# Patient Record
Sex: FEMALE | Race: BLACK OR AFRICAN AMERICAN | Employment: UNEMPLOYED | URBAN - METROPOLITAN AREA
[De-identification: names, ages, dates, MRNs, and addresses within clinical notes are randomized per-mention and may not be internally consistent; named-entity substitution may affect disease eponyms.]

---

## 2017-03-02 PROBLEM — O34.219 PREVIOUS CESAREAN DELIVERY AFFECTING PREGNANCY, ANTEPARTUM: Status: ACTIVE | Noted: 2017-03-02

## 2017-03-14 PROBLEM — E10.10 DKA, TYPE 1, NOT AT GOAL (HCC): Status: ACTIVE | Noted: 2017-03-14

## 2017-03-14 PROBLEM — O16.2 ELEVATED BLOOD PRESSURE AFFECTING PREGNANCY IN SECOND TRIMESTER, ANTEPARTUM: Status: ACTIVE | Noted: 2017-03-14

## 2017-03-14 PROBLEM — R03.0 ELEVATED BLOOD PRESSURE READING WITHOUT DIAGNOSIS OF HYPERTENSION: Status: ACTIVE | Noted: 2017-03-14

## 2017-04-17 PROBLEM — Z3A.19 19 WEEKS GESTATION OF PREGNANCY: Status: ACTIVE | Noted: 2017-04-17

## 2017-06-14 PROBLEM — Z3A.27 27 WEEKS GESTATION OF PREGNANCY: Status: ACTIVE | Noted: 2017-06-14

## 2017-06-14 PROBLEM — Z3A.19 19 WEEKS GESTATION OF PREGNANCY: Status: RESOLVED | Noted: 2017-04-17 | Resolved: 2017-06-14

## 2017-06-20 PROBLEM — Z3A.27 27 WEEKS GESTATION OF PREGNANCY: Status: RESOLVED | Noted: 2017-06-14 | Resolved: 2017-06-20

## 2017-06-20 PROBLEM — R03.0 ELEVATED BLOOD PRESSURE READING WITHOUT DIAGNOSIS OF HYPERTENSION: Status: RESOLVED | Noted: 2017-03-14 | Resolved: 2017-06-20

## 2017-06-20 PROBLEM — O16.2 ELEVATED BLOOD PRESSURE AFFECTING PREGNANCY IN SECOND TRIMESTER, ANTEPARTUM: Status: RESOLVED | Noted: 2017-03-14 | Resolved: 2017-06-20

## 2017-09-08 PROBLEM — N94.6 MENSES PAINFUL: Status: ACTIVE | Noted: 2017-09-08

## 2017-09-08 PROBLEM — N20.0 KIDNEY STONES: Status: ACTIVE | Noted: 2017-09-08

## 2017-09-27 PROBLEM — R10.84 GENERALIZED ABDOMINAL PAIN: Status: ACTIVE | Noted: 2017-09-27

## 2017-09-27 PROBLEM — F11.10 OPIOID ABUSE, EPISODIC (HCC): Status: ACTIVE | Noted: 2017-09-27

## 2017-09-27 PROBLEM — Z72.0 TOBACCO ABUSE: Status: ACTIVE | Noted: 2017-09-27

## 2018-03-10 ENCOUNTER — HOSPITAL ENCOUNTER (EMERGENCY)
Age: 28
Discharge: HOME | End: 2018-03-10
Attending: EMERGENCY MEDICINE
Payer: COMMERCIAL

## 2018-03-10 VITALS
HEIGHT: 64 IN | DIASTOLIC BLOOD PRESSURE: 73 MMHG | SYSTOLIC BLOOD PRESSURE: 103 MMHG | BODY MASS INDEX: 25.61 KG/M2 | HEART RATE: 87 BPM | WEIGHT: 150 LBS | OXYGEN SATURATION: 99 % | RESPIRATION RATE: 14 BRPM | TEMPERATURE: 97.6 F

## 2018-03-10 DIAGNOSIS — E16.2 HYPOGLYCEMIA: ICD-10-CM

## 2018-03-10 DIAGNOSIS — E10.649 TYPE 1 DIABETES MELLITUS WITH HYPOGLYCEMIA AND WITHOUT COMA (HCC): Primary | ICD-10-CM

## 2018-03-10 LAB
ALBUMIN SERPL-MCNC: 4.2 G/DL (ref 3.5–5.2)
ALP BLD-CCNC: 149 U/L (ref 35–104)
ALT SERPL-CCNC: 19 U/L (ref 0–32)
ANION GAP SERPL CALCULATED.3IONS-SCNC: 11 MMOL/L (ref 7–16)
AST SERPL-CCNC: 29 U/L (ref 0–31)
BACTERIA: ABNORMAL /HPF
BETA-HYDROXYBUTYRATE: 0.18 MMOL/L (ref 0.02–0.27)
BILIRUB SERPL-MCNC: <0.2 MG/DL (ref 0–1.2)
BILIRUBIN URINE: NEGATIVE
BLOOD, URINE: ABNORMAL
BUN BLDV-MCNC: 20 MG/DL (ref 6–20)
CALCIUM SERPL-MCNC: 10.2 MG/DL (ref 8.6–10.2)
CHLORIDE BLD-SCNC: 99 MMOL/L (ref 98–107)
CHP ED QC CHECK: NORMAL
CHP ED QC CHECK: YES
CLARITY: ABNORMAL
CO2: 29 MMOL/L (ref 22–29)
COLOR: YELLOW
CREAT SERPL-MCNC: 1.4 MG/DL (ref 0.5–1)
GFR AFRICAN AMERICAN: 55
GFR NON-AFRICAN AMERICAN: 55 ML/MIN/1.73
GLUCOSE BLD-MCNC: 201 MG/DL
GLUCOSE BLD-MCNC: 519 MG/DL
GLUCOSE BLD-MCNC: 537 MG/DL
GLUCOSE BLD-MCNC: 54 MG/DL
GLUCOSE BLD-MCNC: 80 MG/DL (ref 74–109)
GLUCOSE BLD-MCNC: 83 MG/DL
GLUCOSE URINE: >=1000 MG/DL
HCT VFR BLD CALC: 43.6 % (ref 34–48)
HEMOGLOBIN: 14.2 G/DL (ref 11.5–15.5)
KETONES, URINE: NEGATIVE MG/DL
LACTIC ACID: 2.2 MMOL/L (ref 0.5–2.2)
LEUKOCYTE ESTERASE, URINE: ABNORMAL
MCH RBC QN AUTO: 30.3 PG (ref 26–35)
MCHC RBC AUTO-ENTMCNC: 32.6 % (ref 32–34.5)
MCV RBC AUTO: 93 FL (ref 80–99.9)
METER GLUCOSE: 125 MG/DL (ref 70–110)
METER GLUCOSE: 201 MG/DL (ref 70–110)
METER GLUCOSE: 54 MG/DL (ref 70–110)
METER GLUCOSE: >500 MG/DL (ref 70–110)
NITRITE, URINE: NEGATIVE
PDW BLD-RTO: 12 FL (ref 11.5–15)
PH UA: 5 (ref 5–9)
PH VENOUS: 7.28 (ref 7.3–7.42)
PLATELET # BLD: 316 E9/L (ref 130–450)
PMV BLD AUTO: 10.2 FL (ref 7–12)
POC ANION GAP: 13
POC ANION GAP: 14
POC BUN: 20
POC BUN: 28
POC CHLORIDE: 103
POC CHLORIDE: 106
POC CO2: 22
POC CO2: 30
POC CREATININE: 0.8
POC CREATININE: 1.4
POC POTASSIUM: 4
POC POTASSIUM: 4
POC SODIUM: 137
POC SODIUM: 141
POTASSIUM SERPL-SCNC: 4.9 MMOL/L (ref 3.5–5)
PREGNANCY TEST URINE, POC: NEGATIVE
PROTEIN UA: 30 MG/DL
RBC # BLD: 4.69 E12/L (ref 3.5–5.5)
RBC UA: ABNORMAL /HPF (ref 0–2)
SODIUM BLD-SCNC: 139 MMOL/L (ref 132–146)
SPECIFIC GRAVITY UA: 1.01 (ref 1–1.03)
TOTAL PROTEIN: 8.6 G/DL (ref 6.4–8.3)
UROBILINOGEN, URINE: 0.2 E.U./DL
WBC # BLD: 8.5 E9/L (ref 4.5–11.5)
WBC UA: ABNORMAL /HPF (ref 0–5)
YEAST: ABNORMAL

## 2018-03-10 PROCEDURE — 96375 TX/PRO/DX INJ NEW DRUG ADDON: CPT

## 2018-03-10 PROCEDURE — 82800 BLOOD PH: CPT

## 2018-03-10 PROCEDURE — 81015 MICROSCOPIC EXAM OF URINE: CPT

## 2018-03-10 PROCEDURE — 80053 COMPREHEN METABOLIC PANEL: CPT

## 2018-03-10 PROCEDURE — 81003 URINALYSIS AUTO W/O SCOPE: CPT

## 2018-03-10 PROCEDURE — 36000 PLACE NEEDLE IN VEIN: CPT

## 2018-03-10 PROCEDURE — 36415 COLL VENOUS BLD VENIPUNCTURE: CPT

## 2018-03-10 PROCEDURE — 6370000000 HC RX 637 (ALT 250 FOR IP): Performed by: EMERGENCY MEDICINE

## 2018-03-10 PROCEDURE — 82962 GLUCOSE BLOOD TEST: CPT

## 2018-03-10 PROCEDURE — 82010 KETONE BODYS QUAN: CPT

## 2018-03-10 PROCEDURE — 83605 ASSAY OF LACTIC ACID: CPT

## 2018-03-10 PROCEDURE — 85027 COMPLETE CBC AUTOMATED: CPT

## 2018-03-10 PROCEDURE — 96374 THER/PROPH/DIAG INJ IV PUSH: CPT

## 2018-03-10 PROCEDURE — 6360000002 HC RX W HCPCS: Performed by: EMERGENCY MEDICINE

## 2018-03-10 PROCEDURE — 2580000003 HC RX 258: Performed by: EMERGENCY MEDICINE

## 2018-03-10 PROCEDURE — 6370000000 HC RX 637 (ALT 250 FOR IP)

## 2018-03-10 PROCEDURE — 99285 EMERGENCY DEPT VISIT HI MDM: CPT

## 2018-03-10 RX ORDER — 0.9 % SODIUM CHLORIDE 0.9 %
1000 INTRAVENOUS SOLUTION INTRAVENOUS ONCE
Status: COMPLETED | OUTPATIENT
Start: 2018-03-10 | End: 2018-03-10

## 2018-03-10 RX ORDER — SODIUM CHLORIDE, SODIUM LACTATE, POTASSIUM CHLORIDE, CALCIUM CHLORIDE 600; 310; 30; 20 MG/100ML; MG/100ML; MG/100ML; MG/100ML
INJECTION, SOLUTION INTRAVENOUS ONCE
Status: DISCONTINUED | OUTPATIENT
Start: 2018-03-10 | End: 2018-03-10 | Stop reason: HOSPADM

## 2018-03-10 RX ORDER — ONDANSETRON 2 MG/ML
4 INJECTION INTRAMUSCULAR; INTRAVENOUS ONCE
Status: DISCONTINUED | OUTPATIENT
Start: 2018-03-10 | End: 2018-03-10 | Stop reason: HOSPADM

## 2018-03-10 RX ORDER — SODIUM CHLORIDE, SODIUM LACTATE, POTASSIUM CHLORIDE, CALCIUM CHLORIDE 600; 310; 30; 20 MG/100ML; MG/100ML; MG/100ML; MG/100ML
INJECTION, SOLUTION INTRAVENOUS ONCE
Status: DISCONTINUED | OUTPATIENT
Start: 2018-03-10 | End: 2018-03-10

## 2018-03-10 RX ORDER — ONDANSETRON 2 MG/ML
INJECTION INTRAMUSCULAR; INTRAVENOUS
Status: DISCONTINUED
Start: 2018-03-10 | End: 2018-03-10 | Stop reason: WASHOUT

## 2018-03-10 RX ORDER — ONDANSETRON 4 MG/1
4 TABLET, ORALLY DISINTEGRATING ORAL ONCE
Status: COMPLETED | OUTPATIENT
Start: 2018-03-10 | End: 2018-03-10

## 2018-03-10 RX ORDER — CEPHALEXIN 250 MG/1
500 CAPSULE ORAL ONCE
Status: COMPLETED | OUTPATIENT
Start: 2018-03-10 | End: 2018-03-10

## 2018-03-10 RX ORDER — ONDANSETRON 4 MG/1
TABLET, ORALLY DISINTEGRATING ORAL
Status: DISCONTINUED
Start: 2018-03-10 | End: 2018-03-10 | Stop reason: HOSPADM

## 2018-03-10 RX ADMIN — INSULIN HUMAN 10 UNITS: 100 INJECTION, SOLUTION PARENTERAL at 17:38

## 2018-03-10 RX ADMIN — INSULIN HUMAN 10 UNITS: 100 INJECTION, SOLUTION PARENTERAL at 16:19

## 2018-03-10 RX ADMIN — SODIUM CHLORIDE 1000 ML: 9 INJECTION, SOLUTION INTRAVENOUS at 17:27

## 2018-03-10 RX ADMIN — ONDANSETRON 4 MG: 4 TABLET, ORALLY DISINTEGRATING ORAL at 12:52

## 2018-03-10 RX ADMIN — CEPHALEXIN 500 MG: 250 CAPSULE ORAL at 17:33

## 2018-03-10 ASSESSMENT — ENCOUNTER SYMPTOMS
CONSTIPATION: 0
EYE PAIN: 0
CHEST TIGHTNESS: 0
EYE REDNESS: 0
ABDOMINAL PAIN: 0
VOMITING: 0
DIARRHEA: 0
SHORTNESS OF BREATH: 0
NAUSEA: 0

## 2018-03-10 ASSESSMENT — PAIN DESCRIPTION - LOCATION: LOCATION: HAND

## 2018-03-10 ASSESSMENT — PAIN DESCRIPTION - PAIN TYPE: TYPE: ACUTE PAIN

## 2018-03-10 ASSESSMENT — PAIN DESCRIPTION - DESCRIPTORS: DESCRIPTORS: ACHING

## 2018-03-10 ASSESSMENT — PAIN DESCRIPTION - ONSET: ONSET: ON-GOING

## 2018-03-10 ASSESSMENT — PAIN SCALES - GENERAL: PAINLEVEL_OUTOF10: 7

## 2018-03-10 ASSESSMENT — PAIN DESCRIPTION - FREQUENCY: FREQUENCY: CONTINUOUS

## 2018-03-10 NOTE — ED PROVIDER NOTES
preformed under live ultrasound for guidance. The patient tolerated the procedure well. Complications: None      ED Course        --------------------------------------------- PAST HISTORY ---------------------------------------------  Past Medical History:  has a past medical history of Chronic kidney disease; Depression; Diabetes mellitus ([facility] [address] Diabetic ketoacidosis (HCC); MRSA (methicillin resistant Staphylococcus aureus); Non compliance w medication regimen; Other disorders of kidney and ureter; Pregnancy; and Severe pre-eclampsia in third trimester. Past Surgical History:  has a past surgical history that includes back surgery; ECHO Compl W Dop Color Flow (2012); ECHO Compl W Dop Color Flow (6/10/2013);  section; and Cholecystectomy. Social History:  reports that she has been smoking Cigarettes. She has a 0.75 pack-year smoking history. She has never used smokeless tobacco. She reports that she does not drink alcohol or use drugs. Family History: family history includes Asthma in her brother and mother; Hypertension in her mother. The patients home medications have been reviewed. Allergies: Patient has no known allergies.     -------------------------------------------------- RESULTS -------------------------------------------------  Labs:  Results for orders placed or performed during the hospital encounter of 03/10/18   CBC   Result Value Ref Range    WBC 8.5 4.5 - 11.5 E9/L    RBC 4.69 3.50 - 5.50 E12/L    Hemoglobin 14.2 11.5 - 15.5 g/dL    Hematocrit 43.6 34.0 - 48.0 %    MCV 93.0 80.0 - 99.9 fL    MCH 30.3 26.0 - 35.0 pg    MCHC 32.6 32.0 - 34.5 %    RDW 12.0 11.5 - 15.0 fL    Platelets 216 484 - 464 E9/L    MPV 10.2 7.0 - 12.0 fL   Comprehensive Metabolic Panel   Result Value Ref Range    Sodium 139 132 - 146 mmol/L    Potassium 4.9 3.5 - 5.0 mmol/L    Chloride 99 98 - 107 mmol/L    CO2 29 22 - 29 mmol/L    Anion Gap 11 7 - 16 mmol/L    Glucose 80 74 - 109 mg/dL coma ([facility]    2. Hypoglycemia        Disposition:  Patient's disposition: Discharge to home  Patient's condition is stable.            Maryann Garza DO  03/10/18 190

## 2018-03-10 NOTE — ED NOTES
Pt medicated per order. Pt aware of need for urine specimen. Pt unable to obtain at this time. NAD noted. Will continue to monitor.       Alina Estrada RN  03/10/18 3732

## 2018-03-11 NOTE — ED NOTES
Patient provided with hospital pants and meal tray. Patient called for ride.      Sandhya Myrick RN  03/10/18 0891

## 2018-03-18 ENCOUNTER — HOSPITAL ENCOUNTER (INPATIENT)
Age: 28
LOS: 2 days | Discharge: HOME | DRG: 420 | End: 2018-03-20
Attending: EMERGENCY MEDICINE | Admitting: INTERNAL MEDICINE
Payer: COMMERCIAL

## 2018-03-18 ENCOUNTER — APPOINTMENT (OUTPATIENT)
Dept: GENERAL RADIOLOGY | Age: 28
DRG: 420 | End: 2018-03-18
Payer: COMMERCIAL

## 2018-03-18 DIAGNOSIS — E10.10 DIABETIC KETOACIDOSIS WITHOUT COMA ASSOCIATED WITH TYPE 1 DIABETES MELLITUS (HCC): Primary | ICD-10-CM

## 2018-03-18 PROBLEM — E86.0 DEHYDRATION: Status: ACTIVE | Noted: 2018-03-18

## 2018-03-18 PROBLEM — E10.65 HYPERGLYCEMIA DUE TO TYPE 1 DIABETES MELLITUS (HCC): Status: ACTIVE | Noted: 2018-03-18

## 2018-03-18 LAB
ALBUMIN SERPL-MCNC: 3.5 G/DL (ref 3.5–5.2)
ALP BLD-CCNC: 138 U/L (ref 35–104)
ALT SERPL-CCNC: 11 U/L (ref 0–32)
ANION GAP SERPL CALCULATED.3IONS-SCNC: 15 MMOL/L (ref 7–16)
AST SERPL-CCNC: 11 U/L (ref 0–31)
BACTERIA: ABNORMAL /HPF
BASOPHILS ABSOLUTE: 0.09 E9/L (ref 0–0.2)
BASOPHILS RELATIVE PERCENT: 1.3 % (ref 0–2)
BETA-HYDROXYBUTYRATE: 3.24 MMOL/L (ref 0.02–0.27)
BILIRUB SERPL-MCNC: 0.3 MG/DL (ref 0–1.2)
BILIRUBIN URINE: NEGATIVE
BLOOD, URINE: ABNORMAL
BUN BLDV-MCNC: 21 MG/DL (ref 6–20)
CALCIUM SERPL-MCNC: 9 MG/DL (ref 8.6–10.2)
CHLORIDE BLD-SCNC: 99 MMOL/L (ref 98–107)
CHP ED QC CHECK: NORMAL
CHP ED QC CHECK: YES
CLARITY: CLEAR
CO2: 25 MMOL/L (ref 22–29)
COLOR: ABNORMAL
CREAT SERPL-MCNC: 1 MG/DL (ref 0.5–1)
EOSINOPHILS ABSOLUTE: 0.12 E9/L (ref 0.05–0.5)
EOSINOPHILS RELATIVE PERCENT: 1.7 % (ref 0–6)
GFR AFRICAN AMERICAN: >60
GFR NON-AFRICAN AMERICAN: >60 ML/MIN/1.73
GLUCOSE BLD-MCNC: 281 MG/DL
GLUCOSE BLD-MCNC: 488 MG/DL
GLUCOSE BLD-MCNC: 715 MG/DL (ref 74–109)
GLUCOSE BLD-MCNC: NORMAL MG/DL
GLUCOSE URINE: >=1000 MG/DL
HCT VFR BLD CALC: 35.9 % (ref 34–48)
HEMOGLOBIN: 11.8 G/DL (ref 11.5–15.5)
IMMATURE GRANULOCYTES #: 0.02 E9/L
IMMATURE GRANULOCYTES %: 0.3 % (ref 0–5)
KETONES, URINE: 40 MG/DL
LACTIC ACID: 1 MMOL/L (ref 0.5–2.2)
LEUKOCYTE ESTERASE, URINE: NEGATIVE
LYMPHOCYTES ABSOLUTE: 1.86 E9/L (ref 1.5–4)
LYMPHOCYTES RELATIVE PERCENT: 26.8 % (ref 20–42)
MAGNESIUM: 2.1 MG/DL (ref 1.6–2.6)
MCH RBC QN AUTO: 30.7 PG (ref 26–35)
MCHC RBC AUTO-ENTMCNC: 32.9 % (ref 32–34.5)
MCV RBC AUTO: 93.5 FL (ref 80–99.9)
METER GLUCOSE: 281 MG/DL (ref 70–110)
METER GLUCOSE: >500 MG/DL (ref 70–110)
METER GLUCOSE: >500 MG/DL (ref 70–110)
MONOCYTES ABSOLUTE: 0.25 E9/L (ref 0.1–0.95)
MONOCYTES RELATIVE PERCENT: 3.6 % (ref 2–12)
NEUTROPHILS ABSOLUTE: 4.61 E9/L (ref 1.8–7.3)
NEUTROPHILS RELATIVE PERCENT: 66.3 % (ref 43–80)
NITRITE, URINE: NEGATIVE
PDW BLD-RTO: 12.3 FL (ref 11.5–15)
PH UA: 5.5 (ref 5–9)
PH VENOUS: 7.39 (ref 7.3–7.42)
PHOSPHORUS: 4.3 MG/DL (ref 2.5–4.5)
PLATELET # BLD: 274 E9/L (ref 130–450)
PMV BLD AUTO: 10.6 FL (ref 7–12)
POC ANION GAP: 15
POC ANION GAP: 15
POC BUN: 21
POC BUN: 27
POC CHLORIDE: 101
POC CHLORIDE: 104
POC CO2: 25
POC CO2: 25
POC CREATININE: 1
POC CREATININE: NORMAL
POC POTASSIUM: NORMAL
POC POTASSIUM: NORMAL
POC SODIUM: 135
POC SODIUM: 140
POTASSIUM SERPL-SCNC: 5.1 MMOL/L (ref 3.5–5)
PREGNANCY TEST URINE, POC: NEGATIVE
PROTEIN UA: NEGATIVE MG/DL
RBC # BLD: 3.84 E12/L (ref 3.5–5.5)
RBC UA: ABNORMAL /HPF (ref 0–2)
SODIUM BLD-SCNC: 139 MMOL/L (ref 132–146)
SPECIFIC GRAVITY UA: <=1.005 (ref 1–1.03)
TOTAL PROTEIN: 6.9 G/DL (ref 6.4–8.3)
UROBILINOGEN, URINE: 0.2 E.U./DL
WBC # BLD: 7 E9/L (ref 4.5–11.5)
WBC UA: ABNORMAL /HPF (ref 0–5)

## 2018-03-18 PROCEDURE — 94761 N-INVAS EAR/PLS OXIMETRY MLT: CPT

## 2018-03-18 PROCEDURE — 99285 EMERGENCY DEPT VISIT HI MDM: CPT

## 2018-03-18 PROCEDURE — 2000000000 HC ICU R&B

## 2018-03-18 PROCEDURE — 82962 GLUCOSE BLOOD TEST: CPT

## 2018-03-18 PROCEDURE — 83735 ASSAY OF MAGNESIUM: CPT

## 2018-03-18 PROCEDURE — 83605 ASSAY OF LACTIC ACID: CPT

## 2018-03-18 PROCEDURE — 6360000002 HC RX W HCPCS: Performed by: EMERGENCY MEDICINE

## 2018-03-18 PROCEDURE — 82800 BLOOD PH: CPT

## 2018-03-18 PROCEDURE — 71045 X-RAY EXAM CHEST 1 VIEW: CPT

## 2018-03-18 PROCEDURE — 93005 ELECTROCARDIOGRAM TRACING: CPT | Performed by: NURSE PRACTITIONER

## 2018-03-18 PROCEDURE — 83930 ASSAY OF BLOOD OSMOLALITY: CPT

## 2018-03-18 PROCEDURE — 80053 COMPREHEN METABOLIC PANEL: CPT

## 2018-03-18 PROCEDURE — 85025 COMPLETE CBC W/AUTO DIFF WBC: CPT

## 2018-03-18 PROCEDURE — 96365 THER/PROPH/DIAG IV INF INIT: CPT

## 2018-03-18 PROCEDURE — 96366 THER/PROPH/DIAG IV INF ADDON: CPT

## 2018-03-18 PROCEDURE — 84100 ASSAY OF PHOSPHORUS: CPT

## 2018-03-18 PROCEDURE — 2580000003 HC RX 258: Performed by: EMERGENCY MEDICINE

## 2018-03-18 PROCEDURE — 82010 KETONE BODYS QUAN: CPT

## 2018-03-18 PROCEDURE — 81001 URINALYSIS AUTO W/SCOPE: CPT

## 2018-03-18 PROCEDURE — 6370000000 HC RX 637 (ALT 250 FOR IP): Performed by: EMERGENCY MEDICINE

## 2018-03-18 PROCEDURE — 96375 TX/PRO/DX INJ NEW DRUG ADDON: CPT

## 2018-03-18 PROCEDURE — 36415 COLL VENOUS BLD VENIPUNCTURE: CPT

## 2018-03-18 RX ORDER — NICOTINE POLACRILEX 4 MG
15 LOZENGE BUCCAL PRN
Status: DISCONTINUED | OUTPATIENT
Start: 2018-03-18 | End: 2018-03-20 | Stop reason: HOSPADM

## 2018-03-18 RX ORDER — DEXTROSE MONOHYDRATE 25 G/50ML
12.5 INJECTION, SOLUTION INTRAVENOUS PRN
Status: DISCONTINUED | OUTPATIENT
Start: 2018-03-18 | End: 2018-03-20 | Stop reason: HOSPADM

## 2018-03-18 RX ORDER — ONDANSETRON 2 MG/ML
4 INJECTION INTRAMUSCULAR; INTRAVENOUS ONCE
Status: COMPLETED | OUTPATIENT
Start: 2018-03-18 | End: 2018-03-18

## 2018-03-18 RX ORDER — SODIUM CHLORIDE, SODIUM LACTATE, POTASSIUM CHLORIDE, AND CALCIUM CHLORIDE .6; .31; .03; .02 G/100ML; G/100ML; G/100ML; G/100ML
1000 INJECTION, SOLUTION INTRAVENOUS ONCE
Status: COMPLETED | OUTPATIENT
Start: 2018-03-18 | End: 2018-03-18

## 2018-03-18 RX ORDER — DEXTROSE MONOHYDRATE 50 MG/ML
100 INJECTION, SOLUTION INTRAVENOUS PRN
Status: DISCONTINUED | OUTPATIENT
Start: 2018-03-18 | End: 2018-03-20 | Stop reason: HOSPADM

## 2018-03-18 RX ADMIN — ONDANSETRON 4 MG: 2 INJECTION INTRAMUSCULAR; INTRAVENOUS at 18:37

## 2018-03-18 RX ADMIN — SODIUM CHLORIDE 0.03 UNITS/KG/HR: 9 INJECTION, SOLUTION INTRAVENOUS at 22:57

## 2018-03-18 RX ADMIN — SODIUM CHLORIDE 0.1 UNITS/KG/HR: 9 INJECTION, SOLUTION INTRAVENOUS at 20:01

## 2018-03-18 RX ADMIN — SODIUM CHLORIDE, POTASSIUM CHLORIDE, SODIUM LACTATE AND CALCIUM CHLORIDE 1000 ML: 600; 310; 30; 20 INJECTION, SOLUTION INTRAVENOUS at 18:35

## 2018-03-18 RX ADMIN — SODIUM CHLORIDE, POTASSIUM CHLORIDE, SODIUM LACTATE AND CALCIUM CHLORIDE 1000 ML: 600; 310; 30; 20 INJECTION, SOLUTION INTRAVENOUS at 18:33

## 2018-03-18 ASSESSMENT — PAIN DESCRIPTION - DESCRIPTORS: DESCRIPTORS: ACHING

## 2018-03-18 ASSESSMENT — ENCOUNTER SYMPTOMS
VOMITING: 0
BLURRED VISION: 0
DIARRHEA: 0
SORE THROAT: 0
SHORTNESS OF BREATH: 0
BLOOD IN STOOL: 0
COUGH: 0
SINUS PRESSURE: 0
CONSTIPATION: 0
ABDOMINAL PAIN: 1
COLOR CHANGE: 0
SINUS PAIN: 0

## 2018-03-18 ASSESSMENT — PAIN DESCRIPTION - LOCATION: LOCATION: ABDOMEN

## 2018-03-18 ASSESSMENT — PAIN SCALES - GENERAL: PAINLEVEL_OUTOF10: 8

## 2018-03-18 ASSESSMENT — PAIN DESCRIPTION - PAIN TYPE: TYPE: ACUTE PAIN

## 2018-03-18 ASSESSMENT — PAIN DESCRIPTION - FREQUENCY: FREQUENCY: CONTINUOUS

## 2018-03-18 NOTE — ED PROVIDER NOTES
Patient is a 23 yo female with hx of poorly controlled diabetes that presents to the ED via private vehicle for hyperglycemia. She reports that she has been having worsening shortness of breath with standing, crampy abdominal pain and nausea since last night. She took blood glucose at home this morning and read high, prompting visit to the ED. She states that she has been on a sliding insulin scale at home with her last dose of 10 units prior to arrival. She admits to similar prior episodes with being hospitalized for DKA multiple times. The history is provided by the patient. Hyperglycemia   Blood sugar level PTA:  >500  Onset quality:  Gradual  Duration:  1 day  Progression:  Worsening  Chronicity:  Recurrent  Diabetes status:  Controlled with insulin  Context: not change in medication and not new diabetes diagnosis    Relieved by:  None tried  Ineffective treatments:  None tried  Associated symptoms: abdominal pain, fatigue and nausea    Associated symptoms: no altered mental status, no blurred vision, no chest pain, no dizziness, no dysuria, no fever, no shortness of breath, no vomiting and no weakness    Risk factors: hx of DKA    Risk factors: no pregnancy        Review of Systems   Constitutional: Positive for fatigue. Negative for chills and fever. HENT: Negative for congestion, sinus pain, sinus pressure and sore throat. Eyes: Negative for blurred vision and visual disturbance. Respiratory: Negative for cough and shortness of breath. Cardiovascular: Positive for palpitations. Negative for chest pain. Gastrointestinal: Positive for abdominal pain and nausea. Negative for blood in stool, constipation, diarrhea and vomiting. Genitourinary: Negative for decreased urine volume, difficulty urinating, dysuria and flank pain. Musculoskeletal: Negative for neck pain and neck stiffness. Skin: Negative for color change.    Neurological: Negative for dizziness, syncope, weakness, Sodium 135     POC CO2 25     QC OK? YES    POCT Glucose   Result Value Ref Range    Glucose 281 mg/dL   POCT Glucose   Result Value Ref Range    Meter Glucose >500 (H) 70 - 110 mg/dL   POCT chem basic w/ ICA   Result Value Ref Range    POC BUN 21     POC Chloride 104     POC Creatinine      POC Anion Gap 15     POC Glucose 488     POC Potassium      POC Sodium 140     POC CO2 25    POCT Glucose   Result Value Ref Range    Meter Glucose 281 (H) 70 - 110 mg/dL   POCT Glucose   Result Value Ref Range    Meter Glucose 269 (H) 70 - 110 mg/dL   EKG 12 Lead   Result Value Ref Range    Ventricular Rate 85 BPM    Atrial Rate 85 BPM    P-R Interval 108 ms    QRS Duration 80 ms    Q-T Interval 350 ms    QTc Calculation (Bazett) 416 ms    P Axis 42 degrees    R Axis 55 degrees    T Axis 41 degrees       Radiology  XR CHEST PORTABLE   Final Result      Normal portable frontal chest.          ------------------------- NURSING NOTES AND VITALS REVIEWED ---------------------------  Date / Time Roomed:  3/18/2018  4:49 PM  ED Bed Assignment:  [id number]    The nursing notes within the ED encounter and vital signs as below have been reviewed. Patient Vitals for the past 24 hrs:   BP Temp Temp src Pulse Resp SpO2 Height Weight   03/18/18 2300 114/78 - - 97 10 99 % - -   03/18/18 2041 136/88 - - 80 14 98 % - -   03/18/18 2000 136/83 - - 80 10 97 % - -   03/18/18 1851 134/88 98.3 °F (36.8 °C) Oral 84 18 98 % - -   03/18/18 1644 110/78 98.3 °F (36.8 °C) - 138 20 99 % 5' 4\" (1.626 m) 141 lb (64 kg)       Oxygen Saturation Interpretation: Normal    ------------------------------------------ PROGRESS NOTES ------------------------------------------  Re-evaluation(s):  903: Patient is resting in bed comfortably, in NAD. Receiving DKA protocol now. 915: Patient is alert and oriented. Patient denies any abdominal pain at this time on reassessment.      Consultations:  Spoke with Annabella Whitt,  They will admit this

## 2018-03-19 LAB
ANION GAP SERPL CALCULATED.3IONS-SCNC: 10 MMOL/L (ref 7–16)
ANION GAP SERPL CALCULATED.3IONS-SCNC: 11 MMOL/L (ref 7–16)
ANION GAP SERPL CALCULATED.3IONS-SCNC: 8 MMOL/L (ref 7–16)
BASOPHILS ABSOLUTE: 0.12 E9/L (ref 0–0.2)
BASOPHILS RELATIVE PERCENT: 1.4 % (ref 0–2)
BUN BLDV-MCNC: 14 MG/DL (ref 6–20)
BUN BLDV-MCNC: 15 MG/DL (ref 6–20)
BUN BLDV-MCNC: 16 MG/DL (ref 6–20)
CALCIUM SERPL-MCNC: 6 MG/DL (ref 8.6–10.2)
CALCIUM SERPL-MCNC: 8.4 MG/DL (ref 8.6–10.2)
CALCIUM SERPL-MCNC: 8.8 MG/DL (ref 8.6–10.2)
CHLORIDE BLD-SCNC: 103 MMOL/L (ref 98–107)
CHLORIDE BLD-SCNC: 111 MMOL/L (ref 98–107)
CHLORIDE BLD-SCNC: 96 MMOL/L (ref 98–107)
CO2: 20 MMOL/L (ref 22–29)
CO2: 28 MMOL/L (ref 22–29)
CO2: 30 MMOL/L (ref 22–29)
CREAT SERPL-MCNC: 0.5 MG/DL (ref 0.5–1)
CREAT SERPL-MCNC: 0.8 MG/DL (ref 0.5–1)
CREAT SERPL-MCNC: 0.8 MG/DL (ref 0.5–1)
EOSINOPHILS ABSOLUTE: 0.2 E9/L (ref 0.05–0.5)
EOSINOPHILS RELATIVE PERCENT: 2.4 % (ref 0–6)
GFR AFRICAN AMERICAN: >60
GFR NON-AFRICAN AMERICAN: >60 ML/MIN/1.73
GLUCOSE BLD-MCNC: 145 MG/DL (ref 74–109)
GLUCOSE BLD-MCNC: 252 MG/DL (ref 74–109)
GLUCOSE BLD-MCNC: 378 MG/DL (ref 74–109)
HBA1C MFR BLD: 13 % (ref 4.8–5.9)
HCT VFR BLD CALC: 32.1 % (ref 34–48)
HEMOGLOBIN: 10.7 G/DL (ref 11.5–15.5)
IMMATURE GRANULOCYTES #: 0.01 E9/L
IMMATURE GRANULOCYTES %: 0.1 % (ref 0–5)
LYMPHOCYTES ABSOLUTE: 3.74 E9/L (ref 1.5–4)
LYMPHOCYTES RELATIVE PERCENT: 45 % (ref 20–42)
MAGNESIUM: 1.6 MG/DL (ref 1.6–2.6)
MAGNESIUM: 2.1 MG/DL (ref 1.6–2.6)
MCH RBC QN AUTO: 30.8 PG (ref 26–35)
MCHC RBC AUTO-ENTMCNC: 33.3 % (ref 32–34.5)
MCV RBC AUTO: 92.5 FL (ref 80–99.9)
METER GLUCOSE: 104 MG/DL (ref 70–110)
METER GLUCOSE: 109 MG/DL (ref 70–110)
METER GLUCOSE: 116 MG/DL (ref 70–110)
METER GLUCOSE: 118 MG/DL (ref 70–110)
METER GLUCOSE: 126 MG/DL (ref 70–110)
METER GLUCOSE: 131 MG/DL (ref 70–110)
METER GLUCOSE: 143 MG/DL (ref 70–110)
METER GLUCOSE: 186 MG/DL (ref 70–110)
METER GLUCOSE: 197 MG/DL (ref 70–110)
METER GLUCOSE: 269 MG/DL (ref 70–110)
METER GLUCOSE: 283 MG/DL (ref 70–110)
METER GLUCOSE: 302 MG/DL (ref 70–110)
METER GLUCOSE: 99 MG/DL (ref 70–110)
MONOCYTES ABSOLUTE: 0.45 E9/L (ref 0.1–0.95)
MONOCYTES RELATIVE PERCENT: 5.4 % (ref 2–12)
NEUTROPHILS ABSOLUTE: 3.8 E9/L (ref 1.8–7.3)
NEUTROPHILS RELATIVE PERCENT: 45.7 % (ref 43–80)
OSMOLALITY: 328 MOSM/KG (ref 285–310)
PDW BLD-RTO: 12.3 FL (ref 11.5–15)
PHOSPHORUS: 2.9 MG/DL (ref 2.5–4.5)
PHOSPHORUS: 3.6 MG/DL (ref 2.5–4.5)
PLATELET # BLD: 244 E9/L (ref 130–450)
PMV BLD AUTO: 11 FL (ref 7–12)
POTASSIUM REFLEX MAGNESIUM: 4.1 MMOL/L (ref 3.5–5)
POTASSIUM SERPL-SCNC: 3.9 MMOL/L (ref 3.5–5)
POTASSIUM SERPL-SCNC: 4 MMOL/L (ref 3.5–5)
RBC # BLD: 3.47 E12/L (ref 3.5–5.5)
SODIUM BLD-SCNC: 135 MMOL/L (ref 132–146)
SODIUM BLD-SCNC: 141 MMOL/L (ref 132–146)
SODIUM BLD-SCNC: 141 MMOL/L (ref 132–146)
WBC # BLD: 8.3 E9/L (ref 4.5–11.5)

## 2018-03-19 PROCEDURE — 2000000000 HC ICU R&B

## 2018-03-19 PROCEDURE — 99406 BEHAV CHNG SMOKING 3-10 MIN: CPT

## 2018-03-19 PROCEDURE — 2580000003 HC RX 258: Performed by: NURSE PRACTITIONER

## 2018-03-19 PROCEDURE — 83735 ASSAY OF MAGNESIUM: CPT

## 2018-03-19 PROCEDURE — 36415 COLL VENOUS BLD VENIPUNCTURE: CPT

## 2018-03-19 PROCEDURE — 80048 BASIC METABOLIC PNL TOTAL CA: CPT

## 2018-03-19 PROCEDURE — 6370000000 HC RX 637 (ALT 250 FOR IP): Performed by: INTERNAL MEDICINE

## 2018-03-19 PROCEDURE — 87081 CULTURE SCREEN ONLY: CPT

## 2018-03-19 PROCEDURE — 84100 ASSAY OF PHOSPHORUS: CPT

## 2018-03-19 PROCEDURE — 82962 GLUCOSE BLOOD TEST: CPT

## 2018-03-19 PROCEDURE — 83036 HEMOGLOBIN GLYCOSYLATED A1C: CPT

## 2018-03-19 PROCEDURE — 85025 COMPLETE CBC W/AUTO DIFF WBC: CPT

## 2018-03-19 PROCEDURE — 6360000002 HC RX W HCPCS: Performed by: INTERNAL MEDICINE

## 2018-03-19 PROCEDURE — 6360000002 HC RX W HCPCS: Performed by: PHYSICIAN ASSISTANT

## 2018-03-19 PROCEDURE — 36415 COLL VENOUS BLD VENIPUNCTURE: CPT | Performed by: NURSE PRACTITIONER

## 2018-03-19 PROCEDURE — 2500000003 HC RX 250 WO HCPCS: Performed by: NURSE PRACTITIONER

## 2018-03-19 RX ORDER — DICYCLOMINE HYDROCHLORIDE 10 MG/ML
20 INJECTION INTRAMUSCULAR ONCE
Status: COMPLETED | OUTPATIENT
Start: 2018-03-19 | End: 2018-03-19

## 2018-03-19 RX ORDER — ONDANSETRON 2 MG/ML
4 INJECTION INTRAMUSCULAR; INTRAVENOUS EVERY 6 HOURS PRN
Status: DISCONTINUED | OUTPATIENT
Start: 2018-03-19 | End: 2018-03-20 | Stop reason: HOSPADM

## 2018-03-19 RX ORDER — MAGNESIUM SULFATE 1 G/100ML
1 INJECTION INTRAVENOUS PRN
Status: DISCONTINUED | OUTPATIENT
Start: 2018-03-19 | End: 2018-03-20 | Stop reason: HOSPADM

## 2018-03-19 RX ORDER — DEXTROSE MONOHYDRATE 25 G/50ML
12.5 INJECTION, SOLUTION INTRAVENOUS PRN
Status: DISCONTINUED | OUTPATIENT
Start: 2018-03-19 | End: 2018-03-19 | Stop reason: SDUPTHER

## 2018-03-19 RX ORDER — 0.9 % SODIUM CHLORIDE 0.9 %
15 INTRAVENOUS SOLUTION INTRAVENOUS ONCE
Status: DISCONTINUED | OUTPATIENT
Start: 2018-03-19 | End: 2018-03-20 | Stop reason: HOSPADM

## 2018-03-19 RX ORDER — IBUPROFEN 800 MG/1
800 TABLET ORAL 3 TIMES DAILY PRN
Status: DISCONTINUED | OUTPATIENT
Start: 2018-03-19 | End: 2018-03-20 | Stop reason: HOSPADM

## 2018-03-19 RX ORDER — IBUPROFEN 800 MG/1
400 TABLET ORAL EVERY 6 HOURS PRN
Status: DISCONTINUED | OUTPATIENT
Start: 2018-03-19 | End: 2018-03-19

## 2018-03-19 RX ORDER — DICYCLOMINE HYDROCHLORIDE 10 MG/ML
20 INJECTION INTRAMUSCULAR 4 TIMES DAILY
Status: DISCONTINUED | OUTPATIENT
Start: 2018-03-19 | End: 2018-03-19

## 2018-03-19 RX ORDER — DEXTROSE AND SODIUM CHLORIDE 5; .45 G/100ML; G/100ML
INJECTION, SOLUTION INTRAVENOUS CONTINUOUS PRN
Status: DISCONTINUED | OUTPATIENT
Start: 2018-03-19 | End: 2018-03-20 | Stop reason: HOSPADM

## 2018-03-19 RX ORDER — INSULIN GLARGINE 100 [IU]/ML
26 INJECTION, SOLUTION SUBCUTANEOUS NIGHTLY
Status: DISCONTINUED | OUTPATIENT
Start: 2018-03-19 | End: 2018-03-20 | Stop reason: HOSPADM

## 2018-03-19 RX ORDER — SODIUM CHLORIDE 9 MG/ML
INJECTION, SOLUTION INTRAVENOUS CONTINUOUS
Status: DISCONTINUED | OUTPATIENT
Start: 2018-03-19 | End: 2018-03-20 | Stop reason: HOSPADM

## 2018-03-19 RX ORDER — DEXTROSE MONOHYDRATE 50 MG/ML
100 INJECTION, SOLUTION INTRAVENOUS PRN
Status: DISCONTINUED | OUTPATIENT
Start: 2018-03-19 | End: 2018-03-19 | Stop reason: SDUPTHER

## 2018-03-19 RX ORDER — NICOTINE POLACRILEX 4 MG
15 LOZENGE BUCCAL PRN
Status: DISCONTINUED | OUTPATIENT
Start: 2018-03-19 | End: 2018-03-19 | Stop reason: SDUPTHER

## 2018-03-19 RX ORDER — POTASSIUM CHLORIDE 7.45 MG/ML
10 INJECTION INTRAVENOUS PRN
Status: DISCONTINUED | OUTPATIENT
Start: 2018-03-19 | End: 2018-03-20 | Stop reason: HOSPADM

## 2018-03-19 RX ADMIN — ONDANSETRON 4 MG: 2 INJECTION INTRAMUSCULAR; INTRAVENOUS at 09:36

## 2018-03-19 RX ADMIN — SODIUM CHLORIDE: 9 INJECTION, SOLUTION INTRAVENOUS at 00:24

## 2018-03-19 RX ADMIN — INSULIN LISPRO 3 UNITS: 100 INJECTION, SOLUTION INTRAVENOUS; SUBCUTANEOUS at 22:44

## 2018-03-19 RX ADMIN — INSULIN LISPRO 8 UNITS: 100 INJECTION, SOLUTION INTRAVENOUS; SUBCUTANEOUS at 12:49

## 2018-03-19 RX ADMIN — SODIUM PHOSPHATE, MONOBASIC, MONOHYDRATE 20 MMOL: 276; 142 INJECTION, SOLUTION INTRAVENOUS at 05:27

## 2018-03-19 RX ADMIN — INSULIN GLARGINE 26 UNITS: 100 INJECTION, SOLUTION SUBCUTANEOUS at 22:43

## 2018-03-19 RX ADMIN — IBUPROFEN 400 MG: 800 TABLET, FILM COATED ORAL at 17:38

## 2018-03-19 RX ADMIN — INSULIN LISPRO 2 UNITS: 100 INJECTION, SOLUTION INTRAVENOUS; SUBCUTANEOUS at 17:33

## 2018-03-19 RX ADMIN — DEXTROSE AND SODIUM CHLORIDE: 5; 450 INJECTION, SOLUTION INTRAVENOUS at 01:22

## 2018-03-19 RX ADMIN — DICYCLOMINE HYDROCHLORIDE 20 MG: 20 INJECTION, SOLUTION INTRAMUSCULAR at 22:20

## 2018-03-19 ASSESSMENT — PAIN SCALES - GENERAL
PAINLEVEL_OUTOF10: 0
PAINLEVEL_OUTOF10: 0
PAINLEVEL_OUTOF10: 7
PAINLEVEL_OUTOF10: 0

## 2018-03-19 ASSESSMENT — PAIN DESCRIPTION - LOCATION: LOCATION: HEAD

## 2018-03-19 ASSESSMENT — PAIN DESCRIPTION - ONSET: ONSET: GRADUAL

## 2018-03-19 ASSESSMENT — PAIN DESCRIPTION - ORIENTATION: ORIENTATION: ANTERIOR;POSTERIOR;UPPER

## 2018-03-19 ASSESSMENT — PAIN DESCRIPTION - DESCRIPTORS: DESCRIPTORS: ACHING;DISCOMFORT;SORE

## 2018-03-19 ASSESSMENT — PAIN DESCRIPTION - FREQUENCY: FREQUENCY: INTERMITTENT

## 2018-03-19 ASSESSMENT — ENCOUNTER SYMPTOMS: NAUSEA: 1

## 2018-03-19 NOTE — H&P
[facility] Hospitalist Group History and Physical      CHIEF COMPLAINT:  Elevated blood glucose, shortness of breath, abdominal pain    History of Present Illness:  24-year-old female presenting to the emergency department initially for elevated blood glucose values at home. She is a known type I diabetic with frequent diabetes-related complications and admissions. Her initial evaluation in the emergency department consisted of blood work and a urinalysis. Her blood work showed a serum glucose of 715 mg/dL, anion gap of 15, borderline hyperkalemia,, normal venous pH. Urinalysis was performed and showed mild to moderate ketosis but clinically she looked dehydrated. This was felt to be related to dehydration rather than diabetic ketoacidosis given a normal anion gap and venous pH. She was bolused with 2 L of fluid which would represent greater than 30 MLS per kilogram given her body size. She was started on an insulin infusion as well, and in no bolus insulin was given. Repeat point of care chemistry showed improvement of blood glucose down to 4 88 mg/dL, calculated anion gap of 11, potassium 3.9. Given the patient's history, and the presence of insulin infusion, patient admitted to the ICU under hospitalist service. Patient seen and examined lying in bed. She is mildly ill and chronically ill-appearing. She states that she has recurrent issues with her blood sugar and is frustrated. She feels that despite no matter how well or little she takes care of herself she has problems. She has a documented history of noncompliance but claims that she was doing everything that she was supposed to prior to this ER visit. States that she was at her baseline state of health 2 days prior, last night she began to feel some mild abdominal discomfort and shortness of breath which is typical for her DKA symptoms. She checked her blood sugar at home and it was greater than the threshold of detection for her meter which is 600 mg/dL. She took her normal 26 units of Lantus at bedtime and sliding coverage and recheck sugars in the morning, followed her sliding scale abdomen the abdomen, and in the evening but was persistently greater than the threshold for detection of her meter. This prompted her to come into the emergency department for evaluation and treatment. Her ER course was reviewed above as mentioned. There are no infection-related complaints or symptoms reported. She denies any fevers or chills. Her appetite is baseline. His been no cough, no phlegm production. No chest pains, no palpitations, no fluttering chest, no near-syncopal symptoms or episodes of syncope. She does state that she has some mild shortness of breath and exertional dyspnea which she claims are classic for her DKA related complaints. She admits to some lower abdominal cramping and indicates the area around her bladder/suprapubic region. She denies any change in urinary sensation or dysuria, admits to some frequency of urination but with a decreased volume of urination. She denies any mj hematuria or flank pains. Denies any pain in the rest of the abdomen, admits to nausea without vomiting. Denies bowel pattern changes. Denies blood in the stools. Informant(s) for H&P:patient, EMR, ER physician    REVIEW OF SYSTEMS:  Pertinent items are noted in HPI.  A 12 point review of systems was conducted, and aside from that mentioned in HPI, was reviewed and negative    PMH:  Past Medical History:   Diagnosis Date    Chronic kidney disease     Depression     Diabetes mellitus ([facility] [address]     Diabetic ketoacidosis ([facility] [address] 2011    MRSA (methicillin resistant Staphylococcus aureus)     Non compliance w medication regimen 3/30/2016    Other disorders of kidney and ureter     Pregnancy 3/30/2016    16 weeks    Severe pre-eclampsia in third trimester 2016       Surgical History:  Past Surgical History:   Procedure Laterality Date    BACK SURGERY       SECTION  CHOLECYSTECTOMY      ECHO COMPL W DOP COLOR FLOW  1/31/2012    EF 57%    ECHO COMPL W DOP COLOR FLOW  6/10/2013            Medications Prior to Admission:    Prior to Admission medications    Medication Sig Start Date End Date Taking? Authorizing Provider   ondansetron (ZOFRAN ODT) 4 MG disintegrating tablet Take 2 tablets by mouth every 8 hours as needed for Nausea or Vomiting 9/30/17   Carlos A Montana MD   insulin glargine (LANTUS) 100 UNIT/ML injection vial Inject 25 Units into the skin nightly    Historical Provider, MD   insulin lispro (HUMALOG) 100 UNIT/ML injection vial Inject 0-10 Units into the skin 3 times daily (with meals)     Historical Provider, MD   glucose (GLUTOSE) 40 % GEL Take 15 g by mouth as needed 4/1/16   Jose Estrada MD   glucose blood VI test strips (ASCENSIA AUTODISC VI;ONE TOUCH ULTRA TEST VI) strip Indications: 5x a day Freestyle Lite -Tests 5 times daily and as needed for low glucose. E10.10 4/1/16   Jose Estrada MD   LANCETS THIN by Does not apply route. Indications: cks 5xa a day    Historical Provider, MD   Insulin Syringe-Needle U-100 (INSULIN SYRINGE .5CC/30GX1/2\") 30G X 1/2\" 0.5 ML MISC by Does not apply route. Indications: uses 6-7 a day    Historical Provider, MD       Allergies:    Patient has no known allergies. Social History:    reports that she has been smoking Cigarettes. She has a 1.75 pack-year smoking history. She has never used smokeless tobacco. She reports that she does not drink alcohol or use drugs. Family History:   family history includes Asthma in her brother and mother; Hypertension in her mother.       PHYSICAL EXAM:  Vitals:  /88   Pulse 80   Temp 98.3 °F (36.8 °C) (Oral)   Resp 14   Ht 5' 4\" (1.626 m)   Wt 141 lb (64 kg)   LMP 03/02/2018   SpO2 98%   BMI 24.20 kg/m²   General Appearance: alert and oriented to person, place and time, chronically ill appearing, non-toxic, in no acute distress  Skin: warm and dry, no rash or 03/18/2018    GFRAA >60 03/18/2018    LABGLOM >60 03/18/2018    GLUCOSE 715 03/18/2018    GLUCOSE 130 05/18/2012    PROT 6.9 03/18/2018    LABALBU 3.5 03/18/2018    LABALBU 4.1 05/18/2012    CALCIUM 9.0 03/18/2018    BILITOT 0.3 03/18/2018    ALKPHOS 138 03/18/2018    AST 11 03/18/2018    ALT 11 03/18/2018     U/A:    Lab Results   Component Value Date    COLORU Straw 03/18/2018    PROTEINU Negative 03/18/2018    PHUR 5.5 03/18/2018    LABCAST FEW 06/04/2015    WBCUA NONE 03/18/2018    WBCUA 0-1 05/18/2012    RBCUA 0-1 03/18/2018    RBCUA 1-3 08/01/2013    MUCUS Present 02/12/2016    YEAST MODERATE 03/10/2018    BACTERIA NONE 03/18/2018    CLARITYU Clear 03/18/2018    SPECGRAV <=1.005 03/18/2018    LEUKOCYTESUR Negative 03/18/2018    UROBILINOGEN 0.2 03/18/2018    BILIRUBINUR Negative 03/18/2018    BILIRUBINUR SMALL 05/18/2012    BLOODU TRACE 03/18/2018    GLUCOSEU >=1000 03/18/2018    GLUCOSEU >=1000 05/18/2012    AMORPHOUS RARE 09/14/2016     VBG:    Lab Results   Component Value Date    PHVEN 7.39 03/18/2018   Results for Liya Herrera (MRN [id number]) as of 3/18/2018 21:50   Ref. Range 3/18/2018 18:30   Beta-Hydroxybutyrate Latest Ref Range: 0.02 - 0.27 mmol/L 3.24 (H)   Results for Liya Herrera (MRN [id number]) as of 3/18/2018 21:50   Ref. Range 3/18/2018 21:40   POC Glucose Unknown 488   POC Sodium Unknown 140   POC Potassium Unknown 3.9   POC Anion Gap Unknown 15   POC BUN Unknown 21   POC Chloride Unknown 104   POC CO2 Unknown 25   POC Creatinine Unknown 0.9     Radiology: Chest xray ordered and pending. EKG: ordered and pending    ASSESSMENT:      Principal Problem:    Hyperglycemia due to type 1 diabetes mellitus ([address]  Active Problems:    Generalized abdominal pain    Tobacco abuse    Dehydration  Resolved Problems:    * No resolved hospital problems. *      PLAN:    1. Hyperglycemia due to type I diabetes mellitus- started on insulin infusion in ER, will admit to the ICU.  Continue insulin per the DKA protocol despite normal venous pH and normal anion gap. Initial anion gap of 9, repeat anion gap 11. Blood sugar down to 488 mg/dL from greater than 700 mg/dL with treatment in ER. Beta hydroxybutyrate elevated, ketones in the urine, venous pH 7.39. Serum osmolality added onto initial lab work to evaluate for hyperosmolar state. Urinalysis shows no signs of infection. Chest x-ray ordered to evaluate for possible infectious source as trigger. Patient is a known poorly controlled diabetic with history of noncompliance documented however, this is more likely the cause. 2. Dehydration- likely compounded by #1. Continue aggressive rehydration, additional bolus ordered, IV fluid ordered at aggressive rate for patient's body habitus. Monitor intake and output, frequent lab checks. 3. Shortness of breath and generalized abdominal pain-likely secondary to #1, patient states he started her typical symptoms with DKA and hyperglycemia related complications. Monitor for improvement with treatment for #1. Chest x-ray ordered and pending. EKG ordered and pending. 4. Tobacco abuse-patient refused to discuss her tobacco abuse with me, therefore I cannot appropriately order a nicotine replacement for her. If she feels comfortable discussing her tobacco recently further, I would feel comfortable prescribing tobacco/nicotine replacement treatment for her.     Code Status: FULL  DVT prophylaxis: Lovenox 40mg      Electronically signed by Lorenza Tong NP on 3/18/2018 at 10:14 PM

## 2018-03-19 NOTE — FLOWSHEET NOTE
Updated Dr. Suresh Daley on patient status. Patient's 5358 blood glucose was 143. Anion Gap closed  X 2. Patient to get BMP every 12 hours now instead of every 4 hours. BMP to start this evening at 2200. Dr. Suresh Daley adjusted the ibuprofen for the patient. SEE orders.

## 2018-03-20 VITALS
RESPIRATION RATE: 12 BRPM | WEIGHT: 141 LBS | OXYGEN SATURATION: 93 % | HEART RATE: 76 BPM | HEIGHT: 64 IN | BODY MASS INDEX: 24.07 KG/M2 | DIASTOLIC BLOOD PRESSURE: 96 MMHG | SYSTOLIC BLOOD PRESSURE: 139 MMHG | TEMPERATURE: 97.8 F

## 2018-03-20 PROBLEM — E10.10 DIABETIC KETOACIDOSIS WITHOUT COMA ASSOCIATED WITH TYPE 1 DIABETES MELLITUS (HCC): Status: ACTIVE | Noted: 2018-03-18

## 2018-03-20 LAB
ANION GAP SERPL CALCULATED.3IONS-SCNC: 10 MMOL/L (ref 7–16)
BASOPHILS ABSOLUTE: 0.08 E9/L (ref 0–0.2)
BASOPHILS RELATIVE PERCENT: 1 % (ref 0–2)
BUN BLDV-MCNC: 19 MG/DL (ref 6–20)
CALCIUM SERPL-MCNC: 8.5 MG/DL (ref 8.6–10.2)
CHLORIDE BLD-SCNC: 102 MMOL/L (ref 98–107)
CO2: 27 MMOL/L (ref 22–29)
CREAT SERPL-MCNC: 0.8 MG/DL (ref 0.5–1)
EOSINOPHILS ABSOLUTE: 0.3 E9/L (ref 0.05–0.5)
EOSINOPHILS RELATIVE PERCENT: 3.8 % (ref 0–6)
GFR AFRICAN AMERICAN: >60
GFR NON-AFRICAN AMERICAN: >60 ML/MIN/1.73
GLUCOSE BLD-MCNC: 120 MG/DL (ref 74–109)
HCT VFR BLD CALC: 33.7 % (ref 34–48)
HEMOGLOBIN: 11.2 G/DL (ref 11.5–15.5)
IMMATURE GRANULOCYTES #: 0.01 E9/L
IMMATURE GRANULOCYTES %: 0.1 % (ref 0–5)
LYMPHOCYTES ABSOLUTE: 3.03 E9/L (ref 1.5–4)
LYMPHOCYTES RELATIVE PERCENT: 37.9 % (ref 20–42)
MCH RBC QN AUTO: 30.4 PG (ref 26–35)
MCHC RBC AUTO-ENTMCNC: 33.2 % (ref 32–34.5)
MCV RBC AUTO: 91.3 FL (ref 80–99.9)
METER GLUCOSE: 111 MG/DL (ref 70–110)
MONOCYTES ABSOLUTE: 0.37 E9/L (ref 0.1–0.95)
MONOCYTES RELATIVE PERCENT: 4.6 % (ref 2–12)
MRSA CULTURE ONLY: NORMAL
NEUTROPHILS ABSOLUTE: 4.2 E9/L (ref 1.8–7.3)
NEUTROPHILS RELATIVE PERCENT: 52.6 % (ref 43–80)
PDW BLD-RTO: 11.8 FL (ref 11.5–15)
PLATELET # BLD: 245 E9/L (ref 130–450)
PMV BLD AUTO: 10.4 FL (ref 7–12)
POTASSIUM SERPL-SCNC: 4 MMOL/L (ref 3.5–5)
RBC # BLD: 3.69 E12/L (ref 3.5–5.5)
SODIUM BLD-SCNC: 139 MMOL/L (ref 132–146)
WBC # BLD: 8 E9/L (ref 4.5–11.5)

## 2018-03-20 PROCEDURE — 36415 COLL VENOUS BLD VENIPUNCTURE: CPT

## 2018-03-20 PROCEDURE — 80048 BASIC METABOLIC PNL TOTAL CA: CPT

## 2018-03-20 PROCEDURE — 6360000002 HC RX W HCPCS: Performed by: PHYSICIAN ASSISTANT

## 2018-03-20 PROCEDURE — 6360000002 HC RX W HCPCS: Performed by: NURSE PRACTITIONER

## 2018-03-20 PROCEDURE — 82962 GLUCOSE BLOOD TEST: CPT

## 2018-03-20 PROCEDURE — 85025 COMPLETE CBC W/AUTO DIFF WBC: CPT

## 2018-03-20 RX ORDER — NALBUPHINE HYDROCHLORIDE 20 MG/ML
10 INJECTION, SOLUTION INTRAMUSCULAR; INTRAVENOUS; SUBCUTANEOUS EVERY 4 HOURS PRN
Status: DISCONTINUED | OUTPATIENT
Start: 2018-03-20 | End: 2018-03-20 | Stop reason: HOSPADM

## 2018-03-20 RX ORDER — IBUPROFEN 800 MG/1
800 TABLET ORAL 3 TIMES DAILY PRN
Qty: 20 TABLET | Refills: 0 | Status: SHIPPED | OUTPATIENT
Start: 2018-03-20 | End: 2018-06-25

## 2018-03-20 RX ADMIN — NALBUPHINE HYDROCHLORIDE: 20 INJECTION, SOLUTION INTRAMUSCULAR; INTRAVENOUS; SUBCUTANEOUS at 03:33

## 2018-03-20 RX ADMIN — ENOXAPARIN SODIUM 40 MG: 40 INJECTION SUBCUTANEOUS at 08:38

## 2018-03-20 RX ADMIN — NALBUPHINE HYDROCHLORIDE 10 MG: 20 INJECTION, SOLUTION INTRAMUSCULAR; INTRAVENOUS; SUBCUTANEOUS at 08:29

## 2018-03-20 ASSESSMENT — PAIN DESCRIPTION - PAIN TYPE
TYPE: ACUTE PAIN
TYPE: ACUTE PAIN

## 2018-03-20 ASSESSMENT — PAIN SCALES - GENERAL
PAINLEVEL_OUTOF10: 0
PAINLEVEL_OUTOF10: 0
PAINLEVEL_OUTOF10: 8
PAINLEVEL_OUTOF10: 6
PAINLEVEL_OUTOF10: 6
PAINLEVEL_OUTOF10: 0

## 2018-03-20 ASSESSMENT — PAIN DESCRIPTION - ORIENTATION
ORIENTATION: LOWER;ANTERIOR;MID
ORIENTATION: MID;LOWER;OUTER

## 2018-03-20 ASSESSMENT — PAIN DESCRIPTION - LOCATION
LOCATION: ABDOMEN
LOCATION: ABDOMEN

## 2018-03-20 ASSESSMENT — PAIN DESCRIPTION - ONSET: ONSET: GRADUAL

## 2018-03-20 ASSESSMENT — PAIN DESCRIPTION - DESCRIPTORS
DESCRIPTORS: CONSTANT;CRAMPING;THROBBING
DESCRIPTORS: ACHING;DISCOMFORT;DULL

## 2018-03-20 ASSESSMENT — PAIN DESCRIPTION - FREQUENCY
FREQUENCY: CONTINUOUS
FREQUENCY: INTERMITTENT

## 2018-03-20 NOTE — PROGRESS NOTES
Patient is going home. Patient able to eat without distress. Patient to resume normal activity when she goes home. Patient has her prescriptions for a new glucometer, test strips, and ibuprofen to go home in her possession. Discharge teaching given.

## 2018-03-20 NOTE — DISCHARGE SUMMARY
insulin infusion as well, and in no bolus insulin was given. Repeat point of care chemistry showed improvement of blood glucose down to 4 88 mg/dL, calculated anion gap of 11, potassium 3.9. Given the patient's history, and the presence of insulin infusion, patient admitted to the ICU under hospitalist service.     Hospital Course: 23 yo female who was admitted as per above details. She was in ICU on insulin drip. Blood sugars were monitored until anion gap closed. She was transitioned to SSI and Lantus and observed overnight. Electrolytes were repleted. Nausea resolved. She will continue home insulin regimen and follow up with her PCP within next week. She also has upcoming appt with new endocrinologist in May. She requested new glucometer. She was given Rx for glucometer as well as test strips and supplies.     Discharge Exam:  Vitals:    03/20/18 0700 03/20/18 0701 03/20/18 0800 03/20/18 0801   BP: 115/61  112/81 112/81   Pulse: 59 60 60 77   Resp: 18 11 11 11   Temp:   97.8 °F (36.6 °C)    TempSrc:   Oral    SpO2: 92% 91% 94% 95%   Weight:       Height:         General Appearance: alert and in no acute distress  Skin: warm and dry, no rash or erythema  Head: normocephalic and atraumatic  Eyes: pupils equal, round, and reactive to light, extraocular eye movements intact, conjunctivae normal  ENT: external ear and ear canal normal bilaterally, nose without deformity  Neck: supple and non-tender without mass, no cervical lymphadenopathy  Pulmonary/Chest: clear to auscultation bilaterally- no wheezes, rales or rhonchi  Cardiovascular: normal rate, regular rhythm, normal S1 and S2, no murmurs, rubs, clicks, or gallops  Abdomen: soft, non-tender, non-distended, normal bowel sounds, no masses or organomegaly  Extremities: no cyanosis, clubbing or edema  Musculoskeletal: normal range of motion, no joint swelling, deformity or tenderness  Neurologic: reflexes normal and symmetric, no cranial nerve deficit, gait,

## 2018-04-03 ENCOUNTER — APPOINTMENT (OUTPATIENT)
Dept: GENERAL RADIOLOGY | Age: 28
DRG: 420 | End: 2018-04-03
Payer: COMMERCIAL

## 2018-04-03 ENCOUNTER — HOSPITAL ENCOUNTER (INPATIENT)
Age: 28
LOS: 2 days | Discharge: HOME | DRG: 420 | End: 2018-04-05
Attending: EMERGENCY MEDICINE | Admitting: INTERNAL MEDICINE
Payer: COMMERCIAL

## 2018-04-03 DIAGNOSIS — E10.10 DIABETIC KETOACIDOSIS WITHOUT COMA ASSOCIATED WITH TYPE 1 DIABETES MELLITUS (HCC): Primary | ICD-10-CM

## 2018-04-03 DIAGNOSIS — D64.9 ANEMIA, UNSPECIFIED TYPE: ICD-10-CM

## 2018-04-03 DIAGNOSIS — E87.5 HYPERKALEMIA: ICD-10-CM

## 2018-04-03 LAB
ALBUMIN SERPL-MCNC: 2.7 G/DL (ref 3.5–5.2)
ALP BLD-CCNC: 138 U/L (ref 35–104)
ALT SERPL-CCNC: 10 U/L (ref 0–32)
ANION GAP SERPL CALCULATED.3IONS-SCNC: 31 MMOL/L (ref 7–16)
AST SERPL-CCNC: 8 U/L (ref 0–31)
B.E.: -14.1 MMOL/L (ref -3–3)
BACTERIA: ABNORMAL /HPF
BASOPHILS ABSOLUTE: 0.07 E9/L (ref 0–0.2)
BASOPHILS RELATIVE PERCENT: 0.7 % (ref 0–2)
BETA-HYDROXYBUTYRATE: >4.5 MMOL/L (ref 0.02–0.27)
BILIRUB SERPL-MCNC: 0.2 MG/DL (ref 0–1.2)
BILIRUBIN URINE: NEGATIVE
BLOOD, URINE: ABNORMAL
BUN BLDV-MCNC: 22 MG/DL (ref 6–20)
CALCIUM SERPL-MCNC: 7.4 MG/DL (ref 8.6–10.2)
CHLORIDE BLD-SCNC: 101 MMOL/L (ref 98–107)
CHP ED QC CHECK: YES
CLARITY: CLEAR
CO2: 8 MMOL/L (ref 22–29)
COHB: 1.4 % (ref 0–1.5)
COLOR: ABNORMAL
CREAT SERPL-MCNC: 1 MG/DL (ref 0.5–1)
CRITICAL: ABNORMAL
DATE ANALYZED: ABNORMAL
DATE OF COLLECTION: ABNORMAL
EOSINOPHILS ABSOLUTE: 0.09 E9/L (ref 0.05–0.5)
EOSINOPHILS RELATIVE PERCENT: 0.9 % (ref 0–6)
GFR AFRICAN AMERICAN: >60
GFR NON-AFRICAN AMERICAN: >60 ML/MIN/1.73
GLUCOSE BLD-MCNC: 443 MG/DL
GLUCOSE BLD-MCNC: 537 MG/DL (ref 74–109)
GLUCOSE BLD-MCNC: 624 MG/DL
GLUCOSE URINE: >=1000 MG/DL
HCO3: 10.2 MMOL/L (ref 22–26)
HCT VFR BLD CALC: 29.7 % (ref 34–48)
HEMOGLOBIN: 9.6 G/DL (ref 11.5–15.5)
HHB: 2.1 % (ref 0–5)
IMMATURE GRANULOCYTES #: 0.06 E9/L
IMMATURE GRANULOCYTES %: 0.6 % (ref 0–5)
KETONES, URINE: >=80 MG/DL
LAB: ABNORMAL
LACTIC ACID: 1.9 MMOL/L (ref 0.5–2.2)
LEUKOCYTE ESTERASE, URINE: NEGATIVE
LYMPHOCYTES ABSOLUTE: 1.64 E9/L (ref 1.5–4)
LYMPHOCYTES RELATIVE PERCENT: 16.6 % (ref 20–42)
Lab: 1740
MCH RBC QN AUTO: 30.8 PG (ref 26–35)
MCHC RBC AUTO-ENTMCNC: 32.3 % (ref 32–34.5)
MCV RBC AUTO: 95.2 FL (ref 80–99.9)
METER GLUCOSE: 252 MG/DL (ref 70–110)
METER GLUCOSE: 313 MG/DL (ref 70–110)
METER GLUCOSE: 443 MG/DL (ref 70–110)
METER GLUCOSE: >500 MG/DL (ref 70–110)
METHB: 0.2 % (ref 0–1.5)
MODE: ABNORMAL
MONOCYTES ABSOLUTE: 0.35 E9/L (ref 0.1–0.95)
MONOCYTES RELATIVE PERCENT: 3.5 % (ref 2–12)
NEUTROPHILS ABSOLUTE: 7.66 E9/L (ref 1.8–7.3)
NEUTROPHILS RELATIVE PERCENT: 77.7 % (ref 43–80)
NITRITE, URINE: NEGATIVE
O2 CONTENT: 17.2 ML/DL
O2 SATURATION: 97.9 % (ref 92–98.5)
O2HB: 96.3 % (ref 94–97)
OPERATOR ID: 30
OSMOLALITY: 323 MOSM/KG (ref 285–310)
PATIENT TEMP: 37 C
PCO2: 21.2 MMHG (ref 35–45)
PDW BLD-RTO: 12.4 FL (ref 11.5–15)
PH BLOOD GAS: 7.3 (ref 7.35–7.45)
PH UA: 5.5 (ref 5–9)
PLATELET # BLD: 343 E9/L (ref 130–450)
PMV BLD AUTO: 10.1 FL (ref 7–12)
PO2: 106 MMHG (ref 60–100)
POC ANION GAP: 20
POC BUN: 36
POC CHLORIDE: 106
POC CO2: 13
POC CREATININE: 0.9
POC POTASSIUM: NORMAL
POC SODIUM: 131
POTASSIUM SERPL-SCNC: 4 MMOL/L (ref 3.5–5)
PREGNANCY TEST URINE, POC: NORMAL
PROTEIN UA: NEGATIVE MG/DL
RBC # BLD: 3.12 E12/L (ref 3.5–5.5)
RBC UA: ABNORMAL /HPF (ref 0–2)
SODIUM BLD-SCNC: 140 MMOL/L (ref 132–146)
SOURCE, BLOOD GAS: ABNORMAL
SPECIFIC GRAVITY UA: 1.01 (ref 1–1.03)
THB: 12.6 G/DL (ref 11.5–16.5)
TIME ANALYZED: 1743
TOTAL PROTEIN: 5.7 G/DL (ref 6.4–8.3)
TROPONIN: <0.01 NG/ML (ref 0–0.03)
UROBILINOGEN, URINE: 0.2 E.U./DL
WBC # BLD: 9.9 E9/L (ref 4.5–11.5)
WBC UA: ABNORMAL /HPF (ref 0–5)

## 2018-04-03 PROCEDURE — 36556 INSERT NON-TUNNEL CV CATH: CPT

## 2018-04-03 PROCEDURE — 80053 COMPREHEN METABOLIC PANEL: CPT

## 2018-04-03 PROCEDURE — 96374 THER/PROPH/DIAG INJ IV PUSH: CPT

## 2018-04-03 PROCEDURE — 81001 URINALYSIS AUTO W/SCOPE: CPT

## 2018-04-03 PROCEDURE — 83605 ASSAY OF LACTIC ACID: CPT

## 2018-04-03 PROCEDURE — 36415 COLL VENOUS BLD VENIPUNCTURE: CPT

## 2018-04-03 PROCEDURE — 82947 ASSAY GLUCOSE BLOOD QUANT: CPT

## 2018-04-03 PROCEDURE — 82565 ASSAY OF CREATININE: CPT

## 2018-04-03 PROCEDURE — 87077 CULTURE AEROBIC IDENTIFY: CPT

## 2018-04-03 PROCEDURE — 82010 KETONE BODYS QUAN: CPT

## 2018-04-03 PROCEDURE — 2580000003 HC RX 258: Performed by: NURSE PRACTITIONER

## 2018-04-03 PROCEDURE — 2580000003 HC RX 258: Performed by: EMERGENCY MEDICINE

## 2018-04-03 PROCEDURE — 82435 ASSAY OF BLOOD CHLORIDE: CPT

## 2018-04-03 PROCEDURE — 82374 ASSAY BLOOD CARBON DIOXIDE: CPT

## 2018-04-03 PROCEDURE — 2000000000 HC ICU R&B

## 2018-04-03 PROCEDURE — 84295 ASSAY OF SERUM SODIUM: CPT

## 2018-04-03 PROCEDURE — 6360000002 HC RX W HCPCS: Performed by: EMERGENCY MEDICINE

## 2018-04-03 PROCEDURE — 71045 X-RAY EXAM CHEST 1 VIEW: CPT

## 2018-04-03 PROCEDURE — 85025 COMPLETE CBC W/AUTO DIFF WBC: CPT

## 2018-04-03 PROCEDURE — 84132 ASSAY OF SERUM POTASSIUM: CPT

## 2018-04-03 PROCEDURE — 82962 GLUCOSE BLOOD TEST: CPT

## 2018-04-03 PROCEDURE — 87186 SC STD MICRODIL/AGAR DIL: CPT

## 2018-04-03 PROCEDURE — 83930 ASSAY OF BLOOD OSMOLALITY: CPT

## 2018-04-03 PROCEDURE — 85014 HEMATOCRIT: CPT

## 2018-04-03 PROCEDURE — 82805 BLOOD GASES W/O2 SATURATION: CPT

## 2018-04-03 PROCEDURE — 87088 URINE BACTERIA CULTURE: CPT

## 2018-04-03 PROCEDURE — 87040 BLOOD CULTURE FOR BACTERIA: CPT

## 2018-04-03 PROCEDURE — 84520 ASSAY OF UREA NITROGEN: CPT

## 2018-04-03 PROCEDURE — 6370000000 HC RX 637 (ALT 250 FOR IP): Performed by: EMERGENCY MEDICINE

## 2018-04-03 PROCEDURE — 84484 ASSAY OF TROPONIN QUANT: CPT

## 2018-04-03 PROCEDURE — 99285 EMERGENCY DEPT VISIT HI MDM: CPT

## 2018-04-03 RX ORDER — NICOTINE POLACRILEX 4 MG
15 LOZENGE BUCCAL PRN
Status: DISCONTINUED | OUTPATIENT
Start: 2018-04-03 | End: 2018-04-05 | Stop reason: HOSPADM

## 2018-04-03 RX ORDER — SODIUM CHLORIDE 0.9 % (FLUSH) 0.9 %
10 SYRINGE (ML) INJECTION PRN
Status: DISCONTINUED | OUTPATIENT
Start: 2018-04-03 | End: 2018-04-05 | Stop reason: HOSPADM

## 2018-04-03 RX ORDER — 0.9 % SODIUM CHLORIDE 0.9 %
1000 INTRAVENOUS SOLUTION INTRAVENOUS ONCE
Status: COMPLETED | OUTPATIENT
Start: 2018-04-03 | End: 2018-04-03

## 2018-04-03 RX ORDER — SODIUM CHLORIDE 9 MG/ML
INJECTION, SOLUTION INTRAVENOUS CONTINUOUS
Status: DISCONTINUED | OUTPATIENT
Start: 2018-04-03 | End: 2018-04-04

## 2018-04-03 RX ORDER — DEXTROSE MONOHYDRATE 25 G/50ML
12.5 INJECTION, SOLUTION INTRAVENOUS PRN
Status: DISCONTINUED | OUTPATIENT
Start: 2018-04-03 | End: 2018-04-05 | Stop reason: HOSPADM

## 2018-04-03 RX ORDER — 0.9 % SODIUM CHLORIDE 0.9 %
2000 INTRAVENOUS SOLUTION INTRAVENOUS ONCE
Status: COMPLETED | OUTPATIENT
Start: 2018-04-03 | End: 2018-04-03

## 2018-04-03 RX ORDER — ACETAMINOPHEN 325 MG/1
650 TABLET ORAL EVERY 4 HOURS PRN
Status: DISCONTINUED | OUTPATIENT
Start: 2018-04-03 | End: 2018-04-05 | Stop reason: HOSPADM

## 2018-04-03 RX ORDER — ACETAMINOPHEN 325 MG/1
650 TABLET ORAL ONCE
Status: DISCONTINUED | OUTPATIENT
Start: 2018-04-03 | End: 2018-04-05 | Stop reason: HOSPADM

## 2018-04-03 RX ORDER — MAGNESIUM SULFATE 1 G/100ML
1 INJECTION INTRAVENOUS PRN
Status: DISCONTINUED | OUTPATIENT
Start: 2018-04-03 | End: 2018-04-05 | Stop reason: HOSPADM

## 2018-04-03 RX ORDER — POTASSIUM CHLORIDE 7.45 MG/ML
10 INJECTION INTRAVENOUS PRN
Status: DISCONTINUED | OUTPATIENT
Start: 2018-04-03 | End: 2018-04-05 | Stop reason: HOSPADM

## 2018-04-03 RX ORDER — DEXTROSE MONOHYDRATE 50 MG/ML
100 INJECTION, SOLUTION INTRAVENOUS PRN
Status: DISCONTINUED | OUTPATIENT
Start: 2018-04-03 | End: 2018-04-05 | Stop reason: HOSPADM

## 2018-04-03 RX ORDER — DEXTROSE AND SODIUM CHLORIDE 5; .45 G/100ML; G/100ML
INJECTION, SOLUTION INTRAVENOUS CONTINUOUS PRN
Status: DISCONTINUED | OUTPATIENT
Start: 2018-04-03 | End: 2018-04-04

## 2018-04-03 RX ORDER — SODIUM CHLORIDE 0.9 % (FLUSH) 0.9 %
10 SYRINGE (ML) INJECTION EVERY 12 HOURS SCHEDULED
Status: DISCONTINUED | OUTPATIENT
Start: 2018-04-03 | End: 2018-04-05 | Stop reason: HOSPADM

## 2018-04-03 RX ADMIN — CALCIUM GLUCONATE 1 G: 98 INJECTION, SOLUTION INTRAVENOUS at 19:44

## 2018-04-03 RX ADMIN — PROCHLORPERAZINE EDISYLATE 5 MG: 5 INJECTION INTRAMUSCULAR; INTRAVENOUS at 18:05

## 2018-04-03 RX ADMIN — SODIUM CHLORIDE 1000 ML: 9 INJECTION, SOLUTION INTRAVENOUS at 19:44

## 2018-04-03 RX ADMIN — SODIUM CHLORIDE 2000 ML: 9 INJECTION, SOLUTION INTRAVENOUS at 18:05

## 2018-04-03 RX ADMIN — SODIUM CHLORIDE: 9 INJECTION, SOLUTION INTRAVENOUS at 22:04

## 2018-04-03 RX ADMIN — SODIUM CHLORIDE 0.1 UNITS/KG/HR: 9 INJECTION, SOLUTION INTRAVENOUS at 19:44

## 2018-04-03 RX ADMIN — Medication 10 ML: at 22:05

## 2018-04-03 ASSESSMENT — ENCOUNTER SYMPTOMS
CONSTIPATION: 0
SHORTNESS OF BREATH: 0
CHEST TIGHTNESS: 0
TROUBLE SWALLOWING: 0
COUGH: 0
ABDOMINAL PAIN: 1
SORE THROAT: 0
DIARRHEA: 0
WHEEZING: 0
VOMITING: 1
RHINORRHEA: 0
BLOOD IN STOOL: 0
NAUSEA: 1

## 2018-04-03 ASSESSMENT — PAIN DESCRIPTION - PAIN TYPE: TYPE: ACUTE PAIN

## 2018-04-03 ASSESSMENT — PAIN DESCRIPTION - LOCATION: LOCATION: ABDOMEN

## 2018-04-03 ASSESSMENT — PAIN DESCRIPTION - ORIENTATION: ORIENTATION: MID;LOWER

## 2018-04-03 ASSESSMENT — PAIN SCALES - GENERAL: PAINLEVEL_OUTOF10: 3

## 2018-04-03 ASSESSMENT — PAIN DESCRIPTION - DESCRIPTORS: DESCRIPTORS: ACHING;DISCOMFORT

## 2018-04-04 LAB
ANION GAP SERPL CALCULATED.3IONS-SCNC: 10 MMOL/L (ref 7–16)
ANION GAP SERPL CALCULATED.3IONS-SCNC: 14 MMOL/L (ref 7–16)
ANION GAP SERPL CALCULATED.3IONS-SCNC: 14 MMOL/L (ref 7–16)
BASOPHILS ABSOLUTE: 0.09 E9/L (ref 0–0.2)
BASOPHILS RELATIVE PERCENT: 0.8 % (ref 0–2)
BUN BLDV-MCNC: 12 MG/DL (ref 6–20)
BUN BLDV-MCNC: 17 MG/DL (ref 6–20)
BUN BLDV-MCNC: 20 MG/DL (ref 6–20)
CALCIUM SERPL-MCNC: 7.8 MG/DL (ref 8.6–10.2)
CALCIUM SERPL-MCNC: 7.9 MG/DL (ref 8.6–10.2)
CALCIUM SERPL-MCNC: 8.2 MG/DL (ref 8.6–10.2)
CHLORIDE BLD-SCNC: 103 MMOL/L (ref 98–107)
CHLORIDE BLD-SCNC: 107 MMOL/L (ref 98–107)
CHLORIDE BLD-SCNC: 108 MMOL/L (ref 98–107)
CO2: 17 MMOL/L (ref 22–29)
CO2: 19 MMOL/L (ref 22–29)
CO2: 20 MMOL/L (ref 22–29)
CREAT SERPL-MCNC: 0.8 MG/DL (ref 0.5–1)
CREAT SERPL-MCNC: 0.8 MG/DL (ref 0.5–1)
CREAT SERPL-MCNC: 0.9 MG/DL (ref 0.5–1)
EOSINOPHILS ABSOLUTE: 0.18 E9/L (ref 0.05–0.5)
EOSINOPHILS RELATIVE PERCENT: 1.6 % (ref 0–6)
GFR AFRICAN AMERICAN: >60
GFR NON-AFRICAN AMERICAN: >60 ML/MIN/1.73
GLUCOSE BLD-MCNC: 102 MG/DL (ref 74–109)
GLUCOSE BLD-MCNC: 186 MG/DL (ref 74–109)
GLUCOSE BLD-MCNC: 201 MG/DL (ref 74–109)
HCT VFR BLD CALC: 26.8 % (ref 34–48)
HEMOGLOBIN: 9.1 G/DL (ref 11.5–15.5)
IMMATURE GRANULOCYTES #: 0.04 E9/L
IMMATURE GRANULOCYTES %: 0.4 % (ref 0–5)
LYMPHOCYTES ABSOLUTE: 3.54 E9/L (ref 1.5–4)
LYMPHOCYTES RELATIVE PERCENT: 32.2 % (ref 20–42)
MAGNESIUM: 1.6 MG/DL (ref 1.6–2.6)
MAGNESIUM: 1.9 MG/DL (ref 1.6–2.6)
MAGNESIUM: 1.9 MG/DL (ref 1.6–2.6)
MCH RBC QN AUTO: 31.2 PG (ref 26–35)
MCHC RBC AUTO-ENTMCNC: 34 % (ref 32–34.5)
MCV RBC AUTO: 91.8 FL (ref 80–99.9)
METER GLUCOSE: 110 MG/DL (ref 70–110)
METER GLUCOSE: 110 MG/DL (ref 70–110)
METER GLUCOSE: 126 MG/DL (ref 70–110)
METER GLUCOSE: 159 MG/DL (ref 70–110)
METER GLUCOSE: 166 MG/DL (ref 70–110)
METER GLUCOSE: 191 MG/DL (ref 70–110)
METER GLUCOSE: 195 MG/DL (ref 70–110)
METER GLUCOSE: 229 MG/DL (ref 70–110)
METER GLUCOSE: 418 MG/DL (ref 70–110)
MONOCYTES ABSOLUTE: 0.78 E9/L (ref 0.1–0.95)
MONOCYTES RELATIVE PERCENT: 7.1 % (ref 2–12)
NEUTROPHILS ABSOLUTE: 6.35 E9/L (ref 1.8–7.3)
NEUTROPHILS RELATIVE PERCENT: 57.9 % (ref 43–80)
PDW BLD-RTO: 12.6 FL (ref 11.5–15)
PHOSPHORUS: 2 MG/DL (ref 2.5–4.5)
PHOSPHORUS: 2.6 MG/DL (ref 2.5–4.5)
PHOSPHORUS: 2.8 MG/DL (ref 2.5–4.5)
PLATELET # BLD: 317 E9/L (ref 130–450)
PMV BLD AUTO: 9.4 FL (ref 7–12)
POTASSIUM REFLEX MAGNESIUM: 3.6 MMOL/L (ref 3.5–5)
POTASSIUM REFLEX MAGNESIUM: 4.2 MMOL/L (ref 3.5–5)
POTASSIUM SERPL-SCNC: 4.1 MMOL/L (ref 3.5–5)
RBC # BLD: 2.92 E12/L (ref 3.5–5.5)
SODIUM BLD-SCNC: 136 MMOL/L (ref 132–146)
SODIUM BLD-SCNC: 138 MMOL/L (ref 132–146)
SODIUM BLD-SCNC: 138 MMOL/L (ref 132–146)
WBC # BLD: 11 E9/L (ref 4.5–11.5)

## 2018-04-04 PROCEDURE — 2580000003 HC RX 258: Performed by: INTERNAL MEDICINE

## 2018-04-04 PROCEDURE — 84100 ASSAY OF PHOSPHORUS: CPT

## 2018-04-04 PROCEDURE — 2580000003 HC RX 258: Performed by: NURSE PRACTITIONER

## 2018-04-04 PROCEDURE — 80048 BASIC METABOLIC PNL TOTAL CA: CPT

## 2018-04-04 PROCEDURE — 1200000000 HC SEMI PRIVATE

## 2018-04-04 PROCEDURE — 6370000000 HC RX 637 (ALT 250 FOR IP): Performed by: INTERNAL MEDICINE

## 2018-04-04 PROCEDURE — 2500000003 HC RX 250 WO HCPCS: Performed by: INTERNAL MEDICINE

## 2018-04-04 PROCEDURE — 85025 COMPLETE CBC W/AUTO DIFF WBC: CPT

## 2018-04-04 PROCEDURE — 82962 GLUCOSE BLOOD TEST: CPT

## 2018-04-04 PROCEDURE — 6360000002 HC RX W HCPCS: Performed by: NURSE PRACTITIONER

## 2018-04-04 PROCEDURE — 6360000002 HC RX W HCPCS: Performed by: INTERNAL MEDICINE

## 2018-04-04 PROCEDURE — 6370000000 HC RX 637 (ALT 250 FOR IP): Performed by: NURSE PRACTITIONER

## 2018-04-04 PROCEDURE — 87081 CULTURE SCREEN ONLY: CPT

## 2018-04-04 PROCEDURE — 83735 ASSAY OF MAGNESIUM: CPT

## 2018-04-04 PROCEDURE — 2500000003 HC RX 250 WO HCPCS: Performed by: NURSE PRACTITIONER

## 2018-04-04 PROCEDURE — 36415 COLL VENOUS BLD VENIPUNCTURE: CPT

## 2018-04-04 RX ORDER — IBUPROFEN 800 MG/1
400 TABLET ORAL EVERY 6 HOURS PRN
Status: DISCONTINUED | OUTPATIENT
Start: 2018-04-04 | End: 2018-04-05 | Stop reason: HOSPADM

## 2018-04-04 RX ORDER — INSULIN GLARGINE 100 [IU]/ML
25 INJECTION, SOLUTION SUBCUTANEOUS NIGHTLY
Status: DISCONTINUED | OUTPATIENT
Start: 2018-04-04 | End: 2018-04-05 | Stop reason: HOSPADM

## 2018-04-04 RX ORDER — ONDANSETRON 2 MG/ML
4 INJECTION INTRAMUSCULAR; INTRAVENOUS EVERY 6 HOURS PRN
Status: DISCONTINUED | OUTPATIENT
Start: 2018-04-04 | End: 2018-04-05 | Stop reason: HOSPADM

## 2018-04-04 RX ADMIN — INSULIN LISPRO 12 UNITS: 100 INJECTION, SOLUTION INTRAVENOUS; SUBCUTANEOUS at 16:59

## 2018-04-04 RX ADMIN — POTASSIUM PHOSPHATE, MONOBASIC AND POTASSIUM PHOSPHATE, DIBASIC 30 MMOL: 224; 236 INJECTION, SOLUTION INTRAVENOUS at 13:50

## 2018-04-04 RX ADMIN — SODIUM CHLORIDE: 9 INJECTION, SOLUTION INTRAVENOUS at 16:59

## 2018-04-04 RX ADMIN — Medication 10 ML: at 09:00

## 2018-04-04 RX ADMIN — Medication 10 ML: at 05:45

## 2018-04-04 RX ADMIN — Medication 10 ML: at 05:46

## 2018-04-04 RX ADMIN — INSULIN LISPRO 2 UNITS: 100 INJECTION, SOLUTION INTRAVENOUS; SUBCUTANEOUS at 20:13

## 2018-04-04 RX ADMIN — INSULIN GLARGINE 25 UNITS: 100 INJECTION, SOLUTION SUBCUTANEOUS at 20:12

## 2018-04-04 RX ADMIN — POTASSIUM CHLORIDE: 2 INJECTION, SOLUTION, CONCENTRATE INTRAVENOUS at 05:39

## 2018-04-04 RX ADMIN — ONDANSETRON 4 MG: 2 INJECTION INTRAMUSCULAR; INTRAVENOUS at 17:41

## 2018-04-04 RX ADMIN — MAGNESIUM SULFATE HEPTAHYDRATE 1 G: 1 INJECTION, SOLUTION INTRAVENOUS at 14:05

## 2018-04-04 RX ADMIN — MAGNESIUM SULFATE HEPTAHYDRATE 1 G: 1 INJECTION, SOLUTION INTRAVENOUS at 13:16

## 2018-04-04 RX ADMIN — ENOXAPARIN SODIUM 40 MG: 40 INJECTION SUBCUTANEOUS at 09:00

## 2018-04-04 RX ADMIN — DEXTROSE AND SODIUM CHLORIDE: 5; 450 INJECTION, SOLUTION INTRAVENOUS at 10:02

## 2018-04-04 RX ADMIN — ONDANSETRON 4 MG: 2 INJECTION INTRAMUSCULAR; INTRAVENOUS at 09:38

## 2018-04-04 RX ADMIN — Medication 10 ML: at 20:15

## 2018-04-04 RX ADMIN — INSULIN LISPRO 5 UNITS: 100 INJECTION, SOLUTION INTRAVENOUS; SUBCUTANEOUS at 17:11

## 2018-04-04 RX ADMIN — DEXTROSE AND SODIUM CHLORIDE: 5; 450 INJECTION, SOLUTION INTRAVENOUS at 00:00

## 2018-04-04 RX ADMIN — INSULIN LISPRO 2 UNITS: 100 INJECTION, SOLUTION INTRAVENOUS; SUBCUTANEOUS at 08:00

## 2018-04-04 RX ADMIN — IBUPROFEN 400 MG: 800 TABLET, FILM COATED ORAL at 21:30

## 2018-04-04 RX ADMIN — SODIUM PHOSPHATE, MONOBASIC, MONOHYDRATE 10 MMOL: 276; 142 INJECTION, SOLUTION INTRAVENOUS at 05:40

## 2018-04-04 RX ADMIN — INSULIN LISPRO 2 UNITS: 100 INJECTION, SOLUTION INTRAVENOUS; SUBCUTANEOUS at 13:29

## 2018-04-04 ASSESSMENT — PAIN DESCRIPTION - PAIN TYPE
TYPE: CHRONIC PAIN
TYPE: CHRONIC PAIN

## 2018-04-04 ASSESSMENT — PAIN DESCRIPTION - DESCRIPTORS
DESCRIPTORS: CONSTANT;CRAMPING
DESCRIPTORS: CONSTANT;CRAMPING

## 2018-04-04 ASSESSMENT — PAIN DESCRIPTION - LOCATION
LOCATION: ABDOMEN
LOCATION: ABDOMEN

## 2018-04-04 ASSESSMENT — PAIN SCALES - GENERAL
PAINLEVEL_OUTOF10: 6
PAINLEVEL_OUTOF10: 7
PAINLEVEL_OUTOF10: 5

## 2018-04-04 ASSESSMENT — PAIN DESCRIPTION - PROGRESSION
CLINICAL_PROGRESSION: NOT CHANGED
CLINICAL_PROGRESSION: NOT CHANGED

## 2018-04-04 ASSESSMENT — PAIN DESCRIPTION - FREQUENCY
FREQUENCY: CONTINUOUS
FREQUENCY: CONTINUOUS

## 2018-04-04 ASSESSMENT — PAIN DESCRIPTION - ORIENTATION
ORIENTATION: LOWER;RIGHT;LEFT
ORIENTATION: LOWER;MID

## 2018-04-04 ASSESSMENT — PAIN DESCRIPTION - ONSET
ONSET: ON-GOING
ONSET: ON-GOING

## 2018-04-05 VITALS
TEMPERATURE: 97.7 F | WEIGHT: 146.2 LBS | HEIGHT: 64 IN | SYSTOLIC BLOOD PRESSURE: 139 MMHG | RESPIRATION RATE: 15 BRPM | OXYGEN SATURATION: 98 % | BODY MASS INDEX: 24.96 KG/M2 | DIASTOLIC BLOOD PRESSURE: 90 MMHG | HEART RATE: 102 BPM

## 2018-04-05 LAB
ANION GAP SERPL CALCULATED.3IONS-SCNC: 10 MMOL/L (ref 7–16)
BUN BLDV-MCNC: 13 MG/DL (ref 6–20)
CALCIUM SERPL-MCNC: 8.1 MG/DL (ref 8.6–10.2)
CHLORIDE BLD-SCNC: 105 MMOL/L (ref 98–107)
CO2: 21 MMOL/L (ref 22–29)
CREAT SERPL-MCNC: 0.8 MG/DL (ref 0.5–1)
GFR AFRICAN AMERICAN: >60
GFR NON-AFRICAN AMERICAN: >60 ML/MIN/1.73
GLUCOSE BLD-MCNC: 176 MG/DL (ref 74–109)
HCT VFR BLD CALC: 28.9 % (ref 34–48)
HEMOGLOBIN: 9.7 G/DL (ref 11.5–15.5)
MAGNESIUM: 2 MG/DL (ref 1.6–2.6)
MCH RBC QN AUTO: 30.8 PG (ref 26–35)
MCHC RBC AUTO-ENTMCNC: 33.6 % (ref 32–34.5)
MCV RBC AUTO: 91.7 FL (ref 80–99.9)
METER GLUCOSE: 181 MG/DL (ref 70–110)
METER GLUCOSE: 194 MG/DL (ref 70–110)
METER GLUCOSE: 198 MG/DL (ref 70–110)
METER GLUCOSE: 212 MG/DL (ref 70–110)
METER GLUCOSE: 66 MG/DL (ref 70–110)
METER GLUCOSE: 76 MG/DL (ref 70–110)
METER GLUCOSE: <40 MG/DL (ref 70–110)
METER GLUCOSE: <40 MG/DL (ref 70–110)
MRSA CULTURE ONLY: NORMAL
PDW BLD-RTO: 12.2 FL (ref 11.5–15)
PHOSPHORUS: 2.8 MG/DL (ref 2.5–4.5)
PLATELET # BLD: 296 E9/L (ref 130–450)
PMV BLD AUTO: 9.4 FL (ref 7–12)
POTASSIUM SERPL-SCNC: 3.5 MMOL/L (ref 3.5–5)
RBC # BLD: 3.15 E12/L (ref 3.5–5.5)
SODIUM BLD-SCNC: 136 MMOL/L (ref 132–146)
WBC # BLD: 7.7 E9/L (ref 4.5–11.5)

## 2018-04-05 PROCEDURE — 6360000002 HC RX W HCPCS: Performed by: NURSE PRACTITIONER

## 2018-04-05 PROCEDURE — 6370000000 HC RX 637 (ALT 250 FOR IP): Performed by: NURSE PRACTITIONER

## 2018-04-05 PROCEDURE — 2580000003 HC RX 258: Performed by: INTERNAL MEDICINE

## 2018-04-05 PROCEDURE — 85027 COMPLETE CBC AUTOMATED: CPT

## 2018-04-05 PROCEDURE — 2580000003 HC RX 258: Performed by: NURSE PRACTITIONER

## 2018-04-05 PROCEDURE — 82962 GLUCOSE BLOOD TEST: CPT

## 2018-04-05 PROCEDURE — 36415 COLL VENOUS BLD VENIPUNCTURE: CPT

## 2018-04-05 PROCEDURE — 6360000002 HC RX W HCPCS: Performed by: INTERNAL MEDICINE

## 2018-04-05 PROCEDURE — 80048 BASIC METABOLIC PNL TOTAL CA: CPT

## 2018-04-05 PROCEDURE — 83735 ASSAY OF MAGNESIUM: CPT

## 2018-04-05 PROCEDURE — 6370000000 HC RX 637 (ALT 250 FOR IP): Performed by: INTERNAL MEDICINE

## 2018-04-05 PROCEDURE — 84100 ASSAY OF PHOSPHORUS: CPT

## 2018-04-05 RX ORDER — CEFDINIR 300 MG/1
300 CAPSULE ORAL 2 TIMES DAILY
Qty: 10 CAPSULE | Refills: 0 | Status: SHIPPED | OUTPATIENT
Start: 2018-04-06 | End: 2018-04-11

## 2018-04-05 RX ORDER — KETOROLAC TROMETHAMINE 15 MG/ML
15 INJECTION, SOLUTION INTRAMUSCULAR; INTRAVENOUS ONCE
Status: COMPLETED | OUTPATIENT
Start: 2018-04-05 | End: 2018-04-05

## 2018-04-05 RX ADMIN — CEFTRIAXONE 1 G: 1 INJECTION, POWDER, FOR SOLUTION INTRAMUSCULAR; INTRAVENOUS at 13:30

## 2018-04-05 RX ADMIN — POTASSIUM CHLORIDE: 149 INJECTION, SOLUTION, CONCENTRATE INTRAVENOUS at 10:47

## 2018-04-05 RX ADMIN — DEXTROSE MONOHYDRATE 12.5 G: 25 INJECTION, SOLUTION INTRAVENOUS at 05:27

## 2018-04-05 RX ADMIN — ENOXAPARIN SODIUM 40 MG: 40 INJECTION SUBCUTANEOUS at 08:54

## 2018-04-05 RX ADMIN — INSULIN LISPRO 5 UNITS: 100 INJECTION, SOLUTION INTRAVENOUS; SUBCUTANEOUS at 08:58

## 2018-04-05 RX ADMIN — IBUPROFEN 400 MG: 800 TABLET, FILM COATED ORAL at 13:08

## 2018-04-05 RX ADMIN — Medication 10 ML: at 08:54

## 2018-04-05 RX ADMIN — INSULIN LISPRO 2 UNITS: 100 INJECTION, SOLUTION INTRAVENOUS; SUBCUTANEOUS at 08:59

## 2018-04-05 RX ADMIN — IBUPROFEN 400 MG: 800 TABLET, FILM COATED ORAL at 05:32

## 2018-04-05 RX ADMIN — KETOROLAC TROMETHAMINE 15 MG: 15 INJECTION, SOLUTION INTRAMUSCULAR; INTRAVENOUS at 10:47

## 2018-04-05 ASSESSMENT — PAIN DESCRIPTION - PROGRESSION
CLINICAL_PROGRESSION: NOT CHANGED

## 2018-04-05 ASSESSMENT — PAIN SCALES - GENERAL
PAINLEVEL_OUTOF10: 6
PAINLEVEL_OUTOF10: 4
PAINLEVEL_OUTOF10: 6
PAINLEVEL_OUTOF10: 8

## 2018-04-05 ASSESSMENT — PAIN DESCRIPTION - FREQUENCY: FREQUENCY: CONTINUOUS

## 2018-04-05 ASSESSMENT — PAIN DESCRIPTION - ONSET: ONSET: ON-GOING

## 2018-04-05 ASSESSMENT — PAIN DESCRIPTION - DESCRIPTORS
DESCRIPTORS: HEADACHE
DESCRIPTORS: HEADACHE

## 2018-04-05 ASSESSMENT — PAIN DESCRIPTION - LOCATION: LOCATION: HEAD

## 2018-04-05 ASSESSMENT — PAIN DESCRIPTION - PAIN TYPE
TYPE: ACUTE PAIN
TYPE: ACUTE PAIN

## 2018-04-06 LAB
ORGANISM: ABNORMAL
URINE CULTURE, ROUTINE: ABNORMAL
URINE CULTURE, ROUTINE: ABNORMAL

## 2018-04-07 LAB
EKG ATRIAL RATE: 85 BPM
EKG P AXIS: 42 DEGREES
EKG P-R INTERVAL: 108 MS
EKG Q-T INTERVAL: 350 MS
EKG QRS DURATION: 80 MS
EKG QTC CALCULATION (BAZETT): 416 MS
EKG R AXIS: 55 DEGREES
EKG T AXIS: 41 DEGREES
EKG VENTRICULAR RATE: 85 BPM

## 2018-04-08 LAB
BLOOD CULTURE, ROUTINE: NORMAL
CULTURE, BLOOD 2: NORMAL

## 2018-04-15 ENCOUNTER — HOSPITAL ENCOUNTER (INPATIENT)
Age: 28
LOS: 2 days | Discharge: HOME | DRG: 720 | End: 2018-04-17
Attending: HOSPITALIST | Admitting: HOSPITALIST
Payer: COMMERCIAL

## 2018-04-15 ENCOUNTER — APPOINTMENT (OUTPATIENT)
Dept: GENERAL RADIOLOGY | Age: 28
DRG: 720 | End: 2018-04-15
Payer: COMMERCIAL

## 2018-04-15 DIAGNOSIS — E10.10 DIABETIC KETOACIDOSIS WITHOUT COMA ASSOCIATED WITH TYPE 1 DIABETES MELLITUS (HCC): Primary | ICD-10-CM

## 2018-04-15 DIAGNOSIS — N39.0 URINARY TRACT INFECTION, ACUTE: ICD-10-CM

## 2018-04-15 DIAGNOSIS — A41.9 SEPSIS, DUE TO UNSPECIFIED ORGANISM: ICD-10-CM

## 2018-04-15 DIAGNOSIS — E86.0 DEHYDRATION: ICD-10-CM

## 2018-04-15 DIAGNOSIS — G89.18 POSTOPERATIVE PAIN: ICD-10-CM

## 2018-04-15 LAB
ACETAMINOPHEN LEVEL: <15 MCG/ML (ref 10–30)
ALBUMIN SERPL-MCNC: 3.7 G/DL (ref 3.5–5.2)
ALP BLD-CCNC: 177 U/L (ref 35–104)
ALT SERPL-CCNC: 18 U/L (ref 0–32)
AMPHETAMINE SCREEN, URINE: NOT DETECTED
ANION GAP SERPL CALCULATED.3IONS-SCNC: 10 MMOL/L (ref 7–16)
ANION GAP SERPL CALCULATED.3IONS-SCNC: 12 MMOL/L (ref 7–16)
ANION GAP SERPL CALCULATED.3IONS-SCNC: 24 MMOL/L (ref 7–16)
AST SERPL-CCNC: 13 U/L (ref 0–31)
BACTERIA: ABNORMAL /HPF
BARBITURATE SCREEN URINE: NOT DETECTED
BASOPHILS ABSOLUTE: 0.09 E9/L (ref 0–0.2)
BASOPHILS RELATIVE PERCENT: 1.6 % (ref 0–2)
BENZODIAZEPINE SCREEN, URINE: NOT DETECTED
BETA-HYDROXYBUTYRATE: >4.5 MMOL/L (ref 0.02–0.27)
BILIRUB SERPL-MCNC: 0.3 MG/DL (ref 0–1.2)
BILIRUBIN URINE: NEGATIVE
BLOOD, URINE: ABNORMAL
BUN BLDV-MCNC: 12 MG/DL (ref 6–20)
BUN BLDV-MCNC: 15 MG/DL (ref 6–20)
BUN BLDV-MCNC: 20 MG/DL (ref 6–20)
CALCIUM SERPL-MCNC: 7.3 MG/DL (ref 8.6–10.2)
CALCIUM SERPL-MCNC: 7.4 MG/DL (ref 8.6–10.2)
CALCIUM SERPL-MCNC: 9.2 MG/DL (ref 8.6–10.2)
CANNABINOID SCREEN URINE: NOT DETECTED
CHLORIDE BLD-SCNC: 105 MMOL/L (ref 98–107)
CHLORIDE BLD-SCNC: 106 MMOL/L (ref 98–107)
CHLORIDE BLD-SCNC: 87 MMOL/L (ref 98–107)
CHP ED QC CHECK: YES
CHP ED QC CHECK: YES
CLARITY: ABNORMAL
CO2: 19 MMOL/L (ref 22–29)
CO2: 21 MMOL/L (ref 22–29)
CO2: 23 MMOL/L (ref 22–29)
COCAINE METABOLITE SCREEN URINE: NOT DETECTED
COLOR: ABNORMAL
CREAT SERPL-MCNC: 0.7 MG/DL (ref 0.5–1)
CREAT SERPL-MCNC: 0.8 MG/DL (ref 0.5–1)
CREAT SERPL-MCNC: 1 MG/DL (ref 0.5–1)
EOSINOPHILS ABSOLUTE: 0.2 E9/L (ref 0.05–0.5)
EOSINOPHILS RELATIVE PERCENT: 3.5 % (ref 0–6)
EPITHELIAL CELLS, UA: ABNORMAL /HPF
ETHANOL: <10 MG/DL (ref 0–0.08)
GFR AFRICAN AMERICAN: >60
GFR NON-AFRICAN AMERICAN: >60 ML/MIN/1.73
GLUCOSE BLD-MCNC: 193 MG/DL (ref 74–109)
GLUCOSE BLD-MCNC: 810 MG/DL (ref 74–109)
GLUCOSE BLD-MCNC: 94 MG/DL (ref 74–109)
GLUCOSE BLD-MCNC: NORMAL MG/DL
GLUCOSE URINE: >=1000 MG/DL
HBA1C MFR BLD: 13.6 % (ref 4.8–5.9)
HCT VFR BLD CALC: 37.2 % (ref 34–48)
HEMOGLOBIN: 11.7 G/DL (ref 11.5–15.5)
IMMATURE GRANULOCYTES #: 0.01 E9/L
IMMATURE GRANULOCYTES %: 0.2 % (ref 0–5)
KETONES, URINE: 40 MG/DL
LACTIC ACID: 1.9 MMOL/L (ref 0.5–2.2)
LEUKOCYTE ESTERASE, URINE: ABNORMAL
LIPASE: 8 U/L (ref 13–60)
LYMPHOCYTES ABSOLUTE: 1.45 E9/L (ref 1.5–4)
LYMPHOCYTES RELATIVE PERCENT: 25.3 % (ref 20–42)
MAGNESIUM: 1.5 MG/DL (ref 1.6–2.6)
MAGNESIUM: 2.3 MG/DL (ref 1.6–2.6)
MCH RBC QN AUTO: 30.5 PG (ref 26–35)
MCHC RBC AUTO-ENTMCNC: 31.5 % (ref 32–34.5)
MCV RBC AUTO: 97.1 FL (ref 80–99.9)
METER GLUCOSE: 109 MG/DL (ref 70–110)
METER GLUCOSE: 112 MG/DL (ref 70–110)
METER GLUCOSE: 149 MG/DL (ref 70–110)
METER GLUCOSE: 158 MG/DL (ref 70–110)
METER GLUCOSE: 163 MG/DL (ref 70–110)
METER GLUCOSE: 185 MG/DL (ref 70–110)
METER GLUCOSE: 219 MG/DL (ref 70–110)
METER GLUCOSE: 243 MG/DL (ref 70–110)
METER GLUCOSE: 488 MG/DL (ref 70–110)
METER GLUCOSE: 78 MG/DL (ref 70–110)
METER GLUCOSE: 90 MG/DL (ref 70–110)
METER GLUCOSE: 94 MG/DL (ref 70–110)
METHADONE SCREEN, URINE: NOT DETECTED
MONOCYTES ABSOLUTE: 0.36 E9/L (ref 0.1–0.95)
MONOCYTES RELATIVE PERCENT: 6.3 % (ref 2–12)
NEUTROPHILS ABSOLUTE: 3.62 E9/L (ref 1.8–7.3)
NEUTROPHILS RELATIVE PERCENT: 63.1 % (ref 43–80)
NITRITE, URINE: POSITIVE
OPIATE SCREEN URINE: NOT DETECTED
OSMOLALITY: 329 MOSM/KG (ref 285–310)
PDW BLD-RTO: 12.9 FL (ref 11.5–15)
PH UA: 6 (ref 5–9)
PH VENOUS: 7.28 (ref 7.3–7.42)
PHENCYCLIDINE SCREEN URINE: NOT DETECTED
PHOSPHORUS: 2.1 MG/DL (ref 2.5–4.5)
PHOSPHORUS: 3.2 MG/DL (ref 2.5–4.5)
PLATELET # BLD: 426 E9/L (ref 130–450)
PMV BLD AUTO: 10 FL (ref 7–12)
POC ANION GAP: 22
POC BUN: 21
POC CHLORIDE: 94
POC CO2: 21
POC CREATININE: 1
POC POTASSIUM: 5
POC SODIUM: 131
POTASSIUM SERPL-SCNC: 3.8 MMOL/L (ref 3.5–5)
POTASSIUM SERPL-SCNC: 4.1 MMOL/L (ref 3.5–5)
POTASSIUM SERPL-SCNC: 4.9 MMOL/L (ref 3.5–5)
PREGNANCY TEST URINE, POC: NEGATIVE
PROPOXYPHENE SCREEN: NOT DETECTED
PROTEIN UA: NEGATIVE MG/DL
RBC # BLD: 3.83 E12/L (ref 3.5–5.5)
RBC UA: ABNORMAL /HPF (ref 0–2)
SALICYLATE, SERUM: <0.3 MG/DL (ref 0–30)
SODIUM BLD-SCNC: 130 MMOL/L (ref 132–146)
SODIUM BLD-SCNC: 138 MMOL/L (ref 132–146)
SODIUM BLD-SCNC: 139 MMOL/L (ref 132–146)
SPECIFIC GRAVITY UA: <=1.005 (ref 1–1.03)
TOTAL PROTEIN: 7.2 G/DL (ref 6.4–8.3)
TRICYCLIC ANTIDEPRESSANTS SCREEN SERUM: NEGATIVE NG/ML
TROPONIN: <0.01 NG/ML (ref 0–0.03)
UROBILINOGEN, URINE: 0.2 E.U./DL
WBC # BLD: 5.7 E9/L (ref 4.5–11.5)
WBC UA: >20 /HPF (ref 0–5)

## 2018-04-15 PROCEDURE — 84484 ASSAY OF TROPONIN QUANT: CPT

## 2018-04-15 PROCEDURE — 83036 HEMOGLOBIN GLYCOSYLATED A1C: CPT

## 2018-04-15 PROCEDURE — 6360000002 HC RX W HCPCS: Performed by: NURSE PRACTITIONER

## 2018-04-15 PROCEDURE — 87088 URINE BACTERIA CULTURE: CPT

## 2018-04-15 PROCEDURE — 85025 COMPLETE CBC W/AUTO DIFF WBC: CPT

## 2018-04-15 PROCEDURE — 83690 ASSAY OF LIPASE: CPT

## 2018-04-15 PROCEDURE — 87040 BLOOD CULTURE FOR BACTERIA: CPT

## 2018-04-15 PROCEDURE — 2580000003 HC RX 258: Performed by: NURSE PRACTITIONER

## 2018-04-15 PROCEDURE — 83930 ASSAY OF BLOOD OSMOLALITY: CPT

## 2018-04-15 PROCEDURE — 87077 CULTURE AEROBIC IDENTIFY: CPT

## 2018-04-15 PROCEDURE — 6370000000 HC RX 637 (ALT 250 FOR IP): Performed by: NURSE PRACTITIONER

## 2018-04-15 PROCEDURE — 93005 ELECTROCARDIOGRAM TRACING: CPT | Performed by: NURSE PRACTITIONER

## 2018-04-15 PROCEDURE — 82010 KETONE BODYS QUAN: CPT

## 2018-04-15 PROCEDURE — 82962 GLUCOSE BLOOD TEST: CPT

## 2018-04-15 PROCEDURE — 6360000002 HC RX W HCPCS: Performed by: HOSPITALIST

## 2018-04-15 PROCEDURE — 82800 BLOOD PH: CPT

## 2018-04-15 PROCEDURE — 2000000000 HC ICU R&B

## 2018-04-15 PROCEDURE — 36415 COLL VENOUS BLD VENIPUNCTURE: CPT

## 2018-04-15 PROCEDURE — 71045 X-RAY EXAM CHEST 1 VIEW: CPT

## 2018-04-15 PROCEDURE — 6370000000 HC RX 637 (ALT 250 FOR IP): Performed by: HOSPITALIST

## 2018-04-15 PROCEDURE — 2500000003 HC RX 250 WO HCPCS: Performed by: HOSPITALIST

## 2018-04-15 PROCEDURE — 87186 SC STD MICRODIL/AGAR DIL: CPT

## 2018-04-15 PROCEDURE — 87081 CULTURE SCREEN ONLY: CPT

## 2018-04-15 PROCEDURE — 80053 COMPREHEN METABOLIC PANEL: CPT

## 2018-04-15 PROCEDURE — 83735 ASSAY OF MAGNESIUM: CPT

## 2018-04-15 PROCEDURE — 80307 DRUG TEST PRSMV CHEM ANLYZR: CPT

## 2018-04-15 PROCEDURE — 80048 BASIC METABOLIC PNL TOTAL CA: CPT

## 2018-04-15 PROCEDURE — 84100 ASSAY OF PHOSPHORUS: CPT

## 2018-04-15 PROCEDURE — 83605 ASSAY OF LACTIC ACID: CPT

## 2018-04-15 PROCEDURE — 02HV33Z INSERTION OF INFUSION DEVICE INTO SUPERIOR VENA CAVA, PERCUTANEOUS APPROACH: ICD-10-PCS | Performed by: EMERGENCY MEDICINE

## 2018-04-15 PROCEDURE — 81001 URINALYSIS AUTO W/SCOPE: CPT

## 2018-04-15 PROCEDURE — 82947 ASSAY GLUCOSE BLOOD QUANT: CPT

## 2018-04-15 PROCEDURE — 2580000003 HC RX 258: Performed by: HOSPITALIST

## 2018-04-15 PROCEDURE — 6360000002 HC RX W HCPCS: Performed by: EMERGENCY MEDICINE

## 2018-04-15 PROCEDURE — 99285 EMERGENCY DEPT VISIT HI MDM: CPT

## 2018-04-15 PROCEDURE — 36556 INSERT NON-TUNNEL CV CATH: CPT

## 2018-04-15 RX ORDER — 0.9 % SODIUM CHLORIDE 0.9 %
3000 INTRAVENOUS SOLUTION INTRAVENOUS ONCE
Status: COMPLETED | OUTPATIENT
Start: 2018-04-15 | End: 2018-04-15

## 2018-04-15 RX ORDER — DEXTROSE MONOHYDRATE 25 G/50ML
12.5 INJECTION, SOLUTION INTRAVENOUS PRN
Status: DISCONTINUED | OUTPATIENT
Start: 2018-04-15 | End: 2018-04-17 | Stop reason: HOSPADM

## 2018-04-15 RX ORDER — INSULIN GLARGINE 100 [IU]/ML
25 INJECTION, SOLUTION SUBCUTANEOUS NIGHTLY
Status: DISCONTINUED | OUTPATIENT
Start: 2018-04-15 | End: 2018-04-17 | Stop reason: HOSPADM

## 2018-04-15 RX ORDER — NICOTINE POLACRILEX 4 MG
15 LOZENGE BUCCAL PRN
Status: DISCONTINUED | OUTPATIENT
Start: 2018-04-15 | End: 2018-04-17 | Stop reason: HOSPADM

## 2018-04-15 RX ORDER — HYDROCODONE BITARTRATE AND ACETAMINOPHEN 5; 325 MG/1; MG/1
1 TABLET ORAL EVERY 6 HOURS PRN
Status: DISCONTINUED | OUTPATIENT
Start: 2018-04-15 | End: 2018-04-17 | Stop reason: HOSPADM

## 2018-04-15 RX ORDER — SODIUM CHLORIDE 9 MG/ML
INJECTION, SOLUTION INTRAVENOUS CONTINUOUS
Status: DISCONTINUED | OUTPATIENT
Start: 2018-04-15 | End: 2018-04-17 | Stop reason: HOSPADM

## 2018-04-15 RX ORDER — DEXTROSE AND SODIUM CHLORIDE 5; .45 G/100ML; G/100ML
INJECTION, SOLUTION INTRAVENOUS CONTINUOUS PRN
Status: DISCONTINUED | OUTPATIENT
Start: 2018-04-15 | End: 2018-04-17 | Stop reason: HOSPADM

## 2018-04-15 RX ORDER — DEXTROSE MONOHYDRATE 50 MG/ML
100 INJECTION, SOLUTION INTRAVENOUS PRN
Status: DISCONTINUED | OUTPATIENT
Start: 2018-04-15 | End: 2018-04-17 | Stop reason: HOSPADM

## 2018-04-15 RX ORDER — ONDANSETRON 2 MG/ML
4 INJECTION INTRAMUSCULAR; INTRAVENOUS ONCE
Status: COMPLETED | OUTPATIENT
Start: 2018-04-15 | End: 2018-04-15

## 2018-04-15 RX ORDER — 0.9 % SODIUM CHLORIDE 0.9 %
15 INTRAVENOUS SOLUTION INTRAVENOUS ONCE
Status: COMPLETED | OUTPATIENT
Start: 2018-04-15 | End: 2018-04-15

## 2018-04-15 RX ORDER — POTASSIUM CHLORIDE 7.45 MG/ML
10 INJECTION INTRAVENOUS PRN
Status: DISCONTINUED | OUTPATIENT
Start: 2018-04-15 | End: 2018-04-17 | Stop reason: HOSPADM

## 2018-04-15 RX ORDER — INSULIN GLARGINE 100 [IU]/ML
10 INJECTION, SOLUTION SUBCUTANEOUS ONCE
Status: COMPLETED | OUTPATIENT
Start: 2018-04-15 | End: 2018-04-15

## 2018-04-15 RX ORDER — HYDROMORPHONE HCL 110MG/55ML
1 PATIENT CONTROLLED ANALGESIA SYRINGE INTRAVENOUS ONCE
Status: COMPLETED | OUTPATIENT
Start: 2018-04-15 | End: 2018-04-15

## 2018-04-15 RX ORDER — HYDROCODONE BITARTRATE AND ACETAMINOPHEN 5; 325 MG/1; MG/1
2 TABLET ORAL EVERY 6 HOURS PRN
Status: DISCONTINUED | OUTPATIENT
Start: 2018-04-15 | End: 2018-04-17 | Stop reason: HOSPADM

## 2018-04-15 RX ORDER — FERROUS SULFATE 325(65) MG
325 TABLET ORAL
COMMUNITY
End: 2018-06-25

## 2018-04-15 RX ORDER — MAGNESIUM SULFATE IN WATER 40 MG/ML
2 INJECTION, SOLUTION INTRAVENOUS ONCE
Status: COMPLETED | OUTPATIENT
Start: 2018-04-15 | End: 2018-04-15

## 2018-04-15 RX ORDER — MAGNESIUM SULFATE 1 G/100ML
1 INJECTION INTRAVENOUS PRN
Status: DISCONTINUED | OUTPATIENT
Start: 2018-04-15 | End: 2018-04-17 | Stop reason: HOSPADM

## 2018-04-15 RX ADMIN — INSULIN HUMAN 10 UNITS: 100 INJECTION, SOLUTION PARENTERAL at 04:29

## 2018-04-15 RX ADMIN — WATER 2 G: 1 INJECTION INTRAMUSCULAR; INTRAVENOUS; SUBCUTANEOUS at 05:17

## 2018-04-15 RX ADMIN — SODIUM CHLORIDE: 9 INJECTION, SOLUTION INTRAVENOUS at 07:02

## 2018-04-15 RX ADMIN — HYDROCODONE BITARTRATE AND ACETAMINOPHEN 2 TABLET: 5; 325 TABLET ORAL at 23:26

## 2018-04-15 RX ADMIN — SODIUM CHLORIDE 974 ML: 9 INJECTION, SOLUTION INTRAVENOUS at 07:02

## 2018-04-15 RX ADMIN — HYDROCODONE BITARTRATE AND ACETAMINOPHEN 2 TABLET: 5; 325 TABLET ORAL at 10:58

## 2018-04-15 RX ADMIN — HYDROMORPHONE HYDROCHLORIDE 1 MG: 2 INJECTION INTRAMUSCULAR; INTRAVENOUS; SUBCUTANEOUS at 05:28

## 2018-04-15 RX ADMIN — SODIUM CHLORIDE 0.08 UNITS/KG/HR: 9 INJECTION, SOLUTION INTRAVENOUS at 05:54

## 2018-04-15 RX ADMIN — HYDROCODONE BITARTRATE AND ACETAMINOPHEN 2 TABLET: 5; 325 TABLET ORAL at 17:03

## 2018-04-15 RX ADMIN — CEFTRIAXONE 1 G: 1 INJECTION, POWDER, FOR SOLUTION INTRAMUSCULAR; INTRAVENOUS at 09:21

## 2018-04-15 RX ADMIN — MAGNESIUM SULFATE HEPTAHYDRATE 2 G: 40 INJECTION, SOLUTION INTRAVENOUS at 10:40

## 2018-04-15 RX ADMIN — SODIUM PHOSPHATE, MONOBASIC, MONOHYDRATE 15 MMOL: 276; 142 INJECTION, SOLUTION INTRAVENOUS at 10:40

## 2018-04-15 RX ADMIN — DEXTROSE AND SODIUM CHLORIDE: 5; 450 INJECTION, SOLUTION INTRAVENOUS at 08:10

## 2018-04-15 RX ADMIN — ENOXAPARIN SODIUM 40 MG: 40 INJECTION SUBCUTANEOUS at 08:09

## 2018-04-15 RX ADMIN — ONDANSETRON 4 MG: 2 INJECTION INTRAMUSCULAR; INTRAVENOUS at 14:32

## 2018-04-15 RX ADMIN — SODIUM CHLORIDE 3000 ML: 9 INJECTION, SOLUTION INTRAVENOUS at 04:36

## 2018-04-15 RX ADMIN — INSULIN LISPRO 4 UNITS: 100 INJECTION, SOLUTION INTRAVENOUS; SUBCUTANEOUS at 17:05

## 2018-04-15 RX ADMIN — INSULIN GLARGINE 10 UNITS: 100 INJECTION, SOLUTION SUBCUTANEOUS at 12:07

## 2018-04-15 RX ADMIN — ONDANSETRON 4 MG: 2 INJECTION INTRAMUSCULAR; INTRAVENOUS at 04:32

## 2018-04-15 RX ADMIN — INSULIN LISPRO 5 UNITS: 100 INJECTION, SOLUTION INTRAVENOUS; SUBCUTANEOUS at 17:10

## 2018-04-15 ASSESSMENT — PAIN SCALES - GENERAL
PAINLEVEL_OUTOF10: 4
PAINLEVEL_OUTOF10: 9
PAINLEVEL_OUTOF10: 7
PAINLEVEL_OUTOF10: 8
PAINLEVEL_OUTOF10: 8
PAINLEVEL_OUTOF10: 0
PAINLEVEL_OUTOF10: 2
PAINLEVEL_OUTOF10: 8

## 2018-04-15 ASSESSMENT — PAIN DESCRIPTION - PAIN TYPE
TYPE: ACUTE PAIN

## 2018-04-15 ASSESSMENT — PAIN DESCRIPTION - DESCRIPTORS
DESCRIPTORS: ACHING
DESCRIPTORS: ACHING
DESCRIPTORS: ACHING;DISCOMFORT;DULL
DESCRIPTORS: ACHING
DESCRIPTORS: ACHING

## 2018-04-15 ASSESSMENT — PAIN DESCRIPTION - LOCATION
LOCATION: NECK
LOCATION: NECK;ABDOMEN
LOCATION: GENERALIZED
LOCATION: ABDOMEN
LOCATION: ABDOMEN

## 2018-04-15 ASSESSMENT — PAIN DESCRIPTION - ORIENTATION
ORIENTATION: RIGHT
ORIENTATION: LOWER

## 2018-04-15 ASSESSMENT — PAIN DESCRIPTION - FREQUENCY: FREQUENCY: CONTINUOUS

## 2018-04-15 ASSESSMENT — PAIN DESCRIPTION - ONSET
ONSET: ON-GOING
ONSET: ON-GOING

## 2018-04-16 ENCOUNTER — APPOINTMENT (OUTPATIENT)
Dept: INTERVENTIONAL RADIOLOGY/VASCULAR | Age: 28
DRG: 720 | End: 2018-04-16
Payer: COMMERCIAL

## 2018-04-16 LAB
ANION GAP SERPL CALCULATED.3IONS-SCNC: 6 MMOL/L (ref 7–16)
BASOPHILS ABSOLUTE: 0.07 E9/L (ref 0–0.2)
BASOPHILS RELATIVE PERCENT: 1.2 % (ref 0–2)
BUN BLDV-MCNC: 10 MG/DL (ref 6–20)
CALCIUM SERPL-MCNC: 7.8 MG/DL (ref 8.6–10.2)
CHLORIDE BLD-SCNC: 106 MMOL/L (ref 98–107)
CO2: 26 MMOL/L (ref 22–29)
CREAT SERPL-MCNC: 0.8 MG/DL (ref 0.5–1)
EOSINOPHILS ABSOLUTE: 0.27 E9/L (ref 0.05–0.5)
EOSINOPHILS RELATIVE PERCENT: 4.5 % (ref 0–6)
GFR AFRICAN AMERICAN: >60
GFR NON-AFRICAN AMERICAN: >60 ML/MIN/1.73
GLUCOSE BLD-MCNC: 57 MG/DL (ref 74–109)
HCT VFR BLD CALC: 27.2 % (ref 34–48)
HEMOGLOBIN: 8.8 G/DL (ref 11.5–15.5)
IMMATURE GRANULOCYTES #: 0.02 E9/L
IMMATURE GRANULOCYTES %: 0.3 % (ref 0–5)
INR BLD: 0.9
LYMPHOCYTES ABSOLUTE: 3.01 E9/L (ref 1.5–4)
LYMPHOCYTES RELATIVE PERCENT: 50.7 % (ref 20–42)
MCH RBC QN AUTO: 30.6 PG (ref 26–35)
MCHC RBC AUTO-ENTMCNC: 32.4 % (ref 32–34.5)
MCV RBC AUTO: 94.4 FL (ref 80–99.9)
METER GLUCOSE: 116 MG/DL (ref 70–110)
METER GLUCOSE: 243 MG/DL (ref 70–110)
METER GLUCOSE: 357 MG/DL (ref 70–110)
METER GLUCOSE: 68 MG/DL (ref 70–110)
MONOCYTES ABSOLUTE: 0.33 E9/L (ref 0.1–0.95)
MONOCYTES RELATIVE PERCENT: 5.6 % (ref 2–12)
NEUTROPHILS ABSOLUTE: 2.24 E9/L (ref 1.8–7.3)
NEUTROPHILS RELATIVE PERCENT: 37.7 % (ref 43–80)
PDW BLD-RTO: 12.6 FL (ref 11.5–15)
PLATELET # BLD: 322 E9/L (ref 130–450)
PMV BLD AUTO: 9.5 FL (ref 7–12)
POTASSIUM REFLEX MAGNESIUM: 3.9 MMOL/L (ref 3.5–5)
PROTHROMBIN TIME: 10.4 SEC (ref 9.3–12.4)
RBC # BLD: 2.88 E12/L (ref 3.5–5.5)
SODIUM BLD-SCNC: 138 MMOL/L (ref 132–146)
WBC # BLD: 5.9 E9/L (ref 4.5–11.5)

## 2018-04-16 PROCEDURE — 6370000000 HC RX 637 (ALT 250 FOR IP): Performed by: NURSE PRACTITIONER

## 2018-04-16 PROCEDURE — 36592 COLLECT BLOOD FROM PICC: CPT

## 2018-04-16 PROCEDURE — 6360000002 HC RX W HCPCS: Performed by: RADIOLOGY

## 2018-04-16 PROCEDURE — C1788 PORT, INDWELLING, IMP: HCPCS

## 2018-04-16 PROCEDURE — 1200000000 HC SEMI PRIVATE

## 2018-04-16 PROCEDURE — 36561 INSERT TUNNELED CV CATH: CPT | Performed by: RADIOLOGY

## 2018-04-16 PROCEDURE — 77001 FLUOROGUIDE FOR VEIN DEVICE: CPT | Performed by: RADIOLOGY

## 2018-04-16 PROCEDURE — 2580000003 HC RX 258: Performed by: INTERNAL MEDICINE

## 2018-04-16 PROCEDURE — 6370000000 HC RX 637 (ALT 250 FOR IP): Performed by: HOSPITALIST

## 2018-04-16 PROCEDURE — 76937 US GUIDE VASCULAR ACCESS: CPT | Performed by: RADIOLOGY

## 2018-04-16 PROCEDURE — 36592 COLLECT BLOOD FROM PICC: CPT | Performed by: NURSE PRACTITIONER

## 2018-04-16 PROCEDURE — 85025 COMPLETE CBC W/AUTO DIFF WBC: CPT

## 2018-04-16 PROCEDURE — 6360000002 HC RX W HCPCS: Performed by: INTERNAL MEDICINE

## 2018-04-16 PROCEDURE — 82962 GLUCOSE BLOOD TEST: CPT

## 2018-04-16 PROCEDURE — 2580000003 HC RX 258: Performed by: RADIOLOGY

## 2018-04-16 PROCEDURE — 80048 BASIC METABOLIC PNL TOTAL CA: CPT

## 2018-04-16 PROCEDURE — 85610 PROTHROMBIN TIME: CPT

## 2018-04-16 PROCEDURE — 36415 COLL VENOUS BLD VENIPUNCTURE: CPT

## 2018-04-16 RX ORDER — FENTANYL CITRATE 50 UG/ML
25 INJECTION, SOLUTION INTRAMUSCULAR; INTRAVENOUS ONCE
Status: COMPLETED | OUTPATIENT
Start: 2018-04-16 | End: 2018-04-16

## 2018-04-16 RX ORDER — MIDAZOLAM HYDROCHLORIDE 1 MG/ML
0.5 INJECTION INTRAMUSCULAR; INTRAVENOUS ONCE
Status: COMPLETED | OUTPATIENT
Start: 2018-04-16 | End: 2018-04-16

## 2018-04-16 RX ADMIN — HYDROCODONE BITARTRATE AND ACETAMINOPHEN 2 TABLET: 5; 325 TABLET ORAL at 10:13

## 2018-04-16 RX ADMIN — FENTANYL CITRATE 25 MCG: 50 INJECTION INTRAMUSCULAR; INTRAVENOUS at 15:30

## 2018-04-16 RX ADMIN — HYDROCODONE BITARTRATE AND ACETAMINOPHEN 2 TABLET: 5; 325 TABLET ORAL at 18:10

## 2018-04-16 RX ADMIN — INSULIN LISPRO 10 UNITS: 100 INJECTION, SOLUTION INTRAVENOUS; SUBCUTANEOUS at 17:48

## 2018-04-16 RX ADMIN — INSULIN GLARGINE 25 UNITS: 100 INJECTION, SOLUTION SUBCUTANEOUS at 21:47

## 2018-04-16 RX ADMIN — INSULIN LISPRO 5 UNITS: 100 INJECTION, SOLUTION INTRAVENOUS; SUBCUTANEOUS at 17:48

## 2018-04-16 RX ADMIN — WATER 1 G: 1 INJECTION INTRAMUSCULAR; INTRAVENOUS; SUBCUTANEOUS at 14:55

## 2018-04-16 RX ADMIN — CEFTRIAXONE 1 G: 1 INJECTION, POWDER, FOR SOLUTION INTRAMUSCULAR; INTRAVENOUS at 21:46

## 2018-04-16 RX ADMIN — INSULIN LISPRO 6 UNITS: 100 INJECTION, SOLUTION INTRAVENOUS; SUBCUTANEOUS at 02:23

## 2018-04-16 RX ADMIN — MIDAZOLAM 0.5 MG: 1 INJECTION INTRAMUSCULAR; INTRAVENOUS at 15:30

## 2018-04-16 ASSESSMENT — PAIN SCALES - GENERAL
PAINLEVEL_OUTOF10: 0
PAINLEVEL_OUTOF10: 8
PAINLEVEL_OUTOF10: 8
PAINLEVEL_OUTOF10: 0
PAINLEVEL_OUTOF10: 7
PAINLEVEL_OUTOF10: 1

## 2018-04-16 ASSESSMENT — PAIN DESCRIPTION - FREQUENCY
FREQUENCY: CONTINUOUS

## 2018-04-16 ASSESSMENT — PAIN DESCRIPTION - ORIENTATION
ORIENTATION: LEFT;OUTER;UPPER
ORIENTATION: RIGHT;LEFT;ANTERIOR;POSTERIOR

## 2018-04-16 ASSESSMENT — PAIN DESCRIPTION - PAIN TYPE
TYPE: ACUTE PAIN

## 2018-04-16 ASSESSMENT — PAIN DESCRIPTION - PROGRESSION: CLINICAL_PROGRESSION: NOT CHANGED

## 2018-04-16 ASSESSMENT — PAIN DESCRIPTION - ONSET
ONSET: ON-GOING
ONSET: OTHER (COMMENT)
ONSET: ON-GOING

## 2018-04-16 ASSESSMENT — PAIN DESCRIPTION - DESCRIPTORS
DESCRIPTORS: DISCOMFORT;SORE;ACHING
DESCRIPTORS: ACHING;DISCOMFORT;DULL
DESCRIPTORS: DISCOMFORT;SORE

## 2018-04-16 ASSESSMENT — PAIN DESCRIPTION - LOCATION
LOCATION: NECK
LOCATION: CHEST
LOCATION: NECK;ABDOMEN

## 2018-04-17 VITALS
RESPIRATION RATE: 16 BRPM | BODY MASS INDEX: 25.78 KG/M2 | HEART RATE: 85 BPM | SYSTOLIC BLOOD PRESSURE: 128 MMHG | WEIGHT: 151 LBS | TEMPERATURE: 97.9 F | DIASTOLIC BLOOD PRESSURE: 82 MMHG | HEIGHT: 64 IN | OXYGEN SATURATION: 97 %

## 2018-04-17 LAB
ANION GAP SERPL CALCULATED.3IONS-SCNC: 7 MMOL/L (ref 7–16)
BASOPHILS ABSOLUTE: 0.06 E9/L (ref 0–0.2)
BASOPHILS RELATIVE PERCENT: 1 % (ref 0–2)
BUN BLDV-MCNC: 14 MG/DL (ref 6–20)
CALCIUM SERPL-MCNC: 8.1 MG/DL (ref 8.6–10.2)
CHLORIDE BLD-SCNC: 106 MMOL/L (ref 98–107)
CO2: 26 MMOL/L (ref 22–29)
CREAT SERPL-MCNC: 0.8 MG/DL (ref 0.5–1)
EOSINOPHILS ABSOLUTE: 0.28 E9/L (ref 0.05–0.5)
EOSINOPHILS RELATIVE PERCENT: 4.5 % (ref 0–6)
GFR AFRICAN AMERICAN: >60
GFR NON-AFRICAN AMERICAN: >60 ML/MIN/1.73
GLUCOSE BLD-MCNC: 93 MG/DL (ref 74–109)
HCT VFR BLD CALC: 26 % (ref 34–48)
HEMOGLOBIN: 8.4 G/DL (ref 11.5–15.5)
IMMATURE GRANULOCYTES #: 0.01 E9/L
IMMATURE GRANULOCYTES %: 0.2 % (ref 0–5)
LYMPHOCYTES ABSOLUTE: 2.57 E9/L (ref 1.5–4)
LYMPHOCYTES RELATIVE PERCENT: 41.2 % (ref 20–42)
MCH RBC QN AUTO: 30.4 PG (ref 26–35)
MCHC RBC AUTO-ENTMCNC: 32.3 % (ref 32–34.5)
MCV RBC AUTO: 94.2 FL (ref 80–99.9)
METER GLUCOSE: 152 MG/DL (ref 70–110)
METER GLUCOSE: 41 MG/DL (ref 70–110)
METER GLUCOSE: 82 MG/DL (ref 70–110)
METER GLUCOSE: 90 MG/DL (ref 70–110)
MONOCYTES ABSOLUTE: 0.36 E9/L (ref 0.1–0.95)
MONOCYTES RELATIVE PERCENT: 5.8 % (ref 2–12)
MRSA CULTURE ONLY: NORMAL
NEUTROPHILS ABSOLUTE: 2.96 E9/L (ref 1.8–7.3)
NEUTROPHILS RELATIVE PERCENT: 47.3 % (ref 43–80)
ORGANISM: ABNORMAL
PDW BLD-RTO: 12.7 FL (ref 11.5–15)
PLATELET # BLD: 306 E9/L (ref 130–450)
PMV BLD AUTO: 9.7 FL (ref 7–12)
POTASSIUM REFLEX MAGNESIUM: 4 MMOL/L (ref 3.5–5)
RBC # BLD: 2.76 E12/L (ref 3.5–5.5)
SODIUM BLD-SCNC: 139 MMOL/L (ref 132–146)
URINE CULTURE, ROUTINE: ABNORMAL
URINE CULTURE, ROUTINE: ABNORMAL
WBC # BLD: 6.2 E9/L (ref 4.5–11.5)

## 2018-04-17 PROCEDURE — 6370000000 HC RX 637 (ALT 250 FOR IP): Performed by: HOSPITALIST

## 2018-04-17 PROCEDURE — 2580000003 HC RX 258

## 2018-04-17 PROCEDURE — 80048 BASIC METABOLIC PNL TOTAL CA: CPT

## 2018-04-17 PROCEDURE — 6360000002 HC RX W HCPCS: Performed by: PHYSICIAN ASSISTANT

## 2018-04-17 PROCEDURE — 82962 GLUCOSE BLOOD TEST: CPT

## 2018-04-17 PROCEDURE — 36415 COLL VENOUS BLD VENIPUNCTURE: CPT

## 2018-04-17 PROCEDURE — 36592 COLLECT BLOOD FROM PICC: CPT

## 2018-04-17 PROCEDURE — 85025 COMPLETE CBC W/AUTO DIFF WBC: CPT

## 2018-04-17 PROCEDURE — 6360000002 HC RX W HCPCS: Performed by: NURSE PRACTITIONER

## 2018-04-17 RX ORDER — SODIUM CHLORIDE 0.9 % (FLUSH) 0.9 %
SYRINGE (ML) INJECTION
Status: COMPLETED
Start: 2018-04-17 | End: 2018-04-17

## 2018-04-17 RX ORDER — OXYCODONE HYDROCHLORIDE AND ACETAMINOPHEN 5; 325 MG/1; MG/1
1 TABLET ORAL EVERY 6 HOURS PRN
Qty: 12 TABLET | Refills: 0 | Status: SHIPPED | OUTPATIENT
Start: 2018-04-17 | End: 2018-04-20

## 2018-04-17 RX ORDER — KETOROLAC TROMETHAMINE 30 MG/ML
30 INJECTION, SOLUTION INTRAMUSCULAR; INTRAVENOUS EVERY 6 HOURS PRN
Status: DISCONTINUED | OUTPATIENT
Start: 2018-04-17 | End: 2018-04-17 | Stop reason: HOSPADM

## 2018-04-17 RX ORDER — CIPROFLOXACIN 250 MG/1
250 TABLET, FILM COATED ORAL 2 TIMES DAILY
Qty: 10 TABLET | Refills: 0 | Status: SHIPPED | OUTPATIENT
Start: 2018-04-17 | End: 2018-04-22

## 2018-04-17 RX ADMIN — KETOROLAC TROMETHAMINE 30 MG: 30 INJECTION, SOLUTION INTRAMUSCULAR at 04:10

## 2018-04-17 RX ADMIN — INSULIN LISPRO 5 UNITS: 100 INJECTION, SOLUTION INTRAVENOUS; SUBCUTANEOUS at 12:07

## 2018-04-17 RX ADMIN — HYDROCODONE BITARTRATE AND ACETAMINOPHEN 2 TABLET: 5; 325 TABLET ORAL at 00:41

## 2018-04-17 RX ADMIN — Medication 10 ML: at 14:06

## 2018-04-17 RX ADMIN — HYDROCODONE BITARTRATE AND ACETAMINOPHEN 2 TABLET: 5; 325 TABLET ORAL at 13:05

## 2018-04-17 RX ADMIN — ENOXAPARIN SODIUM 40 MG: 40 INJECTION SUBCUTANEOUS at 08:52

## 2018-04-17 RX ADMIN — KETOROLAC TROMETHAMINE 30 MG: 30 INJECTION, SOLUTION INTRAMUSCULAR at 14:06

## 2018-04-17 RX ADMIN — INSULIN LISPRO 2 UNITS: 100 INJECTION, SOLUTION INTRAVENOUS; SUBCUTANEOUS at 12:04

## 2018-04-17 ASSESSMENT — PAIN SCALES - GENERAL
PAINLEVEL_OUTOF10: 8
PAINLEVEL_OUTOF10: 9
PAINLEVEL_OUTOF10: 8
PAINLEVEL_OUTOF10: 4
PAINLEVEL_OUTOF10: 8
PAINLEVEL_OUTOF10: 8

## 2018-04-17 ASSESSMENT — PAIN DESCRIPTION - FREQUENCY: FREQUENCY: CONTINUOUS

## 2018-04-17 ASSESSMENT — PAIN DESCRIPTION - DESCRIPTORS: DESCRIPTORS: BURNING;DISCOMFORT

## 2018-04-17 ASSESSMENT — PAIN DESCRIPTION - ORIENTATION: ORIENTATION: LEFT

## 2018-04-17 ASSESSMENT — PAIN DESCRIPTION - LOCATION: LOCATION: CHEST

## 2018-04-17 ASSESSMENT — PAIN DESCRIPTION - PAIN TYPE: TYPE: SURGICAL PAIN

## 2018-04-20 LAB — BLOOD CULTURE, ROUTINE: NORMAL

## 2018-04-21 LAB
EKG ATRIAL RATE: 118 BPM
EKG P AXIS: 49 DEGREES
EKG P-R INTERVAL: 118 MS
EKG Q-T INTERVAL: 320 MS
EKG QRS DURATION: 88 MS
EKG QTC CALCULATION (BAZETT): 448 MS
EKG R AXIS: 55 DEGREES
EKG T AXIS: -17 DEGREES
EKG VENTRICULAR RATE: 118 BPM

## 2018-04-30 ENCOUNTER — HOSPITAL ENCOUNTER (EMERGENCY)
Age: 28
Discharge: HOME | End: 2018-04-30
Attending: EMERGENCY MEDICINE
Payer: COMMERCIAL

## 2018-04-30 VITALS
WEIGHT: 146 LBS | OXYGEN SATURATION: 99 % | HEART RATE: 79 BPM | TEMPERATURE: 98.3 F | SYSTOLIC BLOOD PRESSURE: 105 MMHG | BODY MASS INDEX: 24.92 KG/M2 | RESPIRATION RATE: 16 BRPM | HEIGHT: 64 IN | DIASTOLIC BLOOD PRESSURE: 48 MMHG

## 2018-04-30 DIAGNOSIS — E86.0 DEHYDRATION: Primary | ICD-10-CM

## 2018-04-30 DIAGNOSIS — E16.2 HYPOGLYCEMIA: ICD-10-CM

## 2018-04-30 LAB
ANION GAP SERPL CALCULATED.3IONS-SCNC: 14 MMOL/L (ref 7–16)
BACTERIA: ABNORMAL /HPF
BASOPHILS ABSOLUTE: 0.11 E9/L (ref 0–0.2)
BASOPHILS RELATIVE PERCENT: 1.8 % (ref 0–2)
BILIRUBIN URINE: ABNORMAL
BLOOD, URINE: NEGATIVE
BUN BLDV-MCNC: 27 MG/DL (ref 6–20)
CALCIUM SERPL-MCNC: 9.2 MG/DL (ref 8.6–10.2)
CHLORIDE BLD-SCNC: 95 MMOL/L (ref 98–107)
CLARITY: CLEAR
CO2: 27 MMOL/L (ref 22–29)
COLOR: YELLOW
CREAT SERPL-MCNC: 1.2 MG/DL (ref 0.5–1)
EKG ATRIAL RATE: 93 BPM
EKG P AXIS: 54 DEGREES
EKG P-R INTERVAL: 112 MS
EKG Q-T INTERVAL: 358 MS
EKG QRS DURATION: 96 MS
EKG QTC CALCULATION (BAZETT): 445 MS
EKG R AXIS: 59 DEGREES
EKG T AXIS: 42 DEGREES
EKG VENTRICULAR RATE: 93 BPM
EOSINOPHILS ABSOLUTE: 0.26 E9/L (ref 0.05–0.5)
EOSINOPHILS RELATIVE PERCENT: 4.1 % (ref 0–6)
EPITHELIAL CELLS, UA: ABNORMAL /HPF
GFR AFRICAN AMERICAN: >60
GFR NON-AFRICAN AMERICAN: >60 ML/MIN/1.73
GLUCOSE BLD-MCNC: 66 MG/DL (ref 74–109)
GLUCOSE BLD-MCNC: 84 MG/DL
GLUCOSE URINE: >=1000 MG/DL
HCT VFR BLD CALC: 34.4 % (ref 34–48)
HEMOGLOBIN: 11.5 G/DL (ref 11.5–15.5)
IMMATURE GRANULOCYTES #: 0.03 E9/L
IMMATURE GRANULOCYTES %: 0.5 % (ref 0–5)
KETONES, URINE: 40 MG/DL
LACTIC ACID: 0.8 MMOL/L (ref 0.5–2.2)
LEUKOCYTE ESTERASE, URINE: NEGATIVE
LYMPHOCYTES ABSOLUTE: 2.28 E9/L (ref 1.5–4)
LYMPHOCYTES RELATIVE PERCENT: 36.4 % (ref 20–42)
MCH RBC QN AUTO: 31.1 PG (ref 26–35)
MCHC RBC AUTO-ENTMCNC: 33.4 % (ref 32–34.5)
MCV RBC AUTO: 93 FL (ref 80–99.9)
METER GLUCOSE: 84 MG/DL (ref 70–110)
MONOCYTES ABSOLUTE: 0.46 E9/L (ref 0.1–0.95)
MONOCYTES RELATIVE PERCENT: 7.3 % (ref 2–12)
NEUTROPHILS ABSOLUTE: 3.13 E9/L (ref 1.8–7.3)
NEUTROPHILS RELATIVE PERCENT: 49.9 % (ref 43–80)
NITRITE, URINE: NEGATIVE
PDW BLD-RTO: 13.7 FL (ref 11.5–15)
PH UA: 5.5 (ref 5–9)
PH VENOUS: 7.41 (ref 7.3–7.42)
PLATELET # BLD: 340 E9/L (ref 130–450)
PMV BLD AUTO: 9.7 FL (ref 7–12)
POTASSIUM REFLEX MAGNESIUM: 3.6 MMOL/L (ref 3.5–5)
PROTEIN UA: 30 MG/DL
RBC # BLD: 3.7 E12/L (ref 3.5–5.5)
RBC UA: ABNORMAL /HPF (ref 0–2)
SODIUM BLD-SCNC: 136 MMOL/L (ref 132–146)
SPECIFIC GRAVITY UA: 1.02 (ref 1–1.03)
UROBILINOGEN, URINE: 0.2 E.U./DL
WBC # BLD: 6.3 E9/L (ref 4.5–11.5)
WBC UA: ABNORMAL /HPF (ref 0–5)
YEAST: ABNORMAL

## 2018-04-30 PROCEDURE — 82962 GLUCOSE BLOOD TEST: CPT

## 2018-04-30 PROCEDURE — 2580000003 HC RX 258: Performed by: EMERGENCY MEDICINE

## 2018-04-30 PROCEDURE — 82800 BLOOD PH: CPT

## 2018-04-30 PROCEDURE — 82947 ASSAY GLUCOSE BLOOD QUANT: CPT

## 2018-04-30 PROCEDURE — 80048 BASIC METABOLIC PNL TOTAL CA: CPT

## 2018-04-30 PROCEDURE — 81001 URINALYSIS AUTO W/SCOPE: CPT

## 2018-04-30 PROCEDURE — 96374 THER/PROPH/DIAG INJ IV PUSH: CPT

## 2018-04-30 PROCEDURE — 6360000002 HC RX W HCPCS: Performed by: EMERGENCY MEDICINE

## 2018-04-30 PROCEDURE — 83605 ASSAY OF LACTIC ACID: CPT

## 2018-04-30 PROCEDURE — 96375 TX/PRO/DX INJ NEW DRUG ADDON: CPT

## 2018-04-30 PROCEDURE — 87088 URINE BACTERIA CULTURE: CPT

## 2018-04-30 PROCEDURE — 93005 ELECTROCARDIOGRAM TRACING: CPT | Performed by: EMERGENCY MEDICINE

## 2018-04-30 PROCEDURE — 96361 HYDRATE IV INFUSION ADD-ON: CPT

## 2018-04-30 PROCEDURE — 85025 COMPLETE CBC W/AUTO DIFF WBC: CPT

## 2018-04-30 PROCEDURE — 36415 COLL VENOUS BLD VENIPUNCTURE: CPT

## 2018-04-30 PROCEDURE — 6360000002 HC RX W HCPCS

## 2018-04-30 PROCEDURE — 99284 EMERGENCY DEPT VISIT MOD MDM: CPT

## 2018-04-30 RX ORDER — HEPARIN SODIUM (PORCINE) LOCK FLUSH IV SOLN 100 UNIT/ML 100 UNIT/ML
SOLUTION INTRAVENOUS
Status: COMPLETED
Start: 2018-04-30 | End: 2018-04-30

## 2018-04-30 RX ORDER — 0.9 % SODIUM CHLORIDE 0.9 %
1000 INTRAVENOUS SOLUTION INTRAVENOUS ONCE
Status: COMPLETED | OUTPATIENT
Start: 2018-04-30 | End: 2018-04-30

## 2018-04-30 RX ORDER — DEXTROSE MONOHYDRATE 25 G/50ML
12.5 INJECTION, SOLUTION INTRAVENOUS ONCE
Status: COMPLETED | OUTPATIENT
Start: 2018-04-30 | End: 2018-04-30

## 2018-04-30 RX ADMIN — Medication: at 15:06

## 2018-04-30 RX ADMIN — DEXTROSE MONOHYDRATE 12.5 G: 500 INJECTION PARENTERAL at 12:38

## 2018-04-30 RX ADMIN — SODIUM CHLORIDE 1000 ML: 9 INJECTION, SOLUTION INTRAVENOUS at 11:06

## 2018-04-30 RX ADMIN — PROCHLORPERAZINE EDISYLATE 10 MG: 5 INJECTION INTRAMUSCULAR; INTRAVENOUS at 11:06

## 2018-04-30 ASSESSMENT — PAIN DESCRIPTION - DESCRIPTORS: DESCRIPTORS: ACHING

## 2018-05-01 LAB — URINE CULTURE, ROUTINE: NORMAL

## 2018-05-03 LAB
EKG ATRIAL RATE: 127 BPM
EKG P AXIS: 68 DEGREES
EKG P-R INTERVAL: 114 MS
EKG Q-T INTERVAL: 318 MS
EKG QRS DURATION: 86 MS
EKG QTC CALCULATION (BAZETT): 462 MS
EKG R AXIS: 63 DEGREES
EKG T AXIS: 14 DEGREES
EKG VENTRICULAR RATE: 127 BPM

## 2018-05-13 ENCOUNTER — HOSPITAL ENCOUNTER (INPATIENT)
Age: 28
LOS: 2 days | Discharge: HOME | DRG: 420 | End: 2018-05-15
Attending: EMERGENCY MEDICINE | Admitting: HOSPITALIST
Payer: COMMERCIAL

## 2018-05-13 DIAGNOSIS — E10.10 DIABETIC KETOACIDOSIS WITHOUT COMA ASSOCIATED WITH TYPE 1 DIABETES MELLITUS (HCC): Primary | ICD-10-CM

## 2018-05-13 LAB
ALBUMIN SERPL-MCNC: 3.9 G/DL (ref 3.5–5.2)
ALP BLD-CCNC: 161 U/L (ref 35–104)
ALT SERPL-CCNC: 10 U/L (ref 0–32)
ANION GAP SERPL CALCULATED.3IONS-SCNC: 25 MMOL/L (ref 7–16)
ANION GAP SERPL CALCULATED.3IONS-SCNC: 37 MMOL/L (ref 7–16)
ANION GAP SERPL CALCULATED.3IONS-SCNC: 41 MMOL/L (ref 7–16)
ANION GAP SERPL CALCULATED.3IONS-SCNC: 41 MMOL/L (ref 7–16)
AST SERPL-CCNC: 11 U/L (ref 0–31)
B.E.: -17.8 MMOL/L (ref -3–3)
BACTERIA: ABNORMAL /HPF
BASOPHILS ABSOLUTE: 0.17 E9/L (ref 0–0.2)
BASOPHILS RELATIVE PERCENT: 1.3 % (ref 0–2)
BETA-HYDROXYBUTYRATE: >4.5 MMOL/L (ref 0.02–0.27)
BILIRUB SERPL-MCNC: 0.4 MG/DL (ref 0–1.2)
BILIRUBIN URINE: NEGATIVE
BLOOD, URINE: ABNORMAL
BUN BLDV-MCNC: 39 MG/DL (ref 6–20)
BUN BLDV-MCNC: 48 MG/DL (ref 6–20)
BUN BLDV-MCNC: 49 MG/DL (ref 6–20)
BUN BLDV-MCNC: 49 MG/DL (ref 6–20)
CALCIUM SERPL-MCNC: 7.8 MG/DL (ref 8.6–10.2)
CALCIUM SERPL-MCNC: 8.8 MG/DL (ref 8.6–10.2)
CALCIUM SERPL-MCNC: 9.1 MG/DL (ref 8.6–10.2)
CALCIUM SERPL-MCNC: 9.8 MG/DL (ref 8.6–10.2)
CHLORIDE BLD-SCNC: 106 MMOL/L (ref 98–107)
CHLORIDE BLD-SCNC: 83 MMOL/L (ref 98–107)
CHLORIDE BLD-SCNC: 87 MMOL/L (ref 98–107)
CHLORIDE BLD-SCNC: 93 MMOL/L (ref 98–107)
CLARITY: CLEAR
CO2: 10 MMOL/L (ref 22–29)
CO2: 7 MMOL/L (ref 22–29)
CO2: 7 MMOL/L (ref 22–29)
CO2: 9 MMOL/L (ref 22–29)
COHB: 1.1 % (ref 0–1.5)
COLOR: YELLOW
COMMENT: ABNORMAL
CREAT SERPL-MCNC: 1.2 MG/DL (ref 0.5–1)
CREAT SERPL-MCNC: 1.5 MG/DL (ref 0.5–1)
CREAT SERPL-MCNC: 1.5 MG/DL (ref 0.5–1)
CREAT SERPL-MCNC: 1.6 MG/DL (ref 0.5–1)
CRITICAL: ABNORMAL
DATE ANALYZED: ABNORMAL
DATE OF COLLECTION: ABNORMAL
EOSINOPHILS ABSOLUTE: 0.08 E9/L (ref 0.05–0.5)
EOSINOPHILS RELATIVE PERCENT: 0.6 % (ref 0–6)
GFR AFRICAN AMERICAN: 47
GFR AFRICAN AMERICAN: 51
GFR AFRICAN AMERICAN: 51
GFR AFRICAN AMERICAN: >60
GFR NON-AFRICAN AMERICAN: 47 ML/MIN/1.73
GFR NON-AFRICAN AMERICAN: 51 ML/MIN/1.73
GFR NON-AFRICAN AMERICAN: 51 ML/MIN/1.73
GFR NON-AFRICAN AMERICAN: >60 ML/MIN/1.73
GLUCOSE BLD-MCNC: 346 MG/DL (ref 74–109)
GLUCOSE BLD-MCNC: 704 MG/DL (ref 74–109)
GLUCOSE BLD-MCNC: 891 MG/DL (ref 74–109)
GLUCOSE BLD-MCNC: 924 MG/DL (ref 74–109)
GLUCOSE BLD-MCNC: NORMAL MG/DL
GLUCOSE URINE: >=1000 MG/DL
HCO3: 7.3 MMOL/L (ref 22–26)
HCT VFR BLD CALC: 39.8 % (ref 34–48)
HEMOGLOBIN: 12.2 G/DL (ref 11.5–15.5)
HHB: 1.7 % (ref 0–5)
IMMATURE GRANULOCYTES #: 0.07 E9/L
IMMATURE GRANULOCYTES %: 0.5 % (ref 0–5)
KETONES, URINE: >=80 MG/DL
LAB: ABNORMAL
LACTIC ACID: 2 MMOL/L (ref 0.5–2.2)
LACTIC ACID: 2.5 MMOL/L (ref 0.5–2.2)
LACTIC ACID: 2.8 MMOL/L (ref 0.5–2.2)
LEUKOCYTE ESTERASE, URINE: NEGATIVE
LYMPHOCYTES ABSOLUTE: 1.26 E9/L (ref 1.5–4)
LYMPHOCYTES RELATIVE PERCENT: 9.7 % (ref 20–42)
Lab: 1538
MAGNESIUM: 2.1 MG/DL (ref 1.6–2.6)
MAGNESIUM: 2.5 MG/DL (ref 1.6–2.6)
MAGNESIUM: 2.6 MG/DL (ref 1.6–2.6)
MCH RBC QN AUTO: 30.8 PG (ref 26–35)
MCHC RBC AUTO-ENTMCNC: 30.7 % (ref 32–34.5)
MCV RBC AUTO: 100.5 FL (ref 80–99.9)
METER GLUCOSE: 295 MG/DL (ref 70–110)
METER GLUCOSE: 307 MG/DL (ref 70–110)
METER GLUCOSE: 330 MG/DL (ref 70–110)
METER GLUCOSE: 449 MG/DL (ref 70–110)
METER GLUCOSE: >500 MG/DL (ref 70–110)
METHB: 0.4 % (ref 0–1.5)
MODE: ABNORMAL
MONOCYTES ABSOLUTE: 0.24 E9/L (ref 0.1–0.95)
MONOCYTES RELATIVE PERCENT: 1.9 % (ref 2–12)
NEUTROPHILS ABSOLUTE: 11.12 E9/L (ref 1.8–7.3)
NEUTROPHILS RELATIVE PERCENT: 86 % (ref 43–80)
NITRITE, URINE: NEGATIVE
O2 CONTENT: 17.6 ML/DL
O2 SATURATION: 98.3 % (ref 92–98.5)
O2HB: 96.8 % (ref 94–97)
OPERATOR ID: 3
PATIENT TEMP: 37 C
PCO2: 17.6 MMHG (ref 35–45)
PDW BLD-RTO: 13.1 FL (ref 11.5–15)
PH BLOOD GAS: 7.24 (ref 7.35–7.45)
PH UA: 5.5 (ref 5–9)
PH VENOUS: 7.19 (ref 7.3–7.42)
PHOSPHORUS: 3.4 MG/DL (ref 2.5–4.5)
PHOSPHORUS: 5.6 MG/DL (ref 2.5–4.5)
PHOSPHORUS: 6.2 MG/DL (ref 2.5–4.5)
PLATELET # BLD: 326 E9/L (ref 130–450)
PMV BLD AUTO: 11.4 FL (ref 7–12)
PO2: 125.1 MMHG (ref 60–100)
POC ANION GAP: 29
POC BUN: 44
POC CHLORIDE: 97
POC CO2: 12
POC CREATININE: 1.6
POC POTASSIUM: NORMAL
POC SODIUM: 131
POTASSIUM SERPL-SCNC: 4.5 MMOL/L (ref 3.5–5)
POTASSIUM SERPL-SCNC: 4.9 MMOL/L (ref 3.5–5)
POTASSIUM SERPL-SCNC: 5 MMOL/L (ref 3.5–5)
POTASSIUM SERPL-SCNC: 5.3 MMOL/L (ref 3.5–5)
PREGNANCY TEST URINE, POC: NEGATIVE
PROTEIN UA: NEGATIVE MG/DL
RBC # BLD: 3.96 E12/L (ref 3.5–5.5)
RBC UA: ABNORMAL /HPF (ref 0–2)
SODIUM BLD-SCNC: 133 MMOL/L (ref 132–146)
SODIUM BLD-SCNC: 135 MMOL/L (ref 132–146)
SODIUM BLD-SCNC: 137 MMOL/L (ref 132–146)
SODIUM BLD-SCNC: 141 MMOL/L (ref 132–146)
SOURCE, BLOOD GAS: ABNORMAL
SPECIFIC GRAVITY UA: 1.01 (ref 1–1.03)
THB: 12.8 G/DL (ref 11.5–16.5)
TIME ANALYZED: 1545
TOTAL PROTEIN: 7.4 G/DL (ref 6.4–8.3)
UROBILINOGEN, URINE: 0.2 E.U./DL
WBC # BLD: 12.9 E9/L (ref 4.5–11.5)
WBC UA: ABNORMAL /HPF (ref 0–5)
YEAST: ABNORMAL

## 2018-05-13 PROCEDURE — 2580000003 HC RX 258: Performed by: STUDENT IN AN ORGANIZED HEALTH CARE EDUCATION/TRAINING PROGRAM

## 2018-05-13 PROCEDURE — 6360000002 HC RX W HCPCS: Performed by: STUDENT IN AN ORGANIZED HEALTH CARE EDUCATION/TRAINING PROGRAM

## 2018-05-13 PROCEDURE — 96365 THER/PROPH/DIAG IV INF INIT: CPT

## 2018-05-13 PROCEDURE — 83735 ASSAY OF MAGNESIUM: CPT

## 2018-05-13 PROCEDURE — 82800 BLOOD PH: CPT

## 2018-05-13 PROCEDURE — 87040 BLOOD CULTURE FOR BACTERIA: CPT

## 2018-05-13 PROCEDURE — 84100 ASSAY OF PHOSPHORUS: CPT

## 2018-05-13 PROCEDURE — 2580000003 HC RX 258: Performed by: HOSPITALIST

## 2018-05-13 PROCEDURE — 2580000003 HC RX 258: Performed by: EMERGENCY MEDICINE

## 2018-05-13 PROCEDURE — 82805 BLOOD GASES W/O2 SATURATION: CPT

## 2018-05-13 PROCEDURE — 6360000002 HC RX W HCPCS: Performed by: NURSE PRACTITIONER

## 2018-05-13 PROCEDURE — 82962 GLUCOSE BLOOD TEST: CPT

## 2018-05-13 PROCEDURE — 36415 COLL VENOUS BLD VENIPUNCTURE: CPT

## 2018-05-13 PROCEDURE — 81001 URINALYSIS AUTO W/SCOPE: CPT

## 2018-05-13 PROCEDURE — 85025 COMPLETE CBC W/AUTO DIFF WBC: CPT

## 2018-05-13 PROCEDURE — 6370000000 HC RX 637 (ALT 250 FOR IP): Performed by: STUDENT IN AN ORGANIZED HEALTH CARE EDUCATION/TRAINING PROGRAM

## 2018-05-13 PROCEDURE — 36592 COLLECT BLOOD FROM PICC: CPT

## 2018-05-13 PROCEDURE — 83605 ASSAY OF LACTIC ACID: CPT

## 2018-05-13 PROCEDURE — 87081 CULTURE SCREEN ONLY: CPT

## 2018-05-13 PROCEDURE — 80053 COMPREHEN METABOLIC PANEL: CPT

## 2018-05-13 PROCEDURE — 82010 KETONE BODYS QUAN: CPT

## 2018-05-13 PROCEDURE — 96375 TX/PRO/DX INJ NEW DRUG ADDON: CPT

## 2018-05-13 PROCEDURE — 99285 EMERGENCY DEPT VISIT HI MDM: CPT

## 2018-05-13 PROCEDURE — 80048 BASIC METABOLIC PNL TOTAL CA: CPT

## 2018-05-13 PROCEDURE — 2000000000 HC ICU R&B

## 2018-05-13 RX ORDER — DEXTROSE MONOHYDRATE 25 G/50ML
12.5 INJECTION, SOLUTION INTRAVENOUS PRN
Status: DISCONTINUED | OUTPATIENT
Start: 2018-05-13 | End: 2018-05-14 | Stop reason: SDUPTHER

## 2018-05-13 RX ORDER — DEXTROSE AND SODIUM CHLORIDE 5; .45 G/100ML; G/100ML
INJECTION, SOLUTION INTRAVENOUS CONTINUOUS PRN
Status: DISCONTINUED | OUTPATIENT
Start: 2018-05-13 | End: 2018-05-14

## 2018-05-13 RX ORDER — DEXTROSE MONOHYDRATE 25 G/50ML
12.5 INJECTION, SOLUTION INTRAVENOUS PRN
Status: DISCONTINUED | OUTPATIENT
Start: 2018-05-13 | End: 2018-05-14

## 2018-05-13 RX ORDER — SODIUM CHLORIDE 450 MG/100ML
INJECTION, SOLUTION INTRAVENOUS CONTINUOUS
Status: DISCONTINUED | OUTPATIENT
Start: 2018-05-13 | End: 2018-05-14

## 2018-05-13 RX ORDER — 0.9 % SODIUM CHLORIDE 0.9 %
30 INTRAVENOUS SOLUTION INTRAVENOUS ONCE
Status: DISCONTINUED | OUTPATIENT
Start: 2018-05-13 | End: 2018-05-14

## 2018-05-13 RX ORDER — ONDANSETRON 2 MG/ML
4 INJECTION INTRAMUSCULAR; INTRAVENOUS EVERY 6 HOURS PRN
Status: DISCONTINUED | OUTPATIENT
Start: 2018-05-13 | End: 2018-05-15 | Stop reason: HOSPADM

## 2018-05-13 RX ORDER — POTASSIUM CHLORIDE 7.45 MG/ML
10 INJECTION INTRAVENOUS PRN
Status: DISCONTINUED | OUTPATIENT
Start: 2018-05-13 | End: 2018-05-15 | Stop reason: HOSPADM

## 2018-05-13 RX ORDER — DEXTROSE, SODIUM CHLORIDE, AND POTASSIUM CHLORIDE 5; .45; .15 G/100ML; G/100ML; G/100ML
INJECTION INTRAVENOUS CONTINUOUS PRN
Status: DISCONTINUED | OUTPATIENT
Start: 2018-05-13 | End: 2018-05-14

## 2018-05-13 RX ORDER — NICOTINE 21 MG/24HR
1 PATCH, TRANSDERMAL 24 HOURS TRANSDERMAL DAILY
Status: DISCONTINUED | OUTPATIENT
Start: 2018-05-13 | End: 2018-05-13

## 2018-05-13 RX ORDER — SODIUM CHLORIDE 9 MG/ML
INJECTION, SOLUTION INTRAVENOUS CONTINUOUS
Status: DISCONTINUED | OUTPATIENT
Start: 2018-05-13 | End: 2018-05-14

## 2018-05-13 RX ORDER — ONDANSETRON 2 MG/ML
4 INJECTION INTRAMUSCULAR; INTRAVENOUS ONCE
Status: COMPLETED | OUTPATIENT
Start: 2018-05-13 | End: 2018-05-13

## 2018-05-13 RX ORDER — MAGNESIUM SULFATE 1 G/100ML
1 INJECTION INTRAVENOUS PRN
Status: DISCONTINUED | OUTPATIENT
Start: 2018-05-13 | End: 2018-05-15 | Stop reason: HOSPADM

## 2018-05-13 RX ORDER — 0.9 % SODIUM CHLORIDE 0.9 %
20 INTRAVENOUS SOLUTION INTRAVENOUS ONCE
Status: COMPLETED | OUTPATIENT
Start: 2018-05-13 | End: 2018-05-13

## 2018-05-13 RX ORDER — 0.9 % SODIUM CHLORIDE 0.9 %
2000 INTRAVENOUS SOLUTION INTRAVENOUS ONCE
Status: COMPLETED | OUTPATIENT
Start: 2018-05-13 | End: 2018-05-13

## 2018-05-13 RX ORDER — MAGNESIUM SULFATE 1 G/100ML
1 INJECTION INTRAVENOUS PRN
Status: DISCONTINUED | OUTPATIENT
Start: 2018-05-13 | End: 2018-05-14

## 2018-05-13 RX ORDER — 0.9 % SODIUM CHLORIDE 0.9 %
15 INTRAVENOUS SOLUTION INTRAVENOUS ONCE
Status: DISCONTINUED | OUTPATIENT
Start: 2018-05-13 | End: 2018-05-14

## 2018-05-13 RX ADMIN — SODIUM CHLORIDE: 4.5 INJECTION, SOLUTION INTRAVENOUS at 16:54

## 2018-05-13 RX ADMIN — SODIUM CHLORIDE 2000 ML: 9 INJECTION, SOLUTION INTRAVENOUS at 18:36

## 2018-05-13 RX ADMIN — SODIUM CHLORIDE 1224 ML: 9 INJECTION, SOLUTION INTRAVENOUS at 15:04

## 2018-05-13 RX ADMIN — ONDANSETRON 4 MG: 2 INJECTION INTRAMUSCULAR; INTRAVENOUS at 16:00

## 2018-05-13 RX ADMIN — SODIUM CHLORIDE 0.1 UNITS/KG/HR: 9 INJECTION, SOLUTION INTRAVENOUS at 16:03

## 2018-05-13 RX ADMIN — ENOXAPARIN SODIUM 40 MG: 40 INJECTION SUBCUTANEOUS at 21:30

## 2018-05-13 ASSESSMENT — PAIN SCALES - GENERAL
PAINLEVEL_OUTOF10: 9
PAINLEVEL_OUTOF10: 0

## 2018-05-13 ASSESSMENT — ENCOUNTER SYMPTOMS
NAUSEA: 1
SHORTNESS OF BREATH: 0
VOMITING: 1
ABDOMINAL PAIN: 0
CHOKING: 0
WHEEZING: 0
DIARRHEA: 0
BACK PAIN: 0
COLOR CHANGE: 0

## 2018-05-13 ASSESSMENT — PAIN DESCRIPTION - LOCATION: LOCATION: GENERALIZED

## 2018-05-13 ASSESSMENT — PAIN DESCRIPTION - DESCRIPTORS: DESCRIPTORS: ACHING

## 2018-05-14 LAB
ANION GAP SERPL CALCULATED.3IONS-SCNC: 13 MMOL/L (ref 7–16)
ANION GAP SERPL CALCULATED.3IONS-SCNC: 19 MMOL/L (ref 7–16)
BUN BLDV-MCNC: 30 MG/DL (ref 6–20)
BUN BLDV-MCNC: 33 MG/DL (ref 6–20)
CALCIUM SERPL-MCNC: 7.7 MG/DL (ref 8.6–10.2)
CALCIUM SERPL-MCNC: 7.8 MG/DL (ref 8.6–10.2)
CHLORIDE BLD-SCNC: 107 MMOL/L (ref 98–107)
CHLORIDE BLD-SCNC: 108 MMOL/L (ref 98–107)
CO2: 15 MMOL/L (ref 22–29)
CO2: 18 MMOL/L (ref 22–29)
CREAT SERPL-MCNC: 1 MG/DL (ref 0.5–1)
CREAT SERPL-MCNC: 1.1 MG/DL (ref 0.5–1)
GFR AFRICAN AMERICAN: >60
GFR AFRICAN AMERICAN: >60
GFR NON-AFRICAN AMERICAN: >60 ML/MIN/1.73
GFR NON-AFRICAN AMERICAN: >60 ML/MIN/1.73
GLUCOSE BLD-MCNC: 153 MG/DL (ref 74–109)
GLUCOSE BLD-MCNC: 156 MG/DL (ref 74–109)
HBA1C MFR BLD: 11.6 % (ref 4.8–5.9)
HCT VFR BLD CALC: 30 % (ref 34–48)
HEMOGLOBIN: 9.9 G/DL (ref 11.5–15.5)
LACTIC ACID: 1.2 MMOL/L (ref 0.5–2.2)
MAGNESIUM: 1.9 MG/DL (ref 1.6–2.6)
MAGNESIUM: 2 MG/DL (ref 1.6–2.6)
MCH RBC QN AUTO: 31.3 PG (ref 26–35)
MCHC RBC AUTO-ENTMCNC: 33 % (ref 32–34.5)
MCV RBC AUTO: 94.9 FL (ref 80–99.9)
METER GLUCOSE: 115 MG/DL (ref 70–110)
METER GLUCOSE: 132 MG/DL (ref 70–110)
METER GLUCOSE: 149 MG/DL (ref 70–110)
METER GLUCOSE: 150 MG/DL (ref 70–110)
METER GLUCOSE: 154 MG/DL (ref 70–110)
METER GLUCOSE: 155 MG/DL (ref 70–110)
METER GLUCOSE: 158 MG/DL (ref 70–110)
METER GLUCOSE: 164 MG/DL (ref 70–110)
METER GLUCOSE: 185 MG/DL (ref 70–110)
METER GLUCOSE: 259 MG/DL (ref 70–110)
METER GLUCOSE: 84 MG/DL (ref 70–110)
PDW BLD-RTO: 13.2 FL (ref 11.5–15)
PHOSPHORUS: 2.1 MG/DL (ref 2.5–4.5)
PHOSPHORUS: 2.7 MG/DL (ref 2.5–4.5)
PLATELET # BLD: 293 E9/L (ref 130–450)
PMV BLD AUTO: 10.4 FL (ref 7–12)
POTASSIUM SERPL-SCNC: 3.9 MMOL/L (ref 3.5–5)
POTASSIUM SERPL-SCNC: 4.9 MMOL/L (ref 3.5–5)
RBC # BLD: 3.16 E12/L (ref 3.5–5.5)
SODIUM BLD-SCNC: 139 MMOL/L (ref 132–146)
SODIUM BLD-SCNC: 141 MMOL/L (ref 132–146)
WBC # BLD: 15.7 E9/L (ref 4.5–11.5)

## 2018-05-14 PROCEDURE — 2500000003 HC RX 250 WO HCPCS: Performed by: NURSE PRACTITIONER

## 2018-05-14 PROCEDURE — 2500000003 HC RX 250 WO HCPCS: Performed by: STUDENT IN AN ORGANIZED HEALTH CARE EDUCATION/TRAINING PROGRAM

## 2018-05-14 PROCEDURE — 2580000003 HC RX 258: Performed by: NURSE PRACTITIONER

## 2018-05-14 PROCEDURE — 6370000000 HC RX 637 (ALT 250 FOR IP): Performed by: HOSPITALIST

## 2018-05-14 PROCEDURE — 6360000002 HC RX W HCPCS: Performed by: HOSPITALIST

## 2018-05-14 PROCEDURE — 6360000002 HC RX W HCPCS: Performed by: NURSE PRACTITIONER

## 2018-05-14 PROCEDURE — 1200000000 HC SEMI PRIVATE

## 2018-05-14 PROCEDURE — 36592 COLLECT BLOOD FROM PICC: CPT

## 2018-05-14 PROCEDURE — 85027 COMPLETE CBC AUTOMATED: CPT

## 2018-05-14 PROCEDURE — 82962 GLUCOSE BLOOD TEST: CPT

## 2018-05-14 PROCEDURE — 83605 ASSAY OF LACTIC ACID: CPT

## 2018-05-14 PROCEDURE — 2580000003 HC RX 258: Performed by: HOSPITALIST

## 2018-05-14 PROCEDURE — 80048 BASIC METABOLIC PNL TOTAL CA: CPT

## 2018-05-14 PROCEDURE — 84100 ASSAY OF PHOSPHORUS: CPT

## 2018-05-14 PROCEDURE — 83735 ASSAY OF MAGNESIUM: CPT

## 2018-05-14 PROCEDURE — 36415 COLL VENOUS BLD VENIPUNCTURE: CPT

## 2018-05-14 PROCEDURE — 83036 HEMOGLOBIN GLYCOSYLATED A1C: CPT

## 2018-05-14 RX ORDER — DEXTROSE MONOHYDRATE 50 MG/ML
100 INJECTION, SOLUTION INTRAVENOUS PRN
Status: DISCONTINUED | OUTPATIENT
Start: 2018-05-14 | End: 2018-05-15 | Stop reason: HOSPADM

## 2018-05-14 RX ORDER — KETOROLAC TROMETHAMINE 15 MG/ML
15 INJECTION, SOLUTION INTRAMUSCULAR; INTRAVENOUS ONCE
Status: COMPLETED | OUTPATIENT
Start: 2018-05-14 | End: 2018-05-14

## 2018-05-14 RX ORDER — INSULIN GLARGINE 100 [IU]/ML
25 INJECTION, SOLUTION SUBCUTANEOUS DAILY
Status: DISCONTINUED | OUTPATIENT
Start: 2018-05-14 | End: 2018-05-15 | Stop reason: HOSPADM

## 2018-05-14 RX ORDER — NICOTINE POLACRILEX 4 MG
15 LOZENGE BUCCAL PRN
Status: DISCONTINUED | OUTPATIENT
Start: 2018-05-14 | End: 2018-05-15 | Stop reason: HOSPADM

## 2018-05-14 RX ORDER — SODIUM CHLORIDE 9 MG/ML
INJECTION, SOLUTION INTRAVENOUS CONTINUOUS
Status: DISCONTINUED | OUTPATIENT
Start: 2018-05-14 | End: 2018-05-15 | Stop reason: HOSPADM

## 2018-05-14 RX ORDER — DEXTROSE MONOHYDRATE 25 G/50ML
12.5 INJECTION, SOLUTION INTRAVENOUS PRN
Status: DISCONTINUED | OUTPATIENT
Start: 2018-05-14 | End: 2018-05-15 | Stop reason: HOSPADM

## 2018-05-14 RX ORDER — PROMETHAZINE HYDROCHLORIDE 25 MG/ML
12.5 INJECTION, SOLUTION INTRAMUSCULAR; INTRAVENOUS EVERY 6 HOURS PRN
Status: DISCONTINUED | OUTPATIENT
Start: 2018-05-14 | End: 2018-05-15 | Stop reason: HOSPADM

## 2018-05-14 RX ADMIN — POTASSIUM CHLORIDE 10 MEQ: 10 INJECTION, SOLUTION INTRAVENOUS at 02:46

## 2018-05-14 RX ADMIN — PROMETHAZINE HYDROCHLORIDE 12.5 MG: 25 INJECTION INTRAMUSCULAR; INTRAVENOUS at 09:17

## 2018-05-14 RX ADMIN — POTASSIUM CHLORIDE 10 MEQ: 10 INJECTION, SOLUTION INTRAVENOUS at 06:38

## 2018-05-14 RX ADMIN — POTASSIUM CHLORIDE, DEXTROSE MONOHYDRATE AND SODIUM CHLORIDE: 150; 5; 450 INJECTION, SOLUTION INTRAVENOUS at 07:23

## 2018-05-14 RX ADMIN — INSULIN LISPRO 1 UNITS: 100 INJECTION, SOLUTION INTRAVENOUS; SUBCUTANEOUS at 21:16

## 2018-05-14 RX ADMIN — ONDANSETRON 4 MG: 2 INJECTION INTRAMUSCULAR; INTRAVENOUS at 20:36

## 2018-05-14 RX ADMIN — POTASSIUM CHLORIDE 10 MEQ: 10 INJECTION, SOLUTION INTRAVENOUS at 04:57

## 2018-05-14 RX ADMIN — INSULIN LISPRO 2 UNITS: 100 INJECTION, SOLUTION INTRAVENOUS; SUBCUTANEOUS at 09:23

## 2018-05-14 RX ADMIN — INSULIN GLARGINE 25 UNITS: 100 INJECTION, SOLUTION SUBCUTANEOUS at 09:23

## 2018-05-14 RX ADMIN — ONDANSETRON 4 MG: 2 INJECTION INTRAMUSCULAR; INTRAVENOUS at 14:20

## 2018-05-14 RX ADMIN — INSULIN LISPRO 2 UNITS: 100 INJECTION, SOLUTION INTRAVENOUS; SUBCUTANEOUS at 12:48

## 2018-05-14 RX ADMIN — PROMETHAZINE HYDROCHLORIDE 12.5 MG: 25 INJECTION INTRAMUSCULAR; INTRAVENOUS at 16:38

## 2018-05-14 RX ADMIN — SODIUM CHLORIDE: 9 INJECTION, SOLUTION INTRAVENOUS at 09:13

## 2018-05-14 RX ADMIN — ENOXAPARIN SODIUM 40 MG: 40 INJECTION SUBCUTANEOUS at 09:18

## 2018-05-14 RX ADMIN — SODIUM PHOSPHATE, MONOBASIC, MONOHYDRATE 10 MMOL: 276; 142 INJECTION, SOLUTION INTRAVENOUS at 09:13

## 2018-05-14 RX ADMIN — POTASSIUM CHLORIDE, DEXTROSE MONOHYDRATE AND SODIUM CHLORIDE: 150; 5; 450 INJECTION, SOLUTION INTRAVENOUS at 01:33

## 2018-05-14 RX ADMIN — SODIUM PHOSPHATE, MONOBASIC, MONOHYDRATE 15 MMOL: 276; 142 INJECTION, SOLUTION INTRAVENOUS at 00:17

## 2018-05-14 RX ADMIN — POTASSIUM CHLORIDE 10 MEQ: 10 INJECTION, SOLUTION INTRAVENOUS at 07:41

## 2018-05-14 RX ADMIN — POTASSIUM CHLORIDE 10 MEQ: 10 INJECTION, SOLUTION INTRAVENOUS at 03:52

## 2018-05-14 RX ADMIN — KETOROLAC TROMETHAMINE 15 MG: 15 INJECTION, SOLUTION INTRAMUSCULAR; INTRAVENOUS at 03:06

## 2018-05-14 RX ADMIN — ONDANSETRON 4 MG: 2 INJECTION INTRAMUSCULAR; INTRAVENOUS at 01:49

## 2018-05-14 ASSESSMENT — PAIN DESCRIPTION - FREQUENCY
FREQUENCY: INTERMITTENT
FREQUENCY: CONTINUOUS
FREQUENCY: CONTINUOUS

## 2018-05-14 ASSESSMENT — PAIN SCALES - GENERAL
PAINLEVEL_OUTOF10: 0
PAINLEVEL_OUTOF10: 0
PAINLEVEL_OUTOF10: 2
PAINLEVEL_OUTOF10: 8
PAINLEVEL_OUTOF10: 2
PAINLEVEL_OUTOF10: 0

## 2018-05-14 ASSESSMENT — PAIN DESCRIPTION - DESCRIPTORS
DESCRIPTORS: HEADACHE

## 2018-05-14 ASSESSMENT — PAIN DESCRIPTION - ONSET
ONSET: AWAKENED FROM SLEEP
ONSET: GRADUAL
ONSET: ON-GOING

## 2018-05-14 ASSESSMENT — PAIN DESCRIPTION - PAIN TYPE
TYPE: ACUTE PAIN

## 2018-05-14 ASSESSMENT — PAIN DESCRIPTION - LOCATION
LOCATION: HEAD

## 2018-05-14 ASSESSMENT — PAIN DESCRIPTION - PROGRESSION
CLINICAL_PROGRESSION: GRADUALLY WORSENING
CLINICAL_PROGRESSION: GRADUALLY WORSENING

## 2018-05-14 ASSESSMENT — PAIN DESCRIPTION - ORIENTATION: ORIENTATION: RIGHT;LEFT

## 2018-05-15 VITALS
HEART RATE: 75 BPM | TEMPERATURE: 98 F | WEIGHT: 134.6 LBS | SYSTOLIC BLOOD PRESSURE: 124 MMHG | DIASTOLIC BLOOD PRESSURE: 75 MMHG | OXYGEN SATURATION: 96 % | RESPIRATION RATE: 14 BRPM | HEIGHT: 64 IN | BODY MASS INDEX: 22.98 KG/M2

## 2018-05-15 PROBLEM — E86.0 DEHYDRATION: Status: RESOLVED | Noted: 2018-03-18 | Resolved: 2018-05-15

## 2018-05-15 LAB
ANION GAP SERPL CALCULATED.3IONS-SCNC: 7 MMOL/L (ref 7–16)
BUN BLDV-MCNC: 14 MG/DL (ref 6–20)
CALCIUM SERPL-MCNC: 7.5 MG/DL (ref 8.6–10.2)
CHLORIDE BLD-SCNC: 111 MMOL/L (ref 98–107)
CO2: 22 MMOL/L (ref 22–29)
CREAT SERPL-MCNC: 0.8 MG/DL (ref 0.5–1)
GFR AFRICAN AMERICAN: >60
GFR NON-AFRICAN AMERICAN: >60 ML/MIN/1.73
GLUCOSE BLD-MCNC: 118 MG/DL (ref 74–109)
MAGNESIUM: 1.8 MG/DL (ref 1.6–2.6)
METER GLUCOSE: 121 MG/DL (ref 70–110)
METER GLUCOSE: 181 MG/DL (ref 70–110)
METER GLUCOSE: 95 MG/DL (ref 70–110)
MRSA CULTURE ONLY: NORMAL
PHOSPHORUS: 2.1 MG/DL (ref 2.5–4.5)
POTASSIUM SERPL-SCNC: 4 MMOL/L (ref 3.5–5)
SODIUM BLD-SCNC: 140 MMOL/L (ref 132–146)

## 2018-05-15 PROCEDURE — 2580000003 HC RX 258: Performed by: HOSPITALIST

## 2018-05-15 PROCEDURE — 83735 ASSAY OF MAGNESIUM: CPT

## 2018-05-15 PROCEDURE — 6360000002 HC RX W HCPCS: Performed by: HOSPITALIST

## 2018-05-15 PROCEDURE — 6370000000 HC RX 637 (ALT 250 FOR IP): Performed by: HOSPITALIST

## 2018-05-15 PROCEDURE — 84100 ASSAY OF PHOSPHORUS: CPT

## 2018-05-15 PROCEDURE — 6360000002 HC RX W HCPCS: Performed by: NURSE PRACTITIONER

## 2018-05-15 PROCEDURE — 2580000003 HC RX 258

## 2018-05-15 PROCEDURE — 80048 BASIC METABOLIC PNL TOTAL CA: CPT

## 2018-05-15 PROCEDURE — 6370000000 HC RX 637 (ALT 250 FOR IP): Performed by: NURSE PRACTITIONER

## 2018-05-15 PROCEDURE — 82962 GLUCOSE BLOOD TEST: CPT

## 2018-05-15 PROCEDURE — 36415 COLL VENOUS BLD VENIPUNCTURE: CPT

## 2018-05-15 RX ORDER — PROMETHAZINE HYDROCHLORIDE 12.5 MG/1
12.5 TABLET ORAL EVERY 8 HOURS PRN
Qty: 20 TABLET | Refills: 0 | Status: SHIPPED | OUTPATIENT
Start: 2018-05-15 | End: 2018-05-22

## 2018-05-15 RX ORDER — SODIUM CHLORIDE 0.9 % (FLUSH) 0.9 %
SYRINGE (ML) INJECTION
Status: COMPLETED
Start: 2018-05-15 | End: 2018-05-15

## 2018-05-15 RX ORDER — CALCIUM CARBONATE 200(500)MG
500 TABLET,CHEWABLE ORAL 3 TIMES DAILY PRN
Qty: 50 TABLET | Refills: 0 | Status: SHIPPED | OUTPATIENT
Start: 2018-05-15 | End: 2018-06-14

## 2018-05-15 RX ORDER — CALCIUM CARBONATE 200(500)MG
500 TABLET,CHEWABLE ORAL 3 TIMES DAILY PRN
Status: DISCONTINUED | OUTPATIENT
Start: 2018-05-15 | End: 2018-05-15 | Stop reason: HOSPADM

## 2018-05-15 RX ORDER — FAMOTIDINE 20 MG/1
20 TABLET, FILM COATED ORAL 2 TIMES DAILY
Status: DISCONTINUED | OUTPATIENT
Start: 2018-05-15 | End: 2018-05-15 | Stop reason: HOSPADM

## 2018-05-15 RX ORDER — FAMOTIDINE 20 MG/1
20 TABLET, FILM COATED ORAL 2 TIMES DAILY
Qty: 60 TABLET | Refills: 3 | Status: ON HOLD | OUTPATIENT
Start: 2018-05-15 | End: 2018-06-25

## 2018-05-15 RX ADMIN — INSULIN GLARGINE 25 UNITS: 100 INJECTION, SOLUTION SUBCUTANEOUS at 08:54

## 2018-05-15 RX ADMIN — SODIUM CHLORIDE: 9 INJECTION, SOLUTION INTRAVENOUS at 07:38

## 2018-05-15 RX ADMIN — Medication 10 ML: at 05:10

## 2018-05-15 RX ADMIN — ONDANSETRON 4 MG: 2 INJECTION INTRAMUSCULAR; INTRAVENOUS at 05:10

## 2018-05-15 RX ADMIN — PROMETHAZINE HYDROCHLORIDE 12.5 MG: 25 INJECTION INTRAMUSCULAR; INTRAVENOUS at 08:21

## 2018-05-15 RX ADMIN — FAMOTIDINE 20 MG: 20 TABLET ORAL at 08:28

## 2018-05-15 ASSESSMENT — PAIN SCALES - GENERAL: PAINLEVEL_OUTOF10: 0

## 2018-05-18 LAB — BLOOD CULTURE, ROUTINE: NORMAL

## 2018-05-21 LAB
EKG ATRIAL RATE: 132 BPM
EKG P AXIS: 75 DEGREES
EKG P-R INTERVAL: 112 MS
EKG Q-T INTERVAL: 302 MS
EKG QRS DURATION: 86 MS
EKG QTC CALCULATION (BAZETT): 447 MS
EKG R AXIS: 68 DEGREES
EKG T AXIS: 7 DEGREES
EKG VENTRICULAR RATE: 132 BPM

## 2018-05-24 ENCOUNTER — HOSPITAL ENCOUNTER (EMERGENCY)
Age: 28
Discharge: HOME | End: 2018-05-25
Attending: EMERGENCY MEDICINE
Payer: COMMERCIAL

## 2018-05-24 ENCOUNTER — APPOINTMENT (OUTPATIENT)
Dept: GENERAL RADIOLOGY | Age: 28
End: 2018-05-24
Payer: COMMERCIAL

## 2018-05-24 VITALS
BODY MASS INDEX: 24.92 KG/M2 | SYSTOLIC BLOOD PRESSURE: 112 MMHG | RESPIRATION RATE: 20 BRPM | OXYGEN SATURATION: 100 % | TEMPERATURE: 98.8 F | HEIGHT: 64 IN | HEART RATE: 105 BPM | WEIGHT: 146 LBS | DIASTOLIC BLOOD PRESSURE: 69 MMHG

## 2018-05-24 DIAGNOSIS — R06.02 SOB (SHORTNESS OF BREATH): ICD-10-CM

## 2018-05-24 DIAGNOSIS — E86.0 MILD DEHYDRATION: Primary | ICD-10-CM

## 2018-05-24 LAB
ALBUMIN SERPL-MCNC: 3.7 G/DL (ref 3.5–5.2)
ALP BLD-CCNC: 133 U/L (ref 35–104)
ALT SERPL-CCNC: 9 U/L (ref 0–32)
ANION GAP SERPL CALCULATED.3IONS-SCNC: 15 MMOL/L (ref 7–16)
AST SERPL-CCNC: 14 U/L (ref 0–31)
BASOPHILS ABSOLUTE: 0.11 E9/L (ref 0–0.2)
BASOPHILS RELATIVE PERCENT: 1.6 % (ref 0–2)
BETA-HYDROXYBUTYRATE: 0.13 MMOL/L (ref 0.02–0.27)
BILIRUB SERPL-MCNC: 0.2 MG/DL (ref 0–1.2)
BILIRUBIN URINE: ABNORMAL
BLOOD, URINE: ABNORMAL
BUN BLDV-MCNC: 26 MG/DL (ref 6–20)
CALCIUM SERPL-MCNC: 9 MG/DL (ref 8.6–10.2)
CHLORIDE BLD-SCNC: 95 MMOL/L (ref 98–107)
CHP ED QC CHECK: YES
CLARITY: ABNORMAL
CO2: 26 MMOL/L (ref 22–29)
COLOR: YELLOW
CREAT SERPL-MCNC: 1.1 MG/DL (ref 0.5–1)
EOSINOPHILS ABSOLUTE: 0.15 E9/L (ref 0.05–0.5)
EOSINOPHILS RELATIVE PERCENT: 2.2 % (ref 0–6)
GFR AFRICAN AMERICAN: >60
GFR NON-AFRICAN AMERICAN: >60 ML/MIN/1.73
GLUCOSE BLD-MCNC: 272 MG/DL (ref 74–109)
GLUCOSE BLD-MCNC: 276 MG/DL
GLUCOSE BLD-MCNC: 312 MG/DL
GLUCOSE URINE: >=1000 MG/DL
HCT VFR BLD CALC: 32.8 % (ref 34–48)
HEMOGLOBIN: 11.2 G/DL (ref 11.5–15.5)
IMMATURE GRANULOCYTES #: 0.01 E9/L
IMMATURE GRANULOCYTES %: 0.1 % (ref 0–5)
KETONES, URINE: 15 MG/DL
LACTIC ACID: 2.4 MMOL/L (ref 0.5–2.2)
LEUKOCYTE ESTERASE, URINE: NEGATIVE
LIPASE: 9 U/L (ref 13–60)
LYMPHOCYTES ABSOLUTE: 2.52 E9/L (ref 1.5–4)
LYMPHOCYTES RELATIVE PERCENT: 36.6 % (ref 20–42)
MAGNESIUM: 1.9 MG/DL (ref 1.6–2.6)
MCH RBC QN AUTO: 31.6 PG (ref 26–35)
MCHC RBC AUTO-ENTMCNC: 34.1 % (ref 32–34.5)
MCV RBC AUTO: 92.7 FL (ref 80–99.9)
METER GLUCOSE: 312 MG/DL (ref 70–110)
MONOCYTES ABSOLUTE: 0.34 E9/L (ref 0.1–0.95)
MONOCYTES RELATIVE PERCENT: 4.9 % (ref 2–12)
NEUTROPHILS ABSOLUTE: 3.76 E9/L (ref 1.8–7.3)
NEUTROPHILS RELATIVE PERCENT: 54.6 % (ref 43–80)
NITRITE, URINE: NEGATIVE
PDW BLD-RTO: 12.4 FL (ref 11.5–15)
PH UA: 5.5 (ref 5–9)
PH VENOUS: 7.39 (ref 7.3–7.42)
PHOSPHORUS: 3.2 MG/DL (ref 2.5–4.5)
PLATELET # BLD: 393 E9/L (ref 130–450)
PMV BLD AUTO: 10.4 FL (ref 7–12)
POC ANION GAP: 15
POC BUN: 30
POC CHLORIDE: 97
POC CO2: 28
POC CREATININE: NORMAL
POC POTASSIUM: NORMAL
POC SODIUM: 136
POTASSIUM SERPL-SCNC: 3.5 MMOL/L (ref 3.5–5)
PREGNANCY TEST URINE, POC: NEGATIVE
PROTEIN UA: 100 MG/DL
RBC # BLD: 3.54 E12/L (ref 3.5–5.5)
SODIUM BLD-SCNC: 136 MMOL/L (ref 132–146)
SPECIFIC GRAVITY UA: >=1.03 (ref 1–1.03)
TOTAL PROTEIN: 6.5 G/DL (ref 6.4–8.3)
TROPONIN: <0.01 NG/ML (ref 0–0.03)
UROBILINOGEN, URINE: 0.2 E.U./DL
WBC # BLD: 6.9 E9/L (ref 4.5–11.5)

## 2018-05-24 PROCEDURE — 84100 ASSAY OF PHOSPHORUS: CPT

## 2018-05-24 PROCEDURE — 83605 ASSAY OF LACTIC ACID: CPT

## 2018-05-24 PROCEDURE — 93005 ELECTROCARDIOGRAM TRACING: CPT | Performed by: EMERGENCY MEDICINE

## 2018-05-24 PROCEDURE — 87088 URINE BACTERIA CULTURE: CPT

## 2018-05-24 PROCEDURE — 82800 BLOOD PH: CPT

## 2018-05-24 PROCEDURE — 80053 COMPREHEN METABOLIC PANEL: CPT

## 2018-05-24 PROCEDURE — 84484 ASSAY OF TROPONIN QUANT: CPT

## 2018-05-24 PROCEDURE — 85025 COMPLETE CBC W/AUTO DIFF WBC: CPT

## 2018-05-24 PROCEDURE — 96360 HYDRATION IV INFUSION INIT: CPT

## 2018-05-24 PROCEDURE — 82962 GLUCOSE BLOOD TEST: CPT

## 2018-05-24 PROCEDURE — 83690 ASSAY OF LIPASE: CPT

## 2018-05-24 PROCEDURE — 2580000003 HC RX 258: Performed by: EMERGENCY MEDICINE

## 2018-05-24 PROCEDURE — 81001 URINALYSIS AUTO W/SCOPE: CPT

## 2018-05-24 PROCEDURE — 96361 HYDRATE IV INFUSION ADD-ON: CPT

## 2018-05-24 PROCEDURE — 83735 ASSAY OF MAGNESIUM: CPT

## 2018-05-24 PROCEDURE — 82010 KETONE BODYS QUAN: CPT

## 2018-05-24 PROCEDURE — 99284 EMERGENCY DEPT VISIT MOD MDM: CPT

## 2018-05-24 PROCEDURE — 36415 COLL VENOUS BLD VENIPUNCTURE: CPT

## 2018-05-24 PROCEDURE — 71045 X-RAY EXAM CHEST 1 VIEW: CPT

## 2018-05-24 RX ORDER — SODIUM CHLORIDE 0.9 % (FLUSH) 0.9 %
SYRINGE (ML) INJECTION
Status: DISCONTINUED
Start: 2018-05-24 | End: 2018-05-25 | Stop reason: HOSPADM

## 2018-05-24 RX ORDER — 0.9 % SODIUM CHLORIDE 0.9 %
30 INTRAVENOUS SOLUTION INTRAVENOUS ONCE
Status: COMPLETED | OUTPATIENT
Start: 2018-05-24 | End: 2018-05-25

## 2018-05-24 RX ADMIN — SODIUM CHLORIDE 1986 ML: 9 INJECTION, SOLUTION INTRAVENOUS at 22:20

## 2018-05-25 ENCOUNTER — INITIAL CONSULT (OUTPATIENT)
Dept: SURGERY | Age: 28
End: 2018-05-25
Payer: COMMERCIAL

## 2018-05-25 ENCOUNTER — PREP FOR PROCEDURE (OUTPATIENT)
Dept: SURGERY | Age: 28
End: 2018-05-25

## 2018-05-25 VITALS
DIASTOLIC BLOOD PRESSURE: 79 MMHG | SYSTOLIC BLOOD PRESSURE: 116 MMHG | HEIGHT: 64 IN | WEIGHT: 146 LBS | RESPIRATION RATE: 12 BRPM | BODY MASS INDEX: 24.92 KG/M2 | HEART RATE: 91 BPM

## 2018-05-25 DIAGNOSIS — R10.13 EPIGASTRIC PAIN: Primary | ICD-10-CM

## 2018-05-25 LAB
BACTERIA: NORMAL /HPF
RBC UA: NORMAL /HPF (ref 0–2)
WBC UA: NORMAL /HPF (ref 0–5)
YEAST: NORMAL

## 2018-05-25 PROCEDURE — G8417 CALC BMI ABV UP PARAM F/U: HCPCS | Performed by: SURGERY

## 2018-05-25 PROCEDURE — 96361 HYDRATE IV INFUSION ADD-ON: CPT

## 2018-05-25 PROCEDURE — G8427 DOCREV CUR MEDS BY ELIG CLIN: HCPCS | Performed by: SURGERY

## 2018-05-25 PROCEDURE — 99244 OFF/OP CNSLTJ NEW/EST MOD 40: CPT | Performed by: SURGERY

## 2018-05-25 RX ORDER — SODIUM CHLORIDE, SODIUM LACTATE, POTASSIUM CHLORIDE, CALCIUM CHLORIDE 600; 310; 30; 20 MG/100ML; MG/100ML; MG/100ML; MG/100ML
INJECTION, SOLUTION INTRAVENOUS CONTINUOUS
Status: CANCELLED | OUTPATIENT
Start: 2018-05-25

## 2018-05-26 LAB — URINE CULTURE, ROUTINE: NORMAL

## 2018-05-27 LAB
EKG ATRIAL RATE: 109 BPM
EKG P AXIS: 52 DEGREES
EKG P-R INTERVAL: 126 MS
EKG Q-T INTERVAL: 420 MS
EKG QRS DURATION: 94 MS
EKG QTC CALCULATION (BAZETT): 565 MS
EKG R AXIS: 56 DEGREES
EKG T AXIS: 40 DEGREES
EKG VENTRICULAR RATE: 109 BPM

## 2018-05-29 ENCOUNTER — TELEPHONE (OUTPATIENT)
Dept: SURGERY | Age: 28
End: 2018-05-29

## 2018-05-30 ENCOUNTER — ANESTHESIA (OUTPATIENT)
Dept: ENDOSCOPY | Age: 28
End: 2018-05-30
Payer: COMMERCIAL

## 2018-05-30 ENCOUNTER — HOSPITAL ENCOUNTER (OUTPATIENT)
Age: 28
Setting detail: OUTPATIENT SURGERY
Discharge: HOME | End: 2018-05-30
Attending: SURGERY | Admitting: SURGERY
Payer: COMMERCIAL

## 2018-05-30 ENCOUNTER — HOSPITAL ENCOUNTER (OUTPATIENT)
Dept: ULTRASOUND IMAGING | Age: 28
Discharge: HOME | End: 2018-05-30
Payer: COMMERCIAL

## 2018-05-30 ENCOUNTER — ANESTHESIA EVENT (OUTPATIENT)
Dept: ENDOSCOPY | Age: 28
End: 2018-05-30
Payer: COMMERCIAL

## 2018-05-30 VITALS
OXYGEN SATURATION: 100 % | DIASTOLIC BLOOD PRESSURE: 85 MMHG | SYSTOLIC BLOOD PRESSURE: 115 MMHG | RESPIRATION RATE: 16 BRPM

## 2018-05-30 VITALS
RESPIRATION RATE: 16 BRPM | SYSTOLIC BLOOD PRESSURE: 119 MMHG | TEMPERATURE: 97.4 F | OXYGEN SATURATION: 98 % | HEART RATE: 64 BPM | DIASTOLIC BLOOD PRESSURE: 69 MMHG

## 2018-05-30 DIAGNOSIS — R10.13 EPIGASTRIC PAIN: ICD-10-CM

## 2018-05-30 LAB
HCG(URINE) PREGNANCY TEST: NEGATIVE
METER GLUCOSE: 342 MG/DL (ref 70–110)

## 2018-05-30 PROCEDURE — 3609017100 HC EGD: Performed by: SURGERY

## 2018-05-30 PROCEDURE — 43239 EGD BIOPSY SINGLE/MULTIPLE: CPT | Performed by: SURGERY

## 2018-05-30 PROCEDURE — 6360000002 HC RX W HCPCS: Performed by: SURGERY

## 2018-05-30 PROCEDURE — 7100000011 HC PHASE II RECOVERY - ADDTL 15 MIN: Performed by: SURGERY

## 2018-05-30 PROCEDURE — 82962 GLUCOSE BLOOD TEST: CPT

## 2018-05-30 PROCEDURE — 81025 URINE PREGNANCY TEST: CPT

## 2018-05-30 PROCEDURE — 6360000002 HC RX W HCPCS: Performed by: NURSE ANESTHETIST, CERTIFIED REGISTERED

## 2018-05-30 PROCEDURE — 2580000003 HC RX 258: Performed by: NURSE ANESTHETIST, CERTIFIED REGISTERED

## 2018-05-30 PROCEDURE — C1773 RET DEV, INSERTABLE: HCPCS | Performed by: SURGERY

## 2018-05-30 PROCEDURE — 3700000001 HC ADD 15 MINUTES (ANESTHESIA): Performed by: SURGERY

## 2018-05-30 PROCEDURE — 2709999900 HC NON-CHARGEABLE SUPPLY: Performed by: SURGERY

## 2018-05-30 PROCEDURE — 7100000010 HC PHASE II RECOVERY - FIRST 15 MIN: Performed by: SURGERY

## 2018-05-30 PROCEDURE — 76705 ECHO EXAM OF ABDOMEN: CPT

## 2018-05-30 PROCEDURE — 3700000000 HC ANESTHESIA ATTENDED CARE: Performed by: SURGERY

## 2018-05-30 PROCEDURE — 88305 TISSUE EXAM BY PATHOLOGIST: CPT

## 2018-05-30 PROCEDURE — 88342 IMHCHEM/IMCYTCHM 1ST ANTB: CPT

## 2018-05-30 RX ORDER — HEPARIN SODIUM (PORCINE) LOCK FLUSH IV SOLN 100 UNIT/ML 100 UNIT/ML
100 SOLUTION INTRAVENOUS PRN
Status: DISCONTINUED | OUTPATIENT
Start: 2018-05-30 | End: 2018-05-30 | Stop reason: HOSPADM

## 2018-05-30 RX ORDER — SODIUM CHLORIDE, SODIUM LACTATE, POTASSIUM CHLORIDE, CALCIUM CHLORIDE 600; 310; 30; 20 MG/100ML; MG/100ML; MG/100ML; MG/100ML
INJECTION, SOLUTION INTRAVENOUS CONTINUOUS PRN
Status: DISCONTINUED | OUTPATIENT
Start: 2018-05-30 | End: 2018-05-30 | Stop reason: SDUPTHER

## 2018-05-30 RX ORDER — PROPOFOL 10 MG/ML
INJECTION, EMULSION INTRAVENOUS PRN
Status: DISCONTINUED | OUTPATIENT
Start: 2018-05-30 | End: 2018-05-30 | Stop reason: SDUPTHER

## 2018-05-30 RX ORDER — SODIUM CHLORIDE 0.9 % (FLUSH) 0.9 %
10 SYRINGE (ML) INJECTION 2 TIMES DAILY
Status: DISCONTINUED | OUTPATIENT
Start: 2018-05-30 | End: 2018-05-30 | Stop reason: HOSPADM

## 2018-05-30 RX ADMIN — PROPOFOL 150 MG: 10 INJECTION, EMULSION INTRAVENOUS at 14:45

## 2018-05-30 RX ADMIN — HEPARIN 100 UNITS: 100 SYRINGE at 16:15

## 2018-05-30 RX ADMIN — SODIUM CHLORIDE, POTASSIUM CHLORIDE, SODIUM LACTATE AND CALCIUM CHLORIDE: 600; 310; 30; 20 INJECTION, SOLUTION INTRAVENOUS at 14:44

## 2018-05-30 ASSESSMENT — PAIN - FUNCTIONAL ASSESSMENT: PAIN_FUNCTIONAL_ASSESSMENT: 0-10

## 2018-05-30 ASSESSMENT — PAIN SCALES - GENERAL: PAINLEVEL_OUTOF10: 0

## 2018-06-22 ENCOUNTER — TELEPHONE (OUTPATIENT)
Dept: SURGERY | Age: 28
End: 2018-06-22

## 2018-06-22 ENCOUNTER — PREP FOR PROCEDURE (OUTPATIENT)
Dept: SURGERY | Age: 28
End: 2018-06-22

## 2018-06-22 ENCOUNTER — OFFICE VISIT (OUTPATIENT)
Dept: SURGERY | Age: 28
End: 2018-06-22
Payer: COMMERCIAL

## 2018-06-22 VITALS
BODY MASS INDEX: 24.41 KG/M2 | DIASTOLIC BLOOD PRESSURE: 96 MMHG | RESPIRATION RATE: 12 BRPM | TEMPERATURE: 98.5 F | HEART RATE: 84 BPM | WEIGHT: 143 LBS | SYSTOLIC BLOOD PRESSURE: 141 MMHG | HEIGHT: 64 IN

## 2018-06-22 DIAGNOSIS — R19.7 DIARRHEA, UNSPECIFIED TYPE: Primary | ICD-10-CM

## 2018-06-22 PROCEDURE — G8420 CALC BMI NORM PARAMETERS: HCPCS | Performed by: SURGERY

## 2018-06-22 PROCEDURE — G8427 DOCREV CUR MEDS BY ELIG CLIN: HCPCS | Performed by: SURGERY

## 2018-06-22 PROCEDURE — 99214 OFFICE O/P EST MOD 30 MIN: CPT | Performed by: SURGERY

## 2018-06-22 PROCEDURE — 4004F PT TOBACCO SCREEN RCVD TLK: CPT | Performed by: SURGERY

## 2018-06-22 RX ORDER — SODIUM CHLORIDE 9 MG/ML
INJECTION, SOLUTION INTRAVENOUS CONTINUOUS
Status: CANCELLED | OUTPATIENT
Start: 2018-06-22

## 2018-06-25 ENCOUNTER — ANESTHESIA (OUTPATIENT)
Dept: ENDOSCOPY | Age: 28
End: 2018-06-25
Payer: COMMERCIAL

## 2018-06-25 ENCOUNTER — HOSPITAL ENCOUNTER (EMERGENCY)
Age: 28
Discharge: HOME | End: 2018-06-25
Attending: EMERGENCY MEDICINE
Payer: COMMERCIAL

## 2018-06-25 ENCOUNTER — APPOINTMENT (OUTPATIENT)
Dept: GENERAL RADIOLOGY | Age: 28
End: 2018-06-25
Payer: COMMERCIAL

## 2018-06-25 ENCOUNTER — ANESTHESIA EVENT (OUTPATIENT)
Dept: ENDOSCOPY | Age: 28
End: 2018-06-25
Payer: COMMERCIAL

## 2018-06-25 ENCOUNTER — HOSPITAL ENCOUNTER (OUTPATIENT)
Age: 28
Setting detail: OUTPATIENT SURGERY
Discharge: HOME | End: 2018-06-25
Attending: SURGERY | Admitting: SURGERY
Payer: COMMERCIAL

## 2018-06-25 VITALS — DIASTOLIC BLOOD PRESSURE: 53 MMHG | SYSTOLIC BLOOD PRESSURE: 84 MMHG | OXYGEN SATURATION: 100 %

## 2018-06-25 VITALS
RESPIRATION RATE: 18 BRPM | DIASTOLIC BLOOD PRESSURE: 92 MMHG | TEMPERATURE: 98.6 F | SYSTOLIC BLOOD PRESSURE: 133 MMHG | OXYGEN SATURATION: 100 % | HEIGHT: 64 IN | WEIGHT: 143 LBS | HEART RATE: 98 BPM | BODY MASS INDEX: 24.41 KG/M2

## 2018-06-25 VITALS
SYSTOLIC BLOOD PRESSURE: 118 MMHG | WEIGHT: 139.8 LBS | OXYGEN SATURATION: 100 % | BODY MASS INDEX: 23.87 KG/M2 | RESPIRATION RATE: 16 BRPM | TEMPERATURE: 96.6 F | HEART RATE: 82 BPM | DIASTOLIC BLOOD PRESSURE: 78 MMHG | HEIGHT: 64 IN

## 2018-06-25 DIAGNOSIS — S60.211A CONTUSION OF MULTIPLE SITES OF RIGHT HAND AND WRIST, INITIAL ENCOUNTER: Primary | ICD-10-CM

## 2018-06-25 DIAGNOSIS — S60.221A CONTUSION OF MULTIPLE SITES OF RIGHT HAND AND WRIST, INITIAL ENCOUNTER: Primary | ICD-10-CM

## 2018-06-25 LAB
HCG QUALITATIVE: NEGATIVE
METER GLUCOSE: 116 MG/DL (ref 70–110)
METER GLUCOSE: 83 MG/DL (ref 70–110)

## 2018-06-25 PROCEDURE — 7100000011 HC PHASE II RECOVERY - ADDTL 15 MIN: Performed by: SURGERY

## 2018-06-25 PROCEDURE — 36415 COLL VENOUS BLD VENIPUNCTURE: CPT

## 2018-06-25 PROCEDURE — 99283 EMERGENCY DEPT VISIT LOW MDM: CPT

## 2018-06-25 PROCEDURE — 45380 COLONOSCOPY AND BIOPSY: CPT | Performed by: SURGERY

## 2018-06-25 PROCEDURE — 2580000003 HC RX 258: Performed by: SURGERY

## 2018-06-25 PROCEDURE — 82962 GLUCOSE BLOOD TEST: CPT

## 2018-06-25 PROCEDURE — 84703 CHORIONIC GONADOTROPIN ASSAY: CPT

## 2018-06-25 PROCEDURE — 73110 X-RAY EXAM OF WRIST: CPT

## 2018-06-25 PROCEDURE — 3700000001 HC ADD 15 MINUTES (ANESTHESIA): Performed by: SURGERY

## 2018-06-25 PROCEDURE — 73130 X-RAY EXAM OF HAND: CPT

## 2018-06-25 PROCEDURE — 88305 TISSUE EXAM BY PATHOLOGIST: CPT

## 2018-06-25 PROCEDURE — 3700000000 HC ANESTHESIA ATTENDED CARE: Performed by: SURGERY

## 2018-06-25 PROCEDURE — 6360000002 HC RX W HCPCS: Performed by: NURSE ANESTHETIST, CERTIFIED REGISTERED

## 2018-06-25 PROCEDURE — 7100000010 HC PHASE II RECOVERY - FIRST 15 MIN: Performed by: SURGERY

## 2018-06-25 PROCEDURE — C1773 RET DEV, INSERTABLE: HCPCS | Performed by: SURGERY

## 2018-06-25 PROCEDURE — 2709999900 HC NON-CHARGEABLE SUPPLY: Performed by: SURGERY

## 2018-06-25 PROCEDURE — 3609010300 HC COLONOSCOPY W/BIOPSY SINGLE/MULTIPLE: Performed by: SURGERY

## 2018-06-25 RX ORDER — SODIUM CHLORIDE 0.9 % (FLUSH) 0.9 %
10 SYRINGE (ML) INJECTION PRN
Status: DISCONTINUED | OUTPATIENT
Start: 2018-06-25 | End: 2018-06-25 | Stop reason: HOSPADM

## 2018-06-25 RX ORDER — PROPOFOL 10 MG/ML
INJECTION, EMULSION INTRAVENOUS CONTINUOUS PRN
Status: DISCONTINUED | OUTPATIENT
Start: 2018-06-25 | End: 2018-06-25 | Stop reason: SDUPTHER

## 2018-06-25 RX ORDER — HEPARIN SODIUM (PORCINE) LOCK FLUSH IV SOLN 100 UNIT/ML 100 UNIT/ML
100 SOLUTION INTRAVENOUS ONCE
Status: DISCONTINUED | OUTPATIENT
Start: 2018-06-25 | End: 2018-06-25 | Stop reason: HOSPADM

## 2018-06-25 RX ORDER — SODIUM CHLORIDE 0.9 % (FLUSH) 0.9 %
10 SYRINGE (ML) INJECTION EVERY 12 HOURS SCHEDULED
Status: DISCONTINUED | OUTPATIENT
Start: 2018-06-25 | End: 2018-06-25 | Stop reason: HOSPADM

## 2018-06-25 RX ORDER — SODIUM CHLORIDE, SODIUM LACTATE, POTASSIUM CHLORIDE, CALCIUM CHLORIDE 600; 310; 30; 20 MG/100ML; MG/100ML; MG/100ML; MG/100ML
INJECTION, SOLUTION INTRAVENOUS CONTINUOUS
Status: DISCONTINUED | OUTPATIENT
Start: 2018-06-25 | End: 2018-06-25 | Stop reason: HOSPADM

## 2018-06-25 RX ORDER — TRAMADOL HYDROCHLORIDE 50 MG/1
50 TABLET ORAL EVERY 6 HOURS PRN
Qty: 20 TABLET | Refills: 0 | Status: SHIPPED | OUTPATIENT
Start: 2018-06-25 | End: 2018-06-30

## 2018-06-25 RX ORDER — SODIUM CHLORIDE 9 MG/ML
INJECTION, SOLUTION INTRAVENOUS CONTINUOUS
Status: DISCONTINUED | OUTPATIENT
Start: 2018-06-25 | End: 2018-06-25 | Stop reason: HOSPADM

## 2018-06-25 RX ADMIN — SODIUM CHLORIDE: 9 INJECTION, SOLUTION INTRAVENOUS at 09:00

## 2018-06-25 RX ADMIN — PROPOFOL 75 MCG/KG/MIN: 10 INJECTION, EMULSION INTRAVENOUS at 11:27

## 2018-06-25 RX ADMIN — SODIUM CHLORIDE: 9 INJECTION, SOLUTION INTRAVENOUS at 11:20

## 2018-06-25 ASSESSMENT — PAIN DESCRIPTION - ORIENTATION: ORIENTATION: RIGHT

## 2018-06-25 ASSESSMENT — PAIN DESCRIPTION - PAIN TYPE: TYPE: ACUTE PAIN

## 2018-06-25 ASSESSMENT — PAIN DESCRIPTION - DESCRIPTORS
DESCRIPTORS: THROBBING
DESCRIPTORS: CRAMPING

## 2018-06-25 ASSESSMENT — PAIN - FUNCTIONAL ASSESSMENT: PAIN_FUNCTIONAL_ASSESSMENT: 0-10

## 2018-06-25 ASSESSMENT — PAIN DESCRIPTION - LOCATION: LOCATION: WRIST

## 2018-06-25 ASSESSMENT — PAIN SCALES - GENERAL
PAINLEVEL_OUTOF10: 0
PAINLEVEL_OUTOF10: 0
PAINLEVEL_OUTOF10: 10

## 2018-06-29 ENCOUNTER — HOSPITAL ENCOUNTER (INPATIENT)
Age: 28
LOS: 1 days | Discharge: HOME | DRG: 420 | End: 2018-06-30
Attending: EMERGENCY MEDICINE | Admitting: INTERNAL MEDICINE
Payer: COMMERCIAL

## 2018-06-29 DIAGNOSIS — E10.10 DIABETIC KETOACIDOSIS WITHOUT COMA ASSOCIATED WITH TYPE 1 DIABETES MELLITUS (HCC): Primary | ICD-10-CM

## 2018-06-29 LAB
ALBUMIN SERPL-MCNC: 3.7 G/DL (ref 3.5–5.2)
ALP BLD-CCNC: 157 U/L (ref 35–104)
ALT SERPL-CCNC: 16 U/L (ref 0–32)
ANION GAP SERPL CALCULATED.3IONS-SCNC: 12 MMOL/L (ref 7–16)
ANION GAP SERPL CALCULATED.3IONS-SCNC: 12 MMOL/L (ref 7–16)
ANION GAP SERPL CALCULATED.3IONS-SCNC: 14 MMOL/L (ref 7–16)
ANION GAP SERPL CALCULATED.3IONS-SCNC: 26 MMOL/L (ref 7–16)
AST SERPL-CCNC: 17 U/L (ref 0–31)
BACTERIA: ABNORMAL /HPF
BASOPHILS ABSOLUTE: 0.11 E9/L (ref 0–0.2)
BASOPHILS RELATIVE PERCENT: 1.4 % (ref 0–2)
BILIRUB SERPL-MCNC: <0.2 MG/DL (ref 0–1.2)
BILIRUBIN DIRECT: <0.2 MG/DL (ref 0–0.3)
BILIRUBIN URINE: NEGATIVE
BILIRUBIN, INDIRECT: NORMAL MG/DL (ref 0–1)
BLOOD, URINE: ABNORMAL
BUN BLDV-MCNC: 22 MG/DL (ref 6–20)
BUN BLDV-MCNC: 22 MG/DL (ref 6–20)
BUN BLDV-MCNC: 23 MG/DL (ref 6–20)
BUN BLDV-MCNC: 33 MG/DL (ref 6–20)
CALCIUM SERPL-MCNC: 7.9 MG/DL (ref 8.6–10.2)
CALCIUM SERPL-MCNC: 8.1 MG/DL (ref 8.6–10.2)
CALCIUM SERPL-MCNC: 8.5 MG/DL (ref 8.6–10.2)
CALCIUM SERPL-MCNC: 9.3 MG/DL (ref 8.6–10.2)
CHLORIDE BLD-SCNC: 101 MMOL/L (ref 98–107)
CHLORIDE BLD-SCNC: 106 MMOL/L (ref 98–107)
CHLORIDE BLD-SCNC: 93 MMOL/L (ref 98–107)
CHLORIDE BLD-SCNC: 98 MMOL/L (ref 98–107)
CHP ED QC CHECK: NORMAL
CLARITY: ABNORMAL
CO2: 20 MMOL/L (ref 22–29)
CO2: 20 MMOL/L (ref 22–29)
CO2: 23 MMOL/L (ref 22–29)
CO2: 23 MMOL/L (ref 22–29)
COLOR: YELLOW
CREAT SERPL-MCNC: 0.9 MG/DL (ref 0.5–1)
CREAT SERPL-MCNC: 1.3 MG/DL (ref 0.5–1)
EKG ATRIAL RATE: 120 BPM
EKG P AXIS: 64 DEGREES
EKG P-R INTERVAL: 126 MS
EKG Q-T INTERVAL: 320 MS
EKG QRS DURATION: 90 MS
EKG QTC CALCULATION (BAZETT): 452 MS
EKG R AXIS: 69 DEGREES
EKG T AXIS: 51 DEGREES
EKG VENTRICULAR RATE: 120 BPM
EOSINOPHILS ABSOLUTE: 0.12 E9/L (ref 0.05–0.5)
EOSINOPHILS RELATIVE PERCENT: 1.5 % (ref 0–6)
EPITHELIAL CELLS, UA: ABNORMAL /HPF
GFR AFRICAN AMERICAN: 60
GFR AFRICAN AMERICAN: >60
GFR NON-AFRICAN AMERICAN: 60 ML/MIN/1.73
GFR NON-AFRICAN AMERICAN: >60 ML/MIN/1.73
GLUCOSE BLD-MCNC: 136 MG/DL (ref 74–109)
GLUCOSE BLD-MCNC: 198 MG/DL (ref 74–109)
GLUCOSE BLD-MCNC: 288 MG/DL
GLUCOSE BLD-MCNC: 531 MG/DL (ref 74–109)
GLUCOSE BLD-MCNC: 532 MG/DL (ref 74–109)
GLUCOSE URINE: >=1000 MG/DL
HBA1C MFR BLD: 11.7 % (ref 4–5.6)
HCT VFR BLD CALC: 34.8 % (ref 34–48)
HEMOGLOBIN: 11.6 G/DL (ref 11.5–15.5)
IMMATURE GRANULOCYTES #: 0.03 E9/L
IMMATURE GRANULOCYTES %: 0.4 % (ref 0–5)
KETONES, URINE: 40 MG/DL
LACTIC ACID: 4.4 MMOL/L (ref 0.5–2.2)
LEUKOCYTE ESTERASE, URINE: NEGATIVE
LIPASE: 10 U/L (ref 13–60)
LYMPHOCYTES ABSOLUTE: 1.92 E9/L (ref 1.5–4)
LYMPHOCYTES RELATIVE PERCENT: 23.7 % (ref 20–42)
MAGNESIUM: 1.7 MG/DL (ref 1.6–2.6)
MAGNESIUM: 1.7 MG/DL (ref 1.6–2.6)
MAGNESIUM: 1.8 MG/DL (ref 1.6–2.6)
MCH RBC QN AUTO: 31.1 PG (ref 26–35)
MCHC RBC AUTO-ENTMCNC: 33.3 % (ref 32–34.5)
MCV RBC AUTO: 93.3 FL (ref 80–99.9)
METER GLUCOSE: 130 MG/DL (ref 70–110)
METER GLUCOSE: 138 MG/DL (ref 70–110)
METER GLUCOSE: 159 MG/DL (ref 70–110)
METER GLUCOSE: 160 MG/DL (ref 70–110)
METER GLUCOSE: 214 MG/DL (ref 70–110)
METER GLUCOSE: 242 MG/DL (ref 70–110)
METER GLUCOSE: 255 MG/DL (ref 70–110)
METER GLUCOSE: 288 MG/DL (ref 70–110)
METER GLUCOSE: 296 MG/DL (ref 70–110)
METER GLUCOSE: 336 MG/DL (ref 70–110)
METER GLUCOSE: 476 MG/DL (ref 70–110)
METER GLUCOSE: 485 MG/DL (ref 70–110)
METER GLUCOSE: 60 MG/DL (ref 70–110)
MONOCYTES ABSOLUTE: 0.5 E9/L (ref 0.1–0.95)
MONOCYTES RELATIVE PERCENT: 6.2 % (ref 2–12)
NEUTROPHILS ABSOLUTE: 5.41 E9/L (ref 1.8–7.3)
NEUTROPHILS RELATIVE PERCENT: 66.8 % (ref 43–80)
NITRITE, URINE: NEGATIVE
PDW BLD-RTO: 12.3 FL (ref 11.5–15)
PH UA: 6 (ref 5–9)
PHOSPHORUS: 2.4 MG/DL (ref 2.5–4.5)
PHOSPHORUS: 2.7 MG/DL (ref 2.5–4.5)
PHOSPHORUS: 3 MG/DL (ref 2.5–4.5)
PLATELET # BLD: 320 E9/L (ref 130–450)
PMV BLD AUTO: 10.6 FL (ref 7–12)
POTASSIUM REFLEX MAGNESIUM: 3.9 MMOL/L (ref 3.5–5)
POTASSIUM SERPL-SCNC: 3.5 MMOL/L (ref 3.5–5)
POTASSIUM SERPL-SCNC: 4.7 MMOL/L (ref 3.5–5)
POTASSIUM SERPL-SCNC: 4.7 MMOL/L (ref 3.5–5)
PROTEIN UA: NEGATIVE MG/DL
RBC # BLD: 3.73 E12/L (ref 3.5–5.5)
RBC UA: ABNORMAL /HPF (ref 0–2)
SODIUM BLD-SCNC: 132 MMOL/L (ref 132–146)
SODIUM BLD-SCNC: 136 MMOL/L (ref 132–146)
SODIUM BLD-SCNC: 139 MMOL/L (ref 132–146)
SODIUM BLD-SCNC: 141 MMOL/L (ref 132–146)
SPECIFIC GRAVITY UA: 1.01 (ref 1–1.03)
TOTAL PROTEIN: 6.9 G/DL (ref 6.4–8.3)
UROBILINOGEN, URINE: 0.2 E.U./DL
WBC # BLD: 8.1 E9/L (ref 4.5–11.5)
WBC UA: ABNORMAL /HPF (ref 0–5)

## 2018-06-29 PROCEDURE — 6370000000 HC RX 637 (ALT 250 FOR IP): Performed by: EMERGENCY MEDICINE

## 2018-06-29 PROCEDURE — 85025 COMPLETE CBC W/AUTO DIFF WBC: CPT

## 2018-06-29 PROCEDURE — 96374 THER/PROPH/DIAG INJ IV PUSH: CPT

## 2018-06-29 PROCEDURE — 6370000000 HC RX 637 (ALT 250 FOR IP): Performed by: INTERNAL MEDICINE

## 2018-06-29 PROCEDURE — 80076 HEPATIC FUNCTION PANEL: CPT

## 2018-06-29 PROCEDURE — 6360000002 HC RX W HCPCS: Performed by: EMERGENCY MEDICINE

## 2018-06-29 PROCEDURE — 83735 ASSAY OF MAGNESIUM: CPT

## 2018-06-29 PROCEDURE — 99285 EMERGENCY DEPT VISIT HI MDM: CPT

## 2018-06-29 PROCEDURE — 83605 ASSAY OF LACTIC ACID: CPT

## 2018-06-29 PROCEDURE — 2500000003 HC RX 250 WO HCPCS: Performed by: NURSE PRACTITIONER

## 2018-06-29 PROCEDURE — 6360000002 HC RX W HCPCS: Performed by: NURSE PRACTITIONER

## 2018-06-29 PROCEDURE — 2580000003 HC RX 258: Performed by: NURSE PRACTITIONER

## 2018-06-29 PROCEDURE — 83036 HEMOGLOBIN GLYCOSYLATED A1C: CPT

## 2018-06-29 PROCEDURE — 1200000000 HC SEMI PRIVATE

## 2018-06-29 PROCEDURE — 82962 GLUCOSE BLOOD TEST: CPT

## 2018-06-29 PROCEDURE — 84100 ASSAY OF PHOSPHORUS: CPT

## 2018-06-29 PROCEDURE — 83690 ASSAY OF LIPASE: CPT

## 2018-06-29 PROCEDURE — 2580000003 HC RX 258: Performed by: EMERGENCY MEDICINE

## 2018-06-29 PROCEDURE — 96375 TX/PRO/DX INJ NEW DRUG ADDON: CPT

## 2018-06-29 PROCEDURE — 81001 URINALYSIS AUTO W/SCOPE: CPT

## 2018-06-29 PROCEDURE — 80048 BASIC METABOLIC PNL TOTAL CA: CPT

## 2018-06-29 PROCEDURE — 80053 COMPREHEN METABOLIC PANEL: CPT

## 2018-06-29 PROCEDURE — 36415 COLL VENOUS BLD VENIPUNCTURE: CPT

## 2018-06-29 RX ORDER — DEXTROSE MONOHYDRATE 25 G/50ML
12.5 INJECTION, SOLUTION INTRAVENOUS PRN
Status: DISCONTINUED | OUTPATIENT
Start: 2018-06-29 | End: 2018-06-29 | Stop reason: SDUPTHER

## 2018-06-29 RX ORDER — INSULIN GLARGINE 100 [IU]/ML
25 INJECTION, SOLUTION SUBCUTANEOUS NIGHTLY
Status: DISCONTINUED | OUTPATIENT
Start: 2018-06-29 | End: 2018-06-30 | Stop reason: HOSPADM

## 2018-06-29 RX ORDER — MORPHINE SULFATE 10 MG/ML
2 INJECTION, SOLUTION INTRAMUSCULAR; INTRAVENOUS ONCE
Status: COMPLETED | OUTPATIENT
Start: 2018-06-29 | End: 2018-06-29

## 2018-06-29 RX ORDER — METOCLOPRAMIDE HYDROCHLORIDE 5 MG/ML
10 INJECTION INTRAMUSCULAR; INTRAVENOUS ONCE
Status: COMPLETED | OUTPATIENT
Start: 2018-06-29 | End: 2018-06-29

## 2018-06-29 RX ORDER — DEXTROSE MONOHYDRATE 50 MG/ML
100 INJECTION, SOLUTION INTRAVENOUS PRN
Status: DISCONTINUED | OUTPATIENT
Start: 2018-06-29 | End: 2018-06-30 | Stop reason: HOSPADM

## 2018-06-29 RX ORDER — DEXTROSE AND SODIUM CHLORIDE 5; .45 G/100ML; G/100ML
INJECTION, SOLUTION INTRAVENOUS CONTINUOUS PRN
Status: DISCONTINUED | OUTPATIENT
Start: 2018-06-29 | End: 2018-06-29

## 2018-06-29 RX ORDER — 0.9 % SODIUM CHLORIDE 0.9 %
1000 INTRAVENOUS SOLUTION INTRAVENOUS ONCE
Status: COMPLETED | OUTPATIENT
Start: 2018-06-29 | End: 2018-06-29

## 2018-06-29 RX ORDER — SODIUM CHLORIDE 0.9 % (FLUSH) 0.9 %
10 SYRINGE (ML) INJECTION PRN
Status: DISCONTINUED | OUTPATIENT
Start: 2018-06-29 | End: 2018-06-30 | Stop reason: HOSPADM

## 2018-06-29 RX ORDER — NICOTINE POLACRILEX 4 MG
15 LOZENGE BUCCAL PRN
Status: DISCONTINUED | OUTPATIENT
Start: 2018-06-29 | End: 2018-06-29 | Stop reason: SDUPTHER

## 2018-06-29 RX ORDER — SODIUM CHLORIDE 0.9 % (FLUSH) 0.9 %
10 SYRINGE (ML) INJECTION EVERY 12 HOURS SCHEDULED
Status: DISCONTINUED | OUTPATIENT
Start: 2018-06-29 | End: 2018-06-30 | Stop reason: HOSPADM

## 2018-06-29 RX ORDER — SODIUM CHLORIDE 9 MG/ML
INJECTION, SOLUTION INTRAVENOUS CONTINUOUS
Status: DISCONTINUED | OUTPATIENT
Start: 2018-06-29 | End: 2018-06-29

## 2018-06-29 RX ORDER — POTASSIUM CHLORIDE 7.45 MG/ML
10 INJECTION INTRAVENOUS PRN
Status: DISCONTINUED | OUTPATIENT
Start: 2018-06-29 | End: 2018-06-30 | Stop reason: HOSPADM

## 2018-06-29 RX ORDER — MAGNESIUM SULFATE 1 G/100ML
1 INJECTION INTRAVENOUS PRN
Status: DISCONTINUED | OUTPATIENT
Start: 2018-06-29 | End: 2018-06-30 | Stop reason: HOSPADM

## 2018-06-29 RX ORDER — NICOTINE POLACRILEX 4 MG
15 LOZENGE BUCCAL PRN
Status: DISCONTINUED | OUTPATIENT
Start: 2018-06-29 | End: 2018-06-30 | Stop reason: HOSPADM

## 2018-06-29 RX ORDER — DEXTROSE MONOHYDRATE 25 G/50ML
12.5 INJECTION, SOLUTION INTRAVENOUS PRN
Status: DISCONTINUED | OUTPATIENT
Start: 2018-06-29 | End: 2018-06-30 | Stop reason: HOSPADM

## 2018-06-29 RX ORDER — ACETAMINOPHEN 325 MG/1
650 TABLET ORAL EVERY 4 HOURS PRN
Status: DISCONTINUED | OUTPATIENT
Start: 2018-06-29 | End: 2018-06-30 | Stop reason: HOSPADM

## 2018-06-29 RX ORDER — OXYCODONE HYDROCHLORIDE AND ACETAMINOPHEN 5; 325 MG/1; MG/1
1 TABLET ORAL EVERY 8 HOURS PRN
Status: DISCONTINUED | OUTPATIENT
Start: 2018-06-29 | End: 2018-06-30 | Stop reason: HOSPADM

## 2018-06-29 RX ADMIN — INSULIN LISPRO 5 UNITS: 100 INJECTION, SOLUTION INTRAVENOUS; SUBCUTANEOUS at 17:55

## 2018-06-29 RX ADMIN — SODIUM CHLORIDE 1000 ML: 9 INJECTION, SOLUTION INTRAVENOUS at 04:45

## 2018-06-29 RX ADMIN — OXYCODONE HYDROCHLORIDE AND ACETAMINOPHEN 1 TABLET: 5; 325 TABLET ORAL at 15:56

## 2018-06-29 RX ADMIN — Medication 10 ML: at 20:57

## 2018-06-29 RX ADMIN — MORPHINE SULFATE 2 MG: 10 INJECTION INTRAVENOUS at 04:46

## 2018-06-29 RX ADMIN — INSULIN LISPRO 1 UNITS: 100 INJECTION, SOLUTION INTRAVENOUS; SUBCUTANEOUS at 20:53

## 2018-06-29 RX ADMIN — INSULIN LISPRO 6 UNITS: 100 INJECTION, SOLUTION INTRAVENOUS; SUBCUTANEOUS at 17:56

## 2018-06-29 RX ADMIN — INSULIN LISPRO 7 UNITS: 100 INJECTION, SOLUTION INTRAVENOUS; SUBCUTANEOUS at 15:56

## 2018-06-29 RX ADMIN — INSULIN HUMAN 10 UNITS: 100 INJECTION, SOLUTION PARENTERAL at 04:45

## 2018-06-29 RX ADMIN — POTASSIUM CHLORIDE 10 MEQ: 10 INJECTION, SOLUTION INTRAVENOUS at 13:37

## 2018-06-29 RX ADMIN — POTASSIUM CHLORIDE 10 MEQ: 10 INJECTION, SOLUTION INTRAVENOUS at 22:00

## 2018-06-29 RX ADMIN — INSULIN GLARGINE 25 UNITS: 100 INJECTION, SOLUTION SUBCUTANEOUS at 22:00

## 2018-06-29 RX ADMIN — POTASSIUM CHLORIDE 10 MEQ: 10 INJECTION, SOLUTION INTRAVENOUS at 14:30

## 2018-06-29 RX ADMIN — POTASSIUM & SODIUM PHOSPHATES POWDER PACK 280-160-250 MG 250 MG: 280-160-250 PACK at 22:45

## 2018-06-29 RX ADMIN — DEXTROSE AND SODIUM CHLORIDE: 5; 450 INJECTION, SOLUTION INTRAVENOUS at 08:19

## 2018-06-29 RX ADMIN — POTASSIUM CHLORIDE 10 MEQ: 10 INJECTION, SOLUTION INTRAVENOUS at 15:20

## 2018-06-29 RX ADMIN — POTASSIUM CHLORIDE 10 MEQ: 10 INJECTION, SOLUTION INTRAVENOUS at 20:52

## 2018-06-29 RX ADMIN — SODIUM PHOSPHATE, MONOBASIC, MONOHYDRATE 10 MMOL: 276; 142 INJECTION, SOLUTION INTRAVENOUS at 14:30

## 2018-06-29 RX ADMIN — METOCLOPRAMIDE 10 MG: 5 INJECTION, SOLUTION INTRAMUSCULAR; INTRAVENOUS at 04:46

## 2018-06-29 RX ADMIN — DEXTROSE AND SODIUM CHLORIDE: 5; 450 INJECTION, SOLUTION INTRAVENOUS at 15:09

## 2018-06-29 RX ADMIN — SODIUM CHLORIDE 0.5 UNITS/HR: 9 INJECTION, SOLUTION INTRAVENOUS at 06:13

## 2018-06-29 RX ADMIN — Medication 10 ML: at 08:25

## 2018-06-29 ASSESSMENT — PAIN SCALES - GENERAL
PAINLEVEL_OUTOF10: 0
PAINLEVEL_OUTOF10: 7
PAINLEVEL_OUTOF10: 0
PAINLEVEL_OUTOF10: 8
PAINLEVEL_OUTOF10: 9
PAINLEVEL_OUTOF10: 9

## 2018-06-29 ASSESSMENT — PAIN DESCRIPTION - PAIN TYPE
TYPE: ACUTE PAIN
TYPE: ACUTE PAIN
TYPE: CHRONIC PAIN

## 2018-06-29 ASSESSMENT — PAIN DESCRIPTION - LOCATION
LOCATION: GENERALIZED
LOCATION: GENERALIZED

## 2018-06-29 ASSESSMENT — PAIN DESCRIPTION - DESCRIPTORS: DESCRIPTORS: ACHING

## 2018-06-29 ASSESSMENT — PAIN SCALES - WONG BAKER: WONGBAKER_NUMERICALRESPONSE: 0

## 2018-06-29 ASSESSMENT — PAIN DESCRIPTION - FREQUENCY: FREQUENCY: CONTINUOUS

## 2018-06-29 NOTE — ED PROVIDER NOTES
ICU    Impression: DKA, type 1    I have reviewed the patient's medications, vital signs, and diagnostic studies available to me in the medical record at the time of the patient encounter.         Mony Roth MD  06/29/18 8041       Mony Roth MD  07/30/18 9051

## 2018-06-29 NOTE — H&P
[facility] Hospitalist Group History and Physical      CHIEF COMPLAINT:  Weakness fatigue and nausea    History of Present Illness:  Patient presents to the emergency department with weakness fatigue and nausea is a frequent flier to the hospital for noncompliance with insulin regimen type I diabetes and DKA. She was found to be in DKA with a high anion gap. Insulin drip was initiated and the emergency department. Patient denies any chest pain shortness of breath or difficulty breathing. Denies any abdominal pain and reports that nausea is improved at this time. She denies any back pain fever or chills    Informant(s) for H&P: Patient and the ER physician    REVIEW OF SYSTEMS:  A comprehensive review of systems was negative except for: what is in the HPI      PMH:  Past Medical History:   Diagnosis Date    Chronic kidney disease     Depression     Diabetes mellitus ([facility] [address]     Diabetic ketoacidosis ([facility] [address] 2011    MDRO (multiple drug resistant organisms) resistance     MRSA (methicillin resistant Staphylococcus aureus)     back wound abcess    Non compliance w medication regimen 3/30/2016    Other disorders of kidney and ureter     Pregnancy 3/30/2016    16 weeks    Severe pre-eclampsia in third trimester 2016       Surgical History:  Past Surgical History:   Procedure Laterality Date    BACK SURGERY      abscess     SECTION      x2    COLONOSCOPY N/A 2018    COLONOSCOPY WITH BIOPSY performed by Mauricio Culver MD at [address]  2012    EF 57%    ECHO COMPL W DOP COLOR FLOW  6/10/2013         [place] ESOPHAGOGASTRODUODENOSCOPY TRANSORAL DIAGNOSTIC N/A 2018    EGD ESOPHAGOGASTRODUODENOSCOPY performed by Mauricio Culver MD at [id number] [facility] TUNNELED VENOUS PORT PLACEMENT  2018       Medications Prior to Admission:    Prior to Admission medications    Medication Sig Start Date End Date Taking?  Authorizing Provider   traMADol (ULTRAM) 50 MG tablet Take 1 tablet by mouth every 6 hours as needed for Pain for up to 5 days. Intended supply: 5 days. Take lowest dose possible to manage pain. 6/25/18 6/30/18  Jose L Blanco MD   insulin glargine (LANTUS) 100 UNIT/ML injection vial Inject 25 Units into the skin nightly    Historical Provider, MD   insulin lispro (HUMALOG) 100 UNIT/ML injection vial Inject 0-10 Units into the skin 3 times daily (with meals)     Historical Provider, MD   glucose (GLUTOSE) 40 % GEL Take 15 g by mouth as needed 4/1/16   Swapna Delacruz MD   glucose blood VI test strips (ASCENSIA AUTODISC VI;ONE TOUCH ULTRA TEST VI) strip Indications: 5x a day Freestyle Lite -Tests 5 times daily and as needed for low glucose. E10.10 4/1/16   Swapna Delacruz MD   LANCETS THIN by Does not apply route. Indications: cks 5xa a day    Mei Champagne MD   Insulin Syringe-Needle U-100 (INSULIN SYRINGE .5CC/30GX1/2\") 30G X 1/2\" 0.5 ML MISC by Does not apply route. Indications: uses 6-7 a day    Historical Provider, MD       Allergies:    Nicoderm [nicotine] and Toradol [ketorolac tromethamine]    Social History:    reports that she has been smoking Cigarettes. She has a 1.75 pack-year smoking history. She has never used smokeless tobacco. She reports that she does not drink alcohol or use drugs. Family History:   family history includes Asthma in her brother and mother; Diabetes in her mother; High Blood Pressure in her father and mother; Hypertension in her mother.        PHYSICAL EXAM:  Vitals:  /84   Pulse 113   Temp 98.4 °F (36.9 °C)   Resp 20   Ht 5' 4\" (1.626 m)   Wt 150 lb (68 kg)   LMP 06/22/2018   SpO2 100%   BMI 25.75 kg/m²     General Appearance: alert and oriented to person, place and time and in no acute distress  Skin: warm and dry  Head: normocephalic and atraumatic  Eyes: pupils equal, round, and reactive to light, extraocular eye movements intact, conjunctivae normal  Neck: neck supple and non tender without mass

## 2018-06-29 NOTE — PROGRESS NOTES
Spoke with dr. Daiana oMntana regarding latest blood glucose of 476 dr. Daiana Montana stated he'll place new orders

## 2018-06-29 NOTE — ED NOTES
Bed: 06  Expected date:   Expected time:   Means of arrival:   Comments:  Jovan Verdin RN  06/29/18 0009

## 2018-06-30 VITALS
HEART RATE: 98 BPM | RESPIRATION RATE: 18 BRPM | TEMPERATURE: 98 F | DIASTOLIC BLOOD PRESSURE: 70 MMHG | BODY MASS INDEX: 24.21 KG/M2 | WEIGHT: 141.8 LBS | HEIGHT: 64 IN | OXYGEN SATURATION: 96 % | SYSTOLIC BLOOD PRESSURE: 120 MMHG

## 2018-06-30 LAB
METER GLUCOSE: 135 MG/DL (ref 70–110)
METER GLUCOSE: 151 MG/DL (ref 70–110)
METER GLUCOSE: 259 MG/DL (ref 70–110)

## 2018-06-30 PROCEDURE — 82962 GLUCOSE BLOOD TEST: CPT

## 2018-06-30 PROCEDURE — 6370000000 HC RX 637 (ALT 250 FOR IP): Performed by: INTERNAL MEDICINE

## 2018-06-30 PROCEDURE — 6360000002 HC RX W HCPCS: Performed by: NURSE PRACTITIONER

## 2018-06-30 PROCEDURE — 2580000003 HC RX 258: Performed by: NURSE PRACTITIONER

## 2018-06-30 RX ADMIN — INSULIN LISPRO 3 UNITS: 100 INJECTION, SOLUTION INTRAVENOUS; SUBCUTANEOUS at 08:46

## 2018-06-30 RX ADMIN — ENOXAPARIN SODIUM 30 MG: 30 INJECTION SUBCUTANEOUS at 08:45

## 2018-06-30 RX ADMIN — Medication 10 ML: at 10:11

## 2018-06-30 RX ADMIN — OXYCODONE HYDROCHLORIDE AND ACETAMINOPHEN 1 TABLET: 5; 325 TABLET ORAL at 08:32

## 2018-06-30 RX ADMIN — INSULIN LISPRO 1 UNITS: 100 INJECTION, SOLUTION INTRAVENOUS; SUBCUTANEOUS at 12:12

## 2018-06-30 RX ADMIN — POTASSIUM & SODIUM PHOSPHATES POWDER PACK 280-160-250 MG 250 MG: 280-160-250 PACK at 08:45

## 2018-06-30 RX ADMIN — INSULIN LISPRO 5 UNITS: 100 INJECTION, SOLUTION INTRAVENOUS; SUBCUTANEOUS at 08:46

## 2018-06-30 RX ADMIN — INSULIN LISPRO 5 UNITS: 100 INJECTION, SOLUTION INTRAVENOUS; SUBCUTANEOUS at 12:12

## 2018-06-30 RX ADMIN — OXYCODONE HYDROCHLORIDE AND ACETAMINOPHEN 1 TABLET: 5; 325 TABLET ORAL at 00:22

## 2018-06-30 ASSESSMENT — PAIN DESCRIPTION - LOCATION
LOCATION: GENERALIZED
LOCATION: GENERALIZED

## 2018-06-30 ASSESSMENT — PAIN DESCRIPTION - PAIN TYPE
TYPE: CHRONIC PAIN
TYPE: CHRONIC PAIN

## 2018-06-30 ASSESSMENT — PAIN DESCRIPTION - DESCRIPTORS
DESCRIPTORS: ACHING
DESCRIPTORS: ACHING;DISCOMFORT

## 2018-06-30 ASSESSMENT — PAIN SCALES - GENERAL
PAINLEVEL_OUTOF10: 5
PAINLEVEL_OUTOF10: 6
PAINLEVEL_OUTOF10: 7

## 2018-06-30 ASSESSMENT — PAIN DESCRIPTION - ONSET
ONSET: ON-GOING
ONSET: GRADUAL

## 2018-06-30 ASSESSMENT — PAIN DESCRIPTION - FREQUENCY
FREQUENCY: INTERMITTENT
FREQUENCY: INTERMITTENT

## 2018-06-30 ASSESSMENT — PAIN DESCRIPTION - PROGRESSION: CLINICAL_PROGRESSION: NOT CHANGED

## 2018-06-30 NOTE — PLAN OF CARE
Problem: Pain:  Goal: Pain level will decrease  Pain level will decrease   Outcome: Met This Shift    Goal: Control of acute pain  Control of acute pain   Outcome: Met This Shift

## 2018-06-30 NOTE — DISCHARGE SUMMARY
pupils equal, round, and reactive to light,   Neck: neck supple and non tender   Pulmonary/Chest: clear to auscultation bilaterally, mediport  Cardiovascular: normal rate, normal S1 and S2,   Abdomen: soft, non-tender, non-distended, normal bowel sounds,   Extremities: no edema  Neurologic: Non focal     Vitals:    Vitals:    06/30/18 0022 06/30/18 0415 06/30/18 0825 06/30/18 0832   BP:   120/70    Pulse:   98    Resp:   18    Temp:   98 °F (36.7 °C)    TempSrc:   Oral    SpO2: 99%   96%   Weight:  141 lb 12.8 oz (64.3 kg)     Height:           I/O last 3 completed shifts: In: 2548 [P.O.:1200; I.V.:898; IV Piggyback:450]  Out: -   No intake/output data recorded. LABS:  Recent Labs      06/29/18   1247  06/29/18   1600  06/29/18   1955   NA  141  132  136   K  3.5  4.7  4.7   CL  106  98  101   CO2  23  20*  23   BUN  23*  22*  22*   CREATININE  0.9  0.9  0.9   GLUCOSE  136*  531*  198*   CALCIUM  8.1*  7.9*  8.5*       Recent Labs      06/29/18   0430   WBC  8.1   RBC  3.73   HGB  11.6   HCT  34.8   MCV  93.3   MCH  31.1   MCHC  33.3   RDW  12.3   PLT  320   MPV  10.6       Recent Labs      06/30/18   0610   GLUMET  259*       Imaging:    No orders to display         Follow up:  Lobo Jean-Baptiste MD  [address] [place] [address] [phone]    In 1 week  Call office for Follow up after hospitalization      Patient Instructions:    Activity level: as tolerated  Diet: diabetic diet  Labs:none    Dispo:home    Note that less than 30 minutes was spent in preparing discharge papers, discussing discharge with patient, medication review, etc.    Signed:  Electronically signed by HEBER Gutierrez CNP on 6/30/2018 at 9:55 AM

## 2018-07-17 ENCOUNTER — APPOINTMENT (OUTPATIENT)
Dept: GENERAL RADIOLOGY | Age: 28
DRG: 420 | End: 2018-07-17
Payer: COMMERCIAL

## 2018-07-17 ENCOUNTER — HOSPITAL ENCOUNTER (EMERGENCY)
Age: 28
Discharge: HOME | DRG: 420 | End: 2018-07-17
Attending: EMERGENCY MEDICINE
Payer: COMMERCIAL

## 2018-07-17 VITALS
BODY MASS INDEX: 24.41 KG/M2 | HEART RATE: 93 BPM | WEIGHT: 143 LBS | TEMPERATURE: 97.9 F | DIASTOLIC BLOOD PRESSURE: 95 MMHG | RESPIRATION RATE: 14 BRPM | HEIGHT: 64 IN | OXYGEN SATURATION: 99 % | SYSTOLIC BLOOD PRESSURE: 137 MMHG

## 2018-07-17 DIAGNOSIS — R31.9 URINARY TRACT INFECTION WITH HEMATURIA, SITE UNSPECIFIED: Primary | ICD-10-CM

## 2018-07-17 DIAGNOSIS — N39.0 URINARY TRACT INFECTION WITH HEMATURIA, SITE UNSPECIFIED: Primary | ICD-10-CM

## 2018-07-17 DIAGNOSIS — R73.9 HYPERGLYCEMIA: ICD-10-CM

## 2018-07-17 PROBLEM — I10 ESSENTIAL HYPERTENSION: Chronic | Status: ACTIVE | Noted: 2018-07-17

## 2018-07-17 LAB
ALBUMIN SERPL-MCNC: 3.5 G/DL (ref 3.5–5.2)
ALP BLD-CCNC: 145 U/L (ref 35–104)
ALT SERPL-CCNC: 12 U/L (ref 0–32)
ANION GAP SERPL CALCULATED.3IONS-SCNC: 11 MMOL/L (ref 7–16)
AST SERPL-CCNC: 13 U/L (ref 0–31)
BACTERIA: ABNORMAL /HPF
BASOPHILS ABSOLUTE: 0.1 E9/L (ref 0–0.2)
BASOPHILS RELATIVE PERCENT: 1.1 % (ref 0–2)
BETA-HYDROXYBUTYRATE: 0.59 MMOL/L (ref 0.02–0.27)
BILIRUB SERPL-MCNC: 0.3 MG/DL (ref 0–1.2)
BILIRUBIN URINE: NEGATIVE
BLOOD, URINE: ABNORMAL
BUN BLDV-MCNC: 30 MG/DL (ref 6–20)
CALCIUM SERPL-MCNC: 9.3 MG/DL (ref 8.6–10.2)
CHLORIDE BLD-SCNC: 98 MMOL/L (ref 98–107)
CHP ED QC CHECK: YES
CHP ED QC CHECK: YES
CLARITY: ABNORMAL
CO2: 27 MMOL/L (ref 22–29)
COLOR: YELLOW
CREAT SERPL-MCNC: 1.2 MG/DL (ref 0.5–1)
EKG ATRIAL RATE: 79 BPM
EKG P AXIS: 49 DEGREES
EKG P-R INTERVAL: 110 MS
EKG Q-T INTERVAL: 376 MS
EKG QRS DURATION: 94 MS
EKG QTC CALCULATION (BAZETT): 431 MS
EKG R AXIS: 60 DEGREES
EKG T AXIS: 42 DEGREES
EKG VENTRICULAR RATE: 79 BPM
EOSINOPHILS ABSOLUTE: 0.28 E9/L (ref 0.05–0.5)
EOSINOPHILS RELATIVE PERCENT: 3.1 % (ref 0–6)
EPITHELIAL CELLS, UA: ABNORMAL /HPF
GFR AFRICAN AMERICAN: >60
GFR NON-AFRICAN AMERICAN: >60 ML/MIN/1.73
GLUCOSE BLD-MCNC: 315 MG/DL (ref 74–109)
GLUCOSE BLD-MCNC: 321 MG/DL
GLUCOSE URINE: >=1000 MG/DL
HCT VFR BLD CALC: 31.1 % (ref 34–48)
HEMOGLOBIN: 10.7 G/DL (ref 11.5–15.5)
IMMATURE GRANULOCYTES #: 0.03 E9/L
IMMATURE GRANULOCYTES %: 0.3 % (ref 0–5)
KETONES, URINE: NEGATIVE MG/DL
LACTIC ACID: 1.1 MMOL/L (ref 0.5–2.2)
LEUKOCYTE ESTERASE, URINE: ABNORMAL
LIPASE: 15 U/L (ref 13–60)
LYMPHOCYTES ABSOLUTE: 3.03 E9/L (ref 1.5–4)
LYMPHOCYTES RELATIVE PERCENT: 33.1 % (ref 20–42)
MCH RBC QN AUTO: 30.9 PG (ref 26–35)
MCHC RBC AUTO-ENTMCNC: 34.4 % (ref 32–34.5)
MCV RBC AUTO: 89.9 FL (ref 80–99.9)
METER GLUCOSE: 332 MG/DL (ref 70–110)
MONOCYTES ABSOLUTE: 0.46 E9/L (ref 0.1–0.95)
MONOCYTES RELATIVE PERCENT: 5 % (ref 2–12)
NEUTROPHILS ABSOLUTE: 5.26 E9/L (ref 1.8–7.3)
NEUTROPHILS RELATIVE PERCENT: 57.4 % (ref 43–80)
NITRITE, URINE: POSITIVE
PDW BLD-RTO: 11.9 FL (ref 11.5–15)
PH UA: 5.5 (ref 5–9)
PH VENOUS: 7.43 (ref 7.3–7.42)
PLATELET # BLD: 315 E9/L (ref 130–450)
PMV BLD AUTO: 10.2 FL (ref 7–12)
POC ANION GAP: 16
POC BUN: 29
POC CHLORIDE: 96
POC CO2: 27
POC CREATININE: 1.2
POC POTASSIUM: 3
POC SODIUM: 135
POTASSIUM SERPL-SCNC: 3.5 MMOL/L (ref 3.5–5)
PREGNANCY TEST URINE, POC: NEGATIVE
PROTEIN UA: 100 MG/DL
RBC # BLD: 3.46 E12/L (ref 3.5–5.5)
RBC UA: ABNORMAL /HPF (ref 0–2)
SODIUM BLD-SCNC: 136 MMOL/L (ref 132–146)
SPECIFIC GRAVITY UA: >=1.03 (ref 1–1.03)
TOTAL PROTEIN: 6.5 G/DL (ref 6.4–8.3)
TROPONIN: <0.01 NG/ML (ref 0–0.03)
UROBILINOGEN, URINE: 0.2 E.U./DL
WBC # BLD: 9.2 E9/L (ref 4.5–11.5)
WBC UA: >20 /HPF (ref 0–5)

## 2018-07-17 PROCEDURE — 82010 KETONE BODYS QUAN: CPT

## 2018-07-17 PROCEDURE — 81001 URINALYSIS AUTO W/SCOPE: CPT

## 2018-07-17 PROCEDURE — 80053 COMPREHEN METABOLIC PANEL: CPT

## 2018-07-17 PROCEDURE — 96374 THER/PROPH/DIAG INJ IV PUSH: CPT

## 2018-07-17 PROCEDURE — 83605 ASSAY OF LACTIC ACID: CPT

## 2018-07-17 PROCEDURE — 6370000000 HC RX 637 (ALT 250 FOR IP)

## 2018-07-17 PROCEDURE — 2580000003 HC RX 258: Performed by: EMERGENCY MEDICINE

## 2018-07-17 PROCEDURE — 85025 COMPLETE CBC W/AUTO DIFF WBC: CPT

## 2018-07-17 PROCEDURE — 82800 BLOOD PH: CPT

## 2018-07-17 PROCEDURE — 83690 ASSAY OF LIPASE: CPT

## 2018-07-17 PROCEDURE — 36415 COLL VENOUS BLD VENIPUNCTURE: CPT

## 2018-07-17 PROCEDURE — 84484 ASSAY OF TROPONIN QUANT: CPT

## 2018-07-17 PROCEDURE — 99285 EMERGENCY DEPT VISIT HI MDM: CPT

## 2018-07-17 PROCEDURE — 6360000002 HC RX W HCPCS

## 2018-07-17 PROCEDURE — 82962 GLUCOSE BLOOD TEST: CPT

## 2018-07-17 PROCEDURE — 93005 ELECTROCARDIOGRAM TRACING: CPT | Performed by: EMERGENCY MEDICINE

## 2018-07-17 PROCEDURE — 71045 X-RAY EXAM CHEST 1 VIEW: CPT

## 2018-07-17 PROCEDURE — 6370000000 HC RX 637 (ALT 250 FOR IP): Performed by: EMERGENCY MEDICINE

## 2018-07-17 RX ORDER — CEFDINIR 300 MG/1
300 CAPSULE ORAL ONCE
Status: COMPLETED | OUTPATIENT
Start: 2018-07-17 | End: 2018-07-17

## 2018-07-17 RX ORDER — 0.9 % SODIUM CHLORIDE 0.9 %
15 INTRAVENOUS SOLUTION INTRAVENOUS ONCE
Status: CANCELLED | OUTPATIENT
Start: 2018-07-17 | End: 2018-07-17

## 2018-07-17 RX ORDER — POTASSIUM CHLORIDE 7.45 MG/ML
10 INJECTION INTRAVENOUS PRN
Status: CANCELLED | OUTPATIENT
Start: 2018-07-17

## 2018-07-17 RX ORDER — ACETAMINOPHEN 325 MG/1
650 TABLET ORAL ONCE
Status: COMPLETED | OUTPATIENT
Start: 2018-07-17 | End: 2018-07-17

## 2018-07-17 RX ORDER — DEXTROSE MONOHYDRATE 25 G/50ML
12.5 INJECTION, SOLUTION INTRAVENOUS PRN
Status: CANCELLED | OUTPATIENT
Start: 2018-07-17

## 2018-07-17 RX ORDER — 0.9 % SODIUM CHLORIDE 0.9 %
2000 INTRAVENOUS SOLUTION INTRAVENOUS ONCE
Status: COMPLETED | OUTPATIENT
Start: 2018-07-17 | End: 2018-07-17

## 2018-07-17 RX ORDER — MAGNESIUM SULFATE 1 G/100ML
1 INJECTION INTRAVENOUS PRN
Status: CANCELLED | OUTPATIENT
Start: 2018-07-17

## 2018-07-17 RX ORDER — HEPARIN SODIUM (PORCINE) LOCK FLUSH IV SOLN 100 UNIT/ML 100 UNIT/ML
SOLUTION INTRAVENOUS
Status: DISCONTINUED
Start: 2018-07-17 | End: 2018-07-17 | Stop reason: HOSPADM

## 2018-07-17 RX ORDER — ACETAMINOPHEN 325 MG/1
TABLET ORAL
Status: COMPLETED
Start: 2018-07-17 | End: 2018-07-17

## 2018-07-17 RX ORDER — DEXTROSE AND SODIUM CHLORIDE 5; .45 G/100ML; G/100ML
INJECTION, SOLUTION INTRAVENOUS CONTINUOUS PRN
Status: CANCELLED | OUTPATIENT
Start: 2018-07-17

## 2018-07-17 RX ORDER — CEFDINIR 300 MG/1
300 CAPSULE ORAL 2 TIMES DAILY
Qty: 20 CAPSULE | Refills: 0 | Status: SHIPPED | OUTPATIENT
Start: 2018-07-17 | End: 2018-07-27

## 2018-07-17 RX ORDER — ONDANSETRON 2 MG/ML
4 INJECTION INTRAMUSCULAR; INTRAVENOUS ONCE
Status: COMPLETED | OUTPATIENT
Start: 2018-07-17 | End: 2018-07-17

## 2018-07-17 RX ORDER — ONDANSETRON 2 MG/ML
INJECTION INTRAMUSCULAR; INTRAVENOUS
Status: COMPLETED
Start: 2018-07-17 | End: 2018-07-17

## 2018-07-17 RX ORDER — SODIUM CHLORIDE 9 MG/ML
INJECTION, SOLUTION INTRAVENOUS CONTINUOUS
Status: CANCELLED | OUTPATIENT
Start: 2018-07-17

## 2018-07-17 RX ADMIN — CEFDINIR 300 MG: 300 CAPSULE ORAL at 04:57

## 2018-07-17 RX ADMIN — ONDANSETRON 4 MG: 2 INJECTION INTRAMUSCULAR; INTRAVENOUS at 04:09

## 2018-07-17 RX ADMIN — ACETAMINOPHEN 650 MG: 325 TABLET ORAL at 04:33

## 2018-07-17 RX ADMIN — SODIUM CHLORIDE 2000 ML: 900 INJECTION, SOLUTION INTRAVENOUS at 03:31

## 2018-07-17 ASSESSMENT — ENCOUNTER SYMPTOMS
COUGH: 0
DIARRHEA: 0
ABDOMINAL PAIN: 1
CONSTIPATION: 0
NAUSEA: 1
VOMITING: 1
SHORTNESS OF BREATH: 1

## 2018-07-17 ASSESSMENT — PAIN DESCRIPTION - DESCRIPTORS
DESCRIPTORS: ACHING
DESCRIPTORS: HEADACHE

## 2018-07-17 ASSESSMENT — PAIN DESCRIPTION - PAIN TYPE
TYPE: ACUTE PAIN
TYPE: ACUTE PAIN

## 2018-07-17 ASSESSMENT — PAIN DESCRIPTION - LOCATION
LOCATION: HEAD
LOCATION: GENERALIZED

## 2018-07-17 ASSESSMENT — PAIN SCALES - GENERAL: PAINLEVEL_OUTOF10: 8

## 2018-07-17 NOTE — ED PROVIDER NOTES
Patient is a 77-year-old female presenting to the emergency department for high blood sugar, shortness of breath, and nausea. Patient is a type I diabetic who has had frequent bouts of DKA. She states that she has been taking her insulin as directed and checking her blood sugar frequently. It has been running high over the past few days but she has been able to keep it down around 350-400. She states that for the past 2 days she has felt flulike symptoms, shortness of breath, and nausea which she states she typically does experience when she goes into DKA. She denies any chest pain or palpitations. She denies any fever or chills. The history is provided by the patient. Review of Systems   Constitutional: Positive for fatigue. Negative for chills, diaphoresis and fever. Respiratory: Positive for shortness of breath. Negative for cough. Cardiovascular: Negative for chest pain. Gastrointestinal: Positive for abdominal pain, nausea and vomiting. Negative for constipation and diarrhea. Genitourinary: Negative for decreased urine volume, dysuria, frequency and hematuria. Musculoskeletal: Positive for myalgias. Skin: Negative for rash and wound. Neurological: Positive for light-headedness and headaches. Negative for dizziness, syncope and weakness. Physical Exam   Constitutional: She is oriented to person, place, and time. She appears well-developed and well-nourished. No distress. [employer] female laying in hospital bed, alert and interactive, in no acute distress but does appear comfortable, nondiaphoretic, nontoxic. HENT:   Head: Normocephalic and atraumatic. Mouth/Throat: No oropharyngeal exudate. Somewhat dry oral mucous membranes. Eyes: Conjunctivae and EOM are normal. Pupils are equal, round, and reactive to light. Neck: Normal range of motion. Neck supple. Cardiovascular: Normal rate, regular rhythm and normal heart sounds. Exam reveals no gallop and no friction rub. Blood, Urine SMALL (A) Negative    pH, UA 5.5 5.0 - 9.0    Protein,  (A) Negative mg/dL    Urobilinogen, Urine 0.2 <2.0 E.U./dL    Nitrite, Urine POSITIVE (A) Negative    Leukocyte Esterase, Urine SMALL (A) Negative    WBC, UA >20 0 - 5 /HPF    RBC, UA 0-1 0 - 2 /HPF    Epi Cells MANY /HPF    Bacteria, UA MANY (A) /HPF   POC Pregnancy Urine Qual   Result Value Ref Range    Preg Test, Ur negative     QC OK? yes    POCT chem basic w/ ICA   Result Value Ref Range    POC BUN 29     POC Chloride 96     POC Creatinine 1.2     POC Anion Gap 16     POC Glucose 321     POC Potassium 3     POC Sodium 135     POC CO2 27     QC OK? yes    POCT Glucose   Result Value Ref Range    Meter Glucose 332 (H) 70 - 110 mg/dL   EKG 12 Lead   Result Value Ref Range    Ventricular Rate 79 BPM    Atrial Rate 79 BPM    P-R Interval 110 ms    QRS Duration 94 ms    Q-T Interval 376 ms    QTc Calculation (Bazett) 431 ms    P Axis 49 degrees    R Axis 60 degrees    T Axis 42 degrees       Radiology:  XR CHEST PORTABLE    (Results Pending)       ------------------------- NURSING NOTES AND VITALS REVIEWED ---------------------------  Date / Time Roomed:  7/17/2018  2:50 AM  ED Bed Assignment:  13/13    The nursing notes within the ED encounter and vital signs as below have been reviewed. BP (!) 137/95   Pulse 93   Temp 97.9 °F (36.6 °C) (Oral)   Resp 14   Ht 5' 4\" (1.626 m)   Wt 143 lb (64.9 kg)   LMP 06/22/2018   SpO2 99%   BMI 24.55 kg/m²   Oxygen Saturation Interpretation: Normal      ------------------------------------------ PROGRESS NOTES ------------------------------------------  I have spoken with the patient and discussed todays results, in addition to providing specific details for the plan of care and counseling regarding the diagnosis and prognosis. Their questions are answered at this time and they are agreeable with the plan. I discussed at length with them reasons for immediate return here for re evaluation.  They unspecified. A diagnosis of Hyperglycemia was also pertinent to this visit.   Discharge Medication List as of 7/17/2018  4:59 AM      START taking these medications    Details   cefdinir (OMNICEF) 300 MG capsule Take 1 capsule by mouth 2 times daily for 10 days, Disp-20 capsule, R-0Print           DO Bernabe Patino DO  07/17/18 0600

## 2018-07-19 ENCOUNTER — HOSPITAL ENCOUNTER (INPATIENT)
Age: 28
LOS: 1 days | Discharge: HOME | DRG: 420 | End: 2018-07-20
Attending: EMERGENCY MEDICINE | Admitting: INTERNAL MEDICINE
Payer: COMMERCIAL

## 2018-07-19 ENCOUNTER — APPOINTMENT (OUTPATIENT)
Dept: GENERAL RADIOLOGY | Age: 28
DRG: 420 | End: 2018-07-19
Payer: COMMERCIAL

## 2018-07-19 DIAGNOSIS — E10.10 DIABETIC KETOACIDOSIS WITHOUT COMA ASSOCIATED WITH TYPE 1 DIABETES MELLITUS (HCC): Primary | ICD-10-CM

## 2018-07-19 PROBLEM — E87.1 HYPONATREMIA: Status: ACTIVE | Noted: 2018-07-19

## 2018-07-19 PROBLEM — E86.0 DEHYDRATION: Status: ACTIVE | Noted: 2018-07-19

## 2018-07-19 LAB
ALBUMIN SERPL-MCNC: 3.5 G/DL (ref 3.5–5.2)
ALP BLD-CCNC: 178 U/L (ref 35–104)
ALT SERPL-CCNC: 12 U/L (ref 0–32)
ANION GAP SERPL CALCULATED.3IONS-SCNC: 14 MMOL/L (ref 7–16)
ANION GAP SERPL CALCULATED.3IONS-SCNC: 8 MMOL/L (ref 7–16)
ANION GAP SERPL CALCULATED.3IONS-SCNC: 9 MMOL/L (ref 7–16)
AST SERPL-CCNC: 12 U/L (ref 0–31)
BACTERIA: ABNORMAL /HPF
BASOPHILS ABSOLUTE: 0.09 E9/L (ref 0–0.2)
BASOPHILS RELATIVE PERCENT: 1.7 % (ref 0–2)
BETA-HYDROXYBUTYRATE: 3.1 MMOL/L (ref 0.02–0.27)
BILIRUB SERPL-MCNC: <0.2 MG/DL (ref 0–1.2)
BILIRUBIN URINE: NEGATIVE
BLOOD, URINE: ABNORMAL
BUN BLDV-MCNC: 15 MG/DL (ref 6–20)
BUN BLDV-MCNC: 17 MG/DL (ref 6–20)
BUN BLDV-MCNC: 25 MG/DL (ref 6–20)
CALCIUM SERPL-MCNC: 7.5 MG/DL (ref 8.6–10.2)
CALCIUM SERPL-MCNC: 7.7 MG/DL (ref 8.6–10.2)
CALCIUM SERPL-MCNC: 9 MG/DL (ref 8.6–10.2)
CHLORIDE BLD-SCNC: 107 MMOL/L (ref 98–107)
CHLORIDE BLD-SCNC: 114 MMOL/L (ref 98–107)
CHLORIDE BLD-SCNC: 91 MMOL/L (ref 98–107)
CHP ED QC CHECK: NORMAL
CHP ED QC CHECK: NORMAL
CLARITY: CLEAR
CO2: 21 MMOL/L (ref 22–29)
CO2: 22 MMOL/L (ref 22–29)
CO2: 22 MMOL/L (ref 22–29)
COLOR: ABNORMAL
CREAT SERPL-MCNC: 0.7 MG/DL (ref 0.5–1)
CREAT SERPL-MCNC: 0.7 MG/DL (ref 0.5–1)
CREAT SERPL-MCNC: 0.9 MG/DL (ref 0.5–1)
EOSINOPHILS ABSOLUTE: 0.21 E9/L (ref 0.05–0.5)
EOSINOPHILS RELATIVE PERCENT: 4 % (ref 0–6)
GFR AFRICAN AMERICAN: >60
GFR NON-AFRICAN AMERICAN: >60 ML/MIN/1.73
GLUCOSE BLD-MCNC: 193 MG/DL (ref 74–109)
GLUCOSE BLD-MCNC: 274 MG/DL (ref 74–109)
GLUCOSE BLD-MCNC: 500 MG/DL
GLUCOSE BLD-MCNC: 653 MG/DL
GLUCOSE BLD-MCNC: 932 MG/DL (ref 74–109)
GLUCOSE BLD-MCNC: NORMAL MG/DL
GLUCOSE URINE: >=1000 MG/DL
HCT VFR BLD CALC: 34 % (ref 34–48)
HEMOGLOBIN: 11 G/DL (ref 11.5–15.5)
IMMATURE GRANULOCYTES #: 0.02 E9/L
IMMATURE GRANULOCYTES %: 0.4 % (ref 0–5)
KETONES, URINE: 15 MG/DL
LACTIC ACID: 1 MMOL/L (ref 0.5–2.2)
LEUKOCYTE ESTERASE, URINE: NEGATIVE
LIPASE: 20 U/L (ref 13–60)
LYMPHOCYTES ABSOLUTE: 1.41 E9/L (ref 1.5–4)
LYMPHOCYTES RELATIVE PERCENT: 26.7 % (ref 20–42)
MAGNESIUM: 1.6 MG/DL (ref 1.6–2.6)
MAGNESIUM: 2.1 MG/DL (ref 1.6–2.6)
MAGNESIUM: 2.4 MG/DL (ref 1.6–2.6)
MCH RBC QN AUTO: 30.6 PG (ref 26–35)
MCHC RBC AUTO-ENTMCNC: 32.4 % (ref 32–34.5)
MCV RBC AUTO: 94.7 FL (ref 80–99.9)
METER GLUCOSE: 198 MG/DL (ref 70–110)
METER GLUCOSE: 249 MG/DL (ref 70–110)
METER GLUCOSE: 311 MG/DL (ref 70–110)
METER GLUCOSE: 441 MG/DL (ref 70–110)
METER GLUCOSE: 79 MG/DL (ref 70–110)
METER GLUCOSE: 89 MG/DL (ref 70–110)
METER GLUCOSE: >500 MG/DL (ref 70–110)
MONOCYTES ABSOLUTE: 0.36 E9/L (ref 0.1–0.95)
MONOCYTES RELATIVE PERCENT: 6.8 % (ref 2–12)
NEUTROPHILS ABSOLUTE: 3.2 E9/L (ref 1.8–7.3)
NEUTROPHILS RELATIVE PERCENT: 60.4 % (ref 43–80)
NITRITE, URINE: NEGATIVE
PDW BLD-RTO: 12.5 FL (ref 11.5–15)
PH UA: 5.5 (ref 5–9)
PH VENOUS: 7.33 (ref 7.3–7.42)
PHOSPHORUS: 1.5 MG/DL (ref 2.5–4.5)
PHOSPHORUS: 3.9 MG/DL (ref 2.5–4.5)
PHOSPHORUS: 4 MG/DL (ref 2.5–4.5)
PLATELET # BLD: 271 E9/L (ref 130–450)
PMV BLD AUTO: 10.8 FL (ref 7–12)
POC ANION GAP: 18
POC ANION GAP: 18
POC BUN: 21
POC BUN: 26
POC CHLORIDE: 104
POC CHLORIDE: 93
POC CO2: 19
POC CO2: 22
POC CREATININE: NORMAL
POC CREATININE: NORMAL
POC POTASSIUM: NORMAL
POC POTASSIUM: NORMAL
POC SODIUM: 128
POC SODIUM: 135
POTASSIUM SERPL-SCNC: 3.4 MMOL/L (ref 3.5–5)
POTASSIUM SERPL-SCNC: 4.3 MMOL/L (ref 3.5–5)
POTASSIUM SERPL-SCNC: 4.9 MMOL/L (ref 3.5–5)
PREGNANCY TEST URINE, POC: NEGATIVE
PROTEIN UA: NEGATIVE MG/DL
RBC # BLD: 3.59 E12/L (ref 3.5–5.5)
RBC UA: ABNORMAL /HPF (ref 0–2)
SODIUM BLD-SCNC: 127 MMOL/L (ref 132–146)
SODIUM BLD-SCNC: 137 MMOL/L (ref 132–146)
SODIUM BLD-SCNC: 144 MMOL/L (ref 132–146)
SPECIFIC GRAVITY UA: <=1.005 (ref 1–1.03)
TOTAL PROTEIN: 6.7 G/DL (ref 6.4–8.3)
TROPONIN: <0.01 NG/ML (ref 0–0.03)
UROBILINOGEN, URINE: 0.2 E.U./DL
WBC # BLD: 5.3 E9/L (ref 4.5–11.5)
WBC UA: ABNORMAL /HPF (ref 0–5)

## 2018-07-19 PROCEDURE — 2580000003 HC RX 258: Performed by: NURSE PRACTITIONER

## 2018-07-19 PROCEDURE — 87081 CULTURE SCREEN ONLY: CPT

## 2018-07-19 PROCEDURE — 96375 TX/PRO/DX INJ NEW DRUG ADDON: CPT

## 2018-07-19 PROCEDURE — 87186 SC STD MICRODIL/AGAR DIL: CPT

## 2018-07-19 PROCEDURE — 36415 COLL VENOUS BLD VENIPUNCTURE: CPT

## 2018-07-19 PROCEDURE — 2580000003 HC RX 258: Performed by: EMERGENCY MEDICINE

## 2018-07-19 PROCEDURE — 82800 BLOOD PH: CPT

## 2018-07-19 PROCEDURE — 6370000000 HC RX 637 (ALT 250 FOR IP): Performed by: EMERGENCY MEDICINE

## 2018-07-19 PROCEDURE — 80048 BASIC METABOLIC PNL TOTAL CA: CPT

## 2018-07-19 PROCEDURE — 2500000003 HC RX 250 WO HCPCS: Performed by: NURSE PRACTITIONER

## 2018-07-19 PROCEDURE — 84484 ASSAY OF TROPONIN QUANT: CPT

## 2018-07-19 PROCEDURE — 82962 GLUCOSE BLOOD TEST: CPT

## 2018-07-19 PROCEDURE — 81001 URINALYSIS AUTO W/SCOPE: CPT

## 2018-07-19 PROCEDURE — 6360000002 HC RX W HCPCS: Performed by: NURSE PRACTITIONER

## 2018-07-19 PROCEDURE — 80053 COMPREHEN METABOLIC PANEL: CPT

## 2018-07-19 PROCEDURE — 71045 X-RAY EXAM CHEST 1 VIEW: CPT

## 2018-07-19 PROCEDURE — 36591 DRAW BLOOD OFF VENOUS DEVICE: CPT

## 2018-07-19 PROCEDURE — 85025 COMPLETE CBC W/AUTO DIFF WBC: CPT

## 2018-07-19 PROCEDURE — 6360000002 HC RX W HCPCS: Performed by: EMERGENCY MEDICINE

## 2018-07-19 PROCEDURE — 99285 EMERGENCY DEPT VISIT HI MDM: CPT

## 2018-07-19 PROCEDURE — 83735 ASSAY OF MAGNESIUM: CPT

## 2018-07-19 PROCEDURE — 6370000000 HC RX 637 (ALT 250 FOR IP): Performed by: INTERNAL MEDICINE

## 2018-07-19 PROCEDURE — 87040 BLOOD CULTURE FOR BACTERIA: CPT

## 2018-07-19 PROCEDURE — 83690 ASSAY OF LIPASE: CPT

## 2018-07-19 PROCEDURE — 83605 ASSAY OF LACTIC ACID: CPT

## 2018-07-19 PROCEDURE — 82010 KETONE BODYS QUAN: CPT

## 2018-07-19 PROCEDURE — 96374 THER/PROPH/DIAG INJ IV PUSH: CPT

## 2018-07-19 PROCEDURE — 2000000000 HC ICU R&B

## 2018-07-19 PROCEDURE — 87088 URINE BACTERIA CULTURE: CPT

## 2018-07-19 PROCEDURE — 84100 ASSAY OF PHOSPHORUS: CPT

## 2018-07-19 RX ORDER — DEXTROSE MONOHYDRATE 25 G/50ML
12.5 INJECTION, SOLUTION INTRAVENOUS PRN
Status: DISCONTINUED | OUTPATIENT
Start: 2018-07-19 | End: 2018-07-19 | Stop reason: SDUPTHER

## 2018-07-19 RX ORDER — 0.9 % SODIUM CHLORIDE 0.9 %
1000 INTRAVENOUS SOLUTION INTRAVENOUS ONCE
Status: COMPLETED | OUTPATIENT
Start: 2018-07-19 | End: 2018-07-19

## 2018-07-19 RX ORDER — SODIUM CHLORIDE 9 MG/ML
INJECTION, SOLUTION INTRAVENOUS CONTINUOUS
Status: DISCONTINUED | OUTPATIENT
Start: 2018-07-19 | End: 2018-07-19

## 2018-07-19 RX ORDER — POTASSIUM CHLORIDE 7.45 MG/ML
10 INJECTION INTRAVENOUS PRN
Status: DISCONTINUED | OUTPATIENT
Start: 2018-07-19 | End: 2018-07-19 | Stop reason: SDUPTHER

## 2018-07-19 RX ORDER — 0.9 % SODIUM CHLORIDE 0.9 %
1000 INTRAVENOUS SOLUTION INTRAVENOUS ONCE
Status: DISCONTINUED | OUTPATIENT
Start: 2018-07-19 | End: 2018-07-19

## 2018-07-19 RX ORDER — DEXTROSE AND SODIUM CHLORIDE 5; .45 G/100ML; G/100ML
INJECTION, SOLUTION INTRAVENOUS CONTINUOUS PRN
Status: DISCONTINUED | OUTPATIENT
Start: 2018-07-19 | End: 2018-07-19

## 2018-07-19 RX ORDER — NICOTINE POLACRILEX 4 MG
15 LOZENGE BUCCAL PRN
Status: DISCONTINUED | OUTPATIENT
Start: 2018-07-19 | End: 2018-07-20 | Stop reason: HOSPADM

## 2018-07-19 RX ORDER — DEXTROSE MONOHYDRATE 25 G/50ML
12.5 INJECTION, SOLUTION INTRAVENOUS PRN
Status: DISCONTINUED | OUTPATIENT
Start: 2018-07-19 | End: 2018-07-20 | Stop reason: HOSPADM

## 2018-07-19 RX ORDER — MORPHINE SULFATE 4 MG/ML
4 INJECTION, SOLUTION INTRAMUSCULAR; INTRAVENOUS ONCE
Status: COMPLETED | OUTPATIENT
Start: 2018-07-19 | End: 2018-07-19

## 2018-07-19 RX ORDER — NICOTINE POLACRILEX 4 MG
15 LOZENGE BUCCAL PRN
Status: DISCONTINUED | OUTPATIENT
Start: 2018-07-19 | End: 2018-07-19 | Stop reason: SDUPTHER

## 2018-07-19 RX ORDER — INSULIN GLARGINE 100 [IU]/ML
25 INJECTION, SOLUTION SUBCUTANEOUS NIGHTLY
Status: DISCONTINUED | OUTPATIENT
Start: 2018-07-19 | End: 2018-07-20 | Stop reason: HOSPADM

## 2018-07-19 RX ORDER — ONDANSETRON 2 MG/ML
4 INJECTION INTRAMUSCULAR; INTRAVENOUS ONCE
Status: COMPLETED | OUTPATIENT
Start: 2018-07-19 | End: 2018-07-19

## 2018-07-19 RX ORDER — POTASSIUM CHLORIDE 7.45 MG/ML
10 INJECTION INTRAVENOUS PRN
Status: DISCONTINUED | OUTPATIENT
Start: 2018-07-19 | End: 2018-07-20 | Stop reason: HOSPADM

## 2018-07-19 RX ORDER — OXYCODONE HYDROCHLORIDE AND ACETAMINOPHEN 5; 325 MG/1; MG/1
1 TABLET ORAL EVERY 6 HOURS PRN
Status: DISCONTINUED | OUTPATIENT
Start: 2018-07-19 | End: 2018-07-20 | Stop reason: HOSPADM

## 2018-07-19 RX ORDER — SODIUM CHLORIDE 9 MG/ML
INJECTION, SOLUTION INTRAVENOUS CONTINUOUS
Status: DISCONTINUED | OUTPATIENT
Start: 2018-07-19 | End: 2018-07-19 | Stop reason: SDUPTHER

## 2018-07-19 RX ORDER — SODIUM CHLORIDE 450 MG/100ML
INJECTION, SOLUTION INTRAVENOUS CONTINUOUS
Status: DISCONTINUED | OUTPATIENT
Start: 2018-07-19 | End: 2018-07-20 | Stop reason: HOSPADM

## 2018-07-19 RX ORDER — DEXTROSE MONOHYDRATE 50 MG/ML
100 INJECTION, SOLUTION INTRAVENOUS PRN
Status: DISCONTINUED | OUTPATIENT
Start: 2018-07-19 | End: 2018-07-19 | Stop reason: SDUPTHER

## 2018-07-19 RX ORDER — MAGNESIUM SULFATE 1 G/100ML
1 INJECTION INTRAVENOUS PRN
Status: DISCONTINUED | OUTPATIENT
Start: 2018-07-19 | End: 2018-07-20 | Stop reason: HOSPADM

## 2018-07-19 RX ORDER — DEXTROSE AND SODIUM CHLORIDE 5; .45 G/100ML; G/100ML
INJECTION, SOLUTION INTRAVENOUS CONTINUOUS PRN
Status: DISCONTINUED | OUTPATIENT
Start: 2018-07-19 | End: 2018-07-19 | Stop reason: SDUPTHER

## 2018-07-19 RX ORDER — DEXTROSE MONOHYDRATE 50 MG/ML
100 INJECTION, SOLUTION INTRAVENOUS PRN
Status: DISCONTINUED | OUTPATIENT
Start: 2018-07-19 | End: 2018-07-20 | Stop reason: HOSPADM

## 2018-07-19 RX ORDER — MAGNESIUM SULFATE 1 G/100ML
1 INJECTION INTRAVENOUS PRN
Status: DISCONTINUED | OUTPATIENT
Start: 2018-07-19 | End: 2018-07-19 | Stop reason: SDUPTHER

## 2018-07-19 RX ADMIN — CEFTRIAXONE 1 G: 1 INJECTION, POWDER, FOR SOLUTION INTRAMUSCULAR; INTRAVENOUS at 21:27

## 2018-07-19 RX ADMIN — SODIUM CHLORIDE 0.05 UNITS/KG/HR: 9 INJECTION, SOLUTION INTRAVENOUS at 13:33

## 2018-07-19 RX ADMIN — POTASSIUM CHLORIDE 10 MEQ: 7.46 INJECTION, SOLUTION INTRAVENOUS at 17:28

## 2018-07-19 RX ADMIN — POTASSIUM CHLORIDE 10 MEQ: 7.46 INJECTION, SOLUTION INTRAVENOUS at 18:34

## 2018-07-19 RX ADMIN — SODIUM CHLORIDE 1000 ML: 9 INJECTION, SOLUTION INTRAVENOUS at 13:22

## 2018-07-19 RX ADMIN — OXYCODONE HYDROCHLORIDE AND ACETAMINOPHEN 1 TABLET: 5; 325 TABLET ORAL at 19:34

## 2018-07-19 RX ADMIN — MORPHINE SULFATE 4 MG: 4 INJECTION, SOLUTION INTRAMUSCULAR; INTRAVENOUS at 12:25

## 2018-07-19 RX ADMIN — SODIUM PHOSPHATE, MONOBASIC, MONOHYDRATE 15 MMOL: 276; 142 INJECTION, SOLUTION INTRAVENOUS at 17:28

## 2018-07-19 RX ADMIN — SODIUM CHLORIDE 1000 ML: 9 INJECTION, SOLUTION INTRAVENOUS at 10:30

## 2018-07-19 RX ADMIN — INSULIN GLARGINE 25 UNITS: 100 INJECTION, SOLUTION SUBCUTANEOUS at 20:35

## 2018-07-19 RX ADMIN — POTASSIUM CHLORIDE 10 MEQ: 7.46 INJECTION, SOLUTION INTRAVENOUS at 19:49

## 2018-07-19 RX ADMIN — INSULIN LISPRO 5 UNITS: 100 INJECTION, SOLUTION INTRAVENOUS; SUBCUTANEOUS at 20:34

## 2018-07-19 RX ADMIN — ONDANSETRON 4 MG: 2 INJECTION, SOLUTION INTRAMUSCULAR; INTRAVENOUS at 12:24

## 2018-07-19 RX ADMIN — SODIUM CHLORIDE: 4.5 INJECTION, SOLUTION INTRAVENOUS at 21:27

## 2018-07-19 RX ADMIN — MAGNESIUM SULFATE HEPTAHYDRATE 1 G: 1 INJECTION, SOLUTION INTRAVENOUS at 17:28

## 2018-07-19 RX ADMIN — MAGNESIUM SULFATE HEPTAHYDRATE 1 G: 1 INJECTION, SOLUTION INTRAVENOUS at 18:34

## 2018-07-19 RX ADMIN — DEXTROSE AND SODIUM CHLORIDE: 5; 450 INJECTION, SOLUTION INTRAVENOUS at 16:18

## 2018-07-19 RX ADMIN — SODIUM CHLORIDE 1000 ML: 9 INJECTION, SOLUTION INTRAVENOUS at 11:11

## 2018-07-19 RX ADMIN — SODIUM CHLORIDE: 9 INJECTION, SOLUTION INTRAVENOUS at 14:37

## 2018-07-19 ASSESSMENT — PAIN SCALES - GENERAL
PAINLEVEL_OUTOF10: 0
PAINLEVEL_OUTOF10: 0
PAINLEVEL_OUTOF10: 9
PAINLEVEL_OUTOF10: 10
PAINLEVEL_OUTOF10: 9
PAINLEVEL_OUTOF10: 8

## 2018-07-19 ASSESSMENT — ENCOUNTER SYMPTOMS
EYE REDNESS: 0
ABDOMINAL PAIN: 0
NAUSEA: 1
SHORTNESS OF BREATH: 1
VOMITING: 0

## 2018-07-19 ASSESSMENT — PAIN DESCRIPTION - LOCATION: LOCATION: GENERALIZED

## 2018-07-19 ASSESSMENT — PAIN DESCRIPTION - PAIN TYPE
TYPE: ACUTE PAIN
TYPE: ACUTE PAIN
TYPE: CHRONIC PAIN

## 2018-07-19 ASSESSMENT — PAIN DESCRIPTION - DESCRIPTORS
DESCRIPTORS: ACHING;DISCOMFORT
DESCRIPTORS: ACHING

## 2018-07-19 ASSESSMENT — PAIN SCALES - WONG BAKER: WONGBAKER_NUMERICALRESPONSE: 8;10

## 2018-07-19 NOTE — ED NOTES
Patients mediport was accessed with a 1'' power port needle. Blood was  returned and 20cc was wasted prior to the lab draw.      Shruthi Llanes RN  07/19/18 5910

## 2018-07-19 NOTE — ED PROVIDER NOTES
Negative for redness. Respiratory: Positive for shortness of breath. Cardiovascular: Negative for chest pain. Gastrointestinal: Positive for nausea. Negative for abdominal pain and vomiting. Genitourinary: Positive for dysuria. Musculoskeletal: Negative for arthralgias. Skin: Negative for rash. Neurological: Negative for light-headedness. Psychiatric/Behavioral: Negative for confusion. Physical Exam   Constitutional: She is oriented to person, place, and time. She appears well-developed and well-nourished. Non-toxic appearance. No distress. HENT:   Head: Normocephalic and atraumatic. Right Ear: Hearing and external ear normal.   Left Ear: Hearing and external ear normal.   Nose: Nose normal.   Mouth/Throat: Mucous membranes are normal.   Eyes: Conjunctivae are normal. No scleral icterus. Cardiovascular: Regular rhythm, S1 normal, S2 normal, normal heart sounds and normal pulses. Tachycardia present. Pulmonary/Chest: Effort normal and breath sounds normal. No accessory muscle usage. No respiratory distress. She has no decreased breath sounds. She has no wheezes. She has no rhonchi. She has no rales. Abdominal: Soft. There is no tenderness. There is no rigidity, no rebound and no guarding. Musculoskeletal: Normal range of motion. Neurological: She is alert and oriented to person, place, and time. GCS eye subscore is 4. GCS verbal subscore is 5. GCS motor subscore is 6. Skin: Skin is warm and dry. She is not diaphoretic. Psychiatric: She has a normal mood and affect. Procedures    MDM    ED Course as of Jul 19 1306   Thu Jul 19, 2018   1109 Patient's blood glucose is over 900. Her anion gap is only 18 and bicarb is 22. She'll receive 3 L of saline before reevaluating her sugar.  [RM]   1114 Patient sitting in the bed in no acute distress. Patient is complaining of diffuse body pain. Will be medicated at this time.   [MT]   9086 Patient sitting in the bed in no acute (2012); ECHO Compl W Dop Color Flow (6/10/2013);  section; Tunneled venous port placement (2018); pr esophagogastroduodenoscopy transoral diagnostic (N/A, 2018); back surgery; and Colonoscopy (N/A, 2018). Social History:  reports that she has been smoking Cigarettes. She has a 1.50 pack-year smoking history. She has never used smokeless tobacco. She reports that she does not drink alcohol or use drugs. Family History: family history includes Asthma in her brother and mother; Diabetes in her mother; High Blood Pressure in her father and mother; Hypertension in her mother. The patients home medications have been reviewed.     Allergies: Nicoderm [nicotine] and Toradol [ketorolac tromethamine]    -------------------------------------------------- RESULTS -------------------------------------------------    LABS:  Results for orders placed or performed during the hospital encounter of 18   Beta-Hydroxybutyrate   Result Value Ref Range    Beta-Hydroxybutyrate 3.10 (H) 0.02 - 0.27 mmol/L   Comprehensive Metabolic Panel   Result Value Ref Range    Sodium 127 (L) 132 - 146 mmol/L    Potassium 4.9 3.5 - 5.0 mmol/L    Chloride 91 (L) 98 - 107 mmol/L    CO2 22 22 - 29 mmol/L    Anion Gap 14 7 - 16 mmol/L    Glucose 932 (HH) 74 - 109 mg/dL    BUN 25 (H) 6 - 20 mg/dL    CREATININE 0.9 0.5 - 1.0 mg/dL    GFR Non-African American >60 >=60 mL/min/1.73    GFR African American >60     Calcium 9.0 8.6 - 10.2 mg/dL    Total Protein 6.7 6.4 - 8.3 g/dL    Alb 3.5 3.5 - 5.2 g/dL    Total Bilirubin <0.2 0.0 - 1.2 mg/dL    Alkaline Phosphatase 178 (H) 35 - 104 U/L    ALT 12 0 - 32 U/L    AST 12 0 - 31 U/L   Magnesium   Result Value Ref Range    Magnesium 2.1 1.6 - 2.6 mg/dL   Phosphorus   Result Value Ref Range    Phosphorus 4.0 2.5 - 4.5 mg/dL   Lipase   Result Value Ref Range    Lipase 20 13 - 60 U/L   Lactic acid, plasma   Result Value Ref Range    Lactic Acid 1.0 0.5 - 2.2 mmol/L   Troponin >500 (H) 70 - 110 mg/dL   POCT chem basic w/ ICA   Result Value Ref Range    POC BUN 21     POC Chloride 104     POC Creatinine      POC Anion Gap 18     POC Glucose 653     POC Potassium      POC Sodium 135     POC CO2 19    EKG 12 Lead   Result Value Ref Range    Ventricular Rate 92 BPM    Atrial Rate 92 BPM    P-R Interval 116 ms    QRS Duration 92 ms    Q-T Interval 350 ms    QTc Calculation (Bazett) 432 ms    P Axis 49 degrees    R Axis 58 degrees    T Axis 41 degrees       RADIOLOGY:  XR CHEST PORTABLE   Final Result   No acute interval change. EKG: This EKG is signed and interpreted by me. Rate: 92  Rhythm: Normal sinus rhythm  Interpretation: Normal sensation with no acute ischemic changes. Comparison: stable as compared to patient's most recent EKG      ------------------------- NURSING NOTES AND VITALS REVIEWED ---------------------------  Date / Time Roomed:  7/19/2018  9:22 AM  ED Bed Assignment:  03/03    The nursing notes within the ED encounter and vital signs as below have been reviewed. Patient Vitals for the past 24 hrs:   BP Temp Temp src Pulse Resp SpO2 Height Weight   07/19/18 1213 (!) 139/90 98 °F (36.7 °C) Oral 100 22 99 % - -   07/19/18 0918 (!) 147/90 97.9 °F (36.6 °C) Oral 110 22 98 % 5' 4\" (1.626 m) 134 lb 6 oz (61 kg)       Oxygen Saturation Interpretation: Normal    ------------------------------------------ PROGRESS NOTES ------------------------------------------  Re-evaluation(s):  See ED course    Counseling:  I have spoken with the patient and discussed todays results, in addition to providing specific details for the plan of care and counseling regarding the diagnosis and prognosis. Their questions are answered at this time and they are agreeable with the plan of admission.    --------------------------------- ADDITIONAL PROVIDER NOTES ---------------------------------  Consultations:  Time: 1304. Spoke with Dr. Nettie Joel  Discussed case.   They will admit the patient. This patient's ED course included: a personal history and physicial examination, re-evaluation prior to disposition, multiple bedside re-evaluations, IV medications, cardiac monitoring, continuous pulse oximetry and complex medical decision making and emergency management    This patient has remained hemodynamically stable during their ED course. Diagnosis:  1. Diabetic ketoacidosis without coma associated with type 1 diabetes mellitus ([facility] [address]        Disposition:  Patient's disposition: Admit to CCU/ICU  Patient's condition is stable.          Luis M Stack DO  Resident  07/19/18 5604

## 2018-07-19 NOTE — PROGRESS NOTES
Patient arrives to [facility] via monitored bed accompanied by RN. Patient ambulated without difficulty to bed from cart in the hallway. Placed on our monitor at this time, please see flowsheets for vital signs. 1515- Patient resting comfortably in bed at this time without complaint of discomfort. Will continue to monitor closely.

## 2018-07-19 NOTE — H&P
Pulse 100   Temp 98 °F (36.7 °C) (Oral)   Resp 22   Ht 5' 4\" (1.626 m)   Wt 134 lb 6 oz (61 kg)   LMP 06/22/2018   SpO2 99%   BMI 23.07 kg/m²     General Appearance: alert and oriented to person, place and time and in no acute distress  Skin: warm and dry  Head: normocephalic and atraumatic  Eyes: pupils equal, round, and reactive to light, extraocular eye movements intact, conjunctivae normal  Neck: neck supple and non tender without mass   Pulmonary/Chest: clear to auscultation bilaterally- no wheezes, rales or rhonchi, normal air movement, no respiratory distress  Cardiovascular: normal rate, normal S1 and S2 and no carotid bruits  Abdomen: soft, Mildly tender to palpation, non-distended, normal bowel sounds, no masses or organomegaly  Extremities: no cyanosis, no clubbing and no edema  Neurologic: no cranial nerve deficit and speech normal        LABS:  Recent Labs      07/17/18   0312  07/17/18   0320  07/19/18   0947  07/19/18   1020   NA  136   --    --   127*   K  3.5   --    --   4.9   CL  98   --    --   91*   CO2  27   --    --   22   BUN  30*   --    --   25*   CREATININE  1.2*  1.2   --   0.9   GLUCOSE  315*   --   500  932*   CALCIUM  9.3   --    --   9.0       Recent Labs      07/17/18   0312  07/19/18   1020   WBC  9.2  5.3   RBC  3.46*  3.59   HGB  10.7*  11.0*   HCT  31.1*  34.0   MCV  89.9  94.7   MCH  30.9  30.6   MCHC  34.4  32.4   RDW  11.9  12.5   PLT  315  271   MPV  10.2  10.8       Recent Labs      07/17/18   0320  07/19/18   1254   POCGLU  321  653           Radiology: Xr Chest Portable    Result Date: 7/19/2018  LOCATION: [facility] EXAM: XR CHEST PORTABLE COMPARISON: 7/17/2018 HISTORY: Shortness of breath TECHNIQUE: Single frontal view of the chest was obtained. FINDINGS:  SUPPORT DEVICES: Mediport catheter projects in satisfactory position and is unchanged. LUNGS: Clear with no areas of consolidation. PLEURA: No effusions or pneumothorax. LUNG VOLUMES: Satisfactory inspirator effort. MEDIASTINAL STRUCTURES: No lymphadenopathy. Normal aortic contour. HEART SIZE: Normal. BONES AND SOFT TISSUES: No fracture or soft tissue abnormality. No acute interval change. EKG: NAD    ASSESSMENT:      Principal Problem:    DKA, type 1, not at goal [facility])  Active Problems:    Diabetes mellitus type 1, uncontrolled ([facility] [address]    Dehydration    Hyponatremia  Resolved Problems:    * No resolved hospital problems. *      PLAN:    1. Patient be transferred to intensive care unit will continue on insulin drip with insulin drip protocol  2. Will restart subcutaneous insulin after insulin drip is discontinued  3. We'll continue to rehydrate patient, patient also placed on electrolyte replacement  standing order sets  4. Rocephin, Urine Cx pending. Code Status: Full  DVT prophylaxis: lovenox      NOTE: This report was transcribed using voice recognition software. Every effort was made to ensure accuracy; however, inadvertent computerized transcription errors may be present.   Electronically signed by HEBER Ramos CNP on 7/19/2018 at 1:59 PM

## 2018-07-19 NOTE — CARE COORDINATION
Discharge Planning:    Met with pt in the ED regarding discharge planning. Pt lives with her children. DME pt owns is a glucometer. Pt has never received HHC or been to SNF. Pt is independent at home and does not drive. Pt receives assistance from her family. PCP is Dr. Madeline Soni. Pharmacy pt prefers is [facility] on [address]. Pt relays no concerns for returning home at this time. SW will follow.     Electronically signed by HANNAH Bui on 7/19/2018 at 11:59 AM

## 2018-07-20 VITALS
WEIGHT: 143 LBS | BODY MASS INDEX: 24.41 KG/M2 | DIASTOLIC BLOOD PRESSURE: 86 MMHG | SYSTOLIC BLOOD PRESSURE: 121 MMHG | HEIGHT: 64 IN | OXYGEN SATURATION: 99 % | TEMPERATURE: 98.6 F | RESPIRATION RATE: 16 BRPM | HEART RATE: 87 BPM

## 2018-07-20 LAB
ANION GAP SERPL CALCULATED.3IONS-SCNC: 8 MMOL/L (ref 7–16)
BASOPHILS ABSOLUTE: 0.07 E9/L (ref 0–0.2)
BASOPHILS RELATIVE PERCENT: 0.9 % (ref 0–2)
BUN BLDV-MCNC: 14 MG/DL (ref 6–20)
CALCIUM SERPL-MCNC: 8.1 MG/DL (ref 8.6–10.2)
CHLORIDE BLD-SCNC: 105 MMOL/L (ref 98–107)
CO2: 21 MMOL/L (ref 22–29)
CREAT SERPL-MCNC: 0.7 MG/DL (ref 0.5–1)
EOSINOPHILS ABSOLUTE: 0.24 E9/L (ref 0.05–0.5)
EOSINOPHILS RELATIVE PERCENT: 3.2 % (ref 0–6)
GFR AFRICAN AMERICAN: >60
GFR NON-AFRICAN AMERICAN: >60 ML/MIN/1.73
GLUCOSE BLD-MCNC: 96 MG/DL (ref 74–109)
HCT VFR BLD CALC: 27.1 % (ref 34–48)
HEMOGLOBIN: 9.2 G/DL (ref 11.5–15.5)
IMMATURE GRANULOCYTES #: 0.03 E9/L
IMMATURE GRANULOCYTES %: 0.4 % (ref 0–5)
LYMPHOCYTES ABSOLUTE: 3.07 E9/L (ref 1.5–4)
LYMPHOCYTES RELATIVE PERCENT: 41.2 % (ref 20–42)
MAGNESIUM: 2.4 MG/DL (ref 1.6–2.6)
MCH RBC QN AUTO: 31.2 PG (ref 26–35)
MCHC RBC AUTO-ENTMCNC: 33.9 % (ref 32–34.5)
MCV RBC AUTO: 91.9 FL (ref 80–99.9)
METER GLUCOSE: 120 MG/DL (ref 70–110)
METER GLUCOSE: 65 MG/DL (ref 70–110)
MONOCYTES ABSOLUTE: 0.4 E9/L (ref 0.1–0.95)
MONOCYTES RELATIVE PERCENT: 5.4 % (ref 2–12)
NEUTROPHILS ABSOLUTE: 3.64 E9/L (ref 1.8–7.3)
NEUTROPHILS RELATIVE PERCENT: 48.9 % (ref 43–80)
PDW BLD-RTO: 12.3 FL (ref 11.5–15)
PHOSPHORUS: 2.3 MG/DL (ref 2.5–4.5)
PLATELET # BLD: 238 E9/L (ref 130–450)
PMV BLD AUTO: 10.2 FL (ref 7–12)
POTASSIUM SERPL-SCNC: 4.3 MMOL/L (ref 3.5–5)
RBC # BLD: 2.95 E12/L (ref 3.5–5.5)
SODIUM BLD-SCNC: 134 MMOL/L (ref 132–146)
WBC # BLD: 7.5 E9/L (ref 4.5–11.5)

## 2018-07-20 PROCEDURE — 6370000000 HC RX 637 (ALT 250 FOR IP): Performed by: INTERNAL MEDICINE

## 2018-07-20 PROCEDURE — 82962 GLUCOSE BLOOD TEST: CPT

## 2018-07-20 PROCEDURE — 2700000000 HC OXYGEN THERAPY PER DAY

## 2018-07-20 PROCEDURE — 2500000003 HC RX 250 WO HCPCS: Performed by: NURSE PRACTITIONER

## 2018-07-20 PROCEDURE — 36591 DRAW BLOOD OFF VENOUS DEVICE: CPT

## 2018-07-20 PROCEDURE — 85025 COMPLETE CBC W/AUTO DIFF WBC: CPT

## 2018-07-20 PROCEDURE — 80048 BASIC METABOLIC PNL TOTAL CA: CPT

## 2018-07-20 PROCEDURE — 84100 ASSAY OF PHOSPHORUS: CPT

## 2018-07-20 PROCEDURE — 2580000003 HC RX 258: Performed by: NURSE PRACTITIONER

## 2018-07-20 PROCEDURE — 83735 ASSAY OF MAGNESIUM: CPT

## 2018-07-20 PROCEDURE — 36415 COLL VENOUS BLD VENIPUNCTURE: CPT

## 2018-07-20 RX ADMIN — OXYCODONE HYDROCHLORIDE AND ACETAMINOPHEN 1 TABLET: 5; 325 TABLET ORAL at 03:40

## 2018-07-20 RX ADMIN — INSULIN LISPRO 5 UNITS: 100 INJECTION, SOLUTION INTRAVENOUS; SUBCUTANEOUS at 18:02

## 2018-07-20 RX ADMIN — SODIUM CHLORIDE: 4.5 INJECTION, SOLUTION INTRAVENOUS at 03:44

## 2018-07-20 RX ADMIN — OXYCODONE HYDROCHLORIDE AND ACETAMINOPHEN 1 TABLET: 5; 325 TABLET ORAL at 16:08

## 2018-07-20 RX ADMIN — INSULIN LISPRO 5 UNITS: 100 INJECTION, SOLUTION INTRAVENOUS; SUBCUTANEOUS at 08:58

## 2018-07-20 RX ADMIN — SODIUM PHOSPHATE, MONOBASIC, MONOHYDRATE 10 MMOL: 276; 142 INJECTION, SOLUTION INTRAVENOUS at 08:34

## 2018-07-20 RX ADMIN — OXYCODONE HYDROCHLORIDE AND ACETAMINOPHEN 1 TABLET: 5; 325 TABLET ORAL at 09:45

## 2018-07-20 RX ADMIN — INSULIN LISPRO 5 UNITS: 100 INJECTION, SOLUTION INTRAVENOUS; SUBCUTANEOUS at 12:58

## 2018-07-20 ASSESSMENT — PAIN DESCRIPTION - DESCRIPTORS: DESCRIPTORS: ACHING;DISCOMFORT;DULL

## 2018-07-20 ASSESSMENT — PAIN SCALES - GENERAL
PAINLEVEL_OUTOF10: 6
PAINLEVEL_OUTOF10: 4
PAINLEVEL_OUTOF10: 9
PAINLEVEL_OUTOF10: 7

## 2018-07-20 ASSESSMENT — PAIN DESCRIPTION - ORIENTATION: ORIENTATION: ANTERIOR;MID

## 2018-07-20 ASSESSMENT — PAIN DESCRIPTION - LOCATION: LOCATION: HEAD

## 2018-07-20 ASSESSMENT — PAIN DESCRIPTION - PAIN TYPE: TYPE: CHRONIC PAIN

## 2018-07-20 ASSESSMENT — PAIN DESCRIPTION - ONSET: ONSET: ON-GOING

## 2018-07-20 ASSESSMENT — PAIN DESCRIPTION - FREQUENCY: FREQUENCY: CONTINUOUS

## 2018-07-20 NOTE — PLAN OF CARE
Problem: Serum Glucose Level - Abnormal:  Goal: Ability to maintain appropriate glucose levels will improve  Ability to maintain appropriate glucose levels will improve   Outcome: Met This Shift      Comments: Patient blood glucose level remained in range, without need for additional medication other than prescribed dose on MAR for day turn.

## 2018-07-20 NOTE — DISCHARGE SUMMARY
within 24 hours on insulin drip. She was give several liters of IVF in ER. She was transitioned back to her home insulin regimen and tolerated diet. She received 2 doses of ceftriaxone for UTI and will resume Omnicef which she had just started prior to admission. Discharge Exam:    General Appearance: alert and oriented to person, place and time and in no acute distress  Skin: warm and dry  Head: normocephalic and atraumatic  Eyes: pupils equal, round, and reactive to light, extraocular eye movements intact, conjunctivae normal  Neck: neck supple and non tender without mass   Pulmonary/Chest: clear to auscultation bilaterally- no wheezes, rales or rhonchi, normal air movement, no respiratory distress  Cardiovascular: normal rate, normal S1 and S2 and no carotid bruits  Abdomen: soft, non-tender, non-distended, normal bowel sounds, no masses or organomegaly  Extremities: no cyanosis, no clubbing and no edema  Neurologic: no cranial nerve deficit and speech normal    I/O last 3 completed shifts: In: 3557 [P.O.:720; I.V.:3904; IV Piggyback:250]  Out: 9401 [Urine:3650]  No intake/output data recorded. LABS:  Recent Labs      07/19/18   1605  07/19/18   2150  07/20/18   0625   NA  144  137  134   K  3.4*  4.3  4.3   CL  114*  107  105   CO2  22  21*  21*   BUN  17  15  14   CREATININE  0.7  0.7  0.7   GLUCOSE  193*  274*  96   CALCIUM  7.7*  7.5*  8.1*       Recent Labs      07/19/18   1020  07/20/18   0625   WBC  5.3  7.5   RBC  3.59  2.95*   HGB  11.0*  9.2*   HCT  34.0  27.1*   MCV  94.7  91.9   MCH  30.6  31.2   MCHC  32.4  33.9   RDW  12.5  12.3   PLT  271  238   MPV  10.8  10.2       Recent Labs      07/19/18   1254   POCGLU  653       Imaging:      Xr Chest Portable    Result Date: 7/19/2018  LOCATION: 200 EXAM: XR CHEST PORTABLE COMPARISON: 7/17/2018 HISTORY: Shortness of breath TECHNIQUE: Single frontal view of the chest was obtained.  FINDINGS:  SUPPORT DEVICES: LogicLadder catheter projects in

## 2018-07-21 LAB — MRSA CULTURE ONLY: NORMAL

## 2018-07-22 LAB
ORGANISM: ABNORMAL
URINE CULTURE, ROUTINE: ABNORMAL
URINE CULTURE, ROUTINE: ABNORMAL

## 2018-07-24 LAB — BLOOD CULTURE, ROUTINE: NORMAL

## 2018-08-16 PROBLEM — E86.0 DEHYDRATION: Status: RESOLVED | Noted: 2018-03-18 | Resolved: 2018-08-16

## 2018-08-16 LAB
EKG ATRIAL RATE: 92 BPM
EKG P AXIS: 49 DEGREES
EKG P-R INTERVAL: 116 MS
EKG Q-T INTERVAL: 350 MS
EKG QRS DURATION: 92 MS
EKG QTC CALCULATION (BAZETT): 432 MS
EKG R AXIS: 58 DEGREES
EKG T AXIS: 41 DEGREES
EKG VENTRICULAR RATE: 92 BPM

## 2018-08-17 ENCOUNTER — APPOINTMENT (OUTPATIENT)
Dept: GENERAL RADIOLOGY | Age: 28
DRG: 566 | End: 2018-08-17
Payer: COMMERCIAL

## 2018-08-17 ENCOUNTER — HOSPITAL ENCOUNTER (INPATIENT)
Age: 28
LOS: 1 days | Discharge: HOME | DRG: 566 | End: 2018-08-18
Attending: EMERGENCY MEDICINE | Admitting: INTERNAL MEDICINE
Payer: COMMERCIAL

## 2018-08-17 ENCOUNTER — APPOINTMENT (OUTPATIENT)
Dept: ULTRASOUND IMAGING | Age: 28
DRG: 566 | End: 2018-08-17
Payer: COMMERCIAL

## 2018-08-17 DIAGNOSIS — R94.31 ABNORMAL EKG: ICD-10-CM

## 2018-08-17 DIAGNOSIS — N17.9 AKI (ACUTE KIDNEY INJURY) (HCC): ICD-10-CM

## 2018-08-17 DIAGNOSIS — Z3A.01 6 WEEKS GESTATION OF PREGNANCY: ICD-10-CM

## 2018-08-17 DIAGNOSIS — R79.89 ELEVATED LACTIC ACID LEVEL: ICD-10-CM

## 2018-08-17 DIAGNOSIS — E87.0: Primary | ICD-10-CM

## 2018-08-17 DIAGNOSIS — R65.10 SIRS (SYSTEMIC INFLAMMATORY RESPONSE SYNDROME) (HCC): ICD-10-CM

## 2018-08-17 DIAGNOSIS — E10.65 HYPERGLYCEMIA DUE TO TYPE 1 DIABETES MELLITUS (HCC): ICD-10-CM

## 2018-08-17 DIAGNOSIS — E10.65: Primary | ICD-10-CM

## 2018-08-17 DIAGNOSIS — R79.89 KETONEMIA: ICD-10-CM

## 2018-08-17 DIAGNOSIS — E10.69: Primary | ICD-10-CM

## 2018-08-17 PROBLEM — Z34.91 FIRST TRIMESTER PREGNANCY: Status: ACTIVE | Noted: 2018-08-17

## 2018-08-17 LAB
ALBUMIN SERPL-MCNC: 3.7 G/DL (ref 3.5–5.2)
ALP BLD-CCNC: 148 U/L (ref 35–104)
ALT SERPL-CCNC: 12 U/L (ref 0–32)
ANION GAP SERPL CALCULATED.3IONS-SCNC: 17 MMOL/L (ref 7–16)
ANION GAP SERPL CALCULATED.3IONS-SCNC: 26 MMOL/L (ref 7–16)
AST SERPL-CCNC: 16 U/L (ref 0–31)
BACTERIA: ABNORMAL /HPF
BASOPHILS ABSOLUTE: 0.12 E9/L (ref 0–0.2)
BASOPHILS RELATIVE PERCENT: 0.9 % (ref 0–2)
BETA-HYDROXYBUTYRATE: 3.25 MMOL/L (ref 0.02–0.27)
BILIRUB SERPL-MCNC: 0.3 MG/DL (ref 0–1.2)
BILIRUBIN URINE: NEGATIVE
BLOOD, URINE: ABNORMAL
BUN BLDV-MCNC: 22 MG/DL (ref 6–20)
BUN BLDV-MCNC: 26 MG/DL (ref 6–20)
CALCIUM SERPL-MCNC: 8.6 MG/DL (ref 8.6–10.2)
CALCIUM SERPL-MCNC: 9.8 MG/DL (ref 8.6–10.2)
CHLORIDE BLD-SCNC: 91 MMOL/L (ref 98–107)
CHLORIDE BLD-SCNC: 99 MMOL/L (ref 98–107)
CHP ED QC CHECK: NORMAL
CLARITY: CLEAR
CO2: 23 MMOL/L (ref 22–29)
CO2: 24 MMOL/L (ref 22–29)
COLOR: YELLOW
CREAT SERPL-MCNC: 1 MG/DL (ref 0.5–1)
CREAT SERPL-MCNC: 1.2 MG/DL (ref 0.5–1)
EOSINOPHILS ABSOLUTE: 0.07 E9/L (ref 0.05–0.5)
EOSINOPHILS RELATIVE PERCENT: 0.5 % (ref 0–6)
GFR AFRICAN AMERICAN: >60
GFR AFRICAN AMERICAN: >60
GFR NON-AFRICAN AMERICAN: >60 ML/MIN/1.73
GFR NON-AFRICAN AMERICAN: >60 ML/MIN/1.73
GLUCOSE BLD-MCNC: 426 MG/DL (ref 74–109)
GLUCOSE BLD-MCNC: 688 MG/DL (ref 74–109)
GLUCOSE URINE: >=1000 MG/DL
GONADOTROPIN, CHORIONIC (HCG) QUANT: ABNORMAL MIU/ML
HCG QUALITATIVE: POSITIVE
HCT VFR BLD CALC: 32.8 % (ref 34–48)
HEMOGLOBIN: 10.9 G/DL (ref 11.5–15.5)
IMMATURE GRANULOCYTES #: 0.06 E9/L
IMMATURE GRANULOCYTES %: 0.5 % (ref 0–5)
KETONES, URINE: 40 MG/DL
LACTIC ACID: 5.2 MMOL/L (ref 0.5–2.2)
LACTIC ACID: 6.9 MMOL/L (ref 0.5–2.2)
LEUKOCYTE ESTERASE, URINE: NEGATIVE
LIPASE: 10 U/L (ref 13–60)
LYMPHOCYTES ABSOLUTE: 1.71 E9/L (ref 1.5–4)
LYMPHOCYTES RELATIVE PERCENT: 13.3 % (ref 20–42)
MCH RBC QN AUTO: 30.7 PG (ref 26–35)
MCHC RBC AUTO-ENTMCNC: 33.2 % (ref 32–34.5)
MCV RBC AUTO: 92.4 FL (ref 80–99.9)
METER GLUCOSE: >500 MG/DL (ref 70–110)
MONOCYTES ABSOLUTE: 0.45 E9/L (ref 0.1–0.95)
MONOCYTES RELATIVE PERCENT: 3.5 % (ref 2–12)
NEUTROPHILS ABSOLUTE: 10.49 E9/L (ref 1.8–7.3)
NEUTROPHILS RELATIVE PERCENT: 81.3 % (ref 43–80)
NITRITE, URINE: NEGATIVE
OSMOLALITY: 324 MOSM/KG (ref 285–310)
PDW BLD-RTO: 13 FL (ref 11.5–15)
PH UA: 6.5 (ref 5–9)
PH VENOUS: 7.39 (ref 7.3–7.42)
PLATELET # BLD: 367 E9/L (ref 130–450)
PMV BLD AUTO: 10.4 FL (ref 7–12)
POTASSIUM SERPL-SCNC: 3.8 MMOL/L (ref 3.5–5)
POTASSIUM SERPL-SCNC: 4.1 MMOL/L (ref 3.5–5)
PREGNANCY TEST URINE, POC: NORMAL
PROTEIN UA: ABNORMAL MG/DL
RBC # BLD: 3.55 E12/L (ref 3.5–5.5)
RBC UA: ABNORMAL /HPF (ref 0–2)
SODIUM BLD-SCNC: 140 MMOL/L (ref 132–146)
SODIUM BLD-SCNC: 140 MMOL/L (ref 132–146)
SPECIFIC GRAVITY UA: <=1.005 (ref 1–1.03)
TOTAL PROTEIN: 6.9 G/DL (ref 6.4–8.3)
TROPONIN: <0.01 NG/ML (ref 0–0.03)
UROBILINOGEN, URINE: 0.2 E.U./DL
WBC # BLD: 12.9 E9/L (ref 4.5–11.5)
WBC UA: ABNORMAL /HPF (ref 0–5)

## 2018-08-17 PROCEDURE — 83605 ASSAY OF LACTIC ACID: CPT

## 2018-08-17 PROCEDURE — 6370000000 HC RX 637 (ALT 250 FOR IP): Performed by: EMERGENCY MEDICINE

## 2018-08-17 PROCEDURE — 85025 COMPLETE CBC W/AUTO DIFF WBC: CPT

## 2018-08-17 PROCEDURE — 87088 URINE BACTERIA CULTURE: CPT

## 2018-08-17 PROCEDURE — 84484 ASSAY OF TROPONIN QUANT: CPT

## 2018-08-17 PROCEDURE — 71045 X-RAY EXAM CHEST 1 VIEW: CPT

## 2018-08-17 PROCEDURE — 82010 KETONE BODYS QUAN: CPT

## 2018-08-17 PROCEDURE — 84702 CHORIONIC GONADOTROPIN TEST: CPT

## 2018-08-17 PROCEDURE — 76817 TRANSVAGINAL US OBSTETRIC: CPT

## 2018-08-17 PROCEDURE — 83930 ASSAY OF BLOOD OSMOLALITY: CPT

## 2018-08-17 PROCEDURE — 80048 BASIC METABOLIC PNL TOTAL CA: CPT

## 2018-08-17 PROCEDURE — 82800 BLOOD PH: CPT

## 2018-08-17 PROCEDURE — 82962 GLUCOSE BLOOD TEST: CPT

## 2018-08-17 PROCEDURE — 81001 URINALYSIS AUTO W/SCOPE: CPT

## 2018-08-17 PROCEDURE — 80307 DRUG TEST PRSMV CHEM ANLYZR: CPT

## 2018-08-17 PROCEDURE — 96374 THER/PROPH/DIAG INJ IV PUSH: CPT

## 2018-08-17 PROCEDURE — 87040 BLOOD CULTURE FOR BACTERIA: CPT

## 2018-08-17 PROCEDURE — 2000000000 HC ICU R&B

## 2018-08-17 PROCEDURE — 2580000003 HC RX 258: Performed by: EMERGENCY MEDICINE

## 2018-08-17 PROCEDURE — 83690 ASSAY OF LIPASE: CPT

## 2018-08-17 PROCEDURE — 84703 CHORIONIC GONADOTROPIN ASSAY: CPT

## 2018-08-17 PROCEDURE — 99285 EMERGENCY DEPT VISIT HI MDM: CPT

## 2018-08-17 PROCEDURE — 6360000002 HC RX W HCPCS: Performed by: EMERGENCY MEDICINE

## 2018-08-17 PROCEDURE — 36415 COLL VENOUS BLD VENIPUNCTURE: CPT

## 2018-08-17 PROCEDURE — 80053 COMPREHEN METABOLIC PANEL: CPT

## 2018-08-17 RX ORDER — MAGNESIUM SULFATE 1 G/100ML
1 INJECTION INTRAVENOUS PRN
Status: DISCONTINUED | OUTPATIENT
Start: 2018-08-17 | End: 2018-08-18 | Stop reason: HOSPADM

## 2018-08-17 RX ORDER — 0.9 % SODIUM CHLORIDE 0.9 %
1000 INTRAVENOUS SOLUTION INTRAVENOUS ONCE
Status: COMPLETED | OUTPATIENT
Start: 2018-08-17 | End: 2018-08-18

## 2018-08-17 RX ORDER — SODIUM CHLORIDE 450 MG/100ML
INJECTION, SOLUTION INTRAVENOUS CONTINUOUS
Status: DISCONTINUED | OUTPATIENT
Start: 2018-08-17 | End: 2018-08-18 | Stop reason: ALTCHOICE

## 2018-08-17 RX ORDER — NICOTINE POLACRILEX 4 MG
15 LOZENGE BUCCAL PRN
Status: DISCONTINUED | OUTPATIENT
Start: 2018-08-17 | End: 2018-08-18 | Stop reason: HOSPADM

## 2018-08-17 RX ORDER — POTASSIUM CHLORIDE 7.45 MG/ML
10 INJECTION INTRAVENOUS PRN
Status: DISCONTINUED | OUTPATIENT
Start: 2018-08-17 | End: 2018-08-18 | Stop reason: SDUPTHER

## 2018-08-17 RX ORDER — SODIUM CHLORIDE 450 MG/100ML
INJECTION, SOLUTION INTRAVENOUS CONTINUOUS
Status: DISCONTINUED | OUTPATIENT
Start: 2018-08-18 | End: 2018-08-18

## 2018-08-17 RX ORDER — 0.9 % SODIUM CHLORIDE 0.9 %
1000 INTRAVENOUS SOLUTION INTRAVENOUS ONCE
Status: COMPLETED | OUTPATIENT
Start: 2018-08-17 | End: 2018-08-17

## 2018-08-17 RX ORDER — 0.9 % SODIUM CHLORIDE 0.9 %
30 INTRAVENOUS SOLUTION INTRAVENOUS ONCE
Status: COMPLETED | OUTPATIENT
Start: 2018-08-17 | End: 2018-08-17

## 2018-08-17 RX ORDER — DEXTROSE MONOHYDRATE 25 G/50ML
12.5 INJECTION, SOLUTION INTRAVENOUS PRN
Status: DISCONTINUED | OUTPATIENT
Start: 2018-08-17 | End: 2018-08-18 | Stop reason: HOSPADM

## 2018-08-17 RX ORDER — LANOLIN ALCOHOL/MO/W.PET/CERES
0.4 CREAM (GRAM) TOPICAL DAILY
Status: DISCONTINUED | OUTPATIENT
Start: 2018-08-18 | End: 2018-08-18 | Stop reason: HOSPADM

## 2018-08-17 RX ORDER — ONDANSETRON 2 MG/ML
4 INJECTION INTRAMUSCULAR; INTRAVENOUS ONCE
Status: COMPLETED | OUTPATIENT
Start: 2018-08-17 | End: 2018-08-17

## 2018-08-17 RX ORDER — PRENATAL WITH FERROUS FUM AND FOLIC ACID 3080; 920; 120; 400; 22; 1.84; 3; 20; 10; 1; 12; 200; 27; 25; 2 [IU]/1; [IU]/1; MG/1; [IU]/1; MG/1; MG/1; MG/1; MG/1; MG/1; MG/1; UG/1; MG/1; MG/1; MG/1; MG/1
1 TABLET ORAL DAILY
Status: DISCONTINUED | OUTPATIENT
Start: 2018-08-18 | End: 2018-08-18

## 2018-08-17 RX ORDER — DEXTROSE AND SODIUM CHLORIDE 5; .45 G/100ML; G/100ML
INJECTION, SOLUTION INTRAVENOUS CONTINUOUS PRN
Status: DISCONTINUED | OUTPATIENT
Start: 2018-08-17 | End: 2018-08-18

## 2018-08-17 RX ORDER — DEXTROSE, SODIUM CHLORIDE, AND POTASSIUM CHLORIDE 5; .45; .15 G/100ML; G/100ML; G/100ML
INJECTION INTRAVENOUS CONTINUOUS PRN
Status: DISCONTINUED | OUTPATIENT
Start: 2018-08-17 | End: 2018-08-18

## 2018-08-17 RX ORDER — DEXTROSE MONOHYDRATE 50 MG/ML
100 INJECTION, SOLUTION INTRAVENOUS PRN
Status: DISCONTINUED | OUTPATIENT
Start: 2018-08-17 | End: 2018-08-18 | Stop reason: HOSPADM

## 2018-08-17 RX ORDER — POTASSIUM CHLORIDE 7.45 MG/ML
10 INJECTION INTRAVENOUS PRN
Status: DISCONTINUED | OUTPATIENT
Start: 2018-08-17 | End: 2018-08-18 | Stop reason: HOSPADM

## 2018-08-17 RX ADMIN — ONDANSETRON 4 MG: 2 INJECTION, SOLUTION INTRAMUSCULAR; INTRAVENOUS at 20:28

## 2018-08-17 RX ADMIN — SODIUM CHLORIDE 1947 ML: 9 INJECTION, SOLUTION INTRAVENOUS at 20:27

## 2018-08-17 RX ADMIN — SODIUM CHLORIDE 1000 ML: 9 INJECTION, SOLUTION INTRAVENOUS at 22:22

## 2018-08-17 RX ADMIN — SODIUM CHLORIDE 0.1 UNITS/KG/HR: 9 INJECTION, SOLUTION INTRAVENOUS at 23:20

## 2018-08-17 RX ADMIN — SODIUM CHLORIDE 1000 ML: 9 INJECTION, SOLUTION INTRAVENOUS at 23:20

## 2018-08-17 ASSESSMENT — ENCOUNTER SYMPTOMS
SHORTNESS OF BREATH: 1
NAUSEA: 1
COUGH: 1
EYE PAIN: 0
SORE THROAT: 0
ABDOMINAL PAIN: 1
VOMITING: 1
DIARRHEA: 1
WHEEZING: 0
BLOOD IN STOOL: 0
BACK PAIN: 0
RHINORRHEA: 1
EYE REDNESS: 0

## 2018-08-17 ASSESSMENT — PAIN DESCRIPTION - PAIN TYPE: TYPE: ACUTE PAIN

## 2018-08-17 ASSESSMENT — PAIN SCALES - GENERAL: PAINLEVEL_OUTOF10: 9

## 2018-08-17 ASSESSMENT — PAIN DESCRIPTION - LOCATION: LOCATION: ABDOMEN

## 2018-08-17 ASSESSMENT — PAIN DESCRIPTION - DESCRIPTORS: DESCRIPTORS: ACHING

## 2018-08-17 NOTE — ED PROVIDER NOTES
Head: Normocephalic and atraumatic. Nose: Nose normal.   Mouth/Throat: Mucous membranes are dry. No oropharyngeal exudate. Eyes: Pupils are equal, round, and reactive to light. Conjunctivae are normal. Right eye exhibits no discharge. Left eye exhibits no discharge. No scleral icterus. Neck: Normal range of motion. Neck supple. No JVD present. Cardiovascular: Regular rhythm, normal heart sounds and intact distal pulses. Tachycardia present. No murmur heard. No extremity edema. Pulmonary/Chest: Effort normal and breath sounds normal. No stridor. No respiratory distress. She has no wheezes. She has no rales. Port left anterior chest wall. No distress. SPO2 100% RA. Abdominal: Soft. Bowel sounds are normal. There is generalized tenderness. There is no rigidity, no rebound, no guarding and no CVA tenderness. Soft, no distension. Mild generalized tenderness to palpation. No rebound, guarding or peritoneal signs. No surgical abdomen. Musculoskeletal: She exhibits no edema. Lymphadenopathy:     She has no cervical adenopathy. Neurological: She is alert and oriented to person, place, and time. Skin: Skin is warm and dry. No rash noted. She is not diaphoretic. Nursing note and vitals reviewed. Procedures    MDM  Number of Diagnoses or Management Options  6 weeks gestation of pregnancy:   Abnormal EKG:   BC (acute kidney injury) ([facility] [address]):   Elevated lactic acid level:   Hyperglycemia due to type 1 diabetes mellitus ([facility]): Hyperosmolarity due to type 1 diabetes mellitus ([facility] [address]):   Ketonemia:   SIRS (systemic inflammatory response syndrome) [facility]):   Diagnosis management comments: Patient with history of type I diabetes presents with nausea, vomiting and diarrhea. Patient is tachycardic. Labs and imaging were obtained. Incidentally the patient was found to be pregnant. Serum hCG and pelvic ultrasound reveals single intrauterine gestation 6 weeks 1 day.  Lactic acid was found to be elevated at tromethamine]    -------------------------------------------------- RESULTS -------------------------------------------------      EKG Interpretation    Interpreted by emergency department physician    Rhythm: sinus tachycardia  Rate: 120  Axis: normal  Ectopy: none  Conduction: normal  ST Segments: normal  T Waves: Inverted in leads 2, 3, aVF, V3, V4, V5 and V6. Q Waves: none    Clinical Impression: Sinus tachycardia with T wave inversion in the inferior and lateral leads. Compared with prior EKG on 7-19-18 and these are new.     Isabel Engel    Lab  Results for orders placed or performed during the hospital encounter of 08/17/18   CBC auto differential   Result Value Ref Range    WBC 12.9 (H) 4.5 - 11.5 E9/L    RBC 3.55 3.50 - 5.50 E12/L    Hemoglobin 10.9 (L) 11.5 - 15.5 g/dL    Hematocrit 32.8 (L) 34.0 - 48.0 %    MCV 92.4 80.0 - 99.9 fL    MCH 30.7 26.0 - 35.0 pg    MCHC 33.2 32.0 - 34.5 %    RDW 13.0 11.5 - 15.0 fL    Platelets 065 585 - 630 E9/L    MPV 10.4 7.0 - 12.0 fL    Neutrophils % 81.3 (H) 43.0 - 80.0 %    Immature Granulocytes % 0.5 0.0 - 5.0 %    Lymphocytes % 13.3 (L) 20.0 - 42.0 %    Monocytes % 3.5 2.0 - 12.0 %    Eosinophils % 0.5 0.0 - 6.0 %    Basophils % 0.9 0.0 - 2.0 %    Neutrophils # 10.49 (H) 1.80 - 7.30 E9/L    Immature Granulocytes # 0.06 E9/L    Lymphocytes # 1.71 1.50 - 4.00 E9/L    Monocytes # 0.45 0.10 - 0.95 E9/L    Eosinophils # 0.07 0.05 - 0.50 E9/L    Basophils # 0.12 0.00 - 0.20 E9/L   Comprehensive Metabolic Panel   Result Value Ref Range    Sodium 140 132 - 146 mmol/L    Potassium 3.8 3.5 - 5.0 mmol/L    Chloride 91 (L) 98 - 107 mmol/L    CO2 23 22 - 29 mmol/L    Anion Gap 26 (H) 7 - 16 mmol/L    Glucose 688 (HH) 74 - 109 mg/dL    BUN 26 (H) 6 - 20 mg/dL    CREATININE 1.2 (H) 0.5 - 1.0 mg/dL    GFR Non-African American >60 >=60 mL/min/1.73    GFR African American >60     Calcium 9.8 8.6 - 10.2 mg/dL    Total Protein 6.9 6.4 - 8.3 g/dL    Alb 3.7 3.5 - 5.2 g/dL    Total ng/mL    Barbiturate Screen, Ur NOT DETECTED Negative < 200 ng/mL    Benzodiazepine Screen, Urine NOT DETECTED Negative < 200 ng/mL    Cannabinoid Scrn, Ur NOT DETECTED Negative < 50ng/mL    Cocaine Metabolite Screen, Urine NOT DETECTED Negative < 300 ng/mL    Opiate Scrn, Ur NOT DETECTED Negative < 300ng/mL    PCP Screen, Urine NOT DETECTED Negative < 25 ng/mL    Methadone Screen, Urine NOT DETECTED Negative <300 ng/mL    Propoxyphene Scrn, Ur NOT DETECTED Negative <300 ng/mL   POCT Glucose   Result Value Ref Range    Meter Glucose >500 (H) 70 - 110 mg/dL   POC Pregnancy Urine Qual   Result Value Ref Range    Preg Test, Ur Positive. QC OK? Yes. POCT Glucose   Result Value Ref Range    Meter Glucose 316 (H) 70 - 110 mg/dL   POCT Glucose   Result Value Ref Range    Meter Glucose 222 (H) 70 - 110 mg/dL   EKG 12 Lead   Result Value Ref Range    Ventricular Rate 120 BPM    Atrial Rate 120 BPM    P-R Interval 130 ms    QRS Duration 92 ms    Q-T Interval 308 ms    QTc Calculation (Bazett) 435 ms    P Axis 53 degrees    R Axis 60 degrees    T Axis -56 degrees       Radiology  Us Ob Transvaginal    Result Date: 8/17/2018  Reading location: 200 INDICATION: Pregnant, DKA FINDINGS: Transvaginal images. Single intrauterine gestational sac containing yolk sac. Sac diameter 13 mm corresponding to a 6 week 1 day gestation. No fetal pole identified. Unremarkable decidual reaction. Right ovary 26 x 20 x 16.5 mm and left 31 x 17 x 23 mm with unremarkable internal echogenicity. No pelvic fluid. Gestational sac with yolk sac. Sac diameter corresponds to a 6 week 1 day gestation. Xr Chest Portable    Result Date: 8/17/2018  Location:200 Exam: XR CHEST PORTABLE Comparison: 7/19/2018 History: Hyperglycemia Findings: Portable AP upright chest. Heart size is normal. Pulmonary vessels are nondistended. No focal pulmonary parenchymal consolidation. No acute cardiopulmonary disease.     Xr Chest Portable    Result Date:

## 2018-08-18 VITALS
HEART RATE: 102 BPM | DIASTOLIC BLOOD PRESSURE: 70 MMHG | HEIGHT: 64 IN | OXYGEN SATURATION: 100 % | TEMPERATURE: 98 F | RESPIRATION RATE: 16 BRPM | BODY MASS INDEX: 24.04 KG/M2 | WEIGHT: 140.8 LBS | SYSTOLIC BLOOD PRESSURE: 109 MMHG

## 2018-08-18 PROBLEM — E86.0 DEHYDRATION: Status: RESOLVED | Noted: 2018-07-19 | Resolved: 2018-08-18

## 2018-08-18 LAB
AMPHETAMINE SCREEN, URINE: NOT DETECTED
AMYLASE: 34 U/L (ref 20–100)
ANION GAP SERPL CALCULATED.3IONS-SCNC: 12 MMOL/L (ref 7–16)
ANION GAP SERPL CALCULATED.3IONS-SCNC: 8 MMOL/L (ref 7–16)
ANION GAP SERPL CALCULATED.3IONS-SCNC: 8 MMOL/L (ref 7–16)
BARBITURATE SCREEN URINE: NOT DETECTED
BASOPHILS ABSOLUTE: 0.09 E9/L (ref 0–0.2)
BASOPHILS RELATIVE PERCENT: 0.9 % (ref 0–2)
BENZODIAZEPINE SCREEN, URINE: NOT DETECTED
BUN BLDV-MCNC: 14 MG/DL (ref 6–20)
BUN BLDV-MCNC: 16 MG/DL (ref 6–20)
BUN BLDV-MCNC: 17 MG/DL (ref 6–20)
CALCIUM SERPL-MCNC: 7.4 MG/DL (ref 8.6–10.2)
CALCIUM SERPL-MCNC: 7.6 MG/DL (ref 8.6–10.2)
CALCIUM SERPL-MCNC: 7.6 MG/DL (ref 8.6–10.2)
CANNABINOID SCREEN URINE: NOT DETECTED
CHLORIDE BLD-SCNC: 107 MMOL/L (ref 98–107)
CHLORIDE BLD-SCNC: 107 MMOL/L (ref 98–107)
CHLORIDE BLD-SCNC: 109 MMOL/L (ref 98–107)
CO2: 23 MMOL/L (ref 22–29)
CO2: 24 MMOL/L (ref 22–29)
CO2: 25 MMOL/L (ref 22–29)
COCAINE METABOLITE SCREEN URINE: NOT DETECTED
CREAT SERPL-MCNC: 0.8 MG/DL (ref 0.5–1)
CREAT SERPL-MCNC: 0.9 MG/DL (ref 0.5–1)
CREAT SERPL-MCNC: 0.9 MG/DL (ref 0.5–1)
EOSINOPHILS ABSOLUTE: 0.05 E9/L (ref 0.05–0.5)
EOSINOPHILS RELATIVE PERCENT: 0.5 % (ref 0–6)
GFR AFRICAN AMERICAN: >60
GFR NON-AFRICAN AMERICAN: >60 ML/MIN/1.73
GLUCOSE BLD-MCNC: 208 MG/DL (ref 74–109)
GLUCOSE BLD-MCNC: 73 MG/DL (ref 74–109)
GLUCOSE BLD-MCNC: 99 MG/DL (ref 74–109)
HCT VFR BLD CALC: 24.4 % (ref 34–48)
HEMOGLOBIN: 8.1 G/DL (ref 11.5–15.5)
IMMATURE GRANULOCYTES #: 0.05 E9/L
IMMATURE GRANULOCYTES %: 0.5 % (ref 0–5)
LACTIC ACID: 0.7 MMOL/L (ref 0.5–2.2)
LIPASE: 9 U/L (ref 13–60)
LV EF: 55 %
LVEF MODALITY: NORMAL
LYMPHOCYTES ABSOLUTE: 3.1 E9/L (ref 1.5–4)
LYMPHOCYTES RELATIVE PERCENT: 30.1 % (ref 20–42)
MAGNESIUM: 1.6 MG/DL (ref 1.6–2.6)
MAGNESIUM: 1.7 MG/DL (ref 1.6–2.6)
MAGNESIUM: 1.8 MG/DL (ref 1.6–2.6)
MCH RBC QN AUTO: 30.8 PG (ref 26–35)
MCHC RBC AUTO-ENTMCNC: 33.2 % (ref 32–34.5)
MCV RBC AUTO: 92.8 FL (ref 80–99.9)
METER GLUCOSE: 112 MG/DL (ref 70–110)
METER GLUCOSE: 127 MG/DL (ref 70–110)
METER GLUCOSE: 222 MG/DL (ref 70–110)
METER GLUCOSE: 316 MG/DL (ref 70–110)
METER GLUCOSE: 59 MG/DL (ref 70–110)
METER GLUCOSE: 82 MG/DL (ref 70–110)
METER GLUCOSE: 90 MG/DL (ref 70–110)
METHADONE SCREEN, URINE: NOT DETECTED
MONOCYTES ABSOLUTE: 0.64 E9/L (ref 0.1–0.95)
MONOCYTES RELATIVE PERCENT: 6.2 % (ref 2–12)
NEUTROPHILS ABSOLUTE: 6.37 E9/L (ref 1.8–7.3)
NEUTROPHILS RELATIVE PERCENT: 61.8 % (ref 43–80)
OPIATE SCREEN URINE: NOT DETECTED
OSMOLALITY: 291 MOSM/KG (ref 285–310)
PDW BLD-RTO: 12.6 FL (ref 11.5–15)
PHENCYCLIDINE SCREEN URINE: NOT DETECTED
PHOSPHORUS: 1.9 MG/DL (ref 2.5–4.5)
PHOSPHORUS: 2.9 MG/DL (ref 2.5–4.5)
PHOSPHORUS: 3 MG/DL (ref 2.5–4.5)
PLATELET # BLD: 255 E9/L (ref 130–450)
PMV BLD AUTO: 10.1 FL (ref 7–12)
POTASSIUM SERPL-SCNC: 3.4 MMOL/L (ref 3.5–5)
POTASSIUM SERPL-SCNC: 3.8 MMOL/L (ref 3.5–5)
POTASSIUM SERPL-SCNC: 4.7 MMOL/L (ref 3.5–5)
PROPOXYPHENE SCREEN: NOT DETECTED
RBC # BLD: 2.63 E12/L (ref 3.5–5.5)
SODIUM BLD-SCNC: 140 MMOL/L (ref 132–146)
SODIUM BLD-SCNC: 141 MMOL/L (ref 132–146)
SODIUM BLD-SCNC: 142 MMOL/L (ref 132–146)
TROPONIN: <0.01 NG/ML (ref 0–0.03)
TROPONIN: <0.01 NG/ML (ref 0–0.03)
WBC # BLD: 10.3 E9/L (ref 4.5–11.5)

## 2018-08-18 PROCEDURE — 82150 ASSAY OF AMYLASE: CPT

## 2018-08-18 PROCEDURE — 83690 ASSAY OF LIPASE: CPT

## 2018-08-18 PROCEDURE — 83605 ASSAY OF LACTIC ACID: CPT

## 2018-08-18 PROCEDURE — 85025 COMPLETE CBC W/AUTO DIFF WBC: CPT

## 2018-08-18 PROCEDURE — 87081 CULTURE SCREEN ONLY: CPT

## 2018-08-18 PROCEDURE — 93306 TTE W/DOPPLER COMPLETE: CPT

## 2018-08-18 PROCEDURE — 36591 DRAW BLOOD OFF VENOUS DEVICE: CPT

## 2018-08-18 PROCEDURE — 2500000003 HC RX 250 WO HCPCS: Performed by: NURSE PRACTITIONER

## 2018-08-18 PROCEDURE — 80048 BASIC METABOLIC PNL TOTAL CA: CPT

## 2018-08-18 PROCEDURE — 83930 ASSAY OF BLOOD OSMOLALITY: CPT

## 2018-08-18 PROCEDURE — 2580000003 HC RX 258: Performed by: NURSE PRACTITIONER

## 2018-08-18 PROCEDURE — 2580000003 HC RX 258: Performed by: EMERGENCY MEDICINE

## 2018-08-18 PROCEDURE — 6360000002 HC RX W HCPCS: Performed by: NURSE PRACTITIONER

## 2018-08-18 PROCEDURE — 83735 ASSAY OF MAGNESIUM: CPT

## 2018-08-18 PROCEDURE — 6370000000 HC RX 637 (ALT 250 FOR IP): Performed by: INTERNAL MEDICINE

## 2018-08-18 PROCEDURE — 36415 COLL VENOUS BLD VENIPUNCTURE: CPT

## 2018-08-18 PROCEDURE — 93005 ELECTROCARDIOGRAM TRACING: CPT | Performed by: NURSE PRACTITIONER

## 2018-08-18 PROCEDURE — 84100 ASSAY OF PHOSPHORUS: CPT

## 2018-08-18 PROCEDURE — 2500000003 HC RX 250 WO HCPCS: Performed by: EMERGENCY MEDICINE

## 2018-08-18 PROCEDURE — 87040 BLOOD CULTURE FOR BACTERIA: CPT

## 2018-08-18 PROCEDURE — 84484 ASSAY OF TROPONIN QUANT: CPT

## 2018-08-18 PROCEDURE — 6370000000 HC RX 637 (ALT 250 FOR IP): Performed by: NURSE PRACTITIONER

## 2018-08-18 PROCEDURE — 82962 GLUCOSE BLOOD TEST: CPT

## 2018-08-18 RX ORDER — PRENATAL WITH FERROUS FUM AND FOLIC ACID 3080; 920; 120; 400; 22; 1.84; 3; 20; 10; 1; 12; 200; 27; 25; 2 [IU]/1; [IU]/1; MG/1; [IU]/1; MG/1; MG/1; MG/1; MG/1; MG/1; MG/1; UG/1; MG/1; MG/1; MG/1; MG/1
1 TABLET ORAL DAILY
Status: DISCONTINUED | OUTPATIENT
Start: 2018-08-18 | End: 2018-08-18 | Stop reason: HOSPADM

## 2018-08-18 RX ORDER — SODIUM CHLORIDE 9 MG/ML
INJECTION, SOLUTION INTRAVENOUS CONTINUOUS
Status: DISCONTINUED | OUTPATIENT
Start: 2018-08-18 | End: 2018-08-18

## 2018-08-18 RX ORDER — PRENATAL WITH FERROUS FUM AND FOLIC ACID 3080; 920; 120; 400; 22; 1.84; 3; 20; 10; 1; 12; 200; 27; 25; 2 [IU]/1; [IU]/1; MG/1; [IU]/1; MG/1; MG/1; MG/1; MG/1; MG/1; MG/1; UG/1; MG/1; MG/1; MG/1; MG/1
1 TABLET ORAL DAILY
Qty: 30 TABLET | Refills: 0 | OUTPATIENT
Start: 2018-08-19

## 2018-08-18 RX ORDER — INSULIN GLARGINE 100 [IU]/ML
25 INJECTION, SOLUTION SUBCUTANEOUS NIGHTLY
Status: DISCONTINUED | OUTPATIENT
Start: 2018-08-18 | End: 2018-08-18 | Stop reason: HOSPADM

## 2018-08-18 RX ORDER — LANOLIN ALCOHOL/MO/W.PET/CERES
0.4 CREAM (GRAM) TOPICAL DAILY
Qty: 30 TABLET | Refills: 3 | OUTPATIENT
Start: 2018-08-19

## 2018-08-18 RX ADMIN — SODIUM CHLORIDE: 9 INJECTION, SOLUTION INTRAVENOUS at 04:04

## 2018-08-18 RX ADMIN — POTASSIUM CHLORIDE 10 MEQ: 7.46 INJECTION, SOLUTION INTRAVENOUS at 03:24

## 2018-08-18 RX ADMIN — POTASSIUM CHLORIDE 10 MEQ: 7.46 INJECTION, SOLUTION INTRAVENOUS at 02:41

## 2018-08-18 RX ADMIN — ENOXAPARIN SODIUM 40 MG: 40 INJECTION SUBCUTANEOUS at 08:16

## 2018-08-18 RX ADMIN — VITAMIN A ACETATE, .BETA.-CAROTENE, ASCORBIC ACID, CHOLECALCIFEROL, .ALPHA.-TOCOPHEROL ACETATE, DL-, THIAMINE MONONITRATE, RIBOFLAVIN, NIACINAMIDE, PYRIDOXINE HYDROCHLORIDE, FOLIC ACID, CYANOCOBALAMIN, CALCIUM CARBONATE, FERROUS FUMARATE, ZINC OXIDE, AND CUPRIC OXIDE 1 TABLET: 2000; 2000; 120; 400; 22; 1.84; 3; 20; 10; 1; 12; 200; 27; 25; 2 TABLET ORAL at 01:02

## 2018-08-18 RX ADMIN — SODIUM PHOSPHATE, MONOBASIC, MONOHYDRATE 15 MMOL: 276; 142 INJECTION, SOLUTION INTRAVENOUS at 02:16

## 2018-08-18 RX ADMIN — VITAMIN A ACETATE, .BETA.-CAROTENE, ASCORBIC ACID, CHOLECALCIFEROL, .ALPHA.-TOCOPHEROL ACETATE, DL-, THIAMINE MONONITRATE, RIBOFLAVIN, NIACINAMIDE, PYRIDOXINE HYDROCHLORIDE, FOLIC ACID, CYANOCOBALAMIN, CALCIUM CARBONATE, FERROUS FUMARATE, ZINC OXIDE, AND CUPRIC OXIDE 1 TABLET: 2000; 2000; 120; 400; 22; 1.84; 3; 20; 10; 1; 12; 200; 27; 25; 2 TABLET ORAL at 10:23

## 2018-08-18 RX ADMIN — INSULIN GLARGINE 25 UNITS: 100 INJECTION, SOLUTION SUBCUTANEOUS at 03:53

## 2018-08-18 RX ADMIN — POTASSIUM CHLORIDE 10 MEQ: 7.46 INJECTION, SOLUTION INTRAVENOUS at 02:03

## 2018-08-18 RX ADMIN — FOLIC ACID TAB 400 MCG 0.4 MG: 400 TAB at 01:11

## 2018-08-18 RX ADMIN — POTASSIUM CHLORIDE, DEXTROSE MONOHYDRATE AND SODIUM CHLORIDE: 150; 5; 450 INJECTION, SOLUTION INTRAVENOUS at 01:11

## 2018-08-18 ASSESSMENT — PAIN SCALES - GENERAL
PAINLEVEL_OUTOF10: 0

## 2018-08-18 NOTE — H&P
[facility] Hospitalist Group History and Physical      CHIEF COMPLAINT:  Sick x 1 week    History of Present Illness:  34-year-old female presenting to the emergency department for evaluation after being sick for one week. She has what could be describes brittle type I diabetes and visits our hospital frequently for related complications. While being evaluated in the emergency department she was incidentally found to be pregnant. Urine pregnancy was confirmed with serum pregnancy qualitative pregnancy. Pelvic ultrasound was performed showing 6 weeks 1 day gestational pregnancy. She was also found to be hyperglycemic with a wide anion gap but non-acidotic pH. Beta hydroxybutyrate was elevated as well. Patient appeared to have normal sodium, but corrected sodium places patient and hypernatremic range. No other significant electrolyte abnormalities were appreciable. Patient clinically appeared dehydrated with heme concentrated CBC and acute renal insufficiency. Patient was given nearly 2 L of normal saline bolus and started on fluid infusion as well as non-bolus insulin infusion. Repeat metabolic panel showed improving renal function, mildly improved corrected sodium but persistently wide anion gap. Patient subsequently admitted to the intensive care unit for further evaluation and management. Patient seen and examined bedside. Patient admittedly has not been feeling well for about a week, significantly worsened over the last 24-48 hours. She admits to fuse abdominal cramping with no focal regions of pain and associated nausea and dry heaving. No bowel pattern changes or blood in the stools. She has not been able to keep anything down, so she has not had any actual vomiting production. No fevers or chills. Decreased appetite and intake of fluid and food as mentioned.  When questioned about possible pregnancy, the patient states that she has a very irregular menstrual cycle and has not had a definite menstrual period (LANTUS) 100 UNIT/ML injection vial Inject 25 Units into the skin nightly    Historical Provider, MD   insulin lispro (HUMALOG) 100 UNIT/ML injection vial Inject 0-10 Units into the skin 3 times daily (with meals) *Per Sliding Scale*    Historical Provider, MD   glucose (GLUTOSE) 40 % GEL Take 15 g by mouth as needed 4/1/16   Jayme Smalls MD   glucose blood VI test strips (ASCENSIA AUTODISC VI;ONE TOUCH ULTRA TEST VI) strip Indications: 5x a day Freestyle Lite -Tests 5 times daily and as needed for low glucose. E10.10 4/1/16   Jayme Smalls MD   LANCETS THIN by Does not apply route. Indications: cks 5xa a day    Historical Provider, MD   Insulin Syringe-Needle U-100 (INSULIN SYRINGE .5CC/30GX1/2\") 30G X 1/2\" 0.5 ML MISC by Does not apply route. Indications: uses 6-7 a day    Historical Provider, MD       Allergies:    Nicoderm [nicotine] and Toradol [ketorolac tromethamine]    Social History:    reports that she has been smoking Cigarettes. She has a 1.50 pack-year smoking history. She has never used smokeless tobacco. She reports that she does not drink alcohol or use drugs. Family History:   family history includes Asthma in her brother and mother; Diabetes in her mother; High Blood Pressure in her father and mother; Hypertension in her mother. PHYSICAL EXAM:  Vitals:  BP 93/60   Pulse 98   Temp 97.8 °F (36.6 °C) (Oral)   Resp 20   Ht 5' 4\" (1.626 m)   Wt 143 lb (64.9 kg)   LMP 06/10/2018 (Approximate)   SpO2 98%   BMI 24.55 kg/m²     General Appearance: lethargic and oriented to person, place and time and in no acute distress  Skin: warm and dry  Head: normocephalic and atraumatic  Eyes: pupils equal, round, and reactive to light, extraocular eye movements intact, conjunctivae normal. Sclera anicteric  ENT: OM pink, dry and intact without lesions.  Neck: neck supple and non tender without mass   Pulmonary/Chest: Mediport in place, clear to auscultation bilaterally- no wheezes, rales or rhonchi, 6.5     Protein, UA Latest Ref Range: Negative mg/dL  TRACE     Urobilinogen, Urine Latest Ref Range: <2.0 E.U./dL  0.2     Nitrite, Urine Latest Ref Range: Negative   Negative     Leukocyte Esterase, Urine Latest Ref Range: Negative   Negative     WBC, UA Latest Ref Range: 0 - 5 /HPF  NONE     RBC, UA Latest Ref Range: 0 - 2 /HPF  1-3     Bacteria, UA Latest Units: /HPF  NONE     pH, Khurram Latest Ref Range: 7.30 - 7.42  7.39          Radiology: Us Ob Transvaginal    Result Date: 8/17/2018  Reading location: 200 INDICATION: Pregnant, DKA FINDINGS: Transvaginal images. Single intrauterine gestational sac containing yolk sac. Sac diameter 13 mm corresponding to a 6 week 1 day gestation. No fetal pole identified. Unremarkable decidual reaction. Right ovary 26 x 20 x 16.5 mm and left 31 x 17 x 23 mm with unremarkable internal echogenicity. No pelvic fluid. Gestational sac with yolk sac. Sac diameter corresponds to a 6 week 1 day gestation. Xr Chest Portable    Result Date: 8/17/2018  Location:200 Exam: XR CHEST PORTABLE Comparison: 7/19/2018 History: Hyperglycemia Findings: Portable AP upright chest. Heart size is normal. Pulmonary vessels are nondistended. No focal pulmonary parenchymal consolidation. No acute cardiopulmonary disease. EKG: Interpreted by myself with attending physician unless otherwise noted. Time:  20:16:53    Interpretation:  Rhythm: Sinus tachycardia with generalized T wave inversions, borderline voltage criteria for LVH  Rate: 120  Intervals: within defined limits  Axis: normal  ST/T waves: diffuse T wave inversions without acute ST segment changes  R wave progression: normal  Comparison: changes compared to previous EKG.       ASSESSMENT:      Active Problems:    Acute renal failure (HCC)    MTHFR mutation (HCC)    Dehydration    Type 1 diabetes mellitus with hyperosmolarity without coma (HCC)    First trimester pregnancy    Acute electrocardiogram changes  Resolved Problems:    * No

## 2018-08-18 NOTE — PLAN OF CARE
Problem: Serum Glucose Level - Abnormal:  Goal: Ability to maintain appropriate glucose levels will improve  Ability to maintain appropriate glucose levels will improve   Outcome: Met This Shift      Problem: Coping:  Goal: Ability to identify and utilize available support systems will improve  Ability to identify and utilize available support systems will improve   Outcome: Met This Shift

## 2018-08-19 LAB
EKG ATRIAL RATE: 83 BPM
EKG P AXIS: 48 DEGREES
EKG P-R INTERVAL: 108 MS
EKG Q-T INTERVAL: 354 MS
EKG QRS DURATION: 78 MS
EKG QTC CALCULATION (BAZETT): 415 MS
EKG R AXIS: 59 DEGREES
EKG T AXIS: 45 DEGREES
EKG VENTRICULAR RATE: 83 BPM
MRSA CULTURE ONLY: NORMAL

## 2018-08-19 PROCEDURE — 93010 ELECTROCARDIOGRAM REPORT: CPT | Performed by: INTERNAL MEDICINE

## 2018-08-20 LAB — URINE CULTURE, ROUTINE: NORMAL

## 2018-08-21 LAB
EKG ATRIAL RATE: 120 BPM
EKG P AXIS: 53 DEGREES
EKG P-R INTERVAL: 130 MS
EKG Q-T INTERVAL: 308 MS
EKG QRS DURATION: 92 MS
EKG QTC CALCULATION (BAZETT): 435 MS
EKG R AXIS: 60 DEGREES
EKG T AXIS: -56 DEGREES
EKG VENTRICULAR RATE: 120 BPM

## 2018-08-23 LAB
BLOOD CULTURE, ROUTINE: NORMAL
BLOOD CULTURE, ROUTINE: NORMAL
CULTURE, BLOOD 2: NORMAL
CULTURE, BLOOD 2: NORMAL

## 2018-08-28 ENCOUNTER — HOSPITAL ENCOUNTER (EMERGENCY)
Age: 28
Discharge: HOME | End: 2018-08-29
Attending: EMERGENCY MEDICINE
Payer: COMMERCIAL

## 2018-08-28 VITALS
OXYGEN SATURATION: 100 % | SYSTOLIC BLOOD PRESSURE: 130 MMHG | HEART RATE: 104 BPM | RESPIRATION RATE: 18 BRPM | HEIGHT: 64 IN | DIASTOLIC BLOOD PRESSURE: 86 MMHG | TEMPERATURE: 98.2 F | BODY MASS INDEX: 24.41 KG/M2 | WEIGHT: 143 LBS

## 2018-08-28 DIAGNOSIS — E10.65 HYPERGLYCEMIA DUE TO TYPE 1 DIABETES MELLITUS (HCC): Primary | ICD-10-CM

## 2018-08-28 LAB
ALBUMIN SERPL-MCNC: 3.2 G/DL (ref 3.5–5.2)
ALP BLD-CCNC: 127 U/L (ref 35–104)
ALT SERPL-CCNC: 10 U/L (ref 0–32)
ANION GAP SERPL CALCULATED.3IONS-SCNC: 13 MMOL/L (ref 7–16)
AST SERPL-CCNC: 14 U/L (ref 0–31)
BASOPHILS ABSOLUTE: 0.08 E9/L (ref 0–0.2)
BASOPHILS RELATIVE PERCENT: 1.3 % (ref 0–2)
BETA-HYDROXYBUTYRATE: 2.4 MMOL/L (ref 0.02–0.27)
BILIRUB SERPL-MCNC: 0.3 MG/DL (ref 0–1.2)
BUN BLDV-MCNC: 22 MG/DL (ref 6–20)
CALCIUM SERPL-MCNC: 8.2 MG/DL (ref 8.6–10.2)
CHLORIDE BLD-SCNC: 93 MMOL/L (ref 98–107)
CHP ED QC CHECK: NORMAL
CO2: 23 MMOL/L (ref 22–29)
CREAT SERPL-MCNC: 1.1 MG/DL (ref 0.5–1)
EOSINOPHILS ABSOLUTE: 0.18 E9/L (ref 0.05–0.5)
EOSINOPHILS RELATIVE PERCENT: 2.8 % (ref 0–6)
GFR AFRICAN AMERICAN: >60
GFR NON-AFRICAN AMERICAN: >60 ML/MIN/1.73
GLUCOSE BLD-MCNC: 585 MG/DL
GLUCOSE BLD-MCNC: 633 MG/DL (ref 74–109)
HCT VFR BLD CALC: 27 % (ref 34–48)
HEMOGLOBIN: 8.9 G/DL (ref 11.5–15.5)
IMMATURE GRANULOCYTES #: 0.04 E9/L
IMMATURE GRANULOCYTES %: 0.6 % (ref 0–5)
LACTIC ACID: 0.9 MMOL/L (ref 0.5–2.2)
LYMPHOCYTES ABSOLUTE: 1.37 E9/L (ref 1.5–4)
LYMPHOCYTES RELATIVE PERCENT: 21.5 % (ref 20–42)
MCH RBC QN AUTO: 30.7 PG (ref 26–35)
MCHC RBC AUTO-ENTMCNC: 33 % (ref 32–34.5)
MCV RBC AUTO: 93.1 FL (ref 80–99.9)
METER GLUCOSE: 409 MG/DL (ref 70–110)
METER GLUCOSE: >500 MG/DL (ref 70–110)
MONOCYTES ABSOLUTE: 0.44 E9/L (ref 0.1–0.95)
MONOCYTES RELATIVE PERCENT: 6.9 % (ref 2–12)
NEUTROPHILS ABSOLUTE: 4.26 E9/L (ref 1.8–7.3)
NEUTROPHILS RELATIVE PERCENT: 66.9 % (ref 43–80)
PDW BLD-RTO: 12.7 FL (ref 11.5–15)
PLATELET # BLD: 379 E9/L (ref 130–450)
PMV BLD AUTO: 9.6 FL (ref 7–12)
POC ANION GAP: 18
POC BUN: 22
POC CHLORIDE: 94
POC CO2: 22
POC CREATININE: 1.1
POC POTASSIUM: NORMAL
POC SODIUM: 129
POTASSIUM REFLEX MAGNESIUM: 4.2 MMOL/L (ref 3.5–5)
RBC # BLD: 2.9 E12/L (ref 3.5–5.5)
SODIUM BLD-SCNC: 129 MMOL/L (ref 132–146)
TOTAL PROTEIN: 6 G/DL (ref 6.4–8.3)
WBC # BLD: 6.4 E9/L (ref 4.5–11.5)

## 2018-08-28 PROCEDURE — 96374 THER/PROPH/DIAG INJ IV PUSH: CPT

## 2018-08-28 PROCEDURE — 2580000003 HC RX 258: Performed by: EMERGENCY MEDICINE

## 2018-08-28 PROCEDURE — 82010 KETONE BODYS QUAN: CPT

## 2018-08-28 PROCEDURE — 83605 ASSAY OF LACTIC ACID: CPT

## 2018-08-28 PROCEDURE — 2580000003 HC RX 258

## 2018-08-28 PROCEDURE — 80053 COMPREHEN METABOLIC PANEL: CPT

## 2018-08-28 PROCEDURE — 6370000000 HC RX 637 (ALT 250 FOR IP): Performed by: EMERGENCY MEDICINE

## 2018-08-28 PROCEDURE — 82962 GLUCOSE BLOOD TEST: CPT

## 2018-08-28 PROCEDURE — 99284 EMERGENCY DEPT VISIT MOD MDM: CPT

## 2018-08-28 PROCEDURE — 36415 COLL VENOUS BLD VENIPUNCTURE: CPT

## 2018-08-28 PROCEDURE — 85025 COMPLETE CBC W/AUTO DIFF WBC: CPT

## 2018-08-28 RX ORDER — 0.9 % SODIUM CHLORIDE 0.9 %
1000 INTRAVENOUS SOLUTION INTRAVENOUS ONCE
Status: COMPLETED | OUTPATIENT
Start: 2018-08-28 | End: 2018-08-29

## 2018-08-28 RX ORDER — 0.9 % SODIUM CHLORIDE 0.9 %
1000 INTRAVENOUS SOLUTION INTRAVENOUS ONCE
Status: COMPLETED | OUTPATIENT
Start: 2018-08-28 | End: 2018-08-28

## 2018-08-28 RX ORDER — SODIUM CHLORIDE 9 MG/ML
INJECTION, SOLUTION INTRAVENOUS
Status: COMPLETED
Start: 2018-08-28 | End: 2018-08-28

## 2018-08-28 RX ORDER — SODIUM CHLORIDE 9 MG/ML
INJECTION, SOLUTION INTRAVENOUS
Status: COMPLETED
Start: 2018-08-28 | End: 2018-08-29

## 2018-08-28 RX ADMIN — Medication 1000 ML: at 20:31

## 2018-08-28 RX ADMIN — Medication 1000 ML: at 22:57

## 2018-08-28 RX ADMIN — SODIUM CHLORIDE 1000 ML: 900 INJECTION, SOLUTION INTRAVENOUS at 22:57

## 2018-08-28 RX ADMIN — SODIUM CHLORIDE 1000 ML: 9 INJECTION, SOLUTION INTRAVENOUS at 21:38

## 2018-08-28 RX ADMIN — SODIUM CHLORIDE 1000 ML: 900 INJECTION, SOLUTION INTRAVENOUS at 20:31

## 2018-08-28 RX ADMIN — INSULIN HUMAN 6 UNITS: 100 INJECTION, SOLUTION PARENTERAL at 21:49

## 2018-08-28 ASSESSMENT — PAIN SCALES - GENERAL: PAINLEVEL_OUTOF10: 8

## 2018-08-28 NOTE — ED PROVIDER NOTES
home medications have been reviewed.     Allergies: Nicoderm [nicotine] and Toradol [ketorolac tromethamine]    -------------------------------------------------- RESULTS -------------------------------------------------  All laboratory and radiology results have been personally reviewed by myself   LABS:  Results for orders placed or performed during the hospital encounter of 08/28/18   CBC auto differential   Result Value Ref Range    WBC 6.4 4.5 - 11.5 E9/L    RBC 2.90 (L) 3.50 - 5.50 E12/L    Hemoglobin 8.9 (L) 11.5 - 15.5 g/dL    Hematocrit 27.0 (L) 34.0 - 48.0 %    MCV 93.1 80.0 - 99.9 fL    MCH 30.7 26.0 - 35.0 pg    MCHC 33.0 32.0 - 34.5 %    RDW 12.7 11.5 - 15.0 fL    Platelets 151 469 - 394 E9/L    MPV 9.6 7.0 - 12.0 fL    Neutrophils % 66.9 43.0 - 80.0 %    Immature Granulocytes % 0.6 0.0 - 5.0 %    Lymphocytes % 21.5 20.0 - 42.0 %    Monocytes % 6.9 2.0 - 12.0 %    Eosinophils % 2.8 0.0 - 6.0 %    Basophils % 1.3 0.0 - 2.0 %    Neutrophils # 4.26 1.80 - 7.30 E9/L    Immature Granulocytes # 0.04 E9/L    Lymphocytes # 1.37 (L) 1.50 - 4.00 E9/L    Monocytes # 0.44 0.10 - 0.95 E9/L    Eosinophils # 0.18 0.05 - 0.50 E9/L    Basophils # 0.08 0.00 - 0.20 E9/L   Comprehensive Metabolic Panel w/ Reflex to MG   Result Value Ref Range    Sodium 129 (L) 132 - 146 mmol/L    Potassium reflex Magnesium 4.2 3.5 - 5.0 mmol/L    Chloride 93 (L) 98 - 107 mmol/L    CO2 23 22 - 29 mmol/L    Anion Gap 13 7 - 16 mmol/L    Glucose 633 (HH) 74 - 109 mg/dL    BUN 22 (H) 6 - 20 mg/dL    CREATININE 1.1 (H) 0.5 - 1.0 mg/dL    GFR Non-African American >60 >=60 mL/min/1.73    GFR African American >60     Calcium 8.2 (L) 8.6 - 10.2 mg/dL    Total Protein 6.0 (L) 6.4 - 8.3 g/dL    Alb 3.2 (L) 3.5 - 5.2 g/dL    Total Bilirubin 0.3 0.0 - 1.2 mg/dL    Alkaline Phosphatase 127 (H) 35 - 104 U/L    ALT 10 0 - 32 U/L    AST 14 0 - 31 U/L   Lactic acid, plasma   Result Value Ref Range    Lactic Acid 0.9 0.5 - 2.2 mmol/L   Beta-Hydroxybutyrate Result Value Ref Range    Beta-Hydroxybutyrate 2.40 (H) 0.02 - 0.27 mmol/L   POCT Glucose   Result Value Ref Range    Meter Glucose >500 (H) 70 - 110 mg/dL   POCT chem basic w/ ICA   Result Value Ref Range    POC BUN 22     POC Chloride 94     POC Creatinine 1.1     POC Anion Gap 18     POC Glucose 585     POC Potassium      POC Sodium 129     POC CO2 22     QC OK? ok    POCT Glucose   Result Value Ref Range    Meter Glucose 409 (H) 70 - 110 mg/dL   POCT Glucose   Result Value Ref Range    Meter Glucose 255 (H) 70 - 110 mg/dL   POCT Glucose   Result Value Ref Range    Meter Glucose 217 (H) 70 - 110 mg/dL       RADIOLOGY:  Interpreted by Radiologist.  No orders to display       ------------------------- NURSING NOTES AND VITALS REVIEWED ---------------------------   The nursing notes within the ED encounter and vital signs as below have been reviewed. /86   Pulse 104   Temp 98.2 °F (36.8 °C)   Resp 18   Ht 5' 4\" (1.626 m)   Wt 143 lb (64.9 kg)   LMP 06/10/2018 (Approximate)   SpO2 100%   BMI 24.55 kg/m²   Oxygen Saturation Interpretation: Normal      ---------------------------------------------------PHYSICAL EXAM--------------------------------------      Constitutional/General: Alert and oriented x3, well appearing, non toxic in NAD  Head: NC/AT  Eyes: PERRL, EOMI  Mouth: Oropharynx clear, handling secretions, no trismus  Neck: Supple, full ROM,   Pulmonary: Lungs clear to auscultation bilaterally, no wheezes, rales, or rhonchi. Not in respiratory distress  Cardiovascular:  Regular rate and rhythm, no murmurs, gallops, or rubs. 2+ distal pulses  Abdomen: Soft, non tender, non distended,   Extremities: Moves all extremities x 4.  Warm and well perfused  Skin: warm and dry without rash  Neurologic: GCS 15,  Psych: Normal Affect      ------------------------------ ED COURSE/MEDICAL DECISION MAKING----------------------  Medications   0.9 % sodium chloride bolus (0 mLs Intravenous Stopped 8/28/18 4811)

## 2018-08-29 LAB
METER GLUCOSE: 217 MG/DL (ref 70–110)
METER GLUCOSE: 255 MG/DL (ref 70–110)

## 2018-08-29 PROCEDURE — 82962 GLUCOSE BLOOD TEST: CPT

## 2018-08-29 PROCEDURE — 96376 TX/PRO/DX INJ SAME DRUG ADON: CPT

## 2018-08-29 PROCEDURE — 6370000000 HC RX 637 (ALT 250 FOR IP): Performed by: EMERGENCY MEDICINE

## 2018-08-29 RX ORDER — HEPARIN SODIUM (PORCINE) LOCK FLUSH IV SOLN 100 UNIT/ML 100 UNIT/ML
SOLUTION INTRAVENOUS
Status: DISCONTINUED
Start: 2018-08-29 | End: 2018-08-29 | Stop reason: HOSPADM

## 2018-08-29 RX ORDER — 0.9 % SODIUM CHLORIDE 0.9 %
1000 INTRAVENOUS SOLUTION INTRAVENOUS ONCE
Status: DISCONTINUED | OUTPATIENT
Start: 2018-08-29 | End: 2018-08-29 | Stop reason: HOSPADM

## 2018-08-29 RX ADMIN — INSULIN HUMAN 4 UNITS: 100 INJECTION, SOLUTION PARENTERAL at 00:34

## 2018-09-04 ENCOUNTER — APPOINTMENT (OUTPATIENT)
Dept: ULTRASOUND IMAGING | Age: 28
End: 2018-09-04
Payer: COMMERCIAL

## 2018-09-04 ENCOUNTER — HOSPITAL ENCOUNTER (EMERGENCY)
Age: 28
Discharge: HOME | End: 2018-09-04
Payer: COMMERCIAL

## 2018-09-04 VITALS
HEIGHT: 64 IN | SYSTOLIC BLOOD PRESSURE: 150 MMHG | DIASTOLIC BLOOD PRESSURE: 90 MMHG | TEMPERATURE: 98.1 F | HEART RATE: 94 BPM | OXYGEN SATURATION: 99 % | RESPIRATION RATE: 16 BRPM | BODY MASS INDEX: 25.29 KG/M2 | WEIGHT: 148.13 LBS

## 2018-09-04 DIAGNOSIS — O03.9 MISCARRIAGE: Primary | ICD-10-CM

## 2018-09-04 DIAGNOSIS — N39.0 URINARY TRACT INFECTION WITHOUT HEMATURIA, SITE UNSPECIFIED: ICD-10-CM

## 2018-09-04 DIAGNOSIS — D64.9 ANEMIA, UNSPECIFIED TYPE: ICD-10-CM

## 2018-09-04 LAB
ALBUMIN SERPL-MCNC: 3.2 G/DL (ref 3.5–5.2)
ALP BLD-CCNC: 106 U/L (ref 35–104)
ALT SERPL-CCNC: 10 U/L (ref 0–32)
ANION GAP SERPL CALCULATED.3IONS-SCNC: 11 MMOL/L (ref 7–16)
AST SERPL-CCNC: 14 U/L (ref 0–31)
BACTERIA: ABNORMAL /HPF
BASOPHILS ABSOLUTE: 0.03 E9/L (ref 0–0.2)
BASOPHILS RELATIVE PERCENT: 0.5 % (ref 0–2)
BILIRUB SERPL-MCNC: <0.2 MG/DL (ref 0–1.2)
BILIRUBIN URINE: NEGATIVE
BLOOD, URINE: ABNORMAL
BUN BLDV-MCNC: 21 MG/DL (ref 6–20)
CALCIUM SERPL-MCNC: 8.7 MG/DL (ref 8.6–10.2)
CHLORIDE BLD-SCNC: 106 MMOL/L (ref 98–107)
CHP ED QC CHECK: NORMAL
CHP ED QC CHECK: NORMAL
CLARITY: ABNORMAL
CLUE CELLS: NORMAL
CO2: 21 MMOL/L (ref 22–29)
COLOR: YELLOW
CREAT SERPL-MCNC: 1.2 MG/DL (ref 0.5–1)
EOSINOPHILS ABSOLUTE: 0.24 E9/L (ref 0.05–0.5)
EOSINOPHILS RELATIVE PERCENT: 3.7 % (ref 0–6)
EPITHELIAL CELLS, UA: ABNORMAL /HPF
GFR AFRICAN AMERICAN: >60
GFR NON-AFRICAN AMERICAN: >60 ML/MIN/1.73
GLUCOSE BLD-MCNC: 154 MG/DL
GLUCOSE BLD-MCNC: 194 MG/DL (ref 74–109)
GLUCOSE URINE: 500 MG/DL
GONADOTROPIN, CHORIONIC (HCG) QUANT: 6205 MIU/ML
HCT VFR BLD CALC: 24.6 % (ref 34–48)
HEMOGLOBIN: 8 G/DL (ref 11.5–15.5)
IMMATURE GRANULOCYTES #: 0.02 E9/L
IMMATURE GRANULOCYTES %: 0.3 % (ref 0–5)
KETONES, URINE: ABNORMAL MG/DL
LEUKOCYTE ESTERASE, URINE: ABNORMAL
LYMPHOCYTES ABSOLUTE: 1.44 E9/L (ref 1.5–4)
LYMPHOCYTES RELATIVE PERCENT: 22.2 % (ref 20–42)
MCH RBC QN AUTO: 30.2 PG (ref 26–35)
MCHC RBC AUTO-ENTMCNC: 32.5 % (ref 32–34.5)
MCV RBC AUTO: 92.8 FL (ref 80–99.9)
METER GLUCOSE: 154 MG/DL (ref 70–110)
MONOCYTES ABSOLUTE: 0.39 E9/L (ref 0.1–0.95)
MONOCYTES RELATIVE PERCENT: 6 % (ref 2–12)
NEUTROPHILS ABSOLUTE: 4.36 E9/L (ref 1.8–7.3)
NEUTROPHILS RELATIVE PERCENT: 67.3 % (ref 43–80)
NITRITE, URINE: NEGATIVE
PDW BLD-RTO: 13.1 FL (ref 11.5–15)
PH UA: 6 (ref 5–9)
PLATELET # BLD: 311 E9/L (ref 130–450)
PMV BLD AUTO: 9 FL (ref 7–12)
POTASSIUM SERPL-SCNC: 4.4 MMOL/L (ref 3.5–5)
PREGNANCY TEST URINE, POC: NORMAL
PROTEIN UA: >=300 MG/DL
RBC # BLD: 2.65 E12/L (ref 3.5–5.5)
RBC UA: ABNORMAL /HPF (ref 0–2)
SODIUM BLD-SCNC: 138 MMOL/L (ref 132–146)
SOURCE WET PREP: NORMAL
SPECIFIC GRAVITY UA: 1.02 (ref 1–1.03)
TOTAL PROTEIN: 5.8 G/DL (ref 6.4–8.3)
TRICHOMONAS PREP: NORMAL
UROBILINOGEN, URINE: 0.2 E.U./DL
WBC # BLD: 6.5 E9/L (ref 4.5–11.5)
WBC UA: ABNORMAL /HPF (ref 0–5)
YEAST WET PREP: NORMAL

## 2018-09-04 PROCEDURE — 6360000002 HC RX W HCPCS

## 2018-09-04 PROCEDURE — 99284 EMERGENCY DEPT VISIT MOD MDM: CPT

## 2018-09-04 PROCEDURE — 85025 COMPLETE CBC W/AUTO DIFF WBC: CPT

## 2018-09-04 PROCEDURE — 81001 URINALYSIS AUTO W/SCOPE: CPT

## 2018-09-04 PROCEDURE — 2580000003 HC RX 258: Performed by: NURSE PRACTITIONER

## 2018-09-04 PROCEDURE — 82962 GLUCOSE BLOOD TEST: CPT

## 2018-09-04 PROCEDURE — 87210 SMEAR WET MOUNT SALINE/INK: CPT

## 2018-09-04 PROCEDURE — 36415 COLL VENOUS BLD VENIPUNCTURE: CPT

## 2018-09-04 PROCEDURE — 80053 COMPREHEN METABOLIC PANEL: CPT

## 2018-09-04 PROCEDURE — 6370000000 HC RX 637 (ALT 250 FOR IP): Performed by: NURSE PRACTITIONER

## 2018-09-04 PROCEDURE — 87491 CHLMYD TRACH DNA AMP PROBE: CPT

## 2018-09-04 PROCEDURE — 84702 CHORIONIC GONADOTROPIN TEST: CPT

## 2018-09-04 PROCEDURE — 87591 N.GONORRHOEAE DNA AMP PROB: CPT

## 2018-09-04 PROCEDURE — 76817 TRANSVAGINAL US OBSTETRIC: CPT

## 2018-09-04 RX ORDER — 0.9 % SODIUM CHLORIDE 0.9 %
1000 INTRAVENOUS SOLUTION INTRAVENOUS ONCE
Status: COMPLETED | OUTPATIENT
Start: 2018-09-04 | End: 2018-09-04

## 2018-09-04 RX ORDER — HEPARIN SODIUM (PORCINE) LOCK FLUSH IV SOLN 100 UNIT/ML 100 UNIT/ML
SOLUTION INTRAVENOUS
Status: COMPLETED
Start: 2018-09-04 | End: 2018-09-04

## 2018-09-04 RX ORDER — CEFDINIR 300 MG/1
300 CAPSULE ORAL ONCE
Status: COMPLETED | OUTPATIENT
Start: 2018-09-04 | End: 2018-09-04

## 2018-09-04 RX ORDER — CEFDINIR 300 MG/1
300 CAPSULE ORAL 2 TIMES DAILY
Qty: 20 CAPSULE | Refills: 0 | Status: SHIPPED | OUTPATIENT
Start: 2018-09-04 | End: 2018-09-14

## 2018-09-04 RX ADMIN — SODIUM CHLORIDE 1000 ML: 9 INJECTION, SOLUTION INTRAVENOUS at 20:28

## 2018-09-04 RX ADMIN — Medication: at 22:29

## 2018-09-04 RX ADMIN — CEFDINIR 300 MG: 300 CAPSULE ORAL at 22:16

## 2018-09-04 ASSESSMENT — PAIN DESCRIPTION - LOCATION: LOCATION: ABDOMEN

## 2018-09-04 ASSESSMENT — PAIN SCALES - GENERAL: PAINLEVEL_OUTOF10: 10

## 2018-09-04 ASSESSMENT — PAIN DESCRIPTION - ORIENTATION: ORIENTATION: LOWER

## 2018-09-04 ASSESSMENT — PAIN DESCRIPTION - DESCRIPTORS: DESCRIPTORS: CRAMPING

## 2018-09-04 ASSESSMENT — PAIN DESCRIPTION - PAIN TYPE: TYPE: ACUTE PAIN

## 2018-09-04 NOTE — ED PROVIDER NOTES
drink alcohol or use drugs. Family History: family history includes Asthma in her brother and mother; Diabetes in her mother; High Blood Pressure in her father and mother; Hypertension in her mother. The patients home medications have been reviewed. Allergies: Nicoderm [nicotine] and Toradol [ketorolac tromethamine]    ---------------------------------------------------PHYSICAL EXAM--------------------------------------    Constitutional/General: Alert and oriented x3, well appearing, non toxic in NAD  Head: Normocephalic and atraumatic  Eyes: PERRL, EOMI  Mouth: Oropharynx clear, handling secretions, no trismus  Neck: Supple, full ROM, non tender to palpation in the midline, no stridor, no crepitus, no meningeal signs  Pulmonary: Lungs clear to auscultation bilaterally, no wheezes, rales, or rhonchi. Not in respiratory distress  Cardiovascular:  Regular rate. Regular rhythm. No murmurs, gallops, or rubs. 2+ distal pulses  Chest: no chest wall tenderness  Abdomen: Soft. Non tender. Non distended. +BS. No rebound, guarding, or rigidity. No pulsatile masses appreciated. : Pelvic exam completed with female chaperone present. minimal vaginal discharge noted. No cervical motion tenderness noted. No adnexal tenderness. Cervix is closed. Cultures were obtained. Patient tolerated well. Musculoskeletal: Moves all extremities x 4. Warm and well perfused, no clubbing, cyanosis, or edema. Capillary refill <3 seconds  Skin: warm and dry. No rashes. Neurologic: GCS 15, CN 2-12 grossly intact, no focal deficits, symmetric strength 5/5 in the upper and lower extremities bilaterally  Psych: Normal Affect    -------------------------------------------------- RESULTS -------------------------------------------------  I have personally reviewed all laboratory and imaging results for this patient. Results are listed below.      LABS:  Results for orders placed or performed during the hospital encounter of 09/04/18

## 2018-09-06 ENCOUNTER — HOSPITAL ENCOUNTER (OUTPATIENT)
Age: 28
Discharge: HOME | End: 2018-09-06
Payer: COMMERCIAL

## 2018-09-06 LAB
HCT VFR BLD CALC: 24.6 % (ref 34–48)
HEMOGLOBIN: 7.9 G/DL (ref 11.5–15.5)
MCH RBC QN AUTO: 30.7 PG (ref 26–35)
MCHC RBC AUTO-ENTMCNC: 32.1 % (ref 32–34.5)
MCV RBC AUTO: 95.7 FL (ref 80–99.9)
PDW BLD-RTO: 13.2 FL (ref 11.5–15)
PLATELET # BLD: 336 E9/L (ref 130–450)
PMV BLD AUTO: 9.3 FL (ref 7–12)
RBC # BLD: 2.57 E12/L (ref 3.5–5.5)
WBC # BLD: 6.9 E9/L (ref 4.5–11.5)

## 2018-09-06 PROCEDURE — 36415 COLL VENOUS BLD VENIPUNCTURE: CPT

## 2018-09-06 PROCEDURE — 85027 COMPLETE CBC AUTOMATED: CPT

## 2018-09-06 PROCEDURE — 84702 CHORIONIC GONADOTROPIN TEST: CPT

## 2018-09-07 LAB
CHLAMYDIA TRACHOMATIS AMPLIFIED DET: NORMAL
N GONORRHOEAE AMPLIFIED DET: NORMAL

## 2018-09-08 LAB — HCG TUMOR MARKER: 2506 IU/L (ref 0–5)

## 2018-11-11 ENCOUNTER — HOSPITAL ENCOUNTER (EMERGENCY)
Age: 28
Discharge: HOME | End: 2018-11-11
Attending: EMERGENCY MEDICINE
Payer: COMMERCIAL

## 2018-11-11 VITALS
HEART RATE: 77 BPM | OXYGEN SATURATION: 99 % | SYSTOLIC BLOOD PRESSURE: 119 MMHG | TEMPERATURE: 97.5 F | WEIGHT: 133.19 LBS | HEIGHT: 64 IN | DIASTOLIC BLOOD PRESSURE: 79 MMHG | RESPIRATION RATE: 16 BRPM | BODY MASS INDEX: 22.74 KG/M2

## 2018-11-11 DIAGNOSIS — N39.0 URINARY TRACT INFECTION WITHOUT HEMATURIA, SITE UNSPECIFIED: ICD-10-CM

## 2018-11-11 DIAGNOSIS — R11.2 NAUSEA AND VOMITING, INTRACTABILITY OF VOMITING NOT SPECIFIED, UNSPECIFIED VOMITING TYPE: ICD-10-CM

## 2018-11-11 DIAGNOSIS — R73.9 HYPERGLYCEMIA: Primary | ICD-10-CM

## 2018-11-11 LAB
ALBUMIN SERPL-MCNC: 3.9 G/DL (ref 3.5–5.2)
ALP BLD-CCNC: 114 U/L (ref 35–104)
ALT SERPL-CCNC: 10 U/L (ref 0–32)
ANION GAP SERPL CALCULATED.3IONS-SCNC: 11 MMOL/L (ref 7–16)
AST SERPL-CCNC: 13 U/L (ref 0–31)
BACTERIA: ABNORMAL /HPF
BASOPHILS ABSOLUTE: 0.11 E9/L (ref 0–0.2)
BASOPHILS RELATIVE PERCENT: 1.9 % (ref 0–2)
BETA-HYDROXYBUTYRATE: 1.32 MMOL/L (ref 0.02–0.27)
BILIRUB SERPL-MCNC: 0.3 MG/DL (ref 0–1.2)
BILIRUBIN URINE: ABNORMAL
BLOOD, URINE: ABNORMAL
BUN BLDV-MCNC: 36 MG/DL (ref 6–20)
CALCIUM SERPL-MCNC: 9.2 MG/DL (ref 8.6–10.2)
CHLORIDE BLD-SCNC: 100 MMOL/L (ref 98–107)
CHP ED QC CHECK: YES
CHP ED QC CHECK: YES
CLARITY: ABNORMAL
CO2: 25 MMOL/L (ref 22–29)
COLOR: YELLOW
CREAT SERPL-MCNC: 1.5 MG/DL (ref 0.5–1)
EKG ATRIAL RATE: 100 BPM
EKG P AXIS: 61 DEGREES
EKG P-R INTERVAL: 112 MS
EKG Q-T INTERVAL: 322 MS
EKG QRS DURATION: 94 MS
EKG QTC CALCULATION (BAZETT): 415 MS
EKG R AXIS: 62 DEGREES
EKG T AXIS: -21 DEGREES
EKG VENTRICULAR RATE: 100 BPM
EOSINOPHILS ABSOLUTE: 0.19 E9/L (ref 0.05–0.5)
EOSINOPHILS RELATIVE PERCENT: 3.2 % (ref 0–6)
EPITHELIAL CELLS, UA: ABNORMAL /HPF
GFR AFRICAN AMERICAN: 51
GFR NON-AFRICAN AMERICAN: 51 ML/MIN/1.73
GLUCOSE BLD-MCNC: 121 MG/DL
GLUCOSE BLD-MCNC: 186 MG/DL (ref 74–99)
GLUCOSE URINE: 500 MG/DL
HCT VFR BLD CALC: 33.7 % (ref 34–48)
HEMOGLOBIN: 11.2 G/DL (ref 11.5–15.5)
IMMATURE GRANULOCYTES #: 0 E9/L
IMMATURE GRANULOCYTES %: 0 % (ref 0–5)
KETONES, URINE: 15 MG/DL
LACTIC ACID: 0.9 MMOL/L (ref 0.5–2.2)
LEUKOCYTE ESTERASE, URINE: ABNORMAL
LYMPHOCYTES ABSOLUTE: 2.89 E9/L (ref 1.5–4)
LYMPHOCYTES RELATIVE PERCENT: 48.7 % (ref 20–42)
MCH RBC QN AUTO: 29.6 PG (ref 26–35)
MCHC RBC AUTO-ENTMCNC: 33.2 % (ref 32–34.5)
MCV RBC AUTO: 89.2 FL (ref 80–99.9)
METER GLUCOSE: 121 MG/DL (ref 74–99)
METER GLUCOSE: 153 MG/DL (ref 74–99)
MONOCYTES ABSOLUTE: 0.45 E9/L (ref 0.1–0.95)
MONOCYTES RELATIVE PERCENT: 7.6 % (ref 2–12)
NEUTROPHILS ABSOLUTE: 2.29 E9/L (ref 1.8–7.3)
NEUTROPHILS RELATIVE PERCENT: 38.6 % (ref 43–80)
NITRITE, URINE: NEGATIVE
PDW BLD-RTO: 12.2 FL (ref 11.5–15)
PH UA: 5.5 (ref 5–9)
PH VENOUS: 7.32 (ref 7.3–7.42)
PLATELET # BLD: 304 E9/L (ref 130–450)
PMV BLD AUTO: 10.2 FL (ref 7–12)
POTASSIUM SERPL-SCNC: 3.4 MMOL/L (ref 3.5–5)
PREGNANCY TEST URINE, POC: NEGATIVE
PROTEIN UA: 100 MG/DL
RBC # BLD: 3.78 E12/L (ref 3.5–5.5)
RBC UA: ABNORMAL /HPF (ref 0–2)
SODIUM BLD-SCNC: 136 MMOL/L (ref 132–146)
SPECIFIC GRAVITY UA: >=1.03 (ref 1–1.03)
TOTAL PROTEIN: 6.9 G/DL (ref 6.4–8.3)
UROBILINOGEN, URINE: 0.2 E.U./DL
WBC # BLD: 5.9 E9/L (ref 4.5–11.5)
WBC UA: ABNORMAL /HPF (ref 0–5)

## 2018-11-11 PROCEDURE — 81001 URINALYSIS AUTO W/SCOPE: CPT

## 2018-11-11 PROCEDURE — 6370000000 HC RX 637 (ALT 250 FOR IP): Performed by: STUDENT IN AN ORGANIZED HEALTH CARE EDUCATION/TRAINING PROGRAM

## 2018-11-11 PROCEDURE — 99285 EMERGENCY DEPT VISIT HI MDM: CPT

## 2018-11-11 PROCEDURE — 82010 KETONE BODYS QUAN: CPT

## 2018-11-11 PROCEDURE — 85025 COMPLETE CBC W/AUTO DIFF WBC: CPT

## 2018-11-11 PROCEDURE — 2580000003 HC RX 258: Performed by: STUDENT IN AN ORGANIZED HEALTH CARE EDUCATION/TRAINING PROGRAM

## 2018-11-11 PROCEDURE — 83605 ASSAY OF LACTIC ACID: CPT

## 2018-11-11 PROCEDURE — 2580000003 HC RX 258: Performed by: EMERGENCY MEDICINE

## 2018-11-11 PROCEDURE — 82800 BLOOD PH: CPT

## 2018-11-11 PROCEDURE — 36415 COLL VENOUS BLD VENIPUNCTURE: CPT

## 2018-11-11 PROCEDURE — 6370000000 HC RX 637 (ALT 250 FOR IP): Performed by: EMERGENCY MEDICINE

## 2018-11-11 PROCEDURE — 82962 GLUCOSE BLOOD TEST: CPT

## 2018-11-11 PROCEDURE — 80053 COMPREHEN METABOLIC PANEL: CPT

## 2018-11-11 RX ORDER — CEPHALEXIN 250 MG/1
500 CAPSULE ORAL ONCE
Status: COMPLETED | OUTPATIENT
Start: 2018-11-11 | End: 2018-11-11

## 2018-11-11 RX ORDER — CEPHALEXIN 500 MG/1
500 CAPSULE ORAL 3 TIMES DAILY
Qty: 30 CAPSULE | Refills: 0 | Status: SHIPPED | OUTPATIENT
Start: 2018-11-11 | End: 2018-11-21

## 2018-11-11 RX ORDER — HEPARIN SODIUM (PORCINE) LOCK FLUSH IV SOLN 100 UNIT/ML 100 UNIT/ML
SOLUTION INTRAVENOUS
Status: DISCONTINUED
Start: 2018-11-11 | End: 2018-11-11 | Stop reason: HOSPADM

## 2018-11-11 RX ORDER — ONDANSETRON 4 MG/1
4 TABLET, ORALLY DISINTEGRATING ORAL EVERY 8 HOURS PRN
Qty: 10 TABLET | Refills: 0 | Status: SHIPPED | OUTPATIENT
Start: 2018-11-11 | End: 2019-05-13

## 2018-11-11 RX ORDER — 0.9 % SODIUM CHLORIDE 0.9 %
1000 INTRAVENOUS SOLUTION INTRAVENOUS ONCE
Status: COMPLETED | OUTPATIENT
Start: 2018-11-11 | End: 2018-11-11

## 2018-11-11 RX ORDER — ACETAMINOPHEN 325 MG/1
650 TABLET ORAL ONCE
Status: COMPLETED | OUTPATIENT
Start: 2018-11-11 | End: 2018-11-11

## 2018-11-11 RX ADMIN — SODIUM CHLORIDE 1000 ML: 9 INJECTION, SOLUTION INTRAVENOUS at 09:40

## 2018-11-11 RX ADMIN — SODIUM CHLORIDE 1000 ML: 9 INJECTION, SOLUTION INTRAVENOUS at 10:46

## 2018-11-11 RX ADMIN — CEPHALEXIN 500 MG: 250 CAPSULE ORAL at 10:43

## 2018-11-11 RX ADMIN — ACETAMINOPHEN 650 MG: 325 TABLET, FILM COATED ORAL at 10:43

## 2018-11-11 ASSESSMENT — ENCOUNTER SYMPTOMS
WHEEZING: 0
DIARRHEA: 0
VOMITING: 1
CONSTIPATION: 0
SHORTNESS OF BREATH: 1
NAUSEA: 1
COUGH: 0
CHEST TIGHTNESS: 1
COLOR CHANGE: 0
ABDOMINAL PAIN: 0

## 2018-11-11 ASSESSMENT — PAIN SCALES - GENERAL: PAINLEVEL_OUTOF10: 7

## 2018-11-11 ASSESSMENT — PAIN DESCRIPTION - PAIN TYPE: TYPE: ACUTE PAIN

## 2018-11-11 NOTE — ED PROVIDER NOTES
father and mother; Hypertension in her mother. The patients home medications have been reviewed.     Allergies: Nicoderm [nicotine] and Toradol [ketorolac tromethamine]    -------------------------------------------------- RESULTS -------------------------------------------------  Labs:  Results for orders placed or performed during the hospital encounter of 11/11/18   CBC auto differential   Result Value Ref Range    WBC 5.9 4.5 - 11.5 E9/L    RBC 3.78 3.50 - 5.50 E12/L    Hemoglobin 11.2 (L) 11.5 - 15.5 g/dL    Hematocrit 33.7 (L) 34.0 - 48.0 %    MCV 89.2 80.0 - 99.9 fL    MCH 29.6 26.0 - 35.0 pg    MCHC 33.2 32.0 - 34.5 %    RDW 12.2 11.5 - 15.0 fL    Platelets 754 613 - 865 E9/L    MPV 10.2 7.0 - 12.0 fL    Neutrophils % 38.6 (L) 43.0 - 80.0 %    Immature Granulocytes % 0.0 0.0 - 5.0 %    Lymphocytes % 48.7 (H) 20.0 - 42.0 %    Monocytes % 7.6 2.0 - 12.0 %    Eosinophils % 3.2 0.0 - 6.0 %    Basophils % 1.9 0.0 - 2.0 %    Neutrophils # 2.29 1.80 - 7.30 E9/L    Immature Granulocytes # 0.00 E9/L    Lymphocytes # 2.89 1.50 - 4.00 E9/L    Monocytes # 0.45 0.10 - 0.95 E9/L    Eosinophils # 0.19 0.05 - 0.50 E9/L    Basophils # 0.11 0.00 - 0.20 E9/L   Comprehensive Metabolic Panel   Result Value Ref Range    Sodium 136 132 - 146 mmol/L    Potassium 3.4 (L) 3.5 - 5.0 mmol/L    Chloride 100 98 - 107 mmol/L    CO2 25 22 - 29 mmol/L    Anion Gap 11 7 - 16 mmol/L    Glucose 186 (H) 74 - 99 mg/dL    BUN 36 (H) 6 - 20 mg/dL    CREATININE 1.5 (H) 0.5 - 1.0 mg/dL    GFR Non-African American 51 >=60 mL/min/1.73    GFR African American 51     Calcium 9.2 8.6 - 10.2 mg/dL    Total Protein 6.9 6.4 - 8.3 g/dL    Alb 3.9 3.5 - 5.2 g/dL    Total Bilirubin 0.3 0.0 - 1.2 mg/dL    Alkaline Phosphatase 114 (H) 35 - 104 U/L    ALT 10 0 - 32 U/L    AST 13 0 - 31 U/L   Lactic Acid, Plasma   Result Value Ref Range    Lactic Acid 0.9 0.5 - 2.2 mmol/L   pH, venous   Result Value Ref Range    pH, Khurram 7.32 7.30 - 7.42   Beta-Hydroxybutyrate

## 2018-12-14 ENCOUNTER — APPOINTMENT (OUTPATIENT)
Dept: GENERAL RADIOLOGY | Age: 28
DRG: 420 | End: 2018-12-14
Payer: COMMERCIAL

## 2018-12-14 ENCOUNTER — HOSPITAL ENCOUNTER (INPATIENT)
Age: 28
LOS: 5 days | Discharge: HOME | DRG: 420 | End: 2018-12-19
Attending: EMERGENCY MEDICINE | Admitting: HOSPITALIST
Payer: COMMERCIAL

## 2018-12-14 DIAGNOSIS — E86.0 DEHYDRATION: ICD-10-CM

## 2018-12-14 DIAGNOSIS — R10.84 GENERALIZED ABDOMINAL PAIN: ICD-10-CM

## 2018-12-14 DIAGNOSIS — R73.9 HYPERGLYCEMIA: Primary | ICD-10-CM

## 2018-12-14 DIAGNOSIS — R11.2 INTRACTABLE VOMITING WITH NAUSEA, UNSPECIFIED VOMITING TYPE: ICD-10-CM

## 2018-12-14 PROBLEM — N17.9 ACUTE RENAL FAILURE (HCC): Status: ACTIVE | Noted: 2018-12-14

## 2018-12-14 LAB
ALBUMIN SERPL-MCNC: 3.8 G/DL (ref 3.5–5.2)
ALP BLD-CCNC: 139 U/L (ref 35–104)
ALT SERPL-CCNC: 13 U/L (ref 0–32)
AMYLASE: 40 U/L (ref 20–100)
ANION GAP SERPL CALCULATED.3IONS-SCNC: 11 MMOL/L (ref 7–16)
ANION GAP SERPL CALCULATED.3IONS-SCNC: 17 MMOL/L (ref 7–16)
AST SERPL-CCNC: 14 U/L (ref 0–31)
BACTERIA: ABNORMAL /HPF
BASOPHILS ABSOLUTE: 0.15 E9/L (ref 0–0.2)
BASOPHILS RELATIVE PERCENT: 2 % (ref 0–2)
BETA-HYDROXYBUTYRATE: 2.67 MMOL/L (ref 0.02–0.27)
BILIRUB SERPL-MCNC: 0.2 MG/DL (ref 0–1.2)
BILIRUBIN URINE: NEGATIVE
BLOOD, URINE: ABNORMAL
BUN BLDV-MCNC: 15 MG/DL (ref 6–20)
BUN BLDV-MCNC: 23 MG/DL (ref 6–20)
CALCIUM SERPL-MCNC: 7.5 MG/DL (ref 8.6–10.2)
CALCIUM SERPL-MCNC: 8.9 MG/DL (ref 8.6–10.2)
CHLORIDE BLD-SCNC: 106 MMOL/L (ref 98–107)
CHLORIDE BLD-SCNC: 94 MMOL/L (ref 98–107)
CLARITY: CLEAR
CO2: 18 MMOL/L (ref 22–29)
CO2: 19 MMOL/L (ref 22–29)
CO2: 20 MMOL/L (ref 22–29)
CO2: 21 MMOL/L (ref 22–29)
CO2: 21 MMOL/L (ref 22–29)
COLOR: YELLOW
CREAT SERPL-MCNC: 0.8 MG/DL (ref 0.5–1)
CREAT SERPL-MCNC: 1.2 MG/DL (ref 0.5–1)
EKG ATRIAL RATE: 92 BPM
EKG P AXIS: 55 DEGREES
EKG P-R INTERVAL: 112 MS
EKG Q-T INTERVAL: 342 MS
EKG QRS DURATION: 88 MS
EKG QTC CALCULATION (BAZETT): 422 MS
EKG R AXIS: 60 DEGREES
EKG T AXIS: 46 DEGREES
EKG VENTRICULAR RATE: 92 BPM
EOSINOPHILS ABSOLUTE: 0.29 E9/L (ref 0.05–0.5)
EOSINOPHILS RELATIVE PERCENT: 3.9 % (ref 0–6)
EPITHELIAL CELLS, UA: ABNORMAL /HPF
GFR AFRICAN AMERICAN: >60
GFR NON-AFRICAN AMERICAN: 60 ML/MIN/1.73
GFR NON-AFRICAN AMERICAN: >60 ML/MIN/1.73
GLUCOSE BLD-MCNC: 119 MG/DL (ref 74–99)
GLUCOSE BLD-MCNC: 363 MG/DL
GLUCOSE BLD-MCNC: 363 MG/DL (ref 74–99)
GLUCOSE BLD-MCNC: 467 MG/DL
GLUCOSE BLD-MCNC: 467 MG/DL (ref 74–99)
GLUCOSE BLD-MCNC: 611 MG/DL (ref 74–99)
GLUCOSE BLD-MCNC: 650 MG/DL (ref 74–99)
GLUCOSE URINE: >=1000 MG/DL
HCT VFR BLD CALC: 33.1 % (ref 34–48)
HEMOGLOBIN: 11.2 G/DL (ref 11.5–15.5)
IMMATURE GRANULOCYTES #: 0.02 E9/L
IMMATURE GRANULOCYTES %: 0.3 % (ref 0–5)
KETONES, URINE: 15 MG/DL
LACTIC ACID: 1 MMOL/L (ref 0.5–2.2)
LACTIC ACID: 1.8 MMOL/L (ref 0.5–2.2)
LEUKOCYTE ESTERASE, URINE: NEGATIVE
LIPASE: 12 U/L (ref 13–60)
LIPASE: 15 U/L (ref 13–60)
LYMPHOCYTES ABSOLUTE: 1.9 E9/L (ref 1.5–4)
LYMPHOCYTES RELATIVE PERCENT: 25.3 % (ref 20–42)
MAGNESIUM: 1.7 MG/DL (ref 1.6–2.6)
MAGNESIUM: 2.4 MG/DL (ref 1.6–2.6)
MCH RBC QN AUTO: 29.7 PG (ref 26–35)
MCHC RBC AUTO-ENTMCNC: 33.8 % (ref 32–34.5)
MCV RBC AUTO: 87.8 FL (ref 80–99.9)
METER GLUCOSE: 129 MG/DL (ref 74–99)
METER GLUCOSE: 203 MG/DL (ref 74–99)
METER GLUCOSE: 218 MG/DL (ref 74–99)
METER GLUCOSE: 72 MG/DL (ref 74–99)
MONOCYTES ABSOLUTE: 0.4 E9/L (ref 0.1–0.95)
MONOCYTES RELATIVE PERCENT: 5.3 % (ref 2–12)
NEUTROPHILS ABSOLUTE: 4.74 E9/L (ref 1.8–7.3)
NEUTROPHILS RELATIVE PERCENT: 63.2 % (ref 43–80)
NITRITE, URINE: NEGATIVE
PDW BLD-RTO: 13.2 FL (ref 11.5–15)
PH UA: 6 (ref 5–9)
PH VENOUS: 7.3 (ref 7.3–7.42)
PHOSPHORUS: 1.7 MG/DL (ref 2.5–4.5)
PHOSPHORUS: 3.9 MG/DL (ref 2.5–4.5)
PLATELET # BLD: 333 E9/L (ref 130–450)
PMV BLD AUTO: 10.9 FL (ref 7–12)
POC ANION GAP: 12 MMOL/L (ref 7–16)
POC ANION GAP: 12 MMOL/L (ref 7–16)
POC ANION GAP: 14 MMOL/L (ref 7–16)
POC BUN: 19 MG/DL (ref 8–23)
POC BUN: 20 MG/DL (ref 8–23)
POC BUN: 23 MG/DL (ref 8–23)
POC CHLORIDE: 105 MMOL/L (ref 100–108)
POC CHLORIDE: 106 MMOL/L (ref 100–108)
POC CHLORIDE: 99 MMOL/L (ref 100–108)
POC CREATININE: 0.9 MG/DL (ref 0.5–1)
POC CREATININE: 1 MG/DL (ref 0.5–1)
POC CREATININE: 1.1 MG/DL (ref 0.5–1)
POC POTASSIUM: 3.5 MMOL/L (ref 3.5–5)
POC POTASSIUM: 4.4 MMOL/L (ref 3.5–5)
POC POTASSIUM: 4.6 MMOL/L (ref 3.5–5)
POC SODIUM: 132 MMOL/L (ref 132–146)
POC SODIUM: 135 MMOL/L (ref 132–146)
POC SODIUM: 139 MMOL/L (ref 132–146)
POTASSIUM SERPL-SCNC: 3.4 MMOL/L (ref 3.5–5)
POTASSIUM SERPL-SCNC: 4.9 MMOL/L (ref 3.5–5)
PREGNANCY TEST URINE, POC: NORMAL
PROTEIN UA: 30 MG/DL
RBC # BLD: 3.77 E12/L (ref 3.5–5.5)
RBC UA: ABNORMAL /HPF (ref 0–2)
SODIUM BLD-SCNC: 132 MMOL/L (ref 132–146)
SODIUM BLD-SCNC: 137 MMOL/L (ref 132–146)
SPECIFIC GRAVITY UA: 1.01 (ref 1–1.03)
TOTAL PROTEIN: 7.3 G/DL (ref 6.4–8.3)
UROBILINOGEN, URINE: 0.2 E.U./DL
WBC # BLD: 7.5 E9/L (ref 4.5–11.5)
WBC UA: ABNORMAL /HPF (ref 0–5)

## 2018-12-14 PROCEDURE — 83036 HEMOGLOBIN GLYCOSYLATED A1C: CPT

## 2018-12-14 PROCEDURE — 82150 ASSAY OF AMYLASE: CPT

## 2018-12-14 PROCEDURE — 99285 EMERGENCY DEPT VISIT HI MDM: CPT

## 2018-12-14 PROCEDURE — 96361 HYDRATE IV INFUSION ADD-ON: CPT

## 2018-12-14 PROCEDURE — 82947 ASSAY GLUCOSE BLOOD QUANT: CPT

## 2018-12-14 PROCEDURE — 74018 RADEX ABDOMEN 1 VIEW: CPT

## 2018-12-14 PROCEDURE — 81001 URINALYSIS AUTO W/SCOPE: CPT

## 2018-12-14 PROCEDURE — 71045 X-RAY EXAM CHEST 1 VIEW: CPT

## 2018-12-14 PROCEDURE — 6360000002 HC RX W HCPCS: Performed by: HOSPITALIST

## 2018-12-14 PROCEDURE — 80051 ELECTROLYTE PANEL: CPT

## 2018-12-14 PROCEDURE — 6370000000 HC RX 637 (ALT 250 FOR IP): Performed by: NURSE PRACTITIONER

## 2018-12-14 PROCEDURE — 84100 ASSAY OF PHOSPHORUS: CPT

## 2018-12-14 PROCEDURE — 82800 BLOOD PH: CPT

## 2018-12-14 PROCEDURE — 2580000003 HC RX 258: Performed by: STUDENT IN AN ORGANIZED HEALTH CARE EDUCATION/TRAINING PROGRAM

## 2018-12-14 PROCEDURE — 84520 ASSAY OF UREA NITROGEN: CPT

## 2018-12-14 PROCEDURE — 83690 ASSAY OF LIPASE: CPT

## 2018-12-14 PROCEDURE — 82010 KETONE BODYS QUAN: CPT

## 2018-12-14 PROCEDURE — 87081 CULTURE SCREEN ONLY: CPT

## 2018-12-14 PROCEDURE — 82962 GLUCOSE BLOOD TEST: CPT

## 2018-12-14 PROCEDURE — 2000000000 HC ICU R&B

## 2018-12-14 PROCEDURE — 96374 THER/PROPH/DIAG INJ IV PUSH: CPT

## 2018-12-14 PROCEDURE — 2580000003 HC RX 258: Performed by: HOSPITALIST

## 2018-12-14 PROCEDURE — 36415 COLL VENOUS BLD VENIPUNCTURE: CPT

## 2018-12-14 PROCEDURE — 6370000000 HC RX 637 (ALT 250 FOR IP): Performed by: INTERNAL MEDICINE

## 2018-12-14 PROCEDURE — 94761 N-INVAS EAR/PLS OXIMETRY MLT: CPT

## 2018-12-14 PROCEDURE — 6370000000 HC RX 637 (ALT 250 FOR IP): Performed by: STUDENT IN AN ORGANIZED HEALTH CARE EDUCATION/TRAINING PROGRAM

## 2018-12-14 PROCEDURE — 83605 ASSAY OF LACTIC ACID: CPT

## 2018-12-14 PROCEDURE — 82565 ASSAY OF CREATININE: CPT

## 2018-12-14 PROCEDURE — 80048 BASIC METABOLIC PNL TOTAL CA: CPT

## 2018-12-14 PROCEDURE — 85025 COMPLETE CBC W/AUTO DIFF WBC: CPT

## 2018-12-14 PROCEDURE — 99223 1ST HOSP IP/OBS HIGH 75: CPT | Performed by: HOSPITALIST

## 2018-12-14 PROCEDURE — 83735 ASSAY OF MAGNESIUM: CPT

## 2018-12-14 PROCEDURE — 80053 COMPREHEN METABOLIC PANEL: CPT

## 2018-12-14 PROCEDURE — 6370000000 HC RX 637 (ALT 250 FOR IP): Performed by: HOSPITALIST

## 2018-12-14 RX ORDER — 0.9 % SODIUM CHLORIDE 0.9 %
15 INTRAVENOUS SOLUTION INTRAVENOUS ONCE
Status: COMPLETED | OUTPATIENT
Start: 2018-12-14 | End: 2018-12-14

## 2018-12-14 RX ORDER — DEXTROSE MONOHYDRATE 25 G/50ML
12.5 INJECTION, SOLUTION INTRAVENOUS PRN
Status: DISCONTINUED | OUTPATIENT
Start: 2018-12-14 | End: 2018-12-18 | Stop reason: SDUPTHER

## 2018-12-14 RX ORDER — NICOTINE POLACRILEX 4 MG
15 LOZENGE BUCCAL PRN
Status: DISCONTINUED | OUTPATIENT
Start: 2018-12-14 | End: 2018-12-19 | Stop reason: HOSPADM

## 2018-12-14 RX ORDER — 0.9 % SODIUM CHLORIDE 0.9 %
1000 INTRAVENOUS SOLUTION INTRAVENOUS ONCE
Status: COMPLETED | OUTPATIENT
Start: 2018-12-14 | End: 2018-12-14

## 2018-12-14 RX ORDER — MAGNESIUM SULFATE 1 G/100ML
1 INJECTION INTRAVENOUS PRN
Status: DISCONTINUED | OUTPATIENT
Start: 2018-12-14 | End: 2018-12-14

## 2018-12-14 RX ORDER — POTASSIUM CHLORIDE 7.45 MG/ML
10 INJECTION INTRAVENOUS PRN
Status: DISCONTINUED | OUTPATIENT
Start: 2018-12-14 | End: 2018-12-14

## 2018-12-14 RX ORDER — HEPARIN SODIUM (PORCINE) LOCK FLUSH IV SOLN 100 UNIT/ML 100 UNIT/ML
SOLUTION INTRAVENOUS
Status: DISCONTINUED
Start: 2018-12-14 | End: 2018-12-15

## 2018-12-14 RX ORDER — DEXTROSE AND SODIUM CHLORIDE 5; .45 G/100ML; G/100ML
INJECTION, SOLUTION INTRAVENOUS CONTINUOUS PRN
Status: DISCONTINUED | OUTPATIENT
Start: 2018-12-14 | End: 2018-12-14

## 2018-12-14 RX ORDER — METRONIDAZOLE 500 MG/1
500 TABLET ORAL EVERY 8 HOURS SCHEDULED
Status: DISCONTINUED | OUTPATIENT
Start: 2018-12-14 | End: 2018-12-18

## 2018-12-14 RX ORDER — IBUPROFEN 800 MG/1
400 TABLET ORAL EVERY 6 HOURS PRN
Status: DISCONTINUED | OUTPATIENT
Start: 2018-12-14 | End: 2018-12-15

## 2018-12-14 RX ORDER — SODIUM CHLORIDE 9 MG/ML
INJECTION, SOLUTION INTRAVENOUS CONTINUOUS
Status: DISCONTINUED | OUTPATIENT
Start: 2018-12-14 | End: 2018-12-16

## 2018-12-14 RX ORDER — PANTOPRAZOLE SODIUM 40 MG/10ML
40 INJECTION, POWDER, LYOPHILIZED, FOR SOLUTION INTRAVENOUS DAILY
Status: DISCONTINUED | OUTPATIENT
Start: 2018-12-15 | End: 2018-12-18

## 2018-12-14 RX ORDER — INSULIN GLARGINE 100 [IU]/ML
25 INJECTION, SOLUTION SUBCUTANEOUS NIGHTLY
Status: DISCONTINUED | OUTPATIENT
Start: 2018-12-14 | End: 2018-12-16

## 2018-12-14 RX ORDER — DEXTROSE MONOHYDRATE 50 MG/ML
100 INJECTION, SOLUTION INTRAVENOUS PRN
Status: DISCONTINUED | OUTPATIENT
Start: 2018-12-14 | End: 2018-12-18 | Stop reason: SDUPTHER

## 2018-12-14 RX ADMIN — INSULIN GLARGINE 25 UNITS: 100 INJECTION, SOLUTION SUBCUTANEOUS at 23:24

## 2018-12-14 RX ADMIN — ENOXAPARIN SODIUM 40 MG: 40 INJECTION SUBCUTANEOUS at 21:11

## 2018-12-14 RX ADMIN — SODIUM CHLORIDE 1000 ML: 9 INJECTION, SOLUTION INTRAVENOUS at 11:30

## 2018-12-14 RX ADMIN — SODIUM CHLORIDE 948 ML: 9 INJECTION, SOLUTION INTRAVENOUS at 17:52

## 2018-12-14 RX ADMIN — METRONIDAZOLE 500 MG: 500 TABLET ORAL at 23:23

## 2018-12-14 RX ADMIN — SODIUM CHLORIDE 1000 ML: 900 INJECTION, SOLUTION INTRAVENOUS at 11:30

## 2018-12-14 RX ADMIN — SODIUM CHLORIDE 1000 ML: 9 INJECTION, SOLUTION INTRAVENOUS at 15:17

## 2018-12-14 RX ADMIN — IBUPROFEN 400 MG: 800 TABLET ORAL at 23:50

## 2018-12-14 RX ADMIN — INSULIN HUMAN 10 UNITS: 100 INJECTION, SOLUTION PARENTERAL at 14:28

## 2018-12-14 RX ADMIN — SODIUM CHLORIDE 4.74 UNITS/HR: 0.9 INJECTION INTRAVENOUS at 17:59

## 2018-12-14 ASSESSMENT — ENCOUNTER SYMPTOMS
SINUS PRESSURE: 0
DIARRHEA: 0
EYE REDNESS: 0
ABDOMINAL PAIN: 1
COUGH: 0
BACK PAIN: 0
WHEEZING: 0
SHORTNESS OF BREATH: 1
VOMITING: 0
SORE THROAT: 0
EYE PAIN: 0
NAUSEA: 1
EYE DISCHARGE: 0
ABDOMINAL DISTENTION: 0

## 2018-12-14 ASSESSMENT — PAIN SCALES - GENERAL
PAINLEVEL_OUTOF10: 0
PAINLEVEL_OUTOF10: 8
PAINLEVEL_OUTOF10: 0
PAINLEVEL_OUTOF10: 8

## 2018-12-14 ASSESSMENT — PAIN DESCRIPTION - LOCATION: LOCATION: GENERALIZED

## 2018-12-14 ASSESSMENT — PAIN DESCRIPTION - DESCRIPTORS: DESCRIPTORS: ACHING;DULL;DISCOMFORT

## 2018-12-14 ASSESSMENT — PAIN DESCRIPTION - ONSET: ONSET: ON-GOING

## 2018-12-14 ASSESSMENT — PAIN DESCRIPTION - PAIN TYPE: TYPE: ACUTE PAIN

## 2018-12-14 ASSESSMENT — PAIN DESCRIPTION - FREQUENCY: FREQUENCY: CONTINUOUS

## 2018-12-14 NOTE — ED PROVIDER NOTES
GFR  >60    POCT Glucose   Result Value Ref Range    Glucose 467 mg/dL   POCT Venous   Result Value Ref Range    POC Sodium 135 132 - 146 mmol/L    POC Potassium 4.4 3.5 - 5.0 mmol/L    POC Chloride 105 100 - 108 mmol/L    CO2 18 (L) 22 - 29 mmol/L    POC Anion Gap 12 7 - 16 mmol/L    POC Glucose 467 (H) 74 - 99 mg/dl    POC BUN 20 8 - 23 mg/dL    POC Creatinine 0.9 0.5 - 1.0 mg/dL    GFR Non-African American >60 >=60 mL/min/1.73    GFR African American >60    POCT Glucose   Result Value Ref Range    Glucose 363 mg/dL   POCT Venous   Result Value Ref Range    POC Sodium 139 132 - 146 mmol/L    POC Potassium 3.5 3.5 - 5.0 mmol/L    POC Chloride 106 100 - 108 mmol/L    CO2 19 (L) 22 - 29 mmol/L    POC Anion Gap 14 7 - 16 mmol/L    POC Glucose 363 (H) 74 - 99 mg/dl    POC BUN 19 8 - 23 mg/dL    POC Creatinine 1.0 0.5 - 1.0 mg/dL    GFR Non-African American >60 >=60 mL/min/1.73    GFR African American >60    EKG 12 Lead   Result Value Ref Range    Ventricular Rate 92 BPM    Atrial Rate 92 BPM    P-R Interval 112 ms    QRS Duration 88 ms    Q-T Interval 342 ms    QTc Calculation (Bazett) 422 ms    P Axis 55 degrees    R Axis 60 degrees    T Axis 46 degrees       Radiology  XR CHEST PORTABLE   Final Result   1. Unremarkable portable chest.            ------------------------- NURSING NOTES AND VITALS REVIEWED ---------------------------  Date / Time Roomed:  12/14/2018 10:08 AM  ED Bed Assignment:  05/05    The nursing notes within the ED encounter and vital signs as below have been reviewed.    Patient Vitals for the past 24 hrs:   BP Temp Temp src Pulse Resp SpO2 Height Weight   12/14/18 1614 (!) 142/89 97.8 °F (36.6 °C) Oral 93 14 100 % - -   12/14/18 1516 115/89 98.3 °F (36.8 °C) Oral 122 10 100 % - -   12/14/18 1313 133/85 98.5 °F (36.9 °C) Oral 87 11 98 % - -   12/14/18 1020 (!) 134/99 98.2 °F (36.8 °C) Oral 111 16 99 % 5' 4\" (1.626 m) 139 lb 4.8 oz (63.2 kg)       Oxygen Saturation Interpretation:

## 2018-12-15 ENCOUNTER — APPOINTMENT (OUTPATIENT)
Dept: CT IMAGING | Age: 28
DRG: 420 | End: 2018-12-15
Payer: COMMERCIAL

## 2018-12-15 LAB
ALBUMIN SERPL-MCNC: 3 G/DL (ref 3.5–5.2)
ALP BLD-CCNC: 92 U/L (ref 35–104)
ALT SERPL-CCNC: 5 U/L (ref 0–32)
ANION GAP SERPL CALCULATED.3IONS-SCNC: 8 MMOL/L (ref 7–16)
AST SERPL-CCNC: 13 U/L (ref 0–31)
BASOPHILS ABSOLUTE: 0.11 E9/L (ref 0–0.2)
BASOPHILS RELATIVE PERCENT: 1.5 % (ref 0–2)
BILIRUB SERPL-MCNC: 0.2 MG/DL (ref 0–1.2)
BUN BLDV-MCNC: 14 MG/DL (ref 6–20)
CALCIUM SERPL-MCNC: 7.9 MG/DL (ref 8.6–10.2)
CHLORIDE BLD-SCNC: 105 MMOL/L (ref 98–107)
CO2: 22 MMOL/L (ref 22–29)
CREAT SERPL-MCNC: 0.8 MG/DL (ref 0.5–1)
EOSINOPHILS ABSOLUTE: 0.35 E9/L (ref 0.05–0.5)
EOSINOPHILS RELATIVE PERCENT: 4.8 % (ref 0–6)
GFR AFRICAN AMERICAN: >60
GFR NON-AFRICAN AMERICAN: >60 ML/MIN/1.73
GLUCOSE BLD-MCNC: 105 MG/DL (ref 74–99)
HBA1C MFR BLD: 12.9 % (ref 4–5.6)
HCT VFR BLD CALC: 27 % (ref 34–48)
HEMOGLOBIN: 9 G/DL (ref 11.5–15.5)
IMMATURE GRANULOCYTES #: 0.02 E9/L
IMMATURE GRANULOCYTES %: 0.3 % (ref 0–5)
LYMPHOCYTES ABSOLUTE: 3.12 E9/L (ref 1.5–4)
LYMPHOCYTES RELATIVE PERCENT: 43 % (ref 20–42)
MCH RBC QN AUTO: 29.2 PG (ref 26–35)
MCHC RBC AUTO-ENTMCNC: 33.3 % (ref 32–34.5)
MCV RBC AUTO: 87.7 FL (ref 80–99.9)
METER GLUCOSE: 102 MG/DL (ref 74–99)
METER GLUCOSE: 139 MG/DL (ref 74–99)
METER GLUCOSE: 217 MG/DL (ref 74–99)
METER GLUCOSE: 275 MG/DL (ref 74–99)
METER GLUCOSE: 303 MG/DL (ref 74–99)
METER GLUCOSE: 45 MG/DL (ref 74–99)
MONOCYTES ABSOLUTE: 0.34 E9/L (ref 0.1–0.95)
MONOCYTES RELATIVE PERCENT: 4.7 % (ref 2–12)
NEUTROPHILS ABSOLUTE: 3.32 E9/L (ref 1.8–7.3)
NEUTROPHILS RELATIVE PERCENT: 45.7 % (ref 43–80)
PDW BLD-RTO: 12.9 FL (ref 11.5–15)
PLATELET # BLD: 303 E9/L (ref 130–450)
PMV BLD AUTO: 10.6 FL (ref 7–12)
POTASSIUM SERPL-SCNC: 3.8 MMOL/L (ref 3.5–5)
RBC # BLD: 3.08 E12/L (ref 3.5–5.5)
SODIUM BLD-SCNC: 135 MMOL/L (ref 132–146)
TOTAL PROTEIN: 5.5 G/DL (ref 6.4–8.3)
WBC # BLD: 7.3 E9/L (ref 4.5–11.5)

## 2018-12-15 PROCEDURE — 6370000000 HC RX 637 (ALT 250 FOR IP): Performed by: INTERNAL MEDICINE

## 2018-12-15 PROCEDURE — 1200000000 HC SEMI PRIVATE

## 2018-12-15 PROCEDURE — 36415 COLL VENOUS BLD VENIPUNCTURE: CPT

## 2018-12-15 PROCEDURE — 80053 COMPREHEN METABOLIC PANEL: CPT

## 2018-12-15 PROCEDURE — 2580000003 HC RX 258: Performed by: HOSPITALIST

## 2018-12-15 PROCEDURE — 74177 CT ABD & PELVIS W/CONTRAST: CPT

## 2018-12-15 PROCEDURE — 85025 COMPLETE CBC W/AUTO DIFF WBC: CPT

## 2018-12-15 PROCEDURE — 6370000000 HC RX 637 (ALT 250 FOR IP): Performed by: NURSE PRACTITIONER

## 2018-12-15 PROCEDURE — 6360000002 HC RX W HCPCS: Performed by: HOSPITALIST

## 2018-12-15 PROCEDURE — 82962 GLUCOSE BLOOD TEST: CPT

## 2018-12-15 PROCEDURE — 99233 SBSQ HOSP IP/OBS HIGH 50: CPT | Performed by: HOSPITALIST

## 2018-12-15 PROCEDURE — 6370000000 HC RX 637 (ALT 250 FOR IP): Performed by: HOSPITALIST

## 2018-12-15 PROCEDURE — G0008 ADMIN INFLUENZA VIRUS VAC: HCPCS | Performed by: HOSPITALIST

## 2018-12-15 PROCEDURE — 6360000004 HC RX CONTRAST MEDICATION: Performed by: RADIOLOGY

## 2018-12-15 PROCEDURE — 6360000002 HC RX W HCPCS: Performed by: INTERNAL MEDICINE

## 2018-12-15 PROCEDURE — 90686 IIV4 VACC NO PRSV 0.5 ML IM: CPT | Performed by: HOSPITALIST

## 2018-12-15 PROCEDURE — C9113 INJ PANTOPRAZOLE SODIUM, VIA: HCPCS | Performed by: INTERNAL MEDICINE

## 2018-12-15 RX ORDER — SODIUM CHLORIDE 9 MG/ML
INJECTION, SOLUTION INTRAVENOUS CONTINUOUS
Status: DISCONTINUED | OUTPATIENT
Start: 2018-12-15 | End: 2018-12-19 | Stop reason: HOSPADM

## 2018-12-15 RX ORDER — CIPROFLOXACIN 2 MG/ML
400 INJECTION, SOLUTION INTRAVENOUS EVERY 12 HOURS
Status: DISCONTINUED | OUTPATIENT
Start: 2018-12-15 | End: 2018-12-18

## 2018-12-15 RX ORDER — ACETAMINOPHEN 500 MG
1000 TABLET ORAL EVERY 8 HOURS PRN
Status: DISCONTINUED | OUTPATIENT
Start: 2018-12-15 | End: 2018-12-19 | Stop reason: HOSPADM

## 2018-12-15 RX ADMIN — METRONIDAZOLE 500 MG: 500 TABLET ORAL at 05:37

## 2018-12-15 RX ADMIN — ENOXAPARIN SODIUM 40 MG: 40 INJECTION SUBCUTANEOUS at 09:12

## 2018-12-15 RX ADMIN — INFLUENZA A VIRUS A/MICHIGAN/45/2015 X-275 (H1N1) ANTIGEN (FORMALDEHYDE INACTIVATED), INFLUENZA A VIRUS A/SINGAPORE/INFIMH-16-0019/2016 IVR-186 (H3N2) ANTIGEN (FORMALDEHYDE INACTIVATED), INFLUENZA B VIRUS B/PHUKET/3073/2013 ANTIGEN (FORMALDEHYDE INACTIVATED), AND INFLUENZA B VIRUS B/MARYLAND/15/2016 BX-69A ANTIGEN (FORMALDEHYDE INACTIVATED) 0.5 ML: 15; 15; 15; 15 INJECTION, SUSPENSION INTRAMUSCULAR at 09:12

## 2018-12-15 RX ADMIN — IOPAMIDOL 80 ML: 755 INJECTION, SOLUTION INTRAVENOUS at 13:48

## 2018-12-15 RX ADMIN — SODIUM CHLORIDE: 9 INJECTION, SOLUTION INTRAVENOUS at 08:51

## 2018-12-15 RX ADMIN — INSULIN GLARGINE 25 UNITS: 100 INJECTION, SOLUTION SUBCUTANEOUS at 21:51

## 2018-12-15 RX ADMIN — METRONIDAZOLE 500 MG: 500 TABLET ORAL at 21:47

## 2018-12-15 RX ADMIN — PANTOPRAZOLE SODIUM 40 MG: 40 INJECTION, POWDER, FOR SOLUTION INTRAVENOUS at 09:12

## 2018-12-15 RX ADMIN — CIPROFLOXACIN 400 MG: 2 INJECTION, SOLUTION INTRAVENOUS at 14:34

## 2018-12-15 RX ADMIN — INSULIN LISPRO 5 UNITS: 100 INJECTION, SOLUTION INTRAVENOUS; SUBCUTANEOUS at 14:29

## 2018-12-15 RX ADMIN — INSULIN LISPRO 6 UNITS: 100 INJECTION, SOLUTION INTRAVENOUS; SUBCUTANEOUS at 21:51

## 2018-12-15 RX ADMIN — INSULIN LISPRO 9 UNITS: 100 INJECTION, SOLUTION INTRAVENOUS; SUBCUTANEOUS at 14:31

## 2018-12-15 RX ADMIN — ACETAMINOPHEN 1000 MG: 500 TABLET, FILM COATED ORAL at 08:56

## 2018-12-15 RX ADMIN — METRONIDAZOLE 500 MG: 500 TABLET ORAL at 14:39

## 2018-12-15 RX ADMIN — IOHEXOL 50 ML: 240 INJECTION, SOLUTION INTRATHECAL; INTRAVASCULAR; INTRAVENOUS; ORAL at 13:47

## 2018-12-15 RX ADMIN — SODIUM CHLORIDE: 9 INJECTION, SOLUTION INTRAVENOUS at 14:39

## 2018-12-15 ASSESSMENT — PAIN DESCRIPTION - PAIN TYPE
TYPE: ACUTE PAIN
TYPE: ACUTE PAIN

## 2018-12-15 ASSESSMENT — PAIN DESCRIPTION - DESCRIPTORS
DESCRIPTORS: ACHING;DISCOMFORT;DULL
DESCRIPTORS: DISCOMFORT

## 2018-12-15 ASSESSMENT — PAIN SCALES - GENERAL
PAINLEVEL_OUTOF10: 10
PAINLEVEL_OUTOF10: 8
PAINLEVEL_OUTOF10: 6
PAINLEVEL_OUTOF10: 8
PAINLEVEL_OUTOF10: 2

## 2018-12-15 ASSESSMENT — PAIN DESCRIPTION - LOCATION
LOCATION: ABDOMEN
LOCATION: ABDOMEN

## 2018-12-15 NOTE — PROGRESS NOTES
Nurse to nurse called to receiving nurse on 3rd floor.  Updated that npo for ct since 0915 and requires 3hourd before test Patient aware of transport and new bed assigned

## 2018-12-15 NOTE — PLAN OF CARE
Patient complaining of abdominal pain, asked patient what she takes for pain, she prefers ibuprofen over acetaminophen. States she is not allergic to ibuprofen. Ibuprofen ordered.

## 2018-12-15 NOTE — CONSULTS
12/15/2018    MCH 29.2 12/15/2018    MCHC 33.3 12/15/2018    RDW 12.9 12/15/2018     12/15/2018    MPV 10.6 12/15/2018     Lab Results   Component Value Date     12/15/2018    K 3.8 12/15/2018    K 4.2 08/28/2018     12/15/2018    CO2 22 12/15/2018    BUN 14 12/15/2018    CREATININE 0.8 12/15/2018    CALCIUM 7.9 12/15/2018    PROT 5.5 12/15/2018    LABALBU 3.0 12/15/2018    LABALBU 4.1 05/18/2012    BILITOT 0.2 12/15/2018    ALKPHOS 92 12/15/2018    AST 13 12/15/2018    ALT 5 12/15/2018     Lab Results   Component Value Date    LIPASE 12 12/14/2018     Lab Results   Component Value Date    AMYLASE 40 12/14/2018         ASSESSMENT/PLAN:  Diarrhea - chronic diarrhea possibly related to diabetic enteropathy or SIBO. Await stool studies/await CT scan. Plan for nonemergent, likely outpatient colonoscopy. May suspect gastroparesis given extremely poor diabetic control at home. Consider nuclear medicine gastric emptying study in addition to above described CT scan. Diabetes mellitus/DKA - very poor control at home - patient will require improved nausea control to avoid further development of diabetic complications. Defer overall diabetes/DKA management to admitting    Above has been discussed with the patient and all questions answered to her satisfaction. She is agreeable with the plan as delineated. Thank you for the opportunity to see this patient in consultation    NOTE:  This report was transcribed using voice recognition software. Every effort was made to ensure accuracy; however, inadvertent computerized transcription errors may be present.     Birttney Alexander MD  12/15/2018  10:14 AM

## 2018-12-15 NOTE — PROGRESS NOTES
Patient reported to nurse that she feels like her blood glucose is low. Upon checking patient. Her blood glucose 45. Patient refusing oral glucose and requesting snacks from kitchen.  This nurse provided patient with duyen morales and LEIGH

## 2018-12-15 NOTE — PLAN OF CARE
Problem: Discharge Planning:  Goal: Participates in care planning  Participates in care planning  Outcome: Met This Shift      Problem: Pain:  Goal: Recognizes and communicates pain  Recognizes and communicates pain  Outcome: Met This Shift    Goal: Control of chronic pain  Control of chronic pain  Outcome: Met This Shift      Problem: Serum Glucose Level - Abnormal:  Goal: Ability to maintain appropriate glucose levels will improve to within specified parameters  Ability to maintain appropriate glucose levels will improve to within specified parameters  Outcome: Met This Shift

## 2018-12-16 LAB
ALBUMIN SERPL-MCNC: 3 G/DL (ref 3.5–5.2)
ALP BLD-CCNC: 88 U/L (ref 35–104)
ALT SERPL-CCNC: 8 U/L (ref 0–32)
ANION GAP SERPL CALCULATED.3IONS-SCNC: 7 MMOL/L (ref 7–16)
AST SERPL-CCNC: 10 U/L (ref 0–31)
BASOPHILS ABSOLUTE: 0.08 E9/L (ref 0–0.2)
BASOPHILS RELATIVE PERCENT: 1.3 % (ref 0–2)
BILIRUB SERPL-MCNC: <0.2 MG/DL (ref 0–1.2)
BUN BLDV-MCNC: 14 MG/DL (ref 6–20)
CALCIUM SERPL-MCNC: 8 MG/DL (ref 8.6–10.2)
CHLORIDE BLD-SCNC: 105 MMOL/L (ref 98–107)
CO2: 21 MMOL/L (ref 22–29)
CREAT SERPL-MCNC: 1.1 MG/DL (ref 0.5–1)
EOSINOPHILS ABSOLUTE: 0.19 E9/L (ref 0.05–0.5)
EOSINOPHILS RELATIVE PERCENT: 3 % (ref 0–6)
GFR AFRICAN AMERICAN: >60
GFR NON-AFRICAN AMERICAN: >60 ML/MIN/1.73
GLUCOSE BLD-MCNC: 256 MG/DL (ref 74–99)
HCT VFR BLD CALC: 25.5 % (ref 34–48)
HEMOGLOBIN: 8.5 G/DL (ref 11.5–15.5)
IMMATURE GRANULOCYTES #: 0.02 E9/L
IMMATURE GRANULOCYTES %: 0.3 % (ref 0–5)
LYMPHOCYTES ABSOLUTE: 2.03 E9/L (ref 1.5–4)
LYMPHOCYTES RELATIVE PERCENT: 32 % (ref 20–42)
MCH RBC QN AUTO: 29.2 PG (ref 26–35)
MCHC RBC AUTO-ENTMCNC: 33.3 % (ref 32–34.5)
MCV RBC AUTO: 87.6 FL (ref 80–99.9)
METER GLUCOSE: 150 MG/DL (ref 74–99)
METER GLUCOSE: 179 MG/DL (ref 74–99)
METER GLUCOSE: 200 MG/DL (ref 74–99)
METER GLUCOSE: 242 MG/DL (ref 74–99)
METER GLUCOSE: 346 MG/DL (ref 74–99)
METER GLUCOSE: 63 MG/DL (ref 74–99)
MONOCYTES ABSOLUTE: 0.35 E9/L (ref 0.1–0.95)
MONOCYTES RELATIVE PERCENT: 5.5 % (ref 2–12)
MRSA CULTURE ONLY: NORMAL
NEUTROPHILS ABSOLUTE: 3.67 E9/L (ref 1.8–7.3)
NEUTROPHILS RELATIVE PERCENT: 57.9 % (ref 43–80)
PDW BLD-RTO: 12.7 FL (ref 11.5–15)
PLATELET # BLD: 257 E9/L (ref 130–450)
PMV BLD AUTO: 10.5 FL (ref 7–12)
POTASSIUM SERPL-SCNC: 4.3 MMOL/L (ref 3.5–5)
RBC # BLD: 2.91 E12/L (ref 3.5–5.5)
SODIUM BLD-SCNC: 133 MMOL/L (ref 132–146)
TOTAL PROTEIN: 5.2 G/DL (ref 6.4–8.3)
WBC # BLD: 6.3 E9/L (ref 4.5–11.5)

## 2018-12-16 PROCEDURE — 85025 COMPLETE CBC W/AUTO DIFF WBC: CPT

## 2018-12-16 PROCEDURE — 80053 COMPREHEN METABOLIC PANEL: CPT

## 2018-12-16 PROCEDURE — 6360000002 HC RX W HCPCS: Performed by: INTERNAL MEDICINE

## 2018-12-16 PROCEDURE — 82962 GLUCOSE BLOOD TEST: CPT

## 2018-12-16 PROCEDURE — 51798 US URINE CAPACITY MEASURE: CPT

## 2018-12-16 PROCEDURE — 6370000000 HC RX 637 (ALT 250 FOR IP): Performed by: INTERNAL MEDICINE

## 2018-12-16 PROCEDURE — 36415 COLL VENOUS BLD VENIPUNCTURE: CPT

## 2018-12-16 PROCEDURE — C9113 INJ PANTOPRAZOLE SODIUM, VIA: HCPCS | Performed by: INTERNAL MEDICINE

## 2018-12-16 PROCEDURE — 99233 SBSQ HOSP IP/OBS HIGH 50: CPT | Performed by: HOSPITALIST

## 2018-12-16 PROCEDURE — 6370000000 HC RX 637 (ALT 250 FOR IP): Performed by: HOSPITALIST

## 2018-12-16 PROCEDURE — 6370000000 HC RX 637 (ALT 250 FOR IP): Performed by: NURSE PRACTITIONER

## 2018-12-16 PROCEDURE — 1200000000 HC SEMI PRIVATE

## 2018-12-16 PROCEDURE — 6360000002 HC RX W HCPCS: Performed by: HOSPITALIST

## 2018-12-16 RX ORDER — MORPHINE SULFATE 2 MG/ML
1 INJECTION, SOLUTION INTRAMUSCULAR; INTRAVENOUS
Status: DISCONTINUED | OUTPATIENT
Start: 2018-12-16 | End: 2018-12-18

## 2018-12-16 RX ORDER — SENNA AND DOCUSATE SODIUM 50; 8.6 MG/1; MG/1
2 TABLET, FILM COATED ORAL 2 TIMES DAILY
Status: DISCONTINUED | OUTPATIENT
Start: 2018-12-16 | End: 2018-12-18

## 2018-12-16 RX ORDER — INSULIN GLARGINE 100 [IU]/ML
28 INJECTION, SOLUTION SUBCUTANEOUS NIGHTLY
Status: DISCONTINUED | OUTPATIENT
Start: 2018-12-16 | End: 2018-12-19 | Stop reason: HOSPADM

## 2018-12-16 RX ORDER — DOCUSATE SODIUM 100 MG/1
100 CAPSULE, LIQUID FILLED ORAL 2 TIMES DAILY
Status: DISCONTINUED | OUTPATIENT
Start: 2018-12-16 | End: 2018-12-16

## 2018-12-16 RX ORDER — POLYETHYLENE GLYCOL 3350 17 G/17G
17 POWDER, FOR SOLUTION ORAL 2 TIMES DAILY
Status: DISCONTINUED | OUTPATIENT
Start: 2018-12-16 | End: 2018-12-18

## 2018-12-16 RX ORDER — MORPHINE SULFATE 2 MG/ML
2 INJECTION, SOLUTION INTRAMUSCULAR; INTRAVENOUS
Status: DISCONTINUED | OUTPATIENT
Start: 2018-12-16 | End: 2018-12-18

## 2018-12-16 RX ADMIN — ENOXAPARIN SODIUM 40 MG: 40 INJECTION SUBCUTANEOUS at 08:01

## 2018-12-16 RX ADMIN — MORPHINE SULFATE 2 MG: 2 INJECTION, SOLUTION INTRAMUSCULAR; INTRAVENOUS at 09:32

## 2018-12-16 RX ADMIN — CIPROFLOXACIN 400 MG: 2 INJECTION, SOLUTION INTRAVENOUS at 14:08

## 2018-12-16 RX ADMIN — MORPHINE SULFATE 2 MG: 2 INJECTION, SOLUTION INTRAMUSCULAR; INTRAVENOUS at 18:41

## 2018-12-16 RX ADMIN — PANTOPRAZOLE SODIUM 40 MG: 40 INJECTION, POWDER, FOR SOLUTION INTRAVENOUS at 07:40

## 2018-12-16 RX ADMIN — SENNOSIDES AND DOCUSATE SODIUM 2 TABLET: 8.6; 5 TABLET ORAL at 13:58

## 2018-12-16 RX ADMIN — CIPROFLOXACIN 400 MG: 2 INJECTION, SOLUTION INTRAVENOUS at 02:10

## 2018-12-16 RX ADMIN — METRONIDAZOLE 500 MG: 500 TABLET ORAL at 21:50

## 2018-12-16 RX ADMIN — METRONIDAZOLE 500 MG: 500 TABLET ORAL at 06:14

## 2018-12-16 RX ADMIN — POLYETHYLENE GLYCOL 3350 17 G: 17 POWDER, FOR SOLUTION ORAL at 13:37

## 2018-12-16 RX ADMIN — INSULIN LISPRO 12 UNITS: 100 INJECTION, SOLUTION INTRAVENOUS; SUBCUTANEOUS at 16:37

## 2018-12-16 RX ADMIN — INSULIN LISPRO 8 UNITS: 100 INJECTION, SOLUTION INTRAVENOUS; SUBCUTANEOUS at 07:41

## 2018-12-16 RX ADMIN — INSULIN LISPRO 6 UNITS: 100 INJECTION, SOLUTION INTRAVENOUS; SUBCUTANEOUS at 07:46

## 2018-12-16 RX ADMIN — POLYETHYLENE GLYCOL 3350 17 G: 17 POWDER, FOR SOLUTION ORAL at 20:26

## 2018-12-16 RX ADMIN — SENNOSIDES AND DOCUSATE SODIUM 2 TABLET: 8.6; 5 TABLET ORAL at 20:26

## 2018-12-16 RX ADMIN — MORPHINE SULFATE 2 MG: 2 INJECTION, SOLUTION INTRAMUSCULAR; INTRAVENOUS at 14:01

## 2018-12-16 RX ADMIN — METRONIDAZOLE 500 MG: 500 TABLET ORAL at 13:45

## 2018-12-16 RX ADMIN — MORPHINE SULFATE 2 MG: 2 INJECTION, SOLUTION INTRAMUSCULAR; INTRAVENOUS at 21:52

## 2018-12-16 RX ADMIN — INSULIN GLARGINE 28 UNITS: 100 INJECTION, SOLUTION SUBCUTANEOUS at 20:35

## 2018-12-16 ASSESSMENT — PAIN DESCRIPTION - PROGRESSION: CLINICAL_PROGRESSION: NOT CHANGED

## 2018-12-16 ASSESSMENT — PAIN SCALES - GENERAL
PAINLEVEL_OUTOF10: 3
PAINLEVEL_OUTOF10: 2
PAINLEVEL_OUTOF10: 9
PAINLEVEL_OUTOF10: 8
PAINLEVEL_OUTOF10: 5

## 2018-12-16 ASSESSMENT — PAIN DESCRIPTION - PAIN TYPE: TYPE: ACUTE PAIN

## 2018-12-16 ASSESSMENT — PAIN DESCRIPTION - LOCATION: LOCATION: ABDOMEN

## 2018-12-16 ASSESSMENT — PAIN DESCRIPTION - ONSET: ONSET: GRADUAL

## 2018-12-16 ASSESSMENT — PAIN DESCRIPTION - FREQUENCY: FREQUENCY: INTERMITTENT

## 2018-12-16 ASSESSMENT — PAIN DESCRIPTION - DESCRIPTORS: DESCRIPTORS: ACHING

## 2018-12-17 ENCOUNTER — APPOINTMENT (OUTPATIENT)
Dept: NUCLEAR MEDICINE | Age: 28
DRG: 420 | End: 2018-12-17
Payer: COMMERCIAL

## 2018-12-17 ENCOUNTER — ANESTHESIA EVENT (OUTPATIENT)
Dept: ENDOSCOPY | Age: 28
DRG: 420 | End: 2018-12-17
Payer: COMMERCIAL

## 2018-12-17 PROBLEM — K31.84 DIABETIC GASTROPARESIS ASSOCIATED WITH TYPE 1 DIABETES MELLITUS (HCC): Status: ACTIVE | Noted: 2018-12-17

## 2018-12-17 PROBLEM — R10.84 GENERALIZED ABDOMINAL PAIN: Status: ACTIVE | Noted: 2018-12-17

## 2018-12-17 PROBLEM — R19.7 DIARRHEA IN ADULT PATIENT: Status: ACTIVE | Noted: 2018-12-17

## 2018-12-17 PROBLEM — E10.43 DIABETIC GASTROPARESIS ASSOCIATED WITH TYPE 1 DIABETES MELLITUS (HCC): Status: ACTIVE | Noted: 2018-12-17

## 2018-12-17 LAB
ANION GAP SERPL CALCULATED.3IONS-SCNC: 7 MMOL/L (ref 7–16)
BASOPHILS ABSOLUTE: 0.06 E9/L (ref 0–0.2)
BASOPHILS RELATIVE PERCENT: 1.3 % (ref 0–2)
BUN BLDV-MCNC: 16 MG/DL (ref 6–20)
CALCIUM SERPL-MCNC: 7.7 MG/DL (ref 8.6–10.2)
CHLORIDE BLD-SCNC: 107 MMOL/L (ref 98–107)
CO2: 22 MMOL/L (ref 22–29)
CREAT SERPL-MCNC: 1 MG/DL (ref 0.5–1)
CRYPTOSPORIDIUM ANTIGEN STOOL: NORMAL
EOSINOPHILS ABSOLUTE: 0.24 E9/L (ref 0.05–0.5)
EOSINOPHILS RELATIVE PERCENT: 5.1 % (ref 0–6)
FERRITIN: 31 NG/ML
FOLATE: 9.6 NG/ML (ref 4.8–24.2)
GFR AFRICAN AMERICAN: >60
GFR NON-AFRICAN AMERICAN: >60 ML/MIN/1.73
GIARDIA ANTIGEN STOOL: NORMAL
GLUCOSE BLD-MCNC: 234 MG/DL (ref 74–99)
HCT VFR BLD CALC: 24.1 % (ref 34–48)
HEMOGLOBIN: 7.9 G/DL (ref 11.5–15.5)
IMMATURE GRANULOCYTES #: 0.01 E9/L
IMMATURE GRANULOCYTES %: 0.2 % (ref 0–5)
IRON SATURATION: 41 % (ref 15–50)
IRON: 55 MCG/DL (ref 37–145)
LYMPHOCYTES ABSOLUTE: 2.04 E9/L (ref 1.5–4)
LYMPHOCYTES RELATIVE PERCENT: 43.2 % (ref 20–42)
MCH RBC QN AUTO: 29.3 PG (ref 26–35)
MCHC RBC AUTO-ENTMCNC: 32.8 % (ref 32–34.5)
MCV RBC AUTO: 89.3 FL (ref 80–99.9)
METER GLUCOSE: 174 MG/DL (ref 74–99)
METER GLUCOSE: 276 MG/DL (ref 74–99)
METER GLUCOSE: 56 MG/DL (ref 74–99)
METER GLUCOSE: 57 MG/DL (ref 74–99)
METER GLUCOSE: 63 MG/DL (ref 74–99)
MONOCYTES ABSOLUTE: 0.34 E9/L (ref 0.1–0.95)
MONOCYTES RELATIVE PERCENT: 7.2 % (ref 2–12)
NEUTROPHILS ABSOLUTE: 2.03 E9/L (ref 1.8–7.3)
NEUTROPHILS RELATIVE PERCENT: 43 % (ref 43–80)
OCCULT BLOOD DIAGNOSTIC: NORMAL
PDW BLD-RTO: 13.2 FL (ref 11.5–15)
PLATELET # BLD: 216 E9/L (ref 130–450)
PMV BLD AUTO: 10.3 FL (ref 7–12)
POTASSIUM SERPL-SCNC: 4.1 MMOL/L (ref 3.5–5)
RBC # BLD: 2.7 E12/L (ref 3.5–5.5)
ROTAVIRUS ANTIGEN: NORMAL
SODIUM BLD-SCNC: 136 MMOL/L (ref 132–146)
TOTAL IRON BINDING CAPACITY: 135 MCG/DL (ref 250–450)
VITAMIN B-12: 623 PG/ML (ref 211–946)
WBC # BLD: 4.7 E9/L (ref 4.5–11.5)

## 2018-12-17 PROCEDURE — 6370000000 HC RX 637 (ALT 250 FOR IP): Performed by: NURSE PRACTITIONER

## 2018-12-17 PROCEDURE — 3430000000 HC RX DIAGNOSTIC RADIOPHARMACEUTICAL: Performed by: RADIOLOGY

## 2018-12-17 PROCEDURE — 87328 CRYPTOSPORIDIUM AG IA: CPT

## 2018-12-17 PROCEDURE — 36415 COLL VENOUS BLD VENIPUNCTURE: CPT

## 2018-12-17 PROCEDURE — 6360000002 HC RX W HCPCS: Performed by: INTERNAL MEDICINE

## 2018-12-17 PROCEDURE — 80048 BASIC METABOLIC PNL TOTAL CA: CPT

## 2018-12-17 PROCEDURE — 6360000002 HC RX W HCPCS: Performed by: HOSPITALIST

## 2018-12-17 PROCEDURE — 6370000000 HC RX 637 (ALT 250 FOR IP): Performed by: INTERNAL MEDICINE

## 2018-12-17 PROCEDURE — A9541 TC99M SULFUR COLLOID: HCPCS | Performed by: RADIOLOGY

## 2018-12-17 PROCEDURE — 87425 ROTAVIRUS AG IA: CPT

## 2018-12-17 PROCEDURE — 83550 IRON BINDING TEST: CPT

## 2018-12-17 PROCEDURE — 82728 ASSAY OF FERRITIN: CPT

## 2018-12-17 PROCEDURE — 6370000000 HC RX 637 (ALT 250 FOR IP): Performed by: HOSPITALIST

## 2018-12-17 PROCEDURE — 83516 IMMUNOASSAY NONANTIBODY: CPT

## 2018-12-17 PROCEDURE — 82746 ASSAY OF FOLIC ACID SERUM: CPT

## 2018-12-17 PROCEDURE — 89055 LEUKOCYTE ASSESSMENT FECAL: CPT

## 2018-12-17 PROCEDURE — 36591 DRAW BLOOD OFF VENOUS DEVICE: CPT

## 2018-12-17 PROCEDURE — 85025 COMPLETE CBC W/AUTO DIFF WBC: CPT

## 2018-12-17 PROCEDURE — 82272 OCCULT BLD FECES 1-3 TESTS: CPT

## 2018-12-17 PROCEDURE — 82784 ASSAY IGA/IGD/IGG/IGM EACH: CPT

## 2018-12-17 PROCEDURE — 2580000003 HC RX 258: Performed by: HOSPITALIST

## 2018-12-17 PROCEDURE — C9113 INJ PANTOPRAZOLE SODIUM, VIA: HCPCS | Performed by: INTERNAL MEDICINE

## 2018-12-17 PROCEDURE — 2580000003 HC RX 258

## 2018-12-17 PROCEDURE — 99233 SBSQ HOSP IP/OBS HIGH 50: CPT | Performed by: INTERNAL MEDICINE

## 2018-12-17 PROCEDURE — 82962 GLUCOSE BLOOD TEST: CPT

## 2018-12-17 PROCEDURE — 78264 GASTRIC EMPTYING IMG STUDY: CPT

## 2018-12-17 PROCEDURE — 87045 FECES CULTURE AEROBIC BACT: CPT

## 2018-12-17 PROCEDURE — 82607 VITAMIN B-12: CPT

## 2018-12-17 PROCEDURE — 87329 GIARDIA AG IA: CPT

## 2018-12-17 PROCEDURE — 1200000000 HC SEMI PRIVATE

## 2018-12-17 PROCEDURE — 83540 ASSAY OF IRON: CPT

## 2018-12-17 RX ORDER — POLYETHYLENE GLYCOL 3350, SODIUM CHLORIDE, SODIUM BICARBONATE, POTASSIUM CHLORIDE 420; 11.2; 5.72; 1.48 G/4L; G/4L; G/4L; G/4L
4000 POWDER, FOR SOLUTION ORAL ONCE
Status: COMPLETED | OUTPATIENT
Start: 2018-12-17 | End: 2018-12-17

## 2018-12-17 RX ORDER — SODIUM CHLORIDE 0.9 % (FLUSH) 0.9 %
SYRINGE (ML) INJECTION
Status: COMPLETED
Start: 2018-12-17 | End: 2018-12-17

## 2018-12-17 RX ADMIN — SENNOSIDES AND DOCUSATE SODIUM 2 TABLET: 8.6; 5 TABLET ORAL at 21:34

## 2018-12-17 RX ADMIN — SODIUM CHLORIDE: 9 INJECTION, SOLUTION INTRAVENOUS at 23:38

## 2018-12-17 RX ADMIN — POLYETHYLENE GLYCOL-3350, SODIUM CHLORIDE, POTASSIUM CHLORIDE AND SODIUM BICARBONATE 4000 ML: 420; 11.2; 5.72; 1.48 POWDER, FOR SOLUTION ORAL at 15:46

## 2018-12-17 RX ADMIN — MORPHINE SULFATE 2 MG: 2 INJECTION, SOLUTION INTRAMUSCULAR; INTRAVENOUS at 21:34

## 2018-12-17 RX ADMIN — SENNOSIDES AND DOCUSATE SODIUM 2 TABLET: 8.6; 5 TABLET ORAL at 14:25

## 2018-12-17 RX ADMIN — INSULIN LISPRO 3 UNITS: 100 INJECTION, SOLUTION INTRAVENOUS; SUBCUTANEOUS at 09:51

## 2018-12-17 RX ADMIN — Medication 15 G: at 23:34

## 2018-12-17 RX ADMIN — MORPHINE SULFATE 2 MG: 2 INJECTION, SOLUTION INTRAMUSCULAR; INTRAVENOUS at 09:51

## 2018-12-17 RX ADMIN — CIPROFLOXACIN 400 MG: 2 INJECTION, SOLUTION INTRAVENOUS at 14:25

## 2018-12-17 RX ADMIN — MORPHINE SULFATE 2 MG: 2 INJECTION, SOLUTION INTRAMUSCULAR; INTRAVENOUS at 17:45

## 2018-12-17 RX ADMIN — BISACODYL 10 MG: 5 TABLET, COATED ORAL at 14:25

## 2018-12-17 RX ADMIN — MORPHINE SULFATE 2 MG: 2 INJECTION, SOLUTION INTRAMUSCULAR; INTRAVENOUS at 06:41

## 2018-12-17 RX ADMIN — INSULIN LISPRO 9 UNITS: 100 INJECTION, SOLUTION INTRAVENOUS; SUBCUTANEOUS at 17:40

## 2018-12-17 RX ADMIN — POLYETHYLENE GLYCOL 3350 17 G: 17 POWDER, FOR SOLUTION ORAL at 21:34

## 2018-12-17 RX ADMIN — METRONIDAZOLE 500 MG: 500 TABLET ORAL at 21:34

## 2018-12-17 RX ADMIN — ENOXAPARIN SODIUM 40 MG: 40 INJECTION SUBCUTANEOUS at 09:51

## 2018-12-17 RX ADMIN — METRONIDAZOLE 500 MG: 500 TABLET ORAL at 05:54

## 2018-12-17 RX ADMIN — CIPROFLOXACIN 400 MG: 2 INJECTION, SOLUTION INTRAVENOUS at 03:14

## 2018-12-17 RX ADMIN — INSULIN LISPRO 8 UNITS: 100 INJECTION, SOLUTION INTRAVENOUS; SUBCUTANEOUS at 17:41

## 2018-12-17 RX ADMIN — METRONIDAZOLE 500 MG: 500 TABLET ORAL at 14:25

## 2018-12-17 RX ADMIN — PANTOPRAZOLE SODIUM 40 MG: 40 INJECTION, POWDER, FOR SOLUTION INTRAVENOUS at 09:46

## 2018-12-17 RX ADMIN — MORPHINE SULFATE 2 MG: 2 INJECTION, SOLUTION INTRAMUSCULAR; INTRAVENOUS at 14:25

## 2018-12-17 RX ADMIN — Medication 2 MILLICURIE: at 07:21

## 2018-12-17 RX ADMIN — POLYETHYLENE GLYCOL 3350 17 G: 17 POWDER, FOR SOLUTION ORAL at 14:25

## 2018-12-17 RX ADMIN — Medication 10 ML: at 17:45

## 2018-12-17 ASSESSMENT — PAIN DESCRIPTION - LOCATION
LOCATION: ABDOMEN
LOCATION: ABDOMEN

## 2018-12-17 ASSESSMENT — PAIN SCALES - GENERAL
PAINLEVEL_OUTOF10: 3
PAINLEVEL_OUTOF10: 0
PAINLEVEL_OUTOF10: 3
PAINLEVEL_OUTOF10: 8
PAINLEVEL_OUTOF10: 3
PAINLEVEL_OUTOF10: 8
PAINLEVEL_OUTOF10: 7
PAINLEVEL_OUTOF10: 10
PAINLEVEL_OUTOF10: 9

## 2018-12-17 ASSESSMENT — PAIN DESCRIPTION - FREQUENCY
FREQUENCY: CONTINUOUS
FREQUENCY: CONTINUOUS

## 2018-12-17 ASSESSMENT — PAIN DESCRIPTION - DESCRIPTORS
DESCRIPTORS: ACHING;CRAMPING;DISCOMFORT
DESCRIPTORS: ACHING;DISCOMFORT

## 2018-12-17 ASSESSMENT — PAIN DESCRIPTION - ONSET
ONSET: ON-GOING
ONSET: ON-GOING

## 2018-12-17 ASSESSMENT — PAIN DESCRIPTION - PROGRESSION
CLINICAL_PROGRESSION: NOT CHANGED
CLINICAL_PROGRESSION: NOT CHANGED

## 2018-12-17 ASSESSMENT — PAIN DESCRIPTION - PAIN TYPE
TYPE: ACUTE PAIN
TYPE: CHRONIC PAIN

## 2018-12-17 NOTE — PROGRESS NOTES
[address] [place], [place] [address]ist   Progress Note    Admitting Date and Time: 12/14/2018 10:08 AM  Admit Dx: Acute renal failure ([address] [N17.9]    Subjective:    Pt feels worse today with more abdominal pain today. She states she awoke with pain at 5 AM and has had diarrhea all day. Per RN: she did hypoglycemia late morning with sugar 57 so no coverage and scheduled insulin for meals.      sodium chloride flush        polyethylene glycol-electrolytes  4,000 mL Oral Once    bisacodyl  10 mg Oral Once    insulin lispro  8 Units Subcutaneous TID WC    insulin glargine  28 Units Subcutaneous Nightly    polyethylene glycol  17 g Oral BID    sennosides-docusate sodium  2 tablet Oral BID    ciprofloxacin  400 mg Intravenous Q12H    enoxaparin  40 mg Subcutaneous Daily    insulin lispro  0-18 Units Subcutaneous TID WC    insulin lispro  0-9 Units Subcutaneous Nightly    metroNIDAZOLE  500 mg Oral 3 times per day    pantoprazole  40 mg Intravenous Daily       morphine 1 mg Q3H PRN   Or     morphine 2 mg Q3H PRN   acetaminophen 1,000 mg Q8H PRN   dextrose 12.5 g PRN   glucose 15 g PRN   dextrose 12.5 g PRN   glucagon (rDNA) 1 mg PRN   dextrose 100 mL/hr PRN        Objective:    /70   Pulse 89   Temp 98.4 °F (36.9 °C)   Resp 16   Ht 5' 4\" (1.626 m)   Wt 155 lb 4.8 oz (70.4 kg)   LMP 10/16/2018   SpO2 100%   BMI 26.66 kg/m²   General Appearance: alert and oriented to person, place and time and in no acute distress  Skin: warm and dry  Head: normocephalic and atraumatic  Eyes: pupils equal, round, and reactive to light, extraocular eye movements intact, conjunctivae normal  Neck: neck supple and non tender without mass   Pulmonary/Chest: clear to auscultation bilaterally- no wheezes, rales or rhonchi, normal air movement, no respiratory distress  Cardiovascular: normal rate, normal S1 and S2 and no carotid bruits  Abdomen: soft, non-tender, non-distended, normal bowel sounds, no masses or Narrative:     Source: STOOL       Site:              OCCULT BLOOD DIAGNOSTIC [[id number]] Collected: 12/17/18 0316   Updated: 12/17/18 1301    Specimen Source: Stool     Occult Blood Diagnostic --    Occult Blood test result is negative. *   *   Normal range: negative    Narrative:     Source: STOOL       Site:            Radiology:   NM GASTRIC EMPTYING   Final Result   Relative prolonged gastric emptying. CT ABDOMEN PELVIS W IV CONTRAST Additional Contrast? Oral   Final Result   1. Questionable mass in the a sending colon. 2. Bowel wall thickening of the distal terminal ileum. Consider   inflammatory bowel disease. There is a large amount of retained stool. 3. No evidence of appendicitis. 4. On the initial examination the patient had a markedly distended   urinary bladder. On the repeat examination the bladder was almost   empty. XR ABDOMEN (KUB) (SINGLE AP VIEW)   Final Result   1. No acute abdominal abnormality. XR CHEST PORTABLE   Final Result   1. Unremarkable portable chest.              Assessment:  Principal Problem:    Acute renal failure (HCC)  Active Problems:    Diabetes mellitus type 1, uncontrolled ([address]    Diabetic gastroparesis associated with type 1 diabetes mellitus ([address]    Diarrhea in adult patient    Generalized abdominal pain  Resolved Problems:    * No resolved hospital problems. *      Plan:  1. Acute renal failure secondary to dehydration--mild BC on admission with creatinine 1.2 which improved on IVF to 0.8 but now hovering around 1.0-1.1.  2. Diffuse abdominal pain with diarrhea as well as abnormal CT scan--GI now planning to pursue EGD and colonoscopy tomorrow. I concur at she never was able to get outpatient colonoscopy for abnormal CT from Sept 2017 which showed transmural edema and thickening of ascending and transverse colon. Stool studies negative as above. C diff could not be sent secondary to formed stool.  Since has had diarrheas all day today will order stool for C diff. Continue empiric ceftriaxone and metronidazole for colitis. 3. Uncontrolled type I DM with partial DKA earlier this AM--today had mild hypoglycemia and not given coverage or insulin with meals at lunch. Will give half dose Lantus tonight since will be NPO tonight. 4. Diabetic gastroparesis--uncontrolled type II DM likely etiology as evidenced by A1c of 12.9% . Needs improved sugar control. Hold off on Reglan for now. 5. Urinary retention--may be related to uncontrolled type II DM. May need Reyes. Case discussed with Dr. Alana Hurd of GI on floor. NOTE: This report was transcribed using voice recognition software.  Every effort was made to ensure accuracy; however, inadvertent computerized transcription errors may be present.     Electronically signed by Viji Banda MD on 12/17/2018 at 2:02 PM

## 2018-12-18 ENCOUNTER — ANESTHESIA (OUTPATIENT)
Dept: ENDOSCOPY | Age: 28
DRG: 420 | End: 2018-12-18
Payer: COMMERCIAL

## 2018-12-18 VITALS — OXYGEN SATURATION: 100 % | DIASTOLIC BLOOD PRESSURE: 66 MMHG | SYSTOLIC BLOOD PRESSURE: 111 MMHG

## 2018-12-18 LAB
ANION GAP SERPL CALCULATED.3IONS-SCNC: 6 MMOL/L (ref 7–16)
BASOPHILS ABSOLUTE: 0.07 E9/L (ref 0–0.2)
BASOPHILS RELATIVE PERCENT: 1.1 % (ref 0–2)
BUN BLDV-MCNC: 13 MG/DL (ref 6–20)
CALCIUM SERPL-MCNC: 7.8 MG/DL (ref 8.6–10.2)
CHLORIDE BLD-SCNC: 106 MMOL/L (ref 98–107)
CO2: 22 MMOL/L (ref 22–29)
CREAT SERPL-MCNC: 0.8 MG/DL (ref 0.5–1)
EOSINOPHILS ABSOLUTE: 0.25 E9/L (ref 0.05–0.5)
EOSINOPHILS RELATIVE PERCENT: 3.8 % (ref 0–6)
GFR AFRICAN AMERICAN: >60
GFR NON-AFRICAN AMERICAN: >60 ML/MIN/1.73
GLUCOSE BLD-MCNC: 202 MG/DL (ref 74–99)
HCT VFR BLD CALC: 26.6 % (ref 34–48)
HEMOGLOBIN: 8.7 G/DL (ref 11.5–15.5)
IMMATURE GRANULOCYTES #: 0.03 E9/L
IMMATURE GRANULOCYTES %: 0.5 % (ref 0–5)
LYMPHOCYTES ABSOLUTE: 1.87 E9/L (ref 1.5–4)
LYMPHOCYTES RELATIVE PERCENT: 28.4 % (ref 20–42)
MCH RBC QN AUTO: 29.1 PG (ref 26–35)
MCHC RBC AUTO-ENTMCNC: 32.7 % (ref 32–34.5)
MCV RBC AUTO: 89 FL (ref 80–99.9)
METER GLUCOSE: 106 MG/DL (ref 74–99)
METER GLUCOSE: 155 MG/DL (ref 74–99)
METER GLUCOSE: 180 MG/DL (ref 74–99)
METER GLUCOSE: 196 MG/DL (ref 74–99)
METER GLUCOSE: 198 MG/DL (ref 74–99)
METER GLUCOSE: 203 MG/DL (ref 74–99)
METER GLUCOSE: 238 MG/DL (ref 74–99)
METER GLUCOSE: 55 MG/DL (ref 74–99)
METER GLUCOSE: <40 MG/DL (ref 74–99)
MONOCYTES ABSOLUTE: 0.32 E9/L (ref 0.1–0.95)
MONOCYTES RELATIVE PERCENT: 4.9 % (ref 2–12)
NEUTROPHILS ABSOLUTE: 4.04 E9/L (ref 1.8–7.3)
NEUTROPHILS RELATIVE PERCENT: 61.3 % (ref 43–80)
PDW BLD-RTO: 13.2 FL (ref 11.5–15)
PLATELET # BLD: 255 E9/L (ref 130–450)
PMV BLD AUTO: 10.9 FL (ref 7–12)
POTASSIUM SERPL-SCNC: 4.2 MMOL/L (ref 3.5–5)
RBC # BLD: 2.99 E12/L (ref 3.5–5.5)
SODIUM BLD-SCNC: 134 MMOL/L (ref 132–146)
WBC # BLD: 6.6 E9/L (ref 4.5–11.5)
WHITE BLOOD CELLS (WBC), STOOL: NORMAL

## 2018-12-18 PROCEDURE — 7100000011 HC PHASE II RECOVERY - ADDTL 15 MIN: Performed by: INTERNAL MEDICINE

## 2018-12-18 PROCEDURE — 6360000002 HC RX W HCPCS: Performed by: NURSE ANESTHETIST, CERTIFIED REGISTERED

## 2018-12-18 PROCEDURE — 0DBL8ZX EXCISION OF TRANSVERSE COLON, VIA NATURAL OR ARTIFICIAL OPENING ENDOSCOPIC, DIAGNOSTIC: ICD-10-PCS | Performed by: INTERNAL MEDICINE

## 2018-12-18 PROCEDURE — 7100000010 HC PHASE II RECOVERY - FIRST 15 MIN: Performed by: INTERNAL MEDICINE

## 2018-12-18 PROCEDURE — 88305 TISSUE EXAM BY PATHOLOGIST: CPT

## 2018-12-18 PROCEDURE — 6360000002 HC RX W HCPCS: Performed by: HOSPITALIST

## 2018-12-18 PROCEDURE — 2580000003 HC RX 258: Performed by: NURSE PRACTITIONER

## 2018-12-18 PROCEDURE — 2709999900 HC NON-CHARGEABLE SUPPLY: Performed by: INTERNAL MEDICINE

## 2018-12-18 PROCEDURE — 1200000000 HC SEMI PRIVATE

## 2018-12-18 PROCEDURE — 0DBB8ZX EXCISION OF ILEUM, VIA NATURAL OR ARTIFICIAL OPENING ENDOSCOPIC, DIAGNOSTIC: ICD-10-PCS | Performed by: INTERNAL MEDICINE

## 2018-12-18 PROCEDURE — 85025 COMPLETE CBC W/AUTO DIFF WBC: CPT

## 2018-12-18 PROCEDURE — 3609012400 HC EGD TRANSORAL BIOPSY SINGLE/MULTIPLE: Performed by: INTERNAL MEDICINE

## 2018-12-18 PROCEDURE — 88342 IMHCHEM/IMCYTCHM 1ST ANTB: CPT

## 2018-12-18 PROCEDURE — 82962 GLUCOSE BLOOD TEST: CPT

## 2018-12-18 PROCEDURE — C9113 INJ PANTOPRAZOLE SODIUM, VIA: HCPCS | Performed by: INTERNAL MEDICINE

## 2018-12-18 PROCEDURE — 6370000000 HC RX 637 (ALT 250 FOR IP): Performed by: INTERNAL MEDICINE

## 2018-12-18 PROCEDURE — 3700000000 HC ANESTHESIA ATTENDED CARE: Performed by: INTERNAL MEDICINE

## 2018-12-18 PROCEDURE — 36415 COLL VENOUS BLD VENIPUNCTURE: CPT

## 2018-12-18 PROCEDURE — 0DBH8ZX EXCISION OF CECUM, VIA NATURAL OR ARTIFICIAL OPENING ENDOSCOPIC, DIAGNOSTIC: ICD-10-PCS | Performed by: INTERNAL MEDICINE

## 2018-12-18 PROCEDURE — 3609010300 HC COLONOSCOPY W/BIOPSY SINGLE/MULTIPLE: Performed by: INTERNAL MEDICINE

## 2018-12-18 PROCEDURE — 99232 SBSQ HOSP IP/OBS MODERATE 35: CPT | Performed by: INTERNAL MEDICINE

## 2018-12-18 PROCEDURE — 2580000003 HC RX 258: Performed by: NURSE ANESTHETIST, CERTIFIED REGISTERED

## 2018-12-18 PROCEDURE — 0DBP8ZX EXCISION OF RECTUM, VIA NATURAL OR ARTIFICIAL OPENING ENDOSCOPIC, DIAGNOSTIC: ICD-10-PCS | Performed by: INTERNAL MEDICINE

## 2018-12-18 PROCEDURE — 6360000002 HC RX W HCPCS: Performed by: INTERNAL MEDICINE

## 2018-12-18 PROCEDURE — 2580000003 HC RX 258: Performed by: HOSPITALIST

## 2018-12-18 PROCEDURE — 3700000001 HC ADD 15 MINUTES (ANESTHESIA): Performed by: INTERNAL MEDICINE

## 2018-12-18 PROCEDURE — 0DB68ZX EXCISION OF STOMACH, VIA NATURAL OR ARTIFICIAL OPENING ENDOSCOPIC, DIAGNOSTIC: ICD-10-PCS | Performed by: INTERNAL MEDICINE

## 2018-12-18 PROCEDURE — 2580000003 HC RX 258

## 2018-12-18 PROCEDURE — 80048 BASIC METABOLIC PNL TOTAL CA: CPT

## 2018-12-18 RX ORDER — OXYCODONE HYDROCHLORIDE AND ACETAMINOPHEN 5; 325 MG/1; MG/1
1 TABLET ORAL EVERY 4 HOURS PRN
Status: DISCONTINUED | OUTPATIENT
Start: 2018-12-18 | End: 2018-12-19 | Stop reason: HOSPADM

## 2018-12-18 RX ORDER — PROPOFOL 10 MG/ML
INJECTION, EMULSION INTRAVENOUS CONTINUOUS PRN
Status: DISCONTINUED | OUTPATIENT
Start: 2018-12-18 | End: 2018-12-18 | Stop reason: SDUPTHER

## 2018-12-18 RX ORDER — PANTOPRAZOLE SODIUM 40 MG/1
40 TABLET, DELAYED RELEASE ORAL
Status: DISCONTINUED | OUTPATIENT
Start: 2018-12-19 | End: 2018-12-19 | Stop reason: HOSPADM

## 2018-12-18 RX ORDER — PROPOFOL 10 MG/ML
INJECTION, EMULSION INTRAVENOUS PRN
Status: DISCONTINUED | OUTPATIENT
Start: 2018-12-18 | End: 2018-12-18 | Stop reason: SDUPTHER

## 2018-12-18 RX ORDER — FENTANYL CITRATE 50 UG/ML
INJECTION, SOLUTION INTRAMUSCULAR; INTRAVENOUS PRN
Status: DISCONTINUED | OUTPATIENT
Start: 2018-12-18 | End: 2018-12-18 | Stop reason: SDUPTHER

## 2018-12-18 RX ORDER — SODIUM CHLORIDE 0.9 % (FLUSH) 0.9 %
SYRINGE (ML) INJECTION
Status: COMPLETED
Start: 2018-12-18 | End: 2018-12-18

## 2018-12-18 RX ORDER — NICOTINE POLACRILEX 4 MG
15 LOZENGE BUCCAL PRN
Status: DISCONTINUED | OUTPATIENT
Start: 2018-12-18 | End: 2018-12-19 | Stop reason: HOSPADM

## 2018-12-18 RX ORDER — SODIUM CHLORIDE 9 MG/ML
INJECTION, SOLUTION INTRAVENOUS CONTINUOUS PRN
Status: DISCONTINUED | OUTPATIENT
Start: 2018-12-18 | End: 2018-12-18 | Stop reason: SDUPTHER

## 2018-12-18 RX ORDER — DEXTROSE MONOHYDRATE 50 MG/ML
100 INJECTION, SOLUTION INTRAVENOUS PRN
Status: DISCONTINUED | OUTPATIENT
Start: 2018-12-18 | End: 2018-12-19 | Stop reason: HOSPADM

## 2018-12-18 RX ORDER — DEXTROSE MONOHYDRATE 25 G/50ML
12.5 INJECTION, SOLUTION INTRAVENOUS PRN
Status: DISCONTINUED | OUTPATIENT
Start: 2018-12-18 | End: 2018-12-19 | Stop reason: HOSPADM

## 2018-12-18 RX ORDER — OXYCODONE HYDROCHLORIDE AND ACETAMINOPHEN 5; 325 MG/1; MG/1
2 TABLET ORAL EVERY 4 HOURS PRN
Status: DISCONTINUED | OUTPATIENT
Start: 2018-12-18 | End: 2018-12-19 | Stop reason: HOSPADM

## 2018-12-18 RX ORDER — MIDAZOLAM HYDROCHLORIDE 1 MG/ML
INJECTION INTRAMUSCULAR; INTRAVENOUS PRN
Status: DISCONTINUED | OUTPATIENT
Start: 2018-12-18 | End: 2018-12-18 | Stop reason: SDUPTHER

## 2018-12-18 RX ADMIN — MORPHINE SULFATE 2 MG: 2 INJECTION, SOLUTION INTRAMUSCULAR; INTRAVENOUS at 06:42

## 2018-12-18 RX ADMIN — PANTOPRAZOLE SODIUM 40 MG: 40 INJECTION, POWDER, FOR SOLUTION INTRAVENOUS at 10:12

## 2018-12-18 RX ADMIN — PROPOFOL 60 MG: 10 INJECTION, EMULSION INTRAVENOUS at 17:10

## 2018-12-18 RX ADMIN — PROPOFOL 100 MCG/KG/MIN: 10 INJECTION, EMULSION INTRAVENOUS at 17:10

## 2018-12-18 RX ADMIN — MORPHINE SULFATE 2 MG: 2 INJECTION, SOLUTION INTRAMUSCULAR; INTRAVENOUS at 02:19

## 2018-12-18 RX ADMIN — METRONIDAZOLE 500 MG: 500 TABLET ORAL at 06:22

## 2018-12-18 RX ADMIN — PHENYLEPHRINE HYDROCHLORIDE 100 MCG: 10 INJECTION INTRAVENOUS at 17:28

## 2018-12-18 RX ADMIN — MORPHINE SULFATE 2 MG: 2 INJECTION, SOLUTION INTRAMUSCULAR; INTRAVENOUS at 10:16

## 2018-12-18 RX ADMIN — SODIUM CHLORIDE: 9 INJECTION, SOLUTION INTRAVENOUS at 10:12

## 2018-12-18 RX ADMIN — SODIUM CHLORIDE: 9 INJECTION, SOLUTION INTRAVENOUS at 17:06

## 2018-12-18 RX ADMIN — DEXTROSE MONOHYDRATE 12.5 G: 25 INJECTION, SOLUTION INTRAVENOUS at 00:18

## 2018-12-18 RX ADMIN — Medication 10 ML: at 10:12

## 2018-12-18 RX ADMIN — MIDAZOLAM 2 MG: 1 INJECTION INTRAMUSCULAR; INTRAVENOUS at 17:07

## 2018-12-18 RX ADMIN — FENTANYL CITRATE 50 MCG: 50 INJECTION, SOLUTION INTRAMUSCULAR; INTRAVENOUS at 17:07

## 2018-12-18 RX ADMIN — CIPROFLOXACIN 400 MG: 2 INJECTION, SOLUTION INTRAVENOUS at 02:19

## 2018-12-18 RX ADMIN — CIPROFLOXACIN 400 MG: 2 INJECTION, SOLUTION INTRAVENOUS at 14:29

## 2018-12-18 RX ADMIN — OXYCODONE AND ACETAMINOPHEN 2 TABLET: 5; 325 TABLET ORAL at 20:50

## 2018-12-18 RX ADMIN — MORPHINE SULFATE 2 MG: 2 INJECTION, SOLUTION INTRAMUSCULAR; INTRAVENOUS at 14:29

## 2018-12-18 ASSESSMENT — PAIN DESCRIPTION - LOCATION
LOCATION: ABDOMEN
LOCATION: ABDOMEN

## 2018-12-18 ASSESSMENT — PAIN SCALES - GENERAL
PAINLEVEL_OUTOF10: 8
PAINLEVEL_OUTOF10: 8
PAINLEVEL_OUTOF10: 0
PAINLEVEL_OUTOF10: 3
PAINLEVEL_OUTOF10: 8
PAINLEVEL_OUTOF10: 8
PAINLEVEL_OUTOF10: 7

## 2018-12-18 ASSESSMENT — PAIN DESCRIPTION - ONSET
ONSET: ON-GOING
ONSET: ON-GOING

## 2018-12-18 ASSESSMENT — PAIN DESCRIPTION - DESCRIPTORS
DESCRIPTORS: ACHING;DISCOMFORT
DESCRIPTORS: ACHING;DISCOMFORT;CONSTANT;SORE;TENDER

## 2018-12-18 ASSESSMENT — PAIN DESCRIPTION - PAIN TYPE
TYPE: ACUTE PAIN
TYPE: ACUTE PAIN;CHRONIC PAIN

## 2018-12-18 ASSESSMENT — PAIN DESCRIPTION - PROGRESSION
CLINICAL_PROGRESSION: NOT CHANGED
CLINICAL_PROGRESSION: NOT CHANGED

## 2018-12-18 ASSESSMENT — PAIN DESCRIPTION - FREQUENCY
FREQUENCY: CONTINUOUS
FREQUENCY: CONTINUOUS

## 2018-12-18 NOTE — ANESTHESIA PRE PROCEDURE
DO Winifred   40 mg at 18 0946       Allergies:     Allergies   Allergen Reactions    Nicoderm [Nicotine] Rash    Toradol [Ketorolac Tromethamine] Anaphylaxis and Hives       Problem List:    Patient Active Problem List   Diagnosis Code    High-risk pregnancy O09.90    Previous stillbirth or demise, antepartum O09.299    Non compliance w medication regimen Z91.14    Tobacco smoking complicating pregnancy K45.112    Drug use complicating pregnancy in third trimester O99.323    MTHFR mutation ([address] E72.12    Poorly controlled type 1 diabetes mellitus ([address] E10.65    Diabetes mellitus type 1, uncontrolled ([address] E10.65    Previous  delivery affecting pregnancy, antepartum O34.219    Oligohydramnios O41.00X0    Kidney stones N20.0    Menses painful N94.6    Generalized abdominal pain R10.84    Opioid abuse, episodic ([address] F11.10    Tobacco abuse Z72.0    Diabetic ketoacidosis without coma associated with type 1 diabetes mellitus ([address] E10.10    Sepsis ([address] A41.9    Hypoglycemia E16.2    Essential hypertension I10    DKA, type 1, not at goal ([address] E10.10    Hyponatremia E87.1    Type 1 diabetes mellitus with hyperosmolarity without coma (HCC) E10.69    First trimester pregnancy Z34.91    Acute electrocardiogram changes R94.31    Acute renal failure (HCC) N17.9    Diabetic gastroparesis associated with type 1 diabetes mellitus (HCC) E10.43, K31.84    Diarrhea in adult patient R19.7    Generalized abdominal pain R10.84       Past Medical History:        Diagnosis Date    Chronic kidney disease     Depression     Diabetes mellitus ([address]     Diabetic ketoacidosis ([address] 2011    MDRO (multiple drug resistant organisms) resistance     MRSA (methicillin resistant Staphylococcus aureus)     back wound abcess    Non compliance w medication regimen 3/30/2016    Other disorders of kidney and ureter     Pregnancy 3/30/2016    16 weeks    Severe pre-eclampsia in third 12/16/2018    ALKPHOS 88 12/16/2018    AST 10 12/16/2018    ALT 8 12/16/2018       POC Tests: No results for input(s): POCGLU, POCNA, POCK, POCCL, POCBUN, POCHEMO, POCHCT in the last 72 hours. Coags:   Lab Results   Component Value Date    PROTIME 10.4 04/16/2018    PROTIME 13.6 08/14/2011    INR 0.9 04/16/2018    APTT 27.1 02/09/2015       HCG (If Applicable):   Lab Results   Component Value Date    PREGTESTUR Neg 12/14/2018    HCG 50997.4 (H) 06/26/2013        ABGs:   Lab Results   Component Value Date    PHART 7.345 07/20/2012    W2TTPWSW 97.7 09/08/2012        Type & Screen (If Applicable):  Lab Results   Component Value Date    LABABO O 12/29/2011    [address] POSITIVE 12/29/2011       Anesthesia Evaluation  Patient summary reviewed and Nursing notes reviewed no history of anesthetic complications:   Airway: Mallampati: II  TM distance: >3 FB     Mouth opening: > = 3 FB Dental: normal exam         Pulmonary:Negative Pulmonary ROS breath sounds clear to auscultation                             Cardiovascular:    (+) hypertension:,       ECG reviewed  Rhythm: regular  Rate: normal  Echocardiogram reviewed               ROS comment: EKG: Normal Sinus Rhythm 92. ECHO:  Ejection fraction is visually estimated at 55%.   No regional wall motion abnormalities seen.   Normal left ventricular diastolic filling pattern for age.  Geraldyne Pilar right ventricle structure and function.   Mild mitral regurgitation is present.   Physiologic and/or trace tricuspid regurgitation. Neuro/Psych:   Negative Neuro/Psych ROS  (+) psychiatric history:depression/anxiety             GI/Hepatic/Renal:            ROS comment: Abd pain. Endo/Other:    (+) DiabetesType II DM, using insulin, . ROS comment: Port due to poor IV access Abdominal:         (-) obese     Vascular: negative vascular ROS. Anesthesia Plan      MAC     ASA 3       Induction: intravenous.       Anesthetic plan and

## 2018-12-18 NOTE — PROCEDURES
Michelle Khalil is a 34 y.o. female patient. 1. Hyperglycemia    2. Intractable vomiting with nausea, unspecified vomiting type    3. Dehydration      Past Medical History:   Diagnosis Date    Chronic kidney disease     Depression     [facility]     Diabetic ketoacidosis ([facility] 8/27/2011    MDRO (multiple drug resistant organisms) resistance     MRSA (methicillin resistant Staphylococcus aureus)     back wound abcess    Non compliance w medication regimen 3/30/2016    Other disorders of kidney and ureter     Pregnancy 3/30/2016    16 weeks    Severe pre-eclampsia in third trimester 8/22/2016     Blood pressure 125/79, pulse 112, temperature 98.1 °F (36.7 °C), resp. rate 16, height 5' 4\" (1.626 m), weight 165 lb 6.4 oz (75 kg), last menstrual period 10/16/2018, SpO2 100 %, currently breastfeeding. Procedures  Colonoscopy note    Indication: Terminal ileum thickening, abnormal CT, chronic diarrhea    Sedation:  MAC      Endoscope was advanced through anus to cecum and terminal ileum, ileocecal valve and appendiceal orifice were identified and pictures were taken. Preparation is fair. Patient tolerated procedure well. Terminal ileum is normal. Biopsies taken for inflammatory bowel disease. Cecum is normal. Biopsies taken for IBD and microscopic colitis. Ascending colon is normal  Transverse colon is normal. Biopsies taken for IBD and microscopic colitis. Descending colon is normal  Sigmoid colon normal.  Rectum direct views are normal. Biopsies taken for IBD and microscopic colitis. IMPRESSION AND PLAN:   1. Normal colonoscopy. 2. Terminal ileum biopsied for IBD. 3. Random colonic biopsies obtained for IBD and microscopic colitis. 4.  Follow up as outpatient in office, call [phone] to schedule for appointment if needed. Pt was seen and procedure was performed with Dr. Ailyn Kelley present for the entire procedure.     Rachael Mckee DO  12/18/2018      I was

## 2018-12-18 NOTE — PROGRESS NOTES
[facility] Quality Flow/Interdisciplinary Rounds Progress Note        Quality Flow Rounds held on December 18, 2018    Disciplines Attending:  Bedside Nurse, ,  and Nursing Unit Omar Patricia was admitted on 12/14/2018 10:08 AM    Anticipated Discharge Date:  Expected Discharge Date: 12/17/18    Disposition:    Benedicto Score:  Benedicto Scale Score: 22    Readmission Risk              Risk of Unplanned Readmission:        51           Discussed patient goal for the day, patient clinical progression, and barriers to discharge.   The following Goal(s) of the Day/Commitment(s) have been identified:  EGD and colonoscopy planned for today; monitor blood sugar        [facility]  December 18, 2018

## 2018-12-19 VITALS
OXYGEN SATURATION: 99 % | BODY MASS INDEX: 28.24 KG/M2 | DIASTOLIC BLOOD PRESSURE: 83 MMHG | WEIGHT: 165.4 LBS | HEIGHT: 64 IN | RESPIRATION RATE: 16 BRPM | HEART RATE: 101 BPM | SYSTOLIC BLOOD PRESSURE: 151 MMHG | TEMPERATURE: 98.7 F

## 2018-12-19 LAB
ANION GAP SERPL CALCULATED.3IONS-SCNC: 5 MMOL/L (ref 7–16)
BASOPHILS ABSOLUTE: 0.07 E9/L (ref 0–0.2)
BASOPHILS RELATIVE PERCENT: 1.1 % (ref 0–2)
BUN BLDV-MCNC: 8 MG/DL (ref 6–20)
CALCIUM SERPL-MCNC: 7.7 MG/DL (ref 8.6–10.2)
CHLORIDE BLD-SCNC: 107 MMOL/L (ref 98–107)
CO2: 21 MMOL/L (ref 22–29)
CREAT SERPL-MCNC: 1 MG/DL (ref 0.5–1)
CULTURE, STOOL: NORMAL
EOSINOPHILS ABSOLUTE: 0.23 E9/L (ref 0.05–0.5)
EOSINOPHILS RELATIVE PERCENT: 3.7 % (ref 0–6)
GFR AFRICAN AMERICAN: >60
GFR NON-AFRICAN AMERICAN: >60 ML/MIN/1.73
GLIADIN ANTIBODIES IGA: 3 UNITS (ref 0–19)
GLIADIN ANTIBODIES IGG: 2 UNITS (ref 0–19)
GLUCOSE BLD-MCNC: 194 MG/DL (ref 74–99)
HCT VFR BLD CALC: 23.8 % (ref 34–48)
HEMOGLOBIN: 7.8 G/DL (ref 11.5–15.5)
IGA: 253 MG/DL (ref 70–400)
IMMATURE GRANULOCYTES #: 0.02 E9/L
IMMATURE GRANULOCYTES %: 0.3 % (ref 0–5)
LYMPHOCYTES ABSOLUTE: 2.31 E9/L (ref 1.5–4)
LYMPHOCYTES RELATIVE PERCENT: 37 % (ref 20–42)
MCH RBC QN AUTO: 29.4 PG (ref 26–35)
MCHC RBC AUTO-ENTMCNC: 32.8 % (ref 32–34.5)
MCV RBC AUTO: 89.8 FL (ref 80–99.9)
METER GLUCOSE: 191 MG/DL (ref 74–99)
METER GLUCOSE: 207 MG/DL (ref 74–99)
METER GLUCOSE: 213 MG/DL (ref 74–99)
METER GLUCOSE: 364 MG/DL (ref 74–99)
METER GLUCOSE: 391 MG/DL (ref 74–99)
METER GLUCOSE: 391 MG/DL (ref 74–99)
METER GLUCOSE: 43 MG/DL (ref 74–99)
METER GLUCOSE: 483 MG/DL (ref 74–99)
METER GLUCOSE: 53 MG/DL (ref 74–99)
METER GLUCOSE: 54 MG/DL (ref 74–99)
METER GLUCOSE: <40 MG/DL (ref 74–99)
MONOCYTES ABSOLUTE: 0.28 E9/L (ref 0.1–0.95)
MONOCYTES RELATIVE PERCENT: 4.5 % (ref 2–12)
NEUTROPHILS ABSOLUTE: 3.34 E9/L (ref 1.8–7.3)
NEUTROPHILS RELATIVE PERCENT: 53.4 % (ref 43–80)
PDW BLD-RTO: 13.4 FL (ref 11.5–15)
PLATELET # BLD: 222 E9/L (ref 130–450)
PMV BLD AUTO: 10.5 FL (ref 7–12)
POTASSIUM SERPL-SCNC: 3.7 MMOL/L (ref 3.5–5)
RBC # BLD: 2.65 E12/L (ref 3.5–5.5)
SODIUM BLD-SCNC: 133 MMOL/L (ref 132–146)
TISSUE TRANSGLUTAMINASE IGA: 1 U/ML (ref 0–3)
WBC # BLD: 6.3 E9/L (ref 4.5–11.5)

## 2018-12-19 PROCEDURE — 6360000002 HC RX W HCPCS: Performed by: INTERNAL MEDICINE

## 2018-12-19 PROCEDURE — 99239 HOSP IP/OBS DSCHRG MGMT >30: CPT | Performed by: INTERNAL MEDICINE

## 2018-12-19 PROCEDURE — 2580000003 HC RX 258: Performed by: HOSPITALIST

## 2018-12-19 PROCEDURE — 6370000000 HC RX 637 (ALT 250 FOR IP): Performed by: HOSPITALIST

## 2018-12-19 PROCEDURE — 85025 COMPLETE CBC W/AUTO DIFF WBC: CPT

## 2018-12-19 PROCEDURE — 2580000003 HC RX 258: Performed by: NURSE PRACTITIONER

## 2018-12-19 PROCEDURE — 6370000000 HC RX 637 (ALT 250 FOR IP): Performed by: NURSE PRACTITIONER

## 2018-12-19 PROCEDURE — 6360000002 HC RX W HCPCS: Performed by: HOSPITALIST

## 2018-12-19 PROCEDURE — 6370000000 HC RX 637 (ALT 250 FOR IP): Performed by: INTERNAL MEDICINE

## 2018-12-19 PROCEDURE — 6370000000 HC RX 637 (ALT 250 FOR IP): Performed by: STUDENT IN AN ORGANIZED HEALTH CARE EDUCATION/TRAINING PROGRAM

## 2018-12-19 PROCEDURE — 36415 COLL VENOUS BLD VENIPUNCTURE: CPT

## 2018-12-19 PROCEDURE — 82962 GLUCOSE BLOOD TEST: CPT

## 2018-12-19 PROCEDURE — 80048 BASIC METABOLIC PNL TOTAL CA: CPT

## 2018-12-19 RX ORDER — OXYCODONE HYDROCHLORIDE AND ACETAMINOPHEN 5; 325 MG/1; MG/1
1 TABLET ORAL EVERY 6 HOURS PRN
Qty: 12 TABLET | Refills: 0 | Status: SHIPPED | OUTPATIENT
Start: 2018-12-19 | End: 2018-12-22

## 2018-12-19 RX ORDER — CALCIUM POLYCARBOPHIL 625 MG 625 MG/1
625 TABLET ORAL DAILY
Status: DISCONTINUED | OUTPATIENT
Start: 2018-12-19 | End: 2018-12-19 | Stop reason: HOSPADM

## 2018-12-19 RX ORDER — PANTOPRAZOLE SODIUM 40 MG/1
40 TABLET, DELAYED RELEASE ORAL
Qty: 30 TABLET | Refills: 0 | Status: SHIPPED | OUTPATIENT
Start: 2018-12-20 | End: 2019-05-13

## 2018-12-19 RX ORDER — CALCIUM POLYCARBOPHIL 625 MG 625 MG/1
625 TABLET ORAL DAILY
Refills: 0 | COMMUNITY
Start: 2018-12-20 | End: 2019-05-13

## 2018-12-19 RX ORDER — LOPERAMIDE HYDROCHLORIDE 2 MG/1
2 CAPSULE ORAL 4 TIMES DAILY PRN
Status: DISCONTINUED | OUTPATIENT
Start: 2018-12-19 | End: 2018-12-19 | Stop reason: HOSPADM

## 2018-12-19 RX ADMIN — DEXTROSE MONOHYDRATE 12.5 G: 25 INJECTION, SOLUTION INTRAVENOUS at 09:59

## 2018-12-19 RX ADMIN — PANTOPRAZOLE SODIUM 40 MG: 40 TABLET, DELAYED RELEASE ORAL at 06:15

## 2018-12-19 RX ADMIN — INSULIN LISPRO 8 UNITS: 100 INJECTION, SOLUTION INTRAVENOUS; SUBCUTANEOUS at 16:42

## 2018-12-19 RX ADMIN — INSULIN LISPRO 8 UNITS: 100 INJECTION, SOLUTION INTRAVENOUS; SUBCUTANEOUS at 07:59

## 2018-12-19 RX ADMIN — OXYCODONE AND ACETAMINOPHEN 2 TABLET: 5; 325 TABLET ORAL at 15:53

## 2018-12-19 RX ADMIN — HEPARIN 100 UNITS: 100 SYRINGE at 20:15

## 2018-12-19 RX ADMIN — CALCIUM POLYCARBOPHIL 625 MG TABLET 625 MG: at 11:03

## 2018-12-19 RX ADMIN — OXYCODONE AND ACETAMINOPHEN 2 TABLET: 5; 325 TABLET ORAL at 01:11

## 2018-12-19 RX ADMIN — ENOXAPARIN SODIUM 40 MG: 40 INJECTION SUBCUTANEOUS at 09:45

## 2018-12-19 RX ADMIN — OXYCODONE AND ACETAMINOPHEN 2 TABLET: 5; 325 TABLET ORAL at 05:31

## 2018-12-19 RX ADMIN — INSULIN LISPRO 7 UNITS: 100 INJECTION, SOLUTION INTRAVENOUS; SUBCUTANEOUS at 02:29

## 2018-12-19 RX ADMIN — INSULIN LISPRO 10 UNITS: 100 INJECTION, SOLUTION INTRAVENOUS; SUBCUTANEOUS at 18:01

## 2018-12-19 RX ADMIN — OXYCODONE AND ACETAMINOPHEN 2 TABLET: 5; 325 TABLET ORAL at 09:45

## 2018-12-19 RX ADMIN — INSULIN LISPRO 6 UNITS: 100 INJECTION, SOLUTION INTRAVENOUS; SUBCUTANEOUS at 07:57

## 2018-12-19 RX ADMIN — SODIUM CHLORIDE: 9 INJECTION, SOLUTION INTRAVENOUS at 01:11

## 2018-12-19 ASSESSMENT — PAIN DESCRIPTION - LOCATION: LOCATION: ABDOMEN

## 2018-12-19 ASSESSMENT — PAIN SCALES - GENERAL
PAINLEVEL_OUTOF10: 6
PAINLEVEL_OUTOF10: 8
PAINLEVEL_OUTOF10: 8
PAINLEVEL_OUTOF10: 6
PAINLEVEL_OUTOF10: 7
PAINLEVEL_OUTOF10: 8
PAINLEVEL_OUTOF10: 5

## 2018-12-19 ASSESSMENT — PAIN DESCRIPTION - PROGRESSION
CLINICAL_PROGRESSION: NOT CHANGED
CLINICAL_PROGRESSION: NOT CHANGED

## 2018-12-19 ASSESSMENT — PAIN DESCRIPTION - DESCRIPTORS: DESCRIPTORS: ACHING;DISCOMFORT;CRAMPING

## 2018-12-19 ASSESSMENT — PAIN DESCRIPTION - FREQUENCY: FREQUENCY: CONTINUOUS

## 2018-12-19 ASSESSMENT — PAIN DESCRIPTION - PAIN TYPE: TYPE: ACUTE PAIN

## 2018-12-19 ASSESSMENT — PAIN DESCRIPTION - ONSET: ONSET: ON-GOING

## 2018-12-19 NOTE — PROGRESS NOTES
cyanosis, no clubbing and no edema  Neurologic: no cranial nerve deficit and speech normal        Recent Labs      12/16/18   0425  12/17/18   0414  12/18/18   0440   NA  133  136  134   K  4.3  4.1  4.2   CL  105  107  106   CO2  21*  22  22   BUN  14  16  13   CREATININE  1.1*  1.0  0.8   GLUCOSE  256*  234*  202*   CALCIUM  8.0*  7.7*  7.8*       Recent Labs      12/16/18   0425  12/17/18   0414  12/18/18   0440   WBC  6.3  4.7  6.6   RBC  2.91*  2.70*  2.99*   HGB  8.5*  7.9*  8.7*   HCT  25.5*  24.1*  26.6*   MCV  87.6  89.3  89.0   MCH  29.2  29.3  29.1   MCHC  33.3  32.8  32.7   RDW  12.7  13.2  13.2   PLT  257  216  255   MPV  10.5  10.3  10.9       Hemoglobin A1c [[id number]] (Abnormal) Collected: 12/14/18 2000     Specimen: Blood Updated: 12/15/18 1218      Hemoglobin A1C 12.9 (H) %        Giardia antigen [[id number]] Collected: 12/17/18 0316   Updated: 12/17/18 1344    Specimen Type: Rectum    Specimen Source: Stool     Giardia Ag, Stl --    Giardia antigen test is negative   *   *   Reference range: negative   Giardia antigen testing is performed routinely in place of   Ova and Parasite Examination on stool specimens. Additional   testing requires consultation with the laboratory. All stool   specimen preservative containers are held 10 days after the   performance of Giardia Antigen to accommodate any additional   testing.     Narrative:     Source: STOOL       Site: Rectum&Rectum             Cryptosporidium Antigen, Stool [[id number]] Collected: 12/17/18 0316   Updated: 12/17/18 1343    Specimen Source: Stool     Cryptosporidium Ag --    Cryptosporidium antigen test is negative   *   *   Reference range: negative    Narrative:     Source: STOOL       Site:              Rotavirus Antigen, Stool [[id number]] Collected: 12/17/18 0316   Updated: 12/17/18 1330    Specimen Source: Stool     Rotavirus --    Rotavirus antigen test result: negative   *   *   Reference range: negative    Narrative:     Source: STOOL

## 2018-12-19 NOTE — ANESTHESIA POSTPROCEDURE EVALUATION
Department of Anesthesiology  Postprocedure Note    Patient: Hoda Kent  MRN: [id number]  YOB: 1990  Date of evaluation: 12/18/2018  Time:  8:38 PM     Procedure Summary     Date:  12/18/18 Room / Location:  [place] [address] / [facility] ENDOSCOPY    Anesthesia Start:  1706 Anesthesia Stop:  1185    Procedures:       COLONOSCOPY WITH BIOPSY (N/A )      EGD BIOPSY Diagnosis:  (ANEMIA AND ABDOMINAL PAIN)    Surgeon:  Ml Flores MD Responsible Provider:  Albino Brito MD    Anesthesia Type:  MAC ASA Status:  3          Anesthesia Type: MAC    Shilpa Phase I:      Shilpa Phase II: Shilap Score: 9    Last vitals: Reviewed and per EMR flowsheets.        Anesthesia Post Evaluation    Patient location during evaluation: PACU  Patient participation: complete - patient participated  Level of consciousness: awake and alert  Airway patency: patent  Nausea & Vomiting: no nausea and no vomiting  Complications: no  Cardiovascular status: hemodynamically stable  Respiratory status: acceptable  Hydration status: euvolemic

## 2018-12-20 NOTE — DISCHARGE SUMMARY
Rotavirus antigen test result: negative   *   *   Reference range: negative    Narrative:     Source: STOOL       Site:              OCCULT BLOOD DIAGNOSTIC [[id number]] Collected: 12/17/18 0316   Updated: 12/17/18 1301     Specimen Source: Stool       Occult Blood Diagnostic --     Occult Blood test result is negative. *   *   Normal range: negative    Narrative:     Source: STOOL       Site:                  Imaging:      Xr Abdomen (kub) (single Ap View)    Result Date: 12/14/2018  LOCATION: 200 EXAM: XR ABDOMEN (KUB) (SINGLE AP VIEW) COMPARISON: None HISTORY: Abdominal pain TECHNIQUE: Supine view  of the abdomen. FINDINGS: No focally dilated loops or free air is visualized. No free air seen on supine view. No abdominal calcifications present. Lung bases are clear. 1. No acute abdominal abnormality. Ct Abdomen Pelvis W Iv Contrast Additional Contrast? Oral    Result Date: 12/15/2018  Location:200 Exam: CT ABDOMEN PELVIS W IV CONTRAST Comparison: September 28, 2017 History:  Abdominal pain uncontrolled diarrhea Contrast: Isovue-370 80 mL IV. Oral contrast Omnipaque-240 50 cc p.o. FINDINGS:  Spiral CT scan of the abdomen and pelvis performed after IV and oral contrast from the lower chest to symphysis pubis. Initial exam was followed by delayed examination, after having the patient voided. Automatated dose exposure control was utilized for this examination. Lung bases: Clear Postop changes: None Large amount of stool throughout the entire colon. There is a filling defect within the a sending colon. The terminal ileum is poorly visualized, is questionable thickening of the terminal ileum. There appears to be some degree of opacification of a small appendix. No evidence of periappendiceal inflammatory change. The urinary bladder is massively distended. There is questionable bowel wall thickening involving the terminal ileum. Cannot identify the appendix. The gallbladder is contracted.  The liver, spleen,

## 2019-01-02 ENCOUNTER — APPOINTMENT (OUTPATIENT)
Dept: ULTRASOUND IMAGING | Age: 29
End: 2019-01-02
Payer: COMMERCIAL

## 2019-01-02 ENCOUNTER — HOSPITAL ENCOUNTER (EMERGENCY)
Age: 29
Discharge: HOME | End: 2019-01-02
Attending: EMERGENCY MEDICINE
Payer: COMMERCIAL

## 2019-01-02 VITALS
HEIGHT: 64 IN | RESPIRATION RATE: 18 BRPM | BODY MASS INDEX: 25.61 KG/M2 | WEIGHT: 150 LBS | DIASTOLIC BLOOD PRESSURE: 86 MMHG | SYSTOLIC BLOOD PRESSURE: 140 MMHG | OXYGEN SATURATION: 100 % | TEMPERATURE: 98.3 F | HEART RATE: 82 BPM

## 2019-01-02 DIAGNOSIS — R11.2 NON-INTRACTABLE VOMITING WITH NAUSEA, UNSPECIFIED VOMITING TYPE: Primary | ICD-10-CM

## 2019-01-02 DIAGNOSIS — R73.9 HYPERGLYCEMIA: ICD-10-CM

## 2019-01-02 DIAGNOSIS — O26.91 COMPLICATION OF PREGNANCY IN FIRST TRIMESTER: ICD-10-CM

## 2019-01-02 LAB
ALBUMIN SERPL-MCNC: 3.4 G/DL (ref 3.5–5.2)
ALP BLD-CCNC: 147 U/L (ref 35–104)
ALT SERPL-CCNC: 13 U/L (ref 0–32)
ANION GAP SERPL CALCULATED.3IONS-SCNC: 12 MMOL/L (ref 7–16)
AST SERPL-CCNC: 14 U/L (ref 0–31)
BACTERIA: ABNORMAL /HPF
BASOPHILS ABSOLUTE: 0.07 E9/L (ref 0–0.2)
BASOPHILS RELATIVE PERCENT: 0.8 % (ref 0–2)
BETA-HYDROXYBUTYRATE: 0.8 MMOL/L (ref 0.02–0.27)
BILIRUB SERPL-MCNC: <0.2 MG/DL (ref 0–1.2)
BILIRUBIN URINE: NEGATIVE
BLOOD, URINE: ABNORMAL
BUN BLDV-MCNC: 26 MG/DL (ref 6–20)
CALCIUM SERPL-MCNC: 8.5 MG/DL (ref 8.6–10.2)
CHLORIDE BLD-SCNC: 96 MMOL/L (ref 98–107)
CHP ED QC CHECK: YES
CLARITY: CLEAR
CO2: 19 MMOL/L (ref 22–29)
CO2: 20 MMOL/L (ref 22–29)
CO2: 20 MMOL/L (ref 22–29)
COLOR: ABNORMAL
CREAT SERPL-MCNC: 1.3 MG/DL (ref 0.5–1)
EOSINOPHILS ABSOLUTE: 0.16 E9/L (ref 0.05–0.5)
EOSINOPHILS RELATIVE PERCENT: 1.8 % (ref 0–6)
EPITHELIAL CELLS, UA: ABNORMAL /HPF
GFR AFRICAN AMERICAN: 60
GFR AFRICAN AMERICAN: 60
GFR AFRICAN AMERICAN: >60
GFR NON-AFRICAN AMERICAN: 49 ML/MIN/1.73
GFR NON-AFRICAN AMERICAN: 60 ML/MIN/1.73
GFR NON-AFRICAN AMERICAN: >60 ML/MIN/1.73
GLUCOSE BLD-MCNC: 124 MG/DL (ref 74–99)
GLUCOSE BLD-MCNC: 648 MG/DL (ref 74–99)
GLUCOSE BLD-MCNC: 686 MG/DL (ref 74–99)
GLUCOSE URINE: >=1000 MG/DL
GONADOTROPIN, CHORIONIC (HCG) QUANT: ABNORMAL MIU/ML
HCT VFR BLD CALC: 25.5 % (ref 34–48)
HEMOGLOBIN: 8.2 G/DL (ref 11.5–15.5)
IMMATURE GRANULOCYTES #: 0.04 E9/L
IMMATURE GRANULOCYTES %: 0.4 % (ref 0–5)
KETONES, URINE: 15 MG/DL
LEUKOCYTE ESTERASE, URINE: NEGATIVE
LIPASE: 36 U/L (ref 13–60)
LYMPHOCYTES ABSOLUTE: 1.58 E9/L (ref 1.5–4)
LYMPHOCYTES RELATIVE PERCENT: 17.7 % (ref 20–42)
MCH RBC QN AUTO: 29.5 PG (ref 26–35)
MCHC RBC AUTO-ENTMCNC: 32.2 % (ref 32–34.5)
MCV RBC AUTO: 91.7 FL (ref 80–99.9)
MONOCYTES ABSOLUTE: 0.4 E9/L (ref 0.1–0.95)
MONOCYTES RELATIVE PERCENT: 4.5 % (ref 2–12)
NEUTROPHILS ABSOLUTE: 6.66 E9/L (ref 1.8–7.3)
NEUTROPHILS RELATIVE PERCENT: 74.8 % (ref 43–80)
NITRITE, URINE: NEGATIVE
PDW BLD-RTO: 13.8 FL (ref 11.5–15)
PH UA: 6 (ref 5–9)
PH VENOUS: 7.31 (ref 7.3–7.42)
PLATELET # BLD: 203 E9/L (ref 130–450)
PMV BLD AUTO: 10.1 FL (ref 7–12)
POC ANION GAP: 13 MMOL/L (ref 7–16)
POC ANION GAP: 15 MMOL/L (ref 7–16)
POC BUN: 20 MG/DL (ref 8–23)
POC BUN: 25 MG/DL (ref 8–23)
POC CHLORIDE: 105 MMOL/L (ref 100–108)
POC CHLORIDE: 97 MMOL/L (ref 100–108)
POC CREATININE: 1 MG/DL (ref 0.5–1)
POC CREATININE: 1.3 MG/DL (ref 0.5–1)
POC POTASSIUM: 4.1 MMOL/L (ref 3.5–5)
POC POTASSIUM: 4.4 MMOL/L (ref 3.5–5)
POC SODIUM: 131 MMOL/L (ref 132–146)
POC SODIUM: 138 MMOL/L (ref 132–146)
POTASSIUM SERPL-SCNC: 4.6 MMOL/L (ref 3.5–5)
PREGNANCY TEST URINE, POC: POSITIVE
PROTEIN UA: 30 MG/DL
RBC # BLD: 2.78 E12/L (ref 3.5–5.5)
RBC UA: ABNORMAL /HPF (ref 0–2)
SODIUM BLD-SCNC: 128 MMOL/L (ref 132–146)
SPECIFIC GRAVITY UA: 1.01 (ref 1–1.03)
TOTAL PROTEIN: 6.2 G/DL (ref 6.4–8.3)
UROBILINOGEN, URINE: 0.2 E.U./DL
WBC # BLD: 8.9 E9/L (ref 4.5–11.5)
WBC UA: ABNORMAL /HPF (ref 0–5)

## 2019-01-02 PROCEDURE — 82435 ASSAY OF BLOOD CHLORIDE: CPT

## 2019-01-02 PROCEDURE — 84520 ASSAY OF UREA NITROGEN: CPT

## 2019-01-02 PROCEDURE — 36415 COLL VENOUS BLD VENIPUNCTURE: CPT

## 2019-01-02 PROCEDURE — 82947 ASSAY GLUCOSE BLOOD QUANT: CPT

## 2019-01-02 PROCEDURE — 84132 ASSAY OF SERUM POTASSIUM: CPT

## 2019-01-02 PROCEDURE — 6370000000 HC RX 637 (ALT 250 FOR IP): Performed by: EMERGENCY MEDICINE

## 2019-01-02 PROCEDURE — 82565 ASSAY OF CREATININE: CPT

## 2019-01-02 PROCEDURE — 84702 CHORIONIC GONADOTROPIN TEST: CPT

## 2019-01-02 PROCEDURE — 82010 KETONE BODYS QUAN: CPT

## 2019-01-02 PROCEDURE — 85025 COMPLETE CBC W/AUTO DIFF WBC: CPT

## 2019-01-02 PROCEDURE — 76801 OB US < 14 WKS SINGLE FETUS: CPT

## 2019-01-02 PROCEDURE — 82374 ASSAY BLOOD CARBON DIOXIDE: CPT

## 2019-01-02 PROCEDURE — 85014 HEMATOCRIT: CPT

## 2019-01-02 PROCEDURE — 96374 THER/PROPH/DIAG INJ IV PUSH: CPT

## 2019-01-02 PROCEDURE — 6360000002 HC RX W HCPCS: Performed by: EMERGENCY MEDICINE

## 2019-01-02 PROCEDURE — 2580000003 HC RX 258: Performed by: EMERGENCY MEDICINE

## 2019-01-02 PROCEDURE — 80053 COMPREHEN METABOLIC PANEL: CPT

## 2019-01-02 PROCEDURE — 99284 EMERGENCY DEPT VISIT MOD MDM: CPT

## 2019-01-02 PROCEDURE — 80051 ELECTROLYTE PANEL: CPT

## 2019-01-02 PROCEDURE — 84295 ASSAY OF SERUM SODIUM: CPT

## 2019-01-02 PROCEDURE — 82800 BLOOD PH: CPT

## 2019-01-02 PROCEDURE — 83690 ASSAY OF LIPASE: CPT

## 2019-01-02 PROCEDURE — 96375 TX/PRO/DX INJ NEW DRUG ADDON: CPT

## 2019-01-02 PROCEDURE — 81001 URINALYSIS AUTO W/SCOPE: CPT

## 2019-01-02 RX ORDER — MORPHINE SULFATE 10 MG/ML
5 INJECTION, SOLUTION INTRAMUSCULAR; INTRAVENOUS ONCE
Status: DISCONTINUED | OUTPATIENT
Start: 2019-01-02 | End: 2019-01-02

## 2019-01-02 RX ORDER — ONDANSETRON 2 MG/ML
4 INJECTION INTRAMUSCULAR; INTRAVENOUS ONCE
Status: COMPLETED | OUTPATIENT
Start: 2019-01-02 | End: 2019-01-02

## 2019-01-02 RX ORDER — SODIUM CHLORIDE 9 MG/ML
1000 INJECTION, SOLUTION INTRAVENOUS ONCE
Status: COMPLETED | OUTPATIENT
Start: 2019-01-02 | End: 2019-01-02

## 2019-01-02 RX ORDER — HEPARIN SODIUM (PORCINE) LOCK FLUSH IV SOLN 100 UNIT/ML 100 UNIT/ML
SOLUTION INTRAVENOUS
Status: DISCONTINUED
Start: 2019-01-02 | End: 2019-01-02 | Stop reason: HOSPADM

## 2019-01-02 RX ADMIN — ONDANSETRON 4 MG: 2 INJECTION INTRAMUSCULAR; INTRAVENOUS at 04:44

## 2019-01-02 RX ADMIN — SODIUM CHLORIDE 1000 ML: 900 INJECTION, SOLUTION INTRAVENOUS at 07:34

## 2019-01-02 RX ADMIN — INSULIN HUMAN 12 UNITS: 100 INJECTION, SOLUTION PARENTERAL at 06:32

## 2019-01-02 RX ADMIN — SODIUM CHLORIDE 1000 ML: 9 INJECTION, SOLUTION INTRAVENOUS at 04:43

## 2019-01-02 RX ADMIN — SODIUM CHLORIDE 1000 ML: 900 INJECTION, SOLUTION INTRAVENOUS at 06:07

## 2019-01-02 ASSESSMENT — ENCOUNTER SYMPTOMS
DIARRHEA: 1
EYE DISCHARGE: 0
SORE THROAT: 0
EYE PAIN: 0
EYE REDNESS: 0
SHORTNESS OF BREATH: 0
COUGH: 0
NAUSEA: 1
VOMITING: 1
ABDOMINAL DISTENTION: 0
SINUS PRESSURE: 0
BACK PAIN: 0
ABDOMINAL PAIN: 1
WHEEZING: 0

## 2019-01-02 ASSESSMENT — PAIN DESCRIPTION - PAIN TYPE: TYPE: ACUTE PAIN;CHRONIC PAIN

## 2019-01-02 ASSESSMENT — PAIN DESCRIPTION - LOCATION: LOCATION: ABDOMEN

## 2019-01-02 ASSESSMENT — PAIN SCALES - GENERAL: PAINLEVEL_OUTOF10: 10

## 2019-02-22 ENCOUNTER — HOSPITAL ENCOUNTER (EMERGENCY)
Age: 29
Discharge: HOME | End: 2019-02-22
Attending: EMERGENCY MEDICINE
Payer: COMMERCIAL

## 2019-02-22 ENCOUNTER — APPOINTMENT (OUTPATIENT)
Dept: CT IMAGING | Age: 29
End: 2019-02-22
Payer: COMMERCIAL

## 2019-02-22 ENCOUNTER — APPOINTMENT (OUTPATIENT)
Dept: GENERAL RADIOLOGY | Age: 29
End: 2019-02-22
Payer: COMMERCIAL

## 2019-02-22 VITALS
BODY MASS INDEX: 25.61 KG/M2 | OXYGEN SATURATION: 100 % | SYSTOLIC BLOOD PRESSURE: 131 MMHG | TEMPERATURE: 96 F | RESPIRATION RATE: 14 BRPM | WEIGHT: 150 LBS | HEART RATE: 92 BPM | HEIGHT: 64 IN | DIASTOLIC BLOOD PRESSURE: 92 MMHG

## 2019-02-22 DIAGNOSIS — R91.1 LUNG NODULE: ICD-10-CM

## 2019-02-22 DIAGNOSIS — E16.2 HYPOGLYCEMIA: Primary | ICD-10-CM

## 2019-02-22 LAB
ANION GAP SERPL CALCULATED.3IONS-SCNC: 10 MMOL/L (ref 7–16)
BASOPHILS ABSOLUTE: 0.08 E9/L (ref 0–0.2)
BASOPHILS RELATIVE PERCENT: 1.2 % (ref 0–2)
BUN BLDV-MCNC: 14 MG/DL (ref 6–20)
CALCIUM SERPL-MCNC: 8.1 MG/DL (ref 8.6–10.2)
CHLORIDE BLD-SCNC: 110 MMOL/L (ref 98–107)
CHP ED QC CHECK: NORMAL
CO2: 19 MMOL/L (ref 22–29)
CREAT SERPL-MCNC: 0.8 MG/DL (ref 0.5–1)
EOSINOPHILS ABSOLUTE: 0.23 E9/L (ref 0.05–0.5)
EOSINOPHILS RELATIVE PERCENT: 3.6 % (ref 0–6)
GFR AFRICAN AMERICAN: >60
GFR NON-AFRICAN AMERICAN: >60 ML/MIN/1.73
GLUCOSE BLD-MCNC: 242 MG/DL (ref 74–99)
GLUCOSE BLD-MCNC: 257 MG/DL
GLUCOSE BLD-MCNC: 92 MG/DL
GONADOTROPIN, CHORIONIC (HCG) QUANT: 187.7 MIU/ML
HCT VFR BLD CALC: 26.5 % (ref 34–48)
HEMOGLOBIN: 8.6 G/DL (ref 11.5–15.5)
IMMATURE GRANULOCYTES #: 0.03 E9/L
IMMATURE GRANULOCYTES %: 0.5 % (ref 0–5)
INFLUENZA A BY PCR: NOT DETECTED
INFLUENZA B BY PCR: NOT DETECTED
LYMPHOCYTES ABSOLUTE: 1.62 E9/L (ref 1.5–4)
LYMPHOCYTES RELATIVE PERCENT: 25.2 % (ref 20–42)
MCH RBC QN AUTO: 30.9 PG (ref 26–35)
MCHC RBC AUTO-ENTMCNC: 32.5 % (ref 32–34.5)
MCV RBC AUTO: 95.3 FL (ref 80–99.9)
METER GLUCOSE: 102 MG/DL (ref 74–99)
METER GLUCOSE: 126 MG/DL (ref 74–99)
METER GLUCOSE: 257 MG/DL (ref 74–99)
METER GLUCOSE: 74 MG/DL (ref 74–99)
METER GLUCOSE: 92 MG/DL (ref 74–99)
MONOCYTES ABSOLUTE: 0.37 E9/L (ref 0.1–0.95)
MONOCYTES RELATIVE PERCENT: 5.8 % (ref 2–12)
NEUTROPHILS ABSOLUTE: 4.1 E9/L (ref 1.8–7.3)
NEUTROPHILS RELATIVE PERCENT: 63.7 % (ref 43–80)
PDW BLD-RTO: 13.4 FL (ref 11.5–15)
PLATELET # BLD: 237 E9/L (ref 130–450)
PMV BLD AUTO: 10 FL (ref 7–12)
POTASSIUM SERPL-SCNC: 3.4 MMOL/L (ref 3.5–5)
RBC # BLD: 2.78 E12/L (ref 3.5–5.5)
SODIUM BLD-SCNC: 139 MMOL/L (ref 132–146)
TOTAL CK: 31 U/L (ref 20–180)
WBC # BLD: 6.4 E9/L (ref 4.5–11.5)

## 2019-02-22 PROCEDURE — 99285 EMERGENCY DEPT VISIT HI MDM: CPT

## 2019-02-22 PROCEDURE — 82550 ASSAY OF CK (CPK): CPT

## 2019-02-22 PROCEDURE — 36415 COLL VENOUS BLD VENIPUNCTURE: CPT

## 2019-02-22 PROCEDURE — 6360000004 HC RX CONTRAST MEDICATION: Performed by: RADIOLOGY

## 2019-02-22 PROCEDURE — 85025 COMPLETE CBC W/AUTO DIFF WBC: CPT

## 2019-02-22 PROCEDURE — 82962 GLUCOSE BLOOD TEST: CPT

## 2019-02-22 PROCEDURE — 84702 CHORIONIC GONADOTROPIN TEST: CPT

## 2019-02-22 PROCEDURE — 96374 THER/PROPH/DIAG INJ IV PUSH: CPT

## 2019-02-22 PROCEDURE — 6360000002 HC RX W HCPCS: Performed by: EMERGENCY MEDICINE

## 2019-02-22 PROCEDURE — 71045 X-RAY EXAM CHEST 1 VIEW: CPT

## 2019-02-22 PROCEDURE — 2580000003 HC RX 258: Performed by: EMERGENCY MEDICINE

## 2019-02-22 PROCEDURE — 6370000000 HC RX 637 (ALT 250 FOR IP): Performed by: EMERGENCY MEDICINE

## 2019-02-22 PROCEDURE — 87502 INFLUENZA DNA AMP PROBE: CPT

## 2019-02-22 PROCEDURE — 72129 CT CHEST SPINE W/DYE: CPT

## 2019-02-22 PROCEDURE — 96375 TX/PRO/DX INJ NEW DRUG ADDON: CPT

## 2019-02-22 PROCEDURE — 6360000002 HC RX W HCPCS

## 2019-02-22 PROCEDURE — 80048 BASIC METABOLIC PNL TOTAL CA: CPT

## 2019-02-22 RX ORDER — 0.9 % SODIUM CHLORIDE 0.9 %
1000 INTRAVENOUS SOLUTION INTRAVENOUS ONCE
Status: COMPLETED | OUTPATIENT
Start: 2019-02-22 | End: 2019-02-22

## 2019-02-22 RX ORDER — DIPHENHYDRAMINE HYDROCHLORIDE 50 MG/ML
25 INJECTION INTRAMUSCULAR; INTRAVENOUS ONCE
Status: COMPLETED | OUTPATIENT
Start: 2019-02-22 | End: 2019-02-22

## 2019-02-22 RX ORDER — SODIUM CHLORIDE 0.9 % (FLUSH) 0.9 %
SYRINGE (ML) INJECTION
Status: DISCONTINUED
Start: 2019-02-22 | End: 2019-02-22 | Stop reason: HOSPADM

## 2019-02-22 RX ORDER — HEPARIN SODIUM (PORCINE) LOCK FLUSH IV SOLN 100 UNIT/ML 100 UNIT/ML
SOLUTION INTRAVENOUS
Status: COMPLETED
Start: 2019-02-22 | End: 2019-02-22

## 2019-02-22 RX ORDER — ACETAMINOPHEN 500 MG
1000 TABLET ORAL ONCE
Status: COMPLETED | OUTPATIENT
Start: 2019-02-22 | End: 2019-02-22

## 2019-02-22 RX ORDER — METOCLOPRAMIDE HYDROCHLORIDE 5 MG/ML
10 INJECTION INTRAMUSCULAR; INTRAVENOUS ONCE
Status: COMPLETED | OUTPATIENT
Start: 2019-02-22 | End: 2019-02-22

## 2019-02-22 RX ADMIN — IOPAMIDOL 80 ML: 755 INJECTION, SOLUTION INTRAVENOUS at 19:33

## 2019-02-22 RX ADMIN — ACETAMINOPHEN 1000 MG: 500 TABLET, FILM COATED ORAL at 15:23

## 2019-02-22 RX ADMIN — Medication: at 21:10

## 2019-02-22 RX ADMIN — METOCLOPRAMIDE 10 MG: 5 INJECTION, SOLUTION INTRAMUSCULAR; INTRAVENOUS at 20:02

## 2019-02-22 RX ADMIN — SODIUM CHLORIDE 1000 ML: 9 INJECTION, SOLUTION INTRAVENOUS at 20:02

## 2019-02-22 RX ADMIN — DIPHENHYDRAMINE HYDROCHLORIDE 25 MG: 50 INJECTION INTRAMUSCULAR; INTRAVENOUS at 20:02

## 2019-02-22 ASSESSMENT — ENCOUNTER SYMPTOMS
SHORTNESS OF BREATH: 0
NAUSEA: 0
SORE THROAT: 0
ABDOMINAL PAIN: 0
BACK PAIN: 0
COUGH: 0

## 2019-02-22 ASSESSMENT — PAIN SCALES - GENERAL: PAINLEVEL_OUTOF10: 8

## 2019-05-13 ENCOUNTER — HOSPITAL ENCOUNTER (EMERGENCY)
Age: 29
Discharge: HOME | End: 2019-05-13
Attending: EMERGENCY MEDICINE
Payer: COMMERCIAL

## 2019-05-13 VITALS
HEART RATE: 103 BPM | RESPIRATION RATE: 16 BRPM | HEIGHT: 63 IN | DIASTOLIC BLOOD PRESSURE: 75 MMHG | SYSTOLIC BLOOD PRESSURE: 114 MMHG | OXYGEN SATURATION: 98 % | TEMPERATURE: 98.2 F | BODY MASS INDEX: 24.45 KG/M2 | WEIGHT: 138 LBS

## 2019-05-13 DIAGNOSIS — K02.9 PAIN DUE TO DENTAL CARIES: Primary | ICD-10-CM

## 2019-05-13 PROCEDURE — 99282 EMERGENCY DEPT VISIT SF MDM: CPT

## 2019-05-13 RX ORDER — TRAMADOL HYDROCHLORIDE 50 MG/1
50 TABLET ORAL EVERY 6 HOURS PRN
Qty: 12 TABLET | Refills: 0 | Status: SHIPPED | OUTPATIENT
Start: 2019-05-13 | End: 2019-05-16

## 2019-05-13 RX ORDER — AMOXICILLIN 500 MG/1
500 CAPSULE ORAL 3 TIMES DAILY
Qty: 30 CAPSULE | Refills: 0 | Status: SHIPPED | OUTPATIENT
Start: 2019-05-13 | End: 2019-05-23

## 2019-05-13 RX ORDER — HYDROXYZINE PAMOATE 50 MG/1
50 CAPSULE ORAL 3 TIMES DAILY PRN
COMMUNITY
End: 2019-08-26

## 2019-05-13 RX ORDER — CITALOPRAM 40 MG/1
40 TABLET ORAL DAILY
COMMUNITY
End: 2019-08-26

## 2019-05-13 ASSESSMENT — ENCOUNTER SYMPTOMS
EYE REDNESS: 0
SHORTNESS OF BREATH: 0
EYE PAIN: 0
VOMITING: 0
EYE DISCHARGE: 0
COUGH: 0
NAUSEA: 0
ABDOMINAL DISTENTION: 0
SORE THROAT: 0
BACK PAIN: 0
WHEEZING: 0
DIARRHEA: 0
SINUS PRESSURE: 0

## 2019-05-13 ASSESSMENT — PAIN DESCRIPTION - ORIENTATION: ORIENTATION: UPPER;LEFT

## 2019-05-13 ASSESSMENT — PAIN DESCRIPTION - PAIN TYPE: TYPE: ACUTE PAIN

## 2019-05-13 ASSESSMENT — PAIN SCALES - GENERAL: PAINLEVEL_OUTOF10: 10

## 2019-05-13 ASSESSMENT — PAIN DESCRIPTION - DESCRIPTORS: DESCRIPTORS: ACHING

## 2019-05-13 ASSESSMENT — PAIN DESCRIPTION - LOCATION: LOCATION: TEETH

## 2019-05-13 NOTE — ED PROVIDER NOTES
The history is provided by the patient. Dental Pain   Location:  Upper  Upper teeth location:  2/RU 2nd molar and 3/RU 1st molar  Quality:  Aching  Severity:  Moderate  Onset quality:  Gradual  Duration:  1 week  Chronicity:  Chronic  Context: dental caries    Associated symptoms: no fever and no headaches        Review of Systems   Constitutional: Negative for chills and fever. HENT: Negative for ear pain, sinus pressure and sore throat. Eyes: Negative for pain, discharge and redness. Respiratory: Negative for cough, shortness of breath and wheezing. Cardiovascular: Negative for chest pain. Gastrointestinal: Negative for abdominal distention, diarrhea, nausea and vomiting. Genitourinary: Negative for dysuria and frequency. Musculoskeletal: Negative for arthralgias and back pain. Skin: Negative for rash and wound. Neurological: Negative for weakness and headaches. Hematological: Negative for adenopathy. All other systems reviewed and are negative. Physical Exam   Constitutional: She is oriented to person, place, and time. She appears well-developed and well-nourished. HENT:   Head: Normocephalic and atraumatic. Mouth/Throat: Dental caries present. Eyes: Pupils are equal, round, and reactive to light. Neck: Normal range of motion. Neck supple. Cardiovascular: Normal rate, regular rhythm and normal heart sounds. No murmur heard. Pulmonary/Chest: Effort normal and breath sounds normal. No respiratory distress. She has no wheezes. She has no rales. Abdominal: Soft. Bowel sounds are normal. There is no tenderness. There is no rebound and no guarding. Musculoskeletal: She exhibits no edema. Neurological: She is alert and oriented to person, place, and time. No cranial nerve deficit. Coordination normal.   Skin: Skin is warm and dry. Nursing note and vitals reviewed.       Procedures    [facility]            --------------------------------------------- PAST HISTORY ---------------------------------------------  Past Medical History:  has a past medical history of Chronic kidney disease, Depression, Diabetes mellitus ([facility] Diabetic ketoacidosis ([facility] MDRO (multiple drug resistant organisms) resistance, MRSA (methicillin resistant Staphylococcus aureus), Non compliance w medication regimen, Other disorders of kidney and ureter, Pregnancy, and Severe pre-eclampsia in third trimester. Past Surgical History:  has a past surgical history that includes ECHO Compl W Dop Color Flow (2012); ECHO Compl W Dop Color Flow (6/10/2013);  section; Tunneled venous port placement (2018); pr esophagogastroduodenoscopy transoral diagnostic (N/A, 2018); back surgery; Colonoscopy (N/A, 2018); Colonoscopy (N/A, 2018); and Upper gastrointestinal endoscopy (2018). Social History:  reports that she has been smoking cigarettes. She has a 1.50 pack-year smoking history. She uses smokeless tobacco. She reports that she does not drink alcohol or use drugs. Family History: family history includes Asthma in her brother and mother; Diabetes in her mother; High Blood Pressure in her father and mother; Hypertension in her mother. The patients home medications have been reviewed. Allergies: Nicoderm [nicotine] and Toradol [ketorolac tromethamine]    -------------------------------------------------- RESULTS -------------------------------------------------  Labs:  No results found for this visit on 19. Radiology:  No orders to display       ------------------------- NURSING NOTES AND VITALS REVIEWED ---------------------------  Date / Time Roomed:  2019  6:30 PM  ED Bed Assignment:      The nursing notes within the ED encounter and vital signs as below have been reviewed. /75   Pulse 103   Temp 98.2 °F (36.8 °C)   Resp 16   Ht 5' 3\" (1.6 m)   Wt 138 lb (62.6 kg)   LMP 2019   SpO2 98%   Breastfeeding?  Unknown   BMI

## 2019-06-27 ENCOUNTER — APPOINTMENT (OUTPATIENT)
Dept: GENERAL RADIOLOGY | Age: 29
End: 2019-06-27
Payer: COMMERCIAL

## 2019-06-27 ENCOUNTER — HOSPITAL ENCOUNTER (EMERGENCY)
Age: 29
Discharge: HOME | End: 2019-06-27
Attending: FAMILY MEDICINE
Payer: COMMERCIAL

## 2019-06-27 VITALS
HEIGHT: 64 IN | BODY MASS INDEX: 24.41 KG/M2 | WEIGHT: 143 LBS | RESPIRATION RATE: 20 BRPM | HEART RATE: 99 BPM | TEMPERATURE: 98.3 F | OXYGEN SATURATION: 100 % | SYSTOLIC BLOOD PRESSURE: 132 MMHG | DIASTOLIC BLOOD PRESSURE: 99 MMHG

## 2019-06-27 DIAGNOSIS — S63.501A SPRAIN OF RIGHT WRIST, INITIAL ENCOUNTER: Primary | ICD-10-CM

## 2019-06-27 PROCEDURE — 99283 EMERGENCY DEPT VISIT LOW MDM: CPT

## 2019-06-27 PROCEDURE — 73110 X-RAY EXAM OF WRIST: CPT

## 2019-06-27 NOTE — ED PROVIDER NOTES
lymphadenopathy noted  Neurologic: GCS 15, no focal deficits, symmetric strength 5/5 in the upper and lower extremities bilaterally  Psychiatric: Normal Affect      ------------------------------ ED COURSE/MEDICAL DECISION MAKING----------------------  Medications - No data to display      Medical Decision Making: Patient given Velcro wrist splint for as needed. Recommended RICE and ibuprofen/tylenol PRN for pain. Follow up with PCP 3 days, or if worsening sx sooner or go to ER. Counseling: The emergency provider has spoken with the patient and discussed todays results, in addition to providing specific details for the plan of care and counseling regarding the diagnosis and prognosis. Questions are answered at this time and they are agreeable with the plan.      --------------------------------- IMPRESSION AND DISPOSITION ---------------------------------    IMPRESSION  1.  Sprain of right wrist, initial encounter        DISPOSITION  Disposition: Discharge to home  Patient condition is stable              Dian mSith MD  06/27/19 [id number]

## 2019-07-15 ENCOUNTER — TELEPHONE (OUTPATIENT)
Dept: ADMINISTRATIVE | Age: 29
End: 2019-07-15

## 2019-08-20 ENCOUNTER — APPOINTMENT (OUTPATIENT)
Dept: GENERAL RADIOLOGY | Age: 29
End: 2019-08-20
Payer: COMMERCIAL

## 2019-08-20 ENCOUNTER — HOSPITAL ENCOUNTER (EMERGENCY)
Age: 29
Discharge: HOME | End: 2019-08-21
Attending: EMERGENCY MEDICINE
Payer: COMMERCIAL

## 2019-08-20 DIAGNOSIS — N17.9 AKI (ACUTE KIDNEY INJURY) (HCC): ICD-10-CM

## 2019-08-20 DIAGNOSIS — N39.0 URINARY TRACT INFECTION WITH HEMATURIA, SITE UNSPECIFIED: ICD-10-CM

## 2019-08-20 DIAGNOSIS — E10.65 HYPERGLYCEMIA DUE TO TYPE 1 DIABETES MELLITUS (HCC): Primary | ICD-10-CM

## 2019-08-20 DIAGNOSIS — R31.9 URINARY TRACT INFECTION WITH HEMATURIA, SITE UNSPECIFIED: ICD-10-CM

## 2019-08-20 LAB
BACTERIA: ABNORMAL /HPF
BASOPHILS ABSOLUTE: 0.08 E9/L (ref 0–0.2)
BASOPHILS RELATIVE PERCENT: 1 % (ref 0–2)
BILIRUBIN URINE: NEGATIVE
BLOOD, URINE: ABNORMAL
CHP ED QC CHECK: YES
CLARITY: ABNORMAL
COLOR: YELLOW
EOSINOPHILS ABSOLUTE: 0.31 E9/L (ref 0.05–0.5)
EOSINOPHILS RELATIVE PERCENT: 4.1 % (ref 0–6)
EPITHELIAL CELLS, UA: ABNORMAL /HPF
GLUCOSE BLD-MCNC: NORMAL MG/DL
GLUCOSE URINE: >=1000 MG/DL
HCG, URINE, POC: NEGATIVE
HCT VFR BLD CALC: 28.3 % (ref 34–48)
HEMOGLOBIN: 9.1 G/DL (ref 11.5–15.5)
IMMATURE GRANULOCYTES #: 0.01 E9/L
IMMATURE GRANULOCYTES %: 0.1 % (ref 0–5)
KETONES, URINE: 15 MG/DL
LACTIC ACID: 2.7 MMOL/L (ref 0.5–2.2)
LEUKOCYTE ESTERASE, URINE: ABNORMAL
LIPASE: 9 U/L (ref 13–60)
LYMPHOCYTES ABSOLUTE: 2.08 E9/L (ref 1.5–4)
LYMPHOCYTES RELATIVE PERCENT: 27.3 % (ref 20–42)
Lab: NORMAL
MCH RBC QN AUTO: 29.3 PG (ref 26–35)
MCHC RBC AUTO-ENTMCNC: 32.2 % (ref 32–34.5)
MCV RBC AUTO: 91 FL (ref 80–99.9)
METER GLUCOSE: >500 MG/DL (ref 74–99)
MONOCYTES ABSOLUTE: 0.29 E9/L (ref 0.1–0.95)
MONOCYTES RELATIVE PERCENT: 3.8 % (ref 2–12)
NEGATIVE QC PASS/FAIL: NORMAL
NEUTROPHILS ABSOLUTE: 4.85 E9/L (ref 1.8–7.3)
NEUTROPHILS RELATIVE PERCENT: 63.7 % (ref 43–80)
NITRITE, URINE: NEGATIVE
PDW BLD-RTO: 12.5 FL (ref 11.5–15)
PH UA: 5.5 (ref 5–9)
PH VENOUS: 7.36 (ref 7.35–7.45)
PLATELET # BLD: 291 E9/L (ref 130–450)
PMV BLD AUTO: 10.5 FL (ref 7–12)
POSITIVE QC PASS/FAIL: NORMAL
PROTEIN UA: 100 MG/DL
RBC # BLD: 3.11 E12/L (ref 3.5–5.5)
RBC UA: ABNORMAL /HPF (ref 0–2)
SPECIFIC GRAVITY UA: <=1.005 (ref 1–1.03)
TROPONIN: <0.01 NG/ML (ref 0–0.03)
UROBILINOGEN, URINE: 0.2 E.U./DL
WBC # BLD: 7.6 E9/L (ref 4.5–11.5)
WBC UA: >20 /HPF (ref 0–5)

## 2019-08-20 PROCEDURE — 71045 X-RAY EXAM CHEST 1 VIEW: CPT

## 2019-08-20 PROCEDURE — 93005 ELECTROCARDIOGRAM TRACING: CPT | Performed by: EMERGENCY MEDICINE

## 2019-08-20 PROCEDURE — 84484 ASSAY OF TROPONIN QUANT: CPT

## 2019-08-20 PROCEDURE — 80053 COMPREHEN METABOLIC PANEL: CPT

## 2019-08-20 PROCEDURE — 99285 EMERGENCY DEPT VISIT HI MDM: CPT

## 2019-08-20 PROCEDURE — 85025 COMPLETE CBC W/AUTO DIFF WBC: CPT

## 2019-08-20 PROCEDURE — 83690 ASSAY OF LIPASE: CPT

## 2019-08-20 PROCEDURE — 82962 GLUCOSE BLOOD TEST: CPT

## 2019-08-20 PROCEDURE — 83605 ASSAY OF LACTIC ACID: CPT

## 2019-08-20 PROCEDURE — 2580000003 HC RX 258: Performed by: EMERGENCY MEDICINE

## 2019-08-20 PROCEDURE — 81001 URINALYSIS AUTO W/SCOPE: CPT

## 2019-08-20 PROCEDURE — 82800 BLOOD PH: CPT

## 2019-08-20 PROCEDURE — 87088 URINE BACTERIA CULTURE: CPT

## 2019-08-20 PROCEDURE — 87186 SC STD MICRODIL/AGAR DIL: CPT

## 2019-08-20 PROCEDURE — 82010 KETONE BODYS QUAN: CPT

## 2019-08-20 PROCEDURE — 36415 COLL VENOUS BLD VENIPUNCTURE: CPT

## 2019-08-20 RX ORDER — 0.9 % SODIUM CHLORIDE 0.9 %
2000 INTRAVENOUS SOLUTION INTRAVENOUS ONCE
Status: COMPLETED | OUTPATIENT
Start: 2019-08-20 | End: 2019-08-21

## 2019-08-20 RX ORDER — SODIUM CHLORIDE 0.9 % (FLUSH) 0.9 %
SYRINGE (ML) INJECTION
Status: DISCONTINUED
Start: 2019-08-20 | End: 2019-08-21 | Stop reason: HOSPADM

## 2019-08-20 RX ADMIN — SODIUM CHLORIDE 2000 ML: 9 INJECTION, SOLUTION INTRAVENOUS at 23:37

## 2019-08-20 ASSESSMENT — PAIN SCALES - GENERAL: PAINLEVEL_OUTOF10: 8

## 2019-08-20 ASSESSMENT — PAIN DESCRIPTION - LOCATION: LOCATION: GENERALIZED

## 2019-08-20 ASSESSMENT — PAIN DESCRIPTION - DESCRIPTORS: DESCRIPTORS: ACHING

## 2019-08-20 ASSESSMENT — PAIN DESCRIPTION - PAIN TYPE: TYPE: ACUTE PAIN

## 2019-08-21 VITALS
HEART RATE: 99 BPM | BODY MASS INDEX: 24.41 KG/M2 | HEIGHT: 64 IN | RESPIRATION RATE: 14 BRPM | SYSTOLIC BLOOD PRESSURE: 116 MMHG | TEMPERATURE: 97.8 F | OXYGEN SATURATION: 99 % | WEIGHT: 143 LBS | DIASTOLIC BLOOD PRESSURE: 82 MMHG

## 2019-08-21 LAB
ALBUMIN SERPL-MCNC: 3.4 G/DL (ref 3.5–5.2)
ALP BLD-CCNC: 123 U/L (ref 35–104)
ALT SERPL-CCNC: 8 U/L (ref 0–32)
ANION GAP SERPL CALCULATED.3IONS-SCNC: 11 MMOL/L (ref 7–16)
ANION GAP SERPL CALCULATED.3IONS-SCNC: 9 MMOL/L (ref 7–16)
AST SERPL-CCNC: 14 U/L (ref 0–31)
BETA-HYDROXYBUTYRATE: 0.73 MMOL/L (ref 0.02–0.27)
BILIRUB SERPL-MCNC: <0.2 MG/DL (ref 0–1.2)
BUN BLDV-MCNC: 30 MG/DL (ref 6–20)
BUN BLDV-MCNC: 35 MG/DL (ref 6–20)
CALCIUM SERPL-MCNC: 7.9 MG/DL (ref 8.6–10.2)
CALCIUM SERPL-MCNC: 9.1 MG/DL (ref 8.6–10.2)
CHLORIDE BLD-SCNC: 100 MMOL/L (ref 98–107)
CHLORIDE BLD-SCNC: 109 MMOL/L (ref 98–107)
CHP ED QC CHECK: YES
CHP ED QC CHECK: YES
CO2: 21 MMOL/L (ref 22–29)
CO2: 23 MMOL/L (ref 22–29)
CREAT SERPL-MCNC: 1.3 MG/DL (ref 0.5–1)
CREAT SERPL-MCNC: 1.7 MG/DL (ref 0.5–1)
EKG ATRIAL RATE: 111 BPM
EKG P AXIS: 49 DEGREES
EKG P-R INTERVAL: 112 MS
EKG Q-T INTERVAL: 316 MS
EKG QRS DURATION: 86 MS
EKG QTC CALCULATION (BAZETT): 429 MS
EKG R AXIS: 57 DEGREES
EKG T AXIS: 51 DEGREES
EKG VENTRICULAR RATE: 111 BPM
GFR AFRICAN AMERICAN: 44
GFR AFRICAN AMERICAN: 59
GFR NON-AFRICAN AMERICAN: 44 ML/MIN/1.73
GFR NON-AFRICAN AMERICAN: 59 ML/MIN/1.73
GLUCOSE BLD-MCNC: 106 MG/DL (ref 74–99)
GLUCOSE BLD-MCNC: 113 MG/DL
GLUCOSE BLD-MCNC: 548 MG/DL (ref 74–99)
GLUCOSE BLD-MCNC: 82 MG/DL
METER GLUCOSE: 113 MG/DL (ref 74–99)
METER GLUCOSE: 277 MG/DL (ref 74–99)
METER GLUCOSE: 70 MG/DL (ref 74–99)
METER GLUCOSE: 82 MG/DL (ref 74–99)
POTASSIUM REFLEX MAGNESIUM: 4.1 MMOL/L (ref 3.5–5)
POTASSIUM SERPL-SCNC: 3.6 MMOL/L (ref 3.5–5)
SODIUM BLD-SCNC: 134 MMOL/L (ref 132–146)
SODIUM BLD-SCNC: 139 MMOL/L (ref 132–146)
TOTAL PROTEIN: 6.5 G/DL (ref 6.4–8.3)

## 2019-08-21 PROCEDURE — 80048 BASIC METABOLIC PNL TOTAL CA: CPT

## 2019-08-21 PROCEDURE — 2580000003 HC RX 258: Performed by: EMERGENCY MEDICINE

## 2019-08-21 PROCEDURE — 96374 THER/PROPH/DIAG INJ IV PUSH: CPT

## 2019-08-21 PROCEDURE — 36415 COLL VENOUS BLD VENIPUNCTURE: CPT

## 2019-08-21 PROCEDURE — 6360000002 HC RX W HCPCS: Performed by: EMERGENCY MEDICINE

## 2019-08-21 PROCEDURE — 82962 GLUCOSE BLOOD TEST: CPT

## 2019-08-21 PROCEDURE — 96375 TX/PRO/DX INJ NEW DRUG ADDON: CPT

## 2019-08-21 PROCEDURE — 6370000000 HC RX 637 (ALT 250 FOR IP): Performed by: EMERGENCY MEDICINE

## 2019-08-21 PROCEDURE — 6360000002 HC RX W HCPCS

## 2019-08-21 PROCEDURE — 93010 ELECTROCARDIOGRAM REPORT: CPT | Performed by: INTERNAL MEDICINE

## 2019-08-21 RX ORDER — 0.9 % SODIUM CHLORIDE 0.9 %
1000 INTRAVENOUS SOLUTION INTRAVENOUS ONCE
Status: COMPLETED | OUTPATIENT
Start: 2019-08-21 | End: 2019-08-21

## 2019-08-21 RX ORDER — HEPARIN SODIUM (PORCINE) LOCK FLUSH IV SOLN 100 UNIT/ML 100 UNIT/ML
SOLUTION INTRAVENOUS
Status: COMPLETED
Start: 2019-08-21 | End: 2019-08-21

## 2019-08-21 RX ORDER — HEPARIN SODIUM (PORCINE) LOCK FLUSH IV SOLN 100 UNIT/ML 100 UNIT/ML
300 SOLUTION INTRAVENOUS ONCE
Status: COMPLETED | OUTPATIENT
Start: 2019-08-21 | End: 2019-08-21

## 2019-08-21 RX ORDER — CEFDINIR 300 MG/1
300 CAPSULE ORAL 2 TIMES DAILY
Qty: 10 CAPSULE | Refills: 0 | Status: SHIPPED | OUTPATIENT
Start: 2019-08-21 | End: 2019-08-26

## 2019-08-21 RX ORDER — DEXTROSE MONOHYDRATE 25 G/50ML
25 INJECTION, SOLUTION INTRAVENOUS ONCE
Status: COMPLETED | OUTPATIENT
Start: 2019-08-21 | End: 2019-08-21

## 2019-08-21 RX ADMIN — SODIUM CHLORIDE 1000 ML: 9 INJECTION, SOLUTION INTRAVENOUS at 02:31

## 2019-08-21 RX ADMIN — INSULIN HUMAN 10 UNITS: 100 INJECTION, SOLUTION PARENTERAL at 00:44

## 2019-08-21 RX ADMIN — HEPARIN SODIUM (PORCINE) LOCK FLUSH IV SOLN 100 UNIT/ML 300 UNITS: 100 SOLUTION at 04:05

## 2019-08-21 RX ADMIN — Medication 300 UNITS: at 04:05

## 2019-08-21 RX ADMIN — DEXTROSE MONOHYDRATE 25 G: 500 INJECTION PARENTERAL at 02:03

## 2019-08-21 RX ADMIN — WATER 2 G: 1 INJECTION INTRAMUSCULAR; INTRAVENOUS; SUBCUTANEOUS at 02:32

## 2019-08-21 NOTE — ED PROVIDER NOTES
HPI:  8/20/19, Time: 10:26 PM         Celi Unger is a 34 y.o. female presenting to the ED for high blood sugar, chest pain, shortness of breath, and generalized body aches, beginning several months ago. The complaint has been persistent, moderate in severity, and worsened by nothing. Patient has a history of insulin dependent diabetes for which she takes insulin. She states that her blood sugars have always been running high, ever since she was diagnosed. She states that she checked her blood sugar today and it was greater than 500 on her glucometer. She states that she has been taking all of her insulin as normal.  She also complains of chest pain shortness of breath but states that these have been present for the last several months to years. She states that she has been evaluated for these complaints and was told she had some nodules in her lungs but no other abnormalities. She denies any change in her chest pain or shortness of breath. She also complains of some generalized body aches but states that this is been also present for the last several months. She can planes of some mild nausea but no vomiting, diarrhea, or changes in urination. She denies any fevers or chills. She denies any cough or URI symptoms. She states that she is scheduled to see an endocrinologist within the next few days.     Review of Systems:   Pertinent positives and negatives are stated within HPI, all other systems reviewed and are negative.          --------------------------------------------- PAST HISTORY ---------------------------------------------  Past Medical History:  has a past medical history of Chronic kidney disease, Depression, Diabetes mellitus ([address] Diabetic ketoacidosis ([facility] [address] MDRO (multiple drug resistant organisms) resistance, MRSA (methicillin resistant Staphylococcus aureus), Non compliance w medication regimen, Other disorders of kidney and ureter, Pregnancy, and Severe pre-eclampsia in third

## 2019-08-21 NOTE — ED NOTES
Pt medicated per order. NAD noted. Will continue to monitor.         George Hargrove RN  08/21/19 [id number]

## 2019-08-23 LAB
ORGANISM: ABNORMAL
URINE CULTURE, ROUTINE: ABNORMAL

## 2019-08-26 ENCOUNTER — HOSPITAL ENCOUNTER (EMERGENCY)
Age: 29
Discharge: HOME | End: 2019-08-26
Attending: EMERGENCY MEDICINE
Payer: COMMERCIAL

## 2019-08-26 VITALS
OXYGEN SATURATION: 98 % | DIASTOLIC BLOOD PRESSURE: 74 MMHG | WEIGHT: 143 LBS | TEMPERATURE: 97.9 F | RESPIRATION RATE: 14 BRPM | BODY MASS INDEX: 24.55 KG/M2 | HEART RATE: 105 BPM | SYSTOLIC BLOOD PRESSURE: 107 MMHG

## 2019-08-26 DIAGNOSIS — S09.90XA INJURY OF HEAD, INITIAL ENCOUNTER: Primary | ICD-10-CM

## 2019-08-26 DIAGNOSIS — R56.9 SEIZURE (HCC): ICD-10-CM

## 2019-08-26 PROCEDURE — G0383 LEV 4 HOSP TYPE B ED VISIT: HCPCS

## 2019-08-26 ASSESSMENT — PAIN SCALES - GENERAL: PAINLEVEL_OUTOF10: 9

## 2019-08-26 ASSESSMENT — PAIN DESCRIPTION - PAIN TYPE: TYPE: ACUTE PAIN

## 2019-08-26 ASSESSMENT — PAIN DESCRIPTION - DESCRIPTORS: DESCRIPTORS: SORE

## 2019-08-26 ASSESSMENT — PAIN DESCRIPTION - PROGRESSION
CLINICAL_PROGRESSION: NOT CHANGED
CLINICAL_PROGRESSION: NOT CHANGED

## 2019-08-26 ASSESSMENT — PAIN DESCRIPTION - ORIENTATION: ORIENTATION: RIGHT

## 2019-08-26 ASSESSMENT — PAIN DESCRIPTION - LOCATION: LOCATION: HEAD;NECK

## 2019-08-26 ASSESSMENT — PAIN DESCRIPTION - FREQUENCY: FREQUENCY: CONTINUOUS

## 2019-08-27 ENCOUNTER — TELEPHONE (OUTPATIENT)
Dept: CARDIOLOGY CLINIC | Age: 29
End: 2019-08-27

## 2019-09-04 ENCOUNTER — HOSPITAL ENCOUNTER (EMERGENCY)
Age: 29
Discharge: HOME | End: 2019-09-04
Attending: EMERGENCY MEDICINE
Payer: COMMERCIAL

## 2019-09-04 VITALS
TEMPERATURE: 98.4 F | WEIGHT: 188 LBS | DIASTOLIC BLOOD PRESSURE: 95 MMHG | OXYGEN SATURATION: 98 % | SYSTOLIC BLOOD PRESSURE: 137 MMHG | BODY MASS INDEX: 32.1 KG/M2 | HEIGHT: 64 IN | HEART RATE: 111 BPM | RESPIRATION RATE: 16 BRPM

## 2019-09-04 DIAGNOSIS — J01.00 ACUTE MAXILLARY SINUSITIS, RECURRENCE NOT SPECIFIED: Primary | ICD-10-CM

## 2019-09-04 PROCEDURE — G0381 LEV 2 HOSP TYPE B ED VISIT: HCPCS

## 2019-09-04 RX ORDER — CEFDINIR 300 MG/1
300 CAPSULE ORAL 2 TIMES DAILY
Qty: 20 CAPSULE | Refills: 0 | Status: SHIPPED | OUTPATIENT
Start: 2019-09-04 | End: 2019-09-14

## 2019-09-04 ASSESSMENT — PAIN DESCRIPTION - DESCRIPTORS: DESCRIPTORS: CONSTANT;ACHING

## 2019-09-04 ASSESSMENT — PAIN DESCRIPTION - PAIN TYPE: TYPE: ACUTE PAIN

## 2019-09-04 ASSESSMENT — PAIN DESCRIPTION - ONSET: ONSET: ON-GOING

## 2019-09-04 ASSESSMENT — PAIN DESCRIPTION - PROGRESSION: CLINICAL_PROGRESSION: NOT CHANGED

## 2019-09-04 NOTE — ED PROVIDER NOTES
Constitutional/General: Alert and oriented x3, well appearing, non toxic in NAD  Head: Normocephalic and atraumatic  Eyes: PERRL, EOMI  Ears reveal bilateral serous effusion. No erythema  Nares reveal nasal congestion with yellow rhinorrhea  Mouth: Oropharynx clear, handling secretions, no trismus. No Erythema no exudate  Neck: Supple, full ROM, non tender to palpation in the midline, no stridor, no crepitus, no meningeal signs  Pulmonary: Lungs clear to auscultation bilaterally, no wheezes, rales, or rhonchi. Not in respiratory distress  Cardiovascular:  Regular rate. Regular rhythm. No murmurs, gallops, or rubs. 2+ distal pulses  Chest: no chest wall tenderness  Abdomen: Soft. Non tender. Non distended. +BS. No rebound, guarding, or rigidity. No pulsatile masses appreciated. Musculoskeletal: Moves all extremities x 4. Warm and well perfused, no clubbing, cyanosis, or edema. Capillary refill <3 seconds  Skin: warm and dry. No rashes. Neurologic: GCS 15, CN 2-12 grossly intact, no focal deficits, symmetric strength 5/5 in the upper and lower extremities bilaterally  Psych: Normal Affect    -------------------------------------------------- RESULTS -------------------------------------------------  I have personally reviewed all laboratory and imaging results for this patient. Results are listed below. LABS:  No results found for this visit on 09/04/19. RADIOLOGY:  Interpreted by Radiologist.  No orders to display             ------------------------- NURSING NOTES AND VITALS REVIEWED ---------------------------   The nursing notes within the ED encounter and vital signs as below have been reviewed by myself. BP (!) 137/95   Pulse 111   Temp 98.4 °F (36.9 °C) (Oral)   Resp 16   Ht 5' 4\" (1.626 m)   Wt 188 lb (85.3 kg)   LMP 08/26/2019   SpO2 98%   BMI 32.27 kg/m²   Oxygen Saturation Interpretation: Normal    The patients available past medical records and past encounters were reviewed.

## 2019-10-01 ENCOUNTER — HOSPITAL ENCOUNTER (INPATIENT)
Age: 29
LOS: 16 days | Discharge: HOME | DRG: 420 | End: 2019-10-17
Attending: EMERGENCY MEDICINE | Admitting: INTERNAL MEDICINE
Payer: COMMERCIAL

## 2019-10-01 DIAGNOSIS — E10.10 DIABETIC KETOACIDOSIS WITHOUT COMA ASSOCIATED WITH TYPE 1 DIABETES MELLITUS (HCC): Primary | ICD-10-CM

## 2019-10-01 DIAGNOSIS — N17.9 ACUTE RENAL FAILURE, UNSPECIFIED ACUTE RENAL FAILURE TYPE (HCC): ICD-10-CM

## 2019-10-01 DIAGNOSIS — N17.9 AKI (ACUTE KIDNEY INJURY) (HCC): ICD-10-CM

## 2019-10-01 PROBLEM — E87.29 HIGH ANION GAP METABOLIC ACIDOSIS: Status: ACTIVE | Noted: 2019-10-01

## 2019-10-01 PROBLEM — K52.9 ACUTE GASTROENTERITIS: Status: ACTIVE | Noted: 2019-10-01

## 2019-10-01 PROBLEM — D50.9 IRON DEFICIENCY ANEMIA: Chronic | Status: ACTIVE | Noted: 2019-10-01

## 2019-10-01 LAB
ALBUMIN SERPL-MCNC: 3.1 G/DL (ref 3.5–5.2)
ALP BLD-CCNC: 125 U/L (ref 35–104)
ALT SERPL-CCNC: 9 U/L (ref 0–32)
ANION GAP SERPL CALCULATED.3IONS-SCNC: 19 MMOL/L (ref 7–16)
AST SERPL-CCNC: 13 U/L (ref 0–31)
BACTERIA: ABNORMAL /HPF
BASOPHILS ABSOLUTE: 0.13 E9/L (ref 0–0.2)
BASOPHILS RELATIVE PERCENT: 1.2 % (ref 0–2)
BETA-HYDROXYBUTYRATE: 7 MMOL/L (ref 0.02–0.27)
BILIRUB SERPL-MCNC: 0.3 MG/DL (ref 0–1.2)
BILIRUBIN URINE: NEGATIVE
BLOOD, URINE: ABNORMAL
BUN BLDV-MCNC: 44 MG/DL (ref 6–20)
CALCIUM SERPL-MCNC: 9.3 MG/DL (ref 8.6–10.2)
CHLORIDE BLD-SCNC: 102 MMOL/L (ref 98–107)
CHP ED QC CHECK: YES
CLARITY: CLEAR
CO2: 14 MMOL/L (ref 22–29)
COLOR: YELLOW
CREAT SERPL-MCNC: 2.6 MG/DL (ref 0.5–1)
EOSINOPHILS ABSOLUTE: 0.27 E9/L (ref 0.05–0.5)
EOSINOPHILS RELATIVE PERCENT: 2.6 % (ref 0–6)
EPITHELIAL CELLS, UA: ABNORMAL /HPF
GFR AFRICAN AMERICAN: 27
GFR NON-AFRICAN AMERICAN: 27 ML/MIN/1.73
GLUCOSE BLD-MCNC: 446 MG/DL
GLUCOSE BLD-MCNC: 472 MG/DL (ref 74–99)
GLUCOSE URINE: >=1000 MG/DL
HCG, URINE, POC: NEGATIVE
HCT VFR BLD CALC: 25.6 % (ref 34–48)
HEMOGLOBIN: 8.6 G/DL (ref 11.5–15.5)
IMMATURE GRANULOCYTES #: 0.05 E9/L
IMMATURE GRANULOCYTES %: 0.5 % (ref 0–5)
KETONES, URINE: 40 MG/DL
LACTIC ACID, SEPSIS: 1.2 MMOL/L (ref 0.5–1.9)
LEUKOCYTE ESTERASE, URINE: NEGATIVE
LIPASE: 9 U/L (ref 13–60)
LYMPHOCYTES ABSOLUTE: 1.59 E9/L (ref 1.5–4)
LYMPHOCYTES RELATIVE PERCENT: 15.3 % (ref 20–42)
Lab: NORMAL
MAGNESIUM: 1.7 MG/DL (ref 1.6–2.6)
MCH RBC QN AUTO: 30.3 PG (ref 26–35)
MCHC RBC AUTO-ENTMCNC: 33.6 % (ref 32–34.5)
MCV RBC AUTO: 90.1 FL (ref 80–99.9)
METER GLUCOSE: 446 MG/DL (ref 74–99)
METER GLUCOSE: 467 MG/DL (ref 74–99)
MONOCYTES ABSOLUTE: 0.39 E9/L (ref 0.1–0.95)
MONOCYTES RELATIVE PERCENT: 3.7 % (ref 2–12)
NEGATIVE QC PASS/FAIL: NORMAL
NEUTROPHILS ABSOLUTE: 7.99 E9/L (ref 1.8–7.3)
NEUTROPHILS RELATIVE PERCENT: 76.7 % (ref 43–80)
NITRITE, URINE: NEGATIVE
PDW BLD-RTO: 13.6 FL (ref 11.5–15)
PH UA: 7 (ref 5–9)
PH VENOUS: 7.27 (ref 7.35–7.45)
PHOSPHORUS: 4.2 MG/DL (ref 2.5–4.5)
PLATELET # BLD: 353 E9/L (ref 130–450)
PMV BLD AUTO: 10.2 FL (ref 7–12)
POSITIVE QC PASS/FAIL: NORMAL
POTASSIUM SERPL-SCNC: 4.3 MMOL/L (ref 3.5–5)
PROTEIN UA: >=300 MG/DL
RBC # BLD: 2.84 E12/L (ref 3.5–5.5)
RBC UA: >20 /HPF (ref 0–2)
SODIUM BLD-SCNC: 135 MMOL/L (ref 132–146)
SPECIFIC GRAVITY UA: 1.02 (ref 1–1.03)
TOTAL PROTEIN: 6.7 G/DL (ref 6.4–8.3)
TROPONIN: 0.03 NG/ML (ref 0–0.03)
UROBILINOGEN, URINE: 0.2 E.U./DL
WBC # BLD: 10.4 E9/L (ref 4.5–11.5)
WBC UA: ABNORMAL /HPF (ref 0–5)

## 2019-10-01 PROCEDURE — 2580000003 HC RX 258: Performed by: STUDENT IN AN ORGANIZED HEALTH CARE EDUCATION/TRAINING PROGRAM

## 2019-10-01 PROCEDURE — 84300 ASSAY OF URINE SODIUM: CPT

## 2019-10-01 PROCEDURE — 81001 URINALYSIS AUTO W/SCOPE: CPT

## 2019-10-01 PROCEDURE — 82010 KETONE BODYS QUAN: CPT

## 2019-10-01 PROCEDURE — 83690 ASSAY OF LIPASE: CPT

## 2019-10-01 PROCEDURE — 84484 ASSAY OF TROPONIN QUANT: CPT

## 2019-10-01 PROCEDURE — 80053 COMPREHEN METABOLIC PANEL: CPT

## 2019-10-01 PROCEDURE — 85025 COMPLETE CBC W/AUTO DIFF WBC: CPT

## 2019-10-01 PROCEDURE — 82800 BLOOD PH: CPT

## 2019-10-01 PROCEDURE — 84133 ASSAY OF URINE POTASSIUM: CPT

## 2019-10-01 PROCEDURE — 99223 1ST HOSP IP/OBS HIGH 75: CPT | Performed by: INTERNAL MEDICINE

## 2019-10-01 PROCEDURE — 93005 ELECTROCARDIOGRAM TRACING: CPT | Performed by: STUDENT IN AN ORGANIZED HEALTH CARE EDUCATION/TRAINING PROGRAM

## 2019-10-01 PROCEDURE — 6360000002 HC RX W HCPCS: Performed by: STUDENT IN AN ORGANIZED HEALTH CARE EDUCATION/TRAINING PROGRAM

## 2019-10-01 PROCEDURE — 96374 THER/PROPH/DIAG INJ IV PUSH: CPT

## 2019-10-01 PROCEDURE — 6370000000 HC RX 637 (ALT 250 FOR IP): Performed by: STUDENT IN AN ORGANIZED HEALTH CARE EDUCATION/TRAINING PROGRAM

## 2019-10-01 PROCEDURE — 36415 COLL VENOUS BLD VENIPUNCTURE: CPT

## 2019-10-01 PROCEDURE — 82310 ASSAY OF CALCIUM: CPT

## 2019-10-01 PROCEDURE — 82962 GLUCOSE BLOOD TEST: CPT

## 2019-10-01 PROCEDURE — 99285 EMERGENCY DEPT VISIT HI MDM: CPT

## 2019-10-01 PROCEDURE — 80307 DRUG TEST PRSMV CHEM ANLYZR: CPT

## 2019-10-01 PROCEDURE — 83735 ASSAY OF MAGNESIUM: CPT

## 2019-10-01 PROCEDURE — 84100 ASSAY OF PHOSPHORUS: CPT

## 2019-10-01 PROCEDURE — 83605 ASSAY OF LACTIC ACID: CPT

## 2019-10-01 PROCEDURE — 2000000000 HC ICU R&B

## 2019-10-01 PROCEDURE — 82436 ASSAY OF URINE CHLORIDE: CPT

## 2019-10-01 RX ORDER — SODIUM CHLORIDE 0.9 % (FLUSH) 0.9 %
10 SYRINGE (ML) INJECTION PRN
Status: DISCONTINUED | OUTPATIENT
Start: 2019-10-01 | End: 2019-10-17 | Stop reason: HOSPADM

## 2019-10-01 RX ORDER — DEXTROSE AND SODIUM CHLORIDE 5; .45 G/100ML; G/100ML
INJECTION, SOLUTION INTRAVENOUS CONTINUOUS PRN
Status: DISCONTINUED | OUTPATIENT
Start: 2019-10-01 | End: 2019-10-07

## 2019-10-01 RX ORDER — PANTOPRAZOLE SODIUM 40 MG/10ML
40 INJECTION, POWDER, LYOPHILIZED, FOR SOLUTION INTRAVENOUS 2 TIMES DAILY
Status: DISCONTINUED | OUTPATIENT
Start: 2019-10-02 | End: 2019-10-02

## 2019-10-01 RX ORDER — ONDANSETRON 2 MG/ML
4 INJECTION INTRAMUSCULAR; INTRAVENOUS ONCE
Status: COMPLETED | OUTPATIENT
Start: 2019-10-01 | End: 2019-10-01

## 2019-10-01 RX ORDER — POTASSIUM CHLORIDE 7.45 MG/ML
10 INJECTION INTRAVENOUS PRN
Status: DISCONTINUED | OUTPATIENT
Start: 2019-10-01 | End: 2019-10-02

## 2019-10-01 RX ORDER — MAGNESIUM SULFATE 1 G/100ML
1 INJECTION INTRAVENOUS PRN
Status: DISCONTINUED | OUTPATIENT
Start: 2019-10-01 | End: 2019-10-02

## 2019-10-01 RX ORDER — DEXTROSE MONOHYDRATE 25 G/50ML
12.5 INJECTION, SOLUTION INTRAVENOUS PRN
Status: DISCONTINUED | OUTPATIENT
Start: 2019-10-01 | End: 2019-10-02

## 2019-10-01 RX ORDER — 0.9 % SODIUM CHLORIDE 0.9 %
1000 INTRAVENOUS SOLUTION INTRAVENOUS ONCE
Status: COMPLETED | OUTPATIENT
Start: 2019-10-01 | End: 2019-10-01

## 2019-10-01 RX ORDER — ACETAMINOPHEN 325 MG/1
650 TABLET ORAL EVERY 4 HOURS PRN
Status: DISCONTINUED | OUTPATIENT
Start: 2019-10-01 | End: 2019-10-04

## 2019-10-01 RX ORDER — SODIUM CHLORIDE 9 MG/ML
INJECTION, SOLUTION INTRAVENOUS CONTINUOUS
Status: DISCONTINUED | OUTPATIENT
Start: 2019-10-01 | End: 2019-10-03 | Stop reason: ALTCHOICE

## 2019-10-01 RX ORDER — 0.9 % SODIUM CHLORIDE 0.9 %
10 VIAL (ML) INJECTION 2 TIMES DAILY
Status: DISCONTINUED | OUTPATIENT
Start: 2019-10-02 | End: 2019-10-02

## 2019-10-01 RX ORDER — SODIUM CHLORIDE 0.9 % (FLUSH) 0.9 %
10 SYRINGE (ML) INJECTION EVERY 12 HOURS SCHEDULED
Status: DISCONTINUED | OUTPATIENT
Start: 2019-10-02 | End: 2019-10-17 | Stop reason: HOSPADM

## 2019-10-01 RX ORDER — ONDANSETRON 2 MG/ML
4 INJECTION INTRAMUSCULAR; INTRAVENOUS EVERY 6 HOURS PRN
Status: DISCONTINUED | OUTPATIENT
Start: 2019-10-01 | End: 2019-10-06

## 2019-10-01 RX ADMIN — SODIUM CHLORIDE 1000 ML: 9 INJECTION, SOLUTION INTRAVENOUS at 20:52

## 2019-10-01 RX ADMIN — SODIUM CHLORIDE: 9 INJECTION, SOLUTION INTRAVENOUS at 22:38

## 2019-10-01 RX ADMIN — SODIUM CHLORIDE 1000 ML: 9 INJECTION, SOLUTION INTRAVENOUS at 20:50

## 2019-10-01 RX ADMIN — TRIMETHOBENZAMIDE HYDROCHLORIDE 200 MG: 100 INJECTION INTRAMUSCULAR at 22:35

## 2019-10-01 RX ADMIN — ONDANSETRON 4 MG: 2 INJECTION INTRAMUSCULAR; INTRAVENOUS at 20:52

## 2019-10-01 RX ADMIN — SODIUM CHLORIDE 0.1 UNITS/KG/HR: 9 INJECTION, SOLUTION INTRAVENOUS at 22:27

## 2019-10-01 ASSESSMENT — ENCOUNTER SYMPTOMS
EYE REDNESS: 0
ABDOMINAL PAIN: 1
SINUS PRESSURE: 0
EYE PAIN: 0
SORE THROAT: 0
WHEEZING: 0
BACK PAIN: 0
NAUSEA: 1
SHORTNESS OF BREATH: 0
DIARRHEA: 0
ABDOMINAL DISTENTION: 0
EYE DISCHARGE: 0
COUGH: 0
VOMITING: 1

## 2019-10-01 ASSESSMENT — PAIN SCALES - GENERAL: PAINLEVEL_OUTOF10: 10

## 2019-10-01 ASSESSMENT — PAIN DESCRIPTION - DESCRIPTORS: DESCRIPTORS: ACHING;HEADACHE

## 2019-10-02 ENCOUNTER — APPOINTMENT (OUTPATIENT)
Dept: ULTRASOUND IMAGING | Age: 29
DRG: 420 | End: 2019-10-02
Payer: COMMERCIAL

## 2019-10-02 LAB
ABO/RH: NORMAL
ALBUMIN SERPL-MCNC: 2.6 G/DL (ref 3.5–5.2)
ALP BLD-CCNC: 103 U/L (ref 35–104)
ALT SERPL-CCNC: 6 U/L (ref 0–32)
AMPHETAMINE SCREEN, URINE: NOT DETECTED
ANION GAP SERPL CALCULATED.3IONS-SCNC: 14 MMOL/L (ref 7–16)
ANION GAP SERPL CALCULATED.3IONS-SCNC: 16 MMOL/L (ref 7–16)
ANION GAP SERPL CALCULATED.3IONS-SCNC: 6 MMOL/L (ref 7–16)
ANION GAP SERPL CALCULATED.3IONS-SCNC: 7 MMOL/L (ref 7–16)
ANTIBODY SCREEN: NORMAL
AST SERPL-CCNC: 14 U/L (ref 0–31)
BARBITURATE SCREEN URINE: NOT DETECTED
BASOPHILS ABSOLUTE: 0.1 E9/L (ref 0–0.2)
BASOPHILS ABSOLUTE: 0.12 E9/L (ref 0–0.2)
BASOPHILS RELATIVE PERCENT: 0.8 % (ref 0–2)
BASOPHILS RELATIVE PERCENT: 1 % (ref 0–2)
BENZODIAZEPINE SCREEN, URINE: NOT DETECTED
BILIRUB SERPL-MCNC: <0.2 MG/DL (ref 0–1.2)
BILIRUBIN DIRECT: <0.2 MG/DL (ref 0–0.3)
BILIRUBIN, INDIRECT: ABNORMAL MG/DL (ref 0–1)
BLOOD BANK DISPENSE STATUS: NORMAL
BLOOD BANK PRODUCT CODE: NORMAL
BPU ID: NORMAL
BUN BLDV-MCNC: 33 MG/DL (ref 6–20)
BUN BLDV-MCNC: 35 MG/DL (ref 6–20)
BUN BLDV-MCNC: 38 MG/DL (ref 6–20)
BUN BLDV-MCNC: 42 MG/DL (ref 6–20)
C-REACTIVE PROTEIN: 0.3 MG/DL (ref 0–0.4)
CALCIUM SERPL-MCNC: 7.6 MG/DL (ref 8.6–10.2)
CALCIUM SERPL-MCNC: 8 MG/DL (ref 8.6–10.2)
CALCIUM SERPL-MCNC: 8.1 MG/DL (ref 8.6–10.2)
CALCIUM SERPL-MCNC: 8.4 MG/DL (ref 8.6–10.2)
CALCIUM URINE: 1.2 MG/DL
CANNABINOID SCREEN URINE: NOT DETECTED
CHLORIDE BLD-SCNC: 107 MMOL/L (ref 98–107)
CHLORIDE BLD-SCNC: 110 MMOL/L (ref 98–107)
CHLORIDE BLD-SCNC: 112 MMOL/L (ref 98–107)
CHLORIDE BLD-SCNC: 113 MMOL/L (ref 98–107)
CHLORIDE URINE RANDOM: 49 MMOL/L
CHLORIDE URINE RANDOM: 80 MMOL/L
CO2: 15 MMOL/L (ref 22–29)
CO2: 16 MMOL/L (ref 22–29)
CO2: 18 MMOL/L (ref 22–29)
CO2: 20 MMOL/L (ref 22–29)
COCAINE METABOLITE SCREEN URINE: NOT DETECTED
CREAT SERPL-MCNC: 2.3 MG/DL (ref 0.5–1)
CREAT SERPL-MCNC: 2.3 MG/DL (ref 0.5–1)
CREAT SERPL-MCNC: 2.4 MG/DL (ref 0.5–1)
CREAT SERPL-MCNC: 2.5 MG/DL (ref 0.5–1)
CREATININE URINE: 62 MG/DL (ref 29–226)
DESCRIPTION BLOOD BANK: NORMAL
EOSINOPHILS ABSOLUTE: 0.07 E9/L (ref 0.05–0.5)
EOSINOPHILS ABSOLUTE: 0.28 E9/L (ref 0.05–0.5)
EOSINOPHILS RELATIVE PERCENT: 0.6 % (ref 0–6)
EOSINOPHILS RELATIVE PERCENT: 2.4 % (ref 0–6)
FERRITIN: 63 NG/ML
FOLATE: 12.7 NG/ML (ref 4.8–24.2)
GFR AFRICAN AMERICAN: 28
GFR AFRICAN AMERICAN: 29
GFR AFRICAN AMERICAN: 31
GFR AFRICAN AMERICAN: 31
GFR NON-AFRICAN AMERICAN: 28 ML/MIN/1.73
GFR NON-AFRICAN AMERICAN: 29 ML/MIN/1.73
GFR NON-AFRICAN AMERICAN: 31 ML/MIN/1.73
GFR NON-AFRICAN AMERICAN: 31 ML/MIN/1.73
GLUCOSE BLD-MCNC: 132 MG/DL (ref 74–99)
GLUCOSE BLD-MCNC: 170 MG/DL (ref 74–99)
GLUCOSE BLD-MCNC: 234 MG/DL (ref 74–99)
GLUCOSE BLD-MCNC: 307 MG/DL (ref 74–99)
HBA1C MFR BLD: 8.8 % (ref 4–5.6)
HCT VFR BLD CALC: 19.3 % (ref 34–48)
HCT VFR BLD CALC: 25.6 % (ref 34–48)
HEMOGLOBIN: 6.5 G/DL (ref 11.5–15.5)
HEMOGLOBIN: 8.5 G/DL (ref 11.5–15.5)
IMMATURE GRANULOCYTES #: 0.05 E9/L
IMMATURE GRANULOCYTES #: 0.06 E9/L
IMMATURE GRANULOCYTES %: 0.4 % (ref 0–5)
IMMATURE GRANULOCYTES %: 0.5 % (ref 0–5)
IRON SATURATION: 25 % (ref 15–50)
IRON: 38 MCG/DL (ref 37–145)
LACTIC ACID, SEPSIS: 1.5 MMOL/L (ref 0.5–1.9)
LYMPHOCYTES ABSOLUTE: 1.79 E9/L (ref 1.5–4)
LYMPHOCYTES ABSOLUTE: 1.91 E9/L (ref 1.5–4)
LYMPHOCYTES RELATIVE PERCENT: 15 % (ref 20–42)
LYMPHOCYTES RELATIVE PERCENT: 16.4 % (ref 20–42)
Lab: NORMAL
MAGNESIUM: 1.4 MG/DL (ref 1.6–2.6)
MAGNESIUM: 1.4 MG/DL (ref 1.6–2.6)
MAGNESIUM: 1.7 MG/DL (ref 1.6–2.6)
MAGNESIUM: 2 MG/DL (ref 1.6–2.6)
MAGNESIUM: 2.2 MG/DL (ref 1.6–2.6)
MCH RBC QN AUTO: 29 PG (ref 26–35)
MCH RBC QN AUTO: 29.8 PG (ref 26–35)
MCHC RBC AUTO-ENTMCNC: 33.2 % (ref 32–34.5)
MCHC RBC AUTO-ENTMCNC: 33.7 % (ref 32–34.5)
MCV RBC AUTO: 87.4 FL (ref 80–99.9)
MCV RBC AUTO: 88.5 FL (ref 80–99.9)
METER GLUCOSE: 107 MG/DL (ref 74–99)
METER GLUCOSE: 131 MG/DL (ref 74–99)
METER GLUCOSE: 169 MG/DL (ref 74–99)
METER GLUCOSE: 174 MG/DL (ref 74–99)
METER GLUCOSE: 180 MG/DL (ref 74–99)
METER GLUCOSE: 208 MG/DL (ref 74–99)
METER GLUCOSE: 234 MG/DL (ref 74–99)
METER GLUCOSE: 250 MG/DL (ref 74–99)
METER GLUCOSE: 256 MG/DL (ref 74–99)
METER GLUCOSE: 273 MG/DL (ref 74–99)
METER GLUCOSE: 317 MG/DL (ref 74–99)
METER GLUCOSE: 70 MG/DL (ref 74–99)
METER GLUCOSE: 89 MG/DL (ref 74–99)
METHADONE SCREEN, URINE: NOT DETECTED
MONOCYTES ABSOLUTE: 0.39 E9/L (ref 0.1–0.95)
MONOCYTES ABSOLUTE: 0.55 E9/L (ref 0.1–0.95)
MONOCYTES RELATIVE PERCENT: 3.3 % (ref 2–12)
MONOCYTES RELATIVE PERCENT: 4.6 % (ref 2–12)
NEUTROPHILS ABSOLUTE: 8.9 E9/L (ref 1.8–7.3)
NEUTROPHILS ABSOLUTE: 9.33 E9/L (ref 1.8–7.3)
NEUTROPHILS RELATIVE PERCENT: 76.5 % (ref 43–80)
NEUTROPHILS RELATIVE PERCENT: 78.5 % (ref 43–80)
OPIATE SCREEN URINE: NOT DETECTED
PDW BLD-RTO: 13.4 FL (ref 11.5–15)
PDW BLD-RTO: 14.5 FL (ref 11.5–15)
PHENCYCLIDINE SCREEN URINE: NOT DETECTED
PHOSPHORUS: 2.2 MG/DL (ref 2.5–4.5)
PHOSPHORUS: 2.5 MG/DL (ref 2.5–4.5)
PHOSPHORUS: 2.8 MG/DL (ref 2.5–4.5)
PHOSPHORUS: 3.1 MG/DL (ref 2.5–4.5)
PLATELET # BLD: 271 E9/L (ref 130–450)
PLATELET # BLD: 304 E9/L (ref 130–450)
PMV BLD AUTO: 10 FL (ref 7–12)
PMV BLD AUTO: 10.4 FL (ref 7–12)
POTASSIUM REFLEX MAGNESIUM: 3.7 MMOL/L (ref 3.5–5)
POTASSIUM SERPL-SCNC: 3.4 MMOL/L (ref 3.5–5)
POTASSIUM SERPL-SCNC: 4.8 MMOL/L (ref 3.5–5)
POTASSIUM SERPL-SCNC: 4.9 MMOL/L (ref 3.5–5)
POTASSIUM, UR: 15.5 MMOL/L
POTASSIUM, UR: 26.9 MMOL/L
PROCALCITONIN: 0.41 NG/ML (ref 0–0.08)
PROPOXYPHENE SCREEN: NOT DETECTED
RBC # BLD: 2.18 E12/L (ref 3.5–5.5)
RBC # BLD: 2.93 E12/L (ref 3.5–5.5)
SODIUM BLD-SCNC: 135 MMOL/L (ref 132–146)
SODIUM BLD-SCNC: 137 MMOL/L (ref 132–146)
SODIUM BLD-SCNC: 139 MMOL/L (ref 132–146)
SODIUM BLD-SCNC: 143 MMOL/L (ref 132–146)
SODIUM URINE: 89 MMOL/L
SODIUM URINE: 90 MMOL/L
TOTAL IRON BINDING CAPACITY: 154 MCG/DL (ref 250–450)
TOTAL PROTEIN: 5.3 G/DL (ref 6.4–8.3)
VITAMIN B-12: 1562 PG/ML (ref 211–946)
WBC # BLD: 11.7 E9/L (ref 4.5–11.5)
WBC # BLD: 11.9 E9/L (ref 4.5–11.5)

## 2019-10-02 PROCEDURE — 86900 BLOOD TYPING SEROLOGIC ABO: CPT

## 2019-10-02 PROCEDURE — C9113 INJ PANTOPRAZOLE SODIUM, VIA: HCPCS | Performed by: INTERNAL MEDICINE

## 2019-10-02 PROCEDURE — 1200000000 HC SEMI PRIVATE

## 2019-10-02 PROCEDURE — 82728 ASSAY OF FERRITIN: CPT

## 2019-10-02 PROCEDURE — 82607 VITAMIN B-12: CPT

## 2019-10-02 PROCEDURE — 36592 COLLECT BLOOD FROM PICC: CPT

## 2019-10-02 PROCEDURE — 6360000002 HC RX W HCPCS: Performed by: INTERNAL MEDICINE

## 2019-10-02 PROCEDURE — 6370000000 HC RX 637 (ALT 250 FOR IP): Performed by: INTERNAL MEDICINE

## 2019-10-02 PROCEDURE — 36430 TRANSFUSION BLD/BLD COMPNT: CPT

## 2019-10-02 PROCEDURE — 87081 CULTURE SCREEN ONLY: CPT

## 2019-10-02 PROCEDURE — 84300 ASSAY OF URINE SODIUM: CPT

## 2019-10-02 PROCEDURE — 84133 ASSAY OF URINE POTASSIUM: CPT

## 2019-10-02 PROCEDURE — 83550 IRON BINDING TEST: CPT

## 2019-10-02 PROCEDURE — 80048 BASIC METABOLIC PNL TOTAL CA: CPT

## 2019-10-02 PROCEDURE — 83735 ASSAY OF MAGNESIUM: CPT

## 2019-10-02 PROCEDURE — 36415 COLL VENOUS BLD VENIPUNCTURE: CPT

## 2019-10-02 PROCEDURE — 83036 HEMOGLOBIN GLYCOSYLATED A1C: CPT

## 2019-10-02 PROCEDURE — 2580000003 HC RX 258: Performed by: STUDENT IN AN ORGANIZED HEALTH CARE EDUCATION/TRAINING PROGRAM

## 2019-10-02 PROCEDURE — 82962 GLUCOSE BLOOD TEST: CPT

## 2019-10-02 PROCEDURE — 82570 ASSAY OF URINE CREATININE: CPT

## 2019-10-02 PROCEDURE — 6360000002 HC RX W HCPCS: Performed by: STUDENT IN AN ORGANIZED HEALTH CARE EDUCATION/TRAINING PROGRAM

## 2019-10-02 PROCEDURE — 83605 ASSAY OF LACTIC ACID: CPT

## 2019-10-02 PROCEDURE — 36591 DRAW BLOOD OFF VENOUS DEVICE: CPT

## 2019-10-02 PROCEDURE — 86923 COMPATIBILITY TEST ELECTRIC: CPT

## 2019-10-02 PROCEDURE — 80076 HEPATIC FUNCTION PANEL: CPT

## 2019-10-02 PROCEDURE — 82436 ASSAY OF URINE CHLORIDE: CPT

## 2019-10-02 PROCEDURE — 2580000003 HC RX 258: Performed by: INTERNAL MEDICINE

## 2019-10-02 PROCEDURE — 86850 RBC ANTIBODY SCREEN: CPT

## 2019-10-02 PROCEDURE — 84100 ASSAY OF PHOSPHORUS: CPT

## 2019-10-02 PROCEDURE — P9016 RBC LEUKOCYTES REDUCED: HCPCS

## 2019-10-02 PROCEDURE — 85025 COMPLETE CBC W/AUTO DIFF WBC: CPT

## 2019-10-02 PROCEDURE — 86140 C-REACTIVE PROTEIN: CPT

## 2019-10-02 PROCEDURE — 76770 US EXAM ABDO BACK WALL COMP: CPT

## 2019-10-02 PROCEDURE — 83540 ASSAY OF IRON: CPT

## 2019-10-02 PROCEDURE — 82746 ASSAY OF FOLIC ACID SERUM: CPT

## 2019-10-02 PROCEDURE — 2500000003 HC RX 250 WO HCPCS: Performed by: STUDENT IN AN ORGANIZED HEALTH CARE EDUCATION/TRAINING PROGRAM

## 2019-10-02 PROCEDURE — 99233 SBSQ HOSP IP/OBS HIGH 50: CPT | Performed by: INTERNAL MEDICINE

## 2019-10-02 PROCEDURE — 86901 BLOOD TYPING SEROLOGIC RH(D): CPT

## 2019-10-02 PROCEDURE — 87040 BLOOD CULTURE FOR BACTERIA: CPT

## 2019-10-02 PROCEDURE — 84145 PROCALCITONIN (PCT): CPT

## 2019-10-02 RX ORDER — DEXTROSE MONOHYDRATE 25 G/50ML
12.5 INJECTION, SOLUTION INTRAVENOUS PRN
Status: DISCONTINUED | OUTPATIENT
Start: 2019-10-02 | End: 2019-10-17 | Stop reason: HOSPADM

## 2019-10-02 RX ORDER — 0.9 % SODIUM CHLORIDE 0.9 %
250 INTRAVENOUS SOLUTION INTRAVENOUS ONCE
Status: COMPLETED | OUTPATIENT
Start: 2019-10-02 | End: 2019-10-02

## 2019-10-02 RX ORDER — 0.9 % SODIUM CHLORIDE 0.9 %
10 VIAL (ML) INJECTION 2 TIMES DAILY
Status: DISCONTINUED | OUTPATIENT
Start: 2019-10-02 | End: 2019-10-17 | Stop reason: HOSPADM

## 2019-10-02 RX ORDER — NICOTINE POLACRILEX 4 MG
15 LOZENGE BUCCAL PRN
Status: DISCONTINUED | OUTPATIENT
Start: 2019-10-02 | End: 2019-10-17 | Stop reason: HOSPADM

## 2019-10-02 RX ORDER — PANTOPRAZOLE SODIUM 40 MG/10ML
40 INJECTION, POWDER, LYOPHILIZED, FOR SOLUTION INTRAVENOUS DAILY
Status: DISCONTINUED | OUTPATIENT
Start: 2019-10-02 | End: 2019-10-08

## 2019-10-02 RX ORDER — LISINOPRIL 5 MG/1
5 TABLET ORAL DAILY
Status: DISCONTINUED | OUTPATIENT
Start: 2019-10-02 | End: 2019-10-03

## 2019-10-02 RX ORDER — DEXTROSE MONOHYDRATE 50 MG/ML
100 INJECTION, SOLUTION INTRAVENOUS PRN
Status: DISCONTINUED | OUTPATIENT
Start: 2019-10-02 | End: 2019-10-17 | Stop reason: HOSPADM

## 2019-10-02 RX ORDER — HYDROXYZINE PAMOATE 25 MG/1
25 CAPSULE ORAL EVERY 8 HOURS PRN
Status: DISCONTINUED | OUTPATIENT
Start: 2019-10-02 | End: 2019-10-17 | Stop reason: HOSPADM

## 2019-10-02 RX ORDER — INSULIN GLARGINE 100 [IU]/ML
24 INJECTION, SOLUTION SUBCUTANEOUS NIGHTLY
Status: DISCONTINUED | OUTPATIENT
Start: 2019-10-02 | End: 2019-10-08

## 2019-10-02 RX ORDER — HYDRALAZINE HYDROCHLORIDE 20 MG/ML
10 INJECTION INTRAMUSCULAR; INTRAVENOUS EVERY 6 HOURS PRN
Status: DISCONTINUED | OUTPATIENT
Start: 2019-10-02 | End: 2019-10-03

## 2019-10-02 RX ADMIN — POTASSIUM CHLORIDE 10 MEQ: 7.46 INJECTION, SOLUTION INTRAVENOUS at 02:43

## 2019-10-02 RX ADMIN — MAGNESIUM SULFATE IN DEXTROSE 1 G: 10 INJECTION, SOLUTION INTRAVENOUS at 06:23

## 2019-10-02 RX ADMIN — POTASSIUM CHLORIDE 10 MEQ: 7.46 INJECTION, SOLUTION INTRAVENOUS at 07:40

## 2019-10-02 RX ADMIN — POTASSIUM CHLORIDE 10 MEQ: 7.46 INJECTION, SOLUTION INTRAVENOUS at 11:07

## 2019-10-02 RX ADMIN — POTASSIUM CHLORIDE 10 MEQ: 7.46 INJECTION, SOLUTION INTRAVENOUS at 06:21

## 2019-10-02 RX ADMIN — INSULIN LISPRO 9 UNITS: 100 INJECTION, SOLUTION INTRAVENOUS; SUBCUTANEOUS at 11:20

## 2019-10-02 RX ADMIN — DEXTROSE AND SODIUM CHLORIDE: 5; 450 INJECTION, SOLUTION INTRAVENOUS at 09:08

## 2019-10-02 RX ADMIN — MAGNESIUM SULFATE IN DEXTROSE 1 G: 10 INJECTION, SOLUTION INTRAVENOUS at 07:42

## 2019-10-02 RX ADMIN — ENOXAPARIN SODIUM 30 MG: 30 INJECTION SUBCUTANEOUS at 10:05

## 2019-10-02 RX ADMIN — Medication 10 ML: at 08:59

## 2019-10-02 RX ADMIN — INSULIN LISPRO 9 UNITS: 100 INJECTION, SOLUTION INTRAVENOUS; SUBCUTANEOUS at 16:55

## 2019-10-02 RX ADMIN — POTASSIUM CHLORIDE 10 MEQ: 7.46 INJECTION, SOLUTION INTRAVENOUS at 03:15

## 2019-10-02 RX ADMIN — POTASSIUM CHLORIDE 10 MEQ: 7.46 INJECTION, SOLUTION INTRAVENOUS at 02:03

## 2019-10-02 RX ADMIN — SODIUM PHOSPHATE, MONOBASIC, MONOHYDRATE 10 MMOL: 276; 142 INJECTION, SOLUTION INTRAVENOUS at 02:31

## 2019-10-02 RX ADMIN — POTASSIUM CHLORIDE 10 MEQ: 7.46 INJECTION, SOLUTION INTRAVENOUS at 07:00

## 2019-10-02 RX ADMIN — LISINOPRIL 5 MG: 5 TABLET ORAL at 13:13

## 2019-10-02 RX ADMIN — INSULIN GLARGINE 24 UNITS: 100 INJECTION, SOLUTION SUBCUTANEOUS at 20:21

## 2019-10-02 RX ADMIN — PANTOPRAZOLE SODIUM 40 MG: 40 INJECTION, POWDER, FOR SOLUTION INTRAVENOUS at 01:07

## 2019-10-02 RX ADMIN — DEXTROSE AND SODIUM CHLORIDE: 5; 450 INJECTION, SOLUTION INTRAVENOUS at 01:58

## 2019-10-02 RX ADMIN — INSULIN LISPRO 2 UNITS: 100 INJECTION, SOLUTION INTRAVENOUS; SUBCUTANEOUS at 20:20

## 2019-10-02 RX ADMIN — HYDROXYZINE PAMOATE 25 MG: 25 CAPSULE ORAL at 01:16

## 2019-10-02 RX ADMIN — ACETAMINOPHEN 650 MG: 325 TABLET, FILM COATED ORAL at 02:26

## 2019-10-02 RX ADMIN — Medication 10 ML: at 20:19

## 2019-10-02 RX ADMIN — DEXTROSE AND SODIUM CHLORIDE: 5; 450 INJECTION, SOLUTION INTRAVENOUS at 17:34

## 2019-10-02 RX ADMIN — POTASSIUM CHLORIDE 10 MEQ: 7.46 INJECTION, SOLUTION INTRAVENOUS at 10:01

## 2019-10-02 RX ADMIN — ONDANSETRON 4 MG: 2 INJECTION INTRAMUSCULAR; INTRAVENOUS at 13:17

## 2019-10-02 RX ADMIN — SODIUM CHLORIDE 250 ML: 9 INJECTION, SOLUTION INTRAVENOUS at 12:33

## 2019-10-02 ASSESSMENT — ENCOUNTER SYMPTOMS
NAUSEA: 1
EYES NEGATIVE: 1
ABDOMINAL PAIN: 1
RESPIRATORY NEGATIVE: 1
ALLERGIC/IMMUNOLOGIC NEGATIVE: 1

## 2019-10-02 ASSESSMENT — PAIN DESCRIPTION - PAIN TYPE
TYPE: ACUTE PAIN

## 2019-10-02 ASSESSMENT — PAIN - FUNCTIONAL ASSESSMENT: PAIN_FUNCTIONAL_ASSESSMENT: ACTIVITIES ARE NOT PREVENTED

## 2019-10-02 ASSESSMENT — PAIN DESCRIPTION - ONSET: ONSET: ON-GOING

## 2019-10-02 ASSESSMENT — PAIN DESCRIPTION - DESCRIPTORS
DESCRIPTORS: ACHING;DISCOMFORT
DESCRIPTORS: ACHING;DISCOMFORT
DESCRIPTORS: ACHING;DISCOMFORT;SORE

## 2019-10-02 ASSESSMENT — PAIN DESCRIPTION - PROGRESSION: CLINICAL_PROGRESSION: GRADUALLY WORSENING

## 2019-10-02 ASSESSMENT — PAIN SCALES - GENERAL
PAINLEVEL_OUTOF10: 10
PAINLEVEL_OUTOF10: 0
PAINLEVEL_OUTOF10: 6
PAINLEVEL_OUTOF10: 8
PAINLEVEL_OUTOF10: 6

## 2019-10-02 ASSESSMENT — PAIN DESCRIPTION - FREQUENCY: FREQUENCY: INTERMITTENT

## 2019-10-02 ASSESSMENT — PAIN DESCRIPTION - LOCATION
LOCATION: GENERALIZED
LOCATION: GENERALIZED
LOCATION: ABDOMEN;FLANK
LOCATION: GENERALIZED

## 2019-10-02 ASSESSMENT — PAIN DESCRIPTION - ORIENTATION: ORIENTATION: LOWER

## 2019-10-03 ENCOUNTER — APPOINTMENT (OUTPATIENT)
Dept: GENERAL RADIOLOGY | Age: 29
DRG: 420 | End: 2019-10-03
Payer: COMMERCIAL

## 2019-10-03 LAB
ALBUMIN SERPL-MCNC: 2.5 G/DL (ref 3.5–5.2)
ALP BLD-CCNC: 99 U/L (ref 35–104)
ALT SERPL-CCNC: 7 U/L (ref 0–32)
ANION GAP SERPL CALCULATED.3IONS-SCNC: 4 MMOL/L (ref 7–16)
AST SERPL-CCNC: 11 U/L (ref 0–31)
BASOPHILS ABSOLUTE: 0.08 E9/L (ref 0–0.2)
BASOPHILS RELATIVE PERCENT: 0.9 % (ref 0–2)
BILIRUB SERPL-MCNC: <0.2 MG/DL (ref 0–1.2)
BILIRUBIN DIRECT: <0.2 MG/DL (ref 0–0.3)
BILIRUBIN, INDIRECT: ABNORMAL MG/DL (ref 0–1)
BUN BLDV-MCNC: 31 MG/DL (ref 6–20)
CALCIUM SERPL-MCNC: 7.9 MG/DL (ref 8.6–10.2)
CHLORIDE BLD-SCNC: 110 MMOL/L (ref 98–107)
CO2: 20 MMOL/L (ref 22–29)
CREAT SERPL-MCNC: 2.8 MG/DL (ref 0.5–1)
EKG ATRIAL RATE: 114 BPM
EKG P AXIS: 32 DEGREES
EKG P-R INTERVAL: 118 MS
EKG Q-T INTERVAL: 346 MS
EKG QRS DURATION: 76 MS
EKG QTC CALCULATION (BAZETT): 476 MS
EKG R AXIS: 32 DEGREES
EKG T AXIS: 26 DEGREES
EKG VENTRICULAR RATE: 114 BPM
EOSINOPHILS ABSOLUTE: 0.48 E9/L (ref 0.05–0.5)
EOSINOPHILS RELATIVE PERCENT: 5.5 % (ref 0–6)
GFR AFRICAN AMERICAN: 24
GFR NON-AFRICAN AMERICAN: 24 ML/MIN/1.73
GLUCOSE BLD-MCNC: 273 MG/DL (ref 74–99)
HCT VFR BLD CALC: 21.4 % (ref 34–48)
HEMOGLOBIN: 7.3 G/DL (ref 11.5–15.5)
IMMATURE GRANULOCYTES #: 0.03 E9/L
IMMATURE GRANULOCYTES %: 0.3 % (ref 0–5)
LYMPHOCYTES ABSOLUTE: 2.4 E9/L (ref 1.5–4)
LYMPHOCYTES RELATIVE PERCENT: 27.3 % (ref 20–42)
MAGNESIUM: 1.9 MG/DL (ref 1.6–2.6)
MCH RBC QN AUTO: 29.8 PG (ref 26–35)
MCHC RBC AUTO-ENTMCNC: 34.1 % (ref 32–34.5)
MCV RBC AUTO: 87.3 FL (ref 80–99.9)
METER GLUCOSE: 142 MG/DL (ref 74–99)
METER GLUCOSE: 181 MG/DL (ref 74–99)
METER GLUCOSE: 277 MG/DL (ref 74–99)
METER GLUCOSE: 277 MG/DL (ref 74–99)
METER GLUCOSE: 57 MG/DL (ref 74–99)
MONOCYTES ABSOLUTE: 0.38 E9/L (ref 0.1–0.95)
MONOCYTES RELATIVE PERCENT: 4.3 % (ref 2–12)
MRSA CULTURE ONLY: NORMAL
NEUTROPHILS ABSOLUTE: 5.43 E9/L (ref 1.8–7.3)
NEUTROPHILS RELATIVE PERCENT: 61.7 % (ref 43–80)
PDW BLD-RTO: 14.6 FL (ref 11.5–15)
PHOSPHORUS: 2.7 MG/DL (ref 2.5–4.5)
PLATELET # BLD: 227 E9/L (ref 130–450)
PMV BLD AUTO: 10.1 FL (ref 7–12)
POTASSIUM SERPL-SCNC: 4.4 MMOL/L (ref 3.5–5)
RBC # BLD: 2.45 E12/L (ref 3.5–5.5)
SODIUM BLD-SCNC: 134 MMOL/L (ref 132–146)
TOTAL PROTEIN: 5 G/DL (ref 6.4–8.3)
WBC # BLD: 8.8 E9/L (ref 4.5–11.5)

## 2019-10-03 PROCEDURE — 2580000003 HC RX 258: Performed by: INTERNAL MEDICINE

## 2019-10-03 PROCEDURE — 6370000000 HC RX 637 (ALT 250 FOR IP): Performed by: INTERNAL MEDICINE

## 2019-10-03 PROCEDURE — 36415 COLL VENOUS BLD VENIPUNCTURE: CPT

## 2019-10-03 PROCEDURE — 6360000002 HC RX W HCPCS: Performed by: INTERNAL MEDICINE

## 2019-10-03 PROCEDURE — C9113 INJ PANTOPRAZOLE SODIUM, VIA: HCPCS | Performed by: INTERNAL MEDICINE

## 2019-10-03 PROCEDURE — 93010 ELECTROCARDIOGRAM REPORT: CPT | Performed by: INTERNAL MEDICINE

## 2019-10-03 PROCEDURE — 74018 RADEX ABDOMEN 1 VIEW: CPT

## 2019-10-03 PROCEDURE — 1200000000 HC SEMI PRIVATE

## 2019-10-03 PROCEDURE — 2580000003 HC RX 258: Performed by: STUDENT IN AN ORGANIZED HEALTH CARE EDUCATION/TRAINING PROGRAM

## 2019-10-03 PROCEDURE — 99233 SBSQ HOSP IP/OBS HIGH 50: CPT | Performed by: INTERNAL MEDICINE

## 2019-10-03 PROCEDURE — 85025 COMPLETE CBC W/AUTO DIFF WBC: CPT

## 2019-10-03 PROCEDURE — 83735 ASSAY OF MAGNESIUM: CPT

## 2019-10-03 PROCEDURE — 84100 ASSAY OF PHOSPHORUS: CPT

## 2019-10-03 PROCEDURE — 80076 HEPATIC FUNCTION PANEL: CPT

## 2019-10-03 PROCEDURE — 80048 BASIC METABOLIC PNL TOTAL CA: CPT

## 2019-10-03 PROCEDURE — 82962 GLUCOSE BLOOD TEST: CPT

## 2019-10-03 RX ORDER — HYDROCODONE BITARTRATE AND ACETAMINOPHEN 5; 325 MG/1; MG/1
1 TABLET ORAL ONCE
Status: DISCONTINUED | OUTPATIENT
Start: 2019-10-03 | End: 2019-10-03

## 2019-10-03 RX ORDER — HYDRALAZINE HYDROCHLORIDE 25 MG/1
25 TABLET, FILM COATED ORAL EVERY 8 HOURS SCHEDULED
Status: DISCONTINUED | OUTPATIENT
Start: 2019-10-03 | End: 2019-10-04

## 2019-10-03 RX ORDER — HYDRALAZINE HYDROCHLORIDE 20 MG/ML
20 INJECTION INTRAMUSCULAR; INTRAVENOUS EVERY 6 HOURS PRN
Status: DISCONTINUED | OUTPATIENT
Start: 2019-10-03 | End: 2019-10-08

## 2019-10-03 RX ORDER — SODIUM CHLORIDE 9 MG/ML
INJECTION, SOLUTION INTRAVENOUS CONTINUOUS
Status: DISCONTINUED | OUTPATIENT
Start: 2019-10-03 | End: 2019-10-05

## 2019-10-03 RX ADMIN — HYDRALAZINE HYDROCHLORIDE 25 MG: 25 TABLET, FILM COATED ORAL at 22:00

## 2019-10-03 RX ADMIN — ONDANSETRON 4 MG: 2 INJECTION INTRAMUSCULAR; INTRAVENOUS at 18:27

## 2019-10-03 RX ADMIN — Medication 10 ML: at 20:52

## 2019-10-03 RX ADMIN — INSULIN GLARGINE 24 UNITS: 100 INJECTION, SOLUTION SUBCUTANEOUS at 21:05

## 2019-10-03 RX ADMIN — HYDRALAZINE HYDROCHLORIDE 10 MG: 20 INJECTION INTRAMUSCULAR; INTRAVENOUS at 08:41

## 2019-10-03 RX ADMIN — ACETAMINOPHEN 650 MG: 325 TABLET, FILM COATED ORAL at 16:46

## 2019-10-03 RX ADMIN — INSULIN LISPRO 3 UNITS: 100 INJECTION, SOLUTION INTRAVENOUS; SUBCUTANEOUS at 12:07

## 2019-10-03 RX ADMIN — HYDROXYZINE PAMOATE 25 MG: 25 CAPSULE ORAL at 16:46

## 2019-10-03 RX ADMIN — INSULIN LISPRO 3 UNITS: 100 INJECTION, SOLUTION INTRAVENOUS; SUBCUTANEOUS at 12:05

## 2019-10-03 RX ADMIN — PANTOPRAZOLE SODIUM 40 MG: 40 INJECTION, POWDER, FOR SOLUTION INTRAVENOUS at 08:41

## 2019-10-03 RX ADMIN — INSULIN LISPRO 5 UNITS: 100 INJECTION, SOLUTION INTRAVENOUS; SUBCUTANEOUS at 21:06

## 2019-10-03 RX ADMIN — Medication 10 ML: at 08:41

## 2019-10-03 RX ADMIN — HYDRALAZINE HYDROCHLORIDE 20 MG: 20 INJECTION INTRAMUSCULAR; INTRAVENOUS at 12:11

## 2019-10-03 RX ADMIN — LISINOPRIL 5 MG: 5 TABLET ORAL at 08:41

## 2019-10-03 RX ADMIN — INSULIN LISPRO 9 UNITS: 100 INJECTION, SOLUTION INTRAVENOUS; SUBCUTANEOUS at 08:38

## 2019-10-03 RX ADMIN — SODIUM CHLORIDE: 9 INJECTION, SOLUTION INTRAVENOUS at 08:46

## 2019-10-03 ASSESSMENT — PAIN SCALES - GENERAL
PAINLEVEL_OUTOF10: 3
PAINLEVEL_OUTOF10: 5
PAINLEVEL_OUTOF10: 0
PAINLEVEL_OUTOF10: 8

## 2019-10-03 ASSESSMENT — PAIN DESCRIPTION - LOCATION: LOCATION: ABDOMEN;FLANK

## 2019-10-03 ASSESSMENT — PAIN DESCRIPTION - DESCRIPTORS: DESCRIPTORS: DISCOMFORT

## 2019-10-03 ASSESSMENT — PAIN DESCRIPTION - ORIENTATION: ORIENTATION: LOWER

## 2019-10-03 ASSESSMENT — PAIN DESCRIPTION - PAIN TYPE: TYPE: ACUTE PAIN

## 2019-10-04 LAB
ALBUMIN SERPL-MCNC: 2.4 G/DL (ref 3.5–5.2)
ALP BLD-CCNC: 94 U/L (ref 35–104)
ALT SERPL-CCNC: 7 U/L (ref 0–32)
ANION GAP SERPL CALCULATED.3IONS-SCNC: 4 MMOL/L (ref 7–16)
AST SERPL-CCNC: 14 U/L (ref 0–31)
BASOPHILS ABSOLUTE: 0.08 E9/L (ref 0–0.2)
BASOPHILS RELATIVE PERCENT: 1.3 % (ref 0–2)
BILIRUB SERPL-MCNC: <0.2 MG/DL (ref 0–1.2)
BILIRUBIN DIRECT: <0.2 MG/DL (ref 0–0.3)
BILIRUBIN, INDIRECT: ABNORMAL MG/DL (ref 0–1)
BUN BLDV-MCNC: 29 MG/DL (ref 6–20)
CALCIUM SERPL-MCNC: 7.7 MG/DL (ref 8.6–10.2)
CHLORIDE BLD-SCNC: 118 MMOL/L (ref 98–107)
CO2: 20 MMOL/L (ref 22–29)
CREAT SERPL-MCNC: 2.3 MG/DL (ref 0.5–1)
CREATININE URINE: 48 MG/DL (ref 29–226)
EOSINOPHILS ABSOLUTE: 0.58 E9/L (ref 0.05–0.5)
EOSINOPHILS RELATIVE PERCENT: 9.1 % (ref 0–6)
GFR AFRICAN AMERICAN: 31
GFR NON-AFRICAN AMERICAN: 31 ML/MIN/1.73
GLUCOSE BLD-MCNC: 65 MG/DL (ref 74–99)
HCT VFR BLD CALC: 22.2 % (ref 34–48)
HEMOGLOBIN: 7.1 G/DL (ref 11.5–15.5)
IMMATURE GRANULOCYTES #: 0.01 E9/L
IMMATURE GRANULOCYTES %: 0.2 % (ref 0–5)
LYMPHOCYTES ABSOLUTE: 2.11 E9/L (ref 1.5–4)
LYMPHOCYTES RELATIVE PERCENT: 33 % (ref 20–42)
MAGNESIUM: 1.7 MG/DL (ref 1.6–2.6)
MCH RBC QN AUTO: 28.7 PG (ref 26–35)
MCHC RBC AUTO-ENTMCNC: 32 % (ref 32–34.5)
MCV RBC AUTO: 89.9 FL (ref 80–99.9)
METER GLUCOSE: 129 MG/DL (ref 74–99)
METER GLUCOSE: 132 MG/DL (ref 74–99)
METER GLUCOSE: 155 MG/DL (ref 74–99)
METER GLUCOSE: 48 MG/DL (ref 74–99)
METER GLUCOSE: 54 MG/DL (ref 74–99)
METER GLUCOSE: 66 MG/DL (ref 74–99)
METER GLUCOSE: 79 MG/DL (ref 74–99)
METER GLUCOSE: 93 MG/DL (ref 74–99)
MONOCYTES ABSOLUTE: 0.37 E9/L (ref 0.1–0.95)
MONOCYTES RELATIVE PERCENT: 5.8 % (ref 2–12)
NEUTROPHILS ABSOLUTE: 3.25 E9/L (ref 1.8–7.3)
NEUTROPHILS RELATIVE PERCENT: 50.6 % (ref 43–80)
PDW BLD-RTO: 15.3 FL (ref 11.5–15)
PLATELET # BLD: 228 E9/L (ref 130–450)
PMV BLD AUTO: 10.5 FL (ref 7–12)
POTASSIUM REFLEX MAGNESIUM: 4.2 MMOL/L (ref 3.5–5)
PROTEIN PROTEIN: 253 MG/DL (ref 0–12)
PROTEIN/CREAT RATIO: 5.3
PROTEIN/CREAT RATIO: 5.3 (ref 0–0.2)
RBC # BLD: 2.47 E12/L (ref 3.5–5.5)
SODIUM BLD-SCNC: 142 MMOL/L (ref 132–146)
TOTAL PROTEIN: 4.9 G/DL (ref 6.4–8.3)
WBC # BLD: 6.4 E9/L (ref 4.5–11.5)

## 2019-10-04 PROCEDURE — 2580000003 HC RX 258: Performed by: INTERNAL MEDICINE

## 2019-10-04 PROCEDURE — 6370000000 HC RX 637 (ALT 250 FOR IP): Performed by: INTERNAL MEDICINE

## 2019-10-04 PROCEDURE — 83735 ASSAY OF MAGNESIUM: CPT

## 2019-10-04 PROCEDURE — 82570 ASSAY OF URINE CREATININE: CPT

## 2019-10-04 PROCEDURE — 36415 COLL VENOUS BLD VENIPUNCTURE: CPT

## 2019-10-04 PROCEDURE — 82962 GLUCOSE BLOOD TEST: CPT

## 2019-10-04 PROCEDURE — 6360000002 HC RX W HCPCS: Performed by: INTERNAL MEDICINE

## 2019-10-04 PROCEDURE — 84156 ASSAY OF PROTEIN URINE: CPT

## 2019-10-04 PROCEDURE — 80076 HEPATIC FUNCTION PANEL: CPT

## 2019-10-04 PROCEDURE — 82044 UR ALBUMIN SEMIQUANTITATIVE: CPT

## 2019-10-04 PROCEDURE — C9113 INJ PANTOPRAZOLE SODIUM, VIA: HCPCS | Performed by: INTERNAL MEDICINE

## 2019-10-04 PROCEDURE — 6360000002 HC RX W HCPCS

## 2019-10-04 PROCEDURE — 85025 COMPLETE CBC W/AUTO DIFF WBC: CPT

## 2019-10-04 PROCEDURE — 80048 BASIC METABOLIC PNL TOTAL CA: CPT

## 2019-10-04 PROCEDURE — 99233 SBSQ HOSP IP/OBS HIGH 50: CPT | Performed by: INTERNAL MEDICINE

## 2019-10-04 PROCEDURE — 1200000000 HC SEMI PRIVATE

## 2019-10-04 RX ORDER — TRAMADOL HYDROCHLORIDE 50 MG/1
50 TABLET ORAL EVERY 6 HOURS PRN
Status: DISCONTINUED | OUTPATIENT
Start: 2019-10-04 | End: 2019-10-08

## 2019-10-04 RX ORDER — HYDRALAZINE HYDROCHLORIDE 50 MG/1
50 TABLET, FILM COATED ORAL EVERY 8 HOURS SCHEDULED
Status: DISCONTINUED | OUTPATIENT
Start: 2019-10-04 | End: 2019-10-07

## 2019-10-04 RX ORDER — HEPARIN SODIUM (PORCINE) LOCK FLUSH IV SOLN 100 UNIT/ML 100 UNIT/ML
SOLUTION INTRAVENOUS
Status: COMPLETED
Start: 2019-10-04 | End: 2019-10-04

## 2019-10-04 RX ORDER — ACETAMINOPHEN 325 MG/1
650 TABLET ORAL EVERY 4 HOURS PRN
Status: DISCONTINUED | OUTPATIENT
Start: 2019-10-04 | End: 2019-10-17 | Stop reason: HOSPADM

## 2019-10-04 RX ORDER — DICYCLOMINE HYDROCHLORIDE 10 MG/1
10 CAPSULE ORAL
Status: DISCONTINUED | OUTPATIENT
Start: 2019-10-04 | End: 2019-10-07

## 2019-10-04 RX ADMIN — DEXTROSE AND SODIUM CHLORIDE: 5; 450 INJECTION, SOLUTION INTRAVENOUS at 11:14

## 2019-10-04 RX ADMIN — Medication 10 ML: at 08:06

## 2019-10-04 RX ADMIN — INSULIN LISPRO 3 UNITS: 100 INJECTION, SOLUTION INTRAVENOUS; SUBCUTANEOUS at 08:06

## 2019-10-04 RX ADMIN — INSULIN GLARGINE 24 UNITS: 100 INJECTION, SOLUTION SUBCUTANEOUS at 21:33

## 2019-10-04 RX ADMIN — HYDRALAZINE HYDROCHLORIDE 20 MG: 20 INJECTION INTRAMUSCULAR; INTRAVENOUS at 08:56

## 2019-10-04 RX ADMIN — HYDROXYZINE PAMOATE 25 MG: 25 CAPSULE ORAL at 08:59

## 2019-10-04 RX ADMIN — SODIUM CHLORIDE: 9 INJECTION, SOLUTION INTRAVENOUS at 02:44

## 2019-10-04 RX ADMIN — DICYCLOMINE HYDROCHLORIDE 10 MG: 10 CAPSULE ORAL at 16:10

## 2019-10-04 RX ADMIN — HYDRALAZINE HYDROCHLORIDE 25 MG: 25 TABLET, FILM COATED ORAL at 05:06

## 2019-10-04 RX ADMIN — DARBEPOETIN ALFA 40 MCG: 40 INJECTION, SOLUTION INTRAVENOUS; SUBCUTANEOUS at 19:34

## 2019-10-04 RX ADMIN — HYDRALAZINE HYDROCHLORIDE 25 MG: 25 TABLET, FILM COATED ORAL at 15:00

## 2019-10-04 RX ADMIN — SODIUM CHLORIDE 100 MG: 9 INJECTION, SOLUTION INTRAVENOUS at 19:54

## 2019-10-04 RX ADMIN — SODIUM CHLORIDE 25 MG: 9 INJECTION, SOLUTION INTRAVENOUS at 17:42

## 2019-10-04 RX ADMIN — ACETAMINOPHEN 650 MG: 325 TABLET, FILM COATED ORAL at 08:59

## 2019-10-04 RX ADMIN — Medication: at 05:23

## 2019-10-04 RX ADMIN — PANTOPRAZOLE SODIUM 40 MG: 40 INJECTION, POWDER, FOR SOLUTION INTRAVENOUS at 08:06

## 2019-10-04 RX ADMIN — HYDRALAZINE HYDROCHLORIDE 20 MG: 20 INJECTION INTRAMUSCULAR; INTRAVENOUS at 19:34

## 2019-10-04 RX ADMIN — HYDRALAZINE HYDROCHLORIDE 50 MG: 50 TABLET ORAL at 21:33

## 2019-10-04 RX ADMIN — TRAMADOL HYDROCHLORIDE 50 MG: 50 TABLET, FILM COATED ORAL at 17:42

## 2019-10-04 ASSESSMENT — PAIN DESCRIPTION - PAIN TYPE
TYPE: ACUTE PAIN
TYPE: ACUTE PAIN

## 2019-10-04 ASSESSMENT — PAIN DESCRIPTION - LOCATION
LOCATION: BACK
LOCATION: BACK;HEAD

## 2019-10-04 ASSESSMENT — PAIN SCALES - GENERAL
PAINLEVEL_OUTOF10: 4
PAINLEVEL_OUTOF10: 8
PAINLEVEL_OUTOF10: 5
PAINLEVEL_OUTOF10: 6

## 2019-10-04 ASSESSMENT — PAIN DESCRIPTION - DESCRIPTORS
DESCRIPTORS: HEADACHE;ACHING;SORE
DESCRIPTORS: DISCOMFORT

## 2019-10-04 ASSESSMENT — PAIN DESCRIPTION - ORIENTATION: ORIENTATION: LOWER

## 2019-10-05 LAB
ANION GAP SERPL CALCULATED.3IONS-SCNC: 5 MMOL/L (ref 7–16)
ANION GAP SERPL CALCULATED.3IONS-SCNC: 9 MMOL/L (ref 7–16)
B.E.: -6.9 MMOL/L (ref -3–3)
BUN BLDV-MCNC: 24 MG/DL (ref 6–20)
BUN BLDV-MCNC: 24 MG/DL (ref 6–20)
CALCIUM SERPL-MCNC: 7.5 MG/DL (ref 8.6–10.2)
CALCIUM SERPL-MCNC: 8.1 MG/DL (ref 8.6–10.2)
CHLORIDE BLD-SCNC: 111 MMOL/L (ref 98–107)
CHLORIDE BLD-SCNC: 111 MMOL/L (ref 98–107)
CO2: 20 MMOL/L (ref 22–29)
CO2: 21 MMOL/L (ref 22–29)
COHB: 0.3 % (ref 0–1.5)
COMMENT: ABNORMAL
CREAT SERPL-MCNC: 2.1 MG/DL (ref 0.5–1)
CREAT SERPL-MCNC: 2.1 MG/DL (ref 0.5–1)
CRITICAL: ABNORMAL
DATE ANALYZED: ABNORMAL
DATE OF COLLECTION: ABNORMAL
GFR AFRICAN AMERICAN: 34
GFR AFRICAN AMERICAN: 34
GFR NON-AFRICAN AMERICAN: 34 ML/MIN/1.73
GFR NON-AFRICAN AMERICAN: 34 ML/MIN/1.73
GLUCOSE BLD-MCNC: 80 MG/DL (ref 74–99)
GLUCOSE BLD-MCNC: 85 MG/DL (ref 74–99)
HCO3: 18.6 MMOL/L (ref 22–26)
HCT VFR BLD CALC: 23.3 % (ref 34–48)
HEMOGLOBIN: 7.4 G/DL (ref 11.5–15.5)
HHB: 3.1 % (ref 0–5)
LAB: ABNORMAL
Lab: ABNORMAL
MCH RBC QN AUTO: 29.1 PG (ref 26–35)
MCHC RBC AUTO-ENTMCNC: 31.8 % (ref 32–34.5)
MCV RBC AUTO: 91.7 FL (ref 80–99.9)
METER GLUCOSE: 119 MG/DL (ref 74–99)
METER GLUCOSE: 51 MG/DL (ref 74–99)
METER GLUCOSE: 60 MG/DL (ref 74–99)
METER GLUCOSE: 64 MG/DL (ref 74–99)
METER GLUCOSE: 71 MG/DL (ref 74–99)
METER GLUCOSE: 90 MG/DL (ref 74–99)
METER GLUCOSE: 91 MG/DL (ref 74–99)
METER GLUCOSE: <40 MG/DL (ref 74–99)
METHB: 0.3 % (ref 0–1.5)
MICROALBUMIN UR-MCNC: 1822.9 MG/L
MICROALBUMIN/CREAT UR-RTO: 3797.7 (ref 0–30)
MODE: ABNORMAL
O2 CONTENT: 12.6 ML/DL
O2 SATURATION: 96.9 % (ref 92–98.5)
O2HB: 96.3 % (ref 94–97)
OPERATOR ID: ABNORMAL
PATIENT TEMP: 37 C
PCO2: 37.4 MMHG (ref 35–45)
PDW BLD-RTO: 15.5 FL (ref 11.5–15)
PH BLOOD GAS: 7.32 (ref 7.35–7.45)
PLATELET # BLD: 234 E9/L (ref 130–450)
PMV BLD AUTO: 10.6 FL (ref 7–12)
PO2: 101 MMHG (ref 60–100)
POTASSIUM SERPL-SCNC: 4.5 MMOL/L (ref 3.5–5)
POTASSIUM SERPL-SCNC: 5.1 MMOL/L (ref 3.5–5)
RBC # BLD: 2.54 E12/L (ref 3.5–5.5)
SODIUM BLD-SCNC: 137 MMOL/L (ref 132–146)
SODIUM BLD-SCNC: 140 MMOL/L (ref 132–146)
SOURCE, BLOOD GAS: ABNORMAL
THB: 9.2 G/DL (ref 11.5–16.5)
TIME ANALYZED: 1529
WBC # BLD: 6.7 E9/L (ref 4.5–11.5)

## 2019-10-05 PROCEDURE — 2500000003 HC RX 250 WO HCPCS: Performed by: INTERNAL MEDICINE

## 2019-10-05 PROCEDURE — 1200000000 HC SEMI PRIVATE

## 2019-10-05 PROCEDURE — 82962 GLUCOSE BLOOD TEST: CPT

## 2019-10-05 PROCEDURE — 36600 WITHDRAWAL OF ARTERIAL BLOOD: CPT

## 2019-10-05 PROCEDURE — 99233 SBSQ HOSP IP/OBS HIGH 50: CPT | Performed by: INTERNAL MEDICINE

## 2019-10-05 PROCEDURE — 6360000002 HC RX W HCPCS: Performed by: INTERNAL MEDICINE

## 2019-10-05 PROCEDURE — 6370000000 HC RX 637 (ALT 250 FOR IP): Performed by: INTERNAL MEDICINE

## 2019-10-05 PROCEDURE — 80048 BASIC METABOLIC PNL TOTAL CA: CPT

## 2019-10-05 PROCEDURE — 2580000003 HC RX 258: Performed by: INTERNAL MEDICINE

## 2019-10-05 PROCEDURE — P9047 ALBUMIN (HUMAN), 25%, 50ML: HCPCS | Performed by: INTERNAL MEDICINE

## 2019-10-05 PROCEDURE — 82805 BLOOD GASES W/O2 SATURATION: CPT

## 2019-10-05 PROCEDURE — 36415 COLL VENOUS BLD VENIPUNCTURE: CPT

## 2019-10-05 PROCEDURE — 85027 COMPLETE CBC AUTOMATED: CPT

## 2019-10-05 PROCEDURE — C9113 INJ PANTOPRAZOLE SODIUM, VIA: HCPCS | Performed by: INTERNAL MEDICINE

## 2019-10-05 RX ORDER — ALBUMIN (HUMAN) 12.5 G/50ML
25 SOLUTION INTRAVENOUS EVERY 8 HOURS
Status: COMPLETED | OUTPATIENT
Start: 2019-10-05 | End: 2019-10-07

## 2019-10-05 RX ADMIN — Medication: at 18:11

## 2019-10-05 RX ADMIN — DICYCLOMINE HYDROCHLORIDE 10 MG: 10 CAPSULE ORAL at 18:11

## 2019-10-05 RX ADMIN — DICYCLOMINE HYDROCHLORIDE 10 MG: 10 CAPSULE ORAL at 11:38

## 2019-10-05 RX ADMIN — DICYCLOMINE HYDROCHLORIDE 10 MG: 10 CAPSULE ORAL at 06:10

## 2019-10-05 RX ADMIN — HYDRALAZINE HYDROCHLORIDE 50 MG: 50 TABLET ORAL at 21:59

## 2019-10-05 RX ADMIN — TRAMADOL HYDROCHLORIDE 50 MG: 50 TABLET, FILM COATED ORAL at 14:24

## 2019-10-05 RX ADMIN — SODIUM CHLORIDE 125 MG: 9 INJECTION, SOLUTION INTRAVENOUS at 12:42

## 2019-10-05 RX ADMIN — PANTOPRAZOLE SODIUM 40 MG: 40 INJECTION, POWDER, FOR SOLUTION INTRAVENOUS at 09:15

## 2019-10-05 RX ADMIN — ALBUMIN (HUMAN) 25 G: 0.25 INJECTION, SOLUTION INTRAVENOUS at 18:11

## 2019-10-05 RX ADMIN — HYDRALAZINE HYDROCHLORIDE 50 MG: 50 TABLET ORAL at 14:24

## 2019-10-05 RX ADMIN — TRAMADOL HYDROCHLORIDE 50 MG: 50 TABLET, FILM COATED ORAL at 06:57

## 2019-10-05 RX ADMIN — HYDRALAZINE HYDROCHLORIDE 50 MG: 50 TABLET ORAL at 06:09

## 2019-10-05 RX ADMIN — ONDANSETRON 4 MG: 2 INJECTION INTRAMUSCULAR; INTRAVENOUS at 09:15

## 2019-10-05 RX ADMIN — TRAMADOL HYDROCHLORIDE 50 MG: 50 TABLET, FILM COATED ORAL at 00:40

## 2019-10-05 ASSESSMENT — PAIN SCALES - GENERAL
PAINLEVEL_OUTOF10: 0
PAINLEVEL_OUTOF10: 6
PAINLEVEL_OUTOF10: 0
PAINLEVEL_OUTOF10: 2
PAINLEVEL_OUTOF10: 7
PAINLEVEL_OUTOF10: 8
PAINLEVEL_OUTOF10: 3

## 2019-10-06 LAB
ALBUMIN SERPL-MCNC: 3.4 G/DL (ref 3.5–5.2)
ALP BLD-CCNC: 91 U/L (ref 35–104)
ALT SERPL-CCNC: 8 U/L (ref 0–32)
ANION GAP SERPL CALCULATED.3IONS-SCNC: 6 MMOL/L (ref 7–16)
AST SERPL-CCNC: 17 U/L (ref 0–31)
BILIRUB SERPL-MCNC: <0.2 MG/DL (ref 0–1.2)
BUN BLDV-MCNC: 23 MG/DL (ref 6–20)
CALCIUM SERPL-MCNC: 8.3 MG/DL (ref 8.6–10.2)
CHLORIDE BLD-SCNC: 111 MMOL/L (ref 98–107)
CO2: 24 MMOL/L (ref 22–29)
CREAT SERPL-MCNC: 2 MG/DL (ref 0.5–1)
EKG ATRIAL RATE: 125 BPM
EKG P AXIS: 74 DEGREES
EKG P-R INTERVAL: 112 MS
EKG Q-T INTERVAL: 334 MS
EKG QRS DURATION: 76 MS
EKG QTC CALCULATION (BAZETT): 482 MS
EKG R AXIS: 56 DEGREES
EKG T AXIS: 60 DEGREES
EKG VENTRICULAR RATE: 125 BPM
GFR AFRICAN AMERICAN: 36
GFR NON-AFRICAN AMERICAN: 36 ML/MIN/1.73
GLUCOSE BLD-MCNC: 53 MG/DL (ref 74–99)
GLUCOSE BLD-MCNC: 84 MG/DL (ref 74–99)
HCT VFR BLD CALC: 23.6 % (ref 34–48)
HEMOGLOBIN: 7.6 G/DL (ref 11.5–15.5)
MAGNESIUM: 1.7 MG/DL (ref 1.6–2.6)
MCH RBC QN AUTO: 29.3 PG (ref 26–35)
MCHC RBC AUTO-ENTMCNC: 32.2 % (ref 32–34.5)
MCV RBC AUTO: 91.1 FL (ref 80–99.9)
METER GLUCOSE: 102 MG/DL (ref 74–99)
METER GLUCOSE: 108 MG/DL (ref 74–99)
METER GLUCOSE: 109 MG/DL (ref 74–99)
METER GLUCOSE: 113 MG/DL (ref 74–99)
METER GLUCOSE: 41 MG/DL (ref 74–99)
METER GLUCOSE: 60 MG/DL (ref 74–99)
METER GLUCOSE: 63 MG/DL (ref 74–99)
METER GLUCOSE: 64 MG/DL (ref 74–99)
METER GLUCOSE: 66 MG/DL (ref 74–99)
METER GLUCOSE: 71 MG/DL (ref 74–99)
METER GLUCOSE: 89 MG/DL (ref 74–99)
METER GLUCOSE: 97 MG/DL (ref 74–99)
METER GLUCOSE: <40 MG/DL (ref 74–99)
METER GLUCOSE: <40 MG/DL (ref 74–99)
PDW BLD-RTO: 15.4 FL (ref 11.5–15)
PHOSPHORUS: 3.6 MG/DL (ref 2.5–4.5)
PLATELET # BLD: 253 E9/L (ref 130–450)
PMV BLD AUTO: 10.3 FL (ref 7–12)
POTASSIUM SERPL-SCNC: 4 MMOL/L (ref 3.5–5)
RBC # BLD: 2.59 E12/L (ref 3.5–5.5)
SODIUM BLD-SCNC: 141 MMOL/L (ref 132–146)
TOTAL PROTEIN: 5.8 G/DL (ref 6.4–8.3)
WBC # BLD: 8.4 E9/L (ref 4.5–11.5)

## 2019-10-06 PROCEDURE — 1200000000 HC SEMI PRIVATE

## 2019-10-06 PROCEDURE — P9047 ALBUMIN (HUMAN), 25%, 50ML: HCPCS | Performed by: INTERNAL MEDICINE

## 2019-10-06 PROCEDURE — 6370000000 HC RX 637 (ALT 250 FOR IP): Performed by: INTERNAL MEDICINE

## 2019-10-06 PROCEDURE — 2580000003 HC RX 258: Performed by: INTERNAL MEDICINE

## 2019-10-06 PROCEDURE — 6360000002 HC RX W HCPCS: Performed by: NURSE PRACTITIONER

## 2019-10-06 PROCEDURE — 99233 SBSQ HOSP IP/OBS HIGH 50: CPT | Performed by: INTERNAL MEDICINE

## 2019-10-06 PROCEDURE — 80053 COMPREHEN METABOLIC PANEL: CPT

## 2019-10-06 PROCEDURE — 93010 ELECTROCARDIOGRAM REPORT: CPT | Performed by: INTERNAL MEDICINE

## 2019-10-06 PROCEDURE — 36591 DRAW BLOOD OFF VENOUS DEVICE: CPT

## 2019-10-06 PROCEDURE — 82962 GLUCOSE BLOOD TEST: CPT

## 2019-10-06 PROCEDURE — C9113 INJ PANTOPRAZOLE SODIUM, VIA: HCPCS | Performed by: INTERNAL MEDICINE

## 2019-10-06 PROCEDURE — 6360000002 HC RX W HCPCS: Performed by: INTERNAL MEDICINE

## 2019-10-06 PROCEDURE — 36415 COLL VENOUS BLD VENIPUNCTURE: CPT

## 2019-10-06 PROCEDURE — 93005 ELECTROCARDIOGRAM TRACING: CPT | Performed by: NURSE PRACTITIONER

## 2019-10-06 PROCEDURE — 2500000003 HC RX 250 WO HCPCS: Performed by: INTERNAL MEDICINE

## 2019-10-06 PROCEDURE — 85027 COMPLETE CBC AUTOMATED: CPT

## 2019-10-06 PROCEDURE — 84100 ASSAY OF PHOSPHORUS: CPT

## 2019-10-06 PROCEDURE — 82947 ASSAY GLUCOSE BLOOD QUANT: CPT

## 2019-10-06 PROCEDURE — 83735 ASSAY OF MAGNESIUM: CPT

## 2019-10-06 RX ORDER — PROMETHAZINE HYDROCHLORIDE 25 MG/ML
6.25 INJECTION, SOLUTION INTRAMUSCULAR; INTRAVENOUS EVERY 6 HOURS PRN
Status: DISCONTINUED | OUTPATIENT
Start: 2019-10-06 | End: 2019-10-17 | Stop reason: HOSPADM

## 2019-10-06 RX ORDER — MORPHINE SULFATE 2 MG/ML
2 INJECTION, SOLUTION INTRAMUSCULAR; INTRAVENOUS ONCE
Status: COMPLETED | OUTPATIENT
Start: 2019-10-06 | End: 2019-10-06

## 2019-10-06 RX ORDER — METOPROLOL TARTRATE 5 MG/5ML
5 INJECTION INTRAVENOUS ONCE
Status: COMPLETED | OUTPATIENT
Start: 2019-10-06 | End: 2019-10-06

## 2019-10-06 RX ORDER — ONDANSETRON 2 MG/ML
4 INJECTION INTRAMUSCULAR; INTRAVENOUS EVERY 6 HOURS PRN
Status: DISCONTINUED | OUTPATIENT
Start: 2019-10-06 | End: 2019-10-17 | Stop reason: HOSPADM

## 2019-10-06 RX ADMIN — DEXTROSE MONOHYDRATE 12.5 G: 500 INJECTION PARENTERAL at 11:33

## 2019-10-06 RX ADMIN — ALBUMIN (HUMAN) 25 G: 0.25 INJECTION, SOLUTION INTRAVENOUS at 09:02

## 2019-10-06 RX ADMIN — Medication 10 ML: at 22:35

## 2019-10-06 RX ADMIN — TRAMADOL HYDROCHLORIDE 50 MG: 50 TABLET, FILM COATED ORAL at 22:35

## 2019-10-06 RX ADMIN — HYDRALAZINE HYDROCHLORIDE 50 MG: 50 TABLET ORAL at 22:34

## 2019-10-06 RX ADMIN — HYDRALAZINE HYDROCHLORIDE 50 MG: 50 TABLET ORAL at 15:07

## 2019-10-06 RX ADMIN — METOPROLOL TARTRATE 5 MG: 5 INJECTION, SOLUTION INTRAVENOUS at 10:17

## 2019-10-06 RX ADMIN — DEXTROSE MONOHYDRATE 12.5 G: 500 INJECTION PARENTERAL at 02:05

## 2019-10-06 RX ADMIN — ALBUMIN (HUMAN) 25 G: 0.25 INJECTION, SOLUTION INTRAVENOUS at 01:30

## 2019-10-06 RX ADMIN — DEXTROSE MONOHYDRATE 12.5 G: 500 INJECTION PARENTERAL at 06:39

## 2019-10-06 RX ADMIN — TRAMADOL HYDROCHLORIDE 50 MG: 50 TABLET, FILM COATED ORAL at 16:49

## 2019-10-06 RX ADMIN — PANTOPRAZOLE SODIUM 40 MG: 40 INJECTION, POWDER, FOR SOLUTION INTRAVENOUS at 09:01

## 2019-10-06 RX ADMIN — DICYCLOMINE HYDROCHLORIDE 10 MG: 10 CAPSULE ORAL at 16:49

## 2019-10-06 RX ADMIN — HYDRALAZINE HYDROCHLORIDE 20 MG: 20 INJECTION INTRAMUSCULAR; INTRAVENOUS at 02:46

## 2019-10-06 RX ADMIN — Medication: at 06:24

## 2019-10-06 RX ADMIN — ONDANSETRON 4 MG: 2 INJECTION INTRAMUSCULAR; INTRAVENOUS at 04:03

## 2019-10-06 RX ADMIN — TRAMADOL HYDROCHLORIDE 50 MG: 50 TABLET, FILM COATED ORAL at 09:01

## 2019-10-06 RX ADMIN — Medication 10 ML: at 09:03

## 2019-10-06 RX ADMIN — MORPHINE SULFATE 2 MG: 2 INJECTION, SOLUTION INTRAMUSCULAR; INTRAVENOUS at 04:37

## 2019-10-06 RX ADMIN — TRAMADOL HYDROCHLORIDE 50 MG: 50 TABLET, FILM COATED ORAL at 02:38

## 2019-10-06 RX ADMIN — DEXTROSE MONOHYDRATE 12.5 G: 500 INJECTION PARENTERAL at 13:00

## 2019-10-06 RX ADMIN — HYDROXYZINE PAMOATE 25 MG: 25 CAPSULE ORAL at 04:06

## 2019-10-06 RX ADMIN — ALBUMIN (HUMAN) 25 G: 0.25 INJECTION, SOLUTION INTRAVENOUS at 16:50

## 2019-10-06 RX ADMIN — DICYCLOMINE HYDROCHLORIDE 10 MG: 10 CAPSULE ORAL at 06:28

## 2019-10-06 RX ADMIN — DEXTROSE MONOHYDRATE 12.5 G: 500 INJECTION PARENTERAL at 05:09

## 2019-10-06 RX ADMIN — PROMETHAZINE HYDROCHLORIDE 6.25 MG: 25 INJECTION INTRAMUSCULAR; INTRAVENOUS at 11:29

## 2019-10-06 RX ADMIN — HYDRALAZINE HYDROCHLORIDE 50 MG: 50 TABLET ORAL at 06:27

## 2019-10-06 RX ADMIN — DEXTROSE MONOHYDRATE 12.5 G: 500 INJECTION PARENTERAL at 03:47

## 2019-10-06 RX ADMIN — ONDANSETRON 4 MG: 2 INJECTION INTRAMUSCULAR; INTRAVENOUS at 09:29

## 2019-10-06 ASSESSMENT — PAIN SCALES - GENERAL
PAINLEVEL_OUTOF10: 0
PAINLEVEL_OUTOF10: 9
PAINLEVEL_OUTOF10: 6
PAINLEVEL_OUTOF10: 4
PAINLEVEL_OUTOF10: 8
PAINLEVEL_OUTOF10: 0
PAINLEVEL_OUTOF10: 3
PAINLEVEL_OUTOF10: 8
PAINLEVEL_OUTOF10: 7

## 2019-10-06 ASSESSMENT — PAIN DESCRIPTION - FREQUENCY
FREQUENCY: INTERMITTENT
FREQUENCY: CONTINUOUS

## 2019-10-06 ASSESSMENT — PAIN DESCRIPTION - DESCRIPTORS
DESCRIPTORS: POUNDING
DESCRIPTORS: HEADACHE

## 2019-10-06 ASSESSMENT — PAIN DESCRIPTION - ONSET
ONSET: ON-GOING
ONSET: PROGRESSIVE

## 2019-10-06 ASSESSMENT — PAIN DESCRIPTION - LOCATION
LOCATION: HEAD;ABDOMEN
LOCATION: HEAD
LOCATION: HEAD

## 2019-10-06 ASSESSMENT — PAIN DESCRIPTION - PAIN TYPE
TYPE: ACUTE PAIN

## 2019-10-06 ASSESSMENT — PAIN DESCRIPTION - PROGRESSION
CLINICAL_PROGRESSION: GRADUALLY WORSENING
CLINICAL_PROGRESSION: GRADUALLY WORSENING

## 2019-10-06 ASSESSMENT — PAIN DESCRIPTION - ORIENTATION
ORIENTATION: ANTERIOR
ORIENTATION: ANTERIOR

## 2019-10-07 ENCOUNTER — APPOINTMENT (OUTPATIENT)
Dept: CT IMAGING | Age: 29
DRG: 420 | End: 2019-10-07
Payer: COMMERCIAL

## 2019-10-07 LAB
ABO/RH: NORMAL
ALBUMIN SERPL-MCNC: 4 G/DL (ref 3.5–5.2)
ALP BLD-CCNC: 85 U/L (ref 35–104)
ALT SERPL-CCNC: 13 U/L (ref 0–32)
ANION GAP SERPL CALCULATED.3IONS-SCNC: 9 MMOL/L (ref 7–16)
ANTIBODY SCREEN: NORMAL
AST SERPL-CCNC: 19 U/L (ref 0–31)
BILIRUB SERPL-MCNC: 0.3 MG/DL (ref 0–1.2)
BLOOD BANK DISPENSE STATUS: NORMAL
BLOOD BANK PRODUCT CODE: NORMAL
BLOOD CULTURE, ROUTINE: NORMAL
BPU ID: NORMAL
BUN BLDV-MCNC: 17 MG/DL (ref 6–20)
CALCIUM SERPL-MCNC: 9.1 MG/DL (ref 8.6–10.2)
CHLORIDE BLD-SCNC: 107 MMOL/L (ref 98–107)
CO2: 25 MMOL/L (ref 22–29)
CREAT SERPL-MCNC: 2.1 MG/DL (ref 0.5–1)
CULTURE, BLOOD 2: NORMAL
DESCRIPTION BLOOD BANK: NORMAL
GFR AFRICAN AMERICAN: 34
GFR NON-AFRICAN AMERICAN: 34 ML/MIN/1.73
GLUCOSE BLD-MCNC: 190 MG/DL (ref 74–99)
HCT VFR BLD CALC: 20.1 % (ref 34–48)
HEMOGLOBIN: 6.5 G/DL (ref 11.5–15.5)
MAGNESIUM: 1.5 MG/DL (ref 1.6–2.6)
MCH RBC QN AUTO: 29.3 PG (ref 26–35)
MCHC RBC AUTO-ENTMCNC: 32.3 % (ref 32–34.5)
MCV RBC AUTO: 90.5 FL (ref 80–99.9)
METER GLUCOSE: 103 MG/DL (ref 74–99)
METER GLUCOSE: 122 MG/DL (ref 74–99)
METER GLUCOSE: 137 MG/DL (ref 74–99)
METER GLUCOSE: 158 MG/DL (ref 74–99)
METER GLUCOSE: 287 MG/DL (ref 74–99)
METER GLUCOSE: 41 MG/DL (ref 74–99)
METER GLUCOSE: 58 MG/DL (ref 74–99)
PDW BLD-RTO: 15.3 FL (ref 11.5–15)
PHOSPHORUS: 3.9 MG/DL (ref 2.5–4.5)
PLATELET # BLD: 236 E9/L (ref 130–450)
PMV BLD AUTO: 10.5 FL (ref 7–12)
POTASSIUM SERPL-SCNC: 4.2 MMOL/L (ref 3.5–5)
RBC # BLD: 2.22 E12/L (ref 3.5–5.5)
SODIUM BLD-SCNC: 141 MMOL/L (ref 132–146)
TOTAL PROTEIN: 5.6 G/DL (ref 6.4–8.3)
TSH SERPL DL<=0.05 MIU/L-ACNC: 3.97 UIU/ML (ref 0.27–4.2)
WBC # BLD: 8.6 E9/L (ref 4.5–11.5)

## 2019-10-07 PROCEDURE — P9047 ALBUMIN (HUMAN), 25%, 50ML: HCPCS | Performed by: INTERNAL MEDICINE

## 2019-10-07 PROCEDURE — 1200000000 HC SEMI PRIVATE

## 2019-10-07 PROCEDURE — 99233 SBSQ HOSP IP/OBS HIGH 50: CPT | Performed by: INTERNAL MEDICINE

## 2019-10-07 PROCEDURE — 86850 RBC ANTIBODY SCREEN: CPT

## 2019-10-07 PROCEDURE — 6360000002 HC RX W HCPCS: Performed by: INTERNAL MEDICINE

## 2019-10-07 PROCEDURE — 84443 ASSAY THYROID STIM HORMONE: CPT

## 2019-10-07 PROCEDURE — 2500000003 HC RX 250 WO HCPCS: Performed by: NURSE PRACTITIONER

## 2019-10-07 PROCEDURE — 85027 COMPLETE CBC AUTOMATED: CPT

## 2019-10-07 PROCEDURE — 2580000003 HC RX 258: Performed by: INTERNAL MEDICINE

## 2019-10-07 PROCEDURE — 6370000000 HC RX 637 (ALT 250 FOR IP): Performed by: INTERNAL MEDICINE

## 2019-10-07 PROCEDURE — C9113 INJ PANTOPRAZOLE SODIUM, VIA: HCPCS | Performed by: INTERNAL MEDICINE

## 2019-10-07 PROCEDURE — 84100 ASSAY OF PHOSPHORUS: CPT

## 2019-10-07 PROCEDURE — 36415 COLL VENOUS BLD VENIPUNCTURE: CPT

## 2019-10-07 PROCEDURE — 6370000000 HC RX 637 (ALT 250 FOR IP): Performed by: NURSE PRACTITIONER

## 2019-10-07 PROCEDURE — 86901 BLOOD TYPING SEROLOGIC RH(D): CPT

## 2019-10-07 PROCEDURE — 36430 TRANSFUSION BLD/BLD COMPNT: CPT

## 2019-10-07 PROCEDURE — 86900 BLOOD TYPING SEROLOGIC ABO: CPT

## 2019-10-07 PROCEDURE — 83735 ASSAY OF MAGNESIUM: CPT

## 2019-10-07 PROCEDURE — 6360000002 HC RX W HCPCS: Performed by: NURSE PRACTITIONER

## 2019-10-07 PROCEDURE — 70450 CT HEAD/BRAIN W/O DYE: CPT

## 2019-10-07 PROCEDURE — 82384 ASSAY THREE CATECHOLAMINES: CPT

## 2019-10-07 PROCEDURE — APPSS30 APP SPLIT SHARED TIME 16-30 MINUTES: Performed by: NURSE PRACTITIONER

## 2019-10-07 PROCEDURE — P9016 RBC LEUKOCYTES REDUCED: HCPCS

## 2019-10-07 PROCEDURE — 80053 COMPREHEN METABOLIC PANEL: CPT

## 2019-10-07 PROCEDURE — 82962 GLUCOSE BLOOD TEST: CPT

## 2019-10-07 PROCEDURE — 86923 COMPATIBILITY TEST ELECTRIC: CPT

## 2019-10-07 PROCEDURE — 2580000003 HC RX 258: Performed by: NURSE PRACTITIONER

## 2019-10-07 RX ORDER — LABETALOL 20 MG/4 ML (5 MG/ML) INTRAVENOUS SYRINGE
20 ONCE
Status: COMPLETED | OUTPATIENT
Start: 2019-10-07 | End: 2019-10-07

## 2019-10-07 RX ORDER — DILTIAZEM HYDROCHLORIDE 240 MG/1
240 CAPSULE, COATED, EXTENDED RELEASE ORAL DAILY
Status: DISCONTINUED | OUTPATIENT
Start: 2019-10-07 | End: 2019-10-17 | Stop reason: HOSPADM

## 2019-10-07 RX ORDER — METOPROLOL TARTRATE 50 MG/1
50 TABLET, FILM COATED ORAL 2 TIMES DAILY
Status: DISCONTINUED | OUTPATIENT
Start: 2019-10-07 | End: 2019-10-07 | Stop reason: ALTCHOICE

## 2019-10-07 RX ORDER — CLONIDINE HYDROCHLORIDE 0.2 MG/1
0.2 TABLET ORAL ONCE
Status: DISCONTINUED | OUTPATIENT
Start: 2019-10-07 | End: 2019-10-07

## 2019-10-07 RX ORDER — MAGNESIUM SULFATE IN WATER 40 MG/ML
2 INJECTION, SOLUTION INTRAVENOUS ONCE
Status: COMPLETED | OUTPATIENT
Start: 2019-10-07 | End: 2019-10-07

## 2019-10-07 RX ORDER — 0.9 % SODIUM CHLORIDE 0.9 %
250 INTRAVENOUS SOLUTION INTRAVENOUS ONCE
Status: COMPLETED | OUTPATIENT
Start: 2019-10-07 | End: 2019-10-07

## 2019-10-07 RX ORDER — HYDRALAZINE HYDROCHLORIDE 25 MG/1
25 TABLET, FILM COATED ORAL EVERY 8 HOURS SCHEDULED
Status: DISCONTINUED | OUTPATIENT
Start: 2019-10-07 | End: 2019-10-08

## 2019-10-07 RX ORDER — HYDROMORPHONE HYDROCHLORIDE 1 MG/ML
0.5 INJECTION, SOLUTION INTRAMUSCULAR; INTRAVENOUS; SUBCUTANEOUS ONCE
Status: COMPLETED | OUTPATIENT
Start: 2019-10-07 | End: 2019-10-07

## 2019-10-07 RX ADMIN — HYDROMORPHONE HYDROCHLORIDE 0.5 MG: 1 INJECTION, SOLUTION INTRAMUSCULAR; INTRAVENOUS; SUBCUTANEOUS at 10:17

## 2019-10-07 RX ADMIN — DEXTROSE MONOHYDRATE 12.5 G: 500 INJECTION PARENTERAL at 02:48

## 2019-10-07 RX ADMIN — HYDRALAZINE HYDROCHLORIDE 20 MG: 20 INJECTION INTRAMUSCULAR; INTRAVENOUS at 08:40

## 2019-10-07 RX ADMIN — ALBUMIN (HUMAN) 25 G: 0.25 INJECTION, SOLUTION INTRAVENOUS at 11:18

## 2019-10-07 RX ADMIN — PANTOPRAZOLE SODIUM 40 MG: 40 INJECTION, POWDER, FOR SOLUTION INTRAVENOUS at 08:39

## 2019-10-07 RX ADMIN — LABETALOL 20 MG/4 ML (5 MG/ML) INTRAVENOUS SYRINGE 20 MG: at 10:18

## 2019-10-07 RX ADMIN — ONDANSETRON 4 MG: 2 INJECTION INTRAMUSCULAR; INTRAVENOUS at 15:36

## 2019-10-07 RX ADMIN — Medication 10 ML: at 08:40

## 2019-10-07 RX ADMIN — TRAMADOL HYDROCHLORIDE 50 MG: 50 TABLET, FILM COATED ORAL at 22:55

## 2019-10-07 RX ADMIN — DILTIAZEM HYDROCHLORIDE 240 MG: 240 CAPSULE, COATED, EXTENDED RELEASE ORAL at 12:44

## 2019-10-07 RX ADMIN — HYDRALAZINE HYDROCHLORIDE 50 MG: 50 TABLET ORAL at 06:00

## 2019-10-07 RX ADMIN — DEXTROSE MONOHYDRATE 12.5 G: 500 INJECTION PARENTERAL at 06:10

## 2019-10-07 RX ADMIN — TRAMADOL HYDROCHLORIDE 50 MG: 50 TABLET, FILM COATED ORAL at 15:36

## 2019-10-07 RX ADMIN — ONDANSETRON 4 MG: 2 INJECTION INTRAMUSCULAR; INTRAVENOUS at 09:40

## 2019-10-07 RX ADMIN — TRAMADOL HYDROCHLORIDE 50 MG: 50 TABLET, FILM COATED ORAL at 08:40

## 2019-10-07 RX ADMIN — HYDRALAZINE HYDROCHLORIDE 20 MG: 20 INJECTION INTRAMUSCULAR; INTRAVENOUS at 18:02

## 2019-10-07 RX ADMIN — HYDRALAZINE HYDROCHLORIDE 25 MG: 25 TABLET, FILM COATED ORAL at 21:04

## 2019-10-07 RX ADMIN — HYDRALAZINE HYDROCHLORIDE 25 MG: 25 TABLET, FILM COATED ORAL at 14:19

## 2019-10-07 RX ADMIN — DICYCLOMINE HYDROCHLORIDE 10 MG: 10 CAPSULE ORAL at 08:40

## 2019-10-07 RX ADMIN — ALBUMIN (HUMAN) 25 G: 0.25 INJECTION, SOLUTION INTRAVENOUS at 01:17

## 2019-10-07 RX ADMIN — Medication 10 ML: at 10:20

## 2019-10-07 RX ADMIN — MAGNESIUM SULFATE HEPTAHYDRATE 2 G: 40 INJECTION, SOLUTION INTRAVENOUS at 15:30

## 2019-10-07 RX ADMIN — SODIUM CHLORIDE 250 ML: 9 INJECTION, SOLUTION INTRAVENOUS at 17:46

## 2019-10-07 RX ADMIN — INSULIN LISPRO 6 UNITS: 100 INJECTION, SOLUTION INTRAVENOUS; SUBCUTANEOUS at 16:39

## 2019-10-07 RX ADMIN — Medication 10 ML: at 21:05

## 2019-10-07 ASSESSMENT — PAIN SCALES - GENERAL
PAINLEVEL_OUTOF10: 8
PAINLEVEL_OUTOF10: 6
PAINLEVEL_OUTOF10: 3
PAINLEVEL_OUTOF10: 8
PAINLEVEL_OUTOF10: 2
PAINLEVEL_OUTOF10: 10

## 2019-10-07 ASSESSMENT — PAIN DESCRIPTION - LOCATION
LOCATION: HEAD

## 2019-10-07 ASSESSMENT — PAIN DESCRIPTION - DESCRIPTORS: DESCRIPTORS: HEADACHE

## 2019-10-07 ASSESSMENT — PAIN DESCRIPTION - PAIN TYPE
TYPE: ACUTE PAIN

## 2019-10-07 ASSESSMENT — PAIN DESCRIPTION - ORIENTATION
ORIENTATION: ANTERIOR
ORIENTATION: ANTERIOR

## 2019-10-08 LAB
ALBUMIN SERPL-MCNC: 3.6 G/DL (ref 3.5–5.2)
ALP BLD-CCNC: 155 U/L (ref 35–104)
ALT SERPL-CCNC: 20 U/L (ref 0–32)
AMPHETAMINE SCREEN, URINE: NOT DETECTED
ANION GAP SERPL CALCULATED.3IONS-SCNC: 9 MMOL/L (ref 7–16)
AST SERPL-CCNC: 31 U/L (ref 0–31)
BARBITURATE SCREEN URINE: NOT DETECTED
BASOPHILS ABSOLUTE: 0.08 E9/L (ref 0–0.2)
BASOPHILS RELATIVE PERCENT: 1 % (ref 0–2)
BENZODIAZEPINE SCREEN, URINE: NOT DETECTED
BILIRUB SERPL-MCNC: 0.4 MG/DL (ref 0–1.2)
BUN BLDV-MCNC: 21 MG/DL (ref 6–20)
CALCIUM SERPL-MCNC: 8.7 MG/DL (ref 8.6–10.2)
CANNABINOID SCREEN URINE: NOT DETECTED
CHLORIDE BLD-SCNC: 104 MMOL/L (ref 98–107)
CO2: 25 MMOL/L (ref 22–29)
COCAINE METABOLITE SCREEN URINE: NOT DETECTED
CREAT SERPL-MCNC: 2.5 MG/DL (ref 0.5–1)
EOSINOPHILS ABSOLUTE: 0.49 E9/L (ref 0.05–0.5)
EOSINOPHILS RELATIVE PERCENT: 6.3 % (ref 0–6)
GFR AFRICAN AMERICAN: 28
GFR NON-AFRICAN AMERICAN: 28 ML/MIN/1.73
GLUCOSE BLD-MCNC: 318 MG/DL (ref 74–99)
GONADOTROPIN, CHORIONIC (HCG) QUANT: 2 MIU/ML
HCT VFR BLD CALC: 24.6 % (ref 34–48)
HEMOGLOBIN: 8.1 G/DL (ref 11.5–15.5)
IMMATURE GRANULOCYTES #: 0.03 E9/L
IMMATURE GRANULOCYTES %: 0.4 % (ref 0–5)
LYMPHOCYTES ABSOLUTE: 1.47 E9/L (ref 1.5–4)
LYMPHOCYTES RELATIVE PERCENT: 19 % (ref 20–42)
Lab: NORMAL
MAGNESIUM: 2.3 MG/DL (ref 1.6–2.6)
MCH RBC QN AUTO: 30.6 PG (ref 26–35)
MCHC RBC AUTO-ENTMCNC: 32.9 % (ref 32–34.5)
MCV RBC AUTO: 92.8 FL (ref 80–99.9)
METER GLUCOSE: 104 MG/DL (ref 74–99)
METER GLUCOSE: 158 MG/DL (ref 74–99)
METER GLUCOSE: 312 MG/DL (ref 74–99)
METER GLUCOSE: 393 MG/DL (ref 74–99)
METER GLUCOSE: 409 MG/DL (ref 74–99)
METHADONE SCREEN, URINE: NOT DETECTED
MONOCYTES ABSOLUTE: 0.45 E9/L (ref 0.1–0.95)
MONOCYTES RELATIVE PERCENT: 5.8 % (ref 2–12)
NEUTROPHILS ABSOLUTE: 5.2 E9/L (ref 1.8–7.3)
NEUTROPHILS RELATIVE PERCENT: 67.5 % (ref 43–80)
OPIATE SCREEN URINE: NOT DETECTED
PDW BLD-RTO: 15.2 FL (ref 11.5–15)
PHENCYCLIDINE SCREEN URINE: NOT DETECTED
PLATELET # BLD: 274 E9/L (ref 130–450)
PMV BLD AUTO: 10.4 FL (ref 7–12)
POTASSIUM SERPL-SCNC: 5 MMOL/L (ref 3.5–5)
PROPOXYPHENE SCREEN: NOT DETECTED
RBC # BLD: 2.65 E12/L (ref 3.5–5.5)
SODIUM BLD-SCNC: 138 MMOL/L (ref 132–146)
TOTAL PROTEIN: 5.5 G/DL (ref 6.4–8.3)
WBC # BLD: 7.7 E9/L (ref 4.5–11.5)

## 2019-10-08 PROCEDURE — 2580000003 HC RX 258: Performed by: INTERNAL MEDICINE

## 2019-10-08 PROCEDURE — 99233 SBSQ HOSP IP/OBS HIGH 50: CPT | Performed by: INTERNAL MEDICINE

## 2019-10-08 PROCEDURE — 80053 COMPREHEN METABOLIC PANEL: CPT

## 2019-10-08 PROCEDURE — 82962 GLUCOSE BLOOD TEST: CPT

## 2019-10-08 PROCEDURE — 6370000000 HC RX 637 (ALT 250 FOR IP): Performed by: INTERNAL MEDICINE

## 2019-10-08 PROCEDURE — 84702 CHORIONIC GONADOTROPIN TEST: CPT

## 2019-10-08 PROCEDURE — C9113 INJ PANTOPRAZOLE SODIUM, VIA: HCPCS | Performed by: INTERNAL MEDICINE

## 2019-10-08 PROCEDURE — 36415 COLL VENOUS BLD VENIPUNCTURE: CPT

## 2019-10-08 PROCEDURE — 6360000002 HC RX W HCPCS: Performed by: INTERNAL MEDICINE

## 2019-10-08 PROCEDURE — 6370000000 HC RX 637 (ALT 250 FOR IP): Performed by: NURSE PRACTITIONER

## 2019-10-08 PROCEDURE — 1200000000 HC SEMI PRIVATE

## 2019-10-08 PROCEDURE — 85025 COMPLETE CBC W/AUTO DIFF WBC: CPT

## 2019-10-08 PROCEDURE — 83735 ASSAY OF MAGNESIUM: CPT

## 2019-10-08 PROCEDURE — 36592 COLLECT BLOOD FROM PICC: CPT

## 2019-10-08 PROCEDURE — 80307 DRUG TEST PRSMV CHEM ANLYZR: CPT

## 2019-10-08 RX ORDER — BUTALBITAL, ACETAMINOPHEN AND CAFFEINE 50; 325; 40 MG/1; MG/1; MG/1
1 TABLET ORAL EVERY 4 HOURS PRN
Status: DISCONTINUED | OUTPATIENT
Start: 2019-10-08 | End: 2019-10-17 | Stop reason: HOSPADM

## 2019-10-08 RX ORDER — METHYLPREDNISOLONE SODIUM SUCCINATE 40 MG/ML
40 INJECTION, POWDER, LYOPHILIZED, FOR SOLUTION INTRAMUSCULAR; INTRAVENOUS EVERY 8 HOURS
Status: DISCONTINUED | OUTPATIENT
Start: 2019-10-08 | End: 2019-10-08

## 2019-10-08 RX ORDER — INSULIN GLARGINE 100 [IU]/ML
18 INJECTION, SOLUTION SUBCUTANEOUS EVERY MORNING
Status: DISCONTINUED | OUTPATIENT
Start: 2019-10-08 | End: 2019-10-09

## 2019-10-08 RX ADMIN — BUTALBITAL, ACETAMINOPHEN AND CAFFEINE 1 TABLET: 50; 325; 40 TABLET ORAL at 20:39

## 2019-10-08 RX ADMIN — TRAMADOL HYDROCHLORIDE 50 MG: 50 TABLET, FILM COATED ORAL at 07:55

## 2019-10-08 RX ADMIN — INSULIN GLARGINE 18 UNITS: 100 INJECTION, SOLUTION SUBCUTANEOUS at 11:52

## 2019-10-08 RX ADMIN — INSULIN LISPRO 12 UNITS: 100 INJECTION, SOLUTION INTRAVENOUS; SUBCUTANEOUS at 09:29

## 2019-10-08 RX ADMIN — BUTALBITAL, ACETAMINOPHEN AND CAFFEINE 1 TABLET: 50; 325; 40 TABLET ORAL at 16:14

## 2019-10-08 RX ADMIN — PANTOPRAZOLE SODIUM 40 MG: 40 INJECTION, POWDER, FOR SOLUTION INTRAVENOUS at 09:28

## 2019-10-08 RX ADMIN — Medication 10 ML: at 09:41

## 2019-10-08 RX ADMIN — HYDRALAZINE HYDROCHLORIDE 25 MG: 25 TABLET, FILM COATED ORAL at 06:03

## 2019-10-08 RX ADMIN — METOPROLOL TARTRATE 25 MG: 25 TABLET ORAL at 20:00

## 2019-10-08 RX ADMIN — DILTIAZEM HYDROCHLORIDE 240 MG: 240 CAPSULE, COATED, EXTENDED RELEASE ORAL at 10:59

## 2019-10-08 RX ADMIN — INSULIN LISPRO 2 UNITS: 100 INJECTION, SOLUTION INTRAVENOUS; SUBCUTANEOUS at 16:14

## 2019-10-08 RX ADMIN — BUTALBITAL, ACETAMINOPHEN AND CAFFEINE 1 TABLET: 50; 325; 40 TABLET ORAL at 12:15

## 2019-10-08 RX ADMIN — Medication 10 ML: at 09:29

## 2019-10-08 RX ADMIN — INSULIN LISPRO 0 UNITS: 100 INJECTION, SOLUTION INTRAVENOUS; SUBCUTANEOUS at 20:01

## 2019-10-08 RX ADMIN — HYDROXYZINE PAMOATE 25 MG: 25 CAPSULE ORAL at 20:00

## 2019-10-08 ASSESSMENT — PAIN DESCRIPTION - DESCRIPTORS
DESCRIPTORS: HEADACHE
DESCRIPTORS: ACHING;HEADACHE

## 2019-10-08 ASSESSMENT — PAIN DESCRIPTION - ONSET: ONSET: ON-GOING

## 2019-10-08 ASSESSMENT — PAIN DESCRIPTION - LOCATION
LOCATION: HEAD
LOCATION: HEAD

## 2019-10-08 ASSESSMENT — PAIN SCALES - GENERAL
PAINLEVEL_OUTOF10: 6
PAINLEVEL_OUTOF10: 6
PAINLEVEL_OUTOF10: 9
PAINLEVEL_OUTOF10: 7
PAINLEVEL_OUTOF10: 6
PAINLEVEL_OUTOF10: 0
PAINLEVEL_OUTOF10: 1
PAINLEVEL_OUTOF10: 6
PAINLEVEL_OUTOF10: 3

## 2019-10-08 ASSESSMENT — PAIN DESCRIPTION - PAIN TYPE
TYPE: ACUTE PAIN
TYPE: OTHER (COMMENT)

## 2019-10-08 ASSESSMENT — PAIN - FUNCTIONAL ASSESSMENT: PAIN_FUNCTIONAL_ASSESSMENT: ACTIVITIES ARE NOT PREVENTED

## 2019-10-08 ASSESSMENT — PAIN DESCRIPTION - PROGRESSION: CLINICAL_PROGRESSION: GRADUALLY WORSENING

## 2019-10-08 ASSESSMENT — PAIN DESCRIPTION - FREQUENCY: FREQUENCY: CONTINUOUS

## 2019-10-09 ENCOUNTER — APPOINTMENT (OUTPATIENT)
Dept: INTERVENTIONAL RADIOLOGY/VASCULAR | Age: 29
DRG: 420 | End: 2019-10-09
Payer: COMMERCIAL

## 2019-10-09 PROBLEM — R51.9 CEPHALGIA: Status: ACTIVE | Noted: 2019-10-09

## 2019-10-09 LAB
ANION GAP SERPL CALCULATED.3IONS-SCNC: 5 MMOL/L (ref 7–16)
APPEARANCE CSF: CLEAR
BASOPHILS ABSOLUTE: 0.09 E9/L (ref 0–0.2)
BASOPHILS RELATIVE PERCENT: 1.3 % (ref 0–2)
BUN BLDV-MCNC: 33 MG/DL (ref 6–20)
CALCIUM SERPL-MCNC: 8.7 MG/DL (ref 8.6–10.2)
CHLORIDE BLD-SCNC: 109 MMOL/L (ref 98–107)
CO2: 26 MMOL/L (ref 22–29)
COLOR CSF: COLORLESS
CREAT SERPL-MCNC: 3 MG/DL (ref 0.5–1)
EOSINOPHILS ABSOLUTE: 0.46 E9/L (ref 0.05–0.5)
EOSINOPHILS RELATIVE PERCENT: 6.8 % (ref 0–6)
GFR AFRICAN AMERICAN: 23
GFR NON-AFRICAN AMERICAN: 23 ML/MIN/1.73
GLUCOSE BLD-MCNC: 71 MG/DL (ref 74–99)
GLUCOSE, CSF: 66 MG/DL (ref 40–70)
GRAM STAIN ORDERABLE: NORMAL
HCT VFR BLD CALC: 22.5 % (ref 34–48)
HEMOGLOBIN: 7.1 G/DL (ref 11.5–15.5)
IMMATURE GRANULOCYTES #: 0.02 E9/L
IMMATURE GRANULOCYTES %: 0.3 % (ref 0–5)
INR BLD: 1
LYMPHOCYTES ABSOLUTE: 1.98 E9/L (ref 1.5–4)
LYMPHOCYTES RELATIVE PERCENT: 29.4 % (ref 20–42)
MCH RBC QN AUTO: 29.7 PG (ref 26–35)
MCHC RBC AUTO-ENTMCNC: 31.6 % (ref 32–34.5)
MCV RBC AUTO: 94.1 FL (ref 80–99.9)
METER GLUCOSE: 112 MG/DL (ref 74–99)
METER GLUCOSE: 118 MG/DL (ref 74–99)
METER GLUCOSE: 296 MG/DL (ref 74–99)
METER GLUCOSE: 98 MG/DL (ref 74–99)
MONOCYTE, CSF: 100 % (ref 10–70)
MONOCYTES ABSOLUTE: 0.45 E9/L (ref 0.1–0.95)
MONOCYTES RELATIVE PERCENT: 6.7 % (ref 2–12)
NEUTROPHILS ABSOLUTE: 3.74 E9/L (ref 1.8–7.3)
NEUTROPHILS RELATIVE PERCENT: 55.5 % (ref 43–80)
NEUTROPHILS, CSF: 0 % (ref 0–10)
PDW BLD-RTO: 15.5 FL (ref 11.5–15)
PLATELET # BLD: 236 E9/L (ref 130–450)
PMV BLD AUTO: 10.2 FL (ref 7–12)
POTASSIUM SERPL-SCNC: 4.9 MMOL/L (ref 3.5–5)
PROTEIN CSF: 16 MG/DL (ref 15–40)
PROTHROMBIN TIME: 11.5 SEC (ref 9.3–12.4)
RBC # BLD: 2.39 E12/L (ref 3.5–5.5)
RBC CSF: <2000 /UL
REASON FOR REJECTION: NORMAL
REJECTED TEST: NORMAL
SODIUM BLD-SCNC: 140 MMOL/L (ref 132–146)
TUBE NUMBER CSF: ABNORMAL
WBC # BLD: 6.7 E9/L (ref 4.5–11.5)
WBC CSF: <3 /UL (ref 0–2)

## 2019-10-09 PROCEDURE — 2580000003 HC RX 258: Performed by: NURSE PRACTITIONER

## 2019-10-09 PROCEDURE — 84157 ASSAY OF PROTEIN OTHER: CPT

## 2019-10-09 PROCEDURE — 6370000000 HC RX 637 (ALT 250 FOR IP): Performed by: INTERNAL MEDICINE

## 2019-10-09 PROCEDURE — 89051 BODY FLUID CELL COUNT: CPT

## 2019-10-09 PROCEDURE — 6360000002 HC RX W HCPCS: Performed by: INTERNAL MEDICINE

## 2019-10-09 PROCEDURE — 99233 SBSQ HOSP IP/OBS HIGH 50: CPT | Performed by: INTERNAL MEDICINE

## 2019-10-09 PROCEDURE — 85610 PROTHROMBIN TIME: CPT

## 2019-10-09 PROCEDURE — 36415 COLL VENOUS BLD VENIPUNCTURE: CPT

## 2019-10-09 PROCEDURE — APPSS30 APP SPLIT SHARED TIME 16-30 MINUTES: Performed by: NURSE PRACTITIONER

## 2019-10-09 PROCEDURE — 00JU3ZZ INSPECTION OF SPINAL CANAL, PERCUTANEOUS APPROACH: ICD-10-PCS | Performed by: RADIOLOGY

## 2019-10-09 PROCEDURE — 77003 FLUOROGUIDE FOR SPINE INJECT: CPT | Performed by: RADIOLOGY

## 2019-10-09 PROCEDURE — 87205 SMEAR GRAM STAIN: CPT

## 2019-10-09 PROCEDURE — 1200000000 HC SEMI PRIVATE

## 2019-10-09 PROCEDURE — 80048 BASIC METABOLIC PNL TOTAL CA: CPT

## 2019-10-09 PROCEDURE — G0328 FECAL BLOOD SCRN IMMUNOASSAY: HCPCS

## 2019-10-09 PROCEDURE — 82945 GLUCOSE OTHER FLUID: CPT

## 2019-10-09 PROCEDURE — 62270 DX LMBR SPI PNXR: CPT | Performed by: RADIOLOGY

## 2019-10-09 PROCEDURE — 6370000000 HC RX 637 (ALT 250 FOR IP): Performed by: NURSE PRACTITIONER

## 2019-10-09 PROCEDURE — 85025 COMPLETE CBC W/AUTO DIFF WBC: CPT

## 2019-10-09 PROCEDURE — 82962 GLUCOSE BLOOD TEST: CPT

## 2019-10-09 RX ORDER — INSULIN GLARGINE 100 [IU]/ML
18 INJECTION, SOLUTION SUBCUTANEOUS NIGHTLY
Status: DISCONTINUED | OUTPATIENT
Start: 2019-10-09 | End: 2019-10-10

## 2019-10-09 RX ORDER — SODIUM CHLORIDE 9 MG/ML
INJECTION, SOLUTION INTRAVENOUS CONTINUOUS
Status: DISCONTINUED | OUTPATIENT
Start: 2019-10-09 | End: 2019-10-11

## 2019-10-09 RX ADMIN — PROMETHAZINE HYDROCHLORIDE 6.25 MG: 25 INJECTION INTRAMUSCULAR; INTRAVENOUS at 19:03

## 2019-10-09 RX ADMIN — METOPROLOL TARTRATE 25 MG: 25 TABLET ORAL at 10:12

## 2019-10-09 RX ADMIN — BUTALBITAL, ACETAMINOPHEN AND CAFFEINE 1 TABLET: 50; 325; 40 TABLET ORAL at 08:59

## 2019-10-09 RX ADMIN — METOPROLOL TARTRATE 25 MG: 25 TABLET ORAL at 21:00

## 2019-10-09 RX ADMIN — HYDROXYZINE PAMOATE 25 MG: 25 CAPSULE ORAL at 12:15

## 2019-10-09 RX ADMIN — SODIUM CHLORIDE: 9 INJECTION, SOLUTION INTRAVENOUS at 12:45

## 2019-10-09 RX ADMIN — BUTALBITAL, ACETAMINOPHEN AND CAFFEINE 1 TABLET: 50; 325; 40 TABLET ORAL at 13:29

## 2019-10-09 RX ADMIN — DILTIAZEM HYDROCHLORIDE 240 MG: 240 CAPSULE, COATED, EXTENDED RELEASE ORAL at 12:15

## 2019-10-09 RX ADMIN — INSULIN GLARGINE 18 UNITS: 100 INJECTION, SOLUTION SUBCUTANEOUS at 21:00

## 2019-10-09 RX ADMIN — INSULIN LISPRO 6 UNITS: 100 INJECTION, SOLUTION INTRAVENOUS; SUBCUTANEOUS at 17:07

## 2019-10-09 RX ADMIN — BUTALBITAL, ACETAMINOPHEN AND CAFFEINE 1 TABLET: 50; 325; 40 TABLET ORAL at 21:00

## 2019-10-09 ASSESSMENT — PAIN DESCRIPTION - ORIENTATION
ORIENTATION: RIGHT;LEFT
ORIENTATION: RIGHT;LEFT

## 2019-10-09 ASSESSMENT — PAIN DESCRIPTION - ONSET
ONSET: ON-GOING
ONSET: ON-GOING

## 2019-10-09 ASSESSMENT — PAIN DESCRIPTION - LOCATION
LOCATION: HEAD
LOCATION: HEAD

## 2019-10-09 ASSESSMENT — PAIN - FUNCTIONAL ASSESSMENT
PAIN_FUNCTIONAL_ASSESSMENT: ACTIVITIES ARE NOT PREVENTED
PAIN_FUNCTIONAL_ASSESSMENT: PREVENTS OR INTERFERES SOME ACTIVE ACTIVITIES AND ADLS

## 2019-10-09 ASSESSMENT — PAIN SCALES - GENERAL
PAINLEVEL_OUTOF10: 5
PAINLEVEL_OUTOF10: 7
PAINLEVEL_OUTOF10: 9
PAINLEVEL_OUTOF10: 6
PAINLEVEL_OUTOF10: 0
PAINLEVEL_OUTOF10: 4
PAINLEVEL_OUTOF10: 2
PAINLEVEL_OUTOF10: 2

## 2019-10-09 ASSESSMENT — PAIN DESCRIPTION - PAIN TYPE
TYPE: ACUTE PAIN
TYPE: ACUTE PAIN

## 2019-10-09 ASSESSMENT — PAIN DESCRIPTION - PROGRESSION
CLINICAL_PROGRESSION: GRADUALLY IMPROVING
CLINICAL_PROGRESSION: GRADUALLY WORSENING

## 2019-10-09 ASSESSMENT — PAIN DESCRIPTION - FREQUENCY
FREQUENCY: CONTINUOUS
FREQUENCY: CONTINUOUS

## 2019-10-09 ASSESSMENT — PAIN DESCRIPTION - DESCRIPTORS
DESCRIPTORS: HEADACHE
DESCRIPTORS: ACHING;DISCOMFORT;DULL

## 2019-10-10 ENCOUNTER — ANESTHESIA EVENT (OUTPATIENT)
Dept: ENDOSCOPY | Age: 29
DRG: 420 | End: 2019-10-10
Payer: COMMERCIAL

## 2019-10-10 LAB
ANION GAP SERPL CALCULATED.3IONS-SCNC: 7 MMOL/L (ref 7–16)
BASOPHILS ABSOLUTE: 0.07 E9/L (ref 0–0.2)
BASOPHILS RELATIVE PERCENT: 0.9 % (ref 0–2)
BUN BLDV-MCNC: 47 MG/DL (ref 6–20)
CALCIUM SERPL-MCNC: 8.5 MG/DL (ref 8.6–10.2)
CHLORIDE BLD-SCNC: 111 MMOL/L (ref 98–107)
CO2: 24 MMOL/L (ref 22–29)
CREAT SERPL-MCNC: 3.2 MG/DL (ref 0.5–1)
EOSINOPHILS ABSOLUTE: 0.52 E9/L (ref 0.05–0.5)
EOSINOPHILS RELATIVE PERCENT: 6.3 % (ref 0–6)
GFR AFRICAN AMERICAN: 21
GFR NON-AFRICAN AMERICAN: 21 ML/MIN/1.73
GLUCOSE BLD-MCNC: 336 MG/DL (ref 74–99)
HCT VFR BLD CALC: 23.1 % (ref 34–48)
HEMOGLOBIN: 7.3 G/DL (ref 11.5–15.5)
IMMATURE GRANULOCYTES #: 0.04 E9/L
IMMATURE GRANULOCYTES %: 0.5 % (ref 0–5)
LYMPHOCYTES ABSOLUTE: 1.47 E9/L (ref 1.5–4)
LYMPHOCYTES RELATIVE PERCENT: 17.9 % (ref 20–42)
MCH RBC QN AUTO: 29.9 PG (ref 26–35)
MCHC RBC AUTO-ENTMCNC: 31.6 % (ref 32–34.5)
MCV RBC AUTO: 94.7 FL (ref 80–99.9)
METER GLUCOSE: 229 MG/DL (ref 74–99)
METER GLUCOSE: 339 MG/DL (ref 74–99)
METER GLUCOSE: 43 MG/DL (ref 74–99)
METER GLUCOSE: 453 MG/DL (ref 74–99)
METER GLUCOSE: 61 MG/DL (ref 74–99)
METER GLUCOSE: 71 MG/DL (ref 74–99)
MONOCYTES ABSOLUTE: 0.39 E9/L (ref 0.1–0.95)
MONOCYTES RELATIVE PERCENT: 4.7 % (ref 2–12)
NEUTROPHILS ABSOLUTE: 5.73 E9/L (ref 1.8–7.3)
NEUTROPHILS RELATIVE PERCENT: 69.7 % (ref 43–80)
OCCULT BLOOD SCREENING: NORMAL
PDW BLD-RTO: 15.4 FL (ref 11.5–15)
PLATELET # BLD: 304 E9/L (ref 130–450)
PMV BLD AUTO: 9.8 FL (ref 7–12)
POTASSIUM SERPL-SCNC: 5.3 MMOL/L (ref 3.5–5)
POTASSIUM SERPL-SCNC: 5.8 MMOL/L (ref 3.5–5)
RBC # BLD: 2.44 E12/L (ref 3.5–5.5)
SODIUM BLD-SCNC: 142 MMOL/L (ref 132–146)
WBC # BLD: 8.2 E9/L (ref 4.5–11.5)

## 2019-10-10 PROCEDURE — 6370000000 HC RX 637 (ALT 250 FOR IP): Performed by: INTERNAL MEDICINE

## 2019-10-10 PROCEDURE — 2580000003 HC RX 258: Performed by: NURSE PRACTITIONER

## 2019-10-10 PROCEDURE — 2580000003 HC RX 258: Performed by: STUDENT IN AN ORGANIZED HEALTH CARE EDUCATION/TRAINING PROGRAM

## 2019-10-10 PROCEDURE — 6360000002 HC RX W HCPCS: Performed by: INTERNAL MEDICINE

## 2019-10-10 PROCEDURE — 2500000003 HC RX 250 WO HCPCS: Performed by: HOSPITALIST

## 2019-10-10 PROCEDURE — 6370000000 HC RX 637 (ALT 250 FOR IP): Performed by: NURSE PRACTITIONER

## 2019-10-10 PROCEDURE — 85025 COMPLETE CBC W/AUTO DIFF WBC: CPT

## 2019-10-10 PROCEDURE — 2580000003 HC RX 258: Performed by: INTERNAL MEDICINE

## 2019-10-10 PROCEDURE — APPSS30 APP SPLIT SHARED TIME 16-30 MINUTES: Performed by: NURSE PRACTITIONER

## 2019-10-10 PROCEDURE — 6360000002 HC RX W HCPCS: Performed by: STUDENT IN AN ORGANIZED HEALTH CARE EDUCATION/TRAINING PROGRAM

## 2019-10-10 PROCEDURE — 51798 US URINE CAPACITY MEASURE: CPT

## 2019-10-10 PROCEDURE — 36415 COLL VENOUS BLD VENIPUNCTURE: CPT

## 2019-10-10 PROCEDURE — 84132 ASSAY OF SERUM POTASSIUM: CPT

## 2019-10-10 PROCEDURE — 36591 DRAW BLOOD OFF VENOUS DEVICE: CPT

## 2019-10-10 PROCEDURE — 82962 GLUCOSE BLOOD TEST: CPT

## 2019-10-10 PROCEDURE — 1200000000 HC SEMI PRIVATE

## 2019-10-10 PROCEDURE — 99233 SBSQ HOSP IP/OBS HIGH 50: CPT | Performed by: INTERNAL MEDICINE

## 2019-10-10 PROCEDURE — 80048 BASIC METABOLIC PNL TOTAL CA: CPT

## 2019-10-10 RX ORDER — PANTOPRAZOLE SODIUM 40 MG/10ML
40 INJECTION, POWDER, LYOPHILIZED, FOR SOLUTION INTRAVENOUS 2 TIMES DAILY
Status: DISCONTINUED | OUTPATIENT
Start: 2019-10-10 | End: 2019-10-10

## 2019-10-10 RX ORDER — OXYCODONE HYDROCHLORIDE AND ACETAMINOPHEN 5; 325 MG/1; MG/1
1 TABLET ORAL EVERY 4 HOURS PRN
Status: DISCONTINUED | OUTPATIENT
Start: 2019-10-10 | End: 2019-10-13

## 2019-10-10 RX ORDER — CYCLOBENZAPRINE HCL 10 MG
10 TABLET ORAL 3 TIMES DAILY PRN
Status: DISCONTINUED | OUTPATIENT
Start: 2019-10-10 | End: 2019-10-17 | Stop reason: HOSPADM

## 2019-10-10 RX ORDER — AMITRIPTYLINE HYDROCHLORIDE 10 MG/1
10 TABLET, FILM COATED ORAL NIGHTLY
Status: DISCONTINUED | OUTPATIENT
Start: 2019-10-10 | End: 2019-10-17 | Stop reason: HOSPADM

## 2019-10-10 RX ORDER — METHYLPREDNISOLONE SODIUM SUCCINATE 40 MG/ML
40 INJECTION, POWDER, LYOPHILIZED, FOR SOLUTION INTRAMUSCULAR; INTRAVENOUS EVERY 8 HOURS
Status: DISCONTINUED | OUTPATIENT
Start: 2019-10-10 | End: 2019-10-16

## 2019-10-10 RX ORDER — INSULIN GLARGINE 100 [IU]/ML
24 INJECTION, SOLUTION SUBCUTANEOUS NIGHTLY
Status: DISCONTINUED | OUTPATIENT
Start: 2019-10-10 | End: 2019-10-14

## 2019-10-10 RX ORDER — PANTOPRAZOLE SODIUM 40 MG/10ML
80 INJECTION, POWDER, LYOPHILIZED, FOR SOLUTION INTRAVENOUS ONCE
Status: DISCONTINUED | OUTPATIENT
Start: 2019-10-10 | End: 2019-10-10

## 2019-10-10 RX ADMIN — METHYLPREDNISOLONE SODIUM SUCCINATE 40 MG: 40 INJECTION, POWDER, FOR SOLUTION INTRAMUSCULAR; INTRAVENOUS at 10:55

## 2019-10-10 RX ADMIN — BUTALBITAL, ACETAMINOPHEN AND CAFFEINE 1 TABLET: 50; 325; 40 TABLET ORAL at 03:06

## 2019-10-10 RX ADMIN — AMITRIPTYLINE HYDROCHLORIDE 10 MG: 10 TABLET, FILM COATED ORAL at 21:03

## 2019-10-10 RX ADMIN — OXYCODONE HYDROCHLORIDE AND ACETAMINOPHEN 1 TABLET: 5; 325 TABLET ORAL at 17:03

## 2019-10-10 RX ADMIN — HYDROXYZINE PAMOATE 25 MG: 25 CAPSULE ORAL at 14:12

## 2019-10-10 RX ADMIN — FAMOTIDINE 20 MG: 10 INJECTION, SOLUTION INTRAVENOUS at 20:44

## 2019-10-10 RX ADMIN — INSULIN GLARGINE 24 UNITS: 100 INJECTION, SOLUTION SUBCUTANEOUS at 20:44

## 2019-10-10 RX ADMIN — INSULIN LISPRO 8 UNITS: 100 INJECTION, SOLUTION INTRAVENOUS; SUBCUTANEOUS at 17:33

## 2019-10-10 RX ADMIN — BUTALBITAL, ACETAMINOPHEN AND CAFFEINE 1 TABLET: 50; 325; 40 TABLET ORAL at 10:56

## 2019-10-10 RX ADMIN — FAMOTIDINE 20 MG: 10 INJECTION, SOLUTION INTRAVENOUS at 13:12

## 2019-10-10 RX ADMIN — SODIUM CHLORIDE: 9 INJECTION, SOLUTION INTRAVENOUS at 10:55

## 2019-10-10 RX ADMIN — METOPROLOL TARTRATE 25 MG: 25 TABLET ORAL at 08:23

## 2019-10-10 RX ADMIN — CYCLOBENZAPRINE HYDROCHLORIDE 10 MG: 10 TABLET, FILM COATED ORAL at 13:09

## 2019-10-10 RX ADMIN — INSULIN LISPRO 4 UNITS: 100 INJECTION, SOLUTION INTRAVENOUS; SUBCUTANEOUS at 08:17

## 2019-10-10 RX ADMIN — OXYCODONE HYDROCHLORIDE AND ACETAMINOPHEN 1 TABLET: 5; 325 TABLET ORAL at 21:03

## 2019-10-10 RX ADMIN — DARBEPOETIN ALFA 100 MCG: 100 INJECTION, SOLUTION INTRAVENOUS; SUBCUTANEOUS at 11:05

## 2019-10-10 RX ADMIN — DILTIAZEM HYDROCHLORIDE 240 MG: 240 CAPSULE, COATED, EXTENDED RELEASE ORAL at 10:56

## 2019-10-10 RX ADMIN — METOPROLOL TARTRATE 25 MG: 25 TABLET ORAL at 20:44

## 2019-10-10 RX ADMIN — METHYLPREDNISOLONE SODIUM SUCCINATE 40 MG: 40 INJECTION, POWDER, FOR SOLUTION INTRAMUSCULAR; INTRAVENOUS at 20:44

## 2019-10-10 RX ADMIN — PATIROMER 8.4 G: 8.4 POWDER, FOR SUSPENSION ORAL at 11:05

## 2019-10-10 RX ADMIN — ONDANSETRON 4 MG: 2 INJECTION INTRAMUSCULAR; INTRAVENOUS at 17:03

## 2019-10-10 RX ADMIN — SODIUM CHLORIDE 125 MG: 9 INJECTION, SOLUTION INTRAVENOUS at 16:50

## 2019-10-10 RX ADMIN — Medication 10 ML: at 13:12

## 2019-10-10 RX ADMIN — Medication 10 ML: at 20:44

## 2019-10-10 RX ADMIN — INSULIN LISPRO 6 UNITS: 100 INJECTION, SOLUTION INTRAVENOUS; SUBCUTANEOUS at 20:44

## 2019-10-10 ASSESSMENT — PAIN SCALES - GENERAL
PAINLEVEL_OUTOF10: 5
PAINLEVEL_OUTOF10: 8
PAINLEVEL_OUTOF10: 8
PAINLEVEL_OUTOF10: 3
PAINLEVEL_OUTOF10: 4
PAINLEVEL_OUTOF10: 9
PAINLEVEL_OUTOF10: 8
PAINLEVEL_OUTOF10: 6
PAINLEVEL_OUTOF10: 3

## 2019-10-10 ASSESSMENT — PAIN DESCRIPTION - PAIN TYPE
TYPE: ACUTE PAIN
TYPE: ACUTE PAIN
TYPE_2: ACUTE PAIN
TYPE_2: ACUTE PAIN
TYPE: ACUTE PAIN

## 2019-10-10 ASSESSMENT — PAIN DESCRIPTION - DESCRIPTORS
DESCRIPTORS: HEADACHE
DESCRIPTORS: HEADACHE

## 2019-10-10 ASSESSMENT — PAIN DESCRIPTION - LOCATION
LOCATION: HEAD
LOCATION_2: BACK
LOCATION: HEAD
LOCATION_2: BACK
LOCATION: HEAD

## 2019-10-10 ASSESSMENT — PAIN DESCRIPTION - ONSET: ONSET: ON-GOING

## 2019-10-10 ASSESSMENT — PAIN DESCRIPTION - ORIENTATION
ORIENTATION: RIGHT;LEFT
ORIENTATION: RIGHT;LEFT

## 2019-10-10 ASSESSMENT — PAIN DESCRIPTION - INTENSITY
RATING_2: 8
RATING_2: 9

## 2019-10-10 ASSESSMENT — PAIN DESCRIPTION - PROGRESSION: CLINICAL_PROGRESSION: GRADUALLY IMPROVING

## 2019-10-10 ASSESSMENT — PAIN DESCRIPTION - FREQUENCY: FREQUENCY: CONTINUOUS

## 2019-10-11 ENCOUNTER — ANESTHESIA (OUTPATIENT)
Dept: ENDOSCOPY | Age: 29
DRG: 420 | End: 2019-10-11
Payer: COMMERCIAL

## 2019-10-11 VITALS — DIASTOLIC BLOOD PRESSURE: 96 MMHG | SYSTOLIC BLOOD PRESSURE: 163 MMHG | OXYGEN SATURATION: 96 %

## 2019-10-11 LAB
ANION GAP SERPL CALCULATED.3IONS-SCNC: 8 MMOL/L (ref 7–16)
BASOPHILS ABSOLUTE: 0.04 E9/L (ref 0–0.2)
BASOPHILS RELATIVE PERCENT: 0.5 % (ref 0–2)
BUN BLDV-MCNC: 48 MG/DL (ref 6–20)
CALCIUM SERPL-MCNC: 9.1 MG/DL (ref 8.6–10.2)
CATECHOLAMINE INTERPRETATION, PLASMA: ABNORMAL
CHLORIDE BLD-SCNC: 108 MMOL/L (ref 98–107)
CO2: 22 MMOL/L (ref 22–29)
CREAT SERPL-MCNC: 2.9 MG/DL (ref 0.5–1)
DOPAMINE PLASMA: 65 PG/ML (ref 0–20)
EOSINOPHILS ABSOLUTE: 0 E9/L (ref 0.05–0.5)
EOSINOPHILS RELATIVE PERCENT: 0 % (ref 0–6)
EPINEPHRINE PLASMA: 12 PG/ML (ref 10–200)
GFR AFRICAN AMERICAN: 23
GFR NON-AFRICAN AMERICAN: 23 ML/MIN/1.73
GLUCOSE BLD-MCNC: 245 MG/DL (ref 74–99)
HCG(URINE) PREGNANCY TEST: NEGATIVE
HCT VFR BLD CALC: 24.5 % (ref 34–48)
HEMOGLOBIN: 7.9 G/DL (ref 11.5–15.5)
IMMATURE GRANULOCYTES #: 0.09 E9/L
IMMATURE GRANULOCYTES %: 1 % (ref 0–5)
LYMPHOCYTES ABSOLUTE: 0.84 E9/L (ref 1.5–4)
LYMPHOCYTES RELATIVE PERCENT: 9.8 % (ref 20–42)
MCH RBC QN AUTO: 30.2 PG (ref 26–35)
MCHC RBC AUTO-ENTMCNC: 32.2 % (ref 32–34.5)
MCV RBC AUTO: 93.5 FL (ref 80–99.9)
METER GLUCOSE: 244 MG/DL (ref 74–99)
METER GLUCOSE: 301 MG/DL (ref 74–99)
METER GLUCOSE: 350 MG/DL (ref 74–99)
METER GLUCOSE: 370 MG/DL (ref 74–99)
METER GLUCOSE: 457 MG/DL (ref 74–99)
METER GLUCOSE: 461 MG/DL (ref 74–99)
MONOCYTES ABSOLUTE: 0.11 E9/L (ref 0.1–0.95)
MONOCYTES RELATIVE PERCENT: 1.3 % (ref 2–12)
NEUTROPHILS ABSOLUTE: 7.52 E9/L (ref 1.8–7.3)
NEUTROPHILS RELATIVE PERCENT: 87.4 % (ref 43–80)
NOREPINEPHRINE: 918 PG/ML (ref 80–520)
PDW BLD-RTO: 15.5 FL (ref 11.5–15)
PLATELET # BLD: 320 E9/L (ref 130–450)
PMV BLD AUTO: 10.1 FL (ref 7–12)
POTASSIUM SERPL-SCNC: 5.6 MMOL/L (ref 3.5–5)
POTASSIUM SERPL-SCNC: 5.6 MMOL/L (ref 3.5–5)
RBC # BLD: 2.62 E12/L (ref 3.5–5.5)
SODIUM BLD-SCNC: 138 MMOL/L (ref 132–146)
WBC # BLD: 8.6 E9/L (ref 4.5–11.5)

## 2019-10-11 PROCEDURE — 6360000002 HC RX W HCPCS: Performed by: NURSE ANESTHETIST, CERTIFIED REGISTERED

## 2019-10-11 PROCEDURE — 99232 SBSQ HOSP IP/OBS MODERATE 35: CPT | Performed by: INTERNAL MEDICINE

## 2019-10-11 PROCEDURE — 2580000003 HC RX 258: Performed by: NURSE ANESTHETIST, CERTIFIED REGISTERED

## 2019-10-11 PROCEDURE — 6370000000 HC RX 637 (ALT 250 FOR IP): Performed by: INTERNAL MEDICINE

## 2019-10-11 PROCEDURE — 2580000003 HC RX 258: Performed by: NURSE PRACTITIONER

## 2019-10-11 PROCEDURE — 7100000011 HC PHASE II RECOVERY - ADDTL 15 MIN: Performed by: INTERNAL MEDICINE

## 2019-10-11 PROCEDURE — 2709999900 HC NON-CHARGEABLE SUPPLY: Performed by: INTERNAL MEDICINE

## 2019-10-11 PROCEDURE — 6360000002 HC RX W HCPCS: Performed by: ANESTHESIOLOGY

## 2019-10-11 PROCEDURE — 36415 COLL VENOUS BLD VENIPUNCTURE: CPT

## 2019-10-11 PROCEDURE — 7100000010 HC PHASE II RECOVERY - FIRST 15 MIN: Performed by: INTERNAL MEDICINE

## 2019-10-11 PROCEDURE — 85025 COMPLETE CBC W/AUTO DIFF WBC: CPT

## 2019-10-11 PROCEDURE — 0DJ08ZZ INSPECTION OF UPPER INTESTINAL TRACT, VIA NATURAL OR ARTIFICIAL OPENING ENDOSCOPIC: ICD-10-PCS | Performed by: INTERNAL MEDICINE

## 2019-10-11 PROCEDURE — 80048 BASIC METABOLIC PNL TOTAL CA: CPT

## 2019-10-11 PROCEDURE — 84132 ASSAY OF SERUM POTASSIUM: CPT

## 2019-10-11 PROCEDURE — 2580000003 HC RX 258: Performed by: INTERNAL MEDICINE

## 2019-10-11 PROCEDURE — 82962 GLUCOSE BLOOD TEST: CPT

## 2019-10-11 PROCEDURE — 36592 COLLECT BLOOD FROM PICC: CPT

## 2019-10-11 PROCEDURE — 3700000000 HC ANESTHESIA ATTENDED CARE: Performed by: INTERNAL MEDICINE

## 2019-10-11 PROCEDURE — APPSS30 APP SPLIT SHARED TIME 16-30 MINUTES: Performed by: NURSE PRACTITIONER

## 2019-10-11 PROCEDURE — 3609017100 HC EGD: Performed by: INTERNAL MEDICINE

## 2019-10-11 PROCEDURE — 1200000000 HC SEMI PRIVATE

## 2019-10-11 PROCEDURE — 81025 URINE PREGNANCY TEST: CPT

## 2019-10-11 PROCEDURE — 2500000003 HC RX 250 WO HCPCS: Performed by: HOSPITALIST

## 2019-10-11 PROCEDURE — 6370000000 HC RX 637 (ALT 250 FOR IP): Performed by: NURSE PRACTITIONER

## 2019-10-11 PROCEDURE — 6360000002 HC RX W HCPCS: Performed by: INTERNAL MEDICINE

## 2019-10-11 RX ORDER — PROPOFOL 10 MG/ML
INJECTION, EMULSION INTRAVENOUS PRN
Status: DISCONTINUED | OUTPATIENT
Start: 2019-10-11 | End: 2019-10-11 | Stop reason: SDUPTHER

## 2019-10-11 RX ORDER — MEPERIDINE HYDROCHLORIDE 50 MG/ML
25 INJECTION INTRAMUSCULAR; INTRAVENOUS; SUBCUTANEOUS ONCE
Status: COMPLETED | OUTPATIENT
Start: 2019-10-11 | End: 2019-10-11

## 2019-10-11 RX ORDER — SODIUM CHLORIDE 9 MG/ML
INJECTION, SOLUTION INTRAVENOUS CONTINUOUS PRN
Status: DISCONTINUED | OUTPATIENT
Start: 2019-10-11 | End: 2019-10-11 | Stop reason: SDUPTHER

## 2019-10-11 RX ORDER — MEPERIDINE HYDROCHLORIDE 25 MG/ML
INJECTION INTRAMUSCULAR; INTRAVENOUS; SUBCUTANEOUS
Status: DISPENSED
Start: 2019-10-11 | End: 2019-10-12

## 2019-10-11 RX ADMIN — CYCLOBENZAPRINE HYDROCHLORIDE 10 MG: 10 TABLET, FILM COATED ORAL at 20:14

## 2019-10-11 RX ADMIN — INSULIN LISPRO 6 UNITS: 100 INJECTION, SOLUTION INTRAVENOUS; SUBCUTANEOUS at 21:23

## 2019-10-11 RX ADMIN — SODIUM CHLORIDE: 9 INJECTION, SOLUTION INTRAVENOUS at 11:21

## 2019-10-11 RX ADMIN — AMITRIPTYLINE HYDROCHLORIDE 10 MG: 10 TABLET, FILM COATED ORAL at 22:53

## 2019-10-11 RX ADMIN — METOPROLOL TARTRATE 25 MG: 25 TABLET ORAL at 09:00

## 2019-10-11 RX ADMIN — OXYCODONE HYDROCHLORIDE AND ACETAMINOPHEN 1 TABLET: 5; 325 TABLET ORAL at 21:33

## 2019-10-11 RX ADMIN — OXYCODONE HYDROCHLORIDE AND ACETAMINOPHEN 1 TABLET: 5; 325 TABLET ORAL at 17:14

## 2019-10-11 RX ADMIN — PATIROMER 8.4 G: 8.4 POWDER, FOR SUSPENSION ORAL at 15:53

## 2019-10-11 RX ADMIN — INSULIN GLARGINE 24 UNITS: 100 INJECTION, SOLUTION SUBCUTANEOUS at 21:23

## 2019-10-11 RX ADMIN — METOPROLOL TARTRATE 25 MG: 25 TABLET ORAL at 21:22

## 2019-10-11 RX ADMIN — Medication 10 ML: at 21:29

## 2019-10-11 RX ADMIN — METHYLPREDNISOLONE SODIUM SUCCINATE 40 MG: 40 INJECTION, POWDER, FOR SOLUTION INTRAMUSCULAR; INTRAVENOUS at 19:00

## 2019-10-11 RX ADMIN — METHYLPREDNISOLONE SODIUM SUCCINATE 40 MG: 40 INJECTION, POWDER, FOR SOLUTION INTRAMUSCULAR; INTRAVENOUS at 02:58

## 2019-10-11 RX ADMIN — DILTIAZEM HYDROCHLORIDE 240 MG: 240 CAPSULE, COATED, EXTENDED RELEASE ORAL at 11:19

## 2019-10-11 RX ADMIN — METHYLPREDNISOLONE SODIUM SUCCINATE 40 MG: 40 INJECTION, POWDER, FOR SOLUTION INTRAMUSCULAR; INTRAVENOUS at 11:00

## 2019-10-11 RX ADMIN — PROPOFOL 30 MG: 10 INJECTION, EMULSION INTRAVENOUS at 13:45

## 2019-10-11 RX ADMIN — MEPERIDINE HYDROCHLORIDE 25 MG: 50 INJECTION, SOLUTION INTRAMUSCULAR; INTRAVENOUS; SUBCUTANEOUS at 14:32

## 2019-10-11 RX ADMIN — HYDROXYZINE PAMOATE 25 MG: 25 CAPSULE ORAL at 20:13

## 2019-10-11 RX ADMIN — SODIUM CHLORIDE: 9 INJECTION, SOLUTION INTRAVENOUS at 13:38

## 2019-10-11 RX ADMIN — FAMOTIDINE 20 MG: 10 INJECTION, SOLUTION INTRAVENOUS at 10:00

## 2019-10-11 RX ADMIN — OXYCODONE HYDROCHLORIDE AND ACETAMINOPHEN 1 TABLET: 5; 325 TABLET ORAL at 08:04

## 2019-10-11 RX ADMIN — FAMOTIDINE 20 MG: 10 INJECTION, SOLUTION INTRAVENOUS at 21:22

## 2019-10-11 RX ADMIN — PROPOFOL 120 MG: 10 INJECTION, EMULSION INTRAVENOUS at 13:43

## 2019-10-11 RX ADMIN — INSULIN LISPRO 10 UNITS: 100 INJECTION, SOLUTION INTRAVENOUS; SUBCUTANEOUS at 15:28

## 2019-10-11 RX ADMIN — BUTALBITAL, ACETAMINOPHEN AND CAFFEINE 1 TABLET: 50; 325; 40 TABLET ORAL at 20:09

## 2019-10-11 ASSESSMENT — PAIN SCALES - GENERAL
PAINLEVEL_OUTOF10: 5
PAINLEVEL_OUTOF10: 10
PAINLEVEL_OUTOF10: 0
PAINLEVEL_OUTOF10: 7
PAINLEVEL_OUTOF10: 8
PAINLEVEL_OUTOF10: 0
PAINLEVEL_OUTOF10: 3
PAINLEVEL_OUTOF10: 10
PAINLEVEL_OUTOF10: 1
PAINLEVEL_OUTOF10: 3
PAINLEVEL_OUTOF10: 8
PAINLEVEL_OUTOF10: 10
PAINLEVEL_OUTOF10: 7

## 2019-10-11 ASSESSMENT — PAIN DESCRIPTION - LOCATION
LOCATION: BACK
LOCATION: BACK
LOCATION: COCCYX;HEAD

## 2019-10-11 ASSESSMENT — PAIN DESCRIPTION - DESCRIPTORS
DESCRIPTORS: ACHING
DESCRIPTORS: ACHING;DISCOMFORT;DULL;HEADACHE
DESCRIPTORS: ACHING;CONSTANT

## 2019-10-11 ASSESSMENT — PAIN - FUNCTIONAL ASSESSMENT
PAIN_FUNCTIONAL_ASSESSMENT: PREVENTS OR INTERFERES SOME ACTIVE ACTIVITIES AND ADLS
PAIN_FUNCTIONAL_ASSESSMENT: PREVENTS OR INTERFERES SOME ACTIVE ACTIVITIES AND ADLS

## 2019-10-11 ASSESSMENT — PAIN DESCRIPTION - PAIN TYPE
TYPE: ACUTE PAIN

## 2019-10-11 ASSESSMENT — PAIN DESCRIPTION - ORIENTATION: ORIENTATION: RIGHT;LEFT

## 2019-10-11 ASSESSMENT — PAIN DESCRIPTION - PROGRESSION: CLINICAL_PROGRESSION: GRADUALLY WORSENING

## 2019-10-11 ASSESSMENT — PAIN DESCRIPTION - ONSET: ONSET: ON-GOING

## 2019-10-12 ENCOUNTER — APPOINTMENT (OUTPATIENT)
Dept: NUCLEAR MEDICINE | Age: 29
DRG: 420 | End: 2019-10-12
Payer: COMMERCIAL

## 2019-10-12 LAB
ANION GAP SERPL CALCULATED.3IONS-SCNC: 10 MMOL/L (ref 7–16)
ANION GAP SERPL CALCULATED.3IONS-SCNC: 9 MMOL/L (ref 7–16)
BASOPHILS ABSOLUTE: 0.01 E9/L (ref 0–0.2)
BASOPHILS RELATIVE PERCENT: 0.1 % (ref 0–2)
BUN BLDV-MCNC: 54 MG/DL (ref 6–20)
BUN BLDV-MCNC: 55 MG/DL (ref 6–20)
CALCIUM SERPL-MCNC: 9 MG/DL (ref 8.6–10.2)
CALCIUM SERPL-MCNC: 9.2 MG/DL (ref 8.6–10.2)
CHLORIDE BLD-SCNC: 108 MMOL/L (ref 98–107)
CHLORIDE BLD-SCNC: 108 MMOL/L (ref 98–107)
CO2: 21 MMOL/L (ref 22–29)
CO2: 21 MMOL/L (ref 22–29)
CREAT SERPL-MCNC: 2.9 MG/DL (ref 0.5–1)
CREAT SERPL-MCNC: 3 MG/DL (ref 0.5–1)
EOSINOPHILS ABSOLUTE: 0 E9/L (ref 0.05–0.5)
EOSINOPHILS RELATIVE PERCENT: 0 % (ref 0–6)
GFR AFRICAN AMERICAN: 23
GFR AFRICAN AMERICAN: 23
GFR NON-AFRICAN AMERICAN: 23 ML/MIN/1.73
GFR NON-AFRICAN AMERICAN: 23 ML/MIN/1.73
GLUCOSE BLD-MCNC: 198 MG/DL (ref 74–99)
GLUCOSE BLD-MCNC: 220 MG/DL (ref 74–99)
HCT VFR BLD CALC: 24.6 % (ref 34–48)
HEMOGLOBIN: 7.9 G/DL (ref 11.5–15.5)
IMMATURE GRANULOCYTES #: 0.14 E9/L
IMMATURE GRANULOCYTES %: 1.1 % (ref 0–5)
LYMPHOCYTES ABSOLUTE: 0.76 E9/L (ref 1.5–4)
LYMPHOCYTES RELATIVE PERCENT: 5.7 % (ref 20–42)
MCH RBC QN AUTO: 30.4 PG (ref 26–35)
MCHC RBC AUTO-ENTMCNC: 32.1 % (ref 32–34.5)
MCV RBC AUTO: 94.6 FL (ref 80–99.9)
METER GLUCOSE: 114 MG/DL (ref 74–99)
METER GLUCOSE: 127 MG/DL (ref 74–99)
METER GLUCOSE: 189 MG/DL (ref 74–99)
METER GLUCOSE: 227 MG/DL (ref 74–99)
METER GLUCOSE: 290 MG/DL (ref 74–99)
METER GLUCOSE: 391 MG/DL (ref 74–99)
METER GLUCOSE: 426 MG/DL (ref 74–99)
METER GLUCOSE: 458 MG/DL (ref 74–99)
MONOCYTES ABSOLUTE: 0.41 E9/L (ref 0.1–0.95)
MONOCYTES RELATIVE PERCENT: 3.1 % (ref 2–12)
NEUTROPHILS ABSOLUTE: 12 E9/L (ref 1.8–7.3)
NEUTROPHILS RELATIVE PERCENT: 90 % (ref 43–80)
PDW BLD-RTO: 15.8 FL (ref 11.5–15)
PLATELET # BLD: 353 E9/L (ref 130–450)
PMV BLD AUTO: 10.2 FL (ref 7–12)
POTASSIUM SERPL-SCNC: 4.7 MMOL/L (ref 3.5–5)
POTASSIUM SERPL-SCNC: 5.8 MMOL/L (ref 3.5–5)
RBC # BLD: 2.6 E12/L (ref 3.5–5.5)
SODIUM BLD-SCNC: 138 MMOL/L (ref 132–146)
SODIUM BLD-SCNC: 139 MMOL/L (ref 132–146)
WBC # BLD: 13.3 E9/L (ref 4.5–11.5)

## 2019-10-12 PROCEDURE — 6370000000 HC RX 637 (ALT 250 FOR IP): Performed by: INTERNAL MEDICINE

## 2019-10-12 PROCEDURE — 6360000002 HC RX W HCPCS: Performed by: INTERNAL MEDICINE

## 2019-10-12 PROCEDURE — 2500000003 HC RX 250 WO HCPCS: Performed by: NURSE PRACTITIONER

## 2019-10-12 PROCEDURE — 82962 GLUCOSE BLOOD TEST: CPT

## 2019-10-12 PROCEDURE — 99232 SBSQ HOSP IP/OBS MODERATE 35: CPT | Performed by: INTERNAL MEDICINE

## 2019-10-12 PROCEDURE — APPSS30 APP SPLIT SHARED TIME 16-30 MINUTES: Performed by: NURSE PRACTITIONER

## 2019-10-12 PROCEDURE — 2580000003 HC RX 258: Performed by: INTERNAL MEDICINE

## 2019-10-12 PROCEDURE — 36591 DRAW BLOOD OFF VENOUS DEVICE: CPT

## 2019-10-12 PROCEDURE — 6370000000 HC RX 637 (ALT 250 FOR IP): Performed by: NURSE PRACTITIONER

## 2019-10-12 PROCEDURE — 36415 COLL VENOUS BLD VENIPUNCTURE: CPT

## 2019-10-12 PROCEDURE — 2500000003 HC RX 250 WO HCPCS: Performed by: HOSPITALIST

## 2019-10-12 PROCEDURE — A9541 TC99M SULFUR COLLOID: HCPCS | Performed by: RADIOLOGY

## 2019-10-12 PROCEDURE — 80048 BASIC METABOLIC PNL TOTAL CA: CPT

## 2019-10-12 PROCEDURE — 3430000000 HC RX DIAGNOSTIC RADIOPHARMACEUTICAL: Performed by: RADIOLOGY

## 2019-10-12 PROCEDURE — 85025 COMPLETE CBC W/AUTO DIFF WBC: CPT

## 2019-10-12 PROCEDURE — 1200000000 HC SEMI PRIVATE

## 2019-10-12 PROCEDURE — 78264 GASTRIC EMPTYING IMG STUDY: CPT

## 2019-10-12 RX ORDER — SENNA PLUS 8.6 MG/1
2 TABLET ORAL NIGHTLY PRN
Status: DISCONTINUED | OUTPATIENT
Start: 2019-10-12 | End: 2019-10-17 | Stop reason: HOSPADM

## 2019-10-12 RX ORDER — ERYTHROMYCIN 250 MG/1
1 TABLET, DELAYED RELEASE ORAL EVERY 6 HOURS
Status: DISCONTINUED | OUTPATIENT
Start: 2019-10-12 | End: 2019-10-13 | Stop reason: SDUPTHER

## 2019-10-12 RX ORDER — METOPROLOL TARTRATE 5 MG/5ML
5 INJECTION INTRAVENOUS ONCE
Status: COMPLETED | OUTPATIENT
Start: 2019-10-12 | End: 2019-10-12

## 2019-10-12 RX ORDER — ERYTHROMYCIN 250 MG/1
250 TABLET, DELAYED RELEASE ORAL EVERY 6 HOURS
Status: DISCONTINUED | OUTPATIENT
Start: 2019-10-12 | End: 2019-10-12 | Stop reason: CLARIF

## 2019-10-12 RX ORDER — POLYETHYLENE GLYCOL 3350 17 G/17G
17 POWDER, FOR SOLUTION ORAL DAILY
Status: DISCONTINUED | OUTPATIENT
Start: 2019-10-12 | End: 2019-10-17 | Stop reason: HOSPADM

## 2019-10-12 RX ORDER — SODIUM POLYSTYRENE SULFONATE 15 G/60ML
15 SUSPENSION ORAL; RECTAL ONCE
Status: COMPLETED | OUTPATIENT
Start: 2019-10-12 | End: 2019-10-12

## 2019-10-12 RX ORDER — ERYTHROMYCIN 250 MG/1
250 TABLET, COATED ORAL EVERY 6 HOURS
Status: DISCONTINUED | OUTPATIENT
Start: 2019-10-12 | End: 2019-10-12 | Stop reason: CLARIF

## 2019-10-12 RX ADMIN — DILTIAZEM HYDROCHLORIDE 240 MG: 240 CAPSULE, COATED, EXTENDED RELEASE ORAL at 12:20

## 2019-10-12 RX ADMIN — BUTALBITAL, ACETAMINOPHEN AND CAFFEINE 1 TABLET: 50; 325; 40 TABLET ORAL at 23:24

## 2019-10-12 RX ADMIN — SODIUM POLYSTYRENE SULFONATE 15 G: 15 SUSPENSION ORAL; RECTAL at 16:43

## 2019-10-12 RX ADMIN — AMITRIPTYLINE HYDROCHLORIDE 10 MG: 10 TABLET, FILM COATED ORAL at 20:35

## 2019-10-12 RX ADMIN — HYDROXYZINE PAMOATE 25 MG: 25 CAPSULE ORAL at 20:35

## 2019-10-12 RX ADMIN — INSULIN LISPRO 2 UNITS: 100 INJECTION, SOLUTION INTRAVENOUS; SUBCUTANEOUS at 20:36

## 2019-10-12 RX ADMIN — METHYLPREDNISOLONE SODIUM SUCCINATE 40 MG: 40 INJECTION, POWDER, FOR SOLUTION INTRAMUSCULAR; INTRAVENOUS at 12:46

## 2019-10-12 RX ADMIN — FAMOTIDINE 20 MG: 10 INJECTION, SOLUTION INTRAVENOUS at 12:20

## 2019-10-12 RX ADMIN — Medication 2 MILLICURIE: at 09:57

## 2019-10-12 RX ADMIN — INSULIN GLARGINE 24 UNITS: 100 INJECTION, SOLUTION SUBCUTANEOUS at 20:36

## 2019-10-12 RX ADMIN — Medication 10 ML: at 20:35

## 2019-10-12 RX ADMIN — CYCLOBENZAPRINE HYDROCHLORIDE 10 MG: 10 TABLET, FILM COATED ORAL at 18:07

## 2019-10-12 RX ADMIN — METOPROLOL TARTRATE 25 MG: 25 TABLET ORAL at 20:35

## 2019-10-12 RX ADMIN — ERYTHROMYCIN 1 TABLET: 250 TABLET, DELAYED RELEASE ORAL at 20:34

## 2019-10-12 RX ADMIN — POLYETHYLENE GLYCOL 3350 17 G: 17 POWDER, FOR SOLUTION ORAL at 14:08

## 2019-10-12 RX ADMIN — FAMOTIDINE 20 MG: 10 INJECTION, SOLUTION INTRAVENOUS at 20:35

## 2019-10-12 RX ADMIN — INSULIN LISPRO 10 UNITS: 100 INJECTION, SOLUTION INTRAVENOUS; SUBCUTANEOUS at 00:55

## 2019-10-12 RX ADMIN — OXYCODONE HYDROCHLORIDE AND ACETAMINOPHEN 1 TABLET: 5; 325 TABLET ORAL at 22:14

## 2019-10-12 RX ADMIN — ACETAMINOPHEN 650 MG: 325 TABLET, FILM COATED ORAL at 20:35

## 2019-10-12 RX ADMIN — OXYCODONE HYDROCHLORIDE AND ACETAMINOPHEN 1 TABLET: 5; 325 TABLET ORAL at 12:19

## 2019-10-12 RX ADMIN — OXYCODONE HYDROCHLORIDE AND ACETAMINOPHEN 1 TABLET: 5; 325 TABLET ORAL at 18:07

## 2019-10-12 RX ADMIN — METHYLPREDNISOLONE SODIUM SUCCINATE 40 MG: 40 INJECTION, POWDER, FOR SOLUTION INTRAMUSCULAR; INTRAVENOUS at 03:57

## 2019-10-12 RX ADMIN — METHYLPREDNISOLONE SODIUM SUCCINATE 40 MG: 40 INJECTION, POWDER, FOR SOLUTION INTRAMUSCULAR; INTRAVENOUS at 19:12

## 2019-10-12 RX ADMIN — METOPROLOL TARTRATE 5 MG: 5 INJECTION, SOLUTION INTRAVENOUS at 22:14

## 2019-10-12 RX ADMIN — METOPROLOL TARTRATE 25 MG: 25 TABLET ORAL at 12:47

## 2019-10-12 RX ADMIN — ERYTHROMYCIN 1 TABLET: 250 TABLET, DELAYED RELEASE ORAL at 16:42

## 2019-10-12 ASSESSMENT — PAIN DESCRIPTION - LOCATION
LOCATION: BACK;HEAD
LOCATION: BACK;HEAD;NECK

## 2019-10-12 ASSESSMENT — PAIN DESCRIPTION - DESCRIPTORS
DESCRIPTORS: ACHING
DESCRIPTORS: ACHING;DISCOMFORT;SORE;TENDER

## 2019-10-12 ASSESSMENT — PAIN SCALES - GENERAL
PAINLEVEL_OUTOF10: 8
PAINLEVEL_OUTOF10: 10
PAINLEVEL_OUTOF10: 6
PAINLEVEL_OUTOF10: 10
PAINLEVEL_OUTOF10: 8
PAINLEVEL_OUTOF10: 4
PAINLEVEL_OUTOF10: 7
PAINLEVEL_OUTOF10: 10
PAINLEVEL_OUTOF10: 10
PAINLEVEL_OUTOF10: 8
PAINLEVEL_OUTOF10: 10

## 2019-10-12 ASSESSMENT — PAIN DESCRIPTION - PROGRESSION: CLINICAL_PROGRESSION: GRADUALLY IMPROVING

## 2019-10-12 ASSESSMENT — PAIN DESCRIPTION - PAIN TYPE
TYPE: ACUTE PAIN
TYPE: ACUTE PAIN

## 2019-10-13 LAB
ALBUMIN SERPL-MCNC: 3.4 G/DL (ref 3.5–5.2)
ALP BLD-CCNC: 331 U/L (ref 35–104)
ALT SERPL-CCNC: 132 U/L (ref 0–32)
ANION GAP SERPL CALCULATED.3IONS-SCNC: 10 MMOL/L (ref 7–16)
ANISOCYTOSIS: ABNORMAL
AST SERPL-CCNC: 94 U/L (ref 0–31)
BASOPHILIC STIPPLING: ABNORMAL
BASOPHILS ABSOLUTE: 0 E9/L (ref 0–0.2)
BASOPHILS RELATIVE PERCENT: 0 % (ref 0–2)
BILIRUB SERPL-MCNC: <0.2 MG/DL (ref 0–1.2)
BUN BLDV-MCNC: 57 MG/DL (ref 6–20)
BURR CELLS: ABNORMAL
CALCIUM SERPL-MCNC: 8.7 MG/DL (ref 8.6–10.2)
CHLORIDE BLD-SCNC: 107 MMOL/L (ref 98–107)
CO2: 21 MMOL/L (ref 22–29)
CREAT SERPL-MCNC: 3.1 MG/DL (ref 0.5–1)
EOSINOPHILS ABSOLUTE: 0 E9/L (ref 0.05–0.5)
EOSINOPHILS RELATIVE PERCENT: 0 % (ref 0–6)
GFR AFRICAN AMERICAN: 22
GFR NON-AFRICAN AMERICAN: 22 ML/MIN/1.73
GLUCOSE BLD-MCNC: 311 MG/DL (ref 74–99)
HCT VFR BLD CALC: 24.6 % (ref 34–48)
HEMOGLOBIN: 7.8 G/DL (ref 11.5–15.5)
LYMPHOCYTES ABSOLUTE: 0.12 E9/L (ref 1.5–4)
LYMPHOCYTES RELATIVE PERCENT: 1 % (ref 20–42)
MCH RBC QN AUTO: 29.9 PG (ref 26–35)
MCHC RBC AUTO-ENTMCNC: 31.7 % (ref 32–34.5)
MCV RBC AUTO: 94.3 FL (ref 80–99.9)
METER GLUCOSE: 191 MG/DL (ref 74–99)
METER GLUCOSE: 206 MG/DL (ref 74–99)
METER GLUCOSE: 312 MG/DL (ref 74–99)
METER GLUCOSE: 433 MG/DL (ref 74–99)
MONOCYTES ABSOLUTE: 0.35 E9/L (ref 0.1–0.95)
MONOCYTES RELATIVE PERCENT: 3 % (ref 2–12)
NEUTROPHILS ABSOLUTE: 11.33 E9/L (ref 1.8–7.3)
NEUTROPHILS RELATIVE PERCENT: 96 % (ref 43–80)
OVALOCYTES: ABNORMAL
PDW BLD-RTO: 16 FL (ref 11.5–15)
PLATELET # BLD: 381 E9/L (ref 130–450)
PMV BLD AUTO: 10.3 FL (ref 7–12)
POIKILOCYTES: ABNORMAL
POLYCHROMASIA: ABNORMAL
POTASSIUM SERPL-SCNC: 5 MMOL/L (ref 3.5–5)
RBC # BLD: 2.61 E12/L (ref 3.5–5.5)
SODIUM BLD-SCNC: 138 MMOL/L (ref 132–146)
TOTAL PROTEIN: 5.7 G/DL (ref 6.4–8.3)
WBC # BLD: 11.8 E9/L (ref 4.5–11.5)

## 2019-10-13 PROCEDURE — 80053 COMPREHEN METABOLIC PANEL: CPT

## 2019-10-13 PROCEDURE — 85025 COMPLETE CBC W/AUTO DIFF WBC: CPT

## 2019-10-13 PROCEDURE — 2580000003 HC RX 258: Performed by: INTERNAL MEDICINE

## 2019-10-13 PROCEDURE — 6370000000 HC RX 637 (ALT 250 FOR IP): Performed by: INTERNAL MEDICINE

## 2019-10-13 PROCEDURE — 2500000003 HC RX 250 WO HCPCS: Performed by: HOSPITALIST

## 2019-10-13 PROCEDURE — 99233 SBSQ HOSP IP/OBS HIGH 50: CPT | Performed by: INTERNAL MEDICINE

## 2019-10-13 PROCEDURE — 36415 COLL VENOUS BLD VENIPUNCTURE: CPT

## 2019-10-13 PROCEDURE — 82962 GLUCOSE BLOOD TEST: CPT

## 2019-10-13 PROCEDURE — 6360000002 HC RX W HCPCS: Performed by: INTERNAL MEDICINE

## 2019-10-13 PROCEDURE — 6370000000 HC RX 637 (ALT 250 FOR IP): Performed by: NURSE PRACTITIONER

## 2019-10-13 PROCEDURE — 1200000000 HC SEMI PRIVATE

## 2019-10-13 RX ORDER — OXYCODONE HYDROCHLORIDE AND ACETAMINOPHEN 5; 325 MG/1; MG/1
1 TABLET ORAL EVERY 4 HOURS PRN
Status: DISCONTINUED | OUTPATIENT
Start: 2019-10-13 | End: 2019-10-17 | Stop reason: HOSPADM

## 2019-10-13 RX ORDER — GUANFACINE 1 MG/1
1 TABLET ORAL 2 TIMES DAILY
Status: DISCONTINUED | OUTPATIENT
Start: 2019-10-13 | End: 2019-10-17 | Stop reason: HOSPADM

## 2019-10-13 RX ORDER — ERYTHROMYCIN 250 MG/1
250 TABLET, DELAYED RELEASE ORAL EVERY 6 HOURS
Status: DISCONTINUED | OUTPATIENT
Start: 2019-10-13 | End: 2019-10-13

## 2019-10-13 RX ORDER — OXYCODONE HYDROCHLORIDE AND ACETAMINOPHEN 5; 325 MG/1; MG/1
2 TABLET ORAL EVERY 4 HOURS PRN
Status: DISCONTINUED | OUTPATIENT
Start: 2019-10-13 | End: 2019-10-17 | Stop reason: HOSPADM

## 2019-10-13 RX ADMIN — OXYCODONE HYDROCHLORIDE AND ACETAMINOPHEN 1 TABLET: 5; 325 TABLET ORAL at 12:44

## 2019-10-13 RX ADMIN — ERYTHROMYCIN 250 MG: 250 TABLET, DELAYED RELEASE ORAL at 02:16

## 2019-10-13 RX ADMIN — HYDROXYZINE PAMOATE 25 MG: 25 CAPSULE ORAL at 23:47

## 2019-10-13 RX ADMIN — DILTIAZEM HYDROCHLORIDE 240 MG: 240 CAPSULE, COATED, EXTENDED RELEASE ORAL at 08:15

## 2019-10-13 RX ADMIN — INSULIN LISPRO 12 UNITS: 100 INJECTION, SOLUTION INTRAVENOUS; SUBCUTANEOUS at 08:08

## 2019-10-13 RX ADMIN — AMITRIPTYLINE HYDROCHLORIDE 10 MG: 10 TABLET, FILM COATED ORAL at 21:27

## 2019-10-13 RX ADMIN — CYCLOBENZAPRINE HYDROCHLORIDE 10 MG: 10 TABLET, FILM COATED ORAL at 08:15

## 2019-10-13 RX ADMIN — GUANFACINE 1 MG: 1 TABLET ORAL at 18:40

## 2019-10-13 RX ADMIN — METHYLPREDNISOLONE SODIUM SUCCINATE 40 MG: 40 INJECTION, POWDER, FOR SOLUTION INTRAMUSCULAR; INTRAVENOUS at 12:01

## 2019-10-13 RX ADMIN — ERYTHROMYCIN 250 MG: 250 TABLET, DELAYED RELEASE ORAL at 08:14

## 2019-10-13 RX ADMIN — METOPROLOL TARTRATE 25 MG: 25 TABLET ORAL at 21:27

## 2019-10-13 RX ADMIN — INSULIN LISPRO 4 UNITS: 100 INJECTION, SOLUTION INTRAVENOUS; SUBCUTANEOUS at 17:19

## 2019-10-13 RX ADMIN — METOPROLOL TARTRATE 25 MG: 25 TABLET ORAL at 08:07

## 2019-10-13 RX ADMIN — CYCLOBENZAPRINE HYDROCHLORIDE 10 MG: 10 TABLET, FILM COATED ORAL at 21:26

## 2019-10-13 RX ADMIN — METHYLPREDNISOLONE SODIUM SUCCINATE 40 MG: 40 INJECTION, POWDER, FOR SOLUTION INTRAMUSCULAR; INTRAVENOUS at 02:16

## 2019-10-13 RX ADMIN — INSULIN LISPRO 1 UNITS: 100 INJECTION, SOLUTION INTRAVENOUS; SUBCUTANEOUS at 21:30

## 2019-10-13 RX ADMIN — OXYCODONE HYDROCHLORIDE AND ACETAMINOPHEN 1 TABLET: 5; 325 TABLET ORAL at 17:18

## 2019-10-13 RX ADMIN — Medication 10 ML: at 21:27

## 2019-10-13 RX ADMIN — METHYLPREDNISOLONE SODIUM SUCCINATE 40 MG: 40 INJECTION, POWDER, FOR SOLUTION INTRAMUSCULAR; INTRAVENOUS at 18:40

## 2019-10-13 RX ADMIN — INSULIN LISPRO 8 UNITS: 100 INJECTION, SOLUTION INTRAVENOUS; SUBCUTANEOUS at 11:56

## 2019-10-13 RX ADMIN — Medication 10 ML: at 08:07

## 2019-10-13 RX ADMIN — HYDROXYZINE PAMOATE 25 MG: 25 CAPSULE ORAL at 12:01

## 2019-10-13 RX ADMIN — OXYCODONE HYDROCHLORIDE AND ACETAMINOPHEN 1 TABLET: 5; 325 TABLET ORAL at 08:15

## 2019-10-13 RX ADMIN — FAMOTIDINE 20 MG: 10 INJECTION, SOLUTION INTRAVENOUS at 21:27

## 2019-10-13 RX ADMIN — INSULIN GLARGINE 24 UNITS: 100 INJECTION, SOLUTION SUBCUTANEOUS at 21:30

## 2019-10-13 RX ADMIN — OXYCODONE HYDROCHLORIDE AND ACETAMINOPHEN 1 TABLET: 5; 325 TABLET ORAL at 02:16

## 2019-10-13 RX ADMIN — Medication 10 ML: at 21:28

## 2019-10-13 RX ADMIN — OXYCODONE HYDROCHLORIDE AND ACETAMINOPHEN 2 TABLET: 5; 325 TABLET ORAL at 21:27

## 2019-10-13 RX ADMIN — FAMOTIDINE 20 MG: 10 INJECTION, SOLUTION INTRAVENOUS at 08:07

## 2019-10-13 ASSESSMENT — PAIN DESCRIPTION - PAIN TYPE
TYPE: ACUTE PAIN
TYPE: ACUTE PAIN

## 2019-10-13 ASSESSMENT — PAIN SCALES - GENERAL
PAINLEVEL_OUTOF10: 8
PAINLEVEL_OUTOF10: 4
PAINLEVEL_OUTOF10: 8
PAINLEVEL_OUTOF10: 7
PAINLEVEL_OUTOF10: 6
PAINLEVEL_OUTOF10: 5
PAINLEVEL_OUTOF10: 2
PAINLEVEL_OUTOF10: 7
PAINLEVEL_OUTOF10: 8
PAINLEVEL_OUTOF10: 7

## 2019-10-13 ASSESSMENT — PAIN DESCRIPTION - LOCATION
LOCATION: BACK
LOCATION: BACK

## 2019-10-13 ASSESSMENT — PAIN DESCRIPTION - DESCRIPTORS
DESCRIPTORS: ACHING
DESCRIPTORS: ACHING;DISCOMFORT;THROBBING

## 2019-10-13 ASSESSMENT — PAIN DESCRIPTION - ORIENTATION: ORIENTATION: MID

## 2019-10-14 ENCOUNTER — APPOINTMENT (OUTPATIENT)
Dept: ULTRASOUND IMAGING | Age: 29
DRG: 420 | End: 2019-10-14
Payer: COMMERCIAL

## 2019-10-14 LAB
ALBUMIN SERPL-MCNC: 3.2 G/DL (ref 3.5–5.2)
ALP BLD-CCNC: 278 U/L (ref 35–104)
ALT SERPL-CCNC: 71 U/L (ref 0–32)
ANION GAP SERPL CALCULATED.3IONS-SCNC: 6 MMOL/L (ref 7–16)
AST SERPL-CCNC: 14 U/L (ref 0–31)
BACTERIA: ABNORMAL /HPF
BASOPHILS ABSOLUTE: 0.01 E9/L (ref 0–0.2)
BASOPHILS RELATIVE PERCENT: 0.1 % (ref 0–2)
BILIRUB SERPL-MCNC: <0.2 MG/DL (ref 0–1.2)
BILIRUBIN DIRECT: <0.2 MG/DL (ref 0–0.3)
BILIRUBIN URINE: NEGATIVE
BILIRUBIN, INDIRECT: ABNORMAL MG/DL (ref 0–1)
BLOOD, URINE: ABNORMAL
BUN BLDV-MCNC: 54 MG/DL (ref 6–20)
CALCIUM SERPL-MCNC: 8.5 MG/DL (ref 8.6–10.2)
CHLORIDE BLD-SCNC: 111 MMOL/L (ref 98–107)
CHLORIDE URINE RANDOM: 37 MMOL/L
CLARITY: CLEAR
CO2: 23 MMOL/L (ref 22–29)
COLOR: YELLOW
CREAT SERPL-MCNC: 2.9 MG/DL (ref 0.5–1)
CREATININE URINE: 92 MG/DL (ref 29–226)
EOSINOPHIL, URINE: 0 % (ref 0–1)
EOSINOPHILS ABSOLUTE: 0 E9/L (ref 0.05–0.5)
EOSINOPHILS RELATIVE PERCENT: 0 % (ref 0–6)
EPITHELIAL CELLS, UA: ABNORMAL /HPF
GFR AFRICAN AMERICAN: 23
GFR NON-AFRICAN AMERICAN: 23 ML/MIN/1.73
GLUCOSE BLD-MCNC: 167 MG/DL (ref 74–99)
GLUCOSE URINE: 250 MG/DL
HCT VFR BLD CALC: 29.5 % (ref 34–48)
HEMOGLOBIN: 9.4 G/DL (ref 11.5–15.5)
IMMATURE GRANULOCYTES #: 0.27 E9/L
IMMATURE GRANULOCYTES %: 2.1 % (ref 0–5)
KETONES, URINE: NEGATIVE MG/DL
LEUKOCYTE ESTERASE, URINE: NEGATIVE
LV EF: 60 %
LVEF MODALITY: NORMAL
LYMPHOCYTES ABSOLUTE: 0.94 E9/L (ref 1.5–4)
LYMPHOCYTES RELATIVE PERCENT: 7.3 % (ref 20–42)
MCH RBC QN AUTO: 30.2 PG (ref 26–35)
MCHC RBC AUTO-ENTMCNC: 31.9 % (ref 32–34.5)
MCV RBC AUTO: 94.9 FL (ref 80–99.9)
METER GLUCOSE: 144 MG/DL (ref 74–99)
METER GLUCOSE: 245 MG/DL (ref 74–99)
METER GLUCOSE: 253 MG/DL (ref 74–99)
METER GLUCOSE: 306 MG/DL (ref 74–99)
MONOCYTES ABSOLUTE: 0.47 E9/L (ref 0.1–0.95)
MONOCYTES RELATIVE PERCENT: 3.6 % (ref 2–12)
NEUTROPHILS ABSOLUTE: 11.27 E9/L (ref 1.8–7.3)
NEUTROPHILS RELATIVE PERCENT: 86.9 % (ref 43–80)
NITRITE, URINE: NEGATIVE
PDW BLD-RTO: 16.2 FL (ref 11.5–15)
PH UA: 8 (ref 5–9)
PLATELET # BLD: 360 E9/L (ref 130–450)
PMV BLD AUTO: 9.8 FL (ref 7–12)
POTASSIUM SERPL-SCNC: 4.4 MMOL/L (ref 3.5–5)
POTASSIUM, UR: 66.6 MMOL/L
PROTEIN UA: >=300 MG/DL
RBC # BLD: 3.11 E12/L (ref 3.5–5.5)
RBC UA: ABNORMAL /HPF (ref 0–2)
SODIUM BLD-SCNC: 140 MMOL/L (ref 132–146)
SODIUM URINE: 43 MMOL/L
SPECIFIC GRAVITY UA: 1.02 (ref 1–1.03)
TOTAL PROTEIN: 5.6 G/DL (ref 6.4–8.3)
UROBILINOGEN, URINE: 0.2 E.U./DL
WBC # BLD: 13 E9/L (ref 4.5–11.5)
WBC UA: ABNORMAL /HPF (ref 0–5)

## 2019-10-14 PROCEDURE — 93306 TTE W/DOPPLER COMPLETE: CPT

## 2019-10-14 PROCEDURE — 82962 GLUCOSE BLOOD TEST: CPT

## 2019-10-14 PROCEDURE — 6370000000 HC RX 637 (ALT 250 FOR IP): Performed by: INTERNAL MEDICINE

## 2019-10-14 PROCEDURE — 81001 URINALYSIS AUTO W/SCOPE: CPT

## 2019-10-14 PROCEDURE — 80048 BASIC METABOLIC PNL TOTAL CA: CPT

## 2019-10-14 PROCEDURE — 93970 EXTREMITY STUDY: CPT

## 2019-10-14 PROCEDURE — 1200000000 HC SEMI PRIVATE

## 2019-10-14 PROCEDURE — 82570 ASSAY OF URINE CREATININE: CPT

## 2019-10-14 PROCEDURE — 84300 ASSAY OF URINE SODIUM: CPT

## 2019-10-14 PROCEDURE — 6370000000 HC RX 637 (ALT 250 FOR IP): Performed by: NURSE PRACTITIONER

## 2019-10-14 PROCEDURE — 2580000003 HC RX 258: Performed by: INTERNAL MEDICINE

## 2019-10-14 PROCEDURE — 6360000002 HC RX W HCPCS: Performed by: INTERNAL MEDICINE

## 2019-10-14 PROCEDURE — 6370000000 HC RX 637 (ALT 250 FOR IP): Performed by: STUDENT IN AN ORGANIZED HEALTH CARE EDUCATION/TRAINING PROGRAM

## 2019-10-14 PROCEDURE — 80076 HEPATIC FUNCTION PANEL: CPT

## 2019-10-14 PROCEDURE — 82436 ASSAY OF URINE CHLORIDE: CPT

## 2019-10-14 PROCEDURE — 84133 ASSAY OF URINE POTASSIUM: CPT

## 2019-10-14 PROCEDURE — 87205 SMEAR GRAM STAIN: CPT

## 2019-10-14 PROCEDURE — 99233 SBSQ HOSP IP/OBS HIGH 50: CPT | Performed by: INTERNAL MEDICINE

## 2019-10-14 PROCEDURE — 85025 COMPLETE CBC W/AUTO DIFF WBC: CPT

## 2019-10-14 PROCEDURE — 36415 COLL VENOUS BLD VENIPUNCTURE: CPT

## 2019-10-14 RX ORDER — METOPROLOL TARTRATE 50 MG/1
100 TABLET, FILM COATED ORAL 2 TIMES DAILY
Status: DISCONTINUED | OUTPATIENT
Start: 2019-10-14 | End: 2019-10-17 | Stop reason: HOSPADM

## 2019-10-14 RX ORDER — INSULIN GLARGINE 100 [IU]/ML
30 INJECTION, SOLUTION SUBCUTANEOUS NIGHTLY
Status: DISCONTINUED | OUTPATIENT
Start: 2019-10-14 | End: 2019-10-15

## 2019-10-14 RX ORDER — METOPROLOL TARTRATE 50 MG/1
50 TABLET, FILM COATED ORAL 2 TIMES DAILY
Status: DISCONTINUED | OUTPATIENT
Start: 2019-10-14 | End: 2019-10-14

## 2019-10-14 RX ORDER — FAMOTIDINE 20 MG/1
20 TABLET, FILM COATED ORAL 2 TIMES DAILY
Status: DISCONTINUED | OUTPATIENT
Start: 2019-10-14 | End: 2019-10-17 | Stop reason: HOSPADM

## 2019-10-14 RX ADMIN — GUANFACINE 1 MG: 1 TABLET ORAL at 16:29

## 2019-10-14 RX ADMIN — FAMOTIDINE 20 MG: 20 TABLET ORAL at 09:20

## 2019-10-14 RX ADMIN — OXYCODONE HYDROCHLORIDE AND ACETAMINOPHEN 2 TABLET: 5; 325 TABLET ORAL at 06:19

## 2019-10-14 RX ADMIN — OXYCODONE HYDROCHLORIDE AND ACETAMINOPHEN 2 TABLET: 5; 325 TABLET ORAL at 10:51

## 2019-10-14 RX ADMIN — CYCLOBENZAPRINE HYDROCHLORIDE 10 MG: 10 TABLET, FILM COATED ORAL at 22:50

## 2019-10-14 RX ADMIN — Medication 10 ML: at 20:15

## 2019-10-14 RX ADMIN — METOPROLOL TARTRATE 50 MG: 50 TABLET ORAL at 09:20

## 2019-10-14 RX ADMIN — GUANFACINE 1 MG: 1 TABLET ORAL at 09:21

## 2019-10-14 RX ADMIN — HYDROXYZINE PAMOATE 25 MG: 25 CAPSULE ORAL at 17:19

## 2019-10-14 RX ADMIN — OXYCODONE HYDROCHLORIDE AND ACETAMINOPHEN 2 TABLET: 5; 325 TABLET ORAL at 15:27

## 2019-10-14 RX ADMIN — INSULIN LISPRO 8 UNITS: 100 INJECTION, SOLUTION INTRAVENOUS; SUBCUTANEOUS at 12:23

## 2019-10-14 RX ADMIN — METHYLPREDNISOLONE SODIUM SUCCINATE 40 MG: 40 INJECTION, POWDER, FOR SOLUTION INTRAMUSCULAR; INTRAVENOUS at 13:09

## 2019-10-14 RX ADMIN — OXYCODONE HYDROCHLORIDE AND ACETAMINOPHEN 2 TABLET: 5; 325 TABLET ORAL at 19:40

## 2019-10-14 RX ADMIN — INSULIN GLARGINE 30 UNITS: 100 INJECTION, SOLUTION SUBCUTANEOUS at 20:35

## 2019-10-14 RX ADMIN — AMITRIPTYLINE HYDROCHLORIDE 10 MG: 10 TABLET, FILM COATED ORAL at 20:15

## 2019-10-14 RX ADMIN — FAMOTIDINE 20 MG: 20 TABLET ORAL at 20:14

## 2019-10-14 RX ADMIN — METHYLPREDNISOLONE SODIUM SUCCINATE 40 MG: 40 INJECTION, POWDER, FOR SOLUTION INTRAMUSCULAR; INTRAVENOUS at 03:25

## 2019-10-14 RX ADMIN — INSULIN LISPRO 4 UNITS: 100 INJECTION, SOLUTION INTRAVENOUS; SUBCUTANEOUS at 16:30

## 2019-10-14 RX ADMIN — INSULIN LISPRO 2 UNITS: 100 INJECTION, SOLUTION INTRAVENOUS; SUBCUTANEOUS at 07:47

## 2019-10-14 RX ADMIN — INSULIN LISPRO 3 UNITS: 100 INJECTION, SOLUTION INTRAVENOUS; SUBCUTANEOUS at 20:35

## 2019-10-14 RX ADMIN — METHYLPREDNISOLONE SODIUM SUCCINATE 40 MG: 40 INJECTION, POWDER, FOR SOLUTION INTRAMUSCULAR; INTRAVENOUS at 20:14

## 2019-10-14 RX ADMIN — DILTIAZEM HYDROCHLORIDE 240 MG: 240 CAPSULE, COATED, EXTENDED RELEASE ORAL at 09:20

## 2019-10-14 RX ADMIN — METOPROLOL TARTRATE 100 MG: 50 TABLET ORAL at 20:14

## 2019-10-14 ASSESSMENT — PAIN SCALES - GENERAL
PAINLEVEL_OUTOF10: 6
PAINLEVEL_OUTOF10: 8
PAINLEVEL_OUTOF10: 4
PAINLEVEL_OUTOF10: 7
PAINLEVEL_OUTOF10: 5
PAINLEVEL_OUTOF10: 8
PAINLEVEL_OUTOF10: 3
PAINLEVEL_OUTOF10: 8

## 2019-10-14 ASSESSMENT — PAIN DESCRIPTION - ONSET: ONSET: ON-GOING

## 2019-10-14 ASSESSMENT — PAIN DESCRIPTION - LOCATION
LOCATION: BACK
LOCATION: BUTTOCKS;BACK

## 2019-10-14 ASSESSMENT — PAIN DESCRIPTION - DESCRIPTORS
DESCRIPTORS: ACHING;SHOOTING
DESCRIPTORS: ACHING;DISCOMFORT;TENDER

## 2019-10-14 ASSESSMENT — PAIN DESCRIPTION - PROGRESSION: CLINICAL_PROGRESSION: GRADUALLY IMPROVING

## 2019-10-14 ASSESSMENT — PAIN DESCRIPTION - FREQUENCY: FREQUENCY: INTERMITTENT

## 2019-10-14 ASSESSMENT — PAIN DESCRIPTION - ORIENTATION: ORIENTATION: LEFT;RIGHT;LOWER

## 2019-10-14 ASSESSMENT — PAIN DESCRIPTION - PAIN TYPE
TYPE: ACUTE PAIN
TYPE: ACUTE PAIN

## 2019-10-15 LAB
ANION GAP SERPL CALCULATED.3IONS-SCNC: 9 MMOL/L (ref 7–16)
BUN BLDV-MCNC: 57 MG/DL (ref 6–20)
CALCIUM SERPL-MCNC: 8.4 MG/DL (ref 8.6–10.2)
CHLORIDE BLD-SCNC: 109 MMOL/L (ref 98–107)
CO2: 22 MMOL/L (ref 22–29)
CREAT SERPL-MCNC: 2.9 MG/DL (ref 0.5–1)
GFR AFRICAN AMERICAN: 23
GFR NON-AFRICAN AMERICAN: 23 ML/MIN/1.73
GLUCOSE BLD-MCNC: 267 MG/DL (ref 74–99)
METER GLUCOSE: 198 MG/DL (ref 74–99)
METER GLUCOSE: 249 MG/DL (ref 74–99)
METER GLUCOSE: 263 MG/DL (ref 74–99)
METER GLUCOSE: 338 MG/DL (ref 74–99)
PHOSPHORUS: 4.3 MG/DL (ref 2.5–4.5)
POTASSIUM SERPL-SCNC: 5 MMOL/L (ref 3.5–5)
SODIUM BLD-SCNC: 140 MMOL/L (ref 132–146)

## 2019-10-15 PROCEDURE — 1200000000 HC SEMI PRIVATE

## 2019-10-15 PROCEDURE — 99233 SBSQ HOSP IP/OBS HIGH 50: CPT | Performed by: INTERNAL MEDICINE

## 2019-10-15 PROCEDURE — 6370000000 HC RX 637 (ALT 250 FOR IP): Performed by: STUDENT IN AN ORGANIZED HEALTH CARE EDUCATION/TRAINING PROGRAM

## 2019-10-15 PROCEDURE — 6360000002 HC RX W HCPCS: Performed by: INTERNAL MEDICINE

## 2019-10-15 PROCEDURE — 82962 GLUCOSE BLOOD TEST: CPT

## 2019-10-15 PROCEDURE — 86038 ANTINUCLEAR ANTIBODIES: CPT

## 2019-10-15 PROCEDURE — 84100 ASSAY OF PHOSPHORUS: CPT

## 2019-10-15 PROCEDURE — 6370000000 HC RX 637 (ALT 250 FOR IP): Performed by: NURSE PRACTITIONER

## 2019-10-15 PROCEDURE — 6370000000 HC RX 637 (ALT 250 FOR IP): Performed by: INTERNAL MEDICINE

## 2019-10-15 PROCEDURE — 80048 BASIC METABOLIC PNL TOTAL CA: CPT

## 2019-10-15 PROCEDURE — 2580000003 HC RX 258: Performed by: INTERNAL MEDICINE

## 2019-10-15 PROCEDURE — 36415 COLL VENOUS BLD VENIPUNCTURE: CPT

## 2019-10-15 PROCEDURE — 86255 FLUORESCENT ANTIBODY SCREEN: CPT

## 2019-10-15 RX ORDER — FUROSEMIDE 20 MG/1
20 TABLET ORAL ONCE
Status: COMPLETED | OUTPATIENT
Start: 2019-10-15 | End: 2019-10-15

## 2019-10-15 RX ORDER — INSULIN GLARGINE 100 [IU]/ML
35 INJECTION, SOLUTION SUBCUTANEOUS NIGHTLY
Status: DISCONTINUED | OUTPATIENT
Start: 2019-10-15 | End: 2019-10-17 | Stop reason: HOSPADM

## 2019-10-15 RX ADMIN — AMITRIPTYLINE HYDROCHLORIDE 10 MG: 10 TABLET, FILM COATED ORAL at 19:58

## 2019-10-15 RX ADMIN — OXYCODONE HYDROCHLORIDE AND ACETAMINOPHEN 2 TABLET: 5; 325 TABLET ORAL at 14:04

## 2019-10-15 RX ADMIN — OXYCODONE HYDROCHLORIDE AND ACETAMINOPHEN 2 TABLET: 5; 325 TABLET ORAL at 19:57

## 2019-10-15 RX ADMIN — METHYLPREDNISOLONE SODIUM SUCCINATE 40 MG: 40 INJECTION, POWDER, FOR SOLUTION INTRAMUSCULAR; INTRAVENOUS at 03:55

## 2019-10-15 RX ADMIN — FAMOTIDINE 20 MG: 20 TABLET ORAL at 09:30

## 2019-10-15 RX ADMIN — Medication 10 ML: at 11:42

## 2019-10-15 RX ADMIN — DILTIAZEM HYDROCHLORIDE 240 MG: 240 CAPSULE, COATED, EXTENDED RELEASE ORAL at 10:27

## 2019-10-15 RX ADMIN — GUANFACINE 1 MG: 1 TABLET ORAL at 09:30

## 2019-10-15 RX ADMIN — Medication 10 ML: at 11:41

## 2019-10-15 RX ADMIN — HYDROXYZINE PAMOATE 25 MG: 25 CAPSULE ORAL at 11:40

## 2019-10-15 RX ADMIN — GUANFACINE 1 MG: 1 TABLET ORAL at 16:25

## 2019-10-15 RX ADMIN — FUROSEMIDE 20 MG: 20 TABLET ORAL at 20:51

## 2019-10-15 RX ADMIN — INSULIN LISPRO 4 UNITS: 100 INJECTION, SOLUTION INTRAVENOUS; SUBCUTANEOUS at 08:46

## 2019-10-15 RX ADMIN — Medication 10 ML: at 20:00

## 2019-10-15 RX ADMIN — INSULIN LISPRO 1 UNITS: 100 INJECTION, SOLUTION INTRAVENOUS; SUBCUTANEOUS at 20:21

## 2019-10-15 RX ADMIN — INSULIN LISPRO 8 UNITS: 100 INJECTION, SOLUTION INTRAVENOUS; SUBCUTANEOUS at 11:33

## 2019-10-15 RX ADMIN — OXYCODONE HYDROCHLORIDE AND ACETAMINOPHEN 2 TABLET: 5; 325 TABLET ORAL at 09:05

## 2019-10-15 RX ADMIN — METOPROLOL TARTRATE 100 MG: 50 TABLET ORAL at 19:57

## 2019-10-15 RX ADMIN — METOPROLOL TARTRATE 100 MG: 50 TABLET ORAL at 09:30

## 2019-10-15 RX ADMIN — FAMOTIDINE 20 MG: 20 TABLET ORAL at 19:58

## 2019-10-15 RX ADMIN — CYCLOBENZAPRINE HYDROCHLORIDE 10 MG: 10 TABLET, FILM COATED ORAL at 09:05

## 2019-10-15 RX ADMIN — METHYLPREDNISOLONE SODIUM SUCCINATE 40 MG: 40 INJECTION, POWDER, FOR SOLUTION INTRAMUSCULAR; INTRAVENOUS at 11:40

## 2019-10-15 RX ADMIN — METHYLPREDNISOLONE SODIUM SUCCINATE 40 MG: 40 INJECTION, POWDER, FOR SOLUTION INTRAMUSCULAR; INTRAVENOUS at 20:21

## 2019-10-15 RX ADMIN — INSULIN GLARGINE 35 UNITS: 100 INJECTION, SOLUTION SUBCUTANEOUS at 20:21

## 2019-10-15 RX ADMIN — CYCLOBENZAPRINE HYDROCHLORIDE 10 MG: 10 TABLET, FILM COATED ORAL at 17:16

## 2019-10-15 RX ADMIN — INSULIN LISPRO 5 UNITS: 100 INJECTION, SOLUTION INTRAVENOUS; SUBCUTANEOUS at 16:25

## 2019-10-15 RX ADMIN — INSULIN LISPRO 6 UNITS: 100 INJECTION, SOLUTION INTRAVENOUS; SUBCUTANEOUS at 16:24

## 2019-10-15 ASSESSMENT — PAIN SCALES - GENERAL
PAINLEVEL_OUTOF10: 1
PAINLEVEL_OUTOF10: 2
PAINLEVEL_OUTOF10: 1
PAINLEVEL_OUTOF10: 0
PAINLEVEL_OUTOF10: 9
PAINLEVEL_OUTOF10: 8
PAINLEVEL_OUTOF10: 8
PAINLEVEL_OUTOF10: 3

## 2019-10-15 ASSESSMENT — PAIN DESCRIPTION - PAIN TYPE: TYPE: ACUTE PAIN

## 2019-10-15 ASSESSMENT — PAIN - FUNCTIONAL ASSESSMENT: PAIN_FUNCTIONAL_ASSESSMENT: PREVENTS OR INTERFERES SOME ACTIVE ACTIVITIES AND ADLS

## 2019-10-15 ASSESSMENT — PAIN DESCRIPTION - ORIENTATION: ORIENTATION: RIGHT;LEFT

## 2019-10-15 ASSESSMENT — PAIN DESCRIPTION - FREQUENCY: FREQUENCY: CONTINUOUS

## 2019-10-15 ASSESSMENT — PAIN DESCRIPTION - LOCATION: LOCATION: BACK

## 2019-10-15 ASSESSMENT — PAIN DESCRIPTION - ONSET: ONSET: ON-GOING

## 2019-10-15 ASSESSMENT — PAIN DESCRIPTION - PROGRESSION
CLINICAL_PROGRESSION: GRADUALLY IMPROVING
CLINICAL_PROGRESSION: GRADUALLY WORSENING
CLINICAL_PROGRESSION: GRADUALLY IMPROVING

## 2019-10-15 ASSESSMENT — PAIN DESCRIPTION - DESCRIPTORS: DESCRIPTORS: ACHING;DISCOMFORT;DULL

## 2019-10-16 LAB
ANION GAP SERPL CALCULATED.3IONS-SCNC: 9 MMOL/L (ref 7–16)
ANTI-NUCLEAR ANTIBODY (ANA): NEGATIVE
BUN BLDV-MCNC: 65 MG/DL (ref 6–20)
CALCIUM SERPL-MCNC: 8.1 MG/DL (ref 8.6–10.2)
CHLORIDE BLD-SCNC: 109 MMOL/L (ref 98–107)
CO2: 21 MMOL/L (ref 22–29)
CREAT SERPL-MCNC: 3.1 MG/DL (ref 0.5–1)
GFR AFRICAN AMERICAN: 22
GFR NON-AFRICAN AMERICAN: 22 ML/MIN/1.73
GLUCOSE BLD-MCNC: 285 MG/DL (ref 74–99)
METER GLUCOSE: 147 MG/DL (ref 74–99)
METER GLUCOSE: 173 MG/DL (ref 74–99)
METER GLUCOSE: 253 MG/DL (ref 74–99)
METER GLUCOSE: 265 MG/DL (ref 74–99)
POTASSIUM SERPL-SCNC: 5 MMOL/L (ref 3.5–5)
SODIUM BLD-SCNC: 139 MMOL/L (ref 132–146)

## 2019-10-16 PROCEDURE — 6370000000 HC RX 637 (ALT 250 FOR IP): Performed by: STUDENT IN AN ORGANIZED HEALTH CARE EDUCATION/TRAINING PROGRAM

## 2019-10-16 PROCEDURE — 80048 BASIC METABOLIC PNL TOTAL CA: CPT

## 2019-10-16 PROCEDURE — 6370000000 HC RX 637 (ALT 250 FOR IP): Performed by: INTERNAL MEDICINE

## 2019-10-16 PROCEDURE — 36415 COLL VENOUS BLD VENIPUNCTURE: CPT

## 2019-10-16 PROCEDURE — 6370000000 HC RX 637 (ALT 250 FOR IP): Performed by: NURSE PRACTITIONER

## 2019-10-16 PROCEDURE — 6360000002 HC RX W HCPCS: Performed by: INTERNAL MEDICINE

## 2019-10-16 PROCEDURE — 99233 SBSQ HOSP IP/OBS HIGH 50: CPT | Performed by: INTERNAL MEDICINE

## 2019-10-16 PROCEDURE — 1200000000 HC SEMI PRIVATE

## 2019-10-16 PROCEDURE — 2580000003 HC RX 258: Performed by: INTERNAL MEDICINE

## 2019-10-16 PROCEDURE — 82962 GLUCOSE BLOOD TEST: CPT

## 2019-10-16 RX ORDER — PREDNISONE 20 MG/1
20 TABLET ORAL DAILY
Status: DISCONTINUED | OUTPATIENT
Start: 2019-10-17 | End: 2019-10-17 | Stop reason: HOSPADM

## 2019-10-16 RX ORDER — BUMETANIDE 1 MG/1
1 TABLET ORAL 2 TIMES DAILY
Status: DISCONTINUED | OUTPATIENT
Start: 2019-10-16 | End: 2019-10-17 | Stop reason: HOSPADM

## 2019-10-16 RX ORDER — METHYLPREDNISOLONE SODIUM SUCCINATE 40 MG/ML
40 INJECTION, POWDER, LYOPHILIZED, FOR SOLUTION INTRAMUSCULAR; INTRAVENOUS EVERY 12 HOURS
Status: DISCONTINUED | OUTPATIENT
Start: 2019-10-16 | End: 2019-10-16

## 2019-10-16 RX ADMIN — OXYCODONE HYDROCHLORIDE AND ACETAMINOPHEN 2 TABLET: 5; 325 TABLET ORAL at 03:25

## 2019-10-16 RX ADMIN — Medication 10 ML: at 21:17

## 2019-10-16 RX ADMIN — OXYCODONE HYDROCHLORIDE AND ACETAMINOPHEN 2 TABLET: 5; 325 TABLET ORAL at 19:09

## 2019-10-16 RX ADMIN — METHYLPREDNISOLONE SODIUM SUCCINATE 40 MG: 40 INJECTION, POWDER, FOR SOLUTION INTRAMUSCULAR; INTRAVENOUS at 04:06

## 2019-10-16 RX ADMIN — FAMOTIDINE 20 MG: 20 TABLET ORAL at 21:22

## 2019-10-16 RX ADMIN — BUMETANIDE 1 MG: 1 TABLET ORAL at 17:31

## 2019-10-16 RX ADMIN — INSULIN GLARGINE 35 UNITS: 100 INJECTION, SOLUTION SUBCUTANEOUS at 21:24

## 2019-10-16 RX ADMIN — INSULIN LISPRO 5 UNITS: 100 INJECTION, SOLUTION INTRAVENOUS; SUBCUTANEOUS at 11:47

## 2019-10-16 RX ADMIN — DILTIAZEM HYDROCHLORIDE 240 MG: 240 CAPSULE, COATED, EXTENDED RELEASE ORAL at 08:37

## 2019-10-16 RX ADMIN — METOPROLOL TARTRATE 100 MG: 50 TABLET ORAL at 21:22

## 2019-10-16 RX ADMIN — CYCLOBENZAPRINE HYDROCHLORIDE 10 MG: 10 TABLET, FILM COATED ORAL at 19:09

## 2019-10-16 RX ADMIN — INSULIN LISPRO 2 UNITS: 100 INJECTION, SOLUTION INTRAVENOUS; SUBCUTANEOUS at 17:32

## 2019-10-16 RX ADMIN — FAMOTIDINE 20 MG: 20 TABLET ORAL at 08:38

## 2019-10-16 RX ADMIN — AMITRIPTYLINE HYDROCHLORIDE 10 MG: 10 TABLET, FILM COATED ORAL at 21:22

## 2019-10-16 RX ADMIN — OXYCODONE HYDROCHLORIDE AND ACETAMINOPHEN 2 TABLET: 5; 325 TABLET ORAL at 10:54

## 2019-10-16 RX ADMIN — INSULIN LISPRO 1 UNITS: 100 INJECTION, SOLUTION INTRAVENOUS; SUBCUTANEOUS at 21:24

## 2019-10-16 RX ADMIN — INSULIN LISPRO 5 UNITS: 100 INJECTION, SOLUTION INTRAVENOUS; SUBCUTANEOUS at 08:35

## 2019-10-16 RX ADMIN — OXYCODONE HYDROCHLORIDE AND ACETAMINOPHEN 2 TABLET: 5; 325 TABLET ORAL at 14:54

## 2019-10-16 RX ADMIN — CYCLOBENZAPRINE HYDROCHLORIDE 10 MG: 10 TABLET, FILM COATED ORAL at 10:53

## 2019-10-16 RX ADMIN — INSULIN LISPRO 5 UNITS: 100 INJECTION, SOLUTION INTRAVENOUS; SUBCUTANEOUS at 17:32

## 2019-10-16 RX ADMIN — METOPROLOL TARTRATE 100 MG: 50 TABLET ORAL at 08:38

## 2019-10-16 RX ADMIN — GUANFACINE 1 MG: 1 TABLET ORAL at 17:31

## 2019-10-16 RX ADMIN — INSULIN LISPRO 6 UNITS: 100 INJECTION, SOLUTION INTRAVENOUS; SUBCUTANEOUS at 11:47

## 2019-10-16 RX ADMIN — METHYLPREDNISOLONE SODIUM SUCCINATE 40 MG: 40 INJECTION, POWDER, FOR SOLUTION INTRAMUSCULAR; INTRAVENOUS at 10:46

## 2019-10-16 RX ADMIN — GUANFACINE 1 MG: 1 TABLET ORAL at 10:46

## 2019-10-16 RX ADMIN — INSULIN LISPRO 6 UNITS: 100 INJECTION, SOLUTION INTRAVENOUS; SUBCUTANEOUS at 08:36

## 2019-10-16 ASSESSMENT — PAIN DESCRIPTION - LOCATION
LOCATION: BACK
LOCATION: BACK;ABDOMEN;LEG

## 2019-10-16 ASSESSMENT — PAIN DESCRIPTION - PAIN TYPE
TYPE: ACUTE PAIN
TYPE: ACUTE PAIN

## 2019-10-16 ASSESSMENT — PAIN SCALES - GENERAL
PAINLEVEL_OUTOF10: 8
PAINLEVEL_OUTOF10: 9
PAINLEVEL_OUTOF10: 0
PAINLEVEL_OUTOF10: 8
PAINLEVEL_OUTOF10: 3
PAINLEVEL_OUTOF10: 9
PAINLEVEL_OUTOF10: 0
PAINLEVEL_OUTOF10: 7
PAINLEVEL_OUTOF10: 6
PAINLEVEL_OUTOF10: 8

## 2019-10-16 ASSESSMENT — PAIN DESCRIPTION - ORIENTATION: ORIENTATION: LOWER

## 2019-10-16 ASSESSMENT — PAIN DESCRIPTION - DESCRIPTORS
DESCRIPTORS: ACHING;DISCOMFORT;PRESSURE
DESCRIPTORS: ACHING;DISCOMFORT;SORE

## 2019-10-16 ASSESSMENT — PAIN - FUNCTIONAL ASSESSMENT: PAIN_FUNCTIONAL_ASSESSMENT: PREVENTS OR INTERFERES SOME ACTIVE ACTIVITIES AND ADLS

## 2019-10-17 VITALS
HEIGHT: 64 IN | HEART RATE: 89 BPM | OXYGEN SATURATION: 98 % | RESPIRATION RATE: 16 BRPM | SYSTOLIC BLOOD PRESSURE: 167 MMHG | WEIGHT: 187 LBS | BODY MASS INDEX: 31.92 KG/M2 | DIASTOLIC BLOOD PRESSURE: 103 MMHG | TEMPERATURE: 98.9 F

## 2019-10-17 LAB
ANCA IFA: NORMAL
ANION GAP SERPL CALCULATED.3IONS-SCNC: 11 MMOL/L (ref 7–16)
BUN BLDV-MCNC: 72 MG/DL (ref 6–20)
CALCIUM SERPL-MCNC: 7.8 MG/DL (ref 8.6–10.2)
CHLORIDE BLD-SCNC: 108 MMOL/L (ref 98–107)
CO2: 22 MMOL/L (ref 22–29)
CREAT SERPL-MCNC: 3.2 MG/DL (ref 0.5–1)
GFR AFRICAN AMERICAN: 21
GFR NON-AFRICAN AMERICAN: 21 ML/MIN/1.73
GLUCOSE BLD-MCNC: 52 MG/DL (ref 74–99)
METER GLUCOSE: 132 MG/DL (ref 74–99)
METER GLUCOSE: 43 MG/DL (ref 74–99)
METER GLUCOSE: 93 MG/DL (ref 74–99)
POTASSIUM SERPL-SCNC: 4.8 MMOL/L (ref 3.5–5)
SODIUM BLD-SCNC: 141 MMOL/L (ref 132–146)

## 2019-10-17 PROCEDURE — 2580000003 HC RX 258: Performed by: INTERNAL MEDICINE

## 2019-10-17 PROCEDURE — 6370000000 HC RX 637 (ALT 250 FOR IP): Performed by: INTERNAL MEDICINE

## 2019-10-17 PROCEDURE — 82962 GLUCOSE BLOOD TEST: CPT

## 2019-10-17 PROCEDURE — 6370000000 HC RX 637 (ALT 250 FOR IP): Performed by: NURSE PRACTITIONER

## 2019-10-17 PROCEDURE — 36415 COLL VENOUS BLD VENIPUNCTURE: CPT

## 2019-10-17 PROCEDURE — 99239 HOSP IP/OBS DSCHRG MGMT >30: CPT | Performed by: INTERNAL MEDICINE

## 2019-10-17 PROCEDURE — 80048 BASIC METABOLIC PNL TOTAL CA: CPT

## 2019-10-17 PROCEDURE — 6370000000 HC RX 637 (ALT 250 FOR IP): Performed by: STUDENT IN AN ORGANIZED HEALTH CARE EDUCATION/TRAINING PROGRAM

## 2019-10-17 PROCEDURE — 6360000002 HC RX W HCPCS: Performed by: INTERNAL MEDICINE

## 2019-10-17 RX ORDER — BUMETANIDE 1 MG/1
1 TABLET ORAL 2 TIMES DAILY
Qty: 30 TABLET | Refills: 0 | Status: ON HOLD | OUTPATIENT
Start: 2019-10-17 | End: 2019-11-09 | Stop reason: HOSPADM

## 2019-10-17 RX ORDER — GUANFACINE 1 MG/1
1 TABLET ORAL 2 TIMES DAILY
Qty: 30 TABLET | Refills: 0 | Status: ON HOLD | OUTPATIENT
Start: 2019-10-17 | End: 2019-11-09 | Stop reason: HOSPADM

## 2019-10-17 RX ORDER — DILTIAZEM HYDROCHLORIDE 240 MG/1
240 CAPSULE, COATED, EXTENDED RELEASE ORAL DAILY
Qty: 30 CAPSULE | Refills: 0 | Status: ON HOLD | OUTPATIENT
Start: 2019-10-18 | End: 2019-10-30 | Stop reason: ALTCHOICE

## 2019-10-17 RX ORDER — FAMOTIDINE 20 MG/1
20 TABLET, FILM COATED ORAL 2 TIMES DAILY
Qty: 60 TABLET | Refills: 0 | Status: ON HOLD | OUTPATIENT
Start: 2019-10-17 | End: 2020-07-21 | Stop reason: HOSPADM

## 2019-10-17 RX ORDER — AMITRIPTYLINE HYDROCHLORIDE 10 MG/1
10 TABLET, FILM COATED ORAL NIGHTLY
Qty: 30 TABLET | Refills: 0 | Status: ON HOLD | OUTPATIENT
Start: 2019-10-17 | End: 2020-01-25 | Stop reason: SDUPTHER

## 2019-10-17 RX ORDER — METOPROLOL TARTRATE 100 MG/1
100 TABLET ORAL 2 TIMES DAILY
Qty: 60 TABLET | Refills: 0 | Status: ON HOLD | OUTPATIENT
Start: 2019-10-17 | End: 2020-01-25 | Stop reason: SDUPTHER

## 2019-10-17 RX ORDER — CYCLOBENZAPRINE HCL 10 MG
10 TABLET ORAL 3 TIMES DAILY PRN
Qty: 30 TABLET | Refills: 0 | Status: SHIPPED | OUTPATIENT
Start: 2019-10-17 | End: 2019-10-27

## 2019-10-17 RX ORDER — PREDNISONE 20 MG/1
20 TABLET ORAL DAILY
Qty: 10 TABLET | Refills: 0 | Status: SHIPPED | OUTPATIENT
Start: 2019-10-18 | End: 2019-10-28

## 2019-10-17 RX ADMIN — PREDNISONE 20 MG: 20 TABLET ORAL at 08:07

## 2019-10-17 RX ADMIN — FAMOTIDINE 20 MG: 20 TABLET ORAL at 08:11

## 2019-10-17 RX ADMIN — METOPROLOL TARTRATE 100 MG: 50 TABLET ORAL at 08:09

## 2019-10-17 RX ADMIN — INSULIN LISPRO 5 UNITS: 100 INJECTION, SOLUTION INTRAVENOUS; SUBCUTANEOUS at 11:08

## 2019-10-17 RX ADMIN — Medication 10 ML: at 08:11

## 2019-10-17 RX ADMIN — GUANFACINE 1 MG: 1 TABLET ORAL at 08:07

## 2019-10-17 RX ADMIN — DILTIAZEM HYDROCHLORIDE 240 MG: 240 CAPSULE, COATED, EXTENDED RELEASE ORAL at 08:07

## 2019-10-17 RX ADMIN — POLYETHYLENE GLYCOL 3350 17 G: 17 POWDER, FOR SOLUTION ORAL at 08:10

## 2019-10-17 RX ADMIN — BUMETANIDE 1 MG: 1 TABLET ORAL at 08:07

## 2019-10-17 RX ADMIN — OXYCODONE HYDROCHLORIDE AND ACETAMINOPHEN 2 TABLET: 5; 325 TABLET ORAL at 12:27

## 2019-10-17 RX ADMIN — CYCLOBENZAPRINE HYDROCHLORIDE 10 MG: 10 TABLET, FILM COATED ORAL at 12:27

## 2019-10-17 RX ADMIN — DARBEPOETIN ALFA 100 MCG: 100 INJECTION, SOLUTION INTRAVENOUS; SUBCUTANEOUS at 08:10

## 2019-10-17 RX ADMIN — OXYCODONE HYDROCHLORIDE AND ACETAMINOPHEN 1 TABLET: 5; 325 TABLET ORAL at 08:08

## 2019-10-17 RX ADMIN — DEXTROSE MONOHYDRATE 12.5 G: 500 INJECTION PARENTERAL at 05:24

## 2019-10-17 ASSESSMENT — PAIN SCALES - GENERAL
PAINLEVEL_OUTOF10: 8
PAINLEVEL_OUTOF10: 0
PAINLEVEL_OUTOF10: 8

## 2019-10-29 ENCOUNTER — APPOINTMENT (OUTPATIENT)
Dept: CT IMAGING | Age: 29
DRG: 710 | End: 2019-10-29
Payer: COMMERCIAL

## 2019-10-29 ENCOUNTER — APPOINTMENT (OUTPATIENT)
Dept: GENERAL RADIOLOGY | Age: 29
DRG: 710 | End: 2019-10-29
Payer: COMMERCIAL

## 2019-10-29 ENCOUNTER — HOSPITAL ENCOUNTER (INPATIENT)
Age: 29
LOS: 11 days | Discharge: HOME | DRG: 710 | End: 2019-11-09
Attending: EMERGENCY MEDICINE | Admitting: INTERNAL MEDICINE
Payer: COMMERCIAL

## 2019-10-29 DIAGNOSIS — R09.02 HYPOXEMIA: ICD-10-CM

## 2019-10-29 DIAGNOSIS — R73.9 HYPERGLYCEMIA: ICD-10-CM

## 2019-10-29 DIAGNOSIS — G89.18 POST-OP PAIN: ICD-10-CM

## 2019-10-29 DIAGNOSIS — R60.1 ANASARCA: ICD-10-CM

## 2019-10-29 DIAGNOSIS — N17.9 ACUTE KIDNEY INJURY SUPERIMPOSED ON CKD (HCC): ICD-10-CM

## 2019-10-29 DIAGNOSIS — N18.9 ACUTE KIDNEY INJURY SUPERIMPOSED ON CKD (HCC): ICD-10-CM

## 2019-10-29 DIAGNOSIS — A41.9 SEPTICEMIA (HCC): Primary | ICD-10-CM

## 2019-10-29 DIAGNOSIS — J96.01 ACUTE RESPIRATORY FAILURE WITH HYPOXIA (HCC): ICD-10-CM

## 2019-10-29 DIAGNOSIS — R41.82 ALTERED MENTAL STATUS, UNSPECIFIED ALTERED MENTAL STATUS TYPE: ICD-10-CM

## 2019-10-29 DIAGNOSIS — K81.9 CHOLECYSTITIS: ICD-10-CM

## 2019-10-29 DIAGNOSIS — J69.0 ASPIRATION PNEUMONIA OF BOTH LUNGS, UNSPECIFIED ASPIRATION PNEUMONIA TYPE, UNSPECIFIED PART OF LUNG (HCC): ICD-10-CM

## 2019-10-29 PROBLEM — J96.00 ACUTE RESPIRATORY FAILURE (HCC): Status: ACTIVE | Noted: 2019-10-29

## 2019-10-29 PROBLEM — E87.5 ACUTE HYPERKALEMIA: Status: ACTIVE | Noted: 2019-10-29

## 2019-10-29 PROBLEM — J18.9 HCAP (HEALTHCARE-ASSOCIATED PNEUMONIA): Status: ACTIVE | Noted: 2019-10-29

## 2019-10-29 LAB
ALBUMIN SERPL-MCNC: 3 G/DL (ref 3.5–5.2)
ALP BLD-CCNC: 236 U/L (ref 35–104)
ALT SERPL-CCNC: 67 U/L (ref 0–32)
AMORPHOUS: NORMAL
AMPHETAMINE SCREEN, URINE: NOT DETECTED
ANION GAP SERPL CALCULATED.3IONS-SCNC: 12 MMOL/L (ref 7–16)
AST SERPL-CCNC: 40 U/L (ref 0–31)
B.E.: -2.2 MMOL/L (ref -3–3)
BACTERIA: NORMAL /HPF
BARBITURATE SCREEN URINE: NOT DETECTED
BASOPHILS ABSOLUTE: 0.04 E9/L (ref 0–0.2)
BASOPHILS RELATIVE PERCENT: 0.3 % (ref 0–2)
BENZODIAZEPINE SCREEN, URINE: NOT DETECTED
BETA-HYDROXYBUTYRATE: 1.5 MMOL/L (ref 0.02–0.27)
BILIRUB SERPL-MCNC: 0.4 MG/DL (ref 0–1.2)
BILIRUBIN URINE: NEGATIVE
BLOOD, URINE: ABNORMAL
BUN BLDV-MCNC: 57 MG/DL (ref 6–20)
CALCIUM SERPL-MCNC: 8.7 MG/DL (ref 8.6–10.2)
CANNABINOID SCREEN URINE: NOT DETECTED
CHLORIDE BLD-SCNC: 107 MMOL/L (ref 98–107)
CLARITY: CLEAR
CO2: 22 MMOL/L (ref 22–29)
COCAINE METABOLITE SCREEN URINE: NOT DETECTED
COLOR: YELLOW
CREAT SERPL-MCNC: 3.8 MG/DL (ref 0.5–1)
DELIVERY SYSTEMS: NORMAL
DEVICE: NORMAL
EOSINOPHILS ABSOLUTE: 0.03 E9/L (ref 0.05–0.5)
EOSINOPHILS RELATIVE PERCENT: 0.2 % (ref 0–6)
EPITHELIAL CELLS, UA: NORMAL /HPF
FIO2 ARTERIAL: 100
GFR AFRICAN AMERICAN: 17
GFR NON-AFRICAN AMERICAN: 17 ML/MIN/1.73
GLUCOSE BLD-MCNC: 232 MG/DL (ref 74–99)
GLUCOSE URINE: 250 MG/DL
HCO3 ARTERIAL: 22.1 MMOL/L (ref 22–26)
HCT VFR BLD CALC: 26.8 % (ref 34–48)
HEMOGLOBIN: 8.4 G/DL (ref 11.5–15.5)
IMMATURE GRANULOCYTES #: 0.05 E9/L
IMMATURE GRANULOCYTES %: 0.4 % (ref 0–5)
KETONES, URINE: NEGATIVE MG/DL
LACTIC ACID, SEPSIS: 0.9 MMOL/L (ref 0.5–1.9)
LEUKOCYTE ESTERASE, URINE: NEGATIVE
LYMPHOCYTES ABSOLUTE: 1.11 E9/L (ref 1.5–4)
LYMPHOCYTES RELATIVE PERCENT: 9.1 % (ref 20–42)
Lab: NORMAL
MAGNESIUM: 2.1 MG/DL (ref 1.6–2.6)
MCH RBC QN AUTO: 31.2 PG (ref 26–35)
MCHC RBC AUTO-ENTMCNC: 31.3 % (ref 32–34.5)
MCV RBC AUTO: 99.6 FL (ref 80–99.9)
METHADONE SCREEN, URINE: NOT DETECTED
MONOCYTES ABSOLUTE: 0.3 E9/L (ref 0.1–0.95)
MONOCYTES RELATIVE PERCENT: 2.5 % (ref 2–12)
NEUTROPHILS ABSOLUTE: 10.64 E9/L (ref 1.8–7.3)
NEUTROPHILS RELATIVE PERCENT: 87.5 % (ref 43–80)
NITRITE, URINE: NEGATIVE
O2 SATURATION: 97.2 % (ref 92–98.5)
OPERATOR ID: 1991
OPIATE SCREEN URINE: NOT DETECTED
PCO2 ARTERIAL: 35 MMHG (ref 35–45)
PDW BLD-RTO: 19.3 FL (ref 11.5–15)
PH BLOOD GAS: 7.41 (ref 7.35–7.45)
PH UA: 8 (ref 5–9)
PHENCYCLIDINE SCREEN URINE: NOT DETECTED
PLATELET # BLD: 135 E9/L (ref 130–450)
PMV BLD AUTO: 10.7 FL (ref 7–12)
PO2 ARTERIAL: 91.5 MMHG (ref 80–100)
POSITIVE END EXP PRESS: 8 CMH2O
POTASSIUM SERPL-SCNC: 5.7 MMOL/L (ref 3.5–5)
PRO-BNP: ABNORMAL PG/ML (ref 0–125)
PROPOXYPHENE SCREEN: NOT DETECTED
PROTEIN UA: >=300 MG/DL
RBC # BLD: 2.69 E12/L (ref 3.5–5.5)
RBC UA: NORMAL /HPF (ref 0–2)
SODIUM BLD-SCNC: 141 MMOL/L (ref 132–146)
SOURCE, BLOOD GAS: NORMAL
SPECIFIC GRAVITY UA: 1.02 (ref 1–1.03)
TOTAL PROTEIN: 6.1 G/DL (ref 6.4–8.3)
TROPONIN: 0.03 NG/ML (ref 0–0.03)
UROBILINOGEN, URINE: 0.2 E.U./DL
WBC # BLD: 12.2 E9/L (ref 4.5–11.5)
WBC UA: NORMAL /HPF (ref 0–5)

## 2019-10-29 PROCEDURE — 6360000002 HC RX W HCPCS: Performed by: EMERGENCY MEDICINE

## 2019-10-29 PROCEDURE — 85025 COMPLETE CBC W/AUTO DIFF WBC: CPT

## 2019-10-29 PROCEDURE — 87040 BLOOD CULTURE FOR BACTERIA: CPT

## 2019-10-29 PROCEDURE — 81001 URINALYSIS AUTO W/SCOPE: CPT

## 2019-10-29 PROCEDURE — 2500000003 HC RX 250 WO HCPCS: Performed by: EMERGENCY MEDICINE

## 2019-10-29 PROCEDURE — 83880 ASSAY OF NATRIURETIC PEPTIDE: CPT

## 2019-10-29 PROCEDURE — 99285 EMERGENCY DEPT VISIT HI MDM: CPT

## 2019-10-29 PROCEDURE — 82803 BLOOD GASES ANY COMBINATION: CPT

## 2019-10-29 PROCEDURE — 96365 THER/PROPH/DIAG IV INF INIT: CPT

## 2019-10-29 PROCEDURE — 83605 ASSAY OF LACTIC ACID: CPT

## 2019-10-29 PROCEDURE — 84484 ASSAY OF TROPONIN QUANT: CPT

## 2019-10-29 PROCEDURE — 36600 WITHDRAWAL OF ARTERIAL BLOOD: CPT

## 2019-10-29 PROCEDURE — 93005 ELECTROCARDIOGRAM TRACING: CPT | Performed by: EMERGENCY MEDICINE

## 2019-10-29 PROCEDURE — 94660 CPAP INITIATION&MGMT: CPT

## 2019-10-29 PROCEDURE — 99291 CRITICAL CARE FIRST HOUR: CPT | Performed by: NURSE PRACTITIONER

## 2019-10-29 PROCEDURE — 80307 DRUG TEST PRSMV CHEM ANLYZR: CPT

## 2019-10-29 PROCEDURE — 2580000003 HC RX 258: Performed by: EMERGENCY MEDICINE

## 2019-10-29 PROCEDURE — 36415 COLL VENOUS BLD VENIPUNCTURE: CPT

## 2019-10-29 PROCEDURE — 2000000000 HC ICU R&B

## 2019-10-29 PROCEDURE — 70450 CT HEAD/BRAIN W/O DYE: CPT

## 2019-10-29 PROCEDURE — 71045 X-RAY EXAM CHEST 1 VIEW: CPT

## 2019-10-29 PROCEDURE — 6370000000 HC RX 637 (ALT 250 FOR IP): Performed by: EMERGENCY MEDICINE

## 2019-10-29 PROCEDURE — 80053 COMPREHEN METABOLIC PANEL: CPT

## 2019-10-29 PROCEDURE — 96375 TX/PRO/DX INJ NEW DRUG ADDON: CPT

## 2019-10-29 PROCEDURE — 82010 KETONE BODYS QUAN: CPT

## 2019-10-29 PROCEDURE — G0480 DRUG TEST DEF 1-7 CLASSES: HCPCS

## 2019-10-29 PROCEDURE — 83735 ASSAY OF MAGNESIUM: CPT

## 2019-10-29 RX ORDER — NALOXONE HYDROCHLORIDE 1 MG/ML
1 INJECTION INTRAMUSCULAR; INTRAVENOUS; SUBCUTANEOUS ONCE
Status: COMPLETED | OUTPATIENT
Start: 2019-10-29 | End: 2019-10-29

## 2019-10-29 RX ORDER — FUROSEMIDE 10 MG/ML
40 INJECTION INTRAMUSCULAR; INTRAVENOUS 2 TIMES DAILY
Status: DISCONTINUED | OUTPATIENT
Start: 2019-10-30 | End: 2019-11-01

## 2019-10-29 RX ORDER — INSULIN GLARGINE 100 [IU]/ML
8 INJECTION, SOLUTION SUBCUTANEOUS ONCE
Status: COMPLETED | OUTPATIENT
Start: 2019-10-29 | End: 2019-10-29

## 2019-10-29 RX ORDER — INSULIN GLARGINE 100 [IU]/ML
8 INJECTION, SOLUTION SUBCUTANEOUS DAILY
Status: DISCONTINUED | OUTPATIENT
Start: 2019-10-30 | End: 2019-10-30

## 2019-10-29 RX ORDER — FUROSEMIDE 10 MG/ML
INJECTION INTRAMUSCULAR; INTRAVENOUS
Status: DISPENSED
Start: 2019-10-29 | End: 2019-10-30

## 2019-10-29 RX ORDER — NITROGLYCERIN 20 MG/100ML
10 INJECTION INTRAVENOUS CONTINUOUS
Status: DISCONTINUED | OUTPATIENT
Start: 2019-10-29 | End: 2019-11-01

## 2019-10-29 RX ORDER — FUROSEMIDE 10 MG/ML
40 INJECTION INTRAMUSCULAR; INTRAVENOUS ONCE
Status: COMPLETED | OUTPATIENT
Start: 2019-10-29 | End: 2019-10-29

## 2019-10-29 RX ADMIN — VANCOMYCIN HYDROCHLORIDE 1500 MG: 10 INJECTION, POWDER, LYOPHILIZED, FOR SOLUTION INTRAVENOUS at 22:20

## 2019-10-29 RX ADMIN — NALOXONE HYDROCHLORIDE 1 MG: 1 INJECTION PARENTERAL at 19:37

## 2019-10-29 RX ADMIN — FUROSEMIDE 40 MG: 10 INJECTION, SOLUTION INTRAMUSCULAR; INTRAVENOUS at 19:38

## 2019-10-29 RX ADMIN — CEFEPIME HYDROCHLORIDE 1 G: 1 INJECTION, POWDER, FOR SOLUTION INTRAMUSCULAR; INTRAVENOUS at 21:47

## 2019-10-29 RX ADMIN — METRONIDAZOLE 500 MG: 500 INJECTION, SOLUTION INTRAVENOUS at 21:14

## 2019-10-29 RX ADMIN — INSULIN GLARGINE 8 UNITS: 100 INJECTION, SOLUTION SUBCUTANEOUS at 21:50

## 2019-10-29 ASSESSMENT — PAIN SCALES - GENERAL: PAINLEVEL_OUTOF10: 0

## 2019-10-30 ENCOUNTER — APPOINTMENT (OUTPATIENT)
Dept: ULTRASOUND IMAGING | Age: 29
DRG: 710 | End: 2019-10-30
Payer: COMMERCIAL

## 2019-10-30 PROBLEM — N18.9 ACUTE KIDNEY INJURY SUPERIMPOSED ON CKD (HCC): Status: ACTIVE | Noted: 2018-12-14

## 2019-10-30 LAB
ABO/RH: NORMAL
ACETAMINOPHEN LEVEL: <5 MCG/ML (ref 10–30)
ALBUMIN SERPL-MCNC: 2.8 G/DL (ref 3.5–5.2)
ALP BLD-CCNC: 236 U/L (ref 35–104)
ALT SERPL-CCNC: 60 U/L (ref 0–32)
ANION GAP SERPL CALCULATED.3IONS-SCNC: 11 MMOL/L (ref 7–16)
ANION GAP SERPL CALCULATED.3IONS-SCNC: 11 MMOL/L (ref 7–16)
ANISOCYTOSIS: ABNORMAL
ANISOCYTOSIS: ABNORMAL
ANTIBODY SCREEN: NORMAL
AST SERPL-CCNC: 44 U/L (ref 0–31)
B.E.: -1.2 MMOL/L (ref -3–3)
B.E.: -3.9 MMOL/L (ref -3–3)
BASOPHILS ABSOLUTE: 0 E9/L (ref 0–0.2)
BASOPHILS ABSOLUTE: 0.02 E9/L (ref 0–0.2)
BASOPHILS ABSOLUTE: 0.08 E9/L (ref 0–0.2)
BASOPHILS RELATIVE PERCENT: 0.2 % (ref 0–2)
BASOPHILS RELATIVE PERCENT: 0.3 % (ref 0–2)
BASOPHILS RELATIVE PERCENT: 0.9 % (ref 0–2)
BILIRUB SERPL-MCNC: 0.7 MG/DL (ref 0–1.2)
BUN BLDV-MCNC: 58 MG/DL (ref 6–20)
BUN BLDV-MCNC: 59 MG/DL (ref 6–20)
CALCIUM SERPL-MCNC: 8.5 MG/DL (ref 8.6–10.2)
CALCIUM SERPL-MCNC: 8.6 MG/DL (ref 8.6–10.2)
CHLORIDE BLD-SCNC: 107 MMOL/L (ref 98–107)
CHLORIDE BLD-SCNC: 108 MMOL/L (ref 98–107)
CO2: 24 MMOL/L (ref 22–29)
CO2: 25 MMOL/L (ref 22–29)
COHB: 0.5 % (ref 0–1.5)
COHB: 0.6 % (ref 0–1.5)
CREAT SERPL-MCNC: 3.7 MG/DL (ref 0.5–1)
CREAT SERPL-MCNC: 3.9 MG/DL (ref 0.5–1)
CRITICAL: ABNORMAL
CRITICAL: ABNORMAL
D DIMER: 730 NG/ML DDU
DATE ANALYZED: ABNORMAL
DATE ANALYZED: ABNORMAL
DATE OF COLLECTION: ABNORMAL
DATE OF COLLECTION: ABNORMAL
EOSINOPHILS ABSOLUTE: 0 E9/L (ref 0.05–0.5)
EOSINOPHILS ABSOLUTE: 0.03 E9/L (ref 0.05–0.5)
EOSINOPHILS ABSOLUTE: 0.22 E9/L (ref 0.05–0.5)
EOSINOPHILS RELATIVE PERCENT: 0.2 % (ref 0–6)
EOSINOPHILS RELATIVE PERCENT: 0.3 % (ref 0–6)
EOSINOPHILS RELATIVE PERCENT: 2.6 % (ref 0–6)
ETHANOL: <10 MG/DL (ref 0–0.08)
FIO2: 100 %
FIO2: 60 %
GFR AFRICAN AMERICAN: 17
GFR AFRICAN AMERICAN: 18
GFR NON-AFRICAN AMERICAN: 17 ML/MIN/1.73
GFR NON-AFRICAN AMERICAN: 18 ML/MIN/1.73
GLUCOSE BLD-MCNC: 104 MG/DL (ref 74–99)
GLUCOSE BLD-MCNC: 93 MG/DL (ref 74–99)
HAPTOGLOBIN: 71 MG/DL (ref 30–200)
HCO3: 20.5 MMOL/L (ref 22–26)
HCO3: 23.5 MMOL/L (ref 22–26)
HCT VFR BLD CALC: 20.9 % (ref 34–48)
HCT VFR BLD CALC: 21.1 % (ref 34–48)
HCT VFR BLD CALC: 24.1 % (ref 34–48)
HEMOGLOBIN: 6.5 G/DL (ref 11.5–15.5)
HEMOGLOBIN: 6.6 G/DL (ref 11.5–15.5)
HEMOGLOBIN: 7.5 G/DL (ref 11.5–15.5)
HHB: 12.6 % (ref 0–5)
HHB: 8.6 % (ref 0–5)
HYPOCHROMIA: ABNORMAL
IMMATURE GRANULOCYTES #: 0.06 E9/L
IMMATURE GRANULOCYTES %: 0.7 % (ref 0–5)
INFLUENZA A BY PCR: NOT DETECTED
INFLUENZA B BY PCR: NOT DETECTED
LAB: ABNORMAL
LAB: ABNORMAL
LACTIC ACID, SEPSIS: 1.1 MMOL/L (ref 0.5–1.9)
LYMPHOCYTES ABSOLUTE: 0.44 E9/L (ref 1.5–4)
LYMPHOCYTES ABSOLUTE: 0.76 E9/L (ref 1.5–4)
LYMPHOCYTES ABSOLUTE: 0.77 E9/L (ref 1.5–4)
LYMPHOCYTES RELATIVE PERCENT: 5.2 % (ref 20–42)
LYMPHOCYTES RELATIVE PERCENT: 8.7 % (ref 20–42)
LYMPHOCYTES RELATIVE PERCENT: 8.8 % (ref 20–42)
Lab: ABNORMAL
Lab: ABNORMAL
MCH RBC QN AUTO: 30.4 PG (ref 26–35)
MCH RBC QN AUTO: 30.6 PG (ref 26–35)
MCH RBC QN AUTO: 30.8 PG (ref 26–35)
MCHC RBC AUTO-ENTMCNC: 31.1 % (ref 32–34.5)
MCHC RBC AUTO-ENTMCNC: 31.1 % (ref 32–34.5)
MCHC RBC AUTO-ENTMCNC: 31.3 % (ref 32–34.5)
MCV RBC AUTO: 97.7 FL (ref 80–99.9)
MCV RBC AUTO: 98.4 FL (ref 80–99.9)
MCV RBC AUTO: 98.6 FL (ref 80–99.9)
METER GLUCOSE: 144 MG/DL (ref 74–99)
METER GLUCOSE: 163 MG/DL (ref 74–99)
METER GLUCOSE: 174 MG/DL (ref 74–99)
METER GLUCOSE: 297 MG/DL (ref 74–99)
METER GLUCOSE: 43 MG/DL (ref 74–99)
METER GLUCOSE: 63 MG/DL (ref 74–99)
METER GLUCOSE: 63 MG/DL (ref 74–99)
METER GLUCOSE: 99 MG/DL (ref 74–99)
METHB: 0.1 % (ref 0–1.5)
METHB: 0.3 % (ref 0–1.5)
MODE: ABNORMAL
MODE: ABNORMAL
MONOCYTES ABSOLUTE: 0.18 E9/L (ref 0.1–0.95)
MONOCYTES ABSOLUTE: 0.25 E9/L (ref 0.1–0.95)
MONOCYTES ABSOLUTE: 0.34 E9/L (ref 0.1–0.95)
MONOCYTES RELATIVE PERCENT: 1.7 % (ref 2–12)
MONOCYTES RELATIVE PERCENT: 2.6 % (ref 2–12)
MONOCYTES RELATIVE PERCENT: 3.9 % (ref 2–12)
NEUTROPHILS ABSOLUTE: 7.14 E9/L (ref 1.8–7.3)
NEUTROPHILS ABSOLUTE: 7.53 E9/L (ref 1.8–7.3)
NEUTROPHILS ABSOLUTE: 8.18 E9/L (ref 1.8–7.3)
NEUTROPHILS RELATIVE PERCENT: 85.2 % (ref 43–80)
NEUTROPHILS RELATIVE PERCENT: 86.1 % (ref 43–80)
NEUTROPHILS RELATIVE PERCENT: 93 % (ref 43–80)
O2 CONTENT: 10.9 ML/DL
O2 CONTENT: 11.2 ML/DL
O2 SATURATION: 87.3 % (ref 92–98.5)
O2 SATURATION: 91.3 % (ref 92–98.5)
O2HB: 86.6 % (ref 94–97)
O2HB: 90.7 % (ref 94–97)
OPERATOR ID: 4001
OPERATOR ID: 901
OVALOCYTES: ABNORMAL
OVALOCYTES: ABNORMAL
PATIENT TEMP: 37
PATIENT TEMP: 37 C
PCO2: 34.7 MMHG (ref 35–45)
PCO2: 39.1 MMHG (ref 35–45)
PDW BLD-RTO: 18.7 FL (ref 11.5–15)
PDW BLD-RTO: 18.8 FL (ref 11.5–15)
PDW BLD-RTO: 18.9 FL (ref 11.5–15)
PEEP/CPAP: 10 CMH2O
PEEP/CPAP: 5 CMH2O
PFO2: 0.64 MMHG/%
PFO2: 0.92 MMHG/%
PH BLOOD GAS: 7.39 (ref 7.35–7.45)
PH BLOOD GAS: 7.4 (ref 7.35–7.45)
PIP: 16 CMH2O
PLATELET # BLD: 112 E9/L (ref 130–450)
PLATELET # BLD: 122 E9/L (ref 130–450)
PLATELET # BLD: 125 E9/L (ref 130–450)
PMV BLD AUTO: 10.3 FL (ref 7–12)
PMV BLD AUTO: 11.1 FL (ref 7–12)
PMV BLD AUTO: 11.2 FL (ref 7–12)
PO2: 54.9 MMHG (ref 60–100)
PO2: 63.7 MMHG (ref 60–100)
POIKILOCYTES: ABNORMAL
POIKILOCYTES: ABNORMAL
POLYCHROMASIA: ABNORMAL
POLYCHROMASIA: ABNORMAL
POTASSIUM SERPL-SCNC: 4.9 MMOL/L (ref 3.5–5)
POTASSIUM SERPL-SCNC: 5 MMOL/L (ref 3.5–5)
PROCALCITONIN: 3.24 NG/ML (ref 0–0.08)
PS: 11 CMH20
PS: 16 CMH20
RBC # BLD: 2.14 E12/L (ref 3.5–5.5)
RBC # BLD: 2.14 E12/L (ref 3.5–5.5)
RBC # BLD: 2.45 E12/L (ref 3.5–5.5)
RI(T): 9.19
RR MECHANICAL: 8 B/MIN
SALICYLATE, SERUM: <0.3 MG/DL (ref 0–30)
SODIUM BLD-SCNC: 143 MMOL/L (ref 132–146)
SODIUM BLD-SCNC: 143 MMOL/L (ref 132–146)
SOURCE, BLOOD GAS: ABNORMAL
SOURCE, BLOOD GAS: ABNORMAL
TARGET CELLS: ABNORMAL
THB: 8.7 G/DL (ref 11.5–16.5)
THB: 8.9 G/DL (ref 11.5–16.5)
TIME ANALYZED: 118
TIME ANALYZED: 751
TOTAL PROTEIN: 5.4 G/DL (ref 6.4–8.3)
TOXIC GRANULATION: ABNORMAL
TRICYCLIC ANTIDEPRESSANTS SCREEN SERUM: NEGATIVE NG/ML
VANCOMYCIN RANDOM: 25.2 MCG/ML (ref 5–40)
WBC # BLD: 8.4 E9/L (ref 4.5–11.5)
WBC # BLD: 8.8 E9/L (ref 4.5–11.5)
WBC # BLD: 8.8 E9/L (ref 4.5–11.5)

## 2019-10-30 PROCEDURE — 2500000003 HC RX 250 WO HCPCS: Performed by: INTERNAL MEDICINE

## 2019-10-30 PROCEDURE — 83605 ASSAY OF LACTIC ACID: CPT

## 2019-10-30 PROCEDURE — 87798 DETECT AGENT NOS DNA AMP: CPT

## 2019-10-30 PROCEDURE — 6370000000 HC RX 637 (ALT 250 FOR IP): Performed by: INTERNAL MEDICINE

## 2019-10-30 PROCEDURE — 87040 BLOOD CULTURE FOR BACTERIA: CPT

## 2019-10-30 PROCEDURE — 85378 FIBRIN DEGRADE SEMIQUANT: CPT

## 2019-10-30 PROCEDURE — 82805 BLOOD GASES W/O2 SATURATION: CPT

## 2019-10-30 PROCEDURE — 86923 COMPATIBILITY TEST ELECTRIC: CPT

## 2019-10-30 PROCEDURE — 80048 BASIC METABOLIC PNL TOTAL CA: CPT

## 2019-10-30 PROCEDURE — P9016 RBC LEUKOCYTES REDUCED: HCPCS

## 2019-10-30 PROCEDURE — 87581 M.PNEUMON DNA AMP PROBE: CPT

## 2019-10-30 PROCEDURE — 6360000002 HC RX W HCPCS: Performed by: NURSE PRACTITIONER

## 2019-10-30 PROCEDURE — 36415 COLL VENOUS BLD VENIPUNCTURE: CPT

## 2019-10-30 PROCEDURE — 2580000003 HC RX 258: Performed by: INTERNAL MEDICINE

## 2019-10-30 PROCEDURE — 99233 SBSQ HOSP IP/OBS HIGH 50: CPT | Performed by: INTERNAL MEDICINE

## 2019-10-30 PROCEDURE — 6370000000 HC RX 637 (ALT 250 FOR IP): Performed by: NURSE PRACTITIONER

## 2019-10-30 PROCEDURE — 87486 CHLMYD PNEUM DNA AMP PROBE: CPT

## 2019-10-30 PROCEDURE — 93308 TTE F-UP OR LMTD: CPT

## 2019-10-30 PROCEDURE — 87502 INFLUENZA DNA AMP PROBE: CPT

## 2019-10-30 PROCEDURE — 86738 MYCOPLASMA ANTIBODY: CPT

## 2019-10-30 PROCEDURE — 85025 COMPLETE CBC W/AUTO DIFF WBC: CPT

## 2019-10-30 PROCEDURE — 94660 CPAP INITIATION&MGMT: CPT

## 2019-10-30 PROCEDURE — 80053 COMPREHEN METABOLIC PANEL: CPT

## 2019-10-30 PROCEDURE — 36600 WITHDRAWAL OF ARTERIAL BLOOD: CPT

## 2019-10-30 PROCEDURE — 6360000002 HC RX W HCPCS: Performed by: INTERNAL MEDICINE

## 2019-10-30 PROCEDURE — 2000000000 HC ICU R&B

## 2019-10-30 PROCEDURE — 76705 ECHO EXAM OF ABDOMEN: CPT

## 2019-10-30 PROCEDURE — 86850 RBC ANTIBODY SCREEN: CPT

## 2019-10-30 PROCEDURE — 83010 ASSAY OF HAPTOGLOBIN QUANT: CPT

## 2019-10-30 PROCEDURE — 86901 BLOOD TYPING SEROLOGIC RH(D): CPT

## 2019-10-30 PROCEDURE — 36591 DRAW BLOOD OFF VENOUS DEVICE: CPT

## 2019-10-30 PROCEDURE — 80202 ASSAY OF VANCOMYCIN: CPT

## 2019-10-30 PROCEDURE — 84145 PROCALCITONIN (PCT): CPT

## 2019-10-30 PROCEDURE — 87450 HC DIRECT STREP B ANTIGEN: CPT

## 2019-10-30 PROCEDURE — 87081 CULTURE SCREEN ONLY: CPT

## 2019-10-30 PROCEDURE — 86900 BLOOD TYPING SEROLOGIC ABO: CPT

## 2019-10-30 PROCEDURE — 87633 RESP VIRUS 12-25 TARGETS: CPT

## 2019-10-30 PROCEDURE — 2580000003 HC RX 258: Performed by: NURSE PRACTITIONER

## 2019-10-30 PROCEDURE — 82962 GLUCOSE BLOOD TEST: CPT

## 2019-10-30 RX ORDER — INSULIN GLARGINE 100 [IU]/ML
4 INJECTION, SOLUTION SUBCUTANEOUS NIGHTLY
Status: DISCONTINUED | OUTPATIENT
Start: 2019-10-31 | End: 2019-10-31

## 2019-10-30 RX ORDER — SODIUM CHLORIDE 0.9 % (FLUSH) 0.9 %
10 SYRINGE (ML) INJECTION PRN
Status: DISCONTINUED | OUTPATIENT
Start: 2019-10-30 | End: 2019-11-09 | Stop reason: HOSPADM

## 2019-10-30 RX ORDER — FUROSEMIDE 20 MG/1
20 TABLET ORAL DAILY
Status: ON HOLD | COMMUNITY
End: 2019-11-09 | Stop reason: HOSPADM

## 2019-10-30 RX ORDER — NICOTINE POLACRILEX 4 MG
15 LOZENGE BUCCAL PRN
Status: DISCONTINUED | OUTPATIENT
Start: 2019-10-30 | End: 2019-11-04 | Stop reason: SDUPTHER

## 2019-10-30 RX ORDER — DEXTROSE MONOHYDRATE 100 MG/ML
INJECTION, SOLUTION INTRAVENOUS CONTINUOUS
Status: DISCONTINUED | OUTPATIENT
Start: 2019-10-30 | End: 2019-11-01

## 2019-10-30 RX ORDER — HYDROMORPHONE HCL 110MG/55ML
0.4 PATIENT CONTROLLED ANALGESIA SYRINGE INTRAVENOUS EVERY 4 HOURS PRN
Status: DISCONTINUED | OUTPATIENT
Start: 2019-10-30 | End: 2019-11-01

## 2019-10-30 RX ORDER — AMITRIPTYLINE HYDROCHLORIDE 10 MG/1
10 TABLET, FILM COATED ORAL NIGHTLY
Status: DISCONTINUED | OUTPATIENT
Start: 2019-10-30 | End: 2019-11-09 | Stop reason: HOSPADM

## 2019-10-30 RX ORDER — NIFEDIPINE 30 MG/1
30 TABLET, FILM COATED, EXTENDED RELEASE ORAL DAILY
Status: ON HOLD | COMMUNITY
End: 2019-11-09 | Stop reason: HOSPADM

## 2019-10-30 RX ORDER — SODIUM CHLORIDE 0.9 % (FLUSH) 0.9 %
10 SYRINGE (ML) INJECTION EVERY 12 HOURS SCHEDULED
Status: DISCONTINUED | OUTPATIENT
Start: 2019-10-30 | End: 2019-11-09 | Stop reason: HOSPADM

## 2019-10-30 RX ORDER — ONDANSETRON 2 MG/ML
4 INJECTION INTRAMUSCULAR; INTRAVENOUS EVERY 6 HOURS PRN
Status: DISCONTINUED | OUTPATIENT
Start: 2019-10-30 | End: 2019-11-09 | Stop reason: HOSPADM

## 2019-10-30 RX ORDER — INSULIN GLARGINE 100 [IU]/ML
8 INJECTION, SOLUTION SUBCUTANEOUS NIGHTLY
Status: DISCONTINUED | OUTPATIENT
Start: 2019-10-31 | End: 2019-10-30

## 2019-10-30 RX ORDER — METOPROLOL TARTRATE 5 MG/5ML
5 INJECTION INTRAVENOUS EVERY 6 HOURS
Status: DISCONTINUED | OUTPATIENT
Start: 2019-10-30 | End: 2019-11-09 | Stop reason: HOSPADM

## 2019-10-30 RX ORDER — OSELTAMIVIR PHOSPHATE 30 MG/1
30 CAPSULE ORAL DAILY
Status: DISCONTINUED | OUTPATIENT
Start: 2019-10-30 | End: 2019-11-01

## 2019-10-30 RX ORDER — LORAZEPAM 0.5 MG/1
0.5 TABLET ORAL 2 TIMES DAILY PRN
Status: DISCONTINUED | OUTPATIENT
Start: 2019-10-30 | End: 2019-11-09 | Stop reason: HOSPADM

## 2019-10-30 RX ORDER — DEXTROSE MONOHYDRATE 50 MG/ML
100 INJECTION, SOLUTION INTRAVENOUS PRN
Status: DISCONTINUED | OUTPATIENT
Start: 2019-10-30 | End: 2019-11-04 | Stop reason: SDUPTHER

## 2019-10-30 RX ORDER — CARVEDILOL 25 MG/1
25 TABLET ORAL 2 TIMES DAILY WITH MEALS
Status: ON HOLD | COMMUNITY
End: 2019-11-09 | Stop reason: HOSPADM

## 2019-10-30 RX ORDER — DEXTROSE MONOHYDRATE 25 G/50ML
12.5 INJECTION, SOLUTION INTRAVENOUS PRN
Status: DISCONTINUED | OUTPATIENT
Start: 2019-10-30 | End: 2019-11-04 | Stop reason: SDUPTHER

## 2019-10-30 RX ORDER — HYDROMORPHONE HCL 110MG/55ML
0.2 PATIENT CONTROLLED ANALGESIA SYRINGE INTRAVENOUS EVERY 4 HOURS PRN
Status: DISCONTINUED | OUTPATIENT
Start: 2019-10-30 | End: 2019-11-01

## 2019-10-30 RX ORDER — CARVEDILOL 25 MG/1
25 TABLET ORAL 2 TIMES DAILY WITH MEALS
Status: DISCONTINUED | OUTPATIENT
Start: 2019-10-30 | End: 2019-11-01

## 2019-10-30 RX ORDER — ACETAMINOPHEN 325 MG/1
650 TABLET ORAL EVERY 4 HOURS PRN
Status: DISCONTINUED | OUTPATIENT
Start: 2019-10-30 | End: 2019-11-09 | Stop reason: HOSPADM

## 2019-10-30 RX ORDER — NIFEDIPINE 30 MG/1
30 TABLET, EXTENDED RELEASE ORAL DAILY
Status: DISCONTINUED | OUTPATIENT
Start: 2019-10-30 | End: 2019-11-01

## 2019-10-30 RX ORDER — FAMOTIDINE 20 MG/1
20 TABLET, FILM COATED ORAL
Status: DISCONTINUED | OUTPATIENT
Start: 2019-10-30 | End: 2019-11-01

## 2019-10-30 RX ORDER — LORAZEPAM 0.5 MG/1
0.5 TABLET ORAL 2 TIMES DAILY PRN
COMMUNITY
End: 2020-11-20

## 2019-10-30 RX ADMIN — ACETAMINOPHEN 650 MG: 325 TABLET, FILM COATED ORAL at 16:03

## 2019-10-30 RX ADMIN — METOPROLOL TARTRATE 5 MG: 5 INJECTION, SOLUTION INTRAVENOUS at 16:34

## 2019-10-30 RX ADMIN — HYDROMORPHONE HYDROCHLORIDE 0.4 MG: 2 INJECTION INTRAMUSCULAR; INTRAVENOUS; SUBCUTANEOUS at 21:42

## 2019-10-30 RX ADMIN — Medication 10 ML: at 21:43

## 2019-10-30 RX ADMIN — HYDROMORPHONE HYDROCHLORIDE 0.2 MG: 2 INJECTION INTRAMUSCULAR; INTRAVENOUS; SUBCUTANEOUS at 16:02

## 2019-10-30 RX ADMIN — FUROSEMIDE 40 MG: 10 INJECTION, SOLUTION INTRAMUSCULAR; INTRAVENOUS at 18:28

## 2019-10-30 RX ADMIN — INSULIN LISPRO 6 UNITS: 100 INJECTION, SOLUTION INTRAVENOUS; SUBCUTANEOUS at 00:42

## 2019-10-30 RX ADMIN — FUROSEMIDE 40 MG: 10 INJECTION, SOLUTION INTRAMUSCULAR; INTRAVENOUS at 09:04

## 2019-10-30 RX ADMIN — OSELTAMIVIR PHOSPHATE 30 MG: 30 CAPSULE ORAL at 16:06

## 2019-10-30 RX ADMIN — DEXTROSE MONOHYDRATE 12.5 G: 500 INJECTION PARENTERAL at 04:53

## 2019-10-30 RX ADMIN — PIPERACILLIN SODIUM AND TAZOBACTAM SODIUM 3.38 G: 3; .375 INJECTION, POWDER, LYOPHILIZED, FOR SOLUTION INTRAVENOUS at 20:55

## 2019-10-30 RX ADMIN — PIPERACILLIN SODIUM AND TAZOBACTAM SODIUM 3.38 G: 3; .375 INJECTION, POWDER, LYOPHILIZED, FOR SOLUTION INTRAVENOUS at 00:43

## 2019-10-30 RX ADMIN — METOPROLOL TARTRATE 5 MG: 5 INJECTION, SOLUTION INTRAVENOUS at 11:24

## 2019-10-30 RX ADMIN — DEXTROSE MONOHYDRATE: 100 INJECTION, SOLUTION INTRAVENOUS at 09:04

## 2019-10-30 RX ADMIN — METOPROLOL TARTRATE 5 MG: 5 INJECTION, SOLUTION INTRAVENOUS at 21:43

## 2019-10-30 RX ADMIN — PIPERACILLIN SODIUM AND TAZOBACTAM SODIUM 3.38 G: 3; .375 INJECTION, POWDER, LYOPHILIZED, FOR SOLUTION INTRAVENOUS at 09:03

## 2019-10-30 ASSESSMENT — PAIN SCALES - GENERAL
PAINLEVEL_OUTOF10: 10
PAINLEVEL_OUTOF10: 6
PAINLEVEL_OUTOF10: 0
PAINLEVEL_OUTOF10: 10
PAINLEVEL_OUTOF10: 6
PAINLEVEL_OUTOF10: 10

## 2019-10-30 ASSESSMENT — PAIN DESCRIPTION - LOCATION
LOCATION: GENERALIZED
LOCATION: BACK;ABDOMEN

## 2019-10-30 ASSESSMENT — PAIN DESCRIPTION - ONSET
ONSET: ON-GOING
ONSET: ON-GOING

## 2019-10-30 ASSESSMENT — PAIN DESCRIPTION - PAIN TYPE
TYPE: ACUTE PAIN
TYPE: ACUTE PAIN

## 2019-10-30 ASSESSMENT — PAIN DESCRIPTION - DESCRIPTORS
DESCRIPTORS: ACHING;DISCOMFORT;DULL
DESCRIPTORS: ACHING;DISCOMFORT;DULL

## 2019-10-30 ASSESSMENT — PAIN DESCRIPTION - FREQUENCY
FREQUENCY: CONTINUOUS
FREQUENCY: CONTINUOUS

## 2019-10-30 ASSESSMENT — PAIN DESCRIPTION - ORIENTATION: ORIENTATION: LOWER

## 2019-10-30 ASSESSMENT — PAIN DESCRIPTION - PROGRESSION
CLINICAL_PROGRESSION: GRADUALLY WORSENING
CLINICAL_PROGRESSION: NOT CHANGED

## 2019-10-31 ENCOUNTER — APPOINTMENT (OUTPATIENT)
Dept: NUCLEAR MEDICINE | Age: 29
DRG: 710 | End: 2019-10-31
Payer: COMMERCIAL

## 2019-10-31 ENCOUNTER — APPOINTMENT (OUTPATIENT)
Dept: GENERAL RADIOLOGY | Age: 29
DRG: 710 | End: 2019-10-31
Payer: COMMERCIAL

## 2019-10-31 LAB
ANION GAP SERPL CALCULATED.3IONS-SCNC: 14 MMOL/L (ref 7–16)
ANISOCYTOSIS: ABNORMAL
BASOPHILIC STIPPLING: ABNORMAL
BASOPHILS ABSOLUTE: 0.07 E9/L (ref 0–0.2)
BASOPHILS RELATIVE PERCENT: 0.9 % (ref 0–2)
BLOOD BANK DISPENSE STATUS: NORMAL
BLOOD BANK PRODUCT CODE: NORMAL
BPU ID: NORMAL
BUN BLDV-MCNC: 57 MG/DL (ref 6–20)
CALCIUM SERPL-MCNC: 8.3 MG/DL (ref 8.6–10.2)
CHLORIDE BLD-SCNC: 104 MMOL/L (ref 98–107)
CO2: 24 MMOL/L (ref 22–29)
CREAT SERPL-MCNC: 3.9 MG/DL (ref 0.5–1)
DESCRIPTION BLOOD BANK: NORMAL
EOSINOPHILS ABSOLUTE: 0 E9/L (ref 0.05–0.5)
EOSINOPHILS RELATIVE PERCENT: 0.7 % (ref 0–6)
FILM ARRAY ADENOVIRUS: NORMAL
FILM ARRAY BORDETELLA PERTUSSIS: NORMAL
FILM ARRAY CHLAMYDOPHILIA PNEUMONIAE: NORMAL
FILM ARRAY CORONAVIRUS 229E: NORMAL
FILM ARRAY CORONAVIRUS HKU1: NORMAL
FILM ARRAY CORONAVIRUS NL63: NORMAL
FILM ARRAY CORONAVIRUS OC43: NORMAL
FILM ARRAY INFLUENZA A VIRUS 09H1: NORMAL
FILM ARRAY INFLUENZA A VIRUS H1: NORMAL
FILM ARRAY INFLUENZA A VIRUS H3: NORMAL
FILM ARRAY INFLUENZA A VIRUS: NORMAL
FILM ARRAY INFLUENZA B: NORMAL
FILM ARRAY METAPNEUMOVIRUS: NORMAL
FILM ARRAY MYCOPLASMA PNEUMONIAE: NORMAL
FILM ARRAY PARAINFLUENZA VIRUS 1: NORMAL
FILM ARRAY PARAINFLUENZA VIRUS 2: NORMAL
FILM ARRAY PARAINFLUENZA VIRUS 3: NORMAL
FILM ARRAY PARAINFLUENZA VIRUS 4: NORMAL
FILM ARRAY RESPIRATORY SYNCITIAL VIRUS: NORMAL
FILM ARRAY RHINOVIRUS/ENTEROVIRUS: NORMAL
GFR AFRICAN AMERICAN: 17
GFR NON-AFRICAN AMERICAN: 17 ML/MIN/1.73
GLUCOSE BLD-MCNC: 98 MG/DL (ref 74–99)
HCT VFR BLD CALC: 18.9 % (ref 34–48)
HCT VFR BLD CALC: 24 % (ref 34–48)
HEMOGLOBIN: 5.9 G/DL (ref 11.5–15.5)
HEMOGLOBIN: 7.7 G/DL (ref 11.5–15.5)
HYPOCHROMIA: ABNORMAL
INR BLD: 1.5
L. PNEUMOPHILA SEROGP 1 UR AG: NORMAL
LYMPHOCYTES ABSOLUTE: 0.67 E9/L (ref 1.5–4)
LYMPHOCYTES RELATIVE PERCENT: 8.7 % (ref 20–42)
MCH RBC QN AUTO: 30.6 PG (ref 26–35)
MCH RBC QN AUTO: 30.6 PG (ref 26–35)
MCHC RBC AUTO-ENTMCNC: 31.2 % (ref 32–34.5)
MCHC RBC AUTO-ENTMCNC: 32.1 % (ref 32–34.5)
MCV RBC AUTO: 95.2 FL (ref 80–99.9)
MCV RBC AUTO: 97.9 FL (ref 80–99.9)
METER GLUCOSE: 140 MG/DL (ref 74–99)
METER GLUCOSE: 143 MG/DL (ref 74–99)
METER GLUCOSE: 165 MG/DL (ref 74–99)
METER GLUCOSE: 52 MG/DL (ref 74–99)
METER GLUCOSE: 73 MG/DL (ref 74–99)
METER GLUCOSE: 74 MG/DL (ref 74–99)
MONOCYTES ABSOLUTE: 0 E9/L (ref 0.1–0.95)
MONOCYTES RELATIVE PERCENT: 2.7 % (ref 2–12)
MRSA CULTURE ONLY: NORMAL
MYCOPLASMA PNEUMONIAE IGM: PRESENT
NEUTROPHILS ABSOLUTE: 6.66 E9/L (ref 1.8–7.3)
NEUTROPHILS RELATIVE PERCENT: 90.4 % (ref 43–80)
OVALOCYTES: ABNORMAL
PDW BLD-RTO: 18.8 FL (ref 11.5–15)
PDW BLD-RTO: 18.8 FL (ref 11.5–15)
PLATELET # BLD: 113 E9/L (ref 130–450)
PLATELET # BLD: 128 E9/L (ref 130–450)
PMV BLD AUTO: 10.6 FL (ref 7–12)
PMV BLD AUTO: 11.3 FL (ref 7–12)
POIKILOCYTES: ABNORMAL
POLYCHROMASIA: ABNORMAL
POTASSIUM SERPL-SCNC: 4.2 MMOL/L (ref 3.5–5)
PROTHROMBIN TIME: 17.1 SEC (ref 9.3–12.4)
RBC # BLD: 1.93 E12/L (ref 3.5–5.5)
RBC # BLD: 2.52 E12/L (ref 3.5–5.5)
SCHISTOCYTES: ABNORMAL
SODIUM BLD-SCNC: 142 MMOL/L (ref 132–146)
STREP PNEUMONIAE ANTIGEN, URINE: NORMAL
TEAR DROP CELLS: ABNORMAL
WBC # BLD: 7.4 E9/L (ref 4.5–11.5)
WBC # BLD: 7.7 E9/L (ref 4.5–11.5)

## 2019-10-31 PROCEDURE — 6370000000 HC RX 637 (ALT 250 FOR IP): Performed by: NURSE PRACTITIONER

## 2019-10-31 PROCEDURE — 85610 PROTHROMBIN TIME: CPT

## 2019-10-31 PROCEDURE — 6370000000 HC RX 637 (ALT 250 FOR IP): Performed by: INTERNAL MEDICINE

## 2019-10-31 PROCEDURE — 36430 TRANSFUSION BLD/BLD COMPNT: CPT

## 2019-10-31 PROCEDURE — 36592 COLLECT BLOOD FROM PICC: CPT

## 2019-10-31 PROCEDURE — 2000000000 HC ICU R&B

## 2019-10-31 PROCEDURE — 2580000003 HC RX 258: Performed by: INTERNAL MEDICINE

## 2019-10-31 PROCEDURE — 85027 COMPLETE CBC AUTOMATED: CPT

## 2019-10-31 PROCEDURE — 36591 DRAW BLOOD OFF VENOUS DEVICE: CPT

## 2019-10-31 PROCEDURE — 99253 IP/OBS CNSLTJ NEW/EST LOW 45: CPT | Performed by: SURGERY

## 2019-10-31 PROCEDURE — 82962 GLUCOSE BLOOD TEST: CPT

## 2019-10-31 PROCEDURE — 85025 COMPLETE CBC W/AUTO DIFF WBC: CPT

## 2019-10-31 PROCEDURE — P9016 RBC LEUKOCYTES REDUCED: HCPCS

## 2019-10-31 PROCEDURE — 94660 CPAP INITIATION&MGMT: CPT

## 2019-10-31 PROCEDURE — 2580000003 HC RX 258: Performed by: NURSE PRACTITIONER

## 2019-10-31 PROCEDURE — 2500000003 HC RX 250 WO HCPCS: Performed by: INTERNAL MEDICINE

## 2019-10-31 PROCEDURE — 99233 SBSQ HOSP IP/OBS HIGH 50: CPT | Performed by: INTERNAL MEDICINE

## 2019-10-31 PROCEDURE — 36415 COLL VENOUS BLD VENIPUNCTURE: CPT

## 2019-10-31 PROCEDURE — 6360000002 HC RX W HCPCS: Performed by: NURSE PRACTITIONER

## 2019-10-31 PROCEDURE — 71045 X-RAY EXAM CHEST 1 VIEW: CPT

## 2019-10-31 PROCEDURE — 86703 HIV-1/HIV-2 1 RESULT ANTBDY: CPT

## 2019-10-31 PROCEDURE — 6360000002 HC RX W HCPCS: Performed by: INTERNAL MEDICINE

## 2019-10-31 PROCEDURE — 80048 BASIC METABOLIC PNL TOTAL CA: CPT

## 2019-10-31 RX ORDER — 0.9 % SODIUM CHLORIDE 0.9 %
250 INTRAVENOUS SOLUTION INTRAVENOUS ONCE
Status: COMPLETED | OUTPATIENT
Start: 2019-10-31 | End: 2019-10-31

## 2019-10-31 RX ADMIN — ACETAMINOPHEN 650 MG: 325 TABLET, FILM COATED ORAL at 20:52

## 2019-10-31 RX ADMIN — FAMOTIDINE 20 MG: 20 TABLET ORAL at 05:28

## 2019-10-31 RX ADMIN — METOPROLOL TARTRATE 5 MG: 5 INJECTION, SOLUTION INTRAVENOUS at 15:26

## 2019-10-31 RX ADMIN — HYDROMORPHONE HYDROCHLORIDE 0.4 MG: 2 INJECTION INTRAMUSCULAR; INTRAVENOUS; SUBCUTANEOUS at 16:16

## 2019-10-31 RX ADMIN — HYDROMORPHONE HYDROCHLORIDE 0.2 MG: 2 INJECTION INTRAMUSCULAR; INTRAVENOUS; SUBCUTANEOUS at 08:10

## 2019-10-31 RX ADMIN — ACETAMINOPHEN 650 MG: 325 TABLET, FILM COATED ORAL at 01:45

## 2019-10-31 RX ADMIN — VANCOMYCIN HYDROCHLORIDE 1000 MG: 1 INJECTION, POWDER, LYOPHILIZED, FOR SOLUTION INTRAVENOUS at 05:31

## 2019-10-31 RX ADMIN — PIPERACILLIN SODIUM AND TAZOBACTAM SODIUM 3.38 G: 3; .375 INJECTION, POWDER, LYOPHILIZED, FOR SOLUTION INTRAVENOUS at 20:54

## 2019-10-31 RX ADMIN — ACETAMINOPHEN 650 MG: 325 TABLET, FILM COATED ORAL at 16:56

## 2019-10-31 RX ADMIN — DEXTROSE MONOHYDRATE 12.5 G: 500 INJECTION PARENTERAL at 12:22

## 2019-10-31 RX ADMIN — INSULIN LISPRO 2 UNITS: 100 INJECTION, SOLUTION INTRAVENOUS; SUBCUTANEOUS at 07:58

## 2019-10-31 RX ADMIN — METOPROLOL TARTRATE 5 MG: 5 INJECTION, SOLUTION INTRAVENOUS at 21:53

## 2019-10-31 RX ADMIN — PIPERACILLIN SODIUM AND TAZOBACTAM SODIUM 3.38 G: 3; .375 INJECTION, POWDER, LYOPHILIZED, FOR SOLUTION INTRAVENOUS at 12:10

## 2019-10-31 RX ADMIN — HYDROMORPHONE HYDROCHLORIDE 0.4 MG: 2 INJECTION INTRAMUSCULAR; INTRAVENOUS; SUBCUTANEOUS at 12:11

## 2019-10-31 RX ADMIN — HYDROMORPHONE HYDROCHLORIDE 0.4 MG: 2 INJECTION INTRAMUSCULAR; INTRAVENOUS; SUBCUTANEOUS at 20:18

## 2019-10-31 RX ADMIN — CALCIUM ACETATE 667 MG: 667 CAPSULE ORAL at 14:30

## 2019-10-31 RX ADMIN — OSELTAMIVIR PHOSPHATE 30 MG: 30 CAPSULE ORAL at 11:00

## 2019-10-31 RX ADMIN — Medication 10 ML: at 20:41

## 2019-10-31 RX ADMIN — SODIUM CHLORIDE 250 ML: 9 INJECTION, SOLUTION INTRAVENOUS at 09:30

## 2019-10-31 RX ADMIN — FUROSEMIDE 40 MG: 10 INJECTION, SOLUTION INTRAMUSCULAR; INTRAVENOUS at 17:28

## 2019-10-31 RX ADMIN — METOPROLOL TARTRATE 5 MG: 5 INJECTION, SOLUTION INTRAVENOUS at 12:11

## 2019-10-31 RX ADMIN — Medication 10 ML: at 04:31

## 2019-10-31 RX ADMIN — METOPROLOL TARTRATE 5 MG: 5 INJECTION, SOLUTION INTRAVENOUS at 04:31

## 2019-10-31 RX ADMIN — CALCIUM ACETATE 667 MG: 667 CAPSULE ORAL at 17:01

## 2019-10-31 RX ADMIN — ACETAMINOPHEN 650 MG: 325 TABLET, FILM COATED ORAL at 05:28

## 2019-10-31 RX ADMIN — HYDROMORPHONE HYDROCHLORIDE 0.4 MG: 2 INJECTION INTRAMUSCULAR; INTRAVENOUS; SUBCUTANEOUS at 01:45

## 2019-10-31 RX ADMIN — Medication 10 ML: at 08:11

## 2019-10-31 ASSESSMENT — PAIN SCALES - GENERAL
PAINLEVEL_OUTOF10: 10
PAINLEVEL_OUTOF10: 0
PAINLEVEL_OUTOF10: 6
PAINLEVEL_OUTOF10: 8
PAINLEVEL_OUTOF10: 10
PAINLEVEL_OUTOF10: 6
PAINLEVEL_OUTOF10: 6
PAINLEVEL_OUTOF10: 7
PAINLEVEL_OUTOF10: 8
PAINLEVEL_OUTOF10: 6

## 2019-10-31 ASSESSMENT — PAIN DESCRIPTION - LOCATION: LOCATION: HEAD

## 2019-10-31 ASSESSMENT — PAIN DESCRIPTION - DESCRIPTORS: DESCRIPTORS: ACHING;DISCOMFORT

## 2019-10-31 ASSESSMENT — PAIN DESCRIPTION - PAIN TYPE: TYPE: ACUTE PAIN

## 2019-11-01 ENCOUNTER — APPOINTMENT (OUTPATIENT)
Dept: GENERAL RADIOLOGY | Age: 29
DRG: 710 | End: 2019-11-01
Payer: COMMERCIAL

## 2019-11-01 ENCOUNTER — APPOINTMENT (OUTPATIENT)
Dept: CT IMAGING | Age: 29
DRG: 710 | End: 2019-11-01
Payer: COMMERCIAL

## 2019-11-01 LAB
ALBUMIN SERPL-MCNC: 2.1 G/DL (ref 3.5–5.2)
ALP BLD-CCNC: 215 U/L (ref 35–104)
ALT SERPL-CCNC: 38 U/L (ref 0–32)
AMORPHOUS: ABNORMAL
ANION GAP SERPL CALCULATED.3IONS-SCNC: 16 MMOL/L (ref 7–16)
ANION GAP SERPL CALCULATED.3IONS-SCNC: 18 MMOL/L (ref 7–16)
ANISOCYTOSIS: ABNORMAL
AST SERPL-CCNC: 21 U/L (ref 0–31)
B.E.: -4 MMOL/L (ref -3–3)
BACTERIA: ABNORMAL /HPF
BASOPHILS ABSOLUTE: 0.02 E9/L (ref 0–0.2)
BASOPHILS RELATIVE PERCENT: 0.3 % (ref 0–2)
BILIRUB SERPL-MCNC: 0.8 MG/DL (ref 0–1.2)
BILIRUBIN URINE: NEGATIVE
BLOOD, URINE: ABNORMAL
BUN BLDV-MCNC: 57 MG/DL (ref 6–20)
BUN BLDV-MCNC: 62 MG/DL (ref 6–20)
C-REACTIVE PROTEIN: 43.1 MG/DL (ref 0–0.4)
CALCIUM SERPL-MCNC: 8.4 MG/DL (ref 8.6–10.2)
CALCIUM SERPL-MCNC: 8.7 MG/DL (ref 8.6–10.2)
CHLORIDE BLD-SCNC: 100 MMOL/L (ref 98–107)
CHLORIDE BLD-SCNC: 101 MMOL/L (ref 98–107)
CLARITY: CLEAR
CO2: 21 MMOL/L (ref 22–29)
CO2: 22 MMOL/L (ref 22–29)
COHB: 0.6 % (ref 0–1.5)
COLOR: YELLOW
CREAT SERPL-MCNC: 3.6 MG/DL (ref 0.5–1)
CREAT SERPL-MCNC: 3.7 MG/DL (ref 0.5–1)
CREATININE URINE: 46 MG/DL (ref 29–226)
CRITICAL: ABNORMAL
DATE ANALYZED: ABNORMAL
DATE OF COLLECTION: ABNORMAL
EOSINOPHILS ABSOLUTE: 0.28 E9/L (ref 0.05–0.5)
EOSINOPHILS RELATIVE PERCENT: 3.7 % (ref 0–6)
EPITHELIAL CELLS, UA: ABNORMAL /HPF
FIO2: 80 %
GFR AFRICAN AMERICAN: 18
GFR AFRICAN AMERICAN: 18
GFR NON-AFRICAN AMERICAN: 18 ML/MIN/1.73
GFR NON-AFRICAN AMERICAN: 18 ML/MIN/1.73
GLUCOSE BLD-MCNC: 171 MG/DL (ref 74–99)
GLUCOSE BLD-MCNC: 197 MG/DL (ref 74–99)
GLUCOSE URINE: NEGATIVE MG/DL
HCO3: 21.4 MMOL/L (ref 22–26)
HCT VFR BLD CALC: 24.9 % (ref 34–48)
HEMOGLOBIN: 7.8 G/DL (ref 11.5–15.5)
HHB: 4.2 % (ref 0–5)
HIV-1 AND HIV-2 ANTIBODIES: NORMAL
HYPOCHROMIA: ABNORMAL
IMMATURE GRANULOCYTES #: 0.05 E9/L
IMMATURE GRANULOCYTES %: 0.7 % (ref 0–5)
KETONES, URINE: ABNORMAL MG/DL
LAB: ABNORMAL
LEUKOCYTE ESTERASE, URINE: ABNORMAL
LYMPHOCYTES ABSOLUTE: 0.7 E9/L (ref 1.5–4)
LYMPHOCYTES RELATIVE PERCENT: 9.2 % (ref 20–42)
Lab: ABNORMAL
MCH RBC QN AUTO: 29.8 PG (ref 26–35)
MCHC RBC AUTO-ENTMCNC: 31.3 % (ref 32–34.5)
MCV RBC AUTO: 95 FL (ref 80–99.9)
METER GLUCOSE: 107 MG/DL (ref 74–99)
METER GLUCOSE: 141 MG/DL (ref 74–99)
METER GLUCOSE: 161 MG/DL (ref 74–99)
METER GLUCOSE: 168 MG/DL (ref 74–99)
METER GLUCOSE: 176 MG/DL (ref 74–99)
METER GLUCOSE: 193 MG/DL (ref 74–99)
METER GLUCOSE: 207 MG/DL (ref 74–99)
METHB: 0.2 % (ref 0–1.5)
MODE: ABNORMAL
MONOCYTES ABSOLUTE: 0.27 E9/L (ref 0.1–0.95)
MONOCYTES RELATIVE PERCENT: 3.6 % (ref 2–12)
NEUTROPHILS ABSOLUTE: 6.26 E9/L (ref 1.8–7.3)
NEUTROPHILS RELATIVE PERCENT: 82.5 % (ref 43–80)
NITRITE, URINE: NEGATIVE
O2 CONTENT: 11.9 ML/DL
O2 SATURATION: 95.8 % (ref 92–98.5)
O2HB: 95 % (ref 94–97)
OPERATOR ID: 377
OVALOCYTES: ABNORMAL
PATIENT TEMP: 37 C
PCO2: 40.5 MMHG (ref 35–45)
PDW BLD-RTO: 19.2 FL (ref 11.5–15)
PEEP/CPAP: 10 CMH2O
PFO2: 1.08 MMHG/%
PH BLOOD GAS: 7.34 (ref 7.35–7.45)
PH UA: 7 (ref 5–9)
PLATELET # BLD: 148 E9/L (ref 130–450)
PMV BLD AUTO: 11.6 FL (ref 7–12)
PO2: 86.5 MMHG (ref 60–100)
POIKILOCYTES: ABNORMAL
POLYCHROMASIA: ABNORMAL
POTASSIUM SERPL-SCNC: 4 MMOL/L (ref 3.5–5)
POTASSIUM SERPL-SCNC: 4 MMOL/L (ref 3.5–5)
PROTEIN PROTEIN: 237 MG/DL (ref 0–12)
PROTEIN UA: >=300 MG/DL
PROTEIN/CREAT RATIO: 5.2
PROTEIN/CREAT RATIO: 5.2 (ref 0–0.2)
PS: 16 CMH20
RBC # BLD: 2.62 E12/L (ref 3.5–5.5)
RBC UA: ABNORMAL /HPF (ref 0–2)
RI(T): 4.87
SEDIMENTATION RATE, ERYTHROCYTE: 135 MM/HR (ref 0–20)
SODIUM BLD-SCNC: 139 MMOL/L (ref 132–146)
SODIUM BLD-SCNC: 139 MMOL/L (ref 132–146)
SOURCE, BLOOD GAS: ABNORMAL
SPECIFIC GRAVITY UA: 1.02 (ref 1–1.03)
THB: 8.8 G/DL (ref 11.5–16.5)
TIME ANALYZED: 502
TOTAL PROTEIN: 5.7 G/DL (ref 6.4–8.3)
UREA NITROGEN, UR: 365 MG/DL (ref 800–1666)
UROBILINOGEN, URINE: 0.2 E.U./DL
VANCOMYCIN RANDOM: 22.1 MCG/ML (ref 5–40)
WBC # BLD: 7.6 E9/L (ref 4.5–11.5)
WBC UA: ABNORMAL /HPF (ref 0–5)

## 2019-11-01 PROCEDURE — 2000000000 HC ICU R&B

## 2019-11-01 PROCEDURE — 6370000000 HC RX 637 (ALT 250 FOR IP): Performed by: INTERNAL MEDICINE

## 2019-11-01 PROCEDURE — 84156 ASSAY OF PROTEIN URINE: CPT

## 2019-11-01 PROCEDURE — C1729 CATH, DRAINAGE: HCPCS

## 2019-11-01 PROCEDURE — 82805 BLOOD GASES W/O2 SATURATION: CPT

## 2019-11-01 PROCEDURE — 2580000003 HC RX 258: Performed by: INTERNAL MEDICINE

## 2019-11-01 PROCEDURE — 94660 CPAP INITIATION&MGMT: CPT

## 2019-11-01 PROCEDURE — 71045 X-RAY EXAM CHEST 1 VIEW: CPT

## 2019-11-01 PROCEDURE — 87205 SMEAR GRAM STAIN: CPT

## 2019-11-01 PROCEDURE — 2580000003 HC RX 258: Performed by: NURSE PRACTITIONER

## 2019-11-01 PROCEDURE — 6360000002 HC RX W HCPCS: Performed by: INTERNAL MEDICINE

## 2019-11-01 PROCEDURE — 36415 COLL VENOUS BLD VENIPUNCTURE: CPT

## 2019-11-01 PROCEDURE — 85025 COMPLETE CBC W/AUTO DIFF WBC: CPT

## 2019-11-01 PROCEDURE — 36591 DRAW BLOOD OFF VENOUS DEVICE: CPT

## 2019-11-01 PROCEDURE — 86140 C-REACTIVE PROTEIN: CPT

## 2019-11-01 PROCEDURE — 2700000000 HC OXYGEN THERAPY PER DAY

## 2019-11-01 PROCEDURE — 80048 BASIC METABOLIC PNL TOTAL CA: CPT

## 2019-11-01 PROCEDURE — 87070 CULTURE OTHR SPECIMN AEROBIC: CPT

## 2019-11-01 PROCEDURE — 85651 RBC SED RATE NONAUTOMATED: CPT

## 2019-11-01 PROCEDURE — 2500000003 HC RX 250 WO HCPCS: Performed by: INTERNAL MEDICINE

## 2019-11-01 PROCEDURE — 77012 CT SCAN FOR NEEDLE BIOPSY: CPT

## 2019-11-01 PROCEDURE — 80053 COMPREHEN METABOLIC PANEL: CPT

## 2019-11-01 PROCEDURE — 99233 SBSQ HOSP IP/OBS HIGH 50: CPT | Performed by: INTERNAL MEDICINE

## 2019-11-01 PROCEDURE — 82962 GLUCOSE BLOOD TEST: CPT

## 2019-11-01 PROCEDURE — C9113 INJ PANTOPRAZOLE SODIUM, VIA: HCPCS | Performed by: INTERNAL MEDICINE

## 2019-11-01 PROCEDURE — 80202 ASSAY OF VANCOMYCIN: CPT

## 2019-11-01 PROCEDURE — 6370000000 HC RX 637 (ALT 250 FOR IP): Performed by: NURSE PRACTITIONER

## 2019-11-01 PROCEDURE — 81001 URINALYSIS AUTO W/SCOPE: CPT

## 2019-11-01 PROCEDURE — 82570 ASSAY OF URINE CREATININE: CPT

## 2019-11-01 PROCEDURE — 84540 ASSAY OF URINE/UREA-N: CPT

## 2019-11-01 PROCEDURE — 47490 INCISION OF GALLBLADDER: CPT | Performed by: RADIOLOGY

## 2019-11-01 RX ORDER — SODIUM CHLORIDE 9 MG/ML
10 INJECTION INTRAVENOUS DAILY
Status: DISCONTINUED | OUTPATIENT
Start: 2019-11-01 | End: 2019-11-03

## 2019-11-01 RX ORDER — ISOSORBIDE MONONITRATE 30 MG/1
30 TABLET, EXTENDED RELEASE ORAL DAILY
Status: DISCONTINUED | OUTPATIENT
Start: 2019-11-01 | End: 2019-11-09 | Stop reason: HOSPADM

## 2019-11-01 RX ORDER — HYDROMORPHONE HCL 110MG/55ML
0.4 PATIENT CONTROLLED ANALGESIA SYRINGE INTRAVENOUS EVERY 4 HOURS PRN
Status: DISCONTINUED | OUTPATIENT
Start: 2019-11-01 | End: 2019-11-03

## 2019-11-01 RX ORDER — FUROSEMIDE 10 MG/ML
60 INJECTION INTRAMUSCULAR; INTRAVENOUS 3 TIMES DAILY
Status: DISCONTINUED | OUTPATIENT
Start: 2019-11-01 | End: 2019-11-03

## 2019-11-01 RX ORDER — PANTOPRAZOLE SODIUM 40 MG/10ML
40 INJECTION, POWDER, LYOPHILIZED, FOR SOLUTION INTRAVENOUS DAILY
Status: DISCONTINUED | OUTPATIENT
Start: 2019-11-01 | End: 2019-11-03

## 2019-11-01 RX ORDER — HYDROMORPHONE HCL 110MG/55ML
PATIENT CONTROLLED ANALGESIA SYRINGE INTRAVENOUS
Status: DISPENSED
Start: 2019-11-01 | End: 2019-11-02

## 2019-11-01 RX ADMIN — Medication 10 ML: at 20:39

## 2019-11-01 RX ADMIN — METOPROLOL TARTRATE 5 MG: 5 INJECTION, SOLUTION INTRAVENOUS at 04:25

## 2019-11-01 RX ADMIN — HYDROMORPHONE HYDROCHLORIDE 0.4 MG: 2 INJECTION INTRAMUSCULAR; INTRAVENOUS; SUBCUTANEOUS at 10:34

## 2019-11-01 RX ADMIN — METOPROLOL TARTRATE 5 MG: 5 INJECTION, SOLUTION INTRAVENOUS at 18:03

## 2019-11-01 RX ADMIN — ISOSORBIDE MONONITRATE 30 MG: 30 TABLET, EXTENDED RELEASE ORAL at 10:17

## 2019-11-01 RX ADMIN — Medication 10 ML: at 10:21

## 2019-11-01 RX ADMIN — HYDROMORPHONE HYDROCHLORIDE 0.4 MG: 2 INJECTION INTRAMUSCULAR; INTRAVENOUS; SUBCUTANEOUS at 05:23

## 2019-11-01 RX ADMIN — HYDROMORPHONE HYDROCHLORIDE 0.2 MG: 2 INJECTION INTRAMUSCULAR; INTRAVENOUS; SUBCUTANEOUS at 14:36

## 2019-11-01 RX ADMIN — INSULIN LISPRO 4 UNITS: 100 INJECTION, SOLUTION INTRAVENOUS; SUBCUTANEOUS at 16:52

## 2019-11-01 RX ADMIN — FAMOTIDINE 20 MG: 20 TABLET ORAL at 10:17

## 2019-11-01 RX ADMIN — HYDROMORPHONE HYDROCHLORIDE 0.2 MG: 2 INJECTION INTRAMUSCULAR; INTRAVENOUS; SUBCUTANEOUS at 18:45

## 2019-11-01 RX ADMIN — FUROSEMIDE 60 MG: 10 INJECTION, SOLUTION INTRAMUSCULAR; INTRAVENOUS at 10:21

## 2019-11-01 RX ADMIN — METOPROLOL TARTRATE 5 MG: 5 INJECTION, SOLUTION INTRAVENOUS at 23:12

## 2019-11-01 RX ADMIN — PIPERACILLIN SODIUM AND TAZOBACTAM SODIUM 3.38 G: 3; .375 INJECTION, POWDER, LYOPHILIZED, FOR SOLUTION INTRAVENOUS at 10:20

## 2019-11-01 RX ADMIN — FUROSEMIDE 60 MG: 10 INJECTION, SOLUTION INTRAMUSCULAR; INTRAVENOUS at 22:00

## 2019-11-01 RX ADMIN — METOPROLOL TARTRATE 5 MG: 5 INJECTION, SOLUTION INTRAVENOUS at 11:33

## 2019-11-01 RX ADMIN — HYDROMORPHONE HYDROCHLORIDE 0.4 MG: 2 INJECTION INTRAMUSCULAR; INTRAVENOUS; SUBCUTANEOUS at 01:17

## 2019-11-01 RX ADMIN — PANTOPRAZOLE SODIUM 40 MG: 40 INJECTION, POWDER, FOR SOLUTION INTRAVENOUS at 14:28

## 2019-11-01 RX ADMIN — INSULIN LISPRO 2 UNITS: 100 INJECTION, SOLUTION INTRAVENOUS; SUBCUTANEOUS at 12:16

## 2019-11-01 RX ADMIN — PIPERACILLIN SODIUM AND TAZOBACTAM SODIUM 3.38 G: 3; .375 INJECTION, POWDER, LYOPHILIZED, FOR SOLUTION INTRAVENOUS at 21:44

## 2019-11-01 RX ADMIN — INSULIN LISPRO 2 UNITS: 100 INJECTION, SOLUTION INTRAVENOUS; SUBCUTANEOUS at 20:48

## 2019-11-01 RX ADMIN — FUROSEMIDE 60 MG: 10 INJECTION, SOLUTION INTRAMUSCULAR; INTRAVENOUS at 16:53

## 2019-11-01 RX ADMIN — HYDROMORPHONE HYDROCHLORIDE 0.4 MG: 2 INJECTION INTRAMUSCULAR; INTRAVENOUS; SUBCUTANEOUS at 23:07

## 2019-11-01 RX ADMIN — Medication 10 ML: at 14:42

## 2019-11-01 ASSESSMENT — PAIN DESCRIPTION - PAIN TYPE
TYPE: ACUTE PAIN

## 2019-11-01 ASSESSMENT — PAIN DESCRIPTION - LOCATION
LOCATION: HEAD
LOCATION: HEAD;BACK;ABDOMEN

## 2019-11-01 ASSESSMENT — PAIN SCALES - GENERAL
PAINLEVEL_OUTOF10: 10
PAINLEVEL_OUTOF10: 6
PAINLEVEL_OUTOF10: 10
PAINLEVEL_OUTOF10: 10
PAINLEVEL_OUTOF10: 6
PAINLEVEL_OUTOF10: 6
PAINLEVEL_OUTOF10: 0

## 2019-11-01 ASSESSMENT — PAIN DESCRIPTION - DESCRIPTORS
DESCRIPTORS: ACHING;DISCOMFORT
DESCRIPTORS: ACHING;DISCOMFORT

## 2019-11-02 ENCOUNTER — APPOINTMENT (OUTPATIENT)
Dept: CT IMAGING | Age: 29
DRG: 710 | End: 2019-11-02
Payer: COMMERCIAL

## 2019-11-02 ENCOUNTER — APPOINTMENT (OUTPATIENT)
Dept: GENERAL RADIOLOGY | Age: 29
DRG: 710 | End: 2019-11-02
Payer: COMMERCIAL

## 2019-11-02 LAB
ALBUMIN SERPL-MCNC: 2.1 G/DL (ref 3.5–5.2)
ALBUMIN SERPL-MCNC: 2.5 G/DL (ref 3.5–5.2)
ALP BLD-CCNC: 174 U/L (ref 35–104)
ALP BLD-CCNC: 180 U/L (ref 35–104)
ALT SERPL-CCNC: 23 U/L (ref 0–32)
ALT SERPL-CCNC: 25 U/L (ref 0–32)
ANION GAP SERPL CALCULATED.3IONS-SCNC: 19 MMOL/L (ref 7–16)
ANION GAP SERPL CALCULATED.3IONS-SCNC: 21 MMOL/L (ref 7–16)
AST SERPL-CCNC: 14 U/L (ref 0–31)
AST SERPL-CCNC: 9 U/L (ref 0–31)
BASOPHILS ABSOLUTE: 0.03 E9/L (ref 0–0.2)
BASOPHILS RELATIVE PERCENT: 0.4 % (ref 0–2)
BILIRUB SERPL-MCNC: 0.4 MG/DL (ref 0–1.2)
BILIRUB SERPL-MCNC: 0.4 MG/DL (ref 0–1.2)
BUN BLDV-MCNC: 65 MG/DL (ref 6–20)
BUN BLDV-MCNC: 65 MG/DL (ref 6–20)
CALCIUM SERPL-MCNC: 8.2 MG/DL (ref 8.6–10.2)
CALCIUM SERPL-MCNC: 8.4 MG/DL (ref 8.6–10.2)
CHLORIDE BLD-SCNC: 100 MMOL/L (ref 98–107)
CHLORIDE BLD-SCNC: 97 MMOL/L (ref 98–107)
CO2: 19 MMOL/L (ref 22–29)
CO2: 20 MMOL/L (ref 22–29)
CREAT SERPL-MCNC: 3.6 MG/DL (ref 0.5–1)
CREAT SERPL-MCNC: 3.7 MG/DL (ref 0.5–1)
EOSINOPHILS ABSOLUTE: 0.67 E9/L (ref 0.05–0.5)
EOSINOPHILS RELATIVE PERCENT: 9.7 % (ref 0–6)
GFR AFRICAN AMERICAN: 18
GFR AFRICAN AMERICAN: 18
GFR NON-AFRICAN AMERICAN: 18 ML/MIN/1.73
GFR NON-AFRICAN AMERICAN: 18 ML/MIN/1.73
GLUCOSE BLD-MCNC: 172 MG/DL (ref 74–99)
GLUCOSE BLD-MCNC: 281 MG/DL (ref 74–99)
GRAM STAIN ORDERABLE: NORMAL
HCT VFR BLD CALC: 22.6 % (ref 34–48)
HCT VFR BLD CALC: 25.2 % (ref 34–48)
HEMOGLOBIN: 7.3 G/DL (ref 11.5–15.5)
HEMOGLOBIN: 8 G/DL (ref 11.5–15.5)
IMMATURE GRANULOCYTES #: 0.04 E9/L
IMMATURE GRANULOCYTES %: 0.6 % (ref 0–5)
LACTIC ACID: 0.7 MMOL/L (ref 0.5–2.2)
LYMPHOCYTES ABSOLUTE: 0.84 E9/L (ref 1.5–4)
LYMPHOCYTES RELATIVE PERCENT: 12.1 % (ref 20–42)
MCH RBC QN AUTO: 29.6 PG (ref 26–35)
MCH RBC QN AUTO: 30.3 PG (ref 26–35)
MCHC RBC AUTO-ENTMCNC: 31.7 % (ref 32–34.5)
MCHC RBC AUTO-ENTMCNC: 32.3 % (ref 32–34.5)
MCV RBC AUTO: 93.3 FL (ref 80–99.9)
MCV RBC AUTO: 93.8 FL (ref 80–99.9)
METER GLUCOSE: 179 MG/DL (ref 74–99)
METER GLUCOSE: 185 MG/DL (ref 74–99)
METER GLUCOSE: 197 MG/DL (ref 74–99)
METER GLUCOSE: 202 MG/DL (ref 74–99)
MONOCYTES ABSOLUTE: 0.35 E9/L (ref 0.1–0.95)
MONOCYTES RELATIVE PERCENT: 5.1 % (ref 2–12)
NEUTROPHILS ABSOLUTE: 5 E9/L (ref 1.8–7.3)
NEUTROPHILS RELATIVE PERCENT: 72.1 % (ref 43–80)
PDW BLD-RTO: 18.6 FL (ref 11.5–15)
PDW BLD-RTO: 18.6 FL (ref 11.5–15)
PLATELET # BLD: 257 E9/L (ref 130–450)
PLATELET # BLD: 313 E9/L (ref 130–450)
PMV BLD AUTO: 10.6 FL (ref 7–12)
PMV BLD AUTO: 9.6 FL (ref 7–12)
POTASSIUM SERPL-SCNC: 3.6 MMOL/L (ref 3.5–5)
POTASSIUM SERPL-SCNC: 3.8 MMOL/L (ref 3.5–5)
RBC # BLD: 2.41 E12/L (ref 3.5–5.5)
RBC # BLD: 2.7 E12/L (ref 3.5–5.5)
SODIUM BLD-SCNC: 138 MMOL/L (ref 132–146)
SODIUM BLD-SCNC: 138 MMOL/L (ref 132–146)
TOTAL PROTEIN: 5.6 G/DL (ref 6.4–8.3)
TOTAL PROTEIN: 5.9 G/DL (ref 6.4–8.3)
WBC # BLD: 6.3 E9/L (ref 4.5–11.5)
WBC # BLD: 6.9 E9/L (ref 4.5–11.5)

## 2019-11-02 PROCEDURE — 2000000000 HC ICU R&B

## 2019-11-02 PROCEDURE — 6360000002 HC RX W HCPCS: Performed by: INTERNAL MEDICINE

## 2019-11-02 PROCEDURE — 36415 COLL VENOUS BLD VENIPUNCTURE: CPT

## 2019-11-02 PROCEDURE — 2580000003 HC RX 258: Performed by: NURSE PRACTITIONER

## 2019-11-02 PROCEDURE — 6370000000 HC RX 637 (ALT 250 FOR IP): Performed by: INTERNAL MEDICINE

## 2019-11-02 PROCEDURE — 2500000003 HC RX 250 WO HCPCS: Performed by: INTERNAL MEDICINE

## 2019-11-02 PROCEDURE — 83605 ASSAY OF LACTIC ACID: CPT

## 2019-11-02 PROCEDURE — 87324 CLOSTRIDIUM AG IA: CPT

## 2019-11-02 PROCEDURE — 36591 DRAW BLOOD OFF VENOUS DEVICE: CPT

## 2019-11-02 PROCEDURE — 74176 CT ABD & PELVIS W/O CONTRAST: CPT

## 2019-11-02 PROCEDURE — 2700000000 HC OXYGEN THERAPY PER DAY

## 2019-11-02 PROCEDURE — 82962 GLUCOSE BLOOD TEST: CPT

## 2019-11-02 PROCEDURE — 6360000004 HC RX CONTRAST MEDICATION: Performed by: RADIOLOGY

## 2019-11-02 PROCEDURE — 80053 COMPREHEN METABOLIC PANEL: CPT

## 2019-11-02 PROCEDURE — C9113 INJ PANTOPRAZOLE SODIUM, VIA: HCPCS | Performed by: INTERNAL MEDICINE

## 2019-11-02 PROCEDURE — 85025 COMPLETE CBC W/AUTO DIFF WBC: CPT

## 2019-11-02 PROCEDURE — 99233 SBSQ HOSP IP/OBS HIGH 50: CPT | Performed by: INTERNAL MEDICINE

## 2019-11-02 PROCEDURE — 87449 NOS EACH ORGANISM AG IA: CPT

## 2019-11-02 PROCEDURE — 71045 X-RAY EXAM CHEST 1 VIEW: CPT

## 2019-11-02 PROCEDURE — 94660 CPAP INITIATION&MGMT: CPT

## 2019-11-02 PROCEDURE — 85027 COMPLETE CBC AUTOMATED: CPT

## 2019-11-02 PROCEDURE — 2580000003 HC RX 258: Performed by: INTERNAL MEDICINE

## 2019-11-02 RX ORDER — AMOXICILLIN AND CLAVULANATE POTASSIUM 500; 125 MG/1; MG/1
1 TABLET, FILM COATED ORAL EVERY 12 HOURS SCHEDULED
Status: DISCONTINUED | OUTPATIENT
Start: 2019-11-02 | End: 2019-11-09 | Stop reason: HOSPADM

## 2019-11-02 RX ORDER — HYDROMORPHONE HCL 110MG/55ML
0.2 PATIENT CONTROLLED ANALGESIA SYRINGE INTRAVENOUS ONCE
Status: COMPLETED | OUTPATIENT
Start: 2019-11-02 | End: 2019-11-02

## 2019-11-02 RX ADMIN — INSULIN LISPRO 2 UNITS: 100 INJECTION, SOLUTION INTRAVENOUS; SUBCUTANEOUS at 03:27

## 2019-11-02 RX ADMIN — HYDROMORPHONE HYDROCHLORIDE 0.4 MG: 2 INJECTION INTRAMUSCULAR; INTRAVENOUS; SUBCUTANEOUS at 20:24

## 2019-11-02 RX ADMIN — HYDROMORPHONE HYDROCHLORIDE 0.4 MG: 2 INJECTION INTRAMUSCULAR; INTRAVENOUS; SUBCUTANEOUS at 11:28

## 2019-11-02 RX ADMIN — Medication 10 ML: at 12:07

## 2019-11-02 RX ADMIN — INSULIN LISPRO 2 UNITS: 100 INJECTION, SOLUTION INTRAVENOUS; SUBCUTANEOUS at 09:03

## 2019-11-02 RX ADMIN — METOPROLOL TARTRATE 5 MG: 5 INJECTION, SOLUTION INTRAVENOUS at 20:23

## 2019-11-02 RX ADMIN — AMOXICILLIN AND CLAVULANATE POTASSIUM 1 TABLET: 500; 125 TABLET, FILM COATED ORAL at 23:00

## 2019-11-02 RX ADMIN — Medication 10 ML: at 15:09

## 2019-11-02 RX ADMIN — INSULIN LISPRO 6 UNITS: 100 INJECTION, SOLUTION INTRAVENOUS; SUBCUTANEOUS at 22:13

## 2019-11-02 RX ADMIN — HYDROMORPHONE HYDROCHLORIDE 0.4 MG: 2 INJECTION INTRAMUSCULAR; INTRAVENOUS; SUBCUTANEOUS at 03:17

## 2019-11-02 RX ADMIN — IOHEXOL 50 ML: 240 INJECTION, SOLUTION INTRATHECAL; INTRAVASCULAR; INTRAVENOUS; ORAL at 23:45

## 2019-11-02 RX ADMIN — HYDROMORPHONE HYDROCHLORIDE 0.4 MG: 2 INJECTION INTRAMUSCULAR; INTRAVENOUS; SUBCUTANEOUS at 07:17

## 2019-11-02 RX ADMIN — PANTOPRAZOLE SODIUM 40 MG: 40 INJECTION, POWDER, FOR SOLUTION INTRAVENOUS at 10:36

## 2019-11-02 RX ADMIN — INSULIN LISPRO 4 UNITS: 100 INJECTION, SOLUTION INTRAVENOUS; SUBCUTANEOUS at 11:55

## 2019-11-02 RX ADMIN — Medication 10 ML: at 09:00

## 2019-11-02 RX ADMIN — METOPROLOL TARTRATE 5 MG: 5 INJECTION, SOLUTION INTRAVENOUS at 12:07

## 2019-11-02 RX ADMIN — HYDROMORPHONE HYDROCHLORIDE 0.4 MG: 2 INJECTION INTRAMUSCULAR; INTRAVENOUS; SUBCUTANEOUS at 16:15

## 2019-11-02 RX ADMIN — INSULIN LISPRO 2 UNITS: 100 INJECTION, SOLUTION INTRAVENOUS; SUBCUTANEOUS at 16:14

## 2019-11-02 RX ADMIN — FUROSEMIDE 60 MG: 10 INJECTION, SOLUTION INTRAMUSCULAR; INTRAVENOUS at 15:09

## 2019-11-02 RX ADMIN — METOPROLOL TARTRATE 5 MG: 5 INJECTION, SOLUTION INTRAVENOUS at 06:03

## 2019-11-02 RX ADMIN — FUROSEMIDE 60 MG: 10 INJECTION, SOLUTION INTRAMUSCULAR; INTRAVENOUS at 10:36

## 2019-11-02 RX ADMIN — Medication 10 ML: at 22:12

## 2019-11-02 RX ADMIN — ISOSORBIDE MONONITRATE 30 MG: 30 TABLET, EXTENDED RELEASE ORAL at 10:38

## 2019-11-02 RX ADMIN — HYDROMORPHONE HYDROCHLORIDE 0.2 MG: 2 INJECTION, SOLUTION INTRAMUSCULAR; INTRAVENOUS; SUBCUTANEOUS at 22:18

## 2019-11-02 RX ADMIN — AMOXICILLIN AND CLAVULANATE POTASSIUM 1 TABLET: 500; 125 TABLET, FILM COATED ORAL at 10:37

## 2019-11-02 RX ADMIN — Medication 10 ML: at 16:14

## 2019-11-02 RX ADMIN — FUROSEMIDE 60 MG: 10 INJECTION, SOLUTION INTRAMUSCULAR; INTRAVENOUS at 22:13

## 2019-11-02 RX ADMIN — Medication 10 ML: at 10:37

## 2019-11-02 ASSESSMENT — PAIN SCALES - GENERAL
PAINLEVEL_OUTOF10: 9
PAINLEVEL_OUTOF10: 5
PAINLEVEL_OUTOF10: 9
PAINLEVEL_OUTOF10: 10
PAINLEVEL_OUTOF10: 9
PAINLEVEL_OUTOF10: 10
PAINLEVEL_OUTOF10: 8
PAINLEVEL_OUTOF10: 9
PAINLEVEL_OUTOF10: 8
PAINLEVEL_OUTOF10: 9
PAINLEVEL_OUTOF10: 6
PAINLEVEL_OUTOF10: 7
PAINLEVEL_OUTOF10: 8
PAINLEVEL_OUTOF10: 9
PAINLEVEL_OUTOF10: 6
PAINLEVEL_OUTOF10: 6

## 2019-11-02 ASSESSMENT — PAIN DESCRIPTION - FREQUENCY
FREQUENCY: CONTINUOUS
FREQUENCY: CONTINUOUS

## 2019-11-02 ASSESSMENT — PAIN DESCRIPTION - PAIN TYPE
TYPE: ACUTE PAIN

## 2019-11-02 ASSESSMENT — PAIN DESCRIPTION - DESCRIPTORS
DESCRIPTORS: ACHING;DISCOMFORT
DESCRIPTORS: CONSTANT;ACHING
DESCRIPTORS: ACHING;DISCOMFORT
DESCRIPTORS: ACHING;CONSTANT;DISCOMFORT;SORE;SHARP

## 2019-11-02 ASSESSMENT — PAIN DESCRIPTION - LOCATION
LOCATION: ABDOMEN
LOCATION: ABDOMEN
LOCATION: HEAD;BACK;ABDOMEN
LOCATION: ABDOMEN;BACK
LOCATION: ABDOMEN
LOCATION: ABDOMEN;BACK
LOCATION: ABDOMEN
LOCATION: ABDOMEN
LOCATION: HEAD;BACK;ABDOMEN

## 2019-11-02 ASSESSMENT — PAIN DESCRIPTION - ORIENTATION
ORIENTATION: LOWER

## 2019-11-02 ASSESSMENT — PAIN DESCRIPTION - PROGRESSION
CLINICAL_PROGRESSION: NOT CHANGED
CLINICAL_PROGRESSION: GRADUALLY WORSENING

## 2019-11-02 ASSESSMENT — PAIN DESCRIPTION - ONSET
ONSET: ON-GOING
ONSET: ON-GOING

## 2019-11-02 ASSESSMENT — PAIN - FUNCTIONAL ASSESSMENT: PAIN_FUNCTIONAL_ASSESSMENT: PREVENTS OR INTERFERES SOME ACTIVE ACTIVITIES AND ADLS

## 2019-11-03 LAB
ALBUMIN SERPL-MCNC: 2.5 G/DL (ref 3.5–5.2)
ALP BLD-CCNC: 178 U/L (ref 35–104)
ALT SERPL-CCNC: 20 U/L (ref 0–32)
ANION GAP SERPL CALCULATED.3IONS-SCNC: 17 MMOL/L (ref 7–16)
AST SERPL-CCNC: 12 U/L (ref 0–31)
BASOPHILS ABSOLUTE: 0.03 E9/L (ref 0–0.2)
BASOPHILS RELATIVE PERCENT: 0.6 % (ref 0–2)
BILIRUB SERPL-MCNC: 0.4 MG/DL (ref 0–1.2)
BODY FLUID CULTURE, STERILE: NORMAL
BUN BLDV-MCNC: 65 MG/DL (ref 6–20)
C DIFF TOXIN/ANTIGEN: NORMAL
CALCIUM SERPL-MCNC: 8.5 MG/DL (ref 8.6–10.2)
CHLORIDE BLD-SCNC: 100 MMOL/L (ref 98–107)
CO2: 24 MMOL/L (ref 22–29)
CREAT SERPL-MCNC: 3.7 MG/DL (ref 0.5–1)
EOSINOPHILS ABSOLUTE: 0.75 E9/L (ref 0.05–0.5)
EOSINOPHILS RELATIVE PERCENT: 14.3 % (ref 0–6)
GFR AFRICAN AMERICAN: 18
GFR NON-AFRICAN AMERICAN: 18 ML/MIN/1.73
GLUCOSE BLD-MCNC: 100 MG/DL (ref 74–99)
GRAM STAIN RESULT: NORMAL
HCT VFR BLD CALC: 25 % (ref 34–48)
HEMOGLOBIN: 7.9 G/DL (ref 11.5–15.5)
IMMATURE GRANULOCYTES #: 0.01 E9/L
IMMATURE GRANULOCYTES %: 0.2 % (ref 0–5)
LYMPHOCYTES ABSOLUTE: 0.73 E9/L (ref 1.5–4)
LYMPHOCYTES RELATIVE PERCENT: 14 % (ref 20–42)
MCH RBC QN AUTO: 29.2 PG (ref 26–35)
MCHC RBC AUTO-ENTMCNC: 31.6 % (ref 32–34.5)
MCV RBC AUTO: 92.3 FL (ref 80–99.9)
METER GLUCOSE: 178 MG/DL (ref 74–99)
METER GLUCOSE: 259 MG/DL (ref 74–99)
METER GLUCOSE: 262 MG/DL (ref 74–99)
METER GLUCOSE: 280 MG/DL (ref 74–99)
METER GLUCOSE: 342 MG/DL (ref 74–99)
METER GLUCOSE: 75 MG/DL (ref 74–99)
MONOCYTES ABSOLUTE: 0.38 E9/L (ref 0.1–0.95)
MONOCYTES RELATIVE PERCENT: 7.3 % (ref 2–12)
NEUTROPHILS ABSOLUTE: 3.33 E9/L (ref 1.8–7.3)
NEUTROPHILS RELATIVE PERCENT: 63.6 % (ref 43–80)
PDW BLD-RTO: 18.3 FL (ref 11.5–15)
PLATELET # BLD: 335 E9/L (ref 130–450)
PMV BLD AUTO: 9.9 FL (ref 7–12)
POTASSIUM SERPL-SCNC: 3.4 MMOL/L (ref 3.5–5)
RBC # BLD: 2.71 E12/L (ref 3.5–5.5)
SODIUM BLD-SCNC: 141 MMOL/L (ref 132–146)
TOTAL PROTEIN: 6.1 G/DL (ref 6.4–8.3)
WBC # BLD: 5.2 E9/L (ref 4.5–11.5)

## 2019-11-03 PROCEDURE — 1200000000 HC SEMI PRIVATE

## 2019-11-03 PROCEDURE — 2700000000 HC OXYGEN THERAPY PER DAY

## 2019-11-03 PROCEDURE — 36591 DRAW BLOOD OFF VENOUS DEVICE: CPT

## 2019-11-03 PROCEDURE — 85025 COMPLETE CBC W/AUTO DIFF WBC: CPT

## 2019-11-03 PROCEDURE — 36415 COLL VENOUS BLD VENIPUNCTURE: CPT

## 2019-11-03 PROCEDURE — 2580000003 HC RX 258: Performed by: NURSE PRACTITIONER

## 2019-11-03 PROCEDURE — 99233 SBSQ HOSP IP/OBS HIGH 50: CPT | Performed by: INTERNAL MEDICINE

## 2019-11-03 PROCEDURE — 94660 CPAP INITIATION&MGMT: CPT

## 2019-11-03 PROCEDURE — 6370000000 HC RX 637 (ALT 250 FOR IP): Performed by: INTERNAL MEDICINE

## 2019-11-03 PROCEDURE — 2500000003 HC RX 250 WO HCPCS: Performed by: INTERNAL MEDICINE

## 2019-11-03 PROCEDURE — 80053 COMPREHEN METABOLIC PANEL: CPT

## 2019-11-03 PROCEDURE — 37799 UNLISTED PX VASCULAR SURGERY: CPT

## 2019-11-03 PROCEDURE — 6360000002 HC RX W HCPCS: Performed by: INTERNAL MEDICINE

## 2019-11-03 PROCEDURE — 82962 GLUCOSE BLOOD TEST: CPT

## 2019-11-03 RX ORDER — PANTOPRAZOLE SODIUM 40 MG/1
40 TABLET, DELAYED RELEASE ORAL
Status: DISCONTINUED | OUTPATIENT
Start: 2019-11-03 | End: 2019-11-09 | Stop reason: HOSPADM

## 2019-11-03 RX ORDER — HYDROMORPHONE HCL 110MG/55ML
0.4 PATIENT CONTROLLED ANALGESIA SYRINGE INTRAVENOUS
Status: DISCONTINUED | OUTPATIENT
Start: 2019-11-03 | End: 2019-11-04

## 2019-11-03 RX ORDER — AMLODIPINE BESYLATE 5 MG/1
5 TABLET ORAL DAILY
Status: DISCONTINUED | OUTPATIENT
Start: 2019-11-03 | End: 2019-11-09 | Stop reason: HOSPADM

## 2019-11-03 RX ORDER — CLONIDINE HYDROCHLORIDE 0.2 MG/1
0.2 TABLET ORAL 3 TIMES DAILY
Status: DISCONTINUED | OUTPATIENT
Start: 2019-11-03 | End: 2019-11-09 | Stop reason: HOSPADM

## 2019-11-03 RX ORDER — POTASSIUM CHLORIDE 20 MEQ/1
40 TABLET, EXTENDED RELEASE ORAL PRN
Status: DISCONTINUED | OUTPATIENT
Start: 2019-11-03 | End: 2019-11-09 | Stop reason: HOSPADM

## 2019-11-03 RX ORDER — FUROSEMIDE 10 MG/ML
60 INJECTION INTRAMUSCULAR; INTRAVENOUS 2 TIMES DAILY
Status: DISCONTINUED | OUTPATIENT
Start: 2019-11-03 | End: 2019-11-04

## 2019-11-03 RX ORDER — METOPROLOL TARTRATE 5 MG/5ML
5 INJECTION INTRAVENOUS EVERY 6 HOURS PRN
Status: DISCONTINUED | OUTPATIENT
Start: 2019-11-03 | End: 2019-11-09 | Stop reason: HOSPADM

## 2019-11-03 RX ORDER — POTASSIUM CHLORIDE 7.45 MG/ML
10 INJECTION INTRAVENOUS PRN
Status: DISCONTINUED | OUTPATIENT
Start: 2019-11-03 | End: 2019-11-09 | Stop reason: HOSPADM

## 2019-11-03 RX ADMIN — POTASSIUM CHLORIDE 40 MEQ: 1500 TABLET, EXTENDED RELEASE ORAL at 08:47

## 2019-11-03 RX ADMIN — METOPROLOL TARTRATE 5 MG: 5 INJECTION, SOLUTION INTRAVENOUS at 06:52

## 2019-11-03 RX ADMIN — AMLODIPINE BESYLATE 5 MG: 5 TABLET ORAL at 11:27

## 2019-11-03 RX ADMIN — Medication 10 ML: at 00:49

## 2019-11-03 RX ADMIN — INSULIN LISPRO 6 UNITS: 100 INJECTION, SOLUTION INTRAVENOUS; SUBCUTANEOUS at 00:52

## 2019-11-03 RX ADMIN — FUROSEMIDE 60 MG: 10 INJECTION, SOLUTION INTRAMUSCULAR; INTRAVENOUS at 08:48

## 2019-11-03 RX ADMIN — METOPROLOL TARTRATE 5 MG: 5 INJECTION, SOLUTION INTRAVENOUS at 00:52

## 2019-11-03 RX ADMIN — Medication 10 ML: at 19:52

## 2019-11-03 RX ADMIN — Medication 125 MG: at 08:27

## 2019-11-03 RX ADMIN — Medication 125 MG: at 01:32

## 2019-11-03 RX ADMIN — HYDROMORPHONE HYDROCHLORIDE 0.4 MG: 2 INJECTION INTRAMUSCULAR; INTRAVENOUS; SUBCUTANEOUS at 15:56

## 2019-11-03 RX ADMIN — HYDROMORPHONE HYDROCHLORIDE 0.4 MG: 2 INJECTION INTRAMUSCULAR; INTRAVENOUS; SUBCUTANEOUS at 11:28

## 2019-11-03 RX ADMIN — INSULIN LISPRO 6 UNITS: 100 INJECTION, SOLUTION INTRAVENOUS; SUBCUTANEOUS at 20:32

## 2019-11-03 RX ADMIN — PANTOPRAZOLE SODIUM 40 MG: 40 TABLET, DELAYED RELEASE ORAL at 08:46

## 2019-11-03 RX ADMIN — CLONIDINE HYDROCHLORIDE 0.2 MG: 0.2 TABLET ORAL at 15:55

## 2019-11-03 RX ADMIN — Medication 10 ML: at 06:53

## 2019-11-03 RX ADMIN — INSULIN LISPRO 6 UNITS: 100 INJECTION, SOLUTION INTRAVENOUS; SUBCUTANEOUS at 12:25

## 2019-11-03 RX ADMIN — Medication 10 ML: at 08:31

## 2019-11-03 RX ADMIN — HYDROMORPHONE HYDROCHLORIDE 0.4 MG: 2 INJECTION INTRAMUSCULAR; INTRAVENOUS; SUBCUTANEOUS at 02:27

## 2019-11-03 RX ADMIN — HYDROMORPHONE HYDROCHLORIDE 0.4 MG: 2 INJECTION INTRAMUSCULAR; INTRAVENOUS; SUBCUTANEOUS at 06:52

## 2019-11-03 RX ADMIN — METOPROLOL TARTRATE 5 MG: 5 INJECTION, SOLUTION INTRAVENOUS at 12:24

## 2019-11-03 RX ADMIN — CLONIDINE HYDROCHLORIDE 0.2 MG: 0.2 TABLET ORAL at 11:27

## 2019-11-03 RX ADMIN — ISOSORBIDE MONONITRATE 30 MG: 30 TABLET, EXTENDED RELEASE ORAL at 08:46

## 2019-11-03 RX ADMIN — INSULIN LISPRO 8 UNITS: 100 INJECTION, SOLUTION INTRAVENOUS; SUBCUTANEOUS at 17:09

## 2019-11-03 RX ADMIN — HYDROMORPHONE HYDROCHLORIDE 0.4 MG: 2 INJECTION INTRAMUSCULAR; INTRAVENOUS; SUBCUTANEOUS at 22:30

## 2019-11-03 RX ADMIN — AMOXICILLIN AND CLAVULANATE POTASSIUM 1 TABLET: 500; 125 TABLET, FILM COATED ORAL at 20:34

## 2019-11-03 RX ADMIN — METOPROLOL TARTRATE 5 MG: 5 INJECTION, SOLUTION INTRAVENOUS at 17:09

## 2019-11-03 RX ADMIN — INSULIN LISPRO 2 UNITS: 100 INJECTION, SOLUTION INTRAVENOUS; SUBCUTANEOUS at 08:48

## 2019-11-03 RX ADMIN — AMOXICILLIN AND CLAVULANATE POTASSIUM 1 TABLET: 500; 125 TABLET, FILM COATED ORAL at 08:32

## 2019-11-03 RX ADMIN — HYDROMORPHONE HYDROCHLORIDE 0.4 MG: 2 INJECTION INTRAMUSCULAR; INTRAVENOUS; SUBCUTANEOUS at 19:10

## 2019-11-03 RX ADMIN — FUROSEMIDE 60 MG: 10 INJECTION, SOLUTION INTRAMUSCULAR; INTRAVENOUS at 17:08

## 2019-11-03 RX ADMIN — CLONIDINE HYDROCHLORIDE 0.2 MG: 0.2 TABLET ORAL at 20:35

## 2019-11-03 ASSESSMENT — PAIN - FUNCTIONAL ASSESSMENT
PAIN_FUNCTIONAL_ASSESSMENT: PREVENTS OR INTERFERES SOME ACTIVE ACTIVITIES AND ADLS

## 2019-11-03 ASSESSMENT — PAIN DESCRIPTION - ONSET
ONSET: ON-GOING

## 2019-11-03 ASSESSMENT — PAIN DESCRIPTION - ORIENTATION
ORIENTATION: LOWER
ORIENTATION: LOWER;RIGHT

## 2019-11-03 ASSESSMENT — PAIN DESCRIPTION - PROGRESSION
CLINICAL_PROGRESSION: NOT CHANGED

## 2019-11-03 ASSESSMENT — PAIN DESCRIPTION - DESCRIPTORS
DESCRIPTORS: ACHING;DISCOMFORT;DULL
DESCRIPTORS: CONSTANT;DISCOMFORT;SORE
DESCRIPTORS: ACHING;DISCOMFORT;DULL
DESCRIPTORS: ACHING;DISCOMFORT;DULL

## 2019-11-03 ASSESSMENT — PAIN SCALES - GENERAL
PAINLEVEL_OUTOF10: 9
PAINLEVEL_OUTOF10: 9
PAINLEVEL_OUTOF10: 4
PAINLEVEL_OUTOF10: 0
PAINLEVEL_OUTOF10: 6
PAINLEVEL_OUTOF10: 8
PAINLEVEL_OUTOF10: 6
PAINLEVEL_OUTOF10: 8
PAINLEVEL_OUTOF10: 9
PAINLEVEL_OUTOF10: 6
PAINLEVEL_OUTOF10: 8

## 2019-11-03 ASSESSMENT — PAIN DESCRIPTION - LOCATION
LOCATION: ABDOMEN

## 2019-11-03 ASSESSMENT — PAIN DESCRIPTION - FREQUENCY
FREQUENCY: CONTINUOUS

## 2019-11-03 ASSESSMENT — PAIN DESCRIPTION - PAIN TYPE
TYPE: ACUTE PAIN

## 2019-11-04 LAB
ALBUMIN SERPL-MCNC: 2.3 G/DL (ref 3.5–5.2)
ALP BLD-CCNC: 159 U/L (ref 35–104)
ALT SERPL-CCNC: 14 U/L (ref 0–32)
ANION GAP SERPL CALCULATED.3IONS-SCNC: 11 MMOL/L (ref 7–16)
AST SERPL-CCNC: 10 U/L (ref 0–31)
BILIRUB SERPL-MCNC: 0.2 MG/DL (ref 0–1.2)
BLOOD CULTURE, ROUTINE: NORMAL
BUN BLDV-MCNC: 52 MG/DL (ref 6–20)
CALCIUM SERPL-MCNC: 8.4 MG/DL (ref 8.6–10.2)
CHLORIDE BLD-SCNC: 105 MMOL/L (ref 98–107)
CO2: 23 MMOL/L (ref 22–29)
CREAT SERPL-MCNC: 3 MG/DL (ref 0.5–1)
CULTURE, BLOOD 2: NORMAL
GFR AFRICAN AMERICAN: 23
GFR NON-AFRICAN AMERICAN: 23 ML/MIN/1.73
GLUCOSE BLD-MCNC: 337 MG/DL (ref 74–99)
HCT VFR BLD CALC: 24.9 % (ref 34–48)
HEMOGLOBIN: 7.9 G/DL (ref 11.5–15.5)
LIPASE: 28 U/L (ref 13–60)
MAGNESIUM: 1.9 MG/DL (ref 1.6–2.6)
MCH RBC QN AUTO: 29.4 PG (ref 26–35)
MCHC RBC AUTO-ENTMCNC: 31.7 % (ref 32–34.5)
MCV RBC AUTO: 92.6 FL (ref 80–99.9)
METER GLUCOSE: 172 MG/DL (ref 74–99)
METER GLUCOSE: 178 MG/DL (ref 74–99)
METER GLUCOSE: 219 MG/DL (ref 74–99)
METER GLUCOSE: 275 MG/DL (ref 74–99)
METER GLUCOSE: 364 MG/DL (ref 74–99)
PDW BLD-RTO: 18.5 FL (ref 11.5–15)
PHOSPHORUS: 3.5 MG/DL (ref 2.5–4.5)
PLATELET # BLD: 415 E9/L (ref 130–450)
PMV BLD AUTO: 9.8 FL (ref 7–12)
POTASSIUM SERPL-SCNC: 3.7 MMOL/L (ref 3.5–5)
RBC # BLD: 2.69 E12/L (ref 3.5–5.5)
SODIUM BLD-SCNC: 139 MMOL/L (ref 132–146)
TOTAL PROTEIN: 6 G/DL (ref 6.4–8.3)
WBC # BLD: 5.1 E9/L (ref 4.5–11.5)

## 2019-11-04 PROCEDURE — 99233 SBSQ HOSP IP/OBS HIGH 50: CPT | Performed by: INTERNAL MEDICINE

## 2019-11-04 PROCEDURE — 1200000000 HC SEMI PRIVATE

## 2019-11-04 PROCEDURE — 6370000000 HC RX 637 (ALT 250 FOR IP): Performed by: INTERNAL MEDICINE

## 2019-11-04 PROCEDURE — 6370000000 HC RX 637 (ALT 250 FOR IP): Performed by: NURSE PRACTITIONER

## 2019-11-04 PROCEDURE — 6360000002 HC RX W HCPCS: Performed by: INTERNAL MEDICINE

## 2019-11-04 PROCEDURE — 36591 DRAW BLOOD OFF VENOUS DEVICE: CPT

## 2019-11-04 PROCEDURE — 2500000003 HC RX 250 WO HCPCS: Performed by: INTERNAL MEDICINE

## 2019-11-04 PROCEDURE — 80053 COMPREHEN METABOLIC PANEL: CPT

## 2019-11-04 PROCEDURE — 82962 GLUCOSE BLOOD TEST: CPT

## 2019-11-04 PROCEDURE — 83690 ASSAY OF LIPASE: CPT

## 2019-11-04 PROCEDURE — 83735 ASSAY OF MAGNESIUM: CPT

## 2019-11-04 PROCEDURE — 2700000000 HC OXYGEN THERAPY PER DAY

## 2019-11-04 PROCEDURE — 36415 COLL VENOUS BLD VENIPUNCTURE: CPT

## 2019-11-04 PROCEDURE — 2580000003 HC RX 258: Performed by: NURSE PRACTITIONER

## 2019-11-04 PROCEDURE — 6360000002 HC RX W HCPCS

## 2019-11-04 PROCEDURE — APPSS30 APP SPLIT SHARED TIME 16-30 MINUTES: Performed by: NURSE PRACTITIONER

## 2019-11-04 PROCEDURE — 85027 COMPLETE CBC AUTOMATED: CPT

## 2019-11-04 PROCEDURE — 84100 ASSAY OF PHOSPHORUS: CPT

## 2019-11-04 RX ORDER — HEPARIN SODIUM (PORCINE) LOCK FLUSH IV SOLN 100 UNIT/ML 100 UNIT/ML
100 SOLUTION INTRAVENOUS PRN
Status: DISCONTINUED | OUTPATIENT
Start: 2019-11-04 | End: 2019-11-09 | Stop reason: HOSPADM

## 2019-11-04 RX ORDER — FUROSEMIDE 10 MG/ML
40 INJECTION INTRAMUSCULAR; INTRAVENOUS 2 TIMES DAILY
Status: DISCONTINUED | OUTPATIENT
Start: 2019-11-04 | End: 2019-11-09 | Stop reason: HOSPADM

## 2019-11-04 RX ORDER — DEXTROSE MONOHYDRATE 50 MG/ML
100 INJECTION, SOLUTION INTRAVENOUS PRN
Status: DISCONTINUED | OUTPATIENT
Start: 2019-11-04 | End: 2019-11-09 | Stop reason: HOSPADM

## 2019-11-04 RX ORDER — NICOTINE POLACRILEX 4 MG
15 LOZENGE BUCCAL PRN
Status: DISCONTINUED | OUTPATIENT
Start: 2019-11-04 | End: 2019-11-09 | Stop reason: HOSPADM

## 2019-11-04 RX ORDER — OXYCODONE HYDROCHLORIDE AND ACETAMINOPHEN 5; 325 MG/1; MG/1
1 TABLET ORAL EVERY 4 HOURS PRN
Status: DISCONTINUED | OUTPATIENT
Start: 2019-11-04 | End: 2019-11-07

## 2019-11-04 RX ORDER — HEPARIN SODIUM (PORCINE) LOCK FLUSH IV SOLN 100 UNIT/ML 100 UNIT/ML
SOLUTION INTRAVENOUS
Status: COMPLETED
Start: 2019-11-04 | End: 2019-11-04

## 2019-11-04 RX ORDER — HYDROMORPHONE HCL 110MG/55ML
1 PATIENT CONTROLLED ANALGESIA SYRINGE INTRAVENOUS EVERY 4 HOURS PRN
Status: COMPLETED | OUTPATIENT
Start: 2019-11-04 | End: 2019-11-05

## 2019-11-04 RX ORDER — DEXTROSE MONOHYDRATE 25 G/50ML
12.5 INJECTION, SOLUTION INTRAVENOUS PRN
Status: DISCONTINUED | OUTPATIENT
Start: 2019-11-04 | End: 2019-11-09 | Stop reason: HOSPADM

## 2019-11-04 RX ORDER — DIPHENOXYLATE HYDROCHLORIDE AND ATROPINE SULFATE 2.5; .025 MG/1; MG/1
2 TABLET ORAL 4 TIMES DAILY PRN
Status: DISCONTINUED | OUTPATIENT
Start: 2019-11-04 | End: 2019-11-09 | Stop reason: HOSPADM

## 2019-11-04 RX ADMIN — PANTOPRAZOLE SODIUM 40 MG: 40 TABLET, DELAYED RELEASE ORAL at 05:32

## 2019-11-04 RX ADMIN — CLONIDINE HYDROCHLORIDE 0.2 MG: 0.2 TABLET ORAL at 20:33

## 2019-11-04 RX ADMIN — CLONIDINE HYDROCHLORIDE 0.2 MG: 0.2 TABLET ORAL at 14:47

## 2019-11-04 RX ADMIN — METOPROLOL TARTRATE 5 MG: 5 INJECTION, SOLUTION INTRAVENOUS at 14:47

## 2019-11-04 RX ADMIN — AMLODIPINE BESYLATE 5 MG: 5 TABLET ORAL at 08:46

## 2019-11-04 RX ADMIN — Medication 10 ML: at 20:33

## 2019-11-04 RX ADMIN — HYDROMORPHONE HYDROCHLORIDE 0.4 MG: 2 INJECTION INTRAMUSCULAR; INTRAVENOUS; SUBCUTANEOUS at 16:05

## 2019-11-04 RX ADMIN — AMOXICILLIN AND CLAVULANATE POTASSIUM 1 TABLET: 500; 125 TABLET, FILM COATED ORAL at 08:46

## 2019-11-04 RX ADMIN — HYDROMORPHONE HYDROCHLORIDE 0.4 MG: 2 INJECTION INTRAMUSCULAR; INTRAVENOUS; SUBCUTANEOUS at 09:40

## 2019-11-04 RX ADMIN — CLONIDINE HYDROCHLORIDE 0.2 MG: 0.2 TABLET ORAL at 08:46

## 2019-11-04 RX ADMIN — INSULIN LISPRO 2 UNITS: 100 INJECTION, SOLUTION INTRAVENOUS; SUBCUTANEOUS at 20:33

## 2019-11-04 RX ADMIN — OXYCODONE HYDROCHLORIDE AND ACETAMINOPHEN 1 TABLET: 5; 325 TABLET ORAL at 18:15

## 2019-11-04 RX ADMIN — HYDROMORPHONE HYDROCHLORIDE 0.4 MG: 2 INJECTION INTRAMUSCULAR; INTRAVENOUS; SUBCUTANEOUS at 02:57

## 2019-11-04 RX ADMIN — FUROSEMIDE 60 MG: 10 INJECTION, SOLUTION INTRAMUSCULAR; INTRAVENOUS at 08:46

## 2019-11-04 RX ADMIN — HYDROMORPHONE HYDROCHLORIDE 0.4 MG: 2 INJECTION INTRAMUSCULAR; INTRAVENOUS; SUBCUTANEOUS at 12:59

## 2019-11-04 RX ADMIN — METOPROLOL TARTRATE 5 MG: 5 INJECTION, SOLUTION INTRAVENOUS at 07:01

## 2019-11-04 RX ADMIN — OXYCODONE HYDROCHLORIDE AND ACETAMINOPHEN 1 TABLET: 5; 325 TABLET ORAL at 10:41

## 2019-11-04 RX ADMIN — INSULIN LISPRO 10 UNITS: 100 INJECTION, SOLUTION INTRAVENOUS; SUBCUTANEOUS at 10:41

## 2019-11-04 RX ADMIN — HYDROMORPHONE HYDROCHLORIDE 0.4 MG: 2 INJECTION INTRAMUSCULAR; INTRAVENOUS; SUBCUTANEOUS at 06:15

## 2019-11-04 RX ADMIN — Medication 10 ML: at 08:46

## 2019-11-04 RX ADMIN — INSULIN LISPRO 2 UNITS: 100 INJECTION, SOLUTION INTRAVENOUS; SUBCUTANEOUS at 05:37

## 2019-11-04 RX ADMIN — HYDROMORPHONE HYDROCHLORIDE 1 MG: 2 INJECTION, SOLUTION INTRAMUSCULAR; INTRAVENOUS; SUBCUTANEOUS at 21:40

## 2019-11-04 RX ADMIN — FUROSEMIDE 40 MG: 10 INJECTION, SOLUTION INTRAMUSCULAR; INTRAVENOUS at 17:05

## 2019-11-04 RX ADMIN — AMOXICILLIN AND CLAVULANATE POTASSIUM 1 TABLET: 500; 125 TABLET, FILM COATED ORAL at 20:33

## 2019-11-04 RX ADMIN — METOPROLOL TARTRATE 5 MG: 5 INJECTION, SOLUTION INTRAVENOUS at 19:01

## 2019-11-04 RX ADMIN — Medication: at 10:44

## 2019-11-04 RX ADMIN — METOPROLOL TARTRATE 5 MG: 5 INJECTION, SOLUTION INTRAVENOUS at 01:42

## 2019-11-04 RX ADMIN — ISOSORBIDE MONONITRATE 30 MG: 30 TABLET, EXTENDED RELEASE ORAL at 08:46

## 2019-11-04 RX ADMIN — INSULIN LISPRO 2 UNITS: 100 INJECTION, SOLUTION INTRAVENOUS; SUBCUTANEOUS at 01:27

## 2019-11-04 RX ADMIN — HYDROMORPHONE HYDROCHLORIDE 0.4 MG: 2 INJECTION INTRAMUSCULAR; INTRAVENOUS; SUBCUTANEOUS at 19:46

## 2019-11-04 RX ADMIN — INSULIN LISPRO 6 UNITS: 100 INJECTION, SOLUTION INTRAVENOUS; SUBCUTANEOUS at 17:05

## 2019-11-04 ASSESSMENT — PAIN DESCRIPTION - ONSET
ONSET: ON-GOING

## 2019-11-04 ASSESSMENT — PAIN DESCRIPTION - PROGRESSION
CLINICAL_PROGRESSION: NOT CHANGED
CLINICAL_PROGRESSION: GRADUALLY WORSENING
CLINICAL_PROGRESSION: GRADUALLY WORSENING
CLINICAL_PROGRESSION: NOT CHANGED
CLINICAL_PROGRESSION: GRADUALLY WORSENING

## 2019-11-04 ASSESSMENT — PAIN DESCRIPTION - LOCATION
LOCATION: ABDOMEN
LOCATION: BACK
LOCATION: ABDOMEN

## 2019-11-04 ASSESSMENT — PAIN - FUNCTIONAL ASSESSMENT
PAIN_FUNCTIONAL_ASSESSMENT: PREVENTS OR INTERFERES WITH MANY ACTIVE NOT PASSIVE ACTIVITIES
PAIN_FUNCTIONAL_ASSESSMENT: PREVENTS OR INTERFERES SOME ACTIVE ACTIVITIES AND ADLS

## 2019-11-04 ASSESSMENT — PAIN SCALES - GENERAL
PAINLEVEL_OUTOF10: 9
PAINLEVEL_OUTOF10: 8
PAINLEVEL_OUTOF10: 0
PAINLEVEL_OUTOF10: 8
PAINLEVEL_OUTOF10: 10
PAINLEVEL_OUTOF10: 4
PAINLEVEL_OUTOF10: 9
PAINLEVEL_OUTOF10: 9
PAINLEVEL_OUTOF10: 3
PAINLEVEL_OUTOF10: 9
PAINLEVEL_OUTOF10: 5
PAINLEVEL_OUTOF10: 7
PAINLEVEL_OUTOF10: 5
PAINLEVEL_OUTOF10: 9

## 2019-11-04 ASSESSMENT — PAIN DESCRIPTION - DESCRIPTORS
DESCRIPTORS: CONSTANT;DISCOMFORT;SORE
DESCRIPTORS: CONSTANT;DISCOMFORT;SORE;TENDER
DESCRIPTORS: ACHING;CONSTANT;DISCOMFORT
DESCRIPTORS: ACHING;DISCOMFORT
DESCRIPTORS: ACHING;CONSTANT;DISCOMFORT;CRAMPING
DESCRIPTORS: SHARP;STABBING
DESCRIPTORS: CONSTANT;DISCOMFORT;SORE

## 2019-11-04 ASSESSMENT — PAIN DESCRIPTION - PAIN TYPE
TYPE: ACUTE PAIN

## 2019-11-04 ASSESSMENT — PAIN DESCRIPTION - FREQUENCY
FREQUENCY: INTERMITTENT
FREQUENCY: CONTINUOUS
FREQUENCY: INTERMITTENT
FREQUENCY: CONTINUOUS
FREQUENCY: INTERMITTENT
FREQUENCY: CONTINUOUS
FREQUENCY: CONTINUOUS
FREQUENCY: INTERMITTENT

## 2019-11-04 ASSESSMENT — PAIN DESCRIPTION - ORIENTATION
ORIENTATION: RIGHT;LOWER
ORIENTATION: RIGHT;UPPER
ORIENTATION: LEFT;LOWER
ORIENTATION: RIGHT;LEFT;UPPER
ORIENTATION: RIGHT;LEFT
ORIENTATION: RIGHT;LEFT;UPPER
ORIENTATION: RIGHT;LOWER
ORIENTATION: RIGHT;LOWER
ORIENTATION: RIGHT;LEFT;UPPER

## 2019-11-05 ENCOUNTER — APPOINTMENT (OUTPATIENT)
Dept: GENERAL RADIOLOGY | Age: 29
DRG: 710 | End: 2019-11-05
Payer: COMMERCIAL

## 2019-11-05 LAB
ALBUMIN SERPL-MCNC: 2.4 G/DL (ref 3.5–5.2)
ALP BLD-CCNC: 157 U/L (ref 35–104)
ALT SERPL-CCNC: 12 U/L (ref 0–32)
ANION GAP SERPL CALCULATED.3IONS-SCNC: 17 MMOL/L (ref 7–16)
AST SERPL-CCNC: 9 U/L (ref 0–31)
BACTERIA: ABNORMAL /HPF
BILIRUB SERPL-MCNC: <0.2 MG/DL (ref 0–1.2)
BILIRUBIN URINE: NEGATIVE
BLOOD, URINE: ABNORMAL
BUN BLDV-MCNC: 55 MG/DL (ref 6–20)
CALCIUM SERPL-MCNC: 8.2 MG/DL (ref 8.6–10.2)
CASTS: ABNORMAL /LPF
CHLORIDE BLD-SCNC: 98 MMOL/L (ref 98–107)
CLARITY: CLEAR
CO2: 19 MMOL/L (ref 22–29)
COLOR: YELLOW
CREAT SERPL-MCNC: 3.3 MG/DL (ref 0.5–1)
EOSINOPHIL, URINE: 2 % (ref 0–1)
EPITHELIAL CELLS, UA: ABNORMAL /HPF
GFR AFRICAN AMERICAN: 20
GFR NON-AFRICAN AMERICAN: 20 ML/MIN/1.73
GLUCOSE BLD-MCNC: 461 MG/DL (ref 74–99)
GLUCOSE URINE: 500 MG/DL
HCT VFR BLD CALC: 25.5 % (ref 34–48)
HEMOGLOBIN: 7.8 G/DL (ref 11.5–15.5)
KETONES, URINE: NEGATIVE MG/DL
LEUKOCYTE ESTERASE, URINE: ABNORMAL
MCH RBC QN AUTO: 29.1 PG (ref 26–35)
MCHC RBC AUTO-ENTMCNC: 30.6 % (ref 32–34.5)
MCV RBC AUTO: 95.1 FL (ref 80–99.9)
METER GLUCOSE: 110 MG/DL (ref 74–99)
METER GLUCOSE: 184 MG/DL (ref 74–99)
METER GLUCOSE: 470 MG/DL (ref 74–99)
METER GLUCOSE: 475 MG/DL (ref 74–99)
METER GLUCOSE: >500 MG/DL (ref 74–99)
METER GLUCOSE: >500 MG/DL (ref 74–99)
NITRITE, URINE: NEGATIVE
PDW BLD-RTO: 18.8 FL (ref 11.5–15)
PH UA: 8 (ref 5–9)
PLATELET # BLD: 472 E9/L (ref 130–450)
PMV BLD AUTO: 10.5 FL (ref 7–12)
POTASSIUM SERPL-SCNC: 4 MMOL/L (ref 3.5–5)
PROTEIN UA: >=300 MG/DL
RBC # BLD: 2.68 E12/L (ref 3.5–5.5)
RBC UA: ABNORMAL /HPF (ref 0–2)
SODIUM BLD-SCNC: 134 MMOL/L (ref 132–146)
SPECIFIC GRAVITY UA: 1.02 (ref 1–1.03)
TOTAL PROTEIN: 6.1 G/DL (ref 6.4–8.3)
UROBILINOGEN, URINE: 0.2 E.U./DL
WBC # BLD: 5.6 E9/L (ref 4.5–11.5)
WBC UA: >20 /HPF (ref 0–5)

## 2019-11-05 PROCEDURE — 82962 GLUCOSE BLOOD TEST: CPT

## 2019-11-05 PROCEDURE — 6370000000 HC RX 637 (ALT 250 FOR IP): Performed by: INTERNAL MEDICINE

## 2019-11-05 PROCEDURE — 87425 ROTAVIRUS AG IA: CPT

## 2019-11-05 PROCEDURE — 6370000000 HC RX 637 (ALT 250 FOR IP): Performed by: NURSE PRACTITIONER

## 2019-11-05 PROCEDURE — 87045 FECES CULTURE AEROBIC BACT: CPT

## 2019-11-05 PROCEDURE — 71046 X-RAY EXAM CHEST 2 VIEWS: CPT

## 2019-11-05 PROCEDURE — 1200000000 HC SEMI PRIVATE

## 2019-11-05 PROCEDURE — 87205 SMEAR GRAM STAIN: CPT

## 2019-11-05 PROCEDURE — 6360000002 HC RX W HCPCS: Performed by: INTERNAL MEDICINE

## 2019-11-05 PROCEDURE — 2580000003 HC RX 258: Performed by: NURSE PRACTITIONER

## 2019-11-05 PROCEDURE — 85027 COMPLETE CBC AUTOMATED: CPT

## 2019-11-05 PROCEDURE — 80053 COMPREHEN METABOLIC PANEL: CPT

## 2019-11-05 PROCEDURE — 87329 GIARDIA AG IA: CPT

## 2019-11-05 PROCEDURE — G0328 FECAL BLOOD SCRN IMMUNOASSAY: HCPCS

## 2019-11-05 PROCEDURE — 36415 COLL VENOUS BLD VENIPUNCTURE: CPT

## 2019-11-05 PROCEDURE — 2500000003 HC RX 250 WO HCPCS: Performed by: INTERNAL MEDICINE

## 2019-11-05 PROCEDURE — 74018 RADEX ABDOMEN 1 VIEW: CPT

## 2019-11-05 PROCEDURE — 87328 CRYPTOSPORIDIUM AG IA: CPT

## 2019-11-05 PROCEDURE — 6360000002 HC RX W HCPCS: Performed by: NURSE PRACTITIONER

## 2019-11-05 PROCEDURE — 81001 URINALYSIS AUTO W/SCOPE: CPT

## 2019-11-05 PROCEDURE — 87088 URINE BACTERIA CULTURE: CPT

## 2019-11-05 PROCEDURE — APPSS30 APP SPLIT SHARED TIME 16-30 MINUTES: Performed by: NURSE PRACTITIONER

## 2019-11-05 PROCEDURE — 2700000000 HC OXYGEN THERAPY PER DAY

## 2019-11-05 PROCEDURE — 99233 SBSQ HOSP IP/OBS HIGH 50: CPT | Performed by: INTERNAL MEDICINE

## 2019-11-05 RX ORDER — INSULIN GLARGINE 100 [IU]/ML
6 INJECTION, SOLUTION SUBCUTANEOUS NIGHTLY
Status: DISCONTINUED | OUTPATIENT
Start: 2019-11-05 | End: 2019-11-09 | Stop reason: HOSPADM

## 2019-11-05 RX ADMIN — AMOXICILLIN AND CLAVULANATE POTASSIUM 1 TABLET: 500; 125 TABLET, FILM COATED ORAL at 20:46

## 2019-11-05 RX ADMIN — FUROSEMIDE 40 MG: 10 INJECTION, SOLUTION INTRAMUSCULAR; INTRAVENOUS at 08:15

## 2019-11-05 RX ADMIN — OXYCODONE HYDROCHLORIDE AND ACETAMINOPHEN 1 TABLET: 5; 325 TABLET ORAL at 20:46

## 2019-11-05 RX ADMIN — INSULIN LISPRO 12 UNITS: 100 INJECTION, SOLUTION INTRAVENOUS; SUBCUTANEOUS at 08:16

## 2019-11-05 RX ADMIN — OXYCODONE HYDROCHLORIDE AND ACETAMINOPHEN 1 TABLET: 5; 325 TABLET ORAL at 00:07

## 2019-11-05 RX ADMIN — PANTOPRAZOLE SODIUM 40 MG: 40 TABLET, DELAYED RELEASE ORAL at 05:57

## 2019-11-05 RX ADMIN — OXYCODONE HYDROCHLORIDE AND ACETAMINOPHEN 1 TABLET: 5; 325 TABLET ORAL at 08:15

## 2019-11-05 RX ADMIN — CLONIDINE HYDROCHLORIDE 0.2 MG: 0.2 TABLET ORAL at 08:15

## 2019-11-05 RX ADMIN — FUROSEMIDE 40 MG: 10 INJECTION, SOLUTION INTRAMUSCULAR; INTRAVENOUS at 18:35

## 2019-11-05 RX ADMIN — ISOSORBIDE MONONITRATE 30 MG: 30 TABLET, EXTENDED RELEASE ORAL at 08:15

## 2019-11-05 RX ADMIN — INSULIN GLARGINE 6 UNITS: 100 INJECTION, SOLUTION SUBCUTANEOUS at 20:47

## 2019-11-05 RX ADMIN — CLONIDINE HYDROCHLORIDE 0.2 MG: 0.2 TABLET ORAL at 20:46

## 2019-11-05 RX ADMIN — INSULIN LISPRO 12 UNITS: 100 INJECTION, SOLUTION INTRAVENOUS; SUBCUTANEOUS at 11:32

## 2019-11-05 RX ADMIN — Medication 100 UNITS: at 05:58

## 2019-11-05 RX ADMIN — Medication 10 ML: at 20:46

## 2019-11-05 RX ADMIN — Medication 10 ML: at 08:15

## 2019-11-05 RX ADMIN — AMLODIPINE BESYLATE 5 MG: 5 TABLET ORAL at 08:16

## 2019-11-05 RX ADMIN — METOPROLOL TARTRATE 5 MG: 5 INJECTION, SOLUTION INTRAVENOUS at 05:56

## 2019-11-05 RX ADMIN — OXYCODONE HYDROCHLORIDE AND ACETAMINOPHEN 1 TABLET: 5; 325 TABLET ORAL at 16:10

## 2019-11-05 RX ADMIN — CLONIDINE HYDROCHLORIDE 0.2 MG: 0.2 TABLET ORAL at 13:51

## 2019-11-05 RX ADMIN — Medication 10 ML: at 05:57

## 2019-11-05 RX ADMIN — Medication 10 ML: at 00:08

## 2019-11-05 RX ADMIN — METOPROLOL TARTRATE 5 MG: 5 INJECTION, SOLUTION INTRAVENOUS at 00:07

## 2019-11-05 RX ADMIN — INSULIN LISPRO 1 UNITS: 100 INJECTION, SOLUTION INTRAVENOUS; SUBCUTANEOUS at 20:47

## 2019-11-05 RX ADMIN — HYDROMORPHONE HYDROCHLORIDE 1 MG: 2 INJECTION, SOLUTION INTRAMUSCULAR; INTRAVENOUS; SUBCUTANEOUS at 18:40

## 2019-11-05 RX ADMIN — HYDROMORPHONE HYDROCHLORIDE 1 MG: 2 INJECTION, SOLUTION INTRAMUSCULAR; INTRAVENOUS; SUBCUTANEOUS at 14:47

## 2019-11-05 RX ADMIN — INSULIN LISPRO 10 UNITS: 100 INJECTION, SOLUTION INTRAVENOUS; SUBCUTANEOUS at 10:19

## 2019-11-05 RX ADMIN — HYDROMORPHONE HYDROCHLORIDE 1 MG: 2 INJECTION, SOLUTION INTRAMUSCULAR; INTRAVENOUS; SUBCUTANEOUS at 01:51

## 2019-11-05 RX ADMIN — HYDROMORPHONE HYDROCHLORIDE 1 MG: 2 INJECTION, SOLUTION INTRAMUSCULAR; INTRAVENOUS; SUBCUTANEOUS at 05:56

## 2019-11-05 RX ADMIN — Medication 100 UNITS: at 00:08

## 2019-11-05 RX ADMIN — METOPROLOL TARTRATE 5 MG: 5 INJECTION, SOLUTION INTRAVENOUS at 18:35

## 2019-11-05 RX ADMIN — METOPROLOL TARTRATE 5 MG: 5 INJECTION, SOLUTION INTRAVENOUS at 13:51

## 2019-11-05 RX ADMIN — AMOXICILLIN AND CLAVULANATE POTASSIUM 1 TABLET: 500; 125 TABLET, FILM COATED ORAL at 08:15

## 2019-11-05 RX ADMIN — HYDROMORPHONE HYDROCHLORIDE 1 MG: 2 INJECTION, SOLUTION INTRAMUSCULAR; INTRAVENOUS; SUBCUTANEOUS at 10:03

## 2019-11-05 ASSESSMENT — PAIN DESCRIPTION - LOCATION
LOCATION: ABDOMEN
LOCATION: BACK
LOCATION: BACK
LOCATION: ABDOMEN;BACK
LOCATION: BACK

## 2019-11-05 ASSESSMENT — PAIN DESCRIPTION - FREQUENCY
FREQUENCY: CONTINUOUS
FREQUENCY: CONTINUOUS
FREQUENCY: INTERMITTENT
FREQUENCY: CONTINUOUS

## 2019-11-05 ASSESSMENT — PAIN DESCRIPTION - ORIENTATION
ORIENTATION: LEFT;RIGHT
ORIENTATION: LEFT;LOWER
ORIENTATION: RIGHT;LEFT;LOWER
ORIENTATION: LEFT;LOWER
ORIENTATION: RIGHT;LEFT;UPPER

## 2019-11-05 ASSESSMENT — PAIN SCALES - GENERAL
PAINLEVEL_OUTOF10: 5
PAINLEVEL_OUTOF10: 8
PAINLEVEL_OUTOF10: 8
PAINLEVEL_OUTOF10: 7
PAINLEVEL_OUTOF10: 7
PAINLEVEL_OUTOF10: 8
PAINLEVEL_OUTOF10: 7

## 2019-11-05 ASSESSMENT — PAIN DESCRIPTION - PAIN TYPE
TYPE: ACUTE PAIN

## 2019-11-05 ASSESSMENT — PAIN DESCRIPTION - DESCRIPTORS
DESCRIPTORS: ACHING;SHARP
DESCRIPTORS: ACHING;CONSTANT;DISCOMFORT

## 2019-11-05 ASSESSMENT — PAIN DESCRIPTION - PROGRESSION: CLINICAL_PROGRESSION: GRADUALLY WORSENING

## 2019-11-05 ASSESSMENT — PAIN DESCRIPTION - ONSET: ONSET: ON-GOING

## 2019-11-05 ASSESSMENT — PAIN - FUNCTIONAL ASSESSMENT: PAIN_FUNCTIONAL_ASSESSMENT: PREVENTS OR INTERFERES SOME ACTIVE ACTIVITIES AND ADLS

## 2019-11-06 LAB
ALBUMIN SERPL-MCNC: 2.6 G/DL (ref 3.5–5.2)
ALP BLD-CCNC: 158 U/L (ref 35–104)
ALT SERPL-CCNC: 7 U/L (ref 0–32)
ANION GAP SERPL CALCULATED.3IONS-SCNC: 15 MMOL/L (ref 7–16)
AST SERPL-CCNC: 18 U/L (ref 0–31)
BILIRUB SERPL-MCNC: <0.2 MG/DL (ref 0–1.2)
BUN BLDV-MCNC: 57 MG/DL (ref 6–20)
CALCIUM SERPL-MCNC: 8.2 MG/DL (ref 8.6–10.2)
CHLORIDE BLD-SCNC: 101 MMOL/L (ref 98–107)
CO2: 21 MMOL/L (ref 22–29)
CREAT SERPL-MCNC: 3.4 MG/DL (ref 0.5–1)
CRYPTOSPORIDIUM ANTIGEN STOOL: NORMAL
GFR AFRICAN AMERICAN: 20
GFR NON-AFRICAN AMERICAN: 20 ML/MIN/1.73
GIARDIA ANTIGEN STOOL: NORMAL
GLUCOSE BLD-MCNC: 383 MG/DL (ref 74–99)
HCT VFR BLD CALC: 25.3 % (ref 34–48)
HEMOGLOBIN: 7.9 G/DL (ref 11.5–15.5)
MCH RBC QN AUTO: 29.2 PG (ref 26–35)
MCHC RBC AUTO-ENTMCNC: 31.2 % (ref 32–34.5)
MCV RBC AUTO: 93.4 FL (ref 80–99.9)
METER GLUCOSE: 109 MG/DL (ref 74–99)
METER GLUCOSE: 254 MG/DL (ref 74–99)
METER GLUCOSE: 372 MG/DL (ref 74–99)
METER GLUCOSE: 391 MG/DL (ref 74–99)
OCCULT BLOOD SCREENING: NORMAL
PDW BLD-RTO: 18.4 FL (ref 11.5–15)
PLATELET # BLD: 629 E9/L (ref 130–450)
PMV BLD AUTO: 10.1 FL (ref 7–12)
POTASSIUM SERPL-SCNC: 4.5 MMOL/L (ref 3.5–5)
RBC # BLD: 2.71 E12/L (ref 3.5–5.5)
ROTAVIRUS ANTIGEN: NORMAL
SODIUM BLD-SCNC: 137 MMOL/L (ref 132–146)
TOTAL PROTEIN: 6.4 G/DL (ref 6.4–8.3)
WBC # BLD: 5.2 E9/L (ref 4.5–11.5)

## 2019-11-06 PROCEDURE — 6360000002 HC RX W HCPCS: Performed by: NURSE PRACTITIONER

## 2019-11-06 PROCEDURE — 80053 COMPREHEN METABOLIC PANEL: CPT

## 2019-11-06 PROCEDURE — 6370000000 HC RX 637 (ALT 250 FOR IP): Performed by: NURSE PRACTITIONER

## 2019-11-06 PROCEDURE — 6360000002 HC RX W HCPCS: Performed by: INTERNAL MEDICINE

## 2019-11-06 PROCEDURE — 85027 COMPLETE CBC AUTOMATED: CPT

## 2019-11-06 PROCEDURE — 36415 COLL VENOUS BLD VENIPUNCTURE: CPT

## 2019-11-06 PROCEDURE — 1200000000 HC SEMI PRIVATE

## 2019-11-06 PROCEDURE — 2500000003 HC RX 250 WO HCPCS: Performed by: INTERNAL MEDICINE

## 2019-11-06 PROCEDURE — 82962 GLUCOSE BLOOD TEST: CPT

## 2019-11-06 PROCEDURE — 6370000000 HC RX 637 (ALT 250 FOR IP): Performed by: INTERNAL MEDICINE

## 2019-11-06 PROCEDURE — 2580000003 HC RX 258: Performed by: NURSE PRACTITIONER

## 2019-11-06 PROCEDURE — 99233 SBSQ HOSP IP/OBS HIGH 50: CPT | Performed by: INTERNAL MEDICINE

## 2019-11-06 PROCEDURE — APPSS30 APP SPLIT SHARED TIME 16-30 MINUTES: Performed by: NURSE PRACTITIONER

## 2019-11-06 RX ADMIN — Medication 10 ML: at 14:02

## 2019-11-06 RX ADMIN — METOPROLOL TARTRATE 5 MG: 5 INJECTION, SOLUTION INTRAVENOUS at 19:07

## 2019-11-06 RX ADMIN — CLONIDINE HYDROCHLORIDE 0.2 MG: 0.2 TABLET ORAL at 14:01

## 2019-11-06 RX ADMIN — Medication 100 UNITS: at 00:59

## 2019-11-06 RX ADMIN — Medication 100 UNITS: at 20:55

## 2019-11-06 RX ADMIN — Medication 10 ML: at 20:54

## 2019-11-06 RX ADMIN — METOPROLOL TARTRATE 5 MG: 5 INJECTION, SOLUTION INTRAVENOUS at 00:57

## 2019-11-06 RX ADMIN — CLONIDINE HYDROCHLORIDE 0.2 MG: 0.2 TABLET ORAL at 08:38

## 2019-11-06 RX ADMIN — FUROSEMIDE 40 MG: 10 INJECTION, SOLUTION INTRAMUSCULAR; INTRAVENOUS at 19:07

## 2019-11-06 RX ADMIN — PANTOPRAZOLE SODIUM 40 MG: 40 TABLET, DELAYED RELEASE ORAL at 06:19

## 2019-11-06 RX ADMIN — CLONIDINE HYDROCHLORIDE 0.2 MG: 0.2 TABLET ORAL at 20:54

## 2019-11-06 RX ADMIN — INSULIN LISPRO 6 UNITS: 100 INJECTION, SOLUTION INTRAVENOUS; SUBCUTANEOUS at 12:30

## 2019-11-06 RX ADMIN — INSULIN LISPRO 5 UNITS: 100 INJECTION, SOLUTION INTRAVENOUS; SUBCUTANEOUS at 20:55

## 2019-11-06 RX ADMIN — Medication 100 UNITS: at 14:02

## 2019-11-06 RX ADMIN — AMOXICILLIN AND CLAVULANATE POTASSIUM 1 TABLET: 500; 125 TABLET, FILM COATED ORAL at 08:38

## 2019-11-06 RX ADMIN — AMLODIPINE BESYLATE 5 MG: 5 TABLET ORAL at 08:38

## 2019-11-06 RX ADMIN — OXYCODONE HYDROCHLORIDE AND ACETAMINOPHEN 1 TABLET: 5; 325 TABLET ORAL at 20:54

## 2019-11-06 RX ADMIN — METOPROLOL TARTRATE 5 MG: 5 INJECTION, SOLUTION INTRAVENOUS at 06:19

## 2019-11-06 RX ADMIN — OXYCODONE HYDROCHLORIDE AND ACETAMINOPHEN 1 TABLET: 5; 325 TABLET ORAL at 16:05

## 2019-11-06 RX ADMIN — FUROSEMIDE 40 MG: 10 INJECTION, SOLUTION INTRAMUSCULAR; INTRAVENOUS at 09:53

## 2019-11-06 RX ADMIN — AMOXICILLIN AND CLAVULANATE POTASSIUM 1 TABLET: 500; 125 TABLET, FILM COATED ORAL at 20:54

## 2019-11-06 RX ADMIN — METOPROLOL TARTRATE 5 MG: 5 INJECTION, SOLUTION INTRAVENOUS at 14:01

## 2019-11-06 RX ADMIN — OXYCODONE HYDROCHLORIDE AND ACETAMINOPHEN 1 TABLET: 5; 325 TABLET ORAL at 00:57

## 2019-11-06 RX ADMIN — Medication 10 ML: at 08:40

## 2019-11-06 RX ADMIN — INSULIN LISPRO 10 UNITS: 100 INJECTION, SOLUTION INTRAVENOUS; SUBCUTANEOUS at 07:42

## 2019-11-06 RX ADMIN — DIPHENOXYLATE HYDROCHLORIDE AND ATROPINE SULFATE 2 TABLET: 2.5; .025 TABLET ORAL at 16:05

## 2019-11-06 RX ADMIN — ISOSORBIDE MONONITRATE 30 MG: 30 TABLET, EXTENDED RELEASE ORAL at 08:38

## 2019-11-06 RX ADMIN — OXYCODONE HYDROCHLORIDE AND ACETAMINOPHEN 1 TABLET: 5; 325 TABLET ORAL at 11:15

## 2019-11-06 RX ADMIN — INSULIN GLARGINE 6 UNITS: 100 INJECTION, SOLUTION SUBCUTANEOUS at 20:55

## 2019-11-06 RX ADMIN — Medication 10 ML: at 00:58

## 2019-11-06 RX ADMIN — OXYCODONE HYDROCHLORIDE AND ACETAMINOPHEN 1 TABLET: 5; 325 TABLET ORAL at 06:19

## 2019-11-06 ASSESSMENT — PAIN DESCRIPTION - LOCATION
LOCATION: BACK

## 2019-11-06 ASSESSMENT — PAIN DESCRIPTION - PAIN TYPE
TYPE: ACUTE PAIN

## 2019-11-06 ASSESSMENT — PAIN DESCRIPTION - ORIENTATION
ORIENTATION: RIGHT;LEFT;LOWER

## 2019-11-06 ASSESSMENT — PAIN SCALES - GENERAL
PAINLEVEL_OUTOF10: 9
PAINLEVEL_OUTOF10: 7
PAINLEVEL_OUTOF10: 6
PAINLEVEL_OUTOF10: 8
PAINLEVEL_OUTOF10: 8
PAINLEVEL_OUTOF10: 7
PAINLEVEL_OUTOF10: 8

## 2019-11-06 ASSESSMENT — PAIN DESCRIPTION - DESCRIPTORS
DESCRIPTORS: ACHING;CONSTANT;DISCOMFORT

## 2019-11-06 ASSESSMENT — PAIN DESCRIPTION - FREQUENCY
FREQUENCY: CONTINUOUS
FREQUENCY: CONTINUOUS

## 2019-11-07 ENCOUNTER — ANESTHESIA (OUTPATIENT)
Dept: OPERATING ROOM | Age: 29
DRG: 710 | End: 2019-11-07
Payer: COMMERCIAL

## 2019-11-07 ENCOUNTER — ANESTHESIA EVENT (OUTPATIENT)
Dept: OPERATING ROOM | Age: 29
DRG: 710 | End: 2019-11-07
Payer: COMMERCIAL

## 2019-11-07 VITALS
TEMPERATURE: 96.1 F | RESPIRATION RATE: 1 BRPM | SYSTOLIC BLOOD PRESSURE: 135 MMHG | OXYGEN SATURATION: 100 % | DIASTOLIC BLOOD PRESSURE: 87 MMHG

## 2019-11-07 LAB
ALBUMIN SERPL-MCNC: 2.6 G/DL (ref 3.5–5.2)
ALP BLD-CCNC: 142 U/L (ref 35–104)
ALT SERPL-CCNC: 10 U/L (ref 0–32)
ANION GAP SERPL CALCULATED.3IONS-SCNC: 11 MMOL/L (ref 7–16)
AST SERPL-CCNC: 12 U/L (ref 0–31)
BILIRUB SERPL-MCNC: <0.2 MG/DL (ref 0–1.2)
BUN BLDV-MCNC: 53 MG/DL (ref 6–20)
CALCIUM SERPL-MCNC: 8.9 MG/DL (ref 8.6–10.2)
CHLORIDE BLD-SCNC: 107 MMOL/L (ref 98–107)
CO2: 24 MMOL/L (ref 22–29)
CREAT SERPL-MCNC: 3.3 MG/DL (ref 0.5–1)
CULTURE, STOOL: NORMAL
GFR AFRICAN AMERICAN: 20
GFR NON-AFRICAN AMERICAN: 20 ML/MIN/1.73
GLUCOSE BLD-MCNC: 145 MG/DL (ref 74–99)
HCG QUALITATIVE: NEGATIVE
HCT VFR BLD CALC: 29.6 % (ref 34–48)
HEMOGLOBIN: 9.4 G/DL (ref 11.5–15.5)
MCH RBC QN AUTO: 29.5 PG (ref 26–35)
MCHC RBC AUTO-ENTMCNC: 31.8 % (ref 32–34.5)
MCV RBC AUTO: 92.8 FL (ref 80–99.9)
METER GLUCOSE: 151 MG/DL (ref 74–99)
METER GLUCOSE: 186 MG/DL (ref 74–99)
METER GLUCOSE: 237 MG/DL (ref 74–99)
METER GLUCOSE: 277 MG/DL (ref 74–99)
PDW BLD-RTO: 18.4 FL (ref 11.5–15)
PLATELET # BLD: 613 E9/L (ref 130–450)
PMV BLD AUTO: 9.3 FL (ref 7–12)
POTASSIUM SERPL-SCNC: 3.9 MMOL/L (ref 3.5–5)
RBC # BLD: 3.19 E12/L (ref 3.5–5.5)
SODIUM BLD-SCNC: 142 MMOL/L (ref 132–146)
TOTAL PROTEIN: 6.1 G/DL (ref 6.4–8.3)
URINE CULTURE, ROUTINE: NORMAL
WBC # BLD: 4.2 E9/L (ref 4.5–11.5)

## 2019-11-07 PROCEDURE — 2720000010 HC SURG SUPPLY STERILE: Performed by: SURGERY

## 2019-11-07 PROCEDURE — 3700000000 HC ANESTHESIA ATTENDED CARE: Performed by: SURGERY

## 2019-11-07 PROCEDURE — 6370000000 HC RX 637 (ALT 250 FOR IP): Performed by: SURGERY

## 2019-11-07 PROCEDURE — 1200000000 HC SEMI PRIVATE

## 2019-11-07 PROCEDURE — 80053 COMPREHEN METABOLIC PANEL: CPT

## 2019-11-07 PROCEDURE — 6360000002 HC RX W HCPCS: Performed by: ANESTHESIOLOGY

## 2019-11-07 PROCEDURE — 97165 OT EVAL LOW COMPLEX 30 MIN: CPT

## 2019-11-07 PROCEDURE — 2500000003 HC RX 250 WO HCPCS: Performed by: NURSE ANESTHETIST, CERTIFIED REGISTERED

## 2019-11-07 PROCEDURE — 2700000000 HC OXYGEN THERAPY PER DAY

## 2019-11-07 PROCEDURE — 2500000003 HC RX 250 WO HCPCS: Performed by: INTERNAL MEDICINE

## 2019-11-07 PROCEDURE — 97161 PT EVAL LOW COMPLEX 20 MIN: CPT | Performed by: PHYSICAL THERAPIST

## 2019-11-07 PROCEDURE — 84703 CHORIONIC GONADOTROPIN ASSAY: CPT

## 2019-11-07 PROCEDURE — 7100000001 HC PACU RECOVERY - ADDTL 15 MIN: Performed by: SURGERY

## 2019-11-07 PROCEDURE — 47562 LAPAROSCOPIC CHOLECYSTECTOMY: CPT | Performed by: SURGERY

## 2019-11-07 PROCEDURE — 2580000003 HC RX 258: Performed by: NURSE PRACTITIONER

## 2019-11-07 PROCEDURE — 2709999900 HC NON-CHARGEABLE SUPPLY: Performed by: SURGERY

## 2019-11-07 PROCEDURE — 82962 GLUCOSE BLOOD TEST: CPT

## 2019-11-07 PROCEDURE — 2500000003 HC RX 250 WO HCPCS: Performed by: SURGERY

## 2019-11-07 PROCEDURE — 6360000002 HC RX W HCPCS: Performed by: NURSE ANESTHETIST, CERTIFIED REGISTERED

## 2019-11-07 PROCEDURE — 99233 SBSQ HOSP IP/OBS HIGH 50: CPT | Performed by: INTERNAL MEDICINE

## 2019-11-07 PROCEDURE — 36415 COLL VENOUS BLD VENIPUNCTURE: CPT

## 2019-11-07 PROCEDURE — APPSS30 APP SPLIT SHARED TIME 16-30 MINUTES: Performed by: NURSE PRACTITIONER

## 2019-11-07 PROCEDURE — 2580000003 HC RX 258: Performed by: SURGERY

## 2019-11-07 PROCEDURE — 6370000000 HC RX 637 (ALT 250 FOR IP): Performed by: NURSE PRACTITIONER

## 2019-11-07 PROCEDURE — 0FT44ZZ RESECTION OF GALLBLADDER, PERCUTANEOUS ENDOSCOPIC APPROACH: ICD-10-PCS | Performed by: SURGERY

## 2019-11-07 PROCEDURE — 88304 TISSUE EXAM BY PATHOLOGIST: CPT

## 2019-11-07 PROCEDURE — 3600000004 HC SURGERY LEVEL 4 BASE: Performed by: SURGERY

## 2019-11-07 PROCEDURE — 2580000003 HC RX 258: Performed by: NURSE ANESTHETIST, CERTIFIED REGISTERED

## 2019-11-07 PROCEDURE — 85027 COMPLETE CBC AUTOMATED: CPT

## 2019-11-07 PROCEDURE — 97530 THERAPEUTIC ACTIVITIES: CPT | Performed by: PHYSICAL THERAPIST

## 2019-11-07 PROCEDURE — 99232 SBSQ HOSP IP/OBS MODERATE 35: CPT | Performed by: SURGERY

## 2019-11-07 PROCEDURE — 7100000000 HC PACU RECOVERY - FIRST 15 MIN: Performed by: SURGERY

## 2019-11-07 PROCEDURE — 3700000001 HC ADD 15 MINUTES (ANESTHESIA): Performed by: SURGERY

## 2019-11-07 PROCEDURE — 3600000014 HC SURGERY LEVEL 4 ADDTL 15MIN: Performed by: SURGERY

## 2019-11-07 PROCEDURE — 97530 THERAPEUTIC ACTIVITIES: CPT

## 2019-11-07 PROCEDURE — 36591 DRAW BLOOD OFF VENOUS DEVICE: CPT

## 2019-11-07 PROCEDURE — 6360000002 HC RX W HCPCS: Performed by: NURSE PRACTITIONER

## 2019-11-07 PROCEDURE — 6370000000 HC RX 637 (ALT 250 FOR IP): Performed by: INTERNAL MEDICINE

## 2019-11-07 RX ORDER — LIDOCAINE HYDROCHLORIDE 20 MG/ML
INJECTION, SOLUTION INTRAVENOUS PRN
Status: DISCONTINUED | OUTPATIENT
Start: 2019-11-07 | End: 2019-11-07 | Stop reason: SDUPTHER

## 2019-11-07 RX ORDER — FENTANYL CITRATE 50 UG/ML
INJECTION, SOLUTION INTRAMUSCULAR; INTRAVENOUS PRN
Status: DISCONTINUED | OUTPATIENT
Start: 2019-11-07 | End: 2019-11-07 | Stop reason: SDUPTHER

## 2019-11-07 RX ORDER — HYDROCODONE BITARTRATE AND ACETAMINOPHEN 5; 325 MG/1; MG/1
2 TABLET ORAL EVERY 4 HOURS PRN
Status: DISCONTINUED | OUTPATIENT
Start: 2019-11-07 | End: 2019-11-08

## 2019-11-07 RX ORDER — ONDANSETRON 2 MG/ML
INJECTION INTRAMUSCULAR; INTRAVENOUS PRN
Status: DISCONTINUED | OUTPATIENT
Start: 2019-11-07 | End: 2019-11-07 | Stop reason: SDUPTHER

## 2019-11-07 RX ORDER — ROCURONIUM BROMIDE 10 MG/ML
INJECTION, SOLUTION INTRAVENOUS PRN
Status: DISCONTINUED | OUTPATIENT
Start: 2019-11-07 | End: 2019-11-07 | Stop reason: SDUPTHER

## 2019-11-07 RX ORDER — HYDROMORPHONE HYDROCHLORIDE 1 MG/ML
0.5 INJECTION, SOLUTION INTRAMUSCULAR; INTRAVENOUS; SUBCUTANEOUS EVERY 5 MIN PRN
Status: COMPLETED | OUTPATIENT
Start: 2019-11-07 | End: 2019-11-07

## 2019-11-07 RX ORDER — CEFAZOLIN SODIUM 2 G/50ML
SOLUTION INTRAVENOUS PRN
Status: DISCONTINUED | OUTPATIENT
Start: 2019-11-07 | End: 2019-11-07 | Stop reason: SDUPTHER

## 2019-11-07 RX ORDER — GLYCOPYRROLATE 1 MG/5 ML
SYRINGE (ML) INTRAVENOUS PRN
Status: DISCONTINUED | OUTPATIENT
Start: 2019-11-07 | End: 2019-11-07 | Stop reason: SDUPTHER

## 2019-11-07 RX ORDER — PROPOFOL 10 MG/ML
INJECTION, EMULSION INTRAVENOUS PRN
Status: DISCONTINUED | OUTPATIENT
Start: 2019-11-07 | End: 2019-11-07 | Stop reason: SDUPTHER

## 2019-11-07 RX ORDER — MEPERIDINE HYDROCHLORIDE 25 MG/ML
INJECTION INTRAMUSCULAR; INTRAVENOUS; SUBCUTANEOUS
Status: DISPENSED
Start: 2019-11-07 | End: 2019-11-08

## 2019-11-07 RX ORDER — ONDANSETRON 2 MG/ML
4 INJECTION INTRAMUSCULAR; INTRAVENOUS
Status: DISCONTINUED | OUTPATIENT
Start: 2019-11-07 | End: 2019-11-07 | Stop reason: HOSPADM

## 2019-11-07 RX ORDER — SODIUM CHLORIDE 9 MG/ML
INJECTION, SOLUTION INTRAVENOUS CONTINUOUS PRN
Status: DISCONTINUED | OUTPATIENT
Start: 2019-11-07 | End: 2019-11-07 | Stop reason: SDUPTHER

## 2019-11-07 RX ORDER — HYDROMORPHONE HYDROCHLORIDE 1 MG/ML
0.25 INJECTION, SOLUTION INTRAMUSCULAR; INTRAVENOUS; SUBCUTANEOUS EVERY 5 MIN PRN
Status: DISCONTINUED | OUTPATIENT
Start: 2019-11-07 | End: 2019-11-07 | Stop reason: HOSPADM

## 2019-11-07 RX ORDER — HYDROCODONE BITARTRATE AND ACETAMINOPHEN 5; 325 MG/1; MG/1
1 TABLET ORAL EVERY 6 HOURS PRN
Qty: 10 TABLET | Refills: 0 | Status: SHIPPED | OUTPATIENT
Start: 2019-11-07 | End: 2019-11-09 | Stop reason: HOSPADM

## 2019-11-07 RX ORDER — HYDROCODONE BITARTRATE AND ACETAMINOPHEN 5; 325 MG/1; MG/1
1 TABLET ORAL EVERY 4 HOURS PRN
Status: DISCONTINUED | OUTPATIENT
Start: 2019-11-07 | End: 2019-11-08

## 2019-11-07 RX ORDER — MIDAZOLAM HYDROCHLORIDE 1 MG/ML
INJECTION INTRAMUSCULAR; INTRAVENOUS PRN
Status: DISCONTINUED | OUTPATIENT
Start: 2019-11-07 | End: 2019-11-07 | Stop reason: SDUPTHER

## 2019-11-07 RX ORDER — MEPERIDINE HYDROCHLORIDE 25 MG/ML
12.5 INJECTION INTRAMUSCULAR; INTRAVENOUS; SUBCUTANEOUS EVERY 5 MIN PRN
Status: DISCONTINUED | OUTPATIENT
Start: 2019-11-07 | End: 2019-11-07 | Stop reason: HOSPADM

## 2019-11-07 RX ORDER — NEOSTIGMINE METHYLSULFATE 1 MG/ML
INJECTION, SOLUTION INTRAVENOUS PRN
Status: DISCONTINUED | OUTPATIENT
Start: 2019-11-07 | End: 2019-11-07 | Stop reason: SDUPTHER

## 2019-11-07 RX ORDER — BUPIVACAINE HYDROCHLORIDE AND EPINEPHRINE 2.5; 5 MG/ML; UG/ML
INJECTION, SOLUTION EPIDURAL; INFILTRATION; INTRACAUDAL; PERINEURAL PRN
Status: DISCONTINUED | OUTPATIENT
Start: 2019-11-07 | End: 2019-11-07 | Stop reason: ALTCHOICE

## 2019-11-07 RX ADMIN — OXYCODONE HYDROCHLORIDE AND ACETAMINOPHEN 1 TABLET: 5; 325 TABLET ORAL at 10:20

## 2019-11-07 RX ADMIN — HYDROMORPHONE HYDROCHLORIDE 0.5 MG: 1 INJECTION, SOLUTION INTRAMUSCULAR; INTRAVENOUS; SUBCUTANEOUS at 15:40

## 2019-11-07 RX ADMIN — AMOXICILLIN AND CLAVULANATE POTASSIUM 1 TABLET: 500; 125 TABLET, FILM COATED ORAL at 09:36

## 2019-11-07 RX ADMIN — PANTOPRAZOLE SODIUM 40 MG: 40 TABLET, DELAYED RELEASE ORAL at 06:13

## 2019-11-07 RX ADMIN — METOPROLOL TARTRATE 5 MG: 5 INJECTION, SOLUTION INTRAVENOUS at 19:35

## 2019-11-07 RX ADMIN — CLONIDINE HYDROCHLORIDE 0.2 MG: 0.2 TABLET ORAL at 09:36

## 2019-11-07 RX ADMIN — METOPROLOL TARTRATE 5 MG: 5 INJECTION, SOLUTION INTRAVENOUS at 06:56

## 2019-11-07 RX ADMIN — Medication 10 ML: at 13:07

## 2019-11-07 RX ADMIN — SODIUM CHLORIDE: 9 INJECTION, SOLUTION INTRAVENOUS at 14:20

## 2019-11-07 RX ADMIN — HYDROMORPHONE HYDROCHLORIDE 0.5 MG: 1 INJECTION, SOLUTION INTRAMUSCULAR; INTRAVENOUS; SUBCUTANEOUS at 15:15

## 2019-11-07 RX ADMIN — OXYCODONE HYDROCHLORIDE AND ACETAMINOPHEN 1 TABLET: 5; 325 TABLET ORAL at 02:13

## 2019-11-07 RX ADMIN — MIDAZOLAM 2 MG: 1 INJECTION INTRAMUSCULAR; INTRAVENOUS at 14:20

## 2019-11-07 RX ADMIN — AMOXICILLIN AND CLAVULANATE POTASSIUM 1 TABLET: 500; 125 TABLET, FILM COATED ORAL at 22:31

## 2019-11-07 RX ADMIN — ISOSORBIDE MONONITRATE 30 MG: 30 TABLET, EXTENDED RELEASE ORAL at 09:36

## 2019-11-07 RX ADMIN — INSULIN LISPRO 6 UNITS: 100 INJECTION, SOLUTION INTRAVENOUS; SUBCUTANEOUS at 19:34

## 2019-11-07 RX ADMIN — FENTANYL CITRATE 100 MCG: 50 INJECTION, SOLUTION INTRAMUSCULAR; INTRAVENOUS at 14:53

## 2019-11-07 RX ADMIN — HYDROMORPHONE HYDROCHLORIDE 0.5 MG: 1 INJECTION, SOLUTION INTRAMUSCULAR; INTRAVENOUS; SUBCUTANEOUS at 15:45

## 2019-11-07 RX ADMIN — INSULIN GLARGINE 6 UNITS: 100 INJECTION, SOLUTION SUBCUTANEOUS at 22:33

## 2019-11-07 RX ADMIN — HYDROCODONE BITARTRATE AND ACETAMINOPHEN 2 TABLET: 5; 325 TABLET ORAL at 23:35

## 2019-11-07 RX ADMIN — HYDROMORPHONE HYDROCHLORIDE 0.5 MG: 1 INJECTION, SOLUTION INTRAMUSCULAR; INTRAVENOUS; SUBCUTANEOUS at 15:20

## 2019-11-07 RX ADMIN — MEPERIDINE HYDROCHLORIDE 12.5 MG: 25 INJECTION INTRAMUSCULAR; INTRAVENOUS; SUBCUTANEOUS at 16:10

## 2019-11-07 RX ADMIN — CEFAZOLIN SODIUM 2 G: 2 SOLUTION INTRAVENOUS at 14:20

## 2019-11-07 RX ADMIN — Medication 0.6 MG: at 14:53

## 2019-11-07 RX ADMIN — AMLODIPINE BESYLATE 5 MG: 5 TABLET ORAL at 09:36

## 2019-11-07 RX ADMIN — Medication 100 UNITS: at 09:35

## 2019-11-07 RX ADMIN — METOPROLOL TARTRATE 5 MG: 5 INJECTION, SOLUTION INTRAVENOUS at 13:07

## 2019-11-07 RX ADMIN — Medication 10 ML: at 01:00

## 2019-11-07 RX ADMIN — LIDOCAINE HYDROCHLORIDE 40 MG: 20 INJECTION, SOLUTION INTRAVENOUS at 14:23

## 2019-11-07 RX ADMIN — ONDANSETRON HYDROCHLORIDE 4 MG: 2 INJECTION, SOLUTION INTRAMUSCULAR; INTRAVENOUS at 14:54

## 2019-11-07 RX ADMIN — FENTANYL CITRATE 100 MCG: 50 INJECTION, SOLUTION INTRAMUSCULAR; INTRAVENOUS at 14:23

## 2019-11-07 RX ADMIN — Medication 40 MG: at 14:23

## 2019-11-07 RX ADMIN — MEPERIDINE HYDROCHLORIDE 12.5 MG: 25 INJECTION INTRAMUSCULAR; INTRAVENOUS; SUBCUTANEOUS at 16:15

## 2019-11-07 RX ADMIN — HYDROCODONE BITARTRATE AND ACETAMINOPHEN 2 TABLET: 5; 325 TABLET ORAL at 19:32

## 2019-11-07 RX ADMIN — OXYCODONE HYDROCHLORIDE AND ACETAMINOPHEN 1 TABLET: 5; 325 TABLET ORAL at 06:13

## 2019-11-07 RX ADMIN — METOPROLOL TARTRATE 5 MG: 5 INJECTION, SOLUTION INTRAVENOUS at 00:59

## 2019-11-07 RX ADMIN — Medication 10 ML: at 21:00

## 2019-11-07 RX ADMIN — PROPOFOL 150 MG: 10 INJECTION, EMULSION INTRAVENOUS at 14:23

## 2019-11-07 RX ADMIN — INSULIN LISPRO 2 UNITS: 100 INJECTION, SOLUTION INTRAVENOUS; SUBCUTANEOUS at 22:33

## 2019-11-07 RX ADMIN — Medication 3 MG: at 14:53

## 2019-11-07 RX ADMIN — Medication 10 ML: at 09:35

## 2019-11-07 RX ADMIN — CLONIDINE HYDROCHLORIDE 0.2 MG: 0.2 TABLET ORAL at 22:30

## 2019-11-07 ASSESSMENT — PULMONARY FUNCTION TESTS
PIF_VALUE: 30
PIF_VALUE: 24
PIF_VALUE: 27
PIF_VALUE: 5
PIF_VALUE: 0
PIF_VALUE: 26
PIF_VALUE: 24
PIF_VALUE: 34
PIF_VALUE: 1
PIF_VALUE: 26
PIF_VALUE: 27
PIF_VALUE: 24
PIF_VALUE: 25
PIF_VALUE: 26
PIF_VALUE: 26
PIF_VALUE: 28
PIF_VALUE: 1
PIF_VALUE: 33
PIF_VALUE: 27
PIF_VALUE: 23
PIF_VALUE: 33
PIF_VALUE: 27
PIF_VALUE: 1
PIF_VALUE: 34
PIF_VALUE: 35
PIF_VALUE: 32
PIF_VALUE: 1
PIF_VALUE: 1
PIF_VALUE: 26
PIF_VALUE: 0
PIF_VALUE: 28
PIF_VALUE: 25
PIF_VALUE: 26
PIF_VALUE: 28
PIF_VALUE: 27
PIF_VALUE: 27
PIF_VALUE: 1
PIF_VALUE: 34
PIF_VALUE: 0
PIF_VALUE: 34
PIF_VALUE: 7
PIF_VALUE: 27
PIF_VALUE: 26
PIF_VALUE: 0
PIF_VALUE: 1
PIF_VALUE: 34

## 2019-11-07 ASSESSMENT — PAIN SCALES - GENERAL
PAINLEVEL_OUTOF10: 0
PAINLEVEL_OUTOF10: 8
PAINLEVEL_OUTOF10: 0
PAINLEVEL_OUTOF10: 4
PAINLEVEL_OUTOF10: 8
PAINLEVEL_OUTOF10: 10
PAINLEVEL_OUTOF10: 6
PAINLEVEL_OUTOF10: 5
PAINLEVEL_OUTOF10: 10
PAINLEVEL_OUTOF10: 5
PAINLEVEL_OUTOF10: 3
PAINLEVEL_OUTOF10: 0
PAINLEVEL_OUTOF10: 6
PAINLEVEL_OUTOF10: 8
PAINLEVEL_OUTOF10: 7
PAINLEVEL_OUTOF10: 8
PAINLEVEL_OUTOF10: 7
PAINLEVEL_OUTOF10: 5

## 2019-11-07 ASSESSMENT — PAIN - FUNCTIONAL ASSESSMENT
PAIN_FUNCTIONAL_ASSESSMENT: ACTIVITIES ARE NOT PREVENTED
PAIN_FUNCTIONAL_ASSESSMENT: PREVENTS OR INTERFERES SOME ACTIVE ACTIVITIES AND ADLS
PAIN_FUNCTIONAL_ASSESSMENT: ACTIVITIES ARE NOT PREVENTED

## 2019-11-07 ASSESSMENT — PAIN DESCRIPTION - LOCATION
LOCATION: ABDOMEN

## 2019-11-07 ASSESSMENT — PAIN DESCRIPTION - PAIN TYPE
TYPE: SURGICAL PAIN
TYPE: ACUTE PAIN
TYPE: SURGICAL PAIN
TYPE: ACUTE PAIN
TYPE: ACUTE PAIN
TYPE: SURGICAL PAIN

## 2019-11-07 ASSESSMENT — PAIN DESCRIPTION - DESCRIPTORS
DESCRIPTORS: CONSTANT;DISCOMFORT;SORE
DESCRIPTORS: ACHING;BURNING
DESCRIPTORS: ACHING;BURNING
DESCRIPTORS: BURNING;ACHING
DESCRIPTORS: ACHING;BURNING
DESCRIPTORS: CONSTANT;DISCOMFORT;SORE
DESCRIPTORS: ACHING;BURNING;CONSTANT
DESCRIPTORS: ACHING
DESCRIPTORS: BURNING;ACHING
DESCRIPTORS: CONSTANT;DISCOMFORT;SORE

## 2019-11-07 ASSESSMENT — LIFESTYLE VARIABLES: SMOKING_STATUS: 1

## 2019-11-07 ASSESSMENT — PAIN DESCRIPTION - ONSET
ONSET: ON-GOING
ONSET: GRADUAL
ONSET: ON-GOING
ONSET: GRADUAL
ONSET: ON-GOING
ONSET: ON-GOING
ONSET: GRADUAL

## 2019-11-07 ASSESSMENT — PAIN DESCRIPTION - FREQUENCY
FREQUENCY: CONTINUOUS

## 2019-11-07 ASSESSMENT — PAIN DESCRIPTION - PROGRESSION
CLINICAL_PROGRESSION: GRADUALLY IMPROVING
CLINICAL_PROGRESSION: NOT CHANGED
CLINICAL_PROGRESSION: GRADUALLY IMPROVING
CLINICAL_PROGRESSION: GRADUALLY IMPROVING
CLINICAL_PROGRESSION: NOT CHANGED
CLINICAL_PROGRESSION: GRADUALLY IMPROVING
CLINICAL_PROGRESSION: GRADUALLY WORSENING
CLINICAL_PROGRESSION: GRADUALLY IMPROVING
CLINICAL_PROGRESSION: NOT CHANGED

## 2019-11-07 ASSESSMENT — PAIN DESCRIPTION - ORIENTATION
ORIENTATION: RIGHT;LEFT;LOWER
ORIENTATION: MID
ORIENTATION: RIGHT;LEFT;LOWER
ORIENTATION: MID
ORIENTATION: LEFT;RIGHT;LOWER
ORIENTATION: MID

## 2019-11-07 ASSESSMENT — ENCOUNTER SYMPTOMS: SHORTNESS OF BREATH: 0

## 2019-11-08 LAB
ALBUMIN SERPL-MCNC: 2.7 G/DL (ref 3.5–5.2)
ALP BLD-CCNC: 143 U/L (ref 35–104)
ALT SERPL-CCNC: 17 U/L (ref 0–32)
ANION GAP SERPL CALCULATED.3IONS-SCNC: 13 MMOL/L (ref 7–16)
AST SERPL-CCNC: 28 U/L (ref 0–31)
BILIRUB SERPL-MCNC: <0.2 MG/DL (ref 0–1.2)
BUN BLDV-MCNC: 47 MG/DL (ref 6–20)
CALCIUM SERPL-MCNC: 8.6 MG/DL (ref 8.6–10.2)
CHLORIDE BLD-SCNC: 108 MMOL/L (ref 98–107)
CO2: 21 MMOL/L (ref 22–29)
CREAT SERPL-MCNC: 2.9 MG/DL (ref 0.5–1)
GFR AFRICAN AMERICAN: 23
GFR NON-AFRICAN AMERICAN: 23 ML/MIN/1.73
GLUCOSE BLD-MCNC: 206 MG/DL (ref 74–99)
HCT VFR BLD CALC: 25.5 % (ref 34–48)
HEMOGLOBIN: 8.1 G/DL (ref 11.5–15.5)
MCH RBC QN AUTO: 29.5 PG (ref 26–35)
MCHC RBC AUTO-ENTMCNC: 31.8 % (ref 32–34.5)
MCV RBC AUTO: 92.7 FL (ref 80–99.9)
METER GLUCOSE: 147 MG/DL (ref 74–99)
METER GLUCOSE: 164 MG/DL (ref 74–99)
METER GLUCOSE: 190 MG/DL (ref 74–99)
METER GLUCOSE: 207 MG/DL (ref 74–99)
METER GLUCOSE: 209 MG/DL (ref 74–99)
PDW BLD-RTO: 18.8 FL (ref 11.5–15)
PLATELET # BLD: 777 E9/L (ref 130–450)
PMV BLD AUTO: 9.6 FL (ref 7–12)
POTASSIUM SERPL-SCNC: 4.5 MMOL/L (ref 3.5–5)
RBC # BLD: 2.75 E12/L (ref 3.5–5.5)
SODIUM BLD-SCNC: 142 MMOL/L (ref 132–146)
TOTAL PROTEIN: 6.1 G/DL (ref 6.4–8.3)
WBC # BLD: 7.3 E9/L (ref 4.5–11.5)

## 2019-11-08 PROCEDURE — 6370000000 HC RX 637 (ALT 250 FOR IP): Performed by: NURSE PRACTITIONER

## 2019-11-08 PROCEDURE — 97530 THERAPEUTIC ACTIVITIES: CPT

## 2019-11-08 PROCEDURE — 85027 COMPLETE CBC AUTOMATED: CPT

## 2019-11-08 PROCEDURE — 6370000000 HC RX 637 (ALT 250 FOR IP): Performed by: SURGERY

## 2019-11-08 PROCEDURE — 99232 SBSQ HOSP IP/OBS MODERATE 35: CPT | Performed by: INTERNAL MEDICINE

## 2019-11-08 PROCEDURE — 80053 COMPREHEN METABOLIC PANEL: CPT

## 2019-11-08 PROCEDURE — 99024 POSTOP FOLLOW-UP VISIT: CPT | Performed by: SURGERY

## 2019-11-08 PROCEDURE — 97116 GAIT TRAINING THERAPY: CPT

## 2019-11-08 PROCEDURE — 6360000002 HC RX W HCPCS: Performed by: SURGERY

## 2019-11-08 PROCEDURE — 82962 GLUCOSE BLOOD TEST: CPT

## 2019-11-08 PROCEDURE — 36591 DRAW BLOOD OFF VENOUS DEVICE: CPT

## 2019-11-08 PROCEDURE — APPSS30 APP SPLIT SHARED TIME 16-30 MINUTES: Performed by: NURSE PRACTITIONER

## 2019-11-08 PROCEDURE — 1200000000 HC SEMI PRIVATE

## 2019-11-08 PROCEDURE — 2500000003 HC RX 250 WO HCPCS: Performed by: SURGERY

## 2019-11-08 PROCEDURE — 36415 COLL VENOUS BLD VENIPUNCTURE: CPT

## 2019-11-08 PROCEDURE — 2580000003 HC RX 258: Performed by: SURGERY

## 2019-11-08 PROCEDURE — 97535 SELF CARE MNGMENT TRAINING: CPT

## 2019-11-08 RX ORDER — SIMETHICONE 80 MG
80 TABLET,CHEWABLE ORAL
Status: DISCONTINUED | OUTPATIENT
Start: 2019-11-08 | End: 2019-11-09 | Stop reason: HOSPADM

## 2019-11-08 RX ORDER — SIMETHICONE 80 MG
80 TABLET,CHEWABLE ORAL EVERY 6 HOURS PRN
Status: DISCONTINUED | OUTPATIENT
Start: 2019-11-08 | End: 2019-11-08

## 2019-11-08 RX ORDER — OXYCODONE HYDROCHLORIDE AND ACETAMINOPHEN 5; 325 MG/1; MG/1
1 TABLET ORAL EVERY 4 HOURS PRN
Status: DISCONTINUED | OUTPATIENT
Start: 2019-11-08 | End: 2019-11-09 | Stop reason: HOSPADM

## 2019-11-08 RX ORDER — OXYCODONE HYDROCHLORIDE AND ACETAMINOPHEN 5; 325 MG/1; MG/1
2 TABLET ORAL EVERY 4 HOURS PRN
Status: DISCONTINUED | OUTPATIENT
Start: 2019-11-08 | End: 2019-11-09 | Stop reason: HOSPADM

## 2019-11-08 RX ADMIN — AMOXICILLIN AND CLAVULANATE POTASSIUM 1 TABLET: 500; 125 TABLET, FILM COATED ORAL at 22:18

## 2019-11-08 RX ADMIN — Medication 10 ML: at 22:27

## 2019-11-08 RX ADMIN — INSULIN LISPRO 4 UNITS: 100 INJECTION, SOLUTION INTRAVENOUS; SUBCUTANEOUS at 08:54

## 2019-11-08 RX ADMIN — Medication 10 ML: at 18:41

## 2019-11-08 RX ADMIN — CLONIDINE HYDROCHLORIDE 0.2 MG: 0.2 TABLET ORAL at 09:20

## 2019-11-08 RX ADMIN — AMITRIPTYLINE HYDROCHLORIDE 10 MG: 10 TABLET, FILM COATED ORAL at 22:18

## 2019-11-08 RX ADMIN — ONDANSETRON 4 MG: 2 INJECTION INTRAMUSCULAR; INTRAVENOUS at 14:49

## 2019-11-08 RX ADMIN — SIMETHICONE CHEW TAB 80 MG 80 MG: 80 TABLET ORAL at 18:22

## 2019-11-08 RX ADMIN — METOPROLOL TARTRATE 5 MG: 5 INJECTION, SOLUTION INTRAVENOUS at 07:27

## 2019-11-08 RX ADMIN — HYDROCODONE BITARTRATE AND ACETAMINOPHEN 2 TABLET: 5; 325 TABLET ORAL at 07:50

## 2019-11-08 RX ADMIN — OXYCODONE HYDROCHLORIDE AND ACETAMINOPHEN 2 TABLET: 5; 325 TABLET ORAL at 19:34

## 2019-11-08 RX ADMIN — OXYCODONE HYDROCHLORIDE AND ACETAMINOPHEN 2 TABLET: 5; 325 TABLET ORAL at 11:12

## 2019-11-08 RX ADMIN — Medication 100 UNITS: at 15:59

## 2019-11-08 RX ADMIN — FUROSEMIDE 40 MG: 10 INJECTION, SOLUTION INTRAMUSCULAR; INTRAVENOUS at 09:20

## 2019-11-08 RX ADMIN — SIMETHICONE CHEW TAB 80 MG 80 MG: 80 TABLET ORAL at 08:59

## 2019-11-08 RX ADMIN — SIMETHICONE CHEW TAB 80 MG 80 MG: 80 TABLET ORAL at 22:17

## 2019-11-08 RX ADMIN — ISOSORBIDE MONONITRATE 30 MG: 30 TABLET, EXTENDED RELEASE ORAL at 09:20

## 2019-11-08 RX ADMIN — AMOXICILLIN AND CLAVULANATE POTASSIUM 1 TABLET: 500; 125 TABLET, FILM COATED ORAL at 09:20

## 2019-11-08 RX ADMIN — INSULIN GLARGINE 6 UNITS: 100 INJECTION, SOLUTION SUBCUTANEOUS at 22:17

## 2019-11-08 RX ADMIN — Medication 10 ML: at 09:21

## 2019-11-08 RX ADMIN — INSULIN LISPRO 1 UNITS: 100 INJECTION, SOLUTION INTRAVENOUS; SUBCUTANEOUS at 22:16

## 2019-11-08 RX ADMIN — Medication 100 UNITS: at 09:20

## 2019-11-08 RX ADMIN — METOPROLOL TARTRATE 5 MG: 5 INJECTION, SOLUTION INTRAVENOUS at 15:58

## 2019-11-08 RX ADMIN — HYDROCODONE BITARTRATE AND ACETAMINOPHEN 2 TABLET: 5; 325 TABLET ORAL at 03:35

## 2019-11-08 RX ADMIN — FUROSEMIDE 40 MG: 10 INJECTION, SOLUTION INTRAMUSCULAR; INTRAVENOUS at 18:40

## 2019-11-08 RX ADMIN — Medication 100 UNITS: at 02:15

## 2019-11-08 RX ADMIN — PANTOPRAZOLE SODIUM 40 MG: 40 TABLET, DELAYED RELEASE ORAL at 05:46

## 2019-11-08 RX ADMIN — Medication 100 UNITS: at 18:41

## 2019-11-08 RX ADMIN — INSULIN LISPRO 4 UNITS: 100 INJECTION, SOLUTION INTRAVENOUS; SUBCUTANEOUS at 18:21

## 2019-11-08 RX ADMIN — CLONIDINE HYDROCHLORIDE 0.2 MG: 0.2 TABLET ORAL at 22:17

## 2019-11-08 RX ADMIN — Medication 10 ML: at 15:59

## 2019-11-08 RX ADMIN — AMLODIPINE BESYLATE 5 MG: 5 TABLET ORAL at 09:20

## 2019-11-08 RX ADMIN — OXYCODONE HYDROCHLORIDE AND ACETAMINOPHEN 2 TABLET: 5; 325 TABLET ORAL at 15:22

## 2019-11-08 RX ADMIN — METOPROLOL TARTRATE 5 MG: 5 INJECTION, SOLUTION INTRAVENOUS at 02:07

## 2019-11-08 RX ADMIN — INSULIN LISPRO 2 UNITS: 100 INJECTION, SOLUTION INTRAVENOUS; SUBCUTANEOUS at 12:05

## 2019-11-08 ASSESSMENT — PAIN DESCRIPTION - LOCATION
LOCATION: ABDOMEN;SHOULDER
LOCATION: ABDOMEN
LOCATION: ABDOMEN;SHOULDER
LOCATION: ABDOMEN

## 2019-11-08 ASSESSMENT — PAIN DESCRIPTION - DESCRIPTORS
DESCRIPTORS: ACHING;CONSTANT;DISCOMFORT
DESCRIPTORS: CONSTANT;SORE
DESCRIPTORS: CONSTANT;SORE
DESCRIPTORS: CONSTANT;DISCOMFORT;SORE

## 2019-11-08 ASSESSMENT — PAIN DESCRIPTION - ORIENTATION
ORIENTATION: MID

## 2019-11-08 ASSESSMENT — PAIN SCALES - GENERAL
PAINLEVEL_OUTOF10: 9
PAINLEVEL_OUTOF10: 8
PAINLEVEL_OUTOF10: 9
PAINLEVEL_OUTOF10: 7
PAINLEVEL_OUTOF10: 0
PAINLEVEL_OUTOF10: 4
PAINLEVEL_OUTOF10: 10
PAINLEVEL_OUTOF10: 0

## 2019-11-08 ASSESSMENT — PAIN DESCRIPTION - PAIN TYPE
TYPE: SURGICAL PAIN

## 2019-11-08 ASSESSMENT — PAIN DESCRIPTION - ONSET
ONSET: ON-GOING
ONSET: ON-GOING

## 2019-11-08 ASSESSMENT — PAIN DESCRIPTION - FREQUENCY: FREQUENCY: CONTINUOUS

## 2019-11-08 ASSESSMENT — PAIN DESCRIPTION - PROGRESSION: CLINICAL_PROGRESSION: GRADUALLY WORSENING

## 2019-11-09 VITALS
HEIGHT: 64 IN | TEMPERATURE: 98.1 F | SYSTOLIC BLOOD PRESSURE: 162 MMHG | HEART RATE: 88 BPM | WEIGHT: 160 LBS | DIASTOLIC BLOOD PRESSURE: 88 MMHG | RESPIRATION RATE: 16 BRPM | OXYGEN SATURATION: 95 % | BODY MASS INDEX: 27.31 KG/M2

## 2019-11-09 LAB
METER GLUCOSE: 130 MG/DL (ref 74–99)
METER GLUCOSE: 323 MG/DL (ref 74–99)

## 2019-11-09 PROCEDURE — 6370000000 HC RX 637 (ALT 250 FOR IP): Performed by: NURSE PRACTITIONER

## 2019-11-09 PROCEDURE — APPSS45 APP SPLIT SHARED TIME 31-45 MINUTES: Performed by: NURSE PRACTITIONER

## 2019-11-09 PROCEDURE — 6370000000 HC RX 637 (ALT 250 FOR IP): Performed by: SURGERY

## 2019-11-09 PROCEDURE — 99239 HOSP IP/OBS DSCHRG MGMT >30: CPT | Performed by: INTERNAL MEDICINE

## 2019-11-09 PROCEDURE — 6360000002 HC RX W HCPCS: Performed by: SURGERY

## 2019-11-09 PROCEDURE — 2700000000 HC OXYGEN THERAPY PER DAY

## 2019-11-09 PROCEDURE — 2580000003 HC RX 258: Performed by: SURGERY

## 2019-11-09 PROCEDURE — 82962 GLUCOSE BLOOD TEST: CPT

## 2019-11-09 RX ORDER — OXYCODONE HYDROCHLORIDE AND ACETAMINOPHEN 5; 325 MG/1; MG/1
1 TABLET ORAL EVERY 4 HOURS PRN
Qty: 12 TABLET | Refills: 0 | Status: SHIPPED | OUTPATIENT
Start: 2019-11-09 | End: 2019-11-12

## 2019-11-09 RX ORDER — SIMETHICONE 80 MG
80 TABLET,CHEWABLE ORAL EVERY 6 HOURS PRN
Qty: 30 TABLET | Refills: 0 | Status: ON HOLD | OUTPATIENT
Start: 2019-11-09 | End: 2020-01-25 | Stop reason: HOSPADM

## 2019-11-09 RX ORDER — AMLODIPINE BESYLATE 5 MG/1
5 TABLET ORAL DAILY
Qty: 30 TABLET | Refills: 0 | Status: ON HOLD | OUTPATIENT
Start: 2019-11-10 | End: 2020-01-25 | Stop reason: HOSPADM

## 2019-11-09 RX ORDER — ISOSORBIDE MONONITRATE 30 MG/1
30 TABLET, EXTENDED RELEASE ORAL DAILY
Qty: 30 TABLET | Refills: 0 | Status: ON HOLD | OUTPATIENT
Start: 2019-11-10 | End: 2020-01-20 | Stop reason: HOSPADM

## 2019-11-09 RX ORDER — FUROSEMIDE 40 MG/1
40 TABLET ORAL DAILY
Qty: 60 TABLET | Refills: 0 | Status: ON HOLD | OUTPATIENT
Start: 2019-11-09 | End: 2020-01-20 | Stop reason: HOSPADM

## 2019-11-09 RX ORDER — CLONIDINE HYDROCHLORIDE 0.2 MG/1
0.2 TABLET ORAL 3 TIMES DAILY
Qty: 60 TABLET | Refills: 0 | Status: ON HOLD | OUTPATIENT
Start: 2019-11-09 | End: 2020-01-25 | Stop reason: HOSPADM

## 2019-11-09 RX ADMIN — CLONIDINE HYDROCHLORIDE 0.2 MG: 0.2 TABLET ORAL at 08:34

## 2019-11-09 RX ADMIN — SIMETHICONE CHEW TAB 80 MG 80 MG: 80 TABLET ORAL at 05:35

## 2019-11-09 RX ADMIN — AMOXICILLIN AND CLAVULANATE POTASSIUM 1 TABLET: 500; 125 TABLET, FILM COATED ORAL at 08:34

## 2019-11-09 RX ADMIN — DIPHENOXYLATE HYDROCHLORIDE AND ATROPINE SULFATE 2 TABLET: 2.5; .025 TABLET ORAL at 09:46

## 2019-11-09 RX ADMIN — OXYCODONE HYDROCHLORIDE AND ACETAMINOPHEN 2 TABLET: 5; 325 TABLET ORAL at 07:04

## 2019-11-09 RX ADMIN — PANTOPRAZOLE SODIUM 40 MG: 40 TABLET, DELAYED RELEASE ORAL at 05:35

## 2019-11-09 RX ADMIN — FUROSEMIDE 40 MG: 10 INJECTION, SOLUTION INTRAMUSCULAR; INTRAVENOUS at 08:34

## 2019-11-09 RX ADMIN — ISOSORBIDE MONONITRATE 30 MG: 30 TABLET, EXTENDED RELEASE ORAL at 08:34

## 2019-11-09 RX ADMIN — Medication 10 ML: at 08:35

## 2019-11-09 RX ADMIN — AMLODIPINE BESYLATE 5 MG: 5 TABLET ORAL at 08:34

## 2019-11-09 ASSESSMENT — PAIN SCALES - GENERAL
PAINLEVEL_OUTOF10: 5
PAINLEVEL_OUTOF10: 8
PAINLEVEL_OUTOF10: 3

## 2019-11-09 ASSESSMENT — PAIN - FUNCTIONAL ASSESSMENT: PAIN_FUNCTIONAL_ASSESSMENT: PREVENTS OR INTERFERES SOME ACTIVE ACTIVITIES AND ADLS

## 2019-11-09 ASSESSMENT — PAIN DESCRIPTION - ONSET: ONSET: ON-GOING

## 2019-11-09 ASSESSMENT — PAIN DESCRIPTION - PAIN TYPE: TYPE: SURGICAL PAIN

## 2019-11-09 ASSESSMENT — PAIN DESCRIPTION - PROGRESSION: CLINICAL_PROGRESSION: GRADUALLY IMPROVING

## 2019-11-09 ASSESSMENT — PAIN DESCRIPTION - LOCATION: LOCATION: ABDOMEN

## 2019-11-09 ASSESSMENT — PAIN DESCRIPTION - DESCRIPTORS: DESCRIPTORS: ACHING;DISCOMFORT;SORE;TENDER

## 2019-11-09 ASSESSMENT — PAIN DESCRIPTION - ORIENTATION: ORIENTATION: MID

## 2019-11-09 ASSESSMENT — PAIN DESCRIPTION - FREQUENCY: FREQUENCY: CONTINUOUS

## 2019-11-19 ENCOUNTER — HOSPITAL ENCOUNTER (EMERGENCY)
Age: 29
Discharge: HOME | End: 2019-11-19
Attending: EMERGENCY MEDICINE
Payer: COMMERCIAL

## 2019-11-19 ENCOUNTER — APPOINTMENT (OUTPATIENT)
Dept: CT IMAGING | Age: 29
End: 2019-11-19
Payer: COMMERCIAL

## 2019-11-19 ENCOUNTER — TELEPHONE (OUTPATIENT)
Dept: OTHER | Facility: CLINIC | Age: 29
End: 2019-11-19

## 2019-11-19 VITALS
HEIGHT: 64 IN | WEIGHT: 161 LBS | SYSTOLIC BLOOD PRESSURE: 133 MMHG | HEART RATE: 81 BPM | BODY MASS INDEX: 27.49 KG/M2 | TEMPERATURE: 97.9 F | DIASTOLIC BLOOD PRESSURE: 92 MMHG | OXYGEN SATURATION: 99 % | RESPIRATION RATE: 11 BRPM

## 2019-11-19 DIAGNOSIS — E16.2 HYPOGLYCEMIA: Primary | ICD-10-CM

## 2019-11-19 DIAGNOSIS — R56.9 SEIZURE-LIKE ACTIVITY (HCC): ICD-10-CM

## 2019-11-19 LAB
ALBUMIN SERPL-MCNC: 2.7 G/DL (ref 3.5–5.2)
ALP BLD-CCNC: 121 U/L (ref 35–104)
ALT SERPL-CCNC: 6 U/L (ref 0–32)
ANION GAP SERPL CALCULATED.3IONS-SCNC: 10 MMOL/L (ref 7–16)
AST SERPL-CCNC: 15 U/L (ref 0–31)
BASOPHILS ABSOLUTE: 0.19 E9/L (ref 0–0.2)
BASOPHILS RELATIVE PERCENT: 2.2 % (ref 0–2)
BETA-HYDROXYBUTYRATE: 0.13 MMOL/L (ref 0.02–0.27)
BILIRUB SERPL-MCNC: <0.2 MG/DL (ref 0–1.2)
BUN BLDV-MCNC: 23 MG/DL (ref 6–20)
CALCIUM SERPL-MCNC: 8.5 MG/DL (ref 8.6–10.2)
CHLORIDE BLD-SCNC: 106 MMOL/L (ref 98–107)
CO2: 27 MMOL/L (ref 22–29)
CREAT SERPL-MCNC: 2.8 MG/DL (ref 0.5–1)
EOSINOPHILS ABSOLUTE: 0.44 E9/L (ref 0.05–0.5)
EOSINOPHILS RELATIVE PERCENT: 5.2 % (ref 0–6)
GFR AFRICAN AMERICAN: 24
GFR NON-AFRICAN AMERICAN: 24 ML/MIN/1.73
GLUCOSE BLD-MCNC: 50 MG/DL (ref 74–99)
HCT VFR BLD CALC: 30.3 % (ref 34–48)
HEMOGLOBIN: 9.7 G/DL (ref 11.5–15.5)
IMMATURE GRANULOCYTES #: 0.04 E9/L
IMMATURE GRANULOCYTES %: 0.5 % (ref 0–5)
LACTIC ACID: 0.6 MMOL/L (ref 0.5–2.2)
LIPASE: 7 U/L (ref 13–60)
LYMPHOCYTES ABSOLUTE: 2.18 E9/L (ref 1.5–4)
LYMPHOCYTES RELATIVE PERCENT: 25.7 % (ref 20–42)
MCH RBC QN AUTO: 29 PG (ref 26–35)
MCHC RBC AUTO-ENTMCNC: 32 % (ref 32–34.5)
MCV RBC AUTO: 90.4 FL (ref 80–99.9)
METER GLUCOSE: 196 MG/DL (ref 74–99)
METER GLUCOSE: 46 MG/DL (ref 74–99)
MONOCYTES ABSOLUTE: 0.47 E9/L (ref 0.1–0.95)
MONOCYTES RELATIVE PERCENT: 5.5 % (ref 2–12)
NEUTROPHILS ABSOLUTE: 5.15 E9/L (ref 1.8–7.3)
NEUTROPHILS RELATIVE PERCENT: 60.9 % (ref 43–80)
PDW BLD-RTO: 17.4 FL (ref 11.5–15)
PH VENOUS: 7.39 (ref 7.35–7.45)
PLATELET # BLD: 236 E9/L (ref 130–450)
PMV BLD AUTO: 10.5 FL (ref 7–12)
POTASSIUM SERPL-SCNC: 4.1 MMOL/L (ref 3.5–5)
RBC # BLD: 3.35 E12/L (ref 3.5–5.5)
SODIUM BLD-SCNC: 143 MMOL/L (ref 132–146)
TOTAL PROTEIN: 5.9 G/DL (ref 6.4–8.3)
WBC # BLD: 8.5 E9/L (ref 4.5–11.5)

## 2019-11-19 PROCEDURE — 85025 COMPLETE CBC W/AUTO DIFF WBC: CPT

## 2019-11-19 PROCEDURE — 82800 BLOOD PH: CPT

## 2019-11-19 PROCEDURE — 6360000002 HC RX W HCPCS: Performed by: EMERGENCY MEDICINE

## 2019-11-19 PROCEDURE — 36415 COLL VENOUS BLD VENIPUNCTURE: CPT

## 2019-11-19 PROCEDURE — 82962 GLUCOSE BLOOD TEST: CPT

## 2019-11-19 PROCEDURE — 80053 COMPREHEN METABOLIC PANEL: CPT

## 2019-11-19 PROCEDURE — 99285 EMERGENCY DEPT VISIT HI MDM: CPT

## 2019-11-19 PROCEDURE — 82010 KETONE BODYS QUAN: CPT

## 2019-11-19 PROCEDURE — 2580000003 HC RX 258: Performed by: EMERGENCY MEDICINE

## 2019-11-19 PROCEDURE — 96374 THER/PROPH/DIAG INJ IV PUSH: CPT

## 2019-11-19 PROCEDURE — 83690 ASSAY OF LIPASE: CPT

## 2019-11-19 PROCEDURE — 70450 CT HEAD/BRAIN W/O DYE: CPT

## 2019-11-19 PROCEDURE — 87040 BLOOD CULTURE FOR BACTERIA: CPT

## 2019-11-19 PROCEDURE — 96375 TX/PRO/DX INJ NEW DRUG ADDON: CPT

## 2019-11-19 PROCEDURE — 6370000000 HC RX 637 (ALT 250 FOR IP): Performed by: EMERGENCY MEDICINE

## 2019-11-19 PROCEDURE — 83605 ASSAY OF LACTIC ACID: CPT

## 2019-11-19 RX ORDER — HEPARIN SODIUM (PORCINE) LOCK FLUSH IV SOLN 100 UNIT/ML 100 UNIT/ML
100 SOLUTION INTRAVENOUS ONCE
Status: COMPLETED | OUTPATIENT
Start: 2019-11-19 | End: 2019-11-19

## 2019-11-19 RX ORDER — METOCLOPRAMIDE HYDROCHLORIDE 5 MG/ML
10 INJECTION INTRAMUSCULAR; INTRAVENOUS ONCE
Status: COMPLETED | OUTPATIENT
Start: 2019-11-19 | End: 2019-11-19

## 2019-11-19 RX ORDER — DEXTROSE MONOHYDRATE 25 G/50ML
50 INJECTION, SOLUTION INTRAVENOUS ONCE
Status: COMPLETED | OUTPATIENT
Start: 2019-11-19 | End: 2019-11-19

## 2019-11-19 RX ORDER — SODIUM CHLORIDE 0.9 % (FLUSH) 0.9 %
SYRINGE (ML) INJECTION
Status: DISCONTINUED
Start: 2019-11-19 | End: 2019-11-19 | Stop reason: HOSPADM

## 2019-11-19 RX ORDER — ACETAMINOPHEN 500 MG
1000 TABLET ORAL ONCE
Status: COMPLETED | OUTPATIENT
Start: 2019-11-19 | End: 2019-11-19

## 2019-11-19 RX ORDER — IBUPROFEN 800 MG/1
800 TABLET ORAL ONCE
Status: DISCONTINUED | OUTPATIENT
Start: 2019-11-19 | End: 2019-11-19

## 2019-11-19 RX ADMIN — ACETAMINOPHEN 1000 MG: 500 TABLET, COATED ORAL at 14:06

## 2019-11-19 RX ADMIN — METOCLOPRAMIDE 10 MG: 5 INJECTION, SOLUTION INTRAMUSCULAR; INTRAVENOUS at 14:00

## 2019-11-19 RX ADMIN — DEXTROSE MONOHYDRATE 50 ML: 500 INJECTION PARENTERAL at 11:47

## 2019-11-19 RX ADMIN — Medication 100 UNITS: at 15:53

## 2019-11-19 ASSESSMENT — ENCOUNTER SYMPTOMS
NAUSEA: 0
CHEST TIGHTNESS: 0
DIARRHEA: 0
ABDOMINAL PAIN: 1
SHORTNESS OF BREATH: 0
VOMITING: 0

## 2019-11-19 ASSESSMENT — PAIN DESCRIPTION - DESCRIPTORS
DESCRIPTORS: DISCOMFORT;HEADACHE
DESCRIPTORS: HEADACHE

## 2019-11-19 ASSESSMENT — PAIN DESCRIPTION - FREQUENCY: FREQUENCY: CONTINUOUS

## 2019-11-19 ASSESSMENT — PAIN SCALES - GENERAL: PAINLEVEL_OUTOF10: 10

## 2019-11-19 ASSESSMENT — PAIN DESCRIPTION - LOCATION: LOCATION: HEAD

## 2019-11-19 ASSESSMENT — PAIN DESCRIPTION - PAIN TYPE
TYPE: ACUTE PAIN
TYPE: ACUTE PAIN

## 2019-11-20 ENCOUNTER — TELEPHONE (OUTPATIENT)
Dept: OTHER | Facility: CLINIC | Age: 29
End: 2019-11-20

## 2019-11-24 LAB — BLOOD CULTURE, ROUTINE: NORMAL

## 2019-12-04 LAB
EKG ATRIAL RATE: 133 BPM
EKG P AXIS: 50 DEGREES
EKG P-R INTERVAL: 122 MS
EKG Q-T INTERVAL: 300 MS
EKG QRS DURATION: 74 MS
EKG QTC CALCULATION (BAZETT): 446 MS
EKG R AXIS: 53 DEGREES
EKG T AXIS: 77 DEGREES
EKG VENTRICULAR RATE: 133 BPM

## 2019-12-26 ENCOUNTER — HOSPITAL ENCOUNTER (EMERGENCY)
Age: 29
Discharge: LEFT BEFORE DISCHARGE | End: 2019-12-26
Attending: EMERGENCY MEDICINE
Payer: COMMERCIAL

## 2019-12-26 VITALS
BODY MASS INDEX: 22.06 KG/M2 | OXYGEN SATURATION: 98 % | WEIGHT: 129.19 LBS | RESPIRATION RATE: 16 BRPM | HEART RATE: 98 BPM | TEMPERATURE: 97.6 F | SYSTOLIC BLOOD PRESSURE: 96 MMHG | HEIGHT: 64 IN | DIASTOLIC BLOOD PRESSURE: 51 MMHG

## 2019-12-26 ASSESSMENT — PAIN DESCRIPTION - ONSET: ONSET: SUDDEN

## 2019-12-26 ASSESSMENT — PAIN DESCRIPTION - FREQUENCY: FREQUENCY: INTERMITTENT

## 2019-12-26 ASSESSMENT — PAIN DESCRIPTION - LOCATION: LOCATION: CHEST

## 2019-12-26 ASSESSMENT — PAIN SCALES - GENERAL: PAINLEVEL_OUTOF10: 8

## 2019-12-26 ASSESSMENT — PAIN DESCRIPTION - DESCRIPTORS: DESCRIPTORS: SHARP

## 2020-01-12 ENCOUNTER — HOSPITAL ENCOUNTER (INPATIENT)
Age: 30
LOS: 5 days | Discharge: HOME | DRG: 469 | End: 2020-01-20
Attending: EMERGENCY MEDICINE | Admitting: INTERNAL MEDICINE
Payer: COMMERCIAL

## 2020-01-12 LAB
CHP ED QC CHECK: YES
GLUCOSE BLD-MCNC: 106 MG/DL
HCG, URINE, POC: NEGATIVE
Lab: NORMAL
METER GLUCOSE: 106 MG/DL (ref 74–99)
NEGATIVE QC PASS/FAIL: NORMAL
POSITIVE QC PASS/FAIL: NORMAL

## 2020-01-12 PROCEDURE — 85378 FIBRIN DEGRADE SEMIQUANT: CPT

## 2020-01-12 PROCEDURE — 82800 BLOOD PH: CPT

## 2020-01-12 PROCEDURE — 87502 INFLUENZA DNA AMP PROBE: CPT

## 2020-01-12 PROCEDURE — 81001 URINALYSIS AUTO W/SCOPE: CPT

## 2020-01-12 PROCEDURE — 82962 GLUCOSE BLOOD TEST: CPT

## 2020-01-12 PROCEDURE — G0480 DRUG TEST DEF 1-7 CLASSES: HCPCS

## 2020-01-12 PROCEDURE — 83735 ASSAY OF MAGNESIUM: CPT

## 2020-01-12 PROCEDURE — 80307 DRUG TEST PRSMV CHEM ANLYZR: CPT

## 2020-01-12 PROCEDURE — 99285 EMERGENCY DEPT VISIT HI MDM: CPT

## 2020-01-12 PROCEDURE — 80053 COMPREHEN METABOLIC PANEL: CPT

## 2020-01-12 PROCEDURE — 36415 COLL VENOUS BLD VENIPUNCTURE: CPT

## 2020-01-12 PROCEDURE — 82010 KETONE BODYS QUAN: CPT

## 2020-01-12 PROCEDURE — 85025 COMPLETE CBC W/AUTO DIFF WBC: CPT

## 2020-01-12 PROCEDURE — 84484 ASSAY OF TROPONIN QUANT: CPT

## 2020-01-12 PROCEDURE — 94760 N-INVAS EAR/PLS OXIMETRY 1: CPT

## 2020-01-12 PROCEDURE — 93005 ELECTROCARDIOGRAM TRACING: CPT | Performed by: STUDENT IN AN ORGANIZED HEALTH CARE EDUCATION/TRAINING PROGRAM

## 2020-01-12 PROCEDURE — 83605 ASSAY OF LACTIC ACID: CPT

## 2020-01-12 RX ORDER — 0.9 % SODIUM CHLORIDE 0.9 %
1000 INTRAVENOUS SOLUTION INTRAVENOUS ONCE
Status: COMPLETED | OUTPATIENT
Start: 2020-01-12 | End: 2020-01-13

## 2020-01-12 ASSESSMENT — PAIN DESCRIPTION - PAIN TYPE: TYPE: ACUTE PAIN

## 2020-01-12 ASSESSMENT — PAIN DESCRIPTION - LOCATION: LOCATION: GENERALIZED

## 2020-01-12 ASSESSMENT — PAIN DESCRIPTION - DESCRIPTORS: DESCRIPTORS: ACHING

## 2020-01-12 ASSESSMENT — PAIN DESCRIPTION - FREQUENCY: FREQUENCY: CONTINUOUS

## 2020-01-13 ENCOUNTER — APPOINTMENT (OUTPATIENT)
Dept: GENERAL RADIOLOGY | Age: 30
DRG: 469 | End: 2020-01-13
Payer: COMMERCIAL

## 2020-01-13 ENCOUNTER — APPOINTMENT (OUTPATIENT)
Dept: NUCLEAR MEDICINE | Age: 30
DRG: 469 | End: 2020-01-13
Payer: COMMERCIAL

## 2020-01-13 ENCOUNTER — APPOINTMENT (OUTPATIENT)
Dept: ULTRASOUND IMAGING | Age: 30
DRG: 469 | End: 2020-01-13
Payer: COMMERCIAL

## 2020-01-13 PROBLEM — R07.9 CHEST PAIN, UNSPECIFIED: Status: ACTIVE | Noted: 2020-01-13

## 2020-01-13 PROBLEM — N18.9 ACUTE KIDNEY INJURY SUPERIMPOSED ON CHRONIC KIDNEY DISEASE (HCC): Status: ACTIVE | Noted: 2019-10-01

## 2020-01-13 LAB
ACETAMINOPHEN LEVEL: <5 MCG/ML (ref 10–30)
ADENOVIRUS BY PCR: NOT DETECTED
ALBUMIN SERPL-MCNC: 3.2 G/DL (ref 3.5–5.2)
ALP BLD-CCNC: 166 U/L (ref 35–104)
ALT SERPL-CCNC: 11 U/L (ref 0–32)
AMPHETAMINE SCREEN, URINE: NOT DETECTED
ANION GAP SERPL CALCULATED.3IONS-SCNC: 12 MMOL/L (ref 7–16)
ANION GAP SERPL CALCULATED.3IONS-SCNC: 17 MMOL/L (ref 7–16)
AST SERPL-CCNC: 14 U/L (ref 0–31)
BACTERIA: ABNORMAL /HPF
BARBITURATE SCREEN URINE: NOT DETECTED
BASOPHILS ABSOLUTE: 0.11 E9/L (ref 0–0.2)
BASOPHILS RELATIVE PERCENT: 0.9 % (ref 0–2)
BENZODIAZEPINE SCREEN, URINE: NOT DETECTED
BETA-HYDROXYBUTYRATE: 1.24 MMOL/L (ref 0.02–0.27)
BILIRUB SERPL-MCNC: <0.2 MG/DL (ref 0–1.2)
BILIRUBIN URINE: NEGATIVE
BLOOD, URINE: ABNORMAL
BORDETELLA PARAPERTUSSIS BY PCR: NOT DETECTED
BORDETELLA PERTUSSIS BY PCR: NOT DETECTED
BUN BLDV-MCNC: 36 MG/DL (ref 6–20)
BUN BLDV-MCNC: 42 MG/DL (ref 6–20)
CALCIUM SERPL-MCNC: 8 MG/DL (ref 8.6–10.2)
CALCIUM SERPL-MCNC: 9.6 MG/DL (ref 8.6–10.2)
CANNABINOID SCREEN URINE: NOT DETECTED
CASTS: ABNORMAL /LPF
CHLAMYDOPHILIA PNEUMONIAE BY PCR: NOT DETECTED
CHLORIDE BLD-SCNC: 101 MMOL/L (ref 98–107)
CHLORIDE BLD-SCNC: 107 MMOL/L (ref 98–107)
CLARITY: CLEAR
CO2: 18 MMOL/L (ref 22–29)
CO2: 24 MMOL/L (ref 22–29)
CO2: 27 MMOL/L (ref 22–29)
COCAINE METABOLITE SCREEN URINE: NOT DETECTED
COLOR: YELLOW
CORONAVIRUS 229E BY PCR: NOT DETECTED
CORONAVIRUS HKU1 BY PCR: NOT DETECTED
CORONAVIRUS NL63 BY PCR: NOT DETECTED
CORONAVIRUS OC43 BY PCR: NOT DETECTED
CORTISOL TOTAL: 16.33 MCG/DL (ref 2.68–18.4)
CREAT SERPL-MCNC: 2.6 MG/DL (ref 0.5–1)
CREAT SERPL-MCNC: 3.2 MG/DL (ref 0.5–1)
D DIMER: 281 NG/ML DDU
EOSINOPHILS ABSOLUTE: 0.22 E9/L (ref 0.05–0.5)
EOSINOPHILS RELATIVE PERCENT: 1.8 % (ref 0–6)
EPITHELIAL CELLS, UA: ABNORMAL /HPF
ETHANOL: <10 MG/DL (ref 0–0.08)
FENTANYL SCREEN, URINE: NOT DETECTED
GFR AFRICAN AMERICAN: 20
GFR AFRICAN AMERICAN: 21
GFR AFRICAN AMERICAN: 27
GFR NON-AFRICAN AMERICAN: 17 ML/MIN/1.73
GFR NON-AFRICAN AMERICAN: 21 ML/MIN/1.73
GFR NON-AFRICAN AMERICAN: 27 ML/MIN/1.73
GLUCOSE BLD-MCNC: 111 MG/DL (ref 74–99)
GLUCOSE BLD-MCNC: 121 MG/DL (ref 74–99)
GLUCOSE BLD-MCNC: 313 MG/DL (ref 74–99)
GLUCOSE URINE: >=1000 MG/DL
HBA1C MFR BLD: 9.8 % (ref 4–5.6)
HCT VFR BLD CALC: 30.3 % (ref 34–48)
HCT VFR BLD CALC: 32.2 % (ref 34–48)
HEMOGLOBIN: 10.9 G/DL (ref 11.5–15.5)
HEMOGLOBIN: 9.6 G/DL (ref 11.5–15.5)
HUMAN METAPNEUMOVIRUS BY PCR: NOT DETECTED
HUMAN RHINOVIRUS/ENTEROVIRUS BY PCR: NOT DETECTED
IMMATURE GRANULOCYTES #: 0.05 E9/L
IMMATURE GRANULOCYTES %: 0.4 % (ref 0–5)
INFLUENZA A BY PCR: NOT DETECTED
INFLUENZA A BY PCR: NOT DETECTED
INFLUENZA B BY PCR: NOT DETECTED
INFLUENZA B BY PCR: NOT DETECTED
KETONES, URINE: ABNORMAL MG/DL
LACTIC ACID: 0.8 MMOL/L (ref 0.5–2.2)
LEUKOCYTE ESTERASE, URINE: NEGATIVE
LYMPHOCYTES ABSOLUTE: 2.37 E9/L (ref 1.5–4)
LYMPHOCYTES RELATIVE PERCENT: 19.5 % (ref 20–42)
Lab: ABNORMAL
MAGNESIUM: 1.9 MG/DL (ref 1.6–2.6)
MCH RBC QN AUTO: 28.5 PG (ref 26–35)
MCH RBC QN AUTO: 28.9 PG (ref 26–35)
MCHC RBC AUTO-ENTMCNC: 31.7 % (ref 32–34.5)
MCHC RBC AUTO-ENTMCNC: 33.9 % (ref 32–34.5)
MCV RBC AUTO: 85.4 FL (ref 80–99.9)
MCV RBC AUTO: 89.9 FL (ref 80–99.9)
METER GLUCOSE: 263 MG/DL (ref 74–99)
METER GLUCOSE: 270 MG/DL (ref 74–99)
METER GLUCOSE: 294 MG/DL (ref 74–99)
METER GLUCOSE: 389 MG/DL (ref 74–99)
METHADONE SCREEN, URINE: NOT DETECTED
MONOCYTES ABSOLUTE: 0.77 E9/L (ref 0.1–0.95)
MONOCYTES RELATIVE PERCENT: 6.3 % (ref 2–12)
MYCOPLASMA PNEUMONIAE BY PCR: NOT DETECTED
NEUTROPHILS ABSOLUTE: 8.62 E9/L (ref 1.8–7.3)
NEUTROPHILS RELATIVE PERCENT: 71.1 % (ref 43–80)
NITRITE, URINE: NEGATIVE
OPIATE SCREEN URINE: NOT DETECTED
OXYCODONE URINE: POSITIVE
PARAINFLUENZA VIRUS 1 BY PCR: NOT DETECTED
PARAINFLUENZA VIRUS 2 BY PCR: NOT DETECTED
PARAINFLUENZA VIRUS 3 BY PCR: NOT DETECTED
PARAINFLUENZA VIRUS 4 BY PCR: NOT DETECTED
PDW BLD-RTO: 14.6 FL (ref 11.5–15)
PDW BLD-RTO: 14.8 FL (ref 11.5–15)
PH UA: 8.5 (ref 5–9)
PH VENOUS: 7.29 (ref 7.35–7.45)
PHENCYCLIDINE SCREEN URINE: NOT DETECTED
PLATELET # BLD: 282 E9/L (ref 130–450)
PLATELET # BLD: 285 E9/L (ref 130–450)
PMV BLD AUTO: 10.1 FL (ref 7–12)
PMV BLD AUTO: 11.6 FL (ref 7–12)
POC ANION GAP: 7 MMOL/L (ref 7–16)
POC BUN: 37 MG/DL (ref 8–23)
POC CHLORIDE: 105 MMOL/L (ref 100–108)
POC CREATININE: 3.3 MG/DL (ref 0.5–1)
POC POTASSIUM: 3.4 MMOL/L (ref 3.5–5)
POC SODIUM: 139 MMOL/L (ref 132–146)
POTASSIUM REFLEX MAGNESIUM: 3.5 MMOL/L (ref 3.5–5)
POTASSIUM REFLEX MAGNESIUM: 4 MMOL/L (ref 3.5–5)
PRO-BNP: 988 PG/ML (ref 0–125)
PROCALCITONIN: 0.05 NG/ML (ref 0–0.08)
PROTEIN UA: >=300 MG/DL
RBC # BLD: 3.37 E12/L (ref 3.5–5.5)
RBC # BLD: 3.77 E12/L (ref 3.5–5.5)
RBC UA: ABNORMAL /HPF (ref 0–2)
RESPIRATORY SYNCYTIAL VIRUS BY PCR: NOT DETECTED
SALICYLATE, SERUM: <0.3 MG/DL (ref 0–30)
SODIUM BLD-SCNC: 137 MMOL/L (ref 132–146)
SODIUM BLD-SCNC: 142 MMOL/L (ref 132–146)
SPECIFIC GRAVITY UA: 1.01 (ref 1–1.03)
T4 FREE: 1.19 NG/DL (ref 0.93–1.7)
TOTAL PROTEIN: 7.4 G/DL (ref 6.4–8.3)
TRICYCLIC ANTIDEPRESSANTS SCREEN SERUM: NEGATIVE NG/ML
TROPONIN: 0.01 NG/ML (ref 0–0.03)
TROPONIN: 0.02 NG/ML (ref 0–0.03)
TROPONIN: <0.01 NG/ML (ref 0–0.03)
TSH SERPL DL<=0.05 MIU/L-ACNC: 0.35 UIU/ML (ref 0.27–4.2)
UROBILINOGEN, URINE: 0.2 E.U./DL
VITAMIN D 25-HYDROXY: <5 NG/ML (ref 30–100)
WBC # BLD: 10.9 E9/L (ref 4.5–11.5)
WBC # BLD: 12.1 E9/L (ref 4.5–11.5)
WBC UA: ABNORMAL /HPF (ref 0–5)

## 2020-01-13 PROCEDURE — G0378 HOSPITAL OBSERVATION PER HR: HCPCS

## 2020-01-13 PROCEDURE — 6370000000 HC RX 637 (ALT 250 FOR IP): Performed by: NURSE PRACTITIONER

## 2020-01-13 PROCEDURE — 96375 TX/PRO/DX INJ NEW DRUG ADDON: CPT

## 2020-01-13 PROCEDURE — 82962 GLUCOSE BLOOD TEST: CPT

## 2020-01-13 PROCEDURE — 0100U HC RESPIRPTHGN MULT REV TRANS & AMP PRB TECH 21 TRGT: CPT

## 2020-01-13 PROCEDURE — 78582 LUNG VENTILAT&PERFUS IMAGING: CPT

## 2020-01-13 PROCEDURE — 71045 X-RAY EXAM CHEST 1 VIEW: CPT

## 2020-01-13 PROCEDURE — 83036 HEMOGLOBIN GLYCOSYLATED A1C: CPT

## 2020-01-13 PROCEDURE — 36415 COLL VENOUS BLD VENIPUNCTURE: CPT

## 2020-01-13 PROCEDURE — 85027 COMPLETE CBC AUTOMATED: CPT

## 2020-01-13 PROCEDURE — 93306 TTE W/DOPPLER COMPLETE: CPT

## 2020-01-13 PROCEDURE — 93970 EXTREMITY STUDY: CPT

## 2020-01-13 PROCEDURE — APPSS30 APP SPLIT SHARED TIME 16-30 MINUTES: Performed by: NURSE PRACTITIONER

## 2020-01-13 PROCEDURE — 96360 HYDRATION IV INFUSION INIT: CPT

## 2020-01-13 PROCEDURE — 71046 X-RAY EXAM CHEST 2 VIEWS: CPT

## 2020-01-13 PROCEDURE — 82947 ASSAY GLUCOSE BLOOD QUANT: CPT

## 2020-01-13 PROCEDURE — 82533 TOTAL CORTISOL: CPT

## 2020-01-13 PROCEDURE — 80051 ELECTROLYTE PANEL: CPT

## 2020-01-13 PROCEDURE — 96361 HYDRATE IV INFUSION ADD-ON: CPT

## 2020-01-13 PROCEDURE — 99220 PR INITIAL OBSERVATION CARE/DAY 70 MINUTES: CPT | Performed by: NURSE PRACTITIONER

## 2020-01-13 PROCEDURE — 3430000000 HC RX DIAGNOSTIC RADIOPHARMACEUTICAL: Performed by: RADIOLOGY

## 2020-01-13 PROCEDURE — 84145 PROCALCITONIN (PCT): CPT

## 2020-01-13 PROCEDURE — APPSS60 APP SPLIT SHARED TIME 46-60 MINUTES: Performed by: PHYSICIAN ASSISTANT

## 2020-01-13 PROCEDURE — 82306 VITAMIN D 25 HYDROXY: CPT

## 2020-01-13 PROCEDURE — 84439 ASSAY OF FREE THYROXINE: CPT

## 2020-01-13 PROCEDURE — 2580000003 HC RX 258: Performed by: INTERNAL MEDICINE

## 2020-01-13 PROCEDURE — 2580000003 HC RX 258: Performed by: STUDENT IN AN ORGANIZED HEALTH CARE EDUCATION/TRAINING PROGRAM

## 2020-01-13 PROCEDURE — A9539 TC99M PENTETATE: HCPCS | Performed by: RADIOLOGY

## 2020-01-13 PROCEDURE — 2580000003 HC RX 258: Performed by: NURSE PRACTITIONER

## 2020-01-13 PROCEDURE — 80048 BASIC METABOLIC PNL TOTAL CA: CPT

## 2020-01-13 PROCEDURE — 36591 DRAW BLOOD OFF VENOUS DEVICE: CPT

## 2020-01-13 PROCEDURE — 83880 ASSAY OF NATRIURETIC PEPTIDE: CPT

## 2020-01-13 PROCEDURE — 6370000000 HC RX 637 (ALT 250 FOR IP): Performed by: INTERNAL MEDICINE

## 2020-01-13 PROCEDURE — 84443 ASSAY THYROID STIM HORMONE: CPT

## 2020-01-13 PROCEDURE — 2580000003 HC RX 258: Performed by: EMERGENCY MEDICINE

## 2020-01-13 PROCEDURE — 2500000003 HC RX 250 WO HCPCS: Performed by: INTERNAL MEDICINE

## 2020-01-13 PROCEDURE — A9540 TC99M MAA: HCPCS | Performed by: RADIOLOGY

## 2020-01-13 PROCEDURE — 84484 ASSAY OF TROPONIN QUANT: CPT

## 2020-01-13 PROCEDURE — 99226 PR SBSQ OBSERVATION CARE/DAY 35 MINUTES: CPT | Performed by: INTERNAL MEDICINE

## 2020-01-13 PROCEDURE — 99245 OFF/OP CONSLTJ NEW/EST HI 55: CPT | Performed by: INTERNAL MEDICINE

## 2020-01-13 PROCEDURE — 96376 TX/PRO/DX INJ SAME DRUG ADON: CPT

## 2020-01-13 PROCEDURE — 84520 ASSAY OF UREA NITROGEN: CPT

## 2020-01-13 PROCEDURE — 6360000002 HC RX W HCPCS: Performed by: NURSE PRACTITIONER

## 2020-01-13 PROCEDURE — 82565 ASSAY OF CREATININE: CPT

## 2020-01-13 RX ORDER — DEXTROSE MONOHYDRATE 25 G/50ML
12.5 INJECTION, SOLUTION INTRAVENOUS PRN
Status: DISCONTINUED | OUTPATIENT
Start: 2020-01-13 | End: 2020-01-13 | Stop reason: SDUPTHER

## 2020-01-13 RX ORDER — SODIUM CHLORIDE 0.9 % (FLUSH) 0.9 %
10 SYRINGE (ML) INJECTION EVERY 12 HOURS SCHEDULED
Status: DISCONTINUED | OUTPATIENT
Start: 2020-01-13 | End: 2020-01-20 | Stop reason: HOSPADM

## 2020-01-13 RX ORDER — NITROGLYCERIN 0.4 MG/1
0.4 TABLET SUBLINGUAL EVERY 5 MIN PRN
Status: DISCONTINUED | OUTPATIENT
Start: 2020-01-13 | End: 2020-01-20 | Stop reason: HOSPADM

## 2020-01-13 RX ORDER — ASPIRIN 81 MG/1
81 TABLET, CHEWABLE ORAL DAILY
Status: DISCONTINUED | OUTPATIENT
Start: 2020-01-14 | End: 2020-01-20 | Stop reason: HOSPADM

## 2020-01-13 RX ORDER — NICOTINE POLACRILEX 4 MG
15 LOZENGE BUCCAL PRN
Status: DISCONTINUED | OUTPATIENT
Start: 2020-01-13 | End: 2020-01-20 | Stop reason: HOSPADM

## 2020-01-13 RX ORDER — ATORVASTATIN CALCIUM 40 MG/1
40 TABLET, FILM COATED ORAL NIGHTLY
Status: DISCONTINUED | OUTPATIENT
Start: 2020-01-13 | End: 2020-01-20 | Stop reason: HOSPADM

## 2020-01-13 RX ORDER — KIT FOR THE PREPARATION OF TECHNETIUM TC 99M PENTETATE 20 MG/1
800 INJECTION, POWDER, LYOPHILIZED, FOR SOLUTION INTRAVENOUS; RESPIRATORY (INHALATION)
Status: COMPLETED | OUTPATIENT
Start: 2020-01-13 | End: 2020-01-13

## 2020-01-13 RX ORDER — CALCIUM CARBONATE 200(500)MG
500 TABLET,CHEWABLE ORAL 3 TIMES DAILY PRN
Status: DISCONTINUED | OUTPATIENT
Start: 2020-01-13 | End: 2020-01-20 | Stop reason: HOSPADM

## 2020-01-13 RX ORDER — AMITRIPTYLINE HYDROCHLORIDE 10 MG/1
10 TABLET, FILM COATED ORAL NIGHTLY
Status: DISCONTINUED | OUTPATIENT
Start: 2020-01-13 | End: 2020-01-20 | Stop reason: HOSPADM

## 2020-01-13 RX ORDER — DEXTROSE MONOHYDRATE 25 G/50ML
12.5 INJECTION, SOLUTION INTRAVENOUS PRN
Status: DISCONTINUED | OUTPATIENT
Start: 2020-01-13 | End: 2020-01-20 | Stop reason: HOSPADM

## 2020-01-13 RX ORDER — NICOTINE POLACRILEX 4 MG
15 LOZENGE BUCCAL PRN
Status: DISCONTINUED | OUTPATIENT
Start: 2020-01-13 | End: 2020-01-13 | Stop reason: SDUPTHER

## 2020-01-13 RX ORDER — INSULIN GLARGINE 100 [IU]/ML
6 INJECTION, SOLUTION SUBCUTANEOUS NIGHTLY
Status: DISCONTINUED | OUTPATIENT
Start: 2020-01-13 | End: 2020-01-20 | Stop reason: HOSPADM

## 2020-01-13 RX ORDER — FUROSEMIDE 40 MG/1
40 TABLET ORAL DAILY
Status: DISCONTINUED | OUTPATIENT
Start: 2020-01-13 | End: 2020-01-13

## 2020-01-13 RX ORDER — METOPROLOL TARTRATE 50 MG/1
100 TABLET, FILM COATED ORAL 2 TIMES DAILY
Status: DISCONTINUED | OUTPATIENT
Start: 2020-01-13 | End: 2020-01-13

## 2020-01-13 RX ORDER — ERGOCALCIFEROL 1.25 MG/1
50000 CAPSULE ORAL WEEKLY
Status: DISCONTINUED | OUTPATIENT
Start: 2020-01-13 | End: 2020-01-13

## 2020-01-13 RX ORDER — ERGOCALCIFEROL 1.25 MG/1
50000 CAPSULE ORAL
Status: DISCONTINUED | OUTPATIENT
Start: 2020-01-16 | End: 2020-01-20 | Stop reason: HOSPADM

## 2020-01-13 RX ORDER — CLONIDINE HYDROCHLORIDE 0.2 MG/1
0.2 TABLET ORAL 3 TIMES DAILY
Status: DISCONTINUED | OUTPATIENT
Start: 2020-01-13 | End: 2020-01-20 | Stop reason: HOSPADM

## 2020-01-13 RX ORDER — SODIUM CHLORIDE 450 MG/100ML
INJECTION, SOLUTION INTRAVENOUS CONTINUOUS
Status: DISCONTINUED | OUTPATIENT
Start: 2020-01-13 | End: 2020-01-13

## 2020-01-13 RX ORDER — ONDANSETRON 2 MG/ML
4 INJECTION INTRAMUSCULAR; INTRAVENOUS EVERY 6 HOURS PRN
Status: DISCONTINUED | OUTPATIENT
Start: 2020-01-13 | End: 2020-01-20 | Stop reason: HOSPADM

## 2020-01-13 RX ORDER — SODIUM CHLORIDE 9 MG/ML
INJECTION, SOLUTION INTRAVENOUS CONTINUOUS
Status: DISCONTINUED | OUTPATIENT
Start: 2020-01-13 | End: 2020-01-13

## 2020-01-13 RX ORDER — SODIUM CHLORIDE 0.9 % (FLUSH) 0.9 %
10 SYRINGE (ML) INJECTION PRN
Status: DISCONTINUED | OUTPATIENT
Start: 2020-01-13 | End: 2020-01-20 | Stop reason: HOSPADM

## 2020-01-13 RX ORDER — DEXTROSE MONOHYDRATE 50 MG/ML
100 INJECTION, SOLUTION INTRAVENOUS PRN
Status: DISCONTINUED | OUTPATIENT
Start: 2020-01-13 | End: 2020-01-20 | Stop reason: HOSPADM

## 2020-01-13 RX ORDER — SODIUM CHLORIDE 9 MG/ML
INJECTION, SOLUTION INTRAVENOUS ONCE
Status: DISCONTINUED | OUTPATIENT
Start: 2020-01-13 | End: 2020-01-18

## 2020-01-13 RX ORDER — PANTOPRAZOLE SODIUM 40 MG/1
40 TABLET, DELAYED RELEASE ORAL
Status: DISCONTINUED | OUTPATIENT
Start: 2020-01-14 | End: 2020-01-13

## 2020-01-13 RX ORDER — CLONIDINE HYDROCHLORIDE 0.2 MG/1
0.2 TABLET ORAL 3 TIMES DAILY
Status: DISCONTINUED | OUTPATIENT
Start: 2020-01-13 | End: 2020-01-13

## 2020-01-13 RX ORDER — METOPROLOL TARTRATE 50 MG/1
100 TABLET, FILM COATED ORAL 2 TIMES DAILY
Status: DISCONTINUED | OUTPATIENT
Start: 2020-01-13 | End: 2020-01-20 | Stop reason: HOSPADM

## 2020-01-13 RX ORDER — DEXTROSE MONOHYDRATE 50 MG/ML
100 INJECTION, SOLUTION INTRAVENOUS PRN
Status: DISCONTINUED | OUTPATIENT
Start: 2020-01-13 | End: 2020-01-13 | Stop reason: SDUPTHER

## 2020-01-13 RX ORDER — ISOSORBIDE MONONITRATE 30 MG/1
30 TABLET, EXTENDED RELEASE ORAL DAILY
Status: DISCONTINUED | OUTPATIENT
Start: 2020-01-13 | End: 2020-01-20 | Stop reason: HOSPADM

## 2020-01-13 RX ORDER — LORAZEPAM 0.5 MG/1
0.5 TABLET ORAL 2 TIMES DAILY PRN
Status: DISCONTINUED | OUTPATIENT
Start: 2020-01-13 | End: 2020-01-18

## 2020-01-13 RX ORDER — 0.9 % SODIUM CHLORIDE 0.9 %
1000 INTRAVENOUS SOLUTION INTRAVENOUS ONCE
Status: COMPLETED | OUTPATIENT
Start: 2020-01-13 | End: 2020-01-13

## 2020-01-13 RX ORDER — PANTOPRAZOLE SODIUM 40 MG/1
40 TABLET, DELAYED RELEASE ORAL
Status: DISCONTINUED | OUTPATIENT
Start: 2020-01-13 | End: 2020-01-20 | Stop reason: HOSPADM

## 2020-01-13 RX ORDER — AMLODIPINE BESYLATE 5 MG/1
5 TABLET ORAL DAILY
Status: DISCONTINUED | OUTPATIENT
Start: 2020-01-13 | End: 2020-01-20 | Stop reason: HOSPADM

## 2020-01-13 RX ORDER — HEPARIN SODIUM 5000 [USP'U]/ML
5000 INJECTION, SOLUTION INTRAVENOUS; SUBCUTANEOUS EVERY 8 HOURS SCHEDULED
Status: DISCONTINUED | OUTPATIENT
Start: 2020-01-13 | End: 2020-01-17

## 2020-01-13 RX ADMIN — ATORVASTATIN CALCIUM 40 MG: 40 TABLET, FILM COATED ORAL at 22:08

## 2020-01-13 RX ADMIN — PANTOPRAZOLE SODIUM 40 MG: 40 TABLET, DELAYED RELEASE ORAL at 16:42

## 2020-01-13 RX ADMIN — SODIUM CHLORIDE 1000 ML: 9 INJECTION, SOLUTION INTRAVENOUS at 03:51

## 2020-01-13 RX ADMIN — CLONIDINE HYDROCHLORIDE 0.2 MG: 0.2 TABLET ORAL at 16:42

## 2020-01-13 RX ADMIN — CLONIDINE HYDROCHLORIDE 0.2 MG: 0.2 TABLET ORAL at 22:08

## 2020-01-13 RX ADMIN — KIT FOR THE PREPARATION OF TECHNETIUM TC 99M PENTETATE 800 MICRO CURIE: 20 INJECTION, POWDER, LYOPHILIZED, FOR SOLUTION INTRAVENOUS; RESPIRATORY (INHALATION) at 13:52

## 2020-01-13 RX ADMIN — Medication 6 MILLICURIE: at 13:52

## 2020-01-13 RX ADMIN — SODIUM CHLORIDE: 9 INJECTION, SOLUTION INTRAVENOUS at 06:02

## 2020-01-13 RX ADMIN — INSULIN LISPRO 3 UNITS: 100 INJECTION, SOLUTION INTRAVENOUS; SUBCUTANEOUS at 11:48

## 2020-01-13 RX ADMIN — CALCIUM ACETATE 667 MG: 667 CAPSULE ORAL at 16:42

## 2020-01-13 RX ADMIN — LORAZEPAM 0.5 MG: 0.5 TABLET ORAL at 22:08

## 2020-01-13 RX ADMIN — CALCIUM CARBONATE (ANTACID) CHEW TAB 500 MG 500 MG: 500 CHEW TAB at 22:08

## 2020-01-13 RX ADMIN — AMITRIPTYLINE HYDROCHLORIDE 10 MG: 10 TABLET, FILM COATED ORAL at 22:08

## 2020-01-13 RX ADMIN — ERGOCALCIFEROL 50000 UNITS: 1.25 CAPSULE ORAL at 17:43

## 2020-01-13 RX ADMIN — ONDANSETRON 4 MG: 2 INJECTION INTRAMUSCULAR; INTRAVENOUS at 06:06

## 2020-01-13 RX ADMIN — ONDANSETRON 4 MG: 2 INJECTION INTRAMUSCULAR; INTRAVENOUS at 16:01

## 2020-01-13 RX ADMIN — INSULIN LISPRO 3 UNITS: 100 INJECTION, SOLUTION INTRAVENOUS; SUBCUTANEOUS at 18:07

## 2020-01-13 RX ADMIN — INSULIN GLARGINE 6 UNITS: 100 INJECTION, SOLUTION SUBCUTANEOUS at 22:07

## 2020-01-13 RX ADMIN — METOPROLOL TARTRATE 100 MG: 50 TABLET, FILM COATED ORAL at 16:42

## 2020-01-13 RX ADMIN — INSULIN LISPRO 2 UNITS: 100 INJECTION, SOLUTION INTRAVENOUS; SUBCUTANEOUS at 22:07

## 2020-01-13 RX ADMIN — SODIUM CHLORIDE 1000 ML: 9 INJECTION, SOLUTION INTRAVENOUS at 00:04

## 2020-01-13 RX ADMIN — SODIUM BICARBONATE: 84 INJECTION, SOLUTION INTRAVENOUS at 22:08

## 2020-01-13 RX ADMIN — SODIUM CHLORIDE: 4.5 INJECTION, SOLUTION INTRAVENOUS at 15:25

## 2020-01-13 ASSESSMENT — ENCOUNTER SYMPTOMS
WHEEZING: 0
NAUSEA: 0
ABDOMINAL PAIN: 0
COUGH: 1
VOMITING: 0
CHEST TIGHTNESS: 0
ABDOMINAL DISTENTION: 0
PHOTOPHOBIA: 0
DIARRHEA: 0
SHORTNESS OF BREATH: 1

## 2020-01-13 ASSESSMENT — PAIN DESCRIPTION - DESCRIPTORS: DESCRIPTORS: ACHING;DISCOMFORT;CRAMPING

## 2020-01-13 ASSESSMENT — PAIN DESCRIPTION - ONSET: ONSET: ON-GOING

## 2020-01-13 ASSESSMENT — PAIN SCALES - GENERAL
PAINLEVEL_OUTOF10: 0
PAINLEVEL_OUTOF10: 8
PAINLEVEL_OUTOF10: 0

## 2020-01-13 ASSESSMENT — PAIN DESCRIPTION - LOCATION: LOCATION: ABDOMEN

## 2020-01-13 ASSESSMENT — PAIN DESCRIPTION - PAIN TYPE: TYPE: ACUTE PAIN

## 2020-01-13 ASSESSMENT — PAIN DESCRIPTION - FREQUENCY: FREQUENCY: INTERMITTENT

## 2020-01-13 NOTE — H&P
[address] [place], [place] [address]ist Group   History and Physical      CHIEF COMPLAINT:  CP/SOB with exertion    History of Present Illness:  34 y.o. female with a history of CKD, Cephalgia, Depression, DM I, and Iron deficiency anemia presents with CP and SOB with exertion. Pt complaint is mild in severity, intermittent, and worsened with exertion. Pt states she has developed an increase SOB with CP on exertion. She noticed she could only walk short distances, would have to stop and rest then continue. She states her HR is always over 100 and she had an episode where she \"blacked out\" awakened unsure of what happened. Pt denies N/V, fevers, and diarrhea. WBC 12.1 DDimer 281. Troponin 0.02. Will admit pt for further evaluation and treatment. Informant(s) for H&P: Patient and EMR    REVIEW OF SYSTEMS:  Admits to SOB and CP on exertion. Denies fevers, chills, n/v, ha, vision/hearing changes, wt changes, hot/cold flashes, other open skin lesions, diarrhea, constipation, dysuria/hematuria unless noted in HPI. Complete ROS performed with the patient and is otherwise negative.       PMH:  Past Medical History:   Diagnosis Date    BC (acute kidney injury) ([address] 10/1/2019    Cephalgia 10/9/2019    Chronic kidney disease     Depression     Diabetes mellitus ([address]     Diabetic ketoacidosis ([address] 2011    Iron deficiency anemia 10/1/2019    MDRO (multiple drug resistant organisms) resistance     MRSA (methicillin resistant Staphylococcus aureus)     back wound abcess    Non compliance w medication regimen 3/30/2016    Other disorders of kidney and ureter     Pregnancy 3/30/2016    16 weeks    Severe pre-eclampsia in third trimester 2016       Surgical History:  Past Surgical History:   Procedure Laterality Date    BACK SURGERY      abscess     SECTION      x2    CHOLECYSTECTOMY, LAPAROSCOPIC N/A 2019    CHOLECYSTECTOMY LAPAROSCOPIC performed by Lazarus Malik MD at [address]  COLONOSCOPY N/A 6/25/2018    COLONOSCOPY WITH BIOPSY performed by Luis M Carpenter MD at [address] N/A 12/18/2018    COLONOSCOPY WITH BIOPSY performed by Jose Nava MD at [address]  1/31/2012    EF 57%    ECHO COMPL W DOP COLOR FLOW  6/10/2013         RI ESOPHAGOGASTRODUODENOSCOPY TRANSORAL DIAGNOSTIC N/A 5/30/2018    EGD ESOPHAGOGASTRODUODENOSCOPY performed by Luis M Carpenter MD at [address] [facility] TUNNELED VENOUS PORT PLACEMENT  04/2018    UPPER GASTROINTESTINAL ENDOSCOPY  12/18/2018    EGD BIOPSY performed by Jose Nava MD at [facility] 10/11/2019    EGD ESOPHAGOGASTRODUODENOSCOPY performed by Maryuri Chavez DO at [place] [address]       Medications Prior to Admission:    Prior to Admission medications    Medication Sig Start Date End Date Taking? Authorizing Provider   isosorbide mononitrate (IMDUR) 30 MG extended release tablet Take 1 tablet by mouth daily 11/10/19   [facility], APRN - CNP   cloNIDine (CATAPRES) 0.2 MG tablet Take 1 tablet by mouth 3 times daily 11/9/19   [facility], APRN - CNP   simethicone (MYLICON) 80 MG chewable tablet Take 1 tablet by mouth every 6 hours as needed for Flatulence 11/9/19   [facility], APRN - CNP   amLODIPine (NORVASC) 5 MG tablet Take 1 tablet by mouth daily 11/10/19   [facility], APRN - CNP   furosemide (LASIX) 40 MG tablet Take 1 tablet by mouth daily 11/9/19   [facility], APRN - CNP   calcium acetate (PHOSLO) 667 MG capsule Take 667 mg by mouth 3 times daily (with meals)    Historical Provider, MD   insulin lispro (HUMALOG) 100 UNIT/ML injection vial Inject 2 Units into the skin 3 times daily (before meals)    Historical Provider, MD   LORazepam (ATIVAN) 0.5 MG tablet Take 0.5 mg by mouth 2 times daily as needed for Anxiety.     Historical Provider, MD   amitriptyline (ELAVIL) 10 MG tablet Take 1 tablet by mouth nightly 10/17/19 Sathya Salazar MD   metoprolol (LOPRESSOR) 100 MG tablet Take 1 tablet by mouth 2 times daily 10/17/19   Sathya Salazar MD   famotidine (PEPCID) 20 MG tablet Take 1 tablet by mouth 2 times daily  Patient taking differently: Take 20 mg by mouth every morning (before breakfast)  10/17/19 11/16/19  Sathya Salazar MD   insulin glargine (BASAGLAR KWIKPEN) 100 UNIT/ML injection pen Inject 28 Units into the skin nightly New increased dose  Patient taking differently: Inject 6 Units into the skin nightly Before dinner 12/19/18   Grace Hines MD   insulin aspart (NOVOLOG) 100 UNIT/ML injection vial Inject 3 Units into the skin 3 times daily (with meals)     Historical Provider, MD   insulin aspart (NOVOLOG) 100 UNIT/ML injection vial Inject 0-10 Units into the skin 3 times daily (before meals) *Per Sliding Scale*    Historical Provider, MD   glucose (GLUTOSE) 40 % GEL Take 15 g by mouth as needed 4/1/16   Smiley Stovall MD   glucose blood VI test strips (ASCENSIA AUTODISC VI;ONE TOUCH ULTRA TEST VI) strip Indications: 5x a day Freestyle Lite -Tests 5 times daily and as needed for low glucose. E10.10 4/1/16   Smiley Stovall MD   LANCETS THIN by Does not apply route. Indications: cks 5xa a day    Historical Provider, MD   Insulin Syringe-Needle U-100 (INSULIN SYRINGE .5CC/30GX1/2\") 30G X 1/2\" 0.5 ML MISC by Does not apply route. Indications: uses 6-7 a day    Historical Provider, MD       Allergies: Toradol [ketorolac tromethamine] and Hydralazine    Social History:    reports that she has been smoking cigarettes. She has a 3.00 pack-year smoking history. She uses smokeless tobacco. She reports that she does not drink alcohol or use drugs. Family History:   family history includes Asthma in her brother and mother; Diabetes in her mother; High Blood Pressure in her father and mother; Hypertension in her mother.  Reviewed    PHYSICAL EXAM:  Vitals:  BP (!) 143/81   Pulse 108   Temp 97.9 °F (36.6 °C) (Oral)   Resp 20   Ht 5' 4\" (1.626 m)   Wt 138 lb (62.6 kg)   LMP 11/16/2019 (Approximate)   SpO2 98%   BMI 23.69 kg/m²     General Appearance: alert and oriented to person, place and time and in no acute distress  Skin: warm and dry  Head: normocephalic and atraumatic  Eyes: pupils equal, round, and reactive to light, extraocular eye movements intact, conjunctivae normal  Neck: neck supple and non tender without mass   Pulmonary/Chest: clear to auscultation bilaterally- no wheezes, rales or rhonchi, normal air movement, no respiratory distress  Cardiovascular: Tachycardic rate, normal S1 and S2 No rubs, gallops noted. Abdomen: soft, non-tender, non-distended, normal bowel sounds, no masses or organomegaly  Extremities: no cyanosis, no clubbing and no edema  Neurologic: no cranial nerve deficit and speech normal    LABS:  Recent Labs     01/12/20  2310 01/12/20  2350 01/13/20  0005   NA  --  137  --    K  --  3.5  --    CL  --  101  --    CO2  --  24 27   BUN  --  42*  --    CREATININE  --  3.2* 3.3*   GLUCOSE 106 121*  --    CALCIUM  --  9.6  --        Recent Labs     01/12/20  2350   WBC 12.1*   RBC 3.77   HGB 10.9*   HCT 32.2*   MCV 85.4   MCH 28.9   MCHC 33.9   RDW 14.6      MPV 10.1       Recent Labs     01/13/20  0005   POCGLU 111*      Ref. Range 1/12/2020 23:50   D-Dimer, Quant Latest Units: ng/mL       Ref. Range 1/12/2020 23:50   Troponin Latest Ref Range: 0.00 - 0.03 ng/mL 0.02           Radiology: Pending    EKG: Interpretation per ED physician: T wave inversions in inferior and lateral leads, LVH    ASSESSMENT:      Principal Problem:    Dyspnea on exertion  Active Problems:    Poorly controlled type 1 diabetes mellitus ([address]    Essential hypertension    Chest pain, unspecified  Resolved Problems:    * No resolved hospital problems. *      PLAN:    1. Dyspnea on exertion- Last ECHO 10/2019 EF 55% with LVH. DDimer 281  Maintain SPO2 >92%. Cardiology Consult. ECHO. Consider VQ scan.   2. Chest Pain R/O ACS- Heart score 4. Troponin 0.02. EKG with T wave inversions in inferior and lateral leads. ASA 324mg in ED course. Initiate Statin. Lipid panel in am. ASA 81mg daily. EKG in am  3. Leukocytosis- WBC 12.1 Afebrile Lactic 1.9 IVF hydration Repeat CBC in am. Follow cultures  4. BC on CKD- BUN/Crt 42/3.2. Baseline crt 2.8. Follow renal function. Avoid nephrotoxic agents. Consider Nephrology Consult. 5. Poorly Controlled DM I- Glucose 111. POCGT ACHS and PRN. Hypoglycemic protocol. SSI. 6. HTN- Continue home regimen  7. Tobacco Use-  Cessation    Code Status: Full   DVT prophylaxis: Heparin    NOTE: This report was transcribed using voice recognition software. Every effort was made to ensure accuracy; however, inadvertent computerized transcription errors may be present.     Electronically signed by Carlos Sy.  Florentin Hernandez CNP on 1/13/2020 at 4:30 AM

## 2020-01-13 NOTE — CONSULTS
note: past medical records were reviewed per electronic medical record (EMR) - see detailed reports under Past Medical/ Surgical History. PAST MEDICAL HISTORY:    1. Diabetes mellitus. 2. History of DKA. 3. Chronic kidney disease. 4. Depression. Anxiety. 5. Anemia of chronic disease. 6. History of . 7. Laparoscopic cholecystectomy. 8. Hypertension. 9. 2D echocardiogram by Dr. Stefany Valdes 10/2019: EF 55%, mild LVH. Mild pulmonary hypertension. Trace TR. No significant valvular abnormalities. 10. Tobacco abuse. HOME MEDICATIONS:  Prior to Admission medications    Medication Sig Start Date End Date Taking? Authorizing Provider   isosorbide mononitrate (IMDUR) 30 MG extended release tablet Take 1 tablet by mouth daily 11/10/19  Yes [facility] APRBOLIVAR - CNP   cloNIDine (CATAPRES) 0.2 MG tablet Take 1 tablet by mouth 3 times daily 19  Yes [facility]   simethicone (MYLICON) 80 MG chewable tablet Take 1 tablet by mouth every 6 hours as needed for Flatulence 19  Yes [facility] APRBOLIVAR  CNP   amLODIPine (NORVASC) 5 MG tablet Take 1 tablet by mouth daily 11/10/19  Yes [facility]   furosemide (LASIX) 40 MG tablet Take 1 tablet by mouth daily 19  Yes [facility] APRN - CNP   calcium acetate (PHOSLO) 667 MG capsule Take 667 mg by mouth 3 times daily (with meals)   Yes Historical Provider, MD   insulin lispro (HUMALOG) 100 UNIT/ML injection vial Inject 2 Units into the skin 3 times daily (before meals)   Yes Historical Provider, MD   LORazepam (ATIVAN) 0.5 MG tablet Take 0.5 mg by mouth 2 times daily as needed for Anxiety.    Yes Historical Provider, MD   amitriptyline (ELAVIL) 10 MG tablet Take 1 tablet by mouth nightly 10/17/19  Yes José Miguel Kim MD   metoprolol (LOPRESSOR) 100 MG tablet Take 1 tablet by mouth 2 times daily 10/17/19  Yes José Miguel Kim MD   insulin glargine (BASAGLAR KWIKPEN) 100 UNIT/ML injection pen Inject 28 Units into the skin nightly New increased dose  Patient taking differently: Inject 6 Units into the skin nightly Before dinner 12/19/18  Yes Michael Clemens MD   insulin aspart (NOVOLOG) 100 UNIT/ML injection vial Inject 3 Units into the skin 3 times daily (with meals)    Yes Historical Provider, MD   insulin aspart (NOVOLOG) 100 UNIT/ML injection vial Inject 0-10 Units into the skin 3 times daily (before meals) *Per Sliding Scale*   Yes Historical Provider, MD   glucose (GLUTOSE) 40 % GEL Take 15 g by mouth as needed 4/1/16  Yes Selene Betancur MD   glucose blood VI test strips (ASCENSIA AUTODISC VI;ONE TOUCH ULTRA TEST VI) strip Indications: 5x a day Freestyle Lite -Tests 5 times daily and as needed for low glucose. E10.10 4/1/16  Yes Selene Betancur MD   LANCETS THIN by Does not apply route. Indications: cks 5xa a day   Yes Historical Provider, MD   Insulin Syringe-Needle U-100 (INSULIN SYRINGE .5CC/30GX1/2\") 30G X 1/2\" 0.5 ML MISC by Does not apply route.  Indications: uses 6-7 a day   Yes Historical Provider, MD   famotidine (PEPCID) 20 MG tablet Take 1 tablet by mouth 2 times daily  Patient taking differently: Take 20 mg by mouth every morning (before breakfast)  10/17/19 11/16/19  Jaquelin Sanz MD       CURRENT MEDICATIONS:      Current Facility-Administered Medications:     0.9 % sodium chloride infusion, , Intravenous, Once, HEBER Ng CNP, Stopped at 01/13/20 0159    0.9 % sodium chloride infusion, , Intravenous, Continuous, HEBER Ng CNP, Last Rate: 75 mL/hr at 01/13/20 0602    sodium chloride flush 0.9 % injection 10 mL, 10 mL, Intravenous, 2 times per day, HEBER Ng CNP    sodium chloride flush 0.9 % injection 10 mL, 10 mL, Intravenous, PRN, HEBER Ng CNP    magnesium hydroxide (MILK OF MAGNESIA) 400 MG/5ML suspension 30 mL, 30 mL, Oral, Daily PRN, HEBER Ng CNP    ondansetron [facility] injection 4 mg, 4 mg, Intravenous, Q6H PRN, Silviost Misa, APRN - CNP, 4 mg at 01/13/20 0606    atorvastatin (LIPITOR) tablet 40 mg, 40 mg, Oral, Nightly, HEBER Burns - CNP    [START ON 1/14/2020] aspirin chewable tablet 81 mg, 81 mg, Oral, Daily, Gustavo Miller APRN - CNP    nitroGLYCERIN (NITROSTAT) SL tablet 0.4 mg, 0.4 mg, Sublingual, Q5 Min PRN, Gustavo Miller APRN - CNP    heparin (porcine) injection 5,000 Units, 5,000 Units, Subcutaneous, 3 times per day, Gustavo Miller APRN - CNP    glucose (GLUTOSE) 40 % oral gel 15 g, 15 g, Oral, PRN, Gustavo Miller, APRN - CNP    dextrose 50 % IV solution, 12.5 g, Intravenous, PRN, Gustavo Miller, APRN - CNP    glucagon (rDNA) injection 1 mg, 1 mg, Intramuscular, PRN, Gustavo Miller APRN - CNP    dextrose 5 % solution, 100 mL/hr, Intravenous, PRN, Gustavo Miller APRN - CNP    amitriptyline (ELAVIL) tablet 10 mg, 10 mg, Oral, Nightly, Gustavo Miller APRN - CNP    amLODIPine (NORVASC) tablet 5 mg, 5 mg, Oral, Daily, Gustavo Miller APRN - CNP    calcium acetate (PHOSLO) capsule 667 mg, 667 mg, Oral, TID WC, Gustavo Miller APRN - CNP    cloNIDine (CATAPRES) tablet 0.2 mg, 0.2 mg, Oral, TID, Gustavo Miller APRN - CNP    furosemide (LASIX) tablet 40 mg, 40 mg, Oral, Daily, Gustavo Miller APRN - CNP    insulin glargine (LANTUS) injection pen 28 Units, 28 Units, Subcutaneous, Nightly, Gustavo Miller APRN - CNP    isosorbide mononitrate (IMDUR) extended release tablet 30 mg, 30 mg, Oral, Daily, Gustavo Miller, APRN - CNP    LORazepam (ATIVAN) tablet 0.5 mg, 0.5 mg, Oral, BID PRN, Gustavo Miller, APRN - CNP    metoprolol tartrate (LOPRESSOR) tablet 100 mg, 100 mg, Oral, BID, Gustavo Miller, HEBER - CNP      ALLERGIES:  Toradol [ketorolac tromethamine] and Hydralazine    SOCIAL HISTORY:    Current every day tobacco smoker, smokes 1/2 ppd. Denies current alcohol or illicit drug use. FAMILY HISTORY:   Denies pertinent cardiac family medical history.       REVIEW OF or rub. PV: No lower extremity edema. No varicosities. Pedal pulses palpable, no clubbing or cyanosis. ABDOMEN: Soft, non-tender to light palpation. Bowel sounds present. No palpable masses no organomegaly; no abdominal bruit. MS: Good muscle strength and tone. No atrophy or abnormal movements. : Deferred  SKIN: Warm and dry. No statis dermatitis or ulcers. NEURO / PSYCH: Oriented to person, place and time. Speech clear and appropriate. Follows all commands. Pleasant affect. DATA:    Telemetry: Sinus tachycardia, HR 100s-110s. Diagnostic:  CXR 01/13/2020: No acute cardiopulmonary disease. No intake or output data in the 24 hours ending 01/13/20 0907    Labs:   CBC:   Recent Labs     01/12/20  2350 01/13/20  0600   WBC 12.1* 10.9   HGB 10.9* 9.6*   HCT 32.2* 30.3*    282     BMP:   Recent Labs     01/12/20  2350 01/13/20  0005 01/13/20  0600     --  142   K 3.5  --  4.0   CO2 24 27 18*   BUN 42*  --  36*   CREATININE 3.2* 3.3* 2.6*   LABGLOM 21 17 27   CALCIUM 9.6  --  8.0*     Mag:   Recent Labs     01/12/20  2350   MG 1.9     HgA1c:   Lab Results   Component Value Date    LABA1C 8.8 (H) 10/02/2019     CARDIAC ENZYMES:  Recent Labs     01/12/20  2350 01/13/20  0750   TROPONINI 0.02 0.01     FASTING LIPID PANEL:  Lab Results   Component Value Date    CHOL 128 08/04/2012    HDL 28.0 08/04/2012    LDLCALC 67 08/04/2012    TRIG 166 08/04/2012     LIVER PROFILE:  Recent Labs     01/12/20  2350   AST 14   ALT 11   LABALBU 3.2*         ASSESSMENT:  1. Uncontrolled diabetes mellitus, currently in DKA. Elevated beta hydroxybutyrate on admission, with elevated CO2 and anion gap. 2.  Dyspnea on exertion with palpitations. Sinus tachycardia noted on review of telemetry. Likely due to current DKA and dehydration. 3.  Chronic kidney disease. 4.  Anemia of chronic disease. 5.  Hypertension. 6.  Tobacco abuse. 7.  History of cholecystectomy a few months ago.   8.  Orthostatic hypotension. PLAN:  1. Repeat 2D echocardiogram at this time for reevaluation of LV/RV function and valvular heart disease. 2.  Sinus tachycardia likely due to dehydration and DKA. Management of DKA per primary team.  Will increase IV fluids to 150 cc/hr. Patient had positive orthostatic vital signs this morning. 3.  TSH ordered. Random cortisol ordered. 4.  VQ scan ordered for evaluation of possible pulmonary embolism. 5.  Continue cardiac medications the same. 6.  We will continue to follow. The above case was discussed in detail with Dr. Clifton Hopkins. Above assessment and plan made in collaboration with Dr. Clifton Hopkins. Further recommendations and assessment/plan as per Dr. Clifton Hopkins. Electronically signed by Quiana Cunningham PA-C on 1/13/2020 at 9:07 AM       CARDIOLOGY ATTENDING ATTESTATION: I have personally interviewed the patient and obtained key portions of the history and physical necessary for medical decision making. I have also performed my own review of systems and physical exam. All labs and diagnostic testing results have been reviewed personally. My personal assessment and plan as noted below. All orders and medical decision making was done by me. REVIEW OF SYSTEMS:     · · Constitutional: +generalized fatigue. Denies fevers, chills, night sweats. Denies significant weight loss or weight gain. · · HEENT: Denies headaches, nose bleeds, rhinorrhea, sore throat. Denies blurred vision. Denies dysphagia, odynophagia. · · Musculoskeletal: Denies falls, pain to BLE with ambulation. Denies muscle weakness. · · Neurological: Denies dizziness and lightheadedness, numbness and tingling. Denies focal neurological deficits. · · Cardiovascular: +palpitations. Denies chest pain, diaphoresis. Denies syncope. Denies PND, orthopnea, peripheral edema. · · Respiratory: +shortness of breath with exertion. Denies shortness of breath at rest. Denies cough, hemoptysis.   · · Gastrointestinal: Denies abdominal pain, nausea/vomiting, diarrhea and constipation, black/bloody, and tarry stools. · · Genitourinary: Denies dysuria and hematuria. · · Hematologic: Denies excessive bruising or bleeding. · · Endocrine: +excessive thirst. Denies intolerance to hot and cold. · · Psychiatric: +anxiety and +depression. PHYSICAL EXAM:   BP (!) 150/95   Pulse 61   Temp 98.2 °F (36.8 °C) (Oral)   Resp 18   Ht 5' 4\" (1.626 m)   Wt 139 lb (63 kg)   LMP 11/16/2019 (Approximate)   SpO2 100%   BMI 23.86 kg/m²   CONST:  Well developed, well nourished female who appears stated age. Awake, alert, cooperative, no apparent distress  HEENT:   Head- Normocephalic, atraumatic   Eyes- Conjunctivae pink, anicteric  Throat- Oral mucosa pink and moist  Neck-  No stridor, trachea midline, no jugular venous distention. No adenopathy   CHEST: Chest symmetrical and non-tender to palpation. No accessory muscle use or intercostal retractions  RESPIRATORY: Lung sounds - clear throughout fields   CARDIOVASCULAR:     No carotid bruit  Heart Inspection- shows no noted pulsations  Heart Palpation- no heaves or thrills; PMI is non-displaced   Heart Ausculation- Regular rhythm with fast rate, no murmur. No s3, s4 or rub   PV: No lower extremity edema. No varicosities. Pedal pulses palpable, no clubbing or cyanosis   ABDOMEN: Soft, non-tender to light palpation. Bowel sounds present. No palpable masses no organomegaly; no abdominal bruit  MS: Good muscle strength and tone. No atrophy or abnormal movements. : Deferred  SKIN: Warm and dry no statis dermatitis or ulcers   NEURO / PSYCH: Oriented to person, place and time. Speech clear and appropriate. Follows all commands. Pleasant affect         ASSESSMENT:  1. Uncontrolled diabetes mellitus, currently in DKA. Elevated beta hydroxybutyrate on admission, with elevated CO2 and anion gap. 2.  Dyspnea on exertion with palpitations. Sinus tachycardia noted on review of telemetry.   Likely due

## 2020-01-13 NOTE — ED PROVIDER NOTES
Katie Valdes is a 34year old female with PMH of DM, HTN, who presented to ED with concern for DKA and shortness of breath present for 2 days. Patient uses lantus and Humalog for diabetes control. Patient has not had any changes in her insulin, patient reports she has been compliant with medication. Patient has had increased thirst and urination over the past two days. Patient feels she is beginning to go into DKA. Patient did have episode of emesis yesterday but resolved. Patient does not currently have nausea. Patient denies abdominal pain. Patient has also had cough and shortness of breath that is worse with ambulation. Patient had cholecystectomy 11/7 with Dr. Jeff Monterroso. Patient denies cardiac history. Patient denies hx of VTE. Patient is not on blood thinners. Patient checked her BG before arrival to ED and her BG was in the high 200's per patient. Patient has a history uncontrolled type I DM. Patient has had sick contacts. Patient has not tried anything for symptoms, nothing makes them better or worse. Patient's symptoms are mild in severity and continuous. The history is provided by the patient and a relative. Review of Systems   Constitutional: Positive for chills and fatigue. Negative for diaphoresis and fever. Eyes: Negative for photophobia and visual disturbance. Respiratory: Positive for cough and shortness of breath. Negative for chest tightness and wheezing. Cardiovascular: Negative for chest pain, palpitations and leg swelling. Gastrointestinal: Negative for abdominal distention, abdominal pain, diarrhea, nausea and vomiting. Genitourinary: Negative for difficulty urinating, dysuria and menstrual problem. Musculoskeletal: Negative for neck pain and neck stiffness. Skin: Negative for pallor and rash. Neurological: Negative for dizziness, syncope, speech difficulty, weakness, light-headedness and headaches. Psychiatric/Behavioral: Negative for agitation and confusion. <0.2 0.0 - 1.2 mg/dL    Alkaline Phosphatase 166 (H) 35 - 104 U/L    ALT 11 0 - 32 U/L    AST 14 0 - 31 U/L   Lactic Acid, Plasma   Result Value Ref Range    Lactic Acid 0.8 0.5 - 2.2 mmol/L   Beta-Hydroxybutyrate   Result Value Ref Range    Beta-Hydroxybutyrate 1.24 (H) 0.02 - 0.27 mmol/L   pH, venous   Result Value Ref Range    pH, Khurram 7.29 (L) 7.35 - 7.45   Urinalysis with Microscopic   Result Value Ref Range    Color, UA Yellow Straw/Yellow    Clarity, UA Clear Clear    Glucose, Ur >=1000 (A) Negative mg/dL    Bilirubin Urine Negative Negative    Ketones, Urine TRACE (A) Negative mg/dL    Specific Gravity, UA 1.015 1.005 - 1.030    Blood, Urine MODERATE (A) Negative    pH, UA 8.5 5.0 - 9.0    Protein, UA >=300 (A) Negative mg/dL    Urobilinogen, Urine 0.2 <2.0 E.U./dL    Nitrite, Urine Negative Negative    Leukocyte Esterase, Urine Negative Negative    Casts RARE /LPF    WBC, UA 2-5 0 - 5 /HPF    RBC, UA 5-10 (A) 0 - 2 /HPF    Epi Cells FEW /HPF    Bacteria, UA RARE (A) /HPF   Urine Drug Screen   Result Value Ref Range    Amphetamine Screen, Urine NOT DETECTED Negative <1000 ng/mL    Barbiturate Screen, Ur NOT DETECTED Negative < 200 ng/mL    Benzodiazepine Screen, Urine NOT DETECTED Negative < 200 ng/mL    Cannabinoid Scrn, Ur NOT DETECTED Negative < 50ng/mL    Cocaine Metabolite Screen, Urine NOT DETECTED Negative < 300 ng/mL    Opiate Scrn, Ur NOT DETECTED Negative < 300ng/mL    PCP Screen, Urine NOT DETECTED Negative < 25 ng/mL    Methadone Screen, Urine NOT DETECTED Negative <300 ng/mL    Oxycodone Urine POSITIVE (A) Negative <100 ng/mL    FENTANYL SCREEN, URINE NOT DETECTED Negative <1 ng/mL    Drug Screen Comment: see below    Serum Drug Screen   Result Value Ref Range    Ethanol Lvl <10 mg/dL    Acetaminophen Level <5.0 (L) 10.0 - 77.5 mcg/mL    Salicylate, Serum <3.8 0.0 - 30.0 mg/dL   D-Dimer, Quantitative   Result Value Ref Range    D-Dimer, Quant 281 ng/mL DDU   Troponin   Result Value Ref Range Normal    ------------------------------------------ PROGRESS NOTES ------------------------------------------  Re-evaluation(s):  Time: 3am  Patients symptoms show no change  Repeat physical examination is not changed    Counseling:  I have spoken with the patient and discussed todays results, in addition to providing specific details for the plan of care and counseling regarding the diagnosis and prognosis. Their questions are answered at this time and they are agreeable with the plan of admission.    --------------------------------- ADDITIONAL PROVIDER NOTES ---------------------------------  Consultations:   Spoke with Hospitalist.  Discussed case. They will admit the patient. This patient's ED course included: a personal history and physicial examination, re-evaluation prior to disposition, multiple bedside re-evaluations, cardiac monitoring and continuous pulse oximetry    This patient has remained hemodynamically stable during their ED course. Diagnosis:  1. Dehydration    2. Dyspnea on exertion    3. Chest pain, unspecified type        Disposition:  Patient's disposition: Admit to telemetry  Patient's condition is stable. Amina Castro MD  01/13/20 7769    ATTENDING PROVIDER ATTESTATION:     I have personally performed and/or participated in the history, exam, medical decision making, and procedures and agree with all pertinent clinical information. I have also reviewed and agree with the past medical, family and social history unless otherwise noted. I have discussed this patient in detail with the resident, and provided the instruction and education regarding diabetes, dehydration and shortness of breath. My findings/Plan: Tachycardic. Lungs CTA bilaterally. Abdomen soft, nontender. Bowel sounds normal. New T wave inversions noted in inferolateral leads on EKG. Persistently tachycardic. Will admit for further evaluation and treatment.            Bernabe MARION, DO  01/13/20 0503

## 2020-01-13 NOTE — PROGRESS NOTES
[facility] [place] [id number] Hospitalist   Progress Note    Admitting Date and Time: 1/12/2020 10:51 PM  Admit Dx: Chest pain, unspecified [R07.9]    Subjective:    Patient complains of shortness of breath with activity. She describes the feeling as someone holding a pillow over her face when she is trying to walk. She also reports poor appetite. She has left sided abdominal pain when eating. ROS: denies fever, chills, cp, sob, n/v, HA unless stated above.      sodium chloride flush  10 mL Intravenous 2 times per day    atorvastatin  40 mg Oral Nightly    [START ON 1/14/2020] aspirin  81 mg Oral Daily    heparin (porcine)  5,000 Units Subcutaneous 3 times per day    amitriptyline  10 mg Oral Nightly    amLODIPine  5 mg Oral Daily    calcium acetate  667 mg Oral TID WC    [Held by provider] cloNIDine  0.2 mg Oral TID    [Held by provider] isosorbide mononitrate  30 mg Oral Daily    metoprolol  100 mg Oral BID    insulin glargine  6 Units Subcutaneous Nightly    [START ON 1/14/2020] pantoprazole  40 mg Oral QAM AC    insulin lispro  0-6 Units Subcutaneous TID WC    insulin lispro  0-3 Units Subcutaneous Nightly    insulin lispro  3 Units Subcutaneous TID WC     sodium chloride flush, 10 mL, PRN  magnesium hydroxide, 30 mL, Daily PRN  ondansetron, 4 mg, Q6H PRN  nitroGLYCERIN, 0.4 mg, Q5 Min PRN  LORazepam, 0.5 mg, BID PRN  glucose, 15 g, PRN  dextrose, 12.5 g, PRN  glucagon (rDNA), 1 mg, PRN  dextrose, 100 mL/hr, PRN  perflutren lipid microspheres, 1.5 mL, ONCE PRN         Objective:    BP (!) 142/91   Pulse 120   Temp 98.2 °F (36.8 °C) (Oral)   Resp 18   Ht 5' 4\" (1.626 m)   Wt 139 lb (63 kg)   LMP 11/16/2019 (Approximate)   SpO2 98%   BMI 23.86 kg/m²   General Appearance: alert and oriented to person, place and time and in no acute distress  Skin: warm and dry  Head: normocephalic and atraumatic  Eyes: pupils equal, round, and reactive to light, extraocular eye movements intact, conjunctivae normal  Neck: neck supple and non tender without mass   Pulmonary/Chest: clear to auscultation bilaterally- no wheezes, rales or rhonchi, normal air movement, no respiratory distress  Cardiovascular: normal rate, normal S1 and S2   Abdomen: soft, non-tender, non-distended, normal bowel sounds, no masses or organomegaly  Extremities: no cyanosis, no clubbing and no edema  Neurologic: no cranial nerve deficit and speech normal      Recent Labs     01/12/20  2310 01/12/20  2350 01/13/20  0005 01/13/20  0600   NA  --  137  --  142   K  --  3.5  --  4.0   CL  --  101  --  107   CO2  --  24 27 18*   BUN  --  42*  --  36*   CREATININE  --  3.2* 3.3* 2.6*   GLUCOSE 106 121*  --  313*   CALCIUM  --  9.6  --  8.0*       Recent Labs     01/12/20 2350 01/13/20  0600   WBC 12.1* 10.9   RBC 3.77 3.37*   HGB 10.9* 9.6*   HCT 32.2* 30.3*   MCV 85.4 89.9   MCH 28.9 28.5   MCHC 33.9 31.7*   RDW 14.6 14.8    282   MPV 10.1 11.6           Radiology:   NM LUNG VENT/PERFUSION (VQ)   Final Result   Unremarkable ventilation-perfusion study. XR CHEST STANDARD (2 VW)   Final Result   No acute cardiopulmonary disease. US DUP LOWER EXTREMITIES BILATERAL VENOUS   Final Result   No sonographic evidence of bilateral lower extremity deep   venous thrombosis. XR CHEST 1 VW   Final Result   No acute cardiopulmonary disease. Assessment:  Principal Problem:    Dyspnea on exertion  Active Problems:    Poorly controlled type 1 diabetes mellitus (HCC)    Tobacco use    Essential hypertension    Chest pain, unspecified  Resolved Problems:    * No resolved hospital problems. *      Plan:  1. Shortness of breath:  Echo on 10/30/19 revealed an EF of 55%. D dimer was 281. Unable to have CTA of the chest due to BC. VQ scan negative. Repeat Echo ordered by Cardiology. Rapid flu negative. Check respiratory array. 2. Chest pain:  Heart score 4.   Troponin 0.02 EKG with T wave inversions in inferior and lateral leads. Troponin 0.02 -> 0.01 -> <0.01. Cardiology following. 3. Tachycardia:  Continue metoprolol. 4. Leukocytosis:  WBC 12.1 -> 10.9. May be due to dehydration. 5. Orthostatic hypotension:  /91 () lying, 127/91 () sitting, 114/72 () standing. Hold Imdur. Check orthos q shift. IV fluids. 6. Acute kidney injury on chronic kidney disease: Creatinine was 3.2 on admission and is now 2.6 with IV fluids. 7. Metabolic acidosis:  Change fluids to 1/2 NS. Consult Nephrology for oral bicarb. 8. Poorly controlled diabetes:  Hgb A1C is 9.8 (8.8 on 10/2/19). Continue Insulin sliding scale with blood sugar checks, hypoglycemic protocol. 9. Gastroparesis:  EGD on 10/11/19 showed a large amount of solid food in the stomach. Gastric emptying studying on 10/12/19 with significantly delayed gastric emptying with persistent uptake within the esophagus. 10. Hypertension:  Continue home medications, except Imdur due to orthostatic hypotension. 11. Vitamin D deficiency: Vitamin D level is <5. Start on Vitamin D 89026 units weekly. 12. Tobacco use:  Smoking cessation education. NOTE: This report was transcribed using voice recognition software.  Every effort was made to ensure accuracy; however, inadvertent computerized transcription errors may be present.     Electronically signed by HEBER Whitney CNP on 1/13/2020 at 3:03 PM

## 2020-01-13 NOTE — PROGRESS NOTES
[facility] Quality Flow/Interdisciplinary Rounds Progress Note        Quality Flow Rounds held on January 13, 2020    Disciplines Attending:  Bedside Nurse, ,  and Nursing Unit Leadership    Melisa Rosales was admitted on 1/12/2020 10:51 PM    Anticipated Discharge Date:  Expected Discharge Date: 01/20/20    Disposition:    Benedicto Score:  Benedicto Scale Score: 20    Readmission Risk              Risk of Unplanned Readmission:        47           Discussed patient goal for the day, patient clinical progression, and barriers to discharge.   The following Goal(s) of the Day/Commitment(s) have been identified:  Activity progression, From Home, CP, NPO, Port       [facility]  January 13, 2020

## 2020-01-14 ENCOUNTER — APPOINTMENT (OUTPATIENT)
Dept: CT IMAGING | Age: 30
DRG: 469 | End: 2020-01-14
Payer: COMMERCIAL

## 2020-01-14 LAB
ALBUMIN SERPL-MCNC: 2.5 G/DL (ref 3.5–5.2)
ALP BLD-CCNC: 122 U/L (ref 35–104)
ALT SERPL-CCNC: 7 U/L (ref 0–32)
ANION GAP SERPL CALCULATED.3IONS-SCNC: 11 MMOL/L (ref 7–16)
AST SERPL-CCNC: 11 U/L (ref 0–31)
BILIRUB SERPL-MCNC: <0.2 MG/DL (ref 0–1.2)
BILIRUBIN DIRECT: <0.2 MG/DL (ref 0–0.3)
BILIRUBIN, INDIRECT: ABNORMAL MG/DL (ref 0–1)
BUN BLDV-MCNC: 30 MG/DL (ref 6–20)
CALCIUM SERPL-MCNC: 8 MG/DL (ref 8.6–10.2)
CHLORIDE BLD-SCNC: 107 MMOL/L (ref 98–107)
CHOLESTEROL, TOTAL: 95 MG/DL (ref 0–199)
CO2: 23 MMOL/L (ref 22–29)
CREAT SERPL-MCNC: 2.3 MG/DL (ref 0.5–1)
EKG ATRIAL RATE: 56 BPM
EKG P AXIS: 35 DEGREES
EKG P-R INTERVAL: 118 MS
EKG Q-T INTERVAL: 502 MS
EKG QRS DURATION: 110 MS
EKG QTC CALCULATION (BAZETT): 484 MS
EKG R AXIS: 48 DEGREES
EKG T AXIS: 48 DEGREES
EKG VENTRICULAR RATE: 56 BPM
GFR AFRICAN AMERICAN: 31
GFR NON-AFRICAN AMERICAN: 31 ML/MIN/1.73
GLUCOSE BLD-MCNC: 34 MG/DL (ref 74–99)
HCT VFR BLD CALC: 25.7 % (ref 34–48)
HDLC SERPL-MCNC: 33 MG/DL
HEMOGLOBIN: 8.3 G/DL (ref 11.5–15.5)
LDL CHOLESTEROL CALCULATED: 46 MG/DL (ref 0–99)
MAGNESIUM: 1.5 MG/DL (ref 1.6–2.6)
MCH RBC QN AUTO: 28.8 PG (ref 26–35)
MCHC RBC AUTO-ENTMCNC: 32.3 % (ref 32–34.5)
MCV RBC AUTO: 89.2 FL (ref 80–99.9)
METER GLUCOSE: 106 MG/DL (ref 74–99)
METER GLUCOSE: 118 MG/DL (ref 74–99)
METER GLUCOSE: 126 MG/DL (ref 74–99)
METER GLUCOSE: 206 MG/DL (ref 74–99)
METER GLUCOSE: 354 MG/DL (ref 74–99)
METER GLUCOSE: <40 MG/DL (ref 74–99)
PDW BLD-RTO: 14.9 FL (ref 11.5–15)
PHOSPHORUS: 3.1 MG/DL (ref 2.5–4.5)
PLATELET # BLD: 219 E9/L (ref 130–450)
PMV BLD AUTO: 10.8 FL (ref 7–12)
POTASSIUM SERPL-SCNC: 3.6 MMOL/L (ref 3.5–5)
RBC # BLD: 2.88 E12/L (ref 3.5–5.5)
SODIUM BLD-SCNC: 141 MMOL/L (ref 132–146)
TOTAL PROTEIN: 5.4 G/DL (ref 6.4–8.3)
TRIGL SERPL-MCNC: 82 MG/DL (ref 0–149)
VLDLC SERPL CALC-MCNC: 16 MG/DL
WBC # BLD: 7.3 E9/L (ref 4.5–11.5)

## 2020-01-14 PROCEDURE — 96365 THER/PROPH/DIAG IV INF INIT: CPT

## 2020-01-14 PROCEDURE — 6370000000 HC RX 637 (ALT 250 FOR IP): Performed by: NURSE PRACTITIONER

## 2020-01-14 PROCEDURE — 96366 THER/PROPH/DIAG IV INF ADDON: CPT

## 2020-01-14 PROCEDURE — 2580000003 HC RX 258: Performed by: INTERNAL MEDICINE

## 2020-01-14 PROCEDURE — 83735 ASSAY OF MAGNESIUM: CPT

## 2020-01-14 PROCEDURE — 84100 ASSAY OF PHOSPHORUS: CPT

## 2020-01-14 PROCEDURE — APPSS30 APP SPLIT SHARED TIME 16-30 MINUTES: Performed by: NURSE PRACTITIONER

## 2020-01-14 PROCEDURE — 82962 GLUCOSE BLOOD TEST: CPT

## 2020-01-14 PROCEDURE — 96375 TX/PRO/DX INJ NEW DRUG ADDON: CPT

## 2020-01-14 PROCEDURE — 85027 COMPLETE CBC AUTOMATED: CPT

## 2020-01-14 PROCEDURE — 99233 SBSQ HOSP IP/OBS HIGH 50: CPT | Performed by: INTERNAL MEDICINE

## 2020-01-14 PROCEDURE — 71250 CT THORAX DX C-: CPT

## 2020-01-14 PROCEDURE — 80061 LIPID PANEL: CPT

## 2020-01-14 PROCEDURE — 80048 BASIC METABOLIC PNL TOTAL CA: CPT

## 2020-01-14 PROCEDURE — 96376 TX/PRO/DX INJ SAME DRUG ADON: CPT

## 2020-01-14 PROCEDURE — 2500000003 HC RX 250 WO HCPCS: Performed by: INTERNAL MEDICINE

## 2020-01-14 PROCEDURE — 96361 HYDRATE IV INFUSION ADD-ON: CPT

## 2020-01-14 PROCEDURE — 80076 HEPATIC FUNCTION PANEL: CPT

## 2020-01-14 PROCEDURE — 36415 COLL VENOUS BLD VENIPUNCTURE: CPT

## 2020-01-14 PROCEDURE — 2580000003 HC RX 258: Performed by: NURSE PRACTITIONER

## 2020-01-14 PROCEDURE — 99214 OFFICE O/P EST MOD 30 MIN: CPT | Performed by: INTERNAL MEDICINE

## 2020-01-14 PROCEDURE — G0378 HOSPITAL OBSERVATION PER HR: HCPCS

## 2020-01-14 PROCEDURE — 6360000002 HC RX W HCPCS: Performed by: NURSE PRACTITIONER

## 2020-01-14 PROCEDURE — 93005 ELECTROCARDIOGRAM TRACING: CPT | Performed by: NURSE PRACTITIONER

## 2020-01-14 RX ORDER — MAGNESIUM SULFATE IN WATER 40 MG/ML
2 INJECTION, SOLUTION INTRAVENOUS ONCE
Status: COMPLETED | OUTPATIENT
Start: 2020-01-14 | End: 2020-01-14

## 2020-01-14 RX ORDER — LEVALBUTEROL INHALATION SOLUTION 1.25 MG/3ML
1.25 SOLUTION RESPIRATORY (INHALATION) EVERY 8 HOURS PRN
Status: DISCONTINUED | OUTPATIENT
Start: 2020-01-14 | End: 2020-01-17

## 2020-01-14 RX ADMIN — ASPIRIN 81 MG 81 MG: 81 TABLET ORAL at 10:15

## 2020-01-14 RX ADMIN — INSULIN LISPRO 5 UNITS: 100 INJECTION, SOLUTION INTRAVENOUS; SUBCUTANEOUS at 13:10

## 2020-01-14 RX ADMIN — CLONIDINE HYDROCHLORIDE 0.2 MG: 0.2 TABLET ORAL at 21:36

## 2020-01-14 RX ADMIN — DEXTROSE 50 % IN WATER (D50W) INTRAVENOUS SYRINGE 12.5 G: at 06:25

## 2020-01-14 RX ADMIN — AMITRIPTYLINE HYDROCHLORIDE 10 MG: 10 TABLET, FILM COATED ORAL at 21:36

## 2020-01-14 RX ADMIN — CALCIUM ACETATE 667 MG: 667 CAPSULE ORAL at 18:03

## 2020-01-14 RX ADMIN — MAGNESIUM SULFATE HEPTAHYDRATE 2 G: 40 INJECTION, SOLUTION INTRAVENOUS at 10:16

## 2020-01-14 RX ADMIN — LORAZEPAM 0.5 MG: 0.5 TABLET ORAL at 21:41

## 2020-01-14 RX ADMIN — CALCIUM ACETATE 667 MG: 667 CAPSULE ORAL at 12:57

## 2020-01-14 RX ADMIN — INSULIN LISPRO 3 UNITS: 100 INJECTION, SOLUTION INTRAVENOUS; SUBCUTANEOUS at 12:58

## 2020-01-14 RX ADMIN — CLONIDINE HYDROCHLORIDE 0.2 MG: 0.2 TABLET ORAL at 14:41

## 2020-01-14 RX ADMIN — INSULIN LISPRO 3 UNITS: 100 INJECTION, SOLUTION INTRAVENOUS; SUBCUTANEOUS at 18:01

## 2020-01-14 RX ADMIN — CLONIDINE HYDROCHLORIDE 0.2 MG: 0.2 TABLET ORAL at 10:15

## 2020-01-14 RX ADMIN — CALCIUM ACETATE 667 MG: 667 CAPSULE ORAL at 10:16

## 2020-01-14 RX ADMIN — AMLODIPINE BESYLATE 5 MG: 5 TABLET ORAL at 10:15

## 2020-01-14 RX ADMIN — INSULIN GLARGINE 6 UNITS: 100 INJECTION, SOLUTION SUBCUTANEOUS at 21:38

## 2020-01-14 RX ADMIN — METOPROLOL TARTRATE 100 MG: 50 TABLET, FILM COATED ORAL at 21:36

## 2020-01-14 RX ADMIN — SODIUM BICARBONATE: 84 INJECTION, SOLUTION INTRAVENOUS at 21:45

## 2020-01-14 RX ADMIN — PANTOPRAZOLE SODIUM 40 MG: 40 TABLET, DELAYED RELEASE ORAL at 06:25

## 2020-01-14 RX ADMIN — ATORVASTATIN CALCIUM 40 MG: 40 TABLET, FILM COATED ORAL at 21:36

## 2020-01-14 RX ADMIN — METOPROLOL TARTRATE 100 MG: 50 TABLET, FILM COATED ORAL at 10:14

## 2020-01-14 RX ADMIN — INSULIN LISPRO 2 UNITS: 100 INJECTION, SOLUTION INTRAVENOUS; SUBCUTANEOUS at 18:02

## 2020-01-14 NOTE — CARE COORDINATION
CM note: per physician rounds, plan is to discharge today if ok with consulting physicians. Patient is independent and plan is to return home with no discharge needs.

## 2020-01-14 NOTE — CONSULTS
1/31/2012    EF 57%    ECHO COMPL W DOP COLOR FLOW  6/10/2013         KS ESOPHAGOGASTRODUODENOSCOPY TRANSORAL DIAGNOSTIC N/A 5/30/2018    EGD ESOPHAGOGASTRODUODENOSCOPY performed by Joss Bhandari MD at [address] TUNNELED VENOUS PORT PLACEMENT  04/2018    UPPER GASTROINTESTINAL ENDOSCOPY  12/18/2018    EGD BIOPSY performed by Letty Guerrero MD at [facility] N/A 10/11/2019    EGD ESOPHAGOGASTRODUODENOSCOPY performed by Lawrence Angelo DO at [place] [address]     Current Medications:    Current Facility-Administered Medications: levalbuterol (XOPENEX) nebulizer solution 1.25 mg, 1.25 mg, Nebulization, Q8H PRN  0.9 % sodium chloride infusion, , Intravenous, Once  sodium chloride flush 0.9 % injection 10 mL, 10 mL, Intravenous, 2 times per day  sodium chloride flush 0.9 % injection 10 mL, 10 mL, Intravenous, PRN  magnesium hydroxide (MILK OF MAGNESIA) 400 MG/5ML suspension 30 mL, 30 mL, Oral, Daily PRN  ondansetron (ZOFRAN) injection 4 mg, 4 mg, Intravenous, Q6H PRN  atorvastatin (LIPITOR) tablet 40 mg, 40 mg, Oral, Nightly  aspirin chewable tablet 81 mg, 81 mg, Oral, Daily  nitroGLYCERIN (NITROSTAT) SL tablet 0.4 mg, 0.4 mg, Sublingual, Q5 Min PRN  heparin (porcine) injection 5,000 Units, 5,000 Units, Subcutaneous, 3 times per day  amitriptyline (ELAVIL) tablet 10 mg, 10 mg, Oral, Nightly  amLODIPine (NORVASC) tablet 5 mg, 5 mg, Oral, Daily  calcium acetate (PHOSLO) capsule 667 mg, 667 mg, Oral, TID WC  [Held by provider] isosorbide mononitrate (IMDUR) extended release tablet 30 mg, 30 mg, Oral, Daily  LORazepam (ATIVAN) tablet 0.5 mg, 0.5 mg, Oral, BID PRN  insulin glargine (LANTUS) injection vial 6 Units, 6 Units, Subcutaneous, Nightly  glucose (GLUTOSE) 40 % oral gel 15 g, 15 g, Oral, PRN  dextrose 50 % IV solution, 12.5 g, Intravenous, PRN  glucagon (rDNA) injection 1 mg, 1 mg, Intramuscular, PRN  dextrose 5 % solution, 100 mL/hr, Intravenous, PRN  insulin lispro PULSE:  Pulse: 61  24HR PULSE RANGE: Pulse  Av.5  Min: 61  Max: 80  24HR BLOOD PRESSURE RANGE:  Systolic (68YKK), BAX:289 , Min:99 , VGP:244   ; Diastolic (65JKK), VGX:17, Min:61, Max:95    8HR BLOOD PRESSURE RANGE:     24HR INTAKE/OUTPUT:    Intake/Output Summary (Last 24 hours) at 2020 1704  Last data filed at 2020 1449  Gross per 24 hour   Intake 1850.83 ml   Output --   Net 1850.83 ml     8HR INTAKE OUTPUT:  No intake/output data recorded.     Constitutional:  Awake and appears in NAD, chronically ill, well developed  HEENT:  NC/AT, PERRL, oral mucosa dry , neck supple , no JVD  Respiratory:  Clear bilaterally  Cardiovascular/Edema:  RRR, no rub or gallop  Gastrointestinal:  Soft, bowel sounds present, no organomegaly  Neurologic:  Alert and oriented x 3 no focal deficit  Skin:  Decreased turgor  Other:  No rash    DATA:    CBC with Differential:    Lab Results   Component Value Date    WBC 7.3 2020    RBC 2.88 2020    HGB 8.3 2020    HCT 25.7 2020     2020    MCV 89.2 2020    MCH 28.8 2020    MCHC 32.3 2020    RDW 14.9 2020    SEGSPCT 67 2013    LYMPHOPCT 19.5 2020    MONOPCT 6.3 2020    BASOPCT 0.9 2020    MONOSABS 0.77 2020    LYMPHSABS 2.37 2020    EOSABS 0.22 2020    BASOSABS 0.11 2020     CMP:    Lab Results   Component Value Date     2020    K 3.6 2020    K 4.0 2020     2020    CO2 23 2020    BUN 30 2020    CREATININE 2.3 2020    GFRAA 31 2020    LABGLOM 31 2020    GLUCOSE 34 2020    GLUCOSE 130 2012    PROT 5.4 2020    LABALBU 2.5 2020    LABALBU 4.1 2012    CALCIUM 8.0 2020    BILITOT <0.2 2020    ALKPHOS 122 2020    AST 11 2020    ALT 7 2020     Magnesium:    Lab Results   Component Value Date    MG 1.5 2020     Phosphorus:    Lab Results   Component

## 2020-01-14 NOTE — PROGRESS NOTES
3. Case discussed with hospitalist. Will order chest CT for further evaluation. Recommend pulmonology consult. 4. Will continue to follow.        Electronically signed by Melvin Rendon DO on 1/14/2020 at 2:12 PM

## 2020-01-14 NOTE — PROGRESS NOTES
[facility] [place] [address] Hospitalist   Progress Note    Admitting Date and Time: 1/12/2020 10:51 PM  Admit Dx: Chest pain, unspecified [R07.9]    Subjective:    Patient reports that she did not eat well last night and that is why her blood sugar was 34 this morning. She said that she ate better this am.       ROS: denies fever, chills, cp, sob, n/v, HA unless stated above.      magnesium sulfate  2 g Intravenous Once    sodium chloride flush  10 mL Intravenous 2 times per day    atorvastatin  40 mg Oral Nightly    aspirin  81 mg Oral Daily    heparin (porcine)  5,000 Units Subcutaneous 3 times per day    amitriptyline  10 mg Oral Nightly    amLODIPine  5 mg Oral Daily    calcium acetate  667 mg Oral TID WC    [Held by provider] isosorbide mononitrate  30 mg Oral Daily    insulin glargine  6 Units Subcutaneous Nightly    insulin lispro  0-6 Units Subcutaneous TID WC    insulin lispro  0-3 Units Subcutaneous Nightly    insulin lispro  3 Units Subcutaneous TID WC    metoprolol  100 mg Oral BID    cloNIDine  0.2 mg Oral TID    pantoprazole  40 mg Oral QAM AC    [START ON 1/16/2020] vitamin D  50,000 Units Oral Once per day on Mon Thu     sodium chloride flush, 10 mL, PRN  magnesium hydroxide, 30 mL, Daily PRN  ondansetron, 4 mg, Q6H PRN  nitroGLYCERIN, 0.4 mg, Q5 Min PRN  LORazepam, 0.5 mg, BID PRN  glucose, 15 g, PRN  dextrose, 12.5 g, PRN  glucagon (rDNA), 1 mg, PRN  dextrose, 100 mL/hr, PRN  perflutren lipid microspheres, 1.5 mL, ONCE PRN  calcium carbonate, 500 mg, TID PRN         Objective:    BP (!) 150/95   Pulse 61   Temp 98.2 °F (36.8 °C) (Oral)   Resp 18   Ht 5' 4\" (1.626 m)   Wt 139 lb (63 kg)   LMP 11/16/2019 (Approximate)   SpO2 100%   BMI 23.86 kg/m²   General Appearance: alert and oriented to person, place and time and in no acute distress  Skin: warm and dry  Head: normocephalic and atraumatic  Eyes: pupils equal, round, and reactive to light, extraocular eye movements

## 2020-01-15 LAB
ABO/RH: NORMAL
ALBUMIN SERPL-MCNC: 2.4 G/DL (ref 3.5–5.2)
ALP BLD-CCNC: 115 U/L (ref 35–104)
ALT SERPL-CCNC: 8 U/L (ref 0–32)
ANION GAP SERPL CALCULATED.3IONS-SCNC: 6 MMOL/L (ref 7–16)
ANTIBODY SCREEN: NORMAL
AST SERPL-CCNC: 17 U/L (ref 0–31)
BILIRUB SERPL-MCNC: <0.2 MG/DL (ref 0–1.2)
BUN BLDV-MCNC: 31 MG/DL (ref 6–20)
CALCIUM SERPL-MCNC: 7.9 MG/DL (ref 8.6–10.2)
CHLORIDE BLD-SCNC: 106 MMOL/L (ref 98–107)
CO2: 26 MMOL/L (ref 22–29)
CREAT SERPL-MCNC: 2.3 MG/DL (ref 0.5–1)
CREATININE URINE: 59 MG/DL (ref 29–226)
CREATININE URINE: 61 MG/DL (ref 29–226)
EKG ATRIAL RATE: 116 BPM
EKG P AXIS: 66 DEGREES
EKG P-R INTERVAL: 128 MS
EKG Q-T INTERVAL: 320 MS
EKG QRS DURATION: 92 MS
EKG QTC CALCULATION (BAZETT): 444 MS
EKG R AXIS: 62 DEGREES
EKG T AXIS: -119 DEGREES
EKG VENTRICULAR RATE: 116 BPM
FERRITIN: 180 NG/ML
FOLATE: 4.1 NG/ML (ref 4.8–24.2)
GFR AFRICAN AMERICAN: 31
GFR NON-AFRICAN AMERICAN: 31 ML/MIN/1.73
GLUCOSE BLD-MCNC: 117 MG/DL (ref 74–99)
HCT VFR BLD CALC: 23.7 % (ref 34–48)
HCT VFR BLD CALC: 27.5 % (ref 34–48)
HEMOGLOBIN: 7.6 G/DL (ref 11.5–15.5)
HEMOGLOBIN: 9.1 G/DL (ref 11.5–15.5)
IRON SATURATION: 51 % (ref 15–50)
IRON: 44 MCG/DL (ref 37–145)
MAGNESIUM: 2 MG/DL (ref 1.6–2.6)
MCH RBC QN AUTO: 28.5 PG (ref 26–35)
MCHC RBC AUTO-ENTMCNC: 32.1 % (ref 32–34.5)
MCV RBC AUTO: 88.8 FL (ref 80–99.9)
METER GLUCOSE: 125 MG/DL (ref 74–99)
METER GLUCOSE: 196 MG/DL (ref 74–99)
METER GLUCOSE: 400 MG/DL (ref 74–99)
METER GLUCOSE: 86 MG/DL (ref 74–99)
MICROALBUMIN UR-MCNC: 1740.1 MG/L
MICROALBUMIN/CREAT UR-RTO: 2949.3 (ref 0–30)
OSMOLALITY URINE: 426 MOSM/KG (ref 300–900)
PARATHYROID HORMONE INTACT: 71 PG/ML (ref 15–65)
PDW BLD-RTO: 15 FL (ref 11.5–15)
PHOSPHORUS: 2.4 MG/DL (ref 2.5–4.5)
PLATELET # BLD: 213 E9/L (ref 130–450)
PMV BLD AUTO: 11.2 FL (ref 7–12)
POTASSIUM SERPL-SCNC: 3.8 MMOL/L (ref 3.5–5)
PROTEIN PROTEIN: 300 MG/DL (ref 0–12)
PROTEIN/CREAT RATIO: 4.9
PROTEIN/CREAT RATIO: 4.9 (ref 0–0.2)
RBC # BLD: 2.67 E12/L (ref 3.5–5.5)
SODIUM BLD-SCNC: 138 MMOL/L (ref 132–146)
SODIUM URINE: 68 MMOL/L
TOTAL IRON BINDING CAPACITY: 86 MCG/DL (ref 250–450)
TOTAL PROTEIN: 5 G/DL (ref 6.4–8.3)
UREA NITROGEN, UR: 294 MG/DL (ref 800–1666)
VITAMIN B-12: 1128 PG/ML (ref 211–946)
VITAMIN D 25-HYDROXY: <5 NG/ML (ref 30–100)
WBC # BLD: 6.6 E9/L (ref 4.5–11.5)

## 2020-01-15 PROCEDURE — 6370000000 HC RX 637 (ALT 250 FOR IP): Performed by: NURSE PRACTITIONER

## 2020-01-15 PROCEDURE — 2580000003 HC RX 258: Performed by: NURSE PRACTITIONER

## 2020-01-15 PROCEDURE — 84100 ASSAY OF PHOSPHORUS: CPT

## 2020-01-15 PROCEDURE — 80053 COMPREHEN METABOLIC PANEL: CPT

## 2020-01-15 PROCEDURE — 85027 COMPLETE CBC AUTOMATED: CPT

## 2020-01-15 PROCEDURE — 86900 BLOOD TYPING SEROLOGIC ABO: CPT

## 2020-01-15 PROCEDURE — 83970 ASSAY OF PARATHORMONE: CPT

## 2020-01-15 PROCEDURE — G0378 HOSPITAL OBSERVATION PER HR: HCPCS

## 2020-01-15 PROCEDURE — 83540 ASSAY OF IRON: CPT

## 2020-01-15 PROCEDURE — P9016 RBC LEUKOCYTES REDUCED: HCPCS

## 2020-01-15 PROCEDURE — 82728 ASSAY OF FERRITIN: CPT

## 2020-01-15 PROCEDURE — 87324 CLOSTRIDIUM AG IA: CPT

## 2020-01-15 PROCEDURE — 36430 TRANSFUSION BLD/BLD COMPNT: CPT

## 2020-01-15 PROCEDURE — APPSS30 APP SPLIT SHARED TIME 16-30 MINUTES: Performed by: NURSE PRACTITIONER

## 2020-01-15 PROCEDURE — 82044 UR ALBUMIN SEMIQUANTITATIVE: CPT

## 2020-01-15 PROCEDURE — 6360000002 HC RX W HCPCS: Performed by: INTERNAL MEDICINE

## 2020-01-15 PROCEDURE — 86901 BLOOD TYPING SEROLOGIC RH(D): CPT

## 2020-01-15 PROCEDURE — 2580000003 HC RX 258: Performed by: INTERNAL MEDICINE

## 2020-01-15 PROCEDURE — 84300 ASSAY OF URINE SODIUM: CPT

## 2020-01-15 PROCEDURE — 82962 GLUCOSE BLOOD TEST: CPT

## 2020-01-15 PROCEDURE — 36415 COLL VENOUS BLD VENIPUNCTURE: CPT

## 2020-01-15 PROCEDURE — 83735 ASSAY OF MAGNESIUM: CPT

## 2020-01-15 PROCEDURE — 99233 SBSQ HOSP IP/OBS HIGH 50: CPT | Performed by: INTERNAL MEDICINE

## 2020-01-15 PROCEDURE — 83935 ASSAY OF URINE OSMOLALITY: CPT

## 2020-01-15 PROCEDURE — 83550 IRON BINDING TEST: CPT

## 2020-01-15 PROCEDURE — 85014 HEMATOCRIT: CPT

## 2020-01-15 PROCEDURE — 85018 HEMOGLOBIN: CPT

## 2020-01-15 PROCEDURE — 87449 NOS EACH ORGANISM AG IA: CPT

## 2020-01-15 PROCEDURE — 87045 FECES CULTURE AEROBIC BACT: CPT

## 2020-01-15 PROCEDURE — 1200000000 HC SEMI PRIVATE

## 2020-01-15 PROCEDURE — 6370000000 HC RX 637 (ALT 250 FOR IP): Performed by: INTERNAL MEDICINE

## 2020-01-15 PROCEDURE — 84540 ASSAY OF URINE/UREA-N: CPT

## 2020-01-15 PROCEDURE — 86923 COMPATIBILITY TEST ELECTRIC: CPT

## 2020-01-15 PROCEDURE — 87493 C DIFF AMPLIFIED PROBE: CPT

## 2020-01-15 PROCEDURE — 82746 ASSAY OF FOLIC ACID SERUM: CPT

## 2020-01-15 PROCEDURE — 82306 VITAMIN D 25 HYDROXY: CPT

## 2020-01-15 PROCEDURE — 82607 VITAMIN B-12: CPT

## 2020-01-15 PROCEDURE — 86850 RBC ANTIBODY SCREEN: CPT

## 2020-01-15 PROCEDURE — 82570 ASSAY OF URINE CREATININE: CPT

## 2020-01-15 PROCEDURE — 87088 URINE BACTERIA CULTURE: CPT

## 2020-01-15 PROCEDURE — 84156 ASSAY OF PROTEIN URINE: CPT

## 2020-01-15 RX ORDER — 0.9 % SODIUM CHLORIDE 0.9 %
20 INTRAVENOUS SOLUTION INTRAVENOUS ONCE
Status: COMPLETED | OUTPATIENT
Start: 2020-01-15 | End: 2020-01-15

## 2020-01-15 RX ORDER — SODIUM CHLORIDE 9 MG/ML
INJECTION, SOLUTION INTRAVENOUS CONTINUOUS
Status: ACTIVE | OUTPATIENT
Start: 2020-01-15 | End: 2020-01-15

## 2020-01-15 RX ORDER — ACETAMINOPHEN 325 MG/1
650 TABLET ORAL EVERY 6 HOURS PRN
Status: DISCONTINUED | OUTPATIENT
Start: 2020-01-15 | End: 2020-01-20 | Stop reason: HOSPADM

## 2020-01-15 RX ADMIN — ASPIRIN 81 MG 81 MG: 81 TABLET ORAL at 08:55

## 2020-01-15 RX ADMIN — INSULIN LISPRO 3 UNITS: 100 INJECTION, SOLUTION INTRAVENOUS; SUBCUTANEOUS at 12:40

## 2020-01-15 RX ADMIN — INSULIN LISPRO 1 UNITS: 100 INJECTION, SOLUTION INTRAVENOUS; SUBCUTANEOUS at 21:43

## 2020-01-15 RX ADMIN — INSULIN GLARGINE 6 UNITS: 100 INJECTION, SOLUTION SUBCUTANEOUS at 21:43

## 2020-01-15 RX ADMIN — ATORVASTATIN CALCIUM 40 MG: 40 TABLET, FILM COATED ORAL at 21:35

## 2020-01-15 RX ADMIN — Medication 10 ML: at 21:30

## 2020-01-15 RX ADMIN — METOPROLOL TARTRATE 100 MG: 50 TABLET, FILM COATED ORAL at 08:54

## 2020-01-15 RX ADMIN — CLONIDINE HYDROCHLORIDE 0.2 MG: 0.2 TABLET ORAL at 14:14

## 2020-01-15 RX ADMIN — AMITRIPTYLINE HYDROCHLORIDE 10 MG: 10 TABLET, FILM COATED ORAL at 21:36

## 2020-01-15 RX ADMIN — CLONIDINE HYDROCHLORIDE 0.2 MG: 0.2 TABLET ORAL at 08:55

## 2020-01-15 RX ADMIN — CALCIUM ACETATE 667 MG: 667 CAPSULE ORAL at 08:53

## 2020-01-15 RX ADMIN — DARBEPOETIN ALFA 100 MCG: 100 INJECTION, SOLUTION INTRAVENOUS; SUBCUTANEOUS at 13:02

## 2020-01-15 RX ADMIN — LORAZEPAM 0.5 MG: 0.5 TABLET ORAL at 18:26

## 2020-01-15 RX ADMIN — INSULIN LISPRO 3 UNITS: 100 INJECTION, SOLUTION INTRAVENOUS; SUBCUTANEOUS at 08:00

## 2020-01-15 RX ADMIN — ACETAMINOPHEN 650 MG: 325 TABLET, FILM COATED ORAL at 21:36

## 2020-01-15 RX ADMIN — SODIUM CHLORIDE 20 ML: 9 INJECTION, SOLUTION INTRAVENOUS at 13:03

## 2020-01-15 RX ADMIN — CLONIDINE HYDROCHLORIDE 0.2 MG: 0.2 TABLET ORAL at 21:36

## 2020-01-15 RX ADMIN — INSULIN LISPRO 5 UNITS: 100 INJECTION, SOLUTION INTRAVENOUS; SUBCUTANEOUS at 12:39

## 2020-01-15 RX ADMIN — AZITHROMYCIN MONOHYDRATE 500 MG: 500 INJECTION, POWDER, LYOPHILIZED, FOR SOLUTION INTRAVENOUS at 17:25

## 2020-01-15 RX ADMIN — PANTOPRAZOLE SODIUM 40 MG: 40 TABLET, DELAYED RELEASE ORAL at 05:21

## 2020-01-15 RX ADMIN — AMLODIPINE BESYLATE 5 MG: 5 TABLET ORAL at 08:54

## 2020-01-15 RX ADMIN — CALCIUM ACETATE 667 MG: 667 CAPSULE ORAL at 17:33

## 2020-01-15 RX ADMIN — SODIUM CHLORIDE 20 ML: 9 INJECTION, SOLUTION INTRAVENOUS at 14:00

## 2020-01-15 RX ADMIN — PIPERACILLIN AND TAZOBACTAM 3.38 G: 3; .375 INJECTION, POWDER, FOR SOLUTION INTRAVENOUS at 17:21

## 2020-01-15 RX ADMIN — CALCIUM ACETATE 667 MG: 667 CAPSULE ORAL at 12:36

## 2020-01-15 RX ADMIN — METOPROLOL TARTRATE 100 MG: 50 TABLET, FILM COATED ORAL at 21:35

## 2020-01-15 ASSESSMENT — PAIN DESCRIPTION - LOCATION: LOCATION: GENERALIZED

## 2020-01-15 ASSESSMENT — PAIN DESCRIPTION - PAIN TYPE: TYPE: ACUTE PAIN

## 2020-01-15 ASSESSMENT — PAIN SCALES - GENERAL
PAINLEVEL_OUTOF10: 7
PAINLEVEL_OUTOF10: 8
PAINLEVEL_OUTOF10: 0

## 2020-01-15 NOTE — CONSULTS
Types: Cigarettes     Last attempt to quit: 2019     Years since quittin.7    Smokeless tobacco: Current User   Substance and Sexual Activity    Alcohol use: No    Drug use: No     Comment: documented prior history of opioid abuse    Sexual activity: Yes     Partners: Male   Lifestyle    Physical activity:     Days per week: Not on file     Minutes per session: Not on file    Stress: Not on file   Relationships    Social connections:     Talks on phone: Not on file     Gets together: Not on file     Attends Jain service: Not on file     Active member of club or organization: Not on file     Attends meetings of clubs or organizations: Not on file     Relationship status: Not on file    Intimate partner violence:     Fear of current or ex partner: Not on file     Emotionally abused: Not on file     Physically abused: Not on file     Forced sexual activity: Not on file   Other Topics Concern    Not on file   Social History Narrative    Not on file       MEDICAL HISTORY:  Past Medical History:   Diagnosis Date    BC (acute kidney injury) ([facility] [address] 10/1/2019    Cephalgia 10/9/2019    Chronic kidney disease     Depression     Diabetes mellitus ([facility] [address]     Diabetic ketoacidosis ([facility] [address] 2011    Iron deficiency anemia 10/1/2019    MDRO (multiple drug resistant organisms) resistance     MRSA (methicillin resistant Staphylococcus aureus)     back wound abcess    Non compliance w medication regimen 3/30/2016    Other disorders of kidney and ureter     Pregnancy 3/30/2016    16 weeks    Severe pre-eclampsia in third trimester 2016       MEDICATIONS:   azithromycin  500 mg Intravenous Q24H    piperacillin-tazobactam  3.375 g Intravenous Q8H    sodium chloride flush  10 mL Intravenous 2 times per day    atorvastatin  40 mg Oral Nightly    aspirin  81 mg Oral Daily    heparin (porcine)  5,000 Units Subcutaneous 3 times per day    amitriptyline  10 mg Oral Nightly    amLODIPine  5 mg Oral Daily    calcium acetate  667 mg Oral TID     [Held by provider] isosorbide mononitrate  30 mg Oral Daily    insulin glargine  6 Units Subcutaneous Nightly    insulin lispro  0-6 Units Subcutaneous TID     insulin lispro  0-3 Units Subcutaneous Nightly    insulin lispro  3 Units Subcutaneous TID     metoprolol  100 mg Oral BID    cloNIDine  0.2 mg Oral TID    pantoprazole  40 mg Oral QAM AC    [START ON 1/16/2020] vitamin D  50,000 Units Oral Once per day on Mon Thu      sodium chloride      sodium chloride Stopped (01/13/20 0159)    dextrose       levalbuterol, sodium chloride flush, magnesium hydroxide, ondansetron, nitroGLYCERIN, LORazepam, glucose, dextrose, glucagon (rDNA), dextrose, perflutren lipid microspheres, calcium carbonate    REVIEW OF SYSTEMS:  Constitutional: Denies fever, weight loss, night sweats, and fatigue  Skin: Denies pigmentation, dark lesions, and rashes   HEENT: Denies hearing loss, tinnitus, ear drainage, epistaxis, sore throat, and hoarseness. Cardiovascular: Denies palpitations, chest pain, and chest pressure. Respiratory: Denies cough, dyspnea at rest, hemoptysis, apnea, and choking.   Gastrointestinal: Denies nausea, vomiting, poor appetite, diarrhea, heartburn or reflux  Genitourinary: Denies dysuria, frequency, urgency or hematuria  Musculoskeletal: Denies myalgias, muscle weakness, and bone pain  Neurological: Denies dizziness, vertigo, headache, and focal weakness  Psychological: Denies anxiety and depression  Endocrine: Denies heat intolerance and cold intolerance  Hematopoietic/Lymphatic: Denies bleeding problems and blood transfusions    PHYSICAL EXAM:  Vitals:    01/15/20 1654   BP: 132/85   Pulse: 92   Resp: 18   Temp: 98.1 °F (36.7 °C)   SpO2: 99%           O2 Device: None (Room air)    Constitutional: No fever, chills, diaphoresis  Skin: Skin rash, no skin breakdown  HEENT: Unremarkable  Neck: No JVD, lymphadenopathy, thyromegaly  Cardiovascular: S1, S2 normal.  No S3-S4 murmurs rubs present  Respiratory: Fine interstitial type crackles over both posterior lung fields  Gastrointestinal: Soft, flat, nontender  Genitourinary: No CVA tenderness  Extremities: No clubbing, cyanosis, or edema  Neurological: Awake, alert, oriented x3.   No evidence of focal motor or sensory deficits  Psychological: Depressed affect    LABS:  WBC   Date Value Ref Range Status   01/15/2020 6.6 4.5 - 11.5 E9/L Final   01/14/2020 7.3 4.5 - 11.5 E9/L Final   01/13/2020 10.9 4.5 - 11.5 E9/L Final     Hemoglobin   Date Value Ref Range Status   01/15/2020 7.6 (L) 11.5 - 15.5 g/dL Final   01/14/2020 8.3 (L) 11.5 - 15.5 g/dL Final   01/13/2020 9.6 (L) 11.5 - 15.5 g/dL Final     Hematocrit   Date Value Ref Range Status   01/15/2020 23.7 (L) 34.0 - 48.0 % Final   01/14/2020 25.7 (L) 34.0 - 48.0 % Final   01/13/2020 30.3 (L) 34.0 - 48.0 % Final     MCV   Date Value Ref Range Status   01/15/2020 88.8 80.0 - 99.9 fL Final   01/14/2020 89.2 80.0 - 99.9 fL Final   01/13/2020 89.9 80.0 - 99.9 fL Final     Platelets   Date Value Ref Range Status   01/15/2020 213 130 - 450 E9/L Final   01/14/2020 219 130 - 450 E9/L Final   01/13/2020 282 130 - 450 E9/L Final     Sodium   Date Value Ref Range Status   01/15/2020 138 132 - 146 mmol/L Final   01/14/2020 141 132 - 146 mmol/L Final   01/13/2020 142 132 - 146 mmol/L Final     Potassium   Date Value Ref Range Status   01/15/2020 3.8 3.5 - 5.0 mmol/L Final   01/14/2020 3.6 3.5 - 5.0 mmol/L Final   11/19/2019 4.1 3.5 - 5.0 mmol/L Final     Potassium reflex Magnesium   Date Value Ref Range Status   01/13/2020 4.0 3.5 - 5.0 mmol/L Final   01/12/2020 3.5 3.5 - 5.0 mmol/L Final   10/04/2019 4.2 3.5 - 5.0 mmol/L Final     Chloride   Date Value Ref Range Status   01/15/2020 106 98 - 107 mmol/L Final   01/14/2020 107 98 - 107 mmol/L Final   01/13/2020 107 98 - 107 mmol/L Final     CO2   Date Value Ref Range Status   01/15/2020 26 22 - 29 mmol/L Final   01/14/2020 23 22 - U/L Final     GFR Non-   Date Value Ref Range Status   01/15/2020 31 >=60 mL/min/1.73 Final     Comment:     Chronic Kidney Disease: less than 60 ml/min/1.73 sq.m. Kidney Failure: less than 15 ml/min/1.73 sq.m. Results valid for patients 18 years and older. 01/14/2020 31 >=60 mL/min/1.73 Final     Comment:     Chronic Kidney Disease: less than 60 ml/min/1.73 sq.m. Kidney Failure: less than 15 ml/min/1.73 sq.m. Results valid for patients 18 years and older. 01/13/2020 27 >=60 mL/min/1.73 Final     Comment:     Chronic Kidney Disease: less than 60 ml/min/1.73 sq.m. Kidney Failure: less than 15 ml/min/1.73 sq.m. Results valid for patients 18 years and older. GFR    Date Value Ref Range Status   01/15/2020 31  Final   01/14/2020 31  Final   01/13/2020 27  Final     Magnesium   Date Value Ref Range Status   01/15/2020 2.0 1.6 - 2.6 mg/dL Final   01/14/2020 1.5 (L) 1.6 - 2.6 mg/dL Final   01/12/2020 1.9 1.6 - 2.6 mg/dL Final     Phosphorus   Date Value Ref Range Status   01/15/2020 2.4 (L) 2.5 - 4.5 mg/dL Final   01/14/2020 3.1 2.5 - 4.5 mg/dL Final   11/04/2019 3.5 2.5 - 4.5 mg/dL Final     No results for input(s): PH, PO2, PCO2, HCO3, BE, O2SAT in the last 72 hours. RADIOLOGY:  CT CHEST HIGH RESOLUTION   Final Result   1. Bibasilar groundglass opacities without parapneumonic effusion. Groundglass opacities are a nonspecific finding and can be the result   of a variety of causes involving pneumonia. NM LUNG VENT/PERFUSION (VQ)   Final Result   Unremarkable ventilation-perfusion study. XR CHEST STANDARD (2 VW)   Final Result   No acute cardiopulmonary disease. US DUP LOWER EXTREMITIES BILATERAL VENOUS   Final Result   No sonographic evidence of bilateral lower extremity deep   venous thrombosis. XR CHEST 1 VW   Final Result   No acute cardiopulmonary disease. IMPRESSION:  1.  Possible community-acquired

## 2020-01-15 NOTE — PROGRESS NOTES
Orthostatic hypotension, improved. PLAN:  1. No further cardiac work up indicated at this time. 2. Continue current cardiac medications the same. 3. We will sign off at this time. Call with any questions or concerns.            Electronically signed by Janny Davis, DO on 1/15/2020 at 12:39 PM

## 2020-01-15 NOTE — PROGRESS NOTES
Department of Internal Medicine  Nephrology Attending Progress Note        SUBJECTIVE:  We are following this patient for acute kidney injury on chronic kidney disease stage 4. The patient very tired. Short of breath. No appetite. Physical Exam:    VITALS:  /84   Pulse 84   Temp 97.9 °F (36.6 °C) (Oral)   Resp 18   Ht 5' 4\" (1.626 m)   Wt 145 lb 11.2 oz (66.1 kg)   LMP 11/16/2019 (Approximate)   SpO2 99%   BMI 25.01 kg/m²   24HR INTAKE/OUTPUT:    Intake/Output Summary (Last 24 hours) at 1/15/2020 0953  Last data filed at 1/15/2020 5676  Gross per 24 hour   Intake 960.83 ml   Output 1000 ml   Net -39.17 ml       Constitutional:  Well built. Fairly nourished. Mild distress. Access Exam:  none   Cardiovascular/Edema:  Heart sounds normal. No murmur. Respiratory:  Breath sounds normal. Few rhonchi. Gastrointestinal:  Bowel sounds normal. No organomegaly. Other:  No edema. A/ox 3. No focal deficit  BEERLA. JVD not seen. Aneita Luz DATA:    CBC:   Lab Results   Component Value Date    WBC 6.6 01/15/2020    RBC 2.67 01/15/2020    HGB 7.6 01/15/2020    HCT 23.7 01/15/2020    MCV 88.8 01/15/2020    MCH 28.5 01/15/2020    MCHC 32.1 01/15/2020    RDW 15.0 01/15/2020     01/15/2020    MPV 11.2 01/15/2020     CMP:    Lab Results   Component Value Date     01/15/2020    K 3.8 01/15/2020    K 4.0 01/13/2020     01/15/2020    CO2 26 01/15/2020    BUN 31 01/15/2020    CREATININE 2.3 01/15/2020    GFRAA 31 01/15/2020    LABGLOM 31 01/15/2020    GLUCOSE 117 01/15/2020    GLUCOSE 130 05/18/2012    PROT 5.0 01/15/2020    LABALBU 2.4 01/15/2020    LABALBU 4.1 05/18/2012    CALCIUM 7.9 01/15/2020    BILITOT <0.2 01/15/2020    ALKPHOS 115 01/15/2020    AST 17 01/15/2020    ALT 8 01/15/2020       IMPRESSION/RECOMMENDATIONS:      Acute kidney injury. Pre renal.  CKD stage 4. DM and chronic interstitial nephritis. Sever anemia. OF CKD stage. 4  Metabolic acidosis. - Stop IV fluid. - 1 unit PRBC.   - Start Aranesp. - Check Iron stores.   - Discussed with Dr Miko Casey and team. No

## 2020-01-15 NOTE — PROGRESS NOTES
[address] [place], [place] [address]ist   Progress Note    Admitting Date and Time: 1/12/2020 10:51 PM  Admit Dx: Chest pain, unspecified [R07.9]  Acute respiratory failure with hypoxia ([facility] [address] [J96.01]    Subjective:    Patient reports continued shortness of breath. ROS: denies fever, chills, cp, sob, n/v, HA unless stated above.      azithromycin  500 mg Intravenous Q24H    piperacillin-tazobactam  3.375 g Intravenous Q8H    sodium chloride flush  10 mL Intravenous 2 times per day    atorvastatin  40 mg Oral Nightly    aspirin  81 mg Oral Daily    heparin (porcine)  5,000 Units Subcutaneous 3 times per day    amitriptyline  10 mg Oral Nightly    amLODIPine  5 mg Oral Daily    calcium acetate  667 mg Oral TID WC    [Held by provider] isosorbide mononitrate  30 mg Oral Daily    insulin glargine  6 Units Subcutaneous Nightly    insulin lispro  0-6 Units Subcutaneous TID WC    insulin lispro  0-3 Units Subcutaneous Nightly    insulin lispro  3 Units Subcutaneous TID WC    metoprolol  100 mg Oral BID    cloNIDine  0.2 mg Oral TID    pantoprazole  40 mg Oral QAM AC    [START ON 1/16/2020] vitamin D  50,000 Units Oral Once per day on Mon Thu     levalbuterol, 1.25 mg, Q8H PRN  sodium chloride flush, 10 mL, PRN  magnesium hydroxide, 30 mL, Daily PRN  ondansetron, 4 mg, Q6H PRN  nitroGLYCERIN, 0.4 mg, Q5 Min PRN  LORazepam, 0.5 mg, BID PRN  glucose, 15 g, PRN  dextrose, 12.5 g, PRN  glucagon (rDNA), 1 mg, PRN  dextrose, 100 mL/hr, PRN  perflutren lipid microspheres, 1.5 mL, ONCE PRN  calcium carbonate, 500 mg, TID PRN         Objective:    /85   Pulse 92   Temp 98.1 °F (36.7 °C)   Resp 18   Ht 5' 4\" (1.626 m)   Wt 145 lb 11.2 oz (66.1 kg)   LMP 11/16/2019 (Approximate)   SpO2 99%   BMI 25.01 kg/m²   General Appearance: alert and oriented to person, place and time and in no acute distress  Skin: warm and dry  Head: normocephalic and atraumatic  Eyes: pupils equal, round, and reactive to hypotension  Resolved Problems:    * No resolved hospital problems. *      Plan:  1. Shortness of breath:  Echo on 10/30/19 revealed an EF of 55%. D dimer was 281. Unable to have CTA of the chest due to BC. VQ scan negative. Repeat Echo ordered by Cardiology. Rapid flu negative. Respiratory array panel negative. Pulmonology following. Repeat echo on 1/13/2020 showed an EF of 60%. 2. Chest pain:  Heart score 4. Troponin 0.02 EKG with T wave inversions in inferior and lateral leads. Troponin 0.02 -> 0.01 -> <0.01. Cardiology evaluated and no further cardiac work-up at this time. Cardiology has signed off. 3. Community acquired pneumonia vs aspiration:  Started on azithromycin and Zosyn empirically by Pulmonology. Chest x-ray ordered in the morning  4. Tachycardia: Heart rate currently controlled. Continue metoprolol. 5. Orthostatic hypotension:   Hold Imdur. Last set of orthoses were negative. 6. Acute kidney injury on chronic kidney disease: Creatinine was 3.2 on admission and is now 2.3 with IV fluids. 7. Metabolic acidosis:   Resolved. 8. Poorly controlled diabetes:  Hgb A1C is 9.8 (8.8 on 10/2/19). Continue Insulin sliding scale with blood sugar checks, hypoglycemic protocol. 9. Gastroparesis:  EGD on 10/11/19 showed a large amount of solid food in the stomach. Gastric emptying studying on 10/12/19 with significantly delayed gastric emptying with persistent uptake within the esophagus. 10. Hypertension:  Continue home medications, except Imdur due to orthostatic hypotension. 11. Vitamin D deficiency: Vitamin D level is <5. Started on Vitamin D 21260 units weekly. 12. Tobacco use:  Smoking cessation education. 13. Anemia:  Iron studies done. EGD done on 10/11/19 showed no fresh or active bleeding. Received one unit of PRBCs today. Started on Aranesp  14. Diarrhea: Check stool for C. difficile and stool culture.       NOTE: This report was transcribed using voice recognition software.  Every effort was made to ensure accuracy; however, inadvertent computerized transcription errors may be present.     Electronically signed by HEBER Lawson CNP on 1/15/2020 at 5:18 PM

## 2020-01-15 NOTE — PROGRESS NOTES
P Quality Flow/Interdisciplinary Rounds Progress Note        Quality Flow Rounds held on January 15, 2020    Disciplines Attending:  Bedside Nurse, ,  and Nursing Unit Leadership    Jessica Diaz was admitted on 1/12/2020 10:51 PM    Anticipated Discharge Date:  Expected Discharge Date: 01/20/20    Disposition:    Benedicto Score:  Benedicto Scale Score: 23    Readmission Risk              Risk of Unplanned Readmission:        56           Discussed patient goal for the day, patient clinical progression, and barriers to discharge.   The following Goal(s) of the Day/Commitment(s) have been identified:  Port, activity progression, from Amarilis Fish RA, elicia Guzman  January 15, 2020

## 2020-01-15 NOTE — PLAN OF CARE
Problem: Pain:  Description  Pain management should include both nonpharmacologic and pharmacologic interventions.   Goal: Pain level will decrease  Description  Pain level will decrease  Outcome: Met This Shift     Problem: Falls - Risk of:  Goal: Will remain free from falls  Description  Will remain free from falls  1/15/2020 0408 by Chasity Garcia RN  Outcome: Met This Shift    Goal: Absence of physical injury  Description  Absence of physical injury  1/15/2020 0408 by Chasity Garcia RN  Outcome: Met This Shift

## 2020-01-16 ENCOUNTER — APPOINTMENT (OUTPATIENT)
Dept: GENERAL RADIOLOGY | Age: 30
DRG: 469 | End: 2020-01-16
Payer: COMMERCIAL

## 2020-01-16 LAB
ANION GAP SERPL CALCULATED.3IONS-SCNC: 6 MMOL/L (ref 7–16)
BUN BLDV-MCNC: 38 MG/DL (ref 6–20)
C DIFF TOXIN/ANTIGEN: NORMAL
C. DIFFICILE TOXIN MOLECULAR: ABNORMAL
CALCIUM SERPL-MCNC: 7.5 MG/DL (ref 8.6–10.2)
CHLORIDE BLD-SCNC: 109 MMOL/L (ref 98–107)
CO2: 23 MMOL/L (ref 22–29)
CREAT SERPL-MCNC: 2.5 MG/DL (ref 0.5–1)
GFR AFRICAN AMERICAN: 28
GFR NON-AFRICAN AMERICAN: 28 ML/MIN/1.73
GLUCOSE BLD-MCNC: 72 MG/DL (ref 74–99)
HCT VFR BLD CALC: 26.6 % (ref 34–48)
HEMOGLOBIN: 8.6 G/DL (ref 11.5–15.5)
MCH RBC QN AUTO: 28.9 PG (ref 26–35)
MCHC RBC AUTO-ENTMCNC: 32.3 % (ref 32–34.5)
MCV RBC AUTO: 89.3 FL (ref 80–99.9)
METER GLUCOSE: 125 MG/DL (ref 74–99)
METER GLUCOSE: 156 MG/DL (ref 74–99)
METER GLUCOSE: 216 MG/DL (ref 74–99)
METER GLUCOSE: 72 MG/DL (ref 74–99)
ORGANISM: ABNORMAL
PDW BLD-RTO: 14.7 FL (ref 11.5–15)
PLATELET # BLD: 195 E9/L (ref 130–450)
PMV BLD AUTO: 10.8 FL (ref 7–12)
POTASSIUM SERPL-SCNC: 4.7 MMOL/L (ref 3.5–5)
RBC # BLD: 2.98 E12/L (ref 3.5–5.5)
SODIUM BLD-SCNC: 138 MMOL/L (ref 132–146)
URINE CULTURE, ROUTINE: NORMAL
WBC # BLD: 7.6 E9/L (ref 4.5–11.5)

## 2020-01-16 PROCEDURE — 1200000000 HC SEMI PRIVATE

## 2020-01-16 PROCEDURE — 6370000000 HC RX 637 (ALT 250 FOR IP): Performed by: NURSE PRACTITIONER

## 2020-01-16 PROCEDURE — 6360000002 HC RX W HCPCS: Performed by: INTERNAL MEDICINE

## 2020-01-16 PROCEDURE — 99233 SBSQ HOSP IP/OBS HIGH 50: CPT | Performed by: INTERNAL MEDICINE

## 2020-01-16 PROCEDURE — 85027 COMPLETE CBC AUTOMATED: CPT

## 2020-01-16 PROCEDURE — 71046 X-RAY EXAM CHEST 2 VIEWS: CPT

## 2020-01-16 PROCEDURE — 2580000003 HC RX 258: Performed by: NURSE PRACTITIONER

## 2020-01-16 PROCEDURE — 2580000003 HC RX 258: Performed by: INTERNAL MEDICINE

## 2020-01-16 PROCEDURE — 82962 GLUCOSE BLOOD TEST: CPT

## 2020-01-16 PROCEDURE — 36415 COLL VENOUS BLD VENIPUNCTURE: CPT

## 2020-01-16 PROCEDURE — 80048 BASIC METABOLIC PNL TOTAL CA: CPT

## 2020-01-16 PROCEDURE — APPSS30 APP SPLIT SHARED TIME 16-30 MINUTES: Performed by: NURSE PRACTITIONER

## 2020-01-16 RX ORDER — HYDROCODONE BITARTRATE AND ACETAMINOPHEN 5; 325 MG/1; MG/1
2 TABLET ORAL EVERY 4 HOURS PRN
Status: DISCONTINUED | OUTPATIENT
Start: 2020-01-16 | End: 2020-01-20 | Stop reason: HOSPADM

## 2020-01-16 RX ORDER — AZITHROMYCIN 250 MG/1
250 TABLET, FILM COATED ORAL DAILY
Status: DISCONTINUED | OUTPATIENT
Start: 2020-01-17 | End: 2020-01-20 | Stop reason: HOSPADM

## 2020-01-16 RX ORDER — HYDROCODONE BITARTRATE AND ACETAMINOPHEN 5; 325 MG/1; MG/1
1 TABLET ORAL EVERY 4 HOURS PRN
Status: DISCONTINUED | OUTPATIENT
Start: 2020-01-16 | End: 2020-01-20 | Stop reason: HOSPADM

## 2020-01-16 RX ADMIN — CALCIUM ACETATE 667 MG: 667 CAPSULE ORAL at 08:25

## 2020-01-16 RX ADMIN — AMITRIPTYLINE HYDROCHLORIDE 10 MG: 10 TABLET, FILM COATED ORAL at 21:09

## 2020-01-16 RX ADMIN — ERGOCALCIFEROL 50000 UNITS: 1.25 CAPSULE ORAL at 08:25

## 2020-01-16 RX ADMIN — INSULIN GLARGINE 6 UNITS: 100 INJECTION, SOLUTION SUBCUTANEOUS at 21:18

## 2020-01-16 RX ADMIN — PANTOPRAZOLE SODIUM 40 MG: 40 TABLET, DELAYED RELEASE ORAL at 05:42

## 2020-01-16 RX ADMIN — METOPROLOL TARTRATE 100 MG: 50 TABLET, FILM COATED ORAL at 21:12

## 2020-01-16 RX ADMIN — ATORVASTATIN CALCIUM 40 MG: 40 TABLET, FILM COATED ORAL at 21:09

## 2020-01-16 RX ADMIN — METOPROLOL TARTRATE 100 MG: 50 TABLET, FILM COATED ORAL at 08:24

## 2020-01-16 RX ADMIN — HYDROCODONE BITARTRATE AND ACETAMINOPHEN 2 TABLET: 5; 325 TABLET ORAL at 21:09

## 2020-01-16 RX ADMIN — INSULIN LISPRO 3 UNITS: 100 INJECTION, SOLUTION INTRAVENOUS; SUBCUTANEOUS at 12:29

## 2020-01-16 RX ADMIN — PIPERACILLIN AND TAZOBACTAM 3.38 G: 3; .375 INJECTION, POWDER, FOR SOLUTION INTRAVENOUS at 16:46

## 2020-01-16 RX ADMIN — Medication 10 ML: at 10:08

## 2020-01-16 RX ADMIN — ASPIRIN 81 MG 81 MG: 81 TABLET ORAL at 08:25

## 2020-01-16 RX ADMIN — AMLODIPINE BESYLATE 5 MG: 5 TABLET ORAL at 08:25

## 2020-01-16 RX ADMIN — PIPERACILLIN AND TAZOBACTAM 3.38 G: 3; .375 INJECTION, POWDER, FOR SOLUTION INTRAVENOUS at 00:24

## 2020-01-16 RX ADMIN — INSULIN LISPRO 2 UNITS: 100 INJECTION, SOLUTION INTRAVENOUS; SUBCUTANEOUS at 17:31

## 2020-01-16 RX ADMIN — LORAZEPAM 0.5 MG: 0.5 TABLET ORAL at 00:24

## 2020-01-16 RX ADMIN — INSULIN LISPRO 1 UNITS: 100 INJECTION, SOLUTION INTRAVENOUS; SUBCUTANEOUS at 21:17

## 2020-01-16 RX ADMIN — CALCIUM ACETATE 667 MG: 667 CAPSULE ORAL at 12:28

## 2020-01-16 RX ADMIN — Medication 125 MG: at 19:40

## 2020-01-16 RX ADMIN — INSULIN LISPRO 3 UNITS: 100 INJECTION, SOLUTION INTRAVENOUS; SUBCUTANEOUS at 10:12

## 2020-01-16 RX ADMIN — AZITHROMYCIN MONOHYDRATE 500 MG: 500 INJECTION, POWDER, LYOPHILIZED, FOR SOLUTION INTRAVENOUS at 16:49

## 2020-01-16 RX ADMIN — CALCIUM ACETATE 667 MG: 667 CAPSULE ORAL at 16:53

## 2020-01-16 RX ADMIN — CLONIDINE HYDROCHLORIDE 0.2 MG: 0.2 TABLET ORAL at 08:25

## 2020-01-16 RX ADMIN — HYDROCODONE BITARTRATE AND ACETAMINOPHEN 2 TABLET: 5; 325 TABLET ORAL at 16:53

## 2020-01-16 RX ADMIN — Medication 125 MG: at 23:34

## 2020-01-16 RX ADMIN — CLONIDINE HYDROCHLORIDE 0.2 MG: 0.2 TABLET ORAL at 14:07

## 2020-01-16 RX ADMIN — INSULIN LISPRO 3 UNITS: 100 INJECTION, SOLUTION INTRAVENOUS; SUBCUTANEOUS at 17:35

## 2020-01-16 RX ADMIN — CLONIDINE HYDROCHLORIDE 0.2 MG: 0.2 TABLET ORAL at 21:09

## 2020-01-16 RX ADMIN — LORAZEPAM 0.5 MG: 0.5 TABLET ORAL at 21:09

## 2020-01-16 RX ADMIN — PIPERACILLIN AND TAZOBACTAM 3.38 G: 3; .375 INJECTION, POWDER, FOR SOLUTION INTRAVENOUS at 10:08

## 2020-01-16 ASSESSMENT — PAIN SCALES - GENERAL
PAINLEVEL_OUTOF10: 0
PAINLEVEL_OUTOF10: 8
PAINLEVEL_OUTOF10: 8
PAINLEVEL_OUTOF10: 4
PAINLEVEL_OUTOF10: 9

## 2020-01-16 ASSESSMENT — PAIN - FUNCTIONAL ASSESSMENT: PAIN_FUNCTIONAL_ASSESSMENT: ACTIVITIES ARE NOT PREVENTED

## 2020-01-16 ASSESSMENT — PAIN DESCRIPTION - PAIN TYPE
TYPE: ACUTE PAIN
TYPE: ACUTE PAIN

## 2020-01-16 ASSESSMENT — PAIN DESCRIPTION - LOCATION
LOCATION: GENERALIZED
LOCATION: ABDOMEN;GENERALIZED

## 2020-01-16 ASSESSMENT — PAIN DESCRIPTION - DESCRIPTORS: DESCRIPTORS: ACHING

## 2020-01-16 NOTE — PROGRESS NOTES
[facility] Quality Flow/Interdisciplinary Rounds Progress Note        Quality Flow Rounds held on January 16, 2020    Disciplines Attending:  Bedside Nurse, ,  and Nursing Unit Leadership    Sherri Handy was admitted on 1/12/2020 10:51 PM    Anticipated Discharge Date:  Expected Discharge Date: 01/20/20    Disposition:    Benedicto Score:  Benedicto Scale Score: 23    Readmission Risk              Risk of Unplanned Readmission:        57           Discussed patient goal for the day, patient clinical progression, and barriers to discharge.   The following Goal(s) of the Day/Commitment(s) have been identified:  Activity progression,       José Antonio Push  January 16, 2020

## 2020-01-16 NOTE — PROGRESS NOTES
Pulmonary/Critical Care Progress Note    We are following patient for shortness of breath with exertion, bilateral lung infiltrates, now with C. difficile infection, diabetes mellitus type 1, diabetic gastropathy    SUBJECTIVE:  The patient has been having diarrhea and has a stool that is now positive for C. difficile. I have stopped her Zosyn. She is on oral vancomycin right now. She is not having any great difficulty with her breathing and is not coughing or wheezing at the moment. MEDICATIONS:   vancomycin  125 mg Oral 4 times per day    sodium chloride flush  10 mL Intravenous 2 times per day    atorvastatin  40 mg Oral Nightly    aspirin  81 mg Oral Daily    heparin (porcine)  5,000 Units Subcutaneous 3 times per day    amitriptyline  10 mg Oral Nightly    amLODIPine  5 mg Oral Daily    calcium acetate  667 mg Oral TID WC    [Held by provider] isosorbide mononitrate  30 mg Oral Daily    insulin glargine  6 Units Subcutaneous Nightly    insulin lispro  0-6 Units Subcutaneous TID WC    insulin lispro  0-3 Units Subcutaneous Nightly    insulin lispro  3 Units Subcutaneous TID WC    metoprolol  100 mg Oral BID    cloNIDine  0.2 mg Oral TID    pantoprazole  40 mg Oral QAM AC    vitamin D  50,000 Units Oral Once per day on Mon Thu      sodium chloride Stopped (01/13/20 0159)    dextrose       HYDROcodone 5 mg - acetaminophen **OR** HYDROcodone 5 mg - acetaminophen, acetaminophen, levalbuterol, sodium chloride flush, magnesium hydroxide, ondansetron, nitroGLYCERIN, LORazepam, glucose, dextrose, glucagon (rDNA), dextrose, perflutren lipid microspheres, calcium carbonate      REVIEW OF SYSTEMS:  Constitutional: Denies fever, weight loss, night sweats, and fatigue  Skin: Denies pigmentation, dark lesions, and rashes   HEENT: Denies hearing loss, tinnitus, ear drainage, epistaxis, sore throat, and hoarseness. Cardiovascular: Denies palpitations, chest pain, and chest pressure.   Respiratory: 219 130 - 450 E9/L Final     Sodium   Date Value Ref Range Status   01/16/2020 138 132 - 146 mmol/L Final   01/15/2020 138 132 - 146 mmol/L Final   01/14/2020 141 132 - 146 mmol/L Final     Potassium   Date Value Ref Range Status   01/16/2020 4.7 3.5 - 5.0 mmol/L Final   01/15/2020 3.8 3.5 - 5.0 mmol/L Final   01/14/2020 3.6 3.5 - 5.0 mmol/L Final     Potassium reflex Magnesium   Date Value Ref Range Status   01/13/2020 4.0 3.5 - 5.0 mmol/L Final   01/12/2020 3.5 3.5 - 5.0 mmol/L Final   10/04/2019 4.2 3.5 - 5.0 mmol/L Final     Chloride   Date Value Ref Range Status   01/16/2020 109 (H) 98 - 107 mmol/L Final   01/15/2020 106 98 - 107 mmol/L Final   01/14/2020 107 98 - 107 mmol/L Final     CO2   Date Value Ref Range Status   01/16/2020 23 22 - 29 mmol/L Final   01/15/2020 26 22 - 29 mmol/L Final   01/14/2020 23 22 - 29 mmol/L Final     BUN   Date Value Ref Range Status   01/16/2020 38 (H) 6 - 20 mg/dL Final   01/15/2020 31 (H) 6 - 20 mg/dL Final   01/14/2020 30 (H) 6 - 20 mg/dL Final     CREATININE   Date Value Ref Range Status   01/16/2020 2.5 (H) 0.5 - 1.0 mg/dL Final   01/15/2020 2.3 (H) 0.5 - 1.0 mg/dL Final   01/14/2020 2.3 (H) 0.5 - 1.0 mg/dL Final     Glucose   Date Value Ref Range Status   01/16/2020 72 (L) 74 - 99 mg/dL Final   01/15/2020 117 (H) 74 - 99 mg/dL Final   01/14/2020 34 (LL) 74 - 99 mg/dL Final   05/18/2012 130 (H) 70 - 110 mg/dL Final   04/26/2012 61 (L) 70 - 110 mg/dL Final   04/25/2012 196 (H) 70 - 110 mg/dL Final     Calcium   Date Value Ref Range Status   01/16/2020 7.5 (L) 8.6 - 10.2 mg/dL Final   01/15/2020 7.9 (L) 8.6 - 10.2 mg/dL Final   01/14/2020 8.0 (L) 8.6 - 10.2 mg/dL Final     Total Protein   Date Value Ref Range Status   01/15/2020 5.0 (L) 6.4 - 8.3 g/dL Final   01/14/2020 5.4 (L) 6.4 - 8.3 g/dL Final   01/12/2020 7.4 6.4 - 8.3 g/dL Final     Albumin   Date Value Ref Range Status   05/18/2012 4.1 3.2 - 4.8 g/dL Final   04/23/2012 4.0 3.2 - 4.8 g/dL Final   04/02/2012 4.8 3.2 - 4.8 g/dL Final     Alb   Date Value Ref Range Status   01/15/2020 2.4 (L) 3.5 - 5.2 g/dL Final   01/14/2020 2.5 (L) 3.5 - 5.2 g/dL Final   01/12/2020 3.2 (L) 3.5 - 5.2 g/dL Final     Total Bilirubin   Date Value Ref Range Status   01/15/2020 <0.2 0.0 - 1.2 mg/dL Final   01/14/2020 <0.2 0.0 - 1.2 mg/dL Final   01/12/2020 <0.2 0.0 - 1.2 mg/dL Final     Alkaline Phosphatase   Date Value Ref Range Status   01/15/2020 115 (H) 35 - 104 U/L Final   01/14/2020 122 (H) 35 - 104 U/L Final   01/12/2020 166 (H) 35 - 104 U/L Final     AST   Date Value Ref Range Status   01/15/2020 17 0 - 31 U/L Final   01/14/2020 11 0 - 31 U/L Final   01/12/2020 14 0 - 31 U/L Final     ALT   Date Value Ref Range Status   01/15/2020 8 0 - 32 U/L Final   01/14/2020 7 0 - 32 U/L Final   01/12/2020 11 0 - 32 U/L Final     GFR Non-   Date Value Ref Range Status   01/16/2020 28 >=60 mL/min/1.73 Final     Comment:     Chronic Kidney Disease: less than 60 ml/min/1.73 sq.m. Kidney Failure: less than 15 ml/min/1.73 sq.m. Results valid for patients 18 years and older. 01/15/2020 31 >=60 mL/min/1.73 Final     Comment:     Chronic Kidney Disease: less than 60 ml/min/1.73 sq.m. Kidney Failure: less than 15 ml/min/1.73 sq.m. Results valid for patients 18 years and older. 01/14/2020 31 >=60 mL/min/1.73 Final     Comment:     Chronic Kidney Disease: less than 60 ml/min/1.73 sq.m. Kidney Failure: less than 15 ml/min/1.73 sq.m. Results valid for patients 18 years and older.        GFR    Date Value Ref Range Status   01/16/2020 28  Final   01/15/2020 31  Final   01/14/2020 31  Final     Magnesium   Date Value Ref Range Status   01/15/2020 2.0 1.6 - 2.6 mg/dL Final   01/14/2020 1.5 (L) 1.6 - 2.6 mg/dL Final   01/12/2020 1.9 1.6 - 2.6 mg/dL Final     Phosphorus   Date Value Ref Range Status   01/15/2020 2.4 (L) 2.5 - 4.5 mg/dL Final   01/14/2020 3.1 2.5 - 4.5 mg/dL Final   11/04/2019 3.5 2.5 - 4.5 mg/dL Final     No results for input(s): PH, PO2, PCO2, HCO3, BE, O2SAT in the last 72 hours. RADIOLOGY:  XR CHEST STANDARD (2 VW)   Final Result   No acute cardiopulmonary disease. CT CHEST HIGH RESOLUTION   Final Result   1. Bibasilar groundglass opacities without parapneumonic effusion. Groundglass opacities are a nonspecific finding and can be the result   of a variety of causes involving pneumonia. NM LUNG VENT/PERFUSION (VQ)   Final Result   Unremarkable ventilation-perfusion study. XR CHEST STANDARD (2 VW)   Final Result   No acute cardiopulmonary disease. US DUP LOWER EXTREMITIES BILATERAL VENOUS   Final Result   No sonographic evidence of bilateral lower extremity deep   venous thrombosis. XR CHEST 1 VW   Final Result   No acute cardiopulmonary disease. PROBLEM LIST:  Principal Problem:    Dyspnea on exertion  Active Problems:    Poorly controlled type 1 diabetes mellitus (HCC)    Tobacco use    Essential hypertension    Acute respiratory failure with hypoxia (HCC)    Chest pain, unspecified    Dehydration    Sinus tachycardia    Orthostatic hypotension  Resolved Problems:    * No resolved hospital problems. *      IMPRESSION:  1. Possible pneumonia, improved  2. Diabetes mellitus type 1  3. C. difficile infection    PLAN:  1. Oral vancomycin  2. Will place on azithromycin which is not associated with C. difficile infection; Zosyn has been discontinued. 3. Chest x-ray in a.m.       Electronically signed by Nita Mendez MD on 1/16/2020 at 6:07 PM

## 2020-01-16 NOTE — PROGRESS NOTES
Department of Internal Medicine  Nephrology Attending Progress Note        SUBJECTIVE:  We are following this patient for acute kidney injury on chronic kidney disease stage 4. The patient very tired. Short of breath. No appetite. Physical Exam:    VITALS:  /88   Pulse 78   Temp 98.9 °F (37.2 °C) (Oral)   Resp 18   Ht 5' 4\" (1.626 m)   Wt 136 lb 8 oz (61.9 kg)   LMP 11/16/2019 (Approximate)   SpO2 100%   BMI 23.43 kg/m²   24HR INTAKE/OUTPUT:      Intake/Output Summary (Last 24 hours) at 1/16/2020 1034  Last data filed at 1/16/2020 1023  Gross per 24 hour   Intake 1000 ml   Output --   Net 1000 ml       Constitutional:  Well built. Fairly nourished. Mild distress. Access Exam:  none   Cardiovascular/Edema:  Heart sounds normal. No murmur. Respiratory:  Breath sounds normal. Few rhonchi. Gastrointestinal:  Bowel sounds normal. No organomegaly. Other:  No edema. A/ox 3. No focal deficit  BEERLA. JVD not seen. Esvin Machuca DATA:    CBC:   Lab Results   Component Value Date    WBC 7.6 01/16/2020    RBC 2.98 01/16/2020    HGB 8.6 01/16/2020    HCT 26.6 01/16/2020    MCV 89.3 01/16/2020    MCH 28.9 01/16/2020    MCHC 32.3 01/16/2020    RDW 14.7 01/16/2020     01/16/2020    MPV 10.8 01/16/2020     CMP:    Lab Results   Component Value Date     01/16/2020    K 4.7 01/16/2020    K 4.0 01/13/2020     01/16/2020    CO2 23 01/16/2020    BUN 38 01/16/2020    CREATININE 2.5 01/16/2020    GFRAA 28 01/16/2020    LABGLOM 28 01/16/2020    GLUCOSE 72 01/16/2020    GLUCOSE 130 05/18/2012    PROT 5.0 01/15/2020    LABALBU 2.4 01/15/2020    LABALBU 4.1 05/18/2012    CALCIUM 7.5 01/16/2020    BILITOT <0.2 01/15/2020    ALKPHOS 115 01/15/2020    AST 17 01/15/2020    ALT 8 01/15/2020       IMPRESSION/RECOMMENDATIONS:      Acute kidney injury. Pre renal.  CKD stage 4. DM and chronic interstitial nephritis. Sever anemia. OF CKD stage. 4  Metabolic acidosis. - Stop IV fluid. - 1 unit PRBC. - Start Aranesp.   -

## 2020-01-16 NOTE — PROGRESS NOTES
[address] [place], [place] [address]ist   Progress Note    Admitting Date and Time: 1/12/2020 10:51 PM  Admit Dx: Chest pain, unspecified [R07.9]  Acute respiratory failure with hypoxia ([facility] [address] [J96.01]    Subjective:    Patient complains of diarrhea. She is upset that she is not getting anything for diarrhea. ROS: denies fever, chills, cp, sob, n/v, HA unless stated above.      vancomycin  125 mg Oral 4 times per day    azithromycin  500 mg Intravenous Q24H    piperacillin-tazobactam  3.375 g Intravenous Q8H    sodium chloride flush  10 mL Intravenous 2 times per day    atorvastatin  40 mg Oral Nightly    aspirin  81 mg Oral Daily    heparin (porcine)  5,000 Units Subcutaneous 3 times per day    amitriptyline  10 mg Oral Nightly    amLODIPine  5 mg Oral Daily    calcium acetate  667 mg Oral TID WC    [Held by provider] isosorbide mononitrate  30 mg Oral Daily    insulin glargine  6 Units Subcutaneous Nightly    insulin lispro  0-6 Units Subcutaneous TID WC    insulin lispro  0-3 Units Subcutaneous Nightly    insulin lispro  3 Units Subcutaneous TID WC    metoprolol  100 mg Oral BID    cloNIDine  0.2 mg Oral TID    pantoprazole  40 mg Oral QAM AC    vitamin D  50,000 Units Oral Once per day on Mon Thu     acetaminophen, 650 mg, Q6H PRN  levalbuterol, 1.25 mg, Q8H PRN  sodium chloride flush, 10 mL, PRN  magnesium hydroxide, 30 mL, Daily PRN  ondansetron, 4 mg, Q6H PRN  nitroGLYCERIN, 0.4 mg, Q5 Min PRN  LORazepam, 0.5 mg, BID PRN  glucose, 15 g, PRN  dextrose, 12.5 g, PRN  glucagon (rDNA), 1 mg, PRN  dextrose, 100 mL/hr, PRN  perflutren lipid microspheres, 1.5 mL, ONCE PRN  calcium carbonate, 500 mg, TID PRN         Objective:    /71   Pulse 78   Temp 98.9 °F (37.2 °C) (Oral)   Resp 18   Ht 5' 4\" (1.626 m)   Wt 136 lb 8 oz (61.9 kg)   LMP 11/16/2019 (Approximate)   SpO2 100%   BMI 23.43 kg/m²   General Appearance: alert and oriented to person, place and time and in no acute

## 2020-01-17 ENCOUNTER — APPOINTMENT (OUTPATIENT)
Dept: GENERAL RADIOLOGY | Age: 30
DRG: 469 | End: 2020-01-17
Payer: COMMERCIAL

## 2020-01-17 LAB
ANION GAP SERPL CALCULATED.3IONS-SCNC: 7 MMOL/L (ref 7–16)
BUN BLDV-MCNC: 48 MG/DL (ref 6–20)
CALCIUM SERPL-MCNC: 7.7 MG/DL (ref 8.6–10.2)
CHLORIDE BLD-SCNC: 107 MMOL/L (ref 98–107)
CO2: 23 MMOL/L (ref 22–29)
CREAT SERPL-MCNC: 2.8 MG/DL (ref 0.5–1)
GFR AFRICAN AMERICAN: 24
GFR NON-AFRICAN AMERICAN: 24 ML/MIN/1.73
GLUCOSE BLD-MCNC: 110 MG/DL (ref 74–99)
HCT VFR BLD CALC: 26 % (ref 34–48)
HEMOGLOBIN: 8.4 G/DL (ref 11.5–15.5)
MCH RBC QN AUTO: 29.1 PG (ref 26–35)
MCHC RBC AUTO-ENTMCNC: 32.3 % (ref 32–34.5)
MCV RBC AUTO: 90 FL (ref 80–99.9)
METER GLUCOSE: 101 MG/DL (ref 74–99)
METER GLUCOSE: 103 MG/DL (ref 74–99)
METER GLUCOSE: 309 MG/DL (ref 74–99)
METER GLUCOSE: 82 MG/DL (ref 74–99)
PDW BLD-RTO: 14.6 FL (ref 11.5–15)
PLATELET # BLD: 201 E9/L (ref 130–450)
PMV BLD AUTO: 10.9 FL (ref 7–12)
POTASSIUM SERPL-SCNC: 5.4 MMOL/L (ref 3.5–5)
POTASSIUM SERPL-SCNC: 5.7 MMOL/L (ref 3.5–5)
POTASSIUM SERPL-SCNC: 5.8 MMOL/L (ref 3.5–5)
RBC # BLD: 2.89 E12/L (ref 3.5–5.5)
SODIUM BLD-SCNC: 137 MMOL/L (ref 132–146)
WBC # BLD: 8.4 E9/L (ref 4.5–11.5)

## 2020-01-17 PROCEDURE — 6370000000 HC RX 637 (ALT 250 FOR IP): Performed by: NURSE PRACTITIONER

## 2020-01-17 PROCEDURE — 6370000000 HC RX 637 (ALT 250 FOR IP): Performed by: INTERNAL MEDICINE

## 2020-01-17 PROCEDURE — 36415 COLL VENOUS BLD VENIPUNCTURE: CPT

## 2020-01-17 PROCEDURE — 36592 COLLECT BLOOD FROM PICC: CPT

## 2020-01-17 PROCEDURE — APPSS30 APP SPLIT SHARED TIME 16-30 MINUTES: Performed by: NURSE PRACTITIONER

## 2020-01-17 PROCEDURE — 6360000002 HC RX W HCPCS: Performed by: NURSE PRACTITIONER

## 2020-01-17 PROCEDURE — 80048 BASIC METABOLIC PNL TOTAL CA: CPT

## 2020-01-17 PROCEDURE — 85027 COMPLETE CBC AUTOMATED: CPT

## 2020-01-17 PROCEDURE — 71045 X-RAY EXAM CHEST 1 VIEW: CPT

## 2020-01-17 PROCEDURE — 94640 AIRWAY INHALATION TREATMENT: CPT

## 2020-01-17 PROCEDURE — 99233 SBSQ HOSP IP/OBS HIGH 50: CPT | Performed by: INTERNAL MEDICINE

## 2020-01-17 PROCEDURE — 1200000000 HC SEMI PRIVATE

## 2020-01-17 PROCEDURE — 2580000003 HC RX 258: Performed by: NURSE PRACTITIONER

## 2020-01-17 PROCEDURE — 82962 GLUCOSE BLOOD TEST: CPT

## 2020-01-17 PROCEDURE — 94664 DEMO&/EVAL PT USE INHALER: CPT

## 2020-01-17 PROCEDURE — 84132 ASSAY OF SERUM POTASSIUM: CPT

## 2020-01-17 RX ORDER — FLUDROCORTISONE ACETATE 0.1 MG/1
0.1 TABLET ORAL DAILY
Status: COMPLETED | OUTPATIENT
Start: 2020-01-17 | End: 2020-01-19

## 2020-01-17 RX ORDER — LEVALBUTEROL INHALATION SOLUTION 1.25 MG/3ML
1.25 SOLUTION RESPIRATORY (INHALATION)
Status: DISCONTINUED | OUTPATIENT
Start: 2020-01-17 | End: 2020-01-20 | Stop reason: HOSPADM

## 2020-01-17 RX ORDER — IPRATROPIUM BROMIDE AND ALBUTEROL SULFATE 2.5; .5 MG/3ML; MG/3ML
1 SOLUTION RESPIRATORY (INHALATION)
Status: DISCONTINUED | OUTPATIENT
Start: 2020-01-17 | End: 2020-01-17

## 2020-01-17 RX ADMIN — METOPROLOL TARTRATE 100 MG: 50 TABLET, FILM COATED ORAL at 20:10

## 2020-01-17 RX ADMIN — AMLODIPINE BESYLATE 5 MG: 5 TABLET ORAL at 08:52

## 2020-01-17 RX ADMIN — LORAZEPAM 0.5 MG: 0.5 TABLET ORAL at 20:11

## 2020-01-17 RX ADMIN — Medication 10 ML: at 08:51

## 2020-01-17 RX ADMIN — ASPIRIN 81 MG 81 MG: 81 TABLET ORAL at 08:51

## 2020-01-17 RX ADMIN — AZITHROMYCIN MONOHYDRATE 250 MG: 250 TABLET ORAL at 08:52

## 2020-01-17 RX ADMIN — Medication 125 MG: at 05:15

## 2020-01-17 RX ADMIN — METOPROLOL TARTRATE 100 MG: 50 TABLET, FILM COATED ORAL at 08:51

## 2020-01-17 RX ADMIN — LEVALBUTEROL HYDROCHLORIDE 1.25 MG: 1.25 SOLUTION RESPIRATORY (INHALATION) at 14:01

## 2020-01-17 RX ADMIN — HYDROCODONE BITARTRATE AND ACETAMINOPHEN 2 TABLET: 5; 325 TABLET ORAL at 09:45

## 2020-01-17 RX ADMIN — PANTOPRAZOLE SODIUM 40 MG: 40 TABLET, DELAYED RELEASE ORAL at 05:16

## 2020-01-17 RX ADMIN — Medication 125 MG: at 23:59

## 2020-01-17 RX ADMIN — CLONIDINE HYDROCHLORIDE 0.2 MG: 0.2 TABLET ORAL at 14:19

## 2020-01-17 RX ADMIN — HYDROCODONE BITARTRATE AND ACETAMINOPHEN 2 TABLET: 5; 325 TABLET ORAL at 04:52

## 2020-01-17 RX ADMIN — FLUDROCORTISONE ACETATE 0.1 MG: 0.1 TABLET ORAL at 13:03

## 2020-01-17 RX ADMIN — LEVALBUTEROL HYDROCHLORIDE 1.25 MG: 1.25 SOLUTION RESPIRATORY (INHALATION) at 18:08

## 2020-01-17 RX ADMIN — HYDROCODONE BITARTRATE AND ACETAMINOPHEN 2 TABLET: 5; 325 TABLET ORAL at 23:58

## 2020-01-17 RX ADMIN — INSULIN LISPRO 3 UNITS: 100 INJECTION, SOLUTION INTRAVENOUS; SUBCUTANEOUS at 17:18

## 2020-01-17 RX ADMIN — HYDROCODONE BITARTRATE AND ACETAMINOPHEN 2 TABLET: 5; 325 TABLET ORAL at 14:19

## 2020-01-17 RX ADMIN — INSULIN LISPRO 4 UNITS: 100 INJECTION, SOLUTION INTRAVENOUS; SUBCUTANEOUS at 12:19

## 2020-01-17 RX ADMIN — INSULIN LISPRO 3 UNITS: 100 INJECTION, SOLUTION INTRAVENOUS; SUBCUTANEOUS at 12:18

## 2020-01-17 RX ADMIN — Medication 10 ML: at 20:13

## 2020-01-17 RX ADMIN — CLONIDINE HYDROCHLORIDE 0.2 MG: 0.2 TABLET ORAL at 20:11

## 2020-01-17 RX ADMIN — CALCIUM ACETATE 667 MG: 667 CAPSULE ORAL at 17:16

## 2020-01-17 RX ADMIN — CALCIUM ACETATE 667 MG: 667 CAPSULE ORAL at 08:51

## 2020-01-17 RX ADMIN — CLONIDINE HYDROCHLORIDE 0.2 MG: 0.2 TABLET ORAL at 08:52

## 2020-01-17 RX ADMIN — Medication 125 MG: at 12:19

## 2020-01-17 RX ADMIN — CALCIUM ACETATE 667 MG: 667 CAPSULE ORAL at 12:19

## 2020-01-17 RX ADMIN — AMITRIPTYLINE HYDROCHLORIDE 10 MG: 10 TABLET, FILM COATED ORAL at 20:11

## 2020-01-17 RX ADMIN — INSULIN GLARGINE 6 UNITS: 100 INJECTION, SOLUTION SUBCUTANEOUS at 20:14

## 2020-01-17 RX ADMIN — HYDROCODONE BITARTRATE AND ACETAMINOPHEN 2 TABLET: 5; 325 TABLET ORAL at 19:15

## 2020-01-17 RX ADMIN — ATORVASTATIN CALCIUM 40 MG: 40 TABLET, FILM COATED ORAL at 20:11

## 2020-01-17 RX ADMIN — CALCIUM CARBONATE (ANTACID) CHEW TAB 500 MG 500 MG: 500 CHEW TAB at 08:51

## 2020-01-17 RX ADMIN — Medication 125 MG: at 17:17

## 2020-01-17 ASSESSMENT — PAIN DESCRIPTION - ORIENTATION
ORIENTATION: MID
ORIENTATION: MID;LOWER

## 2020-01-17 ASSESSMENT — PAIN SCALES - GENERAL
PAINLEVEL_OUTOF10: 9
PAINLEVEL_OUTOF10: 2
PAINLEVEL_OUTOF10: 8
PAINLEVEL_OUTOF10: 7
PAINLEVEL_OUTOF10: 7
PAINLEVEL_OUTOF10: 6
PAINLEVEL_OUTOF10: 5
PAINLEVEL_OUTOF10: 8
PAINLEVEL_OUTOF10: 7
PAINLEVEL_OUTOF10: 7

## 2020-01-17 ASSESSMENT — PAIN DESCRIPTION - ONSET
ONSET: ON-GOING
ONSET: AWAKENED FROM SLEEP
ONSET: ON-GOING

## 2020-01-17 ASSESSMENT — PAIN DESCRIPTION - DESCRIPTORS
DESCRIPTORS: ACHING;CRAMPING;DISCOMFORT
DESCRIPTORS: ACHING;CRAMPING;CONSTANT
DESCRIPTORS: ACHING;CRAMPING;NAGGING
DESCRIPTORS: ACHING;CRAMPING;CRUSHING
DESCRIPTORS: ACHING;CRAMPING

## 2020-01-17 ASSESSMENT — PAIN - FUNCTIONAL ASSESSMENT
PAIN_FUNCTIONAL_ASSESSMENT: PREVENTS OR INTERFERES SOME ACTIVE ACTIVITIES AND ADLS
PAIN_FUNCTIONAL_ASSESSMENT: ACTIVITIES ARE NOT PREVENTED

## 2020-01-17 ASSESSMENT — PAIN DESCRIPTION - PAIN TYPE
TYPE: ACUTE PAIN

## 2020-01-17 ASSESSMENT — PAIN DESCRIPTION - PROGRESSION
CLINICAL_PROGRESSION: NOT CHANGED

## 2020-01-17 ASSESSMENT — PAIN DESCRIPTION - LOCATION
LOCATION: ABDOMEN

## 2020-01-17 ASSESSMENT — PAIN DESCRIPTION - FREQUENCY
FREQUENCY: INTERMITTENT
FREQUENCY: CONTINUOUS
FREQUENCY: INTERMITTENT
FREQUENCY: CONTINUOUS
FREQUENCY: INTERMITTENT

## 2020-01-17 NOTE — PROGRESS NOTES
[address] [place], [place] [address]   Progress Note    Admitting Date and Time: 1/12/2020 10:51 PM  Admit Dx: Chest pain, unspecified [R07.9]  Acute respiratory failure with hypoxia ([facility] [address] [J96.01]    Subjective:    Patient reports that her abdominal pain is starting to improve since she was started on Vancomycin for c diff. ROS: denies fever, chills, cp, sob, n/v, HA unless stated above.      fludrocortisone  0.1 mg Oral Daily    levalbuterol  1.25 mg Nebulization Q4H WA    vancomycin  125 mg Oral 4 times per day    azithromycin  250 mg Oral Daily    sodium chloride flush  10 mL Intravenous 2 times per day    atorvastatin  40 mg Oral Nightly    aspirin  81 mg Oral Daily    amitriptyline  10 mg Oral Nightly    amLODIPine  5 mg Oral Daily    calcium acetate  667 mg Oral TID WC    [Held by provider] isosorbide mononitrate  30 mg Oral Daily    insulin glargine  6 Units Subcutaneous Nightly    insulin lispro  0-6 Units Subcutaneous TID WC    insulin lispro  0-3 Units Subcutaneous Nightly    insulin lispro  3 Units Subcutaneous TID WC    metoprolol  100 mg Oral BID    cloNIDine  0.2 mg Oral TID    pantoprazole  40 mg Oral QAM AC    vitamin D  50,000 Units Oral Once per day on Mon Thu     HYDROcodone 5 mg - acetaminophen, 1 tablet, Q4H PRN    Or  HYDROcodone 5 mg - acetaminophen, 2 tablet, Q4H PRN  acetaminophen, 650 mg, Q6H PRN  sodium chloride flush, 10 mL, PRN  magnesium hydroxide, 30 mL, Daily PRN  ondansetron, 4 mg, Q6H PRN  nitroGLYCERIN, 0.4 mg, Q5 Min PRN  LORazepam, 0.5 mg, BID PRN  glucose, 15 g, PRN  dextrose, 12.5 g, PRN  glucagon (rDNA), 1 mg, PRN  dextrose, 100 mL/hr, PRN  perflutren lipid microspheres, 1.5 mL, ONCE PRN  calcium carbonate, 500 mg, TID PRN         Objective:    /77   Pulse 69   Temp 97.9 °F (36.6 °C) (Oral)   Resp 12   Ht 5' 4\" (1.626 m)   Wt 140 lb (63.5 kg)   LMP 11/16/2019 (Approximate)   SpO2 100%   BMI 24.03 kg/m²   General Appearance: alert and XR CHEST 1 VW   Final Result   No acute cardiopulmonary disease. Assessment:  Principal Problem:    Dyspnea on exertion  Active Problems:    Poorly controlled type 1 diabetes mellitus (HCC)    Tobacco use    Essential hypertension    Acute respiratory failure with hypoxia (HCC)    Chest pain, unspecified    Dehydration    Sinus tachycardia    Orthostatic hypotension  Resolved Problems:    * No resolved hospital problems. *      Plan:  1. Shortness of breath:  Echo on 10/30/19 revealed an EF of 55%. D dimer was 281. Unable to have CTA of the chest due to BC. VQ scan negative. Repeat Echo ordered by Cardiology. Rapid flu negative. Respiratory array panel negative. Pulmonology following. Repeat echo on 1/13/2020 showed an EF of 60%. 2. Chest pain:  Heart score 4. Troponin 0.02 EKG with T wave inversions in inferior and lateral leads. Troponin 0.02 -> 0.01 -> <0.01. Cardiology evaluated and no further cardiac work-up at this time. Cardiology has signed off. 3. Community acquired pneumonia vs aspiration:  Continue azithromycin and Zosyn empirically by Pulmonology. Chest x-ray negative. 4. C diff:  Stool positive for c diff. Continue Vancomycin 125 mg oral every 6 hours. 5. Tachycardia: Heart rate currently controlled. Continue metoprolol. 6. Orthostatic hypotension:   Hold Imdur. Last set of orthoses were negative. 7. Acute kidney injury on chronic kidney disease: Creatinine was 3.2 on admission and is now 2.2.   8. Metabolic acidosis:   Resolved. 9. Poorly controlled diabetes:  Hgb A1C is 9.8 (8.8 on 10/2/19). Continue Insulin sliding scale with blood sugar checks, hypoglycemic protocol. 10. Gastroparesis:  EGD on 10/11/19 showed a large amount of solid food in the stomach. Gastric emptying studying on 10/12/19 with significantly delayed gastric emptying with persistent uptake within the esophagus.   11. Hypertension:  Continue home medications, except Imdur due to orthostatic hypotension. 12. Vitamin D deficiency: Vitamin D level is <5. Continue Vitamin D 21197 units weekly. 13. Tobacco use:  Smoking cessation education. 14. Anemia:  Iron studies done. EGD done on 10/11/19 showed no fresh or active bleeding. Received one unit of PRBCs 1/15/19. Continue Aranesp. 15. Hyperkalemia: Florinef x 3 days per Nephrology. Heparin stopped, however patient has been refusing it. NOTE: This report was transcribed using voice recognition software.  Every effort was made to ensure accuracy; however, inadvertent computerized transcription errors may be present.     Electronically signed by HEBER Burch CNP on 1/17/2020 at 12:29 PM

## 2020-01-17 NOTE — PROGRESS NOTES
Department of Internal Medicine  Nephrology Attending Progress Note        SUBJECTIVE:  We are following this patient for acute kidney injury on chronic kidney disease stage 4. The patient very tired. Short of breath. No appetite. Physical Exam:    VITALS:  /77   Pulse 69   Temp 97.9 °F (36.6 °C) (Oral)   Resp 12   Ht 5' 4\" (1.626 m)   Wt 140 lb (63.5 kg)   LMP 11/16/2019 (Approximate)   SpO2 100%   BMI 24.03 kg/m²   24HR INTAKE/OUTPUT:      Intake/Output Summary (Last 24 hours) at 1/17/2020 1104  Last data filed at 1/17/2020 0851  Gross per 24 hour   Intake 950 ml   Output --   Net 950 ml       Constitutional:  Well built. Fairly nourished. Mild distress. Access Exam:  none   Cardiovascular/Edema:  Heart sounds normal. No murmur. Respiratory:  Breath sounds normal. Few rhonchi. Gastrointestinal:  Bowel sounds normal. No organomegaly. Other:  No edema. A/ox 3. No focal deficit  BEERLA. JVD not seen. Cadence Car DATA:    CBC:   Lab Results   Component Value Date    WBC 8.4 01/17/2020    RBC 2.89 01/17/2020    HGB 8.4 01/17/2020    HCT 26.0 01/17/2020    MCV 90.0 01/17/2020    MCH 29.1 01/17/2020    MCHC 32.3 01/17/2020    RDW 14.6 01/17/2020     01/17/2020    MPV 10.9 01/17/2020     CMP:    Lab Results   Component Value Date     01/17/2020    K 5.8 01/17/2020    K 4.0 01/13/2020     01/17/2020    CO2 23 01/17/2020    BUN 48 01/17/2020    CREATININE 2.8 01/17/2020    GFRAA 24 01/17/2020    LABGLOM 24 01/17/2020    GLUCOSE 110 01/17/2020    GLUCOSE 130 05/18/2012    PROT 5.0 01/15/2020    LABALBU 2.4 01/15/2020    LABALBU 4.1 05/18/2012    CALCIUM 7.7 01/17/2020    BILITOT <0.2 01/15/2020    ALKPHOS 115 01/15/2020    AST 17 01/15/2020    ALT 8 01/15/2020       IMPRESSION/RECOMMENDATIONS:      Acute kidney injury. Pre renal.  CKD stage 4. DM and chronic interstitial nephritis. Sever anemia. OF CKD stage. 4  Metabolic acidosis. Hyperkalemia. Type 4 RTA.  From DM and heparin    - Florinef

## 2020-01-17 NOTE — PLAN OF CARE
Problem: Pain:  Goal: Pain level will decrease  Description  Pain level will decrease  Outcome: Met This Shift  Goal: Control of acute pain  Description  Control of acute pain  1/17/2020 1034 by Richard Galloway RN  Outcome: Met This Shift  1/17/2020 0040 by Jody Gomez RN  Outcome: Met This Shift  Goal: Control of chronic pain  Description  Control of chronic pain  Outcome: Met This Shift     Problem: Falls - Risk of:  Goal: Will remain free from falls  Description  Will remain free from falls  1/17/2020 1034 by Richard Galloway RN  Outcome: Met This Shift  1/17/2020 0040 by Jody Gomez RN  Outcome: Met This Shift  Goal: Absence of physical injury  Description  Absence of physical injury  1/17/2020 1034 by Richard Galloway RN  Outcome: Met This Shift  1/17/2020 0040 by Jody Gomez RN  Outcome: Met This Shift   Continue current treatments

## 2020-01-17 NOTE — CARE COORDINATION
CM note: Placed on oral vanc solution for C-diff. Left a sticky note for internal medicine to sign a paper script to check need for PA, cost and pharmacy availability.

## 2020-01-17 NOTE — PLAN OF CARE
Problem: Pain:  Goal: Control of acute pain  Description  Control of acute pain  1/17/2020 0040 by Katalina Julian RN  Outcome: Met This Shift     Problem: Falls - Risk of:  Goal: Will remain free from falls  Description  Will remain free from falls  1/17/2020 0040 by Katalina Julian RN  Outcome: Met This Shift     Problem: Falls - Risk of:  Goal: Absence of physical injury  Description  Absence of physical injury  Outcome: Met This Shift

## 2020-01-17 NOTE — CARE COORDINATION
CM note:  [facility] has Vanco in stock and it is covered by [employer] without need for prior auth and patient has no copay. Patient to take original script to pharmacy to have it filled, she is aware. Patient also request that CM change pharmacy to [facility] for any other new medications for this admission ONLY.   CM has changed pharmacy per request.

## 2020-01-18 LAB
ANION GAP SERPL CALCULATED.3IONS-SCNC: 11 MMOL/L (ref 7–16)
ANION GAP SERPL CALCULATED.3IONS-SCNC: 8 MMOL/L (ref 7–16)
BUN BLDV-MCNC: 50 MG/DL (ref 6–20)
BUN BLDV-MCNC: 51 MG/DL (ref 6–20)
CALCIUM SERPL-MCNC: 8.1 MG/DL (ref 8.6–10.2)
CALCIUM SERPL-MCNC: 8.7 MG/DL (ref 8.6–10.2)
CHLORIDE BLD-SCNC: 105 MMOL/L (ref 98–107)
CHLORIDE BLD-SCNC: 105 MMOL/L (ref 98–107)
CO2: 21 MMOL/L (ref 22–29)
CO2: 21 MMOL/L (ref 22–29)
CREAT SERPL-MCNC: 3 MG/DL (ref 0.5–1)
CREAT SERPL-MCNC: 3 MG/DL (ref 0.5–1)
CULTURE, STOOL: NORMAL
GFR AFRICAN AMERICAN: 23
GFR AFRICAN AMERICAN: 23
GFR NON-AFRICAN AMERICAN: 23 ML/MIN/1.73
GFR NON-AFRICAN AMERICAN: 23 ML/MIN/1.73
GLUCOSE BLD-MCNC: 118 MG/DL (ref 74–99)
GLUCOSE BLD-MCNC: 312 MG/DL (ref 74–99)
HCT VFR BLD CALC: 28.2 % (ref 34–48)
HEMOGLOBIN: 8.9 G/DL (ref 11.5–15.5)
MCH RBC QN AUTO: 28.6 PG (ref 26–35)
MCHC RBC AUTO-ENTMCNC: 31.6 % (ref 32–34.5)
MCV RBC AUTO: 90.7 FL (ref 80–99.9)
METER GLUCOSE: 148 MG/DL (ref 74–99)
METER GLUCOSE: 270 MG/DL (ref 74–99)
METER GLUCOSE: 301 MG/DL (ref 74–99)
METER GLUCOSE: 314 MG/DL (ref 74–99)
METER GLUCOSE: 417 MG/DL (ref 74–99)
METER GLUCOSE: 74 MG/DL (ref 74–99)
PDW BLD-RTO: 14.6 FL (ref 11.5–15)
PLATELET # BLD: 233 E9/L (ref 130–450)
PMV BLD AUTO: 10.1 FL (ref 7–12)
POTASSIUM SERPL-SCNC: 5.5 MMOL/L (ref 3.5–5)
POTASSIUM SERPL-SCNC: 5.9 MMOL/L (ref 3.5–5)
RBC # BLD: 3.11 E12/L (ref 3.5–5.5)
SODIUM BLD-SCNC: 134 MMOL/L (ref 132–146)
SODIUM BLD-SCNC: 137 MMOL/L (ref 132–146)
WBC # BLD: 8.3 E9/L (ref 4.5–11.5)

## 2020-01-18 PROCEDURE — 2500000003 HC RX 250 WO HCPCS: Performed by: INTERNAL MEDICINE

## 2020-01-18 PROCEDURE — 6370000000 HC RX 637 (ALT 250 FOR IP): Performed by: NURSE PRACTITIONER

## 2020-01-18 PROCEDURE — 6370000000 HC RX 637 (ALT 250 FOR IP): Performed by: INTERNAL MEDICINE

## 2020-01-18 PROCEDURE — 2580000003 HC RX 258: Performed by: INTERNAL MEDICINE

## 2020-01-18 PROCEDURE — 99232 SBSQ HOSP IP/OBS MODERATE 35: CPT | Performed by: INTERNAL MEDICINE

## 2020-01-18 PROCEDURE — 94640 AIRWAY INHALATION TREATMENT: CPT

## 2020-01-18 PROCEDURE — APPSS30 APP SPLIT SHARED TIME 16-30 MINUTES: Performed by: NURSE PRACTITIONER

## 2020-01-18 PROCEDURE — 82962 GLUCOSE BLOOD TEST: CPT

## 2020-01-18 PROCEDURE — 6360000002 HC RX W HCPCS: Performed by: NURSE PRACTITIONER

## 2020-01-18 PROCEDURE — 1200000000 HC SEMI PRIVATE

## 2020-01-18 PROCEDURE — 36415 COLL VENOUS BLD VENIPUNCTURE: CPT

## 2020-01-18 PROCEDURE — 2580000003 HC RX 258: Performed by: NURSE PRACTITIONER

## 2020-01-18 PROCEDURE — 80048 BASIC METABOLIC PNL TOTAL CA: CPT

## 2020-01-18 PROCEDURE — 85027 COMPLETE CBC AUTOMATED: CPT

## 2020-01-18 RX ORDER — LORAZEPAM 0.5 MG/1
0.5 TABLET ORAL EVERY 8 HOURS PRN
Status: DISCONTINUED | OUTPATIENT
Start: 2020-01-18 | End: 2020-01-20 | Stop reason: HOSPADM

## 2020-01-18 RX ADMIN — Medication 125 MG: at 05:44

## 2020-01-18 RX ADMIN — AZITHROMYCIN MONOHYDRATE 250 MG: 250 TABLET ORAL at 08:07

## 2020-01-18 RX ADMIN — CALCIUM ACETATE 667 MG: 667 CAPSULE ORAL at 08:07

## 2020-01-18 RX ADMIN — CALCIUM ACETATE 667 MG: 667 CAPSULE ORAL at 15:41

## 2020-01-18 RX ADMIN — METOPROLOL TARTRATE 100 MG: 50 TABLET, FILM COATED ORAL at 08:07

## 2020-01-18 RX ADMIN — Medication 10 ML: at 08:08

## 2020-01-18 RX ADMIN — AMITRIPTYLINE HYDROCHLORIDE 10 MG: 10 TABLET, FILM COATED ORAL at 21:36

## 2020-01-18 RX ADMIN — HYDROCODONE BITARTRATE AND ACETAMINOPHEN 2 TABLET: 5; 325 TABLET ORAL at 17:33

## 2020-01-18 RX ADMIN — HYDROCODONE BITARTRATE AND ACETAMINOPHEN 2 TABLET: 5; 325 TABLET ORAL at 08:07

## 2020-01-18 RX ADMIN — INSULIN LISPRO 3 UNITS: 100 INJECTION, SOLUTION INTRAVENOUS; SUBCUTANEOUS at 08:09

## 2020-01-18 RX ADMIN — INSULIN LISPRO 1 UNITS: 100 INJECTION, SOLUTION INTRAVENOUS; SUBCUTANEOUS at 12:31

## 2020-01-18 RX ADMIN — ASPIRIN 81 MG 81 MG: 81 TABLET ORAL at 08:07

## 2020-01-18 RX ADMIN — LEVALBUTEROL HYDROCHLORIDE 1.25 MG: 1.25 SOLUTION RESPIRATORY (INHALATION) at 07:03

## 2020-01-18 RX ADMIN — AMLODIPINE BESYLATE 5 MG: 5 TABLET ORAL at 08:07

## 2020-01-18 RX ADMIN — CALCIUM ACETATE 667 MG: 667 CAPSULE ORAL at 12:23

## 2020-01-18 RX ADMIN — INSULIN LISPRO 4 UNITS: 100 INJECTION, SOLUTION INTRAVENOUS; SUBCUTANEOUS at 08:15

## 2020-01-18 RX ADMIN — Medication 125 MG: at 23:54

## 2020-01-18 RX ADMIN — SODIUM BICARBONATE: 84 INJECTION, SOLUTION INTRAVENOUS at 17:46

## 2020-01-18 RX ADMIN — Medication 125 MG: at 17:35

## 2020-01-18 RX ADMIN — ATORVASTATIN CALCIUM 40 MG: 40 TABLET, FILM COATED ORAL at 21:36

## 2020-01-18 RX ADMIN — INSULIN GLARGINE 6 UNITS: 100 INJECTION, SOLUTION SUBCUTANEOUS at 21:36

## 2020-01-18 RX ADMIN — HYDROCODONE BITARTRATE AND ACETAMINOPHEN 2 TABLET: 5; 325 TABLET ORAL at 03:57

## 2020-01-18 RX ADMIN — LEVALBUTEROL HYDROCHLORIDE 1.25 MG: 1.25 SOLUTION RESPIRATORY (INHALATION) at 13:49

## 2020-01-18 RX ADMIN — HYDROCODONE BITARTRATE AND ACETAMINOPHEN 2 TABLET: 5; 325 TABLET ORAL at 13:33

## 2020-01-18 RX ADMIN — CLONIDINE HYDROCHLORIDE 0.2 MG: 0.2 TABLET ORAL at 21:36

## 2020-01-18 RX ADMIN — PANTOPRAZOLE SODIUM 40 MG: 40 TABLET, DELAYED RELEASE ORAL at 05:43

## 2020-01-18 RX ADMIN — FLUDROCORTISONE ACETATE 0.1 MG: 0.1 TABLET ORAL at 08:07

## 2020-01-18 RX ADMIN — LORAZEPAM 0.5 MG: 0.5 TABLET ORAL at 17:32

## 2020-01-18 RX ADMIN — LORAZEPAM 0.5 MG: 0.5 TABLET ORAL at 08:07

## 2020-01-18 RX ADMIN — Medication 125 MG: at 12:24

## 2020-01-18 RX ADMIN — INSULIN LISPRO 3 UNITS: 100 INJECTION, SOLUTION INTRAVENOUS; SUBCUTANEOUS at 21:36

## 2020-01-18 RX ADMIN — METOPROLOL TARTRATE 100 MG: 50 TABLET, FILM COATED ORAL at 21:36

## 2020-01-18 RX ADMIN — CLONIDINE HYDROCHLORIDE 0.2 MG: 0.2 TABLET ORAL at 08:07

## 2020-01-18 RX ADMIN — INSULIN LISPRO 3 UNITS: 100 INJECTION, SOLUTION INTRAVENOUS; SUBCUTANEOUS at 12:25

## 2020-01-18 ASSESSMENT — PAIN DESCRIPTION - FREQUENCY
FREQUENCY: CONTINUOUS
FREQUENCY: INTERMITTENT

## 2020-01-18 ASSESSMENT — PAIN SCALES - GENERAL
PAINLEVEL_OUTOF10: 6
PAINLEVEL_OUTOF10: 4
PAINLEVEL_OUTOF10: 8
PAINLEVEL_OUTOF10: 4
PAINLEVEL_OUTOF10: 8
PAINLEVEL_OUTOF10: 9
PAINLEVEL_OUTOF10: 6
PAINLEVEL_OUTOF10: 3
PAINLEVEL_OUTOF10: 9

## 2020-01-18 ASSESSMENT — PAIN DESCRIPTION - PAIN TYPE
TYPE: ACUTE PAIN
TYPE: ACUTE PAIN

## 2020-01-18 ASSESSMENT — PAIN DESCRIPTION - ORIENTATION
ORIENTATION: MID;LOWER
ORIENTATION: MID

## 2020-01-18 ASSESSMENT — PAIN DESCRIPTION - DESCRIPTORS
DESCRIPTORS: ACHING;CONSTANT
DESCRIPTORS: ACHING;CRAMPING

## 2020-01-18 ASSESSMENT — PAIN DESCRIPTION - PROGRESSION
CLINICAL_PROGRESSION: NOT CHANGED

## 2020-01-18 ASSESSMENT — PAIN DESCRIPTION - ONSET
ONSET: ON-GOING
ONSET: GRADUAL

## 2020-01-18 ASSESSMENT — PAIN DESCRIPTION - LOCATION
LOCATION: ABDOMEN

## 2020-01-18 NOTE — PROGRESS NOTES
[address] [place], [place] [address]ist   Progress Note    Admitting Date and Time: 1/12/2020 10:51 PM  Admit Dx: Chest pain, unspecified [R07.9]  Acute respiratory failure with hypoxia ([facility] [address] [J96.01]    Subjective:    Patient reports that her abdominal pain is better this morning, but she had a rough night. She had increased abdominal cramping last night and had a really large bowel movement. She was not able to sleep after that. ROS: denies fever, chills, cp, sob, n/v, HA unless stated above.      fludrocortisone  0.1 mg Oral Daily    levalbuterol  1.25 mg Nebulization Q4H WA    vancomycin  125 mg Oral 4 times per day    azithromycin  250 mg Oral Daily    sodium chloride flush  10 mL Intravenous 2 times per day    atorvastatin  40 mg Oral Nightly    aspirin  81 mg Oral Daily    amitriptyline  10 mg Oral Nightly    amLODIPine  5 mg Oral Daily    calcium acetate  667 mg Oral TID WC    [Held by provider] isosorbide mononitrate  30 mg Oral Daily    insulin glargine  6 Units Subcutaneous Nightly    insulin lispro  0-6 Units Subcutaneous TID WC    insulin lispro  0-3 Units Subcutaneous Nightly    insulin lispro  3 Units Subcutaneous TID WC    metoprolol  100 mg Oral BID    cloNIDine  0.2 mg Oral TID    pantoprazole  40 mg Oral QAM AC    vitamin D  50,000 Units Oral Once per day on Mon Thu     HYDROcodone 5 mg - acetaminophen, 1 tablet, Q4H PRN    Or  HYDROcodone 5 mg - acetaminophen, 2 tablet, Q4H PRN  acetaminophen, 650 mg, Q6H PRN  sodium chloride flush, 10 mL, PRN  magnesium hydroxide, 30 mL, Daily PRN  ondansetron, 4 mg, Q6H PRN  nitroGLYCERIN, 0.4 mg, Q5 Min PRN  LORazepam, 0.5 mg, BID PRN  glucose, 15 g, PRN  dextrose, 12.5 g, PRN  glucagon (rDNA), 1 mg, PRN  dextrose, 100 mL/hr, PRN  perflutren lipid microspheres, 1.5 mL, ONCE PRN  calcium carbonate, 500 mg, TID PRN         Objective:    BP (!) 169/74   Pulse 71   Temp 97.9 °F (36.6 °C) (Oral)   Resp 18   Ht 5' 4\" (1.626 m)   Wt within the esophagus. 11. Hypertension:  Continue home medications, except Imdur due to orthostatic hypotension. 12. Vitamin D deficiency: Vitamin D level is <5. Continue Vitamin D 68670 units weekly. 13. Tobacco use:  Smoking cessation education. 14. Anemia:  Iron studies done. EGD done on 10/11/19 showed no fresh or active bleeding. Received one unit of PRBCs 1/15/19. Continue Aranesp. 15. Hyperkalemia: Florinef x 3 days per Nephrology. Heparin stopped, however patient has been refusing it. K is 5.9 today. Consult Dietician for dietary instruction on potassium containing foods. NOTE: This report was transcribed using voice recognition software.  Every effort was made to ensure accuracy; however, inadvertent computerized transcription errors may be present.     Electronically signed by HEBER White CNP on 1/18/2020 at 9:48 AM

## 2020-01-19 ENCOUNTER — APPOINTMENT (OUTPATIENT)
Dept: ULTRASOUND IMAGING | Age: 30
DRG: 469 | End: 2020-01-19
Payer: COMMERCIAL

## 2020-01-19 LAB
ANION GAP SERPL CALCULATED.3IONS-SCNC: 11 MMOL/L (ref 7–16)
BLOOD BANK DISPENSE STATUS: NORMAL
BLOOD BANK DISPENSE STATUS: NORMAL
BLOOD BANK PRODUCT CODE: NORMAL
BLOOD BANK PRODUCT CODE: NORMAL
BPU ID: NORMAL
BPU ID: NORMAL
BUN BLDV-MCNC: 47 MG/DL (ref 6–20)
CALCIUM SERPL-MCNC: 8.2 MG/DL (ref 8.6–10.2)
CHLORIDE BLD-SCNC: 103 MMOL/L (ref 98–107)
CO2: 21 MMOL/L (ref 22–29)
CREAT SERPL-MCNC: 2.9 MG/DL (ref 0.5–1)
DESCRIPTION BLOOD BANK: NORMAL
DESCRIPTION BLOOD BANK: NORMAL
GFR AFRICAN AMERICAN: 23
GFR NON-AFRICAN AMERICAN: 23 ML/MIN/1.73
GLUCOSE BLD-MCNC: 294 MG/DL (ref 74–99)
HCT VFR BLD CALC: 28.9 % (ref 34–48)
HEMOGLOBIN: 8.9 G/DL (ref 11.5–15.5)
MCH RBC QN AUTO: 28.6 PG (ref 26–35)
MCHC RBC AUTO-ENTMCNC: 30.8 % (ref 32–34.5)
MCV RBC AUTO: 92.9 FL (ref 80–99.9)
METER GLUCOSE: 171 MG/DL (ref 74–99)
METER GLUCOSE: 188 MG/DL (ref 74–99)
METER GLUCOSE: 303 MG/DL (ref 74–99)
METER GLUCOSE: 43 MG/DL (ref 74–99)
METER GLUCOSE: 66 MG/DL (ref 74–99)
PDW BLD-RTO: 14.6 FL (ref 11.5–15)
PLATELET # BLD: 236 E9/L (ref 130–450)
PMV BLD AUTO: 10.7 FL (ref 7–12)
POTASSIUM SERPL-SCNC: 5.6 MMOL/L (ref 3.5–5)
RBC # BLD: 3.11 E12/L (ref 3.5–5.5)
SODIUM BLD-SCNC: 135 MMOL/L (ref 132–146)
WBC # BLD: 8.4 E9/L (ref 4.5–11.5)

## 2020-01-19 PROCEDURE — 6360000002 HC RX W HCPCS: Performed by: NURSE PRACTITIONER

## 2020-01-19 PROCEDURE — 6370000000 HC RX 637 (ALT 250 FOR IP): Performed by: NURSE PRACTITIONER

## 2020-01-19 PROCEDURE — 36415 COLL VENOUS BLD VENIPUNCTURE: CPT

## 2020-01-19 PROCEDURE — 6370000000 HC RX 637 (ALT 250 FOR IP): Performed by: INTERNAL MEDICINE

## 2020-01-19 PROCEDURE — 2580000003 HC RX 258: Performed by: NURSE PRACTITIONER

## 2020-01-19 PROCEDURE — 94640 AIRWAY INHALATION TREATMENT: CPT

## 2020-01-19 PROCEDURE — 2580000003 HC RX 258: Performed by: INTERNAL MEDICINE

## 2020-01-19 PROCEDURE — 80048 BASIC METABOLIC PNL TOTAL CA: CPT

## 2020-01-19 PROCEDURE — 85027 COMPLETE CBC AUTOMATED: CPT

## 2020-01-19 PROCEDURE — 51798 US URINE CAPACITY MEASURE: CPT

## 2020-01-19 PROCEDURE — 2500000003 HC RX 250 WO HCPCS: Performed by: INTERNAL MEDICINE

## 2020-01-19 PROCEDURE — 82962 GLUCOSE BLOOD TEST: CPT

## 2020-01-19 PROCEDURE — 99233 SBSQ HOSP IP/OBS HIGH 50: CPT | Performed by: INTERNAL MEDICINE

## 2020-01-19 PROCEDURE — 93970 EXTREMITY STUDY: CPT

## 2020-01-19 PROCEDURE — 1200000000 HC SEMI PRIVATE

## 2020-01-19 PROCEDURE — APPSS30 APP SPLIT SHARED TIME 16-30 MINUTES: Performed by: NURSE PRACTITIONER

## 2020-01-19 PROCEDURE — 6370000000 HC RX 637 (ALT 250 FOR IP): Performed by: UROLOGY

## 2020-01-19 RX ORDER — FUROSEMIDE 10 MG/ML
40 INJECTION INTRAMUSCULAR; INTRAVENOUS ONCE
Status: COMPLETED | OUTPATIENT
Start: 2020-01-19 | End: 2020-01-19

## 2020-01-19 RX ORDER — BETHANECHOL CHLORIDE 25 MG/1
25 TABLET ORAL 3 TIMES DAILY
Status: DISCONTINUED | OUTPATIENT
Start: 2020-01-19 | End: 2020-01-20 | Stop reason: HOSPADM

## 2020-01-19 RX ADMIN — AMITRIPTYLINE HYDROCHLORIDE 10 MG: 10 TABLET, FILM COATED ORAL at 20:59

## 2020-01-19 RX ADMIN — INSULIN LISPRO 1 UNITS: 100 INJECTION, SOLUTION INTRAVENOUS; SUBCUTANEOUS at 17:09

## 2020-01-19 RX ADMIN — CLONIDINE HYDROCHLORIDE 0.2 MG: 0.2 TABLET ORAL at 07:53

## 2020-01-19 RX ADMIN — LEVALBUTEROL HYDROCHLORIDE 1.25 MG: 1.25 SOLUTION RESPIRATORY (INHALATION) at 09:27

## 2020-01-19 RX ADMIN — ATORVASTATIN CALCIUM 40 MG: 40 TABLET, FILM COATED ORAL at 21:00

## 2020-01-19 RX ADMIN — LEVALBUTEROL HYDROCHLORIDE 1.25 MG: 1.25 SOLUTION RESPIRATORY (INHALATION) at 18:54

## 2020-01-19 RX ADMIN — BETHANECHOL CHLORIDE 25 MG: 25 TABLET ORAL at 21:53

## 2020-01-19 RX ADMIN — AZITHROMYCIN MONOHYDRATE 250 MG: 250 TABLET ORAL at 07:53

## 2020-01-19 RX ADMIN — INSULIN LISPRO 1 UNITS: 100 INJECTION, SOLUTION INTRAVENOUS; SUBCUTANEOUS at 21:51

## 2020-01-19 RX ADMIN — INSULIN LISPRO 4 UNITS: 100 INJECTION, SOLUTION INTRAVENOUS; SUBCUTANEOUS at 12:06

## 2020-01-19 RX ADMIN — CALCIUM ACETATE 667 MG: 667 CAPSULE ORAL at 12:05

## 2020-01-19 RX ADMIN — AMLODIPINE BESYLATE 5 MG: 5 TABLET ORAL at 07:53

## 2020-01-19 RX ADMIN — LEVALBUTEROL HYDROCHLORIDE 1.25 MG: 1.25 SOLUTION RESPIRATORY (INHALATION) at 14:20

## 2020-01-19 RX ADMIN — LORAZEPAM 0.5 MG: 0.5 TABLET ORAL at 07:53

## 2020-01-19 RX ADMIN — INSULIN GLARGINE 6 UNITS: 100 INJECTION, SOLUTION SUBCUTANEOUS at 21:51

## 2020-01-19 RX ADMIN — Medication 10 ML: at 07:53

## 2020-01-19 RX ADMIN — ASPIRIN 81 MG 81 MG: 81 TABLET ORAL at 07:53

## 2020-01-19 RX ADMIN — FUROSEMIDE 40 MG: 10 INJECTION, SOLUTION INTRAMUSCULAR; INTRAVENOUS at 09:17

## 2020-01-19 RX ADMIN — HYDROCODONE BITARTRATE AND ACETAMINOPHEN 2 TABLET: 5; 325 TABLET ORAL at 22:54

## 2020-01-19 RX ADMIN — HYDROCODONE BITARTRATE AND ACETAMINOPHEN 2 TABLET: 5; 325 TABLET ORAL at 17:05

## 2020-01-19 RX ADMIN — LORAZEPAM 0.5 MG: 0.5 TABLET ORAL at 18:22

## 2020-01-19 RX ADMIN — Medication 125 MG: at 17:28

## 2020-01-19 RX ADMIN — CALCIUM ACETATE 667 MG: 667 CAPSULE ORAL at 07:53

## 2020-01-19 RX ADMIN — BETHANECHOL CHLORIDE 25 MG: 25 TABLET ORAL at 16:47

## 2020-01-19 RX ADMIN — METOPROLOL TARTRATE 100 MG: 50 TABLET, FILM COATED ORAL at 21:00

## 2020-01-19 RX ADMIN — PANTOPRAZOLE SODIUM 40 MG: 40 TABLET, DELAYED RELEASE ORAL at 06:31

## 2020-01-19 RX ADMIN — Medication 125 MG: at 12:06

## 2020-01-19 RX ADMIN — INSULIN LISPRO 3 UNITS: 100 INJECTION, SOLUTION INTRAVENOUS; SUBCUTANEOUS at 17:09

## 2020-01-19 RX ADMIN — FLUDROCORTISONE ACETATE 0.1 MG: 0.1 TABLET ORAL at 07:53

## 2020-01-19 RX ADMIN — CLONIDINE HYDROCHLORIDE 0.2 MG: 0.2 TABLET ORAL at 20:59

## 2020-01-19 RX ADMIN — Medication 125 MG: at 06:30

## 2020-01-19 RX ADMIN — METOPROLOL TARTRATE 100 MG: 50 TABLET, FILM COATED ORAL at 07:53

## 2020-01-19 RX ADMIN — HYDROCODONE BITARTRATE AND ACETAMINOPHEN 2 TABLET: 5; 325 TABLET ORAL at 09:16

## 2020-01-19 RX ADMIN — INSULIN LISPRO 3 UNITS: 100 INJECTION, SOLUTION INTRAVENOUS; SUBCUTANEOUS at 12:06

## 2020-01-19 RX ADMIN — CALCIUM ACETATE 667 MG: 667 CAPSULE ORAL at 17:05

## 2020-01-19 RX ADMIN — SODIUM BICARBONATE: 84 INJECTION, SOLUTION INTRAVENOUS at 06:33

## 2020-01-19 RX ADMIN — CLONIDINE HYDROCHLORIDE 0.2 MG: 0.2 TABLET ORAL at 13:57

## 2020-01-19 RX ADMIN — LEVALBUTEROL HYDROCHLORIDE 1.25 MG: 1.25 SOLUTION RESPIRATORY (INHALATION) at 06:10

## 2020-01-19 RX ADMIN — Medication 10 ML: at 21:56

## 2020-01-19 ASSESSMENT — PAIN SCALES - GENERAL
PAINLEVEL_OUTOF10: 8
PAINLEVEL_OUTOF10: 7
PAINLEVEL_OUTOF10: 8
PAINLEVEL_OUTOF10: 8

## 2020-01-19 ASSESSMENT — PAIN DESCRIPTION - PROGRESSION
CLINICAL_PROGRESSION: NOT CHANGED

## 2020-01-19 ASSESSMENT — PAIN DESCRIPTION - PAIN TYPE: TYPE: ACUTE PAIN

## 2020-01-19 ASSESSMENT — PAIN DESCRIPTION - LOCATION: LOCATION: ABDOMEN

## 2020-01-19 ASSESSMENT — PAIN - FUNCTIONAL ASSESSMENT: PAIN_FUNCTIONAL_ASSESSMENT: PREVENTS OR INTERFERES SOME ACTIVE ACTIVITIES AND ADLS

## 2020-01-19 ASSESSMENT — PAIN DESCRIPTION - ORIENTATION: ORIENTATION: MID

## 2020-01-19 ASSESSMENT — PAIN DESCRIPTION - FREQUENCY: FREQUENCY: CONTINUOUS

## 2020-01-19 ASSESSMENT — PAIN DESCRIPTION - ONSET: ONSET: ON-GOING

## 2020-01-19 ASSESSMENT — PAIN DESCRIPTION - DESCRIPTORS: DESCRIPTORS: ACHING;CONSTANT;CRAMPING

## 2020-01-19 NOTE — PROGRESS NOTES
Department of Internal Medicine  Nephrology Attending Progress Note    Events reviewed. SUBJECTIVE:  We are following Mrs. Chandrakant Deluna for CKD stage IV. Reports diarrhea. Physical Exam:    VITALS:  BP (!) 167/87   Pulse 94   Temp 97.9 °F (36.6 °C) (Oral)   Resp 18   Ht 5' 4\" (1.626 m)   Wt 141 lb 3.2 oz (64 kg)   LMP 11/16/2019 (Approximate)   SpO2 97%   BMI 24.24 kg/m²   24HR INTAKE/OUTPUT:      Intake/Output Summary (Last 24 hours) at 1/19/2020 0856  Last data filed at 1/19/2020 0649  Gross per 24 hour   Intake 2025 ml   Output --   Net 2025 ml       Constitutional: Awake and alert in no distress  HEENT: PERRLA, mucous membranes moist  Cardiovascular/Edema: Heart sounds are regular rate  Respiratory: Lungs are clear  Gastrointestinal: Abdomen soft nontender  Other: No edema    DATA:    CBC:   Lab Results   Component Value Date    WBC 8.3 01/18/2020    RBC 3.11 01/18/2020    HGB 8.9 01/18/2020    HCT 28.2 01/18/2020    MCV 90.7 01/18/2020    MCH 28.6 01/18/2020    MCHC 31.6 01/18/2020    RDW 14.6 01/18/2020     01/18/2020    MPV 10.1 01/18/2020     CMP:    Lab Results   Component Value Date     01/18/2020    K 5.5 01/18/2020    K 4.0 01/13/2020     01/18/2020    CO2 21 01/18/2020    BUN 51 01/18/2020    CREATININE 3.0 01/18/2020    GFRAA 23 01/18/2020    LABGLOM 23 01/18/2020    GLUCOSE 118 01/18/2020    GLUCOSE 130 05/18/2012    PROT 5.0 01/15/2020    LABALBU 2.4 01/15/2020    LABALBU 4.1 05/18/2012    CALCIUM 8.7 01/18/2020    BILITOT <0.2 01/15/2020    ALKPHOS 115 01/15/2020    AST 17 01/15/2020    ALT 8 01/15/2020     Magnesium:    Lab Results   Component Value Date    MG 2.0 01/15/2020     Phosphorus:    Lab Results   Component Value Date    PHOS 2.4 01/15/2020     Radiology Review:      Chest x-ray January 17, 2020   No acute cardiopulmonary disease.        BRIEF SUMMARY OF INITIAL CONSULT:    Briefly Marjan Cordon is a 56-year-old female with history of CKD stage IV, with

## 2020-01-19 NOTE — PROGRESS NOTES
no acute distress  Skin: warm and dry  Head: normocephalic and atraumatic  Eyes: pupils equal, round, and reactive to light, extraocular eye movements intact, conjunctivae normal  Neck: neck supple and non tender without mass   Pulmonary/Chest: diminished bilaterally, no respiratory distress   Cardiovascular: normal rate, normal S1 and S2   Abdomen: soft, tender, non-distended, normal bowel sounds, no masses or organomegaly  Extremities: no cyanosis, no clubbing and 2+ bilateral lower extremity edema  Neurologic: no cranial nerve deficit and speech normal      Recent Labs     01/18/20  0545 01/18/20  1748 01/19/20  1115    137 135   K 5.9* 5.5* 5.6*    105 103   CO2 21* 21* 21*   BUN 50* 51* 47*   CREATININE 3.0* 3.0* 2.9*   GLUCOSE 312* 118* 294*   CALCIUM 8.1* 8.7 8.2*       Recent Labs     01/17/20  0526 01/18/20  0545 01/19/20  1115   WBC 8.4 8.3 8.4   RBC 2.89* 3.11* 3.11*   HGB 8.4* 8.9* 8.9*   HCT 26.0* 28.2* 28.9*   MCV 90.0 90.7 92.9   MCH 29.1 28.6 28.6   MCHC 32.3 31.6* 30.8*   RDW 14.6 14.6 14.6    233 236   MPV 10.9 10.1 10.7           Radiology:   US DUP LOWER EXTREMITIES BILATERAL VENOUS   Final Result   No evidence of bilateral lower extremity deep venous   thrombus from common femoral vein to proximal calf. XR CHEST PORTABLE   Final Result   No acute cardiopulmonary disease. XR CHEST STANDARD (2 VW)   Final Result   No acute cardiopulmonary disease. CT CHEST HIGH RESOLUTION   Final Result   1. Bibasilar groundglass opacities without parapneumonic effusion. Groundglass opacities are a nonspecific finding and can be the result   of a variety of causes involving pneumonia. NM LUNG VENT/PERFUSION (VQ)   Final Result   Unremarkable ventilation-perfusion study. XR CHEST STANDARD (2 VW)   Final Result   No acute cardiopulmonary disease.          US DUP LOWER EXTREMITIES BILATERAL VENOUS   Final Result   No sonographic evidence of bilateral lower extremity deep   venous thrombosis. XR CHEST 1 VW   Final Result   No acute cardiopulmonary disease. Assessment:  Principal Problem:    Dyspnea on exertion  Active Problems:    Poorly controlled type 1 diabetes mellitus (HCC)    Tobacco use    Essential hypertension    Acute respiratory failure with hypoxia (HCC)    Chest pain, unspecified    Dehydration    Sinus tachycardia    Orthostatic hypotension  Resolved Problems:    * No resolved hospital problems. *      Plan:  1. Shortness of breath:  Echo on 10/30/19 revealed an EF of 55%. D dimer was 281. Unable to have CTA of the chest due to BC. VQ scan negative. Repeat Echo ordered by Cardiology. Rapid flu negative. Respiratory array panel negative. Pulmonology following. Repeat echo on 1/13/2020 showed an EF of 60%. 2. Chest pain:  Heart score 4. Troponin 0.02 EKG with T wave inversions in inferior and lateral leads. Troponin 0.02 -> 0.01 -> <0.01. Cardiology evaluated and no further cardiac work-up at this time. Cardiology has signed off. 3. Community acquired pneumonia vs aspiration:  Continue azithromycin and Zosyn empirically by Pulmonology. Chest x-ray negative. 4. C diff:  Stool positive for c diff. Continue Vancomycin 125 mg oral every 6 hours. 5. Tachycardia: Heart rate currently controlled. Continue metoprolol. 6. Orthostatic hypotension:   Hold Imdur. Last set of orthoses were negative. 7. Acute kidney injury on chronic kidney disease: Creatinine was 3.2 on admission and is now 2.9.   8. Poorly controlled diabetes:  Hgb A1C is 9.8 (8.8 on 10/2/19). Continue Insulin sliding scale with blood sugar checks, hypoglycemic protocol. 9. Gastroparesis:  EGD on 10/11/19 showed a large amount of solid food in the stomach. Gastric emptying studying on 10/12/19 with significantly delayed gastric emptying with persistent uptake within the esophagus.   10. Hypertension:  Continue home medications, except Imdur due to

## 2020-01-20 VITALS
OXYGEN SATURATION: 97 % | BODY MASS INDEX: 24.11 KG/M2 | HEART RATE: 83 BPM | DIASTOLIC BLOOD PRESSURE: 85 MMHG | TEMPERATURE: 98.1 F | HEIGHT: 64 IN | WEIGHT: 141.2 LBS | SYSTOLIC BLOOD PRESSURE: 143 MMHG | RESPIRATION RATE: 18 BRPM

## 2020-01-20 LAB
ANION GAP SERPL CALCULATED.3IONS-SCNC: 10 MMOL/L (ref 7–16)
ANION GAP SERPL CALCULATED.3IONS-SCNC: 8 MMOL/L (ref 7–16)
BUN BLDV-MCNC: 43 MG/DL (ref 6–20)
BUN BLDV-MCNC: 47 MG/DL (ref 6–20)
CALCIUM SERPL-MCNC: 7.9 MG/DL (ref 8.6–10.2)
CALCIUM SERPL-MCNC: 8.6 MG/DL (ref 8.6–10.2)
CHLORIDE BLD-SCNC: 107 MMOL/L (ref 98–107)
CHLORIDE BLD-SCNC: 108 MMOL/L (ref 98–107)
CO2: 21 MMOL/L (ref 22–29)
CO2: 26 MMOL/L (ref 22–29)
CREAT SERPL-MCNC: 2.8 MG/DL (ref 0.5–1)
CREAT SERPL-MCNC: 3 MG/DL (ref 0.5–1)
GFR AFRICAN AMERICAN: 23
GFR AFRICAN AMERICAN: 24
GFR NON-AFRICAN AMERICAN: 23 ML/MIN/1.73
GFR NON-AFRICAN AMERICAN: 24 ML/MIN/1.73
GLUCOSE BLD-MCNC: 216 MG/DL (ref 74–99)
GLUCOSE BLD-MCNC: 91 MG/DL (ref 74–99)
HCT VFR BLD CALC: 26 % (ref 34–48)
HEMOGLOBIN: 8.1 G/DL (ref 11.5–15.5)
MCH RBC QN AUTO: 28.5 PG (ref 26–35)
MCHC RBC AUTO-ENTMCNC: 31.2 % (ref 32–34.5)
MCV RBC AUTO: 91.5 FL (ref 80–99.9)
METER GLUCOSE: 214 MG/DL (ref 74–99)
METER GLUCOSE: 52 MG/DL (ref 74–99)
METER GLUCOSE: 73 MG/DL (ref 74–99)
METER GLUCOSE: 89 MG/DL (ref 74–99)
METER GLUCOSE: 95 MG/DL (ref 74–99)
METER GLUCOSE: 98 MG/DL (ref 74–99)
PDW BLD-RTO: 14.7 FL (ref 11.5–15)
PLATELET # BLD: 251 E9/L (ref 130–450)
PMV BLD AUTO: 10.2 FL (ref 7–12)
POTASSIUM SERPL-SCNC: 5.5 MMOL/L (ref 3.5–5)
POTASSIUM SERPL-SCNC: 5.6 MMOL/L (ref 3.5–5)
PRO-BNP: 8868 PG/ML (ref 0–125)
RBC # BLD: 2.84 E12/L (ref 3.5–5.5)
SODIUM BLD-SCNC: 139 MMOL/L (ref 132–146)
SODIUM BLD-SCNC: 141 MMOL/L (ref 132–146)
WBC # BLD: 7.1 E9/L (ref 4.5–11.5)

## 2020-01-20 PROCEDURE — 6370000000 HC RX 637 (ALT 250 FOR IP): Performed by: INTERNAL MEDICINE

## 2020-01-20 PROCEDURE — 99239 HOSP IP/OBS DSCHRG MGMT >30: CPT | Performed by: INTERNAL MEDICINE

## 2020-01-20 PROCEDURE — 80048 BASIC METABOLIC PNL TOTAL CA: CPT

## 2020-01-20 PROCEDURE — 36415 COLL VENOUS BLD VENIPUNCTURE: CPT

## 2020-01-20 PROCEDURE — 36592 COLLECT BLOOD FROM PICC: CPT

## 2020-01-20 PROCEDURE — 6370000000 HC RX 637 (ALT 250 FOR IP): Performed by: NURSE PRACTITIONER

## 2020-01-20 PROCEDURE — 94640 AIRWAY INHALATION TREATMENT: CPT

## 2020-01-20 PROCEDURE — 6370000000 HC RX 637 (ALT 250 FOR IP): Performed by: UROLOGY

## 2020-01-20 PROCEDURE — APPSS45 APP SPLIT SHARED TIME 31-45 MINUTES: Performed by: NURSE PRACTITIONER

## 2020-01-20 PROCEDURE — 83880 ASSAY OF NATRIURETIC PEPTIDE: CPT

## 2020-01-20 PROCEDURE — 6360000002 HC RX W HCPCS: Performed by: NURSE PRACTITIONER

## 2020-01-20 PROCEDURE — 85027 COMPLETE CBC AUTOMATED: CPT

## 2020-01-20 PROCEDURE — 82962 GLUCOSE BLOOD TEST: CPT

## 2020-01-20 PROCEDURE — 2580000003 HC RX 258: Performed by: NURSE PRACTITIONER

## 2020-01-20 RX ORDER — ATORVASTATIN CALCIUM 40 MG/1
40 TABLET, FILM COATED ORAL NIGHTLY
Qty: 30 TABLET | Refills: 0 | Status: ON HOLD | OUTPATIENT
Start: 2020-01-20 | End: 2020-01-25 | Stop reason: SDUPTHER

## 2020-01-20 RX ORDER — BUMETANIDE 1 MG/1
1 TABLET ORAL DAILY
Qty: 30 TABLET | Refills: 3 | Status: SHIPPED
Start: 2020-01-20 | End: 2020-07-29 | Stop reason: SDUPTHER

## 2020-01-20 RX ORDER — ASPIRIN 81 MG/1
81 TABLET, CHEWABLE ORAL DAILY
Qty: 30 TABLET | Refills: 0 | Status: SHIPPED | OUTPATIENT
Start: 2020-01-21 | End: 2020-06-23 | Stop reason: ALTCHOICE

## 2020-01-20 RX ORDER — SODIUM POLYSTYRENE SULFONATE 15 G/60ML
15 SUSPENSION ORAL; RECTAL ONCE
Status: COMPLETED | OUTPATIENT
Start: 2020-01-20 | End: 2020-01-20

## 2020-01-20 RX ORDER — SODIUM BICARBONATE 650 MG/1
1300 TABLET ORAL DAILY
Qty: 60 TABLET | Refills: 0 | Status: ON HOLD | OUTPATIENT
Start: 2020-01-20 | End: 2020-01-25 | Stop reason: SDUPTHER

## 2020-01-20 RX ORDER — HEPARIN SODIUM (PORCINE) LOCK FLUSH IV SOLN 100 UNIT/ML 100 UNIT/ML
100 SOLUTION INTRAVENOUS PRN
Status: DISCONTINUED | OUTPATIENT
Start: 2020-01-20 | End: 2020-01-20 | Stop reason: HOSPADM

## 2020-01-20 RX ORDER — SODIUM BICARBONATE 650 MG/1
1300 TABLET ORAL DAILY
Status: DISCONTINUED | OUTPATIENT
Start: 2020-01-20 | End: 2020-01-20 | Stop reason: HOSPADM

## 2020-01-20 RX ORDER — SODIUM BICARBONATE 650 MG/1
1300 TABLET ORAL DAILY
Qty: 60 TABLET | Refills: 0 | Status: ON HOLD | OUTPATIENT
Start: 2020-01-20 | End: 2020-01-25 | Stop reason: HOSPADM

## 2020-01-20 RX ORDER — BETHANECHOL CHLORIDE 25 MG/1
25 TABLET ORAL 3 TIMES DAILY
Qty: 90 TABLET | Refills: 0 | Status: ON HOLD | OUTPATIENT
Start: 2020-01-20 | End: 2020-02-15 | Stop reason: HOSPADM

## 2020-01-20 RX ORDER — ERGOCALCIFEROL 1.25 MG/1
50000 CAPSULE ORAL WEEKLY
Qty: 5 CAPSULE | Refills: 0 | Status: ON HOLD | OUTPATIENT
Start: 2020-01-20 | End: 2020-01-25 | Stop reason: SDUPTHER

## 2020-01-20 RX ORDER — AZITHROMYCIN 250 MG/1
250 TABLET, FILM COATED ORAL DAILY
Qty: 2 TABLET | Refills: 0 | Status: ON HOLD | OUTPATIENT
Start: 2020-01-21 | End: 2020-01-25 | Stop reason: HOSPADM

## 2020-01-20 RX ORDER — BUMETANIDE 1 MG/1
1 TABLET ORAL DAILY
Status: DISCONTINUED | OUTPATIENT
Start: 2020-01-20 | End: 2020-01-20 | Stop reason: HOSPADM

## 2020-01-20 RX ADMIN — LEVALBUTEROL HYDROCHLORIDE 1.25 MG: 1.25 SOLUTION RESPIRATORY (INHALATION) at 12:58

## 2020-01-20 RX ADMIN — SODIUM BICARBONATE 650 MG TABLET 1300 MG: at 17:44

## 2020-01-20 RX ADMIN — INSULIN LISPRO 3 UNITS: 100 INJECTION, SOLUTION INTRAVENOUS; SUBCUTANEOUS at 11:32

## 2020-01-20 RX ADMIN — CALCIUM ACETATE 667 MG: 667 CAPSULE ORAL at 09:13

## 2020-01-20 RX ADMIN — CALCIUM ACETATE 667 MG: 667 CAPSULE ORAL at 16:51

## 2020-01-20 RX ADMIN — AMLODIPINE BESYLATE 5 MG: 5 TABLET ORAL at 09:12

## 2020-01-20 RX ADMIN — INSULIN LISPRO 5 UNITS: 100 INJECTION, SOLUTION INTRAVENOUS; SUBCUTANEOUS at 13:58

## 2020-01-20 RX ADMIN — BETHANECHOL CHLORIDE 25 MG: 25 TABLET ORAL at 13:57

## 2020-01-20 RX ADMIN — SODIUM POLYSTYRENE SULFONATE 15 G: 15 SUSPENSION ORAL; RECTAL at 13:56

## 2020-01-20 RX ADMIN — CLONIDINE HYDROCHLORIDE 0.2 MG: 0.2 TABLET ORAL at 13:57

## 2020-01-20 RX ADMIN — LEVALBUTEROL HYDROCHLORIDE 1.25 MG: 1.25 SOLUTION RESPIRATORY (INHALATION) at 10:19

## 2020-01-20 RX ADMIN — CLONIDINE HYDROCHLORIDE 0.2 MG: 0.2 TABLET ORAL at 09:13

## 2020-01-20 RX ADMIN — HYDROCODONE BITARTRATE AND ACETAMINOPHEN 2 TABLET: 5; 325 TABLET ORAL at 11:18

## 2020-01-20 RX ADMIN — CALCIUM ACETATE 667 MG: 667 CAPSULE ORAL at 11:18

## 2020-01-20 RX ADMIN — Medication 125 MG: at 00:21

## 2020-01-20 RX ADMIN — Medication 125 MG: at 11:19

## 2020-01-20 RX ADMIN — Medication 125 MG: at 05:39

## 2020-01-20 RX ADMIN — AZITHROMYCIN MONOHYDRATE 250 MG: 250 TABLET ORAL at 09:13

## 2020-01-20 RX ADMIN — ERGOCALCIFEROL 50000 UNITS: 1.25 CAPSULE ORAL at 09:13

## 2020-01-20 RX ADMIN — INSULIN LISPRO 2 UNITS: 100 INJECTION, SOLUTION INTRAVENOUS; SUBCUTANEOUS at 11:22

## 2020-01-20 RX ADMIN — Medication 125 MG: at 16:51

## 2020-01-20 RX ADMIN — ASPIRIN 81 MG 81 MG: 81 TABLET ORAL at 09:13

## 2020-01-20 RX ADMIN — LORAZEPAM 0.5 MG: 0.5 TABLET ORAL at 15:40

## 2020-01-20 RX ADMIN — METOPROLOL TARTRATE 100 MG: 50 TABLET, FILM COATED ORAL at 09:12

## 2020-01-20 RX ADMIN — Medication 10 ML: at 09:12

## 2020-01-20 RX ADMIN — BUMETANIDE 1 MG: 1 TABLET ORAL at 17:44

## 2020-01-20 RX ADMIN — BETHANECHOL CHLORIDE 25 MG: 25 TABLET ORAL at 09:13

## 2020-01-20 RX ADMIN — PANTOPRAZOLE SODIUM 40 MG: 40 TABLET, DELAYED RELEASE ORAL at 05:39

## 2020-01-20 RX ADMIN — LEVALBUTEROL HYDROCHLORIDE 1.25 MG: 1.25 SOLUTION RESPIRATORY (INHALATION) at 16:55

## 2020-01-20 RX ADMIN — HYDROCODONE BITARTRATE AND ACETAMINOPHEN 2 TABLET: 5; 325 TABLET ORAL at 15:39

## 2020-01-20 ASSESSMENT — PAIN DESCRIPTION - PROGRESSION: CLINICAL_PROGRESSION: NOT CHANGED

## 2020-01-20 ASSESSMENT — PAIN SCALES - GENERAL
PAINLEVEL_OUTOF10: 10
PAINLEVEL_OUTOF10: 9
PAINLEVEL_OUTOF10: 0

## 2020-01-20 NOTE — PROGRESS NOTES
Patient voided 850ml post void residual obtained via bladder scanner with 65ml reading.      Katiana Mckeon RN, BSN

## 2020-01-20 NOTE — CONSULTS
2020 8:22 AM  Service: Urology  Group: [facility] ([facility])    Srinivasa Dias  [id number]     Chief Complaint:    Urinary Retention     History of Present Illness: The patient is a 34 y.o. female patient with a past medical history of diabetes and hypertension, and chronic kidney disease. She presented  to the hospital on 2020 with complaints of shortness of breath and concern for DKA x2 days. She had also complained of increased thirst and urination over the past 2 days as well. She was admitted for hydration, dyspnea, and chest pain. We were consulted for urinary retention. She has 2 documented PVRs of 647 mL's and 835 mL's. Since those bladder scans have been done she has been placed on Bethanechol 25 mg 3 times a day, and her most recent bladder scan was 135 mL's. She states that she is now been voiding on her own and no intermittent catheterization has been needed. She states that she never seen a urologist in the past.  She denies any urological history. She denies any difficulty urinating prior to admission.     Past Medical History:   Diagnosis Date    BC (acute kidney injury) ([facility] 10/1/2019    Cephalgia 10/9/2019    Chronic kidney disease     Depression     Diabetes mellitus ([facility]     Diabetic ketoacidosis ([facility] 2011    Iron deficiency anemia 10/1/2019    MDRO (multiple drug resistant organisms) resistance     MRSA (methicillin resistant Staphylococcus aureus)     back wound abcess    Non compliance w medication regimen 3/30/2016    Other disorders of kidney and ureter     Pregnancy 3/30/2016    16 weeks    Severe pre-eclampsia in third trimester 2016         Past Surgical History:   Procedure Laterality Date    BACK SURGERY      abscess     SECTION      x2    CHOLECYSTECTOMY, LAPAROSCOPIC N/A 2019    CHOLECYSTECTOMY LAPAROSCOPIC performed by Theresa Deshpande MD at [address] [place] [address] N/A 2018    COLONOSCOPY WITH BIOPSY performed by Dennie Malm, MD at [address] N/A 12/18/2018    COLONOSCOPY WITH BIOPSY performed by Angely Moya MD at [address]  1/31/2012    EF 57%    ECHO COMPL W DOP COLOR FLOW  6/10/2013         [facility] ESOPHAGOGASTRODUODENOSCOPY TRANSORAL DIAGNOSTIC N/A 5/30/2018    EGD ESOPHAGOGASTRODUODENOSCOPY performed by Dennie Malm, MD at [address] [facility] TUNNELED VENOUS PORT PLACEMENT  04/2018    UPPER GASTROINTESTINAL ENDOSCOPY  12/18/2018    EGD BIOPSY performed by Angely Moya MD at [place] [address] 10/11/2019    EGD ESOPHAGOGASTRODUODENOSCOPY performed by Amanda Lovett DO at [place] [address]       Medications Prior to Admission:    Medications Prior to Admission: isosorbide mononitrate (IMDUR) 30 MG extended release tablet, Take 1 tablet by mouth daily  cloNIDine (CATAPRES) 0.2 MG tablet, Take 1 tablet by mouth 3 times daily  simethicone (MYLICON) 80 MG chewable tablet, Take 1 tablet by mouth every 6 hours as needed for Flatulence  amLODIPine (NORVASC) 5 MG tablet, Take 1 tablet by mouth daily  furosemide (LASIX) 40 MG tablet, Take 1 tablet by mouth daily  calcium acetate (PHOSLO) 667 MG capsule, Take 667 mg by mouth 3 times daily (with meals)  insulin lispro (HUMALOG) 100 UNIT/ML injection vial, Inject 2 Units into the skin 3 times daily (before meals)  LORazepam (ATIVAN) 0.5 MG tablet, Take 0.5 mg by mouth 2 times daily as needed for Anxiety.   amitriptyline (ELAVIL) 10 MG tablet, Take 1 tablet by mouth nightly  metoprolol (LOPRESSOR) 100 MG tablet, Take 1 tablet by mouth 2 times daily  insulin glargine (BASAGLAR KWIKPEN) 100 UNIT/ML injection pen, Inject 28 Units into the skin nightly New increased dose (Patient taking differently: Inject 6 Units into the skin nightly Before dinner)  insulin aspart (NOVOLOG) 100 UNIT/ML injection vial, Inject 3 Units into the skin 3 times daily (with meals)   insulin aspart (NOVOLOG) 100 UNIT/ML injection vial, Inject 0-10 Units into the skin 3 times daily (before meals) *Per Sliding Scale*  glucose (GLUTOSE) 40 % GEL, Take 15 g by mouth as needed  glucose blood VI test strips (ASCENSIA AUTODISC VI;ONE TOUCH ULTRA TEST VI) strip, Indications: 5x a day Freestyle Lite -Tests 5 times daily and as needed for low glucose. E10.10  LANCETS THIN, by Does not apply route. Indications: cks 5xa a day  Insulin Syringe-Needle U-100 (INSULIN SYRINGE .5CC/30GX1/2\") 30G X 1/2\" 0.5 ML MISC, by Does not apply route. Indications: uses 6-7 a day  famotidine (PEPCID) 20 MG tablet, Take 1 tablet by mouth 2 times daily (Patient taking differently: Take 20 mg by mouth every morning (before breakfast) )    Allergies: Toradol [ketorolac tromethamine] and Hydralazine    Social History:    reports that she has been smoking cigarettes. She has a 3.00 pack-year smoking history. She uses smokeless tobacco. She reports that she does not drink alcohol or use drugs. Family History:   Non-contributory to this Urological problem  family history includes Asthma in her brother and mother; Diabetes in her mother; High Blood Pressure in her father and mother; Hypertension in her mother. Review of Systems:  Constitutional: No fever or chills   Respiratory: negative for cough and hemoptysis  Cardiovascular: negative for chest pain and dyspnea  Gastrointestinal: negative for abdominal pain, diarrhea, nausea and vomiting   Derm: negative for rash and skin lesion(s)  Neurological: negative for seizures and tremors  Musculoskeletal: Negative    Psychiatric: Negative   : As above in the HPI, otherwise negative  All other reviews are negative    Physical Exam:     Vitals:  BP (!) 154/80   Pulse 86   Temp 98 °F (36.7 °C) (Oral)   Resp 14   Ht 5' 4\" (1.626 m)   Wt 141 lb 3.2 oz (64 kg)   LMP 11/16/2019 (Approximate)   SpO2 98%   BMI 24.24 kg/m²     General:  Awake, alert, oriented X 3. No apparent distress.   HEENT:

## 2020-01-20 NOTE — DISCHARGE SUMMARY
01/20/20  0550   WBC 8.3 8.4 7.1   RBC 3.11* 3.11* 2.84*   HGB 8.9* 8.9* 8.1*   HCT 28.2* 28.9* 26.0*   MCV 90.7 92.9 91.5   MCH 28.6 28.6 28.5   MCHC 31.6* 30.8* 31.2*   RDW 14.6 14.6 14.7    236 251   MPV 10.1 10.7 10.2       No results for input(s): POCGLU in the last 72 hours. Imaging:  Xr Chest Standard (2 Vw)    Result Date: 1/13/2020  Location:200 Exam: XR CHEST (2 VW) Comparison: 1/13/2020 History: Dyspnea Findings: PA and lateral views were obtained. Heart size is normal. Pulmonary vessels are nondistended. No acute pulmonary parenchymal consolidation. No acute cardiopulmonary disease. Nm Lung Vent/perfusion (vq)    Result Date: 1/13/2020  Location:200 Exam: NM LUNG VENT/PERFUSION (VQ) Comparison: Current chest x-ray History: Dyspnea Findings: Following intravenous administration of  4.3 mCi of Tc99m labeled MAA, perfusion images were obtained of the pulmonary parenchyma with a gamma camera. No scintigraphic evidence of large subsegmental or segmental pulmonary perfusion defect. Ventilation images were obtained following administration of 800 uCi of aerosolized Tc99m labeled DTPA. Pulmonary ventilation parallels perfusion. No ventilatory-perfusion mismatch. Unremarkable ventilation-perfusion study. Xr Chest 1 Vw    Result Date: 1/13/2020  Location:200 Exam: XR CHEST 1 VIEW Comparison: 11/5/2019 History: Dyspnea Findings: Portable AP upright chest. Heart size is normal. Pulmonary vessels are nondistended. No focal pulmonary parenchymal consolidation. No acute cardiopulmonary disease. Us Dup Lower Extremities Bilateral Venous    Result Date: 1/13/2020  Location:200 Exam: US DUP LOWER EXTREMITIES BILATERAL VENOUS Comparison: 10/14/2019 History: Chest pain Findings: Grayscale, Duplex doppler, color-flow, and spectral analysis sonography of the lower extremity deep venous system obtained from the level of the common femoral vein to the calf.  Unremarkable compressibility and flow noted throughout. No evidence of echogenic intraluminal filling defect. No sonographic evidence of bilateral lower extremity deep venous thrombosis. Patient Instructions:   Current Discharge Medication List      START taking these medications    Details   aspirin 81 MG chewable tablet Take 1 tablet by mouth daily  Qty: 30 tablet, Refills: 0      bethanechol (URECHOLINE) 25 MG tablet Take 1 tablet by mouth 3 times daily  Qty: 90 tablet, Refills: 0      vitamin D (ERGOCALCIFEROL) 1.25 MG (56814 UT) CAPS capsule Take 1 capsule by mouth once a week  Qty: 5 capsule, Refills: 0      atorvastatin (LIPITOR) 40 MG tablet Take 1 tablet by mouth nightly  Qty: 30 tablet, Refills: 0      azithromycin (ZITHROMAX) 250 MG tablet Take 1 tablet by mouth daily for 2 days  Qty: 2 tablet, Refills: 0      bumetanide (BUMEX) 1 MG tablet Take 1 tablet by mouth daily  Qty: 30 tablet, Refills: 3      !! sodium bicarbonate 650 MG tablet Take 2 tablets by mouth daily  Qty: 60 tablet, Refills: 0      !! sodium bicarbonate 650 MG tablet Take 2 tablets by mouth daily  Qty: 60 tablet, Refills: 0      vancomycin (VANCOCIN) 50 mg/mL oral solution Take 2.5 mLs by mouth 4 times daily for 10 days  Qty: 100 mL, Refills: 0       !! - Potential duplicate medications found. Please discuss with provider. CONTINUE these medications which have NOT CHANGED    Details   cloNIDine (CATAPRES) 0.2 MG tablet Take 1 tablet by mouth 3 times daily  Qty: 60 tablet, Refills: 0      simethicone (MYLICON) 80 MG chewable tablet Take 1 tablet by mouth every 6 hours as needed for Flatulence  Qty: 30 tablet, Refills: 0      amLODIPine (NORVASC) 5 MG tablet Take 1 tablet by mouth daily  Qty: 30 tablet, Refills: 0      calcium acetate (PHOSLO) 667 MG capsule Take 667 mg by mouth 3 times daily (with meals)      LORazepam (ATIVAN) 0.5 MG tablet Take 0.5 mg by mouth 2 times daily as needed for Anxiety.       amitriptyline (ELAVIL) 10 MG tablet Take 1 tablet by mouth nightly  Qty: 30 tablet, Refills: 0      metoprolol (LOPRESSOR) 100 MG tablet Take 1 tablet by mouth 2 times daily  Qty: 60 tablet, Refills: 0      insulin glargine (BASAGLAR KWIKPEN) 100 UNIT/ML injection pen Inject 28 Units into the skin nightly New increased dose  Qty: 5 pen, Refills: 3      !! insulin aspart (NOVOLOG) 100 UNIT/ML injection vial Inject 3 Units into the skin 3 times daily (with meals)       ! ! insulin aspart (NOVOLOG) 100 UNIT/ML injection vial Inject 0-10 Units into the skin 3 times daily (before meals) *Per Sliding Scale*      glucose (GLUTOSE) 40 % GEL Take 15 g by mouth as needed  Qty: 45 g, Refills: 1      glucose blood VI test strips (ASCENSIA AUTODISC VI;ONE TOUCH ULTRA TEST VI) strip Indications: 5x a day Freestyle Lite -Tests 5 times daily and as needed for low glucose. E10.10  Qty: 200 strip, Refills: 3      LANCETS THIN by Does not apply route. Indications: cks 5xa a day      Insulin Syringe-Needle U-100 (INSULIN SYRINGE .5CC/30GX1/2\") 30G X 1/2\" 0.5 ML MISC by Does not apply route. Indications: uses 6-7 a day      famotidine (PEPCID) 20 MG tablet Take 1 tablet by mouth 2 times daily  Qty: 60 tablet, Refills: 0       !! - Potential duplicate medications found. Please discuss with provider. STOP taking these medications       isosorbide mononitrate (IMDUR) 30 MG extended release tablet Comments:   Reason for Stopping:         furosemide (LASIX) 40 MG tablet Comments:   Reason for Stopping:         insulin lispro (HUMALOG) 100 UNIT/ML injection vial Comments:   Reason for Stopping:                 Note that more than 30 minutes was spent in preparing discharge papers, discussing discharge with patient, medication review, etc.    NOTE: This report was transcribed using voice recognition software.  Every effort was made to ensure accuracy; however, inadvertent computerized transcription errors may be present.     Signed:  Electronically signed by HEBER Armstrong CNP on

## 2020-01-20 NOTE — PROGRESS NOTES
Department of Internal Medicine  Nephrology Attending Progress Note    Events reviewed. SUBJECTIVE:  We are following Mrs. Lyn Client for CKD stage IV. Reports diarrhea. Feels better    Physical Exam:    VITALS:  BP (!) 154/80   Pulse 86   Temp 98 °F (36.7 °C) (Oral)   Resp 14   Ht 5' 4\" (1.626 m)   Wt 141 lb 3.2 oz (64 kg)   LMP 11/16/2019 (Approximate)   SpO2 98%   BMI 24.24 kg/m²   24HR INTAKE/OUTPUT:      Intake/Output Summary (Last 24 hours) at 1/20/2020 1515  Last data filed at 1/20/2020 1446  Gross per 24 hour   Intake 880 ml   Output 1800 ml   Net -920 ml       Constitutional: Awake and alert in no distress  HEENT: PERRLA, mucous membranes moist  Cardiovascular/Edema: Heart sounds are regular rate  Respiratory: Lungs are clear  Gastrointestinal: Abdomen soft nontender  Other: 1+ bilateral lower extremity edema    DATA:    CBC:   Lab Results   Component Value Date    WBC 7.1 01/20/2020    RBC 2.84 01/20/2020    HGB 8.1 01/20/2020    HCT 26.0 01/20/2020    MCV 91.5 01/20/2020    MCH 28.5 01/20/2020    MCHC 31.2 01/20/2020    RDW 14.7 01/20/2020     01/20/2020    MPV 10.2 01/20/2020     CMP:    Lab Results   Component Value Date     01/20/2020    K 5.6 01/20/2020    K 4.0 01/13/2020     01/20/2020    CO2 21 01/20/2020    BUN 43 01/20/2020    CREATININE 2.8 01/20/2020    GFRAA 24 01/20/2020    LABGLOM 24 01/20/2020    GLUCOSE 216 01/20/2020    GLUCOSE 130 05/18/2012    PROT 5.0 01/15/2020    LABALBU 2.4 01/15/2020    LABALBU 4.1 05/18/2012    CALCIUM 7.9 01/20/2020    BILITOT <0.2 01/15/2020    ALKPHOS 115 01/15/2020    AST 17 01/15/2020    ALT 8 01/15/2020     Magnesium:    Lab Results   Component Value Date    MG 2.0 01/15/2020     Phosphorus:    Lab Results   Component Value Date    PHOS 2.4 01/15/2020     Radiology Review:      Chest x-ray January 17, 2020   No acute cardiopulmonary disease.        BRIEF SUMMARY OF INITIAL CONSULT:    Briefly Deangelo Ca is a 65-year-old female with history of CKD stage IV, with severe proteinuria, baseline creatinine 2.6-3 mg/dL, type I DM, DKA, HTN, back surgery, who was admitted on January 13, 2020 after she presented to the ER with chest pain and shortness of breath. V/Q scan was negative for PE. Cardiology evaluation was negative and she was empirically treated for possible pneumonia with vancomycin and Zithromax. On admission her creatinine was 2.6 mg/dL reason for this consultation. IMPRESSION/RECOMMENDATIONS:       1. CKD stage IV, with severe proteinuria, nephrotic range, due to diabetic nephropathy. Baseline creatinine 2.6-3 mg/dL    2. Hyperkalemia, due to decreased GFR and possibly RTA  3.  HTN, on metoprolol, clonidine, amlodipine  4. C. difficile infection on vancomycin p.o.  5. Normocytic anemia, s/p iv iron and aranesp      Plan:     · Stop IV sodium bicarbonate  · Start Bumex 1 mg p.o. daily for volume control  · Sodium bicarbonate 1300 mg p.o. daily  ·  okay to discharge from renal standpoint, follow-up with Dr. Tere Gresham in 2 weeks

## 2020-01-20 NOTE — PROGRESS NOTES
Noticed some of patient's labs have not been resulted,lab called and stated specimens have never been sent from yesterday. Patient is nurse/unit draw. BNP re-ordered as an add on for BMP that was collected. Instructed patient to notify nursing staff for urine specimen to be sent down.      Vamsi Sherwood RN, BSN

## 2020-01-20 NOTE — PLAN OF CARE
Problem: Pain:  Goal: Control of acute pain  Description  Control of acute pain  1/20/2020 0313 by Jimmy Stanley RN  Outcome: Met This Shift     Problem: Falls - Risk of:  Goal: Will remain free from falls  Description  Will remain free from falls  1/20/2020 0313 by Jimmy Stanley RN  Outcome: Met This Shift     Problem: Falls - Risk of:  Goal: Absence of physical injury  Description  Absence of physical injury  1/20/2020 0313 by Jimmy Stanley RN  Outcome: Met This Shift     Problem: Safety:  Goal: Free from accidental physical injury  Description  Free from accidental physical injury  Outcome: Met This Shift     Problem: Safety:  Goal: Free from intentional harm  Description  Free from intentional harm  Outcome: Met This Shift     Problem: Daily Care:  Goal: Daily care needs are met  Description  Daily care needs are met  Outcome: Met This Shift

## 2020-01-22 ENCOUNTER — APPOINTMENT (OUTPATIENT)
Dept: CT IMAGING | Age: 30
End: 2020-01-22
Payer: COMMERCIAL

## 2020-01-22 ENCOUNTER — HOSPITAL ENCOUNTER (OUTPATIENT)
Age: 30
Setting detail: OBSERVATION
Discharge: HOME | End: 2020-01-25
Attending: EMERGENCY MEDICINE | Admitting: INTERNAL MEDICINE
Payer: COMMERCIAL

## 2020-01-22 PROBLEM — R00.0 TACHYCARDIA: Status: ACTIVE | Noted: 2020-01-22

## 2020-01-22 LAB
ALBUMIN SERPL-MCNC: 2.7 G/DL (ref 3.5–5.2)
ALP BLD-CCNC: 122 U/L (ref 35–104)
ALT SERPL-CCNC: 47 U/L (ref 0–32)
ANION GAP SERPL CALCULATED.3IONS-SCNC: 11 MMOL/L (ref 7–16)
AST SERPL-CCNC: 39 U/L (ref 0–31)
BACTERIA: ABNORMAL /HPF
BASOPHILS ABSOLUTE: 0.08 E9/L (ref 0–0.2)
BASOPHILS RELATIVE PERCENT: 1.1 % (ref 0–2)
BETA-HYDROXYBUTYRATE: 0.39 MMOL/L (ref 0.02–0.27)
BILIRUB SERPL-MCNC: <0.2 MG/DL (ref 0–1.2)
BILIRUBIN URINE: NEGATIVE
BLOOD, URINE: ABNORMAL
BUN BLDV-MCNC: 40 MG/DL (ref 6–20)
CALCIUM SERPL-MCNC: 8.9 MG/DL (ref 8.6–10.2)
CHLORIDE BLD-SCNC: 104 MMOL/L (ref 98–107)
CHP ED QC CHECK: YES
CLARITY: CLEAR
CO2: 25 MMOL/L (ref 22–29)
COLOR: YELLOW
CREAT SERPL-MCNC: 2.9 MG/DL (ref 0.5–1)
EOSINOPHILS ABSOLUTE: 0.26 E9/L (ref 0.05–0.5)
EOSINOPHILS RELATIVE PERCENT: 3.5 % (ref 0–6)
GFR AFRICAN AMERICAN: 23
GFR NON-AFRICAN AMERICAN: 23 ML/MIN/1.73
GLUCOSE BLD-MCNC: 115 MG/DL (ref 74–99)
GLUCOSE BLD-MCNC: 116 MG/DL
GLUCOSE URINE: 500 MG/DL
HCG, URINE, POC: NEGATIVE
HCT VFR BLD CALC: 28 % (ref 34–48)
HEMOGLOBIN: 8.9 G/DL (ref 11.5–15.5)
IMMATURE GRANULOCYTES #: 0.02 E9/L
IMMATURE GRANULOCYTES %: 0.3 % (ref 0–5)
KETONES, URINE: NEGATIVE MG/DL
LACTIC ACID: 0.8 MMOL/L (ref 0.5–2.2)
LEUKOCYTE ESTERASE, URINE: ABNORMAL
LIPASE: 6 U/L (ref 13–60)
LYMPHOCYTES ABSOLUTE: 1.35 E9/L (ref 1.5–4)
LYMPHOCYTES RELATIVE PERCENT: 18.3 % (ref 20–42)
Lab: NORMAL
MCH RBC QN AUTO: 29 PG (ref 26–35)
MCHC RBC AUTO-ENTMCNC: 31.8 % (ref 32–34.5)
MCV RBC AUTO: 91.2 FL (ref 80–99.9)
METER GLUCOSE: 116 MG/DL (ref 74–99)
METER GLUCOSE: 152 MG/DL (ref 74–99)
METER GLUCOSE: 192 MG/DL (ref 74–99)
MONOCYTES ABSOLUTE: 0.45 E9/L (ref 0.1–0.95)
MONOCYTES RELATIVE PERCENT: 6.1 % (ref 2–12)
NEGATIVE QC PASS/FAIL: NORMAL
NEUTROPHILS ABSOLUTE: 5.2 E9/L (ref 1.8–7.3)
NEUTROPHILS RELATIVE PERCENT: 70.7 % (ref 43–80)
NITRITE, URINE: NEGATIVE
PDW BLD-RTO: 15.3 FL (ref 11.5–15)
PH UA: 6 (ref 5–9)
PH VENOUS: 7.32 (ref 7.35–7.45)
PLATELET # BLD: 294 E9/L (ref 130–450)
PMV BLD AUTO: 9.4 FL (ref 7–12)
POSITIVE QC PASS/FAIL: NORMAL
POTASSIUM SERPL-SCNC: 4.2 MMOL/L (ref 3.5–5)
PROTEIN UA: >=300 MG/DL
RBC # BLD: 3.07 E12/L (ref 3.5–5.5)
RBC UA: ABNORMAL /HPF (ref 0–2)
SODIUM BLD-SCNC: 140 MMOL/L (ref 132–146)
SPECIFIC GRAVITY UA: 1.02 (ref 1–1.03)
TOTAL PROTEIN: 6.3 G/DL (ref 6.4–8.3)
UROBILINOGEN, URINE: 0.2 E.U./DL
WBC # BLD: 7.4 E9/L (ref 4.5–11.5)
WBC UA: ABNORMAL /HPF (ref 0–5)

## 2020-01-22 PROCEDURE — 82010 KETONE BODYS QUAN: CPT

## 2020-01-22 PROCEDURE — 74176 CT ABD & PELVIS W/O CONTRAST: CPT

## 2020-01-22 PROCEDURE — 6360000002 HC RX W HCPCS: Performed by: EMERGENCY MEDICINE

## 2020-01-22 PROCEDURE — 96375 TX/PRO/DX INJ NEW DRUG ADDON: CPT

## 2020-01-22 PROCEDURE — 6370000000 HC RX 637 (ALT 250 FOR IP): Performed by: NURSE PRACTITIONER

## 2020-01-22 PROCEDURE — G0378 HOSPITAL OBSERVATION PER HR: HCPCS

## 2020-01-22 PROCEDURE — 81001 URINALYSIS AUTO W/SCOPE: CPT

## 2020-01-22 PROCEDURE — 96374 THER/PROPH/DIAG INJ IV PUSH: CPT

## 2020-01-22 PROCEDURE — 82800 BLOOD PH: CPT

## 2020-01-22 PROCEDURE — 82962 GLUCOSE BLOOD TEST: CPT

## 2020-01-22 PROCEDURE — 85025 COMPLETE CBC W/AUTO DIFF WBC: CPT

## 2020-01-22 PROCEDURE — 99220 PR INITIAL OBSERVATION CARE/DAY 70 MINUTES: CPT | Performed by: NURSE PRACTITIONER

## 2020-01-22 PROCEDURE — 83690 ASSAY OF LIPASE: CPT

## 2020-01-22 PROCEDURE — 6360000002 HC RX W HCPCS: Performed by: STUDENT IN AN ORGANIZED HEALTH CARE EDUCATION/TRAINING PROGRAM

## 2020-01-22 PROCEDURE — 2500000003 HC RX 250 WO HCPCS: Performed by: STUDENT IN AN ORGANIZED HEALTH CARE EDUCATION/TRAINING PROGRAM

## 2020-01-22 PROCEDURE — 80053 COMPREHEN METABOLIC PANEL: CPT

## 2020-01-22 PROCEDURE — 36415 COLL VENOUS BLD VENIPUNCTURE: CPT

## 2020-01-22 PROCEDURE — 99285 EMERGENCY DEPT VISIT HI MDM: CPT

## 2020-01-22 PROCEDURE — 83605 ASSAY OF LACTIC ACID: CPT

## 2020-01-22 PROCEDURE — 70450 CT HEAD/BRAIN W/O DYE: CPT

## 2020-01-22 PROCEDURE — 2580000003 HC RX 258: Performed by: STUDENT IN AN ORGANIZED HEALTH CARE EDUCATION/TRAINING PROGRAM

## 2020-01-22 RX ORDER — SODIUM BICARBONATE 650 MG/1
1300 TABLET ORAL DAILY
Status: DISCONTINUED | OUTPATIENT
Start: 2020-01-23 | End: 2020-01-25 | Stop reason: HOSPADM

## 2020-01-22 RX ORDER — BUMETANIDE 1 MG/1
1 TABLET ORAL DAILY
Status: DISCONTINUED | OUTPATIENT
Start: 2020-01-23 | End: 2020-01-23

## 2020-01-22 RX ORDER — SODIUM CHLORIDE 0.9 % (FLUSH) 0.9 %
10 SYRINGE (ML) INJECTION PRN
Status: DISCONTINUED | OUTPATIENT
Start: 2020-01-22 | End: 2020-01-25 | Stop reason: HOSPADM

## 2020-01-22 RX ORDER — HYDROCODONE BITARTRATE AND ACETAMINOPHEN 5; 325 MG/1; MG/1
2 TABLET ORAL EVERY 4 HOURS PRN
Status: COMPLETED | OUTPATIENT
Start: 2020-01-22 | End: 2020-01-24

## 2020-01-22 RX ORDER — NICOTINE POLACRILEX 4 MG
15 LOZENGE BUCCAL PRN
Status: DISCONTINUED | OUTPATIENT
Start: 2020-01-22 | End: 2020-01-23 | Stop reason: SDUPTHER

## 2020-01-22 RX ORDER — ONDANSETRON 2 MG/ML
4 INJECTION INTRAMUSCULAR; INTRAVENOUS ONCE
Status: COMPLETED | OUTPATIENT
Start: 2020-01-22 | End: 2020-01-22

## 2020-01-22 RX ORDER — SIMETHICONE 80 MG
80 TABLET,CHEWABLE ORAL EVERY 6 HOURS PRN
Status: DISCONTINUED | OUTPATIENT
Start: 2020-01-22 | End: 2020-01-23

## 2020-01-22 RX ORDER — BETHANECHOL CHLORIDE 25 MG/1
25 TABLET ORAL 3 TIMES DAILY
Status: DISCONTINUED | OUTPATIENT
Start: 2020-01-22 | End: 2020-01-23

## 2020-01-22 RX ORDER — AMITRIPTYLINE HYDROCHLORIDE 10 MG/1
10 TABLET, FILM COATED ORAL NIGHTLY
Status: DISCONTINUED | OUTPATIENT
Start: 2020-01-22 | End: 2020-01-25 | Stop reason: HOSPADM

## 2020-01-22 RX ORDER — 0.9 % SODIUM CHLORIDE 0.9 %
1000 INTRAVENOUS SOLUTION INTRAVENOUS ONCE
Status: COMPLETED | OUTPATIENT
Start: 2020-01-22 | End: 2020-01-22

## 2020-01-22 RX ORDER — AMLODIPINE BESYLATE 5 MG/1
5 TABLET ORAL DAILY
Status: DISCONTINUED | OUTPATIENT
Start: 2020-01-23 | End: 2020-01-23

## 2020-01-22 RX ORDER — SODIUM BICARBONATE 650 MG/1
1300 TABLET ORAL DAILY
Status: DISCONTINUED | OUTPATIENT
Start: 2020-01-23 | End: 2020-01-22 | Stop reason: SDUPTHER

## 2020-01-22 RX ORDER — PROCHLORPERAZINE EDISYLATE 5 MG/ML
10 INJECTION INTRAMUSCULAR; INTRAVENOUS ONCE
Status: COMPLETED | OUTPATIENT
Start: 2020-01-22 | End: 2020-01-22

## 2020-01-22 RX ORDER — CLONIDINE HYDROCHLORIDE 0.2 MG/1
0.2 TABLET ORAL 3 TIMES DAILY
Status: DISCONTINUED | OUTPATIENT
Start: 2020-01-23 | End: 2020-01-22

## 2020-01-22 RX ORDER — ASPIRIN 81 MG/1
81 TABLET, CHEWABLE ORAL DAILY
Status: DISCONTINUED | OUTPATIENT
Start: 2020-01-23 | End: 2020-01-25 | Stop reason: HOSPADM

## 2020-01-22 RX ORDER — LABETALOL HYDROCHLORIDE 5 MG/ML
10 INJECTION, SOLUTION INTRAVENOUS ONCE
Status: COMPLETED | OUTPATIENT
Start: 2020-01-22 | End: 2020-01-22

## 2020-01-22 RX ORDER — AZITHROMYCIN 250 MG/1
250 TABLET, FILM COATED ORAL DAILY
Status: DISCONTINUED | OUTPATIENT
Start: 2020-01-23 | End: 2020-01-23

## 2020-01-22 RX ORDER — HYDROCODONE BITARTRATE AND ACETAMINOPHEN 5; 325 MG/1; MG/1
1 TABLET ORAL ONCE
Status: COMPLETED | OUTPATIENT
Start: 2020-01-22 | End: 2020-01-22

## 2020-01-22 RX ORDER — LORAZEPAM 0.5 MG/1
0.5 TABLET ORAL 2 TIMES DAILY PRN
Status: DISCONTINUED | OUTPATIENT
Start: 2020-01-22 | End: 2020-01-25 | Stop reason: HOSPADM

## 2020-01-22 RX ORDER — ATORVASTATIN CALCIUM 40 MG/1
40 TABLET, FILM COATED ORAL NIGHTLY
Status: DISCONTINUED | OUTPATIENT
Start: 2020-01-22 | End: 2020-01-25 | Stop reason: HOSPADM

## 2020-01-22 RX ORDER — CLONIDINE HYDROCHLORIDE 0.2 MG/1
0.2 TABLET ORAL 3 TIMES DAILY
Status: DISCONTINUED | OUTPATIENT
Start: 2020-01-22 | End: 2020-01-23

## 2020-01-22 RX ORDER — DEXTROSE MONOHYDRATE 25 G/50ML
12.5 INJECTION, SOLUTION INTRAVENOUS PRN
Status: DISCONTINUED | OUTPATIENT
Start: 2020-01-22 | End: 2020-01-23 | Stop reason: SDUPTHER

## 2020-01-22 RX ORDER — SODIUM CHLORIDE 0.9 % (FLUSH) 0.9 %
10 SYRINGE (ML) INJECTION EVERY 12 HOURS SCHEDULED
Status: DISCONTINUED | OUTPATIENT
Start: 2020-01-22 | End: 2020-01-25 | Stop reason: HOSPADM

## 2020-01-22 RX ORDER — DIPHENHYDRAMINE HYDROCHLORIDE 50 MG/ML
25 INJECTION INTRAMUSCULAR; INTRAVENOUS ONCE
Status: COMPLETED | OUTPATIENT
Start: 2020-01-22 | End: 2020-01-22

## 2020-01-22 RX ORDER — HYDROCODONE BITARTRATE AND ACETAMINOPHEN 5; 325 MG/1; MG/1
1 TABLET ORAL EVERY 4 HOURS PRN
Status: COMPLETED | OUTPATIENT
Start: 2020-01-22 | End: 2020-01-24

## 2020-01-22 RX ORDER — ONDANSETRON 2 MG/ML
4 INJECTION INTRAMUSCULAR; INTRAVENOUS EVERY 6 HOURS PRN
Status: DISCONTINUED | OUTPATIENT
Start: 2020-01-22 | End: 2020-01-25 | Stop reason: HOSPADM

## 2020-01-22 RX ORDER — METOPROLOL TARTRATE 50 MG/1
100 TABLET, FILM COATED ORAL 2 TIMES DAILY
Status: DISCONTINUED | OUTPATIENT
Start: 2020-01-22 | End: 2020-01-25 | Stop reason: HOSPADM

## 2020-01-22 RX ORDER — DEXTROSE MONOHYDRATE 50 MG/ML
100 INJECTION, SOLUTION INTRAVENOUS PRN
Status: DISCONTINUED | OUTPATIENT
Start: 2020-01-22 | End: 2020-01-23 | Stop reason: SDUPTHER

## 2020-01-22 RX ORDER — FAMOTIDINE 20 MG/1
20 TABLET, FILM COATED ORAL
Status: DISCONTINUED | OUTPATIENT
Start: 2020-01-23 | End: 2020-01-25 | Stop reason: HOSPADM

## 2020-01-22 RX ORDER — HEPARIN SODIUM 5000 [USP'U]/ML
5000 INJECTION, SOLUTION INTRAVENOUS; SUBCUTANEOUS EVERY 8 HOURS SCHEDULED
Status: DISCONTINUED | OUTPATIENT
Start: 2020-01-22 | End: 2020-01-25 | Stop reason: HOSPADM

## 2020-01-22 RX ADMIN — DIPHENHYDRAMINE HYDROCHLORIDE 25 MG: 50 INJECTION, SOLUTION INTRAMUSCULAR; INTRAVENOUS at 17:10

## 2020-01-22 RX ADMIN — LORAZEPAM 0.5 MG: 0.5 TABLET ORAL at 21:47

## 2020-01-22 RX ADMIN — METOPROLOL TARTRATE 100 MG: 50 TABLET, FILM COATED ORAL at 21:46

## 2020-01-22 RX ADMIN — Medication 125 MG: at 21:56

## 2020-01-22 RX ADMIN — HYDROCODONE BITARTRATE AND ACETAMINOPHEN 1 TABLET: 5; 325 TABLET ORAL at 21:46

## 2020-01-22 RX ADMIN — SODIUM CHLORIDE 1000 ML: 9 INJECTION, SOLUTION INTRAVENOUS at 18:04

## 2020-01-22 RX ADMIN — INSULIN LISPRO 1 UNITS: 100 INJECTION, SOLUTION INTRAVENOUS; SUBCUTANEOUS at 22:03

## 2020-01-22 RX ADMIN — BETHANECHOL CHLORIDE 25 MG: 25 TABLET ORAL at 21:55

## 2020-01-22 RX ADMIN — LABETALOL HYDROCHLORIDE 10 MG: 5 INJECTION, SOLUTION INTRAVENOUS at 18:04

## 2020-01-22 RX ADMIN — ATORVASTATIN CALCIUM 40 MG: 40 TABLET, FILM COATED ORAL at 21:47

## 2020-01-22 RX ADMIN — PROCHLORPERAZINE EDISYLATE 10 MG: 5 INJECTION INTRAMUSCULAR; INTRAVENOUS at 17:10

## 2020-01-22 RX ADMIN — AMITRIPTYLINE HYDROCHLORIDE 10 MG: 10 TABLET, FILM COATED ORAL at 21:55

## 2020-01-22 RX ADMIN — SODIUM CHLORIDE 1000 ML: 9 INJECTION, SOLUTION INTRAVENOUS at 15:46

## 2020-01-22 RX ADMIN — ONDANSETRON 4 MG: 2 INJECTION INTRAMUSCULAR; INTRAVENOUS at 15:46

## 2020-01-22 ASSESSMENT — ENCOUNTER SYMPTOMS
SHORTNESS OF BREATH: 0
DIARRHEA: 0
BACK PAIN: 0
COLOR CHANGE: 0
CHEST TIGHTNESS: 0
CONSTIPATION: 0
COUGH: 0
ABDOMINAL PAIN: 1
EYE PAIN: 0
VOMITING: 0
NAUSEA: 1
WHEEZING: 0
SINUS PAIN: 0

## 2020-01-22 ASSESSMENT — PAIN DESCRIPTION - PAIN TYPE
TYPE: ACUTE PAIN

## 2020-01-22 ASSESSMENT — PAIN DESCRIPTION - DESCRIPTORS: DESCRIPTORS: HEADACHE

## 2020-01-22 ASSESSMENT — PAIN DESCRIPTION - ONSET: ONSET: ON-GOING

## 2020-01-22 ASSESSMENT — PAIN SCALES - GENERAL
PAINLEVEL_OUTOF10: 10

## 2020-01-22 ASSESSMENT — PAIN DESCRIPTION - FREQUENCY: FREQUENCY: CONTINUOUS

## 2020-01-22 ASSESSMENT — PAIN DESCRIPTION - ORIENTATION: ORIENTATION: ANTERIOR

## 2020-01-22 ASSESSMENT — PAIN DESCRIPTION - PROGRESSION: CLINICAL_PROGRESSION: NOT CHANGED

## 2020-01-22 ASSESSMENT — PAIN DESCRIPTION - LOCATION
LOCATION: HEAD
LOCATION: HEAD

## 2020-01-22 NOTE — ED NOTES
Patient states \" my sugar is dropping- you need to recheck it\" BGL is [address]  01/22/20 9910

## 2020-01-22 NOTE — ED NOTES
Patient aware need for urine specimen, bsc in room, SO aware as well. Will continue to monitor.       Alisha Nogueira RN  01/22/20 [id number]

## 2020-01-23 ENCOUNTER — APPOINTMENT (OUTPATIENT)
Dept: ULTRASOUND IMAGING | Age: 30
End: 2020-01-23
Payer: COMMERCIAL

## 2020-01-23 LAB
AMPHETAMINE SCREEN, URINE: NOT DETECTED
ANION GAP SERPL CALCULATED.3IONS-SCNC: 12 MMOL/L (ref 7–16)
BARBITURATE SCREEN URINE: NOT DETECTED
BASOPHILS ABSOLUTE: 0.1 E9/L (ref 0–0.2)
BASOPHILS RELATIVE PERCENT: 1.7 % (ref 0–2)
BENZODIAZEPINE SCREEN, URINE: NOT DETECTED
BUN BLDV-MCNC: 36 MG/DL (ref 6–20)
CALCIUM SERPL-MCNC: 8.1 MG/DL (ref 8.6–10.2)
CANNABINOID SCREEN URINE: NOT DETECTED
CHLORIDE BLD-SCNC: 105 MMOL/L (ref 98–107)
CO2: 21 MMOL/L (ref 22–29)
COCAINE METABOLITE SCREEN URINE: NOT DETECTED
CREAT SERPL-MCNC: 2.6 MG/DL (ref 0.5–1)
EOSINOPHILS ABSOLUTE: 0.17 E9/L (ref 0.05–0.5)
EOSINOPHILS RELATIVE PERCENT: 2.9 % (ref 0–6)
FENTANYL SCREEN, URINE: NOT DETECTED
GFR AFRICAN AMERICAN: 27
GFR NON-AFRICAN AMERICAN: 27 ML/MIN/1.73
GLUCOSE BLD-MCNC: 417 MG/DL (ref 74–99)
HBA1C MFR BLD: 8.8 % (ref 4–5.6)
HCT VFR BLD CALC: 27.7 % (ref 34–48)
HEMOGLOBIN: 8.6 G/DL (ref 11.5–15.5)
IMMATURE GRANULOCYTES #: 0.01 E9/L
IMMATURE GRANULOCYTES %: 0.2 % (ref 0–5)
IRON: 59 MCG/DL (ref 37–145)
LYMPHOCYTES ABSOLUTE: 1.46 E9/L (ref 1.5–4)
LYMPHOCYTES RELATIVE PERCENT: 25.3 % (ref 20–42)
Lab: ABNORMAL
MCH RBC QN AUTO: 29.2 PG (ref 26–35)
MCHC RBC AUTO-ENTMCNC: 31 % (ref 32–34.5)
MCV RBC AUTO: 93.9 FL (ref 80–99.9)
METER GLUCOSE: 129 MG/DL (ref 74–99)
METER GLUCOSE: 214 MG/DL (ref 74–99)
METER GLUCOSE: 221 MG/DL (ref 74–99)
METER GLUCOSE: 392 MG/DL (ref 74–99)
METER GLUCOSE: 42 MG/DL (ref 74–99)
METHADONE SCREEN, URINE: NOT DETECTED
MONOCYTES ABSOLUTE: 0.43 E9/L (ref 0.1–0.95)
MONOCYTES RELATIVE PERCENT: 7.4 % (ref 2–12)
NEUTROPHILS ABSOLUTE: 3.61 E9/L (ref 1.8–7.3)
NEUTROPHILS RELATIVE PERCENT: 62.5 % (ref 43–80)
OPIATE SCREEN URINE: POSITIVE
OXYCODONE URINE: NOT DETECTED
PDW BLD-RTO: 15.9 FL (ref 11.5–15)
PHENCYCLIDINE SCREEN URINE: NOT DETECTED
PLATELET # BLD: 232 E9/L (ref 130–450)
PMV BLD AUTO: 9.6 FL (ref 7–12)
POTASSIUM REFLEX MAGNESIUM: 4.4 MMOL/L (ref 3.5–5)
PROCALCITONIN: 0.09 NG/ML (ref 0–0.08)
RBC # BLD: 2.95 E12/L (ref 3.5–5.5)
SODIUM BLD-SCNC: 138 MMOL/L (ref 132–146)
WBC # BLD: 5.8 E9/L (ref 4.5–11.5)

## 2020-01-23 PROCEDURE — 84145 PROCALCITONIN (PCT): CPT

## 2020-01-23 PROCEDURE — 76770 US EXAM ABDO BACK WALL COMP: CPT

## 2020-01-23 PROCEDURE — 36415 COLL VENOUS BLD VENIPUNCTURE: CPT

## 2020-01-23 PROCEDURE — 80074 ACUTE HEPATITIS PANEL: CPT

## 2020-01-23 PROCEDURE — 6370000000 HC RX 637 (ALT 250 FOR IP): Performed by: INTERNAL MEDICINE

## 2020-01-23 PROCEDURE — 94664 DEMO&/EVAL PT USE INHALER: CPT

## 2020-01-23 PROCEDURE — 83036 HEMOGLOBIN GLYCOSYLATED A1C: CPT

## 2020-01-23 PROCEDURE — G0378 HOSPITAL OBSERVATION PER HR: HCPCS

## 2020-01-23 PROCEDURE — 80307 DRUG TEST PRSMV CHEM ANLYZR: CPT

## 2020-01-23 PROCEDURE — 6370000000 HC RX 637 (ALT 250 FOR IP): Performed by: NURSE PRACTITIONER

## 2020-01-23 PROCEDURE — 83540 ASSAY OF IRON: CPT

## 2020-01-23 PROCEDURE — 96372 THER/PROPH/DIAG INJ SC/IM: CPT

## 2020-01-23 PROCEDURE — 6360000002 HC RX W HCPCS: Performed by: INTERNAL MEDICINE

## 2020-01-23 PROCEDURE — 85025 COMPLETE CBC W/AUTO DIFF WBC: CPT

## 2020-01-23 PROCEDURE — 80048 BASIC METABOLIC PNL TOTAL CA: CPT

## 2020-01-23 PROCEDURE — 82962 GLUCOSE BLOOD TEST: CPT

## 2020-01-23 PROCEDURE — 2580000003 HC RX 258: Performed by: NURSE PRACTITIONER

## 2020-01-23 PROCEDURE — 99225 PR SBSQ OBSERVATION CARE/DAY 25 MINUTES: CPT | Performed by: INTERNAL MEDICINE

## 2020-01-23 PROCEDURE — 82088 ASSAY OF ALDOSTERONE: CPT

## 2020-01-23 RX ORDER — DEXTROSE MONOHYDRATE 50 MG/ML
100 INJECTION, SOLUTION INTRAVENOUS PRN
Status: DISCONTINUED | OUTPATIENT
Start: 2020-01-23 | End: 2020-01-25 | Stop reason: HOSPADM

## 2020-01-23 RX ORDER — HYDRALAZINE HYDROCHLORIDE 20 MG/ML
10 INJECTION INTRAMUSCULAR; INTRAVENOUS EVERY 6 HOURS PRN
Status: DISCONTINUED | OUTPATIENT
Start: 2020-01-23 | End: 2020-01-25 | Stop reason: HOSPADM

## 2020-01-23 RX ORDER — IPRATROPIUM BROMIDE AND ALBUTEROL SULFATE 2.5; .5 MG/3ML; MG/3ML
1 SOLUTION RESPIRATORY (INHALATION)
Status: DISCONTINUED | OUTPATIENT
Start: 2020-01-23 | End: 2020-01-25 | Stop reason: HOSPADM

## 2020-01-23 RX ORDER — BUMETANIDE 1 MG/1
0.5 TABLET ORAL 2 TIMES DAILY
Status: DISCONTINUED | OUTPATIENT
Start: 2020-01-23 | End: 2020-01-25 | Stop reason: HOSPADM

## 2020-01-23 RX ORDER — HYDRALAZINE HYDROCHLORIDE 50 MG/1
50 TABLET, FILM COATED ORAL EVERY 8 HOURS SCHEDULED
Status: DISCONTINUED | OUTPATIENT
Start: 2020-01-23 | End: 2020-01-23

## 2020-01-23 RX ORDER — CLONIDINE HYDROCHLORIDE 0.2 MG/1
0.2 TABLET ORAL 3 TIMES DAILY
Status: DISCONTINUED | OUTPATIENT
Start: 2020-01-23 | End: 2020-01-23

## 2020-01-23 RX ORDER — LACTOBACILLUS RHAMNOSUS GG 10B CELL
1 CAPSULE ORAL DAILY
Status: DISCONTINUED | OUTPATIENT
Start: 2020-01-23 | End: 2020-01-25 | Stop reason: HOSPADM

## 2020-01-23 RX ORDER — NICOTINE POLACRILEX 4 MG
15 LOZENGE BUCCAL PRN
Status: DISCONTINUED | OUTPATIENT
Start: 2020-01-23 | End: 2020-01-23

## 2020-01-23 RX ORDER — HYDRALAZINE HYDROCHLORIDE 50 MG/1
100 TABLET, FILM COATED ORAL EVERY 6 HOURS
Status: DISCONTINUED | OUTPATIENT
Start: 2020-01-23 | End: 2020-01-25

## 2020-01-23 RX ORDER — CLONIDINE HYDROCHLORIDE 0.1 MG/1
0.1 TABLET ORAL 2 TIMES DAILY
Status: DISCONTINUED | OUTPATIENT
Start: 2020-01-23 | End: 2020-01-23

## 2020-01-23 RX ORDER — DEXTROSE MONOHYDRATE 25 G/50ML
12.5 INJECTION, SOLUTION INTRAVENOUS PRN
Status: DISCONTINUED | OUTPATIENT
Start: 2020-01-23 | End: 2020-01-23

## 2020-01-23 RX ORDER — AMLODIPINE BESYLATE 10 MG/1
10 TABLET ORAL DAILY
Status: DISCONTINUED | OUTPATIENT
Start: 2020-01-24 | End: 2020-01-24

## 2020-01-23 RX ORDER — CYANOCOBALAMIN 1000 UG/ML
1000 INJECTION INTRAMUSCULAR; SUBCUTANEOUS ONCE
Status: COMPLETED | OUTPATIENT
Start: 2020-01-23 | End: 2020-01-23

## 2020-01-23 RX ADMIN — CALCIUM ACETATE 667 MG: 667 CAPSULE ORAL at 12:33

## 2020-01-23 RX ADMIN — INSULIN LISPRO 2 UNITS: 100 INJECTION, SOLUTION INTRAVENOUS; SUBCUTANEOUS at 20:41

## 2020-01-23 RX ADMIN — INSULIN LISPRO 4 UNITS: 100 INJECTION, SOLUTION INTRAVENOUS; SUBCUTANEOUS at 18:21

## 2020-01-23 RX ADMIN — INSULIN LISPRO 10 UNITS: 100 INJECTION, SOLUTION INTRAVENOUS; SUBCUTANEOUS at 12:32

## 2020-01-23 RX ADMIN — LORAZEPAM 0.5 MG: 0.5 TABLET ORAL at 10:48

## 2020-01-23 RX ADMIN — CYANOCOBALAMIN 1000 MCG: 1000 INJECTION, SOLUTION INTRAMUSCULAR at 12:33

## 2020-01-23 RX ADMIN — BETHANECHOL CHLORIDE 25 MG: 25 TABLET ORAL at 08:25

## 2020-01-23 RX ADMIN — CLONIDINE HYDROCHLORIDE 0.3 MG: 0.1 TABLET ORAL at 13:47

## 2020-01-23 RX ADMIN — ASPIRIN 81 MG 81 MG: 81 TABLET ORAL at 08:25

## 2020-01-23 RX ADMIN — CLONIDINE HYDROCHLORIDE 0.2 MG: 0.2 TABLET ORAL at 06:34

## 2020-01-23 RX ADMIN — BUMETANIDE 0.5 MG: 1 TABLET ORAL at 20:45

## 2020-01-23 RX ADMIN — ATORVASTATIN CALCIUM 40 MG: 40 TABLET, FILM COATED ORAL at 20:46

## 2020-01-23 RX ADMIN — CALCIUM ACETATE 667 MG: 667 CAPSULE ORAL at 08:25

## 2020-01-23 RX ADMIN — CLONIDINE HYDROCHLORIDE 0.3 MG: 0.1 TABLET ORAL at 20:45

## 2020-01-23 RX ADMIN — AMLODIPINE BESYLATE 5 MG: 5 TABLET ORAL at 08:25

## 2020-01-23 RX ADMIN — IPRATROPIUM BROMIDE AND ALBUTEROL SULFATE 1 AMPULE: .5; 3 SOLUTION RESPIRATORY (INHALATION) at 19:00

## 2020-01-23 RX ADMIN — METOPROLOL TARTRATE 100 MG: 50 TABLET, FILM COATED ORAL at 08:25

## 2020-01-23 RX ADMIN — CLONIDINE HYDROCHLORIDE 0.2 MG: 0.2 TABLET ORAL at 00:01

## 2020-01-23 RX ADMIN — FAMOTIDINE 20 MG: 20 TABLET, FILM COATED ORAL at 06:14

## 2020-01-23 RX ADMIN — SODIUM CHLORIDE, PRESERVATIVE FREE 10 ML: 5 INJECTION INTRAVENOUS at 20:48

## 2020-01-23 RX ADMIN — Medication 125 MG: at 12:33

## 2020-01-23 RX ADMIN — LORAZEPAM 0.5 MG: 0.5 TABLET ORAL at 20:46

## 2020-01-23 RX ADMIN — METOPROLOL TARTRATE 100 MG: 50 TABLET, FILM COATED ORAL at 20:45

## 2020-01-23 RX ADMIN — HYDRALAZINE HYDROCHLORIDE 100 MG: 50 TABLET ORAL at 19:57

## 2020-01-23 RX ADMIN — Medication 125 MG: at 20:48

## 2020-01-23 RX ADMIN — HYDROCODONE BITARTRATE AND ACETAMINOPHEN 2 TABLET: 5; 325 TABLET ORAL at 06:13

## 2020-01-23 RX ADMIN — Medication 125 MG: at 08:25

## 2020-01-23 RX ADMIN — CALCIUM ACETATE 667 MG: 667 CAPSULE ORAL at 18:21

## 2020-01-23 RX ADMIN — HYDRALAZINE HYDROCHLORIDE 50 MG: 50 TABLET ORAL at 13:47

## 2020-01-23 RX ADMIN — SODIUM BICARBONATE 650 MG TABLET 1300 MG: at 08:25

## 2020-01-23 RX ADMIN — Medication 1 CAPSULE: at 12:33

## 2020-01-23 RX ADMIN — Medication 125 MG: at 18:21

## 2020-01-23 RX ADMIN — AMITRIPTYLINE HYDROCHLORIDE 10 MG: 10 TABLET, FILM COATED ORAL at 20:46

## 2020-01-23 RX ADMIN — BUMETANIDE 1 MG: 1 TABLET ORAL at 08:26

## 2020-01-23 RX ADMIN — HYDROCODONE BITARTRATE AND ACETAMINOPHEN 2 TABLET: 5; 325 TABLET ORAL at 18:21

## 2020-01-23 RX ADMIN — AZITHROMYCIN 250 MG: 250 TABLET, FILM COATED ORAL at 08:25

## 2020-01-23 RX ADMIN — SODIUM CHLORIDE, PRESERVATIVE FREE 10 ML: 5 INJECTION INTRAVENOUS at 09:00

## 2020-01-23 ASSESSMENT — PAIN SCALES - GENERAL
PAINLEVEL_OUTOF10: 8
PAINLEVEL_OUTOF10: 0
PAINLEVEL_OUTOF10: 9

## 2020-01-23 ASSESSMENT — PAIN DESCRIPTION - PAIN TYPE
TYPE: ACUTE PAIN
TYPE: ACUTE PAIN

## 2020-01-23 ASSESSMENT — PAIN DESCRIPTION - LOCATION
LOCATION: HEAD
LOCATION: HEAD

## 2020-01-23 ASSESSMENT — PAIN DESCRIPTION - DESCRIPTORS
DESCRIPTORS: HEADACHE;ACHING;SORE
DESCRIPTORS: HEADACHE

## 2020-01-23 ASSESSMENT — PAIN - FUNCTIONAL ASSESSMENT: PAIN_FUNCTIONAL_ASSESSMENT: ACTIVITIES ARE NOT PREVENTED

## 2020-01-23 NOTE — ED NOTES
Patient to ED states she hasn't been feeling and just discharged from facility. States she is weak and has a \"horrible\" headache.       Hilda Tate RN  01/22/20 [id number]

## 2020-01-23 NOTE — CARE COORDINATION
SS NOTE:  SW attempted to meet with pt today. She was sleeping soundly with the blanket over her head. SW will continue to attempt to meet with her. Kiersten Velasco. 1/23/2020.12:04 PM.

## 2020-01-23 NOTE — CONSULTS
tablet, Oral, Q4H PRN  Allergies: Toradol [ketorolac tromethamine]    Social History:    TOBACCO:   reports that she has been smoking cigarettes. She has a 3.00 pack-year smoking history. She uses smokeless tobacco.  ETOH:   reports no history of alcohol use. DRUGS:   reports no history of drug use. Family History:       Problem Relation Age of Onset   Saeid Melgoza Asthma Mother     Hypertension Mother     High Blood Pressure Mother     Diabetes Mother     Asthma Brother     High Blood Pressure Father      REVIEW OF SYSTEMS:    CONSTITUTIONAL:  positive for  fatigue and malaise  EYES:  negative  HEENT:  positive for  Headache. RESPIRATORY:  negative  CARDIOVASCULAR:  negative  GASTROINTESTINAL:  negative  GENITOURINARY:  negative  MUSCULOSKELETAL:  positive for  myalgias  NEUROLOGICAL:  negative  BEHAVIOR/PSYCH:  negative  PHYSICAL EXAM:      Vitals:    VITALS:  BP (!) 170/98   Pulse 75   Temp 97.8 °F (36.6 °C) (Oral)   Resp 18   Ht 5' 4\" (1.626 m)   Wt 157 lb 14.4 oz (71.6 kg)   LMP 11/16/2019 (Approximate)   SpO2 97%   BMI 27.10 kg/m²   24HR INTAKE/OUTPUT:    Intake/Output Summary (Last 24 hours) at 1/23/2020 1407  Last data filed at 1/23/2020 0818  Gross per 24 hour   Intake 340 ml   Output --   Net 340 ml       Constitutional:  Well built. poorly nourished. moderate distress. HEENT:  BEERLA. JVD not seen. Respiratory:  Breath sounds normal. No rales or rhonchi. Cardiovascular/Edema:  Heart sounds normal. No murmur. Gastrointestinal:  Bowel sounds normal. No organomegaly. Neurologic:  A/Ox 3. No focal deficit. Skin:  Trace edema.   Other:  Normal turgot    DATA:    CBC:   Lab Results   Component Value Date    WBC 5.8 01/23/2020    RBC 2.95 01/23/2020    HGB 8.6 01/23/2020    HCT 27.7 01/23/2020    MCV 93.9 01/23/2020    MCH 29.2 01/23/2020    MCHC 31.0 01/23/2020    RDW 15.9 01/23/2020     01/23/2020    MPV 9.6 01/23/2020     CMP:    Lab Results   Component Value Date     01/23/2020    K

## 2020-01-23 NOTE — H&P
performed by Ana Watkins MD at [address]  1/31/2012    EF 57%    ECHO COMPL W DOP COLOR FLOW  6/10/2013         IA ESOPHAGOGASTRODUODENOSCOPY TRANSORAL DIAGNOSTIC N/A 5/30/2018    EGD ESOPHAGOGASTRODUODENOSCOPY performed by Aubrie Bertrand MD at [address] [facility] TUNNELED VENOUS PORT PLACEMENT  04/2018    UPPER GASTROINTESTINAL ENDOSCOPY  12/18/2018    EGD BIOPSY performed by Ana Watkins MD at [address] 10/11/2019    EGD ESOPHAGOGASTRODUODENOSCOPY performed by Lisa García DO at [place] [address]       Medications Prior to Admission:    Prior to Admission medications    Medication Sig Start Date End Date Taking?  Authorizing Provider   aspirin 81 MG chewable tablet Take 1 tablet by mouth daily 1/21/20  Yes HEBER Shipman - CNP   bethanechol (URECHOLINE) 25 MG tablet Take 1 tablet by mouth 3 times daily 1/20/20   HEBER Shipman - CNP   vitamin D (ERGOCALCIFEROL) 1.25 MG (10033 UT) CAPS capsule Take 1 capsule by mouth once a week 1/20/20   HEBER Shipman - CNP   atorvastatin (LIPITOR) 40 MG tablet Take 1 tablet by mouth nightly 1/20/20   HEBER Shipman - CNP   azithromycin (ZITHROMAX) 250 MG tablet Take 1 tablet by mouth daily for 2 days 1/21/20 1/23/20  HEBER Shipman - CNP   bumetanide (BUMEX) 1 MG tablet Take 1 tablet by mouth daily 1/20/20   Ganga Santana MD   sodium bicarbonate 650 MG tablet Take 2 tablets by mouth daily 1/20/20   Ganga Santana MD   sodium bicarbonate 650 MG tablet Take 2 tablets by mouth daily 1/20/20 1/19/21  HEBER Shipman CNP   vancomycin (VANCOCIN) 50 mg/mL oral solution Take 2.5 mLs by mouth 4 times daily for 10 days 1/17/20 1/27/20  HEBER Shipman - CNP   cloNIDine (CATAPRES) 0.2 MG tablet Take 1 tablet by mouth 3 times daily 11/9/19   HEBER Shipman - CNP   simethicone (MYLICON) 80 MG chewable tablet Take 1 tablet by mouth every 6 hours as needed for Flatulence 11/9/19   Trino Keller, APRN - CNP   amLODIPine (NORVASC) 5 MG tablet Take 1 tablet by mouth daily 11/10/19   Trino Keller APRN - CNP   calcium acetate (PHOSLO) 667 MG capsule Take 667 mg by mouth 3 times daily (with meals)    Historical Provider, MD   LORazepam (ATIVAN) 0.5 MG tablet Take 0.5 mg by mouth 2 times daily as needed for Anxiety. Historical Provider, MD   amitriptyline (ELAVIL) 10 MG tablet Take 1 tablet by mouth nightly 10/17/19   Rosario Paz MD   metoprolol (LOPRESSOR) 100 MG tablet Take 1 tablet by mouth 2 times daily 10/17/19   Rosario Paz MD   famotidine (PEPCID) 20 MG tablet Take 1 tablet by mouth 2 times daily  Patient taking differently: Take 20 mg by mouth every morning (before breakfast)  10/17/19 11/16/19  Rosario Paz MD   insulin glargine (BASAGLAR KWIKPEN) 100 UNIT/ML injection pen Inject 28 Units into the skin nightly New increased dose  Patient taking differently: Inject 6 Units into the skin nightly Before dinner 12/19/18   Katy Black MD   insulin aspart (NOVOLOG) 100 UNIT/ML injection vial Inject 3 Units into the skin 3 times daily (with meals)     Historical Provider, MD   insulin aspart (NOVOLOG) 100 UNIT/ML injection vial Inject 0-10 Units into the skin 3 times daily (before meals) *Per Sliding Scale*    Historical Provider, MD   glucose (GLUTOSE) 40 % GEL Take 15 g by mouth as needed 4/1/16   Kasey Camarena MD   glucose blood VI test strips (ASCENSIA AUTODISC VI;ONE TOUCH ULTRA TEST VI) strip Indications: 5x a day Freestyle Lite -Tests 5 times daily and as needed for low glucose. E10.10 4/1/16   Kasey Camarena MD   LANCETS THIN by Does not apply route. Indications: cks 5xa a day    Historical Provider, MD   Insulin Syringe-Needle U-100 (INSULIN SYRINGE .5CC/30GX1/2\") 30G X 1/2\" 0.5 ML MISC by Does not apply route. Indications: uses 6-7 a day    Historical Provider, MD       Allergies:     Toradol [ketorolac tromethamine] and Hydralazine    Social History:    reports that she has been smoking cigarettes. She has a 3.00 pack-year smoking history. She uses smokeless tobacco. She reports that she does not drink alcohol or use drugs. Family History:   family history includes Asthma in her brother and mother; Diabetes in her mother; High Blood Pressure in her father and mother; Hypertension in her mother. Reviewed    PHYSICAL EXAM:  Vitals:  BP (!) 187/100   Pulse 120   Temp 98.8 °F (37.1 °C)   Resp 16   Ht 5' 4\" (1.626 m)   Wt 161 lb 8 oz (73.3 kg)   LMP 11/16/2019 (Approximate)   SpO2 97%   BMI 27.72 kg/m²     General Appearance: alert and oriented to person, place and time and in no acute distress  Skin: warm and dry  Head: normocephalic and atraumatic  Eyes: pupils equal, round, and reactive to light, extraocular eye movements intact, conjunctivae normal  Neck: neck supple and non tender without mass   Pulmonary/Chest: clear to auscultation bilaterally- no wheezes, rales or rhonchi, normal air movement, no respiratory distress  Cardiovascular: normal rate, normal S1 and S2. No rubs, gallops, or murmur noted. Abdomen: soft, non-tender, non-distended, normal bowel sounds, no masses or organomegaly  Extremities: no cyanosis, no clubbing. 2+BLE  Neurologic: no cranial nerve deficit and speech normal    LABS:  Recent Labs     01/20/20  0550 01/20/20  1201 01/22/20  1525 01/22/20  1551    139 140  --    K 5.5* 5.6* 4.2  --     108* 104  --    CO2 26 21* 25  --    BUN 47* 43* 40*  --    CREATININE 3.0* 2.8* 2.9*  --    GLUCOSE 91 216* 115* 116   CALCIUM 8.6 7.9* 8.9  --        Recent Labs     01/20/20  0550 01/22/20  1525   WBC 7.1 7.4   RBC 2.84* 3.07*   HGB 8.1* 8.9*   HCT 26.0* 28.0*   MCV 91.5 91.2   MCH 28.5 29.0   MCHC 31.2* 31.8*   RDW 14.7 15.3*    294   MPV 10.2 9.4      Ref.  Range 1/22/2020 15:25   Albumin Latest Ref Range: 3.5 - 5.2 g/dL 2.7 (L)   Alk Phos Latest Ref Range: 35 - 104 U/L 122 (H)   ALT

## 2020-01-23 NOTE — PROGRESS NOTES
hypertension  Acute on chronic kidney disease a stage III  Bilateral lower extremity edema  Nephrotic range proteinuria  History of C. difficile colitis  Anxiety  Uncontrol  diabetes mellitus type 1  Active Problems:     Iron deficiency anemia            Plan:  1. We will titrate medications check renin and aldosterone levels, concern for fibromuscular dysplasia or renal artery stenosis, nephrology consult   2. Start low-dose Bumex, follow renal function  3. Continue oral vancomycin add lactobacillus patient did not complete course of antibiotic treatment  4 Ativan as needed  5 On Lantus and sliding scale insulin check hemoglobin A1c diabetic,  Will consult  educator and teaching    NOTE: This report was transcribed using voice recognition software.  Every effort was made to ensure accuracy; however, inadvertent computerized transcription errors may be present.     Electronically signed by Josy Higuera MD on 1/23/2020 at 10:33 AM

## 2020-01-23 NOTE — ED PROVIDER NOTES
Conjunctiva/sclera: Conjunctivae normal.   Neck:      Musculoskeletal: Normal range of motion and neck supple. Trachea: Trachea and phonation normal.   Cardiovascular:      Rate and Rhythm: Regular rhythm. Tachycardia present. Heart sounds: Normal heart sounds. Pulmonary:      Effort: Pulmonary effort is normal.      Breath sounds: Normal breath sounds and air entry. Abdominal:      Palpations: Abdomen is soft. Tenderness: There is generalized tenderness. There is no guarding or rebound. Skin:     General: Skin is warm and dry. Neurological:      General: No focal deficit present. Mental Status: She is alert and oriented to person, place, and time. GCS: GCS eye subscore is 4. GCS verbal subscore is 5. GCS motor subscore is 6. Psychiatric:         Attention and Perception: Attention and perception normal.         Mood and Affect: Mood and affect normal.         Speech: Speech normal.         Behavior: Behavior normal. Behavior is cooperative. Thought Content: Thought content normal.         Cognition and Memory: Cognition and memory normal.         Judgment: Judgment normal.          Procedures     MDM  Number of Diagnoses or Management Options  Diagnosis management comments: Lab work was largely within normal limits, patient is not in DKA. Unremarkable as well. Despite headache cocktail, patient still states that she has excruciating headache so I did order a CT scan which was unremarkable. Also ordered a CT of the abdomen which was also largely unremarkable with exception of some bladder thickening and large stool burden. I am unable to treat patient's headache and also she is still tachycardic despite 2 L of fluid bolus. Given these, I will admit patient for further management and work-up. I have spoken to Zaid Bland, she will admit the patient for observation. Patient is agreeable to this as well. --------------------------------------------- PAST HISTORY ---------------------------------------------  Past Medical History:  has a past medical history of BC (acute kidney injury) ([facility] [address] Cephalgia, Chronic kidney disease, Depression, Diabetes mellitus ([facility] [address] Diabetic ketoacidosis ([address] Iron deficiency anemia, MDRO (multiple drug resistant organisms) resistance, MRSA (methicillin resistant Staphylococcus aureus), Non compliance w medication regimen, Other disorders of kidney and ureter, Pregnancy, and Severe pre-eclampsia in third trimester. Past Surgical History:  has a past surgical history that includes ECHO Compl W Dop Color Flow (2012); ECHO Compl W Dop Color Flow (6/10/2013);  section; Tunneled venous port placement (2018); pr esophagogastroduodenoscopy transoral diagnostic (N/A, 2018); back surgery; Colonoscopy (N/A, 2018); Colonoscopy (N/A, 2018); Upper gastrointestinal endoscopy (2018); Upper gastrointestinal endoscopy (N/A, 10/11/2019); and Cholecystectomy, laparoscopic (N/A, 2019). Social History:  reports that she has been smoking cigarettes. She has a 3.00 pack-year smoking history. She uses smokeless tobacco. She reports that she does not drink alcohol or use drugs. Family History: family history includes Asthma in her brother and mother; Diabetes in her mother; High Blood Pressure in her father and mother; Hypertension in her mother. The patients home medications have been reviewed. Allergies:  Toradol [ketorolac tromethamine] and Hydralazine    -------------------------------------------------- RESULTS -------------------------------------------------    LABS:  Results for orders placed or performed during the hospital encounter of 20   CBC Auto Differential   Result Value Ref Range    WBC 7.4 4.5 - 11.5 E9/L    RBC 3.07 (L) 3.50 - 5.50 E12/L    Hemoglobin 8.9 (L) 11.5 - 15.5 g/dL    Hematocrit 28.0 (L) 34.0 - 48.0 %    MCV 91.2 80.0 - 99.9 fL    MCH 29.0 26.0 - 35.0 pg    MCHC 31.8 (L) 32.0 - 34.5 %    RDW 15.3 (H) 11.5 - 15.0 fL    Platelets 680 927 - 706 E9/L    MPV 9.4 7.0 - 12.0 fL    Neutrophils % 70.7 43.0 - 80.0 %    Immature Granulocytes % 0.3 0.0 - 5.0 %    Lymphocytes % 18.3 (L) 20.0 - 42.0 %    Monocytes % 6.1 2.0 - 12.0 %    Eosinophils % 3.5 0.0 - 6.0 %    Basophils % 1.1 0.0 - 2.0 %    Neutrophils Absolute 5.20 1.80 - 7.30 E9/L    Immature Granulocytes # 0.02 E9/L    Lymphocytes Absolute 1.35 (L) 1.50 - 4.00 E9/L    Monocytes Absolute 0.45 0.10 - 0.95 E9/L    Eosinophils Absolute 0.26 0.05 - 0.50 E9/L    Basophils Absolute 0.08 0.00 - 0.20 E9/L   Comprehensive Metabolic Panel   Result Value Ref Range    Sodium 140 132 - 146 mmol/L    Potassium 4.2 3.5 - 5.0 mmol/L    Chloride 104 98 - 107 mmol/L    CO2 25 22 - 29 mmol/L    Anion Gap 11 7 - 16 mmol/L    Glucose 115 (H) 74 - 99 mg/dL    BUN 40 (H) 6 - 20 mg/dL    CREATININE 2.9 (H) 0.5 - 1.0 mg/dL    GFR Non-African American 23 >=60 mL/min/1.73    GFR African American 23     Calcium 8.9 8.6 - 10.2 mg/dL    Total Protein 6.3 (L) 6.4 - 8.3 g/dL    Alb 2.7 (L) 3.5 - 5.2 g/dL    Total Bilirubin <0.2 0.0 - 1.2 mg/dL    Alkaline Phosphatase 122 (H) 35 - 104 U/L    ALT 47 (H) 0 - 32 U/L    AST 39 (H) 0 - 31 U/L   Lipase   Result Value Ref Range    Lipase 6 (L) 13 - 60 U/L   pH, venous   Result Value Ref Range    pH, Khurram 7.32 (L) 7.35 - 7.45   Beta-Hydroxybutyrate   Result Value Ref Range    Beta-Hydroxybutyrate 0.39 (H) 0.02 - 0.27 mmol/L   Urinalysis   Result Value Ref Range    Color, UA Yellow Straw/Yellow    Clarity, UA Clear Clear    Glucose, Ur 500 (A) Negative mg/dL    Bilirubin Urine Negative Negative    Ketones, Urine Negative Negative mg/dL    Specific Gravity, UA 1.020 1.005 - 1.030    Blood, Urine LARGE (A) Negative    pH, UA 6.0 5.0 - 9.0    Protein, UA >=300 (A) Negative mg/dL    Urobilinogen, Urine 0.2 <2.0 E.U./dL    Nitrite, Urine Negative Negative    Leukocyte Esterase, Urine TRACE (A) Negative   Lactic Acid, Plasma   Result Value Ref Range    Lactic Acid 0.8 0.5 - 2.2 mmol/L   Microscopic Urinalysis   Result Value Ref Range    WBC, UA 5-10 0 - 5 /HPF    RBC, UA 10-20 (A) 0 - 2 /HPF    Bacteria, UA NONE /HPF   POCT Glucose   Result Value Ref Range    Glucose 116 mg/dL    QC OK? yes    POC Pregnancy Urine Qual   Result Value Ref Range    HCG, Urine, POC Negative Negative    Lot Number [id number]     Positive QC Pass/Fail Pass     Negative QC Pass/Fail Pass    POCT Glucose   Result Value Ref Range    Meter Glucose 116 (H) 74 - 99 mg/dL   POCT Glucose   Result Value Ref Range    Meter Glucose 152 (H) 74 - 99 mg/dL   POCT Glucose   Result Value Ref Range    Meter Glucose 192 (H) 74 - 99 mg/dL       RADIOLOGY:  CT ABDOMEN PELVIS WO CONTRAST Additional Contrast? None   Final Result   1. Small effusions. 2. Large amount of stool in the colon. 3. Bladder wall thickening may be infectious or inflammatory. CT HEAD WO CONTRAST   Final Result      1. No acute findings. ------------------------- NURSING NOTES AND VITALS REVIEWED ---------------------------  Date / Time Roomed:  1/22/2020  3:00 PM  ED Bed Assignment:  [id number]    The nursing notes within the ED encounter and vital signs as below have been reviewed.      Patient Vitals for the past 24 hrs:   BP Temp Temp src Pulse Resp SpO2 Height Weight   01/22/20 2135 (!) 187/100 98.8 °F (37.1 °C) Oral 120 16 97 % 5' 4\" (1.626 m) 161 lb 8 oz (73.3 kg)   01/22/20 2030 (!) 170/108 98.4 °F (36.9 °C) Oral 118 14 96 % -- --   01/22/20 2003 (!) 178/98 -- -- -- -- 96 % -- --   01/22/20 1929 -- -- -- 116 -- -- -- --   01/22/20 1849 (!) 180/102 -- -- 112 26 95 % -- --   01/22/20 1747 (!) 216/141 -- -- 113 18 96 % -- --   01/22/20 1714 (!) 179/106 -- -- 121 24 97 % -- --   01/22/20 1455 (!) 199/107 97.9 °F (36.6 °C) -- 115 18 97 % 5' 4\" (1.626 m) 143 lb (64.9 kg)       Oxygen Saturation Interpretation: Normal    ------------------------------------------ PROGRESS NOTES ------------------------------------------  Re-evaluation(s):  Time: 1900  Patients symptoms show no change  Repeat physical examination is not changed    Counseling:  I have spoken with the patient and discussed todays results, in addition to providing specific details for the plan of care and counseling regarding the diagnosis and prognosis. Their questions are answered at this time and they are agreeable with the plan of admission.    --------------------------------- ADDITIONAL PROVIDER NOTES ---------------------------------  Consultations:  Time: 2000. Spoke with Addis Maloney CNP. Discussed case. They will admit the patient. This patient's ED course included: a personal history and physicial examination    This patient has remained hemodynamically stable during their ED course. Diagnosis:  1. Acute intractable headache, unspecified headache type    2. Tachycardia        Disposition:  Patient's disposition: Admit to telemetry  Patient's condition is stable.            Alesha Madison DO  Resident  01/22/20 9274

## 2020-01-23 NOTE — PLAN OF CARE
Problem: Falls - Risk of:  Goal: Will remain free from falls  Description  Will remain free from falls  1/23/2020 0322 by Alison Reid RN  Outcome: Met This Shift     Problem: Falls - Risk of:  Goal: Absence of physical injury  Description  Absence of physical injury  1/23/2020 0322 by Alison Reid RN  Outcome: Met This Shift     Problem: Pain:  Goal: Pain level will decrease  Description  Pain level will decrease  Outcome: Met This Shift     Problem: Pain:  Goal: Control of acute pain  Description  Control of acute pain  Outcome: Met This Shift     Problem: Pain:  Goal: Control of chronic pain  Description  Control of chronic pain  Outcome: Met This Shift

## 2020-01-24 LAB
ANION GAP SERPL CALCULATED.3IONS-SCNC: 10 MMOL/L (ref 7–16)
BUN BLDV-MCNC: 42 MG/DL (ref 6–20)
CALCIUM SERPL-MCNC: 8.1 MG/DL (ref 8.6–10.2)
CHLORIDE BLD-SCNC: 104 MMOL/L (ref 98–107)
CO2: 20 MMOL/L (ref 22–29)
CREAT SERPL-MCNC: 3 MG/DL (ref 0.5–1)
GFR AFRICAN AMERICAN: 23
GFR NON-AFRICAN AMERICAN: 23 ML/MIN/1.73
GLUCOSE BLD-MCNC: 454 MG/DL (ref 74–99)
HAV IGM SER IA-ACNC: NORMAL
HEPATITIS B CORE IGM ANTIBODY: NORMAL
HEPATITIS B SURFACE ANTIGEN INTERPRETATION: NORMAL
HEPATITIS C ANTIBODY INTERPRETATION: NORMAL
METER GLUCOSE: 114 MG/DL (ref 74–99)
METER GLUCOSE: 223 MG/DL (ref 74–99)
METER GLUCOSE: 378 MG/DL (ref 74–99)
METER GLUCOSE: 407 MG/DL (ref 74–99)
METER GLUCOSE: 45 MG/DL (ref 74–99)
METER GLUCOSE: 64 MG/DL (ref 74–99)
METER GLUCOSE: 64 MG/DL (ref 74–99)
METER GLUCOSE: 69 MG/DL (ref 74–99)
POTASSIUM SERPL-SCNC: 4.7 MMOL/L (ref 3.5–5)
SODIUM BLD-SCNC: 134 MMOL/L (ref 132–146)

## 2020-01-24 PROCEDURE — 6370000000 HC RX 637 (ALT 250 FOR IP): Performed by: NURSE PRACTITIONER

## 2020-01-24 PROCEDURE — 82962 GLUCOSE BLOOD TEST: CPT

## 2020-01-24 PROCEDURE — 96372 THER/PROPH/DIAG INJ SC/IM: CPT

## 2020-01-24 PROCEDURE — 6370000000 HC RX 637 (ALT 250 FOR IP): Performed by: INTERNAL MEDICINE

## 2020-01-24 PROCEDURE — G0378 HOSPITAL OBSERVATION PER HR: HCPCS

## 2020-01-24 PROCEDURE — 99225 PR SBSQ OBSERVATION CARE/DAY 25 MINUTES: CPT | Performed by: INTERNAL MEDICINE

## 2020-01-24 PROCEDURE — 2580000003 HC RX 258: Performed by: NURSE PRACTITIONER

## 2020-01-24 PROCEDURE — 6360000002 HC RX W HCPCS: Performed by: NURSE PRACTITIONER

## 2020-01-24 PROCEDURE — 36415 COLL VENOUS BLD VENIPUNCTURE: CPT

## 2020-01-24 PROCEDURE — 36591 DRAW BLOOD OFF VENOUS DEVICE: CPT

## 2020-01-24 PROCEDURE — 94640 AIRWAY INHALATION TREATMENT: CPT

## 2020-01-24 PROCEDURE — 80048 BASIC METABOLIC PNL TOTAL CA: CPT

## 2020-01-24 PROCEDURE — 6360000002 HC RX W HCPCS: Performed by: INTERNAL MEDICINE

## 2020-01-24 RX ORDER — DILTIAZEM HYDROCHLORIDE 240 MG/1
240 CAPSULE, COATED, EXTENDED RELEASE ORAL DAILY
Status: DISCONTINUED | OUTPATIENT
Start: 2020-01-24 | End: 2020-01-25 | Stop reason: HOSPADM

## 2020-01-24 RX ORDER — ISOSORBIDE MONONITRATE 60 MG/1
60 TABLET, EXTENDED RELEASE ORAL DAILY
Status: DISCONTINUED | OUTPATIENT
Start: 2020-01-24 | End: 2020-01-24

## 2020-01-24 RX ORDER — INSULIN GLARGINE 100 [IU]/ML
12 INJECTION, SOLUTION SUBCUTANEOUS NIGHTLY
Status: DISCONTINUED | OUTPATIENT
Start: 2020-01-24 | End: 2020-01-25 | Stop reason: HOSPADM

## 2020-01-24 RX ORDER — VITAMIN B COMPLEX
2000 TABLET ORAL DAILY
Status: DISCONTINUED | OUTPATIENT
Start: 2020-01-24 | End: 2020-01-25 | Stop reason: HOSPADM

## 2020-01-24 RX ADMIN — CALCIUM ACETATE 667 MG: 667 CAPSULE ORAL at 08:58

## 2020-01-24 RX ADMIN — CLONIDINE HYDROCHLORIDE 0.3 MG: 0.1 TABLET ORAL at 14:19

## 2020-01-24 RX ADMIN — FAMOTIDINE 20 MG: 20 TABLET, FILM COATED ORAL at 06:23

## 2020-01-24 RX ADMIN — BUMETANIDE 0.5 MG: 1 TABLET ORAL at 08:58

## 2020-01-24 RX ADMIN — Medication 125 MG: at 16:53

## 2020-01-24 RX ADMIN — ASPIRIN 81 MG 81 MG: 81 TABLET ORAL at 08:59

## 2020-01-24 RX ADMIN — IPRATROPIUM BROMIDE AND ALBUTEROL SULFATE 1 AMPULE: .5; 3 SOLUTION RESPIRATORY (INHALATION) at 09:39

## 2020-01-24 RX ADMIN — ATORVASTATIN CALCIUM 40 MG: 40 TABLET, FILM COATED ORAL at 20:27

## 2020-01-24 RX ADMIN — HYDRALAZINE HYDROCHLORIDE 100 MG: 50 TABLET ORAL at 14:19

## 2020-01-24 RX ADMIN — HYDROCODONE BITARTRATE AND ACETAMINOPHEN 2 TABLET: 5; 325 TABLET ORAL at 00:40

## 2020-01-24 RX ADMIN — SODIUM BICARBONATE 650 MG TABLET 1300 MG: at 08:58

## 2020-01-24 RX ADMIN — INSULIN LISPRO 12 UNITS: 100 INJECTION, SOLUTION INTRAVENOUS; SUBCUTANEOUS at 08:59

## 2020-01-24 RX ADMIN — DARBEPOETIN ALFA 40 MCG: 40 INJECTION, SOLUTION INTRAVENOUS; SUBCUTANEOUS at 16:52

## 2020-01-24 RX ADMIN — HYDRALAZINE HYDROCHLORIDE 100 MG: 50 TABLET ORAL at 20:27

## 2020-01-24 RX ADMIN — HEPARIN SODIUM 5000 UNITS: 5000 INJECTION, SOLUTION INTRAVENOUS; SUBCUTANEOUS at 13:37

## 2020-01-24 RX ADMIN — METOPROLOL TARTRATE 100 MG: 50 TABLET, FILM COATED ORAL at 20:27

## 2020-01-24 RX ADMIN — CALCIUM ACETATE 667 MG: 667 CAPSULE ORAL at 13:05

## 2020-01-24 RX ADMIN — BUMETANIDE 0.5 MG: 1 TABLET ORAL at 20:27

## 2020-01-24 RX ADMIN — LORAZEPAM 0.5 MG: 0.5 TABLET ORAL at 20:26

## 2020-01-24 RX ADMIN — SODIUM CHLORIDE, PRESERVATIVE FREE 10 ML: 5 INJECTION INTRAVENOUS at 09:01

## 2020-01-24 RX ADMIN — INSULIN GLARGINE 12 UNITS: 100 INJECTION, SOLUTION SUBCUTANEOUS at 20:31

## 2020-01-24 RX ADMIN — METOPROLOL TARTRATE 100 MG: 50 TABLET, FILM COATED ORAL at 08:58

## 2020-01-24 RX ADMIN — ISOSORBIDE MONONITRATE 60 MG: 60 TABLET, EXTENDED RELEASE ORAL at 09:55

## 2020-01-24 RX ADMIN — INSULIN LISPRO 2 UNITS: 100 INJECTION, SOLUTION INTRAVENOUS; SUBCUTANEOUS at 20:31

## 2020-01-24 RX ADMIN — Medication 125 MG: at 09:00

## 2020-01-24 RX ADMIN — CALCIUM ACETATE 667 MG: 667 CAPSULE ORAL at 16:53

## 2020-01-24 RX ADMIN — MELATONIN 2000 UNITS: at 09:55

## 2020-01-24 RX ADMIN — HYDRALAZINE HYDROCHLORIDE 100 MG: 50 TABLET ORAL at 02:00

## 2020-01-24 RX ADMIN — LORAZEPAM 0.5 MG: 0.5 TABLET ORAL at 09:55

## 2020-01-24 RX ADMIN — CLONIDINE HYDROCHLORIDE 0.3 MG: 0.1 TABLET ORAL at 08:58

## 2020-01-24 RX ADMIN — INSULIN LISPRO 10 UNITS: 100 INJECTION, SOLUTION INTRAVENOUS; SUBCUTANEOUS at 13:05

## 2020-01-24 RX ADMIN — AMITRIPTYLINE HYDROCHLORIDE 10 MG: 10 TABLET, FILM COATED ORAL at 20:27

## 2020-01-24 RX ADMIN — HYDRALAZINE HYDROCHLORIDE 100 MG: 50 TABLET ORAL at 08:58

## 2020-01-24 RX ADMIN — Medication 1 CAPSULE: at 08:58

## 2020-01-24 RX ADMIN — CLONIDINE HYDROCHLORIDE 0.3 MG: 0.1 TABLET ORAL at 20:26

## 2020-01-24 RX ADMIN — SODIUM CHLORIDE, PRESERVATIVE FREE 10 ML: 5 INJECTION INTRAVENOUS at 20:27

## 2020-01-24 RX ADMIN — Medication 125 MG: at 13:06

## 2020-01-24 RX ADMIN — Medication 125 MG: at 20:28

## 2020-01-24 RX ADMIN — DILTIAZEM HYDROCHLORIDE 240 MG: 240 CAPSULE, COATED, EXTENDED RELEASE ORAL at 09:55

## 2020-01-24 ASSESSMENT — PAIN DESCRIPTION - FREQUENCY
FREQUENCY: INTERMITTENT
FREQUENCY: INTERMITTENT

## 2020-01-24 ASSESSMENT — PAIN DESCRIPTION - DESCRIPTORS
DESCRIPTORS: CRAMPING
DESCRIPTORS: HEADACHE

## 2020-01-24 ASSESSMENT — PAIN DESCRIPTION - LOCATION
LOCATION: HEAD
LOCATION: ABDOMEN

## 2020-01-24 ASSESSMENT — PAIN DESCRIPTION - PROGRESSION: CLINICAL_PROGRESSION: NOT CHANGED

## 2020-01-24 ASSESSMENT — PAIN SCALES - GENERAL
PAINLEVEL_OUTOF10: 7
PAINLEVEL_OUTOF10: 8
PAINLEVEL_OUTOF10: 0

## 2020-01-24 ASSESSMENT — PAIN DESCRIPTION - PAIN TYPE
TYPE: ACUTE PAIN
TYPE: ACUTE PAIN

## 2020-01-24 ASSESSMENT — PAIN DESCRIPTION - ONSET
ONSET: GRADUAL
ONSET: GRADUAL

## 2020-01-24 ASSESSMENT — PAIN - FUNCTIONAL ASSESSMENT: PAIN_FUNCTIONAL_ASSESSMENT: ACTIVITIES ARE NOT PREVENTED

## 2020-01-24 NOTE — PROGRESS NOTES
rhonchi, normal air movement, no respiratory distress  Cardiovascular: normal rate, normal S1 and S2 and no carotid bruits  Abdomen: soft, non-tender, non-distended, normal bowel sounds, no masses or organomegaly  Extremities: Trace pedal edema  Neurologic: no cranial nerve deficit and speech normal      Recent Labs     01/22/20  1525 01/22/20  1551 01/23/20  1030 01/24/20  0630     --  138 134   K 4.2  --  4.4 4.7     --  105 104   CO2 25  --  21* 20*   BUN 40*  --  36* 42*   CREATININE 2.9*  --  2.6* 3.0*   GLUCOSE 115* 116 417* 454*   CALCIUM 8.9  --  8.1* 8.1*       Recent Labs     01/22/20  1525 01/23/20  1030   WBC 7.4 5.8   RBC 3.07* 2.95*   HGB 8.9* 8.6*   HCT 28.0* 27.7*   MCV 91.2 93.9   MCH 29.0 29.2   MCHC 31.8* 31.0*   RDW 15.3* 15.9*    232   MPV 9.4 9.6       CBC:   Lab Results   Component Value Date    WBC 5.8 01/23/2020    RBC 2.95 01/23/2020    HGB 8.6 01/23/2020    HCT 27.7 01/23/2020    MCV 93.9 01/23/2020    MCH 29.2 01/23/2020    MCHC 31.0 01/23/2020    RDW 15.9 01/23/2020     01/23/2020    MPV 9.6 01/23/2020     BMP:    Lab Results   Component Value Date     01/24/2020    K 4.7 01/24/2020    K 4.4 01/23/2020     01/24/2020    CO2 20 01/24/2020    BUN 42 01/24/2020    LABALBU 2.7 01/22/2020    LABALBU 4.1 05/18/2012    CREATININE 3.0 01/24/2020    CALCIUM 8.1 01/24/2020    GFRAA 23 01/24/2020    LABGLOM 23 01/24/2020    GLUCOSE 454 01/24/2020    GLUCOSE 130 05/18/2012        Radiology:   US RETROPERITONEAL COMPLETE   Final Result   Unremarkable renal sonogram.      CT ABDOMEN PELVIS WO CONTRAST Additional Contrast? None   Final Result   1. Small effusions. 2. Large amount of stool in the colon. 3. Bladder wall thickening may be infectious or inflammatory. CT HEAD WO CONTRAST   Final Result      1. No acute findings.           Assessment:  Active Problems:  Accelerated hypertension  Acute kidney injury on top of chronic kidney disease a stage IV  Uncontrolled diabetes mellitus type 1 with associated hyperglycemia   Nephrotic syndrome due to diabetic nephropathy  Noncompliance  Recent history of C. difficile colitis  Metabolic acidosis  Normochromic normocytic anemia due to chronic kidney disease       Plan:  Blood pressure still not well controlled most likely due to progression of renal disease. will titrate medications. 3 patient is in the renal transplant list  Nephrology following  Increase dose of Lantus start NovoLog prior meals  Patient was advised to be compliant with meds and diet  Continue oral vancomycin  On sodium bicarb supplements  Iron levels within normal range consider Aranesp    1. *NOTE: This report was transcribed using voice recognition software.  Every effort was made to ensure accuracy; however, inadvertent computerized transcription errors may be present.     Electronically signed by Minnie Walker MD on 1/24/2020 at 8:56 AM

## 2020-01-24 NOTE — PROGRESS NOTES
01/24/2020    CREATININE 3.0 01/24/2020    GFRAA 23 01/24/2020    LABGLOM 23 01/24/2020    GLUCOSE 454 01/24/2020    GLUCOSE 130 05/18/2012    PROT 6.3 01/22/2020    LABALBU 2.7 01/22/2020    LABALBU 4.1 05/18/2012    CALCIUM 8.1 01/24/2020    BILITOT <0.2 01/22/2020    ALKPHOS 122 01/22/2020    AST 39 01/22/2020    ALT 47 01/22/2020       IMPRESSION/RECOMMENDATIONS:      Chronic kidney disease stage 3. DM and hypertension related. DM type 2. HTN. Uncontrolled. Anemia of CKD.   - Patient is already on kidney pancrease transplant list.  - Monitor BP and renal function  Aranesp 40 mcg SQ once  Anemia panel noted, no evidence of iron deficiency

## 2020-01-24 NOTE — PROGRESS NOTES
Reason for consult: Uncontrolled DM    A1C:   8.8% 1/23/20       Was 9.8% 1/13/20                 Patient states the following concerns/barriers to diabetes self-management:       [x] None       [] Medication cost   [] Food cost/availability          [] Reading  [] Hearing   [] Vision                  [] Work    [] Transportation  [] No insurance    [] Physical limitations    [] Other:              Diabetes survival packet provided to:   [x] Patient in the past        Information reviewed: Definition of diabetes      Target glucose ranges/A1C      Self-monitoring of blood glucose      Prevention/symptoms/treatment of hypo-/hyperglycemia      Medication adherence      The plate method/meal planning guidelines      The benefits of exercise and recommendations      Reducing the risk of chronic complications          Diabetes medications reviewed (use, purpose, action, side effects): On Basaglar 10 units hs and Novolog 3 units ac 3 meals and CS with hs snack. Comment:  Talked with pt. Have seen in the past.  Discussed A1C down from 10 days ago. Type 1 Dx when she was 15. Stated was going to see a kidney specialist in [facility] [employer] for possible kidney/pancreas transplant. Has insulin and meter/testing supplies at home and stated compliance. Takes 1 unit Novolog for every 15 gm carb but stated has 4 servings with each meal and 4 units would make her go low so does 3 and  aware. Discussed effect of infection on BG. Asked to eat her breakfast and she did. Frustrated with living with DM. Discussed. Pump discussed and CGM. Encouragement given. Receptive.     Evaluation/Plan/Recommendations:   Patient's understanding of diabetes:   []Poor   []Fair    [x]Good   [] Excellent     Outpatient diabetes education is recommended:   [x] Yes  [] No   [] As needed  Patient is interested in outpatient diabetes education:   [] Yes    [x] No    [] Unsure    Recommended:     [] Consult to social work; patient has no insurance or financial hardship      [] Script for glucometer and supplies (per preference of patient's insurance)               [] Script for outpatient diabetes education classes from PCP    [x] Carbohydrate-controlled diet    [] Patient prefers/educator recommends insulin pens instead of syringes     (if insulin ordered for home use)    Thank you for this consult.     Alan Chaney RN CDE

## 2020-01-25 VITALS
HEIGHT: 64 IN | SYSTOLIC BLOOD PRESSURE: 142 MMHG | TEMPERATURE: 97.9 F | OXYGEN SATURATION: 98 % | RESPIRATION RATE: 16 BRPM | HEART RATE: 95 BPM | WEIGHT: 164.4 LBS | BODY MASS INDEX: 28.07 KG/M2 | DIASTOLIC BLOOD PRESSURE: 80 MMHG

## 2020-01-25 LAB
ALBUMIN SERPL-MCNC: 2.2 G/DL (ref 3.5–5.2)
ANION GAP SERPL CALCULATED.3IONS-SCNC: 10 MMOL/L (ref 7–16)
BUN BLDV-MCNC: 46 MG/DL (ref 6–20)
CALCIUM SERPL-MCNC: 7.9 MG/DL (ref 8.6–10.2)
CHLORIDE BLD-SCNC: 106 MMOL/L (ref 98–107)
CO2: 23 MMOL/L (ref 22–29)
CREAT SERPL-MCNC: 3.4 MG/DL (ref 0.5–1)
GFR AFRICAN AMERICAN: 20
GFR NON-AFRICAN AMERICAN: 20 ML/MIN/1.73
GLUCOSE BLD-MCNC: 272 MG/DL (ref 74–99)
METER GLUCOSE: 250 MG/DL (ref 74–99)
PHOSPHORUS: 5.2 MG/DL (ref 2.5–4.5)
POTASSIUM SERPL-SCNC: 5.1 MMOL/L (ref 3.5–5)
SODIUM BLD-SCNC: 139 MMOL/L (ref 132–146)

## 2020-01-25 PROCEDURE — 80069 RENAL FUNCTION PANEL: CPT

## 2020-01-25 PROCEDURE — 6370000000 HC RX 637 (ALT 250 FOR IP): Performed by: NURSE PRACTITIONER

## 2020-01-25 PROCEDURE — 6370000000 HC RX 637 (ALT 250 FOR IP): Performed by: INTERNAL MEDICINE

## 2020-01-25 PROCEDURE — 82962 GLUCOSE BLOOD TEST: CPT

## 2020-01-25 PROCEDURE — 99217 PR OBSERVATION CARE DISCHARGE MANAGEMENT: CPT | Performed by: INTERNAL MEDICINE

## 2020-01-25 PROCEDURE — 94640 AIRWAY INHALATION TREATMENT: CPT

## 2020-01-25 PROCEDURE — G0378 HOSPITAL OBSERVATION PER HR: HCPCS

## 2020-01-25 PROCEDURE — 6360000002 HC RX W HCPCS: Performed by: NURSE PRACTITIONER

## 2020-01-25 PROCEDURE — 96372 THER/PROPH/DIAG INJ SC/IM: CPT

## 2020-01-25 PROCEDURE — 36415 COLL VENOUS BLD VENIPUNCTURE: CPT

## 2020-01-25 RX ORDER — ATORVASTATIN CALCIUM 40 MG/1
40 TABLET, FILM COATED ORAL NIGHTLY
Qty: 30 TABLET | Refills: 0 | Status: SHIPPED
Start: 2020-01-25 | End: 2020-07-29 | Stop reason: SDUPTHER

## 2020-01-25 RX ORDER — CLONIDINE HYDROCHLORIDE 0.3 MG/1
0.3 TABLET ORAL 3 TIMES DAILY
Qty: 60 TABLET | Refills: 3 | Status: SHIPPED | OUTPATIENT
Start: 2020-01-25 | End: 2020-06-23 | Stop reason: ALTCHOICE

## 2020-01-25 RX ORDER — HYDRALAZINE HYDROCHLORIDE 100 MG/1
100 TABLET, FILM COATED ORAL EVERY 8 HOURS SCHEDULED
Qty: 90 TABLET | Refills: 3 | Status: SHIPPED | OUTPATIENT
Start: 2020-01-25 | End: 2020-06-23 | Stop reason: ALTCHOICE

## 2020-01-25 RX ORDER — DILTIAZEM HYDROCHLORIDE 240 MG/1
240 CAPSULE, COATED, EXTENDED RELEASE ORAL DAILY
Qty: 30 CAPSULE | Refills: 3 | Status: SHIPPED
Start: 2020-01-25 | End: 2020-07-29 | Stop reason: SDUPTHER

## 2020-01-25 RX ORDER — AMITRIPTYLINE HYDROCHLORIDE 10 MG/1
10 TABLET, FILM COATED ORAL NIGHTLY
Qty: 30 TABLET | Refills: 0 | Status: SHIPPED | OUTPATIENT
Start: 2020-01-25 | End: 2020-03-23

## 2020-01-25 RX ORDER — HYDROCODONE BITARTRATE AND ACETAMINOPHEN 5; 325 MG/1; MG/1
1 TABLET ORAL EVERY 4 HOURS PRN
Status: DISCONTINUED | OUTPATIENT
Start: 2020-01-25 | End: 2020-01-25 | Stop reason: HOSPADM

## 2020-01-25 RX ORDER — METOPROLOL TARTRATE 100 MG/1
100 TABLET ORAL 2 TIMES DAILY
Qty: 60 TABLET | Refills: 0 | Status: SHIPPED
Start: 2020-01-25 | End: 2020-07-29 | Stop reason: SDUPTHER

## 2020-01-25 RX ORDER — HYDROCODONE BITARTRATE AND ACETAMINOPHEN 5; 325 MG/1; MG/1
2 TABLET ORAL EVERY 4 HOURS PRN
Status: DISCONTINUED | OUTPATIENT
Start: 2020-01-25 | End: 2020-01-25 | Stop reason: HOSPADM

## 2020-01-25 RX ORDER — SODIUM BICARBONATE 650 MG/1
1300 TABLET ORAL DAILY
Qty: 60 TABLET | Refills: 0 | Status: ON HOLD | OUTPATIENT
Start: 2020-01-25 | End: 2020-02-15 | Stop reason: HOSPADM

## 2020-01-25 RX ORDER — HYDRALAZINE HYDROCHLORIDE 50 MG/1
100 TABLET, FILM COATED ORAL EVERY 8 HOURS SCHEDULED
Status: DISCONTINUED | OUTPATIENT
Start: 2020-01-25 | End: 2020-01-25 | Stop reason: HOSPADM

## 2020-01-25 RX ORDER — ERGOCALCIFEROL 1.25 MG/1
50000 CAPSULE ORAL WEEKLY
Qty: 5 CAPSULE | Refills: 0 | Status: SHIPPED
Start: 2020-01-25 | End: 2020-07-29 | Stop reason: SDUPTHER

## 2020-01-25 RX ADMIN — BUMETANIDE 0.5 MG: 1 TABLET ORAL at 09:49

## 2020-01-25 RX ADMIN — HYDROCODONE BITARTRATE AND ACETAMINOPHEN 2 TABLET: 5; 325 TABLET ORAL at 06:03

## 2020-01-25 RX ADMIN — FAMOTIDINE 20 MG: 20 TABLET, FILM COATED ORAL at 05:42

## 2020-01-25 RX ADMIN — HYDRALAZINE HYDROCHLORIDE 100 MG: 50 TABLET ORAL at 01:19

## 2020-01-25 RX ADMIN — HEPARIN SODIUM 5000 UNITS: 5000 INJECTION, SOLUTION INTRAVENOUS; SUBCUTANEOUS at 05:42

## 2020-01-25 RX ADMIN — SODIUM BICARBONATE 650 MG TABLET 1300 MG: at 09:48

## 2020-01-25 RX ADMIN — Medication 1 CAPSULE: at 09:49

## 2020-01-25 RX ADMIN — CLONIDINE HYDROCHLORIDE 0.3 MG: 0.1 TABLET ORAL at 09:49

## 2020-01-25 RX ADMIN — METOPROLOL TARTRATE 100 MG: 50 TABLET, FILM COATED ORAL at 09:49

## 2020-01-25 RX ADMIN — IPRATROPIUM BROMIDE AND ALBUTEROL SULFATE 1 AMPULE: .5; 3 SOLUTION RESPIRATORY (INHALATION) at 07:13

## 2020-01-25 RX ADMIN — DILTIAZEM HYDROCHLORIDE 240 MG: 240 CAPSULE, COATED, EXTENDED RELEASE ORAL at 09:49

## 2020-01-25 RX ADMIN — CALCIUM ACETATE 667 MG: 667 CAPSULE ORAL at 09:49

## 2020-01-25 RX ADMIN — HYDROCODONE BITARTRATE AND ACETAMINOPHEN 2 TABLET: 5; 325 TABLET ORAL at 01:20

## 2020-01-25 RX ADMIN — ASPIRIN 81 MG 81 MG: 81 TABLET ORAL at 09:49

## 2020-01-25 RX ADMIN — MELATONIN 2000 UNITS: at 09:49

## 2020-01-25 RX ADMIN — Medication 125 MG: at 09:53

## 2020-01-25 ASSESSMENT — PAIN SCALES - GENERAL
PAINLEVEL_OUTOF10: 8
PAINLEVEL_OUTOF10: 10

## 2020-01-25 NOTE — DISCHARGE SUMMARY
[facility] Physician Discharge Summary       Pepito Zamorano MD  [address] [facility] [facility]  [place] [address] [phone]            Activity level: as tolerated    Diet: DIET CARB CONTROL; Carb Control: 4 carbs/meal (approximate 1800 kcals/day); No Added Salt (3-4 GM)    Labs:none    Condition at discharge: stable    Dispo: Discharge home    Patient ID:  María Dean  [id number]  76 y.o.  1990    Admit date: 1/22/2020    Discharge date and time:  1/25/2020  8:52 AM    Admission Diagnoses: Active Problems:    Essential hypertension    Diabetes mellitus type 1, uncontrolled (HCC)    Iron deficiency anemia    Headache    C. difficile colitis  Resolved Problems:    * No resolved hospital problems. *      Discharge Diagnoses:  Active Problems:  Malignant hypertension  acute kidney injury on top of chronic kidney disease a stage IV  Metabolic acidosis  Hyperphosphatemia  Type 1 diabetes mellitus uncontrolled with associated hyperglycemia  Nephrotic syndrome due to diabetic nephropathy  Recently diagnosed C. difficile colitis  Noncompliance  Iron deficiency anemia            Consults:  IP CONSULT TO NEPHROLOGY  IP CONSULT TO DIABETES EDUCATOR  IP CONSULT TO DIABETES EDUCATOR    Procedures: None    Hospital Course: Patient is a 22-year-old lady that presented with past medical history significant for chronic kidney disease a stage III diabetes mellitus due to the hospital with generalized weakness increased lower extremity edema was found to have a blood pressure of 200/100 patient was evaluated by nephrology who adjusted blood pressure medications, over the past 2 months her renal function has deteriorated and blood pressure is difficult to control, patient is already on kidney pancreas list, was instructed to be compliant with meds and diet ,  to follow-up with transplant team as well as  Nephrology      Discharge Exam:  Vitals:    01/24/20 1419 01/24/20 2015 01/25/20 0119 as needed for low glucose. E10.10  Qty: 200 strip, Refills: 3      LANCETS THIN by Does not apply route. Indications: cks 5xa a day      Insulin Syringe-Needle U-100 (INSULIN SYRINGE .5CC/30GX1/2\") 30G X 1/2\" 0.5 ML MISC by Does not apply route. Indications: uses 6-7 a day       !! - Potential duplicate medications found. Please discuss with provider. STOP taking these medications       azithromycin (ZITHROMAX) 250 MG tablet Comments:   Reason for Stopping:         cloNIDine (CATAPRES) 0.2 MG tablet Comments:   Reason for Stopping:         simethicone (MYLICON) 80 MG chewable tablet Comments:   Reason for Stopping:         amLODIPine (NORVASC) 5 MG tablet Comments:   Reason for Stopping:                 Note spend more  than 35 minutes was spent in preparing discharge papers, discussing discharge with patient, medication review, etc.    NOTE: This report was transcribed using voice recognition software.  Every effort was made to ensure accuracy; however, inadvertent computerized transcription errors may be present.     Signed:  Electronically signed by Shahla Han MD on 1/25/2020 at 8:52 AM

## 2020-01-26 LAB — ALDOSTERONE: <3 NG/DL

## 2020-02-04 ENCOUNTER — APPOINTMENT (OUTPATIENT)
Dept: GENERAL RADIOLOGY | Age: 30
DRG: 194 | End: 2020-02-04
Payer: COMMERCIAL

## 2020-02-04 ENCOUNTER — HOSPITAL ENCOUNTER (INPATIENT)
Age: 30
LOS: 11 days | Discharge: HOME | DRG: 194 | End: 2020-02-15
Attending: EMERGENCY MEDICINE | Admitting: INTERNAL MEDICINE
Payer: COMMERCIAL

## 2020-02-04 ENCOUNTER — APPOINTMENT (OUTPATIENT)
Dept: CT IMAGING | Age: 30
DRG: 194 | End: 2020-02-04
Payer: COMMERCIAL

## 2020-02-04 PROBLEM — R41.82 ALTERED MENTAL STATUS: Status: ACTIVE | Noted: 2020-02-04

## 2020-02-04 LAB
ACETAMINOPHEN LEVEL: <5 MCG/ML (ref 10–30)
ALBUMIN SERPL-MCNC: 2.6 G/DL (ref 3.5–5.2)
ALBUMIN SERPL-MCNC: 2.8 G/DL (ref 3.5–5.2)
ALP BLD-CCNC: 189 U/L (ref 35–104)
ALP BLD-CCNC: 201 U/L (ref 35–104)
ALT SERPL-CCNC: 27 U/L (ref 0–32)
ALT SERPL-CCNC: 32 U/L (ref 0–32)
AMMONIA: 25 UMOL/L (ref 11–51)
AMPHETAMINE SCREEN, URINE: NOT DETECTED
ANION GAP SERPL CALCULATED.3IONS-SCNC: 11 MMOL/L (ref 7–16)
ANION GAP SERPL CALCULATED.3IONS-SCNC: 15 MMOL/L (ref 7–16)
APTT: 29.9 SEC (ref 24.5–35.1)
AST SERPL-CCNC: 13 U/L (ref 0–31)
AST SERPL-CCNC: 15 U/L (ref 0–31)
B.E.: -0.9 MMOL/L (ref -3–3)
BACTERIA: ABNORMAL /HPF
BARBITURATE SCREEN URINE: NOT DETECTED
BASOPHILS ABSOLUTE: 0.07 E9/L (ref 0–0.2)
BASOPHILS ABSOLUTE: 0.09 E9/L (ref 0–0.2)
BASOPHILS RELATIVE PERCENT: 1.1 % (ref 0–2)
BASOPHILS RELATIVE PERCENT: 1.2 % (ref 0–2)
BENZODIAZEPINE SCREEN, URINE: NOT DETECTED
BETA-HYDROXYBUTYRATE: 0.12 MMOL/L (ref 0.02–0.27)
BILIRUB SERPL-MCNC: 0.3 MG/DL (ref 0–1.2)
BILIRUB SERPL-MCNC: <0.2 MG/DL (ref 0–1.2)
BILIRUBIN URINE: NEGATIVE
BLOOD, URINE: ABNORMAL
BUN BLDV-MCNC: 36 MG/DL (ref 6–20)
BUN BLDV-MCNC: 38 MG/DL (ref 6–20)
CALCIUM SERPL-MCNC: 8 MG/DL (ref 8.6–10.2)
CALCIUM SERPL-MCNC: 8.4 MG/DL (ref 8.6–10.2)
CANNABINOID SCREEN URINE: POSITIVE
CHLORIDE BLD-SCNC: 107 MMOL/L (ref 98–107)
CHLORIDE BLD-SCNC: 108 MMOL/L (ref 98–107)
CHP ED QC CHECK: YES
CLARITY: CLEAR
CO2: 21 MMOL/L (ref 22–29)
CO2: 25 MMOL/L (ref 22–29)
COCAINE METABOLITE SCREEN URINE: NOT DETECTED
COHB: 2.2 % (ref 0–1.5)
COLOR: YELLOW
CREAT SERPL-MCNC: 3.3 MG/DL (ref 0.5–1)
CREAT SERPL-MCNC: 3.3 MG/DL (ref 0.5–1)
CRITICAL: ABNORMAL
DATE ANALYZED: ABNORMAL
DATE OF COLLECTION: ABNORMAL
EKG ATRIAL RATE: 107 BPM
EKG P AXIS: 65 DEGREES
EKG P-R INTERVAL: 116 MS
EKG Q-T INTERVAL: 362 MS
EKG QRS DURATION: 80 MS
EKG QTC CALCULATION (BAZETT): 483 MS
EKG R AXIS: 54 DEGREES
EKG T AXIS: 96 DEGREES
EKG VENTRICULAR RATE: 107 BPM
EOSINOPHILS ABSOLUTE: 0.5 E9/L (ref 0.05–0.5)
EOSINOPHILS ABSOLUTE: 0.83 E9/L (ref 0.05–0.5)
EOSINOPHILS RELATIVE PERCENT: 11.5 % (ref 0–6)
EOSINOPHILS RELATIVE PERCENT: 7.8 % (ref 0–6)
EPITHELIAL CELLS, UA: ABNORMAL /HPF
ETHANOL: <10 MG/DL (ref 0–0.08)
FENTANYL SCREEN, URINE: NOT DETECTED
GFR AFRICAN AMERICAN: 20
GFR AFRICAN AMERICAN: 20
GFR NON-AFRICAN AMERICAN: 20 ML/MIN/1.73
GFR NON-AFRICAN AMERICAN: 20 ML/MIN/1.73
GLUCOSE BLD-MCNC: 247 MG/DL (ref 74–99)
GLUCOSE BLD-MCNC: 257 MG/DL
GLUCOSE BLD-MCNC: 326 MG/DL (ref 74–99)
GLUCOSE URINE: >=1000 MG/DL
HCG, URINE, POC: NEGATIVE
HCO3: 24.5 MMOL/L (ref 22–26)
HCT VFR BLD CALC: 29.4 % (ref 34–48)
HCT VFR BLD CALC: 29.6 % (ref 34–48)
HEMOGLOBIN: 9.1 G/DL (ref 11.5–15.5)
HEMOGLOBIN: 9.2 G/DL (ref 11.5–15.5)
HHB: 2.3 % (ref 0–5)
IMMATURE GRANULOCYTES #: 0.02 E9/L
IMMATURE GRANULOCYTES #: 0.02 E9/L
IMMATURE GRANULOCYTES %: 0.3 % (ref 0–5)
IMMATURE GRANULOCYTES %: 0.3 % (ref 0–5)
INFLUENZA A BY PCR: NOT DETECTED
INFLUENZA B BY PCR: NOT DETECTED
INR BLD: 1
KETONES, URINE: NEGATIVE MG/DL
LAB: ABNORMAL
LACTIC ACID: 2 MMOL/L (ref 0.5–2.2)
LEUKOCYTE ESTERASE, URINE: ABNORMAL
LYMPHOCYTES ABSOLUTE: 0.76 E9/L (ref 1.5–4)
LYMPHOCYTES ABSOLUTE: 1.76 E9/L (ref 1.5–4)
LYMPHOCYTES RELATIVE PERCENT: 11.8 % (ref 20–42)
LYMPHOCYTES RELATIVE PERCENT: 24.3 % (ref 20–42)
Lab: ABNORMAL
Lab: ABNORMAL
Lab: NORMAL
MAGNESIUM: 1.9 MG/DL (ref 1.6–2.6)
MAGNESIUM: 2.1 MG/DL (ref 1.6–2.6)
MCH RBC QN AUTO: 29.2 PG (ref 26–35)
MCH RBC QN AUTO: 29.7 PG (ref 26–35)
MCHC RBC AUTO-ENTMCNC: 31 % (ref 32–34.5)
MCHC RBC AUTO-ENTMCNC: 31.1 % (ref 32–34.5)
MCV RBC AUTO: 94 FL (ref 80–99.9)
MCV RBC AUTO: 96.1 FL (ref 80–99.9)
METER GLUCOSE: 165 MG/DL (ref 74–99)
METER GLUCOSE: 332 MG/DL (ref 74–99)
METER GLUCOSE: 416 MG/DL (ref 74–99)
METHADONE SCREEN, URINE: NOT DETECTED
METHB: 0.4 % (ref 0–1.5)
MODE: ABNORMAL
MONOCYTES ABSOLUTE: 0.32 E9/L (ref 0.1–0.95)
MONOCYTES ABSOLUTE: 0.42 E9/L (ref 0.1–0.95)
MONOCYTES RELATIVE PERCENT: 5 % (ref 2–12)
MONOCYTES RELATIVE PERCENT: 5.8 % (ref 2–12)
NEGATIVE QC PASS/FAIL: NORMAL
NEUTROPHILS ABSOLUTE: 4.12 E9/L (ref 1.8–7.3)
NEUTROPHILS ABSOLUTE: 4.75 E9/L (ref 1.8–7.3)
NEUTROPHILS RELATIVE PERCENT: 56.9 % (ref 43–80)
NEUTROPHILS RELATIVE PERCENT: 74 % (ref 43–80)
NITRITE, URINE: NEGATIVE
O2 CONTENT: 13.7 ML/DL
O2 SATURATION: 97.6 % (ref 92–98.5)
O2HB: 95.1 % (ref 94–97)
OPERATOR ID: 797
OPIATE SCREEN URINE: NOT DETECTED
OXYCODONE URINE: POSITIVE
PATIENT TEMP: 37 C
PCO2: 43.7 MMHG (ref 35–45)
PDW BLD-RTO: 17.7 FL (ref 11.5–15)
PDW BLD-RTO: 17.7 FL (ref 11.5–15)
PH BLOOD GAS: 7.37 (ref 7.35–7.45)
PH UA: 6 (ref 5–9)
PHENCYCLIDINE SCREEN URINE: NOT DETECTED
PHOSPHORUS: 3 MG/DL (ref 2.5–4.5)
PLATELET # BLD: 235 E9/L (ref 130–450)
PLATELET # BLD: 237 E9/L (ref 130–450)
PMV BLD AUTO: 10.1 FL (ref 7–12)
PMV BLD AUTO: 9.5 FL (ref 7–12)
PO2: 117.1 MMHG (ref 75–100)
POSITIVE QC PASS/FAIL: NORMAL
POTASSIUM SERPL-SCNC: 3.3 MMOL/L (ref 3.5–5)
POTASSIUM SERPL-SCNC: 3.8 MMOL/L (ref 3.5–5)
PRO-BNP: ABNORMAL PG/ML (ref 0–125)
PROTEIN UA: >=300 MG/DL
PROTHROMBIN TIME: 10.9 SEC (ref 9.3–12.4)
RBC # BLD: 3.06 E12/L (ref 3.5–5.5)
RBC # BLD: 3.15 E12/L (ref 3.5–5.5)
RBC UA: >20 /HPF (ref 0–2)
SALICYLATE, SERUM: <0.3 MG/DL (ref 0–30)
SODIUM BLD-SCNC: 143 MMOL/L (ref 132–146)
SODIUM BLD-SCNC: 144 MMOL/L (ref 132–146)
SOURCE, BLOOD GAS: ABNORMAL
SPECIFIC GRAVITY UA: 1.02 (ref 1–1.03)
THB: 10.1 G/DL (ref 11.5–16.5)
TIME ANALYZED: 156
TOTAL PROTEIN: 5.5 G/DL (ref 6.4–8.3)
TOTAL PROTEIN: 5.8 G/DL (ref 6.4–8.3)
TRICYCLIC ANTIDEPRESSANTS SCREEN SERUM: NEGATIVE NG/ML
TROPONIN: 0.09 NG/ML (ref 0–0.03)
TROPONIN: 0.1 NG/ML (ref 0–0.03)
UROBILINOGEN, URINE: 0.2 E.U./DL
WBC # BLD: 6.4 E9/L (ref 4.5–11.5)
WBC # BLD: 7.2 E9/L (ref 4.5–11.5)
WBC UA: >20 /HPF (ref 0–5)

## 2020-02-04 PROCEDURE — 94760 N-INVAS EAR/PLS OXIMETRY 1: CPT

## 2020-02-04 PROCEDURE — 87088 URINE BACTERIA CULTURE: CPT

## 2020-02-04 PROCEDURE — 80053 COMPREHEN METABOLIC PANEL: CPT

## 2020-02-04 PROCEDURE — 82140 ASSAY OF AMMONIA: CPT

## 2020-02-04 PROCEDURE — 83605 ASSAY OF LACTIC ACID: CPT

## 2020-02-04 PROCEDURE — APPSS60 APP SPLIT SHARED TIME 46-60 MINUTES: Performed by: PHYSICIAN ASSISTANT

## 2020-02-04 PROCEDURE — 36415 COLL VENOUS BLD VENIPUNCTURE: CPT

## 2020-02-04 PROCEDURE — 1200000000 HC SEMI PRIVATE

## 2020-02-04 PROCEDURE — 2500000003 HC RX 250 WO HCPCS: Performed by: EMERGENCY MEDICINE

## 2020-02-04 PROCEDURE — G0480 DRUG TEST DEF 1-7 CLASSES: HCPCS

## 2020-02-04 PROCEDURE — 6360000002 HC RX W HCPCS: Performed by: EMERGENCY MEDICINE

## 2020-02-04 PROCEDURE — 81001 URINALYSIS AUTO W/SCOPE: CPT

## 2020-02-04 PROCEDURE — 6370000000 HC RX 637 (ALT 250 FOR IP): Performed by: INTERNAL MEDICINE

## 2020-02-04 PROCEDURE — 97535 SELF CARE MNGMENT TRAINING: CPT

## 2020-02-04 PROCEDURE — 85730 THROMBOPLASTIN TIME PARTIAL: CPT

## 2020-02-04 PROCEDURE — 85610 PROTHROMBIN TIME: CPT

## 2020-02-04 PROCEDURE — 2580000003 HC RX 258: Performed by: INTERNAL MEDICINE

## 2020-02-04 PROCEDURE — 87147 CULTURE TYPE IMMUNOLOGIC: CPT

## 2020-02-04 PROCEDURE — 93010 ELECTROCARDIOGRAM REPORT: CPT | Performed by: INTERNAL MEDICINE

## 2020-02-04 PROCEDURE — 99223 1ST HOSP IP/OBS HIGH 75: CPT | Performed by: INTERNAL MEDICINE

## 2020-02-04 PROCEDURE — 82010 KETONE BODYS QUAN: CPT

## 2020-02-04 PROCEDURE — 2500000003 HC RX 250 WO HCPCS: Performed by: INTERNAL MEDICINE

## 2020-02-04 PROCEDURE — 96374 THER/PROPH/DIAG INJ IV PUSH: CPT

## 2020-02-04 PROCEDURE — 82962 GLUCOSE BLOOD TEST: CPT

## 2020-02-04 PROCEDURE — 85025 COMPLETE CBC W/AUTO DIFF WBC: CPT

## 2020-02-04 PROCEDURE — 80307 DRUG TEST PRSMV CHEM ANLYZR: CPT

## 2020-02-04 PROCEDURE — 84100 ASSAY OF PHOSPHORUS: CPT

## 2020-02-04 PROCEDURE — 83880 ASSAY OF NATRIURETIC PEPTIDE: CPT

## 2020-02-04 PROCEDURE — 82805 BLOOD GASES W/O2 SATURATION: CPT

## 2020-02-04 PROCEDURE — 84484 ASSAY OF TROPONIN QUANT: CPT

## 2020-02-04 PROCEDURE — 99285 EMERGENCY DEPT VISIT HI MDM: CPT

## 2020-02-04 PROCEDURE — 87502 INFLUENZA DNA AMP PROBE: CPT

## 2020-02-04 PROCEDURE — 6360000002 HC RX W HCPCS: Performed by: INTERNAL MEDICINE

## 2020-02-04 PROCEDURE — 97165 OT EVAL LOW COMPLEX 30 MIN: CPT

## 2020-02-04 PROCEDURE — 83735 ASSAY OF MAGNESIUM: CPT

## 2020-02-04 PROCEDURE — 87077 CULTURE AEROBIC IDENTIFY: CPT

## 2020-02-04 PROCEDURE — 71045 X-RAY EXAM CHEST 1 VIEW: CPT

## 2020-02-04 PROCEDURE — 99253 IP/OBS CNSLTJ NEW/EST LOW 45: CPT | Performed by: INTERNAL MEDICINE

## 2020-02-04 PROCEDURE — 97161 PT EVAL LOW COMPLEX 20 MIN: CPT | Performed by: PHYSICAL THERAPIST

## 2020-02-04 PROCEDURE — 6370000000 HC RX 637 (ALT 250 FOR IP): Performed by: EMERGENCY MEDICINE

## 2020-02-04 PROCEDURE — 93005 ELECTROCARDIOGRAM TRACING: CPT | Performed by: EMERGENCY MEDICINE

## 2020-02-04 PROCEDURE — 70450 CT HEAD/BRAIN W/O DYE: CPT

## 2020-02-04 RX ORDER — SODIUM CHLORIDE 0.9 % (FLUSH) 0.9 %
10 SYRINGE (ML) INJECTION EVERY 12 HOURS SCHEDULED
Status: DISCONTINUED | OUTPATIENT
Start: 2020-02-04 | End: 2020-02-15 | Stop reason: HOSPADM

## 2020-02-04 RX ORDER — TRAMADOL HYDROCHLORIDE 50 MG/1
50 TABLET ORAL EVERY 6 HOURS PRN
Status: DISCONTINUED | OUTPATIENT
Start: 2020-02-04 | End: 2020-02-15 | Stop reason: HOSPADM

## 2020-02-04 RX ORDER — SODIUM CHLORIDE 0.9 % (FLUSH) 0.9 %
10 SYRINGE (ML) INJECTION PRN
Status: DISCONTINUED | OUTPATIENT
Start: 2020-02-04 | End: 2020-02-15 | Stop reason: HOSPADM

## 2020-02-04 RX ORDER — NICOTINE POLACRILEX 4 MG
15 LOZENGE BUCCAL PRN
Status: DISCONTINUED | OUTPATIENT
Start: 2020-02-04 | End: 2020-02-08 | Stop reason: SDUPTHER

## 2020-02-04 RX ORDER — LABETALOL HYDROCHLORIDE 5 MG/ML
10 INJECTION, SOLUTION INTRAVENOUS ONCE
Status: COMPLETED | OUTPATIENT
Start: 2020-02-04 | End: 2020-02-04

## 2020-02-04 RX ORDER — HEPARIN SODIUM 5000 [USP'U]/ML
5000 INJECTION, SOLUTION INTRAVENOUS; SUBCUTANEOUS EVERY 8 HOURS SCHEDULED
Status: DISCONTINUED | OUTPATIENT
Start: 2020-02-04 | End: 2020-02-15 | Stop reason: HOSPADM

## 2020-02-04 RX ORDER — HYDRALAZINE HYDROCHLORIDE 20 MG/ML
10 INJECTION INTRAMUSCULAR; INTRAVENOUS EVERY 4 HOURS PRN
Status: DISCONTINUED | OUTPATIENT
Start: 2020-02-04 | End: 2020-02-15 | Stop reason: HOSPADM

## 2020-02-04 RX ORDER — HYDRALAZINE HYDROCHLORIDE 25 MG/1
100 TABLET, FILM COATED ORAL EVERY 8 HOURS SCHEDULED
Status: DISCONTINUED | OUTPATIENT
Start: 2020-02-04 | End: 2020-02-15 | Stop reason: HOSPADM

## 2020-02-04 RX ORDER — POTASSIUM CHLORIDE 20 MEQ/1
40 TABLET, EXTENDED RELEASE ORAL ONCE
Status: COMPLETED | OUTPATIENT
Start: 2020-02-04 | End: 2020-02-04

## 2020-02-04 RX ORDER — DILTIAZEM HYDROCHLORIDE 240 MG/1
240 CAPSULE, COATED, EXTENDED RELEASE ORAL DAILY
Status: DISCONTINUED | OUTPATIENT
Start: 2020-02-04 | End: 2020-02-15 | Stop reason: HOSPADM

## 2020-02-04 RX ORDER — MORPHINE SULFATE 4 MG/ML
1 INJECTION, SOLUTION INTRAMUSCULAR; INTRAVENOUS ONCE
Status: COMPLETED | OUTPATIENT
Start: 2020-02-04 | End: 2020-02-04

## 2020-02-04 RX ORDER — INSULIN GLARGINE 100 [IU]/ML
28 INJECTION, SOLUTION SUBCUTANEOUS EVERY MORNING
Status: DISCONTINUED | OUTPATIENT
Start: 2020-02-04 | End: 2020-02-05

## 2020-02-04 RX ORDER — MORPHINE SULFATE 4 MG/ML
INJECTION, SOLUTION INTRAMUSCULAR; INTRAVENOUS
Status: DISPENSED
Start: 2020-02-04 | End: 2020-02-04

## 2020-02-04 RX ORDER — ASPIRIN 81 MG/1
81 TABLET, CHEWABLE ORAL DAILY
Status: DISCONTINUED | OUTPATIENT
Start: 2020-02-04 | End: 2020-02-15 | Stop reason: HOSPADM

## 2020-02-04 RX ORDER — BUMETANIDE 0.25 MG/ML
1 INJECTION, SOLUTION INTRAMUSCULAR; INTRAVENOUS 2 TIMES DAILY
Status: DISCONTINUED | OUTPATIENT
Start: 2020-02-04 | End: 2020-02-04

## 2020-02-04 RX ORDER — DEXTROSE MONOHYDRATE 25 G/50ML
12.5 INJECTION, SOLUTION INTRAVENOUS PRN
Status: DISCONTINUED | OUTPATIENT
Start: 2020-02-04 | End: 2020-02-08 | Stop reason: SDUPTHER

## 2020-02-04 RX ORDER — ATORVASTATIN CALCIUM 40 MG/1
40 TABLET, FILM COATED ORAL NIGHTLY
Status: DISCONTINUED | OUTPATIENT
Start: 2020-02-04 | End: 2020-02-15 | Stop reason: HOSPADM

## 2020-02-04 RX ORDER — DEXTROSE MONOHYDRATE 50 MG/ML
100 INJECTION, SOLUTION INTRAVENOUS PRN
Status: DISCONTINUED | OUTPATIENT
Start: 2020-02-04 | End: 2020-02-08 | Stop reason: SDUPTHER

## 2020-02-04 RX ORDER — LABETALOL HYDROCHLORIDE 5 MG/ML
10 INJECTION, SOLUTION INTRAVENOUS EVERY 4 HOURS PRN
Status: DISCONTINUED | OUTPATIENT
Start: 2020-02-04 | End: 2020-02-15 | Stop reason: HOSPADM

## 2020-02-04 RX ORDER — ONDANSETRON 2 MG/ML
4 INJECTION INTRAMUSCULAR; INTRAVENOUS EVERY 6 HOURS PRN
Status: DISCONTINUED | OUTPATIENT
Start: 2020-02-04 | End: 2020-02-15 | Stop reason: HOSPADM

## 2020-02-04 RX ORDER — FUROSEMIDE 10 MG/ML
40 INJECTION INTRAMUSCULAR; INTRAVENOUS ONCE
Status: COMPLETED | OUTPATIENT
Start: 2020-02-04 | End: 2020-02-04

## 2020-02-04 RX ORDER — AMITRIPTYLINE HYDROCHLORIDE 10 MG/1
10 TABLET, FILM COATED ORAL NIGHTLY
Status: DISCONTINUED | OUTPATIENT
Start: 2020-02-04 | End: 2020-02-15 | Stop reason: HOSPADM

## 2020-02-04 RX ORDER — ACETAMINOPHEN 325 MG/1
650 TABLET ORAL EVERY 4 HOURS PRN
Status: DISCONTINUED | OUTPATIENT
Start: 2020-02-04 | End: 2020-02-15 | Stop reason: HOSPADM

## 2020-02-04 RX ADMIN — METOPROLOL TARTRATE 100 MG: 25 TABLET ORAL at 09:42

## 2020-02-04 RX ADMIN — AMITRIPTYLINE HYDROCHLORIDE 10 MG: 10 TABLET, FILM COATED ORAL at 22:10

## 2020-02-04 RX ADMIN — INSULIN LISPRO 1 UNITS: 100 INJECTION, SOLUTION INTRAVENOUS; SUBCUTANEOUS at 22:14

## 2020-02-04 RX ADMIN — METOPROLOL TARTRATE 100 MG: 25 TABLET ORAL at 22:09

## 2020-02-04 RX ADMIN — ASPIRIN 81 MG 81 MG: 81 TABLET ORAL at 09:42

## 2020-02-04 RX ADMIN — NITROGLYCERIN 1 INCH: 20 OINTMENT TOPICAL at 04:26

## 2020-02-04 RX ADMIN — INSULIN LISPRO 12 UNITS: 100 INJECTION, SOLUTION INTRAVENOUS; SUBCUTANEOUS at 17:48

## 2020-02-04 RX ADMIN — DILTIAZEM HYDROCHLORIDE 240 MG: 240 CAPSULE, COATED, EXTENDED RELEASE ORAL at 09:47

## 2020-02-04 RX ADMIN — MORPHINE SULFATE 1 MG: 4 INJECTION, SOLUTION INTRAMUSCULAR; INTRAVENOUS at 06:54

## 2020-02-04 RX ADMIN — BUMETANIDE 1 MG: 0.25 INJECTION INTRAMUSCULAR; INTRAVENOUS at 09:41

## 2020-02-04 RX ADMIN — ACETAMINOPHEN 650 MG: 325 TABLET, FILM COATED ORAL at 09:42

## 2020-02-04 RX ADMIN — HYDRALAZINE HYDROCHLORIDE 10 MG: 20 INJECTION INTRAMUSCULAR; INTRAVENOUS at 07:59

## 2020-02-04 RX ADMIN — LABETALOL HYDROCHLORIDE 10 MG: 5 INJECTION, SOLUTION INTRAVENOUS at 06:33

## 2020-02-04 RX ADMIN — CLONIDINE HYDROCHLORIDE 0.3 MG: 0.2 TABLET ORAL at 09:47

## 2020-02-04 RX ADMIN — POTASSIUM CHLORIDE 40 MEQ: 1500 TABLET, EXTENDED RELEASE ORAL at 06:36

## 2020-02-04 RX ADMIN — HYDRALAZINE HYDROCHLORIDE 100 MG: 25 TABLET, FILM COATED ORAL at 22:10

## 2020-02-04 RX ADMIN — INSULIN GLARGINE 28 UNITS: 100 INJECTION, SOLUTION SUBCUTANEOUS at 09:49

## 2020-02-04 RX ADMIN — HEPARIN SODIUM 5000 UNITS: 5000 INJECTION, SOLUTION INTRAVENOUS; SUBCUTANEOUS at 06:37

## 2020-02-04 RX ADMIN — LABETALOL HYDROCHLORIDE 10 MG: 5 INJECTION, SOLUTION INTRAVENOUS at 04:44

## 2020-02-04 RX ADMIN — TRAMADOL HYDROCHLORIDE 50 MG: 50 TABLET, FILM COATED ORAL at 19:54

## 2020-02-04 RX ADMIN — ATORVASTATIN CALCIUM 40 MG: 40 TABLET, FILM COATED ORAL at 22:10

## 2020-02-04 RX ADMIN — HYDRALAZINE HYDROCHLORIDE 100 MG: 25 TABLET, FILM COATED ORAL at 17:44

## 2020-02-04 RX ADMIN — BUMETANIDE 0.5 MG/HR: 0.25 INJECTION INTRAMUSCULAR; INTRAVENOUS at 17:27

## 2020-02-04 RX ADMIN — HYDRALAZINE HYDROCHLORIDE 100 MG: 25 TABLET, FILM COATED ORAL at 10:09

## 2020-02-04 RX ADMIN — ONDANSETRON 4 MG: 2 INJECTION INTRAMUSCULAR; INTRAVENOUS at 09:41

## 2020-02-04 RX ADMIN — CLONIDINE HYDROCHLORIDE 0.3 MG: 0.2 TABLET ORAL at 22:10

## 2020-02-04 RX ADMIN — FUROSEMIDE 40 MG: 10 INJECTION, SOLUTION INTRAMUSCULAR; INTRAVENOUS at 04:26

## 2020-02-04 RX ADMIN — CLONIDINE HYDROCHLORIDE 0.3 MG: 0.2 TABLET ORAL at 15:00

## 2020-02-04 ASSESSMENT — PAIN SCALES - GENERAL
PAINLEVEL_OUTOF10: 10
PAINLEVEL_OUTOF10: 8
PAINLEVEL_OUTOF10: 10
PAINLEVEL_OUTOF10: 0
PAINLEVEL_OUTOF10: 9

## 2020-02-04 ASSESSMENT — PAIN DESCRIPTION - PAIN TYPE
TYPE: ACUTE PAIN
TYPE: ACUTE PAIN

## 2020-02-04 ASSESSMENT — PAIN DESCRIPTION - LOCATION: LOCATION: BACK;ABDOMEN;HEAD

## 2020-02-04 NOTE — ED PROVIDER NOTES
kidney disease, Depression, Diabetes mellitus ([facility] [address] Diabetic ketoacidosis ([facility] [address] Iron deficiency anemia, MDRO (multiple drug resistant organisms) resistance, MRSA (methicillin resistant Staphylococcus aureus), Non compliance w medication regimen, Other disorders of kidney and ureter, Pregnancy, and Severe pre-eclampsia in third trimester. Past Surgical History:  has a past surgical history that includes ECHO Compl W Dop Color Flow (2012); ECHO Compl W Dop Color Flow (6/10/2013);  section; Tunneled venous port placement (2018); pr esophagogastroduodenoscopy transoral diagnostic (N/A, 2018); back surgery; Colonoscopy (N/A, 2018); Colonoscopy (N/A, 2018); Upper gastrointestinal endoscopy (2018); Upper gastrointestinal endoscopy (N/A, 10/11/2019); and Cholecystectomy, laparoscopic (N/A, 2019). Social History:  reports that she has been smoking cigarettes. She has a 3.00 pack-year smoking history. She uses smokeless tobacco. She reports that she does not drink alcohol or use drugs. Family History: family history includes Asthma in her brother and mother; Diabetes in her mother; High Blood Pressure in her father and mother; Hypertension in her mother. The patients home medications have been reviewed. Allergies:  Toradol [ketorolac tromethamine]    -------------------------------------------------- RESULTS -------------------------------------------------    LABS:  Results for orders placed or performed during the hospital encounter of 20   Rapid influenza A/B antigens   Result Value Ref Range    Influenza A by PCR Not Detected Not Detected    Influenza B by PCR Not Detected Not Detected   CBC auto differential   Result Value Ref Range    WBC 7.2 4.5 - 11.5 E9/L    RBC 3.15 (L) 3.50 - 5.50 E12/L    Hemoglobin 9.2 (L) 11.5 - 15.5 g/dL    Hematocrit 29.6 (L) 34.0 - 48.0 %    MCV 94.0 80.0 - 99.9 fL    MCH 29.2 26.0 - 35.0 pg    MCHC 31.1 (L) 32.0 - 34.5 % RDW 17.7 (H) 11.5 - 15.0 fL    Platelets 187 219 - 891 E9/L    MPV 9.5 7.0 - 12.0 fL    Neutrophils % 56.9 43.0 - 80.0 %    Immature Granulocytes % 0.3 0.0 - 5.0 %    Lymphocytes % 24.3 20.0 - 42.0 %    Monocytes % 5.8 2.0 - 12.0 %    Eosinophils % 11.5 (H) 0.0 - 6.0 %    Basophils % 1.2 0.0 - 2.0 %    Neutrophils Absolute 4.12 1.80 - 7.30 E9/L    Immature Granulocytes # 0.02 E9/L    Lymphocytes Absolute 1.76 1.50 - 4.00 E9/L    Monocytes Absolute 0.42 0.10 - 0.95 E9/L    Eosinophils Absolute 0.83 (H) 0.05 - 0.50 E9/L    Basophils Absolute 0.09 0.00 - 0.20 E9/L   Comprehensive Metabolic Panel   Result Value Ref Range    Sodium 143 132 - 146 mmol/L    Potassium 3.3 (L) 3.5 - 5.0 mmol/L    Chloride 107 98 - 107 mmol/L    CO2 25 22 - 29 mmol/L    Anion Gap 11 7 - 16 mmol/L    Glucose 247 (H) 74 - 99 mg/dL    BUN 38 (H) 6 - 20 mg/dL    CREATININE 3.3 (H) 0.5 - 1.0 mg/dL    GFR Non-African American 20 >=60 mL/min/1.73    GFR African American 20     Calcium 8.4 (L) 8.6 - 10.2 mg/dL    Total Protein 5.8 (L) 6.4 - 8.3 g/dL    Alb 2.8 (L) 3.5 - 5.2 g/dL    Total Bilirubin <0.2 0.0 - 1.2 mg/dL    Alkaline Phosphatase 201 (H) 35 - 104 U/L    ALT 32 0 - 32 U/L    AST 15 0 - 31 U/L   Ammonia   Result Value Ref Range    Ammonia 25.0 11.0 - 51.0 umol/L   Protime-INR   Result Value Ref Range    Protime 10.9 9.3 - 12.4 sec    INR 1.0    APTT   Result Value Ref Range    aPTT 29.9 24.5 - 35.1 sec   Troponin   Result Value Ref Range    Troponin 0.09 (H) 0.00 - 0.03 ng/mL   Serum Drug Screen   Result Value Ref Range    Ethanol Lvl <10 mg/dL    Acetaminophen Level <5.0 (L) 10.0 - 99.2 mcg/mL    Salicylate, Serum <6.9 0.0 - 30.0 mg/dL   Beta-Hydroxybutyrate   Result Value Ref Range    Beta-Hydroxybutyrate 0.12 0.02 - 0.27 mmol/L   Magnesium   Result Value Ref Range    Magnesium 2.1 1.6 - 2.6 mg/dL   Phosphorus   Result Value Ref Range    Phosphorus 3.0 2.5 - 4.5 mg/dL   Urinalysis   Result Value Ref Range    Color, UA Yellow Straw/Yellow Clarity, UA Clear Clear    Glucose, Ur >=1000 (A) Negative mg/dL    Bilirubin Urine Negative Negative    Ketones, Urine Negative Negative mg/dL    Specific Gravity, UA 1.020 1.005 - 1.030    Blood, Urine LARGE (A) Negative    pH, UA 6.0 5.0 - 9.0    Protein, UA >=300 (A) Negative mg/dL    Urobilinogen, Urine 0.2 <2.0 E.U./dL    Nitrite, Urine Negative Negative    Leukocyte Esterase, Urine TRACE (A) Negative   Urine Drug Screen   Result Value Ref Range    Amphetamine Screen, Urine NOT DETECTED Negative <1000 ng/mL    Barbiturate Screen, Ur NOT DETECTED Negative < 200 ng/mL    Benzodiazepine Screen, Urine NOT DETECTED Negative < 200 ng/mL    Cannabinoid Scrn, Ur POSITIVE (A) Negative < 50ng/mL    Cocaine Metabolite Screen, Urine NOT DETECTED Negative < 300 ng/mL    Opiate Scrn, Ur NOT DETECTED Negative < 300ng/mL    PCP Screen, Urine NOT DETECTED Negative < 25 ng/mL    Methadone Screen, Urine NOT DETECTED Negative <300 ng/mL    Oxycodone Urine POSITIVE (A) Negative <100 ng/mL    FENTANYL SCREEN, URINE NOT DETECTED Negative <1 ng/mL    Drug Screen Comment: see below    Brain Natriuretic Peptide   Result Value Ref Range    Pro-BNP >70,000 (H) 0 - 125 pg/mL   Lactic Acid, Plasma   Result Value Ref Range    Lactic Acid 2.0 0.5 - 2.2 mmol/L   Blood Gas, Arterial   Result Value Ref Range    Date Analyzed 20200204     Time Analyzed 0156     Source: Blood Arterial     pH, Blood Gas 7.366 7.350 - 7.450    PCO2 43.7 35.0 - 45.0 mmHg    PO2 117.1 (H) 75.0 - 100.0 mmHg    HCO3 24.5 22.0 - 26.0 mmol/L    B.E. -0.9 -3.0 - 3.0 mmol/L    O2 Sat 97.6 92.0 - 98.5 %    O2Hb 95.1 94.0 - 97.0 %    COHb 2.2 (H) 0.0 - 1.5 %    MetHb 0.4 0.0 - 1.5 %    O2 Content 13.7 mL/dL    HHb 2.3 0.0 - 5.0 %    tHb (est) 10.1 (L) 11.5 - 16.5 g/dL    Mode NC-  3L     Date Of Collection      Time Collected      Pt Temp 37.0 C          Lab 42412     Critical(s) Notified .  No Critical Values    Microscopic Urinalysis   Result Value Ref Range    WBC, UA >20 0 - 5 /HPF    RBC, UA >20 0 - 2 /HPF    Epi Cells FEW /HPF    Bacteria, UA FEW (A) /HPF   POCT glucose   Result Value Ref Range    Glucose 257 mg/dL    QC OK? yes    POC Pregnancy Urine Qual   Result Value Ref Range    HCG, Urine, POC Negative Negative    Lot Number [id number]     Positive QC Pass/Fail Pass     Negative QC Pass/Fail Pass        RADIOLOGY:  XR CHEST PORTABLE    (Results Pending)   CT Head WO Contrast    (Results Pending)     CT head without contrast (ONRAD read): No acute intracranial process. Chronic sinusitis. Clinical correlation and follow-up recommended. This X-Ray is independently viewed and interpreted by me:   - Study: Chest X-Ray   - Number of Views: 1  - Findings: Cardiomegaly. Pulmonary edema. ------------------------- NURSING NOTES AND VITALS REVIEWED ---------------------------  Date / Time Roomed:  2/4/2020  1:35 AM  ED Bed Assignment:  03/03    The nursing notes within the ED encounter and vital signs as below have been reviewed. Patient Vitals for the past 24 hrs:   BP Pulse Resp SpO2 Weight   02/04/20 0545 (!) 169/96 113 13 99 % --   02/04/20 0444 (!) 202/125 106 12 99 % --   02/04/20 0252 (!) 194/117 110 12 100 % --   02/04/20 0136 (!) 200/123 108 17 100 % 160 lb (72.6 kg)       Oxygen Saturation Interpretation: Normal    ------------------------------------------ PROGRESS NOTES ------------------------------------------  Re-evaluation(s):  Time: 0620. Patients symptoms are improving  Repeat physical examination is significantly improved    Counseling:  I have spoken with the patient and discussed todays results, in addition to providing specific details for the plan of care and counseling regarding the diagnosis and prognosis.   Their questions are answered at this time and they are agreeable with the plan of admission.    --------------------------------- ADDITIONAL PROVIDER NOTES ---------------------------------  Consultations:  Time: Spoke with Dr. Le Lee. Discussed case. They will admit the patient. This patient's ED course included: a personal history and physicial examination, re-evaluation prior to disposition, multiple bedside re-evaluations, IV medications, cardiac monitoring and continuous pulse oximetry    This patient has remained hemodynamically stable during their ED course. Diagnosis:  1. Altered mental status, unspecified altered mental status type    2. Acute congestive heart failure, unspecified heart failure type ([address]    3. Hypertensive encephalopathy    4. Chronic renal impairment, unspecified CKD stage        Disposition:  Patient's disposition: Admit to [facility]  Patient's condition is stable.              Bernabe Seo, DO  02/04/20 Ayla Jackson, DO  02/04/20 2289

## 2020-02-04 NOTE — ED NOTES
Floor called and report given to RN.  Patient to be transported to room 422-1 via stretcher     Coby Antunez RN  02/04/20 [id number]

## 2020-02-04 NOTE — H&P
Staphylococcus aureus)     back wound abcess    Non compliance w medication regimen 3/30/2016    Other disorders of kidney and ureter     Pregnancy 3/30/2016    16 weeks    Severe pre-eclampsia in third trimester 2016       Surgical History:  Past Surgical History:   Procedure Laterality Date    BACK SURGERY      abscess     SECTION      x2    CHOLECYSTECTOMY, LAPAROSCOPIC N/A 2019    CHOLECYSTECTOMY LAPAROSCOPIC performed by Shelia Casey MD at [address] [facility] N/A 2018    COLONOSCOPY WITH BIOPSY performed by Davi Evans MD at [address] N/A 2018    COLONOSCOPY WITH BIOPSY performed by Stanley Sanchez MD at [address]  2012    EF 57%    ECHO COMPL W DOP COLOR FLOW  6/10/2013         ME ESOPHAGOGASTRODUODENOSCOPY TRANSORAL DIAGNOSTIC N/A 2018    EGD ESOPHAGOGASTRODUODENOSCOPY performed by Davi Evans MD at [address] [facility] TUNNELED VENOUS PORT PLACEMENT  2018    UPPER GASTROINTESTINAL ENDOSCOPY  2018    EGD BIOPSY performed by Stanley Sanchez MD at [address] [facility] 10/11/2019    EGD ESOPHAGOGASTRODUODENOSCOPY performed by Dwaine Bowman DO at [place] [address]       Medications Prior to Admission:    Prior to Admission medications    Medication Sig Start Date End Date Taking?  Authorizing Provider   sodium bicarbonate 650 MG tablet Take 2 tablets by mouth daily 20   Marvin Hoang MD   amitriptyline (ELAVIL) 10 MG tablet Take 1 tablet by mouth nightly 20   Marvin Hoang MD   atorvastatin (LIPITOR) 40 MG tablet Take 1 tablet by mouth nightly 20   Marvin Hoang MD   metoprolol (LOPRESSOR) 100 MG tablet Take 1 tablet by mouth 2 times daily 20   Marvin Hoang MD   vancomycin (VANCOCIN) 50 mg/mL oral solution Take 2.5 mLs by mouth 4 times daily for 10 days 20  Marvin Hoang MD   calcium acetate (PHOSLO) 667 MG capsule Take 1 capsule by mouth 3 times daily (with meals) 1/25/20   Kika Barrientos MD   vitamin D (ERGOCALCIFEROL) 1.25 MG (91406 UT) CAPS capsule Take 1 capsule by mouth once a week 1/25/20   Kika Barrientos MD   diltiazem (CARDIZEM CD) 240 MG extended release capsule Take 1 capsule by mouth daily 1/25/20   Kika Barrientos MD   hydrALAZINE (APRESOLINE) 100 MG tablet Take 1 tablet by mouth every 8 hours 1/25/20   Kika Barrientos MD   insulin glargine [facility]) 100 UNIT/ML injection pen Inject 28 Units into the skin nightly New increased dose 1/25/20   Kika Barrientos MD   cloNIDine (CATAPRES) 0.3 MG tablet Take 1 tablet by mouth 3 times daily 1/25/20   Kika Barrientos MD   aspirin 81 MG chewable tablet Take 1 tablet by mouth daily 1/21/20   HEBER Carr CNP   bethanechol (URECHOLINE) 25 MG tablet Take 1 tablet by mouth 3 times daily 1/20/20   HEBER Carr CNP   bumetanide (BUMEX) 1 MG tablet Take 1 tablet by mouth daily 1/20/20   Antonio Roa MD   LORazepam (ATIVAN) 0.5 MG tablet Take 0.5 mg by mouth 2 times daily as needed for Anxiety. Historical Provider, MD   famotidine (PEPCID) 20 MG tablet Take 1 tablet by mouth 2 times daily  Patient taking differently: Take 20 mg by mouth every morning (before breakfast)  10/17/19 11/16/19  Rik Schmitz MD   insulin aspart (NOVOLOG) 100 UNIT/ML injection vial Inject 3 Units into the skin 3 times daily (with meals)     Historical Provider, MD   insulin aspart (NOVOLOG) 100 UNIT/ML injection vial Inject 0-10 Units into the skin 3 times daily (before meals) *Per Sliding Scale*    Historical Provider, MD   glucose (GLUTOSE) 40 % GEL Take 15 g by mouth as needed 4/1/16   Gilford Floras, MD   glucose blood VI test strips (ASCENSIA AUTODISC VI;ONE TOUCH ULTRA TEST VI) strip Indications: 5x a day Freestyle Lite -Tests 5 times daily and as needed for low glucose.   E10.10 4/1/16   Francis Flower urine-patient denied taking narcotics   According to the ER physician, on presentation her pupils were not constricted   She was not given Narcan and she improved gradually   Her blood pressure was over 641 systolic   Hypertensive encephalopathy is a possible etiology for her altered mental status   Will monitor neurologically   Will control blood pressure    2. Hypertension emergency   Over 572 systolic blood pressure   Acute encephalopathy and symptoms of acute decompensated heart failure   Blood pressure improved with labetalol and Lasix given in the ED   Will continue labetalol and hydralazine PRN   Resume home medication   Start patient on Bumex IV twice daily  3. Acute decompensated heart failure    Increased shortness of breath   Bilateral lower extremity edema   Chest x-ray shows vascular congestion flash pulmonary edema   proBNP over 70,000   Previous echo done last month showed preserved ejection fraction   Most likely her acute decompensated heart failure is due to hypertensive emergency   Will control blood pressure   Bumex 1 mg IV twice daily   Cardiology consult  4. Elevated troponin   Patient complained of jaw pain before becoming unresponsive   Mild elevation in troponin 0.06-likely due to renal failure but slightly higher than baseline   No negative changes in the EKG compared to prior   Will repeat EKG in the morning and cycle troponin   Cardiology on consult  5. Hypokalemia   Will replace and monitor  6. CKD -stage IV NOW   Creatinine 3.3 and BUN 38   Around baseline-GFR 20   Patient is in fluid overload    will consult nephrology   Patient might require dialysis due to her fluid overload  7. Diabetes   Resume home insulin   Insulin sliding scale    Code Status: Full  DVT prophylaxis: Heparin SQ      Electronically signed by Linda Partida MD on 2/4/2020 at 5:34 AM      NOTE: This report was transcribed using voice recognition software.  Every effort was made to ensure accuracy; however, inadvertent computerized transcription errors may be present.

## 2020-02-04 NOTE — PROGRESS NOTES
Physical Therapy                      PHYSICAL THERAPY INITIAL EVALUATION  Room #: 8369/7162-97  Patient Name: Luci Steel  Referring Provider: Diane Almanza MD  Diagnosis/Problem list:  Altered mental status [R41.82]        Patient Active Problem List   Diagnosis    High-risk pregnancy    Previous stillbirth or demise, antepartum    Non compliance w medication regimen    Dyspnea on exertion    Tobacco smoking complicating pregnancy    Drug use complicating pregnancy in third trimester    MTHFR mutation ([address]    Poorly controlled type 1 diabetes mellitus ([address]    Diabetes mellitus type 1, uncontrolled ([address]    Previous  delivery affecting pregnancy, antepartum    Oligohydramnios    Kidney stones    Menses painful    Generalized abdominal pain    Opioid abuse, episodic ([address]    Tobacco use    Diabetic ketoacidosis without coma associated with type 1 diabetes mellitus ([address]    Septicemia ([address]    Hypoglycemia    Essential hypertension    DKA, type 1, not at goal [facility])    Hyponatremia    Type 1 diabetes mellitus with hyperosmolarity without coma ([address]    First trimester pregnancy    Acute electrocardiogram changes    Acute kidney injury superimposed on CKD ([address]    Diabetic gastroparesis associated with type 1 diabetes mellitus ([address]    Diarrhea in adult patient    Generalized abdominal pain    Acute kidney injury superimposed on chronic kidney disease ([address]    Acute gastroenteritis    High anion gap metabolic acidosis    Iron deficiency anemia    Headache    Acute respiratory failure with hypoxia (HCC)    Aspiration pneumonia of both lungs (HCC)    Acute cholecystitis    Chest pain, unspecified    Dehydration    Sinus tachycardia    Orthostatic hypotension    Bladder dysfunction    C. difficile colitis    Altered mental status       Tentative placement recommendation: Home Health Physical Therapy     Equipment recommendation:  To be determined      Prior Level of Function: Patient ambulated independently however slow and painful  Rehab Potential: good  for baseline    Plan of care: Patient will be seen    daily  for therapeutic exercise, functional retraining, endurance activities, balance exercises, family and patient education.        AM-PAC Basic Mobility        AM-PAC Mobility Inpatient   How much difficulty turning over in bed?: None  How much difficulty sitting down on / standing up from a chair with arms?: None  How much difficulty moving from lying on back to sitting on side of bed?: None  How much help from another person moving to and from a bed to a chair?: None  How much help from another person needed to walk in hospital room?: A Little  How much help from another person for climbing 3-5 steps with a railing?: A Little  AM-PAC Inpatient Mobility Raw Score : 22  AM-PAC Inpatient T-Scale Score : 53.28  Mobility Inpatient CMS 0-100% Score: 20.91  Mobility Inpatient CMS G-Code Modifier : CJ    Order:  EVALUATE AND TREAT      Past medical history:       Diagnosis Date    BC (acute kidney injury) ([facility] 10/1/2019    Cephalgia 10/9/2019    Chronic kidney disease     Depression     [facility] mellitus [facility]     Diabetic k[facility]acidosis ([facility] 2011    Iron deficiency anemia 10/1/2019    MDRO (multiple drug resistant organisms) resistance     MRSA (methicillin resistant Staphylococcus aureus)     back wound abcess    Non compliance w medication regimen 3/30/2016    Other disorders of kidney and ureter     Pregnancy 3/30/2016    16 weeks    Severe pre-eclampsia in third trimester 2016   ;      Procedure Laterality Date    BACK SURGERY      abscess     SECTION      x2    CHOLECYSTECTOMY, LAPAROSCOPIC N/A 2019    CHOLECYSTECTOMY LAPAROSCOPIC performed by Lazarus Malik MD at [address] [facility] N/A 2018    COLONOSCOPY WITH BIOPSY performed by Milton Camarillo MD at [address] [facility] N/A 2018 mobility, balance, functional transfers, and functional mobility. Sat edge of bed to increase dynamic sitting balance and activity tolerance. Patient demonstrating fair   understanding of education/techniques, requiring additional training/education. At end of session, patient in chair with   call light and phone within reach,   all lines and tubes intact, nursing notified. Pt would benefit from continued skilled PT to improve functional independence and quality of life. Patients Goal: home    Patient and/or family understand and agree to plan of care    ASSESSMENT  Patient exhibits decreased strength, balance, coordination impairing functional mobility. GOALS to be met in 3 days. Bed mobility-  Independent        Transfers-Sit to stand-Independent     Gait:  Patient to ambulate 100 feet using no device with Independent     Steps: Patient to go up and down  10  step(s) using 1 rail(s) with  Independent         Increase strength in affected mm groups by 1/3 grade  Increase balance to allow for improvement towards functional goals. Increase endurance to allow for improvement towards functional goals. Evaluation time includes  review of current medical information, gathering information on past medical history/social history and prior level of function, completion of standardized testing/informal observation of tasks, assessment of data, and development of POC/Goals.      Evaluation Complexity: Low   Time in: 152  Time out: Dimitri Alexander, PT

## 2020-02-04 NOTE — ED NOTES
Breakfast tray at bedside.  Patient awake and attempting to drink and eat     Matthew Echeverria RN  02/04/20 [id number]

## 2020-02-04 NOTE — CONSULTS
Inpatient Cardiology Consultation      Reason for Consult:  Acute diastolic heart failure    Consulting Physician: Dr. Angel Kaufman    Requesting Physician:  Dr. Ronan Up    Date of Consultation: 2020    HISTORY OF PRESENT ILLNESS:   Patient is a 34year old AAF known to [facility]. She was just seen and evaluated by the cardiology service during her hospitalization in 2020 for DKA and shortness of breath. Thorough work-up was unrevealing at that time for etiology of dyspnea. She also had no evidence of acute diastolic heart failure at that time. Of note, patient is a poor historian during interview and exam and appears agitated. Most of history obtained from chart review and nursing staff. Past medical history as noted below, but significant for diabetes mellitus, chronic kidney disease, illicit drug use and tobacco abuse, anxiety and depression. She presented to [address] [place] [address] due to being found unresponsive by family at home. When she was brought to the ED, she was only responsive to painful stimuli. She gradually improved without Narcan administration, and was found to have severely uncontrolled hypertension, BC on CKD and acute diastolic heart failure. She currently denies any chest pain, jaw pain, nausea, vomiting, palpitations, dizziness, syncope. Admits to dyspnea on exertion and peripheral edema. Social and family history as noted below. Labs and diagnostic testing as noted below. We were consulted for evaluation of acute HFpEF. Please note: past medical records were reviewed per electronic medical record (EMR) - see detailed reports under Past Medical/ Surgical History. PAST MEDICAL/SURGICAL HISTORY:    1. Diabetes mellitus. 2. History of DKA. 3. Chronic kidney disease. 4. Depression. Anxiety. 5. Anemia of chronic disease. 6. History of . 7. Laparoscopic cholecystectomy. 8. Hypertension. 9. 2D echocardiogram by Dr. Angel Kaufman 10/2019: EF 55%, mild LVH. Mild pulmonary hypertension. Trace TR. No significant valvular abnormalities. 10. Tobacco abuse. 11. Illicit drug use. 12.  2D echocardiogram in January 2020 by Dr. Mandi Albert: EF 60%. Trace MR. No significant valvular abnormalities. No pericardial effusion. Normal aortic root and ascending aorta. 13.  V/Q scan January 2020: Unremarkable ventilation/perfusion study. 14.  CT chest without contrast 1/14/2020: Bibasilar groundglass opacities without parapneumonic effusion. Groundglass opacities are a nonspecific finding and can be the result of a variety of causes involving pneumonia. 15.  CT abdomen/pelvis 1/22/2020: Small pleural effusions. Bladder wall thickening may be infectious or inflammatory. HOME MEDICATIONS:  Prior to Admission medications    Medication Sig Start Date End Date Taking?  Authorizing Provider   sodium bicarbonate 650 MG tablet Take 2 tablets by mouth daily 1/25/20   Evelyn Andrade MD   amitriptyline (ELAVIL) 10 MG tablet Take 1 tablet by mouth nightly 1/25/20   Evelyn Andrade MD   atorvastatin (LIPITOR) 40 MG tablet Take 1 tablet by mouth nightly 1/25/20   Evelyn Andrade MD   metoprolol (LOPRESSOR) 100 MG tablet Take 1 tablet by mouth 2 times daily 1/25/20   Evelyn Andrade MD   vancomycin (VANCOCIN) 50 mg/mL oral solution Take 2.5 mLs by mouth 4 times daily for 10 days 1/25/20 2/4/20  Evelyn Andrade MD   calcium acetate (PHOSLO) 667 MG capsule Take 1 capsule by mouth 3 times daily (with meals) 1/25/20   Evelyn Andrade MD   vitamin D (ERGOCALCIFEROL) 1.25 MG (20308 UT) CAPS capsule Take 1 capsule by mouth once a week 1/25/20   Evelyn Andrade MD   diltiazem (CARDIZEM CD) 240 MG extended release capsule Take 1 capsule by mouth daily 1/25/20   Evelny Andrade MD   hydrALAZINE (APRESOLINE) 100 MG tablet Take 1 tablet by mouth every 8 hours 1/25/20   Evelyn Andrade MD   insulin glargine [facility]) 100 UNIT/ML Antwan Galindo MD    magnesium hydroxide (MILK OF MAGNESIA) 400 MG/5ML suspension 30 mL, 30 mL, Oral, Daily PRN, Alejandra Noland MD    [facility]) injection 4 mg, 4 mg, Intravenous, Q6H PRN, Alejandra Noland MD, 4 mg at 02/04/20 0941    acetaminophen (TYLENOL) tablet 650 mg, 650 mg, Oral, Q4H PRN, Alejandra Noland MD, 650 mg at 02/04/20 0942    heparin (porcine) injection 5,000 Units, 5,000 Units, Subcutaneous, 3 times per day, Alejandra Noland MD, 5,000 Units at 02/04/20 2545    insulin lispro (HUMALOG) injection vial 0-12 Units, 0-12 Units, Subcutaneous, TID WC, Alejandra Noland MD    insulin lispro (HUMALOG) injection vial 0-6 Units, 0-6 Units, Subcutaneous, Nightly, Alejandra Noland MD    glucose (GLUTOSE) 40 % oral gel 15 g, 15 g, Oral, PRN, Alejandra Noland MD    dextrose 50 % IV solution, 12.5 g, Intravenous, PRN, Alejandra Noland MD    glucagon (rDNA) injection 1 mg, 1 mg, Intramuscular, PRN, Alejandra Noland MD    dextrose 5 % solution, 100 mL/hr, Intravenous, PRN, Alejandra Noland MD    hydrALAZINE (APRESOLINE) injection 10 mg, 10 mg, Intravenous, Q4H PRN, Alejandra Noland MD, 10 mg at 02/04/20 0759    labetalol (NORMODYNE;TRANDATE) injection 10 mg, 10 mg, Intravenous, Q4H PRN, Alejandra Noland MD, 10 mg at 02/04/20 3307    bumetanide (BUMEX) injection 1 mg, 1 mg, Intravenous, BID, Alejandra Noland MD, 1 mg at 02/04/20 0941    amitriptyline (ELAVIL) tablet 10 mg, 10 mg, Oral, Nightly, Alejandra Noland MD    aspirin chewable tablet 81 mg, 81 mg, Oral, Daily, Alejandra Noland MD, 81 mg at 02/04/20 8360    atorvastatin (LIPITOR) tablet 40 mg, 40 mg, Oral, Nightly, Alejandra Noland MD    cloNIDine (CATAPRES) tablet 0.3 mg, 0.3 mg, Oral, TID, Alejandra Noland MD, 0.3 mg at 02/04/20 0947    diltiazem (CARDIZEM CD) extended release capsule 240 mg, 240 mg, Oral, Daily, Alejandra Noland MD, 240 mg at 02/04/20 0947    hydrALAZINE (APRESOLINE) tablet 100 mg, 100 mg, Oral, 3 times per day, Alejandra Noland MD, 100 mg at 02/04/20 1009    insulin glargine (LANTUS) injection vial 28 Units, 28 Units, Subcutaneous, QAM, Peter Graham MD, 28 Units at 02/04/20 0949    metoprolol tartrate (LOPRESSOR) tablet 100 mg, 100 mg, Oral, BID, Peter Graham MD, 100 mg at 02/04/20 0942    morphine 4 MG/ML injection, , , ,       ALLERGIES:  Toradol [ketorolac tromethamine]    SOCIAL HISTORY:    Current every day tobacco smoker, smokes 1/2 ppd. Denies current alcohol or illicit drug use, however urine drug screen this admission 2/4/2020 positive for marijuana and opioids.     FAMILY HISTORY:   Denies pertinent cardiac family medical history. REVIEW OF SYSTEMS:     · Constitutional: +fatigue. Denies fevers, chills, night sweats. Denies significant weight loss or weight gain. · HEENT: Denies headaches, nose bleeds, rhinorrhea, sore throat. Denies blurred vision. Denies dysphagia, odynophagia. · Musculoskeletal: Denies falls, pain to BLE with ambulation. Denies muscle weakness. · Neurological: Denies dizziness and lightheadedness, numbness and tingling. Denies focal neurological deficits. · Cardiovascular: +peripheral edema. Denies chest pain, palpitations, diaphoresis. Denies syncope. Denies PND, orthopnea. · Respiratory: +shortness of breath with exertion. Denies cough, hemoptysis. · Gastrointestinal: Denies abdominal pain, nausea/vomiting, diarrhea and constipation, black/bloody, and tarry stools. · Genitourinary: Denies dysuria and hematuria. · Hematologic: Denies excessive bruising or bleeding. · Endocrine: Denies excessive thirst. Denies intolerance to hot and cold. · Psychiatric: +anxiety and +depression. PHYSICAL EXAM:   /68   Pulse 104   Temp 99 °F (37.2 °C) (Oral)   Resp 18   Wt 160 lb (72.6 kg)   LMP 11/16/2019 (LMP Unknown)   SpO2 99%   BMI 27.46 kg/m²   CONST:  Well developed, well nourished AAF who appears stated age. Awake, alert, cooperative, no apparent distress. HEENT:   Head- Normocephalic, atraumatic. Eyes- Conjunctivae pink, anicteric. Throat- Oral mucosa pink and moist.  Neck-  No stridor, trachea midline, no jugular venous distention. CHEST: Chest symmetrical and non-tender to palpation. No accessory muscle use or intercostal retractions. RESPIRATORY: Lung sounds - rales bilaterally. CARDIOVASCULAR:     No carotid bruit. Heart Inspection- shows no noted pulsations. Heart Palpation- no heaves or thrills; PMI is non-displaced. Heart Ausculation- Regular rate and rhythm, no murmur. No s3, s4 or rub. PV: 1-2+ lower extremity edema. No varicosities. Pedal pulses palpable, no clubbing or cyanosis. ABDOMEN: Soft, non-tender to light palpation. Bowel sounds present. No palpable masses no organomegaly; no abdominal bruit. MS: Good muscle strength and tone. No atrophy or abnormal movements. : Deferred  SKIN: Warm and dry. No statis dermatitis or ulcers. NEURO / PSYCH: Oriented to person, place and time. Speech clear and appropriate. Follows all commands. DATA:    Telemetry: Normal sinus rhythm, HR in the 90s. Diagnostic:  CXR 2/4/2020: Findings most likely representing CHF versus atypical infectious process. Head CT 2/4/2020: No acute intracranial abnormality. Findings suggesting sphenoid, bilateral ethmoid and right maxillary sinus mucosal disease.       No intake or output data in the 24 hours ending 02/04/20 1256    Labs:   CBC:   Recent Labs     02/04/20 0145 02/04/20  0735   WBC 7.2 6.4   HGB 9.2* 9.1*   HCT 29.6* 29.4*    235     BMP:   Recent Labs     02/04/20 0145 02/04/20  0735    144   K 3.3* 3.8   CO2 25 21*   BUN 38* 36*   CREATININE 3.3* 3.3*   LABGLOM 20 20   CALCIUM 8.4* 8.0*     Mag:   Recent Labs     02/04/20 0145 02/04/20  0735   MG 2.1 1.9     Phos:   Recent Labs     02/04/20 0145   PHOS 3.0     HgA1c:   Lab Results   Component Value Date    LABA1C 8.8 (H) 01/23/2020     PT/INR:   Recent Labs     02/04/20 0145   PROTIME 10.9   INR 1.0 personal assessment and plan as noted below. All orders and medical decision making was done by me. REVIEW OF SYSTEMS:     · · Constitutional: +fatigue. Denies fevers, chills, night sweats. Denies significant weight loss or weight gain. · · HEENT: Denies headaches, nose bleeds, rhinorrhea, sore throat. Denies blurred vision. Denies dysphagia, odynophagia. · · Musculoskeletal: Denies falls, pain to BLE with ambulation. Denies muscle weakness. · · Neurological: Denies dizziness and lightheadedness, numbness and tingling. Denies focal neurological deficits. · · Cardiovascular: +peripheral edema. Denies chest pain, palpitations, diaphoresis. Denies syncope. Denies PND, orthopnea. · · Respiratory: +shortness of breath with exertion. Denies cough, hemoptysis. · · Gastrointestinal: Denies abdominal pain, nausea/vomiting, diarrhea and constipation, black/bloody, and tarry stools. · · Genitourinary: Denies dysuria and hematuria. · · Hematologic: Denies excessive bruising or bleeding. · · Endocrine: Denies excessive thirst. Denies intolerance to hot and cold. · · Psychiatric: +anxiety and +depression. PHYSICAL EXAM:   BP (!) 154/85   Pulse 91   Temp 99.6 °F (37.6 °C) (Oral)   Resp 18   Ht 5' 4\" (1.626 m)   Wt 171 lb 8 oz (77.8 kg)   LMP 11/16/2019 (LMP Unknown)   SpO2 98%   BMI 29.44 kg/m²   CONST:  Well developed, well nourished AA female who appears stated age. Awake, alert, no apparent distress  HEENT:   Head- Normocephalic, atraumatic   Eyes- Conjunctivae pink, anicteric  Throat- Oral mucosa pink and moist  Neck-  No stridor, trachea midline, no jugular venous distention. CHEST: Chest symmetrical and non-tender to palpation.  No accessory muscle use or intercostal retractions  RESPIRATORY: Lung sounds - rales bilaterally  CARDIOVASCULAR:     No carotid bruit  Heart Inspection- shows no noted pulsations  Heart Palpation- no heaves or thrills; PMI is non-displaced   Heart Ausculation- Regular rate and rhythm, no murmur. No s3, s4 or rub   PV: 1+ lower extremity edema bilaterally. No varicosities. Pedal pulses palpable, no clubbing or cyanosis   ABDOMEN: Soft, non-tender to light palpation. Bowel sounds present. No palpable masses no organomegaly; no abdominal bruit  MS: Good muscle strength and tone. No atrophy or abnormal movements. : Deferred  SKIN: Warm and dry no statis dermatitis or ulcers   NEURO / PSYCH: Oriented to person, place and time. Speech clear and appropriate. Follows all commands. ASSESSMENT:  1. Altered mental status, likely in setting of use of non-prescribed oxycodone and marijuana. Possible acute encephalopathy due to severely elevated blood pressure. Blood pressure better controlled with resolution of altered mental status and negative head CT. 2. Elevated troponin, chronically elevated. Likely type II due to demand ischemia due to substance abuse, BC on CKD and acute diastolic congestive heart failure. 3. Acute diastolic heart failure. 4. Diabetes mellitus type. 5. Acute kidney injury on chronic kidney disease. 6. Depression. Anxiety        PLAN:  1. Altered mental status improved. 2. Blood pressure better controlled on current medical management  3. Worsening renal function with fluid overload/acute diastolic heart failure. Diuresis per nephrology service  4. No aggressive cardiac work up indicated at this time  5. Will continue to follow          Addie Aguilar, [facility] [place], [address]

## 2020-02-04 NOTE — PROGRESS NOTES
Occupational Therapy  OCCUPATIONAL THERAPY INITIAL EVALUATION      Date:2020  Patient Name: Zena Olson  MRN: [id number]  : 1990  Room: [address] [place] [address]    Referring Physician: Karan Carroll MD    Evaluating OT: Machelle Mendez OTR/L #[id number]    AM-PAC Daily Activity Raw Score:   AM-PAC Daily Activity Inpatient   How much help for putting on and taking off regular lower body clothing?: A Little  How much help for Bathing?: A Little  How much help for Toileting?: A Little  How much help for putting on and taking off regular upper body clothing?: None  How much help for taking care of personal grooming?: None  How much help for eating meals?: None  AM-PAC Inpatient Daily Activity Raw Score: 21  AM-PAC Inpatient ADL T-Scale Score : 44.27  ADL Inpatient CMS 0-100% Score: 32.79  ADL Inpatient CMS G-Code Modifier : CJ      Recommended Placement: HHOT  Recommended Adaptive Equipment: shower chair    Diagnosis: Altered mental status      Pertinent Medical History:    Past Medical History:   Diagnosis Date    BC (acute kidney injury) ([facility] 10/1/2019    Cephalgia 10/9/2019    Chronic kidney disease     Depression     Diabetes mellitus ([facility]     Diabetic ketoacidosis ([facility] [address] 2011    Iron deficiency anemia 10/1/2019    MDRO (multiple drug resistant organisms) resistance     MRSA (methicillin resistant Staphylococcus aureus)     back wound abcess    Non compliance w medication regimen 3/30/2016    Other disorders of kidney and ureter     Pregnancy 3/30/2016    16 weeks    Severe pre-eclampsia in third trimester 2016      Precautions:  Falls, O2     Home Living: Pt lives with two children (2 and 1 yr old) in a 1 story home with 8 RICKIE and rails. Bathroom setup: tub/shower   Equipment owned: None    Prior Level of Function: Independent with ADLs , Independent with IADLs; ambulated without AD  Driving: Yes  Occupation: None    Pain Level: Pt denies pain at rest; Shows signs of pain with movement. Therapist facilitated repositioning to address pain. Cognition: A&O: 4/4; Follows 2 step directions   Memory:  Good   Sequencing:  Fair, moves slowly   Problem solving:  Fair+   Judgement/safety:  Fair+     Functional Assessment:   Initial Eval Status  Date: 2/4/20 Treatment Status  Date: STGs = LTGs  Time frame: 5-7 days   Feeding Independent        Grooming Independent         UB Dressing Independent   Don gown around back     LB Dressing Minimal Assist   Due to edema in B LE  Independent    Bathing Minimal Assist    Independent    Toileting Stand by Assist   Simulated in bathroom, slow to sit<>stand from commode. Independent    Bed Mobility  Supine to sit: Independent   Sit to supine: Independent        Functional Transfers Stand by Assist  From low surface (toilet). Independent from chair, bed  Sit<>stand. Slow to stand. Independent    Functional Mobility Supervision   Without AD; 50 feet x2. Slow pace. Independent    Balance Sitting:     Static:  Good    Dynamic:Fair+  Standing: Fair     Activity Tolerance Fair  O2 sats good throughout treatment. Pt has general weakness. Good   Visual/  Perceptual Glasses: Yes  WFL          Hand Dominance Left     Strength ROM Additional Info:    RUE  4-/5  WFL good  and wfl FMC/dexterity noted during ADL tasks       LUE 4-/5  WFL good  and wfl FMC/dexterity noted during ADL tasks       Hearing: WFL   Sensation:  No c/o numbness or tingling   Tone: WFL   Edema: B LE                            Comments:   Nursing approved therapy session. Upon arrival, patient supine in bed and agreeable to OT session with PT collaboration. Therapist educated pt on role of OT. At end of session, patient supine in bed with call light and phone within reach, all lines and tubes intact; nursing aware. Pt demonstrated good understanding of education/techniques and decreased independence and safety during completion of ADL/functional transfer/mobility tasks.   Pt would benefit from continued skilled OT to increase safety and independence with completion of ADL/IADL tasks for functional independence and quality of life. Treatment:  Skilled occupational therapy services provided include instruction/training on safety and adapted techniques for completion of therapeutic activities, ADLs/IADLs. Therapist educated pt on energy conservation techniques. Therapist facilitated bed mobility, functional transfers, graded functional activities (static/dynamic sitting, static/dynamic standing, functional reaching), and functional ambulation without AD - providing min cuing (verbal, visual, tactile) on AD management, hand placement, body mechanics, posture, breathing techniques, energy conservation, compensatory strategies, and safety. Therapist facilitated self-care retraining: UB dressing, LB dressing, toileting tasks - providing min cuing (verbal, visual, tactile) on body mechanics, posture, breathing techniques, energy conservation, compensatory strategies, and safety. Skilled monitoring of O2 sats, HR, and pt response throughout treatment.       Eval Complexity: Low    Assessment of current deficits   Functional mobility [x]  ADLs [x] Strength [x]  Cognition []  Functional transfers  [x] IADLs [x] Safety Awareness [x]  Endurance [x]  Fine Motor Coordination [] Balance [x] Vision/perception [] Sensation []   Gross Motor Coordination [] ROM [] Delirium []                  Motor Control []    Plan of Care: OT 1-3x/week PRN during hospitalization  ADL retraining [x]   Equipment needs [x]   Neuromuscular re-education [x] Energy Conservation Techniques [x]  Functional Transfer training [x] Patient and/or Family Education [x]  Functional Mobility training [x]  Environmental Modifications [x]  Cognitive re-training []   Compensatory techniques for ADLs [x]  Splinting Needs []   Positioning to improve overall function [x]   Therapeutic Activity [x]  Therapeutic Exercise  [x]  Visual/Perceptual: []

## 2020-02-04 NOTE — CONSULTS
Department of Internal Medicine  Nephrology Attending Consult Note      Reason for Consult:  Acute kidney injury on CKD stage 4. Hypertension. Edema. Requesting Physician:  Dr Fercho Chaparro. CHIEF COMPLAINT:  AMS. Backache. Headache. History Obtained From:  patient, electronic medical record    HISTORY OF PRESENT ILLNESS:  Patient was recently discharged. Was found unresponsive. Admitted with accelerated HTN and acute kidney injury on chronic kidney disease stage 4. She is awake and asking for some pain med.     Past Medical History:        Diagnosis Date    BC (acute kidney injury) ([facility] [address] 10/1/2019    Cephalgia 10/9/2019    Chronic kidney disease     Depression     Diabetes mellitus ([facility] [address]     Diabetic ketoacidosis ([facility] [address] 2011    Iron deficiency anemia 10/1/2019    MDRO (multiple drug resistant organisms) resistance     MRSA (methicillin resistant Staphylococcus aureus)     back wound abcess    Non compliance w medication regimen 3/30/2016    Other disorders of kidney and ureter     Pregnancy 3/30/2016    16 weeks    Severe pre-eclampsia in third trimester 2016     Past Surgical History:        Procedure Laterality Date    BACK SURGERY      abscess     SECTION      x2    CHOLECYSTECTOMY, LAPAROSCOPIC N/A 2019    CHOLECYSTECTOMY LAPAROSCOPIC performed by Lyric Hampton MD at [facility] 2018    COLONOSCOPY WITH BIOPSY performed by Johnathan Blakely MD at [address] [facility] COLONOSCOPY N/A 2018    COLONOSCOPY WITH BIOPSY performed by Rosa Carter MD at [address] [place] [address] ECHO COMPL [address]  2012    EF 57%    ECHO COMPL W DOP COLOR FLOW  6/10/2013         AL ESOPHAGOGASTRODUODENOSCOPY TRANSORAL DIAGNOSTIC N/A 2018    EGD ESOPHAGOGASTRODUODENOSCOPY performed by Johnathan Blakely MD at [address] [facility] TUNNELED VENOUS PORT PLACEMENT  2018    UPPER GASTROINTESTINAL ENDOSCOPY  2018    EGD BIOPSY performed by [facility] MD at [facility]. She has a 3.00 pack-year smoking history. She uses smokeless tobacco.  ETOH:   reports no history of alcohol use. DRUGS:   reports no history of drug use. Family History:       Problem Relation Age of Onset   [employer] Asthma Mother     Hypertension Mother     High Blood Pressure Mother     Diabetes Mother     Asthma Brother     High Blood Pressure Father      REVIEW OF SYSTEMS:    CONSTITUTIONAL:  positive for  fatigue, malaise and anorexia  EYES:  negative  HEENT:  negative  RESPIRATORY:  negative  CARDIOVASCULAR:  negative  GASTROINTESTINAL:  negative  GENITOURINARY:  negative  MUSCULOSKELETAL:  positive for  myalgias and arthralgias  NEUROLOGICAL:  negative  BEHAVIOR/PSYCH:  negative  PHYSICAL EXAM:      Vitals:    VITALS:  /68   Pulse 104   Temp 99 °F (37.2 °C) (Oral)   Resp 18   Ht 5' 4\" (1.626 m)   Wt 171 lb 8 oz (77.8 kg)   LMP 2019 (LMP Unknown)   SpO2 99%   BMI 29.44 kg/m²   24HR PULSE OXIMETRY RANGE:  SpO2  Av.3 %  Min: 98 %  Max: 100 %    Constitutional:  Well built. Poorly nourished. HEENT:  BEERLA. JVD not seen. Respiratory:  Bilateral basal rales. Cardiovascular/Edema:  Heart sounds normal. No murmur. Gastrointestinal:  Bowel sounds normal. No organomegaly  Neurologic:  A/ox 3. No focal deficit. Skin:  Dry skin. Normal turgor.   Other:  Edema +++    DATA:    CBC:   Lab Results   Component Value Date    WBC 6.4 2020    RBC 3.06 2020    HGB 9.1 2020    HCT 29.4 2020    MCV 96.1 2020    MCH 29.7 2020    MCHC 31.0 2020    RDW 17.7 2020     2020    MPV 10.1 2020     CMP:    Lab Results   Component Value Date     2020    K 3.8 2020    K 4.4 2020     2020    CO2 21 2020    BUN 36 2020    CREATININE 3.3 2020    GFRAA 20 2020    LABGLOM 20 2020    GLUCOSE 326 2020    GLUCOSE 130 2012    PROT 5.5 2020    LABALBU 2.6 02/04/2020    LABALBU 4.1 05/18/2012    CALCIUM 8.0 02/04/2020    BILITOT 0.3 02/04/2020    ALKPHOS 189 02/04/2020    AST 13 02/04/2020    ALT 27 02/04/2020       IMPRESSION/RECOMMENDATIONS:      Acute kidney injury on CKD stage 4. CKD stage 4 from DM type 1. HTN. Accelerated. Better now. Anemia of CKD. Sever edema. Worsening renal function.  - Monitor renal function.  - Start bumex drip. - Discussed with RN.

## 2020-02-05 LAB
ALBUMIN SERPL-MCNC: 2.3 G/DL (ref 3.5–5.2)
ALP BLD-CCNC: 164 U/L (ref 35–104)
ALT SERPL-CCNC: 24 U/L (ref 0–32)
ANION GAP SERPL CALCULATED.3IONS-SCNC: 8 MMOL/L (ref 7–16)
AST SERPL-CCNC: 21 U/L (ref 0–31)
BASOPHILS ABSOLUTE: 0.05 E9/L (ref 0–0.2)
BASOPHILS RELATIVE PERCENT: 1.5 % (ref 0–2)
BILIRUB SERPL-MCNC: <0.2 MG/DL (ref 0–1.2)
BUN BLDV-MCNC: 37 MG/DL (ref 6–20)
CALCIUM SERPL-MCNC: 8.1 MG/DL (ref 8.6–10.2)
CHLORIDE BLD-SCNC: 106 MMOL/L (ref 98–107)
CO2: 26 MMOL/L (ref 22–29)
CREAT SERPL-MCNC: 3.6 MG/DL (ref 0.5–1)
EOSINOPHILS ABSOLUTE: 0.12 E9/L (ref 0.05–0.5)
EOSINOPHILS RELATIVE PERCENT: 3.7 % (ref 0–6)
GFR AFRICAN AMERICAN: 18
GFR NON-AFRICAN AMERICAN: 18 ML/MIN/1.73
GLUCOSE BLD-MCNC: 38 MG/DL (ref 74–99)
HCT VFR BLD CALC: 27.3 % (ref 34–48)
HEMOGLOBIN: 8.4 G/DL (ref 11.5–15.5)
IMMATURE GRANULOCYTES #: 0.01 E9/L
IMMATURE GRANULOCYTES %: 0.3 % (ref 0–5)
LYMPHOCYTES ABSOLUTE: 0.83 E9/L (ref 1.5–4)
LYMPHOCYTES RELATIVE PERCENT: 25.4 % (ref 20–42)
MAGNESIUM: 1.8 MG/DL (ref 1.6–2.6)
MCH RBC QN AUTO: 29.9 PG (ref 26–35)
MCHC RBC AUTO-ENTMCNC: 30.8 % (ref 32–34.5)
MCV RBC AUTO: 97.2 FL (ref 80–99.9)
METER GLUCOSE: 123 MG/DL (ref 74–99)
METER GLUCOSE: 126 MG/DL (ref 74–99)
METER GLUCOSE: 239 MG/DL (ref 74–99)
METER GLUCOSE: 253 MG/DL (ref 74–99)
METER GLUCOSE: 91 MG/DL (ref 74–99)
METER GLUCOSE: <40 MG/DL (ref 74–99)
MONOCYTES ABSOLUTE: 0.16 E9/L (ref 0.1–0.95)
MONOCYTES RELATIVE PERCENT: 4.9 % (ref 2–12)
NEUTROPHILS ABSOLUTE: 2.1 E9/L (ref 1.8–7.3)
NEUTROPHILS RELATIVE PERCENT: 64.2 % (ref 43–80)
PDW BLD-RTO: 17.3 FL (ref 11.5–15)
PLATELET # BLD: 163 E9/L (ref 130–450)
PMV BLD AUTO: 9.8 FL (ref 7–12)
POTASSIUM SERPL-SCNC: 4.3 MMOL/L (ref 3.5–5)
RBC # BLD: 2.81 E12/L (ref 3.5–5.5)
SODIUM BLD-SCNC: 140 MMOL/L (ref 132–146)
TOTAL PROTEIN: 5.3 G/DL (ref 6.4–8.3)
WBC # BLD: 3.3 E9/L (ref 4.5–11.5)

## 2020-02-05 PROCEDURE — 6360000002 HC RX W HCPCS: Performed by: INTERNAL MEDICINE

## 2020-02-05 PROCEDURE — 6370000000 HC RX 637 (ALT 250 FOR IP): Performed by: INTERNAL MEDICINE

## 2020-02-05 PROCEDURE — 82962 GLUCOSE BLOOD TEST: CPT

## 2020-02-05 PROCEDURE — 36415 COLL VENOUS BLD VENIPUNCTURE: CPT

## 2020-02-05 PROCEDURE — 85025 COMPLETE CBC W/AUTO DIFF WBC: CPT

## 2020-02-05 PROCEDURE — 2580000003 HC RX 258: Performed by: INTERNAL MEDICINE

## 2020-02-05 PROCEDURE — 99233 SBSQ HOSP IP/OBS HIGH 50: CPT | Performed by: INTERNAL MEDICINE

## 2020-02-05 PROCEDURE — 1200000000 HC SEMI PRIVATE

## 2020-02-05 PROCEDURE — 97116 GAIT TRAINING THERAPY: CPT

## 2020-02-05 PROCEDURE — 83735 ASSAY OF MAGNESIUM: CPT

## 2020-02-05 PROCEDURE — 80053 COMPREHEN METABOLIC PANEL: CPT

## 2020-02-05 RX ADMIN — INSULIN LISPRO 6 UNITS: 100 INJECTION, SOLUTION INTRAVENOUS; SUBCUTANEOUS at 17:35

## 2020-02-05 RX ADMIN — DILTIAZEM HYDROCHLORIDE 240 MG: 240 CAPSULE, COATED, EXTENDED RELEASE ORAL at 08:14

## 2020-02-05 RX ADMIN — TRAMADOL HYDROCHLORIDE 50 MG: 50 TABLET, FILM COATED ORAL at 23:55

## 2020-02-05 RX ADMIN — CLONIDINE HYDROCHLORIDE 0.3 MG: 0.2 TABLET ORAL at 08:14

## 2020-02-05 RX ADMIN — CLONIDINE HYDROCHLORIDE 0.3 MG: 0.2 TABLET ORAL at 21:27

## 2020-02-05 RX ADMIN — TRAMADOL HYDROCHLORIDE 50 MG: 50 TABLET, FILM COATED ORAL at 17:48

## 2020-02-05 RX ADMIN — METOPROLOL TARTRATE 100 MG: 25 TABLET ORAL at 08:13

## 2020-02-05 RX ADMIN — AMITRIPTYLINE HYDROCHLORIDE 10 MG: 10 TABLET, FILM COATED ORAL at 21:27

## 2020-02-05 RX ADMIN — HEPARIN SODIUM 5000 UNITS: 5000 INJECTION, SOLUTION INTRAVENOUS; SUBCUTANEOUS at 13:27

## 2020-02-05 RX ADMIN — HEPARIN SODIUM 5000 UNITS: 5000 INJECTION, SOLUTION INTRAVENOUS; SUBCUTANEOUS at 06:09

## 2020-02-05 RX ADMIN — TRAMADOL HYDROCHLORIDE 50 MG: 50 TABLET, FILM COATED ORAL at 08:14

## 2020-02-05 RX ADMIN — ASPIRIN 81 MG 81 MG: 81 TABLET ORAL at 08:14

## 2020-02-05 RX ADMIN — EPOETIN ALFA-EPBX 8000 UNITS: 4000 INJECTION, SOLUTION INTRAVENOUS; SUBCUTANEOUS at 12:29

## 2020-02-05 RX ADMIN — DEXTROSE 50 % IN WATER (D50W) INTRAVENOUS SYRINGE 12.5 G: at 06:27

## 2020-02-05 RX ADMIN — HYDRALAZINE HYDROCHLORIDE 100 MG: 25 TABLET, FILM COATED ORAL at 13:27

## 2020-02-05 RX ADMIN — HYDRALAZINE HYDROCHLORIDE 100 MG: 25 TABLET, FILM COATED ORAL at 21:27

## 2020-02-05 RX ADMIN — HYDRALAZINE HYDROCHLORIDE 100 MG: 25 TABLET, FILM COATED ORAL at 06:15

## 2020-02-05 RX ADMIN — TRAMADOL HYDROCHLORIDE 50 MG: 50 TABLET, FILM COATED ORAL at 01:56

## 2020-02-05 RX ADMIN — ATORVASTATIN CALCIUM 40 MG: 40 TABLET, FILM COATED ORAL at 21:28

## 2020-02-05 RX ADMIN — METOPROLOL TARTRATE 100 MG: 25 TABLET ORAL at 21:28

## 2020-02-05 RX ADMIN — CLONIDINE HYDROCHLORIDE 0.3 MG: 0.2 TABLET ORAL at 13:28

## 2020-02-05 RX ADMIN — HEPARIN SODIUM 5000 UNITS: 5000 INJECTION, SOLUTION INTRAVENOUS; SUBCUTANEOUS at 21:31

## 2020-02-05 RX ADMIN — Medication 10 ML: at 21:31

## 2020-02-05 ASSESSMENT — PAIN DESCRIPTION - PAIN TYPE
TYPE: ACUTE PAIN
TYPE: ACUTE PAIN

## 2020-02-05 ASSESSMENT — PAIN DESCRIPTION - LOCATION
LOCATION: BACK
LOCATION: BACK;LEG

## 2020-02-05 ASSESSMENT — PAIN SCALES - GENERAL
PAINLEVEL_OUTOF10: 0
PAINLEVEL_OUTOF10: 8
PAINLEVEL_OUTOF10: 6
PAINLEVEL_OUTOF10: 7

## 2020-02-05 ASSESSMENT — PAIN DESCRIPTION - FREQUENCY
FREQUENCY: INTERMITTENT
FREQUENCY: INTERMITTENT

## 2020-02-05 ASSESSMENT — PAIN DESCRIPTION - DESCRIPTORS
DESCRIPTORS: ACHING;CONSTANT;CRAMPING
DESCRIPTORS: CRAMPING

## 2020-02-05 ASSESSMENT — PAIN - FUNCTIONAL ASSESSMENT
PAIN_FUNCTIONAL_ASSESSMENT: ACTIVITIES ARE NOT PREVENTED
PAIN_FUNCTIONAL_ASSESSMENT: ACTIVITIES ARE NOT PREVENTED

## 2020-02-05 ASSESSMENT — PAIN DESCRIPTION - PROGRESSION
CLINICAL_PROGRESSION: NOT CHANGED

## 2020-02-05 ASSESSMENT — PAIN DESCRIPTION - ORIENTATION: ORIENTATION: ANTERIOR

## 2020-02-05 ASSESSMENT — PAIN DESCRIPTION - ONSET
ONSET: GRADUAL
ONSET: ON-GOING

## 2020-02-05 NOTE — PROGRESS NOTES
bilaterally-bilateral crackles  Cardiovascular: normal rate, regular rhythm, normal S1 and S2, no murmurs  Abdomen: soft, non-tender, non-distended, normal bowel sounds, no masses or organomegaly  Extremities: no cyanosis, clubbing, bilateral +2-3 lower extremity edema   Musculoskeletal: bilateral feet pain, lower back pain  Neurologic: Moves limbs spontaneously , no cranial nerve deficit, coordination and speech normal      Recent Labs     02/04/20  0145 02/04/20  0157 02/04/20  0735 02/05/20  0600     --  144 140   K 3.3*  --  3.8 4.3     --  108* 106   CO2 25  --  21* 26   BUN 38*  --  36* 37*   CREATININE 3.3*  --  3.3* 3.6*   GLUCOSE 247* 257 326* 38*   CALCIUM 8.4*  --  8.0* 8.1*       Recent Labs     02/04/20  0145 02/04/20  0735 02/05/20  0600   WBC 7.2 6.4 3.3*   RBC 3.15* 3.06* 2.81*   HGB 9.2* 9.1* 8.4*   HCT 29.6* 29.4* 27.3*   MCV 94.0 96.1 97.2   MCH 29.2 29.7 29.9   MCHC 31.1* 31.0* 30.8*   RDW 17.7* 17.7* 17.3*    235 163   MPV 9.5 10.1 9.8         Radiology:   CT Head WO Contrast   Final Result   1. No acute intracranial abnormality, without significant change in   appearance of intracranial contents since 1/22/2020.   2. Sphenoid, bilateral ethmoid, and right maxillary sinus mucosal   disease, greatest involving the right maxillary sinus            XR CHEST PORTABLE   Final Result   1. Findings most likely representing CHF versus atypical infectious   process. Assessment:  Active Problems:    Altered mental status  Resolved Problems:    * No resolved hospital problems. *      Plan:                  1. Fluid overload                  -Increased shortness of breath              -Bilateral lower extremity edema              -Chest x-ray shows vascular congestion flash pulmonary edema              -proBNP over 70,000              -Previous echo done last month showed preserved  ejection  fraction              -Continue Bumex drip as per nephrology  Recommendations.   2. Elevated troponin             - Patient complained of jaw pain before becoming  unresponsive              -Mild elevation in troponin 0.06-likely due to renal  failure but  slightly higher than baseline              -No negative changes in the EKG compared to prior              -Troponin 0.09-->0.10              -Cardiology following and no aggressive cardiac  work-up at  this time. 3. Hypokalemia             - K  3.3-->4.3, resolved. Continue to monitor BMP. 4. CKD              -Creatinine 3.6 and BUN 37              -Around baseline-GFR 20             - Patient is in fluid overload and on Bumex drip             - Nephrology following  5. Diabetes Mellitus             - BS <40 this AM and patient hypoglycemic.   -Given dextrose and repeat BS >123   -Hold home insulin; On ISS. -Continue to monitor BS closely             - Insulin sliding scale  6. Hypertension   -/85 this morning   -On clonidine, hydralazine, Cardizem and metoprolol.   -Due to monitor BP closely  NOTE: This report was transcribed using voice recognition software. Every effort was made to ensure accuracy; however, inadvertent computerized transcription errors may be present.      Electronically signed by Katie Fontenot MD on 2/5/2020 at 8:18 AM

## 2020-02-05 NOTE — CARE COORDINATION
Readmission screening questions:  1. Before your last discharge, were you feeling improved? Not really   2. When did you start not feeling well? Yesterday   3. Where did you go after being discharged? Home   4. Were you given instructions on medications and how to care for yourself? Yes   5. If you went home, did you have help? My mom   6. Did you get your medications filled? yes  7. Did you follow up with your doctor at the office? No   8.  Who instructed you to come back to the hospital? (self-referral, physician's office) mom

## 2020-02-05 NOTE — PROGRESS NOTES
Department of Internal Medicine  Nephrology Attending Progress Note        SUBJECTIVE:  We are following this patient for acute kidney injury on chronic kidney disease stage 4. The patient is table. Less pain. Sleeping. Physical Exam:    VITALS:  BP (!) 154/85   Pulse 91   Temp 99.6 °F (37.6 °C) (Oral)   Resp 18   Ht 5' 4\" (1.626 m)   Wt 171 lb 8 oz (77.8 kg)   LMP 11/16/2019 (LMP Unknown)   SpO2 98%   BMI 29.44 kg/m²   24HR INTAKE/OUTPUT:    Intake/Output Summary (Last 24 hours) at 2/5/2020 1028  Last data filed at 2/5/2020 0948  Gross per 24 hour   Intake 360 ml   Output 2850 ml   Net -2490 ml       Constitutional:  Well built. Poorly nourished. No distress. Access Exam:  None. Cardiovascular/Edema:  Heart sounds normal. No murmur. Respiratory:  Breath sounds normal. No rales or rhonchi. Gastrointestinal:  Bowel sounds normal. No organomegaly. Other:  Edema better. UO good. A/Ox 3. No focal deficit. BEERLA. DATA:    CBC:   Lab Results   Component Value Date    WBC 3.3 02/05/2020    RBC 2.81 02/05/2020    HGB 8.4 02/05/2020    HCT 27.3 02/05/2020    MCV 97.2 02/05/2020    MCH 29.9 02/05/2020    MCHC 30.8 02/05/2020    RDW 17.3 02/05/2020     02/05/2020    MPV 9.8 02/05/2020     CMP:    Lab Results   Component Value Date     02/05/2020    K 4.3 02/05/2020    K 4.4 01/23/2020     02/05/2020    CO2 26 02/05/2020    BUN 37 02/05/2020    CREATININE 3.6 02/05/2020    GFRAA 18 02/05/2020    LABGLOM 18 02/05/2020    GLUCOSE 38 02/05/2020    GLUCOSE 130 05/18/2012    PROT 5.3 02/05/2020    LABALBU 2.3 02/05/2020    LABALBU 4.1 05/18/2012    CALCIUM 8.1 02/05/2020    BILITOT <0.2 02/05/2020    ALKPHOS 164 02/05/2020    AST 21 02/05/2020    ALT 24 02/05/2020       IMPRESSION/RECOMMENDATIONS:      Acute kidney injury. CKD stage 4. Metabolic acidosis. Resolved. Anemia of CKD. Edema.  - Stop bumex drip. - Monitor.  - Discussed with RN.

## 2020-02-05 NOTE — CARE COORDINATION
2-5- Cm note: met with patient for transition of care plans, pt lives alone with her 2 young children, her mother is caring for them while she is hospitalized, pt denies any needs for home . SS/CM will cont to follow .  Thank you Electronically signed by Naeem Albrecht RN on 2/5/2020 at 2:36 PM

## 2020-02-05 NOTE — PROGRESS NOTES
determined        Plan of care: Patient will be seen  daily  for therapeutic exercise, functional retraining, endurance activities, balance exercises, family and patient education. AM-PAC Basic Mobility       AM-PAC Mobility Inpatient   How much difficulty turning over in bed?: None  How much difficulty sitting down on / standing up from a chair with arms?: None  How much difficulty moving from lying on back to sitting on side of bed?: None  How much help from another person moving to and from a bed to a chair?: None  How much help from another person needed to walk in hospital room?: A Little  How much help from another person for climbing 3-5 steps with a railing?: A Little  AM-PAC Inpatient Mobility Raw Score : 22  AM-PAC Inpatient T-Scale Score : 53.28  Mobility Inpatient CMS 0-100% Score: 20.91  Mobility Inpatient CMS G-Code Modifier : [address] cleared patient for PT treatment and patient agreeable. Precautions:  falls and O2 , new 2 Liters of o2 via nasal cannula   Mentation: alert, cooperative, oriented x 3  and follows directions    PAIN: (measured on a visual analog scale with 0=no pain and 10=excruciating pain) 0/10. FUNCTIONAL ASSESSMENT   Bed Mobility- Supine to sit- Independent         Scooting- Independent      Sit to supine- Independent    Patient able to dangle on edge of bed for 5 minutes with Supervision assist      Transfers-Sit to stand- Supervision     Gait:  Patient ambulated 2 x 50 feet using no device with Supervision     Steps:  Not assessed          Treatment: Pt. Transferred to eob and  Sat edge of bed to increase dynamic sitting balance and activity tolerance. Pt. Ambulated in lee as above and returned to bed. Therapist educated and facilitated patient on techniques to increase safety and independence with bed mobility, balance, functional transfers, and functional mobility.      Exercises: not performed        At end of session, patient in bed with  call light and phone within reach,  all lines and tubes intact, nursing notified. Patient continues to benefit from skilled PT to improve functional independence and quality of life. A:  Patient moves slow and guarded, c/o swollen feet. GOALS to be met in 3 days. Bed mobility-  Independent                                         Transfers-Sit to stand-Independent                Gait:  Patient to ambulate 100 feet using no device with Independent                Steps: Patient to go up and down  10  step(s) using 1 rail(s) with  Independent          Increase strength in affected mm groups by 1/3 grade  Increase balance to allow for improvement towards functional goals.   Increase endurance to allow for improvement towards functional goals.     Time in: 08:52  Time out: 09:07    CPT codes:  Gait Training (80486) 15 minutes 1 unit(s)      Edwin Cornejo, PTA

## 2020-02-06 LAB
ALBUMIN SERPL-MCNC: 2.2 G/DL (ref 3.5–5.2)
ALP BLD-CCNC: 156 U/L (ref 35–104)
ALT SERPL-CCNC: 22 U/L (ref 0–32)
ANION GAP SERPL CALCULATED.3IONS-SCNC: 8 MMOL/L (ref 7–16)
AST SERPL-CCNC: 23 U/L (ref 0–31)
BASOPHILS ABSOLUTE: 0.04 E9/L (ref 0–0.2)
BASOPHILS RELATIVE PERCENT: 0.8 % (ref 0–2)
BILIRUB SERPL-MCNC: <0.2 MG/DL (ref 0–1.2)
BUN BLDV-MCNC: 38 MG/DL (ref 6–20)
CALCIUM SERPL-MCNC: 7.9 MG/DL (ref 8.6–10.2)
CHLORIDE BLD-SCNC: 105 MMOL/L (ref 98–107)
CO2: 26 MMOL/L (ref 22–29)
CREAT SERPL-MCNC: 3.5 MG/DL (ref 0.5–1)
EOSINOPHILS ABSOLUTE: 0.54 E9/L (ref 0.05–0.5)
EOSINOPHILS RELATIVE PERCENT: 10.3 % (ref 0–6)
GFR AFRICAN AMERICAN: 19
GFR NON-AFRICAN AMERICAN: 19 ML/MIN/1.73
GLUCOSE BLD-MCNC: 102 MG/DL (ref 74–99)
HCT VFR BLD CALC: 28.5 % (ref 34–48)
HEMOGLOBIN: 8.7 G/DL (ref 11.5–15.5)
IMMATURE GRANULOCYTES #: 0.02 E9/L
IMMATURE GRANULOCYTES %: 0.4 % (ref 0–5)
LYMPHOCYTES ABSOLUTE: 1.08 E9/L (ref 1.5–4)
LYMPHOCYTES RELATIVE PERCENT: 20.7 % (ref 20–42)
MAGNESIUM: 1.9 MG/DL (ref 1.6–2.6)
MCH RBC QN AUTO: 29.5 PG (ref 26–35)
MCHC RBC AUTO-ENTMCNC: 30.5 % (ref 32–34.5)
MCV RBC AUTO: 96.6 FL (ref 80–99.9)
METER GLUCOSE: 128 MG/DL (ref 74–99)
METER GLUCOSE: 277 MG/DL (ref 74–99)
METER GLUCOSE: 347 MG/DL (ref 74–99)
METER GLUCOSE: 80 MG/DL (ref 74–99)
METER GLUCOSE: <40 MG/DL (ref 74–99)
MONOCYTES ABSOLUTE: 0.29 E9/L (ref 0.1–0.95)
MONOCYTES RELATIVE PERCENT: 5.6 % (ref 2–12)
NEUTROPHILS ABSOLUTE: 3.25 E9/L (ref 1.8–7.3)
NEUTROPHILS RELATIVE PERCENT: 62.2 % (ref 43–80)
PDW BLD-RTO: 16.9 FL (ref 11.5–15)
PLATELET # BLD: 148 E9/L (ref 130–450)
PMV BLD AUTO: 10.1 FL (ref 7–12)
POTASSIUM SERPL-SCNC: 3.9 MMOL/L (ref 3.5–5)
RBC # BLD: 2.95 E12/L (ref 3.5–5.5)
SODIUM BLD-SCNC: 139 MMOL/L (ref 132–146)
TOTAL PROTEIN: 5.2 G/DL (ref 6.4–8.3)
WBC # BLD: 5.2 E9/L (ref 4.5–11.5)

## 2020-02-06 PROCEDURE — 83735 ASSAY OF MAGNESIUM: CPT

## 2020-02-06 PROCEDURE — 1200000000 HC SEMI PRIVATE

## 2020-02-06 PROCEDURE — 82962 GLUCOSE BLOOD TEST: CPT

## 2020-02-06 PROCEDURE — 6370000000 HC RX 637 (ALT 250 FOR IP): Performed by: INTERNAL MEDICINE

## 2020-02-06 PROCEDURE — APPSS30 APP SPLIT SHARED TIME 16-30 MINUTES: Performed by: CLINICAL NURSE SPECIALIST

## 2020-02-06 PROCEDURE — 6360000002 HC RX W HCPCS: Performed by: INTERNAL MEDICINE

## 2020-02-06 PROCEDURE — 99233 SBSQ HOSP IP/OBS HIGH 50: CPT | Performed by: INTERNAL MEDICINE

## 2020-02-06 PROCEDURE — 99232 SBSQ HOSP IP/OBS MODERATE 35: CPT | Performed by: INTERNAL MEDICINE

## 2020-02-06 PROCEDURE — 36591 DRAW BLOOD OFF VENOUS DEVICE: CPT

## 2020-02-06 PROCEDURE — 36415 COLL VENOUS BLD VENIPUNCTURE: CPT

## 2020-02-06 PROCEDURE — 85025 COMPLETE CBC W/AUTO DIFF WBC: CPT

## 2020-02-06 PROCEDURE — 97116 GAIT TRAINING THERAPY: CPT

## 2020-02-06 PROCEDURE — 80053 COMPREHEN METABOLIC PANEL: CPT

## 2020-02-06 PROCEDURE — 2580000003 HC RX 258: Performed by: INTERNAL MEDICINE

## 2020-02-06 RX ADMIN — METOPROLOL TARTRATE 100 MG: 25 TABLET ORAL at 09:18

## 2020-02-06 RX ADMIN — TRAMADOL HYDROCHLORIDE 50 MG: 50 TABLET, FILM COATED ORAL at 09:15

## 2020-02-06 RX ADMIN — HEPARIN SODIUM 5000 UNITS: 5000 INJECTION, SOLUTION INTRAVENOUS; SUBCUTANEOUS at 13:58

## 2020-02-06 RX ADMIN — ASPIRIN 81 MG 81 MG: 81 TABLET ORAL at 09:15

## 2020-02-06 RX ADMIN — DEXTROSE 50 % IN WATER (D50W) INTRAVENOUS SYRINGE 12.5 G: at 05:11

## 2020-02-06 RX ADMIN — HYDRALAZINE HYDROCHLORIDE 100 MG: 25 TABLET, FILM COATED ORAL at 21:12

## 2020-02-06 RX ADMIN — HYDRALAZINE HYDROCHLORIDE 100 MG: 25 TABLET, FILM COATED ORAL at 13:58

## 2020-02-06 RX ADMIN — DILTIAZEM HYDROCHLORIDE 240 MG: 240 CAPSULE, COATED, EXTENDED RELEASE ORAL at 09:15

## 2020-02-06 RX ADMIN — TRAMADOL HYDROCHLORIDE 50 MG: 50 TABLET, FILM COATED ORAL at 22:58

## 2020-02-06 RX ADMIN — HEPARIN SODIUM 5000 UNITS: 5000 INJECTION, SOLUTION INTRAVENOUS; SUBCUTANEOUS at 05:39

## 2020-02-06 RX ADMIN — HYDRALAZINE HYDROCHLORIDE 100 MG: 25 TABLET, FILM COATED ORAL at 05:39

## 2020-02-06 RX ADMIN — ATORVASTATIN CALCIUM 40 MG: 40 TABLET, FILM COATED ORAL at 21:13

## 2020-02-06 RX ADMIN — TRAMADOL HYDROCHLORIDE 50 MG: 50 TABLET, FILM COATED ORAL at 15:39

## 2020-02-06 RX ADMIN — HEPARIN SODIUM 5000 UNITS: 5000 INJECTION, SOLUTION INTRAVENOUS; SUBCUTANEOUS at 21:14

## 2020-02-06 RX ADMIN — INSULIN LISPRO 4 UNITS: 100 INJECTION, SOLUTION INTRAVENOUS; SUBCUTANEOUS at 20:55

## 2020-02-06 RX ADMIN — Medication 10 ML: at 21:14

## 2020-02-06 RX ADMIN — INSULIN LISPRO 4 UNITS: 100 INJECTION, SOLUTION INTRAVENOUS; SUBCUTANEOUS at 17:04

## 2020-02-06 RX ADMIN — Medication 10 ML: at 09:19

## 2020-02-06 RX ADMIN — METOPROLOL TARTRATE 100 MG: 25 TABLET ORAL at 21:13

## 2020-02-06 RX ADMIN — CLONIDINE HYDROCHLORIDE 0.3 MG: 0.2 TABLET ORAL at 21:12

## 2020-02-06 RX ADMIN — CLONIDINE HYDROCHLORIDE 0.3 MG: 0.2 TABLET ORAL at 09:15

## 2020-02-06 RX ADMIN — AMITRIPTYLINE HYDROCHLORIDE 10 MG: 10 TABLET, FILM COATED ORAL at 21:13

## 2020-02-06 RX ADMIN — CLONIDINE HYDROCHLORIDE 0.3 MG: 0.2 TABLET ORAL at 13:58

## 2020-02-06 ASSESSMENT — PAIN DESCRIPTION - DESCRIPTORS
DESCRIPTORS: ACHING;CONSTANT
DESCRIPTORS: ACHING;CONSTANT;CRAMPING

## 2020-02-06 ASSESSMENT — PAIN DESCRIPTION - PROGRESSION
CLINICAL_PROGRESSION: NOT CHANGED

## 2020-02-06 ASSESSMENT — PAIN DESCRIPTION - FREQUENCY
FREQUENCY: INTERMITTENT
FREQUENCY: CONTINUOUS

## 2020-02-06 ASSESSMENT — PAIN DESCRIPTION - LOCATION
LOCATION: HEAD
LOCATION: BACK;LEG
LOCATION: BACK

## 2020-02-06 ASSESSMENT — PAIN SCALES - GENERAL
PAINLEVEL_OUTOF10: 8
PAINLEVEL_OUTOF10: 7
PAINLEVEL_OUTOF10: 3
PAINLEVEL_OUTOF10: 7
PAINLEVEL_OUTOF10: 7

## 2020-02-06 ASSESSMENT — PAIN DESCRIPTION - PAIN TYPE
TYPE: ACUTE PAIN

## 2020-02-06 ASSESSMENT — PAIN DESCRIPTION - ONSET
ONSET: ON-GOING
ONSET: ON-GOING

## 2020-02-06 ASSESSMENT — PAIN DESCRIPTION - ORIENTATION: ORIENTATION: LOWER

## 2020-02-06 NOTE — PROGRESS NOTES
determined        Plan of care: Patient will be seen  daily  for therapeutic exercise, functional retraining, endurance activities, balance exercises, family and patient education. AM-PAC Basic Mobility       AM-PAC Mobility Inpatient   How much difficulty turning over in bed?: None  How much difficulty sitting down on / standing up from a chair with arms?: None  How much difficulty moving from lying on back to sitting on side of bed?: None  How much help from another person moving to and from a bed to a chair?: None  How much help from another person needed to walk in hospital room?: A Little  How much help from another person for climbing 3-5 steps with a railing?: A Little  AM-PAC Inpatient Mobility Raw Score : 22  AM-PAC Inpatient T-Scale Score : 53.28  Mobility Inpatient CMS 0-100% Score: 20.91  Mobility Inpatient CMS G-Code Modifier : [address] [facility] cleared patient for PT treatment and patient agreeable. Precautions:  falls and O2 , new 2 Liters of o2 via nasal cannula   Mentation: alert, cooperative, oriented x 3  and follows directions    PAIN: (measured on a visual analog scale with 0=no pain and 10=excruciating pain) 0/10. FUNCTIONAL ASSESSMENT   Bed Mobility- Supine to sit- Independent         Scooting- Independent      Sit to supine- Independent    Patient able to dangle on edge of bed for 5 minutes with Supervision assist      Transfers-Sit to stand- Supervision     Gait:  Patient ambulated 2 x 50 feet using no device with Supervision . SpO2 84% without wearing O2. Replaced  O2  On 2 Liters, SpO2 returned to 90's. Steps:  Not assessed          Treatment: Pt. Transferred to eob and  Sat edge of bed to increase dynamic sitting balance and activity tolerance. Pt. Ambulated in lee as above and returned to bed. Pt. Performed exercises seated at eob.   Therapist educated and facilitated patient on techniques to increase safety and independence with bed mobility, balance, functional transfers, and functional mobility. Exercises: Pt. Performed ankle pumps, laq and marching x 10-15 reps each. At end of session, patient in bed with  call light and phone within reach,  all lines and tubes intact, nursing notified. Patient continues to benefit from skilled PT to improve functional independence and quality of life. A:  Patient SpO2 dropped with activity. GOALS to be met in 3 days. Bed mobility-  Independent                                         Transfers-Sit to stand-Independent                Gait:  Patient to ambulate 100 feet using no device with Independent                Steps: Patient to go up and down  10  step(s) using 1 rail(s) with  Independent          Increase strength in affected mm groups by 1/3 grade  Increase balance to allow for improvement towards functional goals.   Increase endurance to allow for improvement towards functional goals.     Time in: 13:41  Time out: 13:54    CPT codes:  Gait Training (00765) 13 minutes 1 unit(s)      [facility], PTA

## 2020-02-06 NOTE — PROGRESS NOTES
Inpatient Cardiology Progress Note     PATIENT IS BEING FOLLOWED FOR: HFpEF, elevated troponin, hypertension     Wilton Negrete is a 34 y.o. female who has been seen during prior hospitalizations by Dr. Mandi Albert. SUBJECTIVE: She reports feeling weak, loss of appetite, and discomfort in her lower back. She denies chest pain, but states she has been short of breath with activity \"for years\", and the only time she's not short of breath is when she is lying down or sitting. OBJECTIVE: No apparent distress, sitting up in bed. ROS:  Consist: Denies fevers, chills or night sweats  Heart: Denies chest pain, palpitations, lightheadedness, dizziness or syncope  Lungs: Denies cough, wheezing, orthopnea or PND  GI: Denies abdominal pain, vomiting or diarrhea  Positive for headaches when her blood pressure is elevated. PHYSICAL EXAM:   BP (!) 144/86   Pulse 79   Temp 98.3 °F (36.8 °C) (Oral)   Resp 18   Ht 5' 4\" (1.626 m)   Wt 171 lb 8 oz (77.8 kg)   LMP 11/16/2019 (LMP Unknown)   SpO2 99%   BMI 29.44 kg/m²    B/P Range last 24 hours: Systolic (85CYS), MPX:870 , Min:138 , LLN:453    Diastolic (90YHQ), ENB:57, Min:73, Max:92    CONST: Well developed, well nourished female who appears stated age. Awake, alert and cooperative. No apparent distress  HEENT:   Head- Normocephalic, atraumatic   Throat- Oral mucosa pink and moist  Neck-  No stridor, trachea midline, no jugular venous distention or carotid bruit  CHEST: Chest symmetrical and non-tender to palpation. Respirations unlabored. RESPIRATORY:  Breath sounds clear throughout   CARDIOVASCULAR:     Heart Ausculation- Regular rate and rhythm, no murmur, s3, s4 or rub   PV: 1+ bilateral lower extremity edema. No varicosities. Feet warm to touch. ABDOMEN: Soft, non-tender to light palpation. Bowel sounds present. No palpable masses   MS: Good muscle strength and tone. No atrophy or abnormal movements.    : Reyes catheter draining dark yellow have been reviewed personally. My personal assessment and plan as noted below. All orders and medical decision making was done by me. REVIEW OF SYSTEMS:     · Constitutional: Denies fevers, chills, night sweats, and fatigue  · HEENT: Denies headaches, nose bleeds, and blurred vision,oral pain, abscess or lesion. · Musculoskeletal: Denies falls, pain to BLE with ambulation and edema to BLE. · Neurological: Denies dizziness and lightheadedness, numbness and tingling  · Cardiovascular: Denies chest pain, palpitations, and feelings of heart racing. · Respiratory: +chronic shortness of breath. Denies orthopnea and PND  · Gastrointestinal: Denies heartburn, nausea/vomiting, diarrhea and constipation, black/bloody, and tarry stools. · Genitourinary: Denies dysuria and hematuria  · Hematologic: Denies excessive bruising or bleeding      PHYSICAL EXAM:   BP (!) 170/86   Pulse 80   Temp 98.2 °F (36.8 °C) (Oral)   Resp 18   Ht 5' 4\" (1.626 m)   Wt 171 lb 8 oz (77.8 kg)   LMP 11/16/2019 (LMP Unknown)   SpO2 97%   BMI 29.44 kg/m²   CONST:  Well developed, well nourished AAF who appears stated age. Awake, alert, cooperative, no apparent distress  HEENT:   Head- Normocephalic, atraumatic   Eyes- Conjunctivae pink, anicteric  Throat- Oral mucosa pink and moist  Neck-  No stridor, trachea midline, no jugular venous distention. CHEST: Chest symmetrical and non-tender to palpation. No accessory muscle use or intercostal retractions  RESPIRATORY: Lung sounds - mild, if any, rales bilaterally  CARDIOVASCULAR:     No carotid bruit  Heart Inspection- shows no noted pulsations  Heart Palpation- no heaves or thrills; PMI is non-displaced   Heart Ausculation- Regular rate and rhythm, no murmur. No s3, s4 or rub   PV: 1+ lower extremity edema. No varicosities. Pedal pulses palpable, no clubbing or cyanosis   ABDOMEN: Soft, non-tender to light palpation. Bowel sounds present.  No palpable masses no organomegaly; no abdominal bruit  MS: Good muscle strength and tone. No atrophy or abnormal movements. : Deferred  SKIN: Warm and dry no statis dermatitis or ulcers   NEURO / PSYCH: Oriented to person, place and time. Speech clear and appropriate. Follows all commands. Pleasant affect         ASSESSMENT:  1. Acute HFpEF/Volume overload likely due to worsening kidney disease                    · EF 60% with no significant valvular disease on TTE 1/2020  · Does not appear significantly volume overloaded today  · She reports chronic dyspnea with mild exertion: ? deconditioning   2. Elevated troponin, chronically elevated. Likely type II due to demand ischemia due to substance abuse, BC on CKD and acute diastolic congestive heart failure. 3. Hypertension   4. BC on CKD  5. Chronic anemia  6. Altered mental status : resolved   7. Hypoalbuminemia         PLAN:  1. Continue current antihypertensive management, blood pressure has improved since admission. 2. Volume/fluid management per nephrology service. 3. No further cardiac work up indicated at this time. We will see as needed. 4. Call with any questions or concerns. Celeste Machuca, [facility] [place], [id number] [facility]

## 2020-02-06 NOTE — PROGRESS NOTES
clear to auscultation bilaterally-bilateral crackles  Cardiovascular: normal rate, regular rhythm, normal S1 and S2, no murmurs  Abdomen: soft, non-tender, non-distended, normal bowel sounds, no masses or organomegaly  Extremities: no cyanosis, clubbing, bilateral +2 lower extremity edema   Musculoskeletal: bilateral feet pain, lower back pain  Neurologic: Moves limbs spontaneously , no cranial nerve deficit, coordination and speech normal      Recent Labs     02/04/20  0735 02/05/20  0600 02/06/20  0520    140 139   K 3.8 4.3 3.9   * 106 105   CO2 21* 26 26   BUN 36* 37* 38*   CREATININE 3.3* 3.6* 3.5*   GLUCOSE 326* 38* 102*   CALCIUM 8.0* 8.1* 7.9*       Recent Labs     02/04/20  0735 02/05/20  0600 02/06/20  0520   WBC 6.4 3.3* 5.2   RBC 3.06* 2.81* 2.95*   HGB 9.1* 8.4* 8.7*   HCT 29.4* 27.3* 28.5*   MCV 96.1 97.2 96.6   MCH 29.7 29.9 29.5   MCHC 31.0* 30.8* 30.5*   RDW 17.7* 17.3* 16.9*    163 148   MPV 10.1 9.8 10.1         Radiology:   CT Head WO Contrast   Final Result   1. No acute intracranial abnormality, without significant change in   appearance of intracranial contents since 1/22/2020.   2. Sphenoid, bilateral ethmoid, and right maxillary sinus mucosal   disease, greatest involving the right maxillary sinus            XR CHEST PORTABLE   Final Result   1. Findings most likely representing CHF versus atypical infectious   process. Assessment:  Active Problems:    Altered mental status  Resolved Problems:    * No resolved hospital problems. *      Plan:                  1. Fluid overload                  -Increased shortness of breath              -Bilateral lower extremity edema              -Chest x-ray shows vascular congestion flash pulmonary edema              -proBNP over 70,000              -Previous echo done last month showed preserved  ejection  Fraction   --4.250 L balance since admission.              -Off Bumex drip as per nephrology recommendations.   2. Elevated troponin             - Patient complained of jaw pain before becoming  unresponsive              -Mild elevation in troponin 0.06-likely due to renal  failure but  slightly higher than baseline              -No negative changes in the EKG compared to prior              -Troponin 0.09-->0.10              -Cardiology following and no aggressive cardiac work-up at  this time. 3. Hypokalemia             - K  3.3 on presentation. K 3.9 today, resolved. Continue to monitor  BMP. 4. CKD              -Creatinine 3.5 and BUN 38              -Around baseline-GFR 20             - Patient is in fluid overload and on Bumex drip             - Nephrology following  5. Diabetes Mellitus             -  this AM    -Hold home insulin; On ISS. -Continue to monitor BS closely             - Insulin sliding scale  6. Hypertension   -/86 this morning   -On clonidine, hydralazine, Cardizem and metoprolol.   -Continue to monitor BP closely  NOTE: This report was transcribed using voice recognition software. Every effort was made to ensure accuracy; however, inadvertent computerized transcription errors may be present.      Electronically signed by José Miguel Kim MD on 2/6/2020 at 8:03 AM

## 2020-02-07 PROBLEM — E13.69 OTHER SPECIFIED DIABETES MELLITUS WITH OTHER SPECIFIED COMPLICATION (HCC): Status: ACTIVE | Noted: 2018-08-17

## 2020-02-07 LAB
ALBUMIN SERPL-MCNC: 2.4 G/DL (ref 3.5–5.2)
ALP BLD-CCNC: 176 U/L (ref 35–104)
ALT SERPL-CCNC: 27 U/L (ref 0–32)
ANION GAP SERPL CALCULATED.3IONS-SCNC: 9 MMOL/L (ref 7–16)
AST SERPL-CCNC: 34 U/L (ref 0–31)
BASOPHILS ABSOLUTE: 0.05 E9/L (ref 0–0.2)
BASOPHILS RELATIVE PERCENT: 0.9 % (ref 0–2)
BILIRUB SERPL-MCNC: <0.2 MG/DL (ref 0–1.2)
BUN BLDV-MCNC: 49 MG/DL (ref 6–20)
CALCIUM SERPL-MCNC: 8 MG/DL (ref 8.6–10.2)
CHLORIDE BLD-SCNC: 98 MMOL/L (ref 98–107)
CO2: 26 MMOL/L (ref 22–29)
CREAT SERPL-MCNC: 3.5 MG/DL (ref 0.5–1)
EOSINOPHILS ABSOLUTE: 0.59 E9/L (ref 0.05–0.5)
EOSINOPHILS RELATIVE PERCENT: 11.2 % (ref 0–6)
GFR AFRICAN AMERICAN: 19
GFR NON-AFRICAN AMERICAN: 19 ML/MIN/1.73
GLUCOSE BLD-MCNC: 345 MG/DL (ref 74–99)
HCT VFR BLD CALC: 28.7 % (ref 34–48)
HEMOGLOBIN: 8.7 G/DL (ref 11.5–15.5)
IMMATURE GRANULOCYTES #: 0.02 E9/L
IMMATURE GRANULOCYTES %: 0.4 % (ref 0–5)
LYMPHOCYTES ABSOLUTE: 1.08 E9/L (ref 1.5–4)
LYMPHOCYTES RELATIVE PERCENT: 20.4 % (ref 20–42)
MAGNESIUM: 1.9 MG/DL (ref 1.6–2.6)
MCH RBC QN AUTO: 29.6 PG (ref 26–35)
MCHC RBC AUTO-ENTMCNC: 30.3 % (ref 32–34.5)
MCV RBC AUTO: 97.6 FL (ref 80–99.9)
METER GLUCOSE: 207 MG/DL (ref 74–99)
METER GLUCOSE: 211 MG/DL (ref 74–99)
METER GLUCOSE: 355 MG/DL (ref 74–99)
METER GLUCOSE: 360 MG/DL (ref 74–99)
METER GLUCOSE: 376 MG/DL (ref 74–99)
MONOCYTES ABSOLUTE: 0.29 E9/L (ref 0.1–0.95)
MONOCYTES RELATIVE PERCENT: 5.5 % (ref 2–12)
NEUTROPHILS ABSOLUTE: 3.26 E9/L (ref 1.8–7.3)
NEUTROPHILS RELATIVE PERCENT: 61.6 % (ref 43–80)
PDW BLD-RTO: 16.8 FL (ref 11.5–15)
PLATELET # BLD: 146 E9/L (ref 130–450)
PMV BLD AUTO: 11.4 FL (ref 7–12)
POTASSIUM SERPL-SCNC: 5.3 MMOL/L (ref 3.5–5)
RBC # BLD: 2.94 E12/L (ref 3.5–5.5)
SODIUM BLD-SCNC: 133 MMOL/L (ref 132–146)
TOTAL PROTEIN: 5.5 G/DL (ref 6.4–8.3)
WBC # BLD: 5.3 E9/L (ref 4.5–11.5)

## 2020-02-07 PROCEDURE — 6360000002 HC RX W HCPCS: Performed by: INTERNAL MEDICINE

## 2020-02-07 PROCEDURE — APPSS30 APP SPLIT SHARED TIME 16-30 MINUTES: Performed by: NURSE PRACTITIONER

## 2020-02-07 PROCEDURE — 2580000003 HC RX 258: Performed by: INTERNAL MEDICINE

## 2020-02-07 PROCEDURE — 6370000000 HC RX 637 (ALT 250 FOR IP): Performed by: INTERNAL MEDICINE

## 2020-02-07 PROCEDURE — 85025 COMPLETE CBC W/AUTO DIFF WBC: CPT

## 2020-02-07 PROCEDURE — 97116 GAIT TRAINING THERAPY: CPT

## 2020-02-07 PROCEDURE — 2500000003 HC RX 250 WO HCPCS: Performed by: INTERNAL MEDICINE

## 2020-02-07 PROCEDURE — 6370000000 HC RX 637 (ALT 250 FOR IP): Performed by: NURSE PRACTITIONER

## 2020-02-07 PROCEDURE — 36415 COLL VENOUS BLD VENIPUNCTURE: CPT

## 2020-02-07 PROCEDURE — 2700000000 HC OXYGEN THERAPY PER DAY

## 2020-02-07 PROCEDURE — 80053 COMPREHEN METABOLIC PANEL: CPT

## 2020-02-07 PROCEDURE — 82962 GLUCOSE BLOOD TEST: CPT

## 2020-02-07 PROCEDURE — 83735 ASSAY OF MAGNESIUM: CPT

## 2020-02-07 PROCEDURE — 36591 DRAW BLOOD OFF VENOUS DEVICE: CPT

## 2020-02-07 PROCEDURE — 1200000000 HC SEMI PRIVATE

## 2020-02-07 PROCEDURE — 99233 SBSQ HOSP IP/OBS HIGH 50: CPT | Performed by: INTERNAL MEDICINE

## 2020-02-07 RX ORDER — BUMETANIDE 1 MG/1
1 TABLET ORAL 2 TIMES DAILY
Status: DISCONTINUED | OUTPATIENT
Start: 2020-02-07 | End: 2020-02-09

## 2020-02-07 RX ORDER — CEPHALEXIN 250 MG/1
250 CAPSULE ORAL EVERY 8 HOURS SCHEDULED
Status: DISCONTINUED | OUTPATIENT
Start: 2020-02-07 | End: 2020-02-09

## 2020-02-07 RX ADMIN — BUMETANIDE 1 MG: 1 TABLET ORAL at 21:48

## 2020-02-07 RX ADMIN — HYDRALAZINE HYDROCHLORIDE 100 MG: 25 TABLET, FILM COATED ORAL at 05:17

## 2020-02-07 RX ADMIN — METOPROLOL TARTRATE 100 MG: 25 TABLET ORAL at 21:48

## 2020-02-07 RX ADMIN — ATORVASTATIN CALCIUM 40 MG: 40 TABLET, FILM COATED ORAL at 21:49

## 2020-02-07 RX ADMIN — TRAMADOL HYDROCHLORIDE 50 MG: 50 TABLET, FILM COATED ORAL at 08:15

## 2020-02-07 RX ADMIN — INSULIN LISPRO 10 UNITS: 100 INJECTION, SOLUTION INTRAVENOUS; SUBCUTANEOUS at 11:46

## 2020-02-07 RX ADMIN — CLONIDINE HYDROCHLORIDE 0.3 MG: 0.2 TABLET ORAL at 08:15

## 2020-02-07 RX ADMIN — ACETAMINOPHEN 650 MG: 325 TABLET, FILM COATED ORAL at 21:47

## 2020-02-07 RX ADMIN — AMITRIPTYLINE HYDROCHLORIDE 10 MG: 10 TABLET, FILM COATED ORAL at 21:48

## 2020-02-07 RX ADMIN — HEPARIN SODIUM 5000 UNITS: 5000 INJECTION, SOLUTION INTRAVENOUS; SUBCUTANEOUS at 13:39

## 2020-02-07 RX ADMIN — INSULIN LISPRO 8 UNITS: 100 INJECTION, SOLUTION INTRAVENOUS; SUBCUTANEOUS at 08:23

## 2020-02-07 RX ADMIN — Medication 10 ML: at 21:50

## 2020-02-07 RX ADMIN — TRAMADOL HYDROCHLORIDE 50 MG: 50 TABLET, FILM COATED ORAL at 20:14

## 2020-02-07 RX ADMIN — CLONIDINE HYDROCHLORIDE 0.3 MG: 0.2 TABLET ORAL at 13:38

## 2020-02-07 RX ADMIN — ASPIRIN 81 MG 81 MG: 81 TABLET ORAL at 08:15

## 2020-02-07 RX ADMIN — DILTIAZEM HYDROCHLORIDE 240 MG: 240 CAPSULE, COATED, EXTENDED RELEASE ORAL at 08:15

## 2020-02-07 RX ADMIN — HYDRALAZINE HYDROCHLORIDE 100 MG: 25 TABLET, FILM COATED ORAL at 21:47

## 2020-02-07 RX ADMIN — Medication 10 ML: at 08:17

## 2020-02-07 RX ADMIN — INSULIN LISPRO 2 UNITS: 100 INJECTION, SOLUTION INTRAVENOUS; SUBCUTANEOUS at 20:15

## 2020-02-07 RX ADMIN — EPOETIN ALFA-EPBX 8000 UNITS: 4000 INJECTION, SOLUTION INTRAVENOUS; SUBCUTANEOUS at 08:16

## 2020-02-07 RX ADMIN — HEPARIN SODIUM 5000 UNITS: 5000 INJECTION, SOLUTION INTRAVENOUS; SUBCUTANEOUS at 21:54

## 2020-02-07 RX ADMIN — HEPARIN SODIUM 5000 UNITS: 5000 INJECTION, SOLUTION INTRAVENOUS; SUBCUTANEOUS at 05:20

## 2020-02-07 RX ADMIN — METOPROLOL TARTRATE 100 MG: 25 TABLET ORAL at 08:16

## 2020-02-07 RX ADMIN — BUMETANIDE 1 MG: 1 TABLET ORAL at 14:14

## 2020-02-07 RX ADMIN — LABETALOL HYDROCHLORIDE 10 MG: 5 INJECTION, SOLUTION INTRAVENOUS at 08:16

## 2020-02-07 RX ADMIN — CLONIDINE HYDROCHLORIDE 0.3 MG: 0.2 TABLET ORAL at 21:47

## 2020-02-07 RX ADMIN — HYDRALAZINE HYDROCHLORIDE 100 MG: 25 TABLET, FILM COATED ORAL at 13:38

## 2020-02-07 RX ADMIN — CEPHALEXIN 250 MG: 250 CAPSULE ORAL at 21:46

## 2020-02-07 RX ADMIN — TRAMADOL HYDROCHLORIDE 50 MG: 50 TABLET, FILM COATED ORAL at 14:14

## 2020-02-07 ASSESSMENT — PAIN DESCRIPTION - ONSET: ONSET: ON-GOING

## 2020-02-07 ASSESSMENT — PAIN DESCRIPTION - DESCRIPTORS: DESCRIPTORS: ACHING;STABBING

## 2020-02-07 ASSESSMENT — PAIN DESCRIPTION - PROGRESSION: CLINICAL_PROGRESSION: NOT CHANGED

## 2020-02-07 ASSESSMENT — PAIN DESCRIPTION - FREQUENCY: FREQUENCY: CONTINUOUS

## 2020-02-07 ASSESSMENT — PAIN SCALES - GENERAL
PAINLEVEL_OUTOF10: 8
PAINLEVEL_OUTOF10: 9
PAINLEVEL_OUTOF10: 7
PAINLEVEL_OUTOF10: 7
PAINLEVEL_OUTOF10: 10
PAINLEVEL_OUTOF10: 8
PAINLEVEL_OUTOF10: 8

## 2020-02-07 ASSESSMENT — PAIN DESCRIPTION - ORIENTATION: ORIENTATION: LOWER;LEFT

## 2020-02-07 ASSESSMENT — PAIN DESCRIPTION - LOCATION
LOCATION: BACK
LOCATION: BACK;HAND

## 2020-02-07 ASSESSMENT — PAIN DESCRIPTION - PAIN TYPE
TYPE: ACUTE PAIN
TYPE: ACUTE PAIN

## 2020-02-07 ASSESSMENT — PAIN - FUNCTIONAL ASSESSMENT: PAIN_FUNCTIONAL_ASSESSMENT: PREVENTS OR INTERFERES SOME ACTIVE ACTIVITIES AND ADLS

## 2020-02-07 NOTE — PROGRESS NOTES
Pt. Complains of hallucinating she explained that she is seeing shadows and her eyesight is blurry. Pt. Stated \"I just don't feel right\". Pt. Vitals and blood sugars are within normal limits. Charge nurse rosi notified.

## 2020-02-07 NOTE — CARE COORDINATION
2-7- Met with patient re: most likely need for home oxygen, i gave patient a list of oxygen providers, [id number] and shower chair, called and left a message [phone] , gave referral  for oxygen and shower chair. will need o2 testing . Thank you Electronically signed by Orion Arevalo RN on 2/7/2020 at 11:51 AM     The Plan for Transition of Care is related to the following treatment goals: home with DME     The Patient was provided with a choice of provider and agrees   with the discharge plan. [x] Yes [] No    Freedom of choice list was provided with basic dialogue that supports the patient's individualized plan of care/goals, treatment preferences and shares the quality data associated with the providers.  [x] Yes [] No

## 2020-02-07 NOTE — PLAN OF CARE
Problem: Falls - Risk of:  Goal: Will remain free from falls  Description  Will remain free from falls  2/7/2020 0649 by Cesar Fairchild RN  Outcome: Met This Shift     Problem: Pain:  Goal: Pain level will decrease  Description  Pain level will decrease  2/7/2020 0649 by Cesar Fairchild RN  Outcome: Met This Shift

## 2020-02-08 ENCOUNTER — APPOINTMENT (OUTPATIENT)
Dept: ULTRASOUND IMAGING | Age: 30
DRG: 194 | End: 2020-02-08
Payer: COMMERCIAL

## 2020-02-08 ENCOUNTER — APPOINTMENT (OUTPATIENT)
Dept: GENERAL RADIOLOGY | Age: 30
DRG: 194 | End: 2020-02-08
Payer: COMMERCIAL

## 2020-02-08 LAB
ALBUMIN SERPL-MCNC: 2.3 G/DL (ref 3.5–5.2)
ALP BLD-CCNC: 193 U/L (ref 35–104)
ALT SERPL-CCNC: 25 U/L (ref 0–32)
ANION GAP SERPL CALCULATED.3IONS-SCNC: 10 MMOL/L (ref 7–16)
ANION GAP SERPL CALCULATED.3IONS-SCNC: 11 MMOL/L (ref 7–16)
ANION GAP SERPL CALCULATED.3IONS-SCNC: 13 MMOL/L (ref 7–16)
ANION GAP SERPL CALCULATED.3IONS-SCNC: 14 MMOL/L (ref 7–16)
AST SERPL-CCNC: 28 U/L (ref 0–31)
BACTERIA: ABNORMAL /HPF
BASOPHILS ABSOLUTE: 0.05 E9/L (ref 0–0.2)
BASOPHILS RELATIVE PERCENT: 0.9 % (ref 0–2)
BILIRUB SERPL-MCNC: <0.2 MG/DL (ref 0–1.2)
BILIRUBIN URINE: NEGATIVE
BLOOD, URINE: ABNORMAL
BUN BLDV-MCNC: 59 MG/DL (ref 6–20)
BUN BLDV-MCNC: 60 MG/DL (ref 6–20)
BUN BLDV-MCNC: 62 MG/DL (ref 6–20)
BUN BLDV-MCNC: 63 MG/DL (ref 6–20)
CALCIUM SERPL-MCNC: 7.5 MG/DL (ref 8.6–10.2)
CALCIUM SERPL-MCNC: 7.9 MG/DL (ref 8.6–10.2)
CALCIUM SERPL-MCNC: 8.3 MG/DL (ref 8.6–10.2)
CALCIUM SERPL-MCNC: 8.4 MG/DL (ref 8.6–10.2)
CHLORIDE BLD-SCNC: 100 MMOL/L (ref 98–107)
CHLORIDE BLD-SCNC: 97 MMOL/L (ref 98–107)
CHLORIDE BLD-SCNC: 98 MMOL/L (ref 98–107)
CHLORIDE BLD-SCNC: 98 MMOL/L (ref 98–107)
CLARITY: ABNORMAL
CO2: 18 MMOL/L (ref 22–29)
CO2: 22 MMOL/L (ref 22–29)
CO2: 23 MMOL/L (ref 22–29)
CO2: 24 MMOL/L (ref 22–29)
COLOR: ABNORMAL
CREAT SERPL-MCNC: 3.6 MG/DL (ref 0.5–1)
CREAT SERPL-MCNC: 3.7 MG/DL (ref 0.5–1)
CREAT SERPL-MCNC: 3.7 MG/DL (ref 0.5–1)
CREAT SERPL-MCNC: 3.8 MG/DL (ref 0.5–1)
EOSINOPHILS ABSOLUTE: 0.53 E9/L (ref 0.05–0.5)
EOSINOPHILS RELATIVE PERCENT: 9.2 % (ref 0–6)
GFR AFRICAN AMERICAN: 17
GFR AFRICAN AMERICAN: 18
GFR NON-AFRICAN AMERICAN: 17 ML/MIN/1.73
GFR NON-AFRICAN AMERICAN: 18 ML/MIN/1.73
GLUCOSE BLD-MCNC: 219 MG/DL (ref 74–99)
GLUCOSE BLD-MCNC: 499 MG/DL (ref 74–99)
GLUCOSE BLD-MCNC: 607 MG/DL (ref 74–99)
GLUCOSE BLD-MCNC: 95 MG/DL (ref 74–99)
GLUCOSE URINE: >=1000 MG/DL
HCT VFR BLD CALC: 28.4 % (ref 34–48)
HEMOGLOBIN: 8.9 G/DL (ref 11.5–15.5)
IMMATURE GRANULOCYTES #: 0.01 E9/L
IMMATURE GRANULOCYTES %: 0.2 % (ref 0–5)
KETONES, URINE: ABNORMAL MG/DL
LEUKOCYTE ESTERASE, URINE: ABNORMAL
LYMPHOCYTES ABSOLUTE: 1.4 E9/L (ref 1.5–4)
LYMPHOCYTES RELATIVE PERCENT: 24.3 % (ref 20–42)
MAGNESIUM: 2 MG/DL (ref 1.6–2.6)
MCH RBC QN AUTO: 29.7 PG (ref 26–35)
MCHC RBC AUTO-ENTMCNC: 31.3 % (ref 32–34.5)
MCV RBC AUTO: 94.7 FL (ref 80–99.9)
METER GLUCOSE: 249 MG/DL (ref 74–99)
METER GLUCOSE: 436 MG/DL (ref 74–99)
METER GLUCOSE: 47 MG/DL (ref 74–99)
METER GLUCOSE: 70 MG/DL (ref 74–99)
METER GLUCOSE: >500 MG/DL (ref 74–99)
METER GLUCOSE: >500 MG/DL (ref 74–99)
MONOCYTES ABSOLUTE: 0.36 E9/L (ref 0.1–0.95)
MONOCYTES RELATIVE PERCENT: 6.3 % (ref 2–12)
NEUTROPHILS ABSOLUTE: 3.41 E9/L (ref 1.8–7.3)
NEUTROPHILS RELATIVE PERCENT: 59.1 % (ref 43–80)
NITRITE, URINE: POSITIVE
ORGANISM: ABNORMAL
PDW BLD-RTO: 15.9 FL (ref 11.5–15)
PH UA: 6 (ref 5–9)
PLATELET # BLD: 185 E9/L (ref 130–450)
PMV BLD AUTO: 10.5 FL (ref 7–12)
POTASSIUM SERPL-SCNC: 4.9 MMOL/L (ref 3.5–5)
POTASSIUM SERPL-SCNC: 5 MMOL/L (ref 3.5–5)
POTASSIUM SERPL-SCNC: 5.5 MMOL/L (ref 3.5–5)
POTASSIUM SERPL-SCNC: 6.5 MMOL/L (ref 3.5–5)
PROCALCITONIN: 0.87 NG/ML (ref 0–0.08)
PROTEIN UA: 100 MG/DL
RBC # BLD: 3 E12/L (ref 3.5–5.5)
RBC UA: ABNORMAL /HPF (ref 0–2)
SODIUM BLD-SCNC: 129 MMOL/L (ref 132–146)
SODIUM BLD-SCNC: 130 MMOL/L (ref 132–146)
SODIUM BLD-SCNC: 134 MMOL/L (ref 132–146)
SODIUM BLD-SCNC: 135 MMOL/L (ref 132–146)
SPECIFIC GRAVITY UA: 1.01 (ref 1–1.03)
TOTAL PROTEIN: 5.6 G/DL (ref 6.4–8.3)
URIC ACID, SERUM: 6.9 MG/DL (ref 2.4–5.7)
URINE CULTURE, ROUTINE: ABNORMAL
UROBILINOGEN, URINE: 0.2 E.U./DL
WBC # BLD: 5.8 E9/L (ref 4.5–11.5)
WBC UA: >20 /HPF (ref 0–5)

## 2020-02-08 PROCEDURE — 6360000002 HC RX W HCPCS: Performed by: INTERNAL MEDICINE

## 2020-02-08 PROCEDURE — 83735 ASSAY OF MAGNESIUM: CPT

## 2020-02-08 PROCEDURE — 87077 CULTURE AEROBIC IDENTIFY: CPT

## 2020-02-08 PROCEDURE — 84550 ASSAY OF BLOOD/URIC ACID: CPT

## 2020-02-08 PROCEDURE — 80053 COMPREHEN METABOLIC PANEL: CPT

## 2020-02-08 PROCEDURE — 80048 BASIC METABOLIC PNL TOTAL CA: CPT

## 2020-02-08 PROCEDURE — 87040 BLOOD CULTURE FOR BACTERIA: CPT

## 2020-02-08 PROCEDURE — 6370000000 HC RX 637 (ALT 250 FOR IP): Performed by: INTERNAL MEDICINE

## 2020-02-08 PROCEDURE — 84145 PROCALCITONIN (PCT): CPT

## 2020-02-08 PROCEDURE — 85025 COMPLETE CBC W/AUTO DIFF WBC: CPT

## 2020-02-08 PROCEDURE — 87088 URINE BACTERIA CULTURE: CPT

## 2020-02-08 PROCEDURE — 36415 COLL VENOUS BLD VENIPUNCTURE: CPT

## 2020-02-08 PROCEDURE — 87186 SC STD MICRODIL/AGAR DIL: CPT

## 2020-02-08 PROCEDURE — 2700000000 HC OXYGEN THERAPY PER DAY

## 2020-02-08 PROCEDURE — 2580000003 HC RX 258: Performed by: SPECIALIST

## 2020-02-08 PROCEDURE — 6370000000 HC RX 637 (ALT 250 FOR IP): Performed by: NURSE PRACTITIONER

## 2020-02-08 PROCEDURE — 2580000003 HC RX 258: Performed by: INTERNAL MEDICINE

## 2020-02-08 PROCEDURE — 36592 COLLECT BLOOD FROM PICC: CPT

## 2020-02-08 PROCEDURE — 99232 SBSQ HOSP IP/OBS MODERATE 35: CPT | Performed by: INTERNAL MEDICINE

## 2020-02-08 PROCEDURE — 93971 EXTREMITY STUDY: CPT

## 2020-02-08 PROCEDURE — APPSS30 APP SPLIT SHARED TIME 16-30 MINUTES: Performed by: NURSE PRACTITIONER

## 2020-02-08 PROCEDURE — 73130 X-RAY EXAM OF HAND: CPT

## 2020-02-08 PROCEDURE — 81001 URINALYSIS AUTO W/SCOPE: CPT

## 2020-02-08 PROCEDURE — 82962 GLUCOSE BLOOD TEST: CPT

## 2020-02-08 PROCEDURE — 6360000002 HC RX W HCPCS: Performed by: SPECIALIST

## 2020-02-08 PROCEDURE — 87081 CULTURE SCREEN ONLY: CPT

## 2020-02-08 PROCEDURE — 1200000000 HC SEMI PRIVATE

## 2020-02-08 PROCEDURE — 93970 EXTREMITY STUDY: CPT

## 2020-02-08 RX ORDER — INSULIN GLARGINE 100 [IU]/ML
25 INJECTION, SOLUTION SUBCUTANEOUS NIGHTLY
Status: DISCONTINUED | OUTPATIENT
Start: 2020-02-09 | End: 2020-02-10

## 2020-02-08 RX ORDER — ERGOCALCIFEROL 1.25 MG/1
50000 CAPSULE ORAL WEEKLY
Status: DISCONTINUED | OUTPATIENT
Start: 2020-02-08 | End: 2020-02-15 | Stop reason: HOSPADM

## 2020-02-08 RX ORDER — INSULIN GLARGINE 100 [IU]/ML
25 INJECTION, SOLUTION SUBCUTANEOUS ONCE
Status: COMPLETED | OUTPATIENT
Start: 2020-02-08 | End: 2020-02-08

## 2020-02-08 RX ORDER — DEXTROSE MONOHYDRATE 50 MG/ML
100 INJECTION, SOLUTION INTRAVENOUS PRN
Status: DISCONTINUED | OUTPATIENT
Start: 2020-02-08 | End: 2020-02-15 | Stop reason: HOSPADM

## 2020-02-08 RX ORDER — DEXTROSE MONOHYDRATE 25 G/50ML
12.5 INJECTION, SOLUTION INTRAVENOUS PRN
Status: DISCONTINUED | OUTPATIENT
Start: 2020-02-08 | End: 2020-02-15 | Stop reason: HOSPADM

## 2020-02-08 RX ORDER — NICOTINE POLACRILEX 4 MG
15 LOZENGE BUCCAL PRN
Status: DISCONTINUED | OUTPATIENT
Start: 2020-02-08 | End: 2020-02-15 | Stop reason: HOSPADM

## 2020-02-08 RX ADMIN — Medication 10 ML: at 21:00

## 2020-02-08 RX ADMIN — TRAMADOL HYDROCHLORIDE 50 MG: 50 TABLET, FILM COATED ORAL at 02:24

## 2020-02-08 RX ADMIN — BUMETANIDE 1 MG: 1 TABLET ORAL at 09:35

## 2020-02-08 RX ADMIN — AMITRIPTYLINE HYDROCHLORIDE 10 MG: 10 TABLET, FILM COATED ORAL at 21:55

## 2020-02-08 RX ADMIN — DILTIAZEM HYDROCHLORIDE 240 MG: 240 CAPSULE, COATED, EXTENDED RELEASE ORAL at 09:35

## 2020-02-08 RX ADMIN — CEPHALEXIN 250 MG: 250 CAPSULE ORAL at 21:56

## 2020-02-08 RX ADMIN — CEPHALEXIN 250 MG: 250 CAPSULE ORAL at 05:36

## 2020-02-08 RX ADMIN — VANCOMYCIN HYDROCHLORIDE 1000 MG: 1 INJECTION, POWDER, LYOPHILIZED, FOR SOLUTION INTRAVENOUS at 18:31

## 2020-02-08 RX ADMIN — HYDRALAZINE HYDROCHLORIDE 100 MG: 25 TABLET, FILM COATED ORAL at 14:35

## 2020-02-08 RX ADMIN — INSULIN LISPRO 6 UNITS: 100 INJECTION, SOLUTION INTRAVENOUS; SUBCUTANEOUS at 15:41

## 2020-02-08 RX ADMIN — TRAMADOL HYDROCHLORIDE 50 MG: 50 TABLET, FILM COATED ORAL at 09:35

## 2020-02-08 RX ADMIN — HEPARIN SODIUM 5000 UNITS: 5000 INJECTION, SOLUTION INTRAVENOUS; SUBCUTANEOUS at 21:57

## 2020-02-08 RX ADMIN — INSULIN LISPRO 18 UNITS: 100 INJECTION, SOLUTION INTRAVENOUS; SUBCUTANEOUS at 11:07

## 2020-02-08 RX ADMIN — METOPROLOL TARTRATE 100 MG: 25 TABLET ORAL at 09:35

## 2020-02-08 RX ADMIN — METOPROLOL TARTRATE 100 MG: 25 TABLET ORAL at 21:53

## 2020-02-08 RX ADMIN — CLONIDINE HYDROCHLORIDE 0.3 MG: 0.2 TABLET ORAL at 21:54

## 2020-02-08 RX ADMIN — HYDRALAZINE HYDROCHLORIDE 100 MG: 25 TABLET, FILM COATED ORAL at 05:36

## 2020-02-08 RX ADMIN — INSULIN LISPRO 12 UNITS: 100 INJECTION, SOLUTION INTRAVENOUS; SUBCUTANEOUS at 09:35

## 2020-02-08 RX ADMIN — HYDRALAZINE HYDROCHLORIDE 100 MG: 25 TABLET, FILM COATED ORAL at 21:54

## 2020-02-08 RX ADMIN — BUMETANIDE 1 MG: 1 TABLET ORAL at 21:54

## 2020-02-08 RX ADMIN — ASPIRIN 81 MG 81 MG: 81 TABLET ORAL at 09:35

## 2020-02-08 RX ADMIN — ERGOCALCIFEROL 50000 UNITS: 1.25 CAPSULE ORAL at 21:55

## 2020-02-08 RX ADMIN — TRAMADOL HYDROCHLORIDE 50 MG: 50 TABLET, FILM COATED ORAL at 21:57

## 2020-02-08 RX ADMIN — CEPHALEXIN 250 MG: 250 CAPSULE ORAL at 14:34

## 2020-02-08 RX ADMIN — ATORVASTATIN CALCIUM 40 MG: 40 TABLET, FILM COATED ORAL at 21:55

## 2020-02-08 RX ADMIN — TRAMADOL HYDROCHLORIDE 50 MG: 50 TABLET, FILM COATED ORAL at 15:35

## 2020-02-08 RX ADMIN — HEPARIN SODIUM 5000 UNITS: 5000 INJECTION, SOLUTION INTRAVENOUS; SUBCUTANEOUS at 14:35

## 2020-02-08 RX ADMIN — INSULIN GLARGINE 25 UNITS: 100 INJECTION, SOLUTION SUBCUTANEOUS at 11:07

## 2020-02-08 RX ADMIN — CLONIDINE HYDROCHLORIDE 0.3 MG: 0.2 TABLET ORAL at 09:35

## 2020-02-08 RX ADMIN — CLONIDINE HYDROCHLORIDE 0.3 MG: 0.2 TABLET ORAL at 14:34

## 2020-02-08 ASSESSMENT — PAIN SCALES - GENERAL
PAINLEVEL_OUTOF10: 8
PAINLEVEL_OUTOF10: 0
PAINLEVEL_OUTOF10: 9
PAINLEVEL_OUTOF10: 8
PAINLEVEL_OUTOF10: 9
PAINLEVEL_OUTOF10: 0
PAINLEVEL_OUTOF10: 8

## 2020-02-08 ASSESSMENT — PAIN DESCRIPTION - DESCRIPTORS: DESCRIPTORS: ACHING;DISCOMFORT

## 2020-02-08 ASSESSMENT — PAIN DESCRIPTION - PAIN TYPE: TYPE: ACUTE PAIN

## 2020-02-08 ASSESSMENT — PAIN DESCRIPTION - LOCATION: LOCATION: HAND

## 2020-02-08 ASSESSMENT — PAIN - FUNCTIONAL ASSESSMENT: PAIN_FUNCTIONAL_ASSESSMENT: PREVENTS OR INTERFERES SOME ACTIVE ACTIVITIES AND ADLS

## 2020-02-08 ASSESSMENT — PAIN DESCRIPTION - FREQUENCY: FREQUENCY: CONTINUOUS

## 2020-02-08 ASSESSMENT — PAIN DESCRIPTION - ORIENTATION: ORIENTATION: RIGHT

## 2020-02-08 NOTE — CONSULTS
erythema along the radial aspect of the index finger. There is diffuse swelling into the dorsum of the hand. · No definitive abscess or focal fluid collection noted. · + TTP over the dorsum of the hand diffusely no pinpoint tenderness  · No tenderness over the flexor tendon sheath. Patient is able to fully flex and extend at the DIP, PIP, CMP joint of all digits  · Sensations intact light touch in the median, ulnar, radial nerve distributions of the hand  · + AIN, PIN, ulnar motor nerve function to the hand  · Compartments are soft and compressible  · Good Capillary refill to all digits  · + 2/4 radial pulse    Secondary Exam:   · Left UE: No obvious signs of trauma. -TTP to fingers, hand, wrist, forearm, elbow, humerus, shoulder or clavicle. -- Patient able to flex/extend fingers, wrist, elbow and shoulder with active and passive ROM without pain, +2/4 Radial pulse, cap refill <3sec, +AIN/PIN/Radial/Ulnar/Median N, distal sensation grossly intact to C4-T1 dermatomes, compartments soft and compressible. · bilateralLE: No obvious signs of trauma. -TTP to foot, ankle, leg, knee, thigh, hip.-- Patient able to flex/extend toes, ankle, knee and hip with active and passive ROM without pain,+2/4 DP & PT pulses, cap refill <3sec, +5/5 PF/DF/EHL, distal sensation grossly intact to L4-S1 dermatomes, compartments soft and compressible. · Pelvis: -TTP, -Log roll, -Heel strike     DATA:    CBC:   Lab Results   Component Value Date    WBC 5.8 02/08/2020    RBC 3.00 02/08/2020    HGB 8.9 02/08/2020    HCT 28.4 02/08/2020    MCV 94.7 02/08/2020    MCH 29.7 02/08/2020    MCHC 31.3 02/08/2020    RDW 15.9 02/08/2020     02/08/2020    MPV 10.5 02/08/2020     PT/INR:    Lab Results   Component Value Date    PROTIME 10.9 02/04/2020    PROTIME 13.6 08/14/2011    INR 1.0 02/04/2020       Radiology Review:  X-ray right hand  No fractures or dislocations in the hand. No foreign body noted in the hand. IMPRESSION:  · Right hand cellulitis  · Right hand swelling  PLAN:  · Is likely that the right hand edema is related to infiltration of the IV that was placed in this area.     · Ice and elevate the hand above the heart  · Antibiotics per infectious disease  · No acute orthopedic intervention at this time we will follow for improvement on antibiotics and elevation  · Pain control per admitting  · Discussed with Dr. Jannette Modi

## 2020-02-08 NOTE — PLAN OF CARE
Problem: Falls - Risk of:  Goal: Will remain free from falls  Description  Will remain free from falls  2/8/2020 0138 by Keith Grigsby RN  Outcome: Met This Shift     Problem: Falls - Risk of:  Goal: Absence of physical injury  Description  Absence of physical injury  2/8/2020 0138 by Keith Grigsby RN  Outcome: Met This Shift     Problem: Pain:  Goal: Control of acute pain  Description  Control of acute pain  2/8/2020 0138 by Keith Grigsby RN  Outcome: Met This Shift

## 2020-02-08 NOTE — PROGRESS NOTES
of CKD. Edema.   Plan:  Pseudohyponatremia from hyperglycemia with a corrected sodium of close to 138 for a sugar of over 600  Hyperkalemia is also trans-locational with osmotic potassium shift  With improvement in blood sugar levels repeat blood work showed a normal sodium and potassium  Continue with Bumex 1 mg p.o. twice daily  She is stable for discharge from renal standpoint  Continue to monitor the renal function closely

## 2020-02-08 NOTE — CONSULTS
[address] [place] [address] [facility]  Consult Note    [address] [facility]  [facility], [address]  Phone [phone]   [phone]      Admit Date: 2020  1:35 AM  Pt Name: Katie John  MRN: [id number]  : 1990  Reason for Consult:    Chief Complaint   Patient presents with    Loss of Consciousness     Requesting Physician:  Rik Schmitz MD  PCP: Gisele Ibarra MD  History Obtained From:  patient  ID consulted for Altered mental status [R41.82]  on hospital day [facility]       Chief Complaint   Patient presents with    Loss of Consciousness     HISTORYOF  Jj Lin is a 34 y.o. female who presents with significant past medical history of  has a past medical history of BC (acute kidney injury) ([facility] [address] Cephalgia, Chronic kidney disease, Depression, Diabetes mellitus ([facility] [address] Diabetic ketoacidosis ([facility] [address] Iron deficiency anemia, MDRO (multiple drug resistant organisms) resistance, MRSA (methicillin resistant Staphylococcus aureus), Non compliance w medication regimen, Other disorders of kidney and ureter, Pregnancy, and Severe pre-eclampsia in third trimester. who presents with   Chief Complaint   Patient presents with    Loss of Consciousness     ED TRIAGEVITALS  BP: 118/64, Temp: 98.1 °F (36.7 °C), Pulse: 86, Resp: 16, SpO2: 92 %  HPI  Pt has h/o dm/ckd/htn  Came in with peripheral edema and weakness  Pt also has lowr back pain that wraps around to her back  Has soboe    Pt does make urine occ usually once a day.   Pt has burning at the end of urination  No odor no hematuria     Wbc5.8 cr3.6  urine cx Gemella morbillorum  Has right hand swelling and pain   She states it may have been a previous IV site    REVIEW OF SYSTEMS    (2-9 systems for level 4, 10 or more for level 5)     REVIEW OFSYSTEMS:    CONSTITUTIONAL:   No fever, chills, weight loss  ALLERGIES:    No urticaria, hay fever,    EYES:     No blurry vision, loss of vision,eye pain  ENT:      No hearing loss, sore throat  CARDIOVASCULAR:  No chest pain or palpitations  RESPIRATORY:   No cough, +sob  ENDOCRINE:    No increase thirst, urination   HEME-LYMPH:   No easy bruising or bleeding  GI:     No nausea, vomiting or diarrhea  :     urinary complaints  NEURO:    No seizures, stroke, HA  MUSCULOSKELETAL:  Right hand muscle aches or pain, no jointpain  SKIN:     No rash or itch  PSYCH:    No depression or anxiety    CURRENT MEDICATIONS     Current Facility-Administered Medications:     [START ON 2/9/2020] insulin glargine (LANTUS) injection vial 25 Units, 25 Units, Subcutaneous, Nightly, Keith Ronalde APRN - ASHLEY    glucose (GLUTOSE) 40 % oral gel 15 g, 15 g, Oral, PRN, Keith Loge, APRN - CNP    dextrose 50 % IV solution, 12.5 g, Intravenous, PRN, Keith Loge, APRN - CNP    glucagon (rDNA) injection 1 mg, 1 mg, Intramuscular, PRN, Keith Cardenase, APRN - CNP    dextrose 5 % solution, 100 mL/hr, Intravenous, PRN, Keith Cardenase, APRN - CNP    insulin lispro (HUMALOG) injection vial 0-18 Units, 0-18 Units, Subcutaneous, Q4H, Keith Cardenase, APRN - CNP, 18 Units at 02/08/20 1107    bumetanide (BUMEX) tablet 1 mg, 1 mg, Oral, BID, Stevo Bradshaw MD, 1 mg at 02/08/20 0935    cephALEXin (KEFLEX) capsule 250 mg, 250 mg, Oral, 3 times per day, HEBER Archuleta - CNP, 250 mg at 02/08/20 0536    epoetin nena-epbx (RETACRIT) injection 8,000 Units, 8,000 Units, Subcutaneous, Once per day on Mon Wed Fri, Aixa Tamayo MD, 8,000 Units at 02/07/20 5374    sodium chloride flush 0.9 % injection 10 mL, 10 mL, Intravenous, 2 times per day, Kary Huerta MD, 10 mL at 02/07/20 2150    sodium chloride flush 0.9 % injection 10 mL, 10 mL, Intravenous, PRN, Kary Huerta MD    magnesium hydroxide (MILK OF MAGNESIA) 400 MG/5ML suspension 30 mL, 30 mL, Oral, Daily PRN, Kary Huerta MD    ondansetron [facility]) injection 4 mg, 4 mg, Intravenous, Q6H PRN, Kary Huerta MD, 4 disease     Depression     Diabetes mellitus ([facility] [address]     Diabetic ketoacidosis ([facility] [address] 2011    Iron deficiency anemia 10/1/2019    MDRO (multiple drug resistant organisms) resistance     MRSA (methicillin resistant Staphylococcus aureus)     back wound abcess    Non compliance w medication regimen 3/30/2016    Other disorders of kidney and ureter     Pregnancy 3/30/2016    16 weeks    Severe pre-eclampsia in third trimester 2016     SURGICAL HISTORY       Past Surgical History:   Procedure Laterality Date    BACK SURGERY      abscess     SECTION      x2    CHOLECYSTECTOMY, LAPAROSCOPIC N/A 2019    CHOLECYSTECTOMY LAPAROSCOPIC performed by Whitney Phillips MD at [facility] N/A 2018    COLONOSCOPY WITH BIOPSY performed by Jenna Brody MD at [facility] N/A 2018    COLONOSCOPY WITH BIOPSY performed by Cathy Taylor MD at [address]  2012    EF 57%    ECHO COMPL W DOP COLOR FLOW  6/10/2013         NE ESOPHAGOGASTRODUODENOSCOPY TRANSORAL DIAGNOSTIC N/A 2018    EGD ESOPHAGOGASTRODUODENOSCOPY performed by Jenna Brody MD at [id number] [place] [address] TUNNELED VENOUS PORT PLACEMENT  2018    UPPER GASTROINTESTINAL ENDOSCOPY  2018    EGD BIOPSY performed by Cathy Taylor MD at [address] [place] [address] N/A 10/11/2019    EGD ESOPHAGOGASTRODUODENOSCOPY [facility] History   Problem Relation Age of Onset    Asthma Mother     Hypertension Mother     High Blood Pressure Mother     Diabetes Mother     Asthma Brother     High Blood Pressure Father      SOCIAL HISTORY       Social History     Socioeconomic History    Marital status: Single     Spouse name: None    Number of children: None    Years of education: None    Highest education level: None   Occupational History    None   Social Needs    Financial resource strain: None    Food insecurity:     Worry: None     Inability: None    Transportation needs:     Medical: None     Non-medical: None   Tobacco Use    Smoking status: Current Every Day Smoker     Packs/day: 0.50     Years: 6.00     Pack years: 3.00     Types: Cigarettes     Last attempt to quit: 2019     Years since quittin.7    Smokeless tobacco: Current User   Substance and Sexual Activity    Alcohol use: No    Drug use: No     Comment: documented prior history of opioid abuse    Sexual activity: Yes     Partners: Male   Lifestyle    Physical activity:     Days per week: None     Minutes per session: None    Stress: None   Relationships    Social connections:     Talks on phone: None     Gets together: None     Attends Gnosticist service: None     Active member of club or organization: None     Attends meetings of clubs or organizations: None     Relationship status: None    Intimate partner violence:     Fear of current or ex partner: None     Emotionally abused: None     Physically abused: None     Forced sexual activity: None   Other Topics Concern    None   Social History Narrative    None       PHYSICAL EXAM    (up to 7 for level 4, 8 or more forlevel 5)     ED Triage Vitals   BP Temp Temp Source Pulse Resp SpO2 Height Weight   20 0136 20 0848 20 0848 20 0136 20 0136 20 0136 20 1145 20 0136   (!) 200/123 98.1 °F (36.7 °C) Oral 108 17 100 % 5' 4\" (1.626 m) 160 lb (72.6 kg)     Vitals:    Vitals:    20 1900 20 0010 20 0530 20 0845   BP: (!) 144/91  (!) 151/83 118/64   Pulse: 84  77 86   Resp: 17   16   Temp: 98.5 °F (36.9 °C)   98.1 °F (36.7 °C)   TempSrc: Oral   Oral   SpO2: 97% 96%  92%   Weight:       Height:         Physical Exam   Constitutional/General: Alert and oriented, NAD  Head: NC/AT  Eyes: PERRL, EOMI glasses  Mouth: Normal mucosa, no thrush   Neck: Supple, full ROM,    Pulmonary: Lungs Xr Chest Portable    Result Date: 1/17/2020  Location:200 Exam: XR CHEST PORTABLE Comparison: 1/16/2020 History: Dyspnea Findings: Portable AP upright chest. Heart size is normal. Pulmonary vessels are nondistended. No focal pulmonary parenchymal consolidation. No acute cardiopulmonary disease. Xr Chest 1 Vw    Result Date: 1/13/2020  Location:200 Exam: XR CHEST 1 VIEW Comparison: 11/5/2019 History: Dyspnea Findings: Portable AP upright chest. Heart size is normal. Pulmonary vessels are nondistended. No focal pulmonary parenchymal consolidation. No acute cardiopulmonary disease. Ct Chest High Resolution    Result Date: 1/14/2020  LOCATION: 200 EXAM: CT CHEST HIGH RESOLUTION. COMPARISON: None. HISTORY: Dyspnea on exertion. TECHNIQUE: Noncontrast helical chest CT was performed. Additional imaging in the prone position and expiratory phase was performed. High-resolution, coronal, and sagittal reformatted images were obtained. Automated dose exposure control was used for this exam. CONTRAST: No intravenous contrast administered. FINDINGS: Prone position demonstrates bibasilar groundglass opacities worrisome for infiltrate pneumonia. Differential diagnosis includes desquamative interstitial pneumonia, acute alveolar disease, opportunistic infection, hypersensitivity pneumonitis alveolar proteinosis, hemorrhage among other causes. No pleural effusion. There is high density within the distal esophagus which may reflect food substance. This may reflect reflux of oral contrast. Has this patient had a recent imaging study with oral contrast? MediPort catheter tip projected at the cavoatrial junction. High-resolution imaging demonstrates a normal appearance to the primary/secondary pulmonary units without interstitial fibrosis. No bronchiectasis or bronchial wall thickening. 1. Bibasilar groundglass opacities without parapneumonic effusion.  Groundglass opacities are a nonspecific finding and can be the Routine   Date Value Ref Range Status   02/04/2020   Final    >100,000 CFU/ml  Organism is gram positive coccobacillary. Validated susceptibility testing methods do not exist for  this isolate. 01/15/2020   Final    ,000 CFU/mL  Mixed yulisa isolated. Further workup and sensitivity testing  is not routinely indicated and will not be performed. Mixed yulisa isolated includes:  Proteus species  Gram negative rods  Gram positive organism     11/05/2019 <10,000 CFU/mL  Gram positive organism    Final     MRSA Culture Only   Date Value Ref Range Status   10/30/2019 Methicillin resistant Staph aureus not isolated  Final   10/02/2019 Methicillin resistant Staph aureus not isolated  Final   12/14/2018 Methicillin resistant Staph aureus not isolated  Final        Patient is a 34 y.o. female who presented with   Chief Complaint   Patient presents with    Loss of Consciousness        FINAL IMPRESSION      1. Altered mental status, unspecified altered mental status type    2. Acute congestive heart failure, unspecified heart failure type ([facility] [address]    3. Hypertensive encephalopathy    4. Chronic renal impairment, unspecified CKD stage    Sinusitis by CT   Right hand infection  From IV  UTI gemella morbillorum  DM hyperglycemia    H/o cdiff    Check blood cx  Esr/crp   mrsa screen   atbx  Bladder scan  procalcitonin  vanco x1  On cephalexin      Imaging and labs were reviewed per medical records and any ID pertinent labs were addressed with the patient. The patient/FAMILY  was educated about the diagnosis, prognosis, indications, risks and benefits of treatment. An opportunity to ask questions was given to the patient/FAMILY and questions were answered. Thank you for the consult. We will follow with you.        Electronically signed by Zen Cary MD on 2/8/2020 at 11:53 AM

## 2020-02-08 NOTE — PLAN OF CARE
Problem: Falls - Risk of:  Goal: Will remain free from falls  Description  Will remain free from falls  2/7/2020 2207 by Dinora Lawrence RN  Outcome: Met This Shift     Problem: Falls - Risk of:  Goal: Absence of physical injury  Description  Absence of physical injury  2/7/2020 2207 by Dinora Lawrence RN  Outcome: Met This Shift     Problem: Pain:  Goal: Pain level will decrease  Description  Pain level will decrease  2/7/2020 2207 by Dinora Lawrence RN  Outcome: Met This Shift     Problem: Pain:  Goal: Control of acute pain  Description  Control of acute pain  2/7/2020 2207 by Dinroa Lawrence RN  Outcome: Met This Shift     Problem: Pain:  Goal: Control of chronic pain  Description  Control of chronic pain  2/7/2020 2207 by Dinora Lawrence RN  Outcome: Met This Shift

## 2020-02-08 NOTE — PROGRESS NOTES
distress  Cardiovascular: normal rate, normal S1 and S2   Abdomen: soft, non-tender, non-distended, normal bowel sounds, no masses or organomegaly  Extremities: no cyanosis, no clubbing and 3+ bilateral lower extremity edema, right hand erythema and edema  Neurologic: no cranial nerve deficit and speech normal      Recent Labs     02/07/20  0525 02/08/20  0552 02/08/20  1000    130* 129*   K 5.3* 6.5* 5.5*   CL 98 98 97*   CO2 26 22 18*   BUN 49* 59* 60*   CREATININE 3.5* 3.7* 3.6*   GLUCOSE 345* 499* 607*   CALCIUM 8.0* 7.9* 7.5*       Recent Labs     02/06/20  0520 02/07/20  0525 02/08/20  0552   WBC 5.2 5.3 5.8   RBC 2.95* 2.94* 3.00*   HGB 8.7* 8.7* 8.9*   HCT 28.5* 28.7* 28.4*   MCV 96.6 97.6 94.7   MCH 29.5 29.6 29.7   MCHC 30.5* 30.3* 31.3*   RDW 16.9* 16.8* 15.9*    146 185   MPV 10.1 11.4 10.5         Radiology:   CT Head WO Contrast   Final Result   1. No acute intracranial abnormality, without significant change in   appearance of intracranial contents since 1/22/2020.   2. Sphenoid, bilateral ethmoid, and right maxillary sinus mucosal   disease, greatest involving the right maxillary sinus            XR CHEST PORTABLE   Final Result   1. Findings most likely representing CHF versus atypical infectious   process. US DUP LOWER EXTREMITIES BILATERAL VENOUS    (Results Pending)   US DUP UPPER EXTREMITY RIGHT VENOUS    (Results Pending)       Assessment:  Active Problems:    Hypertension    Other specified diabetes mellitus with other specified complication (HCC)    Altered mental status    Acute heart failure with preserved ejection fraction (HCC)    Hypervolemia    Chronic kidney disease    Hypokalemia    Elevated troponin  Resolved Problems:    * No resolved hospital problems. *      Plan:  1. Fluid overload:  Chest x ray showed vascular congestion flash pulmonary edema. proBNP over 70,000. Echo on 1/13/20 showed an EF of 60%. Patient was on a Bumex drip which has been stopped.   She is negative 6.1 liters. Nephrology and Cardiology following. On oral Bumex. 2. Elevated troponin:  Cardiology following -likely type II due to demand ischemia due to chronic renal failure. Troponin 0.09 -> 0.10. No aggressive cardiac work up at this time. 3. Hyperkalemia:  K was 6.5 this am.  Likely falsely elevated due to hyperglycemia,  4. Chronic kidney disease:  Creatinine 3.6 today. Slightly higher than baseline. Nephrology following. 5. Diabetes:  Patient was hyperglycemic today with a blood sugar of 499 and then 607. Lantus 25 units ordered. Blood sugar has now improved to 249. Continue Insulin sliding scale, blood sugar checks, hypoglycemic protocol. 6. Hypertension:  Continue clonidine, hydralazine, cardizem, metoprolol  7. UTI:  Urine culture positive for Gemella mobillorum. ID consulted. Blood cultures ordered. 8. Bilateral lower extremity edema:  Ultrasound negative for DVT  9. Right hand edema:  Likely cellulitis. Continue oral Keflex. Orthopedic Surgery consulted. 10. Vitamin D deficiency:  Resume home Vitamin D.  11. Hyperuricemia:  Defer treatment to Nephrology as NSAIDs and colchicine cannot be used due to CKD. NOTE: This report was transcribed using voice recognition software.  Every effort was made to ensure accuracy; however, inadvertent computerized transcription errors may be present.     Electronically signed by HEBER Archuleta CNP on 2/8/2020 at 3:17 PM

## 2020-02-09 LAB
ALBUMIN SERPL-MCNC: 2.2 G/DL (ref 3.5–5.2)
ALP BLD-CCNC: 177 U/L (ref 35–104)
ALT SERPL-CCNC: 27 U/L (ref 0–32)
ANION GAP SERPL CALCULATED.3IONS-SCNC: 9 MMOL/L (ref 7–16)
AST SERPL-CCNC: 42 U/L (ref 0–31)
BASOPHILS ABSOLUTE: 0.07 E9/L (ref 0–0.2)
BASOPHILS RELATIVE PERCENT: 1.3 % (ref 0–2)
BILIRUB SERPL-MCNC: <0.2 MG/DL (ref 0–1.2)
BUN BLDV-MCNC: 66 MG/DL (ref 6–20)
CALCIUM SERPL-MCNC: 8.1 MG/DL (ref 8.6–10.2)
CHLORIDE BLD-SCNC: 100 MMOL/L (ref 98–107)
CO2: 26 MMOL/L (ref 22–29)
CREAT SERPL-MCNC: 3.9 MG/DL (ref 0.5–1)
EOSINOPHILS ABSOLUTE: 0.8 E9/L (ref 0.05–0.5)
EOSINOPHILS RELATIVE PERCENT: 14.5 % (ref 0–6)
GFR AFRICAN AMERICAN: 17
GFR NON-AFRICAN AMERICAN: 17 ML/MIN/1.73
GLUCOSE BLD-MCNC: 56 MG/DL (ref 74–99)
HCT VFR BLD CALC: 26.2 % (ref 34–48)
HEMOGLOBIN: 8.2 G/DL (ref 11.5–15.5)
IMMATURE GRANULOCYTES #: 0.01 E9/L
IMMATURE GRANULOCYTES %: 0.2 % (ref 0–5)
LYMPHOCYTES ABSOLUTE: 1.7 E9/L (ref 1.5–4)
LYMPHOCYTES RELATIVE PERCENT: 30.8 % (ref 20–42)
MAGNESIUM: 2 MG/DL (ref 1.6–2.6)
MCH RBC QN AUTO: 29.7 PG (ref 26–35)
MCHC RBC AUTO-ENTMCNC: 31.3 % (ref 32–34.5)
MCV RBC AUTO: 94.9 FL (ref 80–99.9)
METER GLUCOSE: 108 MG/DL (ref 74–99)
METER GLUCOSE: 110 MG/DL (ref 74–99)
METER GLUCOSE: 152 MG/DL (ref 74–99)
METER GLUCOSE: 155 MG/DL (ref 74–99)
METER GLUCOSE: 157 MG/DL (ref 74–99)
METER GLUCOSE: 196 MG/DL (ref 74–99)
METER GLUCOSE: 51 MG/DL (ref 74–99)
METER GLUCOSE: 70 MG/DL (ref 74–99)
METER GLUCOSE: 99 MG/DL (ref 74–99)
MONOCYTES ABSOLUTE: 0.45 E9/L (ref 0.1–0.95)
MONOCYTES RELATIVE PERCENT: 8.2 % (ref 2–12)
NEUTROPHILS ABSOLUTE: 2.49 E9/L (ref 1.8–7.3)
NEUTROPHILS RELATIVE PERCENT: 45 % (ref 43–80)
PDW BLD-RTO: 16.2 FL (ref 11.5–15)
PLATELET # BLD: 235 E9/L (ref 130–450)
PMV BLD AUTO: 10.3 FL (ref 7–12)
POTASSIUM SERPL-SCNC: 5.2 MMOL/L (ref 3.5–5)
RBC # BLD: 2.76 E12/L (ref 3.5–5.5)
SODIUM BLD-SCNC: 135 MMOL/L (ref 132–146)
TOTAL PROTEIN: 5.4 G/DL (ref 6.4–8.3)
WBC # BLD: 5.5 E9/L (ref 4.5–11.5)

## 2020-02-09 PROCEDURE — 6370000000 HC RX 637 (ALT 250 FOR IP): Performed by: NURSE PRACTITIONER

## 2020-02-09 PROCEDURE — 6360000002 HC RX W HCPCS: Performed by: INTERNAL MEDICINE

## 2020-02-09 PROCEDURE — 99233 SBSQ HOSP IP/OBS HIGH 50: CPT | Performed by: INTERNAL MEDICINE

## 2020-02-09 PROCEDURE — 83735 ASSAY OF MAGNESIUM: CPT

## 2020-02-09 PROCEDURE — 1200000000 HC SEMI PRIVATE

## 2020-02-09 PROCEDURE — 6370000000 HC RX 637 (ALT 250 FOR IP): Performed by: INTERNAL MEDICINE

## 2020-02-09 PROCEDURE — 82962 GLUCOSE BLOOD TEST: CPT

## 2020-02-09 PROCEDURE — 36415 COLL VENOUS BLD VENIPUNCTURE: CPT

## 2020-02-09 PROCEDURE — APPSS30 APP SPLIT SHARED TIME 16-30 MINUTES: Performed by: NURSE PRACTITIONER

## 2020-02-09 PROCEDURE — 85025 COMPLETE CBC W/AUTO DIFF WBC: CPT

## 2020-02-09 PROCEDURE — 80053 COMPREHEN METABOLIC PANEL: CPT

## 2020-02-09 PROCEDURE — 87040 BLOOD CULTURE FOR BACTERIA: CPT

## 2020-02-09 PROCEDURE — 6370000000 HC RX 637 (ALT 250 FOR IP): Performed by: SPECIALIST

## 2020-02-09 PROCEDURE — 2700000000 HC OXYGEN THERAPY PER DAY

## 2020-02-09 PROCEDURE — 2580000003 HC RX 258: Performed by: INTERNAL MEDICINE

## 2020-02-09 PROCEDURE — 2500000003 HC RX 250 WO HCPCS: Performed by: INTERNAL MEDICINE

## 2020-02-09 RX ORDER — CIPROFLOXACIN 500 MG/1
500 TABLET, FILM COATED ORAL ONCE
Status: COMPLETED | OUTPATIENT
Start: 2020-02-09 | End: 2020-02-09

## 2020-02-09 RX ORDER — BUMETANIDE 0.25 MG/ML
2 INJECTION, SOLUTION INTRAMUSCULAR; INTRAVENOUS 2 TIMES DAILY
Status: DISCONTINUED | OUTPATIENT
Start: 2020-02-09 | End: 2020-02-15 | Stop reason: HOSPADM

## 2020-02-09 RX ORDER — CIPROFLOXACIN 250 MG/1
250 TABLET, FILM COATED ORAL
Status: DISCONTINUED | OUTPATIENT
Start: 2020-02-10 | End: 2020-02-15

## 2020-02-09 RX ADMIN — HYDRALAZINE HYDROCHLORIDE 100 MG: 25 TABLET, FILM COATED ORAL at 05:31

## 2020-02-09 RX ADMIN — TRAMADOL HYDROCHLORIDE 50 MG: 50 TABLET, FILM COATED ORAL at 18:05

## 2020-02-09 RX ADMIN — CEPHALEXIN 250 MG: 250 CAPSULE ORAL at 14:21

## 2020-02-09 RX ADMIN — TRAMADOL HYDROCHLORIDE 50 MG: 50 TABLET, FILM COATED ORAL at 09:15

## 2020-02-09 RX ADMIN — CEPHALEXIN 250 MG: 250 CAPSULE ORAL at 05:32

## 2020-02-09 RX ADMIN — ASPIRIN 81 MG 81 MG: 81 TABLET ORAL at 09:15

## 2020-02-09 RX ADMIN — HYDRALAZINE HYDROCHLORIDE 100 MG: 25 TABLET, FILM COATED ORAL at 22:37

## 2020-02-09 RX ADMIN — ATORVASTATIN CALCIUM 40 MG: 40 TABLET, FILM COATED ORAL at 20:36

## 2020-02-09 RX ADMIN — INSULIN LISPRO 3 UNITS: 100 INJECTION, SOLUTION INTRAVENOUS; SUBCUTANEOUS at 01:07

## 2020-02-09 RX ADMIN — CLONIDINE HYDROCHLORIDE 0.3 MG: 0.2 TABLET ORAL at 14:21

## 2020-02-09 RX ADMIN — Medication 10 ML: at 20:36

## 2020-02-09 RX ADMIN — METOPROLOL TARTRATE 100 MG: 25 TABLET ORAL at 20:37

## 2020-02-09 RX ADMIN — HYDRALAZINE HYDROCHLORIDE 100 MG: 25 TABLET, FILM COATED ORAL at 14:21

## 2020-02-09 RX ADMIN — HEPARIN SODIUM 5000 UNITS: 5000 INJECTION, SOLUTION INTRAVENOUS; SUBCUTANEOUS at 22:37

## 2020-02-09 RX ADMIN — CLONIDINE HYDROCHLORIDE 0.3 MG: 0.2 TABLET ORAL at 09:15

## 2020-02-09 RX ADMIN — INSULIN LISPRO 3 UNITS: 100 INJECTION, SOLUTION INTRAVENOUS; SUBCUTANEOUS at 11:58

## 2020-02-09 RX ADMIN — INSULIN GLARGINE 25 UNITS: 100 INJECTION, SOLUTION SUBCUTANEOUS at 20:35

## 2020-02-09 RX ADMIN — AMITRIPTYLINE HYDROCHLORIDE 10 MG: 10 TABLET, FILM COATED ORAL at 20:36

## 2020-02-09 RX ADMIN — CIPROFLOXACIN 500 MG: 500 TABLET, FILM COATED ORAL at 14:59

## 2020-02-09 RX ADMIN — INSULIN LISPRO 3 UNITS: 100 INJECTION, SOLUTION INTRAVENOUS; SUBCUTANEOUS at 15:59

## 2020-02-09 RX ADMIN — BUMETANIDE 2 MG: 0.25 INJECTION INTRAMUSCULAR; INTRAVENOUS at 12:26

## 2020-02-09 RX ADMIN — CLONIDINE HYDROCHLORIDE 0.3 MG: 0.2 TABLET ORAL at 20:37

## 2020-02-09 RX ADMIN — Medication 10 ML: at 09:16

## 2020-02-09 RX ADMIN — DILTIAZEM HYDROCHLORIDE 240 MG: 240 CAPSULE, COATED, EXTENDED RELEASE ORAL at 09:15

## 2020-02-09 RX ADMIN — METOPROLOL TARTRATE 100 MG: 25 TABLET ORAL at 09:15

## 2020-02-09 RX ADMIN — HEPARIN SODIUM 5000 UNITS: 5000 INJECTION, SOLUTION INTRAVENOUS; SUBCUTANEOUS at 14:21

## 2020-02-09 RX ADMIN — HEPARIN SODIUM 5000 UNITS: 5000 INJECTION, SOLUTION INTRAVENOUS; SUBCUTANEOUS at 05:32

## 2020-02-09 RX ADMIN — INSULIN LISPRO 3 UNITS: 100 INJECTION, SOLUTION INTRAVENOUS; SUBCUTANEOUS at 22:37

## 2020-02-09 RX ADMIN — TRAMADOL HYDROCHLORIDE 50 MG: 50 TABLET, FILM COATED ORAL at 03:37

## 2020-02-09 RX ADMIN — BUMETANIDE 2 MG: 0.25 INJECTION INTRAMUSCULAR; INTRAVENOUS at 20:36

## 2020-02-09 RX ADMIN — BUMETANIDE 1 MG: 1 TABLET ORAL at 09:15

## 2020-02-09 ASSESSMENT — PAIN DESCRIPTION - PAIN TYPE
TYPE: ACUTE PAIN
TYPE: ACUTE PAIN

## 2020-02-09 ASSESSMENT — PAIN DESCRIPTION - DESCRIPTORS
DESCRIPTORS: ACHING;DISCOMFORT
DESCRIPTORS: ACHING;DISCOMFORT

## 2020-02-09 ASSESSMENT — PAIN DESCRIPTION - ONSET
ONSET: ON-GOING
ONSET: ON-GOING

## 2020-02-09 ASSESSMENT — PAIN SCALES - GENERAL
PAINLEVEL_OUTOF10: 7
PAINLEVEL_OUTOF10: 0
PAINLEVEL_OUTOF10: 6
PAINLEVEL_OUTOF10: 8
PAINLEVEL_OUTOF10: 6
PAINLEVEL_OUTOF10: 7

## 2020-02-09 ASSESSMENT — PAIN DESCRIPTION - ORIENTATION
ORIENTATION: RIGHT
ORIENTATION: RIGHT

## 2020-02-09 ASSESSMENT — PAIN DESCRIPTION - FREQUENCY
FREQUENCY: CONTINUOUS
FREQUENCY: CONTINUOUS

## 2020-02-09 ASSESSMENT — PAIN DESCRIPTION - LOCATION
LOCATION: HAND
LOCATION: HAND;ARM

## 2020-02-09 ASSESSMENT — PAIN - FUNCTIONAL ASSESSMENT: PAIN_FUNCTIONAL_ASSESSMENT: ACTIVITIES ARE NOT PREVENTED

## 2020-02-09 NOTE — PROGRESS NOTES
[address] [place] [address] [facility]  [facility]  Progress Note    NAME:Wilton Flores  1990  DATE:20    F/U: uti/right hand    SUBJECTIVE:  Chief Complaint   Patient presents with    Loss of Consciousness     Pt still has pain right hand had d/c  Tender  Family present  No FEVERS, CHILLS, nausea, vomiting, diarrhea. Patient is tolerating medications. No reported adverse drug reactions. Review of systems:  As stated above in the chief complaint, otherwise negative. Medications:  Scheduled Meds:   bumetanide  2 mg Intravenous BID    insulin glargine  25 Units Subcutaneous Nightly    insulin lispro  0-18 Units Subcutaneous Q4H    vitamin D  50,000 Units Oral Weekly    cephALEXin  250 mg Oral 3 times per day    epoetin nena-epbx  8,000 Units Subcutaneous Once per day on     sodium chloride flush  10 mL Intravenous 2 times per day    heparin (porcine)  5,000 Units Subcutaneous 3 times per day    amitriptyline  10 mg Oral Nightly    aspirin  81 mg Oral Daily    atorvastatin  40 mg Oral Nightly    cloNIDine  0.3 mg Oral TID    diltiazem  240 mg Oral Daily    hydrALAZINE  100 mg Oral 3 times per day    metoprolol  100 mg Oral BID     Continuous Infusions:   dextrose       PRN Meds:glucose, dextrose, glucagon (rDNA), dextrose, sodium chloride flush, magnesium hydroxide, ondansetron, acetaminophen, hydrALAZINE, labetalol, traMADol    OBJECTIVE:  /74   Pulse 76   Temp 97.6 °F (36.4 °C) (Oral)   Resp 18   Ht 5' 4\" (1.626 m)   Wt 173 lb 1.6 oz (78.5 kg)   LMP 2019 (LMP Unknown)   SpO2 96%   BMI 29.71 kg/m²   Temp  Av.9 °F (36.6 °C)  Min: 97.6 °F (36.4 °C)  Max: 98.2 °F (36.8 °C)  Constitutional:  The patient is awake, alert, and oriented. Skin:    Warm and dry. No rashes were noted. HEENT:   Round and reactive pupils. AT/NC  Neck:    Supple to movements. Chest:   No use of accessory muscles to breathe. Symmetrical expansion.    Cardiovascular: Body Fluid Culture, Sterile   Date Value Ref Range Status   11/01/2019 Growth not present  Final     MRSA Culture Only   Date Value Ref Range Status   10/30/2019 Methicillin resistant Staph aureus not isolated  Final     No results found for: CULTRESP  Recent Labs     02/08/20  1118   ORG Gram negative coral*     Recent Labs     02/08/20  1118   ORG Gram negative coral*     Recent Labs     02/08/20  1118   ORG Gram negative coral*     Recent Labs     02/08/20  1118   LABURIN >100,000 CFU/ml  Identification and sensitivity to follow     ORG Gram negative coral*          ASSESSMENT/PLAN:  GPC clusters bacteremia  Right hand cellulitis abscess  Gn bacteruria  ckd    -vanco level  cipro  Stop cephalexin  F/u blood cx  Wound care    · Monitor labs    Imaging and labs were reviewed per medical records. The patient was educated about the diagnosis, prognosis, indications, risks and benefits of treatment. An opportunity to ask questions was given to the patient/FAMILY.       Electronically signed by Ilya Rios MD on 2/9/2020 at 2:35 PM

## 2020-02-09 NOTE — PROGRESS NOTES
Department of Orthopedic Surgery  Resident Progress Note    Patient seen and examined. Pain controlled. No new complaints. Denies chest pain, shortness of breath, dizziness/lightheadedness. Has noticed some improvement in her hand redness and swelling since starting IV antibiotics     VITALS:  /76   Pulse 76   Temp 97.6 °F (36.4 °C) (Oral)   Resp 18   Ht 5' 4\" (1.626 m)   Wt 173 lb 1.6 oz (78.5 kg)   LMP 11/16/2019 (LMP Unknown)   SpO2 96%   BMI 29.71 kg/m²     General: alert and oriented to person, place and time, well-developed and well-nourished, in no acute distress    MUSCULOSKELETAL:   right upper extremity:  · Erythema is much improved and only small amount left on the index finger at the MCP joint. Edema decreased on the dorsum of the hand  · Some mild tenderness over the dorsum of the hand. · Compartments soft and compressible  · +AIN/PIN/Ulnar/Median/Radial nerve function intact grossly  · +2/4 Radial pulse, Cap refill <2 sec  · Sensation intact to touch in radial/ulnar/median nerve distributions to hand    CBC:   Lab Results   Component Value Date    WBC 5.5 02/09/2020    HGB 8.2 02/09/2020    HCT 26.2 02/09/2020     02/09/2020     PT/INR:    Lab Results   Component Value Date    PROTIME 10.9 02/04/2020    PROTIME 13.6 08/14/2011    INR 1.0 02/04/2020       ASSESSMENT  · Right hand cellulitis    PLAN      · Continue physical therapy and protocol: WBAT - RUE  · ABX per ID  · Deep venous thrombosis prophylaxis - per admitting, early mobilization  · PT/OT  · Pain Control: IV and PO  · Monitor H&H  · Patient is improving on ABX will continue to monitor at this time not orthopedic intervention necessary.   · Discussed with Dr. Latonya Vasquez

## 2020-02-10 LAB
ALBUMIN SERPL-MCNC: 2.1 G/DL (ref 3.5–5.2)
ALP BLD-CCNC: 159 U/L (ref 35–104)
ALT SERPL-CCNC: 26 U/L (ref 0–32)
ANION GAP SERPL CALCULATED.3IONS-SCNC: 9 MMOL/L (ref 7–16)
AST SERPL-CCNC: 34 U/L (ref 0–31)
BASOPHILS ABSOLUTE: 0.07 E9/L (ref 0–0.2)
BASOPHILS RELATIVE PERCENT: 1.1 % (ref 0–2)
BILIRUB SERPL-MCNC: <0.2 MG/DL (ref 0–1.2)
BLOOD CULTURE, ROUTINE: ABNORMAL
BUN BLDV-MCNC: 64 MG/DL (ref 6–20)
CALCIUM SERPL-MCNC: 8 MG/DL (ref 8.6–10.2)
CHLORIDE BLD-SCNC: 98 MMOL/L (ref 98–107)
CO2: 25 MMOL/L (ref 22–29)
CREAT SERPL-MCNC: 3.9 MG/DL (ref 0.5–1)
EOSINOPHILS ABSOLUTE: 0.75 E9/L (ref 0.05–0.5)
EOSINOPHILS RELATIVE PERCENT: 11.4 % (ref 0–6)
GFR AFRICAN AMERICAN: 17
GFR NON-AFRICAN AMERICAN: 17 ML/MIN/1.73
GLUCOSE BLD-MCNC: 244 MG/DL (ref 74–99)
HCT VFR BLD CALC: 27.9 % (ref 34–48)
HEMOGLOBIN: 8.8 G/DL (ref 11.5–15.5)
IMMATURE GRANULOCYTES #: 0.04 E9/L
IMMATURE GRANULOCYTES %: 0.6 % (ref 0–5)
LYMPHOCYTES ABSOLUTE: 1.23 E9/L (ref 1.5–4)
LYMPHOCYTES RELATIVE PERCENT: 18.8 % (ref 20–42)
MAGNESIUM: 1.9 MG/DL (ref 1.6–2.6)
MCH RBC QN AUTO: 29.4 PG (ref 26–35)
MCHC RBC AUTO-ENTMCNC: 31.5 % (ref 32–34.5)
MCV RBC AUTO: 93.3 FL (ref 80–99.9)
METER GLUCOSE: 108 MG/DL (ref 74–99)
METER GLUCOSE: 111 MG/DL (ref 74–99)
METER GLUCOSE: 119 MG/DL (ref 74–99)
METER GLUCOSE: 137 MG/DL (ref 74–99)
METER GLUCOSE: 145 MG/DL (ref 74–99)
METER GLUCOSE: 146 MG/DL (ref 74–99)
METER GLUCOSE: 163 MG/DL (ref 74–99)
METER GLUCOSE: 207 MG/DL (ref 74–99)
METER GLUCOSE: 61 MG/DL (ref 74–99)
METER GLUCOSE: 71 MG/DL (ref 74–99)
METER GLUCOSE: 78 MG/DL (ref 74–99)
METER GLUCOSE: <40 MG/DL (ref 74–99)
METER GLUCOSE: <40 MG/DL (ref 74–99)
METER GLUCOSE: >500 MG/DL (ref 74–99)
MONOCYTES ABSOLUTE: 0.4 E9/L (ref 0.1–0.95)
MONOCYTES RELATIVE PERCENT: 6.1 % (ref 2–12)
NEUTROPHILS ABSOLUTE: 4.07 E9/L (ref 1.8–7.3)
NEUTROPHILS RELATIVE PERCENT: 62 % (ref 43–80)
ORGANISM: ABNORMAL
PDW BLD-RTO: 15.7 FL (ref 11.5–15)
PLATELET # BLD: 282 E9/L (ref 130–450)
PMV BLD AUTO: 10.3 FL (ref 7–12)
POTASSIUM SERPL-SCNC: 4.9 MMOL/L (ref 3.5–5)
RBC # BLD: 2.99 E12/L (ref 3.5–5.5)
SODIUM BLD-SCNC: 132 MMOL/L (ref 132–146)
TOTAL PROTEIN: 5.4 G/DL (ref 6.4–8.3)
URINE CULTURE, ROUTINE: ABNORMAL
VANCOMYCIN RANDOM: 12.8 MCG/ML (ref 5–40)
WBC # BLD: 6.6 E9/L (ref 4.5–11.5)

## 2020-02-10 PROCEDURE — 6360000002 HC RX W HCPCS: Performed by: INTERNAL MEDICINE

## 2020-02-10 PROCEDURE — 2580000003 HC RX 258: Performed by: INTERNAL MEDICINE

## 2020-02-10 PROCEDURE — 80202 ASSAY OF VANCOMYCIN: CPT

## 2020-02-10 PROCEDURE — 83735 ASSAY OF MAGNESIUM: CPT

## 2020-02-10 PROCEDURE — 6370000000 HC RX 637 (ALT 250 FOR IP): Performed by: NURSE PRACTITIONER

## 2020-02-10 PROCEDURE — 1200000000 HC SEMI PRIVATE

## 2020-02-10 PROCEDURE — 6370000000 HC RX 637 (ALT 250 FOR IP): Performed by: SPECIALIST

## 2020-02-10 PROCEDURE — 99233 SBSQ HOSP IP/OBS HIGH 50: CPT | Performed by: INTERNAL MEDICINE

## 2020-02-10 PROCEDURE — 6370000000 HC RX 637 (ALT 250 FOR IP): Performed by: INTERNAL MEDICINE

## 2020-02-10 PROCEDURE — 2500000003 HC RX 250 WO HCPCS: Performed by: INTERNAL MEDICINE

## 2020-02-10 PROCEDURE — 6360000002 HC RX W HCPCS: Performed by: SPECIALIST

## 2020-02-10 PROCEDURE — APPSS30 APP SPLIT SHARED TIME 16-30 MINUTES: Performed by: NURSE PRACTITIONER

## 2020-02-10 PROCEDURE — P9047 ALBUMIN (HUMAN), 25%, 50ML: HCPCS | Performed by: INTERNAL MEDICINE

## 2020-02-10 PROCEDURE — 97116 GAIT TRAINING THERAPY: CPT

## 2020-02-10 PROCEDURE — 82962 GLUCOSE BLOOD TEST: CPT

## 2020-02-10 PROCEDURE — 36415 COLL VENOUS BLD VENIPUNCTURE: CPT

## 2020-02-10 PROCEDURE — 2700000000 HC OXYGEN THERAPY PER DAY

## 2020-02-10 PROCEDURE — 99231 SBSQ HOSP IP/OBS SF/LOW 25: CPT | Performed by: INTERNAL MEDICINE

## 2020-02-10 PROCEDURE — APPSS60 APP SPLIT SHARED TIME 46-60 MINUTES: Performed by: NURSE PRACTITIONER

## 2020-02-10 PROCEDURE — 80053 COMPREHEN METABOLIC PANEL: CPT

## 2020-02-10 PROCEDURE — 85025 COMPLETE CBC W/AUTO DIFF WBC: CPT

## 2020-02-10 PROCEDURE — 2580000003 HC RX 258: Performed by: SPECIALIST

## 2020-02-10 PROCEDURE — 2580000003 HC RX 258: Performed by: NURSE PRACTITIONER

## 2020-02-10 RX ORDER — ALBUMIN (HUMAN) 12.5 G/50ML
25 SOLUTION INTRAVENOUS ONCE
Status: COMPLETED | OUTPATIENT
Start: 2020-02-10 | End: 2020-02-10

## 2020-02-10 RX ORDER — INSULIN GLARGINE 100 [IU]/ML
20 INJECTION, SOLUTION SUBCUTANEOUS NIGHTLY
Status: DISCONTINUED | OUTPATIENT
Start: 2020-02-10 | End: 2020-02-11

## 2020-02-10 RX ORDER — CIPROFLOXACIN 250 MG/1
250 TABLET, FILM COATED ORAL
Qty: 5 TABLET | Refills: 0 | Status: SHIPPED | OUTPATIENT
Start: 2020-02-11 | End: 2020-02-16

## 2020-02-10 RX ORDER — DOXYCYCLINE HYCLATE 100 MG/1
100 CAPSULE ORAL 2 TIMES DAILY
Qty: 20 CAPSULE | Refills: 0 | Status: SHIPPED | OUTPATIENT
Start: 2020-02-10 | End: 2020-02-20

## 2020-02-10 RX ORDER — DEXTROSE MONOHYDRATE 100 MG/ML
INJECTION, SOLUTION INTRAVENOUS CONTINUOUS
Status: DISCONTINUED | OUTPATIENT
Start: 2020-02-10 | End: 2020-02-10

## 2020-02-10 RX ORDER — LORAZEPAM 0.5 MG/1
0.5 TABLET ORAL 2 TIMES DAILY PRN
Status: DISCONTINUED | OUTPATIENT
Start: 2020-02-10 | End: 2020-02-15 | Stop reason: HOSPADM

## 2020-02-10 RX ADMIN — TRAMADOL HYDROCHLORIDE 50 MG: 50 TABLET, FILM COATED ORAL at 08:41

## 2020-02-10 RX ADMIN — HYDRALAZINE HYDROCHLORIDE 100 MG: 25 TABLET, FILM COATED ORAL at 05:55

## 2020-02-10 RX ADMIN — ASPIRIN 81 MG 81 MG: 81 TABLET ORAL at 08:40

## 2020-02-10 RX ADMIN — TRAMADOL HYDROCHLORIDE 50 MG: 50 TABLET, FILM COATED ORAL at 16:11

## 2020-02-10 RX ADMIN — INSULIN LISPRO 3 UNITS: 100 INJECTION, SOLUTION INTRAVENOUS; SUBCUTANEOUS at 12:33

## 2020-02-10 RX ADMIN — HYDRALAZINE HYDROCHLORIDE 100 MG: 25 TABLET, FILM COATED ORAL at 22:19

## 2020-02-10 RX ADMIN — INSULIN LISPRO 3 UNITS: 100 INJECTION, SOLUTION INTRAVENOUS; SUBCUTANEOUS at 22:17

## 2020-02-10 RX ADMIN — MUPIROCIN: 20 OINTMENT TOPICAL at 21:32

## 2020-02-10 RX ADMIN — AMITRIPTYLINE HYDROCHLORIDE 10 MG: 10 TABLET, FILM COATED ORAL at 21:32

## 2020-02-10 RX ADMIN — HYDRALAZINE HYDROCHLORIDE 100 MG: 25 TABLET, FILM COATED ORAL at 15:05

## 2020-02-10 RX ADMIN — METOPROLOL TARTRATE 100 MG: 25 TABLET ORAL at 08:42

## 2020-02-10 RX ADMIN — METOPROLOL TARTRATE 100 MG: 25 TABLET ORAL at 21:31

## 2020-02-10 RX ADMIN — TRAMADOL HYDROCHLORIDE 50 MG: 50 TABLET, FILM COATED ORAL at 22:19

## 2020-02-10 RX ADMIN — DEXTROSE MONOHYDRATE: 100 INJECTION, SOLUTION INTRAVENOUS at 03:17

## 2020-02-10 RX ADMIN — INSULIN LISPRO 6 UNITS: 100 INJECTION, SOLUTION INTRAVENOUS; SUBCUTANEOUS at 17:54

## 2020-02-10 RX ADMIN — LORAZEPAM 0.5 MG: 0.5 TABLET ORAL at 12:32

## 2020-02-10 RX ADMIN — HEPARIN SODIUM 5000 UNITS: 5000 INJECTION, SOLUTION INTRAVENOUS; SUBCUTANEOUS at 15:03

## 2020-02-10 RX ADMIN — BUMETANIDE 2 MG: 0.25 INJECTION INTRAMUSCULAR; INTRAVENOUS at 21:32

## 2020-02-10 RX ADMIN — Medication 30 G: at 01:44

## 2020-02-10 RX ADMIN — DEXTROSE MONOHYDRATE 100 ML/HR: 50 INJECTION, SOLUTION INTRAVENOUS at 02:16

## 2020-02-10 RX ADMIN — ATORVASTATIN CALCIUM 40 MG: 40 TABLET, FILM COATED ORAL at 21:32

## 2020-02-10 RX ADMIN — CLONIDINE HYDROCHLORIDE 0.3 MG: 0.2 TABLET ORAL at 15:04

## 2020-02-10 RX ADMIN — BUMETANIDE 2 MG: 0.25 INJECTION INTRAMUSCULAR; INTRAVENOUS at 08:41

## 2020-02-10 RX ADMIN — INSULIN LISPRO 3 UNITS: 100 INJECTION, SOLUTION INTRAVENOUS; SUBCUTANEOUS at 08:41

## 2020-02-10 RX ADMIN — DEXTROSE 50 % IN WATER (D50W) INTRAVENOUS SYRINGE 12.5 G: at 02:00

## 2020-02-10 RX ADMIN — Medication 10 ML: at 21:34

## 2020-02-10 RX ADMIN — CLONIDINE HYDROCHLORIDE 0.3 MG: 0.2 TABLET ORAL at 08:40

## 2020-02-10 RX ADMIN — ALBUMIN (HUMAN) 25 G: 0.25 INJECTION, SOLUTION INTRAVENOUS at 12:36

## 2020-02-10 RX ADMIN — MUPIROCIN: 20 OINTMENT TOPICAL at 15:07

## 2020-02-10 RX ADMIN — VANCOMYCIN HYDROCHLORIDE 1000 MG: 1 INJECTION, POWDER, LYOPHILIZED, FOR SOLUTION INTRAVENOUS at 15:58

## 2020-02-10 RX ADMIN — HEPARIN SODIUM 5000 UNITS: 5000 INJECTION, SOLUTION INTRAVENOUS; SUBCUTANEOUS at 22:17

## 2020-02-10 RX ADMIN — DILTIAZEM HYDROCHLORIDE 240 MG: 240 CAPSULE, COATED, EXTENDED RELEASE ORAL at 08:40

## 2020-02-10 RX ADMIN — HEPARIN SODIUM 5000 UNITS: 5000 INJECTION, SOLUTION INTRAVENOUS; SUBCUTANEOUS at 05:55

## 2020-02-10 RX ADMIN — Medication 10 ML: at 09:00

## 2020-02-10 RX ADMIN — EPOETIN ALFA-EPBX 8000 UNITS: 4000 INJECTION, SOLUTION INTRAVENOUS; SUBCUTANEOUS at 12:32

## 2020-02-10 RX ADMIN — SODIUM CHLORIDE, PRESERVATIVE FREE 10 ML: 5 INJECTION INTRAVENOUS at 21:33

## 2020-02-10 RX ADMIN — CLONIDINE HYDROCHLORIDE 0.3 MG: 0.2 TABLET ORAL at 21:32

## 2020-02-10 RX ADMIN — CIPROFLOXACIN HYDROCHLORIDE 250 MG: 250 TABLET, FILM COATED ORAL at 08:45

## 2020-02-10 ASSESSMENT — PAIN SCALES - GENERAL
PAINLEVEL_OUTOF10: 7
PAINLEVEL_OUTOF10: 8
PAINLEVEL_OUTOF10: 9
PAINLEVEL_OUTOF10: 6
PAINLEVEL_OUTOF10: 5

## 2020-02-10 ASSESSMENT — PAIN - FUNCTIONAL ASSESSMENT: PAIN_FUNCTIONAL_ASSESSMENT: ACTIVITIES ARE NOT PREVENTED

## 2020-02-10 ASSESSMENT — PAIN DESCRIPTION - LOCATION: LOCATION: GENERALIZED

## 2020-02-10 ASSESSMENT — PAIN DESCRIPTION - PAIN TYPE: TYPE: ACUTE PAIN

## 2020-02-10 NOTE — PROGRESS NOTES
Chest:   No use of accessory muscles to breathe. Symmetrical expansion. Cardiovascular:  S1 and S2 are rhythmic and regular. No murmurs appreciated. Abdomen:   Positive bowel sounds to auscultation. Benign to palpation. Extremities:   No clubbing, no cyanosis, no edema.  right hand swelling better thenar no eruthema  CNS    AAxO   Lines: piv  Reyes yellow    Radiology:  Laboratory and Tests Review:  Lab Results   Component Value Date    WBC 6.6 02/10/2020    WBC 5.5 02/09/2020    WBC 5.8 02/08/2020    HGB 8.8 (L) 02/10/2020    HCT 27.9 (L) 02/10/2020    MCV 93.3 02/10/2020     02/10/2020     No results found for: CRPHS  Lab Results   Component Value Date    ALT 26 02/10/2020    AST 34 (H) 02/10/2020    ALKPHOS 159 (H) 02/10/2020    BILITOT <0.2 02/10/2020     Lab Results   Component Value Date     02/10/2020    K 4.9 02/10/2020    K 4.4 01/23/2020    CL 98 02/10/2020    CO2 25 02/10/2020    BUN 64 02/10/2020    CREATININE 3.9 02/10/2020    CREATININE 3.9 02/09/2020    CREATININE 3.7 02/08/2020    GFRAA 17 02/10/2020    LABGLOM 17 02/10/2020    GLUCOSE 244 02/10/2020    GLUCOSE 130 05/18/2012    PROT 5.4 02/10/2020    LABALBU 2.1 02/10/2020    LABALBU 4.1 05/18/2012    CALCIUM 8.0 02/10/2020    BILITOT <0.2 02/10/2020    ALKPHOS 159 02/10/2020    AST 34 02/10/2020    ALT 26 02/10/2020     Lab Results   Component Value Date    CRP 43.1 (H) 11/01/2019    CRP 0.3 10/02/2019    CRP 0.4 09/28/2017     Lab Results   Component Value Date    SEDRATE 135 (H) 11/01/2019    SEDRATE 14 09/28/2017       Microbiology:   Lab Results   Component Value Date    BLOODCULT2 24 Hours- no growth 02/08/2020    BLOODCULT2 5 Days- no growth 10/30/2019    BLOODCULT2 5 Days- no growth 10/02/2019    ORG MRSA nasal colonization 02/08/2020    ORG Coagulase Negative Staphylococci 02/08/2020    ORG Klebsiella pneumoniae ssp pneumoniae 02/08/2020     WOUND/ABSCESS   Date Value Ref Range Status   12/11/2014 (A)  Final    Mixed

## 2020-02-10 NOTE — PROGRESS NOTES
[facility] Quality Flow/Interdisciplinary Rounds Progress Note        Quality Flow Rounds held on February 10, 2020    Disciplines Attending:  Bedside Nurse, ,  and Nursing Unit Omar Patricia was admitted on 2/4/2020  1:35 AM    Anticipated Discharge Date:  Expected Discharge Date: 02/08/20    Disposition:    Benedicto Score:  Benedicto Scale Score: 21    Readmission Risk              Risk of Unplanned Readmission:        64           Discussed patient goal for the day, patient clinical progression, and barriers to discharge.   The following Goal(s) of the Day/Commitment(s) have been identified:  Activity progression, adis Hopson, possible HD in Future      Sudhir Jean Baptiste  February 10, 2020

## 2020-02-10 NOTE — CONSULTS
I have met with Wilton and she has agreed to follow as an outpatient with the clinic. I provided the pt with the Heart Failure educational material which included:      *The Heart Failure book- \"Caring for Your Heart: Living Well with Heart Failure\"      *The Heart Failure Zones       *The Sodium Content pamphlet      *\"What Foods Should You Avoid? \" information sheet. *Fast Food Sodium information sheet  We reviewed the introduction to Heart Failure, the HF zones, signs and symptoms to report on day 1 of onset, medications, medication compliance, the importance of obtaining daily weights, following a low sodium diet, reading food labels for the sodium content, keeping physician appointments, and smoking cessation. We discussed writing / tracking her weights on a calendar / paper because 5# in 1 week can sneak up if you are not tracking it. She can also take her charted weights to her doctor appointments to be reviewed. Her contributing risk factors for Heart Failure are identified as hypertension, diabetes, and tobacco abuse. I advised patient she can reduce the risk for Heart Failure exacerbations by modifying / controlling the risk factors she has. We discussed self-managed care which includes the following:  to take her medications as prescribed, if she experiences any intolerable side effects to any medications to please call her cardiologist / doctor, do not just stop taking the medication; follow a cardiac heart healthy / low sodium diet; weigh herself daily, exercise regularly- per doctor recommendation and not to smoke or use an excess amount of alcohol. We discussed calling her cardiologist / doctor with a weight gain of 3# in one day or a total of 5# or more in one week. Also, if she would have a significant weight loss of 3# or more in one day to call her doctor, she may have to decrease or hold her diuretic dose.      On days she feels nauseated and not eating / drinking, having emesis

## 2020-02-10 NOTE — PROGRESS NOTES
CT head 2/04/2020: No acute intracranial abnormality, without significant change in appearance of intracranial contents since 1/22/2020. Sphenoid, bilateral ethmoid, and right maxillary sinus mucosal disease, greatest involving the right maxillary sinus    CXR 2/04/2020: Findings most likely representing CHF versus atypical infectious process. 2/8/2020 BLE Dopplers: No evidence of bilateral lower extremity deep venous  thrombus from common femoral vein to proximal calf. 2/8/2020 RUE Doppler: No evidence of acute venous thrombosis right upper extremity. Telemetry: SR    ASSESSMENT:   1. Acute HFpEF    2. Elevated troponin, chronically elevated. Likely type II due to demand ischemia due to substance abuse, BC on CKD and acute diastolic congestive heart failure. 3. Hypertension   4. BC on CKD diuresis as per renal  Placed on IV Bumex 2/9/2020  5. Chronic anemia Hgb 8.6 on 1/23/2020-->8.8  6. T2DM insulin requiring HgbA1x 8.8 on 1/23/2020  7. UTI + Klebsiella. Afebrile  8. R hand cellulitis with + BC x 1. ID following. 9. Nasal swab + MRSA  10. Altered mental status : resolved   11. Hypoalbuminemia/Malnutrition Albumin 2.1      PLAN:  1. Continue current antihypertensives  2. Volume management per nephrology. ? Dialysis  3. No additional cardiac testing planned at this time  4. Cardiology will sign off, please call if needed    Will discuss with Dr Mandi Albert  Electronically signed by Khoa Piper. TOMÁS Garcia on 2/10/2020 at 10:56 AM         CARDIOLOGY ATTENDING ATTESTATION: I have personally interviewed the patient and obtained key portions of the history and physical necessary for medical decision making. I have also performed my own review of systems and physical exam. All labs and diagnostic testing results have been reviewed personally. My personal assessment and plan as noted below. All orders and medical decision making was done by me. REVIEW OF SYSTEMS:     · Constitutional: +fatigue.  Denies fevers, chills, night sweats  · HEENT: Denies headaches, nose bleeds, and blurred vision  · Musculoskeletal: Denies falls, pain to BLE with ambulation and edema to BLE. · Neurological: Denies dizziness and lightheadedness, numbness and tingling  · Cardiovascular: Denies chest pain, palpitations, and feelings of heart racing. · Respiratory: +dyspnea on exertion. Denies orthopnea and PND  · Gastrointestinal: Denies heartburn, nausea/vomiting, diarrhea and constipation, black/bloody, and tarry stools. · Genitourinary: Denies dysuria and hematuria  · Hematologic: Denies excessive bruising or bleeding      PHYSICAL EXAM:   /67   Pulse 87   Temp 98.4 °F (36.9 °C) (Oral)   Resp 18   Ht 5' 4\" (1.626 m)   Wt 171 lb 1.6 oz (77.6 kg)   LMP 11/16/2019 (LMP Unknown)   SpO2 96%   BMI 29.37 kg/m²   CONST:  Well developed, well nourished AAFwho appears stated age. Awake, alert, no apparent distress  HEENT:   Head- Normocephalic, atraumatic   Eyes- Conjunctivae pink, anicteric  Throat- Oral mucosa pink and moist  Neck-  No stridor, trachea midline, + jugular venous distention. CHEST: Chest symmetrical and non-tender to palpation. No accessory muscle use or intercostal retractions  RESPIRATORY: Lung sounds - clear throughout fields   CARDIOVASCULAR:     No carotid bruit  Heart Inspection- shows no noted pulsations  Heart Palpation- no heaves or thrills; PMI is non-displaced   Heart Ausculation- Regular rate and rhythm, no murmur. No s3, s4 or rub   PV: 1-2+ bilateral upper and lower extremity edema. No varicosities. Pedal pulses palpable, no clubbing or cyanosis   ABDOMEN: Soft, non-tender to light palpation. Bowel sounds present. No palpable masses no organomegaly; no abdominal bruit  MS: Good muscle strength and tone. No atrophy or abnormal movements. : Deferred  SKIN: Warm and dry no statis dermatitis or ulcers   NEURO / PSYCH: Oriented to person, place and time. Speech clear and appropriate. Follows all commands. ASSESSMENT:  1. Acute HFpEF versus volume overload due to acute on chronic renal failure                        2. Elevated troponin, chronically elevated. Likely type II due to demand ischemia due to substance abuse, BC on CKD and acute diastolic congestive heart failure. 3. Hypertension   4. BC on CKD diuresis as per renal  Placed on IV Bumex 2/9/2020  5. Chronic anemia Hgb 8.6 on 1/23/2020-->8.8  6. T2DM insulin requiring HgbA1x 8.8 on 1/23/2020  7. UTI + Klebsiella. Afebrile  8. R hand cellulitis with + BC x 1. ID following. 9. Nasal swab + MRSA  10. Altered mental status : resolved   11. Hypoalbuminemia/Malnutrition Albumin 2.1        PLAN:  1. Volume management as per nephrology service. 2. Blood pressure better controlled, continue current antihypertensive management. 3. No further cardiac work up indicated at this time. All prior cardiac testing from patient's last hospitalization has been reviewed. 4. We will sign off and see per request. Call with any questions or concerns. Glendia Barthel Dennard Hasty, [facility] [place], [address] [facility] Cardiology

## 2020-02-10 NOTE — CONSULTS
Inpatient CHF nurse met with the patient today to discuss her CHF and to provide education. The patient is agreeable to follow with the CHF clinic, following her discharge to home. She is scheduled for 02/25/2020 at 9:00 A. M.

## 2020-02-10 NOTE — PROGRESS NOTES
non tender without mass   Pulmonary/Chest: diminished bilaterally- no wheezes, rales or rhonchi, no respiratory distress  Cardiovascular: normal rate, normal S1 and S2   Abdomen: soft, non-tender, non-distended, normal bowel sounds, no masses or organomegaly  Extremities: no cyanosis, no clubbing and 3+ bilateral lower extremity edema, right hand erythema and edema  Neurologic: no cranial nerve deficit and speech normal      Recent Labs     02/08/20  2130 02/09/20  0550 02/10/20  0615    135 132   K 4.9 5.2* 4.9    100 98   CO2 24 26 25   BUN 63* 66* 64*   CREATININE 3.7* 3.9* 3.9*   GLUCOSE 95 56* 244*   CALCIUM 8.3* 8.1* 8.0*       Recent Labs     02/08/20  0552 02/09/20  0550 02/10/20  0615   WBC 5.8 5.5 6.6   RBC 3.00* 2.76* 2.99*   HGB 8.9* 8.2* 8.8*   HCT 28.4* 26.2* 27.9*   MCV 94.7 94.9 93.3   MCH 29.7 29.7 29.4   MCHC 31.3* 31.3* 31.5*   RDW 15.9* 16.2* 15.7*    235 282   MPV 10.5 10.3 10.3         Radiology:   XR HAND RIGHT (MIN 3 VIEWS)   Final Result   No acute findings. US DUP UPPER EXTREMITY RIGHT VENOUS   Final Result   1. No evidence of acute venous thrombosis right upper extremity. US DUP LOWER EXTREMITIES BILATERAL VENOUS   Final Result   No evidence of bilateral lower extremity deep venous   thrombus from common femoral vein to proximal calf. CT Head WO Contrast   Final Result   1. No acute intracranial abnormality, without significant change in   appearance of intracranial contents since 1/22/2020.   2. Sphenoid, bilateral ethmoid, and right maxillary sinus mucosal   disease, greatest involving the right maxillary sinus            XR CHEST PORTABLE   Final Result   1. Findings most likely representing CHF versus atypical infectious   process.            Assessment:  Active Problems:    Hypertension    Other specified diabetes mellitus with other specified complication ([facility] [address]    Altered mental status    Acute heart failure with preserved ejection fraction D deficiency:  Continue Vitamin D. 12. Anemia:  Continue MARIA TERESA  13. Hyperuricemia:  Defer treatment to Nephrology as NSAIDs and colchicine cannot be used due to CKD. 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 15. Patient requesting shower chair at discharge. NOTE: This report was transcribed using voice recognition software.  Every effort was made to ensure accuracy; however, inadvertent computerized transcription errors may be present.     Electronically signed by HEBER Archuleta CNP on 2/10/2020 at 2:25 PM

## 2020-02-10 NOTE — PROGRESS NOTES
Hypokalemia    Elevated troponin    Cellulitis of right hand       Tentative placement recommendation: Home Health Physical Therapy 5 days/week  Equipment recommendation: To be determined        Plan of care: Patient will be seen  daily  for therapeutic exercise, functional retraining, endurance activities, balance exercises, family and patient education. AM-PAC Basic Mobility       [facility] Mobility Inpatient   How much difficulty turning over in bed?: None  How much difficulty sitting down on / standing up from a chair with arms?: None  How much difficulty moving from lying on back to sitting on side of bed?: None  How much help from another person moving to and from a bed to a chair?: None  How much help from another person needed to walk in hospital room?: A Little  How much help from another person for climbing 3-5 steps with a railing?: A Little  [facility] Inpatient Mobility Raw Score : 22  AM-PAC Inpatient T-Scale Score : 53.28  Mobility Inpatient CMS 0-100% Score: 20.91  Mobility Inpatient CMS G-Code Modifier : [address] cleared patient for PT treatment and patient agreeable. Precautions:  falls and O2 , new 2 Liters of o2 via nasal cannula   Mentation: alert, cooperative, oriented x 3  and follows directions    PAIN: (measured on a visual analog scale with 0=no pain and 10=excruciating pain) 0/10. FUNCTIONAL ASSESSMENT   Bed Mobility- Supine to sit- Independent         Scooting- Independent      Sit to supine- Independent    Patient able to dangle on edge of bed for 5 minutes with Supervision assist      Transfers-Sit to stand- Supervision     Gait:  Patient ambulated 2 x 120 feet using no device with Supervision . SpO2 96% without wearing O2. Steps:  Not assessed          Treatment: Pt. Transferred to [facility] and  Sat edge of bed to increase dynamic sitting balance and activity tolerance. Pt. Ambulated in lee as above, performed exercises and returned to bed.    Therapist educated and

## 2020-02-10 NOTE — PROGRESS NOTES
Department of Internal Medicine  Nephrology Attending Progress Note        SUBJECTIVE: Called by the nurse stating that her discharge is being held for volume overload      Physical Exam:    VITALS:  /69   Pulse 83   Temp 97.7 °F (36.5 °C) (Oral)   Resp 16   Ht 5' 4\" (1.626 m)   Wt 171 lb 1.6 oz (77.6 kg)   LMP 11/16/2019 (LMP Unknown)   SpO2 96%   BMI 29.37 kg/m²   24HR INTAKE/OUTPUT:      Intake/Output Summary (Last 24 hours) at 2/10/2020 1146  Last data filed at 2/10/2020 0612  Gross per 24 hour   Intake 2042.5 ml   Output 4100 ml   Net -2057.5 ml           DATA:    CBC:   Lab Results   Component Value Date    WBC 6.6 02/10/2020    RBC 2.99 02/10/2020    HGB 8.8 02/10/2020    HCT 27.9 02/10/2020    MCV 93.3 02/10/2020    MCH 29.4 02/10/2020    MCHC 31.5 02/10/2020    RDW 15.7 02/10/2020     02/10/2020    MPV 10.3 02/10/2020     CMP:    Lab Results   Component Value Date     02/10/2020    K 4.9 02/10/2020    K 4.4 01/23/2020    CL 98 02/10/2020    CO2 25 02/10/2020    BUN 64 02/10/2020    CREATININE 3.9 02/10/2020    GFRAA 17 02/10/2020    LABGLOM 17 02/10/2020    GLUCOSE 244 02/10/2020    GLUCOSE 130 05/18/2012    PROT 5.4 02/10/2020    LABALBU 2.1 02/10/2020    LABALBU 4.1 05/18/2012    CALCIUM 8.0 02/10/2020    BILITOT <0.2 02/10/2020    ALKPHOS 159 02/10/2020    AST 34 02/10/2020    ALT 26 02/10/2020       IMPRESSION/RECOMMENDATIONS:      Acute kidney injury. CKD stage 4. Metabolic acidosis. Resolved. Anemia of CKD. Edema. Plan: With recurrent acute kidney injury and volume overload she will be needing hemodialysis for better fluid management  I also discussed the option of kidney transplantation which she agrees with  Continue  Bumex 2 mg IV twice daily  Labs in a.m.     Vitaly Nieves MD

## 2020-02-11 PROBLEM — E44.1 MILD PROTEIN-CALORIE MALNUTRITION (HCC): Chronic | Status: ACTIVE | Noted: 2020-02-11

## 2020-02-11 LAB
ALBUMIN SERPL-MCNC: 2.7 G/DL (ref 3.5–5.2)
ALP BLD-CCNC: 170 U/L (ref 35–104)
ALT SERPL-CCNC: 35 U/L (ref 0–32)
ANION GAP SERPL CALCULATED.3IONS-SCNC: 10 MMOL/L (ref 7–16)
ANION GAP SERPL CALCULATED.3IONS-SCNC: 11 MMOL/L (ref 7–16)
ANION GAP SERPL CALCULATED.3IONS-SCNC: 9 MMOL/L (ref 7–16)
AST SERPL-CCNC: 53 U/L (ref 0–31)
BASOPHILS ABSOLUTE: 0.09 E9/L (ref 0–0.2)
BASOPHILS RELATIVE PERCENT: 1.4 % (ref 0–2)
BILIRUB SERPL-MCNC: <0.2 MG/DL (ref 0–1.2)
BUN BLDV-MCNC: 61 MG/DL (ref 6–20)
BUN BLDV-MCNC: 63 MG/DL (ref 6–20)
BUN BLDV-MCNC: 63 MG/DL (ref 6–20)
CALCIUM SERPL-MCNC: 8.1 MG/DL (ref 8.6–10.2)
CALCIUM SERPL-MCNC: 8.4 MG/DL (ref 8.6–10.2)
CALCIUM SERPL-MCNC: 8.4 MG/DL (ref 8.6–10.2)
CHLORIDE BLD-SCNC: 101 MMOL/L (ref 98–107)
CHLORIDE BLD-SCNC: 97 MMOL/L (ref 98–107)
CHLORIDE BLD-SCNC: 98 MMOL/L (ref 98–107)
CO2: 25 MMOL/L (ref 22–29)
CO2: 25 MMOL/L (ref 22–29)
CO2: 26 MMOL/L (ref 22–29)
CREAT SERPL-MCNC: 3.9 MG/DL (ref 0.5–1)
CREAT SERPL-MCNC: 4 MG/DL (ref 0.5–1)
CREAT SERPL-MCNC: 4.1 MG/DL (ref 0.5–1)
EOSINOPHILS ABSOLUTE: 0.78 E9/L (ref 0.05–0.5)
EOSINOPHILS RELATIVE PERCENT: 12.4 % (ref 0–6)
GFR AFRICAN AMERICAN: 16
GFR AFRICAN AMERICAN: 16
GFR AFRICAN AMERICAN: 17
GFR NON-AFRICAN AMERICAN: 16 ML/MIN/1.73
GFR NON-AFRICAN AMERICAN: 16 ML/MIN/1.73
GFR NON-AFRICAN AMERICAN: 17 ML/MIN/1.73
GLUCOSE BLD-MCNC: 213 MG/DL (ref 74–99)
GLUCOSE BLD-MCNC: 27 MG/DL (ref 74–99)
GLUCOSE BLD-MCNC: 453 MG/DL (ref 74–99)
GLUCOSE BLD-MCNC: 603 MG/DL (ref 74–99)
HCT VFR BLD CALC: 28.5 % (ref 34–48)
HEMOGLOBIN: 8.7 G/DL (ref 11.5–15.5)
IMMATURE GRANULOCYTES #: 0.07 E9/L
IMMATURE GRANULOCYTES %: 1.1 % (ref 0–5)
LYMPHOCYTES ABSOLUTE: 1.5 E9/L (ref 1.5–4)
LYMPHOCYTES RELATIVE PERCENT: 23.8 % (ref 20–42)
MAGNESIUM: 1.9 MG/DL (ref 1.6–2.6)
MCH RBC QN AUTO: 29.3 PG (ref 26–35)
MCHC RBC AUTO-ENTMCNC: 30.5 % (ref 32–34.5)
MCV RBC AUTO: 96 FL (ref 80–99.9)
METER GLUCOSE: 108 MG/DL (ref 74–99)
METER GLUCOSE: 187 MG/DL (ref 74–99)
METER GLUCOSE: 307 MG/DL (ref 74–99)
METER GLUCOSE: 444 MG/DL (ref 74–99)
METER GLUCOSE: 474 MG/DL (ref 74–99)
METER GLUCOSE: 487 MG/DL (ref 74–99)
METER GLUCOSE: 74 MG/DL (ref 74–99)
METER GLUCOSE: 76 MG/DL (ref 74–99)
METER GLUCOSE: <40 MG/DL (ref 74–99)
METER GLUCOSE: <40 MG/DL (ref 74–99)
MONOCYTES ABSOLUTE: 0.48 E9/L (ref 0.1–0.95)
MONOCYTES RELATIVE PERCENT: 7.6 % (ref 2–12)
NEUTROPHILS ABSOLUTE: 3.37 E9/L (ref 1.8–7.3)
NEUTROPHILS RELATIVE PERCENT: 53.7 % (ref 43–80)
PDW BLD-RTO: 16.1 FL (ref 11.5–15)
PLATELET # BLD: 343 E9/L (ref 130–450)
PMV BLD AUTO: 10.2 FL (ref 7–12)
POTASSIUM SERPL-SCNC: 4.9 MMOL/L (ref 3.5–5)
POTASSIUM SERPL-SCNC: 5.4 MMOL/L (ref 3.5–5)
POTASSIUM SERPL-SCNC: 5.9 MMOL/L (ref 3.5–5)
POTASSIUM SERPL-SCNC: 6.2 MMOL/L (ref 3.5–5)
PRO-BNP: ABNORMAL PG/ML (ref 0–125)
RBC # BLD: 2.97 E12/L (ref 3.5–5.5)
SODIUM BLD-SCNC: 131 MMOL/L (ref 132–146)
SODIUM BLD-SCNC: 134 MMOL/L (ref 132–146)
SODIUM BLD-SCNC: 137 MMOL/L (ref 132–146)
TOTAL PROTEIN: 5.9 G/DL (ref 6.4–8.3)
WBC # BLD: 6.3 E9/L (ref 4.5–11.5)

## 2020-02-11 PROCEDURE — 2580000003 HC RX 258: Performed by: INTERNAL MEDICINE

## 2020-02-11 PROCEDURE — 6370000000 HC RX 637 (ALT 250 FOR IP): Performed by: INTERNAL MEDICINE

## 2020-02-11 PROCEDURE — 87186 SC STD MICRODIL/AGAR DIL: CPT

## 2020-02-11 PROCEDURE — 2500000003 HC RX 250 WO HCPCS: Performed by: INTERNAL MEDICINE

## 2020-02-11 PROCEDURE — 85025 COMPLETE CBC W/AUTO DIFF WBC: CPT

## 2020-02-11 PROCEDURE — 6370000000 HC RX 637 (ALT 250 FOR IP): Performed by: NURSE PRACTITIONER

## 2020-02-11 PROCEDURE — 36415 COLL VENOUS BLD VENIPUNCTURE: CPT

## 2020-02-11 PROCEDURE — 84132 ASSAY OF SERUM POTASSIUM: CPT

## 2020-02-11 PROCEDURE — 80053 COMPREHEN METABOLIC PANEL: CPT

## 2020-02-11 PROCEDURE — 83880 ASSAY OF NATRIURETIC PEPTIDE: CPT

## 2020-02-11 PROCEDURE — 1200000000 HC SEMI PRIVATE

## 2020-02-11 PROCEDURE — 87070 CULTURE OTHR SPECIMN AEROBIC: CPT

## 2020-02-11 PROCEDURE — 99233 SBSQ HOSP IP/OBS HIGH 50: CPT | Performed by: INTERNAL MEDICINE

## 2020-02-11 PROCEDURE — 6360000002 HC RX W HCPCS: Performed by: INTERNAL MEDICINE

## 2020-02-11 PROCEDURE — 87205 SMEAR GRAM STAIN: CPT

## 2020-02-11 PROCEDURE — 87075 CULTR BACTERIA EXCEPT BLOOD: CPT

## 2020-02-11 PROCEDURE — 83735 ASSAY OF MAGNESIUM: CPT

## 2020-02-11 PROCEDURE — 80048 BASIC METABOLIC PNL TOTAL CA: CPT

## 2020-02-11 PROCEDURE — 6370000000 HC RX 637 (ALT 250 FOR IP): Performed by: SPECIALIST

## 2020-02-11 PROCEDURE — APPSS30 APP SPLIT SHARED TIME 16-30 MINUTES: Performed by: NURSE PRACTITIONER

## 2020-02-11 PROCEDURE — 82962 GLUCOSE BLOOD TEST: CPT

## 2020-02-11 PROCEDURE — 2580000003 HC RX 258: Performed by: NURSE PRACTITIONER

## 2020-02-11 PROCEDURE — 82947 ASSAY GLUCOSE BLOOD QUANT: CPT

## 2020-02-11 RX ORDER — DEXTROSE MONOHYDRATE 25 G/50ML
25 INJECTION, SOLUTION INTRAVENOUS ONCE
Status: COMPLETED | OUTPATIENT
Start: 2020-02-11 | End: 2020-02-11

## 2020-02-11 RX ORDER — LIDOCAINE HYDROCHLORIDE 10 MG/ML
20 INJECTION, SOLUTION INFILTRATION; PERINEURAL ONCE
Status: DISCONTINUED | OUTPATIENT
Start: 2020-02-11 | End: 2020-02-15 | Stop reason: HOSPADM

## 2020-02-11 RX ORDER — INSULIN GLARGINE 100 [IU]/ML
15 INJECTION, SOLUTION SUBCUTANEOUS NIGHTLY
Status: DISCONTINUED | OUTPATIENT
Start: 2020-02-11 | End: 2020-02-11

## 2020-02-11 RX ORDER — DEXTROSE MONOHYDRATE 100 MG/ML
INJECTION, SOLUTION INTRAVENOUS CONTINUOUS
Status: DISCONTINUED | OUTPATIENT
Start: 2020-02-11 | End: 2020-02-11

## 2020-02-11 RX ORDER — MORPHINE SULFATE 2 MG/ML
1 INJECTION, SOLUTION INTRAMUSCULAR; INTRAVENOUS ONCE
Status: COMPLETED | OUTPATIENT
Start: 2020-02-11 | End: 2020-02-11

## 2020-02-11 RX ORDER — SODIUM POLYSTYRENE SULFONATE 15 G/60ML
30 SUSPENSION ORAL; RECTAL ONCE
Status: COMPLETED | OUTPATIENT
Start: 2020-02-11 | End: 2020-02-11

## 2020-02-11 RX ORDER — FLUDROCORTISONE ACETATE 0.1 MG/1
0.1 TABLET ORAL DAILY
Status: COMPLETED | OUTPATIENT
Start: 2020-02-11 | End: 2020-02-12

## 2020-02-11 RX ADMIN — LORAZEPAM 0.5 MG: 0.5 TABLET ORAL at 01:15

## 2020-02-11 RX ADMIN — LORAZEPAM 0.5 MG: 0.5 TABLET ORAL at 16:00

## 2020-02-11 RX ADMIN — CLONIDINE HYDROCHLORIDE 0.3 MG: 0.2 TABLET ORAL at 13:08

## 2020-02-11 RX ADMIN — MORPHINE SULFATE 1 MG: 2 INJECTION, SOLUTION INTRAMUSCULAR; INTRAVENOUS at 20:50

## 2020-02-11 RX ADMIN — BUMETANIDE 2 MG: 0.25 INJECTION INTRAMUSCULAR; INTRAVENOUS at 08:51

## 2020-02-11 RX ADMIN — CLONIDINE HYDROCHLORIDE 0.3 MG: 0.2 TABLET ORAL at 20:49

## 2020-02-11 RX ADMIN — MUPIROCIN: 20 OINTMENT TOPICAL at 08:51

## 2020-02-11 RX ADMIN — SODIUM POLYSTYRENE SULFONATE 30 G: 15 SUSPENSION ORAL; RECTAL at 18:41

## 2020-02-11 RX ADMIN — CIPROFLOXACIN HYDROCHLORIDE 250 MG: 250 TABLET, FILM COATED ORAL at 09:39

## 2020-02-11 RX ADMIN — HEPARIN SODIUM 5000 UNITS: 5000 INJECTION, SOLUTION INTRAVENOUS; SUBCUTANEOUS at 13:05

## 2020-02-11 RX ADMIN — HYDRALAZINE HYDROCHLORIDE 100 MG: 25 TABLET, FILM COATED ORAL at 20:49

## 2020-02-11 RX ADMIN — TRAMADOL HYDROCHLORIDE 50 MG: 50 TABLET, FILM COATED ORAL at 12:37

## 2020-02-11 RX ADMIN — INSULIN HUMAN 5 UNITS: 100 INJECTION, SOLUTION PARENTERAL at 18:24

## 2020-02-11 RX ADMIN — FLUDROCORTISONE ACETATE 0.1 MG: 0.1 TABLET ORAL at 18:20

## 2020-02-11 RX ADMIN — HYDRALAZINE HYDROCHLORIDE 100 MG: 25 TABLET, FILM COATED ORAL at 06:19

## 2020-02-11 RX ADMIN — ATORVASTATIN CALCIUM 40 MG: 40 TABLET, FILM COATED ORAL at 20:49

## 2020-02-11 RX ADMIN — DEXTROSE MONOHYDRATE: 100 INJECTION, SOLUTION INTRAVENOUS at 02:15

## 2020-02-11 RX ADMIN — Medication 10 ML: at 08:55

## 2020-02-11 RX ADMIN — HEPARIN SODIUM 5000 UNITS: 5000 INJECTION, SOLUTION INTRAVENOUS; SUBCUTANEOUS at 20:49

## 2020-02-11 RX ADMIN — DEXTROSE MONOHYDRATE 25 G: 25 INJECTION, SOLUTION INTRAVENOUS at 18:24

## 2020-02-11 RX ADMIN — Medication 10 ML: at 20:50

## 2020-02-11 RX ADMIN — INSULIN LISPRO 2 UNITS: 100 INJECTION, SOLUTION INTRAVENOUS; SUBCUTANEOUS at 20:58

## 2020-02-11 RX ADMIN — DILTIAZEM HYDROCHLORIDE 240 MG: 240 CAPSULE, COATED, EXTENDED RELEASE ORAL at 08:51

## 2020-02-11 RX ADMIN — MUPIROCIN: 20 OINTMENT TOPICAL at 21:30

## 2020-02-11 RX ADMIN — INSULIN LISPRO 6 UNITS: 100 INJECTION, SOLUTION INTRAVENOUS; SUBCUTANEOUS at 13:08

## 2020-02-11 RX ADMIN — INSULIN LISPRO 12 UNITS: 100 INJECTION, SOLUTION INTRAVENOUS; SUBCUTANEOUS at 18:42

## 2020-02-11 RX ADMIN — HYDRALAZINE HYDROCHLORIDE 100 MG: 25 TABLET, FILM COATED ORAL at 13:08

## 2020-02-11 RX ADMIN — HEPARIN SODIUM 5000 UNITS: 5000 INJECTION, SOLUTION INTRAVENOUS; SUBCUTANEOUS at 06:03

## 2020-02-11 RX ADMIN — AMITRIPTYLINE HYDROCHLORIDE 10 MG: 10 TABLET, FILM COATED ORAL at 20:49

## 2020-02-11 RX ADMIN — CLONIDINE HYDROCHLORIDE 0.3 MG: 0.2 TABLET ORAL at 08:50

## 2020-02-11 RX ADMIN — INSULIN LISPRO 15 UNITS: 100 INJECTION, SOLUTION INTRAVENOUS; SUBCUTANEOUS at 20:55

## 2020-02-11 RX ADMIN — METOPROLOL TARTRATE 100 MG: 25 TABLET ORAL at 08:50

## 2020-02-11 RX ADMIN — SODIUM BICARBONATE 50 MEQ: 84 INJECTION, SOLUTION INTRAVENOUS at 18:22

## 2020-02-11 RX ADMIN — ASPIRIN 81 MG 81 MG: 81 TABLET ORAL at 08:51

## 2020-02-11 RX ADMIN — CALCIUM GLUCONATE 1 G: 98 INJECTION, SOLUTION INTRAVENOUS at 17:08

## 2020-02-11 RX ADMIN — TRAMADOL HYDROCHLORIDE 50 MG: 50 TABLET, FILM COATED ORAL at 18:38

## 2020-02-11 RX ADMIN — Medication 30 G: at 01:22

## 2020-02-11 RX ADMIN — METOPROLOL TARTRATE 100 MG: 25 TABLET ORAL at 20:49

## 2020-02-11 RX ADMIN — DEXTROSE 50 % IN WATER (D50W) INTRAVENOUS SYRINGE 12.5 G: at 01:59

## 2020-02-11 RX ADMIN — BUMETANIDE 2 MG: 0.25 INJECTION INTRAMUSCULAR; INTRAVENOUS at 20:49

## 2020-02-11 ASSESSMENT — PAIN DESCRIPTION - PAIN TYPE
TYPE: ACUTE PAIN
TYPE: SURGICAL PAIN

## 2020-02-11 ASSESSMENT — PAIN DESCRIPTION - ORIENTATION
ORIENTATION: LOWER
ORIENTATION: RIGHT

## 2020-02-11 ASSESSMENT — PAIN DESCRIPTION - FREQUENCY: FREQUENCY: INTERMITTENT

## 2020-02-11 ASSESSMENT — PAIN DESCRIPTION - ONSET: ONSET: ON-GOING

## 2020-02-11 ASSESSMENT — PAIN SCALES - GENERAL
PAINLEVEL_OUTOF10: 7
PAINLEVEL_OUTOF10: 9
PAINLEVEL_OUTOF10: 7
PAINLEVEL_OUTOF10: 10
PAINLEVEL_OUTOF10: 0
PAINLEVEL_OUTOF10: 0
PAINLEVEL_OUTOF10: 6

## 2020-02-11 ASSESSMENT — PAIN DESCRIPTION - DESCRIPTORS
DESCRIPTORS: ACHING
DESCRIPTORS: ACHING

## 2020-02-11 ASSESSMENT — PAIN DESCRIPTION - LOCATION
LOCATION: HAND
LOCATION: BACK

## 2020-02-11 NOTE — PROGRESS NOTES
Department of Orthopedic Surgery  Resident Progress Note    Patient seen and examined. Pain controlled. No new complaints. Denies chest pain, shortness of breath, dizziness/lightheadedness. Swelling has decreased but continued pain    VITALS:  /69   Pulse 83   Temp 97.7 °F (36.5 °C) (Oral)   Resp 16   Ht 5' 4\" (1.626 m)   Wt 171 lb 1.6 oz (77.6 kg)   LMP 11/16/2019 (LMP Unknown)   SpO2 96%   BMI 29.37 kg/m²     General: alert and oriented to person, place and time, well-developed and well-nourished, in no acute distress    MUSCULOSKELETAL:   right upper extremity:  · Minimal erythema. Edema decreased on the dorsum of the hand  · Some mild tenderness over the dorsum of the hand. · No abscess, no warmth   · Compartments soft and compressible  · +AIN/PIN/Ulnar/Median/Radial nerve function intact grossly  · +2/4 Radial pulse, Cap refill <2 sec  · Sensation intact to touch in radial/ulnar/median nerve distributions to hand    CBC:   Lab Results   Component Value Date    WBC 6.6 02/10/2020    HGB 8.8 02/10/2020    HCT 27.9 02/10/2020     02/10/2020     PT/INR:    Lab Results   Component Value Date    PROTIME 10.9 02/04/2020    PROTIME 13.6 08/14/2011    INR 1.0 02/04/2020       ASSESSMENT  · Right hand cellulitis    PLAN      · Continue physical therapy and protocol: WBAT - RUE  · ABX per ID  · Deep venous thrombosis prophylaxis - per admitting, early mobilization  · PT/OT  · Pain Control: IV and PO  · Monitor H&H  · Patient is improving on ABX will continue to monitor at this time not orthopedic intervention necessary.   · Discussed with Dr. Jean-Paul Staples

## 2020-02-11 NOTE — PROGRESS NOTES
[facility] Quality Flow/Interdisciplinary Rounds Progress Note        Quality Flow Rounds held on February 11, 2020    Disciplines Attending:  Bedside Nurse, ,  and Nursing Unit Omar Patricia was admitted on 2/4/2020  1:35 AM    Anticipated Discharge Date:  Expected Discharge Date: 02/08/20    Disposition:    Benedicto Score:  Benedicto Scale Score: 21    Readmission Risk              Risk of Unplanned Readmission:        64           Discussed patient goal for the day, patient clinical progression, and barriers to discharge. The following Goal(s) of the Day/Commitment(s) have been identified:   melody Reyes?, RA, From       Steph Nguyen  February 11, 2020

## 2020-02-11 NOTE — PROGRESS NOTES
Department of Orthopedic Surgery  Resident Progress Note    Patient seen and examined. Patient states pain has improved however still bothers her. No new complaints. Denies chest pain, shortness of breath, dizziness/lightheadedness.      VITALS:  BP (!) 148/75   Pulse 85   Temp 97.6 °F (36.4 °C) (Oral)   Resp 18   Ht 5' 4\" (1.626 m)   Wt 171 lb 1.6 oz (77.6 kg)   LMP 11/16/2019 (LMP Unknown)   SpO2 98%   BMI 29.37 kg/m²     General: alert and oriented to person, place and time, well-developed and well-nourished, in no acute distress    MUSCULOSKELETAL:   right upper extremity:  · Minimal erythema to dorsum of hand  · Mild erythema to palmar aspect and first webspace   · +tenderness to first webspace   · Minimal fluctuance to first webspace   · Compartments soft and compressible  · +AIN/PIN/Ulnar/Median/Radial nerve function intact grossly  · +2/4 Radial pulse, Cap refill <2 sec  · Sensation intact to touch in radial/ulnar/median nerve distributions to hand    CBC:   Lab Results   Component Value Date    WBC 6.3 02/11/2020    HGB 8.7 02/11/2020    HCT 28.5 02/11/2020     02/11/2020     PT/INR:    Lab Results   Component Value Date    PROTIME 10.9 02/04/2020    PROTIME 13.6 08/14/2011    INR 1.0 02/04/2020       ASSESSMENT  · Right hand cellulitis, possible abscess    PLAN      · Continue physical therapy and protocol: WBAT - RUE  · Continue ABX per ID  · Strict elevation of RUE  · Deep venous thrombosis prophylaxis - per admitting, early mobilization  · PT/OT  · Pain Control: IV and PO  · Monitor H&H  · Patient is improving on ABX, possible bedside I&D today   · Discuss with Dr. Aster Hicks

## 2020-02-11 NOTE — PROGRESS NOTES
Physical Therapy    [id number]    Patient unavailable for physical therapy treatment due to declining ambulation this afternoon, states everyone keeps waking her up.

## 2020-02-11 NOTE — PROGRESS NOTES
reactive pupils. AT/NC   Chest:   No use of accessory muscles to breathe. Symmetrical expansion. Cardiovascular:  S1 and S2 are rhythmic and regular. No murmurs appreciated. Abdomen:   Positive bowel sounds to auscultation. Benign to palpation. Extremities:   No clubbing, no cyanosis, no edema.  right hand better  CNS    AAxO   Lines: piv  Reyes yellow clear    Radiology:  Laboratory and Tests Review:  Lab Results   Component Value Date    WBC 6.3 02/11/2020    WBC 6.6 02/10/2020    WBC 5.5 02/09/2020    HGB 8.7 (L) 02/11/2020    HCT 28.5 (L) 02/11/2020    MCV 96.0 02/11/2020     02/11/2020     No results found for: [facility] [[email or web address]]  Lab Results   Component Value Date    ALT 35 (H) 02/11/2020    AST 53 (H) 02/11/2020    ALKPHOS 170 (H) 02/11/2020    BILITOT <0.2 02/11/2020     Lab Results   Component Value Date     02/11/2020    K 6.2 02/11/2020    K 4.4 01/23/2020    CL 97 02/11/2020    CO2 25 02/11/2020    BUN 61 02/11/2020    CREATININE 3.9 02/11/2020    CREATININE 4.0 02/11/2020    CREATININE 3.9 02/10/2020    GFRAA 17 02/11/2020    LABGLOM 17 02/11/2020    GLUCOSE 603 02/11/2020    GLUCOSE 130 05/18/2012    PROT 5.9 02/11/2020    LABALBU 2.7 02/11/2020    LABALBU 4.1 05/18/2012    CALCIUM 8.1 02/11/2020    BILITOT <0.2 02/11/2020    ALKPHOS 170 02/11/2020    AST 53 02/11/2020    ALT 35 02/11/2020     Lab Results   Component Value Date    CRP 43.1 (H) 11/01/2019    CRP 0.3 10/02/2019    CRP 0.4 09/28/2017     Lab Results   Component Value Date    SEDRATE 135 (H) 11/01/2019    SEDRATE 14 09/28/2017       Microbiology:   Lab Results   Component Value Date    BLOODCULT2 24 Hours- no growth 02/09/2020    BLOODCULT2 24 Hours- no growth 02/08/2020    BLOODCULT2 5 Days- no growth 10/30/2019    ORG MRSA nasal colonization 02/08/2020    ORG Coagulase Negative Staphylococci 02/08/2020    ORG Klebsiella pneumoniae ssp pneumoniae 02/08/2020     WOUND/ABSCESS   Date Value Ref Range Status   12/11/2014 (A)  Final

## 2020-02-11 NOTE — PROGRESS NOTES
[address] [place], [place] [address]ist   Progress Note    Admitting Date and Time: 2/4/2020  1:35 AM  Admit Dx: Altered mental status [R41.82]    Subjective:    Patient reports feeling overall bad. She feels like she is not getting any better. ROS: denies fever, chills, cp, sob, n/v, HA unless stated above.      mupirocin   Topical BID    bumetanide  2 mg Intravenous BID    ciprofloxacin  250 mg Oral Daily    vitamin D  50,000 Units Oral Weekly    epoetin nena-epbx  8,000 Units Subcutaneous Once per day on Mon Wed Fri    sodium chloride flush  10 mL Intravenous 2 times per day    heparin (porcine)  5,000 Units Subcutaneous 3 times per day    amitriptyline  10 mg Oral Nightly    aspirin  81 mg Oral Daily    atorvastatin  40 mg Oral Nightly    cloNIDine  0.3 mg Oral TID    diltiazem  240 mg Oral Daily    hydrALAZINE  100 mg Oral 3 times per day    metoprolol  100 mg Oral BID     LORazepam, 0.5 mg, BID PRN  glucose, 15 g, PRN  dextrose, 12.5 g, PRN  glucagon (rDNA), 1 mg, PRN  dextrose, 100 mL/hr, PRN  sodium chloride flush, 10 mL, PRN  magnesium hydroxide, 30 mL, Daily PRN  ondansetron, 4 mg, Q6H PRN  acetaminophen, 650 mg, Q4H PRN  hydrALAZINE, 10 mg, Q4H PRN  labetalol, 10 mg, Q4H PRN  traMADol, 50 mg, Q6H PRN         Objective:    BP (!) 147/77   Pulse 85   Temp 98.4 °F (36.9 °C) (Oral)   Resp 18   Ht 5' 4\" (1.626 m)   Wt 171 lb 1.6 oz (77.6 kg)   LMP 11/16/2019 (LMP Unknown)   SpO2 94%   BMI 29.37 kg/m²   General Appearance: alert and oriented to person, place and time and in no acute distress  Skin: warm and dry  Head: normocephalic and atraumatic  Eyes: pupils equal, round, and reactive to light, extraocular eye movements intact, conjunctivae normal  Neck: neck supple and non tender without mass   Pulmonary/Chest: diminished bilaterally- no wheezes, rales or rhonchi, no respiratory distress  Cardiovascular: normal rate, normal S1 and S2   Abdomen: soft, non-tender, non-distended,

## 2020-02-11 NOTE — PROGRESS NOTES
Department of Internal Medicine  Nephrology Attending Progress Note        SUBJECTIVE: Called by the nurse stating that her discharge is being held for volume overload      Physical Exam:    VITALS:  BP (!) 145/84   Pulse 85   Temp 97.6 °F (36.4 °C) (Oral)   Resp 18   Ht 5' 4\" (1.626 m)   Wt 171 lb 1.6 oz (77.6 kg)   LMP 11/16/2019 (LMP Unknown)   SpO2 98%   BMI 29.37 kg/m²   24HR INTAKE/OUTPUT:      Intake/Output Summary (Last 24 hours) at 2/11/2020 1602  Last data filed at 2/11/2020 1403  Gross per 24 hour   Intake 600 ml   Output 2450 ml   Net -1850 ml           DATA:    CBC:   Lab Results   Component Value Date    WBC 6.3 02/11/2020    RBC 2.97 02/11/2020    HGB 8.7 02/11/2020    HCT 28.5 02/11/2020    MCV 96.0 02/11/2020    MCH 29.3 02/11/2020    MCHC 30.5 02/11/2020    RDW 16.1 02/11/2020     02/11/2020    MPV 10.2 02/11/2020     CMP:    Lab Results   Component Value Date     02/11/2020    K 6.2 02/11/2020    K 4.4 01/23/2020    CL 97 02/11/2020    CO2 25 02/11/2020    BUN 61 02/11/2020    CREATININE 3.9 02/11/2020    GFRAA 17 02/11/2020    LABGLOM 17 02/11/2020    GLUCOSE 603 02/11/2020    GLUCOSE 130 05/18/2012    PROT 5.9 02/11/2020    LABALBU 2.7 02/11/2020    LABALBU 4.1 05/18/2012    CALCIUM 8.1 02/11/2020    BILITOT <0.2 02/11/2020    ALKPHOS 170 02/11/2020    AST 53 02/11/2020    ALT 35 02/11/2020       IMPRESSION/RECOMMENDATIONS:      Acute kidney injury. CKD stage 4. Metabolic acidosis. Resolved. Anemia of CKD. Edema. Hyperkalemia    Plan:  - Insulin + Glu, Sodium bicarbonate, calcium carbonate and kayexalate.  - Repeat K check and treat aggressively. - Hemodialysis cath ASAP.  - Discussed with primary team.  - Discussed with patient. Agrees with plan.

## 2020-02-11 NOTE — PLAN OF CARE
Problem: Malnutrition  (NI-5.2)  Goal: Food and/or Nutrient Delivery  Pt will tolerate diet and ONS with at least 75% intake  Description  Individualized approach for food/nutrient provision.   Outcome: Met This Shift

## 2020-02-11 NOTE — PROGRESS NOTES
Orthopedic Progress Note:  Verbal informed consent obtained. 8 cc of 1% lidocaine used for local under sterile conditions. A sterile field was created and two small incisions were made over fluctuant mass. Blunt dissection obtained. No purulence expressed. Wounds irrigated and 4-0 nylon was placed in each incision. Sterile dressing applied.   Cultures sent

## 2020-02-12 ENCOUNTER — APPOINTMENT (OUTPATIENT)
Dept: INTERVENTIONAL RADIOLOGY/VASCULAR | Age: 30
DRG: 194 | End: 2020-02-12
Payer: COMMERCIAL

## 2020-02-12 LAB
ALBUMIN SERPL-MCNC: 2.5 G/DL (ref 3.5–5.2)
ALP BLD-CCNC: 203 U/L (ref 35–104)
ALT SERPL-CCNC: 217 U/L (ref 0–32)
ANION GAP SERPL CALCULATED.3IONS-SCNC: 12 MMOL/L (ref 7–16)
AST SERPL-CCNC: 547 U/L (ref 0–31)
BASOPHILS ABSOLUTE: 0.12 E9/L (ref 0–0.2)
BASOPHILS RELATIVE PERCENT: 1.9 % (ref 0–2)
BILIRUB SERPL-MCNC: <0.2 MG/DL (ref 0–1.2)
BUN BLDV-MCNC: 62 MG/DL (ref 6–20)
CALCIUM SERPL-MCNC: 8.1 MG/DL (ref 8.6–10.2)
CHLORIDE BLD-SCNC: 101 MMOL/L (ref 98–107)
CO2: 26 MMOL/L (ref 22–29)
CREAT SERPL-MCNC: 3.9 MG/DL (ref 0.5–1)
EOSINOPHILS ABSOLUTE: 0.52 E9/L (ref 0.05–0.5)
EOSINOPHILS RELATIVE PERCENT: 8.4 % (ref 0–6)
GFR AFRICAN AMERICAN: 17
GFR NON-AFRICAN AMERICAN: 17 ML/MIN/1.73
GLUCOSE BLD-MCNC: 203 MG/DL (ref 74–99)
HCT VFR BLD CALC: 27.2 % (ref 34–48)
HEMOGLOBIN: 8.4 G/DL (ref 11.5–15.5)
IMMATURE GRANULOCYTES #: 0.07 E9/L
IMMATURE GRANULOCYTES %: 1.1 % (ref 0–5)
INR BLD: 1
LYMPHOCYTES ABSOLUTE: 1.81 E9/L (ref 1.5–4)
LYMPHOCYTES RELATIVE PERCENT: 29.4 % (ref 20–42)
MAGNESIUM: 1.7 MG/DL (ref 1.6–2.6)
MCH RBC QN AUTO: 29.5 PG (ref 26–35)
MCHC RBC AUTO-ENTMCNC: 30.9 % (ref 32–34.5)
MCV RBC AUTO: 95.4 FL (ref 80–99.9)
METER GLUCOSE: 119 MG/DL (ref 74–99)
METER GLUCOSE: 167 MG/DL (ref 74–99)
METER GLUCOSE: 234 MG/DL (ref 74–99)
METER GLUCOSE: 348 MG/DL (ref 74–99)
METER GLUCOSE: 419 MG/DL (ref 74–99)
METER GLUCOSE: 74 MG/DL (ref 74–99)
MONOCYTES ABSOLUTE: 0.55 E9/L (ref 0.1–0.95)
MONOCYTES RELATIVE PERCENT: 8.9 % (ref 2–12)
NEUTROPHILS ABSOLUTE: 3.09 E9/L (ref 1.8–7.3)
NEUTROPHILS RELATIVE PERCENT: 50.3 % (ref 43–80)
PDW BLD-RTO: 15.9 FL (ref 11.5–15)
PLATELET # BLD: 351 E9/L (ref 130–450)
PMV BLD AUTO: 10.1 FL (ref 7–12)
POTASSIUM SERPL-SCNC: 4.3 MMOL/L (ref 3.5–5)
PROTHROMBIN TIME: 11.3 SEC (ref 9.3–12.4)
RBC # BLD: 2.85 E12/L (ref 3.5–5.5)
SODIUM BLD-SCNC: 139 MMOL/L (ref 132–146)
TOTAL PROTEIN: 5.4 G/DL (ref 6.4–8.3)
WBC # BLD: 6.2 E9/L (ref 4.5–11.5)

## 2020-02-12 PROCEDURE — 05HM33Z INSERTION OF INFUSION DEVICE INTO RIGHT INTERNAL JUGULAR VEIN, PERCUTANEOUS APPROACH: ICD-10-PCS | Performed by: INTERNAL MEDICINE

## 2020-02-12 PROCEDURE — 85025 COMPLETE CBC W/AUTO DIFF WBC: CPT

## 2020-02-12 PROCEDURE — 6370000000 HC RX 637 (ALT 250 FOR IP): Performed by: SPECIALIST

## 2020-02-12 PROCEDURE — 6360000002 HC RX W HCPCS: Performed by: INTERNAL MEDICINE

## 2020-02-12 PROCEDURE — 6370000000 HC RX 637 (ALT 250 FOR IP): Performed by: INTERNAL MEDICINE

## 2020-02-12 PROCEDURE — 83735 ASSAY OF MAGNESIUM: CPT

## 2020-02-12 PROCEDURE — 2580000003 HC RX 258: Performed by: INTERNAL MEDICINE

## 2020-02-12 PROCEDURE — APPSS30 APP SPLIT SHARED TIME 16-30 MINUTES: Performed by: NURSE PRACTITIONER

## 2020-02-12 PROCEDURE — 77001 FLUOROGUIDE FOR VEIN DEVICE: CPT | Performed by: RADIOLOGY

## 2020-02-12 PROCEDURE — 6360000002 HC RX W HCPCS: Performed by: SPECIALIST

## 2020-02-12 PROCEDURE — 76937 US GUIDE VASCULAR ACCESS: CPT | Performed by: RADIOLOGY

## 2020-02-12 PROCEDURE — 1200000000 HC SEMI PRIVATE

## 2020-02-12 PROCEDURE — 6370000000 HC RX 637 (ALT 250 FOR IP): Performed by: NURSE PRACTITIONER

## 2020-02-12 PROCEDURE — 36415 COLL VENOUS BLD VENIPUNCTURE: CPT

## 2020-02-12 PROCEDURE — 36558 INSERT TUNNELED CV CATH: CPT | Performed by: RADIOLOGY

## 2020-02-12 PROCEDURE — 0JH63XZ INSERTION OF TUNNELED VASCULAR ACCESS DEVICE INTO CHEST SUBCUTANEOUS TISSUE AND FASCIA, PERCUTANEOUS APPROACH: ICD-10-PCS | Performed by: INTERNAL MEDICINE

## 2020-02-12 PROCEDURE — 82962 GLUCOSE BLOOD TEST: CPT

## 2020-02-12 PROCEDURE — 90935 HEMODIALYSIS ONE EVALUATION: CPT

## 2020-02-12 PROCEDURE — 85610 PROTHROMBIN TIME: CPT

## 2020-02-12 PROCEDURE — 2500000003 HC RX 250 WO HCPCS: Performed by: INTERNAL MEDICINE

## 2020-02-12 PROCEDURE — 80053 COMPREHEN METABOLIC PANEL: CPT

## 2020-02-12 PROCEDURE — 97110 THERAPEUTIC EXERCISES: CPT

## 2020-02-12 PROCEDURE — 6360000002 HC RX W HCPCS: Performed by: RADIOLOGY

## 2020-02-12 PROCEDURE — 99233 SBSQ HOSP IP/OBS HIGH 50: CPT | Performed by: INTERNAL MEDICINE

## 2020-02-12 PROCEDURE — C1894 INTRO/SHEATH, NON-LASER: HCPCS

## 2020-02-12 PROCEDURE — 5A1D70Z PERFORMANCE OF URINARY FILTRATION, INTERMITTENT, LESS THAN 6 HOURS PER DAY: ICD-10-PCS | Performed by: INTERNAL MEDICINE

## 2020-02-12 PROCEDURE — 2580000003 HC RX 258: Performed by: SPECIALIST

## 2020-02-12 RX ORDER — MORPHINE SULFATE 2 MG/ML
1 INJECTION, SOLUTION INTRAMUSCULAR; INTRAVENOUS ONCE
Status: COMPLETED | OUTPATIENT
Start: 2020-02-12 | End: 2020-02-12

## 2020-02-12 RX ORDER — MIDAZOLAM HYDROCHLORIDE 1 MG/ML
1 INJECTION INTRAMUSCULAR; INTRAVENOUS ONCE
Status: DISCONTINUED | OUTPATIENT
Start: 2020-02-12 | End: 2020-02-12 | Stop reason: ALTCHOICE

## 2020-02-12 RX ORDER — OXYCODONE HYDROCHLORIDE AND ACETAMINOPHEN 5; 325 MG/1; MG/1
1 TABLET ORAL EVERY 4 HOURS PRN
Status: DISCONTINUED | OUTPATIENT
Start: 2020-02-12 | End: 2020-02-15 | Stop reason: HOSPADM

## 2020-02-12 RX ORDER — FENTANYL CITRATE 50 UG/ML
50 INJECTION, SOLUTION INTRAMUSCULAR; INTRAVENOUS ONCE
Status: DISCONTINUED | OUTPATIENT
Start: 2020-02-12 | End: 2020-02-12 | Stop reason: ALTCHOICE

## 2020-02-12 RX ADMIN — MUPIROCIN: 20 OINTMENT TOPICAL at 09:49

## 2020-02-12 RX ADMIN — HYDRALAZINE HYDROCHLORIDE 100 MG: 25 TABLET, FILM COATED ORAL at 22:27

## 2020-02-12 RX ADMIN — DILTIAZEM HYDROCHLORIDE 240 MG: 240 CAPSULE, COATED, EXTENDED RELEASE ORAL at 09:48

## 2020-02-12 RX ADMIN — BUMETANIDE 2 MG: 0.25 INJECTION INTRAMUSCULAR; INTRAVENOUS at 21:45

## 2020-02-12 RX ADMIN — ASPIRIN 81 MG 81 MG: 81 TABLET ORAL at 09:47

## 2020-02-12 RX ADMIN — METOPROLOL TARTRATE 100 MG: 25 TABLET ORAL at 21:45

## 2020-02-12 RX ADMIN — CIPROFLOXACIN HYDROCHLORIDE 250 MG: 250 TABLET, FILM COATED ORAL at 09:48

## 2020-02-12 RX ADMIN — OXYCODONE HYDROCHLORIDE AND ACETAMINOPHEN 1 TABLET: 5; 325 TABLET ORAL at 09:54

## 2020-02-12 RX ADMIN — CLONIDINE HYDROCHLORIDE 0.3 MG: 0.2 TABLET ORAL at 09:47

## 2020-02-12 RX ADMIN — Medication 10 ML: at 09:49

## 2020-02-12 RX ADMIN — HYDRALAZINE HYDROCHLORIDE 100 MG: 25 TABLET, FILM COATED ORAL at 05:47

## 2020-02-12 RX ADMIN — MIDAZOLAM 1 MG: 1 INJECTION INTRAMUSCULAR; INTRAVENOUS at 13:46

## 2020-02-12 RX ADMIN — LORAZEPAM 0.5 MG: 0.5 TABLET ORAL at 21:44

## 2020-02-12 RX ADMIN — HYDRALAZINE HYDROCHLORIDE 100 MG: 25 TABLET, FILM COATED ORAL at 15:23

## 2020-02-12 RX ADMIN — MORPHINE SULFATE 1 MG: 2 INJECTION, SOLUTION INTRAMUSCULAR; INTRAVENOUS at 03:49

## 2020-02-12 RX ADMIN — METOPROLOL TARTRATE 100 MG: 25 TABLET ORAL at 09:48

## 2020-02-12 RX ADMIN — AMITRIPTYLINE HYDROCHLORIDE 10 MG: 10 TABLET, FILM COATED ORAL at 21:44

## 2020-02-12 RX ADMIN — INSULIN LISPRO 6 UNITS: 100 INJECTION, SOLUTION INTRAVENOUS; SUBCUTANEOUS at 10:28

## 2020-02-12 RX ADMIN — EPOETIN ALFA-EPBX 8000 UNITS: 4000 INJECTION, SOLUTION INTRAVENOUS; SUBCUTANEOUS at 10:28

## 2020-02-12 RX ADMIN — OXYCODONE HYDROCHLORIDE AND ACETAMINOPHEN 1 TABLET: 5; 325 TABLET ORAL at 21:45

## 2020-02-12 RX ADMIN — VANCOMYCIN HYDROCHLORIDE 1000 MG: 1 INJECTION, POWDER, LYOPHILIZED, FOR SOLUTION INTRAVENOUS at 10:28

## 2020-02-12 RX ADMIN — CLONIDINE HYDROCHLORIDE 0.3 MG: 0.2 TABLET ORAL at 15:23

## 2020-02-12 RX ADMIN — CLONIDINE HYDROCHLORIDE 0.3 MG: 0.2 TABLET ORAL at 21:44

## 2020-02-12 RX ADMIN — HEPARIN SODIUM 5000 UNITS: 5000 INJECTION, SOLUTION INTRAVENOUS; SUBCUTANEOUS at 21:53

## 2020-02-12 RX ADMIN — MUPIROCIN: 20 OINTMENT TOPICAL at 21:45

## 2020-02-12 RX ADMIN — INSULIN LISPRO 2 UNITS: 100 INJECTION, SOLUTION INTRAVENOUS; SUBCUTANEOUS at 21:46

## 2020-02-12 RX ADMIN — FENTANYL CITRATE 50 MCG: 50 INJECTION, SOLUTION INTRAMUSCULAR; INTRAVENOUS at 13:45

## 2020-02-12 RX ADMIN — ATORVASTATIN CALCIUM 40 MG: 40 TABLET, FILM COATED ORAL at 21:44

## 2020-02-12 RX ADMIN — Medication 10 ML: at 21:54

## 2020-02-12 RX ADMIN — TRAMADOL HYDROCHLORIDE 50 MG: 50 TABLET, FILM COATED ORAL at 01:16

## 2020-02-12 RX ADMIN — BUMETANIDE 2 MG: 0.25 INJECTION INTRAMUSCULAR; INTRAVENOUS at 09:49

## 2020-02-12 RX ADMIN — TRAMADOL HYDROCHLORIDE 50 MG: 50 TABLET, FILM COATED ORAL at 15:28

## 2020-02-12 RX ADMIN — FLUDROCORTISONE ACETATE 0.1 MG: 0.1 TABLET ORAL at 09:48

## 2020-02-12 ASSESSMENT — PAIN DESCRIPTION - PAIN TYPE
TYPE: SURGICAL PAIN

## 2020-02-12 ASSESSMENT — PAIN SCALES - GENERAL
PAINLEVEL_OUTOF10: 0
PAINLEVEL_OUTOF10: 10
PAINLEVEL_OUTOF10: 0
PAINLEVEL_OUTOF10: 0
PAINLEVEL_OUTOF10: 8
PAINLEVEL_OUTOF10: 8
PAINLEVEL_OUTOF10: 9
PAINLEVEL_OUTOF10: 8
PAINLEVEL_OUTOF10: 7
PAINLEVEL_OUTOF10: 8

## 2020-02-12 ASSESSMENT — PAIN DESCRIPTION - ORIENTATION
ORIENTATION: RIGHT

## 2020-02-12 ASSESSMENT — PAIN DESCRIPTION - LOCATION
LOCATION: HAND

## 2020-02-12 ASSESSMENT — PAIN DESCRIPTION - DESCRIPTORS
DESCRIPTORS: ACHING

## 2020-02-12 ASSESSMENT — PAIN DESCRIPTION - FREQUENCY: FREQUENCY: INTERMITTENT

## 2020-02-12 ASSESSMENT — PAIN DESCRIPTION - ONSET: ONSET: ON-GOING

## 2020-02-12 NOTE — CARE COORDINATION
CM note: Per QFR patient is for a tunneled HD cath today, no HD orders at present time. Will follow for OP HD needs.

## 2020-02-12 NOTE — PROGRESS NOTES
[address] [place], [place] [address]ist   Progress Note    Admitting Date and Time: 2/4/2020  1:35 AM  Admit Dx: Altered mental status [R41.82]    Subjective:    Seen and examined  ID also at bedside  Patient reports throbbing pain to right hand I&D site    ROS: denies fever, chills, cp, sob, n/v, HA unless stated above.      vancomycin  1,000 mg Intravenous Once    lidocaine  20 mL Intradermal Once    insulin lispro  0-6 Units Subcutaneous TID WC    insulin lispro  0-3 Units Subcutaneous Nightly    fludrocortisone  0.1 mg Oral Daily    mupirocin   Topical BID    bumetanide  2 mg Intravenous BID    ciprofloxacin  250 mg Oral Daily    vitamin D  50,000 Units Oral Weekly    epoetin nena-epbx  8,000 Units Subcutaneous Once per day on Mon Wed Fri    sodium chloride flush  10 mL Intravenous 2 times per day    heparin (porcine)  5,000 Units Subcutaneous 3 times per day    amitriptyline  10 mg Oral Nightly    aspirin  81 mg Oral Daily    atorvastatin  40 mg Oral Nightly    cloNIDine  0.3 mg Oral TID    diltiazem  240 mg Oral Daily    hydrALAZINE  100 mg Oral 3 times per day    metoprolol  100 mg Oral BID     LORazepam, 0.5 mg, BID PRN  glucose, 15 g, PRN  dextrose, 12.5 g, PRN  glucagon (rDNA), 1 mg, PRN  dextrose, 100 mL/hr, PRN  sodium chloride flush, 10 mL, PRN  magnesium hydroxide, 30 mL, Daily PRN  ondansetron, 4 mg, Q6H PRN  acetaminophen, 650 mg, Q4H PRN  hydrALAZINE, 10 mg, Q4H PRN  labetalol, 10 mg, Q4H PRN  traMADol, 50 mg, Q6H PRN         Objective:    /67   Pulse 87   Temp 98.4 °F (36.9 °C) (Oral)   Resp 18   Ht 5' 4\" (1.626 m)   Wt 171 lb 1.6 oz (77.6 kg)   LMP 11/16/2019 (LMP Unknown)   SpO2 96%   BMI 29.37 kg/m²   General Appearance: alert and oriented to person, place and time and in no acute distress  Skin: warm and dry  Head: normocephalic and atraumatic  Eyes: pupils equal, round, and reactive to light, extraocular eye movements intact, conjunctivae normal  Neck: neck supple and non tender without mass   Pulmonary/Chest: diminished bilaterally- no wheezes, rales or rhonchi, no respiratory distress  Cardiovascular: normal rate, normal S1 and S2   Abdomen: soft, non-tender, ascites, normal bowel sounds, no masses or organomegaly  Extremities: no cyanosis, no clubbing and 3+ bilateral lower extremity edema, right hand erythema and edema, incision site  Neurologic: no cranial nerve deficit and speech normal      Recent Labs     02/11/20  1320 02/11/20  1705 02/11/20  1737 02/12/20  0530   * 134  --  139   K 6.2* 5.9* 4.9 4.3   CL 97* 98  --  101   CO2 25 25  --  26   BUN 61* 63*  --  62*   CREATININE 3.9* 4.1*  --  3.9*   GLUCOSE 603* 453*  --  203*   CALCIUM 8.1* 8.4*  --  8.1*       Recent Labs     02/10/20  0615 02/11/20  0610 02/12/20  0530   WBC 6.6 6.3 6.2   RBC 2.99* 2.97* 2.85*   HGB 8.8* 8.7* 8.4*   HCT 27.9* 28.5* 27.2*   MCV 93.3 96.0 95.4   MCH 29.4 29.3 29.5   MCHC 31.5* 30.5* 30.9*   RDW 15.7* 16.1* 15.9*    343 351   MPV 10.3 10.2 10.1         Radiology:   XR HAND RIGHT (MIN 3 VIEWS)   Final Result   No acute findings. US DUP UPPER EXTREMITY RIGHT VENOUS   Final Result   1. No evidence of acute venous thrombosis right upper extremity. US DUP LOWER EXTREMITIES BILATERAL VENOUS   Final Result   No evidence of bilateral lower extremity deep venous   thrombus from common femoral vein to proximal calf. CT Head WO Contrast   Final Result   1. No acute intracranial abnormality, without significant change in   appearance of intracranial contents since 1/22/2020.   2. Sphenoid, bilateral ethmoid, and right maxillary sinus mucosal   disease, greatest involving the right maxillary sinus            XR CHEST PORTABLE   Final Result   1. Findings most likely representing CHF versus atypical infectious   process.        IR INTERVENTIONAL RADIOLOGY PROCEDURE REQUEST    (Results Pending)       Assessment:  Active Problems:    Hypertension    Other 4)  9. Bilateral lower extremity edema:  Ultrasound negative for DVT  10. Right hand cellulitis: s/p bedside I&D  02/11. ID on case. Continue cipro, vanc  11. Vitamin D deficiency:  Continue Vitamin D. 12. Anemia:  Continue MARIA TERESA  13. Hyperuricemia:  Defer treatment to Nephrology as NSAIDs and colchicine cannot be used due to CKD. 14.  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           Electronically signed by HEBER Rosario CNP on 2/12/2020 at 9:11 AM

## 2020-02-12 NOTE — PROGRESS NOTES
Using ultrasound and fluoroscopic guidance, 15.5 F 24 cm right internal jugular tunneled dialysis catheter placed without immediate symptomatic complications. Distal catheter tip in right atrium. Full report to follow.

## 2020-02-12 NOTE — PROGRESS NOTES
Physical Therapy                      PHYSICAL THERAPY TREATMENT NOTE  2020  Room #: 8961/2579-36  Chance Vasquez   [id number]  1990   Referring Doctor:   Linda Partida MD   Diagnosis/Problem list:  Altered mental status [R41.82]       Patient Active Problem List   Diagnosis    High-risk pregnancy    Previous stillbirth or demise, antepartum    Non compliance w medication regimen    Dyspnea on exertion    Tobacco smoking complicating pregnancy    Drug use complicating pregnancy in third trimester    MTHFR mutation ([facility]    Poorly controlled type 1 diabetes mellitus ([address]    Diabetes mellitus type 1, uncontrolled ([address]    Previous  delivery affecting pregnancy, antepartum    Oligohydramnios    Kidney stones    Menses painful    Generalized abdominal pain    Opioid abuse, episodic ([address]    Tobacco use    Diabetic ketoacidosis without coma associated with type 1 diabetes mellitus ([address]    Septicemia ([address]    Hypoglycemia    Hypertension    DKA, type 1, not at goal [facility])    Hyponatremia    Other specified diabetes mellitus with other specified complication ([address]    First trimester pregnancy    Acute electrocardiogram changes    Acute kidney injury superimposed on CKD ([address]    Diabetic gastroparesis associated with type 1 diabetes mellitus ([address]    Diarrhea in adult patient    Generalized abdominal pain    Acute kidney injury superimposed on chronic kidney disease ([address]    Acute gastroenteritis    High anion gap metabolic acidosis    Iron deficiency anemia    Headache    Acute respiratory failure with hypoxia (HCC)    Aspiration pneumonia of both lungs (HCC)    Acute cholecystitis    Chest pain, unspecified    Dehydration    Sinus tachycardia    Orthostatic hypotension    Bladder dysfunction    C. difficile colitis    Altered mental status    Acute heart failure with preserved ejection fraction (HCC)    Hypervolemia    Chronic kidney disease    Hypokalemia    Elevated troponin    Cellulitis of right hand    Mild protein-calorie malnutrition ([address]       Tentative placement recommendation: Home Health Physical Therapy 5 days/week  Equipment recommendation: To be determined        Plan of care: Patient will be seen  daily  for therapeutic exercise, functional retraining, endurance activities, balance exercises, family and patient education. AM-PAC Basic Mobility       AM-[facility] Mobility Inpatient   How much difficulty turning over in bed?: None  How much difficulty sitting down on / standing up from a chair with arms?: None  How much difficulty moving from lying on back to sitting on side of bed?: None  How much help from another person moving to and from a bed to a chair?: None  How much help from another person needed to walk in hospital room?: A Little  How much help from another person for climbing 3-5 steps with a railing?: A Little  AM-[facility] Inpatient Mobility Raw Score : 22  AM-PAC Inpatient T-Scale Score : 53.28  Mobility Inpatient CMS 0-100% Score: 20.91  Mobility Inpatient CMS G-Code Modifier : CJ       Nursing cleared patient for PT treatment and patient agreeable to exercises only. Pt states she is to tired to get out of bed this pm.    Precautions:  falls and O2 , new 2 Liters of o2 via nasal cannula   Mentation: alert, cooperative, oriented x 3  and follows directions    PAIN: (measured on a visual analog scale with 0=no pain and 10=excruciating pain) 0/10. FUNCTIONAL ASSESSMENT   Bed Mobility- Supine to sit- Not assessed          Scooting- Not assessed       Sit to supine- Not assessed       Transfers-Sit to stand- Not assessed     Gait:  Not assessed   Steps:  Not assessed          Treatment: Pt. Performed supine exercises. Exercises: Pt. Performed franklyn ankle pumps, quad sets, heel slides, hip abd/add, SAQ, x 15 reps. Verbal/tactile cues for technique.       At end of session, patient in bed with  call light and phone within reach, all lines and tubes intact, nursing notified. Patient continues to benefit from skilled PT to improve functional independence and quality of life. A:  Patient able to perform exercises with AROM and no new c/o. Pt did not want to get out of bed d/t her c/o being to tired. GOALS to be met in 3 days. Bed mobility-  Independent                                         Transfers-Sit to stand-Independent                Gait:  Patient to ambulate 100 feet using no device with Independent                Steps: Patient to go up and down  10  step(s) using 1 rail(s) with  Independent          Increase strength in affected mm groups by 1/3 grade  Increase balance to allow for improvement towards functional goals. Increase endurance to allow for improvement towards functional goals.     Time in: 14:20  Time out: 14:29  9 min.   CPT codes:  Therapeutic exercises (51669)   9 minutes  1 unit(s)      ALESHA MCNAMARA, PTA

## 2020-02-12 NOTE — PROGRESS NOTES
including:  Alpha hemolytic Strep species  Corynebacteria     12/11/2014 Heavy growth  Final       Body Fluid Culture, Sterile   Date Value Ref Range Status   11/01/2019 Growth not present  Final     MRSA Culture Only   Date Value Ref Range Status   10/30/2019 Methicillin resistant Staph aureus not isolated  Final     ASSESSMENT/PLAN:  GPC clusters bacteremia-CONS PROB CONATMINANT  Right hand cellulitis abscess  Gn bacteruria KLEBSIELLA PNEUMONIA  PREVIOUS URINE CX GEMELLA  MRSA COLONIZATION  ckd    -vanco REDOSED    -will redose again today   cipro   START DOXY  Watch lft prelim cx gpc  F/u blood cx ngtd        · Monitor labs    Imaging and labs were reviewed per medical records. The patient was educated about the diagnosis, prognosis, indications, risks and benefits of treatment. An opportunity to ask questions was given to the patient/FAMILY.       Electronically signed by Bettejane Aase, MD on 2/12/2020 at 8:40 AM

## 2020-02-13 LAB
ALBUMIN SERPL-MCNC: 2.6 G/DL (ref 3.5–5.2)
ALP BLD-CCNC: 212 U/L (ref 35–104)
ALT SERPL-CCNC: 141 U/L (ref 0–32)
ANAEROBIC CULTURE: NORMAL
ANION GAP SERPL CALCULATED.3IONS-SCNC: 18 MMOL/L (ref 7–16)
AST SERPL-CCNC: 121 U/L (ref 0–31)
BASOPHILS ABSOLUTE: 0.14 E9/L (ref 0–0.2)
BASOPHILS RELATIVE PERCENT: 1.6 % (ref 0–2)
BILIRUB SERPL-MCNC: <0.2 MG/DL (ref 0–1.2)
BODY FLUID CULTURE, STERILE: ABNORMAL
BUN BLDV-MCNC: 44 MG/DL (ref 6–20)
CALCIUM SERPL-MCNC: 8.1 MG/DL (ref 8.6–10.2)
CHLORIDE BLD-SCNC: 94 MMOL/L (ref 98–107)
CO2: 22 MMOL/L (ref 22–29)
CREAT SERPL-MCNC: 3.3 MG/DL (ref 0.5–1)
CULTURE, BLOOD 2: NORMAL
EOSINOPHILS ABSOLUTE: 0.76 E9/L (ref 0.05–0.5)
EOSINOPHILS RELATIVE PERCENT: 8.5 % (ref 0–6)
GFR AFRICAN AMERICAN: 20
GFR NON-AFRICAN AMERICAN: 20 ML/MIN/1.73
GLUCOSE BLD-MCNC: 574 MG/DL (ref 74–99)
GRAM STAIN RESULT: ABNORMAL
HCT VFR BLD CALC: 27.7 % (ref 34–48)
HEMOGLOBIN: 8.3 G/DL (ref 11.5–15.5)
IMMATURE GRANULOCYTES #: 0.04 E9/L
IMMATURE GRANULOCYTES %: 0.4 % (ref 0–5)
LYMPHOCYTES ABSOLUTE: 1.69 E9/L (ref 1.5–4)
LYMPHOCYTES RELATIVE PERCENT: 19 % (ref 20–42)
MAGNESIUM: 1.7 MG/DL (ref 1.6–2.6)
MCH RBC QN AUTO: 29.6 PG (ref 26–35)
MCHC RBC AUTO-ENTMCNC: 30 % (ref 32–34.5)
MCV RBC AUTO: 98.9 FL (ref 80–99.9)
METER GLUCOSE: 105 MG/DL (ref 74–99)
METER GLUCOSE: 164 MG/DL (ref 74–99)
METER GLUCOSE: 359 MG/DL (ref 74–99)
METER GLUCOSE: 55 MG/DL (ref 74–99)
MONOCYTES ABSOLUTE: 0.69 E9/L (ref 0.1–0.95)
MONOCYTES RELATIVE PERCENT: 7.7 % (ref 2–12)
NEUTROPHILS ABSOLUTE: 5.59 E9/L (ref 1.8–7.3)
NEUTROPHILS RELATIVE PERCENT: 62.8 % (ref 43–80)
ORGANISM: ABNORMAL
PDW BLD-RTO: 16.5 FL (ref 11.5–15)
PLATELET # BLD: 357 E9/L (ref 130–450)
PMV BLD AUTO: 9.9 FL (ref 7–12)
POTASSIUM SERPL-SCNC: 4.5 MMOL/L (ref 3.5–5)
RBC # BLD: 2.8 E12/L (ref 3.5–5.5)
SODIUM BLD-SCNC: 134 MMOL/L (ref 132–146)
TOTAL PROTEIN: 5.8 G/DL (ref 6.4–8.3)
VANCOMYCIN RANDOM: 21 MCG/ML (ref 5–40)
WBC # BLD: 8.9 E9/L (ref 4.5–11.5)

## 2020-02-13 PROCEDURE — 6370000000 HC RX 637 (ALT 250 FOR IP): Performed by: SPECIALIST

## 2020-02-13 PROCEDURE — APPSS30 APP SPLIT SHARED TIME 16-30 MINUTES: Performed by: NURSE PRACTITIONER

## 2020-02-13 PROCEDURE — 83735 ASSAY OF MAGNESIUM: CPT

## 2020-02-13 PROCEDURE — 90935 HEMODIALYSIS ONE EVALUATION: CPT

## 2020-02-13 PROCEDURE — 86706 HEP B SURFACE ANTIBODY: CPT

## 2020-02-13 PROCEDURE — 6360000002 HC RX W HCPCS: Performed by: INTERNAL MEDICINE

## 2020-02-13 PROCEDURE — 36415 COLL VENOUS BLD VENIPUNCTURE: CPT

## 2020-02-13 PROCEDURE — 80053 COMPREHEN METABOLIC PANEL: CPT

## 2020-02-13 PROCEDURE — 80202 ASSAY OF VANCOMYCIN: CPT

## 2020-02-13 PROCEDURE — 6370000000 HC RX 637 (ALT 250 FOR IP): Performed by: INTERNAL MEDICINE

## 2020-02-13 PROCEDURE — 2500000003 HC RX 250 WO HCPCS: Performed by: INTERNAL MEDICINE

## 2020-02-13 PROCEDURE — 85025 COMPLETE CBC W/AUTO DIFF WBC: CPT

## 2020-02-13 PROCEDURE — 1200000000 HC SEMI PRIVATE

## 2020-02-13 PROCEDURE — 6370000000 HC RX 637 (ALT 250 FOR IP): Performed by: NURSE PRACTITIONER

## 2020-02-13 PROCEDURE — 2580000003 HC RX 258: Performed by: INTERNAL MEDICINE

## 2020-02-13 PROCEDURE — 2580000003 HC RX 258: Performed by: NURSE PRACTITIONER

## 2020-02-13 PROCEDURE — 82962 GLUCOSE BLOOD TEST: CPT

## 2020-02-13 PROCEDURE — 99233 SBSQ HOSP IP/OBS HIGH 50: CPT | Performed by: INTERNAL MEDICINE

## 2020-02-13 PROCEDURE — 80074 ACUTE HEPATITIS PANEL: CPT

## 2020-02-13 RX ORDER — INSULIN GLARGINE 100 [IU]/ML
14 INJECTION, SOLUTION SUBCUTANEOUS 2 TIMES DAILY
Status: DISCONTINUED | OUTPATIENT
Start: 2020-02-13 | End: 2020-02-15

## 2020-02-13 RX ORDER — DOXYCYCLINE HYCLATE 100 MG/1
100 CAPSULE ORAL EVERY 12 HOURS SCHEDULED
Status: DISCONTINUED | OUTPATIENT
Start: 2020-02-13 | End: 2020-02-15

## 2020-02-13 RX ORDER — SODIUM CHLORIDE 9 MG/ML
INJECTION, SOLUTION INTRAVENOUS CONTINUOUS
Status: DISCONTINUED | OUTPATIENT
Start: 2020-02-13 | End: 2020-02-15 | Stop reason: HOSPADM

## 2020-02-13 RX ADMIN — CLONIDINE HYDROCHLORIDE 0.3 MG: 0.2 TABLET ORAL at 13:24

## 2020-02-13 RX ADMIN — METOPROLOL TARTRATE 100 MG: 25 TABLET ORAL at 09:06

## 2020-02-13 RX ADMIN — INSULIN LISPRO 6 UNITS: 100 INJECTION, SOLUTION INTRAVENOUS; SUBCUTANEOUS at 07:45

## 2020-02-13 RX ADMIN — MUPIROCIN: 20 OINTMENT TOPICAL at 09:00

## 2020-02-13 RX ADMIN — DOXYCYCLINE HYCLATE 100 MG: 100 CAPSULE ORAL at 10:30

## 2020-02-13 RX ADMIN — HYDRALAZINE HYDROCHLORIDE 100 MG: 25 TABLET, FILM COATED ORAL at 22:29

## 2020-02-13 RX ADMIN — HYDRALAZINE HYDROCHLORIDE 100 MG: 25 TABLET, FILM COATED ORAL at 14:41

## 2020-02-13 RX ADMIN — Medication 10 ML: at 09:08

## 2020-02-13 RX ADMIN — BUMETANIDE 2 MG: 0.25 INJECTION INTRAMUSCULAR; INTRAVENOUS at 09:09

## 2020-02-13 RX ADMIN — HYDRALAZINE HYDROCHLORIDE 100 MG: 25 TABLET, FILM COATED ORAL at 06:09

## 2020-02-13 RX ADMIN — TRAMADOL HYDROCHLORIDE 50 MG: 50 TABLET, FILM COATED ORAL at 21:07

## 2020-02-13 RX ADMIN — LORAZEPAM 0.5 MG: 0.5 TABLET ORAL at 21:07

## 2020-02-13 RX ADMIN — ATORVASTATIN CALCIUM 40 MG: 40 TABLET, FILM COATED ORAL at 21:08

## 2020-02-13 RX ADMIN — AMITRIPTYLINE HYDROCHLORIDE 10 MG: 10 TABLET, FILM COATED ORAL at 21:08

## 2020-02-13 RX ADMIN — DILTIAZEM HYDROCHLORIDE 240 MG: 240 CAPSULE, COATED, EXTENDED RELEASE ORAL at 09:06

## 2020-02-13 RX ADMIN — HYDRALAZINE HYDROCHLORIDE 10 MG: 20 INJECTION INTRAMUSCULAR; INTRAVENOUS at 13:24

## 2020-02-13 RX ADMIN — HEPARIN SODIUM 5000 UNITS: 5000 INJECTION, SOLUTION INTRAVENOUS; SUBCUTANEOUS at 21:10

## 2020-02-13 RX ADMIN — METOPROLOL TARTRATE 100 MG: 25 TABLET ORAL at 21:08

## 2020-02-13 RX ADMIN — INSULIN GLARGINE 14 UNITS: 100 INJECTION, SOLUTION SUBCUTANEOUS at 09:14

## 2020-02-13 RX ADMIN — OXYCODONE HYDROCHLORIDE AND ACETAMINOPHEN 1 TABLET: 5; 325 TABLET ORAL at 09:06

## 2020-02-13 RX ADMIN — INSULIN LISPRO 6 UNITS: 100 INJECTION, SOLUTION INTRAVENOUS; SUBCUTANEOUS at 07:34

## 2020-02-13 RX ADMIN — INSULIN LISPRO 1 UNITS: 100 INJECTION, SOLUTION INTRAVENOUS; SUBCUTANEOUS at 21:11

## 2020-02-13 RX ADMIN — HEPARIN SODIUM 5000 UNITS: 5000 INJECTION, SOLUTION INTRAVENOUS; SUBCUTANEOUS at 06:10

## 2020-02-13 RX ADMIN — DOXYCYCLINE HYCLATE 100 MG: 100 CAPSULE ORAL at 21:08

## 2020-02-13 RX ADMIN — CLONIDINE HYDROCHLORIDE 0.3 MG: 0.2 TABLET ORAL at 09:08

## 2020-02-13 RX ADMIN — INSULIN GLARGINE 14 UNITS: 100 INJECTION, SOLUTION SUBCUTANEOUS at 21:10

## 2020-02-13 RX ADMIN — CLONIDINE HYDROCHLORIDE 0.3 MG: 0.2 TABLET ORAL at 21:08

## 2020-02-13 RX ADMIN — INSULIN LISPRO 12 UNITS: 100 INJECTION, SOLUTION INTRAVENOUS; SUBCUTANEOUS at 11:23

## 2020-02-13 RX ADMIN — ASPIRIN 81 MG 81 MG: 81 TABLET ORAL at 09:06

## 2020-02-13 RX ADMIN — HEPARIN SODIUM 5000 UNITS: 5000 INJECTION, SOLUTION INTRAVENOUS; SUBCUTANEOUS at 14:00

## 2020-02-13 RX ADMIN — BUMETANIDE 2 MG: 0.25 INJECTION INTRAMUSCULAR; INTRAVENOUS at 21:10

## 2020-02-13 RX ADMIN — CIPROFLOXACIN HYDROCHLORIDE 250 MG: 250 TABLET, FILM COATED ORAL at 09:06

## 2020-02-13 RX ADMIN — MUPIROCIN: 20 OINTMENT TOPICAL at 22:29

## 2020-02-13 RX ADMIN — SODIUM CHLORIDE: 9 INJECTION, SOLUTION INTRAVENOUS at 11:46

## 2020-02-13 ASSESSMENT — PAIN SCALES - GENERAL
PAINLEVEL_OUTOF10: 5
PAINLEVEL_OUTOF10: 7
PAINLEVEL_OUTOF10: 0
PAINLEVEL_OUTOF10: 5
PAINLEVEL_OUTOF10: 8
PAINLEVEL_OUTOF10: 0

## 2020-02-13 NOTE — PROGRESS NOTES
Department of Internal Medicine  Nephrology Attending Progress Note        SUBJECTIVE: Called by the nurse stating that her discharge is being held for volume overload      Physical Exam:    VITALS:  /63   Pulse 81   Temp 98.3 °F (36.8 °C) (Oral)   Resp 17   Ht 5' 4\" (1.626 m)   Wt 171 lb 9.6 oz (77.8 kg)   LMP 11/16/2019 (LMP Unknown)   SpO2 95%   BMI 29.46 kg/m²   24HR INTAKE/OUTPUT:      Intake/Output Summary (Last 24 hours) at 2/13/2020 1240  Last data filed at 2/13/2020 0800  Gross per 24 hour   Intake 720 ml   Output 25552 ml   Net -53733 ml           DATA:    CBC:   Lab Results   Component Value Date    WBC 8.9 02/13/2020    RBC 2.80 02/13/2020    HGB 8.3 02/13/2020    HCT 27.7 02/13/2020    MCV 98.9 02/13/2020    MCH 29.6 02/13/2020    MCHC 30.0 02/13/2020    RDW 16.5 02/13/2020     02/13/2020    MPV 9.9 02/13/2020     CMP:    Lab Results   Component Value Date     02/13/2020    K 4.5 02/13/2020    K 4.4 01/23/2020    CL 94 02/13/2020    CO2 22 02/13/2020    BUN 44 02/13/2020    CREATININE 3.3 02/13/2020    GFRAA 20 02/13/2020    LABGLOM 20 02/13/2020    GLUCOSE 574 02/13/2020    GLUCOSE 130 05/18/2012    PROT 5.8 02/13/2020    LABALBU 2.6 02/13/2020    LABALBU 4.1 05/18/2012    CALCIUM 8.1 02/13/2020    BILITOT <0.2 02/13/2020    ALKPHOS 212 02/13/2020     02/13/2020     02/13/2020       IMPRESSION/RECOMMENDATIONS:      Acute kidney injury. CKD stage 4. Metabolic acidosis. Resolved. Anemia of CKD. Edema. Hyperkalemia    Plan:  - Daily dialysis. - Discharge planning.  - Hep panel.  - Discussed with charge nurse.

## 2020-02-13 NOTE — PROGRESS NOTES
Physical Therapy    [id number]    Patient unavailable for physical therapy treatment due to refusal, states she can't get any sleep here.

## 2020-02-13 NOTE — PROGRESS NOTES
[address] [place] [address] [facility]  Progress Note    NAME:Wilton Flores  1990  DATE:20    F/U: uti/right hand pain    SUBJECTIVE:  Chief Complaint   Patient presents with    Loss of Consciousness     S/p I and D  No c/o  Asleep but arousable  S/p HD cath      No FEVERS, CHILLS, nausea, vomiting, diarrhea. Patient is tolerating medications. No reported adverse drug reactions. Review of systems:  As stated above in the chief complaint, otherwise negative.     Medications:  Scheduled Meds:   insulin lispro  12 Units Subcutaneous Once    insulin lispro  0-12 Units Subcutaneous TID WC    insulin lispro  0-6 Units Subcutaneous Nightly    insulin glargine  14 Units Subcutaneous BID    lidocaine  20 mL Intradermal Once    mupirocin   Topical BID    bumetanide  2 mg Intravenous BID    ciprofloxacin  250 mg Oral Daily    vitamin D  50,000 Units Oral Weekly    epoetin nena-epbx  8,000 Units Subcutaneous Once per day on     sodium chloride flush  10 mL Intravenous 2 times per day    heparin (porcine)  5,000 Units Subcutaneous 3 times per day    amitriptyline  10 mg Oral Nightly    aspirin  81 mg Oral Daily    atorvastatin  40 mg Oral Nightly    cloNIDine  0.3 mg Oral TID    diltiazem  240 mg Oral Daily    hydrALAZINE  100 mg Oral 3 times per day    metoprolol  100 mg Oral BID     Continuous Infusions:   sodium chloride      dextrose Stopped (02/10/20 0314)     PRN Meds:oxyCODONE-acetaminophen, LORazepam, glucose, dextrose, glucagon (rDNA), dextrose, sodium chloride flush, magnesium hydroxide, ondansetron, acetaminophen, hydrALAZINE, labetalol, traMADol    OBJECTIVE:  /63   Pulse 81   Temp 98.3 °F (36.8 °C) (Oral)   Resp 17   Ht 5' 4\" (1.626 m)   Wt 171 lb 9.6 oz (77.8 kg)   LMP 2019 (LMP Unknown)   SpO2 95%   BMI 29.46 kg/m²   Temp  Av.2 °F (36.8 °C)  Min: 98 °F (36.7 °C)  Max: 98.3 °F (36.8 °C)  Constitutional:  The patient is awake,

## 2020-02-13 NOTE — PROGRESS NOTES
atraumatic  Eyes: pupils equal, round, and reactive to light, extraocular eye movements intact, conjunctivae normal  Neck: neck supple and non tender without mass   Pulmonary/Chest: diminished bilaterally- no wheezes, rales or rhonchi, no respiratory distress  Cardiovascular: normal rate, normal S1 and S2 , right chest temp dialysis cath  Abdomen: soft, non-tender, ascites, normal bowel sounds, no masses or organomegaly  Extremities: no cyanosis, no clubbing and 3+ bilateral lower extremity edema, right hand erythema and edema, incision site  Neurologic: no cranial nerve deficit and speech normal  Reyes: yellow urine      Recent Labs     02/11/20  1705 02/11/20  1737 02/12/20  0530 02/13/20  0630     --  139 134   K 5.9* 4.9 4.3 4.5   CL 98  --  101 94*   CO2 25  --  26 22   BUN 63*  --  62* 44*   CREATININE 4.1*  --  3.9* 3.3*   GLUCOSE 453*  --  203* 574*   CALCIUM 8.4*  --  8.1* 8.1*       Recent Labs     02/11/20  0610 02/12/20  0530 02/13/20  0630   WBC 6.3 6.2 8.9   RBC 2.97* 2.85* 2.80*   HGB 8.7* 8.4* 8.3*   HCT 28.5* 27.2* 27.7*   MCV 96.0 95.4 98.9   MCH 29.3 29.5 29.6   MCHC 30.5* 30.9* 30.0*   RDW 16.1* 15.9* 16.5*    351 357   MPV 10.2 10.1 9.9         Radiology:   IR FLUORO GUIDED CVA DEVICE PLMT/REPLACE/REMOVAL   Final Result   Technically successful ultrasound and fluoroscopic guided   placement of right internal jugular tunneled dialysis catheter. IR ULTRASOUND GUIDANCE VASCULAR ACCESS   Final Result      XR HAND RIGHT (MIN 3 VIEWS)   Final Result   No acute findings. US DUP UPPER EXTREMITY RIGHT VENOUS   Final Result   1. No evidence of acute venous thrombosis right upper extremity. US DUP LOWER EXTREMITIES BILATERAL VENOUS   Final Result   No evidence of bilateral lower extremity deep venous   thrombus from common femoral vein to proximal calf. CT Head WO Contrast   Final Result   1.  No acute intracranial abnormality, without significant change in slide scale to medium dose. AG 18. Continue to monitor blood sugars closely as she is a brittle diabetic. Start IV fluids, add lantus 14 units BID. Home dose is basaglar 28 units QHS. 7. Hypertension: reasonably controlled. Continue clonidine, hydralazine, cardizem, metoprolol  8. UTI:  Urine culture positive for Gemella mobillorum and Klebsiella pneumoniae. Has been seen by ID, received Vancomycin x 1 dose on 2/8,  2/10. On oral Cipro (day 5)  9. Bilateral lower extremity edema:  Ultrasound negative for DVT, likely due to fluid volume overload  10. Right hand cellulitis: s/p bedside I&D  02/11. ID on case. Continue cipro, vanc. Aspirate with MRSA  11. Vitamin D deficiency:  Continue Vitamin D. 12. Anemia:  Continue MARIA TERESA  13. Hyperuricemia:  Defer treatment to Nephrology as NSAIDs and colchicine cannot be used due to CKD. 14.  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           Electronically signed by Geovani Leach APRN - CNP on 2/13/2020 at 10:21 AM

## 2020-02-13 NOTE — PROGRESS NOTES
P Quality Flow/Interdisciplinary Rounds Progress Note        Quality Flow Rounds held on February 13, 2020    Disciplines Attending:  Bedside Nurse, ,  and Nursing Unit Omar Patricia was admitted on 2/4/2020  1:35 AM    Anticipated Discharge Date:  Expected Discharge Date: 02/08/20    Disposition:    Benedicto Score:  Benedicto Scale Score: 20    Readmission Risk              Risk of Unplanned Readmission:        67           Discussed patient goal for the day, patient clinical progression, and barriers to discharge.   The following Goal(s) of the Day/Commitment(s) have been identified:  HD line inserted yersterday      Steph Nguyen  February 13, 2020

## 2020-02-13 NOTE — CARE COORDINATION
CM note: Spoke with patient re:OP HD, patient prefers to go to facility closest to her residence, [facility] [address] [place] and would prefer a M/W/F afternoon chair time. Referral given to [facility], will await final chair time. Hepatitis panel to be drawn today, 2nd HD ordered for today and 3rd session for tomorrow, 2/14/2020.

## 2020-02-14 LAB
ALBUMIN SERPL-MCNC: 2.6 G/DL (ref 3.5–5.2)
ALP BLD-CCNC: 188 U/L (ref 35–104)
ALT SERPL-CCNC: 110 U/L (ref 0–32)
ANION GAP SERPL CALCULATED.3IONS-SCNC: 8 MMOL/L (ref 7–16)
AST SERPL-CCNC: 94 U/L (ref 0–31)
BASOPHILS ABSOLUTE: 0.13 E9/L (ref 0–0.2)
BASOPHILS RELATIVE PERCENT: 1.6 % (ref 0–2)
BILIRUB SERPL-MCNC: <0.2 MG/DL (ref 0–1.2)
BLOOD CULTURE, ROUTINE: NORMAL
BUN BLDV-MCNC: 27 MG/DL (ref 6–20)
CALCIUM SERPL-MCNC: 8 MG/DL (ref 8.6–10.2)
CHLORIDE BLD-SCNC: 106 MMOL/L (ref 98–107)
CO2: 30 MMOL/L (ref 22–29)
CREAT SERPL-MCNC: 2.6 MG/DL (ref 0.5–1)
CULTURE, BLOOD 2: NORMAL
EOSINOPHILS ABSOLUTE: 0.84 E9/L (ref 0.05–0.5)
EOSINOPHILS RELATIVE PERCENT: 10.1 % (ref 0–6)
GFR AFRICAN AMERICAN: 27
GFR NON-AFRICAN AMERICAN: 27 ML/MIN/1.73
GLUCOSE BLD-MCNC: 40 MG/DL (ref 74–99)
HAV IGM SER IA-ACNC: NORMAL
HBV SURFACE AB TITR SER: NORMAL {TITER}
HCT VFR BLD CALC: 27.4 % (ref 34–48)
HEMOGLOBIN: 8.4 G/DL (ref 11.5–15.5)
HEPATITIS B CORE IGM ANTIBODY: NORMAL
HEPATITIS B SURFACE ANTIGEN INTERPRETATION: NORMAL
HEPATITIS C ANTIBODY INTERPRETATION: NORMAL
IMMATURE GRANULOCYTES #: 0.04 E9/L
IMMATURE GRANULOCYTES %: 0.5 % (ref 0–5)
LYMPHOCYTES ABSOLUTE: 2.82 E9/L (ref 1.5–4)
LYMPHOCYTES RELATIVE PERCENT: 33.9 % (ref 20–42)
MAGNESIUM: 1.8 MG/DL (ref 1.6–2.6)
MCH RBC QN AUTO: 29.5 PG (ref 26–35)
MCHC RBC AUTO-ENTMCNC: 30.7 % (ref 32–34.5)
MCV RBC AUTO: 96.1 FL (ref 80–99.9)
METER GLUCOSE: 171 MG/DL (ref 74–99)
METER GLUCOSE: 40 MG/DL (ref 74–99)
METER GLUCOSE: 79 MG/DL (ref 74–99)
METER GLUCOSE: 84 MG/DL (ref 74–99)
METER GLUCOSE: 86 MG/DL (ref 74–99)
MONOCYTES ABSOLUTE: 0.77 E9/L (ref 0.1–0.95)
MONOCYTES RELATIVE PERCENT: 9.3 % (ref 2–12)
NEUTROPHILS ABSOLUTE: 3.71 E9/L (ref 1.8–7.3)
NEUTROPHILS RELATIVE PERCENT: 44.6 % (ref 43–80)
PDW BLD-RTO: 16.5 FL (ref 11.5–15)
PLATELET # BLD: 363 E9/L (ref 130–450)
PMV BLD AUTO: 9.3 FL (ref 7–12)
POTASSIUM SERPL-SCNC: 4.1 MMOL/L (ref 3.5–5)
RBC # BLD: 2.85 E12/L (ref 3.5–5.5)
SODIUM BLD-SCNC: 144 MMOL/L (ref 132–146)
TOTAL PROTEIN: 5.5 G/DL (ref 6.4–8.3)
WBC # BLD: 8.3 E9/L (ref 4.5–11.5)

## 2020-02-14 PROCEDURE — 1200000000 HC SEMI PRIVATE

## 2020-02-14 PROCEDURE — 85025 COMPLETE CBC W/AUTO DIFF WBC: CPT

## 2020-02-14 PROCEDURE — 90935 HEMODIALYSIS ONE EVALUATION: CPT

## 2020-02-14 PROCEDURE — 2580000003 HC RX 258: Performed by: NURSE PRACTITIONER

## 2020-02-14 PROCEDURE — 36415 COLL VENOUS BLD VENIPUNCTURE: CPT

## 2020-02-14 PROCEDURE — 82962 GLUCOSE BLOOD TEST: CPT

## 2020-02-14 PROCEDURE — 2500000003 HC RX 250 WO HCPCS: Performed by: INTERNAL MEDICINE

## 2020-02-14 PROCEDURE — 99232 SBSQ HOSP IP/OBS MODERATE 35: CPT | Performed by: INTERNAL MEDICINE

## 2020-02-14 PROCEDURE — 2580000003 HC RX 258: Performed by: INTERNAL MEDICINE

## 2020-02-14 PROCEDURE — 6370000000 HC RX 637 (ALT 250 FOR IP): Performed by: NURSE PRACTITIONER

## 2020-02-14 PROCEDURE — 6370000000 HC RX 637 (ALT 250 FOR IP): Performed by: SPECIALIST

## 2020-02-14 PROCEDURE — 2700000000 HC OXYGEN THERAPY PER DAY

## 2020-02-14 PROCEDURE — 6360000002 HC RX W HCPCS: Performed by: INTERNAL MEDICINE

## 2020-02-14 PROCEDURE — 83735 ASSAY OF MAGNESIUM: CPT

## 2020-02-14 PROCEDURE — 6370000000 HC RX 637 (ALT 250 FOR IP): Performed by: INTERNAL MEDICINE

## 2020-02-14 PROCEDURE — 6360000002 HC RX W HCPCS: Performed by: SPECIALIST

## 2020-02-14 PROCEDURE — 80053 COMPREHEN METABOLIC PANEL: CPT

## 2020-02-14 PROCEDURE — 2580000003 HC RX 258: Performed by: SPECIALIST

## 2020-02-14 RX ADMIN — DILTIAZEM HYDROCHLORIDE 240 MG: 240 CAPSULE, COATED, EXTENDED RELEASE ORAL at 10:46

## 2020-02-14 RX ADMIN — OXYCODONE HYDROCHLORIDE AND ACETAMINOPHEN 1 TABLET: 5; 325 TABLET ORAL at 21:31

## 2020-02-14 RX ADMIN — SODIUM CHLORIDE: 9 INJECTION, SOLUTION INTRAVENOUS at 05:31

## 2020-02-14 RX ADMIN — CLONIDINE HYDROCHLORIDE 0.3 MG: 0.2 TABLET ORAL at 21:31

## 2020-02-14 RX ADMIN — DOXYCYCLINE HYCLATE 100 MG: 100 CAPSULE ORAL at 10:47

## 2020-02-14 RX ADMIN — HYDRALAZINE HYDROCHLORIDE 100 MG: 25 TABLET, FILM COATED ORAL at 05:30

## 2020-02-14 RX ADMIN — AMITRIPTYLINE HYDROCHLORIDE 10 MG: 10 TABLET, FILM COATED ORAL at 21:30

## 2020-02-14 RX ADMIN — HYDRALAZINE HYDROCHLORIDE 100 MG: 25 TABLET, FILM COATED ORAL at 21:33

## 2020-02-14 RX ADMIN — BUMETANIDE 2 MG: 0.25 INJECTION INTRAMUSCULAR; INTRAVENOUS at 10:47

## 2020-02-14 RX ADMIN — METOPROLOL TARTRATE 100 MG: 25 TABLET ORAL at 10:46

## 2020-02-14 RX ADMIN — Medication 10 ML: at 21:34

## 2020-02-14 RX ADMIN — BUMETANIDE 2 MG: 0.25 INJECTION INTRAMUSCULAR; INTRAVENOUS at 21:33

## 2020-02-14 RX ADMIN — HEPARIN SODIUM 5000 UNITS: 5000 INJECTION, SOLUTION INTRAVENOUS; SUBCUTANEOUS at 21:39

## 2020-02-14 RX ADMIN — DOXYCYCLINE HYCLATE 100 MG: 100 CAPSULE ORAL at 21:32

## 2020-02-14 RX ADMIN — ASPIRIN 81 MG 81 MG: 81 TABLET ORAL at 10:47

## 2020-02-14 RX ADMIN — METOPROLOL TARTRATE 100 MG: 25 TABLET ORAL at 21:32

## 2020-02-14 RX ADMIN — ATORVASTATIN CALCIUM 40 MG: 40 TABLET, FILM COATED ORAL at 21:32

## 2020-02-14 RX ADMIN — MUPIROCIN: 20 OINTMENT TOPICAL at 21:35

## 2020-02-14 RX ADMIN — HEPARIN SODIUM 5000 UNITS: 5000 INJECTION, SOLUTION INTRAVENOUS; SUBCUTANEOUS at 05:30

## 2020-02-14 RX ADMIN — INSULIN LISPRO 1 UNITS: 100 INJECTION, SOLUTION INTRAVENOUS; SUBCUTANEOUS at 21:37

## 2020-02-14 RX ADMIN — VANCOMYCIN HYDROCHLORIDE 1000 MG: 1 INJECTION, POWDER, LYOPHILIZED, FOR SOLUTION INTRAVENOUS at 14:09

## 2020-02-14 RX ADMIN — INSULIN GLARGINE 14 UNITS: 100 INJECTION, SOLUTION SUBCUTANEOUS at 21:38

## 2020-02-14 RX ADMIN — CLONIDINE HYDROCHLORIDE 0.3 MG: 0.2 TABLET ORAL at 10:45

## 2020-02-14 ASSESSMENT — PAIN DESCRIPTION - ORIENTATION: ORIENTATION: RIGHT

## 2020-02-14 ASSESSMENT — PAIN SCALES - GENERAL
PAINLEVEL_OUTOF10: 8
PAINLEVEL_OUTOF10: 0

## 2020-02-14 ASSESSMENT — PAIN DESCRIPTION - PROGRESSION: CLINICAL_PROGRESSION: NOT CHANGED

## 2020-02-14 ASSESSMENT — PAIN - FUNCTIONAL ASSESSMENT: PAIN_FUNCTIONAL_ASSESSMENT: ACTIVITIES ARE NOT PREVENTED

## 2020-02-14 ASSESSMENT — PAIN DESCRIPTION - LOCATION: LOCATION: HAND

## 2020-02-14 ASSESSMENT — PAIN DESCRIPTION - DESCRIPTORS: DESCRIPTORS: ACHING

## 2020-02-14 ASSESSMENT — PAIN DESCRIPTION - PAIN TYPE: TYPE: ACUTE PAIN

## 2020-02-14 ASSESSMENT — PAIN DESCRIPTION - FREQUENCY: FREQUENCY: INTERMITTENT

## 2020-02-14 ASSESSMENT — PAIN DESCRIPTION - ONSET: ONSET: ON-GOING

## 2020-02-14 NOTE — FLOWSHEET NOTE
02/13/20 1830   Vital Signs   BP (!) 146/85   Temp 98.7 °F (37.1 °C)   Pulse 72   Resp 14   Weight Method Estimated; Bed scale   Pain Assessment   Pain Assessment 0-10   Pain Level 0   Post-Hemodialysis Assessment   Post-Treatment Procedures Blood returned;Catheter capped, clamped and heparinized x 2 ports   Machine Disinfection Process Acid/Vinegar Clean;Exterior Machine Disinfection; Heat Disinfect   Rinseback Volume (ml) 300 ml   Dialyzer Clearance Lightly streaked   Duration of Treatment (minutes) 150 minutes   Hemodialysis Output (ml) 2300 ml   NET Removed (ml) 2000 ml   Tolerated Treatment Good   Patient Response to Treatment Patient tolerated HD well without issue. NET UF 2000ml. Bilateral Breath Sounds Clear   Edema Right upper extremity; Left lower extremity   RUE Edema +1   LUE Edema +1   RLE Edema +2;Non-pitting   LLE Edema +2;Non-pitting

## 2020-02-14 NOTE — FLOWSHEET NOTE
02/14/20 1514   Vital Signs   BP (!) 167/96   Temp 98.1 °F (36.7 °C)   Pulse 84   Resp 16   Post-Hemodialysis Assessment   Post-Treatment Procedures Blood returned;Catheter capped, clamped and heparinized x 2 ports   Machine Disinfection Process Machine Absence of Bleach Machine; Exterior Machine Disinfection   Rinseback Volume (ml) 300 ml   Total Liters Processed (l/min) 43.9 l/min   Dialyzer Clearance Lightly streaked   Duration of Treatment (minutes) 150 minutes   Hemodialysis Intake (ml) 500 ml   Hemodialysis Output (ml) 2500 ml   NET Removed (ml) 2000 ml   Tolerated Treatment Good   Patient Response to Treatment tolerated tx well vss no complaints dressing changed cath closed with heparin    Bilateral Breath Sounds Clear

## 2020-02-14 NOTE — PROGRESS NOTES
No Physical Therapy    [id number]    Patient unavailable for physical therapy treatment due to dialysis. Hernando Pablo  \[facility] [place] [facility]\""  LIC # [id number]

## 2020-02-14 NOTE — PROGRESS NOTES
Department of Internal Medicine  Nephrology Attending Progress Note        SUBJECTIVE: Called by the nurse stating that her discharge is being held for volume overload      Physical Exam:    VITALS:  /69   Pulse 80   Temp 98.1 °F (36.7 °C) (Oral)   Resp 16   Ht 5' 4\" (1.626 m)   Wt 170 lb 1.6 oz (77.2 kg)   LMP 11/16/2019 (LMP Unknown)   SpO2 98%   BMI 29.20 kg/m²   24HR INTAKE/OUTPUT:      Intake/Output Summary (Last 24 hours) at 2/14/2020 0819  Last data filed at 2/14/2020 0555  Gross per 24 hour   Intake 2080 ml   Output 3450 ml   Net -1370 ml           DATA:    CBC:   Lab Results   Component Value Date    WBC 8.3 02/14/2020    RBC 2.85 02/14/2020    HGB 8.4 02/14/2020    HCT 27.4 02/14/2020    MCV 96.1 02/14/2020    MCH 29.5 02/14/2020    MCHC 30.7 02/14/2020    RDW 16.5 02/14/2020     02/14/2020    MPV 9.3 02/14/2020     CMP:    Lab Results   Component Value Date     02/14/2020    K 4.1 02/14/2020    K 4.4 01/23/2020     02/14/2020    CO2 30 02/14/2020    BUN 27 02/14/2020    CREATININE 2.6 02/14/2020    GFRAA 27 02/14/2020    LABGLOM 27 02/14/2020    GLUCOSE 40 02/14/2020    GLUCOSE 130 05/18/2012    PROT 5.5 02/14/2020    LABALBU 2.6 02/14/2020    LABALBU 4.1 05/18/2012    CALCIUM 8.0 02/14/2020    BILITOT <0.2 02/14/2020    ALKPHOS 188 02/14/2020    AST 94 02/14/2020     02/14/2020       IMPRESSION/RECOMMENDATIONS:      Acute kidney injury. CKD stage 4. Metabolic acidosis. Resolved. Anemia of CKD. Edema. Hyperkalemia    Plan:  - Daily dialysis. - Now switch to tree times a week dialysis. - Renal stand point OK to discharge. - Schedule FU in office once a month.

## 2020-02-14 NOTE — PROGRESS NOTES
[address] [place] [address] [facility]  Progress Note    NAME:Wilton Flores  1990  DATE:02/14/20    F/U: uti/right hand pain    SUBJECTIVE:  Chief Complaint   Patient presents with    Loss of Consciousness     S/p I and D right hand has postop dressong still? No c/o  Asleep but arousable  S/p HD cath   On hemo   No urianry complaints  Feels better     No FEVERS, CHILLS, nausea, vomiting, diarrhea. Patient is tolerating medications. No reported adverse drug reactions. Review of systems:  As stated above in the chief complaint, otherwise negative.     Medications:  Scheduled Meds:   vancomycin  1,000 mg Intravenous Once in dialysis    insulin lispro  0-12 Units Subcutaneous TID WC    insulin lispro  0-6 Units Subcutaneous Nightly    insulin glargine  14 Units Subcutaneous BID    doxycycline hyclate  100 mg Oral 2 times per day    lidocaine  20 mL Intradermal Once    mupirocin   Topical BID    bumetanide  2 mg Intravenous BID    ciprofloxacin  250 mg Oral Daily    vitamin D  50,000 Units Oral Weekly    epoetin nena-epbx  8,000 Units Subcutaneous Once per day on Mon Wed Fri    sodium chloride flush  10 mL Intravenous 2 times per day    heparin (porcine)  5,000 Units Subcutaneous 3 times per day    amitriptyline  10 mg Oral Nightly    aspirin  81 mg Oral Daily    atorvastatin  40 mg Oral Nightly    cloNIDine  0.3 mg Oral TID    diltiazem  240 mg Oral Daily    hydrALAZINE  100 mg Oral 3 times per day    metoprolol  100 mg Oral BID     Continuous Infusions:   sodium chloride 100 mL/hr at 02/14/20 0531    dextrose Stopped (02/10/20 0314)     PRN Meds:oxyCODONE-acetaminophen, LORazepam, glucose, dextrose, glucagon (rDNA), dextrose, sodium chloride flush, magnesium hydroxide, ondansetron, acetaminophen, hydrALAZINE, labetalol, traMADol    OBJECTIVE:  BP (!) 168/85   Pulse 84   Temp 98 °F (36.7 °C)   Resp 16   Ht 5' 4\" (1.626 m)   Wt 170 lb 1.6 oz (77.2 kg)   LMP 2019 (LMP Unknown)   SpO2 98%   BMI 29.20 kg/m²   Temp  Av.5 °F (36.9 °C)  Min: 98 °F (36.7 °C)  Max: 98.9 °F (37.2 °C)  Constitutional:  The patient is awake, alert, and oriented. Skin:    Warm and dry. No rashes were noted. HEENT:   Round and reactive pupils. AT/NC   Chest:   No use of accessory muscles to breathe. Symmetrical expansion. Cardiovascular:  S1 and S2 are rhythmic and regular. No murmurs appreciated. Abdomen:   Positive bowel sounds to auscultation. Benign to palpation. Extremities:   No clubbing, no cyanosis, dec edema.  right hand dressed  CNS    AAxO   Lines: piv  HD cath   Reyes yellow clear    Radiology:  Laboratory and Tests Review:  Lab Results   Component Value Date    WBC 8.3 2020    WBC 8.9 2020    WBC 6.2 2020    HGB 8.4 (L) 2020    HCT 27.4 (L) 2020    MCV 96.1 2020     2020     No results found for: [facility]  Lab Results   Component Value Date     (H) 2020    AST 94 (H) 2020    ALKPHOS 188 (H) 2020    BILITOT <0.2 2020     Lab Results   Component Value Date     2020    K 4.1 2020    K 4.4 2020     2020    CO2 30 2020    BUN 27 2020    CREATININE 2.6 2020    CREATININE 3.3 2020    CREATININE 3.9 2020    GFRAA 27 2020    LABGLOM 27 2020    GLUCOSE 40 2020    GLUCOSE 130 2012    PROT 5.5 2020    LABALBU 2.6 2020    LABALBU 4.1 2012    CALCIUM 8.0 2020    BILITOT <0.2 2020    ALKPHOS 188 2020    AST 94 2020     2020     Lab Results   Component Value Date    CRP 43.1 (H) 2019    CRP 0.3 10/02/2019    CRP 0.4 2017     Lab Results   Component Value Date    SEDRATE 135 (H) 2019    SEDRATE 14 2017       Microbiology:   Lab Results   Component Value Date    BLOODCULT2 24 Hours- no growth 2020    BLOODCULT2 5 Days- no growth 02/08/2020    BLOODCULT2 5 Days- no growth 10/30/2019    ORG Staphylococcus aureus 02/11/2020    ORG MRSA nasal colonization 02/08/2020    ORG Coagulase Negative Staphylococci 02/08/2020     WOUND/ABSCESS   Date Value Ref Range Status   12/11/2014 (A)  Final    Mixed yulsia also isolated, including:  Alpha hemolytic Strep species  Corynebacteria     12/11/2014 Heavy growth  Final       Body Fluid Culture, Sterile   Date Value Ref Range Status   02/11/2020   Final    Moderate growth  Methicillin resistant Staph aureus isolated. Most Methicillin  resistant Staphylococcus are usually resistant to multiple  antibiotics including other B-Lactams, Aminoglycosides,  Macrolides, Clindamycin and Tetracycline. Contact isolation  is indicated. MRSA Culture Only   Date Value Ref Range Status   10/30/2019 Methicillin resistant Staph aureus not isolated  Final     ASSESSMENT/PLAN:  GPC clusters bacteremia-CONS PROB CONATMINANT  Right hand cellulitis abscess MRSA vanco by levels with doxy  Gn bacteruria KLEBSIELLA PNEUMONIA on cipro  PREVIOUS URINE CX GEMELLA  MRSA COLONIZATION  ckd for hemo    -vanco REDOSED 2/12 will redose today after hemo     On doxy      cipro can stop on d/c     F/u blood cx ngtd        · Monitor labs    Imaging and labs were reviewed per medical records. The patient was educated about the diagnosis, prognosis, indications, risks and benefits of treatment. An opportunity to ask questions was given to the patient/FAMILY.       Electronically signed by Jazzmine Keys MD on 2/14/2020 at 2:37 PM

## 2020-02-15 VITALS
DIASTOLIC BLOOD PRESSURE: 95 MMHG | HEART RATE: 97 BPM | WEIGHT: 157.63 LBS | OXYGEN SATURATION: 98 % | TEMPERATURE: 97.8 F | SYSTOLIC BLOOD PRESSURE: 162 MMHG | BODY MASS INDEX: 26.91 KG/M2 | HEIGHT: 64 IN | RESPIRATION RATE: 18 BRPM

## 2020-02-15 LAB
ALBUMIN SERPL-MCNC: 2.5 G/DL (ref 3.5–5.2)
ALP BLD-CCNC: 168 U/L (ref 35–104)
ALT SERPL-CCNC: 89 U/L (ref 0–32)
ANION GAP SERPL CALCULATED.3IONS-SCNC: 7 MMOL/L (ref 7–16)
AST SERPL-CCNC: 73 U/L (ref 0–31)
BASOPHILS ABSOLUTE: 0.1 E9/L (ref 0–0.2)
BASOPHILS RELATIVE PERCENT: 1.4 % (ref 0–2)
BILIRUB SERPL-MCNC: <0.2 MG/DL (ref 0–1.2)
BUN BLDV-MCNC: 20 MG/DL (ref 6–20)
CALCIUM SERPL-MCNC: 8.5 MG/DL (ref 8.6–10.2)
CHLORIDE BLD-SCNC: 104 MMOL/L (ref 98–107)
CO2: 31 MMOL/L (ref 22–29)
CREAT SERPL-MCNC: 2.3 MG/DL (ref 0.5–1)
EOSINOPHILS ABSOLUTE: 0.67 E9/L (ref 0.05–0.5)
EOSINOPHILS RELATIVE PERCENT: 9.7 % (ref 0–6)
GFR AFRICAN AMERICAN: 31
GFR NON-AFRICAN AMERICAN: 31 ML/MIN/1.73
GLUCOSE BLD-MCNC: 58 MG/DL (ref 74–99)
HCT VFR BLD CALC: 27.9 % (ref 34–48)
HEMOGLOBIN: 8.5 G/DL (ref 11.5–15.5)
IMMATURE GRANULOCYTES #: 0.03 E9/L
IMMATURE GRANULOCYTES %: 0.4 % (ref 0–5)
LYMPHOCYTES ABSOLUTE: 2.18 E9/L (ref 1.5–4)
LYMPHOCYTES RELATIVE PERCENT: 31.6 % (ref 20–42)
MAGNESIUM: 1.7 MG/DL (ref 1.6–2.6)
MCH RBC QN AUTO: 29.6 PG (ref 26–35)
MCHC RBC AUTO-ENTMCNC: 30.5 % (ref 32–34.5)
MCV RBC AUTO: 97.2 FL (ref 80–99.9)
METER GLUCOSE: 114 MG/DL (ref 74–99)
METER GLUCOSE: 149 MG/DL (ref 74–99)
METER GLUCOSE: 169 MG/DL (ref 74–99)
METER GLUCOSE: 303 MG/DL (ref 74–99)
METER GLUCOSE: 46 MG/DL (ref 74–99)
METER GLUCOSE: 59 MG/DL (ref 74–99)
MONOCYTES ABSOLUTE: 0.58 E9/L (ref 0.1–0.95)
MONOCYTES RELATIVE PERCENT: 8.4 % (ref 2–12)
NEUTROPHILS ABSOLUTE: 3.34 E9/L (ref 1.8–7.3)
NEUTROPHILS RELATIVE PERCENT: 48.5 % (ref 43–80)
PDW BLD-RTO: 16.7 FL (ref 11.5–15)
PLATELET # BLD: 293 E9/L (ref 130–450)
PMV BLD AUTO: 9.1 FL (ref 7–12)
POTASSIUM SERPL-SCNC: 3.9 MMOL/L (ref 3.5–5)
RBC # BLD: 2.87 E12/L (ref 3.5–5.5)
SODIUM BLD-SCNC: 142 MMOL/L (ref 132–146)
TOTAL PROTEIN: 5.6 G/DL (ref 6.4–8.3)
WBC # BLD: 6.9 E9/L (ref 4.5–11.5)

## 2020-02-15 PROCEDURE — 6370000000 HC RX 637 (ALT 250 FOR IP): Performed by: INTERNAL MEDICINE

## 2020-02-15 PROCEDURE — 99239 HOSP IP/OBS DSCHRG MGMT >30: CPT | Performed by: INTERNAL MEDICINE

## 2020-02-15 PROCEDURE — 80053 COMPREHEN METABOLIC PANEL: CPT

## 2020-02-15 PROCEDURE — 83735 ASSAY OF MAGNESIUM: CPT

## 2020-02-15 PROCEDURE — 6370000000 HC RX 637 (ALT 250 FOR IP): Performed by: NURSE PRACTITIONER

## 2020-02-15 PROCEDURE — 6360000002 HC RX W HCPCS: Performed by: INTERNAL MEDICINE

## 2020-02-15 PROCEDURE — 36415 COLL VENOUS BLD VENIPUNCTURE: CPT

## 2020-02-15 PROCEDURE — 2580000003 HC RX 258: Performed by: INTERNAL MEDICINE

## 2020-02-15 PROCEDURE — 82962 GLUCOSE BLOOD TEST: CPT

## 2020-02-15 PROCEDURE — 3E1M39Z IRRIGATION OF PERITONEAL CAVITY USING DIALYSATE, PERCUTANEOUS APPROACH: ICD-10-PCS | Performed by: INTERNAL MEDICINE

## 2020-02-15 PROCEDURE — 2580000003 HC RX 258: Performed by: NURSE PRACTITIONER

## 2020-02-15 PROCEDURE — 6370000000 HC RX 637 (ALT 250 FOR IP): Performed by: SPECIALIST

## 2020-02-15 PROCEDURE — 90945 DIALYSIS ONE EVALUATION: CPT | Performed by: INTERNAL MEDICINE

## 2020-02-15 PROCEDURE — APPSS45 APP SPLIT SHARED TIME 31-45 MINUTES: Performed by: NURSE PRACTITIONER

## 2020-02-15 PROCEDURE — 6360000002 HC RX W HCPCS: Performed by: NURSE PRACTITIONER

## 2020-02-15 PROCEDURE — 85025 COMPLETE CBC W/AUTO DIFF WBC: CPT

## 2020-02-15 PROCEDURE — 2500000003 HC RX 250 WO HCPCS: Performed by: INTERNAL MEDICINE

## 2020-02-15 RX ORDER — HEPARIN SODIUM (PORCINE) LOCK FLUSH IV SOLN 100 UNIT/ML 100 UNIT/ML
100 SOLUTION INTRAVENOUS ONCE
Status: COMPLETED | OUTPATIENT
Start: 2020-02-15 | End: 2020-02-15

## 2020-02-15 RX ORDER — INSULIN GLARGINE 100 [IU]/ML
10 INJECTION, SOLUTION SUBCUTANEOUS 2 TIMES DAILY
Status: DISCONTINUED | OUTPATIENT
Start: 2020-02-15 | End: 2020-02-15 | Stop reason: HOSPADM

## 2020-02-15 RX ORDER — DOXYCYCLINE HYCLATE 100 MG/1
100 CAPSULE ORAL EVERY 12 HOURS SCHEDULED
Status: DISCONTINUED | OUTPATIENT
Start: 2020-02-15 | End: 2020-02-15 | Stop reason: HOSPADM

## 2020-02-15 RX ORDER — OXYCODONE HYDROCHLORIDE AND ACETAMINOPHEN 5; 325 MG/1; MG/1
1 TABLET ORAL EVERY 4 HOURS PRN
Qty: 12 TABLET | Refills: 0 | Status: SHIPPED | OUTPATIENT
Start: 2020-02-15 | End: 2020-02-18

## 2020-02-15 RX ADMIN — LORAZEPAM 0.5 MG: 0.5 TABLET ORAL at 17:17

## 2020-02-15 RX ADMIN — DILTIAZEM HYDROCHLORIDE 240 MG: 240 CAPSULE, COATED, EXTENDED RELEASE ORAL at 10:11

## 2020-02-15 RX ADMIN — ASPIRIN 81 MG 81 MG: 81 TABLET ORAL at 10:11

## 2020-02-15 RX ADMIN — HEPARIN SODIUM 5000 UNITS: 5000 INJECTION, SOLUTION INTRAVENOUS; SUBCUTANEOUS at 06:17

## 2020-02-15 RX ADMIN — ERGOCALCIFEROL 50000 UNITS: 1.25 CAPSULE ORAL at 10:11

## 2020-02-15 RX ADMIN — INSULIN LISPRO 8 UNITS: 100 INJECTION, SOLUTION INTRAVENOUS; SUBCUTANEOUS at 17:19

## 2020-02-15 RX ADMIN — DEXTROSE 50 % IN WATER (D50W) INTRAVENOUS SYRINGE 12.5 G: at 06:32

## 2020-02-15 RX ADMIN — CLONIDINE HYDROCHLORIDE 0.3 MG: 0.2 TABLET ORAL at 17:18

## 2020-02-15 RX ADMIN — CIPROFLOXACIN HYDROCHLORIDE 250 MG: 250 TABLET, FILM COATED ORAL at 10:28

## 2020-02-15 RX ADMIN — Medication 10 ML: at 10:12

## 2020-02-15 RX ADMIN — BUMETANIDE 2 MG: 0.25 INJECTION INTRAMUSCULAR; INTRAVENOUS at 10:11

## 2020-02-15 RX ADMIN — HEPARIN 100 UNITS: 100 SYRINGE at 17:34

## 2020-02-15 RX ADMIN — HYDRALAZINE HYDROCHLORIDE 10 MG: 20 INJECTION INTRAMUSCULAR; INTRAVENOUS at 01:08

## 2020-02-15 RX ADMIN — LORAZEPAM 0.5 MG: 0.5 TABLET ORAL at 01:03

## 2020-02-15 RX ADMIN — HYDRALAZINE HYDROCHLORIDE 100 MG: 25 TABLET, FILM COATED ORAL at 17:17

## 2020-02-15 RX ADMIN — DEXTROSE 50 % IN WATER (D50W) INTRAVENOUS SYRINGE 12.5 G: at 01:38

## 2020-02-15 RX ADMIN — DOXYCYCLINE HYCLATE 100 MG: 100 CAPSULE ORAL at 10:10

## 2020-02-15 RX ADMIN — METOPROLOL TARTRATE 100 MG: 25 TABLET ORAL at 10:10

## 2020-02-15 RX ADMIN — CLONIDINE HYDROCHLORIDE 0.3 MG: 0.2 TABLET ORAL at 10:11

## 2020-02-15 RX ADMIN — CIPROFLOXACIN HYDROCHLORIDE 250 MG: 250 TABLET, FILM COATED ORAL at 01:03

## 2020-02-15 RX ADMIN — HYDRALAZINE HYDROCHLORIDE 100 MG: 25 TABLET, FILM COATED ORAL at 06:17

## 2020-02-15 RX ADMIN — OXYCODONE HYDROCHLORIDE AND ACETAMINOPHEN 1 TABLET: 5; 325 TABLET ORAL at 10:15

## 2020-02-15 ASSESSMENT — PAIN SCALES - GENERAL
PAINLEVEL_OUTOF10: 7
PAINLEVEL_OUTOF10: 0
PAINLEVEL_OUTOF10: 0

## 2020-02-15 NOTE — PROGRESS NOTES
Department of Orthopedic Surgery  Resident Progress Note    Patient seen and examined. Pain improved. Denies numbness and tingling  Denies chest pain, shortness of breath, dizziness/lightheadedness.      VITALS:  BP (!) 191/92   Pulse 84   Temp 98 °F (36.7 °C) (Oral)   Resp 16   Ht 5' 4\" (1.626 m)   Wt 170 lb 1.6 oz (77.2 kg)   LMP 11/16/2019 (LMP Unknown)   SpO2 98%   BMI 29.20 kg/m²     General: alert and oriented to person, place and time, well-developed and well-nourished, in no acute distress    MUSCULOSKELETAL:   right upper extremity:  · Dressing C/D/I  · No cellulitis, no drainage  · +AROM of all fingers  · Compartments soft and compressible  · +AIN/PIN/Ulnar/Median/Radial nerve function intact grossly  · +2/4 Radial pulse, Cap refill <2 sec  · Sensation intact to touch in radial/ulnar/median nerve distributions to hand    CBC:   Lab Results   Component Value Date    WBC 6.9 02/15/2020    HGB 8.5 02/15/2020    HCT 27.9 02/15/2020     02/15/2020     PT/INR:    Lab Results   Component Value Date    PROTIME 11.3 02/12/2020    PROTIME 13.6 08/14/2011    INR 1.0 02/12/2020       ASSESSMENT  · Right hand cellulitis    PLAN      · Continue physical therapy and protocol: WBAT - RUE  · Continue ABX per ID  · Dressing changed- no signs of cellulitis or drainage  · Deep venous thrombosis prophylaxis - per admitting, early mobilization  · PT/OT  · Pain Control: PO  · Monitor H&H  · Follow up in office with Dr. Key Morrow  · Dressings may be removed in 3 days and dry sterile dressing every other day until no further drianage  · Discuss with Dr. Key Morrow

## 2020-02-15 NOTE — DISCHARGE SUMMARY
[facility] Physician Discharge Summary       Salomon Bosworth, MD  [address] [facility] [phone]    In 3 days      Stevo Bradshaw MD  [address] [place] [address]  [phone]    In 1 week      [address] [place] [phone]    Call in 1 day        Nikia Mcdonald Tues 2/18/20 - chair time 11;25 am - be there 1/2 hour earlier for paperwork       Activity level: Activity as tolerated    Diet: DIET CARB CONTROL;    Labs: CBC and BMP on Tuesday    Condition at discharge: Stable    Dispo: Discharge to home      Patient ID:  Bright Pablo  [id number]  4 y.o.  1990    Admit date: 2/4/2020    Discharge date and time:  2/15/2020  4:47 PM    Admission Diagnoses: Active Problems:    Hypertension    Other specified diabetes mellitus with other specified complication ([facility] [address]    Altered mental status    Acute heart failure with preserved ejection fraction (HCC)    Hypervolemia    Chronic kidney disease    Hypokalemia    Elevated troponin    Cellulitis of right hand    Mild protein-calorie malnutrition (HCC)    Chronic renal impairment  Resolved Problems:    * No resolved hospital problems. *      Discharge Diagnoses: Active Problems:    Hypertension    Other specified diabetes mellitus with other specified complication ([address]    Altered mental status    Acute heart failure with preserved ejection fraction (HCC)    Hypervolemia    Chronic kidney disease    Hypokalemia    Elevated troponin    Cellulitis of right hand    Mild protein-calorie malnutrition (HCC)    Chronic renal impairment  Resolved Problems:    * No resolved hospital problems.  *      Consults:  IP CONSULT TO NEPHROLOGY  IP CONSULT TO CARDIOLOGY  IP CONSULT TO INFECTIOUS DISEASES  IP CONSULT TO ORTHOPEDIC SURGERY  IP CONSULT TO DIETITIAN  IP CONSULT TO HEART FAILURE NURSE/COORDINATOR  IP CONSULT TO HEART FAILURE NURSE/COORDINATOR  IP CONSULT TO SOCIAL WORK    Procedures: tunneled catheter was placed on 2/12/20. I&D of right hand bedside on 2/11/20. Hospital Course: Bebeto Lubin is a 34year old female with a history of CKD, DM, HTN that was brought to the Emergency Department with altered mental status. She also reported leg edema and urinating once a day despite taking diuretics. CT of the head was negative. Drug screen was positive for oxycodone, although she denied taking narcotics. Her systolic blood pressure was over 979 systolic. Chest x ray showed vascular congestion flash pulmonary edema. proBNP over 70,000. She was placed on IV Bumex. Nephrology and Cardiology were consulted. Bumex was changed to a drip per Nephrology. Troponin levels were elevated on admission 0.09 and 0.10. Enzymes were likely elevated due to demand ischemia. No further Cardiology workup. Bumex drip was stopped and the patient was placed on IV Bumex twice a day. Creatinine continued to increased and patient remained edematous. A tunneled was placed on 2/12/20. Patient received hemodialysis on 2/12/20,  2/13/20, 2/14/20 and ultrafiltration on 2/15/20. She has a chair time set up at 11:25 at [facility] on [address] Urine culture was positive for Gemella mobillorum on 2/4/20. ID was consulted. .  Repeat urine sent on 2/8/20 with Klebsiella pneumoniae. She received Vancomycin x 1 dose on 2/8 and again on 2/10. On oral Cipro. Blood culture was  positive for gram positive cocci in clusters in bottle 1, bottle 2 with no growth. Repeat Blood cultures sent on 2/9/20 show no growth to date. Initial positive blood cultures were likely contaminant. She did develop right hand cellulitis likely from IV infiltrate. Orthopedic Surgery was consulted and she had a bedside I&D on 2/11/20,  Aspirate grew Staphylococcus aureus. She was started on doxycycline.   MRSA of the nares was positive and she received 5 days of bactroban to nares. Edema improved and creatinine trended down. She is stable for discharge with outpatient follow up. She is to follow up with ID, Nephrology, Cardiology and her PCP at discharge. She was encouraged to call [facility] to get established with the kidney and pancreas transplant team as instructed on prior admissions. She will need to continue oral Cipro and doxycycline at discharge. Discharge Exam:  Vitals:    02/15/20 1503 02/15/20 1532 02/15/20 1606 02/15/20 1615   BP: (!) 148/90 (!) 156/93 (!) 167/94 (!) 163/99   Pulse: 81 81 82 79   Resp:    18   Temp:    97.8 °F (36.6 °C)   TempSrc:       SpO2:       Weight:    157 lb 10.1 oz (71.5 kg)   Height:         General Appearance: alert and oriented to person, place and time and in no acute distress  Skin: warm and dry  Head: normocephalic and atraumatic  Eyes: pupils equal, round, and reactive to light, extraocular eye movements intact, conjunctivae normal  Neck: neck supple and non tender without mass   Pulmonary/Chest: diminished bilaterally- no wheezes, rales or rhonchi, no respiratory distress, RTDC  Cardiovascular: normal rate, normal S1 and S2   Abdomen: soft, non-tender, non-distended, normal bowel sounds, no masses or organomegaly  Extremities: no cyanosis, no clubbing and 2+ bilateral lower extremity edema, right hand dressing  Neurologic: no cranial nerve deficit and speech normal    I/O last 3 completed shifts:   In: 680 [P.O.:180]  Out: 4950 [Urine:2450]  I/O this shift:  In: 300   Out: 2300       LABS:  Recent Labs     02/13/20  0630 02/14/20  0545 02/15/20  0630    144 142   K 4.5 4.1 3.9   CL 94* 106 104   CO2 22 30* 31*   BUN 44* 27* 20   CREATININE 3.3* 2.6* 2.3*   GLUCOSE 574* 40* 58*   CALCIUM 8.1* 8.0* 8.5*       Recent Labs     02/13/20  0630 02/14/20  0545 02/15/20  0630   WBC 8.9 8.3 6.9   RBC 2.80* 2.85* 2.87*   HGB 8.3* 8.4* 8.5*   HCT 27.7* 27.4* 27.9*   MCV 98.9 96.1 97.2   MCH 29.6 29.5 times daily  Qty: 60 tablet, Refills: 3      aspirin 81 MG chewable tablet Take 1 tablet by mouth daily  Qty: 30 tablet, Refills: 0      bumetanide (BUMEX) 1 MG tablet Take 1 tablet by mouth daily  Qty: 30 tablet, Refills: 3      LORazepam (ATIVAN) 0.5 MG tablet Take 0.5 mg by mouth 2 times daily as needed for Anxiety. famotidine (PEPCID) 20 MG tablet Take 1 tablet by mouth 2 times daily  Qty: 60 tablet, Refills: 0      insulin aspart (NOVOLOG) 100 UNIT/ML injection vial Inject 0-10 Units into the skin 3 times daily (before meals) *Per Sliding Scale*      glucose (GLUTOSE) 40 % GEL Take 15 g by mouth as needed  Qty: 45 g, Refills: 1      glucose blood VI test strips (ASCENSIA AUTODISC VI;ONE TOUCH ULTRA TEST VI) strip Indications: 5x a day Freestyle Lite -Tests 5 times daily and as needed for low glucose. E10.10  Qty: 200 strip, Refills: 3      LANCETS THIN by Does not apply route. Indications: cks 5xa a day      Insulin Syringe-Needle U-100 (INSULIN SYRINGE .5CC/30GX1/2\") 30G X 1/2\" 0.5 ML MISC by Does not apply route. Indications: uses 6-7 a day         STOP taking these medications       sodium bicarbonate 650 MG tablet Comments:   Reason for Stopping:         vancomycin (VANCOCIN) 50 mg/mL oral solution Comments:   Reason for Stopping:         calcium acetate (PHOSLO) 667 MG capsule Comments:   Reason for Stopping:         bethanechol (URECHOLINE) 25 MG tablet Comments:   Reason for Stopping:                 Note that more than 30 minutes was spent in preparing discharge papers, discussing discharge with patient, medication review, etc.    NOTE: This report was transcribed using voice recognition software.  Every effort was made to ensure accuracy; however, inadvertent computerized transcription errors may be present.     Signed:  Electronically signed by HEBER Scott CNP on 2/15/2020 at 4:47 PM

## 2020-02-15 NOTE — FLOWSHEET NOTE
02/15/20 1615   Vital Signs   BP (!) 163/99   Temp 97.8 °F (36.6 °C)   Pulse 79   Resp 18   Weight 157 lb 10.1 oz (71.5 kg)   Weight Method Bed scale   Percent Weight Change -2.72   Pain Assessment   Pain Assessment 0-10   Pain Level 0   Post-Hemodialysis Assessment   Post-Treatment Procedures Blood returned;Catheter capped, clamped and heparinized x 2 ports   Machine Disinfection Process Acid/Vinegar Clean;Bleach; Exterior Machine Disinfection   Rinseback Volume (ml) 300 ml   Total Liters Processed (l/min) 0 l/min   Dialyzer Clearance Lightly streaked   Duration of Treatment (minutes) 120 minutes   Hemodialysis Intake (ml) 300 ml   Hemodialysis Output (ml) 2300 ml   NET Removed (ml) 2000 ml   Tolerated Treatment Good   Patient Response to Treatment tolerated well   Bilateral Breath Sounds Clear   Edema Right lower extremity; Left lower extremity   RLE Edema +2;Pitting   LLE Edema +2;Pitting   Physician Notified?  No

## 2020-02-15 NOTE — PROGRESS NOTES
[address] [place] [address] [facility]  Progress Note    F/U: uti/right hand pain    Chief Complaint   Patient presents with    Loss of Consciousness     SUBJECTIVE:  Seen at bedside with family present   S/p I and D right hand has postop dressing   Complaining of pain  No urinary complaints  Feels better  No FEVERS, CHILLS, nausea, vomiting, diarrhea. Patient is tolerating medications. No reported adverse drug reactions. Review of systems:  As stated above in the chief complaint, otherwise negative. OBJECTIVE:  BP (!) 191/92   Pulse 84   Temp 98.6 °F (37 °C) (Oral)   Resp 16   Ht 5' 4\" (1.626 m)   Wt 156 lb 15.5 oz (71.2 kg)   LMP 2019 (LMP Unknown)   SpO2 98%   BMI 26.94 kg/m²   Temp  Av.2 °F (36.8 °C)  Min: 98 °F (36.7 °C)  Max: 98.6 °F (37 °C)  Constitutional:  The patient is awake, alert, and oriented. Skin:    Warm and dry. No rashes were noted. HEENT:   Round and reactive pupils. AT/NC   Chest:   No use of accessory muscles to breathe. Symmetrical expansion. Cardiovascular:  S1 and S2 are rhythmic and regular. No murmurs appreciated. Abdomen:   Positive bowel sounds to auscultation. Benign to palpation. Extremities:   No clubbing, no cyanosis, dec edema.  right hand dressed  CNS    AAxO   Lines: piv  HD cath   Reyes yellow clear    Radiology:  Laboratory and Tests Review:  Lab Results   Component Value Date    WBC 6.9 02/15/2020    WBC 8.3 2020    WBC 8.9 2020    HGB 8.5 (L) 02/15/2020    HCT 27.9 (L) 02/15/2020    MCV 97.2 02/15/2020     02/15/2020     No results found for: [facility]  Lab Results   Component Value Date    ALT 89 (H) 02/15/2020    AST 73 (H) 02/15/2020    ALKPHOS 168 (H) 02/15/2020    BILITOT <0.2 02/15/2020     Lab Results   Component Value Date     02/15/2020    K 3.9 02/15/2020    K 4.4 2020     02/15/2020    CO2 31 02/15/2020    BUN 20 02/15/2020    CREATININE 2.3 02/15/2020    CREATININE 2.6 2020 CREATININE 3.3 02/13/2020    GFRAA 31 02/15/2020    LABGLOM 31 02/15/2020    GLUCOSE 58 02/15/2020    GLUCOSE 130 05/18/2012    PROT 5.6 02/15/2020    LABALBU 2.5 02/15/2020    LABALBU 4.1 05/18/2012    CALCIUM 8.5 02/15/2020    BILITOT <0.2 02/15/2020    ALKPHOS 168 02/15/2020    AST 73 02/15/2020    ALT 89 02/15/2020     Lab Results   Component Value Date    CRP 43.1 (H) 11/01/2019    CRP 0.3 10/02/2019    CRP 0.4 09/28/2017     Lab Results   Component Value Date    SEDRATE 135 (H) 11/01/2019    SEDRATE 14 09/28/2017       Microbiology:   Lab Results   Component Value Date    BLOODCULT2 5 Days- no growth 02/09/2020    BLOODCULT2 5 Days- no growth 02/08/2020    BLOODCULT2 5 Days- no growth 10/30/2019    ORG Staphylococcus aureus 02/11/2020    ORG MRSA nasal colonization 02/08/2020    ORG Coagulase Negative Staphylococci 02/08/2020     WOUND/ABSCESS   Date Value Ref Range Status   12/11/2014 (A)  Final    Mixed yulisa also isolated, including:  Alpha hemolytic Strep species  Corynebacteria     12/11/2014 Heavy growth  Final       Body Fluid Culture, Sterile   Date Value Ref Range Status   02/11/2020   Final    Moderate growth  Methicillin resistant Staph aureus isolated. Most Methicillin  resistant Staphylococcus are usually resistant to multiple  antibiotics including other B-Lactams, Aminoglycosides,  Macrolides, Clindamycin and Tetracycline. Contact isolation  is indicated.        MRSA Culture Only   Date Value Ref Range Status   10/30/2019 Methicillin resistant Staph aureus not isolated  Final     ASSESSMENT  GPC clusters bacteremia-CONS PROB CONATMINANT  Right hand cellulitis abscess MRSA vanco by levels with doxy  Gn bacteruria KLEBSIELLA PNEUMONIA on cipro  PREVIOUS URINE CX GEMELLA  MRSA COLONIZATION  FU BC NEGATIVE   CKD on hemo    PLAN  OK to DC from ID standpoint and follow up in 2 weeks  DC Cipro  Doxycycline 100 mg po BID x 14 days     Electronically signed by Pramod Cornea, APRN - NP on 2/15/2020 at

## 2020-02-25 ENCOUNTER — HOSPITAL ENCOUNTER (OUTPATIENT)
Dept: OTHER | Age: 30
Discharge: HOME | End: 2020-02-25

## 2020-03-23 ENCOUNTER — APPOINTMENT (OUTPATIENT)
Dept: GENERAL RADIOLOGY | Age: 30
End: 2020-03-23
Payer: COMMERCIAL

## 2020-03-23 ENCOUNTER — HOSPITAL ENCOUNTER (EMERGENCY)
Age: 30
Discharge: HOME | End: 2020-03-23
Attending: EMERGENCY MEDICINE
Payer: COMMERCIAL

## 2020-03-23 VITALS
DIASTOLIC BLOOD PRESSURE: 77 MMHG | SYSTOLIC BLOOD PRESSURE: 116 MMHG | WEIGHT: 143 LBS | BODY MASS INDEX: 24.55 KG/M2 | HEART RATE: 68 BPM | TEMPERATURE: 97.5 F | RESPIRATION RATE: 16 BRPM | OXYGEN SATURATION: 100 %

## 2020-03-23 PROCEDURE — G0382 LEV 3 HOSP TYPE B ED VISIT: HCPCS

## 2020-03-23 PROCEDURE — 73630 X-RAY EXAM OF FOOT: CPT

## 2020-03-23 RX ORDER — AMOXICILLIN AND CLAVULANATE POTASSIUM 875; 125 MG/1; MG/1
1 TABLET, FILM COATED ORAL 2 TIMES DAILY
Qty: 20 TABLET | Refills: 0 | Status: SHIPPED | OUTPATIENT
Start: 2020-03-23 | End: 2020-04-02

## 2020-03-23 ASSESSMENT — PAIN DESCRIPTION - PROGRESSION: CLINICAL_PROGRESSION: GRADUALLY WORSENING

## 2020-03-23 ASSESSMENT — PAIN DESCRIPTION - DESCRIPTORS: DESCRIPTORS: SORE

## 2020-03-23 ASSESSMENT — PAIN DESCRIPTION - FREQUENCY: FREQUENCY: CONTINUOUS

## 2020-03-23 ASSESSMENT — PAIN DESCRIPTION - ORIENTATION: ORIENTATION: LEFT

## 2020-03-23 ASSESSMENT — PAIN SCALES - GENERAL: PAINLEVEL_OUTOF10: 8

## 2020-03-23 ASSESSMENT — PAIN DESCRIPTION - PAIN TYPE: TYPE: ACUTE PAIN

## 2020-03-23 ASSESSMENT — PAIN DESCRIPTION - LOCATION: LOCATION: ANKLE

## 2020-03-23 NOTE — ED PROVIDER NOTES
reviewed. Allergies: Toradol [ketorolac tromethamine]    -------------------------------------------------- RESULTS -------------------------------------------------  All laboratory and radiology results have been personally reviewed by myself   LABS:  No results found for this visit on 03/23/20. RADIOLOGY:  Interpreted by Radiologist.  XR FOOT LEFT (MIN 3 VIEWS)   Final Result   No acute fracture or dislocation or plain film   radiographic evidence of osteomyelitis         Xr Foot Left (min 3 Views)    Result Date: 3/23/2020  Location:100 Exam: XR FOOT LEFT (MIN 3 VIEWS) History:  Partially healed wound within lateral aspect of left foot. Burn by space. 3 weeks ago. Patient did not initially feel pain, but is now having pain. Diabetes. Findings: Comparison: None. 3 views of the left foot were obtained. No acute fracture or dislocation is seen. No radiopaque foreign body or abnormal soft tissue calcification is seen. No focal lytic or blastic osseous lesion or periosteal reaction is identified. No significant degenerative changes are noted. No acute fracture or dislocation or plain film radiographic evidence of osteomyelitis       ------------------------- NURSING NOTES AND VITALS REVIEWED ---------------------------   The nursing notes within the ED encounter and vital signs as below have been reviewed. /77   Pulse 68   Temp 97.5 °F (36.4 °C) (Oral)   Resp 16   Wt 143 lb (64.9 kg)   LMP 03/20/2020   SpO2 100%   BMI 24.55 kg/m²   Oxygen Saturation Interpretation: Normal      ---------------------------------------------------PHYSICAL EXAM--------------------------------------      Constitutional/General: Alert and oriented x3, well appearing, non toxic in NAD  Head: NC/AT  Eyes: PERRL, EOMI  Mouth: Oropharynx clear, handling secretions, no trismus  Neck: Supple, full ROM, no meningeal signs  Pulmonary: Lungs clear to auscultation bilaterally, no wheezes, rales, or rhonchi.  Not in respiratory

## 2020-03-27 ENCOUNTER — HOSPITAL ENCOUNTER (EMERGENCY)
Age: 30
Discharge: HOME | End: 2020-03-27
Attending: EMERGENCY MEDICINE
Payer: COMMERCIAL

## 2020-03-27 VITALS
HEART RATE: 87 BPM | BODY MASS INDEX: 22.83 KG/M2 | WEIGHT: 133 LBS | RESPIRATION RATE: 16 BRPM | TEMPERATURE: 98.3 F | DIASTOLIC BLOOD PRESSURE: 86 MMHG | SYSTOLIC BLOOD PRESSURE: 121 MMHG | OXYGEN SATURATION: 99 %

## 2020-03-27 PROCEDURE — G0383 LEV 4 HOSP TYPE B ED VISIT: HCPCS

## 2020-03-27 ASSESSMENT — ENCOUNTER SYMPTOMS
ALLERGIC/IMMUNOLOGIC NEGATIVE: 1
CHEST TIGHTNESS: 0
APNEA: 0
STRIDOR: 0
COUGH: 0
SHORTNESS OF BREATH: 1
EYES NEGATIVE: 1
GASTROINTESTINAL NEGATIVE: 1
CHOKING: 0
WHEEZING: 0

## 2020-03-27 NOTE — ED PROVIDER NOTES
Multiple complaints regarding potential symptoms of COVID19 and anxiety. She is a newly enrolled dialysis patient and has a history of gen. Anxiety dis. In her initial complaints she states she is short of breath and has body aches. She is out of her Ativan and it is too early for a refill by dates. Review of Systems   Constitutional: Negative. HENT: Negative. Eyes: Negative. Respiratory: Positive for shortness of breath. Negative for apnea, cough, choking, chest tightness, wheezing and stridor. Cardiovascular: Negative for chest pain. Gastrointestinal: Negative. Endocrine: Negative. Genitourinary: Negative. Musculoskeletal: Positive for myalgias. Allergic/Immunologic: Negative. Neurological: Negative. Hematological: Negative. Psychiatric/Behavioral: The patient is nervous/anxious. All other systems reviewed and are negative.     --------------------------------------------- PAST HISTORY ---------------------------------------------  Past Medical History:  has a past medical history of Acute congestive heart failure ([facility] [address] BC (acute kidney injury) ([facility] [address] Cephalgia, Chronic kidney disease, Depression, Diabetes mellitus ([facility] [address] Diabetic ketoacidosis ([facility] [address] Iron deficiency anemia, MDRO (multiple drug resistant organisms) resistance, MRSA (methicillin resistant Staphylococcus aureus), Non compliance w medication regimen, Other disorders of kidney and ureter, Pregnancy, and Severe pre-eclampsia in third trimester. Past Surgical History:  has a past surgical history that includes ECHO Compl W Dop Color Flow (2012); ECHO Compl W Dop Color Flow (6/10/2013);  section; Tunneled venous port placement (2018); pr esophagogastroduodenoscopy transoral diagnostic (N/A, 2018); back surgery; Colonoscopy (N/A, 2018); Colonoscopy (N/A, 2018); Upper gastrointestinal endoscopy (2018);  Upper gastrointestinal endoscopy (N/A, 10/11/2019); and Cholecystectomy, laparoscopic (N/A, 11/7/2019). Social History:  reports that she has been smoking cigarettes. She has a 3.00 pack-year smoking history. She uses smokeless tobacco. She reports that she does not drink alcohol or use drugs. Family History: family history includes Asthma in her brother and mother; Diabetes in her mother; High Blood Pressure in her father and mother; Hypertension in her mother. The patients home medications have been reviewed. Allergies: Toradol [ketorolac tromethamine]    -------------------------------------------------- RESULTS -------------------------------------------------  No results found for this visit on 03/27/20. No orders to display       ------------------------- NURSING NOTES AND VITALS REVIEWED ---------------------------   The nursing notes within the ED encounter and vital signs as below have been reviewed. /86   Pulse 87   Temp 98.3 °F (36.8 °C) (Oral)   Resp 16   Wt 133 lb (60.3 kg)   LMP 03/20/2020   SpO2 99%   BMI 22.83 kg/m²   Oxygen Saturation Interpretation: Normal      ------------------------------------------ PROGRESS NOTES ------------------------------------------   I have spoken with the patient and discussed todays results, in addition to providing specific details for the plan of care and counseling regarding the diagnosis and prognosis. Their questions are answered at this time and they are agreeable with the plan.      --------------------------------- ADDITIONAL PROVIDER NOTES ---------------------------------        This patient is stable for discharge. I have shared the specific conditions for return, as well as the importance of follow-up. IMPRESSION:     1. Anxiety state    2.  Chronic kidney disease, unspecified CKD stage      Discharge Medication List as of 3/27/2020  6:32 PM      CONTINUE these medications which have NOT CHANGED    Details   amoxicillin-clavulanate (AUGMENTIN) 875-125 MG per tablet Take 1 Thought Content:  Thought content normal.         Cognition and Memory: Cognition and memory normal.         Judgment: Judgment normal.          Procedures     [facility]                   Yelena Pappas,   03/27/20 [id number]

## 2020-06-02 ENCOUNTER — HOSPITAL ENCOUNTER (EMERGENCY)
Age: 30
Discharge: HOME | End: 2020-06-03
Attending: EMERGENCY MEDICINE
Payer: COMMERCIAL

## 2020-06-02 LAB
BASOPHILS ABSOLUTE: 0.04 E9/L (ref 0–0.2)
BASOPHILS RELATIVE PERCENT: 1.3 % (ref 0–2)
CHP ED QC CHECK: YES
EOSINOPHILS ABSOLUTE: 0.12 E9/L (ref 0.05–0.5)
EOSINOPHILS RELATIVE PERCENT: 3.9 % (ref 0–6)
GLUCOSE BLD-MCNC: 419 MG/DL
HCT VFR BLD CALC: 25.1 % (ref 34–48)
HEMOGLOBIN: 8.1 G/DL (ref 11.5–15.5)
IMMATURE GRANULOCYTES #: 0.01 E9/L
IMMATURE GRANULOCYTES %: 0.3 % (ref 0–5)
LYMPHOCYTES ABSOLUTE: 1.1 E9/L (ref 1.5–4)
LYMPHOCYTES RELATIVE PERCENT: 35.4 % (ref 20–42)
MCH RBC QN AUTO: 31.2 PG (ref 26–35)
MCHC RBC AUTO-ENTMCNC: 32.3 % (ref 32–34.5)
MCV RBC AUTO: 96.5 FL (ref 80–99.9)
METER GLUCOSE: 419 MG/DL (ref 74–99)
MONOCYTES ABSOLUTE: 0.08 E9/L (ref 0.1–0.95)
MONOCYTES RELATIVE PERCENT: 2.6 % (ref 2–12)
NEUTROPHILS ABSOLUTE: 1.76 E9/L (ref 1.8–7.3)
NEUTROPHILS RELATIVE PERCENT: 56.5 % (ref 43–80)
PDW BLD-RTO: 14.5 FL (ref 11.5–15)
PLATELET # BLD: 195 E9/L (ref 130–450)
PMV BLD AUTO: 10.1 FL (ref 7–12)
RBC # BLD: 2.6 E12/L (ref 3.5–5.5)
WBC # BLD: 3.1 E9/L (ref 4.5–11.5)

## 2020-06-02 PROCEDURE — 82962 GLUCOSE BLOOD TEST: CPT

## 2020-06-02 PROCEDURE — 80048 BASIC METABOLIC PNL TOTAL CA: CPT

## 2020-06-02 PROCEDURE — 84702 CHORIONIC GONADOTROPIN TEST: CPT

## 2020-06-02 PROCEDURE — 83605 ASSAY OF LACTIC ACID: CPT

## 2020-06-02 PROCEDURE — 84484 ASSAY OF TROPONIN QUANT: CPT

## 2020-06-02 PROCEDURE — 82800 BLOOD PH: CPT

## 2020-06-02 PROCEDURE — 82010 KETONE BODYS QUAN: CPT

## 2020-06-02 PROCEDURE — 2580000003 HC RX 258: Performed by: EMERGENCY MEDICINE

## 2020-06-02 PROCEDURE — 85025 COMPLETE CBC W/AUTO DIFF WBC: CPT

## 2020-06-02 PROCEDURE — 83690 ASSAY OF LIPASE: CPT

## 2020-06-02 PROCEDURE — 99284 EMERGENCY DEPT VISIT MOD MDM: CPT

## 2020-06-02 RX ORDER — 0.9 % SODIUM CHLORIDE 0.9 %
1000 INTRAVENOUS SOLUTION INTRAVENOUS ONCE
Status: COMPLETED | OUTPATIENT
Start: 2020-06-02 | End: 2020-06-03

## 2020-06-02 RX ADMIN — SODIUM CHLORIDE 1000 ML: 9 INJECTION, SOLUTION INTRAVENOUS at 23:48

## 2020-06-02 ASSESSMENT — PAIN DESCRIPTION - LOCATION: LOCATION: CHEST

## 2020-06-02 ASSESSMENT — PAIN DESCRIPTION - PAIN TYPE: TYPE: ACUTE PAIN

## 2020-06-02 ASSESSMENT — PAIN DESCRIPTION - FREQUENCY: FREQUENCY: CONTINUOUS

## 2020-06-02 ASSESSMENT — ENCOUNTER SYMPTOMS
ABDOMINAL PAIN: 0
NAUSEA: 0
VOMITING: 0
EYE REDNESS: 0
SHORTNESS OF BREATH: 0

## 2020-06-02 ASSESSMENT — PAIN SCALES - GENERAL: PAINLEVEL_OUTOF10: 8

## 2020-06-02 ASSESSMENT — PAIN DESCRIPTION - ORIENTATION: ORIENTATION: RIGHT

## 2020-06-02 ASSESSMENT — PAIN DESCRIPTION - ONSET: ONSET: SUDDEN

## 2020-06-02 ASSESSMENT — PAIN - FUNCTIONAL ASSESSMENT: PAIN_FUNCTIONAL_ASSESSMENT: ACTIVITIES ARE NOT PREVENTED

## 2020-06-02 ASSESSMENT — PAIN DESCRIPTION - DESCRIPTORS: DESCRIPTORS: BURNING;ACHING

## 2020-06-02 ASSESSMENT — PAIN DESCRIPTION - PROGRESSION: CLINICAL_PROGRESSION: NOT CHANGED

## 2020-06-03 ENCOUNTER — APPOINTMENT (OUTPATIENT)
Dept: GENERAL RADIOLOGY | Age: 30
End: 2020-06-03
Payer: COMMERCIAL

## 2020-06-03 VITALS
OXYGEN SATURATION: 99 % | DIASTOLIC BLOOD PRESSURE: 101 MMHG | SYSTOLIC BLOOD PRESSURE: 162 MMHG | WEIGHT: 127.65 LBS | RESPIRATION RATE: 14 BRPM | HEART RATE: 114 BPM | BODY MASS INDEX: 21.91 KG/M2 | TEMPERATURE: 98 F

## 2020-06-03 LAB
ANION GAP SERPL CALCULATED.3IONS-SCNC: 8 MMOL/L (ref 7–16)
BETA-HYDROXYBUTYRATE: 0.53 MMOL/L (ref 0.02–0.27)
BUN BLDV-MCNC: 18 MG/DL (ref 6–20)
CALCIUM SERPL-MCNC: 8 MG/DL (ref 8.6–10.2)
CHLORIDE BLD-SCNC: 98 MMOL/L (ref 98–107)
CHP ED QC CHECK: YES
CO2: 29 MMOL/L (ref 22–29)
CREAT SERPL-MCNC: 2.8 MG/DL (ref 0.5–1)
GFR AFRICAN AMERICAN: 24
GFR NON-AFRICAN AMERICAN: 24 ML/MIN/1.73
GLUCOSE BLD-MCNC: 202 MG/DL
GLUCOSE BLD-MCNC: 487 MG/DL (ref 74–99)
GONADOTROPIN, CHORIONIC (HCG) QUANT: 0.4 MIU/ML
LACTIC ACID: 1.5 MMOL/L (ref 0.5–2.2)
LIPASE: 6 U/L (ref 13–60)
METER GLUCOSE: 202 MG/DL (ref 74–99)
PH VENOUS: 7.44 (ref 7.35–7.45)
POTASSIUM SERPL-SCNC: 4.3 MMOL/L (ref 3.5–5)
SODIUM BLD-SCNC: 135 MMOL/L (ref 132–146)
TROPONIN: 0.01 NG/ML (ref 0–0.03)

## 2020-06-03 PROCEDURE — 6360000002 HC RX W HCPCS: Performed by: EMERGENCY MEDICINE

## 2020-06-03 PROCEDURE — 6370000000 HC RX 637 (ALT 250 FOR IP): Performed by: EMERGENCY MEDICINE

## 2020-06-03 PROCEDURE — 82962 GLUCOSE BLOOD TEST: CPT

## 2020-06-03 PROCEDURE — 96374 THER/PROPH/DIAG INJ IV PUSH: CPT

## 2020-06-03 PROCEDURE — 71046 X-RAY EXAM CHEST 2 VIEWS: CPT

## 2020-06-03 RX ORDER — FENTANYL CITRATE 50 UG/ML
25 INJECTION, SOLUTION INTRAMUSCULAR; INTRAVENOUS ONCE
Status: COMPLETED | OUTPATIENT
Start: 2020-06-03 | End: 2020-06-03

## 2020-06-03 RX ADMIN — FENTANYL CITRATE 25 MCG: 50 INJECTION, SOLUTION INTRAMUSCULAR; INTRAVENOUS at 01:09

## 2020-06-03 RX ADMIN — METOPROLOL TARTRATE 100 MG: 25 TABLET, FILM COATED ORAL at 02:16

## 2020-06-03 ASSESSMENT — PAIN DESCRIPTION - PROGRESSION: CLINICAL_PROGRESSION: GRADUALLY IMPROVING

## 2020-06-03 ASSESSMENT — PAIN SCALES - GENERAL
PAINLEVEL_OUTOF10: 9
PAINLEVEL_OUTOF10: 4

## 2020-06-03 ASSESSMENT — PAIN DESCRIPTION - PAIN TYPE: TYPE: ACUTE PAIN

## 2020-06-03 NOTE — ED PROVIDER NOTES
Chronic kidney disease, Depression, Diabetes mellitus ([facility] [address] Diabetic ketoacidosis ([facility] [address] Iron deficiency anemia, MDRO (multiple drug resistant organisms) resistance, MRSA (methicillin resistant Staphylococcus aureus), Non compliance w medication regimen, Other disorders of kidney and ureter, Pregnancy, and Severe pre-eclampsia in third trimester. Past Surgical History:  has a past surgical history that includes ECHO Compl W Dop Color Flow (2012); ECHO Compl W Dop Color Flow (6/10/2013);  section; Tunneled venous port placement (2018); pr esophagogastroduodenoscopy transoral diagnostic (N/A, 2018); back surgery; Colonoscopy (N/A, 2018); Colonoscopy (N/A, 2018); Upper gastrointestinal endoscopy (2018); Upper gastrointestinal endoscopy (N/A, 10/11/2019); and Cholecystectomy, laparoscopic (N/A, 2019). Social History:  reports that she has been smoking cigarettes. She has a 3.00 pack-year smoking history. She uses smokeless tobacco. She reports that she does not drink alcohol or use drugs. Family History: family history includes Asthma in her brother and mother; Diabetes in her mother; High Blood Pressure in her father and mother; Hypertension in her mother. The patients home medications have been reviewed. Allergies: Toradol [ketorolac tromethamine]    ---------------------------------------------------PHYSICAL EXAM--------------------------------------    Constitutional/General: Alert and oriented x3, well appearing, non toxic in NAD  Head: Normocephalic and atraumatic  Mouth: Oropharynx clear, handling secretions, no trismus  Neck: Supple, full ROM,  Pulmonary: Lungs clear to auscultation bilaterally, no wheezes, rales, or rhonchi. Not in respiratory distress  Cardiovascular:  Regular rate. Regular rhythm.  No murmurs  Chest: no chest wall tenderness, there is a dialysis port present in the right anterior chest wall, no active bleeding present, there is scant Making:   I, Dr. Martina Garcia am the primary physician of record. Julius Schmidt is a 31 y.o. female who presents to the ED for dizziness and check of her dialysis port. . . Differential diagnosis includes orthostatic hypotension, anemia the patient does have a past medical history of   has a past medical history of Acute congestive heart failure ([address] BC (acute kidney injury) ([address] Cephalgia, Chronic kidney disease, Depression, Diabetes mellitus ([address] Diabetic ketoacidosis ([address] Iron deficiency anemia, MDRO (multiple drug resistant organisms) resistance, MRSA (methicillin resistant Staphylococcus aureus), Non compliance w medication regimen, Other disorders of kidney and ureter, Pregnancy, and Severe pre-eclampsia in third trimester. .   The patient was found to have hyperglycemia, no evidence of DKA. The patient had no active bleeding from dialysis port in the emergency department. The patient was mildly tachycardic and hypertensive upon discharge but was due for metoprolol. Patient given metoprolol in the emergency department. She will go to her dialysis today. Re-Evaluations/Consultations:             ED Course as of Jun 03 1448   Wed Jun 03, 2020   0209 Patient is hypertensive and tachycardic. The patient is due for metoprolol. [MT]      ED Course User Index  [MT] Maria C Estes DO         Critical Care: Please note that the withdrawal or failure to initiate urgent interventions for this patient would likely result in a life threatening deterioration or permanent disability. Accordingly this patient received 0 minutes of critical care time, excluding separately billable procedures. This patient's ED course included: History, physical examination, reevaluation prior to disposition, labs, imaging, IV fluids,   Metoprolol  This patient has remained hemodynamically stable during their ED course. Counseling:    The emergency provider has spoken with the patient and discussed

## 2020-06-04 ENCOUNTER — CARE COORDINATION (OUTPATIENT)
Dept: CARE COORDINATION | Age: 30
End: 2020-06-04

## 2020-06-04 NOTE — CARE COORDINATION
Patient contacted regarding recent discharge and COVID-19 risk. Discussed COVID-19 related testing which was not done at this time. Test results were not done. Patient informed of results, if available? Not done. Care Transition Nurse/ Ambulatory Care Manager contacted the patient by telephone to perform post discharge assessment. Verified name and  with patient as identifiers.    -Pt reports that she is feeling better. Dizziness and lightheadedness are gone. Dialysis port intact with no redness, bleeding or discomfort. Pt reports that she had hemodialysis yesterday, 6/3/2020 with no issues. No fever, cough or SOB.     -FBS today was 123.  -No PCP appt currently. ACM offered to schedule an appt but Pt declined. She said she will go if needed.  -Pt reports that she saw her Nephrologist yesterday, 6/3/2020.  -Pt reports that she is active with MyChart. -ACM offered GetWell LOOP program, Pt declined. -Reviewed Adv Care Planning with Pt. Patient has following risk factors of: diabetes and HTN, ESRD, new hemodialysis, depression, anxiety. CTN/ACM reviewed discharge instructions, medical action plan and red flags related to discharge diagnosis. Reviewed and educated them on any new and changed medications related to discharge diagnosis. Advised obtaining a 90-day supply of all daily and as-needed medications. Education provided regarding infection prevention, and signs and symptoms of COVID-19 and when to seek medical attention with patient who verbalized understanding. Discussed exposure protocols and quarantine from [address] you at higher risk for severe illness  and given an opportunity for questions and concerns. The patient agrees to contact the COVID-19 hotline [phone] or PCP office for questions related to their healthcare. CTN/ACM provided contact information for future reference. From CDC: Are you at higher risk for severe illness?      Wash your hands

## 2020-06-17 ENCOUNTER — PREP FOR PROCEDURE (OUTPATIENT)
Dept: SURGERY | Age: 30
End: 2020-06-17

## 2020-06-17 RX ORDER — SODIUM CHLORIDE 0.9 % (FLUSH) 0.9 %
10 SYRINGE (ML) INJECTION EVERY 12 HOURS SCHEDULED
Status: CANCELLED | OUTPATIENT
Start: 2020-06-17

## 2020-06-17 RX ORDER — SODIUM CHLORIDE 0.9 % (FLUSH) 0.9 %
10 SYRINGE (ML) INJECTION PRN
Status: CANCELLED | OUTPATIENT
Start: 2020-06-17

## 2020-06-17 RX ORDER — SODIUM CHLORIDE 9 MG/ML
INJECTION, SOLUTION INTRAVENOUS CONTINUOUS
Status: CANCELLED | OUTPATIENT
Start: 2020-06-17

## 2020-06-18 ENCOUNTER — CARE COORDINATION (OUTPATIENT)
Dept: CARE COORDINATION | Age: 30
End: 2020-06-18

## 2020-06-22 ENCOUNTER — HOSPITAL ENCOUNTER (OUTPATIENT)
Age: 30
Discharge: HOME | End: 2020-06-24
Payer: COMMERCIAL

## 2020-06-22 PROCEDURE — U0003 INFECTIOUS AGENT DETECTION BY NUCLEIC ACID (DNA OR RNA); SEVERE ACUTE RESPIRATORY SYNDROME CORONAVIRUS 2 (SARS-COV-2) (CORONAVIRUS DISEASE [COVID-19]), AMPLIFIED PROBE TECHNIQUE, MAKING USE OF HIGH THROUGHPUT TECHNOLOGIES AS DESCRIBED BY CMS-2020-01-R: HCPCS

## 2020-06-23 NOTE — PROGRESS NOTES
[address] PRE-ADMISSION TESTING GENERAL INSTRUCTIONS- [facility]-phone number:[phone]    GENERAL INSTRUCTIONS  [x] Antibacterial Soap shower Night before and/or AM of Surgery  [] Teo wipe instruction sheet and wipes given. [x] Nothing by mouth after midnight, including gum, candy, mints, or water. [x] You may brush your teeth, gargle, but do NOT swallow water. []Hibiclens shower  the night before and the morning of surgery. Do not use             Hibiclens on your face or head. [x]No smoking, chewing tobacco, illegal drugs, or alcohol within 24 hours of your surgery. [x] Jewelry, valuables or body piercing's should not be brought to the hospital. All body and/or tongue piercing's must be removed prior to arriving to hospital.  ALL hair pins must be removed. [x] Do not wear makeup, lotions, powders, deodorant. Nail polish as directed by the nurse. [x] Arrange transportation with a responsible adult  to and from the hospital. If you do not have a responsible adult  to transport you, you will need to make arrangements with a medical transportation company (i.e. [employer]. A Uber/taxi/bus is not appropriate unless you are accompanied by a responsible adult ). Arrange for someone to be with you for the remainder of the day and for 24 hours after your procedure due to having had anesthesia. Who will be your  for transportation? _MOTHER________________   Who will be staying with you for 24 hrs after your procedure? _MOTHER________________  [x] Bring insurance card and photo ID.  [] Transfusion Bracelet: Please bring with you to hospital, day of surgery  [x] Bring urine specimen day of surgery. Any small container is acceptable. [] Use inhalers the morning of surgery and bring with you to hospital.  [] Bring copy of living will or healthcare power of  papers to be placed in your electronic record.   [] CPAP/BI-PAP: Please bring your machine if

## 2020-06-24 LAB
SARS-COV-2: NOT DETECTED
SOURCE: NORMAL

## 2020-06-25 ENCOUNTER — ANESTHESIA EVENT (OUTPATIENT)
Dept: OPERATING ROOM | Age: 30
End: 2020-06-25
Payer: COMMERCIAL

## 2020-06-26 ENCOUNTER — ANESTHESIA (OUTPATIENT)
Dept: OPERATING ROOM | Age: 30
End: 2020-06-26
Payer: COMMERCIAL

## 2020-06-26 ENCOUNTER — HOSPITAL ENCOUNTER (OUTPATIENT)
Age: 30
Setting detail: OUTPATIENT SURGERY
Discharge: HOME | End: 2020-06-26
Attending: SURGERY | Admitting: SURGERY
Payer: COMMERCIAL

## 2020-06-26 ENCOUNTER — APPOINTMENT (OUTPATIENT)
Dept: GENERAL RADIOLOGY | Age: 30
End: 2020-06-26
Attending: SURGERY
Payer: COMMERCIAL

## 2020-06-26 VITALS
OXYGEN SATURATION: 98 % | WEIGHT: 143 LBS | HEIGHT: 63 IN | SYSTOLIC BLOOD PRESSURE: 169 MMHG | RESPIRATION RATE: 16 BRPM | TEMPERATURE: 97.8 F | HEART RATE: 71 BPM | DIASTOLIC BLOOD PRESSURE: 92 MMHG | BODY MASS INDEX: 25.34 KG/M2

## 2020-06-26 VITALS — DIASTOLIC BLOOD PRESSURE: 96 MMHG | SYSTOLIC BLOOD PRESSURE: 168 MMHG | OXYGEN SATURATION: 100 % | TEMPERATURE: 95.4 F

## 2020-06-26 LAB
ALBUMIN SERPL-MCNC: 2.9 G/DL (ref 3.5–5.2)
ALP BLD-CCNC: 145 U/L (ref 35–104)
ALT SERPL-CCNC: 7 U/L (ref 0–32)
ANION GAP SERPL CALCULATED.3IONS-SCNC: 9 MMOL/L (ref 7–16)
AST SERPL-CCNC: 18 U/L (ref 0–31)
BASOPHILS ABSOLUTE: 0.06 E9/L (ref 0–0.2)
BASOPHILS RELATIVE PERCENT: 0.8 % (ref 0–2)
BILIRUB SERPL-MCNC: 0.2 MG/DL (ref 0–1.2)
BUN BLDV-MCNC: 7 MG/DL (ref 6–20)
CALCIUM SERPL-MCNC: 8.4 MG/DL (ref 8.6–10.2)
CHLORIDE BLD-SCNC: 101 MMOL/L (ref 98–107)
CO2: 31 MMOL/L (ref 22–29)
CREAT SERPL-MCNC: 3.3 MG/DL (ref 0.5–1)
EOSINOPHILS ABSOLUTE: 0.26 E9/L (ref 0.05–0.5)
EOSINOPHILS RELATIVE PERCENT: 3.4 % (ref 0–6)
GFR AFRICAN AMERICAN: 20
GFR NON-AFRICAN AMERICAN: 20 ML/MIN/1.73
GLUCOSE BLD-MCNC: 97 MG/DL (ref 74–99)
HCG QUALITATIVE: NEGATIVE
HCT VFR BLD CALC: 28.4 % (ref 34–48)
HEMOGLOBIN: 8.8 G/DL (ref 11.5–15.5)
IMMATURE GRANULOCYTES #: 0.03 E9/L
IMMATURE GRANULOCYTES %: 0.4 % (ref 0–5)
LYMPHOCYTES ABSOLUTE: 1.47 E9/L (ref 1.5–4)
LYMPHOCYTES RELATIVE PERCENT: 19.3 % (ref 20–42)
MAGNESIUM: 1.9 MG/DL (ref 1.6–2.6)
MCH RBC QN AUTO: 30.7 PG (ref 26–35)
MCHC RBC AUTO-ENTMCNC: 31 % (ref 32–34.5)
MCV RBC AUTO: 99 FL (ref 80–99.9)
METER GLUCOSE: 92 MG/DL (ref 74–99)
MONOCYTES ABSOLUTE: 0.47 E9/L (ref 0.1–0.95)
MONOCYTES RELATIVE PERCENT: 6.2 % (ref 2–12)
NEUTROPHILS ABSOLUTE: 5.31 E9/L (ref 1.8–7.3)
NEUTROPHILS RELATIVE PERCENT: 69.9 % (ref 43–80)
PDW BLD-RTO: 13.6 FL (ref 11.5–15)
PLATELET # BLD: 208 E9/L (ref 130–450)
PMV BLD AUTO: 10.5 FL (ref 7–12)
POTASSIUM REFLEX MAGNESIUM: 3.4 MMOL/L (ref 3.5–5)
RBC # BLD: 2.87 E12/L (ref 3.5–5.5)
SODIUM BLD-SCNC: 141 MMOL/L (ref 132–146)
TOTAL PROTEIN: 5.8 G/DL (ref 6.4–8.3)
WBC # BLD: 7.6 E9/L (ref 4.5–11.5)

## 2020-06-26 PROCEDURE — 6360000002 HC RX W HCPCS

## 2020-06-26 PROCEDURE — 7100000001 HC PACU RECOVERY - ADDTL 15 MIN: Performed by: SURGERY

## 2020-06-26 PROCEDURE — 3700000000 HC ANESTHESIA ATTENDED CARE: Performed by: SURGERY

## 2020-06-26 PROCEDURE — 71045 X-RAY EXAM CHEST 1 VIEW: CPT

## 2020-06-26 PROCEDURE — 84703 CHORIONIC GONADOTROPIN ASSAY: CPT

## 2020-06-26 PROCEDURE — 3600000003 HC SURGERY LEVEL 3 BASE: Performed by: SURGERY

## 2020-06-26 PROCEDURE — 80053 COMPREHEN METABOLIC PANEL: CPT

## 2020-06-26 PROCEDURE — 83735 ASSAY OF MAGNESIUM: CPT

## 2020-06-26 PROCEDURE — 2580000003 HC RX 258

## 2020-06-26 PROCEDURE — 2580000003 HC RX 258: Performed by: SURGERY

## 2020-06-26 PROCEDURE — 7100000000 HC PACU RECOVERY - FIRST 15 MIN: Performed by: SURGERY

## 2020-06-26 PROCEDURE — 85025 COMPLETE CBC W/AUTO DIFF WBC: CPT

## 2020-06-26 PROCEDURE — 36415 COLL VENOUS BLD VENIPUNCTURE: CPT

## 2020-06-26 PROCEDURE — 6360000002 HC RX W HCPCS: Performed by: SURGERY

## 2020-06-26 PROCEDURE — 3700000001 HC ADD 15 MINUTES (ANESTHESIA): Performed by: SURGERY

## 2020-06-26 PROCEDURE — 2500000003 HC RX 250 WO HCPCS

## 2020-06-26 PROCEDURE — 6360000002 HC RX W HCPCS: Performed by: ANESTHESIOLOGY

## 2020-06-26 PROCEDURE — 7100000010 HC PHASE II RECOVERY - FIRST 15 MIN: Performed by: SURGERY

## 2020-06-26 PROCEDURE — 82962 GLUCOSE BLOOD TEST: CPT

## 2020-06-26 PROCEDURE — 7100000011 HC PHASE II RECOVERY - ADDTL 15 MIN: Performed by: SURGERY

## 2020-06-26 PROCEDURE — 3600000013 HC SURGERY LEVEL 3 ADDTL 15MIN: Performed by: SURGERY

## 2020-06-26 PROCEDURE — 6370000000 HC RX 637 (ALT 250 FOR IP): Performed by: ANESTHESIOLOGY

## 2020-06-26 RX ORDER — SODIUM CHLORIDE 9 MG/ML
INJECTION, SOLUTION INTRAVENOUS CONTINUOUS
Status: DISCONTINUED | OUTPATIENT
Start: 2020-06-26 | End: 2020-06-26 | Stop reason: HOSPADM

## 2020-06-26 RX ORDER — ONDANSETRON 2 MG/ML
INJECTION INTRAMUSCULAR; INTRAVENOUS PRN
Status: DISCONTINUED | OUTPATIENT
Start: 2020-06-26 | End: 2020-06-26 | Stop reason: SDUPTHER

## 2020-06-26 RX ORDER — SODIUM CHLORIDE 0.9 % (FLUSH) 0.9 %
10 SYRINGE (ML) INJECTION PRN
Status: DISCONTINUED | OUTPATIENT
Start: 2020-06-26 | End: 2020-06-26 | Stop reason: HOSPADM

## 2020-06-26 RX ORDER — SUCCINYLCHOLINE/SOD CL,ISO/PF 100 MG/5ML
SYRINGE (ML) INTRAVENOUS PRN
Status: DISCONTINUED | OUTPATIENT
Start: 2020-06-26 | End: 2020-06-26 | Stop reason: SDUPTHER

## 2020-06-26 RX ORDER — ROCURONIUM BROMIDE 10 MG/ML
INJECTION, SOLUTION INTRAVENOUS PRN
Status: DISCONTINUED | OUTPATIENT
Start: 2020-06-26 | End: 2020-06-26 | Stop reason: SDUPTHER

## 2020-06-26 RX ORDER — LIDOCAINE HYDROCHLORIDE 20 MG/ML
INJECTION, SOLUTION INTRAVENOUS PRN
Status: DISCONTINUED | OUTPATIENT
Start: 2020-06-26 | End: 2020-06-26 | Stop reason: SDUPTHER

## 2020-06-26 RX ORDER — OXYCODONE HYDROCHLORIDE AND ACETAMINOPHEN 5; 325 MG/1; MG/1
1 TABLET ORAL PRN
Status: COMPLETED | OUTPATIENT
Start: 2020-06-26 | End: 2020-06-26

## 2020-06-26 RX ORDER — OXYCODONE HYDROCHLORIDE AND ACETAMINOPHEN 5; 325 MG/1; MG/1
2 TABLET ORAL PRN
Status: COMPLETED | OUTPATIENT
Start: 2020-06-26 | End: 2020-06-26

## 2020-06-26 RX ORDER — LABETALOL HYDROCHLORIDE 5 MG/ML
INJECTION, SOLUTION INTRAVENOUS PRN
Status: DISCONTINUED | OUTPATIENT
Start: 2020-06-26 | End: 2020-06-26 | Stop reason: SDUPTHER

## 2020-06-26 RX ORDER — DEXAMETHASONE SODIUM PHOSPHATE 10 MG/ML
INJECTION INTRAMUSCULAR; INTRAVENOUS PRN
Status: DISCONTINUED | OUTPATIENT
Start: 2020-06-26 | End: 2020-06-26 | Stop reason: SDUPTHER

## 2020-06-26 RX ORDER — MEPERIDINE HYDROCHLORIDE 25 MG/ML
12.5 INJECTION INTRAMUSCULAR; INTRAVENOUS; SUBCUTANEOUS EVERY 5 MIN PRN
Status: DISCONTINUED | OUTPATIENT
Start: 2020-06-26 | End: 2020-06-26 | Stop reason: HOSPADM

## 2020-06-26 RX ORDER — SODIUM CHLORIDE 9 MG/ML
INJECTION, SOLUTION INTRAVENOUS CONTINUOUS PRN
Status: DISCONTINUED | OUTPATIENT
Start: 2020-06-26 | End: 2020-06-26 | Stop reason: SDUPTHER

## 2020-06-26 RX ORDER — FENTANYL CITRATE 50 UG/ML
INJECTION, SOLUTION INTRAMUSCULAR; INTRAVENOUS PRN
Status: DISCONTINUED | OUTPATIENT
Start: 2020-06-26 | End: 2020-06-26 | Stop reason: SDUPTHER

## 2020-06-26 RX ORDER — HYDROCODONE BITARTRATE AND ACETAMINOPHEN 5; 325 MG/1; MG/1
1 TABLET ORAL EVERY 8 HOURS PRN
Qty: 9 TABLET | Refills: 0 | Status: SHIPPED | OUTPATIENT
Start: 2020-06-26 | End: 2020-06-29

## 2020-06-26 RX ORDER — VANCOMYCIN HYDROCHLORIDE 1 G/20ML
INJECTION, POWDER, LYOPHILIZED, FOR SOLUTION INTRAVENOUS PRN
Status: DISCONTINUED | OUTPATIENT
Start: 2020-06-26 | End: 2020-06-26 | Stop reason: SDUPTHER

## 2020-06-26 RX ORDER — DIPHENHYDRAMINE HYDROCHLORIDE 50 MG/ML
12.5 INJECTION INTRAMUSCULAR; INTRAVENOUS
Status: DISCONTINUED | OUTPATIENT
Start: 2020-06-26 | End: 2020-06-26 | Stop reason: HOSPADM

## 2020-06-26 RX ORDER — MIDAZOLAM HYDROCHLORIDE 1 MG/ML
INJECTION INTRAMUSCULAR; INTRAVENOUS PRN
Status: DISCONTINUED | OUTPATIENT
Start: 2020-06-26 | End: 2020-06-26 | Stop reason: SDUPTHER

## 2020-06-26 RX ORDER — HEPARIN SODIUM (PORCINE) LOCK FLUSH IV SOLN 100 UNIT/ML 100 UNIT/ML
SOLUTION INTRAVENOUS
Status: DISCONTINUED
Start: 2020-06-26 | End: 2020-06-26 | Stop reason: HOSPADM

## 2020-06-26 RX ORDER — PROPOFOL 10 MG/ML
INJECTION, EMULSION INTRAVENOUS PRN
Status: DISCONTINUED | OUTPATIENT
Start: 2020-06-26 | End: 2020-06-26 | Stop reason: SDUPTHER

## 2020-06-26 RX ORDER — SODIUM CHLORIDE 0.9 % (FLUSH) 0.9 %
10 SYRINGE (ML) INJECTION EVERY 12 HOURS SCHEDULED
Status: DISCONTINUED | OUTPATIENT
Start: 2020-06-26 | End: 2020-06-26 | Stop reason: HOSPADM

## 2020-06-26 RX ADMIN — HYDROMORPHONE HYDROCHLORIDE 0.5 MG: 1 INJECTION, SOLUTION INTRAMUSCULAR; INTRAVENOUS; SUBCUTANEOUS at 15:00

## 2020-06-26 RX ADMIN — MIDAZOLAM 2 MG: 1 INJECTION INTRAMUSCULAR; INTRAVENOUS at 12:58

## 2020-06-26 RX ADMIN — HYDROMORPHONE HYDROCHLORIDE 0.5 MG: 1 INJECTION, SOLUTION INTRAMUSCULAR; INTRAVENOUS; SUBCUTANEOUS at 14:39

## 2020-06-26 RX ADMIN — SODIUM CHLORIDE: 9 INJECTION, SOLUTION INTRAVENOUS at 12:44

## 2020-06-26 RX ADMIN — ROCURONIUM BROMIDE 30 MG: 10 INJECTION, SOLUTION INTRAVENOUS at 13:16

## 2020-06-26 RX ADMIN — VANCOMYCIN HYDROCHLORIDE 1000 MG: 1 INJECTION, POWDER, LYOPHILIZED, FOR SOLUTION INTRAVENOUS at 12:50

## 2020-06-26 RX ADMIN — SODIUM CHLORIDE: 9 INJECTION, SOLUTION INTRAVENOUS at 10:40

## 2020-06-26 RX ADMIN — Medication 100 MG: at 12:59

## 2020-06-26 RX ADMIN — VANCOMYCIN HYDROCHLORIDE 1000 MG: 1 INJECTION, POWDER, LYOPHILIZED, FOR SOLUTION INTRAVENOUS at 11:39

## 2020-06-26 RX ADMIN — HYDROMORPHONE HYDROCHLORIDE 0.5 MG: 1 INJECTION, SOLUTION INTRAMUSCULAR; INTRAVENOUS; SUBCUTANEOUS at 14:46

## 2020-06-26 RX ADMIN — FENTANYL CITRATE 50 MCG: 50 INJECTION, SOLUTION INTRAMUSCULAR; INTRAVENOUS at 14:22

## 2020-06-26 RX ADMIN — FENTANYL CITRATE 100 MCG: 50 INJECTION, SOLUTION INTRAMUSCULAR; INTRAVENOUS at 12:59

## 2020-06-26 RX ADMIN — DEXAMETHASONE SODIUM PHOSPHATE 10 MG: 10 INJECTION INTRAMUSCULAR; INTRAVENOUS at 13:10

## 2020-06-26 RX ADMIN — LIDOCAINE HYDROCHLORIDE 100 MG: 20 INJECTION, SOLUTION INTRAVENOUS at 12:59

## 2020-06-26 RX ADMIN — HYDROMORPHONE HYDROCHLORIDE 0.5 MG: 1 INJECTION, SOLUTION INTRAMUSCULAR; INTRAVENOUS; SUBCUTANEOUS at 15:08

## 2020-06-26 RX ADMIN — SUGAMMADEX 300 MG: 100 INJECTION, SOLUTION INTRAVENOUS at 14:08

## 2020-06-26 RX ADMIN — PROPOFOL 100 MG: 10 INJECTION, EMULSION INTRAVENOUS at 12:59

## 2020-06-26 RX ADMIN — LABETALOL HYDROCHLORIDE 5 MG: 5 INJECTION INTRAVENOUS at 14:11

## 2020-06-26 RX ADMIN — FENTANYL CITRATE 50 MCG: 50 INJECTION, SOLUTION INTRAMUSCULAR; INTRAVENOUS at 14:15

## 2020-06-26 RX ADMIN — ONDANSETRON HYDROCHLORIDE 4 MG: 2 INJECTION, SOLUTION INTRAMUSCULAR; INTRAVENOUS at 13:46

## 2020-06-26 RX ADMIN — OXYCODONE AND ACETAMINOPHEN 2 TABLET: 5; 325 TABLET ORAL at 16:12

## 2020-06-26 RX ADMIN — FENTANYL CITRATE 50 MCG: 50 INJECTION, SOLUTION INTRAMUSCULAR; INTRAVENOUS at 14:24

## 2020-06-26 ASSESSMENT — PULMONARY FUNCTION TESTS
PIF_VALUE: 25
PIF_VALUE: 22
PIF_VALUE: 23
PIF_VALUE: 21
PIF_VALUE: 25
PIF_VALUE: 22
PIF_VALUE: 21
PIF_VALUE: 26
PIF_VALUE: 2
PIF_VALUE: 24
PIF_VALUE: 21
PIF_VALUE: 21
PIF_VALUE: 1
PIF_VALUE: 21
PIF_VALUE: 9
PIF_VALUE: 25
PIF_VALUE: 24
PIF_VALUE: 21
PIF_VALUE: 25
PIF_VALUE: 23
PIF_VALUE: 21
PIF_VALUE: 24
PIF_VALUE: 24
PIF_VALUE: 25
PIF_VALUE: 2
PIF_VALUE: 24
PIF_VALUE: 21
PIF_VALUE: 4
PIF_VALUE: 1
PIF_VALUE: 21
PIF_VALUE: 22
PIF_VALUE: 22
PIF_VALUE: 24
PIF_VALUE: 25
PIF_VALUE: 1
PIF_VALUE: 24
PIF_VALUE: 25
PIF_VALUE: 22
PIF_VALUE: 23
PIF_VALUE: 24
PIF_VALUE: 21
PIF_VALUE: 24
PIF_VALUE: 25
PIF_VALUE: 2
PIF_VALUE: 23
PIF_VALUE: 3
PIF_VALUE: 21
PIF_VALUE: 21
PIF_VALUE: 20
PIF_VALUE: 24
PIF_VALUE: 22
PIF_VALUE: 25
PIF_VALUE: 21
PIF_VALUE: 22
PIF_VALUE: 21
PIF_VALUE: 22
PIF_VALUE: 27
PIF_VALUE: 25
PIF_VALUE: 22
PIF_VALUE: 22
PIF_VALUE: 20
PIF_VALUE: 21
PIF_VALUE: 1
PIF_VALUE: 21
PIF_VALUE: 22
PIF_VALUE: 22
PIF_VALUE: 4

## 2020-06-26 ASSESSMENT — PAIN DESCRIPTION - PAIN TYPE
TYPE: SURGICAL PAIN

## 2020-06-26 ASSESSMENT — PAIN DESCRIPTION - LOCATION
LOCATION: ABDOMEN

## 2020-06-26 ASSESSMENT — PAIN DESCRIPTION - FREQUENCY
FREQUENCY: CONTINUOUS

## 2020-06-26 ASSESSMENT — PAIN DESCRIPTION - ONSET
ONSET: AWAKENED FROM SLEEP
ONSET: AWAKENED FROM SLEEP
ONSET: ON-GOING
ONSET: AWAKENED FROM SLEEP
ONSET: ON-GOING

## 2020-06-26 ASSESSMENT — PAIN SCALES - GENERAL
PAINLEVEL_OUTOF10: 7
PAINLEVEL_OUTOF10: 9
PAINLEVEL_OUTOF10: 9
PAINLEVEL_OUTOF10: 8
PAINLEVEL_OUTOF10: 5
PAINLEVEL_OUTOF10: 0
PAINLEVEL_OUTOF10: 4
PAINLEVEL_OUTOF10: 4
PAINLEVEL_OUTOF10: 0
PAINLEVEL_OUTOF10: 0

## 2020-06-26 ASSESSMENT — PAIN DESCRIPTION - DESCRIPTORS
DESCRIPTORS: ACHING;DISCOMFORT
DESCRIPTORS: BURNING;DISCOMFORT
DESCRIPTORS: ACHING;DISCOMFORT
DESCRIPTORS: BURNING;DISCOMFORT
DESCRIPTORS: BURNING;DISCOMFORT
DESCRIPTORS: ACHING;DISCOMFORT
DESCRIPTORS: ACHING;DISCOMFORT

## 2020-06-26 ASSESSMENT — LIFESTYLE VARIABLES: SMOKING_STATUS: 1

## 2020-06-26 ASSESSMENT — ENCOUNTER SYMPTOMS: SHORTNESS OF BREATH: 0

## 2020-06-26 ASSESSMENT — PAIN - FUNCTIONAL ASSESSMENT: PAIN_FUNCTIONAL_ASSESSMENT: 0-10

## 2020-06-26 NOTE — ANESTHESIA PRE PROCEDURE
O41.00X0    Kidney stones N20.0    Menses painful N94.6    Generalized abdominal pain R10.84    Opioid abuse, episodic ([facility]) F11.10    Tobacco use Z72.0    Diabetic ketoacidosis without coma associated with type 1 diabetes mellitus (HCC) E10.10    Septicemia ([address] A41.9    Hypoglycemia E16.2    Hypertension I10    DKA, type 1, not at goal [facility]) E10.10    Hyponatremia E87.1    Other specified diabetes mellitus with other specified complication ([address] E95.19    First trimester pregnancy Z34.91    Acute electrocardiogram changes R94.31    Acute kidney injury superimposed on CKD ([facility]) N17.9, N18.9    Diabetic gastroparesis associated with type 1 diabetes mellitus ([address] E10.43, K31.84    Diarrhea in adult patient R19.7    Generalized abdominal pain R10.84    Acute kidney injury superimposed on chronic kidney disease (HCC) N17.9, N18.9    Acute gastroenteritis K52.9    High anion gap metabolic acidosis N38.3    Iron deficiency anemia D50.9    Headache R51    Acute respiratory failure with hypoxia ([facility]) J96.01    Aspiration pneumonia of both lungs ([facility]) J69.0    Acute cholecystitis K81.0    Chest pain, unspecified R07.9    Sinus tachycardia R00.0    Orthostatic hypotension I95.1    Bladder dysfunction N31.9    C. difficile colitis A04.72    Altered mental status R41.82    Acute heart failure with preserved ejection fraction ([facility]) I50.31    Hypervolemia E87.70    Chronic kidney disease N18.9    Hypokalemia E87.6    Cellulitis of right hand L03. 113    Mild protein-calorie malnutrition (HCC) E44.1    Chronic renal impairment N18.9    Acute congestive heart failure (HCC) I50.9    Hypertensive encephalopathy I67.4       Past Medical History:        Diagnosis Date    Acute congestive heart failure ([facility] [address]     BC (acute kidney injury) ([facility] [address] 10/1/2019    Cephalgia 10/9/2019    Chronic kidney disease     Depression     Diabetes mellitus ([address]     Diabetic ketoacidosis ([facility] [address] 8/27/2011  Hemodialysis patient [facility])     History of blood transfusion 2019    Iron deficiency anemia 10/1/2019    MDRO (multiple drug resistant organisms) resistance     MRSA (methicillin resistant Staphylococcus aureus)     back wound abcess    Non compliance w medication regimen 3/30/2016    Other disorders of kidney and ureter     Pregnancy 3/30/2016    16 weeks    Severe pre-eclampsia in third trimester 2016       Past Surgical History:        Procedure Laterality Date    BACK SURGERY      abscess     SECTION      x2    CHOLECYSTECTOMY, LAPAROSCOPIC N/A 2019    CHOLECYSTECTOMY LAPAROSCOPIC performed by Chas Almanza MD at [facility] N/A 2018    COLONOSCOPY WITH BIOPSY performed by Wenceslao Garcia MD at [address] [facility] N/A 2018    COLONOSCOPY WITH BIOPSY performed by Aixa Campos MD at [address] [facility]  2012    EF 57%    ECHO COMPL W DOP COLOR FLOW  6/10/2013         [place] ESOPHAGOGASTRODUODENOSCOPY TRANSORAL DIAGNOSTIC N/A 2018    EGD ESOPHAGOGASTRODUODENOSCOPY performed by Wenceslao Garcia MD at [address] [facility] [address] [facility]  2018    UPPER GASTROINTESTINAL ENDOSCOPY  2018    EGD BIOPSY performed by Aixa Campos MD at [address] [facility] N/A 10/11/2019    EGD ESOPHAGOGASTRODUODENOSCOPY performed by Amelia Correa DO at [address] [facility] History:    Social History     Tobacco Use    Smoking status: Current Every Day Smoker     Packs/day: 0.50     Years: 6.00     Pack years: 3.00     Types: Cigarettes    Smokeless tobacco: Current User   Substance Use Topics    Alcohol use: No                                Ready to quit: Not Answered  Counseling given: Not Answered      Vital Signs (Current): There were no vitals filed for this visit.                                            BP Readings from Last 3 Encounters:   20 (!)

## 2020-06-26 NOTE — PROGRESS NOTES
Patient admitted to PACU and paced on appropriate monitors. Patient on 40% face mask. Airway patent at this time. Report obtaind from CRNA. Warm blankets applied.

## 2020-06-26 NOTE — DISCHARGE INSTR - COC
Continuity of Care Form    Patient Name: Lorena Cortez   :  1990  MRN:  [id number]    Admit date:  2020  Discharge date:  ***    Code Status Order: Full Code   Advance Directives:     Admitting Physician:  Anaya Rothman MD  PCP: Serjio Tompkins MD    Discharging Nurse: [facility] Unit/Room#: [address]  Discharging Unit Phone Number: ***    Emergency Contact:   Extended Emergency Contact Information  Primary Emergency Contact: [address] Phone: [phone]  Mobile Phone: [phone]  Relation: Parent   needed?  No    Past Surgical History:  Past Surgical History:   Procedure Laterality Date    BACK SURGERY      abscess     SECTION      x2    CHOLECYSTECTOMY, LAPAROSCOPIC N/A 2019    CHOLECYSTECTOMY LAPAROSCOPIC performed by Jelena Pascal MD at [address] [facility] N/A 2018    COLONOSCOPY WITH BIOPSY performed by Lety Arguello MD at [address] [facility] N/A 2018    COLONOSCOPY WITH BIOPSY performed by Bozena Parikh MD at [address] [facility] [address]  2012    EF 57%    ECHO COMPL W DOP COLOR FLOW  6/10/2013         [place] ESOPHAGOGASTRODUODENOSCOPY TRANSORAL DIAGNOSTIC N/A 2018    EGD ESOPHAGOGASTRODUODENOSCOPY performed by Lety Arguello MD at [address] [facility] [address] [facility]  2018    UPPER GASTROINTESTINAL ENDOSCOPY  2018    EGD BIOPSY performed by Bozena Parikh MD at [address] [facility] [address] 10/11/2019    EGD ESOPHAGOGASTRODUODENOSCOPY performed by Braeden Plunkett DO at [facility] ENDOSCOPY       Immunization History:   Immunization History   Administered Date(s) Administered    Influenza Virus Vaccine 10/22/2011, 10/23/2012    Influenza, Quadv, 6 mo and older, IM, PF (Flulaval, Fluarix) 12/15/2018    Influenza, Quadv, IM, PF (6 mo and older Fluzone, Flulaval, Fluarix, and 3 yrs and older Afluria) 2017, 2017    Tdap (Boostrix, Adacel) 2016, 2016, 2017       Active Problems:  Patient Active Problem List   Diagnosis Code    High-risk pregnancy O09.90    Previous stillbirth or demise, antepartum O09.299    Non compliance w medication regimen Z91.14    Dyspnea on exertion R06.09    Tobacco smoking complicating pregnancy J80.481    Drug use complicating pregnancy in third trimester O99.323    MTHFR mutation ([facility] [address] E72.12    Poorly controlled type 1 diabetes mellitus ([address] E10.65    Diabetes mellitus type 1, uncontrolled ([address] E10.65    Previous  delivery affecting pregnancy, antepartum O34.219    Oligohydramnios O41.00X0    Kidney stones N20.0    Menses painful N94.6    Generalized abdominal pain R10.84    Opioid abuse, episodic ([facility]) F11.10    Tobacco use Z72.0    Diabetic ketoacidosis without coma associated with type 1 diabetes mellitus ([address] E10.10    Septicemia ([address] A41.9    Hypoglycemia E16.2    Hypertension I10    DKA, type 1, not at goal ([address] E10.10    Hyponatremia E87.1    Other specified diabetes mellitus with other specified complication ([address] G12.76    First trimester pregnancy Z34.91    Acute electrocardiogram changes R94.31    Acute kidney injury superimposed on CKD ([facility]) N17.9, N18.9    Diabetic gastroparesis associated with type 1 diabetes mellitus ([address] E10.43, K31.84    Diarrhea in adult patient R19.7    Generalized abdominal pain R10.84    Acute kidney injury superimposed on chronic kidney disease ([address] N17.9, N18.9    Acute gastroenteritis K52.9    High anion gap metabolic acidosis A43.6    Iron deficiency anemia D50.9    Headache R51    Acute respiratory failure with hypoxia ([facility]) J96.01    Aspiration pneumonia of both lungs ([facility]) J69.0    Acute cholecystitis K81.0    Chest pain, unspecified R07.9    Sinus tachycardia R00.0    Orthostatic hypotension I95.1    Bladder dysfunction N31.9    C. difficile colitis A04.72    Altered mental status {Prognosis:8106545146}    Recommended Labs or Other Treatments After Discharge: ***    Physician Certification: I certify the above information and transfer of [address]  is necessary for the continuing treatment of the diagnosis listed and that she requires {Admit to Appropriate Level of Care:98874} for {GREATER/LESS:790477416} 30 days.      Update Admission H&P: {CHP DME Changes in VBCCE:[id number]}    PHYSICIAN SIGNATURE:  {Esignature:[id number]}

## 2020-06-27 NOTE — PROCEDURES
[address]                  [address] [place],  [facility]                                 PROCEDURE NOTE    PATIENT NAME: Ana Officer                 :        1990  MED REC NO:   [id number]                            ROOM:  ACCOUNT NO:   [de-identified]                           ADMIT DATE: 2020  PROVIDER:     Ijeoma Mcdaniel MD    DATE OF PROCEDURE:  2020    PREOPERATIVE DIAGNOSIS:  ESRD, extensive abdominal adhesions. PROCEDURE:  Consisted of lysis of extensive abdominal adhesions and  placement of PD catheter. SURGEON:  Ijeoma Mcdaniel MD    OPERATIVE PROCEDURE:  Procedure was carried out under general  anesthesia, endotracheal intubation. The abdomen was prepped and draped  in the usual fashion following which a left upper quadrant approach was  carried out with the Veress needle in position. Abdomen was insufflated  to 10 cm of water pressure. A second trocar was required on the right  side of the abdomen in order to lyse all the midline adhesions as well  as the pelvic adhesions allowing to create a space for the placement of  the PD catheter, after which, a supraumbilical trocar was placed and  this was tunneled to the right side of the abdomen. The peritoneal cuff  was made to straddle the fascia and the catheter was secured to the  anterior abdominal wall with 3-0 nylon transfixion stitch. Catheter was  positioned in the pelvic area port with the left side flushing was  carried out quite effectively without any issues. Blood loss was  minimal.  All ports were removed in succession. Primary closure was  carried out with 4-0 Vicryl and Dermaflex after which the catheter was  repeatedly flushed until the fluid return was pinkish rather than dark  red. The patient tolerated the procedure well as mentioned.   Instrument  and lap counts were reported as correct, and the patient was discharged,  to be followed up on outpatient basis in my office within a short period  of time for further management as needed.         Suzanne Bhardwaj MD    D: 06/26/2020 16:11:24       T: 06/26/2020 16:22:41     [email or web address]  Job#: [id number]     Doc#: [id number]    CC:

## 2020-07-13 ENCOUNTER — HOSPITAL ENCOUNTER (OUTPATIENT)
Age: 30
Discharge: HOME | DRG: 048 | End: 2020-07-15
Payer: COMMERCIAL

## 2020-07-13 LAB
ALBUMIN SERPL-MCNC: 3 G/DL (ref 3.5–5.2)
ALP BLD-CCNC: 153 U/L (ref 35–104)
ALT SERPL-CCNC: 10 U/L (ref 0–32)
ANION GAP SERPL CALCULATED.3IONS-SCNC: 14 MMOL/L (ref 7–16)
AST SERPL-CCNC: 17 U/L (ref 0–31)
BILIRUB SERPL-MCNC: <0.2 MG/DL (ref 0–1.2)
BUN BLDV-MCNC: 18 MG/DL (ref 6–20)
CALCIUM SERPL-MCNC: 9.3 MG/DL (ref 8.6–10.2)
CHLORIDE BLD-SCNC: 100 MMOL/L (ref 98–107)
CO2: 30 MMOL/L (ref 22–29)
CREAT SERPL-MCNC: 4.2 MG/DL (ref 0.5–1)
GFR AFRICAN AMERICAN: 15
GFR NON-AFRICAN AMERICAN: 15 ML/MIN/1.73
GLUCOSE BLD-MCNC: 305 MG/DL (ref 74–99)
POTASSIUM SERPL-SCNC: 3.1 MMOL/L (ref 3.5–5)
SODIUM BLD-SCNC: 144 MMOL/L (ref 132–146)
TOTAL PROTEIN: 5.9 G/DL (ref 6.4–8.3)

## 2020-07-13 PROCEDURE — 80053 COMPREHEN METABOLIC PANEL: CPT

## 2020-07-14 ENCOUNTER — HOSPITAL ENCOUNTER (EMERGENCY)
Age: 30
Discharge: HOME | End: 2020-07-15
Attending: EMERGENCY MEDICINE
Payer: COMMERCIAL

## 2020-07-14 LAB
ANION GAP SERPL CALCULATED.3IONS-SCNC: 11 MMOL/L (ref 7–16)
BACTERIA: ABNORMAL /HPF
BASOPHILS ABSOLUTE: 0.12 E9/L (ref 0–0.2)
BASOPHILS RELATIVE PERCENT: 1.6 % (ref 0–2)
BILIRUBIN URINE: NEGATIVE
BLOOD, URINE: ABNORMAL
BUN BLDV-MCNC: 20 MG/DL (ref 6–20)
CALCIUM SERPL-MCNC: 8.9 MG/DL (ref 8.6–10.2)
CHLORIDE BLD-SCNC: 101 MMOL/L (ref 98–107)
CHP ED QC CHECK: NORMAL
CLARITY: ABNORMAL
CO2: 30 MMOL/L (ref 22–29)
COLOR: YELLOW
CREAT SERPL-MCNC: 5.2 MG/DL (ref 0.5–1)
EOSINOPHILS ABSOLUTE: 0.19 E9/L (ref 0.05–0.5)
EOSINOPHILS RELATIVE PERCENT: 2.6 % (ref 0–6)
EPITHELIAL CELLS, UA: ABNORMAL /HPF
GFR AFRICAN AMERICAN: 12
GFR NON-AFRICAN AMERICAN: 12 ML/MIN/1.73
GLUCOSE BLD-MCNC: 225 MG/DL
GLUCOSE BLD-MCNC: 253 MG/DL (ref 74–99)
GLUCOSE URINE: >=1000 MG/DL
HCG, URINE, POC: NEGATIVE
HCT VFR BLD CALC: 38.7 % (ref 34–48)
HEMOGLOBIN: 12 G/DL (ref 11.5–15.5)
HYALINE CASTS: ABNORMAL /LPF (ref 0–2)
IMMATURE GRANULOCYTES #: 0.02 E9/L
IMMATURE GRANULOCYTES %: 0.3 % (ref 0–5)
KETONES, URINE: ABNORMAL MG/DL
LACTIC ACID: 1.8 MMOL/L (ref 0.5–2.2)
LEUKOCYTE ESTERASE, URINE: NEGATIVE
LYMPHOCYTES ABSOLUTE: 2.05 E9/L (ref 1.5–4)
LYMPHOCYTES RELATIVE PERCENT: 27.8 % (ref 20–42)
Lab: NORMAL
MAGNESIUM: 2.1 MG/DL (ref 1.6–2.6)
MCH RBC QN AUTO: 31.1 PG (ref 26–35)
MCHC RBC AUTO-ENTMCNC: 31 % (ref 32–34.5)
MCV RBC AUTO: 100.3 FL (ref 80–99.9)
METER GLUCOSE: 225 MG/DL (ref 74–99)
MONOCYTES ABSOLUTE: 0.5 E9/L (ref 0.1–0.95)
MONOCYTES RELATIVE PERCENT: 6.8 % (ref 2–12)
NEGATIVE QC PASS/FAIL: NORMAL
NEUTROPHILS ABSOLUTE: 4.49 E9/L (ref 1.8–7.3)
NEUTROPHILS RELATIVE PERCENT: 60.9 % (ref 43–80)
NITRITE, URINE: NEGATIVE
PDW BLD-RTO: 14 FL (ref 11.5–15)
PH UA: 5.5 (ref 5–9)
PH VENOUS: 7.37 (ref 7.35–7.45)
PHOSPHORUS: 2.2 MG/DL (ref 2.5–4.5)
PLATELET # BLD: 269 E9/L (ref 130–450)
PMV BLD AUTO: 11.2 FL (ref 7–12)
POSITIVE QC PASS/FAIL: NORMAL
POTASSIUM REFLEX MAGNESIUM: 4.2 MMOL/L (ref 3.5–5)
PROTEIN UA: >=300 MG/DL
RBC # BLD: 3.86 E12/L (ref 3.5–5.5)
RBC UA: ABNORMAL /HPF (ref 0–2)
SODIUM BLD-SCNC: 142 MMOL/L (ref 132–146)
SPECIFIC GRAVITY UA: 1.02 (ref 1–1.03)
TROPONIN: 0.06 NG/ML (ref 0–0.03)
UROBILINOGEN, URINE: 0.2 E.U./DL
WBC # BLD: 7.4 E9/L (ref 4.5–11.5)
WBC UA: >20 /HPF (ref 0–5)

## 2020-07-14 PROCEDURE — 82010 KETONE BODYS QUAN: CPT

## 2020-07-14 PROCEDURE — 2580000003 HC RX 258: Performed by: STUDENT IN AN ORGANIZED HEALTH CARE EDUCATION/TRAINING PROGRAM

## 2020-07-14 PROCEDURE — 99284 EMERGENCY DEPT VISIT MOD MDM: CPT

## 2020-07-14 PROCEDURE — 87088 URINE BACTERIA CULTURE: CPT

## 2020-07-14 PROCEDURE — 82962 GLUCOSE BLOOD TEST: CPT

## 2020-07-14 PROCEDURE — 6360000002 HC RX W HCPCS: Performed by: EMERGENCY MEDICINE

## 2020-07-14 PROCEDURE — 83735 ASSAY OF MAGNESIUM: CPT

## 2020-07-14 PROCEDURE — 85025 COMPLETE CBC W/AUTO DIFF WBC: CPT

## 2020-07-14 PROCEDURE — 96374 THER/PROPH/DIAG INJ IV PUSH: CPT

## 2020-07-14 PROCEDURE — 82800 BLOOD PH: CPT

## 2020-07-14 PROCEDURE — 80048 BASIC METABOLIC PNL TOTAL CA: CPT

## 2020-07-14 PROCEDURE — 84484 ASSAY OF TROPONIN QUANT: CPT

## 2020-07-14 PROCEDURE — 84100 ASSAY OF PHOSPHORUS: CPT

## 2020-07-14 PROCEDURE — 83605 ASSAY OF LACTIC ACID: CPT

## 2020-07-14 PROCEDURE — 81001 URINALYSIS AUTO W/SCOPE: CPT

## 2020-07-14 RX ORDER — CIPROFLOXACIN 500 MG/1
500 TABLET, FILM COATED ORAL 2 TIMES DAILY
COMMUNITY
End: 2020-07-29

## 2020-07-14 RX ORDER — ONDANSETRON 2 MG/ML
4 INJECTION INTRAMUSCULAR; INTRAVENOUS ONCE
Status: COMPLETED | OUTPATIENT
Start: 2020-07-14 | End: 2020-07-14

## 2020-07-14 RX ORDER — POTASSIUM CHLORIDE 1.5 G/1.77G
POWDER, FOR SOLUTION ORAL
COMMUNITY
End: 2020-07-29

## 2020-07-14 RX ORDER — 0.9 % SODIUM CHLORIDE 0.9 %
1000 INTRAVENOUS SOLUTION INTRAVENOUS ONCE
Status: COMPLETED | OUTPATIENT
Start: 2020-07-14 | End: 2020-07-15

## 2020-07-14 RX ADMIN — SODIUM CHLORIDE 1000 ML: 9 INJECTION, SOLUTION INTRAVENOUS at 21:56

## 2020-07-14 RX ADMIN — ONDANSETRON 4 MG: 2 INJECTION INTRAMUSCULAR; INTRAVENOUS at 21:58

## 2020-07-14 ASSESSMENT — ENCOUNTER SYMPTOMS
COUGH: 0
VOMITING: 1
DIARRHEA: 0
NAUSEA: 1
RHINORRHEA: 0
SHORTNESS OF BREATH: 0
ABDOMINAL PAIN: 1

## 2020-07-14 ASSESSMENT — PAIN DESCRIPTION - PAIN TYPE: TYPE: ACUTE PAIN

## 2020-07-14 ASSESSMENT — PAIN DESCRIPTION - PROGRESSION: CLINICAL_PROGRESSION: NOT CHANGED

## 2020-07-14 ASSESSMENT — PAIN DESCRIPTION - FREQUENCY: FREQUENCY: CONTINUOUS

## 2020-07-14 ASSESSMENT — PAIN DESCRIPTION - LOCATION: LOCATION: GENERALIZED

## 2020-07-14 ASSESSMENT — PAIN DESCRIPTION - DESCRIPTORS: DESCRIPTORS: ACHING

## 2020-07-15 VITALS
HEIGHT: 63 IN | OXYGEN SATURATION: 99 % | SYSTOLIC BLOOD PRESSURE: 160 MMHG | RESPIRATION RATE: 18 BRPM | TEMPERATURE: 97.6 F | BODY MASS INDEX: 19.49 KG/M2 | HEART RATE: 97 BPM | WEIGHT: 110 LBS | DIASTOLIC BLOOD PRESSURE: 100 MMHG

## 2020-07-15 LAB — BETA-HYDROXYBUTYRATE: 0.35 MMOL/L (ref 0.02–0.27)

## 2020-07-15 PROCEDURE — 2580000003 HC RX 258: Performed by: STUDENT IN AN ORGANIZED HEALTH CARE EDUCATION/TRAINING PROGRAM

## 2020-07-15 PROCEDURE — 6360000002 HC RX W HCPCS: Performed by: EMERGENCY MEDICINE

## 2020-07-15 PROCEDURE — 6360000002 HC RX W HCPCS: Performed by: STUDENT IN AN ORGANIZED HEALTH CARE EDUCATION/TRAINING PROGRAM

## 2020-07-15 RX ORDER — HEPARIN SODIUM (PORCINE) LOCK FLUSH IV SOLN 100 UNIT/ML 100 UNIT/ML
100 SOLUTION INTRAVENOUS ONCE
Status: COMPLETED | OUTPATIENT
Start: 2020-07-15 | End: 2020-07-15

## 2020-07-15 RX ADMIN — Medication 100 UNITS: at 01:14

## 2020-07-15 NOTE — ED PROVIDER NOTES
Patient is a 62-year-old female with history of IDDM, hypertension, CKD who presents to the emergency department with malaise, nausea vomiting, hyperglycemia. Patient states she has had elevated blood glucose readings above her monitors threshold for the past week to 10 days and has been treating with sliding scale insulin. Patient's last reading was greater than 500 today and took 6 units of NovoLog 2 hours prior to arrival.  Patient describes nausea vomiting, without blood, with malaise. Patient denies fever, chills, vertigo, syncope. Patient does endorse headaches, decreased appetite, decreased p.o. fluid intake. Patient denies any urinary symptoms or other GI symptoms. Patient does have a history of DKA. She denies any illness prior to the symptoms starting. Patient also endorses 40 pound weight loss over the past 30 days, unintentional.           Review of Systems   Constitutional: Positive for appetite change and fatigue. Negative for chills and fever. HENT: Negative for congestion and rhinorrhea. Respiratory: Negative for cough and shortness of breath. Cardiovascular: Negative for chest pain. Gastrointestinal: Positive for abdominal pain, nausea and vomiting. Negative for diarrhea. Genitourinary: Negative for dysuria, frequency and urgency. Musculoskeletal: Negative for arthralgias and myalgias. Skin: Negative for rash. Neurological: Positive for headaches. Negative for dizziness, weakness, light-headedness and numbness. Physical Exam  Vitals signs and nursing note reviewed. Constitutional:       General: She is awake. She is not in acute distress. Appearance: She is underweight. She is ill-appearing. She is not toxic-appearing. HENT:      Head: Normocephalic and atraumatic. Nose: Nose normal.      Mouth/Throat:      Mouth: Mucous membranes are moist.      Pharynx: Oropharynx is clear. Eyes:      Extraocular Movements: Extraocular movements intact. able to continue on previously prescribed antibiotics. Discussion with patient regarding continuing antibiotics and patient verbally agrees. Patient given hydration and Zofran with positive effects. Patient is stable on reevaluation and improving. Discussion with patient regarding plan for discharge and follow-up with establishing PCP. Patient agrees with plan and was given PCP for which to establish. All questions were answered at the bedside and patient verbally understood reasons to come back to the emergency department. Patient was stable on discharge.    --------------------------------------------- PAST HISTORY ---------------------------------------------  Past Medical History:  has a past medical history of Acute congestive heart failure ([facility] BC (acute kidney injury) ([facility] Cephalgia, Chronic kidney disease, Depression, Diabetes mellitus ([facility] Diabetic ketoacidosis ([facility] Hemodialysis patient ([facility] History of blood transfusion, Iron deficiency anemia, MDRO (multiple drug resistant organisms) resistance, MRSA (methicillin resistant Staphylococcus aureus), Non compliance w medication regimen, Other disorders of kidney and ureter, Pregnancy, and Severe pre-eclampsia in third trimester. Past Surgical History:  has a past surgical history that includes ECHO Compl W Dop Color Flow (2012); ECHO Compl W Dop Color Flow (6/10/2013);  section; Tunneled venous port placement (2018); pr esophagogastroduodenoscopy transoral diagnostic (N/A, 2018); back surgery; Colonoscopy (N/A, 2018); Colonoscopy (N/A, 2018); Upper gastrointestinal endoscopy (2018); Upper gastrointestinal endoscopy (N/A, 10/11/2019); Cholecystectomy, laparoscopic (N/A, 2019); and LAPAROSCOPY INSERTION PERITONEAL CATHETER (N/A, 2020). Social History:  reports that she has been smoking cigarettes. She has a 3.00 pack-year smoking history.  She uses smokeless tobacco. She reports that she does Yellow Straw/Yellow    Clarity, UA SLCLOUDY Clear    Glucose, Ur >=1000 (A) Negative mg/dL    Bilirubin Urine Negative Negative    Ketones, Urine TRACE (A) Negative mg/dL    Specific Gravity, UA 1.025 1.005 - 1.030    Blood, Urine MODERATE (A) Negative    pH, UA 5.5 5.0 - 9.0    Protein, UA >=300 (A) Negative mg/dL    Urobilinogen, Urine 0.2 <2.0 E.U./dL    Nitrite, Urine Negative Negative    Leukocyte Esterase, Urine Negative Negative   Troponin   Result Value Ref Range    Troponin 0.06 (H) 0.00 - 0.03 ng/mL   Lactic Acid, Plasma   Result Value Ref Range    Lactic Acid 1.8 0.5 - 2.2 mmol/L   pH, venous   Result Value Ref Range    pH, Khurram 7.37 7.35 - 7.45   Magnesium   Result Value Ref Range    Magnesium 2.1 1.6 - 2.6 mg/dL   Phosphorus   Result Value Ref Range    Phosphorus 2.2 (L) 2.5 - 4.5 mg/dL   Microscopic Urinalysis   Result Value Ref Range    Hyaline Casts, UA 3-5 (A) 0 - 2 /LPF    WBC, UA >20 (A) 0 - 5 /HPF    RBC, UA 5-10 (A) 0 - 2 /HPF    Epithelial Cells, UA RARE /HPF    Bacteria, UA MANY (A) None Seen /HPF   POCT Glucose   Result Value Ref Range    Glucose 225 mg/dL    QC OK? OK    POCT Glucose   Result Value Ref Range    Meter Glucose 225 (H) 74 - 99 mg/dL   POC Pregnancy Urine Qual   Result Value Ref Range    HCG, Urine, POC Negative Negative    Lot Number [id number]     Positive QC Pass/Fail Pass     Negative QC Pass/Fail Pass        Radiology:  No orders to display     ------------------------- NURSING NOTES AND VITALS REVIEWED ---------------------------  Date / Time Roomed:  7/14/2020  9:25 PM  ED Bed Assignment:  07/07    The nursing notes within the ED encounter and vital signs as below have been reviewed.    BP (!) 160/100   Pulse 97   Temp 97.6 °F (36.4 °C) (Oral)   Resp 18   Ht 5' 3\" (1.6 m)   Wt 110 lb (49.9 kg)   LMP 06/15/2020   SpO2 99%   BMI 19.49 kg/m²   Oxygen Saturation Interpretation: Normal      ------------------------------------------ PROGRESS NOTES ------------------------------------------  12:59 AM EDT  I have spoken with the patient and discussed todays results, in addition to providing specific details for the plan of care and counseling regarding the diagnosis and prognosis. Their questions are answered at this time and they are agreeable with the plan. I discussed at length with them reasons for immediate return here for re evaluation. They will followup with their primary care physician by calling their office to establish.      --------------------------------- ADDITIONAL PROVIDER NOTES ---------------------------------  At this time the patient is without objective evidence of an acute process requiring hospitalization or inpatient management. They have remained hemodynamically stable throughout their entire ED visit and are stable for discharge with outpatient follow-up. The plan has been discussed in detail and they are aware of the specific conditions for emergent return, as well as the importance of follow-up. Discharge Medication List as of 7/15/2020  1:07 AM          Diagnosis:  1. Acute UTI    2. General weakness        Disposition:  Patient's disposition: Discharge to home  Patient's condition is stable. 7/15/20, 12:59 AM EDT. This note is prepared by Pramod Sandoval MD -[id number]             Pramod Sandoval MD  Resident  07/15/20 1041    ATTENDING PROVIDER ATTESTATION:     I have personally performed and/or participated in the history, exam, medical decision making, and procedures and agree with all pertinent clinical information. I have also reviewed and agree with the past medical, family and social history unless otherwise noted. I have discussed this patient in detail with the resident, and provided the instruction and education regarding nausea, vomiting, weight loss, hyperglycemia, diabetic ketoacidosis and end stage renal failure. My findings/Plan: Tachycardic. Lungs CTA bilaterally. Abdomen soft, nontender. Bowel sounds normal. Supportive care. Discharge for outpatient follow up.            [address] [facility],   07/16/20 0562

## 2020-07-16 ENCOUNTER — APPOINTMENT (OUTPATIENT)
Dept: GENERAL RADIOLOGY | Age: 30
DRG: 048 | End: 2020-07-16
Payer: COMMERCIAL

## 2020-07-16 ENCOUNTER — TELEPHONE (OUTPATIENT)
Dept: ENDOCRINOLOGY | Age: 30
End: 2020-07-16

## 2020-07-16 ENCOUNTER — HOSPITAL ENCOUNTER (OUTPATIENT)
Age: 30
Discharge: HOME | DRG: 048 | End: 2020-07-18
Payer: COMMERCIAL

## 2020-07-16 ENCOUNTER — CARE COORDINATION (OUTPATIENT)
Dept: CARE COORDINATION | Age: 30
End: 2020-07-16

## 2020-07-16 ENCOUNTER — HOSPITAL ENCOUNTER (INPATIENT)
Age: 30
LOS: 5 days | Discharge: HOME HEALTH | DRG: 048 | End: 2020-07-21
Attending: EMERGENCY MEDICINE | Admitting: INTERNAL MEDICINE
Payer: COMMERCIAL

## 2020-07-16 PROBLEM — R11.2 INTRACTABLE VOMITING WITH NAUSEA: Status: ACTIVE | Noted: 2020-07-16

## 2020-07-16 LAB
ALBUMIN SERPL-MCNC: 2.9 G/DL (ref 3.5–5.2)
ALP BLD-CCNC: 162 U/L (ref 35–104)
ALT SERPL-CCNC: 9 U/L (ref 0–32)
ANION GAP SERPL CALCULATED.3IONS-SCNC: 10 MMOL/L (ref 7–16)
ANION GAP SERPL CALCULATED.3IONS-SCNC: 11 MMOL/L (ref 7–16)
ANION GAP SERPL CALCULATED.3IONS-SCNC: 14 MMOL/L (ref 7–16)
AST SERPL-CCNC: 11 U/L (ref 0–31)
BASOPHILS ABSOLUTE: 0.14 E9/L (ref 0–0.2)
BASOPHILS RELATIVE PERCENT: 1.2 % (ref 0–2)
BETA-HYDROXYBUTYRATE: 0.7 MMOL/L (ref 0.02–0.27)
BILIRUB SERPL-MCNC: <0.2 MG/DL (ref 0–1.2)
BUN BLDV-MCNC: 21 MG/DL (ref 6–20)
BUN BLDV-MCNC: 21 MG/DL (ref 6–20)
BUN BLDV-MCNC: 23 MG/DL (ref 6–20)
CALCIUM SERPL-MCNC: 8.5 MG/DL (ref 8.6–10.2)
CALCIUM SERPL-MCNC: 8.9 MG/DL (ref 8.6–10.2)
CALCIUM SERPL-MCNC: 9 MG/DL (ref 8.6–10.2)
CHLORIDE BLD-SCNC: 102 MMOL/L (ref 98–107)
CHLORIDE BLD-SCNC: 106 MMOL/L (ref 98–107)
CHLORIDE BLD-SCNC: 97 MMOL/L (ref 98–107)
CHP ED QC CHECK: NORMAL
CO2: 23 MMOL/L (ref 22–29)
CO2: 27 MMOL/L (ref 22–29)
CO2: 29 MMOL/L (ref 22–29)
CREAT SERPL-MCNC: 5.1 MG/DL (ref 0.5–1)
CREAT SERPL-MCNC: 5.2 MG/DL (ref 0.5–1)
CREAT SERPL-MCNC: 5.3 MG/DL (ref 0.5–1)
EOSINOPHILS ABSOLUTE: 0.16 E9/L (ref 0.05–0.5)
EOSINOPHILS RELATIVE PERCENT: 1.4 % (ref 0–6)
GFR AFRICAN AMERICAN: 12
GFR NON-AFRICAN AMERICAN: 12 ML/MIN/1.73
GLUCOSE BLD-MCNC: 161 MG/DL
GLUCOSE BLD-MCNC: 187 MG/DL (ref 74–99)
GLUCOSE BLD-MCNC: 42 MG/DL (ref 74–99)
GLUCOSE BLD-MCNC: 609 MG/DL (ref 74–99)
HCT VFR BLD CALC: 33.3 % (ref 34–48)
HCT VFR BLD CALC: 34.9 % (ref 34–48)
HEMOGLOBIN: 10.2 G/DL (ref 11.5–15.5)
HEMOGLOBIN: 11.1 G/DL (ref 11.5–15.5)
IMMATURE GRANULOCYTES #: 0.03 E9/L
IMMATURE GRANULOCYTES %: 0.3 % (ref 0–5)
LACTIC ACID: 1.2 MMOL/L (ref 0.5–2.2)
LIPASE: 6 U/L (ref 13–60)
LYMPHOCYTES ABSOLUTE: 1.69 E9/L (ref 1.5–4)
LYMPHOCYTES RELATIVE PERCENT: 15.1 % (ref 20–42)
MCH RBC QN AUTO: 31 PG (ref 26–35)
MCHC RBC AUTO-ENTMCNC: 31.8 % (ref 32–34.5)
MCV RBC AUTO: 97.5 FL (ref 80–99.9)
METER GLUCOSE: 161 MG/DL (ref 74–99)
MONOCYTES ABSOLUTE: 0.65 E9/L (ref 0.1–0.95)
MONOCYTES RELATIVE PERCENT: 5.8 % (ref 2–12)
NEUTROPHILS ABSOLUTE: 8.55 E9/L (ref 1.8–7.3)
NEUTROPHILS RELATIVE PERCENT: 76.2 % (ref 43–80)
PDW BLD-RTO: 14.2 FL (ref 11.5–15)
PH VENOUS: 7.37 (ref 7.35–7.45)
PLATELET # BLD: 265 E9/L (ref 130–450)
PMV BLD AUTO: 10.3 FL (ref 7–12)
POTASSIUM REFLEX MAGNESIUM: 4.1 MMOL/L (ref 3.5–5)
POTASSIUM SERPL-SCNC: 3.8 MMOL/L (ref 3.5–5)
POTASSIUM SERPL-SCNC: 6.6 MMOL/L (ref 3.5–5)
PRO-BNP: 4696 PG/ML (ref 0–125)
RBC # BLD: 3.58 E12/L (ref 3.5–5.5)
SODIUM BLD-SCNC: 134 MMOL/L (ref 132–146)
SODIUM BLD-SCNC: 140 MMOL/L (ref 132–146)
SODIUM BLD-SCNC: 145 MMOL/L (ref 132–146)
TOTAL PROTEIN: 5.7 G/DL (ref 6.4–8.3)
TROPONIN: 0.06 NG/ML (ref 0–0.03)
WBC # BLD: 11.2 E9/L (ref 4.5–11.5)

## 2020-07-16 PROCEDURE — 36415 COLL VENOUS BLD VENIPUNCTURE: CPT

## 2020-07-16 PROCEDURE — 82010 KETONE BODYS QUAN: CPT

## 2020-07-16 PROCEDURE — 82962 GLUCOSE BLOOD TEST: CPT

## 2020-07-16 PROCEDURE — 83605 ASSAY OF LACTIC ACID: CPT

## 2020-07-16 PROCEDURE — 94760 N-INVAS EAR/PLS OXIMETRY 1: CPT

## 2020-07-16 PROCEDURE — G0378 HOSPITAL OBSERVATION PER HR: HCPCS

## 2020-07-16 PROCEDURE — 82800 BLOOD PH: CPT

## 2020-07-16 PROCEDURE — 2580000003 HC RX 258: Performed by: EMERGENCY MEDICINE

## 2020-07-16 PROCEDURE — 85025 COMPLETE CBC W/AUTO DIFF WBC: CPT

## 2020-07-16 PROCEDURE — 84484 ASSAY OF TROPONIN QUANT: CPT

## 2020-07-16 PROCEDURE — 80048 BASIC METABOLIC PNL TOTAL CA: CPT

## 2020-07-16 PROCEDURE — 96375 TX/PRO/DX INJ NEW DRUG ADDON: CPT

## 2020-07-16 PROCEDURE — 83880 ASSAY OF NATRIURETIC PEPTIDE: CPT

## 2020-07-16 PROCEDURE — 93005 ELECTROCARDIOGRAM TRACING: CPT | Performed by: PHYSICIAN ASSISTANT

## 2020-07-16 PROCEDURE — 83690 ASSAY OF LIPASE: CPT

## 2020-07-16 PROCEDURE — 85014 HEMATOCRIT: CPT

## 2020-07-16 PROCEDURE — 99285 EMERGENCY DEPT VISIT HI MDM: CPT

## 2020-07-16 PROCEDURE — 71046 X-RAY EXAM CHEST 2 VIEWS: CPT

## 2020-07-16 PROCEDURE — 6360000002 HC RX W HCPCS: Performed by: EMERGENCY MEDICINE

## 2020-07-16 PROCEDURE — 96374 THER/PROPH/DIAG INJ IV PUSH: CPT

## 2020-07-16 PROCEDURE — 1200000000 HC SEMI PRIVATE

## 2020-07-16 PROCEDURE — 85018 HEMOGLOBIN: CPT

## 2020-07-16 PROCEDURE — 80053 COMPREHEN METABOLIC PANEL: CPT

## 2020-07-16 RX ORDER — SODIUM CHLORIDE 9 MG/ML
INJECTION, SOLUTION INTRAVENOUS CONTINUOUS
Status: DISCONTINUED | OUTPATIENT
Start: 2020-07-16 | End: 2020-07-20

## 2020-07-16 RX ORDER — ONDANSETRON 2 MG/ML
4 INJECTION INTRAMUSCULAR; INTRAVENOUS ONCE
Status: COMPLETED | OUTPATIENT
Start: 2020-07-16 | End: 2020-07-16

## 2020-07-16 RX ADMIN — TRIMETHOBENZAMIDE HYDROCHLORIDE 200 MG: 100 INJECTION INTRAMUSCULAR at 21:28

## 2020-07-16 RX ADMIN — ONDANSETRON HYDROCHLORIDE 4 MG: 2 SOLUTION INTRAMUSCULAR; INTRAVENOUS at 21:28

## 2020-07-16 RX ADMIN — SODIUM CHLORIDE: 9 INJECTION, SOLUTION INTRAVENOUS at 20:45

## 2020-07-16 ASSESSMENT — PAIN DESCRIPTION - LOCATION: LOCATION: GENERALIZED

## 2020-07-16 ASSESSMENT — PAIN DESCRIPTION - DESCRIPTORS: DESCRIPTORS: ACHING

## 2020-07-16 ASSESSMENT — PAIN SCALES - GENERAL: PAINLEVEL_OUTOF10: 9

## 2020-07-16 NOTE — CARE COORDINATION
Initiated call for COVID-19 Monitoring. Explained role/reason for call, spoke with the patient briefly, confirmed patient taking prescribed antibiotic, and patient ended the call.

## 2020-07-16 NOTE — ED NOTES
FIRST PROVIDER CONTACT ASSESSMENT NOTE      Department of Emergency Medicine   ED  First Provider Note   7/16/20  7:37 PM EDT    Chief Complaint: No chief complaint on file. History of Present Illness:    Sandhya Kay is a 31 y.o. female who presents to the ED by private car for  sob  Vomiting   Focused Screening Exam:  Constitutional:  Alert, appears stated age and is in no distress.   Heart rrr   Lungs diminished     *ALLERGIES*     Toradol [ketorolac tromethamine]     ED Triage Vitals [07/16/20 1916]   BP Temp Temp src Pulse Resp SpO2 Height Weight   -- -- -- 109 -- 98 % -- --        Initial Plan of Care:  Initiate Treatment-Testing, Proceed toTreatment Area When Bed Available for ED Attending/MLP to Continue Care    -----------------[address] ASSESSMENT NOTE--------------  Electronically signed by MADDIE Long   DD: 7/16/20     MADDIE Long  07/16/20 1958

## 2020-07-17 ENCOUNTER — APPOINTMENT (OUTPATIENT)
Dept: ULTRASOUND IMAGING | Age: 30
DRG: 048 | End: 2020-07-17
Payer: COMMERCIAL

## 2020-07-17 PROBLEM — R11.2 INTRACTABLE NAUSEA AND VOMITING: Status: ACTIVE | Noted: 2020-07-17

## 2020-07-17 LAB
AMPHETAMINE SCREEN, URINE: NOT DETECTED
ANION GAP SERPL CALCULATED.3IONS-SCNC: 15 MMOL/L (ref 7–16)
ANION GAP SERPL CALCULATED.3IONS-SCNC: 9 MMOL/L (ref 7–16)
BACTERIA: ABNORMAL /HPF
BARBITURATE SCREEN URINE: NOT DETECTED
BENZODIAZEPINE SCREEN, URINE: NOT DETECTED
BILIRUBIN URINE: NEGATIVE
BLOOD, URINE: ABNORMAL
BUN BLDV-MCNC: 25 MG/DL (ref 6–20)
BUN BLDV-MCNC: 26 MG/DL (ref 6–20)
CALCIUM IONIZED: 1.23 MMOL/L (ref 1.15–1.33)
CALCIUM SERPL-MCNC: 7.8 MG/DL (ref 8.6–10.2)
CALCIUM SERPL-MCNC: 8.3 MG/DL (ref 8.6–10.2)
CANNABINOID SCREEN URINE: NOT DETECTED
CHLORIDE BLD-SCNC: 102 MMOL/L (ref 98–107)
CHLORIDE BLD-SCNC: 96 MMOL/L (ref 98–107)
CLARITY: CLEAR
CO2: 22 MMOL/L (ref 22–29)
CO2: 25 MMOL/L (ref 22–29)
COCAINE METABOLITE SCREEN URINE: NOT DETECTED
COLOR: YELLOW
CREAT SERPL-MCNC: 5 MG/DL (ref 0.5–1)
CREAT SERPL-MCNC: 5.2 MG/DL (ref 0.5–1)
EKG ATRIAL RATE: 101 BPM
EKG P AXIS: 67 DEGREES
EKG P-R INTERVAL: 124 MS
EKG Q-T INTERVAL: 364 MS
EKG QRS DURATION: 86 MS
EKG QTC CALCULATION (BAZETT): 471 MS
EKG R AXIS: 55 DEGREES
EKG T AXIS: 58 DEGREES
EKG VENTRICULAR RATE: 101 BPM
FENTANYL SCREEN, URINE: NOT DETECTED
GFR AFRICAN AMERICAN: 12
GFR AFRICAN AMERICAN: 12
GFR NON-AFRICAN AMERICAN: 12 ML/MIN/1.73
GFR NON-AFRICAN AMERICAN: 12 ML/MIN/1.73
GLUCOSE BLD-MCNC: 246 MG/DL (ref 74–99)
GLUCOSE BLD-MCNC: 666 MG/DL (ref 74–99)
GLUCOSE URINE: >=1000 MG/DL
HCG(URINE) PREGNANCY TEST: NEGATIVE
KETONES, URINE: ABNORMAL MG/DL
LEUKOCYTE ESTERASE, URINE: ABNORMAL
Lab: ABNORMAL
METER GLUCOSE: 252 MG/DL (ref 74–99)
METER GLUCOSE: 466 MG/DL (ref 74–99)
METER GLUCOSE: 94 MG/DL (ref 74–99)
METER GLUCOSE: >500 MG/DL (ref 74–99)
METHADONE SCREEN, URINE: NOT DETECTED
NITRITE, URINE: NEGATIVE
OPIATE SCREEN URINE: NOT DETECTED
OXYCODONE URINE: POSITIVE
PH UA: 6 (ref 5–9)
PHENCYCLIDINE SCREEN URINE: NOT DETECTED
POTASSIUM SERPL-SCNC: 4 MMOL/L (ref 3.5–5)
POTASSIUM SERPL-SCNC: 4.4 MMOL/L (ref 3.5–5)
PROTEIN UA: >=300 MG/DL
RBC UA: ABNORMAL /HPF (ref 0–2)
REASON FOR REJECTION: NORMAL
REJECTED TEST: NORMAL
SODIUM BLD-SCNC: 133 MMOL/L (ref 132–146)
SODIUM BLD-SCNC: 136 MMOL/L (ref 132–146)
SPECIFIC GRAVITY UA: 1.02 (ref 1–1.03)
TRICHOMONAS: PRESENT /HPF
URINE CULTURE, ROUTINE: NORMAL
UROBILINOGEN, URINE: 0.2 E.U./DL
WBC UA: ABNORMAL /HPF (ref 0–5)

## 2020-07-17 PROCEDURE — C9113 INJ PANTOPRAZOLE SODIUM, VIA: HCPCS | Performed by: INTERNAL MEDICINE

## 2020-07-17 PROCEDURE — 6370000000 HC RX 637 (ALT 250 FOR IP): Performed by: INTERNAL MEDICINE

## 2020-07-17 PROCEDURE — 6360000002 HC RX W HCPCS: Performed by: INTERNAL MEDICINE

## 2020-07-17 PROCEDURE — 81001 URINALYSIS AUTO W/SCOPE: CPT

## 2020-07-17 PROCEDURE — 2580000003 HC RX 258: Performed by: INTERNAL MEDICINE

## 2020-07-17 PROCEDURE — 76770 US EXAM ABDO BACK WALL COMP: CPT

## 2020-07-17 PROCEDURE — 93010 ELECTROCARDIOGRAM REPORT: CPT | Performed by: INTERNAL MEDICINE

## 2020-07-17 PROCEDURE — 81025 URINE PREGNANCY TEST: CPT

## 2020-07-17 PROCEDURE — 80048 BASIC METABOLIC PNL TOTAL CA: CPT

## 2020-07-17 PROCEDURE — 3E1M39Z IRRIGATION OF PERITONEAL CAVITY USING DIALYSATE, PERCUTANEOUS APPROACH: ICD-10-PCS | Performed by: INTERNAL MEDICINE

## 2020-07-17 PROCEDURE — 90945 DIALYSIS ONE EVALUATION: CPT | Performed by: INTERNAL MEDICINE

## 2020-07-17 PROCEDURE — 87077 CULTURE AEROBIC IDENTIFY: CPT

## 2020-07-17 PROCEDURE — 2060000000 HC ICU INTERMEDIATE R&B

## 2020-07-17 PROCEDURE — 82330 ASSAY OF CALCIUM: CPT

## 2020-07-17 PROCEDURE — 80307 DRUG TEST PRSMV CHEM ANLYZR: CPT

## 2020-07-17 PROCEDURE — 82962 GLUCOSE BLOOD TEST: CPT

## 2020-07-17 PROCEDURE — 87186 SC STD MICRODIL/AGAR DIL: CPT

## 2020-07-17 PROCEDURE — 2580000003 HC RX 258: Performed by: NURSE PRACTITIONER

## 2020-07-17 PROCEDURE — 99231 SBSQ HOSP IP/OBS SF/LOW 25: CPT | Performed by: INTERNAL MEDICINE

## 2020-07-17 PROCEDURE — 36415 COLL VENOUS BLD VENIPUNCTURE: CPT

## 2020-07-17 PROCEDURE — 87088 URINE BACTERIA CULTURE: CPT

## 2020-07-17 RX ORDER — SODIUM CHLORIDE 9 MG/ML
10 INJECTION INTRAVENOUS DAILY
Status: DISCONTINUED | OUTPATIENT
Start: 2020-07-17 | End: 2020-07-20 | Stop reason: CLARIF

## 2020-07-17 RX ORDER — HEPARIN SODIUM 10000 [USP'U]/ML
5000 INJECTION, SOLUTION INTRAVENOUS; SUBCUTANEOUS EVERY 8 HOURS
Status: DISCONTINUED | OUTPATIENT
Start: 2020-07-17 | End: 2020-07-21 | Stop reason: HOSPADM

## 2020-07-17 RX ORDER — ATORVASTATIN CALCIUM 40 MG/1
40 TABLET, FILM COATED ORAL NIGHTLY
Status: DISCONTINUED | OUTPATIENT
Start: 2020-07-17 | End: 2020-07-21 | Stop reason: HOSPADM

## 2020-07-17 RX ORDER — POLYETHYLENE GLYCOL 3350 17 G/17G
17 POWDER, FOR SOLUTION ORAL DAILY PRN
Status: DISCONTINUED | OUTPATIENT
Start: 2020-07-17 | End: 2020-07-21 | Stop reason: HOSPADM

## 2020-07-17 RX ORDER — INSULIN GLARGINE 100 [IU]/ML
10 INJECTION, SOLUTION SUBCUTANEOUS 2 TIMES DAILY
Status: DISCONTINUED | OUTPATIENT
Start: 2020-07-17 | End: 2020-07-21 | Stop reason: HOSPADM

## 2020-07-17 RX ORDER — METOCLOPRAMIDE HYDROCHLORIDE 5 MG/ML
10 INJECTION INTRAMUSCULAR; INTRAVENOUS EVERY 6 HOURS SCHEDULED
Status: DISPENSED | OUTPATIENT
Start: 2020-07-17 | End: 2020-07-18

## 2020-07-17 RX ORDER — METOPROLOL TARTRATE 50 MG/1
100 TABLET, FILM COATED ORAL 2 TIMES DAILY
Status: DISCONTINUED | OUTPATIENT
Start: 2020-07-17 | End: 2020-07-21 | Stop reason: HOSPADM

## 2020-07-17 RX ORDER — ONDANSETRON 4 MG/1
4 TABLET, ORALLY DISINTEGRATING ORAL EVERY 8 HOURS PRN
Status: DISCONTINUED | OUTPATIENT
Start: 2020-07-17 | End: 2020-07-21 | Stop reason: HOSPADM

## 2020-07-17 RX ORDER — SODIUM CHLORIDE 0.9 % (FLUSH) 0.9 %
10 SYRINGE (ML) INJECTION EVERY 12 HOURS SCHEDULED
Status: DISCONTINUED | OUTPATIENT
Start: 2020-07-17 | End: 2020-07-21 | Stop reason: HOSPADM

## 2020-07-17 RX ORDER — PANTOPRAZOLE SODIUM 40 MG/10ML
40 INJECTION, POWDER, LYOPHILIZED, FOR SOLUTION INTRAVENOUS DAILY
Status: DISCONTINUED | OUTPATIENT
Start: 2020-07-17 | End: 2020-07-20 | Stop reason: CLARIF

## 2020-07-17 RX ORDER — DEXTROSE MONOHYDRATE 25 G/50ML
12.5 INJECTION, SOLUTION INTRAVENOUS PRN
Status: CANCELLED | OUTPATIENT
Start: 2020-07-17

## 2020-07-17 RX ORDER — BUMETANIDE 1 MG/1
1 TABLET ORAL DAILY
Status: DISCONTINUED | OUTPATIENT
Start: 2020-07-17 | End: 2020-07-19

## 2020-07-17 RX ORDER — SODIUM CHLORIDE 0.9 % (FLUSH) 0.9 %
10 SYRINGE (ML) INJECTION PRN
Status: DISCONTINUED | OUTPATIENT
Start: 2020-07-17 | End: 2020-07-21 | Stop reason: HOSPADM

## 2020-07-17 RX ORDER — METRONIDAZOLE 500 MG/1
2000 TABLET ORAL ONCE
Status: COMPLETED | OUTPATIENT
Start: 2020-07-17 | End: 2020-07-17

## 2020-07-17 RX ORDER — ONDANSETRON 2 MG/ML
4 INJECTION INTRAMUSCULAR; INTRAVENOUS EVERY 6 HOURS PRN
Status: DISCONTINUED | OUTPATIENT
Start: 2020-07-17 | End: 2020-07-21 | Stop reason: HOSPADM

## 2020-07-17 RX ORDER — DEXTROSE MONOHYDRATE 25 G/50ML
12.5 INJECTION, SOLUTION INTRAVENOUS PRN
Status: DISCONTINUED | OUTPATIENT
Start: 2020-07-17 | End: 2020-07-21 | Stop reason: HOSPADM

## 2020-07-17 RX ORDER — DILTIAZEM HYDROCHLORIDE 240 MG/1
240 CAPSULE, COATED, EXTENDED RELEASE ORAL DAILY
Status: DISCONTINUED | OUTPATIENT
Start: 2020-07-17 | End: 2020-07-21 | Stop reason: HOSPADM

## 2020-07-17 RX ORDER — ACETAMINOPHEN 650 MG/1
650 SUPPOSITORY RECTAL EVERY 6 HOURS PRN
Status: DISCONTINUED | OUTPATIENT
Start: 2020-07-17 | End: 2020-07-21 | Stop reason: HOSPADM

## 2020-07-17 RX ORDER — NICOTINE 21 MG/24HR
1 PATCH, TRANSDERMAL 24 HOURS TRANSDERMAL DAILY
Status: DISCONTINUED | OUTPATIENT
Start: 2020-07-17 | End: 2020-07-21 | Stop reason: HOSPADM

## 2020-07-17 RX ORDER — NICOTINE POLACRILEX 4 MG
15 LOZENGE BUCCAL PRN
Status: DISCONTINUED | OUTPATIENT
Start: 2020-07-17 | End: 2020-07-21 | Stop reason: HOSPADM

## 2020-07-17 RX ORDER — NICOTINE POLACRILEX 4 MG
15 LOZENGE BUCCAL PRN
Status: CANCELLED | OUTPATIENT
Start: 2020-07-17

## 2020-07-17 RX ORDER — DEXTROSE MONOHYDRATE 50 MG/ML
100 INJECTION, SOLUTION INTRAVENOUS PRN
Status: DISCONTINUED | OUTPATIENT
Start: 2020-07-17 | End: 2020-07-21 | Stop reason: HOSPADM

## 2020-07-17 RX ORDER — CIPROFLOXACIN 500 MG/1
500 TABLET, FILM COATED ORAL 2 TIMES DAILY
Status: CANCELLED | OUTPATIENT
Start: 2020-07-17

## 2020-07-17 RX ORDER — DEXTROSE MONOHYDRATE 50 MG/ML
100 INJECTION, SOLUTION INTRAVENOUS PRN
Status: CANCELLED | OUTPATIENT
Start: 2020-07-17

## 2020-07-17 RX ORDER — ACETAMINOPHEN 325 MG/1
650 TABLET ORAL EVERY 6 HOURS PRN
Status: DISCONTINUED | OUTPATIENT
Start: 2020-07-17 | End: 2020-07-21 | Stop reason: HOSPADM

## 2020-07-17 RX ADMIN — INSULIN GLARGINE 10 UNITS: 100 INJECTION, SOLUTION SUBCUTANEOUS at 09:14

## 2020-07-17 RX ADMIN — ONDANSETRON HYDROCHLORIDE 4 MG: 2 SOLUTION INTRAMUSCULAR; INTRAVENOUS at 16:01

## 2020-07-17 RX ADMIN — INSULIN LISPRO 6 UNITS: 100 INJECTION, SOLUTION INTRAVENOUS; SUBCUTANEOUS at 12:04

## 2020-07-17 RX ADMIN — INSULIN LISPRO 6 UNITS: 100 INJECTION, SOLUTION INTRAVENOUS; SUBCUTANEOUS at 09:15

## 2020-07-17 RX ADMIN — METOCLOPRAMIDE HYDROCHLORIDE 10 MG: 5 INJECTION INTRAMUSCULAR; INTRAVENOUS at 23:39

## 2020-07-17 RX ADMIN — ONDANSETRON HYDROCHLORIDE 4 MG: 2 SOLUTION INTRAMUSCULAR; INTRAVENOUS at 07:42

## 2020-07-17 RX ADMIN — SODIUM CHLORIDE, PRESERVATIVE FREE 10 ML: 5 INJECTION INTRAVENOUS at 12:05

## 2020-07-17 RX ADMIN — CEFTRIAXONE SODIUM 1 G: 1 INJECTION, POWDER, FOR SOLUTION INTRAMUSCULAR; INTRAVENOUS at 04:13

## 2020-07-17 RX ADMIN — ATORVASTATIN CALCIUM 40 MG: 40 TABLET, FILM COATED ORAL at 23:40

## 2020-07-17 RX ADMIN — SODIUM CHLORIDE, PRESERVATIVE FREE 10 ML: 5 INJECTION INTRAVENOUS at 21:57

## 2020-07-17 RX ADMIN — SODIUM CHLORIDE: 9 INJECTION, SOLUTION INTRAVENOUS at 07:42

## 2020-07-17 RX ADMIN — PANTOPRAZOLE SODIUM 40 MG: 40 INJECTION, POWDER, FOR SOLUTION INTRAVENOUS at 12:05

## 2020-07-17 RX ADMIN — BUMETANIDE 1 MG: 1 TABLET ORAL at 12:05

## 2020-07-17 RX ADMIN — INSULIN GLARGINE 10 UNITS: 100 INJECTION, SOLUTION SUBCUTANEOUS at 21:57

## 2020-07-17 RX ADMIN — METOCLOPRAMIDE HYDROCHLORIDE 10 MG: 5 INJECTION INTRAMUSCULAR; INTRAVENOUS at 12:05

## 2020-07-17 RX ADMIN — METOPROLOL TARTRATE 100 MG: 50 TABLET, FILM COATED ORAL at 21:56

## 2020-07-17 RX ADMIN — DILTIAZEM HYDROCHLORIDE 240 MG: 240 CAPSULE, EXTENDED RELEASE ORAL at 16:01

## 2020-07-17 RX ADMIN — INSULIN LISPRO 3 UNITS: 100 INJECTION, SOLUTION INTRAVENOUS; SUBCUTANEOUS at 17:09

## 2020-07-17 RX ADMIN — METRONIDAZOLE 2000 MG: 500 TABLET ORAL at 23:39

## 2020-07-17 RX ADMIN — METOCLOPRAMIDE HYDROCHLORIDE 10 MG: 5 INJECTION INTRAMUSCULAR; INTRAVENOUS at 04:21

## 2020-07-17 RX ADMIN — INSULIN LISPRO 6 UNITS: 100 INJECTION, SOLUTION INTRAVENOUS; SUBCUTANEOUS at 08:45

## 2020-07-17 RX ADMIN — METOPROLOL TARTRATE 100 MG: 50 TABLET, FILM COATED ORAL at 12:05

## 2020-07-17 RX ADMIN — SODIUM CHLORIDE: 9 INJECTION, SOLUTION INTRAVENOUS at 16:01

## 2020-07-17 ASSESSMENT — ENCOUNTER SYMPTOMS
NAUSEA: 1
DIARRHEA: 1
SHORTNESS OF BREATH: 1
VOMITING: 1
BLOOD IN STOOL: 0
RHINORRHEA: 0
COUGH: 0
ABDOMINAL PAIN: 1
SORE THROAT: 0
BACK PAIN: 1

## 2020-07-17 ASSESSMENT — PAIN SCALES - GENERAL
PAINLEVEL_OUTOF10: 0
PAINLEVEL_OUTOF10: 0

## 2020-07-17 NOTE — ED PROVIDER NOTES
Department of Emergency Medicine   ED  Provider Note  Admit Date/RoomTime: 7/16/2020  8:12 PM  ED Room: 05/05          History of Present Illness:  7/16/20, Time: 9:16 PM EDT  Chief Complaint   Patient presents with    Emesis     Pt states lack of appetite and emesis x2 weeks. Pt states she feels weak and short of breath upon standing. Pt just started perotoneal dialysis approx 1.5 weeks ago. Judge Upton is a 31 y.o. female presenting to the ED for nausea and vomiting. Patient's been having nausea, vomiting, and unable to tolerate p.o. for the past week. Denies any associated pain. Does have a history of gastroparesis, she says she cannot tolerate anything. She was evaluated at [address] [place] [address] 2 days ago, still has a UTI, was discharged, she still cannot tolerate p.o. She also mentions she was started on peritoneal dialysis about a week ago, she is concerned the dialysis is making her nauseous. She said her blood sugars has been erratic, as low as 60 up to 500. She spoke with her nephrologist and instructed to present here. She denies any fever, chills, chest pain or back pain, dominant, change in bowel bladder, neck pain or stiffness, or any other symptoms.     Review of Systems:   Pertinent positives and negatives are stated within HPI, all other systems reviewed and are negative.        --------------------------------------------- PAST HISTORY ---------------------------------------------  Past Medical History:  has a past medical history of Acute congestive heart failure ([facility]) [facility] Cephalgia, Chronic kidney disease, Depression, Diabetes mellitus ([facility] Diabetic ketoacidosis ([facility] Hemodialysis patient ([facility] History of blood transfusion, Iron deficiency anemia, MDRO (multiple drug resistant organisms) resistance, MRSA (methicillin resistant Staphylococcus aureus), Non compliance w medication regimen, Other disorders of kidney and ureter, Pregnancy, and Severe pre-eclampsia in third trimester. Past Surgical History:  has a past surgical history that includes ECHO Compl W Dop Color Flow (2012); ECHO Compl W Dop Color Flow (6/10/2013);  section; Tunneled venous port placement (2018); pr esophagogastroduodenoscopy transoral diagnostic (N/A, 2018); back surgery; Colonoscopy (N/A, 2018); Colonoscopy (N/A, 2018); Upper gastrointestinal endoscopy (2018); Upper gastrointestinal endoscopy (N/A, 10/11/2019); Cholecystectomy, laparoscopic (N/A, 2019); and LAPAROSCOPY INSERTION PERITONEAL CATHETER (N/A, 2020). Social History:  reports that she has been smoking cigarettes. She has a 3.00 pack-year smoking history. She uses smokeless tobacco. She reports that she does not drink alcohol or use drugs. Family History: family history includes Asthma in her brother and mother; Diabetes in her mother; High Blood Pressure in her father and mother; Hypertension in her mother. . Unless otherwise noted, family history is non contributory    The patients home medications have been reviewed. Allergies: Toradol [ketorolac tromethamine]        ---------------------------------------------------PHYSICAL EXAM--------------------------------------    Constitutional/General: Alert and oriented x3  Head: Normocephalic and atraumatic  Eyes: PERRL, EOMI, sclera non icteric  Mouth: Oropharynx clear, handling secretions, no trismus, no asymmetry of the posterior oropharynx or uvular edema  Neck: Supple, full ROM, no stridor, no meningeal signs  Respiratory: Lungs clear to auscultation bilaterally, no wheezes, rales, or rhonchi. Not in respiratory distress  Cardiovascular:  Regular rate. Regular rhythm. 2+ distal pulses. Equal extremity pulses. Chest: No chest wall tenderness  GI:  Abdomen Soft, Non tender, Non distended. No rebound, guarding, or rigidity. No pulsatile masses. Musculoskeletal: Moves all extremities x 4.  Warm and well perfused, no clubbing, cyanosis, or edema. Capillary refill <3 seconds  Integument: skin warm and dry. No rashes. Neurologic: GCS 15, no focal deficits, symmetric strength 5/5 in the upper and lower extremities bilaterally  Psychiatric: Normal Affect          -------------------------------------------------- RESULTS -------------------------------------------------  I have personally reviewed all laboratory and imaging results for this patient. Results are listed below.      LABS: (Lab results interpreted by me)  Results for orders placed or performed during the hospital encounter of 07/16/20   pH, venous   Result Value Ref Range    pH, Khurram 7.37 7.35 - 7.45   Beta-Hydroxybutyrate   Result Value Ref Range    Beta-Hydroxybutyrate 0.70 (H) 0.02 - 0.27 mmol/L   Brain Natriuretic Peptide   Result Value Ref Range    Pro-BNP 4,696 (H) 0 - 125 pg/mL   Lactic Acid, Plasma   Result Value Ref Range    Lactic Acid 1.2 0.5 - 2.2 mmol/L   Lipase   Result Value Ref Range    Lipase 6 (L) 13 - 60 U/L   Comprehensive Metabolic Panel w/ Reflex to MG   Result Value Ref Range    Sodium 140 132 - 146 mmol/L    Potassium reflex Magnesium 4.1 3.5 - 5.0 mmol/L    Chloride 102 98 - 107 mmol/L    CO2 27 22 - 29 mmol/L    Anion Gap 11 7 - 16 mmol/L    Glucose 187 (H) 74 - 99 mg/dL    BUN 21 (H) 6 - 20 mg/dL    CREATININE 5.3 (H) 0.5 - 1.0 mg/dL    GFR Non-African American 12 >=60 mL/min/1.73    GFR African American 12     Calcium 8.9 8.6 - 10.2 mg/dL    Total Protein 5.7 (L) 6.4 - 8.3 g/dL    Alb 2.9 (L) 3.5 - 5.2 g/dL    Total Bilirubin <0.2 0.0 - 1.2 mg/dL    Alkaline Phosphatase 162 (H) 35 - 104 U/L    ALT 9 0 - 32 U/L    AST 11 0 - 31 U/L   CBC Auto Differential   Result Value Ref Range    WBC 11.2 4.5 - 11.5 E9/L    RBC 3.58 3.50 - 5.50 E12/L    Hemoglobin 11.1 (L) 11.5 - 15.5 g/dL    Hematocrit 34.9 34.0 - 48.0 %    MCV 97.5 80.0 - 99.9 fL    MCH 31.0 26.0 - 35.0 pg    MCHC 31.8 (L) 32.0 - 34.5 %    RDW 14.2 11.5 - 15.0 fL    Platelets 265 130 - 450 E9/L    MPV 10.3 7.0 - 12.0 fL    Neutrophils % 76.2 43.0 - 80.0 %    Immature Granulocytes % 0.3 0.0 - 5.0 %    Lymphocytes % 15.1 (L) 20.0 - 42.0 %    Monocytes % 5.8 2.0 - 12.0 %    Eosinophils % 1.4 0.0 - 6.0 %    Basophils % 1.2 0.0 - 2.0 %    Neutrophils Absolute 8.55 (H) 1.80 - 7.30 E9/L    Immature Granulocytes # 0.03 E9/L    Lymphocytes Absolute 1.69 1.50 - 4.00 E9/L    Monocytes Absolute 0.65 0.10 - 0.95 E9/L    Eosinophils Absolute 0.16 0.05 - 0.50 E9/L    Basophils Absolute 0.14 0.00 - 0.20 E9/L   Troponin   Result Value Ref Range    Troponin 0.06 (H) 0.00 - 0.03 ng/mL   POCT Glucose   Result Value Ref Range    Meter Glucose 161 (H) 74 - 99 mg/dL   EKG 12 Lead   Result Value Ref Range    Ventricular Rate 101 BPM    Atrial Rate 101 BPM    P-R Interval 124 ms    QRS Duration 86 ms    Q-T Interval 364 ms    QTc Calculation (Bazett) 471 ms    P Axis 67 degrees    R Axis 55 degrees    T Axis 58 degrees   ,       RADIOLOGY:  Interpreted by Radiologist unless otherwise specified  XR CHEST STANDARD (2 VW)   Final Result   No acute infiltrate                           EKG Interpretation  Interpreted by emergency department physician, Dr. Miguel Marrufo     Sinus, rate 101, no STEMI        ------------------------- NURSING NOTES AND VITALS REVIEWED ---------------------------   The nursing notes within the ED encounter and vital signs as below have been reviewed by myself  /76   Pulse 110   Temp 97.6 °F (36.4 °C)   Resp 12   LMP 07/01/2020 (Approximate)   SpO2 97%     Oxygen Saturation Interpretation: Normal    The patients available past medical records and past encounters were reviewed.         ------------------------------ ED COURSE/MEDICAL DECISION MAKING----------------------  Medications   0.9 % sodium chloride infusion ( Intravenous New Bag 7/16/20 2045)   ondansetron (ZOFRAN) injection 4 mg (4 mg Intravenous Given 7/16/20 2128)   trimethobenzamide (TIGAN) injection 200 mg (200 mg Intramuscular Given 7/16/20 2128)           The cardiac monitor revealed sinus with a heart rate in the 90s as interpreted by me. The cardiac monitor was ordered secondary to the patient's nausea and to monitor the patient for dysrhythmia. CPT [id number]         Medical Decision Making:    Patient medicated as above. Labs and imaging reviewed. Reevaluation, patient continues to complain of nausea. Discussed with Dr. Kathryn Coleman, recommends admission given her fluctuating blood sugars along with her recent onset of her peritoneal dialysis. Discussed with medicine, patient was admitted. Counseling: The emergency provider has spoken with the patient and discussed todays results, in addition to providing specific details for the plan of care and counseling regarding the diagnosis and prognosis. Questions are answered at this time and they are agreeable with the plan.       --------------------------------- IMPRESSION AND DISPOSITION ---------------------------------    IMPRESSION  1. Nausea and vomiting, intractability of vomiting not specified, unspecified vomiting type    2. ESRD (end stage renal disease) ([facility]        DISPOSITION  Disposition: Admit to telemetry  Patient condition is stable        NOTE: This report was transcribed using voice recognition software.  Every effort was made to ensure accuracy; however, inadvertent computerized transcription errors may be present        Nlel Guzman MD  07/16/20 6548

## 2020-07-17 NOTE — FLOWSHEET NOTE
07/17/20 1810   Vitals   Temp 97.4 °F (36.3 °C)   Temp Source Axillary   Pulse 79   Resp 19   Weight 125 lb 7.1 oz (56.9 kg)   Peritoneal Dialysis Catheter Tenckhoff    Placement Date: 06/26/20   Catheter Type: Tenckhoff  Catheter Location: (c)    Status Accessed   Site Condition No Complications   Dressing Status Clean;Dry; Intact   Dressing Occlusive   Cycler   Verification of Prescription CCPD   Total Volume Programmed 30915 mL   Therapy Time (Hours:Minutes) 9hrs   Fill Volume 2000 mL   Last Fill Volume 2000 mL   Dextrose Setting Same (Nonextraneal)   Number of Cycles 5   Bag Volume 5000 mL   Number of Bags Used 3   Dianeal Solution Other (Comment)   CCPD initiated using strict aspetic technique, old dressing removed, site cleansed, new DSD applied to site, pt tolerated procedure well.

## 2020-07-17 NOTE — CARE COORDINATION
SOCIAL WORK/DISCHARGE PLANNING;  Spoke with Wilton in room. She is alert and oriented, pleasant. Pt reported that she lives with her two children(both 3y.o) and her mother. Pt reports she does not have a pcp and would be receptive to having pcp appointment arranged prior to discharge. Nursing advised. Pt reports being independent at home. She does not have any services at home. Her Mother assist. Pt does [facility]onel Dialysis at home and  states she goes through  [facility] for [facility] supply. Pt states plan is to return home at discharge and her mom or her children's father will transport her home. Pt currently denying anyd care coordination needs. Per pt, her pharmacy is [facility] on [address] [facility] in [place].     Carolyne Nice LS  [phone]

## 2020-07-17 NOTE — CONSULTS
Department of Internal Medicine  Nephrology Attending Consult Note      Reason for Consult:  ESRD on PD  Requesting Physician:  Dr. Lorna Tellez:  Abdominal pain, nausea and vomiting    History Obtained From:  patient, electronic medical record    HISTORY OF PRESENT ILLNESS:      Briefly, Mela Holland is a 31year-old female with history of ESRD on RRT (transitioned from intermittent hemodialysis to PD 1 week ago), with severe proteinuria, poorly controlled type I DM with multiple admissions for DKA, gastroparesis, HTN and recently diagnosed with UTI on 7/14 and treated with cipro, who presented to the ER on 7/16/2020 with complaints of nausea and vomiting x 2 weeks. She reported that she has been unable to keep anything down for the last few days and that her blood glucose had been anywhere from  mg/dL. She complains as well of abdominal pain to bilateral lower quadrants which radiates to her right flank. Labs obtained in ER were significant for BHB 0.70, glucose 666, BUN 21, Cr 5.3 and BNP 4,696. A CXR showed no acute findings. UA showed >1000 glucose with trace ketones, moderate blood, small leukocytes and microscopy showed 5-10 WBC. She was admitted for futher treatment and evaluation. We are consulted for management of dialysis. She is s/p PD catheter placement on 6/26/2020. PD was one week ago and she denies any issues with it at home. Last PD treatment per patient was the evening of 7/15/2020.       Past Medical History:        Diagnosis Date    Acute congestive heart failure ([address]     BC (acute kidney injury) ([address] 10/1/2019    Cephalgia 10/9/2019    Chronic kidney disease     Depression     Diabetes mellitus ([address]     Diabetic ketoacidosis ([address] 8/27/2011    [facility]     History of blood transfusion 11/2019    Iron deficiency anemia 10/1/2019    MDRO (multiple drug resistant organisms) resistance     MRSA (methicillin resistant Staphylococcus aureus) injection 10 mL, 10 mL, Intravenous, PRN  acetaminophen (TYLENOL) tablet 650 mg, 650 mg, Oral, Q6H PRN **OR** acetaminophen (TYLENOL) suppository 650 mg, 650 mg, Rectal, Q6H PRN  polyethylene glycol (GLYCOLAX) packet 17 g, 17 g, Oral, Daily PRN  ondansetron (ZOFRAN-ODT) disintegrating tablet 4 mg, 4 mg, Oral, Q8H PRN **OR** ondansetron (ZOFRAN) injection 4 mg, 4 mg, Intravenous, Q6H PRN  metoclopramide (REGLAN) injection 10 mg, 10 mg, Intravenous, 4 times per day  nicotine (NICODERM CQ) 14 MG/24HR 1 patch, 1 patch, Transdermal, Daily  heparin (porcine) injection 5,000 Units, 5,000 Units, Subcutaneous, Q8H  cefTRIAXone (ROCEPHIN) 1 g in sterile water 10 mL IV syringe, 1 g, Intravenous, Q24H  glucose (GLUTOSE) 40 % oral gel 15 g, 15 g, Oral, PRN  dextrose 50 % IV solution, 12.5 g, Intravenous, PRN  glucagon (rDNA) injection 1 mg, 1 mg, Intramuscular, PRN  dextrose 5 % solution, 100 mL/hr, Intravenous, PRN  insulin glargine (LANTUS) injection vial 10 Units, 10 Units, Subcutaneous, BID  insulin lispro (HUMALOG) injection vial 0-6 Units, 0-6 Units, Subcutaneous, Q4H  trimethobenzamide (TIGAN) injection 200 mg, 200 mg, Intramuscular, Q6H PRN  0.9 % sodium chloride infusion, , Intravenous, Continuous  Allergies: Toradol [ketorolac tromethamine]    Social History:    TOBACCO:   reports that she has been smoking cigarettes. She has a 3.00 pack-year smoking history. She uses smokeless tobacco.  ETOH:   reports no history of alcohol use.     Family History:       Problem Relation Age of Onset   [facility] Asthma Mother     Hypertension Mother     High Blood Pressure Mother     Diabetes Mother     Asthma Brother     High Blood Pressure Father      REVIEW OF SYSTEMS:    CONSTITUTIONAL:  negative for  fevers and chills  EYES:  negative for  double vision and blurred vision  HEENT:  negative for  hearing loss and epistaxis  RESPIRATORY:  negative for  dry cough, dyspnea and wheezing  CARDIOVASCULAR:  negative for  chest pain, 12 07/17/2020    GLUCOSE 666 07/17/2020    GLUCOSE 130 05/18/2012    PROT 5.7 07/16/2020    LABALBU 2.9 07/16/2020    LABALBU 4.1 05/18/2012    CALCIUM 8.3 07/17/2020    BILITOT <0.2 07/16/2020    ALKPHOS 162 07/16/2020    AST 11 07/16/2020    ALT 9 07/16/2020     Magnesium:    Lab Results   Component Value Date    MG 2.1 07/14/2020     Phosphorus:    Lab Results   Component Value Date    PHOS 2.2 07/14/2020     Radiology Review:      CXR 7/16/2020   No acute infiltrate             IMPRESSION/RECOMMENDATIONS:      Briefly, Siobhan Gama is a 31year-old female with history of ESRD on RRT (transitioned from intermittent hemodialysis to PD 1 week ago), with severe proteinuria, poorly controlled type I DM with multiple admissions for DKA, gastroparesis, HTN and recently diagnosed with UTI on 7/14 and treated with cipro, who presented to the ER on 7/16/2020 with complaints of nausea and vomiting x 2 weeks. She reported that she has been unable to keep anything down for the last few days and that her blood glucose had been anywhere from  mg/dL. She complains as well of abdominal pain to bilateral lower quadrants which radiates to her right flank. Labs obtained in ER were significant for BHB 0.70, glucose 666, BUN 21, Cr 5.3 and BNP 4,696. A CXR showed no acute findings. UA showed >1000 glucose with trace ketones, moderate blood, small leukocytes and microscopy showed 5-10 WBC. She was admitted for futher treatment and evaluation. We are consulted for management of dialysis. She is s/p PD catheter placement on 6/26/2020. PD was one week ago and she denies any issues with it at home. Last PD treatment per patient was the evening of 7/15/2020.      1. ESRD on RRT, with severe proteinuria, nephrotic range, due to diabetic nephropathy,  recently transitioned from IHD to PD one week ago. She reports that her last PD treatment was Wednesday 7/15/2020 evening. She denies any issues with PD at home.  PD catheter site

## 2020-07-17 NOTE — PROGRESS NOTES
Call placed to on call dialysis to inquire about alarms on peritoneal dialysis machine. Awaiting callback at this time.

## 2020-07-17 NOTE — PROGRESS NOTES
[address] [facility]  Internal Medicine Residency / [address]    Attending Physician Statement  I have discussed the case, including pertinent history and exam findings with the resident and the team.  I have seen and examined the patient and the key elements of the encounter have been performed by me. I agree with the assessment, plan and orders as documented by the resident. Longstanding Type 1 with pelvic pain  HAs Diabetic nephropathy on Dialysis  Beta HCG negative  No guarding or rebound  HAs a UTI on Rocephin  CRF with dialysis  Extreme nausea on meds  Hx of PN and autonomic neuropathy  Grade 4 Diabetic neuropathy  Plan: Continue same and follow    Remainder of medical problems as per resident note.       Charlotte Gordon  Internal Medicine Residency Faculty

## 2020-07-17 NOTE — PROGRESS NOTES
Patient unable to name all of her medications at this time. Will call mother at a later time to verify meds.

## 2020-07-17 NOTE — PROGRESS NOTES
guarding noted. Tenderness to palpation in the hypogastrium/suprapubic region bilaterally. · Extremities: Extremities atraumatic, normal skin color, no bruising or lesions noted, except for a healing blue/purple crust on the left lateral ankle/dorsal foot. · Neurologic: Fatigued but alert, oriented, thought content appropriate. Student's Assessment and Plan     Yash Maldonado is a 31 y.o. female with hx of CRF on dialysis, diabetes mellitus type I, HTN, HLD, gastroparesis who presented to the ER yesterday with 2 wk history of abdominal pain, fatigue, nausea/vomiting, and associated loss of appetite. 1. Hypogastric/suprapubic abdominal pain   -bhcg negative for pregnancy.   -Current UTI, cultures recently returned positive for trichomonas. No CVA tenderness on exam. Currently on ceftriaxone 2g daily but will add metronidazole 500mg BID for 7 days.   -Complete US to evaluate for ovarian torsion and nephrolithiasis.   -Pancreatitis, appendicitis less likely given lipase of 6 and WBC count. 2. Nausea and vomiting   -Possibly 2/2 to nephrolithiasis, gastroparesis. -Continue reglan 10g IV q6hrs.   -continue ondansetron injection 4mg q8 as needed. 3. Elevated blood glucose   -Use corrective low dose sliding scale insulin today and calculate daily insulin requirement for regimen tomorrow. 4. HTN, HLD   -continue atorvastatin and metoprolol tartrate per home dose.     PT/OT evaluation:  DVT prophylaxis/ GI prophylaxis:   Disposition: home +/- home health / SNF / [facility] / [address] [facility]  MS3  Attending physician: Dr. Abril Olvera

## 2020-07-17 NOTE — ED NOTES
Bed: 05  Expected date:   Expected time:   Means of arrival:   Comments:  Triage      Lee Wells RN  07/16/20 2012

## 2020-07-17 NOTE — PROGRESS NOTES
Message sent to IM resident concerning patient's elevated blood glucose of 666. Awaiting orders or callback at this time.

## 2020-07-17 NOTE — H&P
[facility]  Internal Medicine Residency Program  History and Physical    Patient:  Ibrahima Abdullahi 31 y.o. female MRN: [id number]     Date of Service: 7/16/2020    Hospital Day: 1      Chief Complaint: Intractable vomiting, abdominal pain, fatigue, SOB    History of Present Illness   The patient is a 31 y.o. female with a PMHx of DM type 1, HTN, CKD stage 5 on PD, gastroparesis, nephrolithiasis who presented with intractable vomiting, fatigue, SOB, and decreased PO intake. Patient states that she began having abdominal pain and intractable nausea and vomiting 2 weeks ago. The pain is intermittent and she reports that she had 2 episodes per day, lasting around 1 hour. It is located in the lower abdomen and wraps around to the right back. She describes it as aching that occasionally becomes severe stabbing. There are no alleviating or exacerbating factors. The nausea and vomiting it constant and she has been unable to have much intake. Reports that she vomits immediately after trying to eat or drink something and the emesis looks what she just had. Denies bilious or bloody appearing emesis. She also c/o lightheadedness and SOB when she stands up. Denies syncope. Has chronic diarrhea that is unchanged, denies hematochezia. Noticed black spots in her right visual field the past few weeks. States that she has been diagnosed with diabetic retinopathy but has not had an eye exam recently. Does not think she is pregnant. She notes that she had her peritoneal dialysis catheter inserted on 6/28. She had been undergoing hemodialysis before then. Also notes that her sugars have been fluctuating and ranged from 60 to 500 earlier. She was seen in the [facility]'s ED on 7/14 and was diagnosed with a UTI and prescribed Cipro. She still c/o difficulty urinating and dysuria. Denies hematuria. ED Course: Patient says she feels improved but still slightly nauseous. Beta-hydroxybutyrate 0.7.  Pro-BNP 8,278. Lactic acid 1.2. Troponin 0.06. Creatinine 5.3, otherwise BMP unremarkable. Alk phos 162. AST/ALT/bili WNL. Lipase 6. Glucose 609->42->187. Hgb 11.1 (baseline 8-9). WBC 11.2. UA: Moderate blood, neg nitrites, neg leuk esterase, >20 WBC, many bacteria. CXR: No acute infiltrate. ED Meds: Patient was given Zofran, Tigan. ED Fluids: Patient was given  mL/hr.     Past Medical History:      Diagnosis Date    [facility]     BC (acute kidney injury) ([facility] 10/1/2019    Cephalgia 10/9/2019    Chronic kidney disease     Depression     Diabetes mellitus ([facility]     Diabetic ketoacidosis ([facility] 2011    Hemodialysis patient ([facility]     History of blood transfusion 2019    Iron deficiency anemia 10/1/2019    MDRO (multiple drug resistant organisms) resistance     MRSA (methicillin resistant Staphylococcus aureus)     back wound abcess    Non compliance w medication regimen 3/30/2016    Other disorders of kidney and ureter     Pregnancy 3/30/2016    16 weeks    Severe pre-eclampsia in third trimester 2016       Past Surgical History:        Procedure Laterality Date    BACK SURGERY      abscess     SECTION      x2    CHOLECYSTECTOMY, LAPAROSCOPIC N/A 2019    CHOLECYSTECTOMY LAPAROSCOPIC performed by Alix Farfan MD at [address] [place] [address] N/A 2018    COLONOSCOPY WITH BIOPSY performed by Medardo Andres MD at [address] [place] [address] COLONOSCOPY N/A 2018    COLONOSCOPY WITH BIOPSY performed by Ember Hernandez MD at [address] [place] [address] ECHO COMPL [address]  2012    EF 57%    ECHO COMPL W DOP COLOR FLOW  6/10/2013         LAPAROSCOPY INSERTION PERITONEAL CATHETER N/A 2020    LAPAROSCOPIC INSERTION PERITONEAL DIALYSIS CATHETER performed by Lion Cheung MD at [facility] ESOPHAGOGASTRODUODENOSCOPY TRANSORAL DIAGNOSTIC N/A 2018    EGD ESOPHAGOGASTRODUODENOSCOPY performed by Medardo Andres MD at [address] [place] [address] 10/17/19 6/23/20  Eri Montoya MD   insulin aspart (NOVOLOG) 100 UNIT/ML injection vial Inject 0-10 Units into the skin 3 times daily (before meals) *Per Sliding Scale*    Historical Provider, MD   glucose (GLUTOSE) 40 % GEL Take 15 g by mouth as needed 4/1/16   Digna Shukla MD   glucose blood VI test strips (ASCENSIA AUTODISC VI;ONE TOUCH ULTRA TEST VI) strip Indications: 5x a day Freestyle Lite -Tests 5 times daily and as needed for low glucose. E10.10 4/1/16   Digna Shukla MD   LANCETS THIN by Does not apply route. Indications: cks 5xa a day    Historical Provider, MD   Insulin Syringe-Needle U-100 (INSULIN SYRINGE .5CC/30GX1/2\") 30G X 1/2\" 0.5 ML MISC by Does not apply route. Indications: uses 6-7 a day    Historical Provider, MD       Allergies: Toradol [ketorolac tromethamine]    Social History:   TOBACCO:   reports that she has been smoking cigarettes. She has a 3.00 pack-year smoking history. She uses smokeless tobacco.  ETOH:   reports no history of alcohol use. Family History:       Problem Relation Age of Onset   [facility] Asthma Mother     Hypertension Mother     High Blood Pressure Mother     Diabetes Mother     Asthma Brother     High Blood Pressure Father        REVIEW OF SYSTEMS:    Review of Systems   Constitutional: Positive for appetite change and fatigue. Negative for chills and fever. HENT: Negative for rhinorrhea and sore throat. Eyes: Positive for visual disturbance. Respiratory: Positive for shortness of breath. Negative for cough. Cardiovascular: Negative for chest pain and leg swelling. Gastrointestinal: Positive for abdominal pain, diarrhea, nausea and vomiting. Negative for blood in stool. Genitourinary: Positive for difficulty urinating, dysuria and vaginal discharge. Negative for hematuria. Musculoskeletal: Positive for back pain. Neurological: Positive for light-headedness. Negative for syncope.      Physical Exam     Vitals: /76   Pulse 110   Temp 97.6 °F (36.4 °C)   Resp 12   LMP 07/01/2020 (Approximate)   SpO2 97%      General Appearance: alert and oriented to person, place and time and in no acute distress   Skin: warm and dry, no rash or erythema   Head: normocephalic and atraumatic   Eyes: pupils equal, round, and reactive to light and extraocular eye movements intact   Neck: neck supple and non tender without mass    Pulmonary/Chest: clear to auscultation bilaterally- no wheezes, rales or rhonchi, normal air movement, no respiratory distress   Cardiovascular: normal rate, normal S1 and S2, no gallops, intact distal pulses and no carotid bruits   Abdomen: Soft, non-distended, no masses, no organomegaly and tenderness present- across the lower abdomen and suprapubic region,  without rebound and guarding. Peritoneal catheter in place in the LLQ. No surrounding erythema or warmth. No CVA tenderness.    Extremities: no edema   Musculoskeletal: normal range of motion, no joint swelling, deformity or tenderness   Neurologic: no cranial nerve deficit    Labs and Imaging Studies     Basic Labs:  Recent Labs     07/16/20  0900 07/16/20  1305 07/16/20  2031    145 140   K 6.6* 3.8 4.1   CL 97* 106 102   CO2 23 29 27   BUN 23* 21* 21*   CREATININE 5.2* 5.1* 5.3*   GLUCOSE 609* 42* 187*   CALCIUM 8.5* 9.0 8.9       Recent Labs     07/14/20  2203 07/16/20  0900 07/16/20  2031   WBC 7.4  --  11.2   RBC 3.86  --  3.58   HGB 12.0 10.2* 11.1*   HCT 38.7 33.3* 34.9   .3*  --  97.5   MCH 31.1  --  31.0   MCHC 31.0*  --  31.8*   RDW 14.0  --  14.2     --  265   MPV 11.2  --  10.3      7/16/2020 20:31   Pro-BNP 4,696 (H)   Troponin 0.06 (H)      7/16/2020 20:31   Albumin 2.9 (L)   Alk Phos 162 (H)   ALT 9   AST 11   Bilirubin <0.2   Lipase 6 (L)   Total Protein 5.7 (L)      7/16/2020 20:31   Beta-Hydroxybutyrate 0.70 (H)   Glucose 187 (H)      7/16/2020 20:31   pH, Khurram 7.37     Imaging Studies:     Xr Chest Standard (2 Vw)    Result Date: 2020  Patient MRN: [id number] : 1990 Age:  31 years Gender: Female Order Date: 2020 7:45 PM Exam: XR CHEST (2 VW) Number of Images: 2 views Indication:   sob sob Comparison: 2020 Findings: There is a central venous catheter noted the tip is in the right atrium there is a MediPort also present with its tip in the superior vena cava The heart is unremarkable The lung fields demonstrate no significant pulmonary vascular congestion and edema. The aorta is unremarkable There are no acute infiltrates seen throughout the lung fields. Chronic changes are noted     No acute infiltrate     EKG: sinus tachycardia, no ischemic changes. Resident's Assessment and Plan     Andrei0 Evert Maldonado is a 31 y.o. female with a PMHx of DM type 1, HTN, CKD stage 5 on PD, gastroparesis and nephrolithiasis who presented with intractable vomiting, abd pain, fatigue, and SOB. 1. Abdominal pain of unclear etiology  Associated with intractable nausea and vomiting. DDx:   Appendicitis: periumbilical and lower abdominal pain however no rebound/guarding or leukocytosis/fever. Peritonitis: 2/2 PD catheter insertion. Cath site appeared uninfected and abdomen was soft. Nephrolithiasis: Previous hx of stones. Blood present on UA. Pyelonephritis: Being treated for UTI. Has flank pain however no CVA tenderness or WBC casts seen on UA. Ectopic pregnancy: Negative urine Hcg on . Ovarian torsion: Relapsing/remitting lower abdominal pain. Pancreatitis: Less likely with lipase of 6. Diverticulitis/Diverticulosis: Shelley Mocha given age and no presence of bloody stools.  US abd complete   Daily CBC    2. Intractable nausea and vomiting  May be secondary to nephrolithiasis vs progressive gastroparesis vs morning sickness. Associated with some loss of appetite as well.   · Reglan 10 g IV Q6H  · Continue IVF,  mL/hr  · F/U US abd  · Check cortisol, TSH  · F/U urine HCG    3. UTI  Urine cx (20) grew Klebsiella that was

## 2020-07-17 NOTE — PROGRESS NOTES
Ricardo served Dr. Carla Damon at 8:30 am this day. Waiting on clarification of ultrasound order. Have not received instructions as of this time.

## 2020-07-18 ENCOUNTER — APPOINTMENT (OUTPATIENT)
Dept: CT IMAGING | Age: 30
DRG: 048 | End: 2020-07-18
Payer: COMMERCIAL

## 2020-07-18 LAB
ALBUMIN SERPL-MCNC: 2.5 G/DL (ref 3.5–5.2)
ALP BLD-CCNC: 133 U/L (ref 35–104)
ALT SERPL-CCNC: 7 U/L (ref 0–32)
ANION GAP SERPL CALCULATED.3IONS-SCNC: 14 MMOL/L (ref 7–16)
APPEARANCE FLUID: CLEAR
AST SERPL-CCNC: 11 U/L (ref 0–31)
BASOPHILS ABSOLUTE: 0.1 E9/L (ref 0–0.2)
BASOPHILS RELATIVE PERCENT: 1.4 % (ref 0–2)
BILIRUB SERPL-MCNC: <0.2 MG/DL (ref 0–1.2)
BILIRUBIN DIRECT: <0.2 MG/DL (ref 0–0.3)
BILIRUBIN, INDIRECT: ABNORMAL MG/DL (ref 0–1)
BUN BLDV-MCNC: 22 MG/DL (ref 6–20)
CALCIUM SERPL-MCNC: 8.1 MG/DL (ref 8.6–10.2)
CELL COUNT FLUID TYPE: NORMAL
CHLORIDE BLD-SCNC: 103 MMOL/L (ref 98–107)
CO2: 22 MMOL/L (ref 22–29)
COLOR FLUID: COLORLESS
CORTISOL TOTAL: 5.18 MCG/DL (ref 2.68–18.4)
CREAT SERPL-MCNC: 4.5 MG/DL (ref 0.5–1)
EOSINOPHILS ABSOLUTE: 0.27 E9/L (ref 0.05–0.5)
EOSINOPHILS RELATIVE PERCENT: 3.7 % (ref 0–6)
FERRITIN: 526 NG/ML
GFR AFRICAN AMERICAN: 14
GFR NON-AFRICAN AMERICAN: 14 ML/MIN/1.73
GLUCOSE BLD-MCNC: 219 MG/DL (ref 74–99)
HCT VFR BLD CALC: 31.8 % (ref 34–48)
HEMOGLOBIN: 10 G/DL (ref 11.5–15.5)
IMMATURE GRANULOCYTES #: 0.03 E9/L
IMMATURE GRANULOCYTES %: 0.4 % (ref 0–5)
IRON SATURATION: 88 % (ref 15–50)
IRON: 72 MCG/DL (ref 37–145)
LYMPHOCYTES ABSOLUTE: 1.86 E9/L (ref 1.5–4)
LYMPHOCYTES RELATIVE PERCENT: 25.3 % (ref 20–42)
MCH RBC QN AUTO: 31 PG (ref 26–35)
MCHC RBC AUTO-ENTMCNC: 31.4 % (ref 32–34.5)
MCV RBC AUTO: 98.5 FL (ref 80–99.9)
METER GLUCOSE: 121 MG/DL (ref 74–99)
METER GLUCOSE: 123 MG/DL (ref 74–99)
METER GLUCOSE: 163 MG/DL (ref 74–99)
METER GLUCOSE: 211 MG/DL (ref 74–99)
MONOCYTE, FLUID: 100 %
MONOCYTES ABSOLUTE: 0.43 E9/L (ref 0.1–0.95)
MONOCYTES RELATIVE PERCENT: 5.9 % (ref 2–12)
NEUTROPHIL, FLUID: 0 %
NEUTROPHILS ABSOLUTE: 4.65 E9/L (ref 1.8–7.3)
NEUTROPHILS RELATIVE PERCENT: 63.3 % (ref 43–80)
NUCLEATED CELLS FLUID: 6 /UL
PDW BLD-RTO: 14.3 FL (ref 11.5–15)
PLATELET # BLD: 242 E9/L (ref 130–450)
PMV BLD AUTO: 10.5 FL (ref 7–12)
POTASSIUM SERPL-SCNC: 3.4 MMOL/L (ref 3.5–5)
RBC # BLD: 3.23 E12/L (ref 3.5–5.5)
RBC FLUID: <2000 /UL
SODIUM BLD-SCNC: 139 MMOL/L (ref 132–146)
TOTAL IRON BINDING CAPACITY: 82 MCG/DL (ref 250–450)
TOTAL PROTEIN: 4.9 G/DL (ref 6.4–8.3)
TSH SERPL DL<=0.05 MIU/L-ACNC: 12.14 UIU/ML (ref 0.27–4.2)
WBC # BLD: 7.3 E9/L (ref 4.5–11.5)

## 2020-07-18 PROCEDURE — 80048 BASIC METABOLIC PNL TOTAL CA: CPT

## 2020-07-18 PROCEDURE — 82533 TOTAL CORTISOL: CPT

## 2020-07-18 PROCEDURE — 2060000000 HC ICU INTERMEDIATE R&B

## 2020-07-18 PROCEDURE — 83540 ASSAY OF IRON: CPT

## 2020-07-18 PROCEDURE — 85025 COMPLETE CBC W/AUTO DIFF WBC: CPT

## 2020-07-18 PROCEDURE — C9113 INJ PANTOPRAZOLE SODIUM, VIA: HCPCS | Performed by: INTERNAL MEDICINE

## 2020-07-18 PROCEDURE — 89051 BODY FLUID CELL COUNT: CPT

## 2020-07-18 PROCEDURE — 6360000004 HC RX CONTRAST MEDICATION: Performed by: RADIOLOGY

## 2020-07-18 PROCEDURE — 83550 IRON BINDING TEST: CPT

## 2020-07-18 PROCEDURE — 6370000000 HC RX 637 (ALT 250 FOR IP): Performed by: INTERNAL MEDICINE

## 2020-07-18 PROCEDURE — 99232 SBSQ HOSP IP/OBS MODERATE 35: CPT | Performed by: INTERNAL MEDICINE

## 2020-07-18 PROCEDURE — 84443 ASSAY THYROID STIM HORMONE: CPT

## 2020-07-18 PROCEDURE — 87070 CULTURE OTHR SPECIMN AEROBIC: CPT

## 2020-07-18 PROCEDURE — 2580000003 HC RX 258: Performed by: INTERNAL MEDICINE

## 2020-07-18 PROCEDURE — 82962 GLUCOSE BLOOD TEST: CPT

## 2020-07-18 PROCEDURE — 90945 DIALYSIS ONE EVALUATION: CPT

## 2020-07-18 PROCEDURE — 2580000003 HC RX 258: Performed by: RADIOLOGY

## 2020-07-18 PROCEDURE — 6360000002 HC RX W HCPCS: Performed by: INTERNAL MEDICINE

## 2020-07-18 PROCEDURE — 87491 CHLMYD TRACH DNA AMP PROBE: CPT

## 2020-07-18 PROCEDURE — 80076 HEPATIC FUNCTION PANEL: CPT

## 2020-07-18 PROCEDURE — 87205 SMEAR GRAM STAIN: CPT

## 2020-07-18 PROCEDURE — 87040 BLOOD CULTURE FOR BACTERIA: CPT

## 2020-07-18 PROCEDURE — 74177 CT ABD & PELVIS W/CONTRAST: CPT

## 2020-07-18 PROCEDURE — 82728 ASSAY OF FERRITIN: CPT

## 2020-07-18 PROCEDURE — 36415 COLL VENOUS BLD VENIPUNCTURE: CPT

## 2020-07-18 PROCEDURE — 87591 N.GONORRHOEAE DNA AMP PROB: CPT

## 2020-07-18 RX ORDER — POTASSIUM CHLORIDE 7.45 MG/ML
10 INJECTION INTRAVENOUS
Status: COMPLETED | OUTPATIENT
Start: 2020-07-18 | End: 2020-07-18

## 2020-07-18 RX ORDER — LORAZEPAM 0.5 MG/1
0.5 TABLET ORAL 2 TIMES DAILY PRN
Status: DISCONTINUED | OUTPATIENT
Start: 2020-07-18 | End: 2020-07-20

## 2020-07-18 RX ORDER — SODIUM CHLORIDE 0.9 % (FLUSH) 0.9 %
10 SYRINGE (ML) INJECTION
Status: COMPLETED | OUTPATIENT
Start: 2020-07-18 | End: 2020-07-18

## 2020-07-18 RX ORDER — GENTAMICIN SULFATE 1 MG/G
CREAM TOPICAL DAILY
Status: DISCONTINUED | OUTPATIENT
Start: 2020-07-18 | End: 2020-07-21 | Stop reason: HOSPADM

## 2020-07-18 RX ORDER — LEVOTHYROXINE SODIUM 0.05 MG/1
50 TABLET ORAL DAILY
Status: DISCONTINUED | OUTPATIENT
Start: 2020-07-18 | End: 2020-07-21 | Stop reason: HOSPADM

## 2020-07-18 RX ORDER — OXYCODONE HYDROCHLORIDE AND ACETAMINOPHEN 5; 325 MG/1; MG/1
1 TABLET ORAL ONCE
Status: COMPLETED | OUTPATIENT
Start: 2020-07-18 | End: 2020-07-18

## 2020-07-18 RX ORDER — METOCLOPRAMIDE 5 MG/1
5 TABLET ORAL
Status: DISCONTINUED | OUTPATIENT
Start: 2020-07-18 | End: 2020-07-21 | Stop reason: HOSPADM

## 2020-07-18 RX ADMIN — PANTOPRAZOLE SODIUM 40 MG: 40 INJECTION, POWDER, FOR SOLUTION INTRAVENOUS at 11:00

## 2020-07-18 RX ADMIN — LEVOTHYROXINE SODIUM 50 MCG: 0.05 TABLET ORAL at 10:46

## 2020-07-18 RX ADMIN — DILTIAZEM HYDROCHLORIDE 240 MG: 240 CAPSULE, EXTENDED RELEASE ORAL at 10:46

## 2020-07-18 RX ADMIN — METOCLOPRAMIDE HYDROCHLORIDE 5 MG: 5 TABLET ORAL at 16:41

## 2020-07-18 RX ADMIN — INSULIN GLARGINE 10 UNITS: 100 INJECTION, SOLUTION SUBCUTANEOUS at 09:00

## 2020-07-18 RX ADMIN — POTASSIUM CHLORIDE 10 MEQ: 10 INJECTION, SOLUTION INTRAVENOUS at 09:00

## 2020-07-18 RX ADMIN — CEFTRIAXONE SODIUM 1 G: 1 INJECTION, POWDER, FOR SOLUTION INTRAMUSCULAR; INTRAVENOUS at 05:28

## 2020-07-18 RX ADMIN — METOCLOPRAMIDE HYDROCHLORIDE 5 MG: 5 TABLET ORAL at 10:46

## 2020-07-18 RX ADMIN — METOPROLOL TARTRATE 100 MG: 50 TABLET, FILM COATED ORAL at 22:11

## 2020-07-18 RX ADMIN — SODIUM CHLORIDE, PRESERVATIVE FREE 10 ML: 5 INJECTION INTRAVENOUS at 11:00

## 2020-07-18 RX ADMIN — OXYCODONE AND ACETAMINOPHEN 1 TABLET: 5; 325 TABLET ORAL at 18:39

## 2020-07-18 RX ADMIN — BUMETANIDE 1 MG: 1 TABLET ORAL at 10:46

## 2020-07-18 RX ADMIN — ACETAMINOPHEN 650 MG: 325 TABLET ORAL at 10:46

## 2020-07-18 RX ADMIN — METOCLOPRAMIDE HYDROCHLORIDE 5 MG: 5 TABLET ORAL at 12:48

## 2020-07-18 RX ADMIN — IOPAMIDOL 110 ML: 755 INJECTION, SOLUTION INTRAVENOUS at 13:12

## 2020-07-18 RX ADMIN — INSULIN GLARGINE 10 UNITS: 100 INJECTION, SOLUTION SUBCUTANEOUS at 22:12

## 2020-07-18 RX ADMIN — METOPROLOL TARTRATE 100 MG: 50 TABLET, FILM COATED ORAL at 10:46

## 2020-07-18 RX ADMIN — Medication 10 ML: at 13:13

## 2020-07-18 RX ADMIN — ONDANSETRON HYDROCHLORIDE 4 MG: 2 SOLUTION INTRAMUSCULAR; INTRAVENOUS at 08:44

## 2020-07-18 RX ADMIN — POTASSIUM CHLORIDE 10 MEQ: 10 INJECTION, SOLUTION INTRAVENOUS at 08:00

## 2020-07-18 RX ADMIN — INSULIN LISPRO 2 UNITS: 100 INJECTION, SOLUTION INTRAVENOUS; SUBCUTANEOUS at 16:47

## 2020-07-18 RX ADMIN — SODIUM CHLORIDE, PRESERVATIVE FREE 10 ML: 5 INJECTION INTRAVENOUS at 10:47

## 2020-07-18 ASSESSMENT — PAIN SCALES - GENERAL
PAINLEVEL_OUTOF10: 0
PAINLEVEL_OUTOF10: 10
PAINLEVEL_OUTOF10: 10

## 2020-07-18 NOTE — FLOWSHEET NOTE
ccpd initiated using aseptic technique. Pt draining and filling w/o complications. Pt refused drsg change at this time. Cultures drawn and walked to lab.

## 2020-07-18 NOTE — PROGRESS NOTES
Department of Internal Medicine  Nephrology Attending Consult Note      SUBJECTIVE:  We are following [address] [place] [address] for ESRD on PD. Reports having diarrhea and abdominal pain. Reports no vomiting but still with nausea.     PHYSICAL EXAM:      Vitals:    VITALS:  /87   Pulse 70   Temp 96.6 °F (35.9 °C) (Temporal)   Resp 16   Wt 125 lb 7.1 oz (56.9 kg)   LMP 07/01/2020 (Approximate)   SpO2 100%   BMI 22.22 kg/m²   24HR INTAKE/OUTPUT:      Intake/Output Summary (Last 24 hours) at 7/18/2020 0948  Last data filed at 7/18/2020 0730  Gross per 24 hour   Intake 1742 ml   Output 1457 ml   Net 285 ml       Constitutional:  Alert and oriented, in no distress  HEENT:  Normocephalic, PERRL  Respiratory:  CTA bilaterally  Cardiovascular/Edema:  RRR, S1/S2  Gastrointestinal:  Soft, + tenderness, PD catheter exit site clean   Neurologic:  Nonfocal, AVELAR  Skin:  Warm, dry, no lesions  Other:  Right IJ tunneled dialysis catheter in place, L chest mediport accessed  No edema    Scheduled Meds:   metoclopramide  5 mg Oral TID WC    potassium chloride  10 mEq Intravenous Q1H    levothyroxine  50 mcg Oral Daily    bumetanide  1 mg Oral Daily    atorvastatin  40 mg Oral Nightly    metoprolol  100 mg Oral BID    dilTIAZem  240 mg Oral Daily    pantoprazole  40 mg Intravenous Daily    And    sodium chloride (PF)  10 mL Intravenous Daily    sodium chloride flush  10 mL Intravenous 2 times per day    nicotine  1 patch Transdermal Daily    heparin (porcine)  5,000 Units Subcutaneous Q8H    cefTRIAXone (ROCEPHIN) IV  1 g Intravenous Q24H    insulin glargine  10 Units Subcutaneous BID    insulin lispro  0-6 Units Subcutaneous TID WC    insulin lispro  0-3 Units Subcutaneous Nightly     Continuous Infusions:   dextrose      sodium chloride 60 mL/hr at 07/17/20 1945     PRN Meds:.sodium chloride flush, acetaminophen **OR** acetaminophen, polyethylene glycol, ondansetron **OR** ondansetron, glucose, dextrose, glucagon (rDNA), dextrose, trimethobenzamide    DATA:    CBC with Differential:    Lab Results   Component Value Date    WBC 7.3 07/18/2020    RBC 3.23 07/18/2020    HGB 10.0 07/18/2020    HCT 31.8 07/18/2020     07/18/2020    MCV 98.5 07/18/2020    MCH 31.0 07/18/2020    MCHC 31.4 07/18/2020    RDW 14.3 07/18/2020    SEGSPCT 67 06/27/2013    LYMPHOPCT 25.3 07/18/2020    MONOPCT 5.9 07/18/2020    BASOPCT 1.4 07/18/2020    MONOSABS 0.43 07/18/2020    LYMPHSABS 1.86 07/18/2020    EOSABS 0.27 07/18/2020    BASOSABS 0.10 07/18/2020     CMP:    Lab Results   Component Value Date     07/18/2020    K 3.4 07/18/2020    K 4.1 07/16/2020     07/18/2020    CO2 22 07/18/2020    BUN 22 07/18/2020    CREATININE 4.5 07/18/2020    GFRAA 14 07/18/2020    LABGLOM 14 07/18/2020    GLUCOSE 219 07/18/2020    GLUCOSE 130 05/18/2012    PROT 4.9 07/18/2020    LABALBU 2.5 07/18/2020    LABALBU 4.1 05/18/2012    CALCIUM 8.1 07/18/2020    BILITOT <0.2 07/18/2020    ALKPHOS 133 07/18/2020    AST 11 07/18/2020    ALT 7 07/18/2020     Magnesium:    Lab Results   Component Value Date    MG 2.1 07/14/2020     Phosphorus:    Lab Results   Component Value Date    PHOS 2.2 07/14/2020     Radiology Review:      CXR 7/16/2020   No acute infiltrate           Kidney ultrasound July 17, 2020   Increased echogenicity of the kidneys, this can be seen    with medical renal disease, otherwise unremarkable study.             BRIEF SUMMARY OF INITIAL CONSULT:    Briefly, Paola Rubalcava is a 31year-old female with history of ESRD on RRT (transitioned from intermittent hemodialysis to PD 1 week ago), with severe proteinuria, poorly controlled type I DM with multiple admissions for DKA, gastroparesis, HTN and recently diagnosed with UTI on 7/14 and treated with cipro, who presented to the ER on 7/16/2020 with complaints of nausea and vomiting x 2 weeks.  She reported that she has been unable to keep anything down for the last few days and that her blood glucose had been anywhere from  mg/dL. She complains as well of abdominal pain to bilateral lower quadrants which radiates to her right flank. Labs obtained in ER were significant for BHB 0.70, glucose 666, BUN 21, Cr 5.3 and BNP 4,696. A CXR showed no acute findings. UA showed >1000 glucose with trace ketones, moderate blood, small leukocytes and microscopy showed 5-10 WBC. She was admitted for futher treatment and evaluation. We are consulted for management of dialysis. She is s/p PD catheter placement on 6/26/2020. PD was one week ago and she denies any issues with it at home. Last PD treatment per patient was the evening of 7/15/2020. Problems resolved:    · Hyponatremia, translocational in the setting of hyperglycemia    IMPRESSION/RECOMMENDATIONS:      1. ESRD on RRT, with severe proteinuria, nephrotic range, due to diabetic nephropathy,  recently transitioned from IHD to PD. RIJ tunneled dialysis catheter remains in place. Having CCPD, PD fluid clear this morning. 2. Type I DM, poorly controlled, glucose on admission 666; glucose levels improved. 3. Intractable nausea and vomiting 2/2 diabetic gastroparesis, improved with reglan, IVF and zofran  4. Abdominal pain and diarrhea, to have CT scan of the abdomen, will obtain PD cell count to rule out PD associated peritonitis  5. UTI, on ceftriaxone, urine culture pending  2. HTN, on metoprolol and diltiazem  3. HLD, on atorvastatin  4. Hypoalbuminemia 2/2 poor nutrition  5.  Anemia of CKD, Hgb 11.1, ok to hold MARIA TERESA     Plan:    · Continue CCPD 2 liters exchanges of 1.5% x 4 and long dwell of 2.5% 2 liters,  · Continue normal saline to 60 mL/hr   · Obtain PD cell count and culture  · Continue to monitor labs      Electronically signed by Hussain Soto MD on 7/18/2020 at 9:48 AM

## 2020-07-18 NOTE — PROGRESS NOTES
[facility]  Internal Medicine Residency Program  Progress Note  - House Team 1    Patient:  Judge Upton 31 y.o. female MRN: [id number]     Date of Service: 7/18/2020     CC: Abdominal pain, nausea/vomiting, fatigue and wt loss  Overnight events: N/a    Subjective       Mrs. Isidro Neville is doing slightly better today. She thinks her nausea and vomiting are under control and she has been able to eat several meals since being admitted. She continues to have frequency and dysuria, unchanged from 3 days ago. Her abdominal pain is unchanged as well, and she emphasizes the pain starting from her midline, hypogastric region. She remains fatigued. Upon standing she remains SOB and lightheaded. BP with lying 132/84, standing 94/65. She tolerated dialysis yesterday without any reported problems. She denies SOB at rest, CP, HA, fever, chills. Objective     Physical Exam:  · Vitals: BP (!) 152/92   Pulse 82   Temp 97.4 °F (36.3 °C) (Oral)   Resp 18   Wt 125 lb 7.1 oz (56.9 kg)   LMP 07/01/2020 (Approximate)   SpO2 97%   BMI 22.22 kg/m²     · I & O - 24hr: No intake/output data recorded. · General Appearance: alert, appears stated age, cooperative and fatigued  · Abdomen: Soft, no guarding or rigidity. Tenderness to palpation in the hypogastrium with center of tenderness midline caudal to umbilicus. Mild rebound tenderness. · Extremities:  Extremities grossly normal. 1cm crust with surrounding purple blue discoloration on dorsolateral left foot. · Musculokeletal: No joint swelling, no muscle tenderness. ROM normal in all joints of extremities.    · Neurologic: Mental status: Alert, oriented, thought content appropriate    Pertinent Labs & Imaging Studies     EXAM: US RETROPERITONEAL COMPLETE   NUMBER OF IMAGES:  87 views   INDICATION:  Abdominal Pain; evaluate for kidney stones/hydronephrosis     Abdominal Pain; evaluate for kidney stones/hydronephrosis   COMPARISON: None     The right kidney is 10.1 x 4.1 x 3.9 cm   The left kidney  is 9.7 x 5.1 x 5.6 cm   There is no mass identified   There is no hydronephrosis identified   There is no calculus identified   The bladder is unremarkable   There is increased echogenicity at the level the renal cortex   bilaterally        Impression:         Increased echogenicity of the kidneys, this can be seen   with medical renal disease, otherwise unremarkable study. Student's Assessment and Plan     Yash Maldonado is a 31 y.o. female who presented to the ER two days ago with progressive abdominal pain, nausea/vomiting with assd fatigue and wt loss. 1. Persistent hypogastric abdominal pain   -US complete was unremarkable   -Unlikely pain represents PID as WBC and inflammatory markers are normal, and pt is afebrile, but will DNA test urine for gonorrhea/chlamydia to rule it out.   -Pt does have orthostatic hypotension possibly representing autonomic dysfunction 2/2 poorly controlled diabetes- US complete showed no enlargement of bladder.   -Pain onset was several days after peritoneal dialysis catheter was placed. Peritoneal irritation may be the cause. 2. UTI and Trichomonas Vaginitis   -UTI being managed with ceftriaxone 1g q24hrs.   -2mg Flagyl one time was given yesterday for trichomonal infection.    -Urine DNA test for gonorrhea/chlamydia evaluation. 3. Nausea/vomiting   -Well controlled. Continue zofran 4mg q8hrs PRN and reglan 5mg TID. 4. Fatigue   -Likely due to decreased appetite and oral intake over the last several weeks. Continue to encourage oral intake and control nausea/vomiting as above. 5. Orthostatic hypotension - lying 132/84, standing 94/65 today   -likely related to pts diabetes as she does have neuropathy in both feet and some decreased pupillary light response.    -Pt needs better glucose control to prevent progression of her autonomic symptoms.  Social work will schedule and appt with PCP at discharge

## 2020-07-18 NOTE — PROGRESS NOTES
[address]  Internal Medicine Residency Program  Progress Note - House Team 1    Patient:  Ibrahima Abdullahi 31 y.o. female MRN: [id number]     Date of Service: 7/18/2020     CC: Intractable vomiting, abdominal pain, fatigue, SOB  Overnight events: /92 7PM yesterday and 127/87 this AM. Otherwise, vital signs stable. Blood glucose down to 219 mg/dL. patient underwent peritoneal dialysis overnight. Subjective     Patient seen and examined at bedside. Patient states improvement of nausea. No vomiting. Good appetite. Ate all food at dinner. Still has complaint of lower abdominal/suprapubic pain with slight rebound. Objective     Physical Exam:  · Vitals: /87   Pulse 70   Temp 96.6 °F (35.9 °C) (Temporal)   Resp 16   Wt 125 lb 7.1 oz (56.9 kg)   LMP 07/01/2020 (Approximate)   SpO2 100%   BMI 22.22 kg/m²     · I & O - 24hr: I/O this shift:  · In: -   · Out: 1207    · General Appearance: alert, appears stated age, cooperative and no distress  · HEENT:  Head: Normal, normocephalic, atraumatic. · Eye: Normal external eye, conjunctiva, lids cornea, KALI. · Neck: supple, symmetrical, trachea midline  · Lung: clear to auscultation bilaterally  · Heart: regular rate and rhythm, S1, S2 normal, no murmur, click, rub or gallop  · Abdomen: soft, non-distended, no masses, no organomegaly. lower abdominal and suprapubic tenderness. slight rebound on umbilical and lower abdominal and suprapubic region. no guarding. Peritoneal catheter in place in LLQ. no surrounding erythema or warmth. no CVA tenderness. · Extremities:  no edema  · Musculokeletal: No joint swelling, no muscle tenderness. ROM normal in all joints of extremities.    · Neurologic: Mental status: Alert, oriented, thought content appropriate  Subject  Pertinent Labs & Imaging Studies   jorge alberto  CBC with Differential:    Lab Results   Component Value Date    WBC 7.3 07/18/2020    RBC 3.23 07/18/2020    HGB 10.0 07/18/2020    HCT 31.8 07/18/2020     07/18/2020    MCV 98.5 07/18/2020    MCH 31.0 07/18/2020    MCHC 31.4 07/18/2020    RDW 14.3 07/18/2020    SEGSPCT 67 06/27/2013    LYMPHOPCT 25.3 07/18/2020    MONOPCT 5.9 07/18/2020    BASOPCT 1.4 07/18/2020    MONOSABS 0.43 07/18/2020    LYMPHSABS 1.86 07/18/2020    EOSABS 0.27 07/18/2020    BASOSABS 0.10 07/18/2020     CMP:    Lab Results   Component Value Date     07/18/2020    K 3.4 07/18/2020    K 4.1 07/16/2020     07/18/2020    CO2 22 07/18/2020    BUN 22 07/18/2020    CREATININE 4.5 07/18/2020    GFRAA 14 07/18/2020    LABGLOM 14 07/18/2020    GLUCOSE 219 07/18/2020    GLUCOSE 130 05/18/2012    PROT 4.9 07/18/2020    LABALBU 2.5 07/18/2020    LABALBU 4.1 05/18/2012    CALCIUM 8.1 07/18/2020    BILITOT <0.2 07/18/2020    ALKPHOS 133 07/18/2020    AST 11 07/18/2020    ALT 7 07/18/2020     Hepatic Function Panel:    Lab Results   Component Value Date    ALKPHOS 133 07/18/2020    ALT 7 07/18/2020    AST 11 07/18/2020    PROT 4.9 07/18/2020    BILITOT <0.2 07/18/2020    BILIDIR <0.2 07/18/2020    IBILI see below 07/18/2020    LABALBU 2.5 07/18/2020    LABALBU 4.1 05/18/2012     PT/INR:    Lab Results   Component Value Date    PROTIME 11.3 02/12/2020    PROTIME 13.6 08/14/2011    INR 1.0 02/12/2020     PTT:    Lab Results   Component Value Date    APTT 29.9 02/04/2020   [APTT}  Last 3 Troponin:    Lab Results   Component Value Date    TROPONINI 0.06 07/16/2020    TROPONINI 0.06 07/14/2020    TROPONINI 0.01 06/02/2020     U/A:    Lab Results   Component Value Date    COLORU Yellow 07/17/2020    PROTEINU >=300 07/17/2020    PHUR 6.0 07/17/2020    LABCAST RARE 01/12/2020    WBCUA 5-10 07/17/2020    WBCUA 0-1 05/18/2012    RBCUA 10-20 07/17/2020    RBCUA 1-3 08/01/2013    MUCUS Present 02/12/2016    TRICHOMONAS Present 07/17/2020    YEAST RARE 05/24/2018    BACTERIA FEW 07/17/2020    CLARITYU Clear 07/17/2020    SPECGRAV 1.020 07/17/2020    LEUKOCYTESUR SMALL 07/17/2020 UROBILINOGEN 0.2 07/17/2020    BILIRUBINUR Negative 07/17/2020    BILIRUBINUR SMALL 05/18/2012    BLOODU MODERATE 07/17/2020    GLUCOSEU >=1000 07/17/2020    GLUCOSEU >=1000 05/18/2012    AMORPHOUS RARE 11/01/2019     HgBA1c:    Lab Results   Component Value Date    LABA1C 8.8 01/23/2020     TSH:    Lab Results   Component Value Date    TSH 12.140 07/18/2020     Urine Toxicology:  No components found for: Elena Pander, ICOCAINE, IMARTHC, IOPIATES, IPHENCYC    07/16:  Beta-hydroxybutyrate: 0.70 mmol/L    7/17:  Urine drug screen: oxycodone positive. Otherwise negative. Urine HCG pregnancy test: negative    7/18:  Cortisol: 5.18 mcg/dL  TSH: 12.140 uIU/mL    US RETROPERITONEAL COMPLETE (07/17)  Impression:   Increased echogenicity of the kidneys, this can be seen with medical renal disease, otherwise unremarkable study. XR CHEST STANDARD (2 VW) (07/16)  Findings: There is a central venous catheter noted the tip is in the right atrium. there is a MediPort also present with its tip in the superior vena cava. The heart is unremarkable   The lung fields demonstrate no significant pulmonary vascular congestion and edema. The aorta is unremarkable   There are no acute infiltrates seen throughout the lung fields. Chronic changes are noted   Impression:  No acute infiltrate    Resident's Assessment and Plan     Yash [facility] Erica is a 31 y.o. female with PMHx of DM type 1, HTN, CKD stage 5 on PD, gastroparesis, nephrolithiasis, presented with intractable vomiting, abd pain, fatigue, and SOB. 1. Abdominal pain of unclear etiology 2/2 PD insertion complication vs. Appendicitis vs nephrolithiasis vs pyelonephritis vs. ectopic pregnancy    - associated with intractable nausea and vomiting.   - PE found abd soft and non-distend. lower abdominal and suprapubic tenderness. slight rebound on umbilical and lower abdominal and suprapubic region. no guarding.  Peritoneal catheter in place in LLQ. no surrounding AM.   - monitor BG.    7. HTN    - metoprolol tartrate 100 mg BID, cardizem  mg daily    8. HLD   - Atorvastatin 40 mg nightly    PT/OT evaluation: not needed  DVT prophylaxis/ GI prophylaxis: heparin / pantoprazole  Disposition: continue current care    Ramya Alves MD, PGY-1  Attending physician: Dr. Shree Valdez  [facility]    Attending Physician Statement:  Myles Olivas M.D., F.A.C.P. I have discussed the case, including pertinent history and exam findings with the resident/NP. I have seen and examined the patient and the key elements of the encounter have been performed by me. I agree with the resident ROS, PMHx, PSHx, meds reviewed and assessment, plan and orders as documented by the resident/NP      Hospital charts reviewed, including other providers notes, relevant labs and imaging. Recurrent abd pain  Ongoing N/V  - doubt peritonitis-- ongoing issues since peritoneal dialysis catheter placed  CT abd pending    UTI + trich - treating for both, work up STDs otherwise  dm1  Autonomic neuropathy - gastroparesis suspected and treated    >50% of time spent coordinating care with other providers and/or counseling patient/family  Remainder of medical problems as per resident note.

## 2020-07-19 LAB
ANION GAP SERPL CALCULATED.3IONS-SCNC: 13 MMOL/L (ref 7–16)
BASOPHILS ABSOLUTE: 0.07 E9/L (ref 0–0.2)
BASOPHILS RELATIVE PERCENT: 1.4 % (ref 0–2)
BETA-HYDROXYBUTYRATE: 0.08 MMOL/L (ref 0.02–0.27)
BUN BLDV-MCNC: 19 MG/DL (ref 6–20)
C DIFF TOXIN/ANTIGEN: NORMAL
C. DIFFICILE TOXIN MOLECULAR: NORMAL
CALCIUM IONIZED: 1.14 MMOL/L (ref 1.15–1.33)
CALCIUM SERPL-MCNC: 7.6 MG/DL (ref 8.6–10.2)
CHLORIDE BLD-SCNC: 105 MMOL/L (ref 98–107)
CO2: 22 MMOL/L (ref 22–29)
CREAT SERPL-MCNC: 4.4 MG/DL (ref 0.5–1)
EOSINOPHILS ABSOLUTE: 0.18 E9/L (ref 0.05–0.5)
EOSINOPHILS RELATIVE PERCENT: 3.6 % (ref 0–6)
GFR AFRICAN AMERICAN: 14
GFR NON-AFRICAN AMERICAN: 14 ML/MIN/1.73
GLUCOSE BLD-MCNC: 122 MG/DL (ref 74–99)
HBA1C MFR BLD: 8.6 % (ref 4–5.6)
HCT VFR BLD CALC: 28.9 % (ref 34–48)
HEMOGLOBIN: 9.1 G/DL (ref 11.5–15.5)
IMMATURE GRANULOCYTES #: 0.02 E9/L
IMMATURE GRANULOCYTES %: 0.4 % (ref 0–5)
LACTIC ACID: 2.6 MMOL/L (ref 0.5–2.2)
LYMPHOCYTES ABSOLUTE: 1.86 E9/L (ref 1.5–4)
LYMPHOCYTES RELATIVE PERCENT: 36.7 % (ref 20–42)
MAGNESIUM: 1.6 MG/DL (ref 1.6–2.6)
MCH RBC QN AUTO: 31.2 PG (ref 26–35)
MCHC RBC AUTO-ENTMCNC: 31.5 % (ref 32–34.5)
MCV RBC AUTO: 99 FL (ref 80–99.9)
METER GLUCOSE: 103 MG/DL (ref 74–99)
METER GLUCOSE: 150 MG/DL (ref 74–99)
METER GLUCOSE: 386 MG/DL (ref 74–99)
MONOCYTES ABSOLUTE: 0.36 E9/L (ref 0.1–0.95)
MONOCYTES RELATIVE PERCENT: 7.1 % (ref 2–12)
NEUTROPHILS ABSOLUTE: 2.58 E9/L (ref 1.8–7.3)
NEUTROPHILS RELATIVE PERCENT: 50.8 % (ref 43–80)
ORGANISM: ABNORMAL
PDW BLD-RTO: 14.3 FL (ref 11.5–15)
PHOSPHORUS: 4.4 MG/DL (ref 2.5–4.5)
PLATELET # BLD: 222 E9/L (ref 130–450)
PMV BLD AUTO: 10.5 FL (ref 7–12)
POTASSIUM SERPL-SCNC: 3.5 MMOL/L (ref 3.5–5)
RBC # BLD: 2.92 E12/L (ref 3.5–5.5)
SODIUM BLD-SCNC: 140 MMOL/L (ref 132–146)
URINE CULTURE, ROUTINE: ABNORMAL
VITAMIN D 25-HYDROXY: 12 NG/ML (ref 30–100)
WBC # BLD: 5.1 E9/L (ref 4.5–11.5)

## 2020-07-19 PROCEDURE — 82330 ASSAY OF CALCIUM: CPT

## 2020-07-19 PROCEDURE — C9113 INJ PANTOPRAZOLE SODIUM, VIA: HCPCS | Performed by: INTERNAL MEDICINE

## 2020-07-19 PROCEDURE — 6370000000 HC RX 637 (ALT 250 FOR IP): Performed by: INTERNAL MEDICINE

## 2020-07-19 PROCEDURE — 2060000000 HC ICU INTERMEDIATE R&B

## 2020-07-19 PROCEDURE — 36415 COLL VENOUS BLD VENIPUNCTURE: CPT

## 2020-07-19 PROCEDURE — 6360000002 HC RX W HCPCS: Performed by: INTERNAL MEDICINE

## 2020-07-19 PROCEDURE — 2580000003 HC RX 258: Performed by: NURSE PRACTITIONER

## 2020-07-19 PROCEDURE — 83605 ASSAY OF LACTIC ACID: CPT

## 2020-07-19 PROCEDURE — 84100 ASSAY OF PHOSPHORUS: CPT

## 2020-07-19 PROCEDURE — 87449 NOS EACH ORGANISM AG IA: CPT

## 2020-07-19 PROCEDURE — 80048 BASIC METABOLIC PNL TOTAL CA: CPT

## 2020-07-19 PROCEDURE — 83993 ASSAY FOR CALPROTECTIN FECAL: CPT

## 2020-07-19 PROCEDURE — 83735 ASSAY OF MAGNESIUM: CPT

## 2020-07-19 PROCEDURE — 2580000003 HC RX 258: Performed by: INTERNAL MEDICINE

## 2020-07-19 PROCEDURE — 83036 HEMOGLOBIN GLYCOSYLATED A1C: CPT

## 2020-07-19 PROCEDURE — 82962 GLUCOSE BLOOD TEST: CPT

## 2020-07-19 PROCEDURE — 82010 KETONE BODYS QUAN: CPT

## 2020-07-19 PROCEDURE — 82306 VITAMIN D 25 HYDROXY: CPT

## 2020-07-19 PROCEDURE — 87493 C DIFF AMPLIFIED PROBE: CPT

## 2020-07-19 PROCEDURE — 87324 CLOSTRIDIUM AG IA: CPT

## 2020-07-19 PROCEDURE — 99232 SBSQ HOSP IP/OBS MODERATE 35: CPT | Performed by: INTERNAL MEDICINE

## 2020-07-19 PROCEDURE — 85025 COMPLETE CBC W/AUTO DIFF WBC: CPT

## 2020-07-19 PROCEDURE — 90945 DIALYSIS ONE EVALUATION: CPT

## 2020-07-19 RX ORDER — LINEZOLID 600 MG/1
600 TABLET, FILM COATED ORAL EVERY 12 HOURS SCHEDULED
Status: DISCONTINUED | OUTPATIENT
Start: 2020-07-19 | End: 2020-07-19

## 2020-07-19 RX ORDER — PHENAZOPYRIDINE HYDROCHLORIDE 100 MG/1
200 TABLET, FILM COATED ORAL
Status: DISCONTINUED | OUTPATIENT
Start: 2020-07-19 | End: 2020-07-19

## 2020-07-19 RX ORDER — METHYLPREDNISOLONE SODIUM SUCCINATE 40 MG/ML
40 INJECTION, POWDER, LYOPHILIZED, FOR SOLUTION INTRAMUSCULAR; INTRAVENOUS DAILY
Status: DISCONTINUED | OUTPATIENT
Start: 2020-07-19 | End: 2020-07-21 | Stop reason: HOSPADM

## 2020-07-19 RX ORDER — ATROPA BELLADONNA AND OPIUM 16.2; 6 MG/1; MG/1
60 SUPPOSITORY RECTAL EVERY 8 HOURS PRN
Status: DISCONTINUED | OUTPATIENT
Start: 2020-07-19 | End: 2020-07-21 | Stop reason: HOSPADM

## 2020-07-19 RX ADMIN — METOCLOPRAMIDE HYDROCHLORIDE 5 MG: 5 TABLET ORAL at 17:24

## 2020-07-19 RX ADMIN — METOPROLOL TARTRATE 100 MG: 50 TABLET, FILM COATED ORAL at 08:44

## 2020-07-19 RX ADMIN — HEPARIN SODIUM 5000 UNITS: 10000 INJECTION, SOLUTION INTRAVENOUS; SUBCUTANEOUS at 22:00

## 2020-07-19 RX ADMIN — METOPROLOL TARTRATE 100 MG: 50 TABLET, FILM COATED ORAL at 20:27

## 2020-07-19 RX ADMIN — GENTAMICIN SULFATE: 1 CREAM TOPICAL at 08:44

## 2020-07-19 RX ADMIN — PANTOPRAZOLE SODIUM 40 MG: 40 INJECTION, POWDER, FOR SOLUTION INTRAVENOUS at 08:44

## 2020-07-19 RX ADMIN — LORAZEPAM 0.5 MG: 0.5 TABLET ORAL at 00:47

## 2020-07-19 RX ADMIN — INSULIN GLARGINE 10 UNITS: 100 INJECTION, SOLUTION SUBCUTANEOUS at 08:45

## 2020-07-19 RX ADMIN — METHYLPREDNISOLONE SODIUM SUCCINATE 40 MG: 40 INJECTION, POWDER, FOR SOLUTION INTRAMUSCULAR; INTRAVENOUS at 12:26

## 2020-07-19 RX ADMIN — BUMETANIDE 1 MG: 1 TABLET ORAL at 08:44

## 2020-07-19 RX ADMIN — LORAZEPAM 0.5 MG: 0.5 TABLET ORAL at 12:49

## 2020-07-19 RX ADMIN — METOCLOPRAMIDE HYDROCHLORIDE 5 MG: 5 TABLET ORAL at 12:26

## 2020-07-19 RX ADMIN — INSULIN GLARGINE 10 UNITS: 100 INJECTION, SOLUTION SUBCUTANEOUS at 22:00

## 2020-07-19 RX ADMIN — SODIUM CHLORIDE, PRESERVATIVE FREE 10 ML: 5 INJECTION INTRAVENOUS at 08:44

## 2020-07-19 RX ADMIN — METOCLOPRAMIDE HYDROCHLORIDE 5 MG: 5 TABLET ORAL at 08:44

## 2020-07-19 RX ADMIN — SODIUM CHLORIDE: 9 INJECTION, SOLUTION INTRAVENOUS at 22:49

## 2020-07-19 RX ADMIN — INSULIN LISPRO 3 UNITS: 100 INJECTION, SOLUTION INTRAVENOUS; SUBCUTANEOUS at 22:09

## 2020-07-19 RX ADMIN — SODIUM CHLORIDE, PRESERVATIVE FREE 10 ML: 5 INJECTION INTRAVENOUS at 20:28

## 2020-07-19 RX ADMIN — SODIUM CHLORIDE: 9 INJECTION, SOLUTION INTRAVENOUS at 04:34

## 2020-07-19 RX ADMIN — ATORVASTATIN CALCIUM 40 MG: 40 TABLET, FILM COATED ORAL at 20:28

## 2020-07-19 RX ADMIN — DILTIAZEM HYDROCHLORIDE 240 MG: 240 CAPSULE, EXTENDED RELEASE ORAL at 10:44

## 2020-07-19 RX ADMIN — CEFTRIAXONE SODIUM 1 G: 1 INJECTION, POWDER, FOR SOLUTION INTRAMUSCULAR; INTRAVENOUS at 04:27

## 2020-07-19 RX ADMIN — LEVOTHYROXINE SODIUM 50 MCG: 0.05 TABLET ORAL at 06:33

## 2020-07-19 RX ADMIN — LINEZOLID 600 MG: 600 TABLET, FILM COATED ORAL at 08:44

## 2020-07-19 RX ADMIN — INSULIN LISPRO 1 UNITS: 100 INJECTION, SOLUTION INTRAVENOUS; SUBCUTANEOUS at 12:47

## 2020-07-19 RX ADMIN — SODIUM CHLORIDE, PRESERVATIVE FREE 10 ML: 5 INJECTION INTRAVENOUS at 08:45

## 2020-07-19 ASSESSMENT — PAIN SCALES - GENERAL
PAINLEVEL_OUTOF10: 0

## 2020-07-19 NOTE — CONSULTS
[address] [place] [address] [facility]  Consultation Note     Admit Date: 7/16/2020  8:12 PM    Reason for Consult:   VRE UTI    Attending Physician:  Theodore Gomes MD    HISTORY OF PRESENT ILLNESS:             The history is obtained from extensive review of available past medical records. The patient is a 31 y.o. female who is known to the ID service. The patient has a history of ESRD and is currently on peritoneal dialysis. She was admitted to the hospital on 7/16/2020 with abdominal pain and intractable nausea and vomiting. She was treated with Ceftriaxone and received a single dose of 2 g of Metronidazole for trichomonas. The nausea and vomiting have resolved. She still has some abdominal discomfort which is not new. He has chronic diarrhea which she has had for years which is described as watery, usually after she eats. She has not had any fevers. She denies having any burning, urgency or frequency on urination. She has been afebrile. Urine culture grew VRE. She was treated with Linezolid. Peritoneal dialysis was resumed. Urine cultures growing VRE. Linezolid was started. ID was asked to see her. Past Medical History:        Diagnosis Date    Acute congestive heart failure ([address]     BC (acute kidney injury) ([facility] 10/1/2019    Cephalgia 10/9/2019    Chronic kidney disease     Depression     Diabetes mellitus ([address]     Diabetic ketoacidosis ([address] 8/27/2011    Hemodialysis patient ([facility]     History of blood transfusion 11/2019    Iron deficiency anemia 10/1/2019    MDRO (multiple drug resistant organisms) resistance     MRSA (methicillin resistant Staphylococcus aureus)     back wound abcess    Non compliance w medication regimen 3/30/2016    Other disorders of kidney and ureter     Pregnancy 3/30/2016    16 weeks    Severe pre-eclampsia in third trimester 8/22/2016 February 2020. Admitted to [facility] [place] [facility] with loss of consciousness.   Seen by   levothyroxine  50 mcg Oral Daily    gentamicin   Topical Daily    bumetanide  1 mg Oral Daily    atorvastatin  40 mg Oral Nightly    metoprolol  100 mg Oral BID    dilTIAZem  240 mg Oral Daily    pantoprazole  40 mg Intravenous Daily    And    sodium chloride (PF)  10 mL Intravenous Daily    sodium chloride flush  10 mL Intravenous 2 times per day    nicotine  1 patch Transdermal Daily    heparin (porcine)  5,000 Units Subcutaneous Q8H    insulin glargine  10 Units Subcutaneous BID    insulin lispro  0-6 Units Subcutaneous TID WC    insulin lispro  0-3 Units Subcutaneous Nightly     Continuous Infusions:   dextrose      sodium chloride 60 mL/hr at 07/19/20 0434     PRN Meds:LORazepam, sodium chloride flush, acetaminophen **OR** acetaminophen, polyethylene glycol, ondansetron **OR** ondansetron, glucose, dextrose, glucagon (rDNA), dextrose, trimethobenzamide    Allergies:   Toradol [ketorolac tromethamine]    Social History:   Social History     Socioeconomic History    Marital status: Single     Spouse name: None    Number of children: None    Years of education: None    Highest education level: None   Occupational History    None   Social Needs    Financial resource strain: None    Food insecurity     Worry: None     Inability: None    Transportation needs     Medical: None     Non-medical: None   Tobacco Use    Smoking status: Current Every Day Smoker     Packs/day: 0.50     Years: 6.00     Pack years: 3.00     Types: Cigarettes    Smokeless tobacco: Current User   Substance and Sexual Activity    Alcohol use: No    Drug use: No     Comment: documented prior history of opioid abuse    Sexual activity: Yes     Partners: Male   Lifestyle    Physical activity     Days per week: None     Minutes per session: None    Stress: None   Relationships    Social connections     Talks on phone: None     Gets together: None     Attends Anabaptism service: None     Active member of club or organization: None     Attends meetings of clubs or organizations: None     Relationship status: None    Intimate partner violence     Fear of current or ex partner: None     Emotionally abused: None     Physically abused: None     Forced sexual activity: None   Other Topics Concern    None   Social History Narrative    None      Pets: Dog  Travel: No  Patient lives with her mother. She is unemployed    Family History:       Problem Relation Age of Onset    Asthma Mother     Hypertension Mother     High Blood Pressure Mother     Diabetes Mother     Asthma Brother     High Blood Pressure Father    . Otherwise non-pertinent to the chief complaint. REVIEW OF SYSTEMS:    Constitutional: Negative for fevers, chills, diaphoresis  Neurologic: Negative   Psychiatric: Negative  Rheumatologic: Negative   Endocrine: Negative  Hematologic: Negative  Immunologic: Negative  ENT: Negative  Respiratory: Negative   Cardiovascular: Negative  GI: As in the HPI  : Negative  Musculoskeletal: Negative  Skin: No rashes. PHYSICAL EXAM:    Vitals:   /78   Pulse 77   Temp 97.8 °F (36.6 °C) (Temporal)   Resp 16   Wt 132 lb 15 oz (60.3 kg)   LMP 07/01/2020 (Approximate)   SpO2 96%   BMI 23.55 kg/m²   Constitutional: The patient is awake, alert, and oriented. Lying in bed. No distress. Apathetic but cooperative. Makes poor eye contact. Skin: Warm and dry. No rashes were noted. HEENT: Eyes show round, and reactive pupils. No jaundice. Moist mucous membranes, no ulcerations, no thrush. Neck: Supple to movements. No lymphadenopathy. Chest: No use of accessory muscles to breathe. Symmetrical expansion. Auscultation reveals no wheezing, crackles, or rhonchi. Cardiovascular: S1 and S2 are rhythmic and regular. No murmurs appreciated. Abdomen: Positive bowel sounds to auscultation. Benign to palpation. No masses felt. No hepatosplenomegaly. PD catheter.   Extremities: No clubbing, no cyanosis, no edema. Lines: Mediport accessed.       CBC+dif:  Recent Labs     07/16/20 2031 07/18/20  0510 07/19/20  0435   WBC 11.2 7.3 5.1   HGB 11.1* 10.0* 9.1*   HCT 34.9 31.8* 28.9*   MCV 97.5 98.5 99.0    242 222   NEUTROABS 8.55* 4.65 2.58     Lab Results   Component Value Date    CRP 43.1 (H) 11/01/2019    CRP 0.3 10/02/2019    CRP 0.4 09/28/2017      No results found for: CRP  Lab Results   Component Value Date    SEDRATE 135 (H) 11/01/2019    SEDRATE 14 09/28/2017     Lab Results   Component Value Date    ALT 7 07/18/2020    AST 11 07/18/2020    ALKPHOS 133 (H) 07/18/2020    BILITOT <0.2 07/18/2020     Lab Results   Component Value Date     07/19/2020    K 3.5 07/19/2020    K 4.1 07/16/2020     07/19/2020    CO2 22 07/19/2020    BUN 19 07/19/2020    CREATININE 4.4 07/19/2020    GFRAA 14 07/19/2020    LABGLOM 14 07/19/2020    GLUCOSE 122 07/19/2020    GLUCOSE 130 05/18/2012    PROT 4.9 07/18/2020    LABALBU 2.5 07/18/2020    LABALBU 4.1 05/18/2012    CALCIUM 7.6 07/19/2020    BILITOT <0.2 07/18/2020    ALKPHOS 133 07/18/2020    AST 11 07/18/2020    ALT 7 07/18/2020       Lab Results   Component Value Date    PROTIME 11.3 02/12/2020    PROTIME 13.6 08/14/2011    INR 1.0 02/12/2020       Lab Results   Component Value Date    TSH 12.140 07/18/2020       Lab Results   Component Value Date    COLORU Yellow 07/17/2020    PHUR 6.0 07/17/2020    LABCAST RARE 01/12/2020    WBCUA 5-10 07/17/2020    WBCUA 0-1 05/18/2012    RBCUA 10-20 07/17/2020    RBCUA 1-3 08/01/2013    MUCUS Present 02/12/2016    TRICHOMONAS Present 07/17/2020    YEAST RARE 05/24/2018    BACTERIA FEW 07/17/2020    CLARITYU Clear 07/17/2020    SPECGRAV 1.020 07/17/2020    LEUKOCYTESUR SMALL 07/17/2020    UROBILINOGEN 0.2 07/17/2020    BILIRUBINUR Negative 07/17/2020    BILIRUBINUR SMALL 05/18/2012    BLOODU MODERATE 07/17/2020    GLUCOSEU >=1000 07/17/2020    GLUCOSEU >=1000 05/18/2012    AMORPHOUS RARE 11/01/2019       Lab Results Component Value Date    HCO3 24.5 02/04/2020    BE -0.9 02/04/2020    O2SAT 97.6 02/04/2020    PH 7.366 02/04/2020    PH 7.290 09/08/2012    PCO2 43.7 02/04/2020    PO2 117.1 02/04/2020     Radiology:  Noted    Microbiology:  Pending  No results for input(s): BC in the last 72 hours. Recent Labs     07/17/20  1156   ORG Enterococcus faecium*     No results for input(s): Charl Delay in the last 72 hours. No results for input(s): STREPNEUMAGU in the last 72 hours. No results for input(s): LP1UAG in the last 72 hours. No results for input(s): ASO in the last 72 hours. No results for input(s): CULTRESP in the last 72 hours. No results for input(s): PROCAL in the last 72 hours. Assessment:  · Diabetic gastroparesis causing nausea and vomiting, resolved  · Asymptomatic bacteriuria with Enterococcus faecium  · Chronic diarrhea  · Trichomoniasis, treated with Metronidazole    Plan:    · No need to treat asymptomatic bacteriuria  · Check cultures, baseline ESR, CRP  · Monitor labs  · Will follow with you    Thank you for having us see this patient in consultation. I will be discussing this case with the treating physicians.     Omega Khan  9:52 AM  7/19/2020

## 2020-07-19 NOTE — FLOWSHEET NOTE
CCPD initiated using aseptic technique. Dressing changed.  No complications draining or filling noted

## 2020-07-19 NOTE — PROGRESS NOTES
surrounding erythema or warmth. no CVA tenderness. · Extremities:  no edema  · Musculokeletal: No joint swelling, no muscle tenderness. ROM normal in all joints of extremities.    · Neurologic: Mental status: Alert, oriented, thought content appropriate  Subject  Pertinent Labs & Imaging Studies   jorge alberto  CBC with Differential:    Lab Results   Component Value Date    WBC 5.1 07/19/2020    RBC 2.92 07/19/2020    HGB 9.1 07/19/2020    HCT 28.9 07/19/2020     07/19/2020    MCV 99.0 07/19/2020    MCH 31.2 07/19/2020    MCHC 31.5 07/19/2020    RDW 14.3 07/19/2020    SEGSPCT 67 06/27/2013    LYMPHOPCT 36.7 07/19/2020    MONOPCT 7.1 07/19/2020    BASOPCT 1.4 07/19/2020    MONOSABS 0.36 07/19/2020    LYMPHSABS 1.86 07/19/2020    EOSABS 0.18 07/19/2020    BASOSABS 0.07 07/19/2020     CMP:    Lab Results   Component Value Date     07/19/2020    K 3.5 07/19/2020    K 4.1 07/16/2020     07/19/2020    CO2 22 07/19/2020    BUN 19 07/19/2020    CREATININE 4.4 07/19/2020    GFRAA 14 07/19/2020    LABGLOM 14 07/19/2020    GLUCOSE 122 07/19/2020    GLUCOSE 130 05/18/2012    PROT 4.9 07/18/2020    LABALBU 2.5 07/18/2020    LABALBU 4.1 05/18/2012    CALCIUM 7.6 07/19/2020    BILITOT <0.2 07/18/2020    ALKPHOS 133 07/18/2020    AST 11 07/18/2020    ALT 7 07/18/2020     Hepatic Function Panel:    Lab Results   Component Value Date    ALKPHOS 133 07/18/2020    ALT 7 07/18/2020    AST 11 07/18/2020    PROT 4.9 07/18/2020    BILITOT <0.2 07/18/2020    BILIDIR <0.2 07/18/2020    IBILI see below 07/18/2020    LABALBU 2.5 07/18/2020    LABALBU 4.1 05/18/2012     PT/INR:    Lab Results   Component Value Date    PROTIME 11.3 02/12/2020    PROTIME 13.6 08/14/2011    INR 1.0 02/12/2020     PTT:    Lab Results   Component Value Date    APTT 29.9 02/04/2020   [APTT}  Last 3 Troponin:    Lab Results   Component Value Date    TROPONINI 0.06 07/16/2020    TROPONINI 0.06 07/14/2020    TROPONINI 0.01 06/02/2020     U/A:    Lab Results Component Value Date    COLORU Yellow 07/17/2020    PROTEINU >=300 07/17/2020    PHUR 6.0 07/17/2020    LABCAST RARE 01/12/2020    WBCUA 5-10 07/17/2020    WBCUA 0-1 05/18/2012    RBCUA 10-20 07/17/2020    RBCUA 1-3 08/01/2013    MUCUS Present 02/12/2016    TRICHOMONAS Present 07/17/2020    YEAST RARE 05/24/2018    BACTERIA FEW 07/17/2020    CLARITYU Clear 07/17/2020    SPECGRAV 1.020 07/17/2020    LEUKOCYTESUR SMALL 07/17/2020    UROBILINOGEN 0.2 07/17/2020    BILIRUBINUR Negative 07/17/2020    BILIRUBINUR SMALL 05/18/2012    BLOODU MODERATE 07/17/2020    GLUCOSEU >=1000 07/17/2020    GLUCOSEU >=1000 05/18/2012    AMORPHOUS RARE 11/01/2019     HgBA1c:    Lab Results   Component Value Date    LABA1C 8.8 01/23/2020     TSH:    Lab Results   Component Value Date    TSH 12.140 07/18/2020     Urine Toxicology:  No components found for: Shyrl Becky, ICOCAINE, IMARTHC, IOPIATES, IPHENCYC    07/16:  Beta-hydroxybutyrate: 0.70 mmol/L    7/17:  Urine drug screen: oxycodone positive. Otherwise negative. Urine HCG pregnancy test: negative    7/18:  Cortisol: 5.18 mcg/dL  TSH: 12.140 uIU/mL    US RETROPERITONEAL COMPLETE (07/17)  Impression:   Increased echogenicity of the kidneys, this can be seen with medical renal disease, otherwise unremarkable study. XR CHEST STANDARD (2 VW) (07/16)  Findings: There is a central venous catheter noted the tip is in the right atrium. there is a MediPort also present with its tip in the superior vena cava. The heart is unremarkable   The lung fields demonstrate no significant pulmonary vascular congestion and edema. The aorta is unremarkable   There are no acute infiltrates seen throughout the lung fields.    Chronic changes are noted   Impression:  No acute infiltrate    Resident's Assessment and Plan     Yash [facility] is a 31 y.o. female with PMHx of DM type 1, HTN, CKD stage 5 on PD, gastroparesis, nephrolithiasis, presented with intractable vomiting, abd pain, fatigue, and SOB. 1. Abdominal pain of unclear etiology    - PID?-likely not, new onset, continuous pain. , associated diarrhea, R/o microscopic colitis . - associated with intractable nausea and vomiting initially. - PE found abd soft and non-distend. lower abdominal and suprapubic tenderness. slight rebound on umbilical and lower abdominal and suprapubic region. no guarding. Peritoneal catheter in place in LLQ. no surrounding erythema or warmth. no CVA tenderness.   - WBC 7.4 (7/14) -> 11.2 (7/16) -> 7.3 (7/18)   - afebrile (temperature 96.6)   - US retroperitoneal showing Increased echogenicity of the kidneys, this can be seen with medical renal disease, otherwise unremarkable study. - Urine HCG pregnancy test: negative   - Abdomen pelvis CT- No acute intraabdominal or intrapelvic disease process is identified   - add IV steroids   - daily CBC    2. Intractable nausea and vomiting 2/2 progressive gastroparesis vs viral gastroenteritis    - patient had some loss of appetite at presentation. It has been improved since then. Patient hah dinner yesterday and breakfast today. - nausea improved and no vomiting   - Hx of DM type 1 for    - US retroperitoneal showing Increased echogenicity of the kidneys, this can be seen with medical renal disease, otherwise unremarkable study. - Urine HCG pregnancy test: negative   - continue IVF, NS 60 mL/hr while not tolerating PO per nephrology   - check cortisol (5.18 mcg/dL), TSH (12.140 uIU/mL)   - continue Reglan 5 mg PO, tid    3. Trichomonasis    - UA trichomonas present   - Metronidazole 2,000 mg x 1   - follow UA    4. UTI, (vs likely asymptomatic bacteruria)   - urine cx (2/8/20) grew Klebsiella that was resistant to ampicillin and nitrofurantoin. UA with moderate blood, neg nitrites, neg stacey esterase, > 20 WBC, many bacteria. No fever, WBC casts, or CVA tenderness make pyelonephritis less likely. - Culture growing VRE - E.  Feacsium (consider contamination) Started on Linezolid, ID consulted     5. CKD stage 5   - [facility] renal Bx (10/24/2019): Path showed results consistent with diabetic nephropathy. GFR 12. Was getting HD, started PD 1 week ago. - continue Bumex 1 mg PO daily   - C/S nephrology: CCPD (2 liters exchanges of 1.5% x 4 and long dwell of 2.5% 2 liters) started last night. Patient tolerated PD well. - monitor BMP    6. DM type 1 - poorly controlled. - HgA1C 10-13 in the last several years. Last HgA1C 8.8 on 1/23/20   - blood glucose 666 yesterday AM. Given 12 U Lispro and 10 U Lantus, continue SS. Blood glucose trend down to 219 this AM.   - Noted to have some anion Gap this AM, follow Beta-hydroxy - may have starvation ketosis. - monitor BG.    7. HTN    - metoprolol tartrate 100 mg BID, cardizem  mg daily    8.  HLD   - Atorvastatin 40 mg nightly    PT/OT evaluation: not needed  DVT prophylaxis/ GI prophylaxis: heparin / pantoprazole  Disposition: continue current care    Jose Maria Vargas MD, PGY-1  Attending physician: Dr. Delfino Marquez

## 2020-07-19 NOTE — PROGRESS NOTES
Department of Internal Medicine  Nephrology Attending Consult Note      SUBJECTIVE:  We are following [address] [place] [address] for ESRD on PD. Reports having diarrhea and abdominal pain. Reports no vomiting but still with nausea. PHYSICAL EXAM:      Vitals:    VITALS:  /78   Pulse 77   Temp 97.8 °F (36.6 °C) (Temporal)   Resp 16   Wt 132 lb 15 oz (60.3 kg)   LMP 07/01/2020 (Approximate)   SpO2 96%   BMI 23.55 kg/m²   24HR INTAKE/OUTPUT:      Intake/Output Summary (Last 24 hours) at 7/19/2020 6489  Last data filed at 7/19/2020 0800  Gross per 24 hour   Intake 1340 ml   Output 900 ml   Net 440 ml       Constitutional:  Alert and oriented, in no distress  HEENT:  Normocephalic, PERRL  Respiratory:  CTA bilaterally  Cardiovascular/Edema:  RRR, S1/S2  Gastrointestinal:  Soft, + tenderness, PD catheter exit site clean   Neurologic:  Nonfocal, AVELAR  Skin:  Warm, dry, no lesions  Other:  Right IJ tunneled dialysis catheter in place, L chest mediport accessed  No edema    Scheduled Meds:   linezolid  600 mg Oral 2 times per day    metoclopramide  5 mg Oral TID WC    levothyroxine  50 mcg Oral Daily    gentamicin   Topical Daily    bumetanide  1 mg Oral Daily    atorvastatin  40 mg Oral Nightly    metoprolol  100 mg Oral BID    dilTIAZem  240 mg Oral Daily    pantoprazole  40 mg Intravenous Daily    And    sodium chloride (PF)  10 mL Intravenous Daily    sodium chloride flush  10 mL Intravenous 2 times per day    nicotine  1 patch Transdermal Daily    heparin (porcine)  5,000 Units Subcutaneous Q8H    insulin glargine  10 Units Subcutaneous BID    insulin lispro  0-6 Units Subcutaneous TID WC    insulin lispro  0-3 Units Subcutaneous Nightly     Continuous Infusions:   dextrose      sodium chloride 60 mL/hr at 07/19/20 0434     PRN Meds:. LORazepam, sodium chloride flush, acetaminophen **OR** acetaminophen, polyethylene glycol, ondansetron **OR** ondansetron, glucose, dextrose, glucagon (rDNA), dextrose, trimethobenzamide    DATA:    CBC with Differential:    Lab Results   Component Value Date    WBC 5.1 07/19/2020    RBC 2.92 07/19/2020    HGB 9.1 07/19/2020    HCT 28.9 07/19/2020     07/19/2020    MCV 99.0 07/19/2020    MCH 31.2 07/19/2020    MCHC 31.5 07/19/2020    RDW 14.3 07/19/2020    SEGSPCT 67 06/27/2013    LYMPHOPCT 36.7 07/19/2020    MONOPCT 7.1 07/19/2020    BASOPCT 1.4 07/19/2020    MONOSABS 0.36 07/19/2020    LYMPHSABS 1.86 07/19/2020    EOSABS 0.18 07/19/2020    BASOSABS 0.07 07/19/2020     CMP:    Lab Results   Component Value Date     07/19/2020    K 3.5 07/19/2020    K 4.1 07/16/2020     07/19/2020    CO2 22 07/19/2020    BUN 19 07/19/2020    CREATININE 4.4 07/19/2020    GFRAA 14 07/19/2020    LABGLOM 14 07/19/2020    GLUCOSE 122 07/19/2020    GLUCOSE 130 05/18/2012    PROT 4.9 07/18/2020    LABALBU 2.5 07/18/2020    LABALBU 4.1 05/18/2012    CALCIUM 7.6 07/19/2020    BILITOT <0.2 07/18/2020    ALKPHOS 133 07/18/2020    AST 11 07/18/2020    ALT 7 07/18/2020     Magnesium:    Lab Results   Component Value Date    MG 1.6 07/19/2020     Phosphorus:    Lab Results   Component Value Date    PHOS 4.4 07/19/2020     Radiology Review:      CXR 7/16/2020   No acute infiltrate           Kidney ultrasound July 17, 2020   Increased echogenicity of the kidneys, this can be seen    with medical renal disease, otherwise unremarkable study.             BRIEF SUMMARY OF INITIAL CONSULT:    Briefly, Yolis Escalera is a 31year-old female with history of ESRD on RRT (transitioned from intermittent hemodialysis to PD 1 week ago), with severe proteinuria, poorly controlled type I DM with multiple admissions for DKA, gastroparesis, HTN and recently diagnosed with UTI on 7/14 and treated with cipro, who presented to the ER on 7/16/2020 with complaints of nausea and vomiting x 2 weeks.  She reported that she has been unable to keep anything down for the last few days and that her blood glucose had been anywhere from  mg/dL. She complains as well of abdominal pain to bilateral lower quadrants which radiates to her right flank. Labs obtained in ER were significant for BHB 0.70, glucose 666, BUN 21, Cr 5.3 and BNP 4,696. A CXR showed no acute findings. UA showed >1000 glucose with trace ketones, moderate blood, small leukocytes and microscopy showed 5-10 WBC. She was admitted for futher treatment and evaluation. We are consulted for management of dialysis. She is s/p PD catheter placement on 6/26/2020. PD was one week ago and she denies any issues with it at home. Last PD treatment per patient was the evening of 7/15/2020. Problems resolved:    · Hyponatremia, translocational in the setting of hyperglycemia    IMPRESSION/RECOMMENDATIONS:      1. ESRD on RRT, with severe proteinuria, nephrotic range, due to diabetic nephropathy,  recently transitioned from IHD to PD. RIJ tunneled dialysis catheter remains in place. Having CCPD, PD fluid clear this morning. 2. Type I DM, poorly controlled, glucose on admission 666; glucose levels improved. 3. Intractable nausea and vomiting 2/2 diabetic gastroparesis, improved with reglan, IVF and zofran, improved   4. Abdominal pain and diarrhea, to have CT scan of the abdomen, will obtain PD cell count to rule out PD associated peritonitis  5. Chronic diarrhea, diabetic enteropathy, small intestinal bacterial overgrowth?, celiac sprue?, microscopic colitis? 6. UTI, on ceftriaxone, urine culture pending,   2. HTN, on metoprolol and diltiazem  3. HLD, on atorvastatin  4. Hypoalbuminemia 2/2 poor nutrition  5.  Anemia of CKD, Hgb 11.1, ok to hold MARIA TERESA     Plan:    · Continue CCPD 2 liters exchanges of 1.5% x 4 and long dwell of 2.5% 2 liters,  · GI consult  · Continue normal saline 60 mL/hr   · Obtain PD cell count and culture  · Continue to monitor labs      Electronically signed by Shane Sotelo MD on 7/19/2020 at 9:37 AM

## 2020-07-19 NOTE — PROGRESS NOTES
[facility]    Attending Physician Statement:  Iwona Thomas M.D., F.A.C.P. I have discussed the case, including pertinent history and exam findings with the resident/NP. I have seen and examined the patient and the key elements of the encounter have been performed by me. I agree with the resident ROS, PMHx, PSHx, meds reviewed and assessment, plan and orders as documented by the resident/NP      Hospital charts reviewed, including other providers notes, relevant labs and imaging.        painful defecation - trial B and O suppository  +/- pyridium  +diarrhea  Rule out autonomic instability-- colitis- microscopic   +/- celiac sprue  Rule out bacterial overgrowth  hydrocortizone anema vs steroid in persistent. - doubt PID etc.  Severe suprapubic abd pain- treated UTI and trichomonas  Ongoing but now improved N/V    ESRD   CCPD (2 liters exchanges of 1.5% x 4 and long dwell of 2.5% 2 liters) started last night. Patient tolerated PD well  - doubt peritonitis-- ongoing issues since peritoneal dialysis catheter placed  CT abd non revealing     UTI + trich - treating for both, work up STDs otherwise  dm1 uncontrolled  Autonomic neuropathy - gastroparesis suspected and treated   >50% of time spent coordinating care with other providers and/or counseling patient/family  Remainder of medical problems as per resident note.

## 2020-07-19 NOTE — CARE COORDINATION
Went to see the patient this PM regarding refusal of covid-19 test. Explained to patient that it was warranted due to her diarrhea and that even if she had a test previously, it does not necessitate that she did not contract an infection in the time between. Patient expressed that she still did not feel that it was necessary and that it was \"not going to help anyway. \" I addressed all questions and concerns however patient still refused covid-19 test.    Mandie Jung MD, PGY-1

## 2020-07-19 NOTE — FLOWSHEET NOTE
07/19/20 0800   Peritoneal Dialysis Catheter Tenckhoff    Placement Date: 06/26/20   Catheter Type: Tenckhoff  Catheter Location: (c)    Status Deaccessed   Site Condition No Complications   Dressing Status Clean;Dry; Intact   Dressing Gauze   Cycler   Verification of Prescription CCPD   Total Volume Programmed 1000 mL   Last Fill Volume 2000 mL   Ultrafiltration (UF) (mL) 800 mL   Lost Dwell Time (Hours:Minutes) 1;46

## 2020-07-20 LAB
ALBUMIN SERPL-MCNC: 2.9 G/DL (ref 3.5–5.2)
ALP BLD-CCNC: 140 U/L (ref 35–104)
ALT SERPL-CCNC: 14 U/L (ref 0–32)
ANION GAP SERPL CALCULATED.3IONS-SCNC: 12 MMOL/L (ref 7–16)
ANION GAP SERPL CALCULATED.3IONS-SCNC: 16 MMOL/L (ref 7–16)
AST SERPL-CCNC: 24 U/L (ref 0–31)
BASOPHILS ABSOLUTE: 0.03 E9/L (ref 0–0.2)
BASOPHILS RELATIVE PERCENT: 0.3 % (ref 0–2)
BETA-HYDROXYBUTYRATE: 0.08 MMOL/L (ref 0.02–0.27)
BILIRUB SERPL-MCNC: <0.2 MG/DL (ref 0–1.2)
BILIRUBIN DIRECT: <0.2 MG/DL (ref 0–0.3)
BILIRUBIN, INDIRECT: ABNORMAL MG/DL (ref 0–1)
BUN BLDV-MCNC: 29 MG/DL (ref 6–20)
BUN BLDV-MCNC: 33 MG/DL (ref 6–20)
CALCIUM SERPL-MCNC: 7.8 MG/DL (ref 8.6–10.2)
CALCIUM SERPL-MCNC: 8.1 MG/DL (ref 8.6–10.2)
CHLORIDE BLD-SCNC: 103 MMOL/L (ref 98–107)
CHLORIDE BLD-SCNC: 103 MMOL/L (ref 98–107)
CO2: 20 MMOL/L (ref 22–29)
CO2: 22 MMOL/L (ref 22–29)
CREAT SERPL-MCNC: 5.8 MG/DL (ref 0.5–1)
CREAT SERPL-MCNC: 6.5 MG/DL (ref 0.5–1)
EOSINOPHILS ABSOLUTE: 0 E9/L (ref 0.05–0.5)
EOSINOPHILS RELATIVE PERCENT: 0 % (ref 0–6)
GFR AFRICAN AMERICAN: 10
GFR AFRICAN AMERICAN: 9
GFR NON-AFRICAN AMERICAN: 10 ML/MIN/1.73
GFR NON-AFRICAN AMERICAN: 9 ML/MIN/1.73
GLUCOSE BLD-MCNC: 351 MG/DL (ref 74–99)
GLUCOSE BLD-MCNC: 97 MG/DL (ref 74–99)
HCT VFR BLD CALC: 31.8 % (ref 34–48)
HEMOGLOBIN: 10 G/DL (ref 11.5–15.5)
IMMATURE GRANULOCYTES #: 0.09 E9/L
IMMATURE GRANULOCYTES %: 0.9 % (ref 0–5)
LACTIC ACID: 1.9 MMOL/L (ref 0.5–2.2)
LACTIC ACID: 2.9 MMOL/L (ref 0.5–2.2)
LYMPHOCYTES ABSOLUTE: 0.89 E9/L (ref 1.5–4)
LYMPHOCYTES RELATIVE PERCENT: 9.3 % (ref 20–42)
MAGNESIUM: 1.6 MG/DL (ref 1.6–2.6)
MCH RBC QN AUTO: 30.6 PG (ref 26–35)
MCHC RBC AUTO-ENTMCNC: 31.4 % (ref 32–34.5)
MCV RBC AUTO: 97.2 FL (ref 80–99.9)
METER GLUCOSE: 116 MG/DL (ref 74–99)
METER GLUCOSE: 222 MG/DL (ref 74–99)
METER GLUCOSE: 253 MG/DL (ref 74–99)
METER GLUCOSE: 323 MG/DL (ref 74–99)
METER GLUCOSE: 346 MG/DL (ref 74–99)
MONOCYTES ABSOLUTE: 0.13 E9/L (ref 0.1–0.95)
MONOCYTES RELATIVE PERCENT: 1.4 % (ref 2–12)
NEUTROPHILS ABSOLUTE: 8.47 E9/L (ref 1.8–7.3)
NEUTROPHILS RELATIVE PERCENT: 88.1 % (ref 43–80)
PDW BLD-RTO: 14.5 FL (ref 11.5–15)
PHOSPHORUS: 4.9 MG/DL (ref 2.5–4.5)
PLATELET # BLD: 239 E9/L (ref 130–450)
PMV BLD AUTO: 10.5 FL (ref 7–12)
POTASSIUM SERPL-SCNC: 3.8 MMOL/L (ref 3.5–5)
POTASSIUM SERPL-SCNC: 4.1 MMOL/L (ref 3.5–5)
RBC # BLD: 3.27 E12/L (ref 3.5–5.5)
SODIUM BLD-SCNC: 137 MMOL/L (ref 132–146)
SODIUM BLD-SCNC: 139 MMOL/L (ref 132–146)
T3 TOTAL: 36.73 NG/DL (ref 80–200)
T4 FREE: 0.84 NG/DL (ref 0.93–1.7)
TOTAL CK: 23 U/L (ref 20–180)
TOTAL PROTEIN: 5.6 G/DL (ref 6.4–8.3)
WBC # BLD: 9.6 E9/L (ref 4.5–11.5)

## 2020-07-20 PROCEDURE — 80048 BASIC METABOLIC PNL TOTAL CA: CPT

## 2020-07-20 PROCEDURE — 84439 ASSAY OF FREE THYROXINE: CPT

## 2020-07-20 PROCEDURE — 83735 ASSAY OF MAGNESIUM: CPT

## 2020-07-20 PROCEDURE — 90945 DIALYSIS ONE EVALUATION: CPT

## 2020-07-20 PROCEDURE — 85025 COMPLETE CBC W/AUTO DIFF WBC: CPT

## 2020-07-20 PROCEDURE — 2580000003 HC RX 258: Performed by: INTERNAL MEDICINE

## 2020-07-20 PROCEDURE — 6360000002 HC RX W HCPCS: Performed by: INTERNAL MEDICINE

## 2020-07-20 PROCEDURE — 6370000000 HC RX 637 (ALT 250 FOR IP): Performed by: INTERNAL MEDICINE

## 2020-07-20 PROCEDURE — 84100 ASSAY OF PHOSPHORUS: CPT

## 2020-07-20 PROCEDURE — 99231 SBSQ HOSP IP/OBS SF/LOW 25: CPT | Performed by: INTERNAL MEDICINE

## 2020-07-20 PROCEDURE — 82010 KETONE BODYS QUAN: CPT

## 2020-07-20 PROCEDURE — 2580000003 HC RX 258: Performed by: NURSE PRACTITIONER

## 2020-07-20 PROCEDURE — 2500000003 HC RX 250 WO HCPCS: Performed by: NURSE PRACTITIONER

## 2020-07-20 PROCEDURE — 83605 ASSAY OF LACTIC ACID: CPT

## 2020-07-20 PROCEDURE — C9113 INJ PANTOPRAZOLE SODIUM, VIA: HCPCS | Performed by: INTERNAL MEDICINE

## 2020-07-20 PROCEDURE — 36591 DRAW BLOOD OFF VENOUS DEVICE: CPT

## 2020-07-20 PROCEDURE — 84480 ASSAY TRIIODOTHYRONINE (T3): CPT

## 2020-07-20 PROCEDURE — 80076 HEPATIC FUNCTION PANEL: CPT

## 2020-07-20 PROCEDURE — 2060000000 HC ICU INTERMEDIATE R&B

## 2020-07-20 PROCEDURE — 82550 ASSAY OF CK (CPK): CPT

## 2020-07-20 PROCEDURE — 82962 GLUCOSE BLOOD TEST: CPT

## 2020-07-20 PROCEDURE — 36415 COLL VENOUS BLD VENIPUNCTURE: CPT

## 2020-07-20 RX ORDER — MAGNESIUM SULFATE IN WATER 40 MG/ML
2 INJECTION, SOLUTION INTRAVENOUS ONCE
Status: COMPLETED | OUTPATIENT
Start: 2020-07-20 | End: 2020-07-20

## 2020-07-20 RX ORDER — ERGOCALCIFEROL 1.25 MG/1
50000 CAPSULE ORAL ONCE
Status: COMPLETED | OUTPATIENT
Start: 2020-07-20 | End: 2020-07-20

## 2020-07-20 RX ORDER — LORAZEPAM 0.5 MG/1
0.5 TABLET ORAL 3 TIMES DAILY PRN
Status: DISCONTINUED | OUTPATIENT
Start: 2020-07-20 | End: 2020-07-21 | Stop reason: HOSPADM

## 2020-07-20 RX ORDER — PANTOPRAZOLE SODIUM 40 MG/1
40 TABLET, DELAYED RELEASE ORAL
Status: DISCONTINUED | OUTPATIENT
Start: 2020-07-21 | End: 2020-07-21 | Stop reason: HOSPADM

## 2020-07-20 RX ADMIN — INSULIN LISPRO 4 UNITS: 100 INJECTION, SOLUTION INTRAVENOUS; SUBCUTANEOUS at 21:46

## 2020-07-20 RX ADMIN — METOCLOPRAMIDE HYDROCHLORIDE 5 MG: 5 TABLET ORAL at 17:05

## 2020-07-20 RX ADMIN — METOCLOPRAMIDE HYDROCHLORIDE 5 MG: 5 TABLET ORAL at 08:00

## 2020-07-20 RX ADMIN — SODIUM CHLORIDE, PRESERVATIVE FREE 10 ML: 5 INJECTION INTRAVENOUS at 09:30

## 2020-07-20 RX ADMIN — LORAZEPAM 0.5 MG: 0.5 TABLET ORAL at 17:05

## 2020-07-20 RX ADMIN — DILTIAZEM HYDROCHLORIDE 240 MG: 240 CAPSULE, EXTENDED RELEASE ORAL at 09:30

## 2020-07-20 RX ADMIN — MAGNESIUM SULFATE 2 G: 2 INJECTION INTRAVENOUS at 13:54

## 2020-07-20 RX ADMIN — INSULIN LISPRO 8 UNITS: 100 INJECTION, SOLUTION INTRAVENOUS; SUBCUTANEOUS at 09:30

## 2020-07-20 RX ADMIN — ERGOCALCIFEROL 50000 UNITS: 1.25 CAPSULE, LIQUID FILLED ORAL at 12:47

## 2020-07-20 RX ADMIN — SODIUM CHLORIDE, PRESERVATIVE FREE 10 ML: 5 INJECTION INTRAVENOUS at 21:48

## 2020-07-20 RX ADMIN — GENTAMICIN SULFATE: 1 CREAM TOPICAL at 09:28

## 2020-07-20 RX ADMIN — METOPROLOL TARTRATE 100 MG: 50 TABLET, FILM COATED ORAL at 21:47

## 2020-07-20 RX ADMIN — METOPROLOL TARTRATE 100 MG: 50 TABLET, FILM COATED ORAL at 09:29

## 2020-07-20 RX ADMIN — ACETAMINOPHEN 650 MG: 325 TABLET ORAL at 23:24

## 2020-07-20 RX ADMIN — METOCLOPRAMIDE HYDROCHLORIDE 5 MG: 5 TABLET ORAL at 12:47

## 2020-07-20 RX ADMIN — HEPARIN SODIUM 5000 UNITS: 10000 INJECTION, SOLUTION INTRAVENOUS; SUBCUTANEOUS at 21:46

## 2020-07-20 RX ADMIN — PANTOPRAZOLE SODIUM 40 MG: 40 INJECTION, POWDER, FOR SOLUTION INTRAVENOUS at 09:30

## 2020-07-20 RX ADMIN — METHYLPREDNISOLONE SODIUM SUCCINATE 40 MG: 40 INJECTION, POWDER, FOR SOLUTION INTRAMUSCULAR; INTRAVENOUS at 09:28

## 2020-07-20 RX ADMIN — INSULIN LISPRO 6 UNITS: 100 INJECTION, SOLUTION INTRAVENOUS; SUBCUTANEOUS at 14:06

## 2020-07-20 RX ADMIN — INSULIN GLARGINE 10 UNITS: 100 INJECTION, SOLUTION SUBCUTANEOUS at 09:38

## 2020-07-20 RX ADMIN — LEVOTHYROXINE SODIUM 50 MCG: 0.05 TABLET ORAL at 06:05

## 2020-07-20 RX ADMIN — SODIUM BICARBONATE: 84 INJECTION, SOLUTION INTRAVENOUS at 13:18

## 2020-07-20 RX ADMIN — INSULIN GLARGINE 10 UNITS: 100 INJECTION, SOLUTION SUBCUTANEOUS at 21:46

## 2020-07-20 RX ADMIN — ATORVASTATIN CALCIUM 40 MG: 40 TABLET, FILM COATED ORAL at 21:47

## 2020-07-20 RX ADMIN — LORAZEPAM 0.5 MG: 0.5 TABLET ORAL at 09:09

## 2020-07-20 ASSESSMENT — PAIN - FUNCTIONAL ASSESSMENT: PAIN_FUNCTIONAL_ASSESSMENT: PREVENTS OR INTERFERES SOME ACTIVE ACTIVITIES AND ADLS

## 2020-07-20 ASSESSMENT — PAIN DESCRIPTION - LOCATION: LOCATION: BACK

## 2020-07-20 ASSESSMENT — PAIN SCALES - GENERAL
PAINLEVEL_OUTOF10: 0
PAINLEVEL_OUTOF10: 3
PAINLEVEL_OUTOF10: 0
PAINLEVEL_OUTOF10: 0

## 2020-07-20 ASSESSMENT — PAIN DESCRIPTION - ORIENTATION: ORIENTATION: MID

## 2020-07-20 ASSESSMENT — PAIN DESCRIPTION - PROGRESSION: CLINICAL_PROGRESSION: GRADUALLY IMPROVING

## 2020-07-20 ASSESSMENT — PAIN DESCRIPTION - FREQUENCY: FREQUENCY: INTERMITTENT

## 2020-07-20 ASSESSMENT — PAIN DESCRIPTION - DESCRIPTORS: DESCRIPTORS: ACHING;CRAMPING;DISCOMFORT

## 2020-07-20 ASSESSMENT — PAIN DESCRIPTION - ONSET: ONSET: GRADUAL

## 2020-07-20 ASSESSMENT — PAIN DESCRIPTION - PAIN TYPE: TYPE: CHRONIC PAIN

## 2020-07-20 NOTE — PROGRESS NOTES
[facility]  Internal Medicine Residency Program  Progress Note  - House Team 1    Patient:  Jessi Larios 31 y.o. female MRN: [id number]     Date of Service: 7/20/2020     CC: abdominal pain   Overnight events: declined COVID test and did not complain of pain. Subjective     Pt examined this AM. Possible discharge today. She is not complaining of pain today. She seems tired but ready to go home. Neg for nausea, vomiting and dysuria. Neg for blood in stool or urine. Pt denies diarrhea this AM. Pt states that Zofran and Reglan are helping her abdominal symptoms. Pt treated for trichomoniasis on 7/17 Metronidazole 2,000 mg oral 1x dose. Discontinued ceftriaxone on 7/19. No treatment for UTI per ID because the patient is asymptomatic and has \"changed story 5 times\". C. Diff negative. Pt remained afebrile overnight. Objective     Physical Exam:  · Vitals: /74   Pulse 70   Temp 97.3 °F (36.3 °C) (Temporal)   Resp 16   Ht 5' 2.99\" (1.6 m)   Wt 142 lb (64.4 kg)   LMP 07/01/2020 (Approximate)   SpO2 98%   BMI 25.16 kg/m²     · General Appearance: pt sitting up in bed, alert and oriented to space and time. Ready to go home. · Neck: neg for adenopathy, carotid bruit and JVD. Neck is supple, symmetrical and trachea is midline. Neg for thyroid enlargement. Neg for tenderness and nodules. · Lung: clear to auscultation bilaterally. · Heart: regular rate and rhythm, S1, S2 normal, no murmur, click, rub or gallop. · Abdomen: soft, non-tender. Pos for bloating. Neg for masses and organomegaly. · Extremities: extremities normal, atraumatic, neg for cyanosis or edema. · Musculokeletal: no joint swelling, no muscle tenderness. ROM normal in all joints of extremities. · Neurologic: alert and oriented. Student's Assessment and Plan     Jessi Larios is a 31 y.o. female with c/c of abdominal pain. 1. Abdominal pain of unclear etiology.    -WBC (7/20) 9.6   -Neg for nausea, vomiting and diarrhea this AM.    -Abdomen soft, non-tender. Bloating noted. Neg for pain on palpation and neg for rebound tenderness.    -Peritoneal catheter in place in LLQ, neg for surrounding erythema or warmth. Neg for CVA tenderness. -Afebrile.    -Abdomen pelvis CT (7/18) neg for acute intraabdominal or intrapelvic disease process. -R/o COVID for diarrheal symptoms. Pt refuses COVID test.    -C. Diff neg.    2. Intractable nausea and vomiting 2/2 progressive gastroparesis vs viral gastroenteritis.    -Pt has an appetite and was eating breakfast on examination this AM.    -Neg for nausea and vomiting.    -Continue Reglan 5 mg PO as needed.    -Pt on low fiber diet. 3. Trichomoniasis. -UA trichomonas present.   -Treated w/ metronidazole 2,000 mg 1x dose. 4. UTI vs likely asymptomatic bacteruria.   -Neutrophils 88.1%.    -No tx per ID for UTI. Pt has \"changed story 5 times\". -Pt asymptomatic; Culture growing VRE - E. Feacsium (consider contamination). Started on Linezolid, ID consulted. 5.CKD stage 5. -BUN/Cr: 29/5.8.   -Continue Bumex 1 mg PO daily. -Monitor BMP. 6. DM type 1- poorly controlled.   -HgA1C 10-13 in the last several years. Last HgA1C 8.8 on 1/23/20.              -Blood glucose 351 yesterday AM.               -Neg for anion gap this AM.               -Continue monitoring BG.    7. HTN   -Pt receives metoprolol tartrate 100 mg BID, cardizem  mg daily. -BP stable at 123/74. 8. HLD   - Pt receives Atorvastatin 40 mg nightly. PT/OT evaluation: not needed at this time. DVT prophylaxis/ GI prophylaxis: heparin/pantoprazole. Disposition: continue current care.      Leonard Nguyen  MS3  Attending physician: Dr. Wing Tolentino

## 2020-07-20 NOTE — PROGRESS NOTES
[facility]  Internal Medicine Residency Program  Progress Note - House Team 1    Patient:  Lawrence Tamez 31 y.o. female MRN: [id number]     Date of Service: 7/20/2020     CC: had concerns including Emesis (Pt states lack of appetite and emesis x2 weeks. Pt states she feels weak and short of breath upon standing. Pt just started perotoneal dialysis approx 1.5 weeks ago. ). Subjective     No overnight events. Vitals stable. Refused COVID test overnight. No complaints of pain overnight. This morning reported pain is much improved from yesterday. Diarrhea improved she reports only 2 loose bowel movements this morning. She was crying this morning feeling upset because she missed her kids. May sign out AMA. Objective     Physical Exam:  · Vitals: /85   Pulse 80   Temp 97.4 °F (36.3 °C) (Temporal)   Resp 16   Ht 5' 2.99\" (1.6 m)   Wt 141 lb 12.1 oz (64.3 kg)   LMP 07/01/2020 (Approximate)   SpO2 100%   BMI 25.12 kg/m²     · I & O - 24hr: I/O this shift:  · In: 527 [P.O.:360; I.V.:167]  · Out: 651    · General Appearance: alert, appears stated age and cooperative  · HEENT:  Head: Normocephalic, no lesions, without obvious abnormality. · Neck: no adenopathy, no carotid bruit, no JVD, supple, symmetrical, trachea midline and thyroid not enlarged, symmetric, no tenderness/mass/nodules  · Lung: clear to auscultation bilaterally  · Heart: regular rate and rhythm, S1, S2 normal, no murmur, click, rub or gallop  · Abdomen: soft, non-tender; bowel sounds normal; no masses,  no organomegaly  · Extremities:  extremities normal, atraumatic, no cyanosis or edema  · Musculokeletal: No joint swelling, no muscle tenderness. ROM normal in all joints of extremities.    · Neurologic: Mental status: Alert, oriented, thought content appropriate  Subject  Pertinent Labs & Imaging Studies   jorge alberto  CBC with Differential:    Lab Results   Component Value Date    WBC 9.6 07/20/2020    RBC 3.27 07/20/2020    HGB 10.0 07/20/2020    HCT 31.8 07/20/2020     07/20/2020    MCV 97.2 07/20/2020    MCH 30.6 07/20/2020    MCHC 31.4 07/20/2020    RDW 14.5 07/20/2020    SEGSPCT 67 06/27/2013    LYMPHOPCT 9.3 07/20/2020    MONOPCT 1.4 07/20/2020    BASOPCT 0.3 07/20/2020    MONOSABS 0.13 07/20/2020    LYMPHSABS 0.89 07/20/2020    EOSABS 0.00 07/20/2020    BASOSABS 0.03 07/20/2020     BMP:    Lab Results   Component Value Date     07/20/2020    K 4.1 07/20/2020    K 4.1 07/16/2020     07/20/2020    CO2 20 07/20/2020    BUN 29 07/20/2020    LABALBU 2.5 07/18/2020    LABALBU 4.1 05/18/2012    CREATININE 5.8 07/20/2020    CALCIUM 7.8 07/20/2020    GFRAA 10 07/20/2020    LABGLOM 10 07/20/2020    GLUCOSE 351 07/20/2020    GLUCOSE 130 05/18/2012     Hepatic Function Panel:    Lab Results   Component Value Date    ALKPHOS 133 07/18/2020    ALT 7 07/18/2020    AST 11 07/18/2020    PROT 4.9 07/18/2020    BILITOT <0.2 07/18/2020    BILIDIR <0.2 07/18/2020    IBILI see below 07/18/2020    LABALBU 2.5 07/18/2020    LABALBU 4.1 05/18/2012     Magnesium:    Lab Results   Component Value Date    MG 1.6 07/20/2020     Phosphorus:    Lab Results   Component Value Date    PHOS 4.9 07/20/2020     U/A:    Lab Results   Component Value Date    COLORU Yellow 07/17/2020    PROTEINU >=300 07/17/2020    PHUR 6.0 07/17/2020    LABCAST RARE 01/12/2020    WBCUA 5-10 07/17/2020    WBCUA 0-1 05/18/2012    RBCUA 10-20 07/17/2020    RBCUA 1-3 08/01/2013    MUCUS Present 02/12/2016    TRICHOMONAS Present 07/17/2020    YEAST RARE 05/24/2018    BACTERIA FEW 07/17/2020    CLARITYU Clear 07/17/2020    SPECGRAV 1.020 07/17/2020    LEUKOCYTESUR SMALL 07/17/2020    UROBILINOGEN 0.2 07/17/2020    BILIRUBINUR Negative 07/17/2020    BILIRUBINUR SMALL 05/18/2012    BLOODU MODERATE 07/17/2020    GLUCOSEU >=1000 07/17/2020    GLUCOSEU >=1000 05/18/2012    AMORPHOUS RARE 11/01/2019     HgBA1c:    Lab Results   Component Value Date    LABA1C 8.6 INDICATION:  Abdominal Pain; evaluate for kidney stones/hydronephrosis     Abdominal Pain; evaluate for kidney stones/hydronephrosis   COMPARISON: None     The right kidney is 10.1 x 4.1 x 3.9 cm   The left kidney  is 9.7 x 5.1 x 5.6 cm   There is no mass identified   There is no hydronephrosis identified   There is no calculus identified   The bladder is unremarkable   There is increased echogenicity at the level the renal cortex   bilaterally       Impression:         Increased echogenicity of the kidneys, this can be seen   with medical renal disease, otherwise unremarkable study.       US ABDOMEN COMPLETE [[id number]]        Order Status: Canceled        US ABDOMEN COMPLETE [[id number]]        Order Status: Canceled        US ABDOMEN COMPLETE [[id number]]        Order Status: Canceled        XR CHEST STANDARD (2 VW) [[id number]]  Resulted: 20       Order Status: Completed  Specimen: Chest  Updated: 20       Narrative:         Patient MRN: [id number]   : 1990   Age:  30 years   Gender: Female   Order Date: 2020 7:45 PM   Exam: XR CHEST (2 VW)   Number of Images: 2 views   Indication:   sob   sob   Comparison: 2020     Findings: There is a central venous catheter noted the tip is in the right   atrium there is a MediPort also present with its tip in the superior   vena cava   The heart is unremarkable   The lung fields demonstrate no significant pulmonary vascular   congestion and edema. The aorta is unremarkable   There are no acute infiltrates seen throughout the lung fields. Chronic changes are noted         Impression:         No acute infiltrate          Resident's Assessment and Plan     [address] [place] [address] Road is a 31 y.o. female with PMH of DM I (poorly controlled), HTN, CKD stage 5, gastroparesis, nephrolithiasis who presented with intractable vomiting, abdominal pain, fatigue, and SOB      1.  Abdominal pain of unclear origin w/ vomiting, nausea, and diarrhea   - showed no acute intraabdominal abnormality or intrapelvic disease process   - Continue IV fluid 60mL/hr until tolerating PO per nephrology recommendations    -Cortisol 5.18, TSH 12.140   -continue Reglan 5mg PO TID for gastroparesis     3. Trichomoniasis    -Trichomonas present on UA   - one dose metronidazole given   -F/U UA    4. UTI vs asymptomatic bacteruria   - urine culture 2/8/20 grew Klebsiella that was resistant to ampicillin and nitrofurantoin. UA with moderate blood, neg nitrites neg leukocyte esterase WBC > 20 many bacteria. No fever, chills inconsistent history says urination is painful then says it is not painful.   -culture growing VRE Feacsium (contamination possible). Was started on linezolid but was discontinued. - ID consulted recommended no antibiotics for asymptomatic bacteruria. 5. Hyperlactatemia    -lactic acid was 2.6 on now 2.9, may be due to CT contrast toxicity, monitor repeat lactic acid    6. CKD Stage 5    -[facility] renal biopsy on 10/24/19 pathology was consistent with diabetic nephropathy. GFR 12 was getting HD started PD 1 week ago.    -continue Bumex 1mg PO daily   - Follow Nephrology recommendations CCPD 2 liters exchanges 1.5% X 4 and long dwell of 2.5% 2 liters, fluids 1/2 NS + 75 mEq sodium bicarbonate @ 75mL/hr, Retacrit started today 8,000 units TIW, GI consulted   -Monitor BMP    7. DM Type 1 poorly controlled    -HgA1c 10-13 in last couple years. HgA1c was 8.8 on 1/23/20    -Given 12 U Lispro and 10 U Lantus, low dose sliding scale increased to middle dose sliding scale,    -POCT glucose 4/day, monitor BG   -Betahydroxybutrate elevated on admission at  .7 now . 08 likely 2/2 starvation ketosis   -Blood sugar 351 this morning, continue to monitor changed to middle dose sliding scale insulin    8. HTN   -continue metoprolol tartrate 100mg BID, Cardizem, CD 240mg daily     9. HLD   -Atorvastatin 40mg nightly     10.  Vitamin D deficiency    -Vitamin D 50,000 units ordered one dose with daily 2,000 units thereafter    11. Hx of anxiety    -continue home dose of Ativan . 5mg BID    PT/OT evaluation: none  DVT prophylaxis/ GI prophylaxis: heparin/pantoprazole  Disposition: continue current care  Tammi Singh DO, PGY-1  Attending physician: Dr. Kika Huynh

## 2020-07-20 NOTE — CARE COORDINATION
7/20/2020 GI consult to be done. Discharge plan remains home when medically stable. Pt anticipates no needs at discharge. Family will provide transportation home. SW/CM will continue to follow.

## 2020-07-20 NOTE — PROGRESS NOTES
[address] [place] [address] [facility]  THUANLENCHO  Progress Note    SUBJECTIVE:  Chief Complaint   Patient presents with    Emesis     Pt states lack of appetite and emesis x2 weeks. Pt states she feels weak and short of breath upon standing. Pt just started perotoneal dialysis approx 1.5 weeks ago. The patient has no new complaints today. No nausea, no vomiting. Diarrhea has improved  No pain. No fever. Review of systems:  As stated above in the chief complaint, otherwise negative. Medications:  Scheduled Meds:   insulin lispro  0-12 Units Subcutaneous Q4H    [START ON 2020] pantoprazole  40 mg Oral QAM AC    vitamin D  50,000 Units Oral Once    methylPREDNISolone  40 mg Intravenous Daily    metoclopramide  5 mg Oral TID WC    levothyroxine  50 mcg Oral Daily    gentamicin   Topical Daily    atorvastatin  40 mg Oral Nightly    metoprolol  100 mg Oral BID    dilTIAZem  240 mg Oral Daily    sodium chloride flush  10 mL Intravenous 2 times per day    nicotine  1 patch Transdermal Daily    heparin (porcine)  5,000 Units Subcutaneous Q8H    insulin glargine  10 Units Subcutaneous BID     Continuous Infusions:   dextrose      sodium chloride 60 mL/hr at 20 2249     PRN Meds:opium-belladonna, LORazepam, sodium chloride flush, acetaminophen **OR** acetaminophen, polyethylene glycol, ondansetron **OR** ondansetron, glucose, dextrose, glucagon (rDNA), dextrose, trimethobenzamide    OBJECTIVE:  /85   Pulse 80   Temp 97.4 °F (36.3 °C) (Temporal)   Resp 16   Ht 5' 2.99\" (1.6 m)   Wt 141 lb 12.1 oz (64.3 kg)   LMP 2020 (Approximate)   SpO2 100%   BMI 25.12 kg/m²   Temp  Av °F (36.7 °C)  Min: 97.3 °F (36.3 °C)  Max: 99.1 °F (37.3 °C)  Constitutional: The patient is awake, alert, and oriented. Skin: Warm and dry. No rashes were noted. HEENT: Round and reactive pupils. Moist mucous membranes. No ulcerations or thrush. Neck: Supple to movements.    Chest: No use of accessory muscles to breathe. Symmetrical expansion. No wheezing, crackles or rhonchi. Cardiovascular: S1 and S2 are rhythmic and regular. No murmurs appreciated. Abdomen: Positive bowel sounds to auscultation. Benign to palpation. Extremities: No edema. Lines: peripheral    Laboratory and Tests Review:  Lab Results   Component Value Date    WBC 9.6 07/20/2020    WBC 5.1 07/19/2020    WBC 7.3 07/18/2020    HGB 10.0 (L) 07/20/2020    HCT 31.8 (L) 07/20/2020    MCV 97.2 07/20/2020     07/20/2020     Lab Results   Component Value Date    NEUTROABS 8.47 (H) 07/20/2020    NEUTROABS 2.58 07/19/2020    NEUTROABS 4.65 07/18/2020     No results found for: [facility]  Lab Results   Component Value Date    ALT 7 07/18/2020    AST 11 07/18/2020    ALKPHOS 133 (H) 07/18/2020    BILITOT <0.2 07/18/2020     Lab Results   Component Value Date     07/20/2020    K 4.1 07/20/2020    K 4.1 07/16/2020     07/20/2020    CO2 20 07/20/2020    BUN 29 07/20/2020    CREATININE 5.8 07/20/2020    CREATININE 4.4 07/19/2020    CREATININE 4.5 07/18/2020    GFRAA 10 07/20/2020    LABGLOM 10 07/20/2020    GLUCOSE 351 07/20/2020    GLUCOSE 130 05/18/2012    PROT 4.9 07/18/2020    LABALBU 2.5 07/18/2020    LABALBU 4.1 05/18/2012    CALCIUM 7.8 07/20/2020    BILITOT <0.2 07/18/2020    ALKPHOS 133 07/18/2020    AST 11 07/18/2020    ALT 7 07/18/2020     Lab Results   Component Value Date    CRP 43.1 (H) 11/01/2019    CRP 0.3 10/02/2019    CRP 0.4 09/28/2017     Lab Results   Component Value Date    SEDRATE 135 (H) 11/01/2019    SEDRATE 14 09/28/2017     Radiology:      Microbiology:   Urine culture 7/17/2020: 75,000 VRE faecium  Peritoneal fluid 7/18/2020: Negative  C. difficile toxin EIA 7/19/2020: Indeterminate.   PCR: Negative  Blood cultures 7/18/2020: Negative so far    ASSESSMENT:  · Diabetic gastroparesis causing nausea and vomiting, improved  · Asymptomatic bacteriuria with VRE  · Chronic diarrhea  · Trichomoniasis, treated with single dose Metronidazole  · ESRD, on PD    PLAN:  · Antibiotics were not warranted for asymptomatic bacteremia  · The patient can be discharged from [facility] 310  11:38 AM  7/20/2020

## 2020-07-20 NOTE — PROGRESS NOTES
[address] [facility]  Internal Medicine Residency / [address]    Attending Physician Statement  I have discussed the case, including pertinent history and exam findings with the resident and the team.  I have seen and examined the patient and the key elements of the encounter have been performed by me. I agree with the assessment, plan and orders as documented by the resident. Alert and depressed, she misses her kids  VS stable  May not have had UTI, contaminated specimen  Trichomonas treated  No clinical evidence of DKA, betahydroxy 0.08  VS stable  Eating well  And will increase Humalog coverage on Prednisone   Question of COVID diarrhea entertained but she refuses testing  Considering inflammatory bowel as cause of diarrhea  Recommend outpatient COVID testing as outpatient in [place]  Will need colonoscopy later with GI    Remainder of medical problems as per resident note.       Campbell Larkin  Internal Medicine Residency Faculty

## 2020-07-20 NOTE — PROGRESS NOTES
Patient stated to this nurse \"All the doctors said I was being discharged today, If I am not discharged then I may sign out AMA, I want to go home and I miss my kids! \"  Patient refusing lab draw at this time as she is eating lunch.

## 2020-07-20 NOTE — FLOWSHEET NOTE
07/20/20 0730   Vitals   /89   Temp 98 °F (36.7 °C)   Temp Source Temporal   Pulse 76   Resp 16   Weight 141 lb 12.1 oz (64.3 kg)   Peritoneal Dialysis Catheter Tenckhoff    Placement Date: 06/26/20   Catheter Type: Tenckhoff  Catheter Location: (c)    Status Deaccessed   Site Condition No Complications   Dressing Status Clean;Dry; Intact   Dressing Gauze   Cycler   Verification of Prescription CCPD   Total Volume Programmed 74029 mL   Therapy Time (Hours:Minutes) 9   Fill Volume 2000 mL   Last Fill Volume 2000 mL   Dextrose Setting Same (Nonextraneal)   Number of Cycles 5   Bag Volume 5000 mL   Number of Bags Used 3   Dianeal Solution Other (Comment)  (1.5 5000, 2.5 5000)   Ultrafiltration (UF) (mL) 651 mL   CCPD completed using strict aseptic technique, dressing is clean, dry, intact, effluent is pale yellow with no fibrin present

## 2020-07-20 NOTE — PROGRESS NOTES
(rDNA), dextrose, trimethobenzamide    DATA:    CBC with Differential:    Lab Results   Component Value Date    WBC 9.6 07/20/2020    RBC 3.27 07/20/2020    HGB 10.0 07/20/2020    HCT 31.8 07/20/2020     07/20/2020    MCV 97.2 07/20/2020    MCH 30.6 07/20/2020    MCHC 31.4 07/20/2020    RDW 14.5 07/20/2020    SEGSPCT 67 06/27/2013    LYMPHOPCT 9.3 07/20/2020    MONOPCT 1.4 07/20/2020    BASOPCT 0.3 07/20/2020    MONOSABS 0.13 07/20/2020    LYMPHSABS 0.89 07/20/2020    EOSABS 0.00 07/20/2020    BASOSABS 0.03 07/20/2020     CMP:    Lab Results   Component Value Date     07/20/2020    K 4.1 07/20/2020    K 4.1 07/16/2020     07/20/2020    CO2 20 07/20/2020    BUN 29 07/20/2020    CREATININE 5.8 07/20/2020    GFRAA 10 07/20/2020    LABGLOM 10 07/20/2020    GLUCOSE 351 07/20/2020    GLUCOSE 130 05/18/2012    PROT 4.9 07/18/2020    LABALBU 2.5 07/18/2020    LABALBU 4.1 05/18/2012    CALCIUM 7.8 07/20/2020    BILITOT <0.2 07/18/2020    ALKPHOS 133 07/18/2020    AST 11 07/18/2020    ALT 7 07/18/2020     Magnesium:    Lab Results   Component Value Date    MG 1.6 07/20/2020     Phosphorus:    Lab Results   Component Value Date    PHOS 4.9 07/20/2020     Radiology Review:      CXR 7/16/2020   No acute infiltrate           Kidney ultrasound July 17, 2020   Increased echogenicity of the kidneys, this can be seen    with medical renal disease, otherwise unremarkable study.           CT abdomen pelvis 7/18/2020         1. No acute intraabdominal or intrapelvic disease process is    identified.         2. A peritoneal dialysis catheter is noted.  A moderate amount of    ascites is noted.         3. Prior cholecystectomy.                            BRIEF SUMMARY OF INITIAL CONSULT:    Briefly, Youlanda Gaucher is a 31year-old female with history of ESRD on RRT (transitioned from intermittent hemodialysis to PD 1 week ago), with severe proteinuria, poorly controlled type I DM with multiple admissions for DKA, gastroparesis, HTN and recently diagnosed with UTI on 7/14 and treated with cipro, who presented to the ER on 7/16/2020 with complaints of nausea and vomiting x 2 weeks. She reported that she has been unable to keep anything down for the last few days and that her blood glucose had been anywhere from  mg/dL. She complains as well of abdominal pain to bilateral lower quadrants which radiates to her right flank. Labs obtained in ER were significant for BHB 0.70, glucose 666, BUN 21, Cr 5.3 and BNP 4,696. A CXR showed no acute findings. UA showed >1000 glucose with trace ketones, moderate blood, small leukocytes and microscopy showed 5-10 WBC. She was admitted for futher treatment and evaluation. We are consulted for management of dialysis. She is s/p PD catheter placement on 6/26/2020. PD was one week ago and she denies any issues with it at home. Last PD treatment per patient was the evening of 7/15/2020. Problems resolved:    · Hyponatremia, translocational in the setting of hyperglycemia    IMPRESSION/RECOMMENDATIONS:      1. ESRD on RRT, with severe proteinuria, nephrotic range, due to diabetic nephropathy,  recently transitioned from IHD to PD. RIJ tunneled dialysis catheter remains in place. Having CCPD, PD fluid culture with no growth. Cell count within normal limits. No evidence of peritonitis. 2. Low bicarbonate level with rising anion gap, probably HAGMA 2/2 lactic acidosis (? Vomiting and intravascular volume depletion?), linezolid?, sepsis??  3. Type I DM, poorly controlled, glucose on admission 666; glucose levels improved however remain elevated  4. Intractable nausea and vomiting 2/2 diabetic gastroparesis, improved with reglan, IVF and zofran, improved   5. Abdominal pain and diarrhea, CT scan of the abdomen and pelvis with no acute findings, PD associated peritonitis ruled out. GI consult pending.   6. Chronic diarrhea, diabetic enteropathy, small intestinal bacterial overgrowth?, celiac sprue?, microscopic colitis? 7. Asymptomatic bacteriuria, urine growing VRE, no treatment per ID  2. HTN, on metoprolol and diltiazem  3. HLD, on atorvastatin  4. Hypoalbuminemia 2/2 poor nutrition  5.  Anemia of CKD, to start MARIA TERESA     Plan:    · Continue CCPD 2 liters exchanges of 1.5% x 4 and long dwell of 2.5% 2 liters  · Change fluids to 1/2 NS + 75 mEq sodium bicarbonate @ 75 mL/hr  · Start retacrit 8,000 units tiw  · Pending GI consult  · Repeat lactic acid and BMP this afternoon  · Continue to monitor labs      Electronically signed by HEBER Jennings Cap, CNP on 7/20/2020 at 11:37 AM

## 2020-07-21 VITALS
BODY MASS INDEX: 25.12 KG/M2 | WEIGHT: 141.76 LBS | HEART RATE: 92 BPM | SYSTOLIC BLOOD PRESSURE: 136 MMHG | OXYGEN SATURATION: 100 % | DIASTOLIC BLOOD PRESSURE: 79 MMHG | RESPIRATION RATE: 16 BRPM | TEMPERATURE: 98.1 F | HEIGHT: 63 IN

## 2020-07-21 LAB
ANION GAP SERPL CALCULATED.3IONS-SCNC: 16 MMOL/L (ref 7–16)
BASOPHILS ABSOLUTE: 0.02 E9/L (ref 0–0.2)
BASOPHILS RELATIVE PERCENT: 0.1 % (ref 0–2)
BUN BLDV-MCNC: 39 MG/DL (ref 6–20)
CALCIUM SERPL-MCNC: 7.9 MG/DL (ref 8.6–10.2)
CHLORIDE BLD-SCNC: 103 MMOL/L (ref 98–107)
CO2: 22 MMOL/L (ref 22–29)
CREAT SERPL-MCNC: 6.8 MG/DL (ref 0.5–1)
EOSINOPHILS ABSOLUTE: 0 E9/L (ref 0.05–0.5)
EOSINOPHILS RELATIVE PERCENT: 0 % (ref 0–6)
GFR AFRICAN AMERICAN: 9
GFR NON-AFRICAN AMERICAN: 9 ML/MIN/1.73
GLUCOSE BLD-MCNC: 167 MG/DL (ref 74–99)
HCT VFR BLD CALC: 30.9 % (ref 34–48)
HEMOGLOBIN: 10 G/DL (ref 11.5–15.5)
IMMATURE GRANULOCYTES #: 0.14 E9/L
IMMATURE GRANULOCYTES %: 1 % (ref 0–5)
LACTIC ACID: 2.1 MMOL/L (ref 0.5–2.2)
LYMPHOCYTES ABSOLUTE: 1.16 E9/L (ref 1.5–4)
LYMPHOCYTES RELATIVE PERCENT: 8.1 % (ref 20–42)
MAGNESIUM: 2.1 MG/DL (ref 1.6–2.6)
MCH RBC QN AUTO: 31.2 PG (ref 26–35)
MCHC RBC AUTO-ENTMCNC: 32.4 % (ref 32–34.5)
MCV RBC AUTO: 96.3 FL (ref 80–99.9)
METER GLUCOSE: 134 MG/DL (ref 74–99)
METER GLUCOSE: 173 MG/DL (ref 74–99)
METER GLUCOSE: 189 MG/DL (ref 74–99)
MONOCYTES ABSOLUTE: 0.41 E9/L (ref 0.1–0.95)
MONOCYTES RELATIVE PERCENT: 2.9 % (ref 2–12)
NEUTROPHILS ABSOLUTE: 12.54 E9/L (ref 1.8–7.3)
NEUTROPHILS RELATIVE PERCENT: 87.9 % (ref 43–80)
PDW BLD-RTO: 15 FL (ref 11.5–15)
PHOSPHORUS: 5 MG/DL (ref 2.5–4.5)
PLATELET # BLD: 252 E9/L (ref 130–450)
PMV BLD AUTO: 10.5 FL (ref 7–12)
POTASSIUM SERPL-SCNC: 3.6 MMOL/L (ref 3.5–5)
RBC # BLD: 3.21 E12/L (ref 3.5–5.5)
SODIUM BLD-SCNC: 141 MMOL/L (ref 132–146)
WBC # BLD: 14.3 E9/L (ref 4.5–11.5)

## 2020-07-21 PROCEDURE — 6370000000 HC RX 637 (ALT 250 FOR IP)

## 2020-07-21 PROCEDURE — 82962 GLUCOSE BLOOD TEST: CPT

## 2020-07-21 PROCEDURE — 6360000002 HC RX W HCPCS: Performed by: INTERNAL MEDICINE

## 2020-07-21 PROCEDURE — 2580000003 HC RX 258: Performed by: INTERNAL MEDICINE

## 2020-07-21 PROCEDURE — 6370000000 HC RX 637 (ALT 250 FOR IP): Performed by: INTERNAL MEDICINE

## 2020-07-21 PROCEDURE — 84100 ASSAY OF PHOSPHORUS: CPT

## 2020-07-21 PROCEDURE — 83735 ASSAY OF MAGNESIUM: CPT

## 2020-07-21 PROCEDURE — 36415 COLL VENOUS BLD VENIPUNCTURE: CPT

## 2020-07-21 PROCEDURE — 83605 ASSAY OF LACTIC ACID: CPT

## 2020-07-21 PROCEDURE — 99231 SBSQ HOSP IP/OBS SF/LOW 25: CPT | Performed by: INTERNAL MEDICINE

## 2020-07-21 PROCEDURE — 80048 BASIC METABOLIC PNL TOTAL CA: CPT

## 2020-07-21 PROCEDURE — 85025 COMPLETE CBC W/AUTO DIFF WBC: CPT

## 2020-07-21 RX ORDER — PREDNISONE 10 MG/1
TABLET ORAL
Qty: 62 TABLET | Refills: 0 | Status: SHIPPED | OUTPATIENT
Start: 2020-07-21 | End: 2020-09-03 | Stop reason: ALTCHOICE

## 2020-07-21 RX ORDER — LEVOTHYROXINE SODIUM 0.05 MG/1
50 TABLET ORAL DAILY
Qty: 30 TABLET | Refills: 3 | Status: SHIPPED
Start: 2020-07-22 | End: 2020-07-29 | Stop reason: SDUPTHER

## 2020-07-21 RX ORDER — GENTAMICIN SULFATE 1 MG/G
CREAM TOPICAL
Qty: 1 TUBE | Refills: 0 | Status: CANCELLED | OUTPATIENT
Start: 2020-07-21

## 2020-07-21 RX ORDER — LEVOTHYROXINE SODIUM 0.05 MG/1
50 TABLET ORAL DAILY
Qty: 30 TABLET | Refills: 3 | Status: CANCELLED | OUTPATIENT
Start: 2020-07-22

## 2020-07-21 RX ORDER — GENTAMICIN SULFATE 1 MG/G
CREAM TOPICAL
Qty: 1 TUBE | Refills: 0 | Status: SHIPPED | OUTPATIENT
Start: 2020-07-22 | End: 2020-09-03 | Stop reason: ALTCHOICE

## 2020-07-21 RX ADMIN — INSULIN LISPRO 2 UNITS: 100 INJECTION, SOLUTION INTRAVENOUS; SUBCUTANEOUS at 12:14

## 2020-07-21 RX ADMIN — LORAZEPAM 0.5 MG: 0.5 TABLET ORAL at 09:02

## 2020-07-21 RX ADMIN — METOCLOPRAMIDE HYDROCHLORIDE 5 MG: 5 TABLET ORAL at 12:14

## 2020-07-21 RX ADMIN — METOPROLOL TARTRATE 100 MG: 50 TABLET, FILM COATED ORAL at 09:02

## 2020-07-21 RX ADMIN — SODIUM CHLORIDE, PRESERVATIVE FREE 10 ML: 5 INJECTION INTRAVENOUS at 09:03

## 2020-07-21 RX ADMIN — PANTOPRAZOLE SODIUM 40 MG: 40 TABLET, DELAYED RELEASE ORAL at 06:06

## 2020-07-21 RX ADMIN — DILTIAZEM HYDROCHLORIDE 240 MG: 240 CAPSULE, EXTENDED RELEASE ORAL at 09:02

## 2020-07-21 RX ADMIN — METOCLOPRAMIDE HYDROCHLORIDE 5 MG: 5 TABLET ORAL at 09:02

## 2020-07-21 RX ADMIN — LEVOTHYROXINE SODIUM 50 MCG: 0.05 TABLET ORAL at 06:06

## 2020-07-21 RX ADMIN — METHYLPREDNISOLONE SODIUM SUCCINATE 40 MG: 40 INJECTION, POWDER, FOR SOLUTION INTRAMUSCULAR; INTRAVENOUS at 09:02

## 2020-07-21 RX ADMIN — INSULIN GLARGINE 10 UNITS: 100 INJECTION, SOLUTION SUBCUTANEOUS at 09:03

## 2020-07-21 RX ADMIN — INSULIN LISPRO 2 UNITS: 100 INJECTION, SOLUTION INTRAVENOUS; SUBCUTANEOUS at 06:04

## 2020-07-21 ASSESSMENT — PAIN SCALES - GENERAL
PAINLEVEL_OUTOF10: 0
PAINLEVEL_OUTOF10: 0

## 2020-07-21 NOTE — PLAN OF CARE
Patient left before Prednisone taper dose was added to regimen. Medication was prescribed and send to her pharmacy. Patient was called to inform/update her about the medication addition.

## 2020-07-21 NOTE — PROGRESS NOTES
trimethobenzamide    DATA:    CBC with Differential:    Lab Results   Component Value Date    WBC 14.3 07/21/2020    RBC 3.21 07/21/2020    HGB 10.0 07/21/2020    HCT 30.9 07/21/2020     07/21/2020    MCV 96.3 07/21/2020    MCH 31.2 07/21/2020    MCHC 32.4 07/21/2020    RDW 15.0 07/21/2020    SEGSPCT 67 06/27/2013    LYMPHOPCT 8.1 07/21/2020    MONOPCT 2.9 07/21/2020    BASOPCT 0.1 07/21/2020    MONOSABS 0.41 07/21/2020    LYMPHSABS 1.16 07/21/2020    EOSABS 0.00 07/21/2020    BASOSABS 0.02 07/21/2020     CMP:    Lab Results   Component Value Date     07/21/2020    K 3.6 07/21/2020    K 4.1 07/16/2020     07/21/2020    CO2 22 07/21/2020    BUN 39 07/21/2020    CREATININE 6.8 07/21/2020    GFRAA 9 07/21/2020    LABGLOM 9 07/21/2020    GLUCOSE 167 07/21/2020    GLUCOSE 130 05/18/2012    PROT 5.6 07/20/2020    LABALBU 2.9 07/20/2020    LABALBU 4.1 05/18/2012    CALCIUM 7.9 07/21/2020    BILITOT <0.2 07/20/2020    ALKPHOS 140 07/20/2020    AST 24 07/20/2020    ALT 14 07/20/2020     Magnesium:    Lab Results   Component Value Date    MG 2.1 07/21/2020     Phosphorus:    Lab Results   Component Value Date    PHOS 5.0 07/21/2020     Radiology Review:      CXR 7/16/2020   No acute infiltrate           Kidney ultrasound July 17, 2020   Increased echogenicity of the kidneys, this can be seen    with medical renal disease, otherwise unremarkable study.           CT abdomen pelvis 7/18/2020         1. No acute intraabdominal or intrapelvic disease process is    identified.         2. A peritoneal dialysis catheter is noted.  A moderate amount of    ascites is noted.         3. Prior cholecystectomy.                            BRIEF SUMMARY OF INITIAL CONSULT:    Briefly, Paola Rubalcava is a 31year-old female with history of ESRD on RRT (transitioned from intermittent hemodialysis to PD 1 week ago), with severe proteinuria, poorly controlled type I DM with multiple admissions for DKA, gastroparesis, HTN and

## 2020-07-21 NOTE — DISCHARGE SUMMARY
[address]  Discharge Summary    PCP: No primary care provider on file. Admit Date:7/16/2020  Discharge Date: 7/21/2020    Admission Diagnosis:   1. Abdominal pain of unclear etiology 2/2 appendicitis vs. peritonitis vs. nephrolithiasis vs. Pyelonephritis vs. Ectopic pregnancy vs. Ovarian torsion vs. Diverticulitis/ Diverticulosis   2. Intractable nausea and vomiting 2/2 nephrolithiasis vs gastroparesis, vs morning sickness  3. UTI  4. Lightheadedness  5. CKD stage 5  6. DM type I  7. HTN  8. HLD    Discharge Diagnosis:  1. Abdominal pain of unclear origin w/ vomiting, nausea, and diarrhea  2. Intractable nausea nd vomiting 2/2 progressive gastroparesis vs. gastroenteritis vs. Autoimmune Colitis  3. Trichomoniasis -resolved  4. UTI vs asymptomatic bacteruria  5. Hyperlactatemia -resolved  6. CKD Stage 5 w/ diabetic nephropathy on peritoneal dialysis   7. DM Type 1 poorly controlled   8. HTN  9. HLD        10. Vitamin D deficiency  11. Hypothyroidism   12. Hx of anxiety     Hospital Course: The patient is a 31 y.o. female with a PMHx of DM type 1, HTN, gastroparesis, nephrolithiasis, anxiety, CKD stage 5 on PD started on 6/28/20, who presented with intractable vomiting, abdominal pain, fatigue, SOB, and decreased oral intake.     Patient stated that she began having abdominal pain and intractable nausea and vomiting 2 weeks ago. The pain was intermittent and she reported that she had 2 episodes per day, lasting around 1 hour. It was located in the lower abdomen and wrapped around to the right back. She described it as aching that occasionally became severe stabbing. There were no alleviating or exacerbating factors. The nausea and vomiting was constant and she has was unable to have much intake. Reported that she vomited immediately after trying to eat or drink something and the emesis looked like what she just had. Denied bilious or bloody appearing emesis.  She also c/o lightheadedness and SOB when she stood up. She Denied syncope. She has chronic diarrhea that is unchanged, denied hematochezia. She also noticed black spots in her right visual field the past few weeks. She stated that she has been diagnosed with diabetic retinopathy but has not had an eye exam recently. Does not think she is pregnant, she denies sexual contact for the last 6 months.     She notes that she had her peritoneal dialysis catheter inserted on 6/28. She had been undergoing hemodialysis before then. Also noted that her sugars have been fluctuating and ranged from 60 to 500 earlier.     She was seen in the [facility] ED on 7/14 and was diagnosed with a UTI and prescribed Cipro. She still complained of difficulty urinating and dysuria. Denied hematuria. ED Course:She improved slightly, but still felt nauseous. Beta-hydroxybutyrate 0.7. Pro-BNP 4,696. Lactic acid 1.2. Troponin 0.06. Creatinine 5.3, otherwise BMP unremarkable. Alk phos 162. AST/ALT/bili WNL. Lipase 6. Glucose 609->42->187. Hgb 11.1 (baseline 8-9). WBC 11.2. UA: Moderate blood, neg nitrites, neg leuk esterase, >20 WBC, many bacteria. CXR: No acute infiltrate. ED Meds: Patient was given Zofran, Tigan. ED Fluids: Patient was given  mL/hr.     She was admitted to the Internal Medicine floor and worked up for the etiology of her abdominal pain including the possibility of nephrolithiasis, pancreatitis,diverticulitis, pregnancy, ovarian torsion, ectopic pregnancy, diabetic gastroparesis/gastroenteropathy, DKA, appendicitis, autoimmune colitis, UTI, and sexually transmitted disease. Her WBC were 11.2 on admission she was afebrile, denied chills/fever. Urine pregnancy test was negative. Peritoneal catheter was non erythematous and culture can back negative. A CT abdomen and retroperitoneal US were performed, which showed no acute intraabdominal process, and no acute pelvic process. Chest X-ray showed no acute infiltrate.  Urinalysis was positive for moderate blood, negative for nitrites, and negative for leukocyte esterase with many bacteria and >20WBC. She was found to have VRE E. Faecium on urine culture and started on Linezolid. Infectious disease did not recommend any antibiotic treatment for her asymptomatic bacteriuria as the VRE E. Faecium was thought to be due to contamination possibly from her diarrhea. The Linezolid was stopped. She was also found to have Trichomoniasis and given one dose of metronidazole 2,000mg. Her DM I is poorly controlled with a HbA1c 11.1. she was started on sliding scale insulin low dose which had to be increased to medium dose along with lantus 10U and Lispro 12 U. She began to get her appetite back on hospital day 2. Shortly after she began eating she began to complain of diarrhea, worsening abdominal pain, and painful defecation with several episodes throughout the day and night and loose, runny, stool. C diff testing came back negative. She was started on belladona suppository and methylprednisolone 40mg, which seemed to help with the rectal and abdominal pain significantly. On hospital day 4 her diarrhea had mostly subsided and her abdominal pain had significantly improved. She wanted to go home as she missed her kids and was saying she would sign out AMA we counseled her on the need to stay for a few more lab results and she was aggreable. On hospital day 5 she declined abdominal pain/ suprapubic pain and denied diarrhea. She had several episodes of crying and depressed mood during her stay and said she has been feeling down lately. Her home dose of ativan . 5mg was restarted which seemed to help but she continued to feel anxious during her hospital stay so we increased the dose to .5mg TID. She denied suicidal ideation or plan. She was counseled on what to do if feeling suicidal and what to do if her depressive symptoms continue.  She will continue the methylprednisolone 40mg and follow a steroid taper for 26 more days at home. She was found to be hypothyroid with TSH 12.140, T 36.73, and T4 .84 and was started on levothyroxine 50mcg, which will be continued at home. Nephrology, Infectious disease, and GI were consulted. Nephrology recommended continued peritoneal dialysis which she received while in the hospital as well as Bumex 1mg daily. Her lactic acid was found to be elevated during her hospital stay. It was initially 1.2 on 7/16 -->2.6 on  7/19 --> 2.9 on 7/20  -->1.9 ---> 2. This hyperlactatemia was likely due to contrast toxcity, or linezolid toxicity. The lactic acid improved after IV fluid administration. She was found to have a low vitamin D level of 12, Vitamin D was replaced. We were concerned for COVID 19 infection causing her GI symptoms but she declined the COVID 19 test several times. She says she was feeling well and was medically cleared for discharge. She was hemodynamically stable for discharge and was instructed on follow up plans and medications    Significant findings (history and exam, laboratory, radiological, pathology, other tests):   · General Appearance: alert, appears stated age and cooperative  · HEENT:  Head: Normocephalic, no lesions, without obvious abnormality. · Neck: no adenopathy, no carotid bruit, no JVD, supple, symmetrical, trachea midline and thyroid not enlarged, symmetric, no tenderness/mass/nodules  · Lung: clear to auscultation bilaterally  · Heart: regular rate and rhythm, S1, S2 normal, no murmur, click, rub or gallop  · Abdomen: soft, non-tender; bowel sounds normal; no masses,  no organomegaly  · Extremities:  extremities normal, atraumatic, no cyanosis or edema  · Musculokeletal: No joint swelling, no muscle tenderness. ROM normal in all joints of extremities.    · Neurologic: Mental status: Alert, oriented, thought content appropriate    CBC with Differential:    Lab Results   Component Value Date     WBC 9.6 07/20/2020     RBC 3.27 07/20/2020     HGB 10.0 07/20/2020     HCT 31.8 07/20/2020      07/20/2020     MCV 97.2 07/20/2020     MCH 30.6 07/20/2020     MCHC 31.4 07/20/2020     RDW 14.5 07/20/2020     SEGSPCT 67 06/27/2013     LYMPHOPCT 9.3 07/20/2020     MONOPCT 1.4 07/20/2020     BASOPCT 0.3 07/20/2020     MONOSABS 0.13 07/20/2020     LYMPHSABS 0.89 07/20/2020     EOSABS 0.00 07/20/2020     BASOSABS 0.03 07/20/2020     BMP:          Lab Results   Component Value Date      07/20/2020     K 4.1 07/20/2020     K 4.1 07/16/2020      07/20/2020     CO2 20 07/20/2020     BUN 29 07/20/2020     LABALBU 2.5 07/18/2020     LABALBU 4.1 05/18/2012     CREATININE 5.8 07/20/2020     CALCIUM 7.8 07/20/2020     GFRAA 10 07/20/2020     LABGLOM 10 07/20/2020     GLUCOSE 351 07/20/2020     GLUCOSE 130 05/18/2012     Hepatic Function Panel:          Lab Results   Component Value Date     ALKPHOS 133 07/18/2020     ALT 7 07/18/2020     AST 11 07/18/2020     PROT 4.9 07/18/2020     BILITOT <0.2 07/18/2020     BILIDIR <0.2 07/18/2020     IBILI see below 07/18/2020     LABALBU 2.5 07/18/2020     LABALBU 4.1 05/18/2012     Magnesium:    Lab Results   Component Value Date     MG 1.6 07/20/2020     Phosphorus:          Lab Results   Component Value Date     PHOS 4.9 07/20/2020     U/A:          Lab Results   Component Value Date     COLORU Yellow 07/17/2020     PROTEINU >=300 07/17/2020     PHUR 6.0 07/17/2020     LABCAST RARE 01/12/2020     WBCUA 5-10 07/17/2020     WBCUA 0-1 05/18/2012     RBCUA 10-20 07/17/2020     RBCUA 1-3 08/01/2013     MUCUS Present 02/12/2016     TRICHOMONAS Present 07/17/2020     YEAST RARE 05/24/2018     BACTERIA FEW 07/17/2020     CLARITYU Clear 07/17/2020     SPECGRAV 1.020 07/17/2020     LEUKOCYTESUR SMALL 07/17/2020     UROBILINOGEN 0.2 07/17/2020     BILIRUBINUR Negative 07/17/2020     BILIRUBINUR SMALL 05/18/2012     BLOODU MODERATE 07/17/2020     GLUCOSEU >=1000 07/17/2020     GLUCOSEU >=1000 05/18/2012     AMORPHOUS RARE 11/01/2019 HgBA1c:          Lab Results   Component Value Date     LABA1C 8.6 2020             CT ABDOMEN PELVIS W IV CONTRAST Additional Contrast? None [[id number]]  Resulted: 20 1400       Order Status: Completed  Updated: 20 1402       Narrative:         Patient MRN:  [id number]   : 1990   Age: 31 years   Gender: Female   Order Date:  2020 10:15 AM   EXAM: CT ABDOMEN PELVIS W IV CONTRAST   NUMBER OF IMAGES \ views:  [id number]   INDICATION:  RO appenditicitis vs PID   RO appenditicitis vs PID   COMPARISON: None     Technique: Low-dose CT  acquisition technique included one of   following options; 1 . Automated exposure control, 2. Adjustment of MA   and or KV according to patient's size or 3. Use of iterative   reconstruction. Multiple computerized tomography sections of the abdomen with sagittal   and coronal MPR reconstructions were obtained from the top of the   diaphragm to the pelvis.  The visualized portions of the abdomen   reveal:     The lung bases are unremarkable. The liver is unremarkable. Prior cholecystectomy. The spleen is unremarkable. The kidneys are unremarkable. The adrenals is  unremarkable. The pancreas is unremarkable. The appendix is not visualized. However there are no secondary signs   inflammation is region to suggest appendicitis. A peritoneal dialysis catheter is noted. A moderate amount of ascites   is noted. The bowel is  unremarkable. The bladder is unremarkable.          Impression:           1. No acute intraabdominal or intrapelvic disease process is   identified. 2. A peritoneal dialysis catheter is noted. A moderate amount of   ascites is noted. 3. Prior cholecystectomy. Consults:  1. Nephrology  2. Infectious Disease     Procedures:  1.  Peritoneal Dialysis     Condition at discharge: Stable    Disposition: home    Discharge Medications:  Current Discharge Medication List      START taking these medications    Details levothyroxine (SYNTHROID) 50 MCG tablet Take 1 tablet by mouth Daily  Qty: 30 tablet, Refills: 3      gentamicin (GARAMYCIN) 0.1 % cream Apply topically 3 times daily. Qty: 1 Tube, Refills: 0         CONTINUE these medications which have NOT CHANGED    Details   ciprofloxacin (CIPRO) 500 MG tablet Take 500 mg by mouth 2 times daily      potassium chloride (KLOR-CON) 20 MEQ packet Indications: unknown dose       atorvastatin (LIPITOR) 40 MG tablet Take 1 tablet by mouth nightly  Qty: 30 tablet, Refills: 0      metoprolol (LOPRESSOR) 100 MG tablet Take 1 tablet by mouth 2 times daily  Qty: 60 tablet, Refills: 0      diltiazem (CARDIZEM CD) 240 MG extended release capsule Take 1 capsule by mouth daily  Qty: 30 capsule, Refills: 3      bumetanide (BUMEX) 1 MG tablet Take 1 tablet by mouth daily  Qty: 30 tablet, Refills: 3      LORazepam (ATIVAN) 0.5 MG tablet Take 0.5 mg by mouth 2 times daily as needed for Anxiety. Metoclopramide HCl (REGLAN PO) Take by mouth 3 times daily (with meals)      epoetin nena-epbx (RETACRIT) 4000 UNIT/ML SOLN injection Inject 2 mLs into the skin three times a week Per Nephrology  Qty: 16.5 mL, Refills: 0      insulin glargine (BASAGLAR KWIKPEN) 100 UNIT/ML injection pen Inject 10 Units into the skin 2 times daily  Qty: 5 pen, Refills: 0      vitamin D (ERGOCALCIFEROL) 1.25 MG (08388 UT) CAPS capsule Take 1 capsule by mouth once a week  Qty: 5 capsule, Refills: 0      insulin aspart (NOVOLOG) 100 UNIT/ML injection vial Inject 0-10 Units into the skin 3 times daily (before meals) *Per Sliding Scale*      glucose (GLUTOSE) 40 % GEL Take 15 g by mouth as needed  Qty: 45 g, Refills: 1      glucose blood VI test strips (ASCENSIA AUTODISC VI;ONE TOUCH ULTRA TEST VI) strip Indications: 5x a day Freestyle Lite -Tests 5 times daily and as needed for low glucose. E10.10  Qty: 200 strip, Refills: 3      LANCETS THIN by Does not apply route.  Indications: cks 5xa a day      Insulin Syringe-Needle U-100 (INSULIN SYRINGE .5CC/30GX1/2\") 30G X 1/2\" 0.5 ML MISC by Does not apply route. Indications: uses 6-7 a day         STOP taking these medications       famotidine (PEPCID) 20 MG tablet Comments:   Reason for Stopping:               Activity: activity as tolerated  Diet: diabetic diet    Follow-up appointments:   1. Internal Medicine Ambulatory Clinic on 9.30 am on 29 August . Call [phone] ([address]  Internal Medicine Clinic) if there are any appointment changes or other issues. It is very important that you keep this appointment. Patient Instructions: [facility] Internal Medicine Resident Service    Discharge to:  Home  Diet: Regular  Activity: As tolerated     Be compliant with medications    We started you on Levothyroxine, it is a medication for your thyroid and to help it maintain its function appropriately. Taking this medication will well with improvement of your mood, body aches and pains, lethargy as well. You do need a Primary care physician to increase the dose slowly or keep an eye on your thyroid function. We continued your prednisone take 40mg (4 tablets) for 5 days, then 30mg (3 tablets) for 7 days, then 20mg ( 2 tablets) for 7 days, then 10mg (1 tab) for 7 days. Place continue  To eat and drink appropriate amount of fluid. Please ensure your blood sugar levels are not higher than 200 as this can lead multiple organ failure, more chance of stroke and heart attack. Please make sure you have appropriate     Please get the following labs done before your follow up visit. - BMP, lactic acid     Consider being tested for COVID 19 as it can cause GI symptoms. If feeling still feeling depressed please speak with your primary care provider about how you are feeling so that they can get you contacted with a therapist or start you on some medication. If feeling suicidal please call [phone] available 24 hours per day.     Follow up  Internal Medicine Ambulatory Clinic on 9.30 am on 29 August . Call [phone] ([address] [facility] Internal Medicine Clinic) if there are any appointment changes or other issues. It is very important that you keep this appointment. Hypothyroidism: Care Instructions  Your Care Instructions     When you have hypothyroidism, your body doesn't make enough thyroid hormone. This hormone helps your body use energy. If your thyroid level is low, you may feel tired, be constipated, have an increase in your blood pressure, or have dry skin or memory problems. You may also get cold easily, even when it is warm. Women with low thyroid levels may have heavy menstrual periods. A blood test to find your thyroid-stimulating hormone (TSH) level is used to check for hypothyroidism. A high TSH level may mean that you have it. The treatment for hypothyroidism is thyroid hormone pills. You should start to feel better in 1 to 2 weeks. Most people need treatment for the rest of their lives. You will need regular visits with your doctor to make sure you are doing well and that you have the right dose of medicine. Follow-up care is a key part of your treatment and safety. Be sure to make and go to all appointments, and call your doctor if you are having problems. It's also a good idea to know your test results and keep a list of the medicines you take. How can you care for yourself at home? · Take your thyroid hormone medicine exactly as prescribed. Call your doctor if you think you are having a problem with your medicine. Most people do not have side effects if they take the right amount of medicine regularly. ? Take the medicine 30 minutes before breakfast, and do not take it with calcium, vitamins, or iron. ? Do not take extra doses of your thyroid medicine. It will not help you get better any faster, and it may cause side effects. ? If you forget to take a dose, do NOT take a double dose of medicine. Take your usual dose the next day.   · Tell your doctor about all prescription, herbal, or over-the-counter products you take. · Take care of yourself. Eat a healthy diet, get enough sleep, and get regular exercise. When should you call for help? [id number] anytime you think you may need emergency care. For example, call if:  · You passed out (lost consciousness). · You have severe trouble breathing. · You have a very slow heartbeat (less than 60 beats a minute). · You have a low body temperature (95°F or below). Call your doctor now or seek immediate medical care if:  · You feel tired, sluggish, or weak. · You have trouble remembering things or concentrating. · You do not begin to feel better 2 weeks after starting your medicine. Watch closely for changes in your health, and be sure to contact your doctor if you have any problems. Where can you learn more? Go to [email or web address] org and sign in to your [employer] account. Enter C430 in the [employer] box to learn more about \"Hypothyroidism: Care Instructions. \"     If you do not have an account, please click on the \"Sign Up Now\" link. Current as of: July 29, 2019               Content Version: 12.5  © 7667-2279 Healthwise, Incorporated. Care instructions adapted under license by [facility] [place] ([facility]). If you have questions about a medical condition or this instruction, always ask your healthcare professional. [address]  any warranty or liability for your use of this information.       [address] [place] [address]  PGY 1   11:56 AM 7/21/2020

## 2020-07-21 NOTE — CARE COORDINATION
7/21/2020 pt continues to do peritoneal dialysis. She voices no needs at present. States she is ready to go home. Discharge plan remains home when medically stable. SW/CM will continue to folow.

## 2020-07-21 NOTE — PLAN OF CARE
Problem: Falls - Risk of:  Goal: Will remain free from falls  Description: Will remain free from falls  Outcome: Met This Shift  Goal: Absence of physical injury  Description: Absence of physical injury  Outcome: Met This Shift     Problem: Skin Integrity:  Goal: Absence of new skin breakdown  Description: Absence of new skin breakdown  Outcome: Met This Shift

## 2020-07-21 NOTE — PROGRESS NOTES
[address] [facility]  Internal Medicine Residency / [address]    Attending Physician Statement  I have discussed the case, including pertinent history and exam findings with the resident and the team.  I have seen and examined the patient and the key elements of the encounter have been performed by me. I agree with the assessment, plan and orders as documented by the resident. Alert and less depressed when she talks about children  Tolerated Peritoneal dialysis   VS stable  No evidence for DKA  Transient elevation of lactate yesterday  Plan: Discharge planning off Zyvox            And will discharge on Prednisone to be tapered weekly until GI sees her    Remainder of medical problems as per resident note.       Aster Robbins  Internal Medicine Residency Faculty

## 2020-07-22 LAB
BODY FLUID CULTURE, STERILE: NORMAL
C. TRACHOMATIS DNA ,URINE: NEGATIVE
CALPROTECTIN: 21 UG/G
GRAM STAIN RESULT: NORMAL
N. GONORRHOEAE DNA, URINE: NEGATIVE
SOURCE: NORMAL

## 2020-07-23 LAB
BLOOD CULTURE, ROUTINE: NORMAL
BLOOD CULTURE, ROUTINE: NORMAL
CULTURE, BLOOD 2: NORMAL

## 2020-07-24 PROBLEM — E88.09 HYPOALBUMINEMIA: Status: ACTIVE | Noted: 2020-07-24

## 2020-07-24 RX ORDER — SODIUM CHLORIDE 0.9 % (FLUSH) 0.9 %
10 SYRINGE (ML) INJECTION PRN
Status: CANCELLED | OUTPATIENT
Start: 2020-08-17

## 2020-07-24 RX ORDER — ALBUMIN (HUMAN) 12.5 G/50ML
25 SOLUTION INTRAVENOUS ONCE
Status: CANCELLED
Start: 2020-08-17

## 2020-07-29 ENCOUNTER — SOCIAL WORK (OUTPATIENT)
Dept: INTERNAL MEDICINE | Age: 30
End: 2020-07-29

## 2020-07-29 ENCOUNTER — OFFICE VISIT (OUTPATIENT)
Dept: INTERNAL MEDICINE | Age: 30
End: 2020-07-29
Payer: COMMERCIAL

## 2020-07-29 VITALS
DIASTOLIC BLOOD PRESSURE: 112 MMHG | TEMPERATURE: 97.6 F | BODY MASS INDEX: 24.16 KG/M2 | WEIGHT: 141.5 LBS | HEIGHT: 64 IN | RESPIRATION RATE: 16 BRPM | SYSTOLIC BLOOD PRESSURE: 196 MMHG | HEART RATE: 91 BPM

## 2020-07-29 PROCEDURE — 99215 OFFICE O/P EST HI 40 MIN: CPT | Performed by: INTERNAL MEDICINE

## 2020-07-29 PROCEDURE — 3052F HG A1C>EQUAL 8.0%<EQUAL 9.0%: CPT | Performed by: INTERNAL MEDICINE

## 2020-07-29 PROCEDURE — 4004F PT TOBACCO SCREEN RCVD TLK: CPT | Performed by: INTERNAL MEDICINE

## 2020-07-29 PROCEDURE — 2022F DILAT RTA XM EVC RTNOPTHY: CPT | Performed by: INTERNAL MEDICINE

## 2020-07-29 PROCEDURE — G8420 CALC BMI NORM PARAMETERS: HCPCS | Performed by: INTERNAL MEDICINE

## 2020-07-29 PROCEDURE — G8427 DOCREV CUR MEDS BY ELIG CLIN: HCPCS | Performed by: INTERNAL MEDICINE

## 2020-07-29 PROCEDURE — 96160 PT-FOCUSED HLTH RISK ASSMT: CPT | Performed by: INTERNAL MEDICINE

## 2020-07-29 PROCEDURE — 1111F DSCHRG MED/CURRENT MED MERGE: CPT | Performed by: INTERNAL MEDICINE

## 2020-07-29 PROCEDURE — 99212 OFFICE O/P EST SF 10 MIN: CPT | Performed by: INTERNAL MEDICINE

## 2020-07-29 RX ORDER — METOPROLOL TARTRATE 100 MG/1
100 TABLET ORAL 2 TIMES DAILY
Qty: 60 TABLET | Refills: 0 | Status: SHIPPED | OUTPATIENT
Start: 2020-07-29 | End: 2020-09-03 | Stop reason: SDUPTHER

## 2020-07-29 RX ORDER — CLONIDINE HYDROCHLORIDE 0.1 MG/1
0.1 TABLET ORAL 2 TIMES DAILY
Qty: 60 TABLET | Refills: 3 | Status: SHIPPED
Start: 2020-07-29 | End: 2020-09-03 | Stop reason: SINTOL

## 2020-07-29 RX ORDER — LANCETS
EACH MISCELLANEOUS
Qty: 100 EACH | Refills: 5 | Status: SHIPPED
Start: 2020-07-29 | End: 2020-11-20

## 2020-07-29 RX ORDER — LORAZEPAM 0.5 MG/1
0.25 TABLET ORAL EVERY 12 HOURS PRN
Qty: 10 TABLET | Refills: 1 | Status: SHIPPED | OUTPATIENT
Start: 2020-07-29 | End: 2020-08-28

## 2020-07-29 RX ORDER — ATORVASTATIN CALCIUM 40 MG/1
40 TABLET, FILM COATED ORAL NIGHTLY
Qty: 30 TABLET | Refills: 0 | Status: SHIPPED | OUTPATIENT
Start: 2020-07-29 | End: 2020-09-03 | Stop reason: SDUPTHER

## 2020-07-29 RX ORDER — LEVOTHYROXINE SODIUM 0.05 MG/1
50 TABLET ORAL DAILY
Qty: 30 TABLET | Refills: 3 | Status: SHIPPED | OUTPATIENT
Start: 2020-07-29 | End: 2020-09-03 | Stop reason: SDUPTHER

## 2020-07-29 RX ORDER — BUMETANIDE 1 MG/1
1 TABLET ORAL DAILY
Qty: 30 TABLET | Refills: 3 | Status: SHIPPED | OUTPATIENT
Start: 2020-07-29 | End: 2020-09-03 | Stop reason: SDUPTHER

## 2020-07-29 RX ORDER — LISINOPRIL 20 MG/1
20 TABLET ORAL DAILY
Qty: 30 TABLET | Refills: 2 | Status: SHIPPED | OUTPATIENT
Start: 2020-07-29 | End: 2020-09-03 | Stop reason: SDUPTHER

## 2020-07-29 RX ORDER — DILTIAZEM HYDROCHLORIDE 240 MG/1
240 CAPSULE, COATED, EXTENDED RELEASE ORAL DAILY
Qty: 30 CAPSULE | Refills: 3 | Status: SHIPPED | OUTPATIENT
Start: 2020-07-29 | End: 2020-09-03 | Stop reason: SDUPTHER

## 2020-07-29 RX ORDER — LORAZEPAM 0.5 MG/1
0.5 TABLET ORAL NIGHTLY
Qty: 30 TABLET | Refills: 1 | Status: CANCELLED | OUTPATIENT
Start: 2020-07-29 | End: 2020-08-28

## 2020-07-29 RX ORDER — METOCLOPRAMIDE 5 MG/1
5 TABLET ORAL
Qty: 120 TABLET | Refills: 3 | Status: SHIPPED | OUTPATIENT
Start: 2020-07-29 | End: 2020-09-03 | Stop reason: SDUPTHER

## 2020-07-29 RX ORDER — ERGOCALCIFEROL 1.25 MG/1
50000 CAPSULE ORAL WEEKLY
Qty: 5 CAPSULE | Refills: 0 | Status: SHIPPED | OUTPATIENT
Start: 2020-07-29 | End: 2020-09-03 | Stop reason: SDUPTHER

## 2020-07-29 SDOH — ECONOMIC STABILITY: TRANSPORTATION INSECURITY
IN THE PAST 12 MONTHS, HAS THE LACK OF TRANSPORTATION KEPT YOU FROM MEDICAL APPOINTMENTS OR FROM GETTING MEDICATIONS?: NO

## 2020-07-29 SDOH — ECONOMIC STABILITY: INCOME INSECURITY: HOW HARD IS IT FOR YOU TO PAY FOR THE VERY BASICS LIKE FOOD, HOUSING, MEDICAL CARE, AND HEATING?: VERY HARD

## 2020-07-29 SDOH — ECONOMIC STABILITY: FOOD INSECURITY: WITHIN THE PAST 12 MONTHS, THE FOOD YOU BOUGHT JUST DIDN'T LAST AND YOU DIDN'T HAVE MONEY TO GET MORE.: NEVER TRUE

## 2020-07-29 SDOH — ECONOMIC STABILITY: FOOD INSECURITY: WITHIN THE PAST 12 MONTHS, YOU WORRIED THAT YOUR FOOD WOULD RUN OUT BEFORE YOU GOT MONEY TO BUY MORE.: NEVER TRUE

## 2020-07-29 SDOH — ECONOMIC STABILITY: TRANSPORTATION INSECURITY
IN THE PAST 12 MONTHS, HAS LACK OF TRANSPORTATION KEPT YOU FROM MEETINGS, WORK, OR FROM GETTING THINGS NEEDED FOR DAILY LIVING?: NO

## 2020-07-29 ASSESSMENT — PATIENT HEALTH QUESTIONNAIRE - PHQ9
6. FEELING BAD ABOUT YOURSELF - OR THAT YOU ARE A FAILURE OR HAVE LET YOURSELF OR YOUR FAMILY DOWN: 2
7. TROUBLE CONCENTRATING ON THINGS, SUCH AS READING THE NEWSPAPER OR WATCHING TELEVISION: 1
9. THOUGHTS THAT YOU WOULD BE BETTER OFF DEAD, OR OF HURTING YOURSELF: 0
2. FEELING DOWN, DEPRESSED OR HOPELESS: 2
SUM OF ALL RESPONSES TO PHQ QUESTIONS 1-9: 12
SUM OF ALL RESPONSES TO PHQ9 QUESTIONS 1 & 2: 4
3. TROUBLE FALLING OR STAYING ASLEEP: 2
8. MOVING OR SPEAKING SO SLOWLY THAT OTHER PEOPLE COULD HAVE NOTICED. OR THE OPPOSITE, BEING SO FIGETY OR RESTLESS THAT YOU HAVE BEEN MOVING AROUND A LOT MORE THAN USUAL: 0
1. LITTLE INTEREST OR PLEASURE IN DOING THINGS: 2
10. IF YOU CHECKED OFF ANY PROBLEMS, HOW DIFFICULT HAVE THESE PROBLEMS MADE IT FOR YOU TO DO YOUR WORK, TAKE CARE OF THINGS AT HOME, OR GET ALONG WITH OTHER PEOPLE: 1
5. POOR APPETITE OR OVEREATING: 1
4. FEELING TIRED OR HAVING LITTLE ENERGY: 2

## 2020-07-29 NOTE — PROGRESS NOTES
Pt screened positive for SDOH related to financial strain and or food insecurity and declined further contact for assessment/resources.

## 2020-07-29 NOTE — PROGRESS NOTES
NOAH consulted to meet with patient who presents to  clinic for hospital f/u appointment and has elevated PHQ-9 score. Patient reports a hx of multiple previous mental health dx's to include bipolar disorder, PTSD, depression and anxiety. Patient reports she was previously active with [facility] (counseling and psychiatry)  but has not been seen for a few months. She reports she was told she would not be able to see psychiatrist until October and was not wanting to return to [facility] anyway. She reports she was unhappy with stating how each time she would go \"they would try to pink slip me. \"  She reports two previous psychiatric hospitalizations with the last one being about a year ago when she attempted suicide by trying to OD on her insulin and was admitted to [facility]. Patient reports that a few years prior to that she was hospitalized at [facility] for SI. Patient denies current SI/plan/intent (C-SSRS completed). She does endorse passive SI a few weeks ago stating \"just thoughts\" with no specific plans/intent. Patient reports most recently struggling with sleep, racing thoughts, feeling increasingly anxious. Reports she has been out of her ativan for a few days now and is contributing this to her elevated level of anxiety. Patient would like to establish with new outpatient mental health provider for both counseling and psychiatry as appropriate due to her multiple mental health diagnoses and hx indicating need for longer-term tx options vs. [facility] integrated tx. Patient is a resident in [address] [place] [address] so discussed referral to [facility] [employer] [facility]. Offered option for IOP but patient declined. [employer] with patient who was then scheduled for intake assessment tomorrow, 7/30 and will then be scheduled with counselor and pre scriber. Completed thorough safety planning and lethal means counseling. Patient reports her mother with whom she resides is who holds onto and administers patients insulin.  Provided crisis

## 2020-07-29 NOTE — PROGRESS NOTES
Post-Discharge Transitional Care Management Services or Hospital Follow Up      [address]   YOB: 1990    Date of Office Visit:  2020  Date of Hospital Admission: 20  Date of Hospital Discharge: 20  Risk of hospital readmission (high >=14%.  Medium >=10%) :Readmission Risk Score: 64      Care management risk score Rising risk (score 2-5) and Complex Care (Scores >=6): 2     Non face to face  following discharge, date last encounter closed (first attempt may have been earlier): *No documented post hospital discharge outreach found in the last 14 days    Call initiated 2 business days of discharge: *No response recorded in the last 14 days    Patient Active Problem List   Diagnosis    High-risk pregnancy    Previous stillbirth or demise, antepartum    Non compliance w medication regimen    Dyspnea on exertion    Tobacco smoking complicating pregnancy    Drug use complicating pregnancy in third trimester    MTHFR mutation (Shayy Lewis)    Poorly controlled type 1 diabetes mellitus (Shayy Lewis)    Diabetes mellitus type 1, uncontrolled (Shayy Lewis)    Previous  delivery affecting pregnancy, antepartum    Oligohydramnios    Kidney stones    Menses painful    Generalized abdominal pain    Opioid abuse, episodic (Shayy Lewis)    Tobacco use    Diabetic ketoacidosis without coma associated with type 1 diabetes mellitus (Shayy Lewis)    Septicemia (Shayy Lewis)    Hypoglycemia    Hypertension    DKA, type 1, not at goal [facility])    Hyponatremia    Other specified diabetes mellitus with other specified complication (Shayy Lewis)    First trimester pregnancy    Acute electrocardiogram changes    Acute kidney injury superimposed on CKD (Shayy Lewis)    Diabetic gastroparesis associated with type 1 diabetes mellitus (Shayy Lewis)    Diarrhea in adult patient    Generalized abdominal pain    Acute kidney injury superimposed on chronic kidney disease (Shayy Lewis)    Acute gastroenteritis    High anion gap metabolic acidosis    Iron deficiency anemia    Headache    Acute respiratory failure with hypoxia (HCC)    Aspiration pneumonia of both lungs (HCC)    Acute cholecystitis    Chest pain, unspecified    Sinus tachycardia    Orthostatic hypotension    Bladder dysfunction    C. difficile colitis    Altered mental status    Acute heart failure with preserved ejection fraction (HCC)    Hypervolemia    Chronic kidney disease    Hypokalemia    Cellulitis of right hand    Mild protein-calorie malnutrition (HCC)    Chronic renal impairment    Acute congestive heart failure (HCC)    Hypertensive encephalopathy    Intractable vomiting with nausea    Intractable nausea and vomiting    Hypoalbuminemia       Allergies   Allergen Reactions    Toradol [Ketorolac Tromethamine] Anaphylaxis and Hives       Medications listed as ordered at the time of discharge from [facility] [address] Medication Instructions YYO:[id number]    Printed on:07/29/20 2088   Medication Information                      atorvastatin (LIPITOR) 40 MG tablet  Take 1 tablet by mouth nightly             bumetanide (BUMEX) 1 MG tablet  Take 1 tablet by mouth daily             diltiazem (CARDIZEM CD) 240 MG extended release capsule  Take 1 capsule by mouth daily             epoetin nena-epbx (RETACRIT) 4000 UNIT/ML SOLN injection  Inject 2 mLs into the skin three times a week Per Nephrology             gentamicin (GARAMYCIN) 0.1 % cream  Apply topically 3 times daily. glucose (GLUTOSE) 40 % GEL  Take 15 g by mouth as needed             glucose blood VI test strips (ASCENSIA AUTODISC VI;ONE TOUCH ULTRA TEST VI) strip  Indications: 5x a day Freestyle Lite -Tests 5 times daily and as needed for low glucose.   E10.10             insulin aspart (NOVOLOG) 100 UNIT/ML injection vial  Inject 0-10 Units into the skin 3 times daily (before meals) *Per Sliding Scale*             insulin glargine (BASAGLAR KWIKPEN) 100 UNIT/ML injection pen  Inject 10 Units into the skin 2 times daily             Insulin Syringe-Needle U-100 (INSULIN SYRINGE .5CC/30GX1/2\") 30G X 1/2\" 0.5 ML MISC  by Does not apply route. Indications: uses 6-7 a day             LANCETS THIN  by Does not apply route. Indications: cks 5xa a day             levothyroxine (SYNTHROID) 50 MCG tablet  Take 1 tablet by mouth Daily             LORazepam (ATIVAN) 0.5 MG tablet  Take 0.5 mg by mouth 2 times daily as needed for Anxiety. Metoclopramide HCl (REGLAN PO)  Take by mouth 3 times daily (with meals)             metoprolol (LOPRESSOR) 100 MG tablet  Take 1 tablet by mouth 2 times daily             predniSONE (DELTASONE) 10 MG tablet  Take 4 tabs x 5 days, 3 tabs x 7 days, 2 tabs x 7 days, 1 tab x 7 days, stop.             vitamin D (ERGOCALCIFEROL) 1.25 MG (17590 UT) CAPS capsule  Take 1 capsule by mouth once a week                   Medications marked \"taking\" at this time  Outpatient Medications Marked as Taking for the 7/29/20 encounter (Office Visit) with Morales Jacobson MD   Medication Sig Dispense Refill    levothyroxine (SYNTHROID) 50 MCG tablet Take 1 tablet by mouth Daily 30 tablet 3    gentamicin (GARAMYCIN) 0.1 % cream Apply topically 3 times daily. 1 Tube 0    predniSONE (DELTASONE) 10 MG tablet Take 4 tabs x 5 days, 3 tabs x 7 days, 2 tabs x 7 days, 1 tab x 7 days, stop.  62 tablet 0    Metoclopramide HCl (REGLAN PO) Take by mouth 3 times daily (with meals)      epoetin nena-epbx (RETACRIT) 4000 UNIT/ML SOLN injection Inject 2 mLs into the skin three times a week Per Nephrology 16.5 mL 0    insulin glargine (BASAGLAR KWIKPEN) 100 UNIT/ML injection pen Inject 10 Units into the skin 2 times daily 5 pen 0    atorvastatin (LIPITOR) 40 MG tablet Take 1 tablet by mouth nightly 30 tablet 0    metoprolol (LOPRESSOR) 100 MG tablet Take 1 tablet by mouth 2 times daily 60 tablet 0    vitamin D (ERGOCALCIFEROL) 1.25 MG (55354 UT) CAPS capsule Take 1 capsule by mouth once a week (Patient taking differently: Take 50,000 Units by mouth once a week SATURDAY) 5 capsule 0    diltiazem (CARDIZEM CD) 240 MG extended release capsule Take 1 capsule by mouth daily 30 capsule 3    bumetanide (BUMEX) 1 MG tablet Take 1 tablet by mouth daily 30 tablet 3    LORazepam (ATIVAN) 0.5 MG tablet Take 0.5 mg by mouth 2 times daily as needed for Anxiety.  insulin aspart (NOVOLOG) 100 UNIT/ML injection vial Inject 0-10 Units into the skin 3 times daily (before meals) *Per Sliding Scale*      glucose (GLUTOSE) 40 % GEL Take 15 g by mouth as needed 45 g 1    glucose blood VI test strips (ASCENSIA AUTODISC VI;ONE TOUCH ULTRA TEST VI) strip Indications: 5x a day Freestyle Lite -Tests 5 times daily and as needed for low glucose. E10.10 200 strip 3    LANCETS THIN by Does not apply route. Indications: cks 5xa a day      Insulin Syringe-Needle U-100 (INSULIN SYRINGE .5CC/30GX1/2\") 30G X 1/2\" 0.5 ML MISC by Does not apply route. Indications: uses 6-7 a day          Medications patient taking as of now reconciled against medications ordered at time of hospital discharge: Yes    Chief Complaint   Patient presents with   [address] from Hospital       History of Present illness - Follow up of Hospital diagnosis(es):   1. Abdominal pain of unclear origin w/ vomiting, nausea, and diarrhea  2. Intractable nausea nd vomiting 2/2 progressive gastroparesis vs. gastroenteritis vs. Autoimmune Colitis  3. Trichomoniasis -resolved  4. UTI vs asymptomatic bacteruria  5. Hyperlactatemia -resolved  6. CKD Stage 5 w/ diabetic nephropathy on peritoneal dialysis   7. DM Type 1 poorly controlled   8. HTN  9. HLD        10. Vitamin D deficiency  11. Hypothyroidism   12. Hx of anxiety     Inpatient course: Discharge summary reviewed- see chart. Interval history/Current status: abd pain, nausea and diarrhea improved     A comprehensive review of systems was negative except for what was noted in the HPI.     Vitals: resume clonidine and lisinopril, room to go up if remain uncontrolled   - cloNIDine (CATAPRES) 0.1 MG tablet; Take 1 tablet by mouth 2 times daily  Dispense: 60 tablet; Refill: 3  - lisinopril (PRINIVIL;ZESTRIL) 20 MG tablet; Take 1 tablet by mouth daily  Dispense: 30 tablet; Refill: 2    3. Type 1 diabetes mellitus with nephropathy (HCC)  Last A1c 8.6, BG very labile at home, on basaglar 10 U BID and 12 lispro w meal TID  Plan to taper down prednisone to 10 mg QD   - blood glucose test strips (ASCENSIA AUTODISC VI;ONE TOUCH ULTRA TEST VI) strip; Indications: 5x a day Freestyle Lite -Tests 5 times daily and as needed for low glucose. E10.10  Dispense: 200 strip; Refill: 3  - Lancets Thin MISC; Indications: cks 5xa a day cks 5xa a day  Dispense: 100 each; Refill: 5    4. Gastroparesis  - metoclopramide (REGLAN) 5 MG tablet; Take 1 tablet by mouth 3 times daily (with meals)  Dispense: 120 tablet; Refill: 3    5. Hyperlipidemia, unspecified hyperlipidemia type  - atorvastatin (LIPITOR) 40 MG tablet; Take 1 tablet by mouth nightly  Dispense: 30 tablet; Refill: 0    6. Hypothyroidism, unspecified type  - levothyroxine (SYNTHROID) 50 MCG tablet; Take 1 tablet by mouth Daily  Dispense: 30 tablet; Refill: 3    7.  Depression, anxiety  SW consult, scheduled new evaluation 7/30  Followed with [facility] in the past, 2 suicidal ideation previously, not active suicidal now, off depression medication for several months, mood stable, refuse follow herman back  Ativan 0.25 q12 PRN         Medical Decision Making: high complexity    Plan discussed with Dr April Ayala    RTC in 4 weeks

## 2020-07-29 NOTE — PATIENT INSTRUCTIONS
Please call Dr Xiomara Cruz office to get an appointment soon  Please start taking clonidine 0.1 mg 2 times a day and lisinopril 20 mg daily for you blood pressure manage   Need to talk with him regarding blood pressure management  Please take 1 tab of prednisone until done  Please be careful about your blood sugar level as go down the dose of prednisone   Please follow with 's recommendation for psycho referral   Will give ativan 1/2 tab every 12 hour PRN for your anxiety as of now

## 2020-07-29 NOTE — PROGRESS NOTES
[facility]    Attending Physician Statement:  Sara Dukes M.D., F.A.C.P. I have discussed the case, including pertinent history and exam findings with the resident. I have seen and examined the patient and the key elements of the encounter have been performed by me. I agree with the assessment, plan and orders as documented by the resident. Patient is seen for hospital fu visit today. Last office notes reviewed, relative labs and imaging. Discharge Diagnosis:  1. Abdominal pain of unclear origin w/ vomiting, nausea, and diarrhea  2. Intractable nausea nd vomiting 2/2 progressive gastroparesis vs. gastroenteritis vs. Autoimmune Colitis  3. Trichomoniasis -resolved  4. UTI vs asymptomatic bacteruria  5. Hyperlactatemia -resolved  6. CKD Stage 5 w/ diabetic nephropathy on peritoneal dialysis   7. DM Type 1 poorly controlled   8. HTN  9. HLD        10. Vitamin D deficiency  11. Hypothyroidism   12. Hx of anxiety      Hospital Course: The patient is a 33 y. o. female with a PMHx of DM type 1, HTN, gastroparesis, nephrolithiasis, anxiety, CKD stage 5 on PD started on 6/28/20, who presented with intractable vomiting, abdominal pain, fatigue, SOB, and decreased oral intake.     Patient stated that she began having abdominal pain and intractable nausea and vomiting 2 weeks ago. The pain was intermittent and she reported that she had 2 episodes per day, lasting around 1 hour. It was located in the lower abdomen and wrapped around to the right back. She described it as aching that occasionally became severe stabbing. There were no alleviating or exacerbating factors. The nausea and vomiting was constant and she has was unable to have much intake. Reported that she vomited immediately after trying to eat or drink something and the emesis looked like what she just had. Denied bilious or bloody appearing emesis.  She also c/o lightheadedness and SOB when she stood up. She Denied syncope. She has chronic diarrhea that is unchanged, denied hematochezia. She also noticed black spots in her right visual field the past few weeks. She stated that she has been diagnosed with diabetic retinopathy but has not had an eye exam recently. Does not think she is pregnant, she denies sexual contact for the last 6 months.     She notes that she had her peritoneal dialysis catheter inserted on 6/28. She had been undergoing hemodialysis before then. Also noted that her sugars have been fluctuating and ranged from 60 to 500 earlier.     She was seen in the [facility]'s ED on 7/14 and was diagnosed with a UTI and prescribed Cipro. She still complained of difficulty urinating and dysuria. Denied hematuria.   (but we noted +trich)    ED Course:She improved slightly, but still felt nauseous. Beta-hydroxybutyrate 0.7. Pro-BNP 4,696. Lactic acid 1.2. Troponin 0.06. Creatinine 5.3, otherwise BMP unremarkable. Alk phos 162. AST/ALT/bili WNL. Lipase 6. Glucose 609->42->187. Hgb 11.1 (baseline 8-9). WBC 11.2. UA: Moderate blood, neg nitrites, neg leuk esterase, >20 WBC, many bacteria. CXR: No acute infiltrate. ED Meds: Patient was given Zofran, Tigan. ED Fluids: Patient was given  mL/hr.     She was admitted to the Internal Medicine floor and worked up for the etiology of her abdominal pain including the possibility of nephrolithiasis, pancreatitis,diverticulitis, pregnancy, ovarian torsion, ectopic pregnancy, diabetic gastroparesis/gastroenteropathy, DKA, appendicitis, autoimmune colitis, UTI, and sexually transmitted disease. Her WBC were 11.2 on admission she was afebrile, denied chills/fever. Urine pregnancy test was negative. Peritoneal catheter was non erythematous and culture can back negative. A CT abdomen and retroperitoneal US were performed, which showed no acute intraabdominal process, and no acute pelvic process. Chest X-ray showed no acute infiltrate.  Urinalysis was positive for moderate blood, negative for nitrites, and negative for leukocyte esterase with many bacteria and >20WBC. She was found to have VRE E. Faecium on urine culture and started on Linezolid. Infectious disease did not recommend any antibiotic treatment for her asymptomatic bacteriuria as the VRE E. Faecium was thought to be due to contamination possibly from her diarrhea. The Linezolid was stopped. She was also found to have Trichomoniasis and given one dose of metronidazole 2,000mg. Her DM I is poorly controlled with a HbA1c 11.1. she was started on sliding scale insulin low dose which had to be increased to medium dose along with lantus 10U and Lispro 12 U. She began to get her appetite back on hospital day 2. Shortly after she began eating she began to complain of diarrhea, worsening abdominal pain, and painful defecation with several episodes throughout the day and night and loose, runny, stool. C diff testing came back negative. She was started on belladona suppository and methylprednisolone 40mg, which seemed to help with the rectal and abdominal pain significantly. On hospital day 4 her diarrhea had mostly subsided and her abdominal pain had significantly improved. She wanted to go home as she missed her kids and was saying she would sign out AMA we counseled her on the need to stay for a few more lab results and she was aggreable. On hospital day 5 she declined abdominal pain/ suprapubic pain and denied diarrhea. She had several episodes of crying and depressed mood during her stay and said she has been feeling down lately. Her home dose of ativan . 5mg was restarted which seemed to help but she continued to feel anxious during her hospital stay so we increased the dose to .5mg TID. She denied suicidal ideation or plan. She was counseled on what to do if feeling suicidal and what to do if her depressive symptoms continue.  She will continue the methylprednisolone 40mg and follow a steroid taper for 26 more days at home. She was found to be hypothyroid with TSH 12.140, T 36.73, and T4 .84 and was started on levothyroxine 50mcg, which will be continued at home. Nephrology, Infectious disease, and GI were consulted. Nephrology recommended continued peritoneal dialysis which she received while in the hospital as well as Bumex 1mg daily. Her lactic acid was found to be elevated during her hospital stay. It was initially 1.2 on 7/16 -->2.6 on  7/19 --> 2.9 on 7/20  -->1.9 ---> 2. This hyperlactatemia was likely due to contrast toxcity, or linezolid toxicity. The lactic acid improved after IV fluid administration. She was found to have a low vitamin D level of 12, Vitamin D was replaced. We were concerned for COVID 19 infection causing her GI symptoms but she declined the COVID 19 test several times. She says she was feeling well and was medically cleared for discharge. Today on fu-- feeling better than when she was in hospital  ESRD and   BP very high, labile- clonidine 0.1 prn at times when elevated-- getting peritoneal dialysis  Makes urine- only once daily -- ?bumex still on- ?efficiacious  (on lopressor - hx of recent cocaine noted, on cardizem-- off ACEused to be on)  I question if bacterial overgrowth- bc of diarrhea with meals, known gastroparesis from DM2 (recent linezolid)- ?abx assoc linezolid  8-11% reported-- on reglan    dm1- BS very labile-- on steroid taper- quicker steroid taper  Steroid trial was initiated in hospital for diarrhea, ABD pain--   culture growing VRE Feacsium (contamination possible). Was started on linezolid but was discontinued  linezolid also stopped,-- so not sure of etiology of diarrhea. +rectal pain- now improved      UTI symptoms better  urine culture 2/8/20 grew Klebsiella that was resistant to ampicillin and nitrofurantoin.    Also completed trich - flagyl treatment    , if wants benzos- then will need fu - psych-- controlled substance will be deferred from our

## 2020-07-31 ENCOUNTER — HOSPITAL ENCOUNTER (OUTPATIENT)
Age: 30
Discharge: HOME | End: 2020-08-02
Payer: COMMERCIAL

## 2020-07-31 LAB
ANION GAP SERPL CALCULATED.3IONS-SCNC: 17 MMOL/L (ref 7–16)
BUN BLDV-MCNC: 36 MG/DL (ref 6–20)
CALCIUM SERPL-MCNC: 8 MG/DL (ref 8.6–10.2)
CHLORIDE BLD-SCNC: 96 MMOL/L (ref 98–107)
CO2: 26 MMOL/L (ref 22–29)
CREAT SERPL-MCNC: 4.3 MG/DL (ref 0.5–1)
GFR AFRICAN AMERICAN: 15
GFR NON-AFRICAN AMERICAN: 15 ML/MIN/1.73
GLUCOSE BLD-MCNC: 248 MG/DL (ref 74–99)
HCT VFR BLD CALC: 37.9 % (ref 34–48)
HEMOGLOBIN: 12 G/DL (ref 11.5–15.5)
POTASSIUM SERPL-SCNC: 3.3 MMOL/L (ref 3.5–5)
SODIUM BLD-SCNC: 139 MMOL/L (ref 132–146)

## 2020-07-31 PROCEDURE — 85018 HEMOGLOBIN: CPT

## 2020-07-31 PROCEDURE — 85014 HEMATOCRIT: CPT

## 2020-07-31 PROCEDURE — 80048 BASIC METABOLIC PNL TOTAL CA: CPT

## 2020-08-10 ENCOUNTER — HOSPITAL ENCOUNTER (OUTPATIENT)
Dept: INFUSION THERAPY | Age: 30
Setting detail: INFUSION SERIES
Discharge: HOME | End: 2020-08-10
Payer: COMMERCIAL

## 2020-08-10 ENCOUNTER — APPOINTMENT (OUTPATIENT)
Dept: GENERAL RADIOLOGY | Age: 30
End: 2020-08-10
Payer: COMMERCIAL

## 2020-08-10 ENCOUNTER — HOSPITAL ENCOUNTER (EMERGENCY)
Age: 30
Discharge: HOME | End: 2020-08-10
Attending: EMERGENCY MEDICINE
Payer: COMMERCIAL

## 2020-08-10 VITALS
DIASTOLIC BLOOD PRESSURE: 74 MMHG | SYSTOLIC BLOOD PRESSURE: 121 MMHG | RESPIRATION RATE: 13 BRPM | HEART RATE: 78 BPM | OXYGEN SATURATION: 100 %

## 2020-08-10 VITALS
HEART RATE: 75 BPM | TEMPERATURE: 98.9 F | RESPIRATION RATE: 18 BRPM | DIASTOLIC BLOOD PRESSURE: 44 MMHG | SYSTOLIC BLOOD PRESSURE: 76 MMHG

## 2020-08-10 DIAGNOSIS — E88.09 HYPOALBUMINEMIA: Primary | ICD-10-CM

## 2020-08-10 LAB
ABO/RH: NORMAL
ALBUMIN SERPL-MCNC: 2.7 G/DL (ref 3.5–5.2)
ALP BLD-CCNC: 147 U/L (ref 35–104)
ALT SERPL-CCNC: 18 U/L (ref 0–32)
ANION GAP SERPL CALCULATED.3IONS-SCNC: 16 MMOL/L (ref 7–16)
ANISOCYTOSIS: ABNORMAL
ANTIBODY SCREEN: NORMAL
APTT: 28.3 SEC (ref 24.5–35.1)
AST SERPL-CCNC: 23 U/L (ref 0–31)
BASOPHILS ABSOLUTE: 0.02 E9/L (ref 0–0.2)
BASOPHILS RELATIVE PERCENT: 0.9 % (ref 0–2)
BETA-HYDROXYBUTYRATE: 0.18 MMOL/L (ref 0.02–0.27)
BILIRUB SERPL-MCNC: <0.2 MG/DL (ref 0–1.2)
BUN BLDV-MCNC: 33 MG/DL (ref 6–20)
CALCIUM SERPL-MCNC: 8 MG/DL (ref 8.6–10.2)
CHLORIDE BLD-SCNC: 89 MMOL/L (ref 98–107)
CO2: 26 MMOL/L (ref 22–29)
CREAT SERPL-MCNC: 6.5 MG/DL (ref 0.5–1)
EOSINOPHILS ABSOLUTE: 0.12 E9/L (ref 0.05–0.5)
EOSINOPHILS RELATIVE PERCENT: 7 % (ref 0–6)
GFR AFRICAN AMERICAN: 9
GFR NON-AFRICAN AMERICAN: 9 ML/MIN/1.73
GLUCOSE BLD-MCNC: 101 MG/DL (ref 74–99)
HCT VFR BLD CALC: 39.7 % (ref 34–48)
HEMOGLOBIN: 12.9 G/DL (ref 11.5–15.5)
INR BLD: 1
LACTIC ACID: 2.1 MMOL/L (ref 0.5–2.2)
LYMPHOCYTES ABSOLUTE: 1.12 E9/L (ref 1.5–4)
LYMPHOCYTES RELATIVE PERCENT: 66.1 % (ref 20–42)
MAGNESIUM: 1.6 MG/DL (ref 1.6–2.6)
MCH RBC QN AUTO: 30.5 PG (ref 26–35)
MCHC RBC AUTO-ENTMCNC: 32.5 % (ref 32–34.5)
MCV RBC AUTO: 93.9 FL (ref 80–99.9)
METAMYELOCYTES RELATIVE PERCENT: 1.7 % (ref 0–1)
MONOCYTES ABSOLUTE: 0.22 E9/L (ref 0.1–0.95)
MONOCYTES RELATIVE PERCENT: 13 % (ref 2–12)
NEUTROPHILS ABSOLUTE: 0.22 E9/L (ref 1.8–7.3)
NEUTROPHILS RELATIVE PERCENT: 11.3 % (ref 43–80)
NUCLEATED RED BLOOD CELLS: 0.9 /100 WBC
PDW BLD-RTO: 15.8 FL (ref 11.5–15)
PH VENOUS: 7.32 (ref 7.35–7.45)
PLATELET # BLD: 127 E9/L (ref 130–450)
PMV BLD AUTO: 10.1 FL (ref 7–12)
POIKILOCYTES: ABNORMAL
POLYCHROMASIA: ABNORMAL
POTASSIUM REFLEX MAGNESIUM: 3.3 MMOL/L (ref 3.5–5)
PRO-BNP: ABNORMAL PG/ML (ref 0–125)
PROTHROMBIN TIME: 11.7 SEC (ref 9.3–12.4)
RBC # BLD: 4.23 E12/L (ref 3.5–5.5)
SODIUM BLD-SCNC: 131 MMOL/L (ref 132–146)
TOTAL PROTEIN: 5.6 G/DL (ref 6.4–8.3)
TROPONIN: 0.04 NG/ML (ref 0–0.03)
WBC # BLD: 1.7 E9/L (ref 4.5–11.5)

## 2020-08-10 PROCEDURE — 85025 COMPLETE CBC W/AUTO DIFF WBC: CPT

## 2020-08-10 PROCEDURE — 85730 THROMBOPLASTIN TIME PARTIAL: CPT

## 2020-08-10 PROCEDURE — 85610 PROTHROMBIN TIME: CPT

## 2020-08-10 PROCEDURE — 86850 RBC ANTIBODY SCREEN: CPT

## 2020-08-10 PROCEDURE — 99201 HC NEW PT, E/M LEVEL 1: CPT

## 2020-08-10 PROCEDURE — 82800 BLOOD PH: CPT

## 2020-08-10 PROCEDURE — 83880 ASSAY OF NATRIURETIC PEPTIDE: CPT

## 2020-08-10 PROCEDURE — 84484 ASSAY OF TROPONIN QUANT: CPT

## 2020-08-10 PROCEDURE — 93005 ELECTROCARDIOGRAM TRACING: CPT | Performed by: NURSE PRACTITIONER

## 2020-08-10 PROCEDURE — 83605 ASSAY OF LACTIC ACID: CPT

## 2020-08-10 PROCEDURE — 83735 ASSAY OF MAGNESIUM: CPT

## 2020-08-10 PROCEDURE — 82010 KETONE BODYS QUAN: CPT

## 2020-08-10 PROCEDURE — 86900 BLOOD TYPING SEROLOGIC ABO: CPT

## 2020-08-10 PROCEDURE — 96361 HYDRATE IV INFUSION ADD-ON: CPT

## 2020-08-10 PROCEDURE — 96360 HYDRATION IV INFUSION INIT: CPT

## 2020-08-10 PROCEDURE — 71046 X-RAY EXAM CHEST 2 VIEWS: CPT

## 2020-08-10 PROCEDURE — 99281 EMR DPT VST MAYX REQ PHY/QHP: CPT

## 2020-08-10 PROCEDURE — 80053 COMPREHEN METABOLIC PANEL: CPT

## 2020-08-10 PROCEDURE — 2580000003 HC RX 258: Performed by: STUDENT IN AN ORGANIZED HEALTH CARE EDUCATION/TRAINING PROGRAM

## 2020-08-10 PROCEDURE — 86901 BLOOD TYPING SEROLOGIC RH(D): CPT

## 2020-08-10 PROCEDURE — 99282 EMERGENCY DEPT VISIT SF MDM: CPT

## 2020-08-10 RX ORDER — 0.9 % SODIUM CHLORIDE 0.9 %
1000 INTRAVENOUS SOLUTION INTRAVENOUS ONCE
Status: COMPLETED | OUTPATIENT
Start: 2020-08-10 | End: 2020-08-10

## 2020-08-10 RX ORDER — ALBUMIN (HUMAN) 12.5 G/50ML
25 SOLUTION INTRAVENOUS ONCE
Status: CANCELLED
Start: 2020-08-17

## 2020-08-10 RX ORDER — HEPARIN SODIUM (PORCINE) LOCK FLUSH IV SOLN 100 UNIT/ML 100 UNIT/ML
SOLUTION INTRAVENOUS
Status: DISCONTINUED
Start: 2020-08-10 | End: 2020-08-11 | Stop reason: HOSPADM

## 2020-08-10 RX ORDER — ACETAMINOPHEN 500 MG
1000 TABLET ORAL ONCE
Status: DISCONTINUED | OUTPATIENT
Start: 2020-08-10 | End: 2020-08-11 | Stop reason: HOSPADM

## 2020-08-10 RX ORDER — ALBUMIN (HUMAN) 12.5 G/50ML
25 SOLUTION INTRAVENOUS ONCE
Status: DISCONTINUED | OUTPATIENT
Start: 2020-08-10 | End: 2020-08-11 | Stop reason: HOSPADM

## 2020-08-10 RX ORDER — SODIUM CHLORIDE 0.9 % (FLUSH) 0.9 %
10 SYRINGE (ML) INJECTION PRN
Status: CANCELLED | OUTPATIENT
Start: 2020-08-17

## 2020-08-10 RX ORDER — SODIUM CHLORIDE 0.9 % (FLUSH) 0.9 %
10 SYRINGE (ML) INJECTION PRN
Status: DISCONTINUED | OUTPATIENT
Start: 2020-08-10 | End: 2020-08-11 | Stop reason: HOSPADM

## 2020-08-10 RX ADMIN — SODIUM CHLORIDE 1000 ML: 9 INJECTION, SOLUTION INTRAVENOUS at 20:42

## 2020-08-10 ASSESSMENT — PAIN SCALES - GENERAL: PAINLEVEL_OUTOF10: 9

## 2020-08-10 ASSESSMENT — PAIN DESCRIPTION - FREQUENCY: FREQUENCY: CONTINUOUS

## 2020-08-10 ASSESSMENT — PAIN DESCRIPTION - ORIENTATION: ORIENTATION: RIGHT;LEFT

## 2020-08-10 ASSESSMENT — PAIN DESCRIPTION - PAIN TYPE: TYPE: ACUTE PAIN

## 2020-08-10 ASSESSMENT — PAIN DESCRIPTION - LOCATION: LOCATION: EAR

## 2020-08-10 ASSESSMENT — PAIN DESCRIPTION - ONSET: ONSET: ON-GOING

## 2020-08-10 ASSESSMENT — PAIN DESCRIPTION - DESCRIPTORS: DESCRIPTORS: ACHING

## 2020-08-10 NOTE — ED NOTES
Bed: HD  Expected date:   Expected time:   Means of arrival:   Comments:  triage     Kimberly Gonzalez RN  08/10/20 [id number]

## 2020-08-10 NOTE — ED NOTES
Patient has Power port and prefers to have blood taken from it.   Unable to given urine at this time patient is on peritoneal dialysis      Harshad Campbell LPN  03/16/71 [id number]

## 2020-08-10 NOTE — PROGRESS NOTES
Patient to infusion services for Albumin infusion. Pt arrived complaining of \"terrible ear pain and neck pain\". She also stated that she has a headache, has been short of breath, and has been very tired and fatigued. States symptoms have been since this morning. Blood pressure upon arrival was 76/44. Infusion was not given at this time. Spoke to [employer] Mad[employer] at Dr. Tiffany Hopper office. Office states to call answering service to notify Dr. Irene Chaney. Awaiting call back.

## 2020-08-11 ENCOUNTER — CARE COORDINATION (OUTPATIENT)
Dept: CARE COORDINATION | Age: 30
End: 2020-08-11

## 2020-08-11 LAB
EKG ATRIAL RATE: 70 BPM
EKG P AXIS: 70 DEGREES
EKG P-R INTERVAL: 108 MS
EKG Q-T INTERVAL: 442 MS
EKG QRS DURATION: 94 MS
EKG QTC CALCULATION (BAZETT): 477 MS
EKG R AXIS: 55 DEGREES
EKG T AXIS: 57 DEGREES
EKG VENTRICULAR RATE: 70 BPM

## 2020-08-11 PROCEDURE — 93010 ELECTROCARDIOGRAM REPORT: CPT | Performed by: INTERNAL MEDICINE

## 2020-08-11 ASSESSMENT — ENCOUNTER SYMPTOMS
DIARRHEA: 0
SHORTNESS OF BREATH: 0
BACK PAIN: 1
FACIAL SWELLING: 0
CONSTIPATION: 0
COUGH: 0
RECTAL PAIN: 0
ABDOMINAL PAIN: 0
VOMITING: 0
PHOTOPHOBIA: 0
RHINORRHEA: 0

## 2020-08-11 NOTE — CARE COORDINATION
Left First message for patient to return call re: ED Follow-up for Back pain, dehydration, ear fullness, low bp.   Needs PCP appt [phone]  At Risk COVID-19   Plan to offer Loop Enrollment  Patient has contact information

## 2020-08-11 NOTE — ED PROVIDER NOTES
HISTORY ---------------------------------------------  Past Medical History:  has a past medical history of Acute congestive heart failure ([facility] BC (acute kidney injury) ([facility] Cephalgia, Chronic kidney disease, Depression, Diabetes mellitus ([facility] Diabetic ketoacidosis ([facility] Hemodialysis patient ([facility] History of blood transfusion, Iron deficiency anemia, MDRO (multiple drug resistant organisms) resistance, MRSA (methicillin resistant Staphylococcus aureus), Non compliance w medication regimen, Other disorders of kidney and ureter, Pregnancy, and Severe pre-eclampsia in third trimester. Past Surgical History:  has a past surgical history that includes ECHO Compl W Dop Color Flow (2012); ECHO Compl W Dop Color Flow (6/10/2013);  section; Tunneled venous port placement (2018); pr esophagogastroduodenoscopy transoral diagnostic (N/A, 2018); back surgery; Colonoscopy (N/A, 2018); Colonoscopy (N/A, 2018); Upper gastrointestinal endoscopy (2018); Upper gastrointestinal endoscopy (N/A, 10/11/2019); Cholecystectomy, laparoscopic (N/A, 2019); and LAPAROSCOPY INSERTION PERITONEAL CATHETER (N/A, 2020). Social History:  reports that she has been smoking cigarettes. She has a 3.00 pack-year smoking history. She uses smokeless tobacco. She reports that she does not drink alcohol or use drugs. Family History: family history includes Asthma in her brother and mother; Diabetes in her mother; High Blood Pressure in her father and mother; Hypertension in her mother. The patients home medications have been reviewed. Allergies:  Toradol [ketorolac tromethamine]    -------------------------------------------------- RESULTS -------------------------------------------------  Labs:  Results for orders placed or performed during the hospital encounter of 08/10/20   CBC Auto Differential   Result Value Ref Range    WBC 1.7 (L) 4.5 - 11.5 E9/L    RBC 4.23 3.50 - 5.50 E12/L Hemoglobin 12.9 11.5 - 15.5 g/dL    Hematocrit 39.7 34.0 - 48.0 %    MCV 93.9 80.0 - 99.9 fL    MCH 30.5 26.0 - 35.0 pg    MCHC 32.5 32.0 - 34.5 %    RDW 15.8 (H) 11.5 - 15.0 fL    Platelets 970 (L) 558 - 450 E9/L    MPV 10.1 7.0 - 12.0 fL    Neutrophils % 11.3 (L) 43.0 - 80.0 %    Lymphocytes % 66.1 (H) 20.0 - 42.0 %    Monocytes % 13.0 (H) 2.0 - 12.0 %    Eosinophils % 7.0 (H) 0.0 - 6.0 %    Basophils % 0.9 0.0 - 2.0 %    Neutrophils Absolute 0.22 (L) 1.80 - 7.30 E9/L    Lymphocytes Absolute 1.12 (L) 1.50 - 4.00 E9/L    Monocytes Absolute 0.22 0.10 - 0.95 E9/L    Eosinophils Absolute 0.12 0.05 - 0.50 E9/L    Basophils Absolute 0.02 0.00 - 0.20 E9/L    Metamyelocytes Relative 1.7 (H) 0.0 - 1.0 %    nRBC 0.9 /100 WBC    Anisocytosis 2+     Polychromasia 1+     Poikilocytes 1+    Comprehensive Metabolic Panel w/ Reflex to MG   Result Value Ref Range    Sodium 131 (L) 132 - 146 mmol/L    Potassium reflex Magnesium 3.3 (L) 3.5 - 5.0 mmol/L    Chloride 89 (L) 98 - 107 mmol/L    CO2 26 22 - 29 mmol/L    Anion Gap 16 7 - 16 mmol/L    Glucose 101 (H) 74 - 99 mg/dL    BUN 33 (H) 6 - 20 mg/dL    CREATININE 6.5 (H) 0.5 - 1.0 mg/dL    GFR Non-African American 9 >=60 mL/min/1.73    GFR African American 9     Calcium 8.0 (L) 8.6 - 10.2 mg/dL    Total Protein 5.6 (L) 6.4 - 8.3 g/dL    Alb 2.7 (L) 3.5 - 5.2 g/dL    Total Bilirubin <0.2 0.0 - 1.2 mg/dL    Alkaline Phosphatase 147 (H) 35 - 104 U/L    ALT 18 0 - 32 U/L    AST 23 0 - 31 U/L   Troponin   Result Value Ref Range    Troponin 0.04 (H) 0.00 - 0.03 ng/mL   Brain Natriuretic Peptide   Result Value Ref Range    Pro-BNP 18,603 (H) 0 - 125 pg/mL   Protime-INR   Result Value Ref Range    Protime 11.7 9.3 - 12.4 sec    INR 1.0    APTT   Result Value Ref Range    aPTT 28.3 24.5 - 35.1 sec   Lactic Acid, Plasma   Result Value Ref Range    Lactic Acid 2.1 0.5 - 2.2 mmol/L   pH, venous   Result Value Ref Range    pH, Khurram 7.32 (L) 7.35 - 7.45   Beta-Hydroxybutyrate   Result Value Ref Range    Beta-Hydroxybutyrate 0.18 0.02 - 0.27 mmol/L   Magnesium   Result Value Ref Range    Magnesium 1.6 1.6 - 2.6 mg/dL   EKG 12 Lead   Result Value Ref Range    Ventricular Rate 70 BPM    Atrial Rate 70 BPM    P-R Interval 108 ms    QRS Duration 94 ms    Q-T Interval 442 ms    QTc Calculation (Bazett) 477 ms    P Axis 70 degrees    R Axis 55 degrees    T Axis 57 degrees   TYPE AND SCREEN   Result Value Ref Range    ABO/Rh O POS     Antibody Screen NEG        Radiology:  XR CHEST (2 VW)   Final Result   No acute cardiopulmonary findings. EKG:  This EKG is signed and interpreted by me. Rate: 70  Rhythm: Sinus  Interpretation: no acute changes  Comparison: was normal     ------------------------- NURSING NOTES AND VITALS REVIEWED ---------------------------  Date / Time Roomed:  8/10/2020  7:06 PM  ED Bed Assignment:  [facility] D/    The nursing notes within the ED encounter and vital signs as below have been reviewed. /74   Pulse 78   Resp 13   LMP 07/17/2020 (Exact Date)   SpO2 100%   Oxygen Saturation Interpretation: Normal      ------------------------------------------ PROGRESS NOTES ------------------------------------------  3:40 AM EDT  I have spoken with the patient and discussed todays results, in addition to providing specific details for the plan of care and counseling regarding the diagnosis and prognosis. Their questions are answered at this time and they are agreeable with the plan. I discussed at length with them reasons for immediate return here for re evaluation. They will followup with their primary care physician by calling their office tomorrow. --------------------------------- ADDITIONAL PROVIDER NOTES ---------------------------------  At this time the patient is without objective evidence of an acute process requiring hospitalization or inpatient management.   They have remained hemodynamically stable throughout their entire ED visit and are stable for discharge with outpatient follow-up. The plan has been discussed in detail and they are aware of the specific conditions for emergent return, as well as the importance of follow-up. Discharge Medication List as of 8/10/2020 11:18 PM          Diagnosis:  1. Dehydration    2. Otalgia of both ears        Disposition:  Patient's disposition: Discharge to home  Patient's condition is stable. 8/11/20, 3:40 AM EDT.     This note is prepared by Yoel Bhardwaj MD -PGY-1           Yoel Bhardwaj MD  Resident  08/13/20 [id number]

## 2020-08-12 NOTE — CARE COORDINATION
Patient contacted regarding recent discharge and COVID-19 risk. Discussed COVID-19 related testing which was not done at this time. Test results were not done. Patient informed of results, if available? No     Care Transition Nurse/ Ambulatory Care Manager contacted the patient by telephone to perform post discharge assessment. Verified name and  with patient as identifiers. Patient has following risk factors of: diabetes and chronic kidney disease. CTN/ACM reviewed discharge instructions, medical action plan and red flags related to discharge diagnosis. Reviewed and educated them on any new and changed medications related to discharge diagnosis. Advised obtaining a 90-day supply of all daily and as-needed medications. Education provided regarding infection prevention, and signs and symptoms of COVID-19 and when to seek medical attention with patient who verbalized understanding. Discussed exposure protocols and quarantine from [address] you at higher risk for severe illness  and given an opportunity for questions and concerns. The patient agrees to contact the COVID-19 hotline [phone] or PCP office for questions related to their healthcare. CTN/ACM provided contact information for future reference. From CDC: Are you at higher risk for severe illness?  Wash your hands often.  Avoid close contact (6 feet, which is about two arm lengths) with people who are sick.  Put distance between yourself and other people if COVID-19 is spreading in your community.  Clean and disinfect frequently touched surfaces.  Avoid all cruise travel and non-essential air travel.  Call your healthcare professional if you have concerns about COVID-19 and your underlying condition or if you are sick.     For more information on steps you can take to protect yourself, see CDC's How to Protect Yourself    Pt will be further monitored by COVID Loop Team based on severity of symptoms and risk factors.   Patient: Pt states there are new or worsening symptoms  Prescribed Medications: N/A  MyChart: Pt is active with MyChart  Follow up Appointment: Patient has follow up appointment scheduled  ACP: Medical Decision Maker(s) confirmed  Care Coordination: Care Coordination Services explained to Patient  Patient declines Care Coordination Services at this time and Patient Screening section is completed

## 2020-08-13 ENCOUNTER — HOSPITAL ENCOUNTER (OUTPATIENT)
Age: 30
Discharge: HOME | End: 2020-08-15
Payer: COMMERCIAL

## 2020-08-13 ENCOUNTER — HOSPITAL ENCOUNTER (OUTPATIENT)
Dept: GENERAL RADIOLOGY | Age: 30
Discharge: HOME | End: 2020-08-15
Payer: COMMERCIAL

## 2020-08-13 PROCEDURE — 73502 X-RAY EXAM HIP UNI 2-3 VIEWS: CPT

## 2020-08-17 ENCOUNTER — HOSPITAL ENCOUNTER (OUTPATIENT)
Dept: INFUSION THERAPY | Age: 30
Setting detail: INFUSION SERIES
Discharge: HOME | End: 2020-08-17
Payer: COMMERCIAL

## 2020-08-17 VITALS
HEART RATE: 105 BPM | TEMPERATURE: 99.1 F | SYSTOLIC BLOOD PRESSURE: 89 MMHG | RESPIRATION RATE: 18 BRPM | DIASTOLIC BLOOD PRESSURE: 54 MMHG

## 2020-08-17 DIAGNOSIS — E88.09 HYPOALBUMINEMIA: Primary | ICD-10-CM

## 2020-08-17 PROCEDURE — 6360000002 HC RX W HCPCS

## 2020-08-17 PROCEDURE — 6360000002 HC RX W HCPCS: Performed by: INTERNAL MEDICINE

## 2020-08-17 PROCEDURE — 2580000003 HC RX 258

## 2020-08-17 PROCEDURE — 96365 THER/PROPH/DIAG IV INF INIT: CPT

## 2020-08-17 PROCEDURE — 2580000003 HC RX 258: Performed by: INTERNAL MEDICINE

## 2020-08-17 PROCEDURE — P9047 ALBUMIN (HUMAN), 25%, 50ML: HCPCS | Performed by: INTERNAL MEDICINE

## 2020-08-17 RX ORDER — ALBUMIN (HUMAN) 12.5 G/50ML
25 SOLUTION INTRAVENOUS ONCE
Status: CANCELLED
Start: 2020-08-24

## 2020-08-17 RX ORDER — SODIUM CHLORIDE 0.9 % (FLUSH) 0.9 %
10 SYRINGE (ML) INJECTION PRN
Status: DISCONTINUED | OUTPATIENT
Start: 2020-08-17 | End: 2020-08-18 | Stop reason: HOSPADM

## 2020-08-17 RX ORDER — HEPARIN SODIUM (PORCINE) LOCK FLUSH IV SOLN 100 UNIT/ML 100 UNIT/ML
SOLUTION INTRAVENOUS
Status: COMPLETED
Start: 2020-08-17 | End: 2020-08-17

## 2020-08-17 RX ORDER — SODIUM CHLORIDE 0.9 % (FLUSH) 0.9 %
10 SYRINGE (ML) INJECTION PRN
Status: CANCELLED | OUTPATIENT
Start: 2020-08-24

## 2020-08-17 RX ORDER — HEPARIN SODIUM (PORCINE) LOCK FLUSH IV SOLN 100 UNIT/ML 100 UNIT/ML
SOLUTION INTRAVENOUS
Status: DISCONTINUED
Start: 2020-08-17 | End: 2020-08-18 | Stop reason: HOSPADM

## 2020-08-17 RX ORDER — SODIUM CHLORIDE 0.9 % (FLUSH) 0.9 %
SYRINGE (ML) INJECTION
Status: COMPLETED
Start: 2020-08-17 | End: 2020-08-17

## 2020-08-17 RX ORDER — ALBUMIN (HUMAN) 12.5 G/50ML
25 SOLUTION INTRAVENOUS ONCE
Status: COMPLETED | OUTPATIENT
Start: 2020-08-17 | End: 2020-08-17

## 2020-08-17 RX ADMIN — SODIUM CHLORIDE, PRESERVATIVE FREE 10 ML: 5 INJECTION INTRAVENOUS at 13:30

## 2020-08-17 RX ADMIN — SODIUM CHLORIDE, PRESERVATIVE FREE 10 ML: 5 INJECTION INTRAVENOUS at 14:49

## 2020-08-17 RX ADMIN — ALBUMIN (HUMAN) 25 G: 0.25 INJECTION, SOLUTION INTRAVENOUS at 13:41

## 2020-08-17 RX ADMIN — HEPARIN SODIUM (PORCINE) LOCK FLUSH IV SOLN 100 UNIT/ML 500 UNITS: 100 SOLUTION at 14:49

## 2020-08-24 ENCOUNTER — HOSPITAL ENCOUNTER (OUTPATIENT)
Dept: INFUSION THERAPY | Age: 30
Setting detail: INFUSION SERIES
Discharge: HOME | End: 2020-08-24
Payer: COMMERCIAL

## 2020-08-24 VITALS
RESPIRATION RATE: 18 BRPM | HEART RATE: 99 BPM | DIASTOLIC BLOOD PRESSURE: 80 MMHG | SYSTOLIC BLOOD PRESSURE: 106 MMHG | TEMPERATURE: 97.6 F

## 2020-08-24 DIAGNOSIS — E88.09 HYPOALBUMINEMIA: Primary | ICD-10-CM

## 2020-08-24 PROCEDURE — 96365 THER/PROPH/DIAG IV INF INIT: CPT

## 2020-08-24 PROCEDURE — 6360000002 HC RX W HCPCS: Performed by: INTERNAL MEDICINE

## 2020-08-24 PROCEDURE — P9047 ALBUMIN (HUMAN), 25%, 50ML: HCPCS | Performed by: INTERNAL MEDICINE

## 2020-08-24 PROCEDURE — 6360000002 HC RX W HCPCS

## 2020-08-24 PROCEDURE — 2580000003 HC RX 258: Performed by: INTERNAL MEDICINE

## 2020-08-24 RX ORDER — HEPARIN SODIUM (PORCINE) LOCK FLUSH IV SOLN 100 UNIT/ML 100 UNIT/ML
SOLUTION INTRAVENOUS
Status: COMPLETED
Start: 2020-08-24 | End: 2020-08-24

## 2020-08-24 RX ORDER — ALBUMIN (HUMAN) 12.5 G/50ML
25 SOLUTION INTRAVENOUS ONCE
Status: CANCELLED
Start: 2020-08-31

## 2020-08-24 RX ORDER — ALBUMIN (HUMAN) 12.5 G/50ML
25 SOLUTION INTRAVENOUS ONCE
Status: COMPLETED | OUTPATIENT
Start: 2020-08-24 | End: 2020-08-24

## 2020-08-24 RX ORDER — SODIUM CHLORIDE 0.9 % (FLUSH) 0.9 %
10 SYRINGE (ML) INJECTION PRN
Status: DISCONTINUED | OUTPATIENT
Start: 2020-08-24 | End: 2020-08-25 | Stop reason: HOSPADM

## 2020-08-24 RX ORDER — SODIUM CHLORIDE 0.9 % (FLUSH) 0.9 %
10 SYRINGE (ML) INJECTION PRN
Status: CANCELLED | OUTPATIENT
Start: 2020-08-31

## 2020-08-24 RX ADMIN — HEPARIN SODIUM (PORCINE) LOCK FLUSH IV SOLN 100 UNIT/ML 500 UNITS: 100 SOLUTION at 14:26

## 2020-08-24 RX ADMIN — HEPARIN SODIUM (PORCINE) LOCK FLUSH IV SOLN 100 UNIT/ML 500 UNITS: 100 SOLUTION at 14:21

## 2020-08-24 RX ADMIN — SODIUM CHLORIDE, PRESERVATIVE FREE 10 ML: 5 INJECTION INTRAVENOUS at 12:55

## 2020-08-24 RX ADMIN — ALBUMIN (HUMAN) 25 G: 0.25 INJECTION, SOLUTION INTRAVENOUS at 13:00

## 2020-08-24 RX ADMIN — SODIUM CHLORIDE, PRESERVATIVE FREE 10 ML: 5 INJECTION INTRAVENOUS at 14:22

## 2020-08-24 RX ADMIN — SODIUM CHLORIDE, PRESERVATIVE FREE 10 ML: 5 INJECTION INTRAVENOUS at 14:26

## 2020-08-26 ENCOUNTER — HOSPITAL ENCOUNTER (OUTPATIENT)
Age: 30
Discharge: HOME | End: 2020-08-28
Payer: COMMERCIAL

## 2020-08-26 LAB
ANION GAP SERPL CALCULATED.3IONS-SCNC: 19 MMOL/L (ref 7–16)
BACTERIA: ABNORMAL /HPF
BILIRUBIN URINE: NEGATIVE
BLOOD, URINE: ABNORMAL
BUN BLDV-MCNC: 50 MG/DL (ref 6–20)
CALCIUM SERPL-MCNC: 8.3 MG/DL (ref 8.6–10.2)
CHLORIDE BLD-SCNC: 96 MMOL/L (ref 98–107)
CLARITY: ABNORMAL
CO2: 22 MMOL/L (ref 22–29)
COLOR: YELLOW
CREAT SERPL-MCNC: 7.1 MG/DL (ref 0.5–1)
GFR AFRICAN AMERICAN: 8
GFR NON-AFRICAN AMERICAN: 8 ML/MIN/1.73
GLUCOSE BLD-MCNC: 233 MG/DL (ref 74–99)
GLUCOSE URINE: >=1000 MG/DL
KETONES, URINE: NEGATIVE MG/DL
LEUKOCYTE ESTERASE, URINE: ABNORMAL
NITRITE, URINE: NEGATIVE
PH UA: 5.5 (ref 5–9)
POTASSIUM SERPL-SCNC: 3.3 MMOL/L (ref 3.5–5)
PROTEIN UA: >=300 MG/DL
RBC UA: ABNORMAL /HPF (ref 0–2)
SODIUM BLD-SCNC: 137 MMOL/L (ref 132–146)
SPECIFIC GRAVITY UA: 1.02 (ref 1–1.03)
UROBILINOGEN, URINE: 0.2 E.U./DL
WBC UA: ABNORMAL /HPF (ref 0–5)

## 2020-08-26 PROCEDURE — 80048 BASIC METABOLIC PNL TOTAL CA: CPT

## 2020-08-26 PROCEDURE — 81001 URINALYSIS AUTO W/SCOPE: CPT

## 2020-08-26 PROCEDURE — 87088 URINE BACTERIA CULTURE: CPT

## 2020-08-28 LAB — URINE CULTURE, ROUTINE: NORMAL

## 2020-08-31 ENCOUNTER — HOSPITAL ENCOUNTER (OUTPATIENT)
Dept: INFUSION THERAPY | Age: 30
Setting detail: INFUSION SERIES
Discharge: HOME | End: 2020-08-31
Payer: COMMERCIAL

## 2020-08-31 VITALS
TEMPERATURE: 97 F | RESPIRATION RATE: 18 BRPM | HEART RATE: 92 BPM | SYSTOLIC BLOOD PRESSURE: 111 MMHG | DIASTOLIC BLOOD PRESSURE: 73 MMHG | OXYGEN SATURATION: 98 %

## 2020-08-31 DIAGNOSIS — E88.09 HYPOALBUMINEMIA: Primary | ICD-10-CM

## 2020-08-31 PROCEDURE — 2580000003 HC RX 258: Performed by: INTERNAL MEDICINE

## 2020-08-31 PROCEDURE — P9047 ALBUMIN (HUMAN), 25%, 50ML: HCPCS | Performed by: INTERNAL MEDICINE

## 2020-08-31 PROCEDURE — 6360000002 HC RX W HCPCS: Performed by: INTERNAL MEDICINE

## 2020-08-31 PROCEDURE — 96365 THER/PROPH/DIAG IV INF INIT: CPT

## 2020-08-31 RX ORDER — SODIUM CHLORIDE 0.9 % (FLUSH) 0.9 %
10 SYRINGE (ML) INJECTION PRN
Status: DISCONTINUED | OUTPATIENT
Start: 2020-08-31 | End: 2020-09-01 | Stop reason: HOSPADM

## 2020-08-31 RX ORDER — ALBUMIN (HUMAN) 12.5 G/50ML
25 SOLUTION INTRAVENOUS ONCE
Status: CANCELLED
Start: 2020-08-31

## 2020-08-31 RX ORDER — ALBUMIN (HUMAN) 12.5 G/50ML
25 SOLUTION INTRAVENOUS ONCE
Status: COMPLETED | OUTPATIENT
Start: 2020-08-31 | End: 2020-08-31

## 2020-08-31 RX ORDER — SODIUM CHLORIDE 0.9 % (FLUSH) 0.9 %
10 SYRINGE (ML) INJECTION PRN
Status: CANCELLED | OUTPATIENT
Start: 2020-08-31

## 2020-08-31 RX ADMIN — SODIUM CHLORIDE, PRESERVATIVE FREE 10 ML: 5 INJECTION INTRAVENOUS at 14:20

## 2020-08-31 RX ADMIN — ALBUMIN (HUMAN) 25 G: 0.25 INJECTION, SOLUTION INTRAVENOUS at 13:12

## 2020-09-02 ENCOUNTER — TELEPHONE (OUTPATIENT)
Dept: INTERNAL MEDICINE | Age: 30
End: 2020-09-02

## 2020-09-03 ENCOUNTER — OFFICE VISIT (OUTPATIENT)
Dept: INTERNAL MEDICINE | Age: 30
End: 2020-09-03
Payer: COMMERCIAL

## 2020-09-03 VITALS
WEIGHT: 129 LBS | TEMPERATURE: 97.9 F | HEIGHT: 64 IN | RESPIRATION RATE: 16 BRPM | OXYGEN SATURATION: 100 % | HEART RATE: 110 BPM | DIASTOLIC BLOOD PRESSURE: 93 MMHG | BODY MASS INDEX: 22.02 KG/M2 | SYSTOLIC BLOOD PRESSURE: 138 MMHG

## 2020-09-03 PROCEDURE — 3052F HG A1C>EQUAL 8.0%<EQUAL 9.0%: CPT | Performed by: INTERNAL MEDICINE

## 2020-09-03 PROCEDURE — 99213 OFFICE O/P EST LOW 20 MIN: CPT | Performed by: INTERNAL MEDICINE

## 2020-09-03 PROCEDURE — 2022F DILAT RTA XM EVC RTNOPTHY: CPT | Performed by: INTERNAL MEDICINE

## 2020-09-03 PROCEDURE — 99212 OFFICE O/P EST SF 10 MIN: CPT | Performed by: INTERNAL MEDICINE

## 2020-09-03 PROCEDURE — 4004F PT TOBACCO SCREEN RCVD TLK: CPT | Performed by: INTERNAL MEDICINE

## 2020-09-03 PROCEDURE — G8420 CALC BMI NORM PARAMETERS: HCPCS | Performed by: INTERNAL MEDICINE

## 2020-09-03 PROCEDURE — G8427 DOCREV CUR MEDS BY ELIG CLIN: HCPCS | Performed by: INTERNAL MEDICINE

## 2020-09-03 RX ORDER — DILTIAZEM HYDROCHLORIDE 240 MG/1
240 CAPSULE, COATED, EXTENDED RELEASE ORAL DAILY
Qty: 30 CAPSULE | Refills: 2 | Status: SHIPPED
Start: 2020-09-03 | End: 2020-12-15 | Stop reason: SDUPTHER

## 2020-09-03 RX ORDER — LISINOPRIL 20 MG/1
20 TABLET ORAL DAILY
Qty: 30 TABLET | Refills: 5 | Status: ON HOLD
Start: 2020-09-03 | End: 2020-10-24 | Stop reason: HOSPADM

## 2020-09-03 RX ORDER — METOPROLOL TARTRATE 100 MG/1
100 TABLET ORAL 2 TIMES DAILY
Qty: 60 TABLET | Refills: 2 | Status: ON HOLD
Start: 2020-09-03 | End: 2020-10-24 | Stop reason: HOSPADM

## 2020-09-03 RX ORDER — ERGOCALCIFEROL 1.25 MG/1
50000 CAPSULE ORAL WEEKLY
Qty: 5 CAPSULE | Refills: 2 | Status: SHIPPED
Start: 2020-09-03 | End: 2020-10-05 | Stop reason: ALTCHOICE

## 2020-09-03 RX ORDER — POTASSIUM CHLORIDE 1.5 G/1.77G
20 POWDER, FOR SOLUTION ORAL DAILY
Qty: 30 EACH | Refills: 0 | Status: ON HOLD
Start: 2020-09-03 | End: 2020-10-24 | Stop reason: HOSPADM

## 2020-09-03 RX ORDER — FERRIC CITRATE 210 MG/1
1 TABLET, COATED ORAL
Status: ON HOLD | COMMUNITY
Start: 2020-09-03 | End: 2020-10-24 | Stop reason: HOSPADM

## 2020-09-03 RX ORDER — POTASSIUM CHLORIDE 1.5 G/1.77G
20 POWDER, FOR SOLUTION ORAL 2 TIMES DAILY
COMMUNITY
End: 2020-09-03 | Stop reason: SDUPTHER

## 2020-09-03 RX ORDER — INSULIN GLARGINE 100 [IU]/ML
10 INJECTION, SOLUTION SUBCUTANEOUS 2 TIMES DAILY
Qty: 5 PEN | Refills: 5 | Status: SHIPPED
Start: 2020-09-03 | End: 2020-09-08 | Stop reason: ALTCHOICE

## 2020-09-03 RX ORDER — LEVOTHYROXINE SODIUM 0.05 MG/1
50 TABLET ORAL DAILY
Qty: 30 TABLET | Refills: 5 | Status: ON HOLD
Start: 2020-09-03 | End: 2020-11-11 | Stop reason: HOSPADM

## 2020-09-03 RX ORDER — AMOXICILLIN 500 MG/1
500 CAPSULE ORAL 2 TIMES DAILY
COMMUNITY
End: 2020-09-03 | Stop reason: ALTCHOICE

## 2020-09-03 RX ORDER — METOCLOPRAMIDE 5 MG/1
5 TABLET ORAL
Qty: 120 TABLET | Refills: 3 | Status: ON HOLD
Start: 2020-09-03 | End: 2020-10-24 | Stop reason: HOSPADM

## 2020-09-03 RX ORDER — ATORVASTATIN CALCIUM 40 MG/1
40 TABLET, FILM COATED ORAL NIGHTLY
Qty: 30 TABLET | Refills: 5 | Status: SHIPPED
Start: 2020-09-03 | End: 2021-04-19

## 2020-09-03 RX ORDER — METOLAZONE 2.5 MG/1
2.5 TABLET ORAL DAILY
COMMUNITY
End: 2020-10-05 | Stop reason: DRUGHIGH

## 2020-09-03 RX ORDER — BUMETANIDE 1 MG/1
1 TABLET ORAL DAILY
Qty: 30 TABLET | Refills: 2 | Status: SHIPPED
Start: 2020-09-03 | End: 2020-10-05 | Stop reason: DRUGHIGH

## 2020-09-03 NOTE — PROGRESS NOTES
Discharge instructions reviewed with patient per Dr. Sammie Jacome. Patient directed to  to  AVS and schedule follow up appt.

## 2020-09-03 NOTE — PROGRESS NOTES
[facility]  Internal Medicine Residency Program  [facility] Note      SUBJECTIVE:  CC: had concerns including Check-Up (1 month f/u) and Medication Refill. Regine Jeter presented to the [facility] for a routine visit  32 yo F hx of IDDM1, hypertension, ESRD on PD started on 6/28(peritoneal dialysis catheter inserted)  Recent ER  Visit on 8/10 for hypotension 70s/40s, clonidine held. ?dehydration vs orthostatic hypotension   Hospitalized at 7/20 for abd pain concerning for Steroid as trial for possible- UC, crohns, microscopic colitis, eosinophilic gastroenteritis   Seen in the [facility] ED on 7/14 and was diagnosed with a UTI and prescribed Cipro  Still c/o intermittent dysuria. Amoxicillin started 9/2, 2 urination a day for 5 days   Hx of UTI feb klebsiella: urine culture 2/8/20 grew Klebsiella that was resistant to ampicillin and nitrofurantoin. Review Of Systems:  General: no fevers, chills, weight loss or gain.    Ears/Nose/Throat: no hearing loss, tinnitus, vertigo, nosebleed, nasal congestion, rhinorrhea, sore throat  Respiratory: no cough, pleuritic chest pain, dyspnea, or wheezing  Cardiovascular: no chest pain, angina, dyspnea on exertion, orthopnea, PND, palpitations, or claudication  Gastrointestinal: no nausea, vomiting, heartburn, diarrhea, constipation, abdominal pain, hematochezia or melena  Genitourinary: no urinary urgency, frequency, dysuria, nocturia, hesitancy, or incontinence  Musculoskeletal: no arthritis, arthralgia, myalgia, weakness, or morning stiffness  Skin: no abnormal pigmentation, rash, itching, masses, hair or nail changes    Outpatient Medications Marked as Taking for the 9/3/20 encounter (Office Visit) with Roberto Roberts MD   Medication Sig Dispense Refill    metOLazone (ZAROXOLYN) 2.5 MG tablet Take 2.5 mg by mouth daily      Ferric Citrate (AURYXIA) 1  MG(Fe) TABS Take by mouth 3 times daily Take 3 times a day w/ meals      potassium chloride (KLOR-CON) 20 MEQ packet Take 20 mEq by mouth 2 times daily      amoxicillin (AMOXIL) 500 MG capsule Take 500 mg by mouth 2 times daily      lisinopril (PRINIVIL;ZESTRIL) 20 MG tablet Take 1 tablet by mouth daily 30 tablet 2    levothyroxine (SYNTHROID) 50 MCG tablet Take 1 tablet by mouth Daily 30 tablet 3    metoclopramide (REGLAN) 5 MG tablet Take 1 tablet by mouth 3 times daily (with meals) 120 tablet 3    atorvastatin (LIPITOR) 40 MG tablet Take 1 tablet by mouth nightly 30 tablet 0    metoprolol (LOPRESSOR) 100 MG tablet Take 1 tablet by mouth 2 times daily 60 tablet 0    dilTIAZem (CARDIZEM CD) 240 MG extended release capsule Take 1 capsule by mouth daily 30 capsule 3    vitamin D (ERGOCALCIFEROL) 1.25 MG (24295 UT) CAPS capsule Take 1 capsule by mouth once a week 5 capsule 0    bumetanide (BUMEX) 1 MG tablet Take 1 tablet by mouth daily 30 tablet 3    epoetin nena-epbx (RETACRIT) 4000 UNIT/ML SOLN injection Inject 2 mLs into the skin three times a week Per Nephrology 16.5 mL 0    insulin glargine (BASAGLAR KWIKPEN) 100 UNIT/ML injection pen Inject 10 Units into the skin 2 times daily 5 pen 0    LORazepam (ATIVAN) 0.5 MG tablet Take 0.5 mg by mouth 2 times daily as needed for Anxiety.  insulin aspart (NOVOLOG) 100 UNIT/ML injection vial Inject 0-10 Units into the skin 3 times daily (before meals) *Per Sliding Scale*      glucose (GLUTOSE) 40 % GEL Take 15 g by mouth as needed 45 g 1       I have reviewed all pertinent PMHx, PSHx, FamHx, SocialHx, medications, and allergies and updated history as appropriate. OBJECTIVE:    VS: BP (!) 157/96   Pulse 110   Temp 97.9 °F (36.6 °C) (Temporal)   Resp 16   Ht 5' 4\" (1.626 m)   Wt 129 lb (58.5 kg)   SpO2 100%   BMI 22.14 kg/m²   General appearance: Alert, Awake, Oriented times 3, no distress  Lungs: Lungs clear to auscultation bilaterally. No rhonchi, crackles or wheezes  Heart: S1 S2  Regular rate and rhythm.  No rub, murmur or gallop  Abdomen: Abdomen soft but obese, non-tender. non-distended BS normal. No masses, organomegaly, no guarding rebound or rigidity. Extremities: No edema, Peripheral pulses palpable 2/4    ASSESSMENT/PLAN:  1. Gastroparesis  - metoclopramide (REGLAN) 5 MG tablet; Take 1 tablet by mouth 3 times daily (with meals)  Dispense: 120 tablet    2. Essential hypertension, hx of resistant HTN  157/96 repeat 130/80s  Recent ER visit for hypotension 70s/40s, clonidine held   - lisinopril (PRINIVIL;ZESTRIL) 20 MG tablet; Take 1 tablet by mouth daily  Dispense: 30 tablet  - bumetanide (BUMEX) 1 MG tablet; Take 1 tablet by mouth daily  Dispense: 30 tablet      3. Hypothyroidism, unspecified type  - levothyroxine (SYNTHROID) 50 MCG tablet; Take 1 tablet by mouth Daily  Dispense: 30 tablet    4. Hyperlipidemia, unspecified hyperlipidemia type  - atorvastatin (LIPITOR) 40 MG tablet; Take 1 tablet by mouth nightly  Dispense: 30 tablet    5. IDDM1, poorly controlled   On basaglar 10 U BID and novolog SS  Est care with endocrinologist Dr Sobia merritt  BG random, highest 600s, lowest 40s    6.  ESRD on PD, follow with Dr Debbie Taylor   Getting EPO and iron transfusion per Dr Debbie Taylor       HCM  - PAP smear every 3 years 6 months ago   May do every 5 years if >30 and high risk HPV negative   - Chlamydia and gonorrhea  screening for sexually active female 25years old at high risk No  - ETOH misuse No    Health Maintenance Due   Topic Date Due    Varicella vaccine (1 of 2 - 2-dose childhood series) 05/20/1993    Pneumococcal 0-64 years Vaccine (1 of 3 - PCV13) 05/20/1998    Diabetic foot exam  05/20/2002    Diabetic retinal exam  05/20/2002    Hepatitis B vaccine (1 of 3 - Risk 3-dose series) 05/20/2011    Cervical cancer screen  05/20/2013    Flu vaccine (1) 09/01/2020       I have reviewed my findings and recommendations with Jb Vickers and Dr Valentino Sayers     RTC in 3 months, call clinic if UTI symptoms fail to improve after 5 days of amoxicillin, may need UA and culture     Jeremy Trevino MD PGY-3  9/3/2020 10:29 AM

## 2020-09-03 NOTE — PATIENT INSTRUCTIONS
Please call office to get an appointment if your urinary symptoms not improved after amoxicillin  Check your BP 2 times a day or having symptoms dizziness

## 2020-09-08 ENCOUNTER — HOSPITAL ENCOUNTER (OUTPATIENT)
Dept: INFUSION THERAPY | Age: 30
Setting detail: INFUSION SERIES
Discharge: HOME | End: 2020-09-08
Payer: COMMERCIAL

## 2020-09-08 DIAGNOSIS — E88.09 HYPOALBUMINEMIA: Primary | ICD-10-CM

## 2020-09-08 RX ORDER — ALBUMIN (HUMAN) 12.5 G/50ML
25 SOLUTION INTRAVENOUS ONCE
Status: DISCONTINUED | OUTPATIENT
Start: 2020-09-08 | End: 2020-09-09

## 2020-09-08 RX ORDER — SODIUM CHLORIDE 0.9 % (FLUSH) 0.9 %
10 SYRINGE (ML) INJECTION PRN
Status: CANCELLED | OUTPATIENT
Start: 2020-09-08

## 2020-09-08 RX ORDER — ALBUMIN (HUMAN) 12.5 G/50ML
25 SOLUTION INTRAVENOUS ONCE
Status: CANCELLED
Start: 2020-09-08

## 2020-09-08 RX ORDER — DILTIAZEM HYDROCHLORIDE 240 MG/1
240 CAPSULE, EXTENDED RELEASE ORAL DAILY
Qty: 30 CAPSULE | Refills: 2 | Status: SHIPPED
Start: 2020-09-08 | End: 2020-10-05 | Stop reason: ALTCHOICE

## 2020-09-08 RX ORDER — INSULIN GLARGINE 100 [IU]/ML
10 INJECTION, SOLUTION SUBCUTANEOUS 2 TIMES DAILY
Qty: 5 PEN | Refills: 5 | Status: ON HOLD
Start: 2020-09-08 | End: 2020-10-24 | Stop reason: HOSPADM

## 2020-09-08 RX ORDER — SODIUM CHLORIDE 0.9 % (FLUSH) 0.9 %
10 SYRINGE (ML) INJECTION PRN
Status: DISCONTINUED | OUTPATIENT
Start: 2020-09-08 | End: 2020-09-09

## 2020-09-17 RX ORDER — SODIUM CHLORIDE 0.9 % (FLUSH) 0.9 %
10 SYRINGE (ML) INJECTION PRN
Status: CANCELLED
Start: 2020-09-17

## 2020-09-17 RX ORDER — ALBUMIN (HUMAN) 12.5 G/50ML
25 SOLUTION INTRAVENOUS ONCE
Status: CANCELLED
Start: 2020-09-17

## 2020-09-18 ENCOUNTER — HOSPITAL ENCOUNTER (OUTPATIENT)
Dept: INFUSION THERAPY | Age: 30
Setting detail: INFUSION SERIES
Discharge: HOME | End: 2020-09-18
Payer: COMMERCIAL

## 2020-09-29 ENCOUNTER — VIRTUAL VISIT (OUTPATIENT)
Dept: ENDOCRINOLOGY | Age: 30
End: 2020-09-29
Payer: COMMERCIAL

## 2020-09-29 PROBLEM — N18.6 ESRD (END STAGE RENAL DISEASE) (HCC): Status: ACTIVE | Noted: 2020-09-29

## 2020-09-29 PROBLEM — E10.69 TYPE 1 DIABETES MELLITUS WITH OTHER SPECIFIED COMPLICATION (HCC): Status: ACTIVE | Noted: 2018-07-19

## 2020-09-29 PROCEDURE — G8427 DOCREV CUR MEDS BY ELIG CLIN: HCPCS | Performed by: INTERNAL MEDICINE

## 2020-09-29 PROCEDURE — 3052F HG A1C>EQUAL 8.0%<EQUAL 9.0%: CPT | Performed by: INTERNAL MEDICINE

## 2020-09-29 PROCEDURE — G8420 CALC BMI NORM PARAMETERS: HCPCS | Performed by: INTERNAL MEDICINE

## 2020-09-29 PROCEDURE — 2022F DILAT RTA XM EVC RTNOPTHY: CPT | Performed by: INTERNAL MEDICINE

## 2020-09-29 PROCEDURE — 4004F PT TOBACCO SCREEN RCVD TLK: CPT | Performed by: INTERNAL MEDICINE

## 2020-09-29 PROCEDURE — 99214 OFFICE O/P EST MOD 30 MIN: CPT | Performed by: INTERNAL MEDICINE

## 2020-09-29 RX ORDER — INSULIN HUMAN 100 [IU]/ML
INJECTION, SUSPENSION SUBCUTANEOUS
Qty: 2 PEN | Refills: 3 | Status: ON HOLD
Start: 2020-09-29 | End: 2020-10-09 | Stop reason: CLARIF

## 2020-09-29 RX ORDER — INSULIN ASPART 100 [IU]/ML
3 INJECTION, SOLUTION INTRAVENOUS; SUBCUTANEOUS
Status: ON HOLD | COMMUNITY
End: 2020-10-24 | Stop reason: HOSPADM

## 2020-09-29 NOTE — PROGRESS NOTES
[address] [place] [address] Department of Endocrinology Diabetes and Metabolism   [address] [facility] [address]   Phone: [phone]  Fax: [phone]    Date of Service: 9/29/2020    Primary Care Physician: Opal Hollins MD  Referring physician: Kyle Reynolds MD  Provider: Oliva Niño MD     Reason for the visit:  Poorly controlled DM type 1     History of Present Illness: The history is provided by the patient. No  was used. Accuracy of the patient data is excellent.   Truman Denver is a very pleasant 31 y.o. female seen today for diabetes management     Truman Denver was diagnosed with diabetes at age 17 and currently on Lantus 10 units BID, Novolog 3 units with meals + ss 1:50:150   The patient has been checking blood sugar four times a day and readings are all over the place    Her DM complicated with ESRD and currently on Peritoneal dialysis   Most recent A1c results summarized below  Lab Results   Component Value Date    LABA1C 8.6 07/19/2020    LABA1C 8.8 01/23/2020    LABA1C 9.8 01/13/2020     Patient reported frequent hypoglycemic episodes both fasting and overnight   The patient hasn't been mindful of what has been eating and wasn't following diabetes diet as encouraged   I reviewed current medications and the patient has no issues with diabetes medications  Truman Denver is up to date with eye exam. + diabetic retinopathy   The patient performs her own feet care  Microvascular complications:  + Retinopathy, + ESRD on peritoneal dialysis, + Neuropathy   The patient receives Flushot every year and up to date with the Pneumonia vaccine     PAST MEDICAL HISTORY   Past Medical History:   Diagnosis Date    Acute congestive heart failure ([address]     BC (acute kidney injury) ([address] 10/1/2019    Cephalgia 10/9/2019    Chronic kidney disease     Depression     Diabetes mellitus ([address]     Diabetic ketoacidosis ([address] 8/27/2011    Hemodialysis patient [facility])     History of blood transfusion 2019    Iron deficiency anemia 10/1/2019    MDRO (multiple drug resistant organisms) resistance     MRSA (methicillin resistant Staphylococcus aureus)     back wound abcess    Non compliance w medication regimen 3/30/2016    Other disorders of kidney and ureter     Pregnancy 3/30/2016    16 weeks    Severe pre-eclampsia in third trimester 2016       PAST SURGICAL HISTORY   Past Surgical History:   Procedure Laterality Date    BACK SURGERY      abscess     SECTION      x2    CHOLECYSTECTOMY, LAPAROSCOPIC N/A 2019    CHOLECYSTECTOMY LAPAROSCOPIC performed by Maciej Ramirez MD at [facility] N/A 2018    COLONOSCOPY WITH BIOPSY performed by Todd Euceda MD at [address] [facility] ([place], [place] N/A 2018    COLONOSCOPY WITH BIOPSY performed by Ned Jordan MD at [address]  2012    EF 57%    ECHO COMPL W DOP COLOR FLOW  6/10/2013         LAPAROSCOPY INSERTION PERITONEAL CATHETER N/A 2020    LAPAROSCOPIC INSERTION PERITONEAL DIALYSIS CATHETER performed by Mary Katz MD at [facility] [address] ESOPHAGOGASTRODUODENOSCOPY TRANSORAL DIAGNOSTIC N/A 2018    EGD ESOPHAGOGASTRODUODENOSCOPY performed by Todd Euceda MD [address] [address]  2018    UPPER GASTROINTESTINAL ENDOSCOPY  2018    EGD BIOPSY performed by Ned Jordan MD at [facility] 10/11/2019    EGD ESOPHAGOGASTRODUODENOSCOPY performed by Tonio Rojo DO at [facility]:   reports that she has been smoking cigarettes. She has a 3.00 pack-year smoking history. She uses smokeless tobacco.  Alcohol:   reports no history of alcohol use. Drugs:   reports no history of drug use.     FAMILY HISTORY   Family History   Problem Relation Age of Onset    Asthma Mother     Hypertension Mother    [facility] Blood Pressure Mother     Diabetes Mother     Asthma Brother     High Blood Pressure Father        ALLERGIES AND DRUG REACTIONS   Allergies   Allergen Reactions    Toradol [Ketorolac Tromethamine] Anaphylaxis and Hives       CURRENT MEDICATIONS   Current Outpatient Medications   Medication Sig Dispense Refill    insulin aspart (NOVOLOG FLEXPEN) 100 UNIT/ML injection pen Inject into the skin 3 times daily (before meals) 3 UNITS AC PLUS SS      insulin NPH (HUMULIN N KWIKPEN) 100 UNIT/ML injection pen 6 units daily at night 2 pen 3    insulin glargine (LANTUS SOLOSTAR) 100 UNIT/ML injection pen Inject 10 Units into the skin 2 times daily 5 pen 5    metOLazone (ZAROXOLYN) 2.5 MG tablet Take 2.5 mg by mouth daily      Ferric Citrate (AURYXIA) 1  MG(Fe) TABS Take by mouth 3 times daily Take 3 times a day w/ meals      metoclopramide (REGLAN) 5 MG tablet Take 1 tablet by mouth 3 times daily (with meals) 120 tablet 3    lisinopril (PRINIVIL;ZESTRIL) 20 MG tablet Take 1 tablet by mouth daily 30 tablet 5    levothyroxine (SYNTHROID) 50 MCG tablet Take 1 tablet by mouth Daily 30 tablet 5    atorvastatin (LIPITOR) 40 MG tablet Take 1 tablet by mouth nightly 30 tablet 5    metoprolol (LOPRESSOR) 100 MG tablet Take 1 tablet by mouth 2 times daily 60 tablet 2    vitamin D (ERGOCALCIFEROL) 1.25 MG (73607 UT) CAPS capsule Take 1 capsule by mouth once a week 5 capsule 2    bumetanide (BUMEX) 1 MG tablet Take 1 tablet by mouth daily 30 tablet 2    potassium chloride (KLOR-CON) 20 MEQ packet Take 20 mEq by mouth daily 30 each 0    blood glucose test strips (ASCENSIA AUTODISC VI;ONE TOUCH ULTRA TEST VI) strip Indications: 5x a day Freestyle Lite -Tests 5 times daily and as needed for low glucose.   E10.10 200 strip 3    Lancets Thin MISC Indications: cks 5xa a day cks 5xa a day 100 each 5    epoetin nena-epbx (RETACRIT) 4000 UNIT/ML SOLN injection Inject 2 mLs into the skin three times a week Per Nephrology 16.5 mL 0    LORazepam (ATIVAN) 0.5 MG tablet Take 0.5 mg by mouth 2 times daily as needed for Anxiety.  glucose (GLUTOSE) 40 % GEL Take 15 g by mouth as needed 45 g 1    Insulin Syringe-Needle U-100 (INSULIN SYRINGE .5CC/30GX1/2\") 30G X 1/2\" 0.5 ML MISC by Does not apply route. Indications: uses 6-7 a day      dilTIAZem (TIAZAC) 240 MG extended release capsule Take 1 capsule by mouth daily (Patient not taking: Reported on 9/29/2020) 30 capsule 2    dilTIAZem (CARDIZEM CD) 240 MG extended release capsule Take 1 capsule by mouth daily (Patient not taking: Reported on 9/29/2020) 30 capsule 2    insulin aspart (NOVOLOG) 100 UNIT/ML injection vial Inject 0-10 Units into the skin 3 times daily (before meals) *Per Sliding Scale* (Patient not taking: Reported on 9/29/2020) 3 vial 3     No current facility-administered medications for this visit. Review of Systems  Constitutional: No fever, no chills, no diaphoresis, no generalized weakness. HEENT: No blurred vision, No sore throat, no ear pain, no hair loss  Neck: denied any neck swelling, difficulty swallowing,   Cardio-pulmonary: No CP, SOB or palpitation, No orthopnea or PND. No cough or wheezing. GI: No N/V/D, no constipation, No abdominal pain, no melena or hematochezia   : Denied any dysuria, hematuria, flank pain, discharge, or incontinence. Skin: denied any rash, ulcer, Hirsute, or hyperpigmentation. MSK: denied any joint deformity, joint pain/swelling, muscle pain, or back pain. Neuro: no numbness, no tingling, no weakness, _    OBJECTIVE    There were no vitals taken for this visit.   BP Readings from Last 4 Encounters:   09/03/20 (!) 138/93   08/31/20 111/73   08/24/20 106/80   08/17/20 (!) 89/54     Wt Readings from Last 6 Encounters:   09/03/20 129 lb (58.5 kg)   07/29/20 141 lb 8 oz (64.2 kg)   07/20/20 141 lb 12.1 oz (64.3 kg)   07/14/20 110 lb (49.9 kg)   06/26/20 143 lb (64.9 kg)   06/02/20 127 lb 10.3 oz (57.9 kg)     Physical examination:  Due to this being a TeleHealth encounter, evaluation of the following organ systems is limited: Vitals/Constitutional/EENT/Resp/CV/GI//MS/Neuro/Skin/Heme-Lymph-Imm. Modified physical exam through Telemedicine camera    General: Communicating well via camera   Neck: no obvious neck mass. No obvious neck deformity     CVS: no distress   Chest: no distress. Chest is moving with respiration    Extremities:  no visible tremor  Skin: No visible rashes as seen from camera   Musculoskeletal: no visible deformity  Neuro: Alert and oriented to person, place, and time. Psychiatric: Normal mood and affect.  Behavior is normal      Review of Laboratory Data:  I personally reviewed the following lab:  Lab Results   Component Value Date/Time    WBC 1.7 (L) 08/10/2020 07:56 PM    RBC 4.23 08/10/2020 07:56 PM    HGB 12.9 08/10/2020 07:56 PM    HCT 39.7 08/10/2020 07:56 PM    MCV 93.9 08/10/2020 07:56 PM    MCH 30.5 08/10/2020 07:56 PM    MCHC 32.5 08/10/2020 07:56 PM    RDW 15.8 (H) 08/10/2020 07:56 PM     (L) 08/10/2020 07:56 PM    MPV 10.1 08/10/2020 07:56 PM    BANDS 3 06/25/2012 03:15 PM      Lab Results   Component Value Date/Time     08/26/2020 02:00 PM    K 3.3 (L) 08/26/2020 02:00 PM    K 3.3 (L) 08/10/2020 07:56 PM    CO2 22 08/26/2020 02:00 PM    BUN 50 (H) 08/26/2020 02:00 PM    CREATININE 7.1 (H) 08/26/2020 02:00 PM    CALCIUM 8.3 (L) 08/26/2020 02:00 PM    LABGLOM 8 08/26/2020 02:00 PM    GFRAA 8 08/26/2020 02:00 PM      Lab Results   Component Value Date/Time    TSH 12.140 (H) 07/18/2020 05:10 AM    T4FREE 0.84 (L) 07/20/2020 02:00 PM    L7NNHKI 8.6 11/05/2015 02:20 AM    W7VJARJ 36.73 (L) 07/20/2020 02:00 PM     Lab Results   Component Value Date    LABA1C 8.6 07/19/2020    GLUCOSE 233 08/26/2020    GLUCOSE 130 05/18/2012    MALBCR 2949.3 01/15/2020    LABMICR 1740.1 01/15/2020    LABCREA 59 01/15/2020    LABCREA 61 01/15/2020     Lab Results   Component Value Date    LABA1C 8.6 07/19/2020    LABA1C 8.8 01/23/2020    LABA1C 9.8 01/13/2020     Lab Results   Component Value Date    TRIG 82 01/14/2020    HDL 33 01/14/2020    LDLCALC 46 01/14/2020    CHOL 95 01/14/2020     Lab Results   Component Value Date    VITD25 12 07/19/2020    VITD25 <5 01/15/2020       Medical Records/Labs/Images review:   I personally reviewed and summarized previous records   All labs and imaging studies were independently reviewed     Salma Tirado, a 31 y.o.-old female seen in for the following issues     Diabetes Mellitus Type 1    · Patient's diabetes is uncontrol complicated with both macro and microvascular complications   · Will change DM regimen to Lantus 10 units daily in am, Novolog 3 units with meals + sliding scale 1:50>150, NPH 5 units at night to cover peritoneal dialysis   · The patient was advised to continue checking blood sugars 4 times a day before meals and at bedtime and send BS readings to our office in a week. · Will order insulin pump and CGM to help with better DM control   · Patient will need routine diabetes maintenance and prevention  · The patient was referred to diabetic educator for further teaching on carb counting     Dietary noncompliance   Discussed with patient the importance of eating consistent carbohydrate meals, avoiding high glycemic index food. Also, discussed with patient the risk and negative consequences of dietary noncompliance on blood glucose control, blood pressure and weight   Referral to DM class     Hypoglycemia    Adjusted DM regimen as per above    Discussed hypoglycemia management measures with patient    Ordered insulin pump and CGM     ESRD   On Peritoneal dialysis    Patient is at risk for hypoglycemia while receiving insulin due to ESRD    Return in about 6 weeks (around 11/10/2020) for DM type 1 . The above issues were reviewed with the patient who understood and agreed with the plan.     Thank you for allowing us to participate in the care of this patient. Please do not hesitate to contact us with any additional questions. Diagnosis Orders   1. Type 1 diabetes mellitus with other specified complication ([facility]     2. ESRD (end stage renal disease) ([facility]     3. Hypoglycemia       Monica Verduzco MD  Endocrinologist, [facility] and Endocrinology   [address] [place] [address]   Phone: [phone]  Fax: [phone]  --------------------------------------------  An electronic signature was used to authenticate this note. Robyn Pickett MD on 9/29/2020 at 10:33 AM    This visit was performed as a virtual video visit using a synchronous, two-way, audio-video telehealth technology platform  This Virtual  Visit was conducted, with patient's consent, to reduce the patient's risk of exposure to COVID-19 and provide continuity of care.

## 2020-09-29 NOTE — LETTER
LABA1C 9.8 2020     Patient reported frequent hypoglycemic episodes both fasting and overnight   The patient hasn't been mindful of what has been eating and wasn't following diabetes diet as encouraged   I reviewed current medications and the patient has no issues with diabetes medications  [address] is up to date with eye exam. + diabetic retinopathy   The patient performs her own feet care  Microvascular complications:  + Retinopathy, + ESRD on peritoneal dialysis, + Neuropathy   The patient receives Flushot every year and up to date with the Pneumonia vaccine     PAST MEDICAL HISTORY   Past Medical History:   Diagnosis Date    [facility] failure [facility]     BC (acute kidney injury) ([facility] 10/1/2019    Cephalgia 10/9/2019    Chronic kidney disease     Depression     Diabetes mellitus ([facility]     Diabetic ketoacidosis ([facility] 2011    Hemodialysis patient ([facility]     History of blood transfusion 2019    Iron deficiency anemia 10/1/2019    MDRO (multiple drug resistant organisms) resistance     MRSA (methicillin resistant Staphylococcus aureus)     back wound abcess    Non compliance w medication regimen 3/30/2016    Other disorders of kidney and ureter     Pregnancy 3/30/2016    16 weeks    Severe pre-eclampsia in third trimester 2016       PAST SURGICAL HISTORY   Past Surgical History:   Procedure Laterality Date    BACK SURGERY      abscess     SECTION      x2    CHOLECYSTECTOMY, LAPAROSCOPIC N/A 2019    CHOLECYSTECTOMY LAPAROSCOPIC performed by Cristy Adame MD at [facility] N/A 2018    COLONOSCOPY WITH BIOPSY performed by Alfonzo Greer MD at [address] [place] [address] COLONOSCOPY N/A 2018    COLONOSCOPY WITH BIOPSY performed by Romayne Downer, MD at [address] [place] [address] ECHO COMPL [address]  2012    EF 57%    ECHO COMPL W DOP COLOR FLOW  6/10/2013         LAPAROSCOPY INSERTION PERITONEAL CATHETER N/A 2020 LAPAROSCOPIC INSERTION PERITONEAL DIALYSIS CATHETER performed by Meghana Chawla MD at [facility] [address] ESOPHAGOGASTRODUODENOSCOPY TRANSORAL DIAGNOSTIC N/A 5/30/2018    EGD ESOPHAGOGASTRODUODENOSCOPY performed by Angel Smith MD at [address] [facility] TUNNELED VENOUS PORT PLACEMENT  04/2018    UPPER GASTROINTESTINAL ENDOSCOPY  12/18/2018    EGD BIOPSY performed by Max Elias MD at [address] 10/11/2019    EGD ESOPHAGOGASTRODUODENOSCOPY performed by King Peraza DO at [facility]:   reports that she has been smoking cigarettes. She has a 3.00 pack-year smoking history. She uses smokeless tobacco.  Alcohol:   reports no history of alcohol use. Drugs:   reports no history of drug use.     FAMILY HISTORY   Family History   Problem Relation Age of Onset    Asthma Mother     Hypertension Mother     High Blood Pressure Mother     Diabetes Mother     Asthma Brother     High Blood Pressure Father        ALLERGIES AND DRUG REACTIONS   Allergies   Allergen Reactions    Toradol [Ketorolac Tromethamine] Anaphylaxis and Hives       CURRENT MEDICATIONS   Current Outpatient Medications   Medication Sig Dispense Refill    insulin aspart (NOVOLOG FLEXPEN) 100 UNIT/ML injection pen Inject into the skin 3 times daily (before meals) 3 UNITS AC PLUS SS      insulin NPH (HUMULIN N KWIKPEN) 100 UNIT/ML injection pen 6 units daily at night 2 pen 3    insulin glargine (LANTUS SOLOSTAR) 100 UNIT/ML injection pen Inject 10 Units into the skin 2 times daily 5 pen 5    metOLazone (ZAROXOLYN) 2.5 MG tablet Take 2.5 mg by mouth daily      Ferric Citrate (AURYXIA) 1  MG(Fe) TABS Take by mouth 3 times daily Take 3 times a day w/ meals      metoclopramide (REGLAN) 5 MG tablet Take 1 tablet by mouth 3 times daily (with meals) 120 tablet 3    lisinopril (PRINIVIL;ZESTRIL) 20 MG tablet Take 1 tablet by mouth daily 30 tablet 5  levothyroxine (SYNTHROID) 50 MCG tablet Take 1 tablet by mouth Daily 30 tablet 5    atorvastatin (LIPITOR) 40 MG tablet Take 1 tablet by mouth nightly 30 tablet 5    metoprolol (LOPRESSOR) 100 MG tablet Take 1 tablet by mouth 2 times daily 60 tablet 2    vitamin D (ERGOCALCIFEROL) 1.25 MG (94450 UT) CAPS capsule Take 1 capsule by mouth once a week 5 capsule 2    bumetanide (BUMEX) 1 MG tablet Take 1 tablet by mouth daily 30 tablet 2    potassium chloride (KLOR-CON) 20 MEQ packet Take 20 mEq by mouth daily 30 each 0    blood glucose test strips (ASCENSIA AUTODISC VI;ONE TOUCH ULTRA TEST VI) strip Indications: 5x a day Freestyle Lite -Tests 5 times daily and as needed for low glucose. E10.10 200 strip 3    Lancets Thin MISC Indications: cks 5xa a day cks 5xa a day 100 each 5    epoetin nena-epbx (RETACRIT) 4000 UNIT/ML SOLN injection Inject 2 mLs into the skin three times a week Per Nephrology 16.5 mL 0    LORazepam (ATIVAN) 0.5 MG tablet Take 0.5 mg by mouth 2 times daily as needed for Anxiety.  glucose (GLUTOSE) 40 % GEL Take 15 g by mouth as needed 45 g 1    Insulin Syringe-Needle U-100 (INSULIN SYRINGE .5CC/30GX1/2\") 30G X 1/2\" 0.5 ML MISC by Does not apply route. Indications: uses 6-7 a day      dilTIAZem (TIAZAC) 240 MG extended release capsule Take 1 capsule by mouth daily (Patient not taking: Reported on 9/29/2020) 30 capsule 2    dilTIAZem (CARDIZEM CD) 240 MG extended release capsule Take 1 capsule by mouth daily (Patient not taking: Reported on 9/29/2020) 30 capsule 2    insulin aspart (NOVOLOG) 100 UNIT/ML injection vial Inject 0-10 Units into the skin 3 times daily (before meals) *Per Sliding Scale* (Patient not taking: Reported on 9/29/2020) 3 vial 3     No current facility-administered medications for this visit. Review of Systems  Constitutional: No fever, no chills, no diaphoresis, no generalized weakness. MCHC 32.5 08/10/2020 07:56 PM    RDW 15.8 (H) 08/10/2020 07:56 PM     (L) 08/10/2020 07:56 PM    MPV 10.1 08/10/2020 07:56 PM    BANDS 3 06/25/2012 03:15 PM      Lab Results   Component Value Date/Time     08/26/2020 02:00 PM    K 3.3 (L) 08/26/2020 02:00 PM    K 3.3 (L) 08/10/2020 07:56 PM    CO2 22 08/26/2020 02:00 PM    BUN 50 (H) 08/26/2020 02:00 PM    CREATININE 7.1 (H) 08/26/2020 02:00 PM    CALCIUM 8.3 (L) 08/26/2020 02:00 PM    LABGLOM 8 08/26/2020 02:00 PM    GFRAA 8 08/26/2020 02:00 PM      Lab Results   Component Value Date/Time    TSH 12.140 (H) 07/18/2020 05:10 AM    T4FREE 0.84 (L) 07/20/2020 02:00 PM    A5UGVDX 8.6 11/05/2015 02:20 AM    F7UKUOY 36.73 (L) 07/20/2020 02:00 PM     Lab Results   Component Value Date    LABA1C 8.6 07/19/2020    GLUCOSE 233 08/26/2020    GLUCOSE 130 05/18/2012    MALBCR 2949.3 01/15/2020    LABMICR 1740.1 01/15/2020    LABCREA 59 01/15/2020    LABCREA 61 01/15/2020     Lab Results   Component Value Date    LABA1C 8.6 07/19/2020    LABA1C 8.8 01/23/2020    LABA1C 9.8 01/13/2020     Lab Results   Component Value Date    TRIG 82 01/14/2020    HDL 33 01/14/2020    LDLCALC 46 01/14/2020    CHOL 95 01/14/2020     Lab Results   Component Value Date    VITD25 12 07/19/2020    VITD25 <5 01/15/2020       Medical Records/Labs/Images review:   I personally reviewed and summarized previous records   All labs and imaging studies were independently reviewed     Salma Tirado, a 31 y.o.-old female seen in for the following issues     Diabetes Mellitus Type 1    · Patient's diabetes is uncontrol complicated with both macro and microvascular complications   · Will change DM regimen to Lantus 10 units daily in am, Novolog 3 units with meals + sliding scale 1:50>150, NPH 5 units at night to cover peritoneal dialysis   · The patient was advised to continue checking blood sugars 4 times a day

## 2020-10-01 ENCOUNTER — HOSPITAL ENCOUNTER (OUTPATIENT)
Dept: INFUSION THERAPY | Age: 30
Setting detail: INFUSION SERIES
Discharge: HOME | End: 2020-10-01
Payer: COMMERCIAL

## 2020-10-01 DIAGNOSIS — E88.09 HYPOALBUMINEMIA: Primary | ICD-10-CM

## 2020-10-01 RX ORDER — SODIUM CHLORIDE 0.9 % (FLUSH) 0.9 %
10 SYRINGE (ML) INJECTION PRN
Status: CANCELLED | OUTPATIENT
Start: 2020-10-01

## 2020-10-01 RX ORDER — ALBUMIN (HUMAN) 12.5 G/50ML
25 SOLUTION INTRAVENOUS ONCE
Status: DISCONTINUED | OUTPATIENT
Start: 2020-10-01 | End: 2020-10-01

## 2020-10-01 RX ORDER — ALBUMIN (HUMAN) 12.5 G/50ML
25 SOLUTION INTRAVENOUS ONCE
Status: CANCELLED
Start: 2020-10-01

## 2020-10-01 RX ORDER — SODIUM CHLORIDE 0.9 % (FLUSH) 0.9 %
10 SYRINGE (ML) INJECTION PRN
Status: DISCONTINUED | OUTPATIENT
Start: 2020-10-01 | End: 2020-10-01

## 2020-10-02 ENCOUNTER — HOSPITAL ENCOUNTER (OUTPATIENT)
Dept: INFUSION THERAPY | Age: 30
Setting detail: INFUSION SERIES
Discharge: HOME | End: 2020-10-02
Payer: COMMERCIAL

## 2020-10-02 VITALS
RESPIRATION RATE: 18 BRPM | SYSTOLIC BLOOD PRESSURE: 135 MMHG | TEMPERATURE: 97.5 F | HEART RATE: 81 BPM | OXYGEN SATURATION: 96 % | DIASTOLIC BLOOD PRESSURE: 82 MMHG

## 2020-10-02 DIAGNOSIS — E88.09 HYPOALBUMINEMIA: Primary | ICD-10-CM

## 2020-10-02 PROCEDURE — 96523 IRRIG DRUG DELIVERY DEVICE: CPT

## 2020-10-02 PROCEDURE — 99211 OFF/OP EST MAY X REQ PHY/QHP: CPT

## 2020-10-02 RX ORDER — ALBUMIN (HUMAN) 12.5 G/50ML
25 SOLUTION INTRAVENOUS ONCE
Status: DISCONTINUED | OUTPATIENT
Start: 2020-10-02 | End: 2020-10-02

## 2020-10-02 RX ORDER — HEPARIN SODIUM (PORCINE) LOCK FLUSH IV SOLN 100 UNIT/ML 100 UNIT/ML
500 SOLUTION INTRAVENOUS PRN
Status: CANCELLED | OUTPATIENT
Start: 2020-10-02

## 2020-10-02 RX ORDER — HEPARIN SODIUM (PORCINE) LOCK FLUSH IV SOLN 100 UNIT/ML 100 UNIT/ML
SOLUTION INTRAVENOUS
Status: DISCONTINUED
Start: 2020-10-02 | End: 2020-10-03 | Stop reason: HOSPADM

## 2020-10-02 RX ORDER — ALBUMIN (HUMAN) 12.5 G/50ML
25 SOLUTION INTRAVENOUS ONCE
Status: CANCELLED
Start: 2020-10-02

## 2020-10-02 RX ORDER — HEPARIN SODIUM (PORCINE) LOCK FLUSH IV SOLN 100 UNIT/ML 100 UNIT/ML
SOLUTION INTRAVENOUS
Status: DISPENSED
Start: 2020-10-02 | End: 2020-10-02

## 2020-10-02 RX ORDER — SODIUM CHLORIDE 0.9 % (FLUSH) 0.9 %
10 SYRINGE (ML) INJECTION PRN
Status: CANCELLED | OUTPATIENT
Start: 2020-10-02

## 2020-10-02 RX ORDER — SODIUM CHLORIDE 0.9 % (FLUSH) 0.9 %
10 SYRINGE (ML) INJECTION PRN
Status: DISCONTINUED | OUTPATIENT
Start: 2020-10-02 | End: 2020-10-03 | Stop reason: HOSPADM

## 2020-10-02 ASSESSMENT — PAIN SCALES - GENERAL: PAINLEVEL_OUTOF10: 6

## 2020-10-02 ASSESSMENT — PAIN DESCRIPTION - FREQUENCY: FREQUENCY: CONTINUOUS

## 2020-10-02 ASSESSMENT — PAIN - FUNCTIONAL ASSESSMENT: PAIN_FUNCTIONAL_ASSESSMENT: ACTIVITIES ARE NOT PREVENTED

## 2020-10-02 ASSESSMENT — PAIN DESCRIPTION - PAIN TYPE: TYPE: ACUTE PAIN

## 2020-10-02 ASSESSMENT — PAIN DESCRIPTION - LOCATION: LOCATION: ABDOMEN

## 2020-10-02 ASSESSMENT — PAIN DESCRIPTION - PROGRESSION: CLINICAL_PROGRESSION: NOT CHANGED

## 2020-10-02 ASSESSMENT — PAIN DESCRIPTION - ORIENTATION: ORIENTATION: RIGHT;LEFT

## 2020-10-02 ASSESSMENT — PAIN DESCRIPTION - DESCRIPTORS: DESCRIPTORS: ACHING;CONSTANT;DISCOMFORT

## 2020-10-02 ASSESSMENT — PAIN DESCRIPTION - ONSET: ONSET: GRADUAL

## 2020-10-02 NOTE — PROGRESS NOTES
Unable to obtain blood return from port after numerous flushes and heparin. Obtained order from Dr. Lizbeth Fletcher for cath flow. Patient will not stay for cath flow today, stating she has a doctor appointment at [address] [place] [address] flushed and de-accessed. Patient discharged after attempt to start peripheral line.

## 2020-10-05 ENCOUNTER — HOSPITAL ENCOUNTER (EMERGENCY)
Age: 30
Discharge: HOME | DRG: 721 | End: 2020-10-05
Attending: EMERGENCY MEDICINE
Payer: COMMERCIAL

## 2020-10-05 VITALS
SYSTOLIC BLOOD PRESSURE: 119 MMHG | DIASTOLIC BLOOD PRESSURE: 84 MMHG | HEART RATE: 86 BPM | TEMPERATURE: 97.8 F | OXYGEN SATURATION: 100 % | RESPIRATION RATE: 18 BRPM

## 2020-10-05 LAB
ANION GAP SERPL CALCULATED.3IONS-SCNC: 13 MMOL/L (ref 7–16)
BASOPHILS ABSOLUTE: 0.1 E9/L (ref 0–0.2)
BASOPHILS RELATIVE PERCENT: 0.8 % (ref 0–2)
BETA-HYDROXYBUTYRATE: 0.2 MMOL/L (ref 0.02–0.27)
BUN BLDV-MCNC: 32 MG/DL (ref 6–20)
CALCIUM SERPL-MCNC: 8.3 MG/DL (ref 8.6–10.2)
CHLORIDE BLD-SCNC: 101 MMOL/L (ref 98–107)
CO2: 23 MMOL/L (ref 22–29)
CREAT SERPL-MCNC: 6.8 MG/DL (ref 0.5–1)
EOSINOPHILS ABSOLUTE: 0.47 E9/L (ref 0.05–0.5)
EOSINOPHILS RELATIVE PERCENT: 3.8 % (ref 0–6)
GFR AFRICAN AMERICAN: 9
GFR NON-AFRICAN AMERICAN: 9 ML/MIN/1.73
GLUCOSE BLD-MCNC: 149 MG/DL
GLUCOSE BLD-MCNC: 76 MG/DL (ref 74–99)
HCT VFR BLD CALC: 33.3 % (ref 34–48)
HEMOGLOBIN: 10.9 G/DL (ref 11.5–15.5)
IMMATURE GRANULOCYTES #: 0.09 E9/L
IMMATURE GRANULOCYTES %: 0.7 % (ref 0–5)
LACTIC ACID: 0.8 MMOL/L (ref 0.5–2.2)
LYMPHOCYTES ABSOLUTE: 1.76 E9/L (ref 1.5–4)
LYMPHOCYTES RELATIVE PERCENT: 14.2 % (ref 20–42)
MCH RBC QN AUTO: 29.8 PG (ref 26–35)
MCHC RBC AUTO-ENTMCNC: 32.7 % (ref 32–34.5)
MCV RBC AUTO: 91 FL (ref 80–99.9)
METER GLUCOSE: 109 MG/DL (ref 74–99)
METER GLUCOSE: 149 MG/DL (ref 74–99)
MONOCYTES ABSOLUTE: 0.71 E9/L (ref 0.1–0.95)
MONOCYTES RELATIVE PERCENT: 5.7 % (ref 2–12)
NEUTROPHILS ABSOLUTE: 9.25 E9/L (ref 1.8–7.3)
NEUTROPHILS RELATIVE PERCENT: 74.8 % (ref 43–80)
PDW BLD-RTO: 15.8 FL (ref 11.5–15)
PH VENOUS: 7.38 (ref 7.35–7.45)
PLATELET # BLD: 227 E9/L (ref 130–450)
PMV BLD AUTO: 9.2 FL (ref 7–12)
POTASSIUM SERPL-SCNC: 2.9 MMOL/L (ref 3.5–5)
RBC # BLD: 3.66 E12/L (ref 3.5–5.5)
SODIUM BLD-SCNC: 137 MMOL/L (ref 132–146)
WBC # BLD: 12.4 E9/L (ref 4.5–11.5)

## 2020-10-05 PROCEDURE — 82010 KETONE BODYS QUAN: CPT

## 2020-10-05 PROCEDURE — 93005 ELECTROCARDIOGRAM TRACING: CPT | Performed by: EMERGENCY MEDICINE

## 2020-10-05 PROCEDURE — 85025 COMPLETE CBC W/AUTO DIFF WBC: CPT

## 2020-10-05 PROCEDURE — 83605 ASSAY OF LACTIC ACID: CPT

## 2020-10-05 PROCEDURE — 82962 GLUCOSE BLOOD TEST: CPT

## 2020-10-05 PROCEDURE — 80048 BASIC METABOLIC PNL TOTAL CA: CPT

## 2020-10-05 PROCEDURE — 82800 BLOOD PH: CPT

## 2020-10-05 PROCEDURE — 99283 EMERGENCY DEPT VISIT LOW MDM: CPT

## 2020-10-05 RX ORDER — METOLAZONE 5 MG/1
5 TABLET ORAL DAILY
Status: ON HOLD | COMMUNITY
End: 2021-02-27 | Stop reason: HOSPADM

## 2020-10-05 RX ORDER — ERGOCALCIFEROL 1.25 MG/1
50000 CAPSULE ORAL WEEKLY
COMMUNITY
End: 2020-11-20

## 2020-10-05 RX ORDER — HEPARIN SODIUM (PORCINE) LOCK FLUSH IV SOLN 100 UNIT/ML 100 UNIT/ML
SOLUTION INTRAVENOUS
Status: DISCONTINUED
Start: 2020-10-05 | End: 2020-10-05 | Stop reason: HOSPADM

## 2020-10-05 RX ORDER — B,C/FOLIC/ZINC/SELENOMETH/D3/E 0.8-12.5MG
1 TABLET ORAL DAILY
COMMUNITY
End: 2020-11-20

## 2020-10-05 RX ORDER — BUMETANIDE 2 MG/1
2 TABLET ORAL 2 TIMES DAILY
Status: ON HOLD | COMMUNITY
End: 2020-10-24 | Stop reason: HOSPADM

## 2020-10-05 RX ORDER — DEXTROSE MONOHYDRATE 100 MG/ML
INJECTION, SOLUTION INTRAVENOUS ONCE
Status: DISCONTINUED | OUTPATIENT
Start: 2020-10-05 | End: 2020-10-05

## 2020-10-05 RX ORDER — CLONIDINE HYDROCHLORIDE 0.1 MG/1
0.1 TABLET ORAL 2 TIMES DAILY PRN
Status: ON HOLD | COMMUNITY
End: 2021-02-27 | Stop reason: HOSPADM

## 2020-10-05 NOTE — ED NOTES
Accessed pts port to the left chest wall with a #19gauge 1 inch peters needle. Flushes easily with normal saline but unable to obtain blood.   Dressing applied to chest wall     Gilberto Lu RN  10/05/20 [id number]

## 2020-10-05 NOTE — ED NOTES
Pt does not open eyes when name is called but when you ask her to do something she will do it. Will not communicate or answer any questions.      Daria Coleman RN  10/05/20 [id number]

## 2020-10-05 NOTE — ED PROVIDER NOTES
acute changes, non-specific EKG and prolonged QT interval  Comparison: stable as compared to patient's most recent EKG       10:22 AM EDT  Patient is now spontaneously alert prior to initiation of D10. Will order breakfast.  She is resting comfortably. No complaints. 11:34 AM EDT  Patient up walking unassisted to the restroom. --------------------------------------------- PAST HISTORY ---------------------------------------------  Past Medical History:  has a past medical history of Acute congestive heart failure ([facility] [address] BC (acute kidney injury) ([facility] [address] Cephalgia, Chronic kidney disease, Depression, Diabetes mellitus ([facility] [address] Diabetic ketoacidosis ([facility] [address] Hemodialysis patient ([facility] [address] History of blood transfusion, Iron deficiency anemia, MDRO (multiple drug resistant organisms) resistance, MRSA (methicillin resistant Staphylococcus aureus), Non compliance w medication regimen, Other disorders of kidney and ureter, Pregnancy, and Severe pre-eclampsia in third trimester. Past Surgical History:  has a past surgical history that includes ECHO Compl W Dop Color Flow (2012); ECHO Compl W Dop Color Flow (6/10/2013);  section; Tunneled venous port placement (2018); pr esophagogastroduodenoscopy transoral diagnostic (N/A, 2018); back surgery; Colonoscopy (N/A, 2018); Colonoscopy (N/A, 2018); Upper gastrointestinal endoscopy (2018); Upper gastrointestinal endoscopy (N/A, 10/11/2019); Cholecystectomy, laparoscopic (N/A, 2019); and LAPAROSCOPY INSERTION PERITONEAL CATHETER (N/A, 2020). Social History:  reports that she has been smoking cigarettes. She has a 3.00 pack-year smoking history. She uses smokeless tobacco. She reports that she does not drink alcohol or use drugs. Family History: family history includes Asthma in her brother and mother; Diabetes in her mother; High Blood Pressure in her father and mother; Hypertension in her mother.      The patients home medications have been reviewed. Allergies:  Toradol [ketorolac tromethamine]    -------------------------------------------------- RESULTS -------------------------------------------------  Labs:  Results for orders placed or performed during the hospital encounter of 10/05/20   CBC Auto Differential   Result Value Ref Range    WBC 12.4 (H) 4.5 - 11.5 E9/L    RBC 3.66 3.50 - 5.50 E12/L    Hemoglobin 10.9 (L) 11.5 - 15.5 g/dL    Hematocrit 33.3 (L) 34.0 - 48.0 %    MCV 91.0 80.0 - 99.9 fL    MCH 29.8 26.0 - 35.0 pg    MCHC 32.7 32.0 - 34.5 %    RDW 15.8 (H) 11.5 - 15.0 fL    Platelets 328 024 - 439 E9/L    MPV 9.2 7.0 - 12.0 fL    Neutrophils % 74.8 43.0 - 80.0 %    Immature Granulocytes % 0.7 0.0 - 5.0 %    Lymphocytes % 14.2 (L) 20.0 - 42.0 %    Monocytes % 5.7 2.0 - 12.0 %    Eosinophils % 3.8 0.0 - 6.0 %    Basophils % 0.8 0.0 - 2.0 %    Neutrophils Absolute 9.25 (H) 1.80 - 7.30 E9/L    Immature Granulocytes # 0.09 E9/L    Lymphocytes Absolute 1.76 1.50 - 4.00 E9/L    Monocytes Absolute 0.71 0.10 - 0.95 E9/L    Eosinophils Absolute 0.47 0.05 - 0.50 E9/L    Basophils Absolute 0.10 0.00 - 0.20 R0/V   Basic Metabolic Panel   Result Value Ref Range    Sodium 137 132 - 146 mmol/L    Potassium 2.9 (L) 3.5 - 5.0 mmol/L    Chloride 101 98 - 107 mmol/L    CO2 23 22 - 29 mmol/L    Anion Gap 13 7 - 16 mmol/L    Glucose 76 74 - 99 mg/dL    BUN 32 (H) 6 - 20 mg/dL    CREATININE 6.8 (H) 0.5 - 1.0 mg/dL    GFR Non-African American 9 >=60 mL/min/1.73    GFR African American 9     Calcium 8.3 (L) 8.6 - 10.2 mg/dL   Lactic Acid, Plasma   Result Value Ref Range    Lactic Acid 0.8 0.5 - 2.2 mmol/L   pH, venous   Result Value Ref Range    pH, Khurram 7.38 7.35 - 7.45   POCT Glucose   Result Value Ref Range    Meter Glucose 109 (H) 74 - 99 mg/dL   POCT Glucose   Result Value Ref Range    Meter Glucose 149 (H) 74 - 99 mg/dL   EKG 12 Lead   Result Value Ref Range    Ventricular Rate 77 BPM    Atrial Rate 77 BPM    P-R Interval 126 ms

## 2020-10-05 NOTE — ED NOTES
Deaccessed the port after flushing with heparin.   Dressing applied     Mary Soriano RN  10/05/20 0023

## 2020-10-05 NOTE — ED NOTES
Pt is now awake and talking and answering all questions.   States for the last several days her blood sugar has been dropping     Cori Weeks RN  10/05/20 [id number]

## 2020-10-05 NOTE — ED NOTES
Pt resting quietly in bed at the present with no voiced complaints.   Ate breakfast and grandma at the bedside     Mukesh Stratton RN  10/05/20 2268

## 2020-10-05 NOTE — ED NOTES
Since pt is awake, Dr. Mya Cummins discontinued the D10 and wants a breakfast tray given to pt     Russ Phelps RN  10/05/20 3540

## 2020-10-05 NOTE — ED NOTES
Breakfast tray given to pt.   Pt is awake and up and eating and talking and texting on her phone     Caty Bolivar RN  10/05/20 [phone]

## 2020-10-06 LAB
EKG ATRIAL RATE: 77 BPM
EKG P AXIS: 47 DEGREES
EKG P-R INTERVAL: 126 MS
EKG Q-T INTERVAL: 442 MS
EKG QRS DURATION: 100 MS
EKG QTC CALCULATION (BAZETT): 500 MS
EKG R AXIS: 45 DEGREES
EKG T AXIS: 58 DEGREES
EKG VENTRICULAR RATE: 77 BPM

## 2020-10-07 ENCOUNTER — HOSPITAL ENCOUNTER (OUTPATIENT)
Dept: INFUSION THERAPY | Age: 30
Setting detail: INFUSION SERIES
Discharge: HOME | End: 2020-10-07
Payer: COMMERCIAL

## 2020-10-07 VITALS
SYSTOLIC BLOOD PRESSURE: 169 MMHG | OXYGEN SATURATION: 100 % | TEMPERATURE: 98.3 F | HEART RATE: 93 BPM | RESPIRATION RATE: 18 BRPM | DIASTOLIC BLOOD PRESSURE: 88 MMHG

## 2020-10-07 DIAGNOSIS — E88.09 HYPOALBUMINEMIA: Primary | ICD-10-CM

## 2020-10-07 LAB
ALBUMIN SERPL-MCNC: 2.6 G/DL (ref 3.5–5.2)
ALP BLD-CCNC: 112 U/L (ref 35–104)
ALT SERPL-CCNC: 15 U/L (ref 0–32)
ANION GAP SERPL CALCULATED.3IONS-SCNC: 14 MMOL/L (ref 7–16)
AST SERPL-CCNC: 21 U/L (ref 0–31)
BILIRUB SERPL-MCNC: <0.2 MG/DL (ref 0–1.2)
BUN BLDV-MCNC: 30 MG/DL (ref 6–20)
CALCIUM SERPL-MCNC: 8.4 MG/DL (ref 8.6–10.2)
CHLORIDE BLD-SCNC: 98 MMOL/L (ref 98–107)
CO2: 24 MMOL/L (ref 22–29)
CREAT SERPL-MCNC: 7.2 MG/DL (ref 0.5–1)
GFR AFRICAN AMERICAN: 8
GFR NON-AFRICAN AMERICAN: 8 ML/MIN/1.73
GLUCOSE BLD-MCNC: 264 MG/DL (ref 74–99)
PHOSPHORUS: 6.1 MG/DL (ref 2.5–4.5)
POTASSIUM SERPL-SCNC: 3.5 MMOL/L (ref 3.5–5)
SODIUM BLD-SCNC: 136 MMOL/L (ref 132–146)
TOTAL PROTEIN: 5.6 G/DL (ref 6.4–8.3)

## 2020-10-07 PROCEDURE — 2580000003 HC RX 258

## 2020-10-07 PROCEDURE — 2580000003 HC RX 258: Performed by: INTERNAL MEDICINE

## 2020-10-07 PROCEDURE — 6360000002 HC RX W HCPCS: Performed by: INTERNAL MEDICINE

## 2020-10-07 PROCEDURE — 36415 COLL VENOUS BLD VENIPUNCTURE: CPT

## 2020-10-07 PROCEDURE — P9047 ALBUMIN (HUMAN), 25%, 50ML: HCPCS | Performed by: INTERNAL MEDICINE

## 2020-10-07 PROCEDURE — 87340 HEPATITIS B SURFACE AG IA: CPT

## 2020-10-07 PROCEDURE — 96365 THER/PROPH/DIAG IV INF INIT: CPT

## 2020-10-07 PROCEDURE — 96523 IRRIG DRUG DELIVERY DEVICE: CPT

## 2020-10-07 PROCEDURE — 36593 DECLOT VASCULAR DEVICE: CPT

## 2020-10-07 PROCEDURE — 80053 COMPREHEN METABOLIC PANEL: CPT

## 2020-10-07 PROCEDURE — 84100 ASSAY OF PHOSPHORUS: CPT

## 2020-10-07 RX ORDER — SODIUM CHLORIDE 0.9 % (FLUSH) 0.9 %
10 SYRINGE (ML) INJECTION PRN
Status: CANCELLED | OUTPATIENT
Start: 2020-10-07

## 2020-10-07 RX ORDER — HEPARIN SODIUM (PORCINE) LOCK FLUSH IV SOLN 100 UNIT/ML 100 UNIT/ML
500 SOLUTION INTRAVENOUS PRN
Status: DISCONTINUED | OUTPATIENT
Start: 2020-10-07 | End: 2020-10-08 | Stop reason: HOSPADM

## 2020-10-07 RX ORDER — HEPARIN SODIUM (PORCINE) LOCK FLUSH IV SOLN 100 UNIT/ML 100 UNIT/ML
500 SOLUTION INTRAVENOUS PRN
Status: CANCELLED | OUTPATIENT
Start: 2020-10-07

## 2020-10-07 RX ORDER — SODIUM CHLORIDE 0.9 % (FLUSH) 0.9 %
10 SYRINGE (ML) INJECTION PRN
Status: DISCONTINUED | OUTPATIENT
Start: 2020-10-07 | End: 2020-10-08 | Stop reason: HOSPADM

## 2020-10-07 RX ORDER — ALBUMIN (HUMAN) 12.5 G/50ML
25 SOLUTION INTRAVENOUS ONCE
Status: COMPLETED | OUTPATIENT
Start: 2020-10-07 | End: 2020-10-07

## 2020-10-07 RX ORDER — ALBUMIN (HUMAN) 12.5 G/50ML
25 SOLUTION INTRAVENOUS ONCE
Status: CANCELLED
Start: 2020-10-07

## 2020-10-07 RX ORDER — SODIUM CHLORIDE 0.9 % (FLUSH) 0.9 %
SYRINGE (ML) INJECTION
Status: COMPLETED
Start: 2020-10-07 | End: 2020-10-07

## 2020-10-07 RX ADMIN — ALBUMIN (HUMAN) 25 G: 0.25 INJECTION, SOLUTION INTRAVENOUS at 15:46

## 2020-10-07 RX ADMIN — HEPARIN 500 UNITS: 100 SYRINGE at 16:40

## 2020-10-07 RX ADMIN — SODIUM CHLORIDE, PRESERVATIVE FREE 10 ML: 5 INJECTION INTRAVENOUS at 16:40

## 2020-10-07 RX ADMIN — SODIUM CHLORIDE, PRESERVATIVE FREE 10 ML: 5 INJECTION INTRAVENOUS at 13:51

## 2020-10-07 RX ADMIN — WATER 2.2 ML: 1 INJECTION INTRAMUSCULAR; INTRAVENOUS; SUBCUTANEOUS at 13:47

## 2020-10-07 RX ADMIN — ALTEPLASE 2 MG: 2.2 INJECTION, POWDER, LYOPHILIZED, FOR SOLUTION INTRAVENOUS at 13:47

## 2020-10-08 ENCOUNTER — HOSPITAL ENCOUNTER (INPATIENT)
Age: 30
LOS: 16 days | Discharge: OTHER INSTITUTION | DRG: 721 | End: 2020-10-24
Attending: EMERGENCY MEDICINE | Admitting: INTERNAL MEDICINE
Payer: COMMERCIAL

## 2020-10-08 ENCOUNTER — APPOINTMENT (OUTPATIENT)
Dept: GENERAL RADIOLOGY | Age: 30
DRG: 721 | End: 2020-10-08
Payer: COMMERCIAL

## 2020-10-08 PROBLEM — E87.0 TYPE I DIABETES MELLITUS WITH HYPEROSMOLAR COMA (HCC): Status: ACTIVE | Noted: 2020-10-08

## 2020-10-08 PROBLEM — E10.65 TYPE I DIABETES MELLITUS WITH HYPEROSMOLAR COMA (HCC): Status: ACTIVE | Noted: 2020-10-08

## 2020-10-08 PROBLEM — E10.69 TYPE I DIABETES MELLITUS WITH HYPEROSMOLAR COMA (HCC): Status: ACTIVE | Noted: 2020-10-08

## 2020-10-08 PROBLEM — Z99.2 ESRD ON PERITONEAL DIALYSIS (HCC): Chronic | Status: ACTIVE | Noted: 2020-10-08

## 2020-10-08 PROBLEM — E03.9 HYPOTHYROIDISM: Status: ACTIVE | Noted: 2020-10-08

## 2020-10-08 PROBLEM — N18.6 ESRD ON PERITONEAL DIALYSIS (HCC): Chronic | Status: ACTIVE | Noted: 2020-10-08

## 2020-10-08 PROBLEM — E78.5 HYPERLIPIDEMIA: Status: ACTIVE | Noted: 2020-10-08

## 2020-10-08 LAB
ALBUMIN SERPL-MCNC: 2.9 G/DL (ref 3.5–5.2)
ALP BLD-CCNC: 134 U/L (ref 35–104)
ALT SERPL-CCNC: 19 U/L (ref 0–32)
ANION GAP SERPL CALCULATED.3IONS-SCNC: 14 MMOL/L (ref 7–16)
AST SERPL-CCNC: 21 U/L (ref 0–31)
B.E.: -1.2 MMOL/L (ref -3–3)
BACTERIA: ABNORMAL /HPF
BASOPHILS ABSOLUTE: 0.11 E9/L (ref 0–0.2)
BASOPHILS RELATIVE PERCENT: 1.3 % (ref 0–2)
BETA-HYDROXYBUTYRATE: 0.17 MMOL/L (ref 0.02–0.27)
BILIRUB SERPL-MCNC: <0.2 MG/DL (ref 0–1.2)
BILIRUBIN URINE: NEGATIVE
BLOOD, URINE: ABNORMAL
BUN BLDV-MCNC: 37 MG/DL (ref 6–20)
CALCIUM SERPL-MCNC: 8.2 MG/DL (ref 8.6–10.2)
CHLORIDE BLD-SCNC: 93 MMOL/L (ref 98–107)
CHP ED QC CHECK: NORMAL
CLARITY: ABNORMAL
CO2: 24 MMOL/L (ref 22–29)
COHB: 2 % (ref 0–1.5)
COLOR: ABNORMAL
CREAT SERPL-MCNC: 7 MG/DL (ref 0.5–1)
CRITICAL: ABNORMAL
DATE ANALYZED: ABNORMAL
DATE OF COLLECTION: ABNORMAL
EOSINOPHILS ABSOLUTE: 0.26 E9/L (ref 0.05–0.5)
EOSINOPHILS RELATIVE PERCENT: 3 % (ref 0–6)
EPITHELIAL CELLS, UA: ABNORMAL /HPF
GFR AFRICAN AMERICAN: 8
GFR NON-AFRICAN AMERICAN: 8 ML/MIN/1.73
GLUCOSE BLD-MCNC: 358 MG/DL
GLUCOSE BLD-MCNC: 570 MG/DL (ref 74–99)
GLUCOSE URINE: >=1000 MG/DL
HCG(URINE) PREGNANCY TEST: NEGATIVE
HCO3: 23.8 MMOL/L (ref 22–26)
HCT VFR BLD CALC: 33.8 % (ref 34–48)
HEMOGLOBIN: 10.7 G/DL (ref 11.5–15.5)
HEPATITIS B SURFACE ANTIGEN INTERPRETATION: NORMAL
HHB: 3.2 % (ref 0–5)
IMMATURE GRANULOCYTES #: 0.04 E9/L
IMMATURE GRANULOCYTES %: 0.5 % (ref 0–5)
KETONES, URINE: NEGATIVE MG/DL
LAB: ABNORMAL
LACTIC ACID: 2 MMOL/L (ref 0.5–2.2)
LEUKOCYTE ESTERASE, URINE: NEGATIVE
LIPASE: 6 U/L (ref 13–60)
LYMPHOCYTES ABSOLUTE: 1.72 E9/L (ref 1.5–4)
LYMPHOCYTES RELATIVE PERCENT: 20 % (ref 20–42)
Lab: ABNORMAL
MCH RBC QN AUTO: 29.6 PG (ref 26–35)
MCHC RBC AUTO-ENTMCNC: 31.7 % (ref 32–34.5)
MCV RBC AUTO: 93.6 FL (ref 80–99.9)
METER GLUCOSE: 122 MG/DL (ref 74–99)
METER GLUCOSE: 358 MG/DL (ref 74–99)
METER GLUCOSE: >500 MG/DL (ref 74–99)
METHB: 0 % (ref 0–1.5)
MODE: ABNORMAL
MONOCYTES ABSOLUTE: 0.47 E9/L (ref 0.1–0.95)
MONOCYTES RELATIVE PERCENT: 5.5 % (ref 2–12)
NEUTROPHILS ABSOLUTE: 5.98 E9/L (ref 1.8–7.3)
NEUTROPHILS RELATIVE PERCENT: 69.7 % (ref 43–80)
NITRITE, URINE: NEGATIVE
O2 CONTENT: 14.1 ML/DL
O2 SATURATION: 96.7 % (ref 92–98.5)
O2HB: 94.8 % (ref 94–97)
OPERATOR ID: 4473
PATIENT TEMP: 37
PCO2: 41.3 MMHG (ref 35–45)
PDW BLD-RTO: 16.2 FL (ref 11.5–15)
PH BLOOD GAS: 7.38 (ref 7.35–7.45)
PH UA: 6 (ref 5–9)
PLATELET # BLD: 236 E9/L (ref 130–450)
PMV BLD AUTO: 9.8 FL (ref 7–12)
PO2: 98.2 MMHG (ref 75–100)
POTASSIUM REFLEX MAGNESIUM: 3.7 MMOL/L (ref 3.5–5)
PROTEIN UA: 100 MG/DL
RBC # BLD: 3.61 E12/L (ref 3.5–5.5)
RBC UA: ABNORMAL /HPF (ref 0–2)
SODIUM BLD-SCNC: 131 MMOL/L (ref 132–146)
SOURCE, BLOOD GAS: ABNORMAL
SPECIFIC GRAVITY UA: 1.02 (ref 1–1.03)
THB: 10.5 G/DL (ref 11.5–16.5)
TIME ANALYZED: 1843
TOTAL PROTEIN: 5.8 G/DL (ref 6.4–8.3)
UROBILINOGEN, URINE: 0.2 E.U./DL
WBC # BLD: 8.6 E9/L (ref 4.5–11.5)
WBC UA: >20 /HPF (ref 0–5)

## 2020-10-08 PROCEDURE — 2060000000 HC ICU INTERMEDIATE R&B

## 2020-10-08 PROCEDURE — 85025 COMPLETE CBC W/AUTO DIFF WBC: CPT

## 2020-10-08 PROCEDURE — 87088 URINE BACTERIA CULTURE: CPT

## 2020-10-08 PROCEDURE — 6360000002 HC RX W HCPCS: Performed by: INTERNAL MEDICINE

## 2020-10-08 PROCEDURE — 82962 GLUCOSE BLOOD TEST: CPT

## 2020-10-08 PROCEDURE — 82805 BLOOD GASES W/O2 SATURATION: CPT

## 2020-10-08 PROCEDURE — 2580000003 HC RX 258: Performed by: INTERNAL MEDICINE

## 2020-10-08 PROCEDURE — 99223 1ST HOSP IP/OBS HIGH 75: CPT | Performed by: INTERNAL MEDICINE

## 2020-10-08 PROCEDURE — 83690 ASSAY OF LIPASE: CPT

## 2020-10-08 PROCEDURE — 6370000000 HC RX 637 (ALT 250 FOR IP): Performed by: INTERNAL MEDICINE

## 2020-10-08 PROCEDURE — 87077 CULTURE AEROBIC IDENTIFY: CPT

## 2020-10-08 PROCEDURE — 83605 ASSAY OF LACTIC ACID: CPT

## 2020-10-08 PROCEDURE — 87150 DNA/RNA AMPLIFIED PROBE: CPT

## 2020-10-08 PROCEDURE — 87186 SC STD MICRODIL/AGAR DIL: CPT

## 2020-10-08 PROCEDURE — 81025 URINE PREGNANCY TEST: CPT

## 2020-10-08 PROCEDURE — 87040 BLOOD CULTURE FOR BACTERIA: CPT

## 2020-10-08 PROCEDURE — 2580000003 HC RX 258: Performed by: EMERGENCY MEDICINE

## 2020-10-08 PROCEDURE — 81001 URINALYSIS AUTO W/SCOPE: CPT

## 2020-10-08 PROCEDURE — 99284 EMERGENCY DEPT VISIT MOD MDM: CPT

## 2020-10-08 PROCEDURE — 82010 KETONE BODYS QUAN: CPT

## 2020-10-08 PROCEDURE — 93005 ELECTROCARDIOGRAM TRACING: CPT | Performed by: EMERGENCY MEDICINE

## 2020-10-08 PROCEDURE — 71045 X-RAY EXAM CHEST 1 VIEW: CPT

## 2020-10-08 PROCEDURE — 80053 COMPREHEN METABOLIC PANEL: CPT

## 2020-10-08 RX ORDER — METOPROLOL TARTRATE 50 MG/1
100 TABLET, FILM COATED ORAL 2 TIMES DAILY
Status: DISCONTINUED | OUTPATIENT
Start: 2020-10-08 | End: 2020-10-14

## 2020-10-08 RX ORDER — LISINOPRIL 20 MG/1
20 TABLET ORAL DAILY
Status: DISCONTINUED | OUTPATIENT
Start: 2020-10-09 | End: 2020-10-14

## 2020-10-08 RX ORDER — DEXTROSE MONOHYDRATE 50 MG/ML
100 INJECTION, SOLUTION INTRAVENOUS PRN
Status: DISCONTINUED | OUTPATIENT
Start: 2020-10-08 | End: 2020-10-24 | Stop reason: HOSPADM

## 2020-10-08 RX ORDER — SODIUM CHLORIDE 0.9 % (FLUSH) 0.9 %
10 SYRINGE (ML) INJECTION PRN
Status: DISCONTINUED | OUTPATIENT
Start: 2020-10-08 | End: 2020-10-13 | Stop reason: SDUPTHER

## 2020-10-08 RX ORDER — ACETAMINOPHEN 650 MG/1
650 SUPPOSITORY RECTAL EVERY 6 HOURS PRN
Status: DISCONTINUED | OUTPATIENT
Start: 2020-10-08 | End: 2020-10-24 | Stop reason: HOSPADM

## 2020-10-08 RX ORDER — LORAZEPAM 0.5 MG/1
0.5 TABLET ORAL 2 TIMES DAILY PRN
Status: DISCONTINUED | OUTPATIENT
Start: 2020-10-08 | End: 2020-10-22

## 2020-10-08 RX ORDER — GABAPENTIN 100 MG/1
100 CAPSULE ORAL 3 TIMES DAILY
Status: DISCONTINUED | OUTPATIENT
Start: 2020-10-08 | End: 2020-10-22

## 2020-10-08 RX ORDER — HEPARIN SODIUM 5000 [USP'U]/ML
5000 INJECTION, SOLUTION INTRAVENOUS; SUBCUTANEOUS EVERY 8 HOURS SCHEDULED
Status: DISCONTINUED | OUTPATIENT
Start: 2020-10-08 | End: 2020-10-20

## 2020-10-08 RX ORDER — ACETAMINOPHEN 325 MG/1
650 TABLET ORAL EVERY 4 HOURS PRN
Status: DISCONTINUED | OUTPATIENT
Start: 2020-10-08 | End: 2020-10-24 | Stop reason: HOSPADM

## 2020-10-08 RX ORDER — LEVOTHYROXINE SODIUM 0.05 MG/1
50 TABLET ORAL DAILY
Status: DISCONTINUED | OUTPATIENT
Start: 2020-10-09 | End: 2020-10-24 | Stop reason: HOSPADM

## 2020-10-08 RX ORDER — SODIUM CHLORIDE 0.9 % (FLUSH) 0.9 %
10 SYRINGE (ML) INJECTION EVERY 12 HOURS SCHEDULED
Status: DISCONTINUED | OUTPATIENT
Start: 2020-10-08 | End: 2020-10-24 | Stop reason: HOSPADM

## 2020-10-08 RX ORDER — ACETAMINOPHEN 325 MG/1
650 TABLET ORAL EVERY 6 HOURS PRN
Status: DISCONTINUED | OUTPATIENT
Start: 2020-10-08 | End: 2020-10-24 | Stop reason: HOSPADM

## 2020-10-08 RX ORDER — DEXTROSE MONOHYDRATE 25 G/50ML
12.5 INJECTION, SOLUTION INTRAVENOUS PRN
Status: DISCONTINUED | OUTPATIENT
Start: 2020-10-08 | End: 2020-10-24 | Stop reason: HOSPADM

## 2020-10-08 RX ORDER — INSULIN GLARGINE 100 [IU]/ML
15 INJECTION, SOLUTION SUBCUTANEOUS 2 TIMES DAILY
Status: DISCONTINUED | OUTPATIENT
Start: 2020-10-08 | End: 2020-10-09

## 2020-10-08 RX ORDER — PROMETHAZINE HYDROCHLORIDE 25 MG/1
12.5 TABLET ORAL EVERY 6 HOURS PRN
Status: DISCONTINUED | OUTPATIENT
Start: 2020-10-08 | End: 2020-10-22

## 2020-10-08 RX ORDER — INSULIN GLARGINE 100 [IU]/ML
10 INJECTION, SOLUTION SUBCUTANEOUS ONCE
Status: COMPLETED | OUTPATIENT
Start: 2020-10-08 | End: 2020-10-08

## 2020-10-08 RX ORDER — SODIUM CHLORIDE 9 MG/ML
1000 INJECTION, SOLUTION INTRAVENOUS CONTINUOUS
Status: DISCONTINUED | OUTPATIENT
Start: 2020-10-08 | End: 2020-10-09

## 2020-10-08 RX ORDER — GABAPENTIN 100 MG/1
100 CAPSULE ORAL 3 TIMES DAILY
Status: ON HOLD | COMMUNITY
End: 2020-10-24 | Stop reason: HOSPADM

## 2020-10-08 RX ORDER — NICOTINE POLACRILEX 4 MG
15 LOZENGE BUCCAL PRN
Status: DISCONTINUED | OUTPATIENT
Start: 2020-10-08 | End: 2020-10-24 | Stop reason: HOSPADM

## 2020-10-08 RX ORDER — ATORVASTATIN CALCIUM 40 MG/1
40 TABLET, FILM COATED ORAL NIGHTLY
Status: DISCONTINUED | OUTPATIENT
Start: 2020-10-08 | End: 2020-10-24 | Stop reason: HOSPADM

## 2020-10-08 RX ORDER — METOLAZONE 2.5 MG/1
5 TABLET ORAL DAILY
Status: DISCONTINUED | OUTPATIENT
Start: 2020-10-09 | End: 2020-10-24 | Stop reason: HOSPADM

## 2020-10-08 RX ORDER — CLONIDINE HYDROCHLORIDE 0.1 MG/1
0.1 TABLET ORAL 2 TIMES DAILY PRN
Status: DISCONTINUED | OUTPATIENT
Start: 2020-10-08 | End: 2020-10-24 | Stop reason: HOSPADM

## 2020-10-08 RX ORDER — DILTIAZEM HYDROCHLORIDE 240 MG/1
240 CAPSULE, COATED, EXTENDED RELEASE ORAL DAILY
Status: DISCONTINUED | OUTPATIENT
Start: 2020-10-09 | End: 2020-10-24 | Stop reason: HOSPADM

## 2020-10-08 RX ORDER — BUMETANIDE 1 MG/1
2 TABLET ORAL 2 TIMES DAILY
Status: DISCONTINUED | OUTPATIENT
Start: 2020-10-08 | End: 2020-10-13

## 2020-10-08 RX ORDER — HEPARIN SODIUM 5000 [USP'U]/ML
5000 INJECTION, SOLUTION INTRAVENOUS; SUBCUTANEOUS EVERY 8 HOURS SCHEDULED
Status: DISCONTINUED | OUTPATIENT
Start: 2020-10-08 | End: 2020-10-08

## 2020-10-08 RX ORDER — ONDANSETRON 2 MG/ML
4 INJECTION INTRAMUSCULAR; INTRAVENOUS EVERY 6 HOURS PRN
Status: DISCONTINUED | OUTPATIENT
Start: 2020-10-08 | End: 2020-10-22

## 2020-10-08 RX ADMIN — INSULIN GLARGINE 10 UNITS: 100 INJECTION, SOLUTION SUBCUTANEOUS at 18:44

## 2020-10-08 RX ADMIN — HEPARIN SODIUM 5000 UNITS: 5000 INJECTION INTRAVENOUS; SUBCUTANEOUS at 21:43

## 2020-10-08 RX ADMIN — METOPROLOL TARTRATE 100 MG: 50 TABLET, FILM COATED ORAL at 21:42

## 2020-10-08 RX ADMIN — ATORVASTATIN CALCIUM 40 MG: 40 TABLET, FILM COATED ORAL at 21:42

## 2020-10-08 RX ADMIN — WATER 1 G: 1 INJECTION INTRAMUSCULAR; INTRAVENOUS; SUBCUTANEOUS at 18:42

## 2020-10-08 RX ADMIN — GABAPENTIN 100 MG: 100 CAPSULE ORAL at 21:42

## 2020-10-08 RX ADMIN — BUMETANIDE 2 MG: 1 TABLET ORAL at 21:42

## 2020-10-08 RX ADMIN — SODIUM CHLORIDE 1000 ML: 9 INJECTION, SOLUTION INTRAVENOUS at 16:24

## 2020-10-08 RX ADMIN — Medication 10 ML: at 21:43

## 2020-10-08 ASSESSMENT — PAIN DESCRIPTION - LOCATION: LOCATION: BACK

## 2020-10-08 ASSESSMENT — PAIN DESCRIPTION - PAIN TYPE: TYPE: ACUTE PAIN

## 2020-10-08 ASSESSMENT — PAIN SCALES - GENERAL
PAINLEVEL_OUTOF10: 9
PAINLEVEL_OUTOF10: 8

## 2020-10-08 ASSESSMENT — PAIN DESCRIPTION - FREQUENCY: FREQUENCY: CONTINUOUS

## 2020-10-08 ASSESSMENT — PAIN - FUNCTIONAL ASSESSMENT: PAIN_FUNCTIONAL_ASSESSMENT: PREVENTS OR INTERFERES SOME ACTIVE ACTIVITIES AND ADLS

## 2020-10-08 ASSESSMENT — PAIN DESCRIPTION - ORIENTATION: ORIENTATION: LOWER

## 2020-10-08 ASSESSMENT — PAIN DESCRIPTION - DESCRIPTORS: DESCRIPTORS: DISCOMFORT

## 2020-10-08 ASSESSMENT — PAIN DESCRIPTION - ONSET: ONSET: ON-GOING

## 2020-10-08 NOTE — LETTER
[facility] 2 ICU  [address] [facility] [address]  Phone: [address]        October 14, 2020     Patient: Cady Solorzano   Date of Birth: Left intentionally blank    Date of Visit: 10/8/2020       To Whom it May Concern:    Saul Sykes was hospitalized at [facility] on 10/8/2020. She currently is in the intensive care unit. If you have any questions or concerns, please don't hesitate to call.     Sincerely,         Jean-Paul Flower,   [facility]  [phone]

## 2020-10-08 NOTE — ED PROVIDER NOTES
History of Present Illness     Patient Identification  Yash Maldonado is a 31 y.o. female. Patient information was obtained from patient. History/Exam limitations: none. Patient presented to the Emergency Department by private vehicle. Chief Complaint   Hyperglycemia (blood sugar high today vomiting back pain)      Patient presents for evaluation of hyperglycemiana and back pain. Onset of symptoms was gradual 2 days ago, and has been gradually worsening since that time. Other family members affected - patient. There is no prior history of gastrointestinal disease. No known risk factors for parasitic or bacterial infection. Patient is insulin-dependent diabetic was on peritoneal dialysis. She was seen on Monday 3 days ago for low blood sugar in the 40s. Today her blood sugar reads too high for the machine to register. She took her regular Lantus and NovoLog insulin as scheduled. She had 14 units of sliding scale insulin since lunchtime. Sugar remains high. She has some back and flank type pain. She denies urinary urgency or frequency. She takes dialysis at home at night. Denies any change in medication or diet. She denies hematemesis melena or diarrhea. She had no cough or sore throat or fever.     Past Medical History:   Diagnosis Date    Acute congestive heart failure ([address]     BC (acute kidney injury) ([facility] [address] 10/1/2019    Cephalgia 10/9/2019    Chronic kidney disease     Depression     Diabetes mellitus ([address]     Diabetic ketoacidosis ([address] 8/27/2011    Hemodialysis patient ([facility] [address]     History of blood transfusion 11/2019    Iron deficiency anemia 10/1/2019    MDRO (multiple drug resistant organisms) resistance     MRSA (methicillin resistant Staphylococcus aureus)     back wound abcess    Non compliance w medication regimen 3/30/2016    Other disorders of kidney and ureter     Pregnancy 3/30/2016    16 weeks    Severe pre-eclampsia in third trimester 8/22/2016     Family (36.2 °C) (Oral)   Resp 16   Ht 5' 4\" (1.626 m)   Wt 122 lb (55.3 kg)   SpO2 96%   BMI 20.94 kg/m²   /74   Pulse 87   Temp 97.1 °F (36.2 °C) (Oral)   Resp 16   Ht 5' 4\" (1.626 m)   Wt 122 lb (55.3 kg)   SpO2 96%   BMI 20.94 kg/m²     General Appearance:    Alert, cooperative, no distress, appears stated age   Head:    Normocephalic, without obvious abnormality, atraumatic   Eyes:    PERRL, conjunctiva/corneas clear, EOM's intact, fundi     benign, both eyes   Ears:    Normal TM's and external ear canals, both ears   Nose:   Nares normal, septum midline, mucosa normal, no drainage    or sinus tenderness   Throat:   Lips, mucosa, and tongue normal; teeth and gums normal   Neck:   Supple, symmetrical, trachea midline, no adenopathy;     thyroid:  no enlargement/tenderness/nodules; no carotid    bruit or JVD   Back:     Symmetric, no curvature, ROM normal, no CVA tenderness   Lungs:     Clear to auscultation bilaterally, respirations unlabored   Chest Wall:    No tenderness or deformity    Heart:    Regular rate and rhythm, S1 and S2 normal, no murmur, rub   or gallop   Breast Exam:    No tenderness, masses, or nipple abnormality   Abdomen:     Soft, non-tender, bowel sounds active all four quadrants,     no masses, no organomegaly           Extremities:   Extremities normal, atraumatic, no cyanosis or edema   Pulses:   2+ and symmetric all extremities   Skin:   Skin color, texture, turgor normal, no rashes or lesions   Lymph nodes:   Cervical, supraclavicular, and axillary nodes normal   Neurologic:   CNII-XII intact, normal strength, sensation and reflexes     throughout       ED Course          --------------------------------------------- PAST HISTORY ---------------------------------------------  Past Medical History:  has a past medical history of Acute congestive heart failure ([address] BC (acute kidney injury) ([facility] [address] Cephalgia, Chronic kidney disease, Depression, Diabetes mellitus ([facility] [address] Diabetic ketoacidosis ([facility] [address] Hemodialysis patient ([facility] [address] History of blood transfusion, Iron deficiency anemia, MDRO (multiple drug resistant organisms) resistance, MRSA (methicillin resistant Staphylococcus aureus), Non compliance w medication regimen, Other disorders of kidney and ureter, Pregnancy, and Severe pre-eclampsia in third trimester. Past Surgical History:  has a past surgical history that includes ECHO Compl W Dop Color Flow (2012); ECHO Compl W Dop Color Flow (6/10/2013);  section; Tunneled venous port placement (2018); pr esophagogastroduodenoscopy transoral diagnostic (N/A, 2018); back surgery; Colonoscopy (N/A, 2018); Colonoscopy (N/A, 2018); Upper gastrointestinal endoscopy (2018); Upper gastrointestinal endoscopy (N/A, 10/11/2019); Cholecystectomy, laparoscopic (N/A, 2019); and LAPAROSCOPY INSERTION PERITONEAL CATHETER (N/A, 2020). Social History:  reports that she has been smoking cigarettes. She has a 3.00 pack-year smoking history. She uses smokeless tobacco. She reports that she does not drink alcohol or use drugs. Family History: family history includes Asthma in her brother and mother; Diabetes in her mother; High Blood Pressure in her father and mother; Hypertension in her mother. The patients home medications have been reviewed. Allergies:  Toradol [ketorolac tromethamine]    -------------------------------------------------- RESULTS -------------------------------------------------  Labs:  Results for orders placed or performed during the hospital encounter of 10/08/20   POCT Glucose   Result Value Ref Range    Meter Glucose >500 (H) 74 - 99 mg/dL       Radiology:  XR CHEST PORTABLE   Final Result   No evidence of pneumonia or pleural effusion.             ------------------------- NURSING NOTES AND VITALS REVIEWED ---------------------------  Date / Time Roomed:  10/8/2020  3:15 PM  ED Bed Assignment:      The nursing notes within the ED encounter and vital signs as below have been reviewed. /74   Pulse 87   Temp 97.1 °F (36.2 °C) (Oral)   Resp 16   Ht 5' 4\" (1.626 m)   Wt 122 lb (55.3 kg)   SpO2 96%   BMI 20.94 kg/m²   Oxygen Saturation Interpretation: Normal      ------------------------------------------ PROGRESS NOTES ------------------------------------------  I have spoken with the patient and discussed todays results, in addition to providing specific details for the plan of care and counseling regarding the diagnosis and prognosis. Their questions are answered at this time and they are agreeable with the plan. I discussed at length with them reasons for immediate return here for re evaluation. They will followup with primary care by calling their office tomorrow. --------------------------------- ADDITIONAL PROVIDER NOTES ---------------------------------  At this time the patient is without objective evidence of an acute process requiring hospitalization or inpatient management. They have remained hemodynamically stable throughout their entire ED visit and are stable for discharge with outpatient follow-up. The plan has been discussed in detail and they are aware of the specific conditions for emergent return, as well as the importance of follow-up. New Prescriptions    No medications on file       Diagnosis:  1. Acute cystitis without hematuria    2. Hyperglycemia        Disposition:  Patient's disposition: Admit RNF Dr. Elsa Shah  Patient's condition is stable.          Jordyn Bailey MD  10/08/20 5943

## 2020-10-09 LAB
ALBUMIN SERPL-MCNC: 2.8 G/DL (ref 3.5–5.2)
ALP BLD-CCNC: 110 U/L (ref 35–104)
ALT SERPL-CCNC: 16 U/L (ref 0–32)
ANION GAP SERPL CALCULATED.3IONS-SCNC: 12 MMOL/L (ref 7–16)
AST SERPL-CCNC: 20 U/L (ref 0–31)
BASOPHILS ABSOLUTE: 0.12 E9/L (ref 0–0.2)
BASOPHILS RELATIVE PERCENT: 1.3 % (ref 0–2)
BILIRUB SERPL-MCNC: <0.2 MG/DL (ref 0–1.2)
BILIRUBIN DIRECT: <0.2 MG/DL (ref 0–0.3)
BILIRUBIN, INDIRECT: ABNORMAL MG/DL (ref 0–1)
BUN BLDV-MCNC: 34 MG/DL (ref 6–20)
CALCIUM SERPL-MCNC: 8.3 MG/DL (ref 8.6–10.2)
CHLORIDE BLD-SCNC: 98 MMOL/L (ref 98–107)
CO2: 26 MMOL/L (ref 22–29)
CREAT SERPL-MCNC: 6.7 MG/DL (ref 0.5–1)
EKG ATRIAL RATE: 87 BPM
EKG P AXIS: 57 DEGREES
EKG P-R INTERVAL: 134 MS
EKG Q-T INTERVAL: 376 MS
EKG QRS DURATION: 92 MS
EKG QTC CALCULATION (BAZETT): 452 MS
EKG R AXIS: 54 DEGREES
EKG T AXIS: 60 DEGREES
EKG VENTRICULAR RATE: 87 BPM
EOSINOPHILS ABSOLUTE: 0.65 E9/L (ref 0.05–0.5)
EOSINOPHILS RELATIVE PERCENT: 7.2 % (ref 0–6)
GFR AFRICAN AMERICAN: 9
GFR NON-AFRICAN AMERICAN: 9 ML/MIN/1.73
GLUCOSE BLD-MCNC: 37 MG/DL (ref 74–99)
HCT VFR BLD CALC: 30.6 % (ref 34–48)
HEMOGLOBIN: 10 G/DL (ref 11.5–15.5)
IMMATURE GRANULOCYTES #: 0.04 E9/L
IMMATURE GRANULOCYTES %: 0.4 % (ref 0–5)
LYMPHOCYTES ABSOLUTE: 2.8 E9/L (ref 1.5–4)
LYMPHOCYTES RELATIVE PERCENT: 30.8 % (ref 20–42)
MAGNESIUM: 2.3 MG/DL (ref 1.6–2.6)
MCH RBC QN AUTO: 30.1 PG (ref 26–35)
MCHC RBC AUTO-ENTMCNC: 32.7 % (ref 32–34.5)
MCV RBC AUTO: 92.2 FL (ref 80–99.9)
METER GLUCOSE: 134 MG/DL (ref 74–99)
METER GLUCOSE: 149 MG/DL (ref 74–99)
METER GLUCOSE: 191 MG/DL (ref 74–99)
METER GLUCOSE: 200 MG/DL (ref 74–99)
METER GLUCOSE: 417 MG/DL (ref 74–99)
METER GLUCOSE: 53 MG/DL (ref 74–99)
METER GLUCOSE: <40 MG/DL (ref 74–99)
MONOCYTES ABSOLUTE: 0.48 E9/L (ref 0.1–0.95)
MONOCYTES RELATIVE PERCENT: 5.3 % (ref 2–12)
NEUTROPHILS ABSOLUTE: 5 E9/L (ref 1.8–7.3)
NEUTROPHILS RELATIVE PERCENT: 55 % (ref 43–80)
PDW BLD-RTO: 16.3 FL (ref 11.5–15)
PLATELET # BLD: 251 E9/L (ref 130–450)
PMV BLD AUTO: 10.1 FL (ref 7–12)
POTASSIUM REFLEX MAGNESIUM: 3.2 MMOL/L (ref 3.5–5)
RBC # BLD: 3.32 E12/L (ref 3.5–5.5)
SODIUM BLD-SCNC: 136 MMOL/L (ref 132–146)
TOTAL PROTEIN: 5.5 G/DL (ref 6.4–8.3)
WBC # BLD: 9.1 E9/L (ref 4.5–11.5)

## 2020-10-09 PROCEDURE — 6360000002 HC RX W HCPCS: Performed by: INTERNAL MEDICINE

## 2020-10-09 PROCEDURE — 2060000000 HC ICU INTERMEDIATE R&B

## 2020-10-09 PROCEDURE — 82962 GLUCOSE BLOOD TEST: CPT

## 2020-10-09 PROCEDURE — 36415 COLL VENOUS BLD VENIPUNCTURE: CPT

## 2020-10-09 PROCEDURE — 90945 DIALYSIS ONE EVALUATION: CPT | Performed by: INTERNAL MEDICINE

## 2020-10-09 PROCEDURE — 6370000000 HC RX 637 (ALT 250 FOR IP): Performed by: NURSE PRACTITIONER

## 2020-10-09 PROCEDURE — 83735 ASSAY OF MAGNESIUM: CPT

## 2020-10-09 PROCEDURE — 6360000002 HC RX W HCPCS: Performed by: NURSE PRACTITIONER

## 2020-10-09 PROCEDURE — 6370000000 HC RX 637 (ALT 250 FOR IP): Performed by: INTERNAL MEDICINE

## 2020-10-09 PROCEDURE — 2580000003 HC RX 258: Performed by: INTERNAL MEDICINE

## 2020-10-09 PROCEDURE — 99233 SBSQ HOSP IP/OBS HIGH 50: CPT | Performed by: INTERNAL MEDICINE

## 2020-10-09 PROCEDURE — P9047 ALBUMIN (HUMAN), 25%, 50ML: HCPCS | Performed by: NURSE PRACTITIONER

## 2020-10-09 PROCEDURE — 80076 HEPATIC FUNCTION PANEL: CPT

## 2020-10-09 PROCEDURE — 85025 COMPLETE CBC W/AUTO DIFF WBC: CPT

## 2020-10-09 PROCEDURE — 80048 BASIC METABOLIC PNL TOTAL CA: CPT

## 2020-10-09 PROCEDURE — 3E1M39Z IRRIGATION OF PERITONEAL CAVITY USING DIALYSATE, PERCUTANEOUS APPROACH: ICD-10-PCS | Performed by: INTERNAL MEDICINE

## 2020-10-09 RX ORDER — HYDROCODONE BITARTRATE AND ACETAMINOPHEN 5; 325 MG/1; MG/1
1 TABLET ORAL EVERY 4 HOURS PRN
Status: DISCONTINUED | OUTPATIENT
Start: 2020-10-09 | End: 2020-10-16

## 2020-10-09 RX ORDER — INSULIN GLARGINE 100 [IU]/ML
10 INJECTION, SOLUTION SUBCUTANEOUS EVERY MORNING
Status: DISCONTINUED | OUTPATIENT
Start: 2020-10-10 | End: 2020-10-17

## 2020-10-09 RX ORDER — LOPERAMIDE HYDROCHLORIDE 2 MG/1
2 CAPSULE ORAL ONCE
Status: COMPLETED | OUTPATIENT
Start: 2020-10-09 | End: 2020-10-09

## 2020-10-09 RX ORDER — ALBUMIN (HUMAN) 12.5 G/50ML
25 SOLUTION INTRAVENOUS EVERY 8 HOURS
Status: COMPLETED | OUTPATIENT
Start: 2020-10-09 | End: 2020-10-10

## 2020-10-09 RX ORDER — DEXTROSE MONOHYDRATE 100 MG/ML
INJECTION, SOLUTION INTRAVENOUS CONTINUOUS
Status: DISPENSED | OUTPATIENT
Start: 2020-10-09 | End: 2020-10-09

## 2020-10-09 RX ORDER — GRANULES FOR ORAL 3 G/1
3 POWDER ORAL ONCE
Status: COMPLETED | OUTPATIENT
Start: 2020-10-09 | End: 2020-10-09

## 2020-10-09 RX ORDER — HYDROCODONE BITARTRATE AND ACETAMINOPHEN 5; 325 MG/1; MG/1
2 TABLET ORAL EVERY 4 HOURS PRN
Status: DISCONTINUED | OUTPATIENT
Start: 2020-10-09 | End: 2020-10-16

## 2020-10-09 RX ORDER — POTASSIUM CHLORIDE 20 MEQ/1
20 TABLET, EXTENDED RELEASE ORAL ONCE
Status: COMPLETED | OUTPATIENT
Start: 2020-10-09 | End: 2020-10-09

## 2020-10-09 RX ADMIN — GABAPENTIN 100 MG: 100 CAPSULE ORAL at 13:53

## 2020-10-09 RX ADMIN — INSULIN LISPRO 12 UNITS: 100 INJECTION, SOLUTION INTRAVENOUS; SUBCUTANEOUS at 12:22

## 2020-10-09 RX ADMIN — BUMETANIDE 2 MG: 1 TABLET ORAL at 21:42

## 2020-10-09 RX ADMIN — LOPERAMIDE HYDROCHLORIDE 2 MG: 2 CAPSULE ORAL at 00:49

## 2020-10-09 RX ADMIN — ATORVASTATIN CALCIUM 40 MG: 40 TABLET, FILM COATED ORAL at 21:43

## 2020-10-09 RX ADMIN — DEXTROSE MONOHYDRATE: 100 INJECTION, SOLUTION INTRAVENOUS at 07:23

## 2020-10-09 RX ADMIN — HEPARIN SODIUM 5000 UNITS: 5000 INJECTION INTRAVENOUS; SUBCUTANEOUS at 06:17

## 2020-10-09 RX ADMIN — METOPROLOL TARTRATE 100 MG: 50 TABLET, FILM COATED ORAL at 09:51

## 2020-10-09 RX ADMIN — Medication 15 G: at 06:16

## 2020-10-09 RX ADMIN — HEPARIN SODIUM 5000 UNITS: 5000 INJECTION INTRAVENOUS; SUBCUTANEOUS at 13:53

## 2020-10-09 RX ADMIN — METOLAZONE 5 MG: 2.5 TABLET ORAL at 09:51

## 2020-10-09 RX ADMIN — INSULIN LISPRO 2 UNITS: 100 INJECTION, SOLUTION INTRAVENOUS; SUBCUTANEOUS at 15:55

## 2020-10-09 RX ADMIN — ALBUMIN (HUMAN) 25 G: 0.25 INJECTION, SOLUTION INTRAVENOUS at 18:17

## 2020-10-09 RX ADMIN — BUMETANIDE 2 MG: 1 TABLET ORAL at 09:51

## 2020-10-09 RX ADMIN — GABAPENTIN 100 MG: 100 CAPSULE ORAL at 21:43

## 2020-10-09 RX ADMIN — INSULIN HUMAN 5 UNITS: 100 INJECTION, SUSPENSION SUBCUTANEOUS at 21:44

## 2020-10-09 RX ADMIN — GABAPENTIN 100 MG: 100 CAPSULE ORAL at 09:51

## 2020-10-09 RX ADMIN — ALBUMIN (HUMAN) 25 G: 0.25 INJECTION, SOLUTION INTRAVENOUS at 12:22

## 2020-10-09 RX ADMIN — INSULIN GLARGINE 15 UNITS: 100 INJECTION, SOLUTION SUBCUTANEOUS at 09:52

## 2020-10-09 RX ADMIN — Medication 10 ML: at 21:44

## 2020-10-09 RX ADMIN — METOPROLOL TARTRATE 100 MG: 50 TABLET, FILM COATED ORAL at 21:43

## 2020-10-09 RX ADMIN — LOPERAMIDE HYDROCHLORIDE 2 MG: 2 CAPSULE ORAL at 22:58

## 2020-10-09 RX ADMIN — INSULIN LISPRO 4 UNITS: 100 INJECTION, SOLUTION INTRAVENOUS; SUBCUTANEOUS at 09:52

## 2020-10-09 RX ADMIN — POTASSIUM CHLORIDE 20 MEQ: 1500 TABLET, EXTENDED RELEASE ORAL at 12:22

## 2020-10-09 RX ADMIN — EPOETIN ALFA-EPBX 8000 UNITS: 4000 INJECTION, SOLUTION INTRAVENOUS; SUBCUTANEOUS at 16:32

## 2020-10-09 RX ADMIN — HYDROCODONE BITARTRATE AND ACETAMINOPHEN 2 TABLET: 5; 325 TABLET ORAL at 18:16

## 2020-10-09 RX ADMIN — LEVOTHYROXINE SODIUM 50 MCG: 50 TABLET ORAL at 06:16

## 2020-10-09 RX ADMIN — FOSFOMYCIN TROMETHAMINE 1 PACKET: 3 POWDER ORAL at 19:03

## 2020-10-09 RX ADMIN — LISINOPRIL 20 MG: 20 TABLET ORAL at 09:51

## 2020-10-09 RX ADMIN — DILTIAZEM HYDROCHLORIDE 240 MG: 240 CAPSULE, COATED, EXTENDED RELEASE ORAL at 09:51

## 2020-10-09 RX ADMIN — HEPARIN SODIUM 5000 UNITS: 5000 INJECTION INTRAVENOUS; SUBCUTANEOUS at 21:44

## 2020-10-09 ASSESSMENT — PAIN SCALES - GENERAL
PAINLEVEL_OUTOF10: 0
PAINLEVEL_OUTOF10: 9
PAINLEVEL_OUTOF10: 7
PAINLEVEL_OUTOF10: 8
PAINLEVEL_OUTOF10: 4
PAINLEVEL_OUTOF10: 3

## 2020-10-09 ASSESSMENT — PAIN DESCRIPTION - DESCRIPTORS
DESCRIPTORS: DISCOMFORT;SORE
DESCRIPTORS: CONSTANT;DISCOMFORT;SHARP

## 2020-10-09 ASSESSMENT — PAIN DESCRIPTION - PROGRESSION: CLINICAL_PROGRESSION: NOT CHANGED

## 2020-10-09 ASSESSMENT — PAIN DESCRIPTION - LOCATION
LOCATION: BACK
LOCATION: BACK

## 2020-10-09 ASSESSMENT — PAIN DESCRIPTION - PAIN TYPE
TYPE: ACUTE PAIN
TYPE: CHRONIC PAIN

## 2020-10-09 ASSESSMENT — PAIN DESCRIPTION - ORIENTATION: ORIENTATION: LOWER

## 2020-10-09 ASSESSMENT — PAIN DESCRIPTION - FREQUENCY: FREQUENCY: CONTINUOUS

## 2020-10-09 ASSESSMENT — PAIN DESCRIPTION - ONSET: ONSET: ON-GOING

## 2020-10-09 NOTE — FLOWSHEET NOTE
ccpd initiated without difficulty, cath care per policy/procedure, dressing changed.  Pt tolerated well

## 2020-10-09 NOTE — FLOWSHEET NOTE
Cath care per procedure. Effluent pale yellow and clear with no sediment present. Dressing dry and intact. Pt le well.  UF: 503

## 2020-10-09 NOTE — PROGRESS NOTES
Pt blood sugar this morning read RR LO on glucometer. Labs drawn 5 minutes prior to glucometer test. Lab draw read BGL of 37. Pt given 2 tubes oral glucose and orange juice, BGL rechecked and was 58. Pt given another tube of glucose and Dr. Roni Sanchez notified. D10 @ 100/hr x 10 hrs ordered.

## 2020-10-09 NOTE — CARE COORDINATION
MICHEL Note: 10/9/2020 at 4:02pm: COVID test not done. CM talked to the patient for transition of care. States she lives in an apartment with her two children, ages 1 & 3. She states her mom is watching her children now. States she has not been very independent with her ADL's as she has been weak. States her mom is a nurse and she does assist her. States she doesn't walk well and most of the time, and  uses a WC. States she has been on peritoneal dialysis for about 6 months. She states a company called [employer] delivers all her supplies and she does follow with [facility]. DME: WC. No hx of HHC or SNF. PCP: Dr. Slava Weeks. Phamacy: [facility] [address]. In [facility]. States her plans are to return home and her mom or boyfriend will pick her up.  [place] RN

## 2020-10-09 NOTE — PROGRESS NOTES
[place] [address] Hospitalist   Progress Note    Admitting Date and Time: 10/8/2020  3:15 PM  Admit Dx: Type I diabetes mellitus with hyperosmolar coma ([facility] [address] [E10.69, E10.65]    Subjective:    Pt feels sore in right lower back. She does not have much of appetite. She tells me saw endocrinologist end of September and he changed her from bid Lantus to Lantus in AM and NPH at night(as she does peritoneal dialysis at night). She feels like she has UTI. Per RN: asking for something more than Tylenol for pain.        albumin human  25 g Intravenous Q8H    epoetin nena-epbx  8,000 Units Subcutaneous Once per day on Mon Wed Fri    sodium chloride flush  10 mL Intravenous 2 times per day    atorvastatin  40 mg Oral Nightly    bumetanide  2 mg Oral BID    dilTIAZem  240 mg Oral Daily    gabapentin  100 mg Oral TID    insulin glargine  15 Units Subcutaneous BID    levothyroxine  50 mcg Oral Daily    lisinopril  20 mg Oral Daily    metOLazone  5 mg Oral Daily    metoprolol  100 mg Oral BID    sodium chloride flush  10 mL Intravenous 2 times per day    heparin (porcine)  5,000 Units Subcutaneous 3 times per day    insulin lispro  0-12 Units Subcutaneous TID WC    insulin lispro  0-6 Units Subcutaneous Nightly     sodium chloride flush, 10 mL, PRN  acetaminophen, 650 mg, Q4H PRN  cloNIDine, 0.1 mg, BID PRN  LORazepam, 0.5 mg, BID PRN  sodium chloride flush, 10 mL, PRN  acetaminophen, 650 mg, Q6H PRN    Or  acetaminophen, 650 mg, Q6H PRN  magnesium hydroxide, 30 mL, Daily PRN  promethazine, 12.5 mg, Q6H PRN    Or  ondansetron, 4 mg, Q6H PRN  glucose, 15 g, PRN  dextrose, 12.5 g, PRN  glucagon (rDNA), 1 mg, PRN  dextrose, 100 mL/hr, PRN         Objective:    /68   Pulse 90   Temp 98.7 °F (37.1 °C) (Oral)   Resp 16   Ht 5' 4\" (1.626 m)   Wt 122 lb (55.3 kg)   SpO2 98%   BMI 20.94 kg/m²   General Appearance: alert and oriented to person, place and time and in no acute distress but does grimace with movement in regards to lower back. Skin: warm and dry  Head: normocephalic and atraumatic  Eyes: pupils equal, round, and reactive to light, extraocular eye movements intact, conjunctivae normal  Neck: neck supple and non tender without mass   Pulmonary/Chest: clear to auscultation bilaterally- no wheezes, rales or rhonchi, normal air movement, no respiratory distress  Cardiovascular: normal rate, normal S1 and S2 and no carotid bruits  Abdomen: soft, non-tender, non-distended, normal bowel sounds, no masses or organomegaly   Extremities: no cyanosis, no clubbing and no edema  Neurologic: no cranial nerve deficit and speech normal      Recent Labs     10/07/20  1550 10/08/20  1526 10/08/20  1756 10/09/20  0536    131*  --  136   K 3.5 3.7  --  3.2*   CL 98 93*  --  98   CO2 24 24  --  26   BUN 30* 37*  --  34*   CREATININE 7.2* 7.0*  --  6.7*   GLUCOSE 264* 570* 358 37*   CALCIUM 8.4* 8.2*  --  8.3*       Recent Labs     10/07/20  1550 10/08/20  1526 10/09/20  0536   ALKPHOS 112* 134* 110*   PROT 5.6* 5.8* 5.5*   LABALBU 2.6* 2.9* 2.8*   BILITOT <0.2 <0.2 <0.2   AST 21 21 20   ALT 15 19 16       Recent Labs     10/08/20  1526 10/09/20  0536   WBC 8.6 9.1   RBC 3.61 3.32*   HGB 10.7* 10.0*   HCT 33.8* 30.6*   MCV 93.6 92.2   MCH 29.6 30.1   MCHC 31.7* 32.7   RDW 16.2* 16.3*    251   MPV 9.8 10.1       Radiology:   XR CHEST PORTABLE   Final Result   No evidence of pneumonia or pleural effusion. Assessment:  Principal Problem:    Uncontrolled type 1 diabetes mellitus with hyperglycemia, with long-term current use of insulin (HCC)  Active Problems:    Hypertension    ESRD on peritoneal dialysis ([facility] [address]    Hypothyroidism    Hyperlipidemia  Resolved Problems:    * No resolved hospital problems. *      Plan:    1. Uncontrolled type I DM with hyperglycemia as well as hypoglycemia--she did received Lantus in ER as well as higher dose of Lantus this morning at 15 units.  I will place back on regimen of Lantus 10 units in AM, NPH 5 units at night and Novolog 3 units with meals plus SSI. See excerpt from Dr. Kerri Rogel note:   · Patient's diabetes is uncontrol complicated with both macro and microvascular complications   · Will change DM regimen to Lantus 10 units daily in am, Novolog 3 units with meals + sliding scale 1:50>150, NPH 5 units at night to cover peritoneal dialysis   · The patient was advised to continue checking blood sugars 4 times a day before meals and at bedtime and send BS readings to our office in a week. · Will order insulin pump and CGM to help with better DM control   · Patient will need routine diabetes maintenance and prevention  · The patient was referred to diabetic educator for further teaching on carb counting   2. UTI--patient given ceftriaxone 1g in ER. Urine Cx showing GPC and I suspect will likely be Enterococcus. Therefore, decided to treat with one dose of Fosfomycin while awaiting cultures. 3. Low back pain-heating pad and Norco prn.  4. ESRD on PD--gets nightly PD treatment. Nephrology consulted and following. Case discussed with nurse at bedside. NOTE: This report was transcribed using voice recognition software. Every effort was made to ensure accuracy; however, inadvertent computerized transcription errors may be present.      Electronically signed by Howard Van MD on 10/9/2020 at 5:48 PM              \

## 2020-10-09 NOTE — CONSULTS
Department of Internal Medicine  Nephrology Consult Note      Reason for Consult:  PD  Requesting Physician:  Dr. Nick Coyle:  Hyperglycemia    History Obtained From:  patient, electronic medical record    HISTORY OF PRESENT ILLNESS:      Briefly, Glory Christensen is a 31year-old female with history of ESRD on PD, with severe proteinuria, poorly controlled type I DM with multiple admissions for DKA, gastroparesis, HTN who presented to the ER on 10/8/2020 with complaints of hyperglycemia, vomiting and back pain. She reported that these symptoms have been present for approximately 1 month and that they had been getting worse over the last two days. She was admitted for further evaluation and treatment. We are consulted for management of ESRD and PD.     Past Medical History:        Diagnosis Date    Acute congestive heart failure ([address]     BC (acute kidney injury) ([address] 10/1/2019    Cephalgia 10/9/2019    Chronic kidney disease     Depression     Diabetes mellitus ([address]     Diabetic ketoacidosis ([address] 2011    [facility]     History of blood transfusion 2019    Hyperlipidemia 10/8/2020    Hypothyroidism 10/8/2020    Iron deficiency anemia 10/1/2019    MDRO (multiple drug resistant organisms) resistance     MRSA (methicillin resistant Staphylococcus aureus)     back wound abcess    Non compliance w medication regimen 3/30/2016    Other disorders of kidney and ureter     Pregnancy 3/30/2016    16 weeks    Severe pre-eclampsia in third trimester 2016     Past Surgical History:        Procedure Laterality Date    BACK SURGERY      abscess     SECTION      x2    CHOLECYSTECTOMY, LAPAROSCOPIC N/A 2019    CHOLECYSTECTOMY LAPAROSCOPIC performed by Loida Simon MD at [address] N/A 2018    COLONOSCOPY WITH BIOPSY performed by Rosa Peacock MD at [address] [facility] COLONOSCOPY N/A 2018    COLONOSCOPY WITH BIOPSY performed by Roberto Duque MD at [address] [facility] ECHO [address]  1/31/2012    EF 57%    ECHO COMPL W DOP COLOR FLOW  6/10/2013         LAPAROSCOPY INSERTION PERITONEAL CATHETER N/A 6/26/2020    LAPAROSCOPIC INSERTION PERITONEAL DIALYSIS CATHETER performed by Kash Vences MD at [facility] ESOPHAGOGASTRODUODENOSCOPY TRANSORAL DIAGNOSTIC N/A 5/30/2018    EGD ESOPHAGOGASTRODUODENOSCOPY performed by Ema Olmstead MD at [address] [facility] TUNNELED VENOUS PORT PLACEMENT  04/2018    UPPER GASTROINTESTINAL ENDOSCOPY  12/18/2018    EGD BIOPSY performed by Roberto Duque MD at [address] N/A 10/11/2019    EGD ESOPHAGOGASTRODUODENOSCOPY performed by Diana Aguirre DO at [place] [address]     Current Medications:    Current Facility-Administered Medications: dextrose 10 % infusion, , Intravenous, Continuous  albumin human 25 % IV solution 25 g, 25 g, Intravenous, Q8H  sodium chloride flush 0.9 % injection 10 mL, 10 mL, Intravenous, 2 times per day  sodium chloride flush 0.9 % injection 10 mL, 10 mL, Intravenous, PRN  acetaminophen (TYLENOL) tablet 650 mg, 650 mg, Oral, Q4H PRN  atorvastatin (LIPITOR) tablet 40 mg, 40 mg, Oral, Nightly  bumetanide (BUMEX) tablet 2 mg, 2 mg, Oral, BID  cloNIDine (CATAPRES) tablet 0.1 mg, 0.1 mg, Oral, BID PRN  dilTIAZem (CARDIZEM CD) extended release capsule 240 mg, 240 mg, Oral, Daily  gabapentin (NEURONTIN) capsule 100 mg, 100 mg, Oral, TID  insulin glargine (LANTUS) injection vial 15 Units, 15 Units, Subcutaneous, BID  levothyroxine (SYNTHROID) tablet 50 mcg, 50 mcg, Oral, Daily  lisinopril (PRINIVIL;ZESTRIL) tablet 20 mg, 20 mg, Oral, Daily  LORazepam (ATIVAN) tablet 0.5 mg, 0.5 mg, Oral, BID PRN  metOLazone (ZAROXOLYN) tablet 5 mg, 5 mg, Oral, Daily  metoprolol tartrate (LOPRESSOR) tablet 100 mg, 100 mg, Oral, BID  sodium chloride flush 0.9 % injection 10 mL, 10 mL, Intravenous, 2 times per day  sodium chloride flush 0.9 % injection 10 mL, 10 mL, Intravenous, PRN  acetaminophen (TYLENOL) tablet 650 mg, 650 mg, Oral, Q6H PRN **OR** acetaminophen (TYLENOL) suppository 650 mg, 650 mg, Rectal, Q6H PRN  magnesium hydroxide (MILK OF MAGNESIA) 400 MG/5ML suspension 30 mL, 30 mL, Oral, Daily PRN  promethazine (PHENERGAN) tablet 12.5 mg, 12.5 mg, Oral, Q6H PRN **OR** ondansetron (ZOFRAN) injection 4 mg, 4 mg, Intravenous, Q6H PRN  heparin (porcine) injection 5,000 Units, 5,000 Units, Subcutaneous, 3 times per day  glucose (GLUTOSE) 40 % oral gel 15 g, 15 g, Oral, PRN  dextrose 50 % IV solution, 12.5 g, Intravenous, PRN  glucagon (rDNA) injection 1 mg, 1 mg, Intramuscular, PRN  dextrose 5 % solution, 100 mL/hr, Intravenous, PRN  insulin lispro (HUMALOG) injection vial 0-12 Units, 0-12 Units, Subcutaneous, TID WC  insulin lispro (HUMALOG) injection vial 0-6 Units, 0-6 Units, Subcutaneous, Nightly  Allergies: Toradol [ketorolac tromethamine]    Social History:    TOBACCO:   reports that she has been smoking cigarettes. She has a 3.00 pack-year smoking history. She uses smokeless tobacco.  ETOH:   reports no history of alcohol use.     Family History:       Problem Relation Age of Onset   Connye Sole Asthma Mother     Hypertension Mother     High Blood Pressure Mother     Diabetes Mother     Asthma Brother     High Blood Pressure Father      REVIEW OF SYSTEMS:    CONSTITUTIONAL:  negative for  fevers and chills  EYES:  negative for  double vision and blurred vision  HEENT:  negative for  hearing loss and epistaxis  RESPIRATORY:  negative for  dry cough, dyspnea and wheezing  CARDIOVASCULAR:  negative for  chest pain, palpitations  GASTROINTESTINAL:  negative for nausea, vomiting and diarrhea  GENITOURINARY:  negative for frequency and dysuria  INTEGUMENT/BREAST:  negative for rash and skin lesion(s)  HEMATOLOGIC/LYMPHATIC:  negative for easy bruising and lymphadenopathy  ALLERGIC/IMMUNOLOGIC:  negative for recurrent infections and urticaria  ENDOCRINE:  negative for tremor and weight changes  MUSCULOSKELETAL:  negative for  myalgias and arthralgias  NEUROLOGICAL:  negative for headaches and dizziness  PHYSICAL EXAM:      Vitals:    VITALS:  /87   Pulse 87   Temp 98.5 °F (36.9 °C) (Infrared)   Resp 16   Ht 5' 4\" (1.626 m)   Wt 122 lb (55.3 kg)   SpO2 100%   BMI 20.94 kg/m²   24HR INTAKE/OUTPUT:    Intake/Output Summary (Last 24 hours) at 10/9/2020 1337  Last data filed at 10/9/2020 1009  Gross per 24 hour   Intake 240 ml   Output --   Net 240 ml       Constitutional:  Alert and oriented, chronically ill  HEENT:  Normocephalic, PERRL  Respiratory:  CTA bilaterally, on room air  Cardiovascular/Edema:  RRR,S1/S2  Gastrointestinal:  Soft, nondistended, nontender, bowel sounds active, PD catheter intact and clean  Neurologic:  Nonfocal, AVELAR  Skin:  Warm, dry, no lesions  Other:  No edema, L chest mediport    DATA:    CBC with Differential:    Lab Results   Component Value Date    WBC 9.1 10/09/2020    RBC 3.32 10/09/2020    HGB 10.0 10/09/2020    HCT 30.6 10/09/2020     10/09/2020    MCV 92.2 10/09/2020    MCH 30.1 10/09/2020    MCHC 32.7 10/09/2020    RDW 16.3 10/09/2020    NRBC 0.9 08/10/2020    SEGSPCT 67 06/27/2013    METASPCT 1.7 08/10/2020    LYMPHOPCT 30.8 10/09/2020    MONOPCT 5.3 10/09/2020    BASOPCT 1.3 10/09/2020    MONOSABS 0.48 10/09/2020    LYMPHSABS 2.80 10/09/2020    EOSABS 0.65 10/09/2020    BASOSABS 0.12 10/09/2020     CMP:    Lab Results   Component Value Date     10/09/2020    K 3.2 10/09/2020    CL 98 10/09/2020    CO2 26 10/09/2020    BUN 34 10/09/2020    CREATININE 6.7 10/09/2020    GFRAA 9 10/09/2020    LABGLOM 9 10/09/2020    GLUCOSE 37 10/09/2020    GLUCOSE 130 05/18/2012    PROT 5.5 10/09/2020    LABALBU 2.8 10/09/2020    LABALBU 4.1 05/18/2012    CALCIUM 8.3 10/09/2020    BILITOT <0.2 10/09/2020    ALKPHOS 110 10/09/2020    AST 20 10/09/2020    ALT 16 10/09/2020     Magnesium:    Lab Results   Component Value Date    MG 2.3 10/09/2020     Phosphorus:    Lab Results   Component Value Date    PHOS 6.1 10/07/2020     Radiology Review:    CXR 10/8/2020    No evidence of pneumonia or pleural effusion. IMPRESSION/RECOMMENDATIONS:      Briefly, Isabel Oneill is a 31year-old female with history of ESRD on PD, with severe proteinuria, poorly controlled type I DM with multiple admissions for DKA, gastroparesis, HTN who presented to the ER on 10/8/2020 with complaints of hyperglycemia, vomiting and back pain. She reported that these symptoms have been present for approximately 1 month and that they had been getting worse over the last two days. She was admitted for further evaluation and treatment. We are consulted for management of ESRD and PD.      1. ESRD on PD, with severe proteinuria, nephrotic range, due to diabetic nephropathy. To continue with home PD Rx.    2. Hypokalemia 2/2 clearance with PD and bumex/metolazone. For careful supplementation. 3. Type I DM, poorly controlled, glucose on admission 264; glucose levels labile. On SSI and lantus. 4. Hx Intractable nausea and vomiting 2/2 diabetic gastroparesis, denies complaints of both today. 5. Hx Chronic diarrhea, diabetic enteropathy, small intestinal bacterial overgrowth?, celiac sprue?, microscopic colitis? 6. Asymptomatic bacteriuria, culture with GPC  2. HTN, on metoprolol, Bumex, metolazone and diltiazem  3. HLD, on atorvastatin  4. Hypoalbuminemia 2/2 poor nutrition. For supplementation  5. Anemia of CKD, to resume retacrit  8,000 units tiw     Plan:     · Albumin 25 g IV Q8 hours x 3 doses  · Potassium 20 mEq PO x 1  · Continue CCPD 2 liters exchanges of 1.5% x 4 (every 2 hours)  · Resume MARIA TERESA      Thank you Dr. Valerie Gibson for this consultation. Electronically signed by HEBER Snyder - CNP on 10/9/2020 at 1:28 PM  Patient seen and examined and agree with above.     Iglesia Fuentes MD

## 2020-10-09 NOTE — FLOWSHEET NOTE
Cycler bedside, set up using all 1.5% Dextrose solution for 4 exchanges, 2 liters each exchange every 2 hours, no last fill. PD catheter accessed, conversion tubing sterilely attached to adapt to [place]. CCPD initiated w/o complications. Bypassed 1st drain after only 23 ml drained (belly dry) and successfully proceeded to 1st fill . No c/o. [facility] RNBruno, informed treatment initiated.

## 2020-10-09 NOTE — H&P
10 Dion Bhatti: had concerns including Hyperglycemia (blood sugar high today vomiting back pain). History of Present Illness:    Informant(s) for H&P:Pt and chart   1010 Evert Maldonado is a 31 y.o. female with PMH of (ESRD on peritoneal dialysis, IDDM) presented to the ER in [address] [place] [address] Road with 1 month history of not feeling well, got worse the last two days, has been vomiting few times, also low back pain. Patient has been compliant on her insulin but her glucose always high.   + ARIAS.  Denied any fever, diarrhea, abdominal pain, blood in stool or black stool.     Denied any chest pain or SOB, no recent lightheadedness or syncope     In ER:    Vitals BP (!) 145/87   Pulse 83   Temp 97.8 °F (36.6 °C) (Oral)   Resp 14   Ht 5' 4\" (1.626 m)   Wt 122 lb (55.3 kg)   SpO2 100%   BMI 20.94 kg/m²   WBC, neutrophil %, neutrophil absolute count   Lab Results   Component Value Date    WBC 8.6 10/08/2020    NEUTOPHILPCT 69.7 10/08/2020    NEUTROABS 5.98 10/08/2020    Lactic acid   Lab Results   Component Value Date    LACTSEPSIS 1.1 10/30/2019     Cr   Lab Results   Component Value Date    CREATININE 7.0 (H) 10/08/2020       REVIEW OF SYSTEMS:  A comprehensive review of systems was negative except for: what is in the HPI    PMH:  Past Medical History:   Diagnosis Date    Acute congestive heart failure ([address]     BC (acute kidney injury) ([address] 10/1/2019    Cephalgia 10/9/2019    Chronic kidney disease     Depression     Diabetes mellitus ([address]     Diabetic ketoacidosis ([address] 8/27/2011    Hemodialysis patient ([address]     History of blood transfusion 11/2019    Hyperlipidemia 10/8/2020    Hypothyroidism 10/8/2020    Iron deficiency anemia 10/1/2019    MDRO (multiple drug resistant organisms) resistance     MRSA (methicillin resistant Staphylococcus aureus)     back wound abcess    Non compliance w medication regimen 3/30/2016    Other disorders of kidney and ureter     Pregnancy 3/30/2016    16 weeks    Severe pre-eclampsia in third trimester 2016       Surgical History:  Past Surgical History:   Procedure Laterality Date    BACK SURGERY      abscess     SECTION      x2    CHOLECYSTECTOMY, LAPAROSCOPIC N/A 2019    CHOLECYSTECTOMY LAPAROSCOPIC performed by Jp Hardin MD at [facility] N/A 2018    COLONOSCOPY WITH BIOPSY performed by Driss Velasquez MD at [address] N/A 2018    COLONOSCOPY WITH BIOPSY performed by Prabhakar Garcia MD at [address]  2012    EF 57%    ECHO COMPL W DOP COLOR FLOW  6/10/2013         LAPAROSCOPY INSERTION PERITONEAL CATHETER N/A 2020    LAPAROSCOPIC INSERTION PERITONEAL DIALYSIS CATHETER performed by Delray Dakin, MD at [facility] ESOPHAGOGASTRODUODENOSCOPY TRANSORAL DIAGNOSTIC N/A 2018    EGD ESOPHAGOGASTRODUODENOSCOPY performed by Driss Velasquez MD at [address] [facility] TUNNELED VENOUS PORT PLACEMENT  2018    UPPER GASTROINTESTINAL ENDOSCOPY  2018    EGD BIOPSY performed by Prabhakar Garcia MD at [address] [facility] [address] 10/11/2019    EGD ESOPHAGOGASTRODUODENOSCOPY performed by Rafita Brewer DO at [place] [address]       Medications Prior to Admission:    Prior to Admission medications    Medication Sig Start Date End Date Taking? Authorizing Provider   gabapentin (NEURONTIN) 100 MG capsule Take 100 mg by mouth 3 times daily.    Yes Historical Provider, MD   bumetanide (BUMEX) 2 MG tablet Take 2 mg by mouth 2 times daily   Yes Historical Provider, MD   metOLazone (ZAROXOLYN) 5 MG tablet Take 5 mg by mouth daily   Yes Historical Provider, MD   vitamin D (ERGOCALCIFEROL) 1.25 MG (23655 UT) CAPS capsule Take 50,000 Units by mouth once a week Saturday   Yes Historical Provider, MD   Multiple Vitamins-Minerals (RENAPLEX-D) TABS Take 1 tablet by mouth daily   Yes Historical Provider, MD   cloNIDine (CATAPRES) 0.1 MG tablet Take 0.1 mg by mouth 2 times daily as needed for High Blood Pressure   Yes Historical Provider, MD   insulin aspart (NOVOLOG FLEXPEN) 100 UNIT/ML injection pen Inject 3 Units into the skin 3 times daily (before meals) *Plus Sliding Scale*   Yes Historical Provider, MD   insulin glargine (LANTUS SOLOSTAR) 100 UNIT/ML injection pen Inject 10 Units into the skin 2 times daily 9/8/20  Yes Amado Avery DO   metoclopramide (REGLAN) 5 MG tablet Take 1 tablet by mouth 3 times daily (with meals) 9/3/20  Yes Luiz Pemberton MD   lisinopril (PRINIVIL;ZESTRIL) 20 MG tablet Take 1 tablet by mouth daily 9/3/20  Yes Luiz Pemberton MD   levothyroxine (SYNTHROID) 50 MCG tablet Take 1 tablet by mouth Daily 9/3/20  Yes Luiz Pemberton MD   atorvastatin (LIPITOR) 40 MG tablet Take 1 tablet by mouth nightly 9/3/20  Yes Luiz Pemberton MD   metoprolol (LOPRESSOR) 100 MG tablet Take 1 tablet by mouth 2 times daily 9/3/20  Yes Luiz Pemberton MD   dilTIAZem (CARDIZEM CD) 240 MG extended release capsule Take 1 capsule by mouth daily 9/3/20  Yes Luiz Pemberton MD   potassium chloride (KLOR-CON) 20 MEQ packet Take 20 mEq by mouth daily 9/3/20  Yes Luiz Pemberton MD   epoetin nena-epbx (RETACRIT) 4000 UNIT/ML SOLN injection Inject 2 mLs into the skin three times a week Per Nephrology 2/17/20  Yes HEBER Stoll CNP   LORazepam (ATIVAN) 0.5 MG tablet Take 0.5 mg by mouth 2 times daily as needed for Anxiety.    Yes Historical Provider, MD   influenza virus trivalent vaccine (FLUZONE) injection Inject 0.5 mLs into the muscle once Given 9/2020 with doctor    Historical Provider, MD   insulin NPH (HUMULIN N KWIKPEN) 100 UNIT/ML injection pen 6 units daily at night 9/29/20   Romulo Sargent MD   Ferric Citrate (AURYXIA) 1  MG(Fe) TABS Take 1 tablet by mouth 3 times daily (with meals)  9/3/20   Historical Provider, MD   insulin aspart (NOVOLOG) 100 UNIT/ML injection vial Inject 0-10 Units into the skin 3 times daily (before meals) *Per Sliding Scale* 9/3/20   Antonio Garcia MD   blood glucose test strips (ASCENSIA AUTODISC VI;ONE TOUCH ULTRA TEST VI) strip Indications: 5x a day Freestyle Lite -Tests 5 times daily and as needed for low glucose. E10.10 7/29/20   Antonio Garcia MD   Lancets Thin MISC Indications: cks 5xa a day cks 5xa a day 7/29/20   Antonio Garcia MD   glucose (GLUTOSE) 40 % GEL Take 15 g by mouth as needed 4/1/16   Rosalie Piedra MD   Insulin Syringe-Needle U-100 (INSULIN SYRINGE .5CC/30GX1/2\") 30G X 1/2\" 0.5 ML MISC by Does not apply route. Indications: uses 6-7 a day    Historical Provider, MD       Allergies: Toradol [ketorolac tromethamine]    Social History:    reports that she has been smoking cigarettes. She has a 3.00 pack-year smoking history. She uses smokeless tobacco. She reports that she does not drink alcohol or use drugs. Family History:   family history includes Asthma in her brother and mother; Diabetes in her mother; High Blood Pressure in her father and mother; Hypertension in her mother.      PHYSICAL EXAM:  Vitals:  BP (!) 145/87   Pulse 83   Temp 97.8 °F (36.6 °C) (Oral)   Resp 14   Ht 5' 4\" (1.626 m)   Wt 122 lb (55.3 kg)   SpO2 100%   BMI 20.94 kg/m²   General Appearance: AOx3 and NAD  Skin: Warm and dry  Head: Normocephalic and atraumatic, mucous membranes well hydrated   Eyes: Pupils equal, round, and reactive to light  Neck: Supple and non tender without mass   Pulmonary/Chest: Clear to auscultation bilaterally- no wheezes, no crackle, not in respiratory distress  Cardiovascular: Normal rate, normal S1 and S2 and no carotid bruits  Abdomen: Soft, non-tender, non-distended, no rebound, no rigidity, + bowel sounds  Extremities: No cyanosis, no clubbing and no edema  Neurologic: No neurologic focal deficits, speech is normal, coherent     LABS:  CBC  Recent Labs     10/08/20  1526   WBC 8.6   HGB 10.7*   HCT 33.8*   MCV 93.6        BMP  Recent Labs     10/07/20  1550 10/08/20  1526 10/08/20  1756   NA 136 131*  --    K 3.5 3.7  --    CL 98 93*  --    CO2 24 24  --    BUN 30* 37*  --    CREATININE 7.2* 7.0*  --    LABGLOM 8 8  --    GLUCOSE 264* 570* 358   CALCIUM 8.4* 8.2*  --      Lab Results   Component Value Date    MG 1.6 08/10/2020    PHOS 6.1 (H) 10/07/2020     LFT  Recent Labs     10/07/20  1550 10/08/20  1526   ALT 15 19   AST 21 21   ALKPHOS 112* 134*   BILITOT <0.2 <0.2     Albumin  Lab Results   Component Value Date    LABALBU 2.9 (L) 10/08/2020    LABALBU 2.6 (L) 10/07/2020    LABALBU 2.7 (L) 08/10/2020     Lactic acid   No results for input(s): LACTSEPSIS in the last 72 hours. Cardiac  Troponin   Lab Results   Component Value Date    TROPONINI 0.04 (H) 08/10/2020    TROPONINI 0.06 (H) 07/16/2020    TROPONINI 0.06 (H) 07/14/2020     Pro-BNP   Lab Results   Component Value Date    PROBNP 18,603 (H) 08/10/2020    PROBNP 4,696 (H) 07/16/2020    PROBNP 11,964 (H) 02/11/2020     Iron studies  Lab Results   Component Value Date    FERRITIN 526 07/18/2020    IRON 72 07/18/2020    TIBC 82 (L) 07/18/2020       ASSESSMENT and PLAN:      Problem   Uncontrolled Type 1 Diabetes Mellitus With Hyperglycemia, With Long-Term Current Use of Insulin (Hcc)   Esrd On Peritoneal Dialysis (Hcc)   Hypothyroidism   Hyperlipidemia   Hypertension     Uncontrolled type 1 diabetes with hyperglycemia  · Increase Lantus from 10-15 twice a day  · Medium sliding scale    Resistant hypertension  · Continue Bumex and metolazone  · Diltiazem and Lopressor  · Lisinopril    End-stage renal disease on peritoneal dialysis  · Consult nephrology    # Chronic back pain: Tylenol as needed    # Hyperlipidemia: Atorvastatin  # Hypothyroidism: Synthroid    Code Status: Full   DVT prophylaxis: SQH  Diet: Diabetic with fluid restriction       PLEASE NOTE:    This report was transcribed using typing and voice recognition software.  Every effort was made to ensure accuracy; however, inadvertent computerized transcription errors may be present.  More than 50 minutes have been spent in evaluating the patient, reviewing records and results, discussing with staff / family and other treating physicians.     Laly Song MD, MSc   [facility]

## 2020-10-10 PROBLEM — N30.00 ACUTE CYSTITIS WITHOUT HEMATURIA: Status: ACTIVE | Noted: 2020-10-10

## 2020-10-10 PROBLEM — E43 SEVERE PROTEIN-CALORIE MALNUTRITION (HCC): Status: ACTIVE | Noted: 2020-02-11

## 2020-10-10 LAB
ACINETOBACTER BAUMANNII BY PCR: NOT DETECTED
ALBUMIN SERPL-MCNC: 3.6 G/DL (ref 3.5–5.2)
ALP BLD-CCNC: 88 U/L (ref 35–104)
ALT SERPL-CCNC: 12 U/L (ref 0–32)
ANION GAP SERPL CALCULATED.3IONS-SCNC: 14 MMOL/L (ref 7–16)
AST SERPL-CCNC: 19 U/L (ref 0–31)
BASOPHILS ABSOLUTE: 0.07 E9/L (ref 0–0.2)
BASOPHILS RELATIVE PERCENT: 1 % (ref 0–2)
BILIRUB SERPL-MCNC: <0.2 MG/DL (ref 0–1.2)
BILIRUBIN DIRECT: <0.2 MG/DL (ref 0–0.3)
BILIRUBIN, INDIRECT: ABNORMAL MG/DL (ref 0–1)
BOTTLE TYPE: ABNORMAL
BUN BLDV-MCNC: 31 MG/DL (ref 6–20)
CALCIUM SERPL-MCNC: 8.5 MG/DL (ref 8.6–10.2)
CANDIDA ALBICANS BY PCR: NOT DETECTED
CANDIDA GLABRATA BY PCR: NOT DETECTED
CANDIDA KRUSEI BY PCR: NOT DETECTED
CANDIDA PARAPSILOSIS BY PCR: NOT DETECTED
CANDIDA TROPICALIS BY PCR: NOT DETECTED
CHLORIDE BLD-SCNC: 99 MMOL/L (ref 98–107)
CO2: 24 MMOL/L (ref 22–29)
CREAT SERPL-MCNC: 6.2 MG/DL (ref 0.5–1)
ENTEROBACTER CLOACAE COMPLEX BY PCR: NOT DETECTED
ENTEROBACTERALES BY PCR: NOT DETECTED
ENTEROCOCCUS BY PCR: NOT DETECTED
EOSINOPHILS ABSOLUTE: 0.56 E9/L (ref 0.05–0.5)
EOSINOPHILS RELATIVE PERCENT: 8.3 % (ref 0–6)
ESCHERICHIA COLI BY PCR: NOT DETECTED
GFR AFRICAN AMERICAN: 10
GFR NON-AFRICAN AMERICAN: 10 ML/MIN/1.73
GLUCOSE BLD-MCNC: 91 MG/DL (ref 74–99)
HAEMOPHILUS INFLUENZAE BY PCR: NOT DETECTED
HCT VFR BLD CALC: 30.1 % (ref 34–48)
HEMOGLOBIN: 9.5 G/DL (ref 11.5–15.5)
IMMATURE GRANULOCYTES #: 0.02 E9/L
IMMATURE GRANULOCYTES %: 0.3 % (ref 0–5)
KLEBSIELLA OXYTOCA BY PCR: NOT DETECTED
KLEBSIELLA PNEUMONIAE GROUP BY PCR: NOT DETECTED
LISTERIA MONOCYTOGENES BY PCR: NOT DETECTED
LYMPHOCYTES ABSOLUTE: 2.36 E9/L (ref 1.5–4)
LYMPHOCYTES RELATIVE PERCENT: 35 % (ref 20–42)
MAGNESIUM: 2.2 MG/DL (ref 1.6–2.6)
MCH RBC QN AUTO: 29.5 PG (ref 26–35)
MCHC RBC AUTO-ENTMCNC: 31.6 % (ref 32–34.5)
MCV RBC AUTO: 93.5 FL (ref 80–99.9)
METER GLUCOSE: 100 MG/DL (ref 74–99)
METER GLUCOSE: 232 MG/DL (ref 74–99)
METER GLUCOSE: 292 MG/DL (ref 74–99)
METER GLUCOSE: 47 MG/DL (ref 74–99)
METER GLUCOSE: 64 MG/DL (ref 74–99)
METER GLUCOSE: 78 MG/DL (ref 74–99)
METER GLUCOSE: 89 MG/DL (ref 74–99)
METER GLUCOSE: 90 MG/DL (ref 74–99)
METHICILLIN RESISTANCE MECA/C  BY PCR: DETECTED
MONOCYTES ABSOLUTE: 0.54 E9/L (ref 0.1–0.95)
MONOCYTES RELATIVE PERCENT: 8 % (ref 2–12)
NEISSERIA MENINGITIDIS BY PCR: NOT DETECTED
NEUTROPHILS ABSOLUTE: 3.2 E9/L (ref 1.8–7.3)
NEUTROPHILS RELATIVE PERCENT: 47.4 % (ref 43–80)
ORDER NUMBER: ABNORMAL
PDW BLD-RTO: 16.9 FL (ref 11.5–15)
PLATELET # BLD: 220 E9/L (ref 130–450)
PMV BLD AUTO: 9.4 FL (ref 7–12)
POTASSIUM REFLEX MAGNESIUM: 3.3 MMOL/L (ref 3.5–5)
PROTEUS BY PCR: NOT DETECTED
PSEUDOMONAS AERUGINOSA BY PCR: NOT DETECTED
RBC # BLD: 3.22 E12/L (ref 3.5–5.5)
SERRATIA MARCESCENS BY PCR: NOT DETECTED
SODIUM BLD-SCNC: 137 MMOL/L (ref 132–146)
SOURCE OF BLOOD CULTURE: ABNORMAL
STAPHYLOCOCCUS AUREUS BY PCR: NOT DETECTED
STAPHYLOCOCCUS SPECIES BY PCR: DETECTED
STREPTOCOCCUS AGALACTIAE BY PCR: NOT DETECTED
STREPTOCOCCUS PNEUMONIAE BY PCR: NOT DETECTED
STREPTOCOCCUS PYOGENES  BY PCR: NOT DETECTED
STREPTOCOCCUS SPECIES BY PCR: NOT DETECTED
TOTAL PROTEIN: 5.8 G/DL (ref 6.4–8.3)
WBC # BLD: 6.8 E9/L (ref 4.5–11.5)

## 2020-10-10 PROCEDURE — 6370000000 HC RX 637 (ALT 250 FOR IP): Performed by: INTERNAL MEDICINE

## 2020-10-10 PROCEDURE — P9047 ALBUMIN (HUMAN), 25%, 50ML: HCPCS | Performed by: NURSE PRACTITIONER

## 2020-10-10 PROCEDURE — 6360000002 HC RX W HCPCS: Performed by: INTERNAL MEDICINE

## 2020-10-10 PROCEDURE — 2580000003 HC RX 258: Performed by: INTERNAL MEDICINE

## 2020-10-10 PROCEDURE — 2060000000 HC ICU INTERMEDIATE R&B

## 2020-10-10 PROCEDURE — 6360000002 HC RX W HCPCS: Performed by: NURSE PRACTITIONER

## 2020-10-10 PROCEDURE — 82962 GLUCOSE BLOOD TEST: CPT

## 2020-10-10 PROCEDURE — 90945 DIALYSIS ONE EVALUATION: CPT

## 2020-10-10 PROCEDURE — 85025 COMPLETE CBC W/AUTO DIFF WBC: CPT

## 2020-10-10 PROCEDURE — 83735 ASSAY OF MAGNESIUM: CPT

## 2020-10-10 PROCEDURE — 80076 HEPATIC FUNCTION PANEL: CPT

## 2020-10-10 PROCEDURE — 36415 COLL VENOUS BLD VENIPUNCTURE: CPT

## 2020-10-10 PROCEDURE — 80048 BASIC METABOLIC PNL TOTAL CA: CPT

## 2020-10-10 PROCEDURE — 99233 SBSQ HOSP IP/OBS HIGH 50: CPT | Performed by: INTERNAL MEDICINE

## 2020-10-10 RX ADMIN — INSULIN GLARGINE 10 UNITS: 100 INJECTION, SOLUTION SUBCUTANEOUS at 10:20

## 2020-10-10 RX ADMIN — GABAPENTIN 100 MG: 100 CAPSULE ORAL at 14:12

## 2020-10-10 RX ADMIN — DILTIAZEM HYDROCHLORIDE 240 MG: 240 CAPSULE, COATED, EXTENDED RELEASE ORAL at 09:18

## 2020-10-10 RX ADMIN — LEVOTHYROXINE SODIUM 50 MCG: 50 TABLET ORAL at 05:40

## 2020-10-10 RX ADMIN — Medication 10 ML: at 21:59

## 2020-10-10 RX ADMIN — INSULIN LISPRO 3 UNITS: 100 INJECTION, SOLUTION INTRAVENOUS; SUBCUTANEOUS at 11:34

## 2020-10-10 RX ADMIN — BUMETANIDE 2 MG: 1 TABLET ORAL at 21:58

## 2020-10-10 RX ADMIN — GABAPENTIN 100 MG: 100 CAPSULE ORAL at 09:18

## 2020-10-10 RX ADMIN — INSULIN LISPRO 4 UNITS: 100 INJECTION, SOLUTION INTRAVENOUS; SUBCUTANEOUS at 11:33

## 2020-10-10 RX ADMIN — VANCOMYCIN HYDROCHLORIDE 1000 MG: 1 INJECTION, POWDER, LYOPHILIZED, FOR SOLUTION INTRAVENOUS at 18:22

## 2020-10-10 RX ADMIN — HEPARIN SODIUM 5000 UNITS: 5000 INJECTION INTRAVENOUS; SUBCUTANEOUS at 05:41

## 2020-10-10 RX ADMIN — BUMETANIDE 2 MG: 1 TABLET ORAL at 09:18

## 2020-10-10 RX ADMIN — Medication 10 ML: at 09:18

## 2020-10-10 RX ADMIN — HYDROCODONE BITARTRATE AND ACETAMINOPHEN 1 TABLET: 5; 325 TABLET ORAL at 21:58

## 2020-10-10 RX ADMIN — Medication 15 G: at 00:27

## 2020-10-10 RX ADMIN — METOPROLOL TARTRATE 100 MG: 50 TABLET, FILM COATED ORAL at 09:18

## 2020-10-10 RX ADMIN — GABAPENTIN 100 MG: 100 CAPSULE ORAL at 21:57

## 2020-10-10 RX ADMIN — ALBUMIN (HUMAN) 25 G: 0.25 INJECTION, SOLUTION INTRAVENOUS at 03:36

## 2020-10-10 RX ADMIN — SODIUM CHLORIDE, PRESERVATIVE FREE 10 ML: 5 INJECTION INTRAVENOUS at 18:25

## 2020-10-10 RX ADMIN — METOPROLOL TARTRATE 100 MG: 50 TABLET, FILM COATED ORAL at 21:57

## 2020-10-10 RX ADMIN — HYDROCODONE BITARTRATE AND ACETAMINOPHEN 2 TABLET: 5; 325 TABLET ORAL at 14:12

## 2020-10-10 RX ADMIN — HEPARIN SODIUM 5000 UNITS: 5000 INJECTION INTRAVENOUS; SUBCUTANEOUS at 14:12

## 2020-10-10 RX ADMIN — METOLAZONE 5 MG: 2.5 TABLET ORAL at 09:18

## 2020-10-10 RX ADMIN — ATORVASTATIN CALCIUM 40 MG: 40 TABLET, FILM COATED ORAL at 21:57

## 2020-10-10 RX ADMIN — LISINOPRIL 20 MG: 20 TABLET ORAL at 09:18

## 2020-10-10 RX ADMIN — INSULIN HUMAN 5 UNITS: 100 INJECTION, SUSPENSION SUBCUTANEOUS at 22:02

## 2020-10-10 RX ADMIN — ONDANSETRON 4 MG: 2 INJECTION INTRAMUSCULAR; INTRAVENOUS at 18:25

## 2020-10-10 RX ADMIN — HEPARIN SODIUM 5000 UNITS: 5000 INJECTION INTRAVENOUS; SUBCUTANEOUS at 21:58

## 2020-10-10 RX ADMIN — HYDROCODONE BITARTRATE AND ACETAMINOPHEN 2 TABLET: 5; 325 TABLET ORAL at 09:18

## 2020-10-10 RX ADMIN — LORAZEPAM 0.5 MG: 0.5 TABLET ORAL at 18:22

## 2020-10-10 RX ADMIN — Medication 15 G: at 06:24

## 2020-10-10 ASSESSMENT — PAIN DESCRIPTION - DESCRIPTORS
DESCRIPTORS: DISCOMFORT;ACHING
DESCRIPTORS: CRAMPING;ACHING
DESCRIPTORS: ACHING;CRAMPING
DESCRIPTORS: DISCOMFORT

## 2020-10-10 ASSESSMENT — PAIN SCALES - WONG BAKER: WONGBAKER_NUMERICALRESPONSE: 0

## 2020-10-10 ASSESSMENT — PAIN DESCRIPTION - LOCATION
LOCATION: ABDOMEN
LOCATION: BACK;GENERALIZED
LOCATION: BACK;ABDOMEN
LOCATION: GENERALIZED;BACK

## 2020-10-10 ASSESSMENT — PAIN SCALES - GENERAL
PAINLEVEL_OUTOF10: 8
PAINLEVEL_OUTOF10: 3
PAINLEVEL_OUTOF10: 4
PAINLEVEL_OUTOF10: 4
PAINLEVEL_OUTOF10: 0
PAINLEVEL_OUTOF10: 8
PAINLEVEL_OUTOF10: 2
PAINLEVEL_OUTOF10: 2
PAINLEVEL_OUTOF10: 5
PAINLEVEL_OUTOF10: 2

## 2020-10-10 ASSESSMENT — PAIN DESCRIPTION - ORIENTATION
ORIENTATION: LOWER
ORIENTATION: LOWER

## 2020-10-10 ASSESSMENT — PAIN DESCRIPTION - FREQUENCY
FREQUENCY: CONTINUOUS
FREQUENCY: INTERMITTENT
FREQUENCY: CONTINUOUS
FREQUENCY: INTERMITTENT

## 2020-10-10 ASSESSMENT — PAIN - FUNCTIONAL ASSESSMENT
PAIN_FUNCTIONAL_ASSESSMENT: ACTIVITIES ARE NOT PREVENTED
PAIN_FUNCTIONAL_ASSESSMENT: PREVENTS OR INTERFERES SOME ACTIVE ACTIVITIES AND ADLS
PAIN_FUNCTIONAL_ASSESSMENT: PREVENTS OR INTERFERES SOME ACTIVE ACTIVITIES AND ADLS

## 2020-10-10 ASSESSMENT — PAIN DESCRIPTION - ONSET
ONSET: ON-GOING
ONSET: ON-GOING

## 2020-10-10 ASSESSMENT — PAIN DESCRIPTION - PROGRESSION
CLINICAL_PROGRESSION: NOT CHANGED
CLINICAL_PROGRESSION: NOT CHANGED

## 2020-10-10 ASSESSMENT — PAIN DESCRIPTION - PAIN TYPE
TYPE: CHRONIC PAIN

## 2020-10-10 NOTE — PROGRESS NOTES
Wilton's nightly sugar was 134. She did not want the sliding scale humalog but she did want her humulin N. She was also given a snack with the humulin around 21:45. Sugar rechecked after midnight and was 47. Pt alert and lethargic which has been her normal since she was admitted. Pt given 2 tubes of oral glucose. Sugar recheck was 90.

## 2020-10-10 NOTE — PROGRESS NOTES
Morning BGL was 64. 1 tube oral glucose given + orange juice. Glucose re-check read 89. Ms. Gant is a 64 y/o Lao-speaking female w/ a PMHx of HTN, HLD, T2DM, and COVID-19 infection in 4/2020 who was admitted for management of newly diagnosed APL. The patient has been receiving treatment with ATRA and Arsenic (ATRA since 5/22 and Arsenic 5/27). Arsenic has been on hold since 6/22 secondary to Herpes Zoster infection. Patient's hospital course has been complicated by transaminitis, refractory thrombocytopenia, COVID-19 (+) state (6/16), shingles, and right axillary DVT. The patient has pancytopenia 2/2 disease and/or ATRA/Arsenic.

## 2020-10-10 NOTE — PROGRESS NOTES
Pharmacy Consultation Note  (Antibiotic Dosing and Monitoring)    Initial consult date: 10/10/20  Consulting physician: Dr. Wood Man  Drug(s): Vancomycin  Indication: Positive blood culture    Ht Readings from Last 1 Encounters:   10/10/20 5' 4\" (1.626 m)     Wt Readings from Last 1 Encounters:   10/10/20 128 lb 1.6 oz (58.1 kg)         Age/  Gender IBW DW  Allergy Information   31 y.o.     female 54.7 kg 58.1 kg  Toradol [ketorolac tromethamine]                 Date  WBC CCPD Drug/Dose Time   Given Level(s)   (Time) Comments   10/10  (#1) 6.8 QHS  Vancomycin 1,000 mg IV x 1 <1730>       (#2)           (#3)           (#4)           (#5)           (#6)           (#7)           Estimated Creatinine Clearance: 12 mL/min (A) (based on SCr of 6.2 mg/dL (H)). UOP over the past 24 hours:       Intake/Output Summary (Last 24 hours) at 10/10/2020 1703  Last data filed at 10/10/2020 1150  Gross per 24 hour   Intake 300 ml   Output --   Net 300 ml       Temp max: Temp (24hrs), Av.3 °F (36.8 °C), Min:98 °F (36.7 °C), Max:98.7 °F (37.1 °C)      Antibiotic Regimen: No other antibiotics at this time  Antibiotic Dose Date Initiated            Cultures:  available culture and sensitivity results were reviewed in EPIC  Cultures sent and are pending. Culture Date Result    Urine 10/8 E faecalis   Blood #1 10/ GPC in clusters   Blood #2 10/ GPC in clusters   Blood ID, molecular 10/8 Staph (not aureus) by PCR  Methicillin resistant by PCR     Assessment:  · Consulted by Dr. Wood Man to dose/monitor vancomycin  · Goal trough level:  15-20 mcg/mL  · Pt is a 31 yof admitted to the hospital for hyperglycemia. She is being initiated on vancomycin for a positive blood culture, final c/s pending.   · Wilton is ESRD on PD, cycles nightly, nephro following    Plan:  · Vancomycin 1,000 mg IV x 1  · Levels PRN  · Follow nephro notes/PD schedule  · Pharmacist will follow and monitor/adjust dosing as necessary      Thank you for the consult,    Clyde Pacheco, PharmD, BCPS 10/10/2020 5:16 PM   [phone]

## 2020-10-10 NOTE — PROGRESS NOTES
Comprehensive Nutrition Assessment    Type and Reason for Visit:  Initial, Positive Nutrition Screen    Nutrition Recommendations/Plan: Continue Current Diet, Start Oral Nutrition Supplement  Noted ESRD in addition to T1DM. Noted 1200ml FR therefore would recommend Magic cup Daily to aide in nutritional status while monitoring BSL/Renal Labs. Nutrition Assessment:  Pt admit 2/2 T1DM. Noted ESRD on PD. PO diet advanced w/ adequate intake. Malnutrition Assessment:  Malnutrition Status:  Severe malnutrition    Context:  Chronic Illness     Findings of the 6 clinical characteristics of malnutrition:  Energy Intake:  7 - 75% or less estimated energy requirements for 1 month or longer  Weight Loss:  7 - Greater than 20% over 1 year     Body Fat Loss:  No significant body fat loss     Muscle Mass Loss:  No significant muscle mass loss    Fluid Accumulation:  No significant fluid accumulation     Strength:  Not Performed    Estimated Daily Nutrient Needs:  Energy (kcal):  MSJ 1268 x1.3=1648; ; Weight Used for Energy Requirements:  Current     Protein (g):  (1.5-1.8gm/kg CBW); Weight Used for Protein Requirements:  Current          Nutrition Related Findings:  active BS, soft abd, diarrhea, PD, no noted edema, fluids WNL, intermittent hypoglycemia      Wounds:  None       Current Nutrition Therapies:    DIET GENERAL; Carb Control: 4 carb choices (60 gms)/meal; Daily Fluid Restriction: 1200 ml    Anthropometric Measures:  · Height: 5' 4\" (162.6 cm)  · Current Body Weight: 122 lb (55.3 kg)(10/8 actual; 10/10 128# actual)   · Admission Body Weight:      · Usual Body Weight: 187 lb (84.8 kg)(10/2019 per EMR, actual)     · Ideal Body Weight: 120 lbs; % Ideal Body Weight 101.7 %   · BMI: 20.9  · BMI Categories: Normal Weight (BMI 18.5-24. 9)       Nutrition Diagnosis:   · Severe malnutrition, In context of chronic illness related to catabolic illness as evidenced by weight loss greater than or equal to 20% in 1 year, poor intake prior to admission    Nutrition Interventions:   Nutrition Education/Counseling:  Education not indicated   Coordination of Nutrition Care:  Continued Inpatient Monitoring, Coordination of Community Care    Goals:  Pt to consume >75% of meals and ONS       Nutrition Monitoring and Evaluation:   Food/Nutrient Intake Outcomes:  Food and Nutrient Intake, Supplement Intake  Physical Signs/Symptoms Outcomes:  Nutrition Focused Physical Findings, Biochemical Data, Skin, Weight, GI Status, Diarrhea, Fluid Status or Edema, Hemodynamic Status, Nausea or Vomiting     Electronically signed by Vamshi Waldron, MS, RD, LD on 10/10/20 at 9:38 AM EDT

## 2020-10-10 NOTE — PROGRESS NOTES
[address] [place], [place] [address]ist   Progress Note    Admitting Date and Time: 10/8/2020  3:15 PM  Admit Dx: Type I diabetes mellitus with hyperosmolar coma ([facility] [address] [E10.69, E10.65]    Subjective:    10/9: Pt feels sore in right lower back. She does not have much of appetite. She tells me saw endocrinologist end of September and he changed her from bid Lantus to Lantus in AM and NPH at night(as she does peritoneal dialysis at night). She feels like she has UTI.     10/10: Patient still having stomach cramping after eating and diarrhea but that has been ongoing issue for here. Per RN: blood sugars have been running low today. In additiona, blood cultures came x 2 sets came back positive.          vancomycin  1,000 mg Intravenous Once    vancomycin (VANCOCIN) intermittent dosing (placeholder)   Other RX Placeholder    [START ON 10/11/2020] insulin lispro  0-6 Units Subcutaneous TID WC    [START ON 10/11/2020] insulin lispro  0-3 Units Subcutaneous Nightly    epoetin nena-epbx  8,000 Units Subcutaneous Once per day on Mon Wed Fri    insulin glargine  10 Units Subcutaneous QAM    insulin NPH  5 Units Subcutaneous Nightly    insulin lispro  3 Units Subcutaneous TID     sodium chloride flush  10 mL Intravenous 2 times per day    atorvastatin  40 mg Oral Nightly    bumetanide  2 mg Oral BID    dilTIAZem  240 mg Oral Daily    gabapentin  100 mg Oral TID    levothyroxine  50 mcg Oral Daily    lisinopril  20 mg Oral Daily    metOLazone  5 mg Oral Daily    metoprolol  100 mg Oral BID    sodium chloride flush  10 mL Intravenous 2 times per day    heparin (porcine)  5,000 Units Subcutaneous 3 times per day     HYDROcodone 5 mg - acetaminophen, 1 tablet, Q4H PRN    Or  HYDROcodone 5 mg - acetaminophen, 2 tablet, Q4H PRN  sodium chloride flush, 10 mL, PRN  acetaminophen, 650 mg, Q4H PRN  cloNIDine, 0.1 mg, BID PRN  LORazepam, 0.5 mg, BID PRN  sodium chloride flush, 10 mL, PRN  acetaminophen, 650 mg, Q6H Blood ID, Molecular [[id number]] (Abnormal)  Collected: 10/08/20 1820        Updated: 10/10/20 1228        Bottle Type  Aerobic        Source of Blood Culture  No site given        Order Number  [id number]        Enterobacter cloacae complex by PCR  Not Detected        Escherichia coli by PCR  Not Detected        Klebsiella oxytoca by PCR  Not Detected        Klebsiella pneumoniae by PCR  Not Detected        Proteus by PCR  Not Detected        Streptococcus agalactiae by PCR  Not Detected        Staphylococcus aureus by PCR  Not Detected        Serratia marcescens by PCR  Not Detected        Streptococcus pneumoniae by PCR  Not Detected        Streptococcus pyogenes  by PCR  Not Detected        Acinetobacter baumannii by PCR  Not Detected        Candida albicans by PCR  Not Detected        Candida glabrata by PCR  Not Detected        Candida krusei by PCR  Not Detected        Candida parapsilosis by PCR  Not Detected        Candida tropicalis by PCR  Not Detected        Enterobacteriaceae by PCR  Not Detected        Enterococcus by PCR  Not Detected        Haemophilus Influenzae by PCR  Not Detected        Listeria monocytogenes by PCR  Not Detected        Neisseria meningitidis by PCR  Not Detected        Pseudomonas aeruginosa by PCR  Not Detected        Staphylococcus by PCR  DETECTED        Streptococcus by PCR  Not Detected        Methicillin Resistant by PCR  DETECTED       Narrative:         CALL  [facility] tel. ,   Microbiology results called to and read back by [facility] rn, 10/10/2020   12:28, by Anastasia Perkins             Culture, Blood 2 [[id number]] (Abnormal)  Collected: 10/08/20 1835       Specimen: Blood  Updated: 10/10/20 1035        Culture, Blood 2  --        Gram stain performed from blood culture bottle media   Gram positive cocci in clusters          Narrative:         Source: BLOOD       Site:                 Culture, Blood 1 [[id number]] (Abnormal)  Collected: 10/08/20 1820       Specimen: Blood Updated: 10/10/20 1033        Blood Culture, Routine  --        Gram stain performed from blood culture bottle media   Gram positive cocci in clusters          Narrative:         Source: BLOOD       Site:        Culture, Urine [[id number]] (Abnormal)   Collected: 10/08/20 1526       Specimen: Urine, clean catch  Updated: 10/10/20 0712        Organism  Enterococcus faecalis        Urine Culture, Routine  --        50,000 CFU/ml   Identification and sensitivity to follow       Narrative:         Source: URINE       Site:        Radiology:   XR CHEST PORTABLE   Final Result   No evidence of pneumonia or pleural effusion. Assessment:  Principal Problem:    Uncontrolled type 1 diabetes mellitus with hyperglycemia, with long-term current use of insulin (HCC)  Active Problems:    ESRD on peritoneal dialysis ([facility] [address]    Severe protein-calorie malnutrition ([facility] [address]    Acute cystitis without hematuria    Hypertension    Hypothyroidism    Hyperlipidemia  Resolved Problems:    * No resolved hospital problems. *      Plan:    1. Uncontrolled type I DM with hyperglycemia as well as hypoglycemia--she did received Lantus in ER as well as higher dose of Lantus this morning at 15 units. I placed back on regimen of Lantus 10 units in AM, NPH 5 units at night and Novolog 3 units with meals plus SSI. Apparently, she is on low dose SSI at home and here we ordered medium dose. Will hold SSI for today and start low dose tomorrow. See excerpt from Dr. Marilu Renee note:   · Patient's diabetes is uncontrol complicated with both macro and microvascular complications   · Will change DM regimen to Lantus 10 units daily in am, Novolog 3 units with meals + sliding scale 1:50>150, NPH 5 units at night to cover peritoneal dialysis   · The patient was advised to continue checking blood sugars 4 times a day before meals and at bedtime and send BS readings to our office in a week.   · Will order insulin pump and CGM to help with better DM control · Patient will need routine diabetes maintenance and prevention  · The patient was referred to diabetic educator for further teaching on carb counting   2. UTI--patient given ceftriaxone 1g in ER. Urine Cx showing GPC and I suspected  Enterococcus (this was confirmed on Cx today). Yesterday, decided to treat with one dose of Fosfomycin. 3. Gram positive cocci x 2 sets--will dose with Vancomycin to be adjusted for PD. Pharm D consulted. ID consulted as well. 4. Low back pain-heating pad and Norco prn.  5. ESRD on PD--gets nightly PD treatment. Nephrology consulted and following. Case discussed with nurse at bedside. Case discussed with Pharm D via phone. NOTE: This report was transcribed using voice recognition software. Every effort was made to ensure accuracy; however, inadvertent computerized transcription errors may be present.      Electronically signed by Radha Gallegos MD on 10/10/2020 at 5:10 PM              \

## 2020-10-10 NOTE — PLAN OF CARE
Problem: Pain:  Description: Pain management should include both nonpharmacologic and pharmacologic interventions.   Goal: Pain level will decrease  Outcome: Met This Shift     Problem: Falls - Risk of:  Goal: Will remain free from falls  Outcome: Met This Shift     Problem: Falls - Risk of:  Goal: Absence of physical injury  Outcome: Met This Shift     Problem: Serum Glucose Level - Abnormal:  Goal: Ability to maintain appropriate glucose levels will improve  Outcome: Met This Shift     Problem: Sensory Perception - Impaired:  Goal: Ability to maintain a stable neurologic state will improve  Outcome: Met This Shift

## 2020-10-10 NOTE — PROGRESS NOTES
Department of Internal Medicine  Nephrology Progress Note      Reason for Consult:  PD  Requesting Physician:  Dr. Michelle Sethi:  Hyperglycemia    History Obtained From:  patient, electronic medical record    HISTORY OF PRESENT ILLNESS:      Briefly, Moo Ervin is a 31year-old female with history of ESRD on PD, with severe proteinuria, poorly controlled type I DM with multiple admissions for DKA, gastroparesis, HTN who presented to the ER on 10/8/2020 with complaints of hyperglycemia, vomiting and back pain. She reported that these symptoms have been present for approximately 1 month and that they had been getting worse over the last two days. She was admitted for further evaluation and treatment.  We are consulted for management of ESRD and PD.    10/10/20:  Doing better today  Tolerated CCPD well    Current Medications:    Current Facility-Administered Medications: epoetin nena-epbx (RETACRIT) injection 8,000 Units, 8,000 Units, Subcutaneous, Once per day on Mon Wed Fri  HYDROcodone-acetaminophen (NORCO) 5-325 MG per tablet 1 tablet, 1 tablet, Oral, Q4H PRN **OR** HYDROcodone-acetaminophen (NORCO) 5-325 MG per tablet 2 tablet, 2 tablet, Oral, Q4H PRN  insulin glargine (LANTUS) injection vial 10 Units, 10 Units, Subcutaneous, QAM  insulin NPH (HUMULIN N;NOVOLIN N) injection vial 5 Units, 5 Units, Subcutaneous, Nightly  insulin lispro (HUMALOG) injection vial 3 Units, 3 Units, Subcutaneous, TID WC  sodium chloride flush 0.9 % injection 10 mL, 10 mL, Intravenous, 2 times per day  sodium chloride flush 0.9 % injection 10 mL, 10 mL, Intravenous, PRN  acetaminophen (TYLENOL) tablet 650 mg, 650 mg, Oral, Q4H PRN  atorvastatin (LIPITOR) tablet 40 mg, 40 mg, Oral, Nightly  bumetanide (BUMEX) tablet 2 mg, 2 mg, Oral, BID  cloNIDine (CATAPRES) tablet 0.1 mg, 0.1 mg, Oral, BID PRN  dilTIAZem (CARDIZEM CD) extended release capsule 240 mg, 240 mg, Oral, Daily  gabapentin (NEURONTIN) capsule 100 mg, 100 mg, Oral, TID  levothyroxine (SYNTHROID) tablet 50 mcg, 50 mcg, Oral, Daily  lisinopril (PRINIVIL;ZESTRIL) tablet 20 mg, 20 mg, Oral, Daily  LORazepam (ATIVAN) tablet 0.5 mg, 0.5 mg, Oral, BID PRN  metOLazone (ZAROXOLYN) tablet 5 mg, 5 mg, Oral, Daily  metoprolol tartrate (LOPRESSOR) tablet 100 mg, 100 mg, Oral, BID  sodium chloride flush 0.9 % injection 10 mL, 10 mL, Intravenous, 2 times per day  sodium chloride flush 0.9 % injection 10 mL, 10 mL, Intravenous, PRN  acetaminophen (TYLENOL) tablet 650 mg, 650 mg, Oral, Q6H PRN **OR** acetaminophen (TYLENOL) suppository 650 mg, 650 mg, Rectal, Q6H PRN  magnesium hydroxide (MILK OF MAGNESIA) 400 MG/5ML suspension 30 mL, 30 mL, Oral, Daily PRN  promethazine (PHENERGAN) tablet 12.5 mg, 12.5 mg, Oral, Q6H PRN **OR** ondansetron (ZOFRAN) injection 4 mg, 4 mg, Intravenous, Q6H PRN  heparin (porcine) injection 5,000 Units, 5,000 Units, Subcutaneous, 3 times per day  glucose (GLUTOSE) 40 % oral gel 15 g, 15 g, Oral, PRN  dextrose 50 % IV solution, 12.5 g, Intravenous, PRN  glucagon (rDNA) injection 1 mg, 1 mg, Intramuscular, PRN  dextrose 5 % solution, 100 mL/hr, Intravenous, PRN  insulin lispro (HUMALOG) injection vial 0-12 Units, 0-12 Units, Subcutaneous, TID WC  insulin lispro (HUMALOG) injection vial 0-6 Units, 0-6 Units, Subcutaneous, Nightly  Allergies:   Toradol [ketorolac tromethamine]    PHYSICAL EXAM:      Vitals:    VITALS:  BP (!) 162/88   Pulse 88   Temp 98 °F (36.7 °C) (Oral)   Resp 15   Ht 5' 4\" (1.626 m)   Wt 128 lb 1.6 oz (58.1 kg)   SpO2 100%   BMI 21.99 kg/m²   24HR INTAKE/OUTPUT:      Intake/Output Summary (Last 24 hours) at 10/10/2020 1131  Last data filed at 10/9/2020 1450  Gross per 24 hour   Intake 240 ml   Output --   Net 240 ml       Constitutional:  Alert and oriented, chronically ill  HEENT:  Normocephalic, PERRL  Respiratory:  CTA bilaterally, on room air  Cardiovascular/Edema:  RRR,S1/S2  Gastrointestinal:  Soft, nondistended, nontender, bowel sounds active, PD catheter intact and clean  Neurologic:  Nonfocal, AVELAR  Skin:  Warm, dry, no lesions  Other:  No edema, L chest mediport    DATA:    CBC with Differential:    Lab Results   Component Value Date    WBC 6.8 10/10/2020    RBC 3.22 10/10/2020    HGB 9.5 10/10/2020    HCT 30.1 10/10/2020     10/10/2020    MCV 93.5 10/10/2020    MCH 29.5 10/10/2020    MCHC 31.6 10/10/2020    RDW 16.9 10/10/2020    NRBC 0.9 08/10/2020    SEGSPCT 67 06/27/2013    METASPCT 1.7 08/10/2020    LYMPHOPCT 35.0 10/10/2020    MONOPCT 8.0 10/10/2020    BASOPCT 1.0 10/10/2020    MONOSABS 0.54 10/10/2020    LYMPHSABS 2.36 10/10/2020    EOSABS 0.56 10/10/2020    BASOSABS 0.07 10/10/2020     CMP:    Lab Results   Component Value Date     10/10/2020    K 3.3 10/10/2020    CL 99 10/10/2020    CO2 24 10/10/2020    BUN 31 10/10/2020    CREATININE 6.2 10/10/2020    GFRAA 10 10/10/2020    LABGLOM 10 10/10/2020    GLUCOSE 91 10/10/2020    GLUCOSE 130 05/18/2012    PROT 5.8 10/10/2020    LABALBU 3.6 10/10/2020    LABALBU 4.1 05/18/2012    CALCIUM 8.5 10/10/2020    BILITOT <0.2 10/10/2020    ALKPHOS 88 10/10/2020    AST 19 10/10/2020    ALT 12 10/10/2020     Magnesium:    Lab Results   Component Value Date    MG 2.2 10/10/2020     Phosphorus:    Lab Results   Component Value Date    PHOS 6.1 10/07/2020     Radiology Review:    CXR 10/8/2020    No evidence of pneumonia or pleural effusion. IMPRESSION/RECOMMENDATIONS:      Briefly, Ronal Thornton is a 31year-old female with history of ESRD on PD, with severe proteinuria, poorly controlled type I DM with multiple admissions for DKA, gastroparesis, HTN who presented to the ER on 10/8/2020 with complaints of hyperglycemia, vomiting and back pain. She reported that these symptoms have been present for approximately 1 month and that they had been getting worse over the last two days. She was admitted for further evaluation and treatment.  We are consulted for management of ESRD and PD.      1. ESRD on PD, with severe proteinuria, nephrotic range, due to diabetic nephropathy. To continue with home PD Rx.    2. Hypokalemia 2/2 clearance with PD and bumex/metolazone. For careful supplementation. 3. Type I DM, poorly controlled, glucose on admission 264; glucose levels labile. On SSI and lantus. 4. Hx Intractable nausea and vomiting 2/2 diabetic gastroparesis, denies complaints of both today. 5. Hx Chronic diarrhea, diabetic enteropathy, small intestinal bacterial overgrowth?, celiac sprue?, microscopic colitis? 6. Asymptomatic bacteriuria, culture with GPC  2. HTN, on metoprolol, Bumex, metolazone and diltiazem  3. HLD, on atorvastatin  4. Hypoalbuminemia 2/2 poor nutrition. For supplementation  5. Anemia of CKD, to resume retacrit  8,000 units tiw     Plan:     · Albumin 25 g IV Q8 hours x 3 doses  · Potassium 20 mEq PO x 1  · Continue CCPD 2 liters exchanges of 1.5% x 4 (every 2 hours)  · Resume MARIA TERESA      Thank you Dr. Isabella Raygoza for this consultation.     Electronically signed by Micha Arguello MD on 10/10/2020 at 11:31 AM

## 2020-10-11 LAB
ALBUMIN SERPL-MCNC: 3.4 G/DL (ref 3.5–5.2)
ALP BLD-CCNC: 91 U/L (ref 35–104)
ALT SERPL-CCNC: 13 U/L (ref 0–32)
ANION GAP SERPL CALCULATED.3IONS-SCNC: 11 MMOL/L (ref 7–16)
AST SERPL-CCNC: 16 U/L (ref 0–31)
BASOPHILS ABSOLUTE: 0.09 E9/L (ref 0–0.2)
BASOPHILS RELATIVE PERCENT: 1.2 % (ref 0–2)
BILIRUB SERPL-MCNC: <0.2 MG/DL (ref 0–1.2)
BILIRUBIN DIRECT: <0.2 MG/DL (ref 0–0.3)
BILIRUBIN, INDIRECT: ABNORMAL MG/DL (ref 0–1)
BUN BLDV-MCNC: 38 MG/DL (ref 6–20)
CALCIUM SERPL-MCNC: 8.6 MG/DL (ref 8.6–10.2)
CHLORIDE BLD-SCNC: 104 MMOL/L (ref 98–107)
CO2: 25 MMOL/L (ref 22–29)
CREAT SERPL-MCNC: 6.8 MG/DL (ref 0.5–1)
EOSINOPHILS ABSOLUTE: 0.58 E9/L (ref 0.05–0.5)
EOSINOPHILS RELATIVE PERCENT: 7.5 % (ref 0–6)
GFR AFRICAN AMERICAN: 9
GFR NON-AFRICAN AMERICAN: 9 ML/MIN/1.73
GLUCOSE BLD-MCNC: 23 MG/DL (ref 74–99)
HCT VFR BLD CALC: 33.3 % (ref 34–48)
HEMOGLOBIN: 10.4 G/DL (ref 11.5–15.5)
IMMATURE GRANULOCYTES #: 0.04 E9/L
IMMATURE GRANULOCYTES %: 0.5 % (ref 0–5)
LYMPHOCYTES ABSOLUTE: 3.28 E9/L (ref 1.5–4)
LYMPHOCYTES RELATIVE PERCENT: 42.6 % (ref 20–42)
MCH RBC QN AUTO: 30.1 PG (ref 26–35)
MCHC RBC AUTO-ENTMCNC: 31.2 % (ref 32–34.5)
MCV RBC AUTO: 96.5 FL (ref 80–99.9)
METER GLUCOSE: 176 MG/DL (ref 74–99)
METER GLUCOSE: 202 MG/DL (ref 74–99)
METER GLUCOSE: 219 MG/DL (ref 74–99)
METER GLUCOSE: 255 MG/DL (ref 74–99)
METER GLUCOSE: 265 MG/DL (ref 74–99)
METER GLUCOSE: 49 MG/DL (ref 74–99)
METER GLUCOSE: 85 MG/DL (ref 74–99)
MONOCYTES ABSOLUTE: 0.65 E9/L (ref 0.1–0.95)
MONOCYTES RELATIVE PERCENT: 8.4 % (ref 2–12)
NEUTROPHILS ABSOLUTE: 3.06 E9/L (ref 1.8–7.3)
NEUTROPHILS RELATIVE PERCENT: 39.8 % (ref 43–80)
ORGANISM: ABNORMAL
PDW BLD-RTO: 17.6 FL (ref 11.5–15)
PLATELET # BLD: 245 E9/L (ref 130–450)
PMV BLD AUTO: 9.5 FL (ref 7–12)
POTASSIUM REFLEX MAGNESIUM: 3.7 MMOL/L (ref 3.5–5)
RBC # BLD: 3.45 E12/L (ref 3.5–5.5)
SODIUM BLD-SCNC: 140 MMOL/L (ref 132–146)
TOTAL PROTEIN: 5.8 G/DL (ref 6.4–8.3)
URINE CULTURE, ROUTINE: ABNORMAL
VANCOMYCIN RANDOM: 20 MCG/ML (ref 5–40)
WBC # BLD: 7.7 E9/L (ref 4.5–11.5)

## 2020-10-11 PROCEDURE — 90945 DIALYSIS ONE EVALUATION: CPT

## 2020-10-11 PROCEDURE — 36415 COLL VENOUS BLD VENIPUNCTURE: CPT

## 2020-10-11 PROCEDURE — 99233 SBSQ HOSP IP/OBS HIGH 50: CPT | Performed by: INTERNAL MEDICINE

## 2020-10-11 PROCEDURE — 82962 GLUCOSE BLOOD TEST: CPT

## 2020-10-11 PROCEDURE — 80048 BASIC METABOLIC PNL TOTAL CA: CPT

## 2020-10-11 PROCEDURE — 80202 ASSAY OF VANCOMYCIN: CPT

## 2020-10-11 PROCEDURE — 2580000003 HC RX 258: Performed by: INTERNAL MEDICINE

## 2020-10-11 PROCEDURE — 6360000002 HC RX W HCPCS: Performed by: INTERNAL MEDICINE

## 2020-10-11 PROCEDURE — 6370000000 HC RX 637 (ALT 250 FOR IP): Performed by: INTERNAL MEDICINE

## 2020-10-11 PROCEDURE — 2060000000 HC ICU INTERMEDIATE R&B

## 2020-10-11 PROCEDURE — 85025 COMPLETE CBC W/AUTO DIFF WBC: CPT

## 2020-10-11 PROCEDURE — 80076 HEPATIC FUNCTION PANEL: CPT

## 2020-10-11 RX ADMIN — METOLAZONE 5 MG: 2.5 TABLET ORAL at 08:00

## 2020-10-11 RX ADMIN — INSULIN LISPRO 3 UNITS: 100 INJECTION, SOLUTION INTRAVENOUS; SUBCUTANEOUS at 11:28

## 2020-10-11 RX ADMIN — BUMETANIDE 2 MG: 1 TABLET ORAL at 08:00

## 2020-10-11 RX ADMIN — Medication 10 ML: at 22:17

## 2020-10-11 RX ADMIN — GABAPENTIN 100 MG: 100 CAPSULE ORAL at 08:01

## 2020-10-11 RX ADMIN — HYDROCODONE BITARTRATE AND ACETAMINOPHEN 2 TABLET: 5; 325 TABLET ORAL at 11:29

## 2020-10-11 RX ADMIN — GABAPENTIN 100 MG: 100 CAPSULE ORAL at 22:04

## 2020-10-11 RX ADMIN — INSULIN LISPRO 1 UNITS: 100 INJECTION, SOLUTION INTRAVENOUS; SUBCUTANEOUS at 22:08

## 2020-10-11 RX ADMIN — HYDROCODONE BITARTRATE AND ACETAMINOPHEN 2 TABLET: 5; 325 TABLET ORAL at 22:02

## 2020-10-11 RX ADMIN — SODIUM CHLORIDE, PRESERVATIVE FREE 10 ML: 5 INJECTION INTRAVENOUS at 15:24

## 2020-10-11 RX ADMIN — DEXTROSE MONOHYDRATE 12.5 G: 25 INJECTION, SOLUTION INTRAVENOUS at 03:26

## 2020-10-11 RX ADMIN — HEPARIN SODIUM 5000 UNITS: 5000 INJECTION INTRAVENOUS; SUBCUTANEOUS at 13:52

## 2020-10-11 RX ADMIN — PROMETHAZINE HYDROCHLORIDE 12.5 MG: 25 TABLET ORAL at 08:08

## 2020-10-11 RX ADMIN — ATORVASTATIN CALCIUM 40 MG: 40 TABLET, FILM COATED ORAL at 22:03

## 2020-10-11 RX ADMIN — GABAPENTIN 100 MG: 100 CAPSULE ORAL at 13:52

## 2020-10-11 RX ADMIN — Medication 10 ML: at 09:00

## 2020-10-11 RX ADMIN — DILTIAZEM HYDROCHLORIDE 240 MG: 240 CAPSULE, COATED, EXTENDED RELEASE ORAL at 08:01

## 2020-10-11 RX ADMIN — ONDANSETRON 4 MG: 2 INJECTION INTRAMUSCULAR; INTRAVENOUS at 15:24

## 2020-10-11 RX ADMIN — INSULIN LISPRO 3 UNITS: 100 INJECTION, SOLUTION INTRAVENOUS; SUBCUTANEOUS at 17:20

## 2020-10-11 RX ADMIN — LORAZEPAM 0.5 MG: 0.5 TABLET ORAL at 22:04

## 2020-10-11 RX ADMIN — HYDROCODONE BITARTRATE AND ACETAMINOPHEN 2 TABLET: 5; 325 TABLET ORAL at 18:02

## 2020-10-11 RX ADMIN — METOPROLOL TARTRATE 100 MG: 50 TABLET, FILM COATED ORAL at 08:00

## 2020-10-11 RX ADMIN — BUMETANIDE 2 MG: 1 TABLET ORAL at 22:04

## 2020-10-11 RX ADMIN — HEPARIN SODIUM 5000 UNITS: 5000 INJECTION INTRAVENOUS; SUBCUTANEOUS at 22:05

## 2020-10-11 RX ADMIN — INSULIN LISPRO 3 UNITS: 100 INJECTION, SOLUTION INTRAVENOUS; SUBCUTANEOUS at 11:30

## 2020-10-11 RX ADMIN — LISINOPRIL 20 MG: 20 TABLET ORAL at 08:01

## 2020-10-11 RX ADMIN — METOPROLOL TARTRATE 100 MG: 50 TABLET, FILM COATED ORAL at 22:03

## 2020-10-11 RX ADMIN — INSULIN GLARGINE 10 UNITS: 100 INJECTION, SOLUTION SUBCUTANEOUS at 09:31

## 2020-10-11 RX ADMIN — INSULIN LISPRO 3 UNITS: 100 INJECTION, SOLUTION INTRAVENOUS; SUBCUTANEOUS at 17:21

## 2020-10-11 ASSESSMENT — PAIN SCALES - WONG BAKER: WONGBAKER_NUMERICALRESPONSE: 0

## 2020-10-11 ASSESSMENT — PAIN SCALES - GENERAL
PAINLEVEL_OUTOF10: 8
PAINLEVEL_OUTOF10: 6
PAINLEVEL_OUTOF10: 2
PAINLEVEL_OUTOF10: 0
PAINLEVEL_OUTOF10: 7
PAINLEVEL_OUTOF10: 4
PAINLEVEL_OUTOF10: 0
PAINLEVEL_OUTOF10: 0

## 2020-10-11 ASSESSMENT — PAIN DESCRIPTION - PROGRESSION
CLINICAL_PROGRESSION: GRADUALLY IMPROVING
CLINICAL_PROGRESSION: NOT CHANGED

## 2020-10-11 ASSESSMENT — PAIN DESCRIPTION - ONSET
ONSET: ON-GOING
ONSET: ON-GOING

## 2020-10-11 ASSESSMENT — PAIN DESCRIPTION - LOCATION
LOCATION: GENERALIZED;ABDOMEN
LOCATION: ABDOMEN
LOCATION: BACK

## 2020-10-11 ASSESSMENT — PAIN DESCRIPTION - FREQUENCY
FREQUENCY: CONTINUOUS
FREQUENCY: CONTINUOUS

## 2020-10-11 ASSESSMENT — PAIN DESCRIPTION - DESCRIPTORS
DESCRIPTORS: ACHING
DESCRIPTORS: CRAMPING;ACHING

## 2020-10-11 ASSESSMENT — PAIN DESCRIPTION - PAIN TYPE
TYPE: CHRONIC PAIN
TYPE: CHRONIC PAIN

## 2020-10-11 ASSESSMENT — PAIN DESCRIPTION - ORIENTATION: ORIENTATION: LOWER

## 2020-10-11 NOTE — PROGRESS NOTES
Pharmacy Consultation Note  (Antibiotic Dosing and Monitoring)    Initial consult date: 10/10/20  Consulting physician: Dr. Robert Cheema  Drug(s): Vancomycin  Indication: Positive blood culture    Ht Readings from Last 1 Encounters:   10/10/20 5' 4\" (1.626 m)     Wt Readings from Last 1 Encounters:   10/10/20 126 lb 12.2 oz (57.5 kg)         Age/  Gender IBW DW  Allergy Information   31 y.o.     female 54.7 kg 58.1 kg  Toradol [ketorolac tromethamine]                 Date  WBC CCPD Drug/Dose Time   Given Level(s)   (Time) Comments   10/10  (#1) 6.8 QHS  Vancomycin 1,000 mg IV x 1 1822     10/11  (#2) 7.7 QHS No dose -- 20.0 mcg/mL @ 1324      (#3)           (#4)           (#5)           (#6)           (#7)           Estimated Creatinine Clearance: 11 mL/min (A) (based on SCr of 6.8 mg/dL (H)). UOP over the past 24 hours:       Intake/Output Summary (Last 24 hours) at 10/11/2020 1405  Last data filed at 10/11/2020 1354  Gross per 24 hour   Intake 870 ml   Output 600 ml   Net 270 ml       Temp max: Temp (24hrs), Av.1 °F (36.7 °C), Min:97.5 °F (36.4 °C), Max:98.4 °F (36.9 °C)      Antibiotic Regimen: No other antibiotics at this time  Antibiotic Dose Date Initiated            Cultures:  available culture and sensitivity results were reviewed in EPIC  Cultures sent and are pending. Culture Date Result    Urine 10/8 E faecalis   Blood #1 10/8 Staph species   Blood #2 10/8 Staph species   Blood ID, molecular 10/8 Staph (not aureus) by PCR  Methicillin resistant by PCR     Assessment:  · Consulted by Dr. Robert Cheema to dose/monitor vancomycin  · Goal trough level:  15-20 mcg/mL  · Pt is a 31 yof admitted to the hospital for hyperglycemia. She is being initiated on vancomycin for a positive blood culture, final c/s pending.   · [place] is ESRD on PD, cycles nightly, nephro following  · 10/11: Random level @ 1324 = 20.0 mcg/mL    Plan:  · No dose  · Levels PRN  · Follow nephro notes/PD schedule  · Pharmacist will follow and monitor/adjust dosing as necessary      Thank you for the consult,    Zena Vasquez, PharmD, BCPS 10/11/2020 2:05 PM   [phone]

## 2020-10-11 NOTE — PLAN OF CARE
Problem: Pain:  Goal: Pain level will decrease  Description: Pain level will decrease  10/11/2020 0143 by Nasim Baptiste  Outcome: Met This Shift  10/10/2020 1751 by Sharda Martinez RN  Outcome: Met This Shift  Goal: Control of acute pain  Description: Control of acute pain  Outcome: Met This Shift  Goal: Control of chronic pain  Description: Control of chronic pain  Outcome: Met This Shift     Problem: Falls - Risk of:  Goal: Will remain free from falls  Description: Will remain free from falls  10/11/2020 0143 by Nasim Baptiste  Outcome: Met This Shift  10/10/2020 1751 by Sharda Martinez RN  Outcome: Met This Shift  Goal: Absence of physical injury  Description: Absence of physical injury  10/11/2020 0143 by Nasim Baptiste  Outcome: Met This Shift  10/10/2020 1751 by Sharda Martinez RN  Outcome: Met This Shift     Problem: Discharge Planning:  Goal: Discharged to appropriate level of care  Description: Discharged to appropriate level of care  Outcome: Met This Shift

## 2020-10-11 NOTE — CONSULTS
2017     Past Surgical History:   Procedure Laterality Date    BACK SURGERY      abscess     SECTION      x2    CHOLECYSTECTOMY, LAPAROSCOPIC N/A 2019    CHOLECYSTECTOMY LAPAROSCOPIC performed by Shabana Tatum MD at [address] [facility] N/A 2018    COLONOSCOPY WITH BIOPSY performed by Allie Elias MD at [address] [facility] COLONOSCOPY N/A 2018    COLONOSCOPY WITH BIOPSY performed by Jody Nolasco MD at [address]  2012    EF 57%    ECHO COMPL W DOP COLOR FLOW  6/10/2013         LAPAROSCOPY INSERTION PERITONEAL CATHETER N/A 2020    LAPAROSCOPIC INSERTION PERITONEAL DIALYSIS CATHETER performed by Jm Cristina MD at [address] ESOPHAGOGASTRODUODENOSCOPY TRANSORAL DIAGNOSTIC N/A 2018    EGD ESOPHAGOGASTRODUODENOSCOPY performed by Allie Elias MD at [address] [facility] TUNNELED VENOUS PORT PLACEMENT  2018    UPPER GASTROINTESTINAL ENDOSCOPY  2018    EGD BIOPSY performed by Jody Nolasco MD at [address] [facility] N/A 10/11/2019    EGD ESOPHAGOGASTRODUODENOSCOPY performed by Beth Ramírez DO at [address]  family history includes Asthma in her brother and mother; Diabetes in her mother; High Blood Pressure in her father and mother; Hypertension in her mother.   SOCIAL HISTORY  Social History     Socioeconomic History    Marital status: Single     Spouse name: Not on file    Number of children: Not on file    Years of education: Not on file    Highest education level: Not on file   Occupational History    Not on file   Social Needs    Financial resource strain: Very hard    Food insecurity     Worry: Never true     Inability: Never true    Transportation needs     Medical: No     Non-medical: No   Tobacco Use    Smoking status: Current Every Day Smoker     Packs/day: 0.50     Years: 6.00     Pack years: 3.00     Types: Cigarettes    Smokeless tobacco: Current User   Substance and Sexual Activity    Alcohol use: No    Drug use: No     Comment: documented prior history of opioid abuse    Sexual activity: Yes     Partners: Male   Lifestyle    Physical activity     Days per week: Not on file     Minutes per session: Not on file    Stress: Not on file   Relationships    Social connections     Talks on phone: Not on file     Gets together: Not on file     Attends Hinduism service: Not on file     Active member of club or organization: Not on file     Attends meetings of clubs or organizations: Not on file     Relationship status: Not on file    Intimate partner violence     Fear of current or ex partner: Not on file     Emotionally abused: Not on file     Physically abused: Not on file     Forced sexual activity: Not on file   Other Topics Concern    Not on file   Social History Narrative    Not on file         Current Medications:     Scheduled Meds:   vancomycin (VANCOCIN) intermittent dosing (placeholder)   Other RX Placeholder    insulin lispro  0-6 Units Subcutaneous TID WC    insulin lispro  0-3 Units Subcutaneous Nightly    epoetin nena-epbx  8,000 Units Subcutaneous Once per day on Mon Wed Fri    insulin glargine  10 Units Subcutaneous QAM    insulin NPH  5 Units Subcutaneous Nightly    insulin lispro  3 Units Subcutaneous TID WC    sodium chloride flush  10 mL Intravenous 2 times per day    atorvastatin  40 mg Oral Nightly    bumetanide  2 mg Oral BID    dilTIAZem  240 mg Oral Daily    gabapentin  100 mg Oral TID    levothyroxine  50 mcg Oral Daily    lisinopril  20 mg Oral Daily    metOLazone  5 mg Oral Daily    metoprolol  100 mg Oral BID    sodium chloride flush  10 mL Intravenous 2 times per day    heparin (porcine)  5,000 Units Subcutaneous 3 times per day     Continuous Infusions:   dextrose       PRN Meds:HYDROcodone 5 mg - acetaminophen **OR** HYDROcodone 5 mg - acetaminophen, sodium chloride flush, acetaminophen, cloNIDine, LORazepam, sodium chloride flush, acetaminophen **OR** acetaminophen, magnesium hydroxide, promethazine **OR** ondansetron, glucose, dextrose, glucagon (rDNA), dextrose      PHYSICAL EXAM:  Vitals:    10/10/20 2330 10/11/20 0020 10/11/20 0430 10/11/20 0800   BP: 132/76  128/76 (!) 102/59   Pulse: 83 90 79 78   Resp: 14 15 15 15   Temp: 98.4 °F (36.9 °C)   97.5 °F (36.4 °C)   TempSrc: Oral   Oral   SpO2: 96%   97%   Weight:       Height:           General Appearance:       Alert, cooperative, no distress, appears stated age        Heent:    Normocephalic, atraumatic,     PERRL, conjunctiva/corneas clear     no drainage or sinus tenderness      Neck:   Supple, symmetrical, trachea midline   Back:     Symmetric, no CVA tenderness   Lungs:     Clear to auscultation bilaterally, respirations unlabored   Chest Wall:    No tenderness or deformity    Heart:    Regular rate and rhythm, S1 and S2 normal, no murmur, rub or   gallop   Abdomen:     Soft, non-tender, bowel sounds active all four quadrants         Extremities:   Extremities normal, atraumatic, no cyanosis or edema   Pulses:   LE extremities   Skin:   Skin color, texture, turgor normal, no rashes or lesions   Lymph nodes:   Cervical, supraclavicular, and axillary nodes normal   Neurologic:   CNII-XII intact, no focal deficits    PD cath   Left chest MDPT       LABS AND DATA REVIEW:     Recent Labs     10/09/20  0536 10/10/20  0752 10/11/20  0527   WBC 9.1 6.8 7.7   HGB 10.0* 9.5* 10.4*   HCT 30.6* 30.1* 33.3*   MCV 92.2 93.5 96.5    220 245     Recent Labs     10/09/20  0536 10/10/20  0752 10/11/20  0527    137 140   K 3.2* 3.3* 3.7   CL 98 99 104   CO2 26 24 25   BUN 34* 31* 38*   CREATININE 6.7* 6.2* 6.8*   GFRAA 9 10 9   LABGLOM 9 10 9   GLUCOSE 37* 91 23*   PROT 5.5* 5.8* 5.8*   LABALBU 2.8* 3.6 3.4*   CALCIUM 8.3* 8.5* 8.6   BILITOT <0.2 <0.2 <0.2   ALKPHOS 110* 88 91   AST 20 19 16   ALT 16 12 13       BLOOD CX #1  Recent Labs 10/08/20  1820   BC Gram stain performed from blood culture bottle media  Gram positive cocci in clusters  *  Identification and sensitivity to follow     BLOOD CX #2  Recent Labs     10/08/20  1835   BLOODCULT2 Gram stain performed from blood culture bottle media  Gram positive cocci in clusters  *  Identification and sensitivity to follow       WOUND culture  No results for input(s): WNDABS in the last 72 hours. URINE CULTURE  Recent Labs     10/08/20  1526   LABURIN 50,000 CFU/ml         ASSESSMENT & PLAN  Asymptomatic bacteruria - hx VRE   Staph bacteremia   ESRD on PD  Gastroperesis   Hx Trich - treated June 2020  Uncontrolled DM  Mediport x 2 years     Continue Vancomycin  Concern for mediport infection    Repeat blood cultures   Will not treat VRE in urine - she is asymptomatic   All labs,imaging, cultures reviewed     Thank you for consult. 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 I have seen and examined the patient and the key elements of the encounter have been performed by me. I agree with the assessment, plan and orders as documented.       Treatment plan as per my recommendation     Sammi Nice MD, FACP  10/11/2020  6:22 PM

## 2020-10-11 NOTE — PROGRESS NOTES
Department of Internal Medicine  Nephrology Progress Note      Reason for Consult:  PD  Requesting Physician:  Dr. Benito Pulliam:  Hyperglycemia    History Obtained From:  patient, electronic medical record    HISTORY OF PRESENT ILLNESS:      Briefly, Karen Bhandari is a 31year-old female with history of ESRD on PD, with severe proteinuria, poorly controlled type I DM with multiple admissions for DKA, gastroparesis, HTN who presented to the ER on 10/8/2020 with complaints of hyperglycemia, vomiting and back pain. She reported that these symptoms have been present for approximately 1 month and that they had been getting worse over the last two days. She was admitted for further evaluation and treatment.  We are consulted for management of ESRD and PD.    10/10/20:  Doing better today  Tolerated CCPD well    10/11/20  UF 5 las t 24 hours with CCPD  Feels better    Current Medications:    Current Facility-Administered Medications: vancomycin (VANCOCIN) intermittent dosing (placeholder), , Other, RX Placeholder  insulin lispro (HUMALOG) injection vial 0-6 Units, 0-6 Units, Subcutaneous, TID WC  insulin lispro (HUMALOG) injection vial 0-3 Units, 0-3 Units, Subcutaneous, Nightly  epoetin nena-epbx (RETACRIT) injection 8,000 Units, 8,000 Units, Subcutaneous, Once per day on Mon Wed Fri  HYDROcodone-acetaminophen (NORCO) 5-325 MG per tablet 1 tablet, 1 tablet, Oral, Q4H PRN **OR** HYDROcodone-acetaminophen (NORCO) 5-325 MG per tablet 2 tablet, 2 tablet, Oral, Q4H PRN  insulin glargine (LANTUS) injection vial 10 Units, 10 Units, Subcutaneous, QAM  insulin NPH (HUMULIN N;NOVOLIN N) injection vial 5 Units, 5 Units, Subcutaneous, Nightly  insulin lispro (HUMALOG) injection vial 3 Units, 3 Units, Subcutaneous, TID WC  sodium chloride flush 0.9 % injection 10 mL, 10 mL, Intravenous, 2 times per day  sodium chloride flush 0.9 % injection 10 mL, 10 mL, Intravenous, PRN  acetaminophen (TYLENOL) tablet 650 mg, 650 mg, Oral, Q4H PRN  atorvastatin (LIPITOR) tablet 40 mg, 40 mg, Oral, Nightly  bumetanide (BUMEX) tablet 2 mg, 2 mg, Oral, BID  cloNIDine (CATAPRES) tablet 0.1 mg, 0.1 mg, Oral, BID PRN  dilTIAZem (CARDIZEM CD) extended release capsule 240 mg, 240 mg, Oral, Daily  gabapentin (NEURONTIN) capsule 100 mg, 100 mg, Oral, TID  levothyroxine (SYNTHROID) tablet 50 mcg, 50 mcg, Oral, Daily  lisinopril (PRINIVIL;ZESTRIL) tablet 20 mg, 20 mg, Oral, Daily  LORazepam (ATIVAN) tablet 0.5 mg, 0.5 mg, Oral, BID PRN  metOLazone (ZAROXOLYN) tablet 5 mg, 5 mg, Oral, Daily  metoprolol tartrate (LOPRESSOR) tablet 100 mg, 100 mg, Oral, BID  sodium chloride flush 0.9 % injection 10 mL, 10 mL, Intravenous, 2 times per day  sodium chloride flush 0.9 % injection 10 mL, 10 mL, Intravenous, PRN  acetaminophen (TYLENOL) tablet 650 mg, 650 mg, Oral, Q6H PRN **OR** acetaminophen (TYLENOL) suppository 650 mg, 650 mg, Rectal, Q6H PRN  magnesium hydroxide (MILK OF MAGNESIA) 400 MG/5ML suspension 30 mL, 30 mL, Oral, Daily PRN  promethazine (PHENERGAN) tablet 12.5 mg, 12.5 mg, Oral, Q6H PRN **OR** ondansetron (ZOFRAN) injection 4 mg, 4 mg, Intravenous, Q6H PRN  heparin (porcine) injection 5,000 Units, 5,000 Units, Subcutaneous, 3 times per day  glucose (GLUTOSE) 40 % oral gel 15 g, 15 g, Oral, PRN  dextrose 50 % IV solution, 12.5 g, Intravenous, PRN  glucagon (rDNA) injection 1 mg, 1 mg, Intramuscular, PRN  dextrose 5 % solution, 100 mL/hr, Intravenous, PRN  Allergies:   Toradol [ketorolac tromethamine]    PHYSICAL EXAM:      Vitals:    VITALS:  BP (!) 94/54   Pulse 91   Temp 98.1 °F (36.7 °C) (Oral)   Resp 15   Ht 5' 4\" (1.626 m)   Wt 126 lb 12.2 oz (57.5 kg)   SpO2 98%   BMI 21.76 kg/m²   24HR INTAKE/OUTPUT:      Intake/Output Summary (Last 24 hours) at 10/11/2020 1608  Last data filed at 10/11/2020 1354  Gross per 24 hour   Intake 870 ml   Output 600 ml   Net 270 ml       Constitutional:  Alert and oriented, chronically ill  HEENT:  Normocephalic, PERRL  Respiratory:  CTA bilaterally, on room air  Cardiovascular/Edema:  RRR,S1/S2  Gastrointestinal:  Soft, nondistended, nontender, bowel sounds active, PD catheter intact and clean  Neurologic:  Nonfocal, AVELAR  Skin:  Warm, dry, no lesions  Other:  No edema, L chest mediport    DATA:    CBC with Differential:    Lab Results   Component Value Date    WBC 7.7 10/11/2020    RBC 3.45 10/11/2020    HGB 10.4 10/11/2020    HCT 33.3 10/11/2020     10/11/2020    MCV 96.5 10/11/2020    MCH 30.1 10/11/2020    MCHC 31.2 10/11/2020    RDW 17.6 10/11/2020    NRBC 0.9 08/10/2020    SEGSPCT 67 06/27/2013    METASPCT 1.7 08/10/2020    LYMPHOPCT 42.6 10/11/2020    MONOPCT 8.4 10/11/2020    BASOPCT 1.2 10/11/2020    MONOSABS 0.65 10/11/2020    LYMPHSABS 3.28 10/11/2020    EOSABS 0.58 10/11/2020    BASOSABS 0.09 10/11/2020     CMP:    Lab Results   Component Value Date     10/11/2020    K 3.7 10/11/2020     10/11/2020    CO2 25 10/11/2020    BUN 38 10/11/2020    CREATININE 6.8 10/11/2020    GFRAA 9 10/11/2020    LABGLOM 9 10/11/2020    GLUCOSE 23 10/11/2020    GLUCOSE 130 05/18/2012    PROT 5.8 10/11/2020    LABALBU 3.4 10/11/2020    LABALBU 4.1 05/18/2012    CALCIUM 8.6 10/11/2020    BILITOT <0.2 10/11/2020    ALKPHOS 91 10/11/2020    AST 16 10/11/2020    ALT 13 10/11/2020     Magnesium:    Lab Results   Component Value Date    MG 2.2 10/10/2020     Phosphorus:    Lab Results   Component Value Date    PHOS 6.1 10/07/2020     Radiology Review:    CXR 10/8/2020    No evidence of pneumonia or pleural effusion. IMPRESSION/RECOMMENDATIONS:      Briefly, Karen Bhandari is a 31year-old female with history of ESRD on PD, with severe proteinuria, poorly controlled type I DM with multiple admissions for DKA, gastroparesis, HTN who presented to the ER on 10/8/2020 with complaints of hyperglycemia, vomiting and back pain.  She reported that these symptoms have been present for approximately 1 month and that they had been getting worse over the last two days. She was admitted for further evaluation and treatment. We are consulted for management of ESRD and PD.      1. ESRD on PD, with severe proteinuria, nephrotic range, due to diabetic nephropathy. To continue with home PD Rx.    2. Hypokalemia 2/2 clearance with PD and bumex/metolazone. For careful supplementation. 3. Type I DM, poorly controlled, glucose on admission 264; glucose levels labile. On SSI and lantus. 4. Hx Intractable nausea and vomiting 2/2 diabetic gastroparesis, denies complaints of both today. 5. Hx Chronic diarrhea, diabetic enteropathy, small intestinal bacterial overgrowth?, celiac sprue?, microscopic colitis? 6. Asymptomatic bacteriuria, culture with GPC  2. HTN, on metoprolol, Bumex, metolazone and diltiazem  3. HLD, on atorvastatin  4. Hypoalbuminemia 2/2 poor nutrition. For supplementation  5. Anemia of CKD, to resume retacrit  8,000 units tiw     Plan:     · Albumin 25 g IV Q8 hours x 3 doses  · Potassium 20 mEq PO x 1  · Continue CCPD 2 liters exchanges of 1.5% x 4 (every 2 hours)  · Resume MARIA TERESA  · ID input appreciated      Thank you Dr. Vamsi Silva for this consultation.     Electronically signed by Jose Perkins MD on 10/11/2020 at 4:08 PM 95

## 2020-10-11 NOTE — PLAN OF CARE
Problem: Pain:  Description: Pain management should include both nonpharmacologic and pharmacologic interventions. Goal: Pain level will decrease  Outcome: Met This Shift     Problem: Pain:  Description: Pain management should include both nonpharmacologic and pharmacologic interventions. Goal: Control of acute pain  Outcome: Met This Shift     Problem: Pain:  Description: Pain management should include both nonpharmacologic and pharmacologic interventions.   Goal: Control of chronic pain  Outcome: Met This Shift     Problem: Falls - Risk of:  Goal: Will remain free from falls  Outcome: Met This Shift     Problem: Falls - Risk of:  Goal: Absence of physical injury  Outcome: Met This Shift     Problem: Serum Glucose Level - Abnormal:  Goal: Ability to maintain appropriate glucose levels will improve  Outcome: Met This Shift     Problem: Sensory Perception - Impaired:  Goal: Ability to maintain a stable neurologic state will improve  Outcome: Met This Shift

## 2020-10-12 ENCOUNTER — APPOINTMENT (OUTPATIENT)
Dept: GENERAL RADIOLOGY | Age: 30
DRG: 721 | End: 2020-10-12
Payer: COMMERCIAL

## 2020-10-12 LAB
METER GLUCOSE: 175 MG/DL (ref 74–99)
METER GLUCOSE: 240 MG/DL (ref 74–99)
METER GLUCOSE: 52 MG/DL (ref 74–99)
METER GLUCOSE: 61 MG/DL (ref 74–99)
METER GLUCOSE: 84 MG/DL (ref 74–99)
METER GLUCOSE: 98 MG/DL (ref 74–99)
ORGANISM: ABNORMAL
ORGANISM: ABNORMAL
VANCOMYCIN RANDOM: 16.4 MCG/ML (ref 5–40)

## 2020-10-12 PROCEDURE — 2580000003 HC RX 258: Performed by: INTERNAL MEDICINE

## 2020-10-12 PROCEDURE — 99232 SBSQ HOSP IP/OBS MODERATE 35: CPT | Performed by: INTERNAL MEDICINE

## 2020-10-12 PROCEDURE — 6370000000 HC RX 637 (ALT 250 FOR IP): Performed by: INTERNAL MEDICINE

## 2020-10-12 PROCEDURE — 6360000002 HC RX W HCPCS: Performed by: INTERNAL MEDICINE

## 2020-10-12 PROCEDURE — 80202 ASSAY OF VANCOMYCIN: CPT

## 2020-10-12 PROCEDURE — 36415 COLL VENOUS BLD VENIPUNCTURE: CPT

## 2020-10-12 PROCEDURE — 6360000002 HC RX W HCPCS: Performed by: NURSE PRACTITIONER

## 2020-10-12 PROCEDURE — 82962 GLUCOSE BLOOD TEST: CPT

## 2020-10-12 PROCEDURE — 90945 DIALYSIS ONE EVALUATION: CPT

## 2020-10-12 PROCEDURE — 73562 X-RAY EXAM OF KNEE 3: CPT

## 2020-10-12 PROCEDURE — 87040 BLOOD CULTURE FOR BACTERIA: CPT

## 2020-10-12 PROCEDURE — 2060000000 HC ICU INTERMEDIATE R&B

## 2020-10-12 PROCEDURE — APPSS30 APP SPLIT SHARED TIME 16-30 MINUTES: Performed by: NURSE PRACTITIONER

## 2020-10-12 PROCEDURE — 73130 X-RAY EXAM OF HAND: CPT

## 2020-10-12 RX ORDER — MORPHINE SULFATE 2 MG/ML
2 INJECTION, SOLUTION INTRAMUSCULAR; INTRAVENOUS ONCE
Status: COMPLETED | OUTPATIENT
Start: 2020-10-12 | End: 2020-10-12

## 2020-10-12 RX ADMIN — DILTIAZEM HYDROCHLORIDE 240 MG: 240 CAPSULE, COATED, EXTENDED RELEASE ORAL at 08:34

## 2020-10-12 RX ADMIN — HEPARIN SODIUM 5000 UNITS: 5000 INJECTION INTRAVENOUS; SUBCUTANEOUS at 15:17

## 2020-10-12 RX ADMIN — INSULIN GLARGINE 10 UNITS: 100 INJECTION, SOLUTION SUBCUTANEOUS at 08:32

## 2020-10-12 RX ADMIN — EPOETIN ALFA-EPBX 8000 UNITS: 4000 INJECTION, SOLUTION INTRAVENOUS; SUBCUTANEOUS at 08:33

## 2020-10-12 RX ADMIN — MORPHINE SULFATE 2 MG: 2 INJECTION, SOLUTION INTRAMUSCULAR; INTRAVENOUS at 20:11

## 2020-10-12 RX ADMIN — INSULIN LISPRO 1 UNITS: 100 INJECTION, SOLUTION INTRAVENOUS; SUBCUTANEOUS at 16:20

## 2020-10-12 RX ADMIN — GABAPENTIN 100 MG: 100 CAPSULE ORAL at 08:34

## 2020-10-12 RX ADMIN — Medication 10 ML: at 08:37

## 2020-10-12 RX ADMIN — Medication 10 ML: at 08:38

## 2020-10-12 RX ADMIN — INSULIN LISPRO 2 UNITS: 100 INJECTION, SOLUTION INTRAVENOUS; SUBCUTANEOUS at 08:33

## 2020-10-12 RX ADMIN — HEPARIN SODIUM 5000 UNITS: 5000 INJECTION INTRAVENOUS; SUBCUTANEOUS at 22:07

## 2020-10-12 RX ADMIN — LORAZEPAM 0.5 MG: 0.5 TABLET ORAL at 21:28

## 2020-10-12 RX ADMIN — METOPROLOL TARTRATE 100 MG: 50 TABLET, FILM COATED ORAL at 20:12

## 2020-10-12 RX ADMIN — GABAPENTIN 100 MG: 100 CAPSULE ORAL at 20:12

## 2020-10-12 RX ADMIN — ONDANSETRON 4 MG: 2 INJECTION INTRAMUSCULAR; INTRAVENOUS at 11:37

## 2020-10-12 RX ADMIN — HYDROCODONE BITARTRATE AND ACETAMINOPHEN 1 TABLET: 5; 325 TABLET ORAL at 11:37

## 2020-10-12 RX ADMIN — GABAPENTIN 100 MG: 100 CAPSULE ORAL at 15:16

## 2020-10-12 RX ADMIN — METOPROLOL TARTRATE 100 MG: 50 TABLET, FILM COATED ORAL at 08:34

## 2020-10-12 RX ADMIN — ONDANSETRON 4 MG: 2 INJECTION INTRAMUSCULAR; INTRAVENOUS at 02:43

## 2020-10-12 RX ADMIN — BUMETANIDE 2 MG: 1 TABLET ORAL at 22:07

## 2020-10-12 RX ADMIN — LISINOPRIL 20 MG: 20 TABLET ORAL at 08:34

## 2020-10-12 RX ADMIN — INSULIN LISPRO 3 UNITS: 100 INJECTION, SOLUTION INTRAVENOUS; SUBCUTANEOUS at 16:21

## 2020-10-12 RX ADMIN — HYDROCODONE BITARTRATE AND ACETAMINOPHEN 2 TABLET: 5; 325 TABLET ORAL at 16:20

## 2020-10-12 RX ADMIN — Medication 10 ML: at 20:12

## 2020-10-12 RX ADMIN — INSULIN LISPRO 3 UNITS: 100 INJECTION, SOLUTION INTRAVENOUS; SUBCUTANEOUS at 08:35

## 2020-10-12 RX ADMIN — LORAZEPAM 0.5 MG: 0.5 TABLET ORAL at 11:37

## 2020-10-12 RX ADMIN — ATORVASTATIN CALCIUM 40 MG: 40 TABLET, FILM COATED ORAL at 20:12

## 2020-10-12 RX ADMIN — HYDROCODONE BITARTRATE AND ACETAMINOPHEN 2 TABLET: 5; 325 TABLET ORAL at 22:07

## 2020-10-12 RX ADMIN — MORPHINE SULFATE 2 MG: 2 INJECTION, SOLUTION INTRAMUSCULAR; INTRAVENOUS at 05:08

## 2020-10-12 RX ADMIN — METOLAZONE 5 MG: 2.5 TABLET ORAL at 08:33

## 2020-10-12 RX ADMIN — HYDROCODONE BITARTRATE AND ACETAMINOPHEN 2 TABLET: 5; 325 TABLET ORAL at 02:43

## 2020-10-12 RX ADMIN — BUMETANIDE 2 MG: 1 TABLET ORAL at 08:34

## 2020-10-12 ASSESSMENT — PAIN DESCRIPTION - ORIENTATION: ORIENTATION: LOWER

## 2020-10-12 ASSESSMENT — PAIN SCALES - GENERAL
PAINLEVEL_OUTOF10: 10
PAINLEVEL_OUTOF10: 8
PAINLEVEL_OUTOF10: 5
PAINLEVEL_OUTOF10: 10
PAINLEVEL_OUTOF10: 10
PAINLEVEL_OUTOF10: 6
PAINLEVEL_OUTOF10: 10

## 2020-10-12 ASSESSMENT — PAIN DESCRIPTION - PAIN TYPE: TYPE: CHRONIC PAIN

## 2020-10-12 ASSESSMENT — PAIN DESCRIPTION - LOCATION: LOCATION: BACK

## 2020-10-12 NOTE — PROGRESS NOTES
----- Message from Debora Romero MD sent at 7/19/2017 11:11 AM CDT -----  1 of the liver enzymes continues to be slightly elevated. Repeat liver function test in 3 months. Thanks   Patient sugar 61 at 1125. Given orange juice and duyen crackers. It came up to 84 at 0911 34 76 33 and patient began eating lunch at this time as well. Lunch time insulin was held. 4 pm blood sugar check was 175 and treated with insulin per orders.

## 2020-10-12 NOTE — PROGRESS NOTES
Pharmacy Consultation Note  (Antibiotic Dosing and Monitoring)    Initial consult date: 10/10/20  Consulting physician: Dr. Rakel Pimentel  Drug(s): Vancomycin  Indication: Positive blood culture    Ht Readings from Last 1 Encounters:   10/10/20 5' 4\" (1.626 m)     Wt Readings from Last 1 Encounters:   10/10/20 126 lb 12.2 oz (57.5 kg)         Age/  Gender IBW DW  Allergy Information   31 y.o.     female 54.7 kg 58.1 kg  Toradol [ketorolac tromethamine]                 Date  WBC CCPD Drug/Dose Time   Given Level(s)   (Time) Comments   10/10  (#1) 6.8 QHS  Vancomycin 1,000 mg IV x 1 1822     10/11  (#2) 7.7 QHS No dose -- 20.0 mcg/mL @ 1324    10/12  (#3) -- QHS No dose -- 16.4 mcg/mL @ 1511      (#4)           (#5)           (#6)           (#7)           Estimated Creatinine Clearance: 11 mL/min (A) (based on SCr of 6.8 mg/dL (H)). UOP over the past 24 hours:       Intake/Output Summary (Last 24 hours) at 10/12/2020 1157  Last data filed at 10/11/2020 1851  Gross per 24 hour   Intake 540 ml   Output --   Net 540 ml       Temp max: Temp (24hrs), Av.9 °F (36.6 °C), Min:97.5 °F (36.4 °C), Max:98.1 °F (36.7 °C)      Antibiotic Regimen: No other antibiotics at this time  Antibiotic Dose Date Initiated          Cultures:  available culture and sensitivity results were reviewed in EPIC  Cultures sent and are pending. Culture Date Result    Urine 10/8 E faecalis   Blood #1 10/8 Methicillin-resistant CoNS   Blood #2 10/8 Methicillin-resistant CoNS     Assessment:  · Consulted by Dr. Rakel Pimentel to dose/monitor vancomycin  · Goal trough level:  15-20 mcg/mL  · Pt is a 31 yof admitted to the hospital for hyperglycemia. She is being initiated on vancomycin for a positive blood culture, final c/s pending.   · Witlon is ESRD on PD, cycles nightly, nephro following  · 10/11: Random level @ 1324 = 20.0 mcg/mL    Plan:  · No dose vancomycin today  · Repeat random tomorrow after CCPD overnight  · Follow nephro notes/PD schedule  · Pharmacist will follow and monitor/adjust dosing as necessary      Thank you for the consult,    Carly Gonzalez, PharmD 10/12/2020 12:07 PM   [phone]

## 2020-10-12 NOTE — CARE COORDINATION
MICHEL Note: 10/12/2020 at 12:27pm: COVID test not done. CM called and spoke to the patient in her room. States she does not feel much better. Remains on RA. On peritoneal dialysis. ID and Renal are following. On Vancomycin. States her plans are to return home when discharged and her mom or boyfriend will provide transportation. Will need to watch for IV antibiotics on discharge.  Anu Peck RN

## 2020-10-12 NOTE — PROGRESS NOTES
[facility]  [facility]  Progress Note    NAME:Wilton Flores  1990  DATE OF SERVICE:10/12/20    Pt was seen FACE TO FACE FOR atbx    SUBJECTIVE:  Chief Complaint   Patient presents with    Hyperglycemia     blood sugar high today vomiting back pain   s/p fall left knee and hand  Has pain going for xrys  Afebrile   Patient is tolerating medications. No reported adverse drug reactions. Review of systems:  As stated above in the chief complaint, otherwise negative.     Medications:  Scheduled Meds:   vancomycin (VANCOCIN) intermittent dosing (placeholder)   Other RX Placeholder    insulin lispro  0-6 Units Subcutaneous TID WC    insulin lispro  0-3 Units Subcutaneous Nightly    epoetin nena-epbx  8,000 Units Subcutaneous Once per day on     insulin glargine  10 Units Subcutaneous QAM    [Held by provider] insulin NPH  5 Units Subcutaneous Nightly    insulin lispro  3 Units Subcutaneous TID WC    sodium chloride flush  10 mL Intravenous 2 times per day    atorvastatin  40 mg Oral Nightly    bumetanide  2 mg Oral BID    dilTIAZem  240 mg Oral Daily    gabapentin  100 mg Oral TID    levothyroxine  50 mcg Oral Daily    lisinopril  20 mg Oral Daily    metOLazone  5 mg Oral Daily    metoprolol  100 mg Oral BID    sodium chloride flush  10 mL Intravenous 2 times per day    heparin (porcine)  5,000 Units Subcutaneous 3 times per day     Continuous Infusions:   dextrose       PRN Meds:HYDROcodone 5 mg - acetaminophen **OR** HYDROcodone 5 mg - acetaminophen, sodium chloride flush, acetaminophen, cloNIDine, LORazepam, sodium chloride flush, acetaminophen **OR** acetaminophen, magnesium hydroxide, promethazine **OR** ondansetron, glucose, dextrose, glucagon (rDNA), dextrose    OBJECTIVE:  /78   Pulse 80   Temp 98 °F (36.7 °C) (Oral)   Resp 16   Ht 5' 4\" (1.626 m)   Wt 126 lb 12.2 oz (57.5 kg)   SpO2 100%   BMI 21.76 kg/m²   Temp  Av.9 °F (36.6 °C)  Min: 97.5 °F (36.4 °C)  Max: 98.1 °F (36.7 °C)  Constitutional:  The patient is awake, alert, and oriented. Skin:    Warm and dry. No rashes were noted. HEENT:   Round and reactive pupils. AT/NC  Neck:    Supple to movements. Chest:   No use of accessory muscles to breathe. Symmetrical expansion. Cardiovascular:  S1 and S2 are rhythmic and regular. No murmurs appreciated. Abdomen:   Positive bowel sounds to auscultation. Benign to palpation. pd  Extremities:   No clubbing, no cyanosis, edema. CNS    AAxO   Lines:     Radiology:  Laboratory and Tests Review:  Lab Results   Component Value Date    WBC 7.7 10/11/2020    WBC 6.8 10/10/2020    WBC 9.1 10/09/2020    HGB 10.4 (L) 10/11/2020    HCT 33.3 (L) 10/11/2020    MCV 96.5 10/11/2020     10/11/2020     No results found for: RUST  Lab Results   Component Value Date    ALT 13 10/11/2020    AST 16 10/11/2020    ALKPHOS 91 10/11/2020    BILITOT <0.2 10/11/2020     Lab Results   Component Value Date     10/11/2020    K 3.7 10/11/2020     10/11/2020    CO2 25 10/11/2020    BUN 38 10/11/2020    CREATININE 6.8 10/11/2020    CREATININE 6.2 10/10/2020    CREATININE 6.7 10/09/2020    GFRAA 9 10/11/2020    LABGLOM 9 10/11/2020    GLUCOSE 23 10/11/2020    GLUCOSE 130 05/18/2012    PROT 5.8 10/11/2020    LABALBU 3.4 10/11/2020    LABALBU 4.1 05/18/2012    CALCIUM 8.6 10/11/2020    BILITOT <0.2 10/11/2020    ALKPHOS 91 10/11/2020    AST 16 10/11/2020    ALT 13 10/11/2020     Lab Results   Component Value Date    CRP 43.1 (H) 11/01/2019    CRP 0.3 10/02/2019    CRP 0.4 09/28/2017     Lab Results   Component Value Date    SEDRATE 135 (H) 11/01/2019    SEDRATE 14 09/28/2017       Microbiology:   No results for input(s): COVID19 in the last 72 hours.   Lab Results   Component Value Date    BLOODCULT2 5 Days no growth 07/18/2020    BLOODCULT2 5 Days- no growth 02/09/2020    BLOODCULT2 5 Days- no growth 02/08/2020    ORG Staphylococcus epidermidis 10/08/2020    ORG Staphylococcus epidermidis 10/08/2020    ORG Enterococcus faecalis 10/08/2020     WOUND/ABSCESS   Date Value Ref Range Status   12/11/2014 (A)  Final    Mixed yulisa also isolated, including:  Alpha hemolytic Strep species  Corynebacteria     12/11/2014 Heavy growth  Final     No results found for: RESPSMEAR  No results found for: MPNEUMO, CLAMYDCU, LABLEGI, AFBCX, FUNGSM, LABFUNG    MRSA Culture Only   Date Value Ref Range Status   10/30/2019 Methicillin resistant Staph aureus not isolated  Final        ASSESSMENT/PLAN:  Cons bacteremia has left MediPort and RIJ HD CATH   E. faecalis bacteruria   S/p fall   -most likely line infection  -f/u blood cx  Esr/crp YULISSA  vancomycin (VANCOCIN) intermittent dosing (placeholder), RX Placeholder         · Monitor labs    Imaging and labs were reviewed per medical records. The patient was educated about the diagnosis, prognosis, indications, risks and benefits of treatment. An opportunity to ask questions was given to the patient/FAMILY. Thank you for involving me in the care of [address] [place] [address] I will continue to follow. Please do not hesitate to call for any questions or concerns.     Electronically signed by Diego Mcmullen MD on 10/12/2020 at 2:05 PM

## 2020-10-12 NOTE — PROGRESS NOTES
RN called hospitalist to advised that patient stated her pain medication didn't work, pain 10/10. Please see new orders for pain medication.    Electronically signed by Vibha Up RN on 10/12/2020 at 7:43 PM

## 2020-10-12 NOTE — PLAN OF CARE
Problem: Falls - Risk of:  Goal: Will remain free from falls  Description: Will remain free from falls  10/12/2020 0639 by Marcia Thomas RN  Outcome: Met This Shift    Goal: Absence of physical injury  Description: Absence of physical injury  10/12/2020 5604 by Marcia Thomas RN  Outcome: Met This Shift    Problem: Sensory Perception - Impaired:  Goal: Ability to maintain a stable neurologic state will improve  Description: Ability to maintain a stable neurologic state will improve  10/12/2020 0639 by Marcia Thomas RN  Outcome: Met This Shift    Problem: Discharge Planning:  Goal: Discharged to appropriate level of care  Description: Discharged to appropriate level of care  Outcome: Ongoing     Problem: Serum Glucose Level - Abnormal:  Goal: Ability to maintain appropriate glucose levels will improve  Description: Ability to maintain appropriate glucose levels will improve  10/12/2020 0639 by Marcia Thomas RN  Outcome: Ongoing  10/11/2020 1756 by Candido Galvan RN  Outcome: Met This Shift     Problem: Pain:  Goal: Pain level will decrease  Description: Pain level will decrease  10/12/2020 0639 by Marcia Thomas RN  Outcome: Not Met This Shift    Goal: Control of acute pain  Description: Control of acute pain  10/12/2020 0639 by Marcia Thomas RN  Outcome: Not Met This Shift    Goal: Control of chronic pain  Description: Control of chronic pain  10/12/2020 0639 by Marcia Thomas RN  Outcome: Not Met This Shift     Though at beginning of shift patient had stated her pain felt as if it was slightly improving, patient was up all night complaining of unrelenting pain.  1X dose morphine 2m given IV

## 2020-10-12 NOTE — PROGRESS NOTES
Patient c/o severe pain in back unrelieved by current Norco order. Dr. Riaz Lester contacted and OK'd a one time dose of morphine 2 mg IV. Patient stated that she has taken morphine before and that it did not work for very long. I explained to patient to give this a try and we can reassess in a few hours.

## 2020-10-12 NOTE — CONSULTS
Department of Orthopedic Surgery  Resident Consult Note          Reason for Consult:  Left hand pain     HISTORY OF PRESENT ILLNESS:       Patient is a 31 y.o. female who presents with left hand pain after a fall while inpatient today. Patient was found on the floor upset earlier today, stating she had fallen and hurt her left knee and hand while coming out of the bathroom. She states she fell because she was out of breath. Imaging was performed and orthopedics was consulted for further evaluation and management. Currently patient is admitting to pain in her left hand and knee. She denies numbness/tingling/paresthesias. Denies any other orthopedic complaints at this time.       Past Medical History:        Diagnosis Date    Acute congestive heart failure ([facility]     BC (acute kidney injury) ([facility] 10/1/2019    Cephalgia 10/9/2019    Chronic kidney disease     Depression     Diabetes mellitus ([address]     Diabetic ketoacidosis ([facility] [address] [facility] 2011    Hemodialysis patient ([facility]     History of blood transfusion 2019    Hyperlipidemia 10/8/2020    Hypothyroidism 10/8/2020    Iron deficiency anemia 10/1/2019    MDRO (multiple drug resistant organisms) resistance     MRSA (methicillin resistant Staphylococcus aureus)     back wound abcess    Non compliance w medication regimen 3/30/2016    Other disorders of kidney and ureter     Pregnancy 3/30/2016    16 weeks    Severe pre-eclampsia in third trimester 2016     Past Surgical History:        Procedure Laterality Date    BACK SURGERY      abscess     SECTION      x2    CHOLECYSTECTOMY, LAPAROSCOPIC N/A 2019    CHOLECYSTECTOMY LAPAROSCOPIC performed by Loida Simon MD at [address] N/A 2018    COLONOSCOPY WITH BIOPSY performed by Rosa Peacock MD at [address] [facility] COLONOSCOPY N/A 2018    COLONOSCOPY WITH BIOPSY performed by Julieta Iverson MD at [address]  2012    EF extended release capsule 240 mg, 240 mg, Oral, Daily  gabapentin (NEURONTIN) capsule 100 mg, 100 mg, Oral, TID  levothyroxine (SYNTHROID) tablet 50 mcg, 50 mcg, Oral, Daily  lisinopril (PRINIVIL;ZESTRIL) tablet 20 mg, 20 mg, Oral, Daily  LORazepam (ATIVAN) tablet 0.5 mg, 0.5 mg, Oral, BID PRN  metOLazone (ZAROXOLYN) tablet 5 mg, 5 mg, Oral, Daily  metoprolol tartrate (LOPRESSOR) tablet 100 mg, 100 mg, Oral, BID  sodium chloride flush 0.9 % injection 10 mL, 10 mL, Intravenous, 2 times per day  sodium chloride flush 0.9 % injection 10 mL, 10 mL, Intravenous, PRN  acetaminophen (TYLENOL) tablet 650 mg, 650 mg, Oral, Q6H PRN **OR** acetaminophen (TYLENOL) suppository 650 mg, 650 mg, Rectal, Q6H PRN  magnesium hydroxide (MILK OF MAGNESIA) 400 MG/5ML suspension 30 mL, 30 mL, Oral, Daily PRN  promethazine (PHENERGAN) tablet 12.5 mg, 12.5 mg, Oral, Q6H PRN **OR** ondansetron (ZOFRAN) injection 4 mg, 4 mg, Intravenous, Q6H PRN  heparin (porcine) injection 5,000 Units, 5,000 Units, Subcutaneous, 3 times per day  glucose (GLUTOSE) 40 % oral gel 15 g, 15 g, Oral, PRN  dextrose 50 % IV solution, 12.5 g, Intravenous, PRN  glucagon (rDNA) injection 1 mg, 1 mg, Intramuscular, PRN  dextrose 5 % solution, 100 mL/hr, Intravenous, PRN  Allergies: Toradol [ketorolac tromethamine]    Social History:   TOBACCO:   reports that she has been smoking cigarettes. She has a 3.00 pack-year smoking history. She uses smokeless tobacco.  ETOH:   reports no history of alcohol use. DRUGS:   reports no history of drug use.   ACTIVITIES OF DAILY LIVING:    OCCUPATION:    Family History:       Problem Relation Age of Onset   [employer] Asthma Mother     Hypertension Mother     High Blood Pressure Mother     Diabetes Mother     Asthma Brother     High Blood Pressure Father        REVIEW OF SYSTEMS:  CONSTITUTIONAL:  negative for  fevers, chills  EYES:  negative for blurred vision, visual disturbance  HEENT:  negative for  hearing loss, voice change  RESPIRATORY:  negative for  dyspnea, wheezing  CARDIOVASCULAR:  negative for  chest pain, palpitations  GASTROINTESTINAL:  negative for nausea, vomiting  GENITOURINARY:  negative for frequency, urinary incontinence  HEMATOLOGIC/LYMPHATIC:  negative for bleeding and petechiae  MUSCULOSKELETAL:  positive for left hand and knee pain  NEUROLOGICAL:  negative for headaches, dizziness  BEHAVIOR/PSYCH:  negative for increased agitation and anxiety    PHYSICAL EXAM:    VITALS:  /63   Pulse 83   Temp 98 °F (36.7 °C) (Oral)   Resp 16   Ht 5' 4\" (1.626 m)   Wt 126 lb 12.2 oz (57.5 kg)   SpO2 99%   BMI 21.76 kg/m²   CONSTITUTIONAL:  awake, alert, cooperative, no apparent distress, and appears stated age  MUSCULOSKELETAL:  Left upper Extremity:  Skin intact circumferentially  Mild edema and bruising over the dorsum of the left small finger  +TTP about the dorsum of the left small finger around the metacarpal shaft  Compartments soft and compressible  No crossover sign appreciated with digits  Patient able to make composite fist  Mild pain with flexion/extension of the fifth digit  +AIN/PIN/Ulnar nerve function intact grossly  +Radial pulse, Brisk Cap refill, hand warm and perfused  Sensation intact to touch in radial/ulnar/median nerve distributions to hand      Left lower extremity:  · Mild effusion noted diffusely within the left knee  · Bruising over the lateral aspect of the left knee  · Patient able to flex and extend the left knee with only mild discomfort  · Tenderness to palpation of the lateral aspect of the left knee  · Patient able to actively straight leg raise  · No appreciable medial or lateral laxity upon stressing the knee in extension and 30 degrees of flexion  · Compartments soft and compressible  · +PF/DF/EHL  · +2/4 DP & PT pulses, Brisk Cap refill, Toes warm and perfused  · Distal sensation grossly intact to Peroneals, Sural, Saphenous, and tibial nrs            DATA:    CBC:   Lab Results   Component Value Date    WBC 7.7 10/11/2020    RBC 3.45 10/11/2020    HGB 10.4 10/11/2020    HCT 33.3 10/11/2020    MCV 96.5 10/11/2020    MCH 30.1 10/11/2020    MCHC 31.2 10/11/2020    RDW 17.6 10/11/2020     10/11/2020    MPV 9.5 10/11/2020     PT/INR:    Lab Results   Component Value Date    PROTIME 11.7 08/10/2020    PROTIME 13.6 08/14/2011    INR 1.0 08/10/2020       Radiology Review:  XR left hand: No appreciable fractures or dislocations noted    XR left knee: No fractures or dislocations noted    IMPRESSION:  · Left small finger contusion  · Left knee effusion    PLAN:  · Ice and elevation as needed to affected extremities  · Pain control per primary  · Patient placed in temporary ulnar gutter splint for comfort and immobilization  · PT/OT as needed   · Weightbearing as tolerated left lower extremity  · Avoid weightbearing temporarily through affected left 5th digit  · No acute orthopedic intervention at this time  · Patient to follow up with Dr. Dusty Torres in office  · Discuss with Dr. Dusty Torres

## 2020-10-12 NOTE — PROGRESS NOTES
[address] [place], [place] [address]ist   Progress Note    Admitting Date and Time: 10/8/2020  3:15 PM  Admit Dx: Type I diabetes mellitus with hyperosmolar coma ([facility] [E10.69, E10.65]    Subjective:    Seen and examined  Reports generalized malaise and bodywide pain  Also reports nausea, no vomiting    1247pm: staff report patient had self reported she fell on her way back to bed from the bathroom. Bed alarm activated and fall precautions put into place. Pt reported left knee pain. ROS: denies CP, SOB, fever, chills, HAS. All systems reviewed and negative except as documented.             vancomycin (VANCOCIN) intermittent dosing (placeholder)   Other RX Placeholder    insulin lispro  0-6 Units Subcutaneous TID WC    insulin lispro  0-3 Units Subcutaneous Nightly    epoetin nena-epbx  8,000 Units Subcutaneous Once per day on Mon Wed Fri    insulin glargine  10 Units Subcutaneous QAM    [Held by provider] insulin NPH  5 Units Subcutaneous Nightly    insulin lispro  3 Units Subcutaneous TID WC    sodium chloride flush  10 mL Intravenous 2 times per day    atorvastatin  40 mg Oral Nightly    bumetanide  2 mg Oral BID    dilTIAZem  240 mg Oral Daily    gabapentin  100 mg Oral TID    levothyroxine  50 mcg Oral Daily    lisinopril  20 mg Oral Daily    metOLazone  5 mg Oral Daily    metoprolol  100 mg Oral BID    sodium chloride flush  10 mL Intravenous 2 times per day    heparin (porcine)  5,000 Units Subcutaneous 3 times per day     HYDROcodone 5 mg - acetaminophen, 1 tablet, Q4H PRN    Or  HYDROcodone 5 mg - acetaminophen, 2 tablet, Q4H PRN  sodium chloride flush, 10 mL, PRN  acetaminophen, 650 mg, Q4H PRN  cloNIDine, 0.1 mg, BID PRN  LORazepam, 0.5 mg, BID PRN  sodium chloride flush, 10 mL, PRN  acetaminophen, 650 mg, Q6H PRN    Or  acetaminophen, 650 mg, Q6H PRN  magnesium hydroxide, 30 mL, Daily PRN  promethazine, 12.5 mg, Q6H PRN    Or  ondansetron, 4 mg, Q6H PRN  glucose, 15 g, Proteus by PCR  Not Detected        Streptococcus agalactiae by PCR  Not Detected        Staphylococcus aureus by PCR  Not Detected        Serratia marcescens by PCR  Not Detected        Streptococcus pneumoniae by PCR  Not Detected        Streptococcus pyogenes  by PCR  Not Detected        Acinetobacter baumannii by PCR  Not Detected        Candida albicans by PCR  Not Detected        Candida glabrata by PCR  Not Detected        Candida krusei by PCR  Not Detected        Candida parapsilosis by PCR  Not Detected        Candida tropicalis by PCR  Not Detected        Enterobacteriaceae by PCR  Not Detected        Enterococcus by PCR  Not Detected        Haemophilus Influenzae by PCR  Not Detected        Listeria monocytogenes by PCR  Not Detected        Neisseria meningitidis by PCR  Not Detected        Pseudomonas aeruginosa by PCR  Not Detected        Staphylococcus by PCR  DETECTED        Streptococcus by PCR  Not Detected        Methicillin Resistant by PCR  DETECTED       Narrative:         CALL  Walker  [facility] tel. ,   Microbiology results called to and read back by The Mckay King rn, 10/10/2020   12:28, by Hermelinda Fuel         Culture, Blood 2 [[id number]] (Abnormal)  Collected: 10/08/20 1835       Specimen: Blood  Updated: 10/11/20 0746        Culture, Blood 2  --        Gram stain performed from blood culture bottle media   Gram positive cocci in clusters           Organism  Staphylococcus species        Culture, Blood 2  Identification and sensitivity to follow       Narrative:         CALL  Walker  [facility] tel. ,   Previous panic on this admission - call not needed per SOP, 10/10/2020 12:34,   by Hermelinda Fuel       Culture, Blood 1 [[id number]] (Abnormal)  Collected: 10/08/20 1820       Specimen: Blood  Updated: 10/11/20 0740        Blood Culture, Routine  --        Gram stain performed from blood culture bottle media   Gram positive cocci in clusters           Organism  Staphylococcus species        Blood Culture, Routine Identification and sensitivity to follow       Narrative:         CALL  Walker  [facility] tel. ,   Microbiology results called to and read back by The Mckay King rn, 10/10/2020   12:34, by Jerad Tracey         Culture, Urine [[id number]] (Abnormal)   Collected: 10/08/20 1526       Specimen: Urine, clean catch  Updated: 10/10/20 0712        Organism  Enterococcus faecalis        Urine Culture, Routine  --        50,000 CFU/ml   Identification and sensitivity to follow       Narrative:         Source: URINE       Site:        Radiology:   XR CHEST PORTABLE   Final Result   No evidence of pneumonia or pleural effusion. XR KNEE LEFT (MIN 4 VIEWS)    (Results Pending)   XR HAND LEFT (2 VIEWS)    (Results Pending)       Assessment:  Principal Problem:    Uncontrolled type 1 diabetes mellitus with hyperglycemia, with long-term current use of insulin (HCC)  Active Problems:    Hypertension    Severe protein-calorie malnutrition ([address]    ESRD on peritoneal dialysis ([facility]    Hypothyroidism    Hyperlipidemia    Acute cystitis without hematuria  Resolved Problems:    * No resolved hospital problems. *      Plan:    1. Uncontrolled type I DM with hyperglycemia/hypoglycemia:  Continue Lantus 10 units in AM, NPH 5 units at night and Novolog 3 units with meals plus low dose insulin slide scale. 2. UTI: urine culture with Enterococcus faecalis. Given one time dose Fosfomycin 10/9.  3. Staph bacteremia: Continue  Vancomycin to be adjusted for PD. Pharm D consulted. Seen by ID. 4. Low back pain-heating pad and Norco prn.  5. ESRD on PD--gets nightly PD treatment. Seen by nephrology.    6. Left knee pain: s/p fall 10/12, for xray knee         Electronically signed by HEBER Meredith CNP on 10/12/2020 at 1:02 PM              \

## 2020-10-12 NOTE — PROGRESS NOTES
Answered pt call light to find her in bed, tearful and stating she fell on her way back to bed from the bathroom. Pt stated she got short of breath and light headed. Ron Nikolas onto her left side. Pt experiencing pain in her left knee and left hand. Vital signs are stable. Bed alarm was turned on and fall precautions put in place. Pt states this is the first time she has ever fallen. Pt independent until this time.

## 2020-10-12 NOTE — PROGRESS NOTES
Patient given PM Ativan and Norco at this time, 1 unit humalog for BS of 176. Dialysis RN here for evening treatment. VSS.

## 2020-10-12 NOTE — PROGRESS NOTES
Department of Internal Medicine  Nephrology Attending Progress Note    Events reviewed. SUBJECTIVE:  We are following [address] [place] [address] for ESRD on peritoneal dialysis. Reports no complaints today. Physical Exam:    VITALS:  /60   Pulse 95   Temp 98.1 °F (36.7 °C) (Oral)   Resp 16   Ht 5' 4\" (1.626 m)   Wt 126 lb 12.2 oz (57.5 kg)   SpO2 99%   BMI 21.76 kg/m²   24HR INTAKE/OUTPUT:  No intake or output data in the 24 hours ending 10/12/20 2031  Constitutional:  Alert and oriented, in no distress  HEENT:  Normocephalic, PERRL  Respiratory:  CTA bilaterally  Cardiovascular/Edema:  RRR, S1/S2  Gastrointestinal:  Soft, PD catheter exit site clean   Neurologic:  Nonfocal, AVELAR  Skin:  Warm, dry, no lesions  Other:  No edema    Scheduled Meds:   vancomycin (VANCOCIN) intermittent dosing (placeholder)   Other RX Placeholder    insulin lispro  0-6 Units Subcutaneous TID WC    insulin lispro  0-3 Units Subcutaneous Nightly    epoetin nena-epbx  8,000 Units Subcutaneous Once per day on Mon Wed Fri    insulin glargine  10 Units Subcutaneous QAM    [Held by provider] insulin NPH  5 Units Subcutaneous Nightly    insulin lispro  3 Units Subcutaneous TID WC    sodium chloride flush  10 mL Intravenous 2 times per day    atorvastatin  40 mg Oral Nightly    bumetanide  2 mg Oral BID    dilTIAZem  240 mg Oral Daily    gabapentin  100 mg Oral TID    levothyroxine  50 mcg Oral Daily    lisinopril  20 mg Oral Daily    metOLazone  5 mg Oral Daily    metoprolol  100 mg Oral BID    sodium chloride flush  10 mL Intravenous 2 times per day    heparin (porcine)  5,000 Units Subcutaneous 3 times per day     Continuous Infusions:   dextrose       PRN Meds:. HYDROcodone 5 mg - acetaminophen **OR** HYDROcodone 5 mg - acetaminophen, sodium chloride flush, acetaminophen, cloNIDine, LORazepam, sodium chloride flush, acetaminophen **OR** acetaminophen, magnesium hydroxide, promethazine **OR** ondansetron, glucose, dextrose, glucagon (rDNA), dextrose      DATA:    CBC:   Lab Results   Component Value Date    WBC 7.7 10/11/2020    RBC 3.45 10/11/2020    HGB 10.4 10/11/2020    HCT 33.3 10/11/2020    MCV 96.5 10/11/2020    MCH 30.1 10/11/2020    MCHC 31.2 10/11/2020    RDW 17.6 10/11/2020     10/11/2020    MPV 9.5 10/11/2020     CMP:    Lab Results   Component Value Date     10/11/2020    K 3.7 10/11/2020     10/11/2020    CO2 25 10/11/2020    BUN 38 10/11/2020    CREATININE 6.8 10/11/2020    GFRAA 9 10/11/2020    LABGLOM 9 10/11/2020    GLUCOSE 23 10/11/2020    GLUCOSE 130 05/18/2012    PROT 5.8 10/11/2020    LABALBU 3.4 10/11/2020    LABALBU 4.1 05/18/2012    CALCIUM 8.6 10/11/2020    BILITOT <0.2 10/11/2020    ALKPHOS 91 10/11/2020    AST 16 10/11/2020    ALT 13 10/11/2020     Magnesium:    Lab Results   Component Value Date    MG 2.2 10/10/2020     Phosphorus:    Lab Results   Component Value Date    PHOS 6.1 10/07/2020     Radiology Review:      Chest x-ray October 8, 2020   No evidence of pneumonia or pleural effusion.               BRIEF SUMMARY OF INITIAL CONSULT:    Briefly, Kimberly Mendes is a 31year-old female with history of ESRD on peritoneal dialysis (CCPD), poorly controlled type I DM with multiple admissions for DKA, gastroparesis, HTN  UTI, who was admitted October 8, 2020 after she presented to the ER reporting feeling unwell, having lower back pain and vomiting. In the ER her glucose was 570 mg/dL. Her urine showed >20 WBCs. IMPRESSION/RECOMMENDATIONS:      1. ESRD on peritoneal dialysis/CCPD. To continue same prescription. 2. Coagulase-negative Staphylococcus bacteremia, probably source [employer], on vancomycin  3. E faecalis bacteriuria  4. HTN, on metoprolol and diltiazem  5. Type I DM, glucose levels improved  6. Left fifth metacarpal fracture, status post fall  7. Left knee effusion, traumatic  8.  Anemia of CKD, on epoetin alpha 8,000 units tiw    Plan:    · Continue Bumex 2 mg twice daily  · Continue metolazone 5 mg daily  · Continue diltiazem 240 mg daily  · Continue lisinopril 20 mg daily  · Continue metoprolol 100 mg twice a day

## 2020-10-13 ENCOUNTER — APPOINTMENT (OUTPATIENT)
Dept: ULTRASOUND IMAGING | Age: 30
DRG: 721 | End: 2020-10-13
Payer: COMMERCIAL

## 2020-10-13 ENCOUNTER — APPOINTMENT (OUTPATIENT)
Dept: GENERAL RADIOLOGY | Age: 30
DRG: 721 | End: 2020-10-13
Payer: COMMERCIAL

## 2020-10-13 ENCOUNTER — APPOINTMENT (OUTPATIENT)
Dept: CT IMAGING | Age: 30
DRG: 721 | End: 2020-10-13
Payer: COMMERCIAL

## 2020-10-13 LAB
ANION GAP SERPL CALCULATED.3IONS-SCNC: 12 MMOL/L (ref 7–16)
B.E.: -5.6 MMOL/L (ref -3–3)
BUN BLDV-MCNC: 58 MG/DL (ref 6–20)
CALCIUM SERPL-MCNC: 8.5 MG/DL (ref 8.6–10.2)
CHLORIDE BLD-SCNC: 102 MMOL/L (ref 98–107)
CO2: 22 MMOL/L (ref 22–29)
COHB: 0.3 % (ref 0–1.5)
CREAT SERPL-MCNC: 7.9 MG/DL (ref 0.5–1)
CRITICAL: ABNORMAL
DATE ANALYZED: ABNORMAL
DATE OF COLLECTION: ABNORMAL
GFR AFRICAN AMERICAN: 7
GFR NON-AFRICAN AMERICAN: 7 ML/MIN/1.73
GLUCOSE BLD-MCNC: 290 MG/DL (ref 74–99)
HCO3: 20.7 MMOL/L (ref 22–26)
HCT VFR BLD CALC: 30.7 % (ref 34–48)
HEMOGLOBIN: 9.5 G/DL (ref 11.5–15.5)
HHB: 5.4 % (ref 0–5)
LAB: ABNORMAL
LACTIC ACID: 0.7 MMOL/L (ref 0.5–2.2)
Lab: ABNORMAL
MCH RBC QN AUTO: 30.5 PG (ref 26–35)
MCHC RBC AUTO-ENTMCNC: 30.9 % (ref 32–34.5)
MCV RBC AUTO: 98.7 FL (ref 80–99.9)
METER GLUCOSE: 111 MG/DL (ref 74–99)
METER GLUCOSE: 186 MG/DL (ref 74–99)
METER GLUCOSE: 210 MG/DL (ref 74–99)
METER GLUCOSE: 222 MG/DL (ref 74–99)
METER GLUCOSE: 225 MG/DL (ref 74–99)
METER GLUCOSE: 343 MG/DL (ref 74–99)
METER GLUCOSE: 349 MG/DL (ref 74–99)
METER GLUCOSE: 72 MG/DL (ref 74–99)
METER GLUCOSE: 96 MG/DL (ref 74–99)
METER GLUCOSE: <40 MG/DL (ref 74–99)
METHB: 0 % (ref 0–1.5)
MODE: ABNORMAL
O2 CONTENT: 13.1 ML/DL
O2 SATURATION: 94.6 % (ref 92–98.5)
O2HB: 94.3 % (ref 94–97)
OPERATOR ID: ABNORMAL
PATIENT TEMP: 37 C
PCO2: 43.9 MMHG (ref 35–45)
PDW BLD-RTO: 19.9 FL (ref 11.5–15)
PH BLOOD GAS: 7.29 (ref 7.35–7.45)
PLATELET # BLD: 246 E9/L (ref 130–450)
PMV BLD AUTO: 10.3 FL (ref 7–12)
PO2: 87.3 MMHG (ref 75–100)
POTASSIUM SERPL-SCNC: 5.1 MMOL/L (ref 3.5–5)
RBC # BLD: 3.11 E12/L (ref 3.5–5.5)
SEDIMENTATION RATE, ERYTHROCYTE: 19 MM/HR (ref 0–20)
SODIUM BLD-SCNC: 136 MMOL/L (ref 132–146)
SOURCE, BLOOD GAS: ABNORMAL
THB: 9.8 G/DL (ref 11.5–16.5)
TIME ANALYZED: 1056
VANCOMYCIN RANDOM: 14.1 MCG/ML (ref 5–40)
WBC # BLD: 6.8 E9/L (ref 4.5–11.5)

## 2020-10-13 PROCEDURE — 6370000000 HC RX 637 (ALT 250 FOR IP): Performed by: INTERNAL MEDICINE

## 2020-10-13 PROCEDURE — 36410 VNPNXR 3YR/> PHY/QHP DX/THER: CPT

## 2020-10-13 PROCEDURE — 6360000002 HC RX W HCPCS: Performed by: INTERNAL MEDICINE

## 2020-10-13 PROCEDURE — 36415 COLL VENOUS BLD VENIPUNCTURE: CPT

## 2020-10-13 PROCEDURE — 82805 BLOOD GASES W/O2 SATURATION: CPT

## 2020-10-13 PROCEDURE — 87040 BLOOD CULTURE FOR BACTERIA: CPT

## 2020-10-13 PROCEDURE — 71045 X-RAY EXAM CHEST 1 VIEW: CPT

## 2020-10-13 PROCEDURE — 90945 DIALYSIS ONE EVALUATION: CPT

## 2020-10-13 PROCEDURE — 85027 COMPLETE CBC AUTOMATED: CPT

## 2020-10-13 PROCEDURE — 87081 CULTURE SCREEN ONLY: CPT

## 2020-10-13 PROCEDURE — 80048 BASIC METABOLIC PNL TOTAL CA: CPT

## 2020-10-13 PROCEDURE — 2580000003 HC RX 258: Performed by: INTERNAL MEDICINE

## 2020-10-13 PROCEDURE — 99233 SBSQ HOSP IP/OBS HIGH 50: CPT | Performed by: INTERNAL MEDICINE

## 2020-10-13 PROCEDURE — 2000000000 HC ICU R&B

## 2020-10-13 PROCEDURE — 87205 SMEAR GRAM STAIN: CPT

## 2020-10-13 PROCEDURE — 6360000002 HC RX W HCPCS: Performed by: NURSE PRACTITIONER

## 2020-10-13 PROCEDURE — 87324 CLOSTRIDIUM AG IA: CPT

## 2020-10-13 PROCEDURE — 36600 WITHDRAWAL OF ARTERIAL BLOOD: CPT

## 2020-10-13 PROCEDURE — 93970 EXTREMITY STUDY: CPT

## 2020-10-13 PROCEDURE — 2500000003 HC RX 250 WO HCPCS: Performed by: NURSE PRACTITIONER

## 2020-10-13 PROCEDURE — 83605 ASSAY OF LACTIC ACID: CPT

## 2020-10-13 PROCEDURE — 70450 CT HEAD/BRAIN W/O DYE: CPT

## 2020-10-13 PROCEDURE — APPSS45 APP SPLIT SHARED TIME 31-45 MINUTES: Performed by: NURSE PRACTITIONER

## 2020-10-13 PROCEDURE — 85651 RBC SED RATE NONAUTOMATED: CPT

## 2020-10-13 PROCEDURE — 80202 ASSAY OF VANCOMYCIN: CPT

## 2020-10-13 PROCEDURE — 87070 CULTURE OTHR SPECIMN AEROBIC: CPT

## 2020-10-13 PROCEDURE — C1751 CATH, INF, PER/CENT/MIDLINE: HCPCS

## 2020-10-13 PROCEDURE — 76937 US GUIDE VASCULAR ACCESS: CPT

## 2020-10-13 PROCEDURE — 82962 GLUCOSE BLOOD TEST: CPT

## 2020-10-13 PROCEDURE — 87449 NOS EACH ORGANISM AG IA: CPT

## 2020-10-13 PROCEDURE — 05H933Z INSERTION OF INFUSION DEVICE INTO RIGHT BRACHIAL VEIN, PERCUTANEOUS APPROACH: ICD-10-PCS | Performed by: INTERNAL MEDICINE

## 2020-10-13 RX ORDER — DIPHENOXYLATE HCL/ATROPINE 2.5-.025/5
5 LIQUID (ML) ORAL ONCE
Status: DISCONTINUED | OUTPATIENT
Start: 2020-10-13 | End: 2020-10-13 | Stop reason: CLARIF

## 2020-10-13 RX ORDER — 0.9 % SODIUM CHLORIDE 0.9 %
1000 INTRAVENOUS SOLUTION INTRAVENOUS ONCE
Status: DISCONTINUED | OUTPATIENT
Start: 2020-10-13 | End: 2020-10-24 | Stop reason: HOSPADM

## 2020-10-13 RX ORDER — HEPARIN SODIUM (PORCINE) LOCK FLUSH IV SOLN 100 UNIT/ML 100 UNIT/ML
3 SOLUTION INTRAVENOUS EVERY 12 HOURS SCHEDULED
Status: DISCONTINUED | OUTPATIENT
Start: 2020-10-13 | End: 2020-10-24 | Stop reason: HOSPADM

## 2020-10-13 RX ORDER — DIPHENOXYLATE HYDROCHLORIDE AND ATROPINE SULFATE 2.5; .025 MG/1; MG/1
1 TABLET ORAL ONCE
Status: COMPLETED | OUTPATIENT
Start: 2020-10-13 | End: 2020-10-13

## 2020-10-13 RX ORDER — DEXTROSE MONOHYDRATE 100 MG/ML
INJECTION, SOLUTION INTRAVENOUS CONTINUOUS
Status: DISCONTINUED | OUTPATIENT
Start: 2020-10-13 | End: 2020-10-14

## 2020-10-13 RX ORDER — SODIUM CHLORIDE 0.9 % (FLUSH) 0.9 %
10 SYRINGE (ML) INJECTION PRN
Status: DISCONTINUED | OUTPATIENT
Start: 2020-10-13 | End: 2020-10-24 | Stop reason: HOSPADM

## 2020-10-13 RX ORDER — HEPARIN SODIUM (PORCINE) LOCK FLUSH IV SOLN 100 UNIT/ML 100 UNIT/ML
3 SOLUTION INTRAVENOUS PRN
Status: DISCONTINUED | OUTPATIENT
Start: 2020-10-13 | End: 2020-10-24 | Stop reason: HOSPADM

## 2020-10-13 RX ORDER — HEPARIN SODIUM (PORCINE) LOCK FLUSH IV SOLN 100 UNIT/ML 100 UNIT/ML
1 SOLUTION INTRAVENOUS PRN
Status: DISCONTINUED | OUTPATIENT
Start: 2020-10-13 | End: 2020-10-24 | Stop reason: HOSPADM

## 2020-10-13 RX ORDER — LIDOCAINE HYDROCHLORIDE 10 MG/ML
5 INJECTION, SOLUTION INFILTRATION; PERINEURAL ONCE
Status: DISCONTINUED | OUTPATIENT
Start: 2020-10-13 | End: 2020-10-24 | Stop reason: HOSPADM

## 2020-10-13 RX ORDER — HEPARIN SODIUM (PORCINE) LOCK FLUSH IV SOLN 100 UNIT/ML 100 UNIT/ML
1 SOLUTION INTRAVENOUS EVERY 12 HOURS SCHEDULED
Status: DISCONTINUED | OUTPATIENT
Start: 2020-10-13 | End: 2020-10-24 | Stop reason: HOSPADM

## 2020-10-13 RX ADMIN — DEXTROSE MONOHYDRATE 100 ML/HR: 50 INJECTION, SOLUTION INTRAVENOUS at 12:08

## 2020-10-13 RX ADMIN — DILTIAZEM HYDROCHLORIDE 240 MG: 240 CAPSULE, COATED, EXTENDED RELEASE ORAL at 08:32

## 2020-10-13 RX ADMIN — Medication 100 UNITS: at 23:03

## 2020-10-13 RX ADMIN — LEVOTHYROXINE SODIUM 50 MCG: 50 TABLET ORAL at 06:31

## 2020-10-13 RX ADMIN — INSULIN LISPRO 2 UNITS: 100 INJECTION, SOLUTION INTRAVENOUS; SUBCUTANEOUS at 18:35

## 2020-10-13 RX ADMIN — METOPROLOL TARTRATE 100 MG: 50 TABLET, FILM COATED ORAL at 22:54

## 2020-10-13 RX ADMIN — LISINOPRIL 20 MG: 20 TABLET ORAL at 08:33

## 2020-10-13 RX ADMIN — DEXTROSE MONOHYDRATE: 100 INJECTION, SOLUTION INTRAVENOUS at 13:04

## 2020-10-13 RX ADMIN — DEXTROSE MONOHYDRATE 12.5 G: 25 INJECTION, SOLUTION INTRAVENOUS at 10:16

## 2020-10-13 RX ADMIN — METOPROLOL TARTRATE 100 MG: 50 TABLET, FILM COATED ORAL at 08:32

## 2020-10-13 RX ADMIN — INSULIN LISPRO 3 UNITS: 100 INJECTION, SOLUTION INTRAVENOUS; SUBCUTANEOUS at 18:37

## 2020-10-13 RX ADMIN — INSULIN LISPRO 4 UNITS: 100 INJECTION, SOLUTION INTRAVENOUS; SUBCUTANEOUS at 06:25

## 2020-10-13 RX ADMIN — BUMETANIDE 2 MG: 1 TABLET ORAL at 08:32

## 2020-10-13 RX ADMIN — GABAPENTIN 100 MG: 100 CAPSULE ORAL at 22:54

## 2020-10-13 RX ADMIN — DIPHENOXYLATE HYDROCHLORIDE AND ATROPINE SULFATE 1 TABLET: 2.5; .025 TABLET ORAL at 19:41

## 2020-10-13 RX ADMIN — INSULIN GLARGINE 10 UNITS: 100 INJECTION, SOLUTION SUBCUTANEOUS at 08:34

## 2020-10-13 RX ADMIN — HYDROCODONE BITARTRATE AND ACETAMINOPHEN 2 TABLET: 5; 325 TABLET ORAL at 06:32

## 2020-10-13 RX ADMIN — VANCOMYCIN HYDROCHLORIDE 500 MG: 500 INJECTION, POWDER, LYOPHILIZED, FOR SOLUTION INTRAVENOUS at 17:03

## 2020-10-13 RX ADMIN — LORAZEPAM 0.5 MG: 0.5 TABLET ORAL at 19:41

## 2020-10-13 RX ADMIN — ALTEPLASE 1 MG: 2.2 INJECTION, POWDER, LYOPHILIZED, FOR SOLUTION INTRAVENOUS at 16:07

## 2020-10-13 RX ADMIN — ATORVASTATIN CALCIUM 40 MG: 40 TABLET, FILM COATED ORAL at 22:54

## 2020-10-13 RX ADMIN — INSULIN LISPRO 2 UNITS: 100 INJECTION, SOLUTION INTRAVENOUS; SUBCUTANEOUS at 22:56

## 2020-10-13 RX ADMIN — CEFEPIME HYDROCHLORIDE 500 MG: 1 INJECTION, POWDER, FOR SOLUTION INTRAMUSCULAR; INTRAVENOUS at 12:33

## 2020-10-13 RX ADMIN — METOLAZONE 5 MG: 2.5 TABLET ORAL at 08:32

## 2020-10-13 RX ADMIN — HEPARIN SODIUM 5000 UNITS: 5000 INJECTION INTRAVENOUS; SUBCUTANEOUS at 23:00

## 2020-10-13 RX ADMIN — GABAPENTIN 100 MG: 100 CAPSULE ORAL at 08:32

## 2020-10-13 RX ADMIN — DEXTROSE MONOHYDRATE 12.5 G: 25 INJECTION, SOLUTION INTRAVENOUS at 10:19

## 2020-10-13 RX ADMIN — INSULIN LISPRO 3 UNITS: 100 INJECTION, SOLUTION INTRAVENOUS; SUBCUTANEOUS at 08:34

## 2020-10-13 RX ADMIN — HEPARIN SODIUM 5000 UNITS: 5000 INJECTION INTRAVENOUS; SUBCUTANEOUS at 16:01

## 2020-10-13 RX ADMIN — GABAPENTIN 100 MG: 100 CAPSULE ORAL at 16:01

## 2020-10-13 RX ADMIN — HEPARIN SODIUM 5000 UNITS: 5000 INJECTION INTRAVENOUS; SUBCUTANEOUS at 06:24

## 2020-10-13 RX ADMIN — HYDROCODONE BITARTRATE AND ACETAMINOPHEN 2 TABLET: 5; 325 TABLET ORAL at 17:41

## 2020-10-13 ASSESSMENT — PAIN SCALES - GENERAL
PAINLEVEL_OUTOF10: 0
PAINLEVEL_OUTOF10: 7
PAINLEVEL_OUTOF10: 7
PAINLEVEL_OUTOF10: 5

## 2020-10-13 ASSESSMENT — PAIN DESCRIPTION - LOCATION: LOCATION: WRIST;KNEE

## 2020-10-13 NOTE — PROGRESS NOTES
[facility]  [facility]  Progress Note    NAME:Wilton Flores  1990  DATE OF SERVICE:10/13/20    Pt was seen FACE TO FACE FOR atbx    SUBJECTIVE:  Chief Complaint   Patient presents with    Hyperglycemia     blood sugar high today vomiting back pain   s/p fall left knee and hand  XRY   Question acute traumatic nondisplaced fracture involving the 5th    metacarpal neck     TRANSFERRED TO icu FOR ms CHANGES  HAS DIARRHEA  Afebrile   Patient is tolerating medications. No reported adverse drug reactions. Review of systems:  As stated above in the chief complaint, otherwise negative.     Medications:  Scheduled Meds:   alteplase  1 mg Intracatheter Once    cefepime  500 mg Intravenous Q48H    sodium chloride  1,000 mL Intravenous Once    sodium chloride  1,000 mL Intravenous Once    lidocaine  5 mL Intradermal Once    heparin flush  3 mL Intravenous 2 times per day    vancomycin (VANCOCIN) intermittent dosing (placeholder)   Other RX Placeholder    insulin lispro  0-6 Units Subcutaneous TID WC    insulin lispro  0-3 Units Subcutaneous Nightly    epoetin nena-epbx  8,000 Units Subcutaneous Once per day on Mon Wed Fri    insulin glargine  10 Units Subcutaneous QAM    [Held by provider] insulin NPH  5 Units Subcutaneous Nightly    insulin lispro  3 Units Subcutaneous TID WC    sodium chloride flush  10 mL Intravenous 2 times per day    atorvastatin  40 mg Oral Nightly    dilTIAZem  240 mg Oral Daily    gabapentin  100 mg Oral TID    levothyroxine  50 mcg Oral Daily    lisinopril  20 mg Oral Daily    metOLazone  5 mg Oral Daily    metoprolol  100 mg Oral BID    sodium chloride flush  10 mL Intravenous 2 times per day    heparin (porcine)  5,000 Units Subcutaneous 3 times per day     Continuous Infusions:   dextrose 50 mL/hr at 10/13/20 1304    dextrose Stopped (10/13/20 1304)     PRN Meds:sodium chloride flush, heparin flush, HYDROcodone 5 mg - (H) 11/01/2019    CRP 0.3 10/02/2019    CRP 0.4 09/28/2017     Lab Results   Component Value Date    SEDRATE 19 10/13/2020    SEDRATE 135 (H) 11/01/2019    SEDRATE 14 09/28/2017       Microbiology:   No results for input(s): COVID19 in the last 72 hours. Lab Results   Component Value Date    BLOODCULT2 5 Days no growth 07/18/2020    BLOODCULT2 5 Days- no growth 02/09/2020    BLOODCULT2 5 Days- no growth 02/08/2020    ORG Staphylococcus epidermidis 10/08/2020    ORG Staphylococcus epidermidis 10/08/2020    ORG Enterococcus faecalis 10/08/2020     WOUND/ABSCESS   Date Value Ref Range Status   12/11/2014 (A)  Final    Mixed yulisa also isolated, including:  Alpha hemolytic Strep species  Corynebacteria     12/11/2014 Heavy growth  Final     No results found for: RESPSMEAR  No results found for: MPNEUMO, CLAMYDCU, LABLEGI, AFBCX, FUNGSM, LABFUNG    MRSA Culture Only   Date Value Ref Range Status   10/30/2019 Methicillin resistant Staph aureus not isolated  Final        ASSESSMENT/PLAN:  [facility]     E. faecalis bacteruria   CKD PD CATH  S/p fall   -most likely line infection  -f/u blood cx  Esr/crp YULISSA  vancomycin (VANCOCIN) intermittent dosing (placeholder), RX Placeholder         · Monitor labs    Imaging and labs were reviewed per medical records. The patient was educated about the diagnosis, prognosis, indications, risks and benefits of treatment. An opportunity to ask questions was given to the patient/FAMILY. Thank you for involving me in the care of [address] [place] [address] I will continue to follow. Please do not hesitate to call for any questions or concerns.     Electronically signed by Kodak Sr MD on 10/13/2020 at 2:48 PM

## 2020-10-13 NOTE — PROGRESS NOTES
This RN attempted to place a tele-sitter in patient's room for patient safety. Patient had a fall earlier in the day today. Patient refused tele-sitter.   Electronically signed by Stephanie Horvath RN on 10/12/2020 at 11:00 PM

## 2020-10-13 NOTE — CONSULTS
Pulmonary/Critical Care Consult Note    CHIEF COMPLAINT: Hypoglycemia in a patient with type 1 diabetes mellitus and CKD on peritoneal dialysis, MRSA in the blood    HISTORY OF PRESENT ILLNESS: The patient is a 53-year-old female who was found diaphoretic and unconscious on the floor earlier today and was found to have a blood sugar of 30. It is not known for how long she was hypoglycemic but she was transferred to the intensive care unit and given D5W and then D10W with recovery of her blood sugars and her consciousness together. CT scan of the brain was negative and ultrasound legs were also negative. She has now awake alert and wishing to eat. The patient's peritoneal dialysis is carried out by both the patient herself and her mother who was trained and has a medical background. The patient has grown MRSA in her blood earlier in this admission and is being treated with vancomycin guided by the infectious disease service. There was concern for aspiration pneumonitis while the patient was hypoglycemic so we did a chest x-ray which does not show any significant infiltrates.     ALLERGY:  Toradol [ketorolac tromethamine]    FAMILY HISTORY:  Family History   Problem Relation Age of Onset   Teresa Granados Asthma Mother     Hypertension Mother     High Blood Pressure Mother     Diabetes Mother     Asthma Brother     High Blood Pressure Father        SOCIAL HISTORY:  Social History     Socioeconomic History    Marital status: Single     Spouse name: Not on file    Number of children: Not on file    Years of education: Not on file    Highest education level: Not on file   Occupational History    Not on file   Social Needs    Financial resource strain: Very hard    Food insecurity     Worry: Never true     Inability: Never true    Transportation needs     Medical: No     Non-medical: No   Tobacco Use    Smoking status: Current Every Day Smoker     Packs/day: 0.50     Years: 6.00     Pack years: 3.00     Types: Cigarettes    Smokeless tobacco: Current User   Substance and Sexual Activity    Alcohol use: No    Drug use: No     Comment: documented prior history of opioid abuse    Sexual activity: Yes     Partners: Male   Lifestyle    Physical activity     Days per week: Not on file     Minutes per session: Not on file    Stress: Not on file   Relationships    Social connections     Talks on phone: Not on file     Gets together: Not on file     Attends [employer] service: Not on file     Active member of club or organization: Not on file     Attends meetings of clubs or organizations: Not on file     Relationship status: Not on file    Intimate partner violence     Fear of current or ex partner: Not on file     Emotionally abused: Not on file     Physically abused: Not on file     Forced sexual activity: Not on file   Other Topics Concern    Not on file   Social History Narrative    Not on file       MEDICAL HISTORY:  Past Medical History:   Diagnosis Date    Acute congestive [facility] failure ([facility]     BC (acute kidney injury) ([facility] [facility] 10/1/2019    Cephalgia 10/9/2019    Chronic kidney disease     Depression     Diabetes mellitus ([facility]     Diabetic ketoacidosis ([facility] [address] [facility] 8/27/2011    Hemodialysis patient ([facility] [address] [facility]     History of blood transfusion 11/2019    Hyperlipidemia 10/8/2020    Hypothyroidism 10/8/2020    Iron deficiency anemia 10/1/2019    MDRO (multiple drug resistant organisms) resistance     MRSA (methicillin resistant Staphylococcus aureus)     back wound abcess    Non compliance w medication regimen 3/30/2016    Other disorders of kidney and ureter     Pregnancy 3/30/2016    16 weeks    Severe pre-eclampsia in third trimester 8/22/2016       MEDICATIONS:   cefepime  500 mg Intravenous Q48H    sodium chloride  1,000 mL Intravenous Once    sodium chloride  1,000 mL Intravenous Once    lidocaine  5 mL Intradermal Once    heparin flush  3 mL Intravenous 2 times per day    vancomycin  500 mg Intravenous Once    lidocaine  5 mL Intradermal Once    heparin flush  1 mL Intravenous 2 times per day    vancomycin (VANCOCIN) intermittent dosing (placeholder)   Other RX Placeholder    insulin lispro  0-6 Units Subcutaneous TID WC    insulin lispro  0-3 Units Subcutaneous Nightly    epoetin nena-epbx  8,000 Units Subcutaneous Once per day on Mon Wed Fri    insulin glargine  10 Units Subcutaneous QAM    [Held by provider] insulin NPH  5 Units Subcutaneous Nightly    insulin lispro  3 Units Subcutaneous TID     sodium chloride flush  10 mL Intravenous 2 times per day    atorvastatin  40 mg Oral Nightly    dilTIAZem  240 mg Oral Daily    gabapentin  100 mg Oral TID    levothyroxine  50 mcg Oral Daily    lisinopril  20 mg Oral Daily    metOLazone  5 mg Oral Daily    metoprolol  100 mg Oral BID    sodium chloride flush  10 mL Intravenous 2 times per day    heparin (porcine)  5,000 Units Subcutaneous 3 times per day      dextrose Stopped (10/13/20 1717)    dextrose Stopped (10/13/20 1304)     sodium chloride flush, heparin flush, sodium chloride flush, heparin flush, HYDROcodone 5 mg - acetaminophen **OR** HYDROcodone 5 mg - acetaminophen, acetaminophen, cloNIDine, LORazepam, acetaminophen **OR** acetaminophen, magnesium hydroxide, promethazine **OR** ondansetron, glucose, dextrose, glucagon (rDNA), dextrose    REVIEW OF SYSTEMS:  Constitutional: Denies fever, weight loss, night sweats, and fatigue  Skin: Denies pigmentation, dark lesions, and rashes   HEENT: Denies hearing loss, tinnitus, ear drainage, epistaxis, sore throat, and hoarseness. Cardiovascular: Denies palpitations, chest pain, and chest pressure. Respiratory: Denies cough, dyspnea at rest, hemoptysis, apnea, and choking.   Gastrointestinal: Denies nausea, vomiting, poor appetite, diarrhea, heartburn or reflux  Genitourinary: Denies dysuria, frequency, urgency or hematuria  Musculoskeletal: Denies myalgias, muscle weakness, and bone pain  Neurological: Denies dizziness, vertigo, headache, and focal weakness  Psychological: Denies anxiety and depression  Endocrine: Denies heat intolerance and cold intolerance  Hematopoietic/Lymphatic: Denies bleeding problems and blood transfusions    PHYSICAL EXAM:  Vitals:    10/13/20 1703   BP: (!) 120/90   Pulse: 70   Resp: 21   Temp:    SpO2: 100%           O2 Device: None (Room air)    Constitutional: No fever, chills, diaphoresis currently. Skin: Skin rash, no skin breakdown  HEENT: Unremarkable  Neck: No JVD, lymphadenopathy, thyromegaly  Cardiovascular: S1, S2 regular. No S3 murmurs rubs present  Respiratory: Clear to auscultation bilaterally  Gastrointestinal: Soft, flat, nontender. Peritoneal dialysis catheter in place  Genitourinary: No CVA tenderness  Extremities: No clubbing, cyanosis, or edema  Neurological: Awake, alert, oriented x3.   Seems to be back to baseline according to her mother who was present at the time of my exam  Psychological: Somewhat flat affect    LABS:  WBC   Date Value Ref Range Status   10/13/2020 6.8 4.5 - 11.5 E9/L Final   10/11/2020 7.7 4.5 - 11.5 E9/L Final   10/10/2020 6.8 4.5 - 11.5 E9/L Final     Hemoglobin   Date Value Ref Range Status   10/13/2020 9.5 (L) 11.5 - 15.5 g/dL Final   10/11/2020 10.4 (L) 11.5 - 15.5 g/dL Final   10/10/2020 9.5 (L) 11.5 - 15.5 g/dL Final     Hematocrit   Date Value Ref Range Status   10/13/2020 30.7 (L) 34.0 - 48.0 % Final   10/11/2020 33.3 (L) 34.0 - 48.0 % Final   10/10/2020 30.1 (L) 34.0 - 48.0 % Final     MCV   Date Value Ref Range Status   10/13/2020 98.7 80.0 - 99.9 fL Final   10/11/2020 96.5 80.0 - 99.9 fL Final   10/10/2020 93.5 80.0 - 99.9 fL Final     Platelets   Date Value Ref Range Status   10/13/2020 246 130 - 450 E9/L Final   10/11/2020 245 130 - 450 E9/L Final   10/10/2020 220 130 - 450 E9/L Final     Sodium   Date Value Ref Range Status   10/13/2020 136 132 - 146 mmol/L Final   10/11/2020 140 132 - 146 mmol/L Final 10/10/2020 137 132 - 146 mmol/L Final     Potassium   Date Value Ref Range Status   10/13/2020 5.1 (H) 3.5 - 5.0 mmol/L Final     Potassium reflex Magnesium   Date Value Ref Range Status   10/11/2020 3.7 3.5 - 5.0 mmol/L Final   10/10/2020 3.3 (L) 3.5 - 5.0 mmol/L Final   10/09/2020 3.2 (L) 3.5 - 5.0 mmol/L Final     Chloride   Date Value Ref Range Status   10/13/2020 102 98 - 107 mmol/L Final   10/11/2020 104 98 - 107 mmol/L Final   10/10/2020 99 98 - 107 mmol/L Final     CO2   Date Value Ref Range Status   10/13/2020 22 22 - 29 mmol/L Final   10/11/2020 25 22 - 29 mmol/L Final   10/10/2020 24 22 - 29 mmol/L Final     BUN   Date Value Ref Range Status   10/13/2020 58 (H) 6 - 20 mg/dL Final   10/11/2020 38 (H) 6 - 20 mg/dL Final   10/10/2020 31 (H) 6 - 20 mg/dL Final     CREATININE   Date Value Ref Range Status   10/13/2020 7.9 (H) 0.5 - 1.0 mg/dL Final   10/11/2020 6.8 (H) 0.5 - 1.0 mg/dL Final   10/10/2020 6.2 (H) 0.5 - 1.0 mg/dL Final     Glucose   Date Value Ref Range Status   10/13/2020 290 (H) 74 - 99 mg/dL Final   10/11/2020 23 (LL) 74 - 99 mg/dL Final   10/10/2020 91 74 - 99 mg/dL Final   05/18/2012 130 (H) 70 - 110 mg/dL Final   04/26/2012 61 (L) 70 - 110 mg/dL Final   04/25/2012 196 (H) 70 - 110 mg/dL Final     Calcium   Date Value Ref Range Status   10/13/2020 8.5 (L) 8.6 - 10.2 mg/dL Final   10/11/2020 8.6 8.6 - 10.2 mg/dL Final   10/10/2020 8.5 (L) 8.6 - 10.2 mg/dL Final     Total Protein   Date Value Ref Range Status   10/11/2020 5.8 (L) 6.4 - 8.3 g/dL Final   10/10/2020 5.8 (L) 6.4 - 8.3 g/dL Final   10/09/2020 5.5 (L) 6.4 - 8.3 g/dL Final     Albumin   Date Value Ref Range Status   05/18/2012 4.1 3.2 - 4.8 g/dL Final   04/23/2012 4.0 3.2 - 4.8 g/dL Final   04/02/2012 4.8 3.2 - 4.8 g/dL Final     Alb   Date Value Ref Range Status   10/11/2020 3.4 (L) 3.5 - 5.2 g/dL Final   10/10/2020 3.6 3.5 - 5.2 g/dL Final   10/09/2020 2.8 (L) 3.5 - 5.2 g/dL Final     Total Bilirubin   Date Value Ref Range Status 10/11/2020 <0.2 0.0 - 1.2 mg/dL Final   10/10/2020 <0.2 0.0 - 1.2 mg/dL Final   10/09/2020 <0.2 0.0 - 1.2 mg/dL Final     Alkaline Phosphatase   Date Value Ref Range Status   10/11/2020 91 35 - 104 U/L Final   10/10/2020 88 35 - 104 U/L Final   10/09/2020 110 (H) 35 - 104 U/L Final     AST   Date Value Ref Range Status   10/11/2020 16 0 - 31 U/L Final   10/10/2020 19 0 - 31 U/L Final   10/09/2020 20 0 - 31 U/L Final     ALT   Date Value Ref Range Status   10/11/2020 13 0 - 32 U/L Final   10/10/2020 12 0 - 32 U/L Final   10/09/2020 16 0 - 32 U/L Final     GFR Non-   Date Value Ref Range Status   10/13/2020 7 >=60 mL/min/1.73 Final     Comment:     Chronic Kidney Disease: less than 60 ml/min/1.73 sq.m. Kidney Failure: less than 15 ml/min/1.73 sq.m. Results valid for patients 18 years and older. 10/11/2020 9 >=60 mL/min/1.73 Final     Comment:     Chronic Kidney Disease: less than 60 ml/min/1.73 sq.m. Kidney Failure: less than 15 ml/min/1.73 sq.m. Results valid for patients 18 years and older. 10/10/2020 10 >=60 mL/min/1.73 Final     Comment:     Chronic Kidney Disease: less than 60 ml/min/1.73 sq.m. Kidney Failure: less than 15 ml/min/1.73 sq.m. Results valid for patients 18 years and older.        GFR    Date Value Ref Range Status   10/13/2020 7  Final   10/11/2020 9  Final   10/10/2020 10  Final     Magnesium   Date Value Ref Range Status   10/10/2020 2.2 1.6 - 2.6 mg/dL Final   10/09/2020 2.3 1.6 - 2.6 mg/dL Final   08/10/2020 1.6 1.6 - 2.6 mg/dL Final     Phosphorus   Date Value Ref Range Status   10/07/2020 6.1 (H) 2.5 - 4.5 mg/dL Final   07/21/2020 5.0 (H) 2.5 - 4.5 mg/dL Final   07/20/2020 4.9 (H) 2.5 - 4.5 mg/dL Final     Recent Labs     10/13/20  1056   PH 7.291*   PO2 87.3   PCO2 43.9   HCO3 20.7*   BE -5.6*   O2SAT 94.6       RADIOLOGY:  US DUP LOWER EXTREMITIES BILATERAL VENOUS   Final Result   No evidence of DVT in either lower assessment. I devoted my full attention to the direct care of this patient for the amount of time indicated below. Time I spent with the family or surrogate(s) is included only if the patient was incapable of providing the necessary information or participating in medical decisions - Time devoted to teaching and to any procedures I billed separately is not included.     CRITICAL CARE TIME:  30 minutes    Electronically signed by Ever Grimm MD on 10/13/2020 at 5:54 PM

## 2020-10-13 NOTE — PROGRESS NOTES
Updated patients mother [facility] that patient was transfered to the ICU

## 2020-10-13 NOTE — CARE COORDINATION
10-13- Cm note: pt transferred to ICU , blood sugar reading low, pt having lethargy . CM/SS will follow for discharge needs throughout her stay here .  Electronically signed by Junior Arguello RN on 10/13/2020 at 12:08 PM

## 2020-10-13 NOTE — PROGRESS NOTES
Pharmacy Consultation Note  (Antibiotic Dosing and Monitoring)    Initial consult date: 10/10/20  Consulting physician: Dr. Mickey Bland  Drug(s): Vancomycin  Indication: Positive blood culture    Ht Readings from Last 1 Encounters:   10/10/20 5' 4\" (1.626 m)     Wt Readings from Last 1 Encounters:   10/13/20 127 lb 8 oz (57.8 kg)         Age/  Gender IBW DW  Allergy Information   31 y.o.     female 54.7 kg 58.1 kg  Toradol [ketorolac tromethamine]                 Date  WBC CCPD Drug/Dose Time   Given Level(s)   (Time) Comments   10/10  (#1) 6.8 QHS  Vancomycin 1,000 mg IV x 1 1822     10/11  (#2) 7.7 QHS No dose -- 20.0 mcg/mL @ 1324    10/12  (#3) -- QHS No dose -- 16.4 mcg/mL @ 1511    10/13  (#4) 6.8 QHS Vancomycin 500 mg IV once <1800> 14.1 mcg/mL @ 1548      (#5)           (#6)           (#7)           Estimated Creatinine Clearance: 9 mL/min (A) (based on SCr of 7.9 mg/dL (H)). UOP over the past 24 hours:       Intake/Output Summary (Last 24 hours) at 10/13/2020 1638  Last data filed at 10/13/2020 1233  Gross per 24 hour   Intake 50 ml   Output --   Net 50 ml       Temp max: Temp (24hrs), Av.3 °F (36.8 °C), Min:97 °F (36.1 °C), Max:98.7 °F (37.1 °C)      Antibiotic Regimen: No other antibiotics at this time  Antibiotic Dose Date Initiated   Cefepime 500 mg IV Q48H 10/13     Cultures:  available culture and sensitivity results were reviewed in EPIC  Cultures sent and are pending. Culture Date Result    Urine 10/8 E faecalis   Blood #1 10/8 Methicillin-resistant CoNS   Blood #2 10/8 Methicillin-resistant CoNS   Blood #3 10/12 In process     Assessment:  · Consulted by Dr. Mickey Bland to dose/monitor vancomycin  · Goal trough level:  15-20 mcg/mL  · Pt is a 31 yof admitted to the hospital for hyperglycemia. She is being initiated on vancomycin for a positive blood culture, final c/s pending.   · [place] is ESRD on PD, cycles nightly, nephro following  · 10/11: Random level @ 1324 = 20.0 mcg/mL    Plan:  · Vancomycin 500 mg IV once  · Repeat random level on 10/15  · Follow nephro notes/PD schedule  · Pharmacist will follow and monitor/adjust dosing as necessary      Thank you for the consult,    Mile Guillen, PharmD 10/13/2020 4:38 PM   [phone]

## 2020-10-13 NOTE — PROCEDURES
Power midline  Placement 10/13/2020    Product number: [id number]   Lot Number: [id number]      Ultrasound: YES   Anatomy; iv placement site: RIGHT BRACHIAL VEIN                Upper Arm Circumference: 24CM    Size: 4.5FR    Exposed Length: 2CM    Internal Length: 13CM   Cut: 0CM   Vein Measurement: 0.55CM    INSERTED POWER MIDLINE WITH BRISK BLOOD RETURN NOTED.      Richy Faulkner  10/13/2020  4:36 PM

## 2020-10-13 NOTE — PLAN OF CARE
Problem: Pain:  Goal: Control of chronic pain  Description: Control of chronic pain  10/13/2020 0945 by Ginger Severs, RN  Outcome: Met This Shift     Problem: Falls - Risk of:  Goal: Will remain free from falls  Description: Will remain free from falls  10/13/2020 0945 by Ginger Severs, RN  Outcome: Met This Shift     Problem: Falls - Risk of:  Goal: Absence of physical injury  Description: Absence of physical injury  10/13/2020 0945 by Ginger Severs, RN  Outcome: Met This Shift     Problem: Sensory Perception - Impaired:  Goal: Ability to maintain a stable neurologic state will improve  Description: Ability to maintain a stable neurologic state will improve  10/13/2020 0945 by Ginger Severs, RN  Outcome: Met This Shift     Problem: Pain:  Goal: Pain level will decrease  Description: Pain level will decrease  10/13/2020 0945 by Ginger Severs, RN  Outcome: Not Met This Shift  Note: Having c/o wrist and knee pain     Problem: Pain:  Goal: Control of acute pain  Description: Control of acute pain  10/13/2020 0945 by Ginger Severs, RN  Outcome: Not Met This Shift  Note: Having c/o wrist and knee pain     Problem: Serum Glucose Level - Abnormal:  Goal: Ability to maintain appropriate glucose levels will improve  Description: Ability to maintain appropriate glucose levels will improve  Outcome: Not Met This Shift  Note: Pt continues to be hyperglycemic

## 2020-10-13 NOTE — PROGRESS NOTES
Department of Internal Medicine  Nephrology Attending Progress Note    Events reviewed. SUBJECTIVE:  We are following [address] [place] [address] for ESRD on peritoneal dialysis. Reports no complaints today. Physical Exam:    VITALS:  BP (!) 104/51   Pulse 91   Temp 98.7 °F (37.1 °C)   Resp 15   Ht 5' 4\" (1.626 m)   Wt 127 lb 3.2 oz (57.7 kg)   SpO2 97%   BMI 21.83 kg/m²   24HR INTAKE/OUTPUT:  No intake or output data in the 24 hours ending 10/13/20 0855  Constitutional:  Alert and oriented, in no distress  HEENT:  Normocephalic, PERRL  Respiratory:  CTA bilaterally  Cardiovascular/Edema:  RRR, S1/S2  Gastrointestinal:  Soft, PD catheter exit site clean   Neurologic:  Nonfocal, AVELAR  Skin:  Warm, dry, no lesions  Other:  No edema    Scheduled Meds:   vancomycin (VANCOCIN) intermittent dosing (placeholder)   Other RX Placeholder    insulin lispro  0-6 Units Subcutaneous TID WC    insulin lispro  0-3 Units Subcutaneous Nightly    epoetin nena-epbx  8,000 Units Subcutaneous Once per day on Mon Wed Fri    insulin glargine  10 Units Subcutaneous QAM    [Held by provider] insulin NPH  5 Units Subcutaneous Nightly    insulin lispro  3 Units Subcutaneous TID WC    sodium chloride flush  10 mL Intravenous 2 times per day    atorvastatin  40 mg Oral Nightly    bumetanide  2 mg Oral BID    dilTIAZem  240 mg Oral Daily    gabapentin  100 mg Oral TID    levothyroxine  50 mcg Oral Daily    lisinopril  20 mg Oral Daily    metOLazone  5 mg Oral Daily    metoprolol  100 mg Oral BID    sodium chloride flush  10 mL Intravenous 2 times per day    heparin (porcine)  5,000 Units Subcutaneous 3 times per day     Continuous Infusions:   dextrose       PRN Meds:. HYDROcodone 5 mg - acetaminophen **OR** HYDROcodone 5 mg - acetaminophen, sodium chloride flush, acetaminophen, cloNIDine, LORazepam, sodium chloride flush, acetaminophen **OR** acetaminophen, magnesium hydroxide, promethazine **OR** ondansetron, glucose, dextrose, glucagon (rDNA), dextrose      DATA:    CBC:   Lab Results   Component Value Date    WBC 6.8 10/13/2020    RBC 3.11 10/13/2020    HGB 9.5 10/13/2020    HCT 30.7 10/13/2020    MCV 98.7 10/13/2020    MCH 30.5 10/13/2020    MCHC 30.9 10/13/2020    RDW 19.9 10/13/2020     10/13/2020    MPV 10.3 10/13/2020     CMP:    Lab Results   Component Value Date     10/13/2020    K 5.1 10/13/2020    K 3.7 10/11/2020     10/13/2020    CO2 22 10/13/2020    BUN 58 10/13/2020    CREATININE 7.9 10/13/2020    GFRAA 7 10/13/2020    LABGLOM 7 10/13/2020    GLUCOSE 290 10/13/2020    GLUCOSE 130 05/18/2012    PROT 5.8 10/11/2020    LABALBU 3.4 10/11/2020    LABALBU 4.1 05/18/2012    CALCIUM 8.5 10/13/2020    BILITOT <0.2 10/11/2020    ALKPHOS 91 10/11/2020    AST 16 10/11/2020    ALT 13 10/11/2020     Magnesium:    Lab Results   Component Value Date    MG 2.2 10/10/2020     Phosphorus:    Lab Results   Component Value Date    PHOS 6.1 10/07/2020     Radiology Review:      Chest x-ray October 8, 2020   No evidence of pneumonia or pleural effusion.               BRIEF SUMMARY OF INITIAL CONSULT:    Briefly, Diamond Trent is a 31year-old female with history of ESRD on peritoneal dialysis ([facility]), poorly controlled type I DM with multiple admissions for DKA, gastroparesis, HTN  UTI, who was admitted October 8, 2020 after she presented to the ER reporting feeling unwell, having lower back pain and vomiting. In the ER her glucose was 570 mg/dL. Her urine showed >20 WBCs. IMPRESSION/RECOMMENDATIONS:      1. ESRD on peritoneal dialysis/[facility]. To continue same prescription. 2. Coagulase-negative Staphylococcus bacteremia, probably source [employer], on vancomycin  3. E faecalis bacteriuria  4. HTN, on metoprolol and diltiazem  5. Type I DM, glucose levels improved  6. Left fifth metacarpal fracture, status post fall  7. Left knee effusion, traumatic  8.  Anemia of CKD, on epoetin alpha 8,000 units tiw    Plan:    · Low potassium diet  · Continue Bumex 2 mg twice daily  · Continue metolazone 5 mg daily  · Continue diltiazem 240 mg daily  · Continue lisinopril 20 mg daily  · Continue metoprolol 100 mg twice a day

## 2020-10-13 NOTE — PLAN OF CARE
Problem: Pain:  Goal: Pain level will decrease  Description: Pain level will decrease  Outcome: Met This Shift  Goal: Control of acute pain  Description: Control of acute pain  Outcome: Met This Shift  Goal: Control of chronic pain  Description: Control of chronic pain  Outcome: Met This Shift     Problem: Falls - Risk of:  Goal: Will remain free from falls  Description: Will remain free from falls  Outcome: Met This Shift  Goal: Absence of physical injury  Description: Absence of physical injury  Outcome: Met This Shift     Problem: Sensory Perception - Impaired:  Goal: Ability to maintain a stable neurologic state will improve  Description: Ability to maintain a stable neurologic state will improve  Outcome: Met This Shift

## 2020-10-13 NOTE — PROGRESS NOTES
PRN  dextrose, 12.5 g, PRN  glucagon (rDNA), 1 mg, PRN  dextrose, 100 mL/hr, PRN         Objective:    BP (!) 104/51   Pulse 91   Temp 98.7 °F (37.1 °C)   Resp 15   Ht 5' 4\" (1.626 m)   Wt 127 lb 3.2 oz (57.7 kg)   SpO2 97%   BMI 21.83 kg/m²        General Appearance: lethargic,   Skin: cool, diaphoretic  Head: normocephalic and atraumatic  Eyes: pupils equal, round, and reactive to light, extraocular eye movements intact, conjunctivae normal  Neck: neck supple and non tender without mass   Pulmonary/Chest: clear to auscultation bilaterally- no wheezes, rales or rhonchi, normal air movement, no respiratory distress  Cardiovascular: normal rate, normal S1 and S2 and no carotid bruits; mediport accessed in left upper chest but not tender or erythematous.    Abdomen: soft, non-tender, non-distended, normal bowel sounds, no masses or organomegaly   Extremities: no cyanosis, no clubbing and no edema  Neurologic: no cranial nerve deficit and speech normal      Recent Labs     10/11/20  0527 10/13/20  0702    136   K 3.7 5.1*    102   CO2 25 22   BUN 38* 58*   CREATININE 6.8* 7.9*   GLUCOSE 23* 290*   CALCIUM 8.6 8.5*       Recent Labs     10/11/20  0527   ALKPHOS 91   PROT 5.8*   LABALBU 3.4*   BILITOT <0.2   AST 16   ALT 13       Recent Labs     10/11/20  0527 10/13/20  0702   WBC 7.7 6.8   RBC 3.45* 3.11*   HGB 10.4* 9.5*   HCT 33.3* 30.7*   MCV 96.5 98.7   MCH 30.1 30.5   MCHC 31.2* 30.9*   RDW 17.6* 19.9*    246   MPV 9.5 10.3        Blood ID, Molecular [[id number]] (Abnormal)  Collected: 10/08/20 1820        Updated: 10/10/20 1228        Bottle Type  Aerobic        Source of Blood Culture  No site given        Order Number  [id number]        Enterobacter cloacae complex by PCR  Not Detected        Escherichia coli by PCR  Not Detected        Klebsiella oxytoca by PCR  Not Detected        Klebsiella pneumoniae by PCR  Not Detected        Proteus by PCR  Not Detected        Streptococcus agalactiae by PCR  Not Detected        Staphylococcus aureus by PCR  Not Detected        Serratia marcescens by PCR  Not Detected        Streptococcus pneumoniae by PCR  Not Detected        Streptococcus pyogenes  by PCR  Not Detected        Acinetobacter baumannii by PCR  Not Detected        Candida albicans by PCR  Not Detected        Candida glabrata by PCR  Not Detected        Candida krusei by PCR  Not Detected        Candida parapsilosis by PCR  Not Detected        Candida tropicalis by PCR  Not Detected        Enterobacteriaceae by PCR  Not Detected        Enterococcus by PCR  Not Detected        Haemophilus Influenzae by PCR  Not Detected        Listeria monocytogenes by PCR  Not Detected        Neisseria meningitidis by PCR  Not Detected        Pseudomonas aeruginosa by PCR  Not Detected        Staphylococcus by PCR  DETECTED        Streptococcus by PCR  Not Detected        Methicillin Resistant by PCR  DETECTED       Narrative:         CALL  Walker  [facility] tel. ,   Microbiology results called to and read back by The Mckay King rn, 10/10/2020   12:28, by Naomi Salts         Culture, Blood 2 [[id number]] (Abnormal)  Collected: 10/08/20 1835       Specimen: Blood  Updated: 10/11/20 0746        Culture, Blood 2  --        Gram stain performed from blood culture bottle media   Gram positive cocci in clusters           Organism  Staphylococcus species        Culture, Blood 2  Identification and sensitivity to follow       Narrative:         CALL  Walker  [facility] tel. ,   Previous panic on this admission - call not needed per SOP, 10/10/2020 12:34,   by Naomi Salts       Culture, Blood 1 [[id number]] (Abnormal)  Collected: 10/08/20 1820       Specimen: Blood  Updated: 10/11/20 0740        Blood Culture, Routine  --        Gram stain performed from blood culture bottle media   Gram positive cocci in clusters           Organism  Staphylococcus species        Blood Culture, Routine  Identification and sensitivity to follow       Narrative:         CALL  Walker  [id number] tel. ,   Microbiology results called to and read back by The Mckay King rn, 10/10/2020   12:34, by Leah Fowler         Culture, Urine [[id number]] (Abnormal)   Collected: 10/08/20 1526       Specimen: Urine, clean catch  Updated: 10/10/20 0712        Organism  Enterococcus faecalis        Urine Culture, Routine  --        50,000 CFU/ml   Identification and sensitivity to follow       Narrative:         Source: URINE       Site:        Radiology:   XR HAND LEFT (MIN 3 VIEWS)   Final Result   1. Question acute traumatic nondisplaced fracture involving the 5th   metacarpal neck. XR KNEE LEFT (3 VIEWS)   Final Result   1. No acute abnormality involving the left knee. XR CHEST PORTABLE   Final Result   No evidence of pneumonia or pleural effusion. Assessment:  Principal Problem:    Uncontrolled type 1 diabetes mellitus with hyperglycemia, with long-term current use of insulin ([facility])  Active Problems:    Hypertension    Severe protein-calorie malnutrition (HCC)    ESRD on peritoneal dialysis ([facility] [address]    Hypothyroidism    Hyperlipidemia    Acute cystitis without hematuria    Hyperglycemia    Nondisplaced fracture of neck of fifth metacarpal bone, left hand, initial encounter for closed fracture  Resolved Problems:    * No resolved hospital problems. *      Plan:    1. Uncontrolled type I DM with hyperglycemia/hypoglycemia: Hypoglycemic this am 10/13. Given amp D50%, started IV bolus NS, Hold lantus for now. low dose insulin slide scale. 2. Sepsis: secondary to UTI/staph bacteremia. Hypotension (80/50), tachycardia (95). Give 2 liter NS fluid bolus. Repeat blood cultures, lactic acid, CMP, mag, phos. Consult critical care for possible transfer to ICU. Abgs, culture peritoneal dialysate  3. UTI: urine culture with Enterococcus faecalis. Given one time dose Fosfomycin 10/9. Start cefepime 500mg Q48 hrs (renal dosing).    4. Staph bacteremia: Continue  Vancomycin to be adjusted for PD. Pharm D consulted. Seen by ID. 5. Low back pain-heating pad and Norco prn.  6. ESRD on PD--gets nightly PD treatment. Seen by nephrology. 7. Left knee pain: s/p fall 10/12. Xray showing no acute abnormality  8.  Left 5th metacarpal fracture: seen by salena, no plans for surgical intervention, maintain splint     Disposition: transfer to ICU     Electronically signed by HEBER Brown CNP on 10/13/2020 at 10:24 AM              \

## 2020-10-14 PROBLEM — M25.462 EFFUSION OF LEFT KNEE: Status: ACTIVE | Noted: 2020-10-14

## 2020-10-14 LAB
ALBUMIN SERPL-MCNC: 3 G/DL (ref 3.5–5.2)
ALP BLD-CCNC: 113 U/L (ref 35–104)
ALT SERPL-CCNC: 43 U/L (ref 0–32)
ANION GAP SERPL CALCULATED.3IONS-SCNC: 13 MMOL/L (ref 7–16)
ANION GAP SERPL CALCULATED.3IONS-SCNC: 14 MMOL/L (ref 7–16)
AST SERPL-CCNC: 41 U/L (ref 0–31)
BILIRUB SERPL-MCNC: <0.2 MG/DL (ref 0–1.2)
BUN BLDV-MCNC: 60 MG/DL (ref 6–20)
BUN BLDV-MCNC: 62 MG/DL (ref 6–20)
C DIFF TOXIN/ANTIGEN: NORMAL
CALCIUM SERPL-MCNC: 8.6 MG/DL (ref 8.6–10.2)
CALCIUM SERPL-MCNC: 8.6 MG/DL (ref 8.6–10.2)
CHLORIDE BLD-SCNC: 102 MMOL/L (ref 98–107)
CHLORIDE BLD-SCNC: 102 MMOL/L (ref 98–107)
CO2: 20 MMOL/L (ref 22–29)
CO2: 22 MMOL/L (ref 22–29)
CREAT SERPL-MCNC: 7.5 MG/DL (ref 0.5–1)
CREAT SERPL-MCNC: 7.6 MG/DL (ref 0.5–1)
GFR AFRICAN AMERICAN: 8
GFR AFRICAN AMERICAN: 8
GFR NON-AFRICAN AMERICAN: 8 ML/MIN/1.73
GFR NON-AFRICAN AMERICAN: 8 ML/MIN/1.73
GLUCOSE BLD-MCNC: 334 MG/DL (ref 74–99)
GLUCOSE BLD-MCNC: 387 MG/DL (ref 74–99)
MAGNESIUM: 2.3 MG/DL (ref 1.6–2.6)
METER GLUCOSE: 146 MG/DL (ref 74–99)
METER GLUCOSE: 163 MG/DL (ref 74–99)
METER GLUCOSE: 333 MG/DL (ref 74–99)
METER GLUCOSE: 403 MG/DL (ref 74–99)
PHOSPHORUS: 8.9 MG/DL (ref 2.5–4.5)
POTASSIUM SERPL-SCNC: 5.3 MMOL/L (ref 3.5–5)
POTASSIUM SERPL-SCNC: 5.8 MMOL/L (ref 3.5–5)
SODIUM BLD-SCNC: 136 MMOL/L (ref 132–146)
SODIUM BLD-SCNC: 137 MMOL/L (ref 132–146)
TOTAL PROTEIN: 5.5 G/DL (ref 6.4–8.3)

## 2020-10-14 PROCEDURE — 2580000003 HC RX 258: Performed by: SPECIALIST

## 2020-10-14 PROCEDURE — 82962 GLUCOSE BLOOD TEST: CPT

## 2020-10-14 PROCEDURE — 99232 SBSQ HOSP IP/OBS MODERATE 35: CPT | Performed by: INTERNAL MEDICINE

## 2020-10-14 PROCEDURE — 80048 BASIC METABOLIC PNL TOTAL CA: CPT

## 2020-10-14 PROCEDURE — 2000000000 HC ICU R&B

## 2020-10-14 PROCEDURE — 6360000002 HC RX W HCPCS: Performed by: NURSE PRACTITIONER

## 2020-10-14 PROCEDURE — 6370000000 HC RX 637 (ALT 250 FOR IP): Performed by: INTERNAL MEDICINE

## 2020-10-14 PROCEDURE — 83735 ASSAY OF MAGNESIUM: CPT

## 2020-10-14 PROCEDURE — 2580000003 HC RX 258: Performed by: INTERNAL MEDICINE

## 2020-10-14 PROCEDURE — 6360000002 HC RX W HCPCS: Performed by: INTERNAL MEDICINE

## 2020-10-14 PROCEDURE — 36591 DRAW BLOOD OFF VENOUS DEVICE: CPT

## 2020-10-14 PROCEDURE — 84100 ASSAY OF PHOSPHORUS: CPT

## 2020-10-14 PROCEDURE — 90945 DIALYSIS ONE EVALUATION: CPT

## 2020-10-14 PROCEDURE — 2060000000 HC ICU INTERMEDIATE R&B

## 2020-10-14 PROCEDURE — 80053 COMPREHEN METABOLIC PANEL: CPT

## 2020-10-14 PROCEDURE — 6360000002 HC RX W HCPCS: Performed by: SPECIALIST

## 2020-10-14 RX ORDER — SODIUM CHLORIDE 9 MG/ML
INJECTION, SOLUTION INTRAVENOUS EVERY 24 HOURS
Status: DISCONTINUED | OUTPATIENT
Start: 2020-10-14 | End: 2020-10-21

## 2020-10-14 RX ORDER — METOPROLOL TARTRATE 50 MG/1
50 TABLET, FILM COATED ORAL 2 TIMES DAILY
Status: DISCONTINUED | OUTPATIENT
Start: 2020-10-14 | End: 2020-10-24 | Stop reason: HOSPADM

## 2020-10-14 RX ADMIN — METOLAZONE 5 MG: 2.5 TABLET ORAL at 09:35

## 2020-10-14 RX ADMIN — HYDROCODONE BITARTRATE AND ACETAMINOPHEN 1 TABLET: 5; 325 TABLET ORAL at 14:06

## 2020-10-14 RX ADMIN — INSULIN LISPRO 4 UNITS: 100 INJECTION, SOLUTION INTRAVENOUS; SUBCUTANEOUS at 12:18

## 2020-10-14 RX ADMIN — METOPROLOL TARTRATE 50 MG: 50 TABLET, FILM COATED ORAL at 20:29

## 2020-10-14 RX ADMIN — GABAPENTIN 100 MG: 100 CAPSULE ORAL at 09:04

## 2020-10-14 RX ADMIN — HYDROCODONE BITARTRATE AND ACETAMINOPHEN 1 TABLET: 5; 325 TABLET ORAL at 02:55

## 2020-10-14 RX ADMIN — INSULIN LISPRO 3 UNITS: 100 INJECTION, SOLUTION INTRAVENOUS; SUBCUTANEOUS at 09:09

## 2020-10-14 RX ADMIN — Medication 10 ML: at 09:01

## 2020-10-14 RX ADMIN — Medication 100 UNITS: at 08:59

## 2020-10-14 RX ADMIN — Medication 10 ML: at 20:41

## 2020-10-14 RX ADMIN — Medication 100 UNITS: at 20:31

## 2020-10-14 RX ADMIN — Medication 300 UNITS: at 09:02

## 2020-10-14 RX ADMIN — INSULIN LISPRO 1 UNITS: 100 INJECTION, SOLUTION INTRAVENOUS; SUBCUTANEOUS at 17:10

## 2020-10-14 RX ADMIN — INSULIN LISPRO 1 UNITS: 100 INJECTION, SOLUTION INTRAVENOUS; SUBCUTANEOUS at 20:32

## 2020-10-14 RX ADMIN — INSULIN LISPRO 3 UNITS: 100 INJECTION, SOLUTION INTRAVENOUS; SUBCUTANEOUS at 17:08

## 2020-10-14 RX ADMIN — DILTIAZEM HYDROCHLORIDE 240 MG: 240 CAPSULE, COATED, EXTENDED RELEASE ORAL at 09:35

## 2020-10-14 RX ADMIN — EPOETIN ALFA-EPBX 8000 UNITS: 4000 INJECTION, SOLUTION INTRAVENOUS; SUBCUTANEOUS at 10:43

## 2020-10-14 RX ADMIN — INSULIN LISPRO 6 UNITS: 100 INJECTION, SOLUTION INTRAVENOUS; SUBCUTANEOUS at 09:09

## 2020-10-14 RX ADMIN — LISINOPRIL 20 MG: 20 TABLET ORAL at 09:00

## 2020-10-14 RX ADMIN — LEVOTHYROXINE SODIUM 50 MCG: 50 TABLET ORAL at 06:39

## 2020-10-14 RX ADMIN — Medication 300 UNITS: at 20:31

## 2020-10-14 RX ADMIN — GABAPENTIN 100 MG: 100 CAPSULE ORAL at 14:07

## 2020-10-14 RX ADMIN — GABAPENTIN 100 MG: 100 CAPSULE ORAL at 20:30

## 2020-10-14 RX ADMIN — HEPARIN SODIUM 5000 UNITS: 5000 INJECTION INTRAVENOUS; SUBCUTANEOUS at 14:04

## 2020-10-14 RX ADMIN — METOPROLOL TARTRATE 100 MG: 50 TABLET, FILM COATED ORAL at 09:00

## 2020-10-14 RX ADMIN — INSULIN LISPRO 3 UNITS: 100 INJECTION, SOLUTION INTRAVENOUS; SUBCUTANEOUS at 12:19

## 2020-10-14 RX ADMIN — HYDROCODONE BITARTRATE AND ACETAMINOPHEN 2 TABLET: 5; 325 TABLET ORAL at 09:13

## 2020-10-14 RX ADMIN — Medication 10 ML: at 09:02

## 2020-10-14 RX ADMIN — ATORVASTATIN CALCIUM 40 MG: 40 TABLET, FILM COATED ORAL at 20:29

## 2020-10-14 RX ADMIN — HEPARIN SODIUM 5000 UNITS: 5000 INJECTION INTRAVENOUS; SUBCUTANEOUS at 06:39

## 2020-10-14 RX ADMIN — HEPARIN SODIUM 5000 UNITS: 5000 INJECTION INTRAVENOUS; SUBCUTANEOUS at 22:30

## 2020-10-14 RX ADMIN — CEFEPIME HYDROCHLORIDE 1 G: 1 INJECTION, POWDER, FOR SOLUTION INTRAMUSCULAR; INTRAVENOUS at 17:52

## 2020-10-14 RX ADMIN — INSULIN GLARGINE 10 UNITS: 100 INJECTION, SOLUTION SUBCUTANEOUS at 09:07

## 2020-10-14 RX ADMIN — LORAZEPAM 0.5 MG: 0.5 TABLET ORAL at 16:43

## 2020-10-14 ASSESSMENT — PAIN DESCRIPTION - ONSET
ONSET: ON-GOING

## 2020-10-14 ASSESSMENT — PAIN DESCRIPTION - PAIN TYPE
TYPE: CHRONIC PAIN
TYPE: ACUTE PAIN
TYPE: CHRONIC PAIN

## 2020-10-14 ASSESSMENT — PAIN DESCRIPTION - LOCATION
LOCATION: ARM
LOCATION: ARM
LOCATION: GENERALIZED
LOCATION: ARM
LOCATION: WRIST
LOCATION: ARM

## 2020-10-14 ASSESSMENT — PAIN DESCRIPTION - PROGRESSION: CLINICAL_PROGRESSION: NOT CHANGED

## 2020-10-14 ASSESSMENT — PAIN DESCRIPTION - DESCRIPTORS
DESCRIPTORS: ACHING;DISCOMFORT

## 2020-10-14 ASSESSMENT — PAIN DESCRIPTION - ORIENTATION
ORIENTATION: LEFT

## 2020-10-14 ASSESSMENT — PAIN SCALES - GENERAL
PAINLEVEL_OUTOF10: 7
PAINLEVEL_OUTOF10: 8
PAINLEVEL_OUTOF10: 8
PAINLEVEL_OUTOF10: 0
PAINLEVEL_OUTOF10: 6
PAINLEVEL_OUTOF10: 8
PAINLEVEL_OUTOF10: 8
PAINLEVEL_OUTOF10: 2

## 2020-10-14 ASSESSMENT — PAIN DESCRIPTION - FREQUENCY
FREQUENCY: CONTINUOUS

## 2020-10-14 ASSESSMENT — PAIN - FUNCTIONAL ASSESSMENT: PAIN_FUNCTIONAL_ASSESSMENT: PREVENTS OR INTERFERES SOME ACTIVE ACTIVITIES AND ADLS

## 2020-10-14 NOTE — PROGRESS NOTES
Pharmacy Consultation Note  (Antibiotic Dosing and Monitoring)    Initial consult date: 10/10/20  Consulting physician: Dr. Nito Garcia  Drug(s): Vancomycin  Indication: Positive blood culture    Ht Readings from Last 1 Encounters:   10/10/20 5' 4\" (1.626 m)     Wt Readings from Last 1 Encounters:   10/13/20 127 lb 6.8 oz (57.8 kg)         Age/  Gender IBW DW  Allergy Information   31 y.o.     female 54.7 kg 58.1 kg  Toradol [ketorolac tromethamine]                 Date  WBC CCPD Drug/Dose Time   Given Level(s)   (Time) Comments   10/10  (#1) 6.8 QHS  Vancomycin 1,000 mg IV x 1 1822     10/11  (#2) 7.7 QHS No dose -- 20.0 mcg/mL @ 1324    10/12  (#3) -- QHS No dose -- 16.4 mcg/mL @ 1511    10/13  (#4) 6.8 QHS Vancomycin 500 mg IV once 1703 14.1 mcg/mL @ 6176    10/14  (#5) -- QHS No dose --       (#6)           (#7)           Estimated Creatinine Clearance: 10 mL/min (A) (based on SCr of 7.6 mg/dL (H)). UOP over the past 24 hours:       Intake/Output Summary (Last 24 hours) at 10/14/2020 1250  Last data filed at 10/13/2020 1703  Gross per 24 hour   Intake 100 ml   Output --   Net 100 ml       Temp max: Temp (24hrs), Av °F (36.7 °C), Min:97.7 °F (36.5 °C), Max:98.5 °F (36.9 °C)      Antibiotic Regimen: No other antibiotics at this time  Antibiotic Dose Date Initiated   Cefepime 500 mg IV Q48H 10/13     Cultures:  available culture and sensitivity results were reviewed in EPIC  Cultures sent and are pending. Culture Date Result    Urine 10/8 E faecalis   Blood #1 10 Methicillin-resistant CoNS   Blood #2 10 Methicillin-resistant CoNS   Blood #3 10/12 NGTD   Blood #4 10/13 In process   MRSA Nares 10/13 In process   C diff 10/13 In process   Peritoneal Body Fluid Cx 10/13 In process     Assessment:  · Consulted by Dr. Nito Garcia to dose/monitor vancomycin  · Goal trough level:  15-20 mcg/mL  · Pt is a 31 yof admitted to the hospital for hyperglycemia.  She is being initiated on vancomycin for a positive blood culture, final c/s pending.   · Wilton is ESRD on PD, cycles nightly, nephro following  · 10/11: Random level @ 1324 = 20.0 mcg/mL  · 10/12: Random level @ 1511 = 16.4 mcg/mL  · 10/13: Random level @ 1548 = 14.1 mcg/mL    Plan:  · No dose vancomycin today  · Repeat random level tomorrow afternoon  · Follow nephro notes/PD schedule  · Pharmacist will follow and monitor/adjust dosing as necessary      Thank you for the consult,    Rashida Collazo, PharmD 10/14/2020 12:50 PM   [phone]

## 2020-10-14 NOTE — PROGRESS NOTES
Department of Internal Medicine  Nephrology Progress Note    Events reviewed. SUBJECTIVE:  We are following [address] [place] [address] for ESRD on peritoneal dialysis. Reports no complaints today.     Physical Exam:    VITALS:  /86   Pulse 84   Temp 97.7 °F (36.5 °C) (Infrared)   Resp 10   Ht 5' 4\" (1.626 m)   Wt 127 lb 6.8 oz (57.8 kg)   SpO2 98%   BMI 21.87 kg/m²   24HR INTAKE/OUTPUT:      Intake/Output Summary (Last 24 hours) at 10/14/2020 5934  Last data filed at 10/13/2020 1703  Gross per 24 hour   Intake 150 ml   Output --   Net 150 ml     Constitutional:  Alert and oriented, in no distress  HEENT:  Normocephalic, PERRL  Respiratory:  CTA bilaterally  Cardiovascular/Edema:  RRR, S1/S2  Gastrointestinal:  Soft, PD catheter exit site clean   Neurologic:  Nonfocal, AVELAR  Skin:  Warm, dry, no lesions  Other:  No edema    Scheduled Meds:   cefepime  500 mg Intravenous Q48H    sodium chloride  1,000 mL Intravenous Once    sodium chloride  1,000 mL Intravenous Once    lidocaine  5 mL Intradermal Once    heparin flush  3 mL Intravenous 2 times per day    lidocaine  5 mL Intradermal Once    heparin flush  1 mL Intravenous 2 times per day    vancomycin (VANCOCIN) intermittent dosing (placeholder)   Other RX Placeholder    insulin lispro  0-6 Units Subcutaneous TID WC    insulin lispro  0-3 Units Subcutaneous Nightly    epoetin nena-epbx  8,000 Units Subcutaneous Once per day on Mon Wed Fri    insulin glargine  10 Units Subcutaneous QAM    [Held by provider] insulin NPH  5 Units Subcutaneous Nightly    insulin lispro  3 Units Subcutaneous TID     sodium chloride flush  10 mL Intravenous 2 times per day    atorvastatin  40 mg Oral Nightly    dilTIAZem  240 mg Oral Daily    gabapentin  100 mg Oral TID    levothyroxine  50 mcg Oral Daily    lisinopril  20 mg Oral Daily    metOLazone  5 mg Oral Daily    metoprolol  100 mg Oral BID    sodium chloride flush  10 mL Intravenous 2 times per day    heparin (porcine)  5,000 Units Subcutaneous 3 times per day     Continuous Infusions:   dextrose Stopped (10/13/20 1304)     PRN Meds:.sodium chloride flush, heparin flush, sodium chloride flush, heparin flush, HYDROcodone 5 mg - acetaminophen **OR** HYDROcodone 5 mg - acetaminophen, acetaminophen, cloNIDine, LORazepam, acetaminophen **OR** acetaminophen, magnesium hydroxide, promethazine **OR** ondansetron, glucose, dextrose, glucagon (rDNA), dextrose      DATA:    CBC:   Lab Results   Component Value Date    WBC 6.8 10/13/2020    RBC 3.11 10/13/2020    HGB 9.5 10/13/2020    HCT 30.7 10/13/2020    MCV 98.7 10/13/2020    MCH 30.5 10/13/2020    MCHC 30.9 10/13/2020    RDW 19.9 10/13/2020     10/13/2020    MPV 10.3 10/13/2020     CMP:    Lab Results   Component Value Date     10/14/2020    K 5.8 10/14/2020    K 3.7 10/11/2020     10/14/2020    CO2 22 10/14/2020    BUN 62 10/14/2020    CREATININE 7.5 10/14/2020    GFRAA 8 10/14/2020    LABGLOM 8 10/14/2020    GLUCOSE 387 10/14/2020    GLUCOSE 130 05/18/2012    PROT 5.5 10/14/2020    LABALBU 3.0 10/14/2020    LABALBU 4.1 05/18/2012    CALCIUM 8.6 10/14/2020    BILITOT <0.2 10/14/2020    ALKPHOS 113 10/14/2020    AST 41 10/14/2020    ALT 43 10/14/2020     Magnesium:    Lab Results   Component Value Date    MG 2.3 10/14/2020     Phosphorus:    Lab Results   Component Value Date    PHOS 8.9 10/14/2020     Radiology Review:      Chest x-ray October 8, 2020   No evidence of pneumonia or pleural effusion.               BRIEF SUMMARY OF INITIAL CONSULT:    Briefly, Duy Matson is a 31year-old female with history of ESRD on peritoneal dialysis (CCPD), poorly controlled type I DM with multiple admissions for DKA, gastroparesis, HTN  UTI, who was admitted October 8, 2020 after she presented to the ER reporting feeling unwell, having lower back pain and vomiting. In the ER her glucose was 570 mg/dL.   Her urine showed >20 WBCs.    IMPRESSION/RECOMMENDATIONS:      1. ESRD on peritoneal dialysis/CCPD. To continue same prescription. 2. Hyperkalemia 2/2 excess intake and lack of insulin; she has been given 10 units of lantus and 9 units of humalog this morning- will recheck BMP in 2 hours. 3. Coagulase-negative Staphylococcus bacteremia, probably source MediPort, on vancomycin  4. E faecalis bacteriuria  5. HTN, on metoprolol and diltiazem  6. Type I DM, glucose levels improved  7. Left fifth metacarpal fracture, status post fall  8. Left knee effusion, traumatic  9.  Anemia of CKD, on epoetin alpha 8,000 units tiw    Plan:    · Low potassium diet  · BMP at noon  · Continue CCPD  · Continue Bumex 2 mg twice daily  · Continue metolazone 5 mg daily  · Continue diltiazem 240 mg daily  · Continue lisinopril 20 mg daily  · Continue metoprolol 100 mg twice a day    Electronically signed by HEBER Crawley CNP on 10/14/2020 at 9:29 AM

## 2020-10-14 NOTE — CARE COORDINATION
10/14/2020 No Covid testing. C diff and VRE rule out- pt in icu/isolation. She is more awake today. She is requesting a letter for her mom to take to a meeting for her [employer] housing appointment today- provided this letter. Pt has a mediport and she has a peritoneal dialysis port. Wilton is currently on iv maxipime and iv vanco. Watch for iv antibiotics at discharge. CM/SS to follow.  Electronically signed by TOR Diaz on 10/14/2020 at 9:45 AM

## 2020-10-14 NOTE — PLAN OF CARE

## 2020-10-14 NOTE — PROGRESS NOTES
[address] [place], [place] [address]ist   Progress Note    Admitting Date and Time: 10/8/2020  3:15 PM  Admit Dx: Type I diabetes mellitus with hyperosmolar coma ([facility] [address] [E10.69, E10.65]    Subjective/interval history:    Pt Javier to intensive care unit yesterday for lethargy, hypotension, hypoglycemia. This morning she is awake, alert. She complains of pain in her left hand as well as generalized chronic pain. Blood glucose 387 this morning. She is due for her dose of Lantus. ROS: denies fever, chills, cp, sob, n/v, HA unless stated above.      cefepime  500 mg Intravenous Q48H    sodium chloride  1,000 mL Intravenous Once    sodium chloride  1,000 mL Intravenous Once    lidocaine  5 mL Intradermal Once    heparin flush  3 mL Intravenous 2 times per day    lidocaine  5 mL Intradermal Once    heparin flush  1 mL Intravenous 2 times per day    vancomycin (VANCOCIN) intermittent dosing (placeholder)   Other RX Placeholder    insulin lispro  0-6 Units Subcutaneous TID WC    insulin lispro  0-3 Units Subcutaneous Nightly    epoetin nena-epbx  8,000 Units Subcutaneous Once per day on Mon Wed Fri    insulin glargine  10 Units Subcutaneous QAM    [Held by provider] insulin NPH  5 Units Subcutaneous Nightly    insulin lispro  3 Units Subcutaneous TID     sodium chloride flush  10 mL Intravenous 2 times per day    atorvastatin  40 mg Oral Nightly    dilTIAZem  240 mg Oral Daily    gabapentin  100 mg Oral TID    levothyroxine  50 mcg Oral Daily    lisinopril  20 mg Oral Daily    metOLazone  5 mg Oral Daily    metoprolol  100 mg Oral BID    sodium chloride flush  10 mL Intravenous 2 times per day    heparin (porcine)  5,000 Units Subcutaneous 3 times per day     sodium chloride flush, 10 mL, PRN  heparin flush, 3 mL, PRN  sodium chloride flush, 10 mL, PRN  heparin flush, 1 mL, PRN  HYDROcodone 5 mg - acetaminophen, 1 tablet, Q4H PRN    Or  HYDROcodone 5 mg - acetaminophen, 2 tablet, Q4H 30.9 10/13/2020    RDW 19.9 10/13/2020     10/13/2020    MPV 10.3 10/13/2020     BMP:    Lab Results   Component Value Date     10/14/2020    K 5.8 10/14/2020    K 3.7 10/11/2020     10/14/2020    CO2 22 10/14/2020    BUN 62 10/14/2020    LABALBU 3.0 10/14/2020    LABALBU 4.1 05/18/2012    CREATININE 7.5 10/14/2020    CALCIUM 8.6 10/14/2020    GFRAA 8 10/14/2020    LABGLOM 8 10/14/2020    GLUCOSE 387 10/14/2020    GLUCOSE 130 05/18/2012        Radiology:   US DUP LOWER EXTREMITIES BILATERAL VENOUS   Final Result   No evidence of DVT in either lower extremity. XR CHEST PORTABLE   Final Result   1. No active pulmonary disease. CT HEAD WO CONTRAST   Final Result   1. No acute intracranial abnormality. XR HAND LEFT (MIN 3 VIEWS)   Final Result   Addendum 1 of 1   ADDENDUM:   Recommend clinical correlation for point tenderness involving the 5th   metacarpal neck. Final      XR KNEE LEFT (3 VIEWS)   Final Result   1. No acute abnormality involving the left knee. XR CHEST PORTABLE   Final Result   No evidence of pneumonia or pleural effusion. Assessment/Plan:  Principal Problem:    Uncontrolled type 1 diabetes mellitus with hyperglycemia, with long-term current use of insulin (HCC)  Active Problems:    Hypertension    Severe protein-calorie malnutrition (HCC)    ESRD on peritoneal dialysis ([facility] [address]    Hypothyroidism    Hyperlipidemia    Acute cystitis without hematuria    Hyperglycemia    Nondisplaced fracture of neck of fifth metacarpal bone, left hand, initial encounter for closed fracture    Acute encephalopathy    Effusion of left knee  Resolved Problems:    * No resolved hospital problems. *      1.   Acute metabolic encephalopathy  -Resolved  -Likely due to prolonged hypoglycemia and hypotension  -Stable for transfer back to [facility]    2.  Sepsis secondary to staph epidermidis bacteremia  -On vancomycin per infectious disease  -Cefepime was added yesterday due to concern for sepsis as an etiology of her presentation yesterday morning  -if repeat blood cultures are negative, discontinue cefepime    3. Uncontrolled type 1 diabetes mellitus  -Blood sugars remain extremely labile  -Ultimately she will need continuous glucose monitor and insulin pump. She is following with endocrinology as an outpatient    4. Urinary tract infection  -Treated with fosfomycin on 10/9    5. Left fifth metacarpal fracture versus contusion  -Splinted per orthopedic surgery  -Nonweightbearing per orthopedic surgery recommendation    6. Chronic low back pain  -Heating pad and Norco as needed    7. Left knee traumatic effusion  -wrapped per orthopedic surgery    8. End-stage renal disease on peritoneal dialysis  -Continue nightly peritoneal dialysis regimen per nephrology    9. Hypothyroidism  -Continue levothyroxine    10. Hyperlipidemia  -Continue atorvastatin      NOTE: This report was transcribed using voice recognition software. Every effort was made to ensure accuracy; however, inadvertent computerized transcription errors may be present.      Electronically signed by [employer] on 10/14/2020 at 8:21 AM

## 2020-10-14 NOTE — FLOWSHEET NOTE
CCPD started without complication; using sterile technique I change out Nava tubing extension and added 12 in [address] extension set; then connected patient to cycler and started treatment; effluent sent to lab for culture; report given to Sussy Londonmike Sam

## 2020-10-14 NOTE — PROGRESS NOTES
[facility]  [facility]  Progress Note    NAME:Wilton Flores  1990  DATE OF SERVICE:10/14/20    Pt was seen FACE TO FACE FOR atbx    SUBJECTIVE:  Chief Complaint   Patient presents with    Hyperglycemia     blood sugar high today vomiting back pain     TRANSFERRED TO icu FOR ms CHANGES  HAS DIARRHEA cdif fneg  Afebrile   More awake  Patient is tolerating medications. No reported adverse drug reactions. Review of systems:  As stated above in the chief complaint, otherwise negative.     Medications:  Scheduled Meds:   cefepime  500 mg Intravenous Q48H    sodium chloride  1,000 mL Intravenous Once    sodium chloride  1,000 mL Intravenous Once    lidocaine  5 mL Intradermal Once    heparin flush  3 mL Intravenous 2 times per day    lidocaine  5 mL Intradermal Once    heparin flush  1 mL Intravenous 2 times per day    vancomycin (VANCOCIN) intermittent dosing (placeholder)   Other RX Placeholder    insulin lispro  0-6 Units Subcutaneous TID WC    insulin lispro  0-3 Units Subcutaneous Nightly    epoetin nena-epbx  8,000 Units Subcutaneous Once per day on Mon Wed Fri    insulin glargine  10 Units Subcutaneous QAM    [Held by provider] insulin NPH  5 Units Subcutaneous Nightly    insulin lispro  3 Units Subcutaneous TID WC    sodium chloride flush  10 mL Intravenous 2 times per day    atorvastatin  40 mg Oral Nightly    dilTIAZem  240 mg Oral Daily    gabapentin  100 mg Oral TID    levothyroxine  50 mcg Oral Daily    lisinopril  20 mg Oral Daily    metOLazone  5 mg Oral Daily    metoprolol  100 mg Oral BID    sodium chloride flush  10 mL Intravenous 2 times per day    heparin (porcine)  5,000 Units Subcutaneous 3 times per day     Continuous Infusions:   dextrose Stopped (10/13/20 1304)     PRN Meds:sodium chloride flush, heparin flush, sodium chloride flush, heparin flush, HYDROcodone 5 mg - acetaminophen **OR** HYDROcodone 5 mg - acetaminophen, Component Value Date    SEDRATE 19 10/13/2020    SEDRATE 135 (H) 11/01/2019    SEDRATE 14 09/28/2017       Microbiology:   No results for input(s): COVID19 in the last 72 hours. Lab Results   Component Value Date    BLOODCULT2 5 Days no growth 07/18/2020    BLOODCULT2 5 Days- no growth 02/09/2020    BLOODCULT2 5 Days- no growth 02/08/2020    ORG Staphylococcus epidermidis 10/08/2020    ORG Staphylococcus epidermidis 10/08/2020    ORG Enterococcus faecalis 10/08/2020     WOUND/ABSCESS   Date Value Ref Range Status   12/11/2014 (A)  Final    Mixed yulisa also isolated, including:  Alpha hemolytic Strep species  Corynebacteria     12/11/2014 Heavy growth  Final     No results found for: RESPSMEAR  No results found for: MPNEUMO, CLAMYDCU, LABLEGI, AFBCX, FUNGSM, LABFUNG    MRSA Culture Only   Date Value Ref Range Status   10/30/2019 Methicillin resistant Staph aureus not isolated  Final        ASSESSMENT/PLAN:  Cons bacteremia has left MediPort     E. faecalis bacteruria   CKD PD CATH  S/p fall   -most likely line infection  -f/u blood cx  Cefepime   Esr/crp YULISSA  vancomycin (VANCOCIN) intermittent dosing (placeholder), RX Placeholder     vanco Dosed 10/13 level in am     · Monitor labs    Imaging and labs were reviewed per medical records. The patient was educated about the diagnosis, prognosis, indications, risks and benefits of treatment. An opportunity to ask questions was given to the patient/FAMILY. Thank you for involving me in the care of [address] [place] [address] I will continue to follow. Please do not hesitate to call for any questions or concerns.     Electronically signed by Gulshan Thompson MD on 10/14/2020 at 2:34 PM

## 2020-10-14 NOTE — FLOWSHEET NOTE
CCPD completed. No complications through treatment. Effluent drain 800 ml clear pale yellow, no fibrin dectected. PD catheter clamped and capped. Dressing changed to PD exit site - no redness or drainage. Site cleansed and dry dressing secured over site.

## 2020-10-15 LAB
ALBUMIN SERPL-MCNC: 2.9 G/DL (ref 3.5–5.2)
ALP BLD-CCNC: 98 U/L (ref 35–104)
ALT SERPL-CCNC: 37 U/L (ref 0–32)
ANION GAP SERPL CALCULATED.3IONS-SCNC: 15 MMOL/L (ref 7–16)
AST SERPL-CCNC: 26 U/L (ref 0–31)
BILIRUB SERPL-MCNC: <0.2 MG/DL (ref 0–1.2)
BUN BLDV-MCNC: 61 MG/DL (ref 6–20)
CALCIUM SERPL-MCNC: 8.8 MG/DL (ref 8.6–10.2)
CHLORIDE BLD-SCNC: 104 MMOL/L (ref 98–107)
CO2: 21 MMOL/L (ref 22–29)
CREAT SERPL-MCNC: 7.5 MG/DL (ref 0.5–1)
GFR AFRICAN AMERICAN: 8
GFR NON-AFRICAN AMERICAN: 8 ML/MIN/1.73
GLUCOSE BLD-MCNC: 190 MG/DL (ref 74–99)
METER GLUCOSE: 149 MG/DL (ref 74–99)
METER GLUCOSE: 160 MG/DL (ref 74–99)
METER GLUCOSE: 200 MG/DL (ref 74–99)
METER GLUCOSE: 73 MG/DL (ref 74–99)
MRSA CULTURE ONLY: NORMAL
POTASSIUM SERPL-SCNC: 4.7 MMOL/L (ref 3.5–5)
SODIUM BLD-SCNC: 140 MMOL/L (ref 132–146)
TOTAL PROTEIN: 5.3 G/DL (ref 6.4–8.3)
VANCOMYCIN RANDOM: 10.1 MCG/ML (ref 5–40)

## 2020-10-15 PROCEDURE — 6370000000 HC RX 637 (ALT 250 FOR IP): Performed by: SPECIALIST

## 2020-10-15 PROCEDURE — 6370000000 HC RX 637 (ALT 250 FOR IP): Performed by: INTERNAL MEDICINE

## 2020-10-15 PROCEDURE — 36591 DRAW BLOOD OFF VENOUS DEVICE: CPT

## 2020-10-15 PROCEDURE — 99232 SBSQ HOSP IP/OBS MODERATE 35: CPT | Performed by: INTERNAL MEDICINE

## 2020-10-15 PROCEDURE — 6360000002 HC RX W HCPCS: Performed by: INTERNAL MEDICINE

## 2020-10-15 PROCEDURE — 2580000003 HC RX 258: Performed by: SPECIALIST

## 2020-10-15 PROCEDURE — 1200000000 HC SEMI PRIVATE

## 2020-10-15 PROCEDURE — 80202 ASSAY OF VANCOMYCIN: CPT

## 2020-10-15 PROCEDURE — 2580000003 HC RX 258: Performed by: INTERNAL MEDICINE

## 2020-10-15 PROCEDURE — 80053 COMPREHEN METABOLIC PANEL: CPT

## 2020-10-15 PROCEDURE — P9047 ALBUMIN (HUMAN), 25%, 50ML: HCPCS | Performed by: INTERNAL MEDICINE

## 2020-10-15 PROCEDURE — 82962 GLUCOSE BLOOD TEST: CPT

## 2020-10-15 PROCEDURE — 90945 DIALYSIS ONE EVALUATION: CPT

## 2020-10-15 PROCEDURE — 6360000002 HC RX W HCPCS: Performed by: SPECIALIST

## 2020-10-15 RX ORDER — ALBUMIN (HUMAN) 12.5 G/50ML
25 SOLUTION INTRAVENOUS EVERY 8 HOURS
Status: COMPLETED | OUTPATIENT
Start: 2020-10-15 | End: 2020-10-17

## 2020-10-15 RX ORDER — LACTOBACILLUS RHAMNOSUS GG 10B CELL
1 CAPSULE ORAL EVERY 12 HOURS
Status: DISCONTINUED | OUTPATIENT
Start: 2020-10-15 | End: 2020-10-24 | Stop reason: HOSPADM

## 2020-10-15 RX ADMIN — METOPROLOL TARTRATE 50 MG: 50 TABLET, FILM COATED ORAL at 20:31

## 2020-10-15 RX ADMIN — DILTIAZEM HYDROCHLORIDE 240 MG: 240 CAPSULE, COATED, EXTENDED RELEASE ORAL at 08:10

## 2020-10-15 RX ADMIN — VANCOMYCIN HYDROCHLORIDE 750 MG: 1 INJECTION, POWDER, LYOPHILIZED, FOR SOLUTION INTRAVENOUS at 18:43

## 2020-10-15 RX ADMIN — Medication 100 UNITS: at 22:14

## 2020-10-15 RX ADMIN — INSULIN LISPRO 1 UNITS: 100 INJECTION, SOLUTION INTRAVENOUS; SUBCUTANEOUS at 20:37

## 2020-10-15 RX ADMIN — Medication 10 ML: at 20:31

## 2020-10-15 RX ADMIN — INSULIN LISPRO 3 UNITS: 100 INJECTION, SOLUTION INTRAVENOUS; SUBCUTANEOUS at 18:41

## 2020-10-15 RX ADMIN — ALBUMIN (HUMAN) 25 G: 0.25 INJECTION, SOLUTION INTRAVENOUS at 17:56

## 2020-10-15 RX ADMIN — Medication 1 CAPSULE: at 20:32

## 2020-10-15 RX ADMIN — Medication 10 ML: at 08:11

## 2020-10-15 RX ADMIN — ATORVASTATIN CALCIUM 40 MG: 40 TABLET, FILM COATED ORAL at 20:32

## 2020-10-15 RX ADMIN — HYDROCODONE BITARTRATE AND ACETAMINOPHEN 2 TABLET: 5; 325 TABLET ORAL at 20:32

## 2020-10-15 RX ADMIN — Medication 1 CAPSULE: at 09:59

## 2020-10-15 RX ADMIN — HEPARIN SODIUM 5000 UNITS: 5000 INJECTION INTRAVENOUS; SUBCUTANEOUS at 14:19

## 2020-10-15 RX ADMIN — HEPARIN SODIUM 5000 UNITS: 5000 INJECTION INTRAVENOUS; SUBCUTANEOUS at 05:31

## 2020-10-15 RX ADMIN — HYDROCODONE BITARTRATE AND ACETAMINOPHEN 2 TABLET: 5; 325 TABLET ORAL at 14:18

## 2020-10-15 RX ADMIN — Medication 100 UNITS: at 08:09

## 2020-10-15 RX ADMIN — CEFEPIME HYDROCHLORIDE 1 G: 1 INJECTION, POWDER, FOR SOLUTION INTRAMUSCULAR; INTRAVENOUS at 17:10

## 2020-10-15 RX ADMIN — INSULIN LISPRO 1 UNITS: 100 INJECTION, SOLUTION INTRAVENOUS; SUBCUTANEOUS at 18:40

## 2020-10-15 RX ADMIN — ALBUMIN (HUMAN) 25 G: 0.25 INJECTION, SOLUTION INTRAVENOUS at 11:00

## 2020-10-15 RX ADMIN — GABAPENTIN 100 MG: 100 CAPSULE ORAL at 14:18

## 2020-10-15 RX ADMIN — INSULIN LISPRO 3 UNITS: 100 INJECTION, SOLUTION INTRAVENOUS; SUBCUTANEOUS at 09:46

## 2020-10-15 RX ADMIN — HYDROCODONE BITARTRATE AND ACETAMINOPHEN 2 TABLET: 5; 325 TABLET ORAL at 09:55

## 2020-10-15 RX ADMIN — GABAPENTIN 100 MG: 100 CAPSULE ORAL at 08:10

## 2020-10-15 RX ADMIN — METOLAZONE 5 MG: 2.5 TABLET ORAL at 08:10

## 2020-10-15 RX ADMIN — HYDROCODONE BITARTRATE AND ACETAMINOPHEN 1 TABLET: 5; 325 TABLET ORAL at 05:19

## 2020-10-15 RX ADMIN — HEPARIN SODIUM 5000 UNITS: 5000 INJECTION INTRAVENOUS; SUBCUTANEOUS at 20:37

## 2020-10-15 RX ADMIN — INSULIN GLARGINE 10 UNITS: 100 INJECTION, SOLUTION SUBCUTANEOUS at 09:46

## 2020-10-15 RX ADMIN — Medication 300 UNITS: at 08:11

## 2020-10-15 RX ADMIN — LORAZEPAM 0.5 MG: 0.5 TABLET ORAL at 18:02

## 2020-10-15 RX ADMIN — GABAPENTIN 100 MG: 100 CAPSULE ORAL at 20:32

## 2020-10-15 RX ADMIN — LEVOTHYROXINE SODIUM 50 MCG: 50 TABLET ORAL at 05:18

## 2020-10-15 RX ADMIN — SODIUM CHLORIDE: 9 INJECTION, SOLUTION INTRAVENOUS at 22:14

## 2020-10-15 RX ADMIN — INSULIN LISPRO 1 UNITS: 100 INJECTION, SOLUTION INTRAVENOUS; SUBCUTANEOUS at 09:47

## 2020-10-15 RX ADMIN — METOPROLOL TARTRATE 50 MG: 50 TABLET, FILM COATED ORAL at 08:10

## 2020-10-15 ASSESSMENT — PAIN SCALES - GENERAL
PAINLEVEL_OUTOF10: 3
PAINLEVEL_OUTOF10: 7
PAINLEVEL_OUTOF10: 7
PAINLEVEL_OUTOF10: 8
PAINLEVEL_OUTOF10: 7

## 2020-10-15 ASSESSMENT — PAIN DESCRIPTION - DESCRIPTORS: DESCRIPTORS: HEADACHE

## 2020-10-15 ASSESSMENT — PAIN DESCRIPTION - ONSET: ONSET: GRADUAL

## 2020-10-15 ASSESSMENT — PAIN DESCRIPTION - ORIENTATION: ORIENTATION: MID

## 2020-10-15 ASSESSMENT — PAIN DESCRIPTION - PAIN TYPE: TYPE: ACUTE PAIN

## 2020-10-15 ASSESSMENT — PAIN DESCRIPTION - FREQUENCY: FREQUENCY: INTERMITTENT

## 2020-10-15 ASSESSMENT — PAIN DESCRIPTION - LOCATION: LOCATION: HEAD

## 2020-10-15 ASSESSMENT — PAIN DESCRIPTION - PROGRESSION: CLINICAL_PROGRESSION: GRADUALLY WORSENING

## 2020-10-15 NOTE — PROGRESS NOTES
Physician Progress Note      PATIENTDaryann Caldera  CSN #:                  [id number]  :                       1990  ADMIT DATE:       10/8/2020 3:15 PM  [address] [place] DATE:  RESPONDING  PROVIDER #:        Mirna Galarza MD          QUERY TEXT:    Dear Attending Provider,    Pt admitted with Hyperglycemia. Pt noted to have Sepsis and Concern for   mediport infection. If possible, please document in the progress notes and   discharge summary if you are evaluating and / or treating any of the   following: The medical record reflects the following:  Risk Factors: ESRD, Mediport, diabetic  Clinical Indicators:10/13 ID consult PN:Cons bacteremia has left MediPort,   most likely line infection;  Treatment: Blood cultures, ID consult, vancomycin, Cefepime    Thank you,  Rosie Flores RN, BSN, CCDS  [phone]  Options provided:  -- Sepsis related to mediport  -- Sepsis unrelated to mediport  -- No Sepsis, only Central Line Associated Blood Stream Infection  -- Other - I will add my own diagnosis  -- Disagree - Not applicable / Not valid  -- Disagree - Clinically unable to determine / Unknown  -- Refer to Clinical Documentation Reviewer    PROVIDER RESPONSE TEXT:    This patient has sepsis related to mediport. Query created by:  Yari Cameron on 10/14/2020 12:10 PM      Electronically signed by:  Mirna Galarza MD 10/15/2020 2:03 AM

## 2020-10-15 NOTE — PROGRESS NOTES
[address] [place], [place] [address]ist   Progress Note    Admitting Date and Time: 10/8/2020  3:15 PM  Admit Dx: Type I diabetes mellitus with hyperosmolar coma ([facility] [address] [E10.69, E10.65]    Subjective/interval history:    Patient awake and alert, complains of pain in her left hand. Improved with as needed pain medicines. Blood cultures drawn on 10/13 did not show any growth. Blood sugars better controlled today. ROS: denies fever, chills, cp, sob, n/v, HA unless stated above.      lactobacillus  1 capsule Oral Q12H    albumin human  25 g Intravenous Q8H    metoprolol  50 mg Oral BID    cefepime  1 g Intravenous Q24H    sodium chloride  1,000 mL Intravenous Once    sodium chloride  1,000 mL Intravenous Once    lidocaine  5 mL Intradermal Once    heparin flush  3 mL Intravenous 2 times per day    lidocaine  5 mL Intradermal Once    heparin flush  1 mL Intravenous 2 times per day    vancomycin (VANCOCIN) intermittent dosing (placeholder)   Other RX Placeholder    insulin lispro  0-6 Units Subcutaneous TID WC    insulin lispro  0-3 Units Subcutaneous Nightly    epoetin nena-epbx  8,000 Units Subcutaneous Once per day on Mon Wed Fri    insulin glargine  10 Units Subcutaneous QAM    [Held by provider] insulin NPH  5 Units Subcutaneous Nightly    insulin lispro  3 Units Subcutaneous TID WC    sodium chloride flush  10 mL Intravenous 2 times per day    atorvastatin  40 mg Oral Nightly    dilTIAZem  240 mg Oral Daily    gabapentin  100 mg Oral TID    levothyroxine  50 mcg Oral Daily    metOLazone  5 mg Oral Daily    sodium chloride flush  10 mL Intravenous 2 times per day    heparin (porcine)  5,000 Units Subcutaneous 3 times per day     sodium chloride flush, 10 mL, PRN  heparin flush, 3 mL, PRN  sodium chloride flush, 10 mL, PRN  heparin flush, 1 mL, PRN  HYDROcodone 5 mg - acetaminophen, 1 tablet, Q4H PRN    Or  HYDROcodone 5 mg - acetaminophen, 2 tablet, Q4H PRN  acetaminophen, 650 mg, Q4H PRN  cloNIDine, 0.1 mg, BID PRN  LORazepam, 0.5 mg, BID PRN  acetaminophen, 650 mg, Q6H PRN    Or  acetaminophen, 650 mg, Q6H PRN  magnesium hydroxide, 30 mL, Daily PRN  promethazine, 12.5 mg, Q6H PRN    Or  ondansetron, 4 mg, Q6H PRN  glucose, 15 g, PRN  dextrose, 12.5 g, PRN  glucagon (rDNA), 1 mg, PRN  dextrose, 100 mL/hr, PRN         Objective:    BP (!) 141/98   Pulse 91   Temp 97.2 °F (36.2 °C) (Infrared)   Resp 12   Ht 5' 4\" (1.626 m)   Wt 127 lb 6.8 oz (57.8 kg)   SpO2 100%   BMI 21.87 kg/m²   General Appearance: alert and oriented to person, place and time and in no acute distress  Skin: warm and dry  Head: normocephalic and atraumatic  Eyes: pupils equal, round, and reactive to light, extraocular eye movements intact, conjunctivae normal  Neck: neck supple and non tender without mass   Pulmonary/Chest: clear to auscultation bilaterally- no wheezes, rales or rhonchi, normal air movement, no respiratory distress  Cardiovascular: normal rate, normal S1 and S2 and no carotid bruits  Abdomen: soft, non-tender, non-distended, normal bowel sounds, no masses or organomegaly.   Peritoneal dialysis catheter in place without surrounding erythema or drainage  Extremities: no cyanosis, no clubbing and no edema  Neurologic: no cranial nerve deficit and speech normal      Recent Labs     10/14/20  0629 10/14/20  1136 10/15/20  0533    136 140   K 5.8* 5.3* 4.7    102 104   CO2 22 20* 21*   BUN 62* 60* 61*   CREATININE 7.5* 7.6* 7.5*   GLUCOSE 387* 334* 190*   CALCIUM 8.6 8.6 8.8       Recent Labs     10/14/20  0629 10/15/20  0533   ALKPHOS 113* 98   PROT 5.5* 5.3*   LABALBU 3.0* 2.9*   BILITOT <0.2 <0.2   AST 41* 26   ALT 43* 37*       Recent Labs     10/13/20  0702   WBC 6.8   RBC 3.11*   HGB 9.5*   HCT 30.7*   MCV 98.7   MCH 30.5   MCHC 30.9*   RDW 19.9*      MPV 10.3       CBC:   Lab Results   Component Value Date    WBC 6.8 10/13/2020    RBC 3.11 10/13/2020    HGB 9.5 10/13/2020    HCT 30.7 bacteremia  -On vancomycin and cefepime per infectious disease    3. Uncontrolled type 1 diabetes mellitus  -Blood sugars remain extremely labile  -Ultimately she will need continuous glucose monitor and insulin pump. She is following with endocrinology as an outpatient    4. Urinary tract infection  -Treated with fosfomycin on 10/9    5. Left fifth metacarpal fracture versus contusion  -Splinted per orthopedic surgery  -Nonweightbearing per orthopedic surgery recommendation    6. Chronic low back pain  -Heating pad and Norco as needed    7. Left knee traumatic effusion  -wrapped per orthopedic surgery    8. End-stage renal disease on peritoneal dialysis  -Continue nightly peritoneal dialysis regimen per nephrology    9. Hypothyroidism  -Continue levothyroxine    10. Hyperlipidemia  -Continue atorvastatin    Disposition: Likely discharge to home when final blood culture results are back and antibiotic regimen has been set up    NOTE: This report was transcribed using voice recognition software. Every effort was made to ensure accuracy; however, inadvertent computerized transcription errors may be present.      Electronically signed by Trip Blair DO on 10/15/2020 at 5:37 PM

## 2020-10-15 NOTE — PROGRESS NOTES
[facility]  [facility]  Progress Note    NAME:Wilton Flores  1990  DATE OF SERVICE:10/15/20    Pt was seen FACE TO FACE FOR atbx    SUBJECTIVE:  Chief Complaint   Patient presents with    Hyperglycemia     blood sugar high today vomiting back pain     TRANSFERRED TO icu FOR ms CHANGES RESOLVED   HAS DIARRHEA cdiff neg  C/O PAIN S/P FALL  Afebrile   More awake  Patient is tolerating medications. No reported adverse drug reactions. Review of systems:  As stated above in the chief complaint, otherwise negative.     Medications:  Scheduled Meds:   metoprolol  50 mg Oral BID    cefepime  1 g Intravenous Q24H    sodium chloride  1,000 mL Intravenous Once    sodium chloride  1,000 mL Intravenous Once    lidocaine  5 mL Intradermal Once    heparin flush  3 mL Intravenous 2 times per day    lidocaine  5 mL Intradermal Once    heparin flush  1 mL Intravenous 2 times per day    vancomycin (VANCOCIN) intermittent dosing (placeholder)   Other RX Placeholder    insulin lispro  0-6 Units Subcutaneous TID WC    insulin lispro  0-3 Units Subcutaneous Nightly    epoetin nena-epbx  8,000 Units Subcutaneous Once per day on Mon Wed Fri    insulin glargine  10 Units Subcutaneous QAM    [Held by provider] insulin NPH  5 Units Subcutaneous Nightly    insulin lispro  3 Units Subcutaneous TID WC    sodium chloride flush  10 mL Intravenous 2 times per day    atorvastatin  40 mg Oral Nightly    dilTIAZem  240 mg Oral Daily    gabapentin  100 mg Oral TID    levothyroxine  50 mcg Oral Daily    metOLazone  5 mg Oral Daily    sodium chloride flush  10 mL Intravenous 2 times per day    heparin (porcine)  5,000 Units Subcutaneous 3 times per day     Continuous Infusions:   sodium chloride      dextrose Stopped (10/13/20 1304)     PRN Meds:sodium chloride flush, heparin flush, sodium chloride flush, heparin flush, HYDROcodone 5 mg - acetaminophen **OR** HYDROcodone 5 mg - acetaminophen, acetaminophen, cloNIDine, LORazepam, acetaminophen **OR** acetaminophen, magnesium hydroxide, promethazine **OR** ondansetron, glucose, dextrose, glucagon (rDNA), dextrose    OBJECTIVE:  /77   Pulse 88   Temp 97 °F (36.1 °C) (Infrared)   Resp 12   Ht 5' 4\" (1.626 m)   Wt 127 lb 6.8 oz (57.8 kg)   SpO2 100%   BMI 21.87 kg/m²   Temp  Av.3 °F (36.3 °C)  Min: 97 °F (36.1 °C)  Max: 97.5 °F (36.4 °C)  Constitutional:  The patient is awake, ALERT  Skin:    Warm and dry. No rashes were noted. HEENT:      AT/NC  Neck:    Supple to movements. Chest:   No use of accessory muscles to breathe. Symmetrical expansion. Cardiovascular:  S1 and S2 are rhythmic and regular. No murmurs appreciated. Abdomen:   Positive bowel sounds to auscultation. Benign to palpation. Pd CATH   Extremities:    edema.  LEFT UE ACED  CNS    NAD  Lines: LEFT MEDIPORT    Radiology:  Laboratory and Tests Review:  Lab Results   Component Value Date    WBC 6.8 10/13/2020    WBC 7.7 10/11/2020    WBC 6.8 10/10/2020    HGB 9.5 (L) 10/13/2020    HCT 30.7 (L) 10/13/2020    MCV 98.7 10/13/2020     10/13/2020     No results found for: [facility]  Lab Results   Component Value Date    ALT 37 (H) 10/15/2020    AST 26 10/15/2020    ALKPHOS 98 10/15/2020    BILITOT <0.2 10/15/2020     Lab Results   Component Value Date     10/15/2020    K 4.7 10/15/2020    K 3.7 10/11/2020     10/15/2020    CO2 21 10/15/2020    BUN 61 10/15/2020    CREATININE 7.5 10/15/2020    CREATININE 7.6 10/14/2020    CREATININE 7.5 10/14/2020    GFRAA 8 10/15/2020    LABGLOM 8 10/15/2020    GLUCOSE 190 10/15/2020    GLUCOSE 130 2012    PROT 5.3 10/15/2020    LABALBU 2.9 10/15/2020    LABALBU 4.1 2012    CALCIUM 8.8 10/15/2020    BILITOT <0.2 10/15/2020    ALKPHOS 98 10/15/2020    AST 26 10/15/2020    ALT 37 10/15/2020     Lab Results   Component Value Date    CRP 43.1 (H) 2019    CRP 0.3 10/02/2019    CRP 0.4 2017 Lab Results   Component Value Date    SEDRATE 19 10/13/2020    SEDRATE 135 (H) 11/01/2019    SEDRATE 14 09/28/2017       Microbiology:   No results for input(s): COVID19 in the last 72 hours. Lab Results   Component Value Date    BLOODCULT2 5 Days no growth 07/18/2020    BLOODCULT2 5 Days- no growth 02/09/2020    BLOODCULT2 5 Days- no growth 02/08/2020    ORG Staphylococcus epidermidis 10/08/2020    ORG Staphylococcus epidermidis 10/08/2020    ORG Enterococcus faecalis 10/08/2020     WOUND/ABSCESS   Date Value Ref Range Status   12/11/2014 (A)  Final    Mixed yulisa also isolated, including:  Alpha hemolytic Strep species  Corynebacteria     12/11/2014 Heavy growth  Final     No results found for: RESPSMEAR  No results found for: MPNEUMO, CLAMYDCU, LABLEGI, AFBCX, FUNGSM, LABFUNG    MRSA Culture Only   Date Value Ref Range Status   10/30/2019 Methicillin resistant Staph aureus not isolated  Final        ASSESSMENT/PLAN:  [facility]     E. faecalis bacteruria   CKD PD CATH  S/p fall   -most likely line infection  -f/u blood cx  Cefepime   Esr/crp YULISSA     cefepime (MAXIPIME) 1 g IVPB extended (mini-bag), Q24H WOULD STOP AFTER 8 DAYS     vancomycin (VANCOCIN) intermittent dosing (placeholder), RX Placeholder         · Monitor labs    Imaging and labs were reviewed per medical records. The patient was educated about the diagnosis, prognosis, indications, risks and benefits of treatment. An opportunity to ask questions was given to the patient/FAMILY. Thank you for involving me in the care of [address] [place] [address] I will continue to follow. Please do not hesitate to call for any questions or concerns.     Electronically signed by Mani Hicks MD on 10/15/2020 at 9:46 AM

## 2020-10-15 NOTE — PROGRESS NOTES
CCPD tx completed without complication using aseptic technique. Pt tolerated tx well. Fluid removed -1237ml. Effluent clear, pale yellow. No fibrin noted. Exit site dressing D&I. Catheter clamped and secured. Stay safe applied. Pt has no c/o at this time. Report given to staff RN.  Pt stable

## 2020-10-15 NOTE — PROGRESS NOTES
WBCs.    IMPRESSION/RECOMMENDATIONS:      1. ESRD on peritoneal dialysis/CCPD. To continue same prescription. 2. Hyperkalemia 2/2 excess intake and lack of insulin; she has been given 10 units of lantus and 9 units of humalog this morning- will recheck BMP in 2 hours. 3. Coagulase-negative Staphylococcus bacteremia, probably source MediPort, on vancomycin  4. E faecalis bacteriuria  5. HTN, on metoprolol and diltiazem  6. Type I DM, glucose levels improved  7. Left fifth metacarpal fracture, status post fall  8. Left knee effusion, traumatic  9.  Anemia of CKD, on epoetin alpha 8,000 units tiw    Plan:    · Low potassium diet  · BMP daily  · Continue CCPD  · SPA 25 gm TID x 6 doses  · Continue Bumex 2 mg twice daily  · Continue metolazone 5 mg daily  · Continue diltiazem 240 mg daily  · Continue lisinopril 20 mg daily  · Continue metoprolol 100 mg twice a day    Electronically signed by Madhuri Bhandari MD on 10/15/2020 at 10:28 AM

## 2020-10-15 NOTE — PLAN OF CARE
Problem: Pain:  Goal: Pain level will decrease  Description: Pain level will decrease  10/15/2020 0120 by Brayden Young RN  Outcome: Met This Shift  10/14/2020 1647 by Ottoniel Muse  Outcome: Met This Shift  Goal: Control of acute pain  Description: Control of acute pain  10/15/2020 0120 by Brayden Young RN  Outcome: Met This Shift  10/14/2020 1647 by Ottoniel Muse  Outcome: Met This Shift  Goal: Control of chronic pain  Description: Control of chronic pain  10/15/2020 0120 by Brayden Young RN  Outcome: Met This Shift  10/14/2020 1647 by Ottoniel Muse  Outcome: Ongoing     Problem: Falls - Risk of:  Goal: Will remain free from falls  Description: Will remain free from falls  10/15/2020 0120 by Brayden Young RN  Outcome: Met This Shift  10/14/2020 1647 by Ottoniel Muse  Outcome: Met This Shift  Goal: Absence of physical injury  Description: Absence of physical injury  10/15/2020 0120 by Brayden Young RN  Outcome: Met This Shift  10/14/2020 1647 by Ottoniel Muse  Outcome: Met This Shift     Problem: Serum Glucose Level - Abnormal:  Goal: Ability to maintain appropriate glucose levels will improve  Description: Ability to maintain appropriate glucose levels will improve  10/15/2020 0120 by Brayden Young RN  Outcome: Met This Shift  10/14/2020 1647 by Ottoniel Muse  Outcome: Met This Shift     Problem: Sensory Perception - Impaired:  Goal: Ability to maintain a stable neurologic state will improve  Description: Ability to maintain a stable neurologic state will improve  10/15/2020 0120 by Brayden Young RN  Outcome: Met This Shift  10/14/2020 1647 by Ottoniel Muse  Outcome: Met This Shift

## 2020-10-15 NOTE — PROGRESS NOTES
Pharmacy Consultation Note  (Antibiotic Dosing and Monitoring)    Initial consult date: 10/10/20  Consulting physician: Dr. Karine Laureano  Drug(s): Vancomycin  Indication: Positive blood culture    Ht Readings from Last 1 Encounters:   10/10/20 5' 4\" (1.626 m)     Wt Readings from Last 1 Encounters:   10/14/20 127 lb 6.8 oz (57.8 kg)         Age/  Gender IBW DW  Allergy Information   31 y.o.     female 54.7 kg 58.1 kg  Toradol [ketorolac tromethamine]                 Date  WBC CCPD Drug/Dose Time   Given Level(s)   (Time) Comments   10/10  (#1) 6.8 QHS  Vancomycin 1,000 mg IV x 1 1822     10/11  (#2) 7.7 QHS No dose -- 20.0 mcg/mL @ 1324    10/12  (#3) -- QHS No dose -- 16.4 mcg/mL @ 1511    10/13  (#4) 6.8 QHS Vancomycin 500 mg IV once 1703 14.1 mcg/mL @ 1735    10/14  (#5) -- QHS No dose --     10/15  (#6) -- QHS Vancomycin 750 mg IV once <1900> 10.1 mcg/mL @ 1655      (#7)           Estimated Creatinine Clearance: 10 mL/min (A) (based on SCr of 7.5 mg/dL (H)). UOP over the past 24 hours:       Intake/Output Summary (Last 24 hours) at 10/15/2020 1754  Last data filed at 10/15/2020 1710  Gross per 24 hour   Intake 540 ml   Output 1237 ml   Net -697 ml       Temp max: Temp (24hrs), Av.2 °F (36.2 °C), Min:97 °F (36.1 °C), Max:97.5 °F (36.4 °C)      Antibiotic Regimen: No other antibiotics at this time  Antibiotic Dose Date Initiated   Cefepime 500 mg IV Q48H 10/13     Cultures:  available culture and sensitivity results were reviewed in EPIC  Cultures sent and are pending.   Culture Date Result    Urine 10/8 E faecalis   Blood #1 10/8 Methicillin-resistant CoNS   Blood #2 10/8 Methicillin-resistant CoNS   Blood #3 10/12 NGTD   Blood #4 10/13 NGTD   MRSA Nares 10/13 Not isolated   C diff 10/13 Toxin A/B not detected   Peritoneal Body Fluid Cx 10/13 NGTD     Assessment:  · Consulted by Dr. Karine Laureano to dose/monitor vancomycin  · Goal trough level:  15-20 mcg/mL  · Pt is a 30 yof admitted to the hospital for

## 2020-10-15 NOTE — CARE COORDINATION
10/15/2020 No Covid testing. Cm transition of care: pt amy . She is requesting a letter for her mom to take to a meeting for her [employer] housing appointment today- provided this letter. Pt has a mediport and she has a peritoneal dialysis port. [place] is for 6 weeks of IV vanco  at discharge. CM/SS to follow.  Electronically signed by TOR Guevara on 10/14/2020 at 9:45 AM

## 2020-10-16 PROBLEM — E87.5 HYPERKALEMIA: Status: ACTIVE | Noted: 2020-10-16

## 2020-10-16 LAB
ALBUMIN SERPL-MCNC: 3.4 G/DL (ref 3.5–5.2)
ALP BLD-CCNC: 118 U/L (ref 35–104)
ALT SERPL-CCNC: 48 U/L (ref 0–32)
ANION GAP SERPL CALCULATED.3IONS-SCNC: 16 MMOL/L (ref 7–16)
ANION GAP SERPL CALCULATED.3IONS-SCNC: 17 MMOL/L (ref 7–16)
AST SERPL-CCNC: 56 U/L (ref 0–31)
BILIRUB SERPL-MCNC: <0.2 MG/DL (ref 0–1.2)
BODY FLUID CULTURE, STERILE: NORMAL
BUN BLDV-MCNC: 67 MG/DL (ref 6–20)
BUN BLDV-MCNC: 69 MG/DL (ref 6–20)
CALCIUM SERPL-MCNC: 8.6 MG/DL (ref 8.6–10.2)
CALCIUM SERPL-MCNC: 9 MG/DL (ref 8.6–10.2)
CHLORIDE BLD-SCNC: 101 MMOL/L (ref 98–107)
CHLORIDE BLD-SCNC: 98 MMOL/L (ref 98–107)
CO2: 20 MMOL/L (ref 22–29)
CO2: 21 MMOL/L (ref 22–29)
CREAT SERPL-MCNC: 7.7 MG/DL (ref 0.5–1)
CREAT SERPL-MCNC: 7.7 MG/DL (ref 0.5–1)
GFR AFRICAN AMERICAN: 8
GFR AFRICAN AMERICAN: 8
GFR NON-AFRICAN AMERICAN: 8 ML/MIN/1.73
GFR NON-AFRICAN AMERICAN: 8 ML/MIN/1.73
GLUCOSE BLD-MCNC: 386 MG/DL (ref 74–99)
GLUCOSE BLD-MCNC: 488 MG/DL (ref 74–99)
GRAM STAIN RESULT: NORMAL
HCT VFR BLD CALC: 30.4 % (ref 34–48)
HEMOGLOBIN: 9.1 G/DL (ref 11.5–15.5)
MCH RBC QN AUTO: 30.5 PG (ref 26–35)
MCHC RBC AUTO-ENTMCNC: 29.9 % (ref 32–34.5)
MCV RBC AUTO: 102 FL (ref 80–99.9)
METER GLUCOSE: 238 MG/DL (ref 74–99)
METER GLUCOSE: 387 MG/DL (ref 74–99)
METER GLUCOSE: 490 MG/DL (ref 74–99)
METER GLUCOSE: 77 MG/DL (ref 74–99)
PDW BLD-RTO: 20.6 FL (ref 11.5–15)
PLATELET # BLD: 267 E9/L (ref 130–450)
PMV BLD AUTO: 10.7 FL (ref 7–12)
POTASSIUM SERPL-SCNC: 4.8 MMOL/L (ref 3.5–5)
POTASSIUM SERPL-SCNC: 6.3 MMOL/L (ref 3.5–5)
RBC # BLD: 2.98 E12/L (ref 3.5–5.5)
SODIUM BLD-SCNC: 135 MMOL/L (ref 132–146)
SODIUM BLD-SCNC: 138 MMOL/L (ref 132–146)
TOTAL PROTEIN: 5.6 G/DL (ref 6.4–8.3)
VANCOMYCIN RANDOM: 24.6 MCG/ML (ref 5–40)
WBC # BLD: 7.3 E9/L (ref 4.5–11.5)

## 2020-10-16 PROCEDURE — 6360000002 HC RX W HCPCS: Performed by: NURSE PRACTITIONER

## 2020-10-16 PROCEDURE — 2580000003 HC RX 258: Performed by: SPECIALIST

## 2020-10-16 PROCEDURE — 80048 BASIC METABOLIC PNL TOTAL CA: CPT

## 2020-10-16 PROCEDURE — 2580000003 HC RX 258: Performed by: INTERNAL MEDICINE

## 2020-10-16 PROCEDURE — 99232 SBSQ HOSP IP/OBS MODERATE 35: CPT | Performed by: INTERNAL MEDICINE

## 2020-10-16 PROCEDURE — 6360000002 HC RX W HCPCS: Performed by: INTERNAL MEDICINE

## 2020-10-16 PROCEDURE — 1200000000 HC SEMI PRIVATE

## 2020-10-16 PROCEDURE — 85027 COMPLETE CBC AUTOMATED: CPT

## 2020-10-16 PROCEDURE — 6370000000 HC RX 637 (ALT 250 FOR IP): Performed by: INTERNAL MEDICINE

## 2020-10-16 PROCEDURE — 36415 COLL VENOUS BLD VENIPUNCTURE: CPT

## 2020-10-16 PROCEDURE — 90945 DIALYSIS ONE EVALUATION: CPT

## 2020-10-16 PROCEDURE — 82962 GLUCOSE BLOOD TEST: CPT

## 2020-10-16 PROCEDURE — 80202 ASSAY OF VANCOMYCIN: CPT

## 2020-10-16 PROCEDURE — 80053 COMPREHEN METABOLIC PANEL: CPT

## 2020-10-16 PROCEDURE — 6360000002 HC RX W HCPCS: Performed by: SPECIALIST

## 2020-10-16 PROCEDURE — 36592 COLLECT BLOOD FROM PICC: CPT

## 2020-10-16 PROCEDURE — P9047 ALBUMIN (HUMAN), 25%, 50ML: HCPCS | Performed by: INTERNAL MEDICINE

## 2020-10-16 PROCEDURE — 6370000000 HC RX 637 (ALT 250 FOR IP): Performed by: SPECIALIST

## 2020-10-16 RX ORDER — DEXTROSE MONOHYDRATE 25 G/50ML
25 INJECTION, SOLUTION INTRAVENOUS ONCE
Status: COMPLETED | OUTPATIENT
Start: 2020-10-16 | End: 2020-10-16

## 2020-10-16 RX ORDER — OXYCODONE HYDROCHLORIDE 5 MG/1
5 TABLET ORAL EVERY 4 HOURS PRN
Status: DISCONTINUED | OUTPATIENT
Start: 2020-10-16 | End: 2020-10-22

## 2020-10-16 RX ORDER — OXYCODONE HYDROCHLORIDE 5 MG/1
10 TABLET ORAL EVERY 4 HOURS PRN
Status: DISCONTINUED | OUTPATIENT
Start: 2020-10-16 | End: 2020-10-22

## 2020-10-16 RX ADMIN — LEVOTHYROXINE SODIUM 50 MCG: 50 TABLET ORAL at 05:36

## 2020-10-16 RX ADMIN — HEPARIN SODIUM 5000 UNITS: 5000 INJECTION INTRAVENOUS; SUBCUTANEOUS at 05:36

## 2020-10-16 RX ADMIN — DILTIAZEM HYDROCHLORIDE 240 MG: 240 CAPSULE, COATED, EXTENDED RELEASE ORAL at 08:26

## 2020-10-16 RX ADMIN — ALBUMIN (HUMAN) 25 G: 0.25 INJECTION, SOLUTION INTRAVENOUS at 04:00

## 2020-10-16 RX ADMIN — ALBUMIN (HUMAN) 25 G: 0.25 INJECTION, SOLUTION INTRAVENOUS at 17:04

## 2020-10-16 RX ADMIN — HEPARIN SODIUM 5000 UNITS: 5000 INJECTION INTRAVENOUS; SUBCUTANEOUS at 13:35

## 2020-10-16 RX ADMIN — METOLAZONE 5 MG: 2.5 TABLET ORAL at 08:30

## 2020-10-16 RX ADMIN — INSULIN LISPRO 3 UNITS: 100 INJECTION, SOLUTION INTRAVENOUS; SUBCUTANEOUS at 11:43

## 2020-10-16 RX ADMIN — INSULIN HUMAN 4 UNITS: 100 INJECTION, SOLUTION PARENTERAL at 09:08

## 2020-10-16 RX ADMIN — Medication 1 CAPSULE: at 21:02

## 2020-10-16 RX ADMIN — Medication 100 UNITS: at 21:57

## 2020-10-16 RX ADMIN — ALBUMIN (HUMAN) 25 G: 0.25 INJECTION, SOLUTION INTRAVENOUS at 11:43

## 2020-10-16 RX ADMIN — DEXTROSE MONOHYDRATE 25 G: 25 INJECTION, SOLUTION INTRAVENOUS at 09:08

## 2020-10-16 RX ADMIN — INSULIN LISPRO 2 UNITS: 100 INJECTION, SOLUTION INTRAVENOUS; SUBCUTANEOUS at 21:03

## 2020-10-16 RX ADMIN — INSULIN LISPRO 6 UNITS: 100 INJECTION, SOLUTION INTRAVENOUS; SUBCUTANEOUS at 08:31

## 2020-10-16 RX ADMIN — INSULIN LISPRO 3 UNITS: 100 INJECTION, SOLUTION INTRAVENOUS; SUBCUTANEOUS at 08:32

## 2020-10-16 RX ADMIN — GABAPENTIN 100 MG: 100 CAPSULE ORAL at 21:02

## 2020-10-16 RX ADMIN — METOPROLOL TARTRATE 50 MG: 50 TABLET, FILM COATED ORAL at 08:26

## 2020-10-16 RX ADMIN — INSULIN GLARGINE 10 UNITS: 100 INJECTION, SOLUTION SUBCUTANEOUS at 08:31

## 2020-10-16 RX ADMIN — Medication 300 UNITS: at 08:26

## 2020-10-16 RX ADMIN — Medication 1 CAPSULE: at 08:26

## 2020-10-16 RX ADMIN — ATORVASTATIN CALCIUM 40 MG: 40 TABLET, FILM COATED ORAL at 21:02

## 2020-10-16 RX ADMIN — OXYCODONE 10 MG: 5 TABLET ORAL at 17:03

## 2020-10-16 RX ADMIN — GABAPENTIN 100 MG: 100 CAPSULE ORAL at 13:35

## 2020-10-16 RX ADMIN — EPOETIN ALFA-EPBX 8000 UNITS: 4000 INJECTION, SOLUTION INTRAVENOUS; SUBCUTANEOUS at 09:01

## 2020-10-16 RX ADMIN — HYDROCODONE BITARTRATE AND ACETAMINOPHEN 2 TABLET: 5; 325 TABLET ORAL at 00:32

## 2020-10-16 RX ADMIN — HEPARIN SODIUM 5000 UNITS: 5000 INJECTION INTRAVENOUS; SUBCUTANEOUS at 21:03

## 2020-10-16 RX ADMIN — Medication 10 ML: at 08:27

## 2020-10-16 RX ADMIN — OXYCODONE 10 MG: 5 TABLET ORAL at 11:42

## 2020-10-16 RX ADMIN — HYDROCODONE BITARTRATE AND ACETAMINOPHEN 2 TABLET: 5; 325 TABLET ORAL at 08:25

## 2020-10-16 RX ADMIN — METOPROLOL TARTRATE 50 MG: 50 TABLET, FILM COATED ORAL at 21:02

## 2020-10-16 RX ADMIN — CEFEPIME HYDROCHLORIDE 1 G: 1 INJECTION, POWDER, FOR SOLUTION INTRAMUSCULAR; INTRAVENOUS at 17:04

## 2020-10-16 RX ADMIN — GABAPENTIN 100 MG: 100 CAPSULE ORAL at 08:25

## 2020-10-16 ASSESSMENT — PAIN SCALES - GENERAL
PAINLEVEL_OUTOF10: 7
PAINLEVEL_OUTOF10: 8
PAINLEVEL_OUTOF10: 8
PAINLEVEL_OUTOF10: 10
PAINLEVEL_OUTOF10: 7
PAINLEVEL_OUTOF10: 6
PAINLEVEL_OUTOF10: 8

## 2020-10-16 ASSESSMENT — PAIN DESCRIPTION - PAIN TYPE: TYPE: ACUTE PAIN

## 2020-10-16 ASSESSMENT — PAIN DESCRIPTION - LOCATION: LOCATION: HEAD

## 2020-10-16 NOTE — PROGRESS NOTES
[address] [facility]  [facility]  Progress Note    NAME:Wilton Flores  1990  DATE OF SERVICE:10/16/20    Pt was seen FACE TO FACE FOR atbx    SUBJECTIVE:  Chief Complaint   Patient presents with    Hyperglycemia     blood sugar high today vomiting back pain     In bed wants ortho to see for her lue fx  Afebrile   More awake  Patient is tolerating medications. No reported adverse drug reactions. Review of systems:  As stated above in the chief complaint, otherwise negative.     Medications:  Scheduled Meds:   lactobacillus  1 capsule Oral Q12H    albumin human  25 g Intravenous Q8H    metoprolol  50 mg Oral BID    cefepime  1 g Intravenous Q24H    sodium chloride  1,000 mL Intravenous Once    sodium chloride  1,000 mL Intravenous Once    lidocaine  5 mL Intradermal Once    heparin flush  3 mL Intravenous 2 times per day    lidocaine  5 mL Intradermal Once    heparin flush  1 mL Intravenous 2 times per day    vancomycin (VANCOCIN) intermittent dosing (placeholder)   Other RX Placeholder    insulin lispro  0-6 Units Subcutaneous TID WC    insulin lispro  0-3 Units Subcutaneous Nightly    epoetin nena-epbx  8,000 Units Subcutaneous Once per day on Mon Wed Fri    insulin glargine  10 Units Subcutaneous QAM    [Held by provider] insulin NPH  5 Units Subcutaneous Nightly    insulin lispro  3 Units Subcutaneous TID WC    sodium chloride flush  10 mL Intravenous 2 times per day    atorvastatin  40 mg Oral Nightly    dilTIAZem  240 mg Oral Daily    gabapentin  100 mg Oral TID    levothyroxine  50 mcg Oral Daily    metOLazone  5 mg Oral Daily    sodium chloride flush  10 mL Intravenous 2 times per day    heparin (porcine)  5,000 Units Subcutaneous 3 times per day     Continuous Infusions:   sodium chloride Stopped (10/16/20 0015)    dextrose Stopped (10/13/20 1304)     PRN Meds:sodium chloride flush, heparin flush, sodium chloride flush, heparin flush, HYDROcodone 5 mg - acetaminophen **OR** HYDROcodone 5 mg - acetaminophen, acetaminophen, cloNIDine, LORazepam, acetaminophen **OR** acetaminophen, magnesium hydroxide, promethazine **OR** ondansetron, glucose, dextrose, glucagon (rDNA), dextrose    OBJECTIVE:  /68   Pulse 90   Temp 98 °F (36.7 °C)   Resp 16   Ht 5' 4\" (1.626 m)   Wt 125 lb 3.5 oz (56.8 kg)   SpO2 92%   BMI 21.49 kg/m²   Temp  Av.8 °F (36.6 °C)  Min: 97 °F (36.1 °C)  Max: 98.7 °F (37.1 °C)  Constitutional:  The patient is awake, ALERT  Skin:    Warm and dry. No rashes were noted. HEENT:      AT/NC. Chest:   No use of accessory muscles to breathe. Symmetrical expansion. Cardiovascular:  S1 and S2 are rhythmic and regular. No murmurs appreciated. Abdomen:   Positive bowel sounds to auscultation. Benign to palpation. Pd CATH   Extremities:    edema.  LEFT UE ACED  CNS    NAD  Lines: LEFT MEDIPORT ru epicc dried blood    Radiology:  Laboratory and Tests Review:  Lab Results   Component Value Date    WBC 7.3 10/16/2020    WBC 6.8 10/13/2020    WBC 7.7 10/11/2020    HGB 9.1 (L) 10/16/2020    HCT 30.4 (L) 10/16/2020    .0 (H) 10/16/2020     10/16/2020     No results found for: [facility]  Lab Results   Component Value Date    ALT 48 (H) 10/16/2020    AST 56 (H) 10/16/2020    ALKPHOS 118 (H) 10/16/2020    BILITOT <0.2 10/16/2020     Lab Results   Component Value Date     10/16/2020    K 6.3 10/16/2020    K 3.7 10/11/2020    CL 98 10/16/2020    CO2 21 10/16/2020    BUN 69 10/16/2020    CREATININE 7.7 10/16/2020    CREATININE 7.5 10/15/2020    CREATININE 7.6 10/14/2020    GFRAA 8 10/16/2020    LABGLOM 8 10/16/2020    GLUCOSE 488 10/16/2020    GLUCOSE 130 2012    PROT 5.6 10/16/2020    LABALBU 3.4 10/16/2020    LABALBU 4.1 2012    CALCIUM 8.6 10/16/2020    BILITOT <0.2 10/16/2020    ALKPHOS 118 10/16/2020    AST 56 10/16/2020    ALT 48 10/16/2020     Lab Results   Component Value Date    CRP 43.1 (H) 2019    CRP 0.3 10/02/2019    CRP 0.4 09/28/2017     Lab Results   Component Value Date    SEDRATE 19 10/13/2020    SEDRATE 135 (H) 11/01/2019    SEDRATE 14 09/28/2017       Microbiology:   No results for input(s): COVID19 in the last 72 hours. Lab Results   Component Value Date    BLOODCULT2 5 Days no growth 07/18/2020    BLOODCULT2 5 Days- no growth 02/09/2020    BLOODCULT2 5 Days- no growth 02/08/2020    ORG Staphylococcus epidermidis 10/08/2020    ORG Staphylococcus epidermidis 10/08/2020    ORG Enterococcus faecalis 10/08/2020     WOUND/ABSCESS   Date Value Ref Range Status   12/11/2014 (A)  Final    Mixed yulisa also isolated, including:  Alpha hemolytic Strep species  Corynebacteria     12/11/2014 Heavy growth  Final     No results found for: RESPSMEAR  No results found for: MPNEUMO, CLAMYDCU, LABLEGI, AFBCX, FUNGSM, LABFUNG    MRSA Culture Only   Date Value Ref Range Status   10/13/2020 Methicillin resistant Staph aureus not isolated  Final        ASSESSMENT/PLAN:  Cons bacteremia has left MediPort     E. faecalis bacteruria   CKD PD CATH  S/p fall -  traumatic nondisplaced fracture involving the 5th    metacarpal neck     Wants ortho to see  -most likely line infection  -f/u blood cx  Cefepime   Esr/crp YULISSA     cefepime (MAXIPIME) 1 g IVPB extended (mini-bag), Q24H WOULD STOP AFTER 8 DAYS     vancomycin (VANCOCIN) intermittent dosing (placeholder), RX Placeholder     YULISSA      Change picc dressing     · Monitor labs    Imaging and labs were reviewed per medical records. The patient was educated about the diagnosis, prognosis, indications, risks and benefits of treatment. An opportunity to ask questions was given to the patient/FAMILY. Thank you for involving me in the care of [address] [place] [address] I will continue to follow. Please do not hesitate to call for any questions or concerns.     Electronically signed by Ambrosio Goodwin MD on 10/16/2020 at 10:36 AM

## 2020-10-16 NOTE — PROGRESS NOTES
PRN  acetaminophen, 650 mg, Q4H PRN  cloNIDine, 0.1 mg, BID PRN  LORazepam, 0.5 mg, BID PRN  acetaminophen, 650 mg, Q6H PRN    Or  acetaminophen, 650 mg, Q6H PRN  magnesium hydroxide, 30 mL, Daily PRN  promethazine, 12.5 mg, Q6H PRN    Or  ondansetron, 4 mg, Q6H PRN  glucose, 15 g, PRN  dextrose, 12.5 g, PRN  glucagon (rDNA), 1 mg, PRN  dextrose, 100 mL/hr, PRN         Objective:    /68   Pulse 90   Temp 98 °F (36.7 °C)   Resp 16   Ht 5' 4\" (1.626 m)   Wt 125 lb 3.5 oz (56.8 kg)   SpO2 92%   BMI 21.49 kg/m²   General Appearance: alert and oriented to person, place and time and in no acute distress  Skin: warm and dry  Head: normocephalic and atraumatic  Eyes: pupils equal, round, and reactive to light, extraocular eye movements intact, conjunctivae normal  Neck: neck supple and non tender without mass   Pulmonary/Chest: clear to auscultation bilaterally- no wheezes, rales or rhonchi, normal air movement, no respiratory distress  Cardiovascular: normal rate, normal S1 and S2 and no carotid bruits  Abdomen: soft, non-tender, non-distended, normal bowel sounds, no masses or organomegaly. Peritoneal dialysis catheter in place without surrounding erythema or drainage  Extremities: Left upper extremity in gutter splint.   Sensation and finger movement intact  Neurologic: no cranial nerve deficit and speech normal      Recent Labs     10/14/20  1136 10/15/20  0533 10/16/20  0545    140 135   K 5.3* 4.7 6.3*    104 98   CO2 20* 21* 21*   BUN 60* 61* 69*   CREATININE 7.6* 7.5* 7.7*   GLUCOSE 334* 190* 488*   CALCIUM 8.6 8.8 8.6       Recent Labs     10/14/20  0629 10/15/20  0533 10/16/20  0545   ALKPHOS 113* 98 118*   PROT 5.5* 5.3* 5.6*   LABALBU 3.0* 2.9* 3.4*   BILITOT <0.2 <0.2 <0.2   AST 41* 26 56*   ALT 43* 37* 48*       Recent Labs     10/16/20  0545   WBC 7.3   RBC 2.98*   HGB 9.1*   HCT 30.4*   .0*   MCH 30.5   MCHC 29.9*   RDW 20.6*      MPV 10.7       CBC:   Lab Results Component Value Date    WBC 7.3 10/16/2020    RBC 2.98 10/16/2020    HGB 9.1 10/16/2020    HCT 30.4 10/16/2020    .0 10/16/2020    MCH 30.5 10/16/2020    MCHC 29.9 10/16/2020    RDW 20.6 10/16/2020     10/16/2020    MPV 10.7 10/16/2020     BMP:    Lab Results   Component Value Date     10/16/2020    K 6.3 10/16/2020    K 3.7 10/11/2020    CL 98 10/16/2020    CO2 21 10/16/2020    BUN 69 10/16/2020    LABALBU 3.4 10/16/2020    LABALBU 4.1 05/18/2012    CREATININE 7.7 10/16/2020    CALCIUM 8.6 10/16/2020    GFRAA 8 10/16/2020    LABGLOM 8 10/16/2020    GLUCOSE 488 10/16/2020    GLUCOSE 130 05/18/2012        Radiology:   US DUP LOWER EXTREMITIES BILATERAL VENOUS   Final Result   No evidence of DVT in either lower extremity. XR CHEST PORTABLE   Final Result   1. No active pulmonary disease. CT HEAD WO CONTRAST   Final Result   1. No acute intracranial abnormality. XR HAND LEFT (MIN 3 VIEWS)   Final Result   Addendum 1 of 1   ADDENDUM:   Recommend clinical correlation for point tenderness involving the 5th   metacarpal neck. Final      XR KNEE LEFT (3 VIEWS)   Final Result   1. No acute abnormality involving the left knee. XR CHEST PORTABLE   Final Result   No evidence of pneumonia or pleural effusion. Assessment/Plan:  Principal Problem:    Uncontrolled type 1 diabetes mellitus with hyperglycemia, with long-term current use of insulin (HCC)  Active Problems:    Hypertension    Severe protein-calorie malnutrition (HCC)    ESRD on peritoneal dialysis ([facility] [address]    Hypothyroidism    Hyperlipidemia    Acute cystitis without hematuria    Hyperglycemia    Nondisplaced fracture of neck of fifth metacarpal bone, left hand, initial encounter for closed fracture    Acute encephalopathy    Effusion of left knee  Resolved Problems:    * No resolved hospital problems. *      1.   Acute metabolic encephalopathy  -Resolved  -Likely due to prolonged hypoglycemia and

## 2020-10-16 NOTE — PROGRESS NOTES
Pharmacy Consultation Note  (Antibiotic Dosing and Monitoring)    Initial consult date: 10/10/20  Consulting physician: Dr. Samuel Howard  Drug(s): Vancomycin  Indication: Positive blood culture    Ht Readings from Last 1 Encounters:   10/16/20 5' 4\" (1.626 m)     Wt Readings from Last 1 Encounters:   10/16/20 125 lb 3.5 oz (56.8 kg)         Age/  Gender IBW DW  Allergy Information   31 y.o.     female 54.7 kg 58.1 kg  Toradol [ketorolac tromethamine]                 Date  WBC CCPD Drug/Dose Time   Given Level(s)   (Time) Comments   10/10  (#1) 6.8 QHS  Vancomycin 1,000 mg IV x 1 1822     10/11  (#2) 7.7 QHS No dose -- 20.0 mcg/mL @ 1324    10/12  (#3) -- QHS No dose -- 16.4 mcg/mL @ 1511    10/13  (#4) 6.8 QHS Vancomycin 500 mg IV once 1703 14.1 mcg/mL @ 5301    10/14  (#5) -- QHS No dose --     10/15  (#6) -- QHS Vancomycin 750 mg IV once 1843 10.1 mcg/mL @ 1655    10/16  (#7) 7.3 QHS No dose  24.6 mcg/mL @ 1400      Estimated Creatinine Clearance: 9 mL/min (A) (based on SCr of 7.7 mg/dL (H)). UOP over the past 24 hours:       Intake/Output Summary (Last 24 hours) at 10/16/2020 1653  Last data filed at 10/16/2020 1426  Gross per 24 hour   Intake 1120 ml   Output 966 ml   Net 154 ml       Temp max: Temp (24hrs), Av.1 °F (36.7 °C), Min:97.4 °F (36.3 °C), Max:98.7 °F (37.1 °C)      Antibiotic Regimen: No other antibiotics at this time  Antibiotic Dose Date Initiated   Cefepime 500 mg IV Q48H 10/13     Cultures:  available culture and sensitivity results were reviewed in EPIC  Cultures sent and are pending.   Culture Date Result    Urine 10/8 E faecalis   Blood #1 10/8 Methicillin-resistant CoNS   Blood #2 10/8 Methicillin-resistant CoNS   Blood #3 10/12 NGTD   Blood #4 10/13 NGTD   MRSA Nares 10/13 Not isolated   C diff 10/13 Toxin A/B not detected   Peritoneal Body Fluid Cx 10/13 NGTD     Assessment:  · Consulted by Dr. Samuel Howard to dose/monitor vancomycin  · Goal trough level:  15-20 mcg/mL  · Pt is a 31 yof admitted to the hospital for hyperglycemia. She is being initiated on vancomycin for a positive blood culture, final c/s pending.   · Wilton is ESRD on PD, cycles nightly, nephro following  · 10/11: Random level @ 1324 = 20.0 mcg/mL  · 10/12: Random level @ 1511 = 16.4 mcg/mL  · 10/13: Random level @ 1548 = 14.1 mcg/mL  · 10/15: Random level @ 1655 = 10.1 mcg/mL    Plan:  · No dose vancomycin today  · Discussed discharge interval with ID; may be able to tolerate 500 mg IV Q24H  · Repeat random level tomorrow afternoon  · Follow nephro notes/PD schedule  · Pharmacist will follow and monitor/adjust dosing as necessary      Thank you for the consult,    Junior Stewart, PharmD 10/16/2020 4:53 PM   [phone]

## 2020-10-16 NOTE — PROGRESS NOTES
Intravenous 2 times per day    heparin (porcine)  5,000 Units Subcutaneous 3 times per day     Continuous Infusions:   sodium chloride Stopped (10/16/20 0015)    dextrose Stopped (10/13/20 1304)     PRN Meds:.oxyCODONE **OR** oxyCODONE, sodium chloride flush, heparin flush, sodium chloride flush, heparin flush, acetaminophen, cloNIDine, LORazepam, acetaminophen **OR** acetaminophen, magnesium hydroxide, promethazine **OR** ondansetron, glucose, dextrose, glucagon (rDNA), dextrose      DATA:    CBC:   Lab Results   Component Value Date    WBC 7.3 10/16/2020    RBC 2.98 10/16/2020    HGB 9.1 10/16/2020    HCT 30.4 10/16/2020    .0 10/16/2020    MCH 30.5 10/16/2020    MCHC 29.9 10/16/2020    RDW 20.6 10/16/2020     10/16/2020    MPV 10.7 10/16/2020     CMP:    Lab Results   Component Value Date     10/16/2020    K 6.3 10/16/2020    K 3.7 10/11/2020    CL 98 10/16/2020    CO2 21 10/16/2020    BUN 69 10/16/2020    CREATININE 7.7 10/16/2020    GFRAA 8 10/16/2020    LABGLOM 8 10/16/2020    GLUCOSE 488 10/16/2020    GLUCOSE 130 05/18/2012    PROT 5.6 10/16/2020    LABALBU 3.4 10/16/2020    LABALBU 4.1 05/18/2012    CALCIUM 8.6 10/16/2020    BILITOT <0.2 10/16/2020    ALKPHOS 118 10/16/2020    AST 56 10/16/2020    ALT 48 10/16/2020     Magnesium:    Lab Results   Component Value Date    MG 2.3 10/14/2020     Phosphorus:    Lab Results   Component Value Date    PHOS 8.9 10/14/2020     Radiology Review:      Chest x-ray October 8, 2020   No evidence of pneumonia or pleural effusion.               BRIEF SUMMARY OF INITIAL CONSULT:    Briefly, Duy Matson is a 31year-old female with history of ESRD on peritoneal dialysis (CCPD), poorly controlled type I DM with multiple admissions for DKA, gastroparesis, HTN  UTI, who was admitted October 8, 2020 after she presented to the ER reporting feeling unwell, having lower back pain and vomiting. In the ER her glucose was 570 mg/dL.   Her urine showed >20 WBCs.    IMPRESSION/RECOMMENDATIONS:      1. ESRD on peritoneal dialysis/CCPD. To continue same prescription. 2. Hyperkalemia 2/2 excess intake and lack of insulin. K 6.3 this morning. · K 6.3 this morning, she has been given 10 units of lantus, 9 units of humalog, 4 units of regular insulin and 1 amp of D50 this morning- will recheck BMP at 1100.    3. Coagulase-negative Staphylococcus bacteremia, probably source MediPort, on vancomycin and cefepime; for YULISSA  4. E faecalis bacteriuria  5. HTN, on metoprolol and diltiazem  6. Type I DM, glucose levels improved  7. Left fifth metacarpal fracture, status post fall  8. Left knee effusion, traumatic  9.  Anemia of CKD, on epoetin alpha 8,000 units tiw    Plan:    · Continue low potassium diet  · BMP at 1100  · Continue CCPD  · Continue albumin 25 g IV Q8 hours x 6 doses   · Continue Bumex 2 mg twice daily  · Continue metolazone 5 mg daily  · Continue diltiazem 240 mg daily  · Continue lisinopril 20 mg daily  · Continue metoprolol 100 mg twice a day  · Continue to monitor CMP daily    Electronically signed by HEBER Fonseca CNP on 10/16/2020 at 11:10 AM

## 2020-10-16 NOTE — PROGRESS NOTES
Comprehensive Nutrition Assessment    Type and Reason for Visit:  Reassess    Nutrition Recommendations/Plan: Continue current diet    Nutrition Assessment:  Pt improving from a nutritional standpoint 2/2 good intakes. Pt remains at nutritional risk d/t pt on ESRD on PD. Pt w/ L hand fx from fall this admission. Pending YULISSA planned for Monday. ONS not appropriate at this time d/t fluid restriction, will continue to monitor. Malnutrition Assessment:  Malnutrition Status: At risk for malnutrition (Comment)    Context:  Chronic Illness     Findings of the 6 clinical characteristics of malnutrition:  Energy Intake:  7 - 75% or less estimated energy requirements for 1 month or longer  Weight Loss:  No significant weight loss     Body Fat Loss:  No significant body fat loss     Muscle Mass Loss:  No significant muscle mass loss    Fluid Accumulation:  No significant fluid accumulation     Strength:  Not Performed    Estimated Daily Nutrient Needs:  Energy (kcal):  7127-5042(30-32 kcal/kg); Weight Used for Energy Requirements:  Current     Protein (g):  (1.5-1.8 gm/kg);  Weight Used for Protein Requirements:  Current        Fluid (ml/day):  1200 Fluid restriction; Weight Used for Fluid Requirements:  Current      Nutrition Related Findings:  pt alert, abd WDL, +BS, I/O WNL, no edema, generalized weakness, hyperkalemia, renal labs elevated, hyperglycemia, GI labs elevated, current CCPD regimen provides ~230 kcal/day      Wounds:  None(abrasion noted)       Current Nutrition Therapies:    Diet NPO, After Midnight Exceptions are: Sips with Meds  DIET GENERAL; Carb Control: 4 carb choices (60 gms)/meal; Daily Fluid Restriction: 1200 ml; Low Potassium    Anthropometric Measures:  · Height: 5' 4\" (162.6 cm)  · Current Body Weight: 125 lb 3.5 oz (56.8 kg)(10/16 actual)   · Admission Body Weight: 122 lb (55.3 kg)(10/8 actual)    · Usual Body Weight: 129 lb 3 oz (58.6 kg)(12/26/19 actual per EMR, variable wt hx

## 2020-10-16 NOTE — CARE COORDINATION
10/16/2020 1400 CM note:NO COVID TESTING. Pt with ESRD on peritoneal dialysis. She has a  mediport and midline in place. Pt with positive blood cultures and scheduled for YULISSA on Monday. CM/SW to follow for antibiotic needs.  Mia ALVAREZ

## 2020-10-16 NOTE — PROGRESS NOTES
CCPD COMPLETED WITHOUT PROBLEM. PT DENIES C/O AT THIS TIME. CLEAR YELLOW EFFLUENT NOTED. DRESSING INTACT AND NO DRAINAGE. TOT TIME 008:48; ; TOT VOL; DWELL TIME GAINED 00:47.          /68   Pulse 90   Temp 98 °F (36.7 °C)   Resp 16   Ht 5' 4\" (1.626 m)   Wt 125 lb 3.5 oz (56.8 kg)   SpO2 97%   BMI 21.49 kg/m²

## 2020-10-17 LAB
ALBUMIN SERPL-MCNC: 4.3 G/DL (ref 3.5–5.2)
ALP BLD-CCNC: 105 U/L (ref 35–104)
ALT SERPL-CCNC: 46 U/L (ref 0–32)
ANION GAP SERPL CALCULATED.3IONS-SCNC: 18 MMOL/L (ref 7–16)
AST SERPL-CCNC: 49 U/L (ref 0–31)
BILIRUB SERPL-MCNC: 0.2 MG/DL (ref 0–1.2)
BUN BLDV-MCNC: 62 MG/DL (ref 6–20)
CALCIUM SERPL-MCNC: 9.4 MG/DL (ref 8.6–10.2)
CHLORIDE BLD-SCNC: 100 MMOL/L (ref 98–107)
CO2: 20 MMOL/L (ref 22–29)
CREAT SERPL-MCNC: 7.3 MG/DL (ref 0.5–1)
GFR AFRICAN AMERICAN: 8
GFR NON-AFRICAN AMERICAN: 8 ML/MIN/1.73
GLUCOSE BLD-MCNC: 298 MG/DL (ref 74–99)
METER GLUCOSE: 106 MG/DL (ref 74–99)
METER GLUCOSE: 115 MG/DL (ref 74–99)
METER GLUCOSE: 265 MG/DL (ref 74–99)
METER GLUCOSE: 376 MG/DL (ref 74–99)
METER GLUCOSE: 96 MG/DL (ref 74–99)
POTASSIUM SERPL-SCNC: 4.7 MMOL/L (ref 3.5–5)
SODIUM BLD-SCNC: 138 MMOL/L (ref 132–146)
TOTAL PROTEIN: 6.3 G/DL (ref 6.4–8.3)
VANCOMYCIN RANDOM: 19.6 MCG/ML (ref 5–40)

## 2020-10-17 PROCEDURE — 99232 SBSQ HOSP IP/OBS MODERATE 35: CPT | Performed by: INTERNAL MEDICINE

## 2020-10-17 PROCEDURE — 6360000002 HC RX W HCPCS: Performed by: INTERNAL MEDICINE

## 2020-10-17 PROCEDURE — 2580000003 HC RX 258: Performed by: INTERNAL MEDICINE

## 2020-10-17 PROCEDURE — 6370000000 HC RX 637 (ALT 250 FOR IP): Performed by: INTERNAL MEDICINE

## 2020-10-17 PROCEDURE — 90945 DIALYSIS ONE EVALUATION: CPT | Performed by: INTERNAL MEDICINE

## 2020-10-17 PROCEDURE — 6370000000 HC RX 637 (ALT 250 FOR IP): Performed by: SPECIALIST

## 2020-10-17 PROCEDURE — 36592 COLLECT BLOOD FROM PICC: CPT

## 2020-10-17 PROCEDURE — 80053 COMPREHEN METABOLIC PANEL: CPT

## 2020-10-17 PROCEDURE — 82962 GLUCOSE BLOOD TEST: CPT

## 2020-10-17 PROCEDURE — 36415 COLL VENOUS BLD VENIPUNCTURE: CPT

## 2020-10-17 PROCEDURE — 80202 ASSAY OF VANCOMYCIN: CPT

## 2020-10-17 PROCEDURE — 1200000000 HC SEMI PRIVATE

## 2020-10-17 PROCEDURE — P9047 ALBUMIN (HUMAN), 25%, 50ML: HCPCS | Performed by: INTERNAL MEDICINE

## 2020-10-17 RX ORDER — INSULIN GLARGINE 100 [IU]/ML
12 INJECTION, SOLUTION SUBCUTANEOUS EVERY MORNING
Status: DISCONTINUED | OUTPATIENT
Start: 2020-10-18 | End: 2020-10-24 | Stop reason: HOSPADM

## 2020-10-17 RX ADMIN — Medication 1 CAPSULE: at 22:57

## 2020-10-17 RX ADMIN — Medication 1 CAPSULE: at 09:54

## 2020-10-17 RX ADMIN — METOLAZONE 5 MG: 2.5 TABLET ORAL at 09:52

## 2020-10-17 RX ADMIN — GABAPENTIN 100 MG: 100 CAPSULE ORAL at 14:33

## 2020-10-17 RX ADMIN — HEPARIN SODIUM 5000 UNITS: 5000 INJECTION INTRAVENOUS; SUBCUTANEOUS at 22:56

## 2020-10-17 RX ADMIN — INSULIN LISPRO 5 UNITS: 100 INJECTION, SOLUTION INTRAVENOUS; SUBCUTANEOUS at 14:28

## 2020-10-17 RX ADMIN — OXYCODONE 10 MG: 5 TABLET ORAL at 09:53

## 2020-10-17 RX ADMIN — METOPROLOL TARTRATE 50 MG: 50 TABLET, FILM COATED ORAL at 01:00

## 2020-10-17 RX ADMIN — ALBUMIN (HUMAN) 25 G: 0.25 INJECTION, SOLUTION INTRAVENOUS at 03:25

## 2020-10-17 RX ADMIN — OXYCODONE 10 MG: 5 TABLET ORAL at 16:49

## 2020-10-17 RX ADMIN — ATORVASTATIN CALCIUM 40 MG: 40 TABLET, FILM COATED ORAL at 22:56

## 2020-10-17 RX ADMIN — OXYCODONE 10 MG: 5 TABLET ORAL at 04:26

## 2020-10-17 RX ADMIN — Medication 100 UNITS: at 09:55

## 2020-10-17 RX ADMIN — INSULIN LISPRO 3 UNITS: 100 INJECTION, SOLUTION INTRAVENOUS; SUBCUTANEOUS at 10:00

## 2020-10-17 RX ADMIN — INSULIN LISPRO 3 UNITS: 100 INJECTION, SOLUTION INTRAVENOUS; SUBCUTANEOUS at 09:59

## 2020-10-17 RX ADMIN — Medication 300 UNITS: at 10:03

## 2020-10-17 RX ADMIN — HEPARIN SODIUM 5000 UNITS: 5000 INJECTION INTRAVENOUS; SUBCUTANEOUS at 06:04

## 2020-10-17 RX ADMIN — Medication 100 UNITS: at 22:57

## 2020-10-17 RX ADMIN — INSULIN GLARGINE 10 UNITS: 100 INJECTION, SOLUTION SUBCUTANEOUS at 09:56

## 2020-10-17 RX ADMIN — ONDANSETRON 4 MG: 2 INJECTION INTRAMUSCULAR; INTRAVENOUS at 16:50

## 2020-10-17 RX ADMIN — DILTIAZEM HYDROCHLORIDE 240 MG: 240 CAPSULE, COATED, EXTENDED RELEASE ORAL at 09:53

## 2020-10-17 RX ADMIN — GABAPENTIN 100 MG: 100 CAPSULE ORAL at 22:56

## 2020-10-17 RX ADMIN — METOPROLOL TARTRATE 50 MG: 50 TABLET, FILM COATED ORAL at 22:57

## 2020-10-17 RX ADMIN — OXYCODONE 10 MG: 5 TABLET ORAL at 22:57

## 2020-10-17 RX ADMIN — GABAPENTIN 100 MG: 100 CAPSULE ORAL at 09:52

## 2020-10-17 RX ADMIN — Medication 10 ML: at 22:57

## 2020-10-17 RX ADMIN — LEVOTHYROXINE SODIUM 50 MCG: 50 TABLET ORAL at 06:04

## 2020-10-17 ASSESSMENT — PAIN SCALES - GENERAL
PAINLEVEL_OUTOF10: 7
PAINLEVEL_OUTOF10: 9
PAINLEVEL_OUTOF10: 8
PAINLEVEL_OUTOF10: 2
PAINLEVEL_OUTOF10: 0
PAINLEVEL_OUTOF10: 0

## 2020-10-17 NOTE — PROGRESS NOTES
[facility]  [facility]  Progress Note    NAME:Wilton Flores  1990  DATE OF SERVICE:10/17/20    Pt was seen FACE TO FACE FOR atbx    SUBJECTIVE:  Chief Complaint   Patient presents with    Hyperglycemia     blood sugar high today vomiting back pain     Has c/o diarrhea and abd pain  Afebrile   More awake  Patient is tolerating medications. No reported adverse drug reactions. Review of systems:  As stated above in the chief complaint, otherwise negative.     Medications:  Scheduled Meds:   insulin lispro  5 Units Subcutaneous TID     [START ON 10/18/2020] insulin glargine  12 Units Subcutaneous QAM    lactobacillus  1 capsule Oral Q12H    metoprolol  50 mg Oral BID    cefepime  1 g Intravenous Q24H    sodium chloride  1,000 mL Intravenous Once    sodium chloride  1,000 mL Intravenous Once    lidocaine  5 mL Intradermal Once    heparin flush  3 mL Intravenous 2 times per day    lidocaine  5 mL Intradermal Once    heparin flush  1 mL Intravenous 2 times per day    vancomycin (VANCOCIN) intermittent dosing (placeholder)   Other RX Placeholder    insulin lispro  0-6 Units Subcutaneous TID     insulin lispro  0-3 Units Subcutaneous Nightly    epoetin nena-epbx  8,000 Units Subcutaneous Once per day on Mon Wed Fri    [Held by provider] insulin NPH  5 Units Subcutaneous Nightly    sodium chloride flush  10 mL Intravenous 2 times per day    atorvastatin  40 mg Oral Nightly    dilTIAZem  240 mg Oral Daily    gabapentin  100 mg Oral TID    levothyroxine  50 mcg Oral Daily    metOLazone  5 mg Oral Daily    sodium chloride flush  10 mL Intravenous 2 times per day    heparin (porcine)  5,000 Units Subcutaneous 3 times per day     Continuous Infusions:   sodium chloride Stopped (10/16/20 0015)    dextrose Stopped (10/13/20 1304)     PRN Meds:oxyCODONE **OR** oxyCODONE, sodium chloride flush, heparin flush, sodium chloride flush, heparin flush, acetaminophen, cloNIDine, LORazepam, acetaminophen **OR** acetaminophen, magnesium hydroxide, promethazine **OR** ondansetron, glucose, dextrose, glucagon (rDNA), dextrose    OBJECTIVE:  /85   Pulse 95   Temp 97.7 °F (36.5 °C) (Oral)   Resp 18   Ht 5' 4\" (1.626 m)   Wt 147 lb 7.8 oz (66.9 kg)   SpO2 96%   BMI 25.32 kg/m²   Temp  Av.2 °F (36.8 °C)  Min: 97.7 °F (36.5 °C)  Max: 98.6 °F (37 °C)  Constitutional:  The patient is awake, ALERT  Skin:    Warm and dry. No rashes were noted. HEENT:      AT/NC. Chest:   No use of accessory muscles to breathe. Symmetrical expansion. Cardiovascular:  S1 and S2 are rhythmic and regular. No murmurs appreciated. Abdomen:   Positive bowel sounds to auscultation. Benign to palpation. Pd CATH distended  Extremities:    edema.  LEFT UE ACED  CNS    NAD  Lines: LEFT MEDIPORT ru epic     Radiology:  Laboratory and Tests Review:  Lab Results   Component Value Date    WBC 7.3 10/16/2020    WBC 6.8 10/13/2020    WBC 7.7 10/11/2020    HGB 9.1 (L) 10/16/2020    HCT 30.4 (L) 10/16/2020    .0 (H) 10/16/2020     10/16/2020     No results found for: [facility]  Lab Results   Component Value Date    ALT 46 (H) 10/17/2020    AST 49 (H) 10/17/2020    ALKPHOS 105 (H) 10/17/2020    BILITOT 0.2 10/17/2020     Lab Results   Component Value Date     10/17/2020    K 4.7 10/17/2020    K 3.7 10/11/2020     10/17/2020    CO2 20 10/17/2020    BUN 62 10/17/2020    CREATININE 7.3 10/17/2020    CREATININE 7.7 10/16/2020    CREATININE 7.7 10/16/2020    GFRAA 8 10/17/2020    LABGLOM 8 10/17/2020    GLUCOSE 298 10/17/2020    GLUCOSE 130 2012    PROT 6.3 10/17/2020    LABALBU 4.3 10/17/2020    LABALBU 4.1 2012    CALCIUM 9.4 10/17/2020    BILITOT 0.2 10/17/2020    ALKPHOS 105 10/17/2020    AST 49 10/17/2020    ALT 46 10/17/2020     Lab Results   Component Value Date    CRP 43.1 (H) 2019    CRP 0.3 10/02/2019    CRP 0.4 2017     Lab Results

## 2020-10-17 NOTE — PROGRESS NOTES
Department of Internal Medicine  Nephrology Progress Note    Events reviewed. SUBJECTIVE:  We are following [address] [place] [address] for ESRD on peritoneal dialysis. Reports no complaints today.      Physical Exam:    VITALS:  /73   Pulse 83   Temp 97.8 °F (36.6 °C) (Oral)   Resp 18   Ht 5' 4\" (1.626 m)   Wt 147 lb 7.8 oz (66.9 kg)   SpO2 98%   BMI 25.32 kg/m²   24HR INTAKE/OUTPUT:      Intake/Output Summary (Last 24 hours) at 10/17/2020 1938  Last data filed at 10/17/2020 1844  Gross per 24 hour   Intake 720 ml   Output 750 ml   Net -30 ml     Constitutional:  Alert and oriented, in no distress  HEENT:  Normocephalic, PERRL  Respiratory:  CTA bilaterally  Cardiovascular/Edema:  RRR, S1/S2  Gastrointestinal:  Soft, PD catheter exit site clean   Neurologic:  Nonfocal, AVELAR  Skin:  Warm, dry, no lesions  Other:  No edema    Scheduled Meds:   insulin lispro  5 Units Subcutaneous TID WC    [START ON 10/18/2020] insulin glargine  12 Units Subcutaneous QAM    lactobacillus  1 capsule Oral Q12H    metoprolol  50 mg Oral BID    cefepime  1 g Intravenous Q24H    sodium chloride  1,000 mL Intravenous Once    sodium chloride  1,000 mL Intravenous Once    lidocaine  5 mL Intradermal Once    heparin flush  3 mL Intravenous 2 times per day    lidocaine  5 mL Intradermal Once    heparin flush  1 mL Intravenous 2 times per day    vancomycin (VANCOCIN) intermittent dosing (placeholder)   Other RX Placeholder    insulin lispro  0-6 Units Subcutaneous TID     insulin lispro  0-3 Units Subcutaneous Nightly    epoetin nena-epbx  8,000 Units Subcutaneous Once per day on Mon Wed Fri    [Held by provider] insulin NPH  5 Units Subcutaneous Nightly    sodium chloride flush  10 mL Intravenous 2 times per day    atorvastatin  40 mg Oral Nightly    dilTIAZem  240 mg Oral Daily    gabapentin  100 mg Oral TID    levothyroxine  50 mcg Oral Daily    metOLazone  5 mg Oral Daily    sodium chloride flush  10 mL Intravenous 2 times per day    heparin (porcine)  5,000 Units Subcutaneous 3 times per day     Continuous Infusions:   sodium chloride Stopped (10/16/20 0015)    dextrose Stopped (10/13/20 1304)     PRN Meds:.oxyCODONE **OR** oxyCODONE, sodium chloride flush, heparin flush, sodium chloride flush, heparin flush, acetaminophen, cloNIDine, LORazepam, acetaminophen **OR** acetaminophen, magnesium hydroxide, promethazine **OR** ondansetron, glucose, dextrose, glucagon (rDNA), dextrose      DATA:    CBC:   Lab Results   Component Value Date    WBC 7.3 10/16/2020    RBC 2.98 10/16/2020    HGB 9.1 10/16/2020    HCT 30.4 10/16/2020    .0 10/16/2020    MCH 30.5 10/16/2020    MCHC 29.9 10/16/2020    RDW 20.6 10/16/2020     10/16/2020    MPV 10.7 10/16/2020     CMP:    Lab Results   Component Value Date     10/17/2020    K 4.7 10/17/2020    K 3.7 10/11/2020     10/17/2020    CO2 20 10/17/2020    BUN 62 10/17/2020    CREATININE 7.3 10/17/2020    GFRAA 8 10/17/2020    LABGLOM 8 10/17/2020    GLUCOSE 298 10/17/2020    GLUCOSE 130 05/18/2012    PROT 6.3 10/17/2020    LABALBU 4.3 10/17/2020    LABALBU 4.1 05/18/2012    CALCIUM 9.4 10/17/2020    BILITOT 0.2 10/17/2020    ALKPHOS 105 10/17/2020    AST 49 10/17/2020    ALT 46 10/17/2020     Magnesium:    Lab Results   Component Value Date    MG 2.3 10/14/2020     Phosphorus:    Lab Results   Component Value Date    PHOS 8.9 10/14/2020     Radiology Review:      Chest x-ray October 8, 2020   No evidence of pneumonia or pleural effusion.               BRIEF SUMMARY OF INITIAL CONSULT:    Briefly, Ronal Thornton is a 31year-old female with history of ESRD on peritoneal dialysis (CCPD), poorly controlled type I DM with multiple admissions for DKA, gastroparesis, HTN  UTI, who was admitted October 8, 2020 after she presented to the ER reporting feeling unwell, having lower back pain and vomiting. In the ER her glucose was 570 mg/dL.   Her urine showed >20

## 2020-10-17 NOTE — PLAN OF CARE
Problem: Pain:  Goal: Pain level will decrease  Description: Pain level will decrease  10/17/2020 0747 by Gayathri Ríos RN  Outcome: Met This Shift     Problem: Pain:  Goal: Control of acute pain  Description: Control of acute pain  10/17/2020 0747 by Gayathri Ríos RN  Outcome: Met This Shift     Problem: Pain:  Goal: Control of chronic pain  Description: Control of chronic pain  10/17/2020 0747 by Gayathri Ríos RN  Outcome: Met This Shift     Problem: Falls - Risk of:  Goal: Will remain free from falls  Description: Will remain free from falls  10/17/2020 0747 by Gayathri Ríos RN  Outcome: Met This Shift     Problem: Falls - Risk of:  Goal: Absence of physical injury  Description: Absence of physical injury  10/17/2020 0747 by Gayathri Ríos RN  Outcome: Met This Shift     Problem: Discharge Planning:  Goal: Discharged to appropriate level of care  Description: Discharged to appropriate level of care  Outcome: Met This Shift

## 2020-10-17 NOTE — FLOWSHEET NOTE
CCPD Tx initiated without incident , no distress.   4 exchanges of 1.5% dextrose each 2 liter fills; 9 hours total treatment time, with last fill of 2 liters 2.5%:  Total 10 liters

## 2020-10-17 NOTE — PROGRESS NOTES
PRN  magnesium hydroxide, 30 mL, Daily PRN  promethazine, 12.5 mg, Q6H PRN    Or  ondansetron, 4 mg, Q6H PRN  glucose, 15 g, PRN  dextrose, 12.5 g, PRN  glucagon (rDNA), 1 mg, PRN  dextrose, 100 mL/hr, PRN         Objective:    /85   Pulse 95   Temp 97.7 °F (36.5 °C) (Oral)   Resp 18   Ht 5' 4\" (1.626 m)   Wt 147 lb 7.8 oz (66.9 kg)   SpO2 96%   BMI 25.32 kg/m²   General Appearance: alert and oriented to person, place and time and in no acute distress  Skin: warm and dry  Head: normocephalic and atraumatic  Eyes: pupils equal, round, and reactive to light, extraocular eye movements intact, conjunctivae normal  Neck: neck supple and non tender without mass   Pulmonary/Chest: clear to auscultation bilaterally- no wheezes, rales or rhonchi, normal air movement, no respiratory distress  Cardiovascular: normal rate, normal S1 and S2 and no carotid bruits  Abdomen: soft, non-tender, non-distended, normal bowel sounds, no masses or organomegaly. Peritoneal dialysis catheter in place without surrounding erythema or drainage  Extremities: Left upper extremity in gutter splint.   Sensation and finger movement intact  Neurologic: no cranial nerve deficit and speech normal      Recent Labs     10/16/20  0545 10/16/20  1115 10/17/20  0609    138 138   K 6.3* 4.8 4.7   CL 98 101 100   CO2 21* 20* 20*   BUN 69* 67* 62*   CREATININE 7.7* 7.7* 7.3*   GLUCOSE 488* 386* 298*   CALCIUM 8.6 9.0 9.4       Recent Labs     10/15/20  0533 10/16/20  0545 10/17/20  0609   ALKPHOS 98 118* 105*   PROT 5.3* 5.6* 6.3*   LABALBU 2.9* 3.4* 4.3   BILITOT <0.2 <0.2 0.2   AST 26 56* 49*   ALT 37* 48* 46*       Recent Labs     10/16/20  0545   WBC 7.3   RBC 2.98*   HGB 9.1*   HCT 30.4*   .0*   MCH 30.5   MCHC 29.9*   RDW 20.6*      MPV 10.7       CBC:   Lab Results   Component Value Date    WBC 7.3 10/16/2020    RBC 2.98 10/16/2020    HGB 9.1 10/16/2020    HCT 30.4 10/16/2020    .0 10/16/2020    MCH 30.5 10/16/2020    MCHC 29.9 10/16/2020    RDW 20.6 10/16/2020     10/16/2020    MPV 10.7 10/16/2020     BMP:    Lab Results   Component Value Date     10/17/2020    K 4.7 10/17/2020    K 3.7 10/11/2020     10/17/2020    CO2 20 10/17/2020    BUN 62 10/17/2020    LABALBU 4.3 10/17/2020    LABALBU 4.1 05/18/2012    CREATININE 7.3 10/17/2020    CALCIUM 9.4 10/17/2020    GFRAA 8 10/17/2020    LABGLOM 8 10/17/2020    GLUCOSE 298 10/17/2020    GLUCOSE 130 05/18/2012        Radiology:   US DUP LOWER EXTREMITIES BILATERAL VENOUS   Final Result   No evidence of DVT in either lower extremity. XR CHEST PORTABLE   Final Result   1. No active pulmonary disease. CT HEAD WO CONTRAST   Final Result   1. No acute intracranial abnormality. XR HAND LEFT (MIN 3 VIEWS)   Final Result   Addendum 1 of 1   ADDENDUM:   Recommend clinical correlation for point tenderness involving the 5th   metacarpal neck. Final      XR KNEE LEFT (3 VIEWS)   Final Result   1. No acute abnormality involving the left knee. XR CHEST PORTABLE   Final Result   No evidence of pneumonia or pleural effusion. Assessment/Plan:  Principal Problem:    Uncontrolled type 1 diabetes mellitus with hyperglycemia, with long-term current use of insulin ([facility])  Active Problems:    Hypertension    Severe protein-calorie malnutrition (HCC)    ESRD on peritoneal dialysis ([facility] [address]    Hypothyroidism    Hyperlipidemia    Acute cystitis without hematuria    Hyperglycemia    Nondisplaced fracture of neck of fifth metacarpal bone, left hand, initial encounter for closed fracture    Acute encephalopathy    Effusion of left knee    Hyperkalemia  Resolved Problems:    * No resolved hospital problems. *      1. Acute metabolic encephalopathy  -Resolved  -Likely due to prolonged hypoglycemia and hypotension    2.   Sepsis secondary to staph epidermidis bacteremia  -On vancomycin and cefepime per infectious disease  -Scheduled for transesophageal echocardiogram on 10/19    3. Uncontrolled type 1 diabetes mellitus  -Blood sugars remain extremely labile  -Ultimately she will need continuous glucose monitor and insulin pump. She is following with endocrinology as an outpatient    4. Hyperkalemia  -resolved this morning    5. Urinary tract infection  -Treated with fosfomycin on 10/9    6. Left fifth metacarpal fracture versus contusion  -Splinted by orthopedic surgery  -Nonweightbearing per orthopedic surgery recommendation    7. Chronic low back pain  -Heating pad and Norco as needed    8. Left knee traumatic effusion  -wrapped per orthopedic surgery    9. End-stage renal disease on peritoneal dialysis  -Continue nightly peritoneal dialysis regimen per nephrology    10. Hypothyroidism  -Continue levothyroxine    11. Hyperlipidemia  -Continue atorvastatin    Disposition: Likely discharge to home when final blood culture results are back and antibiotic regimen has been set up    NOTE: This report was transcribed using voice recognition software. Every effort was made to ensure accuracy; however, inadvertent computerized transcription errors may be present.      Electronically signed by Kulwinder Barros DO on 10/17/2020 at 12:52 PM

## 2020-10-17 NOTE — PROGRESS NOTES
Pharmacy Consultation Note  (Antibiotic Dosing and Monitoring)    Initial consult date: 10/10/20  Consulting physician: Dr. Leah Jiménez  Drug(s): Vancomycin  Indication: Positive blood culture    Ht Readings from Last 1 Encounters:   10/16/20 5' 4\" (1.626 m)     Wt Readings from Last 1 Encounters:   10/16/20 147 lb 7.8 oz (66.9 kg)         Age/  Gender IBW DW  Allergy Information   31 y.o.     female 54.7 kg 58.1 kg  Toradol [ketorolac tromethamine]                 Date  WBC CCPD Drug/Dose Time   Given Level(s)   (Time) Comments   10/10  (#1) 6.8 QHS  Vancomycin 1,000 mg IV x 1 1822     10/11  (#2) 7.7 QHS No dose -- 20.0 mcg/mL @ 1324    10/12  (#3) -- QHS No dose -- 16.4 mcg/mL @ 1511    10/13  (#4) 6.8 QHS Vancomycin 500 mg IV once 1703 14.1 mcg/mL @ 1958    10/14  (#5) -- QHS No dose --     10/15  (#6) -- QHS Vancomycin 750 mg IV once 1843 10.1 mcg/mL @ 1655    10/16  (#7) 7.3 QHS No dose -- 24.6 mcg/mL @ 1400    10/17  (#8) -- QHS No dose -- 19.6 mcg/mL @ 1655      Estimated Creatinine Clearance: 11 mL/min (A) (based on SCr of 7.3 mg/dL (H)). UOP over the past 24 hours:       Intake/Output Summary (Last 24 hours) at 10/17/2020 1834  Last data filed at 10/17/2020 1455  Gross per 24 hour   Intake 360 ml   Output 750 ml   Net -390 ml       Temp max: Temp (24hrs), Av °F (36.7 °C), Min:97.7 °F (36.5 °C), Max:98.6 °F (37 °C)      Antibiotic Regimen: No other antibiotics at this time  Antibiotic Dose Date Initiated   Cefepime 500 mg IV Q48H 10/13     Cultures:  available culture and sensitivity results were reviewed in EPIC  Cultures sent and are pending.   Culture Date Result    Urine 10/8 E faecalis   Blood #1 10 Methicillin-resistant CoNS   Blood #2 10/ Methicillin-resistant CoNS   Blood #3 10/12 NGTD   Blood #4 10/13 NGTD   MRSA Nares 10/13 Not isolated   C diff 10/13 Toxin A/B not detected   Peritoneal Body Fluid Cx 10/13 NGTD     Assessment:  · Consulted by Dr. Leah Jiménez to dose/monitor vancomycin  · Goal

## 2020-10-17 NOTE — FLOWSHEET NOTE
CCPD completed w/o complications. 750 ml pale yellow effluent drained w/o fibrin. PD catheter clamped and capped. Denies c/o. Dressing to PD site changed last p.m. - dry/secure.

## 2020-10-18 LAB
ALBUMIN SERPL-MCNC: 4 G/DL (ref 3.5–5.2)
ALP BLD-CCNC: 117 U/L (ref 35–104)
ALT SERPL-CCNC: 60 U/L (ref 0–32)
ANION GAP SERPL CALCULATED.3IONS-SCNC: 16 MMOL/L (ref 7–16)
AST SERPL-CCNC: 62 U/L (ref 0–31)
BILIRUB SERPL-MCNC: 0.2 MG/DL (ref 0–1.2)
BLOOD CULTURE, ROUTINE: NORMAL
BLOOD CULTURE, ROUTINE: NORMAL
BUN BLDV-MCNC: 59 MG/DL (ref 6–20)
CALCIUM SERPL-MCNC: 9.4 MG/DL (ref 8.6–10.2)
CHLORIDE BLD-SCNC: 99 MMOL/L (ref 98–107)
CO2: 22 MMOL/L (ref 22–29)
CREAT SERPL-MCNC: 7.5 MG/DL (ref 0.5–1)
GFR AFRICAN AMERICAN: 8
GFR NON-AFRICAN AMERICAN: 8 ML/MIN/1.73
GLUCOSE BLD-MCNC: 257 MG/DL (ref 74–99)
METER GLUCOSE: 247 MG/DL (ref 74–99)
METER GLUCOSE: 43 MG/DL (ref 74–99)
METER GLUCOSE: 73 MG/DL (ref 74–99)
METER GLUCOSE: 92 MG/DL (ref 74–99)
METER GLUCOSE: 94 MG/DL (ref 74–99)
METER GLUCOSE: 98 MG/DL (ref 74–99)
POTASSIUM SERPL-SCNC: 5.1 MMOL/L (ref 3.5–5)
POTASSIUM SERPL-SCNC: 5.4 MMOL/L (ref 3.5–5)
SODIUM BLD-SCNC: 137 MMOL/L (ref 132–146)
TOTAL PROTEIN: 6.2 G/DL (ref 6.4–8.3)
VANCOMYCIN RANDOM: 17.2 MCG/ML (ref 5–40)

## 2020-10-18 PROCEDURE — 84132 ASSAY OF SERUM POTASSIUM: CPT

## 2020-10-18 PROCEDURE — 6370000000 HC RX 637 (ALT 250 FOR IP): Performed by: INTERNAL MEDICINE

## 2020-10-18 PROCEDURE — 6360000002 HC RX W HCPCS: Performed by: INTERNAL MEDICINE

## 2020-10-18 PROCEDURE — 2580000003 HC RX 258: Performed by: SPECIALIST

## 2020-10-18 PROCEDURE — 90945 DIALYSIS ONE EVALUATION: CPT

## 2020-10-18 PROCEDURE — 1200000000 HC SEMI PRIVATE

## 2020-10-18 PROCEDURE — 6360000002 HC RX W HCPCS: Performed by: SPECIALIST

## 2020-10-18 PROCEDURE — 99232 SBSQ HOSP IP/OBS MODERATE 35: CPT | Performed by: INTERNAL MEDICINE

## 2020-10-18 PROCEDURE — 36415 COLL VENOUS BLD VENIPUNCTURE: CPT

## 2020-10-18 PROCEDURE — 82962 GLUCOSE BLOOD TEST: CPT

## 2020-10-18 PROCEDURE — 2580000003 HC RX 258: Performed by: INTERNAL MEDICINE

## 2020-10-18 PROCEDURE — 80202 ASSAY OF VANCOMYCIN: CPT

## 2020-10-18 PROCEDURE — 6370000000 HC RX 637 (ALT 250 FOR IP): Performed by: SPECIALIST

## 2020-10-18 PROCEDURE — 80053 COMPREHEN METABOLIC PANEL: CPT

## 2020-10-18 RX ADMIN — GABAPENTIN 100 MG: 100 CAPSULE ORAL at 14:21

## 2020-10-18 RX ADMIN — ONDANSETRON 4 MG: 2 INJECTION INTRAMUSCULAR; INTRAVENOUS at 17:47

## 2020-10-18 RX ADMIN — Medication 1 CAPSULE: at 08:57

## 2020-10-18 RX ADMIN — Medication 1 CAPSULE: at 21:08

## 2020-10-18 RX ADMIN — VANCOMYCIN HYDROCHLORIDE 500 MG: 500 INJECTION, POWDER, LYOPHILIZED, FOR SOLUTION INTRAVENOUS at 17:47

## 2020-10-18 RX ADMIN — GABAPENTIN 100 MG: 100 CAPSULE ORAL at 21:08

## 2020-10-18 RX ADMIN — OXYCODONE 10 MG: 5 TABLET ORAL at 21:09

## 2020-10-18 RX ADMIN — ATORVASTATIN CALCIUM 40 MG: 40 TABLET, FILM COATED ORAL at 21:08

## 2020-10-18 RX ADMIN — INSULIN LISPRO 2 UNITS: 100 INJECTION, SOLUTION INTRAVENOUS; SUBCUTANEOUS at 09:01

## 2020-10-18 RX ADMIN — DILTIAZEM HYDROCHLORIDE 240 MG: 240 CAPSULE, COATED, EXTENDED RELEASE ORAL at 08:57

## 2020-10-18 RX ADMIN — Medication 100 UNITS: at 08:59

## 2020-10-18 RX ADMIN — METOLAZONE 5 MG: 2.5 TABLET ORAL at 08:57

## 2020-10-18 RX ADMIN — CEFEPIME HYDROCHLORIDE 1 G: 1 INJECTION, POWDER, FOR SOLUTION INTRAMUSCULAR; INTRAVENOUS at 21:13

## 2020-10-18 RX ADMIN — HEPARIN SODIUM 5000 UNITS: 5000 INJECTION INTRAVENOUS; SUBCUTANEOUS at 21:09

## 2020-10-18 RX ADMIN — GABAPENTIN 100 MG: 100 CAPSULE ORAL at 08:57

## 2020-10-18 RX ADMIN — Medication 10 ML: at 21:09

## 2020-10-18 RX ADMIN — Medication 100 UNITS: at 21:09

## 2020-10-18 RX ADMIN — METOPROLOL TARTRATE 50 MG: 50 TABLET, FILM COATED ORAL at 21:09

## 2020-10-18 RX ADMIN — OXYCODONE 10 MG: 5 TABLET ORAL at 09:53

## 2020-10-18 RX ADMIN — METOPROLOL TARTRATE 50 MG: 50 TABLET, FILM COATED ORAL at 08:57

## 2020-10-18 RX ADMIN — Medication 10 ML: at 08:59

## 2020-10-18 RX ADMIN — INSULIN LISPRO 5 UNITS: 100 INJECTION, SOLUTION INTRAVENOUS; SUBCUTANEOUS at 09:06

## 2020-10-18 RX ADMIN — OXYCODONE 10 MG: 5 TABLET ORAL at 16:26

## 2020-10-18 RX ADMIN — ONDANSETRON 4 MG: 2 INJECTION INTRAMUSCULAR; INTRAVENOUS at 09:53

## 2020-10-18 RX ADMIN — HEPARIN SODIUM 5000 UNITS: 5000 INJECTION INTRAVENOUS; SUBCUTANEOUS at 14:21

## 2020-10-18 RX ADMIN — DEXTROSE MONOHYDRATE 12.5 G: 25 INJECTION, SOLUTION INTRAVENOUS at 12:08

## 2020-10-18 RX ADMIN — INSULIN GLARGINE 12 UNITS: 100 INJECTION, SOLUTION SUBCUTANEOUS at 09:00

## 2020-10-18 ASSESSMENT — PAIN SCALES - GENERAL
PAINLEVEL_OUTOF10: 0
PAINLEVEL_OUTOF10: 9
PAINLEVEL_OUTOF10: 3
PAINLEVEL_OUTOF10: 7
PAINLEVEL_OUTOF10: 8

## 2020-10-18 ASSESSMENT — PAIN DESCRIPTION - LOCATION: LOCATION: HAND

## 2020-10-18 ASSESSMENT — PAIN DESCRIPTION - PAIN TYPE: TYPE: ACUTE PAIN

## 2020-10-18 ASSESSMENT — PAIN DESCRIPTION - ORIENTATION: ORIENTATION: LEFT

## 2020-10-18 NOTE — PLAN OF CARE
Problem: Pain:  Goal: Pain level will decrease  Description: Pain level will decrease  10/18/2020 1022 by Belia Hubbard RN  Outcome: Met This Shift     Problem: Pain:  Goal: Control of acute pain  Description: Control of acute pain  10/18/2020 1022 by Belia Hubbard RN  Outcome: Met This Shift     Problem: Pain:  Goal: Control of chronic pain  Description: Control of chronic pain  10/18/2020 1022 by Belia Hubbard RN  Outcome: Met This Shift     Problem: Falls - Risk of:  Goal: Will remain free from falls  Description: Will remain free from falls  10/18/2020 1022 by Belia Hubbard RN  Outcome: Met This Shift     Problem: Falls - Risk of:  Goal: Absence of physical injury  Description: Absence of physical injury  10/18/2020 1022 by Belia Hubbard RN  Outcome: Met This Shift     Problem: Discharge Planning:  Goal: Discharged to appropriate level of care  Description: Discharged to appropriate level of care  Outcome: Met This Shift     Problem: Serum Glucose Level - Abnormal:  Goal: Ability to maintain appropriate glucose levels will improve  Description: Ability to maintain appropriate glucose levels will improve  10/18/2020 1022 by Belia Hubbard RN  Outcome: Met This Shift     Problem: Sensory Perception - Impaired:  Goal: Ability to maintain a stable neurologic state will improve  Description: Ability to maintain a stable neurologic state will improve  10/18/2020 1022 by Belia Hubbard RN  Outcome: Met This Shift

## 2020-10-18 NOTE — PROGRESS NOTES
Department of Internal Medicine  Nephrology Progress Note    Events reviewed. SUBJECTIVE:  We are following [address] [place] [address] for ESRD on peritoneal dialysis. Reports no complaints today.      Physical Exam:    VITALS:  /83   Pulse 80   Temp 97.8 °F (36.6 °C) (Oral)   Resp 18   Ht 5' 4\" (1.626 m)   Wt 147 lb 7.8 oz (66.9 kg)   SpO2 95%   BMI 25.32 kg/m²   24HR INTAKE/OUTPUT:      Intake/Output Summary (Last 24 hours) at 10/18/2020 2030  Last data filed at 10/18/2020 1852  Gross per 24 hour   Intake 460 ml   Output --   Net 460 ml     Constitutional:  Alert and oriented, in no distress  HEENT:  Normocephalic, PERRL  Respiratory:  CTA bilaterally  Cardiovascular/Edema:  RRR, S1/S2  Gastrointestinal:  Soft, PD catheter exit site clean   Neurologic:  Nonfocal, AVELAR  Skin:  Warm, dry, no lesions  Other:  No edema    Scheduled Meds:   insulin lispro  5 Units Subcutaneous TID WC    insulin glargine  12 Units Subcutaneous QAM    lactobacillus  1 capsule Oral Q12H    metoprolol  50 mg Oral BID    cefepime  1 g Intravenous Q24H    sodium chloride  1,000 mL Intravenous Once    sodium chloride  1,000 mL Intravenous Once    lidocaine  5 mL Intradermal Once    heparin flush  3 mL Intravenous 2 times per day    lidocaine  5 mL Intradermal Once    heparin flush  1 mL Intravenous 2 times per day    vancomycin (VANCOCIN) intermittent dosing (placeholder)   Other RX Placeholder    insulin lispro  0-6 Units Subcutaneous TID WC    insulin lispro  0-3 Units Subcutaneous Nightly    epoetin nena-epbx  8,000 Units Subcutaneous Once per day on Mon Wed Fri    [Held by provider] insulin NPH  5 Units Subcutaneous Nightly    sodium chloride flush  10 mL Intravenous 2 times per day    atorvastatin  40 mg Oral Nightly    dilTIAZem  240 mg Oral Daily    gabapentin  100 mg Oral TID    levothyroxine  50 mcg Oral Daily    metOLazone  5 mg Oral Daily    sodium chloride flush  10 mL Intravenous 2 times per day    heparin (porcine)  5,000 Units Subcutaneous 3 times per day     Continuous Infusions:   sodium chloride Stopped (10/16/20 0015)    dextrose Stopped (10/13/20 1304)     PRN Meds:.oxyCODONE **OR** oxyCODONE, sodium chloride flush, heparin flush, sodium chloride flush, heparin flush, acetaminophen, cloNIDine, LORazepam, acetaminophen **OR** acetaminophen, magnesium hydroxide, promethazine **OR** ondansetron, glucose, dextrose, glucagon (rDNA), dextrose      DATA:    CBC:   Lab Results   Component Value Date    WBC 7.3 10/16/2020    RBC 2.98 10/16/2020    HGB 9.1 10/16/2020    HCT 30.4 10/16/2020    .0 10/16/2020    MCH 30.5 10/16/2020    MCHC 29.9 10/16/2020    RDW 20.6 10/16/2020     10/16/2020    MPV 10.7 10/16/2020     CMP:    Lab Results   Component Value Date     10/18/2020    K 5.1 10/18/2020    K 3.7 10/11/2020    CL 99 10/18/2020    CO2 22 10/18/2020    BUN 59 10/18/2020    CREATININE 7.5 10/18/2020    GFRAA 8 10/18/2020    LABGLOM 8 10/18/2020    GLUCOSE 257 10/18/2020    GLUCOSE 130 05/18/2012    PROT 6.2 10/18/2020    LABALBU 4.0 10/18/2020    LABALBU 4.1 05/18/2012    CALCIUM 9.4 10/18/2020    BILITOT 0.2 10/18/2020    ALKPHOS 117 10/18/2020    AST 62 10/18/2020    ALT 60 10/18/2020     Magnesium:    Lab Results   Component Value Date    MG 2.3 10/14/2020     Phosphorus:    Lab Results   Component Value Date    PHOS 8.9 10/14/2020     Radiology Review:      Chest x-ray October 8, 2020   No evidence of pneumonia or pleural effusion.               BRIEF SUMMARY OF INITIAL CONSULT:    Briefly, Kimberly Mendes is a 31year-old female with history of ESRD on peritoneal dialysis (CCPD), poorly controlled type I DM with multiple admissions for DKA, gastroparesis, HTN  UTI, who was admitted October 8, 2020 after she presented to the ER reporting feeling unwell, having lower back pain and vomiting. In the ER her glucose was 570 mg/dL. Her urine showed >20 WBCs.     IMPRESSION/RECOMMENDATIONS: 1. ESRD on peritoneal dialysis/CCPD. To continue same prescription. 2. Hyperkalemia, multifactorial, 2/2 hyperglycemia, ESRD and K supplementation (orange juice)   3. Coagulase-negative Staphylococcus bacteremia, probably source [employer], on vancomycin and cefepime; for YULISSA  4. E faecalis bacteriuria  5. HTN, on metoprolol and diltiazem  6. Type I DM, glucose levels improved  7. Left fifth metacarpal fracture, status post fall  8. Left knee effusion, traumatic  9.  Anemia of CKD, on epoetin alpha 8,000 units tiw    Plan:    · Continue low potassium diet  · Continue CCPD  · Continue Bumex 2 mg twice daily  · Continue metolazone 5 mg daily  · Continue diltiazem 240 mg daily  · Continue lisinopril 20 mg daily  · Continue metoprolol 100 mg twice a day  · Continue to monitor labs  · Do not give orange juice for hypoglycemia     Electronically signed by Julito Hope MD on 10/18/2020 at 8:30 PM

## 2020-10-18 NOTE — PROGRESS NOTES
Pharmacy Consultation Note  (Antibiotic Dosing and Monitoring)    Initial consult date: 10/10/20  Consulting physician: Dr. Neha Levine  Drug(s): Vancomycin  Indication: Positive blood culture    Ht Readings from Last 1 Encounters:   10/16/20 5' 4\" (1.626 m)     Wt Readings from Last 1 Encounters:   10/16/20 147 lb 7.8 oz (66.9 kg)         Age/  Gender IBW DW  Allergy Information   31 y.o.     female 54.7 kg 58.1 kg  Toradol [ketorolac tromethamine]                 Date  WBC CCPD Drug/Dose Time   Given Level(s)   (Time) Comments   10/10  (#1) 6.8 QHS  Vancomycin 1,000 mg IV x 1 1822     10/11  (#2) 7.7 QHS No dose -- 20.0 mcg/mL @ 1324    10/12  (#3) -- QHS No dose -- 16.4 mcg/mL @ 1511    10/13  (#4) 6.8 QHS Vancomycin 500 mg IV once 1703 14.1 mcg/mL @ 0022    10/14  (#5) -- QHS No dose --     10/15  (#6) -- QHS Vancomycin 750 mg IV once 1843 10.1 mcg/mL @ 1655    10/16  (#7) 7.3 QHS No dose -- 24.6 mcg/mL @ 1400    10/17  (#8) -- QHS No dose -- 19.6 mcg/mL @ 1655    10/18  (#9) -- QHS Vancomycin 500 mg IV once <1730> 17.2 mcg/mL @ 1430      Estimated Creatinine Clearance: 11 mL/min (A) (based on SCr of 7.5 mg/dL (H)). UOP over the past 24 hours:       Intake/Output Summary (Last 24 hours) at 10/18/2020 1610  Last data filed at 10/18/2020 1408  Gross per 24 hour   Intake 460 ml   Output --   Net 460 ml       Temp max: Temp (24hrs), Av.8 °F (36.6 °C), Min:97.7 °F (36.5 °C), Max:97.8 °F (36.6 °C)      Antibiotic Regimen: No other antibiotics at this time  Antibiotic Dose Date Initiated   Cefepime 500 mg IV Q48H 10/13     Cultures:  available culture and sensitivity results were reviewed in EPIC  Cultures sent and are pending.   Culture Date Result    Urine 10/8 E faecalis   Blood #1 10 Methicillin-resistant CoNS   Blood #2 10 Methicillin-resistant CoNS   Blood #3 10/12 NGTD   Blood #4 10/13 NGTD   MRSA Nares 10/13 Not isolated   C diff 10/13 Toxin A/B not detected   Peritoneal Body Fluid Cx 10/13 NGTD

## 2020-10-18 NOTE — PROGRESS NOTES
[address] [place], [place] [address]   Progress Note    Admitting Date and Time: 10/8/2020  3:15 PM  Admit Dx: Type I diabetes mellitus with hyperosmolar coma ([facility] [address] [E10.69, E10.65]    Subjective/interval history:    Patient's left hand pain has improved. She does complain of some nausea. Another episode of hypoglycemia this morning. Scheduled to have YULISSA tomorrow. ROS: denies fever, chills, cp, sob, HA unless stated above.      insulin lispro  5 Units Subcutaneous TID WC    insulin glargine  12 Units Subcutaneous QAM    lactobacillus  1 capsule Oral Q12H    metoprolol  50 mg Oral BID    cefepime  1 g Intravenous Q24H    sodium chloride  1,000 mL Intravenous Once    sodium chloride  1,000 mL Intravenous Once    lidocaine  5 mL Intradermal Once    heparin flush  3 mL Intravenous 2 times per day    lidocaine  5 mL Intradermal Once    heparin flush  1 mL Intravenous 2 times per day    vancomycin (VANCOCIN) intermittent dosing (placeholder)   Other RX Placeholder    insulin lispro  0-6 Units Subcutaneous TID WC    insulin lispro  0-3 Units Subcutaneous Nightly    epoetin nena-epbx  8,000 Units Subcutaneous Once per day on Mon Wed Fri    [Held by provider] insulin NPH  5 Units Subcutaneous Nightly    sodium chloride flush  10 mL Intravenous 2 times per day    atorvastatin  40 mg Oral Nightly    dilTIAZem  240 mg Oral Daily    gabapentin  100 mg Oral TID    levothyroxine  50 mcg Oral Daily    metOLazone  5 mg Oral Daily    sodium chloride flush  10 mL Intravenous 2 times per day    heparin (porcine)  5,000 Units Subcutaneous 3 times per day     oxyCODONE, 5 mg, Q4H PRN    Or  oxyCODONE, 10 mg, Q4H PRN  sodium chloride flush, 10 mL, PRN  heparin flush, 3 mL, PRN  sodium chloride flush, 10 mL, PRN  heparin flush, 1 mL, PRN  acetaminophen, 650 mg, Q4H PRN  cloNIDine, 0.1 mg, BID PRN  LORazepam, 0.5 mg, BID PRN  acetaminophen, 650 mg, Q6H PRN    Or  acetaminophen, 650 mg, Q6H PRN  magnesium hydroxide, 30 mL, Daily PRN  promethazine, 12.5 mg, Q6H PRN    Or  ondansetron, 4 mg, Q6H PRN  glucose, 15 g, PRN  dextrose, 12.5 g, PRN  glucagon (rDNA), 1 mg, PRN  dextrose, 100 mL/hr, PRN         Objective:    /71   Pulse 85   Temp 97.8 °F (36.6 °C) (Oral)   Resp 16   Ht 5' 4\" (1.626 m)   Wt 147 lb 7.8 oz (66.9 kg)   SpO2 98%   BMI 25.32 kg/m²   General Appearance: alert and oriented to person, place and time and in no acute distress  Skin: warm and dry  Head: normocephalic and atraumatic  Eyes: pupils equal, round, and reactive to light, extraocular eye movements intact, conjunctivae normal  Neck: neck supple and non tender without mass   Pulmonary/Chest: clear to auscultation bilaterally- no wheezes, rales or rhonchi, normal air movement, no respiratory distress  Cardiovascular: normal rate, normal S1 and S2 and no carotid bruits  Abdomen: soft, non-tender, non-distended, normal bowel sounds, no masses or organomegaly. Peritoneal dialysis catheter in place without surrounding erythema or drainage  Extremities: Left upper extremity in gutter splint.   Sensation and finger movement intact  Neurologic: no cranial nerve deficit and speech normal      Recent Labs     10/16/20  1115 10/17/20  0609 10/18/20  0620 10/18/20  1210    138 137  --    K 4.8 4.7 5.4* 5.1*    100 99  --    CO2 20* 20* 22  --    BUN 67* 62* 59*  --    CREATININE 7.7* 7.3* 7.5*  --    GLUCOSE 386* 298* 257*  --    CALCIUM 9.0 9.4 9.4  --        Recent Labs     10/16/20  0545 10/17/20  0609 10/18/20  0620   ALKPHOS 118* 105* 117*   PROT 5.6* 6.3* 6.2*   LABALBU 3.4* 4.3 4.0   BILITOT <0.2 0.2 0.2   AST 56* 49* 62*   ALT 48* 46* 60*       Recent Labs     10/16/20  0545   WBC 7.3   RBC 2.98*   HGB 9.1*   HCT 30.4*   .0*   MCH 30.5   MCHC 29.9*   RDW 20.6*      MPV 10.7       CBC:   Lab Results   Component Value Date    WBC 7.3 10/16/2020    RBC 2.98 10/16/2020    HGB 9.1 10/16/2020    HCT 30.4 10/16/2020 .0 10/16/2020    MCH 30.5 10/16/2020    MCHC 29.9 10/16/2020    RDW 20.6 10/16/2020     10/16/2020    MPV 10.7 10/16/2020     BMP:    Lab Results   Component Value Date     10/18/2020    K 5.1 10/18/2020    K 3.7 10/11/2020    CL 99 10/18/2020    CO2 22 10/18/2020    BUN 59 10/18/2020    LABALBU 4.0 10/18/2020    LABALBU 4.1 05/18/2012    CREATININE 7.5 10/18/2020    CALCIUM 9.4 10/18/2020    GFRAA 8 10/18/2020    LABGLOM 8 10/18/2020    GLUCOSE 257 10/18/2020    GLUCOSE 130 05/18/2012        Radiology:   US DUP LOWER EXTREMITIES BILATERAL VENOUS   Final Result   No evidence of DVT in either lower extremity. XR CHEST PORTABLE   Final Result   1. No active pulmonary disease. CT HEAD WO CONTRAST   Final Result   1. No acute intracranial abnormality. XR HAND LEFT (MIN 3 VIEWS)   Final Result   Addendum 1 of 1   ADDENDUM:   Recommend clinical correlation for point tenderness involving the 5th   metacarpal neck. Final      XR KNEE LEFT (3 VIEWS)   Final Result   1. No acute abnormality involving the left knee. XR CHEST PORTABLE   Final Result   No evidence of pneumonia or pleural effusion. Assessment/Plan:  Principal Problem:    Uncontrolled type 1 diabetes mellitus with hyperglycemia, with long-term current use of insulin (HCC)  Active Problems:    Hypertension    Severe protein-calorie malnutrition (HCC)    ESRD on peritoneal dialysis ([facility] [address]    Hypothyroidism    Hyperlipidemia    Acute cystitis without hematuria    Hyperglycemia    Nondisplaced fracture of neck of fifth metacarpal bone, left hand, initial encounter for closed fracture    Acute encephalopathy    Effusion of left knee    Hyperkalemia  Resolved Problems:    * No resolved hospital problems. *      1. Acute metabolic encephalopathy  -Resolved  -Likely due to prolonged hypoglycemia and hypotension    2.   Sepsis secondary to staph epidermidis bacteremia  -On vancomycin and cefepime per infectious disease  -Scheduled for transesophageal echocardiogram on 10/19    3. Uncontrolled type 1 diabetes mellitus  -Blood sugars remain extremely labile  -Ultimately she will need continuous glucose monitor and insulin pump. She is following with endocrinology as an outpatient    4. Hyperkalemia  -Continue scheduled peritoneal dialysis and as needed insulin for treatment of acute hyperkalemia    5. Urinary tract infection  -Treated with fosfomycin on 10/9    6. Left fifth metacarpal fracture versus contusion  -Splinted by orthopedic surgery  -Nonweightbearing per orthopedic surgery recommendation    7. Chronic low back pain  -Heating pad and Norco as needed    8. Left knee traumatic effusion  -wrapped per orthopedic surgery    9. End-stage renal disease on peritoneal dialysis  -Continue nightly peritoneal dialysis regimen per nephrology    10. Hypothyroidism  -Continue levothyroxine    11. Hyperlipidemia  -Continue atorvastatin    Disposition: Once antibiotic regimen is finalized after YULISSA, start discharge planning. May need long-term home IV antibiotic's. NOTE: This report was transcribed using voice recognition software. Every effort was made to ensure accuracy; however, inadvertent computerized transcription errors may be present.      Electronically signed by Celi Maloney DO on 10/18/2020 at 1:28 PM

## 2020-10-18 NOTE — PROGRESS NOTES
[facility]  [facility]  Progress Note    NAME:Wilton Flores  1990  DATE OF SERVICE:10/18/20    Pt was seen FACE TO FACE FOR atbx    SUBJECTIVE:  Chief Complaint   Patient presents with    Hyperglycemia     blood sugar high today vomiting back pain   has nausea   Has no diarrhea    Afebrile   More awake  Patient is tolerating medications. No reported adverse drug reactions. Review of systems:  As stated above in the chief complaint, otherwise negative.     Medications:  Scheduled Meds:   insulin lispro  5 Units Subcutaneous TID WC    insulin glargine  12 Units Subcutaneous QAM    lactobacillus  1 capsule Oral Q12H    metoprolol  50 mg Oral BID    cefepime  1 g Intravenous Q24H    sodium chloride  1,000 mL Intravenous Once    sodium chloride  1,000 mL Intravenous Once    lidocaine  5 mL Intradermal Once    heparin flush  3 mL Intravenous 2 times per day    lidocaine  5 mL Intradermal Once    heparin flush  1 mL Intravenous 2 times per day    vancomycin (VANCOCIN) intermittent dosing (placeholder)   Other RX Placeholder    insulin lispro  0-6 Units Subcutaneous TID WC    insulin lispro  0-3 Units Subcutaneous Nightly    epoetin nena-epbx  8,000 Units Subcutaneous Once per day on Mon Wed Fri    [Held by provider] insulin NPH  5 Units Subcutaneous Nightly    sodium chloride flush  10 mL Intravenous 2 times per day    atorvastatin  40 mg Oral Nightly    dilTIAZem  240 mg Oral Daily    gabapentin  100 mg Oral TID    levothyroxine  50 mcg Oral Daily    metOLazone  5 mg Oral Daily    sodium chloride flush  10 mL Intravenous 2 times per day    heparin (porcine)  5,000 Units Subcutaneous 3 times per day     Continuous Infusions:   sodium chloride Stopped (10/16/20 0015)    dextrose Stopped (10/13/20 1304)     PRN Meds:oxyCODONE **OR** oxyCODONE, sodium chloride flush, heparin flush, sodium chloride flush, heparin flush, acetaminophen, cloNIDine, LORazepam, acetaminophen **OR** acetaminophen, magnesium hydroxide, promethazine **OR** ondansetron, glucose, dextrose, glucagon (rDNA), dextrose    OBJECTIVE:  /71   Pulse 85   Temp 97.8 °F (36.6 °C) (Oral)   Resp 16   Ht 5' 4\" (1.626 m)   Wt 147 lb 7.8 oz (66.9 kg)   SpO2 98%   BMI 25.32 kg/m²   Temp  Av.8 °F (36.6 °C)  Min: 97.7 °F (36.5 °C)  Max: 97.8 °F (36.6 °C)  Constitutional:  The patient is awake, ALERT  Skin:    Warm and dry. No rashes were noted. HEENT:      AT/NC. Chest:   No use of accessory muscles to breathe. Symmetrical expansion. Cardiovascular:  S1 and S2 are rhythmic and regular. No murmurs appreciated. Abdomen:   Positive bowel sounds to auscultation. Benign to palpation. Pd CATH distended  Extremities:    edema.  LEFT UE ACED  CNS    NAD  Lines: LEFT MEDIPORT ru epic     Radiology:  Laboratory and Tests Review:  Lab Results   Component Value Date    WBC 7.3 10/16/2020    WBC 6.8 10/13/2020    WBC 7.7 10/11/2020    HGB 9.1 (L) 10/16/2020    HCT 30.4 (L) 10/16/2020    .0 (H) 10/16/2020     10/16/2020     No results found for: [facility]  Lab Results   Component Value Date    ALT 60 (H) 10/18/2020    AST 62 (H) 10/18/2020    ALKPHOS 117 (H) 10/18/2020    BILITOT 0.2 10/18/2020     Lab Results   Component Value Date     10/18/2020    K 5.1 10/18/2020    K 3.7 10/11/2020    CL 99 10/18/2020    CO2 22 10/18/2020    BUN 59 10/18/2020    CREATININE 7.5 10/18/2020    CREATININE 7.3 10/17/2020    CREATININE 7.7 10/16/2020    GFRAA 8 10/18/2020    LABGLOM 8 10/18/2020    GLUCOSE 257 10/18/2020    GLUCOSE 130 2012    PROT 6.2 10/18/2020    LABALBU 4.0 10/18/2020    LABALBU 4.1 2012    CALCIUM 9.4 10/18/2020    BILITOT 0.2 10/18/2020    ALKPHOS 117 10/18/2020    AST 62 10/18/2020    ALT 60 10/18/2020     Lab Results   Component Value Date    CRP 43.1 (H) 2019    CRP 0.3 10/02/2019    CRP 0.4 2017     Lab Results   Component Value Date SEDRATE 19 10/13/2020    SEDRATE 135 (H) 11/01/2019    SEDRATE 14 09/28/2017       Microbiology:   No results for input(s): COVID19 in the last 72 hours. Lab Results   Component Value Date    BLOODCULT2 5 Days no growth 07/18/2020    BLOODCULT2 5 Days- no growth 02/09/2020    BLOODCULT2 5 Days- no growth 02/08/2020    ORG Staphylococcus epidermidis 10/08/2020    ORG Staphylococcus epidermidis 10/08/2020    ORG Enterococcus faecalis 10/08/2020     WOUND/ABSCESS   Date Value Ref Range Status   12/11/2014 (A)  Final    Mixed yulisa also isolated, including:  Alpha hemolytic Strep species  Corynebacteria     12/11/2014 Heavy growth  Final     No results found for: RESPSMEAR  No results found for: MPNEUMO, CLAMYDCU, LABLEGI, AFBCX, FUNGSM, LABFUNG    MRSA Culture Only   Date Value Ref Range Status   10/13/2020 Methicillin resistant Staph aureus not isolated  Final        ASSESSMENT/PLAN:  Cons bacteremia has left MediPort     E. faecalis bacteruria   CKD PD CATH has abd pain check cx  S/p fall -  traumatic nondisplaced fracture involving the 5th    metacarpal neck     Wants ortho to see  -most likely line infection  -f/u blood cx  -f/u PD fluid  Cefepime   Esr/crp YULISSA     cefepime (MAXIPIME) 1 g IVPB extended (mini-bag), Q24H WOULD STOP AFTER 8 DAYS     Vancomycin     · Monitor labs    Imaging and labs were reviewed per medical records. The patient was educated about the diagnosis, prognosis, indications, risks and benefits of treatment. An opportunity to ask questions was given to the patient/FAMILY. Thank you for involving me in the care of [address] [place] [address] I will continue to follow. Please do not hesitate to call for any questions or concerns.     Electronically signed by Padmaja Mendez MD on 10/18/2020 at 1:31 PM

## 2020-10-18 NOTE — PLAN OF CARE
Problem: Pain:  Goal: Pain level will decrease  Description: Pain level will decrease  Outcome: Met This Shift     Problem: Pain:  Goal: Control of acute pain  Description: Control of acute pain  Outcome: Met This Shift     Problem: Pain:  Goal: Control of chronic pain  Description: Control of chronic pain  Outcome: Met This Shift     Problem: Falls - Risk of:  Goal: Will remain free from falls  Description: Will remain free from falls  Outcome: Met This Shift     Problem: Falls - Risk of:  Goal: Absence of physical injury  Description: Absence of physical injury  Outcome: Met This Shift     Problem: Serum Glucose Level - Abnormal:  Goal: Ability to maintain appropriate glucose levels will improve  Description: Ability to maintain appropriate glucose levels will improve  Outcome: Met This Shift     Problem: Sensory Perception - Impaired:  Goal: Ability to maintain a stable neurologic state will improve  Description: Ability to maintain a stable neurologic state will improve  Outcome: Met This Shift

## 2020-10-19 ENCOUNTER — ANESTHESIA EVENT (OUTPATIENT)
Dept: ENDOSCOPY | Age: 30
DRG: 721 | End: 2020-10-19
Payer: COMMERCIAL

## 2020-10-19 ENCOUNTER — ANESTHESIA (OUTPATIENT)
Dept: ENDOSCOPY | Age: 30
DRG: 721 | End: 2020-10-19
Payer: COMMERCIAL

## 2020-10-19 VITALS
DIASTOLIC BLOOD PRESSURE: 70 MMHG | OXYGEN SATURATION: 90 % | SYSTOLIC BLOOD PRESSURE: 100 MMHG | RESPIRATION RATE: 7 BRPM

## 2020-10-19 LAB
ALBUMIN SERPL-MCNC: 3.6 G/DL (ref 3.5–5.2)
ALP BLD-CCNC: 125 U/L (ref 35–104)
ALT SERPL-CCNC: 68 U/L (ref 0–32)
ANION GAP SERPL CALCULATED.3IONS-SCNC: 12 MMOL/L (ref 7–16)
ANISOCYTOSIS: ABNORMAL
AST SERPL-CCNC: 56 U/L (ref 0–31)
BASOPHILS ABSOLUTE: 0.07 E9/L (ref 0–0.2)
BASOPHILS RELATIVE PERCENT: 1.1 % (ref 0–2)
BILIRUB SERPL-MCNC: 0.2 MG/DL (ref 0–1.2)
BUN BLDV-MCNC: 59 MG/DL (ref 6–20)
CALCIUM SERPL-MCNC: 9 MG/DL (ref 8.6–10.2)
CHLORIDE BLD-SCNC: 101 MMOL/L (ref 98–107)
CO2: 24 MMOL/L (ref 22–29)
CREAT SERPL-MCNC: 7.6 MG/DL (ref 0.5–1)
EOSINOPHILS ABSOLUTE: 0.5 E9/L (ref 0.05–0.5)
EOSINOPHILS RELATIVE PERCENT: 7.6 % (ref 0–6)
GFR AFRICAN AMERICAN: 8
GFR NON-AFRICAN AMERICAN: 8 ML/MIN/1.73
GLUCOSE BLD-MCNC: 115 MG/DL (ref 74–99)
HCT VFR BLD CALC: 29.9 % (ref 34–48)
HEMOGLOBIN: 8.9 G/DL (ref 11.5–15.5)
IMMATURE GRANULOCYTES #: 0.03 E9/L
IMMATURE GRANULOCYTES %: 0.5 % (ref 0–5)
LYMPHOCYTES ABSOLUTE: 1.45 E9/L (ref 1.5–4)
LYMPHOCYTES RELATIVE PERCENT: 21.9 % (ref 20–42)
MCH RBC QN AUTO: 30.6 PG (ref 26–35)
MCHC RBC AUTO-ENTMCNC: 29.8 % (ref 32–34.5)
MCV RBC AUTO: 102.7 FL (ref 80–99.9)
METER GLUCOSE: 101 MG/DL (ref 74–99)
METER GLUCOSE: 122 MG/DL (ref 74–99)
METER GLUCOSE: 122 MG/DL (ref 74–99)
METER GLUCOSE: 137 MG/DL (ref 74–99)
METER GLUCOSE: 154 MG/DL (ref 74–99)
METER GLUCOSE: 66 MG/DL (ref 74–99)
METER GLUCOSE: 76 MG/DL (ref 74–99)
MONOCYTES ABSOLUTE: 0.45 E9/L (ref 0.1–0.95)
MONOCYTES RELATIVE PERCENT: 6.8 % (ref 2–12)
NEUTROPHILS ABSOLUTE: 4.12 E9/L (ref 1.8–7.3)
NEUTROPHILS RELATIVE PERCENT: 62.1 % (ref 43–80)
PDW BLD-RTO: 21.2 FL (ref 11.5–15)
PLATELET # BLD: 249 E9/L (ref 130–450)
PMV BLD AUTO: 9.9 FL (ref 7–12)
POLYCHROMASIA: ABNORMAL
POTASSIUM SERPL-SCNC: 5 MMOL/L (ref 3.5–5)
RBC # BLD: 2.91 E12/L (ref 3.5–5.5)
SODIUM BLD-SCNC: 137 MMOL/L (ref 132–146)
TOTAL PROTEIN: 5.6 G/DL (ref 6.4–8.3)
WBC # BLD: 6.6 E9/L (ref 4.5–11.5)

## 2020-10-19 PROCEDURE — 93312 ECHO TRANSESOPHAGEAL: CPT

## 2020-10-19 PROCEDURE — 3700000001 HC ADD 15 MINUTES (ANESTHESIA): Performed by: INTERNAL MEDICINE

## 2020-10-19 PROCEDURE — 6360000002 HC RX W HCPCS: Performed by: INTERNAL MEDICINE

## 2020-10-19 PROCEDURE — 6360000002 HC RX W HCPCS: Performed by: SPECIALIST

## 2020-10-19 PROCEDURE — 82962 GLUCOSE BLOOD TEST: CPT

## 2020-10-19 PROCEDURE — 6370000000 HC RX 637 (ALT 250 FOR IP): Performed by: INTERNAL MEDICINE

## 2020-10-19 PROCEDURE — 36415 COLL VENOUS BLD VENIPUNCTURE: CPT

## 2020-10-19 PROCEDURE — 93312 ECHO TRANSESOPHAGEAL: CPT | Performed by: INTERNAL MEDICINE

## 2020-10-19 PROCEDURE — 2709999900 HC NON-CHARGEABLE SUPPLY: Performed by: INTERNAL MEDICINE

## 2020-10-19 PROCEDURE — 93325 DOPPLER ECHO COLOR FLOW MAPG: CPT

## 2020-10-19 PROCEDURE — 99233 SBSQ HOSP IP/OBS HIGH 50: CPT | Performed by: INTERNAL MEDICINE

## 2020-10-19 PROCEDURE — 7100000010 HC PHASE II RECOVERY - FIRST 15 MIN: Performed by: INTERNAL MEDICINE

## 2020-10-19 PROCEDURE — 1200000000 HC SEMI PRIVATE

## 2020-10-19 PROCEDURE — 2580000003 HC RX 258: Performed by: INTERNAL MEDICINE

## 2020-10-19 PROCEDURE — 2580000003 HC RX 258: Performed by: NURSE ANESTHETIST, CERTIFIED REGISTERED

## 2020-10-19 PROCEDURE — B24BZZ4 ULTRASONOGRAPHY OF HEART WITH AORTA, TRANSESOPHAGEAL: ICD-10-PCS | Performed by: INTERNAL MEDICINE

## 2020-10-19 PROCEDURE — 2580000003 HC RX 258: Performed by: SPECIALIST

## 2020-10-19 PROCEDURE — 6360000002 HC RX W HCPCS: Performed by: NURSE PRACTITIONER

## 2020-10-19 PROCEDURE — 6370000000 HC RX 637 (ALT 250 FOR IP): Performed by: SPECIALIST

## 2020-10-19 PROCEDURE — 90945 DIALYSIS ONE EVALUATION: CPT

## 2020-10-19 PROCEDURE — 7100000011 HC PHASE II RECOVERY - ADDTL 15 MIN: Performed by: INTERNAL MEDICINE

## 2020-10-19 PROCEDURE — 6360000002 HC RX W HCPCS: Performed by: NURSE ANESTHETIST, CERTIFIED REGISTERED

## 2020-10-19 PROCEDURE — 80053 COMPREHEN METABOLIC PANEL: CPT

## 2020-10-19 PROCEDURE — 85025 COMPLETE CBC W/AUTO DIFF WBC: CPT

## 2020-10-19 PROCEDURE — 3700000000 HC ANESTHESIA ATTENDED CARE: Performed by: INTERNAL MEDICINE

## 2020-10-19 RX ORDER — SODIUM CHLORIDE 9 MG/ML
INJECTION, SOLUTION INTRAVENOUS CONTINUOUS PRN
Status: DISCONTINUED | OUTPATIENT
Start: 2020-10-19 | End: 2020-10-19 | Stop reason: SDUPTHER

## 2020-10-19 RX ORDER — PROPOFOL 10 MG/ML
INJECTION, EMULSION INTRAVENOUS PRN
Status: DISCONTINUED | OUTPATIENT
Start: 2020-10-19 | End: 2020-10-19 | Stop reason: SDUPTHER

## 2020-10-19 RX ADMIN — METOPROLOL TARTRATE 50 MG: 50 TABLET, FILM COATED ORAL at 21:48

## 2020-10-19 RX ADMIN — Medication 10 ML: at 08:56

## 2020-10-19 RX ADMIN — DEXTROSE MONOHYDRATE 12.5 G: 25 INJECTION, SOLUTION INTRAVENOUS at 14:06

## 2020-10-19 RX ADMIN — PROPOFOL 25 MG: 10 INJECTION, EMULSION INTRAVENOUS at 15:16

## 2020-10-19 RX ADMIN — PROPOFOL 25 MG: 10 INJECTION, EMULSION INTRAVENOUS at 15:10

## 2020-10-19 RX ADMIN — EPOETIN ALFA-EPBX 8000 UNITS: 4000 INJECTION, SOLUTION INTRAVENOUS; SUBCUTANEOUS at 11:07

## 2020-10-19 RX ADMIN — INSULIN LISPRO 1 UNITS: 100 INJECTION, SOLUTION INTRAVENOUS; SUBCUTANEOUS at 21:50

## 2020-10-19 RX ADMIN — OXYCODONE 10 MG: 5 TABLET ORAL at 01:18

## 2020-10-19 RX ADMIN — LEVOTHYROXINE SODIUM 50 MCG: 50 TABLET ORAL at 06:23

## 2020-10-19 RX ADMIN — SODIUM CHLORIDE: 9 INJECTION, SOLUTION INTRAVENOUS at 15:05

## 2020-10-19 RX ADMIN — PROPOFOL 25 MG: 10 INJECTION, EMULSION INTRAVENOUS at 15:08

## 2020-10-19 RX ADMIN — Medication 10 ML: at 21:43

## 2020-10-19 RX ADMIN — METOPROLOL TARTRATE 50 MG: 50 TABLET, FILM COATED ORAL at 08:55

## 2020-10-19 RX ADMIN — OXYCODONE 10 MG: 5 TABLET ORAL at 17:40

## 2020-10-19 RX ADMIN — DILTIAZEM HYDROCHLORIDE 240 MG: 240 CAPSULE, COATED, EXTENDED RELEASE ORAL at 08:55

## 2020-10-19 RX ADMIN — PROPOFOL 25 MG: 10 INJECTION, EMULSION INTRAVENOUS at 15:12

## 2020-10-19 RX ADMIN — GABAPENTIN 100 MG: 100 CAPSULE ORAL at 08:55

## 2020-10-19 RX ADMIN — METOLAZONE 5 MG: 2.5 TABLET ORAL at 08:55

## 2020-10-19 RX ADMIN — ONDANSETRON 4 MG: 2 INJECTION INTRAMUSCULAR; INTRAVENOUS at 12:12

## 2020-10-19 RX ADMIN — HEPARIN SODIUM 5000 UNITS: 5000 INJECTION INTRAVENOUS; SUBCUTANEOUS at 21:52

## 2020-10-19 RX ADMIN — Medication 1 CAPSULE: at 21:48

## 2020-10-19 RX ADMIN — HEPARIN 300 UNITS: 100 SYRINGE at 06:34

## 2020-10-19 RX ADMIN — CEFEPIME HYDROCHLORIDE 1 G: 1 INJECTION, POWDER, FOR SOLUTION INTRAMUSCULAR; INTRAVENOUS at 17:25

## 2020-10-19 RX ADMIN — OXYCODONE 10 MG: 5 TABLET ORAL at 23:46

## 2020-10-19 RX ADMIN — DEXTROSE MONOHYDRATE 12.5 G: 25 INJECTION, SOLUTION INTRAVENOUS at 11:09

## 2020-10-19 RX ADMIN — GABAPENTIN 100 MG: 100 CAPSULE ORAL at 21:48

## 2020-10-19 RX ADMIN — OXYCODONE 10 MG: 5 TABLET ORAL at 11:15

## 2020-10-19 RX ADMIN — OXYCODONE 10 MG: 5 TABLET ORAL at 06:22

## 2020-10-19 RX ADMIN — ONDANSETRON 4 MG: 2 INJECTION INTRAMUSCULAR; INTRAVENOUS at 17:39

## 2020-10-19 RX ADMIN — ATORVASTATIN CALCIUM 40 MG: 40 TABLET, FILM COATED ORAL at 21:47

## 2020-10-19 RX ADMIN — Medication 100 UNITS: at 21:43

## 2020-10-19 ASSESSMENT — PAIN DESCRIPTION - LOCATION
LOCATION: HAND

## 2020-10-19 ASSESSMENT — PAIN DESCRIPTION - FREQUENCY
FREQUENCY: CONTINUOUS

## 2020-10-19 ASSESSMENT — PAIN DESCRIPTION - ORIENTATION
ORIENTATION: LEFT

## 2020-10-19 ASSESSMENT — PAIN SCALES - GENERAL
PAINLEVEL_OUTOF10: 0
PAINLEVEL_OUTOF10: 7
PAINLEVEL_OUTOF10: 7
PAINLEVEL_OUTOF10: 6
PAINLEVEL_OUTOF10: 7
PAINLEVEL_OUTOF10: 0
PAINLEVEL_OUTOF10: 8
PAINLEVEL_OUTOF10: 0
PAINLEVEL_OUTOF10: 5
PAINLEVEL_OUTOF10: 0
PAINLEVEL_OUTOF10: 5

## 2020-10-19 ASSESSMENT — PAIN DESCRIPTION - PAIN TYPE
TYPE: ACUTE PAIN

## 2020-10-19 ASSESSMENT — PAIN DESCRIPTION - PROGRESSION
CLINICAL_PROGRESSION: NOT CHANGED

## 2020-10-19 ASSESSMENT — PAIN - FUNCTIONAL ASSESSMENT
PAIN_FUNCTIONAL_ASSESSMENT: PREVENTS OR INTERFERES SOME ACTIVE ACTIVITIES AND ADLS

## 2020-10-19 ASSESSMENT — PAIN DESCRIPTION - DESCRIPTORS
DESCRIPTORS: CONSTANT;DISCOMFORT;SORE
DESCRIPTORS: ACHING;CONSTANT;DISCOMFORT
DESCRIPTORS: ACHING;CONSTANT;DISCOMFORT
DESCRIPTORS: ACHING

## 2020-10-19 ASSESSMENT — PAIN DESCRIPTION - ONSET
ONSET: ON-GOING

## 2020-10-19 ASSESSMENT — LIFESTYLE VARIABLES: SMOKING_STATUS: 1

## 2020-10-19 ASSESSMENT — ENCOUNTER SYMPTOMS: SHORTNESS OF BREATH: 0

## 2020-10-19 NOTE — PLAN OF CARE
Problem: Pain:  Goal: Pain level will decrease  Description: Pain level will decrease  Outcome: Met This Shift  Goal: Control of acute pain  Description: Control of acute pain  Outcome: Met This Shift  Goal: Control of chronic pain  Description: Control of chronic pain  Outcome: Met This Shift     Problem: Falls - Risk of:  Goal: Will remain free from falls  Description: Will remain free from falls  Outcome: Met This Shift  Goal: Absence of physical injury  Description: Absence of physical injury  Outcome: Met This Shift     Problem: Serum Glucose Level - Abnormal:  Goal: Ability to maintain appropriate glucose levels will improve  Description: Ability to maintain appropriate glucose levels will improve  Outcome: Met This Shift     Problem: Sensory Perception - Impaired:  Goal: Ability to maintain a stable neurologic state will improve  Description: Ability to maintain a stable neurologic state will improve  Outcome: Met This Shift     Problem: Discharge Planning:  Goal: Discharged to appropriate level of care  Description: Discharged to appropriate level of care  Outcome: Not Met This Shift  Note: Pt.  Not discharged yet

## 2020-10-19 NOTE — HOME CARE
[facility] CARE IV PRICIN [facility] has 100% coverage.   Savanna Ceron LPN, [facility]

## 2020-10-19 NOTE — PROGRESS NOTES
Department of Internal Medicine  Nephrology Progress Note    Events reviewed. For YULISSA today. SUBJECTIVE:  We are following [address] [place] [address] for ESRD on peritoneal dialysis. Reports no new complaints today.     Physical Exam:    VITALS:  /65   Pulse 78   Temp 97.9 °F (36.6 °C) (Oral)   Resp 18   Ht 5' 4\" (1.626 m)   Wt 151 lb 10.8 oz (68.8 kg)   SpO2 94%   BMI 26.04 kg/m²   24HR INTAKE/OUTPUT:      Intake/Output Summary (Last 24 hours) at 10/19/2020 1209  Last data filed at 10/19/2020 0920  Gross per 24 hour   Intake 460 ml   Output 1226 ml   Net -766 ml     Constitutional:  Alert and oriented, in no distress  HEENT:  Normocephalic, PERRL  Respiratory:  CTA bilaterally  Cardiovascular/Edema:  RRR, S1/S2  Gastrointestinal:  Soft, PD catheter exit site clean   Neurologic:  Nonfocal, AVELAR  Skin:  Warm, dry, no lesions  Other:  No edema    Scheduled Meds:   [START ON 10/21/2020] vancomycin  750 mg Intravenous Q72H    insulin lispro  5 Units Subcutaneous TID     insulin glargine  12 Units Subcutaneous QAM    lactobacillus  1 capsule Oral Q12H    metoprolol  50 mg Oral BID    cefepime  1 g Intravenous Q24H    sodium chloride  1,000 mL Intravenous Once    sodium chloride  1,000 mL Intravenous Once    lidocaine  5 mL Intradermal Once    heparin flush  3 mL Intravenous 2 times per day    lidocaine  5 mL Intradermal Once    heparin flush  1 mL Intravenous 2 times per day    insulin lispro  0-6 Units Subcutaneous TID WC    insulin lispro  0-3 Units Subcutaneous Nightly    epoetin nena-epbx  8,000 Units Subcutaneous Once per day on Mon Wed Fri    [Held by provider] insulin NPH  5 Units Subcutaneous Nightly    sodium chloride flush  10 mL Intravenous 2 times per day    atorvastatin  40 mg Oral Nightly    dilTIAZem  240 mg Oral Daily    gabapentin  100 mg Oral TID    levothyroxine  50 mcg Oral Daily    metOLazone  5 mg Oral Daily    sodium chloride flush  10 mL Intravenous 2 times per day  heparin (porcine)  5,000 Units Subcutaneous 3 times per day     Continuous Infusions:   sodium chloride Stopped (10/16/20 0015)    dextrose Stopped (10/13/20 1304)     PRN Meds:.oxyCODONE **OR** oxyCODONE, sodium chloride flush, heparin flush, sodium chloride flush, heparin flush, acetaminophen, cloNIDine, LORazepam, acetaminophen **OR** acetaminophen, magnesium hydroxide, promethazine **OR** ondansetron, glucose, dextrose, glucagon (rDNA), dextrose      DATA:    CBC:   Lab Results   Component Value Date    WBC 6.6 10/19/2020    RBC 2.91 10/19/2020    HGB 8.9 10/19/2020    HCT 29.9 10/19/2020    .7 10/19/2020    MCH 30.6 10/19/2020    MCHC 29.8 10/19/2020    RDW 21.2 10/19/2020     10/19/2020    MPV 9.9 10/19/2020     CMP:    Lab Results   Component Value Date     10/19/2020    K 5.0 10/19/2020    K 3.7 10/11/2020     10/19/2020    CO2 24 10/19/2020    BUN 59 10/19/2020    CREATININE 7.6 10/19/2020    GFRAA 8 10/19/2020    LABGLOM 8 10/19/2020    GLUCOSE 115 10/19/2020    GLUCOSE 130 05/18/2012    PROT 5.6 10/19/2020    LABALBU 3.6 10/19/2020    LABALBU 4.1 05/18/2012    CALCIUM 9.0 10/19/2020    BILITOT 0.2 10/19/2020    ALKPHOS 125 10/19/2020    AST 56 10/19/2020    ALT 68 10/19/2020     Magnesium:    Lab Results   Component Value Date    MG 2.3 10/14/2020     Phosphorus:    Lab Results   Component Value Date    PHOS 8.9 10/14/2020     Radiology Review:      Chest x-ray October 8, 2020   No evidence of pneumonia or pleural effusion.               BRIEF SUMMARY OF INITIAL CONSULT:    Briefly, Sidney Singh is a 31year-old female with history of ESRD on peritoneal dialysis (CCPD), poorly controlled type I DM with multiple admissions for DKA, gastroparesis, HTN  UTI, who was admitted October 8, 2020 after she presented to the ER reporting feeling unwell, having lower back pain and vomiting. In the ER her glucose was 570 mg/dL.   Her urine showed >20 WBCs.    IMPRESSION/RECOMMENDATIONS:      1. ESRD on peritoneal dialysis/CCPD. To continue same prescription. 2. Hyperkalemia, multifactorial, 2/2 hyperglycemia, ESRD and K supplementation (orange juice). · Potassium level improved today. 3. Coagulase-negative Staphylococcus bacteremia, probably source MediPort, on vancomycin and cefepime per ID; for YULISSA 10/19  4. E faecalis bacteriuria  5. HTN, on metoprolol and diltiazem  6. Type I DM, glucose levels improved  7. Left fifth metacarpal fracture, status post fall  8. Left knee effusion, traumatic  9.  Anemia of CKD, on epoetin alpha 8,000 units tiw    Plan:    · Continue low potassium diet; no orange juice for hypoglycemia  · Continue CCPD  · Continue Bumex 2 mg twice daily  · Continue metolazone 5 mg daily  · Continue diltiazem 240 mg daily  · Continue lisinopril 20 mg daily  · Continue metoprolol 100 mg twice a day  · Continue to monitor labs       Electronically signed by HEBER Christian CNP on 10/19/2020 at 12:09 PM

## 2020-10-19 NOTE — ANESTHESIA POSTPROCEDURE EVALUATION
Department of Anesthesiology  Postprocedure Note    Patient: Krysten Martinez  MRN: [id number]  YOB: 1990  Date of evaluation: 10/19/2020  Time:  4:02 PM     Procedure Summary     Date:  10/19/20 Room / Location:  [address] [place] [address] 01 / [address] [place] [address]    Anesthesia Start:  9581 Anesthesia Stop:  5536    Procedure:  TRANSESOPHAGEAL ECHOCARDIOGRAM WITH BUBBLE STUDY (N/A ) Diagnosis:  (ENDOCARDITIS)    Surgeon:  Yelitza Curtis MD Responsible Provider:  Sakina Monk MD    Anesthesia Type:  MAC ASA Status:  4          Anesthesia Type: MAC    Shilpa Phase I: Shilpa Score: 8    Shilpa Phase II:      Last vitals: Reviewed and per EMR flowsheets.        Anesthesia Post Evaluation    Patient location during evaluation: PACU  Patient participation: complete - patient participated  Level of consciousness: awake  Pain score: 3  Airway patency: patent  Nausea & Vomiting: no nausea  Complications: no  Cardiovascular status: blood pressure returned to baseline  Respiratory status: acceptable  Hydration status: euvolemic

## 2020-10-19 NOTE — PROGRESS NOTES
10/13 Toxin A/B not detected   Peritoneal Body Fluid Cx 10/13 NGTD     Assessment:  · Consulted by Dr. Mansi Noland to dose/monitor vancomycin  · Goal trough level:  15-20 mcg/mL  · Pt is a 31 yof admitted to the hospital for hyperglycemia. She is being initiated on vancomycin for a positive blood culture, final c/s pending.   · Wilton is ESRD on PD, cycles nightly, nephro following  · 10/11: Random level @ 1324 = 20.0 mcg/mL  · 10/12: Random level @ 1511 = 16.4 mcg/mL  · 10/13: Random level @ 1548 = 14.1 mcg/mL  · 10/15: Random level @ 1655 = 10.1 mcg/mL  · 10/16: Random level @ 1400 = 24.6 mcg/mL  · 10/17: Random level @ 1655 = 19.6 mcg/mL  · 10/18: Random level @ 1430 = 17.2 mcg/mL    Plan:  · Start Vancomycin 750 mg IV Q72H  · Check levels PRN  · Follow nephro notes/PD schedule  · Pharmacist will follow and monitor/adjust dosing as necessary      Thank you for the consult,    Marlyn Engle, PharmD, BCPS 10/19/2020 11:54 AM   [phone]

## 2020-10-19 NOTE — PROGRESS NOTES
PRN    Or  acetaminophen, 650 mg, Q6H PRN  magnesium hydroxide, 30 mL, Daily PRN  promethazine, 12.5 mg, Q6H PRN    Or  ondansetron, 4 mg, Q6H PRN  glucose, 15 g, PRN  dextrose, 12.5 g, PRN  glucagon (rDNA), 1 mg, PRN  dextrose, 100 mL/hr, PRN         Objective:    /77   Pulse 81   Temp 97.8 °F (36.6 °C) (Oral)   Resp 12   Ht 5' 4\" (1.626 m)   Wt 151 lb 10.8 oz (68.8 kg)   SpO2 100%   BMI 26.04 kg/m²   General Appearance: alert and oriented to person, place and time and in no acute distress  Skin: warm and dry  Head: normocephalic and atraumatic  Eyes: pupils equal, round, and reactive to light, extraocular eye movements intact, conjunctivae normal  Neck: neck supple and non tender without mass   Pulmonary/Chest: clear to auscultation bilaterally- no wheezes, rales or rhonchi, normal air movement, no respiratory distress  Cardiovascular: normal rate, normal S1 and S2 and no carotid bruits  Abdomen: soft, non-tender, non-distended, normal bowel sounds, no masses or organomegaly. Peritoneal dialysis catheter in place without surrounding erythema or drainage  Extremities: Left upper extremity in gutter splint.   Sensation and finger movement intact  Neurologic: no cranial nerve deficit and speech normal      Recent Labs     10/17/20  0609 10/18/20  0620 10/18/20  1210 10/19/20  0639    137  --  137   K 4.7 5.4* 5.1* 5.0    99  --  101   CO2 20* 22  --  24   BUN 62* 59*  --  59*   CREATININE 7.3* 7.5*  --  7.6*   GLUCOSE 298* 257*  --  115*   CALCIUM 9.4 9.4  --  9.0       Recent Labs     10/17/20  0609 10/18/20  0620 10/19/20  0639   ALKPHOS 105* 117* 125*   PROT 6.3* 6.2* 5.6*   LABALBU 4.3 4.0 3.6   BILITOT 0.2 0.2 0.2   AST 49* 62* 56*   ALT 46* 60* 68*       Recent Labs     10/19/20  0639   WBC 6.6   RBC 2.91*   HGB 8.9*   HCT 29.9*   .7*   MCH 30.6   MCHC 29.8*   RDW 21.2*      MPV 9.9       CBC:   Lab Results   Component Value Date    WBC 6.6 10/19/2020    RBC 2.91 10/19/2020    HGB 8.9 10/19/2020    HCT 29.9 10/19/2020    .7 10/19/2020    MCH 30.6 10/19/2020    MCHC 29.8 10/19/2020    RDW 21.2 10/19/2020     10/19/2020    MPV 9.9 10/19/2020     BMP:    Lab Results   Component Value Date     10/19/2020    K 5.0 10/19/2020    K 3.7 10/11/2020     10/19/2020    CO2 24 10/19/2020    BUN 59 10/19/2020    LABALBU 3.6 10/19/2020    LABALBU 4.1 05/18/2012    CREATININE 7.6 10/19/2020    CALCIUM 9.0 10/19/2020    GFRAA 8 10/19/2020    LABGLOM 8 10/19/2020    GLUCOSE 115 10/19/2020    GLUCOSE 130 05/18/2012        Radiology:   US DUP LOWER EXTREMITIES BILATERAL VENOUS   Final Result   No evidence of DVT in either lower extremity. XR CHEST PORTABLE   Final Result   1. No active pulmonary disease. CT HEAD WO CONTRAST   Final Result   1. No acute intracranial abnormality. XR HAND LEFT (MIN 3 VIEWS)   Final Result   Addendum 1 of 1   ADDENDUM:   Recommend clinical correlation for point tenderness involving the 5th   metacarpal neck. Final      XR KNEE LEFT (3 VIEWS)   Final Result   1. No acute abnormality involving the left knee. XR CHEST PORTABLE   Final Result   No evidence of pneumonia or pleural effusion. Assessment/Plan:  Principal Problem:    Uncontrolled type 1 diabetes mellitus with hyperglycemia, with long-term current use of insulin (HCC)  Active Problems:    ESRD on peritoneal dialysis ([address]    Severe protein-calorie malnutrition ([facility] [address]    Acute cystitis without hematuria    Hypertension    Hypothyroidism    Hyperlipidemia    Hyperglycemia    Nondisplaced fracture of neck of fifth metacarpal bone, left hand, initial encounter for closed fracture    Acute encephalopathy    Effusion of left knee    Hyperkalemia  Resolved Problems:    * No resolved hospital problems. *      1. Acute metabolic encephalopathy   -Resolved  -Likely due to prolonged hypoglycemia and hypotension    2.   Sepsis due to staph epidermidis bacteremia epsis secondary to staph epidermidis bacteremia  -On vancomycin and cefepime per infectious disease  -Scheduled for transesophageal echocardiogram today on 10/19    3. Uncontrolled type 1 diabetes mellitus   -Blood sugars remain extremely labile  -Ultimately she will need continuous glucose monitor and insulin pump. She is following with endocrinology as an outpatient    4. Hyperkalemia   -Continue scheduled peritoneal dialysis and as needed insulin for treatment of acute hyperkalemia    5. UTI   -Treated with fosfomycin on 10/9    6. Left fifth metacarpal fracture  -Splinted by orthopedic surgery  -Nonweightbearing per orthopedic surgery recommendation    7. Chronic low back pain   -Heating pad and Norco as needed    8. Left knee traumatic effusion   -wrapped per orthopedic surgery    9. ESRS on peritoneal dialysis  -Continue nightly peritoneal dialysis regimen per nephrology    10. Hypothyroidism  -Continue levothyroxine    11. Hyperlipidemia  -Continue atorvastatin    Disposition: Once antibiotic regimen is finalized after YULISSA, start discharge planning. May need long-term home IV antibiotic's. Case discussed with ID CNP on the floor. NOTE: This report was transcribed using voice recognition software. Every effort was made to ensure accuracy; however, inadvertent computerized transcription errors may be present.      Electronically signed by Abiola Calixto MD on 10/19/2020 at 10:12 AM

## 2020-10-19 NOTE — FLOWSHEET NOTE
10/19/20 0826   Vitals   Weight 151 lb 10.8 oz (68.8 kg)   Peritoneal Dialysis Catheter Tenckhoff    Placement Date: 06/26/20   Catheter Type: Tenckhoff  Catheter Location: (c)    Status Deaccessed   Site Condition No Complications   Dressing Status Clean;Dry; Intact   Cycler   Ultrafiltration (UF) (mL) 1226 mL     Treatment discontinued per policy and procedure    Patient denies any issues with treatment.    1226 UF  Effluent clear and pale yellow

## 2020-10-19 NOTE — PROGRESS NOTES
H & P reviewed    ID recommended YULISSA to r/o endocarditis. Denies CP or SOB, no dysphagia      Medical / Surgical history: Reviewed    FamilyHistory: Reviewed    Allergies:  Reviewed    Medications: reviewed. Labs/imaging studies: Reviewed. Physical exam:     Vitals:    10/19/20 0820   BP: 117/65   Pulse: 78   Resp: 18   Temp: 97.9 °F (36.6 °C)   SpO2: 94%       In general, this is a well developed, well nourished who appears stated age. awake, alert, cooperative, no apparent distress    HEENT: eyes -conjunctivae pink, Pharynx clear. Neck-  no stridor, no carotid bruit. no jugular venous distention   RESPIRATORY: No accessory muscles use. Lung auscultation - clear to auscultation   CARDIOVASCULAR:     Heart Palpation - no palpable thrills  Heart Ausculation - Regular rate and rhythm, 2/6 systolic murmur, No s3 or rub. No lower extremity edema, no varicosities.  Distal pulses palpable, no clubbing or cyanosis         Impression/Recommendations:    Bacteremia - Risk benefits of YULISSA discussed, agreeable        Brodie Lema MD  10/19/2020  2:51 PM  [facility]) Cardiology

## 2020-10-19 NOTE — CARE COORDINATION
I called [facility] to cancel the 6 appointments the patient had scheduled.     [phone]

## 2020-10-19 NOTE — ANESTHESIA PRE PROCEDURE
Department of Anesthesiology  Preprocedure Note       Name:  Neeraj Figueroa   Age:  31 y.o.  :  1990                                          MRN:  [id number]         Date:  10/19/2020      Surgeon: No Cheng):  Helen Stewart MD    Procedure: TRANSOESOPHAGEAL ECHO CARDIOGRAM    Medications prior to admission:   Prior to Admission medications    Medication Sig Start Date End Date Taking? Authorizing Provider   insulin NPH (NOVOLIN N) 100 UNIT/ML injection vial Inject 6 Units into the skin nightly 10/9/20   Fortino Wolf MD   Insulin Syringes, Disposable, U-100 0.3 ML MISC To use daily 10/9/20   Fortino Wolf MD   gabapentin (NEURONTIN) 100 MG capsule Take 100 mg by mouth 3 times daily.     Historical Provider, MD   bumetanide (BUMEX) 2 MG tablet Take 2 mg by mouth 2 times daily    Historical Provider, MD   metOLazone (ZAROXOLYN) 5 MG tablet Take 5 mg by mouth daily    Historical Provider, MD   vitamin D (ERGOCALCIFEROL) 1.25 MG (44103 UT) CAPS capsule Take 50,000 Units by mouth once a week Saturday    Historical Provider, MD   Multiple Vitamins-Minerals (RENAPLEX-D) TABS Take 1 tablet by mouth daily    Historical Provider, MD   cloNIDine (CATAPRES) 0.1 MG tablet Take 0.1 mg by mouth 2 times daily as needed for High Blood Pressure    Historical Provider, MD   influenza virus trivalent vaccine (FLUZONE) injection Inject 0.5 mLs into the muscle once Given 2020 with doctor    Historical Provider, MD   insulin aspart (NOVOLOG FLEXPEN) 100 UNIT/ML injection pen Inject 3 Units into the skin 3 times daily (before meals) *Plus Sliding Scale*    Historical Provider, MD   insulin glargine (LANTUS SOLOSTAR) 100 UNIT/ML injection pen Inject 10 Units into the skin 2 times daily 20   Derek Montero DO   Ferric Citrate (AURYXIA) 1  MG(Fe) TABS Take 1 tablet by mouth 3 times daily (with meals)  9/3/20   Historical Provider, MD   metoclopramide (REGLAN) 5 MG tablet Take 1 tablet by mouth 3 times daily (with meals) 9/3/20   Fatoumata Santana MD   lisinopril (PRINIVIL;ZESTRIL) 20 MG tablet Take 1 tablet by mouth daily 9/3/20   Fatoumata Santana MD   levothyroxine (SYNTHROID) 50 MCG tablet Take 1 tablet by mouth Daily 9/3/20   Fatoumata Santana MD   atorvastatin (LIPITOR) 40 MG tablet Take 1 tablet by mouth nightly 9/3/20   Fatoumata Santana MD   metoprolol (LOPRESSOR) 100 MG tablet Take 1 tablet by mouth 2 times daily 9/3/20   Fatoumata Santana MD   dilTIAZem (CARDIZEM CD) 240 MG extended release capsule Take 1 capsule by mouth daily 9/3/20   Fatoumata Santana MD   insulin aspart (NOVOLOG) 100 UNIT/ML injection vial Inject 0-10 Units into the skin 3 times daily (before meals) *Per Sliding Scale* 9/3/20   Fatoumata Santana MD   potassium chloride (KLOR-CON) 20 MEQ packet Take 20 mEq by mouth daily 9/3/20   Fatoumata Santana MD   blood glucose test strips (ASCENSIA AUTODISC VI;ONE TOUCH ULTRA TEST VI) strip Indications: 5x a day Freestyle Lite -Tests 5 times daily and as needed for low glucose. E10.10 7/29/20   Fatoumata Santana MD   Lancets Thin MISC Indications: cks 5xa a day cks 5xa a day 7/29/20   Fatoumata Santana MD   epoetin nena-epbx (RETACRIT) 4000 UNIT/ML SOLN injection Inject 2 mLs into the skin three times a week Per Nephrology 2/17/20   HEBER Son - CNP   LORazepam (ATIVAN) 0.5 MG tablet Take 0.5 mg by mouth 2 times daily as needed for Anxiety. Historical Provider, MD   glucose (GLUTOSE) 40 % GEL Take 15 g by mouth as needed 4/1/16   Elizabeth Sheppard MD   Insulin Syringe-Needle U-100 (INSULIN SYRINGE .5CC/30GX1/2\") 30G X 1/2\" 0.5 ML MISC by Does not apply route. Indications: uses 6-7 a day    Historical Provider, MD       Current medications:    No current facility-administered medications for this visit.       Current Outpatient Medications   Medication Sig Dispense Refill    insulin NPH (NOVOLIN N) 100 UNIT/ML injection vial Inject 6 Units into the skin nightly 1 vial 5    Insulin Syringes, Disposable, U-100 0.3 ML MISC To use daily 100 each 2 Facility-Administered Medications Ordered in Other Visits   Medication Dose Route Frequency Provider Last Rate Last Dose    insulin lispro (HUMALOG) injection vial 5 Units  5 Units Subcutaneous TID WC Evin Lopez DO   5 Units at 10/18/20 0906    insulin glargine (LANTUS) injection vial 12 Units  12 Units Subcutaneous QAM Evin Lopez DO   12 Units at 10/18/20 0900    oxyCODONE (ROXICODONE) immediate release tablet 5 mg  5 mg Oral Q4H PRN Evin Lopez DO        Or    oxyCODONE (ROXICODONE) immediate release tablet 10 mg  10 mg Oral Q4H PRN Evin Lopez DO   10 mg at 10/19/20 0622    lactobacillus (CULTURELLE) capsule 1 capsule  1 capsule Oral Q12H Loulou Motley MD   1 capsule at 10/18/20 2108    metoprolol tartrate (LOPRESSOR) tablet 50 mg  50 mg Oral BID Evin Lopez DO   50 mg at 10/19/20 0855    cefepime (MAXIPIME) 1 g IVPB extended (mini-bag)  1 g Intravenous Q24H Loulou Motley MD   Stopped at 10/19/20 0113    And    0.9 % sodium chloride infusion   Intravenous Q24H Loulou Motley MD   Stopped at 10/16/20 0015    0.9 % sodium chloride bolus  1,000 mL Intravenous Once HEBER Sanchez CNP        0.9 % sodium chloride bolus  1,000 mL Intravenous Once HEBER Sanchez CNP        lidocaine 1 % injection 5 mL  5 mL Intradermal Once [employer], DO        sodium chloride flush 0.9 % injection 10 mL  10 mL Intravenous PRN Evin Lopez DO        heparin flush 100 UNIT/ML injection 300 Units  3 mL Intravenous 2 times per day Evin Lopez DO   300 Units at 10/17/20 1003    heparin flush 100 UNIT/ML injection 300 Units  3 mL Intracatheter PRN Evin Lopez DO   300 Units at 10/19/20 0634    lidocaine 1 % injection 5 mL  5 mL Intradermal Once [employer], DO        sodium chloride flush 0.9 % injection 10 mL  10 mL Intravenous PRN Evin Lopez DO        heparin flush 100 UNIT/ML injection 100 Units  1 mL Intravenous 2 times per day Pardeep Hotter, DO   100 Units at 10/18/20 2109    heparin flush 100 UNIT/ML injection 100 Units  1 mL Intracatheter PRN Pardeep Hotter, DO        vancomycin (VANCOCIN) intermittent dosing (placeholder)   Other RX Placeholder Tara Rush MD        insulin lispro (HUMALOG) injection vial 0-6 Units  0-6 Units Subcutaneous TID WC Tara Rush MD   2 Units at 10/18/20 0901    insulin lispro (HUMALOG) injection vial 0-3 Units  0-3 Units Subcutaneous Nightly Tara Rush MD   2 Units at 10/16/20 2103    epoetin nena-epbx (RETACRIT) injection 8,000 Units  8,000 Units Subcutaneous Once per day on Mon Wed Fri HEBER Rosario  CNP   8,000 Units at 10/16/20 0901    [Held by provider] insulin NPH (HUMULIN N;NOVOLIN N) injection vial 5 Units  5 Units Subcutaneous Nightly Tara Rush MD   5 Units at 10/10/20 2202    sodium chloride flush 0.9 % injection 10 mL  10 mL Intravenous 2 times per day Tara Rush MD   10 mL at 10/15/20 0255    acetaminophen (TYLENOL) tablet 650 mg  650 mg Oral Q4H PRN Tara Rush MD        atorvastatin (LIPITOR) tablet 40 mg  40 mg Oral Nightly Madhu Corrigan MD   40 mg at 10/18/20 2108    cloNIDine (CATAPRES) tablet 0.1 mg  0.1 mg Oral BID PRN Madhu Corrigan MD        dilTIAZem (CARDIZEM CD) extended release capsule 240 mg  240 mg Oral Daily Madhu Corrigan MD   240 mg at 10/19/20 0855    gabapentin (NEURONTIN) capsule 100 mg  100 mg Oral TID Madhu Corrigan MD   100 mg at 10/19/20 0855    levothyroxine (SYNTHROID) tablet 50 mcg  50 mcg Oral Daily Madhu Corrigan MD   50 mcg at 10/19/20 3834    LORazepam (ATIVAN) tablet 0.5 mg  0.5 mg Oral BID PRN Madhu Corrigan MD   0.5 mg at 10/15/20 1802    metOLazone (ZAROXOLYN) tablet 5 mg  5 mg Oral Daily Madhu Corrigan MD   5 mg at 10/19/20 0855    sodium chloride flush 0.9 % injection 10 mL  10 mL Intravenous 2 times per day Madhu Corrigan MD   10 mL at 10/19/20 0822    acetaminophen (TYLENOL) tablet 650 mg  650 mg Oral Q6H PRN Laly Song MD        Or    acetaminophen (TYLENOL) suppository 650 mg  650 mg Rectal Q6H PRN Laly Song MD        magnesium hydroxide (MILK OF MAGNESIA) 400 MG/5ML suspension 30 mL  30 mL Oral Daily PRN Laly Song MD        promethazine (PHENERGAN) tablet 12.5 mg  12.5 mg Oral Q6H PRN Patti Armstrong MD   12.5 mg at 10/11/20 0808    Or    ondansetron (ZOFRAN) injection 4 mg  4 mg Intravenous Q6H PRN Patti Armstrong MD   4 mg at 10/18/20 1747    heparin (porcine) injection 5,000 Units  5,000 Units Subcutaneous 3 times per day Laly Song MD   Stopped at 10/19/20 0609    glucose (GLUTOSE) 40 % oral gel 15 g  15 g Oral PRN Patti Armstrong MD   15 g at 10/10/20 0624    dextrose 50 % IV solution  12.5 g Intravenous PRN Laly Song MD   12.5 g at 10/18/20 1208    glucagon (rDNA) injection 1 mg  1 mg Intramuscular PRN Laly Song MD        dextrose 5 % solution  100 mL/hr Intravenous PRN Laly Song MD   Stopped at 10/13/20 1304       Allergies:     Allergies   Allergen Reactions    Toradol [Ketorolac Tromethamine] Anaphylaxis and Hives       Problem List:    Patient Active Problem List   Diagnosis Code    High-risk pregnancy O09.90    Previous stillbirth or demise, antepartum O09.299    Non compliance w medication regimen Z91.14    Dyspnea on exertion R06.00    Tobacco smoking complicating pregnancy C84.988    Drug use complicating pregnancy in third trimester O99.323    MTHFR mutation ([facility] E72.12    Poorly controlled type 1 diabetes mellitus ([facility] E10.65    Diabetes mellitus type 1, uncontrolled ([facility] E10.65    Previous  delivery affecting pregnancy, antepartum O34.219    Oligohydramnios O41.00X0    Kidney stones N20.0    Menses painful N94.6    Generalized abdominal pain R10.84    Opioid abuse, episodic ([facility] F11.10    Tobacco use Z72.0    Diabetic ketoacidosis without coma associated with type 1 diabetes mellitus ([facility] E10.10    Septicemia ([facility] A41.9    Hypoglycemia E16.2    Hypertension I10    Type 1 diabetes mellitus with other specified complication ([facility]) B29.08    Hyponatremia E87.1    Other specified diabetes mellitus with other specified complication ([facility] X29.64    First trimester pregnancy Z34.91    Acute electrocardiogram changes R94.31    Acute kidney injury superimposed on CKD ([facility]) N17.9, N18.9    Diabetic gastroparesis associated with type 1 diabetes mellitus ([facility] E10.43, K31.84    Diarrhea in adult patient R19.7    Generalized abdominal pain R10.84    Acute kidney injury superimposed on chronic kidney disease ([facility]) N17.9, N18.9    Acute gastroenteritis K52.9    High anion gap metabolic acidosis J59.2    Iron deficiency anemia D50.9    Headache R51.9    Acute respiratory failure with hypoxia ([facility]) J96.01    Aspiration pneumonia of both lungs ([facility]) J69.0    Acute cholecystitis K81.0    Chest pain, unspecified R07.9    Sinus tachycardia R00.0    Orthostatic hypotension I95.1    Bladder dysfunction N31.9    C. difficile colitis A04.72    Altered mental status R41.82    Acute heart failure with preserved ejection fraction ([facility]) I50.31    Hypervolemia E87.70    Chronic kidney disease N18.9    Hypokalemia E87.6    Cellulitis of right hand L03. 113    Severe protein-calorie malnutrition ([facility] E43    Chronic renal impairment N18.9    Acute congestive heart failure ([facility]) I50.9    Hypertensive encephalopathy I67.4    Intractable vomiting with nausea R11.2    Intractable nausea and vomiting R11.2    Hypoalbuminemia E88.09    ESRD (end stage renal disease) ([facility] N18.6    Uncontrolled type 1 diabetes mellitus with hyperglycemia, with long-term current use of insulin ([facility]) E10.65    ESRD on peritoneal dialysis ([facility]) N18.6, Z99.2    Hypothyroidism E03.9    Hyperlipidemia E78.5    Acute cystitis without hematuria N30.00    Hyperglycemia R73.9    Nondisplaced fracture of neck of fifth metacarpal bone, left hand, GASTROINTESTINAL ENDOSCOPY N/A 10/11/2019    EGD ESOPHAGOGASTRODUODENOSCOPY performed by Humaira Santos DO at [address] History:    Social History     Tobacco Use    Smoking status: Current Every Day Smoker     Packs/day: 0.50     Years: 6.00     Pack years: 3.00     Types: Cigarettes    Smokeless tobacco: Current User   Substance Use Topics    Alcohol use: No                                Ready to quit: Not Answered  Counseling given: Not Answered      Vital Signs (Current): There were no vitals filed for this visit. BP Readings from Last 3 Encounters:   10/19/20 117/65   10/07/20 (!) 169/88   10/05/20 119/84       NPO Status:                           Pt instructed NPO at 0000                                                      BMI:   Wt Readings from Last 3 Encounters:   10/19/20 151 lb 10.8 oz (68.8 kg)   09/03/20 129 lb (58.5 kg)   07/29/20 141 lb 8 oz (64.2 kg)     There is no height or weight on file to calculate BMI.    CBC:   Lab Results   Component Value Date    WBC 6.6 10/19/2020    RBC 2.91 10/19/2020    HGB 8.9 10/19/2020    HCT 29.9 10/19/2020    .7 10/19/2020    RDW 21.2 10/19/2020     10/19/2020       CMP:   Lab Results   Component Value Date     10/19/2020    K 5.0 10/19/2020    K 3.7 10/11/2020     10/19/2020    CO2 24 10/19/2020    BUN 59 10/19/2020    CREATININE 7.6 10/19/2020    GFRAA 8 10/19/2020    LABGLOM 8 10/19/2020    GLUCOSE 115 10/19/2020    GLUCOSE 130 05/18/2012    PROT 5.6 10/19/2020    CALCIUM 9.0 10/19/2020    BILITOT 0.2 10/19/2020    ALKPHOS 125 10/19/2020    AST 56 10/19/2020    ALT 68 10/19/2020       POC Tests: No results for input(s): POCGLU, POCNA, POCK, POCCL, POCBUN, POCHEMO, POCHCT in the last 72 hours.     Coags:   Lab Results   Component Value Date    PROTIME 11.7 08/10/2020    PROTIME 13.6 08/14/2011    INR 1.0 08/10/2020    APTT 28.3 08/10/2020       HCG (If Applicable):   Lab Results   Component Value Date    PREGTESTUR NEGATIVE 10/08/2020    HCG 39931.4 (H) 06/26/2013        ABGs:   Lab Results   Component Value Date    PHART 7.345 07/20/2012    PO2ART 91.5 10/29/2019    RVX8GSE 35.0 10/29/2019    BYC9SUG 22.1 10/29/2019    H9WPEUQD 97.7 09/08/2012        Type & Screen (If Applicable):  Lab Results   Component Value Date    LABABO O 12/29/2011    [address] POSITIVE 12/29/2011       Anesthesia Evaluation  Patient summary reviewed and Nursing notes reviewed no history of anesthetic complications:   Airway: Mallampati: II  TM distance: >3 FB   Neck ROM: full  Mouth opening: > = 3 FB Dental:          Pulmonary: breath sounds clear to auscultation  (+) pneumonia: resolved,  current smoker    (-) shortness of breath                           Cardiovascular:  Exercise tolerance: good (>4 METS),   (+) hypertension: moderate, CHF:, ARIAS:,       ECG reviewed  Rhythm: regular  Rate: normal  Echocardiogram reviewed         Beta Blocker:  Dose within 24 Hrs      ROS comment: EKG: Normal Sinus Rhythm 87. ECHO:   Trileaflet aortic valve. Normal leaflet mobility. No aortic stenosis. No aortic regurgitation. Normal left atrium. Interatrial septum not well visualized. Normal left ventricular chamber size. Normal left ventricular systolic function. Visually estimated LVEF is 60 %. No wall motion abnormalities. Normal left atrial pressure. Normal aortic root and ascending aorta. Physiologic and/or trace mitral regurgitation is present. No evidence of hemodynamically significant mitral stenosis. No evidence for hemodynamically significant pericardial effusion. Normal right ventricle structure and function. Normal tricuspid valve structure and function.      Neuro/Psych:   (+) headaches:, psychiatric history:depression/anxiety             GI/Hepatic/Renal:   (+) renal disease (DKA): kidney stones, ESRD and dialysis,          ROS comment: Significantly delayed gastric emptying with persistent uptake within the esophagus    . Endo/Other:    (+) DiabetesType I DM, poorly controlled, using insulin, blood dyscrasia (MTHFR mutation ([facility])): anemia:., .                  ROS comment: Port due to poor IV access  Opiod abuse Abdominal:           Vascular: negative vascular ROS. Anesthesia Plan      MAC     ASA 4       Induction: intravenous. Anesthetic plan and risks discussed with patient. Plan discussed with CRNA.     Attending anesthesiologist reviewed and agrees with Pre Eval content

## 2020-10-19 NOTE — PROGRESS NOTES
[facility]  [facility]  Progress Note    NAME:Wilton Flores  1990  DATE OF SERVICE:10/19/20    Pt was seen FACE TO FACE FOR atbx    SUBJECTIVE:  Chief Complaint   Patient presents with    Hyperglycemia     blood sugar high today vomiting back pain   has nausea - dry heaving this am  Has no diarrhea    Afebrile   More awake- + abdominal pain. Patient is tolerating medications. No reported adverse drug reactions. Review of systems:  As stated above in the chief complaint, otherwise negative.     Medications:  Scheduled Meds:   [START ON 10/21/2020] vancomycin  750 mg Intravenous Q72H    insulin lispro  5 Units Subcutaneous TID     insulin glargine  12 Units Subcutaneous QAM    lactobacillus  1 capsule Oral Q12H    metoprolol  50 mg Oral BID    cefepime  1 g Intravenous Q24H    sodium chloride  1,000 mL Intravenous Once    sodium chloride  1,000 mL Intravenous Once    lidocaine  5 mL Intradermal Once    heparin flush  3 mL Intravenous 2 times per day    lidocaine  5 mL Intradermal Once    heparin flush  1 mL Intravenous 2 times per day    insulin lispro  0-6 Units Subcutaneous TID     insulin lispro  0-3 Units Subcutaneous Nightly    epoetin nena-epbx  8,000 Units Subcutaneous Once per day on Mon Wed Fri    [Held by provider] insulin NPH  5 Units Subcutaneous Nightly    sodium chloride flush  10 mL Intravenous 2 times per day    atorvastatin  40 mg Oral Nightly    dilTIAZem  240 mg Oral Daily    gabapentin  100 mg Oral TID    levothyroxine  50 mcg Oral Daily    metOLazone  5 mg Oral Daily    sodium chloride flush  10 mL Intravenous 2 times per day    heparin (porcine)  5,000 Units Subcutaneous 3 times per day     Continuous Infusions:   sodium chloride Stopped (10/16/20 0015)    dextrose Stopped (10/13/20 1304)     PRN Meds:oxyCODONE **OR** oxyCODONE, sodium chloride flush, heparin flush, sodium chloride flush, heparin flush, acetaminophen, cloNIDine, LORazepam, acetaminophen **OR** acetaminophen, magnesium hydroxide, promethazine **OR** ondansetron, glucose, dextrose, glucagon (rDNA), dextrose    OBJECTIVE:  /65   Pulse 78   Temp 97.9 °F (36.6 °C) (Oral)   Resp 18   Ht 5' 4\" (1.626 m)   Wt 151 lb 10.8 oz (68.8 kg)   SpO2 94%   BMI 26.04 kg/m²   Temp  Av.8 °F (36.6 °C)  Min: 97.6 °F (36.4 °C)  Max: 97.9 °F (36.6 °C)  Constitutional:  The patient is awake, ALERT- nauseated this am   Skin:    Warm and dry. No rashes were noted. HEENT:      AT/NC. Chest:   No use of accessory muscles to breathe. Symmetrical expansion. Cardiovascular:  S1 and S2 are rhythmic and regular. No murmurs appreciated. Abdomen:   Positive bowel sounds to auscultation. ++ abdominal pain. Pd CATH distended  Extremities:    edema.  LEFT UE ACED  CNS    NAD  Lines: LEFT MEDIPORT     Radiology:  Laboratory and Tests Review:  Lab Results   Component Value Date    WBC 6.6 10/19/2020    WBC 7.3 10/16/2020    WBC 6.8 10/13/2020    HGB 8.9 (L) 10/19/2020    HCT 29.9 (L) 10/19/2020    .7 (H) 10/19/2020     10/19/2020     No results found for: RUST  Lab Results   Component Value Date    ALT 68 (H) 10/19/2020    AST 56 (H) 10/19/2020    ALKPHOS 125 (H) 10/19/2020    BILITOT 0.2 10/19/2020     Lab Results   Component Value Date     10/19/2020    K 5.0 10/19/2020    K 3.7 10/11/2020     10/19/2020    CO2 24 10/19/2020    BUN 59 10/19/2020    CREATININE 7.6 10/19/2020    CREATININE 7.5 10/18/2020    CREATININE 7.3 10/17/2020    GFRAA 8 10/19/2020    LABGLOM 8 10/19/2020    GLUCOSE 115 10/19/2020    GLUCOSE 130 2012    PROT 5.6 10/19/2020    LABALBU 3.6 10/19/2020    LABALBU 4.1 2012    CALCIUM 9.0 10/19/2020    BILITOT 0.2 10/19/2020    ALKPHOS 125 10/19/2020    AST 56 10/19/2020    ALT 68 10/19/2020     Lab Results   Component Value Date    CRP 43.1 (H) 2019    CRP 0.3 10/02/2019    CRP 0.4 2017     Lab Results Component Value Date    SEDRATE 19 10/13/2020    SEDRATE 135 (H) 11/01/2019    SEDRATE 14 09/28/2017       Microbiology:   No results for input(s): COVID19 in the last 72 hours. Lab Results   Component Value Date    BLOODCULT2 5 Days no growth 07/18/2020    BLOODCULT2 5 Days- no growth 02/09/2020    BLOODCULT2 5 Days- no growth 02/08/2020    ORG Staphylococcus epidermidis 10/08/2020    ORG Staphylococcus epidermidis 10/08/2020    ORG Enterococcus faecalis 10/08/2020     WOUND/ABSCESS   Date Value Ref Range Status   12/11/2014 (A)  Final    Mixed yulisa also isolated, including:  Alpha hemolytic Strep species  Corynebacteria     12/11/2014 Heavy growth  Final     MRSA Culture Only   Date Value Ref Range Status   10/13/2020 Methicillin resistant Staph aureus not isolated  Final     ASSESSMENT:  Cons bacteremia has left MediPort   Rule out line infection     E. faecalis bacteruria   CKD PD CATH has abd pain check cx  S/p fall -  traumatic nondisplaced fracture involving the 5th    metacarpal neck       Plan:   · Continue vancomycin- next dose scheduled for 10/21/2020  · Continue  Cefepime   · For YULISSA today  · Monitor labs- lfts elevated   · Follow-up cultures    · Monitor labs    Imaging and labs were reviewed per medical records. The patient was educated about the diagnosis, prognosis, indications, risks and benefits of treatment. An opportunity to ask questions was given to the patient/FAMILY. Thank you for involving me in the care of [address] [place] [address] I will continue to follow. Please do not hesitate to call for any questions or concerns. Electronically signed by HEBER Núñez on 10/19/2020 at 12:22 PM   As above    This is a face to face encounter with [address] [place] [address] on 10/19/20. ID FOLLOWING FOR BACTERMIA.   I have performed and participated in the history, exam, medical decision making, and  POC  with the NURSE PRACTITIONER and provided the instruction and education regarding this patient's care. Imaging and labs were reviewed per medical records and any ID pertinent labs were addressed with the patient. The patient was educated about the diagnosis, prognosis, indications, risks and benefits of treatment. Pt had the opportunity to ask questions. All questions were answered. PT STILL WITH NAUSEA   FOR YULISSA TODAY   AFEBRILE WBC6.6   CKD/PD  CONS BACTEREMIA VANCO DAY 10  Thank you for involving me in the care of [address] [place] [address]. Please do not hesitate to call for any questions or concerns.     Electronically signed by Miri Muñoz MD on 10/19/2020 at 3:56 PM    Phone [phone]  Fax [phone]

## 2020-10-20 ENCOUNTER — APPOINTMENT (OUTPATIENT)
Dept: CT IMAGING | Age: 30
DRG: 721 | End: 2020-10-20
Payer: COMMERCIAL

## 2020-10-20 LAB
ALBUMIN SERPL-MCNC: 3.8 G/DL (ref 3.5–5.2)
ALP BLD-CCNC: 154 U/L (ref 35–104)
ALT SERPL-CCNC: 80 U/L (ref 0–32)
ANION GAP SERPL CALCULATED.3IONS-SCNC: 16 MMOL/L (ref 7–16)
APPEARANCE FLUID: CLEAR
APTT: 40.1 SEC (ref 24.5–35.1)
AST SERPL-CCNC: 77 U/L (ref 0–31)
BILIRUB SERPL-MCNC: 0.2 MG/DL (ref 0–1.2)
BUN BLDV-MCNC: 54 MG/DL (ref 6–20)
CALCIUM SERPL-MCNC: 9.1 MG/DL (ref 8.6–10.2)
CELL COUNT FLUID TYPE: NORMAL
CHLORIDE BLD-SCNC: 98 MMOL/L (ref 98–107)
CO2: 22 MMOL/L (ref 22–29)
COLOR FLUID: COLORLESS
CREAT SERPL-MCNC: 7.7 MG/DL (ref 0.5–1)
GFR AFRICAN AMERICAN: 8
GFR NON-AFRICAN AMERICAN: 8 ML/MIN/1.73
GLUCOSE BLD-MCNC: 239 MG/DL (ref 74–99)
HCT VFR BLD CALC: 30.9 % (ref 34–48)
HEMOGLOBIN: 9.3 G/DL (ref 11.5–15.5)
MCH RBC QN AUTO: 31.4 PG (ref 26–35)
MCHC RBC AUTO-ENTMCNC: 30.1 % (ref 32–34.5)
MCV RBC AUTO: 104.4 FL (ref 80–99.9)
METER GLUCOSE: 101 MG/DL (ref 74–99)
METER GLUCOSE: 122 MG/DL (ref 74–99)
METER GLUCOSE: 135 MG/DL (ref 74–99)
METER GLUCOSE: 149 MG/DL (ref 74–99)
METER GLUCOSE: 163 MG/DL (ref 74–99)
METER GLUCOSE: 221 MG/DL (ref 74–99)
METER GLUCOSE: 381 MG/DL (ref 74–99)
METER GLUCOSE: 408 MG/DL (ref 74–99)
MONOCYTE, FLUID: 67 %
NEUTROPHIL, FLUID: 33 %
NUCLEATED CELLS FLUID: 9 /UL
PDW BLD-RTO: 21 FL (ref 11.5–15)
PLATELET # BLD: 272 E9/L (ref 130–450)
PMV BLD AUTO: 10.7 FL (ref 7–12)
POTASSIUM SERPL-SCNC: 5.3 MMOL/L (ref 3.5–5)
RBC # BLD: 2.96 E12/L (ref 3.5–5.5)
RBC FLUID: <2000 /UL
REASON FOR REJECTION: NORMAL
REJECTED TEST: NORMAL
SARS-COV-2, NAAT: NOT DETECTED
SODIUM BLD-SCNC: 136 MMOL/L (ref 132–146)
TOTAL PROTEIN: 6.2 G/DL (ref 6.4–8.3)
WBC # BLD: 10.4 E9/L (ref 4.5–11.5)

## 2020-10-20 PROCEDURE — 85730 THROMBOPLASTIN TIME PARTIAL: CPT

## 2020-10-20 PROCEDURE — 6370000000 HC RX 637 (ALT 250 FOR IP): Performed by: INTERNAL MEDICINE

## 2020-10-20 PROCEDURE — 87205 SMEAR GRAM STAIN: CPT

## 2020-10-20 PROCEDURE — U0002 COVID-19 LAB TEST NON-CDC: HCPCS

## 2020-10-20 PROCEDURE — 70450 CT HEAD/BRAIN W/O DYE: CPT

## 2020-10-20 PROCEDURE — 6360000002 HC RX W HCPCS: Performed by: INTERNAL MEDICINE

## 2020-10-20 PROCEDURE — 85027 COMPLETE CBC AUTOMATED: CPT

## 2020-10-20 PROCEDURE — 93312 ECHO TRANSESOPHAGEAL: CPT | Performed by: INTERNAL MEDICINE

## 2020-10-20 PROCEDURE — 82962 GLUCOSE BLOOD TEST: CPT

## 2020-10-20 PROCEDURE — 87070 CULTURE OTHR SPECIMN AEROBIC: CPT

## 2020-10-20 PROCEDURE — 2580000003 HC RX 258: Performed by: INTERNAL MEDICINE

## 2020-10-20 PROCEDURE — 80053 COMPREHEN METABOLIC PANEL: CPT

## 2020-10-20 PROCEDURE — 99233 SBSQ HOSP IP/OBS HIGH 50: CPT | Performed by: INTERNAL MEDICINE

## 2020-10-20 PROCEDURE — 89051 BODY FLUID CELL COUNT: CPT

## 2020-10-20 PROCEDURE — 6370000000 HC RX 637 (ALT 250 FOR IP): Performed by: SPECIALIST

## 2020-10-20 PROCEDURE — 6360000002 HC RX W HCPCS: Performed by: NURSE PRACTITIONER

## 2020-10-20 PROCEDURE — 2580000003 HC RX 258: Performed by: SPECIALIST

## 2020-10-20 PROCEDURE — 36415 COLL VENOUS BLD VENIPUNCTURE: CPT

## 2020-10-20 PROCEDURE — 6360000002 HC RX W HCPCS: Performed by: SPECIALIST

## 2020-10-20 PROCEDURE — 1200000000 HC SEMI PRIVATE

## 2020-10-20 RX ORDER — HEPARIN SODIUM 1000 [USP'U]/ML
80 INJECTION, SOLUTION INTRAVENOUS; SUBCUTANEOUS ONCE
Status: COMPLETED | OUTPATIENT
Start: 2020-10-20 | End: 2020-10-20

## 2020-10-20 RX ORDER — HEPARIN SODIUM 10000 [USP'U]/100ML
18 INJECTION, SOLUTION INTRAVENOUS CONTINUOUS
Status: DISCONTINUED | OUTPATIENT
Start: 2020-10-20 | End: 2020-10-24 | Stop reason: HOSPADM

## 2020-10-20 RX ORDER — LORAZEPAM 2 MG/ML
0.5 INJECTION INTRAMUSCULAR ONCE
Status: COMPLETED | OUTPATIENT
Start: 2020-10-20 | End: 2020-10-20

## 2020-10-20 RX ORDER — HEPARIN SODIUM 1000 [USP'U]/ML
80 INJECTION, SOLUTION INTRAVENOUS; SUBCUTANEOUS PRN
Status: DISCONTINUED | OUTPATIENT
Start: 2020-10-20 | End: 2020-10-24 | Stop reason: HOSPADM

## 2020-10-20 RX ORDER — HEPARIN SODIUM 1000 [USP'U]/ML
40 INJECTION, SOLUTION INTRAVENOUS; SUBCUTANEOUS PRN
Status: DISCONTINUED | OUTPATIENT
Start: 2020-10-20 | End: 2020-10-24 | Stop reason: HOSPADM

## 2020-10-20 RX ADMIN — LORAZEPAM 0.5 MG: 2 INJECTION INTRAMUSCULAR; INTRAVENOUS at 14:00

## 2020-10-20 RX ADMIN — GABAPENTIN 100 MG: 100 CAPSULE ORAL at 11:51

## 2020-10-20 RX ADMIN — CEFEPIME HYDROCHLORIDE 1 G: 1 INJECTION, POWDER, FOR SOLUTION INTRAMUSCULAR; INTRAVENOUS at 11:52

## 2020-10-20 RX ADMIN — DEXTROSE MONOHYDRATE 12.5 G: 25 INJECTION, SOLUTION INTRAVENOUS at 18:09

## 2020-10-20 RX ADMIN — HEPARIN SODIUM 5000 UNITS: 5000 INJECTION INTRAVENOUS; SUBCUTANEOUS at 06:02

## 2020-10-20 RX ADMIN — INSULIN LISPRO 5 UNITS: 100 INJECTION, SOLUTION INTRAVENOUS; SUBCUTANEOUS at 12:07

## 2020-10-20 RX ADMIN — HEPARIN SODIUM 5930 UNITS: 1000 INJECTION, SOLUTION INTRAVENOUS; SUBCUTANEOUS at 14:08

## 2020-10-20 RX ADMIN — INSULIN LISPRO 6 UNITS: 100 INJECTION, SOLUTION INTRAVENOUS; SUBCUTANEOUS at 12:08

## 2020-10-20 RX ADMIN — METOLAZONE 5 MG: 2.5 TABLET ORAL at 11:51

## 2020-10-20 RX ADMIN — LEVOTHYROXINE SODIUM 50 MCG: 50 TABLET ORAL at 05:53

## 2020-10-20 RX ADMIN — DILTIAZEM HYDROCHLORIDE 240 MG: 240 CAPSULE, COATED, EXTENDED RELEASE ORAL at 11:51

## 2020-10-20 RX ADMIN — METOPROLOL TARTRATE 50 MG: 50 TABLET, FILM COATED ORAL at 12:09

## 2020-10-20 RX ADMIN — Medication 10 ML: at 11:51

## 2020-10-20 RX ADMIN — HEPARIN SODIUM AND DEXTROSE 18 UNITS/KG/HR: 10000; 5 INJECTION INTRAVENOUS at 14:08

## 2020-10-20 RX ADMIN — Medication 100 UNITS: at 11:50

## 2020-10-20 RX ADMIN — Medication 1 CAPSULE: at 11:51

## 2020-10-20 ASSESSMENT — PAIN SCALES - GENERAL
PAINLEVEL_OUTOF10: 0

## 2020-10-20 NOTE — PROGRESS NOTES
Pharmacy Consultation Note  (Antibiotic Dosing and Monitoring)    Initial consult date: 10/10/20  Consulting physician: Dr. Justen Wilhelm  Drug(s): Vancomycin  Indication: Positive blood culture    Ht Readings from Last 1 Encounters:   10/16/20 5' 4\" (1.626 m)     Wt Readings from Last 1 Encounters:   10/20/20 163 lb 5.8 oz (74.1 kg)         Age/  Gender IBW DW  Allergy Information   31 y.o.     female 54.7 kg 58.1 kg  Toradol [ketorolac tromethamine]                 Date  WBC CCPD Drug/Dose Time   Given Level(s)   (Time) Comments   10/10  (#1) 6.8 QHS  Vancomycin 1,000 mg IV x 1 1822     10/11  (#2) 7.7 QHS No dose -- 20.0 mcg/mL @ 1324    10/12  (#3) -- QHS No dose -- 16.4 mcg/mL @ 1511    10/13  (#4) 6.8 QHS Vancomycin 500 mg IV once 1703 14.1 mcg/mL @ 5401    10/14  (#5) -- QHS No dose --     10/15  (#6) -- QHS Vancomycin 750 mg IV once 1843 10.1 mcg/mL @ 1655    10/16  (#7) 7.3 QHS No dose -- 24.6 mcg/mL @ 1400    10/17  (#8) -- QHS No dose -- 19.6 mcg/mL @ 9754    10/18  (#9) -- QHS Vancomycin 500 mg IV once 1747 17.2 mcg/mL @ 1430    10/19  (#10) 6.6 QHS Vancomycin 750 mg IV Q72H --     10/20  (#11) 10.4 QHS Vancomycin 750 mg IV Q72H --       Estimated Creatinine Clearance: 11 mL/min (A) (based on SCr of 7.7 mg/dL (H)). UOP over the past 24 hours:       Intake/Output Summary (Last 24 hours) at 10/20/2020 1644  Last data filed at 10/20/2020 1554  Gross per 24 hour   Intake 290 ml   Output 883 ml   Net -593 ml       Temp max: Temp (24hrs), Av.2 °F (36.8 °C), Min:97.5 °F (36.4 °C), Max:98.9 °F (37.2 °C)      Antibiotic Regimen: No other antibiotics at this time  Antibiotic Dose Date Initiated   Cefepime 500 mg x 1  1 g q24h 10/13  10/14     Cultures:  available culture and sensitivity results were reviewed in EPIC  Cultures sent and are pending.   Culture Date Result    Urine 10/8 E faecalis   Blood #1 10/8 Methicillin-resistant CoNS   Blood #2 10/8 Methicillin-resistant CoNS   Blood #3 10/12 NGTD   Blood #4 10/13 NGTD   MRSA Nares 10/13 Not isolated   C diff 10/13 Toxin A/B not detected   Peritoneal Body Fluid Cx 10/13 NGTD   COVID-19 10/20 Not detected     Assessment:  · Consulted by Dr. Karine Laureano to dose/monitor vancomycin  · Goal trough level:  15-20 mcg/mL  · Pt is a 31 yof admitted to the hospital for hyperglycemia. She is being initiated on vancomycin for a positive blood culture, final c/s pending.   · Wilton is ESRD on PD, cycles nightly, nephro following  · 10/11: Random level @ 1324 = 20.0 mcg/mL  · 10/12: Random level @ 1511 = 16.4 mcg/mL  · 10/13: Random level @ 1548 = 14.1 mcg/mL  · 10/15: Random level @ 1655 = 10.1 mcg/mL  · 10/16: Random level @ 1400 = 24.6 mcg/mL  · 10/17: Random level @ 1655 = 19.6 mcg/mL  · 10/18: Random level @ 1430 = 17.2 mcg/mL  · 10/20: YULISSA yesterday shows vegetation vs thrombus, planning for transfer to [facility] when bed available     Plan:  · Continue Vancomycin 750 mg IV Q72H  · Check levels PRN  · Follow nephro notes/PD schedule  · Pharmacist will follow and monitor/adjust dosing as necessary      Thank you for the consult,    Keren Ndiaye, PharmD, BCPS 10/20/2020 4:44 PM   [phone]

## 2020-10-20 NOTE — PROGRESS NOTES
PRN  heparin flush, 3 mL, PRN  sodium chloride flush, 10 mL, PRN  heparin flush, 1 mL, PRN  acetaminophen, 650 mg, Q4H PRN  cloNIDine, 0.1 mg, BID PRN  LORazepam, 0.5 mg, BID PRN  acetaminophen, 650 mg, Q6H PRN    Or  acetaminophen, 650 mg, Q6H PRN  magnesium hydroxide, 30 mL, Daily PRN  promethazine, 12.5 mg, Q6H PRN    Or  ondansetron, 4 mg, Q6H PRN  glucose, 15 g, PRN  dextrose, 12.5 g, PRN  glucagon (rDNA), 1 mg, PRN  dextrose, 100 mL/hr, PRN         Objective:    /70   Pulse 78   Temp 98.7 °F (37.1 °C) (Oral)   Resp 16   Ht 5' 4\" (1.626 m)   Wt 163 lb 5.8 oz (74.1 kg)   SpO2 96%   BMI 28.04 kg/m²   General Appearance: Somnolent but arousable and did start to cry when she was awakened  Skin: warm and dry  Head: normocephalic and atraumatic  Eyes: pupils equal, round, and reactive to light, extraocular eye movements intact, conjunctivae normal  Neck: neck supple and non tender without mass   Pulmonary/Chest: clear to auscultation bilaterally- no wheezes, rales or rhonchi, normal air movement, no respiratory distress  Cardiovascular: normal rate, normal S1 and S2 and no carotid bruits  Abdomen: soft, non-tender, non-distended, normal bowel sounds, no masses or organomegaly. Peritoneal dialysis catheter in place without surrounding erythema or drainage  Extremities: Left upper extremity in gutter splint.   Sensation and finger movement intact  Neurologic: no cranial nerve deficit and speech normal      Recent Labs     10/18/20  0620 10/18/20  1210 10/19/20  0639 10/20/20  0656     --  137 136   K 5.4* 5.1* 5.0 5.3*   CL 99  --  101 98   CO2 22  --  24 22   BUN 59*  --  59* 54*   CREATININE 7.5*  --  7.6* 7.7*   GLUCOSE 257*  --  115* 239*   CALCIUM 9.4  --  9.0 9.1       Recent Labs     10/18/20  0620 10/19/20  0639 10/20/20  0656   ALKPHOS 117* 125* 154*   PROT 6.2* 5.6* 6.2*   LABALBU 4.0 3.6 3.8   BILITOT 0.2 0.2 0.2   AST 62* 56* 77*   ALT 60* 68* 80*       Recent Labs     10/19/20  0639 WBC 6.6   RBC 2.91*   HGB 8.9*   HCT 29.9*   .7*   MCH 30.6   MCHC 29.8*   RDW 21.2*      MPV 9.9       CBC:   Lab Results   Component Value Date    WBC 6.6 10/19/2020    RBC 2.91 10/19/2020    HGB 8.9 10/19/2020    HCT 29.9 10/19/2020    .7 10/19/2020    MCH 30.6 10/19/2020    MCHC 29.8 10/19/2020    RDW 21.2 10/19/2020     10/19/2020    MPV 9.9 10/19/2020     BMP:    Lab Results   Component Value Date     10/20/2020    K 5.3 10/20/2020    K 3.7 10/11/2020    CL 98 10/20/2020    CO2 22 10/20/2020    BUN 54 10/20/2020    LABALBU 3.8 10/20/2020    LABALBU 4.1 05/18/2012    CREATININE 7.7 10/20/2020    CALCIUM 9.1 10/20/2020    GFRAA 8 10/20/2020    LABGLOM 8 10/20/2020    GLUCOSE 239 10/20/2020    GLUCOSE 130 05/18/2012        Radiology:   US DUP LOWER EXTREMITIES BILATERAL VENOUS   Final Result   No evidence of DVT in either lower extremity. XR CHEST PORTABLE   Final Result   1. No active pulmonary disease. CT HEAD WO CONTRAST   Final Result   1. No acute intracranial abnormality. XR HAND LEFT (MIN 3 VIEWS)   Final Result   Addendum 1 of 1   ADDENDUM:   Recommend clinical correlation for point tenderness involving the 5th   metacarpal neck. Final      XR KNEE LEFT (3 VIEWS)   Final Result   1. No acute abnormality involving the left knee. XR CHEST PORTABLE   Final Result   No evidence of pneumonia or pleural effusion.              Assessment/Plan:  Principal Problem:    Uncontrolled type 1 diabetes mellitus with hyperglycemia, with long-term current use of insulin (HCC)  Active Problems:    ESRD on peritoneal dialysis ([address]    Severe protein-calorie malnutrition ([facility] [address]    Acute cystitis without hematuria    Hypertension    Hypothyroidism    Hyperlipidemia    Hyperglycemia    Nondisplaced fracture of neck of fifth metacarpal bone, left hand, initial encounter for closed fracture    Acute encephalopathy    Effusion of left knee Hyperkalemia  Resolved Problems:    * No resolved hospital problems. *      1. Acute metabolic encephalopathy   -Resolved  -Likely due to prolonged hypoglycemia and hypotension    2. 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  -On vancomycin and cefepime per infectious disease  -YULISSA from 10/19 showed large vegetation 2 x 1 cm in the right atrium as well as 1 x1 cm vegetation. Made called through [place] and spoke to triage cardiologist at the [facility] who accepted patient. Bed availability to be determined.  -in review of patient's chart was able to see that mediport was placed April 2018 by IR. 3.  Uncontrolled type 1 diabetes mellitus   -Blood sugars remain extremely labile  -Ultimately she will need continuous glucose monitor and insulin pump. She is following with endocrinology as an outpatient    4. Hyperkalemia   -Continue scheduled peritoneal dialysis and as needed insulin for treatment of acute hyperkalemia    5. UTI   -Treated with fosfomycin on 10/9    6. Left fifth metacarpal fracture  -Splinted by orthopedic surgery  -Nonweightbearing per orthopedic surgery recommendation    7. Chronic low back pain   -Heating pad and Norco as needed    8. Left knee traumatic effusion   -wrapped per orthopedic surgery    9. ESRD on peritoneal dialysis  -Continue nightly peritoneal dialysis regimen per nephrology    10. Hypothyroidism  -Continue levothyroxine    11. Hyperlipidemia  -Continue atorvastatin    Disposition: to [facility] when bed available. Case discussed with Dr Jovita Pacheco of ID earlier today. NOTE: This report was transcribed using voice recognition software. Every effort was made to ensure accuracy; however, inadvertent computerized transcription errors may be present.      Electronically signed by Macy Walters MD on 10/20/2020 at 12:46 PM

## 2020-10-20 NOTE — PROGRESS NOTES
[facility]  [facility]  Progress Note    NAME:Wilton Flores  1990  DATE OF SERVICE:10/20/20    Pt was seen FACE TO FACE FOR atbx    SUBJECTIVE:  Chief Complaint   Patient presents with    Hyperglycemia     blood sugar high today vomiting back pain   asleep arousable   pt is aware of YULISSA result and plans for transfer  Patient is tolerating medications. No reported adverse drug reactions. Review of systems:  As stated above in the chief complaint, otherwise negative.     Medications:  Scheduled Meds:   [START ON 10/21/2020] vancomycin  750 mg Intravenous Q72H    insulin lispro  5 Units Subcutaneous TID     insulin glargine  12 Units Subcutaneous QAM    lactobacillus  1 capsule Oral Q12H    metoprolol  50 mg Oral BID    cefepime  1 g Intravenous Q24H    sodium chloride  1,000 mL Intravenous Once    sodium chloride  1,000 mL Intravenous Once    lidocaine  5 mL Intradermal Once    heparin flush  3 mL Intravenous 2 times per day    lidocaine  5 mL Intradermal Once    heparin flush  1 mL Intravenous 2 times per day    insulin lispro  0-6 Units Subcutaneous TID WC    insulin lispro  0-3 Units Subcutaneous Nightly    epoetin nena-epbx  8,000 Units Subcutaneous Once per day on Mon Wed Fri    [Held by provider] insulin NPH  5 Units Subcutaneous Nightly    sodium chloride flush  10 mL Intravenous 2 times per day    atorvastatin  40 mg Oral Nightly    dilTIAZem  240 mg Oral Daily    gabapentin  100 mg Oral TID    levothyroxine  50 mcg Oral Daily    metOLazone  5 mg Oral Daily    sodium chloride flush  10 mL Intravenous 2 times per day    heparin (porcine)  5,000 Units Subcutaneous 3 times per day     Continuous Infusions:   sodium chloride Stopped (10/16/20 0015)    dextrose Stopped (10/13/20 1304)     PRN Meds:oxyCODONE **OR** oxyCODONE, sodium chloride flush, heparin flush, sodium chloride flush, heparin flush, acetaminophen, cloNIDine, LORazepam, acetaminophen **OR** acetaminophen, magnesium hydroxide, promethazine **OR** ondansetron, glucose, dextrose, glucagon (rDNA), dextrose    OBJECTIVE:  /70   Pulse 78   Temp 98.7 °F (37.1 °C) (Oral)   Resp 16   Ht 5' 4\" (1.626 m)   Wt 163 lb 5.8 oz (74.1 kg)   SpO2 96%   BMI 28.04 kg/m²   Temp  Av.8 °F (36.6 °C)  Min: 97.2 °F (36.2 °C)  Max: 98.9 °F (37.2 °C)  Constitutional:  The patient is arousable  nauseated this am   Skin:    Warm and dry. HEENT:      AT/NC. Chest:   No use of accessory muscles to breathe. Symmetrical expansion. Cardiovascular:  S1 and S2 are rhythmic and regular. Abdomen:   Pd CATH distended  Extremities:    edema.  LEFT UE ACED  CNS    NAD  Lines: LEFT MEDIPORT     Radiology:  Laboratory and Tests Review:  Lab Results   Component Value Date    WBC 6.6 10/19/2020    WBC 7.3 10/16/2020    WBC 6.8 10/13/2020    HGB 8.9 (L) 10/19/2020    HCT 29.9 (L) 10/19/2020    .7 (H) 10/19/2020     10/19/2020     No results found for: [facility]  Lab Results   Component Value Date    ALT 80 (H) 10/20/2020    AST 77 (H) 10/20/2020    ALKPHOS 154 (H) 10/20/2020    BILITOT 0.2 10/20/2020     Lab Results   Component Value Date     10/20/2020    K 5.3 10/20/2020    K 3.7 10/11/2020    CL 98 10/20/2020    CO2 22 10/20/2020    BUN 54 10/20/2020    CREATININE 7.7 10/20/2020    CREATININE 7.6 10/19/2020    CREATININE 7.5 10/18/2020    GFRAA 8 10/20/2020    LABGLOM 8 10/20/2020    GLUCOSE 239 10/20/2020    GLUCOSE 130 2012    PROT 6.2 10/20/2020    LABALBU 3.8 10/20/2020    LABALBU 4.1 2012    CALCIUM 9.1 10/20/2020    BILITOT 0.2 10/20/2020    ALKPHOS 154 10/20/2020    AST 77 10/20/2020    ALT 80 10/20/2020     Lab Results   Component Value Date    CRP 43.1 (H) 2019    CRP 0.3 10/02/2019    CRP 0.4 2017     Lab Results   Component Value Date    SEDRATE 19 10/13/2020    SEDRATE 135 (H) 2019    SEDRATE 14 2017       Microbiology:   No results for input(s): COVID19 in the last 72 hours. Lab Results   Component Value Date    BLOODCULT2 5 Days no growth 07/18/2020    BLOODCULT2 5 Days- no growth 02/09/2020    BLOODCULT2 5 Days- no growth 02/08/2020    ORG Staphylococcus epidermidis 10/08/2020    ORG Staphylococcus epidermidis 10/08/2020    ORG Enterococcus faecalis 10/08/2020     WOUND/ABSCESS   Date Value Ref Range Status   12/11/2014 (A)  Final    Mixed yulisa also isolated, including:  Alpha hemolytic Strep species  Corynebacteria     12/11/2014 Heavy growth  Final     MRSA Culture Only   Date Value Ref Range Status   10/13/2020 Methicillin resistant Staph aureus not isolated  Final     ASSESSMENT:  Cons bacteremia has left MediPort   Rule out line infection +YULISSA    Summary   Large irregular, 2cm x 1cm, mobile, echogenic mass attached to the right   atrial side of interatrial septum near SVC opening suggestive of   vegetation or thrombus. Large, 1cm x 1cm, irregular echogenic mass attached to the tip of the   central venous catheter suggestive of vegetation or thrombus. Normal left ventricular systolic function. Interatrial septum intact. Agitated saline injection showed no right to left shunt. Normal right ventricle size and function. There is mild tricuspid regurgitation. Posterior mitral leaflet prolapse. There is moderate tricuspid regurgitation. Normal aortic root size. No evidence of pericardial effusion. Pericardium appears normal.   Technically sub-optimal images. Compared to prior TTE from 1/13/2020. E. faecalis bacteruria   CKD PD CATH has abd pain check cx  S/p fall -  traumatic nondisplaced fracture involving the 5th    metacarpal neck       Plan:   · Continue vancomycin- next dose scheduled for 10/21/2020  · Followed by pharmacy   · Continue  Cefepime     · For transfer to [facility]    Imaging and labs were reviewed per medical records.      The patient was educated about the diagnosis, prognosis, indications, risks and

## 2020-10-20 NOTE — PROGRESS NOTES
Department of Internal Medicine  Nephrology Progress Note    Events reviewed. For YULISSA today. SUBJECTIVE:  We are following [address] [place] [address] for ESRD on peritoneal dialysis.   Reports abdominal pain    Echo results noted, she has a right atrial vegetation  Plans for transfer to [facility] noted    Physical Exam:    VITALS:  /70   Pulse 78   Temp 98.7 °F (37.1 °C) (Oral)   Resp 16   Ht 5' 4\" (1.626 m)   Wt 163 lb 5.8 oz (74.1 kg)   SpO2 96%   BMI 28.04 kg/m²   24HR INTAKE/OUTPUT:      Intake/Output Summary (Last 24 hours) at 10/20/2020 1319  Last data filed at 10/20/2020 0730  Gross per 24 hour   Intake 370 ml   Output 883 ml   Net -513 ml     Constitutional:  Alert and oriented, in no distress  HEENT:  Normocephalic, PERRL  Respiratory:  CTA bilaterally  Cardiovascular/Edema:  RRR, S1/S2  Gastrointestinal:  Soft, PD catheter exit site clean   Neurologic:  Nonfocal, AVELAR  Skin:  Warm, dry, no lesions  Other:  No edema    Scheduled Meds:   LORazepam  0.5 mg Intravenous Once    [START ON 10/21/2020] vancomycin  750 mg Intravenous Q72H    insulin lispro  5 Units Subcutaneous TID     insulin glargine  12 Units Subcutaneous QAM    lactobacillus  1 capsule Oral Q12H    metoprolol  50 mg Oral BID    cefepime  1 g Intravenous Q24H    sodium chloride  1,000 mL Intravenous Once    sodium chloride  1,000 mL Intravenous Once    lidocaine  5 mL Intradermal Once    heparin flush  3 mL Intravenous 2 times per day    lidocaine  5 mL Intradermal Once    heparin flush  1 mL Intravenous 2 times per day    insulin lispro  0-6 Units Subcutaneous TID     insulin lispro  0-3 Units Subcutaneous Nightly    epoetin nena-epbx  8,000 Units Subcutaneous Once per day on Mon Wed Fri    [Held by provider] insulin NPH  5 Units Subcutaneous Nightly    sodium chloride flush  10 mL Intravenous 2 times per day    atorvastatin  40 mg Oral Nightly    dilTIAZem  240 mg Oral Daily    gabapentin  100 mg Oral TID   [facility] levothyroxine  50 mcg Oral Daily    metOLazone  5 mg Oral Daily    sodium chloride flush  10 mL Intravenous 2 times per day    heparin (porcine)  5,000 Units Subcutaneous 3 times per day     Continuous Infusions:   sodium chloride Stopped (10/16/20 0015)    dextrose Stopped (10/13/20 1304)     PRN Meds:.oxyCODONE **OR** oxyCODONE, sodium chloride flush, heparin flush, sodium chloride flush, heparin flush, acetaminophen, cloNIDine, LORazepam, acetaminophen **OR** acetaminophen, magnesium hydroxide, promethazine **OR** ondansetron, glucose, dextrose, glucagon (rDNA), dextrose      DATA:    CBC:   Lab Results   Component Value Date    WBC 6.6 10/19/2020    RBC 2.91 10/19/2020    HGB 8.9 10/19/2020    HCT 29.9 10/19/2020    .7 10/19/2020    MCH 30.6 10/19/2020    MCHC 29.8 10/19/2020    RDW 21.2 10/19/2020     10/19/2020    MPV 9.9 10/19/2020     CMP:    Lab Results   Component Value Date     10/20/2020    K 5.3 10/20/2020    K 3.7 10/11/2020    CL 98 10/20/2020    CO2 22 10/20/2020    BUN 54 10/20/2020    CREATININE 7.7 10/20/2020    GFRAA 8 10/20/2020    LABGLOM 8 10/20/2020    GLUCOSE 239 10/20/2020    GLUCOSE 130 05/18/2012    PROT 6.2 10/20/2020    LABALBU 3.8 10/20/2020    LABALBU 4.1 05/18/2012    CALCIUM 9.1 10/20/2020    BILITOT 0.2 10/20/2020    ALKPHOS 154 10/20/2020    AST 77 10/20/2020    ALT 80 10/20/2020     Magnesium:    Lab Results   Component Value Date    MG 2.3 10/14/2020     Phosphorus:    Lab Results   Component Value Date    PHOS 8.9 10/14/2020     Radiology Review:      Chest x-ray October 8, 2020   No evidence of pneumonia or pleural effusion.               BRIEF SUMMARY OF INITIAL CONSULT:    Briefly, Pieter Montanez is a 31year-old female with history of ESRD on peritoneal dialysis (CCPD), poorly controlled type I DM with multiple admissions for DKA, gastroparesis, HTN  UTI, who was admitted October 8, 2020 after she presented to the ER reporting feeling unwell, having lower back pain and vomiting. In the ER her glucose was 570 mg/dL. Her urine showed >20 WBCs. IMPRESSION/RECOMMENDATIONS:      1. ESRD on peritoneal dialysis/CCPD. To continue same prescription. 2. Hyperkalemia, multifactorial, 2/2 hyperglycemia, ESRD and K supplementation (orange juice). · Potassium level improved today. 3. Coagulase-negative Staphylococcus bacteremia, probably source MediPort, on vancomycin and cefepime per ID; for YULISSA 10/19  4. E faecalis bacteriuria  5. HTN, on metoprolol and diltiazem  6. Type I DM, glucose levels improved  7. Left fifth metacarpal fracture, status post fall  8. Left knee effusion, traumatic  9.  Anemia of CKD, on epoetin alpha 8,000 units tiw    Plan:    · Continue low potassium diet; no orange juice for hypoglycemia  · Continue CCPD  · Discussed with the dialysis nurse, send PD fluid for cell count with diff, gram stain , culture and sensitivity to r/o PD associated peritonitis  · Continue Bumex 2 mg twice daily  · Continue metolazone 5 mg daily  · Continue diltiazem 240 mg daily  · Continue lisinopril 20 mg daily  · Continue metoprolol 100 mg twice a day  · Plans for transfer to [facility] noted       Electronically signed by Chi Quiroga MD on 10/20/2020 at 1:19 PM

## 2020-10-20 NOTE — SIGNIFICANT EVENT
Called by nursing to come and reevaluate patient was having difficulty being aroused. They were concerned she may be having another seizure but no tonic-clonic activity was noted. Patient was somnolent but was able to be aroused. Blood sugar was 101 and was given half an amp of D50. Of note, as patient with decreased LOC not entirely explained by relative hypoglycemia and that fact she was just started on heparin, decided to get stat CT of head to evaluate for any acute abnormality. Patient did respond to noxious stimuli and was able to open eyes where pupils examined and found to be symmetric.  She was non-focal on exam.

## 2020-10-20 NOTE — FLOWSHEET NOTE
10/19/20 2025   Vitals   Weight 161 lb 2.5 oz (73.1 kg)   Peritoneal Dialysis Catheter Tenckhoff    Placement Date: 06/26/20   Catheter Type: Tenckhoff  Catheter Location: (c)    Status Accessed   Site Condition   (dry skin no drainage)   Dressing Status Clean;Dry; Intact   Dressing Occlusive  (chlorohexidine infused gel dressing)   Cycler   Verification of Prescription CCPD   Total Volume Programmed 94389 mL   Therapy Time (Hours:Minutes) 0900   Fill Volume 2000 mL   Last Fill Volume 2000 mL   Dextrose Setting Different (Extraneal)  (1.5% cycler exchanges with 2.5% last fill option )   Number of Cycles 5   Bag Volume 5000 mL   Number of Bags Used 3   Dianeal Solution Other (Comment)  (1.5% and 2.5% for last bag last fill option)       Patient somnolent post procedure. . Patients cefepime currently finishing infusion.       Call placed to pharmacy requesting clarification of infusion time    Treatment initiated with no adventitious events  Dressing changed   Effluent clear yellow

## 2020-10-20 NOTE — FLOWSHEET NOTE
10/20/20 0730   Vitals   /70   Temp 98.9 °F (37.2 °C)   Temp Source Oral   Pulse 78   Resp 16   Weight 163 lb 5.8 oz (74.1 kg)   Peritoneal Dialysis Catheter Tenckhoff    Placement Date: 06/26/20   Catheter Type: Tenckhoff  Catheter Location: (c)    Status Deaccessed   Dressing Status Clean;Dry; Intact   Dressing Gauze   Cycler   Verification of Prescription CCPD   Total Volume Programmed 91183 mL   Therapy Time (Hours:Minutes) 0900   Fill Volume 2000 mL   Last Fill Volume 2000 mL   Dextrose Setting Different (Extraneal)   Number of Cycles 5   Bag Volume 5000 mL   Number of Bags Used 3   Dianeal Solution Other (Comment)  (1.5% 5000, 2.5% 5000)   Ultrafiltration (UF) (mL) 883 mL   CCPD completed, pt de-accessed using strict aseptic technique, effluent is clear, pale, yellow with no fibrin present

## 2020-10-21 LAB
ALBUMIN SERPL-MCNC: 3.5 G/DL (ref 3.5–5.2)
ALBUMIN SERPL-MCNC: 3.6 G/DL (ref 3.5–5.2)
ALP BLD-CCNC: 157 U/L (ref 35–104)
ALP BLD-CCNC: 177 U/L (ref 35–104)
ALT SERPL-CCNC: 58 U/L (ref 0–32)
ALT SERPL-CCNC: 65 U/L (ref 0–32)
AMMONIA: 52 UMOL/L (ref 11–51)
ANION GAP SERPL CALCULATED.3IONS-SCNC: 17 MMOL/L (ref 7–16)
ANION GAP SERPL CALCULATED.3IONS-SCNC: 21 MMOL/L (ref 7–16)
ANION GAP SERPL CALCULATED.3IONS-SCNC: 22 MMOL/L (ref 7–16)
APTT: 21.6 SEC (ref 24.5–35.1)
APTT: >240 SEC (ref 24.5–35.1)
APTT: >240 SEC (ref 24.5–35.1)
AST SERPL-CCNC: 38 U/L (ref 0–31)
AST SERPL-CCNC: 38 U/L (ref 0–31)
B.E.: -7.8 MMOL/L (ref -3–3)
BILIRUB SERPL-MCNC: 0.3 MG/DL (ref 0–1.2)
BILIRUB SERPL-MCNC: 0.3 MG/DL (ref 0–1.2)
BUN BLDV-MCNC: 65 MG/DL (ref 6–20)
BUN BLDV-MCNC: 68 MG/DL (ref 6–20)
BUN BLDV-MCNC: 73 MG/DL (ref 6–20)
CALCIUM SERPL-MCNC: 8.8 MG/DL (ref 8.6–10.2)
CALCIUM SERPL-MCNC: 8.8 MG/DL (ref 8.6–10.2)
CALCIUM SERPL-MCNC: 9.1 MG/DL (ref 8.6–10.2)
CHLORIDE BLD-SCNC: 95 MMOL/L (ref 98–107)
CHLORIDE BLD-SCNC: 96 MMOL/L (ref 98–107)
CHLORIDE BLD-SCNC: 97 MMOL/L (ref 98–107)
CO2: 17 MMOL/L (ref 22–29)
CO2: 18 MMOL/L (ref 22–29)
CO2: 23 MMOL/L (ref 22–29)
COHB: 0.6 % (ref 0–1.5)
CREAT SERPL-MCNC: 8.7 MG/DL (ref 0.5–1)
CREAT SERPL-MCNC: 8.8 MG/DL (ref 0.5–1)
CREAT SERPL-MCNC: 9.1 MG/DL (ref 0.5–1)
CRITICAL: ABNORMAL
DATE ANALYZED: ABNORMAL
DATE OF COLLECTION: ABNORMAL
GFR AFRICAN AMERICAN: 6
GFR AFRICAN AMERICAN: 6
GFR AFRICAN AMERICAN: 7
GFR NON-AFRICAN AMERICAN: 6 ML/MIN/1.73
GFR NON-AFRICAN AMERICAN: 6 ML/MIN/1.73
GFR NON-AFRICAN AMERICAN: 7 ML/MIN/1.73
GLUCOSE BLD-MCNC: 160 MG/DL (ref 74–99)
GLUCOSE BLD-MCNC: 168 MG/DL (ref 74–99)
GLUCOSE BLD-MCNC: 401 MG/DL (ref 74–99)
GRAM STAIN ORDERABLE: NORMAL
HCO3: 18.4 MMOL/L (ref 22–26)
HHB: 3.3 % (ref 0–5)
LAB: ABNORMAL
Lab: ABNORMAL
METER GLUCOSE: 156 MG/DL (ref 74–99)
METER GLUCOSE: 199 MG/DL (ref 74–99)
METER GLUCOSE: 394 MG/DL (ref 74–99)
METER GLUCOSE: 66 MG/DL (ref 74–99)
METER GLUCOSE: 87 MG/DL (ref 74–99)
METER GLUCOSE: 95 MG/DL (ref 74–99)
METER GLUCOSE: <40 MG/DL (ref 74–99)
METER GLUCOSE: <40 MG/DL (ref 74–99)
METHB: 0.4 % (ref 0–1.5)
O2 CONTENT: 14 ML/DL
O2 SATURATION: 96.7 % (ref 92–98.5)
O2HB: 95.7 % (ref 94–97)
OPERATOR ID: 274
PATIENT TEMP: 37
PCO2: 40.1 MMHG (ref 35–45)
PH BLOOD GAS: 7.28 (ref 7.35–7.45)
PO2: 102.3 MMHG (ref 75–100)
POTASSIUM SERPL-SCNC: 5.3 MMOL/L (ref 3.5–5)
POTASSIUM SERPL-SCNC: 5.7 MMOL/L (ref 3.5–5)
POTASSIUM SERPL-SCNC: 5.9 MMOL/L (ref 3.5–5)
SODIUM BLD-SCNC: 134 MMOL/L (ref 132–146)
SODIUM BLD-SCNC: 136 MMOL/L (ref 132–146)
SODIUM BLD-SCNC: 136 MMOL/L (ref 132–146)
SOURCE, BLOOD GAS: ABNORMAL
THB: 10.3 G/DL (ref 11.5–16.5)
TIME ANALYZED: 1239
TOTAL PROTEIN: 5.6 G/DL (ref 6.4–8.3)
TOTAL PROTEIN: 6 G/DL (ref 6.4–8.3)
VANCOMYCIN RANDOM: 17.5 MCG/ML (ref 5–40)

## 2020-10-21 PROCEDURE — 2580000003 HC RX 258: Performed by: INTERNAL MEDICINE

## 2020-10-21 PROCEDURE — 80053 COMPREHEN METABOLIC PANEL: CPT

## 2020-10-21 PROCEDURE — 82805 BLOOD GASES W/O2 SATURATION: CPT

## 2020-10-21 PROCEDURE — 1200000000 HC SEMI PRIVATE

## 2020-10-21 PROCEDURE — 87040 BLOOD CULTURE FOR BACTERIA: CPT

## 2020-10-21 PROCEDURE — 82140 ASSAY OF AMMONIA: CPT

## 2020-10-21 PROCEDURE — 6360000002 HC RX W HCPCS: Performed by: NURSE PRACTITIONER

## 2020-10-21 PROCEDURE — 80202 ASSAY OF VANCOMYCIN: CPT

## 2020-10-21 PROCEDURE — 6370000000 HC RX 637 (ALT 250 FOR IP): Performed by: SPECIALIST

## 2020-10-21 PROCEDURE — 6370000000 HC RX 637 (ALT 250 FOR IP): Performed by: INTERNAL MEDICINE

## 2020-10-21 PROCEDURE — 2500000003 HC RX 250 WO HCPCS: Performed by: NURSE PRACTITIONER

## 2020-10-21 PROCEDURE — 6360000002 HC RX W HCPCS: Performed by: INTERNAL MEDICINE

## 2020-10-21 PROCEDURE — 2700000000 HC OXYGEN THERAPY PER DAY

## 2020-10-21 PROCEDURE — 80048 BASIC METABOLIC PNL TOTAL CA: CPT

## 2020-10-21 PROCEDURE — 90945 DIALYSIS ONE EVALUATION: CPT

## 2020-10-21 PROCEDURE — 99233 SBSQ HOSP IP/OBS HIGH 50: CPT | Performed by: INTERNAL MEDICINE

## 2020-10-21 PROCEDURE — 82962 GLUCOSE BLOOD TEST: CPT

## 2020-10-21 PROCEDURE — 85730 THROMBOPLASTIN TIME PARTIAL: CPT

## 2020-10-21 PROCEDURE — 36415 COLL VENOUS BLD VENIPUNCTURE: CPT

## 2020-10-21 PROCEDURE — 36600 WITHDRAWAL OF ARTERIAL BLOOD: CPT

## 2020-10-21 RX ORDER — CALCIUM GLUCONATE 94 MG/ML
1 INJECTION, SOLUTION INTRAVENOUS ONCE
Status: DISCONTINUED | OUTPATIENT
Start: 2020-10-21 | End: 2020-10-21

## 2020-10-21 RX ADMIN — DILTIAZEM HYDROCHLORIDE 240 MG: 240 CAPSULE, COATED, EXTENDED RELEASE ORAL at 09:24

## 2020-10-21 RX ADMIN — Medication 1 CAPSULE: at 09:24

## 2020-10-21 RX ADMIN — METOPROLOL TARTRATE 50 MG: 50 TABLET, FILM COATED ORAL at 09:24

## 2020-10-21 RX ADMIN — SODIUM BICARBONATE 50 MEQ: 84 INJECTION, SOLUTION INTRAVENOUS at 13:18

## 2020-10-21 RX ADMIN — EPOETIN ALFA-EPBX 8000 UNITS: 4000 INJECTION, SOLUTION INTRAVENOUS; SUBCUTANEOUS at 16:58

## 2020-10-21 RX ADMIN — HEPARIN SODIUM AND DEXTROSE 18.98 UNITS/KG/HR: 10000; 5 INJECTION INTRAVENOUS at 20:09

## 2020-10-21 RX ADMIN — DEXTROSE MONOHYDRATE 12.5 G: 25 INJECTION, SOLUTION INTRAVENOUS at 18:56

## 2020-10-21 RX ADMIN — DEXTROSE MONOHYDRATE 100 ML/HR: 50 INJECTION, SOLUTION INTRAVENOUS at 19:01

## 2020-10-21 RX ADMIN — INSULIN GLARGINE 12 UNITS: 100 INJECTION, SOLUTION SUBCUTANEOUS at 09:16

## 2020-10-21 RX ADMIN — INSULIN LISPRO 5 UNITS: 100 INJECTION, SOLUTION INTRAVENOUS; SUBCUTANEOUS at 13:57

## 2020-10-21 RX ADMIN — DEXTROSE MONOHYDRATE 12.5 G: 25 INJECTION, SOLUTION INTRAVENOUS at 16:54

## 2020-10-21 RX ADMIN — HEPARIN SODIUM AND DEXTROSE 15 UNITS/KG/HR: 10000; 5 INJECTION INTRAVENOUS at 03:37

## 2020-10-21 RX ADMIN — INSULIN LISPRO 5 UNITS: 100 INJECTION, SOLUTION INTRAVENOUS; SUBCUTANEOUS at 09:16

## 2020-10-21 RX ADMIN — VANCOMYCIN HYDROCHLORIDE 750 MG: 5 INJECTION, POWDER, LYOPHILIZED, FOR SOLUTION INTRAVENOUS at 18:35

## 2020-10-21 RX ADMIN — HEPARIN SODIUM 5930 UNITS: 1000 INJECTION, SOLUTION INTRAVENOUS; SUBCUTANEOUS at 20:05

## 2020-10-21 RX ADMIN — DEXTROSE MONOHYDRATE 12.5 G: 25 INJECTION, SOLUTION INTRAVENOUS at 17:09

## 2020-10-21 RX ADMIN — METOLAZONE 5 MG: 2.5 TABLET ORAL at 09:24

## 2020-10-21 RX ADMIN — Medication 10 ML: at 21:00

## 2020-10-21 RX ADMIN — CALCIUM GLUCONATE 1 G: 98 INJECTION, SOLUTION INTRAVENOUS at 13:57

## 2020-10-21 NOTE — PROGRESS NOTES
Department of Internal Medicine  Nephrology Progress Note    Events reviewed. Vegetation per YULISSA, plan for transfer to [facility]. SUBJECTIVE:  We are following [address] [place] [address] for ESRD on peritoneal dialysis. She is very lethargic today, unable to follow commands.        Physical Exam:    VITALS:  /60   Pulse 80   Temp 98.3 °F (36.8 °C) (Oral)   Resp 16   Ht 5' 4\" (1.626 m)   Wt 163 lb 5.8 oz (74.1 kg)   SpO2 100%   BMI 28.04 kg/m²   24HR INTAKE/OUTPUT:      Intake/Output Summary (Last 24 hours) at 10/21/2020 1204  Last data filed at 10/20/2020 1554  Gross per 24 hour   Intake 120 ml   Output --   Net 120 ml     Constitutional: Lethargic  HEENT:  Normocephalic, PERRL  Respiratory:  CTA bilaterally  Cardiovascular/Edema:  RRR, S1/S2  Gastrointestinal:  Soft, PD catheter exit site clean   Neurologic:  Lethargic, rigors  Skin:  Warm, dry, no lesions  Other:  No edema; L arm cast    Scheduled Meds:   vancomycin  750 mg Intravenous Q72H    insulin lispro  5 Units Subcutaneous TID     insulin glargine  12 Units Subcutaneous QAM    lactobacillus  1 capsule Oral Q12H    metoprolol  50 mg Oral BID    sodium chloride  1,000 mL Intravenous Once    sodium chloride  1,000 mL Intravenous Once    lidocaine  5 mL Intradermal Once    heparin flush  3 mL Intravenous 2 times per day    lidocaine  5 mL Intradermal Once    heparin flush  1 mL Intravenous 2 times per day    insulin lispro  0-6 Units Subcutaneous TID WC    insulin lispro  0-3 Units Subcutaneous Nightly    epoetin nena-epbx  8,000 Units Subcutaneous Once per day on Mon Wed Fri    [Held by provider] insulin NPH  5 Units Subcutaneous Nightly    sodium chloride flush  10 mL Intravenous 2 times per day    atorvastatin  40 mg Oral Nightly    dilTIAZem  240 mg Oral Daily    gabapentin  100 mg Oral TID    levothyroxine  50 mcg Oral Daily    metOLazone  5 mg Oral Daily    sodium chloride flush  10 mL Intravenous 2 times per day     Continuous peritoneal dialysis/CCPD. · PD was held last evening. She is hyperkalemic and acidotic today. To resume PD with one exchange today and cycler tonight. 2. Hyperkalemia, multifactorial, 2/2 hyperglycemia, ESRD and K supplementation (orange juice). · K 5.9 today. She is a brittle diabetic so we will hold off on insulin and D50- to give calcium gluconate and sodium bicarbonate now and resume PD today. 3. Acidemia, pH 5.941, with metabolic acidosis (HAGMA 2/2 uremia, ? Lactic acidosis)  4. Coagulase-negative Staphylococcus bacteremia, probably source MediPort, on vancomycin and s/p cefepime (discontinued today due to neuro changes) per ID; YULISSA + vegetation/thrombus    5. 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 Mediport will need to be removed. 6. Acute encephalopathy, metabolic 2/2 acidemia, ? Cefepime related  7. E faecalis bacteriuria  8. HTN, on metoprolol and diltiazem  9. Type I DM, poorly controlled  10. Left fifth metacarpal fracture, status post fall  11. Left knee effusion, traumatic  12.  Anemia of CKD, on epoetin alpha 8,000 units tiw    Plan:    · Repeat BMP stat now  · Give 1 amp of sodium bicarbonate 50 mEq IV x 1  · Calcium gluconate 1 g IV x 1  · CCPD held last night, to resume today with one exchange today and cycler tonight  · Continue metolazone 5 mg daily  · Continue diltiazem 240 mg daily  · Continue metoprolol 50 mg twice a day  · Continue low potassium diet, no orange juice for hypoglycemia  · Plans for transfer to [facility] noted       Electronically signed by HEBER Barton CNP on 10/21/2020 at 12:04 PM

## 2020-10-21 NOTE — PROGRESS NOTES
[facility]  [facility]  Progress Note    NAME:Wilton Flores  1990  DATE OF SERVICE:10/21/20    Pt was seen FACE TO FACE FOR atbx    SUBJECTIVE:  Chief Complaint   Patient presents with    Hyperglycemia     blood sugar high today vomiting back pain   patient lethargic- seems to have tremors in bed- shaking- hard to arouse. She had a RRT called last night   Patient is tolerating medications. No reported adverse drug reactions. Review of systems:  As stated above in the chief complaint, otherwise negative.     Medications:  Scheduled Meds:   sodium bicarbonate  50 mEq Intravenous Once    vancomycin  750 mg Intravenous Q72H    insulin lispro  5 Units Subcutaneous TID WC    insulin glargine  12 Units Subcutaneous QAM    lactobacillus  1 capsule Oral Q12H    metoprolol  50 mg Oral BID    sodium chloride  1,000 mL Intravenous Once    sodium chloride  1,000 mL Intravenous Once    lidocaine  5 mL Intradermal Once    heparin flush  3 mL Intravenous 2 times per day    lidocaine  5 mL Intradermal Once    heparin flush  1 mL Intravenous 2 times per day    insulin lispro  0-6 Units Subcutaneous TID WC    insulin lispro  0-3 Units Subcutaneous Nightly    epoetin nena-epbx  8,000 Units Subcutaneous Once per day on Mon Wed Fri    [Held by provider] insulin NPH  5 Units Subcutaneous Nightly    sodium chloride flush  10 mL Intravenous 2 times per day    atorvastatin  40 mg Oral Nightly    dilTIAZem  240 mg Oral Daily    gabapentin  100 mg Oral TID    levothyroxine  50 mcg Oral Daily    metOLazone  5 mg Oral Daily    sodium chloride flush  10 mL Intravenous 2 times per day     Continuous Infusions:   heparin (PORCINE) Infusion 15 Units/kg/hr (10/21/20 0457)    dextrose Stopped (10/13/20 1304)     PRN Meds:heparin (porcine), heparin (porcine), oxyCODONE **OR** oxyCODONE, sodium chloride flush, heparin flush, sodium chloride flush, heparin flush, acetaminophen, cloNIDine, LORazepam, acetaminophen **OR** acetaminophen, magnesium hydroxide, promethazine **OR** ondansetron, glucose, dextrose, glucagon (rDNA), dextrose    OBJECTIVE:  /60   Pulse 80   Temp 98.3 °F (36.8 °C) (Oral)   Resp 16   Ht 5' 4\" (1.626 m)   Wt 163 lb 5.8 oz (74.1 kg)   SpO2 100%   BMI 28.04 kg/m²   Temp  Av.1 °F (37.3 °C)  Min: 98.3 °F (36.8 °C)  Max: 100.2 °F (37.9 °C)  Constitutional:  The patient is lethargic   Skin:    Warm and dry. HEENT:      AT/NC. Chest:   No use of accessory muscles to breathe. Symmetrical expansion. Cardiovascular:  S1 and S2 are rhythmic and regular. Abdomen:   Pd CATH distended  Extremities:    edema.  LEFT UE ACED  CNS    NAD  Lines: LEFT MEDIPORT   Right arm with line- no phlebitis- 10/13/2020    Radiology:  Laboratory and Tests Review:  Lab Results   Component Value Date    WBC 10.4 10/20/2020    WBC 6.6 10/19/2020    WBC 7.3 10/16/2020    HGB 9.3 (L) 10/20/2020    HCT 30.9 (L) 10/20/2020    .4 (H) 10/20/2020     10/20/2020     No results found for: [facility]  Lab Results   Component Value Date    ALT 65 (H) 10/21/2020    AST 38 (H) 10/21/2020    ALKPHOS 177 (H) 10/21/2020    BILITOT 0.3 10/21/2020     Lab Results   Component Value Date     10/21/2020    K 5.9 10/21/2020    K 3.7 10/11/2020    CL 95 10/21/2020    CO2 17 10/21/2020    BUN 65 10/21/2020    CREATININE 8.7 10/21/2020    CREATININE 7.7 10/20/2020    CREATININE 7.6 10/19/2020    GFRAA 7 10/21/2020    LABGLOM 7 10/21/2020    GLUCOSE 401 10/21/2020    GLUCOSE 130 2012    PROT 5.6 10/21/2020    LABALBU 3.5 10/21/2020    LABALBU 4.1 2012    CALCIUM 8.8 10/21/2020    BILITOT 0.3 10/21/2020    ALKPHOS 177 10/21/2020    AST 38 10/21/2020    ALT 65 10/21/2020     Lab Results   Component Value Date    CRP 43.1 (H) 2019    CRP 0.3 10/02/2019    CRP 0.4 2017     Lab Results   Component Value Date    SEDRATE 19 10/13/2020    SEDRATE 135 (H) 2019 SEDRATE 14 09/28/2017       Microbiology:   Recent Labs     10/20/20  1415   COVID19 Not Detected     Lab Results   Component Value Date    BLOODCULT2 5 Days no growth 07/18/2020    BLOODCULT2 5 Days- no growth 02/09/2020    BLOODCULT2 5 Days- no growth 02/08/2020    ORG Staphylococcus epidermidis 10/08/2020    ORG Staphylococcus epidermidis 10/08/2020    ORG Enterococcus faecalis 10/08/2020     WOUND/ABSCESS   Date Value Ref Range Status   12/11/2014 (A)  Final    Mixed yulisa also isolated, including:  Alpha hemolytic Strep species  Corynebacteria     12/11/2014 Heavy growth  Final     MRSA Culture Only   Date Value Ref Range Status   10/13/2020 Methicillin resistant Staph aureus not isolated  Final     ASSESSMENT:  Cons bacteremia has left MediPort   Rule out line infection +YULISSA    Summary   Large irregular, 2cm x 1cm, mobile, echogenic mass attached to the right   atrial side of interatrial septum near SVC opening suggestive of   vegetation or thrombus. Large, 1cm x 1cm, irregular echogenic mass attached to the tip of the   central venous catheter suggestive of vegetation or thrombus. Normal left ventricular systolic function. Interatrial septum intact. Agitated saline injection showed no right to left shunt. Normal right ventricle size and function. There is mild tricuspid regurgitation. Posterior mitral leaflet prolapse. There is moderate tricuspid regurgitation. Normal aortic root size. No evidence of pericardial effusion. Pericardium appears normal.   Technically sub-optimal images. Compared to prior TTE from 1/13/2020. E. faecalis bacteruria   CKD PD CATH   S/p fall -  traumatic nondisplaced fracture involving the 5th    metacarpal neck     Lethargy- decreased mentation- ?  Cefepime     Plan:   · Continue vancomycin- next dose scheduled for 10/21/2020  · Followed by pharmacy   · Stop cefepime  · Check ammonia level, check abgs, and blood cultures today   · For transfer to [facility]-

## 2020-10-21 NOTE — PROGRESS NOTES
[address] [place], [place] [address]ist   Progress Note    Admitting Date and Time: 10/8/2020  3:15 PM  Admit Dx: Type I diabetes mellitus with hyperosmolar coma ([facility] [address] [E10.69, E10.65]    Subjective/interval history:    10/18: Patient's left hand pain has improved. She does complain of some nausea. Another episode of hypoglycemia this morning. Scheduled to have YULISSA tomorrow. 10/19: Patient NPO for YULISSA today and states she is hungry as was unable to eat yesterday because of N/V    10/20: Patient seen earlier this morning and was made aware of the positive YULISSA results and the need for transfer to the [facility] clinic. She was agreeable patient had state that she  was not feeling well this morning. 10/21: Patient more obtunded today. Nephrology nurse practitioner at bedside concerned about her hyperkalemia. She has been ordered an amp of bicarbonate amp of D50 in order to do an exchange for her peritoneal dialysis right now. She did not get her evening peritoneal dialysis per nephrology over concerns events that had transpired yesterday.   ID nurse practitioner was by earlier and discontinued cefepime thinking that may be contributing to her altered mental state       calcium gluconate IVPB  1 g Intravenous Once    vancomycin  750 mg Intravenous Q72H    insulin lispro  5 Units Subcutaneous TID     insulin glargine  12 Units Subcutaneous QAM    lactobacillus  1 capsule Oral Q12H    metoprolol  50 mg Oral BID    sodium chloride  1,000 mL Intravenous Once    sodium chloride  1,000 mL Intravenous Once    lidocaine  5 mL Intradermal Once    heparin flush  3 mL Intravenous 2 times per day    lidocaine  5 mL Intradermal Once    heparin flush  1 mL Intravenous 2 times per day    insulin lispro  0-6 Units Subcutaneous TID     insulin lispro  0-3 Units Subcutaneous Nightly    epoetin nena-epbx  8,000 Units Subcutaneous Once per day on Mon Wed Fri    [Held by provider] insulin NPH  5 Units Subcutaneous Nightly    sodium chloride flush  10 mL Intravenous 2 times per day    atorvastatin  40 mg Oral Nightly    dilTIAZem  240 mg Oral Daily    gabapentin  100 mg Oral TID    levothyroxine  50 mcg Oral Daily    metOLazone  5 mg Oral Daily    sodium chloride flush  10 mL Intravenous 2 times per day     heparin (porcine), 80 Units/kg, PRN  heparin (porcine), 40 Units/kg, PRN  oxyCODONE, 5 mg, Q4H PRN    Or  oxyCODONE, 10 mg, Q4H PRN  sodium chloride flush, 10 mL, PRN  heparin flush, 3 mL, PRN  sodium chloride flush, 10 mL, PRN  heparin flush, 1 mL, PRN  acetaminophen, 650 mg, Q4H PRN  cloNIDine, 0.1 mg, BID PRN  LORazepam, 0.5 mg, BID PRN  acetaminophen, 650 mg, Q6H PRN    Or  acetaminophen, 650 mg, Q6H PRN  magnesium hydroxide, 30 mL, Daily PRN  promethazine, 12.5 mg, Q6H PRN    Or  ondansetron, 4 mg, Q6H PRN  glucose, 15 g, PRN  dextrose, 12.5 g, PRN  glucagon (rDNA), 1 mg, PRN  dextrose, 100 mL/hr, PRN         Objective:    /60   Pulse 80   Temp 98.3 °F (36.8 °C) (Oral)   Resp 16   Ht 5' 4\" (1.626 m)   Wt 163 lb 5.8 oz (74.1 kg)   SpO2 100%   BMI 28.04 kg/m²   General Appearance: more somnolent than yesterday and hard to arouse. Myoclonic jerking evident. Skin: warm and dry  Head: normocephalic and atraumatic  Eyes: pupils equal, round, and reactive to light, extraocular eye movements intact, conjunctivae normal  Neck: neck supple and non tender without mass   Pulmonary/Chest: clear to auscultation bilaterally- no wheezes, rales or rhonchi, normal air movement, no respiratory distress  Cardiovascular: normal rate, normal S1 and S2 and no carotid bruits  Abdomen: soft, non-tender, non-distended, normal bowel sounds, no masses or organomegaly. Peritoneal dialysis catheter in place without surrounding erythema or drainage  Extremities: Left upper extremity in gutter splint.   Sensation and finger movement intact  Neurologic: no cranial nerve deficit and speech normal      Recent Labs 10/19/20  0639 10/20/20  0656 10/21/20  0602    136 134   K 5.0 5.3* 5.9*    98 95*   CO2 24 22 17*   BUN 59* 54* 65*   CREATININE 7.6* 7.7* 8.7*   GLUCOSE 115* 239* 401*   CALCIUM 9.0 9.1 8.8       Recent Labs     10/19/20  0639 10/20/20  0656 10/21/20  0602   ALKPHOS 125* 154* 177*   PROT 5.6* 6.2* 5.6*   LABALBU 3.6 3.8 3.5   BILITOT 0.2 0.2 0.3   AST 56* 77* 38*   ALT 68* 80* 65*       Recent Labs     10/19/20  0639 10/20/20  1420   WBC 6.6 10.4   RBC 2.91* 2.96*   HGB 8.9* 9.3*   HCT 29.9* 30.9*   .7* 104.4*   MCH 30.6 31.4   MCHC 29.8* 30.1*   RDW 21.2* 21.0*    272   MPV 9.9 10.7       Radiology:   CT HEAD WO CONTRAST   Final Result   No acute intracranial abnormality. US DUP LOWER EXTREMITIES BILATERAL VENOUS   Final Result   No evidence of DVT in either lower extremity. XR CHEST PORTABLE   Final Result   1. No active pulmonary disease. CT HEAD WO CONTRAST   Final Result   1. No acute intracranial abnormality. XR HAND LEFT (MIN 3 VIEWS)   Final Result   Addendum 1 of 1   ADDENDUM:   Recommend clinical correlation for point tenderness involving the 5th   metacarpal neck. Final      XR KNEE LEFT (3 VIEWS)   Final Result   1. No acute abnormality involving the left knee. XR CHEST PORTABLE   Final Result   No evidence of pneumonia or pleural effusion. Assessment/Plan:  Principal Problem:    Uncontrolled type 1 diabetes mellitus with hyperglycemia, with long-term current use of insulin (HCC)  Active Problems:    ESRD on peritoneal dialysis ([address]    Severe protein-calorie malnutrition ([address]    Acute cystitis without hematuria    Hypertension    Hypothyroidism    Hyperlipidemia    Hyperglycemia    Nondisplaced fracture of neck of fifth metacarpal bone, left hand, initial encounter for closed fracture    Acute encephalopathy    Effusion of left knee    Hyperkalemia  Resolved Problems:    * No resolved hospital problems.  *      1. Acute metabolic encephalopathy(resolved but now recurred)   -on admission due to prolonged hypoglycemia and hypotension  -acute worsening over the last 24 hours. Work up in process. 2.  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  -On vancomycin and cefepime per infectious disease but the latter stopped today. Ammonia level ordered as were ABGs the latter consistent with metabolic acidosis. -YULISSA from 10/19 showed large vegetation 2 x 1 cm in the right atrium as well as 1 x1 cm vegetation. On 10/20,  made call through [facility] and spoke to triage cardiologist at the [facility] Dr. Rosalyn Hammans who accepted patient. Bed availability pending.  -in review of patient's chart was able to see that mediport was placed April 2018 by IR. 3.  Uncontrolled type 1 diabetes mellitus   -Blood sugars remain extremely labile  -Ultimately she will need continuous glucose monitor and insulin pump. She is following with endocrinology as an outpatient    4. Hyperkalemia   -Continue scheduled peritoneal dialysis and as needed insulin for treatment of acute hyperkalemia  --Bicarb and CaCl now, PD exchange now. 5.  UTI   -Treated with fosfomycin on 10/9    6. Left fifth metacarpal fracture  -Splinted by orthopedic surgery  -Nonweightbearing per orthopedic surgery recommendation    7. Chronic low back pain   -Heating pad and Norco as needed    8. Left knee traumatic effusion   -wrapped per orthopedic surgery    9. ESRD on peritoneal dialysis  -Continue nightly peritoneal dialysis regimen per nephrology    10. Hypothyroidism  -Continue levothyroxine    11. Hyperlipidemia  -Continue atorvastatin    Disposition: to [facility] when bed available. Case discussed with nephrology CNP at bedside. NOTE: This report was transcribed using voice recognition software.  Every effort was made to ensure accuracy; however, inadvertent computerized transcription errors may be present.      Electronically signed by Marlene Moss MD on 10/21/2020 at 1:21 PM

## 2020-10-21 NOTE — CARE COORDINATION
10/21/2020 1045 CM note: NEGATIVE COVID TESTING 10/20/20. YULISSA was positive for vegetation and plan is to transfer to [facility]. Per QFR, [facility] bed availability checked this am and at this time no available bed for transfer.  Mia ALVAREZ

## 2020-10-21 NOTE — PROGRESS NOTES
Pharmacy Consultation Note  (Antibiotic Dosing and Monitoring)    Initial consult date: 10/10/20  Consulting physician: Dr. Rex Tracey  Drug(s): Vancomycin  Indication: Positive blood culture    Ht Readings from Last 1 Encounters:   10/16/20 5' 4\" (1.626 m)     Wt Readings from Last 1 Encounters:   10/20/20 163 lb 5.8 oz (74.1 kg)         Age/  Gender IBW DW  Allergy Information   31 y.o.     female 54.7 kg 58.1 kg  Toradol [ketorolac tromethamine]                 Date  WBC CCPD Drug/Dose Time   Given Level(s)   (Time) Comments   10/10  (#1) 6.8 QHS  Vancomycin 1,000 mg IV x 1 1822     10/11  (#2) 7.7 QHS No dose -- 20.0 mcg/mL @ 1324    10/12  (#3) -- QHS No dose -- 16.4 mcg/mL @ 1511    10/13  (#4) 6.8 QHS Vancomycin 500 mg IV once 1703 14.1 mcg/mL @ 4108    10/14  (#5) -- QHS No dose --     10/15  (#6) -- QHS Vancomycin 750 mg IV once 1843 10.1 mcg/mL @ 1655    10/16  (#7) 7.3 QHS No dose -- 24.6 mcg/mL @ 1400    10/17  (#8) -- QHS No dose -- 19.6 mcg/mL @ 7911    10/18  (#9) -- QHS Vancomycin 500 mg IV once 1747 17.2 mcg/mL @ 1430    10/19  (#10) 6.6 QHS Vancomycin 750 mg IV Q72H --     10/20  (#11) 10.4 NO PD Vancomycin 750 mg IV Q72H --     10/21 -- 2 exchanges Vancomycin 750 mg IV Q72H <1800> 17.5 mcg/mL @ 1315      Estimated Creatinine Clearance: 9 mL/min (A) (based on SCr of 8.7 mg/dL (HH)). UOP over the past 24 hours:       Intake/Output Summary (Last 24 hours) at 10/21/2020 1423  Last data filed at 10/21/2020 1317  Gross per 24 hour   Intake 120 ml   Output --   Net 120 ml       Temp max: Temp (24hrs), Av.1 °F (37.3 °C), Min:98.3 °F (36.8 °C), Max:100.2 °F (37.9 °C)      Antibiotic Regimen: No other antibiotics at this time  Antibiotic Dose Date Initiated   Cefepime 500 mg x 1  1 g q24h 10/13  10/14     Cultures:  available culture and sensitivity results were reviewed in EPIC  Cultures sent and are pending.   Culture Date Result    Urine 10/8 E faecalis   Blood #1 10/8 Methicillin-resistant CoNS

## 2020-10-22 LAB
ALBUMIN SERPL-MCNC: 3.5 G/DL (ref 3.5–5.2)
ALP BLD-CCNC: 165 U/L (ref 35–104)
ALT SERPL-CCNC: 55 U/L (ref 0–32)
ANION GAP SERPL CALCULATED.3IONS-SCNC: 19 MMOL/L (ref 7–16)
APTT: 181 SEC (ref 24.5–35.1)
APTT: 212.8 SEC (ref 24.5–35.1)
APTT: 38.5 SEC (ref 24.5–35.1)
APTT: 56.7 SEC (ref 24.5–35.1)
AST SERPL-CCNC: 29 U/L (ref 0–31)
BILIRUB SERPL-MCNC: 0.2 MG/DL (ref 0–1.2)
BUN BLDV-MCNC: 60 MG/DL (ref 6–20)
CALCIUM SERPL-MCNC: 8.9 MG/DL (ref 8.6–10.2)
CHLORIDE BLD-SCNC: 92 MMOL/L (ref 98–107)
CO2: 22 MMOL/L (ref 22–29)
CREAT SERPL-MCNC: 8.2 MG/DL (ref 0.5–1)
GFR AFRICAN AMERICAN: 7
GFR NON-AFRICAN AMERICAN: 7 ML/MIN/1.73
GLUCOSE BLD-MCNC: 240 MG/DL (ref 74–99)
HCT VFR BLD CALC: 30.8 % (ref 34–48)
HEMOGLOBIN: 10 G/DL (ref 11.5–15.5)
MCH RBC QN AUTO: 31.5 PG (ref 26–35)
MCHC RBC AUTO-ENTMCNC: 32.5 % (ref 32–34.5)
MCV RBC AUTO: 97.2 FL (ref 80–99.9)
METER GLUCOSE: 159 MG/DL (ref 74–99)
METER GLUCOSE: 215 MG/DL (ref 74–99)
METER GLUCOSE: 261 MG/DL (ref 74–99)
METER GLUCOSE: 81 MG/DL (ref 74–99)
PDW BLD-RTO: 18.4 FL (ref 11.5–15)
PLATELET # BLD: 279 E9/L (ref 130–450)
PMV BLD AUTO: 10.8 FL (ref 7–12)
POTASSIUM SERPL-SCNC: 4.7 MMOL/L (ref 3.5–5)
RBC # BLD: 3.17 E12/L (ref 3.5–5.5)
SODIUM BLD-SCNC: 133 MMOL/L (ref 132–146)
TOTAL PROTEIN: 6.1 G/DL (ref 6.4–8.3)
WBC # BLD: 9 E9/L (ref 4.5–11.5)

## 2020-10-22 PROCEDURE — 80053 COMPREHEN METABOLIC PANEL: CPT

## 2020-10-22 PROCEDURE — 2580000003 HC RX 258: Performed by: INTERNAL MEDICINE

## 2020-10-22 PROCEDURE — 90945 DIALYSIS ONE EVALUATION: CPT

## 2020-10-22 PROCEDURE — 85027 COMPLETE CBC AUTOMATED: CPT

## 2020-10-22 PROCEDURE — 82962 GLUCOSE BLOOD TEST: CPT

## 2020-10-22 PROCEDURE — 85730 THROMBOPLASTIN TIME PARTIAL: CPT

## 2020-10-22 PROCEDURE — 6360000002 HC RX W HCPCS: Performed by: INTERNAL MEDICINE

## 2020-10-22 PROCEDURE — 2700000000 HC OXYGEN THERAPY PER DAY

## 2020-10-22 PROCEDURE — 6370000000 HC RX 637 (ALT 250 FOR IP): Performed by: INTERNAL MEDICINE

## 2020-10-22 PROCEDURE — 99233 SBSQ HOSP IP/OBS HIGH 50: CPT | Performed by: INTERNAL MEDICINE

## 2020-10-22 PROCEDURE — 36592 COLLECT BLOOD FROM PICC: CPT

## 2020-10-22 PROCEDURE — 1200000000 HC SEMI PRIVATE

## 2020-10-22 PROCEDURE — 36415 COLL VENOUS BLD VENIPUNCTURE: CPT

## 2020-10-22 RX ORDER — ONDANSETRON 2 MG/ML
4 INJECTION INTRAMUSCULAR; INTRAVENOUS EVERY 6 HOURS PRN
Status: DISCONTINUED | OUTPATIENT
Start: 2020-10-22 | End: 2020-10-24 | Stop reason: HOSPADM

## 2020-10-22 RX ORDER — METOPROLOL TARTRATE 5 MG/5ML
2.5 INJECTION INTRAVENOUS EVERY 6 HOURS PRN
Status: DISCONTINUED | OUTPATIENT
Start: 2020-10-22 | End: 2020-10-24 | Stop reason: HOSPADM

## 2020-10-22 RX ADMIN — DEXTROSE MONOHYDRATE 100 ML/HR: 50 INJECTION, SOLUTION INTRAVENOUS at 14:38

## 2020-10-22 RX ADMIN — HEPARIN SODIUM AND DEXTROSE 15.98 UNITS/KG/HR: 10000; 5 INJECTION INTRAVENOUS at 20:58

## 2020-10-22 RX ADMIN — Medication 10 ML: at 22:30

## 2020-10-22 RX ADMIN — HEPARIN SODIUM AND DEXTROSE 15.98 UNITS/KG/HR: 10000; 5 INJECTION INTRAVENOUS at 04:36

## 2020-10-22 RX ADMIN — HEPARIN SODIUM 5930 UNITS: 1000 INJECTION, SOLUTION INTRAVENOUS; SUBCUTANEOUS at 23:50

## 2020-10-22 RX ADMIN — INSULIN LISPRO 3 UNITS: 100 INJECTION, SOLUTION INTRAVENOUS; SUBCUTANEOUS at 12:58

## 2020-10-22 ASSESSMENT — PAIN SCALES - GENERAL
PAINLEVEL_OUTOF10: 0
PAINLEVEL_OUTOF10: 1
PAINLEVEL_OUTOF10: 0
PAINLEVEL_OUTOF10: 0

## 2020-10-22 NOTE — PROGRESS NOTES
12 hour cc   New aptt 38.5. Dr. Mike Tom notified. Restart heparin at current rate of 15.98 unit/kg/hr. Recheck aptt in 6 hours.

## 2020-10-22 NOTE — PROGRESS NOTES
times per day     Continuous Infusions:   heparin (PORCINE) Infusion 15.98 Units/kg/hr (10/22/20 0436)    dextrose 100 mL/hr (10/21/20 1901)     PRN Meds:.heparin (porcine), heparin (porcine), oxyCODONE **OR** oxyCODONE, sodium chloride flush, heparin flush, sodium chloride flush, heparin flush, acetaminophen, cloNIDine, LORazepam, acetaminophen **OR** acetaminophen, magnesium hydroxide, promethazine **OR** ondansetron, glucose, dextrose, glucagon (rDNA), dextrose      DATA:    CBC:   Lab Results   Component Value Date    WBC 9.0 10/22/2020    RBC 3.17 10/22/2020    HGB 10.0 10/22/2020    HCT 30.8 10/22/2020    MCV 97.2 10/22/2020    MCH 31.5 10/22/2020    MCHC 32.5 10/22/2020    RDW 18.4 10/22/2020     10/22/2020    MPV 10.8 10/22/2020     CMP:    Lab Results   Component Value Date     10/22/2020    K 4.7 10/22/2020    K 3.7 10/11/2020    CL 92 10/22/2020    CO2 22 10/22/2020    BUN 60 10/22/2020    CREATININE 8.2 10/22/2020    GFRAA 7 10/22/2020    LABGLOM 7 10/22/2020    GLUCOSE 240 10/22/2020    GLUCOSE 130 05/18/2012    PROT 6.1 10/22/2020    LABALBU 3.5 10/22/2020    LABALBU 4.1 05/18/2012    CALCIUM 8.9 10/22/2020    BILITOT 0.2 10/22/2020    ALKPHOS 165 10/22/2020    AST 29 10/22/2020    ALT 55 10/22/2020     Magnesium:    Lab Results   Component Value Date    MG 2.3 10/14/2020     Phosphorus:    Lab Results   Component Value Date    PHOS 8.9 10/14/2020     Radiology Review:      Chest x-ray October 8, 2020   No evidence of pneumonia or pleural effusion.               BRIEF SUMMARY OF INITIAL CONSULT:    Briefly, Tatiana Velasquez is a 31year-old female with history of ESRD on peritoneal dialysis (CCPD), poorly controlled type I DM with multiple admissions for DKA, gastroparesis, HTN  UTI, who was admitted October 8, 2020 after she presented to the ER reporting feeling unwell, having lower back pain and vomiting. In the ER her glucose was 570 mg/dL.   Her urine showed >20 WBCs.    IMPRESSION/RECOMMENDATIONS:      1. ESRD on peritoneal dialysis/CCPD. · PD was held last evening. She is hyperkalemic and acidotic today. To resume PD with one exchange today and cycler tonight. 2. Hyperkalemia, multifactorial, 2/2 hyperglycemia, ESRD and K supplementation (orange juice). · K 5.9 today. She is a brittle diabetic so we will hold off on insulin and D50- to give calcium gluconate and sodium bicarbonate now and resume PD today. 3. Acidemia, pH 3.576, with metabolic acidosis (HAGMA 2/2 uremia, ? Lactic acidosis)  4. Coagulase-negative Staphylococcus bacteremia, probably source MediPort, on vancomycin and s/p cefepime (discontinued today due to neuro changes) per ID; YULISSA + vegetation/thrombus    5. 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 Mediport will need to be removed. 6. Acute encephalopathy, metabolic 2/2 acidemia, ? Cefepime related  7. E faecalis bacteriuria  8. HTN, on metoprolol and diltiazem  9. Type I DM, poorly controlled  10. Left fifth metacarpal fracture, status post fall  11. Left knee effusion, traumatic  12.  Anemia of CKD, on epoetin alpha 8,000 units tiw    Plan:    · CCPD last night tolerated well with 881 ml UF  · Continue metolazone 5 mg daily  · Continue diltiazem 240 mg daily  · Continue metoprolol 50 mg twice a day  · Continue low potassium diet, no orange juice for hypoglycemia  · Plans for transfer to [facility] noted       Electronically signed by Micha Arguello MD on 10/22/2020 at 11:44 AM

## 2020-10-22 NOTE — PROGRESS NOTES
Comprehensive Nutrition Assessment    Type and Reason for Visit:  Reassess    Nutrition Recommendations/Plan: Will start Nepro once daily to supplement intake. Nutrition Assessment:  Pt declining from a nutritional standpoint w/ decreased intake and loss of appetite over past 3 days. S/p YULISSA positive for vegetation, pending transfer to [facility]. Will start ONS and monitor. Malnutrition Assessment:  Malnutrition Status: At risk for malnutrition (Comment)    Context:  Chronic Illness     Findings of the 6 clinical characteristics of malnutrition:  Energy Intake:  7 - 75% or less estimated energy requirements for 1 month or longer  Weight Loss:  No significant weight loss     Body Fat Loss:  No significant body fat loss     Muscle Mass Loss:  No significant muscle mass loss    Fluid Accumulation:  No significant fluid accumulation     Strength:  Not Performed    Estimated Daily Nutrient Needs:  Energy (kcal):  4648-9333(30-32 kcal/kg); Weight Used for Energy Requirements:  Admission     Protein (g):  (1.5-1.8 gm/kg);  Weight Used for Protein Requirements:  Admission        Fluid (ml/day):  1500 ml fluid restriction; Weight Used for Fluid Requirements:  Admission      Nutrition Related Findings:  pt alert, abd soft rounded, loss of appetite, -1.8L I/O, trace BLE edema, renal labs elevated, blood glucose variable, GI labs elevated, CCPD regimen provides: ~230 kcal/day      Wounds:  None(abrasion noted)       Current Nutrition Therapies:    DIET GENERAL; Carb Control: 4 carb choices (60 gms)/meal; Daily Fluid Restriction: 1500 ml; Low Potassium    Anthropometric Measures:  · Height: 5' 4\" (162.6 cm)  · Current Body Weight: 122 lb (55.3 kg)(Using admit wt, 143.1 lb current wt 10/22)   · Admission Body Weight: 122 lb (55.3 kg)(10/8 actual)    · Usual Body Weight: 129 lb 3 oz (58.6 kg)(12/26/19 actual per EMR, variable wt hx likely r/t fluid shift)     · Ideal Body Weight: 120 lbs; % Ideal Body Weight 101.7 % · BMI: 20.9  · Adjusted Body Weight:  ; No Adjustment   · BMI Categories: Normal Weight (BMI 18.5-24. 9)       Nutrition Diagnosis:   · Inadequate protein intake related to renal dysfunction as evidenced by dialysis, poor intake prior to admission, intake 26-50%    Nutrition Interventions:   Food and/or Nutrient Delivery:  Continue Current Diet, Start Oral Nutrition Supplement(Will start Nepro once daily to supplement intake)  Nutrition Education/Counseling:  Education not indicated   Coordination of Nutrition Care:  Continued Inpatient Monitoring    Goals:  Pt is to consume >50% of most meals/ONS       Nutrition Monitoring and Evaluation:   Behavioral-Environmental Outcomes:  Knowledge or Skill   Food/Nutrient Intake Outcomes:  Food and Nutrient Intake, Supplement Intake  Physical Signs/Symptoms Outcomes:  Biochemical Data, GI Status, Fluid Status or Edema, Hemodynamic Status, Nutrition Focused Physical Findings, Skin, Weight     Discharge Planning:     Too soon to determine     Electronically signed by Kathy Haas RD, LD on 10/22/20 at 4:36 PM EDT    Contact: [phone]

## 2020-10-22 NOTE — PROGRESS NOTES
[facility]  [facility]  Progress Note    NAME:Wilton Flores  1990  DATE OF SERVICE:10/22/20    Pt was seen FACE TO FACE FOR atbx    SUBJECTIVE:  Chief Complaint   Patient presents with    Hyperglycemia     blood sugar high today vomiting back pain   more awake but still lethargic  No abd pain   No f/c  Patient is tolerating medications. No reported adverse drug reactions. Review of systems:  As stated above in the chief complaint, otherwise negative.     Medications:  Scheduled Meds:   vancomycin  750 mg Intravenous Q72H    insulin lispro  5 Units Subcutaneous TID WC    [Held by provider] insulin glargine  12 Units Subcutaneous QAM    lactobacillus  1 capsule Oral Q12H    metoprolol  50 mg Oral BID    sodium chloride  1,000 mL Intravenous Once    sodium chloride  1,000 mL Intravenous Once    lidocaine  5 mL Intradermal Once    heparin flush  3 mL Intravenous 2 times per day    lidocaine  5 mL Intradermal Once    heparin flush  1 mL Intravenous 2 times per day    insulin lispro  0-6 Units Subcutaneous TID WC    insulin lispro  0-3 Units Subcutaneous Nightly    epoetin nena-epbx  8,000 Units Subcutaneous Once per day on Mon Wed Fri    [Held by provider] insulin NPH  5 Units Subcutaneous Nightly    sodium chloride flush  10 mL Intravenous 2 times per day    atorvastatin  40 mg Oral Nightly    dilTIAZem  240 mg Oral Daily    gabapentin  100 mg Oral TID    levothyroxine  50 mcg Oral Daily    metOLazone  5 mg Oral Daily    sodium chloride flush  10 mL Intravenous 2 times per day     Continuous Infusions:   heparin (PORCINE) Infusion 15.98 Units/kg/hr (10/22/20 1617)    dextrose 100 mL/hr (10/22/20 1438)     PRN Meds:heparin (porcine), heparin (porcine), oxyCODONE **OR** oxyCODONE, sodium chloride flush, heparin flush, sodium chloride flush, heparin flush, acetaminophen, cloNIDine, LORazepam, acetaminophen **OR** acetaminophen, magnesium hydroxide, promethazine **OR** ondansetron, glucose, dextrose, glucagon (rDNA), dextrose    OBJECTIVE:  BP (!) 135/93   Pulse 83   Temp 96.8 °F (36 °C) (Oral)   Resp 16   Ht 5' 4\" (1.626 m)   Wt 143 lb 1.3 oz (64.9 kg)   SpO2 100%   BMI 24.56 kg/m²   Temp  Av.6 °F (35.9 °C)  Min: 96.3 °F (35.7 °C)  Max: 96.8 °F (36 °C)  Constitutional:  The patient is lethargic open eyes  Skin:    Warm and dry. HEENT:      AT/NC. Neck supple  Chest:   No use of accessory muscles to breathe. Symmetrical expansion. Cardiovascular:  S1 and S2 are rhythmic and regular. Abdomen:   Pd CATH distended nt soft  Extremities:    edema.  LEFT UE ACED  CNS    NAD  Lines: LEFT MEDIPORT   Right arm with line- no phlebitis- 10/13/2020    Radiology:  Laboratory and Tests Review:  Lab Results   Component Value Date    WBC 9.0 10/22/2020    WBC 10.4 10/20/2020    WBC 6.6 10/19/2020    HGB 10.0 (L) 10/22/2020    HCT 30.8 (L) 10/22/2020    MCV 97.2 10/22/2020     10/22/2020     No results found for: [facility]  Lab Results   Component Value Date    ALT 55 (H) 10/22/2020    AST 29 10/22/2020    ALKPHOS 165 (H) 10/22/2020    BILITOT 0.2 10/22/2020     Lab Results   Component Value Date     10/22/2020    K 4.7 10/22/2020    K 3.7 10/11/2020    CL 92 10/22/2020    CO2 22 10/22/2020    BUN 60 10/22/2020    CREATININE 8.2 10/22/2020    CREATININE 8.8 10/21/2020    CREATININE 9.1 10/21/2020    GFRAA 7 10/22/2020    LABGLOM 7 10/22/2020    GLUCOSE 240 10/22/2020    GLUCOSE 130 2012    PROT 6.1 10/22/2020    LABALBU 3.5 10/22/2020    LABALBU 4.1 2012    CALCIUM 8.9 10/22/2020    BILITOT 0.2 10/22/2020    ALKPHOS 165 10/22/2020    AST 29 10/22/2020    ALT 55 10/22/2020     Lab Results   Component Value Date    CRP 43.1 (H) 2019    CRP 0.3 10/02/2019    CRP 0.4 2017     Lab Results   Component Value Date    SEDRATE 19 10/13/2020    SEDRATE 135 (H) 2019    SEDRATE 14 2017       Microbiology:   Recent Labs 10/20/20  1415   COVID19 Not Detected     Lab Results   Component Value Date    BLOODCULT2 5 Days no growth 07/18/2020    BLOODCULT2 5 Days- no growth 02/09/2020    BLOODCULT2 5 Days- no growth 02/08/2020    ORG Staphylococcus epidermidis 10/08/2020    ORG Staphylococcus epidermidis 10/08/2020    ORG Enterococcus faecalis 10/08/2020     WOUND/ABSCESS   Date Value Ref Range Status   12/11/2014 (A)  Final    Mixed yulisa also isolated, including:  Alpha hemolytic Strep species  Corynebacteria     12/11/2014 Heavy growth  Final     MRSA Culture Only   Date Value Ref Range Status   10/13/2020 Methicillin resistant Staph aureus not isolated  Final     ASSESSMENT:  Cons bacteremia has left MediPort   Rule out line infection +YULISSA    Summary   Large irregular, 2cm x 1cm, mobile, echogenic mass attached to the right   atrial side of interatrial septum near SVC opening suggestive of   vegetation or thrombus. Large, 1cm x 1cm, irregular echogenic mass attached to the tip of the   central venous catheter suggestive of vegetation or thrombus. Normal left ventricular systolic function. Interatrial septum intact. Agitated saline injection showed no right to left shunt. Normal right ventricle size and function. There is mild tricuspid regurgitation. Posterior mitral leaflet prolapse. There is moderate tricuspid regurgitation. Normal aortic root size. No evidence of pericardial effusion. Pericardium appears normal.   Technically sub-optimal images. Compared to prior TTE from 1/13/2020. E. faecalis bacteruria   CKD PD CATH   S/p fall -  traumatic nondisplaced fracture involving the 5th    metacarpal neck     Lethargy- decreased mentation- ? Cefepime     Plan:   · Continue vancomycin- next dose scheduled for 10/21/2020 plan 4- 6 weks  · Followed by pharmacy     · For [facility] to [facility]- awaiting bed    Imaging and labs were reviewed per medical records.      The patient was educated about the diagnosis, prognosis, indications, risks and benefits of treatment. An opportunity to ask questions was given to the patient/ . Thank you for involving me in the care of [address] [place] [address]. Please do not hesitate to call for any questions or concerns.        Electronically signed by Miri Muñoz MD on 10/22/2020 at 7:51 PM    Phone [phone]  Fax [phone]

## 2020-10-22 NOTE — ADT AUTH CERT
Diabetes - Care Day 15 (10/22/2020) by Wayman Schlatter, RN         Review Status  Review Entered    Completed  10/22/2020 15:29        Criteria Review       Care Day: 15 Care Date: 10/22/2020 Level of Care: Telemetry    Guideline Day 2    Clinical Status    (X) * Hypotension absent    (X) * Mental status at baseline    10/22/2020 3:29 PM EDT by Walt Ambriz      baseline    ( ) * Acidosis absent or improved    10/22/2020 3:29 PM EDT by Walt Ambriz      anion gap 19    (X) * Blood glucose under acceptable control or improved    10/22/2020 3:29 PM EDT by Walt Ambriz      215- 261    ( ) * Dehydration absent    (X) * Electrolyte abnormalities absent or improved    ( ) * Renal function at baseline or improved    10/22/2020 3:29 PM EDT by Walt Ambriz      creatinine 8.2 BUN 60    Activity    (X) * Ambulatory    Routes    ( ) * Oral hydration, and medications    10/22/2020 3:29 PM EDT by Walt Ambriz      heparin drip IV @ 11.8ml/hr, D5 IV @100ml/hr,    (X) [place] diet, advance as tolerated    Interventions    (X) Serum glucose, serum ketones, chemistries, ABGs or venous pH measurements, urinalysis    (X) Bedside glucose monitoring    Medications    (X) * IV insulin absent    10/22/2020 3:29 PM EDT by Walt Ambriz      absent    (X) * Outpatient diabetic medication regimen initiated    (X) Subcutaneous insulin    * Milestone    Additional Notes    10/22/2020 care day 15    VS T 96.7 P 80 R 16 /87 spo2 100% on 2L O2 per nc       Labs    Chloride: 92 (L)    BUN: 60 (H)    Creatinine: 8.2 (HH)    Anion Gap: 19 (H)    Glucose: 240 (H)    Total Protein: 6.1 (L)    Alk Phos: 165 (H)    ALT: 55 (H)    RBC: 3.17 (L)    Hemoglobin Quant: 10.0 (L)    Hematocrit: 30.8 (L)    RDW: 18.4 (H)    Meter Glucose: 215 (H)    aPTT: 212.8 (H)          Meds: lipitor 40mg po nightly, insulin lispro ss subq four times daily, insulin lispro 5 units subq three times daily, D5 IV @100ml/hr, heparin drip Iv @ 11.8ml/hr,             nephrology note    Events reviewed. Vegetation per YULISSA, plan for transfer to [facility].         SUBJECTIVE:  We are following [address] [place] [address] for ESRD on peritoneal dialysis. Amanda Barnett is very lethargic today, unable to follow commands       IMPRESSION/RECOMMENDATIONS:           1. ESRD on peritoneal dialysis/CCPD. · PD was held last evening. She is hyperkalemic and acidotic today. To resume PD with one exchange today and cycler tonight.         2. Hyperkalemia, multifactorial, 2/2 hyperglycemia, ESRD and K supplementation (orange juice). · K 5.9 today. She is a brittle diabetic so we will hold off on insulin and D50- to give calcium gluconate and sodium bicarbonate now and resume PD today.         3. Acidemia, pH 9.325, with metabolic acidosis (HAGMA 2/2 uremia, ? Lactic acidosis)    4. Coagulase-negative Staphylococcus bacteremia, probably source MediPort, on vancomycin and s/p cefepime (discontinued today due to neuro changes) per ID; YULISSA + vegetation/thrombus         5. 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 Jessica Clink will need to be removed.         6. Acute encephalopathy, metabolic 2/2 acidemia, ? Cefepime related    7. E faecalis bacteriuria    8. HTN, on metoprolol and diltiazem    9. Type I DM, poorly controlled    10. Left fifth metacarpal fracture, status post fall    11. Left knee effusion, traumatic    12.  Anemia of CKD, on epoetin alpha 8,000 units tiw         Plan:         · CCPD last night tolerated well with 881 ml UF    · Continue metolazone 5 mg daily    · Continue diltiazem 240 mg daily    · Continue metoprolol 50 mg twice a day    · Continue low potassium diet, no orange juice for hypoglycemia    · Plans for transfer to [facility] noted        Diabetes - Care Day 14 (10/21/2020) by Dima Rivers RN         Review Status  Review Entered    Completed  10/22/2020 15:27        Criteria Review       Care Day: 14 Care Date: 10/21/2020 Level of Care: Telemetry    Guideline Day 2    Clinical Status    (X) * Hypotension absent    10/22/2020 3:27 PM EDT by Bebeto Camargo      BP 92/50    (X) * Mental status at baseline    10/22/2020 3:27 PM EDT by Bebeto Camargo      baseline    ( ) * Acidosis absent or improved    10/22/2020 3:27 PM EDT by Bebeto Camargo      anion gap 22    ( ) * Blood glucose under acceptable control or improved    10/22/2020 3:27 PM EDT by Bebeto Camargo      87 - 394    ( ) * Dehydration absent    ( ) * Electrolyte abnormalities absent or improved    10/22/2020 3:27 PM EDT by Bebeto Camargo      potassium 5.9    ( ) * Renal function at baseline or improved    10/22/2020 3:27 PM EDT by Bebeto Camargo      creatinine 8.7 BUN 65    Activity    (X) * Ambulatory    Routes    ( ) * Oral hydration, and medications    10/22/2020 3:27 PM EDT by Bebeto Camargo      heparin drip IV @ 14.1ml/hr, D5 IV @100ml/hr, vancomycin 750mg IV Q72hrs, calcium gluconate 1gm IV x1, D50% 12.5gm IV PRN x3    (X) [place] diet, advance as tolerated    Interventions    (X) Serum glucose, serum ketones, chemistries, ABGs or venous pH measurements, urinalysis    10/22/2020 3:27 PM EDT by Jorge Luis Todd see notes for lab values    (X) Bedside glucose monitoring    10/22/2020 3:27 PM EDT by Bebeto Camargo      4 times daily and PRN    Medications    (X) * IV insulin absent    10/22/2020 3:27 PM EDT by Bebeto Camargo      absent    (X) * Outpatient diabetic medication regimen initiated    (X) Subcutaneous insulin    (X) Potassium and other electrolyte supplements as indicated    * Milestone    Additional Notes    10/21/2020 care day 14    VS T 98.3 P 99 R 16 BP 92/50 spo2 100% on 2L O2 per nc       Labs    Potassium: 5.9 (H)    Chloride: 95 (L)    CO2: 17 (L)    BUN: 65 (H)    Creatinine: 8.7 (HH)    Anion Gap: 22 (H)    Glucose: 401 (H)    Total Protein: 5.6 (L)    Alk Phos: 177 (H)    ALT: 65 (H)    AST: 38 (H)       10/21/2020 10:30    aPTT: >240.0 (H)       10/21/2020 12:39    Source[de-identified] Blood Arterial    pH, Blood Gas: 7.280 (L)    PCO2: 40.1    pO2: 102.3 (H)    HCO3: 18.4 (L)    Base Excess: -7.8 (L)    O2 Sat: 96.7    tHb (est): 10.3 (L)          Meds: calcium gluconate 1gm IV x1, cardizem 240mg po daily, retacrit 8000 units subq x1, lantus 12 units subq daily, insulin lispro ss subq four times daily, insulin lispro 5 units subq x2, zaroxolyn 5mg po daily, lopressor 50mg po x1, sodium bicarbonate 50meq IV x1, vancomycin 750mg IV Q72hrs, D5 IV @100ml/hr, heparin drip IV @ 14.1ml/hr, D50% 12.5gm IV PRN x3, heparin 5930 units IV BOLUS x1,          nephrology note   IMPRESSION/RECOMMENDATIONS:           1. ESRD on peritoneal dialysis/CCPD. · PD was held last evening. She is hyperkalemic and acidotic today. To resume PD with one exchange today and cycler tonight.         2. Hyperkalemia, multifactorial, 2/2 hyperglycemia, ESRD and K supplementation (orange juice). · K 5.9 today. She is a brittle diabetic so we will hold off on insulin and D50- to give calcium gluconate and sodium bicarbonate now and resume PD today.         3. Acidemia, pH 9.424, with metabolic acidosis (HAGMA 2/2 uremia, ? Lactic acidosis)    4. Coagulase-negative Staphylococcus bacteremia, probably source MediPort, on vancomycin and s/p cefepime (discontinued today due to neuro changes) per ID; YULISSA + vegetation/thrombus         5. 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  [facility] will need to be removed.         6. Acute encephalopathy, metabolic 2/2 acidemia, ? Cefepime related    7. E faecalis bacteriuria    8. HTN, on metoprolol and diltiazem    9. Type I DM, poorly controlled    10. Left fifth metacarpal fracture, status post fall    11. Left knee effusion, traumatic    12. Anemia of CKD, on epoetin alpha 8,000 units tiw         Plan:         · Repeat BMP stat now    · Give 1 amp of sodium bicarbonate 50 mEq IV x 1    · Calcium gluconate 1 g IV x 1    · CCPD held last night, to resume today with one exchange today and cycler tonight    · Continue metolazone 5 mg daily    · Continue diltiazem 240 mg daily    · Continue metoprolol 50 mg twice a day    · Continue low potassium diet, no orange juice for hypoglycemia    · Plans for transfer to [facility] noted          ID NOTE         ASSESSMENT:    Cons bacteremia has left MediPort    Rule out line infection +YULISSA      Summary     Large irregular, 2cm x 1cm, mobile, echogenic mass attached to the right     atrial side of interatrial septum near SVC opening suggestive of     vegetation or thrombus.     Large, 1cm x 1cm, irregular echogenic mass attached to the tip of the     central venous catheter suggestive of vegetation or thrombus.     Normal left ventricular systolic function.     Interatrial septum intact.     Agitated saline injection showed no right to left shunt.     Normal right ventricle size and function.     There is mild tricuspid regurgitation.     Posterior mitral leaflet prolapse.     There is moderate tricuspid regurgitation.     Normal aortic root size.     No evidence of pericardial effusion.     Pericardium appears normal.     Technically sub-optimal images.     Compared to prior TTE from 1/13/2020.         E. faecalis bacteruria    CKD PD CATH    S/p fall -    traumatic nondisplaced fracture involving the 5th    metacarpal neck         Lethargy- decreased mentation- ?  Cefepime         Plan:    · Continue vancomycin- next dose scheduled for 10/21/2020    · Followed by pharmacy    · Stop cefepime    · Check ammonia level, check abgs, and blood cultures today    · For transfer to [facility]- awaiting bed          IM NOTE        1.  Acute metabolic encephalopathy(resolved but now recurred)    -on admission due to prolonged hypoglycemia and hypotension    -acute worsening over the last 24 hours. Work up in process.         2.  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    -On vancomycin and cefepime per infectious disease but the latter stopped today. Ammonia level ordered as were ABGs the latter consistent with metabolic acidosis. -YULISSA from 10/19 showed large vegetation 2 x 1 cm in the right atrium as well as 1 x1 cm vegetation. On 10/20,  made call through [facility] and spoke to triage cardiologist at the [facility] who accepted patient.  Bed availability pending.    -in review of patient's chart was able to see that mediport was placed April 2018 by IR.         3.  Uncontrolled type 1 diabetes mellitus    -Blood sugars remain extremely labile    -Ultimately she will need continuous glucose monitor and insulin pump.  She is following with endocrinology as an outpatient         4.  Hyperkalemia    -Continue scheduled peritoneal dialysis and as needed insulin for treatment of acute hyperkalemia    --Bicarb and CaCl now, PD exchange now.         5.  UTI    -Treated with fosfomycin on 10/9         6.  Left fifth metacarpal fracture    -Splinted by orthopedic surgery    -Nonweightbearing per orthopedic surgery recommendation         7.  Chronic low back pain    -Heating pad and Norco as needed         8.  Left knee traumatic effusion    -wrapped per orthopedic surgery         9.  ESRD on peritoneal dialysis    -Continue nightly peritoneal dialysis regimen per nephrology         10.  Hypothyroidism    -Continue levothyroxine         11.  Hyperlipidemia -Continue atorvastatin         Disposition: to [facility] when bed available.

## 2020-10-22 NOTE — FLOWSHEET NOTE
10/22/20 0730   Peritoneal Dialysis Catheter Tenckhoff    Placement Date: 06/26/20   Catheter Type: Tenckhoff  Catheter Location: (c)    Status Deaccessed   Site Condition No Complications   Dressing Status Clean;Dry; Intact   Peritoneal Dialysis (CAPD manual)   Effluent Appearance Clear;Yellow   Cycler   Ultrafiltration (UF) (mL) 886 mL   CCPD completed, cath de-accessed using aseptic technique. Net fluid removal  886 ml of clear yellow effluent noted, tolerated well.

## 2020-10-22 NOTE — FLOWSHEET NOTE
10/22/20 1715   Vitals   BP (!) 142/91   Temp 96.6 °F (35.9 °C)   Temp Source Axillary   Pulse 82   Resp 16   Peritoneal Dialysis Catheter Tenckhoff    Placement Date: 06/26/20   Catheter Type: Tenckhoff  Catheter Location: (c)    Site Condition No Complications   Dressing Status Changed   Dressing Gauze   Cycler   Verification of Prescription CCPD   Total Volume Programmed 24852 mL   Therapy Time (Hours:Minutes) 9   Fill Volume 2000 mL   Last Fill Volume 2000 mL   Number of Cycles 4   Bag Volume 5000 mL   Number of Bags Used 3   Dianeal Solution Dextrose 1.5% in 2000 mL  (with 1 bag 2.5%)   CCPD initiated using aseptic technique, draining and filling without complications. dressing changed

## 2020-10-22 NOTE — CARE COORDINATION
10/22/2020 1200 CM note: NEGATIVE COVID TESTING 10/20/20. YULISSA was positive for vegetation and plan is to transfer to [facility]. [facility] bed availability checked this am and at this time no available bed for transfer.  Mia ALVAREZ

## 2020-10-23 ENCOUNTER — APPOINTMENT (OUTPATIENT)
Dept: MRI IMAGING | Age: 30
DRG: 721 | End: 2020-10-23
Payer: COMMERCIAL

## 2020-10-23 LAB
ALBUMIN SERPL-MCNC: 3.4 G/DL (ref 3.5–5.2)
ALBUMIN SERPL-MCNC: 4.1 G/DL (ref 3.5–5.2)
ALP BLD-CCNC: 143 U/L (ref 35–104)
ALP BLD-CCNC: 146 U/L (ref 35–104)
ALT SERPL-CCNC: 35 U/L (ref 0–32)
ALT SERPL-CCNC: 41 U/L (ref 0–32)
ANION GAP SERPL CALCULATED.3IONS-SCNC: 18 MMOL/L (ref 7–16)
ANION GAP SERPL CALCULATED.3IONS-SCNC: 20 MMOL/L (ref 7–16)
APTT: 185.2 SEC (ref 24.5–35.1)
APTT: >240 SEC (ref 24.5–35.1)
AST SERPL-CCNC: 18 U/L (ref 0–31)
AST SERPL-CCNC: 22 U/L (ref 0–31)
BILIRUB SERPL-MCNC: 0.2 MG/DL (ref 0–1.2)
BILIRUB SERPL-MCNC: 0.2 MG/DL (ref 0–1.2)
BODY FLUID CULTURE, STERILE: NORMAL
BUN BLDV-MCNC: 46 MG/DL (ref 6–20)
BUN BLDV-MCNC: 49 MG/DL (ref 6–20)
CALCIUM SERPL-MCNC: 8.8 MG/DL (ref 8.6–10.2)
CALCIUM SERPL-MCNC: 8.9 MG/DL (ref 8.6–10.2)
CHLORIDE BLD-SCNC: 88 MMOL/L (ref 98–107)
CHLORIDE BLD-SCNC: 89 MMOL/L (ref 98–107)
CO2: 21 MMOL/L (ref 22–29)
CO2: 22 MMOL/L (ref 22–29)
CREAT SERPL-MCNC: 7.4 MG/DL (ref 0.5–1)
CREAT SERPL-MCNC: 7.9 MG/DL (ref 0.5–1)
GFR AFRICAN AMERICAN: 7
GFR AFRICAN AMERICAN: 8
GFR NON-AFRICAN AMERICAN: 7 ML/MIN/1.73
GFR NON-AFRICAN AMERICAN: 8 ML/MIN/1.73
GLUCOSE BLD-MCNC: 222 MG/DL (ref 74–99)
GLUCOSE BLD-MCNC: 436 MG/DL (ref 74–99)
GRAM STAIN RESULT: NORMAL
HCT VFR BLD CALC: 30.1 % (ref 34–48)
HEMOGLOBIN: 9.9 G/DL (ref 11.5–15.5)
MCH RBC QN AUTO: 31.5 PG (ref 26–35)
MCHC RBC AUTO-ENTMCNC: 32.9 % (ref 32–34.5)
MCV RBC AUTO: 95.9 FL (ref 80–99.9)
METER GLUCOSE: 189 MG/DL (ref 74–99)
METER GLUCOSE: 208 MG/DL (ref 74–99)
METER GLUCOSE: 210 MG/DL (ref 74–99)
METER GLUCOSE: 215 MG/DL (ref 74–99)
METER GLUCOSE: 217 MG/DL (ref 74–99)
METER GLUCOSE: 228 MG/DL (ref 74–99)
METER GLUCOSE: 381 MG/DL (ref 74–99)
PDW BLD-RTO: 17.3 FL (ref 11.5–15)
PLATELET # BLD: 281 E9/L (ref 130–450)
PMV BLD AUTO: 10.4 FL (ref 7–12)
POTASSIUM SERPL-SCNC: 3.9 MMOL/L (ref 3.5–5)
POTASSIUM SERPL-SCNC: 4.3 MMOL/L (ref 3.5–5)
RBC # BLD: 3.14 E12/L (ref 3.5–5.5)
SODIUM BLD-SCNC: 129 MMOL/L (ref 132–146)
SODIUM BLD-SCNC: 129 MMOL/L (ref 132–146)
TOTAL PROTEIN: 5.7 G/DL (ref 6.4–8.3)
TOTAL PROTEIN: 6 G/DL (ref 6.4–8.3)
WBC # BLD: 7.3 E9/L (ref 4.5–11.5)

## 2020-10-23 PROCEDURE — 6360000002 HC RX W HCPCS: Performed by: INTERNAL MEDICINE

## 2020-10-23 PROCEDURE — 6370000000 HC RX 637 (ALT 250 FOR IP): Performed by: INTERNAL MEDICINE

## 2020-10-23 PROCEDURE — 70551 MRI BRAIN STEM W/O DYE: CPT

## 2020-10-23 PROCEDURE — 2700000000 HC OXYGEN THERAPY PER DAY

## 2020-10-23 PROCEDURE — 85730 THROMBOPLASTIN TIME PARTIAL: CPT

## 2020-10-23 PROCEDURE — 6360000002 HC RX W HCPCS: Performed by: NURSE PRACTITIONER

## 2020-10-23 PROCEDURE — 99233 SBSQ HOSP IP/OBS HIGH 50: CPT | Performed by: INTERNAL MEDICINE

## 2020-10-23 PROCEDURE — P9047 ALBUMIN (HUMAN), 25%, 50ML: HCPCS | Performed by: INTERNAL MEDICINE

## 2020-10-23 PROCEDURE — 85027 COMPLETE CBC AUTOMATED: CPT

## 2020-10-23 PROCEDURE — 80053 COMPREHEN METABOLIC PANEL: CPT

## 2020-10-23 PROCEDURE — 82962 GLUCOSE BLOOD TEST: CPT

## 2020-10-23 PROCEDURE — 90945 DIALYSIS ONE EVALUATION: CPT

## 2020-10-23 PROCEDURE — 6370000000 HC RX 637 (ALT 250 FOR IP): Performed by: SPECIALIST

## 2020-10-23 PROCEDURE — 1200000000 HC SEMI PRIVATE

## 2020-10-23 PROCEDURE — 36415 COLL VENOUS BLD VENIPUNCTURE: CPT

## 2020-10-23 RX ORDER — ALBUMIN (HUMAN) 12.5 G/50ML
25 SOLUTION INTRAVENOUS ONCE
Status: COMPLETED | OUTPATIENT
Start: 2020-10-23 | End: 2020-10-23

## 2020-10-23 RX ADMIN — INSULIN LISPRO 5 UNITS: 100 INJECTION, SOLUTION INTRAVENOUS; SUBCUTANEOUS at 17:31

## 2020-10-23 RX ADMIN — Medication 1 CAPSULE: at 22:49

## 2020-10-23 RX ADMIN — HEPARIN SODIUM AND DEXTROSE 16.98 UNITS/KG/HR: 10000; 5 INJECTION INTRAVENOUS at 09:18

## 2020-10-23 RX ADMIN — HEPARIN SODIUM AND DEXTROSE 13.98 UNITS/KG/HR: 10000; 5 INJECTION INTRAVENOUS at 17:47

## 2020-10-23 RX ADMIN — ALBUMIN (HUMAN) 25 G: 0.25 INJECTION, SOLUTION INTRAVENOUS at 16:49

## 2020-10-23 RX ADMIN — EPOETIN ALFA-EPBX 8000 UNITS: 4000 INJECTION, SOLUTION INTRAVENOUS; SUBCUTANEOUS at 15:24

## 2020-10-23 ASSESSMENT — PAIN SCALES - GENERAL: PAINLEVEL_OUTOF10: 0

## 2020-10-23 NOTE — CARE COORDINATION
10/23/2020 1108 CM note:NEGATIVE COVID TESTING 10/20/20. YULISSA was positive for vegetation and plan is to transfer to [facility].  [facility] bed availability checked this am and at this time no available bed for transfer.  Mia ALVAREZ

## 2020-10-23 NOTE — PROGRESS NOTES
Department of Internal Medicine  Nephrology Progress Note    Events reviewed. Pending transfer to [facility]. MRI of the brain with metabolic/toxic encephalopathy. Much more alert today. SUBJECTIVE:  We are following [address] [place] [address] for ESRD on peritoneal dialysis. She is alert and oriented today. She reports being scared.       Physical Exam:    VITALS:  BP (!) 176/92   Pulse 95   Temp 97.7 °F (36.5 °C) (Oral)   Resp 16   Ht 5' 4\" (1.626 m)   Wt 143 lb 1.3 oz (64.9 kg)   SpO2 99%   BMI 24.56 kg/m²   24HR INTAKE/OUTPUT:      Intake/Output Summary (Last 24 hours) at 10/23/2020 1148  Last data filed at 10/23/2020 1052  Gross per 24 hour   Intake 0 ml   Output --   Net 0 ml     Constitutional: alert and oriented  HEENT:  Normocephalic, PERRL  Respiratory:  CTA bilaterally  Cardiovascular/Edema:  RRR, S1/S2  Gastrointestinal:  Soft, PD catheter exit site clean   Neurologic:  nonfocal  Skin:  Warm, dry, no lesions  Other:  No edema; L arm cast    Scheduled Meds:   vancomycin  750 mg Intravenous Q72H    insulin lispro  5 Units Subcutaneous TID WC    [Held by provider] insulin glargine  12 Units Subcutaneous QAM    [Held by provider] lactobacillus  1 capsule Oral Q12H    [Held by provider] metoprolol  50 mg Oral BID    sodium chloride  1,000 mL Intravenous Once    sodium chloride  1,000 mL Intravenous Once    lidocaine  5 mL Intradermal Once    heparin flush  3 mL Intravenous 2 times per day    lidocaine  5 mL Intradermal Once    heparin flush  1 mL Intravenous 2 times per day    insulin lispro  0-6 Units Subcutaneous TID WC    insulin lispro  0-3 Units Subcutaneous Nightly    epoetin nena-epbx  8,000 Units Subcutaneous Once per day on Mon Wed Fri    [Held by provider] insulin NPH  5 Units Subcutaneous Nightly    sodium chloride flush  10 mL Intravenous 2 times per day    [Held by provider] atorvastatin  40 mg Oral Nightly    [Held by provider] dilTIAZem  240 mg Oral Daily    [Held by provider] levothyroxine  50 mcg Oral Daily    [Held by provider] metOLazone  5 mg Oral Daily    sodium chloride flush  10 mL Intravenous 2 times per day     Continuous Infusions:   heparin (PORCINE) Infusion 16.98 Units/kg/hr (10/23/20 0918)    dextrose Stopped (10/23/20 0138)     PRN Meds:.metoprolol, ondansetron, heparin (porcine), heparin (porcine), sodium chloride flush, heparin flush, sodium chloride flush, heparin flush, acetaminophen, cloNIDine, acetaminophen **OR** acetaminophen, magnesium hydroxide, glucose, dextrose, glucagon (rDNA), dextrose      DATA:    CBC:   Lab Results   Component Value Date    WBC 7.3 10/23/2020    RBC 3.14 10/23/2020    HGB 9.9 10/23/2020    HCT 30.1 10/23/2020    MCV 95.9 10/23/2020    MCH 31.5 10/23/2020    MCHC 32.9 10/23/2020    RDW 17.3 10/23/2020     10/23/2020    MPV 10.4 10/23/2020     CMP:    Lab Results   Component Value Date     10/23/2020    K 3.9 10/23/2020    K 3.7 10/11/2020    CL 89 10/23/2020    CO2 22 10/23/2020    BUN 46 10/23/2020    CREATININE 7.4 10/23/2020    GFRAA 8 10/23/2020    LABGLOM 8 10/23/2020    GLUCOSE 222 10/23/2020    GLUCOSE 130 05/18/2012    PROT 5.7 10/23/2020    LABALBU 3.4 10/23/2020    LABALBU 4.1 05/18/2012    CALCIUM 8.8 10/23/2020    BILITOT 0.2 10/23/2020    ALKPHOS 146 10/23/2020    AST 22 10/23/2020    ALT 41 10/23/2020     Magnesium:    Lab Results   Component Value Date    MG 2.3 10/14/2020     Phosphorus:    Lab Results   Component Value Date    PHOS 8.9 10/14/2020     Radiology Review:      Chest x-ray October 8, 2020   No evidence of pneumonia or pleural effusion.               BRIEF SUMMARY OF INITIAL CONSULT:    Briefly, Caity Martinez is a 31year-old female with history of ESRD on peritoneal dialysis ([facility]), poorly controlled type I DM with multiple admissions for DKA, gastroparesis, HTN  UTI, who was admitted October 8, 2020 after she presented to the ER reporting feeling unwell, having lower back pain and vomiting.

## 2020-10-23 NOTE — PROGRESS NOTES
Pharmacy Consultation Note  (Antibiotic Dosing and Monitoring)    Initial consult date: 10/10/20  Consulting physician: Dr. David Mayer  Drug(s): Vancomycin  Indication: Positive blood culture    Ht Readings from Last 1 Encounters:   10/22/20 5' 4\" (1.626 m)     Wt Readings from Last 1 Encounters:   10/22/20 143 lb 1.3 oz (64.9 kg)         Age/  Gender IBW DW  Allergy Information   31 y.o.     female 54.7 kg 58.1 kg  Toradol [ketorolac tromethamine]                 Date  WBC CCPD Drug/Dose Time   Given Level(s)   (Time) Comments   10/10  (#1) 6.8 QHS  Vancomycin 1,000 mg IV x 1 1822     10/11  (#2) 7.7 QHS No dose -- 20.0 mcg/mL @ 1324    10/12  (#3) -- QHS No dose -- 16.4 mcg/mL @ 1511    10/13  (#4) 6.8 QHS Vancomycin 500 mg IV once 1703 14.1 mcg/mL @ 1165    10/14  (#5) -- QHS No dose --     10/15  (#6) -- QHS Vancomycin 750 mg IV once 1843 10.1 mcg/mL @ 4272    10/16  (#7) 7.3 QHS No dose -- 24.6 mcg/mL @ 1400    10/17  (#8) -- QHS No dose -- 19.6 mcg/mL @ 6805    10/18  (#9) -- QHS Vancomycin 500 mg IV once 1747 17.2 mcg/mL @ 1430    10/19  (#10) 6.6 QHS Vancomycin 750 mg IV Q72H --     10/20  (#11) 10.4 NO PD Vancomycin 750 mg IV Q72H --     10/21  (#12) -- 2 exchanges Vancomycin 750 mg IV Q72H 1835 17.5 mcg/mL @ 1315    10/22  (#13) 9.0 QHS Vancomycin 750 mg IV Q72H --     10/23  (#14) 7.3 QHS Vancomycin 750 mg IV Q72H --       Estimated Creatinine Clearance: 10 mL/min (A) (based on SCr of 7.4 mg/dL (H)).   UOP over the past 24 hours:       Intake/Output Summary (Last 24 hours) at 10/23/2020 1243  Last data filed at 10/23/2020 1052  Gross per 24 hour   Intake 0 ml   Output --   Net 0 ml       Temp max: Temp (24hrs), Av.4 °F (36.3 °C), Min:96.6 °F (35.9 °C), Max:98.2 °F (36.8 °C)      Antibiotic Regimen: No other antibiotics at this time  Antibiotic Dose Date Initiated Date Stopped   Cefepime 500 mg x 1  1 g q24h 10/13  10/14 10/21     Cultures:  available culture and sensitivity results were reviewed in EPIC  Cultures sent and are pending. Culture Date Result    Urine 10/8 E faecalis   Blood #1 10/8 Methicillin-resistant CoNS   Blood #2 10/8 Methicillin-resistant CoNS   Blood #3 10/12 NGTD   Blood #4 10/13 NGTD   MRSA Nares 10/13 Not isolated   C diff 10/13 Toxin A/B not detected   Peritoneal Body Fluid Cx 10/13 NGTD   COVID-19 10/20 Not detected   Blood #5 10/21 NGTD     Assessment:  · Consulted by Dr. Rashida Lovelace to dose/monitor vancomycin  · Goal trough level:  15-20 mcg/mL  · Pt is a 31 yof admitted to the hospital for hyperglycemia. She is being initiated on vancomycin for a positive blood culture, final c/s pending. · Wilton is ESRD on PD, cycles nightly, nephro following  · 10/11: Random level @ 1324 = 20.0 mcg/mL  · 10/12: Random level @ 1511 = 16.4 mcg/mL  · 10/13: Random level @ 1548 = 14.1 mcg/mL  · 10/15: Random level @ 1655 = 10.1 mcg/mL  · 10/16: Random level @ 1400 = 24.6 mcg/mL  · 10/17: Random level @ 1655 = 19.6 mcg/mL  · 10/18: Random level @ 1430 = 17.2 mcg/mL  · 10/20: YULISSA yesterday shows vegetation vs thrombus, planning for transfer to [facility] when bed available   · 10/21: Did not cycle last night, checked random level today @ 1315 = 17.5 mcg/mL. Planning to exchange today and cycle tonight, still awaiting bed at [facility].     Plan:  · Continue Vancomycin 750 mg IV Q72H (next dose due tomorrow)  · Check levels PRN  · Follow nephro notes/PD schedule  · Pharmacist will follow and monitor/adjust dosing as necessary      Thank you for the consult,    Michael Steve, MarianneD, BCPS 10/23/2020 12:43 PM   [phone]

## 2020-10-23 NOTE — PROGRESS NOTES
[facility]  [facility]  Progress Note    NAME:Wilton Flores  1990  DATE OF SERVICE:10/23/20    Pt was seen FACE TO FACE FOR atbx    SUBJECTIVE:  Chief Complaint   Patient presents with    Hyperglycemia     blood sugar high today vomiting back pain   awake and laert nad  No f/c/n/v/d/  No abd pain   No f/c  Patient is tolerating medications. No reported adverse drug reactions. Review of systems:  As stated above in the chief complaint, otherwise negative.     Medications:  Scheduled Meds:   vancomycin  750 mg Intravenous Q72H    insulin lispro  5 Units Subcutaneous TID WC    [Held by provider] insulin glargine  12 Units Subcutaneous QAM    [Held by provider] lactobacillus  1 capsule Oral Q12H    [Held by provider] metoprolol  50 mg Oral BID    sodium chloride  1,000 mL Intravenous Once    sodium chloride  1,000 mL Intravenous Once    lidocaine  5 mL Intradermal Once    heparin flush  3 mL Intravenous 2 times per day    lidocaine  5 mL Intradermal Once    heparin flush  1 mL Intravenous 2 times per day    insulin lispro  0-6 Units Subcutaneous TID WC    insulin lispro  0-3 Units Subcutaneous Nightly    epoetin nena-epbx  8,000 Units Subcutaneous Once per day on Mon Wed Fri    [Held by provider] insulin NPH  5 Units Subcutaneous Nightly    sodium chloride flush  10 mL Intravenous 2 times per day    [Held by provider] atorvastatin  40 mg Oral Nightly    [Held by provider] dilTIAZem  240 mg Oral Daily    [Held by provider] levothyroxine  50 mcg Oral Daily    [Held by provider] metOLazone  5 mg Oral Daily    sodium chloride flush  10 mL Intravenous 2 times per day     Continuous Infusions:   heparin (PORCINE) Infusion 16.98 Units/kg/hr (10/23/20 0918)    dextrose Stopped (10/23/20 0138)     PRN Meds:metoprolol, ondansetron, heparin (porcine), heparin (porcine), sodium chloride flush, heparin flush, sodium chloride flush, heparin flush, acetaminophen, cloNIDine, acetaminophen **OR** acetaminophen, magnesium hydroxide, glucose, dextrose, glucagon (rDNA), dextrose    OBJECTIVE:  BP (!) 176/92   Pulse 95   Temp 97.7 °F (36.5 °C) (Oral)   Resp 16   Ht 5' 4\" (1.626 m)   Wt 143 lb 1.3 oz (64.9 kg)   SpO2 99%   BMI 24.56 kg/m²   Temp  Av.4 °F (36.3 °C)  Min: 96.6 °F (35.9 °C)  Max: 98.2 °F (36.8 °C)  Constitutional:  The patient is awake  Skin:    Warm and dry. HEENT:      AT/NC. Neck supple  Chest:   No use of accessory muscles to breathe. Symmetrical expansion. Cardiovascular:  S1 and S2 are rhythmic and regular. Abdomen:   Pd CATH distended nt soft  Extremities:    edema.  LEFT UE ACED  CNS    NAD follow commands  Lines: LEFT MEDIPORT   Right arm with line- no phlebitis- 10/13/2020    Radiology:  Laboratory and Tests Review:  Lab Results   Component Value Date    WBC 7.3 10/23/2020    WBC 9.0 10/22/2020    WBC 10.4 10/20/2020    HGB 9.9 (L) 10/23/2020    HCT 30.1 (L) 10/23/2020    MCV 95.9 10/23/2020     10/23/2020     No results found for: [facility]  Lab Results   Component Value Date    ALT 41 (H) 10/23/2020    AST 22 10/23/2020    ALKPHOS 146 (H) 10/23/2020    BILITOT 0.2 10/23/2020     Lab Results   Component Value Date     10/23/2020    K 3.9 10/23/2020    K 3.7 10/11/2020    CL 89 10/23/2020    CO2 22 10/23/2020    BUN 46 10/23/2020    CREATININE 7.4 10/23/2020    CREATININE 8.2 10/22/2020    CREATININE 8.8 10/21/2020    GFRAA 8 10/23/2020    LABGLOM 8 10/23/2020    GLUCOSE 222 10/23/2020    GLUCOSE 130 2012    PROT 5.7 10/23/2020    LABALBU 3.4 10/23/2020    LABALBU 4.1 2012    CALCIUM 8.8 10/23/2020    BILITOT 0.2 10/23/2020    ALKPHOS 146 10/23/2020    AST 22 10/23/2020    ALT 41 10/23/2020     Lab Results   Component Value Date    CRP 43.1 (H) 2019    CRP 0.3 10/02/2019    CRP 0.4 2017     Lab Results   Component Value Date    SEDRATE 19 10/13/2020    SEDRATE 135 (H) 2019    SEDRATE 14 2017 Microbiology:   Recent Labs     10/20/20  1415   COVID19 Not Detected     Lab Results   Component Value Date    BLOODCULT2 5 Days no growth 07/18/2020    BLOODCULT2 5 Days- no growth 02/09/2020    BLOODCULT2 5 Days- no growth 02/08/2020    ORG Staphylococcus epidermidis 10/08/2020    ORG Staphylococcus epidermidis 10/08/2020    ORG Enterococcus faecalis 10/08/2020     WOUND/ABSCESS   Date Value Ref Range Status   12/11/2014 (A)  Final    Mixed yulisa also isolated, including:  Alpha hemolytic Strep species  Corynebacteria     12/11/2014 Heavy growth  Final     MRSA Culture Only   Date Value Ref Range Status   10/13/2020 Methicillin resistant Staph aureus not isolated  Final     ASSESSMENT:  Cons bacteremia has left MediPort   Rule out line infection +YULISSA    Summary   Large irregular, 2cm x 1cm, mobile, echogenic mass attached to the right   atrial side of interatrial septum near SVC opening suggestive of   vegetation or thrombus. Large, 1cm x 1cm, irregular echogenic mass attached to the tip of the   central venous catheter suggestive of vegetation or thrombus. Normal left ventricular systolic function. Interatrial septum intact. Agitated saline injection showed no right to left shunt. Normal right ventricle size and function. There is mild tricuspid regurgitation. Posterior mitral leaflet prolapse. There is moderate tricuspid regurgitation. Normal aortic root size. No evidence of pericardial effusion. Pericardium appears normal.   Technically sub-optimal images. Compared to prior TTE from 1/13/2020. E. faecalis bacteruria   CKD PD CATH   S/p fall -  traumatic nondisplaced fracture involving the 5th    metacarpal neck     Lethargy- decreased mentation- ? Cefepime resolved    Plan:   · Continue vancomycin- next dose scheduled for 10/21/2020 plan 4- 6 weks  · Followed by pharmacy     · For transfer to [facility]- awaiting bed    Imaging and labs were reviewed per medical records.      The patient was educated about the diagnosis, prognosis, indications, risks and benefits of treatment. An opportunity to ask questions was given to the patient/ . Thank you for involving me in the care of [address] [place] [address]. Please do not hesitate to call for any questions or concerns.        Electronically signed by Torito Carpenter MD on 10/23/2020 at 11:47 AM    Phone [phone]  Fax [phone]

## 2020-10-23 NOTE — PLAN OF CARE
Problem: Pain:  Goal: Pain level will decrease  Description: Pain level will decrease  10/23/2020 0413 by Jessica Carrillo RN  Outcome: Met This Shift  10/23/2020 0413 by Jessica Carrillo RN  Outcome: Met This Shift  Goal: Control of acute pain  Description: Control of acute pain  10/23/2020 0413 by Jessica Carrillo RN  Outcome: Met This Shift  10/23/2020 0413 by Jessica Carrillo RN  Outcome: Met This Shift  Goal: Control of chronic pain  Description: Control of chronic pain  10/23/2020 0413 by Jessica Carrillo RN  Outcome: Met This Shift  10/23/2020 0413 by Jessica Carrillo RN  Outcome: Met This Shift     Problem: Falls - Risk of:  Goal: Will remain free from falls  Description: Will remain free from falls  Outcome: Met This Shift  Goal: Absence of physical injury  Description: Absence of physical injury  Outcome: Met This Shift     Problem: Serum Glucose Level - Abnormal:  Goal: Ability to maintain appropriate glucose levels will improve  Description: Ability to maintain appropriate glucose levels will improve  Outcome: Met This Shift     Problem: Skin Integrity:  Goal: Will show no infection signs and symptoms  Description: Will show no infection signs and symptoms  Outcome: Met This Shift  Goal: Absence of new skin breakdown  Description: Absence of new skin breakdown  Outcome: Met This Shift     Problem: Inadequate oral food/beverage intake (NI-2.1)  Goal: Food and/or Nutrient Delivery  Description: Individualized approach for food/nutrient provision.   10/22/2020 1636 by Oneida Sheppard RD, LD  Outcome: Met This Shift     Problem: Coping:  Goal: Ability to remain calm will improve  Description: Ability to remain calm will improve  Outcome: Met This Shift     Problem: Safety:  Goal: Ability to remain free from injury will improve  Description: Ability to remain free from injury will improve  Outcome: Met This Shift       Problem: Discharge Planning:  Goal: Discharged to appropriate level of care  Description: Discharged to appropriate level of care  Outcome: Not Met This Shift  Note: Pt.  Not discharged yet     Problem: Sensory Perception - Impaired:  Goal: Ability to maintain a stable neurologic state will improve  Description: Ability to maintain a stable neurologic state will improve  Outcome: Not Met This Shift  Note: Pt. Lethargic, not responding much

## 2020-10-23 NOTE — PROGRESS NOTES
Dr. David Magaña was notified of patient being very lethargic, not speaking, and following only few commands. Doctor came and saw patient at the bedside.     Electronically signed by Rosio Childress RN on 10/23/2020 at 4:55 AM

## 2020-10-23 NOTE — PROGRESS NOTES
[Held by provider] metoprolol  50 mg Oral BID    sodium chloride  1,000 mL Intravenous Once    sodium chloride  1,000 mL Intravenous Once    lidocaine  5 mL Intradermal Once    heparin flush  3 mL Intravenous 2 times per day    lidocaine  5 mL Intradermal Once    heparin flush  1 mL Intravenous 2 times per day    insulin lispro  0-6 Units Subcutaneous TID WC    insulin lispro  0-3 Units Subcutaneous Nightly    epoetin nena-epbx  8,000 Units Subcutaneous Once per day on Mon Wed Fri    [Held by provider] insulin NPH  5 Units Subcutaneous Nightly    sodium chloride flush  10 mL Intravenous 2 times per day    [Held by provider] atorvastatin  40 mg Oral Nightly    [Held by provider] dilTIAZem  240 mg Oral Daily    [Held by provider] levothyroxine  50 mcg Oral Daily    [Held by provider] metOLazone  5 mg Oral Daily    sodium chloride flush  10 mL Intravenous 2 times per day     metoprolol, 2.5 mg, Q6H PRN  ondansetron, 4 mg, Q6H PRN  heparin (porcine), 80 Units/kg, PRN  heparin (porcine), 40 Units/kg, PRN  sodium chloride flush, 10 mL, PRN  heparin flush, 3 mL, PRN  sodium chloride flush, 10 mL, PRN  heparin flush, 1 mL, PRN  acetaminophen, 650 mg, Q4H PRN  cloNIDine, 0.1 mg, BID PRN  acetaminophen, 650 mg, Q6H PRN    Or  acetaminophen, 650 mg, Q6H PRN  magnesium hydroxide, 30 mL, Daily PRN  glucose, 15 g, PRN  dextrose, 12.5 g, PRN  glucagon (rDNA), 1 mg, PRN  dextrose, 100 mL/hr, PRN         Objective:    BP (!) 176/92   Pulse 95   Temp 97.7 °F (36.5 °C) (Oral)   Resp 16   Ht 5' 4\" (1.626 m)   Wt 143 lb 1.3 oz (64.9 kg)   SpO2 99%   BMI 24.56 kg/m²   General Appearance: Awake and alert, however nonverbal.  Myoclonic jerking evident.   Skin: warm and dry  Head: normocephalic and atraumatic  Eyes: pupils equal, round, and reactive to light, conjunctivae normal  Neck: neck supple without mass  Pulmonary/Chest: clear to auscultation bilaterally- no wheezes, rales or rhonchi, normal air movement, no respiratory distress  Cardiovascular: normal rate, normal S1 and S2 grade 2/6 systolic murmur and no carotid bruits  Abdomen: soft, non-tender, non-distended, normal bowel sounds, no masses or organomegaly. Peritoneal dialysis catheter in place without surrounding erythema or drainage  Extremities: Left upper extremity in gutter splint. No lower extremity edema. Trace dorsalis pedis and posterior tibial pulses bilaterally. Neurologic: Nonverbal.  Will follow commands, including squeezing with hand and wiggling toes with the right upper extremity and lower extremity, but otherwise does not participate in neurologic exam.  Negative Babinski bilaterally. Recent Labs     10/21/20  2245 10/22/20  0545 10/23/20  0616    133 129*   K 5.7* 4.7 3.9   CL 96* 92* 89*   CO2 23 22 22   BUN 68* 60* 46*   CREATININE 8.8* 8.2* 7.4*   GLUCOSE 160* 240* 222*   CALCIUM 9.1 8.9 8.8       Recent Labs     10/21/20  2245 10/22/20  0545 10/23/20  0616   ALKPHOS 157* 165* 146*   PROT 6.0* 6.1* 5.7*   LABALBU 3.6 3.5 3.4*   BILITOT 0.3 0.2 0.2   AST 38* 29 22   ALT 58* 55* 41*       Recent Labs     10/20/20  1420 10/22/20  0545 10/23/20  0616   WBC 10.4 9.0 7.3   RBC 2.96* 3.17* 3.14*   HGB 9.3* 10.0* 9.9*   HCT 30.9* 30.8* 30.1*   .4* 97.2 95.9   MCH 31.4 31.5 31.5   MCHC 30.1* 32.5 32.9   RDW 21.0* 18.4* 17.3*    279 281   MPV 10.7 10.8 10.4       Radiology:   MRI BRAIN WO CONTRAST   Final Result   1. Symmetric areas of restricted diffusion in the parietal deep white matter   bilaterally, consistent with encephalopathy, most likely metabolic or toxic. Follow-up MRI of the brain is suggested. 2. Otherwise, unremarkable MRI of the brain. No evidence of acute infarct or   intracranial hemorrhage. CT HEAD WO CONTRAST   Final Result   No acute intracranial abnormality. US DUP LOWER EXTREMITIES BILATERAL VENOUS   Final Result   No evidence of DVT in either lower extremity.          XR CHEST PORTABLE   Final Result   1. No active pulmonary disease. CT HEAD WO CONTRAST   Final Result   1. No acute intracranial abnormality. XR HAND LEFT (MIN 3 VIEWS)   Final Result   Addendum 1 of 1   ADDENDUM:   Recommend clinical correlation for point tenderness involving the 5th   metacarpal neck. Final      XR KNEE LEFT (3 VIEWS)   Final Result   1. No acute abnormality involving the left knee. XR CHEST PORTABLE   Final Result   No evidence of pneumonia or pleural effusion. Assessment/Plan:  Principal Problem:    Uncontrolled type 1 diabetes mellitus with hyperglycemia, with long-term current use of insulin (HCC)  Active Problems:    ESRD on peritoneal dialysis ([facility] [address]    Severe protein-calorie malnutrition ([facility] [address]    Acute cystitis without hematuria    Hypertension    Hypothyroidism    Hyperlipidemia    Hyperglycemia    Nondisplaced fracture of neck of fifth metacarpal bone, left hand, initial encounter for closed fracture    Acute encephalopathy    Effusion of left knee    Hyperkalemia  Resolved Problems:    * No resolved hospital problems. *      1. Acute metabolic encephalopathy(resolved but now recurred)   -on admission due to prolonged hypoglycemia and hypotension  -Remains encephalopathic. Blood glucose 159 at this time. Blood pressure 130/97. Demonstrates this morning only significant for elevated creatinine which is chronic in her end-stage renal disease.  -She did have a CT of the head on 10/20 which did not show any acute process. Obtain MRI brain to assess for embolic stroke as she is only following commands with the right side of her body today.  -Hold oral medications, strict n.p.o. for now  -Gabapentin, oxycodone, Phenergan, Ativan discontinued. These are not likely the etiology of her encephalopathy, but we will hold medications that may contribute  -She has had multiple episodes of hypoglycemia.   Last night blood sugar was less than 40, improved to 87 treatment, and it was 81 at 1727 this evening. There is concern that she is having episodes of undetected hypoglycemia. We will check blood glucose every 1 hour for the next 3 hours.  -As needed IV metoprolol tartrate for systolic blood pressure greater than 180 or heart rate greater than 110  -improved today; MRI c/w metabolic or toxic encephalopathy. 2.  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  -On vancomycin and cefepime per infectious disease but the latter stopped 10/21. Ammonia level ordered as were ABGs the latter consistent with metabolic acidosis. -YULISSA from 10/19 showed large vegetation 2 x 1 cm in the right atrium as well as 1 x1 cm vegetation. On 10/20,  made call through [employer] and spoke to triage cardiologist at the [facility] who accepted patient. Bed availability pending. Per transfer center still no bed available today,.  -in review of patient's chart was able to see that mediport was placed April 2018 by IR. 3.  Uncontrolled type 1 diabetes mellitus   -Blood sugars remain extremely labile  -Ultimately she will need continuous glucose monitor and insulin pump. She is following with endocrinology as an outpatient    4. Hyperkalemia   -Continue scheduled peritoneal dialysis and as needed insulin for treatment of acute hyperkalemia  --Currently has peritoneal dialysis treatment running    5. UTI   -Treated with fosfomycin on 10/9    6. Left fifth metacarpal fracture  -Splinted by orthopedic surgery  -Nonweightbearing per orthopedic surgery recommendation    7. Chronic low back pain   -Heating pad and Norco as needed    8. Left knee traumatic effusion   -wrapped per orthopedic surgery    9. ESRD on peritoneal dialysis  -Continue nightly peritoneal dialysis regimen per nephrology    10. Hypothyroidism  -Continue levothyroxine    11.   Hyperlipidemia  -Continue atorvastatin    Disposition: to [facility] when bed available. Covid test from 10/20 neg. Case discussed with Dr. Vishnu Montgomery of ID earlier today. NOTE: This report was transcribed using voice recognition software. Every effort was made to ensure accuracy; however, inadvertent computerized transcription errors may be present.      Electronically signed by Hilda Graf MD on 10/23/2020 at 2:14 PM

## 2020-10-23 NOTE — PROGRESS NOTES
[address] [place], [place] [address]   Progress Note    Admitting Date and Time: 10/8/2020  3:15 PM  Admit Dx: Type I diabetes mellitus with hyperosmolar coma ([facility] [address] [E10.69, E10.65]    Subjective/interval history:    10/18: Patient's left hand pain has improved. She does complain of some nausea. Another episode of hypoglycemia this morning. Scheduled to have YULISSA tomorrow. 10/19: Patient NPO for YULISSA today and states she is hungry as was unable to eat yesterday because of N/V    10/20: Patient seen earlier this morning and was made aware of the positive YULISSA results and the need for transfer to the [facility] clinic. She was agreeable patient had state that she  was not feeling well this morning. 10/21: Patient more obtunded today. Nephrology nurse practitioner at bedside concerned about her hyperkalemia. She has been ordered an amp of bicarbonate amp of D50 in order to do an exchange for her peritoneal dialysis right now. She did not get her evening peritoneal dialysis per nephrology over concerns events that had transpired yesterday. ID nurse practitioner was by earlier and discontinued cefepime thinking that may be contributing to her altered mental state    10/22: Patient seen and examined with RN at bedside. She still very encephalopathic. She is awake, however nonverbal.  She will follow some commands, including squeezing fingers with right hand and moving her right toes. She exhibits myoclonic jerks as well.        vancomycin  750 mg Intravenous Q72H    insulin lispro  5 Units Subcutaneous TID WC    [Held by provider] insulin glargine  12 Units Subcutaneous QAM    lactobacillus  1 capsule Oral Q12H    metoprolol  50 mg Oral BID    sodium chloride  1,000 mL Intravenous Once    sodium chloride  1,000 mL Intravenous Once    lidocaine  5 mL Intradermal Once    heparin flush  3 mL Intravenous 2 times per day    lidocaine  5 mL Intradermal Once    heparin flush  1 mL Intravenous 2 times per day    insulin lispro  0-6 Units Subcutaneous TID     insulin lispro  0-3 Units Subcutaneous Nightly    epoetin nena-epbx  8,000 Units Subcutaneous Once per day on Mon Wed Fri    [Held by provider] insulin NPH  5 Units Subcutaneous Nightly    sodium chloride flush  10 mL Intravenous 2 times per day    atorvastatin  40 mg Oral Nightly    dilTIAZem  240 mg Oral Daily    gabapentin  100 mg Oral TID    levothyroxine  50 mcg Oral Daily    metOLazone  5 mg Oral Daily    sodium chloride flush  10 mL Intravenous 2 times per day     heparin (porcine), 80 Units/kg, PRN  heparin (porcine), 40 Units/kg, PRN  oxyCODONE, 5 mg, Q4H PRN    Or  oxyCODONE, 10 mg, Q4H PRN  sodium chloride flush, 10 mL, PRN  heparin flush, 3 mL, PRN  sodium chloride flush, 10 mL, PRN  heparin flush, 1 mL, PRN  acetaminophen, 650 mg, Q4H PRN  cloNIDine, 0.1 mg, BID PRN  LORazepam, 0.5 mg, BID PRN  acetaminophen, 650 mg, Q6H PRN    Or  acetaminophen, 650 mg, Q6H PRN  magnesium hydroxide, 30 mL, Daily PRN  promethazine, 12.5 mg, Q6H PRN    Or  ondansetron, 4 mg, Q6H PRN  glucose, 15 g, PRN  dextrose, 12.5 g, PRN  glucagon (rDNA), 1 mg, PRN  dextrose, 100 mL/hr, PRN         Objective:    BP (!) 130/97   Pulse 88   Temp 97.5 °F (36.4 °C) (Infrared)   Resp 12   Ht 5' 4\" (1.626 m)   Wt 143 lb 1.3 oz (64.9 kg)   SpO2 97%   BMI 24.56 kg/m²   General Appearance: Awake and alert, however nonverbal.  Myoclonic jerking evident. Skin: warm and dry  Head: normocephalic and atraumatic  Eyes: pupils equal, round, and reactive to light, conjunctivae normal  Neck: neck supple without mass  Pulmonary/Chest: clear to auscultation bilaterally- no wheezes, rales or rhonchi, normal air movement, no respiratory distress  Cardiovascular: normal rate, normal S1 and S2 grade 2/6 systolic murmur and no carotid bruits  Abdomen: soft, non-tender, non-distended, normal bowel sounds, no masses or organomegaly.   Peritoneal dialysis catheter in place without surrounding erythema or drainage  Extremities: Left upper extremity in gutter splint. No lower extremity edema. Trace dorsalis pedis and posterior tibial pulses bilaterally. Neurologic: Nonverbal.  Will follow commands, including squeezing with hand and wiggling toes with the right upper extremity and lower extremity, but otherwise does not participate in neurologic exam.  Negative Babinski bilaterally. Recent Labs     10/21/20  1315 10/21/20  2245 10/22/20  0545    136 133   K 5.3* 5.7* 4.7   CL 97* 96* 92*   CO2 18* 23 22   BUN 73* 68* 60*   CREATININE 9.1* 8.8* 8.2*   GLUCOSE 168* 160* 240*   CALCIUM 8.8 9.1 8.9       Recent Labs     10/21/20  0602 10/21/20  2245 10/22/20  0545   ALKPHOS 177* 157* 165*   PROT 5.6* 6.0* 6.1*   LABALBU 3.5 3.6 3.5   BILITOT 0.3 0.3 0.2   AST 38* 38* 29   ALT 65* 58* 55*       Recent Labs     10/20/20  1420 10/22/20  0545   WBC 10.4 9.0   RBC 2.96* 3.17*   HGB 9.3* 10.0*   HCT 30.9* 30.8*   .4* 97.2   MCH 31.4 31.5   MCHC 30.1* 32.5   RDW 21.0* 18.4*    279   MPV 10.7 10.8       Radiology:   CT HEAD WO CONTRAST   Final Result   No acute intracranial abnormality. US DUP LOWER EXTREMITIES BILATERAL VENOUS   Final Result   No evidence of DVT in either lower extremity. XR CHEST PORTABLE   Final Result   1. No active pulmonary disease. CT HEAD WO CONTRAST   Final Result   1. No acute intracranial abnormality. XR HAND LEFT (MIN 3 VIEWS)   Final Result   Addendum 1 of 1   ADDENDUM:   Recommend clinical correlation for point tenderness involving the 5th   metacarpal neck. Final      XR KNEE LEFT (3 VIEWS)   Final Result   1. No acute abnormality involving the left knee. XR CHEST PORTABLE   Final Result   No evidence of pneumonia or pleural effusion.              Assessment/Plan:  Principal Problem:    Uncontrolled type 1 diabetes mellitus with hyperglycemia, with long-term current use of insulin (HCC)  Active Problems:    Hypertension    Severe protein-calorie malnutrition (HCC)    ESRD on peritoneal dialysis ([facility] [address]    Hypothyroidism    Hyperlipidemia    Acute cystitis without hematuria    Hyperglycemia    Nondisplaced fracture of neck of fifth metacarpal bone, left hand, initial encounter for closed fracture    Acute encephalopathy    Effusion of left knee    Hyperkalemia  Resolved Problems:    * No resolved hospital problems. *      1. Acute metabolic encephalopathy(resolved but now recurred)   -on admission due to prolonged hypoglycemia and hypotension  -Remains encephalopathic. Blood glucose 159 at this time. Blood pressure 130/97. Demonstrates this morning only significant for elevated creatinine which is chronic in her end-stage renal disease.  -She did have a CT of the head on 10/20 which did not show any acute process. Obtain MRI brain to assess for embolic stroke as she is only following commands with the right side of her body today.  -Hold oral medications, strict n.p.o. for now  -Gabapentin, oxycodone, Phenergan, Ativan discontinued. These are not likely the etiology of her encephalopathy, but we will hold medications that may contribute  -She has had multiple episodes of hypoglycemia. Last night blood sugar was less than 40, improved to 87 treatment, and it was 81 at 1727 this evening. There is concern that she is having episodes of undetected hypoglycemia. We will check blood glucose every 1 hour for the next 3 hours.  -As needed IV metoprolol tartrate for systolic blood pressure greater than 180 or heart rate greater than 110    2. 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  -On vancomycin and cefepime per infectious disease but the latter stopped today. Ammonia level ordered as were ABGs the latter consistent with metabolic acidosis.   -YULISSA from 10/19 showed large vegetation 2 x 1 cm in the right atrium as well as 1 x1 cm vegetation. On 10/20,  made call through [employer] and spoke to triage cardiologist at the [facility] who accepted patient. Bed availability pending.  -in review of patient's chart was able to see that mediport was placed April 2018 by IR. 3.  Uncontrolled type 1 diabetes mellitus   -Blood sugars remain extremely labile  -Ultimately she will need continuous glucose monitor and insulin pump. She is following with endocrinology as an outpatient    4. Hyperkalemia   -Continue scheduled peritoneal dialysis and as needed insulin for treatment of acute hyperkalemia  --Currently has peritoneal dialysis treatment running    5. UTI   -Treated with fosfomycin on 10/9    6. Left fifth metacarpal fracture  -Splinted by orthopedic surgery  -Nonweightbearing per orthopedic surgery recommendation    7. Chronic low back pain   -Heating pad and Norco as needed    8. Left knee traumatic effusion   -wrapped per orthopedic surgery    9. ESRD on peritoneal dialysis  -Continue nightly peritoneal dialysis regimen per nephrology    10. Hypothyroidism  -Continue levothyroxine    11. Hyperlipidemia  -Continue atorvastatin    Disposition: to [facility] when bed available. NOTE: This report was transcribed using voice recognition software. Every effort was made to ensure accuracy; however, inadvertent computerized transcription errors may be present.      Electronically signed by Anny Palacios DO on 10/22/2020 at 10:31 PM

## 2020-10-24 VITALS
WEIGHT: 143.08 LBS | OXYGEN SATURATION: 99 % | HEART RATE: 111 BPM | SYSTOLIC BLOOD PRESSURE: 157 MMHG | BODY MASS INDEX: 24.43 KG/M2 | TEMPERATURE: 98.1 F | RESPIRATION RATE: 18 BRPM | HEIGHT: 64 IN | DIASTOLIC BLOOD PRESSURE: 84 MMHG

## 2020-10-24 LAB
ALBUMIN SERPL-MCNC: 3.6 G/DL (ref 3.5–5.2)
ALP BLD-CCNC: 129 U/L (ref 35–104)
ALT SERPL-CCNC: 29 U/L (ref 0–32)
ANION GAP SERPL CALCULATED.3IONS-SCNC: 20 MMOL/L (ref 7–16)
APTT: 132.7 SEC (ref 24.5–35.1)
APTT: 145.7 SEC (ref 24.5–35.1)
AST SERPL-CCNC: 16 U/L (ref 0–31)
BILIRUB SERPL-MCNC: 0.3 MG/DL (ref 0–1.2)
BUN BLDV-MCNC: 47 MG/DL (ref 6–20)
CALCIUM SERPL-MCNC: 9.2 MG/DL (ref 8.6–10.2)
CHLORIDE BLD-SCNC: 93 MMOL/L (ref 98–107)
CO2: 22 MMOL/L (ref 22–29)
CREAT SERPL-MCNC: 7.7 MG/DL (ref 0.5–1)
GFR AFRICAN AMERICAN: 8
GFR NON-AFRICAN AMERICAN: 8 ML/MIN/1.73
GLUCOSE BLD-MCNC: 241 MG/DL (ref 74–99)
HCT VFR BLD CALC: 28.3 % (ref 34–48)
HEMOGLOBIN: 8.6 G/DL (ref 11.5–15.5)
MCH RBC QN AUTO: 30.1 PG (ref 26–35)
MCHC RBC AUTO-ENTMCNC: 30.4 % (ref 32–34.5)
MCV RBC AUTO: 99 FL (ref 80–99.9)
METER GLUCOSE: 333 MG/DL (ref 74–99)
METER GLUCOSE: 411 MG/DL (ref 74–99)
PDW BLD-RTO: 17.4 FL (ref 11.5–15)
PLATELET # BLD: 235 E9/L (ref 130–450)
PMV BLD AUTO: 10.6 FL (ref 7–12)
POTASSIUM SERPL-SCNC: 4.1 MMOL/L (ref 3.5–5)
RBC # BLD: 2.86 E12/L (ref 3.5–5.5)
REASON FOR REJECTION: NORMAL
REJECTED TEST: NORMAL
SODIUM BLD-SCNC: 135 MMOL/L (ref 132–146)
TOTAL PROTEIN: 5.8 G/DL (ref 6.4–8.3)
WBC # BLD: 5.3 E9/L (ref 4.5–11.5)

## 2020-10-24 PROCEDURE — 36592 COLLECT BLOOD FROM PICC: CPT

## 2020-10-24 PROCEDURE — 2580000003 HC RX 258: Performed by: INTERNAL MEDICINE

## 2020-10-24 PROCEDURE — 85027 COMPLETE CBC AUTOMATED: CPT

## 2020-10-24 PROCEDURE — 6370000000 HC RX 637 (ALT 250 FOR IP): Performed by: INTERNAL MEDICINE

## 2020-10-24 PROCEDURE — 80053 COMPREHEN METABOLIC PANEL: CPT

## 2020-10-24 PROCEDURE — 6360000002 HC RX W HCPCS: Performed by: INTERNAL MEDICINE

## 2020-10-24 PROCEDURE — 6370000000 HC RX 637 (ALT 250 FOR IP): Performed by: SPECIALIST

## 2020-10-24 PROCEDURE — 99239 HOSP IP/OBS DSCHRG MGMT >30: CPT | Performed by: INTERNAL MEDICINE

## 2020-10-24 PROCEDURE — 90945 DIALYSIS ONE EVALUATION: CPT

## 2020-10-24 PROCEDURE — 85730 THROMBOPLASTIN TIME PARTIAL: CPT

## 2020-10-24 PROCEDURE — 36415 COLL VENOUS BLD VENIPUNCTURE: CPT

## 2020-10-24 PROCEDURE — 82962 GLUCOSE BLOOD TEST: CPT

## 2020-10-24 PROCEDURE — 2500000003 HC RX 250 WO HCPCS: Performed by: INTERNAL MEDICINE

## 2020-10-24 RX ORDER — LACTOBACILLUS RHAMNOSUS GG 10B CELL
1 CAPSULE ORAL EVERY 12 HOURS
DISCHARGE
Start: 2020-10-24 | End: 2020-11-20

## 2020-10-24 RX ORDER — HEPARIN SODIUM 10000 [USP'U]/100ML
INJECTION, SOLUTION INTRAVENOUS
Refills: 0 | DISCHARGE
Start: 2020-10-24 | End: 2020-11-20

## 2020-10-24 RX ORDER — INSULIN GLARGINE 100 [IU]/ML
12 INJECTION, SOLUTION SUBCUTANEOUS EVERY MORNING
Qty: 1 VIAL | Refills: 3 | Status: ON HOLD | DISCHARGE
Start: 2020-10-25 | End: 2020-11-11 | Stop reason: HOSPADM

## 2020-10-24 RX ORDER — ALBUMIN (HUMAN) 12.5 G/50ML
25 SOLUTION INTRAVENOUS ONCE
Status: DISCONTINUED | OUTPATIENT
Start: 2020-10-24 | End: 2020-10-24 | Stop reason: HOSPADM

## 2020-10-24 RX ORDER — LORAZEPAM 0.5 MG/1
0.5 TABLET ORAL 3 TIMES DAILY PRN
Status: DISCONTINUED | OUTPATIENT
Start: 2020-10-24 | End: 2020-10-24 | Stop reason: HOSPADM

## 2020-10-24 RX ADMIN — LEVOTHYROXINE SODIUM 50 MCG: 50 TABLET ORAL at 05:36

## 2020-10-24 RX ADMIN — LORAZEPAM 0.5 MG: 0.5 TABLET ORAL at 12:03

## 2020-10-24 RX ADMIN — METOPROLOL TARTRATE 2.5 MG: 1 INJECTION, SOLUTION INTRAVENOUS at 03:53

## 2020-10-24 RX ADMIN — Medication 1 CAPSULE: at 09:26

## 2020-10-24 RX ADMIN — DILTIAZEM HYDROCHLORIDE 240 MG: 240 CAPSULE, COATED, EXTENDED RELEASE ORAL at 09:26

## 2020-10-24 RX ADMIN — INSULIN GLARGINE 12 UNITS: 100 INJECTION, SOLUTION SUBCUTANEOUS at 09:28

## 2020-10-24 RX ADMIN — HEPARIN SODIUM AND DEXTROSE 10.98 UNITS/KG/HR: 10000; 5 INJECTION INTRAVENOUS at 02:22

## 2020-10-24 RX ADMIN — Medication 10 ML: at 09:31

## 2020-10-24 RX ADMIN — INSULIN LISPRO 5 UNITS: 100 INJECTION, SOLUTION INTRAVENOUS; SUBCUTANEOUS at 09:29

## 2020-10-24 ASSESSMENT — PAIN SCALES - GENERAL: PAINLEVEL_OUTOF10: 0

## 2020-10-24 NOTE — PROGRESS NOTES
ccpd initiated aseptically per p/p with 2.5% and 1.5% peritoneal dialysis solution. Pt is more awake this evening compared to this morning. Pt has no complaints at this time.

## 2020-10-24 NOTE — PROGRESS NOTES
Transport here to take pt to [facility]. Pt sent with all belongings and on heparin drip. Transport will be updated with new APTT for settings.  Electronically signed by Enrique Ruiz RN on 10/24/2020 at 12:23 PM

## 2020-10-24 NOTE — PROGRESS NOTES
Nurse to nurse given to Sussy Macielmike Sam at [facility].  Electronically signed by Lizzie Tate RN on 10/24/2020 at 12:22 PM

## 2020-10-24 NOTE — PLAN OF CARE

## 2020-10-24 NOTE — DISCHARGE SUMMARY
[facility] Physician Discharge Summary       Fatomuata Santana MD  [address] [facility] [address] 2051 [facility]  [phone]    Schedule an appointment as soon as possible for a visit  after discharge from [facility] [place] [address]  [phone]    Schedule an appointment as soon as possible for a visit  after discharge from [facility] KRISTINA WHITAKER MD  [address] [facility]  [address] [facility] 710 [facility]  [phone]    Schedule an appointment as soon as possible for a visit  after discharge from [address] [place] [address] level: As tolerated    Diet: DIET GENERAL; Carb Control: 4 carb choices (60 gms)/meal; Daily Fluid Restriction: 1500 ml    Labs:CMP and CBC daily    Condition at discharge: Stable     Dispo:[facility]      Patient ID:  Julio Jesus  [id number]  31 y.o.  1990    Admit date: 10/8/2020    Discharge date and time:  10/24/2020  11:41 AM    Admission Diagnoses: Principal Problem:    Uncontrolled type 1 diabetes mellitus with hyperglycemia, with long-term current use of insulin ([address]  Active Problems:    ESRD on peritoneal dialysis ([address]    Severe protein-calorie malnutrition ([address]    Acute cystitis without hematuria    Hypertension    Hypothyroidism    Hyperlipidemia    Hyperglycemia    Nondisplaced fracture of neck of fifth metacarpal bone, left hand, initial encounter for closed fracture    Acute encephalopathy    Effusion of left knee    Hyperkalemia    Acute bacterial endocarditis    Acute metabolic encephalopathy    Gram-positive cocci bacteremia    Urinary tract infection without hematuria    Chronic right-sided low back pain  Resolved Problems:    * No resolved hospital problems.  *      Discharge Diagnoses: Principal Problem:    Uncontrolled type 1 diabetes mellitus with hyperglycemia, with long-term current use of insulin (HCC)  Active Problems:    ESRD on peritoneal dialysis ([address]    Severe protein-calorie malnutrition ([address]    Acute cystitis without hematuria    Hypertension    Hypothyroidism    Hyperlipidemia    Hyperglycemia    Nondisplaced fracture of neck of fifth metacarpal bone, left hand, initial encounter for closed fracture    Acute encephalopathy    Effusion of left knee    Hyperkalemia    Acute bacterial endocarditis    Acute metabolic encephalopathy    Gram-positive cocci bacteremia    Urinary tract infection without hematuria    Chronic right-sided low back pain  Resolved Problems:    * No resolved hospital problems. *      Consults:  IP CONSULT TO NEPHROLOGY  IP CONSULT TO PHARMACY  IP CONSULT TO INFECTIOUS DISEASES  IP CONSULT TO ORTHOPEDIC SURGERY  IP CONSULT TO CRITICAL CARE  IP CONSULT TO CARDIOLOGY  IP CONSULT TO PHARMACY    Procedures: Power midline right brachial vein 10/13; YULISSA 10/19    History of Present Illness:    Informant(s) for H&P:Pt and chart   Vanessa Rodriguez is a 31 y.o. female with PMH of (ESRD on peritoneal dialysis, IDDM) presented to the ER in [address] [place] [address] with 1 month history of not feeling well, got worse the last two days, has been vomiting few times, also low back pain. Patient has been compliant on her insulin but her glucose always high.   + ARIAS. · Denied any fever, diarrhea, abdominal pain, blood in stool or black stool. · Denied any chest pain or SOB, no recent lightheadedness or syncope     Hospital Course: 31 female admitted as per above details. See outline below for additional details and issues addressed this admission:           1. Acute metabolic encephalopathy(resolved)  -on admission due to prolonged hypoglycemia and hypotension this resolved but then recurred after YULISSA done 10/19. She had few episodes of seizure like activity and myoclonic jerking.    -She did have a CT of the head on 10/20 which did not show any acute process. Patient then remained encephalopathic for the subsequent 2 days and was sent for an MRI of the brain morning of 10/23.   This showed some chronic findings consistent with encephalopathy. She slowly returned to her baseline and actually was up eating lunch yesterday for the first time in days. She unable to recall what had transpired during the time after her YULISSA until yesterday.  -Gabapentin, oxycodone, Phenergan, Ativan were discontinued but the latter restarted today because of patient's extreme anxiety with upcoming transfer.   -Second episode of encephalopathy was attributed to the cumulative effects of cefepime and it was discontinued 10/21 but her last dose was actually given on the 20th     2. Sepsis due to staph epidermidis bacteremia now with confirmed endocarditis (vs possible intracardiac thrombi)  -On vancomycin and cefepime per infectious disease but the latter stopped 10/21(last dose was 10/20). - Ammonia level 52 on 10/21 and ABG which was consistent with metabolic acidosis. -YULISSA from 10/19 showed large vegetation 2 x 1 cm in the right atrium as well as 1 x1 cm vegetation. On 10/20,  made call through [employer] and spoke to triage cardiologist at the [facility] who accepted patient. Bed availability was pending until this morning when [facility] called with bed.  -Of note, patient started on Heparin drip as these vegetations may instead be thrombi according to cardiologist who did YULISSA.   -in review of patient's chart was able to see that mediport was placed April 2018 by IR.     3. Uncontrolled type 1 diabetes mellitus   -Blood sugars remain extremely labile  -Ultimately she will need continuous glucose monitor and insulin pump. She is following with endocrinology as an outpatient     4. Hyperkalemia (resolved)  -Continue scheduled peritoneal dialysis and as needed insulin for treatment of acute hyperkalemia  --Currently has peritoneal dialysis treatment running     5. Enterococcus faecalis UTI   -Treated with fosfomycin on 10/9     6.   Left fifth metacarpal fracture  -Splinted by orthopedic surgery  -Nonweightbearing per orthopedic surgery recommendation     7. Chronic low back pain   -Heating pad and Norco as needed     8. Left knee traumatic effusion   -wrapped per orthopedic surgery     9. ESRD on peritoneal dialysis  -Continue nightly peritoneal dialysis regimen per nephrology     10. Hypothyroidism  -Continue levothyroxine     11. Hyperlipidemia  -Continue atorvastatin     Disposition: to [facility] today as bed available. Covid test from 10/20 neg. Discharge Exam:  Vitals:    10/23/20 2245 10/24/20 0345 10/24/20 0530 10/24/20 0815   BP: (!) 177/92 (!) 182/108 (!) 172/98 (!) 157/84   Pulse: 109 116  111   Resp:  20 18 18   Temp: 97.9 °F (36.6 °C) 98 °F (36.7 °C)  98.1 °F (36.7 °C)   TempSrc:  Oral  Oral   SpO2: 100% 98%  99%   Weight:       Height:         .General Appearance: Awake and alert; she is tearful as she is anxious about transfer to [facility] and being far away from her mother. Myoclonic jerking no longer present. Skin: warm and dry  Head: normocephalic and atraumatic  Eyes: pupils equal, round, and reactive to light, conjunctivae normal  Neck: neck supple without mass  Pulmonary/Chest: clear to auscultation bilaterally- no wheezes, rales or rhonchi, normal air movement, no respiratory distress  Cardiovascular: normal rate, normal S1 and S2 grade 2/6 systolic murmur and no carotid bruits  Abdomen: soft, non-tender, non-distended, normal bowel sounds, no masses or organomegaly. Peritoneal dialysis catheter in place without surrounding erythema or drainage  Extremities: Left upper extremity in gutter splint. No lower extremity edema. Trace dorsalis pedis and posterior tibial pulses bilaterally. Neurologic: awake and alert with normal speech. Back to her neurologic baseline.       LABS:  Recent Labs     10/23/20  0616 10/23/20  1833 10/24/20  0545   * 129* 135   K 3.9 4.3 4.1   CL 89* 88* 93*   CO2 22 21* 22   BUN 46* 49* 47*   CREATININE 7.4* 7.9* 7.7*   GLUCOSE 222* 436* 241* CALCIUM 8.8 8.9 9.2       Recent Labs     10/22/20  0545 10/23/20  0616 10/24/20  0620   WBC 9.0 7.3 5.3   RBC 3.17* 3.14* 2.86*   HGB 10.0* 9.9* 8.6*   HCT 30.8* 30.1* 28.3*   MCV 97.2 95.9 99.0   MCH 31.5 31.5 30.1   MCHC 32.5 32.9 30.4*   RDW 18.4* 17.3* 17.4*    281 235   MPV 10.8 10.4 10.6           Imaging:        Radiology:   MRI BRAIN WO CONTRAST   Final Result   1. Symmetric areas of restricted diffusion in the parietal deep white matter   bilaterally, consistent with encephalopathy, most likely metabolic or toxic. Follow-up MRI of the brain is suggested. 2. Otherwise, unremarkable MRI of the brain. No evidence of acute infarct or   intracranial hemorrhage.           CT HEAD WO CONTRAST   Final Result   No acute intracranial abnormality.           US DUP LOWER EXTREMITIES BILATERAL VENOUS   Final Result   No evidence of DVT in either lower extremity.           XR CHEST PORTABLE   Final Result   1. No active pulmonary disease.           CT HEAD WO CONTRAST   Final Result   1. No acute intracranial abnormality.           XR HAND LEFT (MIN 3 VIEWS)   Final Result   Addendum 1 of 1   ADDENDUM:   Recommend clinical correlation for point tenderness involving the 5th   metacarpal neck.           Final       XR KNEE LEFT (3 VIEWS)   Final Result   1. No acute abnormality involving the left knee.           XR CHEST PORTABLE   Final Result   No evidence of pneumonia or pleural effusion.             YULISSA procedure:Transesophageal Echo (YULISSA), Color Flow Velocity Mapping.      Procedure Date  Date: 10/19/2020 Start: 02:53 PM     Study Location: Portable  Technical Quality: Adequate visualization     Indications:R/O Endocarditis.     Patient Status: Routine     Height: 64 inches Weight: 147 pounds BSA: 1.72 m^2 BMI: 25.23 kg/m^2     BP: 133/90 mmHg     YULISSA Performed By: the attending and the sonographer      Type of Anesthesia: General anesthesia      Findings      Left Ventricle   Normal left ventricular MG (66763 UT) CAPS capsule Take 50,000 Units by mouth once a week Saturday      Multiple Vitamins-Minerals (RENAPLEX-D) TABS Take 1 tablet by mouth daily      cloNIDine (CATAPRES) 0.1 MG tablet Take 0.1 mg by mouth 2 times daily as needed for High Blood Pressure      levothyroxine (SYNTHROID) 50 MCG tablet Take 1 tablet by mouth Daily  Qty: 30 tablet, Refills: 5    Associated Diagnoses: Hypothyroidism, unspecified type      atorvastatin (LIPITOR) 40 MG tablet Take 1 tablet by mouth nightly  Qty: 30 tablet, Refills: 5    Associated Diagnoses: Hyperlipidemia, unspecified hyperlipidemia type      dilTIAZem (CARDIZEM CD) 240 MG extended release capsule Take 1 capsule by mouth daily  Qty: 30 capsule, Refills: 2    Associated Diagnoses: Diabetic autonomic neuropathy associated with type 1 diabetes mellitus (HCC)      epoetin nena-epbx (RETACRIT) 4000 UNIT/ML SOLN injection Inject 2 mLs into the skin three times a week Per Nephrology  Qty: 16.5 mL, Refills: 0      LORazepam (ATIVAN) 0.5 MG tablet Take 0.5 mg by mouth 2 times daily as needed for Anxiety. Insulin Syringes, Disposable, U-100 0.3 ML MISC To use daily  Qty: 100 each, Refills: 2      blood glucose test strips (ASCENSIA AUTODISC VI;ONE TOUCH ULTRA TEST VI) strip Indications: 5x a day Freestyle Lite -Tests 5 times daily and as needed for low glucose. E10.10  Qty: 200 strip, Refills: 3    Associated Diagnoses: Type 1 diabetes mellitus with nephropathy (HCC)      Lancets Thin MISC Indications: cks 5xa a day cks 5xa a day  Qty: 100 each, Refills: 5    Associated Diagnoses: Type 1 diabetes mellitus with nephropathy (HCC)      glucose (GLUTOSE) 40 % GEL Take 15 g by mouth as needed  Qty: 45 g, Refills: 1      Insulin Syringe-Needle U-100 (INSULIN SYRINGE .5CC/30GX1/2\") 30G X 1/2\" 0.5 ML MISC by Does not apply route.  Indications: uses 6-7 a day         STOP taking these medications       gabapentin (NEURONTIN) 100 MG capsule Comments:   Reason for Stopping: bumetanide (BUMEX) 2 MG tablet Comments:   Reason for Stopping:         influenza virus trivalent vaccine (FLUZONE) injection Comments:   Reason for Stopping:         insulin aspart (NOVOLOG FLEXPEN) 100 UNIT/ML injection pen Comments:   Reason for Stopping:         insulin glargine (LANTUS SOLOSTAR) 100 UNIT/ML injection pen Comments:   Reason for Stopping:         Ferric Citrate (AURYXIA) 1  MG(Fe) TABS Comments:   Reason for Stopping:         metoclopramide (REGLAN) 5 MG tablet Comments:   Reason for Stopping:         lisinopril (PRINIVIL;ZESTRIL) 20 MG tablet Comments:   Reason for Stopping:         metoprolol (LOPRESSOR) 100 MG tablet Comments:   Reason for Stopping:         insulin aspart (NOVOLOG) 100 UNIT/ML injection vial Comments:   Reason for Stopping:         potassium chloride (KLOR-CON) 20 MEQ packet Comments:   Reason for Stopping:             Note that more than 30 minutes was spent in preparing discharge papers, discussing discharge with patient, medication review, etc.    NOTE: This report was transcribed using voice recognition software.  Every effort was made to ensure accuracy; however, inadvertent computerized transcription errors may be present.     Signed:  Electronically signed by Hilda Graf MD on 10/24/2020 at 11:41 AM

## 2020-10-24 NOTE — PROGRESS NOTES
Department of Internal Medicine  Nephrology Progress Note    Events reviewed. Pending transfer to [facility]. MRI of the brain with metabolic/toxic encephalopathy. Much more alert today. SUBJECTIVE:  We are following [address] [place] [address] for ESRD on peritoneal dialysis. She is alert and oriented today. She reports being scared.       Physical Exam:    VITALS:  BP (!) 157/84   Pulse 111   Temp 98.1 °F (36.7 °C) (Oral)   Resp 18   Ht 5' 4\" (1.626 m)   Wt 143 lb 1.3 oz (64.9 kg)   SpO2 99%   BMI 24.56 kg/m²   24HR INTAKE/OUTPUT:      Intake/Output Summary (Last 24 hours) at 10/24/2020 1049  Last data filed at 10/24/2020 0546  Gross per 24 hour   Intake 200 ml   Output --   Net 200 ml     Constitutional: alert and oriented  HEENT:  Normocephalic, PERRL  Respiratory:  CTA bilaterally  Cardiovascular/Edema:  RRR, S1/S2  Gastrointestinal:  Soft, PD catheter exit site clean   Neurologic:  nonfocal  Skin:  Warm, dry, no lesions  Other:  No edema; L arm cast    Scheduled Meds:   vancomycin  750 mg Intravenous Q72H    insulin lispro  5 Units Subcutaneous TID     insulin glargine  12 Units Subcutaneous QAM    lactobacillus  1 capsule Oral Q12H    [Held by provider] metoprolol  50 mg Oral BID    sodium chloride  1,000 mL Intravenous Once    sodium chloride  1,000 mL Intravenous Once    lidocaine  5 mL Intradermal Once    heparin flush  3 mL Intravenous 2 times per day    lidocaine  5 mL Intradermal Once    heparin flush  1 mL Intravenous 2 times per day    insulin lispro  0-6 Units Subcutaneous TID     insulin lispro  0-3 Units Subcutaneous Nightly    epoetin nena-epbx  8,000 Units Subcutaneous Once per day on Mon Wed Fri    [Held by provider] insulin NPH  5 Units Subcutaneous Nightly    sodium chloride flush  10 mL Intravenous 2 times per day    atorvastatin  40 mg Oral Nightly    dilTIAZem  240 mg Oral Daily    levothyroxine  50 mcg Oral Daily    [Held by provider] metOLazone  5 mg Oral Daily    sodium chloride flush  10 mL Intravenous 2 times per day     Continuous Infusions:   heparin (PORCINE) Infusion 10.98 Units/kg/hr (10/24/20 0222)    dextrose Stopped (10/23/20 0138)     PRN Meds:.metoprolol, ondansetron, heparin (porcine), heparin (porcine), sodium chloride flush, heparin flush, sodium chloride flush, heparin flush, acetaminophen, cloNIDine, acetaminophen **OR** acetaminophen, magnesium hydroxide, glucose, dextrose, glucagon (rDNA), dextrose      DATA:    CBC:   Lab Results   Component Value Date    WBC 5.3 10/24/2020    RBC 2.86 10/24/2020    HGB 8.6 10/24/2020    HCT 28.3 10/24/2020    MCV 99.0 10/24/2020    MCH 30.1 10/24/2020    MCHC 30.4 10/24/2020    RDW 17.4 10/24/2020     10/24/2020    MPV 10.6 10/24/2020     CMP:    Lab Results   Component Value Date     10/24/2020    K 4.1 10/24/2020    K 3.7 10/11/2020    CL 93 10/24/2020    CO2 22 10/24/2020    BUN 47 10/24/2020    CREATININE 7.7 10/24/2020    GFRAA 8 10/24/2020    LABGLOM 8 10/24/2020    GLUCOSE 241 10/24/2020    GLUCOSE 130 05/18/2012    PROT 5.8 10/24/2020    LABALBU 3.6 10/24/2020    LABALBU 4.1 05/18/2012    CALCIUM 9.2 10/24/2020    BILITOT 0.3 10/24/2020    ALKPHOS 129 10/24/2020    AST 16 10/24/2020    ALT 29 10/24/2020     Magnesium:    Lab Results   Component Value Date    MG 2.3 10/14/2020     Phosphorus:    Lab Results   Component Value Date    PHOS 8.9 10/14/2020     Radiology Review:      Chest x-ray October 8, 2020   No evidence of pneumonia or pleural effusion.               BRIEF SUMMARY OF INITIAL CONSULT:    Briefly, Mercy Chang is a 31year-old female with history of ESRD on peritoneal dialysis (CCPD), poorly controlled type I DM with multiple admissions for DKA, gastroparesis, HTN  UTI, who was admitted October 8, 2020 after she presented to the ER reporting feeling unwell, having lower back pain and vomiting. In the ER her glucose was 570 mg/dL. Her urine showed >20 WBCs.     Problems resolved:  · Hyperkalemia, multifactorial, 2/2 hyperglycemia, ESRD and K supplementation (orange juice). IMPRESSION/RECOMMENDATIONS:      1. ESRD on peritoneal dialysis/CCPD. · To continue with 4 exchanges all 2L of 1.5% dextose x 9 hours, last fill 2L of 2.5% (total 10 liters)    2. Acidemia, pH 4.552, with metabolic acidosis (HAGMA 2/2 uremia, ? Lactic acidosis); · No new ABG to compare however her bicarbonate levels have improved with resuming CCPD. 3. Coagulase-negative Staphylococcus bacteremia, probably source MediPort, on vancomycin and s/p cefepime (discontinued due to neuro changes) per ID; YULISSA + vegetation/thrombus    4. 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 Mediport will need to be removed. 5. Acute encephalopathy, metabolic 2/2 acidemia, ? Cefepime related; MRI of the brain 10/22 consistent with metabolic/toxic encephalopathy  6. E faecalis bacteriuria  7. HTN, on metoprolol and diltiazem  8. Type I DM, poorly controlled  9. Left fifth metacarpal fracture, status post fall  10. Left knee effusion, traumatic  11.  Anemia of CKD, on epoetin alpha 8,000 units tiw    Plan:    · Continue CCPD   · SPA 25 gm iv today and in am  · Continue metolazone 5 mg daily  · Continue diltiazem 240 mg daily  · Continue metoprolol 50 mg twice a day  · Continue low potassium diet, no orange juice for hypoglycemia  · Pending transfer to [facility]  · Stop potassium restriction in diet       Electronically signed by Micha Arguello MD on 10/24/2020 at 10:49 AM  Agree with above A/P  Micha Arguello MD

## 2020-10-24 NOTE — DISCHARGE INSTR - COC
Continuity of Care Form    Patient Name: Ghada Floyd   :  1990  MRN:  [id number]    Admit date:  10/8/2020  Discharge date: 10/24/2020    Code Status Order: Full Code   Advance Directives:   [facility] Documentation     Date/Time Healthcare Directive Type of Healthcare Directive Copy in [facility] [address] Agent's Name Healthcare Agent's Phone Number    10/08/20 2009  No, patient does not have an advance directive for healthcare treatment -- -- -- -- --          Admitting Physician:  Macy Walters MD  PCP: Radha Monk MD    [facility]134-[facility]  Discharging Unit Phone Number: ***    Emergency Contact:   Extended Emergency Contact Information  Primary Emergency Contact: [address] [place] [address] Phone: [phone]  Mobile Phone: [phone]  Relation: Parent   needed?  No    Past Surgical History:  Past Surgical History:   Procedure Laterality Date    BACK SURGERY      abscess     SECTION      x2    CHOLECYSTECTOMY, LAPAROSCOPIC N/A 2019    CHOLECYSTECTOMY LAPAROSCOPIC performed by Jp Hardin MD at [place] [address] N/A 2018    COLONOSCOPY WITH BIOPSY performed by Driss Velasquez MD at [address] [place] [address] COLONOSCOPY N/A 2018    COLONOSCOPY WITH BIOPSY performed by Prabhakar Garcia MD at [address] [place] [address] ECHO COMPL W [address]  2012    EF 57%    ECHO COMPL W DOP COLOR FLOW  6/10/2013         LAPAROSCOPY INSERTION PERITONEAL CATHETER N/A 2020    LAPAROSCOPIC INSERTION PERITONEAL DIALYSIS CATHETER performed by Delray Dakin, MD at [facility] ESOPHAGOGASTRODUODENOSCOPY TRANSORAL DIAGNOSTIC N/A 2018    EGD ESOPHAGOGASTRODUODENOSCOPY performed by Driss Velasquez MD at [address] [place] [address] TRANSESOPHAGEAL ECHOCARDIOGRAM N/A 10/19/2020    TRANSESOPHAGEAL ECHOCARDIOGRAM WITH BUBBLE STUDY performed by Wayne Sanchez MD at [facility] Isolation          No Isolation        Patient Infection Status     Infection Onset Added Last Indicated Last Indicated By Review Planned Expiration Resolved Resolved By    None active    Resolved    COVID-19 Rule Out 10/20/20 10/20/20 10/20/20 COVID-19 (Ordered)   10/20/20 Rule-Out Test Resulted    C-diff Rule Out 10/13/20 10/13/20 10/13/20 CLOSTRIDIUM DIFFICILE EIA (Ordered)   10/14/20 Rule-Out Test Resulted    COVID-19 Rule Out 20 COVID-19 (Ordered)   20     VRE 20 Culture, Urine   10/22/20 Malathi Bowie RN    Enterococcus faecium Urine 20  Cleared urine 10/8/20    C-diff Rule Out 20 C. difficile toxin Molecular (Ordered)   20 Rule-Out Test Resulted    COVID-19 Rule Out 20 Covid-19 Ambulatory (Ordered)   20 Rule-Out Test Resulted    MRSA 20 BODY FLUID CULTURE   20 Ml Da Silva RN    MRSA 02/08/20 02/10/20 02/08/20 MRSA SCREENING CULTURE ONLY   20 Ml Da Silva, KIM    ESBL (Extended Spectrum Beta Lactamase)  16 Ml Ridsamir, RN   16 Malathi Bowie RN    ESBL+ E Coli urine 16          Nurse Assessment:  Last Vital Signs: BP (!) 157/84   Pulse 111   Temp 98.1 °F (36.7 °C) (Oral)   Resp 18   Ht 5' 4\" (1.626 m)   Wt 143 lb 1.3 oz (64.9 kg)   SpO2 99%   BMI 24.56 kg/m²     Last documented pain score (0-10 scale): Pain Level: 0  Last Weight:   Wt Readings from Last 1 Encounters:   10/22/20 143 lb 1.3 oz (64.9 kg)     Mental Status:  {IP PT MENTAL STATUS:09173}    IV Access:  {[facility] IV ACCESS:443042662}    Nursing Mobility/ADLs:  Walking   {CHP DME MARCUS}  Transfer  {CHP DME YKYB:486093168}  Bathing  {CHP DME ZIKV:800989684}  Dressing  {CHP DME LWAP:019199159}  Toileting  {CHP DME IILX:765306756}  Feeding  {CHP DME UVO}  Med Admin  {P DME QDZA:660469553}  Med Delivery   { [facility] MED Delivery:148870523}    Wound Care Documentation and Therapy:        Elimination:  Continence:   · Bowel: {YES / EF:65333}  · Bladder: {YES / BH:09513}  Urinary Catheter: {Urinary Catheter:414590609}   Colostomy/Ileostomy/Ileal Conduit: {YES / DL:45640}       Date of Last BM: ***    Intake/Output Summary (Last 24 hours) at 10/24/2020 1151  Last data filed at 10/24/2020 0546  Gross per 24 hour   Intake 200 ml   Output --   Net 200 ml     I/O last 3 completed shifts:   In: 200 [P.O.:200]  Out: -     Safety Concerns:     [address] Safety Concerns:012734721}    Impairments/Disabilities:      [address] Impairments/Disabilities:985917622}    Nutrition Therapy:  Current Nutrition Therapy:   [address] Diet List:285365711}    Routes of Feeding: {CHP DME Other Feedings:116523964}  Liquids: {Slp liquid thickness:68052}  Daily Fluid Restriction: {CHP DME Yes amt example:033508393}  Last Modified Barium Swallow with Video (Video Swallowing Test): {Done Not Done JNFA:222765513}    Treatments at the Time of Hospital Discharge:   Respiratory Treatments: ***  Oxygen Therapy:  {Therapy; copd oxygen:08210}  Ventilator:    { CC Vent QYVL:840616021}    Rehab Therapies: {THERAPEUTIC INTERVENTION:3490509618}  Weight Bearing Status/Restrictions: [address]  Weight Bearin}  Other Medical Equipment (for information only, NOT a DME order):  {EQUIPMENT:567259763}  Other Treatments: ***    Patient's personal belongings (please select all that are sent with patient):  {CHP DME Belongings:162827640}    RN SIGNATURE:  {Esignature:221585159}    CASE MANAGEMENT/SOCIAL WORK SECTION    Inpatient Status Date: ***    Readmission Risk Assessment Score:  Readmission Risk              Risk of Unplanned Readmission:        64           Discharging to Facility/ Agency   · Name:   · Address:  · Phone:  · Fax:    Dialysis Facility (if applicable)   · Name:  · Address:  · Dialysis Schedule:  · Phone:  · Fax:    / signature:

## 2020-10-24 NOTE — DISCHARGE INSTR - COC
ENDOSCOPY    TUNNELED VENOUS PORT PLACEMENT  2018    UPPER GASTROINTESTINAL ENDOSCOPY  2018    EGD BIOPSY performed by Nae Pina MD at [address] [place] [address] N/A 10/11/2019    EGD ESOPHAGOGASTRODUODENOSCOPY performed by Sergio Romero DO at [facility] ENDOSCOPY       Immunization History:   Immunization History   Administered Date(s) Administered    Influenza Virus Vaccine 10/22/2011, 10/23/2012    Influenza, Janas Rota, 6 mo and older, IM, PF (Flulaval, Fluarix) 12/15/2018    Influenza, Janas Rota, IM, PF (6 mo and older Fluzone, Flulaval, Fluarix, and 3 yrs and older Afluria) 2017, 2017    Tdap (Boostrix, Adacel) 2016, 2016, 2017       Active Problems:  Patient Active Problem List   Diagnosis Code    High-risk pregnancy O09.90    Previous stillbirth or demise, antepartum O09.299    Non compliance w medication regimen Z91.14    Dyspnea on exertion R06.00    Tobacco smoking complicating pregnancy I20.389    Drug use complicating pregnancy in third trimester O99.323    MTHFR mutation ([address] E72.12    Poorly controlled type 1 diabetes mellitus ([address] E10.65    Diabetes mellitus type 1, uncontrolled ([address] E10.65    Previous  delivery affecting pregnancy, antepartum O34.219    Oligohydramnios O41.00X0    Kidney stones N20.0    Menses painful N94.6    Generalized abdominal pain R10.84    Opioid abuse, episodic ([facility]) F11.10    Tobacco use Z72.0    Diabetic ketoacidosis without coma associated with type 1 diabetes mellitus (HCC) E10.10    Septicemia ([address] A41.9    Hypoglycemia E16.2    Hypertension I10    Type 1 diabetes mellitus with other specified complication ([facility]) K46.81    Hyponatremia E87.1    Other specified diabetes mellitus with other specified complication ([address] P33.53    First trimester pregnancy Z34.91    Acute electrocardiogram changes R94.31    Acute kidney injury superimposed on CKD (HCC) N17.9, N18.9    Diabetic gastroparesis associated with type 1 diabetes mellitus ([facility]) E10.43, K31.84    Diarrhea in adult patient R19.7    Generalized abdominal pain R10.84    Acute kidney injury superimposed on chronic kidney disease ([facility] N17.9, N18.9    Acute gastroenteritis K52.9    High anion gap metabolic acidosis U66.2    Iron deficiency anemia D50.9    Headache R51.9    Acute respiratory failure with hypoxia ([facility]) J96.01    Aspiration pneumonia of both lungs ([facility]) J69.0    Acute cholecystitis K81.0    Chest pain, unspecified R07.9    Sinus tachycardia R00.0    Orthostatic hypotension I95.1    Bladder dysfunction N31.9    C. difficile colitis A04.72    Altered mental status R41.82    Acute heart failure with preserved ejection fraction ([facility]) I50.31    Hypervolemia E87.70    Chronic kidney disease N18.9    Hypokalemia E87.6    Cellulitis of right hand L03. 113    Severe protein-calorie malnutrition ([facility] E43    Chronic renal impairment N18.9    Acute congestive heart failure ([facility]) I50.9    Hypertensive encephalopathy I67.4    Intractable vomiting with nausea R11.2    Intractable nausea and vomiting R11.2    Hypoalbuminemia E88.09    ESRD (end stage renal disease) ([facility] N18.6    Uncontrolled type 1 diabetes mellitus with hyperglycemia, with long-term current use of insulin ([facility]) E10.65    ESRD on peritoneal dialysis ([facility] N18.6, Z99.2    Hypothyroidism E03.9    Hyperlipidemia E78.5    Acute cystitis without hematuria N30.00    Hyperglycemia R73.9    Nondisplaced fracture of neck of fifth metacarpal bone, left hand, initial encounter for closed fracture S62.367A    Acute encephalopathy G93.40    Effusion of left knee M25.462    Hyperkalemia E87.5    Acute bacterial endocarditis I33.0    Acute metabolic encephalopathy I80.70    Gram-positive cocci bacteremia R78.81    Urinary tract infection without hematuria N39.0    Chronic right-sided low back pain M54.5, G89.29       Isolation/Infection: Isolation            No Isolation          Patient Infection Status       Infection Onset Added Last Indicated Last Indicated By Review Planned Expiration Resolved Resolved By    None active    Resolved    COVID-19 Rule Out 10/20/20 10/20/20 10/20/20 COVID-19 (Ordered)   10/20/20 Rule-Out Test Resulted    C-diff Rule Out 10/13/20 10/13/20 10/13/20 CLOSTRIDIUM DIFFICILE EIA (Ordered)   10/14/20 Rule-Out Test Resulted    COVID-19 Rule Out 20 COVID-19 (Ordered)   20     VRE 20 Culture, Urine   10/22/20 Andre Walker RN    Enterococcus faecium Urine 20  Cleared urine 10/8/20    C-diff Rule Out 20 C. difficile toxin Molecular (Ordered)   20 Rule-Out Test Resulted    COVID-19 Rule Out 20 Covid-19 Ambulatory (Ordered)   20 Rule-Out Test Resulted    MRSA 20 BODY FLUID CULTURE   20 Demian Dowling RN    MRSA 02/08/20 02/10/20 02/08/20 MRSA SCREENING CULTURE ONLY   20 Demian Dowling RN    ESBL (Extended Spectrum Beta Lactamase)  16 Demian Dowling RN   16 Andre Walker RN    ESBL+ E Coli urine 16            Nurse Assessment:  Last Vital Signs: BP (!) 157/84   Pulse 111   Temp 98.1 °F (36.7 °C) (Oral)   Resp 18   Ht 5' 4\" (1.626 m)   Wt 143 lb 1.3 oz (64.9 kg)   SpO2 99%   BMI 24.56 kg/m²     Last documented pain score (0-10 scale): Pain Level: 0  Last Weight:   Wt Readings from Last 1 Encounters:   10/22/20 143 lb 1.3 oz (64.9 kg)     Mental Status:  alert and oriented at times    IV Access:  Midline / has IV Heparin drip at 8.1    Nursing Mobility/ADLs:  Walking   Assisted  Transfer  Assisted  Bathing  Assisted  Dressing  Assisted  Toileting  Assisted  Feeding  Assisted  Med Admin  Assisted  Med Delivery   whole and crushed    Wound Care Documentation and Therapy:        Elimination:  Continence:   · Bowel:  Yes  · Bladder: Yes  Urinary transfer of [address]  is necessary for the continuing treatment of the diagnosis listed and that she requires higher level care.     Update Admission H&P: Changes in H&P as follows - see discharge summary    PHYSICIAN SIGNATURE:  Electronically signed by Antwon Canchola MD on 10/24/20 at 11:54 AM EDT

## 2020-10-26 LAB — BLOOD CULTURE, ROUTINE: NORMAL

## 2020-10-31 ENCOUNTER — APPOINTMENT (OUTPATIENT)
Dept: GENERAL RADIOLOGY | Age: 30
DRG: 721 | End: 2020-10-31
Payer: COMMERCIAL

## 2020-10-31 ENCOUNTER — HOSPITAL ENCOUNTER (INPATIENT)
Age: 30
LOS: 11 days | Discharge: HOME | DRG: 721 | End: 2020-11-11
Attending: EMERGENCY MEDICINE | Admitting: INTERNAL MEDICINE
Payer: COMMERCIAL

## 2020-10-31 PROBLEM — I38 ENDOCARDITIS: Status: ACTIVE | Noted: 2020-10-31

## 2020-10-31 LAB
ALBUMIN SERPL-MCNC: 4.2 G/DL (ref 3.5–5.2)
ALP BLD-CCNC: 168 U/L (ref 35–104)
ALT SERPL-CCNC: 84 U/L (ref 0–32)
ANION GAP SERPL CALCULATED.3IONS-SCNC: 15 MMOL/L (ref 7–16)
ANION GAP SERPL CALCULATED.3IONS-SCNC: 17 MMOL/L (ref 7–16)
ANION GAP SERPL CALCULATED.3IONS-SCNC: 18 MMOL/L (ref 7–16)
ANION GAP SERPL CALCULATED.3IONS-SCNC: 18 MMOL/L (ref 7–16)
ANION GAP SERPL CALCULATED.3IONS-SCNC: 20 MMOL/L (ref 7–16)
APTT: 30 SEC (ref 24.5–35.1)
AST SERPL-CCNC: 64 U/L (ref 0–31)
BASOPHILS ABSOLUTE: 0.09 E9/L (ref 0–0.2)
BASOPHILS RELATIVE PERCENT: 1.1 % (ref 0–2)
BETA-HYDROXYBUTYRATE: 0.56 MMOL/L (ref 0.02–0.27)
BILIRUB SERPL-MCNC: 0.3 MG/DL (ref 0–1.2)
BUN BLDV-MCNC: 70 MG/DL (ref 6–20)
BUN BLDV-MCNC: 71 MG/DL (ref 6–20)
BUN BLDV-MCNC: 75 MG/DL (ref 6–20)
BUN BLDV-MCNC: 77 MG/DL (ref 6–20)
BUN BLDV-MCNC: 77 MG/DL (ref 6–20)
C DIFF TOXIN/ANTIGEN: NORMAL
C-REACTIVE PROTEIN: 2.5 MG/DL (ref 0–0.4)
C. DIFFICILE TOXIN MOLECULAR: NORMAL
CALCIUM SERPL-MCNC: 8.9 MG/DL (ref 8.6–10.2)
CALCIUM SERPL-MCNC: 9.1 MG/DL (ref 8.6–10.2)
CALCIUM SERPL-MCNC: 9.2 MG/DL (ref 8.6–10.2)
CALCIUM SERPL-MCNC: 9.2 MG/DL (ref 8.6–10.2)
CALCIUM SERPL-MCNC: 9.5 MG/DL (ref 8.6–10.2)
CHLORIDE BLD-SCNC: 101 MMOL/L (ref 98–107)
CHLORIDE BLD-SCNC: 101 MMOL/L (ref 98–107)
CHLORIDE BLD-SCNC: 91 MMOL/L (ref 98–107)
CHLORIDE BLD-SCNC: 97 MMOL/L (ref 98–107)
CHLORIDE BLD-SCNC: 99 MMOL/L (ref 98–107)
CHP ED QC CHECK: YES
CK MB: 2.1 NG/ML (ref 0–4.3)
CO2: 22 MMOL/L (ref 22–29)
CO2: 23 MMOL/L (ref 22–29)
CO2: 24 MMOL/L (ref 22–29)
CREAT SERPL-MCNC: 7.7 MG/DL (ref 0.5–1)
CREAT SERPL-MCNC: 7.7 MG/DL (ref 0.5–1)
CREAT SERPL-MCNC: 7.9 MG/DL (ref 0.5–1)
CREAT SERPL-MCNC: 7.9 MG/DL (ref 0.5–1)
CREAT SERPL-MCNC: 8 MG/DL (ref 0.5–1)
EKG ATRIAL RATE: 111 BPM
EKG P AXIS: 33 DEGREES
EKG P-R INTERVAL: 120 MS
EKG Q-T INTERVAL: 336 MS
EKG QRS DURATION: 78 MS
EKG QTC CALCULATION (BAZETT): 456 MS
EKG R AXIS: 46 DEGREES
EKG T AXIS: 30 DEGREES
EKG VENTRICULAR RATE: 111 BPM
EOSINOPHILS ABSOLUTE: 0.34 E9/L (ref 0.05–0.5)
EOSINOPHILS RELATIVE PERCENT: 4.3 % (ref 0–6)
GFR AFRICAN AMERICAN: 7
GFR AFRICAN AMERICAN: 8
GFR AFRICAN AMERICAN: 8
GFR NON-AFRICAN AMERICAN: 7 ML/MIN/1.73
GFR NON-AFRICAN AMERICAN: 8 ML/MIN/1.73
GFR NON-AFRICAN AMERICAN: 8 ML/MIN/1.73
GLUCOSE BLD-MCNC: 116 MG/DL (ref 74–99)
GLUCOSE BLD-MCNC: 161 MG/DL (ref 74–99)
GLUCOSE BLD-MCNC: 245 MG/DL (ref 74–99)
GLUCOSE BLD-MCNC: 619 MG/DL (ref 74–99)
GLUCOSE BLD-MCNC: 94 MG/DL (ref 74–99)
GLUCOSE BLD-MCNC: NORMAL MG/DL
HCT VFR BLD CALC: 32.1 % (ref 34–48)
HEMOGLOBIN: 9.9 G/DL (ref 11.5–15.5)
IMMATURE GRANULOCYTES #: 0.05 E9/L
IMMATURE GRANULOCYTES %: 0.6 % (ref 0–5)
INR BLD: 1
LACTIC ACID: 1.1 MMOL/L (ref 0.5–2.2)
LYMPHOCYTES ABSOLUTE: 0.99 E9/L (ref 1.5–4)
LYMPHOCYTES RELATIVE PERCENT: 12.4 % (ref 20–42)
MCH RBC QN AUTO: 29.8 PG (ref 26–35)
MCHC RBC AUTO-ENTMCNC: 30.8 % (ref 32–34.5)
MCV RBC AUTO: 96.7 FL (ref 80–99.9)
METER GLUCOSE: 119 MG/DL (ref 74–99)
METER GLUCOSE: 142 MG/DL (ref 74–99)
METER GLUCOSE: 188 MG/DL (ref 74–99)
METER GLUCOSE: 247 MG/DL (ref 74–99)
METER GLUCOSE: 62 MG/DL (ref 74–99)
METER GLUCOSE: 93 MG/DL (ref 74–99)
METER GLUCOSE: >500 MG/DL (ref 74–99)
MONOCYTES ABSOLUTE: 0.35 E9/L (ref 0.1–0.95)
MONOCYTES RELATIVE PERCENT: 4.4 % (ref 2–12)
NEUTROPHILS ABSOLUTE: 6.15 E9/L (ref 1.8–7.3)
NEUTROPHILS RELATIVE PERCENT: 77.2 % (ref 43–80)
PDW BLD-RTO: 17 FL (ref 11.5–15)
PLATELET # BLD: 271 E9/L (ref 130–450)
PMV BLD AUTO: 9.9 FL (ref 7–12)
POTASSIUM SERPL-SCNC: 5.1 MMOL/L (ref 3.5–5)
POTASSIUM SERPL-SCNC: 5.4 MMOL/L (ref 3.5–5)
POTASSIUM SERPL-SCNC: 5.7 MMOL/L (ref 3.5–5)
POTASSIUM SERPL-SCNC: 6.1 MMOL/L (ref 3.5–5)
POTASSIUM SERPL-SCNC: 6.3 MMOL/L (ref 3.5–5)
PROCALCITONIN: 0.45 NG/ML (ref 0–0.08)
PROTHROMBIN TIME: 10.6 SEC (ref 9.3–12.4)
RBC # BLD: 3.32 E12/L (ref 3.5–5.5)
SARS-COV-2, NAAT: NOT DETECTED
SEDIMENTATION RATE, ERYTHROCYTE: 35 MM/HR (ref 0–20)
SODIUM BLD-SCNC: 134 MMOL/L (ref 132–146)
SODIUM BLD-SCNC: 137 MMOL/L (ref 132–146)
SODIUM BLD-SCNC: 139 MMOL/L (ref 132–146)
SODIUM BLD-SCNC: 141 MMOL/L (ref 132–146)
SODIUM BLD-SCNC: 141 MMOL/L (ref 132–146)
T3 FREE: 1.3 PG/ML (ref 2–4.4)
T4 FREE: 0.78 NG/DL (ref 0.93–1.7)
TOTAL CK: 28 U/L (ref 20–180)
TOTAL PROTEIN: 7.1 G/DL (ref 6.4–8.3)
TROPONIN: 0.07 NG/ML (ref 0–0.03)
TROPONIN: 0.08 NG/ML (ref 0–0.03)
TSH SERPL DL<=0.05 MIU/L-ACNC: 6 UIU/ML (ref 0.27–4.2)
VANCOMYCIN RANDOM: 22.9 MCG/ML (ref 5–40)
WBC # BLD: 8 E9/L (ref 4.5–11.5)

## 2020-10-31 PROCEDURE — 99223 1ST HOSP IP/OBS HIGH 75: CPT | Performed by: INTERNAL MEDICINE

## 2020-10-31 PROCEDURE — 82550 ASSAY OF CK (CPK): CPT

## 2020-10-31 PROCEDURE — 87040 BLOOD CULTURE FOR BACTERIA: CPT

## 2020-10-31 PROCEDURE — 6370000000 HC RX 637 (ALT 250 FOR IP): Performed by: INTERNAL MEDICINE

## 2020-10-31 PROCEDURE — 85025 COMPLETE CBC W/AUTO DIFF WBC: CPT

## 2020-10-31 PROCEDURE — 86703 HIV-1/HIV-2 1 RESULT ANTBDY: CPT

## 2020-10-31 PROCEDURE — 6360000002 HC RX W HCPCS: Performed by: INTERNAL MEDICINE

## 2020-10-31 PROCEDURE — 87324 CLOSTRIDIUM AG IA: CPT

## 2020-10-31 PROCEDURE — 80202 ASSAY OF VANCOMYCIN: CPT

## 2020-10-31 PROCEDURE — 84145 PROCALCITONIN (PCT): CPT

## 2020-10-31 PROCEDURE — 3E1M39Z IRRIGATION OF PERITONEAL CAVITY USING DIALYSATE, PERCUTANEOUS APPROACH: ICD-10-PCS | Performed by: INTERNAL MEDICINE

## 2020-10-31 PROCEDURE — 82553 CREATINE MB FRACTION: CPT

## 2020-10-31 PROCEDURE — 86140 C-REACTIVE PROTEIN: CPT

## 2020-10-31 PROCEDURE — 83605 ASSAY OF LACTIC ACID: CPT

## 2020-10-31 PROCEDURE — 36415 COLL VENOUS BLD VENIPUNCTURE: CPT

## 2020-10-31 PROCEDURE — 99283 EMERGENCY DEPT VISIT LOW MDM: CPT

## 2020-10-31 PROCEDURE — U0002 COVID-19 LAB TEST NON-CDC: HCPCS

## 2020-10-31 PROCEDURE — 93005 ELECTROCARDIOGRAM TRACING: CPT | Performed by: EMERGENCY MEDICINE

## 2020-10-31 PROCEDURE — 85730 THROMBOPLASTIN TIME PARTIAL: CPT

## 2020-10-31 PROCEDURE — 2140000000 HC CCU INTERMEDIATE R&B

## 2020-10-31 PROCEDURE — 2580000003 HC RX 258: Performed by: INTERNAL MEDICINE

## 2020-10-31 PROCEDURE — 82962 GLUCOSE BLOOD TEST: CPT

## 2020-10-31 PROCEDURE — 82010 KETONE BODYS QUAN: CPT

## 2020-10-31 PROCEDURE — 2500000003 HC RX 250 WO HCPCS

## 2020-10-31 PROCEDURE — 84481 FREE ASSAY (FT-3): CPT

## 2020-10-31 PROCEDURE — 85610 PROTHROMBIN TIME: CPT

## 2020-10-31 PROCEDURE — 84443 ASSAY THYROID STIM HORMONE: CPT

## 2020-10-31 PROCEDURE — 71045 X-RAY EXAM CHEST 1 VIEW: CPT

## 2020-10-31 PROCEDURE — 90945 DIALYSIS ONE EVALUATION: CPT | Performed by: INTERNAL MEDICINE

## 2020-10-31 PROCEDURE — APPSS60 APP SPLIT SHARED TIME 46-60 MINUTES: Performed by: PHYSICIAN ASSISTANT

## 2020-10-31 PROCEDURE — 80053 COMPREHEN METABOLIC PANEL: CPT

## 2020-10-31 PROCEDURE — 6370000000 HC RX 637 (ALT 250 FOR IP): Performed by: EMERGENCY MEDICINE

## 2020-10-31 PROCEDURE — 93010 ELECTROCARDIOGRAM REPORT: CPT | Performed by: INTERNAL MEDICINE

## 2020-10-31 PROCEDURE — 87493 C DIFF AMPLIFIED PROBE: CPT

## 2020-10-31 PROCEDURE — 84439 ASSAY OF FREE THYROXINE: CPT

## 2020-10-31 PROCEDURE — 80048 BASIC METABOLIC PNL TOTAL CA: CPT

## 2020-10-31 PROCEDURE — 85651 RBC SED RATE NONAUTOMATED: CPT

## 2020-10-31 PROCEDURE — 2500000003 HC RX 250 WO HCPCS: Performed by: EMERGENCY MEDICINE

## 2020-10-31 PROCEDURE — 87449 NOS EACH ORGANISM AG IA: CPT

## 2020-10-31 PROCEDURE — 84484 ASSAY OF TROPONIN QUANT: CPT

## 2020-10-31 RX ORDER — DEXTROSE MONOHYDRATE 50 MG/ML
100 INJECTION, SOLUTION INTRAVENOUS PRN
Status: DISCONTINUED | OUTPATIENT
Start: 2020-10-31 | End: 2020-11-11 | Stop reason: HOSPADM

## 2020-10-31 RX ORDER — ONDANSETRON 2 MG/ML
4 INJECTION INTRAMUSCULAR; INTRAVENOUS EVERY 6 HOURS PRN
Status: DISCONTINUED | OUTPATIENT
Start: 2020-10-31 | End: 2020-11-11 | Stop reason: HOSPADM

## 2020-10-31 RX ORDER — SODIUM CHLORIDE 0.9 % (FLUSH) 0.9 %
10 SYRINGE (ML) INJECTION EVERY 12 HOURS SCHEDULED
Status: DISCONTINUED | OUTPATIENT
Start: 2020-10-31 | End: 2020-11-11 | Stop reason: HOSPADM

## 2020-10-31 RX ORDER — LEVOTHYROXINE SODIUM 0.05 MG/1
50 TABLET ORAL DAILY
Status: DISCONTINUED | OUTPATIENT
Start: 2020-10-31 | End: 2020-11-02

## 2020-10-31 RX ORDER — LORAZEPAM 0.5 MG/1
0.5 TABLET ORAL 2 TIMES DAILY PRN
Status: DISCONTINUED | OUTPATIENT
Start: 2020-10-31 | End: 2020-11-11 | Stop reason: HOSPADM

## 2020-10-31 RX ORDER — INSULIN GLARGINE 100 [IU]/ML
10 INJECTION, SOLUTION SUBCUTANEOUS NIGHTLY
Status: DISCONTINUED | OUTPATIENT
Start: 2020-10-31 | End: 2020-10-31

## 2020-10-31 RX ORDER — FAMOTIDINE 20 MG/1
20 TABLET, FILM COATED ORAL DAILY
Status: DISCONTINUED | OUTPATIENT
Start: 2020-10-31 | End: 2020-11-11 | Stop reason: HOSPADM

## 2020-10-31 RX ORDER — DEXTROSE MONOHYDRATE 100 MG/ML
INJECTION, SOLUTION INTRAVENOUS CONTINUOUS
Status: DISCONTINUED | OUTPATIENT
Start: 2020-10-31 | End: 2020-11-01

## 2020-10-31 RX ORDER — GENTAMICIN SULFATE 1 MG/G
CREAM TOPICAL DAILY
Status: DISCONTINUED | OUTPATIENT
Start: 2020-10-31 | End: 2020-11-11 | Stop reason: HOSPADM

## 2020-10-31 RX ORDER — CALCIUM GLUCONATE 94 MG/ML
1 INJECTION, SOLUTION INTRAVENOUS ONCE
Status: DISCONTINUED | OUTPATIENT
Start: 2020-10-31 | End: 2020-11-11 | Stop reason: HOSPADM

## 2020-10-31 RX ORDER — SODIUM CHLORIDE 0.9 % (FLUSH) 0.9 %
10 SYRINGE (ML) INJECTION PRN
Status: DISCONTINUED | OUTPATIENT
Start: 2020-10-31 | End: 2020-11-11 | Stop reason: HOSPADM

## 2020-10-31 RX ORDER — POLYETHYLENE GLYCOL 3350 17 G/17G
17 POWDER, FOR SOLUTION ORAL DAILY PRN
Status: DISCONTINUED | OUTPATIENT
Start: 2020-10-31 | End: 2020-11-11 | Stop reason: HOSPADM

## 2020-10-31 RX ORDER — NICOTINE POLACRILEX 4 MG
15 LOZENGE BUCCAL PRN
Status: DISCONTINUED | OUTPATIENT
Start: 2020-10-31 | End: 2020-11-11 | Stop reason: HOSPADM

## 2020-10-31 RX ORDER — ACETAMINOPHEN 650 MG/1
650 SUPPOSITORY RECTAL EVERY 6 HOURS PRN
Status: DISCONTINUED | OUTPATIENT
Start: 2020-10-31 | End: 2020-11-11 | Stop reason: HOSPADM

## 2020-10-31 RX ORDER — DEXTROSE MONOHYDRATE 25 G/50ML
12.5 INJECTION, SOLUTION INTRAVENOUS PRN
Status: DISCONTINUED | OUTPATIENT
Start: 2020-10-31 | End: 2020-11-11 | Stop reason: HOSPADM

## 2020-10-31 RX ORDER — ACETAMINOPHEN 325 MG/1
650 TABLET ORAL EVERY 6 HOURS PRN
Status: DISCONTINUED | OUTPATIENT
Start: 2020-10-31 | End: 2020-11-11 | Stop reason: HOSPADM

## 2020-10-31 RX ORDER — PROMETHAZINE HYDROCHLORIDE 25 MG/1
12.5 TABLET ORAL EVERY 6 HOURS PRN
Status: DISCONTINUED | OUTPATIENT
Start: 2020-10-31 | End: 2020-11-11 | Stop reason: HOSPADM

## 2020-10-31 RX ORDER — INSULIN GLARGINE 100 [IU]/ML
10 INJECTION, SOLUTION SUBCUTANEOUS NIGHTLY
Status: DISCONTINUED | OUTPATIENT
Start: 2020-10-31 | End: 2020-11-04

## 2020-10-31 RX ORDER — HEPARIN SODIUM 10000 [USP'U]/ML
5000 INJECTION, SOLUTION INTRAVENOUS; SUBCUTANEOUS EVERY 8 HOURS SCHEDULED
Status: DISCONTINUED | OUTPATIENT
Start: 2020-10-31 | End: 2020-11-06

## 2020-10-31 RX ADMIN — SODIUM BICARBONATE 50 MEQ: 84 INJECTION, SOLUTION INTRAVENOUS at 05:49

## 2020-10-31 RX ADMIN — HEPARIN SODIUM 5000 UNITS: 10000 INJECTION INTRAVENOUS; SUBCUTANEOUS at 09:54

## 2020-10-31 RX ADMIN — LIDOCAINE HYDROCHLORIDE: 20 SOLUTION ORAL; TOPICAL at 16:40

## 2020-10-31 RX ADMIN — GENTAMICIN SULFATE: 1 CREAM TOPICAL at 19:02

## 2020-10-31 RX ADMIN — DEXTROSE MONOHYDRATE: 100 INJECTION, SOLUTION INTRAVENOUS at 12:00

## 2020-10-31 RX ADMIN — HEPARIN SODIUM 5000 UNITS: 10000 INJECTION INTRAVENOUS; SUBCUTANEOUS at 16:14

## 2020-10-31 RX ADMIN — Medication 1 G: at 04:43

## 2020-10-31 RX ADMIN — Medication 10 ML: at 08:10

## 2020-10-31 RX ADMIN — FAMOTIDINE 20 MG: 20 TABLET ORAL at 16:15

## 2020-10-31 RX ADMIN — INSULIN LISPRO 4 UNITS: 100 INJECTION, SOLUTION INTRAVENOUS; SUBCUTANEOUS at 19:42

## 2020-10-31 RX ADMIN — ACETAMINOPHEN 650 MG: 325 TABLET ORAL at 16:15

## 2020-10-31 RX ADMIN — INSULIN GLARGINE 10 UNITS: 100 INJECTION, SOLUTION SUBCUTANEOUS at 21:31

## 2020-10-31 RX ADMIN — LIDOCAINE HYDROCHLORIDE: 20 SOLUTION ORAL; TOPICAL at 09:53

## 2020-10-31 RX ADMIN — LEVOTHYROXINE SODIUM 50 MCG: 0.05 TABLET ORAL at 11:16

## 2020-10-31 RX ADMIN — LORAZEPAM 0.5 MG: 0.5 TABLET ORAL at 21:26

## 2020-10-31 RX ADMIN — Medication 10 ML: at 21:26

## 2020-10-31 RX ADMIN — INSULIN HUMAN 10 UNITS: 100 INJECTION, SOLUTION PARENTERAL at 04:43

## 2020-10-31 RX ADMIN — HEPARIN SODIUM 5000 UNITS: 10000 INJECTION INTRAVENOUS; SUBCUTANEOUS at 22:41

## 2020-10-31 RX ADMIN — INSULIN LISPRO 2 UNITS: 100 INJECTION, SOLUTION INTRAVENOUS; SUBCUTANEOUS at 09:54

## 2020-10-31 RX ADMIN — INSULIN LISPRO 2 UNITS: 100 INJECTION, SOLUTION INTRAVENOUS; SUBCUTANEOUS at 16:40

## 2020-10-31 RX ADMIN — ONDANSETRON 4 MG: 2 INJECTION INTRAMUSCULAR; INTRAVENOUS at 08:10

## 2020-10-31 ASSESSMENT — PAIN DESCRIPTION - PAIN TYPE
TYPE: ACUTE PAIN
TYPE: ACUTE PAIN

## 2020-10-31 ASSESSMENT — PAIN DESCRIPTION - LOCATION
LOCATION: CHEST

## 2020-10-31 ASSESSMENT — PAIN DESCRIPTION - DESCRIPTORS
DESCRIPTORS: DISCOMFORT
DESCRIPTORS: ACHING;SORE;DISCOMFORT
DESCRIPTORS: BURNING;ACHING;CONSTANT

## 2020-10-31 ASSESSMENT — ENCOUNTER SYMPTOMS
SHORTNESS OF BREATH: 0
CHEST TIGHTNESS: 0
ABDOMINAL PAIN: 0
COUGH: 0

## 2020-10-31 ASSESSMENT — PAIN DESCRIPTION - ORIENTATION
ORIENTATION: LEFT
ORIENTATION: LEFT

## 2020-10-31 ASSESSMENT — PAIN SCALES - GENERAL
PAINLEVEL_OUTOF10: 0
PAINLEVEL_OUTOF10: 3
PAINLEVEL_OUTOF10: 9
PAINLEVEL_OUTOF10: 0
PAINLEVEL_OUTOF10: 0
PAINLEVEL_OUTOF10: 7

## 2020-10-31 ASSESSMENT — PAIN DESCRIPTION - ONSET: ONSET: ON-GOING

## 2020-10-31 ASSESSMENT — PAIN DESCRIPTION - FREQUENCY
FREQUENCY: CONTINUOUS
FREQUENCY: INTERMITTENT

## 2020-10-31 ASSESSMENT — PAIN SCALES - WONG BAKER: WONGBAKER_NUMERICALRESPONSE: 8
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
## 2020-10-31 NOTE — CONSULTS
performed by James Estrella MD at [address] N/A 12/18/2018    COLONOSCOPY WITH BIOPSY performed by Norberto Dolan MD at [address]  1/31/2012    EF 57%    ECHO COMPL W DOP COLOR FLOW  6/10/2013         LAPAROSCOPY INSERTION PERITONEAL CATHETER N/A 6/26/2020    LAPAROSCOPIC INSERTION PERITONEAL DIALYSIS CATHETER performed by Carol Shukla MD at [address] ESOPHAGOGASTRODUODENOSCOPY TRANSORAL DIAGNOSTIC N/A 5/30/2018    EGD ESOPHAGOGASTRODUODENOSCOPY performed by James Estrella MD at [address] [facility] TRANSESOPHAGEAL ECHOCARDIOGRAM N/A 10/19/2020    TRANSESOPHAGEAL ECHOCARDIOGRAM WITH BUBBLE STUDY performed by Cuco Elias MD at [address] [facility] TUNNELED VENOUS PORT PLACEMENT  04/2018    UPPER GASTROINTESTINAL ENDOSCOPY  12/18/2018    EGD BIOPSY performed by Norberto Dolan MD at [address] [employer] 10/11/2019    EGD ESOPHAGOGASTRODUODENOSCOPY performed by Shirley Figueroa DO at [facility] ENDOSCOPY       Current Medications:      Current Facility-Administered Medications   Medication Dose Route Frequency Provider Last Rate Last Dose    calcium gluconate 10 % injection 1 g  1 g Intravenous Once Darling Cool MD        sodium chloride flush 0.9 % injection 10 mL  10 mL Intravenous 2 times per day Low Astorga MD   10 mL at 10/31/20 0810    sodium chloride flush 0.9 % injection 10 mL  10 mL Intravenous PRN Low Astorga MD        acetaminophen (TYLENOL) tablet 650 mg  650 mg Oral Q6H PRN Low Astorga MD        Or    acetaminophen (TYLENOL) suppository 650 mg  650 mg Rectal Q6H PRN Low Astorga MD        polyethylene glycol (GLYCOLAX) packet 17 g  17 g Oral Daily PRN Low Astorga MD        promethazine (PHENERGAN) tablet 12.5 mg  12.5 mg Oral Q6H PRN Low Astorga MD        Or    ondansetron (ZOFRAN) injection 4 mg  4 mg Intravenous Q6H PRN Low Astorga MD   4 mg at 10/31/20 1849  heparin (porcine) injection 5,000 Units  5,000 Units Subcutaneous 3 times per day Keyana Newton MD   5,000 Units at 10/31/20 0954    levothyroxine (SYNTHROID) tablet 50 mcg  50 mcg Oral Daily Keyana Newton MD   50 mcg at 10/31/20 1116    glucose (GLUTOSE) 40 % oral gel 15 g  15 g Oral PRN Keyana Newton MD        dextrose 50 % IV solution  12.5 g Intravenous PRN Keyana Newton MD        glucagon (rDNA) injection 1 mg  1 mg Intramuscular PRN Keyana Newton MD        dextrose 5 % solution  100 mL/hr Intravenous PRN Keyana Newton MD        insulin glargine (LANTUS) injection vial 10 Units  10 Units Subcutaneous Nightly Tessa Qiu MD        dextrose 10 % infusion   Intravenous Continuous Tessa Qiu MD        vancomycin (VANCOCIN) oral solution 125 mg  125 mg Oral 4 times per day Tessa Qiu MD   125 mg at 10/31/20 1116    insulin lispro (HUMALOG) injection vial 0-12 Units  0-12 Units Subcutaneous Q3H (SCHEDULED) Tessa Qiu MD           Allergies:   Toradol [ketorolac tromethamine]    Social History:      Social History     Socioeconomic History    Marital status: Single     Spouse name: Not on file    Number of children: Not on file    Years of education: Not on file    Highest education level: Not on file   Occupational History    Not on file   Social Needs    Financial resource strain: Very hard    Food insecurity     Worry: Never true     Inability: Never true    Transportation needs     Medical: No     Non-medical: No   Tobacco Use    Smoking status: Current Every Day Smoker     Packs/day: 0.50     Years: 6.00     Pack years: 3.00     Types: Cigarettes    Smokeless tobacco: Current User   Substance and Sexual Activity    Alcohol use: No    Drug use: No     Comment: documented prior history of opioid abuse    Sexual activity: Yes     Partners: Male   Lifestyle    Physical activity     Days per week: Not on file     Minutes per session: Not on file    Stress: Not on file   Relationships    Social connections     Talks on phone: Not on file     Gets together: Not on file     Attends Baptism service: Not on file     Active member of club or organization: Not on file     Attends meetings of clubs or organizations: Not on file     Relationship status: Not on file    Intimate partner violence     Fear of current or ex partner: Not on file     Emotionally abused: Not on file     Physically abused: Not on file     Forced sexual activity: Not on file   Other Topics Concern    Not on file   Social History Narrative    Not on file       Family History:     Family History   Problem Relation Age of Onset    Asthma Mother     Hypertension Mother     High Blood Pressure Mother     Diabetes Mother     Asthma Brother     High Blood Pressure Father        REVIEW OF SYSTEMS: not able to obtain         PHYSICAL EXAM:      Vitals:     Vitals:    10/31/20 0800   BP: (!) 142/81   Pulse: 113   Resp: 20   Temp: 99.7 °F (37.6 °C)   SpO2: 90%       General Appearance:     Somnolent    Head:    Normocephalic, atraumatic   Eyes:    No pallor, no icterus,   Ears:    No obvious deformity or drainage.    Nose:   No nasal drainage   Throat:   Mucosa moist, no oral thrush   Neck:   Supple, no lymphadenopathy   Lungs:     Clear to auscultation bilaterally, no wheeze    Heart:    Tachycardic, systolic murmur +   Abdomen:     Soft, non-tender, bowel sounds present, PD catheter in place    Extremities:   No edema, no cyanosis   Pulses:   Dorsalis pedis palpable    Skin:   no rashes or lesions       DATA:      CBC with Differential:      Lab Results   Component Value Date    WBC 8.0 10/31/2020    RBC 3.32 10/31/2020    HGB 9.9 10/31/2020    HCT 32.1 10/31/2020     10/31/2020    MCV 96.7 10/31/2020    MCH 29.8 10/31/2020    MCHC 30.8 10/31/2020    RDW 17.0 10/31/2020    NRBC 0.9 08/10/2020    SEGSPCT 67 06/27/2013    METASPCT 1.7 08/10/2020    LYMPHOPCT 12.4 10/31/2020 MONOPCT 4.4 10/31/2020    BASOPCT 1.1 10/31/2020    MONOSABS 0.35 10/31/2020    LYMPHSABS 0.99 10/31/2020    EOSABS 0.34 10/31/2020    BASOSABS 0.09 10/31/2020       CMP     Lab Results   Component Value Date     10/31/2020    K 5.4 10/31/2020    K 3.7 10/11/2020    CL 97 10/31/2020    CO2 24 10/31/2020    BUN 77 10/31/2020    CREATININE 7.9 10/31/2020    GFRAA 7 10/31/2020    LABGLOM 7 10/31/2020    GLUCOSE 245 10/31/2020    GLUCOSE 130 05/18/2012    PROT 7.1 10/31/2020    LABALBU 4.2 10/31/2020    LABALBU 4.1 05/18/2012    CALCIUM 9.2 10/31/2020    BILITOT 0.3 10/31/2020    ALKPHOS 168 10/31/2020    AST 64 10/31/2020    ALT 84 10/31/2020         Hepatic Function Panel:    Lab Results   Component Value Date    ALKPHOS 168 10/31/2020    ALT 84 10/31/2020    AST 64 10/31/2020    PROT 7.1 10/31/2020    BILITOT 0.3 10/31/2020    BILIDIR <0.2 10/11/2020    IBILI see below 10/11/2020    LABALBU 4.2 10/31/2020    LABALBU 4.1 05/18/2012       PT/INR:    Lab Results   Component Value Date    PROTIME 10.6 10/31/2020    PROTIME 13.6 08/14/2011    INR 1.0 10/31/2020       TSH:    Lab Results   Component Value Date    TSH 6.000 10/31/2020       U/A:    Lab Results   Component Value Date    COLORU Straw 10/08/2020    PHUR 6.0 10/08/2020    LABCAST RARE 01/12/2020    WBCUA >20 10/08/2020    WBCUA 0-1 05/18/2012    RBCUA 2-5 10/08/2020    RBCUA 1-3 08/01/2013    MUCUS Present 02/12/2016    TRICHOMONAS Present 07/17/2020    YEAST RARE 05/24/2018    BACTERIA RARE 10/08/2020    CLARITYU SLCLOUDY 10/08/2020    SPECGRAV 1.020 10/08/2020    LEUKOCYTESUR Negative 10/08/2020    UROBILINOGEN 0.2 10/08/2020    BILIRUBINUR Negative 10/08/2020    BILIRUBINUR SMALL 05/18/2012    BLOODU MODERATE 10/08/2020    GLUCOSEU >=1000 10/08/2020    GLUCOSEU >=1000 05/18/2012    AMORPHOUS RARE 11/01/2019       ABG:    Lab Results   Component Value Date    JWA2PGS 22.1 10/29/2019    E2AIAQWL 97.7 09/08/2012    PHART 7.345 07/20/2012    QXB4GHU 35.0 10/29/2019    PO2ART 91.5 10/29/2019       MICROBIOLOGY:    Blood culture - Staph epidermidis  ( 10/8)       YULISSA -     Large irregular, 2cm x 1cm, mobile, echogenic mass attached to the right   atrial side of interatrial septum near SVC opening suggestive of   vegetation or thrombus. Large, 1cm x 1cm, irregular echogenic mass attached to the tip of the   central venous catheter suggestive of vegetation or thrombus. Normal left ventricular systolic function. Interatrial septum intact. Agitated saline injection showed no right to left shunt. Normal right ventricle size and function. There is mild tricuspid regurgitation. Posterior mitral leaflet prolapse. There is moderate tricuspid regurgitation. Normal aortic root size. No evidence of pericardial effusion. Pericardium appears normal.   Technically sub-optimal images. Compared to prior TTE from 1/13/2020. IMPRESSION:     1. 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 Vancomycin random level   2.  CT surgery consult     Thank you Dr Jacinto Radford for the consult

## 2020-10-31 NOTE — PROGRESS NOTES
Cardiology consult sent to [facility] via [address]. Renal consult sent to Dr Ninoska Calderon via [address].

## 2020-10-31 NOTE — PROGRESS NOTES
Dr. Skip Ramos,    Please note: Your patient is on a medication that requires a renal dose adjustment. Renal Function Assessment:    Date Body Weight IBW Adj. Body Weight SCr CrCl Dialysis status   10/31/2020 67.1 kg N/a N/a 7.7 10 ml/min Yes       Pharmacy has renally dose-adjusted the following medication(s):    Date Medication Original Dosing Regimen New Dosing Regimen   10/31/2020 Pepcid 20 mg PO twice daily 20 mg PO daily           These changes were made per protocol according to the [facility] Renal Function-Based Dose Adjustments Policy    *Please note this dose may need readjusted if your patient's renal function significantly improves. Please contact pharmacy with any questions regarding these changes.     Shahida Pantoja RPh 10/31/2020 3:50 PM

## 2020-10-31 NOTE — ED NOTES
Patient aware that we need urine sample when she is able to provide one, unable to do at this time.       Guy Kelly RN  10/31/20 2199

## 2020-10-31 NOTE — PROGRESS NOTES
Spoke with Vivian Parisi in microbiology department regarding C Diff results. At this time patient, is C Diff negative. Isolation discontinued, bed request cancelled.

## 2020-10-31 NOTE — H&P
[facility] [address]  Internal Medicine Residency Program  History and Physical    Patient:  Gordon Matias 31 y.o. female MRN: [id number]     Date of Service: 10/31/2020    Hospital Day: 1      Chief complaint: had concerns including Chest Pain (dx with endocarditis signed out from [address] [place] [place] [address] yesterday). History of Present Illness   The patient is a 31 y.o. female with past medical history significant for ESRD on peritoneal dialysis, type 1 diabetes mellitus, hypertension, hypothyroidism, chronic diarrhea  opioid abuse presented to the emergency department today because she felt extremely fatigued at home, she also complained of some associated chest discomfort on presentation. Patient initially presented to [address] [place] [address] 10/8/20  with 1 month history of feeling unwell. At the time of presentation she complained of low back pain, vomiting, hyperglycemia, dyspnea on exertion. In [place] [facility] patient was diagnosed with endocarditis, vegetations were noted to be 1x2 cm and 1x1 cm in the right atrium. 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 The port was removed at [address] [place] [address] on 10/27. There was also concern for patient being positive for C. Difficile-PCR was noted to be positive but C. difficile toxin EIA was noted to be negative. patient left from [facility] clinic [facility] on October 30.     Past Medical History:       Diagnosis Date    Acute congestive heart failure ([address]     BC (acute kidney injury) ([address] 10/1/2019    Cephalgia 10/9/2019    Chronic kidney disease     Depression     Diabetes mellitus ([address]     Diabetic ketoacidosis ([address] 8/27/2011    Hemod[facility]     History of blood transfusion 11/2019    Hyperlipidemia 10/8/2020    Hypothyroidism 10/8/2020    Iron deficiency anemia 10/1/2019    MDRO (multiple drug resistant organisms) resistance     MRSA (methicillin resistant Staphylococcus aureus)     back wound abcess    Non compliance w medication regimen 3/30/2016    Other disorders of kidney and ureter     Pregnancy 3/30/2016    16 weeks    Severe pre-eclampsia in third trimester 2016       Past Surgical History:        Procedure Laterality Date    BACK SURGERY      abscess     SECTION      x2    CHOLECYSTECTOMY, LAPAROSCOPIC N/A 2019    CHOLECYSTECTOMY LAPAROSCOPIC performed by Hank Olson MD at [address] [facility] N/A 2018    COLONOSCOPY WITH BIOPSY performed by Pavithra Rodriguez MD at [address] [facility] N/A 2018    COLONOSCOPY WITH BIOPSY performed by Nae Pina MD at [address] [facility] [address]  2012    EF 57%    ECHO COMPL W DOP COLOR FLOW  6/10/2013         LAPAROSCOPY INSERTION PERITONEAL CATHETER N/A 2020    LAPAROSCOPIC INSERTION PERITONEAL DIALYSIS CATHETER performed by Jonathon Roberts MD at [address] [place] [address] ESOPHAGOGASTRODUODENOSCOPY TRANSORAL DIAGNOSTIC N/A 2018    EGD ESOPHAGOGASTRODUODENOSCOPY performed by Pavithra Rodriguez MD at [address] [facility] TRANSESOPHAGEAL ECHOCARDIOGRAM N/A 10/19/2020    TRANSESOPHAGEAL ECHOCARDIOGRAM WITH BUBBLE STUDY performed by Amina Welsh MD at [address] [facility] [address] [facility]  2018    UPPER GASTROINTESTINAL ENDOSCOPY  2018    EGD BIOPSY performed by Nae Pina MD at [address] [facility] 10/11/2019    EGD ESOPHAGOGASTRODUODENOSCOPY performed by Sergio Romero DO at [place] [id number]       Medications Prior to Admission:    Prior to Admission medications    Medication Sig Start Date End Date Taking?  Authorizing Provider   Heparin Sod, Porcine, in D5W (HEPARIN, PORCINE,) 100 UNIT/ML SOLN infusion As per nomogram 10/24/20   Nickie Chou MD   insulin glargine (LANTUS) 100 UNIT/ML injection vial Inject 12 Units into the skin every morning 10/25/20   Len Lowery Claudia Lacy MD   insulin lispro (HUMALOG) 100 UNIT/ML injection vial Inject 5 Units into the skin 3 times daily (with meals) 10/24/20   Kathleen Garcia MD   insulin lispro (HUMALOG) 100 UNIT/ML injection vial Inject 0-6 Units into the skin 3 times daily (with meals) **Corrective Low Dose Algorithm**  Glucose: Dose:               No Insulin  140-199 1 Unit  200-249 2 Units  250-299 3 Units  300-349 4 Units  350-399 5 Units  Over 399 6 Units 10/24/20   Kathleen Garcia MD   insulin lispro (HUMALOG) 100 UNIT/ML injection vial Inject 0-3 Units into the skin nightly Glucose: Dose:               No Insulin  140-249 1 Unit  250-349 2 Units  Over 350 3 Units 10/24/20   Kathleen Garcia MD   lactobacillus (CULTURELLE) CAPS capsule Take 1 capsule by mouth every 12 hours 10/24/20   Kathleen Garcia MD   Insulin Syringes, Disposable, U-100 0.3 ML MISC To use daily 10/9/20   Dhruv Epstein MD   metOLazone (ZAROXOLYN) 5 MG tablet Take 5 mg by mouth daily    Historical Provider, MD   vitamin D (ERGOCALCIFEROL) 1.25 MG (19494 UT) CAPS capsule Take 50,000 Units by mouth once a week Saturday    Historical Provider, MD   Multiple Vitamins-Minerals (RENAPLEX-D) TABS Take 1 tablet by mouth daily    Historical Provider, MD   cloNIDine (CATAPRES) 0.1 MG tablet Take 0.1 mg by mouth 2 times daily as needed for High Blood Pressure    Historical Provider, MD   levothyroxine (SYNTHROID) 50 MCG tablet Take 1 tablet by mouth Daily 9/3/20   Leah Schmidt MD   atorvastatin (LIPITOR) 40 MG tablet Take 1 tablet by mouth nightly 9/3/20   Leah Schmidt MD   dilTIAZem (CARDIZEM CD) 240 MG extended release capsule Take 1 capsule by mouth daily 9/3/20   Leah Schmidt MD   blood glucose test strips (ASCENSIA AUTODISC VI;ONE TOUCH ULTRA TEST VI) strip Indications: 5x a day Freestyle Lite -Tests 5 times daily and as needed for low glucose.   E10.10 7/29/20   Leah Schmidt MD   Lancets Thin MISC Indications: cks 5xa a day cks 5xa a day 7/29/20 Suly Bond MD   epoetin nena-epbx (RETACRIT) 4000 UNIT/ML SOLN injection Inject 2 mLs into the skin three times a week Per Nephrology 2/17/20   HEBER Bello - CNP   LORazepam (ATIVAN) 0.5 MG tablet Take 0.5 mg by mouth 2 times daily as needed for Anxiety. Historical Provider, MD   glucose (GLUTOSE) 40 % GEL Take 15 g by mouth as needed 4/1/16   Catherine Garcia MD   Insulin Syringe-Needle U-100 (INSULIN SYRINGE .5CC/30GX1/2\") 30G X 1/2\" 0.5 ML MISC by Does not apply route. Indications: uses 6-7 a day    Historical Provider, MD       Allergies: Toradol [ketorolac tromethamine]    Social History:   TOBACCO:   reports that she has been smoking cigarettes. She has a 3.00 pack-year smoking history. She uses smokeless tobacco.  ETOH:   reports no history of alcohol use. Family History:       Problem Relation Age of Onset   24 \[facility]\"" Asthma Mother     Hypertension Mother     High Blood Pressure Mother     Diabetes Mother     Asthma Brother     High Blood Pressure Father        REVIEW OF SYSTEMS:    Review of Systems   Constitutional: Positive for activity change, appetite change, chills, fatigue and fever. HENT: Negative for congestion and dental problem. Respiratory: Negative for cough, chest tightness and shortness of breath. Cardiovascular: Negative for chest pain. Gastrointestinal: Negative for abdominal pain. Genitourinary: Negative for difficulty urinating. Musculoskeletal: Positive for myalgias. Negative for arthralgias. Psychiatric/Behavioral: Positive for dysphoric mood. The patient is nervous/anxious. All other systems reviewed and are negative. ·      Physical Exam   · Vitals: BP (!) 169/106   Pulse 114   Temp 98 °F (36.7 °C)   Resp 18   Ht 5' 4\" (1.626 m)   Wt 148 lb (67.1 kg)   LMP  (LMP Unknown)   SpO2 97%   BMI 25.40 kg/m²     {Physical Exam  Vitals signs and nursing note reviewed. Constitutional:       Appearance: She is ill-appearing and toxic-appearing.    HENT: for exam:->pain and effusion after fall FINDINGS: The bone mineralization is within normal limits. The joint spaces appear unremarkable. No acute fractures or dislocations are seen. There is no soft tissue swelling. No bony erosions are seen. 1. No acute abnormality involving the left knee. Ct Head Wo Contrast    Result Date: 10/20/2020  EXAMINATION: CT OF THE HEAD WITHOUT CONTRAST  10/20/2020 6:25 pm TECHNIQUE: CT of the head was performed without the administration of intravenous contrast. Dose modulation, iterative reconstruction, and/or weight based adjustment of the mA/kV was utilized to reduce the radiation dose to as low as reasonably achievable. COMPARISON: CT head from October 13, 2020 HISTORY: ORDERING SYSTEM PROVIDED HISTORY: Decreased LOC TECHNOLOGIST PROVIDED HISTORY: Reason for exam:->Decreased LOC Has a \"code stroke\" or \"stroke alert\" been called? ->No FINDINGS: BRAIN/VENTRICLES: There is no acute intracranial hemorrhage, mass effect or midline shift. No abnormal extra-axial fluid collection. The gray-white differentiation is maintained without evidence of an acute infarct. There is no evidence of hydrocephalus. ORBITS: The visualized portion of the orbits demonstrate no acute abnormality. SINUSES: The visualized paranasal sinuses and mastoid air cells demonstrate no acute abnormality. SOFT TISSUES/SKULL:  No acute abnormality of the visualized skull or soft tissues. No acute intracranial abnormality. Ct Head Wo Contrast    Result Date: 10/13/2020  EXAMINATION: CT OF THE HEAD WITHOUT CONTRAST  10/13/2020 11:10 am TECHNIQUE: CT of the head was performed without the administration of intravenous contrast. Dose modulation, iterative reconstruction, and/or weight based adjustment of the mA/kV was utilized to reduce the radiation dose to as low as reasonably achievable. COMPARISON: 02/04/2020.  HISTORY: ORDERING SYSTEM PROVIDED HISTORY: altered mental status TECHNOLOGIST PROVIDED HISTORY: Reason for exam:->altered mental status Has a \"code stroke\" or \"stroke alert\" been called? ->No FINDINGS: BRAIN/VENTRICLES: There is no acute intracranial hemorrhage, mass effect or midline shift. No abnormal extra-axial fluid collection. The gray-white differentiation is maintained without evidence of an acute infarct. There is no evidence of hydrocephalus. ORBITS: The visualized portion of the orbits demonstrate no acute abnormality. SINUSES: The visualized paranasal sinuses and mastoid air cells demonstrate no acute abnormality. SOFT TISSUES/SKULL:  No acute abnormality of the visualized skull or soft tissues. 1.No acute intracranial abnormality. Xr Chest Portable    Result Date: 10/31/2020  EXAMINATION: ONE XRAY VIEW OF THE CHEST 10/31/2020 3:02 am COMPARISON: Chest x-ray from 10/13/2020 HISTORY: ORDERING SYSTEM PROVIDED HISTORY: Possible sepsis TECHNOLOGIST PROVIDED HISTORY: Reason for exam:->Possible sepsis What reading provider will be dictating this exam?->CRC FINDINGS: Mild areas of patchy opacities are noted in the bilateral lower lungs which could represent atelectasis versus mild/early airspace disease process. Mild cardiomegaly. No evidence of pleural effusion. Visualized osseous structures are unremarkable. Mild areas of patchy opacities in the bilateral lower lungs which could represent atelectasis versus airspace disease process. Mild cardiomegaly. Xr Chest Portable    Result Date: 10/13/2020  EXAMINATION: ONE XRAY VIEW OF THE CHEST 10/13/2020 12:22 pm COMPARISON: 10/08/2020. HISTORY: ORDERING SYSTEM PROVIDED HISTORY: Cough, concern for aspiration TECHNOLOGIST PROVIDED HISTORY: Reason for exam:->concern for aspiration FINDINGS: The heart size is within normal limits. The pulmonary vasculature is also within normal limits. No acute infiltrates are seen. The costophrenic angles are sharp bilaterally. No pneumothoraces are noted. There is a left chest wall MediPort with the tip in the superior vena cava. 1. No active pulmonary disease. Xr Chest Portable    Result Date: 10/8/2020  EXAMINATION: ONE XRAY VIEW OF THE CHEST 10/7/2020 2:05 pm COMPARISON: April 10, 2020 HISTORY: ORDERING SYSTEM PROVIDED HISTORY: syncope TECHNOLOGIST PROVIDED HISTORY: Reason for exam:->syncope What reading provider will be dictating this exam?->CRC FINDINGS: No airspace opacity or pleural effusion. No pneumothorax. Left MediPort is present. The heart is normal in size. No free air beneath the hemidiaphragms. No evidence of pneumonia or pleural effusion. Mri Brain Wo Contrast    Result Date: 10/23/2020  EXAMINATION: MRI OF THE BRAIN WITHOUT CONTRAST  10/23/2020 10:54 am TECHNIQUE: Multiplanar multisequence MRI of the brain was performed without the administration of intravenous contrast. COMPARISON: None. HISTORY: ORDERING SYSTEM PROVIDED HISTORY: encephalopathy, left sided weakness TECHNOLOGIST PROVIDED HISTORY: Reason for exam:->encephalopathy, left sided weakness FINDINGS: INTRACRANIAL STRUCTURES/VENTRICLES: There are unusual curvilinear areas of restricted diffusion in the parietal centrum semiovale bilaterally seen on series 5 images 16 through 18. There is only very subtle minimal deep white matter edema on the T2 and FLAIR images. The findings are suggestive of a nonspecific encephalopathy, most likely metabolic or toxic. Otherwise, no acute infarct is seen. No mass effect or midline shift. No evidence of an acute intracranial hemorrhage. The ventricles and sulci are normal in size and configuration. The sellar/suprasellar regions appear unremarkable. The normal signal voids within the major intracranial vessels appear maintained. ORBITS: The visualized portion of the orbits demonstrate no acute abnormality. SINUSES: The visualized paranasal sinuses and mastoid air cells are well aerated. There is minimal mucosal thickening in the ethmoid and maxillary sinuses bilaterally. BONES/SOFT TISSUES: The bone marrow signal intensity appears normal. The soft tissues demonstrate no acute abnormality. 1. Symmetric areas of restricted diffusion in the parietal deep white matter bilaterally, consistent with encephalopathy, most likely metabolic or toxic. Follow-up MRI of the brain is suggested. 2. Otherwise, unremarkable MRI of the brain. No evidence of acute infarct or intracranial hemorrhage. Us Dup Lower Extremities Bilateral Venous    Result Date: 10/13/2020  EXAMINATION: DUPLEX VENOUS ULTRASOUND OF THE BILATERAL LOWER EXTREMITIES, 10/13/2020 1:02 pm TECHNIQUE: Duplex ultrasound and Doppler images were obtained of the bilateral lower extremities COMPARISON: 02/08/2020 HISTORY: ORDERING SYSTEM PROVIDED HISTORY: r/o dvt TECHNOLOGIST PROVIDED HISTORY: Reason for exam:->r/o dvt What reading provider will be dictating this exam?->CRC FINDINGS: The visualized veins of the bilateral lower extremities are patent and free of echogenic thrombus. The veins are normally compressible and have normal phasic flow. No evidence of DVT in either lower extremity. Resident's Assessment and Plan     The patient is a 31 y.o. female     1. 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 Large 1x1 cm, irregular echogenic mass attached to tip of the cvc suggestive of vegetation or thrombus  -Concern for septic emboli to lung 2/2 non compliance    -ID consulted, continue vancomycin. Not a candidate for surgery at this time  2. ESRD 2/2 diabetic nephropathy on PD, nephrology consulted, currently needs dialysis   3. Type 1 DM, uncontrolled, started on HDSS, resume home dose lantus at night  4. Hx of hypertension  5. Chronic Diarrhea, previously worked up for infectious causes.  Most likely 2/2 diabetic enteropathy, microscopic colitis. Concern for C.diff at [facility], PCR was + but EIA was neg.    6. Hx of Substance use     Diet: Carb control  Peptic ulcer prophylaxis: PPI  DVT Prophylaxis: Lovenox  Disposition: Transfer to telemetry     Patel Perry MD PGY-2   Internal medicine resident  Attending Physician: Dr. Glendy Casillas
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
## 2020-10-31 NOTE — PROGRESS NOTES
Patient makes small amount of urine due to peritoneal dialysis. Unable to obtain ordered urine studies due to this. Hat placed in patient's toilet, will continue to attempt to obtain sample.

## 2020-10-31 NOTE — PROGRESS NOTES
Internal medicine resident notified that patient lost IV access so IV fluids are unable to be administered as ordered. 3 RNs attempted. Notified that plan at this time is to  leave IV out overnight and have IV team try in the morning. Message read by resident, no orders obtained.

## 2020-10-31 NOTE — PLAN OF CARE
Problem: Pain:  Goal: Pain level will decrease  Description: Pain level will decrease  Outcome: Met This Shift     Problem: Skin Integrity:  Goal: Absence of new skin breakdown  Description: Absence of new skin breakdown  Outcome: Met This Shift     Problem: Falls - Risk of:  Goal: Will remain free from falls  Description: Will remain free from falls  10/31/2020 1941 by Soledad Nick RN  Outcome: Met This Shift  10/31/2020 1100 by Soledad Nick RN  Outcome: Met This Shift  Goal: Absence of physical injury  Description: Absence of physical injury  Outcome: Met This Shift

## 2020-10-31 NOTE — ED NOTES
sbar faxed, floor notified, patient placed in transport      [facility]weyGe[facility]  10/31/20 1430

## 2020-10-31 NOTE — H&P
[facility]  Internal Medicine Clinic    Attending Physician Statement:  Addie Turner M.D., F.A.C.P. I have discussed the case, including pertinent history and exam findings with the resident/NP. I have seen and examined the patient and the key elements of the encounter have been performed by me. I agree with the resident ROS, PMHx, PSHx, meds reviewed and assessment, plan and orders as documented by the resident/NP      Hospital charts reviewed, including other providers notes, relevant labs and imaging. Bacteriemia staph epi (either due to chest access- mediport vs tessio- removed)  Endocarditis, discussed indications for emergent surgery, OK to defer intervention for now  tricupsid vegetation-- piror septic emboli to lung?  cxr--  patchy opacities in the bilateral lower lungs which could   represent atelectasis versus airspace disease process. Abx per ID    Difficult IV access--picc? Line vs central line-- culture negative recently at [address] [place] [address]  She left AMA there, didn't want to fu  Looks ill-- needs abx soon    Anemia-- ACD  dm1-- ESRD- -peritoneal dialysis  BS uncontrolled, high risk of rapid progressive DKA-- +betahydro -- given insulin Q3  Low intensity +D5-10 drip  Chronic diarrhea   +PCR Cdif hx recently- continue oral vanco (+on 10/25-- will need 8-more days?)  Hypothyroid- IV vs PO  >50% of time spent coordinating care with other providers and/or counseling patient/family  Remainder of medical problems as per resident note.

## 2020-11-01 PROBLEM — E87.1 HYPONATREMIA: Status: RESOLVED | Noted: 2018-07-19 | Resolved: 2020-11-01

## 2020-11-01 PROBLEM — Z34.91 FIRST TRIMESTER PREGNANCY: Status: RESOLVED | Noted: 2018-08-17 | Resolved: 2020-11-01

## 2020-11-01 PROBLEM — E87.29 HIGH ANION GAP METABOLIC ACIDOSIS: Status: RESOLVED | Noted: 2019-10-01 | Resolved: 2020-11-01

## 2020-11-01 PROBLEM — N20.0 KIDNEY STONES: Status: RESOLVED | Noted: 2017-09-08 | Resolved: 2020-11-01

## 2020-11-01 PROBLEM — J96.01 ACUTE RESPIRATORY FAILURE WITH HYPOXIA (HCC): Status: RESOLVED | Noted: 2019-10-29 | Resolved: 2020-11-01

## 2020-11-01 PROBLEM — R07.9 CHEST PAIN, UNSPECIFIED: Status: RESOLVED | Noted: 2020-01-13 | Resolved: 2020-11-01

## 2020-11-01 PROBLEM — R11.2 INTRACTABLE NAUSEA AND VOMITING: Status: RESOLVED | Noted: 2020-07-17 | Resolved: 2020-11-01

## 2020-11-01 PROBLEM — E88.09 HYPOALBUMINEMIA: Status: RESOLVED | Noted: 2020-07-24 | Resolved: 2020-11-01

## 2020-11-01 PROBLEM — J69.0 ASPIRATION PNEUMONIA OF BOTH LUNGS (HCC): Status: RESOLVED | Noted: 2019-10-29 | Resolved: 2020-11-01

## 2020-11-01 PROBLEM — R10.84 GENERALIZED ABDOMINAL PAIN: Status: RESOLVED | Noted: 2017-09-27 | Resolved: 2020-11-01

## 2020-11-01 PROBLEM — R10.84 GENERALIZED ABDOMINAL PAIN: Status: RESOLVED | Noted: 2018-12-17 | Resolved: 2020-11-01

## 2020-11-01 PROBLEM — R41.82 ALTERED MENTAL STATUS: Status: RESOLVED | Noted: 2020-02-04 | Resolved: 2020-11-01

## 2020-11-01 PROBLEM — R51.9 HEADACHE: Status: RESOLVED | Noted: 2019-10-09 | Resolved: 2020-11-01

## 2020-11-01 PROBLEM — N18.9 ACUTE KIDNEY INJURY SUPERIMPOSED ON CHRONIC KIDNEY DISEASE (HCC): Status: RESOLVED | Noted: 2019-10-01 | Resolved: 2020-11-01

## 2020-11-01 PROBLEM — A41.9 SEPTICEMIA (HCC): Status: RESOLVED | Noted: 2018-04-15 | Resolved: 2020-11-01

## 2020-11-01 PROBLEM — E10.10 DIABETIC KETOACIDOSIS WITHOUT COMA ASSOCIATED WITH TYPE 1 DIABETES MELLITUS (HCC): Status: RESOLVED | Noted: 2018-03-18 | Resolved: 2020-11-01

## 2020-11-01 PROBLEM — K52.9 CHRONIC DIARRHEA: Status: ACTIVE | Noted: 2020-11-01

## 2020-11-01 PROBLEM — N17.9 ACUTE KIDNEY INJURY SUPERIMPOSED ON CHRONIC KIDNEY DISEASE (HCC): Status: RESOLVED | Noted: 2019-10-01 | Resolved: 2020-11-01

## 2020-11-01 PROBLEM — E13.69 OTHER SPECIFIED DIABETES MELLITUS WITH OTHER SPECIFIED COMPLICATION (HCC): Status: RESOLVED | Noted: 2018-08-17 | Resolved: 2020-11-01

## 2020-11-01 PROBLEM — N18.9 ACUTE KIDNEY INJURY SUPERIMPOSED ON CKD (HCC): Status: RESOLVED | Noted: 2018-12-14 | Resolved: 2020-11-01

## 2020-11-01 PROBLEM — E87.5 HYPERKALEMIA: Status: RESOLVED | Noted: 2020-10-16 | Resolved: 2020-11-01

## 2020-11-01 PROBLEM — N17.9 ACUTE KIDNEY INJURY SUPERIMPOSED ON CKD (HCC): Status: RESOLVED | Noted: 2018-12-14 | Resolved: 2020-11-01

## 2020-11-01 PROBLEM — K52.9 ACUTE GASTROENTERITIS: Status: RESOLVED | Noted: 2019-10-01 | Resolved: 2020-11-01

## 2020-11-01 PROBLEM — R11.2 INTRACTABLE VOMITING WITH NAUSEA: Status: RESOLVED | Noted: 2020-07-16 | Resolved: 2020-11-01

## 2020-11-01 PROBLEM — N94.6 MENSES PAINFUL: Status: RESOLVED | Noted: 2017-09-08 | Resolved: 2020-11-01

## 2020-11-01 PROBLEM — R94.31 ACUTE ELECTROCARDIOGRAM CHANGES: Status: RESOLVED | Noted: 2018-08-17 | Resolved: 2020-11-01

## 2020-11-01 PROBLEM — E16.2 HYPOGLYCEMIA: Status: RESOLVED | Noted: 2018-04-30 | Resolved: 2020-11-01

## 2020-11-01 PROBLEM — R19.7 DIARRHEA IN ADULT PATIENT: Status: RESOLVED | Noted: 2018-12-17 | Resolved: 2020-11-01

## 2020-11-01 LAB
ALBUMIN SERPL-MCNC: 3.1 G/DL (ref 3.5–5.2)
ALP BLD-CCNC: 119 U/L (ref 35–104)
ALT SERPL-CCNC: 54 U/L (ref 0–32)
AMPHETAMINE SCREEN, URINE: NOT DETECTED
ANION GAP SERPL CALCULATED.3IONS-SCNC: 20 MMOL/L (ref 7–16)
AST SERPL-CCNC: 31 U/L (ref 0–31)
BACTERIA: ABNORMAL /HPF
BARBITURATE SCREEN URINE: NOT DETECTED
BASOPHILS ABSOLUTE: 0.12 E9/L (ref 0–0.2)
BASOPHILS RELATIVE PERCENT: 1.6 % (ref 0–2)
BENZODIAZEPINE SCREEN, URINE: NOT DETECTED
BILIRUB SERPL-MCNC: 0.3 MG/DL (ref 0–1.2)
BILIRUBIN URINE: NEGATIVE
BLOOD, URINE: ABNORMAL
BUN BLDV-MCNC: 64 MG/DL (ref 6–20)
CALCIUM SERPL-MCNC: 9.1 MG/DL (ref 8.6–10.2)
CANNABINOID SCREEN URINE: NOT DETECTED
CHLORIDE BLD-SCNC: 99 MMOL/L (ref 98–107)
CLARITY: CLEAR
CO2: 21 MMOL/L (ref 22–29)
COCAINE METABOLITE SCREEN URINE: NOT DETECTED
COLOR: YELLOW
CREAT SERPL-MCNC: 7.5 MG/DL (ref 0.5–1)
EOSINOPHILS ABSOLUTE: 0.47 E9/L (ref 0.05–0.5)
EOSINOPHILS RELATIVE PERCENT: 6.2 % (ref 0–6)
FENTANYL SCREEN, URINE: NOT DETECTED
GFR AFRICAN AMERICAN: 8
GFR NON-AFRICAN AMERICAN: 8 ML/MIN/1.73
GLUCOSE BLD-MCNC: 93 MG/DL (ref 74–99)
GLUCOSE URINE: 250 MG/DL
HCT VFR BLD CALC: 30.6 % (ref 34–48)
HEMOGLOBIN: 9.5 G/DL (ref 11.5–15.5)
IMMATURE GRANULOCYTES #: 0.01 E9/L
IMMATURE GRANULOCYTES %: 0.1 % (ref 0–5)
KETONES, URINE: NEGATIVE MG/DL
LEUKOCYTE ESTERASE, URINE: NEGATIVE
LYMPHOCYTES ABSOLUTE: 1.71 E9/L (ref 1.5–4)
LYMPHOCYTES RELATIVE PERCENT: 22.5 % (ref 20–42)
Lab: ABNORMAL
MAGNESIUM: 2.3 MG/DL (ref 1.6–2.6)
MCH RBC QN AUTO: 29.8 PG (ref 26–35)
MCHC RBC AUTO-ENTMCNC: 31 % (ref 32–34.5)
MCV RBC AUTO: 95.9 FL (ref 80–99.9)
METER GLUCOSE: 104 MG/DL (ref 74–99)
METER GLUCOSE: 105 MG/DL (ref 74–99)
METER GLUCOSE: 117 MG/DL (ref 74–99)
METER GLUCOSE: 154 MG/DL (ref 74–99)
METER GLUCOSE: 180 MG/DL (ref 74–99)
METER GLUCOSE: 290 MG/DL (ref 74–99)
METER GLUCOSE: 91 MG/DL (ref 74–99)
METHADONE SCREEN, URINE: NOT DETECTED
MONOCYTES ABSOLUTE: 0.5 E9/L (ref 0.1–0.95)
MONOCYTES RELATIVE PERCENT: 6.6 % (ref 2–12)
NEUTROPHILS ABSOLUTE: 4.8 E9/L (ref 1.8–7.3)
NEUTROPHILS RELATIVE PERCENT: 63 % (ref 43–80)
NITRITE, URINE: NEGATIVE
OPIATE SCREEN URINE: NOT DETECTED
OXYCODONE URINE: POSITIVE
PDW BLD-RTO: 16.5 FL (ref 11.5–15)
PH UA: 8 (ref 5–9)
PHENCYCLIDINE SCREEN URINE: NOT DETECTED
PHOSPHORUS: 7.7 MG/DL (ref 2.5–4.5)
PLATELET # BLD: 272 E9/L (ref 130–450)
PMV BLD AUTO: 9.8 FL (ref 7–12)
POTASSIUM SERPL-SCNC: 4.7 MMOL/L (ref 3.5–5)
PROTEIN UA: >=300 MG/DL
RBC # BLD: 3.19 E12/L (ref 3.5–5.5)
RBC UA: ABNORMAL /HPF (ref 0–2)
SODIUM BLD-SCNC: 140 MMOL/L (ref 132–146)
SPECIFIC GRAVITY UA: 1.02 (ref 1–1.03)
TOTAL PROTEIN: 5.5 G/DL (ref 6.4–8.3)
UROBILINOGEN, URINE: 0.2 E.U./DL
WBC # BLD: 7.6 E9/L (ref 4.5–11.5)
WBC UA: ABNORMAL /HPF (ref 0–5)

## 2020-11-01 PROCEDURE — 6370000000 HC RX 637 (ALT 250 FOR IP): Performed by: INTERNAL MEDICINE

## 2020-11-01 PROCEDURE — 86705 HEP B CORE ANTIBODY IGM: CPT

## 2020-11-01 PROCEDURE — 2140000000 HC CCU INTERMEDIATE R&B

## 2020-11-01 PROCEDURE — 90945 DIALYSIS ONE EVALUATION: CPT | Performed by: INTERNAL MEDICINE

## 2020-11-01 PROCEDURE — 6360000002 HC RX W HCPCS: Performed by: INTERNAL MEDICINE

## 2020-11-01 PROCEDURE — 99254 IP/OBS CNSLTJ NEW/EST MOD 60: CPT | Performed by: THORACIC SURGERY (CARDIOTHORACIC VASCULAR SURGERY)

## 2020-11-01 PROCEDURE — 81001 URINALYSIS AUTO W/SCOPE: CPT

## 2020-11-01 PROCEDURE — 36415 COLL VENOUS BLD VENIPUNCTURE: CPT

## 2020-11-01 PROCEDURE — 2580000003 HC RX 258: Performed by: INTERNAL MEDICINE

## 2020-11-01 PROCEDURE — 82962 GLUCOSE BLOOD TEST: CPT

## 2020-11-01 PROCEDURE — 99233 SBSQ HOSP IP/OBS HIGH 50: CPT | Performed by: INTERNAL MEDICINE

## 2020-11-01 PROCEDURE — 83735 ASSAY OF MAGNESIUM: CPT

## 2020-11-01 PROCEDURE — 80307 DRUG TEST PRSMV CHEM ANLYZR: CPT

## 2020-11-01 PROCEDURE — 87340 HEPATITIS B SURFACE AG IA: CPT

## 2020-11-01 PROCEDURE — 36410 VNPNXR 3YR/> PHY/QHP DX/THER: CPT

## 2020-11-01 PROCEDURE — 80053 COMPREHEN METABOLIC PANEL: CPT

## 2020-11-01 PROCEDURE — 86706 HEP B SURFACE ANTIBODY: CPT

## 2020-11-01 PROCEDURE — 05HA33Z INSERTION OF INFUSION DEVICE INTO LEFT BRACHIAL VEIN, PERCUTANEOUS APPROACH: ICD-10-PCS | Performed by: INTERNAL MEDICINE

## 2020-11-01 PROCEDURE — 84100 ASSAY OF PHOSPHORUS: CPT

## 2020-11-01 PROCEDURE — 85025 COMPLETE CBC W/AUTO DIFF WBC: CPT

## 2020-11-01 RX ORDER — OXYCODONE HYDROCHLORIDE AND ACETAMINOPHEN 5; 325 MG/1; MG/1
1 TABLET ORAL EVERY 6 HOURS PRN
Status: DISCONTINUED | OUTPATIENT
Start: 2020-11-01 | End: 2020-11-11 | Stop reason: HOSPADM

## 2020-11-01 RX ORDER — SODIUM CHLORIDE 0.9 % (FLUSH) 0.9 %
10 SYRINGE (ML) INJECTION PRN
Status: DISCONTINUED | OUTPATIENT
Start: 2020-11-01 | End: 2020-11-11 | Stop reason: HOSPADM

## 2020-11-01 RX ORDER — METOLAZONE 5 MG/1
5 TABLET ORAL DAILY
Status: DISCONTINUED | OUTPATIENT
Start: 2020-11-01 | End: 2020-11-11 | Stop reason: HOSPADM

## 2020-11-01 RX ORDER — HEPARIN SODIUM (PORCINE) LOCK FLUSH IV SOLN 100 UNIT/ML 100 UNIT/ML
1 SOLUTION INTRAVENOUS EVERY 12 HOURS SCHEDULED
Status: DISCONTINUED | OUTPATIENT
Start: 2020-11-01 | End: 2020-11-11 | Stop reason: HOSPADM

## 2020-11-01 RX ORDER — HEPARIN SODIUM (PORCINE) LOCK FLUSH IV SOLN 100 UNIT/ML 100 UNIT/ML
1 SOLUTION INTRAVENOUS PRN
Status: DISCONTINUED | OUTPATIENT
Start: 2020-11-01 | End: 2020-11-11 | Stop reason: HOSPADM

## 2020-11-01 RX ORDER — HYDRALAZINE HYDROCHLORIDE 20 MG/ML
10 INJECTION INTRAMUSCULAR; INTRAVENOUS ONCE
Status: COMPLETED | OUTPATIENT
Start: 2020-11-01 | End: 2020-11-01

## 2020-11-01 RX ORDER — OXYCODONE HYDROCHLORIDE AND ACETAMINOPHEN 5; 325 MG/1; MG/1
0.5 TABLET ORAL EVERY 6 HOURS PRN
Status: DISCONTINUED | OUTPATIENT
Start: 2020-11-01 | End: 2020-11-11 | Stop reason: HOSPADM

## 2020-11-01 RX ORDER — LISINOPRIL 20 MG/1
20 TABLET ORAL DAILY
Status: DISCONTINUED | OUTPATIENT
Start: 2020-11-01 | End: 2020-11-03

## 2020-11-01 RX ORDER — BUMETANIDE 1 MG/1
2 TABLET ORAL 2 TIMES DAILY
Status: DISCONTINUED | OUTPATIENT
Start: 2020-11-01 | End: 2020-11-11 | Stop reason: HOSPADM

## 2020-11-01 RX ORDER — HYDRALAZINE HYDROCHLORIDE 25 MG/1
25 TABLET, FILM COATED ORAL EVERY 8 HOURS SCHEDULED
Status: DISCONTINUED | OUTPATIENT
Start: 2020-11-01 | End: 2020-11-11 | Stop reason: HOSPADM

## 2020-11-01 RX ORDER — DILTIAZEM HYDROCHLORIDE 240 MG/1
240 CAPSULE, COATED, EXTENDED RELEASE ORAL DAILY
Status: DISCONTINUED | OUTPATIENT
Start: 2020-11-01 | End: 2020-11-11 | Stop reason: HOSPADM

## 2020-11-01 RX ADMIN — SODIUM CHLORIDE, PRESERVATIVE FREE 10 ML: 5 INJECTION INTRAVENOUS at 17:08

## 2020-11-01 RX ADMIN — HEPARIN SODIUM 5000 UNITS: 10000 INJECTION INTRAVENOUS; SUBCUTANEOUS at 21:24

## 2020-11-01 RX ADMIN — ACETAMINOPHEN 650 MG: 325 TABLET ORAL at 08:56

## 2020-11-01 RX ADMIN — HYDRALAZINE HYDROCHLORIDE 25 MG: 25 TABLET, FILM COATED ORAL at 13:14

## 2020-11-01 RX ADMIN — ONDANSETRON 4 MG: 2 INJECTION INTRAMUSCULAR; INTRAVENOUS at 18:51

## 2020-11-01 RX ADMIN — INSULIN GLARGINE 10 UNITS: 100 INJECTION, SOLUTION SUBCUTANEOUS at 21:23

## 2020-11-01 RX ADMIN — OXYCODONE AND ACETAMINOPHEN 1 TABLET: 5; 325 TABLET ORAL at 17:11

## 2020-11-01 RX ADMIN — HEPARIN SODIUM 5000 UNITS: 10000 INJECTION INTRAVENOUS; SUBCUTANEOUS at 06:18

## 2020-11-01 RX ADMIN — HYDRALAZINE HYDROCHLORIDE 10 MG: 20 INJECTION, SOLUTION INTRAMUSCULAR; INTRAVENOUS at 17:08

## 2020-11-01 RX ADMIN — FAMOTIDINE 20 MG: 20 TABLET ORAL at 08:56

## 2020-11-01 RX ADMIN — GENTAMICIN SULFATE: 1 CREAM TOPICAL at 08:57

## 2020-11-01 RX ADMIN — HYDRALAZINE HYDROCHLORIDE 25 MG: 25 TABLET, FILM COATED ORAL at 21:24

## 2020-11-01 RX ADMIN — INSULIN LISPRO 2 UNITS: 100 INJECTION, SOLUTION INTRAVENOUS; SUBCUTANEOUS at 17:09

## 2020-11-01 RX ADMIN — SODIUM CHLORIDE, PRESERVATIVE FREE 10 ML: 5 INJECTION INTRAVENOUS at 18:51

## 2020-11-01 RX ADMIN — SODIUM CHLORIDE, PRESERVATIVE FREE 100 UNITS: 5 INJECTION INTRAVENOUS at 20:26

## 2020-11-01 RX ADMIN — METOLAZONE 5 MG: 5 TABLET ORAL at 11:10

## 2020-11-01 RX ADMIN — LISINOPRIL 20 MG: 20 TABLET ORAL at 11:10

## 2020-11-01 RX ADMIN — INSULIN LISPRO 2 UNITS: 100 INJECTION, SOLUTION INTRAVENOUS; SUBCUTANEOUS at 20:30

## 2020-11-01 RX ADMIN — INSULIN LISPRO 6 UNITS: 100 INJECTION, SOLUTION INTRAVENOUS; SUBCUTANEOUS at 11:12

## 2020-11-01 RX ADMIN — BUMETANIDE 2 MG: 1 TABLET ORAL at 10:36

## 2020-11-01 RX ADMIN — LEVOTHYROXINE SODIUM 50 MCG: 0.05 TABLET ORAL at 06:24

## 2020-11-01 RX ADMIN — GENTAMICIN SULFATE: 1 CREAM TOPICAL at 20:10

## 2020-11-01 RX ADMIN — LORAZEPAM 0.5 MG: 0.5 TABLET ORAL at 20:27

## 2020-11-01 RX ADMIN — BUMETANIDE 2 MG: 1 TABLET ORAL at 20:26

## 2020-11-01 RX ADMIN — HEPARIN SODIUM 5000 UNITS: 10000 INJECTION INTRAVENOUS; SUBCUTANEOUS at 13:14

## 2020-11-01 RX ADMIN — DILTIAZEM HYDROCHLORIDE 240 MG: 240 CAPSULE, EXTENDED RELEASE ORAL at 11:10

## 2020-11-01 RX ADMIN — Medication 10 ML: at 20:26

## 2020-11-01 ASSESSMENT — PAIN SCALES - GENERAL
PAINLEVEL_OUTOF10: 3
PAINLEVEL_OUTOF10: 7
PAINLEVEL_OUTOF10: 0
PAINLEVEL_OUTOF10: 0

## 2020-11-01 ASSESSMENT — PAIN DESCRIPTION - LOCATION
LOCATION: TEETH
LOCATION: TEETH

## 2020-11-01 ASSESSMENT — PAIN DESCRIPTION - PAIN TYPE
TYPE: ACUTE PAIN
TYPE: ACUTE PAIN

## 2020-11-01 ASSESSMENT — PAIN DESCRIPTION - FREQUENCY
FREQUENCY: INTERMITTENT
FREQUENCY: INTERMITTENT

## 2020-11-01 ASSESSMENT — PAIN DESCRIPTION - ORIENTATION: ORIENTATION: LEFT

## 2020-11-01 ASSESSMENT — PAIN DESCRIPTION - DESCRIPTORS
DESCRIPTORS: ACHING;SORE;DISCOMFORT
DESCRIPTORS: ACHING;SORE;DISCOMFORT

## 2020-11-01 NOTE — PROGRESS NOTES
[facility]  Internal Medicine Residency Program  Progress Note - House Team 1    Patient:  Cherlynn Severance 31 y.o. female MRN: [id number]     Date of Service: 11/1/2020     CC: Extremity fatigue and chest discomfort  Overnight events: Patient complained heartburn and toothache, gave GI cocktail and started Pepcid 20 mg twice daily p.o. Patient father reports anxiety of patient. Started Ativan 0.5 mg twice daily as needed which is her home med. Subjective     Patient seen and examined at bedside this AM.    Patient appears fatigued and ill-appearing. Continue to have toothache and upper sternal chest pain. Chest pain is sharp in nature, constant, 8/10, no relieving or exacerbating factors. She also reports diffuse abdominal pain but no diarrhea. Last BW was yesterday, soft stool. Denies fever, chills, SOB, cough, nausea, or vomiting. Objective     Physical Exam:  · Vitals: BP (!) 180/78   Pulse 99   Temp 97.3 °F (36.3 °C) (Temporal)   Resp 16   Ht 5' 4\" (1.626 m)   Wt 147 lb (66.7 kg)   LMP 07/01/2020   SpO2 93%   BMI 25.23 kg/m²     · I & O - 24hr: No intake/output data recorded. · General Appearance: alert, appears stated age, cooperative and fatigued  · HEENT:  Head: Normal, normocephalic, atraumatic. · Eye: PERRL, EMOI  · Neck: no adenopathy, supple, symmetrical, trachea midline and thyroid not enlarged, symmetric, no tenderness/mass/nodules  · Lung: clear to auscultation bilaterally and No respiratory distress  · Heart: normal rate, regular rhythm, S1 and S2 normal, 2/6 systolic murmurs noted  · Abdomen: soft, non-tender; bowel sounds normal; no masses,  no organomegaly and HD catheter in place  · Extremities:  extremities normal, atraumatic, no cyanosis or edema  · Musculokeletal: No joint swelling, no muscle tenderness. ROM normal in all joints of extremities.    · Neurologic: Mental status: Alert, oriented, thought content appropriate  Subject  Pertinent Labs & Imaging per thoracic surgeon.    -Cardiology consulted. -ID consulted. Appreciate Abx recs. 2.  Type 1 diabetes mellitus, uncontrolled   -On Lantus 12 unit daily, Humalog 5 unit pre-meal at home   -Currently on Lantus 10 units daily, and a medium dose sliding scale every 3 hours    3. ESRD 2/2 diabetic neuropathy on PD   -Nephrology consulted, appreciate recs   -Continue CCPD   -Continue bumex, metolazone, lisinopril, cardizem per nephrology   -EPO 5000 units 3 times week    4. Hypertension   -On clonidine 0.1 mg twice daily as needed at home     5. Chronic diarrhea most likely 2/2 diabetic enteropathy, microscopic colitis   -Concern for C. Diff at [facility], PCR positive but EIA negative   -C. diff negative this admission   -Discontinue vancomycin     6. Hypothyroidism   -Continue home med levothyroxine 50 mg daily    7. History of substance use    PT/OT evaluation: Not indicated at this time  DVT prophylaxis/ GI prophylaxis: Heparin/Pepcid  Disposition: Continue inpatient management    Uriel Barrow MD, PGY-1  Attending physician: Dr. Carmen Modi     Attending Physician Statement:  Enedina Muller M.D., F.A.C.P.     I have discussed the case, including pertinent history and exam findings with the resident/NP. I have seen and examined the patient and the key elements of the encounter have been performed by me. I agree with the resident ROS, PMHx, PSHx, meds reviewed and assessment, plan and orders as documented by the resident/NP    [facility] charts reviewed, including other providers notes, relevant labs and imaging. Bacteriemia staph epi (either due to chest access- mediport vs tessio- removed)  \"Endocarditis\", discussed indications for emergent surgery, OK to defer intervention for now  tricupsid vegetation? ?-- piror septic emboli to lung?    cxr--  patchy opacities in the bilateral lower lungs which could   represent atelectasis versus airspace disease process. Abx per ID    THIS is NOT tricupsid endocarditis-- this is R sided atrial clot  And clot on ? MEDIOPORT ? central venous catheter. -- although moderate tricuspid regurgitation--       Large irregular, 2cm x 1cm, mobile, echogenic mass attached to the right   atrial side of interatrial septum near SVC opening suggestive of   vegetation or thrombus.   Large, 1cm x 1cm, irregular echogenic mass attached to the tip of the central venous catheter suggestive of vegetation or thrombus   Cardiothorasic-- mediort has been removed in the interim   She will need repeat YUILSSA to asses for vegetation at which point we can decide abx vs AngioVac vs sternotomy. PD -developed Staph epidermidis bacteremia ( on 10/8 )  Vancomycin random level -- per ID-- dosed while in [place]-- she is peritoneal dialysis  Difficult IV access--picc? Line vs central line-- culture negative recently at     Now peripheral IV, redose vanco accordingly    HTN-- clonidine prn-- makes very little urine- fluid shifts with peritoneal dialysis  Anemia-- ACD  dm1-- ESRD- -peritoneal dialysis  BS uncontrolled, high risk of rapid progressive DKA-- +betahydro -- given insulin Q3  Low intensity +D5-10 drip  +AG 20 today-- changed to Q4 hours. +lantus 10 started last night. Chronic diarrhea   +PCR Cdif hx recently- continue oral vanco (+on 10/25-- will need 8-more days?)  Today more formed stool-- partially treated Cdif colitis- started treatment at [address] [place] [address]. Hypothyroid- IV vs PO  >50% of time spent coordinating care with other providers and/or counseling patient/family  Remainder of medical problems as per resident note.

## 2020-11-01 NOTE — CONSULTS
Yasir Florence MD   5,000 Units at 11/01/20 0618    levothyroxine (SYNTHROID) tablet 50 mcg  50 mcg Oral Daily Evelin Hopkins MD   50 mcg at 11/01/20 0624    glucose (GLUTOSE) 40 % oral gel 15 g  15 g Oral PRN Evelin Hopkins MD        dextrose 50 % IV solution  12.5 g Intravenous PRN Evelin Hopkins MD        glucagon (rDNA) injection 1 mg  1 mg Intramuscular PRN Evelin Hopkins MD        dextrose 5 % solution  100 mL/hr Intravenous PRN Evelin Hopkins MD        insulin glargine (LANTUS) injection vial 10 Units  10 Units Subcutaneous Nightly Fanny Vines MD   10 Units at 10/31/20 2131    dextrose 10 % infusion   Intravenous Continuous Fanny Vines MD 50 mL/hr at 10/31/20 1200      vancomycin (VANCOCIN) oral solution 125 mg  125 mg Oral 4 times per day Fanny Vines MD   125 mg at 11/01/20 4728    insulin lispro (HUMALOG) injection vial 0-12 Units  0-12 Units Subcutaneous Q3H (SCHEDULED) Fanny Vines MD   4 Units at 10/31/20 1942    gentamicin (GARAMYCIN) 0.1 % cream   Topical Daily Joe Middleton MD        [START ON 11/2/2020] epoetin nena-epbx (RETACRIT) injection 5,000 Units  5,000 Units Subcutaneous Once per day on Mon Wed Fri Joe Middleton MD        famotidine (PEPCID) tablet 20 mg  20 mg Oral Daily Lonnie Cadet MD   20 mg at 10/31/20 1615    LORazepam (ATIVAN) tablet 0.5 mg  0.5 mg Oral BID PRN Lonnie Cadet MD   0.5 mg at 10/31/20 2126       Past Medical History:  Past Medical History:   Diagnosis Date    [facility] failure ([facility]     BC (acute kidney injury) ([facility] 10/1/2019    Cephalgia 10/9/2019    Chronic kidney disease     Depression     Diabetes mellitus ([facility]     Diabetic ketoacidosis ([facility] 8/27/2011    [facility]     History of blood transfusion 11/2019    Hyperlipidemia 10/8/2020    Hypothyroidism 10/8/2020    Iron deficiency anemia 10/1/2019    MDRO (multiple drug resistant organisms) resistance     MRSA Never true     Inability: Never true    Transportation needs     Medical: No     Non-medical: No   Tobacco Use    Smoking status: Current Every Day Smoker     Packs/day: 0.50     Years: 6.00     Pack years: 3.00     Types: Cigarettes    Smokeless tobacco: Current User   Substance and Sexual Activity    Alcohol use: No    Drug use: No     Comment: documented prior history of opioid abuse    Sexual activity: Yes     Partners: Male   Lifestyle    Physical activity     Days per week: Not on file     Minutes per session: Not on file    Stress: Not on file   Relationships    Social connections     Talks on phone: Not on file     Gets together: Not on file     Attends Religion service: Not on file     Active member of club or organization: Not on file     Attends meetings of clubs or organizations: Not on file     Relationship status: Not on file    Intimate partner violence     Fear of current or ex partner: Not on file     Emotionally abused: Not on file     Physically abused: Not on file     Forced sexual activity: Not on file   Other Topics Concern    Not on file   Social History Narrative    Not on file       Family History:  Family History   Problem Relation Age of Onset    Asthma Mother     Hypertension Mother     High Blood Pressure Mother     Diabetes Mother     Asthma Brother     High Blood Pressure Father        Review of Systems:  Constitutional: Denies fevers, chills, or weight loss. HEENT: Denies visual changes or hearing loss. Heart: As per HPI. Lungs: Denies shortness of breath, cough, or wheezing. Gastrointestinal: Denies nausea, vomiting, constipation, or diarrhea. Genitourinary: dysuria or hematuria. Psychiatric: Patient denies anxiety or depression. Neurologic: Patient denies weakness of the extremities, dizziness, or headaches. All other ROS checked and found to be negative.     Labs:  Recent Labs     10/31/20  0310 11/01/20  0557   WBC 8.0 7.6   HGB 9.9* 9.5*   HCT 32.1* 30.6*    272      Recent Labs     10/31/20  1557 10/31/20  2156 11/01/20  0557   BUN 75* 70* 64*   CREATININE 7.7* 7.9* 7.5*       Objective:  Vitals BP (!) 180/78   Pulse 99   Temp 97.3 °F (36.3 °C) (Temporal)   Resp 16   Ht 5' 4\" (1.626 m)   Wt 147 lb (66.7 kg)   LMP 07/01/2020   SpO2 93%   BMI 25.23 kg/m²   General Appearance: Pleasant 31y.o. year old female who appears stated age. Communicates well, no acute distress. HEENT: Head is normocephalic, atraumatic. EOMs intact, PERRL. Trachea midline. Lungs: Normal respiratory rate and normal effort. She is not in respiratory distress. Breath sounds clear to auscultation. No wheezes. Heart: Normal rate. Regular rhythm. S1 normal and S2 normal. Positive for murmur. Chest: Symmetric chest wall expansion. Extremities: Normal range of motion. Neurological: Patient is alert and oriented to person, place and time. Patient has normal reflexes. Skin: Warm and dry. Abdomen: Abdomen is soft and non-distended. Bowel sounds are normal. There is no abdominal tenderness tenderness. There is no guarding. There is no mass. Pulses: Distal pulses are intact. Skin: Warm and dry without lesions. YULISSA    Procedure Date      10/19/2020      Corporate ID                       Ordering Physician  Colonel Chong MASON      Accession Number  [id number]       Referring Physician      Date of Birth     1990       Sonographer         Bernabe Love [facility]      Age               31 year(s)       Interpreting        Jah Chowdary MD                                         Any Other     Procedure     Type of Study      YULISSA procedure:Transesophageal Echo (YULISSA), Color Flow Velocity Mapping.      Procedure Date  Date: 10/19/2020 Start: 02:53 PM     Study Location: Portable  Technical Quality: Adequate visualization     Indications:R/O Endocarditis.     Patient Status: Routine     Height: 64 inches Weight: 147 pounds BSA: 1.72 m^2 BMI: 25.23 kg/m^2     BP: 133/90 mmHg     YULISSA Performed By: the attending and the sonographer      Type of Anesthesia: General anesthesia      Findings      Left Ventricle   Normal left ventricular chamber size. Normal left ventricular systolic function. Right Ventricle   Normal right ventricle size and function. Left Atrium   Left atrium is of normal size. Interatrial septum intact. Agitated saline injection showed no right to left shunt. Right Atrium   Large irregular, 2cm x 1cm, mobile, echogenic mass attached to the right   atrial side of interatrial septum near SVC opening suggestive of   vegetation or thrombus. Large 1cm x 1cm, irregular echogenic mass attached to the tip of the   central venous catheter suggestive of vegetation or thrombus. Mitral Valve   There is mild tricuspid regurgitation. Posterior mitral leaflet prolapse. No valvular vegetations. Tricuspid Valve   Normal tricuspid valve structure. There is moderate tricuspid regurgitation. No valvular vegetations noted. Aortic Valve   Normal aortic valve structure and function. No valvular vegetations. Pulmonic Valve   The pulmonic valve was not well visualized. Pericardial Effusion   No evidence of pericardial effusion. Pericardium appears normal.      Pleural Effusion   No evidence of pleural effusion. Aorta   Normal aortic root size and ascending aorta. Conclusions      Summary   Large irregular, 2cm x 1cm, mobile, echogenic mass attached to the right   atrial side of interatrial septum near SVC opening suggestive of   vegetation or thrombus. Large, 1cm x 1cm, irregular echogenic mass attached to the tip of the   central venous catheter suggestive of vegetation or thrombus. Normal left ventricular systolic function. Interatrial septum intact. Agitated saline injection showed no right to left shunt. Normal right ventricle size and function.

## 2020-11-01 NOTE — PROGRESS NOTES
Perfect served Internal med concerning Ativan PO since patient does not have a line. One more nurse will try to put an IV site. Discussed possible need for a central line.
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
## 2020-11-01 NOTE — PROGRESS NOTES
Power midline  Placement 11/1/2020    Product number: [id number]   Lot Number: [id number]      Ultrasound: yes   Left brachial                Upper Arm Circumference: 26cm    Size: 4.5frsl    Exposed Length: 3cm    Internal Length: 12cm   Cut: 0cm   Vein Measurement: 0.55cm    Tosha Hamilton  11/1/2020  2:54 PM

## 2020-11-01 NOTE — PROGRESS NOTES
mL  10 mL Intravenous PRN Keyonna Abreu MD        acetaminophen (TYLENOL) tablet 650 mg  650 mg Oral Q6H PRN Keyonna Abreu MD   650 mg at 11/01/20 0856    Or    acetaminophen (TYLENOL) suppository 650 mg  650 mg Rectal Q6H PRN Keyonna Abreu MD        polyethylene glycol (GLYCOLAX) packet 17 g  17 g Oral Daily PRN Keyonna Abreu MD        promethazine (PHENERGAN) tablet 12.5 mg  12.5 mg Oral Q6H PRN Keyonna Abreu MD        Or    ondansetron (ZOFRAN) injection 4 mg  4 mg Intravenous Q6H PRN Keyonna Abreu MD   4 mg at 10/31/20 0810    heparin (porcine) injection 5,000 Units  5,000 Units Subcutaneous 3 times per day Keyonna Abreu MD   5,000 Units at 11/01/20 0618    levothyroxine (SYNTHROID) tablet 50 mcg  50 mcg Oral Daily Keyonna Abreu MD   50 mcg at 11/01/20 0624    glucose (GLUTOSE) 40 % oral gel 15 g  15 g Oral PRN Keyonna Abreu MD        dextrose 50 % IV solution  12.5 g Intravenous PRN Keyonna Abreu MD        glucagon (rDNA) injection 1 mg  1 mg Intramuscular PRN Keyonna Abreu MD        dextrose 5 % solution  100 mL/hr Intravenous PRN Keyonna Abreu MD        insulin glargine (LANTUS) injection vial 10 Units  10 Units Subcutaneous Nightly Blanquita Cardozo MD   10 Units at 10/31/20 2131    gentamicin (GARAMYCIN) 0.1 % cream   Topical Daily Joe Gonzalez MD        [START ON 11/2/2020] epoetin nena-epbx (RETACRIT) injection 5,000 Units  5,000 Units Subcutaneous Once per day on Mon Wed Fri Joe Gonzalez MD        famotidine (PEPCID) tablet 20 mg  20 mg Oral Daily Kerry Logan MD   20 mg at 11/01/20 3771    LORazepam (ATIVAN) tablet 0.5 mg  0.5 mg Oral BID PRN Kerry Logan MD   0.5 mg at 10/31/20 7616       REVIEW OF SYSTEMS:    CONSTITUTIONAL:  Denies fever, chill or rigors  HEENT: denies blurring of vision or double vision, denies hearing problem  RESPIRATORY: Denies SOB   CARDIOVASCULAR:  Reports chest pain   GASTROINTESTINAL:  Denies abdomen pain, diarrhea or constipation. GENITOURINARY:  Denies burning urination or frequency of urination  INTEGUMENT: denies wound , rash  HEMATOLOGIC/LYMPHATIC:  Denies lymph node swelling, gum bleeding or easy bruising. MUSCULOSKELETAL:  Denies leg pain , joint pain , joint swelling  NEUROLOGICAL:  Weakness            PHYSICAL EXAM:      Vitals:     BP (!) 186/108 Comment: manual  Pulse 105   Temp 97.7 °F (36.5 °C) (Temporal)   Resp 16   Ht 5' 4\" (1.626 m)   Wt 146 lb 9.6 oz (66.5 kg) Comment: EMPTY- after PD  LMP 07/01/2020   SpO2 92%   BMI 25.16 kg/m²       General Appearance:    Awake and alert, no distress    Head:    Normocephalic, atraumatic   Eyes:    No pallor, no icterus,   Ears:    No obvious deformity or drainage.    Nose:   No nasal drainage   Throat:   Mucosa moist, no oral thrush   Neck:   Supple, no lymphadenopathy   Lungs:     Clear to auscultation bilaterally, no wheeze    Heart:    Tachycardic, systolic murmur +   Abdomen:     Soft, non-tender, bowel sounds present, PD catheter in place    Extremities:   No edema, no cyanosis   Pulses:   Dorsalis pedis palpable    Skin:   no rashes or lesions       DATA:      CBC with Differential:      Lab Results   Component Value Date    WBC 7.6 11/01/2020    RBC 3.19 11/01/2020    HGB 9.5 11/01/2020    HCT 30.6 11/01/2020     11/01/2020    MCV 95.9 11/01/2020    MCH 29.8 11/01/2020    MCHC 31.0 11/01/2020    RDW 16.5 11/01/2020    NRBC 0.9 08/10/2020    SEGSPCT 67 06/27/2013    METASPCT 1.7 08/10/2020    LYMPHOPCT 22.5 11/01/2020    MONOPCT 6.6 11/01/2020    BASOPCT 1.6 11/01/2020    MONOSABS 0.50 11/01/2020    LYMPHSABS 1.71 11/01/2020    EOSABS 0.47 11/01/2020    BASOSABS 0.12 11/01/2020       CMP     Lab Results   Component Value Date     11/01/2020    K 4.7 11/01/2020    K 3.7 10/11/2020    CL 99 11/01/2020    CO2 21 11/01/2020    BUN 64 11/01/2020    CREATININE 7.5 11/01/2020    GFRAA 8 11/01/2020    LABGLOM 8 11/01/2020    GLUCOSE 93 11/01/2020    GLUCOSE 130 05/18/2012    PROT 5.5 11/01/2020    LABALBU 3.1 11/01/2020    LABALBU 4.1 05/18/2012    CALCIUM 9.1 11/01/2020    BILITOT 0.3 11/01/2020    ALKPHOS 119 11/01/2020    AST 31 11/01/2020    ALT 54 11/01/2020         Hepatic Function Panel:    Lab Results   Component Value Date    ALKPHOS 119 11/01/2020    ALT 54 11/01/2020    AST 31 11/01/2020    PROT 5.5 11/01/2020    BILITOT 0.3 11/01/2020    BILIDIR <0.2 10/11/2020    IBILI see below 10/11/2020    LABALBU 3.1 11/01/2020    LABALBU 4.1 05/18/2012       PT/INR:    Lab Results   Component Value Date    PROTIME 10.6 10/31/2020    PROTIME 13.6 08/14/2011    INR 1.0 10/31/2020       TSH:    Lab Results   Component Value Date    TSH 6.000 10/31/2020       U/A:    Lab Results   Component Value Date    COLORU Yellow 11/01/2020    PHUR 8.0 11/01/2020    LABCAST RARE 01/12/2020    WBCUA NONE 11/01/2020    WBCUA 0-1 05/18/2012    RBCUA 2-5 11/01/2020    RBCUA 1-3 08/01/2013    MUCUS Present 02/12/2016    TRICHOMONAS Present 07/17/2020    YEAST RARE 05/24/2018    BACTERIA FEW 11/01/2020    CLARITYU Clear 11/01/2020    SPECGRAV 1.020 11/01/2020    LEUKOCYTESUR Negative 11/01/2020    UROBILINOGEN 0.2 11/01/2020    BILIRUBINUR Negative 11/01/2020    BILIRUBINUR SMALL 05/18/2012    BLOODU SMALL 11/01/2020    GLUCOSEU 250 11/01/2020    GLUCOSEU >=1000 05/18/2012    AMORPHOUS RARE 11/01/2019       ABG:    Lab Results   Component Value Date    FQC2GGC 22.1 10/29/2019    A8VXTWGX 97.7 09/08/2012    PHART 7.345 07/20/2012    XTB2LJH 35.0 10/29/2019    PO2ART 91.5 10/29/2019       MICROBIOLOGY:    Blood culture - Staph epidermidis  ( 10/8)     Vanco random level 22.9    YULISSA -     Large irregular, 2cm x 1cm, mobile, echogenic mass attached to the right   atrial side of interatrial septum near SVC opening suggestive of   vegetation or thrombus.    Large, 1cm x 1cm, irregular echogenic mass attached to the tip of the   central venous catheter suggestive of vegetation or thrombus. Normal left ventricular systolic function. Interatrial septum intact. Agitated saline injection showed no right to left shunt. Normal right ventricle size and function. There is mild tricuspid regurgitation. Posterior mitral leaflet prolapse. There is moderate tricuspid regurgitation. Normal aortic root size. No evidence of pericardial effusion. Pericardium appears normal.   Technically sub-optimal images. Compared to prior TTE from 1/13/2020. IMPRESSION:     1. Staph epidermidis bacteremia - endocarditis - pt left [facility]  AMA   2. C diff infection ( at [facility] on 10/25)     RECOMMENDATIONS:      1. Vancomycin IV PRN   2. Oral vancomycin 250 mg po q 6 hrs   3.  YULISSA / Midline
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
## 2020-11-01 NOTE — FLOWSHEET NOTE
11/01/20 0715   Vitals   BP (!) 178/88   Temp 97.4 °F (36.3 °C)   Pulse 107   Resp 18   Weight 146 lb 13.2 oz (66.6 kg)   Peritoneal Dialysis Catheter Left lower abdomen   Placement Date/Time: 10/31/20 0704   Catheter Location: Left lower abdomen   Status Deaccessed   Site Condition No Complications   Dressing Status Clean;Dry; Intact   Dressing Occlusive   Cycler   Ultrafiltration (UF) (mL) 1378 mL   CCPD  Completed at this time using aseptic technique. Clear yellow effluent noted.

## 2020-11-02 ENCOUNTER — APPOINTMENT (OUTPATIENT)
Dept: CARDIAC CATH/INVASIVE PROCEDURES | Age: 30
DRG: 721 | End: 2020-11-02
Payer: COMMERCIAL

## 2020-11-02 ENCOUNTER — ANESTHESIA (OUTPATIENT)
Dept: CARDIAC CATH/INVASIVE PROCEDURES | Age: 30
DRG: 721 | End: 2020-11-02
Payer: COMMERCIAL

## 2020-11-02 ENCOUNTER — ANESTHESIA EVENT (OUTPATIENT)
Dept: CARDIAC CATH/INVASIVE PROCEDURES | Age: 30
DRG: 721 | End: 2020-11-02
Payer: COMMERCIAL

## 2020-11-02 VITALS
DIASTOLIC BLOOD PRESSURE: 68 MMHG | OXYGEN SATURATION: 94 % | SYSTOLIC BLOOD PRESSURE: 119 MMHG | RESPIRATION RATE: 13 BRPM

## 2020-11-02 LAB
ALBUMIN SERPL-MCNC: 2.8 G/DL (ref 3.5–5.2)
ALP BLD-CCNC: 109 U/L (ref 35–104)
ALT SERPL-CCNC: 45 U/L (ref 0–32)
ANION GAP SERPL CALCULATED.3IONS-SCNC: 22 MMOL/L (ref 7–16)
AST SERPL-CCNC: 33 U/L (ref 0–31)
BASOPHILS ABSOLUTE: 0.1 E9/L (ref 0–0.2)
BASOPHILS RELATIVE PERCENT: 1.7 % (ref 0–2)
BILIRUB SERPL-MCNC: <0.2 MG/DL (ref 0–1.2)
BUN BLDV-MCNC: 56 MG/DL (ref 6–20)
CALCIUM SERPL-MCNC: 8.6 MG/DL (ref 8.6–10.2)
CHLORIDE BLD-SCNC: 102 MMOL/L (ref 98–107)
CO2: 20 MMOL/L (ref 22–29)
CREAT SERPL-MCNC: 7.7 MG/DL (ref 0.5–1)
EOSINOPHILS ABSOLUTE: 0.66 E9/L (ref 0.05–0.5)
EOSINOPHILS RELATIVE PERCENT: 11 % (ref 0–6)
GFR AFRICAN AMERICAN: 8
GFR NON-AFRICAN AMERICAN: 8 ML/MIN/1.73
GLUCOSE BLD-MCNC: 131 MG/DL (ref 74–99)
HBV SURFACE AB TITR SER: REACTIVE {TITER}
HCT VFR BLD CALC: 31 % (ref 34–48)
HEMOGLOBIN: 9.7 G/DL (ref 11.5–15.5)
HEPATITIS B CORE IGM ANTIBODY: NORMAL
HEPATITIS B SURFACE ANTIGEN INTERPRETATION: NORMAL
HIV-1 AND HIV-2 ANTIBODIES: NORMAL
IMMATURE GRANULOCYTES #: 0.01 E9/L
IMMATURE GRANULOCYTES %: 0.2 % (ref 0–5)
LV EF: 63 %
LVEF MODALITY: NORMAL
LYMPHOCYTES ABSOLUTE: 1.81 E9/L (ref 1.5–4)
LYMPHOCYTES RELATIVE PERCENT: 30.2 % (ref 20–42)
MAGNESIUM: 2.3 MG/DL (ref 1.6–2.6)
MCH RBC QN AUTO: 29.8 PG (ref 26–35)
MCHC RBC AUTO-ENTMCNC: 31.3 % (ref 32–34.5)
MCV RBC AUTO: 95.4 FL (ref 80–99.9)
METER GLUCOSE: 110 MG/DL (ref 74–99)
METER GLUCOSE: 132 MG/DL (ref 74–99)
METER GLUCOSE: 188 MG/DL (ref 74–99)
METER GLUCOSE: 219 MG/DL (ref 74–99)
METER GLUCOSE: 279 MG/DL (ref 74–99)
METER GLUCOSE: 62 MG/DL (ref 74–99)
MONOCYTES ABSOLUTE: 0.45 E9/L (ref 0.1–0.95)
MONOCYTES RELATIVE PERCENT: 7.5 % (ref 2–12)
NEUTROPHILS ABSOLUTE: 2.96 E9/L (ref 1.8–7.3)
NEUTROPHILS RELATIVE PERCENT: 49.4 % (ref 43–80)
PDW BLD-RTO: 16.4 FL (ref 11.5–15)
PHOSPHORUS: 9.1 MG/DL (ref 2.5–4.5)
PLATELET # BLD: 261 E9/L (ref 130–450)
PMV BLD AUTO: 9.9 FL (ref 7–12)
POTASSIUM SERPL-SCNC: 4.4 MMOL/L (ref 3.5–5)
RBC # BLD: 3.25 E12/L (ref 3.5–5.5)
SODIUM BLD-SCNC: 144 MMOL/L (ref 132–146)
TOTAL PROTEIN: 4.9 G/DL (ref 6.4–8.3)
WBC # BLD: 6 E9/L (ref 4.5–11.5)

## 2020-11-02 PROCEDURE — 6370000000 HC RX 637 (ALT 250 FOR IP): Performed by: INTERNAL MEDICINE

## 2020-11-02 PROCEDURE — B24BZZ4 ULTRASONOGRAPHY OF HEART WITH AORTA, TRANSESOPHAGEAL: ICD-10-PCS | Performed by: INTERNAL MEDICINE

## 2020-11-02 PROCEDURE — 93312 ECHO TRANSESOPHAGEAL: CPT | Performed by: INTERNAL MEDICINE

## 2020-11-02 PROCEDURE — 6360000002 HC RX W HCPCS: Performed by: INTERNAL MEDICINE

## 2020-11-02 PROCEDURE — 93312 ECHO TRANSESOPHAGEAL: CPT

## 2020-11-02 PROCEDURE — 93321 DOPPLER ECHO F-UP/LMTD STD: CPT

## 2020-11-02 PROCEDURE — 83735 ASSAY OF MAGNESIUM: CPT

## 2020-11-02 PROCEDURE — 85025 COMPLETE CBC W/AUTO DIFF WBC: CPT

## 2020-11-02 PROCEDURE — 93325 DOPPLER ECHO COLOR FLOW MAPG: CPT

## 2020-11-02 PROCEDURE — 84100 ASSAY OF PHOSPHORUS: CPT

## 2020-11-02 PROCEDURE — 82962 GLUCOSE BLOOD TEST: CPT

## 2020-11-02 PROCEDURE — 3700000000 HC ANESTHESIA ATTENDED CARE

## 2020-11-02 PROCEDURE — 2580000003 HC RX 258: Performed by: INTERNAL MEDICINE

## 2020-11-02 PROCEDURE — 3700000001 HC ADD 15 MINUTES (ANESTHESIA)

## 2020-11-02 PROCEDURE — 2709999900 HC NON-CHARGEABLE SUPPLY

## 2020-11-02 PROCEDURE — 2140000000 HC CCU INTERMEDIATE R&B

## 2020-11-02 PROCEDURE — 93325 DOPPLER ECHO COLOR FLOW MAPG: CPT | Performed by: INTERNAL MEDICINE

## 2020-11-02 PROCEDURE — 36415 COLL VENOUS BLD VENIPUNCTURE: CPT

## 2020-11-02 PROCEDURE — 99233 SBSQ HOSP IP/OBS HIGH 50: CPT | Performed by: INTERNAL MEDICINE

## 2020-11-02 PROCEDURE — 80053 COMPREHEN METABOLIC PANEL: CPT

## 2020-11-02 PROCEDURE — 6360000002 HC RX W HCPCS

## 2020-11-02 PROCEDURE — 90945 DIALYSIS ONE EVALUATION: CPT | Performed by: INTERNAL MEDICINE

## 2020-11-02 PROCEDURE — 2580000003 HC RX 258

## 2020-11-02 PROCEDURE — 93320 DOPPLER ECHO COMPLETE: CPT | Performed by: INTERNAL MEDICINE

## 2020-11-02 RX ORDER — LEVOTHYROXINE SODIUM 0.07 MG/1
75 TABLET ORAL
Status: DISCONTINUED | OUTPATIENT
Start: 2020-11-03 | End: 2020-11-11 | Stop reason: HOSPADM

## 2020-11-02 RX ORDER — PROPOFOL 10 MG/ML
INJECTION, EMULSION INTRAVENOUS PRN
Status: DISCONTINUED | OUTPATIENT
Start: 2020-11-02 | End: 2020-11-02 | Stop reason: SDUPTHER

## 2020-11-02 RX ORDER — SODIUM CHLORIDE 9 MG/ML
INJECTION, SOLUTION INTRAVENOUS CONTINUOUS PRN
Status: DISCONTINUED | OUTPATIENT
Start: 2020-11-02 | End: 2020-11-02 | Stop reason: SDUPTHER

## 2020-11-02 RX ORDER — LABETALOL HYDROCHLORIDE 5 MG/ML
5 INJECTION, SOLUTION INTRAVENOUS EVERY 6 HOURS PRN
Status: DISCONTINUED | OUTPATIENT
Start: 2020-11-02 | End: 2020-11-04

## 2020-11-02 RX ADMIN — SODIUM CHLORIDE, PRESERVATIVE FREE 100 UNITS: 5 INJECTION INTRAVENOUS at 09:10

## 2020-11-02 RX ADMIN — INSULIN GLARGINE 10 UNITS: 100 INJECTION, SOLUTION SUBCUTANEOUS at 20:57

## 2020-11-02 RX ADMIN — SODIUM CHLORIDE, PRESERVATIVE FREE 100 UNITS: 5 INJECTION INTRAVENOUS at 20:58

## 2020-11-02 RX ADMIN — LEVOTHYROXINE SODIUM 50 MCG: 0.05 TABLET ORAL at 06:28

## 2020-11-02 RX ADMIN — DILTIAZEM HYDROCHLORIDE 240 MG: 240 CAPSULE, EXTENDED RELEASE ORAL at 09:11

## 2020-11-02 RX ADMIN — HYDRALAZINE HYDROCHLORIDE 25 MG: 25 TABLET, FILM COATED ORAL at 20:58

## 2020-11-02 RX ADMIN — INSULIN LISPRO 6 UNITS: 100 INJECTION, SOLUTION INTRAVENOUS; SUBCUTANEOUS at 17:10

## 2020-11-02 RX ADMIN — BUMETANIDE 2 MG: 1 TABLET ORAL at 20:58

## 2020-11-02 RX ADMIN — OXYCODONE AND ACETAMINOPHEN 1 TABLET: 5; 325 TABLET ORAL at 19:03

## 2020-11-02 RX ADMIN — ACETAMINOPHEN 650 MG: 325 TABLET ORAL at 20:57

## 2020-11-02 RX ADMIN — HEPARIN SODIUM 5000 UNITS: 10000 INJECTION INTRAVENOUS; SUBCUTANEOUS at 20:58

## 2020-11-02 RX ADMIN — LORAZEPAM 0.5 MG: 0.5 TABLET ORAL at 20:58

## 2020-11-02 RX ADMIN — Medication 10 ML: at 09:10

## 2020-11-02 RX ADMIN — FAMOTIDINE 20 MG: 20 TABLET ORAL at 09:11

## 2020-11-02 RX ADMIN — SODIUM CHLORIDE: 9 INJECTION, SOLUTION INTRAVENOUS at 10:22

## 2020-11-02 RX ADMIN — HEPARIN SODIUM 5000 UNITS: 10000 INJECTION INTRAVENOUS; SUBCUTANEOUS at 14:32

## 2020-11-02 RX ADMIN — BUMETANIDE 2 MG: 1 TABLET ORAL at 09:11

## 2020-11-02 RX ADMIN — PROPOFOL 300 MG: 10 INJECTION, EMULSION INTRAVENOUS at 10:43

## 2020-11-02 RX ADMIN — HYDRALAZINE HYDROCHLORIDE 25 MG: 25 TABLET, FILM COATED ORAL at 06:26

## 2020-11-02 RX ADMIN — OXYCODONE AND ACETAMINOPHEN 1 TABLET: 5; 325 TABLET ORAL at 12:25

## 2020-11-02 RX ADMIN — METOLAZONE 5 MG: 5 TABLET ORAL at 09:11

## 2020-11-02 RX ADMIN — HEPARIN SODIUM 5000 UNITS: 10000 INJECTION INTRAVENOUS; SUBCUTANEOUS at 06:25

## 2020-11-02 RX ADMIN — Medication 10 ML: at 20:57

## 2020-11-02 RX ADMIN — VANCOMYCIN HYDROCHLORIDE 1000 MG: 1 INJECTION, POWDER, LYOPHILIZED, FOR SOLUTION INTRAVENOUS at 16:27

## 2020-11-02 RX ADMIN — HYDRALAZINE HYDROCHLORIDE 25 MG: 25 TABLET, FILM COATED ORAL at 14:32

## 2020-11-02 RX ADMIN — INSULIN LISPRO 4 UNITS: 100 INJECTION, SOLUTION INTRAVENOUS; SUBCUTANEOUS at 20:57

## 2020-11-02 RX ADMIN — LISINOPRIL 20 MG: 20 TABLET ORAL at 09:11

## 2020-11-02 RX ADMIN — EPOETIN ALFA-EPBX 5000 UNITS: 10000 INJECTION, SOLUTION INTRAVENOUS; SUBCUTANEOUS at 09:10

## 2020-11-02 ASSESSMENT — PAIN SCALES - GENERAL
PAINLEVEL_OUTOF10: 0
PAINLEVEL_OUTOF10: 10
PAINLEVEL_OUTOF10: 9
PAINLEVEL_OUTOF10: 8
PAINLEVEL_OUTOF10: 10

## 2020-11-02 ASSESSMENT — PAIN DESCRIPTION - LOCATION
LOCATION: HEAD
LOCATION: HEAD

## 2020-11-02 ASSESSMENT — PAIN DESCRIPTION - ONSET
ONSET: ON-GOING
ONSET: GRADUAL

## 2020-11-02 ASSESSMENT — PAIN DESCRIPTION - PAIN TYPE
TYPE: ACUTE PAIN
TYPE: ACUTE PAIN

## 2020-11-02 ASSESSMENT — PAIN DESCRIPTION - DESCRIPTORS
DESCRIPTORS: ACHING;HEADACHE;DISCOMFORT
DESCRIPTORS: ACHING;HEADACHE;DISCOMFORT

## 2020-11-02 ASSESSMENT — PAIN DESCRIPTION - FREQUENCY
FREQUENCY: CONTINUOUS
FREQUENCY: CONTINUOUS

## 2020-11-02 ASSESSMENT — PAIN DESCRIPTION - PROGRESSION
CLINICAL_PROGRESSION: RAPIDLY WORSENING
CLINICAL_PROGRESSION: RAPIDLY WORSENING

## 2020-11-02 ASSESSMENT — ENCOUNTER SYMPTOMS: SHORTNESS OF BREATH: 0

## 2020-11-02 ASSESSMENT — PAIN DESCRIPTION - ORIENTATION
ORIENTATION: ANTERIOR
ORIENTATION: ANTERIOR

## 2020-11-02 ASSESSMENT — LIFESTYLE VARIABLES: SMOKING_STATUS: 1

## 2020-11-02 NOTE — PROGRESS NOTES
Department of Internal Medicine  Infectious Diseases  Progress Note      C/C :   Endocarditis     Pt is awake and alert   Denies fever or chills  Reports body ache  Afebrile     Current Facility-Administered Medications   Medication Dose Route Frequency Provider Last Rate Last Dose    dilTIAZem (CARDIZEM CD) extended release capsule 240 mg  240 mg Oral Daily Elma Singletary MD   240 mg at 11/02/20 0911    bumetanide (BUMEX) tablet 2 mg  2 mg Oral BID Elma Singletary MD   2 mg at 11/02/20 0911    metOLazone (ZAROXOLYN) tablet 5 mg  5 mg Oral Daily Elma Singletary MD   5 mg at 11/02/20 0911    lisinopril (PRINIVIL;ZESTRIL) tablet 20 mg  20 mg Oral Daily Elma Singletary MD   20 mg at 11/02/20 0911    insulin lispro (HUMALOG) injection vial 0-12 Units  0-12 Units Subcutaneous Q4H Elma Singletary MD   2 Units at 11/01/20 2030    oxyCODONE-acetaminophen (PERCOCET) 5-325 MG per tablet 0.5 tablet  0.5 tablet Oral Q6H PRN Elma Singletary MD        Or    oxyCODONE-acetaminophen (PERCOCET) 5-325 MG per tablet 1 tablet  1 tablet Oral Q6H PRN Elma Singletary MD   1 tablet at 11/02/20 1225    hydrALAZINE (APRESOLINE) tablet 25 mg  25 mg Oral 3 times per day Ghazal Sanderson MD   25 mg at 11/02/20 0626    lidocaine 1 % injection 5 mL  5 mL Intradermal Once Ishan Dee MD        sodium chloride flush 0.9 % injection 10 mL  10 mL Intravenous PRN Ishan Dee MD   10 mL at 11/01/20 1708    heparin flush 100 UNIT/ML injection 100 Units  1 mL Intravenous 2 times per day Mayte Blakely MD   100 Units at 11/02/20 0910    heparin flush 100 UNIT/ML injection 100 Units  1 mL Intracatheter PRN Ishan Dee MD        vancomycin (VANCOCIN) oral solution 250 mg  250 mg Oral 4 times per day Mayte Blakely MD   250 mg at 11/02/20 1225    calcium gluconate 10 % injection 1 g  1 g Intravenous Once Michelle Tan MD        sodium chloride flush 0.9 % injection 10 mL  10 mL Intravenous 2 times per day Arvel Dance, MD   10 mL at 11/02/20 0910    sodium chloride flush 0.9 % injection 10 mL  10 mL Intravenous PRN Arvel Dance, MD   10 mL at 11/01/20 1851    acetaminophen (TYLENOL) tablet 650 mg  650 mg Oral Q6H PRN Arvel Dance, MD   650 mg at 11/01/20 0856    Or    acetaminophen (TYLENOL) suppository 650 mg  650 mg Rectal Q6H PRN Arvel Dance, MD        polyethylene glycol (GLYCOLAX) packet 17 g  17 g Oral Daily PRN Arvel Dance, MD        promethazine (PHENERGAN) tablet 12.5 mg  12.5 mg Oral Q6H PRN Arvel Dance, MD        Or    ondansetron (ZOFRAN) injection 4 mg  4 mg Intravenous Q6H PRN Arvel Dance, MD   4 mg at 11/01/20 1851    heparin (porcine) injection 5,000 Units  5,000 Units Subcutaneous 3 times per day Arvel Dance, MD   5,000 Units at 11/02/20 6600    levothyroxine (SYNTHROID) tablet 50 mcg  50 mcg Oral Daily Arvel Dance, MD   50 mcg at 11/02/20 0628    glucose (GLUTOSE) 40 % oral gel 15 g  15 g Oral PRN Arvel Dance, MD        dextrose 50 % IV solution  12.5 g Intravenous PRN Arvel Dance, MD        glucagon (rDNA) injection 1 mg  1 mg Intramuscular PRN Arvel Dance, MD        dextrose 5 % solution  100 mL/hr Intravenous PRN Arvel Dance, MD        insulin glargine (LANTUS) injection vial 10 Units  10 Units Subcutaneous Nightly Nate Juarez MD   10 Units at 11/01/20 2123    gentamicin (GARAMYCIN) 0.1 % cream   Topical Daily Joe Worley MD        epoetin nena-epbx (RETACRIT) injection 5,000 Units  5,000 Units Subcutaneous Once per day on Mon Wed Fri Joe Adame MD   5,000 Units at 11/02/20 0910    famotidine (PEPCID) tablet 20 mg  20 mg Oral Daily Nando Adam MD   20 mg at 11/02/20 0911    LORazepam (ATIVAN) tablet 0.5 mg  0.5 mg Oral BID PRN Nando Adam MD   0.5 mg at 11/01/20 2027       REVIEW OF SYSTEMS:    CONSTITUTIONAL:  Denies fever, chill or rigors  HEENT: denies blurring of vision or double vision, denies hearing problem  RESPIRATORY: Denies SOB   CARDIOVASCULAR:  Reports chest pain   GASTROINTESTINAL:  Denies abdomen pain, diarrhea or constipation. GENITOURINARY:  Denies burning urination or frequency of urination  INTEGUMENT: denies wound , rash  HEMATOLOGIC/LYMPHATIC:  Denies lymph node swelling, gum bleeding or easy bruising. MUSCULOSKELETAL:  Denies leg pain , joint pain , joint swelling  NEUROLOGICAL:  Weakness            PHYSICAL EXAM:      Vitals:     /79   Pulse 87   Temp 97.8 °F (36.6 °C) (Temporal)   Resp 16   Ht 5' 4\" (1.626 m)   Wt 143 lb 9.6 oz (65.1 kg)   LMP 07/01/2020   SpO2 94%   BMI 24.65 kg/m²       General Appearance:    Awake and alert, no distress    Head:    Normocephalic, atraumatic   Eyes:    No pallor, no icterus,   Ears:    No obvious deformity or drainage.    Nose:   No nasal drainage   Throat:   Mucosa moist, no oral thrush   Neck:   Supple, no lymphadenopathy   Lungs:     Clear to auscultation bilaterally, no wheeze    Heart:    Tachycardic, systolic murmur +   Abdomen:     Soft, non-tender, bowel sounds present, PD catheter in place    Extremities:   No edema, no cyanosis   Pulses:   Dorsalis pedis palpable    Skin:   no rashes or lesions       DATA:      CBC with Differential:      Lab Results   Component Value Date    WBC 6.0 11/02/2020    RBC 3.25 11/02/2020    HGB 9.7 11/02/2020    HCT 31.0 11/02/2020     11/02/2020    MCV 95.4 11/02/2020    MCH 29.8 11/02/2020    MCHC 31.3 11/02/2020    RDW 16.4 11/02/2020    NRBC 0.9 08/10/2020    SEGSPCT 67 06/27/2013    METASPCT 1.7 08/10/2020    LYMPHOPCT 30.2 11/02/2020    MONOPCT 7.5 11/02/2020    BASOPCT 1.7 11/02/2020    MONOSABS 0.45 11/02/2020    LYMPHSABS 1.81 11/02/2020    EOSABS 0.66 11/02/2020    BASOSABS 0.10 11/02/2020       CMP     Lab Results   Component Value Date     11/02/2020    K 4.4 11/02/2020    K 3.7 10/11/2020     11/02/2020    CO2 20 11/02/2020    BUN 56 11/02/2020    CREATININE 7.7 11/02/2020    GFRAA 8 11/02/2020    LABGLOM 8 11/02/2020    GLUCOSE 131 11/02/2020    GLUCOSE 130 05/18/2012    PROT 4.9 11/02/2020    LABALBU 2.8 11/02/2020    LABALBU 4.1 05/18/2012    CALCIUM 8.6 11/02/2020    BILITOT <0.2 11/02/2020    ALKPHOS 109 11/02/2020    AST 33 11/02/2020    ALT 45 11/02/2020         Hepatic Function Panel:    Lab Results   Component Value Date    ALKPHOS 109 11/02/2020    ALT 45 11/02/2020    AST 33 11/02/2020    PROT 4.9 11/02/2020    BILITOT <0.2 11/02/2020    BILIDIR <0.2 10/11/2020    IBILI see below 10/11/2020    LABALBU 2.8 11/02/2020    LABALBU 4.1 05/18/2012       PT/INR:    Lab Results   Component Value Date    PROTIME 10.6 10/31/2020    PROTIME 13.6 08/14/2011    INR 1.0 10/31/2020       TSH:    Lab Results   Component Value Date    TSH 6.000 10/31/2020       U/A:    Lab Results   Component Value Date    COLORU Yellow 11/01/2020    PHUR 8.0 11/01/2020    LABCAST RARE 01/12/2020    WBCUA NONE 11/01/2020    WBCUA 0-1 05/18/2012    RBCUA 2-5 11/01/2020    RBCUA 1-3 08/01/2013    MUCUS Present 02/12/2016    TRICHOMONAS Present 07/17/2020    YEAST RARE 05/24/2018    BACTERIA FEW 11/01/2020    CLARITYU Clear 11/01/2020    SPECGRAV 1.020 11/01/2020    LEUKOCYTESUR Negative 11/01/2020    UROBILINOGEN 0.2 11/01/2020    BILIRUBINUR Negative 11/01/2020    BILIRUBINUR SMALL 05/18/2012    BLOODU SMALL 11/01/2020    GLUCOSEU 250 11/01/2020    GLUCOSEU >=1000 05/18/2012    AMORPHOUS RARE 11/01/2019       ABG:    Lab Results   Component Value Date    ULU9QUT 22.1 10/29/2019    R3EVLAUC 97.7 09/08/2012    PHART 7.345 07/20/2012    GJE0XOP 35.0 10/29/2019    PO2ART 91.5 10/29/2019       MICROBIOLOGY:    Blood culture - Staph epidermidis  ( 10/8)     Vanco random level 22.9    YULISSA -     Summary    Ejection fraction is visually estimated at 60-65%.    There is a right atrial mobile mass approximately 2.4 cm x 0.4 cm, which    may represent a vegetation or mobile thrombus.    It attaches to the atrial septum superior to the fossa ovalis, at the    junction of the SVC and the RA.    Previously seen central venous catheter and associated mass are no longer    seen on this study.    Mild mitral regurgitation.    Mild tricuspid regurgitation.    No evidence of valvular vegetations       IMPRESSION:     1. 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 C diff infection ( at [facility] on 10/25)     RECOMMENDATIONS:      1. Vancomycin 1 gram IV X 1   2.  Oral vancomycin 250 mg po q 6 hrs

## 2020-11-02 NOTE — PROGRESS NOTES
[facility]  Internal Medicine Residency Program  Progress Note  - House Team 1    Patient:  Karen Giron 31 y.o. female MRN: [id number]     Date of Service: 11/2/2020     CC: fatigue and chest discomfort  Overnight events: None    Subjective       Patient seen and examined at bedside this AM, blood pressure 180/103, pulse 100, vitals otherwise stable. Patient is endorsing whole body pain rated 7/10. The pain is unrelenting with no alleviating or aggravating factors. She is also endorsing some dyspnea with walking to the bathroom. Denying subjective fevers, chills, nausea, vomiting, diarrhea, constipation. Objective     Physical Exam:  · Vitals: BP (!) 155/92   Pulse 94   Temp 97.8 °F (36.6 °C) (Temporal)   Resp 16   Ht 5' 4\" (1.626 m)   Wt 143 lb 9.6 oz (65.1 kg)   LMP 07/01/2020   SpO2 94%   BMI 24.65 kg/m²     · I & O - 24hr: No intake/output data recorded. · General Appearance: alert, appears stated age, cooperative and mild distress  · HEENT:  Head: Normocephalic, no lesions, without obvious abnormality. · Neck: no adenopathy, supple, symmetrical, trachea midline and thyroid not enlarged, symmetric, no tenderness/mass/nodules  · Lung: clear to auscultation bilaterally  · Heart: regular rate and rhythm  · Abdomen: soft, non-tender; bowel sounds normal; no masses,  no organomegaly  · Extremities:  extremities normal, atraumatic, no cyanosis or edema. · Musculokeletal: No joint swelling, no muscle tenderness. ROM normal in all joints of extremities.    · Neurologic: Mental status: Alert, oriented, thought content appropriate    Pertinent Labs & Imaging Studies     CBC with Differential:    Lab Results   Component Value Date    WBC 6.0 11/02/2020    RBC 3.25 11/02/2020    HGB 9.7 11/02/2020    HCT 31.0 11/02/2020     11/02/2020    MCV 95.4 11/02/2020    MCH 29.8 11/02/2020    MCHC 31.3 11/02/2020    RDW 16.4 11/02/2020    NRBC 0.9 08/10/2020    SEGSPCT 67 06/27/2013    METASPCT 1.7 08/10/2020    LYMPHOPCT 30.2 11/02/2020    MONOPCT 7.5 11/02/2020    BASOPCT 1.7 11/02/2020    MONOSABS 0.45 11/02/2020    LYMPHSABS 1.81 11/02/2020    EOSABS 0.66 11/02/2020    BASOSABS 0.10 11/02/2020     CMP:    Lab Results   Component Value Date     11/02/2020    K 4.4 11/02/2020    K 3.7 10/11/2020     11/02/2020    CO2 20 11/02/2020    BUN 56 11/02/2020    CREATININE 7.7 11/02/2020    GFRAA 8 11/02/2020    LABGLOM 8 11/02/2020    GLUCOSE 131 11/02/2020    GLUCOSE 130 05/18/2012    PROT 4.9 11/02/2020    LABALBU 2.8 11/02/2020    LABALBU 4.1 05/18/2012    CALCIUM 8.6 11/02/2020    BILITOT <0.2 11/02/2020    ALKPHOS 109 11/02/2020    AST 33 11/02/2020    ALT 45 11/02/2020     Magnesium:    Lab Results   Component Value Date    MG 2.3 11/02/2020     Phosphorus:    Lab Results   Component Value Date    PHOS 9.1 11/02/2020       ECHO Transesophageal     Summary   Ejection fraction is visually estimated at 60-65%. There is a right atrial mobile mass approximately 2.4 cm x 0.4 cm, which   may represent a vegetation or mobile thrombus. It attaches to the atrial septum superior to the fossa ovalis, at the   junction of the SVC and the RA. Previously seen central venous catheter and associated mass are no longer   seen on this study. Mild mitral regurgitation. Mild tricuspid regurgitation. No evidence of valvular vegetations. Student's Assessment and Plan     [address] is a 31 y.o. female w/ PMHx of acute congestive heart failure (YULISSA 11/2/20 visually estimates EF 60-65%), ESRD on peritoneal hemodialysis, poorly controller type 1 diabetes mellitus, diabetic ketoacidosis, hypothyroidism, HTN, and HLD presented to the ED with CC of fatigue and chest pain.      1. Endocarditis   -YULISSA (10/19/20) demonstrated a large irregular, 2cm x 1cm, mobile, echogenic mass attached to the right atrial side of interatrial septum near SVC opening suggestive of vegetation or

## 2020-11-02 NOTE — PROGRESS NOTES
Department of Internal Medicine  Nephrology Progress Note    Events Reviewed. Subjective: We are following Miss Danielle Fontana for ESRD on CCPD and Hyperkalemia. She reports no complaints.  Remains on D10 drip      PHYSICAL EXAM:      Vitals:    VITALS:  BP (!) 178/98   Pulse 105   Temp 97.7 °F (36.5 °C) (Temporal)   Resp 16   Ht 5' 4\" (1.626 m)   Wt 146 lb 9.6 oz (66.5 kg) Comment: EMPTY- after PD  LMP 07/01/2020   SpO2 92%   BMI 25.16 kg/m²   24HR INTAKE/OUTPUT:      Intake/Output Summary (Last 24 hours) at 11/1/2020 1212  Last data filed at 11/1/2020 1000  Gross per 24 hour   Intake 1149 ml   Output 2178 ml   Net -1029 ml       PD Catheter Exam:  PD catheter exit site clean    Constitutional:  Alert and oriented, in no distress  HEENT:  Normocephalic, PERRL  Respiratory:  CTA bilaterally  Cardiovascular/Edema:  RRR, S1/S2  Gastrointestinal:  Soft, PD catheter exit site clean   Neurologic:  Nonfocal, AVELAR  Skin:  Warm, dry, no lesions  Other:  No edema     Scheduled Meds:   vancomycin  1,000 mg Intravenous Once    dilTIAZem  240 mg Oral Daily    bumetanide  2 mg Oral BID    metOLazone  5 mg Oral Daily    lisinopril  20 mg Oral Daily    insulin lispro  0-12 Units Subcutaneous Q4H    hydrALAZINE  25 mg Oral 3 times per day    lidocaine  5 mL Intradermal Once    heparin flush  1 mL Intravenous 2 times per day    vancomycin  250 mg Oral 4 times per day    calcium gluconate  1 g Intravenous Once    sodium chloride flush  10 mL Intravenous 2 times per day    heparin (porcine)  5,000 Units Subcutaneous 3 times per day    levothyroxine  50 mcg Oral Daily    insulin glargine  10 Units Subcutaneous Nightly    gentamicin   Topical Daily    epoetin nena-epbx  5,000 Units Subcutaneous Once per day on Mon Wed Fri    famotidine  20 mg Oral Daily     Continuous Infusions:   dextrose       PRN Meds:.oxyCODONE-acetaminophen **OR** oxyCODONE-acetaminophen, sodium chloride flush, heparin flush, sodium chloride flush, acetaminophen **OR** acetaminophen, polyethylene glycol, promethazine **OR** ondansetron, glucose, dextrose, glucagon (rDNA), dextrose, LORazepam      DATA:    CBC with Differential:    Lab Results   Component Value Date    WBC 6.0 11/02/2020    RBC 3.25 11/02/2020    HGB 9.7 11/02/2020    HCT 31.0 11/02/2020     11/02/2020    MCV 95.4 11/02/2020    MCH 29.8 11/02/2020    MCHC 31.3 11/02/2020    RDW 16.4 11/02/2020    NRBC 0.9 08/10/2020    SEGSPCT 67 06/27/2013    METASPCT 1.7 08/10/2020    LYMPHOPCT 30.2 11/02/2020    MONOPCT 7.5 11/02/2020    BASOPCT 1.7 11/02/2020    MONOSABS 0.45 11/02/2020    LYMPHSABS 1.81 11/02/2020    EOSABS 0.66 11/02/2020    BASOSABS 0.10 11/02/2020     CMP:    Lab Results   Component Value Date     11/02/2020    K 4.4 11/02/2020    K 3.7 10/11/2020     11/02/2020    CO2 20 11/02/2020    BUN 56 11/02/2020    CREATININE 7.7 11/02/2020    GFRAA 8 11/02/2020    LABGLOM 8 11/02/2020    GLUCOSE 131 11/02/2020    GLUCOSE 130 05/18/2012    PROT 4.9 11/02/2020    LABALBU 2.8 11/02/2020    LABALBU 4.1 05/18/2012    CALCIUM 8.6 11/02/2020    BILITOT <0.2 11/02/2020    ALKPHOS 109 11/02/2020    AST 33 11/02/2020    ALT 45 11/02/2020     Magnesium:    Lab Results   Component Value Date    MG 2.3 11/02/2020     Phosphorus:    Lab Results   Component Value Date    PHOS 9.1 11/02/2020       Radiology Review:      Chest XR 10/31/20   Mild areas of patchy opacities in the bilateral lower lungs which could    represent atelectasis versus airspace disease process.         Mild cardiomegaly.                    BRIEF SUMMARY OF INITIAL CONSULT:    Briefly, Isabel Oneill is a 31year-old female with history of ESRD secondary to hypertensive nephrosclerosis diabetic nephropathy, on Peritoneal Dialysis 10 liters/day with 4 manual exchanges of 2 liters, 1.5% every 9 hours/Cycle, last fill 2L of 2.5%, with severe proteinuria, poorly controlled type I DM with multiple admissions

## 2020-11-02 NOTE — PROCEDURES
PRELIMINARY TRANSESOPHAGEAL ECHOCARDIOGRAPHY REPORT    Date of Procedure: 11/2/2020    Indication: RA mass    Sedation: Propofol    Complications: None    Preliminary findings:  Normal LV function   Normal RV function   Mild TR  No evidence of valvular vegetations   No left atrial appendage thrombus    There is a. irregular mobile RA mass which appears to attach near the atrial septum at the juncture of the SVC and RA. Previously seen central catheter and associated vegetation are no longer present.     Full report to follow    Wanda Martell MD, [address] [facility]

## 2020-11-02 NOTE — FLOWSHEET NOTE
CCPD initiated as ordered. Dressing not Changed or ointment applied, Pt. refused; First drain/fill observed without complications, Straw Yellow effluent noted. Please contact the dialysis nurse on call with any issues. 11/02/20 1620   Vitals   BP (!) 170/101   Temp 97 °F (36.1 °C)   Pulse 92   Resp 14   SpO2 94 %   Weight 145 lb 8.1 oz (66 kg)   Peritoneal Dialysis Catheter Left lower abdomen   Placement Date/Time: 10/31/20 0704   Catheter Location: Left lower abdomen   Status Accessed   Site Condition No Complications   Dressing Status Clean;Dry; Intact   Dressing Gauze   Cycler   Verification of Prescription CCPD   Total Volume Programmed 55210 mL   Fill Volume 2000 mL   Last Fill Volume 2000 mL   Dextrose Setting Different (Extraneal)   Number of Cycles 5   Bag Volume 5000 mL   Number of Bags Used 3   Dianeal Solution Other (Comment)  (1.5% & 2.5% for final fill)

## 2020-11-02 NOTE — PLAN OF CARE
Problem: Pain:  Goal: Pain level will decrease  Description: Pain level will decrease  Outcome: Met This Shift     Problem: Skin Integrity:  Goal: Will show no infection signs and symptoms  Description: Will show no infection signs and symptoms  Outcome: Met This Shift

## 2020-11-02 NOTE — ANESTHESIA POSTPROCEDURE EVALUATION
Department of Anesthesiology  Postprocedure Note    Patient: Yash Maldonado  MRN: [id number]  YOB: 1990  Date of evaluation: 11/2/2020  Time:  11:36 AM     Procedure Summary     Date:  11/02/20 Room / Location:  [facility]; [facility]    Anesthesia Start:  1034 Anesthesia Stop:  1109    Procedure:  [facility] YULISSA Diagnosis:      Scheduled Providers:  Cathie Mckeon DO; HEBER Lawson - TROY Responsible Provider:  Cathie Mckeon DO    Anesthesia Type:  MAC ASA Status:  4          Anesthesia Type: MAC    Shilpa Phase I:      Shilpa Phase II:      Last vitals: Reviewed and per EMR flowsheets.        Anesthesia Post Evaluation    Patient location during evaluation: PACU  Patient participation: complete - patient participated  Level of consciousness: awake and alert  Airway patency: patent  Nausea & Vomiting: no nausea and no vomiting  Complications: no  Cardiovascular status: blood pressure returned to baseline  Respiratory status: acceptable  Hydration status: euvolemic

## 2020-11-02 NOTE — PLAN OF CARE
Problem: Pain:  Goal: Pain level will decrease  Description: Pain level will decrease  11/2/2020 [id number] by Meagan Schwab RN  Outcome: Met This Shift  11/1/2020 1734 by Eric Sawyer RN  Outcome: Met This Shift  Goal: Control of acute pain  Description: Control of acute pain  11/2/2020 2109 by Meagan Schwab RN  Outcome: Met This Shift  11/1/2020 1734 by Eric Sawyer RN  Outcome: Met This Shift  Goal: Control of chronic pain  Description: Control of chronic pain  11/1/2020 1734 by Eric Sawyer RN  Outcome: Met This Shift

## 2020-11-02 NOTE — ANESTHESIA PRE PROCEDURE
Department of Anesthesiology  Preprocedure Note       Name:  Cherlynn Severance   Age:  31 y.o.  :  1990                                          MRN:  [id number]         Date:  2020      Surgeon: * No surgeons listed *    Procedure: TRANSOESOPHAGEAL ECHO CARDIOGRAM    Medications prior to admission:   Prior to Admission medications    Medication Sig Start Date End Date Taking?  Authorizing Provider   Heparin Sod, Porcine, in D5W (HEPARIN, PORCINE,) 100 UNIT/ML SOLN infusion As per nomogram 10/24/20   Elana Baptiste MD   insulin glargine (LANTUS) 100 UNIT/ML injection vial Inject 12 Units into the skin every morning 10/25/20   Elana Baptiste MD   insulin lispro (HUMALOG) 100 UNIT/ML injection vial Inject 5 Units into the skin 3 times daily (with meals) 10/24/20   Elana Baptiste MD   insulin lispro (HUMALOG) 100 UNIT/ML injection vial Inject 0-6 Units into the skin 3 times daily (with meals) **Corrective Low Dose Algorithm**  Glucose: Dose:               No Insulin  140-199 1 Unit  200-249 2 Units  250-299 3 Units  300-349 4 Units  350-399 5 Units  Over 399 6 Units 10/24/20   Elana Baptiste MD   insulin lispro (HUMALOG) 100 UNIT/ML injection vial Inject 0-3 Units into the skin nightly Glucose: Dose:               No Insulin  140-249 1 Unit  250-349 2 Units  Over 350 3 Units 10/24/20   Elana Baptiste MD   lactobacillus (CULTURELLE) CAPS capsule Take 1 capsule by mouth every 12 hours 10/24/20   Elana Baptiste MD   Insulin Syringes, Disposable, U-100 0.3 ML MISC To use daily 10/9/20   Roderick Sandhoff, MD   metOLazone (ZAROXOLYN) 5 MG tablet Take 5 mg by mouth daily    Historical Provider, MD   vitamin D (ERGOCALCIFEROL) 1.25 MG (96783 UT) CAPS capsule Take 50,000 Units by mouth once a week Saturday    Historical Provider, MD   Multiple Vitamins-Minerals (RENAPLEX-D) TABS Take 1 tablet by mouth daily    Historical Provider, MD   cloNIDine (CATAPRES) 0.1 MG tablet Take 0.1 MG per tablet 0.5 tablet  0.5 tablet Oral Q6H PRN Abbie Lang MD        Or    oxyCODONE-acetaminophen (PERCOCET) 5-325 MG per tablet 1 tablet  1 tablet Oral Q6H PRN Abbie Lang MD   1 tablet at 11/01/20 1711    hydrALAZINE (APRESOLINE) tablet 25 mg  25 mg Oral 3 times per day Anu Isidro MD   25 mg at 11/02/20 0626    lidocaine 1 % injection 5 mL  5 mL Intradermal Once Ishan Dee MD        sodium chloride flush 0.9 % injection 10 mL  10 mL Intravenous PRN Ishan Dee MD   10 mL at 11/01/20 1708    heparin flush 100 UNIT/ML injection 100 Units  1 mL Intravenous 2 times per day Rakel Reyes MD   100 Units at 11/02/20 0910    heparin flush 100 UNIT/ML injection 100 Units  1 mL Intracatheter PRN Ishan Dee MD        vancomycin (VANCOCIN) oral solution 250 mg  250 mg Oral 4 times per day Rakel Reyes MD   250 mg at 11/02/20 3502    calcium gluconate 10 % injection 1 g  1 g Intravenous Once Hanh Saeed MD        sodium chloride flush 0.9 % injection 10 mL  10 mL Intravenous 2 times per day Ralph Laura MD   10 mL at 11/02/20 0910    sodium chloride flush 0.9 % injection 10 mL  10 mL Intravenous PRN Ralph Laura MD   10 mL at 11/01/20 1851    acetaminophen (TYLENOL) tablet 650 mg  650 mg Oral Q6H PRN Ralph Laura MD   650 mg at 11/01/20 0856    Or    acetaminophen (TYLENOL) suppository 650 mg  650 mg Rectal Q6H PRN Ralph Laura MD        polyethylene glycol (GLYCOLAX) packet 17 g  17 g Oral Daily PRN Ralph Laura MD        promethazine (PHENERGAN) tablet 12.5 mg  12.5 mg Oral Q6H PRN Ralph Laura MD        Or    ondansetron (ZOFRAN) injection 4 mg  4 mg Intravenous Q6H PRN Ralph Laura MD   4 mg at 11/01/20 1851    heparin (porcine) injection 5,000 Units  5,000 Units Subcutaneous 3 times per day Ralph Laura MD   5,000 Units at 11/02/20 0625    levothyroxine (SYNTHROID) tablet 50 mcg  50 mcg Oral Daily Ralph Laura MD   50 mcg at 20 0628    glucose (GLUTOSE) 40 % oral gel 15 g  15 g Oral PRN Rafita Gabriel MD        dextrose 50 % IV solution  12.5 g Intravenous PRN Rafita Gabriel MD        glucagon (rDNA) injection 1 mg  1 mg Intramuscular PRN Rafita Gabriel MD        dextrose 5 % solution  100 mL/hr Intravenous PRN Rafita Gabriel MD        insulin glargine (LANTUS) injection vial 10 Units  10 Units Subcutaneous Nightly Meli Driver MD   10 Units at 20    gentamicin (GARAMYCIN) 0.1 % cream   Topical Daily Joe Worley MD        epoetin nena-epbx (RETACRIT) injection 5,000 Units  5,000 Units Subcutaneous Once per day on  Joe Ewing MD   5,000 Units at 20 0910    famotidine (PEPCID) tablet 20 mg  20 mg Oral Daily Chance Doe MD   20 mg at 20 0911    LORazepam (ATIVAN) tablet 0.5 mg  0.5 mg Oral BID PRN Chance Doe MD   0.5 mg at 20       Allergies:     Allergies   Allergen Reactions    Toradol [Ketorolac Tromethamine] Anaphylaxis and Hives       Problem List:    Patient Active Problem List   Diagnosis Code    High-risk pregnancy O09.90    Previous stillbirth or demise, antepartum O09.299    Non compliance w medication regimen Z91.14    Tobacco smoking complicating pregnancy O63.375    Drug use complicating pregnancy in third trimester O99.323    MTHFR mutation ([address] E72.12    Poorly controlled type 1 diabetes mellitus ([address] E10.65    Diabetes mellitus type 1, uncontrolled ([address] E10.65    Previous  delivery affecting pregnancy, antepartum O34.219    Opioid abuse, episodic ([address] F11.10    Tobacco use Z72.0    Hypertension I10    Type 1 diabetes mellitus with other specified complication ([address] C18.86    Diabetic gastroparesis associated with type 1 diabetes mellitus ([address] E10.43, K31.84    Iron deficiency anemia D50.9    Sinus tachycardia R00.0    Bladder dysfunction N31.9    Acute heart failure with preserved ejection fraction (HCC) I50.31    Chronic kidney disease N18.9    Severe protein-calorie malnutrition ([facility] E43    ESRD (end stage renal disease) ([facility] N18.6    Uncontrolled type 1 diabetes mellitus with hyperglycemia, with long-term current use of insulin ([address] E10.65    ESRD on peritoneal dialysis ([address] N18.6, Z99.2    Hypothyroidism E03.9    Hyperlipidemia E78.5    Acute cystitis without hematuria N30.00    Nondisplaced fracture of neck of fifth metacarpal bone, left hand, initial encounter for closed fracture S62.367A    Acute encephalopathy G93.40    Effusion of left knee M25.462    Acute metabolic encephalopathy L05.20    Chronic right-sided low back pain M54.5, G89.29    Endocarditis I38    Chronic diarrhea K52.9       Past Medical History:        Diagnosis Date    Acute congestive heart failure (HCC)     BC (acute kidney injury) ([facility] 10/1/2019    Cephalgia 10/9/2019    Chronic kidney disease     Depression     Diabetes mellitus ([facility]     Diabetic ketoacidosis ([facility] 2011    Hemodialysis patient ([facility]     History of blood transfusion 2019    Hyperlipidemia 10/8/2020    Hypothyroidism 10/8/2020    Iron deficiency anemia 10/1/2019    MDRO (multiple drug resistant organisms) resistance     MRSA (methicillin resistant Staphylococcus aureus)     back wound abcess    Non compliance w medication regimen 3/30/2016    Other disorders of kidney and ureter     Pregnancy 3/30/2016    16 weeks    Severe pre-eclampsia in third trimester 2016       Past Surgical History:        Procedure Laterality Date    BACK SURGERY      abscess     SECTION      x2    CHOLECYSTECTOMY, LAPAROSCOPIC N/A 2019    CHOLECYSTECTOMY LAPAROSCOPIC performed by Delfina Burnette MD at [address] [facility] N/A 2018    COLONOSCOPY WITH BIOPSY performed by Gus Marcial MD at [id number] [place] [address] COLONOSCOPY N/A 2018    COLONOSCOPY WITH BIOPSY performed by Irwin Ramachandran MD at [facility] ENDOSCOPY    ECHO COMPL W DOP COLOR FLOW  1/31/2012    EF 57%    ECHO COMPL W DOP COLOR FLOW  6/10/2013         LAPAROSCOPY INSERTION PERITONEAL CATHETER N/A 6/26/2020    LAPAROSCOPIC INSERTION PERITONEAL DIALYSIS CATHETER performed by Nia Matias MD at [facility] ESOPHAGOGASTRODUODENOSCOPY TRANSORAL DIAGNOSTIC N/A 5/30/2018    EGD ESOPHAGOGASTRODUODENOSCOPY performed by James Garza MD at [address] [place] [address] TRANSESOPHAGEAL ECHOCARDIOGRAM N/A 10/19/2020    TRANSESOPHAGEAL ECHOCARDIOGRAM WITH BUBBLE STUDY performed by Jase Torres MD at [address] [facility] TUNNELED VENOUS PORT PLACEMENT  04/2018    UPPER GASTROINTESTINAL ENDOSCOPY  12/18/2018    EGD BIOPSY performed by Lakshmi Da Silva MD at [facility] N/A 10/11/2019    EGD ESOPHAGOGASTRODUODENOSCOPY performed by Yoni Hurd DO at [facility] History:    Social History     Tobacco Use    Smoking status: Current Every Day Smoker     Packs/day: 0.50     Years: 6.00     Pack years: 3.00     Types: Cigarettes    Smokeless tobacco: Current User   Substance Use Topics    Alcohol use: No                                Ready to quit: Not Answered  Counseling given: Not Answered      Vital Signs (Current):   Vitals:    11/02/20 0734 11/02/20 0817 11/02/20 0939 11/02/20 1109   BP: (!) 160/80 (!) 180/103 (!) 161/83 121/79   Pulse:  100 91 87   Resp:  16  16   Temp:  97.8 °F (36.6 °C)     TempSrc:  Temporal     SpO2:  97%  94%   Weight:       Height:                                                  BP Readings from Last 3 Encounters:   11/02/20 121/79   11/02/20 119/68   10/24/20 (!) 157/84       NPO Status:  > 8hrs                                                      BMI:   Wt Readings from Last 3 Encounters:   11/02/20 143 lb 9.6 oz (65.1 kg)   10/22/20 143 lb 1.3 oz (64.9 kg)   09/03/20 129 lb (58.5 kg)     Body mass index is 24.65 kg/m².     CBC:   Lab Results   Component Value Date    WBC 6.0 11/02/2020    RBC 3.25 11/02/2020    HGB 9.7 11/02/2020    HCT 31.0 11/02/2020    MCV 95.4 11/02/2020    RDW 16.4 11/02/2020     11/02/2020       CMP:   Lab Results   Component Value Date     11/02/2020    K 4.4 11/02/2020    K 3.7 10/11/2020     11/02/2020    CO2 20 11/02/2020    BUN 56 11/02/2020    CREATININE 7.7 11/02/2020    GFRAA 8 11/02/2020    LABGLOM 8 11/02/2020    GLUCOSE 131 11/02/2020    GLUCOSE 130 05/18/2012    PROT 4.9 11/02/2020    CALCIUM 8.6 11/02/2020    BILITOT <0.2 11/02/2020    ALKPHOS 109 11/02/2020    AST 33 11/02/2020    ALT 45 11/02/2020       POC Tests: No results for input(s): POCGLU, POCNA, POCK, POCCL, POCBUN, POCHEMO, POCHCT in the last 72 hours. Coags:   Lab Results   Component Value Date    PROTIME 10.6 10/31/2020    PROTIME 13.6 08/14/2011    INR 1.0 10/31/2020    APTT 30.0 10/31/2020       HCG (If Applicable):   Lab Results   Component Value Date    PREGTESTUR NEGATIVE 10/08/2020    HCG 70077.4 (H) 06/26/2013        ABGs:   Lab Results   Component Value Date    PHART 7.345 07/20/2012    PO2ART 91.5 10/29/2019    LMC8QJV 35.0 10/29/2019    WBC4OFB 22.1 10/29/2019    B7DOOKEF 97.7 09/08/2012        Type & Screen (If Applicable):  Lab Results   Component Value Date    LABABO O 12/29/2011    [address] POSITIVE 12/29/2011       Anesthesia Evaluation  Patient summary reviewed and Nursing notes reviewed no history of anesthetic complications:   Airway: Mallampati: II  TM distance: >3 FB   Neck ROM: full  Mouth opening: > = 3 FB Dental:          Pulmonary: breath sounds clear to auscultation  (+) pneumonia: resolved,  current smoker    (-) shortness of breath                           Cardiovascular:  Exercise tolerance: good (>4 METS),   (+) hypertension: moderate, CHF:, ARIAS:, hyperlipidemia      ECG reviewed  Rhythm: regular  Rate: normal  Echocardiogram reviewed         Beta Blocker:  Dose within 24 Hrs      ROS comment: EKG: Normal Sinus Rhythm 87.     ECHO: Trileaflet aortic valve. Normal leaflet mobility. No aortic stenosis. No aortic regurgitation. Normal left atrium. Interatrial septum not well visualized. Normal left ventricular chamber size. Normal left ventricular systolic function. Visually estimated LVEF is 60 %. No wall motion abnormalities. Normal left atrial pressure. Normal aortic root and ascending aorta. Physiologic and/or trace mitral regurgitation is present. No evidence of hemodynamically significant mitral stenosis. No evidence for hemodynamically significant pericardial effusion. Normal right ventricle structure and function. Normal tricuspid valve structure and function. Neuro/Psych:   (+) headaches:, psychiatric history:depression/anxiety             GI/Hepatic/Renal:   (+) renal disease (DKA): kidney stones, ESRD and dialysis,          ROS comment: Significantly delayed gastric emptying with persistent uptake within the esophagus    . Endo/Other:    (+) DiabetesType I DM, poorly controlled, using insulin, blood dyscrasia (MTHFR mutation ([facility])): anemia:., .                  ROS comment: Port due to poor IV access  Opiod abuse Abdominal:           Vascular: negative vascular ROS. Anesthesia Plan      MAC     ASA 4       Induction: intravenous. Anesthetic plan and risks discussed with patient. Plan discussed with attending.

## 2020-11-02 NOTE — FLOWSHEET NOTE
[facility] tx initiated using strict aseptic technique, dsg changed, gent cream applied, no distress.   4 exchanges all 2L of 1.5% dextose x 9 hours, last fill 2L of 2.5% (total 10 liters)

## 2020-11-03 LAB
ALBUMIN SERPL-MCNC: 3 G/DL (ref 3.5–5.2)
ALP BLD-CCNC: 117 U/L (ref 35–104)
ALT SERPL-CCNC: 40 U/L (ref 0–32)
ANION GAP SERPL CALCULATED.3IONS-SCNC: 16 MMOL/L (ref 7–16)
AST SERPL-CCNC: 23 U/L (ref 0–31)
BASOPHILS ABSOLUTE: 0.14 E9/L (ref 0–0.2)
BASOPHILS RELATIVE PERCENT: 2 % (ref 0–2)
BILIRUB SERPL-MCNC: <0.2 MG/DL (ref 0–1.2)
BUN BLDV-MCNC: 46 MG/DL (ref 6–20)
CALCIUM SERPL-MCNC: 8.9 MG/DL (ref 8.6–10.2)
CHLORIDE BLD-SCNC: 100 MMOL/L (ref 98–107)
CO2: 25 MMOL/L (ref 22–29)
CREAT SERPL-MCNC: 7.4 MG/DL (ref 0.5–1)
EOSINOPHILS ABSOLUTE: 0.79 E9/L (ref 0.05–0.5)
EOSINOPHILS RELATIVE PERCENT: 11.6 % (ref 0–6)
GFR AFRICAN AMERICAN: 8
GFR NON-AFRICAN AMERICAN: 8 ML/MIN/1.73
GLUCOSE BLD-MCNC: 58 MG/DL (ref 74–99)
HCG(URINE) PREGNANCY TEST: NEGATIVE
HCT VFR BLD CALC: 35.2 % (ref 34–48)
HEMOGLOBIN: 11 G/DL (ref 11.5–15.5)
IMMATURE GRANULOCYTES #: 0.02 E9/L
IMMATURE GRANULOCYTES %: 0.3 % (ref 0–5)
LYMPHOCYTES ABSOLUTE: 1.91 E9/L (ref 1.5–4)
LYMPHOCYTES RELATIVE PERCENT: 28 % (ref 20–42)
MAGNESIUM: 2.3 MG/DL (ref 1.6–2.6)
MCH RBC QN AUTO: 29.5 PG (ref 26–35)
MCHC RBC AUTO-ENTMCNC: 31.3 % (ref 32–34.5)
MCV RBC AUTO: 94.4 FL (ref 80–99.9)
METER GLUCOSE: 100 MG/DL (ref 74–99)
METER GLUCOSE: 101 MG/DL (ref 74–99)
METER GLUCOSE: 109 MG/DL (ref 74–99)
METER GLUCOSE: 167 MG/DL (ref 74–99)
METER GLUCOSE: 191 MG/DL (ref 74–99)
METER GLUCOSE: 203 MG/DL (ref 74–99)
METER GLUCOSE: 262 MG/DL (ref 74–99)
METER GLUCOSE: 49 MG/DL (ref 74–99)
METER GLUCOSE: 51 MG/DL (ref 74–99)
METER GLUCOSE: 53 MG/DL (ref 74–99)
METER GLUCOSE: 53 MG/DL (ref 74–99)
METER GLUCOSE: 57 MG/DL (ref 74–99)
METER GLUCOSE: 66 MG/DL (ref 74–99)
METER GLUCOSE: 93 MG/DL (ref 74–99)
MONOCYTES ABSOLUTE: 0.45 E9/L (ref 0.1–0.95)
MONOCYTES RELATIVE PERCENT: 6.6 % (ref 2–12)
NEUTROPHILS ABSOLUTE: 3.52 E9/L (ref 1.8–7.3)
NEUTROPHILS RELATIVE PERCENT: 51.5 % (ref 43–80)
PDW BLD-RTO: 16.1 FL (ref 11.5–15)
PHOSPHORUS: 8 MG/DL (ref 2.5–4.5)
PLATELET # BLD: 255 E9/L (ref 130–450)
PMV BLD AUTO: 9.6 FL (ref 7–12)
POTASSIUM SERPL-SCNC: 3.8 MMOL/L (ref 3.5–5)
RBC # BLD: 3.73 E12/L (ref 3.5–5.5)
SODIUM BLD-SCNC: 141 MMOL/L (ref 132–146)
TOTAL PROTEIN: 5.2 G/DL (ref 6.4–8.3)
WBC # BLD: 6.8 E9/L (ref 4.5–11.5)

## 2020-11-03 PROCEDURE — 6370000000 HC RX 637 (ALT 250 FOR IP): Performed by: INTERNAL MEDICINE

## 2020-11-03 PROCEDURE — 36592 COLLECT BLOOD FROM PICC: CPT

## 2020-11-03 PROCEDURE — 81025 URINE PREGNANCY TEST: CPT

## 2020-11-03 PROCEDURE — 82962 GLUCOSE BLOOD TEST: CPT

## 2020-11-03 PROCEDURE — 2580000003 HC RX 258: Performed by: INTERNAL MEDICINE

## 2020-11-03 PROCEDURE — 99233 SBSQ HOSP IP/OBS HIGH 50: CPT | Performed by: INTERNAL MEDICINE

## 2020-11-03 PROCEDURE — 6360000002 HC RX W HCPCS: Performed by: INTERNAL MEDICINE

## 2020-11-03 PROCEDURE — 80053 COMPREHEN METABOLIC PANEL: CPT

## 2020-11-03 PROCEDURE — 83735 ASSAY OF MAGNESIUM: CPT

## 2020-11-03 PROCEDURE — 36415 COLL VENOUS BLD VENIPUNCTURE: CPT

## 2020-11-03 PROCEDURE — 84100 ASSAY OF PHOSPHORUS: CPT

## 2020-11-03 PROCEDURE — 85025 COMPLETE CBC W/AUTO DIFF WBC: CPT

## 2020-11-03 PROCEDURE — 2140000000 HC CCU INTERMEDIATE R&B

## 2020-11-03 PROCEDURE — 90945 DIALYSIS ONE EVALUATION: CPT

## 2020-11-03 PROCEDURE — 2500000003 HC RX 250 WO HCPCS: Performed by: INTERNAL MEDICINE

## 2020-11-03 RX ORDER — DEXTROSE MONOHYDRATE 50 MG/ML
INJECTION, SOLUTION INTRAVENOUS CONTINUOUS
Status: DISCONTINUED | OUTPATIENT
Start: 2020-11-03 | End: 2020-11-03

## 2020-11-03 RX ORDER — DEXTROSE MONOHYDRATE 50 MG/ML
INJECTION, SOLUTION INTRAVENOUS CONTINUOUS
Status: DISCONTINUED | OUTPATIENT
Start: 2020-11-03 | End: 2020-11-06

## 2020-11-03 RX ORDER — SUMATRIPTAN 50 MG/1
50 TABLET, FILM COATED ORAL ONCE
Status: COMPLETED | OUTPATIENT
Start: 2020-11-03 | End: 2020-11-03

## 2020-11-03 RX ORDER — LISINOPRIL 20 MG/1
20 TABLET ORAL NIGHTLY
Status: DISCONTINUED | OUTPATIENT
Start: 2020-11-03 | End: 2020-11-04

## 2020-11-03 RX ORDER — SEVELAMER CARBONATE 800 MG/1
800 TABLET, FILM COATED ORAL
Status: DISCONTINUED | OUTPATIENT
Start: 2020-11-03 | End: 2020-11-08

## 2020-11-03 RX ORDER — SENNA PLUS 8.6 MG/1
1 TABLET ORAL NIGHTLY
Status: DISCONTINUED | OUTPATIENT
Start: 2020-11-03 | End: 2020-11-11 | Stop reason: HOSPADM

## 2020-11-03 RX ADMIN — DILTIAZEM HYDROCHLORIDE 240 MG: 240 CAPSULE, EXTENDED RELEASE ORAL at 08:19

## 2020-11-03 RX ADMIN — OXYCODONE AND ACETAMINOPHEN 1 TABLET: 5; 325 TABLET ORAL at 10:56

## 2020-11-03 RX ADMIN — SODIUM CHLORIDE, PRESERVATIVE FREE 100 UNITS: 5 INJECTION INTRAVENOUS at 08:19

## 2020-11-03 RX ADMIN — HYDRALAZINE HYDROCHLORIDE 25 MG: 25 TABLET, FILM COATED ORAL at 20:16

## 2020-11-03 RX ADMIN — ACETAMINOPHEN 650 MG: 325 TABLET ORAL at 09:33

## 2020-11-03 RX ADMIN — Medication 10 ML: at 08:19

## 2020-11-03 RX ADMIN — HYDRALAZINE HYDROCHLORIDE 25 MG: 25 TABLET, FILM COATED ORAL at 13:48

## 2020-11-03 RX ADMIN — HEPARIN SODIUM 5000 UNITS: 10000 INJECTION INTRAVENOUS; SUBCUTANEOUS at 05:56

## 2020-11-03 RX ADMIN — HEPARIN 100 UNITS: 100 SYRINGE at 05:56

## 2020-11-03 RX ADMIN — LEVOTHYROXINE SODIUM 75 MCG: 0.07 TABLET ORAL at 05:58

## 2020-11-03 RX ADMIN — FAMOTIDINE 20 MG: 20 TABLET ORAL at 08:18

## 2020-11-03 RX ADMIN — ONDANSETRON 4 MG: 2 INJECTION INTRAMUSCULAR; INTRAVENOUS at 17:28

## 2020-11-03 RX ADMIN — BUMETANIDE 2 MG: 1 TABLET ORAL at 08:18

## 2020-11-03 RX ADMIN — DEXTROSE MONOHYDRATE: 50 INJECTION, SOLUTION INTRAVENOUS at 09:34

## 2020-11-03 RX ADMIN — SODIUM CHLORIDE, PRESERVATIVE FREE 10 ML: 5 INJECTION INTRAVENOUS at 05:55

## 2020-11-03 RX ADMIN — HEPARIN SODIUM 5000 UNITS: 10000 INJECTION INTRAVENOUS; SUBCUTANEOUS at 20:17

## 2020-11-03 RX ADMIN — HYDRALAZINE HYDROCHLORIDE 25 MG: 25 TABLET, FILM COATED ORAL at 05:58

## 2020-11-03 RX ADMIN — LORAZEPAM 0.5 MG: 0.5 TABLET ORAL at 20:17

## 2020-11-03 RX ADMIN — SODIUM CHLORIDE, PRESERVATIVE FREE 10 ML: 5 INJECTION INTRAVENOUS at 20:15

## 2020-11-03 RX ADMIN — Medication 10 ML: at 20:38

## 2020-11-03 RX ADMIN — SUMATRIPTAN SUCCINATE 50 MG: 50 TABLET, FILM COATED ORAL at 01:11

## 2020-11-03 RX ADMIN — DEXTROSE MONOHYDRATE 12.5 G: 500 INJECTION PARENTERAL at 00:45

## 2020-11-03 RX ADMIN — INSULIN LISPRO 1 UNITS: 100 INJECTION, SOLUTION INTRAVENOUS; SUBCUTANEOUS at 16:02

## 2020-11-03 RX ADMIN — INSULIN GLARGINE 10 UNITS: 100 INJECTION, SOLUTION SUBCUTANEOUS at 20:15

## 2020-11-03 RX ADMIN — HEPARIN SODIUM 5000 UNITS: 10000 INJECTION INTRAVENOUS; SUBCUTANEOUS at 13:48

## 2020-11-03 RX ADMIN — LABETALOL HYDROCHLORIDE 5 MG: 5 INJECTION INTRAVENOUS at 08:30

## 2020-11-03 RX ADMIN — SODIUM CHLORIDE, PRESERVATIVE FREE 10 ML: 5 INJECTION INTRAVENOUS at 00:44

## 2020-11-03 RX ADMIN — SEVELAMER CARBONATE 800 MG: 800 TABLET, FILM COATED ORAL at 20:16

## 2020-11-03 RX ADMIN — SODIUM CHLORIDE, PRESERVATIVE FREE 100 UNITS: 5 INJECTION INTRAVENOUS at 20:16

## 2020-11-03 RX ADMIN — GENTAMICIN SULFATE: 1 CREAM TOPICAL at 08:19

## 2020-11-03 RX ADMIN — SEVELAMER CARBONATE 800 MG: 800 TABLET, FILM COATED ORAL at 10:53

## 2020-11-03 RX ADMIN — OXYCODONE AND ACETAMINOPHEN 1 TABLET: 5; 325 TABLET ORAL at 20:15

## 2020-11-03 RX ADMIN — METOLAZONE 5 MG: 5 TABLET ORAL at 08:18

## 2020-11-03 RX ADMIN — STANDARDIZED SENNA CONCENTRATE 8.6 MG: 8.6 TABLET ORAL at 20:17

## 2020-11-03 RX ADMIN — SODIUM CHLORIDE, PRESERVATIVE FREE 10 ML: 5 INJECTION INTRAVENOUS at 06:09

## 2020-11-03 RX ADMIN — LISINOPRIL 20 MG: 20 TABLET ORAL at 20:16

## 2020-11-03 RX ADMIN — DEXTROSE MONOHYDRATE 12.5 G: 500 INJECTION PARENTERAL at 06:08

## 2020-11-03 RX ADMIN — SEVELAMER CARBONATE 800 MG: 800 TABLET, FILM COATED ORAL at 16:04

## 2020-11-03 RX ADMIN — BUMETANIDE 2 MG: 1 TABLET ORAL at 20:16

## 2020-11-03 RX ADMIN — OXYCODONE AND ACETAMINOPHEN 1 TABLET: 5; 325 TABLET ORAL at 01:11

## 2020-11-03 ASSESSMENT — PAIN SCALES - GENERAL
PAINLEVEL_OUTOF10: 0
PAINLEVEL_OUTOF10: 8
PAINLEVEL_OUTOF10: 0
PAINLEVEL_OUTOF10: 7
PAINLEVEL_OUTOF10: 3
PAINLEVEL_OUTOF10: 0
PAINLEVEL_OUTOF10: 0

## 2020-11-03 ASSESSMENT — PAIN DESCRIPTION - PROGRESSION: CLINICAL_PROGRESSION: RAPIDLY WORSENING

## 2020-11-03 ASSESSMENT — PAIN - FUNCTIONAL ASSESSMENT: PAIN_FUNCTIONAL_ASSESSMENT: ACTIVITIES ARE NOT PREVENTED

## 2020-11-03 ASSESSMENT — PAIN DESCRIPTION - ORIENTATION: ORIENTATION: ANTERIOR

## 2020-11-03 ASSESSMENT — PAIN DESCRIPTION - DESCRIPTORS: DESCRIPTORS: ACHING;CRAMPING;CONSTANT

## 2020-11-03 ASSESSMENT — PAIN DESCRIPTION - PAIN TYPE: TYPE: ACUTE PAIN

## 2020-11-03 ASSESSMENT — PAIN DESCRIPTION - LOCATION: LOCATION: SHOULDER

## 2020-11-03 ASSESSMENT — PAIN DESCRIPTION - FREQUENCY: FREQUENCY: INTERMITTENT

## 2020-11-03 ASSESSMENT — PAIN DESCRIPTION - ONSET: ONSET: ON-GOING

## 2020-11-03 NOTE — PROGRESS NOTES
Department of Internal Medicine  Infectious Diseases  Progress Note      C/C :   Endocarditis     Pt is awake and alert   Denies fever or chills  Reports nausea     Afebrile     Current Facility-Administered Medications   Medication Dose Route Frequency Provider Last Rate Last Dose    sevelamer (RENVELA) tablet 800 mg  800 mg Oral TID WC Rafia Schroeder MD   800 mg at 11/03/20 1053    lisinopril (PRINIVIL;ZESTRIL) tablet 20 mg  20 mg Oral Nightly Rafia Schroeder MD        dextrose 5 % solution   Intravenous Continuous Quinton Richards MD 50 mL/hr at 11/03/20 0934      dextrose 5 % solution   Intravenous Continuous Rafia Schroeder MD        levothyroxine (SYNTHROID) tablet 75 mcg  75 mcg Oral QAM AC Quinton Richards MD   75 mcg at 11/03/20 0558    labetalol (NORMODYNE;TRANDATE) injection 5 mg  5 mg Intravenous Q6H PRN Henry Sandoval MD   5 mg at 11/03/20 0830    dilTIAZem (CARDIZEM CD) extended release capsule 240 mg  240 mg Oral Daily Rafia Schroeder MD   240 mg at 11/03/20 0819    bumetanide (BUMEX) tablet 2 mg  2 mg Oral BID Rafia Schroeder MD   2 mg at 11/03/20 0818    metOLazone (ZAROXOLYN) tablet 5 mg  5 mg Oral Daily Rafia Schroeder MD   5 mg at 11/03/20 0818    insulin lispro (HUMALOG) injection vial 0-12 Units  0-12 Units Subcutaneous Q4H Rafia Schroeder MD   4 Units at 11/02/20 2057    oxyCODONE-acetaminophen (PERCOCET) 5-325 MG per tablet 0.5 tablet  0.5 tablet Oral Q6H PRN Rafia Schroeder MD        Or    oxyCODONE-acetaminophen (PERCOCET) 5-325 MG per tablet 1 tablet  1 tablet Oral Q6H PRN Rafia Schroeder MD   1 tablet at 11/03/20 1056    hydrALAZINE (APRESOLINE) tablet 25 mg  25 mg Oral 3 times per day Jovan Betancur MD   25 mg at 11/03/20 1348    lidocaine 1 % injection 5 mL  5 mL Intradermal Once Ishan CHRISTELLE Dee MD        sodium chloride flush 0.9 % injection 10 mL  10 mL Intravenous PRN Ishan P Limbu, MD   10 mL at 11/03/20 0609    heparin flush 100 UNIT/ML injection 100 Units  1 mL Intravenous 2 times per day Colette Landry MD   100 Units at 11/03/20 0819    heparin flush 100 UNIT/ML injection 100 Units  1 mL Intracatheter PRN Colette Landry MD   100 Units at 11/03/20 0556    vancomycin (VANCOCIN) oral solution 250 mg  250 mg Oral 4 times per day Colette Landry MD   250 mg at 11/03/20 1053    calcium gluconate 10 % injection 1 g  1 g Intravenous Once Sid Young MD        sodium chloride flush 0.9 % injection 10 mL  10 mL Intravenous 2 times per day Lloa Damon MD   10 mL at 11/03/20 0819    sodium chloride flush 0.9 % injection 10 mL  10 mL Intravenous PRN Lola Damon MD   10 mL at 11/03/20 0555    acetaminophen (TYLENOL) tablet 650 mg  650 mg Oral Q6H PRN Lola Damon MD   650 mg at 11/03/20 1069    Or    acetaminophen (TYLENOL) suppository 650 mg  650 mg Rectal Q6H PRN Lola Damon MD        polyethylene glycol (GLYCOLAX) packet 17 g  17 g Oral Daily PRN Lola Damon MD        promethazine (PHENERGAN) tablet 12.5 mg  12.5 mg Oral Q6H PRN Lola Damon MD        Or    ondansetron (ZOFRAN) injection 4 mg  4 mg Intravenous Q6H PRN Lola Damon MD   4 mg at 11/01/20 1851    heparin (porcine) injection 5,000 Units  5,000 Units Subcutaneous 3 times per day Lola Damon MD   5,000 Units at 11/03/20 1348    glucose (GLUTOSE) 40 % oral gel 15 g  15 g Oral PRN Lola Damon MD        dextrose 50 % IV solution  12.5 g Intravenous PRN Lola Damon MD   12.5 g at 11/03/20 0608    glucagon (rDNA) injection 1 mg  1 mg Intramuscular PRN Lola Damon MD        dextrose 5 % solution  100 mL/hr Intravenous PRN Lola Damon MD        insulin glargine (LANTUS) injection vial 10 Units  10 Units Subcutaneous Nightly Shikha Dietrich MD   10 Units at 11/02/20 2057    gentamicin (GARAMYCIN) 0.1 % cream   Topical Daily Joe Worley MD        epoetin nena-epbx (RETACRIT) injection 5,000 Units  5,000 Units 08/10/2020    LYMPHOPCT 28.0 11/03/2020    MONOPCT 6.6 11/03/2020    BASOPCT 2.0 11/03/2020    MONOSABS 0.45 11/03/2020    LYMPHSABS 1.91 11/03/2020    EOSABS 0.79 11/03/2020    BASOSABS 0.14 11/03/2020       CMP     Lab Results   Component Value Date     11/03/2020    K 3.8 11/03/2020    K 3.7 10/11/2020     11/03/2020    CO2 25 11/03/2020    BUN 46 11/03/2020    CREATININE 7.4 11/03/2020    GFRAA 8 11/03/2020    LABGLOM 8 11/03/2020    GLUCOSE 58 11/03/2020    GLUCOSE 130 05/18/2012    PROT 5.2 11/03/2020    LABALBU 3.0 11/03/2020    LABALBU 4.1 05/18/2012    CALCIUM 8.9 11/03/2020    BILITOT <0.2 11/03/2020    ALKPHOS 117 11/03/2020    AST 23 11/03/2020    ALT 40 11/03/2020         Hepatic Function Panel:    Lab Results   Component Value Date    ALKPHOS 117 11/03/2020    ALT 40 11/03/2020    AST 23 11/03/2020    PROT 5.2 11/03/2020    BILITOT <0.2 11/03/2020    BILIDIR <0.2 10/11/2020    IBILI see below 10/11/2020    LABALBU 3.0 11/03/2020    LABALBU 4.1 05/18/2012       PT/INR:    Lab Results   Component Value Date    PROTIME 10.6 10/31/2020    PROTIME 13.6 08/14/2011    INR 1.0 10/31/2020       TSH:    Lab Results   Component Value Date    TSH 6.000 10/31/2020       U/A:    Lab Results   Component Value Date    COLORU Yellow 11/01/2020    PHUR 8.0 11/01/2020    LABCAST RARE 01/12/2020    WBCUA NONE 11/01/2020    WBCUA 0-1 05/18/2012    RBCUA 2-5 11/01/2020    RBCUA 1-3 08/01/2013    MUCUS Present 02/12/2016    TRICHOMONAS Present 07/17/2020    YEAST RARE 05/24/2018    BACTERIA FEW 11/01/2020    CLARITYU Clear 11/01/2020    SPECGRAV 1.020 11/01/2020    LEUKOCYTESUR Negative 11/01/2020    UROBILINOGEN 0.2 11/01/2020    BILIRUBINUR Negative 11/01/2020    BILIRUBINUR SMALL 05/18/2012    BLOODU SMALL 11/01/2020    GLUCOSEU 250 11/01/2020    GLUCOSEU >=1000 05/18/2012    AMORPHOUS RARE 11/01/2019       ABG:    Lab Results   Component Value Date    THP5RSK 22.1 10/29/2019    B3YWYYCG 97.7 09/08/2012 PHART 7.345 07/20/2012    UVU9IQD 35.0 10/29/2019    PO2ART 91.5 10/29/2019       MICROBIOLOGY:    Blood culture - Staph epidermidis  ( 10/8)     Vanco random level 22.9    YULISSA -     Summary    Ejection fraction is visually estimated at 60-65%.    There is a right atrial mobile mass approximately 2.4 cm x 0.4 cm, which    may represent a vegetation or mobile thrombus.    It attaches to the atrial septum superior to the fossa ovalis, at the    junction of the SVC and the RA.    Previously seen central venous catheter and associated mass are no longer    seen on this study.    Mild mitral regurgitation.    Mild tricuspid regurgitation.    No evidence of valvular vegetations       IMPRESSION:     1. 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 C diff infection ( at [facility] on 10/25) - diarrhea stopped     RECOMMENDATIONS:      1. Vancomycin random level   2. Oral vancomycin 250 mg po q 6 hrs   3. OR for clot removal

## 2020-11-03 NOTE — PROGRESS NOTES
Message sent to [facility] regarding critical phosphorus level of 9.1, also made them aware patient is having active diarrhea and being treated for cdiff, awaiting call back.

## 2020-11-03 NOTE — PROGRESS NOTES
[facility]hy[facility]  Internal Medicine Residency Program  Progress Note - House Team 1    Patient:  Urban Skdary 31 y.o. female MRN: [id number]     Date of Service: 11/2/2020     CC: Extremity fatigue and chest discomfort  Overnight events: No acute event overnight    Subjective     Patient seen and examined at bedside this AM.    Patient lying in bed, appears no distress. She reports whole body pain and dyspnea on exertion. Denies fever, chills, shortness breath, cough, nausea, vomiting, or diarrhea. Objective     Physical Exam:  · Vitals: BP (!) 168/94   Pulse 92   Temp 97 °F (36.1 °C)   Resp 14   Ht 5' 4\" (1.626 m)   Wt 145 lb 8.1 oz (66 kg)   LMP 07/01/2020   SpO2 94%   BMI 24.98 kg/m²     · I & O - 24hr: No intake/output data recorded. · General Appearance: alert, appears stated age, cooperative and fatigued  · HEENT:  Head: Normal, normocephalic, atraumatic. · Eye: PERRL, EMOI  · Neck: no adenopathy, supple, symmetrical, trachea midline and thyroid not enlarged, symmetric, no tenderness/mass/nodules  · Lung: clear to auscultation bilaterally and No respiratory distress  · Heart: normal rate, regular rhythm, S1 and S2 normal, 2/6 systolic murmurs noted  · Abdomen: soft, non-tender; bowel sounds normal; no masses,  no organomegaly and HD catheter in place  · Extremities:  extremities normal, atraumatic, no cyanosis or edema  · Musculokeletal: No joint swelling, no muscle tenderness. ROM normal in all joints of extremities.    · Neurologic: Mental status: Alert, oriented, thought content appropriate  Subject  Pertinent Labs & Imaging Studies   jorge alberto  CBC with Differential:    Lab Results   Component Value Date    WBC 6.0 11/02/2020    RBC 3.25 11/02/2020    HGB 9.7 11/02/2020    HCT 31.0 11/02/2020     11/02/2020    MCV 95.4 11/02/2020    MCH 29.8 11/02/2020    MCHC 31.3 11/02/2020    RDW 16.4 11/02/2020    NRBC 0.9 08/10/2020    SEGSPCT 67 06/27/2013    METASPCT 1.7 08/10/2020    LYMPHOPCT 30.2 11/02/2020    MONOPCT 7.5 11/02/2020    BASOPCT 1.7 11/02/2020    MONOSABS 0.45 11/02/2020    LYMPHSABS 1.81 11/02/2020    EOSABS 0.66 11/02/2020    BASOSABS 0.10 11/02/2020     CMP:    Lab Results   Component Value Date     11/02/2020    K 4.4 11/02/2020    K 3.7 10/11/2020     11/02/2020    CO2 20 11/02/2020    BUN 56 11/02/2020    CREATININE 7.7 11/02/2020    GFRAA 8 11/02/2020    LABGLOM 8 11/02/2020    GLUCOSE 131 11/02/2020    GLUCOSE 130 05/18/2012    PROT 4.9 11/02/2020    LABALBU 2.8 11/02/2020    LABALBU 4.1 05/18/2012    CALCIUM 8.6 11/02/2020    BILITOT <0.2 11/02/2020    ALKPHOS 109 11/02/2020    AST 33 11/02/2020    ALT 45 11/02/2020       Resident's Assessment and Plan     [facility] Erica is a 31 y.o. female with PMHx of ESRD on PD, T1DM, HTN, hypothyroidism, chronic diarrhea, opioid abuse, who presented for concerns of extremely fatigue and chest discomfort. 1.  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 Large 1 x 1 cm, irregular echogenic mass attached to the tip of the CVC suggestive of vegetation or thrombus. -CXR showing patchy opacities in the bilateral lower lungs which could represent atelectasis versus airspace disease process. -Mediport removed   -Cardiothoracic surgery consulted. Need to repeat YULISSA to assess for vegetation to decide antibiotics vs AngioVac vs sternotomy per thoracic surgeon.    -Cardiology consulted. -ID consulted. Appreciate Abx recs -vancomycin 1 g IV x1   -YULISSA on 11/2/2020 reveals a right atrial mobile mass approximately 2.4 cm x 0.4 cm which appears to attach to the atrial septum at the junction of the SVC and RA. No evidence of valvular vegetations.  LVEF 60 to 65%. Mild TR. Mild MR.    2.  Type 1 diabetes mellitus, uncontrolled   -On Lantus 12 unit daily, Humalog 5 unit pre-meal at home   -Currently on Lantus 10 units daily, and a medium dose sliding scale every 3 hours   -POCT glucose every 4 hours    3. ESRD 2/2 diabetic neuropathy on PD   -Nephrology consulted, appreciate recs   -Continue CCPD   -Continue bumex, metolazone, lisinopril, cardizem per nephrology   -EPO 5000 units 3 times week    4. Hypertension   -On clonidine 0.1 mg twice daily as needed at home   -Currently on hydralazine 25 mg 3 times daily, lisinopril 20 mg daily, Cardizem 240 mg daily   -Continue to monitor BP     5. Chronic diarrhea most likely 2/2 diabetic enteropathy, microscopic colitis   -Concern for C. Diff at [facility], PCR positive but EIA negative   -C. diff negative this admission   -Continue PO vancomycin 250 mg 4 times daily for total of 10 days    6. Hypothyroidism   -TSH 6, free T3 1.3, free T4 0.78   -Increase levothyroxine to 75 mg daily    7. History of substance use    PT/OT evaluation: Not indicated at this time  DVT prophylaxis/ GI prophylaxis: Heparin/Pepcid  Disposition: Continue inpatient management    Lois Johnson MD, PGY-1  Attending physician: Dr. Mounika Olson  Department of Internal Medicine  Internal Medicine Residency / [address] [facility]    Attending Physician Statement    [address] [facility] case was discussed, including pertinent history and examination findings with the multidisciplinary team during bedside rounds. I have seen and examined the patient and the key elements of the encounter have been performed by myself. I have read and reviewed the documentation. If needed or disputed the following findings, counseling and treatment options which have been corrected and communicated back to the patient, family if applicable and contributing physicians. I agree with the assessment, plan and orders as noted by the resident.       Fuad Tom Marv Masterson  Professor of Medicine  Pulmonary, [facility] Medicine  Internal Medicine Academic Faculty
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
## 2020-11-03 NOTE — FLOWSHEET NOTE
11/03/20 0815   Vitals   BP (!) 168/103   Peritoneal Dialysis Catheter Left lower abdomen   Placement Date/Time: 10/31/20 0704   Catheter Location: Left lower abdomen   Status Deaccessed   Site Condition No Complications   Dressing Status Clean;Dry; Intact   Dressing Gauze   Peritoneal Dialysis (CAPD manual)   Effluent Appearance Clear;Light;Yellow   Cycler   Ultrafiltration (UF) (mL) 1081 mL

## 2020-11-03 NOTE — PROGRESS NOTES
Comprehensive Nutrition Assessment    Type and Reason for Visit:  Initial, Positive Nutrition Screen    Nutrition Recommendations/Plan: Continue current diet, Nepro BID    Nutrition Assessment:  Pt is at nutritional risk d/t noted chronic diarrhea w/ decreased PO intakes PTA. Pt w/ ESRD on PD. Will modify ONS and monitor. Malnutrition Assessment:  Malnutrition Status:  Insufficient data    Context:  Chronic Illness     Findings of the 6 clinical characteristics of malnutrition:  Energy Intake:  7 - 75% or less estimated energy requirements for 1 month or longer  Weight Loss:  Unable to assess(d/t fluids/fluctuations)     Body Fat Loss:  Unable to assess     Muscle Mass Loss:  Unable to assess    Fluid Accumulation:  No significant fluid accumulation     Strength:  Not Performed    Estimated Daily Nutrient Needs:  Energy (kcal):  5569-1863; Weight Used for Energy Requirements:  Current     Protein (g):  85-95; Weight Used for Protein Requirements:  Current(1.3-1.5)        Fluid (ml/day):  7290-0573; Method Used for Fluid Requirements:  Standard Renal      Nutrition Related Findings:  A&Ox4, active BS, diarrhea, trace edema, -I/Os, noted phos 8.0      Wounds:  None       Current Nutrition Therapies:    DIET RENAL;  Dietary Nutrition Supplements: Low Calorie High Protein Supplement    Anthropometric Measures:  · Height: 5' 4\" (162.6 cm)  · Current Body Weight: 142 lb (64.4 kg)   · Admission Body Weight: 147 lb (66.7 kg)(11/1 standing scale)    · Usual Body Weight: 164 lb (74.4 kg)(1/2020 bed scale, range 129-164# x 1 year back)     · Ideal Body Weight: 120 lbs; % Ideal Body Weight 118.3 %   · BMI: 24.4  · BMI Categories: Normal Weight (BMI 18.5-24. 9)       Nutrition Diagnosis:   · Inadequate oral intake related to altered GI function(chronic diarrhea) as evidenced by intake 26-50%, poor intake prior to admission    Nutrition Interventions:   Food and/or Nutrient Delivery:  Continue Current Diet, Modify Oral Nutrition Supplement(Nepro BID)  Nutrition Education/Counseling:  No recommendation at this time   Coordination of Nutrition Care:  Continue to monitor while inpatient    Goals:  pt to consume >75% meals/ONS       Nutrition Monitoring and Evaluation:   Food/Nutrient Intake Outcomes:  Supplement Intake, Food and Nutrient Intake  Physical Signs/Symptoms Outcomes:  Biochemical Data, Diarrhea, GI Status, Fluid Status or Edema, Nutrition Focused Physical Findings, Skin, Weight     Electronically signed by Nithin Polanco MS, RD, LD on 11/3/20 at 12:09 PM EST    Contact: [phone]

## 2020-11-03 NOTE — CARE COORDINATION
Nursing notes reviewed and accepted per [facility].    CHIEF COMPLAINT:  Cough, eye drainage and congestion.  Rash on face.      SUBJECTIVE:  The patient is a 5-year-old patient Dr. Rojas's seen today with mom Lalita. She has multiple complaints today. Today mom noticed that her left eye looked red and then throughout the day and there has been drainage from it that is purulent. She also noticed that she has 2 crusted scabs on her face. They've been there for a few days and mom notes that she started chewing her fingernails and having her hands in her mouth constantly about 2 weeks ago. Mom believes she has a sore throat. Appetite is okay though. No complaints of ear pain. She's had a cough for about 2 nights with some nasal congestion and rhinorrhea for a couple of days as well. No fevers. No vomiting. No diarrhea. No abdominal pain.    Mom and sister have strep throat.      PAST MEDICAL,  FAMILY, SOCIAL HISTORY:  Reviewed and accepted as per [facility].  History reviewed. No pertinent past medical history.  History reviewed. No pertinent past surgical history.  family history includes High blood pressure in her father and maternal grandfather; High cholesterol in her father and maternal grandfather; NEGATIVE FAMILY HX OF in her mother; OTHER in her brother and brother.   reports that she has never smoked. She has never used smokeless tobacco.       MEDICATIONS:  Current Outpatient Prescriptions   Medication Sig Dispense Refill   • vitamin - therapeutic multivitamins w/minerals (CENTRUM SILVER,THERA-M) Tab Take 1 tablet by mouth daily.     • triamcinolone (ARISTOCORT) 0.1 % cream Up to TID, Do not use on face 60 g 1     No current facility-administered medications for this visit.           ALLERGIES:  No Known Allergies       OBJECTIVE:  Visit Vitals   • BP 98/58   • Pulse 112   • Temp 98.5 °F (36.9 °C) (Tympanic)   • Ht 3' 3.5\" (1.003 m)   • Wt 17 kg   • BMI 16.85 kg/m2     GENERAL:  Alert, interactive and in no apparent  SOCIAL WORK/CASEMANAGEMENT TRANSITION OF CARE [id number], [address], [place], [phone]): pt lives with her mother and her  1and 3year old children. They have a 2 story home with pt having a bed and bath on the first floor and 4 front steps to enter. No hhc pta. Mom is a rn for [employer]. Pt feels her mother can do the hhc. Pt has a transport w/c, shower bench Chely Altamirano alex is diabetic. She said she test her bs more than 4x's a day. Pt is on capd at home with the cycler use only at night. Pt has no income and has filed for social security but feels they gave her the run around. I rec: a social security . Plan is home and complains of sob when walking and standing. Pulmonary is on the case. Sw/cm to follow.  Henrene Kanner  11/3/2020 distress. Nontoxic and well nourished.  HEAD:  Normocephalic, atraumatic.  EYES: Pupils equally round and reactive to light.  Eyelids are normal. Conjunctivae and sclerae are injected bilaterally. Purulent drainage from the left eye.  EARS:  Normal tympanic membranes, external auditory canals and pinnae bilaterally.  NOSE:  Normal mucosa.  No nasal congestion or discharge.  THROAT:  Moist oral mucosa. Tonsils are 2+ and mildly erythematous. No exudate and no lesions.  NECK:  Supple. No significant anterior or posterior cervical lymphadenopathy.  HEART:  Regular rate and rhythm. No murmurs.  LUNGS:  Clear to auscultation bilaterally with normal respiratory effort.  SKIN:  Chin with 2 scabbed and yellow crusted lesions with an erythematous base.    ASSESSMENT:  Impetigo  Bilateral acute bacterial conjunctivitis  Exposure to strep throat      PLAN:  I prescribed Augmentin ES 6.4 ML p.o. b.i.d. for 10 days. I also prescribed Polytrim ophthalmic drops 1 drop to each eye 3 times a day for 5-7 days. Topical antibiotic ointment to lesions 2-3 times per day.

## 2020-11-03 NOTE — PROGRESS NOTES
CC:endocarditis    Brief HPI:  Patient seen with Dr. Chasidy Galan. Awake, alert. No complaints.       Past Medical History:   Diagnosis Date    Acute congestive heart failure ([facility] [place] [address]     BC (acute kidney injury) ([facility] [place] [address] 10/1/2019    Cephalgia 10/9/2019    Chronic kidney disease     Depression     Diabetes mellitus ([facility] [place] [address]     Diabetic ketoacidosis ([facility] [place] [address] 2011    Hemodialysis patient ([facility] [place] [address]     History of blood transfusion 2019    Hyperlipidemia 10/8/2020    Hypothyroidism 10/8/2020    Iron deficiency anemia 10/1/2019    MDRO (multiple drug resistant organisms) resistance     MRSA (methicillin resistant Staphylococcus aureus)     back wound abcess    Non compliance w medication regimen 3/30/2016    Other disorders of kidney and ureter     Pregnancy 3/30/2016    16 weeks    Severe pre-eclampsia in third trimester 2016     Past Surgical History:   Procedure Laterality Date    BACK SURGERY      abscess     SECTION      x2    CHOLECYSTECTOMY, LAPAROSCOPIC N/A 2019    CHOLECYSTECTOMY LAPAROSCOPIC performed by Delfina Burnette MD at [address] [place] [address] N/A 2018    COLONOSCOPY WITH BIOPSY performed by Gus Marcial MD at [facility] N/A 2018    COLONOSCOPY WITH BIOPSY performed by Irwin Ramachandran MD at [address] [place] [address] ECHO COMPL W [address]  2012    EF 57%    ECHO COMPL W DOP COLOR FLOW  6/10/2013         LAPAROSCOPY INSERTION PERITONEAL CATHETER N/A 2020    LAPAROSCOPIC INSERTION PERITONEAL DIALYSIS CATHETER performed by Isaiah Hernandez MD at [facility] [address] ESOPHAGOGASTRODUODENOSCOPY TRANSORAL DIAGNOSTIC N/A 2018    EGD ESOPHAGOGASTRODUODENOSCOPY performed by Gus Marcial MD at [address] [facility] TRANSESOPHAGEAL ECHOCARDIOGRAM N/A 10/19/2020    TRANSESOPHAGEAL ECHOCARDIOGRAM WITH BUBBLE STUDY performed by Patrick Mayfield MD at [address] [facility] [address] [address]  2018    UPPER GASTROINTESTINAL ENDOSCOPY 1159   BP: (!) 168/103 120/76 (!) 171/95    Pulse:  88 94    Resp:   12    Temp:   97.5 °F (36.4 °C)    TempSrc:   Temporal    SpO2:   99%    Weight:       Height:    5' 4\" (1.626 m)           Intake/Output Summary (Last 24 hours) at 11/3/2020 1241  Last data filed at 11/3/2020 1045  Gross per 24 hour   Intake 442 ml   Output 700 ml   Net -258 ml         Recent Labs     11/01/20  0557 11/02/20  0515 11/03/20  0600   WBC 7.6 6.0 6.8   HGB 9.5* 9.7* 11.0*   HCT 30.6* 31.0* 35.2    261 255      Recent Labs     11/01/20  0557 11/02/20  0515 11/03/20  0600   BUN 64* 56* 46*   CREATININE 7.5* 7.7* 7.4*         ROS:   Negative for CP, palpitations, SOB at rest, dizziness/lightheadedness. Physical Exam   Constitutional: Oriented to person, place, and time. Appears well-developed. No distress. Cardiovascular: Normal rate, regular rhythm and normal heart sounds. Pulmonary/Chest: Effort normal. No respiratory distress. Abdominal: Soft. Bowel sounds are normal.   Musculoskeletal: Normal range of motion. Neurological: alert and oriented to person, place, and time. Skin: Skin is warm and dry. Psychiatric: normal mood and affect. A/P: 30 yo female with hx of right atrial vegetation and mediport infection/vegetation     Repeat YULISSA done  Dr Sharyn Moritz d/w dr Zohaib Sam- likely will be angivac removal; TBD        Note: 25 minutes was spent providing face-to-face patient care, including:  and coordinating care, reviewing the chart, labs, and diagnostics, as well as medical decision making. Greater than 50% of this time was spent instructing and counseling the patient face to face regarding findings and recommendations.

## 2020-11-04 ENCOUNTER — ANESTHESIA EVENT (OUTPATIENT)
Dept: OPERATING ROOM | Age: 30
DRG: 721 | End: 2020-11-04
Payer: COMMERCIAL

## 2020-11-04 LAB
ABO/RH: NORMAL
ALBUMIN SERPL-MCNC: 2.8 G/DL (ref 3.5–5.2)
ALP BLD-CCNC: 115 U/L (ref 35–104)
ALT SERPL-CCNC: 33 U/L (ref 0–32)
ANION GAP SERPL CALCULATED.3IONS-SCNC: 15 MMOL/L (ref 7–16)
ANTIBODY SCREEN: NORMAL
AST SERPL-CCNC: 16 U/L (ref 0–31)
BASOPHILS ABSOLUTE: 0.1 E9/L (ref 0–0.2)
BASOPHILS RELATIVE PERCENT: 1.1 % (ref 0–2)
BILIRUB SERPL-MCNC: <0.2 MG/DL (ref 0–1.2)
BUN BLDV-MCNC: 40 MG/DL (ref 6–20)
CALCIUM SERPL-MCNC: 8.5 MG/DL (ref 8.6–10.2)
CHLORIDE BLD-SCNC: 101 MMOL/L (ref 98–107)
CO2: 25 MMOL/L (ref 22–29)
CREAT SERPL-MCNC: 7.5 MG/DL (ref 0.5–1)
EOSINOPHILS ABSOLUTE: 0.56 E9/L (ref 0.05–0.5)
EOSINOPHILS RELATIVE PERCENT: 6.4 % (ref 0–6)
GFR AFRICAN AMERICAN: 8
GFR NON-AFRICAN AMERICAN: 8 ML/MIN/1.73
GLUCOSE BLD-MCNC: 239 MG/DL (ref 74–99)
HCT VFR BLD CALC: 32.7 % (ref 34–48)
HEMOGLOBIN: 10.3 G/DL (ref 11.5–15.5)
IMMATURE GRANULOCYTES #: 0.03 E9/L
IMMATURE GRANULOCYTES %: 0.3 % (ref 0–5)
LYMPHOCYTES ABSOLUTE: 1.82 E9/L (ref 1.5–4)
LYMPHOCYTES RELATIVE PERCENT: 20.9 % (ref 20–42)
MAGNESIUM: 2.4 MG/DL (ref 1.6–2.6)
MCH RBC QN AUTO: 30 PG (ref 26–35)
MCHC RBC AUTO-ENTMCNC: 31.5 % (ref 32–34.5)
MCV RBC AUTO: 95.3 FL (ref 80–99.9)
METER GLUCOSE: 151 MG/DL (ref 74–99)
METER GLUCOSE: 171 MG/DL (ref 74–99)
METER GLUCOSE: 219 MG/DL (ref 74–99)
METER GLUCOSE: 251 MG/DL (ref 74–99)
METER GLUCOSE: 55 MG/DL (ref 74–99)
METER GLUCOSE: 60 MG/DL (ref 74–99)
METER GLUCOSE: 76 MG/DL (ref 74–99)
METER GLUCOSE: 85 MG/DL (ref 74–99)
METER GLUCOSE: 87 MG/DL (ref 74–99)
METER GLUCOSE: <40 MG/DL (ref 74–99)
MONOCYTES ABSOLUTE: 0.43 E9/L (ref 0.1–0.95)
MONOCYTES RELATIVE PERCENT: 4.9 % (ref 2–12)
NEUTROPHILS ABSOLUTE: 5.76 E9/L (ref 1.8–7.3)
NEUTROPHILS RELATIVE PERCENT: 66.4 % (ref 43–80)
PDW BLD-RTO: 16 FL (ref 11.5–15)
PHOSPHORUS: 8.9 MG/DL (ref 2.5–4.5)
PLATELET # BLD: 272 E9/L (ref 130–450)
PMV BLD AUTO: 10.4 FL (ref 7–12)
POTASSIUM SERPL-SCNC: 4 MMOL/L (ref 3.5–5)
RBC # BLD: 3.43 E12/L (ref 3.5–5.5)
SODIUM BLD-SCNC: 141 MMOL/L (ref 132–146)
TOTAL CK: 99 U/L (ref 20–180)
TOTAL PROTEIN: 5 G/DL (ref 6.4–8.3)
VANCOMYCIN RANDOM: 28.8 MCG/ML (ref 5–40)
WBC # BLD: 8.7 E9/L (ref 4.5–11.5)

## 2020-11-04 PROCEDURE — 80053 COMPREHEN METABOLIC PANEL: CPT

## 2020-11-04 PROCEDURE — 2500000003 HC RX 250 WO HCPCS: Performed by: INTERNAL MEDICINE

## 2020-11-04 PROCEDURE — 82550 ASSAY OF CK (CPK): CPT

## 2020-11-04 PROCEDURE — 99233 SBSQ HOSP IP/OBS HIGH 50: CPT | Performed by: INTERNAL MEDICINE

## 2020-11-04 PROCEDURE — 86900 BLOOD TYPING SEROLOGIC ABO: CPT

## 2020-11-04 PROCEDURE — 85025 COMPLETE CBC W/AUTO DIFF WBC: CPT

## 2020-11-04 PROCEDURE — 6360000002 HC RX W HCPCS: Performed by: INTERNAL MEDICINE

## 2020-11-04 PROCEDURE — 6370000000 HC RX 637 (ALT 250 FOR IP): Performed by: INTERNAL MEDICINE

## 2020-11-04 PROCEDURE — 36592 COLLECT BLOOD FROM PICC: CPT

## 2020-11-04 PROCEDURE — 86901 BLOOD TYPING SEROLOGIC RH(D): CPT

## 2020-11-04 PROCEDURE — 83735 ASSAY OF MAGNESIUM: CPT

## 2020-11-04 PROCEDURE — 2580000003 HC RX 258: Performed by: INTERNAL MEDICINE

## 2020-11-04 PROCEDURE — 90945 DIALYSIS ONE EVALUATION: CPT

## 2020-11-04 PROCEDURE — 2140000000 HC CCU INTERMEDIATE R&B

## 2020-11-04 PROCEDURE — 80202 ASSAY OF VANCOMYCIN: CPT

## 2020-11-04 PROCEDURE — 82962 GLUCOSE BLOOD TEST: CPT

## 2020-11-04 PROCEDURE — 86923 COMPATIBILITY TEST ELECTRIC: CPT

## 2020-11-04 PROCEDURE — 86850 RBC ANTIBODY SCREEN: CPT

## 2020-11-04 PROCEDURE — P9047 ALBUMIN (HUMAN), 25%, 50ML: HCPCS | Performed by: INTERNAL MEDICINE

## 2020-11-04 PROCEDURE — 36415 COLL VENOUS BLD VENIPUNCTURE: CPT

## 2020-11-04 PROCEDURE — 84100 ASSAY OF PHOSPHORUS: CPT

## 2020-11-04 RX ORDER — INSULIN GLARGINE 100 [IU]/ML
8 INJECTION, SOLUTION SUBCUTANEOUS NIGHTLY
Status: DISCONTINUED | OUTPATIENT
Start: 2020-11-04 | End: 2020-11-09

## 2020-11-04 RX ORDER — LABETALOL HYDROCHLORIDE 5 MG/ML
10 INJECTION, SOLUTION INTRAVENOUS EVERY 4 HOURS PRN
Status: DISCONTINUED | OUTPATIENT
Start: 2020-11-04 | End: 2020-11-11 | Stop reason: HOSPADM

## 2020-11-04 RX ORDER — DIPHENHYDRAMINE HYDROCHLORIDE 50 MG/ML
50 INJECTION INTRAMUSCULAR; INTRAVENOUS ONCE
Status: COMPLETED | OUTPATIENT
Start: 2020-11-04 | End: 2020-11-04

## 2020-11-04 RX ORDER — LIDOCAINE 4 G/G
1 PATCH TOPICAL DAILY
Status: DISCONTINUED | OUTPATIENT
Start: 2020-11-04 | End: 2020-11-11 | Stop reason: HOSPADM

## 2020-11-04 RX ORDER — HYDRALAZINE HYDROCHLORIDE 20 MG/ML
10 INJECTION INTRAMUSCULAR; INTRAVENOUS ONCE
Status: COMPLETED | OUTPATIENT
Start: 2020-11-04 | End: 2020-11-04

## 2020-11-04 RX ORDER — HYDRALAZINE HYDROCHLORIDE 20 MG/ML
10 INJECTION INTRAMUSCULAR; INTRAVENOUS EVERY 4 HOURS PRN
Status: DISCONTINUED | OUTPATIENT
Start: 2020-11-04 | End: 2020-11-11 | Stop reason: HOSPADM

## 2020-11-04 RX ORDER — METOPROLOL TARTRATE 5 MG/5ML
5 INJECTION INTRAVENOUS ONCE
Status: COMPLETED | OUTPATIENT
Start: 2020-11-04 | End: 2020-11-04

## 2020-11-04 RX ORDER — DIPHENHYDRAMINE HYDROCHLORIDE 50 MG/ML
50 INJECTION INTRAMUSCULAR; INTRAVENOUS ONCE
Status: DISCONTINUED | OUTPATIENT
Start: 2020-11-04 | End: 2020-11-04

## 2020-11-04 RX ORDER — MORPHINE SULFATE 2 MG/ML
1 INJECTION, SOLUTION INTRAMUSCULAR; INTRAVENOUS EVERY 4 HOURS PRN
Status: COMPLETED | OUTPATIENT
Start: 2020-11-04 | End: 2020-11-04

## 2020-11-04 RX ORDER — ALBUMIN (HUMAN) 12.5 G/50ML
25 SOLUTION INTRAVENOUS 2 TIMES DAILY
Status: COMPLETED | OUTPATIENT
Start: 2020-11-04 | End: 2020-11-06

## 2020-11-04 RX ORDER — LORAZEPAM 2 MG/ML
1 INJECTION INTRAMUSCULAR ONCE
Status: COMPLETED | OUTPATIENT
Start: 2020-11-04 | End: 2020-11-04

## 2020-11-04 RX ADMIN — Medication 15 G: at 08:36

## 2020-11-04 RX ADMIN — TRIMETHOBENZAMIDE HYDROCHLORIDE 200 MG: 100 INJECTION INTRAMUSCULAR at 11:21

## 2020-11-04 RX ADMIN — HEPARIN SODIUM 5000 UNITS: 10000 INJECTION INTRAVENOUS; SUBCUTANEOUS at 20:00

## 2020-11-04 RX ADMIN — ALBUMIN (HUMAN) 25 G: 0.25 INJECTION, SOLUTION INTRAVENOUS at 16:10

## 2020-11-04 RX ADMIN — SEVELAMER CARBONATE 800 MG: 800 TABLET, FILM COATED ORAL at 08:30

## 2020-11-04 RX ADMIN — METOLAZONE 5 MG: 5 TABLET ORAL at 08:30

## 2020-11-04 RX ADMIN — Medication 10 ML: at 08:29

## 2020-11-04 RX ADMIN — SODIUM CHLORIDE, PRESERVATIVE FREE 10 ML: 5 INJECTION INTRAVENOUS at 02:42

## 2020-11-04 RX ADMIN — HYDRALAZINE HYDROCHLORIDE 25 MG: 25 TABLET, FILM COATED ORAL at 20:45

## 2020-11-04 RX ADMIN — DEXTROSE MONOHYDRATE: 50 INJECTION, SOLUTION INTRAVENOUS at 23:27

## 2020-11-04 RX ADMIN — HYDRALAZINE HYDROCHLORIDE 10 MG: 20 INJECTION, SOLUTION INTRAMUSCULAR; INTRAVENOUS at 20:53

## 2020-11-04 RX ADMIN — STANDARDIZED SENNA CONCENTRATE 8.6 MG: 8.6 TABLET ORAL at 20:00

## 2020-11-04 RX ADMIN — OXYCODONE AND ACETAMINOPHEN 1 TABLET: 5; 325 TABLET ORAL at 11:37

## 2020-11-04 RX ADMIN — EPOETIN ALFA-EPBX 5000 UNITS: 10000 INJECTION, SOLUTION INTRAVENOUS; SUBCUTANEOUS at 10:07

## 2020-11-04 RX ADMIN — LORAZEPAM 0.5 MG: 0.5 TABLET ORAL at 12:24

## 2020-11-04 RX ADMIN — MORPHINE SULFATE 1 MG: 2 INJECTION, SOLUTION INTRAMUSCULAR; INTRAVENOUS at 13:19

## 2020-11-04 RX ADMIN — BUMETANIDE 2 MG: 1 TABLET ORAL at 08:30

## 2020-11-04 RX ADMIN — GENTAMICIN SULFATE: 1 CREAM TOPICAL at 08:31

## 2020-11-04 RX ADMIN — HYDRALAZINE HYDROCHLORIDE 25 MG: 25 TABLET, FILM COATED ORAL at 05:33

## 2020-11-04 RX ADMIN — ONDANSETRON 4 MG: 2 INJECTION INTRAMUSCULAR; INTRAVENOUS at 09:27

## 2020-11-04 RX ADMIN — LORAZEPAM 1 MG: 2 INJECTION INTRAMUSCULAR; INTRAVENOUS at 20:53

## 2020-11-04 RX ADMIN — Medication 15 G: at 08:17

## 2020-11-04 RX ADMIN — ALBUMIN (HUMAN) 25 G: 0.25 INJECTION, SOLUTION INTRAVENOUS at 22:21

## 2020-11-04 RX ADMIN — LEVOTHYROXINE SODIUM 75 MCG: 0.07 TABLET ORAL at 05:33

## 2020-11-04 RX ADMIN — HEPARIN 100 UNITS: 100 SYRINGE at 02:42

## 2020-11-04 RX ADMIN — HEPARIN 100 UNITS: 100 SYRINGE at 11:18

## 2020-11-04 RX ADMIN — DILTIAZEM HYDROCHLORIDE 240 MG: 240 CAPSULE, EXTENDED RELEASE ORAL at 08:30

## 2020-11-04 RX ADMIN — SEVELAMER CARBONATE 800 MG: 800 TABLET, FILM COATED ORAL at 16:10

## 2020-11-04 RX ADMIN — BUMETANIDE 2 MG: 1 TABLET ORAL at 20:00

## 2020-11-04 RX ADMIN — LABETALOL HYDROCHLORIDE 5 MG: 5 INJECTION INTRAVENOUS at 16:15

## 2020-11-04 RX ADMIN — DEXTROSE MONOHYDRATE: 50 INJECTION, SOLUTION INTRAVENOUS at 08:54

## 2020-11-04 RX ADMIN — OXYCODONE AND ACETAMINOPHEN 1 TABLET: 5; 325 TABLET ORAL at 02:41

## 2020-11-04 RX ADMIN — INSULIN GLARGINE 8 UNITS: 100 INJECTION, SOLUTION SUBCUTANEOUS at 20:00

## 2020-11-04 RX ADMIN — DIPHENHYDRAMINE HYDROCHLORIDE 50 MG: 50 INJECTION, SOLUTION INTRAMUSCULAR; INTRAVENOUS at 13:55

## 2020-11-04 RX ADMIN — SODIUM CHLORIDE, PRESERVATIVE FREE 10 ML: 5 INJECTION INTRAVENOUS at 20:53

## 2020-11-04 RX ADMIN — MORPHINE SULFATE 1 MG: 2 INJECTION, SOLUTION INTRAMUSCULAR; INTRAVENOUS at 19:51

## 2020-11-04 RX ADMIN — ACETAMINOPHEN 650 MG: 325 TABLET ORAL at 23:31

## 2020-11-04 RX ADMIN — HYDRALAZINE HYDROCHLORIDE 25 MG: 25 TABLET, FILM COATED ORAL at 13:55

## 2020-11-04 RX ADMIN — SODIUM CHLORIDE, PRESERVATIVE FREE 100 UNITS: 5 INJECTION INTRAVENOUS at 20:04

## 2020-11-04 RX ADMIN — Medication 10 ML: at 20:00

## 2020-11-04 RX ADMIN — SODIUM CHLORIDE, PRESERVATIVE FREE 100 UNITS: 5 INJECTION INTRAVENOUS at 08:29

## 2020-11-04 RX ADMIN — SEVELAMER CARBONATE 800 MG: 800 TABLET, FILM COATED ORAL at 11:20

## 2020-11-04 RX ADMIN — SODIUM CHLORIDE, PRESERVATIVE FREE 10 ML: 5 INJECTION INTRAVENOUS at 11:18

## 2020-11-04 RX ADMIN — INSULIN LISPRO 1 UNITS: 100 INJECTION, SOLUTION INTRAVENOUS; SUBCUTANEOUS at 20:00

## 2020-11-04 RX ADMIN — OXYCODONE AND ACETAMINOPHEN 1 TABLET: 5; 325 TABLET ORAL at 18:16

## 2020-11-04 RX ADMIN — HEPARIN SODIUM 5000 UNITS: 10000 INJECTION INTRAVENOUS; SUBCUTANEOUS at 05:39

## 2020-11-04 RX ADMIN — METOPROLOL TARTRATE 5 MG: 5 INJECTION INTRAVENOUS at 20:00

## 2020-11-04 RX ADMIN — Medication 15 G: at 08:16

## 2020-11-04 RX ADMIN — INSULIN LISPRO 6 UNITS: 100 INJECTION, SOLUTION INTRAVENOUS; SUBCUTANEOUS at 05:38

## 2020-11-04 RX ADMIN — HEPARIN SODIUM 5000 UNITS: 10000 INJECTION INTRAVENOUS; SUBCUTANEOUS at 16:07

## 2020-11-04 ASSESSMENT — PAIN SCALES - GENERAL
PAINLEVEL_OUTOF10: 10
PAINLEVEL_OUTOF10: 0
PAINLEVEL_OUTOF10: 7
PAINLEVEL_OUTOF10: 0
PAINLEVEL_OUTOF10: 5
PAINLEVEL_OUTOF10: 0
PAINLEVEL_OUTOF10: 7
PAINLEVEL_OUTOF10: 0
PAINLEVEL_OUTOF10: 7
PAINLEVEL_OUTOF10: 0
PAINLEVEL_OUTOF10: 10

## 2020-11-04 ASSESSMENT — PAIN DESCRIPTION - FREQUENCY
FREQUENCY: INTERMITTENT

## 2020-11-04 ASSESSMENT — PAIN DESCRIPTION - PAIN TYPE
TYPE: ACUTE PAIN

## 2020-11-04 ASSESSMENT — PAIN DESCRIPTION - DESCRIPTORS
DESCRIPTORS: ACHING;BURNING;CONSTANT
DESCRIPTORS: ACHING;BURNING;CONSTANT
DESCRIPTORS: ACHING;BURNING;CRAMPING

## 2020-11-04 ASSESSMENT — PAIN DESCRIPTION - ORIENTATION
ORIENTATION: RIGHT

## 2020-11-04 ASSESSMENT — PAIN DESCRIPTION - PROGRESSION
CLINICAL_PROGRESSION: RAPIDLY WORSENING
CLINICAL_PROGRESSION: GRADUALLY IMPROVING

## 2020-11-04 ASSESSMENT — PAIN DESCRIPTION - ONSET
ONSET: PROGRESSIVE
ONSET: ON-GOING

## 2020-11-04 ASSESSMENT — PAIN SCALES - WONG BAKER
WONGBAKER_NUMERICALRESPONSE: 0

## 2020-11-04 ASSESSMENT — LIFESTYLE VARIABLES: SMOKING_STATUS: 1

## 2020-11-04 ASSESSMENT — PAIN DESCRIPTION - LOCATION
LOCATION: SHOULDER
LOCATION: ARM

## 2020-11-04 ASSESSMENT — ENCOUNTER SYMPTOMS: SHORTNESS OF BREATH: 0
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
## 2020-11-04 NOTE — PROGRESS NOTES
[facility]  Internal Medicine Residency Program  Progress Note - House Team 1    Patient:  Efrain Barnett 31 y.o. female MRN: [id number]     Date of Service: 11/3/2020     CC: Extremity fatigue and chest discomfort  Overnight events: Blood glucose down to 50s last night, hypoglycemic protocol initiated. Subjective     Patient seen and examined at bedside this AM.    Complains left shoulder pain, 10 out of 10, aching in nature, not associated with numbness and tingling, nontender, distal pulses and range of motion normal.  Denies fever, chills, chest pain, cough. Objective     Physical Exam:  · Vitals: /78   Pulse 88   Temp 97.3 °F (36.3 °C) (Temporal)   Resp 18   Ht 5' 4\" (1.626 m)   Wt 142 lb 9.6 oz (64.7 kg)   LMP 07/01/2020   SpO2 99%   BMI 24.48 kg/m²     I & O - 24hr: I/O this shift: In: 0   · Out: 300 [Urine:300]   · General Appearance: alert, appears stated age, cooperative and fatigued  · HEENT:  Head: Normal, normocephalic, atraumatic. · Eye: PERRL, EMOI  · Neck: no adenopathy, supple, symmetrical, trachea midline and thyroid not enlarged, symmetric, no tenderness/mass/nodules  · Lung: clear to auscultation bilaterally and No respiratory distress  · Heart: normal rate, regular rhythm, S1 and S2 normal, no murmurs, rubs or gallops  · Abdomen: soft, non-tender; bowel sounds normal; no masses,  no organomegaly and HD catheter in place  · Extremities:  extremities normal, atraumatic, no cyanosis or edema  · Musculokeletal: No joint swelling, no muscle tenderness. ROM normal in all joints of extremities.    · Neurologic: Mental status: Alert, oriented, thought content appropriate  Subject  Pertinent Labs & Imaging Studies   jorge alberto  CBC with Differential:    Lab Results   Component Value Date    WBC 6.8 11/03/2020    RBC 3.73 11/03/2020    HGB 11.0 11/03/2020    HCT 35.2 11/03/2020     11/03/2020    MCV 94.4 11/03/2020    MCH 29.5 11/03/2020    MCHC 31.3 11/03/2020    RDW 16.1 11/03/2020    NRBC 0.9 08/10/2020    SEGSPCT 67 06/27/2013    METASPCT 1.7 08/10/2020    LYMPHOPCT 28.0 11/03/2020    MONOPCT 6.6 11/03/2020    BASOPCT 2.0 11/03/2020    MONOSABS 0.45 11/03/2020    LYMPHSABS 1.91 11/03/2020    EOSABS 0.79 11/03/2020    BASOSABS 0.14 11/03/2020     CMP:    Lab Results   Component Value Date     11/03/2020    K 3.8 11/03/2020    K 3.7 10/11/2020     11/03/2020    CO2 25 11/03/2020    BUN 46 11/03/2020    CREATININE 7.4 11/03/2020    GFRAA 8 11/03/2020    LABGLOM 8 11/03/2020    GLUCOSE 58 11/03/2020    GLUCOSE 130 05/18/2012    PROT 5.2 11/03/2020    LABALBU 3.0 11/03/2020    LABALBU 4.1 05/18/2012    CALCIUM 8.9 11/03/2020    BILITOT <0.2 11/03/2020    ALKPHOS 117 11/03/2020    AST 23 11/03/2020    ALT 40 11/03/2020       Resident's Assessment and Plan     [address] Erica is a 31 y.o. female with PMHx of ESRD on PD, T1DM, HTN, hypothyroidism, chronic diarrhea, opioid abuse, who presented for concerns of extremely fatigue and chest discomfort. 1.  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 Large 1 x 1 cm, irregular echogenic mass attached to the tip of the CVC suggestive of vegetation or thrombus. -CXR showing patchy opacities in the bilateral lower lungs which could represent atelectasis versus airspace disease process. -Mediport removed   -Cardiothoracic surgery consulted. Need to repeat YULISSA to assess for vegetation to decide antibiotics vs AngioVac vs sternotomy per thoracic surgeon.    -Cardiology consulted. -ID consulted.   Appreciate Abx recs -vancomycin 1 g IV x1   -YULISSA on 11/2/2020 reveals a right atrial mobile mass approximately 2.4 cm x 0.4 cm which appears to attach to the atrial septum at the junction of the SVC and RA. No evidence of valvular vegetations. LVEF 60 to 65%. Mild TR. Mild MR.   -Follow up with PA pressure   -Follow PFT   -Monitor vancomycin random level   -Angiovac removal per cardiothoracic surgery    2. Type 1 diabetes mellitus, uncontrolled   -On Lantus 12 unit daily, Humalog 5 unit pre-meal at home    -11/3: Blood glucose labile. Resume D5W, encourage p.o. intake, dietary nutrition supplements   -Currently on Lantus 10 units daily, and a low dose sliding scale 3 times daily   -POCT glucose every 4 hours    3. ESRD 2/2 diabetic neuropathy on PD   -Nephrology consulted, appreciate recs   -Continue CCPD   -Continue bumex, metolazone, lisinopril, cardizem per nephrology   -EPO 5000 units 3 times week    4. Hypertension   -On clonidine 0.1 mg twice daily as needed at home   -Currently on hydralazine 25 mg 3 times daily, lisinopril 20 mg daily, Cardizem 240 mg daily   -Continue to monitor BP     5. Chronic diarrhea most likely 2/2 diabetic enteropathy, microscopic colitis   -Concern for C. Diff at F, PCR positive but EIA negative   -C. diff negative this admission   -Continue PO vancomycin 250 mg 4 times daily for total of 10 days    6. Hypothyroidism   -TSH 6, free T3 1.3, free T4 0.78   -Increase levothyroxine to 75 mg daily    7. History of substance use    PT/OT evaluation: Not indicated at this time  DVT prophylaxis/ GI prophylaxis: Heparin/Pepcid  Disposition: Continue inpatient management    Yelitza Krueger MD, PGY-1  Attending physician: Dr. Janeth Vega  Department of Internal Medicine  Internal Medicine Residency / [address]    Attending Physician Statement    [address] case was discussed, including pertinent history and examination findings with the multidisciplinary team during bedside rounds. I have seen and examined the patient and the key elements of the encounter have been performed by myself.   I have read and reviewed the documentation. If needed or disputed the following findings, counseling and treatment options which have been corrected and communicated back to the patient, family if applicable and contributing physicians. I agree with the assessment, plan and orders as noted by the resident.       Thuy Noland DO , Damon Reyes  Professor of Medicine  Pulmonary, [facility] Sleep Medicine  Internal Medicine Academic Faculty

## 2020-11-04 NOTE — PROGRESS NOTES
This RT in contact with  RN over PFT scheduling. Pt at 0900 today having blood sugar issues. At 36, patient having nausea and after treatment not feeling well. At 1300, patient still not feeling well. Decision was made to hold PFT for today 11-4-2020.

## 2020-11-04 NOTE — FLOWSHEET NOTE
CCPD complete. Multiple occlusion alarms through night, self resolved. 875 clear straw UF.     11/04/20 0704   Vitals   /88   Temp 98.6 °F (37 °C)   Temp Source Temporal   Pulse 89   Resp 16   SpO2 96 %   Weight 140 lb 14 oz (63.9 kg)   Peritoneal Dialysis Catheter Left lower abdomen   Placement Date/Time: 10/31/20 0704   Catheter Location: Left lower abdomen   Status Deaccessed   Site Condition No Complications   Dressing Status Clean;Dry; Intact   Dressing Gauze   Cycler   Verification of Prescription CCPD   Total Volume Programmed 08226 mL   Therapy Time (Hours:Minutes) 10:32   Last Fill Volume 2000 mL   Ultrafiltration (UF) (mL) 875 mL   Lost Dwell Time (Hours:Minutes) 0:35

## 2020-11-04 NOTE — FLOWSHEET NOTE
CCPD initiated without difficulty,cath care per policy/procedure, lines secure and visbile, dressing changed, clean, dry intact dressing applied

## 2020-11-04 NOTE — ANESTHESIA PRE PROCEDURE
Department of Anesthesiology  Preprocedure Note       Name:  Nyla Ryder   Age:  31 y.o.  :  1990                                          MRN:  [id number]         Date:  2020      Surgeon: Kennedi Reddy):  Mylene Fletcher MD    Procedure: TRANSOESOPHAGEAL ECHO CARDIOGRAM    Medications prior to admission:   Prior to Admission medications    Medication Sig Start Date End Date Taking?  Authorizing Provider   Heparin Sod, Porcine, in D5W (HEPARIN, PORCINE,) 100 UNIT/ML SOLN infusion As per nomogram 10/24/20   Kennedy Chou MD   insulin glargine (LANTUS) 100 UNIT/ML injection vial Inject 12 Units into the skin every morning 10/25/20   Kennedy Chou MD   insulin lispro (HUMALOG) 100 UNIT/ML injection vial Inject 5 Units into the skin 3 times daily (with meals) 10/24/20   Kennedy Chou MD   insulin lispro (HUMALOG) 100 UNIT/ML injection vial Inject 0-6 Units into the skin 3 times daily (with meals) **Corrective Low Dose Algorithm**  Glucose: Dose:               No Insulin  140-199 1 Unit  200-249 2 Units  250-299 3 Units  300-349 4 Units  350-399 5 Units  Over 399 6 Units 10/24/20   Kennedy Chou MD   insulin lispro (HUMALOG) 100 UNIT/ML injection vial Inject 0-3 Units into the skin nightly Glucose: Dose:               No Insulin  140-249 1 Unit  250-349 2 Units  Over 350 3 Units 10/24/20   Kennedy Chou MD   lactobacillus (CULTURELLE) CAPS capsule Take 1 capsule by mouth every 12 hours 10/24/20   Kennedy Chou MD   Insulin Syringes, Disposable, U-100 0.3 ML MISC To use daily 10/9/20   Violet Bruce MD   metOLazone (ZAROXOLYN) 5 MG tablet Take 5 mg by mouth daily    Historical Provider, MD   vitamin D (ERGOCALCIFEROL) 1.25 MG (87833 UT) CAPS capsule Take 50,000 Units by mouth once a week Saturday    Historical Provider, MD   Multiple Vitamins-Minerals (RENAPLEX-D) TABS Take 1 tablet by mouth daily    Historical Provider, MD   cloNIDine (CATAPRES) 0.1 MG tablet Take 0.1 mg by mouth 2 times daily as needed for High Blood Pressure    Historical Provider, MD   levothyroxine (SYNTHROID) 50 MCG tablet Take 1 tablet by mouth Daily 9/3/20   Cyrilla Boast, MD   atorvastatin (LIPITOR) 40 MG tablet Take 1 tablet by mouth nightly 9/3/20   Cyrilla Boast, MD   dilTIAZem (CARDIZEM CD) 240 MG extended release capsule Take 1 capsule by mouth daily 9/3/20   Cyrilla Boast, MD   blood glucose test strips (ASCENSIA AUTODISC VI;ONE TOUCH ULTRA TEST VI) strip Indications: 5x a day Freestyle Lite -Tests 5 times daily and as needed for low glucose. E10.10 7/29/20   Cyrilla Boast, MD   Lancets Thin MISC Indications: cks 5xa a day cks 5xa a day 7/29/20   Cyrilla Boast, MD   epoetin nena-epbx (RETACRIT) 4000 UNIT/ML SOLN injection Inject 2 mLs into the skin three times a week Per Nephrology 2/17/20   HEBER Abdi CNP   LORazepam (ATIVAN) 0.5 MG tablet Take 0.5 mg by mouth 2 times daily as needed for Anxiety. Historical Provider, MD   glucose (GLUTOSE) 40 % GEL Take 15 g by mouth as needed 4/1/16   Sri Brown MD   Insulin Syringe-Needle U-100 (INSULIN SYRINGE .5CC/30GX1/2\") 30G X 1/2\" 0.5 ML MISC by Does not apply route.  Indications: uses 6-7 a day    Historical Provider, MD       Current medications:    Current Facility-Administered Medications   Medication Dose Route Frequency Provider Last Rate Last Dose    insulin regular (HUMULIN R;NOVOLIN R) injection 5 Units  5 Units Subcutaneous Once Yolanda Chaney MD        potassium chloride 10 mEq/100 mL IVPB (Peripheral Line)  10 mEq Intravenous Q1H Yolanda Chaney MD        insulin lispro (HUMALOG) injection vial 0-12 Units  0-12 Units Subcutaneous Q4H Maya Yanes MD        ceFAZolin (ANCEF) 2 g in sterile water 20 mL IV syringe  2 g Intravenous See Admin Instructions HEBER Veloz CNP        insulin glargine (LANTUS) injection vial 8 Units  8 Units Subcutaneous Nightly Maya Yanes MD   8 Units at 11/04/20 2000    albumin human 25 % IV solution 25 g  25 g Intravenous BID Miri Cao MD   Stopped at 11/05/20 2301    lidocaine 4 % external patch 1 patch  1 patch Transdermal Daily Sunshine Godoy MD   1 patch at 11/04/20 1321    hydrALAZINE (APRESOLINE) injection 10 mg  10 mg Intravenous Q4H PRN Prakash Hinton MD        labetalol (NORMODYNE;TRANDATE) injection 10 mg  10 mg Intravenous Q4H PRN Prakash Hinton MD   10 mg at 11/05/20 2347    sevelamer (RENVELA) tablet 800 mg  800 mg Oral TID WC Sunshine Godoy MD   Stopped at 11/05/20 0813    senna (SENOKOT) tablet 8.6 mg  1 tablet Oral Nightly Sunshine Godoy MD   8.6 mg at 11/05/20 2102    levothyroxine (SYNTHROID) tablet 75 mcg  75 mcg Oral QAM AC Shi Szymanski MD   75 mcg at 11/06/20 0540    dilTIAZem (CARDIZEM CD) extended release capsule 240 mg  240 mg Oral Daily Sunshine Godoy MD   240 mg at 11/05/20 1010    bumetanide (BUMEX) tablet 2 mg  2 mg Oral BID Sunshine Godoy MD   2 mg at 11/05/20 2102    metOLazone (ZAROXOLYN) tablet 5 mg  5 mg Oral Daily Sunshine Godoy MD   5 mg at 11/05/20 1039    oxyCODONE-acetaminophen (PERCOCET) 5-325 MG per tablet 0.5 tablet  0.5 tablet Oral Q6H PRN Sunshine Godoy MD        Or    oxyCODONE-acetaminophen (PERCOCET) 5-325 MG per tablet 1 tablet  1 tablet Oral Q6H PRN Sunshine Godoy MD   1 tablet at 11/05/20 2000    hydrALAZINE (APRESOLINE) tablet 25 mg  25 mg Oral 3 times per day Trina Pena MD   25 mg at 11/06/20 0536    lidocaine 1 % injection 5 mL  5 mL Intradermal Once Angelo Coyne MD        sodium chloride flush 0.9 % injection 10 mL  10 mL Intravenous PRN Ishan Dee MD   10 mL at 11/03/20 2015    heparin flush 100 UNIT/ML injection 100 Units  1 mL Intravenous 2 times per day Angelo Coyne MD   100 Units at 11/05/20 1039    heparin flush 100 UNIT/ML injection 100 Units  1 mL Intracatheter PRN James Dee MD   100 Units at 11/05/20 0029    vancomycin (VANCOCIN) oral solution 250 mg  250 mg Oral 4 times per day Loki Lee MD   250 mg at 11/06/20 0537    calcium gluconate 10 % injection 1 g  1 g Intravenous Once Waleska Bhatia MD        sodium chloride flush 0.9 % injection 10 mL  10 mL Intravenous 2 times per day Gopal Correa MD   10 mL at 11/05/20 2103    sodium chloride flush 0.9 % injection 10 mL  10 mL Intravenous PRN Gopal Correa MD   10 mL at 11/05/20 0029    acetaminophen (TYLENOL) tablet 650 mg  650 mg Oral Q6H PRN Gopal Correa MD   650 mg at 11/04/20 2331    Or    acetaminophen (TYLENOL) suppository 650 mg  650 mg Rectal Q6H PRN Gopal Correa MD        polyethylene glycol (GLYCOLAX) packet 17 g  17 g Oral Daily PRN Gopal Correa MD        promethazine (PHENERGAN) tablet 12.5 mg  12.5 mg Oral Q6H PRN Gopal Correa MD        Or    ondansetron (ZOFRAN) injection 4 mg  4 mg Intravenous Q6H PRN Gopal Correa MD   4 mg at 11/05/20 1703    heparin (porcine) injection 5,000 Units  5,000 Units Subcutaneous 3 times per day Gopal Correa MD   Stopped at 11/06/20 0738    glucose (GLUTOSE) 40 % oral gel 15 g  15 g Oral PRN Gopal Correa MD   15 g at 11/04/20 0836    dextrose 50 % IV solution  12.5 g Intravenous PRN Gopal Correa MD   12.5 g at 11/03/20 0608    glucagon (rDNA) injection 1 mg  1 mg Intramuscular PRN Gopal Correa MD        dextrose 5 % solution  100 mL/hr Intravenous PRN Gopal Correa MD   Stopped at 11/04/20 6062    gentamicin (GARAMYCIN) 0.1 % cream   Topical Daily Joe Worley MD        epoetin nena-epbx (RETACRIT) injection 5,000 Units  5,000 Units Subcutaneous Once per day on Mon Wed Fri Joe Infante MD   5,000 Units at 11/04/20 1007    famotidine (PEPCID) tablet 20 mg  20 mg Oral Daily Ronda Jeffries MD   Stopped at 11/05/20 4380    LORazepam (ATIVAN) tablet 0.5 mg  0.5 mg Oral BID PRN Ronda Jeffries MD   0.5 mg at 11/05/20 9022       Allergies:     Allergies   Allergen Reactions    Toradol [Ketorolac Tromethamine] Anaphylaxis and Hives       Problem List:    Patient Active Problem List   Diagnosis Code    High-risk pregnancy O09.90    Previous stillbirth or demise, antepartum O09.299    Non compliance w medication regimen Z91.14    Tobacco smoking complicating pregnancy C90.080    Drug use complicating pregnancy in third trimester O99.323    MTHFR mutation ([facility] E72.12    Poorly controlled type 1 diabetes mellitus ([facility] E10.65    Diabetes mellitus type 1, uncontrolled ([facility] E10.65    Previous  delivery affecting pregnancy, antepartum O34.219    Opioid abuse, episodic ([facility] F11.10    Tobacco use Z72.0    Hypertension I10    Type 1 diabetes mellitus with other specified complication ([facility] X68.25    Diabetic gastroparesis associated with type 1 diabetes mellitus (HCC) E10.43, K31.84    Iron deficiency anemia D50.9    Sinus tachycardia R00.0    Bladder dysfunction N31.9    Acute heart failure with preserved ejection fraction (HCC) I50.31    Chronic kidney disease N18.9    Severe protein-calorie malnutrition (HCC) E43    ESRD (end stage renal disease) (HCC) N18.6    Uncontrolled type 1 diabetes mellitus with hyperglycemia, with long-term current use of insulin (HCC) E10.65    ESRD on peritoneal dialysis ([facility] N18.6, Z99.2    Hypothyroidism E03.9    Hyperlipidemia E78.5    Acute cystitis without hematuria N30.00    Nondisplaced fracture of neck of fifth metacarpal bone, left hand, initial encounter for closed fracture S62.367A    Acute encephalopathy G93.40    Effusion of left knee M25.462    Acute metabolic encephalopathy H12.15    Chronic right-sided low back pain M54.5, G89.29    Endocarditis I38    Chronic diarrhea K52.9       Past Medical History:        Diagnosis Date    Acute congestive heart failure (HCC)     BC (acute kidney injury) ([facility] [facility] 10/1/2019    Cephalgia 10/9/2019    Chronic kidney disease     Depression     Diabetes mellitus ([facility]     Diabetic ketoacidosis ([facility] [facility] 2011    Hemodialysis patient ([facility] [address]     History of blood transfusion 2019    Hyperlipidemia 10/8/2020    Hypothyroidism 10/8/2020    Iron deficiency anemia 10/1/2019    MDRO (multiple drug resistant organisms) resistance     MRSA (methicillin resistant Staphylococcus aureus)     back wound abcess    Non compliance w medication regimen 3/30/2016    Other disorders of kidney and ureter     Pregnancy 3/30/2016    16 weeks    Severe pre-eclampsia in third trimester 2016       Past Surgical History:        Procedure Laterality Date    BACK SURGERY      abscess     SECTION      x2    CHOLECYSTECTOMY, LAPAROSCOPIC N/A 2019    CHOLECYSTECTOMY LAPAROSCOPIC performed by Osvaldo Garner MD at [address] [facility] N/A 2018    COLONOSCOPY WITH BIOPSY performed by Ayse Corea MD at [address] [facility] N/A 2018    COLONOSCOPY WITH BIOPSY performed by Cleopatra Blanco MD at [address]  2012    EF 57%    ECHO COMPL W DOP COLOR FLOW  6/10/2013         LAPAROSCOPY INSERTION PERITONEAL CATHETER N/A 2020    LAPAROSCOPIC INSERTION PERITONEAL DIALYSIS CATHETER performed by Boy Dorsey MD at [address] [facility] ESOPHAGOGASTRODUODENOSCOPY TRANSORAL DIAGNOSTIC N/A 2018    EGD ESOPHAGOGASTRODUODENOSCOPY performed by Ayse Corea MD at [address] [facility] TRANSESOPHAGEAL ECHOCARDIOGRAM N/A 10/19/2020    TRANSESOPHAGEAL ECHOCARDIOGRAM WITH BUBBLE STUDY performed by Janette Acevedo MD at [address] [facility] [address] [address]  2018    UPPER GASTROINTESTINAL ENDOSCOPY  2018    EGD BIOPSY performed by Cleopatra Blanco MD at [facility] N/A 10/11/2019    EGD ESOPHAGOGASTRODUODENOSCOPY performed by Angeline Brown DO at [address] History:    Social History     Tobacco Use    Smoking status: Current Every Day Smoker     Packs/day: 0.50     Years: 6.00 Pack years: 3.00     Types: Cigarettes    Smokeless tobacco: Current User   Substance Use Topics    Alcohol use: No                                Ready to quit: Not Answered  Counseling given: Not Answered      Vital Signs (Current):   Vitals:    11/06/20 0101 11/06/20 0338 11/06/20 0542 11/06/20 0830   BP: (!) 154/78 (!) 154/88  (!) 144/80   Pulse:  109  101   Resp:  16  16   Temp:  97.8 °F (36.6 °C)  97.5 °F (36.4 °C)   TempSrc:  Temporal  Temporal   SpO2:  98%  96%   Weight:   136 lb 12.8 oz (62.1 kg)    Height:                                                  BP Readings from Last 3 Encounters:   11/06/20 (!) 144/80   11/02/20 119/68   10/24/20 (!) 157/84       NPO Status:   > 8hrs                                                      BMI:   Wt Readings from Last 3 Encounters:   11/06/20 136 lb 12.8 oz (62.1 kg)   10/22/20 143 lb 1.3 oz (64.9 kg)   09/03/20 129 lb (58.5 kg)     Body mass index is 23.48 kg/m². CBC:   Lab Results   Component Value Date    WBC 5.1 11/06/2020    RBC 3.05 11/06/2020    HGB 8.9 11/06/2020    HCT 29.6 11/06/2020    MCV 97.0 11/06/2020    RDW 16.0 11/06/2020     11/06/2020       CMP:   Lab Results   Component Value Date     11/06/2020    K 3.1 11/06/2020    K 3.7 10/11/2020    CL 99 11/06/2020    CO2 24 11/06/2020    BUN 32 11/06/2020    CREATININE 8.0 11/06/2020    GFRAA 7 11/06/2020    LABGLOM 7 11/06/2020    GLUCOSE 311 11/06/2020    GLUCOSE 130 05/18/2012    PROT 5.7 11/06/2020    CALCIUM 9.2 11/06/2020    BILITOT 0.3 11/06/2020    ALKPHOS 101 11/06/2020    AST 34 11/06/2020    ALT 32 11/06/2020       POC Tests: No results for input(s): POCGLU, POCNA, POCK, POCCL, POCBUN, POCHEMO, POCHCT in the last 72 hours.     Coags:   Lab Results   Component Value Date    PROTIME 10.6 10/31/2020    PROTIME 13.6 08/14/2011    INR 1.0 10/31/2020    APTT 30.0 10/31/2020       HCG (If Applicable):   Lab Results   Component Value Date    PREGTESTUR NEGATIVE 11/03/2020    HCG 98571.8 (H) 06/26/2013        ABGs:   Lab Results   Component Value Date    PHART 7.345 07/20/2012    PO2ART 91.5 10/29/2019    SRA0CBX 35.0 10/29/2019    ELA6DVI 22.1 10/29/2019    I7OCRJBR 97.7 09/08/2012        Type & Screen (If Applicable):  Lab Results   Component Value Date    LABABO O 12/29/2011    [address] POSITIVE 12/29/2011       Anesthesia Evaluation  Patient summary reviewed and Nursing notes reviewed no history of anesthetic complications:   Airway: Mallampati: II  TM distance: >3 FB   Neck ROM: full  Mouth opening: > = 3 FB Dental:          Pulmonary: breath sounds clear to auscultation  (+) pneumonia: resolved,  current smoker    (-) shortness of breath                           Cardiovascular:  Exercise tolerance: good (>4 METS),   (+) hypertension: moderate, CHF:, ARIAS:, hyperlipidemia      ECG reviewed  Rhythm: regular  Rate: normal  Echocardiogram reviewed               ROS comment: EKG: Normal Sinus Rhythm 87. ECHO:   Trileaflet aortic valve. Normal leaflet mobility. No aortic stenosis. No aortic regurgitation. Normal left atrium. Interatrial septum not well visualized. Normal left ventricular chamber size. Normal left ventricular systolic function. Visually estimated LVEF is 60 %. No wall motion abnormalities. Normal left atrial pressure. Normal aortic root and ascending aorta. Physiologic and/or trace mitral regurgitation is present. No evidence of hemodynamically significant mitral stenosis. No evidence for hemodynamically significant pericardial effusion. Normal right ventricle structure and function. Normal tricuspid valve structure and function. Neuro/Psych:   (+) headaches:, psychiatric history:depression/anxiety             GI/Hepatic/Renal:   (+) renal disease (DKA): kidney stones, ESRD and dialysis,          ROS comment: Significantly delayed gastric emptying with persistent uptake within the esophagus    .    Endo/Other:    (+) DiabetesType I DM, poorly controlled, using insulin, hypothyroidism, blood dyscrasia (MTHFR mutation ([facility] [address])): anemia:., .                  ROS comment: Port due to poor IV access  Opiod abuse Abdominal:           Vascular: negative vascular ROS. Anesthesia Plan      general     ASA 4     (Midline LUE)  Induction: intravenous. BIS, YULISSA and arterial line  MIPS: Postoperative opioids intended, Prophylactic antiemetics administered and Postoperative trial extubation. Anesthetic plan and risks discussed with patient. Use of blood products discussed with patient whom consented to blood products. Plan discussed with attending and CRNA.

## 2020-11-04 NOTE — PROGRESS NOTES
CC:endocarditis    Brief HPI:  Patient seen with Dr. Jonathon Cuevas. Awake, alert.  States she feels \"terrible\"      Past Medical History:   Diagnosis Date    Acute congestive heart failure ([facility]     BC (acute kidney injury) ([facility] 10/1/2019    Cephalgia 10/9/2019    Chronic kidney disease     Depression     Diabetes mellitus ([facility]     Diabetic ketoacidosis ([facility] [address] [facility] 2011    Hemodialysis patient ([facility]     History of blood transfusion 2019    Hyperlipidemia 10/8/2020    Hypothyroidism 10/8/2020    Iron deficiency anemia 10/1/2019    MDRO (multiple drug resistant organisms) resistance     MRSA (methicillin resistant Staphylococcus aureus)     back wound abcess    Non compliance w medication regimen 3/30/2016    Other disorders of kidney and ureter     Pregnancy 3/30/2016    16 weeks    Severe pre-eclampsia in third trimester 2016     Past Surgical History:   Procedure Laterality Date    BACK SURGERY      abscess     SECTION      x2    CHOLECYSTECTOMY, LAPAROSCOPIC N/A 2019    CHOLECYSTECTOMY LAPAROSCOPIC performed by Shelley Juarez MD at [address] [facility] N/A 2018    COLONOSCOPY WITH BIOPSY performed by Israel Carrasco MD at [address] [facility] COLONOSCOPY N/A 2018    COLONOSCOPY WITH BIOPSY performed by Kristine Marroquin MD at [address] ECHO COMPL [address]  2012    EF 57%    ECHO COMPL W DOP COLOR FLOW  6/10/2013         LAPAROSCOPY INSERTION PERITONEAL CATHETER N/A 2020    LAPAROSCOPIC INSERTION PERITONEAL DIALYSIS CATHETER performed by Celia Young MD at [facility] ESOPHAGOGASTRODUODENOSCOPY TRANSORAL DIAGNOSTIC N/A 2018    EGD ESOPHAGOGASTRODUODENOSCOPY performed by Israel Carrasco MD at [address] TRANSESOPHAGEAL ECHOCARDIOGRAM N/A 10/19/2020    TRANSESOPHAGEAL ECHOCARDIOGRAM WITH BUBBLE STUDY performed by Brodie Lema MD at [address] [place] [address] [facility]  2018    UPPER GASTROINTESTINAL ENDOSCOPY  12/18/2018    EGD BIOPSY performed by Faisal Mejia MD at [address] [facility] N/A 10/11/2019    EGD ESOPHAGOGASTRODUODENOSCOPY performed by Danita Stephenson DO at [place] History     Socioeconomic History    Marital status: Single     Spouse name: Not on file    Number of children: Not on file    Years of education: Not on file    Highest education level: Not on file   Occupational History    Not on file   Social Needs    Financial resource strain: Very hard    Food insecurity     Worry: Never true     Inability: Never true    Transportation needs     Medical: No     Non-medical: No   Tobacco Use    Smoking status: Current Every Day Smoker     Packs/day: 0.50     Years: 6.00     Pack years: 3.00     Types: Cigarettes    Smokeless tobacco: Current User   Substance and Sexual Activity    Alcohol use: No    Drug use: No     Comment: documented prior history of opioid abuse    Sexual activity: Yes     Partners: Male   Lifestyle    Physical activity     Days per week: Not on file     Minutes per session: Not on file    Stress: Not on file   Relationships    Social connections     Talks on phone: Not on file     Gets together: Not on file     Attends [employer] service: Not on file     Active member of club or organization: Not on file     Attends meetings of clubs or organizations: Not on file     Relationship status: Not on file    Intimate partner violence     Fear of current or ex partner: Not on file     Emotionally abused: Not on file     Physically abused: Not on file     Forced sexual activity: Not on file   Other Topics Concern    Not on file   Social History Narrative    Not on file     Family History   Problem Relation Age of Onset    Asthma Mother     Hypertension Mother     High Blood Pressure Mother     Diabetes Mother     Asthma Brother     High Blood Pressure Father        Vitals:    11/03/20 2300 11/04/20 0345 11/04/20 1791 11/04/20 1116   BP: (!) 141/79 127/73 137/88    Pulse: 99 95 89    Resp: 18 16 16    Temp: 97.8 °F (36.6 °C) 98.5 °F (36.9 °C) 98.6 °F (37 °C)    TempSrc: Temporal Temporal Temporal    SpO2: 96% 97% 96% 97%   Weight:   140 lb 14 oz (63.9 kg)    Height:               Intake/Output Summary (Last 24 hours) at 11/4/2020 1149  Last data filed at 11/4/2020 0829  Gross per 24 hour   Intake 431 ml   Output 1375 ml   Net -944 ml         Recent Labs     11/02/20  0515 11/03/20  0600 11/04/20  0600   WBC 6.0 6.8 8.7   HGB 9.7* 11.0* 10.3*   HCT 31.0* 35.2 32.7*    255 272      Recent Labs     11/02/20  0515 11/03/20  0600 11/04/20  0600   BUN 56* 46* 40*   CREATININE 7.7* 7.4* 7.5*         ROS:   Negative for CP, palpitations, SOB at rest, dizziness/lightheadedness. Physical Exam   Constitutional: Oriented to person, place, and time. Appears well-developed. No distress. Cardiovascular: Normal rate, regular rhythm and normal heart sounds. Pulmonary/Chest: Effort normal. No respiratory distress. Abdominal: Soft. Bowel sounds are normal.   Musculoskeletal: Normal range of motion. Neurological: alert and oriented to person, place, and time. Skin: Skin is warm and dry. Psychiatric: normal mood and affect. A/P: 32 yo female with hx of right atrial vegetation and mediport infection/vegetation      angiovac vegectomy tomorrow afternoon        Note: 25 minutes was spent providing face-to-face patient care, including:  and coordinating care, reviewing the chart, labs, and diagnostics, as well as medical decision making. Greater than 50% of this time was spent instructing and counseling the patient face to face regarding findings and recommendations.

## 2020-11-04 NOTE — PROGRESS NOTES
[facility]  Internal Medicine Residency Program  Progress Note - House Team 1    Patient:  Heri Taveras 31 y.o. female MRN: [id number]     Date of Service: 11/4/2020     CC: Fatigue and chest pain  Overnight events: Patient was hypoglycemic in the early hours of this morning, she was given her short-acting lispro with no meals, also D5 water found not to be infusing. Hypoglycemia was treated, with appropriate increase in blood glucose. Patient also complaining of nausea, was given a dose of Phenergan. Subjective     The patient was seen and examined this morning, she claims she feels much better. She however complains of reduced appetite likely due to nausea. She would like to try other nausea medications to see if it helps. She still endorses generalized body pain. During afternoon rounds however, patient complaining of extreme right upper extremity pain following IM Tigan injection. Objective     Physical Exam:  · Vitals: BP (!) 174/104   Pulse 104   Temp 97.9 °F (36.6 °C) (Temporal)   Resp 16   Ht 5' 4\" (1.626 m)   Wt 140 lb 14 oz (63.9 kg)   LMP 07/01/2020   SpO2 97%   BMI 24.18 kg/m²     · I & O - 24hr: No intake/output data recorded. · General Appearance: appears stated age, cooperative and mild distress  · HEENT:  Head: Normocephalic, no lesions, without obvious abnormality. · Neck: no adenopathy, no carotid bruit, no JVD, supple, symmetrical, trachea midline and thyroid not enlarged, symmetric, no tenderness/mass/nodules  · Lung: clear to auscultation bilaterally  · Heart: regular rate and rhythm, S1, S2 normal, no murmur, click, rub or gallop  · Abdomen: soft, non-tender; bowel sounds normal; no masses,  no organomegaly  · Extremities:  Extremities normal no cyanosis, mild 1+ pitting pedal edema up to the knee. · Musculokeletal: No joint swelling, no muscle tenderness. ROM normal in all joints of extremities.    · Neurologic: Mental status: Alert, oriented, thought content appropriate  Subject  Pertinent Labs & Imaging Studies   jorge alberto  CBC:   Lab Results   Component Value Date    WBC 8.7 11/04/2020    RBC 3.43 11/04/2020    HGB 10.3 11/04/2020    HCT 32.7 11/04/2020    MCV 95.3 11/04/2020    MCH 30.0 11/04/2020    MCHC 31.5 11/04/2020    RDW 16.0 11/04/2020     11/04/2020    MPV 10.4 11/04/2020     BMP:    Lab Results   Component Value Date     11/04/2020    K 4.0 11/04/2020    K 3.7 10/11/2020     11/04/2020    CO2 25 11/04/2020    BUN 40 11/04/2020    LABALBU 2.8 11/04/2020    LABALBU 4.1 05/18/2012    CREATININE 7.5 11/04/2020    CALCIUM 8.5 11/04/2020    GFRAA 8 11/04/2020    LABGLOM 8 11/04/2020    GLUCOSE 239 11/04/2020    GLUCOSE 130 05/18/2012       Resident's Assessment and Plan     [address] [facility] is a 31 y.o. 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 S/p catheter removal.  Repeat YULISSA done 11/2/2020 with persistent right atrium mobile mass approximately 2.4 cm x 2.4 cm in the right atrium at the junction of the SVC and RA. ID and cardiothoracic surgery following. Remains on vancomycin. 2. Type I DM-with A1c 8.6. Remains uncontrolled, with labile blood glucose-hypo-/hyperglycemic episodes. Patient remains on D5 water, sliding scale usually with meals. Patient with persistent poor appetite and nausea. 3. Hypothyroidism-on Synthroid. 4. Chronic diarrhea-C. difficile positive at [facility] on 10/25/2020. To complete 14 days course of p.o. Vanco.  5. History of IV drug use. 6. End-stage renal disease on peritoneal dialysis. 7.  Hypertension-on diltiazem, lisinopril, hydralazine, and Lasix. Hyperphosphatemia-likely secondary to end-stage renal disease, on sevelamer. 8. Right upper extremity pain following her IM injection.     Plan  -For surgery tomorrow per CTS, for

## 2020-11-04 NOTE — PROGRESS NOTES
Phyllis [facility]  Internal Medicine Residency Program  Progress Note  - House Team 1    Patient:  Shania French 31 y.o. female MRN: [id number]     Date of Service: 11/4/2020     CC: fatigue and chest discomfort  Overnight events: Glucose labile overnight, changed to MDSS. Nausea and vomiting overnight, responded well to Zofran    Subjective       Patient seen and examined at bedside this AM, vitals stable. She is endorsing whole body pain slightly improved to 6/10. Still dealing with transient nausea with decreased appetite from overnight. She also still has dyspnea with walking to the bathroom, but not at rest. Denies subjective fevers, chills, cough, diarrhea, constipation, or issues with urination. Objective     Physical Exam:  · Vitals: BP (!) 165/114   Pulse 115   Temp 97.9 °F (36.6 °C) (Temporal)   Resp 16   Ht 5' 4\" (1.626 m)   Wt 140 lb 14 oz (63.9 kg)   LMP 07/01/2020   SpO2 97%   BMI 24.18 kg/m²     · I & O - 24hr: No intake/output data recorded. · General Appearance: alert, appears stated age and cooperative  · HEENT:  Head: Normocephalic, no lesions, without obvious abnormality. · Neck: no adenopathy, supple, symmetrical, trachea midline and thyroid not enlarged, symmetric, no tenderness/mass/nodules  · Lung: clear to auscultation bilaterally  · Heart: regular rate and rhythm  · Abdomen: soft, non-tender; bowel sounds normal; no masses,  no organomegaly  · Extremities:  extremities normal, atraumatic, no cyanosis or edema  · Musculokeletal: No joint swelling, no muscle tenderness. ROM normal in all joints of extremities.    · Neurologic: Mental status: Alert, oriented, thought content appropriate    Pertinent Labs & Imaging Studies     CBC with Differential:    Lab Results   Component Value Date    WBC 8.7 11/04/2020    RBC 3.43 11/04/2020    HGB 10.3 11/04/2020    HCT 32.7 11/04/2020     11/04/2020    MCV 95.3 11/04/2020    MCH 30.0 11/04/2020    MCHC 31.5 11/04/2020    RDW 16.0 11/04/2020    NRBC 0.9 08/10/2020    SEGSPCT 67 06/27/2013    METASPCT 1.7 08/10/2020    LYMPHOPCT 20.9 11/04/2020    MONOPCT 4.9 11/04/2020    BASOPCT 1.1 11/04/2020    MONOSABS 0.43 11/04/2020    LYMPHSABS 1.82 11/04/2020    EOSABS 0.56 11/04/2020    BASOSABS 0.10 11/04/2020     CMP:    Lab Results   Component Value Date     11/04/2020    K 4.0 11/04/2020    K 3.7 10/11/2020     11/04/2020    CO2 25 11/04/2020    BUN 40 11/04/2020    CREATININE 7.5 11/04/2020    GFRAA 8 11/04/2020    LABGLOM 8 11/04/2020    GLUCOSE 239 11/04/2020    GLUCOSE 130 05/18/2012    PROT 5.0 11/04/2020    LABALBU 2.8 11/04/2020    LABALBU 4.1 05/18/2012    CALCIUM 8.5 11/04/2020    BILITOT <0.2 11/04/2020    ALKPHOS 115 11/04/2020    AST 16 11/04/2020    ALT 33 11/04/2020     Magnesium:    Lab Results   Component Value Date    MG 2.4 11/04/2020     Phosphorus:    Lab Results   Component Value Date    PHOS 8.9 11/04/2020       Student's Assessment and Plan     [address] [facility] is a 31 y.o. female w/ PMHx of acute congestive heart failure (YULISSA 11/2/20 visually estimates EF 60-65%), ESRD on peritoneal hemodialysis, poorly controller type 1 diabetes mellitus, diabetic ketoacidosis, hypothyroidism, HTN, and HLD presented to the ED with CC of fatigue and chest pain. 1. Endocarditis              -YULISSA (10/19/20) demonstrated a large irregular, 2cm x 1cm, mobile, echogenic mass attached to the right atrial side of interatrial septum near SVC opening suggestive of vegetation or thrombus and a large, 1cm x 1cm, irregular echogenic mass attached to the tip of the central venous catheter suggestive of vegetation or thrombus. -Repeat YULISSA (11/2/20) demonstrated right atrial mobile mass approximately 2.4 cm x 0.4 cm, which may represent a vegetation or mobile thrombus. It attaches to the atrial septum superior to the fossa ovalis, at the   junction of the SVC and the RA.               -Given IV vancomycin 1g x1   -Follow up with PA pressure   -Follow PFT              -Follow cardiology recommendations   -Angiovac vegectomy tomorrow     2. Staph epidermidis bacteremia suspected 2/2 endocarditis s/p removal of mediport - Resolved              -Blood culture grew staph epidermidis (10/8/20)              -Mediport removed              -Repeat blood cultures (10/31) showed no growth 24hrs              -Given IV vancomycin 1g x1              -Follow infectious disease recommendations    3. Nausea   -Zofran 4mg IV q6hrs PRN nausea   -Tigan 200mg intramuscular q6hrs PRN nausea   -Benadryl 50mg IV x1   -Monitor     4. C diff infection (10/25)              -Negative C diff toxin (10/31)              -Patient denying diarrhea today (11/4)              -Continue vancomycin PO to complete 14 day course (10/25-11/8)     5. ESRD on peritoneal dialysis              -Nephrology following              -Continue peritoneal dialysis              -Continue bumex 2mg PO BID              -Continue metolazone 5mg PO 1x daily   -EPO 5000units 3x weekly     6. Type I DM              -Lantus 8 units subcutaneous nightly              -MDSS 3 times with meals, and nightly   -Continuous D5              -POCT q4hrs   -Encourage PO intake, dietary supplements   -Monitor     7. HTN              -Continue lisinopril 20mg PO 1x daily              -Continue diltiazem 240 mg PO 1x daily              -Continue Hydralazine 25mg PO q8hrs              -Monitor     8.  Hypothyroidism              -TSH 6.0. T3 1.3, T4 0.78 (10/31/20)              -Continue synthroid 75mcg PO 1x daily              -Monitor    PT/OT evaluation: N/A  DVT prophylaxis/ GI prophylaxis: Heparin/Pepcid   Disposition: inpatient monitor     Vivian Weeks  MS3  Attending physician: Dr. Amy Martínez  Department of Internal Medicine  Internal Medicine Residency / [address]    Attending Physician Statement    [address] [facility] was discussed, including pertinent history and examination findings with the multidisciplinary team during bedside rounds. I have seen and examined the patient and the key elements of the encounter have been performed by myself. I have read and reviewed the documentation. If needed or disputed the following findings, counseling and treatment options which have been corrected and communicated back to the patient, family if applicable and contributing physicians. I agree with the assessment, plan and orders as noted by the resident.       Jj Judd DO , Mili Snowden  Professor of Medicine  Pulmonary, [address] [facility] Medicine  Internal Medicine Academic Faculty

## 2020-11-04 NOTE — PROGRESS NOTES
Department of Internal Medicine  Infectious Diseases  Progress Note      C/C :   Endocarditis     Pt is awake and alert   Denies fever or chills  Reports nausea    Reports right arm pain     Afebrile     Current Facility-Administered Medications   Medication Dose Route Frequency Provider Last Rate Last Dose    insulin lispro (HUMALOG) injection vial 0-12 Units  0-12 Units Subcutaneous TID  Jun Peggy Seo DO   6 Units at 11/04/20 0538    insulin lispro (HUMALOG) injection vial 0-6 Units  0-6 Units Subcutaneous Nightly [facility],         insulin glargine (LANTUS) injection vial 8 Units  8 Units Subcutaneous Nightly Jacklyn Keys MD        albumin human 25 % IV solution 25 g  25 g Intravenous BID Georgette Rodriguez MD        trimethobenzamide Wyvonnia Massa) injection 200 mg  200 mg Intramuscular Q6H PRN Jacklyn Keys MD   200 mg at 11/04/20 1121    ceFAZolin (ANCEF) 2 g in sterile water 20 mL IV syringe  2 g Intravenous See Admin Instructions MADDIE Davis        lidocaine 4 % external patch 1 patch  1 patch Transdermal Daily Jacklyn Keys MD   1 patch at 11/04/20 1321    morphine (PF) injection 1 mg  1 mg Intravenous Q4H PRN Jacklyn Keys MD   1 mg at 11/04/20 1319    sevelamer (RENVELA) tablet 800 mg  800 mg Oral TID  Jacklyn Keys MD   800 mg at 11/04/20 1120    dextrose 5 % solution   Intravenous Continuous Jacklyn Keys MD 50 mL/hr at 11/04/20 0854      senna (SENOKOT) tablet 8.6 mg  1 tablet Oral Nightly Jacklyn Keys MD   8.6 mg at 11/03/20 2017    levothyroxine (SYNTHROID) tablet 75 mcg  75 mcg Oral QAM AC Nishant Loving MD   75 mcg at 11/04/20 0533    labetalol (NORMODYNE;TRANDATE) injection 5 mg  5 mg Intravenous Q6H PRN Daniel Rausch MD   5 mg at 11/03/20 0830    dilTIAZem (CARDIZEM CD) extended release capsule 240 mg  240 mg Oral Daily Jacklyn Keys MD   240 mg at 11/04/20 0830    bumetanide (BUMEX) tablet 2 mg  2 mg Oral BID Tessa Qiu MD   2 mg at 11/04/20 0830    metOLazone (ZAROXOLYN) tablet 5 mg  5 mg Oral Daily Tesas Qiu MD   5 mg at 11/04/20 0830    oxyCODONE-acetaminophen (PERCOCET) 5-325 MG per tablet 0.5 tablet  0.5 tablet Oral Q6H PRN Tessa Qiu MD        Or    oxyCODONE-acetaminophen (PERCOCET) 5-325 MG per tablet 1 tablet  1 tablet Oral Q6H PRN Tessa Qiu MD   1 tablet at 11/04/20 1137    hydrALAZINE (APRESOLINE) tablet 25 mg  25 mg Oral 3 times per day Priscilla Key MD   25 mg at 11/04/20 1355    lidocaine 1 % injection 5 mL  5 mL Intradermal Once Delfino Ruiz MD        sodium chloride flush 0.9 % injection 10 mL  10 mL Intravenous PRN Ishan Dee MD   10 mL at 11/03/20 2015    heparin flush 100 UNIT/ML injection 100 Units  1 mL Intravenous 2 times per day Delfino Ruiz MD   100 Units at 11/04/20 0829    heparin flush 100 UNIT/ML injection 100 Units  1 mL Intracatheter PRN Nilam Dee MD   100 Units at 11/04/20 1118    vancomycin (VANCOCIN) oral solution 250 mg  250 mg Oral 4 times per day Delfino Ruiz MD   250 mg at 11/04/20 1121    calcium gluconate 10 % injection 1 g  1 g Intravenous Once [place] MD Patricia        sodium chloride flush 0.9 % injection 10 mL  10 mL Intravenous 2 times per day Keyana Newton MD   10 mL at 11/04/20 0829    sodium chloride flush 0.9 % injection 10 mL  10 mL Intravenous PRN Keyana Newton MD   10 mL at 11/04/20 1118    acetaminophen (TYLENOL) tablet 650 mg  650 mg Oral Q6H PRN Keyana Newton MD   650 mg at 11/03/20 7752    Or    acetaminophen (TYLENOL) suppository 650 mg  650 mg Rectal Q6H PRN Keyana Newton MD        polyethylene glycol (GLYCOLAX) packet 17 g  17 g Oral Daily PRN Keyana Newton MD        promethazine (PHENERGAN) tablet 12.5 mg  12.5 mg Oral Q6H PRN Keyana Newton MD        Or    ondansetron (ZOFRAN) injection 4 mg  4 mg Intravenous Q6H PRN Keyana Newton MD   4 mg at 11/04/20 0970    heparin (porcine) injection 5,000 Units  5,000 Units Subcutaneous 3 times per day Missy Burgos MD   5,000 Units at 11/04/20 0539    glucose (GLUTOSE) 40 % oral gel 15 g  15 g Oral PRN Missy Burgos MD   15 g at 11/04/20 0836    dextrose 50 % IV solution  12.5 g Intravenous PRN Missy Burgos MD   12.5 g at 11/03/20 0608    glucagon (rDNA) injection 1 mg  1 mg Intramuscular PRN Missy Burgos MD        dextrose 5 % solution  100 mL/hr Intravenous PRN Missy Burgos MD   Stopped at 11/04/20 6958    gentamicin (GARAMYCIN) 0.1 % cream   Topical Daily Joe Coulter MD        epoetin nena-epbx (RETACRIT) injection 5,000 Units  5,000 Units Subcutaneous Once per day on Mon Wed Fri Joe Coulter MD   5,000 Units at 11/04/20 1007    famotidine (PEPCID) tablet 20 mg  20 mg Oral Daily Astrid Hawkins MD   20 mg at 11/03/20 0818    LORazepam (ATIVAN) tablet 0.5 mg  0.5 mg Oral BID PRN Astrid Hawkins MD   0.5 mg at 11/04/20 1224       REVIEW OF SYSTEMS:    CONSTITUTIONAL:  Denies fever, chill or rigors  HEENT: denies blurring of vision or double vision, denies hearing problem  RESPIRATORY: Denies SOB   CARDIOVASCULAR:  Reports chest pain   GASTROINTESTINAL: Nausea   GENITOURINARY:  Denies burning urination or frequency of urination  INTEGUMENT: denies wound , rash  HEMATOLOGIC/LYMPHATIC:  Denies lymph node swelling, gum bleeding or easy bruising. MUSCULOSKELETAL:  Denies leg pain , joint pain , joint swelling  NEUROLOGICAL:  Weakness            PHYSICAL EXAM:      Vitals:     BP (!) 165/114   Pulse 115   Temp 97.9 °F (36.6 °C) (Temporal)   Resp 16   Ht 5' 4\" (1.626 m)   Wt 140 lb 14 oz (63.9 kg)   LMP 07/01/2020   SpO2 97%   BMI 24.18 kg/m²       General Appearance:    Awake and alert, no distress    Head:    Normocephalic, atraumatic   Eyes:    No pallor, no icterus,   Ears:    No obvious deformity or drainage.    Nose:   No nasal drainage   Throat:   Mucosa moist, no oral thrush Neck:   Supple, no lymphadenopathy   Lungs:     Clear to auscultation bilaterally,    Heart:    Tachycardic, systolic murmur +   Abdomen:     Soft, non-tender, bowel sounds present, PD catheter in place    Extremities:   No edema, no cyanosis   Pulses:   Dorsalis pedis palpable    Skin:   no rashes or lesions       DATA:      CBC with Differential:      Lab Results   Component Value Date    WBC 8.7 11/04/2020    RBC 3.43 11/04/2020    HGB 10.3 11/04/2020    HCT 32.7 11/04/2020     11/04/2020    MCV 95.3 11/04/2020    MCH 30.0 11/04/2020    MCHC 31.5 11/04/2020    RDW 16.0 11/04/2020    NRBC 0.9 08/10/2020    SEGSPCT 67 06/27/2013    METASPCT 1.7 08/10/2020    LYMPHOPCT 20.9 11/04/2020    MONOPCT 4.9 11/04/2020    BASOPCT 1.1 11/04/2020    MONOSABS 0.43 11/04/2020    LYMPHSABS 1.82 11/04/2020    EOSABS 0.56 11/04/2020    BASOSABS 0.10 11/04/2020       CMP     Lab Results   Component Value Date     11/04/2020    K 4.0 11/04/2020    K 3.7 10/11/2020     11/04/2020    CO2 25 11/04/2020    BUN 40 11/04/2020    CREATININE 7.5 11/04/2020    GFRAA 8 11/04/2020    LABGLOM 8 11/04/2020    GLUCOSE 239 11/04/2020    GLUCOSE 130 05/18/2012    PROT 5.0 11/04/2020    LABALBU 2.8 11/04/2020    LABALBU 4.1 05/18/2012    CALCIUM 8.5 11/04/2020    BILITOT <0.2 11/04/2020    ALKPHOS 115 11/04/2020    AST 16 11/04/2020    ALT 33 11/04/2020         Hepatic Function Panel:    Lab Results   Component Value Date    ALKPHOS 115 11/04/2020    ALT 33 11/04/2020    AST 16 11/04/2020    PROT 5.0 11/04/2020    BILITOT <0.2 11/04/2020    BILIDIR <0.2 10/11/2020    IBILI see below 10/11/2020    LABALBU 2.8 11/04/2020    LABALBU 4.1 05/18/2012       PT/INR:    Lab Results   Component Value Date    PROTIME 10.6 10/31/2020    PROTIME 13.6 08/14/2011    INR 1.0 10/31/2020       TSH:    Lab Results   Component Value Date    TSH 6.000 10/31/2020       U/A:    Lab Results   Component Value Date    COLORU Yellow 11/01/2020    PHUR 8.0 11/01/2020    LABCAST RARE 01/12/2020    WBCUA NONE 11/01/2020    WBCUA 0-1 05/18/2012    RBCUA 2-5 11/01/2020    RBCUA 1-3 08/01/2013    MUCUS Present 02/12/2016    TRICHOMONAS Present 07/17/2020    YEAST RARE 05/24/2018    BACTERIA FEW 11/01/2020    CLARITYU Clear 11/01/2020    SPECGRAV 1.020 11/01/2020    LEUKOCYTESUR Negative 11/01/2020    UROBILINOGEN 0.2 11/01/2020    BILIRUBINUR Negative 11/01/2020    BILIRUBINUR SMALL 05/18/2012    BLOODU SMALL 11/01/2020    GLUCOSEU 250 11/01/2020    GLUCOSEU >=1000 05/18/2012    AMORPHOUS RARE 11/01/2019       ABG:    Lab Results   Component Value Date    ONH8LZF 22.1 10/29/2019    Z4CGKORK 97.7 09/08/2012    PHART 7.345 07/20/2012    RWJ7DJD 35.0 10/29/2019    PO2ART 91.5 10/29/2019       MICROBIOLOGY:    Blood culture - Staph epidermidis  ( 10/8)     Vanco random level  (11/4) 28.8      YULISSA -     Summary    Ejection fraction is visually estimated at 60-65%.    There is a right atrial mobile mass approximately 2.4 cm x 0.4 cm, which    may represent a vegetation or mobile thrombus.    It attaches to the atrial septum superior to the fossa ovalis, at the    junction of the SVC and the RA.    Previously seen central venous catheter and associated mass are no longer    seen on this study.    Mild mitral regurgitation.    Mild tricuspid regurgitation.    No evidence of valvular vegetations       IMPRESSION:     1. 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 C diff infection ( at [facility] on 10/25) - diarrhea stopped     RECOMMENDATIONS:      1. Vancomycin random level   2. Oral vancomycin 250 mg po q 6 hrs   3. OR for clot removal

## 2020-11-05 ENCOUNTER — ANESTHESIA (OUTPATIENT)
Dept: OPERATING ROOM | Age: 30
DRG: 721 | End: 2020-11-05
Payer: COMMERCIAL

## 2020-11-05 LAB
ALBUMIN SERPL-MCNC: 3.5 G/DL (ref 3.5–5.2)
ALP BLD-CCNC: 102 U/L (ref 35–104)
ALT SERPL-CCNC: 19 U/L (ref 0–32)
ANION GAP SERPL CALCULATED.3IONS-SCNC: 19 MMOL/L (ref 7–16)
AST SERPL-CCNC: 11 U/L (ref 0–31)
BASOPHILS ABSOLUTE: 0.09 E9/L (ref 0–0.2)
BASOPHILS RELATIVE PERCENT: 2 % (ref 0–2)
BILIRUB SERPL-MCNC: 0.2 MG/DL (ref 0–1.2)
BLOOD CULTURE, ROUTINE: NORMAL
BUN BLDV-MCNC: 35 MG/DL (ref 6–20)
CALCIUM SERPL-MCNC: 9 MG/DL (ref 8.6–10.2)
CHLORIDE BLD-SCNC: 96 MMOL/L (ref 98–107)
CO2: 23 MMOL/L (ref 22–29)
CREAT SERPL-MCNC: 7.7 MG/DL (ref 0.5–1)
CULTURE, BLOOD 2: NORMAL
EOSINOPHILS ABSOLUTE: 0.58 E9/L (ref 0.05–0.5)
EOSINOPHILS RELATIVE PERCENT: 12.6 % (ref 0–6)
GFR AFRICAN AMERICAN: 8
GFR NON-AFRICAN AMERICAN: 8 ML/MIN/1.73
GLUCOSE BLD-MCNC: 312 MG/DL (ref 74–99)
HCT VFR BLD CALC: 29.3 % (ref 34–48)
HEMOGLOBIN: 8.9 G/DL (ref 11.5–15.5)
IMMATURE GRANULOCYTES #: 0.01 E9/L
IMMATURE GRANULOCYTES %: 0.2 % (ref 0–5)
LYMPHOCYTES ABSOLUTE: 1.75 E9/L (ref 1.5–4)
LYMPHOCYTES RELATIVE PERCENT: 38.1 % (ref 20–42)
MAGNESIUM: 2.3 MG/DL (ref 1.6–2.6)
MCH RBC QN AUTO: 29.7 PG (ref 26–35)
MCHC RBC AUTO-ENTMCNC: 30.4 % (ref 32–34.5)
MCV RBC AUTO: 97.7 FL (ref 80–99.9)
METER GLUCOSE: 208 MG/DL (ref 74–99)
METER GLUCOSE: 256 MG/DL (ref 74–99)
METER GLUCOSE: 269 MG/DL (ref 74–99)
METER GLUCOSE: 301 MG/DL (ref 74–99)
METER GLUCOSE: 312 MG/DL (ref 74–99)
METER GLUCOSE: 337 MG/DL (ref 74–99)
METER GLUCOSE: 347 MG/DL (ref 74–99)
METER GLUCOSE: 355 MG/DL (ref 74–99)
MONOCYTES ABSOLUTE: 0.37 E9/L (ref 0.1–0.95)
MONOCYTES RELATIVE PERCENT: 8.1 % (ref 2–12)
NEUTROPHILS ABSOLUTE: 1.79 E9/L (ref 1.8–7.3)
NEUTROPHILS RELATIVE PERCENT: 39 % (ref 43–80)
PDW BLD-RTO: 16.2 FL (ref 11.5–15)
PHOSPHORUS: 8.9 MG/DL (ref 2.5–4.5)
PLATELET # BLD: 257 E9/L (ref 130–450)
PMV BLD AUTO: 10.5 FL (ref 7–12)
POTASSIUM SERPL-SCNC: 3.5 MMOL/L (ref 3.5–5)
RBC # BLD: 3 E12/L (ref 3.5–5.5)
SODIUM BLD-SCNC: 138 MMOL/L (ref 132–146)
TOTAL PROTEIN: 5.4 G/DL (ref 6.4–8.3)
WBC # BLD: 4.6 E9/L (ref 4.5–11.5)

## 2020-11-05 PROCEDURE — 94726 PLETHYSMOGRAPHY LUNG VOLUMES: CPT

## 2020-11-05 PROCEDURE — 94060 EVALUATION OF WHEEZING: CPT

## 2020-11-05 PROCEDURE — 2500000003 HC RX 250 WO HCPCS: Performed by: INTERNAL MEDICINE

## 2020-11-05 PROCEDURE — 6360000002 HC RX W HCPCS: Performed by: INTERNAL MEDICINE

## 2020-11-05 PROCEDURE — 82962 GLUCOSE BLOOD TEST: CPT

## 2020-11-05 PROCEDURE — 2140000000 HC CCU INTERMEDIATE R&B

## 2020-11-05 PROCEDURE — 6370000000 HC RX 637 (ALT 250 FOR IP): Performed by: INTERNAL MEDICINE

## 2020-11-05 PROCEDURE — 84100 ASSAY OF PHOSPHORUS: CPT

## 2020-11-05 PROCEDURE — 94726 PLETHYSMOGRAPHY LUNG VOLUMES: CPT | Performed by: INTERNAL MEDICINE

## 2020-11-05 PROCEDURE — 85025 COMPLETE CBC W/AUTO DIFF WBC: CPT

## 2020-11-05 PROCEDURE — 90945 DIALYSIS ONE EVALUATION: CPT

## 2020-11-05 PROCEDURE — 80053 COMPREHEN METABOLIC PANEL: CPT

## 2020-11-05 PROCEDURE — P9047 ALBUMIN (HUMAN), 25%, 50ML: HCPCS | Performed by: INTERNAL MEDICINE

## 2020-11-05 PROCEDURE — 94729 DIFFUSING CAPACITY: CPT

## 2020-11-05 PROCEDURE — 2580000003 HC RX 258: Performed by: INTERNAL MEDICINE

## 2020-11-05 PROCEDURE — 94060 EVALUATION OF WHEEZING: CPT | Performed by: INTERNAL MEDICINE

## 2020-11-05 PROCEDURE — 36592 COLLECT BLOOD FROM PICC: CPT

## 2020-11-05 PROCEDURE — 83735 ASSAY OF MAGNESIUM: CPT

## 2020-11-05 PROCEDURE — 99233 SBSQ HOSP IP/OBS HIGH 50: CPT | Performed by: INTERNAL MEDICINE

## 2020-11-05 PROCEDURE — 94729 DIFFUSING CAPACITY: CPT | Performed by: INTERNAL MEDICINE

## 2020-11-05 RX ORDER — DIPHENHYDRAMINE HYDROCHLORIDE 50 MG/ML
25 INJECTION INTRAMUSCULAR; INTRAVENOUS ONCE
Status: COMPLETED | OUTPATIENT
Start: 2020-11-05 | End: 2020-11-05

## 2020-11-05 RX ADMIN — OXYCODONE AND ACETAMINOPHEN 1 TABLET: 5; 325 TABLET ORAL at 20:00

## 2020-11-05 RX ADMIN — SODIUM CHLORIDE, PRESERVATIVE FREE 100 UNITS: 5 INJECTION INTRAVENOUS at 10:39

## 2020-11-05 RX ADMIN — DIPHENHYDRAMINE HYDROCHLORIDE 25 MG: 50 INJECTION, SOLUTION INTRAMUSCULAR; INTRAVENOUS at 07:57

## 2020-11-05 RX ADMIN — STANDARDIZED SENNA CONCENTRATE 8.6 MG: 8.6 TABLET ORAL at 21:02

## 2020-11-05 RX ADMIN — HEPARIN SODIUM 5000 UNITS: 10000 INJECTION INTRAVENOUS; SUBCUTANEOUS at 21:53

## 2020-11-05 RX ADMIN — LABETALOL HYDROCHLORIDE 10 MG: 5 INJECTION INTRAVENOUS at 11:15

## 2020-11-05 RX ADMIN — LABETALOL HYDROCHLORIDE 10 MG: 5 INJECTION INTRAVENOUS at 23:47

## 2020-11-05 RX ADMIN — Medication 10 ML: at 07:57

## 2020-11-05 RX ADMIN — LABETALOL HYDROCHLORIDE 10 MG: 5 INJECTION INTRAVENOUS at 06:26

## 2020-11-05 RX ADMIN — METOLAZONE 5 MG: 5 TABLET ORAL at 10:39

## 2020-11-05 RX ADMIN — DILTIAZEM HYDROCHLORIDE 240 MG: 240 CAPSULE, EXTENDED RELEASE ORAL at 10:10

## 2020-11-05 RX ADMIN — LORAZEPAM 0.5 MG: 0.5 TABLET ORAL at 00:29

## 2020-11-05 RX ADMIN — SODIUM CHLORIDE, PRESERVATIVE FREE 10 ML: 5 INJECTION INTRAVENOUS at 00:29

## 2020-11-05 RX ADMIN — HEPARIN 100 UNITS: 100 SYRINGE at 00:29

## 2020-11-05 RX ADMIN — HYDRALAZINE HYDROCHLORIDE 25 MG: 25 TABLET, FILM COATED ORAL at 21:53

## 2020-11-05 RX ADMIN — BUMETANIDE 2 MG: 1 TABLET ORAL at 21:02

## 2020-11-05 RX ADMIN — HYDRALAZINE HYDROCHLORIDE 25 MG: 25 TABLET, FILM COATED ORAL at 15:20

## 2020-11-05 RX ADMIN — OXYCODONE AND ACETAMINOPHEN 1 TABLET: 5; 325 TABLET ORAL at 06:09

## 2020-11-05 RX ADMIN — LEVOTHYROXINE SODIUM 75 MCG: 0.07 TABLET ORAL at 05:28

## 2020-11-05 RX ADMIN — Medication 10 ML: at 21:03

## 2020-11-05 RX ADMIN — HYDRALAZINE HYDROCHLORIDE 25 MG: 25 TABLET, FILM COATED ORAL at 04:34

## 2020-11-05 RX ADMIN — ONDANSETRON 4 MG: 2 INJECTION INTRAMUSCULAR; INTRAVENOUS at 17:03

## 2020-11-05 RX ADMIN — OXYCODONE AND ACETAMINOPHEN 1 TABLET: 5; 325 TABLET ORAL at 00:29

## 2020-11-05 RX ADMIN — ALBUMIN (HUMAN) 25 G: 0.25 INJECTION, SOLUTION INTRAVENOUS at 10:10

## 2020-11-05 RX ADMIN — ALBUMIN (HUMAN) 25 G: 0.25 INJECTION, SOLUTION INTRAVENOUS at 21:02

## 2020-11-05 ASSESSMENT — PAIN SCALES - GENERAL
PAINLEVEL_OUTOF10: 10
PAINLEVEL_OUTOF10: 0
PAINLEVEL_OUTOF10: 7
PAINLEVEL_OUTOF10: 0
PAINLEVEL_OUTOF10: 10
PAINLEVEL_OUTOF10: 7
PAINLEVEL_OUTOF10: 0

## 2020-11-05 ASSESSMENT — PAIN DESCRIPTION - PAIN TYPE: TYPE: ACUTE PAIN

## 2020-11-05 ASSESSMENT — PAIN DESCRIPTION - ORIENTATION: ORIENTATION: MID

## 2020-11-05 ASSESSMENT — PAIN DESCRIPTION - FREQUENCY: FREQUENCY: INTERMITTENT

## 2020-11-05 ASSESSMENT — PAIN DESCRIPTION - DESCRIPTORS: DESCRIPTORS: DISCOMFORT;HEADACHE;SORE

## 2020-11-05 ASSESSMENT — PAIN DESCRIPTION - PROGRESSION: CLINICAL_PROGRESSION: GRADUALLY IMPROVING

## 2020-11-05 ASSESSMENT — PAIN DESCRIPTION - ONSET: ONSET: GRADUAL

## 2020-11-05 ASSESSMENT — PAIN DESCRIPTION - LOCATION: LOCATION: HEAD

## 2020-11-05 ASSESSMENT — PAIN SCALES - WONG BAKER: WONGBAKER_NUMERICALRESPONSE: 0

## 2020-11-05 ASSESSMENT — PAIN - FUNCTIONAL ASSESSMENT: PAIN_FUNCTIONAL_ASSESSMENT: ACTIVITIES ARE NOT PREVENTED

## 2020-11-05 NOTE — FLOWSHEET NOTE
CCPD initiated as ordered. Dressing  Changed & Abx applied; First drain/fill observed without complications, Clear Straw effluent noted. Please contact the dialysis nurse on call with any issues. 11/04/20 1846   Vitals   BP (!) 182/98   Temp 98.7 °F (37.1 °C)   Temp Source Temporal   Pulse 107   Resp 18   Weight 143 lb 4.8 oz (65 kg)   Peritoneal Dialysis Catheter Left lower abdomen   Placement Date/Time: 10/31/20 0704   Catheter Location: Left lower abdomen   Status Accessed   Site Condition No Complications   Dressing Status Clean;Dry; Intact   Dressing Gauze   Cycler   Verification of Prescription CCPD   Total Volume Programmed 15981 mL   Therapy Time (Hours:Minutes) 9   Fill Volume 2000 mL   Last Fill Volume 2000 mL   Dextrose Setting Different (Extraneal)   Number of Cycles 5   Bag Volume 5000 mL   Number of Bags Used 3   Dianeal Solution Other (Comment)  (2.5 AND 1.5% DEXTROSE IN 5000ML)

## 2020-11-05 NOTE — PROGRESS NOTES
[address] [place] [address] Dr. Goff Lav re:  following protocol per orders of low BS and notified of low BS.

## 2020-11-05 NOTE — PROGRESS NOTES
Physical Therapy    [facility]    NAME: Sharri Peguero  : 1990  MRN: [id number]     Order received and chart reviewed. Pt found sitting on the EOB independently. Pt reported she is independent with all functional mobility while in the hospital.  This therapist witnessed pt ambulating in the hallway independently. Will discontinue PT order at this time.       Raquel Agarwal, [address]

## 2020-11-05 NOTE — PROGRESS NOTES
Department of Internal Medicine  Infectious Diseases  Progress Note      C/C :   Endocarditis     Pt is awake and alert   Denies fever or chills  Reports nausea controlled   Afebrile     Current Facility-Administered Medications   Medication Dose Route Frequency Provider Last Rate Last Dose    insulin lispro (HUMALOG) injection vial 0-12 Units  0-12 Units Subcutaneous Q4H Dale Machuca MD        [START ON 11/6/2020] ceFAZolin (ANCEF) 2 g in sterile water 20 mL IV syringe  2 g Intravenous See Admin Instructions HEBER Padilla - CNP        insulin glargine (LANTUS) injection vial 8 Units  8 Units Subcutaneous Nightly Dale Machuca MD   8 Units at 11/04/20 2000    albumin human 25 % IV solution 25 g  25 g Intravenous BID Sukumar Suarez MD   Stopped at 11/05/20 1115    lidocaine 4 % external patch 1 patch  1 patch Transdermal Daily Dale Machuca MD   1 patch at 11/04/20 1321    hydrALAZINE (APRESOLINE) injection 10 mg  10 mg Intravenous Q4H PRN Heidy Latham MD        labetalol (NORMODYNE;TRANDATE) injection 10 mg  10 mg Intravenous Q4H PRN Heidy Latham MD   10 mg at 11/05/20 1115    sevelamer (RENVELA) tablet 800 mg  800 mg Oral TID WC Dale Machuca MD   Stopped at 11/05/20 0813    dextrose 5 % solution   Intravenous Continuous Dlae Machuca MD 50 mL/hr at 11/04/20 2327      senna (SENOKOT) tablet 8.6 mg  1 tablet Oral Nightly Dale Machuca MD   8.6 mg at 11/04/20 2000    levothyroxine (SYNTHROID) tablet 75 mcg  75 mcg Oral QAM AC Deri Schirmer, MD   75 mcg at 11/05/20 9332    dilTIAZem (CARDIZEM CD) extended release capsule 240 mg  240 mg Oral Daily Dale Machuca MD   240 mg at 11/05/20 1010    bumetanide (BUMEX) tablet 2 mg  2 mg Oral BID Dale Machuca MD   Stopped at 11/05/20 0900    metOLazone (ZAROXOLYN) tablet 5 mg  5 mg Oral Daily Dale Machuca MD   5 mg at 11/05/20 1039    oxyCODONE-acetaminophen (PERCOCET) 5-325 MG per tablet 0.5 tablet  0.5 tablet Oral Q6H PRN Luke Nieves MD        Or    oxyCODONE-acetaminophen (PERCOCET) 5-325 MG per tablet 1 tablet  1 tablet Oral Q6H PRN Luke Nieves MD   1 tablet at 11/05/20 0609    hydrALAZINE (APRESOLINE) tablet 25 mg  25 mg Oral 3 times per day Julito Hope MD   25 mg at 11/05/20 0434    lidocaine 1 % injection 5 mL  5 mL Intradermal Once Ishan Dee MD        sodium chloride flush 0.9 % injection 10 mL  10 mL Intravenous PRN Ishan Dee MD   10 mL at 11/03/20 2015    heparin flush 100 UNIT/ML injection 100 Units  1 mL Intravenous 2 times per day Jobie Spatz, MD   100 Units at 11/05/20 1039    heparin flush 100 UNIT/ML injection 100 Units  1 mL Intracatheter PRN Kaushik Dee MD   100 Units at 11/05/20 0029    vancomycin (VANCOCIN) oral solution 250 mg  250 mg Oral 4 times per day Jobie Spatz, MD   250 mg at 11/05/20 1231    calcium gluconate 10 % injection 1 g  1 g Intravenous Once Nuha Collins MD        sodium chloride flush 0.9 % injection 10 mL  10 mL Intravenous 2 times per day Meliton Navarro MD   10 mL at 11/05/20 0757    sodium chloride flush 0.9 % injection 10 mL  10 mL Intravenous PRN Meliton Navarro MD   10 mL at 11/05/20 0029    acetaminophen (TYLENOL) tablet 650 mg  650 mg Oral Q6H PRN Meliton Navarro MD   650 mg at 11/04/20 2331    Or    acetaminophen (TYLENOL) suppository 650 mg  650 mg Rectal Q6H PRN Meliton Navarro MD        polyethylene glycol (GLYCOLAX) packet 17 g  17 g Oral Daily PRN Meliton Navarro MD        promethazine (PHENERGAN) tablet 12.5 mg  12.5 mg Oral Q6H PRN Meliton Navarro MD        Or    ondansetron (ZOFRAN) injection 4 mg  4 mg Intravenous Q6H PRN Meliton Navarro MD   4 mg at 11/04/20 0927    heparin (porcine) injection 5,000 Units  5,000 Units Subcutaneous 3 times per day Meliton Navarro MD   5,000 Units at 11/04/20 2000    glucose (GLUTOSE) 40 % oral gel 15 g  15 g Oral PRN Meliton Navarro MD   15 g at 11/04/20 4391    dextrose 50 % IV solution  12.5 g Intravenous PRN Gerardine Klinefelter, MD   12.5 g at 11/03/20 0608    glucagon (rDNA) injection 1 mg  1 mg Intramuscular PRN Gerardine Klinefelter, MD        dextrose 5 % solution  100 mL/hr Intravenous PRN Gerardine Klinefelter, MD   Stopped at 11/04/20 2957    gentamicin (GARAMYCIN) 0.1 % cream   Topical Daily Joe Worley MD        epoetin nena-epbx (RETACRIT) injection 5,000 Units  5,000 Units Subcutaneous Once per day on Mon Wed Fri Joe Garcia MD   5,000 Units at 11/04/20 1007    famotidine (PEPCID) tablet 20 mg  20 mg Oral Daily Venu Melton MD   Stopped at 11/05/20 4132    LORazepam (ATIVAN) tablet 0.5 mg  0.5 mg Oral BID PRN Venu Melton MD   0.5 mg at 11/05/20 3425       REVIEW OF SYSTEMS:    CONSTITUTIONAL:  Denies fever, chill or rigors  HEENT: denies blurring of vision or double vision, denies hearing problem  RESPIRATORY: Denies SOB   CARDIOVASCULAR:  Reports chest pain   GASTROINTESTINAL: Nausea - improved   GENITOURINARY:  Denies burning urination or frequency of urination  INTEGUMENT: denies wound , rash  HEMATOLOGIC/LYMPHATIC:  No bleeding or bruise . MUSCULOSKELETAL:  Denies leg pain , joint pain , joint swelling  NEUROLOGICAL:  Weakness            PHYSICAL EXAM:      Vitals:     BP (!) 168/94   Pulse 103   Temp 98.9 °F (37.2 °C) (Temporal)   Resp 18   Ht 5' 4\" (1.626 m)   Wt 136 lb 3.2 oz (61.8 kg)   LMP 07/01/2020   SpO2 98%   BMI 23.38 kg/m²     General Appearance:    Awake and alert, no distress    Head:    Normocephalic, atraumatic   Eyes:    No pallor, no icterus,   Ears:    No obvious deformity or drainage.    Nose:   No nasal drainage   Throat:   Mucosa moist, no oral thrush   Neck:   Supple, no lymphadenopathy   Lungs:     Clear to auscultation bilaterally,    Heart:    Tachycardic, systolic murmur +   Abdomen:     Soft, non-tender, bowel sounds present, PD catheter in place    Extremities:   No edema, no cyanosis   Pulses: Dorsalis pedis palpable    Skin:   no rashes or lesions     CBC with Differential:      Lab Results   Component Value Date    WBC 4.6 11/05/2020    RBC 3.00 11/05/2020    HGB 8.9 11/05/2020    HCT 29.3 11/05/2020     11/05/2020    MCV 97.7 11/05/2020    MCH 29.7 11/05/2020    MCHC 30.4 11/05/2020    RDW 16.2 11/05/2020    NRBC 0.9 08/10/2020    SEGSPCT 67 06/27/2013    METASPCT 1.7 08/10/2020    LYMPHOPCT 38.1 11/05/2020    MONOPCT 8.1 11/05/2020    BASOPCT 2.0 11/05/2020    MONOSABS 0.37 11/05/2020    LYMPHSABS 1.75 11/05/2020    EOSABS 0.58 11/05/2020    BASOSABS 0.09 11/05/2020       CMP     Lab Results   Component Value Date     11/05/2020    K 3.5 11/05/2020    K 3.7 10/11/2020    CL 96 11/05/2020    CO2 23 11/05/2020    BUN 35 11/05/2020    CREATININE 7.7 11/05/2020    GFRAA 8 11/05/2020    LABGLOM 8 11/05/2020    GLUCOSE 312 11/05/2020    GLUCOSE 130 05/18/2012    PROT 5.4 11/05/2020    LABALBU 3.5 11/05/2020    LABALBU 4.1 05/18/2012    CALCIUM 9.0 11/05/2020    BILITOT 0.2 11/05/2020    ALKPHOS 102 11/05/2020    AST 11 11/05/2020    ALT 19 11/05/2020         Hepatic Function Panel:    Lab Results   Component Value Date    ALKPHOS 102 11/05/2020    ALT 19 11/05/2020    AST 11 11/05/2020    PROT 5.4 11/05/2020    BILITOT 0.2 11/05/2020    BILIDIR <0.2 10/11/2020    IBILI see below 10/11/2020    LABALBU 3.5 11/05/2020    LABALBU 4.1 05/18/2012       PT/INR:    Lab Results   Component Value Date    PROTIME 10.6 10/31/2020    PROTIME 13.6 08/14/2011    INR 1.0 10/31/2020       TSH:    Lab Results   Component Value Date    TSH 6.000 10/31/2020       U/A:    Lab Results   Component Value Date    COLORU Yellow 11/01/2020    PHUR 8.0 11/01/2020    LABCAST RARE 01/12/2020    WBCUA NONE 11/01/2020    WBCUA 0-1 05/18/2012    RBCUA 2-5 11/01/2020    RBCUA 1-3 08/01/2013    MUCUS Present 02/12/2016    TRICHOMONAS Present 07/17/2020    YEAST RARE 05/24/2018    BACTERIA FEW 11/01/2020    CLARITYU Clear

## 2020-11-05 NOTE — PROGRESS NOTES
CC: endocarditis    Brief HPI:  Patient seen with Dr. Venancio Kovacs. Awake, alert. tearful.       Past Medical History:   Diagnosis Date    [facility]     BC (acute kidney injury) ([facility] 10/1/2019    Cephalgia 10/9/2019    Chronic kidney disease     Depression     Diabetes mellitus ([facility]     Diabetic ketoacidosis ([facility] 2011    Hemodialysis patient ([facility]     History of blood transfusion 2019    Hyperlipidemia 10/8/2020    Hypothyroidism 10/8/2020    Iron deficiency anemia 10/1/2019    MDRO (multiple drug resistant organisms) resistance     MRSA (methicillin resistant Staphylococcus aureus)     back wound abcess    Non compliance w medication regimen 3/30/2016    Other disorders of kidney and ureter     Pregnancy 3/30/2016    16 weeks    Severe pre-eclampsia in third trimester 2016     Past Surgical History:   Procedure Laterality Date    BACK SURGERY      abscess     SECTION      x2    CHOLECYSTECTOMY, LAPAROSCOPIC N/A 2019    CHOLECYSTECTOMY LAPAROSCOPIC performed by Nathalie Tinajero MD at [facility] N/A 2018    COLONOSCOPY WITH BIOPSY performed by Fadia Walls MD at [address] [facility] N/A 2018    COLONOSCOPY WITH BIOPSY performed by Nikia Brown MD at [address] [facility] ECHO COMPL [address]  2012    EF 57%    ECHO COMPL W DOP COLOR FLOW  6/10/2013         LAPAROSCOPY INSERTION PERITONEAL CATHETER N/A 2020    LAPAROSCOPIC INSERTION PERITONEAL DIALYSIS CATHETER performed by Fidel León MD at [facility] [address] ESOPHAGOGASTRODUODENOSCOPY TRANSORAL DIAGNOSTIC N/A 2018    EGD ESOPHAGOGASTRODUODENOSCOPY performed by Fadia Walls MD at [address] [facility] TRANSESOPHAGEAL ECHOCARDIOGRAM N/A 10/19/2020    TRANSESOPHAGEAL ECHOCARDIOGRAM WITH BUBBLE STUDY performed by Olman Mendoza MD at [address] [facility] [address] [facility]  2018    UPPER GASTROINTESTINAL ENDOSCOPY 12/18/2018    EGD BIOPSY performed by Irwin Ramachandran MD at [address] N/A 10/11/2019    EGD ESOPHAGOGASTRODUODENOSCOPY performed by Terri Loving DO at [address] History     Socioeconomic History    Marital status: Single     Spouse name: Not on file    Number of children: Not on file    Years of education: Not on file    Highest education level: Not on file   Occupational History    Not on file   Social Needs    Financial resource strain: Very hard    Food insecurity     Worry: Never true     Inability: Never true    Transportation needs     Medical: No     Non-medical: No   Tobacco Use    Smoking status: Current Every Day Smoker     Packs/day: 0.50     Years: 6.00     Pack years: 3.00     Types: Cigarettes    Smokeless tobacco: Current User   Substance and Sexual Activity    Alcohol use: No    Drug use: No     Comment: documented prior history of opioid abuse    Sexual activity: Yes     Partners: Male   Lifestyle    Physical activity     Days per week: Not on file     Minutes per session: Not on file    Stress: Not on file   Relationships    Social connections     Talks on phone: Not on file     Gets together: Not on file     Attends Taoism service: Not on file     Active member of club or organization: Not on file     Attends meetings of clubs or organizations: Not on file     Relationship status: Not on file    Intimate partner violence     Fear of current or ex partner: Not on file     Emotionally abused: Not on file     Physically abused: Not on file     Forced sexual activity: Not on file   Other Topics Concern    Not on file   Social History Narrative    Not on file     Family History   Problem Relation Age of Onset    Asthma Mother     Hypertension Mother     High Blood Pressure Mother     Diabetes Mother     Asthma Brother     High Blood Pressure Father        Vitals:    11/05/20 0830 11/05/20 1115 11/05/20 1218 11/05/20 1520   BP: (!) 178/94 (!) 174/92 (!) 168/94 (!) 162/90   Pulse: 104 103  100   Resp:  18  18   Temp:  98.9 °F (37.2 °C)  98.7 °F (37.1 °C)   TempSrc:  Temporal  Temporal   SpO2:  98%  96%   Weight:       Height:               Intake/Output Summary (Last 24 hours) at 11/5/2020 1623  Last data filed at 11/5/2020 1240  Gross per 24 hour   Intake 791.3 ml   Output 1633 ml   Net -841.7 ml         Recent Labs     11/03/20  0600 11/04/20  0600 11/05/20  0600   WBC 6.8 8.7 4.6   HGB 11.0* 10.3* 8.9*   HCT 35.2 32.7* 29.3*    272 257      Recent Labs     11/03/20 0600 11/04/20 0600 11/05/20 0600   BUN 46* 40* 35*   CREATININE 7.4* 7.5* 7.7*         ROS:   Negative for CP, palpitations, SOB at rest, dizziness/lightheadedness. Physical Exam   Constitutional: Oriented to person, place, and time. Appears well-developed. No distress. Cardiovascular: Normal rate, regular rhythm and normal heart sounds. Pulmonary/Chest: Effort normal. No respiratory distress. Abdominal: Soft. Bowel sounds are normal.   Musculoskeletal: Normal range of motion. Neurological: alert and oriented to person, place, and time. Skin: Skin is warm and dry. Psychiatric: normal mood and affect.            A/P: 30 yo female with hx of right atrial vegetation and mediport infection/vegetation      angiovac vegectomy tomorrow afternoon         Note: 25 minutes was spent providing face-to-face patient care, including:  and coordinating care, reviewing the chart, labs, and diagnostics, as well as medical decision making. Greater than 50% of this time was spent instructing and counseling the patient face to face regarding findings and recommendations.

## 2020-11-05 NOTE — CARE COORDINATION
SOCIAL WORK/CASEMANAGEMENT TRANSITION OF CARE [id number] [facility], [address], [place], [phone]): pt out of room for procedure. plan remains home. ID following with iv ancef and oral vanco. Pt on capd pta. With cycler. Sw/cm to follow.  Mandi Morse  11/5/2020

## 2020-11-05 NOTE — PROGRESS NOTES
[facility] re: Zofran given IV. Still c/o nausea. Currently requesting something other than Zofran. will await orders.
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
## 2020-11-05 NOTE — FLOWSHEET NOTE
CCPD complete without complications. 980 clear straw UF.     11/05/20 0615   Vitals   BP (!) 173/87   Pulse 113   Weight 136 lb 3.2 oz (61.8 kg)   Peritoneal Dialysis Catheter Left lower abdomen   Placement Date/Time: 10/31/20 0704   Catheter Location: Left lower abdomen   Status Deaccessed   Site Condition No Complications   Dressing Status Clean;Dry; Intact   Dressing Gauze   Cycler   Verification of Prescription CCPD   Total Volume Programmed 1000 mL   Therapy Time (Hours:Minutes) 9:47   Last Fill Volume 2000 mL   Ultrafiltration (UF) (mL) 983 mL   Lost Dwell Time (Hours:Minutes) 1:03

## 2020-11-06 ENCOUNTER — APPOINTMENT (OUTPATIENT)
Dept: GENERAL RADIOLOGY | Age: 30
DRG: 721 | End: 2020-11-06
Payer: COMMERCIAL

## 2020-11-06 VITALS — OXYGEN SATURATION: 95 % | RESPIRATION RATE: 3 BRPM | TEMPERATURE: 93.4 F

## 2020-11-06 LAB
ALBUMIN SERPL-MCNC: 3.9 G/DL (ref 3.5–5.2)
ALP BLD-CCNC: 101 U/L (ref 35–104)
ALT SERPL-CCNC: 32 U/L (ref 0–32)
ANION GAP SERPL CALCULATED.3IONS-SCNC: 14 MMOL/L (ref 7–16)
ANION GAP SERPL CALCULATED.3IONS-SCNC: 18 MMOL/L (ref 7–16)
AST SERPL-CCNC: 34 U/L (ref 0–31)
BASOPHILS ABSOLUTE: 0.08 E9/L (ref 0–0.2)
BASOPHILS RELATIVE PERCENT: 1.6 % (ref 0–2)
BILIRUB SERPL-MCNC: 0.3 MG/DL (ref 0–1.2)
BUN BLDV-MCNC: 32 MG/DL (ref 6–20)
BUN BLDV-MCNC: 32 MG/DL (ref 6–20)
CALCIUM SERPL-MCNC: 8.7 MG/DL (ref 8.6–10.2)
CALCIUM SERPL-MCNC: 9.2 MG/DL (ref 8.6–10.2)
CHLORIDE BLD-SCNC: 102 MMOL/L (ref 98–107)
CHLORIDE BLD-SCNC: 99 MMOL/L (ref 98–107)
CO2: 24 MMOL/L (ref 22–29)
CO2: 24 MMOL/L (ref 22–29)
CREAT SERPL-MCNC: 7.2 MG/DL (ref 0.5–1)
CREAT SERPL-MCNC: 8 MG/DL (ref 0.5–1)
EOSINOPHILS ABSOLUTE: 0.5 E9/L (ref 0.05–0.5)
EOSINOPHILS RELATIVE PERCENT: 9.7 % (ref 0–6)
GFR AFRICAN AMERICAN: 7
GFR AFRICAN AMERICAN: 8
GFR NON-AFRICAN AMERICAN: 7 ML/MIN/1.73
GFR NON-AFRICAN AMERICAN: 8 ML/MIN/1.73
GLUCOSE BLD-MCNC: 160 MG/DL (ref 74–99)
GLUCOSE BLD-MCNC: 311 MG/DL (ref 74–99)
HCT VFR BLD CALC: 28.9 % (ref 34–48)
HCT VFR BLD CALC: 29.6 % (ref 34–48)
HEMOGLOBIN: 8.9 G/DL (ref 11.5–15.5)
HEMOGLOBIN: 8.9 G/DL (ref 11.5–15.5)
IMMATURE GRANULOCYTES #: 0.02 E9/L
IMMATURE GRANULOCYTES %: 0.4 % (ref 0–5)
LYMPHOCYTES ABSOLUTE: 1.56 E9/L (ref 1.5–4)
LYMPHOCYTES RELATIVE PERCENT: 30.4 % (ref 20–42)
MAGNESIUM: 2.2 MG/DL (ref 1.6–2.6)
MAGNESIUM: 2.3 MG/DL (ref 1.6–2.6)
MCH RBC QN AUTO: 29.2 PG (ref 26–35)
MCH RBC QN AUTO: 30 PG (ref 26–35)
MCHC RBC AUTO-ENTMCNC: 30.1 % (ref 32–34.5)
MCHC RBC AUTO-ENTMCNC: 30.8 % (ref 32–34.5)
MCV RBC AUTO: 97 FL (ref 80–99.9)
MCV RBC AUTO: 97.3 FL (ref 80–99.9)
METER GLUCOSE: 110 MG/DL (ref 74–99)
METER GLUCOSE: 123 MG/DL (ref 74–99)
METER GLUCOSE: 160 MG/DL (ref 74–99)
METER GLUCOSE: 306 MG/DL (ref 74–99)
METER GLUCOSE: 368 MG/DL (ref 74–99)
METER GLUCOSE: 422 MG/DL (ref 74–99)
METER GLUCOSE: 432 MG/DL (ref 74–99)
METER GLUCOSE: 470 MG/DL (ref 74–99)
METER GLUCOSE: 488 MG/DL (ref 74–99)
MONOCYTES ABSOLUTE: 0.38 E9/L (ref 0.1–0.95)
MONOCYTES RELATIVE PERCENT: 7.4 % (ref 2–12)
NEUTROPHILS ABSOLUTE: 2.6 E9/L (ref 1.8–7.3)
NEUTROPHILS RELATIVE PERCENT: 50.5 % (ref 43–80)
PDW BLD-RTO: 15.9 FL (ref 11.5–15)
PDW BLD-RTO: 16 FL (ref 11.5–15)
PHOSPHORUS: 8.6 MG/DL (ref 2.5–4.5)
PLATELET # BLD: 183 E9/L (ref 130–450)
PLATELET # BLD: 247 E9/L (ref 130–450)
PMV BLD AUTO: 9.4 FL (ref 7–12)
PMV BLD AUTO: 9.9 FL (ref 7–12)
POTASSIUM SERPL-SCNC: 3.1 MMOL/L (ref 3.5–5)
POTASSIUM SERPL-SCNC: 3.3 MMOL/L (ref 3.5–5)
RBC # BLD: 2.97 E12/L (ref 3.5–5.5)
RBC # BLD: 3.05 E12/L (ref 3.5–5.5)
SODIUM BLD-SCNC: 140 MMOL/L (ref 132–146)
SODIUM BLD-SCNC: 141 MMOL/L (ref 132–146)
TOTAL PROTEIN: 5.7 G/DL (ref 6.4–8.3)
VANCOMYCIN RANDOM: 20.3 MCG/ML (ref 5–40)
WBC # BLD: 4.1 E9/L (ref 4.5–11.5)
WBC # BLD: 5.1 E9/L (ref 4.5–11.5)

## 2020-11-06 PROCEDURE — 6360000002 HC RX W HCPCS: Performed by: INTERNAL MEDICINE

## 2020-11-06 PROCEDURE — 2500000003 HC RX 250 WO HCPCS: Performed by: INTERNAL MEDICINE

## 2020-11-06 PROCEDURE — 6370000000 HC RX 637 (ALT 250 FOR IP): Performed by: INTERNAL MEDICINE

## 2020-11-06 PROCEDURE — 87075 CULTR BACTERIA EXCEPT BLOOD: CPT

## 2020-11-06 PROCEDURE — C1894 INTRO/SHEATH, NON-LASER: HCPCS | Performed by: THORACIC SURGERY (CARDIOTHORACIC VASCULAR SURGERY)

## 2020-11-06 PROCEDURE — 83735 ASSAY OF MAGNESIUM: CPT

## 2020-11-06 PROCEDURE — 2580000003 HC RX 258: Performed by: NURSE ANESTHETIST, CERTIFIED REGISTERED

## 2020-11-06 PROCEDURE — P9047 ALBUMIN (HUMAN), 25%, 50ML: HCPCS | Performed by: INTERNAL MEDICINE

## 2020-11-06 PROCEDURE — 3700000000 HC ANESTHESIA ATTENDED CARE: Performed by: THORACIC SURGERY (CARDIOTHORACIC VASCULAR SURGERY)

## 2020-11-06 PROCEDURE — 6370000000 HC RX 637 (ALT 250 FOR IP): Performed by: THORACIC SURGERY (CARDIOTHORACIC VASCULAR SURGERY)

## 2020-11-06 PROCEDURE — 6360000002 HC RX W HCPCS: Performed by: NURSE PRACTITIONER

## 2020-11-06 PROCEDURE — 02CV3ZZ EXTIRPATION OF MATTER FROM SUPERIOR VENA CAVA, PERCUTANEOUS APPROACH: ICD-10-PCS | Performed by: THORACIC SURGERY (CARDIOTHORACIC VASCULAR SURGERY)

## 2020-11-06 PROCEDURE — 2580000003 HC RX 258: Performed by: INTERNAL MEDICINE

## 2020-11-06 PROCEDURE — C1769 GUIDE WIRE: HCPCS | Performed by: THORACIC SURGERY (CARDIOTHORACIC VASCULAR SURGERY)

## 2020-11-06 PROCEDURE — 87102 FUNGUS ISOLATION CULTURE: CPT

## 2020-11-06 PROCEDURE — 6360000004 HC RX CONTRAST MEDICATION: Performed by: THORACIC SURGERY (CARDIOTHORACIC VASCULAR SURGERY)

## 2020-11-06 PROCEDURE — 36415 COLL VENOUS BLD VENIPUNCTURE: CPT

## 2020-11-06 PROCEDURE — 3600000004 HC SURGERY LEVEL 4 BASE: Performed by: THORACIC SURGERY (CARDIOTHORACIC VASCULAR SURGERY)

## 2020-11-06 PROCEDURE — 87205 SMEAR GRAM STAIN: CPT

## 2020-11-06 PROCEDURE — 2580000003 HC RX 258: Performed by: NURSE PRACTITIONER

## 2020-11-06 PROCEDURE — 88305 TISSUE EXAM BY PATHOLOGIST: CPT

## 2020-11-06 PROCEDURE — 6360000002 HC RX W HCPCS: Performed by: NURSE ANESTHETIST, CERTIFIED REGISTERED

## 2020-11-06 PROCEDURE — 33946 ECMO/ECLS INITIATION VENOUS: CPT | Performed by: THORACIC SURGERY (CARDIOTHORACIC VASCULAR SURGERY)

## 2020-11-06 PROCEDURE — 6360000002 HC RX W HCPCS: Performed by: ANESTHESIOLOGY

## 2020-11-06 PROCEDURE — 36592 COLLECT BLOOD FROM PICC: CPT

## 2020-11-06 PROCEDURE — 6370000000 HC RX 637 (ALT 250 FOR IP): Performed by: NURSE PRACTITIONER

## 2020-11-06 PROCEDURE — C1887 CATHETER, GUIDING: HCPCS | Performed by: THORACIC SURGERY (CARDIOTHORACIC VASCULAR SURGERY)

## 2020-11-06 PROCEDURE — 85027 COMPLETE CBC AUTOMATED: CPT

## 2020-11-06 PROCEDURE — 6360000002 HC RX W HCPCS: Performed by: THORACIC SURGERY (CARDIOTHORACIC VASCULAR SURGERY)

## 2020-11-06 PROCEDURE — 80053 COMPREHEN METABOLIC PANEL: CPT

## 2020-11-06 PROCEDURE — 87070 CULTURE OTHR SPECIMN AEROBIC: CPT

## 2020-11-06 PROCEDURE — 2140000000 HC CCU INTERMEDIATE R&B

## 2020-11-06 PROCEDURE — 2580000003 HC RX 258: Performed by: THORACIC SURGERY (CARDIOTHORACIC VASCULAR SURGERY)

## 2020-11-06 PROCEDURE — P9047 ALBUMIN (HUMAN), 25%, 50ML: HCPCS | Performed by: NURSE PRACTITIONER

## 2020-11-06 PROCEDURE — 84100 ASSAY OF PHOSPHORUS: CPT

## 2020-11-06 PROCEDURE — 33954 ECMO/ECLS INSJ PRPH CANNULA: CPT | Performed by: THORACIC SURGERY (CARDIOTHORACIC VASCULAR SURGERY)

## 2020-11-06 PROCEDURE — 71045 X-RAY EXAM CHEST 1 VIEW: CPT

## 2020-11-06 PROCEDURE — 7100000000 HC PACU RECOVERY - FIRST 15 MIN: Performed by: THORACIC SURGERY (CARDIOTHORACIC VASCULAR SURGERY)

## 2020-11-06 PROCEDURE — 3700000001 HC ADD 15 MINUTES (ANESTHESIA): Performed by: THORACIC SURGERY (CARDIOTHORACIC VASCULAR SURGERY)

## 2020-11-06 PROCEDURE — 85347 COAGULATION TIME ACTIVATED: CPT

## 2020-11-06 PROCEDURE — 82962 GLUCOSE BLOOD TEST: CPT

## 2020-11-06 PROCEDURE — 33999 UNLISTED PX CARDIAC SURGERY: CPT | Performed by: THORACIC SURGERY (CARDIOTHORACIC VASCULAR SURGERY)

## 2020-11-06 PROCEDURE — 85025 COMPLETE CBC W/AUTO DIFF WBC: CPT

## 2020-11-06 PROCEDURE — 2709999900 HC NON-CHARGEABLE SUPPLY: Performed by: THORACIC SURGERY (CARDIOTHORACIC VASCULAR SURGERY)

## 2020-11-06 PROCEDURE — 80202 ASSAY OF VANCOMYCIN: CPT

## 2020-11-06 PROCEDURE — 3600000014 HC SURGERY LEVEL 4 ADDTL 15MIN: Performed by: THORACIC SURGERY (CARDIOTHORACIC VASCULAR SURGERY)

## 2020-11-06 PROCEDURE — 80048 BASIC METABOLIC PNL TOTAL CA: CPT

## 2020-11-06 PROCEDURE — 7100000001 HC PACU RECOVERY - ADDTL 15 MIN: Performed by: THORACIC SURGERY (CARDIOTHORACIC VASCULAR SURGERY)

## 2020-11-06 PROCEDURE — 2720000010 HC SURG SUPPLY STERILE: Performed by: THORACIC SURGERY (CARDIOTHORACIC VASCULAR SURGERY)

## 2020-11-06 PROCEDURE — 2500000003 HC RX 250 WO HCPCS: Performed by: NURSE ANESTHETIST, CERTIFIED REGISTERED

## 2020-11-06 PROCEDURE — 99233 SBSQ HOSP IP/OBS HIGH 50: CPT | Performed by: INTERNAL MEDICINE

## 2020-11-06 PROCEDURE — 90945 DIALYSIS ONE EVALUATION: CPT

## 2020-11-06 RX ORDER — MEPERIDINE HYDROCHLORIDE 25 MG/ML
12.5 INJECTION INTRAMUSCULAR; INTRAVENOUS; SUBCUTANEOUS
Status: DISCONTINUED | OUTPATIENT
Start: 2020-11-06 | End: 2020-11-06 | Stop reason: HOSPADM

## 2020-11-06 RX ORDER — DEXAMETHASONE SODIUM PHOSPHATE 10 MG/ML
INJECTION, SOLUTION INTRAMUSCULAR; INTRAVENOUS PRN
Status: DISCONTINUED | OUTPATIENT
Start: 2020-11-06 | End: 2020-11-06 | Stop reason: SDUPTHER

## 2020-11-06 RX ORDER — HEPARIN SODIUM 10000 [USP'U]/ML
5000 INJECTION, SOLUTION INTRAVENOUS; SUBCUTANEOUS EVERY 8 HOURS SCHEDULED
Status: DISCONTINUED | OUTPATIENT
Start: 2020-11-07 | End: 2020-11-11 | Stop reason: HOSPADM

## 2020-11-06 RX ORDER — BISACODYL 10 MG
10 SUPPOSITORY, RECTAL RECTAL DAILY PRN
Status: DISCONTINUED | OUTPATIENT
Start: 2020-11-06 | End: 2020-11-11 | Stop reason: HOSPADM

## 2020-11-06 RX ORDER — DEXTROSE MONOHYDRATE 50 MG/ML
INJECTION, SOLUTION INTRAVENOUS CONTINUOUS
Status: DISCONTINUED | OUTPATIENT
Start: 2020-11-06 | End: 2020-11-07

## 2020-11-06 RX ORDER — DIPHENHYDRAMINE HYDROCHLORIDE 50 MG/ML
12.5 INJECTION INTRAMUSCULAR; INTRAVENOUS ONCE
Status: COMPLETED | OUTPATIENT
Start: 2020-11-06 | End: 2020-11-06

## 2020-11-06 RX ORDER — OXYCODONE HYDROCHLORIDE AND ACETAMINOPHEN 5; 325 MG/1; MG/1
1 TABLET ORAL PRN
Status: DISCONTINUED | OUTPATIENT
Start: 2020-11-06 | End: 2020-11-06 | Stop reason: HOSPADM

## 2020-11-06 RX ORDER — FENTANYL CITRATE 50 UG/ML
INJECTION, SOLUTION INTRAMUSCULAR; INTRAVENOUS PRN
Status: DISCONTINUED | OUTPATIENT
Start: 2020-11-06 | End: 2020-11-06 | Stop reason: SDUPTHER

## 2020-11-06 RX ORDER — PROTAMINE SULFATE 10 MG/ML
INJECTION, SOLUTION INTRAVENOUS PRN
Status: DISCONTINUED | OUTPATIENT
Start: 2020-11-06 | End: 2020-11-06 | Stop reason: SDUPTHER

## 2020-11-06 RX ORDER — ACETAMINOPHEN 325 MG/1
650 TABLET ORAL EVERY 6 HOURS
Status: DISPENSED | OUTPATIENT
Start: 2020-11-06 | End: 2020-11-08

## 2020-11-06 RX ORDER — ONDANSETRON 2 MG/ML
4 INJECTION INTRAMUSCULAR; INTRAVENOUS
Status: DISCONTINUED | OUTPATIENT
Start: 2020-11-06 | End: 2020-11-06 | Stop reason: HOSPADM

## 2020-11-06 RX ORDER — SODIUM CHLORIDE, SODIUM LACTATE, POTASSIUM CHLORIDE, CALCIUM CHLORIDE 600; 310; 30; 20 MG/100ML; MG/100ML; MG/100ML; MG/100ML
INJECTION, SOLUTION INTRAVENOUS CONTINUOUS PRN
Status: DISCONTINUED | OUTPATIENT
Start: 2020-11-06 | End: 2020-11-06 | Stop reason: SDUPTHER

## 2020-11-06 RX ORDER — OXYCODONE HYDROCHLORIDE AND ACETAMINOPHEN 5; 325 MG/1; MG/1
2 TABLET ORAL PRN
Status: DISCONTINUED | OUTPATIENT
Start: 2020-11-06 | End: 2020-11-06 | Stop reason: HOSPADM

## 2020-11-06 RX ORDER — POTASSIUM CHLORIDE 20 MEQ/1
20 TABLET, EXTENDED RELEASE ORAL 2 TIMES DAILY WITH MEALS
Status: COMPLETED | OUTPATIENT
Start: 2020-11-06 | End: 2020-11-06

## 2020-11-06 RX ORDER — MORPHINE SULFATE 2 MG/ML
2 INJECTION, SOLUTION INTRAMUSCULAR; INTRAVENOUS EVERY 5 MIN PRN
Status: DISCONTINUED | OUTPATIENT
Start: 2020-11-06 | End: 2020-11-06 | Stop reason: HOSPADM

## 2020-11-06 RX ORDER — ROCURONIUM BROMIDE 10 MG/ML
INJECTION, SOLUTION INTRAVENOUS PRN
Status: DISCONTINUED | OUTPATIENT
Start: 2020-11-06 | End: 2020-11-06 | Stop reason: SDUPTHER

## 2020-11-06 RX ORDER — POTASSIUM CHLORIDE 7.45 MG/ML
10 INJECTION INTRAVENOUS
Status: DISPENSED | OUTPATIENT
Start: 2020-11-06 | End: 2020-11-06

## 2020-11-06 RX ORDER — SODIUM CHLORIDE 9 MG/ML
INJECTION, SOLUTION INTRAVENOUS CONTINUOUS PRN
Status: DISCONTINUED | OUTPATIENT
Start: 2020-11-06 | End: 2020-11-06 | Stop reason: SDUPTHER

## 2020-11-06 RX ORDER — MAGNESIUM CARB/ALUMINUM HYDROX 105-160MG
TABLET,CHEWABLE ORAL PRN
Status: DISCONTINUED | OUTPATIENT
Start: 2020-11-06 | End: 2020-11-06 | Stop reason: HOSPADM

## 2020-11-06 RX ORDER — ONDANSETRON 2 MG/ML
INJECTION INTRAMUSCULAR; INTRAVENOUS PRN
Status: DISCONTINUED | OUTPATIENT
Start: 2020-11-06 | End: 2020-11-06 | Stop reason: SDUPTHER

## 2020-11-06 RX ORDER — HEPARIN SODIUM 1000 [USP'U]/ML
INJECTION, SOLUTION INTRAVENOUS; SUBCUTANEOUS PRN
Status: DISCONTINUED | OUTPATIENT
Start: 2020-11-06 | End: 2020-11-06 | Stop reason: SDUPTHER

## 2020-11-06 RX ORDER — MIDAZOLAM HYDROCHLORIDE 1 MG/ML
INJECTION INTRAMUSCULAR; INTRAVENOUS PRN
Status: DISCONTINUED | OUTPATIENT
Start: 2020-11-06 | End: 2020-11-06 | Stop reason: SDUPTHER

## 2020-11-06 RX ORDER — MORPHINE SULFATE 2 MG/ML
1 INJECTION, SOLUTION INTRAMUSCULAR; INTRAVENOUS EVERY 5 MIN PRN
Status: DISCONTINUED | OUTPATIENT
Start: 2020-11-06 | End: 2020-11-06 | Stop reason: HOSPADM

## 2020-11-06 RX ORDER — LIDOCAINE HYDROCHLORIDE 20 MG/ML
INJECTION, SOLUTION INTRAVENOUS PRN
Status: DISCONTINUED | OUTPATIENT
Start: 2020-11-06 | End: 2020-11-06 | Stop reason: SDUPTHER

## 2020-11-06 RX ORDER — PROPOFOL 10 MG/ML
INJECTION, EMULSION INTRAVENOUS PRN
Status: DISCONTINUED | OUTPATIENT
Start: 2020-11-06 | End: 2020-11-06 | Stop reason: SDUPTHER

## 2020-11-06 RX ADMIN — SODIUM CHLORIDE, PRESERVATIVE FREE 100 UNITS: 5 INJECTION INTRAVENOUS at 21:02

## 2020-11-06 RX ADMIN — EPOETIN ALFA-EPBX 5000 UNITS: 10000 INJECTION, SOLUTION INTRAVENOUS; SUBCUTANEOUS at 08:54

## 2020-11-06 RX ADMIN — Medication 10 ML: at 08:54

## 2020-11-06 RX ADMIN — LEVOTHYROXINE SODIUM 75 MCG: 0.07 TABLET ORAL at 05:40

## 2020-11-06 RX ADMIN — SODIUM CHLORIDE, POTASSIUM CHLORIDE, SODIUM LACTATE AND CALCIUM CHLORIDE: 600; 310; 30; 20 INJECTION, SOLUTION INTRAVENOUS at 10:28

## 2020-11-06 RX ADMIN — SEVELAMER CARBONATE 800 MG: 800 TABLET, FILM COATED ORAL at 17:13

## 2020-11-06 RX ADMIN — ALBUMIN (HUMAN) 25 G: 0.25 INJECTION, SOLUTION INTRAVENOUS at 21:01

## 2020-11-06 RX ADMIN — POTASSIUM CHLORIDE 10 MEQ: 7.46 INJECTION, SOLUTION INTRAVENOUS at 08:54

## 2020-11-06 RX ADMIN — ONDANSETRON HYDROCHLORIDE 4 MG: 2 INJECTION, SOLUTION INTRAMUSCULAR; INTRAVENOUS at 11:46

## 2020-11-06 RX ADMIN — OXYCODONE AND ACETAMINOPHEN 1 TABLET: 5; 325 TABLET ORAL at 22:09

## 2020-11-06 RX ADMIN — Medication 2 G: at 18:23

## 2020-11-06 RX ADMIN — HYDRALAZINE HYDROCHLORIDE 25 MG: 25 TABLET, FILM COATED ORAL at 14:53

## 2020-11-06 RX ADMIN — HYDRALAZINE HYDROCHLORIDE 25 MG: 25 TABLET, FILM COATED ORAL at 22:09

## 2020-11-06 RX ADMIN — SODIUM CHLORIDE: 9 INJECTION, SOLUTION INTRAVENOUS at 10:14

## 2020-11-06 RX ADMIN — PROTAMINE SULFATE 50 MG: 10 INJECTION, SOLUTION INTRAVENOUS at 11:47

## 2020-11-06 RX ADMIN — POTASSIUM CHLORIDE 20 MEQ: 1500 TABLET, EXTENDED RELEASE ORAL at 17:13

## 2020-11-06 RX ADMIN — HEPARIN SODIUM 7000 UNITS: 1000 INJECTION INTRAVENOUS; SUBCUTANEOUS at 11:20

## 2020-11-06 RX ADMIN — Medication 10 ML: at 21:02

## 2020-11-06 RX ADMIN — STANDARDIZED SENNA CONCENTRATE 8.6 MG: 8.6 TABLET ORAL at 21:01

## 2020-11-06 RX ADMIN — FENTANYL CITRATE 200 MCG: 50 INJECTION, SOLUTION INTRAMUSCULAR; INTRAVENOUS at 10:53

## 2020-11-06 RX ADMIN — INSULIN LISPRO 12 UNITS: 100 INJECTION, SOLUTION INTRAVENOUS; SUBCUTANEOUS at 16:04

## 2020-11-06 RX ADMIN — ROCURONIUM BROMIDE 40 MG: 10 INJECTION, SOLUTION INTRAVENOUS at 10:53

## 2020-11-06 RX ADMIN — MORPHINE SULFATE 2 MG: 2 INJECTION, SOLUTION INTRAMUSCULAR; INTRAVENOUS at 12:42

## 2020-11-06 RX ADMIN — INSULIN HUMAN 8 UNITS: 100 INJECTION, SOLUTION PARENTERAL at 03:31

## 2020-11-06 RX ADMIN — MIDAZOLAM 2 MG: 1 INJECTION INTRAMUSCULAR; INTRAVENOUS at 10:44

## 2020-11-06 RX ADMIN — ACETAMINOPHEN 650 MG: 325 TABLET, FILM COATED ORAL at 21:01

## 2020-11-06 RX ADMIN — SUGAMMADEX 500 MG: 100 INJECTION, SOLUTION INTRAVENOUS at 11:54

## 2020-11-06 RX ADMIN — POTASSIUM CHLORIDE 10 MEQ: 7.46 INJECTION, SOLUTION INTRAVENOUS at 14:23

## 2020-11-06 RX ADMIN — DILTIAZEM HYDROCHLORIDE 240 MG: 240 CAPSULE, EXTENDED RELEASE ORAL at 09:13

## 2020-11-06 RX ADMIN — DEXTROSE MONOHYDRATE: 50 INJECTION, SOLUTION INTRAVENOUS at 08:54

## 2020-11-06 RX ADMIN — LABETALOL HYDROCHLORIDE 10 MG: 5 INJECTION INTRAVENOUS at 12:29

## 2020-11-06 RX ADMIN — DIPHENHYDRAMINE HYDROCHLORIDE 12.5 MG: 50 INJECTION, SOLUTION INTRAMUSCULAR; INTRAVENOUS at 03:31

## 2020-11-06 RX ADMIN — DEXAMETHASONE SODIUM PHOSPHATE 10 MG: 10 INJECTION, SOLUTION INTRAMUSCULAR; INTRAVENOUS at 11:02

## 2020-11-06 RX ADMIN — INSULIN GLARGINE 8 UNITS: 100 INJECTION, SOLUTION SUBCUTANEOUS at 20:59

## 2020-11-06 RX ADMIN — BUMETANIDE 2 MG: 1 TABLET ORAL at 21:01

## 2020-11-06 RX ADMIN — Medication 2 G: at 10:58

## 2020-11-06 RX ADMIN — OXYCODONE AND ACETAMINOPHEN 1 TABLET: 5; 325 TABLET ORAL at 14:53

## 2020-11-06 RX ADMIN — HEPARIN SODIUM 2000 UNITS: 1000 INJECTION INTRAVENOUS; SUBCUTANEOUS at 11:30

## 2020-11-06 RX ADMIN — GENTAMICIN SULFATE: 1 CREAM TOPICAL at 09:13

## 2020-11-06 RX ADMIN — HYDRALAZINE HYDROCHLORIDE 25 MG: 25 TABLET, FILM COATED ORAL at 05:36

## 2020-11-06 RX ADMIN — ALBUMIN (HUMAN) 25 G: 0.25 INJECTION, SOLUTION INTRAVENOUS at 09:12

## 2020-11-06 RX ADMIN — LIDOCAINE HYDROCHLORIDE 100 MG: 20 INJECTION, SOLUTION INTRAVENOUS at 10:53

## 2020-11-06 RX ADMIN — PROPOFOL 150 MG: 10 INJECTION, EMULSION INTRAVENOUS at 10:53

## 2020-11-06 RX ADMIN — INSULIN LISPRO 8 UNITS: 100 INJECTION, SOLUTION INTRAVENOUS; SUBCUTANEOUS at 19:41

## 2020-11-06 ASSESSMENT — PULMONARY FUNCTION TESTS
PIF_VALUE: 22
PIF_VALUE: 26
PIF_VALUE: 25
PIF_VALUE: 24
PIF_VALUE: 24
PIF_VALUE: 25
PIF_VALUE: 26
PIF_VALUE: 23
PIF_VALUE: 2
PIF_VALUE: 24
PIF_VALUE: 1
PIF_VALUE: 25
PIF_VALUE: 7
PIF_VALUE: 1
PIF_VALUE: 3
PIF_VALUE: 25
PIF_VALUE: 22
PIF_VALUE: 26
PIF_VALUE: 1
PIF_VALUE: 26
PIF_VALUE: 1
PIF_VALUE: 24
PIF_VALUE: 24
PIF_VALUE: 26
PIF_VALUE: 27
PIF_VALUE: 24
PIF_VALUE: 24
PIF_VALUE: 25
PIF_VALUE: 17
PIF_VALUE: 1
PIF_VALUE: 26
PIF_VALUE: 1
PIF_VALUE: 26
PIF_VALUE: 22
PIF_VALUE: 25
PIF_VALUE: 23
PIF_VALUE: 24
PIF_VALUE: 24
PIF_VALUE: 3
PIF_VALUE: 25
PIF_VALUE: 26
PIF_VALUE: 25
PIF_VALUE: 22
PIF_VALUE: 24
PIF_VALUE: 2
PIF_VALUE: 0
PIF_VALUE: 2
PIF_VALUE: 1
PIF_VALUE: 22
PIF_VALUE: 23
PIF_VALUE: 11
PIF_VALUE: 26
PIF_VALUE: 1
PIF_VALUE: 24
PIF_VALUE: 25
PIF_VALUE: 24
PIF_VALUE: 25
PIF_VALUE: 19
PIF_VALUE: 24
PIF_VALUE: 26
PIF_VALUE: 24
PIF_VALUE: 25
PIF_VALUE: 22
PIF_VALUE: 0
PIF_VALUE: 22
PIF_VALUE: 25
PIF_VALUE: 23
PIF_VALUE: 26
PIF_VALUE: 26
PIF_VALUE: 24
PIF_VALUE: 9
PIF_VALUE: 26
PIF_VALUE: 26
PIF_VALUE: 24

## 2020-11-06 ASSESSMENT — PAIN SCALES - GENERAL
PAINLEVEL_OUTOF10: 7
PAINLEVEL_OUTOF10: 8
PAINLEVEL_OUTOF10: 3
PAINLEVEL_OUTOF10: 0
PAINLEVEL_OUTOF10: 7
PAINLEVEL_OUTOF10: 0

## 2020-11-06 ASSESSMENT — PAIN DESCRIPTION - PROGRESSION
CLINICAL_PROGRESSION: NOT CHANGED
CLINICAL_PROGRESSION: NOT CHANGED

## 2020-11-06 ASSESSMENT — PAIN DESCRIPTION - DESCRIPTORS: DESCRIPTORS: ACHING;CONSTANT;DISCOMFORT

## 2020-11-06 ASSESSMENT — PAIN DESCRIPTION - ORIENTATION: ORIENTATION: RIGHT;LEFT

## 2020-11-06 ASSESSMENT — PAIN SCALES - WONG BAKER
WONGBAKER_NUMERICALRESPONSE: 0
WONGBAKER_NUMERICALRESPONSE: 0

## 2020-11-06 ASSESSMENT — PAIN DESCRIPTION - LOCATION: LOCATION: GROIN

## 2020-11-06 ASSESSMENT — PAIN DESCRIPTION - FREQUENCY: FREQUENCY: CONTINUOUS

## 2020-11-06 ASSESSMENT — PAIN - FUNCTIONAL ASSESSMENT: PAIN_FUNCTIONAL_ASSESSMENT: PREVENTS OR INTERFERES SOME ACTIVE ACTIVITIES AND ADLS

## 2020-11-06 ASSESSMENT — PAIN DESCRIPTION - PAIN TYPE: TYPE: ACUTE PAIN;SURGICAL PAIN

## 2020-11-06 ASSESSMENT — PAIN DESCRIPTION - ONSET: ONSET: ON-GOING

## 2020-11-06 NOTE — FLOWSHEET NOTE
11/06/20 1737   Peritoneal Dialysis Catheter Left lower abdomen   Placement Date/Time: 10/31/20 0704   Catheter Location: Left lower abdomen   Status Accessed   Site Condition No Complications   Dressing Status Clean;Dry; Intact   Dressing Gauze   Cycler   Verification of Prescription CCPD   Total Volume Programmed 54417 mL   Therapy Time (Hours:Minutes) 9   Fill Volume 2000 mL   Last Fill Volume 2000 mL   Dextrose Setting Different (Extraneal)   Number of Cycles 5   Bag Volume 5000 mL   Number of Bags Used 3   Dianeal Solution   (1.5% x 2 2.5% x 1)   CCPD started via cath using aseptic technique and dressing changed. Draining and filling with out complications.

## 2020-11-06 NOTE — ANESTHESIA PROCEDURE NOTES
Procedure Performed: YULISSA     Start Time:        End Time:      Preanesthesia Checklist:  Patient identified, IV assessed, risks and benefits discussed, monitors and equipment assessed, procedure being performed at surgeon's request and anesthesia consent obtained. General Procedure Information  Diagnostic Indications for Echo:  assessment of surgical repair, hemodynamic monitoring and assessment of valve function  Physician Requesting Echo: Suyapa Zamudio MD  Location performed:  OR  Intubated  Bite block placed  Heart visualized  Probe Insertion:  Easy  Probe Type:  3D and mulitplane  Modalities:  3D, color flow mapping, pulse wave Doppler and continuous wave Doppler    Echocardiographic and Doppler Measurements    Ventricles    Right Ventricle:  Cavity size normal.  Hypertrophy not present. Thrombus not present. Global function normal.    Left Ventricle:  Cavity size normal.  Hypertrophy not present. Thrombus not present. Global Function normal.  Ejection Fraction 60%. Ventricular Regional Function:  1- Basal Anteroseptal:  normal  2- Basal Anterior:  normal  3- Basal Anterolateral:  normal  4- Basal Inferolateral:  normal  5- Basal Inferior:  normal  6- Basal Inferoseptal:  normal  7- Mid Anteroseptal:  normal  8- Mid Anterior:  normal  9- Mid Anterolateral:  normal  10- Mid Inferolateral:  normal  11- Mid Inferior:  normal  12- Mid Inferoseptal:  normal  13- Apical Anterior:  normal  14- Apical Lateral:  normal  15- Apical Inferior:  normal  16- Apical Septal:  normal  17- [place]:  normal      Valves    Aortic Valve: Annulus normal.  Stenosis not present. Regurgitation none. Leaflets normal.  Leaflet motions normal.      Mitral Valve: Annulus normal.  Stenosis not present. Regurgitation mild. Leaflets normal.  Leaflet motions normal.      Tricuspid Valve: Annulus normal.  Stenosis not present. Regurgitation mild. Leaflets normal.  Leaflet motions normal.    Pulmonic Valve:   Annulus normal.

## 2020-11-06 NOTE — FLOWSHEET NOTE
11/06/20 0700   Peritoneal Dialysis Catheter Left lower abdomen   Placement Date/Time: 10/31/20 7666   Catheter Location: Left lower abdomen   Status Accessed   Site Condition No Complications   Dressing Status Clean;Dry; Intact   Dressing Gauze   Peritoneal Dialysis (CAPD manual)   Effluent Appearance Clear;Light;Yellow   Cycler   Ultrafiltration (UF) (mL) 1527 mL   CCPD completed, cath de-accessed using aseptic technique. Net fluid removal 1527 ml of clear light yellow effluent noted, tolerated well.

## 2020-11-06 NOTE — FLOWSHEET NOTE
CCPD initiated as ordered. Dressing Not Changed, patient refused; First drain/fill observed without complications, Clear/Straw effluent noted. Please contact the dialysis nurse on call with any issues. 11/05/20 1957   Vitals   BP (!) 142/82   Temp 98.2 °F (36.8 °C)   Temp Source Temporal   Pulse 101   Resp 18   SpO2 98 %   Weight 137 lb 5.6 oz (62.3 kg)   Peritoneal Dialysis Catheter Left lower abdomen   Placement Date/Time: 10/31/20 0704   Catheter Location: Left lower abdomen   Status Accessed   Site Condition No Complications   Dressing Status Clean;Dry; Intact   Dressing Gauze   Cycler   Verification of Prescription CCPD   Total Volume Programmed 29674 mL   Therapy Time (Hours:Minutes) 9:00   Fill Volume 2000 mL   Last Fill Volume 2000 mL   Dextrose Setting Different (Extraneal)   Number of Cycles 5   Bag Volume 5000 mL   Number of Bags Used 3   Dianeal Solution Other (Comment)  (1.5 & 2.5 % dextrose in 5000ml)

## 2020-11-06 NOTE — PROGRESS NOTES
[facility]  Internal Medicine Residency Program  Progress Note - House Team 1    Patient:  Krysten Martinez 31 y.o. female MRN: [id number]     Date of Service: 11/5/2020     CC: Extremity fatigue and chest discomfort  Overnight events: Blood pressure high overnight, gave hydralazine 10 mg, Lopressor 5 mg and labetalol 10 mg. Blood glucose 355 early this morning. Subjective     Patient seen and examined at bedside this AM.    Patient states right upper extremity pain improved. She endorses generalized itchiness. Received the Benadryl and symptom improves. Denies headache, fever, chest pain, shortness breath, nausea, and abdominal pain. Objective     Physical Exam:  · Vitals: BP (!) 162/90   Pulse 100   Temp 98.7 °F (37.1 °C) (Temporal)   Resp 18   Ht 5' 4\" (1.626 m)   Wt 136 lb 3.2 oz (61.8 kg)   LMP 07/01/2020   SpO2 96%   BMI 23.38 kg/m²     · I & O - 24hr: No intake/output data recorded. · General Appearance: alert, appears stated age, cooperative and fatigued  · HEENT:  Head: Normal, normocephalic, atraumatic. · Eye: PERRL, EMOI  · Neck: no adenopathy, supple, symmetrical, trachea midline and thyroid not enlarged, symmetric, no tenderness/mass/nodules  · Lung: clear to auscultation bilaterally and No respiratory distress  · Heart: normal rate, regular rhythm, S1 and S2 normal, no murmurs, rubs or gallops  · Abdomen: soft, non-tender; bowel sounds normal; no masses,  no organomegaly and HD catheter in place  · Extremities:  extremities normal, atraumatic, no cyanosis or edema  · Musculokeletal: No joint swelling, no muscle tenderness. ROM normal in all joints of extremities.    · Neurologic: Mental status: Alert, oriented, thought content appropriate  Subject  Pertinent Labs & Imaging Studies   jorge alberto  CBC with Differential:    Lab Results   Component Value Date    WBC 4.6 11/05/2020    RBC 3.00 11/05/2020    HGB 8.9 11/05/2020    HCT 29.3 11/05/2020     11/05/2020 MCV 97.7 11/05/2020    MCH 29.7 11/05/2020    MCHC 30.4 11/05/2020    RDW 16.2 11/05/2020    NRBC 0.9 08/10/2020    SEGSPCT 67 06/27/2013    METASPCT 1.7 08/10/2020    LYMPHOPCT 38.1 11/05/2020    MONOPCT 8.1 11/05/2020    BASOPCT 2.0 11/05/2020    MONOSABS 0.37 11/05/2020    LYMPHSABS 1.75 11/05/2020    EOSABS 0.58 11/05/2020    BASOSABS 0.09 11/05/2020     CMP:    Lab Results   Component Value Date     11/05/2020    K 3.5 11/05/2020    K 3.7 10/11/2020    CL 96 11/05/2020    CO2 23 11/05/2020    BUN 35 11/05/2020    CREATININE 7.7 11/05/2020    GFRAA 8 11/05/2020    LABGLOM 8 11/05/2020    GLUCOSE 312 11/05/2020    GLUCOSE 130 05/18/2012    PROT 5.4 11/05/2020    LABALBU 3.5 11/05/2020    LABALBU 4.1 05/18/2012    CALCIUM 9.0 11/05/2020    BILITOT 0.2 11/05/2020    ALKPHOS 102 11/05/2020    AST 11 11/05/2020    ALT 19 11/05/2020       Resident's Assessment and Plan     [address] [facility] Erica is a 31 y.o. female with PMHx of ESRD on PD, T1DM, HTN, hypothyroidism, chronic diarrhea, opioid abuse, who presented for concerns of extremely fatigue and chest discomfort. 1.  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 Large 1 x 1 cm, irregular echogenic mass attached to the tip of the CVC suggestive of vegetation or thrombus. -CXR showing patchy opacities in the bilateral lower lungs which could represent atelectasis versus airspace disease process. -Mediport removed   -Cardiothoracic surgery consulted. Need to repeat YULISSA to assess for vegetation to decide antibiotics vs AngioVac vs sternotomy per thoracic surgeon.    -Cardiology consulted. -ID consulted.   Appreciate Abx recs -vancomycin 1 g IV x1   -YULISSA on 11/2/2020 reveals a right atrial mobile mass approximately 2.4 cm x 0.4 cm which appears to attach to the atrial septum at the junction of the SVC and RA. No evidence of valvular vegetations. LVEF 60 to 65%. Mild TR. Mild MR.   -Follow up with PA pressure   -Follow PFT   -Follow vancomycin trough   -Angiovac removal tomorrow per CTS. NPO after midnight    2. Type 1 diabetes mellitus, uncontrolled   -On Lantus 12 unit daily, Humalog 5 unit pre-meal at home    -11/3: Blood glucose labile. Resume D5W, encourage p.o. intake, dietary nutrition supplements   -Patient has poor oral intake. Blood glucose labile. Currently on Lantus 8 units daily, and medium dose sliding scale every 4 hours   -POCT glucose every 4 hours   -Ensure patient is on D5 IV    3. ESRD 2/2 diabetic neuropathy on PD   -Nephrology consulted, appreciate recs   -Continue CCPD   -Continue bumex, metolazone, lisinopril, cardizem per nephrology   -EPO 5000 units 3 times week    4. Hypertension   -On clonidine 0.1 mg twice daily as needed at home   -Currently on hydralazine 25 mg 3 times daily, lisinopril 20 mg daily, Cardizem 240 mg daily   -Hold lisinopril before procedure per cardiothoracic surgery   -Continue to monitor BP     5. Chronic diarrhea most likely 2/2 diabetic enteropathy, microscopic colitis   -Concern for C. Diff at [facility], PCR positive but EIA negative   -C. diff negative this admission   -Continue PO vancomycin 250 mg 4 times daily for total of 14 days    6. Hypothyroidism   -TSH 6, free T3 1.3, free T4 0.78   -Increase levothyroxine to 75 mg daily    7.  History of substance use    PT/OT evaluation: Not indicated at this time  DVT prophylaxis/ GI prophylaxis: Heparin/Pepcid  Disposition: Continue inpatient management    Alexei Spicer MD, PGY-1  Attending physician: Dr. Quynh Pettit  Department of Internal Medicine  Internal Medicine Residency / [address]    Attending Physician Statement    [address] case was discussed, including pertinent history and examination findings with the multidisciplinary team during bedside rounds. I have seen and examined the patient and the key elements of the encounter have been performed by myself. I have read and reviewed the documentation. If needed or disputed the following findings, counseling and treatment options which have been corrected and communicated back to the patient, family if applicable and contributing physicians. I agree with the assessment, plan and orders as noted by the resident.       Janey De Santiago DO, Enis Pale  Professor of Medicine  Pulmonary, [facility]  Internal Medicine Academic Faculty

## 2020-11-06 NOTE — PROGRESS NOTES
Department of Internal Medicine  Infectious Diseases  Progress Note      C/C :   Endocarditis     Pt is awake and alert   Denies fever or chills  Reports groin pain   Afebrile     Current Facility-Administered Medications   Medication Dose Route Frequency Provider Last Rate Last Dose    insulin regular (HUMULIN R;NOVOLIN R) injection 5 Units  5 Units Subcutaneous Once HEBER Vivar - CNP        dextrose 5 % solution   Intravenous Continuous HEBER Vivar CNP 50 mL/hr at 11/06/20 0854      acetaminophen (TYLENOL) tablet 650 mg  650 mg Oral Q6H HEBER Vivar - CNP        bisacodyl (DULCOLAX) suppository 10 mg  10 mg Rectal Daily PRN HEBER Vivar - CNP        ceFAZolin (ANCEF) 2 g in sterile water 20 mL IV syringe  2 g Intravenous Q8H HEBER Vivar CNP        [START ON 11/7/2020] heparin (porcine) injection 5,000 Units  5,000 Units Subcutaneous 3 times per day HEBER Vivar CNP        insulin lispro (HUMALOG) injection vial 0-12 Units  0-12 Units Subcutaneous Q4H HEBER Vivar - CNP        insulin glargine (LANTUS) injection vial 8 Units  8 Units Subcutaneous Nightly HEBER Vivar - CNP   8 Units at 11/04/20 2000    albumin human 25 % IV solution 25 g  25 g Intravenous BID HEBER Vivar - CNP   Stopped at 11/06/20 1010    lidocaine 4 % external patch 1 patch  1 patch Transdermal Daily HEBER Vivar - CNP   1 patch at 11/04/20 1321    hydrALAZINE (APRESOLINE) injection 10 mg  10 mg Intravenous Q4H PRN Sam Cadet APRN - CNP        labetalol (NORMODYNE;TRANDATE) injection 10 mg  10 mg Intravenous Q4H PRN HEBER Vivar - CNP   10 mg at 11/06/20 1229    sevelamer (RENVELA) tablet 800 mg  800 mg Oral TID WC HEBER Vivar - CNP   Stopped at 11/05/20 0813    senna (SENOKOT) tablet 8.6 mg  1 tablet Oral Nightly HEBER Vivar - CNP   8.6 mg at 11/05/20 2102    levothyroxine (SYNTHROID) tablet 75 mcg  75 mcg Oral QAM AC Karole Sicks, APRN - CNP   75 mcg at 11/06/20 0540    dilTIAZem (CARDIZEM CD) extended release capsule 240 mg  240 mg Oral Daily Karole Sicks, APRN - CNP   240 mg at 11/06/20 0913    bumetanide (BUMEX) tablet 2 mg  2 mg Oral BID Karole Sicks, APRN - CNP   Stopped at 11/06/20 0912    metOLazone (ZAROXOLYN) tablet 5 mg  5 mg Oral Daily Karole Sicks, APRN - CNP   Stopped at 11/06/20 0914    oxyCODONE-acetaminophen (PERCOCET) 5-325 MG per tablet 0.5 tablet  0.5 tablet Oral Q6H PRN Karole Sicks, APRN - CNP        Or    oxyCODONE-acetaminophen (PERCOCET) 5-325 MG per tablet 1 tablet  1 tablet Oral Q6H PRN Karole Sicks, APRN - CNP   1 tablet at 11/06/20 1453    hydrALAZINE (APRESOLINE) tablet 25 mg  25 mg Oral 3 times per day Karole Sicks, APRN - CNP   25 mg at 11/06/20 1453    lidocaine 1 % injection 5 mL  5 mL Intradermal Once Karole Sicks, APRN - CNP        sodium chloride flush 0.9 % injection 10 mL  10 mL Intravenous PRN Karole Sicks, APRN - CNP   10 mL at 11/03/20 2015    heparin flush 100 UNIT/ML injection 100 Units  1 mL Intravenous 2 times per day Karole Sicks, APRN - CNP   100 Units at 11/05/20 1039    heparin flush 100 UNIT/ML injection 100 Units  1 mL Intracatheter PRN Karole Sicks, APRN - CNP   100 Units at 11/05/20 0029    vancomycin (VANCOCIN) oral solution 250 mg  250 mg Oral 4 times per day Karole Sicks, APRN - CNP   250 mg at 11/06/20 0537    calcium gluconate 10 % injection 1 g  1 g Intravenous Once Karole Sicks, APRN - CNP        sodium chloride flush 0.9 % injection 10 mL  10 mL Intravenous 2 times per day Karole Sicks, APRN - CNP   10 mL at 11/06/20 0854    sodium chloride flush 0.9 % injection 10 mL  10 mL Intravenous PRN Karole Sicks, APRN - CNP   10 mL at 11/05/20 0029    acetaminophen (TYLENOL) tablet 650 mg  650 mg Oral Q6H PRN HEBER Hilton CNP   650 mg at 11/04/20 2331    Or    acetaminophen (TYLENOL) suppository 650 mg  650 mg Rectal Q6H PRN Sedaash Snowden, APRN - CNP        polyethylene glycol (GLYCOLAX) packet 17 g  17 g Oral Daily PRN Sedaash Snowden, APRN - CNP        promethazine (PHENERGAN) tablet 12.5 mg  12.5 mg Oral Q6H PRN Sedaash Snowden, APRN - CNP        Or    ondansetron (ZOFRAN) injection 4 mg  4 mg Intravenous Q6H PRN Sedaash Snowden, APRN - CNP   4 mg at 11/05/20 1703    glucose (GLUTOSE) 40 % oral gel 15 g  15 g Oral PRN Sedarhiannon River, APRN - CNP   15 g at 11/04/20 0836    dextrose 50 % IV solution  12.5 g Intravenous PRN [facility], APRN - CNP   12.5 g at 11/03/20 2321    glucagon (rDNA) injection 1 mg  1 mg Intramuscular PRN Seda Aleta, HEBER - CNP        dextrose 5 % solution  100 mL/hr Intravenous PRN Sedaash Snowden, APRN - CNP   Stopped at 11/04/20 8778    gentamicin (GARAMYCIN) 0.1 % cream   Topical Daily [facility] CNP        epoetin nena-epbx (RETACRIT) injection 5,000 Units  5,000 Units Subcutaneous Once per day on Mon Wed Fri Seda MARY SnowdenN - CNP   5,000 Units at 11/06/20 1765    famotidine (PEPCID) tablet 20 mg  20 mg Oral Daily [place] Aleta, APRN - CNP   Stopped at 11/05/20 7840    LORazepam (ATIVAN) tablet 0.5 mg  0.5 mg Oral BID PRN Sedarhiannon River, APRN - CNP   0.5 mg at 11/05/20 2985       REVIEW OF SYSTEMS:    CONSTITUTIONAL:  Denies fever, chill or rigors  HEENT: denies blurring of vision or double vision, denies hearing problem  RESPIRATORY: Denies SOB   CARDIOVASCULAR:  Reports chest pain   GASTROINTESTINAL: Nausea - improved   GENITOURINARY:  Denies burning urination or frequency of urination  INTEGUMENT: denies wound , rash  HEMATOLOGIC/LYMPHATIC:  No bleeding or bruise .   MUSCULOSKELETAL:  Denies leg pain , joint pain , joint swelling  NEUROLOGICAL:  Weakness            PHYSICAL EXAM:      Vitals:     BP (!) 144/85   Pulse 85   Temp 97.5 °F (36.4 °C) (Temporal)   Resp 16   Ht 5' 4\" (1.626 m)   Wt 136 lb 12.8 oz (62.1 kg)   LMP 07/01/2020   SpO2 96%   BMI 23.48 kg/m²     General Appearance:    Awake and alert, no distress    Head:    Normocephalic, atraumatic   Eyes:    No pallor, no icterus,   Ears:    No obvious deformity or drainage.    Nose:   No nasal drainage   Throat:   Mucosa moist, no oral thrush   Neck:   Supple, no lymphadenopathy   Lungs:     Clear to auscultation bilaterally,    Heart:    Tachycardic, systolic murmur +   Abdomen:     Soft, non-tender, bowel sounds present, PD catheter in place    Extremities:   No edema, no cyanosis   Pulses:   Dorsalis pedis palpable    Skin:   no rashes or lesions     CBC with Differential:      Lab Results   Component Value Date    WBC 4.1 11/06/2020    RBC 2.97 11/06/2020    HGB 8.9 11/06/2020    HCT 28.9 11/06/2020     11/06/2020    MCV 97.3 11/06/2020    MCH 30.0 11/06/2020    MCHC 30.8 11/06/2020    RDW 15.9 11/06/2020    NRBC 0.9 08/10/2020    SEGSPCT 67 06/27/2013    METASPCT 1.7 08/10/2020    LYMPHOPCT 30.4 11/06/2020    MONOPCT 7.4 11/06/2020    BASOPCT 1.6 11/06/2020    MONOSABS 0.38 11/06/2020    LYMPHSABS 1.56 11/06/2020    EOSABS 0.50 11/06/2020    BASOSABS 0.08 11/06/2020       CMP     Lab Results   Component Value Date     11/06/2020    K 3.3 11/06/2020    K 3.7 10/11/2020     11/06/2020    CO2 24 11/06/2020    BUN 32 11/06/2020    CREATININE 7.2 11/06/2020    GFRAA 8 11/06/2020    LABGLOM 8 11/06/2020    GLUCOSE 160 11/06/2020    GLUCOSE 130 05/18/2012    PROT 5.7 11/06/2020    LABALBU 3.9 11/06/2020    LABALBU 4.1 05/18/2012    CALCIUM 8.7 11/06/2020    BILITOT 0.3 11/06/2020    ALKPHOS 101 11/06/2020    AST 34 11/06/2020    ALT 32 11/06/2020         Hepatic Function Panel:    Lab Results   Component Value Date    ALKPHOS 101 11/06/2020    ALT 32 11/06/2020    AST 34 11/06/2020    PROT 5.7 11/06/2020    BILITOT 0.3 11/06/2020    BILIDIR <0.2 10/11/2020    IBILI see below 10/11/2020    LABALBU 3.9 11/06/2020    LABALBU 4.1 05/18/2012       PT/INR:    Lab Results   Component Value Date PROTIME 10.6 10/31/2020    PROTIME 13.6 08/14/2011    INR 1.0 10/31/2020       TSH:    Lab Results   Component Value Date    TSH 6.000 10/31/2020       U/A:    Lab Results   Component Value Date    COLORU Yellow 11/01/2020    PHUR 8.0 11/01/2020    LABCAST RARE 01/12/2020    WBCUA NONE 11/01/2020    WBCUA 0-1 05/18/2012    RBCUA 2-5 11/01/2020    RBCUA 1-3 08/01/2013    MUCUS Present 02/12/2016    TRICHOMONAS Present 07/17/2020    YEAST RARE 05/24/2018    BACTERIA FEW 11/01/2020    CLARITYU Clear 11/01/2020    SPECGRAV 1.020 11/01/2020    LEUKOCYTESUR Negative 11/01/2020    UROBILINOGEN 0.2 11/01/2020    BILIRUBINUR Negative 11/01/2020    BILIRUBINUR SMALL 05/18/2012    BLOODU SMALL 11/01/2020    GLUCOSEU 250 11/01/2020    GLUCOSEU >=1000 05/18/2012    AMORPHOUS RARE 11/01/2019       ABG:    Lab Results   Component Value Date    ZHZ0FHF 22.1 10/29/2019    L3QHZCAZ 97.7 09/08/2012    PHART 7.345 07/20/2012    YYW4VKL 35.0 10/29/2019    PO2ART 91.5 10/29/2019       MICROBIOLOGY:    Blood culture - Staph epidermidis  ( 10/8)     Vanco random level  (11/4)  20.3         YULISSA -     Summary    Ejection fraction is visually estimated at 60-65%.    There is a right atrial mobile mass approximately 2.4 cm x 0.4 cm, which    may represent a vegetation or mobile thrombus.    It attaches to the atrial septum superior to the fossa ovalis, at the    junction of the SVC and the RA.    Previously seen central venous catheter and associated mass are no longer    seen on this study.    Mild mitral regurgitation.    Mild tricuspid regurgitation.    No evidence of valvular vegetations       IMPRESSION:     1. Staph epidermidis bacteremia - endocarditis -s/p removal of the mediport   YULISSA - right atrial mass ? Clot vs vegetation ( 2.4 x0.4 cm ) s/p removal ( 11/6)   2. C diff infection ( at [facility] [place] on 10/25) - diarrhea stopped     RECOMMENDATIONS:      1. Vancomycin PRN    2. Oral vancomycin 250 mg po q 6 hrs   3.  Check cx ( 11/6)
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
## 2020-11-06 NOTE — PLAN OF CARE
Problem: Pain:  Goal: Pain level will decrease  Description: Pain level will decrease  11/6/2020 1117 by Praneeth Doshi RN  Outcome: Met This Shift     Problem: Skin Integrity:  Goal: Will show no infection signs and symptoms  Description: Will show no infection signs and symptoms  11/6/2020 1117 by Praneeth Doshi RN  Outcome: Met This Shift     Problem: Falls - Risk of:  Goal: Will remain free from falls  Description: Will remain free from falls  11/6/2020 1117 by Praneeth Doshi RN  Outcome: Met This Shift

## 2020-11-06 NOTE — PLAN OF CARE
Patient BS were elevated, 350x1, > 450 x2, she was NPO so did not get her long or short acting, we will order 8 units of regular insulin and monitor sugars closely.     Electronically signed by Daniel Rausch MD on 11/6/2020 at 3:26 AM

## 2020-11-06 NOTE — PLAN OF CARE
Problem: Pain:  Goal: Pain level will decrease  Description: Pain level will decrease  11/6/2020 0052 by Beltran Verma RN  Outcome: Ongoing     Problem: Skin Integrity:  Goal: Will show no infection signs and symptoms  Description: Will show no infection signs and symptoms  11/6/2020 0052 by Beltran Verma RN  Outcome: Met This Shift     Problem: Skin Integrity:  Goal: Absence of new skin breakdown  Description: Absence of new skin breakdown  Outcome: Met This Shift     Problem: Falls - Risk of:  Goal: Will remain free from falls  Description: Will remain free from falls  11/6/2020 0052 by Beltran Verma RN  Outcome: Met This Shift

## 2020-11-06 NOTE — ANESTHESIA POSTPROCEDURE EVALUATION
Department of Anesthesiology  Postprocedure Note    Patient: Kiera Dorsey  MRN: [id number]  YOB: 1990  Date of evaluation: 11/6/2020  Time:  12:38 PM     Procedure Summary     Date:  11/06/20 Room / Location:  [facility] / [facility]    Anesthesia Start:  3912 Anesthesia Stop:  3853    Procedure:  Tierra Huh, YULISSA -- REQS ROOM 3 (N/A ) Diagnosis:  (.)    Surgeon:  Bowen Aguirre MD Responsible Provider:  Barbara Miramontes DO    Anesthesia Type:  general ASA Status:  4          Anesthesia Type: general    Shilpa Phase I: Shilpa Score: 8    Shilpa Phase II:      Last vitals: Reviewed and per EMR flowsheets.        Anesthesia Post Evaluation    Patient location during evaluation: PACU  Patient participation: complete - patient participated  Level of consciousness: awake and alert  Airway patency: patent  Nausea & Vomiting: no nausea and no vomiting  Complications: no  Cardiovascular status: blood pressure returned to baseline  Respiratory status: acceptable  Hydration status: euvolemic

## 2020-11-06 NOTE — PROCEDURES
[address]                  [address] [facility],  [facility]                               PULMONARY FUNCTION    PATIENT NAME: Aida Alaniz                 :        1990  MED REC NO:   [id number]                            ROOM:       7171  ACCOUNT NO:   [de-identified]                           ADMIT DATE: 10/31/2020  PROVIDER:     Trey Estrada DO    DATE OF PROCEDURE:  2020    INDICATIONS:  A 68-year-old  woman who is 64 inches, 136 pounds  with endocarditis. FINDINGS:  Pulmonary function tests revealed forced vital capacity of  2.52 liters, 77% of predicted with an FEV1 of 2.35 liters, 84% of  predicted with an FEV1/FVC ratio of 93%. Midflow rates are normal.   Maximum voluntary ventilation 87 liters per minute, 79% of predicted. Static lung volumes with slow vital capacity of 2.47 liters, 75% of  predicted. Inspiratory capacity of 1.62 liters, 70% of predicted. Expiratory reserve volume 0.85 liters, 87% of predicted. Thoracic gas  volume of 2.56 liters, 92% of predicted. Residual volume 1.72 liters,  127% of predicted. Total lung capacity of 4.19 liters, 82% of predicted  with an RV/TLC ratio 157%. Diffusion capacity is fairly preserved,  17.10 mL/minute per mmHg which is 70% of predicted. IMPRESSION:  No airflow obstruction or restrictive pathology identified. No bronchodilator response or air trapping identified. Slightly reduced  diffusion capacity indicating a loss in gas transfer. Clinical correlation  needed.         Andie Cooper DO    D: 2020 14:26:56       T: 2020 14:34:59     [id number]  Job#: [id number]     Doc#: [id number]    CC:

## 2020-11-06 NOTE — ANESTHESIA PROCEDURE NOTES
Arterial Line:    An arterial line was placed using surface landmarks, in the holding area for the following indication(s): continuous blood pressure monitoring and blood sampling needed. A 20 gauge (size), 1 and 3/4 inch (length), Arrow (type) catheter was placed, Seldinger technique used, into the right radial artery, secured by tape and Tegaderm. Events:  patient tolerated procedure well with no complications and EBL < 5mL.   11/6/2020 10:00 AM11/6/2020 10:15 AM  Resident/CRNA: Radha Zuniga, APRN - CRNA  Performed: Resident/CRNA   Preanesthetic Checklist  Completed: patient identified, site marked, surgical consent, pre-op evaluation, timeout performed, IV checked, risks and benefits discussed, monitors and equipment checked, anesthesia consent given, oxygen available and patient being monitored

## 2020-11-06 NOTE — PROGRESS NOTES
Dr. Walter Collier notified of pt. Blood sugar 368. Was advised to stop DW5 and check sugar again in a few hours.      Antonio Solares RN

## 2020-11-06 NOTE — CARE COORDINATION
SOCIAL WORK/CASEMANAGEMENT TRANSITION OF CARE [id number] [facility], [address], Kris Members, [phone]): pt went for a AngioVac vegectomy with ctvs this a.m. prior to surgery pt ambulated and showered independently. Plan remains home. Vickey/donte to follow.  Radha Bhardwaj  11/6/2020

## 2020-11-06 NOTE — PROGRESS NOTES
Message sent to surgical resident regarding patient's blood sugar of 422, order taken to stop D5.         Shaina Hurd RN

## 2020-11-07 LAB
ALBUMIN SERPL-MCNC: 4.1 G/DL (ref 3.5–5.2)
ALP BLD-CCNC: 122 U/L (ref 35–104)
ALT SERPL-CCNC: 74 U/L (ref 0–32)
ANION GAP SERPL CALCULATED.3IONS-SCNC: 21 MMOL/L (ref 7–16)
AST SERPL-CCNC: 179 U/L (ref 0–31)
BASOPHILS ABSOLUTE: 0.03 E9/L (ref 0–0.2)
BASOPHILS RELATIVE PERCENT: 0.5 % (ref 0–2)
BETA-HYDROXYBUTYRATE: 0.11 MMOL/L (ref 0.02–0.27)
BILIRUB SERPL-MCNC: 0.2 MG/DL (ref 0–1.2)
BUN BLDV-MCNC: 34 MG/DL (ref 6–20)
CALCIUM SERPL-MCNC: 9.1 MG/DL (ref 8.6–10.2)
CHLORIDE BLD-SCNC: 97 MMOL/L (ref 98–107)
CO2: 21 MMOL/L (ref 22–29)
CREAT SERPL-MCNC: 7.7 MG/DL (ref 0.5–1)
EOSINOPHILS ABSOLUTE: 0 E9/L (ref 0.05–0.5)
EOSINOPHILS RELATIVE PERCENT: 0 % (ref 0–6)
GFR AFRICAN AMERICAN: 8
GFR NON-AFRICAN AMERICAN: 8 ML/MIN/1.73
GLUCOSE BLD-MCNC: 297 MG/DL (ref 74–99)
GRAM STAIN ORDERABLE: NORMAL
HCT VFR BLD CALC: 30.1 % (ref 34–48)
HEMOGLOBIN: 9.5 G/DL (ref 11.5–15.5)
IMMATURE GRANULOCYTES #: 0.03 E9/L
IMMATURE GRANULOCYTES %: 0.5 % (ref 0–5)
LACTIC ACID, SEPSIS: 4 MMOL/L (ref 0.5–1.9)
LYMPHOCYTES ABSOLUTE: 0.65 E9/L (ref 1.5–4)
LYMPHOCYTES RELATIVE PERCENT: 10.2 % (ref 20–42)
MAGNESIUM: 2.1 MG/DL (ref 1.6–2.6)
MCH RBC QN AUTO: 30 PG (ref 26–35)
MCHC RBC AUTO-ENTMCNC: 31.6 % (ref 32–34.5)
MCV RBC AUTO: 95 FL (ref 80–99.9)
METER GLUCOSE: 145 MG/DL (ref 74–99)
METER GLUCOSE: 206 MG/DL (ref 74–99)
METER GLUCOSE: 238 MG/DL (ref 74–99)
METER GLUCOSE: 294 MG/DL (ref 74–99)
METER GLUCOSE: 348 MG/DL (ref 74–99)
MONOCYTES ABSOLUTE: 0.04 E9/L (ref 0.1–0.95)
MONOCYTES RELATIVE PERCENT: 0.6 % (ref 2–12)
NEUTROPHILS ABSOLUTE: 5.62 E9/L (ref 1.8–7.3)
NEUTROPHILS RELATIVE PERCENT: 88.2 % (ref 43–80)
PDW BLD-RTO: 15.9 FL (ref 11.5–15)
PHOSPHORUS: 8.1 MG/DL (ref 2.5–4.5)
PLATELET # BLD: 261 E9/L (ref 130–450)
PMV BLD AUTO: 10.5 FL (ref 7–12)
POTASSIUM SERPL-SCNC: 4.3 MMOL/L (ref 3.5–5)
RBC # BLD: 3.17 E12/L (ref 3.5–5.5)
SODIUM BLD-SCNC: 139 MMOL/L (ref 132–146)
TOTAL PROTEIN: 6 G/DL (ref 6.4–8.3)
WBC # BLD: 6.4 E9/L (ref 4.5–11.5)

## 2020-11-07 PROCEDURE — 6370000000 HC RX 637 (ALT 250 FOR IP): Performed by: NURSE PRACTITIONER

## 2020-11-07 PROCEDURE — 80053 COMPREHEN METABOLIC PANEL: CPT

## 2020-11-07 PROCEDURE — 2580000003 HC RX 258: Performed by: NURSE PRACTITIONER

## 2020-11-07 PROCEDURE — 82010 KETONE BODYS QUAN: CPT

## 2020-11-07 PROCEDURE — 2580000003 HC RX 258: Performed by: INTERNAL MEDICINE

## 2020-11-07 PROCEDURE — 6360000002 HC RX W HCPCS: Performed by: NURSE PRACTITIONER

## 2020-11-07 PROCEDURE — 82962 GLUCOSE BLOOD TEST: CPT

## 2020-11-07 PROCEDURE — 87040 BLOOD CULTURE FOR BACTERIA: CPT

## 2020-11-07 PROCEDURE — 85025 COMPLETE CBC W/AUTO DIFF WBC: CPT

## 2020-11-07 PROCEDURE — 36415 COLL VENOUS BLD VENIPUNCTURE: CPT

## 2020-11-07 PROCEDURE — 83735 ASSAY OF MAGNESIUM: CPT

## 2020-11-07 PROCEDURE — 2140000000 HC CCU INTERMEDIATE R&B

## 2020-11-07 PROCEDURE — 99231 SBSQ HOSP IP/OBS SF/LOW 25: CPT | Performed by: INTERNAL MEDICINE

## 2020-11-07 PROCEDURE — 36592 COLLECT BLOOD FROM PICC: CPT

## 2020-11-07 PROCEDURE — 84100 ASSAY OF PHOSPHORUS: CPT

## 2020-11-07 PROCEDURE — 2500000003 HC RX 250 WO HCPCS: Performed by: NURSE PRACTITIONER

## 2020-11-07 PROCEDURE — 83605 ASSAY OF LACTIC ACID: CPT

## 2020-11-07 PROCEDURE — 90945 DIALYSIS ONE EVALUATION: CPT

## 2020-11-07 PROCEDURE — 6360000002 HC RX W HCPCS: Performed by: INTERNAL MEDICINE

## 2020-11-07 RX ORDER — DIPHENHYDRAMINE HYDROCHLORIDE 50 MG/ML
12.5 INJECTION INTRAMUSCULAR; INTRAVENOUS ONCE
Status: COMPLETED | OUTPATIENT
Start: 2020-11-07 | End: 2020-11-07

## 2020-11-07 RX ORDER — DEXTROSE AND SODIUM CHLORIDE 5; .45 G/100ML; G/100ML
INJECTION, SOLUTION INTRAVENOUS CONTINUOUS
Status: DISCONTINUED | OUTPATIENT
Start: 2020-11-07 | End: 2020-11-11 | Stop reason: HOSPADM

## 2020-11-07 RX ADMIN — HEPARIN SODIUM 5000 UNITS: 10000 INJECTION INTRAVENOUS; SUBCUTANEOUS at 14:39

## 2020-11-07 RX ADMIN — LABETALOL HYDROCHLORIDE 10 MG: 5 INJECTION INTRAVENOUS at 03:36

## 2020-11-07 RX ADMIN — DEXTROSE AND SODIUM CHLORIDE: 5; 450 INJECTION, SOLUTION INTRAVENOUS at 09:10

## 2020-11-07 RX ADMIN — LORAZEPAM 0.5 MG: 0.5 TABLET ORAL at 03:36

## 2020-11-07 RX ADMIN — ACETAMINOPHEN 650 MG: 325 TABLET, FILM COATED ORAL at 20:26

## 2020-11-07 RX ADMIN — INSULIN LISPRO 8 UNITS: 100 INJECTION, SOLUTION INTRAVENOUS; SUBCUTANEOUS at 20:38

## 2020-11-07 RX ADMIN — INSULIN LISPRO 4 UNITS: 100 INJECTION, SOLUTION INTRAVENOUS; SUBCUTANEOUS at 11:41

## 2020-11-07 RX ADMIN — Medication 10 ML: at 09:15

## 2020-11-07 RX ADMIN — INSULIN LISPRO 6 UNITS: 100 INJECTION, SOLUTION INTRAVENOUS; SUBCUTANEOUS at 06:50

## 2020-11-07 RX ADMIN — STANDARDIZED SENNA CONCENTRATE 8.6 MG: 8.6 TABLET ORAL at 20:26

## 2020-11-07 RX ADMIN — SEVELAMER CARBONATE 800 MG: 800 TABLET, FILM COATED ORAL at 17:59

## 2020-11-07 RX ADMIN — INSULIN LISPRO 4 UNITS: 100 INJECTION, SOLUTION INTRAVENOUS; SUBCUTANEOUS at 03:19

## 2020-11-07 RX ADMIN — HYDRALAZINE HYDROCHLORIDE 25 MG: 25 TABLET, FILM COATED ORAL at 14:39

## 2020-11-07 RX ADMIN — INSULIN GLARGINE 8 UNITS: 100 INJECTION, SOLUTION SUBCUTANEOUS at 20:39

## 2020-11-07 RX ADMIN — ACETAMINOPHEN 650 MG: 325 TABLET, FILM COATED ORAL at 02:40

## 2020-11-07 RX ADMIN — DILTIAZEM HYDROCHLORIDE 240 MG: 240 CAPSULE, EXTENDED RELEASE ORAL at 09:18

## 2020-11-07 RX ADMIN — Medication 2 G: at 03:29

## 2020-11-07 RX ADMIN — OXYCODONE AND ACETAMINOPHEN 1 TABLET: 5; 325 TABLET ORAL at 04:57

## 2020-11-07 RX ADMIN — DIPHENHYDRAMINE HYDROCHLORIDE 12.5 MG: 50 INJECTION, SOLUTION INTRAMUSCULAR; INTRAVENOUS at 12:06

## 2020-11-07 RX ADMIN — HYDRALAZINE HYDROCHLORIDE 25 MG: 25 TABLET, FILM COATED ORAL at 09:20

## 2020-11-07 RX ADMIN — HEPARIN SODIUM 5000 UNITS: 10000 INJECTION INTRAVENOUS; SUBCUTANEOUS at 06:41

## 2020-11-07 RX ADMIN — HYDRALAZINE HYDROCHLORIDE 25 MG: 25 TABLET, FILM COATED ORAL at 22:18

## 2020-11-07 RX ADMIN — DEXTROSE AND SODIUM CHLORIDE: 5; 450 INJECTION, SOLUTION INTRAVENOUS at 20:34

## 2020-11-07 RX ADMIN — SODIUM CHLORIDE, PRESERVATIVE FREE 10 ML: 5 INJECTION INTRAVENOUS at 12:06

## 2020-11-07 RX ADMIN — SEVELAMER CARBONATE 800 MG: 800 TABLET, FILM COATED ORAL at 11:41

## 2020-11-07 RX ADMIN — METOLAZONE 5 MG: 5 TABLET ORAL at 09:18

## 2020-11-07 RX ADMIN — GENTAMICIN SULFATE: 1 CREAM TOPICAL at 09:19

## 2020-11-07 RX ADMIN — BUMETANIDE 2 MG: 1 TABLET ORAL at 09:18

## 2020-11-07 RX ADMIN — FAMOTIDINE 20 MG: 20 TABLET ORAL at 09:18

## 2020-11-07 RX ADMIN — ACETAMINOPHEN 650 MG: 325 TABLET, FILM COATED ORAL at 14:39

## 2020-11-07 RX ADMIN — SEVELAMER CARBONATE 800 MG: 800 TABLET, FILM COATED ORAL at 09:18

## 2020-11-07 RX ADMIN — OXYCODONE AND ACETAMINOPHEN 1 TABLET: 5; 325 TABLET ORAL at 18:01

## 2020-11-07 RX ADMIN — ACETAMINOPHEN 650 MG: 325 TABLET, FILM COATED ORAL at 09:18

## 2020-11-07 RX ADMIN — HEPARIN 100 UNITS: 100 SYRINGE at 20:26

## 2020-11-07 RX ADMIN — BUMETANIDE 2 MG: 1 TABLET ORAL at 20:26

## 2020-11-07 RX ADMIN — HYDRALAZINE HYDROCHLORIDE 10 MG: 20 INJECTION, SOLUTION INTRAMUSCULAR; INTRAVENOUS at 05:30

## 2020-11-07 RX ADMIN — HEPARIN SODIUM 5000 UNITS: 10000 INJECTION INTRAVENOUS; SUBCUTANEOUS at 22:18

## 2020-11-07 RX ADMIN — LEVOTHYROXINE SODIUM 75 MCG: 0.07 TABLET ORAL at 06:41

## 2020-11-07 ASSESSMENT — PAIN SCALES - GENERAL
PAINLEVEL_OUTOF10: 10
PAINLEVEL_OUTOF10: 8
PAINLEVEL_OUTOF10: 0
PAINLEVEL_OUTOF10: 4
PAINLEVEL_OUTOF10: 8
PAINLEVEL_OUTOF10: 5
PAINLEVEL_OUTOF10: 7
PAINLEVEL_OUTOF10: 8
PAINLEVEL_OUTOF10: 8
PAINLEVEL_OUTOF10: 0

## 2020-11-07 ASSESSMENT — PAIN DESCRIPTION - ONSET: ONSET: ON-GOING

## 2020-11-07 ASSESSMENT — PAIN - FUNCTIONAL ASSESSMENT: PAIN_FUNCTIONAL_ASSESSMENT: PREVENTS OR INTERFERES SOME ACTIVE ACTIVITIES AND ADLS

## 2020-11-07 ASSESSMENT — PAIN DESCRIPTION - PROGRESSION
CLINICAL_PROGRESSION: NOT CHANGED

## 2020-11-07 ASSESSMENT — PAIN DESCRIPTION - PAIN TYPE: TYPE: ACUTE PAIN;SURGICAL PAIN

## 2020-11-07 ASSESSMENT — PAIN DESCRIPTION - ORIENTATION: ORIENTATION: LEFT;RIGHT

## 2020-11-07 ASSESSMENT — PAIN DESCRIPTION - DIRECTION: RADIATING_TOWARDS: HIP

## 2020-11-07 ASSESSMENT — PAIN SCALES - WONG BAKER
WONGBAKER_NUMERICALRESPONSE: 0
WONGBAKER_NUMERICALRESPONSE: 0

## 2020-11-07 ASSESSMENT — PAIN DESCRIPTION - LOCATION: LOCATION: GROIN

## 2020-11-07 ASSESSMENT — PAIN DESCRIPTION - DESCRIPTORS: DESCRIPTORS: ACHING;BURNING;DISCOMFORT

## 2020-11-07 ASSESSMENT — PAIN DESCRIPTION - FREQUENCY: FREQUENCY: CONTINUOUS

## 2020-11-07 NOTE — OP NOTE
[address]                  [address] [facility],  [facility]                                OPERATIVE REPORT    PATIENT NAME: Sharrie Koyanagi                 :        1990  MED REC NO:   [id number]                            ROOM:       [facility]  ACCOUNT NO:   [de-identified]                           ADMIT DATE: 10/31/2020  PROVIDER:     Brad Burnette MD    DATE OF PROCEDURE:  2020    PREOPERATIVE DIAGNOSIS:  Vegetation with bacteremia. POSTOPERATIVE DIAGNOSIS:  Vegetation with bacteremia. INDICATION:  Vegetation with bacteremia. SURGEON:  Brad Burnette MD    CO-SURGEON:  Elsa Hsu DO (no other resident was adequately trained  was available to assist). COMPLICATIONS:  None, tolerated well. ESTIMATED BLOOD LOSS:  Less than 50 mL. ANESTHESIA:  General endotracheal.    ANESTHESIA ATTENDING:  Sherri Celestin DO    SPECIMENS OBTAINED:  Vegetation for culture and pathology. FINDINGS:  There was a 2.5-cm vegetation and at the end of the  operation, there was no remaining vegetation. OPERATIONS:  1. Ultrasound guidance for vascular access. 2.  Veno-veno bypass. 3.  AngioVAC vegectomy. HISTORY:  This is a 29-year-old female with a history of renal failure. She had a Tesio catheter in place and then came in bacteremic. A YULISSA  was done, which showed a large vegetation at the junction of the SVC and  the right atrium. She was referred for removal.  The patient was deemed  a candidate for an [address]. The patient was described in full  including risks and complications including, but not limited to,  bleeding, infection, need for reoperation, hemothorax, pneumothorax,  stroke, myocardial infarction, and death, and the patient agreed to  proceed.     DESCRIPTION OF PROCEDURE:  After adequate informed consent was obtained  and adequate preoperative antibiotics were given, the patient was  brought to the operating room in stable condition. She was laid in  supine position and induced under general endotracheal anesthesia by  anesthesia staff. Her chest, abdomen, and pelvis were prepped and  draped in usual sterile fashion. Ultrasound guidance was used to gain  vascular access to the right and left femoral veins respectively. The  6-Luxembourger sheaths were placed. Through the left 6-Luxembourger sheath after  heparinization, a guidewire was advanced and this was dilated up to a  20-Luxembourger cannula, which was then secured to the veno-veno bypass  circuit at the return. In the right groin then, this was dilated up and  then a 26-Luxembourger DrySeal was placed over a Lunderquist wire. The  Lunderquist wire was advanced into the SVC through a angled glide  catheter, which was placed there over a J-wire. Once the DrySeal was in  place, the [address] was then advanced into the IVC. The dilator was  removed and we initiated the veno-veno bypass. This was then advanced  up into the right atrium and in the SVC multiple times until there was  no remaining vegetation. Satisfied with this, the [address] was then  removed and all the pumped blood was returned back to the patient via  the left femoral return cannula. Protamine was given. The groin lines  were removed and pursestring sutures were placed for hemostasis. Pressure was also held. The patient tolerated the procedure well. The  patient was extubated on the table and transferred to recovery room in  stable condition. All sponge, instrument, and needle counts were  correct at the end of the case.       Nakia Rojo MD    D: 11/06/2020 11:56:54       T: 11/06/2020 13:25:04     [id number]  Job#: [id number]     Doc#: [id number]    CC:  MD Ladonna Molina MD Minda Roll, MD Artelia Feinstein, MD Laurette Asal, MD Blanquita Pizarro MD Watt Gums, MD Janeth Reaper, MD Roann Forte, MD MD Pilo Walker MD Spencer Lofts, MD Kristy Schiller, MD

## 2020-11-07 NOTE — PROGRESS NOTES
Department of Internal Medicine  Infectious Diseases  Progress Note      C/C :   Endocarditis     Pt is awake and alert   Denies fever or chills  Barely responding to questions   Afebrile     Current Facility-Administered Medications   Medication Dose Route Frequency Provider Last Rate Last Dose    dextrose 5 % and 0.45 % sodium chloride infusion   Intravenous Continuous Sophie Higgins  mL/hr at 11/07/20 0910      insulin regular (HUMULIN R;NOVOLIN R) injection 5 Units  5 Units Subcutaneous Once HEBER Garica CNP        acetaminophen (TYLENOL) tablet 650 mg  650 mg Oral Q6H HEBER Garcia CNP   650 mg at 11/07/20 1439    bisacodyl (DULCOLAX) suppository 10 mg  10 mg Rectal Daily PRN HEBER Garcia CNP        heparin (porcine) injection 5,000 Units  5,000 Units Subcutaneous 3 times per day HEBER Garcia CNP   5,000 Units at 11/07/20 1439    insulin lispro (HUMALOG) injection vial 0-12 Units  0-12 Units Subcutaneous Q4H HEBER Garcia CNP   4 Units at 11/07/20 1141    insulin glargine (LANTUS) injection vial 8 Units  8 Units Subcutaneous Nightly HEBER Garcia CNP   8 Units at 11/06/20 2059    lidocaine 4 % external patch 1 patch  1 patch Transdermal Daily HEBER Garcia CNP   1 patch at 11/04/20 1321    hydrALAZINE (APRESOLINE) injection 10 mg  10 mg Intravenous Q4H PRN HEBER Garcia CNP   10 mg at 11/07/20 0530    labetalol (NORMODYNE;TRANDATE) injection 10 mg  10 mg Intravenous Q4H PRN HEBER Garcia CNP   10 mg at 11/07/20 0336    sevelamer (RENVELA) tablet 800 mg  800 mg Oral TID WC HEBER Garcia CNP   800 mg at 11/07/20 1141    senna (SENOKOT) tablet 8.6 mg  1 tablet Oral Nightly HEBER Garcia CNP   8.6 mg at 11/06/20 2101    levothyroxine (SYNTHROID) tablet 75 mcg  75 mcg Oral QAM AC HEBER Garcia CNP   75 mcg at 11/07/20 0641    dilTIAZem (CARDIZEM CD) extended release capsule 240 mg  240 mg  promethazine (PHENERGAN) tablet 12.5 mg  12.5 mg Oral Q6H PRN Amy Kemps, APRN - CNP        Or    [facility]) injection 4 mg  4 mg Intravenous Q6H PRN Amy Kemps, APRN - CNP   4 mg at 11/05/20 1703    glucose (GLUTOSE) 40 % oral gel 15 g  15 g Oral PRN Amy Kemps, APRN - CNP   15 g at 11/04/20 0836    dextrose 50 % IV solution  12.5 g Intravenous PRN Amy Kemps, APRN - CNP   12.5 g at 11/03/20 1560    glucagon (rDNA) injection 1 mg  1 mg Intramuscular PRN Amy Kemps, APRN - CNP        dextrose 5 % solution  100 mL/hr Intravenous PRN Amy Kemps, APRN - CNP   Stopped at 11/04/20 1588    gentamicin (GARAMYCIN) 0.1 % cream   Topical Daily Amy Kemps, APRN - CNP        epoetin nena-epbx (RETACRIT) injection 5,000 Units  5,000 Units Subcutaneous Once per day on Mon Wed Fri Amy Kemps, APRN - CNP   5,000 Units at 11/06/20 0854    famotidine (PEPCID) tablet 20 mg  20 mg Oral Daily Amy Kemps, APRN - CNP   20 mg at 11/07/20 0918    LORazepam (ATIVAN) tablet 0.5 mg  0.5 mg Oral BID PRN Amy Kemps, APRN - CNP   0.5 mg at 11/07/20 0336       PHYSICAL EXAM:      Vitals:     BP (!) 147/75   Pulse 100   Temp 98.8 °F (37.1 °C) (Temporal)   Resp 16   Ht 5' 4\" (1.626 m)   Wt 138 lb 14.4 oz (63 kg)   LMP 07/01/2020   SpO2 97%   BMI 23.84 kg/m²     General Appearance:    Awakens to voice, no distress    Lungs:     Clear to auscultation bilaterally,    Heart:    Tachycardic, systolic murmur +   Abdomen:     Soft, non-tender, bowel sounds present, PD catheter in place    Extremities:   No edema, no cyanosis   Pulses:   Dorsalis pedis palpable    Skin:   no rashes or lesions     CBC with Differential:      Lab Results   Component Value Date    WBC 6.4 11/07/2020    RBC 3.17 11/07/2020    HGB 9.5 11/07/2020    HCT 30.1 11/07/2020     11/07/2020    MCV 95.0 11/07/2020    MCH 30.0 11/07/2020    MCHC 31.6 11/07/2020    RDW 15.9 11/07/2020    NRBC 0.9 08/10/2020 SEGSPCT 67 06/27/2013    METASPCT 1.7 08/10/2020    LYMPHOPCT 10.2 11/07/2020    MONOPCT 0.6 11/07/2020    BASOPCT 0.5 11/07/2020    MONOSABS 0.04 11/07/2020    LYMPHSABS 0.65 11/07/2020    EOSABS 0.00 11/07/2020    BASOSABS 0.03 11/07/2020       CMP     Lab Results   Component Value Date     11/07/2020    K 4.3 11/07/2020    K 3.7 10/11/2020    CL 97 11/07/2020    CO2 21 11/07/2020    BUN 34 11/07/2020    CREATININE 7.7 11/07/2020    GFRAA 8 11/07/2020    LABGLOM 8 11/07/2020    GLUCOSE 297 11/07/2020    GLUCOSE 130 05/18/2012    PROT 6.0 11/07/2020    LABALBU 4.1 11/07/2020    LABALBU 4.1 05/18/2012    CALCIUM 9.1 11/07/2020    BILITOT 0.2 11/07/2020    ALKPHOS 122 11/07/2020     11/07/2020    ALT 74 11/07/2020         Hepatic Function Panel:    Lab Results   Component Value Date    ALKPHOS 122 11/07/2020    ALT 74 11/07/2020     11/07/2020    PROT 6.0 11/07/2020    BILITOT 0.2 11/07/2020    BILIDIR <0.2 10/11/2020    IBILI see below 10/11/2020    LABALBU 4.1 11/07/2020    LABALBU 4.1 05/18/2012       PT/INR:    Lab Results   Component Value Date    PROTIME 10.6 10/31/2020    PROTIME 13.6 08/14/2011    INR 1.0 10/31/2020       TSH:    Lab Results   Component Value Date    TSH 6.000 10/31/2020       U/A:    Lab Results   Component Value Date    COLORU Yellow 11/01/2020    PHUR 8.0 11/01/2020    LABCAST RARE 01/12/2020    WBCUA NONE 11/01/2020    WBCUA 0-1 05/18/2012    RBCUA 2-5 11/01/2020    RBCUA 1-3 08/01/2013    MUCUS Present 02/12/2016    TRICHOMONAS Present 07/17/2020    YEAST RARE 05/24/2018    BACTERIA FEW 11/01/2020    CLARITYU Clear 11/01/2020    SPECGRAV 1.020 11/01/2020    LEUKOCYTESUR Negative 11/01/2020    UROBILINOGEN 0.2 11/01/2020    BILIRUBINUR Negative 11/01/2020    BILIRUBINUR SMALL 05/18/2012    BLOODU SMALL 11/01/2020    GLUCOSEU 250 11/01/2020    GLUCOSEU >=1000 05/18/2012    AMORPHOUS RARE 11/01/2019       ABG:    Lab Results   Component Value Date    SZW1WPB 22.1 10/29/2019    X5VMFOZH 97.7 09/08/2012    PHART 7.345 07/20/2012    WKF8AFC 35.0 10/29/2019    PO2ART 91.5 10/29/2019       MICROBIOLOGY:    Blood culture - Staph epidermidis  ( 10/8)     Vanco random level  (11/4)  20.3         YULISSA -     Summary    Ejection fraction is visually estimated at 60-65%.    There is a right atrial mobile mass approximately 2.4 cm x 0.4 cm, which    may represent a vegetation or mobile thrombus.    It attaches to the atrial septum superior to the fossa ovalis, at the    junction of the SVC and the RA.    Previously seen central venous catheter and associated mass are no longer    seen on this study.    Mild mitral regurgitation.    Mild tricuspid regurgitation.    No evidence of valvular vegetations       IMPRESSION:     1. Staph epidermidis bacteremia - endocarditis -s/p removal of the mediport   YULISSA - right atrial mass ? Clot vs vegetation ( 2.4 x0.4 cm ) s/p removal ( 11/6)   2. C diff infection ( at [facility] on 10/25) - diarrhea stopped     RECOMMENDATIONS:      1. Vancomycin PRN    2. Oral vancomycin 250 mg po q 6 hrs   3. Check blood and tissue cx ( 11/6)   4. Check random vancomycin level tomorrow.

## 2020-11-07 NOTE — PROGRESS NOTES
Pt refusing to be scanned or straight cathed. Discussed concern but pt continues to refuse. Resident in room at time and notified. Asked pt to let me know if she does use the restroom to try and collect sample.

## 2020-11-07 NOTE — PROGRESS NOTES
[facility]  Internal Medicine Residency Program  Progress Note - House Team 1    Patient:  Krysten Martinez 31 y.o. female MRN: [id number]     Date of Service: 11/7/2020     CC: Extremity fatigue and chest discomfort  Overnight events: no acute event overnight    Subjective     Patient seen and examined at bedside this AM.    Patient endorses pain at surgical sites, 8/10, affect her sleep last night, given tylenol with minimal improvement. Denies fever, chills, chest pain, SOB, palpitations, lightheadedness, or abdominal pain. Objective     Physical Exam:  · Vitals: BP (!) 147/75   Pulse 100   Temp 98.8 °F (37.1 °C) (Temporal)   Resp 16   Ht 5' 4\" (1.626 m)   Wt 138 lb 14.4 oz (63 kg)   LMP 07/01/2020   SpO2 97%   BMI 23.84 kg/m²     I & O - 24hr: I/O this shift:  In: -   · Out: 1058    · General Appearance: alert, appears stated age, cooperative and fatigued  · HEENT:  Head: Normal, normocephalic, atraumatic. · Eye: PERRL, EMOI  · Neck: no adenopathy, supple, symmetrical, trachea midline and thyroid not enlarged, symmetric, no tenderness/mass/nodules  · Lung: clear to auscultation bilaterally and No respiratory distress  · Heart: normal rate, regular rhythm, S1 and S2 normal, no murmurs, rubs or gallops  · Abdomen: soft, non-tender; bowel sounds normal; no masses,  no organomegaly and HD catheter in place  · Extremities:  extremities normal, atraumatic, no cyanosis or edema. Incision sites on left and right groid covered with dressing. · Musculokeletal: No joint swelling, no muscle tenderness. ROM normal in all joints of extremities.    · Neurologic: Mental status: Alert, oriented, thought content appropriate  Subject  Pertinent Labs & Imaging Studies   jorge alberto  CBC with Differential:    Lab Results   Component Value Date    WBC 6.4 11/07/2020    RBC 3.17 11/07/2020    HGB 9.5 11/07/2020    HCT 30.1 11/07/2020     11/07/2020    MCV 95.0 11/07/2020    MCH 30.0 11/07/2020 MCHC 31.6 11/07/2020    RDW 15.9 11/07/2020    NRBC 0.9 08/10/2020    SEGSPCT 67 06/27/2013    METASPCT 1.7 08/10/2020    LYMPHOPCT 10.2 11/07/2020    MONOPCT 0.6 11/07/2020    BASOPCT 0.5 11/07/2020    MONOSABS 0.04 11/07/2020    LYMPHSABS 0.65 11/07/2020    EOSABS 0.00 11/07/2020    BASOSABS 0.03 11/07/2020     CMP:    Lab Results   Component Value Date     11/07/2020    K 4.3 11/07/2020    K 3.7 10/11/2020    CL 97 11/07/2020    CO2 21 11/07/2020    BUN 34 11/07/2020    CREATININE 7.7 11/07/2020    GFRAA 8 11/07/2020    LABGLOM 8 11/07/2020    GLUCOSE 297 11/07/2020    GLUCOSE 130 05/18/2012    PROT 6.0 11/07/2020    LABALBU 4.1 11/07/2020    LABALBU 4.1 05/18/2012    CALCIUM 9.1 11/07/2020    BILITOT 0.2 11/07/2020    ALKPHOS 122 11/07/2020     11/07/2020    ALT 74 11/07/2020       Resident's Assessment and Plan     [address] [facility] Erica is a 31 y.o. female with PMHx of ESRD on PD, T1DM, HTN, hypothyroidism, chronic diarrhea, opioid abuse, who presented for concerns of extremely fatigue and chest discomfort. 1.  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 Large 1 x 1 cm, irregular echogenic mass attached to the tip of the CVC suggestive of vegetation or thrombus. -CXR showing patchy opacities in the bilateral lower lungs which could represent atelectasis versus airspace disease process. -Mediport removed   -Cardiothoracic surgery consulted. Need to repeat YULISSA to assess for vegetation to decide antibiotics vs AngioVac vs sternotomy per thoracic surgeon.    -Cardiology consulted. -ID consulted.   Appreciate Abx recs -vancomycin 1 g IV x1   -YULISSA on 11/2/2020 reveals a right atrial mobile mass approximately 2.4 cm x 0.4 cm which appears to attach to the atrial septum at the junction of the SVC and RA. No evidence of valvular vegetations. LVEF 60 to 65%. Mild TR. Mild MR.   -Follow up with PA pressure   -Follow PFT   -Follow vancomycin trough   -Angiovac vegectomy on 11/6   -Surgical tissue Gram stain negative   -Follow cultures and VS   -Encourage incentive spirometry    2. Type 1 diabetes mellitus, uncontrolled   -On Lantus 12 unit daily, Humalog 5 unit pre-meal at home    -11/3: Blood glucose labile. Resume D5W, encourage p.o. intake, dietary nutrition supplements   -Patient has poor oral intake. Blood glucose labile. Currently on Lantus 8 units daily, and medium dose sliding scale every 4 hours   -POCT glucose every 4 hours   -11/7: blood glucose 294, anion gap 21, bicarb 21. Lactic acid 4.0, beta-hydroxybutyrate 0.11. Start D5W + 1/2 NS and continue to monitor blood glucose    3. ESRD 2/2 diabetic neuropathy on PD   -Nephrology consulted, appreciate recs   -Continue CCPD   -Continue bumex, metolazone, lisinopril, cardizem per nephrology   -EPO 5000 units 3 times week    4. Hypertension   -On clonidine 0.1 mg twice daily as needed at home   -Currently on hydralazine 25 mg 3 times daily, lisinopril 20 mg daily, Cardizem 240 mg daily   -Hold lisinopril before procedure per cardiothoracic surgery   -Continue to monitor BP     5. Chronic diarrhea most likely 2/2 diabetic enteropathy, microscopic colitis   -Concern for C. Diff at F, PCR positive but EIA negative   -C. diff negative this admission   -Continue PO vancomycin 250 mg 4 times daily for total of 14 days    6. Hypothyroidism   -TSH 6, free T3 1.3, free T4 0.78   -Increase levothyroxine to 75 mg daily    7. History of substance use    PT/OT evaluation: Not indicated at this time  DVT prophylaxis/ GI prophylaxis: Heparin/Pepcid  Disposition: Continue inpatient management. Discharge planning.     Yolanda Chaney MD, PGY-1  Attending physician: Dr. Kacie Hernandez

## 2020-11-07 NOTE — PROGRESS NOTES
[facility]  Internal Medicine Residency Program  Progress Note - House Team 1    Patient:  Andi Schwartz 31 y.o. female MRN: [id number]     Date of Service: 11/6/2020     CC: Extremity fatigue and chest discomfort  Overnight events: Patient was n.p.o. last night and did not receive insulin. Blood glucose 488 at 3 AM.  Give 8 units regular insulin, repeats blood glucose in 1 hour 432. D5 water discontinued. Blood glucose 110 at 8 AM.    Subjective     Patient seen and examined at bedside this AM.    No new complaints. She ambulated and showered independently prior to surgery this a.m. Denies headache, fever, chest pain, shortness of breath, nausea, vomiting, or abdominal pain. Angiovac today. Objective     Physical Exam:  · Vitals: /86   Pulse 88   Temp 97.8 °F (36.6 °C) (Infrared)   Resp 14   Ht 5' 4\" (1.626 m)   Wt 136 lb 12.8 oz (62.1 kg)   LMP 07/01/2020   SpO2 96%   BMI 23.48 kg/m²     · I & O - 24hr: No intake/output data recorded. · General Appearance: alert, appears stated age, cooperative and fatigued  · HEENT:  Head: Normal, normocephalic, atraumatic. · Eye: PERRL, EMOI  · Neck: no adenopathy, supple, symmetrical, trachea midline and thyroid not enlarged, symmetric, no tenderness/mass/nodules  · Lung: clear to auscultation bilaterally and No respiratory distress  · Heart: normal rate, regular rhythm, S1 and S2 normal, no murmurs, rubs or gallops  · Abdomen: soft, non-tender; bowel sounds normal; no masses,  no organomegaly and HD catheter in place  · Extremities:  extremities normal, atraumatic, no cyanosis or edema  · Musculokeletal: No joint swelling, no muscle tenderness. ROM normal in all joints of extremities.    · Neurologic: Mental status: Alert, oriented, thought content appropriate  Subject  Pertinent Labs & Imaging Studies   jorge alberto  CBC with Differential:    Lab Results   Component Value Date    WBC 4.1 11/06/2020    RBC 2.97 11/06/2020    HGB 8.9 11/06/2020    HCT 28.9 11/06/2020     11/06/2020    MCV 97.3 11/06/2020    MCH 30.0 11/06/2020    MCHC 30.8 11/06/2020    RDW 15.9 11/06/2020    NRBC 0.9 08/10/2020    SEGSPCT 67 06/27/2013    METASPCT 1.7 08/10/2020    LYMPHOPCT 30.4 11/06/2020    MONOPCT 7.4 11/06/2020    BASOPCT 1.6 11/06/2020    MONOSABS 0.38 11/06/2020    LYMPHSABS 1.56 11/06/2020    EOSABS 0.50 11/06/2020    BASOSABS 0.08 11/06/2020     CMP:    Lab Results   Component Value Date     11/06/2020    K 3.3 11/06/2020    K 3.7 10/11/2020     11/06/2020    CO2 24 11/06/2020    BUN 32 11/06/2020    CREATININE 7.2 11/06/2020    GFRAA 8 11/06/2020    LABGLOM 8 11/06/2020    GLUCOSE 160 11/06/2020    GLUCOSE 130 05/18/2012    PROT 5.7 11/06/2020    LABALBU 3.9 11/06/2020    LABALBU 4.1 05/18/2012    CALCIUM 8.7 11/06/2020    BILITOT 0.3 11/06/2020    ALKPHOS 101 11/06/2020    AST 34 11/06/2020    ALT 32 11/06/2020       Resident's Assessment and Plan     [address] [facility] is a 31 y.o. female with PMHx of ESRD on PD, T1DM, HTN, hypothyroidism, chronic diarrhea, opioid abuse, who presented for concerns of extremely fatigue and chest discomfort. 1.  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 Large 1 x 1 cm, irregular echogenic mass attached to the tip of the CVC suggestive of vegetation or thrombus. -CXR showing patchy opacities in the bilateral lower lungs which could represent atelectasis versus airspace disease process. -Mediport removed   -Cardiothoracic surgery consulted. Need to repeat YULISSA to assess for vegetation to decide antibiotics vs AngioVac vs sternotomy per thoracic surgeon.    -Cardiology consulted. -ID consulted.   Appreciate Abx recs -vancomycin 1 g IV x1   -YULISSA on 11/2/2020 reveals a right atrial mobile mass approximately 2.4 cm x 0.4 cm which appears to attach to the atrial septum at the junction of the SVC and RA. No evidence of valvular vegetations. LVEF 60 to 65%. Mild TR. Mild MR.   -Follow up with PA pressure   -Follow PFT   -Follow vancomycin trough   -Angiovac vegectomy on 11/6   -Follow cultures    2. Type 1 diabetes mellitus, uncontrolled   -On Lantus 12 unit daily, Humalog 5 unit pre-meal at home    -11/3: Blood glucose labile. Resume D5W, encourage p.o. intake, dietary nutrition supplements   -Patient has poor oral intake. Blood glucose labile. Currently on Lantus 8 units daily, and medium dose sliding scale every 4 hours   -POCT glucose every 4 hours   -Ensure patient is on D5W IV    3. ESRD 2/2 diabetic neuropathy on PD   -Nephrology consulted, appreciate recs   -Continue CCPD   -Continue bumex, metolazone, lisinopril, cardizem per nephrology   -EPO 5000 units 3 times week    4. Hypertension   -On clonidine 0.1 mg twice daily as needed at home   -Currently on hydralazine 25 mg 3 times daily, lisinopril 20 mg daily, Cardizem 240 mg daily   -Hold lisinopril before procedure per cardiothoracic surgery   -Continue to monitor BP     5. Chronic diarrhea most likely 2/2 diabetic enteropathy, microscopic colitis   -Concern for C. Diff at F, PCR positive but EIA negative   -C. diff negative this admission   -Continue PO vancomycin 250 mg 4 times daily for total of 14 days    6. Hypothyroidism   -TSH 6, free T3 1.3, free T4 0.78   -Increase levothyroxine to 75 mg daily    7. History of substance use    PT/OT evaluation: Not indicated at this time  DVT prophylaxis/ GI prophylaxis: Heparin/Pepcid  Disposition: Continue inpatient management. Discharge planning.     Lucian Torres MD, PGY-1  Attending physician: Dr. Vamsi Lucia  Department of Internal Medicine  Internal Medicine Residency / [address]    Attending Physician Statement    Karen Giron case was discussed, including pertinent history and examination findings with the multidisciplinary team during bedside rounds. I have seen and examined the patient and the key elements of the encounter have been performed by myself. I have read and reviewed the documentation. If needed or disputed the following findings, counseling and treatment options which have been corrected and communicated back to the patient, family if applicable and contributing physicians. I agree with the assessment, plan and orders as noted by the resident.       Nitin Hanna DO, Bronwen Latino  Professor of Medicine  Pulmonary, [facility] Sleep Medicine  Internal Medicine Academic Faculty
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
## 2020-11-07 NOTE — PROGRESS NOTES
[facility]  Internal Medicine Residency / [address]    Attending Physician Statement, covering attending  I have discussed the case, including pertinent history and exam findings with the resident and the team.  I have seen and examined the patient, reviewed meds and pertinent labs and the key elements of the encounter have been performed by me. I agree with the assessment, plan and orders as documented by the resident. Patient c/o bilateral inguinal pain. Had AngioVac vegectomy yesterday. Glycemic control sub-optimal, med team to adjust insulin. Remainder of medical problems as per resident note.       Jose Wilkins  Internal Medicine Residency Faculty

## 2020-11-08 LAB
ALBUMIN SERPL-MCNC: 3.8 G/DL (ref 3.5–5.2)
ALP BLD-CCNC: 115 U/L (ref 35–104)
ALT SERPL-CCNC: <5 U/L (ref 0–32)
ANION GAP SERPL CALCULATED.3IONS-SCNC: 20 MMOL/L (ref 7–16)
AST SERPL-CCNC: 39 U/L (ref 0–31)
BASOPHILS ABSOLUTE: 0.11 E9/L (ref 0–0.2)
BASOPHILS RELATIVE PERCENT: 0.9 % (ref 0–2)
BILIRUB SERPL-MCNC: <0.2 MG/DL (ref 0–1.2)
BLOOD BANK DISPENSE STATUS: NORMAL
BLOOD BANK PRODUCT CODE: NORMAL
BPU ID: NORMAL
BUN BLDV-MCNC: 32 MG/DL (ref 6–20)
CALCIUM SERPL-MCNC: 9.1 MG/DL (ref 8.6–10.2)
CHLORIDE BLD-SCNC: 102 MMOL/L (ref 98–107)
CO2: 22 MMOL/L (ref 22–29)
CREAT SERPL-MCNC: 7.9 MG/DL (ref 0.5–1)
CULTURE SURGICAL: NORMAL
DESCRIPTION BLOOD BANK: NORMAL
EOSINOPHILS ABSOLUTE: 0.11 E9/L (ref 0.05–0.5)
EOSINOPHILS RELATIVE PERCENT: 0.9 % (ref 0–6)
GFR AFRICAN AMERICAN: 7
GFR NON-AFRICAN AMERICAN: 7 ML/MIN/1.73
GLUCOSE BLD-MCNC: 20 MG/DL (ref 74–99)
HCT VFR BLD CALC: 30.7 % (ref 34–48)
HEMOGLOBIN: 9.7 G/DL (ref 11.5–15.5)
IMMATURE GRANULOCYTES #: 0.05 E9/L
IMMATURE GRANULOCYTES %: 0.4 % (ref 0–5)
LYMPHOCYTES ABSOLUTE: 3.37 E9/L (ref 1.5–4)
LYMPHOCYTES RELATIVE PERCENT: 28.9 % (ref 20–42)
MAGNESIUM: 2.1 MG/DL (ref 1.6–2.6)
MCH RBC QN AUTO: 29.7 PG (ref 26–35)
MCHC RBC AUTO-ENTMCNC: 31.6 % (ref 32–34.5)
MCV RBC AUTO: 93.9 FL (ref 80–99.9)
METER GLUCOSE: 109 MG/DL (ref 74–99)
METER GLUCOSE: 113 MG/DL (ref 74–99)
METER GLUCOSE: 114 MG/DL (ref 74–99)
METER GLUCOSE: 141 MG/DL (ref 74–99)
METER GLUCOSE: 150 MG/DL (ref 74–99)
METER GLUCOSE: 315 MG/DL (ref 74–99)
METER GLUCOSE: 378 MG/DL (ref 74–99)
METER GLUCOSE: <40 MG/DL (ref 74–99)
MONOCYTES ABSOLUTE: 0.92 E9/L (ref 0.1–0.95)
MONOCYTES RELATIVE PERCENT: 7.9 % (ref 2–12)
NEUTROPHILS ABSOLUTE: 7.1 E9/L (ref 1.8–7.3)
NEUTROPHILS RELATIVE PERCENT: 61 % (ref 43–80)
PDW BLD-RTO: 15.8 FL (ref 11.5–15)
PHOSPHORUS: 8.5 MG/DL (ref 2.5–4.5)
PLATELET # BLD: 299 E9/L (ref 130–450)
PMV BLD AUTO: 10.1 FL (ref 7–12)
POTASSIUM SERPL-SCNC: 2.9 MMOL/L (ref 3.5–5)
RBC # BLD: 3.27 E12/L (ref 3.5–5.5)
SODIUM BLD-SCNC: 144 MMOL/L (ref 132–146)
TOTAL PROTEIN: 5.6 G/DL (ref 6.4–8.3)
VANCOMYCIN RANDOM: 15.7 MCG/ML (ref 5–40)
WBC # BLD: 11.7 E9/L (ref 4.5–11.5)

## 2020-11-08 PROCEDURE — 6370000000 HC RX 637 (ALT 250 FOR IP): Performed by: NURSE PRACTITIONER

## 2020-11-08 PROCEDURE — 80053 COMPREHEN METABOLIC PANEL: CPT

## 2020-11-08 PROCEDURE — 36415 COLL VENOUS BLD VENIPUNCTURE: CPT

## 2020-11-08 PROCEDURE — 2580000003 HC RX 258: Performed by: INTERNAL MEDICINE

## 2020-11-08 PROCEDURE — 82962 GLUCOSE BLOOD TEST: CPT

## 2020-11-08 PROCEDURE — 90945 DIALYSIS ONE EVALUATION: CPT | Performed by: INTERNAL MEDICINE

## 2020-11-08 PROCEDURE — 6360000002 HC RX W HCPCS: Performed by: INTERNAL MEDICINE

## 2020-11-08 PROCEDURE — 85025 COMPLETE CBC W/AUTO DIFF WBC: CPT

## 2020-11-08 PROCEDURE — 2140000000 HC CCU INTERMEDIATE R&B

## 2020-11-08 PROCEDURE — 6360000002 HC RX W HCPCS: Performed by: NURSE PRACTITIONER

## 2020-11-08 PROCEDURE — 6370000000 HC RX 637 (ALT 250 FOR IP): Performed by: INTERNAL MEDICINE

## 2020-11-08 PROCEDURE — 2580000003 HC RX 258: Performed by: NURSE PRACTITIONER

## 2020-11-08 PROCEDURE — 84100 ASSAY OF PHOSPHORUS: CPT

## 2020-11-08 PROCEDURE — 80202 ASSAY OF VANCOMYCIN: CPT

## 2020-11-08 PROCEDURE — 83735 ASSAY OF MAGNESIUM: CPT

## 2020-11-08 PROCEDURE — 99232 SBSQ HOSP IP/OBS MODERATE 35: CPT | Performed by: INTERNAL MEDICINE

## 2020-11-08 RX ORDER — POTASSIUM CHLORIDE 20 MEQ/1
40 TABLET, EXTENDED RELEASE ORAL 2 TIMES DAILY WITH MEALS
Status: COMPLETED | OUTPATIENT
Start: 2020-11-08 | End: 2020-11-08

## 2020-11-08 RX ORDER — ALUMINUM HYDROXIDE 320 MG/5ML
320 LIQUID ORAL
Status: DISCONTINUED | OUTPATIENT
Start: 2020-11-08 | End: 2020-11-11 | Stop reason: HOSPADM

## 2020-11-08 RX ORDER — MEGESTROL ACETATE 40 MG/ML
200 SUSPENSION ORAL DAILY
Status: DISCONTINUED | OUTPATIENT
Start: 2020-11-08 | End: 2020-11-11 | Stop reason: HOSPADM

## 2020-11-08 RX ADMIN — ACETAMINOPHEN 650 MG: 325 TABLET, FILM COATED ORAL at 09:03

## 2020-11-08 RX ADMIN — POTASSIUM CHLORIDE 40 MEQ: 1500 TABLET, EXTENDED RELEASE ORAL at 16:57

## 2020-11-08 RX ADMIN — DEXTROSE MONOHYDRATE 12.5 G: 500 INJECTION PARENTERAL at 03:31

## 2020-11-08 RX ADMIN — OXYCODONE AND ACETAMINOPHEN 1 TABLET: 5; 325 TABLET ORAL at 09:03

## 2020-11-08 RX ADMIN — METOLAZONE 5 MG: 5 TABLET ORAL at 09:03

## 2020-11-08 RX ADMIN — HEPARIN SODIUM 5000 UNITS: 10000 INJECTION INTRAVENOUS; SUBCUTANEOUS at 22:05

## 2020-11-08 RX ADMIN — SODIUM CHLORIDE, PRESERVATIVE FREE 100 UNITS: 5 INJECTION INTRAVENOUS at 20:03

## 2020-11-08 RX ADMIN — GENTAMICIN SULFATE: 1 CREAM TOPICAL at 09:08

## 2020-11-08 RX ADMIN — OXYCODONE AND ACETAMINOPHEN 1 TABLET: 5; 325 TABLET ORAL at 20:10

## 2020-11-08 RX ADMIN — POTASSIUM CHLORIDE 40 MEQ: 1500 TABLET, EXTENDED RELEASE ORAL at 09:03

## 2020-11-08 RX ADMIN — Medication 15 G: at 03:20

## 2020-11-08 RX ADMIN — DILTIAZEM HYDROCHLORIDE 240 MG: 240 CAPSULE, EXTENDED RELEASE ORAL at 09:03

## 2020-11-08 RX ADMIN — HYDRALAZINE HYDROCHLORIDE 25 MG: 25 TABLET, FILM COATED ORAL at 09:03

## 2020-11-08 RX ADMIN — HEPARIN SODIUM 5000 UNITS: 10000 INJECTION INTRAVENOUS; SUBCUTANEOUS at 16:58

## 2020-11-08 RX ADMIN — INSULIN LISPRO 1 UNITS: 100 INJECTION, SOLUTION INTRAVENOUS; SUBCUTANEOUS at 16:58

## 2020-11-08 RX ADMIN — MEGESTROL ACETATE 200 MG: 40 SUSPENSION ORAL at 16:56

## 2020-11-08 RX ADMIN — BUMETANIDE 2 MG: 1 TABLET ORAL at 09:03

## 2020-11-08 RX ADMIN — INSULIN LISPRO 8 UNITS: 100 INJECTION, SOLUTION INTRAVENOUS; SUBCUTANEOUS at 00:04

## 2020-11-08 RX ADMIN — VANCOMYCIN HYDROCHLORIDE 1000 MG: 1 INJECTION, POWDER, LYOPHILIZED, FOR SOLUTION INTRAVENOUS at 16:57

## 2020-11-08 RX ADMIN — DEXTROSE AND SODIUM CHLORIDE: 5; 450 INJECTION, SOLUTION INTRAVENOUS at 08:25

## 2020-11-08 RX ADMIN — ALUMINUM HYDROXIDE 320 MG: 320 LIQUID ORAL at 16:56

## 2020-11-08 RX ADMIN — FAMOTIDINE 20 MG: 20 TABLET ORAL at 09:03

## 2020-11-08 RX ADMIN — HEPARIN SODIUM 5000 UNITS: 10000 INJECTION INTRAVENOUS; SUBCUTANEOUS at 05:28

## 2020-11-08 RX ADMIN — SEVELAMER CARBONATE 800 MG: 800 TABLET, FILM COATED ORAL at 09:03

## 2020-11-08 RX ADMIN — Medication 10 ML: at 20:03

## 2020-11-08 RX ADMIN — BUMETANIDE 2 MG: 1 TABLET ORAL at 20:03

## 2020-11-08 RX ADMIN — STANDARDIZED SENNA CONCENTRATE 8.6 MG: 8.6 TABLET ORAL at 20:03

## 2020-11-08 RX ADMIN — HYDRALAZINE HYDROCHLORIDE 25 MG: 25 TABLET, FILM COATED ORAL at 22:05

## 2020-11-08 RX ADMIN — HYDRALAZINE HYDROCHLORIDE 25 MG: 25 TABLET, FILM COATED ORAL at 16:57

## 2020-11-08 RX ADMIN — LEVOTHYROXINE SODIUM 75 MCG: 0.07 TABLET ORAL at 05:28

## 2020-11-08 ASSESSMENT — PAIN SCALES - GENERAL
PAINLEVEL_OUTOF10: 0
PAINLEVEL_OUTOF10: 7
PAINLEVEL_OUTOF10: 8
PAINLEVEL_OUTOF10: 0

## 2020-11-08 ASSESSMENT — PAIN SCALES - WONG BAKER
WONGBAKER_NUMERICALRESPONSE: 0

## 2020-11-08 ASSESSMENT — PAIN DESCRIPTION - PROGRESSION
CLINICAL_PROGRESSION: NOT CHANGED

## 2020-11-08 ASSESSMENT — PAIN DESCRIPTION - PAIN TYPE: TYPE: ACUTE PAIN

## 2020-11-08 ASSESSMENT — PAIN DESCRIPTION - DESCRIPTORS: DESCRIPTORS: ACHING;CONSTANT;DISCOMFORT

## 2020-11-08 ASSESSMENT — PAIN DESCRIPTION - LOCATION: LOCATION: GROIN

## 2020-11-08 NOTE — PLAN OF CARE
Updated HPI    Caity Martinez is a 71-year-old female with past medical history of ESRD on PD, type I DM, hypertension, hypothyroidism, recent history of C. difficile infection, illicit drug abuse, who was admitted for concerns of chest pain. She developed staph epidermidis bacteremia 10/8 and was seen at [facility]. YULISSA showed large irregular 2 cm x 1 cm mobile mass attached to the right atrial side of the interatrial septum near SVC opening suggestive of vegetation or thrombosis. Large 1 cm x 1 cm irregular echogenic mass attached to the tip of the central venous catheter suggestive of vegetation or thrombosis. She was transferred to [facility] and subsequently left his [facility] AMA. Repeat YULISSA during this admission revealed a right atrial mobile mass approximately 2.4 cm x 0.4 cm attached to the right atrium septum at the junction of the SVC and RA, no evidence of valvular vegetation. Cardiology, ID, CTS were consulted. IV vancomycin was started. She underwent angiovac vegectomy on 11/6. Surgical tissue culture no growth, anaerobic culture negative. Follow blood culture and fungal culture. Lisinopril was discontinued by CTS prior to surgery. CTS okay to resume lisinopril before discharge. Her blood glucose is labile. Had multiple hypoglycemic episodes and multiple DKA in the past.  Currently on low-dose SS and no Lantus. Blood glucose level is monitored POCT every 4 hours. Currently:   LDSS Q4h for glucose control. POCT glucose Q4h   CPPD per nephro  Bumex, metolazone, cardizem per nephro  Hydralazine for HTN.  Resume lisinopril post-op per CTS  Daily labs
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
## 2020-11-08 NOTE — PROGRESS NOTES
Phyllis Ji [facility]  Internal Medicine Residency / [address]    Attending Physician Statement, covering attending  I have discussed the case, including pertinent history and exam findings with the resident and the team.  I have seen and examined the patient, reviewed meds and pertinent labs and the key elements of the encounter have been performed by me. I agree with the assessment, plan and orders as documented by the resident. Patient with hypokalemia, med team to replace. Patient had significant hypoglycemia episode. This is comlicated siituation since she has been refusing to eat the hospital food and has concurrently been on D51/2 NS drip. Would also check HBA1c since last checked 7/20 (8.6%). Remainder of medical problems as per resident note.       Juany Waters  Internal Medicine Residency Faculty

## 2020-11-08 NOTE — FLOWSHEET NOTE
11/08/20 0715   Vitals   BP (!) 164/82   Temp 97.8 °F (36.6 °C)   Temp Source Temporal   Pulse 88   Resp 16   Weight 143 lb 15.4 oz (65.3 kg)   Peritoneal Dialysis Catheter Left lower abdomen   Placement Date/Time: 10/31/20 0704   Catheter Location: Left lower abdomen   Status Deaccessed   Site Condition No Complications   Dressing Status Clean;Dry; Intact   Dressing Occlusive   Cycler   Verification of Prescription CCPD   Total Volume Programmed 52497 mL   Therapy Time (Hours:Minutes) 9   Fill Volume 2000 mL   Last Fill Volume 2000 mL   Dextrose Setting Different (Extraneal)   Number of Cycles 4   Bag Volume 5000 mL   Number of Bags Used 3   Dianeal Solution Other (Comment)  (dextrose 1.5% 8000, dextrose 2.5% 2000)   Ultrafiltration (UF) (mL) 1005 mL   Lost Dwell Time (Hours:Minutes) +40   CCPD completed using strict aseptic technique, dressing is clean, dry, intact, effluent is pale yellow with no fibrin present, pt tolerated procedure well

## 2020-11-08 NOTE — FLOWSHEET NOTE
11/08/20 1848   Vitals   BP (!) 175/93   Temp 98.9 °F (37.2 °C)   Temp Source Oral   Pulse 102   Resp 18   Weight 145 lb 1 oz (65.8 kg)   Peritoneal Dialysis Catheter Left lower abdomen   Placement Date/Time: 10/31/20 0704   Catheter Location: Left lower abdomen   Status Accessed   Site Condition No Complications   Dressing Status Clean;Dry; Intact   Dressing Occlusive   Cycler   Verification of Prescription CCPD   Total Volume Programmed 64283 mL   Therapy Time (Hours:Minutes) 9   Fill Volume 2000 mL   Last Fill Volume 2000 mL   Dextrose Setting Different (Extraneal)   Number of Cycles 4   Bag Volume 5000 mL   Number of Bags Used 3   Dianeal Solution Other (Comment)  (2-1.5% DEXTROSE 5000 , 1-2.5% DEXTROSE 5000)   CCPD initiated using strict aseptic technique, old dressing removed, site cleansed, no redness or drainage noted, effluent is pale yellow with no fibrin present

## 2020-11-08 NOTE — PLAN OF CARE
Problem: Pain:  Goal: Pain level will decrease  Description: Pain level will decrease  11/8/2020 0135 by Stu Bryan RN  Outcome: Ongoing     Problem: Skin Integrity:  Goal: Will show no infection signs and symptoms  Description: Will show no infection signs and symptoms  11/8/2020 0135 by Stu Bryan RN  Outcome: Met This Shift     Problem: Skin Integrity:  Goal: Absence of new skin breakdown  Description: Absence of new skin breakdown  11/8/2020 0135 by Stu Byran RN  Outcome: Met This Shift

## 2020-11-08 NOTE — PROGRESS NOTES
Dr. Mendoza Horse notified of pt. Low blood sugar <40. I did a lab draw and gave her glutose gel, two orange juices and ten minutes later when she stated she did not feel any better and thought she was going to pass out I gave her a half an amp of dextrose. 15 minutes later rechecked and it was 109.      Vitaly Baca RN

## 2020-11-08 NOTE — PROGRESS NOTES
[facility]  Internal Medicine Residency Program  Progress Note - House Team 1    Patient:  Andi Schwartz 31 y.o. female MRN: [id number]     Date of Service: 11/8/2020     CC: Extremity fatigue and chest discomfort  Overnight events: Blood glucose 315 at midnight. 8 unit lispro given and blood glucose down to 40. Gave glucose gel, 2 orange juices, and a half amp of dextrose. Repeat blood glucose 109. Subjective     Patient seen and examined at bedside this AM.    Continue to endorse pain at angioVac site. Denies fever, chills, chest pain, shortness of breath, palpitation, headache, lightheadedness, or abdominal pain. Objective     Physical Exam:  · Vitals: BP (!) 164/82   Pulse 88   Temp 97.8 °F (36.6 °C) (Temporal)   Resp 16   Ht 5' 4\" (1.626 m)   Wt 143 lb 15.4 oz (65.3 kg)   LMP 07/01/2020   SpO2 97%   BMI 24.71 kg/m²     I & O - 24hr: I/O this shift:  In: -   · Out: 1005    · General Appearance: alert, appears stated age, cooperative and fatigued  · HEENT:  Head: Normal, normocephalic, atraumatic. · Eye: PERRL, EMOI  · Neck: no adenopathy, supple, symmetrical, trachea midline and thyroid not enlarged, symmetric, no tenderness/mass/nodules  · Lung: clear to auscultation bilaterally and No respiratory distress  · Heart: normal rate, regular rhythm, S1 and S2 normal, no murmurs, rubs or gallops  · Abdomen: soft, non-tender; bowel sounds normal; no masses,  no organomegaly and HD catheter in place  · Extremities:  extremities normal, atraumatic, no cyanosis or edema. Incision sites on left and right groid covered with dressing. · Musculokeletal: No joint swelling, no muscle tenderness. ROM normal in all joints of extremities.    · Neurologic: Mental status: Alert, oriented, thought content appropriate  Subject  Pertinent Labs & Imaging Studies   jorge alberto  CBC with Differential:    Lab Results   Component Value Date    WBC 11.7 11/08/2020    RBC 3.27 11/08/2020    HGB 9.7 11/08/2020    HCT 30.7 11/08/2020     11/08/2020    MCV 93.9 11/08/2020    MCH 29.7 11/08/2020    MCHC 31.6 11/08/2020    RDW 15.8 11/08/2020    NRBC 0.9 08/10/2020    SEGSPCT 67 06/27/2013    METASPCT 1.7 08/10/2020    LYMPHOPCT 28.9 11/08/2020    MONOPCT 7.9 11/08/2020    BASOPCT 0.9 11/08/2020    MONOSABS 0.92 11/08/2020    LYMPHSABS 3.37 11/08/2020    EOSABS 0.11 11/08/2020    BASOSABS 0.11 11/08/2020     CMP:    Lab Results   Component Value Date     11/08/2020    K 2.9 11/08/2020    K 3.7 10/11/2020     11/08/2020    CO2 22 11/08/2020    BUN 32 11/08/2020    CREATININE 7.9 11/08/2020    GFRAA 7 11/08/2020    LABGLOM 7 11/08/2020    GLUCOSE 20 11/08/2020    GLUCOSE 130 05/18/2012    PROT 5.6 11/08/2020    LABALBU 3.8 11/08/2020    LABALBU 4.1 05/18/2012    CALCIUM 9.1 11/08/2020    BILITOT <0.2 11/08/2020    ALKPHOS 115 11/08/2020    AST 39 11/08/2020    ALT <5 11/08/2020       Resident's Assessment and Plan     [facility] Erica is a 31 y.o. female with PMHx of ESRD on PD, T1DM, HTN, hypothyroidism, chronic diarrhea, opioid abuse, who presented for concerns of extremely fatigue and chest discomfort. 1.  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 Large 1 x 1 cm, irregular echogenic mass attached to the tip of the CVC suggestive of vegetation or thrombus. -CXR showing patchy opacities in the bilateral lower lungs which could represent atelectasis versus airspace disease process. -Mediport removed   -Cardiothoracic surgery consulted. Need to repeat YULISSA to assess for vegetation to decide antibiotics vs AngioVac vs sternotomy per thoracic surgeon.    -Cardiology consulted. -ID consulted.   Appreciate Abx recs -vancomycin 1 g IV x1   -YULISSA on 11/2/2020 reveals a right atrial mobile mass approximately 2.4 cm x 0.4 cm which appears to attach to the atrial septum at the junction of the SVC and RA. No evidence of valvular vegetations. LVEF 60 to 65%. Mild TR. Mild MR.   -Follow up with PA pressure   -Follow PFT   -Random vancomycin level 15.7. Continue to monitor.   -Angiovac vegectomy on 11/6   -Surgical tissue Gram stain negative, surgical culture no growth, anaerobic culture negative, blood culture NGTD   -Follow fungal cultures and VS   -Encourage incentive spirometry    2. Type 1 diabetes mellitus, uncontrolled   -On Lantus 12 unit daily, Humalog 5 unit pre-meal at home    -11/3: Blood glucose labile. Resume D5W, encourage p.o. intake, dietary nutrition supplements   -Patient has poor oral intake. Blood glucose labile. Currently on Lantus 8 units daily, and medium dose sliding scale every 4 hours   -POCT glucose every 4 hours   -11/7: blood glucose 294, anion gap 21, bicarb 21. Lactic acid 4.0, beta-hydroxybutyrate 0.11. Start D5W + 1/2 NS and continue to monitor blood glucose   -Given multiple hypoglycemic episodes, hold Lantus and D51/2 NS drip, decrease short acting insulin to LDSS, POCT every 4 hours to monitor blood glucose. -Repeat hemoglobin A1c    3. ESRD 2/2 diabetic neuropathy on PD   -Nephrology consulted, appreciate recs   -Continue CCPD   -Continue bumex, metolazone, lisinopril, cardizem per nephrology   -EPO 5000 units 3 times week    4. Hypertension   -On clonidine 0.1 mg twice daily as needed at home   -Currently on hydralazine 25 mg 3 times daily, lisinopril 20 mg daily, Cardizem 240 mg daily   -Hold lisinopril before procedure per cardiothoracic surgery   -Continue to monitor BP     5. Chronic diarrhea most likely 2/2 diabetic enteropathy, microscopic colitis   -Concern for C.  Diff at [facility], PCR positive but EIA negative   -C. diff negative this admission   -Continue PO vancomycin 250 mg 4 times daily for total of 14 days    6. Hypothyroidism   -TSH 6, free T3 1.3, free T4 0.78   -Increase levothyroxine to 75 mg daily    7. History of substance use    PT/OT evaluation: Not indicated at this time  DVT prophylaxis/ GI prophylaxis: Heparin/Pepcid  Disposition: Continue inpatient management. Discharge planning.     Lucian Torres MD, PGY-1  Attending physician: Dr. Carolyn Page

## 2020-11-08 NOTE — PROGRESS NOTES
Department of Internal Medicine  Infectious Diseases  Progress Note      C/C :   Endocarditis     Pt is awake and alert   Denies fever or chills, has left inguinal soreness on incision site, no diarrhea   Afebrile     Current Facility-Administered Medications   Medication Dose Route Frequency Provider Last Rate Last Dose    potassium chloride (KLOR-CON M) extended release tablet 40 mEq  40 mEq Oral BID  Kirk Mcpherson MD   40 mEq at 11/08/20 0903    vancomycin 1000 mg IVPB in 250 mL D5W addavial  1,000 mg Intravenous Once Val Weston MD        dextrose 5 % and 0.45 % sodium chloride infusion   Intravenous Continuous Kirk Mcpherson  mL/hr at 11/08/20 0825      insulin regular (HUMULIN R;NOVOLIN R) injection 5 Units  5 Units Subcutaneous Once Amy Kemps, APRN - CNP        acetaminophen (TYLENOL) tablet 650 mg  650 mg Oral Q6H Amy Kemps, APRN - CNP   650 mg at 11/08/20 7805    bisacodyl (DULCOLAX) suppository 10 mg  10 mg Rectal Daily PRN Amy Kemps, APRN - CNP        heparin (porcine) injection 5,000 Units  5,000 Units Subcutaneous 3 times per day Amy Kemps, APRN - CNP   5,000 Units at 11/08/20 0528    insulin lispro (HUMALOG) injection vial 0-12 Units  0-12 Units Subcutaneous Q4H Amy Kemps, APRN - CNP   8 Units at 11/08/20 0004    insulin glargine (LANTUS) injection vial 8 Units  8 Units Subcutaneous Nightly Amy Kemps, APRN - CNP   8 Units at 11/07/20 2039    lidocaine 4 % external patch 1 patch  1 patch Transdermal Daily Amy Kemps, APRN - CNP   1 patch at 11/04/20 1321    hydrALAZINE (APRESOLINE) injection 10 mg  10 mg Intravenous Q4H PRN Amy Kemps, APRN - CNP   10 mg at 11/07/20 0530    labetalol (NORMODYNE;TRANDATE) injection 10 mg  10 mg Intravenous Q4H PRN Amy Kemps, APRN - CNP   10 mg at 11/07/20 0336    sevelamer (RENVELA) tablet 800 mg  800 mg Oral TID  Amydaniella Steinbergs, APRN - CNP   800 mg at 11/08/20 0903    senna (SENOKOT) tablet 8.6 mg 1 tablet Oral Nightly Brooklynn Ape, APRN - CNP   8.6 mg at 11/07/20 2026    levothyroxine (SYNTHROID) tablet 75 mcg  75 mcg Oral QAM AC Brooklynn Ape, APRN - CNP   75 mcg at 11/08/20 0528    dilTIAZem (CARDIZEM CD) extended release capsule 240 mg  240 mg Oral Daily [place] Ape, APRN - CNP   240 mg at 11/08/20 4554    bumetanide (BUMEX) tablet 2 mg  2 mg Oral BID Brooklynn Ape, APRN - CNP   2 mg at 11/08/20 8610    metOLazone (ZAROXOLYN) tablet 5 mg  5 mg Oral Daily [place] Ape, APRN - CNP   5 mg at 11/08/20 9589    oxyCODONE-acetaminophen (PERCOCET) 5-325 MG per tablet 0.5 tablet  0.5 tablet Oral Q6H PRN Brooklynn Ape, APRN - CNP        Or    oxyCODONE-acetaminophen (PERCOCET) 5-325 MG per tablet 1 tablet  1 tablet Oral Q6H PRN Brooklynn Ape, APRN - CNP   1 tablet at 11/08/20 8937    hydrALAZINE (APRESOLINE) tablet 25 mg  25 mg Oral 3 times per day [place] Ape, APRN - CNP   25 mg at 11/08/20 0903    lidocaine 1 % injection 5 mL  5 mL Intradermal Once [place] Ape, APRN - CNP        sodium chloride flush 0.9 % injection 10 mL  10 mL Intravenous PRN [place] Ape, APRN - CNP   10 mL at 11/07/20 1206    heparin flush 100 UNIT/ML injection 100 Units  1 mL Intravenous 2 times per day Brooklynn Ape, APRN - CNP   Stopped at 11/07/20 0919    heparin flush 100 UNIT/ML injection 100 Units  1 mL Intracatheter PRN [place] Ape, APRN - CNP   100 Units at 11/07/20 2026    vancomycin (VANCOCIN) oral solution 250 mg  250 mg Oral 4 times per day Brooklynn Ape, APRN - CNP   250 mg at 11/08/20 0529    calcium gluconate 10 % injection 1 g  1 g Intravenous Once Brooklynn Ape, APRN - CNP        sodium chloride flush 0.9 % injection 10 mL  10 mL Intravenous 2 times per day Brooklynn Ape, APRN - CNP   10 mL at 11/07/20 0915    sodium chloride flush 0.9 % injection 10 mL  10 mL Intravenous PRN HEBER Cordoba - CNP   10 mL at 11/05/20 0029    acetaminophen (TYLENOL) tablet 650 mg  650 mg Oral Q6H PRN [facility] Sicks, APRN - CNP   650 mg at 11/04/20 2331    Or    acetaminophen (TYLENOL) suppository 650 mg  650 mg Rectal Q6H PRN [facility], APR[facility] - CNP        polyethylene glycol (GLYCOLAX) packet 17 g  17 g Oral Daily PRN [facility] Sicks, APRN - CNP        promethazine (PHENERGAN) tablet 12.5 mg  12.5 mg Oral Q6H PRN [facility] Sicks, APRN - CNP        Or    ondansetron (ZOFRAN) injection 4 mg  4 mg Intravenous Q6H PRN [facility] Sicks, APRN - CNP   4 mg at 11/05/20 1703    glucose (GLUTOSE) 40 % oral gel 15 g  15 g Oral PRN [facility], APRN - CNP   15 g at 11/08/20 0320    dextrose 50 % IV solution  12.5 g Intravenous PRN [facility], APRN - CNP   12.5 g at 11/08/20 0331    glucagon (rDNA) injection 1 mg  1 mg Intramuscular PRN [facility], APRN - CNP        dextrose 5 % solution  100 mL/hr Intravenous PRN [facility], APRN - CNP   Stopped at 11/04/20 4642    gentamicin (GARAMYCIN) 0.1 % cream   Topical Daily [facility], APRN - CNP        epoetin nena-epbx (RETACRIT) injection 5,000 Units  5,000 Units Subcutaneous Once per day on Mon Wed Fri [facility], APRN - CNP   5,000 Units at 11/06/20 1897    famotidine (PEPCID) tablet 20 mg  20 mg Oral Daily [facility], APRN - CNP   20 mg at 11/08/20 7287    LORazepam (ATIVAN) tablet 0.5 mg  0.5 mg Oral BID PRN [facility], APRN - CNP   0.5 mg at 11/07/20 0336       PHYSICAL EXAM:      Vitals:     BP (!) 164/82   Pulse 88   Temp 97.8 °F (36.6 °C) (Temporal)   Resp 16   Ht 5' 4\" (1.626 m)   Wt 143 lb 15.4 oz (65.3 kg)   LMP 07/01/2020   SpO2 97%   BMI 24.71 kg/m²     General Appearance:    Awakens to voice, no distress    Lungs:     Clear to auscultation bilaterally,    Heart:    Tachycardic, systolic murmur +   Abdomen:     Soft, non-tender, bowel sounds present, PD catheter in place    Extremities:   No edema, no cyanosis   Pulses:   Dorsalis pedis palpable    Skin:   no rashes or lesions     CBC with Differential: Lab Results   Component Value Date    WBC 11.7 11/08/2020    RBC 3.27 11/08/2020    HGB 9.7 11/08/2020    HCT 30.7 11/08/2020     11/08/2020    MCV 93.9 11/08/2020    MCH 29.7 11/08/2020    MCHC 31.6 11/08/2020    RDW 15.8 11/08/2020    NRBC 0.9 08/10/2020    SEGSPCT 67 06/27/2013    METASPCT 1.7 08/10/2020    LYMPHOPCT 28.9 11/08/2020    MONOPCT 7.9 11/08/2020    BASOPCT 0.9 11/08/2020    MONOSABS 0.92 11/08/2020    LYMPHSABS 3.37 11/08/2020    EOSABS 0.11 11/08/2020    BASOSABS 0.11 11/08/2020       CMP     Lab Results   Component Value Date     11/08/2020    K 2.9 11/08/2020    K 3.7 10/11/2020     11/08/2020    CO2 22 11/08/2020    BUN 32 11/08/2020    CREATININE 7.9 11/08/2020    GFRAA 7 11/08/2020    LABGLOM 7 11/08/2020    GLUCOSE 20 11/08/2020    GLUCOSE 130 05/18/2012    PROT 5.6 11/08/2020    LABALBU 3.8 11/08/2020    LABALBU 4.1 05/18/2012    CALCIUM 9.1 11/08/2020    BILITOT <0.2 11/08/2020    ALKPHOS 115 11/08/2020    AST 39 11/08/2020    ALT <5 11/08/2020         Hepatic Function Panel:    Lab Results   Component Value Date    ALKPHOS 115 11/08/2020    ALT <5 11/08/2020    AST 39 11/08/2020    PROT 5.6 11/08/2020    BILITOT <0.2 11/08/2020    BILIDIR <0.2 10/11/2020    IBILI see below 10/11/2020    LABALBU 3.8 11/08/2020    LABALBU 4.1 05/18/2012       PT/INR:    Lab Results   Component Value Date    PROTIME 10.6 10/31/2020    PROTIME 13.6 08/14/2011    INR 1.0 10/31/2020       TSH:    Lab Results   Component Value Date    TSH 6.000 10/31/2020       U/A:    Lab Results   Component Value Date    COLORU Yellow 11/01/2020    PHUR 8.0 11/01/2020    LABCAST RARE 01/12/2020    WBCUA NONE 11/01/2020    WBCUA 0-1 05/18/2012    RBCUA 2-5 11/01/2020    RBCUA 1-3 08/01/2013    MUCUS Present 02/12/2016    TRICHOMONAS Present 07/17/2020    YEAST RARE 05/24/2018    BACTERIA FEW 11/01/2020    CLARITYU Clear 11/01/2020    SPECGRAV 1.020 11/01/2020    LEUKOCYTESUR Negative 11/01/2020

## 2020-11-09 LAB
ALBUMIN SERPL-MCNC: 3.7 G/DL (ref 3.5–5.2)
ALP BLD-CCNC: 142 U/L (ref 35–104)
ALT SERPL-CCNC: <5 U/L (ref 0–32)
ANION GAP SERPL CALCULATED.3IONS-SCNC: 17 MMOL/L (ref 7–16)
ANION GAP SERPL CALCULATED.3IONS-SCNC: 17 MMOL/L (ref 7–16)
AST SERPL-CCNC: 12 U/L (ref 0–31)
BASOPHILS ABSOLUTE: 0.07 E9/L (ref 0–0.2)
BASOPHILS RELATIVE PERCENT: 0.9 % (ref 0–2)
BILIRUB SERPL-MCNC: <0.2 MG/DL (ref 0–1.2)
BUN BLDV-MCNC: 37 MG/DL (ref 6–20)
BUN BLDV-MCNC: 39 MG/DL (ref 6–20)
CALCIUM SERPL-MCNC: 8.5 MG/DL (ref 8.6–10.2)
CALCIUM SERPL-MCNC: 8.8 MG/DL (ref 8.6–10.2)
CHLORIDE BLD-SCNC: 103 MMOL/L (ref 98–107)
CHLORIDE BLD-SCNC: 99 MMOL/L (ref 98–107)
CO2: 23 MMOL/L (ref 22–29)
CO2: 23 MMOL/L (ref 22–29)
CREAT SERPL-MCNC: 7.3 MG/DL (ref 0.5–1)
CREAT SERPL-MCNC: 7.4 MG/DL (ref 0.5–1)
EOSINOPHILS ABSOLUTE: 0.32 E9/L (ref 0.05–0.5)
EOSINOPHILS RELATIVE PERCENT: 4.2 % (ref 0–6)
GFR AFRICAN AMERICAN: 8
GFR AFRICAN AMERICAN: 8
GFR NON-AFRICAN AMERICAN: 8 ML/MIN/1.73
GFR NON-AFRICAN AMERICAN: 8 ML/MIN/1.73
GLUCOSE BLD-MCNC: 371 MG/DL (ref 74–99)
GLUCOSE BLD-MCNC: 592 MG/DL (ref 74–99)
GLUCOSE BLD-MCNC: 65 MG/DL (ref 74–99)
HBA1C MFR BLD: 6.2 % (ref 4–5.6)
HCT VFR BLD CALC: 31.5 % (ref 34–48)
HEMOGLOBIN: 9.9 G/DL (ref 11.5–15.5)
IMMATURE GRANULOCYTES #: 0.03 E9/L
IMMATURE GRANULOCYTES %: 0.4 % (ref 0–5)
LYMPHOCYTES ABSOLUTE: 1.41 E9/L (ref 1.5–4)
LYMPHOCYTES RELATIVE PERCENT: 18.6 % (ref 20–42)
MAGNESIUM: 1.9 MG/DL (ref 1.6–2.6)
MCH RBC QN AUTO: 29.6 PG (ref 26–35)
MCHC RBC AUTO-ENTMCNC: 31.4 % (ref 32–34.5)
MCV RBC AUTO: 94.3 FL (ref 80–99.9)
METER GLUCOSE: 109 MG/DL (ref 74–99)
METER GLUCOSE: 124 MG/DL (ref 74–99)
METER GLUCOSE: 260 MG/DL (ref 74–99)
METER GLUCOSE: 389 MG/DL (ref 74–99)
METER GLUCOSE: 403 MG/DL (ref 74–99)
METER GLUCOSE: <40 MG/DL (ref 74–99)
METER GLUCOSE: <40 MG/DL (ref 74–99)
METER GLUCOSE: >500 MG/DL (ref 74–99)
MONOCYTES ABSOLUTE: 0.37 E9/L (ref 0.1–0.95)
MONOCYTES RELATIVE PERCENT: 4.9 % (ref 2–12)
NEUTROPHILS ABSOLUTE: 5.37 E9/L (ref 1.8–7.3)
NEUTROPHILS RELATIVE PERCENT: 71 % (ref 43–80)
PDW BLD-RTO: 15.7 FL (ref 11.5–15)
PHOSPHORUS: 6.8 MG/DL (ref 2.5–4.5)
PLATELET # BLD: 289 E9/L (ref 130–450)
PMV BLD AUTO: 10.2 FL (ref 7–12)
POTASSIUM SERPL-SCNC: 2.9 MMOL/L (ref 3.5–5)
POTASSIUM SERPL-SCNC: 3.5 MMOL/L (ref 3.5–5)
POTASSIUM SERPL-SCNC: 5.3 MMOL/L (ref 3.5–5)
POTASSIUM SERPL-SCNC: 5.7 MMOL/L (ref 3.5–5)
RBC # BLD: 3.34 E12/L (ref 3.5–5.5)
SODIUM BLD-SCNC: 139 MMOL/L (ref 132–146)
SODIUM BLD-SCNC: 143 MMOL/L (ref 132–146)
TOTAL PROTEIN: 5.3 G/DL (ref 6.4–8.3)
VANCOMYCIN RANDOM: 32.4 MCG/ML (ref 5–40)
WBC # BLD: 7.6 E9/L (ref 4.5–11.5)

## 2020-11-09 PROCEDURE — 85025 COMPLETE CBC W/AUTO DIFF WBC: CPT

## 2020-11-09 PROCEDURE — 83735 ASSAY OF MAGNESIUM: CPT

## 2020-11-09 PROCEDURE — 6360000002 HC RX W HCPCS: Performed by: INTERNAL MEDICINE

## 2020-11-09 PROCEDURE — 6370000000 HC RX 637 (ALT 250 FOR IP): Performed by: INTERNAL MEDICINE

## 2020-11-09 PROCEDURE — 80048 BASIC METABOLIC PNL TOTAL CA: CPT

## 2020-11-09 PROCEDURE — 83036 HEMOGLOBIN GLYCOSYLATED A1C: CPT

## 2020-11-09 PROCEDURE — 99231 SBSQ HOSP IP/OBS SF/LOW 25: CPT | Performed by: INTERNAL MEDICINE

## 2020-11-09 PROCEDURE — 2580000003 HC RX 258: Performed by: NURSE PRACTITIONER

## 2020-11-09 PROCEDURE — 99254 IP/OBS CNSLTJ NEW/EST MOD 60: CPT | Performed by: INTERNAL MEDICINE

## 2020-11-09 PROCEDURE — 6370000000 HC RX 637 (ALT 250 FOR IP): Performed by: NURSE PRACTITIONER

## 2020-11-09 PROCEDURE — 80202 ASSAY OF VANCOMYCIN: CPT

## 2020-11-09 PROCEDURE — 80053 COMPREHEN METABOLIC PANEL: CPT

## 2020-11-09 PROCEDURE — 6360000002 HC RX W HCPCS: Performed by: NURSE PRACTITIONER

## 2020-11-09 PROCEDURE — 82947 ASSAY GLUCOSE BLOOD QUANT: CPT

## 2020-11-09 PROCEDURE — 84100 ASSAY OF PHOSPHORUS: CPT

## 2020-11-09 PROCEDURE — 36415 COLL VENOUS BLD VENIPUNCTURE: CPT

## 2020-11-09 PROCEDURE — 90945 DIALYSIS ONE EVALUATION: CPT | Performed by: INTERNAL MEDICINE

## 2020-11-09 PROCEDURE — 2140000000 HC CCU INTERMEDIATE R&B

## 2020-11-09 PROCEDURE — 84132 ASSAY OF SERUM POTASSIUM: CPT

## 2020-11-09 PROCEDURE — 82962 GLUCOSE BLOOD TEST: CPT

## 2020-11-09 PROCEDURE — 36592 COLLECT BLOOD FROM PICC: CPT

## 2020-11-09 PROCEDURE — 90945 DIALYSIS ONE EVALUATION: CPT

## 2020-11-09 RX ORDER — INSULIN GLARGINE 100 [IU]/ML
7 INJECTION, SOLUTION SUBCUTANEOUS NIGHTLY
Status: DISCONTINUED | OUTPATIENT
Start: 2020-11-09 | End: 2020-11-11 | Stop reason: HOSPADM

## 2020-11-09 RX ORDER — INSULIN GLARGINE 100 [IU]/ML
6 INJECTION, SOLUTION SUBCUTANEOUS NIGHTLY
Status: DISCONTINUED | OUTPATIENT
Start: 2020-11-09 | End: 2020-11-09

## 2020-11-09 RX ORDER — FENTANYL CITRATE 50 UG/ML
12.5 INJECTION, SOLUTION INTRAMUSCULAR; INTRAVENOUS PRN
Status: COMPLETED | OUTPATIENT
Start: 2020-11-09 | End: 2020-11-10

## 2020-11-09 RX ORDER — POTASSIUM CHLORIDE 7.45 MG/ML
10 INJECTION INTRAVENOUS
Status: DISCONTINUED | OUTPATIENT
Start: 2020-11-09 | End: 2020-11-09

## 2020-11-09 RX ORDER — POTASSIUM CHLORIDE 29.8 MG/ML
40 INJECTION INTRAVENOUS
Status: DISCONTINUED | OUTPATIENT
Start: 2020-11-09 | End: 2020-11-09

## 2020-11-09 RX ADMIN — Medication 10 ML: at 11:15

## 2020-11-09 RX ADMIN — SODIUM CHLORIDE, PRESERVATIVE FREE 100 UNITS: 5 INJECTION INTRAVENOUS at 21:56

## 2020-11-09 RX ADMIN — HYDRALAZINE HYDROCHLORIDE 25 MG: 25 TABLET, FILM COATED ORAL at 05:46

## 2020-11-09 RX ADMIN — HEPARIN SODIUM 5000 UNITS: 10000 INJECTION INTRAVENOUS; SUBCUTANEOUS at 21:50

## 2020-11-09 RX ADMIN — Medication 10 ML: at 21:50

## 2020-11-09 RX ADMIN — ALUMINUM HYDROXIDE 320 MG: 320 LIQUID ORAL at 12:18

## 2020-11-09 RX ADMIN — DEXTROSE MONOHYDRATE 12.5 G: 500 INJECTION PARENTERAL at 08:14

## 2020-11-09 RX ADMIN — LORAZEPAM 0.5 MG: 0.5 TABLET ORAL at 21:50

## 2020-11-09 RX ADMIN — INSULIN LISPRO 6 UNITS: 100 INJECTION, SOLUTION INTRAVENOUS; SUBCUTANEOUS at 04:49

## 2020-11-09 RX ADMIN — ALUMINUM HYDROXIDE 320 MG: 320 LIQUID ORAL at 17:46

## 2020-11-09 RX ADMIN — METOLAZONE 5 MG: 5 TABLET ORAL at 11:13

## 2020-11-09 RX ADMIN — Medication 15 G: at 07:58

## 2020-11-09 RX ADMIN — DILTIAZEM HYDROCHLORIDE 240 MG: 240 CAPSULE, EXTENDED RELEASE ORAL at 11:13

## 2020-11-09 RX ADMIN — HEPARIN SODIUM 5000 UNITS: 10000 INJECTION INTRAVENOUS; SUBCUTANEOUS at 16:14

## 2020-11-09 RX ADMIN — STANDARDIZED SENNA CONCENTRATE 8.6 MG: 8.6 TABLET ORAL at 21:50

## 2020-11-09 RX ADMIN — OXYCODONE AND ACETAMINOPHEN 1 TABLET: 5; 325 TABLET ORAL at 03:15

## 2020-11-09 RX ADMIN — INSULIN GLARGINE 7 UNITS: 100 INJECTION, SOLUTION SUBCUTANEOUS at 21:41

## 2020-11-09 RX ADMIN — GENTAMICIN SULFATE: 1 CREAM TOPICAL at 11:14

## 2020-11-09 RX ADMIN — OXYCODONE AND ACETAMINOPHEN 1 TABLET: 5; 325 TABLET ORAL at 20:14

## 2020-11-09 RX ADMIN — HYDRALAZINE HYDROCHLORIDE 25 MG: 25 TABLET, FILM COATED ORAL at 21:50

## 2020-11-09 RX ADMIN — FAMOTIDINE 20 MG: 20 TABLET ORAL at 11:13

## 2020-11-09 RX ADMIN — INSULIN LISPRO 6 UNITS: 100 INJECTION, SOLUTION INTRAVENOUS; SUBCUTANEOUS at 01:02

## 2020-11-09 RX ADMIN — MEGESTROL ACETATE 200 MG: 40 SUSPENSION ORAL at 11:13

## 2020-11-09 RX ADMIN — SODIUM CHLORIDE, PRESERVATIVE FREE 300 UNITS: 5 INJECTION INTRAVENOUS at 11:14

## 2020-11-09 RX ADMIN — HYDRALAZINE HYDROCHLORIDE 25 MG: 25 TABLET, FILM COATED ORAL at 16:14

## 2020-11-09 RX ADMIN — Medication 15 G: at 07:57

## 2020-11-09 RX ADMIN — POTASSIUM CHLORIDE 40 MEQ: 400 INJECTION, SOLUTION INTRAVENOUS at 12:24

## 2020-11-09 RX ADMIN — BUMETANIDE 2 MG: 1 TABLET ORAL at 11:13

## 2020-11-09 RX ADMIN — BUMETANIDE 2 MG: 1 TABLET ORAL at 21:50

## 2020-11-09 RX ADMIN — HEPARIN 100 UNITS: 100 SYRINGE at 21:50

## 2020-11-09 RX ADMIN — EPOETIN ALFA-EPBX 5000 UNITS: 10000 INJECTION, SOLUTION INTRAVENOUS; SUBCUTANEOUS at 11:13

## 2020-11-09 RX ADMIN — HEPARIN SODIUM 5000 UNITS: 10000 INJECTION INTRAVENOUS; SUBCUTANEOUS at 05:46

## 2020-11-09 RX ADMIN — INSULIN LISPRO 4 UNITS: 100 INJECTION, SOLUTION INTRAVENOUS; SUBCUTANEOUS at 21:41

## 2020-11-09 RX ADMIN — LEVOTHYROXINE SODIUM 75 MCG: 0.07 TABLET ORAL at 05:46

## 2020-11-09 ASSESSMENT — PAIN DESCRIPTION - FREQUENCY: FREQUENCY: CONTINUOUS

## 2020-11-09 ASSESSMENT — PAIN DESCRIPTION - PROGRESSION
CLINICAL_PROGRESSION: GRADUALLY WORSENING
CLINICAL_PROGRESSION: NOT CHANGED

## 2020-11-09 ASSESSMENT — PAIN SCALES - WONG BAKER
WONGBAKER_NUMERICALRESPONSE: 0

## 2020-11-09 ASSESSMENT — PAIN DESCRIPTION - ONSET: ONSET: ON-GOING

## 2020-11-09 ASSESSMENT — PAIN DESCRIPTION - PAIN TYPE: TYPE: ACUTE PAIN

## 2020-11-09 ASSESSMENT — PAIN SCALES - GENERAL
PAINLEVEL_OUTOF10: 7
PAINLEVEL_OUTOF10: 8
PAINLEVEL_OUTOF10: 8
PAINLEVEL_OUTOF10: 7

## 2020-11-09 ASSESSMENT — PAIN DESCRIPTION - ORIENTATION: ORIENTATION: RIGHT

## 2020-11-09 ASSESSMENT — PAIN DESCRIPTION - LOCATION: LOCATION: GROIN

## 2020-11-09 ASSESSMENT — PAIN - FUNCTIONAL ASSESSMENT: PAIN_FUNCTIONAL_ASSESSMENT: ACTIVITIES ARE NOT PREVENTED

## 2020-11-09 ASSESSMENT — PAIN DESCRIPTION - DESCRIPTORS: DESCRIPTORS: ACHING;CONSTANT;DISCOMFORT

## 2020-11-09 NOTE — PLAN OF CARE
Problem: Pain:  Goal: Pain level will decrease  Description: Pain level will decrease  11/9/2020 0147 by Venkat Lora RN  Outcome: Met This Shift     Problem: Skin Integrity:  Goal: Will show no infection signs and symptoms  Description: Will show no infection signs and symptoms  11/9/2020 0147 by Venkat Lora RN  Outcome: Met This Shift     Problem: Falls - Risk of:  Goal: Will remain free from falls  Description: Will remain free from falls  11/9/2020 0147 by Venkat Lora RN  Outcome: Met This Shift

## 2020-11-09 NOTE — PROGRESS NOTES
Comprehensive Nutrition Assessment    Type and Reason for Visit:  Reassess    Nutrition Recommendations/Plan: Recommend to continue Nepro renal supplement TID (per physician orders) to help meet increased nutritional needs from dialysis and d/t decreased po intake of meals. Nutrition Assessment:  Patient remains at nutritional risk d/t decreased po intake of meals x 9 days since admission (25-50%) ; noted ESRD w/ PD ; hx of substance abuse ; noted endocarditis ; s/p port removal ; s/p angiovac and YULISSA ; noted C-Diff ; will continue ONS    Malnutrition Assessment:  Malnutrition Status: At risk for malnutrition (Comment)    Context:  Acute Illness     Findings of the 6 clinical characteristics of malnutrition:  Energy Intake:  1 - 75% or less of estimated energy requirements for 7 or more days  Weight Loss:  Unable to assess(d/t fluid shifts ; on PD)     Body Fat Loss:  Unable to assess(data not available to assess at this time)     Muscle Mass Loss:  Unable to assess(data not available to assess at this time)    Fluid Accumulation:  No significant fluid accumulation     Strength:  Not Performed    Estimated Daily Nutrient Needs:  Energy (kcal):  8954-4232 (REE 1342 x 1.3 SF); Weight Used for Energy Requirements:  Current     Protein (g):  85-95 (1.3-1.5g/kg CBW); Weight Used for Protein Requirements:  Current        Fluid (ml/day):  per renal management; Method Used for Fluid Requirements:  1 ml/kcal      Nutrition Related Findings:  -I&Os (-3.9 L), trace edema, active BS, rounded abd, chronic loose stools, A&O x 4, puncture site      Wounds:  None       Current Nutrition Therapies:    Dietary Nutrition Supplements: Renal Oral Supplement  DIET GENERAL;     Anthropometric Measures:  · Height: 5' 4\" (162.6 cm)  · Current Body Weight: 138 lb (62.6 kg)(11/9/20, standing scale)   · Admission Body Weight: 147 lb (66.7 kg)(11/1 standing scale)    · Usual Body Weight: (UTO ; EMR shows past weights ranging between 129-164# within the past year)     · Ideal Body Weight: 120 lbs; % Ideal Body Weight 115 %   · BMI: 23.7  · BMI Categories: Normal Weight (BMI 18.5-24. 9)       Nutrition Diagnosis:   · Inadequate oral intake related to altered GI function(hx of chronic loose stools) as evidenced by poor intake prior to admission, intake 26-50%, diarrhea      Nutrition Interventions:   Food and/or Nutrient Delivery:  Continue Current Diet, Continue Oral Nutrition Supplement  Nutrition Education/Counseling:  Education not indicated   Coordination of Nutrition Care:  Continue to monitor while inpatient    Goals:  Patient will consume 50-75% of meals served       Nutrition Monitoring and Evaluation:   Behavioral-Environmental Outcomes:  Beliefs and Attitutes   Food/Nutrient Intake Outcomes:  Food and Nutrient Intake, Supplement Intake  Physical Signs/Symptoms Outcomes:  Biochemical Data, Chewing or Swallowing, GI Status, Diarrhea, Fluid Status or Edema, Hemodynamic Status, Meal Time Behavior, Nutrition Focused Physical Findings, Skin, Weight     Discharge Planning:    Continue current diet, Continue Oral Nutrition Supplement     Electronically signed by Kirsten Houser RD, LD on 11/9/20 at 11:02 AM EST    Contact: [phone]

## 2020-11-09 NOTE — PROGRESS NOTES
LYMPHSABS 1.41 11/09/2020    EOSABS 0.32 11/09/2020    BASOSABS 0.07 11/09/2020     CMP:    Lab Results   Component Value Date     11/09/2020    K 5.3 11/09/2020    K 3.7 10/11/2020     11/09/2020    CO2 23 11/09/2020    BUN 39 11/09/2020    CREATININE 7.4 11/09/2020    GFRAA 8 11/09/2020    LABGLOM 8 11/09/2020    GLUCOSE 65 11/09/2020    GLUCOSE 130 05/18/2012    PROT 5.3 11/09/2020    LABALBU 3.7 11/09/2020    LABALBU 4.1 05/18/2012    CALCIUM 8.8 11/09/2020    BILITOT <0.2 11/09/2020    ALKPHOS 142 11/09/2020    AST 12 11/09/2020    ALT <5 11/09/2020       Resident's Assessment and Plan     [address] Maldonado is a 31 y.o. female    1. 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  - Blood culture positive for staph epidermidis on 10/8  - YULISSA on 10/19 showed large irregular 2 x 1 cm mobile echogenic mass attached to the right atrium suggestive of vegetation or thrombus; large 1 x 1 cm irregular echogenic mass attached to the tip of the CVC suggestive of vegetation or thrombus  - YULISSA on 11/2 showed right atrial mobile mass 2.4 cm x 0.4 cm which appears attached to atrial septum at the junction of the SVA and RA; LVEF 60-65%  - Infected Mediport was removed  - s/p angiovac vegectomy 11/6  - Cardiology, ID, CTS following, appreciate recommendations  - Surgical tissue Gram stain negative, surgical culture no growth, anaerobic culture negative, blood culture negative to date  - Received vancomycin as needed per ID on 11/8  - Follow vanc troph today  - Follow fungal cultures    2. Type 1 diabetes mellitus, uncontrolled  - BG very labile 92 403  - Endocrinology consulted; start Lantus 6 units nightly, premeal Humalog 2 units 3 times daily, modified sliding scale  - Continue to check blood glucose with meals and at bedtime  - BG goal 140 to 180 mg/dl, per Endocrinology    3.   ESRD on PD  - Nephrology following, appreciate recommendations  - Continue CCPD  - Continue Bumex, metolazone, Cardizem per nephrology  - Continue EPO 5000 units 3 times weekly    5. Recent C. difficile infection  - C. difficile positive at [facility] prior to transfer to [facility]  - ID following, appreciate recommendations  - Continue oral vancomycin for total of 14 days (currently day 9)    4. History of hypertension  - BP today 145-148/78-84  - Continue hydralazine and Cardizem  - Can resume lisinopril at discharge per CTS  - Continue to monitor BP    6. History of hypothyroidism  - TSH 6, free T3 1.3, free T4 0.78  - Continue levothyroxine 75 mg daily    7.   History of substance abuse      Cynthia Song MD, PGY-1  Attending physician: Dr. Joshua Matthews

## 2020-11-09 NOTE — FLOWSHEET NOTE
[facility] complete, pt disconnected from cycler using aspectic technique. Did not want lines taped to her.  Tolerated well, uf 1008ml clear yellow fluid
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
## 2020-11-09 NOTE — PROGRESS NOTES
Attempted to visit patient in consultation for uncontrolled type 1 DM, but she is extremely sleepy and asks that I come back tomorrow.  -Wero Barrett, RN, BSN, [facility]

## 2020-11-09 NOTE — PROGRESS NOTES
[facility]  Internal Medicine Residency / [address]    Attending Physician Statement  I have discussed the case, including pertinent history and exam findings with the resident and the team.  I have seen and examined the patient and the key elements of the encounter have been performed by me. I agree with the assessment, plan and orders as documented by the resident. Calm withdrawn mood this AM  Treating staph epi endocarditis right sided with Vanco  On chronic HD and infected Tessio removed   VS stable  Positive praying hands sign   DM 1 with very labile BS   To consult Endocrine   Follow for now  Remainder of medical problems as per resident note.       Berkley Negrete MD FRCP([facility])  Internal Medicine Residency Faculty

## 2020-11-09 NOTE — PROGRESS NOTES
[facility]  Internal Medicine Residency Program  Progress Note  - House Team 1    Patient:  Nyla Ryder 31 y.o. female MRN: [id number]     Date of Service: 11/9/2020     CC: fatigue and chest discomfort  Overnight events: N/A    Subjective       Patient seen and examined at bedside this AM, blood pressure 148/78, vitals otherwise stable. She currently endorses unrelenting 8/10 sharp pain at angiovac site that has caused poor sleep. She also endorses several episodes of hypoglycemia causing fatigue despite increased PO intake. She denies any subjective fevers, chills, chest pain, cough, dyspnea, abdominal pain, or N/V/D. Objective     Physical Exam:  · Vitals: BP (!) 148/78   Pulse 101   Temp 98.2 °F (36.8 °C) (Temporal)   Resp 16   Ht 5' 4\" (1.626 m)   Wt 138 lb 9.6 oz (62.9 kg)   LMP 07/01/2020   SpO2 99%   BMI 23.79 kg/m²     · I & O - 24hr: No intake/output data recorded. · General Appearance: alert, appears stated age and cooperative  · HEENT:  Head: Normocephalic, no lesions, without obvious abnormality. · Neck: no adenopathy, supple, symmetrical, trachea midline and thyroid not enlarged, symmetric, no tenderness/mass/nodules  · Lung: clear to auscultation bilaterally  · Heart: regular rate and rhythm  · Abdomen: soft, non-tender; bowel sounds normal; no masses,  no organomegaly  · Extremities:  extremities normal, atraumatic, no cyanosis or edema; peripheral pulses full, peripheral sensation intact. · Musculokeletal: No joint swelling, no muscle tenderness. ROM normal in all joints of extremities.    · Neurologic: Mental status: Alert, oriented, thought content appropriate    Pertinent Labs & Imaging Studies     CBC with Differential:    Lab Results   Component Value Date    WBC 7.6 11/09/2020    RBC 3.34 11/09/2020    HGB 9.9 11/09/2020    HCT 31.5 11/09/2020     11/09/2020    MCV 94.3 11/09/2020    MCH 29.6 11/09/2020    MCHC 31.4 11/09/2020    RDW 15.7 11/09/2020    NRBC 0.9 08/10/2020    SEGSPCT 67 06/27/2013    METASPCT 1.7 08/10/2020    LYMPHOPCT 18.6 11/09/2020    MONOPCT 4.9 11/09/2020    BASOPCT 0.9 11/09/2020    MONOSABS 0.37 11/09/2020    LYMPHSABS 1.41 11/09/2020    EOSABS 0.32 11/09/2020    BASOSABS 0.07 11/09/2020     CMP:    Lab Results   Component Value Date     11/09/2020    K 2.9 11/09/2020    K 3.7 10/11/2020     11/09/2020    CO2 23 11/09/2020    BUN 39 11/09/2020    CREATININE 7.4 11/09/2020    GFRAA 8 11/09/2020    LABGLOM 8 11/09/2020    GLUCOSE 65 11/09/2020    GLUCOSE 130 05/18/2012    PROT 5.3 11/09/2020    LABALBU 3.7 11/09/2020    LABALBU 4.1 05/18/2012    CALCIUM 8.8 11/09/2020    BILITOT <0.2 11/09/2020    ALKPHOS 142 11/09/2020    AST 12 11/09/2020    ALT <5 11/09/2020     Magnesium:    Lab Results   Component Value Date    MG 1.9 11/09/2020     Phosphorus:    Lab Results   Component Value Date    PHOS 6.8 11/09/2020       Student's Assessment and Plan     [address] [facility] is a 31 y.o. female w/ PMHx of acute congestive heart failure (YULISSA 11/2/20 visually estimates EF 60-65%), ESRD on peritoneal hemodialysis, poorly controller type 1 diabetes mellitus, diabetic ketoacidosis, hypothyroidism, HTN, and HLD presented to the ED with CC of fatigue and chest pain. 1. Endocarditis - Resolving              -YULISSA (10/19/20) demonstrated a large irregular, 2cm x 1cm, mobile, echogenic mass attached to the right atrial side of interatrial septum near SVC opening suggestive of vegetation or thrombus and a large, 1cm x 1cm, irregular echogenic mass attached to the tip of the central venous catheter suggestive of vegetation or thrombus.             -Repeat YULISSA (11/2/20) demonstrated right atrial mobile mass approximately 2.4 cm x 0.4 cm, which may represent a vegetation or mobile thrombus. It attaches to the atrial septum superior to the fossa ovalis, at the   junction of the SVC and the RA.               -IIIYTMLH 11/6   -Surgical culture, anaerobic culture, blood culture negative   -Follow fungal culture   -Monitor VS     2. Staph epidermidis bacteremia suspected 2/2 endocarditis s/p removal of mediport - Resolved              -Blood culture grew staph epidermidis (10/8/20)              -Mediport removed              -Repeat blood cultures (10/31) showed no growth 24hrs              -Given IV vancomycin 1000mg     3. Nausea - Resolved              -Zofran 4mg IV q6hrs PRN nausea              -Tigan 200mg intramuscular q6hrs PRN nausea              -Monitor     4. C diff infection (10/25) - Resolving              -Negative C diff toxin (10/31)              -FBDFYZE denying diarrhea for several days              -Continue vancomycin PO to complete 14 day course (10/25-11/8)     5. ESRD on peritoneal dialysis              -Nephrology following              -Continue peritoneal dialysis              -Continue bumex 2mg PO BID              -Continue metolazone 5mg PO 1x daily              -EPO 5000units 3x weekly     6. Type I DM              -Endocrine consulted, appreciate recommendations   -Start lantus 6 units at night   -Start premeal humalog 2 units with meal w/ modified sliding scale              -POCT q4hrs; glucose goal 140-180 mg/dl              -Encourage PO intake, dietary supplements              -Monitor     7. HTN              -Continue lisinopril 20mg PO 1x daily              -Continue diltiazem 240 mg PO 1x daily              -Continue Hydralazine 25mg PO q8hrs              -Monitor     8.  Hypothyroidism              -TSH 6.0. T3 1.3, T4 0.78 (10/31/20)              -Continue synthroid 75mcg PO 1x daily              -Monitor    PT/OT evaluation: N/A  DVT prophylaxis/ GI prophylaxis: Heparin/Pepcid   Disposition: inpatient monitor    Allison Masterson  MS3  Attending physician: Dr. Bang Muniz

## 2020-11-09 NOTE — PROGRESS NOTES
Department of Internal Medicine  Infectious Diseases  Progress Note      C/C :   Endocarditis     Pt is awake and alert   Denies fever or chills  Reports leg pain   Afebrile     Current Facility-Administered Medications   Medication Dose Route Frequency Provider Last Rate Last Dose    potassium chloride 10 mEq/100 mL IVPB (Peripheral Line)  10 mEq Intravenous Q1H Tori Anderson MD        insulin lispro (HUMALOG) injection vial 0-6 Units  0-6 Units Subcutaneous Q4H Citlalli Perez MD   6 Units at 11/09/20 0449    megestrol (MEGACE) 40 MG/ML suspension 200 mg  200 mg Oral Daily Jose Perkins MD   200 mg at 11/08/20 1656    aluminum hydroxide (ALTERNGEL) suspension 320 mg  320 mg Oral TID WC Jose Perkins MD   320 mg at 11/08/20 1656    [Held by provider] dextrose 5 % and 0.45 % sodium chloride infusion   Intravenous Continuous Citlalli Perez  mL/hr at 11/08/20 0825      insulin regular (HUMULIN R;NOVOLIN R) injection 5 Units  5 Units Subcutaneous Once Lucinda Foil, APRN - CNP        bisacodyl (DULCOLAX) suppository 10 mg  10 mg Rectal Daily PRN Lucinda Foil, APRN - CNP        heparin (porcine) injection 5,000 Units  5,000 Units Subcutaneous 3 times per day Lucinda Foil, APRN - CNP   5,000 Units at 11/09/20 0546    [Held by provider] insulin glargine (LANTUS) injection vial 8 Units  8 Units Subcutaneous Nightly Lucinda Foil, APRN - CNP   8 Units at 11/07/20 2039    lidocaine 4 % external patch 1 patch  1 patch Transdermal Daily Lucinda Foil, APRN - CNP   1 patch at 11/04/20 1321    hydrALAZINE (APRESOLINE) injection 10 mg  10 mg Intravenous Q4H PRN Lucinda Foil, APRN - CNP   10 mg at 11/07/20 0530    labetalol (NORMODYNE;TRANDATE) injection 10 mg  10 mg Intravenous Q4H PRN Lucinda Foil, APRN - CNP   10 mg at 11/07/20 0336    senna (SENOKOT) tablet 8.6 mg  1 tablet Oral Nightly Lucinda Foil, APRN - CNP   8.6 mg at 11/08/20 2003    levothyroxine (SYNTHROID) tablet 75 mcg  75 mcg Oral PRN [facility], HEBER - ASHLEY        polyethylene glycol (GLYCOLAX) packet 17 g  17 g Oral Daily PRN [facility]        promethazine (PHENERGAN) tablet 12.5 mg  12.5 mg Oral Q6H [facility]N [facility]        Or    ondansetron (ZOFRAN) injection 4 mg  4 mg Intravenous Q6H PRN [facility]   4 mg at 11/05/20 1703    glucose (GLUTOSE) 40 % oral gel 15 g  15 g Oral PRN [facility], APRN - CNP   15 g at 11/09/20 0758    dextrose 50 % IV solution  12.5 g Intravenous PRN [facility]   12.5 g at 11/09/20 0814    glucagon (rDNA) injection 1 mg  1 mg Intramuscular PRN [facility]  C[facility]        dextrose 5 % solution  100 mL/hr Intravenous PRN [facility] - [facility]   Stopped at 11/04/20 9213    gentamicin (GARAMYCIN) 0.1 % cream   Topical Daily [facility] C[facility]        epoetin nena-epbx (RETACRIT) injection 5,000 Units  5,000 Units Subcutaneous Once per day on Mon Wed Fri [facility]   5,000 Units at 11/06/20 3661    famotidine (PEPCID) tablet 20 mg  20 mg Oral Daily [facility]   20 mg at 11/08/20 8094    LORazepam (ATIVAN) tablet 0.5 mg  0.5 mg Oral BID PRN [facility]   0.5 mg at 11/07/20 0862       REVIEW OF SYSTEMS:    CONSTITUTIONAL:  Denies fever, chill or rigors  HEENT: denies blurring of vision or double vision, denies hearing problem  RESPIRATORY: Denies SOB   CARDIOVASCULAR:  Reports chest pain   GASTROINTESTINAL: Nausea - improved   GENITOURINARY:  Denies burning urination or frequency of urination  INTEGUMENT: denies wound , rash  HEMATOLOGIC/LYMPHATIC:  No bleeding or bruise .   MUSCULOSKELETAL:  Denies leg pain , joint pain , joint swelling  NEUROLOGICAL:  Weakness            PHYSICAL EXAM:      Vitals:     BP (!) 148/78   Pulse 101   Temp 98.2 °F (36.8 °C) (Temporal)   Resp 16   Ht 5' 4\" (1.626 m)   Wt 138 lb 9.6 oz (62.9 kg)   LMP 07/01/2020   SpO2 99%   BMI 23.79 kg/m² General Appearance:    Awake and alert, no distress    Head:    Normocephalic, atraumatic   Eyes:    No pallor, no icterus,   Ears:    No obvious deformity or drainage.    Nose:   No nasal drainage   Throat:   Mucosa moist, no oral thrush   Neck:   Supple, no lymphadenopathy   Lungs:     Clear to auscultation bilaterally,    Heart:    Tachycardic, systolic murmur +   Abdomen:     Soft, non-tender, bowel sounds present, PD catheter in place    Extremities:   No edema, no cyanosis   Pulses:   Dorsalis pedis palpable    Skin:   no rashes or lesions     CBC with Differential:      Lab Results   Component Value Date    WBC 7.6 11/09/2020    RBC 3.34 11/09/2020    HGB 9.9 11/09/2020    HCT 31.5 11/09/2020     11/09/2020    MCV 94.3 11/09/2020    MCH 29.6 11/09/2020    MCHC 31.4 11/09/2020    RDW 15.7 11/09/2020    NRBC 0.9 08/10/2020    SEGSPCT 67 06/27/2013    METASPCT 1.7 08/10/2020    LYMPHOPCT 18.6 11/09/2020    MONOPCT 4.9 11/09/2020    BASOPCT 0.9 11/09/2020    MONOSABS 0.37 11/09/2020    LYMPHSABS 1.41 11/09/2020    EOSABS 0.32 11/09/2020    BASOSABS 0.07 11/09/2020       CMP     Lab Results   Component Value Date     11/09/2020    K 2.9 11/09/2020    K 3.7 10/11/2020     11/09/2020    CO2 23 11/09/2020    BUN 39 11/09/2020    CREATININE 7.4 11/09/2020    GFRAA 8 11/09/2020    LABGLOM 8 11/09/2020    GLUCOSE 65 11/09/2020    GLUCOSE 130 05/18/2012    PROT 5.3 11/09/2020    LABALBU 3.7 11/09/2020    LABALBU 4.1 05/18/2012    CALCIUM 8.8 11/09/2020    BILITOT <0.2 11/09/2020    ALKPHOS 142 11/09/2020    AST 12 11/09/2020    ALT <5 11/09/2020         Hepatic Function Panel:    Lab Results   Component Value Date    ALKPHOS 142 11/09/2020    ALT <5 11/09/2020    AST 12 11/09/2020    PROT 5.3 11/09/2020    BILITOT <0.2 11/09/2020    BILIDIR <0.2 10/11/2020    IBILI see below 10/11/2020    LABALBU 3.7 11/09/2020    LABALBU 4.1 05/18/2012       PT/INR:    Lab Results   Component Value Date    PROTIME

## 2020-11-09 NOTE — CARE COORDINATION
SOCIAL WORK/CASEMANAGEMENT TRANSITION OF CARE [id number] [place]  [facility] [address], [place], [phone]): met with pt in the room this a.m. she is depressed that she is not able to go home yet. Her blood sugar is up and down. Endocrinology was consulted today. Pt said if her blood sugar is the issue she may never get out of here. No needs anticipated at discharge. Vickey/donte to follow.  Oliverio Encarnacion  11/9/2020

## 2020-11-09 NOTE — CONSULTS
20  2008 20  2352 20  0238 20  0446 20  0753 20  0812 20  0815   GLUMET 150* 141* 378* 389* 403* <40* <40* 124*       Past medical history:   Past Medical History:   Diagnosis Date    Acute congestive [facility] failure ([facility]     [facility] 10/1/2019    Cephalgia 10/9/2019    Chronic kidney disease     Depression     Diabetes mellitus ([facility]     Diabetic ketoacidosis ([facility] 2011    Hemod[facility] patient ([facility]     History of blood transfusion 2019    Hyperlipidemia 10/8/2020    Hypothyroidism 10/8/2020    Iron deficiency anemia 10/1/2019    MDRO (multiple drug resistant organisms) resistance     MRSA (methicillin resistant Staphylococcus aureus)     back wound abcess    Non compliance w medication regimen 3/30/2016    Other disorders of kidney and ureter     Pregnancy 3/30/2016    16 weeks    Severe pre-eclampsia in third trimester 2016       Past surgical history:  Past Surgical History:   Procedure Laterality Date    BACK SURGERY      abscess     SECTION      x2    CHOLECYSTECTOMY, LAPAROSCOPIC N/A 2019    CHOLECYSTECTOMY LAPAROSCOPIC performed by Joon Mims MD at [place] [address] N/A 2018    COLONOSCOPY WITH BIOPSY performed by Shelley Childs MD at [address] [place] [address] COLONOSCOPY N/A 2018    COLONOSCOPY WITH BIOPSY performed by Krishna Ferraro MD at [address] [place] [address] ECHO COMPL W [address]  2012    EF 57%    ECHO COMPL W DOP COLOR FLOW  6/10/2013         EMBOLECTOMY N/A 2020    [facility], [facility] Carly, YULISSA -- REQS ROOM 3 performed by Bowen Aguirre MD at [facility] N/A 2020    LAPAROSCOPIC INSERTION PERITONEAL DIALYSIS CATHETER performed by Lubna Hatch MD at [facility] ESOPHAGOGASTRODUODENOSCOPY TRANSORAL DIAGNOSTIC N/A 2018    EGD ESOPHAGOGASTRODUODENOSCOPY performed by Shelley Childs MD at [address] [place] [address] Units Subcutaneous Once per day on Mon Wed Fri    famotidine  20 mg Oral Daily     PRN Meds:   bisacodyl, 10 mg, Daily PRN  hydrALAZINE, 10 mg, Q4H PRN  labetalol, 10 mg, Q4H PRN  oxyCODONE-acetaminophen, 0.5 tablet, Q6H PRN    Or  oxyCODONE-acetaminophen, 1 tablet, Q6H PRN  sodium chloride flush, 10 mL, PRN  heparin flush, 1 mL, PRN  sodium chloride flush, 10 mL, PRN  acetaminophen, 650 mg, Q6H PRN    Or  acetaminophen, 650 mg, Q6H PRN  polyethylene glycol, 17 g, Daily PRN  promethazine, 12.5 mg, Q6H PRN    Or  ondansetron, 4 mg, Q6H PRN  glucose, 15 g, PRN  dextrose, 12.5 g, PRN  glucagon (rDNA), 1 mg, PRN  dextrose, 100 mL/hr, PRN  LORazepam, 0.5 mg, BID PRN      Continuous Infusions:   [Held by provider] dextrose 5 % and 0.45 % NaCl 100 mL/hr at 11/08/20 0825    dextrose Stopped (11/04/20 0852)       Review of Systems  All systems reviewed. All negative except for symptoms mentioned in HPI     Constitutional: No fever, no chills, no diaphoresis, no generalized weakness. HEENT: No blurred vision, No sore throat, no ear pain, no hair loss  Neck: denied any neck swelling, difficulty swallowing,   Cardio-pulmonary: No CP, SOB or palpitation, No orthopnea or PND. No cough or wheezing. GI: No N/V/D, no constipation, No abdominal pain, no melena or hematochezia   : Denied any dysuria, hematuria, flank pain, discharge, or incontinence. Skin: denied any rash, ulcer, or hyperpigmentation. MSK: denied any joint deformity, joint pain/swelling, muscle pain, or back pain.   Neuro: no numbness, no tingling, no weakness, _    OBJECTIVE    BP (!) 148/78   Pulse 101   Temp 98.2 °F (36.8 °C) (Temporal)   Resp 16   Ht 5' 4\" (1.626 m)   Wt 138 lb 9.6 oz (62.9 kg)   LMP 07/01/2020   SpO2 99%   BMI 23.79 kg/m²     Intake/Output Summary (Last 24 hours) at 11/9/2020 0936  Last data filed at 11/9/2020 0700  Gross per 24 hour   Intake 1742.8 ml   Output 2008 ml   Net -265.2 ml       Physical examination:  General: awake Date    LABA1C 8.6 07/19/2020    GLUCOSE 65 11/09/2020    GLUCOSE 130 05/18/2012    MALBCR 2949.3 01/15/2020    LABMICR 1740.1 01/15/2020    LABCREA 59 01/15/2020    LABCREA 61 01/15/2020     Lab Results   Component Value Date    TRIG 82 01/14/2020    HDL 33 01/14/2020    LDLCALC 46 01/14/2020    CHOL 95 01/14/2020       Blood culture   Lab Results   Component Value Date    BC 24 Hours no growth 11/07/2020    BC 5 Days no growth 10/31/2020       Radiology:  XR CHEST PORTABLE   Final Result   No definite new tubing is visualized radiographically, except for small IV   site in right upper arm. New linear densities in lateral left lung base may reflect atelectasis versus   or early infiltrate      Slight cardiomegaly without vascular congestion         XR CHEST PORTABLE   Final Result   Mild areas of patchy opacities in the bilateral lower lungs which could   represent atelectasis versus airspace disease process. Mild cardiomegaly. Medical Records/Labs/Images review:   I personally reviewed and summarized previous records   All labs and imaging were reviewed independently     [address] Nikolay Maldonado, a 31 y.o.-old female seen today for inpatient diabetes management     Diabetes Mellitus type I ,  · Patient's diabetes is uncontrolled, labile blood glucose. · For now, will change diabetes regimen to  · Lantus 7 units at night  · Premeal humalog 3 units with meal  · Modified sliding scale , insetting of hypoglycemia  · Continue glucose check with meals and at bedtime   · Will titrate insulin dose based on the blood glucose trend & insulin requirement  · Pt will follow with us shortly after discharge. Endocrine follow up visit, Monday 11/23 at 9:00AM     Hypothyroidism  · On levothyroxine 75 mcg daily ,last TSH: 6  · Continue levothyroxine 75 mcg daily .  Patient advised to take levothyroxine in morning at empty stomach, wait one hour prior to eating and avoid iron and multivitamin with it    ESRD  · continue CCPD , nephrology following    Infective Endocarditis s/p vegectomy  · Management per ID, CTS, cardiology    Interdisciplinary plan for communication with healthcare providers:   Consult recommendations were discussed with the Primary Service/Nursing staff      The above issues were reviewed with the patient who understood and agreed with the plan. Ivana Guillen PGY 3  Internal Medicine Resident    Thank you for allowing us to participate in the care of this patient. Please do not hesitate to contact us with any additional questions. Enrique Floyd MD  Endocrinologist, [facility] Diabetes Care and Endocrinology   [address] [place] [id number]   Phone: [phone]  Fax: [phone]  ----------------------------  An electronic signature was used to authenticate this note.  Romulo Sargent MD on 11/9/2020 at 8:08 PM

## 2020-11-10 PROBLEM — I38 VALVULAR ENDOCARDITIS: Status: ACTIVE | Noted: 2020-11-10

## 2020-11-10 LAB
ALBUMIN SERPL-MCNC: 3.4 G/DL (ref 3.5–5.2)
ALP BLD-CCNC: 146 U/L (ref 35–104)
ALT SERPL-CCNC: <5 U/L (ref 0–32)
ANION GAP SERPL CALCULATED.3IONS-SCNC: 14 MMOL/L (ref 7–16)
AST SERPL-CCNC: 11 U/L (ref 0–31)
BASOPHILS ABSOLUTE: 0.09 E9/L (ref 0–0.2)
BASOPHILS RELATIVE PERCENT: 1 % (ref 0–2)
BILIRUB SERPL-MCNC: <0.2 MG/DL (ref 0–1.2)
BUN BLDV-MCNC: 45 MG/DL (ref 6–20)
CALCIUM SERPL-MCNC: 8.9 MG/DL (ref 8.6–10.2)
CHLORIDE BLD-SCNC: 102 MMOL/L (ref 98–107)
CO2: 26 MMOL/L (ref 22–29)
CREAT SERPL-MCNC: 7.5 MG/DL (ref 0.5–1)
EOSINOPHILS ABSOLUTE: 0.44 E9/L (ref 0.05–0.5)
EOSINOPHILS RELATIVE PERCENT: 5 % (ref 0–6)
GFR AFRICAN AMERICAN: 8
GFR NON-AFRICAN AMERICAN: 8 ML/MIN/1.73
GLUCOSE BLD-MCNC: 119 MG/DL (ref 74–99)
GLUCOSE BLD-MCNC: 580 MG/DL (ref 74–99)
HCT VFR BLD CALC: 30 % (ref 34–48)
HEMOGLOBIN: 9.3 G/DL (ref 11.5–15.5)
IMMATURE GRANULOCYTES #: 0.02 E9/L
IMMATURE GRANULOCYTES %: 0.2 % (ref 0–5)
LYMPHOCYTES ABSOLUTE: 2.31 E9/L (ref 1.5–4)
LYMPHOCYTES RELATIVE PERCENT: 26 % (ref 20–42)
MAGNESIUM: 1.9 MG/DL (ref 1.6–2.6)
MCH RBC QN AUTO: 29.1 PG (ref 26–35)
MCHC RBC AUTO-ENTMCNC: 31 % (ref 32–34.5)
MCV RBC AUTO: 93.8 FL (ref 80–99.9)
METER GLUCOSE: 134 MG/DL (ref 74–99)
METER GLUCOSE: 145 MG/DL (ref 74–99)
METER GLUCOSE: 264 MG/DL (ref 74–99)
METER GLUCOSE: 266 MG/DL (ref 74–99)
METER GLUCOSE: 306 MG/DL (ref 74–99)
METER GLUCOSE: 46 MG/DL (ref 74–99)
METER GLUCOSE: 50 MG/DL (ref 74–99)
METER GLUCOSE: 51 MG/DL (ref 74–99)
METER GLUCOSE: 88 MG/DL (ref 74–99)
METER GLUCOSE: >500 MG/DL (ref 74–99)
MONOCYTES ABSOLUTE: 0.64 E9/L (ref 0.1–0.95)
MONOCYTES RELATIVE PERCENT: 7.2 % (ref 2–12)
NEUTROPHILS ABSOLUTE: 5.37 E9/L (ref 1.8–7.3)
NEUTROPHILS RELATIVE PERCENT: 60.6 % (ref 43–80)
PDW BLD-RTO: 15.3 FL (ref 11.5–15)
PHOSPHORUS: 5.7 MG/DL (ref 2.5–4.5)
PLATELET # BLD: 279 E9/L (ref 130–450)
PMV BLD AUTO: 10.6 FL (ref 7–12)
POC ACT LR: 149 SECONDS
POC ACT LR: 304 SECONDS
POTASSIUM SERPL-SCNC: 3.8 MMOL/L (ref 3.5–5)
RBC # BLD: 3.2 E12/L (ref 3.5–5.5)
SODIUM BLD-SCNC: 142 MMOL/L (ref 132–146)
TOTAL PROTEIN: 5.1 G/DL (ref 6.4–8.3)
WBC # BLD: 8.9 E9/L (ref 4.5–11.5)

## 2020-11-10 PROCEDURE — 83735 ASSAY OF MAGNESIUM: CPT

## 2020-11-10 PROCEDURE — 36592 COLLECT BLOOD FROM PICC: CPT

## 2020-11-10 PROCEDURE — 6360000002 HC RX W HCPCS: Performed by: NURSE PRACTITIONER

## 2020-11-10 PROCEDURE — 6370000000 HC RX 637 (ALT 250 FOR IP): Performed by: NURSE PRACTITIONER

## 2020-11-10 PROCEDURE — 82962 GLUCOSE BLOOD TEST: CPT

## 2020-11-10 PROCEDURE — 6370000000 HC RX 637 (ALT 250 FOR IP): Performed by: INTERNAL MEDICINE

## 2020-11-10 PROCEDURE — 2580000003 HC RX 258: Performed by: NURSE PRACTITIONER

## 2020-11-10 PROCEDURE — 90945 DIALYSIS ONE EVALUATION: CPT | Performed by: INTERNAL MEDICINE

## 2020-11-10 PROCEDURE — 82108 ASSAY OF ALUMINUM: CPT

## 2020-11-10 PROCEDURE — 99232 SBSQ HOSP IP/OBS MODERATE 35: CPT | Performed by: INTERNAL MEDICINE

## 2020-11-10 PROCEDURE — 84100 ASSAY OF PHOSPHORUS: CPT

## 2020-11-10 PROCEDURE — 2140000000 HC CCU INTERMEDIATE R&B

## 2020-11-10 PROCEDURE — 99231 SBSQ HOSP IP/OBS SF/LOW 25: CPT | Performed by: INTERNAL MEDICINE

## 2020-11-10 PROCEDURE — 85025 COMPLETE CBC W/AUTO DIFF WBC: CPT

## 2020-11-10 PROCEDURE — 82947 ASSAY GLUCOSE BLOOD QUANT: CPT

## 2020-11-10 PROCEDURE — 80053 COMPREHEN METABOLIC PANEL: CPT

## 2020-11-10 PROCEDURE — 6360000002 HC RX W HCPCS: Performed by: INTERNAL MEDICINE

## 2020-11-10 PROCEDURE — 36415 COLL VENOUS BLD VENIPUNCTURE: CPT

## 2020-11-10 RX ORDER — SODIUM CHLORIDE 0.9 % (FLUSH) 0.9 %
10 SYRINGE (ML) INJECTION PRN
Status: CANCELLED | OUTPATIENT
Start: 2020-11-10

## 2020-11-10 RX ORDER — DEXTROSE MONOHYDRATE 25 G/50ML
25 INJECTION, SOLUTION INTRAVENOUS ONCE
Status: DISCONTINUED | OUTPATIENT
Start: 2020-11-10 | End: 2020-11-10

## 2020-11-10 RX ORDER — HEPARIN SODIUM (PORCINE) LOCK FLUSH IV SOLN 100 UNIT/ML 100 UNIT/ML
500 SOLUTION INTRAVENOUS PRN
Status: CANCELLED | OUTPATIENT
Start: 2020-11-10

## 2020-11-10 RX ADMIN — INSULIN LISPRO 2 UNITS: 100 INJECTION, SOLUTION INTRAVENOUS; SUBCUTANEOUS at 21:00

## 2020-11-10 RX ADMIN — INSULIN LISPRO 2 UNITS: 100 INJECTION, SOLUTION INTRAVENOUS; SUBCUTANEOUS at 11:23

## 2020-11-10 RX ADMIN — INSULIN GLARGINE 7 UNITS: 100 INJECTION, SOLUTION SUBCUTANEOUS at 21:01

## 2020-11-10 RX ADMIN — OXYCODONE AND ACETAMINOPHEN 1 TABLET: 5; 325 TABLET ORAL at 18:24

## 2020-11-10 RX ADMIN — HEPARIN 100 UNITS: 100 SYRINGE at 15:14

## 2020-11-10 RX ADMIN — INSULIN LISPRO 1 UNITS: 100 INJECTION, SOLUTION INTRAVENOUS; SUBCUTANEOUS at 02:31

## 2020-11-10 RX ADMIN — HYDRALAZINE HYDROCHLORIDE 25 MG: 25 TABLET, FILM COATED ORAL at 21:01

## 2020-11-10 RX ADMIN — ALUMINUM HYDROXIDE 320 MG: 320 LIQUID ORAL at 09:07

## 2020-11-10 RX ADMIN — FENTANYL CITRATE 12.5 MCG: 50 INJECTION, SOLUTION INTRAMUSCULAR; INTRAVENOUS at 02:37

## 2020-11-10 RX ADMIN — HYDRALAZINE HYDROCHLORIDE 25 MG: 25 TABLET, FILM COATED ORAL at 06:03

## 2020-11-10 RX ADMIN — DILTIAZEM HYDROCHLORIDE 240 MG: 240 CAPSULE, EXTENDED RELEASE ORAL at 09:05

## 2020-11-10 RX ADMIN — HEPARIN SODIUM 5000 UNITS: 10000 INJECTION INTRAVENOUS; SUBCUTANEOUS at 15:14

## 2020-11-10 RX ADMIN — OXYCODONE AND ACETAMINOPHEN 1 TABLET: 5; 325 TABLET ORAL at 09:33

## 2020-11-10 RX ADMIN — ALUMINUM HYDROXIDE 320 MG: 320 LIQUID ORAL at 18:28

## 2020-11-10 RX ADMIN — LEVOTHYROXINE SODIUM 75 MCG: 0.07 TABLET ORAL at 06:03

## 2020-11-10 RX ADMIN — INSULIN LISPRO 1 UNITS: 100 INJECTION, SOLUTION INTRAVENOUS; SUBCUTANEOUS at 18:21

## 2020-11-10 RX ADMIN — Medication 10 ML: at 09:06

## 2020-11-10 RX ADMIN — HYDRALAZINE HYDROCHLORIDE 25 MG: 25 TABLET, FILM COATED ORAL at 15:22

## 2020-11-10 RX ADMIN — STANDARDIZED SENNA CONCENTRATE 8.6 MG: 8.6 TABLET ORAL at 21:01

## 2020-11-10 RX ADMIN — HEPARIN SODIUM 5000 UNITS: 10000 INJECTION INTRAVENOUS; SUBCUTANEOUS at 06:03

## 2020-11-10 RX ADMIN — Medication 15 G: at 15:26

## 2020-11-10 RX ADMIN — SODIUM CHLORIDE, PRESERVATIVE FREE 100 UNITS: 5 INJECTION INTRAVENOUS at 21:00

## 2020-11-10 RX ADMIN — GENTAMICIN SULFATE: 1 CREAM TOPICAL at 09:08

## 2020-11-10 RX ADMIN — INSULIN LISPRO 3 UNITS: 100 INJECTION, SOLUTION INTRAVENOUS; SUBCUTANEOUS at 09:06

## 2020-11-10 RX ADMIN — METOLAZONE 5 MG: 5 TABLET ORAL at 09:05

## 2020-11-10 RX ADMIN — ALUMINUM HYDROXIDE 320 MG: 320 LIQUID ORAL at 11:23

## 2020-11-10 RX ADMIN — BUMETANIDE 2 MG: 1 TABLET ORAL at 21:01

## 2020-11-10 RX ADMIN — FAMOTIDINE 20 MG: 20 TABLET ORAL at 09:05

## 2020-11-10 RX ADMIN — INSULIN HUMAN 10 UNITS: 100 INJECTION, SOLUTION PARENTERAL at 01:25

## 2020-11-10 RX ADMIN — HEPARIN SODIUM 5000 UNITS: 10000 INJECTION INTRAVENOUS; SUBCUTANEOUS at 21:01

## 2020-11-10 RX ADMIN — SODIUM CHLORIDE, PRESERVATIVE FREE 100 UNITS: 5 INJECTION INTRAVENOUS at 09:06

## 2020-11-10 RX ADMIN — BUMETANIDE 2 MG: 1 TABLET ORAL at 09:05

## 2020-11-10 RX ADMIN — MEGESTROL ACETATE 200 MG: 40 SUSPENSION ORAL at 09:07

## 2020-11-10 RX ADMIN — Medication 10 ML: at 21:00

## 2020-11-10 RX ADMIN — INSULIN LISPRO 3 UNITS: 100 INJECTION, SOLUTION INTRAVENOUS; SUBCUTANEOUS at 11:25

## 2020-11-10 ASSESSMENT — PAIN SCALES - GENERAL
PAINLEVEL_OUTOF10: 3
PAINLEVEL_OUTOF10: 7
PAINLEVEL_OUTOF10: 7
PAINLEVEL_OUTOF10: 0
PAINLEVEL_OUTOF10: 8
PAINLEVEL_OUTOF10: 7

## 2020-11-10 ASSESSMENT — PAIN DESCRIPTION - PAIN TYPE: TYPE: ACUTE PAIN

## 2020-11-10 ASSESSMENT — PAIN - FUNCTIONAL ASSESSMENT: PAIN_FUNCTIONAL_ASSESSMENT: ACTIVITIES ARE NOT PREVENTED

## 2020-11-10 ASSESSMENT — PAIN DESCRIPTION - ONSET: ONSET: ON-GOING

## 2020-11-10 ASSESSMENT — PAIN SCALES - WONG BAKER: WONGBAKER_NUMERICALRESPONSE: 0

## 2020-11-10 ASSESSMENT — PAIN DESCRIPTION - FREQUENCY: FREQUENCY: CONTINUOUS

## 2020-11-10 ASSESSMENT — PAIN DESCRIPTION - LOCATION: LOCATION: GROIN

## 2020-11-10 ASSESSMENT — PAIN DESCRIPTION - PROGRESSION: CLINICAL_PROGRESSION: NOT CHANGED

## 2020-11-10 ASSESSMENT — PAIN DESCRIPTION - DIRECTION: RADIATING_TOWARDS: HIP

## 2020-11-10 ASSESSMENT — PAIN DESCRIPTION - DESCRIPTORS: DESCRIPTORS: ACHING;CONSTANT

## 2020-11-10 ASSESSMENT — PAIN DESCRIPTION - ORIENTATION: ORIENTATION: RIGHT

## 2020-11-10 NOTE — PROGRESS NOTES
Dr. Miriam Castellanos contacted regarding critical blood sugar of 592. Ordered 7 units of lantus and 4 units of humalog. Will recheck patient again.

## 2020-11-10 NOTE — PROGRESS NOTES
[facility]  Internal Medicine Residency / [address]    Attending Physician Statement  I have discussed the case, including pertinent history and exam findings with the resident and the team.  I have seen and examined the patient and the key elements of the encounter have been performed by me. I agree with the assessment, plan and orders as documented by the resident. A&O  Better affect this AM  Still C/O local pain RLQ groin area and no evidence for hematoma  undergoing PD and BS elevation at night might be secondary to dialysate  Insulin coverage per Endo  VS stable  No DKA  BS lability expected from DM 1   Remains on Vanco for staph epi endocarditis   Plan: Continue same   Remainder of medical problems as per resident note.       Hi Simpson MD FRCP([facility])  Internal Medicine Residency Faculty

## 2020-11-10 NOTE — PLAN OF CARE
Problem: Pain:  Goal: Pain level will decrease  Description: Pain level will decrease  Outcome: Met This Shift     Problem: Skin Integrity:  Goal: Will show no infection signs and symptoms  Description: Will show no infection signs and symptoms  Outcome: Met This Shift     Problem: Skin Integrity:  Goal: Absence of new skin breakdown  Description: Absence of new skin breakdown  Outcome: Met This Shift
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
## 2020-11-10 NOTE — PROGRESS NOTES
Department of Internal Medicine  Infectious Diseases  Progress Note      C/C :   Endocarditis     Pt is awake and alert   Denies fever or chills  Reports leg pain   Afebrile     Current Facility-Administered Medications   Medication Dose Route Frequency Provider Last Rate Last Dose    insulin lispro (HUMALOG) injection vial 3 Units  3 Units Subcutaneous TID  Shania Leal MD   3 Units at 11/10/20 1125    [START ON 11/11/2020] insulin lispro (HUMALOG) injection vial 0-3 Units  0-3 Units Subcutaneous Once Dustin Sifuentes MD        insulin glargine (LANTUS) injection vial 7 Units  7 Units Subcutaneous Nightly Shania Leal MD   7 Units at 11/09/20 2141    insulin lispro (HUMALOG) injection vial 0-4 Units  0-4 Units Subcutaneous 4x Daily AC & HS Shania Leal MD   2 Units at 11/10/20 1123    megestrol (MEGACE) 40 MG/ML suspension 200 mg  200 mg Oral Daily Марина Fletcher MD   200 mg at 11/10/20 0907    aluminum hydroxide (ALTERNGEL) suspension 320 mg  320 mg Oral TID  Марина Fletcher MD   320 mg at 11/10/20 1123    [Held by provider] dextrose 5 % and 0.45 % sodium chloride infusion   Intravenous Continuous Lizeth Ugarte  mL/hr at 11/08/20 0825      bisacodyl (DULCOLAX) suppository 10 mg  10 mg Rectal Daily PRN Cinthia Palms, APRN - CNP        heparin (porcine) injection 5,000 Units  5,000 Units Subcutaneous 3 times per day Cinthia Palms, APRN - CNP   5,000 Units at 11/10/20 0603    lidocaine 4 % external patch 1 patch  1 patch Transdermal Daily Cinthia Palms, APRN - CNP   Stopped at 11/10/20 0915    hydrALAZINE (APRESOLINE) injection 10 mg  10 mg Intravenous Q4H PRN Cinthia Palms, APRN - CNP   10 mg at 11/07/20 0530    labetalol (NORMODYNE;TRANDATE) injection 10 mg  10 mg Intravenous Q4H PRN Cinthia Palms, APRN - CNP   10 mg at 11/07/20 0336    senna (SENOKOT) tablet 8.6 mg  1 tablet Oral Nightly Cinthia Palms, APRN - CNP   8.6 mg at 11/09/20 8990    levothyroxine (SYNTHROID) tablet 75 mcg  75 mcg Oral QAM AC Larwance Bruch, APRN - CNP   75 mcg at 11/10/20 0603    dilTIAZem (CARDIZEM CD) extended release capsule 240 mg  240 mg Oral Daily Larwance Bruch, APRN - CNP   240 mg at 11/10/20 9143    bumetanide (BUMEX) tablet 2 mg  2 mg Oral BID Larwance Bruch, APRN - CNP   2 mg at 11/10/20 7311    metOLazone (ZAROXOLYN) tablet 5 mg  5 mg Oral Daily Larwance Bruch, APRN - CNP   5 mg at 11/10/20 0905    oxyCODONE-acetaminophen (PERCOCET) 5-325 MG per tablet 0.5 tablet  0.5 tablet Oral Q6H PRN Larwance Bruch, APRN - CNP        Or    oxyCODONE-acetaminophen (PERCOCET) 5-325 MG per tablet 1 tablet  1 tablet Oral Q6H PRN Larwance Bruch, APRN - CNP   1 tablet at 11/10/20 1702    hydrALAZINE (APRESOLINE) tablet 25 mg  25 mg Oral 3 times per day Larwance Bruch, APRN - CNP   25 mg at 11/10/20 0603    sodium chloride flush 0.9 % injection 10 mL  10 mL Intravenous PRN Larwance Bruch, APRN - CNP   10 mL at 11/07/20 1206    heparin flush 100 UNIT/ML injection 100 Units  1 mL Intravenous 2 times per day Larwance Bruch, APRN - CNP   100 Units at 11/10/20 0906    heparin flush 100 UNIT/ML injection 100 Units  1 mL Intracatheter PRN Larwance Bruch, APRN - CNP   100 Units at 11/09/20 2150    vancomycin (VANCOCIN) oral solution 250 mg  250 mg Oral 4 times per day Larwance Bruch, APRN - CNP   250 mg at 11/10/20 1124    calcium gluconate 10 % injection 1 g  1 g Intravenous Once Larwance Bruch, APRN - CNP        sodium chloride flush 0.9 % injection 10 mL  10 mL Intravenous 2 times per day Larwance Bruch, APRN - CNP   10 mL at 11/10/20 0906    sodium chloride flush 0.9 % injection 10 mL  10 mL Intravenous PRN Larwance Bruch, APRN - CNP   10 mL at 11/05/20 0029    acetaminophen (TYLENOL) tablet 650 mg  650 mg Oral Q6H PRN Ayane HEBER Lozano - CNP   650 mg at 11/04/20 2331    Or    acetaminophen (TYLENOL) suppository 650 mg  650 mg Rectal Q6H PRN Larwance HEBER Lozano - CNP        polyethylene glycol (GLYCOLAX) packet 17 g  17 g Oral Daily PRN [facility], APRN - [facility]        promethazine (PHENERGAN) tablet 12.5 mg  12.5 mg Oral Q6H PRN [facility], APRN - CNP        Or    ondansetron (ZOFRAN) injection 4 mg  4 mg Intravenous Q6H PRN [facility], APRN - CNP   4 mg at 11/05/20 1703    glucose (GLUTOSE) 40 % oral gel 15 g  15 g Oral PRN [facility], APRN - CNP   15 g at 11/09/20 0758    dextrose 50 % IV solution  12.5 g Intravenous PRN [facility], APRN - CNP   12.5 g at 11/09/20 0814    glucagon (rDNA) injection 1 mg  1 mg Intramuscular PRN [facility], APRN - CNP        dextrose 5 % solution  100 mL/hr Intravenous PRN [facility], APRN - CNP   Stopped at 11/04/20 5248    gentamicin (GARAMYCIN) 0.1 % cream   Topical Daily [facility] APRBOLIVAR - CNP        epoetin nena-epbx (RETACRIT) injection 5,000 Units  5,000 Units Subcutaneous Once per day on Mon Wed Fri [facility]   5,000 Units at 11/09/20 1113    famotidine (PEPCID) tablet 20 mg  20 mg Oral Daily [facility], APRN - CNP   20 mg at 11/10/20 1657    LORazepam (ATIVAN) tablet 0.5 mg  0.5 mg Oral BID PRN [facility], APRN - CNP   0.5 mg at 11/09/20 2150       REVIEW OF SYSTEMS:    CONSTITUTIONAL:  Denies fever, chill or rigors  HEENT: denies blurring of vision or double vision, denies hearing problem  RESPIRATORY: Denies SOB   CARDIOVASCULAR:  Reports chest pain   GASTROINTESTINAL: Nausea - improved   GENITOURINARY:  Denies burning urination or frequency of urination  INTEGUMENT: denies wound , rash  HEMATOLOGIC/LYMPHATIC:  No bleeding or bruise .   MUSCULOSKELETAL:  Denies leg pain , joint pain , joint swelling  NEUROLOGICAL:  Weakness            PHYSICAL EXAM:      Vitals:     /72 Comment: manual  Pulse 106   Temp 97.5 °F (36.4 °C) (Temporal)   Resp 18   Ht 5' 4\" (1.626 m)   Wt 139 lb 6.4 oz (63.2 kg)   LMP 07/01/2020   SpO2 98%   BMI 23.93 kg/m²     General Appearance:    Awake and alert, no distress    Head:    Normocephalic, atraumatic   Eyes:    No pallor, no icterus,   Ears:    No obvious deformity or drainage.    Nose:   No nasal drainage   Throat:   Mucosa moist, no oral thrush   Neck:   Supple, no lymphadenopathy   Lungs:     Clear to auscultation bilaterally,    Heart:    Tachycardic, systolic murmur +   Abdomen:     Soft, non-tender, bowel sounds present, PD catheter in place    Extremities:   No edema, no cyanosis   Pulses:   Dorsalis pedis palpable    Skin:   no rashes or lesions     CBC with Differential:      Lab Results   Component Value Date    WBC 8.9 11/10/2020    RBC 3.20 11/10/2020    HGB 9.3 11/10/2020    HCT 30.0 11/10/2020     11/10/2020    MCV 93.8 11/10/2020    MCH 29.1 11/10/2020    MCHC 31.0 11/10/2020    RDW 15.3 11/10/2020    NRBC 0.9 08/10/2020    SEGSPCT 67 06/27/2013    METASPCT 1.7 08/10/2020    LYMPHOPCT 26.0 11/10/2020    MONOPCT 7.2 11/10/2020    BASOPCT 1.0 11/10/2020    MONOSABS 0.64 11/10/2020    LYMPHSABS 2.31 11/10/2020    EOSABS 0.44 11/10/2020    BASOSABS 0.09 11/10/2020       CMP     Lab Results   Component Value Date     11/10/2020    K 3.8 11/10/2020    K 3.7 10/11/2020     11/10/2020    CO2 26 11/10/2020    BUN 45 11/10/2020    CREATININE 7.5 11/10/2020    GFRAA 8 11/10/2020    LABGLOM 8 11/10/2020    GLUCOSE 119 11/10/2020    GLUCOSE 130 05/18/2012    PROT 5.1 11/10/2020    LABALBU 3.4 11/10/2020    LABALBU 4.1 05/18/2012    CALCIUM 8.9 11/10/2020    BILITOT <0.2 11/10/2020    ALKPHOS 146 11/10/2020    AST 11 11/10/2020    ALT <5 11/10/2020         Hepatic Function Panel:    Lab Results   Component Value Date    ALKPHOS 146 11/10/2020    ALT <5 11/10/2020    AST 11 11/10/2020    PROT 5.1 11/10/2020    BILITOT <0.2 11/10/2020    BILIDIR <0.2 10/11/2020    IBILI see below 10/11/2020    LABALBU 3.4 11/10/2020    LABALBU 4.1 05/18/2012       PT/INR:    Lab Results   Component Value Date    PROTIME 10.6 10/31/2020    PROTIME 13.6 08/14/2011 INR 1.0 10/31/2020       TSH:    Lab Results   Component Value Date    TSH 6.000 10/31/2020       U/A:    Lab Results   Component Value Date    COLORU Yellow 11/01/2020    PHUR 8.0 11/01/2020    LABCAST RARE 01/12/2020    WBCUA NONE 11/01/2020    WBCUA 0-1 05/18/2012    RBCUA 2-5 11/01/2020    RBCUA 1-3 08/01/2013    MUCUS Present 02/12/2016    TRICHOMONAS Present 07/17/2020    YEAST RARE 05/24/2018    BACTERIA FEW 11/01/2020    CLARITYU Clear 11/01/2020    SPECGRAV 1.020 11/01/2020    LEUKOCYTESUR Negative 11/01/2020    UROBILINOGEN 0.2 11/01/2020    BILIRUBINUR Negative 11/01/2020    BILIRUBINUR SMALL 05/18/2012    BLOODU SMALL 11/01/2020    GLUCOSEU 250 11/01/2020    GLUCOSEU >=1000 05/18/2012    AMORPHOUS RARE 11/01/2019       ABG:    Lab Results   Component Value Date    VTL4OCU 22.1 10/29/2019    B2RXDSTZ 97.7 09/08/2012    PHART 7.345 07/20/2012    YVN2OUJ 35.0 10/29/2019    PO2ART 91.5 10/29/2019       MICROBIOLOGY:    Blood culture - Staph epidermidis  ( 10/8)     Vanco random level  (11/4)  20.3     Cx ( 11/6) neg         YULISSA -     Summary    Ejection fraction is visually estimated at 60-65%.    There is a right atrial mobile mass approximately 2.4 cm x 0.4 cm, which    may represent a vegetation or mobile thrombus.    It attaches to the atrial septum superior to the fossa ovalis, at the    junction of the SVC and the RA.    Previously seen central venous catheter and associated mass are no longer    seen on this study.    Mild mitral regurgitation.    Mild tricuspid regurgitation.    No evidence of valvular vegetations       IMPRESSION:     1. Staph epidermidis bacteremia - endocarditis -s/p removal of the mediport   YULISSA - right atrial mass ? Clot vs vegetation ( 2.4 x0.4 cm ) s/p removal ( 11/6)- cx neg    2. C diff infection ( at [facility] on 10/25) - diarrhea stopped     RECOMMENDATIONS:      1. Vancomycin 1 gram IV q week for 2 weeks   2. Stop oral vancomycin   3.   Await pathology

## 2020-11-10 NOTE — FLOWSHEET NOTE
11/09/20 1915   Vitals   BP (!) 159/84   Temp 99 °F (37.2 °C)   Temp Source Oral   Pulse 105   Resp 16   Peritoneal Dialysis Catheter Left lower abdomen   Placement Date/Time: 10/31/20 0704   Catheter Location: Left lower abdomen   Status Accessed   Site Condition No Complications   Dressing Status Clean;Dry; Intact   Dressing Gauze   Cycler   Verification of Prescription CCPD   Total Volume Programmed 09934 mL   Therapy Time (Hours:Minutes) 9   Fill Volume 2000 mL   Last Fill Volume 2000 mL   Number of Cycles 5   Bag Volume 5000 mL   Number of Bags Used 3   Average Dwell Time (Hours:Minutes) 87 minutes   CCPD initiated using aseptic technique.  Pt tolerated well

## 2020-11-10 NOTE — PROGRESS NOTES
Patient requesting to shower. Discussed with IM resident during rounds if permitted to come off telemetry for shower. IM resident will discuss with attending.

## 2020-11-10 NOTE — PROGRESS NOTES
Dr. Luiza Rojo contacted regarding glucose of 580. Dr. Ben Cutler 10 units of regular insulin IV.  Will recheck BS at 0237

## 2020-11-10 NOTE — FLOWSHEET NOTE
CCPD complete with no reported issues. 500ml clear straw UF.      11/10/20 0730   Vitals   /78  (manual)   Temp 97.8 °F (36.6 °C)   Temp Source Temporal   Pulse 98   Resp 18   SpO2 98 %   Peritoneal Dialysis Catheter Left lower abdomen   Placement Date/Time: 10/31/20 0704   Catheter Location: Left lower abdomen   Status Deaccessed   Site Condition No Complications   Dressing Status Clean;Dry; Intact   Dressing Gauze   Cycler   Verification of Prescription CCPD   Total Volume Programmed 02759 mL   Therapy Time (Hours:Minutes) 8:52   Last Fill Volume 2000 mL   Ultrafiltration (UF) (mL) 513 mL   Lost Dwell Time (Hours:Minutes) 0:45

## 2020-11-10 NOTE — PROGRESS NOTES
Internal medicine not planning on discharge today. IM resident called and said she also made Dr. Radhika Albrecht aware since patient had a scheduled appointment tomorrow. Dialysis aware patient will be here for overnight peritoneal dialysis treatment. Dr. Savanna Sheikh placed vancomycin script in patient's soft chart.

## 2020-11-10 NOTE — PROGRESS NOTES
[facility]  Internal Medicine Residency Program  Progress Note - House Team 1    Patient:  Stephanie Self 31 y.o. female MRN: [id number]     Date of Service: 11/10/2020     CC: Fatigue and chest discomfort  Overnight events: K 5.3, replacement was held, given 10 units regular insulin, repeat K 3.8    Subjective     Patient was seen and examined this morning. She reports persistent right groin pain even after removal of stitches. She denies any erythema at the surgical site or any back pain. She has no other complaints today. Objective     Physical Exam:  · Vitals: /72 Comment: manual  Pulse 106   Temp 97.5 °F (36.4 °C) (Temporal)   Resp 18   Ht 5' 4\" (1.626 m)   Wt 139 lb 6.4 oz (63.2 kg)   LMP 07/01/2020   SpO2 98%   BMI 23.93 kg/m²     I & O - 24hr: I/O this shift:  In: 660 [P.O.:660]  · Out: 513    · General Appearance: alert, appears stated age and cooperative  · HEENT:  Head: Normal, normocephalic, atraumatic. · Neck: no carotid bruit and no JVD  · Lung: clear to auscultation bilaterally  · Heart: regular rate and rhythm, S1, S2 normal, no murmur, click, rub or gallop  · Abdomen: soft, non-tender; bowel sounds normal; no masses,  no organomegaly  · Extremities:  extremities normal, atraumatic, no cyanosis or edema, tenderness on the right groin area at the site of surgery without any discharge or erythema  · Musculokeletal: No joint swelling, no muscle tenderness. ROM normal in all joints of extremities.    · Neurologic: Mental status: Alert, oriented, thought content appropriate  Subject  Pertinent Labs & Imaging Studies   jorge alberto  CBC with Differential:    Lab Results   Component Value Date    WBC 8.9 11/10/2020    RBC 3.20 11/10/2020    HGB 9.3 11/10/2020    HCT 30.0 11/10/2020     11/10/2020    MCV 93.8 11/10/2020    MCH 29.1 11/10/2020    MCHC 31.0 11/10/2020    RDW 15.3 11/10/2020    NRBC 0.9 08/10/2020    SEGSPCT 67 06/27/2013    METASPCT 1.7 08/10/2020 LYMPHOPCT 26.0 11/10/2020    MONOPCT 7.2 11/10/2020    BASOPCT 1.0 11/10/2020    MONOSABS 0.64 11/10/2020    LYMPHSABS 2.31 11/10/2020    EOSABS 0.44 11/10/2020    BASOSABS 0.09 11/10/2020     CMP:    Lab Results   Component Value Date     11/10/2020    K 3.8 11/10/2020    K 3.7 10/11/2020     11/10/2020    CO2 26 11/10/2020    BUN 45 11/10/2020    CREATININE 7.5 11/10/2020    GFRAA 8 11/10/2020    LABGLOM 8 11/10/2020    GLUCOSE 119 11/10/2020    GLUCOSE 130 05/18/2012    PROT 5.1 11/10/2020    LABALBU 3.4 11/10/2020    LABALBU 4.1 05/18/2012    CALCIUM 8.9 11/10/2020    BILITOT <0.2 11/10/2020    ALKPHOS 146 11/10/2020    AST 11 11/10/2020    ALT <5 11/10/2020       Resident's Assessment and Plan     [address] [facility] Erica is a 31 y.o. female    1. 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  - Blood culture positive for staph epidermidis on 10/8  - YULISSA on 10/19 showed large irregular 2 x 1 cm mobile echogenic mass attached to the right atrium suggestive of vegetation or thrombus; large 1 x 1 cm irregular echogenic mass attached to the tip of the CVC suggestive of vegetation or thrombus  - YULISSA on 11/2 showed right atrial mobile mass 2.4 cm x 0.4 cm which appears attached to atrial septum at the junction of the SVA and RA; LVEF 60-65%  - Infected Mediport was removed  - s/p angiovac vegectomy 11/6  - Cardiology, ID, CTS following, appreciate recommendations  - Surgical tissue Gram stain negative, surgical culture no growth, anaerobic culture negative, blood culture negative to date  - Received vancomycin as needed per ID on 11/8  - Follow fungal cultures    2.   Type 1 diabetes mellitus, uncontrolled  - BG very labile, 134-592 today  - Noted to be receiving dextrose containing IVF during CCPD at night which could be explaining elevated BG during the night  - Endocrinology following, appreciate recommendations  - Adjust regimen to Lantus 7 units nightly, premeal Humalog 3 units premeals, modified sliding scale  - Continue to check blood glucose with meals and at bedtime  - BG goal 140 to 180 mg/dl, per Endocrinology    3. ESRD on PD  - Nephrology following, appreciate recommendations  - Continue CCPD  - Continue Bumex, metolazone, Cardizem per nephrology  - Continue EPO 5000 units 3 times weekly    5. Recent C. difficile infection  - C. difficile positive at [facility] prior to transfer to [facility]  - ID following, appreciate recommendations  - Continue oral vancomycin for total of 14 days (currently day 10)    4. History of hypertension  - BP today 138-144/78-80  - Continue hydralazine and Cardizem  - Can resume lisinopril at discharge per CTS  - Continue to monitor BP    6. History of hypothyroidism  - TSH 6, free T3 1.3, free T4 0.78  - Continue levothyroxine 75 mg daily    7.   History of substance abuse      Caden Perea MD, PGY-1  Attending physician: Dr. Hetal Kathleen

## 2020-11-10 NOTE — PROGRESS NOTES
Reason for consult: Uncontrolled type 1 DM    A1C: 6.2 (11/9/20)     [] Not available                 Patient states the following concerns/barriers to diabetes self-management:       [x] None       [] Medication cost   [] Food cost/availability          [] Reading  [] Hearing   [] Vision                  [] Work    [] Transportation  [] No insurance    [] Physical limitations    [] Other:              Diabetes survival packet provided to:   [x] Patient     [] Other:    Information reviewed:   Definition of diabetes   Target glucose ranges/A1C   Self-monitoring of blood glucose   Prevention/symptoms/treatment of hypo-/hyperglycemia   Medication adherence   The plate method/meal planning guidelines   The benefits of exercise and recommendations   Reducing the risk of chronic complications       Comment: Patient feeling better today. She states that her appetite has increased significantly to the point that she is hungry all the time, and it is even preventing her from sleeping. She was placed on Megace to increase her appetite, which per patient, was poor when she first was admitted. She states that she has had diabetes since age 17 and sees Dr. Godwin Joseph for her diabetes management. She was ordered an insulin pump and CGM by Dr. Godwin Joseph and states she filled out all required paperwork for them a few weeks ago, but never heard back. She is eager to have an insulin pump and CGM because her glucose control is very labile. She states she has about 2 to 3 hypoglycemic episodes per week and is symptomatic. She treats hypoglycemia with juice or glucose gel. Her A1C is currently 6.2%, but this could be skewed d/t her hypoglycemic episodes, as she also runs in the 400s at times. She recently was diagnosed with infective endocarditis, an infected tessio, and C. Diff, all of which could lead to worsening hyperglycemia. She eats three meals per day with a snack between lunch and dinner and at bedtime.  She states that she is proficient at carb-counting. She self-monitors her glucose levels at least 4 times per day and states she is compliant with her insulin regimen. She states her home insulin regimen included taking Lantus 12 units in the morning, Humalog 3 units TID meals plus s.s., and NPH 6 units with PD. She understands that her insulin is being adjusted daily while here and that she will have a follow up with Dr. Ramesh Holman in his office on 11/23. Diabetes medications reviewed (use, purpose, action, side effects): Lantus, Humalog, NPH     Evaluation/Plan/Recommendations:   Patient's understanding of diabetes:   []Poor   []Fair    [x]Good   [] Excellent     Outpatient diabetes education is recommended:   [x] Yes (MNT for insulin pump preparation)  [] No   [] As needed  Patient is interested in outpatient diabetes education:   [x] Yes    [] No    [] Unsure    Recommended:     [] Consult to social work; patient has no insurance or financial hardship      [] Script for glucometer and supplies (per preference of patient's insurance)               [x] Script for outpatient diabetes education/MNT     [x] Carbohydrate-controlled diet    [x] Follow up with Dr. Ramesh Holman (endocrinologist)     Thank you for this consult.     Trent Campbell, RN, BSN, [facility]

## 2020-11-10 NOTE — PROGRESS NOTES
ENDOCRINOLOGY PROGRESS NOTE      Date of admission: 10/31/2020  Date of service: 11/10/2020  Admitting physician: Joey Canada MD   Primary Care Physician: Carly Joshi MD  Consultant physician: Edmund Fernandez MD     Reason for the consultation:  Uncontrolled DM,labile BG    History of Present Illness: The history is provided by the patient. Accuracy of the patient data is excellent    [address] is a very pleasant 31 y.o. old female with PMH of Type I DM, ESRD on PD,HTN,hypothyroidism. Infective endocarditis listed below admitted to [facility] on 10/31/2020 because of increased fatigue, prior to admission recently diagnosed with infective endocarditis in [facility], transfer to [facility] but left [facility], during this admission patient underwent vegectomy on 11/6 per CTS, ID,nephrology and cardiology following, endocrine team was consulted for diabetes management and labile blood glucose. Prior to admission  The patient was diagnosed with type 1DM at the age 17. Prior to admission patient was on lantus 10 units in AM, pre-meal 3 units + SS, and NPH 5 units at night for PD, per patient taking 6 units . Patient reports hypoglycemic episodes. Associated with sweating, dizziness. Patient has not been eating consistent carbohydrate meals, self-blood glucose monitoring has been above goal prior to admission. In addition, patient has macrovascular but has microvascular complications. Has last had diabetic eye exam more than 1 years back reports hx of diabetic retinopath. She has PMH of hypothyroidism, on levothyroxine     Lab Results   Component Value Date    LABA1C 6.2 11/09/2020       After admission   While hospitalized, anti-diabetic regiment includes lantus 8 units , LDSS. Point of care glucose monitoring was reviewed and has been above goal. Appetite is good. Subjective: patient seen and examined this morning.   Inpatient diet:   Carb Restricted diet     Point of care glucose monitoring (Independently reviewed) Recent Labs     20  0812 20  0815 20  1217 20  1610 20  2027 20  2348 11/10/20  0228 11/10/20  0608   GLUMET <40* 124* 109* 260* >500* >500* 266* 134*       Past medical history:   Past Medical History:   Diagnosis Date    Acute congestive heart failure ([facility]     BC (acute kidney injury) ([facility] 10/1/2019    Cephalgia 10/9/2019    Chronic kidney disease     Depression     Diabetes mellitus ([facility]     Diabetic ketoacidosis ([facility] 2011    Hemod[facility] patient ([facility]     History of blood transfusion 2019    Hyperlipidemia 10/8/2020    Hypothyroidism 10/8/2020    Iron deficiency anemia 10/1/2019    MDRO (multiple drug resistant organisms) resistance     MRSA (methicillin resistant Staphylococcus aureus)     back wound abcess    Non compliance w medication regimen 3/30/2016    Other disorders of kidney and ureter     Pregnancy 3/30/2016    16 weeks    Severe pre-eclampsia in third trimester 2016       Past surgical history:  Past Surgical History:   Procedure Laterality Date    BACK SURGERY      abscess     SECTION      x2    CHOLECYSTECTOMY, LAPAROSCOPIC N/A 2019    CHOLECYSTECTOMY LAPAROSCOPIC performed by Osvaldo Garner MD at [address] N/A 2018    COLONOSCOPY WITH BIOPSY performed by Ayse Corea MD at [address] [place] [address] COLONOSCOPY N/A 2018    COLONOSCOPY WITH BIOPSY performed by Cleopatra Blanco MD at [address] [place] [address] ECHO COMPL W [address]  2012    EF 57%    ECHO COMPL W DOP COLOR FLOW  6/10/2013         EMBOLECTOMY N/A 2020    ANGIOJewell PEREZ, YULISSA -- REQS ROOM 3 performed by Richmond Bach MD at [address] [facility] N/A 2020    LAPAROSCOPIC INSERTION PERITONEAL DIALYSIS CATHETER performed by Boy Dorsey MD at [facility] ESOPHAGOGASTRODUODENOSCOPY TRANSORAL DIAGNOSTIC N/A 2018    EGD ESOPHAGOGASTRODUODENOSCOPY performed by Mike Toledo famotidine  20 mg Oral Daily     PRN Meds:   bisacodyl, 10 mg, Daily PRN  hydrALAZINE, 10 mg, Q4H PRN  labetalol, 10 mg, Q4H PRN  oxyCODONE-acetaminophen, 0.5 tablet, Q6H PRN    Or  oxyCODONE-acetaminophen, 1 tablet, Q6H PRN  sodium chloride flush, 10 mL, PRN  heparin flush, 1 mL, PRN  sodium chloride flush, 10 mL, PRN  acetaminophen, 650 mg, Q6H PRN    Or  acetaminophen, 650 mg, Q6H PRN  polyethylene glycol, 17 g, Daily PRN  promethazine, 12.5 mg, Q6H PRN    Or  ondansetron, 4 mg, Q6H PRN  glucose, 15 g, PRN  dextrose, 12.5 g, PRN  glucagon (rDNA), 1 mg, PRN  dextrose, 100 mL/hr, PRN  LORazepam, 0.5 mg, BID PRN      Continuous Infusions:   [Held by provider] dextrose 5 % and 0.45 % NaCl 100 mL/hr at 11/08/20 0825    dextrose Stopped (11/04/20 0852)       Review of Systems  All systems reviewed. All negative except for symptoms mentioned in HPI     OBJECTIVE    /78 Comment: manual  Pulse 98   Temp 97.8 °F (36.6 °C) (Temporal)   Resp 18   Ht 5' 4\" (1.626 m)   Wt 139 lb 6.4 oz (63.2 kg)   LMP 07/01/2020   SpO2 98%   BMI 23.93 kg/m²     Intake/Output Summary (Last 24 hours) at 11/10/2020 0852  Last data filed at 11/10/2020 0730  Gross per 24 hour   Intake 200 ml   Output 663 ml   Net -463 ml       Physical examination:  General: awake alert, oriented x3, no abnormal position or movements. HEENT: normocephalic non-traumatic, no exophthalmos   Neck: supple, no LN enlargement, no thyromegaly, no thyroid tenderness, no JVD. Pulm: Clear equal air entry no added sounds, no wheezing or rhonchi    CVS: S1 + S2, no murmur, no heave  Abd: soft lax, no tenderness, no organomegaly, audible bowel sounds. Skin: warm, no lesions, no rash. Feet: sensory exam of the feet is present, No ulcers or open wounds.  Good peripheral pulses  Neuro: CN intact, muscle power normal  Psych: normal mood, and affect    Review of Laboratory Data:  I personally reviewed the following labs:   Recent Labs     11/08/20  1790 11/09/20  0520 11/10/20  0527   WBC 11.7* 7.6 8.9   RBC 3.27* 3.34* 3.20*   HGB 9.7* 9.9* 9.3*   HCT 30.7* 31.5* 30.0*   MCV 93.9 94.3 93.8   MCH 29.7 29.6 29.1   MCHC 31.6* 31.4* 31.0*   RDW 15.8* 15.7* 15.3*    289 279   MPV 10.1 10.2 10.6     Recent Labs     11/08/20  0334 11/09/20 0520 11/09/20 0830 11/09/20  1600 11/09/20  2005 11/09/20  2030 11/10/20  0005 11/10/20  0527    139 143  --   --   --   --  142   K 2.9* 3.5 2.9* 5.3* 5.7*  --   --  3.8    99 103  --   --   --   --  102   CO2 22 23 23  --   --   --   --  26   BUN 32* 37* 39*  --   --   --   --  45*   CREATININE 7.9* 7.3* 7.4*  --   --   --   --  7.5*   GLUCOSE 20* 371* 65*  --   --  592* 580* 119*   CALCIUM 9.1 8.5* 8.8  --   --   --   --  8.9   PROT 5.6* 5.3*  --   --   --   --   --  5.1*   LABALBU 3.8 3.7  --   --   --   --   --  3.4*   BILITOT <0.2 <0.2  --   --   --   --   --  <0.2   ALKPHOS 115* 142*  --   --   --   --   --  146*   AST 39* 12  --   --   --   --   --  11   ALT <5 <5  --   --   --   --   --  <5     Beta-Hydroxybutyrate   Date Value Ref Range Status   11/07/2020 0.11 0.02 - 0.27 mmol/L Final   10/31/2020 0.56 (H) 0.02 - 0.27 mmol/L Final   10/08/2020 0.17 0.02 - 0.27 mmol/L Final     Lab Results   Component Value Date    LABA1C 6.2 11/09/2020    LABA1C 8.6 07/19/2020    LABA1C 8.8 01/23/2020     Lab Results   Component Value Date/Time    TSH 6.000 (H) 10/31/2020 07:47 AM    T4FREE 0.78 (L) 10/31/2020 10:43 AM    Y8VGNEV 8.6 11/05/2015 02:20 AM    FT3 1.3 (L) 10/31/2020 10:43 AM    Q8FIFWV 36.73 (L) 07/20/2020 02:00 PM     Lab Results   Component Value Date    LABA1C 6.2 11/09/2020    GLUCOSE 119 11/10/2020    GLUCOSE 130 05/18/2012    MALBCR 2949.3 01/15/2020    LABMICR 1740.1 01/15/2020    LABCREA 59 01/15/2020    LABCREA 61 01/15/2020     Lab Results   Component Value Date    TRIG 82 01/14/2020    HDL 33 01/14/2020    LDLCALC 46 01/14/2020    CHOL 95 01/14/2020       Blood culture   Lab Results   Component Value Date    BC 24 Hours no growth 11/07/2020    BC 5 Days no growth 10/31/2020       Radiology:  XR CHEST PORTABLE   Final Result   No definite new tubing is visualized radiographically, except for small IV   site in right upper arm. New linear densities in lateral left lung base may reflect atelectasis versus   or early infiltrate      Slight cardiomegaly without vascular congestion         XR CHEST PORTABLE   Final Result   Mild areas of patchy opacities in the bilateral lower lungs which could   represent atelectasis versus airspace disease process. Mild cardiomegaly. Medical Records/Labs/Images review:   I personally reviewed and summarized previous records   All labs and imaging were reviewed independently     [address], a 31 y.o.-old female seen today for inpatient diabetes management     Diabetes Mellitus type I ,  · Patient's diabetes is uncontrolled, labile blood glucose. · For now, will change diabetes regimen to  · Lantus 7 units at night  · Premeal humalog 2 units with meal  · Modified sliding scale 1u:100>180 with meals and at bedtime  · 670g Medtronic insulin pump with CGM was approved and shipped. Plan to start insulin pump training few days after discharge    · Continue glucose check with meals and at bedtime   · Will titrate insulin dose based on the blood glucose trend & insulin requirement  · Pt will follow with us shortly after discharge.  Endocrine follow up visit, Monday 11/23 at 9:00AM     Hypothyroidism  · Ct levothyroxine 75 mcg daily (dose was recently adjusted from 50 mcg/day as TSH was 6.0)     ESRD  · continue CCPD , nephrology following    Infective Endocarditis s/p vegectomy  · Management per ID, CTS, cardiology    Interdisciplinary plan for communication with healthcare providers:   Consult recommendations were discussed with the Primary Service/Nursing staff      The above issues were reviewed with the patient who understood and agreed with the plan. Wallace Carcamo PGY 3  Internal Medicine Resident    Thank you for allowing us to participate in the care of this patient. Please do not hesitate to contact us with any additional questions. Dewitte Alpers MD  Endocrinologist, [facility] Diabetes Care and Endocrinology   [address] [facility] [address]   Phone: [phone]  Fax: [phone]  ---------------------------  An electronic signature was used to authenticate this note.  Scott Constantino MD on 11/10/2020 at 6:06 PM

## 2020-11-10 NOTE — CARE COORDINATION
SOCIAL WORK/CASEMANAGEMENT TRANSITION OF CARE [id number] [facility], [address], [place], [phone]): renal signed off, pt to follow up with endocrinology in dec and ID wrote for iv abx every 7 days for two weeks. [address] said they cannot do hhc for iv abx only 2 x's and to set up thru the infusion center. I called [facility] infusion center at [phone] fax [phone] and faxed chart information requested with script. They will call pt in a.m. at home tomorrow to set up time to come in for the first dose. Jahaira Wells  11/10/2020    rn and I went over iv abx's for pt with her at [facility] infusion [place]. However, rn to go over it with the mother when she comes to pick pt up if goes today.  Jahaira Wells  11/10/2020

## 2020-11-10 NOTE — PROGRESS NOTES
Spoke with Dr. Ramesh Holman who has made appointment to see patient tomorrow in office at 1pm and patient is aware of appointment. Per Dr. Ramesh Holman, hold the 2 units of humalog with dinner and patient can be discharged this evening after eating dinner & blood glucose is over 100.

## 2020-11-11 ENCOUNTER — HOSPITAL ENCOUNTER (OUTPATIENT)
Dept: INFUSION THERAPY | Age: 30
Setting detail: INFUSION SERIES
End: 2020-11-11
Payer: COMMERCIAL

## 2020-11-11 VITALS
BODY MASS INDEX: 23.75 KG/M2 | TEMPERATURE: 98.2 F | DIASTOLIC BLOOD PRESSURE: 93 MMHG | HEIGHT: 64 IN | HEART RATE: 110 BPM | OXYGEN SATURATION: 98 % | RESPIRATION RATE: 18 BRPM | SYSTOLIC BLOOD PRESSURE: 154 MMHG | WEIGHT: 139.1 LBS

## 2020-11-11 LAB
ALBUMIN SERPL-MCNC: 3.5 G/DL (ref 3.5–5.2)
ALP BLD-CCNC: 107 U/L (ref 35–104)
ALT SERPL-CCNC: <5 U/L (ref 0–32)
ANAEROBIC CULTURE: NORMAL
ANION GAP SERPL CALCULATED.3IONS-SCNC: 16 MMOL/L (ref 7–16)
AST SERPL-CCNC: 12 U/L (ref 0–31)
BASOPHILS ABSOLUTE: 0.1 E9/L (ref 0–0.2)
BASOPHILS RELATIVE PERCENT: 1.7 % (ref 0–2)
BILIRUB SERPL-MCNC: <0.2 MG/DL (ref 0–1.2)
BUN BLDV-MCNC: 46 MG/DL (ref 6–20)
CALCIUM SERPL-MCNC: 9.1 MG/DL (ref 8.6–10.2)
CHLORIDE BLD-SCNC: 101 MMOL/L (ref 98–107)
CO2: 24 MMOL/L (ref 22–29)
CREAT SERPL-MCNC: 7.5 MG/DL (ref 0.5–1)
EOSINOPHILS ABSOLUTE: 0.45 E9/L (ref 0.05–0.5)
EOSINOPHILS RELATIVE PERCENT: 7.5 % (ref 0–6)
GFR AFRICAN AMERICAN: 8
GFR NON-AFRICAN AMERICAN: 8 ML/MIN/1.73
GLUCOSE BLD-MCNC: 131 MG/DL (ref 74–99)
HCT VFR BLD CALC: 30.8 % (ref 34–48)
HEMOGLOBIN: 9.7 G/DL (ref 11.5–15.5)
IMMATURE GRANULOCYTES #: 0.02 E9/L
IMMATURE GRANULOCYTES %: 0.3 % (ref 0–5)
LYMPHOCYTES ABSOLUTE: 2.2 E9/L (ref 1.5–4)
LYMPHOCYTES RELATIVE PERCENT: 36.8 % (ref 20–42)
MAGNESIUM: 2 MG/DL (ref 1.6–2.6)
MCH RBC QN AUTO: 29.7 PG (ref 26–35)
MCHC RBC AUTO-ENTMCNC: 31.5 % (ref 32–34.5)
MCV RBC AUTO: 94.2 FL (ref 80–99.9)
METER GLUCOSE: 133 MG/DL (ref 74–99)
METER GLUCOSE: 264 MG/DL (ref 74–99)
METER GLUCOSE: 287 MG/DL (ref 74–99)
METER GLUCOSE: 361 MG/DL (ref 74–99)
METER GLUCOSE: 67 MG/DL (ref 74–99)
METER GLUCOSE: 80 MG/DL (ref 74–99)
MONOCYTES ABSOLUTE: 0.46 E9/L (ref 0.1–0.95)
MONOCYTES RELATIVE PERCENT: 7.7 % (ref 2–12)
NEUTROPHILS ABSOLUTE: 2.75 E9/L (ref 1.8–7.3)
NEUTROPHILS RELATIVE PERCENT: 46 % (ref 43–80)
PDW BLD-RTO: 15.9 FL (ref 11.5–15)
PHOSPHORUS: 6.3 MG/DL (ref 2.5–4.5)
PLATELET # BLD: 303 E9/L (ref 130–450)
PMV BLD AUTO: 10 FL (ref 7–12)
POTASSIUM SERPL-SCNC: 4 MMOL/L (ref 3.5–5)
RBC # BLD: 3.27 E12/L (ref 3.5–5.5)
SODIUM BLD-SCNC: 141 MMOL/L (ref 132–146)
TOTAL PROTEIN: 5.2 G/DL (ref 6.4–8.3)
WBC # BLD: 6 E9/L (ref 4.5–11.5)

## 2020-11-11 PROCEDURE — 80053 COMPREHEN METABOLIC PANEL: CPT

## 2020-11-11 PROCEDURE — 2580000003 HC RX 258: Performed by: INTERNAL MEDICINE

## 2020-11-11 PROCEDURE — 36592 COLLECT BLOOD FROM PICC: CPT

## 2020-11-11 PROCEDURE — 6360000002 HC RX W HCPCS: Performed by: INTERNAL MEDICINE

## 2020-11-11 PROCEDURE — 36415 COLL VENOUS BLD VENIPUNCTURE: CPT

## 2020-11-11 PROCEDURE — 2580000003 HC RX 258: Performed by: NURSE PRACTITIONER

## 2020-11-11 PROCEDURE — 6360000002 HC RX W HCPCS: Performed by: NURSE PRACTITIONER

## 2020-11-11 PROCEDURE — 84100 ASSAY OF PHOSPHORUS: CPT

## 2020-11-11 PROCEDURE — 6370000000 HC RX 637 (ALT 250 FOR IP): Performed by: INTERNAL MEDICINE

## 2020-11-11 PROCEDURE — 6370000000 HC RX 637 (ALT 250 FOR IP): Performed by: NURSE PRACTITIONER

## 2020-11-11 PROCEDURE — 85025 COMPLETE CBC W/AUTO DIFF WBC: CPT

## 2020-11-11 PROCEDURE — 99231 SBSQ HOSP IP/OBS SF/LOW 25: CPT | Performed by: INTERNAL MEDICINE

## 2020-11-11 PROCEDURE — 83735 ASSAY OF MAGNESIUM: CPT

## 2020-11-11 PROCEDURE — 82962 GLUCOSE BLOOD TEST: CPT

## 2020-11-11 RX ORDER — INSULIN GLARGINE 100 [IU]/ML
6 INJECTION, SOLUTION SUBCUTANEOUS NIGHTLY
Qty: 1 VIAL | Refills: 3 | Status: SHIPPED
Start: 2020-11-11 | End: 2020-11-20 | Stop reason: DRUGHIGH

## 2020-11-11 RX ORDER — LEVOTHYROXINE SODIUM 0.07 MG/1
75 TABLET ORAL
Qty: 30 TABLET | Refills: 3 | Status: SHIPPED | OUTPATIENT
Start: 2020-11-12 | End: 2021-03-15

## 2020-11-11 RX ORDER — DIPHENHYDRAMINE HYDROCHLORIDE 50 MG/ML
50 INJECTION INTRAMUSCULAR; INTRAVENOUS ONCE
Status: COMPLETED | OUTPATIENT
Start: 2020-11-11 | End: 2020-11-11

## 2020-11-11 RX ADMIN — GENTAMICIN SULFATE: 1 CREAM TOPICAL at 07:57

## 2020-11-11 RX ADMIN — INSULIN LISPRO 1 UNITS: 100 INJECTION, SOLUTION INTRAVENOUS; SUBCUTANEOUS at 11:00

## 2020-11-11 RX ADMIN — VANCOMYCIN HYDROCHLORIDE 1000 MG: 1 INJECTION, POWDER, LYOPHILIZED, FOR SOLUTION INTRAVENOUS at 16:49

## 2020-11-11 RX ADMIN — INSULIN LISPRO 2 UNITS: 100 INJECTION, SOLUTION INTRAVENOUS; SUBCUTANEOUS at 16:12

## 2020-11-11 RX ADMIN — DIPHENHYDRAMINE HYDROCHLORIDE 50 MG: 50 INJECTION, SOLUTION INTRAMUSCULAR; INTRAVENOUS at 09:34

## 2020-11-11 RX ADMIN — HEPARIN SODIUM 5000 UNITS: 10000 INJECTION INTRAVENOUS; SUBCUTANEOUS at 14:55

## 2020-11-11 RX ADMIN — EPOETIN ALFA-EPBX 5000 UNITS: 10000 INJECTION, SOLUTION INTRAVENOUS; SUBCUTANEOUS at 07:58

## 2020-11-11 RX ADMIN — LEVOTHYROXINE SODIUM 75 MCG: 0.07 TABLET ORAL at 06:41

## 2020-11-11 RX ADMIN — Medication 10 ML: at 07:59

## 2020-11-11 RX ADMIN — ALUMINUM HYDROXIDE 320 MG: 320 LIQUID ORAL at 11:12

## 2020-11-11 RX ADMIN — ALUMINUM HYDROXIDE 320 MG: 320 LIQUID ORAL at 16:13

## 2020-11-11 RX ADMIN — FAMOTIDINE 20 MG: 20 TABLET ORAL at 07:54

## 2020-11-11 RX ADMIN — HYDRALAZINE HYDROCHLORIDE 25 MG: 25 TABLET, FILM COATED ORAL at 06:41

## 2020-11-11 RX ADMIN — SODIUM CHLORIDE, PRESERVATIVE FREE 100 UNITS: 5 INJECTION INTRAVENOUS at 07:59

## 2020-11-11 RX ADMIN — DILTIAZEM HYDROCHLORIDE 240 MG: 240 CAPSULE, EXTENDED RELEASE ORAL at 07:55

## 2020-11-11 RX ADMIN — BUMETANIDE 2 MG: 1 TABLET ORAL at 07:55

## 2020-11-11 RX ADMIN — OXYCODONE AND ACETAMINOPHEN 1 TABLET: 5; 325 TABLET ORAL at 03:05

## 2020-11-11 RX ADMIN — INSULIN LISPRO 1 UNITS: 100 INJECTION, SOLUTION INTRAVENOUS; SUBCUTANEOUS at 09:36

## 2020-11-11 RX ADMIN — MEGESTROL ACETATE 200 MG: 40 SUSPENSION ORAL at 07:56

## 2020-11-11 RX ADMIN — METOLAZONE 5 MG: 5 TABLET ORAL at 07:56

## 2020-11-11 RX ADMIN — ALUMINUM HYDROXIDE 320 MG: 320 LIQUID ORAL at 07:54

## 2020-11-11 RX ADMIN — HYDRALAZINE HYDROCHLORIDE 25 MG: 25 TABLET, FILM COATED ORAL at 14:55

## 2020-11-11 RX ADMIN — OXYCODONE AND ACETAMINOPHEN 1 TABLET: 5; 325 TABLET ORAL at 13:31

## 2020-11-11 ASSESSMENT — PAIN SCALES - GENERAL
PAINLEVEL_OUTOF10: 0
PAINLEVEL_OUTOF10: 4
PAINLEVEL_OUTOF10: 8
PAINLEVEL_OUTOF10: 7

## 2020-11-11 NOTE — PROGRESS NOTES
CLINICAL PHARMACY NOTE: MEDS TO [address] Select Patient?: No  Total # of Prescriptions Filled: 3   The following medications were delivered to the patient:  · Levothyroxine 75  · Insulin lispro  · lantus 100  Total # of Interventions Completed: 3  Time Spent (min): 30    Additional Documentation:    Delivered to rn, putting insulin in fridge

## 2020-11-11 NOTE — PROGRESS NOTES
[address] [facility]  Internal Medicine Residency / [address]    Attending Physician Statement  I have discussed the case, including pertinent history and exam findings with the resident and the team.  I have seen and examined the patient and the key elements of the encounter have been performed by me. I agree with the assessment, plan and orders as documented by the resident. BS control improving  May be a candidate for insulin pump  More active at home chasing young children  Has a large support system at home   Peritoneal dialysis to continue   Discharge planning       Remainder of medical problems as per resident note.       Brayan John MD FRCP([facility])  Internal Medicine Residency Faculty

## 2020-11-11 NOTE — DISCHARGE SUMMARY
[address] [place] [address]  Department of Internal Medicine   Internal Medicine Residency Program   Discharge Summary    PCP: Bill Garcia MD    House Team: 1    Admit Date:10/31/2020  Discharge Date:11/11/2020    Admission Diagnosis:  1. 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 Type 1 diabetes mellitus, uncontrolled  3. ESRD on PD  4. Recent C. difficile infection  5. History of hypertension  6. History of hypothyroidism  7. History of substance abuse    Discharge Diagnosis:  1. 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 Type 1 diabetes mellitus, uncontrolled  3. ESRD on PD  4. Recent C. difficile infection, resolved  5. History of hypertension  6. History of hypothyroidism  7. History of substance abuse    Hospital Course:  Romel Oneill is a 82-year-old female with past medical history of ESRD on PD, type I DM, hypertension, hypothyroidism, recent history of C. difficile infection, illicit drug abuse, who was admitted for concerns of chest pain.       She developed staph epidermidis bacteremia 10/8 and was seen at [facility]. YULISSA showed large irregular 2 cm x 1 cm mobile mass attached to the right atrial side of the interatrial septum near SVC opening suggestive of vegetation or thrombosis. Large 1 cm x 1 cm irregular echogenic mass attached to the tip of the central venous catheter suggestive of vegetation or thrombosis. She was transferred to [facility] and subsequently left his [facility].     Repeat YULISSA during this admission revealed a right atrial mobile mass approximately 2.4 cm x 0.4 cm attached to the right atrium septum at the junction of the SVC and RA, no evidence of valvular vegetation. Cardiology, ID, CTS were consulted. IV vancomycin was started. She underwent angiovac vegectomy on 11/6. Surgical tissue culture no growth, anaerobic culture negative.   Follow blood culture and fungal 10/13/2020  EXAMINATION: THREE XRAY VIEWS OF THE LEFT HAND 10/12/2020 2:21 pm COMPARISON: None. HISTORY: ORDERING SYSTEM PROVIDED HISTORY: pain after fall TECHNOLOGIST PROVIDED HISTORY: Reason for exam:->pain after fall FINDINGS: The bone mineralization is within normal limits. The joint spaces appear unremarkable. There is a vague lucency noted involving the 5th metacarpal neck with slight volar angulation. No other fractures or dislocations are seen. 1. Question acute traumatic nondisplaced fracture involving the 5th metacarpal neck. Xr Knee Left (3 Views)    Result Date: 10/12/2020  EXAMINATION: THREE XRAY VIEWS OF THE LEFT KNEE 10/12/2020 2:21 pm COMPARISON: None. HISTORY: ORDERING SYSTEM PROVIDED HISTORY: pain and effusion after fall TECHNOLOGIST PROVIDED HISTORY: Reason for exam:->pain and effusion after fall FINDINGS: The bone mineralization is within normal limits. The joint spaces appear unremarkable. No acute fractures or dislocations are seen. There is no soft tissue swelling. No bony erosions are seen. 1. No acute abnormality involving the left knee. Ct Head Wo Contrast    Result Date: 10/20/2020  EXAMINATION: CT OF THE HEAD WITHOUT CONTRAST  10/20/2020 6:25 pm TECHNIQUE: CT of the head was performed without the administration of intravenous contrast. Dose modulation, iterative reconstruction, and/or weight based adjustment of the mA/kV was utilized to reduce the radiation dose to as low as reasonably achievable. COMPARISON: CT head from October 13, 2020 HISTORY: ORDERING SYSTEM PROVIDED HISTORY: Decreased LOC TECHNOLOGIST PROVIDED HISTORY: Reason for exam:->Decreased LOC Has a \"code stroke\" or \"stroke alert\" been called? ->No FINDINGS: BRAIN/VENTRICLES: There is no acute intracranial hemorrhage, mass effect or midline shift. No abnormal extra-axial fluid collection. The gray-white differentiation is maintained without evidence of an acute infarct.   There is no evidence of hydrocephalus. ORBITS: The visualized portion of the orbits demonstrate no acute abnormality. SINUSES: The visualized paranasal sinuses and mastoid air cells demonstrate no acute abnormality. SOFT TISSUES/SKULL:  No acute abnormality of the visualized skull or soft tissues. No acute intracranial abnormality. Ct Head Wo Contrast    Result Date: 10/13/2020  EXAMINATION: CT OF THE HEAD WITHOUT CONTRAST  10/13/2020 11:10 am TECHNIQUE: CT of the head was performed without the administration of intravenous contrast. Dose modulation, iterative reconstruction, and/or weight based adjustment of the mA/kV was utilized to reduce the radiation dose to as low as reasonably achievable. COMPARISON: 02/04/2020. HISTORY: ORDERING SYSTEM PROVIDED HISTORY: altered mental status TECHNOLOGIST PROVIDED HISTORY: Reason for exam:->altered mental status Has a \"code stroke\" or \"stroke alert\" been called? ->No FINDINGS: BRAIN/VENTRICLES: There is no acute intracranial hemorrhage, mass effect or midline shift. No abnormal extra-axial fluid collection. The gray-white differentiation is maintained without evidence of an acute infarct. There is no evidence of hydrocephalus. ORBITS: The visualized portion of the orbits demonstrate no acute abnormality. SINUSES: The visualized paranasal sinuses and mastoid air cells demonstrate no acute abnormality. SOFT TISSUES/SKULL:  No acute abnormality of the visualized skull or soft tissues. 1.No acute intracranial abnormality. Xr Chest Portable    Result Date: 11/6/2020  EXAMINATION: ONE XRAY VIEW OF THE CHEST 11/6/2020 2:43 pm COMPARISON: 10/31/2020 HISTORY: ORDERING SYSTEM PROVIDED HISTORY: evaluate lung fields, tube placement TECHNOLOGIST PROVIDED HISTORY: Reason for exam:->evaluate lung fields, tube placement What reading provider will be dictating this exam?->CRC FINDINGS: Cardiac silhouette size slightly enlarged. No evidence of vascular congestion.  No radiographically visible pneumothorax or pleural effusion. No acute displaced fracture. Upper abdominal surgical clips are suggestive of prior cholecystectomy. Linear scar unchanged in right lung apex. Right lung otherwise clear. New linear densities in left lung base may reflect atelectasis versus early infiltrate. No definite new tubing is visualized radiographically. No definite new tubing is visualized radiographically, except for small IV site in right upper arm. New linear densities in lateral left lung base may reflect atelectasis versus or early infiltrate Slight cardiomegaly without vascular congestion     Xr Chest Portable    Result Date: 10/31/2020  EXAMINATION: ONE XRAY VIEW OF THE CHEST 10/31/2020 3:02 am COMPARISON: Chest x-ray from 10/13/2020 HISTORY: ORDERING SYSTEM PROVIDED HISTORY: Possible sepsis TECHNOLOGIST PROVIDED HISTORY: Reason for exam:->Possible sepsis What reading provider will be dictating this exam?->CRC FINDINGS: Mild areas of patchy opacities are noted in the bilateral lower lungs which could represent atelectasis versus mild/early airspace disease process. Mild cardiomegaly. No evidence of pleural effusion. Visualized osseous structures are unremarkable. Mild areas of patchy opacities in the bilateral lower lungs which could represent atelectasis versus airspace disease process. Mild cardiomegaly. Xr Chest Portable    Result Date: 10/13/2020  EXAMINATION: ONE XRAY VIEW OF THE CHEST 10/13/2020 12:22 pm COMPARISON: 10/08/2020. HISTORY: ORDERING SYSTEM PROVIDED HISTORY: Cough, concern for aspiration TECHNOLOGIST PROVIDED HISTORY: Reason for exam:->concern for aspiration FINDINGS: The heart size is within normal limits. The pulmonary vasculature is also within normal limits. No acute infiltrates are seen. The costophrenic angles are sharp bilaterally. No pneumothoraces are noted. There is a left chest wall MediPort with the tip in the superior vena cava. 1. No active pulmonary disease.      Mri take  · when to take this         * This list has 2 medication(s) that are the same as other medications prescribed for you. Read the directions carefully, and ask your doctor or other care provider to review them with you. CONTINUE taking these medications    atorvastatin 40 MG tablet  Commonly known as:  LIPITOR  Take 1 tablet by mouth nightly     blood glucose test strips strip  Commonly known as:  ASCENSIA AUTODISC VI;ONE TOUCH ULTRA TEST VI  Indications: 5x a day Freestyle Lite -Tests 5 times daily and as needed for low glucose.   E10.10     cloNIDine 0.1 MG tablet  Commonly known as:  CATAPRES     dilTIAZem 240 MG extended release capsule  Commonly known as:  CARDIZEM CD  Take 1 capsule by mouth daily     epoetin nena-epbx 4000 UNIT/ML Soln injection  Commonly known as:  RETACRIT  Inject 2 mLs into the skin three times a week Per Nephrology     glucose 40 % Gel  Commonly known as:  GLUTOSE  Take 15 g by mouth as needed     heparin (porcine) 100 UNIT/ML Soln infusion  As per nomogram     INSULIN SYRINGE .5CC/30GX1/2\" 30G X 1/2\" 0.5 ML Misc     Insulin Syringes (Disposable) U-100 0.3 ML Misc  To use daily     lactobacillus Caps capsule  Take 1 capsule by mouth every 12 hours     Lancets Thin Misc  Indications: cks 5xa a day cks 5xa a day     LORazepam 0.5 MG tablet  Commonly known as:  ATIVAN     metOLazone 5 MG tablet  Commonly known as:  ZAROXOLYN     RenaPlex-D Tabs     vitamin D 1.25 MG (40464 UT) Caps capsule  Commonly known as:  ERGOCALCIFEROL           Where to Get Your Medications      These medications were sent to [facility] [facility] [address]    Phone:  [phone]   · insulin glargine 100 UNIT/ML injection vial  · insulin lispro 100 UNIT/ML injection vial  · insulin lispro 100 UNIT/ML injection vial  · levothyroxine 75 MCG tablet     You can get these medications from any pharmacy Bring a paper prescription for each of these medications  · vancomycin  infusion         Activity: activity as tolerated  Diet: diabetic diet    Follow-up appointments:  · Internal Medicine Ambulatory Clinic on 11/19 9 am. Call [phone] ([address]  Internal Medicine Clinic) if there are any appointment changes or other issues. It is very important that you keep this appointment.   · Follow-up with Endocrinology as scheduled (11/23 9 am)    Patient Instructions:   · Be compliant with medications  · Comply with new insulin regimen; Lantus 6 units nightly, Humalog 2 units premeals, modified sliding scale per Endocrinology  · Follow sliding scale:  **Corrective Low Dose Algorithm**  · Glucose: Dose:  ·             No Insulin  · 180-280 1 Unit  · 281-380 2 Units  · 381-480 3 Units  · Over 480 4 Units  · Continue vancomycin as prescribed by Infectious Disease  · Continue peritoneal dialysis       Darylene Barn M.D., PGY - 1   1:08 PM  11/11/2020    Attending physician: Dr. Kumar Reece

## 2020-11-11 NOTE — PROGRESS NOTES
ENDOCRINOLOGY PROGRESS NOTE      Date of admission: 10/31/2020  Date of service: 11/11/2020  Admitting physician: Rick Marin MD   Primary Care Physician: Prince Patricio MD  Consultant physician: Kris Wade MD     Reason for the consultation:  Uncontrolled DM,labile BG    History of Present Illness: The history is provided by the patient. Accuracy of the patient data is excellent    [address] Maldonado is a very pleasant 31 y.o. old female with PMH of Type I DM, ESRD on PD,HTN,hypothyroidism. Infective endocarditis listed below admitted to [facility] on 10/31/2020 because of increased fatigue, prior to admission recently diagnosed with infective endocarditis in [facility], transfer to [facility] but left [facility], during this admission patient underwent vegectomy on 11/6 per CTS, ID,nephrology and cardiology following, endocrine team was consulted for diabetes management and labile blood glucose. Prior to admission  The patient was diagnosed with type 1DM at the age 17. Prior to admission patient was on lantus 10 units in AM, pre-meal 3 units + SS, and NPH 5 units at night for PD, per patient taking 6 units . Patient reports hypoglycemic episodes. Associated with sweating, dizziness. Patient has not been eating consistent carbohydrate meals, self-blood glucose monitoring has been above goal prior to admission. In addition, patient has macrovascular but has microvascular complications. Has last had diabetic eye exam more than 1 years back reports hx of diabetic retinopath. She has PMH of hypothyroidism, on levothyroxine     Lab Results   Component Value Date    LABA1C 6.2 11/09/2020       After admission   While hospitalized, anti-diabetic regiment includes lantus 8 units , LDSS. Point of care glucose monitoring was reviewed and has been above goal. Appetite is good. Subjective: patient seen and examined this morning.   Inpatient diet:   Carb Restricted diet     Point of care glucose monitoring (Independently reviewed) Recent Labs     11/10/20  1521 11/10/20  1538 11/10/20  1601 11/10/20  1721 11/10/20  2002 20  0307 20  0326 20  0614   GLUMET 51* 50* 88 145* 264* 79* 80 133*       Past medical history:   Past Medical History:   Diagnosis Date    [facility] failure [facility]     BC (acute kidney injury) ([facility] 10/1/2019    Cephalgia 10/9/2019    Chronic kidney disease     Depression     Diabetes mellitus ([facility]     Diabetic ketoacidosis ([facility] 2011    Hemodialysis patient ([facility]     History of blood transfusion 2019    Hyperlipidemia 10/8/2020    Hypothyroidism 10/8/2020    Iron deficiency anemia 10/1/2019    MDRO (multiple drug resistant organisms) resistance     MRSA (methicillin resistant Staphylococcus aureus)     back wound abcess    Non compliance w medication regimen 3/30/2016    Other disorders of kidney and ureter     Pregnancy 3/30/2016    16 weeks    Severe pre-eclampsia in third trimester 2016       Past surgical history:  Past Surgical History:   Procedure Laterality Date    BACK SURGERY      abscess     SECTION      x2    CHOLECYSTECTOMY, LAPAROSCOPIC N/A 2019    CHOLECYSTECTOMY LAPAROSCOPIC performed by Osvaldo Garner MD at [address] N/A 2018    COLONOSCOPY WITH BIOPSY performed by Ayse Corea MD at [address] [place] [address] COLONOSCOPY N/A 2018    COLONOSCOPY WITH BIOPSY performed by Cleopatra Blanco MD at [address] [place] [address] ECHO COMPL [address]  2012    EF 57%    ECHO COMPL W DOP COLOR FLOW  6/10/2013         EMBOLECTOMY N/A 2020    [address], [place] Mike, YULISSA -- REQS ROOM 3 performed by Richmond Bach MD at [facility] N/A 2020    LAPAROSCOPIC INSERTION PERITONEAL DIALYSIS CATHETER performed by Boy Dorsey MD at [facility] ESOPHAGOGASTRODUODENOSCOPY TRANSORAL DIAGNOSTIC N/A 2018    EGD ESOPHAGOGASTRODUODENOSCOPY performed by Ayse Corea MD at [address] [facility] TRANSESOPHAGEAL ECHOCARDIOGRAM N/A 10/19/2020    TRANSESOPHAGEAL ECHOCARDIOGRAM WITH BUBBLE STUDY performed by Cuco Elias MD at [address] [facility] TUNNELED VENOUS PORT PLACEMENT  04/2018    UPPER GASTROINTESTINAL ENDOSCOPY  12/18/2018    EGD BIOPSY performed by Norberto Dolan MD at [address] [facility] N/A 10/11/2019    EGD ESOPHAGOGASTRODUODENOSCOPY performed by Shirley Figueroa DO at [address] history:   Tobacco:   reports that she has been smoking cigarettes. She has a 3.00 pack-year smoking history. She uses smokeless tobacco.  Alcohol:   reports no history of alcohol use. Drugs:   reports no history of drug use. Family history:    Family History   Problem Relation Age of Onset   Jose L.Baptise Asthma Mother     Hypertension Mother     High Blood Pressure Mother     Diabetes Mother     Asthma Brother     High Blood Pressure Father        Allergy and drug reactions:    Allergies   Allergen Reactions    Toradol [Ketorolac Tromethamine] Anaphylaxis and Hives       Scheduled Meds:   diphenhydrAMINE  50 mg Intravenous Once    insulin lispro  0-3 Units Subcutaneous Once    insulin lispro  2 Units Subcutaneous TID WC    insulin glargine  7 Units Subcutaneous Nightly    insulin lispro  0-4 Units Subcutaneous 4x Daily AC & HS    megestrol  200 mg Oral Daily    aluminum hydroxide  320 mg Oral TID WC    heparin (porcine)  5,000 Units Subcutaneous 3 times per day    lidocaine  1 patch Transdermal Daily    senna  1 tablet Oral Nightly    levothyroxine  75 mcg Oral QAM AC    dilTIAZem  240 mg Oral Daily    bumetanide  2 mg Oral BID    metOLazone  5 mg Oral Daily    hydrALAZINE  25 mg Oral 3 times per day    heparin flush  1 mL Intravenous 2 times per day    vancomycin  250 mg Oral 4 times per day    calcium gluconate  1 g Intravenous Once    sodium chloride flush  10 mL Intravenous 2 times per day    gentamicin   Topical Daily    epoetin nena-epbx  5,000 Units Subcutaneous Once per day on Mon Wed Fri    famotidine  20 mg Oral Daily     PRN Meds:   bisacodyl, 10 mg, Daily PRN  hydrALAZINE, 10 mg, Q4H PRN  labetalol, 10 mg, Q4H PRN  oxyCODONE-acetaminophen, 0.5 tablet, Q6H PRN    Or  oxyCODONE-acetaminophen, 1 tablet, Q6H PRN  sodium chloride flush, 10 mL, PRN  heparin flush, 1 mL, PRN  sodium chloride flush, 10 mL, PRN  acetaminophen, 650 mg, Q6H PRN    Or  acetaminophen, 650 mg, Q6H PRN  polyethylene glycol, 17 g, Daily PRN  promethazine, 12.5 mg, Q6H PRN    Or  ondansetron, 4 mg, Q6H PRN  glucose, 15 g, PRN  dextrose, 12.5 g, PRN  glucagon (rDNA), 1 mg, PRN  dextrose, 100 mL/hr, PRN  LORazepam, 0.5 mg, BID PRN      Continuous Infusions:   [Held by provider] dextrose 5 % and 0.45 % NaCl 100 mL/hr at 11/08/20 0825    dextrose Stopped (11/04/20 0852)       Review of Systems  All systems reviewed. All negative except for symptoms mentioned in HPI     OBJECTIVE    BP (!) 144/82   Pulse 105   Temp 98 °F (36.7 °C) (Temporal)   Resp 14   Ht 5' 4\" (1.626 m)   Wt 139 lb 1.6 oz (63.1 kg)   LMP 07/01/2020   SpO2 98%   BMI 23.88 kg/m²     Intake/Output Summary (Last 24 hours) at 11/11/2020 0858  Last data filed at 11/11/2020 0700  Gross per 24 hour   Intake 780 ml   Output 1483 ml   Net -703 ml       Physical examination:  General: awake alert, oriented x3, no abnormal position or movements. HEENT: normocephalic non-traumatic, no exophthalmos   Neck: supple, no LN enlargement, no thyromegaly, no thyroid tenderness, no JVD. Pulm: Clear equal air entry no added sounds, no wheezing or rhonchi    CVS: S1 + S2, no murmur, no heave  Abd: soft lax, no tenderness, no organomegaly, audible bowel sounds. Skin: warm, no lesions, no rash. Feet: sensory exam of the feet is present, No ulcers or open wounds.  Good peripheral pulses  Neuro: CN intact, muscle power normal  Psych: normal mood, and affect    Review of Laboratory Data:  I personally reviewed the following labs:   Recent Labs     11/09/20  0520 11/10/20  0527 11/11/20  0615   WBC 7.6 8.9 6.0   RBC 3.34* 3.20* 3.27*   HGB 9.9* 9.3* 9.7*   HCT 31.5* 30.0* 30.8*   MCV 94.3 93.8 94.2   MCH 29.6 29.1 29.7   MCHC 31.4* 31.0* 31.5*   RDW 15.7* 15.3* 15.9*    279 303   MPV 10.2 10.6 10.0     Recent Labs     11/09/20  0520 11/09/20  0830  11/09/20  2005  11/10/20  0005 11/10/20  0527 11/11/20  0615    143  --   --   --   --  142 141   K 3.5 2.9*   < > 5.7*  --   --  3.8 4.0   CL 99 103  --   --   --   --  102 101   CO2 23 23  --   --   --   --  26 24   BUN 37* 39*  --   --   --   --  45* 46*   CREATININE 7.3* 7.4*  --   --   --   --  7.5* 7.5*   GLUCOSE 371* 65*  --   --    < > 580* 119* 131*   CALCIUM 8.5* 8.8  --   --   --   --  8.9 9.1   PROT 5.3*  --   --   --   --   --  5.1* 5.2*   LABALBU 3.7  --   --   --   --   --  3.4* 3.5   BILITOT <0.2  --   --   --   --   --  <0.2 <0.2   ALKPHOS 142*  --   --   --   --   --  146* 107*   AST 12  --   --   --   --   --  11 12   ALT <5  --   --   --   --   --  <5 <5    < > = values in this interval not displayed.      Beta-Hydroxybutyrate   Date Value Ref Range Status   11/07/2020 0.11 0.02 - 0.27 mmol/L Final   10/31/2020 0.56 (H) 0.02 - 0.27 mmol/L Final   10/08/2020 0.17 0.02 - 0.27 mmol/L Final     Lab Results   Component Value Date    LABA1C 6.2 11/09/2020    LABA1C 8.6 07/19/2020    LABA1C 8.8 01/23/2020     Lab Results   Component Value Date/Time    TSH 6.000 (H) 10/31/2020 07:47 AM    T4FREE 0.78 (L) 10/31/2020 10:43 AM    N3ZNCHU 8.6 11/05/2015 02:20 AM    FT3 1.3 (L) 10/31/2020 10:43 AM    I1MEBQL 36.73 (L) 07/20/2020 02:00 PM     Lab Results   Component Value Date    LABA1C 6.2 11/09/2020    GLUCOSE 131 11/11/2020    GLUCOSE 130 05/18/2012    MALBCR 2949.3 01/15/2020    LABMICR 1740.1 01/15/2020    LABCREA 59 01/15/2020    LABCREA 61 01/15/2020     Lab Results   Component Value Date    TRIG 82 01/14/2020    HDL 33 01/14/2020    LDLCALC 46 01/14/2020    CHOL 95 01/14/2020       Blood culture   Lab Results   Component Value Date    BC 24 Hours no growth 11/07/2020    BC 5 Days no growth 10/31/2020       Radiology:  XR CHEST PORTABLE   Final Result   No definite new tubing is visualized radiographically, except for small IV   site in right upper arm. New linear densities in lateral left lung base may reflect atelectasis versus   or early infiltrate      Slight cardiomegaly without vascular congestion         XR CHEST PORTABLE   Final Result   Mild areas of patchy opacities in the bilateral lower lungs which could   represent atelectasis versus airspace disease process. Mild cardiomegaly. Medical Records/Labs/Images review:   I personally reviewed and summarized previous records   All labs and imaging were reviewed independently     [address] Maldonado, a 31 y.o.-old female seen today for inpatient diabetes management     Diabetes Mellitus type I ,  · Patient's diabetes is uncontrolled, labile blood glucose. · For now, will change diabetes regimen to  · Lantus 6 units at night  · Premeal humalog 2 units with meal  · Modified sliding scale 1u:100>180 with meals and at bedtime  · 670g Medtronic insulin pump with CGM was approved and shipped. Plan to start insulin pump training few days after discharge. · Continue glucose check with meals and at bedtime   · Will titrate insulin dose based on the blood glucose trend & insulin requirement  · Pt will follow with us shortly after discharge.  Endocrine follow up visit, Monday 11/23 at 9:00AM     Hypothyroidism  · Ct levothyroxine 75 mcg daily (dose was recently adjusted from 50 mcg/day as TSH was 6.0)     ESRD  · continue CCPD , nephrology following    Infective Endocarditis s/p vegectomy  · Management per ID, CTS, cardiology    Interdisciplinary plan for communication with healthcare providers:   Consult recommendations were discussed with the Primary Service/Nursing staff      The above issues were reviewed with the patient who understood and agreed with the plan. Lucia Pisano PGY 3  Internal Medicine Resident    Thank you for allowing us to participate in the care of this patient. Please do not hesitate to contact us with any additional questions.      Pardeep Guadarrama MD  Endocrinologist, [facility]   [address] [place] [id number]   Phone: [phone]  Fax: [phone]

## 2020-11-11 NOTE — PROGRESS NOTES
Unable to reach ID office through [address] [place] [address]. Bedside RN and SW made aware.
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
## 2020-11-11 NOTE — CARE COORDINATION
SOCIAL WORK/CASEMANAGEMENT TRANSITION OF CARE [id number] Annette Pedersen, [address], [place], [phone]): [facility] at [phone] is setup for last iv abx infusion on 11/18/2020 at 2 p.m. She needs to  get a dose here before she leaves today. Then pull midline. Floor and this sw sent perfect serve to ID multiple times today with calls to the office. I left a message with the perfect serve return call to call floor for order.   Vibha Lord 11/11/2020

## 2020-11-11 NOTE — CARE COORDINATION
SOCIAL WORK/CASEMANAGEMENT TRANSITION OF CARE [id number] [place], [address], [place], [phone]): pt was not discharge last night. I called juan at [facility] infusion center at [phone] and told her that the rn was trying to get ahold of dr. Claudean Koh from ID to see if he will order a dose of the iv vanco to give today and they the infusion center can give the last dose in 7 days. Will follow up with juan once ID rounds.  Malina Sharma  11/11/2020 WDL

## 2020-11-11 NOTE — FLOWSHEET NOTE
11/11/20 0700   Peritoneal Dialysis Catheter Left lower abdomen   Placement Date/Time: 10/31/20 6064   Catheter Location: Left lower abdomen   Status Deaccessed   Site Condition No Complications   Dressing Status Dry;Clean; Intact   Dressing Gauze   Peritoneal Dialysis (CAPD manual)   Effluent Appearance Clear;Light;Yellow   Cycler   Ultrafiltration (UF) (mL) 783 mL   CCPD completed, LLQ cath de-accessed using aseptic technique. Net fluid removal 783 ml of clear light yellow effluent noted, tolerated well.

## 2020-11-11 NOTE — PROGRESS NOTES
[facility]  Internal Medicine Residency Program  Progress Note  - House Team 1    Patient:  Efrain Barnett 31 y.o. female MRN: [id number]     Date of Service: 11/11/2020     CC: fatigue and chest discomfort  Overnight events: N/A    Subjective       Patient seen and examined at bedside this AM, hypertensive at 160/100, tachycardic at 114, vitals otherwise stable. She currently endorses 7/10 sharp pain at surgical site in her R groin that occasionally radiates around the waist. She also endorses whole body pruritis which she claims has been occurring every time the sheets are changed. She denies subjective fevers, chills, chest pain, palpitations, cough, dyspnea, abdominal pain, N/V/D, or constipation. Objective     Physical Exam:  · Vitals: BP (!) 144/82   Pulse 105   Temp 98 °F (36.7 °C) (Temporal)   Resp 14   Ht 5' 4\" (1.626 m)   Wt 139 lb 1.6 oz (63.1 kg)   LMP 07/01/2020   SpO2 98%   BMI 23.88 kg/m²     · I & O - 24hr: No intake/output data recorded. · General Appearance: alert, appears stated age and cooperative  · HEENT:  Head: Normocephalic, no lesions, without obvious abnormality. · Neck: no adenopathy, supple, symmetrical, trachea midline and thyroid not enlarged, symmetric, no tenderness/mass/nodules  · Lung: clear to auscultation bilaterally  · Heart: regular rate and rhythm, tachycardia  · Abdomen: soft, non-tender; bowel sounds normal; no masses,  no organomegaly  · Extremities:  extremities normal, atraumatic, no cyanosis or edema; peripheral pulses full and equal. Surgical site shows no erythema or drainage. · Musculokeletal: No joint swelling, no muscle tenderness. ROM normal in all joints of extremities.    · Neurologic: Mental status: Alert, oriented, thought content appropriate    Pertinent Labs & Imaging Studies     CBC with Differential:    Lab Results   Component Value Date    WBC 6.0 11/11/2020    RBC 3.27 11/11/2020    HGB 9.7 11/11/2020    HCT 30.8 septum superior to the fossa ovalis, at the   junction of the SVC and the RA.             -CUCTZYKA 85/4 without complications              -Surgical culture, anaerobic culture, blood culture negative              -Monitor VS     2. Staph epidermidis bacteremia suspected 2/2 endocarditis s/p removal of mediport - Resolved              -Blood culture grew staph epidermidis (10/8/20)              -Mediport removed              -Repeat blood cultures (10/31) showed no growth 24hrs              -Given IV vancomycin 1000mg     3. Nausea - Resolved              -Zofran 4mg IV q6hrs PRN nausea              -Tigan 200mg intramuscular q6hrs PRN nausea              -Monitor     4. C diff infection (10/25) - Resolving              -Negative C diff toxin (10/31)              -XUZCDSX denying diarrhea for several days              -Continue vancomycin PO to complete 14 day course (day 11)     5. ESRD on peritoneal dialysis              -Nephrology following              -Continue peritoneal dialysis              -Continue bumex 2mg PO BID              -Continue metolazone 5mg PO 1x daily              -EPO 5000units 3x weekly     6. Type I DM              -Endocrine consulted, appreciate recommendations              -Start lantus 7 units at night              -Stop premeal humalog    -Continue modified sliding scale              -POCT q4hrs; glucose goal 140-180 mg/dl              -Encourage PO intake, dietary supplements   -Discussed importance of glucose tablets at home   -Insulin pump approved and shipped, f/u pump training outpatient   -Will f/u with Dr. Cristin Doe 11/23              -Monitor    7. Pruritus   -Patient endorsing several days of whole body pruritus (11/11)   -Given benadryl 50mg IV x1    8. HTN              -Continue lisinopril 20mg PO 1x daily              -Continue diltiazem 240 mg PO 1x daily              -Continue Hydralazine 25mg PO q8hrs              -Monitor     9. Hypothyroidism              -TSH 6.0.  T3

## 2020-11-12 LAB
BLOOD CULTURE, ROUTINE: NORMAL
CULTURE, BLOOD 2: NORMAL

## 2020-11-14 LAB — ALUMINUM: 12.2 UG/L (ref 0–15)

## 2020-11-20 ENCOUNTER — APPOINTMENT (OUTPATIENT)
Dept: CT IMAGING | Age: 30
DRG: 420 | End: 2020-11-20
Payer: COMMERCIAL

## 2020-11-20 ENCOUNTER — HOSPITAL ENCOUNTER (INPATIENT)
Age: 30
LOS: 2 days | Discharge: HOME | DRG: 420 | End: 2020-11-22
Attending: EMERGENCY MEDICINE | Admitting: INTERNAL MEDICINE
Payer: COMMERCIAL

## 2020-11-20 ENCOUNTER — APPOINTMENT (OUTPATIENT)
Dept: GENERAL RADIOLOGY | Age: 30
DRG: 420 | End: 2020-11-20
Payer: COMMERCIAL

## 2020-11-20 ENCOUNTER — APPOINTMENT (OUTPATIENT)
Dept: MRI IMAGING | Age: 30
DRG: 420 | End: 2020-11-20
Payer: COMMERCIAL

## 2020-11-20 PROBLEM — R56.9 SEIZURE (HCC): Status: ACTIVE | Noted: 2020-11-20

## 2020-11-20 PROBLEM — E11.00 HYPEROSMOLAR HYPERGLYCEMIC STATE (HHS) (HCC): Status: ACTIVE | Noted: 2020-11-20

## 2020-11-20 LAB
ACETAMINOPHEN LEVEL: <5 MCG/ML (ref 10–30)
ALBUMIN SERPL-MCNC: 3.2 G/DL (ref 3.5–5.2)
ALP BLD-CCNC: 143 U/L (ref 35–104)
ALT SERPL-CCNC: 5 U/L (ref 0–32)
ANION GAP SERPL CALCULATED.3IONS-SCNC: 16 MMOL/L (ref 7–16)
AST SERPL-CCNC: 18 U/L (ref 0–31)
BASOPHILS ABSOLUTE: 0.1 E9/L (ref 0–0.2)
BASOPHILS RELATIVE PERCENT: 1.4 % (ref 0–2)
BETA-HYDROXYBUTYRATE: 0.47 MMOL/L (ref 0.02–0.27)
BILIRUB SERPL-MCNC: 0.2 MG/DL (ref 0–1.2)
BUN BLDV-MCNC: 47 MG/DL (ref 6–20)
CALCIUM SERPL-MCNC: 8 MG/DL (ref 8.6–10.2)
CHLORIDE BLD-SCNC: 90 MMOL/L (ref 98–107)
CHP ED QC CHECK: NORMAL
CO2: 24 MMOL/L (ref 22–29)
CREAT SERPL-MCNC: 7.3 MG/DL (ref 0.5–1)
EKG ATRIAL RATE: 85 BPM
EKG P AXIS: 54 DEGREES
EKG P-R INTERVAL: 126 MS
EKG Q-T INTERVAL: 412 MS
EKG QRS DURATION: 84 MS
EKG QTC CALCULATION (BAZETT): 490 MS
EKG R AXIS: 63 DEGREES
EKG T AXIS: 70 DEGREES
EKG VENTRICULAR RATE: 85 BPM
EOSINOPHILS ABSOLUTE: 0.51 E9/L (ref 0.05–0.5)
EOSINOPHILS RELATIVE PERCENT: 7.1 % (ref 0–6)
ETHANOL: <10 MG/DL (ref 0–0.08)
GFR AFRICAN AMERICAN: 8
GFR NON-AFRICAN AMERICAN: 8 ML/MIN/1.73
GLUCOSE BLD-MCNC: 1040 MG/DL (ref 74–99)
GLUCOSE BLD-MCNC: 269 MG/DL
HCG QUALITATIVE: NEGATIVE
HCT VFR BLD CALC: 32.3 % (ref 34–48)
HEMOGLOBIN: 10.3 G/DL (ref 11.5–15.5)
IMMATURE GRANULOCYTES #: 0.02 E9/L
IMMATURE GRANULOCYTES %: 0.3 % (ref 0–5)
LYMPHOCYTES ABSOLUTE: 1.42 E9/L (ref 1.5–4)
LYMPHOCYTES RELATIVE PERCENT: 19.8 % (ref 20–42)
MAGNESIUM: 2.6 MG/DL (ref 1.6–2.6)
MCH RBC QN AUTO: 30 PG (ref 26–35)
MCHC RBC AUTO-ENTMCNC: 31.9 % (ref 32–34.5)
MCV RBC AUTO: 94.2 FL (ref 80–99.9)
METER GLUCOSE: 215 MG/DL (ref 74–99)
METER GLUCOSE: 269 MG/DL (ref 74–99)
METER GLUCOSE: 495 MG/DL (ref 74–99)
METER GLUCOSE: >500 MG/DL (ref 74–99)
MONOCYTES ABSOLUTE: 0.36 E9/L (ref 0.1–0.95)
MONOCYTES RELATIVE PERCENT: 5 % (ref 2–12)
NEUTROPHILS ABSOLUTE: 4.76 E9/L (ref 1.8–7.3)
NEUTROPHILS RELATIVE PERCENT: 66.4 % (ref 43–80)
PDW BLD-RTO: 16.1 FL (ref 11.5–15)
PH VENOUS: 7.35 (ref 7.35–7.45)
PHOSPHORUS: 8 MG/DL (ref 2.5–4.5)
PLATELET # BLD: 169 E9/L (ref 130–450)
PMV BLD AUTO: 11.3 FL (ref 7–12)
POTASSIUM REFLEX MAGNESIUM: 3.5 MMOL/L (ref 3.5–5)
RBC # BLD: 3.43 E12/L (ref 3.5–5.5)
REASON FOR REJECTION: NORMAL
REJECTED TEST: NORMAL
SALICYLATE, SERUM: <0.3 MG/DL (ref 0–30)
SODIUM BLD-SCNC: 130 MMOL/L (ref 132–146)
TOTAL PROTEIN: 5.6 G/DL (ref 6.4–8.3)
TRICYCLIC ANTIDEPRESSANTS SCREEN SERUM: ABNORMAL NG/ML
TROPONIN: 0.06 NG/ML (ref 0–0.03)
TROPONIN: 0.07 NG/ML (ref 0–0.03)
TSH SERPL DL<=0.05 MIU/L-ACNC: 4.48 UIU/ML (ref 0.27–4.2)
VANCOMYCIN RANDOM: 10.5 MCG/ML (ref 5–40)
WBC # BLD: 7.2 E9/L (ref 4.5–11.5)

## 2020-11-20 PROCEDURE — 85025 COMPLETE CBC W/AUTO DIFF WBC: CPT

## 2020-11-20 PROCEDURE — 6370000000 HC RX 637 (ALT 250 FOR IP): Performed by: INTERNAL MEDICINE

## 2020-11-20 PROCEDURE — 2580000003 HC RX 258: Performed by: EMERGENCY MEDICINE

## 2020-11-20 PROCEDURE — C1751 CATH, INF, PER/CENT/MIDLINE: HCPCS

## 2020-11-20 PROCEDURE — 6360000002 HC RX W HCPCS: Performed by: INTERNAL MEDICINE

## 2020-11-20 PROCEDURE — 6370000000 HC RX 637 (ALT 250 FOR IP): Performed by: NURSE PRACTITIONER

## 2020-11-20 PROCEDURE — 2500000003 HC RX 250 WO HCPCS: Performed by: EMERGENCY MEDICINE

## 2020-11-20 PROCEDURE — 84484 ASSAY OF TROPONIN QUANT: CPT

## 2020-11-20 PROCEDURE — 82962 GLUCOSE BLOOD TEST: CPT

## 2020-11-20 PROCEDURE — 02HV33Z INSERTION OF INFUSION DEVICE INTO SUPERIOR VENA CAVA, PERCUTANEOUS APPROACH: ICD-10-PCS | Performed by: INTERNAL MEDICINE

## 2020-11-20 PROCEDURE — 93005 ELECTROCARDIOGRAM TRACING: CPT | Performed by: EMERGENCY MEDICINE

## 2020-11-20 PROCEDURE — 87040 BLOOD CULTURE FOR BACTERIA: CPT

## 2020-11-20 PROCEDURE — 70551 MRI BRAIN STEM W/O DYE: CPT

## 2020-11-20 PROCEDURE — 93010 ELECTROCARDIOGRAM REPORT: CPT | Performed by: INTERNAL MEDICINE

## 2020-11-20 PROCEDURE — 2580000003 HC RX 258: Performed by: INTERNAL MEDICINE

## 2020-11-20 PROCEDURE — 80202 ASSAY OF VANCOMYCIN: CPT

## 2020-11-20 PROCEDURE — G0480 DRUG TEST DEF 1-7 CLASSES: HCPCS

## 2020-11-20 PROCEDURE — 99284 EMERGENCY DEPT VISIT MOD MDM: CPT

## 2020-11-20 PROCEDURE — 84100 ASSAY OF PHOSPHORUS: CPT

## 2020-11-20 PROCEDURE — 82800 BLOOD PH: CPT

## 2020-11-20 PROCEDURE — 6370000000 HC RX 637 (ALT 250 FOR IP): Performed by: EMERGENCY MEDICINE

## 2020-11-20 PROCEDURE — 76937 US GUIDE VASCULAR ACCESS: CPT

## 2020-11-20 PROCEDURE — 84703 CHORIONIC GONADOTROPIN ASSAY: CPT

## 2020-11-20 PROCEDURE — 80307 DRUG TEST PRSMV CHEM ANLYZR: CPT

## 2020-11-20 PROCEDURE — 84443 ASSAY THYROID STIM HORMONE: CPT

## 2020-11-20 PROCEDURE — 99223 1ST HOSP IP/OBS HIGH 75: CPT | Performed by: INTERNAL MEDICINE

## 2020-11-20 PROCEDURE — 82010 KETONE BODYS QUAN: CPT

## 2020-11-20 PROCEDURE — 36415 COLL VENOUS BLD VENIPUNCTURE: CPT

## 2020-11-20 PROCEDURE — 70450 CT HEAD/BRAIN W/O DYE: CPT

## 2020-11-20 PROCEDURE — 83735 ASSAY OF MAGNESIUM: CPT

## 2020-11-20 PROCEDURE — 96374 THER/PROPH/DIAG INJ IV PUSH: CPT

## 2020-11-20 PROCEDURE — 80053 COMPREHEN METABOLIC PANEL: CPT

## 2020-11-20 PROCEDURE — 36569 INSJ PICC 5 YR+ W/O IMAGING: CPT

## 2020-11-20 PROCEDURE — 71045 X-RAY EXAM CHEST 1 VIEW: CPT

## 2020-11-20 PROCEDURE — 2060000000 HC ICU INTERMEDIATE R&B

## 2020-11-20 RX ORDER — POLYETHYLENE GLYCOL 3350 17 G/17G
17 POWDER, FOR SOLUTION ORAL DAILY PRN
Status: DISCONTINUED | OUTPATIENT
Start: 2020-11-20 | End: 2020-11-22 | Stop reason: HOSPADM

## 2020-11-20 RX ORDER — DEXTROSE MONOHYDRATE 25 G/50ML
12.5 INJECTION, SOLUTION INTRAVENOUS PRN
Status: DISCONTINUED | OUTPATIENT
Start: 2020-11-20 | End: 2020-11-22 | Stop reason: HOSPADM

## 2020-11-20 RX ORDER — LIDOCAINE HYDROCHLORIDE 10 MG/ML
5 INJECTION, SOLUTION EPIDURAL; INFILTRATION; INTRACAUDAL; PERINEURAL ONCE
Status: DISCONTINUED | OUTPATIENT
Start: 2020-11-20 | End: 2020-11-22 | Stop reason: HOSPADM

## 2020-11-20 RX ORDER — INSULIN GLARGINE 100 [IU]/ML
6 INJECTION, SOLUTION SUBCUTANEOUS NIGHTLY
Status: DISCONTINUED | OUTPATIENT
Start: 2020-11-20 | End: 2020-11-20

## 2020-11-20 RX ORDER — HEPARIN SODIUM (PORCINE) LOCK FLUSH IV SOLN 100 UNIT/ML 100 UNIT/ML
3 SOLUTION INTRAVENOUS PRN
Status: DISCONTINUED | OUTPATIENT
Start: 2020-11-20 | End: 2020-11-22 | Stop reason: HOSPADM

## 2020-11-20 RX ORDER — NICOTINE POLACRILEX 4 MG
15 LOZENGE BUCCAL PRN
Status: DISCONTINUED | OUTPATIENT
Start: 2020-11-20 | End: 2020-11-20 | Stop reason: SDUPTHER

## 2020-11-20 RX ORDER — METOPROLOL TARTRATE 100 MG/1
100 TABLET ORAL 2 TIMES DAILY
COMMUNITY
End: 2021-02-15 | Stop reason: DRUGHIGH

## 2020-11-20 RX ORDER — BUMETANIDE 1 MG/1
2 TABLET ORAL 2 TIMES DAILY
Status: DISCONTINUED | OUTPATIENT
Start: 2020-11-20 | End: 2020-11-22 | Stop reason: HOSPADM

## 2020-11-20 RX ORDER — ACETAMINOPHEN 325 MG/1
650 TABLET ORAL EVERY 6 HOURS PRN
Status: DISCONTINUED | OUTPATIENT
Start: 2020-11-20 | End: 2020-11-22 | Stop reason: HOSPADM

## 2020-11-20 RX ORDER — SODIUM CHLORIDE 0.9 % (FLUSH) 0.9 %
10 SYRINGE (ML) INJECTION EVERY 12 HOURS SCHEDULED
Status: DISCONTINUED | OUTPATIENT
Start: 2020-11-20 | End: 2020-11-22 | Stop reason: HOSPADM

## 2020-11-20 RX ORDER — 0.9 % SODIUM CHLORIDE 0.9 %
1000 INTRAVENOUS SOLUTION INTRAVENOUS ONCE
Status: COMPLETED | OUTPATIENT
Start: 2020-11-20 | End: 2020-11-20

## 2020-11-20 RX ORDER — ACETAMINOPHEN 650 MG/1
650 SUPPOSITORY RECTAL EVERY 6 HOURS PRN
Status: DISCONTINUED | OUTPATIENT
Start: 2020-11-20 | End: 2020-11-22 | Stop reason: HOSPADM

## 2020-11-20 RX ORDER — LEVOTHYROXINE SODIUM 0.07 MG/1
75 TABLET ORAL
Status: DISCONTINUED | OUTPATIENT
Start: 2020-11-21 | End: 2020-11-22 | Stop reason: HOSPADM

## 2020-11-20 RX ORDER — DEXTROSE MONOHYDRATE 25 G/50ML
12.5 INJECTION, SOLUTION INTRAVENOUS PRN
Status: DISCONTINUED | OUTPATIENT
Start: 2020-11-20 | End: 2020-11-20 | Stop reason: SDUPTHER

## 2020-11-20 RX ORDER — INSULIN GLARGINE 100 [IU]/ML
10 INJECTION, SOLUTION SUBCUTANEOUS 2 TIMES DAILY
Status: DISCONTINUED | OUTPATIENT
Start: 2020-11-20 | End: 2020-11-21

## 2020-11-20 RX ORDER — LABETALOL HYDROCHLORIDE 5 MG/ML
10 INJECTION, SOLUTION INTRAVENOUS ONCE
Status: COMPLETED | OUTPATIENT
Start: 2020-11-20 | End: 2020-11-20

## 2020-11-20 RX ORDER — HEPARIN SODIUM (PORCINE) LOCK FLUSH IV SOLN 100 UNIT/ML 100 UNIT/ML
3 SOLUTION INTRAVENOUS EVERY 12 HOURS SCHEDULED
Status: DISCONTINUED | OUTPATIENT
Start: 2020-11-20 | End: 2020-11-22 | Stop reason: HOSPADM

## 2020-11-20 RX ORDER — SODIUM CHLORIDE 0.9 % (FLUSH) 0.9 %
10 SYRINGE (ML) INJECTION PRN
Status: DISCONTINUED | OUTPATIENT
Start: 2020-11-20 | End: 2020-11-22 | Stop reason: HOSPADM

## 2020-11-20 RX ORDER — BUMETANIDE 1 MG/1
2 TABLET ORAL DAILY
Status: ON HOLD | COMMUNITY
End: 2020-12-29 | Stop reason: HOSPADM

## 2020-11-20 RX ORDER — NICOTINE POLACRILEX 4 MG
15 LOZENGE BUCCAL PRN
Status: DISCONTINUED | OUTPATIENT
Start: 2020-11-20 | End: 2020-11-22 | Stop reason: HOSPADM

## 2020-11-20 RX ORDER — INSULIN GLARGINE 100 [IU]/ML
10 INJECTION, SOLUTION SUBCUTANEOUS 2 TIMES DAILY
Status: ON HOLD | COMMUNITY
End: 2020-11-22 | Stop reason: HOSPADM

## 2020-11-20 RX ORDER — DILTIAZEM HYDROCHLORIDE 240 MG/1
240 CAPSULE, COATED, EXTENDED RELEASE ORAL DAILY
Status: DISCONTINUED | OUTPATIENT
Start: 2020-11-20 | End: 2020-11-22 | Stop reason: HOSPADM

## 2020-11-20 RX ORDER — METOLAZONE 2.5 MG/1
5 TABLET ORAL DAILY
Status: DISCONTINUED | OUTPATIENT
Start: 2020-11-20 | End: 2020-11-22 | Stop reason: HOSPADM

## 2020-11-20 RX ORDER — DEXTROSE MONOHYDRATE 50 MG/ML
100 INJECTION, SOLUTION INTRAVENOUS PRN
Status: DISCONTINUED | OUTPATIENT
Start: 2020-11-20 | End: 2020-11-22 | Stop reason: HOSPADM

## 2020-11-20 RX ORDER — LORAZEPAM 2 MG/ML
INJECTION INTRAMUSCULAR
Status: DISPENSED
Start: 2020-11-20 | End: 2020-11-20

## 2020-11-20 RX ORDER — POTASSIUM CHLORIDE 20 MEQ/1
20 TABLET, EXTENDED RELEASE ORAL DAILY
Status: ON HOLD | COMMUNITY
End: 2020-12-29 | Stop reason: HOSPADM

## 2020-11-20 RX ORDER — M-VIT,TX,IRON,MINS/CALC/FOLIC 27MG-0.4MG
1 TABLET ORAL DAILY
Status: DISCONTINUED | OUTPATIENT
Start: 2020-11-20 | End: 2020-11-22 | Stop reason: HOSPADM

## 2020-11-20 RX ORDER — METOCLOPRAMIDE 5 MG/1
5 TABLET ORAL 3 TIMES DAILY
COMMUNITY
End: 2021-09-28 | Stop reason: ALTCHOICE

## 2020-11-20 RX ORDER — ATORVASTATIN CALCIUM 40 MG/1
40 TABLET, FILM COATED ORAL NIGHTLY
Status: DISCONTINUED | OUTPATIENT
Start: 2020-11-20 | End: 2020-11-22 | Stop reason: HOSPADM

## 2020-11-20 RX ORDER — DEXTROSE MONOHYDRATE 50 MG/ML
100 INJECTION, SOLUTION INTRAVENOUS PRN
Status: DISCONTINUED | OUTPATIENT
Start: 2020-11-20 | End: 2020-11-20 | Stop reason: SDUPTHER

## 2020-11-20 RX ORDER — LISINOPRIL 10 MG/1
20 TABLET ORAL DAILY
Status: DISCONTINUED | OUTPATIENT
Start: 2020-11-20 | End: 2020-11-22 | Stop reason: HOSPADM

## 2020-11-20 RX ADMIN — SODIUM CHLORIDE 1000 ML: 9 INJECTION, SOLUTION INTRAVENOUS at 02:00

## 2020-11-20 RX ADMIN — METOPROLOL TARTRATE 100 MG: 25 TABLET, FILM COATED ORAL at 14:05

## 2020-11-20 RX ADMIN — VANCOMYCIN HYDROCHLORIDE 1000 MG: 1 INJECTION, POWDER, LYOPHILIZED, FOR SOLUTION INTRAVENOUS at 22:43

## 2020-11-20 RX ADMIN — LABETALOL HYDROCHLORIDE 10 MG: 5 INJECTION INTRAVENOUS at 12:41

## 2020-11-20 RX ADMIN — DILTIAZEM HYDROCHLORIDE 240 MG: 240 CAPSULE, COATED, EXTENDED RELEASE ORAL at 14:05

## 2020-11-20 RX ADMIN — INSULIN HUMAN 12 UNITS: 100 INJECTION, SOLUTION PARENTERAL at 04:07

## 2020-11-20 RX ADMIN — LISINOPRIL 20 MG: 10 TABLET ORAL at 14:08

## 2020-11-20 RX ADMIN — METOPROLOL TARTRATE 100 MG: 25 TABLET, FILM COATED ORAL at 20:09

## 2020-11-20 RX ADMIN — INSULIN LISPRO 6 UNITS: 100 INJECTION, SOLUTION INTRAVENOUS; SUBCUTANEOUS at 20:30

## 2020-11-20 RX ADMIN — INSULIN GLARGINE 10 UNITS: 100 INJECTION, SOLUTION SUBCUTANEOUS at 20:29

## 2020-11-20 RX ADMIN — Medication 10 ML: at 20:14

## 2020-11-20 RX ADMIN — MULTIPLE VITAMINS W/ MINERALS TAB 1 TABLET: TAB at 14:05

## 2020-11-20 RX ADMIN — METOLAZONE 5 MG: 2.5 TABLET ORAL at 14:08

## 2020-11-20 RX ADMIN — ATORVASTATIN CALCIUM 40 MG: 40 TABLET, FILM COATED ORAL at 20:09

## 2020-11-20 RX ADMIN — SODIUM CHLORIDE 1000 ML: 9 INJECTION, SOLUTION INTRAVENOUS at 12:39

## 2020-11-20 RX ADMIN — SODIUM CHLORIDE, PRESERVATIVE FREE 300 UNITS: 5 INJECTION INTRAVENOUS at 20:10

## 2020-11-20 RX ADMIN — BUMETANIDE 2 MG: 1 TABLET ORAL at 14:08

## 2020-11-20 ASSESSMENT — PAIN DESCRIPTION - LOCATION
LOCATION: GENERALIZED
LOCATION: GROIN

## 2020-11-20 ASSESSMENT — PAIN - FUNCTIONAL ASSESSMENT: PAIN_FUNCTIONAL_ASSESSMENT: PREVENTS OR INTERFERES SOME ACTIVE ACTIVITIES AND ADLS

## 2020-11-20 ASSESSMENT — PAIN DESCRIPTION - DESCRIPTORS
DESCRIPTORS: ACHING;DISCOMFORT
DESCRIPTORS: ACHING;DISCOMFORT

## 2020-11-20 ASSESSMENT — PAIN DESCRIPTION - FREQUENCY
FREQUENCY: INTERMITTENT
FREQUENCY: INTERMITTENT

## 2020-11-20 ASSESSMENT — PAIN DESCRIPTION - PROGRESSION
CLINICAL_PROGRESSION: GRADUALLY IMPROVING
CLINICAL_PROGRESSION: GRADUALLY IMPROVING

## 2020-11-20 ASSESSMENT — PAIN SCALES - GENERAL
PAINLEVEL_OUTOF10: 3
PAINLEVEL_OUTOF10: 3

## 2020-11-20 ASSESSMENT — PAIN DESCRIPTION - ORIENTATION: ORIENTATION: RIGHT

## 2020-11-20 ASSESSMENT — PAIN DESCRIPTION - ONSET
ONSET: ON-GOING
ONSET: ON-GOING

## 2020-11-20 ASSESSMENT — PAIN DESCRIPTION - PAIN TYPE
TYPE: ACUTE PAIN
TYPE: ACUTE PAIN

## 2020-11-20 NOTE — ED NOTES
Patient iv infiltrated ice applied. Dr aware of this and said no need for central line patient is not in DKA.       Jess Bhandari, RN  11/20/20 9211

## 2020-11-20 NOTE — CONSULTS
disorders of kidney and ureter     Pregnancy 3/30/2016    16 weeks    Seizure ([address] 2020    Severe pre-eclampsia in third trimester 2016     Past Surgical History:        Procedure Laterality Date    BACK SURGERY      abscess     SECTION      x2    CHOLECYSTECTOMY, LAPAROSCOPIC N/A 2019    CHOLECYSTECTOMY LAPAROSCOPIC performed by Ulysses Silva, MD at [address] N/A 2018    COLONOSCOPY WITH BIOPSY performed by Dennys Webster MD at [address] [facility] N/A 2018    COLONOSCOPY WITH BIOPSY performed by Mindy Franz MD at [address]  2012    EF 57%    ECHO COMPL W DOP COLOR FLOW  6/10/2013         EMBOLECTOMY N/A 2020    [address] [facility], [facility], [place] -- REQS ROOM 3 performed by Evaristo Whitt MD at [address] [facility] N/A 2020    LAPAROSCOPIC INSERTION PERITONEAL DIALYSIS CATHETER performed by Emily Suarez MD at [facility] [address] ESOPHAGOGASTRODUODENOSCOPY TRANSORAL DIAGNOSTIC N/A 2018    EGD ESOPHAGOGASTRODUODENOSCOPY performed by Dennys Webster MD at [address] [facility] TRANSESOPHAGEAL ECHOCARDIOGRAM N/A 10/19/2020    TRANSESOPHAGEAL ECHOCARDIOGRAM WITH BUBBLE STUDY performed by Lore Jarrell MD at [address] [facility]  2018    UPPER GASTROINTESTINAL ENDOSCOPY  2018    EGD BIOPSY performed by Mindy Franz MD at [address] [facility] 10/11/2019    EGD ESOPHAGOGASTRODUODENOSCOPY performed by Halley Dietz DO at [place] [address]     Current Medications:    Current Facility-Administered Medications: glucose (GLUTOSE) 40 % oral gel 15 g, 15 g, Oral, PRN  dextrose 50 % IV solution, 12.5 g, Intravenous, PRN  glucagon (rDNA) injection 1 mg, 1 mg, Intramuscular, PRN  dextrose 5 % solution, 100 mL/hr, Intravenous, PRN  insulin regular (HUMULIN R;NOVOLIN R) 100 Units in sodium chloride 0.9 % 100 mL infusion, 0.1 Units/kg/hr, Intravenous, Continuous  labetalol (NORMODYNE;TRANDATE) injection 10 mg, 10 mg, Intravenous, Once  0.9 % sodium chloride bolus, 1,000 mL, Intravenous, Once  LORazepam (ATIVAN) 2 MG/ML injection, , ,   lidocaine PF 1 % injection 5 mL, 5 mL, Intradermal, Once  sodium chloride flush 0.9 % injection 10 mL, 10 mL, Intravenous, PRN  heparin flush 100 UNIT/ML injection 300 Units, 3 mL, Intravenous, 2 times per day  heparin flush 100 UNIT/ML injection 300 Units, 3 mL, Intracatheter, PRN  Facility-Administered Medications Ordered in Other Encounters: vancomycin 1000 mg IVPB in 250 mL D5W addavial, 1,000 mg, Intravenous, Once  Allergies: Toradol [ketorolac tromethamine]    Social History:    TOBACCO:   reports that she has been smoking cigarettes. She has a 3.00 pack-year smoking history. She uses smokeless tobacco.  ETOH:   reports no history of alcohol use. DRUGS:   reports no history of drug use.     Family History:       Problem Relation Age of Onset    Asthma Mother     Hypertension Mother     High Blood Pressure Mother     Diabetes Mother     Asthma Brother     High Blood Pressure Father      REVIEW OF SYSTEMS:    Pertinent positives and negatives are stated within HPI, all other systems reviewed and are negative    PHYSICAL EXAM:      Vitals:    VITALS:  BP (!) 196/111   Pulse 85   Temp 97.8 °F (36.6 °C) (Axillary)   Resp 12   Wt 145 lb (65.8 kg)   SpO2 97%   BMI 24.89 kg/m²   24HR INTAKE/OUTPUT:  No intake or output data in the 24 hours ending 11/20/20 0923    PD Catheter Exam:  PD catheter exit site clean    Constitutional:  disoriented  HEENT:  Normocephalic, PERRL  Respiratory:  CTA bilaterally  Cardiovascular/Edema:  RRR, S1/S2  Gastrointestinal:  Soft, PD catheter exit site clean   Neurologic:  Nonfocal, AVELAR  Skin:  Warm, dry, no lesions  Other:  No edema    DATA:    CBC with Differential:    Lab Results   Component Value Date    WBC 7.2 11/20/2020    RBC 3.43 11/20/2020 HGB 10.3 11/20/2020    HCT 32.3 11/20/2020     11/20/2020    MCV 94.2 11/20/2020    MCH 30.0 11/20/2020    MCHC 31.9 11/20/2020    RDW 16.1 11/20/2020    NRBC 0.9 08/10/2020    SEGSPCT 67 06/27/2013    METASPCT 1.7 08/10/2020    LYMPHOPCT 19.8 11/20/2020    MONOPCT 5.0 11/20/2020    BASOPCT 1.4 11/20/2020    MONOSABS 0.36 11/20/2020    LYMPHSABS 1.42 11/20/2020    EOSABS 0.51 11/20/2020    BASOSABS 0.10 11/20/2020     CMP:    Lab Results   Component Value Date     11/20/2020    K 3.5 11/20/2020    CL 90 11/20/2020    CO2 24 11/20/2020    BUN 47 11/20/2020    CREATININE 7.3 11/20/2020    GFRAA 8 11/20/2020    LABGLOM 8 11/20/2020    GLUCOSE 1,040 11/20/2020    GLUCOSE 130 05/18/2012    PROT 5.6 11/20/2020    LABALBU 3.2 11/20/2020    LABALBU 4.1 05/18/2012    CALCIUM 8.0 11/20/2020    BILITOT 0.2 11/20/2020    ALKPHOS 143 11/20/2020    AST 18 11/20/2020    ALT 5 11/20/2020     Magnesium:    Lab Results   Component Value Date    MG 2.6 11/20/2020     Phosphorus:    Lab Results   Component Value Date    PHOS 8.0 11/20/2020     Radiology Review:      CT head 11/20/20    No acute intracranial abnormality. MRI Brain 11/20/20   Interval resolution of relatively symmetric linear restricted diffusion    within the bilateral frontal parietal deep white matter with interval    development patchy FLAIR hyperintensities within the periventricular, deep,    and subcortical white matter, left greater than right.  Findings are    nonspecific with primary diagnostic consideration given to infectious versus    metabolic encephalopathy.  Correlation with CSF analysis may be of benefit.         IMPRESSION/RECOMMENDATIONS:    Kvng Burr is a 61-year-old female with history of ESRD secondary to hypertensive nephrosclerosis diabetic nephropathy, on Peritoneal Dialysis 10 liters/day with 4 manual exchanges of 2 liters, 1.5% every 9 hours/Cycle, last fill 2L of 2.5%, with severe proteinuria, poorly controlled type I DM with multiple admissions for DKA, gastroparesis, HTN who presented to the ER this morning via EMS with altered mental status secondary to diabetic ketoacidosis. Per family she had completed peritoneal dialysis and was then found on the floor. EMS reported her glucose was critically high. 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 Discharged on vancomycin 1 g every week x 2. She reportedly had a seizure in ER as well. She was started on Insulin drip in ER and was given 2 L NS. Lab work revealed sodium 130, BUN/Creat 47/7.3, glucose 1,040. We are consulted for ESRD on CCPD and hyperkalemia. 1. ESRD on peritoneal dialysis/CCPD. To continue with 4 exchanges all 2L of 1.5% dextose x 9 hours, last fill 2L of 2.5% (total 10 liters)  2. DKA: on insulin drip, glucose 1,040  3. Coagulase-negative Staphylococcus bacteremia, source [employer],[place] + vegetation/thrombus; On vancomycin  4. Seizures?: for EEG and CT of head  5. HTN, on metoprolol and diltiazem  6. Type I DM, poorly controlled, on SSI and Lantus  7. Anemia of CKD, hold MARIA TERESA ( hgb > 10.0)    Plan:    · Continue CCPD 4 exchanges of 2 L each of 1.5% dextrose over 9 hours, last fill of 2 L of 2.5% (total 10 L)  · Continue Bumex 2 mg twice daily  · Continue metolazone 5 mg daily  · Continue diltiazem 240 mg daily  · Continue lisinopril 20 mg daily  · Continue metoprolol 100 mg twice a day  · Continue to monitor labs  · Better glucose control  · Continue to monitor renal function      Thank you very much Dr. Seng Roman MD for allowing us to participate in the care of Khadijah Morin

## 2020-11-20 NOTE — H&P
[facility] History and Physical      CHIEF COMPLAINT:  Altered mental status    History of Present Illness: 46-year-old female with a history of ESRD on peritoneal dialysis, hypertension, hypothyroidism, prior substance abuse, diabetes mellitus type 1. 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 Discharged on vancomycin 1 g every week x 2. She uses peritoneal cycler at night, she isnt sure if she connected herself yesterday. She isnt able to state how she came to the ED, apparently family members found her on the floor unresponsive after she had ?disconnected herself from the cycler. Initially in the ED she was reportedly drowsy but this improved after 1L of IVF and 12 units regular insulin per ed team. When seen she was oriented, answers questions but isnt able to provide any significant history as far as her presentation. She did recall an episode where she became tense, states her eyes rolled to the back of her head and then it resolved. States she recalls that episode. During the encounter as she rolled onto her back her eyes rolled up, she became unresponsive, extremities became parents, had a brief cry. Followed by some convulsions. Was not responsive afterwards. She was seen again after 15 minutes and was answering some questions but was more confused than prior.     Informant(s) for H&P: Patient    REVIEW OF SYSTEMS:  A comprehensive 14 point review of systems was negative except for: what is in the HPI    PMH:  Past Medical History:   Diagnosis Date    Acute congestive heart failure ([address]     BC (acute kidney injury) ([address] 10/1/2019    Cephalgia 10/9/2019    Chronic kidney disease     Depression     Diabetes mellitus ([address]     Diabetic ketoacidosis ([address] 8/27/2011    Hemodialysis patient ([address]     History of blood transfusion 11/2019    Hyperlipidemia 10/8/2020    Hypothyroidism 10/8/2020    Iron deficiency anemia 10/1/2019    MDRO (multiple drug resistant organisms) resistance     MRSA (methicillin resistant Staphylococcus aureus)     back wound abcess    Non compliance w medication regimen 3/30/2016    Other disorders of kidney and ureter     Pregnancy 3/30/2016    16 weeks    Severe pre-eclampsia in third trimester 2016       Surgical History:  Past Surgical History:   Procedure Laterality Date    BACK SURGERY      abscess     SECTION      x2    CHOLECYSTECTOMY, LAPAROSCOPIC N/A 2019    CHOLECYSTECTOMY LAPAROSCOPIC performed by Kimberly Resendez MD at [address] [facility] N/A 2018    COLONOSCOPY WITH BIOPSY performed by Alexus Melgoza MD at [address] [facility] N/A 2018    COLONOSCOPY WITH BIOPSY performed by Srikanth Reyes MD at [address] [facility]  2012    EF 57%    ECHO COMPL W DOP COLOR FLOW  6/10/2013         EMBOLECTOMY N/A 2020    [address] [facility] [address], [place], [place] -- REQS ROOM 3 performed by Shane Bowden MD at [address] [facility] N/A 2020    LAPAROSCOPIC INSERTION PERITONEAL DIALYSIS CATHETER performed by Lord Zehra MD at [facility] [address] ESOPHAGOGASTRODUODENOSCOPY TRANSORAL DIAGNOSTIC N/A 2018    EGD ESOPHAGOGASTRODUODENOSCOPY performed by Alexus Melgoza MD at [id number] [place] [address] TRANSESOPHAGEAL ECHOCARDIOGRAM N/A 10/19/2020    TRANSESOPHAGEAL ECHOCARDIOGRAM WITH BUBBLE STUDY performed by Zayra Russell MD at [address] [facility] [address] [address]  2018    UPPER GASTROINTESTINAL ENDOSCOPY  2018    EGD BIOPSY performed by Srikanth Reyes MD at [address] [facility] 10/11/2019    EGD ESOPHAGOGASTRODUODENOSCOPY performed by Wayne Owens DO at [place] [address]       Medications Prior to Admission:    Prior to Admission medications    Medication Sig Start Date End Date Taking? Authorizing Provider   insulin glargine (LANTUS) 100 UNIT/ML injection vial Inject 6 Units into the skin nightly 11/11/20   Gloria Tamayo MD   insulin lispro (HUMALOG) 100 UNIT/ML injection vial Inject 2 Units into the skin 3 times daily (with meals) 11/11/20   Gloria Tamayo MD   insulin lispro (HUMALOG) 100 UNIT/ML injection vial Inject 0-4 Units into the skin 4 times daily (before meals and nightly) 11/11/20   Gloria Tamayo MD   levothyroxine (SYNTHROID) 75 MCG tablet Take 1 tablet by mouth every morning (before breakfast) 11/12/20   Gloria Tamayo MD   vancomycin (VANCOCIN) infusion Infuse 1,000 mg intravenously every 7 days for 14 days For 2 weeks ( #2) 11/10/20 11/24/20  Ishan Dee MD   Heparin Sod, Porcine, in D5W (HEPARIN, PORCINE,) 100 UNIT/ML SOLN infusion As per nomogram 10/24/20   Macy Walters MD   lactobacillus (CULTURELLE) CAPS capsule Take 1 capsule by mouth every 12 hours 10/24/20   Macy Walters MD   Insulin Syringes, Disposable, U-100 0.3 ML MISC To use daily 10/9/20   Fidelina Baxter MD   metOLazone (ZAROXOLYN) 5 MG tablet Take 5 mg by mouth daily    Historical Provider, MD   vitamin D (ERGOCALCIFEROL) 1.25 MG (17581 UT) CAPS capsule Take 50,000 Units by mouth once a week Saturday    Historical Provider, MD   Multiple Vitamins-Minerals (RENAPLEX-D) TABS Take 1 tablet by mouth daily    Historical Provider, MD   cloNIDine (CATAPRES) 0.1 MG tablet Take 0.1 mg by mouth 2 times daily as needed for High Blood Pressure    Historical Provider, MD   atorvastatin (LIPITOR) 40 MG tablet Take 1 tablet by mouth nightly 9/3/20   Radha Monk MD   dilTIAZem (CARDIZEM CD) 240 MG extended release capsule Take 1 capsule by mouth daily 9/3/20   Radha Monk MD   blood glucose test strips (ASCENSIA AUTODISC VI;ONE TOUCH ULTRA TEST VI) strip Indications: 5x a day Freestyle Lite -Tests 5 times daily and as needed for low glucose.   E10.10 7/29/20   Bennett Osler Edson Carlson MD   Lancets Thin MISC Indications: cks 5xa a day cks 5xa a day 7/29/20   Yane Cooper MD   epoetin nena-epbx (RETACRIT) 4000 UNIT/ML SOLN injection Inject 2 mLs into the skin three times a week Per Nephrology 2/17/20   HEBER Beatty - CNP   LORazepam (ATIVAN) 0.5 MG tablet Take 0.5 mg by mouth 2 times daily as needed for Anxiety. Historical Provider, MD   glucose (GLUTOSE) 40 % GEL Take 15 g by mouth as needed 4/1/16   Marcial Coker MD   Insulin Syringe-Needle U-100 (INSULIN SYRINGE .5CC/30GX1/2\") 30G X 1/2\" 0.5 ML MISC by Does not apply route. Indications: uses 6-7 a day    Historical Provider, MD       Allergies: Toradol [ketorolac tromethamine]    Social History:    reports that she has been smoking cigarettes. She has a 3.00 pack-year smoking history. She uses smokeless tobacco. She reports that she does not drink alcohol or use drugs. Family History:   family history includes Asthma in her brother and mother; Diabetes in her mother; High Blood Pressure in her father and mother; Hypertension in her mother.        PHYSICAL EXAM:  Vitals:  BP (!) 196/111   Pulse 85   Temp 97.8 °F (36.6 °C) (Axillary)   Resp 12   Wt 145 lb (65.8 kg)   SpO2 97%   BMI 24.89 kg/m²   General Appearance: alert and oriented to person, place and time and in no acute distress  Skin: warm and dry, turgor somewhat diminished  Head: normocephalic and atraumatic  Eyes: pupils equal, round, and reactive to light, extraocular eye movements intact, conjunctivae normal  Neck: neck supple and non tender without mass   Pulmonary/Chest: clear to auscultation bilaterally- no wheezes, rales or rhonchi, normal air movement, no respiratory distress  Cardiovascular: normal rate, normal S1 and S2 and 2 out of 6 systolic ejection murmur  Abdomen: soft, non-tender, non-distended, normal bowel sounds, no masses or organomegaly, PD catheter in place  Extremities: no cyanosis, no clubbing and no edema  Neurologic: no cranial nerve

## 2020-11-20 NOTE — ED PROVIDER NOTES
Patient is a 32 y/o female who presents to the ED via EMS with altered mental status. EMS suspects the patient is in diabetic ketoacidosis. No other history is available at this time. Review of Systems   Unable to perform ROS: Mental status change        Physical Exam  Vitals signs and nursing note reviewed. Constitutional:       General: She is not in acute distress. HENT:      Head: Normocephalic and atraumatic. Right Ear: External ear normal.      Left Ear: External ear normal.      Nose: Nose normal.      Mouth/Throat:      Mouth: Mucous membranes are moist.   Eyes:      Conjunctiva/sclera: Conjunctivae normal.      Pupils: Pupils are equal, round, and reactive to light. Neck:      Musculoskeletal: Normal range of motion and neck supple. Cardiovascular:      Rate and Rhythm: Normal rate and regular rhythm. Heart sounds: No murmur. Pulmonary:      Effort: Pulmonary effort is normal. No respiratory distress. Breath sounds: Normal breath sounds. No stridor. No wheezing, rhonchi or rales. Abdominal:      General: Bowel sounds are normal. There is no distension. Palpations: Abdomen is soft. Tenderness: There is no abdominal tenderness. There is no guarding. Musculoskeletal: Normal range of motion. General: No swelling. Skin:     General: Skin is warm and dry. Neurological:      Mental Status: She is disoriented.           Procedures     MDM                  --------------------------------------------- PAST HISTORY ---------------------------------------------  Past Medical History:  has a past medical history of Acute congestive heart failure ([facility] BC (acute kidney injury) ([facility] Cephalgia, Chronic kidney disease, Depression, Diabetes mellitus ([facility] Diabetic ketoacidosis ([facility] Hemodialysis patient ([facility] History of blood transfusion, Hyperlipidemia, Hypothyroidism, Iron deficiency anemia, MDRO (multiple drug resistant organisms) resistance, MRSA Lymphocytes % 19.8 (L) 20.0 - 42.0 %    Monocytes % 5.0 2.0 - 12.0 %    Eosinophils % 7.1 (H) 0.0 - 6.0 %    Basophils % 1.4 0.0 - 2.0 %    Neutrophils Absolute 4.76 1.80 - 7.30 E9/L    Immature Granulocytes # 0.02 E9/L    Lymphocytes Absolute 1.42 (L) 1.50 - 4.00 E9/L    Monocytes Absolute 0.36 0.10 - 0.95 E9/L    Eosinophils Absolute 0.51 (H) 0.05 - 0.50 E9/L    Basophils Absolute 0.10 0.00 - 0.20 E9/L   Serum Drug Screen   Result Value Ref Range    Ethanol Lvl <10 mg/dL    Acetaminophen Level <5.0 (L) 10.0 - 15.8 mcg/mL    Salicylate, Serum <1.8 0.0 - 30.0 mg/dL   pH, venous   Result Value Ref Range    pH, Khurram 7.35 7.35 - 7.45   SPECIMEN REJECTION   Result Value Ref Range    Rejected Test CMP,TROP,BHOB     Reason for Rejection see below    Troponin   Result Value Ref Range    Troponin 0.06 (H) 0.00 - 0.03 ng/mL   Beta-Hydroxybutyrate   Result Value Ref Range    Beta-Hydroxybutyrate 0.47 (H) 0.02 - 0.27 mmol/L   Phosphorus   Result Value Ref Range    Phosphorus 8.0 (H) 2.5 - 4.5 mg/dL   Comprehensive Metabolic Panel w/ Reflex to MG   Result Value Ref Range    Sodium 130 (L) 132 - 146 mmol/L    Potassium reflex Magnesium 3.5 3.5 - 5.0 mmol/L    Chloride 90 (L) 98 - 107 mmol/L    CO2 24 22 - 29 mmol/L    Anion Gap 16 7 - 16 mmol/L    Glucose 1,040 (HH) 74 - 99 mg/dL    BUN 47 (H) 6 - 20 mg/dL    CREATININE 7.3 (H) 0.5 - 1.0 mg/dL    GFR Non-African American 8 >=60 mL/min/1.73    GFR African American 8     Calcium 8.0 (L) 8.6 - 10.2 mg/dL    Total Protein 5.6 (L) 6.4 - 8.3 g/dL    Alb 3.2 (L) 3.5 - 5.2 g/dL    Total Bilirubin 0.2 0.0 - 1.2 mg/dL    Alkaline Phosphatase 143 (H) 35 - 104 U/L    ALT 5 0 - 32 U/L    AST 18 0 - 31 U/L   Magnesium   Result Value Ref Range    Magnesium 2.6 1.6 - 2.6 mg/dL   POCT Glucose   Result Value Ref Range    Meter Glucose >500 (H) 74 - 99 mg/dL   POCT Glucose   Result Value Ref Range    Meter Glucose >500 (H) 74 - 99 mg/dL       RADIOLOGY:  No orders to display       EKG:   This EKG is signed and interpreted by me. Rate: 85  Rhythm: Sinus  Interpretation: non-specific EKG and prolonged QT interval  Comparison: no previous EKG available      ------------------------- NURSING NOTES AND VITALS REVIEWED ---------------------------  Date / Time Roomed:  11/20/2020 12:20 AM  ED Bed Assignment:  02/02    The nursing notes within the ED encounter and vital signs as below have been reviewed. Patient Vitals for the past 24 hrs:   BP Temp Temp src Pulse Resp SpO2 Weight   11/20/20 0446 (!) 196/111 -- -- 85 12 97 % --   11/20/20 0342 (!) 197/105 -- -- 84 14 96 % --   11/20/20 0138 (!) 196/112 -- -- 85 9 100 % --   11/20/20 0024 -- 97.8 °F (36.6 °C) Axillary -- -- -- --   11/20/20 0021 (!) 207/120 -- -- 89 16 94 % 145 lb (65.8 kg)       Oxygen Saturation Interpretation: Normal    ------------------------------------------ PROGRESS NOTES ------------------------------------------  Re-evaluation(s):  Time: 0500. Patients symptoms are improving  Repeat physical examination is improved    Counseling:  I have spoken with the patient and discussed todays results, in addition to providing specific details for the plan of care and counseling regarding the diagnosis and prognosis. Their questions are answered at this time and they are agreeable with the plan of admission.    --------------------------------- ADDITIONAL PROVIDER NOTES ---------------------------------  Consultations:  Time: 1783. Spoke with Dr. Rod Cordero. Discussed case. They will admit the patient. This patient's ED course included: a personal history and physicial examination, re-evaluation prior to disposition, multiple bedside re-evaluations, IV medications, cardiac monitoring and continuous pulse oximetry    This patient has remained hemodynamically stable during their ED course. Diagnosis:  1. Type 1 diabetes mellitus with ketoacidosis without coma ([facility]    2.  ESRF (end stage renal failure) ([facility])        Disposition:  Patient's disposition: Admit to CCU/ICU  Patient's condition is stable. Please note that the withdrawal or failure to initiate urgent interventions for this patient would likely result in a life threatening deterioration or permanent disability. Accordingly this patient received 35 minutes of critical care time, excluding separately billable procedures.          Bernabe Franz DO  11/20/20 0600

## 2020-11-20 NOTE — PROCEDURES
HARRISON Power PICC RIght arm Placement 11/20/2020    Product number: [id number]   Lot Number: [id number]   Consult no access   Ultrasound: yes/VPS   R Brachial      Upper Arm Circumference: 28CM    Size: 5F/50CM   Exposed Length: 4CM   Internal Length: 32CM   Cut: 15CM   Vein Measurement: 0.55CM   Bullseye obtained with VPS, tip of catheter in the lower 1/3 of the SVC at the CAJ, pt le well, tearful, emotional support given, site intact scant amt of bleeding at insertion site, pressure held, dressed, ports times 2 flushed with NS, clamped and alcohol caps applied, RN aware okay to use    Verónica Ramos  11/20/2020  1:39 PM

## 2020-11-20 NOTE — PROGRESS NOTES
Patient was educated on the reasoning for seizure precaution pads to be placed on bed rails. Despite this education, patient refused them at this time. Rn notified. Will continue to monitor per rounding routine.

## 2020-11-20 NOTE — PROGRESS NOTES
Aware of consult, sent perfect serve message need nephrology okay for PICC line, also spoke with Dr Miri Fernandez on 6th floor, he is aware need okay from nephrology, awaiting return response to place.

## 2020-11-20 NOTE — ED NOTES
Message left for Dr. Julio Ortiz about Critical lab      Celia Chatterjee, [facility]  11/20/20 [phone]

## 2020-11-20 NOTE — ED NOTES
Dr. Di Rivero, on [facility] hospitalist called about central line in patient.  PT still has no central line due to other pts needing it done, Dr states Tad Anger sure they do it, or let Dr. Xuan Mims know it needs done\"      Yolanda Andrew, RN  11/20/20 99 Julio Petit, RN  11/20/20 2834

## 2020-11-21 LAB
ACETAMINOPHEN LEVEL: <5 MCG/ML (ref 10–30)
ANION GAP SERPL CALCULATED.3IONS-SCNC: 16 MMOL/L (ref 7–16)
BASOPHILS ABSOLUTE: 0.13 E9/L (ref 0–0.2)
BASOPHILS RELATIVE PERCENT: 1.4 % (ref 0–2)
BUN BLDV-MCNC: 55 MG/DL (ref 6–20)
CALCIUM SERPL-MCNC: 7.9 MG/DL (ref 8.6–10.2)
CHLORIDE BLD-SCNC: 99 MMOL/L (ref 98–107)
CO2: 23 MMOL/L (ref 22–29)
CREAT SERPL-MCNC: 8 MG/DL (ref 0.5–1)
EOSINOPHILS ABSOLUTE: 0.64 E9/L (ref 0.05–0.5)
EOSINOPHILS RELATIVE PERCENT: 6.9 % (ref 0–6)
ETHANOL: <10 MG/DL (ref 0–0.08)
GFR AFRICAN AMERICAN: 7
GFR NON-AFRICAN AMERICAN: 7 ML/MIN/1.73
GLUCOSE BLD-MCNC: 30 MG/DL (ref 74–99)
HCT VFR BLD CALC: 28.6 % (ref 34–48)
HEMOGLOBIN: 9.6 G/DL (ref 11.5–15.5)
IMMATURE GRANULOCYTES #: 0.02 E9/L
IMMATURE GRANULOCYTES %: 0.2 % (ref 0–5)
LYMPHOCYTES ABSOLUTE: 2.5 E9/L (ref 1.5–4)
LYMPHOCYTES RELATIVE PERCENT: 27 % (ref 20–42)
MAGNESIUM: 2.4 MG/DL (ref 1.6–2.6)
MCH RBC QN AUTO: 29.7 PG (ref 26–35)
MCHC RBC AUTO-ENTMCNC: 33.6 % (ref 32–34.5)
MCV RBC AUTO: 88.5 FL (ref 80–99.9)
METER GLUCOSE: 109 MG/DL (ref 74–99)
METER GLUCOSE: 195 MG/DL (ref 74–99)
METER GLUCOSE: 219 MG/DL (ref 74–99)
METER GLUCOSE: 301 MG/DL (ref 74–99)
METER GLUCOSE: 74 MG/DL (ref 74–99)
METER GLUCOSE: 90 MG/DL (ref 74–99)
METER GLUCOSE: <40 MG/DL (ref 74–99)
MONOCYTES ABSOLUTE: 0.5 E9/L (ref 0.1–0.95)
MONOCYTES RELATIVE PERCENT: 5.4 % (ref 2–12)
NEUTROPHILS ABSOLUTE: 5.48 E9/L (ref 1.8–7.3)
NEUTROPHILS RELATIVE PERCENT: 59.1 % (ref 43–80)
PDW BLD-RTO: 14.6 FL (ref 11.5–15)
PLATELET # BLD: 157 E9/L (ref 130–450)
PMV BLD AUTO: 10 FL (ref 7–12)
POTASSIUM SERPL-SCNC: 2.9 MMOL/L (ref 3.5–5)
RBC # BLD: 3.23 E12/L (ref 3.5–5.5)
SALICYLATE, SERUM: <0.3 MG/DL (ref 0–30)
SODIUM BLD-SCNC: 138 MMOL/L (ref 132–146)
TRICYCLIC ANTIDEPRESSANTS SCREEN SERUM: NEGATIVE NG/ML
WBC # BLD: 9.3 E9/L (ref 4.5–11.5)

## 2020-11-21 PROCEDURE — 6370000000 HC RX 637 (ALT 250 FOR IP): Performed by: INTERNAL MEDICINE

## 2020-11-21 PROCEDURE — 6360000002 HC RX W HCPCS: Performed by: INTERNAL MEDICINE

## 2020-11-21 PROCEDURE — 85025 COMPLETE CBC W/AUTO DIFF WBC: CPT

## 2020-11-21 PROCEDURE — 2580000003 HC RX 258: Performed by: INTERNAL MEDICINE

## 2020-11-21 PROCEDURE — 36415 COLL VENOUS BLD VENIPUNCTURE: CPT

## 2020-11-21 PROCEDURE — 82962 GLUCOSE BLOOD TEST: CPT

## 2020-11-21 PROCEDURE — 80048 BASIC METABOLIC PNL TOTAL CA: CPT

## 2020-11-21 PROCEDURE — 2060000000 HC ICU INTERMEDIATE R&B

## 2020-11-21 PROCEDURE — 83735 ASSAY OF MAGNESIUM: CPT

## 2020-11-21 PROCEDURE — 99233 SBSQ HOSP IP/OBS HIGH 50: CPT | Performed by: INTERNAL MEDICINE

## 2020-11-21 PROCEDURE — P9047 ALBUMIN (HUMAN), 25%, 50ML: HCPCS | Performed by: INTERNAL MEDICINE

## 2020-11-21 PROCEDURE — 90945 DIALYSIS ONE EVALUATION: CPT

## 2020-11-21 PROCEDURE — 6370000000 HC RX 637 (ALT 250 FOR IP): Performed by: NURSE PRACTITIONER

## 2020-11-21 PROCEDURE — 3E1M39Z IRRIGATION OF PERITONEAL CAVITY USING DIALYSATE, PERCUTANEOUS APPROACH: ICD-10-PCS | Performed by: INTERNAL MEDICINE

## 2020-11-21 RX ORDER — POTASSIUM CHLORIDE 29.8 MG/ML
20 INJECTION INTRAVENOUS
Status: DISCONTINUED | OUTPATIENT
Start: 2020-11-21 | End: 2020-11-21

## 2020-11-21 RX ORDER — LOPERAMIDE HYDROCHLORIDE 2 MG/1
4 CAPSULE ORAL ONCE
Status: COMPLETED | OUTPATIENT
Start: 2020-11-21 | End: 2020-11-21

## 2020-11-21 RX ORDER — ALBUMIN (HUMAN) 12.5 G/50ML
25 SOLUTION INTRAVENOUS ONCE
Status: COMPLETED | OUTPATIENT
Start: 2020-11-21 | End: 2020-11-21

## 2020-11-21 RX ORDER — INSULIN GLARGINE 100 [IU]/ML
6 INJECTION, SOLUTION SUBCUTANEOUS NIGHTLY
Status: DISCONTINUED | OUTPATIENT
Start: 2020-11-21 | End: 2020-11-22 | Stop reason: HOSPADM

## 2020-11-21 RX ORDER — OXCARBAZEPINE 300 MG/1
300 TABLET, FILM COATED ORAL 2 TIMES DAILY
Status: DISCONTINUED | OUTPATIENT
Start: 2020-11-21 | End: 2020-11-22 | Stop reason: HOSPADM

## 2020-11-21 RX ORDER — POTASSIUM CHLORIDE 29.8 MG/ML
20 INJECTION INTRAVENOUS
Status: COMPLETED | OUTPATIENT
Start: 2020-11-21 | End: 2020-11-21

## 2020-11-21 RX ADMIN — BUMETANIDE 2 MG: 1 TABLET ORAL at 10:19

## 2020-11-21 RX ADMIN — ATORVASTATIN CALCIUM 40 MG: 40 TABLET, FILM COATED ORAL at 22:28

## 2020-11-21 RX ADMIN — POTASSIUM CHLORIDE 20 MEQ: 29.8 INJECTION, SOLUTION INTRAVENOUS at 19:20

## 2020-11-21 RX ADMIN — ALBUMIN (HUMAN) 25 G: 0.25 INJECTION, SOLUTION INTRAVENOUS at 17:07

## 2020-11-21 RX ADMIN — LEVOTHYROXINE SODIUM 75 MCG: 0.07 TABLET ORAL at 05:04

## 2020-11-21 RX ADMIN — METOPROLOL TARTRATE 100 MG: 25 TABLET, FILM COATED ORAL at 10:19

## 2020-11-21 RX ADMIN — Medication 10 ML: at 10:19

## 2020-11-21 RX ADMIN — INSULIN GLARGINE 10 UNITS: 100 INJECTION, SOLUTION SUBCUTANEOUS at 10:22

## 2020-11-21 RX ADMIN — LOPERAMIDE HYDROCHLORIDE 4 MG: 2 CAPSULE ORAL at 17:16

## 2020-11-21 RX ADMIN — DEXTROSE MONOHYDRATE 12.5 G: 25 INJECTION, SOLUTION INTRAVENOUS at 04:47

## 2020-11-21 RX ADMIN — MULTIPLE VITAMINS W/ MINERALS TAB 1 TABLET: TAB at 10:19

## 2020-11-21 RX ADMIN — INSULIN LISPRO 2 UNITS: 100 INJECTION, SOLUTION INTRAVENOUS; SUBCUTANEOUS at 13:51

## 2020-11-21 RX ADMIN — METOLAZONE 5 MG: 2.5 TABLET ORAL at 10:19

## 2020-11-21 RX ADMIN — DILTIAZEM HYDROCHLORIDE 240 MG: 240 CAPSULE, COATED, EXTENDED RELEASE ORAL at 10:19

## 2020-11-21 RX ADMIN — METOPROLOL TARTRATE 100 MG: 25 TABLET, FILM COATED ORAL at 22:27

## 2020-11-21 RX ADMIN — POTASSIUM CHLORIDE 20 MEQ: 29.8 INJECTION, SOLUTION INTRAVENOUS at 17:32

## 2020-11-21 RX ADMIN — INSULIN LISPRO 4 UNITS: 100 INJECTION, SOLUTION INTRAVENOUS; SUBCUTANEOUS at 13:50

## 2020-11-21 RX ADMIN — SODIUM CHLORIDE, PRESERVATIVE FREE 300 UNITS: 5 INJECTION INTRAVENOUS at 10:21

## 2020-11-21 RX ADMIN — LISINOPRIL 20 MG: 10 TABLET ORAL at 10:19

## 2020-11-21 RX ADMIN — BUMETANIDE 2 MG: 1 TABLET ORAL at 22:28

## 2020-11-21 RX ADMIN — SODIUM CHLORIDE, PRESERVATIVE FREE 300 UNITS: 5 INJECTION INTRAVENOUS at 22:33

## 2020-11-21 RX ADMIN — INSULIN LISPRO 8 UNITS: 100 INJECTION, SOLUTION INTRAVENOUS; SUBCUTANEOUS at 00:31

## 2020-11-21 ASSESSMENT — PAIN SCALES - GENERAL
PAINLEVEL_OUTOF10: 0
PAINLEVEL_OUTOF10: 0

## 2020-11-21 NOTE — CONSULTS
History Of Present Illness: CHIEF COMPLAINT:  Altered mental status     History of Present Illness: 77-year-old female with a history of ESRD on peritoneal dialysis, hypertension, hypothyroidism, prior substance abuse, diabetes mellitus type 1. 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 Discharged on vancomycin 1 g every week x 2. She uses peritoneal cycler at night, she isnt sure if she connected herself yesterday. She isnt able to state how she came to the ED, apparently family members found her on the floor unresponsive after she had ?disconnected herself from the cycler. Initially in the ED she was reportedly drowsy but this improved after 1L of IVF and 12 units regular insulin per ed team. When seen she was oriented, answers questions but isnt able to provide any significant history as far as her presentation. She did recall an episode where she became tense, states her eyes rolled to the back of her head and then it resolved. States she recalls that episode. During the encounter as she rolled onto her back her eyes rolled up, she became unresponsive, extremities became parents, had a brief cry. Followed by some convulsions. Was not responsive afterwards. She was seen again after 15 minutes and was answering some questions but was more confused than prior. As above per hospitalist.  Patient is interviewed and indicates past seizures that were associated with low blood sugars. She has not been prescribed anticonvulsant therapy. The patient is a 31 y.o. female with significant past medical history of see above and below who presents with above.       The patient has the following symptoms:    Change in level of consciousness: alert    New Weakness: no    Numbness or Tingling: no    Difficulty Swallowing: no    Current Medications:   Scheduled Meds:   atorvastatin  40 mg Oral Nightly    dilTIAZem 240 mg Oral Daily    levothyroxine  75 mcg Oral QAM AC    therapeutic multivitamin-minerals  1 tablet Oral Daily    sodium chloride flush  10 mL Intravenous 2 times per day    lidocaine PF  5 mL Intradermal Once    heparin flush  3 mL Intravenous 2 times per day    metOLazone  5 mg Oral Daily    [Held by provider] epoetin nena-epbx  4,000 Units Subcutaneous Once per day on Mon Wed Fri    bumetanide  2 mg Oral BID    lisinopril  20 mg Oral Daily    metoprolol tartrate  100 mg Oral BID    insulin glargine  10 Units Subcutaneous BID    insulin lispro  4 Units Subcutaneous TID WC    insulin lispro  0-12 Units Subcutaneous Q4H    insulin lispro  0-6 Units Subcutaneous Nightly     Continuous Infusions:   dextrose       PRN Meds:sodium chloride flush, acetaminophen **OR** acetaminophen, polyethylene glycol, sodium chloride flush, heparin flush, glucose, dextrose, glucagon (rDNA), dextrose    Allergies:  Cefepime and Toradol [ketorolac tromethamine]    Social History:   TOBACCO:   reports that she has been smoking cigarettes. She has a 3.00 pack-year smoking history. She uses smokeless tobacco.  ETOH:   reports no history of alcohol use.     Past Medical History:        Diagnosis Date    Acute congestive heart failure ([address]     BC (acute kidney injury) ([facility] 10/1/2019    Cephalgia 10/9/2019    Chronic kidney disease     Depression     Diabetes mellitus ([address]     Diabetic ketoacidosis ([facility] 8/27/2011    Hemodialysis patient ([facility]     History of blood transfusion 11/2019    Hyperlipidemia 10/8/2020    Hypothyroidism 10/8/2020    Iron deficiency anemia 10/1/2019    MDRO (multiple drug resistant organisms) resistance     MRSA (methicillin resistant Staphylococcus aureus)     back wound abcess    Non compliance w medication regimen 3/30/2016    Other disorders of kidney and ureter     Pregnancy 3/30/2016    16 weeks    Seizure ([facility] 11/20/2020    Severe pre-eclampsia in third trimester 8/22/2016 Past Surgical History:        Procedure Laterality Date    BACK SURGERY      abscess     SECTION      x2    CHOLECYSTECTOMY, LAPAROSCOPIC N/A 2019    CHOLECYSTECTOMY LAPAROSCOPIC performed by Loida Simon MD at [address] N/A 2018    COLONOSCOPY WITH BIOPSY performed by Rosa Peacock MD at [address] N/A 2018    COLONOSCOPY WITH BIOPSY performed by Julieta Iverson MD at [address]  2012    EF 57%    ECHO COMPL W DOP COLOR FLOW  6/10/2013         EMBOLECTOMY N/A 2020    [address], [facility], [place] -- REQS ROOM 3 performed by Rod Ch MD at [facility] N/A 2020    LAPAROSCOPIC INSERTION PERITONEAL DIALYSIS CATHETER performed by Raysa Vasquez MD at [facility] ESOPHAGOGASTRODUODENOSCOPY TRANSORAL DIAGNOSTIC N/A 2018    EGD ESOPHAGOGASTRODUODENOSCOPY performed by Rosa Peacock MD at [address] [facility] TRANSESOPHAGEAL ECHOCARDIOGRAM N/A 10/19/2020    TRANSESOPHAGEAL ECHOCARDIOGRAM WITH BUBBLE STUDY performed by Lillian Arteaga MD at [address] [facility] [address] [address]  2018    UPPER GASTROINTESTINAL ENDOSCOPY  2018    EGD BIOPSY performed by Julieta Iverson MD at [place] [address] N/A 10/11/2019    EGD ESOPHAGOGASTRODUODENOSCOPY performed by Marlene Olivia DO at [place] [address]         Outside reports reviewed: ER records, historical medical records, lab reports and radiology reports. Patient's medications, allergies, past medical, surgical, social and family histories were reviewed and updated as appropriate. Review of Systems  A comprehensive review of systems was negative except for:       Objective:     Neuro exam 132/76, pulse 64, she is afebrile  General: awake and alert. Cranial nerve testing was normal.  Funduscopic eye exam revealed not testable.   Motor exam: 5-/5.  Deep tendon reflexes were absent bilaterally. Plantar responses were flexor bilaterally. Cerebellar exam noted finger to nose without dysmetria. Sensation was normal to joint position sense, light touch and a pin prick . Donny Phillips Assessment:   Seizure with significant hyperosmolar/metabolic encephalopathy in a patient with recently diagnosed endocarditis and borderline abnormal MRI of brain with negative head CT and nonfocal exam.      Plan:   Discussed starting anticonvulsant therapy and she is receptive to doing this. We will start oxcarbazepine which is not renally excreted. We will check EEG and she is advised against driving. Consider infectious disease opinion regarding abnormal MRI of brain. It is reassuring that head CT was negative and may want to consider a repeat MRI of brain especially if there is any decompensation.   Thank you for consultation

## 2020-11-21 NOTE — PROGRESS NOTES
[address] [place], [place] [address]ist   Progress Note    Admitting Date and Time: 11/20/2020 12:20 AM  Admit Dx: Hyperosmolar hyperglycemic state (HHS) ([address] [E11.00, E11.65]    Subjective:    Pt feels like she needs Imodium for her diarrhea. She takes it at home all the time. She also is reluctant to take new anti-seizure medication as it would be yet another medication. Per RN: Insulin regimen ordered is different from what is ordered here.        OXcarbazepine  300 mg Oral BID    albumin human  25 g Intravenous Once    potassium chloride  20 mEq Intravenous Q1H    loperamide  4 mg Oral Once    [START ON 11/22/2020] insulin lispro  2 Units Subcutaneous TID WC    insulin glargine  6 Units Subcutaneous Nightly    insulin lispro  0-4 Units Subcutaneous 4x Daily AC & HS    atorvastatin  40 mg Oral Nightly    dilTIAZem  240 mg Oral Daily    levothyroxine  75 mcg Oral QAM AC    therapeutic multivitamin-minerals  1 tablet Oral Daily    sodium chloride flush  10 mL Intravenous 2 times per day    lidocaine PF  5 mL Intradermal Once    heparin flush  3 mL Intravenous 2 times per day    metOLazone  5 mg Oral Daily    [Held by provider] epoetin nena-epbx  4,000 Units Subcutaneous Once per day on Mon Wed Fri    bumetanide  2 mg Oral BID    lisinopril  20 mg Oral Daily    metoprolol tartrate  100 mg Oral BID     sodium chloride flush, 10 mL, PRN  acetaminophen, 650 mg, Q6H PRN    Or  acetaminophen, 650 mg, Q6H PRN  polyethylene glycol, 17 g, Daily PRN  sodium chloride flush, 10 mL, PRN  heparin flush, 3 mL, PRN  glucose, 15 g, PRN  dextrose, 12.5 g, PRN  glucagon (rDNA), 1 mg, PRN  dextrose, 100 mL/hr, PRN         Objective:    BP (!) 185/94   Pulse 84   Temp 98.1 °F (36.7 °C) (Oral)   Resp 16   Ht 5' 4\" (1.626 m)   Wt 145 lb 1.6 oz (65.8 kg)   SpO2 99%   BMI 24.91 kg/m²   General Appearance: alert and oriented to person, place and time and in no acute distress  Skin: warm and dry  Head: normocephalic and atraumatic  Eyes: pupils equal, round, and reactive to light, extraocular eye movements intact, conjunctivae normal  Neck: neck supple and non tender without mass   Pulmonary/Chest: clear to auscultation bilaterally- no wheezes, rales or rhonchi, normal air movement, no respiratory distress  Cardiovascular: normal rate, normal S1 and S2 and no carotid bruits  Abdomen: soft, non-tender, non-distended, normal bowel sounds, no masses or organomegaly  Extremities: no cyanosis, no clubbing and no edema  Neurologic: no cranial nerve deficit and speech normal      Recent Labs     11/20/20  0250 11/20/20  1509 11/21/20  0445   *  --  138   K 3.5  --  2.9*   CL 90*  --  99   CO2 24  --  23   BUN 47*  --  55*   CREATININE 7.3*  --  8.0*   GLUCOSE 1,040* 269 30*   CALCIUM 8.0*  --  7.9*       Recent Labs     11/20/20  0250   ALKPHOS 143*   PROT 5.6*   LABALBU 3.2*   BILITOT 0.2   AST 18   ALT 5       Recent Labs     11/20/20  0045 11/21/20  0445   WBC 7.2 9.3   RBC 3.43* 3.23*   HGB 10.3* 9.6*   HCT 32.3* 28.6*   MCV 94.2 88.5   MCH 30.0 29.7   MCHC 31.9* 33.6   RDW 16.1* 14.6    157   MPV 11.3 10.0       Radiology:   XR CHEST PORTABLE   Final Result   No pneumothorax, post right-sided PICC line catheter placement. CT HEAD WO CONTRAST   Final Result   No acute intracranial abnormality. MRI BRAIN WO CONTRAST   Final Result   Interval resolution of relatively symmetric linear restricted diffusion   within the bilateral frontal parietal deep white matter with interval   development patchy FLAIR hyperintensities within the periventricular, deep,   and subcortical white matter, left greater than right. Findings are   nonspecific with primary diagnostic consideration given to infectious versus   metabolic encephalopathy. Correlation with CSF analysis may be of benefit.           Assessment:  Principal Problem:    Seizure ([facility]  Active Problems:    Poorly controlled type 1 diabetes mellitus ([facility]    ESRD on peritoneal dialysis ([facility]    Opioid abuse, episodic ([facility]    Acute metabolic encephalopathy    Endocarditis    Hyperosmolar hyperglycemic state ([facility]) ([facility]  Resolved Problems:    * No resolved hospital problems. *      Plan:    1. Type I DM with hyperglycemia--she never required an insulin drip. Resumed home regimen but I adjusted values today based on age is made by endocrinologist her last admission. She actually has a follow-up appointment 11/23. 2. Recent staph epidermidis endocarditis--was supposed to complete outpatient antibiotics of vancomycin 1 g weekly x2. That was due on the 18th and 25th. She missed the 18th and was then dosed here on admission  3. Possible seizure--patient was seen by neurology this morning and patient was agreeable at that point to anticonvulsant therapy. There will start her on oxcarbazepine he had plan on starting her on oxcarbazepine and getting an EEG  4. End-stage renal disease on peritoneal dialysis--nephrology ordered and patient getting her treatment at night. 5. Hypokalemia--K 2.9 Will give her 20 mEq KCL IV  X 2.   6. Diarrhea--we will give her a one-time dose of Imodium 4 mg x 1. However, I do want to check a stool for C. difficile as she has had a prior history of that. NOTE: This report was transcribed using voice recognition software. Every effort was made to ensure accuracy; however, inadvertent computerized transcription errors may be present.      Electronically signed by Kena Peña MD on 11/21/2020 at 5:10 PM

## 2020-11-21 NOTE — PLAN OF CARE
Problem: Falls - Risk of:  Goal: Will remain free from falls  Description: Will remain free from falls  11/21/2020 0139 by Duc Barrientos RN  Outcome: Met This Shift     Problem: Falls - Risk of:  Goal: Absence of physical injury  Description: Absence of physical injury  11/21/2020 0139 by Duc Barrientos RN  Outcome: Met This Shift     Problem: Sensory Perception - Impaired:  Goal: Ability to maintain a stable neurologic state will improve  Description: Ability to maintain a stable neurologic state will improve  11/21/2020 0139 by Duc Barrientos RN  Outcome: Met This Shift

## 2020-11-21 NOTE — PROGRESS NOTES
Department of Internal Medicine  Nephrology Progress  Note    Subjective:   alert but confused.  NAD    Current Medications:    Current Facility-Administered Medications: OXcarbazepine (TRILEPTAL) tablet 300 mg, 300 mg, Oral, BID  atorvastatin (LIPITOR) tablet 40 mg, 40 mg, Oral, Nightly  dilTIAZem (CARDIZEM CD) extended release capsule 240 mg, 240 mg, Oral, Daily  levothyroxine (SYNTHROID) tablet 75 mcg, 75 mcg, Oral, QAM AC  therapeutic multivitamin-minerals 1 tablet, 1 tablet, Oral, Daily  sodium chloride flush 0.9 % injection 10 mL, 10 mL, Intravenous, 2 times per day  sodium chloride flush 0.9 % injection 10 mL, 10 mL, Intravenous, PRN  acetaminophen (TYLENOL) tablet 650 mg, 650 mg, Oral, Q6H PRN **OR** acetaminophen (TYLENOL) suppository 650 mg, 650 mg, Rectal, Q6H PRN  polyethylene glycol (GLYCOLAX) packet 17 g, 17 g, Oral, Daily PRN  lidocaine PF 1 % injection 5 mL, 5 mL, Intradermal, Once  sodium chloride flush 0.9 % injection 10 mL, 10 mL, Intravenous, PRN  heparin flush 100 UNIT/ML injection 300 Units, 3 mL, Intravenous, 2 times per day  heparin flush 100 UNIT/ML injection 300 Units, 3 mL, Intracatheter, PRN  metOLazone (ZAROXOLYN) tablet 5 mg, 5 mg, Oral, Daily  [Held by provider] epoetin nena-epbx (RETACRIT) injection 4,000 Units, 4,000 Units, Subcutaneous, Once per day on Mon Wed Fri  bumetanide (BUMEX) tablet 2 mg, 2 mg, Oral, BID  lisinopril (PRINIVIL;ZESTRIL) tablet 20 mg, 20 mg, Oral, Daily  metoprolol tartrate (LOPRESSOR) tablet 100 mg, 100 mg, Oral, BID  insulin glargine (LANTUS) injection vial 10 Units, 10 Units, Subcutaneous, BID  glucose (GLUTOSE) 40 % oral gel 15 g, 15 g, Oral, PRN  dextrose 50 % IV solution, 12.5 g, Intravenous, PRN  glucagon (rDNA) injection 1 mg, 1 mg, Intramuscular, PRN  dextrose 5 % solution, 100 mL/hr, Intravenous, PRN  insulin lispro (HUMALOG) injection vial 4 Units, 4 Units, Subcutaneous, TID WC  insulin lispro (HUMALOG) injection vial 0-12 Units, 0-12 Units, Subcutaneous, Q4H  insulin lispro (HUMALOG) injection vial 0-6 Units, 0-6 Units, Subcutaneous, Nightly  Allergies:  Cefepime and Toradol [ketorolac tromethamine]    Social History:    TOBACCO:   reports that she has been smoking cigarettes. She has a 3.00 pack-year smoking history. She uses smokeless tobacco.  ETOH:   reports no history of alcohol use. DRUGS:   reports no history of drug use.     Family History:       Problem Relation Age of Onset   [facility] Asthma Mother     Hypertension Mother     High Blood Pressure Mother     Diabetes Mother     Asthma Brother     High Blood Pressure Father      REVIEW OF SYSTEMS:    Pertinent positives and negatives are stated within HPI, all other systems reviewed and are negative    PHYSICAL EXAM:      Vitals:    VITALS:  BP (!) 185/94   Pulse 84   Temp 98.1 °F (36.7 °C) (Oral)   Resp 16   Ht 5' 4\" (1.626 m)   Wt 145 lb 1.6 oz (65.8 kg)   SpO2 99%   BMI 24.91 kg/m²   24HR INTAKE/OUTPUT:      Intake/Output Summary (Last 24 hours) at 11/21/2020 1513  Last data filed at 11/21/2020 1433  Gross per 24 hour   Intake 973 ml   Output --   Net 973 ml       PD Catheter Exam:  PD catheter exit site clean    Constitutional:  disoriented  HEENT:  Normocephalic, PERRL  Respiratory:  CTA bilaterally  Cardiovascular/Edema:  RRR, S1/S2  Gastrointestinal:  Soft, PD catheter exit site clean   Neurologic:  Nonfocal, AVELAR  Skin:  Warm, dry, no lesions  Other:  No edema    DATA:    CBC with Differential:    Lab Results   Component Value Date    WBC 9.3 11/21/2020    RBC 3.23 11/21/2020    HGB 9.6 11/21/2020    HCT 28.6 11/21/2020     11/21/2020    MCV 88.5 11/21/2020    MCH 29.7 11/21/2020    MCHC 33.6 11/21/2020    RDW 14.6 11/21/2020    NRBC 0.9 08/10/2020    SEGSPCT 67 06/27/2013    METASPCT 1.7 08/10/2020    LYMPHOPCT 27.0 11/21/2020    MONOPCT 5.4 11/21/2020    BASOPCT 1.4 11/21/2020    MONOSABS 0.50 11/21/2020    LYMPHSABS 2.50 11/21/2020    EOSABS 0.64 11/21/2020    BASOSABS 0.13 11/21/2020     CMP:    Lab Results   Component Value Date     11/21/2020    K 2.9 11/21/2020    K 3.5 11/20/2020    CL 99 11/21/2020    CO2 23 11/21/2020    BUN 55 11/21/2020    CREATININE 8.0 11/21/2020    GFRAA 7 11/21/2020    LABGLOM 7 11/21/2020    GLUCOSE 30 11/21/2020    GLUCOSE 130 05/18/2012    PROT 5.6 11/20/2020    LABALBU 3.2 11/20/2020    LABALBU 4.1 05/18/2012    CALCIUM 7.9 11/21/2020    BILITOT 0.2 11/20/2020    ALKPHOS 143 11/20/2020    AST 18 11/20/2020    ALT 5 11/20/2020     Magnesium:    Lab Results   Component Value Date    MG 2.4 11/21/2020     Phosphorus:    Lab Results   Component Value Date    PHOS 8.0 11/20/2020     Radiology Review:      CT head 11/20/20    No acute intracranial abnormality. MRI Brain 11/20/20   Interval resolution of relatively symmetric linear restricted diffusion    within the bilateral frontal parietal deep white matter with interval    development patchy FLAIR hyperintensities within the periventricular, deep,    and subcortical white matter, left greater than right.  Findings are    nonspecific with primary diagnostic consideration given to infectious versus    metabolic encephalopathy.  Correlation with CSF analysis may be of benefit. IMPRESSION/RECOMMENDATIONS:    Ten Hodges is a 28 year-old female with history of ESRD secondary to hypertensive nephrosclerosis diabetic nephropathy, on Peritoneal Dialysis 10 liters/day with 4 manual exchanges of 2 liters, 1.5% every 9 hours/Cycle, last fill 2L of 2.5%, with severe proteinuria, poorly controlled type I DM with multiple admissions for DKA, gastroparesis, HTN who presented to the ER this morning via EMS with altered mental status secondary to diabetic ketoacidosis. Per family she had completed peritoneal dialysis and was then found on the floor. EMS reported her glucose was critically high.  She was recently discharged from the hospital 9 days ago after admission for infective

## 2020-11-21 NOTE — PROGRESS NOTES
Attempted to retrieve pt's weight this AM. Bed scale has not been zeroed out properly & pt does not want to get out of bed to re-zero the scale at this time. Educated pt on importance of accurate weight & explained to pt that we will need to get her weight at some point this morning.  Electronically signed by Nikko Gutierres RN on 11/21/2020 at 5:22 AM

## 2020-11-21 NOTE — PLAN OF CARE
Problem: Pain:  Goal: Pain level will decrease  Description: Pain level will decrease  11/20/2020 2309 by Jose Alfredo Bonner RN  Outcome: Met This Shift     Problem: Pain:  Goal: Control of acute pain  Description: Control of acute pain  Outcome: Met This Shift     Problem: Pain:  Goal: Control of chronic pain  Description: Control of chronic pain  Outcome: Met This Shift     Problem: Falls - Risk of:  Goal: Will remain free from falls  Description: Will remain free from falls  Outcome: Met This Shift     Problem: Falls - Risk of:  Goal: Absence of physical injury  Description: Absence of physical injury  Outcome: Met This Shift     Problem: Serum Glucose Level - Abnormal:  Goal: Ability to maintain appropriate glucose levels will improve  Description: Ability to maintain appropriate glucose levels will improve  Outcome: Not Met This Shift  Note: Blood glucose 495- notified Dr. Alfaro Horse sliding scale ordered along with increasing lantus order. Administered insulin as ordered and gave with a diabetic snack.  Will continue to assess pt     Problem: Sensory Perception - Impaired:  Goal: Ability to maintain a stable neurologic state will improve  Description: Ability to maintain a stable neurologic state will improve  Outcome: Met This Shift

## 2020-11-21 NOTE — CARE COORDINATION
Follow up Note (admitted after midnight)    Patient admitted earlier this morning. Insulin drip was never initiated as she had lost IV access and blood sugars came down to 215 with bolus of insulin. PICC line placed in the emergency room by the IV nurse per my request.    Patient had been in ER hold for ICU this morning but since she did not need to go on insulin drip she was changed over to [facility] and sent to the for floor. I evaluated patient this evening and she was complaining of dry mouth and being hungry. She had been made n.p.o. because of the concern for DKA but we will go ahead and resume renal diabetic diet. Nursing staff did discuss with patient's mom and was relayed the following concern that they wanted her to resume Remeron which helps keep her appetite up. Patient also missed outpatient appointment for vancomycin infusion on the 18th and that needs to be given today. Case discussed with patient's nurse on the floor.

## 2020-11-22 VITALS
TEMPERATURE: 98.7 F | HEART RATE: 87 BPM | BODY MASS INDEX: 25.47 KG/M2 | WEIGHT: 149.2 LBS | DIASTOLIC BLOOD PRESSURE: 84 MMHG | OXYGEN SATURATION: 99 % | SYSTOLIC BLOOD PRESSURE: 176 MMHG | HEIGHT: 64 IN | RESPIRATION RATE: 20 BRPM

## 2020-11-22 LAB
ANION GAP SERPL CALCULATED.3IONS-SCNC: 16 MMOL/L (ref 7–16)
BASOPHILS ABSOLUTE: 0.09 E9/L (ref 0–0.2)
BASOPHILS RELATIVE PERCENT: 1.2 % (ref 0–2)
BUN BLDV-MCNC: 54 MG/DL (ref 6–20)
CALCIUM SERPL-MCNC: 8.1 MG/DL (ref 8.6–10.2)
CHLORIDE BLD-SCNC: 102 MMOL/L (ref 98–107)
CO2: 22 MMOL/L (ref 22–29)
CREAT SERPL-MCNC: 7.5 MG/DL (ref 0.5–1)
EOSINOPHILS ABSOLUTE: 0.5 E9/L (ref 0.05–0.5)
EOSINOPHILS RELATIVE PERCENT: 6.9 % (ref 0–6)
GFR AFRICAN AMERICAN: 8
GFR NON-AFRICAN AMERICAN: 8 ML/MIN/1.73
GLUCOSE BLD-MCNC: 108 MG/DL (ref 74–99)
HCT VFR BLD CALC: 26.5 % (ref 34–48)
HEMOGLOBIN: 9 G/DL (ref 11.5–15.5)
IMMATURE GRANULOCYTES #: 0.02 E9/L
IMMATURE GRANULOCYTES %: 0.3 % (ref 0–5)
LYMPHOCYTES ABSOLUTE: 1.71 E9/L (ref 1.5–4)
LYMPHOCYTES RELATIVE PERCENT: 23.5 % (ref 20–42)
MAGNESIUM: 2.2 MG/DL (ref 1.6–2.6)
MCH RBC QN AUTO: 29.9 PG (ref 26–35)
MCHC RBC AUTO-ENTMCNC: 34 % (ref 32–34.5)
MCV RBC AUTO: 88 FL (ref 80–99.9)
METER GLUCOSE: 122 MG/DL (ref 74–99)
METER GLUCOSE: 197 MG/DL (ref 74–99)
MONOCYTES ABSOLUTE: 0.37 E9/L (ref 0.1–0.95)
MONOCYTES RELATIVE PERCENT: 5.1 % (ref 2–12)
NEUTROPHILS ABSOLUTE: 4.6 E9/L (ref 1.8–7.3)
NEUTROPHILS RELATIVE PERCENT: 63 % (ref 43–80)
PDW BLD-RTO: 14.6 FL (ref 11.5–15)
PLATELET # BLD: 132 E9/L (ref 130–450)
PMV BLD AUTO: 10.8 FL (ref 7–12)
POTASSIUM SERPL-SCNC: 2.9 MMOL/L (ref 3.5–5)
RBC # BLD: 3.01 E12/L (ref 3.5–5.5)
SODIUM BLD-SCNC: 140 MMOL/L (ref 132–146)
WBC # BLD: 7.3 E9/L (ref 4.5–11.5)

## 2020-11-22 PROCEDURE — 36415 COLL VENOUS BLD VENIPUNCTURE: CPT

## 2020-11-22 PROCEDURE — 6360000002 HC RX W HCPCS: Performed by: INTERNAL MEDICINE

## 2020-11-22 PROCEDURE — 80048 BASIC METABOLIC PNL TOTAL CA: CPT

## 2020-11-22 PROCEDURE — 6370000000 HC RX 637 (ALT 250 FOR IP): Performed by: INTERNAL MEDICINE

## 2020-11-22 PROCEDURE — 6370000000 HC RX 637 (ALT 250 FOR IP): Performed by: NURSE PRACTITIONER

## 2020-11-22 PROCEDURE — 99239 HOSP IP/OBS DSCHRG MGMT >30: CPT | Performed by: INTERNAL MEDICINE

## 2020-11-22 PROCEDURE — 2580000003 HC RX 258: Performed by: INTERNAL MEDICINE

## 2020-11-22 PROCEDURE — 82962 GLUCOSE BLOOD TEST: CPT

## 2020-11-22 PROCEDURE — 83735 ASSAY OF MAGNESIUM: CPT

## 2020-11-22 PROCEDURE — 85025 COMPLETE CBC W/AUTO DIFF WBC: CPT

## 2020-11-22 RX ORDER — LOPERAMIDE HYDROCHLORIDE 2 MG/1
4 CAPSULE ORAL ONCE
Status: COMPLETED | OUTPATIENT
Start: 2020-11-22 | End: 2020-11-22

## 2020-11-22 RX ORDER — INSULIN GLARGINE 100 [IU]/ML
6 INJECTION, SOLUTION SUBCUTANEOUS NIGHTLY
Qty: 1 VIAL | Refills: 3
Start: 2020-11-22 | End: 2021-02-15 | Stop reason: ALTCHOICE

## 2020-11-22 RX ORDER — POTASSIUM CHLORIDE 29.8 MG/ML
20 INJECTION INTRAVENOUS
Status: COMPLETED | OUTPATIENT
Start: 2020-11-22 | End: 2020-11-22

## 2020-11-22 RX ADMIN — POTASSIUM CHLORIDE 20 MEQ: 29.8 INJECTION, SOLUTION INTRAVENOUS at 13:10

## 2020-11-22 RX ADMIN — METOLAZONE 5 MG: 2.5 TABLET ORAL at 09:18

## 2020-11-22 RX ADMIN — INSULIN LISPRO 1 UNITS: 100 INJECTION, SOLUTION INTRAVENOUS; SUBCUTANEOUS at 00:04

## 2020-11-22 RX ADMIN — SODIUM CHLORIDE, PRESERVATIVE FREE 300 UNITS: 5 INJECTION INTRAVENOUS at 09:19

## 2020-11-22 RX ADMIN — BUMETANIDE 2 MG: 1 TABLET ORAL at 09:18

## 2020-11-22 RX ADMIN — LISINOPRIL 20 MG: 10 TABLET ORAL at 09:17

## 2020-11-22 RX ADMIN — INSULIN GLARGINE 6 UNITS: 100 INJECTION, SOLUTION SUBCUTANEOUS at 00:02

## 2020-11-22 RX ADMIN — POTASSIUM CHLORIDE 20 MEQ: 29.8 INJECTION, SOLUTION INTRAVENOUS at 14:56

## 2020-11-22 RX ADMIN — METOPROLOL TARTRATE 100 MG: 25 TABLET, FILM COATED ORAL at 09:18

## 2020-11-22 RX ADMIN — INSULIN LISPRO 2 UNITS: 100 INJECTION, SOLUTION INTRAVENOUS; SUBCUTANEOUS at 13:11

## 2020-11-22 RX ADMIN — INSULIN LISPRO 1 UNITS: 100 INJECTION, SOLUTION INTRAVENOUS; SUBCUTANEOUS at 13:11

## 2020-11-22 RX ADMIN — LOPERAMIDE HYDROCHLORIDE 4 MG: 2 CAPSULE ORAL at 13:12

## 2020-11-22 RX ADMIN — MULTIPLE VITAMINS W/ MINERALS TAB 1 TABLET: TAB at 09:18

## 2020-11-22 RX ADMIN — LEVOTHYROXINE SODIUM 75 MCG: 0.07 TABLET ORAL at 06:46

## 2020-11-22 RX ADMIN — Medication 10 ML: at 09:18

## 2020-11-22 RX ADMIN — DILTIAZEM HYDROCHLORIDE 240 MG: 240 CAPSULE, COATED, EXTENDED RELEASE ORAL at 09:17

## 2020-11-22 RX ADMIN — Medication 10 ML: at 00:01

## 2020-11-22 ASSESSMENT — PAIN SCALES - GENERAL: PAINLEVEL_OUTOF10: 0

## 2020-11-22 NOTE — PROGRESS NOTES
Department of Internal Medicine  Nephrology Progress  Note    Subjective:   alert but confused.  NAD    Current Medications:    Current Facility-Administered Medications: OXcarbazepine (TRILEPTAL) tablet 300 mg, 300 mg, Oral, BID  insulin lispro (HUMALOG) injection vial 2 Units, 2 Units, Subcutaneous, TID WC  insulin glargine (LANTUS) injection vial 6 Units, 6 Units, Subcutaneous, Nightly  insulin lispro (HUMALOG) injection vial 0-4 Units, 0-4 Units, Subcutaneous, 4x Daily AC & HS  atorvastatin (LIPITOR) tablet 40 mg, 40 mg, Oral, Nightly  dilTIAZem (CARDIZEM CD) extended release capsule 240 mg, 240 mg, Oral, Daily  levothyroxine (SYNTHROID) tablet 75 mcg, 75 mcg, Oral, QAM AC  therapeutic multivitamin-minerals 1 tablet, 1 tablet, Oral, Daily  sodium chloride flush 0.9 % injection 10 mL, 10 mL, Intravenous, 2 times per day  sodium chloride flush 0.9 % injection 10 mL, 10 mL, Intravenous, PRN  acetaminophen (TYLENOL) tablet 650 mg, 650 mg, Oral, Q6H PRN **OR** acetaminophen (TYLENOL) suppository 650 mg, 650 mg, Rectal, Q6H PRN  polyethylene glycol (GLYCOLAX) packet 17 g, 17 g, Oral, Daily PRN  lidocaine PF 1 % injection 5 mL, 5 mL, Intradermal, Once  sodium chloride flush 0.9 % injection 10 mL, 10 mL, Intravenous, PRN  heparin flush 100 UNIT/ML injection 300 Units, 3 mL, Intravenous, 2 times per day  heparin flush 100 UNIT/ML injection 300 Units, 3 mL, Intracatheter, PRN  metOLazone (ZAROXOLYN) tablet 5 mg, 5 mg, Oral, Daily  [Held by provider] epoetin nena-epbx (RETACRIT) injection 4,000 Units, 4,000 Units, Subcutaneous, Once per day on Mon Wed Fri  bumetanide (BUMEX) tablet 2 mg, 2 mg, Oral, BID  lisinopril (PRINIVIL;ZESTRIL) tablet 20 mg, 20 mg, Oral, Daily  metoprolol tartrate (LOPRESSOR) tablet 100 mg, 100 mg, Oral, BID  glucose (GLUTOSE) 40 % oral gel 15 g, 15 g, Oral, PRN  dextrose 50 % IV solution, 12.5 g, Intravenous, PRN  glucagon (rDNA) injection 1 mg, 1 mg, Intramuscular, PRN  dextrose 5 % solution, 100 mL/hr, Intravenous, PRN  Allergies:  Cefepime and Toradol [ketorolac tromethamine]    Social History:    TOBACCO:   reports that she has been smoking cigarettes. She has a 3.00 pack-year smoking history. She uses smokeless tobacco.  ETOH:   reports no history of alcohol use. DRUGS:   reports no history of drug use.     Family History:       Problem Relation Age of Onset   [facility] Asthma Mother     Hypertension Mother     High Blood Pressure Mother     Diabetes Mother     Asthma Brother     High Blood Pressure Father      REVIEW OF SYSTEMS:    Pertinent positives and negatives are stated within HPI, all other systems reviewed and are negative    PHYSICAL EXAM:      Vitals:    VITALS:  BP (!) 176/84   Pulse 87   Temp 98.7 °F (37.1 °C) (Oral)   Resp 20   Ht 5' 4\" (1.626 m)   Wt 149 lb 3.2 oz (67.7 kg)   SpO2 99%   BMI 25.61 kg/m²   24HR INTAKE/OUTPUT:      Intake/Output Summary (Last 24 hours) at 11/22/2020 1516  Last data filed at 11/22/2020 0919  Gross per 24 hour   Intake 133 ml   Output 515 ml   Net -382 ml       PD Catheter Exam:  PD catheter exit site clean    Constitutional:  disoriented  HEENT:  Normocephalic, PERRL  Respiratory:  CTA bilaterally  Cardiovascular/Edema:  RRR, S1/S2  Gastrointestinal:  Soft, PD catheter exit site clean   Neurologic:  Nonfocal, AVELAR  Skin:  Warm, dry, no lesions  Other:  No edema    DATA:    CBC with Differential:    Lab Results   Component Value Date    WBC 7.3 11/22/2020    RBC 3.01 11/22/2020    HGB 9.0 11/22/2020    HCT 26.5 11/22/2020     11/22/2020    MCV 88.0 11/22/2020    MCH 29.9 11/22/2020    MCHC 34.0 11/22/2020    RDW 14.6 11/22/2020    NRBC 0.9 08/10/2020    SEGSPCT 67 06/27/2013    METASPCT 1.7 08/10/2020    LYMPHOPCT 23.5 11/22/2020    MONOPCT 5.1 11/22/2020    BASOPCT 1.2 11/22/2020    MONOSABS 0.37 11/22/2020    LYMPHSABS 1.71 11/22/2020    EOSABS 0.50 11/22/2020    BASOSABS 0.09 11/22/2020     CMP:    Lab Results   Component Value Date     11/22/2020    K 2.9 11/22/2020    K 3.5 11/20/2020     11/22/2020    CO2 22 11/22/2020    BUN 54 11/22/2020    CREATININE 7.5 11/22/2020    GFRAA 8 11/22/2020    LABGLOM 8 11/22/2020    GLUCOSE 108 11/22/2020    GLUCOSE 130 05/18/2012    PROT 5.6 11/20/2020    LABALBU 3.2 11/20/2020    LABALBU 4.1 05/18/2012    CALCIUM 8.1 11/22/2020    BILITOT 0.2 11/20/2020    ALKPHOS 143 11/20/2020    AST 18 11/20/2020    ALT 5 11/20/2020     Magnesium:    Lab Results   Component Value Date    MG 2.2 11/22/2020     Phosphorus:    Lab Results   Component Value Date    PHOS 8.0 11/20/2020     Radiology Review:      CT head 11/20/20    No acute intracranial abnormality. MRI Brain 11/20/20   Interval resolution of relatively symmetric linear restricted diffusion    within the bilateral frontal parietal deep white matter with interval    development patchy FLAIR hyperintensities within the periventricular, deep,    and subcortical white matter, left greater than right.  Findings are    nonspecific with primary diagnostic consideration given to infectious versus    metabolic encephalopathy.  Correlation with CSF analysis may be of benefit. IMPRESSION/RECOMMENDATIONS:    Belkys Gregory is a 28 year-old female with history of ESRD secondary to hypertensive nephrosclerosis diabetic nephropathy, on Peritoneal Dialysis 10 liters/day with 4 manual exchanges of 2 liters, 1.5% every 9 hours/Cycle, last fill 2L of 2.5%, with severe proteinuria, poorly controlled type I DM with multiple admissions for DKA, gastroparesis, HTN who presented to the ER this morning via EMS with altered mental status secondary to diabetic ketoacidosis. Per family she had completed peritoneal dialysis and was then found on the floor. EMS reported her glucose was critically high. 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

## 2020-11-22 NOTE — DISCHARGE SUMMARY
[facility] Physician Discharge Summary       Italo Mcgarry, [address] [place] [address]  [phone]    Schedule an appointment as soon as possible for a visit in 1 week  Hospital follow up    Lizbeth Lemus MD  [address] [facility]  [address] [facility],[address] [address] [phone]    Go on 11/23/2020  Previously scheduled follow up appointment with endocrinologist.      Activity level: As tolerated     Diet: DIET RENAL; Carb Control: 4 carb choices (60 gms)/meal    Labs:Stool for C diff pending. Condition at discharge: Stable     Dispo:Home        Patient ID:  Vanessa Rodriguez  [id number]  86 y.o.  1990    Admit date: 11/20/2020    Discharge date and time:  11/22/2020  12:19 PM    Admission Diagnoses: Principal Problem:    Seizure [facility])  Active Problems:    Poorly controlled type 1 diabetes mellitus ([facility]    ESRD on peritoneal dialysis ([address]    Opioid abuse, episodic ([address]    Acute metabolic encephalopathy    Endocarditis    Hyperosmolar hyperglycemic state (HHS) ([address]  Resolved Problems:    * No resolved hospital problems. *      Discharge Diagnoses: Principal Problem:    Seizure ([address]  Active Problems:    Poorly controlled type 1 diabetes mellitus ([address]    ESRD on peritoneal dialysis ([address]    Opioid abuse, episodic ([address]    Acute metabolic encephalopathy    Endocarditis    Hyperosmolar hyperglycemic state (HHS) ([address]  Resolved Problems:    * No resolved hospital problems. *      Consults:  IP CONSULT TO NEPHROLOGY  IP CONSULT TO NEPHROLOGY  IP CONSULT TO NEUROLOGY    Procedures: PICC line placed  11/20 and removed on discharged 11/22    History of Present Illness: 28-year-old female with a history of ESRD on peritoneal dialysis, hypertension, hypothyroidism, prior substance abuse, diabetes mellitus type 1. 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   Discharged on vancomycin 1 g every week x 2. She uses peritoneal cycler at night, she isnt sure if she connected herself yesterday. She isnt able to state how she came to the ED, apparently family members found her on the floor unresponsive after she had ?disconnected herself from the cycler. Initially in the ED she was reportedly drowsy but this improved after 1L of IVF and 12 units regular insulin per ed team. When seen she was oriented, answers questions but isnt able to provide any significant history as far as her presentation. She did recall an episode where she became tense, states her eyes rolled to the back of her head and then it resolved. States she recalls that episode. During the encounter as she rolled onto her back her eyes rolled up, she became unresponsive, extremities became parents, had a brief cry. Followed by some convulsions. Was not responsive afterwards. She was seen again after 15 minutes and was answering some questions but was more confused than prior. Hospital Course: 31 female admitted as per above details. See outline below for additional details and issues addressed this admission:     1. Type I DM with hyperglycemia--she never required an insulin drip. Resumed home regimen but I adjusted values today based on age is made by endocrinologist her last admission. She has a follow-up appointment 11/23 and patient was motivated to be discharged to make that appointment tomorrow. .  2. Recent staph epidermidis endocarditis--was supposed to complete outpatient antibiotics of vancomycin 1 g weekly x2. First one was given at discharge 11/11 but she missed dose on 11/18. She received dose here on the 20th and PICC line was pulled at discharge. 3. Possible seizure--patient was seen by neurology and patient was agreeable at that point to anticonvulsant therapy.   Theyhad plan on starting her on oxcarbazepine and getting an EEG but patient declined to start medication this admission and therefore --  99 102   CO2 24  --  23 22   BUN 47*  --  55* 54*   CREATININE 7.3*  --  8.0* 7.5*   GLUCOSE 1,040* 269 30* 108*   CALCIUM 8.0*  --  7.9* 8.1*       Recent Labs     11/20/20  0045 11/21/20  0445 11/22/20  0630   WBC 7.2 9.3 7.3   RBC 3.43* 3.23* 3.01*   HGB 10.3* 9.6* 9.0*   HCT 32.3* 28.6* 26.5*   MCV 94.2 88.5 88.0   MCH 30.0 29.7 29.9   MCHC 31.9* 33.6 34.0   RDW 16.1* 14.6 14.6    157 132   MPV 11.3 10.0 10.8          Culture, Blood 1 [[id number]]  Collected: 11/20/20 1707       Specimen: Blood  Updated: 11/22/20 1135        Blood Culture, Routine  24 Hours no growth       Narrative:         Source: BLOOD       Site: Hand, Left                 Culture, Blood 2 [[id number]]  Collected: 11/20/20 1900       Specimen: Blood  Updated: 11/22/20 1035        Culture, Blood 2  24 Hours no growth       Narrative:         Source: BLOOD       Site:            Imaging:      Ct Head Wo Contrast    Result Date: 11/20/2020  EXAMINATION: CT OF THE HEAD WITHOUT CONTRAST  11/20/2020 8:07 am TECHNIQUE: CT of the head was performed without the administration of intravenous contrast. Dose modulation, iterative reconstruction, and/or weight based adjustment of the mA/kV was utilized to reduce the radiation dose to as low as reasonably achievable. COMPARISON: 10/20/2020 HISTORY: ORDERING SYSTEM PROVIDED HISTORY: seizure TECHNOLOGIST PROVIDED HISTORY: Reason for exam:->seizure Has a \"code stroke\" or \"stroke alert\" been called? ->No FINDINGS: BRAIN/VENTRICLES: There is no acute intracranial hemorrhage, mass effect or midline shift. No abnormal extra-axial fluid collection. The gray-white differentiation is maintained without evidence of an acute infarct. There is no evidence of hydrocephalus. ORBITS: The visualized portion of the orbits demonstrate no acute abnormality. SINUSES: The visualized paranasal sinuses and mastoid air cells demonstrate no acute abnormality.  SOFT TISSUES/SKULL:  No acute abnormality of the visualized skull or soft tissues. No acute intracranial abnormality. Xr Chest Portable    Result Date: 11/20/2020  EXAMINATION: ONE XRAY VIEW OF THE CHEST 11/20/2020 2:49 pm COMPARISON: November 6, 2020 HISTORY: ORDERING SYSTEM PROVIDED HISTORY: PICC line placement TECHNOLOGIST PROVIDED HISTORY: Reason for exam:->PICC line placement FINDINGS: Right-sided PICC line catheter has been inserted with the tip at the cavoatrial junction. No pneumothorax. The lungs are clear. The cardiac silhouette is within normal limits. The costophrenic angles are clear. No pneumothorax, post right-sided PICC line catheter placement. Mri Brain Wo Contrast    Result Date: 11/20/2020  EXAMINATION: MRI OF THE BRAIN WITHOUT CONTRAST  11/20/2020 7:45 am TECHNIQUE: Multiplanar multisequence MRI of the brain was performed without the administration of intravenous contrast. COMPARISON: MR brain October 23, 2020. HISTORY: ORDERING SYSTEM PROVIDED HISTORY: recent endocarditis. has seizure TECHNOLOGIST PROVIDED HISTORY: Reason for exam:->recent endocarditis. has seizure FINDINGS: The ventricular, sulcal, and cisternal spaces are age-appropriate. The pituitary gland is normal in size. The cerebellar tonsils are normal in position. The vascular arterial flow voids at the skull base appears patent. There is been interval resolution of relatively symmetric linear restricted diffusion within the bilateral frontal parietal deep white matter. There is been interval development of patchy FLAIR hyperintensities within the periventricular, deep, and subcortical white matter, left greater than right. There is no restricted diffusion to suggest an acute infarct. There is no evidence for discrete mass lesion, extra-axial fluid collection, or midline shift. The orbital globes and retrobulbar structures appear grossly unremarkable. There is mild diffuse mucosal thickening of the bilateral maxillary, right sphenoid, and bilateral ethmoid sinuses.   The mastoid air cells are well aerated. Interval resolution of relatively symmetric linear restricted diffusion within the bilateral frontal parietal deep white matter with interval development patchy FLAIR hyperintensities within the periventricular, deep, and subcortical white matter, left greater than right. Findings are nonspecific with primary diagnostic consideration given to infectious versus metabolic encephalopathy. Correlation with CSF analysis may be of benefit. Patient Instructions:   Current Discharge Medication List      CONTINUE these medications which have CHANGED    Details   !! insulin lispro (HUMALOG) 100 UNIT/ML injection vial Inject 2 Units into the skin 3 times daily (with meals)  Qty: 1 vial, Refills: 3      !! insulin lispro (HUMALOG) 100 UNIT/ML injection vial Inject 0-4 Units into the skin 4 times daily (before meals and nightly) Glucose: Dose:              No Insulin 181-280 1 Unit 281-380 2 Units 381-480 3 Units Over 480 4 Units  Qty: 1 vial, Refills: 0      insulin glargine (LANTUS) 100 UNIT/ML injection vial Inject 6 Units into the skin nightly  Qty: 1 vial, Refills: 3       !! - Potential duplicate medications found. Please discuss with provider.       CONTINUE these medications which have NOT CHANGED    Details   bumetanide (BUMEX) 1 MG tablet Take 2 mg by mouth daily      metoclopramide (REGLAN) 5 MG tablet Take 5 mg by mouth 4 times daily (before meals and nightly)      metoprolol (LOPRESSOR) 100 MG tablet Take 100 mg by mouth 2 times daily      potassium chloride (KLOR-CON M) 20 MEQ extended release tablet Take 20 mEq by mouth daily      levothyroxine (SYNTHROID) 75 MCG tablet Take 1 tablet by mouth every morning (before breakfast)  Qty: 30 tablet, Refills: 3      metOLazone (ZAROXOLYN) 5 MG tablet Take 5 mg by mouth daily      cloNIDine (CATAPRES) 0.1 MG tablet Take 0.1 mg by mouth 2 times daily as needed for High Blood Pressure      atorvastatin (LIPITOR) 40 MG tablet Take 1 tablet by mouth nightly  Qty: 30 tablet, Refills: 5    Associated Diagnoses: Hyperlipidemia, unspecified hyperlipidemia type      dilTIAZem (CARDIZEM CD) 240 MG extended release capsule Take 1 capsule by mouth daily  Qty: 30 capsule, Refills: 2    Associated Diagnoses: Diabetic autonomic neuropathy associated with type 1 diabetes mellitus (HCC)      vancomycin (VANCOCIN) infusion Infuse 1,000 mg intravenously every 7 days for 14 days For 2 weeks ( #2)  Qty: 2000 mg, Refills: 0         STOP taking these medications       Heparin Sod, Porcine, in D5W (HEPARIN, PORCINE,) 100 UNIT/ML SOLN infusion Comments:   Reason for Stopping:         lactobacillus (CULTURELLE) CAPS capsule Comments:   Reason for Stopping:         Insulin Syringes, Disposable, U-100 0.3 ML MISC Comments:   Reason for Stopping:         vitamin D (ERGOCALCIFEROL) 1.25 MG (67094 UT) CAPS capsule Comments:   Reason for Stopping:         Multiple Vitamins-Minerals (RENAPLEX-D) TABS Comments:   Reason for Stopping:         blood glucose test strips (ASCENSIA AUTODISC VI;ONE TOUCH ULTRA TEST VI) strip Comments:   Reason for Stopping:         Lancets Thin MISC Comments:   Reason for Stopping:         epoetin nena-epbx (RETACRIT) 4000 UNIT/ML SOLN injection Comments:   Reason for Stopping:         LORazepam (ATIVAN) 0.5 MG tablet Comments:   Reason for Stopping:         glucose (GLUTOSE) 40 % GEL Comments:   Reason for Stopping:         Insulin Syringe-Needle U-100 (INSULIN SYRINGE .5CC/30GX1/2\") 30G X 1/2\" 0.5 ML MISC Comments:   Reason for Stopping:                 Note that more than 30 minutes was spent in preparing discharge papers, discussing discharge with patient, medication review, etc.    NOTE: This report was transcribed using voice recognition software.  Every effort was made to ensure accuracy; however, inadvertent computerized transcription errors may be present.     Signed:  Electronically signed by Kathleen Garcia MD on

## 2020-11-22 NOTE — PROGRESS NOTES
S: Concerned about seeing her endocrinologist in [place] [address] for insulin pump adjustment with appointment tomorrow. She is tolerating oxcarbazepine and no further spells reported. Objective:      Neuro exam 176/86, pulse 84, she is afebrile  General: awake and alert. Cranial nerve testing was normal.  Funduscopic eye exam revealed not testable. Motor exam: 5-/5. Deep tendon reflexes were absent bilaterally. Plantar responses were flexor bilaterally. Cerebellar exam noted finger to nose without dysmetria. Sensation was normal to joint position sense, light touch and a pin prick . .        Assessment:   Seizure with significant hyperosmolar/metabolic encephalopathy in a patient with recently diagnosed endocarditis and borderline abnormal MRI of brain with negative head CT and nonfocal exam.        Plan:   Discussed starting anticonvulsant therapy and she is receptive to doing this. We will start oxcarbazepine which is not renally excreted. We will check EEG and she is advised against driving. Happy to see her in the office in follow-up at discretion of primary care.   Thank you for consultation

## 2020-11-22 NOTE — FLOWSHEET NOTE
11/21/20 2018   Peritoneal Dialysis Catheter Left lower abdomen   Placement Date/Time: 10/31/20 0704   Catheter Location: Left lower abdomen   Status Accessed   Site Condition No Complications   Dressing Status Dry;Clean; Intact   Dressing   (CHG infused )       tx initiated draining no complications  Effluent clear colorless insertion site clean  Patient states no difficulties with treatment

## 2020-11-22 NOTE — CARE COORDINATION
SOCIAL WORK / DISCHARGE PLANNING:  Sw consulted by  for possible dc home with picc line and need maintained until 11/25 appt for weekly vanco. Sw did speak with [facility] rep, Sofi Horan, unable to check benefits today, could do it Monday but do anticipate coverage but they would be unable to supply nursing for even one visit. No [facility] agencies available to accept pt's insurance for one visit despite it is reported pt's mom is nurse would need to demonstrate ability to flush picc line. Dc on 11/11 from [facility] to go to [facility] x 1 on 11/18 but missed dose. Dc summary indicates 2 weeks of IV vanco q weekly bu t with midline pulled prior to dc. Pt to dc today, if has appt 11/25 will need to have peripheral placed for that infusion. No other dc needs identified at this time.                Electronically signed by HANNAH Paulino on 11/22/2020 at 1:53 PM

## 2020-11-23 ENCOUNTER — OFFICE VISIT (OUTPATIENT)
Dept: ENDOCRINOLOGY | Age: 30
End: 2020-11-23
Payer: COMMERCIAL

## 2020-11-23 VITALS
TEMPERATURE: 98.3 F | DIASTOLIC BLOOD PRESSURE: 102 MMHG | BODY MASS INDEX: 25.88 KG/M2 | SYSTOLIC BLOOD PRESSURE: 152 MMHG | WEIGHT: 150.8 LBS | OXYGEN SATURATION: 98 % | HEART RATE: 94 BPM

## 2020-11-23 PROCEDURE — 99214 OFFICE O/P EST MOD 30 MIN: CPT | Performed by: INTERNAL MEDICINE

## 2020-11-23 PROCEDURE — 4004F PT TOBACCO SCREEN RCVD TLK: CPT | Performed by: INTERNAL MEDICINE

## 2020-11-23 PROCEDURE — 1111F DSCHRG MED/CURRENT MED MERGE: CPT | Performed by: INTERNAL MEDICINE

## 2020-11-23 PROCEDURE — G8427 DOCREV CUR MEDS BY ELIG CLIN: HCPCS | Performed by: INTERNAL MEDICINE

## 2020-11-23 PROCEDURE — G8484 FLU IMMUNIZE NO ADMIN: HCPCS | Performed by: INTERNAL MEDICINE

## 2020-11-23 PROCEDURE — 2022F DILAT RTA XM EVC RTNOPTHY: CPT | Performed by: INTERNAL MEDICINE

## 2020-11-23 PROCEDURE — G8419 CALC BMI OUT NRM PARAM NOF/U: HCPCS | Performed by: INTERNAL MEDICINE

## 2020-11-23 PROCEDURE — 3044F HG A1C LEVEL LT 7.0%: CPT | Performed by: INTERNAL MEDICINE

## 2020-11-23 NOTE — PROGRESS NOTES
[address] [facility] Department of Endocrinology Diabetes and Metabolism   [address] [facility] [address]   Phone: [phone]  Fax: [phone]    Date of Service: 11/23/2020    Primary Care Physician: Italo Mcgarry MD  Referring physician: No ref. provider found  Provider: Kevin Mcguire MD     Reason for the visit:  Poorly controlled DM type 1     History of Present Illness: The history is provided by the patient. No  was used. Accuracy of the patient data is excellent.   Vanessa Rodriguez is a very pleasant 31 y.o. female seen today for diabetes management     Vanessa Rodriguez was diagnosed with diabetes at age 17 and currently on Lantus 10 units am and 6 units at night , Novolog 2 units with meals + ss 1:50:150   The patient has been checking blood sugar 4-6 times a day and readings still highly variable   To receive insulin pump in few days and start training   Her DM complicated with ESRD and currently on Peritoneal dialysis   Most recent A1c results summarized below  Lab Results   Component Value Date    LABA1C 6.2 11/09/2020    LABA1C 8.6 07/19/2020    LABA1C 8.8 01/23/2020     Patient reported frequent hypoglycemic episodes both fasting and overnight   The patient hasn't been mindful of what has been eating and wasn't following diabetes diet as encouraged   I reviewed current medications and the patient has no issues with diabetes medications  Vanessa Rodriguez is up to date with eye exam. + diabetic retinopathy   The patient performs her own feet care  Microvascular complications:  + Retinopathy, + ESRD on peritoneal dialysis, + Neuropathy   The patient receives Flushot every year and up to date with the Pneumonia vaccine     PAST MEDICAL HISTORY   Past Medical History:   Diagnosis Date    Acute congestive heart failure ([address]     BC (acute kidney injury) ([address] 10/1/2019    Cephalgia 10/9/2019    Chronic kidney disease     Depression     [facility]     Diabetic ketoacidosis ([facility] 2011    Hemodialysis patient ([facility]     History of blood transfusion 2019    Hyperlipidemia 10/8/2020    Hypothyroidism 10/8/2020    Iron deficiency anemia 10/1/2019    MDRO (multiple drug resistant organisms) resistance     MRSA (methicillin resistant Staphylococcus aureus)     back wound abcess    Non compliance w medication regimen 3/30/2016    Other disorders of kidney and ureter     Pregnancy 3/30/2016    16 weeks    Seizure ([facility] 2020    Severe pre-eclampsia in third trimester 2016       PAST SURGICAL HISTORY   Past Surgical History:   Procedure Laterality Date    BACK SURGERY      abscess     SECTION      x2    CHOLECYSTECTOMY, LAPAROSCOPIC N/A 2019    CHOLECYSTECTOMY LAPAROSCOPIC performed by Delfina Burnette MD at [address] [facility] N/A 2018    COLONOSCOPY WITH BIOPSY performed by Gus Marcial MD at [address] [facility] COLONOSCOPY N/A 2018    COLONOSCOPY WITH BIOPSY performed by Irwin Ramachandran MD at [address] [facility] ECHO COMPL [address]  2012    EF 57%    ECHO COMPL W DOP COLOR FLOW  6/10/2013         EMBOLECTOMY N/A 2020    [address], [facility], [place] -- REQS ROOM 3 performed by Jerardo Ly MD at [address] [facility] N/A 2020    LAPAROSCOPIC INSERTION PERITONEAL DIALYSIS CATHETER performed by Isaiah Hernandez MD at [facility] ESOPHAGOGASTRODUODENOSCOPY TRANSORAL DIAGNOSTIC N/A 2018    EGD ESOPHAGOGASTRODUODENOSCOPY performed by Gus Marcial MD at [address] [facility] TRANSESOPHAGEAL ECHOCARDIOGRAM N/A 10/19/2020    TRANSESOPHAGEAL ECHOCARDIOGRAM WITH BUBBLE STUDY performed by Patrick Mayfield MD at [address] [facility] [address] [address]  2018    UPPER GASTROINTESTINAL ENDOSCOPY  2018    EGD BIOPSY performed by Irwin Ramachandran MD at [address] [facility] [address] 10/11/2019    EGD ESOPHAGOGASTRODUODENOSCOPY performed by Brie Angelo DO at [facility]:   reports that she has been smoking cigarettes. She has a 3.00 pack-year smoking history. She uses smokeless tobacco.  Alcohol:   reports no history of alcohol use. Drugs:   reports no history of drug use.     FAMILY HISTORY   Family History   Problem Relation Age of Onset    Asthma Mother     Hypertension Mother     High Blood Pressure Mother     Diabetes Mother     Asthma Brother     High Blood Pressure Father        ALLERGIES AND DRUG REACTIONS   Allergies   Allergen Reactions    Cefepime Anaphylaxis     \" neurological side effect- slurred speech and confusion    Toradol [Ketorolac Tromethamine] Anaphylaxis and Hives       CURRENT MEDICATIONS   Current Outpatient Medications   Medication Sig Dispense Refill    insulin lispro (HUMALOG) 100 UNIT/ML injection vial Inject 2 Units into the skin 3 times daily (with meals) 1 vial 3    insulin lispro (HUMALOG) 100 UNIT/ML injection vial Inject 0-4 Units into the skin 4 times daily (before meals and nightly) Glucose: Dose:              No Insulin 181-280 1 Unit 281-380 2 Units 381-480 3 Units Over 480 4 Units 1 vial 0    insulin glargine (LANTUS) 100 UNIT/ML injection vial Inject 6 Units into the skin nightly (Patient taking differently: Inject 6 Units into the skin nightly Taking 10 unit in am and 6 units in pm) 1 vial 3    bumetanide (BUMEX) 1 MG tablet Take 2 mg by mouth daily      metoclopramide (REGLAN) 5 MG tablet Take 5 mg by mouth 4 times daily (before meals and nightly)      metoprolol (LOPRESSOR) 100 MG tablet Take 100 mg by mouth 2 times daily      potassium chloride (KLOR-CON M) 20 MEQ extended release tablet Take 20 mEq by mouth daily      levothyroxine (SYNTHROID) 75 MCG tablet Take 1 tablet by mouth every morning (before breakfast) 30 tablet 3    metOLazone (ZAROXOLYN) 5 MG tablet Take 5 mg by mouth daily      cloNIDine (CATAPRES) 0.1 MG tablet Take 0.1 mg by mouth 2 times daily as needed for High Blood Pressure      atorvastatin (LIPITOR) 40 MG tablet Take 1 tablet by mouth nightly 30 tablet 5    dilTIAZem (CARDIZEM CD) 240 MG extended release capsule Take 1 capsule by mouth daily 30 capsule 2     No current facility-administered medications for this visit. Review of Systems  Constitutional: No fever, no chills, no diaphoresis, no generalized weakness. HEENT: No blurred vision, No sore throat, no ear pain, no hair loss  Neck: denied any neck swelling, difficulty swallowing,   Cardio-pulmonary: No CP, SOB or palpitation, No orthopnea or PND. No cough or wheezing. GI: No N/V/D, no constipation, No abdominal pain, no melena or hematochezia   : Denied any dysuria, hematuria, flank pain, discharge, or incontinence. Skin: denied any rash, ulcer, Hirsute, or hyperpigmentation. MSK: denied any joint deformity, joint pain/swelling, muscle pain, or back pain. Neuro: no numbness, no tingling, no weakness, _    OBJECTIVE    BP (!) 152/102   Pulse 94   Temp 98.3 °F (36.8 °C)   Wt 150 lb 12.8 oz (68.4 kg)   SpO2 98%   BMI 25.88 kg/m²   BP Readings from Last 4 Encounters:   11/23/20 (!) 152/102   11/22/20 (!) 176/84   11/11/20 (!) 154/93   11/02/20 119/68     Wt Readings from Last 6 Encounters:   11/23/20 150 lb 12.8 oz (68.4 kg)   11/22/20 149 lb 3.2 oz (67.7 kg)   11/11/20 139 lb 1.6 oz (63.1 kg)   10/22/20 143 lb 1.3 oz (64.9 kg)   09/03/20 129 lb (58.5 kg)   07/29/20 141 lb 8 oz (64.2 kg)     Physical examination:  General: awake alert, oriented x3, no abnormal position or movements. HEENT: normocephalic non traumatic, no exophthalmos   Neck: supple, no LN enlargement, no thyromegaly, no thyroid tenderness, no JVD. Pulm: Clear equal air entry no added sounds, no wheezing or rhonchi    CVS: S1 + S2, no murmur, no heave  Abd: soft lax, no tenderness, no organomegaly, audible bowel sounds.    Skin: warm, no lesions, no rash. no Ulcers, No acanthosis nigricans   Neuro: CN intact, sensation decreased bilateral , muscle power normal  Psych: normal mood, and affect    Review of Laboratory Data:  I personally reviewed the following lab:  Lab Results   Component Value Date/Time    WBC 7.3 11/22/2020 06:30 AM    RBC 3.01 (L) 11/22/2020 06:30 AM    HGB 9.0 (L) 11/22/2020 06:30 AM    HCT 26.5 (L) 11/22/2020 06:30 AM    MCV 88.0 11/22/2020 06:30 AM    MCH 29.9 11/22/2020 06:30 AM    MCHC 34.0 11/22/2020 06:30 AM    RDW 14.6 11/22/2020 06:30 AM     11/22/2020 06:30 AM    MPV 10.8 11/22/2020 06:30 AM    BANDS 3 06/25/2012 03:15 PM      Lab Results   Component Value Date/Time     11/22/2020 06:30 AM    K 2.9 (L) 11/22/2020 06:30 AM    K 3.5 11/20/2020 02:50 AM    CO2 22 11/22/2020 06:30 AM    BUN 54 (H) 11/22/2020 06:30 AM    CREATININE 7.5 (H) 11/22/2020 06:30 AM    CALCIUM 8.1 (L) 11/22/2020 06:30 AM    LABGLOM 8 11/22/2020 06:30 AM    GFRAA 8 11/22/2020 06:30 AM      Lab Results   Component Value Date/Time    TSH 4.480 (H) 11/20/2020 08:47 PM    T4FREE 0.78 (L) 10/31/2020 10:43 AM    Z6TOQUV 8.6 11/05/2015 02:20 AM    FT3 1.3 (L) 10/31/2020 10:43 AM    L5BXPOG 36.73 (L) 07/20/2020 02:00 PM     Lab Results   Component Value Date    LABA1C 6.2 11/09/2020    GLUCOSE 108 11/22/2020    GLUCOSE 130 05/18/2012    MALBCR 2949.3 01/15/2020    LABMICR 1740.1 01/15/2020    LABCREA 59 01/15/2020    LABCREA 61 01/15/2020     Lab Results   Component Value Date    LABA1C 6.2 11/09/2020    LABA1C 8.6 07/19/2020    LABA1C 8.8 01/23/2020     Lab Results   Component Value Date    TRIG 82 01/14/2020    HDL 33 01/14/2020    LDLCALC 46 01/14/2020    CHOL 95 01/14/2020     Lab Results   Component Value Date    VITD25 12 07/19/2020    VITD25 <5 01/15/2020       Medical Records/Labs/Images review:   I personally reviewed and summarized previous records   All labs and imaging studies were independently reviewed     ASSESSMENT & RECOMMENDATIONS   Wilton MORALES Nate Schumacher, a 31 y.o.-old female seen in for the following issues     Diabetes Mellitus Type 1    · Patient's diabetes is uncontrol complicated with both macro and microvascular complications   · Pt currently on Lantus 10 units am and 6 units at night , Novolog 2 units with meals + ss 1:50:150   · Will receive insulin pump in few days. To start pump training immediately after receiving pump   · I gave free sample of freestyle Isabel today to help with better BG monitoring until she receive pump   · Patient will need routine diabetes maintenance and prevention    Dietary noncompliance   Discussed with patient the importance of eating consistent carbohydrate meals, avoiding high glycemic index food. Also, discussed with patient the risk and negative consequences of dietary noncompliance on blood glucose control, blood pressure and weight    Hypoglycemia    To start insulin pump and CGM very soon    Discussed hypoglycemia management measures with patient     ESRD   On Peritoneal dialysis    Patient is at risk for hypoglycemia while receiving insulin due to ESRD    Return in about 6 weeks (around 1/4/2021) for DM type 1 . The above issues were reviewed with the patient who understood and agreed with the plan. Thank you for allowing us to participate in the care of this patient. Please do not hesitate to contact us with any additional questions. Diagnosis Orders   1. Type 1 diabetes mellitus with other specified complication ([facility]     2. ESRD (end stage renal disease) ([facility]     3. Hypoglycemia       Riki Sue MD  Endocrinologist, [facility] Diabetes Care and Endocrinology   [address] [facility] [address]   Phone: [phone]  Fax: [phone]  ---------------------------  An electronic signature was used to authenticate this note.  Collins Peters MD on 11/23/2020 at 11:11 AM

## 2020-11-24 ENCOUNTER — HOSPITAL ENCOUNTER (OUTPATIENT)
Age: 30
Setting detail: OBSERVATION
Discharge: LEFT BEFORE DISCHARGE | End: 2020-11-25
Attending: EMERGENCY MEDICINE | Admitting: INTERNAL MEDICINE
Payer: COMMERCIAL

## 2020-11-24 ENCOUNTER — APPOINTMENT (OUTPATIENT)
Dept: CT IMAGING | Age: 30
End: 2020-11-24
Payer: COMMERCIAL

## 2020-11-24 ENCOUNTER — TELEPHONE (OUTPATIENT)
Dept: INTERNAL MEDICINE | Age: 30
End: 2020-11-24

## 2020-11-24 ENCOUNTER — APPOINTMENT (OUTPATIENT)
Dept: GENERAL RADIOLOGY | Age: 30
End: 2020-11-24
Payer: COMMERCIAL

## 2020-11-24 PROBLEM — T68.XXXA HYPOTHERMIA: Status: ACTIVE | Noted: 2020-11-24

## 2020-11-24 LAB
ALBUMIN SERPL-MCNC: 3.1 G/DL (ref 3.5–5.2)
ALP BLD-CCNC: 107 U/L (ref 35–104)
ALT SERPL-CCNC: 19 U/L (ref 0–32)
ANION GAP SERPL CALCULATED.3IONS-SCNC: 16 MMOL/L (ref 7–16)
AST SERPL-CCNC: 39 U/L (ref 0–31)
BACTERIA: ABNORMAL /HPF
BASOPHILS ABSOLUTE: 0.09 E9/L (ref 0–0.2)
BASOPHILS RELATIVE PERCENT: 0.9 % (ref 0–2)
BILIRUB SERPL-MCNC: 0.2 MG/DL (ref 0–1.2)
BILIRUBIN URINE: NEGATIVE
BLOOD, URINE: ABNORMAL
BUN BLDV-MCNC: 50 MG/DL (ref 6–20)
CALCIUM SERPL-MCNC: 7.8 MG/DL (ref 8.6–10.2)
CHLORIDE BLD-SCNC: 102 MMOL/L (ref 98–107)
CHP ED QC CHECK: YES
CLARITY: CLEAR
CO2: 22 MMOL/L (ref 22–29)
COLOR: YELLOW
CREAT SERPL-MCNC: 7.5 MG/DL (ref 0.5–1)
EOSINOPHILS ABSOLUTE: 0.46 E9/L (ref 0.05–0.5)
EOSINOPHILS RELATIVE PERCENT: 4.6 % (ref 0–6)
EPITHELIAL CELLS, UA: ABNORMAL /HPF
GFR AFRICAN AMERICAN: 8
GFR NON-AFRICAN AMERICAN: 8 ML/MIN/1.73
GLUCOSE BLD-MCNC: 110 MG/DL (ref 74–99)
GLUCOSE BLD-MCNC: 121 MG/DL
GLUCOSE URINE: 500 MG/DL
HCG, URINE, POC: NEGATIVE
HCT VFR BLD CALC: 29.1 % (ref 34–48)
HEMOGLOBIN: 9.4 G/DL (ref 11.5–15.5)
IMMATURE GRANULOCYTES #: 0.04 E9/L
IMMATURE GRANULOCYTES %: 0.4 % (ref 0–5)
KETONES, URINE: NEGATIVE MG/DL
LACTIC ACID, SEPSIS: 0.7 MMOL/L (ref 0.5–1.9)
LEUKOCYTE ESTERASE, URINE: NEGATIVE
LYMPHOCYTES ABSOLUTE: 1.6 E9/L (ref 1.5–4)
LYMPHOCYTES RELATIVE PERCENT: 16.1 % (ref 20–42)
Lab: NORMAL
MAGNESIUM: 2.1 MG/DL (ref 1.6–2.6)
MCH RBC QN AUTO: 29.2 PG (ref 26–35)
MCHC RBC AUTO-ENTMCNC: 32.3 % (ref 32–34.5)
MCV RBC AUTO: 90.4 FL (ref 80–99.9)
METER GLUCOSE: 121 MG/DL (ref 74–99)
METER GLUCOSE: 132 MG/DL (ref 74–99)
METER GLUCOSE: 149 MG/DL (ref 74–99)
METER GLUCOSE: 169 MG/DL (ref 74–99)
METER GLUCOSE: 69 MG/DL (ref 74–99)
METER GLUCOSE: 74 MG/DL (ref 74–99)
MONOCYTES ABSOLUTE: 0.58 E9/L (ref 0.1–0.95)
MONOCYTES RELATIVE PERCENT: 5.8 % (ref 2–12)
NEGATIVE QC PASS/FAIL: NORMAL
NEUTROPHILS ABSOLUTE: 7.15 E9/L (ref 1.8–7.3)
NEUTROPHILS RELATIVE PERCENT: 72.2 % (ref 43–80)
NITRITE, URINE: NEGATIVE
PDW BLD-RTO: 14.8 FL (ref 11.5–15)
PH UA: 7 (ref 5–9)
PLATELET # BLD: 123 E9/L (ref 130–450)
PMV BLD AUTO: 10.5 FL (ref 7–12)
POSITIVE QC PASS/FAIL: NORMAL
POTASSIUM SERPL-SCNC: 3.5 MMOL/L (ref 3.5–5)
PROTEIN UA: >=300 MG/DL
RBC # BLD: 3.22 E12/L (ref 3.5–5.5)
RBC UA: ABNORMAL /HPF (ref 0–2)
REASON FOR REJECTION: NORMAL
REJECTED TEST: NORMAL
SODIUM BLD-SCNC: 140 MMOL/L (ref 132–146)
SPECIFIC GRAVITY UA: 1.02 (ref 1–1.03)
TOTAL PROTEIN: 5 G/DL (ref 6.4–8.3)
TROPONIN: 0.06 NG/ML (ref 0–0.03)
UROBILINOGEN, URINE: 0.2 E.U./DL
WBC # BLD: 9.9 E9/L (ref 4.5–11.5)
WBC UA: ABNORMAL /HPF (ref 0–5)

## 2020-11-24 PROCEDURE — 87040 BLOOD CULTURE FOR BACTERIA: CPT

## 2020-11-24 PROCEDURE — 82962 GLUCOSE BLOOD TEST: CPT

## 2020-11-24 PROCEDURE — 81001 URINALYSIS AUTO W/SCOPE: CPT

## 2020-11-24 PROCEDURE — 2580000003 HC RX 258: Performed by: INTERNAL MEDICINE

## 2020-11-24 PROCEDURE — G0378 HOSPITAL OBSERVATION PER HR: HCPCS

## 2020-11-24 PROCEDURE — 2500000003 HC RX 250 WO HCPCS: Performed by: STUDENT IN AN ORGANIZED HEALTH CARE EDUCATION/TRAINING PROGRAM

## 2020-11-24 PROCEDURE — 84484 ASSAY OF TROPONIN QUANT: CPT

## 2020-11-24 PROCEDURE — 83605 ASSAY OF LACTIC ACID: CPT

## 2020-11-24 PROCEDURE — 90945 DIALYSIS ONE EVALUATION: CPT

## 2020-11-24 PROCEDURE — 87088 URINE BACTERIA CULTURE: CPT

## 2020-11-24 PROCEDURE — APPSS45 APP SPLIT SHARED TIME 31-45 MINUTES: Performed by: NURSE PRACTITIONER

## 2020-11-24 PROCEDURE — 93005 ELECTROCARDIOGRAM TRACING: CPT | Performed by: STUDENT IN AN ORGANIZED HEALTH CARE EDUCATION/TRAINING PROGRAM

## 2020-11-24 PROCEDURE — 96374 THER/PROPH/DIAG INJ IV PUSH: CPT

## 2020-11-24 PROCEDURE — 70450 CT HEAD/BRAIN W/O DYE: CPT

## 2020-11-24 PROCEDURE — 83735 ASSAY OF MAGNESIUM: CPT

## 2020-11-24 PROCEDURE — 6360000002 HC RX W HCPCS: Performed by: INTERNAL MEDICINE

## 2020-11-24 PROCEDURE — 2580000003 HC RX 258: Performed by: STUDENT IN AN ORGANIZED HEALTH CARE EDUCATION/TRAINING PROGRAM

## 2020-11-24 PROCEDURE — 6370000000 HC RX 637 (ALT 250 FOR IP): Performed by: NURSE PRACTITIONER

## 2020-11-24 PROCEDURE — 6370000000 HC RX 637 (ALT 250 FOR IP): Performed by: STUDENT IN AN ORGANIZED HEALTH CARE EDUCATION/TRAINING PROGRAM

## 2020-11-24 PROCEDURE — 99223 1ST HOSP IP/OBS HIGH 75: CPT | Performed by: INTERNAL MEDICINE

## 2020-11-24 PROCEDURE — 85025 COMPLETE CBC W/AUTO DIFF WBC: CPT

## 2020-11-24 PROCEDURE — 96375 TX/PRO/DX INJ NEW DRUG ADDON: CPT

## 2020-11-24 PROCEDURE — 96376 TX/PRO/DX INJ SAME DRUG ADON: CPT

## 2020-11-24 PROCEDURE — 71045 X-RAY EXAM CHEST 1 VIEW: CPT

## 2020-11-24 PROCEDURE — 99285 EMERGENCY DEPT VISIT HI MDM: CPT

## 2020-11-24 PROCEDURE — 36415 COLL VENOUS BLD VENIPUNCTURE: CPT

## 2020-11-24 PROCEDURE — 80053 COMPREHEN METABOLIC PANEL: CPT

## 2020-11-24 PROCEDURE — 2580000003 HC RX 258: Performed by: NURSE PRACTITIONER

## 2020-11-24 PROCEDURE — 6360000002 HC RX W HCPCS: Performed by: STUDENT IN AN ORGANIZED HEALTH CARE EDUCATION/TRAINING PROGRAM

## 2020-11-24 RX ORDER — METOCLOPRAMIDE 5 MG/1
5 TABLET ORAL
Status: DISCONTINUED | OUTPATIENT
Start: 2020-11-24 | End: 2020-11-25 | Stop reason: HOSPADM

## 2020-11-24 RX ORDER — 0.9 % SODIUM CHLORIDE 0.9 %
1000 INTRAVENOUS SOLUTION INTRAVENOUS ONCE
Status: DISCONTINUED | OUTPATIENT
Start: 2020-11-24 | End: 2020-11-24

## 2020-11-24 RX ORDER — ATORVASTATIN CALCIUM 40 MG/1
40 TABLET, FILM COATED ORAL NIGHTLY
Status: DISCONTINUED | OUTPATIENT
Start: 2020-11-24 | End: 2020-11-25 | Stop reason: HOSPADM

## 2020-11-24 RX ORDER — HYDRALAZINE HYDROCHLORIDE 20 MG/ML
10 INJECTION INTRAMUSCULAR; INTRAVENOUS ONCE
Status: COMPLETED | OUTPATIENT
Start: 2020-11-24 | End: 2020-11-24

## 2020-11-24 RX ORDER — DIPHENHYDRAMINE HYDROCHLORIDE 50 MG/ML
25 INJECTION INTRAMUSCULAR; INTRAVENOUS ONCE
Status: COMPLETED | OUTPATIENT
Start: 2020-11-24 | End: 2020-11-24

## 2020-11-24 RX ORDER — DEXTROSE MONOHYDRATE 25 G/50ML
25 INJECTION, SOLUTION INTRAVENOUS ONCE
Status: COMPLETED | OUTPATIENT
Start: 2020-11-24 | End: 2020-11-24

## 2020-11-24 RX ORDER — LEVOTHYROXINE SODIUM 0.07 MG/1
75 TABLET ORAL
Status: DISCONTINUED | OUTPATIENT
Start: 2020-11-25 | End: 2020-11-25 | Stop reason: HOSPADM

## 2020-11-24 RX ORDER — HYDRALAZINE HYDROCHLORIDE 20 MG/ML
20 INJECTION INTRAMUSCULAR; INTRAVENOUS EVERY 6 HOURS PRN
Status: DISCONTINUED | OUTPATIENT
Start: 2020-11-24 | End: 2020-11-25 | Stop reason: HOSPADM

## 2020-11-24 RX ORDER — PROCHLORPERAZINE EDISYLATE 5 MG/ML
10 INJECTION INTRAMUSCULAR; INTRAVENOUS ONCE
Status: COMPLETED | OUTPATIENT
Start: 2020-11-24 | End: 2020-11-24

## 2020-11-24 RX ORDER — ACETAMINOPHEN 650 MG/1
650 SUPPOSITORY RECTAL EVERY 6 HOURS PRN
Status: DISCONTINUED | OUTPATIENT
Start: 2020-11-24 | End: 2020-11-25 | Stop reason: HOSPADM

## 2020-11-24 RX ORDER — LABETALOL HYDROCHLORIDE 5 MG/ML
10 INJECTION, SOLUTION INTRAVENOUS ONCE
Status: COMPLETED | OUTPATIENT
Start: 2020-11-24 | End: 2020-11-24

## 2020-11-24 RX ORDER — POLYETHYLENE GLYCOL 3350 17 G/17G
17 POWDER, FOR SOLUTION ORAL DAILY PRN
Status: DISCONTINUED | OUTPATIENT
Start: 2020-11-24 | End: 2020-11-25 | Stop reason: HOSPADM

## 2020-11-24 RX ORDER — METOLAZONE 2.5 MG/1
5 TABLET ORAL DAILY
Status: DISCONTINUED | OUTPATIENT
Start: 2020-11-24 | End: 2020-11-25 | Stop reason: HOSPADM

## 2020-11-24 RX ORDER — SODIUM CHLORIDE 0.9 % (FLUSH) 0.9 %
10 SYRINGE (ML) INJECTION EVERY 12 HOURS SCHEDULED
Status: DISCONTINUED | OUTPATIENT
Start: 2020-11-24 | End: 2020-11-25 | Stop reason: HOSPADM

## 2020-11-24 RX ORDER — CLONIDINE HYDROCHLORIDE 0.1 MG/1
0.1 TABLET ORAL ONCE
Status: COMPLETED | OUTPATIENT
Start: 2020-11-24 | End: 2020-11-24

## 2020-11-24 RX ORDER — DEXTROSE MONOHYDRATE 50 MG/ML
100 INJECTION, SOLUTION INTRAVENOUS PRN
Status: DISCONTINUED | OUTPATIENT
Start: 2020-11-24 | End: 2020-11-25 | Stop reason: HOSPADM

## 2020-11-24 RX ORDER — PROCHLORPERAZINE EDISYLATE 5 MG/ML
10 INJECTION INTRAMUSCULAR; INTRAVENOUS EVERY 6 HOURS PRN
Status: DISCONTINUED | OUTPATIENT
Start: 2020-11-24 | End: 2020-11-25 | Stop reason: HOSPADM

## 2020-11-24 RX ORDER — DEXTROSE AND SODIUM CHLORIDE 5; .45 G/100ML; G/100ML
INJECTION, SOLUTION INTRAVENOUS CONTINUOUS
Status: DISCONTINUED | OUTPATIENT
Start: 2020-11-24 | End: 2020-11-25

## 2020-11-24 RX ORDER — BUMETANIDE 1 MG/1
2 TABLET ORAL DAILY
Status: DISCONTINUED | OUTPATIENT
Start: 2020-11-24 | End: 2020-11-25 | Stop reason: HOSPADM

## 2020-11-24 RX ORDER — DILTIAZEM HYDROCHLORIDE 240 MG/1
240 CAPSULE, COATED, EXTENDED RELEASE ORAL DAILY
Status: DISCONTINUED | OUTPATIENT
Start: 2020-11-24 | End: 2020-11-25 | Stop reason: HOSPADM

## 2020-11-24 RX ORDER — LIDOCAINE HYDROCHLORIDE 10 MG/ML
5 INJECTION, SOLUTION EPIDURAL; INFILTRATION; INTRACAUDAL; PERINEURAL ONCE
Status: DISCONTINUED | OUTPATIENT
Start: 2020-11-24 | End: 2020-11-25 | Stop reason: HOSPADM

## 2020-11-24 RX ORDER — DEXTROSE AND SODIUM CHLORIDE 5; .9 G/100ML; G/100ML
INJECTION, SOLUTION INTRAVENOUS ONCE
Status: DISCONTINUED | OUTPATIENT
Start: 2020-11-24 | End: 2020-11-24 | Stop reason: SDUPTHER

## 2020-11-24 RX ORDER — SODIUM CHLORIDE 0.9 % (FLUSH) 0.9 %
10 SYRINGE (ML) INJECTION PRN
Status: DISCONTINUED | OUTPATIENT
Start: 2020-11-24 | End: 2020-11-25 | Stop reason: HOSPADM

## 2020-11-24 RX ORDER — HEPARIN SODIUM 5000 [USP'U]/ML
5000 INJECTION, SOLUTION INTRAVENOUS; SUBCUTANEOUS EVERY 8 HOURS SCHEDULED
Status: DISCONTINUED | OUTPATIENT
Start: 2020-11-24 | End: 2020-11-25 | Stop reason: HOSPADM

## 2020-11-24 RX ORDER — DEXTROSE MONOHYDRATE 25 G/50ML
12.5 INJECTION, SOLUTION INTRAVENOUS PRN
Status: DISCONTINUED | OUTPATIENT
Start: 2020-11-24 | End: 2020-11-25 | Stop reason: HOSPADM

## 2020-11-24 RX ORDER — METOPROLOL TARTRATE 50 MG/1
100 TABLET, FILM COATED ORAL 2 TIMES DAILY
Status: DISCONTINUED | OUTPATIENT
Start: 2020-11-24 | End: 2020-11-25 | Stop reason: HOSPADM

## 2020-11-24 RX ORDER — POTASSIUM CHLORIDE 20 MEQ/1
20 TABLET, EXTENDED RELEASE ORAL DAILY
Status: DISCONTINUED | OUTPATIENT
Start: 2020-11-24 | End: 2020-11-25 | Stop reason: HOSPADM

## 2020-11-24 RX ORDER — SODIUM CHLORIDE 0.9 % (FLUSH) 0.9 %
10 SYRINGE (ML) INJECTION PRN
Status: DISCONTINUED | OUTPATIENT
Start: 2020-11-24 | End: 2020-11-24 | Stop reason: SDUPTHER

## 2020-11-24 RX ORDER — SODIUM CHLORIDE 0.9 % (FLUSH) 0.9 %
10 SYRINGE (ML) INJECTION EVERY 12 HOURS SCHEDULED
Status: DISCONTINUED | OUTPATIENT
Start: 2020-11-24 | End: 2020-11-24 | Stop reason: SDUPTHER

## 2020-11-24 RX ORDER — ACETAMINOPHEN 325 MG/1
650 TABLET ORAL EVERY 6 HOURS PRN
Status: DISCONTINUED | OUTPATIENT
Start: 2020-11-24 | End: 2020-11-25 | Stop reason: HOSPADM

## 2020-11-24 RX ORDER — NICOTINE POLACRILEX 4 MG
15 LOZENGE BUCCAL PRN
Status: DISCONTINUED | OUTPATIENT
Start: 2020-11-24 | End: 2020-11-25 | Stop reason: HOSPADM

## 2020-11-24 RX ADMIN — METOLAZONE 5 MG: 2.5 TABLET ORAL at 20:45

## 2020-11-24 RX ADMIN — CLONIDINE HYDROCHLORIDE 0.1 MG: 0.1 TABLET ORAL at 12:12

## 2020-11-24 RX ADMIN — BUMETANIDE 2 MG: 1 TABLET ORAL at 18:46

## 2020-11-24 RX ADMIN — POTASSIUM CHLORIDE 20 MEQ: 20 TABLET, EXTENDED RELEASE ORAL at 18:45

## 2020-11-24 RX ADMIN — METOPROLOL TARTRATE 100 MG: 25 TABLET, FILM COATED ORAL at 12:12

## 2020-11-24 RX ADMIN — DIPHENHYDRAMINE HYDROCHLORIDE 25 MG: 50 INJECTION, SOLUTION INTRAMUSCULAR; INTRAVENOUS at 13:55

## 2020-11-24 RX ADMIN — LEVETIRACETAM 1000 MG: 100 INJECTION, SOLUTION INTRAVENOUS at 15:09

## 2020-11-24 RX ADMIN — PROCHLORPERAZINE EDISYLATE 10 MG: 5 INJECTION INTRAMUSCULAR; INTRAVENOUS at 13:55

## 2020-11-24 RX ADMIN — METOPROLOL TARTRATE 100 MG: 50 TABLET, FILM COATED ORAL at 20:45

## 2020-11-24 RX ADMIN — DEXTROSE MONOHYDRATE 25 G: 500 INJECTION PARENTERAL at 11:05

## 2020-11-24 RX ADMIN — SODIUM CHLORIDE, PRESERVATIVE FREE 10 ML: 5 INJECTION INTRAVENOUS at 18:52

## 2020-11-24 RX ADMIN — HYDRALAZINE HYDROCHLORIDE 10 MG: 20 INJECTION INTRAMUSCULAR; INTRAVENOUS at 11:11

## 2020-11-24 RX ADMIN — ACETAMINOPHEN 650 MG: 325 TABLET, FILM COATED ORAL at 22:03

## 2020-11-24 RX ADMIN — DEXTROSE MONOHYDRATE 25 G: 25 INJECTION, SOLUTION INTRAVENOUS at 15:25

## 2020-11-24 RX ADMIN — METOCLOPRAMIDE 5 MG: 5 TABLET ORAL at 20:45

## 2020-11-24 RX ADMIN — DILTIAZEM HYDROCHLORIDE 240 MG: 240 CAPSULE, COATED, EXTENDED RELEASE ORAL at 18:45

## 2020-11-24 RX ADMIN — LABETALOL HYDROCHLORIDE 10 MG: 5 INJECTION INTRAVENOUS at 13:55

## 2020-11-24 RX ADMIN — ATORVASTATIN CALCIUM 40 MG: 40 TABLET, FILM COATED ORAL at 20:45

## 2020-11-24 RX ADMIN — HYDRALAZINE HYDROCHLORIDE 10 MG: 20 INJECTION INTRAMUSCULAR; INTRAVENOUS at 15:09

## 2020-11-24 RX ADMIN — DEXTROSE AND SODIUM CHLORIDE: 5; 450 INJECTION, SOLUTION INTRAVENOUS at 18:49

## 2020-11-24 ASSESSMENT — ENCOUNTER SYMPTOMS
WHEEZING: 0
DIARRHEA: 0
TROUBLE SWALLOWING: 0
ABDOMINAL DISTENTION: 0
SHORTNESS OF BREATH: 0
CHEST TIGHTNESS: 0
SORE THROAT: 0
VOMITING: 0
COUGH: 0
SINUS PRESSURE: 0
VOICE CHANGE: 0
COLOR CHANGE: 0
NAUSEA: 0
BACK PAIN: 0

## 2020-11-24 ASSESSMENT — PAIN DESCRIPTION - PAIN TYPE: TYPE: ACUTE PAIN

## 2020-11-24 ASSESSMENT — PAIN SCALES - GENERAL
PAINLEVEL_OUTOF10: 0
PAINLEVEL_OUTOF10: 10
PAINLEVEL_OUTOF10: 10

## 2020-11-24 ASSESSMENT — PAIN DESCRIPTION - DESCRIPTORS: DESCRIPTORS: ACHING

## 2020-11-24 ASSESSMENT — PAIN DESCRIPTION - FREQUENCY: FREQUENCY: INTERMITTENT

## 2020-11-24 ASSESSMENT — PAIN DESCRIPTION - LOCATION: LOCATION: GENERALIZED

## 2020-11-24 NOTE — ED NOTES
Lab called. CMP, Magnesium and troponin rejected by the machine for hemolysis.      Glory Ramírez RN  11/24/20 [id number]

## 2020-11-24 NOTE — TELEPHONE ENCOUNTER
Ivett Read from virology called to inform that this patients stool sample for cdiff was rejected because it was not watery stool it was soft formed stool and can not be used for this test

## 2020-11-24 NOTE — H&P
[facility] [place] [address] [facility]   History and Physical      CHIEF COMPLAINT:   lethargy    History of Present Illness:  31 y.o. 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 Patient was just discharged from [address] [place] [address] on 11/22 after 2 day hospital stay for new onset seizures. Patient is alert but is a poor historian at this time. HPI obtained from ER note. Patient had witnessed seizure at home. Upon arrival of EMS, her blood glucose was 40 and she was treated with glucagon. BG improved to 80. Patient states she does not recall what led to her coming to the ER and does not recall why she was recently hospitalized. She told EMS her BG was 300 last night and she was using her insulin to control it. Patient was found to be hypothermic in ER and is currently on bear hugger warmer. Reyes placed in ER. Informant(s) for H&P: EMR    REVIEW OF SYSTEMS:  no fevers, chills, cp, sob, n/v, ha, vision/hearing changes, wt changes, hot/cold flashes, other open skin lesions, diarrhea, constipation, dysuria/hematuria unless noted in HPI. Complete ROS performed with the patient and is otherwise negative.       PMH:  Past Medical History:   Diagnosis Date    Acute congestive heart failure ([address]     BC (acute kidney injury) ([address] 10/1/2019    Cephalgia 10/9/2019    Chronic kidney disease     Depression     Diabetes mellitus ([address]     Diabetic ketoacidosis ([address] 8/27/2011    Hemod[facility]     History of blood transfusion 11/2019    Hyperlipidemia 10/8/2020    Hypothyroidism 10/8/2020    Iron deficiency anemia 10/1/2019    MDRO (multiple drug resistant organisms) resistance     MRSA (methicillin resistant Staphylococcus aureus)     back wound abcess    Non compliance w medication regimen 3/30/2016    Other disorders of kidney and ureter     Pregnancy 3/30/2016    16 weeks    Seizure ([address] 2020    Severe pre-eclampsia in third trimester 2016       Surgical History:  Past Surgical History:   Procedure Laterality Date    BACK SURGERY      abscess     SECTION      x2    CHOLECYSTECTOMY, LAPAROSCOPIC N/A 2019    CHOLECYSTECTOMY LAPAROSCOPIC performed by Wendy Kent MD at [address] N/A 2018    COLONOSCOPY WITH BIOPSY performed by Brenda Damico MD at [address] [facility] N/A 2018    COLONOSCOPY WITH BIOPSY performed by Sweetie Urban MD at [address]  2012    EF 57%    ECHO COMPL W DOP COLOR FLOW  6/10/2013         EMBOLECTOMY N/A 2020    [address], [facility], [place] -- REQS ROOM 3 performed by Jef Ahumada MD at [address] [facility] N/A 2020    LAPAROSCOPIC INSERTION PERITONEAL DIALYSIS CATHETER performed by Feliz Coyne MD at [place] [address] ESOPHAGOGASTRODUODENOSCOPY TRANSORAL DIAGNOSTIC N/A 2018    EGD ESOPHAGOGASTRODUODENOSCOPY performed by Brenda Damico MD at [address] [facility] TRANSESOPHAGEAL ECHOCARDIOGRAM N/A 10/19/2020    TRANSESOPHAGEAL ECHOCARDIOGRAM WITH BUBBLE STUDY performed by Zahira Hall MD at [address] [facility] [address] [facility]  2018    UPPER GASTROINTESTINAL ENDOSCOPY  2018    EGD BIOPSY performed by Sweetie Urban MD at [address] [place] [address] 10/11/2019    EGD ESOPHAGOGASTRODUODENOSCOPY performed by Brie Angelo DO at [place] [address]       Medications Prior to Admission:    Prior to Admission medications    Medication Sig Start Date End Date Taking?  Authorizing Provider   insulin lispro (HUMALOG) 100 UNIT/ML injection vial Inject 2 Units into the skin 3 times daily (with meals) 20   Bridgette Brown MD   insulin lispro (HUMALOG) 100 UNIT/ML injection vial Inject 0-4 Units into the skin 4 times daily (before meals and nightly) Glucose: Dose:              No Insulin 181-280 1 Unit 281-380 2 Units 381-480 3 Units Over 480 4 Units 11/22/20   Abiola Calixto MD   insulin glargine (LANTUS) 100 UNIT/ML injection vial Inject 6 Units into the skin nightly  Patient taking differently: Inject 6 Units into the skin nightly Taking 10 unit in am and 6 units in pm 11/22/20   Abiola Calixto MD   bumetanide (BUMEX) 1 MG tablet Take 2 mg by mouth daily    Historical Provider, MD   metoclopramide (REGLAN) 5 MG tablet Take 5 mg by mouth 4 times daily (before meals and nightly)    Historical Provider, MD   metoprolol (LOPRESSOR) 100 MG tablet Take 100 mg by mouth 2 times daily    Historical Provider, MD   potassium chloride (KLOR-CON M) 20 MEQ extended release tablet Take 20 mEq by mouth daily    Historical Provider, MD   levothyroxine (SYNTHROID) 75 MCG tablet Take 1 tablet by mouth every morning (before breakfast) 11/12/20   Chas Capone MD   metOLazone (ZAROXOLYN) 5 MG tablet Take 5 mg by mouth daily    Historical Provider, MD   cloNIDine (CATAPRES) 0.1 MG tablet Take 0.1 mg by mouth 2 times daily as needed for High Blood Pressure    Historical Provider, MD   atorvastatin (LIPITOR) 40 MG tablet Take 1 tablet by mouth nightly 9/3/20   [place] MD   dilTIAZem (CARDIZEM CD) 240 MG extended release capsule Take 1 capsule by mouth daily 9/3/20   [place] MD       Allergies:    Cefepime and Toradol [ketorolac tromethamine]    Social History:    reports that she has been smoking cigarettes. She has a 6.00 pack-year smoking history. She uses smokeless tobacco. She reports that she does not drink alcohol or use drugs. Family History:   family history includes Asthma in her brother and mother; Diabetes in her mother; High Blood Pressure in her father and mother; Hypertension in her mother.      PHYSICAL EXAM:  Vitals:  BP (!) 165/107   Pulse 95   Temp 97.5 °F (36.4 °C) (Core)   Resp 15   SpO2 99%     General Appearance: alert and oriented to person, place,  and in no acute distress  Skin: warm and dry, LUE ecchymosis  Head: normocephalic and atraumatic  Eyes: pupils equal, round, and reactive to light, extraocular eye movements intact, conjunctivae normal  Neck: neck supple and non tender without mass   Pulmonary/Chest: clear to auscultation bilaterally- no wheezes, rales or rhonchi, normal air movement, no respiratory distress  Cardiovascular: normal rate, normal S1 and S2 and no carotid bruits  Abdomen: soft, non-tender, non-distended, normal bowel sounds, no masses or organomegaly  Extremities: no cyanosis, no clubbing and no edema  Neurologic: no cranial nerve deficit and speech normal  : arceo with yellow urine    LABS:  Recent Labs     11/22/20  0630 11/24/20  1019 11/24/20  1310     --  140   K 2.9*  --  3.5     --  102   CO2 22  --  22   BUN 54*  --  50*   CREATININE 7.5*  --  7.5*   GLUCOSE 108* 121 110*   CALCIUM 8.1*  --  7.8*       Recent Labs     11/22/20  0630 11/24/20  1035   WBC 7.3 9.9   RBC 3.01* 3.22*   HGB 9.0* 9.4*   HCT 26.5* 29.1*   MCV 88.0 90.4   MCH 29.9 29.2   MCHC 34.0 32.3   RDW 14.6 14.8    123*   MPV 10.8 10.5       No results for input(s): POCGLU in the last 72 hours. Radiology: Ct Head Wo Contrast    Result Date: 11/24/2020  EXAMINATION: CT OF THE HEAD WITHOUT CONTRAST  11/24/2020 7:23 am TECHNIQUE: CT of the head was performed without the administration of intravenous contrast. Dose modulation, iterative reconstruction, and/or weight based adjustment of the mA/kV was utilized to reduce the radiation dose to as low as reasonably achievable. COMPARISON: Head CT 11/20/2020. MRI brain 11/20/2020. HISTORY: ORDERING SYSTEM PROVIDED HISTORY: altered TECHNOLOGIST PROVIDED HISTORY: Reason for exam:->altered Has a \"code stroke\" or \"stroke alert\" been called? ->No FINDINGS: BRAIN/VENTRICLES: There is no acute intracranial hemorrhage, mass effect or midline shift. No abnormal extra-axial fluid collection. The gray-white differentiation is maintained without evidence of an acute infarct. There is no evidence of hydrocephalus. Mild burden of patchy white matter hypoattenuation is similar to the previous exams. ORBITS: The visualized portion of the orbits demonstrate no acute abnormality. SINUSES: The visualized paranasal sinuses and mastoid air cells demonstrate no acute abnormality. SOFT TISSUES/SKULL:  No acute abnormality of the visualized skull or soft tissues. 1. No acute intracranial abnormality. 2. Mild burden of patchy white matter hypoattenuation is similar to the previous exams. Please see the prior MRI brain report from 11/20/2020 for further information. Xr Chest Portable    Result Date: 11/24/2020  EXAMINATION: ONE XRAY VIEW OF THE CHEST 11/24/2020 8:43 am COMPARISON: One-view chest x-ray 11/20/2020 HISTORY: ORDERING SYSTEM PROVIDED HISTORY: hypothermia TECHNOLOGIST PROVIDED HISTORY: Reason for exam:->hypothermia FINDINGS: The cardiac silhouette is within normal limits. The mediastinal contours are within normal limits. No consolidations are identified. No significant pleural effusions or pneumothorax. No osseous abnormality is identified. EKG leads overlie the chest.  There is interval removal of the right PICC seen on the previous exam.    No acute cardiopulmonary process is identified. EKG: This EKG is signed and interpreted by ED physician. Heart rate 84. Normal sinus rhythm. Normal axis deviation. QTc prolonged at 501. No ST elevations or depressions. Stable as compared to previous EKG on 11/20/2020.      ASSESSMENT:      Active Problems:    Poorly controlled type 1 diabetes mellitus (HCC)    Hypoglycemia    Hypothermia    Hypertension    ESRD on peritoneal dialysis ([facility]    MTHFR mutation (HCC)    Iron deficiency anemia    Hypothyroidism    Seizure ([facility]  Resolved Problems:    * No resolved hospital problems. *      PLAN:    1. Type I DM: poorly controlled, with hypoglycemia:  in ER. Hold home insulin for now, add low dose insulin slide scale, carb controlled diet. Recent A1c 6.2  Start IVF D5.045 NS @ 100ml/hr x 10 hours. 2. Hypothermia: secondary to hypoglycemia. Temp 93.7 in ER. Placed on Wade hugger warmer, continue until core temp maintained between 96.8 -99.5F. 3. ESRD on PD: consult nephrology  4. HTN urgency: /156 on arrival with max  216/124. Received clonidine, hydralazine, labetalol. Rresume home Cardizem, metoprolol, add prn hydralazine. 5. Thyroid disorder:  Recent TSH 4.480, resume home levothyroxine  6. Seizure disorder: secondary to hypoglycemia. Was seen by neurology prior admission and was to start oxcarbazepine but patient had been reluctant to start a new medication. Will hold off for now as she was not discharged on it last admission.        Code Status:  full  DVT prophylaxis:  Heparin SQ       Electronically signed by HEBER Estrada CNP on 11/24/2020 at 4:10 PM

## 2020-11-24 NOTE — ED PROVIDER NOTES
[address] ENCOUNTER      Pt Name: Vanessa Rodriguez  MRN: [id number]  [place] 1990  Date of evaluation: 11/24/2020      CHIEF COMPLAINT       Chief Complaint   Patient presents with    Hypoglycemia        HPI  Vanessa Rodriguez is a 31 y.o. female with past medical history of insulin-dependent diabetes, hypertension, on peritoneal dialysis presents lethargic at home. As per EMS patient will witnessed seizure at home where she was convulsing. When they got to her stated. BGL was 40. She was given glucagon. Patient started to \"wake up. \"  Her repeat BGL was 80. Patient states that last night her blood sugar was in the 300s. She was attempting to bring it down by taking insulin. The last thing she remembers is waking up in an ambulance. She has no other associated symptoms. Denies any headache, nausea, vomiting, chest pain, shortness of breath, abdominal pain, flank pain, dysuria, hematuria, diarrhea, constipation, new rashes or sores. Except as noted above the remainder of the review of systems was reviewed and negative. Review of Systems   Constitutional: Negative for chills and fever. HENT: Negative for ear pain, sinus pressure, sore throat, trouble swallowing and voice change. Eyes: Negative for visual disturbance. Respiratory: Negative for cough, chest tightness, shortness of breath and wheezing. Cardiovascular: Negative for chest pain, palpitations and leg swelling. Gastrointestinal: Negative for abdominal distention, diarrhea, nausea and vomiting. Endocrine: Negative for polyuria. Genitourinary: Negative for dysuria and frequency. Musculoskeletal: Positive for myalgias. Negative for arthralgias and back pain. Skin: Negative for color change, pallor, rash and wound. Neurological: Negative for weakness and headaches. Hematological: Negative for adenopathy.    Psychiatric/Behavioral: Negative for agitation. All other systems reviewed and are negative. Physical Exam  Vitals signs and nursing note reviewed. Constitutional:       General: She is not in acute distress. Appearance: Normal appearance. She is not ill-appearing or diaphoretic. HENT:      Head: Normocephalic and atraumatic. Nose: Nose normal.      Mouth/Throat:      Mouth: Mucous membranes are moist.      Pharynx: Oropharynx is clear. Eyes:      Extraocular Movements: Extraocular movements intact. Conjunctiva/sclera: Conjunctivae normal.      Pupils: Pupils are equal, round, and reactive to light. Neck:      Musculoskeletal: Normal range of motion. No neck rigidity. Cardiovascular:      Rate and Rhythm: Normal rate and regular rhythm. Pulses: Normal pulses. Heart sounds: Normal heart sounds. Pulmonary:      Effort: Pulmonary effort is normal.      Breath sounds: Normal breath sounds. Abdominal:      General: Abdomen is flat. Bowel sounds are normal. There is no distension. Palpations: Abdomen is soft. There is no mass. Tenderness: There is no abdominal tenderness. There is no right CVA tenderness, left CVA tenderness, guarding or rebound. Negative signs include Chauhan's sign, Rovsing's sign and McBurney's sign. Hernia: No hernia is present. Musculoskeletal: Normal range of motion. Skin:     General: Skin is warm and dry. Capillary Refill: Capillary refill takes less than 2 seconds. Neurological:      General: No focal deficit present. Mental Status: She is alert and oriented to person, place, and time. Psychiatric:      Comments: Anxious, crying          Procedures     MDM  Number of Diagnoses or Management Options  Diagnosis management comments: 82-year-old female with a past medical history of ESRD on peritoneal dialysis at home, insulin dependent diabetic, presents lethargic. EMS was called to see patient as she was minimally responsive.   On scene found the patient to be confused with a blood glucose of 40. She was given glucagon. When her blood glucose became 80 she was more awake and alert. Patient states that she was try control her blood glucose levels last night as they were in the 300s but taking more insulin. States that \"the last thing she remembers after that is waking up in the ambulance. \"  Vitals significant for hypertension 198/156. As well as a temperature of 93.7. Her blood glucose here is 125. From the patient's presentation her low temperature septic work-up was initiated. On physical exam patient is in no acute distress, speaking in full sentences, and crying. No meningeal signs. Lungs clear to auscultation bilaterally. No wheezes rales rhonchi. Normal S1-S2. No murmurs rubs or gallops. Abdomen soft, nontender, no rebound or guarding. No focal neurologic deficits. Cranials 2 through 12 grossly intact. Diagnostic labs unremarkable. She does not have a leukocytosis. Her H&H is stable. Her urinalysis is negative for process. She is not pregnant. She does have an elevated creatinine of 7.5. This is her baseline. CT head was negative for any acute process. EKG shows heart rate of 84 with normal sinus rhythm with a normal axis deviation. With a prolonged QTC of 501. Patient has required a bear hugger as she was hypothermic. She is still only 96.4°F on repeat check. Her blood pressure has been elevated despite giving multiple treatments of hydralazine and labetalol as well as patient's home medications. Patient was given Compazine and Benadryl with mild relief of her headache. Dr. Darwin Pabon was consulted. He will be admitting patient to telemetry for observation. Patient is agreeable with plan. In the department patient has been given multiple rounds of labetalol, hydralazine, and loaded with Keppra.        ED Course as of Nov 24 1444   Tue Nov 24, 2020   1014 ATTENDING PROVIDER ATTESTATION:     I have personally performed and/or participated in the history, exam, medical decision making, and procedures and agree with all pertinent clinical information unless otherwise noted. I have also reviewed and agree with the past medical, family and social history unless otherwise noted. I have discussed this patient in detail with the resident, and provided the instruction and education regarding patient here with hypoglycemia. Apparently had a seizure today, has a history of seizures recently diagnosed and is a brittle diabetic. States that she just does not feel well. Denies abdominal pain or chest pain or shortness of breath but cannot really state how she feels otherwise other than not feeling well. .  My findings/plan: Patient with no sign of acute head or face injury. Abdomen soft and nontender. Heart rate regular, lungs are clear and equal.  She is cool to touch but is perfusing well in all extremities. She has no focal neurologic deficit. Blood pressure was initially noted to be elevated. We will recheck that. Septic work-up for hypoglycemia and hypotension. [NC]   4336 And peripheral IV was unable to be obtained. An ultrasounded guided IV was placed on patient's left brachial vein using a 20-gauge Angiocath. Patient tolerated procedure well. [JV]   1104 Patient's blood pressure is elevated now to 204/125. Patient was given hydralazine 10 mg IV to mitigate patient's blood pressure. [JV]   1105 CT head negative for any acute process. CT Head WO Contrast [JV]   1417 This patient's baseline creatinine. Creatinine(!): 7.5 [JV]   1438 CRITICAL CARE:   32 MINUTES. Please note that the withdrawal or failure to initiate urgent interventions for this patient would likely result in a life threatening deterioration or permanent disability. Accordingly this patient received the above mentioned time, excluding separately billable procedures. [NC]   9738 Evaluated patient at bedside.   She is unremarkable. However complaining of a mild headache. She speaking in full sentences. ANO x3.  Abdomen soft, nontender, no rebound or guarding still. Call was made to Dr. Venita Rutherford he will admit patient to telemetry. Patient is agreeable with plan. [JV]      ED Course User Index  [JV] Verner Stabile, MD  [NC] Hemanth Mt, DO             --------------------------------------------- PAST HISTORY ---------------------------------------------  Past Medical History:  has a past medical history of Acute congestive heart failure ([facility] [address] BC (acute kidney injury) ([facility] [address] Cephalgia, Chronic kidney disease, Depression, Diabetes mellitus ([address] Diabetic ketoacidosis ([facility] [address] Hemodialysis patient ([facility] [address] History of blood transfusion, Hyperlipidemia, Hypothyroidism, Iron deficiency anemia, MDRO (multiple drug resistant organisms) resistance, MRSA (methicillin resistant Staphylococcus aureus), Non compliance w medication regimen, Other disorders of kidney and ureter, Pregnancy, Seizure ([address] and Severe pre-eclampsia in third trimester. Past Surgical History:  has a past surgical history that includes ECHO Compl W Dop Color Flow (2012); ECHO Compl W Dop Color Flow (6/10/2013);  section; Tunneled venous port placement (2018); pr esophagogastroduodenoscopy transoral diagnostic (N/A, 2018); back surgery; Colonoscopy (N/A, 2018); Colonoscopy (N/A, 2018); Upper gastrointestinal endoscopy (2018); Upper gastrointestinal endoscopy (N/A, 10/11/2019); Cholecystectomy, laparoscopic (N/A, 2019); LAPAROSCOPY INSERTION PERITONEAL CATHETER (N/A, 2020); transesophageal echocardiogram (N/A, 10/19/2020); and embolectomy (N/A, 2020). Social History:  reports that she has been smoking cigarettes. She has a 6.00 pack-year smoking history. She uses smokeless tobacco. She reports that she does not drink alcohol or use drugs.     Family History: family history includes Asthma in her UA 2-5 0 - 2 /HPF    Epithelial Cells, UA FEW /HPF    Bacteria, UA FEW (A) None Seen /HPF   Comprehensive metabolic panel   Result Value Ref Range    Sodium 140 132 - 146 mmol/L    Potassium 3.5 3.5 - 5.0 mmol/L    Chloride 102 98 - 107 mmol/L    CO2 22 22 - 29 mmol/L    Anion Gap 16 7 - 16 mmol/L    Glucose 110 (H) 74 - 99 mg/dL    BUN 50 (H) 6 - 20 mg/dL    CREATININE 7.5 (H) 0.5 - 1.0 mg/dL    GFR Non-African American 8 >=60 mL/min/1.73    GFR African American 8     Calcium 7.8 (L) 8.6 - 10.2 mg/dL    Total Protein 5.0 (L) 6.4 - 8.3 g/dL    Alb 3.1 (L) 3.5 - 5.2 g/dL    Total Bilirubin 0.2 0.0 - 1.2 mg/dL    Alkaline Phosphatase 107 (H) 35 - 104 U/L    ALT 19 0 - 32 U/L    AST 39 (H) 0 - 31 U/L   Magnesium   Result Value Ref Range    Magnesium 2.1 1.6 - 2.6 mg/dL   Troponin   Result Value Ref Range    Troponin 0.06 (H) 0.00 - 0.03 ng/mL   SPECIMEN REJECTION   Result Value Ref Range    Rejected Test cmp,mg,trop     Reason for Rejection see below    POCT glucose   Result Value Ref Range    Glucose 121 mg/dL    QC OK? yes    POC Pregnancy Urine   Result Value Ref Range    HCG, Urine, POC Negative Negative    Lot Number [id number]     Positive QC Pass/Fail Pass     Negative QC Pass/Fail Pass    POCT Glucose   Result Value Ref Range    Meter Glucose 121 (H) 74 - 99 mg/dL   POCT Glucose   Result Value Ref Range    Meter Glucose 169 (H) 74 - 99 mg/dL   EKG 12 Lead   Result Value Ref Range    Ventricular Rate 84 BPM    Atrial Rate 84 BPM    P-R Interval 134 ms    QRS Duration 82 ms    Q-T Interval 424 ms    QTc Calculation (Bazett) 501 ms    P Axis 72 degrees    R Axis 52 degrees    T Axis 68 degrees       RADIOLOGY:  XR CHEST PORTABLE   Final Result   No acute cardiopulmonary process is identified. CT Head WO Contrast   Final Result   1. No acute intracranial abnormality. 2. Mild burden of patchy white matter hypoattenuation is similar to the   previous exams.   Please see the prior MRI brain report from

## 2020-11-25 VITALS
TEMPERATURE: 99 F | DIASTOLIC BLOOD PRESSURE: 72 MMHG | WEIGHT: 140 LBS | OXYGEN SATURATION: 97 % | RESPIRATION RATE: 18 BRPM | SYSTOLIC BLOOD PRESSURE: 140 MMHG | HEIGHT: 64 IN | HEART RATE: 101 BPM | BODY MASS INDEX: 23.9 KG/M2

## 2020-11-25 LAB
ADENOVIRUS BY PCR: NOT DETECTED
ALBUMIN SERPL-MCNC: 3.3 G/DL (ref 3.5–5.2)
ALP BLD-CCNC: 115 U/L (ref 35–104)
ALT SERPL-CCNC: 35 U/L (ref 0–32)
ANION GAP SERPL CALCULATED.3IONS-SCNC: 15 MMOL/L (ref 7–16)
AST SERPL-CCNC: 68 U/L (ref 0–31)
BILIRUB SERPL-MCNC: <0.2 MG/DL (ref 0–1.2)
BORDETELLA PARAPERTUSSIS BY PCR: NOT DETECTED
BORDETELLA PERTUSSIS BY PCR: NOT DETECTED
BUN BLDV-MCNC: 47 MG/DL (ref 6–20)
CALCIUM SERPL-MCNC: 8 MG/DL (ref 8.6–10.2)
CHLAMYDOPHILIA PNEUMONIAE BY PCR: NOT DETECTED
CHLORIDE BLD-SCNC: 95 MMOL/L (ref 98–107)
CO2: 22 MMOL/L (ref 22–29)
CORONAVIRUS 229E BY PCR: NOT DETECTED
CORONAVIRUS HKU1 BY PCR: NOT DETECTED
CORONAVIRUS NL63 BY PCR: NOT DETECTED
CORONAVIRUS OC43 BY PCR: NOT DETECTED
CREAT SERPL-MCNC: 7.2 MG/DL (ref 0.5–1)
EKG ATRIAL RATE: 84 BPM
EKG P AXIS: 72 DEGREES
EKG P-R INTERVAL: 134 MS
EKG Q-T INTERVAL: 404 MS
EKG QRS DURATION: 82 MS
EKG QTC CALCULATION (BAZETT): 478 MS
EKG R AXIS: 52 DEGREES
EKG T AXIS: 68 DEGREES
EKG VENTRICULAR RATE: 84 BPM
GFR AFRICAN AMERICAN: 8
GFR NON-AFRICAN AMERICAN: 8 ML/MIN/1.73
GLUCOSE BLD-MCNC: 618 MG/DL (ref 74–99)
HCT VFR BLD CALC: 29.1 % (ref 34–48)
HEMOGLOBIN: 9.6 G/DL (ref 11.5–15.5)
HUMAN METAPNEUMOVIRUS BY PCR: NOT DETECTED
HUMAN RHINOVIRUS/ENTEROVIRUS BY PCR: NOT DETECTED
INFLUENZA A BY PCR: NOT DETECTED
INFLUENZA B BY PCR: NOT DETECTED
MCH RBC QN AUTO: 29.8 PG (ref 26–35)
MCHC RBC AUTO-ENTMCNC: 33 % (ref 32–34.5)
MCV RBC AUTO: 90.4 FL (ref 80–99.9)
METER GLUCOSE: 162 MG/DL (ref 74–99)
METER GLUCOSE: 394 MG/DL (ref 74–99)
METER GLUCOSE: >500 MG/DL (ref 74–99)
MYCOPLASMA PNEUMONIAE BY PCR: NOT DETECTED
PARAINFLUENZA VIRUS 1 BY PCR: NOT DETECTED
PARAINFLUENZA VIRUS 2 BY PCR: NOT DETECTED
PARAINFLUENZA VIRUS 3 BY PCR: NOT DETECTED
PARAINFLUENZA VIRUS 4 BY PCR: NOT DETECTED
PDW BLD-RTO: 15.1 FL (ref 11.5–15)
PLATELET # BLD: 186 E9/L (ref 130–450)
PMV BLD AUTO: 11.7 FL (ref 7–12)
POTASSIUM SERPL-SCNC: 4.9 MMOL/L (ref 3.5–5)
RBC # BLD: 3.22 E12/L (ref 3.5–5.5)
RESPIRATORY SYNCYTIAL VIRUS BY PCR: NOT DETECTED
SARS-COV-2, PCR: NOT DETECTED
SODIUM BLD-SCNC: 132 MMOL/L (ref 132–146)
TOTAL PROTEIN: 5.5 G/DL (ref 6.4–8.3)
VANCOMYCIN RANDOM: 25.9 MCG/ML (ref 5–40)
WBC # BLD: 7.3 E9/L (ref 4.5–11.5)

## 2020-11-25 PROCEDURE — 80202 ASSAY OF VANCOMYCIN: CPT

## 2020-11-25 PROCEDURE — 99233 SBSQ HOSP IP/OBS HIGH 50: CPT | Performed by: INTERNAL MEDICINE

## 2020-11-25 PROCEDURE — G0378 HOSPITAL OBSERVATION PER HR: HCPCS

## 2020-11-25 PROCEDURE — 6370000000 HC RX 637 (ALT 250 FOR IP): Performed by: INTERNAL MEDICINE

## 2020-11-25 PROCEDURE — 96372 THER/PROPH/DIAG INJ SC/IM: CPT

## 2020-11-25 PROCEDURE — 96376 TX/PRO/DX INJ SAME DRUG ADON: CPT

## 2020-11-25 PROCEDURE — 36415 COLL VENOUS BLD VENIPUNCTURE: CPT

## 2020-11-25 PROCEDURE — 96365 THER/PROPH/DIAG IV INF INIT: CPT

## 2020-11-25 PROCEDURE — 6370000000 HC RX 637 (ALT 250 FOR IP): Performed by: NURSE PRACTITIONER

## 2020-11-25 PROCEDURE — 0202U NFCT DS 22 TRGT SARS-COV-2: CPT

## 2020-11-25 PROCEDURE — 6360000002 HC RX W HCPCS: Performed by: NURSE PRACTITIONER

## 2020-11-25 PROCEDURE — 6360000002 HC RX W HCPCS: Performed by: INTERNAL MEDICINE

## 2020-11-25 PROCEDURE — 85027 COMPLETE CBC AUTOMATED: CPT

## 2020-11-25 PROCEDURE — 2580000003 HC RX 258: Performed by: NURSE PRACTITIONER

## 2020-11-25 PROCEDURE — 80053 COMPREHEN METABOLIC PANEL: CPT

## 2020-11-25 PROCEDURE — 82962 GLUCOSE BLOOD TEST: CPT

## 2020-11-25 RX ORDER — INSULIN GLARGINE 100 [IU]/ML
4 INJECTION, SOLUTION SUBCUTANEOUS ONCE
Status: COMPLETED | OUTPATIENT
Start: 2020-11-25 | End: 2020-11-25

## 2020-11-25 RX ORDER — INSULIN GLARGINE 100 [IU]/ML
5 INJECTION, SOLUTION SUBCUTANEOUS ONCE
Status: DISCONTINUED | OUTPATIENT
Start: 2020-11-25 | End: 2020-11-25

## 2020-11-25 RX ADMIN — BUMETANIDE 2 MG: 1 TABLET ORAL at 08:13

## 2020-11-25 RX ADMIN — LEVOTHYROXINE SODIUM 75 MCG: 0.07 TABLET ORAL at 06:37

## 2020-11-25 RX ADMIN — DILTIAZEM HYDROCHLORIDE 240 MG: 240 CAPSULE, COATED, EXTENDED RELEASE ORAL at 08:13

## 2020-11-25 RX ADMIN — HEPARIN SODIUM 5000 UNITS: 5000 INJECTION INTRAVENOUS; SUBCUTANEOUS at 00:03

## 2020-11-25 RX ADMIN — SODIUM CHLORIDE, PRESERVATIVE FREE 10 ML: 5 INJECTION INTRAVENOUS at 08:06

## 2020-11-25 RX ADMIN — METOCLOPRAMIDE 5 MG: 5 TABLET ORAL at 06:37

## 2020-11-25 RX ADMIN — POTASSIUM CHLORIDE 20 MEQ: 20 TABLET, EXTENDED RELEASE ORAL at 08:13

## 2020-11-25 RX ADMIN — METOCLOPRAMIDE 5 MG: 5 TABLET ORAL at 12:26

## 2020-11-25 RX ADMIN — EPOETIN ALFA-EPBX 2000 UNITS: 2000 INJECTION, SOLUTION INTRAVENOUS; SUBCUTANEOUS at 12:27

## 2020-11-25 RX ADMIN — METOPROLOL TARTRATE 100 MG: 50 TABLET, FILM COATED ORAL at 08:13

## 2020-11-25 RX ADMIN — EPOETIN ALFA-EPBX 3000 UNITS: 3000 INJECTION, SOLUTION INTRAVENOUS; SUBCUTANEOUS at 12:27

## 2020-11-25 RX ADMIN — ACETAMINOPHEN 650 MG: 325 TABLET, FILM COATED ORAL at 04:41

## 2020-11-25 RX ADMIN — METOLAZONE 5 MG: 2.5 TABLET ORAL at 08:17

## 2020-11-25 RX ADMIN — INSULIN HUMAN 3 UNITS: 100 INJECTION, SOLUTION PARENTERAL at 08:11

## 2020-11-25 RX ADMIN — HEPARIN SODIUM 5000 UNITS: 5000 INJECTION INTRAVENOUS; SUBCUTANEOUS at 06:38

## 2020-11-25 RX ADMIN — INSULIN GLARGINE 4 UNITS: 100 INJECTION, SOLUTION SUBCUTANEOUS at 08:07

## 2020-11-25 ASSESSMENT — PAIN SCALES - GENERAL
PAINLEVEL_OUTOF10: 0

## 2020-11-25 NOTE — PROGRESS NOTES
Patient asking to leave AMA. Explained to patient that blood work that was recently drawn shows that blood sugar is extremely high. Glucose on lab draw was 618. Told patient that with a blood sugar that high, she was at great risk for going into DKA, going into a diabetic coma, and dying. Patient states that she still wants to leave and that she will take insulin at home. Patient signed herself out. Made sure patient was aware that it was against medical advice. Dr. Yulisa Armijo aware that patient is leaving AMA.

## 2020-11-25 NOTE — PROGRESS NOTES
Called lab again to have someone come draw patient's 0600 labs. They state they will let the phlebotomist who just got here know to come draw her.

## 2020-11-25 NOTE — PLAN OF CARE
Problem: Falls - Risk of:  Goal: Will remain free from falls  Description: Will remain free from falls  11/25/2020 1118 by Bruno Medina RN  Outcome: Met This Shift     Problem: Falls - Risk of:  Goal: Absence of physical injury  Description: Absence of physical injury  11/25/2020 1118 by Bruno Medina RN  Outcome: Met This Shift     Problem: Skin Integrity:  Goal: Will show no infection signs and symptoms  Description: Will show no infection signs and symptoms  11/25/2020 1118 by Bruno Medina RN  Outcome: Met This Shift     Problem: Skin Integrity:  Goal: Absence of new skin breakdown  Description: Absence of new skin breakdown  11/25/2020 1118 by Bruno Medina RN  Outcome: Met This Shift     Problem: Serum Glucose Level - Abnormal:  Goal: Ability to maintain appropriate glucose levels has stabilized  Description: Ability to maintain appropriate glucose levels has stabilized  11/25/2020 1118 by Bruno Medina RN  Outcome: Met This Shift

## 2020-11-25 NOTE — PROGRESS NOTES
Called lab to see why 0600 labs had not been drawn this morning. They state that they will have someone come up soon to draw patient's labs.

## 2020-11-25 NOTE — PLAN OF CARE
Problem: Falls - Risk of:  Goal: Will remain free from falls  Description: Will remain free from falls  Outcome: Met This Shift  Goal: Absence of physical injury  Description: Absence of physical injury  Outcome: Met This Shift     Problem: Skin Integrity:  Goal: Will show no infection signs and symptoms  Description: Will show no infection signs and symptoms  Outcome: Met This Shift  Goal: Absence of new skin breakdown  Description: Absence of new skin breakdown  Outcome: Met This Shift     Problem: Serum Glucose Level - Abnormal:  Goal: Ability to maintain appropriate glucose levels has stabilized  Description: Ability to maintain appropriate glucose levels has stabilized  Outcome: Met This Shift

## 2020-11-25 NOTE — PROGRESS NOTES
Pharmacy Consultation Note  (Antibiotic Dosing and Monitoring)    Initial consult date:   Consulting physician: Dr Yulisa Armijo  Drug(s): Vancomycin IV  Indication: Sepsis    Ht Readings from Last 1 Encounters:   20 5' 4\" (1.626 m)     Wt Readings from Last 1 Encounters:   20 150 lb 12.8 oz (68.4 kg)       Age/  Gender Actual BW IBW DW  Allergy Information   31 y.o.     female 68.4 kg 54.7 kg 60.18 kg  Cefepime and Toradol [ketorolac tromethamine]                 Date  WBC CCPD Drug/Dose Time   Given Level(s)   (Time) Comments     (#1) 9.9 -- Vancomycin 750 mg IV once <2130>       (#2)           (#3)           (#4)           (#5)           (#6)           (#7)           Estimated Creatinine Clearance: 11 mL/min (A) (based on SCr of 7.5 mg/dL (H)). UOP over the past 24 hours:     No intake or output data in the 24 hours ending 20    Temp max: Temp (24hrs), Av.5 °F (35.8 °C), Min:93.7 °F (34.3 °C), Max:98.8 °F (37.1 °C)      Antibiotic Regimen:  Antibiotic Dose Date Initiated   -- -- --     Cultures:  available culture and sensitivity results were reviewed in EPIC  Cultures sent and are pending. Culture Date Result    Blood cx #1  In process   Blood cx #2  In process   Urine cx  In process     Assessment:  · Consulted by Dr. Yulisa Armijo to dose/monitor vancomycin  · Goal trough level:  15-20 mcg/mL  · Pt is a 30 y/o F admitted for hypoglycemia and possible seizure.  She was hypothermic at presentation to the ED  · Peritoneal dialysis patient; ordered 9 hours daily w/ 2 L exchange    Plan:  · Vancomycin 750 mg IV once   · Random level tomorrow after CCPD  · Follow renal function  · Pharmacist will follow and monitor/adjust dosing as necessary      Thank you for the consult,    Mile Guillen, PharmD 2020 8:41 PM   [phone]

## 2020-11-25 NOTE — DISCHARGE SUMMARY
[facility] Physician Discharge Summary       [place] MD  [place] [address] [address]  [phone]    In 1 week        Activity level: Resume normal activity    Diet: DIET CARB CONTROL; Carb Control: 4 carb choices (60 gms)/meal; Renal    Labs: Labs per primary care provider and nephrology    Condition at discharge: Stable    Dispo: hospitals [address]        Patient ID:  Karen Giron  [id number]  78 y.o.  1990    Admit date: 11/24/2020    Discharge date and time:  11/25/2020  3:32 PM    Admission Diagnoses: Active Problems:    Poorly controlled type 1 diabetes mellitus (HCC)    MTHFR mutation (HCC)    Hypoglycemia    Hypertension    Iron deficiency anemia    ESRD on peritoneal dialysis (HCC)    Hypothyroidism    Seizure (HCC)    Hypothermia  Resolved Problems:    * No resolved hospital problems. *      Discharge Diagnoses: Active Problems:    Poorly controlled type 1 diabetes mellitus (HCC)    MTHFR mutation (HCC)    Hypoglycemia    Hypertension    Iron deficiency anemia    ESRD on peritoneal dialysis (HCC)    Hypothyroidism    Seizure (HCC)    Hypothermia  Resolved Problems:    * No resolved hospital problems. 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 She was admitted after an episode of hypoglycemia at home, as well as with hypothermia, possibly due to taking too much insulin when she was hyperglycemic. Patient was started on D5 0.45% sodium chloride overnight, and her blood glucose improved. The next morning she was given subcutaneous Lantus as well as insulin regular.   She has very labile blood sugars, and became hyperglycemic. Blood glucose was reading high on meter, greater than 600 on CMP, however patient did not have a high anion gap and bicarbonate was normal on CMP. I discussed with the patient staying to assess her blood glucose control, as well as awaiting blood culture results as the patient was hypothermic on admission and did miss doses of antibiotics for her infective endocarditis. Patient decided to sign out [address] Discharge Exam:  Vitals:    11/25/20 0445 11/25/20 0633 11/25/20 0809 11/25/20 0856   BP:  (!) 170/80 (!) 143/69 (!) 140/72   Pulse:  92 100 101   Resp:   16 18   Temp:  99.6 °F (37.6 °C) 99.8 °F (37.7 °C) 99 °F (37.2 °C)   TempSrc:  Oral Oral    SpO2:   97%    Weight: 140 lb (63.5 kg)      Height:         General Appearance: alert and oriented to person, place and time and in no acute distress  Skin: warm and dry  Head: normocephalic and atraumatic  Eyes: pupils equal, round, and reactive to light, extraocular eye movements intact, conjunctivae normal  Neck: neck supple and non tender without mass   Pulmonary/Chest: clear to auscultation bilaterally- no wheezes, rales or rhonchi, normal air movement, no respiratory distress  Cardiovascular: normal rate, normal S1 and S2 and no carotid bruits  Abdomen: soft, non-tender, non-distended, normal bowel sounds, no masses or organomegaly. Peritoneal dialysis catheter in place without surrounding erythema or drainage  Extremities: no cyanosis, no clubbing and no edema  Neurologic: no cranial nerve deficit and speech normal  I/O last 3 completed shifts: In: 697.2 [I.V.:447.2; IV Piggyback:250]  Out: 1330 [Urine:500; Other:830]  No intake/output data recorded.       LABS:  Recent Labs     11/24/20  1019 11/24/20  1310 11/25/20  1426   NA  --  140 132   K  --  3.5 4.9   CL  --  102 95*   CO2  --  22 22   BUN  --  50* 47*   CREATININE  --  7.5* 7.2*   GLUCOSE 121 110* 618*   CALCIUM  --  7.8* 8.0* Recent Labs     11/24/20  1035 11/25/20  1426   WBC 9.9 7.3   RBC 3.22* 3.22*   HGB 9.4* 9.6*   HCT 29.1* 29.1*   MCV 90.4 90.4   MCH 29.2 29.8   MCHC 32.3 33.0   RDW 14.8 15.1*   * 186   MPV 10.5 11.7       No results for input(s): POCGLU in the last 72 hours. CBC:   Lab Results   Component Value Date    WBC 7.3 11/25/2020    RBC 3.22 11/25/2020    HGB 9.6 11/25/2020    HCT 29.1 11/25/2020    MCV 90.4 11/25/2020    MCH 29.8 11/25/2020    MCHC 33.0 11/25/2020    RDW 15.1 11/25/2020     11/25/2020    MPV 11.7 11/25/2020     CMP:    Lab Results   Component Value Date     11/25/2020    K 4.9 11/25/2020    K 3.5 11/20/2020    CL 95 11/25/2020    CO2 22 11/25/2020    BUN 47 11/25/2020    CREATININE 7.2 11/25/2020    GFRAA 8 11/25/2020    LABGLOM 8 11/25/2020    GLUCOSE 618 11/25/2020    GLUCOSE 130 05/18/2012    PROT 5.5 11/25/2020    LABALBU 3.3 11/25/2020    LABALBU 4.1 05/18/2012    CALCIUM 8.0 11/25/2020    BILITOT <0.2 11/25/2020    ALKPHOS 115 11/25/2020    AST 68 11/25/2020    ALT 35 11/25/2020       Imaging:  Ct Head Wo Contrast    Result Date: 11/24/2020  EXAMINATION: CT OF THE HEAD WITHOUT CONTRAST  11/24/2020 7:23 am TECHNIQUE: CT of the head was performed without the administration of intravenous contrast. Dose modulation, iterative reconstruction, and/or weight based adjustment of the mA/kV was utilized to reduce the radiation dose to as low as reasonably achievable. COMPARISON: Head CT 11/20/2020. MRI brain 11/20/2020. HISTORY: ORDERING SYSTEM PROVIDED HISTORY: altered TECHNOLOGIST PROVIDED HISTORY: Reason for exam:->altered Has a \"code stroke\" or \"stroke alert\" been called? ->No FINDINGS: BRAIN/VENTRICLES: There is no acute intracranial hemorrhage, mass effect or midline shift. No abnormal extra-axial fluid collection. The gray-white differentiation is maintained without evidence of an acute infarct. There is no evidence of hydrocephalus.  Mild burden of patchy white matter hypoattenuation is similar to the previous exams. ORBITS: The visualized portion of the orbits demonstrate no acute abnormality. SINUSES: The visualized paranasal sinuses and mastoid air cells demonstrate no acute abnormality. SOFT TISSUES/SKULL:  No acute abnormality of the visualized skull or soft tissues. 1. No acute intracranial abnormality. 2. Mild burden of patchy white matter hypoattenuation is similar to the previous exams. Please see the prior MRI brain report from 11/20/2020 for further information. Xr Chest Portable    Result Date: 11/24/2020  EXAMINATION: ONE XRAY VIEW OF THE CHEST 11/24/2020 8:43 am COMPARISON: One-view chest x-ray 11/20/2020 HISTORY: ORDERING SYSTEM PROVIDED HISTORY: hypothermia TECHNOLOGIST PROVIDED HISTORY: Reason for exam:->hypothermia FINDINGS: The cardiac silhouette is within normal limits. The mediastinal contours are within normal limits. No consolidations are identified. No significant pleural effusions or pneumothorax. No osseous abnormality is identified. EKG leads overlie the chest.  There is interval removal of the right PICC seen on the previous exam.    No acute cardiopulmonary process is identified.         Patient Instructions:   Current Discharge Medication List      CONTINUE these medications which have NOT CHANGED    Details   !! insulin lispro (HUMALOG) 100 UNIT/ML injection vial Inject 2 Units into the skin 3 times daily (with meals)  Qty: 1 vial, Refills: 3      !! insulin lispro (HUMALOG) 100 UNIT/ML injection vial Inject 0-4 Units into the skin 4 times daily (before meals and nightly) Glucose: Dose:              No Insulin 181-280 1 Unit 281-380 2 Units 381-480 3 Units Over 480 4 Units  Qty: 1 vial, Refills: 0      insulin glargine (LANTUS) 100 UNIT/ML injection vial Inject 6 Units into the skin nightly  Qty: 1 vial, Refills: 3      bumetanide (BUMEX) 1 MG tablet Take 2 mg by mouth daily      metoclopramide (REGLAN) 5 MG tablet Take 5 mg by mouth 4 times daily (before meals and nightly)      metoprolol (LOPRESSOR) 100 MG tablet Take 100 mg by mouth 2 times daily      potassium chloride (KLOR-CON M) 20 MEQ extended release tablet Take 20 mEq by mouth daily      levothyroxine (SYNTHROID) 75 MCG tablet Take 1 tablet by mouth every morning (before breakfast)  Qty: 30 tablet, Refills: 3      metOLazone (ZAROXOLYN) 5 MG tablet Take 5 mg by mouth daily      cloNIDine (CATAPRES) 0.1 MG tablet Take 0.1 mg by mouth 2 times daily as needed for High Blood Pressure      atorvastatin (LIPITOR) 40 MG tablet Take 1 tablet by mouth nightly  Qty: 30 tablet, Refills: 5    Associated Diagnoses: Hyperlipidemia, unspecified hyperlipidemia type      dilTIAZem (CARDIZEM CD) 240 MG extended release capsule Take 1 capsule by mouth daily  Qty: 30 capsule, Refills: 2    Associated Diagnoses: Diabetic autonomic neuropathy associated with type 1 diabetes mellitus ([facility] [address]       ! ! - Potential duplicate medications found. Please discuss with provider. NOTE: This report was transcribed using voice recognition software.  Every effort was made to ensure accuracy; however, inadvertent computerized transcription errors may be present.     Signed:  Electronically signed by Pardeep Jimenez DO on 11/25/2020 at 3:32 PM

## 2020-11-25 NOTE — PROGRESS NOTES
Cycler started at 2000. Cycling as it should, Patient is resting comfortably.  cycler will do 4 exchanges with a final.

## 2020-11-25 NOTE — PROGRESS NOTES
Pharmacy Consultation Note  (Antibiotic Dosing and Monitoring)    Initial consult date:   Consulting physician: Dr Franco Aleman  Drug(s): Vancomycin IV  Indication: Sepsis    Ht Readings from Last 1 Encounters:   20 5' 4\" (1.626 m)     Wt Readings from Last 1 Encounters:   20 140 lb (63.5 kg)       Age/  Gender Actual BW IBW DW  Allergy Information   31 y.o.     female 68.4 kg 54.7 kg 60.18 kg  Cefepime and Toradol [ketorolac tromethamine]                 Date  WBC CCPD Drug/Dose Time   Given Level(s)   (Time) Comments     (#1) 9.9 -- Vancomycin 750 mg IV once        (#2) -- 9:11 Vancomycin 750 mg IV once 0010 Random @ 1300      (#3)           (#4)           (#5)           (#6)           (#7)           Estimated Creatinine Clearance: 10 mL/min (A) (based on SCr of 7.5 mg/dL (H)). UOP over the past 24 hours:       Intake/Output Summary (Last 24 hours) at 2020 1238  Last data filed at 2020 0856  Gross per 24 hour   Intake 697.22 ml   Output 1330 ml   Net -632.78 ml       Temp max: Temp (24hrs), Av.8 °F (37.1 °C), Min:96.4 °F (35.8 °C), Max:100.1 °F (37.8 °C)      Antibiotic Regimen:  Antibiotic Dose Date Initiated   -- -- --     Cultures:  available culture and sensitivity results were reviewed in EPIC  Cultures sent and are pending. Culture Date Result    Blood cx #1  In process   Blood cx #2  In process   Urine cx  NGTD   Respiratory Panel  Negative     Assessment:  · Consulted by Dr. Franco Aleman to dose/monitor vancomycin  · Goal trough level:  15-20 mcg/mL  · Pt is a 32 y/o F admitted for hypoglycemia and possible seizure.  She was hypothermic at presentation to the ED  · Peritoneal dialysis patient; ordered 9 hours daily w/ 2 L exchange    Plan:  · Vancomycin   · Repeat random level tomorrow after CCPD  · Follow renal function  · Pharmacist will follow and monitor/adjust dosing as necessary      Thank you for the consult,    Demetri Peguero, PharmD

## 2020-11-25 NOTE — CARE COORDINATION
11/25/2020 11:21 CM note: NO COVID TESTING. Met with patient at the bedside to discuss transition of care at discharge. Pt resides with her mother, who is a nurse and her 2 children ages 1 and 3. Patient is independent with ADLs and drives. She is on peritoneal dialysis and obtains supplies from [employer]. She is a DM and has what she calls \"an automatic glucometer\"; where she has a device on her arm that she scans to get her glucose level. Pts PCP is Dr Ann Marie Caruso and her pharmacy is [facility] on [email or web address] in [place]. Pt has no hx HHC or ISAIAH. Patient was recently dx with endocarditis and per last SW notes patient was due to get a dose of vancomycin at [facility] infusion center today; per [facility] patient received a dose of vanco @ midnight this morning. Patient wants to discharge today and is talking about signing out Jefferson Meza RN and Dr Magda Olivas aware. Discharge plan is home and pts mom will provide transportation home.  Electronically signed by Nettie Tovar RN on 11/25/2020 at 11:30 AM

## 2020-11-25 NOTE — PROGRESS NOTES
Patient refused to have RN do nasal swab to test for COVID and swabbed self as RN witnessed at bedside. Per patient usually swabs self while here.      Electronically signed by Jude Glass RN on 11/25/2020 at 4:50 AM

## 2020-11-25 NOTE — PROGRESS NOTES
[address] [place], [place] [address]ist   Progress Note    Admitting Date and Time: 11/24/2020  9:57 AM  Admit Dx: Hypoglycemia [E16.2]    Subjective/interval history:    Pt admitted last night with hypoglycemia and seizure at home. Patient's blood sugars are very labile, and she may have taken too much insulin at home. She was on D5 0.45% overnight, blood glucose was 394 this morning, and it was discontinued. She was given Lantus as well as insulin regular, however blood glucose continued to increase. She states that this happens to her on a regular basis at home. She has no symptoms at this time. She had peritoneal dialysis run overnight, and was also given 1 dose of IV vancomycin that she may have missed her last dose for treatment of her previously diagnosed endocarditis. ROS: denies fever, chills, cp, sob, n/v, HA unless stated above.      epoetin nena-epbx  3,000 Units Subcutaneous Once per day on Mon Wed Fri    And    epoetin nena-epbx  2,000 Units Subcutaneous Once per day on Mon Wed Fri    lidocaine 1 % injection  5 mL Intradermal Once    dilTIAZem  240 mg Oral Daily    levothyroxine  75 mcg Oral QAM AC    metoprolol  100 mg Oral BID    potassium chloride  20 mEq Oral Daily    metOLazone  5 mg Oral Daily    metoclopramide  5 mg Oral 4x Daily AC & HS    atorvastatin  40 mg Oral Nightly    bumetanide  2 mg Oral Daily    sodium chloride flush  10 mL Intravenous 2 times per day    heparin (porcine)  5,000 Units Subcutaneous 3 times per day    vancomycin  750 mg Intravenous Once    vancomycin (VANCOCIN) intermittent dosing (placeholder)   Other RX Placeholder     glucose, 15 g, PRN  dextrose, 12.5 g, PRN  glucagon (rDNA), 1 mg, PRN  dextrose, 100 mL/hr, PRN  sodium chloride flush, 10 mL, PRN  acetaminophen, 650 mg, Q6H PRN    Or  acetaminophen, 650 mg, Q6H PRN  polyethylene glycol, 17 g, Daily PRN  prochlorperazine, 10 mg, Q6H PRN  hydrALAZINE, 20 mg, Q6H PRN         Objective:    BP (!) 140/72   Pulse 101   Temp 99 °F (37.2 °C)   Resp 18   Ht 5' 4\" (1.626 m)   Wt 140 lb (63.5 kg)   LMP 06/24/2020   SpO2 97%   Breastfeeding No   BMI 24.03 kg/m²   General Appearance: alert and oriented to person, place and time and in no acute distress  Skin: warm and dry  Head: normocephalic and atraumatic  Eyes: pupils equal, round, and reactive to light, extraocular eye movements intact, conjunctivae normal  Neck: neck supple and non tender without mass   Pulmonary/Chest: clear to auscultation bilaterally- no wheezes, rales or rhonchi, normal air movement, no respiratory distress  Cardiovascular: normal rate, normal S1 and S2 and no carotid bruits  Abdomen: soft, non-tender, non-distended, normal bowel sounds, no masses or organomegaly.   Peritoneal dialysis catheter in place without surrounding erythema or drainage  Extremities: no cyanosis, no clubbing and no edema  Neurologic: no cranial nerve deficit and speech normal      Recent Labs     11/24/20  1019 11/24/20  1310   NA  --  140   K  --  3.5   CL  --  102   CO2  --  22   BUN  --  50*   CREATININE  --  7.5*   GLUCOSE 121 110*   CALCIUM  --  7.8*       Recent Labs     11/24/20  1310   ALKPHOS 107*   PROT 5.0*   LABALBU 3.1*   BILITOT 0.2   AST 39*   ALT 19       Recent Labs     11/24/20  1035   WBC 9.9   RBC 3.22*   HGB 9.4*   HCT 29.1*   MCV 90.4   MCH 29.2   MCHC 32.3   RDW 14.8   *   MPV 10.5       CBC:   Lab Results   Component Value Date    WBC 9.9 11/24/2020    RBC 3.22 11/24/2020    HGB 9.4 11/24/2020    HCT 29.1 11/24/2020    MCV 90.4 11/24/2020    MCH 29.2 11/24/2020    MCHC 32.3 11/24/2020    RDW 14.8 11/24/2020     11/24/2020    MPV 10.5 11/24/2020     CMP:    Lab Results   Component Value Date     11/24/2020    K 3.5 11/24/2020    K 3.5 11/20/2020     11/24/2020    CO2 22 11/24/2020    BUN 50 11/24/2020    CREATININE 7.5 11/24/2020    GFRAA 8 11/24/2020    LABGLOM 8 11/24/2020    GLUCOSE 110 11/24/2020 GLUCOSE 130 05/18/2012    PROT 5.0 11/24/2020    LABALBU 3.1 11/24/2020    LABALBU 4.1 05/18/2012    CALCIUM 7.8 11/24/2020    BILITOT 0.2 11/24/2020    ALKPHOS 107 11/24/2020    AST 39 11/24/2020    ALT 19 11/24/2020        Radiology:   XR CHEST PORTABLE   Final Result   No acute cardiopulmonary process is identified. CT Head WO Contrast   Final Result   1. No acute intracranial abnormality. 2. Mild burden of patchy white matter hypoattenuation is similar to the   previous exams. Please see the prior MRI brain report from 11/20/2020 for   further information. Assessment/Plan:  Active Problems:    Poorly controlled type 1 diabetes mellitus (HCC)    MTHFR mutation (HCC)    Hypoglycemia    Hypertension    Iron deficiency anemia    ESRD on peritoneal dialysis (HCC)    Hypothyroidism    Seizure ([facility] [address]    Hypothermia  Resolved Problems:    * No resolved hospital problems. *      1. Hypoglycemia in the setting of uncontrolled type 1 diabetes mellitus  -This has resolved  -Patient now hyperglycemic, CMP ordered to assess blood glucose, renal function, and to assess if patient is in DKA. She has had episodes of DKA in the past.    2.  Hypothermia  -May have been due to prolonged hypoglycemia versus infection with incompletely treated infective endocarditis  -Received dose of IV vancomycin here  -Pressure 99 this morning, 100 degrees F earlier the morning.  -Respiratory PCR panel negative  -blood culture showed no growth to date  -Patient is known to infectious disease service, consult request placed    3. ESRD on PD  -Neurology following for home peritoneal dialysis regimen  -CMP ordered this morning, pending draw    4. Hypertensive urgency  -Blood pressure now improved at home medications    5. Hypothyroidism  -Continue levothyroxine    6.   Seizure disorder  -Patient states that she has not been taking seizure medication, but her mother would like her to, and neurology did recommend that she start oxcarbazepine on last admission. She is willing to start this when discharged home on this admission. Disposition: Patient did state to the nursing staff earlier today that she may sign out AMA. I discussed with the patient that her blood work needs to be assessed, as her blood glucose is currently greater than 500, she is agreeable to staying at this time. NOTE: This report was transcribed using voice recognition software. Every effort was made to ensure accuracy; however, inadvertent computerized transcription errors may be present.      Electronically signed by [employer] on 11/25/2020 at 12:16 PM

## 2020-11-25 NOTE — CONSULTS
Located within [facility]  Consult Note    [address] [address]  [place], [address]  Phone [phone]   [phone]      Admit Date: 2020  9:57 AM  Pt Name: Heri Taveras  MRN: [id number]  : 1990  Reason for Consult:  Bacteremia   Chief Complaint   Patient presents with    Hypoglycemia     Requesting Physician:  Sheryl Spears DO  PCP: Edwar Tian MD  History Obtained From:  Patient  ID consulted for Hypoglycemia [E16.2]  on hospital day 0   Face to face encounter   [facility]       Chief Complaint   Patient presents with    Hypoglycemia   no acute distress-   + diarrhea     HISTORYOF PRESENT ILLNESS      Heri Taveras is a 31 y.o. female who presents with significant past medical history of  has a past medical history of Acute congestive heart failure ([address] BC (acute kidney injury) ([address] Cephalgia, Chronic kidney disease, Depression, Diabetes mellitus ([address] Diabetic ketoacidosis ([address] Hemodialysis patient ([address] History of blood transfusion, Hyperlipidemia, Hypothyroidism, Iron deficiency anemia, MDRO (multiple drug resistant organisms) resistance, MRSA (methicillin resistant Staphylococcus aureus), Non compliance w medication regimen, Other disorders of kidney and ureter, Pregnancy, Seizure ([address] and Severe pre-eclampsia in third trimester. who presents with   Chief Complaint   Patient presents with    Hypoglycemia     ED TRIAGEVITALS  BP: (!) 140/72, Temp: 99 °F (37.2 °C), Pulse: 101, Resp: 18, SpO2: 97 %  HPI  She presented to ER with seizures low blood sugar and hypothermia. Patient had a arceo placed- and a bear hugger was applied- she received a dose of vancomycin today - she is currently on the toilet-she wants to go home. She denies any current fevers. She is upset. Labs and cultures reviewed- blood cultures pending  Her COVID was negative. Her blood sugars are being monitored. She is having diarrhea.      She was seen recently in [facility]. [facility] main by Dr. Miguel Angeles- she was continued on Vancomycin for another 2 weeks- to finish out her 6 week course. Patient is known to ID service: Historically: This is a 31 hrs old female with hx of ESRD on PD -developed Staph epidermidis bacteremia ( on 10/8 ) - she was seen by Dr Magaly Saleem [facility]. YULISSA showed large irregular, 2cm x 1cm, mobile, echogenic mass attached to the right atrial side of interatrial septum near SVC opening suggestive of  vegetation or thrombus.  Large, 1cm x 1cm, irregular echogenic mass attached to the tip of the central venous catheter suggestive of vegetation or thrombus . She was transferred to [facility] . Apparently , she left [facility] AMA .      REVIEW OF SYSTEMS    (2-9 systems for level 4, 10 or more for level 5)     REVIEW OF SYSTEMS:    CONSTITUTIONAL:   No fever, chills, weight loss  ALLERGIES:    No urticaria, hay fever,    EYES:     No blurry vision, loss of vision,eye pain  ENT:      No hearing loss, sore throat  CARDIOVASCULAR:   No chest pain or palpitations  RESPIRATORY:   No cough, sob  ENDOCRINE:    No increase thirst, urination   HEME-LYMPH:   No easy bruising or bleeding  GI:     No nausea, vomiting or diarrhea  :     No urinary complaints, ++ PD  NEURO:    No seizures, stroke, HA  MUSCULOSKELETAL:  No muscle aches or pain, no joint pain  SKIN:     No rash or itch  PSYCH:    ++ depression or anxiety    CURRENT MEDICATIONS     Current Facility-Administered Medications:     epoetin nena-epbx (RETACRIT) injection 3,000 Units, 3,000 Units, Subcutaneous, Once per day on Mon Wed Fri, 3,000 Units at 11/25/20 1227 **AND** epoetin nena-epbx (RETACRIT) injection 2,000 Units, 2,000 Units, Subcutaneous, Once per day on Mon Wed Fri, Owen Kovacs MD, 2,000 Units at 11/25/20 1227    lidocaine PF 1 % injection 5 mL, 5 mL, Intradermal, Once, HEBER Ya - CNP    dilTIAZem (CARDIZEM CD) extended release capsule 240 mg, 240 mg, Oral, Daily, Carla Disla Brookings Frankel - CNP, 240 mg at 11/25/20 0813    levothyroxine (SYNTHROID) tablet 75 mcg, 75 mcg, Oral, QAM AC, Margretta Javier, APRN - CNP, 75 mcg at 11/25/20 5616    metoprolol tartrate (LOPRESSOR) tablet 100 mg, 100 mg, Oral, BID, Margretta Javier, APRN - CNP, 100 mg at 11/25/20 0813    potassium chloride (KLOR-CON M) extended release tablet 20 mEq, 20 mEq, Oral, Daily, Margretta Javier, APRN - CNP, 20 mEq at 11/25/20 0813    metOLazone (ZAROXOLYN) tablet 5 mg, 5 mg, Oral, Daily, Margretta Javier, APRN - CNP, 5 mg at 11/25/20 4897    metoclopramide (REGLAN) tablet 5 mg, 5 mg, Oral, 4x Daily AC & HS, Margretta Javier, APRN - CNP, 5 mg at 11/25/20 1226    atorvastatin (LIPITOR) tablet 40 mg, 40 mg, Oral, Nightly, Margretta Javier, APRN - CNP, 40 mg at 11/24/20 2045    bumetanide (BUMEX) tablet 2 mg, 2 mg, Oral, Daily, Margretta Javier, APRN - CNP, 2 mg at 11/25/20 0813    glucose (GLUTOSE) 40 % oral gel 15 g, 15 g, Oral, PRN, Margretta Javier, APRN - CNP    dextrose 50 % IV solution, 12.5 g, Intravenous, PRN, Margretta Javier, APRN - CNP    glucagon (rDNA) injection 1 mg, 1 mg, Intramuscular, PRN, Margretta Javier, APRN - CNP    dextrose 5 % solution, 100 mL/hr, Intravenous, PRN, Margretta Javier, APRN - CNP    sodium chloride flush 0.9 % injection 10 mL, 10 mL, Intravenous, 2 times per day, Margretta Javier, APRN - CNP, 10 mL at 11/25/20 0806    sodium chloride flush 0.9 % injection 10 mL, 10 mL, Intravenous, PRN, Margretta Javier, APRN - CNP, 10 mL at 11/24/20 1852    acetaminophen (TYLENOL) tablet 650 mg, 650 mg, Oral, Q6H PRN, 650 mg at 11/25/20 0441 **OR** acetaminophen (TYLENOL) suppository 650 mg, 650 mg, Rectal, Q6H PRN, Deshawn Herrera APRN - CNP    polyethylene glycol (GLYCOLAX) packet 17 g, 17 g, Oral, Daily PRN, Deshawn Herrera APRN - CNP    prochlorperazine (COMPAZINE) injection 10 mg, 10 mg, Intravenous, Q6H PRN, Deshawn Herrera APRN - CNP    hydrALAZINE (APRESOLINE) injection 20 SJWZ OR    COLONOSCOPY N/A 6/25/2018    COLONOSCOPY WITH BIOPSY performed by Emelyn Bauer MD at [address] COLONOSCOPY N/A 12/18/2018    COLONOSCOPY WITH BIOPSY performed by Mary Sheth MD at [address]  1/31/2012    EF 57%    ECHO COMPL W DOP COLOR FLOW  6/10/2013         EMBOLECTOMY N/A 11/6/2020    Harlo Tutu, YULISSA -- REQS ROOM 3 performed by Dillan Gray MD at [facility] N/A 6/26/2020    LAPAROSCOPIC INSERTION PERITONEAL DIALYSIS CATHETER performed by Lilliana Murrieta MD at [facility] ESOPHAGOGASTRODUODENOSCOPY TRANSORAL DIAGNOSTIC N/A 5/30/2018    EGD ESOPHAGOGASTRODUODENOSCOPY performed by Emelyn Bauer MD at [address] [facility] TRANSESOPHAGEAL ECHOCARDIOGRAM N/A 10/19/2020    TRANSESOPHAGEAL ECHOCARDIOGRAM WITH BUBBLE STUDY performed by Malcolm Damon MD at [address] [facility] TUNNELED VENOUS PORT PLACEMENT  04/2018    UPPER GASTROINTESTINAL ENDOSCOPY  12/18/2018    EGD BIOPSY performed by Mary Sheth MD at [facility] N/A 10/11/2019    EGD ESOPHAGOGASTRODUODENOSCOPY performed by Amalia Mccallum DO at [address] History   Problem Relation Age of Onset    Asthma Mother     Hypertension Mother     High Blood Pressure Mother     Diabetes Mother     Asthma Brother     High Blood Pressure Father      SOCIAL HISTORY       Social History     Socioeconomic History    Marital status: Single     Spouse name: None    Number of children: 2    Years of education: None    Highest education level: None   Occupational History    None   Social Needs    Financial resource strain: Very hard    Food insecurity     Worry: Never true     Inability: Never true    Transportation needs     Medical: No     Non-medical: No   Tobacco Use    Smoking status: Current Every Day Smoker     Packs/day: 0.50     Years: 6.00     Pack years: 3.00     Types: Cigarettes    Smokeless tobacco: Current User   Substance and Sexual Activity    Alcohol use: No    Drug use: No     Comment: documented prior history of opioid abuse    Sexual activity: Yes     Partners: Male   Lifestyle    Physical activity     Days per week: None     Minutes per session: None    Stress: None   Relationships    Social connections     Talks on phone: None     Gets together: None     Attends [employer] service: None     Active member of club or organization: None     Attends meetings of clubs or organizations: None     Relationship status: None    Intimate partner violence     Fear of current or ex partner: None     Emotionally abused: None     Physically abused: None     Forced sexual activity: None   Other Topics Concern    None   Social History Narrative    None   · lives with mom   · Has 2 children     PHYSICAL EXAM    (up to 7 for level 4, 8 or more forlevel 5)     ED Triage Vitals   BP Temp Temp Source Pulse Resp SpO2 Height Weight   11/24/20 1019 11/24/20 1019 11/24/20 1019 11/24/20 1019 11/24/20 1019 11/24/20 1019 11/24/20 1800 11/25/20 0445   (!) 198/156 93.7 °F (34.3 °C) Rectal 83 20 99 % 5' 4\" (1.626 m) 140 lb (63.5 kg)     Vitals:    Vitals:    11/25/20 0445 11/25/20 0633 11/25/20 0809 11/25/20 0856   BP:  (!) 170/80 (!) 143/69 (!) 140/72   Pulse:  92 100 101   Resp:   16 18   Temp:  99.6 °F (37.6 °C) 99.8 °F (37.7 °C) 99 °F (37.2 °C)   TempSrc:  Oral Oral    SpO2:   97%    Weight: 140 lb (63.5 kg)      Height:         Physical Exam   Constitutional/General: Alert and oriented, NAD- sitting up on toilet , having diarrhea. Head: NC/AT  Eyes: PERRL, EOMI  Mouth: Normal mucosa, no thrush   Neck: Supple, full ROM,    Pulmonary: Lungs clear to auscultation bilaterally. Not in respiratory distress  Cardiovascular:  Regular rate and rhythm  Abdomen: Soft, + BS. No distension. Nontender. PD cath site dressed  Extremities: Moves all extremities x 4.    Warm and well perfused  Skin: Warm and dry without rash, + Tattoos   Neurologic:    No focal deficits  Psych: Normal Affect  Has arceo cath- yellow urine     DIAGNOSTICRESULTS   RADIOLOGY:   Ct Head Wo Contrast    Result Date: 11/24/2020  EXAMINATION: CT OF THE HEAD WITHOUT CONTRAST  11/24/2020 7:23 am TECHNIQUE: CT of the head was performed without the administration of intravenous contrast. Dose modulation, iterative reconstruction, and/or weight based adjustment of the mA/kV was utilized to reduce the radiation dose to as low as reasonably achievable. COMPARISON: Head CT 11/20/2020. MRI brain 11/20/2020. HISTORY: ORDERING SYSTEM PROVIDED HISTORY: altered TECHNOLOGIST PROVIDED HISTORY: Reason for exam:->altered Has a \"code stroke\" or \"stroke alert\" been called? ->No FINDINGS: BRAIN/VENTRICLES: There is no acute intracranial hemorrhage, mass effect or midline shift. No abnormal extra-axial fluid collection. The gray-white differentiation is maintained without evidence of an acute infarct. There is no evidence of hydrocephalus. Mild burden of patchy white matter hypoattenuation is similar to the previous exams. ORBITS: The visualized portion of the orbits demonstrate no acute abnormality. SINUSES: The visualized paranasal sinuses and mastoid air cells demonstrate no acute abnormality. SOFT TISSUES/SKULL:  No acute abnormality of the visualized skull or soft tissues. 1. No acute intracranial abnormality. 2. Mild burden of patchy white matter hypoattenuation is similar to the previous exams. Please see the prior MRI brain report from 11/20/2020 for further information.     Ct Head Wo Contrast    Result Date: 11/20/2020  EXAMINATION: CT OF THE HEAD WITHOUT CONTRAST  11/20/2020 8:07 am TECHNIQUE: CT of the head was performed without the administration of intravenous contrast. Dose modulation, iterative reconstruction, and/or weight based adjustment of the mA/kV was utilized to reduce the radiation dose to as low as reasonably achievable. COMPARISON: 10/20/2020 HISTORY: ORDERING SYSTEM PROVIDED HISTORY: seizure TECHNOLOGIST PROVIDED HISTORY: Reason for exam:->seizure Has a \"code stroke\" or \"stroke alert\" been called? ->No FINDINGS: BRAIN/VENTRICLES: There is no acute intracranial hemorrhage, mass effect or midline shift. No abnormal extra-axial fluid collection. The gray-white differentiation is maintained without evidence of an acute infarct. There is no evidence of hydrocephalus. ORBITS: The visualized portion of the orbits demonstrate no acute abnormality. SINUSES: The visualized paranasal sinuses and mastoid air cells demonstrate no acute abnormality. SOFT TISSUES/SKULL:  No acute abnormality of the visualized skull or soft tissues. No acute intracranial abnormality. Xr Chest Portable    Result Date: 11/24/2020  EXAMINATION: ONE XRAY VIEW OF THE CHEST 11/24/2020 8:43 am COMPARISON: One-view chest x-ray 11/20/2020 HISTORY: ORDERING SYSTEM PROVIDED HISTORY: hypothermia TECHNOLOGIST PROVIDED HISTORY: Reason for exam:->hypothermia FINDINGS: The cardiac silhouette is within normal limits. The mediastinal contours are within normal limits. No consolidations are identified. No significant pleural effusions or pneumothorax. No osseous abnormality is identified. EKG leads overlie the chest.  There is interval removal of the right PICC seen on the previous exam.    No acute cardiopulmonary process is identified. Xr Chest Portable    Result Date: 11/20/2020  EXAMINATION: ONE XRAY VIEW OF THE CHEST 11/20/2020 2:49 pm COMPARISON: November 6, 2020 HISTORY: ORDERING SYSTEM PROVIDED HISTORY: PICC line placement TECHNOLOGIST PROVIDED HISTORY: Reason for exam:->PICC line placement FINDINGS: Right-sided PICC line catheter has been inserted with the tip at the cavoatrial junction. No pneumothorax. The lungs are clear. The cardiac silhouette is within normal limits. The costophrenic angles are clear.     No pneumothorax, post right-sided PICC line catheter placement. Xr Chest Portable    Result Date: 11/6/2020  EXAMINATION: ONE XRAY VIEW OF THE CHEST 11/6/2020 2:43 pm COMPARISON: 10/31/2020 HISTORY: ORDERING SYSTEM PROVIDED HISTORY: evaluate lung fields, tube placement TECHNOLOGIST PROVIDED HISTORY: Reason for exam:->evaluate lung fields, tube placement What reading provider will be dictating this exam?->CRC FINDINGS: Cardiac silhouette size slightly enlarged. No evidence of vascular congestion. No radiographically visible pneumothorax or pleural effusion. No acute displaced fracture. Upper abdominal surgical clips are suggestive of prior cholecystectomy. Linear scar unchanged in right lung apex. Right lung otherwise clear. New linear densities in left lung base may reflect atelectasis versus early infiltrate. No definite new tubing is visualized radiographically. No definite new tubing is visualized radiographically, except for small IV site in right upper arm. New linear densities in lateral left lung base may reflect atelectasis versus or early infiltrate Slight cardiomegaly without vascular congestion     Xr Chest Portable    Result Date: 10/31/2020  EXAMINATION: ONE XRAY VIEW OF THE CHEST 10/31/2020 3:02 am COMPARISON: Chest x-ray from 10/13/2020 HISTORY: ORDERING SYSTEM PROVIDED HISTORY: Possible sepsis TECHNOLOGIST PROVIDED HISTORY: Reason for exam:->Possible sepsis What reading provider will be dictating this exam?->CRC FINDINGS: Mild areas of patchy opacities are noted in the bilateral lower lungs which could represent atelectasis versus mild/early airspace disease process. Mild cardiomegaly. No evidence of pleural effusion. Visualized osseous structures are unremarkable. Mild areas of patchy opacities in the bilateral lower lungs which could represent atelectasis versus airspace disease process. Mild cardiomegaly.      Mri Brain Wo Contrast    Result Date: 11/20/2020  EXAMINATION: MRI OF THE BRAIN WITHOUT CONTRAST 11/20/2020 7:45 am TECHNIQUE: Multiplanar multisequence MRI of the brain was performed without the administration of intravenous contrast. COMPARISON: MR brain October 23, 2020. HISTORY: ORDERING SYSTEM PROVIDED HISTORY: recent endocarditis. has seizure TECHNOLOGIST PROVIDED HISTORY: Reason for exam:->recent endocarditis. has seizure FINDINGS: The ventricular, sulcal, and cisternal spaces are age-appropriate. The pituitary gland is normal in size. The cerebellar tonsils are normal in position. The vascular arterial flow voids at the skull base appears patent. There is been interval resolution of relatively symmetric linear restricted diffusion within the bilateral frontal parietal deep white matter. There is been interval development of patchy FLAIR hyperintensities within the periventricular, deep, and subcortical white matter, left greater than right. There is no restricted diffusion to suggest an acute infarct. There is no evidence for discrete mass lesion, extra-axial fluid collection, or midline shift. The orbital globes and retrobulbar structures appear grossly unremarkable. There is mild diffuse mucosal thickening of the bilateral maxillary, right sphenoid, and bilateral ethmoid sinuses. The mastoid air cells are well aerated. Interval resolution of relatively symmetric linear restricted diffusion within the bilateral frontal parietal deep white matter with interval development patchy FLAIR hyperintensities within the periventricular, deep, and subcortical white matter, left greater than right. Findings are nonspecific with primary diagnostic consideration given to infectious versus metabolic encephalopathy. Correlation with CSF analysis may be of benefit.     LABS  Recent Labs     11/24/20  1035   WBC 9.9   HGB 9.4*   HCT 29.1*   MCV 90.4   *     Recent Labs     11/24/20  1019 11/24/20  1310   NA  --  140   K  --  3.5   CL  --  102   CO2  --  22   BUN  --  50*   CREATININE  --  7.5*   GFRAA for: [facility]      Component Value Date/Time    FUNGSM No Fungal elements seen 11/06/2020 1142    LABFUNG No growth after 1 week/s of incubation. 11/06/2020 1142     No results found for: CULTRESP  No results found for: CXCATHTIP  Body Fluid Culture, Sterile   Date Value Ref Range Status   10/20/2020   Final    Neisseria gonorrhoeae not isolated  Growth not present       Culture Surgical   Date Value Ref Range Status   11/06/2020 Growth not present  Final     Urine Culture, Routine   Date Value Ref Range Status   11/24/2020 Growth not present, incubation continues  Preliminary   10/08/2020 50,000 CFU/ml  Final   08/26/2020 <10,000 CFU/mL  Mixed gram positive organisms    Final     MRSA Culture Only   Date Value Ref Range Status   10/13/2020 Methicillin resistant Staph aureus not isolated  Final   10/30/2019 Methicillin resistant Staph aureus not isolated  Final   10/02/2019 Methicillin resistant Staph aureus not isolated  Final     Patient is a 31 y.o. female who presented with   Chief Complaint   Patient presents with    Hypoglycemia      FINAL IMPRESSION      1. Hypoglycemia    2. 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 - had YULISSA  · S/p removal of mediport on 11/6/2020   · Fevers   · Diarrhea - has history of c.diff- 10/25/2020    Plan:   · Had a dose of vancomycin today-   · Will check random level in am  · Check c.diff   · Repeat blood culture pending   · Had discussion with patient- blood cultures pending- patient did have fevers- would not recommend leaving today- await culture results   · Patient reports she does not want to stay and will leave AMA  · Discussed risks with patient about leaving [facility]--- she reports she wants to go home  · Discussed with PCP    Imaging and labs were reviewed per medical records and any ID pertinent labs were addressed with the patient.      The patient/FAMILY  was educated about the diagnosis, prognosis, indications, risks and benefits of treatment. An opportunity to ask questions was given to the patient/FAMILY and questions were answered. Thank you for involving me in the care of [address] [place] [address]. Please do not hesitate to call for any questions or concerns.      Electronically signed by HEBER Griffin on 11/25/2020 at 2:21 PM    Came to see pt  Informed by staff that she left [address] [place] [address]

## 2020-11-26 LAB
BLOOD CULTURE, ROUTINE: NORMAL
CULTURE, BLOOD 2: NORMAL
URINE CULTURE, ROUTINE: NORMAL

## 2020-11-29 LAB
BLOOD CULTURE, ROUTINE: NORMAL
CULTURE, BLOOD 2: NORMAL

## 2020-12-13 ENCOUNTER — HOSPITAL ENCOUNTER (OUTPATIENT)
Age: 30
Setting detail: OBSERVATION
Discharge: LEFT BEFORE DISCHARGE | End: 2020-12-13
Attending: EMERGENCY MEDICINE
Payer: COMMERCIAL

## 2020-12-13 ENCOUNTER — APPOINTMENT (OUTPATIENT)
Dept: GENERAL RADIOLOGY | Age: 30
End: 2020-12-13
Payer: COMMERCIAL

## 2020-12-13 VITALS
WEIGHT: 140 LBS | BODY MASS INDEX: 23.9 KG/M2 | HEIGHT: 64 IN | SYSTOLIC BLOOD PRESSURE: 137 MMHG | HEART RATE: 60 BPM | DIASTOLIC BLOOD PRESSURE: 83 MMHG | RESPIRATION RATE: 16 BRPM | OXYGEN SATURATION: 100 % | TEMPERATURE: 97.5 F

## 2020-12-13 LAB
ALBUMIN SERPL-MCNC: 2.5 G/DL (ref 3.5–5.2)
ALP BLD-CCNC: 112 U/L (ref 35–104)
ALT SERPL-CCNC: 30 U/L (ref 0–32)
ANION GAP SERPL CALCULATED.3IONS-SCNC: 16 MMOL/L (ref 7–16)
AST SERPL-CCNC: 46 U/L (ref 0–31)
BASOPHILS ABSOLUTE: 0.08 E9/L (ref 0–0.2)
BASOPHILS RELATIVE PERCENT: 1.2 % (ref 0–2)
BILIRUB SERPL-MCNC: <0.2 MG/DL (ref 0–1.2)
BUN BLDV-MCNC: 46 MG/DL (ref 6–20)
CALCIUM SERPL-MCNC: 8.2 MG/DL (ref 8.6–10.2)
CHLORIDE BLD-SCNC: 98 MMOL/L (ref 98–107)
CHP ED QC CHECK: YES
CO2: 21 MMOL/L (ref 22–29)
CREAT SERPL-MCNC: 8.1 MG/DL (ref 0.5–1)
EOSINOPHILS ABSOLUTE: 0.33 E9/L (ref 0.05–0.5)
EOSINOPHILS RELATIVE PERCENT: 4.8 % (ref 0–6)
GFR AFRICAN AMERICAN: 7
GFR NON-AFRICAN AMERICAN: 7 ML/MIN/1.73
GLUCOSE BLD-MCNC: 100 MG/DL (ref 74–99)
GLUCOSE BLD-MCNC: 99 MG/DL
HCT VFR BLD CALC: 36.6 % (ref 34–48)
HEMOGLOBIN: 12.1 G/DL (ref 11.5–15.5)
IMMATURE GRANULOCYTES #: 0.03 E9/L
IMMATURE GRANULOCYTES %: 0.4 % (ref 0–5)
LACTIC ACID: 0.6 MMOL/L (ref 0.5–2.2)
LYMPHOCYTES ABSOLUTE: 1.34 E9/L (ref 1.5–4)
LYMPHOCYTES RELATIVE PERCENT: 19.5 % (ref 20–42)
MCH RBC QN AUTO: 29.4 PG (ref 26–35)
MCHC RBC AUTO-ENTMCNC: 33.1 % (ref 32–34.5)
MCV RBC AUTO: 88.8 FL (ref 80–99.9)
METER GLUCOSE: 126 MG/DL (ref 74–99)
METER GLUCOSE: 51 MG/DL (ref 74–99)
METER GLUCOSE: 70 MG/DL (ref 74–99)
METER GLUCOSE: 99 MG/DL (ref 74–99)
MONOCYTES ABSOLUTE: 0.29 E9/L (ref 0.1–0.95)
MONOCYTES RELATIVE PERCENT: 4.2 % (ref 2–12)
NEUTROPHILS ABSOLUTE: 4.79 E9/L (ref 1.8–7.3)
NEUTROPHILS RELATIVE PERCENT: 69.9 % (ref 43–80)
PDW BLD-RTO: 15.1 FL (ref 11.5–15)
PLATELET # BLD: 221 E9/L (ref 130–450)
PMV BLD AUTO: 10.1 FL (ref 7–12)
POTASSIUM REFLEX MAGNESIUM: 3.7 MMOL/L (ref 3.5–5)
RBC # BLD: 4.12 E12/L (ref 3.5–5.5)
REASON FOR REJECTION: NORMAL
REJECTED TEST: NORMAL
SODIUM BLD-SCNC: 135 MMOL/L (ref 132–146)
TOTAL PROTEIN: 5.1 G/DL (ref 6.4–8.3)
WBC # BLD: 6.9 E9/L (ref 4.5–11.5)

## 2020-12-13 PROCEDURE — 2580000003 HC RX 258

## 2020-12-13 PROCEDURE — 71045 X-RAY EXAM CHEST 1 VIEW: CPT

## 2020-12-13 PROCEDURE — G0378 HOSPITAL OBSERVATION PER HR: HCPCS

## 2020-12-13 PROCEDURE — 6360000002 HC RX W HCPCS: Performed by: EMERGENCY MEDICINE

## 2020-12-13 PROCEDURE — 96374 THER/PROPH/DIAG INJ IV PUSH: CPT

## 2020-12-13 PROCEDURE — 83605 ASSAY OF LACTIC ACID: CPT

## 2020-12-13 PROCEDURE — 82962 GLUCOSE BLOOD TEST: CPT

## 2020-12-13 PROCEDURE — 80053 COMPREHEN METABOLIC PANEL: CPT

## 2020-12-13 PROCEDURE — 36415 COLL VENOUS BLD VENIPUNCTURE: CPT

## 2020-12-13 PROCEDURE — 2580000003 HC RX 258: Performed by: EMERGENCY MEDICINE

## 2020-12-13 PROCEDURE — 99285 EMERGENCY DEPT VISIT HI MDM: CPT

## 2020-12-13 PROCEDURE — 85025 COMPLETE CBC W/AUTO DIFF WBC: CPT

## 2020-12-13 RX ORDER — DEXTROSE MONOHYDRATE 25 G/50ML
25 INJECTION, SOLUTION INTRAVENOUS ONCE
Status: COMPLETED | OUTPATIENT
Start: 2020-12-13 | End: 2020-12-13

## 2020-12-13 RX ORDER — FENTANYL CITRATE 50 UG/ML
50 INJECTION, SOLUTION INTRAMUSCULAR; INTRAVENOUS ONCE
Status: COMPLETED | OUTPATIENT
Start: 2020-12-13 | End: 2020-12-13

## 2020-12-13 RX ORDER — DEXTROSE MONOHYDRATE 25 G/50ML
INJECTION, SOLUTION INTRAVENOUS
Status: COMPLETED
Start: 2020-12-13 | End: 2020-12-13

## 2020-12-13 RX ORDER — DEXTROSE MONOHYDRATE 100 MG/ML
INJECTION, SOLUTION INTRAVENOUS ONCE
Status: COMPLETED | OUTPATIENT
Start: 2020-12-13 | End: 2020-12-13

## 2020-12-13 RX ADMIN — FENTANYL CITRATE 50 MCG: 50 INJECTION INTRAMUSCULAR; INTRAVENOUS at 20:03

## 2020-12-13 RX ADMIN — DEXTROSE MONOHYDRATE: 100 INJECTION, SOLUTION INTRAVENOUS at 22:52

## 2020-12-13 RX ADMIN — DEXTROSE MONOHYDRATE 25 G: 500 INJECTION PARENTERAL at 19:41

## 2020-12-13 RX ADMIN — DEXTROSE MONOHYDRATE 25 G: 25 INJECTION, SOLUTION INTRAVENOUS at 19:41

## 2020-12-13 ASSESSMENT — PAIN SCALES - GENERAL
PAINLEVEL_OUTOF10: 3
PAINLEVEL_OUTOF10: 8
PAINLEVEL_OUTOF10: 3
PAINLEVEL_OUTOF10: 8

## 2020-12-13 ASSESSMENT — PAIN DESCRIPTION - FREQUENCY
FREQUENCY: CONTINUOUS
FREQUENCY: CONTINUOUS

## 2020-12-13 ASSESSMENT — PAIN DESCRIPTION - PAIN TYPE
TYPE: ACUTE PAIN
TYPE: ACUTE PAIN

## 2020-12-13 ASSESSMENT — PAIN DESCRIPTION - PROGRESSION: CLINICAL_PROGRESSION: GRADUALLY IMPROVING

## 2020-12-13 ASSESSMENT — PAIN DESCRIPTION - LOCATION
LOCATION: ABDOMEN;KNEE
LOCATION: KNEE

## 2020-12-13 ASSESSMENT — PATIENT HEALTH QUESTIONNAIRE - PHQ9: SUM OF ALL RESPONSES TO PHQ QUESTIONS 1-9: 14

## 2020-12-13 ASSESSMENT — PAIN DESCRIPTION - ORIENTATION
ORIENTATION: RIGHT
ORIENTATION: RIGHT

## 2020-12-13 ASSESSMENT — PAIN DESCRIPTION - DESCRIPTORS: DESCRIPTORS: SORE

## 2020-12-13 NOTE — ED PROVIDER NOTES
Patient presents to the ED for evaluation. She was found unresponsive at home. EMS got there and her blood sugar was in the 30s. They gave an IO and administered D10 solution through it. She has had improved mentation. The only thing she is complaining of right now is the pain in her left lower extremity where the IO is. She denies chest pain, shortness breath, fever, chills, nausea, or vomiting. She states that now she is very confused and does not know what is going on. She does not remember if she ate anything today. She does not remember if she took her insulin today. She cannot provide much history at this point. Review of Systems   Unable to perform ROS: Mental status change   Endocrine: Negative for heat intolerance. Physical Exam  Vitals signs and nursing note reviewed. Constitutional:       General: She is not in acute distress. Appearance: She is well-developed and normal weight. She is not toxic-appearing or diaphoretic. Comments: Patient appears uncomfortable but is in no distress   HENT:      Head: Normocephalic and atraumatic. Eyes:      Conjunctiva/sclera: Conjunctivae normal.   Neck:      Musculoskeletal: Normal range of motion and neck supple. Cardiovascular:      Rate and Rhythm: Normal rate and regular rhythm. Heart sounds: Normal heart sounds. No murmur. Pulmonary:      Effort: Pulmonary effort is normal. No respiratory distress. Breath sounds: Normal breath sounds. No wheezing or rales. Abdominal:      General: Bowel sounds are normal. There is no distension. Palpations: Abdomen is soft. Tenderness: There is no abdominal tenderness. There is no guarding or rebound. Musculoskeletal:      Comments: Patient has tenderness in her left lower extremity below the knee. There is an IO in place. No surrounding edema to suggest infiltration. Skin:     General: Skin is warm and dry.    Neurological:      Mental Status: She is alert

## 2020-12-13 NOTE — Clinical Note
Patient Class: Observation [104]   REQUIRED: Diagnosis: Hypoglycemia [975437]   Estimated Length of Stay: Estimated stay of less than 2 midnights

## 2020-12-13 NOTE — ED NOTES
Bed: 13  Expected date:   Expected time:   Means of arrival:   Comments:  LANES - Hypoglycemia     Nazario Pisano RN  12/13/20 7491

## 2020-12-14 LAB
FUNGUS (MYCOLOGY) CULTURE: NORMAL
FUNGUS STAIN: NORMAL

## 2020-12-14 RX ORDER — PERPHENAZINE 16 MG/1
1 TABLET, FILM COATED ORAL
Qty: 150 EACH | Refills: 5 | Status: SHIPPED
Start: 2020-12-14 | End: 2021-09-28 | Stop reason: ALTCHOICE

## 2020-12-14 NOTE — ED PROVIDER NOTES
Assumed care of patient from Dr. Ivan Hawk. Reviewed lab work-up and vital signs. On my evaluation patient resting comfortably in bed, eating a snack, in no acute distress. She says that she has been taking her Lantus in the morning and at night as usual, Humalog with meals. Does peritoneal dialysis every night. Makes minimal urine output at baseline. Labs are stable from prior baseline values, low albumin consistent with malnutrition, kidney function consistent with end-stage renal disease. Monitored for several hours, repeat blood sugar after receiving D50 earlier and after adequate p.o. intake is 51. Started D10 infusion. Admitted for BG monitoring and further management by internal medicine team. Discussed findings and expected course of care and patient/surrogate agreed with plan for admission for further evaluation and management.     Labs Reviewed   CBC WITH AUTO DIFFERENTIAL - Abnormal; Notable for the following components:       Result Value    RDW 15.1 (*)     Lymphocytes % 19.5 (*)     Lymphocytes Absolute 1.34 (*)     All other components within normal limits   COMPREHENSIVE METABOLIC PANEL W/ REFLEX TO MG FOR LOW K - Abnormal; Notable for the following components:    CO2 21 (*)     Glucose 100 (*)     BUN 46 (*)     CREATININE 8.1 (*)     Calcium 8.2 (*)     Total Protein 5.1 (*)     Alb 2.5 (*)     Alkaline Phosphatase 112 (*)     AST 46 (*)     All other components within normal limits    Narrative:     Gely Garrett tel. [phone],  Chemistry results called to and read back by MARCIANO GARCIA/CHRISTELLE [facility] RN, 12/13/2020  21:59, by [facility]  Chemistry results called to and read back by Kristie Scott RN, 12/13/2020  21:57, by [facility]   POCT GLUCOSE - Abnormal; Notable for the following components:    Meter Glucose 70 (*)     All other components within normal limits   POCT GLUCOSE - Abnormal; Notable for the following components:    Meter Glucose 126 (*)     All other components within normal limits POCT GLUCOSE - Normal   SPECIMEN REJECTION    Narrative:     Ana Laura Chou tel. [phone],  Rejected Test Name/Called to: [facility], 12/13/2020 19:54, by Estelle Mattson CANCELLED 12/13/2020 19:52, Rejected: Clotted Rejected: Hemolyzed. LACTIC ACID, PLASMA   URINALYSIS WITH MICROSCOPIC   POCT GLUCOSE       MD Mai Burroughs MD  12/13/20 5899    ADDENDUM:    On reevaluation patient reports that she wants to leave [address] She is alert, appropriately oriented, and has capacity for medical decision-making. She was explained the risks of leaving AMA including death and permanent disability. She understands these risk, verbalize them back to me, and still wants to leave [address] She is discharged AMA.     MD Mai Burroughs MD  12/13/20 5819

## 2020-12-14 NOTE — ED NOTES
Pt only able to pee once a day. Pt not able to produce urine at this time.  MD notified      Damon Forrester RN  12/13/20 2598

## 2020-12-14 NOTE — PROGRESS NOTES
[facility]  Internal Medicine Residency Program  ACC Note      SUBJECTIVE:  CC: had concerns including Diabetes, Hypoglycemia, Anorexia (requesting med for appetite), and Leg Pain (left leg). 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 Large 1x1 cm, irregular echogenic mass attached to tip of the cvc suggestive of vegetation or thrombus, supposed to transferred to [facility] but left AMA  Treated with vancomycin, complicated with C diff infection     10/31/2020 admitted to  ->  Repeated YULISSA reports: a right atrial mobile mass approximately 2.4 cm x 0.4 cm attached to the right atrium septum at the junction of the SVC and RA, no evidence of valvular vegetation. She underwent angiovac vegectomy on . Discharged on vancomycin 1g X 2 dose every week    2020 admitted to [address] [place] [address] ?seizure, patient refused anticonvulsant but she supposed to have EEG as outpatient, and found she missed 2 doses of vanco on 18 and 2020 admitted to [address] [place] [address] for hypoglycemia episode, BG down to 40s    2020 admitted to [address] [place] [address] for hypoglycemic episode BG donw to 30s    She establish care with Dr Ivone Murdock for DM management in Nov and planned on obtaining insulin pump soon, pump education started tomorrow.  She got freestyle Isabel but  since just sustain for 2 weeks    Today, she c/o poor appetite, early satiety, BG running low most times, 6-7 times for the past 2 weeks BG less than 60 reported. Also c/o LE numbness and tingling, better with gabapentin in the past, running out of the medication, was previously on 100mg BID    Depressed, lost interested, unable to concentrate, less sleep   Hx of bipolar in 2019, seen at [facility] behavioral health, refuse to go back, however she is willing to see new counselor     Review Of Systems:  General: no fevers, chills, weight loss or gain. Ears/Nose/Throat: no hearing loss, tinnitus, vertigo, nosebleed, nasal congestion, rhinorrhea, sore throat  Respiratory: no cough, pleuritic chest pain, dyspnea, or wheezing  Cardiovascular: no chest pain, angina, dyspnea on exertion, orthopnea, PND, palpitations, or claudication  Gastrointestinal: + nausea, vomiting, heartburn, diarrhea, constipation, abdominal pain, hematochezia or melena  Genitourinary: no urinary urgency, frequency, dysuria, nocturia, hesitancy, or incontinence  Musculoskeletal: no arthritis, arthralgia, + myalgia, weakness, or morning stiffness  Skin: no abnormal pigmentation, rash, itching, masses, hair or nail changes    Outpatient Medications Marked as Taking for the 12/15/20 encounter (Office Visit) with Miladis Gillette MD   Medication Sig Dispense Refill    insulin aspart (NOVOLOG) 100 UNIT/ML injection vial 2 Units 3 times daily (before meals) Plus sliding scale      gabapentin (NEURONTIN) 100 MG capsule Take 2 capsules by mouth 3 times daily for 60 days.  120 capsule 2    dilTIAZem (CARDIZEM CD) 240 MG extended release capsule Take 1 capsule by mouth daily 30 capsule 2    insulin glargine (LANTUS) 100 UNIT/ML injection vial Inject 8 Units into the skin every morning 1 vial 3    mirtazapine (REMERON) 15 MG tablet Take 1 tablet by mouth nightly 30 tablet 2    insulin glargine (LANTUS) 100 UNIT/ML injection vial Inject 6 Units into the skin nightly (Patient taking differently: Inject 6 Units into the skin nightly Taking 10 unit in am and 6 units in pm) 1 vial 3    bumetanide (BUMEX) 1 MG tablet Take 2 mg by mouth daily      metoclopramide (REGLAN) 5 MG tablet Take 5 mg by mouth 4 times daily (before meals and nightly)      metoprolol (LOPRESSOR) 100 MG tablet Take 100 mg by mouth 2 times daily      potassium chloride (KLOR-CON M) 20 MEQ extended release tablet Take 20 mEq by mouth daily      levothyroxine (SYNTHROID) 75 MCG tablet Take 1 tablet by mouth every morning (before breakfast) 30 tablet 3    metOLazone (ZAROXOLYN) 5 MG tablet Take 5 mg by mouth daily      cloNIDine (CATAPRES) 0.1 MG tablet Take 0.1 mg by mouth 2 times daily as needed for High Blood Pressure      atorvastatin (LIPITOR) 40 MG tablet Take 1 tablet by mouth nightly 30 tablet 5       I have reviewed all pertinent PMHx, PSHx, FamHx, SocialHx, medications, and allergies and updated history as appropriate. OBJECTIVE:    VS: BP (!) 158/99   Pulse 87   Temp 97.5 °F (36.4 °C) (Oral)   Resp 12   Ht 5' 4\" (1.626 m)   Wt 140 lb (63.5 kg)   BMI 24.03 kg/m²      General appearance: Alert, Awake, Oriented times 3, no distress  Lungs: Lungs clear to auscultation bilaterally. No rhonchi, crackles or wheezes  Heart: S1 S2  Regular rate and rhythm. No rub, murmur or gallop  Abdomen: Abdomen soft, non-tender. non-distended BS normal. No masses, organomegaly, no guarding rebound or rigidity. Extremities: No edema, Peripheral pulses palpable 2/4    ASSESSMENT/PLAN:  1.  Uncontrolled DM1 with multiple complications including neuropathy, nephropathy and gastroparesis  Will dec morning lantus to 8 U, instructed her to decrease to 6 U if cont to have hypoglycemic episode   Consult about symptoms of hypoglycemia, instructed her to bring juice or gel always incase BG running low  instructed her to call Dr Melo William office to report the hypoglycemia and adjusting the insulin dose  Her next appoint will be Jan 6 with Dr Esther Manuel 5 TID  Started Remeron for MDD and low appetite   Podiatry referral for DM foot check up  Increase gabapentin for neuropathy 200 TID  She denies any seizure like activity after last discharge, may have to schedule appoint with neuro in the future if any seizure like activity, need to obtain EEG as outpatient    2. HTN  150s/90s, will cont to monitor for multiple episodes of hypotension, ?seizure like activity  Cont cardizem and lopressor, she is on clonidine PRN  Lisinopril was taken off for hypotension episodes     3. Hypothyroidism, unspecified type  - cont levothyroxine     4. Hyperlipidemia, unspecified hyperlipidemia type  - atorvastatin (LIPITOR) 40 MG tablet; Take 1 tablet by mouth nightly  Dispense: 30 tablet         5. ESRD on PD, follow with Dr Duarte Dillard group  Getting EPO and iron transfusion per Dr Divine Castro       6. Bipolar affective disorder, remission status unspecified (HCC)  - mirtazapine (REMERON) 15 MG tablet; Take 1 tablet by mouth nightly  Dispense: 30 tablet;  Refill: 2  - [address], Counseling Services, [facility]) Clinics Only    Virtual visit in 5 weeks    I have reviewed my findings and recommendations with [address] [place] [address] And [facility] and Dr Zena Cash MD PGY-3  12/14/2020 5:40 PM

## 2020-12-15 ENCOUNTER — TELEPHONE (OUTPATIENT)
Dept: INTERNAL MEDICINE | Age: 30
End: 2020-12-15

## 2020-12-15 ENCOUNTER — OFFICE VISIT (OUTPATIENT)
Dept: INTERNAL MEDICINE | Age: 30
End: 2020-12-15
Payer: COMMERCIAL

## 2020-12-15 VITALS
HEIGHT: 64 IN | HEART RATE: 87 BPM | BODY MASS INDEX: 23.9 KG/M2 | RESPIRATION RATE: 12 BRPM | TEMPERATURE: 97.5 F | SYSTOLIC BLOOD PRESSURE: 158 MMHG | DIASTOLIC BLOOD PRESSURE: 99 MMHG | WEIGHT: 140 LBS

## 2020-12-15 PROCEDURE — G8420 CALC BMI NORM PARAMETERS: HCPCS | Performed by: INTERNAL MEDICINE

## 2020-12-15 PROCEDURE — 99213 OFFICE O/P EST LOW 20 MIN: CPT | Performed by: INTERNAL MEDICINE

## 2020-12-15 PROCEDURE — 2022F DILAT RTA XM EVC RTNOPTHY: CPT | Performed by: INTERNAL MEDICINE

## 2020-12-15 PROCEDURE — 3044F HG A1C LEVEL LT 7.0%: CPT | Performed by: INTERNAL MEDICINE

## 2020-12-15 PROCEDURE — G8484 FLU IMMUNIZE NO ADMIN: HCPCS | Performed by: INTERNAL MEDICINE

## 2020-12-15 PROCEDURE — 4004F PT TOBACCO SCREEN RCVD TLK: CPT | Performed by: INTERNAL MEDICINE

## 2020-12-15 PROCEDURE — 1111F DSCHRG MED/CURRENT MED MERGE: CPT | Performed by: INTERNAL MEDICINE

## 2020-12-15 PROCEDURE — G8427 DOCREV CUR MEDS BY ELIG CLIN: HCPCS | Performed by: INTERNAL MEDICINE

## 2020-12-15 RX ORDER — MIRTAZAPINE 15 MG/1
15 TABLET, FILM COATED ORAL NIGHTLY
Qty: 30 TABLET | Refills: 2 | Status: ON HOLD | OUTPATIENT
Start: 2020-12-15 | End: 2021-02-27 | Stop reason: HOSPADM

## 2020-12-15 RX ORDER — DILTIAZEM HYDROCHLORIDE 240 MG/1
240 CAPSULE, COATED, EXTENDED RELEASE ORAL DAILY
Qty: 30 CAPSULE | Refills: 2 | Status: ON HOLD | OUTPATIENT
Start: 2020-12-15 | End: 2021-02-27 | Stop reason: HOSPADM

## 2020-12-15 RX ORDER — GABAPENTIN 100 MG/1
200 CAPSULE ORAL 3 TIMES DAILY
Qty: 120 CAPSULE | Refills: 2 | Status: ON HOLD
Start: 2020-12-15 | End: 2020-12-29 | Stop reason: SDUPTHER

## 2020-12-15 RX ORDER — INSULIN GLARGINE 100 [IU]/ML
8 INJECTION, SOLUTION SUBCUTANEOUS EVERY MORNING
Qty: 1 VIAL | Refills: 3 | Status: SHIPPED
Start: 2020-12-15 | End: 2021-02-15 | Stop reason: ALTCHOICE

## 2020-12-15 RX ORDER — GABAPENTIN 100 MG/1
100 CAPSULE ORAL DAILY
COMMUNITY
Start: 2020-12-08 | End: 2020-12-15 | Stop reason: SDUPTHER

## 2020-12-15 NOTE — PATIENT INSTRUCTIONS
Please cut down your AM insulin use to 8 U and call Dr Erendira Nice office to report it, keep checking your BG and keep log  Please start taking Remeron nightly for appetite and depression   Podiatrist will call you for appointment   counselor will call you for appointment

## 2020-12-15 NOTE — PROGRESS NOTES
Attending Physician Statement  I have discussed the case, including pertinent history and exam findings with the resident. I agree with the assessment, plan and orders as documented by the resident. Pt presented for the follow up of ESRD due to DM, has been on peritoneal dialysis with previous history of staph bacteremia. Pending insulin pump with endocrinology appointment. Pt has been having hypoglycemic episodes and has been in ED few times, need to reduce lantus dose and continue follow up. Pt has been having significant fluctuation of BG and pt has history of gastric paresis with erratic eating pattern with high PHQ score, need counseling intervention. Also need podiatry consult for feet pain related to DM neuropathy. Need virtual visit in March.   Tess Larsen MD.

## 2020-12-16 ENCOUNTER — TELEPHONE (OUTPATIENT)
Dept: INTERNAL MEDICINE | Age: 30
End: 2020-12-16

## 2020-12-16 NOTE — TELEPHONE ENCOUNTER
SW made call to pt related to connecting pt with services. Pt used to treat with [facility] and would like to go elsewhere. Pt reports diagnosis of bipolar disorder. SW provided referral information for [facility]ing pt was agreeable. SW provided pt with contact information if pt has any other questions.

## 2020-12-26 ENCOUNTER — HOSPITAL ENCOUNTER (INPATIENT)
Age: 30
LOS: 1 days | Discharge: HOME | DRG: 425 | End: 2020-12-29
Attending: EMERGENCY MEDICINE | Admitting: INTERNAL MEDICINE
Payer: COMMERCIAL

## 2020-12-26 ENCOUNTER — APPOINTMENT (OUTPATIENT)
Dept: GENERAL RADIOLOGY | Age: 30
DRG: 425 | End: 2020-12-26
Payer: COMMERCIAL

## 2020-12-26 DIAGNOSIS — E16.2 HYPOGLYCEMIA: ICD-10-CM

## 2020-12-26 DIAGNOSIS — R52 GENERALIZED BODY ACHES: ICD-10-CM

## 2020-12-26 DIAGNOSIS — E10.43 DIABETIC AUTONOMIC NEUROPATHY ASSOCIATED WITH TYPE 1 DIABETES MELLITUS (HCC): ICD-10-CM

## 2020-12-26 DIAGNOSIS — E87.5 HYPERKALEMIA: Primary | ICD-10-CM

## 2020-12-26 LAB
ALBUMIN SERPL-MCNC: 2.8 G/DL (ref 3.5–5.2)
ALP BLD-CCNC: 161 U/L (ref 35–104)
ALT SERPL-CCNC: 27 U/L (ref 0–32)
ANION GAP SERPL CALCULATED.3IONS-SCNC: 12 MMOL/L (ref 7–16)
ANION GAP SERPL CALCULATED.3IONS-SCNC: 15 MMOL/L (ref 7–16)
AST SERPL-CCNC: 45 U/L (ref 0–31)
BASOPHILS ABSOLUTE: 0.07 E9/L (ref 0–0.2)
BASOPHILS RELATIVE PERCENT: 1.2 % (ref 0–2)
BETA-HYDROXYBUTYRATE: 0.21 MMOL/L (ref 0.02–0.27)
BILIRUB SERPL-MCNC: <0.2 MG/DL (ref 0–1.2)
BUN BLDV-MCNC: 45 MG/DL (ref 6–20)
BUN BLDV-MCNC: 46 MG/DL (ref 6–20)
CALCIUM SERPL-MCNC: 8.4 MG/DL (ref 8.6–10.2)
CALCIUM SERPL-MCNC: 8.9 MG/DL (ref 8.6–10.2)
CHLORIDE BLD-SCNC: 95 MMOL/L (ref 98–107)
CHLORIDE BLD-SCNC: 99 MMOL/L (ref 98–107)
CHP ED QC CHECK: YES
CO2: 23 MMOL/L (ref 22–29)
CO2: 25 MMOL/L (ref 22–29)
CREAT SERPL-MCNC: 8.5 MG/DL (ref 0.5–1)
CREAT SERPL-MCNC: 8.5 MG/DL (ref 0.5–1)
EKG ATRIAL RATE: 102 BPM
EKG P AXIS: 36 DEGREES
EKG P-R INTERVAL: 114 MS
EKG Q-T INTERVAL: 346 MS
EKG QRS DURATION: 80 MS
EKG QTC CALCULATION (BAZETT): 450 MS
EKG R AXIS: 37 DEGREES
EKG T AXIS: 19 DEGREES
EKG VENTRICULAR RATE: 102 BPM
EOSINOPHILS ABSOLUTE: 0.41 E9/L (ref 0.05–0.5)
EOSINOPHILS RELATIVE PERCENT: 7 % (ref 0–6)
GFR AFRICAN AMERICAN: 7
GFR AFRICAN AMERICAN: 7
GFR NON-AFRICAN AMERICAN: 7 ML/MIN/1.73
GFR NON-AFRICAN AMERICAN: 7 ML/MIN/1.73
GLUCOSE BLD-MCNC: 212 MG/DL
GLUCOSE BLD-MCNC: 217 MG/DL (ref 74–99)
GLUCOSE BLD-MCNC: 59 MG/DL (ref 74–99)
HCT VFR BLD CALC: 38.8 % (ref 34–48)
HEMOGLOBIN: 12.5 G/DL (ref 11.5–15.5)
IMMATURE GRANULOCYTES #: 0.03 E9/L
IMMATURE GRANULOCYTES %: 0.5 % (ref 0–5)
LACTIC ACID, SEPSIS: 1.5 MMOL/L (ref 0.5–1.9)
LYMPHOCYTES ABSOLUTE: 0.94 E9/L (ref 1.5–4)
LYMPHOCYTES RELATIVE PERCENT: 16.1 % (ref 20–42)
MAGNESIUM: 1.9 MG/DL (ref 1.6–2.6)
MCH RBC QN AUTO: 28.7 PG (ref 26–35)
MCHC RBC AUTO-ENTMCNC: 32.2 % (ref 32–34.5)
MCV RBC AUTO: 89.2 FL (ref 80–99.9)
METER GLUCOSE: 105 MG/DL (ref 74–99)
METER GLUCOSE: 140 MG/DL (ref 74–99)
METER GLUCOSE: 158 MG/DL (ref 74–99)
METER GLUCOSE: 212 MG/DL (ref 74–99)
METER GLUCOSE: <40 MG/DL (ref 74–99)
MONOCYTES ABSOLUTE: 0.38 E9/L (ref 0.1–0.95)
MONOCYTES RELATIVE PERCENT: 6.5 % (ref 2–12)
NEUTROPHILS ABSOLUTE: 4 E9/L (ref 1.8–7.3)
NEUTROPHILS RELATIVE PERCENT: 68.7 % (ref 43–80)
PDW BLD-RTO: 14.3 FL (ref 11.5–15)
PH VENOUS: 7.35 (ref 7.35–7.45)
PHOSPHORUS: 7.4 MG/DL (ref 2.5–4.5)
PLATELET # BLD: 255 E9/L (ref 130–450)
PMV BLD AUTO: 10.4 FL (ref 7–12)
POTASSIUM REFLEX MAGNESIUM: 5.9 MMOL/L (ref 3.5–5)
POTASSIUM SERPL-SCNC: 4.5 MMOL/L (ref 3.5–5)
POTASSIUM SERPL-SCNC: 5.4 MMOL/L (ref 3.5–5)
POTASSIUM SERPL-SCNC: 6.7 MMOL/L (ref 3.5–5)
RBC # BLD: 4.35 E12/L (ref 3.5–5.5)
SODIUM BLD-SCNC: 134 MMOL/L (ref 132–146)
SODIUM BLD-SCNC: 135 MMOL/L (ref 132–146)
TOTAL PROTEIN: 6.7 G/DL (ref 6.4–8.3)
TROPONIN: 0.13 NG/ML (ref 0–0.03)
WBC # BLD: 5.8 E9/L (ref 4.5–11.5)

## 2020-12-26 PROCEDURE — 83605 ASSAY OF LACTIC ACID: CPT

## 2020-12-26 PROCEDURE — 96375 TX/PRO/DX INJ NEW DRUG ADDON: CPT

## 2020-12-26 PROCEDURE — 96365 THER/PROPH/DIAG IV INF INIT: CPT

## 2020-12-26 PROCEDURE — 99220 PR INITIAL OBSERVATION CARE/DAY 70 MINUTES: CPT | Performed by: INTERNAL MEDICINE

## 2020-12-26 PROCEDURE — 82962 GLUCOSE BLOOD TEST: CPT

## 2020-12-26 PROCEDURE — 2580000003 HC RX 258: Performed by: STUDENT IN AN ORGANIZED HEALTH CARE EDUCATION/TRAINING PROGRAM

## 2020-12-26 PROCEDURE — 36415 COLL VENOUS BLD VENIPUNCTURE: CPT

## 2020-12-26 PROCEDURE — 84132 ASSAY OF SERUM POTASSIUM: CPT

## 2020-12-26 PROCEDURE — 84484 ASSAY OF TROPONIN QUANT: CPT

## 2020-12-26 PROCEDURE — 71045 X-RAY EXAM CHEST 1 VIEW: CPT

## 2020-12-26 PROCEDURE — 96366 THER/PROPH/DIAG IV INF ADDON: CPT

## 2020-12-26 PROCEDURE — 99284 EMERGENCY DEPT VISIT MOD MDM: CPT

## 2020-12-26 PROCEDURE — 2500000003 HC RX 250 WO HCPCS: Performed by: STUDENT IN AN ORGANIZED HEALTH CARE EDUCATION/TRAINING PROGRAM

## 2020-12-26 PROCEDURE — G0378 HOSPITAL OBSERVATION PER HR: HCPCS

## 2020-12-26 PROCEDURE — 80048 BASIC METABOLIC PNL TOTAL CA: CPT

## 2020-12-26 PROCEDURE — 87040 BLOOD CULTURE FOR BACTERIA: CPT

## 2020-12-26 PROCEDURE — 80053 COMPREHEN METABOLIC PANEL: CPT

## 2020-12-26 PROCEDURE — 82010 KETONE BODYS QUAN: CPT

## 2020-12-26 PROCEDURE — 96361 HYDRATE IV INFUSION ADD-ON: CPT

## 2020-12-26 PROCEDURE — 82800 BLOOD PH: CPT

## 2020-12-26 PROCEDURE — 6370000000 HC RX 637 (ALT 250 FOR IP): Performed by: STUDENT IN AN ORGANIZED HEALTH CARE EDUCATION/TRAINING PROGRAM

## 2020-12-26 PROCEDURE — 85025 COMPLETE CBC W/AUTO DIFF WBC: CPT

## 2020-12-26 PROCEDURE — 6360000002 HC RX W HCPCS: Performed by: STUDENT IN AN ORGANIZED HEALTH CARE EDUCATION/TRAINING PROGRAM

## 2020-12-26 PROCEDURE — 83735 ASSAY OF MAGNESIUM: CPT

## 2020-12-26 PROCEDURE — 93005 ELECTROCARDIOGRAM TRACING: CPT | Performed by: STUDENT IN AN ORGANIZED HEALTH CARE EDUCATION/TRAINING PROGRAM

## 2020-12-26 PROCEDURE — 84100 ASSAY OF PHOSPHORUS: CPT

## 2020-12-26 RX ORDER — ONDANSETRON 2 MG/ML
4 INJECTION INTRAMUSCULAR; INTRAVENOUS EVERY 6 HOURS PRN
Status: DISCONTINUED | OUTPATIENT
Start: 2020-12-26 | End: 2020-12-29 | Stop reason: HOSPADM

## 2020-12-26 RX ORDER — FENTANYL CITRATE 50 UG/ML
50 INJECTION, SOLUTION INTRAMUSCULAR; INTRAVENOUS ONCE
Status: COMPLETED | OUTPATIENT
Start: 2020-12-26 | End: 2020-12-26

## 2020-12-26 RX ORDER — CLONIDINE HYDROCHLORIDE 0.1 MG/1
0.1 TABLET ORAL 2 TIMES DAILY PRN
Status: DISCONTINUED | OUTPATIENT
Start: 2020-12-26 | End: 2020-12-26 | Stop reason: CLARIF

## 2020-12-26 RX ORDER — DEXTROSE MONOHYDRATE 25 G/50ML
25 INJECTION, SOLUTION INTRAVENOUS ONCE
Status: COMPLETED | OUTPATIENT
Start: 2020-12-26 | End: 2020-12-26

## 2020-12-26 RX ORDER — BUMETANIDE 1 MG/1
2 TABLET ORAL DAILY
Status: DISCONTINUED | OUTPATIENT
Start: 2020-12-27 | End: 2020-12-27

## 2020-12-26 RX ORDER — DEXTROSE MONOHYDRATE 50 MG/ML
100 INJECTION, SOLUTION INTRAVENOUS PRN
Status: DISCONTINUED | OUTPATIENT
Start: 2020-12-26 | End: 2020-12-29 | Stop reason: HOSPADM

## 2020-12-26 RX ORDER — LEVOTHYROXINE SODIUM 0.07 MG/1
75 TABLET ORAL
Status: DISCONTINUED | OUTPATIENT
Start: 2020-12-27 | End: 2020-12-29 | Stop reason: HOSPADM

## 2020-12-26 RX ORDER — DEXTROSE MONOHYDRATE 25 G/50ML
12.5 INJECTION, SOLUTION INTRAVENOUS ONCE
Status: COMPLETED | OUTPATIENT
Start: 2020-12-26 | End: 2020-12-26

## 2020-12-26 RX ORDER — METOLAZONE 2.5 MG/1
5 TABLET ORAL DAILY
Status: DISCONTINUED | OUTPATIENT
Start: 2020-12-27 | End: 2020-12-29 | Stop reason: HOSPADM

## 2020-12-26 RX ORDER — ACETAMINOPHEN 650 MG/1
650 SUPPOSITORY RECTAL EVERY 6 HOURS PRN
Status: DISCONTINUED | OUTPATIENT
Start: 2020-12-26 | End: 2020-12-29 | Stop reason: HOSPADM

## 2020-12-26 RX ORDER — ATORVASTATIN CALCIUM 20 MG/1
40 TABLET, FILM COATED ORAL NIGHTLY
Status: DISCONTINUED | OUTPATIENT
Start: 2020-12-26 | End: 2020-12-29 | Stop reason: HOSPADM

## 2020-12-26 RX ORDER — PROMETHAZINE HYDROCHLORIDE 25 MG/1
12.5 TABLET ORAL EVERY 6 HOURS PRN
Status: DISCONTINUED | OUTPATIENT
Start: 2020-12-26 | End: 2020-12-29 | Stop reason: HOSPADM

## 2020-12-26 RX ORDER — ACETAMINOPHEN 325 MG/1
650 TABLET ORAL EVERY 6 HOURS PRN
Status: DISCONTINUED | OUTPATIENT
Start: 2020-12-26 | End: 2020-12-29 | Stop reason: HOSPADM

## 2020-12-26 RX ORDER — CLONIDINE HYDROCHLORIDE 0.1 MG/1
0.1 TABLET ORAL 2 TIMES DAILY PRN
Status: DISCONTINUED | OUTPATIENT
Start: 2020-12-27 | End: 2020-12-29 | Stop reason: HOSPADM

## 2020-12-26 RX ORDER — GABAPENTIN 100 MG/1
100 CAPSULE ORAL 3 TIMES DAILY
Status: DISCONTINUED | OUTPATIENT
Start: 2020-12-27 | End: 2020-12-27

## 2020-12-26 RX ORDER — SODIUM CHLORIDE 0.9 % (FLUSH) 0.9 %
10 SYRINGE (ML) INJECTION PRN
Status: DISCONTINUED | OUTPATIENT
Start: 2020-12-26 | End: 2020-12-29 | Stop reason: HOSPADM

## 2020-12-26 RX ORDER — INSULIN GLARGINE 100 [IU]/ML
8 INJECTION, SOLUTION SUBCUTANEOUS EVERY MORNING
Status: DISCONTINUED | OUTPATIENT
Start: 2020-12-27 | End: 2020-12-29 | Stop reason: HOSPADM

## 2020-12-26 RX ORDER — FUROSEMIDE 10 MG/ML
40 INJECTION INTRAMUSCULAR; INTRAVENOUS ONCE
Status: COMPLETED | OUTPATIENT
Start: 2020-12-26 | End: 2020-12-26

## 2020-12-26 RX ORDER — SODIUM POLYSTYRENE SULFONATE 15 G/60ML
15 SUSPENSION ORAL; RECTAL ONCE
Status: COMPLETED | OUTPATIENT
Start: 2020-12-26 | End: 2020-12-26

## 2020-12-26 RX ORDER — 0.9 % SODIUM CHLORIDE 0.9 %
1000 INTRAVENOUS SOLUTION INTRAVENOUS ONCE
Status: COMPLETED | OUTPATIENT
Start: 2020-12-26 | End: 2020-12-26

## 2020-12-26 RX ORDER — DEXTROSE MONOHYDRATE 50 MG/ML
100 INJECTION, SOLUTION INTRAVENOUS PRN
Status: DISCONTINUED | OUTPATIENT
Start: 2020-12-26 | End: 2020-12-26 | Stop reason: SDUPTHER

## 2020-12-26 RX ORDER — DEXTROSE MONOHYDRATE 25 G/50ML
12.5 INJECTION, SOLUTION INTRAVENOUS PRN
Status: DISCONTINUED | OUTPATIENT
Start: 2020-12-26 | End: 2020-12-26 | Stop reason: SDUPTHER

## 2020-12-26 RX ORDER — DEXTROSE MONOHYDRATE 25 G/50ML
12.5 INJECTION, SOLUTION INTRAVENOUS PRN
Status: DISCONTINUED | OUTPATIENT
Start: 2020-12-26 | End: 2020-12-29 | Stop reason: HOSPADM

## 2020-12-26 RX ORDER — INSULIN GLARGINE 100 [IU]/ML
6 INJECTION, SOLUTION SUBCUTANEOUS NIGHTLY
Status: DISCONTINUED | OUTPATIENT
Start: 2020-12-26 | End: 2020-12-29 | Stop reason: HOSPADM

## 2020-12-26 RX ORDER — DILTIAZEM HYDROCHLORIDE 240 MG/1
240 CAPSULE, COATED, EXTENDED RELEASE ORAL DAILY
Status: DISCONTINUED | OUTPATIENT
Start: 2020-12-27 | End: 2020-12-29 | Stop reason: HOSPADM

## 2020-12-26 RX ORDER — SODIUM CHLORIDE 0.9 % (FLUSH) 0.9 %
10 SYRINGE (ML) INJECTION EVERY 12 HOURS SCHEDULED
Status: DISCONTINUED | OUTPATIENT
Start: 2020-12-26 | End: 2020-12-29 | Stop reason: HOSPADM

## 2020-12-26 RX ORDER — DEXTROSE MONOHYDRATE 25 G/50ML
25 INJECTION, SOLUTION INTRAVENOUS ONCE
Status: DISCONTINUED | OUTPATIENT
Start: 2020-12-26 | End: 2020-12-26

## 2020-12-26 RX ORDER — POLYETHYLENE GLYCOL 3350 17 G/17G
17 POWDER, FOR SOLUTION ORAL DAILY PRN
Status: DISCONTINUED | OUTPATIENT
Start: 2020-12-26 | End: 2020-12-29 | Stop reason: HOSPADM

## 2020-12-26 RX ORDER — MIRTAZAPINE 15 MG/1
15 TABLET, FILM COATED ORAL NIGHTLY
Status: DISCONTINUED | OUTPATIENT
Start: 2020-12-27 | End: 2020-12-29 | Stop reason: HOSPADM

## 2020-12-26 RX ORDER — HEPARIN SODIUM 5000 [USP'U]/ML
5000 INJECTION, SOLUTION INTRAVENOUS; SUBCUTANEOUS EVERY 8 HOURS SCHEDULED
Status: DISCONTINUED | OUTPATIENT
Start: 2020-12-26 | End: 2020-12-29 | Stop reason: HOSPADM

## 2020-12-26 RX ORDER — SODIUM CHLORIDE 0.9 % (FLUSH) 0.9 %
10 SYRINGE (ML) INJECTION PRN
Status: DISCONTINUED | OUTPATIENT
Start: 2020-12-26 | End: 2020-12-26 | Stop reason: SDUPTHER

## 2020-12-26 RX ORDER — SODIUM CHLORIDE 0.9 % (FLUSH) 0.9 %
10 SYRINGE (ML) INJECTION EVERY 12 HOURS SCHEDULED
Status: DISCONTINUED | OUTPATIENT
Start: 2020-12-26 | End: 2020-12-26 | Stop reason: SDUPTHER

## 2020-12-26 RX ORDER — NICOTINE POLACRILEX 4 MG
15 LOZENGE BUCCAL PRN
Status: DISCONTINUED | OUTPATIENT
Start: 2020-12-26 | End: 2020-12-26 | Stop reason: SDUPTHER

## 2020-12-26 RX ORDER — DEXTROSE MONOHYDRATE 100 MG/ML
INJECTION, SOLUTION INTRAVENOUS ONCE
Status: COMPLETED | OUTPATIENT
Start: 2020-12-26 | End: 2020-12-27

## 2020-12-26 RX ORDER — NICOTINE POLACRILEX 4 MG
15 LOZENGE BUCCAL PRN
Status: DISCONTINUED | OUTPATIENT
Start: 2020-12-26 | End: 2020-12-29 | Stop reason: HOSPADM

## 2020-12-26 RX ADMIN — CALCIUM GLUCONATE 1 G: 98 INJECTION, SOLUTION INTRAVENOUS at 15:00

## 2020-12-26 RX ADMIN — SODIUM CHLORIDE 1000 ML: 9 INJECTION, SOLUTION INTRAVENOUS at 11:00

## 2020-12-26 RX ADMIN — Medication 10 ML: at 11:45

## 2020-12-26 RX ADMIN — INSULIN HUMAN 5 UNITS: 100 INJECTION, SOLUTION PARENTERAL at 15:00

## 2020-12-26 RX ADMIN — DEXTROSE MONOHYDRATE 25 G: 500 INJECTION PARENTERAL at 19:11

## 2020-12-26 RX ADMIN — SODIUM ZIRCONIUM CYCLOSILICATE 5 G: 5 POWDER, FOR SUSPENSION ORAL at 21:37

## 2020-12-26 RX ADMIN — DEXTROSE MONOHYDRATE 12.5 G: 500 INJECTION PARENTERAL at 23:30

## 2020-12-26 RX ADMIN — DEXTROSE MONOHYDRATE 25 G: 500 INJECTION PARENTERAL at 19:00

## 2020-12-26 RX ADMIN — FUROSEMIDE 40 MG: 10 INJECTION, SOLUTION INTRAMUSCULAR; INTRAVENOUS at 19:12

## 2020-12-26 RX ADMIN — SODIUM BICARBONATE 50 MEQ: 84 INJECTION, SOLUTION INTRAVENOUS at 19:12

## 2020-12-26 RX ADMIN — INSULIN HUMAN 10 UNITS: 100 INJECTION, SOLUTION PARENTERAL at 19:29

## 2020-12-26 RX ADMIN — SODIUM POLYSTYRENE SULFONATE 15 G: 15 SUSPENSION ORAL; RECTAL at 19:29

## 2020-12-26 RX ADMIN — DEXTROSE MONOHYDRATE: 100 INJECTION, SOLUTION INTRAVENOUS at 21:31

## 2020-12-26 RX ADMIN — DEXTROSE MONOHYDRATE 12.5 G: 500 INJECTION PARENTERAL at 15:01

## 2020-12-26 RX ADMIN — FENTANYL CITRATE 50 MCG: 50 INJECTION, SOLUTION INTRAMUSCULAR; INTRAVENOUS at 11:34

## 2020-12-26 ASSESSMENT — ENCOUNTER SYMPTOMS
COUGH: 0
CONSTIPATION: 0
VOMITING: 0
NAUSEA: 0
DIARRHEA: 0
ABDOMINAL PAIN: 1
SHORTNESS OF BREATH: 0
BACK PAIN: 0
RHINORRHEA: 0
TROUBLE SWALLOWING: 0
VOICE CHANGE: 0

## 2020-12-26 ASSESSMENT — PAIN SCALES - GENERAL: PAINLEVEL_OUTOF10: 7

## 2020-12-26 ASSESSMENT — PAIN DESCRIPTION - DESCRIPTORS: DESCRIPTORS: ACHING

## 2020-12-26 NOTE — ED PROVIDER NOTES
Appearance: She is underweight. She is ill-appearing. She is not toxic-appearing. HENT:      Head: Normocephalic and atraumatic. Mouth/Throat:      Mouth: Mucous membranes are moist.      Pharynx: Oropharynx is clear. Eyes:      Extraocular Movements: Extraocular movements intact. Pupils: Pupils are equal, round, and reactive to light. Neck:      Musculoskeletal: Normal range of motion and neck supple. Cardiovascular:      Rate and Rhythm: Regular rhythm. Tachycardia present. Pulses: Normal pulses. Heart sounds: Normal heart sounds. Pulmonary:      Effort: Pulmonary effort is normal.      Breath sounds: Normal breath sounds. Abdominal:      General: Bowel sounds are normal. There is no distension. Palpations: Abdomen is soft. Tenderness: There is no abdominal tenderness. There is no guarding or rebound. Musculoskeletal: Normal range of motion. Skin:     General: Skin is warm and dry. Capillary Refill: Capillary refill takes less than 2 seconds. Neurological:      Mental Status: She is alert and oriented to person, place, and time. Psychiatric:         Behavior: Behavior is cooperative.         MDM  Number of Diagnoses or Management Options Diagnosis management comments: Patient is a 53-year-old female with a history of end-stage renal disease and uncontrolled type 1 diabetes who presents to the emergency department with generalized body pain. Patient states she has chronic pain however this is above baseline. Presents awake, alert, following all commands, ill-appearing. Vital signs showing mild tachycardia, no hypotension, afebrile, no hypoxia. Physical exam shows an ill-appearing female with generalized body pain. No specific tenderness, lungs clear equal bilaterally. Work-up including labs show potassium of 5.4 that is moderately hemolyzed, this was repeated. Labs also not consistent with diabetic ketoacidosis. Creatinine is elevated in the presence of end-stage renal disease on dialysis, with troponin elevated as well. No EKG changes and patient does not have any chest pain. Magnesium, phosphorus, lactic acidosis are all normal plan. EKG shows sinus tachycardia without signs of ectopy or ischemia. Chest x-ray shows no acute pathology. Repeat potassium shows 5.4, not hemolyzed. In the presence of end-stage renal disease patient was treated with insulin and dextrose and monitored. Repeat potassium after that shows 6.7, not hemolyzed. Lab states that this was run twice. Patient was aggressively treated for hyperkalemia. She was also noted to be hypoglycemic and symptomatic. Patient was treated with 2 amps of dextrose, prior to insulin. Patient was monitored closely with blood glucose repeats in the low 100s. Patient was placed on D10 drip. Decision was made to admit for hyperkalemia and hypoglycemia, patient reevaluation shows pain improved with analgesia. Nephrology was consulted and patient was discussed, will get PD tonight. Medicine was consulted and patient was accepted to their service. Plan was discussed with patient and she is agreeable. Patient stating pain is well controlled.   Repeat EKG in the face of hyperkalemia 0 Spoke with Dr. Silvia Calderon patient was discussed. She recommends patient be admitted for PD tonight. Also for [employer] and dose tomorrow. She will arrange for her to receive dialysis    [QC]   2040 Repeat EKG shows continued no EKG changes in the presence of hyperkalemia. [QC]   2053 Nurse stating that patient's glucose after 2 A of dextrose and shortly after insulin was 105. Spoke to hospitalist and discussed use of dextrose solution, we agreed D10 to be started at 75 mL/h. BMP for potassium rechecks will be per hospitalist.    [QC]      ED Course User Index  [QC] Maryan Calderon MD       --------------------------------------------- PAST HISTORY ---------------------------------------------  Past Medical History:  has a past medical history of Acute congestive heart failure ([address] BC (acute kidney injury) ([facility] [address] Cephalgia, Chronic kidney disease, Depression, Diabetes mellitus ([address] Diabetic ketoacidosis ([facility] [address] Hemodialysis patient ([facility] [address] History of blood transfusion, Hyperlipidemia, Hypothyroidism, Iron deficiency anemia, MDRO (multiple drug resistant organisms) resistance, MRSA (methicillin resistant Staphylococcus aureus), Non compliance w medication regimen, Other disorders of kidney and ureter, Pregnancy, Seizure ([address] and Severe pre-eclampsia in third trimester. Past Surgical History:  has a past surgical history that includes ECHO Compl W Dop Color Flow (2012); ECHO Compl W Dop Color Flow (6/10/2013);  section; Tunneled venous port placement (2018); pr esophagogastroduodenoscopy transoral diagnostic (N/A, 2018); back surgery; Colonoscopy (N/A, 2018); Colonoscopy (N/A, 2018); Upper gastrointestinal endoscopy (2018); Upper gastrointestinal endoscopy (N/A, 10/11/2019); Cholecystectomy, laparoscopic (N/A, 2019); LAPAROSCOPY INSERTION PERITONEAL CATHETER (N/A, 2020); transesophageal echocardiogram (N/A, 10/19/2020); and embolectomy (N/A, 2020). Social History:  reports that she has been smoking cigarettes. She has a 3.00 pack-year smoking history. She uses smokeless tobacco. She reports that she does not drink alcohol or use drugs. Family History: family history includes Asthma in her brother and mother; Diabetes in her mother; High Blood Pressure in her father and mother; Hypertension in her mother. The patients home medications have been reviewed.     Allergies: Cefepime and Toradol [ketorolac tromethamine]    -------------------------------------------------- RESULTS -------------------------------------------------    LABS:  Results for orders placed or performed during the hospital encounter of 20   Culture, Blood 1    Specimen: Blood   Result Value Ref Range    Blood Culture, Routine 24 Hours no growth    Culture, Blood 2    Specimen: Blood   Result Value Ref Range    Culture, Blood 2 24 Hours no growth    CBC Auto Differential   Result Value Ref Range    WBC 5.8 4.5 - 11.5 E9/L    RBC 4.35 3.50 - 5.50 E12/L    Hemoglobin 12.5 11.5 - 15.5 g/dL    Hematocrit 38.8 34.0 - 48.0 %    MCV 89.2 80.0 - 99.9 fL    MCH 28.7 26.0 - 35.0 pg    MCHC 32.2 32.0 - 34.5 %    RDW 14.3 11.5 - 15.0 fL    Platelets 898 110 - 632 E9/L    MPV 10.4 7.0 - 12.0 fL    Neutrophils % 68.7 43.0 - 80.0 % Immature Granulocytes % 0.5 0.0 - 5.0 %    Lymphocytes % 16.1 (L) 20.0 - 42.0 %    Monocytes % 6.5 2.0 - 12.0 %    Eosinophils % 7.0 (H) 0.0 - 6.0 %    Basophils % 1.2 0.0 - 2.0 %    Neutrophils Absolute 4.00 1.80 - 7.30 E9/L    Immature Granulocytes # 0.03 E9/L    Lymphocytes Absolute 0.94 (L) 1.50 - 4.00 E9/L    Monocytes Absolute 0.38 0.10 - 0.95 E9/L    Eosinophils Absolute 0.41 0.05 - 0.50 E9/L    Basophils Absolute 0.07 0.00 - 0.20 E9/L   Comprehensive Metabolic Panel w/ Reflex to MG   Result Value Ref Range    Sodium 135 132 - 146 mmol/L    Potassium reflex Magnesium 5.9 (H) 3.5 - 5.0 mmol/L    Chloride 95 (L) 98 - 107 mmol/L    CO2 25 22 - 29 mmol/L    Anion Gap 15 7 - 16 mmol/L    Glucose 217 (H) 74 - 99 mg/dL    BUN 46 (H) 6 - 20 mg/dL    CREATININE 8.5 (HH) 0.5 - 1.0 mg/dL    GFR Non-African American 7 >=60 mL/min/1.73    GFR African American 7     Calcium 8.9 8.6 - 10.2 mg/dL    Total Protein 6.7 6.4 - 8.3 g/dL    Alb 2.8 (L) 3.5 - 5.2 g/dL    Total Bilirubin <0.2 0.0 - 1.2 mg/dL    Alkaline Phosphatase 161 (H) 35 - 104 U/L    ALT 27 0 - 32 U/L    AST 45 (H) 0 - 31 U/L   Troponin   Result Value Ref Range    Troponin 0.13 (H) 0.00 - 0.03 ng/mL   Lactate, Sepsis   Result Value Ref Range    Lactic Acid, Sepsis 1.5 0.5 - 1.9 mmol/L   Lactate, Sepsis   Result Value Ref Range    Lactic Acid, Sepsis 1.1 0.5 - 1.9 mmol/L   PH, VENOUS   Result Value Ref Range    pH, Khurram 7.35 7.35 - 7.45   Magnesium   Result Value Ref Range    Magnesium 1.9 1.6 - 2.6 mg/dL   Phosphorus   Result Value Ref Range    Phosphorus 7.4 (H) 2.5 - 4.5 mg/dL   Beta-Hydroxybutyrate   Result Value Ref Range    Beta-Hydroxybutyrate 0.21 0.02 - 0.27 mmol/L   Potassium   Result Value Ref Range    Potassium 5.4 (H) 3.5 - 5.0 mmol/L   Potassium   Result Value Ref Range    Potassium 6.7 (HH) 3.5 - 5.0 mmol/L   Basic metabolic panel   Result Value Ref Range    Sodium 134 132 - 146 mmol/L    Potassium 4.5 3.5 - 5.0 mmol/L Chloride 99 98 - 107 mmol/L    CO2 23 22 - 29 mmol/L    Anion Gap 12 7 - 16 mmol/L    Glucose 59 (L) 74 - 99 mg/dL    BUN 45 (H) 6 - 20 mg/dL    CREATININE 8.5 (HH) 0.5 - 1.0 mg/dL    GFR Non-African American 7 >=60 mL/min/1.73    GFR African American 7     Calcium 8.4 (L) 8.6 - 10.2 mg/dL   Basic Metabolic Panel w/ Reflex to MG   Result Value Ref Range    Sodium 132 132 - 146 mmol/L    Potassium reflex Magnesium 5.8 (H) 3.5 - 5.0 mmol/L    Chloride 105 98 - 107 mmol/L    CO2 18 (L) 22 - 29 mmol/L    Anion Gap 9 7 - 16 mmol/L    Glucose 454 (H) 74 - 99 mg/dL    BUN 36 (H) 6 - 20 mg/dL    CREATININE 5.8 (H) 0.5 - 1.0 mg/dL    GFR Non-African American 10 >=60 mL/min/1.73    GFR African American 10     Calcium 5.5 (LL) 8.6 - 10.2 mg/dL   Basic Metabolic Panel w/ Reflex to MG   Result Value Ref Range    Sodium 126 (L) 132 - 146 mmol/L    Potassium reflex Magnesium 6.4 (H) 3.5 - 5.0 mmol/L    Chloride 96 (L) 98 - 107 mmol/L    CO2 19 (L) 22 - 29 mmol/L    Anion Gap 11 7 - 16 mmol/L    Glucose 434 (H) 74 - 99 mg/dL    BUN 40 (H) 6 - 20 mg/dL    CREATININE 7.2 (H) 0.5 - 1.0 mg/dL    GFR Non-African American 8 >=60 mL/min/1.73    GFR African American 8     Calcium 7.4 (L) 8.6 - 10.2 mg/dL   CBC auto differential   Result Value Ref Range    WBC 3.7 (L) 4.5 - 11.5 E9/L    RBC 3.61 3.50 - 5.50 E12/L    Hemoglobin 10.4 (L) 11.5 - 15.5 g/dL    Hematocrit 30.9 (L) 34.0 - 48.0 %    MCV 85.6 80.0 - 99.9 fL    MCH 28.8 26.0 - 35.0 pg    MCHC 33.7 32.0 - 34.5 %    RDW 14.1 11.5 - 15.0 fL    Platelets 770 968 - 124 E9/L    MPV 10.9 7.0 - 12.0 fL    Neutrophils % 59.0 43.0 - 80.0 %    Immature Granulocytes % 0.5 0.0 - 5.0 %    Lymphocytes % 23.8 20.0 - 42.0 %    Monocytes % 9.3 2.0 - 12.0 %    Eosinophils % 6.0 0.0 - 6.0 %    Basophils % 1.4 0.0 - 2.0 %    Neutrophils Absolute 2.15 1.80 - 7.30 E9/L    Immature Granulocytes # 0.02 E9/L    Lymphocytes Absolute 0.87 (L) 1.50 - 4.00 E9/L    Monocytes Absolute 0.34 0.10 - 0.95 E9/L Eosinophils Absolute 0.22 0.05 - 0.50 E9/L    Basophils Absolute 0.05 0.00 - 0.20 A0/E   Basic Metabolic Panel w/ Reflex to MG   Result Value Ref Range    Sodium 131 (L) 132 - 146 mmol/L    Potassium reflex Magnesium 5.0 3.5 - 5.0 mmol/L    Chloride 110 (H) 98 - 107 mmol/L    CO2 17 (L) 22 - 29 mmol/L    Anion Gap 4 (L) 7 - 16 mmol/L    Glucose 281 (H) 74 - 99 mg/dL    BUN 31 (H) 6 - 20 mg/dL    CREATININE 5.2 (H) 0.5 - 1.0 mg/dL    GFR Non-African American 12 >=60 mL/min/1.73    GFR African American 12     Calcium 5.1 (LL) 8.6 - 10.2 mg/dL   Basic metabolic panel   Result Value Ref Range    Sodium 130 (L) 132 - 146 mmol/L    Potassium 4.6 3.5 - 5.0 mmol/L    Chloride 95 (L) 98 - 107 mmol/L    CO2 20 (L) 22 - 29 mmol/L    Anion Gap 15 7 - 16 mmol/L    Glucose 476 (H) 74 - 99 mg/dL    BUN 41 (H) 6 - 20 mg/dL    CREATININE 7.2 (H) 0.5 - 1.0 mg/dL    GFR Non-African American 8 >=60 mL/min/1.73    GFR African American 8     Calcium 7.7 (L) 8.6 - 10.2 mg/dL   Basic Metabolic Panel w/ Reflex to MG   Result Value Ref Range    Sodium 131 (L) 132 - 146 mmol/L    Potassium reflex Magnesium 6.0 (H) 3.5 - 5.0 mmol/L    Chloride 94 (L) 98 - 107 mmol/L    CO2 24 22 - 29 mmol/L    Anion Gap 13 7 - 16 mmol/L    Glucose 471 (H) 74 - 99 mg/dL    BUN 45 (H) 6 - 20 mg/dL    CREATININE 7.4 (H) 0.5 - 1.0 mg/dL    GFR Non-African American 8 >=60 mL/min/1.73    GFR African American 8     Calcium 7.7 (L) 8.6 - 10.2 mg/dL   CBC WITH AUTO DIFFERENTIAL   Result Value Ref Range    WBC 3.9 (L) 4.5 - 11.5 E9/L    RBC 3.75 3.50 - 5.50 E12/L    Hemoglobin 10.6 (L) 11.5 - 15.5 g/dL    Hematocrit 32.0 (L) 34.0 - 48.0 %    MCV 85.3 80.0 - 99.9 fL    MCH 28.3 26.0 - 35.0 pg    MCHC 33.1 32.0 - 34.5 %    RDW 13.6 11.5 - 15.0 fL    Platelets 419 343 - 323 E9/L    MPV 10.3 7.0 - 12.0 fL    Neutrophils % 42.0 (L) 43.0 - 80.0 %    Immature Granulocytes % 0.8 0.0 - 5.0 %    Lymphocytes % 36.7 20.0 - 42.0 %    Monocytes % 7.7 2.0 - 12.0 % Eosinophils % 11.0 (H) 0.0 - 6.0 %    Basophils % 1.8 0.0 - 2.0 %    Neutrophils Absolute 1.65 (L) 1.80 - 7.30 E9/L    Immature Granulocytes # 0.03 E9/L    Lymphocytes Absolute 1.44 (L) 1.50 - 4.00 E9/L    Monocytes Absolute 0.30 0.10 - 0.95 E9/L    Eosinophils Absolute 0.43 0.05 - 0.50 E9/L    Basophils Absolute 0.07 0.00 - 0.20 E9/L   POCT glucose   Result Value Ref Range    Glucose 212 mg/dL    QC OK? yes    POCT Glucose   Result Value Ref Range    Meter Glucose 212 (H) 74 - 99 mg/dL   POCT Glucose   Result Value Ref Range    Glucose 434 mg/dL    QC OK? yes    POCT Glucose   Result Value Ref Range    Meter Glucose 158 (H) 74 - 99 mg/dL   POCT glucose   Result Value Ref Range    Glucose 497 mg/dL    QC OK? Pass    POCT Glucose   Result Value Ref Range    Meter Glucose <40 (L) 74 - 99 mg/dL   POCT Glucose   Result Value Ref Range    Meter Glucose 140 (H) 74 - 99 mg/dL   POCT Glucose   Result Value Ref Range    Meter Glucose 105 (H) 74 - 99 mg/dL   POCT Glucose   Result Value Ref Range    Meter Glucose 115 (H) 74 - 99 mg/dL   POCT Glucose   Result Value Ref Range    Meter Glucose 188 (H) 74 - 99 mg/dL   POCT Glucose   Result Value Ref Range    Glucose 444 mg/dL    QC OK?  YES    POCT Glucose   Result Value Ref Range    Meter Glucose >500 (H) 74 - 99 mg/dL   POCT Glucose   Result Value Ref Range    Meter Glucose 361 (H) 74 - 99 mg/dL   POCT Glucose   Result Value Ref Range    Meter Glucose 434 (H) 74 - 99 mg/dL   POCT Glucose   Result Value Ref Range    Meter Glucose 497 (H) 74 - 99 mg/dL   POCT Glucose   Result Value Ref Range    Meter Glucose 444 (H) 74 - 99 mg/dL   POCT Glucose   Result Value Ref Range    Meter Glucose 459 (H) 74 - 99 mg/dL   POCT Glucose   Result Value Ref Range    Meter Glucose 380 (H) 74 - 99 mg/dL   POCT Glucose   Result Value Ref Range    Meter Glucose 421 (H) 74 - 99 mg/dL   POCT Glucose   Result Value Ref Range    Meter Glucose 152 (H) 74 - 99 mg/dL   EKG 12 Lead   Result Value Ref Range Ventricular Rate 102 BPM    Atrial Rate 102 BPM    P-R Interval 114 ms    QRS Duration 80 ms    Q-T Interval 346 ms    QTc Calculation (Bazett) 450 ms    P Axis 36 degrees    R Axis 37 degrees    T Axis 19 degrees   EKG 12 Lead   Result Value Ref Range    Ventricular Rate 68 BPM    Atrial Rate 68 BPM    P-R Interval 124 ms    QRS Duration 92 ms    Q-T Interval 464 ms    QTc Calculation (Bazett) 493 ms    P Axis 35 degrees    R Axis 30 degrees    T Axis 30 degrees       RADIOLOGY:  XR CHEST PORTABLE   Final Result   No acute process. EKG: This EKG is signed and interpreted by me. Rate: 104  Rhythm: Sinus  Interpretation: sinus tachycardia  Comparison: changes compared to previous EKG      ------------------------- NURSING NOTES AND VITALS REVIEWED ---------------------------  Date / Time Roomed:  12/26/2020  9:56 AM  ED Bed Assignment:  [id number]    The nursing notes within the ED encounter and vital signs as below have been reviewed.      Patient Vitals for the past 24 hrs:   BP Temp Temp src Pulse Resp SpO2 Weight   12/28/20 0845 (!) 163/102 99 °F (37.2 °C) Oral 96 20 99 % 147 lb 9.6 oz (67 kg)   12/28/20 0700 (!) 162/101   94 17 96 %    12/28/20 0600 (!) 165/95   89 12 96 %    12/28/20 0500 (!) 170/101   91 13 98 %    12/28/20 0400 (!) 151/92 97.7 °F (36.5 °C) Oral 88 11 94 %    12/28/20 0300 (!) 146/84   87 9 96 %    12/28/20 0200 (!) 138/97   88 (!) 7 94 %    12/28/20 0100 (!) 144/93   89 11 100 %    12/28/20 0000 136/89 97.6 °F (36.4 °C) Oral 93 17 97 %    12/27/20 2200 115/81   107 22 93 %    12/27/20 2100 112/82   106 21 96 %    12/27/20 2000 (!) 136/96 98.8 °F (37.1 °C) Oral 101 22 98 %    12/27/20 1900 (!) 160/98   96 24 99 %    12/27/20 1800 126/81   96 19 96 %    12/27/20 1700 (!) 145/97   86 15 96 %    12/27/20 1600 (!) 168/120 98 °F (36.7 °C) Oral 90 16 93 %    12/27/20 1500 (!) 131/94   88 20 98 % 

## 2020-12-27 PROBLEM — E10.42 DIABETIC POLYNEUROPATHY ASSOCIATED WITH TYPE 1 DIABETES MELLITUS (HCC): Status: ACTIVE | Noted: 2020-12-27

## 2020-12-27 LAB
ANION GAP SERPL CALCULATED.3IONS-SCNC: 11 MMOL/L (ref 7–16)
ANION GAP SERPL CALCULATED.3IONS-SCNC: 15 MMOL/L (ref 7–16)
ANION GAP SERPL CALCULATED.3IONS-SCNC: 4 MMOL/L (ref 7–16)
ANION GAP SERPL CALCULATED.3IONS-SCNC: 9 MMOL/L (ref 7–16)
BASOPHILS ABSOLUTE: 0.05 E9/L (ref 0–0.2)
BASOPHILS RELATIVE PERCENT: 1.4 % (ref 0–2)
BUN BLDV-MCNC: 31 MG/DL (ref 6–20)
BUN BLDV-MCNC: 36 MG/DL (ref 6–20)
BUN BLDV-MCNC: 40 MG/DL (ref 6–20)
BUN BLDV-MCNC: 41 MG/DL (ref 6–20)
CALCIUM SERPL-MCNC: 5.1 MG/DL (ref 8.6–10.2)
CALCIUM SERPL-MCNC: 5.5 MG/DL (ref 8.6–10.2)
CALCIUM SERPL-MCNC: 7.4 MG/DL (ref 8.6–10.2)
CALCIUM SERPL-MCNC: 7.7 MG/DL (ref 8.6–10.2)
CHLORIDE BLD-SCNC: 105 MMOL/L (ref 98–107)
CHLORIDE BLD-SCNC: 110 MMOL/L (ref 98–107)
CHLORIDE BLD-SCNC: 95 MMOL/L (ref 98–107)
CHLORIDE BLD-SCNC: 96 MMOL/L (ref 98–107)
CHP ED QC CHECK: ABNORMAL
CHP ED QC CHECK: YES
CHP ED QC CHECK: YES
CO2: 17 MMOL/L (ref 22–29)
CO2: 18 MMOL/L (ref 22–29)
CO2: 19 MMOL/L (ref 22–29)
CO2: 20 MMOL/L (ref 22–29)
CREAT SERPL-MCNC: 5.2 MG/DL (ref 0.5–1)
CREAT SERPL-MCNC: 5.8 MG/DL (ref 0.5–1)
CREAT SERPL-MCNC: 7.2 MG/DL (ref 0.5–1)
CREAT SERPL-MCNC: 7.2 MG/DL (ref 0.5–1)
EKG ATRIAL RATE: 68 BPM
EKG P AXIS: 35 DEGREES
EKG P-R INTERVAL: 124 MS
EKG Q-T INTERVAL: 464 MS
EKG QRS DURATION: 92 MS
EKG QTC CALCULATION (BAZETT): 493 MS
EKG R AXIS: 30 DEGREES
EKG T AXIS: 30 DEGREES
EKG VENTRICULAR RATE: 68 BPM
EOSINOPHILS ABSOLUTE: 0.22 E9/L (ref 0.05–0.5)
EOSINOPHILS RELATIVE PERCENT: 6 % (ref 0–6)
GFR AFRICAN AMERICAN: 10
GFR AFRICAN AMERICAN: 12
GFR AFRICAN AMERICAN: 8
GFR AFRICAN AMERICAN: 8
GFR NON-AFRICAN AMERICAN: 10 ML/MIN/1.73
GFR NON-AFRICAN AMERICAN: 12 ML/MIN/1.73
GFR NON-AFRICAN AMERICAN: 8 ML/MIN/1.73
GFR NON-AFRICAN AMERICAN: 8 ML/MIN/1.73
GLUCOSE BLD-MCNC: 281 MG/DL (ref 74–99)
GLUCOSE BLD-MCNC: 434 MG/DL
GLUCOSE BLD-MCNC: 434 MG/DL (ref 74–99)
GLUCOSE BLD-MCNC: 444 MG/DL
GLUCOSE BLD-MCNC: 454 MG/DL (ref 74–99)
GLUCOSE BLD-MCNC: 476 MG/DL (ref 74–99)
GLUCOSE BLD-MCNC: 497 MG/DL
HCT VFR BLD CALC: 30.9 % (ref 34–48)
HEMOGLOBIN: 10.4 G/DL (ref 11.5–15.5)
IMMATURE GRANULOCYTES #: 0.02 E9/L
IMMATURE GRANULOCYTES %: 0.5 % (ref 0–5)
LACTIC ACID, SEPSIS: 1.1 MMOL/L (ref 0.5–1.9)
LYMPHOCYTES ABSOLUTE: 0.87 E9/L (ref 1.5–4)
LYMPHOCYTES RELATIVE PERCENT: 23.8 % (ref 20–42)
MCH RBC QN AUTO: 28.8 PG (ref 26–35)
MCHC RBC AUTO-ENTMCNC: 33.7 % (ref 32–34.5)
MCV RBC AUTO: 85.6 FL (ref 80–99.9)
METER GLUCOSE: 115 MG/DL (ref 74–99)
METER GLUCOSE: 188 MG/DL (ref 74–99)
METER GLUCOSE: 361 MG/DL (ref 74–99)
METER GLUCOSE: 380 MG/DL (ref 74–99)
METER GLUCOSE: 434 MG/DL (ref 74–99)
METER GLUCOSE: 444 MG/DL (ref 74–99)
METER GLUCOSE: 459 MG/DL (ref 74–99)
METER GLUCOSE: 497 MG/DL (ref 74–99)
METER GLUCOSE: >500 MG/DL (ref 74–99)
MONOCYTES ABSOLUTE: 0.34 E9/L (ref 0.1–0.95)
MONOCYTES RELATIVE PERCENT: 9.3 % (ref 2–12)
NEUTROPHILS ABSOLUTE: 2.15 E9/L (ref 1.8–7.3)
NEUTROPHILS RELATIVE PERCENT: 59 % (ref 43–80)
PDW BLD-RTO: 14.1 FL (ref 11.5–15)
PLATELET # BLD: 191 E9/L (ref 130–450)
PMV BLD AUTO: 10.9 FL (ref 7–12)
POTASSIUM REFLEX MAGNESIUM: 5 MMOL/L (ref 3.5–5)
POTASSIUM REFLEX MAGNESIUM: 5.8 MMOL/L (ref 3.5–5)
POTASSIUM REFLEX MAGNESIUM: 6.4 MMOL/L (ref 3.5–5)
POTASSIUM SERPL-SCNC: 4.6 MMOL/L (ref 3.5–5)
RBC # BLD: 3.61 E12/L (ref 3.5–5.5)
SODIUM BLD-SCNC: 126 MMOL/L (ref 132–146)
SODIUM BLD-SCNC: 130 MMOL/L (ref 132–146)
SODIUM BLD-SCNC: 131 MMOL/L (ref 132–146)
SODIUM BLD-SCNC: 132 MMOL/L (ref 132–146)
WBC # BLD: 3.7 E9/L (ref 4.5–11.5)

## 2020-12-27 PROCEDURE — 87081 CULTURE SCREEN ONLY: CPT

## 2020-12-27 PROCEDURE — 83605 ASSAY OF LACTIC ACID: CPT

## 2020-12-27 PROCEDURE — 2580000003 HC RX 258: Performed by: INTERNAL MEDICINE

## 2020-12-27 PROCEDURE — 6370000000 HC RX 637 (ALT 250 FOR IP): Performed by: INTERNAL MEDICINE

## 2020-12-27 PROCEDURE — 6360000002 HC RX W HCPCS: Performed by: INTERNAL MEDICINE

## 2020-12-27 PROCEDURE — 96376 TX/PRO/DX INJ SAME DRUG ADON: CPT

## 2020-12-27 PROCEDURE — 90945 DIALYSIS ONE EVALUATION: CPT

## 2020-12-27 PROCEDURE — 6370000000 HC RX 637 (ALT 250 FOR IP): Performed by: STUDENT IN AN ORGANIZED HEALTH CARE EDUCATION/TRAINING PROGRAM

## 2020-12-27 PROCEDURE — 2500000003 HC RX 250 WO HCPCS: Performed by: INTERNAL MEDICINE

## 2020-12-27 PROCEDURE — 6360000002 HC RX W HCPCS

## 2020-12-27 PROCEDURE — G0378 HOSPITAL OBSERVATION PER HR: HCPCS

## 2020-12-27 PROCEDURE — 82962 GLUCOSE BLOOD TEST: CPT

## 2020-12-27 PROCEDURE — 96375 TX/PRO/DX INJ NEW DRUG ADDON: CPT

## 2020-12-27 PROCEDURE — 99233 SBSQ HOSP IP/OBS HIGH 50: CPT | Performed by: INTERNAL MEDICINE

## 2020-12-27 PROCEDURE — 6370000000 HC RX 637 (ALT 250 FOR IP)

## 2020-12-27 PROCEDURE — 85025 COMPLETE CBC W/AUTO DIFF WBC: CPT

## 2020-12-27 PROCEDURE — 96372 THER/PROPH/DIAG INJ SC/IM: CPT

## 2020-12-27 PROCEDURE — 80048 BASIC METABOLIC PNL TOTAL CA: CPT

## 2020-12-27 PROCEDURE — 36415 COLL VENOUS BLD VENIPUNCTURE: CPT

## 2020-12-27 RX ORDER — CALCIUM ACETATE 667 MG/1
667 CAPSULE ORAL
Status: DISCONTINUED | OUTPATIENT
Start: 2020-12-27 | End: 2020-12-29 | Stop reason: HOSPADM

## 2020-12-27 RX ORDER — GABAPENTIN 100 MG/1
200 CAPSULE ORAL 3 TIMES DAILY
Status: DISCONTINUED | OUTPATIENT
Start: 2020-12-27 | End: 2020-12-29 | Stop reason: HOSPADM

## 2020-12-27 RX ORDER — LOPERAMIDE HYDROCHLORIDE 2 MG/1
2 CAPSULE ORAL 4 TIMES DAILY PRN
Status: DISCONTINUED | OUTPATIENT
Start: 2020-12-27 | End: 2020-12-29 | Stop reason: HOSPADM

## 2020-12-27 RX ORDER — LOPERAMIDE HYDROCHLORIDE 2 MG/1
CAPSULE ORAL
Status: COMPLETED
Start: 2020-12-27 | End: 2020-12-27

## 2020-12-27 RX ORDER — FENTANYL CITRATE 50 UG/ML
25 INJECTION, SOLUTION INTRAMUSCULAR; INTRAVENOUS
Status: DISCONTINUED | OUTPATIENT
Start: 2020-12-27 | End: 2020-12-29 | Stop reason: HOSPADM

## 2020-12-27 RX ORDER — BUMETANIDE 1 MG/1
2 TABLET ORAL 2 TIMES DAILY
Status: DISCONTINUED | OUTPATIENT
Start: 2020-12-27 | End: 2020-12-29 | Stop reason: HOSPADM

## 2020-12-27 RX ORDER — GABAPENTIN 100 MG/1
CAPSULE ORAL
Status: COMPLETED
Start: 2020-12-27 | End: 2020-12-27

## 2020-12-27 RX ORDER — FENTANYL CITRATE 50 UG/ML
INJECTION, SOLUTION INTRAMUSCULAR; INTRAVENOUS
Status: COMPLETED
Start: 2020-12-27 | End: 2020-12-27

## 2020-12-27 RX ADMIN — LEVOTHYROXINE SODIUM 75 MCG: 75 TABLET ORAL at 09:14

## 2020-12-27 RX ADMIN — FENTANYL CITRATE 25 MCG: 50 INJECTION INTRAMUSCULAR; INTRAVENOUS at 23:04

## 2020-12-27 RX ADMIN — DILTIAZEM HYDROCHLORIDE 240 MG: 240 CAPSULE, COATED, EXTENDED RELEASE ORAL at 09:14

## 2020-12-27 RX ADMIN — ATORVASTATIN CALCIUM 40 MG: 20 TABLET, FILM COATED ORAL at 20:44

## 2020-12-27 RX ADMIN — HEPARIN SODIUM 5000 UNITS: 5000 INJECTION INTRAVENOUS; SUBCUTANEOUS at 20:44

## 2020-12-27 RX ADMIN — Medication 10 ML: at 00:51

## 2020-12-27 RX ADMIN — BUMETANIDE 2 MG: 1 TABLET ORAL at 20:44

## 2020-12-27 RX ADMIN — Medication 10 ML: at 09:15

## 2020-12-27 RX ADMIN — MIRTAZAPINE 15 MG: 15 TABLET, FILM COATED ORAL at 20:44

## 2020-12-27 RX ADMIN — HEPARIN SODIUM 5000 UNITS: 5000 INJECTION INTRAVENOUS; SUBCUTANEOUS at 06:39

## 2020-12-27 RX ADMIN — Medication 10 ML: at 20:48

## 2020-12-27 RX ADMIN — INSULIN LISPRO 3 UNITS: 100 INJECTION, SOLUTION INTRAVENOUS; SUBCUTANEOUS at 20:45

## 2020-12-27 RX ADMIN — GABAPENTIN 200 MG: 100 CAPSULE ORAL at 20:44

## 2020-12-27 RX ADMIN — FENTANYL CITRATE 25 MCG: 50 INJECTION INTRAMUSCULAR; INTRAVENOUS at 14:10

## 2020-12-27 RX ADMIN — INSULIN GLARGINE 6 UNITS: 100 INJECTION, SOLUTION SUBCUTANEOUS at 20:45

## 2020-12-27 RX ADMIN — GABAPENTIN 200 MG: 100 CAPSULE ORAL at 14:09

## 2020-12-27 RX ADMIN — METOPROLOL TARTRATE 100 MG: 25 TABLET, FILM COATED ORAL at 20:44

## 2020-12-27 RX ADMIN — ONDANSETRON HYDROCHLORIDE 4 MG: 2 SOLUTION INTRAMUSCULAR; INTRAVENOUS at 23:04

## 2020-12-27 RX ADMIN — LOPERAMIDE HYDROCHLORIDE 2 MG: 2 CAPSULE ORAL at 14:09

## 2020-12-27 RX ADMIN — GABAPENTIN 100 MG: 100 CAPSULE ORAL at 08:52

## 2020-12-27 RX ADMIN — METOPROLOL TARTRATE 100 MG: 25 TABLET, FILM COATED ORAL at 08:52

## 2020-12-27 RX ADMIN — INSULIN LISPRO 6 UNITS: 100 INJECTION, SOLUTION INTRAVENOUS; SUBCUTANEOUS at 18:17

## 2020-12-27 RX ADMIN — INSULIN GLARGINE 8 UNITS: 100 INJECTION, SOLUTION SUBCUTANEOUS at 08:29

## 2020-12-27 RX ADMIN — HEPARIN SODIUM 5000 UNITS: 5000 INJECTION INTRAVENOUS; SUBCUTANEOUS at 00:55

## 2020-12-27 RX ADMIN — SODIUM ZIRCONIUM CYCLOSILICATE 5 G: 5 POWDER, FOR SUSPENSION ORAL at 09:13

## 2020-12-27 RX ADMIN — INSULIN HUMAN 4 UNITS: 100 INJECTION, SOLUTION PARENTERAL at 08:24

## 2020-12-27 RX ADMIN — FENTANYL CITRATE 25 MCG: 50 INJECTION INTRAMUSCULAR; INTRAVENOUS at 20:57

## 2020-12-27 RX ADMIN — GABAPENTIN 100 MG: 100 CAPSULE ORAL at 00:54

## 2020-12-27 RX ADMIN — CALCIUM ACETATE 667 MG: 667 CAPSULE ORAL at 17:32

## 2020-12-27 RX ADMIN — BUMETANIDE 2 MG: 1 TABLET ORAL at 09:14

## 2020-12-27 RX ADMIN — HEPARIN SODIUM 5000 UNITS: 5000 INJECTION INTRAVENOUS; SUBCUTANEOUS at 14:09

## 2020-12-27 RX ADMIN — MIRTAZAPINE 15 MG: 15 TABLET, FILM COATED ORAL at 01:28

## 2020-12-27 RX ADMIN — INSULIN LISPRO 2 UNITS: 100 INJECTION, SOLUTION INTRAVENOUS; SUBCUTANEOUS at 17:35

## 2020-12-27 RX ADMIN — METOLAZONE 5 MG: 2.5 TABLET ORAL at 09:13

## 2020-12-27 ASSESSMENT — PAIN DESCRIPTION - LOCATION: LOCATION: LEG

## 2020-12-27 ASSESSMENT — PAIN DESCRIPTION - FREQUENCY: FREQUENCY: CONTINUOUS

## 2020-12-27 ASSESSMENT — PAIN SCALES - GENERAL
PAINLEVEL_OUTOF10: 7
PAINLEVEL_OUTOF10: 7

## 2020-12-27 ASSESSMENT — PAIN DESCRIPTION - DESCRIPTORS: DESCRIPTORS: ACHING;DULL;DISCOMFORT

## 2020-12-27 ASSESSMENT — PAIN DESCRIPTION - PAIN TYPE: TYPE: CHRONIC PAIN

## 2020-12-27 NOTE — ED NOTES
Abnormal labs reported to Dr Andrea Mckeon. Continuing dialysis no further orders.       Danielle Berry, RN  12/27/20 3661

## 2020-12-27 NOTE — ED NOTES
Dialysis contacted patient will start dialysis in ER at around midnight.       Carlo Schmidt RN  12/26/20 0095

## 2020-12-27 NOTE — ED NOTES
Lab called with abnormal creatine result at this time, Dr Rogelio Tobias called and is aware.       Bennett Ortiz RN  12/26/20 2035

## 2020-12-27 NOTE — H&P
[address] [place], [place] [address]ist Group   History and Physical      CHIEF COMPLAINT: Generalized body pain. History of Present Illness:  31 y.o. female with a history of diabetes mellitus type 1, end-stage renal disease on peritoneal dialysis, hyperlipidemia, diabetic neuropathy, depression and hypothyroidism presents with generalized body pain for 1 day. Patient was extremely sleepy during my encounter, very limited history obtained from the patient at the bedside. She felt very pain for that reason came to ER. She denies any fever, chest pain or any shortness of breath. In ER her potassium found elevated. She received her peritoneal dialysis yesterday. In ER her vitals shows blood pressure 102/66, pulse 72, respiration 14 and temperature 97.4 °F.  Her labs in ER shows sodium 135, potassium 6.7 and sugar 105. Her albumin 2.8 alk phosphatase 161. Informant(s) for H&P: ER physician and patient    REVIEW OF SYSTEMS:  no fevers, chills, cp, sob, n/v, ha, vision/hearing changes, wt changes, hot/cold flashes, other open skin lesions, diarrhea, constipation, dysuria/hematuria unless noted in HPI. Complete ROS performed with the patient and is otherwise negative.       PMH:  Past Medical History:   Diagnosis Date    Acute congestive heart failure ([address]     BC (acute kidney injury) ([address] 10/1/2019    Cephalgia 10/9/2019    Chronic kidney disease     Depression     Diabetes mellitus ([address]     Diabetic ketoacidosis ([address] 8/27/2011    Hemodialysis patient ([address]     History of blood transfusion 11/2019    Hyperlipidemia 10/8/2020    Hypothyroidism 10/8/2020    Iron deficiency anemia 10/1/2019    MDRO (multiple drug resistant organisms) resistance     MRSA (methicillin resistant Staphylococcus aureus)     back wound abcess    Non compliance w medication regimen 3/30/2016    Other disorders of kidney and ureter     Pregnancy 3/30/2016    16 weeks    Seizure ([address] 11/20/2020  Severe pre-eclampsia in third trimester 2016       Surgical History:  Past Surgical History:   Procedure Laterality Date    BACK SURGERY      abscess     SECTION      x2    CHOLECYSTECTOMY, LAPAROSCOPIC N/A 2019    CHOLECYSTECTOMY [facility]COPIC [facility] N/A 2018    COLONOSCOPY WITH BIOPSY performed by Betty Mcfadden MD at [address] N/A 2018    COLONOSCOPY WITH BIOPSY performed by Laura Larios MD at [address]  2012    EF 57%    ECHO COMPL W DOP COLOR FLOW  6/10/2013         EMBOLECTOMY N/A 2020    [address], [place]a [place], [place] -- REQS ROOM 3 performed by Dao Gold MD at [address] N/A 2020    LAPAROSCOPIC INSERTION PERITONEAL DIALYSIS CATHETER performed by Marcy Murphy MD at [address] [facility] ESOPHAGOGASTRODUODENOSCOPY TRANSORAL DIAGNOSTIC N/A 2018    EGD ESOPHAGOGASTRODUODENOSCOPY performed by Betty Mcfadden MD at [address] [facility] TRANSESOPHAGEAL ECHOCARDIOGRAM N/A 10/19/2020    TRANSESOPHAGEAL ECHOCARDIOGRAM WITH BUBBLE STUDY performed by Garry Larkin MD at [address] [facility] [address] [address]  2018    UPPER GASTROINTESTINAL ENDOSCOPY  2018    EGD BIOPSY performed by Laura Larios MD at [facility] 10/11/2019    EGD ESOPHAGOGASTRODUODENOSCOPY performed by Carmen Gonzalez DO at [place] [address]       Medications Prior to Admission:    Prior to Admission medications    Medication Sig Start Date End Date Taking? Authorizing Provider   insulin aspart (NOVOLOG) 100 UNIT/ML injection vial 2 Units 3 times daily (before meals) Plus sliding scale 20  Yes Historical Provider, MD   gabapentin (NEURONTIN) 100 MG capsule Take 2 capsules by mouth 3 times daily for 60 days.  12/15/20 2/13/21 Yes Fernie Dela Cruz MD dilTIAZem (CARDIZEM CD) 240 MG extended release capsule Take 1 capsule by mouth daily 12/15/20  Yes Christine Fairchild MD   insulin glargine (LANTUS) 100 UNIT/ML injection vial Inject 8 Units into the skin every morning 12/15/20  Yes Christine Fairchild MD   mirtazapine (REMERON) 15 MG tablet Take 1 tablet by mouth nightly 12/15/20  Yes Christine Fairchild MD   insulin lispro (HUMALOG) 100 UNIT/ML injection vial 50 units via insulin pump daily 12/14/20  Yes Hiren Nazario MD   blood glucose test strips (CONTOUR NEXT TEST) strip 1 each by In Vitro route 5 times daily As needed.  12/14/20  Yes Zeke Anand MD   insulin glargine (LANTUS) 100 UNIT/ML injection vial Inject 6 Units into the skin nightly  Patient taking differently: Inject 6 Units into the skin nightly Taking 10 unit in am and 6 units in pm 11/22/20  Yes Annamaria Hill MD   bumetanide (BUMEX) 1 MG tablet Take 2 mg by mouth daily   Yes Historical Provider, MD   metoclopramide (REGLAN) 5 MG tablet Take 5 mg by mouth 4 times daily (before meals and nightly)   Yes Historical Provider, MD   metoprolol (LOPRESSOR) 100 MG tablet Take 100 mg by mouth 2 times daily   Yes Historical Provider, MD   potassium chloride (KLOR-CON M) 20 MEQ extended release tablet Take 20 mEq by mouth daily   Yes Historical Provider, MD   levothyroxine (SYNTHROID) 75 MCG tablet Take 1 tablet by mouth every morning (before breakfast) 11/12/20  Yes Yaneht Hubbard MD   metOLazone (ZAROXOLYN) 5 MG tablet Take 5 mg by mouth daily   Yes Historical Provider, MD   cloNIDine (CATAPRES) 0.1 MG tablet Take 0.1 mg by mouth 2 times daily as needed for High Blood Pressure   Yes Historical Provider, MD   atorvastatin (LIPITOR) 40 MG tablet Take 1 tablet by mouth nightly 9/3/20  Yes Christine Fairchild MD       Allergies:    Cefepime and Toradol [ketorolac tromethamine]    Social History: reports that she has been smoking cigarettes. She has a 3.00 pack-year smoking history. She uses smokeless tobacco. She reports that she does not drink alcohol or use drugs. Family History:   family history includes Asthma in her brother and mother; Diabetes in her mother; High Blood Pressure in her father and mother; Hypertension in her mother. PHYSICAL EXAM:  Vitals:  /66   Pulse 72   Temp 97.4 °F (36.3 °C) (Temporal)   Resp 14   Ht 5' 4\" (1.626 m)   Wt 144 lb (65.3 kg)   SpO2 97%   BMI 24.72 kg/m²     General Appearance: alert and oriented to person, place and time and in no acute distress  Skin: warm and dry  Head: normocephalic and atraumatic  Eyes: pupils equal, round, and reactive to light, extraocular eye movements intact, conjunctivae normal  Neck: neck supple and non tender without mass   Pulmonary/Chest: clear to auscultation bilaterally- no wheezes, rales or rhonchi, normal air movement, no respiratory distress  Cardiovascular: normal rate, normal S1 and S2 and no carotid bruits  Abdomen: soft, non-tender, non-distended, normal bowel sounds, no masses or organomegaly  Extremities: no cyanosis, no clubbing and no edema  Neurologic: no cranial nerve deficit and speech normal    LABS:  Recent Labs     12/26/20  1058 12/26/20  1105 12/26/20  1305 12/26/20  1647   NA  --  135  --   --    K  --  5.9* 5.4* 6.7*   CL  --  95*  --   --    CO2  --  25  --   --    BUN  --  46*  --   --    CREATININE  --  8.5*  --   --    GLUCOSE 212 217*  --   --    CALCIUM  --  8.9  --   --        Recent Labs     12/26/20  1105   WBC 5.8   RBC 4.35   HGB 12.5   HCT 38.8   MCV 89.2   MCH 28.7   MCHC 32.2   RDW 14.3      MPV 10.4       No results for input(s): POCGLU in the last 72 hours.         Radiology: Xr Chest Portable    Result Date: 12/26/2020 EXAMINATION: ONE XRAY VIEW OF THE CHEST 12/26/2020 10:48 am COMPARISON: Comparison is dated December 13, 2020 HISTORY: ORDERING SYSTEM PROVIDED HISTORY: weakness TECHNOLOGIST PROVIDED HISTORY: Reason for exam:->weakness FINDINGS: The lungs are without acute focal process. There is no effusion or pneumothorax. The cardiomediastinal silhouette is without acute process. The osseous structures are without acute process. No acute process. EKG: Normal sinus rhythm. ASSESSMENT:      Active Problems:    Hyperkalemia  Resolved Problems:    * No resolved hospital problems. *      PLAN:    1. Hyperkalemia: She received calcium gluconate, insulin, bicarbonate. Her EKG did not show any peaked T waves, follow-up BMP at 11 PM, telemetry monitoring. 2.  Diabetes mellitus: Continue her Lantus 8 units in the morning and 600 in the evening, and insulin coverage while she is in hospital.  3.  Hypothyroidism: Continue levothyroxine 70 make program p.o. daily. 4.  Depression: Continue mirtazapine 10 mg p.o. daily. 5.  Hyperlipidemia: Continue Lipitor 40 mg p.o. daily. 6.  End-stage renal disease: Nephrology consult to have peritoneal dialysis tonight, continue her Bumex and metolazone. Code Status: Full  DVT prophylaxis: Heparin subcu. NOTE: This report was transcribed using voice recognition software.  Every effort was made to ensure accuracy; however, inadvertent computerized transcription errors may be present.     Electronically signed by Sage Hill MD on 12/26/2020 at 8:55 PM

## 2020-12-27 NOTE — PROGRESS NOTES
CCPD COMPLETED WITHOUT PROBLEM. PT DENIES C/O AT THIS TIME. CLEAR YELLOW EFFLUENT NOTED. DRESSING INTACT AND NO DRAINAGE. TOT TIME 10:21 ; ; TOT VOL 8006; DWELL TIME LOST 00:26.        BP (!) 170/109   Pulse 97   Temp 97.6 °F (36.4 °C)   Resp 22   Ht 5' 4\" (1.626 m)   Wt 144 lb (65.3 kg)   SpO2 100%   BMI 24.72 kg/m²

## 2020-12-27 NOTE — PROGRESS NOTES
[address] [place], [place] [address]ist   Progress Note    Admitting Date and Time: 12/26/2020  9:56 AM  Admit Dx: Hyperkalemia [E87.5]    Subjective/interval history:    Pt admitted with generalized body aches last night. She also notes pain in her extremities that is very sharp, been worsening over the last several weeks to months. She is on chronic peritoneal dialysis. She states she was started on gabapentin as an outpatient, but this is not provide her much relief. Patient was found to have hyperkalemia with potassium of 6.7 in the emergency department. Today she feels that her symptoms are only slightly improved. She also complains of diarrhea, however this is chronic. ROS: denies fever, chills, cp, sob, n/v, HA unless stated above.      gabapentin  200 mg Oral TID    insulin lispro  2 Units Subcutaneous TID WC    sodium zirconium cyclosilicate  5 g Oral TID    atorvastatin  40 mg Oral Nightly    bumetanide  2 mg Oral Daily    dilTIAZem  240 mg Oral Daily    insulin glargine  6 Units Subcutaneous Nightly    insulin glargine  8 Units Subcutaneous QAM    levothyroxine  75 mcg Oral QAM AC    metOLazone  5 mg Oral Daily    metoprolol  100 mg Oral BID    mirtazapine  15 mg Oral Nightly    sodium chloride flush  10 mL Intravenous 2 times per day    heparin (porcine)  5,000 Units Subcutaneous 3 times per day         loperamide, 2 mg, 4x Daily PRN      fentanNYL, 25 mcg, Q1H PRN      glucose, 15 g, PRN      dextrose, 12.5 g, PRN      glucagon (rDNA), 1 mg, PRN      dextrose, 100 mL/hr, PRN      sodium chloride flush, 10 mL, PRN      promethazine, 12.5 mg, Q6H PRN    Or      ondansetron, 4 mg, Q6H PRN      polyethylene glycol, 17 g, Daily PRN      acetaminophen, 650 mg, Q6H PRN    Or      acetaminophen, 650 mg, Q6H PRN      cloNIDine, 0.1 mg, BID PRN         Objective: BP (!) 170/109   Pulse 97   Temp 97.6 °F (36.4 °C)   Resp 22   Ht 5' 4\" (1.626 m)   Wt 144 lb (65.3 kg)   SpO2 100%   BMI 24.72 kg/m²   General Appearance: alert and oriented to person, place and time and in no acute distress  Skin: warm and dry  Head: normocephalic and atraumatic  Eyes: pupils equal, round, and reactive to light, extraocular eye movements intact, conjunctivae normal  Neck: neck supple and non tender without mass   Pulmonary/Chest: clear to auscultation bilaterally- no wheezes, rales or rhonchi, normal air movement, no respiratory distress  Cardiovascular: normal rate, normal S1 and S2 and no carotid bruits  Abdomen: soft, non-tender, non-distended, normal bowel sounds, no masses or organomegaly. Peritoneal dialysis catheter in place  Extremities: no cyanosis, no clubbing and no edema  Neurologic: no cranial nerve deficit and speech normal      Recent Labs     12/27/20  0550 12/27/20  0550 12/27/20  0855 12/27/20  1102 12/27/20  1155 12/27/20  1230   *  --  126*  --  130*  --    K 5.0  --  6.4*  --  4.6  --    *  --  96*  --  95*  --    CO2 17*  --  19*  --  20*  --    BUN 31*  --  40*  --  41*  --    CREATININE 5.2*  --  7.2*  --  7.2*  --    GLUCOSE 281*   < > 434* 497 476* 444   CALCIUM 5.1*  --  7.4*  --  7.7*  --     < > = values in this interval not displayed.        Recent Labs     12/26/20  1105   ALKPHOS 161*   PROT 6.7   LABALBU 2.8*   BILITOT <0.2   AST 45*   ALT 27       Recent Labs     12/26/20  1105 12/27/20  0545   WBC 5.8 3.7*   RBC 4.35 3.61   HGB 12.5 10.4*   HCT 38.8 30.9*   MCV 89.2 85.6   MCH 28.7 28.8   MCHC 32.2 33.7   RDW 14.3 14.1    191   MPV 10.4 10.9       CBC:   Lab Results   Component Value Date    WBC 3.7 12/27/2020    RBC 3.61 12/27/2020    HGB 10.4 12/27/2020    HCT 30.9 12/27/2020    MCV 85.6 12/27/2020    MCH 28.8 12/27/2020    MCHC 33.7 12/27/2020    RDW 14.1 12/27/2020     12/27/2020    MPV 10.9 12/27/2020     BMP: NOTE: This report was transcribed using voice recognition software. Every effort was made to ensure accuracy; however, inadvertent computerized transcription errors may be present.      Electronically signed by Gerardo Dominguez DO on 12/27/2020 at 2:11 PM

## 2020-12-27 NOTE — ED NOTES
Glucose RR high then repeated in 368 bmp ordered per protocol and sent dr to be notified when resulted.       Ajith Nair RN  12/27/20 0328

## 2020-12-27 NOTE — CONSULTS
Department of Internal Medicine  Nephrology Attending Consult Note      Reason for Consult:  End-Stage Renal Disease  Requesting Physician:  Dr. Sarthak Simental:  Generalized body aches    History Obtained From:  patient, electronic medical record    HISTORY OF PRESENT ILLNESS:  Suleiman Britton is a 29-year-old female with history of ESRD secondary to hypertensive nephrosclerosis diabetic nephropathy, on Peritoneal Dialysis 10 liters/day with 4 manual exchanges of 2 liters, 1.5% every 9 hours/Cycle, last fill 2L of 2.5%, with severe proteinuria, poorly controlled type I DM with multiple admissions for DKA, gastroparesis, HTN,  coagulase negative Staphylococcus bacteremia due to Mediport infection, she has well was found to have a right atrial mass (clot 2.4 x 0.4 cm) which was removed on November 6 and culture was negative, 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 In the ER her potassium was 5.9 mEq/L which was hemolyzed, repeated was 5.4 mEq/L.       Past Medical History:        Diagnosis Date    Acute congestive heart failure ([address]     BC (acute kidney injury) ([address] 10/1/2019    Cephalgia 10/9/2019    Chronic kidney disease     Depression     Diabetes mellitus ([address]     Diabetic ketoacidosis ([address] 8/27/2011    Hemodialysis patient ([address]     History of blood transfusion 11/2019    Hyperlipidemia 10/8/2020    Hypothyroidism 10/8/2020    Iron deficiency anemia 10/1/2019    MDRO (multiple drug resistant organisms) resistance     MRSA (methicillin resistant Staphylococcus aureus)     back wound abcess    Non compliance w medication regimen 3/30/2016    Other disorders of kidney and ureter     Pregnancy 3/30/2016    16 weeks    Seizure ([address] 11/20/2020  Severe pre-eclampsia in third trimester 2016     Past Surgical History:        Procedure Laterality Date    BACK SURGERY      abscess     SECTION      x2    CHOLECYSTECTOMY, LAPAROSCOPIC N/A 2019    CHOLECYSTECTOMY LAPAROSCOPIC performed by Damaris Boudreaux MD at [address] N/A 2018    COLONOSCOPY WITH BIOPSY performed by Junior Meyers MD at [address] N/A 2018    COLONOSCOPY WITH BIOPSY performed by Yolis Stokes MD at [address]  2012    EF 57%    ECHO COMPL W DOP COLOR FLOW  6/10/2013         EMBOLECTOMY N/A 2020    [address] [facility] [address], [place]at Nain, [place] -- REQS ROOM 3 performed by Nicole Cruz MD at [facility] N/A 2020    LAPAROSCOPIC INSERTION PERITONEAL DIALYSIS CATHETER performed by Omer Gupta MD at [facility] ESOPHAGOGASTRODUODENOSCOPY TRANSORAL DIAGNOSTIC N/A 2018    EGD ESOPHAGOGASTRODUODENOSCOPY performed by Junior Meyers MD at [address] TRANSESOPHAGEAL ECHOCARDIOGRAM N/A 10/19/2020    TRANSESOPHAGEAL ECHOCARDIOGRAM WITH BUBBLE STUDY performed by Caity Wright MD at [address] [facility] [address] [address]  2018    UPPER GASTROINTESTINAL ENDOSCOPY  2018    EGD BIOPSY performed by Yolis Stokes MD at [facility] 10/11/2019    EGD ESOPHAGOGASTRODUODENOSCOPY performed by Bill West DO at [place] [address]     Current Medications:    Current Facility-Administered Medications: loperamide (IMODIUM) capsule 2 mg, 2 mg, Oral, 4x Daily PRN  fentaNYL (SUBLIMAZE) injection 25 mcg, 25 mcg, Intravenous, Q1H PRN  gabapentin (NEURONTIN) capsule 200 mg, 200 mg, Oral, TID  insulin lispro (HUMALOG) injection vial 2 Units, 2 Units, Subcutaneous, TID WC  glucose (GLUTOSE) 40 % oral gel 15 g, 15 g, Oral, PRN  dextrose 50 % IV solution, 12.5 g, Intravenous, PRN glucagon (rDNA) injection 1 mg, 1 mg, Intramuscular, PRN  dextrose 5 % solution, 100 mL/hr, Intravenous, PRN  sodium zirconium cyclosilicate (LOKELMA) oral suspension 5 g, 5 g, Oral, TID  atorvastatin (LIPITOR) tablet 40 mg, 40 mg, Oral, Nightly  bumetanide (BUMEX) tablet 2 mg, 2 mg, Oral, Daily  dilTIAZem (CARDIZEM CD) extended release capsule 240 mg, 240 mg, Oral, Daily  insulin glargine (LANTUS) injection vial 6 Units, 6 Units, Subcutaneous, Nightly  insulin glargine (LANTUS) injection vial 8 Units, 8 Units, Subcutaneous, QAM  levothyroxine (SYNTHROID) tablet 75 mcg, 75 mcg, Oral, QAM AC  metOLazone (ZAROXOLYN) tablet 5 mg, 5 mg, Oral, Daily  metoprolol tartrate (LOPRESSOR) tablet 100 mg, 100 mg, Oral, BID  mirtazapine (REMERON) tablet 15 mg, 15 mg, Oral, Nightly  sodium chloride flush 0.9 % injection 10 mL, 10 mL, Intravenous, 2 times per day  sodium chloride flush 0.9 % injection 10 mL, 10 mL, Intravenous, PRN  heparin (porcine) injection 5,000 Units, 5,000 Units, Subcutaneous, 3 times per day  promethazine (PHENERGAN) tablet 12.5 mg, 12.5 mg, Oral, Q6H PRN **OR** ondansetron (ZOFRAN) injection 4 mg, 4 mg, Intravenous, Q6H PRN  polyethylene glycol (GLYCOLAX) packet 17 g, 17 g, Oral, Daily PRN  acetaminophen (TYLENOL) tablet 650 mg, 650 mg, Oral, Q6H PRN **OR** acetaminophen (TYLENOL) suppository 650 mg, 650 mg, Rectal, Q6H PRN  cloNIDine (CATAPRES) tablet 0.1 mg, 0.1 mg, Oral, BID PRN  Allergies:  Cefepime and Toradol [ketorolac tromethamine]    Social History:    TOBACCO:   reports that she has been smoking cigarettes. She has a 3.00 pack-year smoking history. She uses smokeless tobacco.  ETOH:   reports no history of alcohol use.     Family History:       Problem Relation Age of Onset   [facility] Asthma Mother     Hypertension Mother     High Blood Pressure Mother     Diabetes Mother     Asthma Brother     High Blood Pressure Father      REVIEW OF SYSTEMS:      CONSTITUTIONAL:  negative for  fevers and chills EYES:  negative for  double vision and blurred vision  HEENT:  negative for  hearing loss and epistaxis  RESPIRATORY:  negative for  dry cough, dyspnea and wheezing  CARDIOVASCULAR:  negative for  chest pain, dyspnea  GASTROINTESTINAL:  positive for nausea, vomiting and abdominal pain  GENITOURINARY:  positive for frequency and dysuria  INTEGUMENT/BREAST:  negative for rash and skin lesion(s)  HEMATOLOGIC/LYMPHATIC:  negative for easy bruising and lymphadenopathy  ALLERGIC/IMMUNOLOGIC:  negative for recurrent infections and urticaria  ENDOCRINE:  negative for tremor and weight changes  MUSCULOSKELETAL:  Positive for generalized muscle aches  NEUROLOGICAL:  negative for headaches and dizziness    PHYSICAL EXAM:      Vitals:    VITALS:  BP (!) 131/94   Pulse 88   Temp 97.9 °F (36.6 °C) (Oral)   Resp 20   Ht 5' 4\" (1.626 m)   Wt 144 lb (65.3 kg)   SpO2 98%   BMI 24.72 kg/m²   24HR INTAKE/OUTPUT:      Intake/Output Summary (Last 24 hours) at 12/27/2020 1622  Last data filed at 12/27/2020 1145  Gross per 24 hour   Intake    Output 624 ml   Net -624 ml     PD Catheter Exam:  PD catheter exit site clean    Constitutional:  disoriented  HEENT:  Normocephalic, PERRL  Respiratory:  CTA bilaterally  Cardiovascular/Edema:  RRR, S1/S2  Gastrointestinal:  Soft, PD catheter exit site clean   Neurologic:  Nonfocal, AVELAR  Skin:  Warm, dry, no lesions  Other:  No edema       DATA:    CBC:   Lab Results   Component Value Date    WBC 3.7 12/27/2020    RBC 3.61 12/27/2020    HGB 10.4 12/27/2020    HCT 30.9 12/27/2020    MCV 85.6 12/27/2020    MCH 28.8 12/27/2020    MCHC 33.7 12/27/2020    RDW 14.1 12/27/2020     12/27/2020    MPV 10.9 12/27/2020     CMP:    Lab Results   Component Value Date     12/27/2020    K 4.6 12/27/2020    K 6.4 12/27/2020    CL 95 12/27/2020    CO2 20 12/27/2020    BUN 41 12/27/2020    CREATININE 7.2 12/27/2020    GFRAA 8 12/27/2020    LABGLOM 8 12/27/2020    GLUCOSE 444 12/27/2020 GLUCOSE 130 05/18/2012    PROT 6.7 12/26/2020    LABALBU 2.8 12/26/2020    LABALBU 4.1 05/18/2012    CALCIUM 7.7 12/27/2020    BILITOT <0.2 12/26/2020    ALKPHOS 161 12/26/2020    AST 45 12/26/2020    ALT 27 12/26/2020     Magnesium:    Lab Results   Component Value Date    MG 1.9 12/26/2020     Phosphorus:    Lab Results   Component Value Date    PHOS 7.4 12/26/2020     Radiology Review:      CXR 12/27/2020   No acute process. IMPRESSION/RECOMMENDATIONS:      Vincenzo Dixon is a 28 year-old female with history of ESRD secondary to hypertensive nephrosclerosis diabetic nephropathy, on Peritoneal Dialysis 10 liters/day with 4 manual exchanges of 2 liters, 1.5% every 9 hours/Cycle, last fill 2L of 2.5%, with severe proteinuria, poorly controlled type I DM with multiple admissions for DKA, HFpEF 60-65%, gastroparesis, HTN,  coagulase negative Staphylococcus bacteremia due to Mediport infection, she has well was found to have a right atrial mass (clot 2.4 x 0.4 cm) which was removed on November 6 and culture was negative, 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 In the ER her potassium was 5.9 mEq/L which was hemolyzed, repeated was 5.4 mEq/L.    1. ESRD on peritoneal dialysis (CCPD), to continue CCPD while in hospital.  2. Hyperkalemia, resolved  3. Hyponatremia, mainly due to component of translocation due to hyperglycemia  4. HTN, on diltiazem, metoprolol  5. HFpEF 60-65%, compensated  6. Generalized muscle aches, resolved, blood cultures so far no growth, respiratory panel with Covid negative. Possibly statin related? 7. Type 1 DM, poorly controlled, currently under treatment for insulin pump  8.  Diabetic peripheral neuropathy, on gabapentin 200 mg 3 times daily 9. MBD of CKD, on no binders, to start calcium acetate  10. anemia of CKD, hold MARIA TERESA for hemoglobin >10    Plan:    · Continue CCPD, change prescription to 4 exchanges of 2 L each of 1.5% dextrose over 9 hours with last fill of 2 L of 2.5%) total 10 L)  · Increase Bumex to 2 mg twice daily  · Continue Zaroxolyn 5 mg daily  · Start calcium acetate 667 mg 1 tablet 3 times daily  · Low potassium diet  · Marshal Lee    thank you very much Dr. Robyn Carrasco for allowing us to participate in the care of Mrs. Elida Cullen

## 2020-12-27 NOTE — PROGRESS NOTES
ccpd initiated aseptically per p/p without difficulty per orders. Dressing to pd cath site changed. No signs of infection. Appears clean and dry. Pt will be on cycler for at least 10 hrs.

## 2020-12-28 LAB
ANION GAP SERPL CALCULATED.3IONS-SCNC: 12 MMOL/L (ref 7–16)
ANION GAP SERPL CALCULATED.3IONS-SCNC: 13 MMOL/L (ref 7–16)
BASOPHILS ABSOLUTE: 0.07 E9/L (ref 0–0.2)
BASOPHILS RELATIVE PERCENT: 1.8 % (ref 0–2)
BUN BLDV-MCNC: 45 MG/DL (ref 6–20)
BUN BLDV-MCNC: 47 MG/DL (ref 6–20)
CALCIUM SERPL-MCNC: 7.7 MG/DL (ref 8.6–10.2)
CALCIUM SERPL-MCNC: 8 MG/DL (ref 8.6–10.2)
CHLORIDE BLD-SCNC: 94 MMOL/L (ref 98–107)
CHLORIDE BLD-SCNC: 96 MMOL/L (ref 98–107)
CO2: 24 MMOL/L (ref 22–29)
CO2: 25 MMOL/L (ref 22–29)
CREAT SERPL-MCNC: 7.4 MG/DL (ref 0.5–1)
CREAT SERPL-MCNC: 7.4 MG/DL (ref 0.5–1)
EOSINOPHILS ABSOLUTE: 0.43 E9/L (ref 0.05–0.5)
EOSINOPHILS RELATIVE PERCENT: 11 % (ref 0–6)
GFR AFRICAN AMERICAN: 8
GFR AFRICAN AMERICAN: 8
GFR NON-AFRICAN AMERICAN: 8 ML/MIN/1.73
GFR NON-AFRICAN AMERICAN: 8 ML/MIN/1.73
GLUCOSE BLD-MCNC: 154 MG/DL (ref 74–99)
GLUCOSE BLD-MCNC: 471 MG/DL (ref 74–99)
HCT VFR BLD CALC: 32 % (ref 34–48)
HEMOGLOBIN: 10.6 G/DL (ref 11.5–15.5)
IMMATURE GRANULOCYTES #: 0.03 E9/L
IMMATURE GRANULOCYTES %: 0.8 % (ref 0–5)
LYMPHOCYTES ABSOLUTE: 1.44 E9/L (ref 1.5–4)
LYMPHOCYTES RELATIVE PERCENT: 36.7 % (ref 20–42)
MCH RBC QN AUTO: 28.3 PG (ref 26–35)
MCHC RBC AUTO-ENTMCNC: 33.1 % (ref 32–34.5)
MCV RBC AUTO: 85.3 FL (ref 80–99.9)
METER GLUCOSE: 152 MG/DL (ref 74–99)
METER GLUCOSE: 199 MG/DL (ref 74–99)
METER GLUCOSE: 350 MG/DL (ref 74–99)
METER GLUCOSE: 421 MG/DL (ref 74–99)
MONOCYTES ABSOLUTE: 0.3 E9/L (ref 0.1–0.95)
MONOCYTES RELATIVE PERCENT: 7.7 % (ref 2–12)
NEUTROPHILS ABSOLUTE: 1.65 E9/L (ref 1.8–7.3)
NEUTROPHILS RELATIVE PERCENT: 42 % (ref 43–80)
PDW BLD-RTO: 13.6 FL (ref 11.5–15)
PLATELET # BLD: 245 E9/L (ref 130–450)
PMV BLD AUTO: 10.3 FL (ref 7–12)
POTASSIUM REFLEX MAGNESIUM: 6 MMOL/L (ref 3.5–5)
POTASSIUM SERPL-SCNC: 4.4 MMOL/L (ref 3.5–5)
RBC # BLD: 3.75 E12/L (ref 3.5–5.5)
SODIUM BLD-SCNC: 131 MMOL/L (ref 132–146)
SODIUM BLD-SCNC: 133 MMOL/L (ref 132–146)
WBC # BLD: 3.9 E9/L (ref 4.5–11.5)

## 2020-12-28 PROCEDURE — 85025 COMPLETE CBC W/AUTO DIFF WBC: CPT

## 2020-12-28 PROCEDURE — 6360000002 HC RX W HCPCS: Performed by: INTERNAL MEDICINE

## 2020-12-28 PROCEDURE — 6370000000 HC RX 637 (ALT 250 FOR IP): Performed by: INTERNAL MEDICINE

## 2020-12-28 PROCEDURE — 2060000000 HC ICU INTERMEDIATE R&B

## 2020-12-28 PROCEDURE — 2500000003 HC RX 250 WO HCPCS: Performed by: INTERNAL MEDICINE

## 2020-12-28 PROCEDURE — 36415 COLL VENOUS BLD VENIPUNCTURE: CPT

## 2020-12-28 PROCEDURE — 82962 GLUCOSE BLOOD TEST: CPT

## 2020-12-28 PROCEDURE — 2580000003 HC RX 258: Performed by: INTERNAL MEDICINE

## 2020-12-28 PROCEDURE — 99233 SBSQ HOSP IP/OBS HIGH 50: CPT | Performed by: INTERNAL MEDICINE

## 2020-12-28 PROCEDURE — 80048 BASIC METABOLIC PNL TOTAL CA: CPT

## 2020-12-28 RX ORDER — ROPINIROLE 0.25 MG/1
0.25 TABLET, FILM COATED ORAL NIGHTLY
Status: DISCONTINUED | OUTPATIENT
Start: 2020-12-28 | End: 2020-12-29 | Stop reason: HOSPADM

## 2020-12-28 RX ADMIN — DILTIAZEM HYDROCHLORIDE 240 MG: 240 CAPSULE, COATED, EXTENDED RELEASE ORAL at 09:06

## 2020-12-28 RX ADMIN — Medication 10 ML: at 22:36

## 2020-12-28 RX ADMIN — HEPARIN SODIUM 5000 UNITS: 5000 INJECTION INTRAVENOUS; SUBCUTANEOUS at 06:26

## 2020-12-28 RX ADMIN — INSULIN LISPRO 2 UNITS: 100 INJECTION, SOLUTION INTRAVENOUS; SUBCUTANEOUS at 17:44

## 2020-12-28 RX ADMIN — LOPERAMIDE HYDROCHLORIDE 2 MG: 2 CAPSULE ORAL at 23:28

## 2020-12-28 RX ADMIN — INSULIN LISPRO 1 UNITS: 100 INJECTION, SOLUTION INTRAVENOUS; SUBCUTANEOUS at 17:43

## 2020-12-28 RX ADMIN — METOLAZONE 5 MG: 2.5 TABLET ORAL at 09:06

## 2020-12-28 RX ADMIN — HEPARIN SODIUM 5000 UNITS: 5000 INJECTION INTRAVENOUS; SUBCUTANEOUS at 22:36

## 2020-12-28 RX ADMIN — MIRTAZAPINE 15 MG: 15 TABLET, FILM COATED ORAL at 22:35

## 2020-12-28 RX ADMIN — INSULIN LISPRO 6 UNITS: 100 INJECTION, SOLUTION INTRAVENOUS; SUBCUTANEOUS at 08:59

## 2020-12-28 RX ADMIN — METOPROLOL TARTRATE 100 MG: 25 TABLET, FILM COATED ORAL at 09:06

## 2020-12-28 RX ADMIN — CALCIUM ACETATE 667 MG: 667 CAPSULE ORAL at 09:07

## 2020-12-28 RX ADMIN — CALCIUM ACETATE 667 MG: 667 CAPSULE ORAL at 17:42

## 2020-12-28 RX ADMIN — FENTANYL CITRATE 25 MCG: 50 INJECTION INTRAMUSCULAR; INTRAVENOUS at 23:28

## 2020-12-28 RX ADMIN — LEVOTHYROXINE SODIUM 75 MCG: 75 TABLET ORAL at 06:25

## 2020-12-28 RX ADMIN — BUMETANIDE 2 MG: 1 TABLET ORAL at 22:35

## 2020-12-28 RX ADMIN — HEPARIN SODIUM 5000 UNITS: 5000 INJECTION INTRAVENOUS; SUBCUTANEOUS at 13:58

## 2020-12-28 RX ADMIN — INSULIN LISPRO 2 UNITS: 100 INJECTION, SOLUTION INTRAVENOUS; SUBCUTANEOUS at 09:02

## 2020-12-28 RX ADMIN — FENTANYL CITRATE 25 MCG: 50 INJECTION INTRAMUSCULAR; INTRAVENOUS at 15:39

## 2020-12-28 RX ADMIN — METOPROLOL TARTRATE 100 MG: 25 TABLET, FILM COATED ORAL at 22:35

## 2020-12-28 RX ADMIN — INSULIN GLARGINE 6 UNITS: 100 INJECTION, SOLUTION SUBCUTANEOUS at 22:36

## 2020-12-28 RX ADMIN — ATORVASTATIN CALCIUM 40 MG: 20 TABLET, FILM COATED ORAL at 22:35

## 2020-12-28 RX ADMIN — FENTANYL CITRATE 25 MCG: 50 INJECTION INTRAMUSCULAR; INTRAVENOUS at 08:00

## 2020-12-28 RX ADMIN — BUMETANIDE 2 MG: 1 TABLET ORAL at 09:07

## 2020-12-28 RX ADMIN — INSULIN GLARGINE 8 UNITS: 100 INJECTION, SOLUTION SUBCUTANEOUS at 08:59

## 2020-12-28 RX ADMIN — GABAPENTIN 200 MG: 100 CAPSULE ORAL at 09:07

## 2020-12-28 RX ADMIN — GABAPENTIN 200 MG: 100 CAPSULE ORAL at 13:58

## 2020-12-28 RX ADMIN — CALCIUM ACETATE 667 MG: 667 CAPSULE ORAL at 12:17

## 2020-12-28 RX ADMIN — GABAPENTIN 200 MG: 100 CAPSULE ORAL at 22:35

## 2020-12-28 RX ADMIN — INSULIN LISPRO 1 UNITS: 100 INJECTION, SOLUTION INTRAVENOUS; SUBCUTANEOUS at 12:20

## 2020-12-28 RX ADMIN — Medication 10 ML: at 09:07

## 2020-12-28 RX ADMIN — INSULIN LISPRO 3 UNITS: 100 INJECTION, SOLUTION INTRAVENOUS; SUBCUTANEOUS at 22:36

## 2020-12-28 RX ADMIN — INSULIN LISPRO 2 UNITS: 100 INJECTION, SOLUTION INTRAVENOUS; SUBCUTANEOUS at 12:21

## 2020-12-28 RX ADMIN — ROPINIROLE HYDROCHLORIDE 0.25 MG: 0.25 TABLET, FILM COATED ORAL at 22:35

## 2020-12-28 RX ADMIN — FENTANYL CITRATE 25 MCG: 50 INJECTION INTRAMUSCULAR; INTRAVENOUS at 12:18

## 2020-12-28 ASSESSMENT — PAIN SCALES - GENERAL
PAINLEVEL_OUTOF10: 10
PAINLEVEL_OUTOF10: 8
PAINLEVEL_OUTOF10: 10
PAINLEVEL_OUTOF10: 7
PAINLEVEL_OUTOF10: 10

## 2020-12-28 NOTE — PROGRESS NOTES
[address] [place] [address] Hospitalist   Progress Note    Admitting Date and Time: 12/26/2020  9:56 AM  Admit Dx: Hyperkalemia [E87.5]    Subjective/interval history:    12/27: Pt admitted with generalized body aches last night. She also notes pain in her extremities that is very sharp, been worsening over the last several weeks to months. She is on chronic peritoneal dialysis. She states she was started on gabapentin as an outpatient, but this is not provide her much relief. Patient was found to have hyperkalemia with potassium of 6.7 in the emergency department. Today she feels that her symptoms are only slightly improved. She also complains of diarrhea, however this is chronic.    12/28: Patient is feeling better as far as a generalized achiness. She does continue to have pain in her thighs all the way down to her legs. She describes it as a dull ache but also pins-and-needles. Yesterday hospitalist increase her gabapentin dose. She went on to add that it is hard for her to sleep at night but she feels like she has to keep keep her legs moving but she never been told she had a restless leg syndrome. ROS: denies fever, chills, cp, sob, n/v, HA unless stated above.      gabapentin  200 mg Oral TID    insulin lispro  2 Units Subcutaneous TID WC    bumetanide  2 mg Oral BID    Calcium Acetate (Phos Binder)  667 mg Oral TID WC    insulin lispro  0-6 Units Subcutaneous TID WC    insulin lispro  0-3 Units Subcutaneous Nightly    atorvastatin  40 mg Oral Nightly    dilTIAZem  240 mg Oral Daily    insulin glargine  6 Units Subcutaneous Nightly    insulin glargine  8 Units Subcutaneous QAM    levothyroxine  75 mcg Oral QAM AC    metOLazone  5 mg Oral Daily    metoprolol  100 mg Oral BID    mirtazapine  15 mg Oral Nightly    sodium chloride flush  10 mL Intravenous 2 times per day    heparin (porcine)  5,000 Units Subcutaneous 3 times per day         loperamide, 2 mg, 4x Daily PRN   fentanNYL, 25 mcg, Q1H PRN      glucose, 15 g, PRN      dextrose, 12.5 g, PRN      glucagon (rDNA), 1 mg, PRN      dextrose, 100 mL/hr, PRN      sodium chloride flush, 10 mL, PRN      promethazine, 12.5 mg, Q6H PRN    Or      ondansetron, 4 mg, Q6H PRN      polyethylene glycol, 17 g, Daily PRN      acetaminophen, 650 mg, Q6H PRN    Or      acetaminophen, 650 mg, Q6H PRN      cloNIDine, 0.1 mg, BID PRN         Objective:    BP (!) 163/102   Pulse 96   Temp 99 °F (37.2 °C) (Oral)   Resp 20   Ht 5' 4\" (1.626 m)   Wt 147 lb 9.6 oz (67 kg)   SpO2 99%   BMI 25.34 kg/m²   General Appearance: alert and oriented to person, place and time and in no acute distress  Skin: warm and dry  Head: normocephalic and atraumatic  Eyes: pupils equal, round, and reactive to light, extraocular eye movements intact, conjunctivae normal  Neck: neck supple and non tender without mass   Pulmonary/Chest: clear to auscultation bilaterally- no wheezes, rales or rhonchi, normal air movement, no respiratory distress  Cardiovascular: normal rate, normal S1 and S2 and no carotid bruits  Abdomen: soft, non-tender, non-distended, normal bowel sounds, no masses or organomegaly. Peritoneal dialysis catheter in place  Extremities: no cyanosis, no clubbing and no edema  Neurologic: no cranial nerve deficit and speech normal      Recent Labs     12/27/20  0855 12/27/20  0855 12/27/20  1155 12/27/20  1230 12/28/20  0547   *  --  130*  --  131*   K 6.4*  --  4.6  --  6.0*   CL 96*  --  95*  --  94*   CO2 19*  --  20*  --  24   BUN 40*  --  41*  --  45*   CREATININE 7.2*  --  7.2*  --  7.4*   GLUCOSE 434*   < > 476* 444 471*   CALCIUM 7.4*  --  7.7*  --  7.7*    < > = values in this interval not displayed.        Recent Labs     12/26/20  1105   ALKPHOS 161*   PROT 6.7   LABALBU 2.8*   BILITOT <0.2   AST 45*   ALT 27       Recent Labs     12/26/20  1105 12/27/20  0545 12/28/20  0547   WBC 5.8 3.7* 3.9*   RBC 4.35 3.61 3.75 HGB 12.5 10.4* 10.6*   HCT 38.8 30.9* 32.0*   MCV 89.2 85.6 85.3   MCH 28.7 28.8 28.3   MCHC 32.2 33.7 33.1   RDW 14.3 14.1 13.6    191 245   MPV 10.4 10.9 10.3       CBC:   Lab Results   Component Value Date    WBC 3.9 12/28/2020    RBC 3.75 12/28/2020    HGB 10.6 12/28/2020    HCT 32.0 12/28/2020    MCV 85.3 12/28/2020    MCH 28.3 12/28/2020    MCHC 33.1 12/28/2020    RDW 13.6 12/28/2020     12/28/2020    MPV 10.3 12/28/2020     BMP:    Lab Results   Component Value Date     12/28/2020    K 6.0 12/28/2020    CL 94 12/28/2020    CO2 24 12/28/2020    BUN 45 12/28/2020    LABALBU 2.8 12/26/2020    LABALBU 4.1 05/18/2012    CREATININE 7.4 12/28/2020    CALCIUM 7.7 12/28/2020    GFRAA 8 12/28/2020    LABGLOM 8 12/28/2020    GLUCOSE 471 12/28/2020    GLUCOSE 130 05/18/2012        Radiology:   XR CHEST PORTABLE   Final Result   No acute process. Assessment/plan:  Principal Problem:    Hyperkalemia  Active Problems:    ESRD on peritoneal dialysis ([facility]    Hypertension    Hypothyroidism    Diabetes mellitus type 1, uncontrolled ([facility]    Chronic diarrhea    Diabetic polyneuropathy associated with type 1 diabetes mellitus ([facility]  Resolved Problems:    * No resolved hospital problems. *      1. Hyperkalemia  -Treated with IV insulin, potassium at noon yesterday was 4.6  [facility] received a dose 12/26 and 12/27 but subsequently discontinued. K back up to 6.0 today but specimen was hemolyzed. Will need to repeat. 2.  ESRD on peritoneal dialysis  -Continue chronic PD regimen per nephrology    3. Uncontrolled type 1 diabetes mellitus   -Continue home insulin regimen  -Patient's blood glucose is extremely labile, caution with larger doses of subcutaneous or IV insulin  -She is currently undergoing insulin pump training as an outpatient    4.   Diabetic polyneuropathy with probable RLS -her dose of gabapentin was increased from 100 mg tid to 200 mg tid as her symptoms were uncontrolled  -she also complains of need to constantly move legs at night to get relief. Never been treated for RLS in past. Will ask Pharm D if okay for trial of Mirapex or Requip. 5.  Hypothyroidism  -Continue home levothyroxine    6. Hypertension  -Continue metoprolol tartrate, metolazone, diltiazem, Bumex    7. History of heart failure with preserved ejection fraction  -Continue metoprolol tartrate, metolazone, Bumex    8. Chronic diarrhea  -Continue home Imodium as needed    DVT prophylaxis: Subcutaneous heparin  CODE STATUS: Full code    Disposition: Likely discharge in 24 to 48 hours if potassium level stabilizes    NOTE: This report was transcribed using voice recognition software. Every effort was made to ensure accuracy; however, inadvertent computerized transcription errors may be present.      Electronically signed by Russell Hendrickson MD on 12/28/2020 at 11:52 AM

## 2020-12-28 NOTE — PROGRESS NOTES
[employer] INITIATED WITHOUT PROBLEM. CLEAR YELLOW EFFLUENT DRAINAGE NOTED. PT DENIES C/O AT THIS TIME. DRESSING CHANGE AND INSERTION SITE BENIGN, BUT PT REPORTS TENDERNESS AT INSERTION SITE SINCE PLACEMENT OF PD LINE. INITIAL DRAIN NETTED -171 UF.        BP (!) 160/98   Pulse 96   Temp 98 °F (36.7 °C) (Oral)   Resp 24   Ht 5' 4\" (1.626 m)   Wt 144 lb (65.3 kg)   SpO2 99%   BMI 24.72 kg/m²

## 2020-12-28 NOTE — CARE COORDINATION
12/28/2020 No covid testing. Cm transition of care: met with patient while in PCU- progressive care unit. Pt lives in an apartment all one story with her two children. She does her own Peritoneal dialysis fills at night and empties in morning. [facility] supplies her dialysis needs. Pt plans to return to home and identifies no needs at this time. Pt for c diff pending, watch for oral vanco. CM/SS  following.  Electronically signed by TOR Fernandes on 12/28/2020 at 9:37 AM

## 2020-12-29 VITALS
RESPIRATION RATE: 17 BRPM | SYSTOLIC BLOOD PRESSURE: 158 MMHG | TEMPERATURE: 98 F | HEIGHT: 64 IN | OXYGEN SATURATION: 100 % | WEIGHT: 148.37 LBS | DIASTOLIC BLOOD PRESSURE: 92 MMHG | HEART RATE: 88 BPM | BODY MASS INDEX: 25.33 KG/M2

## 2020-12-29 LAB
ANION GAP SERPL CALCULATED.3IONS-SCNC: 14 MMOL/L (ref 7–16)
BASOPHILS ABSOLUTE: 0.07 E9/L (ref 0–0.2)
BASOPHILS RELATIVE PERCENT: 0.9 % (ref 0–2)
BUN BLDV-MCNC: 42 MG/DL (ref 6–20)
CALCIUM SERPL-MCNC: 8.3 MG/DL (ref 8.6–10.2)
CHLORIDE BLD-SCNC: 95 MMOL/L (ref 98–107)
CO2: 21 MMOL/L (ref 22–29)
CREAT SERPL-MCNC: 6.8 MG/DL (ref 0.5–1)
EOSINOPHILS ABSOLUTE: 0.38 E9/L (ref 0.05–0.5)
EOSINOPHILS RELATIVE PERCENT: 5 % (ref 0–6)
GFR AFRICAN AMERICAN: 9
GFR NON-AFRICAN AMERICAN: 9 ML/MIN/1.73
GLUCOSE BLD-MCNC: 227 MG/DL (ref 74–99)
HCT VFR BLD CALC: 36.6 % (ref 34–48)
HEMOGLOBIN: 12.2 G/DL (ref 11.5–15.5)
IMMATURE GRANULOCYTES #: 0.09 E9/L
IMMATURE GRANULOCYTES %: 1.2 % (ref 0–5)
LYMPHOCYTES ABSOLUTE: 1.38 E9/L (ref 1.5–4)
LYMPHOCYTES RELATIVE PERCENT: 18.3 % (ref 20–42)
MCH RBC QN AUTO: 29 PG (ref 26–35)
MCHC RBC AUTO-ENTMCNC: 33.3 % (ref 32–34.5)
MCV RBC AUTO: 87.1 FL (ref 80–99.9)
METER GLUCOSE: 217 MG/DL (ref 74–99)
METER GLUCOSE: 399 MG/DL (ref 74–99)
METER GLUCOSE: <40 MG/DL (ref 74–99)
METER GLUCOSE: <40 MG/DL (ref 74–99)
MONOCYTES ABSOLUTE: 0.44 E9/L (ref 0.1–0.95)
MONOCYTES RELATIVE PERCENT: 5.8 % (ref 2–12)
MRSA CULTURE ONLY: NORMAL
NEUTROPHILS ABSOLUTE: 5.2 E9/L (ref 1.8–7.3)
NEUTROPHILS RELATIVE PERCENT: 68.8 % (ref 43–80)
PDW BLD-RTO: 13.9 FL (ref 11.5–15)
PLATELET # BLD: 258 E9/L (ref 130–450)
PMV BLD AUTO: 10.1 FL (ref 7–12)
POTASSIUM SERPL-SCNC: 4.6 MMOL/L (ref 3.5–5)
RBC # BLD: 4.2 E12/L (ref 3.5–5.5)
SODIUM BLD-SCNC: 130 MMOL/L (ref 132–146)
WBC # BLD: 7.6 E9/L (ref 4.5–11.5)

## 2020-12-29 PROCEDURE — 6370000000 HC RX 637 (ALT 250 FOR IP): Performed by: INTERNAL MEDICINE

## 2020-12-29 PROCEDURE — 80048 BASIC METABOLIC PNL TOTAL CA: CPT

## 2020-12-29 PROCEDURE — 85025 COMPLETE CBC W/AUTO DIFF WBC: CPT

## 2020-12-29 PROCEDURE — 99239 HOSP IP/OBS DSCHRG MGMT >30: CPT | Performed by: INTERNAL MEDICINE

## 2020-12-29 PROCEDURE — 6360000002 HC RX W HCPCS: Performed by: INTERNAL MEDICINE

## 2020-12-29 PROCEDURE — 2500000003 HC RX 250 WO HCPCS: Performed by: INTERNAL MEDICINE

## 2020-12-29 PROCEDURE — 82962 GLUCOSE BLOOD TEST: CPT

## 2020-12-29 PROCEDURE — 2580000003 HC RX 258: Performed by: STUDENT IN AN ORGANIZED HEALTH CARE EDUCATION/TRAINING PROGRAM

## 2020-12-29 PROCEDURE — 36415 COLL VENOUS BLD VENIPUNCTURE: CPT

## 2020-12-29 RX ORDER — ROPINIROLE 0.25 MG/1
0.25 TABLET, FILM COATED ORAL NIGHTLY
Qty: 30 TABLET | Refills: 0 | Status: SHIPPED | OUTPATIENT
Start: 2020-12-29 | End: 2021-02-15 | Stop reason: ALTCHOICE

## 2020-12-29 RX ORDER — GABAPENTIN 100 MG/1
200 CAPSULE ORAL 3 TIMES DAILY
Qty: 180 CAPSULE | Refills: 0 | Status: SHIPPED | OUTPATIENT
Start: 2020-12-29 | End: 2021-02-15 | Stop reason: ALTCHOICE

## 2020-12-29 RX ORDER — BUMETANIDE 2 MG/1
2 TABLET ORAL 2 TIMES DAILY
Qty: 30 TABLET | Refills: 3 | Status: ON HOLD | OUTPATIENT
Start: 2020-12-29 | End: 2021-02-27 | Stop reason: HOSPADM

## 2020-12-29 RX ORDER — LOPERAMIDE HYDROCHLORIDE 2 MG/1
2 CAPSULE ORAL 4 TIMES DAILY PRN
Qty: 60 CAPSULE | Refills: 0
Start: 2020-12-29 | End: 2021-01-13

## 2020-12-29 RX ORDER — CALCIUM ACETATE 667 MG/1
667 CAPSULE ORAL
Qty: 90 CAPSULE | Refills: 0 | Status: ON HOLD | OUTPATIENT
Start: 2020-12-29 | End: 2021-05-20 | Stop reason: HOSPADM

## 2020-12-29 RX ADMIN — INSULIN GLARGINE 8 UNITS: 100 INJECTION, SOLUTION SUBCUTANEOUS at 10:53

## 2020-12-29 RX ADMIN — DILTIAZEM HYDROCHLORIDE 240 MG: 240 CAPSULE, COATED, EXTENDED RELEASE ORAL at 10:44

## 2020-12-29 RX ADMIN — FENTANYL CITRATE 25 MCG: 50 INJECTION INTRAMUSCULAR; INTRAVENOUS at 10:55

## 2020-12-29 RX ADMIN — METOPROLOL TARTRATE 100 MG: 25 TABLET, FILM COATED ORAL at 10:42

## 2020-12-29 RX ADMIN — DEXTROSE MONOHYDRATE 12.5 G: 500 INJECTION PARENTERAL at 09:26

## 2020-12-29 RX ADMIN — BUMETANIDE 2 MG: 1 TABLET ORAL at 10:44

## 2020-12-29 RX ADMIN — INSULIN LISPRO 2 UNITS: 100 INJECTION, SOLUTION INTRAVENOUS; SUBCUTANEOUS at 13:32

## 2020-12-29 RX ADMIN — HEPARIN SODIUM 5000 UNITS: 5000 INJECTION INTRAVENOUS; SUBCUTANEOUS at 06:24

## 2020-12-29 RX ADMIN — LEVOTHYROXINE SODIUM 75 MCG: 75 TABLET ORAL at 06:24

## 2020-12-29 RX ADMIN — METOLAZONE 5 MG: 2.5 TABLET ORAL at 10:44

## 2020-12-29 RX ADMIN — GABAPENTIN 200 MG: 100 CAPSULE ORAL at 10:44

## 2020-12-29 RX ADMIN — GABAPENTIN 200 MG: 100 CAPSULE ORAL at 14:36

## 2020-12-29 RX ADMIN — INSULIN LISPRO 2 UNITS: 100 INJECTION, SOLUTION INTRAVENOUS; SUBCUTANEOUS at 13:31

## 2020-12-29 RX ADMIN — CALCIUM ACETATE 667 MG: 667 CAPSULE ORAL at 13:36

## 2020-12-29 RX ADMIN — CALCIUM ACETATE 667 MG: 667 CAPSULE ORAL at 11:31

## 2020-12-29 ASSESSMENT — PAIN SCALES - GENERAL: PAINLEVEL_OUTOF10: 10

## 2020-12-29 NOTE — SIGNIFICANT EVENT
[address] [place], [place] [address]ist RRT/Code Blue Note    Subjective:    Called to bedside for evaluation for episode of unresponsiveness. Patient's nurse found the patient to be unresponsive checked her blood sugar was RR low. She was given a amp of D50 without response. Repeat blood sugar came back at RR low and she was still unresponsive. At this point, RRT was called and a second amp of D50 was given. Upon my arrival, patient was sluggish but able to answer questions. Sugar checked at this time was up to 399.         rOPINIRole  0.25 mg Oral Nightly    gabapentin  200 mg Oral TID    insulin lispro  2 Units Subcutaneous TID WC    bumetanide  2 mg Oral BID    Calcium Acetate (Phos Binder)  667 mg Oral TID WC    insulin lispro  0-6 Units Subcutaneous TID WC    insulin lispro  0-3 Units Subcutaneous Nightly    atorvastatin  40 mg Oral Nightly    dilTIAZem  240 mg Oral Daily    insulin glargine  6 Units Subcutaneous Nightly    insulin glargine  8 Units Subcutaneous QAM    levothyroxine  75 mcg Oral QAM AC    metOLazone  5 mg Oral Daily    metoprolol  100 mg Oral BID    mirtazapine  15 mg Oral Nightly    sodium chloride flush  10 mL Intravenous 2 times per day    heparin (porcine)  5,000 Units Subcutaneous 3 times per day         loperamide, 2 mg, 4x Daily PRN      fentanNYL, 25 mcg, Q1H PRN      glucose, 15 g, PRN      dextrose, 12.5 g, PRN      glucagon (rDNA), 1 mg, PRN      dextrose, 100 mL/hr, PRN      sodium chloride flush, 10 mL, PRN      promethazine, 12.5 mg, Q6H PRN    Or      ondansetron, 4 mg, Q6H PRN      polyethylene glycol, 17 g, Daily PRN      acetaminophen, 650 mg, Q6H PRN    Or      acetaminophen, 650 mg, Q6H PRN      cloNIDine, 0.1 mg, BID PRN         Objective:    BP (!) 151/83   Pulse 84   Temp 98 °F (36.7 °C) (Oral)   Resp 17   Ht 5' 4\" (1.626 m)   Wt 149 lb 14.4 oz (68 kg)   SpO2 100%   BMI 25.73 kg/m² General Appearance: somnolent but able to be aroused. Skin: warm and dry, no rash or erythema  Head: normocephalic and atraumatic  Eyes: pupils equal, round, and reactive to light, extraocular eye movements intact, conjunctivae normal  ENT: external ear and ear canal normal bilaterally, nose without deformity  Neck: supple and non-tender without mass, no cervical lymphadenopathy  Pulmonary/Chest: clear to auscultation bilaterally- no wheezes, rales or rhonchi  Cardiovascular: normal rate, regular rhythm, normal S1 and S2, no murmurs, rubs, clicks, or gallops  Abdomen: soft, non-tender, non-distended, normal bowel sounds, no masses or organomegaly  Extremities: no cyanosis, clubbing or edema  Musculoskeletal: normal range of motion, no joint swelling, deformity or tenderness  Neurologic: sluggish. Recent Labs     12/27/20  1155 12/27/20  1230 12/28/20  0547 12/28/20  1650   *  --  131* 133   K 4.6  --  6.0* 4.4   CL 95*  --  94* 96*   CO2 20*  --  24 25   BUN 41*  --  45* 47*   CREATININE 7.2*  --  7.4* 7.4*   GLUCOSE 476* 444 471* 154*   CALCIUM 7.7*  --  7.7* 8.0*       Recent Labs     12/26/20  1105 12/27/20  0545 12/28/20  0547   WBC 5.8 3.7* 3.9*   RBC 4.35 3.61 3.75   HGB 12.5 10.4* 10.6*   HCT 38.8 30.9* 32.0*   MCV 89.2 85.6 85.3   MCH 28.7 28.8 28.3   MCHC 32.2 33.7 33.1   RDW 14.3 14.1 13.6    191 245   MPV 10.4 10.9 10.3       No intake/output data recorded. No intake/output data recorded. Radiology: None    Assessment:    Principal Problem:    Hyperkalemia  Active Problems:    [facility]    Hypertension    Hypothyroidism    Diabetes mellitus type 1, uncontrolled (HCC)    Chronic diarrhea    Diabetic polyneuropathy associated with type 1 diabetes mellitus ([facility]  Resolved Problems:    * No resolved hospital problems.  *      Plan: 1. Unresponsiveness next due to hypoglycemic episode--patient is a brittle diabetic and often does get hypoglycemic episodes. She is working with endocrinologist to get a insulin pump. She was actually wearing CGM [facility]) but not currently connected to phone. Hold insulin coverage for this morning and standing rapid-acting insulin for breakfast. Will give her scheduled Lantus once patient has eaten breakfast.    Critical care time 30 minutes not including procedures. NOTE: This report was transcribed using voice recognition software.  Every effort was made to ensure accuracy; however, inadvertent computerized transcription errors may be present.     Electronically signed by Carlotta Martinez MD on 12/29/2020 at 9:46 AM

## 2020-12-29 NOTE — DISCHARGE SUMMARY
History of Present Illness:  31 y.o. female with a history of diabetes mellitus type 1, end-stage renal disease on peritoneal dialysis, hyperlipidemia, diabetic neuropathy, depression and hypothyroidism presents with generalized body pain for 1 day. Patient was extremely sleepy during my encounter, very limited history obtained from the patient at the bedside. She felt very pain for that reason came to ER. She denies any fever, chest pain or any shortness of breath. In ER her potassium found elevated. She received her peritoneal dialysis yesterday. In ER her vitals shows blood pressure 102/66, pulse 72, respiration 14 and temperature 97.4 °F.  Her labs in ER shows sodium 135, potassium 6.7 and sugar 105. Her albumin 2.8 alk phosphatase 161.       Hospital Course: 32 yo female admitted as per above details. See outline below for additional details and issues addressed this admission:      1. Hyperkalemia  -Treated with IV insulin, potassium at noon yesterday was 4.6  -[facility] received a dose 12/26 and 12/27 but subsequently discontinued. K back up to 6.0 today but specimen was hemolyzed. Upon repeat yesterday was 4.4. Today, K stable 4.6     2. ESRD on peritoneal dialysis  -Continue chronic PD regimen per nephrology. Nephrology started on PhosLo 667 mg tid and home-going Rx for this.     3. Uncontrolled type 1 diabetes mellitus with hypoglycemia.  -Continued home insulin regimen but had severe hypoglycemic reaction this morning which resolved with 2 amps of D50. Patient eager for discharge to follow up on insulin pump training. She has appointment tomorrow.  -Patient's blood glucose is extremely labile, caution with larger doses of subcutaneous or IV insulin     4. Diabetic polyneuropathy with probable RLS   -her dose of gabapentin was increased from 100 mg tid to 200 mg tid as her symptoms were uncontrolled. New Rx for 200 mg tid was given x 30 days. Reviewed [facility] report. -Yesterday, she also complained of need to constantly move legs at night to get relief. Never been treated for RLS in past. Will ask Pharm D if okay for trial of Mirapex or Requip. They approved of Requip and she felt she got relief from that last night. Home-going Rx for Requip 0.25 mg nightly #30/NR     5.  Hypothyroidism  -Continue home levothyroxine     6. Hypertension  -Continue metoprolol tartrate, metolazone, diltiazem, Bumex     7. History of heart failure with preserved ejection fraction  -Continue metoprolol tartrate, metolazone, Bumex. Dose increased to bid by nephrology and given new Rx for higher frequency.     8. Chronic diarrhea  -Continue home Imodium as needed    Discharge Exam:  Vitals:    12/28/20 2230 12/29/20 0200 12/29/20 0700 12/29/20 1042   BP: (!) 133/93 (!) 151/83  (!) 158/92   Pulse: 91 84  88   Resp: 16 17     Temp: 98.2 °F (36.8 °C) 98 °F (36.7 °C)     TempSrc: Oral Oral     SpO2: 97% 100%     Weight:   148 lb 5.9 oz (67.3 kg)    Height:         General Appearance: alert and oriented to person, place and time and in no acute distress  Skin: warm and dry  Head: normocephalic and atraumatic  Eyes: pupils equal, round, and reactive to light, extraocular eye movements intact, conjunctivae normal  Neck: neck supple and non tender without mass   Pulmonary/Chest: clear to auscultation bilaterally- no wheezes, rales or rhonchi, normal air movement, no respiratory distress  Cardiovascular: normal rate, normal S1 and S2 and no carotid bruits  Abdomen: soft, non-tender, non-distended, normal bowel sounds, no masses or organomegaly.   Peritoneal dialysis catheter in place  Extremities: no cyanosis, no clubbing and no edema  Neurologic: no cranial nerve deficit and speech normal     I/O last 3 completed shifts:  In: -   Out: 825   I/O this shift:  In: 120 [P.O.:120]  Out: -       LABS:  Recent Labs     12/28/20  0547 12/28/20  1650 12/29/20  1209   * 133 130*   K 6.0* 4.4 4.6 CL 94* 96* 95*   CO2 24 25 21*   BUN 45* 47* 42*   CREATININE 7.4* 7.4* 6.8*   GLUCOSE 471* 154* 227*   CALCIUM 7.7* 8.0* 8.3*       Recent Labs     12/27/20  0545 12/28/20  0547 12/29/20  1209   WBC 3.7* 3.9* 7.6   RBC 3.61 3.75 4.20   HGB 10.4* 10.6* 12.2   HCT 30.9* 32.0* 36.6   MCV 85.6 85.3 87.1   MCH 28.8 28.3 29.0   MCHC 33.7 33.1 33.3   RDW 14.1 13.6 13.9    245 258   MPV 10.9 10.3 10.1       Imaging:      Xr Chest Portable    Result Date: 12/26/2020  EXAMINATION: ONE XRAY VIEW OF THE CHEST 12/26/2020 10:48 am COMPARISON: Comparison is dated December 13, 2020 HISTORY: ORDERING SYSTEM PROVIDED HISTORY: weakness TECHNOLOGIST PROVIDED HISTORY: Reason for exam:->weakness FINDINGS: The lungs are without acute focal process. There is no effusion or pneumothorax. The cardiomediastinal silhouette is without acute process. The osseous structures are without acute process. No acute process. Patient Instructions:   Current Discharge Medication List      START taking these medications    Details   Calcium Acetate, Phos Binder, 667 MG CAPS Take 1 capsule by mouth 3 times daily (with meals)  Qty: 90 capsule, Refills: 0      rOPINIRole (REQUIP) 0.25 MG tablet Take 1 tablet by mouth nightly New medication for restless legs. Qty: 30 tablet, Refills: 0      loperamide (IMODIUM) 2 MG capsule Take 1 capsule by mouth 4 times daily as needed for Diarrhea  Qty: 60 capsule, Refills: 0         CONTINUE these medications which have CHANGED    Details   bumetanide (BUMEX) 2 MG tablet Take 1 tablet by mouth 2 times daily New higher frequency  Qty: 30 tablet, Refills: 3      gabapentin (NEURONTIN) 100 MG capsule Take 2 capsules by mouth 3 times daily for 30 days. New higher dose.   Qty: 180 capsule, Refills: 0    Associated Diagnoses: Diabetic autonomic neuropathy associated with type 1 diabetes mellitus ([facility]         CONTINUE these medications which have NOT CHANGED    Details insulin aspart (NOVOLOG) 100 UNIT/ML injection vial 2 Units 3 times daily (before meals) Plus sliding scale    Associated Diagnoses: Type 1 diabetes mellitus with nephropathy (HCC)      dilTIAZem (CARDIZEM CD) 240 MG extended release capsule Take 1 capsule by mouth daily  Qty: 30 capsule, Refills: 2    Associated Diagnoses: Diabetic autonomic neuropathy associated with type 1 diabetes mellitus ([facility]      ! ! insulin glargine (LANTUS) 100 UNIT/ML injection vial Inject 8 Units into the skin every morning  Qty: 1 vial, Refills: 3    Associated Diagnoses: Type 1 diabetes mellitus with nephropathy ([facility])      mirtazapine (REMERON) 15 MG tablet Take 1 tablet by mouth nightly  Qty: 30 tablet, Refills: 2    Associated Diagnoses: Bipolar affective disorder, remission status unspecified ([facility])      insulin lispro (HUMALOG) 100 UNIT/ML injection vial 50 units via insulin pump daily  Qty: 2 vial, Refills: 5    Associated Diagnoses: Type 1 diabetes mellitus with other specified complication ([facility] Insulin pump in place      blood glucose test strips (CONTOUR NEXT TEST) strip 1 each by In Vitro route 5 times daily As needed. Qty: 150 each, Refills: 5    Associated Diagnoses: Type 1 diabetes mellitus with other specified complication ([facility]  Insulin pump in place      !! insulin glargine (LANTUS) 100 UNIT/ML injection vial Inject 6 Units into the skin nightly  Qty: 1 vial, Refills: 3      metoclopramide (REGLAN) 5 MG tablet Take 5 mg by mouth 4 times daily (before meals and nightly)      metoprolol (LOPRESSOR) 100 MG tablet Take 100 mg by mouth 2 times daily      levothyroxine (SYNTHROID) 75 MCG tablet Take 1 tablet by mouth every morning (before breakfast)  Qty: 30 tablet, Refills: 3      metOLazone (ZAROXOLYN) 5 MG tablet Take 5 mg by mouth daily      cloNIDine (CATAPRES) 0.1 MG tablet Take 0.1 mg by mouth 2 times daily as needed for High Blood Pressure      atorvastatin (LIPITOR) 40 MG tablet Take 1 tablet by mouth nightly Qty: 30 tablet, Refills: 5    Associated Diagnoses: Hyperlipidemia, unspecified hyperlipidemia type       !! - Potential duplicate medications found. Please discuss with provider. STOP taking these medications       potassium chloride (KLOR-CON M) 20 MEQ extended release tablet Comments:   Reason for Stopping:             Note that more than 30 minutes was spent in preparing discharge papers, discussing discharge with patient, medication review, etc.    NOTE: This report was transcribed using voice recognition software. Every effort was made to ensure accuracy; however, inadvertent computerized transcription errors may be present.      Signed:  Electronically signed by Annamaria Hill MD on 12/29/2020 at 2:09 PM

## 2020-12-29 NOTE — CARE COORDINATION
SS Note: No Covid testing. RRT called this morning as pt experienced a hypoglycemic episode. Per CM note, she met with pt and pt plans to return home upon dc and identified no needs upon dc. Note stated pt does her own Peritoneal dialysis at home.   Electronically signed by HANNAH Lindsey on 12/29/2020 at 12:22 PM

## 2020-12-31 LAB
BLOOD CULTURE, ROUTINE: NORMAL
CULTURE, BLOOD 2: NORMAL

## 2021-01-07 ENCOUNTER — HOSPITAL ENCOUNTER (EMERGENCY)
Age: 31
Discharge: HOME | End: 2021-01-07
Attending: EMERGENCY MEDICINE
Payer: COMMERCIAL

## 2021-01-07 ENCOUNTER — APPOINTMENT (OUTPATIENT)
Dept: ULTRASOUND IMAGING | Age: 31
End: 2021-01-07
Payer: COMMERCIAL

## 2021-01-07 VITALS
HEART RATE: 85 BPM | HEIGHT: 64 IN | TEMPERATURE: 98.1 F | WEIGHT: 143 LBS | DIASTOLIC BLOOD PRESSURE: 59 MMHG | OXYGEN SATURATION: 97 % | SYSTOLIC BLOOD PRESSURE: 102 MMHG | RESPIRATION RATE: 16 BRPM | BODY MASS INDEX: 24.41 KG/M2

## 2021-01-07 DIAGNOSIS — M79.605 LEFT LEG PAIN: Primary | ICD-10-CM

## 2021-01-07 PROCEDURE — 99285 EMERGENCY DEPT VISIT HI MDM: CPT

## 2021-01-07 PROCEDURE — 93971 EXTREMITY STUDY: CPT

## 2021-01-07 PROCEDURE — 6370000000 HC RX 637 (ALT 250 FOR IP): Performed by: STUDENT IN AN ORGANIZED HEALTH CARE EDUCATION/TRAINING PROGRAM

## 2021-01-07 RX ORDER — OXYCODONE HYDROCHLORIDE AND ACETAMINOPHEN 5; 325 MG/1; MG/1
1 TABLET ORAL ONCE
Status: COMPLETED | OUTPATIENT
Start: 2021-01-07 | End: 2021-01-07

## 2021-01-07 RX ORDER — ORPHENADRINE CITRATE 100 MG/1
100 TABLET, EXTENDED RELEASE ORAL ONCE
Status: COMPLETED | OUTPATIENT
Start: 2021-01-07 | End: 2021-01-07

## 2021-01-07 RX ADMIN — ORPHENADRINE CITRATE 100 MG: 100 TABLET, EXTENDED RELEASE ORAL at 20:46

## 2021-01-07 RX ADMIN — OXYCODONE AND ACETAMINOPHEN 1 TABLET: 5; 325 TABLET ORAL at 20:46

## 2021-01-07 ASSESSMENT — ENCOUNTER SYMPTOMS
BACK PAIN: 0
COUGH: 0
PHOTOPHOBIA: 0
ABDOMINAL PAIN: 0
SHORTNESS OF BREATH: 0
DIARRHEA: 0
NAUSEA: 0
TROUBLE SWALLOWING: 0
VOMITING: 0

## 2021-01-08 NOTE — ED PROVIDER NOTES
The patient is a 28-year-old female with a history of end-stage renal disease on peritoneal dialysis, type 1 diabetes who presents to the emergency department complaining of leg pain. The patient states that her left lower leg has been hurting her for the last week. She states her symptoms are sudden onset, constant, and mild in severity. She states that it is worse if she tries to sit on the leg or with certain movements. She states that nothing makes it better. She tried taking ibuprofen and Tylenol at home with minimal relief. The patient states he has never had symptoms like this in the past.  She denies any fever, chills, fall, trauma, injury, back pain, dysuria, hematuria, abdominal pain, history of blood clots or bleeding disorders, current blood thinner use, issues with her blood sugar, or other acute symptoms or concerns. She states that her blood sugars have been running in the 180s. She has had no recent issues with her blood glucose. She also states that she does peritoneal dialysis nightly, and she has been completing the entire course of her dialysis sessions and has not missed a session. The history is provided by the patient. Review of Systems   Constitutional: Negative for chills, fatigue and fever. HENT: Negative for congestion and trouble swallowing. Eyes: Negative for photophobia and visual disturbance. Respiratory: Negative for cough and shortness of breath. Cardiovascular: Negative for chest pain and leg swelling. Gastrointestinal: Negative for abdominal pain, diarrhea, nausea and vomiting. Genitourinary: Negative for dysuria, flank pain, frequency and hematuria. Musculoskeletal: Positive for arthralgias. Negative for back pain and neck pain. Skin: Negative for rash and wound. Neurological: Negative for dizziness, syncope, weakness, light-headedness, numbness and headaches. All other systems reviewed and are negative.        Physical Exam  Vitals signs and nursing note reviewed. Constitutional:       General: She is not in acute distress. Appearance: She is well-developed. She is not ill-appearing, toxic-appearing or diaphoretic. HENT:      Head: Normocephalic and atraumatic. Nose: Nose normal.      Mouth/Throat:      Lips: Pink. No lesions. Mouth: Mucous membranes are moist.   Eyes:      General: Lids are normal.   Neck:      Musculoskeletal: Normal range of motion and neck supple. Cardiovascular:      Rate and Rhythm: Normal rate and regular rhythm. Heart sounds: Normal heart sounds, S1 normal and S2 normal. No murmur. Pulmonary:      Effort: Pulmonary effort is normal. No respiratory distress. Breath sounds: Normal breath sounds and air entry. No decreased breath sounds, wheezing, rhonchi or rales. Abdominal:      General: Bowel sounds are normal.      Palpations: Abdomen is soft. Tenderness: There is no abdominal tenderness. There is no guarding or rebound. Musculoskeletal:      Lumbar back: She exhibits normal range of motion, no tenderness, no bony tenderness, no swelling and no pain. Right lower leg: Edema present. Left lower leg: Edema present. Comments: Mild nonpitting edema of the bilateral lower extremities, left is slightly greater than the right. She does have decreased range of motion of the left lower leg secondary to pain. There is no evidence of trauma, no rash. Sensation of the bilateral lower extremities is intact. DP pulses are +2 bilaterally. Skin:     General: Skin is warm and dry. Neurological:      Mental Status: She is alert and oriented to person, place, and time. Motor: No tremor or abnormal muscle tone. Coordination: Coordination normal.          Procedures     MDM  Number of Diagnoses or Management Options  Left leg pain  Diagnosis management comments: Patient is a 82-year-old female presents to the emergency department complaining of leg pain.   She is hemodynamically stable, nontoxic in appearance, and in no acute distress. She does have decreased range of motion of the left leg secondary to pain. There is no evidence of trauma. There is mild edema of the lower extremities, left is slightly greater than right. She does not have any point tenderness over the hip, femur, knee, tibia, fibula, or foot. Sensation is intact and equal of the bilateral lower extremities. Ultrasound of the leg does not show any evidence of DVT. They treated patient with a dose of Percocet and Norflex. Recommend her to follow-up with her family doctor. Also recommend her to use Tylenol at home. She is to return here for worsening symptoms or other acute symptoms or concerns. Patient verbalized understanding agreement to treatment plan and discharge instructions. ED Course as of Jan 07 1939   Alfonzo Lamb Jan 07, 2021 1928 ATTENDING PROVIDER ATTESTATION:     I have personally performed and/or participated in the history, exam, medical decision making, and procedures and agree with all pertinent clinical information unless otherwise noted. I have also reviewed and agree with the past medical, family and social history unless otherwise noted. I have discussed this patient in detail with the resident, and provided the instruction and education regarding patient here complaining of chronically mildly swollen bilateral legs but the left leg has been slightly more swollen and more painful for the last 2 weeks. No chest pain, palpitations or shortness of breath. States her sugar has been running well, 180 today. No chest pain, palpitations or shortness of breath. No abdominal pain. No nausea or vomiting or diarrhea or flank pain. .  My findings/plan: Patient is sitting in the bed resting comfortably no distress. Heart rate regular, lungs are clear and equal.  Abdomen nontender.   Trace bilateral pretibial edema, both legs are mildly swollen with the left leg being very minimally more swollen than the right, she is neurovascular intact distally in both feet with strong dorsal pedal pulses. [NC]      ED Course User Index  [NC] Celeste Kidd DO        ED Course as of Jan 07 2056   Thu Jan 07, 2021   5476 ATTENDING PROVIDER ATTESTATION:     I have personally performed and/or participated in the history, exam, medical decision making, and procedures and agree with all pertinent clinical information unless otherwise noted. I have also reviewed and agree with the past medical, family and social history unless otherwise noted. I have discussed this patient in detail with the resident, and provided the instruction and education regarding patient here complaining of chronically mildly swollen bilateral legs but the left leg has been slightly more swollen and more painful for the last 2 weeks. No chest pain, palpitations or shortness of breath. States her sugar has been running well, 180 today. No chest pain, palpitations or shortness of breath. No abdominal pain. No nausea or vomiting or diarrhea or flank pain. .  My findings/plan: Patient is sitting in the bed resting comfortably no distress. Heart rate regular, lungs are clear and equal.  Abdomen nontender. Trace bilateral pretibial edema, both legs are mildly swollen with the left leg being very minimally more swollen than the right, she is neurovascular intact distally in both feet with strong dorsal pedal pulses.           [NC]      ED Course User Index  [NC] Celeste Kidd DO       --------------------------------------------- PAST HISTORY ---------------------------------------------  Past Medical History:  has a past medical history of Acute congestive heart failure ([facility] [address] BC (acute kidney injury) ([facility] [address] Cephalgia, Chronic kidney disease, Depression, Diabetes mellitus ([facility] [address] Diabetic ketoacidosis ([address] Hemodialysis patient ([address] History of blood transfusion, Hyperlipidemia, Hypothyroidism, Iron Pulse 85   Temp 98.1 °F (36.7 °C)   Resp 16   Ht 5' 4\" (1.626 m)   Wt 143 lb (64.9 kg)   SpO2 97%   BMI 24.55 kg/m²   Oxygen Saturation Interpretation: Normal      ------------------------------------------ PROGRESS NOTES ------------------------------------------  8:56 PM EST  I have spoken with the patient and discussed todays results, in addition to providing specific details for the plan of care and counseling regarding the diagnosis and prognosis. Their questions are answered at this time and they are agreeable with the plan. I discussed at length with them reasons for immediate return here for re evaluation. They will followup with their primary care physician by calling their office tomorrow. --------------------------------- ADDITIONAL PROVIDER NOTES ---------------------------------  At this time the patient is without objective evidence of an acute process requiring hospitalization or inpatient management. They have remained hemodynamically stable throughout their entire ED visit and are stable for discharge with outpatient follow-up. The plan has been discussed in detail and they are aware of the specific conditions for emergent return, as well as the importance of follow-up. Discharge Medication List as of 1/7/2021  8:47 PM          Diagnosis:  1. Left leg pain        Disposition:  Patient's disposition: Discharge to home  Patient's condition is stable.           Shawnee Cantu,   Resident  01/07/21 4366

## 2021-01-22 ENCOUNTER — TELEPHONE (OUTPATIENT)
Dept: ENDOCRINOLOGY | Age: 31
End: 2021-01-22

## 2021-01-24 ENCOUNTER — HOSPITAL ENCOUNTER (EMERGENCY)
Age: 31
Discharge: HOME | End: 2021-01-24
Attending: EMERGENCY MEDICINE
Payer: COMMERCIAL

## 2021-01-24 VITALS
HEART RATE: 90 BPM | SYSTOLIC BLOOD PRESSURE: 168 MMHG | BODY MASS INDEX: 24.41 KG/M2 | HEIGHT: 64 IN | WEIGHT: 143 LBS | OXYGEN SATURATION: 98 % | DIASTOLIC BLOOD PRESSURE: 102 MMHG | TEMPERATURE: 97.2 F | RESPIRATION RATE: 18 BRPM

## 2021-01-24 DIAGNOSIS — E16.2 HYPOGLYCEMIA: Primary | ICD-10-CM

## 2021-01-24 DIAGNOSIS — E87.5 HYPERKALEMIA: ICD-10-CM

## 2021-01-24 DIAGNOSIS — Z99.2 ESRD ON DIALYSIS (HCC): ICD-10-CM

## 2021-01-24 DIAGNOSIS — N18.6 ESRD ON DIALYSIS (HCC): ICD-10-CM

## 2021-01-24 LAB
AMPHETAMINE SCREEN, URINE: NOT DETECTED
ANION GAP SERPL CALCULATED.3IONS-SCNC: 15 MMOL/L (ref 7–16)
BACTERIA: ABNORMAL /HPF
BARBITURATE SCREEN URINE: NOT DETECTED
BASOPHILS ABSOLUTE: 0.16 E9/L (ref 0–0.2)
BASOPHILS RELATIVE PERCENT: 1.9 % (ref 0–2)
BENZODIAZEPINE SCREEN, URINE: NOT DETECTED
BILIRUBIN URINE: NEGATIVE
BLOOD, URINE: ABNORMAL
BUN BLDV-MCNC: 50 MG/DL (ref 6–20)
CALCIUM SERPL-MCNC: 8.9 MG/DL (ref 8.6–10.2)
CANNABINOID SCREEN URINE: NOT DETECTED
CHLORIDE BLD-SCNC: 101 MMOL/L (ref 98–107)
CLARITY: ABNORMAL
CO2: 25 MMOL/L (ref 22–29)
COCAINE METABOLITE SCREEN URINE: NOT DETECTED
COLOR: ABNORMAL
CREAT SERPL-MCNC: 7.3 MG/DL (ref 0.5–1)
EOSINOPHILS ABSOLUTE: 0.78 E9/L (ref 0.05–0.5)
EOSINOPHILS RELATIVE PERCENT: 9.4 % (ref 0–6)
EPITHELIAL CELLS, UA: ABNORMAL /HPF
FENTANYL SCREEN, URINE: NOT DETECTED
GFR AFRICAN AMERICAN: 8
GFR NON-AFRICAN AMERICAN: 8 ML/MIN/1.73
GLUCOSE BLD-MCNC: 51 MG/DL (ref 74–99)
GLUCOSE URINE: 500 MG/DL
HCG, URINE, POC: NEGATIVE
HCT VFR BLD CALC: 39.7 % (ref 34–48)
HEMOGLOBIN: 12.4 G/DL (ref 11.5–15.5)
IMMATURE GRANULOCYTES #: 0.03 E9/L
IMMATURE GRANULOCYTES %: 0.4 % (ref 0–5)
KETONES, URINE: NEGATIVE MG/DL
LEUKOCYTE ESTERASE, URINE: ABNORMAL
LYMPHOCYTES ABSOLUTE: 2.82 E9/L (ref 1.5–4)
LYMPHOCYTES RELATIVE PERCENT: 34.1 % (ref 20–42)
Lab: NORMAL
Lab: NORMAL
MCH RBC QN AUTO: 28.1 PG (ref 26–35)
MCHC RBC AUTO-ENTMCNC: 31.2 % (ref 32–34.5)
MCV RBC AUTO: 89.8 FL (ref 80–99.9)
METER GLUCOSE: 143 MG/DL (ref 74–99)
METER GLUCOSE: 159 MG/DL (ref 74–99)
METER GLUCOSE: 192 MG/DL (ref 74–99)
METER GLUCOSE: 57 MG/DL (ref 74–99)
METHADONE SCREEN, URINE: NOT DETECTED
MONOCYTES ABSOLUTE: 0.54 E9/L (ref 0.1–0.95)
MONOCYTES RELATIVE PERCENT: 6.5 % (ref 2–12)
NEGATIVE QC PASS/FAIL: NORMAL
NEUTROPHILS ABSOLUTE: 3.95 E9/L (ref 1.8–7.3)
NEUTROPHILS RELATIVE PERCENT: 47.7 % (ref 43–80)
NITRITE, URINE: NEGATIVE
OPIATE SCREEN URINE: NOT DETECTED
OXYCODONE URINE: NOT DETECTED
PDW BLD-RTO: 15.8 FL (ref 11.5–15)
PH UA: 7.5 (ref 5–9)
PHENCYCLIDINE SCREEN URINE: NOT DETECTED
PLATELET # BLD: 340 E9/L (ref 130–450)
PMV BLD AUTO: 10.6 FL (ref 7–12)
POSITIVE QC PASS/FAIL: NORMAL
POTASSIUM REFLEX MAGNESIUM: 5.7 MMOL/L (ref 3.5–5)
PROTEIN UA: >=300 MG/DL
RBC # BLD: 4.42 E12/L (ref 3.5–5.5)
RBC UA: ABNORMAL /HPF (ref 0–2)
SODIUM BLD-SCNC: 141 MMOL/L (ref 132–146)
SPECIFIC GRAVITY UA: 1.02 (ref 1–1.03)
UROBILINOGEN, URINE: 0.2 E.U./DL
WBC # BLD: 8.3 E9/L (ref 4.5–11.5)
WBC UA: ABNORMAL /HPF (ref 0–5)

## 2021-01-24 PROCEDURE — 80307 DRUG TEST PRSMV CHEM ANLYZR: CPT

## 2021-01-24 PROCEDURE — 96374 THER/PROPH/DIAG INJ IV PUSH: CPT

## 2021-01-24 PROCEDURE — 82962 GLUCOSE BLOOD TEST: CPT

## 2021-01-24 PROCEDURE — 6360000002 HC RX W HCPCS

## 2021-01-24 PROCEDURE — 85025 COMPLETE CBC W/AUTO DIFF WBC: CPT

## 2021-01-24 PROCEDURE — 36556 INSERT NON-TUNNEL CV CATH: CPT

## 2021-01-24 PROCEDURE — 82077 ASSAY SPEC XCP UR&BREATH IA: CPT

## 2021-01-24 PROCEDURE — 99284 EMERGENCY DEPT VISIT MOD MDM: CPT

## 2021-01-24 PROCEDURE — 96375 TX/PRO/DX INJ NEW DRUG ADDON: CPT

## 2021-01-24 PROCEDURE — 80143 DRUG ASSAY ACETAMINOPHEN: CPT

## 2021-01-24 PROCEDURE — 2580000003 HC RX 258

## 2021-01-24 PROCEDURE — 80048 BASIC METABOLIC PNL TOTAL CA: CPT

## 2021-01-24 PROCEDURE — 81001 URINALYSIS AUTO W/SCOPE: CPT

## 2021-01-24 PROCEDURE — 80179 DRUG ASSAY SALICYLATE: CPT

## 2021-01-24 RX ORDER — NALOXONE HYDROCHLORIDE 1 MG/ML
1 INJECTION INTRAMUSCULAR; INTRAVENOUS; SUBCUTANEOUS ONCE
Status: COMPLETED | OUTPATIENT
Start: 2021-01-24 | End: 2021-01-24

## 2021-01-24 RX ORDER — DEXTROSE MONOHYDRATE 25 G/50ML
50 INJECTION, SOLUTION INTRAVENOUS ONCE
Status: COMPLETED | OUTPATIENT
Start: 2021-01-24 | End: 2021-01-24

## 2021-01-24 RX ORDER — DEXTROSE MONOHYDRATE 25 G/50ML
INJECTION, SOLUTION INTRAVENOUS
Status: DISCONTINUED
Start: 2021-01-24 | End: 2021-01-24 | Stop reason: HOSPADM

## 2021-01-24 RX ADMIN — NALOXONE HYDROCHLORIDE 1 MG: 1 INJECTION PARENTERAL at 11:54

## 2021-01-24 RX ADMIN — DEXTROSE MONOHYDRATE 50 ML: 25 INJECTION, SOLUTION INTRAVENOUS at 11:00

## 2021-01-24 NOTE — ED PROVIDER NOTES
also provide 1 mg of Narcan. Drug screens were negative. She was also given a meal.  Labs are obtained and appear to be close to baseline. Her potassium was elevated at 5.7, but it is typically slightly elevated, and her creatinine is 7.3 with baseline near 6.0. Patient tolerated entire meal and was answering all questions appropriately. She states that she was feeling much better and wanted to go home. Recommended patient to continue giving her dialysis as she is due for peritoneal dialysis tonight. She is to follow-up with her family doctor nephrologist.  She is return here for worsening symptoms or other acute symptoms or concerns. Patient verbalized understanding agreement to treatment plan discharge instructions. ED Course as of Jan 24 1627   Sun Jan 24, 2021   1220   ATTENDING PROVIDER ATTESTATION:     I have personally performed and/or participated in the history, exam, medical decision making, and procedures and agree with all pertinent clinical information unless otherwise noted. I have also reviewed and agree with the past medical, family and social history unless otherwise noted. I have discussed this patient in detail with the resident and provided the instruction and education regarding the evidence-based evaluation and treatment of [unfilled]  History: patient recently had an insulin pump placed. She was found this morning by SO to have be somnolent and had a BGL that read low. She is unable to provide history. She is on peritoneal dialysis at home. My findings: Jewell Christianson is a 31 y.o. female whom is in mild distress. Physical exam reveals somnolent. Heart RRR, lungs CTA, abdomen is soft and nontender. No peripheral edema. She does not follow commands. She is somnolent. My plan: Symptomatic and supportive care. Will address BGL and check labs.     Electronically signed by Belen Talamantes DO on 1/24/21 at 12:20 PM EST          [JS]   [phone] Reevaluated patient. She is sitting up in bed wrapped up in a blanket. She does answer questions appropriately. She states that she is cold. [KG]      ED Course User Index  [JS] Jam Ding DO  [KG] Perry Gallego DO          ED Course as of Jan 24 1627   Sun Jan 24, 2021   1220   ATTENDING PROVIDER ATTESTATION:     I have personally performed and/or participated in the history, exam, medical decision making, and procedures and agree with all pertinent clinical information unless otherwise noted. I have also reviewed and agree with the past medical, family and social history unless otherwise noted. I have discussed this patient in detail with the resident and provided the instruction and education regarding the evidence-based evaluation and treatment of [unfilled]  History: patient recently had an insulin pump placed. She was found this morning by SO to have be somnolent and had a BGL that read low. She is unable to provide history. She is on peritoneal dialysis at home. My findings: Gerber Oviedo is a 31 y.o. female whom is in mild distress. Physical exam reveals somnolent. Heart RRR, lungs CTA, abdomen is soft and nontender. No peripheral edema. She does not follow commands. She is somnolent. My plan: Symptomatic and supportive care. Will address BGL and check labs. Electronically signed by Jam Ding DO on 1/24/21 at 12:20 PM EST          [JS]   9925 Reevaluated patient. She is sitting up in bed wrapped up in a blanket. She does answer questions appropriately. She states that she is cold.     [KG]      ED Course User Index  [JS] Jam Ding DO  [KG] Perry Gallego DO       --------------------------------------------- PAST HISTORY ---------------------------------------------  Past Medical History:  has a past medical history of Acute congestive heart failure ([facility] [address] BC (acute kidney injury) ([facility] [address] Cephalgia, Chronic kidney disease, Depression, Diabetes mellitus ([facility] [address] Diabetic ketoacidosis ([facility] [address] Hemodialysis patient ([facility] [address] History of blood transfusion, Hyperlipidemia, Hypothyroidism, Iron deficiency anemia, MDRO (multiple drug resistant organisms) resistance, MRSA (methicillin resistant Staphylococcus aureus), Non compliance w medication regimen, Other disorders of kidney and ureter, Pregnancy, Seizure ([facility] [address] and Severe pre-eclampsia in third trimester. Past Surgical History:  has a past surgical history that includes ECHO Compl W Dop Color Flow (2012); ECHO Compl W Dop Color Flow (6/10/2013);  section; Tunneled venous port placement (2018); pr esophagogastroduodenoscopy transoral diagnostic (N/A, 2018); back surgery; Colonoscopy (N/A, 2018); Colonoscopy (N/A, 2018); Upper gastrointestinal endoscopy (2018); Upper gastrointestinal endoscopy (N/A, 10/11/2019); Cholecystectomy, laparoscopic (N/A, 2019); LAPAROSCOPY INSERTION PERITONEAL CATHETER (N/A, 2020); transesophageal echocardiogram (N/A, 10/19/2020); and embolectomy (N/A, 2020). Social History:  reports that she has been smoking cigarettes. She has a 3.00 pack-year smoking history. She uses smokeless tobacco. She reports that she does not drink alcohol or use drugs. Family History: family history includes Asthma in her brother and mother; Diabetes in her mother; High Blood Pressure in her father and mother; Hypertension in her mother. The patients home medications have been reviewed.     Allergies: Cefepime and Toradol [ketorolac tromethamine]    -------------------------------------------------- RESULTS -------------------------------------------------  Labs:  Results for orders placed or performed during the hospital encounter of 21   CBC auto differential   Result Value Ref Range    WBC 8.3 4.5 - 11.5 E9/L    RBC 4.42 3.50 - 5.50 E12/L    Hemoglobin 12.4 11.5 - 15.5 g/dL    Hematocrit 39.7 34.0 - 48.0 %    MCV 89.8 80.0 - 99.9 fL    MCH 28.1 26.0 - 35.0 pg    MCHC 31.2 (L) 32.0 - 34.5 %    RDW 15.8 (H) 11.5 - 15.0 fL    Platelets 261 927 - 286 E9/L    MPV 10.6 7.0 - 12.0 fL    Neutrophils % 47.7 43.0 - 80.0 %    Immature Granulocytes % 0.4 0.0 - 5.0 %    Lymphocytes % 34.1 20.0 - 42.0 %    Monocytes % 6.5 2.0 - 12.0 %    Eosinophils % 9.4 (H) 0.0 - 6.0 %    Basophils % 1.9 0.0 - 2.0 %    Neutrophils Absolute 3.95 1.80 - 7.30 E9/L    Immature Granulocytes # 0.03 E9/L    Lymphocytes Absolute 2.82 1.50 - 4.00 E9/L    Monocytes Absolute 0.54 0.10 - 0.95 E9/L    Eosinophils Absolute 0.78 (H) 0.05 - 0.50 E9/L    Basophils Absolute 0.16 0.00 - 0.20 C8/I   Basic Metabolic Panel w/ Reflex to MG   Result Value Ref Range    Sodium 141 132 - 146 mmol/L    Potassium reflex Magnesium 5.7 (H) 3.5 - 5.0 mmol/L    Chloride 101 98 - 107 mmol/L    CO2 25 22 - 29 mmol/L    Anion Gap 15 7 - 16 mmol/L    Glucose 51 (L) 74 - 99 mg/dL    BUN 50 (H) 6 - 20 mg/dL    CREATININE 7.3 (H) 0.5 - 1.0 mg/dL    GFR Non-African American 8 >=60 mL/min/1.73    GFR African American 8     Calcium 8.9 8.6 - 10.2 mg/dL   Urinalysis with Microscopic   Result Value Ref Range    Color, UA Straw Straw/Yellow    Clarity, UA SLCLOUDY Clear    Glucose, Ur 500 (A) Negative mg/dL    Bilirubin Urine Negative Negative    Ketones, Urine Negative Negative mg/dL    Specific Gravity, UA 1.020 1.005 - 1.030    Blood, Urine MODERATE (A) Negative    pH, UA 7.5 5.0 - 9.0    Protein, UA >=300 (A) Negative mg/dL    Urobilinogen, Urine 0.2 <2.0 E.U./dL    Nitrite, Urine Negative Negative    Leukocyte Esterase, Urine SMALL (A) Negative    WBC, UA 5-10 (A) 0 - 5 /HPF    RBC, UA 2-5 0 - 2 /HPF    Epithelial Cells, UA RARE /HPF    Bacteria, UA RARE (A) None Seen /HPF   URINE DRUG SCREEN   Result Value Ref Range    Amphetamine Screen, Urine NOT DETECTED Negative <1000 ng/mL    Barbiturate Screen, Ur NOT DETECTED Negative < 200 ng/mL    Benzodiazepine Screen, Urine NOT DETECTED Negative < 200 ng/mL    Cannabinoid Scrn, Ur NOT DETECTED Negative < 50ng/mL    Cocaine Metabolite Screen, Urine NOT DETECTED Negative < 300 ng/mL    Opiate Scrn, Ur NOT DETECTED Negative < 300ng/mL    PCP Screen, Urine NOT DETECTED Negative < 25 ng/mL    Methadone Screen, Urine NOT DETECTED Negative <300 ng/mL    Oxycodone Urine NOT DETECTED Negative <100 ng/mL    FENTANYL SCREEN, URINE NOT DETECTED Negative <1 ng/mL    Drug Screen Comment: see below    Serum Drug Screen   Result Value Ref Range    Ethanol Lvl <10 mg/dL    Acetaminophen Level <5.0 (L) 10.0 - 05.5 mcg/mL    Salicylate, Serum <6.4 0.0 - 30.0 mg/dL   POCT Glucose   Result Value Ref Range    Meter Glucose 57 (L) 74 - 99 mg/dL   POCT Glucose   Result Value Ref Range    Meter Glucose 192 (H) 74 - 99 mg/dL   POC Pregnancy Urine Qual   Result Value Ref Range    HCG, Urine, POC Negative Negative    Lot Number [id number]     Positive QC Pass/Fail Pass     Negative QC Pass/Fail Pass    POCT Glucose   Result Value Ref Range    Meter Glucose 143 (H) 74 - 99 mg/dL   POCT Glucose   Result Value Ref Range    Meter Glucose 159 (H) 74 - 99 mg/dL       Radiology:  No orders to display       ------------------------- NURSING NOTES AND VITALS REVIEWED ---------------------------  Date / Time Roomed:  1/24/2021 10:49 AM  ED Bed Assignment:  MALGORZATA/MALGORZATA    The nursing notes within the ED encounter and vital signs as below have been reviewed. BP (!) 168/102   Pulse 90   Temp 97.2 °F (36.2 °C) (Oral)   Resp 18   Ht 5' 4\" (1.626 m)   Wt 143 lb (64.9 kg)   SpO2 98%   BMI 24.55 kg/m²   Oxygen Saturation Interpretation: Normal      ------------------------------------------ PROGRESS NOTES ------------------------------------------  4:20 PM EST  I have spoken with the patient and discussed todays results, in addition to providing specific details for the plan of care and counseling regarding the diagnosis and prognosis.   Their questions are answered at this time and they are agreeable with the plan. I discussed at length with them reasons for immediate return here for re evaluation. They will followup with their primary care physician by calling their office tomorrow. --------------------------------- ADDITIONAL PROVIDER NOTES ---------------------------------  At this time the patient is without objective evidence of an acute process requiring hospitalization or inpatient management. They have remained hemodynamically stable throughout their entire ED visit and are stable for discharge with outpatient follow-up. The plan has been discussed in detail and they are aware of the specific conditions for emergent return, as well as the importance of follow-up. Discharge Medication List as of 1/24/2021  3:10 PM          Diagnosis:  1. Hypoglycemia    2. ESRD on dialysis ([address]    3. Hyperkalemia        Disposition:  Patient's disposition: Discharge to home  Patient's condition is stable.        Earlene Han,   Resident  01/24/21 1232

## 2021-01-27 LAB
ACETAMINOPHEN LEVEL: <5 MCG/ML (ref 10–30)
ETHANOL: <10 MG/DL (ref 0–0.08)
SALICYLATE, SERUM: <0.3 MG/DL (ref 0–30)
TRICYCLIC ANTIDEPRESSANTS SCREEN SERUM: NEGATIVE NG/ML

## 2021-02-02 NOTE — ED PROVIDER NOTES
go directly to the emergency room. Counseling: The emergency provider has spoken with the patient and discussed todays results, in addition to providing specific details for the plan of care and counseling regarding the diagnosis and prognosis. Questions are answered at this time and they are agreeable with the plan.      --------------------------------- IMPRESSION AND DISPOSITION ---------------------------------    IMPRESSION  1. Injury of head, initial encounter    2.  Seizure [facility])        DISPOSITION  Disposition: Discharge to [address] [place] [address] emergency room  Patient condition is good                 Robles Sahni MD  08/26/19 2025
3

## 2021-02-15 ENCOUNTER — HOSPITAL ENCOUNTER (INPATIENT)
Age: 31
LOS: 12 days | Discharge: NURSING, REHAB OR LONG-TERM CARE | DRG: 710 | End: 2021-02-27
Attending: EMERGENCY MEDICINE | Admitting: INTERNAL MEDICINE
Payer: COMMERCIAL

## 2021-02-15 ENCOUNTER — APPOINTMENT (OUTPATIENT)
Dept: CT IMAGING | Age: 31
DRG: 710 | End: 2021-02-15
Payer: COMMERCIAL

## 2021-02-15 ENCOUNTER — APPOINTMENT (OUTPATIENT)
Dept: GENERAL RADIOLOGY | Age: 31
DRG: 710 | End: 2021-02-15
Payer: COMMERCIAL

## 2021-02-15 DIAGNOSIS — N30.01 ACUTE CYSTITIS WITH HEMATURIA: ICD-10-CM

## 2021-02-15 DIAGNOSIS — I46.9 CARDIAC ARREST (HCC): Primary | ICD-10-CM

## 2021-02-15 DIAGNOSIS — E13.11 DIABETIC KETOACIDOSIS WITH COMA ASSOCIATED WITH OTHER SPECIFIED DIABETES MELLITUS (HCC): ICD-10-CM

## 2021-02-15 DIAGNOSIS — K56.609 SMALL BOWEL OBSTRUCTION (HCC): ICD-10-CM

## 2021-02-15 DIAGNOSIS — I16.1 HYPERTENSIVE EMERGENCY: ICD-10-CM

## 2021-02-15 DIAGNOSIS — R07.89 ACUTE CHEST WALL PAIN: ICD-10-CM

## 2021-02-15 DIAGNOSIS — D64.9 ACUTE ANEMIA: ICD-10-CM

## 2021-02-15 DIAGNOSIS — N17.9 AKI (ACUTE KIDNEY INJURY) (HCC): ICD-10-CM

## 2021-02-15 DIAGNOSIS — R74.01 TRANSAMINITIS: ICD-10-CM

## 2021-02-15 PROBLEM — K72.00 SHOCK LIVER: Status: ACTIVE | Noted: 2021-02-15

## 2021-02-15 LAB
ACETAMINOPHEN LEVEL: <5 MCG/ML (ref 10–30)
ALBUMIN SERPL-MCNC: 2 G/DL (ref 3.5–5.2)
ALBUMIN SERPL-MCNC: 2.4 G/DL (ref 3.5–5.2)
ALP BLD-CCNC: 236 U/L (ref 35–104)
ALP BLD-CCNC: 239 U/L (ref 35–104)
ALT SERPL-CCNC: 1037 U/L (ref 0–32)
ALT SERPL-CCNC: 860 U/L (ref 0–32)
AMPHETAMINE SCREEN, URINE: NOT DETECTED
ANION GAP SERPL CALCULATED.3IONS-SCNC: 16 MMOL/L (ref 7–16)
ANION GAP SERPL CALCULATED.3IONS-SCNC: 18 MMOL/L (ref 7–16)
ANION GAP SERPL CALCULATED.3IONS-SCNC: 22 MMOL/L (ref 7–16)
ANION GAP SERPL CALCULATED.3IONS-SCNC: 27 MMOL/L (ref 7–16)
ANION GAP SERPL CALCULATED.3IONS-SCNC: 35 MMOL/L (ref 7–16)
AST SERPL-CCNC: 1767 U/L (ref 0–31)
AST SERPL-CCNC: 1900 U/L (ref 0–31)
B.E.: -12.6 MMOL/L (ref -3–3)
B.E.: -9.6 MMOL/L
BACTERIA: ABNORMAL /HPF
BARBITURATE SCREEN URINE: NOT DETECTED
BENZODIAZEPINE SCREEN, URINE: NOT DETECTED
BETA-HYDROXYBUTYRATE: 1.99 MMOL/L (ref 0.02–0.27)
BILIRUB SERPL-MCNC: 0.3 MG/DL (ref 0–1.2)
BILIRUB SERPL-MCNC: <0.2 MG/DL (ref 0–1.2)
BILIRUBIN URINE: NEGATIVE
BLOOD, URINE: ABNORMAL
BUN BLDV-MCNC: 61 MG/DL (ref 6–20)
BUN BLDV-MCNC: 62 MG/DL (ref 6–20)
BUN BLDV-MCNC: 63 MG/DL (ref 6–20)
BUN BLDV-MCNC: 64 MG/DL (ref 6–20)
BUN BLDV-MCNC: 64 MG/DL (ref 6–20)
CALCIUM SERPL-MCNC: 7.8 MG/DL (ref 8.6–10.2)
CALCIUM SERPL-MCNC: 7.8 MG/DL (ref 8.6–10.2)
CALCIUM SERPL-MCNC: 8 MG/DL (ref 8.6–10.2)
CALCIUM SERPL-MCNC: 8.1 MG/DL (ref 8.6–10.2)
CALCIUM SERPL-MCNC: 9.7 MG/DL (ref 8.6–10.2)
CANNABINOID SCREEN URINE: NOT DETECTED
CHLORIDE BLD-SCNC: 81 MMOL/L (ref 98–107)
CHLORIDE BLD-SCNC: 91 MMOL/L (ref 98–107)
CHLORIDE BLD-SCNC: 94 MMOL/L (ref 98–107)
CHLORIDE BLD-SCNC: 96 MMOL/L (ref 98–107)
CHLORIDE BLD-SCNC: 97 MMOL/L (ref 98–107)
CHP ED QC CHECK: >500
CHP ED QC CHECK: NORMAL
CHP ED QC CHECK: NORMAL
CLARITY: ABNORMAL
CO2: 11 MMOL/L (ref 22–29)
CO2: 12 MMOL/L (ref 22–29)
CO2: 16 MMOL/L (ref 22–29)
CO2: 19 MMOL/L (ref 22–29)
CO2: 22 MMOL/L (ref 22–29)
COCAINE METABOLITE SCREEN URINE: NOT DETECTED
COHB: 0.6 % (ref 0–1.5)
COLOR: YELLOW
COMMENT: ABNORMAL
CREAT SERPL-MCNC: 9.1 MG/DL (ref 0.5–1)
CREAT SERPL-MCNC: 9.1 MG/DL (ref 0.5–1)
CREAT SERPL-MCNC: 9.2 MG/DL (ref 0.5–1)
CREAT SERPL-MCNC: 9.3 MG/DL (ref 0.5–1)
CREAT SERPL-MCNC: 9.7 MG/DL (ref 0.5–1)
CRITICAL: ABNORMAL
DATE ANALYZED: ABNORMAL
DATE OF COLLECTION: ABNORMAL
DEVICE: ABNORMAL
EKG ATRIAL RATE: 89 BPM
EKG P AXIS: 67 DEGREES
EKG P-R INTERVAL: 156 MS
EKG Q-T INTERVAL: 408 MS
EKG QRS DURATION: 96 MS
EKG QTC CALCULATION (BAZETT): 496 MS
EKG R AXIS: 59 DEGREES
EKG T AXIS: 26 DEGREES
EKG VENTRICULAR RATE: 89 BPM
ETHANOL: <10 MG/DL (ref 0–0.08)
FENTANYL SCREEN, URINE: NOT DETECTED
FIO2 ARTERIAL: 100
FIO2: 30 %
GFR AFRICAN AMERICAN: 6
GFR NON-AFRICAN AMERICAN: 6 ML/MIN/1.73
GLUCOSE BLD-MCNC: 1206 MG/DL (ref 74–99)
GLUCOSE BLD-MCNC: 381 MG/DL (ref 74–99)
GLUCOSE BLD-MCNC: 541 MG/DL (ref 74–99)
GLUCOSE BLD-MCNC: 698 MG/DL (ref 74–99)
GLUCOSE BLD-MCNC: 842 MG/DL (ref 74–99)
GLUCOSE BLD-MCNC: 982 MG/DL (ref 74–99)
GLUCOSE URINE: >=1000 MG/DL
HCG, URINE, POC: NEGATIVE
HCO3 ARTERIAL: 14 MMOL/L (ref 22–26)
HCO3: 17.5 MMOL/L
HCT VFR BLD CALC: 28.9 % (ref 34–48)
HEMOGLOBIN: 8.2 G/DL (ref 11.5–15.5)
HHB: 22.6 %
KETONES, URINE: 40 MG/DL
LAB: ABNORMAL
LACTIC ACID, SEPSIS: 10.3 MMOL/L (ref 0.5–1.9)
LACTIC ACID, SEPSIS: 12.6 MMOL/L (ref 0.5–1.9)
LEUKOCYTE ESTERASE, URINE: ABNORMAL
Lab: ABNORMAL
Lab: ABNORMAL
Lab: NORMAL
MAGNESIUM: 1.9 MG/DL (ref 1.6–2.6)
MAGNESIUM: 2.1 MG/DL (ref 1.6–2.6)
MAGNESIUM: 2.4 MG/DL (ref 1.6–2.6)
MCH RBC QN AUTO: 29.5 PG (ref 26–35)
MCHC RBC AUTO-ENTMCNC: 28.4 % (ref 32–34.5)
MCV RBC AUTO: 104 FL (ref 80–99.9)
METER GLUCOSE: 259 MG/DL (ref 74–99)
METER GLUCOSE: 326 MG/DL (ref 74–99)
METER GLUCOSE: 411 MG/DL (ref 74–99)
METER GLUCOSE: 455 MG/DL (ref 74–99)
METER GLUCOSE: 477 MG/DL (ref 74–99)
METER GLUCOSE: >500 MG/DL (ref 74–99)
METHADONE SCREEN, URINE: NOT DETECTED
METHB: 0.2 % (ref 0–1.5)
MODE: ABNORMAL
MODE: AC
NEGATIVE QC PASS/FAIL: NORMAL
NITRITE, URINE: NEGATIVE
O2 CONTENT: 8.8 ML/DL
O2 SATURATION: 100 % (ref 92–98.5)
O2 SATURATION: 77.2 %
O2HB: 76.6 %
OPERATOR ID: 7490
OPERATOR ID: ABNORMAL
OPIATE SCREEN URINE: NOT DETECTED
OXYCODONE URINE: POSITIVE
PATIENT TEMP: 37
PATIENT TEMP: 37 C
PCO2 (TEMP CORRECTED): 34 MMHG (ref 35–45)
PCO2: 43.8 MMHG (ref 40–52)
PDW BLD-RTO: 17.1 FL (ref 11.5–15)
PEEP/CPAP: 5 CMH2O
PFO2: 1.64 MMHG/%
PH (TEMPERATURE CORRECTED): 7.22 (ref 7.35–7.45)
PH BLOOD GAS: 7.22 (ref 7.3–7.42)
PH UA: 6.5 (ref 5–9)
PHENCYCLIDINE SCREEN URINE: NOT DETECTED
PHOSPHORUS: 10.7 MG/DL (ref 2.5–4.5)
PHOSPHORUS: 11.1 MG/DL (ref 2.5–4.5)
PLATELET # BLD: 339 E9/L (ref 130–450)
PMV BLD AUTO: 10.4 FL (ref 7–12)
PO2 (TEMP CORRECTED): 483.7 MMHG (ref 60–100)
PO2: 49.2 MMHG (ref 30–50)
POSITIVE END EXP PRESS: 5 CMH2O
POSITIVE QC PASS/FAIL: NORMAL
POTASSIUM REFLEX MAGNESIUM: 5.4 MMOL/L (ref 3.5–5)
POTASSIUM SERPL-SCNC: 4.9 MMOL/L (ref 3.5–5)
POTASSIUM SERPL-SCNC: 5.1 MMOL/L (ref 3.5–5)
POTASSIUM SERPL-SCNC: 5.2 MMOL/L (ref 3.5–5)
POTASSIUM SERPL-SCNC: 7 MMOL/L (ref 3.5–5)
PROTEIN UA: >=300 MG/DL
RBC # BLD: 2.78 E12/L (ref 3.5–5.5)
RBC UA: ABNORMAL /HPF (ref 0–2)
RESPIRATORY RATE: 20 B/MIN
RI(T): 2.15
SALICYLATE, SERUM: 1.2 MG/DL (ref 0–30)
SARS-COV-2, NAAT: NOT DETECTED
SODIUM BLD-SCNC: 127 MMOL/L (ref 132–146)
SODIUM BLD-SCNC: 130 MMOL/L (ref 132–146)
SODIUM BLD-SCNC: 132 MMOL/L (ref 132–146)
SODIUM BLD-SCNC: 133 MMOL/L (ref 132–146)
SODIUM BLD-SCNC: 135 MMOL/L (ref 132–146)
SOURCE, BLOOD GAS: ABNORMAL
SOURCE, BLOOD GAS: ABNORMAL
SPECIFIC GRAVITY UA: 1.01 (ref 1–1.03)
T4 FREE: 1.08 NG/DL (ref 0.93–1.7)
THB: 8.1 G/DL (ref 11.5–16.5)
TIDAL VOLUME: 450 ML
TIME ANALYZED: 1522
TOTAL CK: 120 U/L (ref 20–180)
TOTAL PROTEIN: 4.4 G/DL (ref 6.4–8.3)
TOTAL PROTEIN: 5.5 G/DL (ref 6.4–8.3)
TRICYCLIC ANTIDEPRESSANTS SCREEN SERUM: NEGATIVE NG/ML
TROPONIN: 0.22 NG/ML (ref 0–0.03)
TSH SERPL DL<=0.05 MIU/L-ACNC: 7.94 UIU/ML (ref 0.27–4.2)
UROBILINOGEN, URINE: 0.2 E.U./DL
WBC # BLD: 8.1 E9/L (ref 4.5–11.5)
WBC UA: ABNORMAL /HPF (ref 0–5)

## 2021-02-15 PROCEDURE — 6360000002 HC RX W HCPCS

## 2021-02-15 PROCEDURE — 96365 THER/PROPH/DIAG IV INF INIT: CPT

## 2021-02-15 PROCEDURE — 87040 BLOOD CULTURE FOR BACTERIA: CPT

## 2021-02-15 PROCEDURE — 2580000003 HC RX 258: Performed by: INTERNAL MEDICINE

## 2021-02-15 PROCEDURE — 84100 ASSAY OF PHOSPHORUS: CPT

## 2021-02-15 PROCEDURE — 6360000002 HC RX W HCPCS: Performed by: INTERNAL MEDICINE

## 2021-02-15 PROCEDURE — 82550 ASSAY OF CK (CPK): CPT

## 2021-02-15 PROCEDURE — 2500000003 HC RX 250 WO HCPCS: Performed by: EMERGENCY MEDICINE

## 2021-02-15 PROCEDURE — 71045 X-RAY EXAM CHEST 1 VIEW: CPT

## 2021-02-15 PROCEDURE — 36556 INSERT NON-TUNNEL CV CATH: CPT

## 2021-02-15 PROCEDURE — 80179 DRUG ASSAY SALICYLATE: CPT

## 2021-02-15 PROCEDURE — 3E1M39Z IRRIGATION OF PERITONEAL CAVITY USING DIALYSATE, PERCUTANEOUS APPROACH: ICD-10-PCS | Performed by: INTERNAL MEDICINE

## 2021-02-15 PROCEDURE — 82947 ASSAY GLUCOSE BLOOD QUANT: CPT

## 2021-02-15 PROCEDURE — 6370000000 HC RX 637 (ALT 250 FOR IP): Performed by: EMERGENCY MEDICINE

## 2021-02-15 PROCEDURE — 82803 BLOOD GASES ANY COMBINATION: CPT

## 2021-02-15 PROCEDURE — 90945 DIALYSIS ONE EVALUATION: CPT | Performed by: INTERNAL MEDICINE

## 2021-02-15 PROCEDURE — 2580000003 HC RX 258: Performed by: STUDENT IN AN ORGANIZED HEALTH CARE EDUCATION/TRAINING PROGRAM

## 2021-02-15 PROCEDURE — 6360000002 HC RX W HCPCS: Performed by: EMERGENCY MEDICINE

## 2021-02-15 PROCEDURE — 93005 ELECTROCARDIOGRAM TRACING: CPT | Performed by: STUDENT IN AN ORGANIZED HEALTH CARE EDUCATION/TRAINING PROGRAM

## 2021-02-15 PROCEDURE — 2580000003 HC RX 258: Performed by: EMERGENCY MEDICINE

## 2021-02-15 PROCEDURE — 85027 COMPLETE CBC AUTOMATED: CPT

## 2021-02-15 PROCEDURE — 82077 ASSAY SPEC XCP UR&BREATH IA: CPT

## 2021-02-15 PROCEDURE — 70450 CT HEAD/BRAIN W/O DYE: CPT

## 2021-02-15 PROCEDURE — 83605 ASSAY OF LACTIC ACID: CPT

## 2021-02-15 PROCEDURE — 84484 ASSAY OF TROPONIN QUANT: CPT

## 2021-02-15 PROCEDURE — 31500 INSERT EMERGENCY AIRWAY: CPT

## 2021-02-15 PROCEDURE — 74018 RADEX ABDOMEN 1 VIEW: CPT

## 2021-02-15 PROCEDURE — 80053 COMPREHEN METABOLIC PANEL: CPT

## 2021-02-15 PROCEDURE — 80048 BASIC METABOLIC PNL TOTAL CA: CPT

## 2021-02-15 PROCEDURE — 80307 DRUG TEST PRSMV CHEM ANLYZR: CPT

## 2021-02-15 PROCEDURE — 96366 THER/PROPH/DIAG IV INF ADDON: CPT

## 2021-02-15 PROCEDURE — U0002 COVID-19 LAB TEST NON-CDC: HCPCS

## 2021-02-15 PROCEDURE — 96367 TX/PROPH/DG ADDL SEQ IV INF: CPT

## 2021-02-15 PROCEDURE — 96375 TX/PRO/DX INJ NEW DRUG ADDON: CPT

## 2021-02-15 PROCEDURE — 0BH18EZ INSERTION OF ENDOTRACHEAL AIRWAY INTO TRACHEA, VIA NATURAL OR ARTIFICIAL OPENING ENDOSCOPIC: ICD-10-PCS | Performed by: EMERGENCY MEDICINE

## 2021-02-15 PROCEDURE — 6360000002 HC RX W HCPCS: Performed by: STUDENT IN AN ORGANIZED HEALTH CARE EDUCATION/TRAINING PROGRAM

## 2021-02-15 PROCEDURE — 2500000003 HC RX 250 WO HCPCS

## 2021-02-15 PROCEDURE — 99291 CRITICAL CARE FIRST HOUR: CPT

## 2021-02-15 PROCEDURE — 82962 GLUCOSE BLOOD TEST: CPT

## 2021-02-15 PROCEDURE — 36415 COLL VENOUS BLD VENIPUNCTURE: CPT

## 2021-02-15 PROCEDURE — 82010 KETONE BODYS QUAN: CPT

## 2021-02-15 PROCEDURE — 99223 1ST HOSP IP/OBS HIGH 75: CPT | Performed by: INTERNAL MEDICINE

## 2021-02-15 PROCEDURE — 81001 URINALYSIS AUTO W/SCOPE: CPT

## 2021-02-15 PROCEDURE — 82805 BLOOD GASES W/O2 SATURATION: CPT

## 2021-02-15 PROCEDURE — 84443 ASSAY THYROID STIM HORMONE: CPT

## 2021-02-15 PROCEDURE — 80143 DRUG ASSAY ACETAMINOPHEN: CPT

## 2021-02-15 PROCEDURE — 99285 EMERGENCY DEPT VISIT HI MDM: CPT

## 2021-02-15 PROCEDURE — 96368 THER/DIAG CONCURRENT INF: CPT

## 2021-02-15 PROCEDURE — 06HY33Z INSERTION OF INFUSION DEVICE INTO LOWER VEIN, PERCUTANEOUS APPROACH: ICD-10-PCS | Performed by: INTERNAL MEDICINE

## 2021-02-15 PROCEDURE — 83735 ASSAY OF MAGNESIUM: CPT

## 2021-02-15 PROCEDURE — 5A1945Z RESPIRATORY VENTILATION, 24-96 CONSECUTIVE HOURS: ICD-10-PCS | Performed by: EMERGENCY MEDICINE

## 2021-02-15 PROCEDURE — 2000000000 HC ICU R&B

## 2021-02-15 PROCEDURE — 84439 ASSAY OF FREE THYROXINE: CPT

## 2021-02-15 PROCEDURE — 6370000000 HC RX 637 (ALT 250 FOR IP): Performed by: INTERNAL MEDICINE

## 2021-02-15 PROCEDURE — 6370000000 HC RX 637 (ALT 250 FOR IP): Performed by: STUDENT IN AN ORGANIZED HEALTH CARE EDUCATION/TRAINING PROGRAM

## 2021-02-15 RX ORDER — PROPOFOL 10 MG/ML
5-50 INJECTION, EMULSION INTRAVENOUS
Status: DISCONTINUED | OUTPATIENT
Start: 2021-02-15 | End: 2021-02-16

## 2021-02-15 RX ORDER — CALCIUM CHLORIDE 100 MG/ML
INJECTION INTRAVENOUS; INTRAVENTRICULAR DAILY PRN
Status: COMPLETED | OUTPATIENT
Start: 2021-02-15 | End: 2021-02-15

## 2021-02-15 RX ORDER — 0.9 % SODIUM CHLORIDE 0.9 %
1000 INTRAVENOUS SOLUTION INTRAVENOUS ONCE
Status: COMPLETED | OUTPATIENT
Start: 2021-02-15 | End: 2021-02-15

## 2021-02-15 RX ORDER — EPINEPHRINE 0.1 MG/ML
SYRINGE (ML) INJECTION DAILY PRN
Status: COMPLETED | OUTPATIENT
Start: 2021-02-15 | End: 2021-02-15

## 2021-02-15 RX ORDER — POLYETHYLENE GLYCOL 3350 17 G/17G
17 POWDER, FOR SOLUTION ORAL DAILY PRN
Status: DISCONTINUED | OUTPATIENT
Start: 2021-02-15 | End: 2021-02-27 | Stop reason: HOSPADM

## 2021-02-15 RX ORDER — ATORVASTATIN CALCIUM 40 MG/1
40 TABLET, FILM COATED ORAL NIGHTLY
Status: DISCONTINUED | OUTPATIENT
Start: 2021-02-15 | End: 2021-02-16

## 2021-02-15 RX ORDER — GABAPENTIN 100 MG/1
200 CAPSULE ORAL 2 TIMES DAILY
Status: ON HOLD | COMMUNITY
End: 2021-02-27 | Stop reason: HOSPADM

## 2021-02-15 RX ORDER — SODIUM CHLORIDE 9 MG/ML
INJECTION, SOLUTION INTRAVENOUS ONCE
Status: COMPLETED | OUTPATIENT
Start: 2021-02-15 | End: 2021-02-15

## 2021-02-15 RX ORDER — LEVOTHYROXINE SODIUM 0.07 MG/1
75 TABLET ORAL
Status: DISCONTINUED | OUTPATIENT
Start: 2021-02-16 | End: 2021-02-27 | Stop reason: HOSPADM

## 2021-02-15 RX ORDER — SODIUM CHLORIDE 450 MG/100ML
INJECTION, SOLUTION INTRAVENOUS CONTINUOUS
Status: DISCONTINUED | OUTPATIENT
Start: 2021-02-15 | End: 2021-02-16

## 2021-02-15 RX ORDER — ACETAMINOPHEN 650 MG/1
650 SUPPOSITORY RECTAL EVERY 6 HOURS PRN
Status: DISCONTINUED | OUTPATIENT
Start: 2021-02-15 | End: 2021-02-27 | Stop reason: HOSPADM

## 2021-02-15 RX ORDER — PROPOFOL 10 MG/ML
INJECTION, EMULSION INTRAVENOUS
Status: DISPENSED
Start: 2021-02-15 | End: 2021-02-15

## 2021-02-15 RX ORDER — FENTANYL CITRATE 50 UG/ML
100 INJECTION, SOLUTION INTRAMUSCULAR; INTRAVENOUS ONCE
Status: COMPLETED | OUTPATIENT
Start: 2021-02-15 | End: 2021-02-15

## 2021-02-15 RX ORDER — DEXTROSE MONOHYDRATE 25 G/50ML
12.5 INJECTION, SOLUTION INTRAVENOUS PRN
Status: DISCONTINUED | OUTPATIENT
Start: 2021-02-15 | End: 2021-02-27 | Stop reason: HOSPADM

## 2021-02-15 RX ORDER — FENTANYL CITRATE 50 UG/ML
INJECTION, SOLUTION INTRAMUSCULAR; INTRAVENOUS
Status: COMPLETED
Start: 2021-02-15 | End: 2021-02-15

## 2021-02-15 RX ORDER — ACETAMINOPHEN 325 MG/1
650 TABLET ORAL EVERY 6 HOURS PRN
Status: DISCONTINUED | OUTPATIENT
Start: 2021-02-15 | End: 2021-02-27 | Stop reason: HOSPADM

## 2021-02-15 RX ORDER — SODIUM CHLORIDE 0.9 % (FLUSH) 0.9 %
10 SYRINGE (ML) INJECTION EVERY 12 HOURS SCHEDULED
Status: DISCONTINUED | OUTPATIENT
Start: 2021-02-15 | End: 2021-02-27 | Stop reason: HOSPADM

## 2021-02-15 RX ORDER — HEPARIN SODIUM 5000 [USP'U]/ML
5000 INJECTION, SOLUTION INTRAVENOUS; SUBCUTANEOUS EVERY 8 HOURS SCHEDULED
Status: DISCONTINUED | OUTPATIENT
Start: 2021-02-15 | End: 2021-02-27 | Stop reason: HOSPADM

## 2021-02-15 RX ORDER — LOSARTAN POTASSIUM 50 MG/1
50 TABLET ORAL DAILY
Status: ON HOLD | COMMUNITY
End: 2021-02-27 | Stop reason: HOSPADM

## 2021-02-15 RX ORDER — SODIUM CHLORIDE 0.9 % (FLUSH) 0.9 %
10 SYRINGE (ML) INJECTION PRN
Status: DISCONTINUED | OUTPATIENT
Start: 2021-02-15 | End: 2021-02-27 | Stop reason: HOSPADM

## 2021-02-15 RX ORDER — NICOTINE POLACRILEX 4 MG
15 LOZENGE BUCCAL PRN
Status: DISCONTINUED | OUTPATIENT
Start: 2021-02-15 | End: 2021-02-27 | Stop reason: HOSPADM

## 2021-02-15 RX ORDER — DEXTROSE MONOHYDRATE 50 MG/ML
100 INJECTION, SOLUTION INTRAVENOUS PRN
Status: DISCONTINUED | OUTPATIENT
Start: 2021-02-15 | End: 2021-02-27 | Stop reason: HOSPADM

## 2021-02-15 RX ORDER — ROPINIROLE 0.25 MG/1
0.25 TABLET, FILM COATED ORAL NIGHTLY
COMMUNITY

## 2021-02-15 RX ORDER — DEXTROSE, SODIUM CHLORIDE, AND POTASSIUM CHLORIDE 5; .45; .3 G/100ML; G/100ML; G/100ML
INJECTION INTRAVENOUS CONTINUOUS
Status: DISCONTINUED | OUTPATIENT
Start: 2021-02-15 | End: 2021-02-15

## 2021-02-15 RX ADMIN — SODIUM CHLORIDE: 9 INJECTION, SOLUTION INTRAVENOUS at 10:31

## 2021-02-15 RX ADMIN — SODIUM CHLORIDE 0.1 UNITS/KG/HR: 9 INJECTION, SOLUTION INTRAVENOUS at 10:43

## 2021-02-15 RX ADMIN — CALCIUM CHLORIDE 1000 MG: 100 INJECTION, SOLUTION INTRAVENOUS at 08:13

## 2021-02-15 RX ADMIN — FENTANYL CITRATE 100 MCG: 50 INJECTION, SOLUTION INTRAMUSCULAR; INTRAVENOUS at 10:37

## 2021-02-15 RX ADMIN — CEFTRIAXONE SODIUM 1000 MG: 1 INJECTION, POWDER, FOR SOLUTION INTRAMUSCULAR; INTRAVENOUS at 10:31

## 2021-02-15 RX ADMIN — ATORVASTATIN CALCIUM 40 MG: 40 TABLET, FILM COATED ORAL at 22:07

## 2021-02-15 RX ADMIN — SODIUM CHLORIDE 1000 ML: 9 INJECTION, SOLUTION INTRAVENOUS at 08:18

## 2021-02-15 RX ADMIN — HEPARIN SODIUM 5000 UNITS: 5000 INJECTION INTRAVENOUS; SUBCUTANEOUS at 22:07

## 2021-02-15 RX ADMIN — FENTANYL CITRATE 100 MCG: 50 INJECTION INTRAMUSCULAR; INTRAVENOUS at 10:37

## 2021-02-15 RX ADMIN — VANCOMYCIN HYDROCHLORIDE 1500 MG: 10 INJECTION, POWDER, LYOPHILIZED, FOR SOLUTION INTRAVENOUS at 17:45

## 2021-02-15 RX ADMIN — SODIUM CHLORIDE: 4.5 INJECTION, SOLUTION INTRAVENOUS at 14:15

## 2021-02-15 RX ADMIN — SODIUM CHLORIDE 1000 ML: 9 INJECTION, SOLUTION INTRAVENOUS at 10:50

## 2021-02-15 RX ADMIN — EPINEPHRINE 1 MG: 0.1 INJECTION, SOLUTION ENDOTRACHEAL; INTRACARDIAC; INTRAVENOUS at 08:10

## 2021-02-15 RX ADMIN — SODIUM CHLORIDE 1000 ML: 9 INJECTION, SOLUTION INTRAVENOUS at 09:00

## 2021-02-15 RX ADMIN — SODIUM CHLORIDE 1000 ML: 9 INJECTION, SOLUTION INTRAVENOUS at 09:42

## 2021-02-15 RX ADMIN — PROPOFOL 10 MCG/KG/MIN: 10 INJECTION, EMULSION INTRAVENOUS at 23:41

## 2021-02-15 RX ADMIN — SODIUM BICARBONATE 50 MEQ: 84 INJECTION, SOLUTION INTRAVENOUS at 08:37

## 2021-02-15 RX ADMIN — FENTANYL CITRATE 100 MCG: 50 INJECTION, SOLUTION INTRAMUSCULAR; INTRAVENOUS at 09:22

## 2021-02-15 RX ADMIN — SODIUM CHLORIDE: 4.5 INJECTION, SOLUTION INTRAVENOUS at 21:07

## 2021-02-15 RX ADMIN — FENTANYL CITRATE 12.5 MCG/HR: 50 INJECTION, SOLUTION INTRAMUSCULAR; INTRAVENOUS at 10:54

## 2021-02-15 RX ADMIN — Medication 10 ML: at 21:08

## 2021-02-15 RX ADMIN — Medication 10 UNITS: at 08:21

## 2021-02-15 RX ADMIN — SODIUM BICARBONATE 50 MEQ: 84 INJECTION, SOLUTION INTRAVENOUS at 08:16

## 2021-02-15 RX ADMIN — HEPARIN SODIUM 5000 UNITS: 5000 INJECTION INTRAVENOUS; SUBCUTANEOUS at 17:44

## 2021-02-15 RX ADMIN — CALCIUM CHLORIDE 1000 MG: 100 INJECTION, SOLUTION INTRAVENOUS at 08:17

## 2021-02-15 RX ADMIN — PROPOFOL 10 MCG/KG/MIN: 10 INJECTION, EMULSION INTRAVENOUS at 08:32

## 2021-02-15 ASSESSMENT — PULMONARY FUNCTION TESTS
PIF_VALUE: 17
PIF_VALUE: 21
PIF_VALUE: 19
PIF_VALUE: 24
PIF_VALUE: 20

## 2021-02-15 ASSESSMENT — PAIN SCALES - GENERAL
PAINLEVEL_OUTOF10: 0
PAINLEVEL_OUTOF10: 0

## 2021-02-15 NOTE — ED NOTES
Bed: 03  Expected date:   Expected time:   Means of arrival:   Comments:  EMT     Yoandy Perkins RN  02/15/21 [id number]

## 2021-02-15 NOTE — ED NOTES
Suctioned the patient through the ETT and got a scant amount of clear mucous.      Vita Angulo RN  02/15/21 2490

## 2021-02-15 NOTE — H&P
[address] [place], [place] [address]ist Group   History and Physical      CHIEF COMPLAINT: Unresponsive, cardiac arrest    History of Present Illness:  31 y.o. female with a history of end-stage renal disease on peritoneal dialysis, uncontrolled type 1 diabetes mellitus, uncontrolled hypertension, hypothyroidism presents unresponsive with PEA cardiac arrest. Patient was brought in by squad after being found unresponsive at home. On arrival in the ED, pulse was not detected with the patient had 2 rounds of CPR before achieving ROSC. EKG at that time showed peaked T waves in multiple leads concerning for hyperkalemia. During the code she received 1 dose of epinephrine, 1 amp of bicarbonate, as well as calcium gluconate and insulin. Blood glucose on fingerstick was read as high. BMP that was drawn after the code showed potassium of 5.4, anion gap of 27, severe transaminitis, and urinalysis suggestive of UTI. At this time she is intubated, sedated, but does open eyes to voice.     Informant(s) for H&P: ED physician and chart review    REVIEW OF SYSTEMS:  Unable to obtain review of systems as patient is intubated and sedated      PMH:  Past Medical History:   Diagnosis Date    Acute congestive heart failure ([address]     BC (acute kidney injury) ([address] 10/1/2019    Cephalgia 10/9/2019    Chronic kidney disease     Depression     Diabetes mellitus ([address]     Diabetic ketoacidosis ([address] 8/27/2011    Hemodialysis patient ([address]     History of blood transfusion 11/2019    Hyperlipidemia 10/8/2020    Hypothyroidism 10/8/2020    Iron deficiency anemia 10/1/2019    MDRO (multiple drug resistant organisms) resistance     MRSA (methicillin resistant Staphylococcus aureus)     back wound abcess    Non compliance w medication regimen 3/30/2016    Other disorders of kidney and ureter     Pregnancy 3/30/2016    16 weeks    Seizure ([address] 11/20/2020    Severe pre-eclampsia in third trimester 8/22/2016  Shock liver 2/15/2021       Surgical History:  Past Surgical History:   Procedure Laterality Date    BACK SURGERY      abscess     SECTION      x2    CHOLECYSTECTOMY, LAPAROSCOPIC N/A 2019    CHOLECYSTECTOMY LAPAROSCOPIC performed by Stan Hemphill MD at [address] N/A 2018    COLONOSCOPY WITH BIOPSY performed by Erasmo Maloney MD at [address] N/A 2018    COLONOSCOPY WITH BIOPSY performed by Zoila Calles MD at [address]  2012    EF 57%    ECHO COMPL W DOP COLOR FLOW  6/10/2013         EMBOLECTOMY N/A 2020    [address] [place] [address], [facility], [place]UL[place]SA -- REQS ROOM 3 performed by Scarlett Lindsay MD at [address] [facility] N/A 2020    LAPAROSCOPIC INSERTION PERITONEAL DIALYSIS CATHETER performed by Butch Keys MD at [address] [facility] ESOPHAGOGASTRODUODENOSCOPY TRANSORAL DIAGNOSTIC N/A 2018    EGD ESOPHAGOGASTRODUODENOSCOPY performed by Erasmo Maloney MD at [id number] [facility] TRANSESOPHAGEAL ECHOCARDIOGRAM N/A 10/19/2020    TRANSESOPHAGEAL ECHOCARDIOGRAM WITH BUBBLE STUDY performed by Nando Paul MD at [address] [facility] [address] [address]  2018    UPPER GASTROINTESTINAL ENDOSCOPY  2018    EGD BIOPSY performed by Zoila Calles MD at [facility] 10/11/2019    EGD ESOPHAGOGASTRODUODENOSCOPY performed by Gabriel Driver DO at [place] [address]       Medications Prior to Admission:    Prior to Admission medications    Medication Sig Start Date End Date Taking? Authorizing Provider   gabapentin (NEURONTIN) 100 MG capsule Take 200 mg by mouth 2 times daily.    Yes Historical Provider, MD   Insulin Pump - insulin aspart (patient supplied) Inject into the skin continuous Insulin-to-Carb Ratio (ICR): **  Insulin Sensitivity Factor (ISF): ** mg/dL per unit of insulin  Target Blood Glucose: ** mg/dL family history includes Asthma in her brother and mother; Diabetes in her mother; High Blood Pressure in her father and mother; Hypertension in her mother. PHYSICAL EXAM:  Vitals:  /82   Pulse 79   Temp 93.7 °F (34.3 °C) (Core)   Resp 20   Wt 173 lb 8 oz (78.7 kg)   SpO2 100%   BMI 29.78 kg/m²   General Appearance: Intubated, sedated, opens eyes to voice but does not follow commands  Skin: warm and dry  Head: normocephalic and atraumatic  Eyes: pupils equal, round, and reactive to light, extraocular eye movements intact, conjunctivae normal   Neck: neck supple and non tender without mass   Pulmonary/Chest: Intubated, mechanically ventilated clear to auscultation bilaterally- no wheezes, rales or rhonchi, normal air movement, no respiratory distress  Cardiovascular: normal rate, normal S1 and S2 and no carotid bruits  Abdomen: soft,  non-distended, normal bowel sounds, no masses or organomegaly  Extremities: no cyanosis, no clubbing and no edema. Right femoral triple-lumen central venous catheter in place. Neurologic: Intubated, sedated    LABS:  Recent Labs     02/15/21  0839 02/15/21  1015   * 130*   K 7.0* 5.4*   CL 81* 91*   CO2 11* 12*   BUN 64* 64*   CREATININE 9.7* 9.1*   GLUCOSE 1,206* 982*   CALCIUM 9.7 8.1*       Recent Labs     02/15/21  0839   WBC 8.1   RBC 2.78*   HGB 8.2*   HCT 28.9*   .0*   MCH 29.5   MCHC 28.4*   RDW 17.1*      MPV 10.4       No results for input(s): POCGLU in the last 72 hours.     CBC:   Lab Results   Component Value Date    WBC 8.1 02/15/2021    RBC 2.78 02/15/2021    HGB 8.2 02/15/2021    HCT 28.9 02/15/2021    .0 02/15/2021    MCH 29.5 02/15/2021    MCHC 28.4 02/15/2021    RDW 17.1 02/15/2021     02/15/2021    MPV 10.4 02/15/2021     CMP:    Lab Results   Component Value Date     02/15/2021    K 5.4 02/15/2021    CL 91 02/15/2021    CO2 12 02/15/2021    BUN 64 02/15/2021    CREATININE 9.1 02/15/2021    GFRAA 6 02/15/2021 Result Date: 2/15/2021  EXAMINATION: ONE SUPINE XRAY VIEW(S) OF THE ABDOMEN; ONE XRAY VIEW OF THE CHEST 2/15/2021 8:29 am COMPARISON: None. HISTORY: ORDERING SYSTEM PROVIDED HISTORY: Confirmation of course of NG/OG/NE tube and location of tip of tube TECHNOLOGIST PROVIDED HISTORY: Reason for exam:->Confirmation of course of NG/OG/NE tube and location of tip of tube Portable? ->Yes Abdomen film dated 11/05/2019 and portable chest dated 12/26/2020 FINDINGS: ABDOMEN: There is an NG tube with the tip in the distal stomach. There is mild distention of predominantly small bowel that could be due to ileus or bowel obstruction. Increased density is seen in the lateral aspect of the abdomen bilaterally suggesting the possibility of ascites. Surgical clips are seen in the right upper quadrant. No suspicious intra-abdominal calcifications are identified. The pelvis is excluded from the images. PORTABLE CHEST: There is an ET tube with the tip about 2 cm above the amanda. No acute infiltrate or pleural effusion is seen. The heart and mediastinum are not significantly changed and there is no evidence of vascular congestion. 1. ET tube in satisfactory position. No evidence of acute intrathoracic disease. 2. NG tube tip in the distal stomach. 3. Nonspecific small bowel distension that could be related to ileus or small-bowel obstruction. 4. Suspected ascites.     Xr Abdomen For Ng/og/ne Tube Placement    Result Date: 2/15/2021 EXAMINATION: ONE SUPINE XRAY VIEW(S) OF THE ABDOMEN; ONE XRAY VIEW OF THE CHEST 2/15/2021 8:29 am COMPARISON: None. HISTORY: ORDERING SYSTEM PROVIDED HISTORY: Confirmation of course of NG/OG/NE tube and location of tip of tube TECHNOLOGIST PROVIDED HISTORY: Reason for exam:->Confirmation of course of NG/OG/NE tube and location of tip of tube Portable? ->Yes Abdomen film dated 11/05/2019 and portable chest dated 12/26/2020 FINDINGS: ABDOMEN: There is an NG tube with the tip in the distal stomach. There is mild distention of predominantly small bowel that could be due to ileus or bowel obstruction. Increased density is seen in the lateral aspect of the abdomen bilaterally suggesting the possibility of ascites. Surgical clips are seen in the right upper quadrant. No suspicious intra-abdominal calcifications are identified. The pelvis is excluded from the images. PORTABLE CHEST: There is an ET tube with the tip about 2 cm above the amanda. No acute infiltrate or pleural effusion is seen. The heart and mediastinum are not significantly changed and there is no evidence of vascular congestion. 1. ET tube in satisfactory position. No evidence of acute intrathoracic disease. 2. NG tube tip in the distal stomach. 3. Nonspecific small bowel distension that could be related to ileus or small-bowel obstruction. 4. Suspected ascites. EKG: Sinus rhythm with peaked T waves in the precordial leads    ASSESSMENT/PLAN:      Principal Problem:    Cardiac arrest [facility])  Active Problems:    Diabetic ketoacidosis with coma associated with type 1 diabetes mellitus ([facility]    Diabetes mellitus type 1, uncontrolled ([facility]    Hypertension    ESRD on peritoneal dialysis ([facility]    Shock liver  Resolved Problems:    * No resolved hospital problems. *      1.  PEA cardiac arrest with successful resuscitation  -Admit to ICU

## 2021-02-15 NOTE — ED PROVIDER NOTES
Department of Emergency Medicine   ED  Provider Note  Admit Date/RoomTime: 2/15/2021  8:07 AM  ED Room: [facility]/IC10-01                2/15/21  8:29 AM EST      HISTORY OF PRESENT ILLNESS:  (Nurses Notes Reviewed)    Chief Complaint:   Cardiac Arrest (to er via ems from home. the call was for unresponsiveness. ems state the pt arrested as they arrived here. initial rhythm was PEA)      Source of history provided by:  EMS personnel. History/Exam Limitations: due to condition. Darnell Kennedy is a 31 y.o. old female presenting to the emergency department, for cardiac arrest, which occured upon arrival here in the emergency department. Upon arrival here EMS was bag valve masking the patient. EMS did report they were called for concern of possible seizure. Upon arrival her glucose was reported to be too high to read. Upon arrival here she was had a GCS of 3. She was intubated and IO was performed as she did not have IV access and were unable to obtain peripherals. She was then intubated and placed on the ventilator and a central line was then placed. She has a history significant for diabetes, hypertension, end-stage renal disease on peritoneal dialysis, illicit drug use, hypothyroidism, endocarditis. Code Status on file: Full Code. Initial Rhythm: Peak T waves seen on rhythm strip.     PAST MEDICAL, SURGICAL, SOCIAL AND FAMILY HISTORY SECTION    Past Medical History:  has a past medical history of Acute congestive heart failure ([address] BC (acute kidney injury) ([address] Cephalgia, Chronic kidney disease, Depression, Diabetes mellitus ([address] Diabetic ketoacidosis ([address] Hemodialysis patient ([address] History of blood transfusion, Hyperlipidemia, Hypothyroidism, Iron deficiency anemia, MDRO (multiple drug resistant organisms) resistance, MRSA (methicillin resistant Staphylococcus aureus), Non compliance w medication regimen, Other disorders of kidney and ureter, Pregnancy, Seizure ([address] Severe pre-eclampsia in third trimester, and Shock liver. Past Surgical History:  has a past surgical history that includes ECHO Compl W Dop Color Flow (2012); ECHO Compl W Dop Color Flow (6/10/2013);  section; Tunneled venous port placement (2018); pr esophagogastroduodenoscopy transoral diagnostic (N/A, 2018); back surgery; Colonoscopy (N/A, 2018); Colonoscopy (N/A, 2018); Upper gastrointestinal endoscopy (2018); Upper gastrointestinal endoscopy (N/A, 10/11/2019); Cholecystectomy, laparoscopic (N/A, 2019); LAPAROSCOPY INSERTION PERITONEAL CATHETER (N/A, 2020); transesophageal echocardiogram (N/A, 10/19/2020); and embolectomy (N/A, 2020). Social History:  reports that she has been smoking cigarettes. She has a 3.00 pack-year smoking history. She uses smokeless tobacco. She reports that she does not drink alcohol or use drugs. Family History: family history includes Asthma in her brother and mother; Diabetes in her mother; High Blood Pressure in her father and mother; Hypertension in her mother. The patients home medications have been reviewed. Allergies: Cefepime and Toradol [ketorolac tromethamine]    Review of Systems:   Pertinent positives and negatives are stated within HPI, all other systems reviewed and are negative. PHYSICAL EXAM:   General: Unresponsive. Head: Atraumatic. Eyes: Fixed and dilated, at times blinks with the right eye  ENT: Airway patent. ETT in place  Cardiovascular: Heart sounds are Absent. Pulses are Absent. Respiratory: No spontaneous respirations. Equal breath sounds with controlled ventilation. Abdominal: distended. Musculoskeletal: No deformities. Skin: Pallor. Neurological: Unresponsive.  GCS of 3. neurological exam limited due to clinical condition.       ------------------------------------------------ RESULTS ---------------------------------------------------    LABS  Results for orders placed or performed during the hospital encounter of 02/15/21   CBC   Result Value Ref Range    WBC 8.1 4.5 - 11.5 E9/L    RBC 2.78 (L) 3.50 - 5.50 E12/L    Hemoglobin 8.2 (L) 11.5 - 15.5 g/dL    Hematocrit 28.9 (L) 34.0 - 48.0 %    .0 (H) 80.0 - 99.9 fL    MCH 29.5 26.0 - 35.0 pg    MCHC 28.4 (L) 32.0 - 34.5 %    RDW 17.1 (H) 11.5 - 15.0 fL    Platelets 263 217 - 426 E9/L    MPV 10.4 7.0 - 12.0 fL   Comprehensive metabolic panel   Result Value Ref Range    Sodium 127 (L) 132 - 146 mmol/L    Potassium 7.0 (HH) 3.5 - 5.0 mmol/L    Chloride 81 (L) 98 - 107 mmol/L    CO2 11 (L) 22 - 29 mmol/L    Anion Gap 35 (H) 7 - 16 mmol/L    Glucose 1,206 (HH) 74 - 99 mg/dL    BUN 64 (H) 6 - 20 mg/dL    CREATININE 9.7 (HH) 0.5 - 1.0 mg/dL    GFR Non-African American 6 >=60 mL/min/1.73    GFR African American 6     Calcium 9.7 8.6 - 10.2 mg/dL    Total Protein 5.5 (L) 6.4 - 8.3 g/dL    Albumin 2.4 (L) 3.5 - 5.2 g/dL    Total Bilirubin 0.3 0.0 - 1.2 mg/dL    Alkaline Phosphatase 239 (H) 35 - 104 U/L    ALT 1,037 (H) 0 - 32 U/L    AST 1,900 (H) 0 - 31 U/L   Troponin   Result Value Ref Range    Troponin 0.22 (H) 0.00 - 0.03 ng/mL   Magnesium   Result Value Ref Range    Magnesium 2.4 1.6 - 2.6 mg/dL   Beta-Hydroxybutyrate   Result Value Ref Range    Beta-Hydroxybutyrate 1.99 (H) 0.02 - 0.27 mmol/L   TSH without Reflex   Result Value Ref Range    TSH 7.940 (H) 0.270 - 4.200 uIU/mL   T4, Free   Result Value Ref Range    T4 Free 1.08 0.93 - 1.70 ng/dL   Lactate, Sepsis   Result Value Ref Range    Lactic Acid, Sepsis 12.6 (HH) 0.5 - 1.9 mmol/L   Lactate, Sepsis   Result Value Ref Range    Lactic Acid, Sepsis 10.3 (HH) 0.5 - 1.9 mmol/L   Urinalysis   Result Value Ref Range    Color, UA Yellow Straw/Yellow    Clarity, UA TURBID (A) Clear    Glucose, Ur >=1000 (A) Negative mg/dL    Bilirubin Urine Negative Negative    Ketones, Urine 40 (A) Negative mg/dL    Specific Gravity, UA 1.015 1.005 - 1.030    Blood, Urine MODERATE (A) Negative    pH, UA 6.5 5.0 - 9.0 Protein, UA >=300 (A) Negative mg/dL    Urobilinogen, Urine 0.2 <2.0 E.U./dL    Nitrite, Urine Negative Negative    Leukocyte Esterase, Urine SMALL (A) Negative   CK   Result Value Ref Range    Total  20 - 180 U/L   Urine Drug Screen   Result Value Ref Range    Amphetamine Screen, Urine NOT DETECTED Negative <1000 ng/mL    Barbiturate Screen, Ur NOT DETECTED Negative < 200 ng/mL    Benzodiazepine Screen, Urine NOT DETECTED Negative < 200 ng/mL    Cannabinoid Scrn, Ur NOT DETECTED Negative < 50ng/mL    Cocaine Metabolite Screen, Urine NOT DETECTED Negative < 300 ng/mL    Opiate Scrn, Ur NOT DETECTED Negative < 300ng/mL    PCP Screen, Urine NOT DETECTED Negative < 25 ng/mL    Methadone Screen, Urine NOT DETECTED Negative <300 ng/mL    Oxycodone Urine POSITIVE (A) Negative <100 ng/mL    FENTANYL SCREEN, URINE NOT DETECTED Negative <1 ng/mL    Drug Screen Comment: see below    Serum Drug Screen   Result Value Ref Range    Ethanol Lvl <10 mg/dL    Acetaminophen Level <5.0 (L) 10.0 - 15.6 mcg/mL    Salicylate, Serum 1.2 0.0 - 30.0 mg/dL    TCA Scrn negative Cutoff:300 ng/mL   Arterial Blood Gas, Respiratory Only   Result Value Ref Range    Source: Arterial     FIO2 Arterial 100.0     Pt Temp 37.0     PH (TEMPERATURE CORRECTED) 7.223 (L) 7.350 - 7.450    PCO2 (TEMP CORRECTED) 34.0 (L) 35.0 - 45.0 mmHg    PO2 (TEMP CORRECTED) 483.7 (H) 60.0 - 100.0 mmHg    HCO3, Arterial 14.0 (L) 22.0 - 26.0 mmol/L    B.E. -12.6 (L) -3.0 - 3.0 mmol/L    O2 Sat 100.0 (H) 92.0 - 98.5 %     ID 7,490     DEVICE 15,[id number]     Mode AC     Tidal Volume 450 mL    Positive End EXP Press 5 cmH2O    Respiratory Rate 20 b/min   Microscopic Urinalysis   Result Value Ref Range    WBC, UA PACKED (A) 0 - 5 /HPF    RBC, UA 1-3 0 - 2 /HPF    Bacteria, UA MANY (A) None Seen /HPF   Comprehensive Metabolic Panel w/ Reflex to MG   Result Value Ref Range    Sodium 130 (L) 132 - 146 mmol/L    Potassium reflex Magnesium 5.4 (H) 3.5 - 5.0 ms    Q-T Interval 408 ms    QTc Calculation (Bazett) 496 ms    P Axis 67 degrees    R Axis 59 degrees    T Axis 26 degrees       RADIOLOGY  CT HEAD WO CONTRAST   Final Result   No acute intracranial abnormality. XR ABDOMEN FOR NG/OG/NE TUBE PLACEMENT   Final Result   1. ET tube in satisfactory position. No evidence of acute intrathoracic   disease. 2. NG tube tip in the distal stomach. 3. Nonspecific small bowel distension that could be related to ileus or   small-bowel obstruction. 4. Suspected ascites. XR CHEST PORTABLE   Final Result   1. ET tube in satisfactory position. No evidence of acute intrathoracic   disease. 2. NG tube tip in the distal stomach. 3. Nonspecific small bowel distension that could be related to ileus or   small-bowel obstruction. 4. Suspected ascites. EKG Interpretation  Interpreted by me. Rhythm: normal sinus   Rate: normal  Axis: normal  Conduction: normal  ST Segments: nonspecific changes. Peak T waves in multiple leads. Clinical Impression: hyperkalemia  Comparison to Old EKG significant changes have occurred. ---------------------------- NURSING NOTES AND VITALS REVIEWED -------------------------   The nursing notes within the ED encounter and vital signs as below have been reviewed.    /88   Pulse 97   Temp 99.4 °F (37.4 °C) (Axillary)   Resp 17   Ht 5' 4\" (1.626 m)   Wt 173 lb 11.6 oz (78.8 kg)   SpO2 97%   BMI 29.82 kg/m²   Oxygen Saturation Interpretation: Abnormal and Improved after treatment      ------------------------------------------PROGRESS NOTES -------------------------------------------    ED COURSE MEDICATIONS:                Medications   insulin NPH (HUMULIN N;NOVOLIN N) 100 UNIT/ML injection vial (  Canceled Entry 2/15/21 9970)   propofol injection (10 mcg/kg/min × 78.7 kg Intravenous Rate/Dose Change 2/15/21 2109)   glucose (GLUTOSE) 40 % oral gel 15 g (has no administration in time range) dextrose 50 % IV solution (has no administration in time range)   glucagon (rDNA) injection 1 mg (has no administration in time range)   dextrose 5 % solution (has no administration in time range)   insulin regular (HUMULIN R;NOVOLIN R) 100 Units in sodium chloride 0.9 % 100 mL infusion (1.125 Units/hr Intravenous Rate/Dose Change 2/15/21 2215)   fentaNYL 5 mcg/ml in 0.9%  ml infusion (25 mcg/hr Intravenous Rate/Dose Change 2/15/21 2015)   atorvastatin (LIPITOR) tablet 40 mg (40 mg Per NG tube Given 2/15/21 2207)   levothyroxine (SYNTHROID) tablet 75 mcg (has no administration in time range)   sodium chloride flush 0.9 % injection 10 mL (10 mLs Intravenous Given 2/15/21 2108)   sodium chloride flush 0.9 % injection 10 mL (has no administration in time range)   polyethylene glycol (GLYCOLAX) packet 17 g (has no administration in time range)   acetaminophen (TYLENOL) tablet 650 mg (has no administration in time range)     Or   acetaminophen (TYLENOL) suppository 650 mg (has no administration in time range)   heparin (porcine) injection 5,000 Units (5,000 Units Subcutaneous Given 2/15/21 2207)   0.45 % sodium chloride infusion ( Intravenous New Bag 2/15/21 2107)   cefTRIAXone (ROCEPHIN) 1,000 mg in sterile water 10 mL IV syringe (has no administration in time range)   vancomycin (VANCOCIN) intermittent dosing (placeholder) ( Other Canceled Entry 2/15/21 1619)   0.9 % sodium chloride bolus (0 mLs Intravenous Stopped 2/15/21 0900)   EPINEPHrine 1 MG/10ML injection (1 mg Intraosseous Given 2/15/21 0810)   sodium bicarbonate 8.4 % injection (50 mEq Intravenous Given 2/15/21 0837)   calcium chloride 10 % injection (1,000 mg Intravenous Given 2/15/21 0817)   sodium bicarbonate 8.4 % injection (50 mEq Intravenous Given 2/15/21 0816)   insulin regular (HUMULIN R;NOVOLIN R) injection 10 Units (10 Units Intravenous Given 2/15/21 0821)   0.9 % sodium chloride bolus (0 mLs Intravenous Stopped 2/15/21 0940)   fentaNYL (SUBLIMAZE) 100 MCG/2ML injection (100 mcg  Given 2/15/21 0922)   0.9 % sodium chloride bolus (0 mLs Intravenous Stopped 2/15/21 0956)   cefTRIAXone (ROCEPHIN) 1,000 mg in sterile water 10 mL IV syringe (0 mg Intravenous Stopped 2/15/21 1051)   0.9 % sodium chloride infusion ( Intravenous New Bag 2/15/21 1031)   0.9 % sodium chloride bolus (0 mLs Intravenous Stopped 2/15/21 1157)   fentaNYL (SUBLIMAZE) injection 100 mcg (100 mcg Intravenous Given 2/15/21 1037)   vancomycin (VANCOCIN) 1,500 mg in dextrose 5 % 300 mL IVPB (1,500 mg Intravenous New Bag 2/15/21 1745)       Medical Decision Making/ED Course:  ED Course as of Feb 15 2218   Mon Feb 15, 2021   0810 PROCEDURE  2/15/21       Time: 0810  INTRAOSSEOUS IV INSERTION  Risks, benefits and alternatives were not able to be discussed due to the emergent nature requiring IV access. Performed By: staff and myself supervising    Indication: Emergent IV access needed. Informed consent: Unable to be obtained due to patient's condition. .  Procedure: The patient's left tibial ridge was palpated and was followed to the level of the tibial plateau. A 15g intraosseous trochar was inserted into the 1 cm to 2 cm inferior and medial to the tibial tuberosity in the flat portion of the tibia with an IO drill. Blood was aspirated and the line was flushed with NS. The I/O was stabilized using the custom dressing. Complications:   None            [SO]   0820 PROCEDURE  2/15/21       Time: 0820  INTRAOSSEOUS IV INSERTION  Risks, benefits and alternatives were not able to be discussed due to the emergent nature requiring IV access. Performed By: Bashir Duron DO. Indication: Emergent IV access needed. Informed consent: Unable to be obtained due to patient's condition. .  Procedure: The patient's right tibial ridge was palpated and was followed to the level of the tibial plateau.   A 15g intraosseous trochar was inserted into the 1 cm to 2 cm inferior and medial to the tibial tuberosity in the flat portion of the tibia with an IO drill. Blood was aspirated and the line was flushed with NS. The I/O was stabilized using the custom dressing. Complications:  two attempts, both unsuccessful            [SO]   0904 IMPRESSION:  1. ET tube in satisfactory position. No evidence of acute intrathoracic  disease. 2. NG tube tip in the distal stomach. 3. Nonspecific small bowel distension that could be related to ileus or  small-bowel obstruction. 4. Suspected ascites. XR CHEST PORTABLE [WL]   8400 I/O's in place, central line in place.    [SO]   9414 Evaluated bedside. Her pressures have improved from 229/117 to 97/55. We will decrease on the propofol and sedate her with fentanyl boluses    [WL]   0940 Bacteria, UA(!): MANY [WL]   0940 WBC, UA(!): PACKED [WL]   0940 Leukocyte Esterase, Urine(!): SMALL [WL]   0940 Blood pressure declining little bit with the propofol, ordered some fentanyl as she was moving a little bit. [SO]   [id number] Oxycodone Urine(!): POSITIVE [WL]   0959 Lactic Acid, Sepsis(!!): 12.6 [WL]   0959 Troponin(!): 0.22 [WL]   1001 Beta-Hydroxybutyrate(!): 1.99 [WL]   1002 ALT(!): 1,037 [WL]   1002 AST(!): 1,900 [WL]   1018 Pt remains sedated, third liter of normal saline now complete.    [SO]   1050 Patient reevaluated and has been waking up a little more. As to not decrease her pressure too much we will start her on a fentanyl drip concurrent with the propofol.    [WL]   1142 Potassium(!): 5.4 [WL]   1142 Glucose(!!): 982 [WL]   1142 Creatinine(!!): 9.1 [WL]   1142 Anion Gap(!): 27 [WL]   1142 ALT(!): 860 [WL]   1153 Spoke with the intensivist, he agrees to admit the patient to the ICU. [WL]   200 Spoke with the hospitalist, he agrees to admit the patient.    [WL]   1209 Updated the patient's mother.   She will be coming in to see the patient.    [SO]      ED Course User Index  [SO] Mike Moses DO  [WL] Sharonda Chamorro DO     Patient arrived via EMS from reported unknown downtime. Upon arrival here to the ED there were no pulses and CPR was started going to ATLS guidelines. Rhythm strip showed hyperacute T waves. Patient was treated as hyperkalemia. She was given 1 rounds of epi, bicarb, calcium gluconate and insulin. She is reported to be a high sugar unable to read on the glucometer. ROSC was obtained. She was intubated and placed on the ventilator. No IV access was available upon arrival so an IO was placed. Central line was then placed in the right groin. Rocephin was given to treat her UTI. She was given 4 L of IV fluids before starting on his insulin drip for DKA. Patient will be admitted to the ICU. Hospitalist  This patient's ED course included: multiple      PROCEDURES:           Central Line Placement Procedure Note    Indication: vascular access and centrally administered medications    Consent: Unable to be obtained due to the emergent nature of this procedure. Procedure: The patient was positioned appropriately and the skin over the right femoral vein was prepped with chlorhexidine and draped in a sterile fashion. Local anesthesia was not performed due to the emergent nature of this procedure. A large bore needle was used to identify the vein. A guide wire was then inserted into the vein through the needle. A triple lumen catheter was then inserted into the vessel over the guide wire using the Seldinger technique. All ports showed good, free flowing blood return and were flushed with saline solution. The catheter was then securely fastened to the skin with suture at 20 cm. Two sutures were placed into the proximal eyelids. An antibiotic sterile dressing was placed and the site was then covered with a sterile dressing. A post procedure X-ray was not indicated. The patient tolerated the procedure well.     Complications: None    PROCEDURE  2/15/21       Time: 10:18 PM      INTUBATION  Risks, benefits and alternatives if able (for applicable procedures below) described. Performed By: Gabrielle Jaramillo DO. Indication:  Respiratory failure. Informed consent:  Unable to be obtained due to the emergent nature of this procedure. .  Procedure: Following Preoxygenation the patient was pretreated with None followed by None. Intubation was performed after single attempt(s) by direct laryngoscopy using a laryngoscope and 7.5mm cuffed endotracheal tube was inserted . Initial post procedure placement:  confirmed by bilateral breath sounds, ETCO2 detection, and absence of sounds over stomach. Tube Secured @ 23cm at the Lip. Post procedure chest x-ray: has been ordered but is still pending. Procedural Complications: None. Anesthesia Consult:  No.             CRITICAL CARE:  30 min          --------------------------------------- IMPRESSION & DISPOSITION --------------------------------     IMPRESSION:  1. Cardiac arrest ([address]    2. Small bowel obstruction ([address]    3. Acute cystitis with hematuria    4. Hypertensive emergency    5. Diabetic ketoacidosis with coma associated with other specified diabetes mellitus ([address]    6. Transaminitis    7. BC (acute kidney injury) ([address]    8. Acute anemia            DISPOSITION:  Disposition: Admit to CCU/ICU. Patient condition is critical.       Gabrielle Jaramillo DO  Resident  02/15/21 9147      ATTENDING PROVIDER ATTESTATION:     Erick Lance presented to the emergency department for evaluation of Cardiac Arrest (to er via ems from home. the call was for unresponsiveness. ems state the pt arrested as they arrived here. initial rhythm was PEA)   and was initially evaluated by the Medical Resident. See Original ED Note for H&P and ED course above. I have reviewed and discussed the case, including pertinent history (medical, surgical, family and social) and exam findings with the Medical Resident assigned to Erick Lance.   I have personally performed and/or participated in the history, exam, medical decision making, and procedures and agree with all pertinent clinical information. I, Dr. Jean Vitale, am the primary provider of record    EMS initially called for concern for seizure. Patient was staying overnight with her boyfriend. He awoke to her having a seizure but he was never present emergency department to give any additional information. Patient is a known diabetic, EMS reports that she was not breathing very well and they were using bag-valve-mask upon arrival.  I was immediately called into the room upon their arrival based on the patient being unresponsive. She was not breathing on her own, was having only agonal respirations, did not have a palpable pulse. ACLS protocols were immediately started. Unresponsive generally, her whole body   Unresponsive   Severe  Unknown, approximately 30 minutes   Constant Timing   Unknown, while sleeping    ROS -patient clearly unresponsive, agonal respirations only. Unable to obtain review of systems    PHYSICAL EXAM:   General: Unresponsive. Head: Atraumatic. Nose: No signs of bleeding or injury  Eyes: Fixed and dilated, at times blinks with the right eye  ENT: Airway patent. ETT in place  Cardiovascular: Heart sounds are Absent. Pulses are Absent. Respiratory: No spontaneous respirations. Equal breath sounds with controlled ventilation. Abdominal: distended. Musculoskeletal: No deformities. Skin: Pallor. --No other signs of rash  Neurological: Unresponsive. GCS of 3. neurological exam limited due to clinical condition. Bag-valve-mask initially used, oral airway placed. Continued manual respirations done, IO access obtained as the patient is a brittle diabetic with poor venous access. Epinephrine given, chest compressions continued. After 2-3 rounds of chest compressions, epinephrine ROSC was obtained. During this time as she is a known diabetic with history of DKA, all interventions regarding hyperkalemia was started.   A rhythm strip was performed prior to arrival by paramedics showing a wide-complex QRS with peaked T waves consistent with hyperkalemia. Bicarb, calcium, insulin and fluids were immediately given. Blood pressure was high initially after ROSC. Patient placed on propofol but continued did not have any signs of responsiveness. Intubation performed, central line placed, propofol for sedation, multiple checks and rechecks showed that the patient is in DKA, she does have elevated liver enzymes, there is concern for urinary tract infection after Reyes catheter was placed with urine concerning for an infectious appearance. This confirmed with testing, patient admitted to the intensive care unit. CRITICAL CARE:   60 MINUTES. Please note that the withdrawal or failure to initiate urgent interventions for this patient would likely result in a life threatening deterioration or permanent disability. Accordingly this patient received the above mentioned time, excluding separately billable procedures. I have reviewed my findings and recommendations with the assigned Medical Resident, Darnell Kennedy and members of family present at the time of disposition. My findings/plan: The primary encounter diagnosis was Cardiac arrest [facility]). Diagnoses of Small bowel obstruction ([facility] [address] Acute cystitis with hematuria, Hypertensive emergency, Diabetic ketoacidosis with coma associated with other specified diabetes mellitus ([address] Transaminitis, BC (acute kidney injury) ([facility] [address] and Acute anemia were also pertinent to this visit.   Current Discharge Medication List        Allen Dixon, [address], DO  02/16/21 7265

## 2021-02-15 NOTE — PROGRESS NOTES
Pharmacy Consultation Note  (Antibiotic Dosing and Monitoring)    Initial consult date: 2/15/2021  Consulting physician: Dr. Chino Lee  Drug(s): Vancomycin  Indication: Sepsis    Ht Readings from Last 1 Encounters:   02/15/21 5' 4\" (1.626 m)     Wt Readings from Last 1 Encounters:   02/15/21 173 lb 11.6 oz (78.8 kg)     Age/  Gender IBW DW  Allergy Information   31 y.o.  female 54.7 kg 64.3 kg  Cefepime and Toradol [ketorolac tromethamine]         Date  Tmax WBC Dialysis Drug/Dose Time   Given Level(s)   (Time) Comments   2/15  (#1) -- 8.1  Vancomycin 1500 mg IV x 1 <1515>       (#2)            (#3)            (#4)            (#5)            (#6)            (#7)            Estimated Creatinine Clearance: 9 mL/min (A) (based on SCr of 9.1 mg/dL (HH)). UOP over the past 24 hours:     No intake or output data in the 24 hours ending 02/15/21 1444    Anti-infective Regimen:  Anti-infective Dose Date Initiated Date Stopped   Ceftriaxone 1000 mg IV q24hr 2/15      Cultures:  available culture and sensitivity results were reviewed in EPIC  Cultures sent and are pending. Culture Date Result             Assessment:  · Consulted by Dr. Chino Lee to dose/monitor vancomycin  · Goal trough level:  15-20 mcg/mL, AUC/DEENA:   400-600  · Pt is a 31 yoF who presented from home in PEA arrest. Patient has hx of ESRD on peritoneal dialysis, diabetes, in DKA this admission, and multiple hospitalizations. Empiric antibiotics initiated for sepsis. · Hx ESRD, HD on peritoneal dialysis.     Plan:  · Vancomycin 1500 mg IV x 1 today  · Follow dialysis schedule for further dosing and levels  · Pharmacist will follow and monitor/adjust dosing as necessary      Thank you for the consult,    Omid Piedra, PharmD, BCPS 2/15/2021 2:44 PM   Ext: [phone]

## 2021-02-15 NOTE — PROGRESS NOTES
RN called lab inquiring about 1630 STAT random glucose. RN informed the machine that runs it had an error so it is being run again.

## 2021-02-16 LAB
ALBUMIN SERPL-MCNC: 2.1 G/DL (ref 3.5–5.2)
ALP BLD-CCNC: 169 U/L (ref 35–104)
ALT SERPL-CCNC: 580 U/L (ref 0–32)
ANION GAP SERPL CALCULATED.3IONS-SCNC: 15 MMOL/L (ref 7–16)
ANION GAP SERPL CALCULATED.3IONS-SCNC: 15 MMOL/L (ref 7–16)
ANION GAP SERPL CALCULATED.3IONS-SCNC: 16 MMOL/L (ref 7–16)
ANION GAP SERPL CALCULATED.3IONS-SCNC: 16 MMOL/L (ref 7–16)
AST SERPL-CCNC: 808 U/L (ref 0–31)
B.E.: -5.7 MMOL/L (ref -3–3)
BASOPHILS ABSOLUTE: 0.07 E9/L (ref 0–0.2)
BASOPHILS RELATIVE PERCENT: 0.6 % (ref 0–2)
BETA-HYDROXYBUTYRATE: 0.12 MMOL/L (ref 0.02–0.27)
BILIRUB SERPL-MCNC: <0.2 MG/DL (ref 0–1.2)
BILIRUBIN DIRECT: <0.2 MG/DL (ref 0–0.3)
BILIRUBIN, INDIRECT: ABNORMAL MG/DL (ref 0–1)
BUN BLDV-MCNC: 50 MG/DL (ref 6–20)
BUN BLDV-MCNC: 51 MG/DL (ref 6–20)
BUN BLDV-MCNC: 55 MG/DL (ref 6–20)
BUN BLDV-MCNC: 61 MG/DL (ref 6–20)
CALCIUM SERPL-MCNC: 7.1 MG/DL (ref 8.6–10.2)
CALCIUM SERPL-MCNC: 7.4 MG/DL (ref 8.6–10.2)
CALCIUM SERPL-MCNC: 7.4 MG/DL (ref 8.6–10.2)
CALCIUM SERPL-MCNC: 7.7 MG/DL (ref 8.6–10.2)
CHLORIDE BLD-SCNC: 96 MMOL/L (ref 98–107)
CHLORIDE BLD-SCNC: 98 MMOL/L (ref 98–107)
CHLORIDE BLD-SCNC: 98 MMOL/L (ref 98–107)
CHLORIDE BLD-SCNC: 99 MMOL/L (ref 98–107)
CO2: 18 MMOL/L (ref 22–29)
CO2: 21 MMOL/L (ref 22–29)
COHB: 0.3 % (ref 0–1.5)
CREAT SERPL-MCNC: 7.9 MG/DL (ref 0.5–1)
CREAT SERPL-MCNC: 8 MG/DL (ref 0.5–1)
CREAT SERPL-MCNC: 8.5 MG/DL (ref 0.5–1)
CREAT SERPL-MCNC: 8.9 MG/DL (ref 0.5–1)
CRITICAL: ABNORMAL
DATE ANALYZED: ABNORMAL
DATE OF COLLECTION: ABNORMAL
EOSINOPHILS ABSOLUTE: 0.87 E9/L (ref 0.05–0.5)
EOSINOPHILS RELATIVE PERCENT: 6.9 % (ref 0–6)
FIO2: 30 %
GFR AFRICAN AMERICAN: 6
GFR AFRICAN AMERICAN: 7
GFR NON-AFRICAN AMERICAN: 6 ML/MIN/1.73
GFR NON-AFRICAN AMERICAN: 7 ML/MIN/1.73
GLUCOSE BLD-MCNC: 133 MG/DL (ref 74–99)
GLUCOSE BLD-MCNC: 192 MG/DL (ref 74–99)
GLUCOSE BLD-MCNC: 196 MG/DL (ref 74–99)
GLUCOSE BLD-MCNC: 68 MG/DL (ref 74–99)
HCO3: 19 MMOL/L (ref 22–26)
HCT VFR BLD CALC: 20.4 % (ref 34–48)
HCT VFR BLD CALC: 22.8 % (ref 34–48)
HEMOGLOBIN: 6.9 G/DL (ref 11.5–15.5)
HEMOGLOBIN: 7.6 G/DL (ref 11.5–15.5)
HHB: 6.2 % (ref 0–5)
IMMATURE GRANULOCYTES #: 0.07 E9/L
IMMATURE GRANULOCYTES %: 0.6 % (ref 0–5)
INR BLD: 1.3
LAB: ABNORMAL
LYMPHOCYTES ABSOLUTE: 2.94 E9/L (ref 1.5–4)
LYMPHOCYTES RELATIVE PERCENT: 23.3 % (ref 20–42)
Lab: ABNORMAL
MAGNESIUM: 1.5 MG/DL (ref 1.6–2.6)
MAGNESIUM: 1.6 MG/DL (ref 1.6–2.6)
MAGNESIUM: 1.6 MG/DL (ref 1.6–2.6)
MAGNESIUM: 1.8 MG/DL (ref 1.6–2.6)
MCH RBC QN AUTO: 29.2 PG (ref 26–35)
MCH RBC QN AUTO: 29.7 PG (ref 26–35)
MCHC RBC AUTO-ENTMCNC: 33.3 % (ref 32–34.5)
MCHC RBC AUTO-ENTMCNC: 33.8 % (ref 32–34.5)
MCV RBC AUTO: 87.7 FL (ref 80–99.9)
MCV RBC AUTO: 87.9 FL (ref 80–99.9)
METER GLUCOSE: 107 MG/DL (ref 74–99)
METER GLUCOSE: 109 MG/DL (ref 74–99)
METER GLUCOSE: 118 MG/DL (ref 74–99)
METER GLUCOSE: 131 MG/DL (ref 74–99)
METER GLUCOSE: 132 MG/DL (ref 74–99)
METER GLUCOSE: 133 MG/DL (ref 74–99)
METER GLUCOSE: 135 MG/DL (ref 74–99)
METER GLUCOSE: 148 MG/DL (ref 74–99)
METER GLUCOSE: 151 MG/DL (ref 74–99)
METER GLUCOSE: 159 MG/DL (ref 74–99)
METER GLUCOSE: 212 MG/DL (ref 74–99)
METER GLUCOSE: 218 MG/DL (ref 74–99)
METER GLUCOSE: 254 MG/DL (ref 74–99)
METER GLUCOSE: 75 MG/DL (ref 74–99)
METER GLUCOSE: 96 MG/DL (ref 74–99)
METHB: 0.1 % (ref 0–1.5)
MODE: ABNORMAL
MONOCYTES ABSOLUTE: 0.86 E9/L (ref 0.1–0.95)
MONOCYTES RELATIVE PERCENT: 6.8 % (ref 2–12)
NEUTROPHILS ABSOLUTE: 7.8 E9/L (ref 1.8–7.3)
NEUTROPHILS RELATIVE PERCENT: 61.8 % (ref 43–80)
O2 CONTENT: 10.6 ML/DL
O2 SATURATION: 93.8 % (ref 92–98.5)
O2HB: 93.4 % (ref 94–97)
OPERATOR ID: 797
PATIENT TEMP: 37 C
PCO2: 34.1 MMHG (ref 35–45)
PDW BLD-RTO: 16.7 FL (ref 11.5–15)
PDW BLD-RTO: 16.9 FL (ref 11.5–15)
PEEP/CPAP: 5 CMH2O
PFO2: 2.66 MMHG/%
PH BLOOD GAS: 7.37 (ref 7.35–7.45)
PHOSPHORUS: 7.8 MG/DL (ref 2.5–4.5)
PHOSPHORUS: 8 MG/DL (ref 2.5–4.5)
PHOSPHORUS: 8.5 MG/DL (ref 2.5–4.5)
PHOSPHORUS: 9.4 MG/DL (ref 2.5–4.5)
PLATELET # BLD: 260 E9/L (ref 130–450)
PLATELET # BLD: 268 E9/L (ref 130–450)
PMV BLD AUTO: 10.3 FL (ref 7–12)
PMV BLD AUTO: 10.8 FL (ref 7–12)
PO2: 79.7 MMHG (ref 75–100)
POTASSIUM SERPL-SCNC: 4 MMOL/L (ref 3.5–5)
POTASSIUM SERPL-SCNC: 4.2 MMOL/L (ref 3.5–5)
POTASSIUM SERPL-SCNC: 4.5 MMOL/L (ref 3.5–5)
POTASSIUM SERPL-SCNC: 4.5 MMOL/L (ref 3.5–5)
PROTHROMBIN TIME: 15.8 SEC (ref 9.3–12.4)
PS: 5 CMH20
RBC # BLD: 2.32 E12/L (ref 3.5–5.5)
RBC # BLD: 2.6 E12/L (ref 3.5–5.5)
RI(T): 1.09
SODIUM BLD-SCNC: 130 MMOL/L (ref 132–146)
SODIUM BLD-SCNC: 134 MMOL/L (ref 132–146)
SODIUM BLD-SCNC: 134 MMOL/L (ref 132–146)
SODIUM BLD-SCNC: 136 MMOL/L (ref 132–146)
SOURCE, BLOOD GAS: ABNORMAL
THB: 8 G/DL (ref 11.5–16.5)
TIME ANALYZED: 1137
TOTAL PROTEIN: 4.5 G/DL (ref 6.4–8.3)
VANCOMYCIN RANDOM: 27.1 MCG/ML (ref 5–40)
WBC # BLD: 12.6 E9/L (ref 4.5–11.5)
WBC # BLD: 14.2 E9/L (ref 4.5–11.5)

## 2021-02-16 PROCEDURE — 94664 DEMO&/EVAL PT USE INHALER: CPT

## 2021-02-16 PROCEDURE — 36592 COLLECT BLOOD FROM PICC: CPT

## 2021-02-16 PROCEDURE — C9113 INJ PANTOPRAZOLE SODIUM, VIA: HCPCS | Performed by: INTERNAL MEDICINE

## 2021-02-16 PROCEDURE — 2580000003 HC RX 258: Performed by: INTERNAL MEDICINE

## 2021-02-16 PROCEDURE — 82962 GLUCOSE BLOOD TEST: CPT

## 2021-02-16 PROCEDURE — 6370000000 HC RX 637 (ALT 250 FOR IP): Performed by: INTERNAL MEDICINE

## 2021-02-16 PROCEDURE — 2000000000 HC ICU R&B

## 2021-02-16 PROCEDURE — 99232 SBSQ HOSP IP/OBS MODERATE 35: CPT | Performed by: INTERNAL MEDICINE

## 2021-02-16 PROCEDURE — 82805 BLOOD GASES W/O2 SATURATION: CPT

## 2021-02-16 PROCEDURE — 85025 COMPLETE CBC W/AUTO DIFF WBC: CPT

## 2021-02-16 PROCEDURE — 82010 KETONE BODYS QUAN: CPT

## 2021-02-16 PROCEDURE — 80202 ASSAY OF VANCOMYCIN: CPT

## 2021-02-16 PROCEDURE — 94640 AIRWAY INHALATION TREATMENT: CPT

## 2021-02-16 PROCEDURE — 84100 ASSAY OF PHOSPHORUS: CPT

## 2021-02-16 PROCEDURE — 85027 COMPLETE CBC AUTOMATED: CPT

## 2021-02-16 PROCEDURE — 6360000002 HC RX W HCPCS: Performed by: INTERNAL MEDICINE

## 2021-02-16 PROCEDURE — 85610 PROTHROMBIN TIME: CPT

## 2021-02-16 PROCEDURE — 80076 HEPATIC FUNCTION PANEL: CPT

## 2021-02-16 PROCEDURE — 36415 COLL VENOUS BLD VENIPUNCTURE: CPT

## 2021-02-16 PROCEDURE — 83735 ASSAY OF MAGNESIUM: CPT

## 2021-02-16 PROCEDURE — 80048 BASIC METABOLIC PNL TOTAL CA: CPT

## 2021-02-16 RX ORDER — MAGNESIUM SULFATE 1 G/100ML
1000 INJECTION INTRAVENOUS ONCE
Status: COMPLETED | OUTPATIENT
Start: 2021-02-16 | End: 2021-02-16

## 2021-02-16 RX ORDER — DEXTROSE AND SODIUM CHLORIDE 5; .45 G/100ML; G/100ML
INJECTION, SOLUTION INTRAVENOUS CONTINUOUS
Status: DISCONTINUED | OUTPATIENT
Start: 2021-02-16 | End: 2021-02-16

## 2021-02-16 RX ORDER — INSULIN GLARGINE 100 [IU]/ML
8 INJECTION, SOLUTION SUBCUTANEOUS 2 TIMES DAILY
Status: DISCONTINUED | OUTPATIENT
Start: 2021-02-16 | End: 2021-02-17

## 2021-02-16 RX ORDER — POTASSIUM CHLORIDE 29.8 MG/ML
20 INJECTION INTRAVENOUS ONCE
Status: COMPLETED | OUTPATIENT
Start: 2021-02-16 | End: 2021-02-16

## 2021-02-16 RX ORDER — PANTOPRAZOLE SODIUM 40 MG/10ML
40 INJECTION, POWDER, LYOPHILIZED, FOR SOLUTION INTRAVENOUS DAILY
Status: DISCONTINUED | OUTPATIENT
Start: 2021-02-16 | End: 2021-02-20

## 2021-02-16 RX ORDER — SODIUM CHLORIDE 9 MG/ML
10 INJECTION INTRAVENOUS DAILY
Status: DISCONTINUED | OUTPATIENT
Start: 2021-02-16 | End: 2021-02-27 | Stop reason: HOSPADM

## 2021-02-16 RX ORDER — IPRATROPIUM BROMIDE AND ALBUTEROL SULFATE 2.5; .5 MG/3ML; MG/3ML
1 SOLUTION RESPIRATORY (INHALATION) EVERY 6 HOURS
Status: DISCONTINUED | OUTPATIENT
Start: 2021-02-16 | End: 2021-02-27 | Stop reason: HOSPADM

## 2021-02-16 RX ORDER — POTASSIUM CHLORIDE 29.8 MG/ML
20 INJECTION INTRAVENOUS PRN
Status: DISCONTINUED | OUTPATIENT
Start: 2021-02-16 | End: 2021-02-24

## 2021-02-16 RX ADMIN — ACETAMINOPHEN 650 MG: 325 TABLET, FILM COATED ORAL at 13:52

## 2021-02-16 RX ADMIN — HEPARIN SODIUM 5000 UNITS: 5000 INJECTION INTRAVENOUS; SUBCUTANEOUS at 14:37

## 2021-02-16 RX ADMIN — INSULIN GLARGINE 8 UNITS: 100 INJECTION, SOLUTION SUBCUTANEOUS at 20:23

## 2021-02-16 RX ADMIN — Medication 10 ML: at 10:36

## 2021-02-16 RX ADMIN — HEPARIN SODIUM 5000 UNITS: 5000 INJECTION INTRAVENOUS; SUBCUTANEOUS at 20:23

## 2021-02-16 RX ADMIN — MAGNESIUM SULFATE HEPTAHYDRATE 1000 MG: 1 INJECTION, SOLUTION INTRAVENOUS at 10:38

## 2021-02-16 RX ADMIN — PANTOPRAZOLE SODIUM 40 MG: 40 INJECTION, POWDER, FOR SOLUTION INTRAVENOUS at 10:38

## 2021-02-16 RX ADMIN — ACETAMINOPHEN 650 MG: 325 TABLET, FILM COATED ORAL at 23:51

## 2021-02-16 RX ADMIN — Medication 10 ML: at 20:31

## 2021-02-16 RX ADMIN — INSULIN GLARGINE 8 UNITS: 100 INJECTION, SOLUTION SUBCUTANEOUS at 13:08

## 2021-02-16 RX ADMIN — INSULIN LISPRO 4 UNITS: 100 INJECTION, SOLUTION INTRAVENOUS; SUBCUTANEOUS at 18:16

## 2021-02-16 RX ADMIN — WATER 1000 MG: 1 INJECTION INTRAMUSCULAR; INTRAVENOUS; SUBCUTANEOUS at 10:38

## 2021-02-16 RX ADMIN — DEXTROSE AND SODIUM CHLORIDE: 5; 450 INJECTION, SOLUTION INTRAVENOUS at 09:43

## 2021-02-16 RX ADMIN — IPRATROPIUM BROMIDE AND ALBUTEROL SULFATE 1 AMPULE: .5; 3 SOLUTION RESPIRATORY (INHALATION) at 17:07

## 2021-02-16 RX ADMIN — HEPARIN SODIUM 5000 UNITS: 5000 INJECTION INTRAVENOUS; SUBCUTANEOUS at 06:35

## 2021-02-16 RX ADMIN — DEXTROSE AND SODIUM CHLORIDE: 5; 450 INJECTION, SOLUTION INTRAVENOUS at 01:46

## 2021-02-16 RX ADMIN — SODIUM CHLORIDE, PRESERVATIVE FREE 10 ML: 5 INJECTION INTRAVENOUS at 10:36

## 2021-02-16 RX ADMIN — POTASSIUM CHLORIDE 20 MEQ: 29.8 INJECTION, SOLUTION INTRAVENOUS at 10:39

## 2021-02-16 RX ADMIN — IPRATROPIUM BROMIDE AND ALBUTEROL SULFATE 1 AMPULE: .5; 3 SOLUTION RESPIRATORY (INHALATION) at 12:41

## 2021-02-16 RX ADMIN — INSULIN LISPRO 2 UNITS: 100 INJECTION, SOLUTION INTRAVENOUS; SUBCUTANEOUS at 20:24

## 2021-02-16 ASSESSMENT — PULMONARY FUNCTION TESTS
PIF_VALUE: 17
PIF_VALUE: 20
PIF_VALUE: 20
PIF_VALUE: 17
PIF_VALUE: 18
PIF_VALUE: 27
PIF_VALUE: 13
PIF_VALUE: 17
PIF_VALUE: 18

## 2021-02-16 ASSESSMENT — PAIN DESCRIPTION - PROGRESSION: CLINICAL_PROGRESSION: GRADUALLY WORSENING

## 2021-02-16 ASSESSMENT — PAIN SCALES - GENERAL
PAINLEVEL_OUTOF10: 8
PAINLEVEL_OUTOF10: 6

## 2021-02-16 ASSESSMENT — PAIN DESCRIPTION - ONSET
ONSET: PROGRESSIVE
ONSET: ON-GOING

## 2021-02-16 ASSESSMENT — PAIN DESCRIPTION - FREQUENCY
FREQUENCY: INTERMITTENT
FREQUENCY: INTERMITTENT

## 2021-02-16 ASSESSMENT — PAIN DESCRIPTION - LOCATION
LOCATION: BACK
LOCATION: BACK

## 2021-02-16 ASSESSMENT — PAIN DESCRIPTION - PAIN TYPE: TYPE: ACUTE PAIN

## 2021-02-16 ASSESSMENT — PAIN DESCRIPTION - DESCRIPTORS: DESCRIPTORS: ACHING;DISCOMFORT;DULL

## 2021-02-16 NOTE — PROGRESS NOTES
Pharmacy Consultation Note  (Antibiotic Dosing and Monitoring)    Initial consult date: 2/15/2021  Consulting physician: Dr. Chino Lee  Drug(s): Vancomycin  Indication: Sepsis    Ht Readings from Last 1 Encounters:   02/15/21 5' 4\" (1.626 m)     Wt Readings from Last 1 Encounters:   02/15/21 173 lb 11.6 oz (78.8 kg)     Age/  Gender IBW DW  Allergy Information   31 y.o.  female 54.7 kg 64.3 kg  Cefepime and Toradol [ketorolac tromethamine]         Date  Tmax WBC Dialysis Drug/Dose Time   Given Level(s)   (Time) Comments   2/15  (#1) -- 8.1 PD Vancomycin 1500 mg IV x 1 1745     2/16  (#2) 99.4 14.2 PD No vancomycin -- Random level @ 0845 = 27.1 mcg/mL      (#3)            (#4)            (#5)            (#6)            (#7)            Estimated Creatinine Clearance: 11 mL/min (A) (based on SCr of 7.9 mg/dL (H)). UOP over the past 24 hours:       Intake/Output Summary (Last 24 hours) at 2/16/2021 1242  Last data filed at 2/16/2021 1053  Gross per 24 hour   Intake 3163.05 ml   Output 985 ml   Net 2178.05 ml       Anti-infective Regimen:  Anti-infective Dose Date Initiated Date Stopped   Ceftriaxone 1000 mg IV q24hr 2/15      Cultures:  available culture and sensitivity results were reviewed in EPIC  Cultures sent and are pending. Culture Date Result             Assessment:  · Consulted by Dr. Chino Lee to dose/monitor vancomycin  · Goal trough level:  15-20 mcg/mL, AUC/DEENA:   400-600  · Pt is a 31 yoF who presented from home in PEA arrest. Patient has hx of ESRD on peritoneal dialysis, diabetes, in DKA this admission, and multiple hospitalizations. Empiric antibiotics initiated for sepsis. · Hx ESRD, HD on peritoneal dialysis.   · Random level this AM = 27.1 mcg/mL    Plan:  · No dose today  · Follow dialysis schedule for further dosing and levels  · Pharmacist will follow and monitor/adjust dosing as necessary      Thank you for the consult,    Omid Piedra, PharmD, BCPS 2/16/2021 12:42 PM   Ext: [phone]

## 2021-02-16 NOTE — CARE COORDINATION
2/16/21 pt does currently see Dr. Brian Armijo. She is to make an appointment when she needs to see him. Encouraged patient to make a follow up appointment after she discharges. CM will follow.  Electronically signed by TOR Rousseau on 2/16/2021 at 1:09 PM

## 2021-02-16 NOTE — FLOWSHEET NOTE
This note also relates to the following rows which could not be included:  Pulse - Cannot attach notes to unvalidated device data  Resp - Cannot attach notes to unvalidated device data  SpO2 - Cannot attach notes to unvalidated device data    Pt removed from cycler at this time utilizing sterile technique. No adverse effects. Dressing to PD cath site intact and dry. U/F total 768 ml. Tolerated well. No adverse effects. Effluent fluid pale clear yellow with no notable sediment or mucous present.

## 2021-02-16 NOTE — PROGRESS NOTES
[address] [place], [place] [address]ist   Progress Note    Admitting Date and Time: 2/15/2021  8:07 AM  Admit Dx: Cardiac arrest [facility]) [I46.9]    Subjective/interval history:    2/16: Patient was admitted yesterday afternoon with PEA cardiac arrest, sepsis due to urinary tract infection, hyperkalemia in setting of end-stage renal disease on peritoneal dialysis. This morning she is intubated, but awake, alert, appears anxious.     Per RN: Patient did well overnight, planning for SBT this morning per pulmonary critical care     ROS: unable to obtain ROS as patient is intubated and anxious appearing     magnesium sulfate  1,000 mg Intravenous Once    potassium chloride  20 mEq Intravenous Once    pantoprazole  40 mg Intravenous Daily    And    sodium chloride (PF)  10 mL Intravenous Daily    ipratropium-albuterol  1 ampule Inhalation Q6H    levothyroxine  75 mcg Per NG tube QAM AC    sodium chloride flush  10 mL Intravenous 2 times per day    heparin (porcine)  5,000 Units Subcutaneous 3 times per day    cefTRIAXone (ROCEPHIN) IV  1,000 mg Intravenous Q24H    vancomycin (VANCOCIN) intermittent dosing (placeholder)   Other RX Placeholder         potassium chloride, 20 mEq, PRN      glucose, 15 g, PRN      dextrose, 12.5 g, PRN      glucagon (rDNA), 1 mg, PRN      dextrose, 100 mL/hr, PRN      sodium chloride flush, 10 mL, PRN      polyethylene glycol, 17 g, Daily PRN      acetaminophen, 650 mg, Q6H PRN    Or      acetaminophen, 650 mg, Q6H PRN         Objective:    BP (!) 158/98   Pulse 94   Temp 99 °F (37.2 °C) (Axillary)   Resp 8   Ht 5' 4\" (1.626 m)   Wt 173 lb 11.6 oz (78.8 kg)   SpO2 94%   BMI 29.82 kg/m²   General Appearance: Intubated, appears anxious  Skin: warm and dry  Head: normocephalic and atraumatic  Eyes: pupils equal, round, and reactive to light, extraocular eye movements intact, conjunctivae normal  Neck: neck supple and non tender without mass Pulmonary/Chest: Intubated, mechanically ventilated but clear to auscultation bilaterally- no wheezes, rales or rhonchi, normal air movement, no respiratory distress  Cardiovascular: normal rate, normal S1 and S2 and no carotid bruits  Abdomen: soft, non-distended, normal bowel sounds, no masses or organomegaly  Extremities: no cyanosis, no clubbing and no edema  Neurologic: Intubated, but moving all extremities      Recent Labs     02/16/21  0035 02/16/21  0446 02/16/21  0845    136 134   K 4.5 4.0 4.2   CL 98 99 98   CO2 21* 21* 21*   BUN 61* 55* 51*   CREATININE 8.9* 8.0* 7.9*   GLUCOSE 192* 68* 133*   CALCIUM 7.7* 7.4* 7.4*       Recent Labs     02/15/21  0839 02/15/21  1015 02/16/21  0446   ALKPHOS 239* 236* 169*   PROT 5.5* 4.4* 4.5*   LABALBU 2.4* 2.0* 2.1*   BILITOT 0.3 <0.2 <0.2   AST 1,900* 1,767* 808*   ALT 1,037* 860* 580*       Recent Labs     02/15/21  0839 02/16/21  0446   WBC 8.1 12.6*   RBC 2.78* 2.32*   HGB 8.2* 6.9*   HCT 28.9* 20.4*   .0* 87.9   MCH 29.5 29.7   MCHC 28.4* 33.8   RDW 17.1* 16.7*    260   MPV 10.4 10.8       CBC:   Lab Results   Component Value Date    WBC 12.6 02/16/2021    RBC 2.32 02/16/2021    HGB 6.9 02/16/2021    HCT 20.4 02/16/2021    MCV 87.9 02/16/2021    MCH 29.7 02/16/2021    MCHC 33.8 02/16/2021    RDW 16.7 02/16/2021     02/16/2021    MPV 10.8 02/16/2021     CMP:    Lab Results   Component Value Date     02/16/2021    K 4.2 02/16/2021    K 5.4 02/15/2021    CL 98 02/16/2021    CO2 21 02/16/2021    BUN 51 02/16/2021    CREATININE 7.9 02/16/2021    GFRAA 7 02/16/2021    LABGLOM 7 02/16/2021    GLUCOSE 133 02/16/2021    GLUCOSE 130 05/18/2012    PROT 4.5 02/16/2021    LABALBU 2.1 02/16/2021    LABALBU 4.1 05/18/2012    CALCIUM 7.4 02/16/2021    BILITOT <0.2 02/16/2021    ALKPHOS 169 02/16/2021     02/16/2021     02/16/2021        Radiology:   CT HEAD WO CONTRAST   Final Result   No acute intracranial abnormality. XR ABDOMEN FOR NG/OG/NE TUBE PLACEMENT   Final Result   1. ET tube in satisfactory position. No evidence of acute intrathoracic   disease. 2. NG tube tip in the distal stomach. 3. Nonspecific small bowel distension that could be related to ileus or   small-bowel obstruction. 4. Suspected ascites. XR CHEST PORTABLE   Final Result   1. ET tube in satisfactory position. No evidence of acute intrathoracic   disease. 2. NG tube tip in the distal stomach. 3. Nonspecific small bowel distension that could be related to ileus or   small-bowel obstruction. 4. Suspected ascites. Assessment/Plan:  Principal Problem:    Cardiac arrest ([facility]  Active Problems:    Diabetic ketoacidosis with coma associated with type 1 diabetes mellitus ([facility]    UTI (urinary tract infection)    Diabetes mellitus type 1, uncontrolled ([facility]    Sepsis ([facility]    Hypertension    ESRD on peritoneal dialysis ([facility]    Shock liver  Resolved Problems:    * No resolved hospital problems. *      1. PEA cardiac arrest with successful resuscitation  -Possibly due to arrhythmia from electrolyte abnormalities. She was only slightly hyperkalemic on blood chemistries, however this was after she was given treatment for probable hyperkalemia based on EKG findings  -BMP, magnesium, phosphorus every 4 hours  -Treat potential underlying causes including electrolyte abnormalities, DKA, sepsis     2. Diabetic ketoacidosis  -Insulin drip protocol  -Blood glucose every hour  -BMP, magnesium, phosphorus every 4 hours  -Anion gap 15 this morning, continuing insulin drip and recheck BMP per protocol    3. Sepsis-likely due to urinary tract infection  -Given critical illness and cardiac arrest, will treat with ceftriaxone as well as vancomycin to cover for resistant organisms that she is frequently in the hospital     4.   Hyperkalemia  -Treated with insulin, calcium gluconate, bicarbonate in the ED  -BMP every 4 hours for now -Continue peritoneal dialysis per nephrology     5. Uncontrolled type 1 diabetes mellitus  -After patient is out of DKA, resume subcutaneous insulin pump     6. End-stage renal disease on peritoneal dialysis  -Nephrology following for dialysis management     7. Shock liver  -Transaminases improving, bilirubin normal, INR only slightly elevated at 1.3    8. Essential HTN  -Hold schedule antihypertensives for now given cardiac arrest, monitor blood pressure closely and use as needed IV medications     Code Status: Full code  DVT prophylaxis: Subcutaneous heparin    NOTE: This report was transcribed using voice recognition software. Every effort was made to ensure accuracy; however, inadvertent computerized transcription errors may be present.      Electronically signed by Mundo Kaufman DO on 2/16/2021 at 9:57 AM

## 2021-02-16 NOTE — FLOWSHEET NOTE
[employer] initiated using aseptic technique; New dressing applied.  No complications draining or filling noted

## 2021-02-16 NOTE — PROGRESS NOTES
Subjective:     2-16:  Vented, sp PD last night, ABG better, responds off sedation. LFT better, HGB 6.9, no obvious active bleed. SBT today    Vented sedated, unable to provide HPI/systemic review. Data gathered from reviewing the medical record and discussion with ancillary staff. PMH,Social Hx and Family Hx : Reviewed; no changes from initial note      Review of Systems    Unable to assess    Objective:   Data Review:  Vital Signs: BP (!) 166/111   Pulse 94   Temp 99 °F (37.2 °C) (Axillary)   Resp 8   Ht 5' 4\" (1.626 m)   Wt 173 lb 11.6 oz (78.8 kg)   SpO2 94%   BMI 29.82 kg/m²     24 Hour I&O Review:     Intake/Output Summary (Last 24 hours) at 2/16/2021 1254  Last data filed at 2/16/2021 1053  Gross per 24 hour   Intake 3163.05 ml   Output 985 ml   Net 2178.05 ml       Physical Exam   VS: reviewed, trend noted  Level of Alertness:   Vented, sedated. CONSTITUTIONAL:  Awakes to touch  Throat: clear, no ulcerations or exudate  Ears:clear, no discharge  Neck:supple no LAP, or masses  LUNGS:  +crackles occ wheezes  CARDIOVASCULAR: RRR no M/R/G  ABDOMEN:  normal bowel sounds, distended and non-tender to palpation  EXT: No edema, no calf tenderness. Pulses are present bilaterally.   NEUROLOGIC:  Non focal GROSSLY   SKIN:  normal skin color  Psych: unable to assess      Continuous Infusions:   dextrose 5 % and 0.45 % NaCl 150 mL/hr at 02/16/21 0943    propofol 10 mcg/kg/min (02/15/21 2341)    dextrose      insulin 0.72 Units/hr (02/16/21 1102)    fentaNYL 5 mcg/ml in 0.9%  ml infusion 25 mcg/hr (02/15/21 2015)         Current Medications: Current Facility-Administered Medications: dextrose 5 % and 0.45 % sodium chloride infusion, , Intravenous, Continuous  potassium chloride 20 mEq/50 mL IVPB (Central Line), 20 mEq, Intravenous, PRN  pantoprazole (PROTONIX) injection 40 mg, 40 mg, Intravenous, Daily **AND** sodium chloride (PF) 0.9 % injection 10 mL, 10 mL, Intravenous, Daily ipratropium-albuterol (DUONEB) nebulizer solution 1 ampule, 1 ampule, Inhalation, Q6H  insulin glargine (LANTUS) injection vial 8 Units, 8 Units, Subcutaneous, BID  insulin lispro (HUMALOG) injection vial 0-12 Units, 0-12 Units, Subcutaneous, Q4H  propofol injection, 5-50 mcg/kg/min, Intravenous, Titrated  glucose (GLUTOSE) 40 % oral gel 15 g, 15 g, Oral, PRN  dextrose 50 % IV solution, 12.5 g, Intravenous, PRN  glucagon (rDNA) injection 1 mg, 1 mg, Intramuscular, PRN  dextrose 5 % solution, 100 mL/hr, Intravenous, PRN  insulin regular (HUMULIN R;NOVOLIN R) 100 Units in sodium chloride 0.9 % 100 mL infusion, 0.1 Units/kg/hr, Intravenous, Continuous  fentaNYL 5 mcg/ml in 0.9%  ml infusion, 12.5-200 mcg/hr, Intravenous, Continuous  levothyroxine (SYNTHROID) tablet 75 mcg, 75 mcg, Per NG tube, QAM AC  sodium chloride flush 0.9 % injection 10 mL, 10 mL, Intravenous, 2 times per day  sodium chloride flush 0.9 % injection 10 mL, 10 mL, Intravenous, PRN  polyethylene glycol (GLYCOLAX) packet 17 g, 17 g, Oral, Daily PRN  acetaminophen (TYLENOL) tablet 650 mg, 650 mg, Oral, Q6H PRN **OR** acetaminophen (TYLENOL) suppository 650 mg, 650 mg, Rectal, Q6H PRN  heparin (porcine) injection 5,000 Units, 5,000 Units, Subcutaneous, 3 times per day  cefTRIAXone (ROCEPHIN) 1,000 mg in sterile water 10 mL IV syringe, 1,000 mg, Intravenous, Q24H  vancomycin (VANCOCIN) intermittent dosing (placeholder), , Other, RX Placeholder    Ventilator Settings:Vent Information  $Ventilation: $Subsequent Day  Vent Type: 980  Vent Mode: AC/VC  Vt Ordered: 380 mL  Rate Set: 18 bmp  Peak Flow: 50 L/min  Pressure Support: 0 cmH20  FiO2 : 30 %  SpO2: 94 %  SpO2/FiO2 ratio: 313.33  Sensitivity: 3  PEEP/CPAP: 5  I Time/ I Time %: 0 s   CBC:  Recent Labs     02/15/21  0839 02/16/21  0446 02/16/21  1100   WBC 8.1 12.6* 14.2*   RBC 2.78* 2.32* 2.60*   HGB 8.2* 6.9* 7.6*   HCT 28.9* 20.4* 22.8*    260 268   .0* 87.9 87.7 MCH 29.5 29.7 29.2   MCHC 28.4* 33.8 33.3   RDW 17.1* 16.7* 16.9*      BMP:  Recent Labs     02/16/21  0035 02/16/21  0446 02/16/21  0845    136 134   K 4.5 4.0 4.2   CL 98 99 98   CO2 21* 21* 21*   BUN 61* 55* 51*   CREATININE 8.9* 8.0* 7.9*   CALCIUM 7.7* 7.4* 7.4*   GLUCOSE 192* 68* 133*      ABG:  Lab Results   Component Value Date    PH 7.365 02/16/2021    PH 7.290 09/08/2012    PCO2 34.1 02/16/2021    PO2 79.7 02/16/2021    HCO3 19.0 02/16/2021    O2SAT 93.8 02/16/2021       Cultures:  No results for input(s): BC in the last 72 hours. No results for input(s): Candace Zuniga in the last 72 hours. No results for input(s): CULTRESP in the last 72 hours. Radiology Review:  Pertinent images / reports were reviewed as a part of this visit. CT HEAD WO CONTRAST   Final Result   No acute intracranial abnormality. XR ABDOMEN FOR NG/OG/NE TUBE PLACEMENT   Final Result   1. ET tube in satisfactory position. No evidence of acute intrathoracic   disease. 2. NG tube tip in the distal stomach. 3. Nonspecific small bowel distension that could be related to ileus or   small-bowel obstruction. 4. Suspected ascites. XR CHEST PORTABLE   Final Result   1. ET tube in satisfactory position. No evidence of acute intrathoracic   disease. 2. NG tube tip in the distal stomach. 3. Nonspecific small bowel distension that could be related to ileus or   small-bowel obstruction. 4. Suspected ascites.           Other Diagnostic Testing:      Assessment:   DM1 with DKA, COMA     PEA cardiac arrest     Acute resp failure with hypoxia, also intubated in the setting of unresponsiveness     ESRD on PD     Acidosis      UTI unspecified site without hematuria    Hyperkalemia     Anemia of chronic kidney disease, drop from prior, no active bleed noted      Plan:     Vent support, FU ABG, adjust settings as needed, SBT today     Insulin gtt (transition), IVF, monitor BS, RFP, replace electrolytes    FU nephrology recs, PD     Abx, fu cx    Monitor CBC, PRBC to keep >7 as needed     Discussed with RN, RT re management     ICU Staff Physician note of personal involvement in Care  As the attending physician, I certify that I personally reviewed the patient's history and personally examined the patient to confirm the physical findings described above,  And that I reviewed the relevant imaging studies and available reports.       This patient has a high probability of sudden, clinically significant deterioration, which requires the highest level of physician preparedness to intervene urgently.  I managed/supervised life or organ supporting interventions that required frequent physician assessment.   I devoted my full attention to the direct care of this patient for the amount of time indicated below.  Time I spent with the family or surrogate(s) is included only if the patient was incapable of providing the necessary information or participating in medical decisions - Time devoted to teaching and to any procedures I billed separately is not included.     CRITICAL CARE TIME:  43 minutes        Electronically signed by Katelynn Almaguer MD on 2/16/2021 at 12:54 PM

## 2021-02-16 NOTE — CARE COORDINATION
2/16/2021 Negative covid 2/15/21. Cm transition of care: pt presented to s/p cardiac arrest. Pt on vent- weaning likely today/sedation/insulin gtt. Pt is from home -one story apartment with her two children. She does her own Peritoneal dialysis fills at night and empties in morning ([facility]). [facility] supplies her dialysis needs. Pt has been to [facility] infusion center. CM will meet with patient once off the vent. pts mother has previously provided her transportation to home.  Electronically signed by Carmen Luo RN-BC on 2/16/2021 at 10:29 AM

## 2021-02-16 NOTE — PROGRESS NOTES
Patient extubated to NC 3 lpm suctioned prior to for moderate amount neg for stridor neg for wheeze pulse ox 97% le well RN at bedside

## 2021-02-16 NOTE — CONSULTS
Consult ordered by Dr. Carla Herman, Reason for consult is resp failure, post PEA arrest, DKA    CHIEF COMPLAINT / HPI:    31 y.o. female with a history of end-stage renal disease on peritoneal dialysis, uncontrolled type 1 diabetes mellitus, uncontrolled hypertension, hypothyroidism presents unresponsive with PEA cardiac arrest. Patient was brought in by squad after being found unresponsive at home. On arrival in the ED, pulse was not detected with the patient had 2 rounds of CPR before achieving ROSC. EKG at that time showed peaked T waves in multiple leads concerning for hyperkalemia. During the code she received 1 dose of epinephrine, 1 amp of bicarbonate, as well as calcium gluconate and insulin. Blood glucose on fingerstick was read as high. BMP that was drawn after the code showed potassium of 5.4, anion gap of 27, severe transaminitis. and urinalysis suggestive of UTI. cxr NAD, CT head NAD. SHE is responding off sedation , on vent. unable to provide HPI/systemic review. Data gathered from reviewing the medical record and discussion with ancillary staff. Currently on insulin gtt, IVF, abx. Past Medical History:    Reviewed; no changes.    Past Medical History:   Diagnosis Date    Acute congestive heart failure ([address]     BC (acute kidney injury) ([address] 10/1/2019    Cephalgia 10/9/2019    Chronic kidney disease     Depression     Diabetes mellitus ([address]     Diabetic ketoacidosis ([address] 8/27/2011    Hemodialysis patient ([address]     History of blood transfusion 11/2019    Hyperlipidemia 10/8/2020    Hypothyroidism 10/8/2020    Iron deficiency anemia 10/1/2019    MDRO (multiple drug resistant organisms) resistance     MRSA (methicillin resistant Staphylococcus aureus)     back wound abcess    Non compliance w medication regimen 3/30/2016    Other disorders of kidney and ureter     Pregnancy 3/30/2016    16 weeks    Seizure ([address] 11/20/2020  Severe pre-eclampsia in third trimester 8/22/2016    Shock liver 2/15/2021       Social History:    Reviewed; no changes. Social History     Socioeconomic History    Marital status: Single     Spouse name: Not on file    Number of children: 2    Years of education: Not on file    Highest education level: Not on file   Occupational History    Not on file   Social Needs    Financial resource strain: Very hard    Food insecurity     Worry: Never true     Inability: Never true    Transportation needs     Medical: No     Non-medical: No   Tobacco Use    Smoking status: Current Every Day Smoker     Packs/day: 0.50     Years: 6.00     Pack years: 3.00     Types: Cigarettes    Smokeless tobacco: Current User   Substance and Sexual Activity    Alcohol use: No    Drug use: No     Comment: documented prior history of opioid abuse    Sexual activity: Yes     Partners: Male   Lifestyle    Physical activity     Days per week: Not on file     Minutes per session: Not on file    Stress: Not on file   Relationships    Social connections     Talks on phone: Not on file     Gets together: Not on file     Attends Tenri[employer] service: Not on file     Active member of club or organization: Not on file     Attends meetings of clubs or organizations: Not on file     Relationship status: Not on file    Intimate partner violence     Fear of current or ex partner: Not on file     Emotionally abused: Not on file     Physically abused: Not on file     Forced sexual activity: Not on file   Other Topics Concern    Not on file   Social History Narrative    Not on file       Allergies: Allergies   Allergen Reactions    Cefepime Other (See Comments)     \" neurological side effect- slurred speech and confusion    Toradol [Ketorolac Tromethamine] Anaphylaxis and Hives       Meds: reviewed   Prior to Admission medications    Medication Sig Start Date End Date Taking?  Authorizing Provider gabapentin (NEURONTIN) 100 MG capsule Take 200 mg by mouth 2 times daily. Yes Historical Provider, MD   Insulin Pump - insulin aspart (patient supplied) Inject into the skin continuous Insulin-to-Carb Ratio (ICR): **  Insulin Sensitivity Factor (ISF): ** mg/dL per unit of insulin  Target Blood Glucose: ** mg/dL  Bolus Frequency: **   Yes Historical Provider, MD   rOPINIRole (REQUIP) 0.25 MG tablet Take 0.25 mg by mouth nightly   Yes Historical Provider, MD   losartan (COZAAR) 50 MG tablet Take 50 mg by mouth daily   Yes Historical Provider, MD   metoprolol tartrate (LOPRESSOR) 25 MG tablet Take 25 mg by mouth 2 times daily   Yes Historical Provider, MD   bumetanide (BUMEX) 2 MG tablet Take 1 tablet by mouth 2 times daily New higher frequency 12/29/20  Yes Kimberley Montelongo MD   Calcium Acetate, Phos Binder, 667 MG CAPS Take 1 capsule by mouth 3 times daily (with meals) 12/29/20  Yes Kimberley Montelongo MD   dilTIAZem (CARDIZEM CD) 240 MG extended release capsule Take 1 capsule by mouth daily 12/15/20  Yes Rita Ochoa MD   mirtazapine (REMERON) 15 MG tablet Take 1 tablet by mouth nightly 12/15/20  Yes Rita Ochoa MD   metoclopramide (REGLAN) 5 MG tablet Take 5 mg by mouth 3 times daily    Yes Historical Provider, MD   levothyroxine (SYNTHROID) 75 MCG tablet Take 1 tablet by mouth every morning (before breakfast) 11/12/20  Yes Christopher Koo MD   metOLazone (ZAROXOLYN) 5 MG tablet Take 5 mg by mouth daily   Yes Historical Provider, MD   atorvastatin (LIPITOR) 40 MG tablet Take 1 tablet by mouth nightly 9/3/20  Yes Rita Ochoa MD   blood glucose test strips (CONTOUR NEXT TEST) strip 1 each by In Vitro route 5 times daily As needed.  12/14/20   Zeke Jean MD   cloNIDine (CATAPRES) 0.1 MG tablet Take 0.1 mg by mouth 2 times daily as needed for High Blood Pressure    Historical Provider, MD       Family hx:   Family History   Problem Relation Age of Onset    Asthma Mother     Hypertension Mother PH 7.219 02/15/2021    PH 7.290 09/08/2012    PCO2 43.8 02/15/2021    PO2 49.2 02/15/2021    HCO3 17.5 02/15/2021    O2SAT 77.2 02/15/2021       Cultures:  No results for input(s): BC in the last 72 hours. No results for input(s): Elane Burdock in the last 72 hours. No results for input(s): CULTRESP in the last 72 hours. Radiology Review:  Pertinent images / reports were reviewed as a part of this visit. reveals the following:  CT HEAD WO CONTRAST   Final Result   No acute intracranial abnormality. XR ABDOMEN FOR NG/OG/NE TUBE PLACEMENT   Final Result   1. ET tube in satisfactory position. No evidence of acute intrathoracic   disease. 2. NG tube tip in the distal stomach. 3. Nonspecific small bowel distension that could be related to ileus or   small-bowel obstruction. 4. Suspected ascites. XR CHEST PORTABLE   Final Result   1. ET tube in satisfactory position. No evidence of acute intrathoracic   disease. 2. NG tube tip in the distal stomach. 3. Nonspecific small bowel distension that could be related to ileus or   small-bowel obstruction. 4. Suspected ascites.           Assessment:     DM1 with DKA, COMA    PEA cardiac arrest    Acute resp failure with hypoxia, also intubated in the setting of unresponsiveness    ESRD on PD    Acidosis     Hyperkalemia    Anemia of chronic kidney disease    Plan:     Vent support, FU ABG, adjust settings as needed    SBT once stable    Insulin gtt, IVF, monitor BS, RFP, replace electrolytes    FU nephrology recs    Monitor CBC    Discussed with RN, RT re management    ICU Staff Physician note of personal involvement in Care  As the attending physician, I certify that I personally reviewed the patient's history and personally examined the patient to confirm the physical findings described above,  And that I reviewed the relevant imaging studies and available reports.  This patient has a high probability of sudden, clinically significant deterioration, which requires the highest level of physician preparedness to intervene urgently.  I managed/supervised life or organ supporting interventions that required frequent physician assessment.   I devoted my full attention to the direct care of this patient for the amount of time indicated below.  Time I spent with the family or surrogate(s) is included only if the patient was incapable of providing the necessary information or participating in medical decisions - Time devoted to teaching and to any procedures I billed separately is not included.     CRITICAL CARE TIME:  58 minutes        Electronically signed by Poonam Villalobos MD on 2/15/2021 at 7:03 PM

## 2021-02-16 NOTE — CONSULTS
Department of Internal Medicine  Nephrology Attending Consult Note      Reason for Consult:  End-Stage Renal Disease  Requesting Physician:  Dr. Katz Heart: Found unresponsive at home. 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  She was admitted to MICU with a diagnosis of DKA and status post cardiac arrest, she was intubated. Patient was extubated earlier today.         Past Medical History:        Diagnosis Date    Acute congestive heart failure ([address]     BC (acute kidney injury) ([address] 10/1/2019    Cephalgia 10/9/2019    Chronic kidney disease     Depression     Diabetes mellitus ([address]     Diabetic ketoacidosis ([address] 2011    Hemodialysis patient ([address]     History of blood transfusion 2019    Hyperlipidemia 10/8/2020    Hypothyroidism 10/8/2020    Iron deficiency anemia 10/1/2019    MDRO (multiple drug resistant organisms) resistance     MRSA (methicillin resistant Staphylococcus aureus)     back wound abcess    Non compliance w medication regimen 3/30/2016    Other disorders of kidney and ureter     Pregnancy 3/30/2016    16 weeks    Seizure ([address] 2020    Severe pre-eclampsia in third trimester 2016    Shock liver 2/15/2021     Past Surgical History:        Procedure Laterality Date    BACK SURGERY      abscess     SECTION      x2    CHOLECYSTECTOMY, LAPAROSCOPIC N/A 2019    CHOLECYSTECTOMY LAPAROSCOPIC performed by Jazmine Jovel MD at [address] [place] [address] N/A 2018 COLONOSCOPY WITH BIOPSY performed by Leonard Mascorro MD at [address] N/A 12/18/2018    COLONOSCOPY WITH BIOPSY performed by Tino Juarez MD at [address]  1/31/2012    EF 57%    ECHO COMPL W DOP COLOR FLOW  6/10/2013         EMBOLECTOMY N/A 11/6/2020    [address], [address] [facility], [place] -- REQS ROOM 3 performed by Tianna Myers MD at [address] [facility] N/A 6/26/2020    LAPAROSCOPIC INSERTION PERITONEAL DIALYSIS CATHETER performed by Amilcar Cho MD at [facility] [address] ESOPHAGOGASTRODUODENOSCOPY TRANSORAL DIAGNOSTIC N/A 5/30/2018    EGD ESOPHAGOGASTRODUODENOSCOPY performed by Leonard Mascorro MD at [address] [facility] TRANSESOPHAGEAL ECHOCARDIOGRAM N/A 10/19/2020    TRANSESOPHAGEAL ECHOCARDIOGRAM WITH BUBBLE STUDY performed by Earnest Lowery MD at [address] [facility] [address] [address]  04/2018    UPPER GASTROINTESTINAL ENDOSCOPY  12/18/2018    EGD BIOPSY performed by Tino Juarez MD at [address] [place] [address] N/A 10/11/2019    EGD ESOPHAGOGASTRODUODENOSCOPY performed by Norma Doty DO at [place] [address]     Current Medications:    Current Facility-Administered Medications: potassium chloride 20 mEq/50 mL IVPB (Central Line), 20 mEq, Intravenous, PRN  pantoprazole (PROTONIX) injection 40 mg, 40 mg, Intravenous, Daily **AND** sodium chloride (PF) 0.9 % injection 10 mL, 10 mL, Intravenous, Daily  ipratropium-albuterol (DUONEB) nebulizer solution 1 ampule, 1 ampule, Inhalation, Q6H  insulin glargine (LANTUS) injection vial 8 Units, 8 Units, Subcutaneous, BID  insulin lispro (HUMALOG) injection vial 0-12 Units, 0-12 Units, Subcutaneous, Q4H  glucose (GLUTOSE) 40 % oral gel 15 g, 15 g, Oral, PRN  dextrose 50 % IV solution, 12.5 g, Intravenous, PRN  glucagon (rDNA) injection 1 mg, 1 mg, Intramuscular, PRN  dextrose 5 % solution, 100 mL/hr, Intravenous, PRN levothyroxine (SYNTHROID) tablet 75 mcg, 75 mcg, Per NG tube, QAM AC  sodium chloride flush 0.9 % injection 10 mL, 10 mL, Intravenous, 2 times per day  sodium chloride flush 0.9 % injection 10 mL, 10 mL, Intravenous, PRN  polyethylene glycol (GLYCOLAX) packet 17 g, 17 g, Oral, Daily PRN  acetaminophen (TYLENOL) tablet 650 mg, 650 mg, Oral, Q6H PRN **OR** acetaminophen (TYLENOL) suppository 650 mg, 650 mg, Rectal, Q6H PRN  heparin (porcine) injection 5,000 Units, 5,000 Units, Subcutaneous, 3 times per day  cefTRIAXone (ROCEPHIN) 1,000 mg in sterile water 10 mL IV syringe, 1,000 mg, Intravenous, Q24H  vancomycin (VANCOCIN) intermittent dosing (placeholder), , Other, RX Placeholder  Allergies:  Cefepime and Toradol [ketorolac tromethamine]    Social History:    TOBACCO:   reports that she has been smoking cigarettes. She has a 3.00 pack-year smoking history. She uses smokeless tobacco.  ETOH:   reports no history of alcohol use.     Family History:       Problem Relation Age of Onset   [place] Asthma Mother     Hypertension Mother     High Blood Pressure Mother     Diabetes Mother     Asthma Brother     High Blood Pressure Father      REVIEW OF SYSTEMS:    CONSTITUTIONAL:  negative for  fevers and chills  EYES:  negative  HEENT:  negative for  hearing loss and epistaxis  RESPIRATORY:  negative  CARDIOVASCULAR:  negative for  chest pain  GASTROINTESTINAL:  negative for diarrhea  GENITOURINARY:  negative  INTEGUMENT/BREAST:  negative  HEMATOLOGIC/LYMPHATIC:  negative  ALLERGIC/IMMUNOLOGIC:  positive for drug reactions  ENDOCRINE:  negative  MUSCULOSKELETAL:  negative  NEUROLOGICAL:  negative for seizures    PHYSICAL EXAM:      Vitals:    VITALS:  BP (!) 161/98   Pulse 106   Temp 98.2 °F (36.8 °C) (Axillary)   Resp (!) 2   Ht 5' 4\" (1.626 m)   Wt 173 lb 11.6 oz (78.8 kg)   SpO2 98%   BMI 29.82 kg/m²   24HR INTAKE/OUTPUT:    Intake/Output Summary (Last 24 hours) at 2/16/2021 4888  Last data filed at 2/16/2021 1440 Gross per 24 hour   Intake 4413.05 ml   Output 1005 ml   Net 3408.05 ml     PD Catheter Exam:  PD catheter exit site clean    Constitutional:  disoriented  HEENT:  Normocephalic, PERRL  Respiratory:  CTA bilaterally  Cardiovascular/Edema:  RRR, S1/S2  Gastrointestinal:  Soft, PD catheter exit site clean   Neurologic:  Nonfocal, AVELAR  Skin:  Warm, dry, no lesions  Other:  No edema      DATA:    CBC:   Lab Results   Component Value Date    WBC 14.2 02/16/2021    RBC 2.60 02/16/2021    HGB 7.6 02/16/2021    HCT 22.8 02/16/2021    MCV 87.7 02/16/2021    MCH 29.2 02/16/2021    MCHC 33.3 02/16/2021    RDW 16.9 02/16/2021     02/16/2021    MPV 10.3 02/16/2021     CMP:    Lab Results   Component Value Date     02/16/2021    K 4.5 02/16/2021    K 5.4 02/15/2021    CL 96 02/16/2021    CO2 18 02/16/2021    BUN 50 02/16/2021    CREATININE 8.5 02/16/2021    GFRAA 7 02/16/2021    LABGLOM 7 02/16/2021    GLUCOSE 196 02/16/2021    GLUCOSE 130 05/18/2012    PROT 4.5 02/16/2021    LABALBU 2.1 02/16/2021    LABALBU 4.1 05/18/2012    CALCIUM 7.1 02/16/2021    BILITOT <0.2 02/16/2021    ALKPHOS 169 02/16/2021     02/16/2021     02/16/2021     Magnesium:    Lab Results   Component Value Date    MG 1.6 02/16/2021     Phosphorus:    Lab Results   Component Value Date    PHOS 7.8 02/16/2021     Radiology Review:      Chest x-ray February 15, 2021   1. ET tube in satisfactory position.  No evidence of acute intrathoracic   disease. 2. NG tube tip in the distal stomach. 3. Nonspecific small bowel distension that could be related to ileus or   small-bowel obstruction. 4. Suspected ascites.          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  She was admitted to MICU with a diagnosis of DKA and status post cardiac arrest, she was intubated. Patient was extubated earlier today. 1. ESRD on peritoneal dialysis, to continue CCPD  2. S/p PEA cardiac arrest  3. Type I DM, DKA, anion gap and bicarbonate levels improved, off insulin drip  4. Respiratory failure, status post intubation, extubated earlier today  5. Probably sepsis, on ceftriaxone and vancomycin  6. Anemia of CKD, to start MARIA TERESA    Plan:    · Change CCPD prescription to 2.5% all exchanges  · Start epoetin alpha 5000 units 3 times a week  · Monitor labs    Thank you very much Dr. Ladonna Cazares for allowing us to participate in the care of . Nathaly Sosa.

## 2021-02-17 ENCOUNTER — APPOINTMENT (OUTPATIENT)
Dept: GENERAL RADIOLOGY | Age: 31
DRG: 710 | End: 2021-02-17
Payer: COMMERCIAL

## 2021-02-17 LAB
ALBUMIN SERPL-MCNC: 2.1 G/DL (ref 3.5–5.2)
ALP BLD-CCNC: 167 U/L (ref 35–104)
ALT SERPL-CCNC: 338 U/L (ref 0–32)
ANION GAP SERPL CALCULATED.3IONS-SCNC: 13 MMOL/L (ref 7–16)
AST SERPL-CCNC: 169 U/L (ref 0–31)
B.E.: -7.4 MMOL/L (ref -3–3)
BILIRUB SERPL-MCNC: 0.2 MG/DL (ref 0–1.2)
BILIRUBIN DIRECT: <0.2 MG/DL (ref 0–0.3)
BILIRUBIN, INDIRECT: ABNORMAL MG/DL (ref 0–1)
BUN BLDV-MCNC: 48 MG/DL (ref 6–20)
CALCIUM SERPL-MCNC: 7.5 MG/DL (ref 8.6–10.2)
CHLORIDE BLD-SCNC: 97 MMOL/L (ref 98–107)
CO2: 23 MMOL/L (ref 22–29)
COHB: 1.1 % (ref 0–1.5)
CREAT SERPL-MCNC: 7.9 MG/DL (ref 0.5–1)
CRITICAL: ABNORMAL
DATE ANALYZED: ABNORMAL
DATE OF COLLECTION: ABNORMAL
GFR AFRICAN AMERICAN: 7
GFR NON-AFRICAN AMERICAN: 7 ML/MIN/1.73
GLUCOSE BLD-MCNC: 104 MG/DL (ref 74–99)
GLUCOSE BLD-MCNC: 179 MG/DL (ref 74–99)
HCO3: 17.5 MMOL/L (ref 22–26)
HCT VFR BLD CALC: 21.3 % (ref 34–48)
HEMOGLOBIN: 7 G/DL (ref 11.5–15.5)
HHB: 10.2 % (ref 0–5)
LAB: ABNORMAL
Lab: ABNORMAL
MAGNESIUM: 1.6 MG/DL (ref 1.6–2.6)
MCH RBC QN AUTO: 29.4 PG (ref 26–35)
MCHC RBC AUTO-ENTMCNC: 32.9 % (ref 32–34.5)
MCV RBC AUTO: 89.5 FL (ref 80–99.9)
METER GLUCOSE: 104 MG/DL (ref 74–99)
METER GLUCOSE: 114 MG/DL (ref 74–99)
METER GLUCOSE: 120 MG/DL (ref 74–99)
METER GLUCOSE: 125 MG/DL (ref 74–99)
METER GLUCOSE: 141 MG/DL (ref 74–99)
METER GLUCOSE: 141 MG/DL (ref 74–99)
METER GLUCOSE: 79 MG/DL (ref 74–99)
METER GLUCOSE: 95 MG/DL (ref 74–99)
METER GLUCOSE: <40 MG/DL (ref 74–99)
METHB: 0.3 % (ref 0–1.5)
MODE: ABNORMAL
O2 CONTENT: 9 ML/DL
O2 SATURATION: 89.7 % (ref 92–98.5)
O2HB: 88.4 % (ref 94–97)
OPERATOR ID: 274
PATIENT TEMP: 37
PCO2: 32.4 MMHG (ref 35–45)
PDW BLD-RTO: 17.6 FL (ref 11.5–15)
PH BLOOD GAS: 7.35 (ref 7.35–7.45)
PHOSPHORUS: 7.7 MG/DL (ref 2.5–4.5)
PLATELET # BLD: 224 E9/L (ref 130–450)
PMV BLD AUTO: 10 FL (ref 7–12)
PO2: 63.6 MMHG (ref 75–100)
POTASSIUM SERPL-SCNC: 4.2 MMOL/L (ref 3.5–5)
RBC # BLD: 2.38 E12/L (ref 3.5–5.5)
SODIUM BLD-SCNC: 133 MMOL/L (ref 132–146)
SOURCE, BLOOD GAS: ABNORMAL
THB: 7.2 G/DL (ref 11.5–16.5)
TIME ANALYZED: 1219
TOTAL PROTEIN: 4.5 G/DL (ref 6.4–8.3)
WBC # BLD: 20 E9/L (ref 4.5–11.5)

## 2021-02-17 PROCEDURE — 6370000000 HC RX 637 (ALT 250 FOR IP): Performed by: INTERNAL MEDICINE

## 2021-02-17 PROCEDURE — 94640 AIRWAY INHALATION TREATMENT: CPT

## 2021-02-17 PROCEDURE — 6360000002 HC RX W HCPCS: Performed by: INTERNAL MEDICINE

## 2021-02-17 PROCEDURE — 82962 GLUCOSE BLOOD TEST: CPT

## 2021-02-17 PROCEDURE — 80076 HEPATIC FUNCTION PANEL: CPT

## 2021-02-17 PROCEDURE — 90945 DIALYSIS ONE EVALUATION: CPT

## 2021-02-17 PROCEDURE — 99232 SBSQ HOSP IP/OBS MODERATE 35: CPT | Performed by: INTERNAL MEDICINE

## 2021-02-17 PROCEDURE — 71045 X-RAY EXAM CHEST 1 VIEW: CPT

## 2021-02-17 PROCEDURE — 2580000003 HC RX 258: Performed by: INTERNAL MEDICINE

## 2021-02-17 PROCEDURE — 83735 ASSAY OF MAGNESIUM: CPT

## 2021-02-17 PROCEDURE — 36592 COLLECT BLOOD FROM PICC: CPT

## 2021-02-17 PROCEDURE — 82805 BLOOD GASES W/O2 SATURATION: CPT

## 2021-02-17 PROCEDURE — 80048 BASIC METABOLIC PNL TOTAL CA: CPT

## 2021-02-17 PROCEDURE — 85027 COMPLETE CBC AUTOMATED: CPT

## 2021-02-17 PROCEDURE — 2000000000 HC ICU R&B

## 2021-02-17 PROCEDURE — C9113 INJ PANTOPRAZOLE SODIUM, VIA: HCPCS | Performed by: INTERNAL MEDICINE

## 2021-02-17 PROCEDURE — 2700000000 HC OXYGEN THERAPY PER DAY

## 2021-02-17 PROCEDURE — 84100 ASSAY OF PHOSPHORUS: CPT

## 2021-02-17 PROCEDURE — 82947 ASSAY GLUCOSE BLOOD QUANT: CPT

## 2021-02-17 RX ORDER — SODIUM CHLORIDE 9 MG/ML
12.5 INJECTION, SOLUTION INTRAVENOUS EVERY 12 HOURS
Status: DISCONTINUED | OUTPATIENT
Start: 2021-02-17 | End: 2021-02-18

## 2021-02-17 RX ORDER — INSULIN GLARGINE 100 [IU]/ML
6 INJECTION, SOLUTION SUBCUTANEOUS 2 TIMES DAILY
Status: DISCONTINUED | OUTPATIENT
Start: 2021-02-17 | End: 2021-02-18

## 2021-02-17 RX ORDER — OXYCODONE HYDROCHLORIDE 5 MG/1
2.5 TABLET ORAL EVERY 4 HOURS PRN
Status: DISCONTINUED | OUTPATIENT
Start: 2021-02-17 | End: 2021-02-18

## 2021-02-17 RX ADMIN — OXYCODONE 2.5 MG: 5 TABLET ORAL at 15:28

## 2021-02-17 RX ADMIN — DEXTROSE MONOHYDRATE 12.5 G: 500 INJECTION PARENTERAL at 21:13

## 2021-02-17 RX ADMIN — IPRATROPIUM BROMIDE AND ALBUTEROL SULFATE 1 AMPULE: .5; 3 SOLUTION RESPIRATORY (INHALATION) at 17:04

## 2021-02-17 RX ADMIN — DEXTROSE MONOHYDRATE 12.5 G: 500 INJECTION PARENTERAL at 21:18

## 2021-02-17 RX ADMIN — IPRATROPIUM BROMIDE AND ALBUTEROL SULFATE 1 AMPULE: .5; 3 SOLUTION RESPIRATORY (INHALATION) at 21:38

## 2021-02-17 RX ADMIN — ACETAMINOPHEN 650 MG: 325 TABLET, FILM COATED ORAL at 04:49

## 2021-02-17 RX ADMIN — PIPERACILLIN AND TAZOBACTAM 3375 MG: 3; .375 INJECTION, POWDER, FOR SOLUTION INTRAVENOUS at 21:23

## 2021-02-17 RX ADMIN — SODIUM CHLORIDE, PRESERVATIVE FREE 10 ML: 5 INJECTION INTRAVENOUS at 12:23

## 2021-02-17 RX ADMIN — HEPARIN SODIUM 5000 UNITS: 5000 INJECTION INTRAVENOUS; SUBCUTANEOUS at 04:48

## 2021-02-17 RX ADMIN — EPOETIN ALFA-EPBX 2000 UNITS: 2000 INJECTION, SOLUTION INTRAVENOUS; SUBCUTANEOUS at 11:21

## 2021-02-17 RX ADMIN — HEPARIN SODIUM 5000 UNITS: 5000 INJECTION INTRAVENOUS; SUBCUTANEOUS at 21:30

## 2021-02-17 RX ADMIN — PANTOPRAZOLE SODIUM 40 MG: 40 INJECTION, POWDER, FOR SOLUTION INTRAVENOUS at 08:23

## 2021-02-17 RX ADMIN — ACETAMINOPHEN 650 MG: 325 TABLET, FILM COATED ORAL at 11:21

## 2021-02-17 RX ADMIN — EPOETIN ALFA-EPBX 3000 UNITS: 3000 INJECTION, SOLUTION INTRAVENOUS; SUBCUTANEOUS at 11:21

## 2021-02-17 RX ADMIN — INSULIN GLARGINE 8 UNITS: 100 INJECTION, SOLUTION SUBCUTANEOUS at 08:24

## 2021-02-17 RX ADMIN — INSULIN LISPRO 2 UNITS: 100 INJECTION, SOLUTION INTRAVENOUS; SUBCUTANEOUS at 08:23

## 2021-02-17 RX ADMIN — LEVOTHYROXINE SODIUM 75 MCG: 75 TABLET ORAL at 05:01

## 2021-02-17 RX ADMIN — INSULIN LISPRO 2 UNITS: 100 INJECTION, SOLUTION INTRAVENOUS; SUBCUTANEOUS at 17:20

## 2021-02-17 RX ADMIN — Medication 10 ML: at 21:19

## 2021-02-17 RX ADMIN — HEPARIN SODIUM 5000 UNITS: 5000 INJECTION INTRAVENOUS; SUBCUTANEOUS at 15:31

## 2021-02-17 RX ADMIN — IPRATROPIUM BROMIDE AND ALBUTEROL SULFATE 1 AMPULE: .5; 3 SOLUTION RESPIRATORY (INHALATION) at 05:23

## 2021-02-17 RX ADMIN — PIPERACILLIN AND TAZOBACTAM 3375 MG: 3; .375 INJECTION, POWDER, FOR SOLUTION INTRAVENOUS at 11:21

## 2021-02-17 RX ADMIN — IPRATROPIUM BROMIDE AND ALBUTEROL SULFATE 1 AMPULE: .5; 3 SOLUTION RESPIRATORY (INHALATION) at 10:24

## 2021-02-17 RX ADMIN — Medication 10 ML: at 12:23

## 2021-02-17 RX ADMIN — SODIUM CHLORIDE 12.5 ML/HR: 9 INJECTION, SOLUTION INTRAVENOUS at 15:30

## 2021-02-17 ASSESSMENT — PAIN DESCRIPTION - PAIN TYPE
TYPE: ACUTE PAIN
TYPE: ACUTE PAIN

## 2021-02-17 ASSESSMENT — PAIN DESCRIPTION - ONSET
ONSET: ON-GOING
ONSET: ON-GOING

## 2021-02-17 ASSESSMENT — PAIN DESCRIPTION - FREQUENCY
FREQUENCY: CONTINUOUS
FREQUENCY: CONTINUOUS

## 2021-02-17 ASSESSMENT — PAIN SCALES - GENERAL
PAINLEVEL_OUTOF10: 10
PAINLEVEL_OUTOF10: 7
PAINLEVEL_OUTOF10: 0
PAINLEVEL_OUTOF10: 7
PAINLEVEL_OUTOF10: 8

## 2021-02-17 ASSESSMENT — PAIN DESCRIPTION - DESCRIPTORS
DESCRIPTORS: ACHING;DISCOMFORT;DULL
DESCRIPTORS: ACHING;DISCOMFORT;DULL

## 2021-02-17 ASSESSMENT — PAIN DESCRIPTION - PROGRESSION: CLINICAL_PROGRESSION: GRADUALLY WORSENING

## 2021-02-17 ASSESSMENT — PAIN DESCRIPTION - LOCATION: LOCATION: BACK

## 2021-02-17 NOTE — PROGRESS NOTES
Pharmacy Consultation Note  (Antibiotic Dosing and Monitoring)    Initial consult date: 2/15/2021  Consulting physician: Dr. Nathaniel Bowles  Drug(s): Vancomycin  Indication: Sepsis    Ht Readings from Last 1 Encounters:   02/15/21 5' 4\" (1.626 m)     Wt Readings from Last 1 Encounters:   02/17/21 175 lb (79.4 kg)     Age/  Gender IBW DW  Allergy Information   31 y.o.  female 54.7 kg 64.3 kg  Cefepime and Toradol [ketorolac tromethamine]         Date  Tmax WBC Dialysis Drug/Dose Time   Given Level(s)   (Time) Comments   2/15  (#1) -- 8.1 PD Vancomycin 1500 mg IV x 1 1745     2/16  (#2) 99.4 14.2 PD No vancomycin -- Random level @ 0845 = 27.1 mcg/mL    2/17  (#3) 102 20 PD No vancomycin --       (#4)      Random level @ <0600> =       (#5)            (#6)            (#7)            Estimated Creatinine Clearance: 11 mL/min (A) (based on SCr of 7.9 mg/dL (H)). UOP over the past 24 hours:       Intake/Output Summary (Last 24 hours) at 2/17/2021 1134  Last data filed at 2/17/2021 0500  Gross per 24 hour   Intake 1280 ml   Output 700 ml   Net 580 ml       Anti-infective Regimen:  Anti-infective Dose Date Initiated Date Stopped   Ceftriaxone 1000 mg IV q24hr 2/15 2/17   Pip/tazo 3.375g IV q12hr 2/17      Cultures:  available culture and sensitivity results were reviewed in EPIC  Cultures sent and are pending. Culture Date Result    Blood cx 1 2/15 NGTD   Blood cx 2 2/15 NGTD                    Assessment:  · Consulted by Dr. Nathaniel Bowles to dose/monitor vancomycin  · Goal trough level:  15-20 mcg/mL, AUC/DEENA:   400-600  · Pt is a 31 yoF who presented from home in PEA arrest. Patient has hx of ESRD on peritoneal dialysis, diabetes, in DKA this admission, and multiple hospitalizations. Empiric antibiotics initiated for sepsis. · Hx ESRD, HD on peritoneal dialysis.   · 2/16: Random level = 27.1 mcg/mL    Plan:  · No dose today  · Random level tomorrow with AM labs  · Follow dialysis schedule for further dosing and levels · Pharmacist will follow and monitor/adjust dosing as necessary      Thank you for the consult,    Damian Peralta, PharmD, BCPS 2/17/2021 11:34 AM   Ext: [phone]

## 2021-02-17 NOTE — PLAN OF CARE
Problem: Restraint Use - Nonviolent/Non-Self-Destructive Behavior:  Goal: Absence of restraint indications  Description: Absence of restraint indications  2/16/2021 1944 by Corie Mosley RN  Outcome: Met This Shift  2/16/2021 0754 by Bailee Calderon RN  Outcome: Not Met This Shift  Goal: Absence of restraint-related injury  Description: Absence of restraint-related injury  2/16/2021 1944 by Corie Mosley RN  Outcome: Met This Shift  2/16/2021 0754 by Bailee Calderon RN  Outcome: Met This Shift     Problem: Breathing Pattern - Ineffective:  Goal: Ability to achieve and maintain a regular respiratory rate will improve  Description: Ability to achieve and maintain a regular respiratory rate will improve  Outcome: Met This Shift

## 2021-02-17 NOTE — PLAN OF CARE
Problem: Breathing Pattern - Ineffective:  Goal: Ability to achieve and maintain a regular respiratory rate will improve  Description: Ability to achieve and maintain a regular respiratory rate will improve  2/17/2021 0411 by Rosy Snowden RN  Outcome: Met This Shift  2/16/2021 1944 by Abiola Rivero RN  Outcome: Met This Shift     Problem: Pain:  Goal: Pain level will decrease  Description: Pain level will decrease  Outcome: Met This Shift  Goal: Control of acute pain  Description: Control of acute pain  Outcome: Met This Shift

## 2021-02-17 NOTE — PROGRESS NOTES
Subjective:     2-17:  Extubated 16th, on NC, off insulin gtt, hypoglycemia noted, , better LFT, HGB stable at 7.1. leukocytosis. FU chest imaging, adjusted abx    CO generalized pain for the last few days, mainly the back, worse with movement, better with resting, associated SOB, cought, confusion, nausea, gen weakness, no fever, chills, vomiting      PMH,Social Hx and Family Hx : Reviewed; no changes from initial note      Review of Systems    All other systems reviewed and OW negative    Objective:   Data Review:  Vital Signs: BP (!) 148/85   Pulse 109   Temp 100.6 °F (38.1 °C)   Resp 13   Ht 5' 4\" (1.626 m)   Wt 175 lb (79.4 kg)   SpO2 100%   BMI 30.04 kg/m²     24 Hour I&O Review:     Intake/Output Summary (Last 24 hours) at 2/17/2021 1154  Last data filed at 2/17/2021 0500  Gross per 24 hour   Intake 1280 ml   Output 700 ml   Net 580 ml       Physical Exam   VS: reviewed, trend noted  Level of Alertness:   AAOX3  CONSTITUTIONAL:  Awakes to touch  Throat: clear, no ulcerations or exudate  Ears:clear, no discharge  Neck:supple no LAP, or masses  LUNGS:  +crackles occ wheezes  CARDIOVASCULAR: RRR no M/R/G  ABDOMEN:  normal bowel sounds, distended and non-tender to palpation  EXT: No edema, no calf tenderness. Pulses are present bilaterally.   NEUROLOGIC:  Non focal GROSSLY , normal speech  SKIN:  normal skin color  Psych: flat affect      Continuous Infusions:   sodium chloride      dextrose Stopped (02/16/21 1400)         Current Medications: Current Facility-Administered Medications: insulin glargine (LANTUS) injection vial 6 Units, 6 Units, Subcutaneous, BID  metoprolol tartrate (LOPRESSOR) tablet 25 mg, 25 mg, Oral, BID  insulin lispro (HUMALOG) injection vial 0-12 Units, 0-12 Units, Subcutaneous, 4x Daily AC & HS piperacillin-tazobactam (ZOSYN) 3,375 mg in dextrose 5 % 50 mL IVPB extended infusion (mini-bag), 3,375 mg, Intravenous, Q12H **AND** 0.9 % sodium chloride infusion, 12.5 mL/hr, Intravenous, Q12H  potassium chloride 20 mEq/50 mL IVPB (Central Line), 20 mEq, Intravenous, PRN  pantoprazole (PROTONIX) injection 40 mg, 40 mg, Intravenous, Daily **AND** sodium chloride (PF) 0.9 % injection 10 mL, 10 mL, Intravenous, Daily  ipratropium-albuterol (DUONEB) nebulizer solution 1 ampule, 1 ampule, Inhalation, Q6H  epoetin nena-epbx (RETACRIT) injection 3,000 Units, 3,000 Units, Subcutaneous, Once per day on Mon Wed Fri **AND** epoetin nena-epbx (RETACRIT) injection 2,000 Units, 2,000 Units, Subcutaneous, Once per day on Mon Wed Fri  glucose (GLUTOSE) 40 % oral gel 15 g, 15 g, Oral, PRN  dextrose 50 % IV solution, 12.5 g, Intravenous, PRN  glucagon (rDNA) injection 1 mg, 1 mg, Intramuscular, PRN  dextrose 5 % solution, 100 mL/hr, Intravenous, PRN  levothyroxine (SYNTHROID) tablet 75 mcg, 75 mcg, Per NG tube, QAM AC  sodium chloride flush 0.9 % injection 10 mL, 10 mL, Intravenous, 2 times per day  sodium chloride flush 0.9 % injection 10 mL, 10 mL, Intravenous, PRN  polyethylene glycol (GLYCOLAX) packet 17 g, 17 g, Oral, Daily PRN  acetaminophen (TYLENOL) tablet 650 mg, 650 mg, Oral, Q6H PRN **OR** acetaminophen (TYLENOL) suppository 650 mg, 650 mg, Rectal, Q6H PRN  heparin (porcine) injection 5,000 Units, 5,000 Units, Subcutaneous, 3 times per day  vancomycin (VANCOCIN) intermittent dosing (placeholder), , Other, RX Placeholder    Ventilator Settings:Vent Information  $Ventilation: Off Vent  Vent Type: 980  Vent Mode: AC/VC  Vt Ordered: 0 mL  Rate Set: 0 bmp  Peak Flow: 50 L/min  Pressure Support: 5 cmH20  FiO2 : 30 %  SpO2: 100 %  SpO2/FiO2 ratio: 310  Sensitivity: 3  PEEP/CPAP: 5  I Time/ I Time %: 0 s   CBC:  Recent Labs     02/16/21  0446 02/16/21  1100 02/17/21  0448   WBC 12.6* 14.2* 20.0*   RBC 2.32* 2.60* 2.38* HGB 6.9* 7.6* 7.0*   HCT 20.4* 22.8* 21.3*    268 224   MCV 87.9 87.7 89.5   MCH 29.7 29.2 29.4   MCHC 33.8 33.3 32.9   RDW 16.7* 16.9* 17.6*      BMP:  Recent Labs     02/16/21  0845 02/16/21  1646 02/17/21  0448    130* 133   K 4.2 4.5 4.2   CL 98 96* 97*   CO2 21* 18* 23   BUN 51* 50* 48*   CREATININE 7.9* 8.5* 7.9*   CALCIUM 7.4* 7.1* 7.5*   GLUCOSE 133* 196* 104*      ABG:  Lab Results   Component Value Date    PH 7.365 02/16/2021    PH 7.290 09/08/2012    PCO2 34.1 02/16/2021    PO2 79.7 02/16/2021    HCO3 19.0 02/16/2021    O2SAT 93.8 02/16/2021       Cultures:  Recent Labs     02/15/21  0953   BC 24 Hours no growth     Recent Labs     02/15/21  1015   BLOODCULT2 24 Hours no growth     No results for input(s): CULTRESP in the last 72 hours. Radiology Review:  Pertinent images / reports were reviewed as a part of this visit. CT HEAD WO CONTRAST   Final Result   No acute intracranial abnormality. XR ABDOMEN FOR NG/OG/NE TUBE PLACEMENT   Final Result   1. ET tube in satisfactory position. No evidence of acute intrathoracic   disease. 2. NG tube tip in the distal stomach. 3. Nonspecific small bowel distension that could be related to ileus or   small-bowel obstruction. 4. Suspected ascites. XR CHEST PORTABLE   Final Result   1. ET tube in satisfactory position. No evidence of acute intrathoracic   disease. 2. NG tube tip in the distal stomach. 3. Nonspecific small bowel distension that could be related to ileus or   small-bowel obstruction. 4. Suspected ascites.           Other Diagnostic Testing:      Assessment:   DM1 with DKA, COMA, better, hypoglycemia noted, likely iatrogenic     PEA cardiac arrest, no neuro deficit     Acute resp failure with hypoxia, also intubated in the setting of unresponsiveness, extubated 16th     ESRD on PD     Acidosis      UTI unspecified site without hematuria    Hyperkalemia

## 2021-02-17 NOTE — PROGRESS NOTES
[place] [address] Hospital[place]   Progress Note    Admitting Date and Time: 2/15/2021  8:07 AM  Admit Dx: Cardiac arrest [facility]) [I46.9]    Subjective/interval history:    2/16: Patient was admitted yesterday afternoon with PEA cardiac arrest, sepsis due to urinary tract infection, hyperkalemia in setting of end-stage renal disease on peritoneal dialysis. This morning she is intubated, but awake, alert, appears anxious. 2/17: Patient was extubated yesterday afternoon. She is awake, alert, oriented x3, neurologically intact. Very anxious appearing. States she has upper and lower back pain. Per RN: Patient complaining of back pain. Chest x-ray shows worsening infiltrate on the right    ROS: Complete review of systems negative other than mentioned above.      insulin glargine  6 Units Subcutaneous BID    metoprolol tartrate  25 mg Oral BID    insulin lispro  0-12 Units Subcutaneous 4x Daily AC & HS    piperacillin-tazobactam  3,375 mg Intravenous Q12H    pantoprazole  40 mg Intravenous Daily    And    sodium chloride (PF)  10 mL Intravenous Daily    ipratropium-albuterol  1 ampule Inhalation Q6H    epoetin nena-epbx  3,000 Units Subcutaneous Once per day on Mon Wed Fri    And    epoetin nena-epbx  2,000 Units Subcutaneous Once per day on Mon Wed Fri    levothyroxine  75 mcg Per NG tube QAM AC    sodium chloride flush  10 mL Intravenous 2 times per day    heparin (porcine)  5,000 Units Subcutaneous 3 times per day    vancomycin (VANCOCIN) intermittent dosing (placeholder)   Other RX Placeholder         potassium chloride, 20 mEq, PRN      glucose, 15 g, PRN      dextrose, 12.5 g, PRN      glucagon (rDNA), 1 mg, PRN      dextrose, 100 mL/hr, PRN      sodium chloride flush, 10 mL, PRN      polyethylene glycol, 17 g, Daily PRN      acetaminophen, 650 mg, Q6H PRN    Or      acetaminophen, 650 mg, Q6H PRN         Objective: BP (!) 148/85   Pulse 109   Temp 100.6 °F (38.1 °C)   Resp 13   Ht 5' 4\" (1.626 m)   Wt 175 lb (79.4 kg)   SpO2 100%   BMI 30.04 kg/m²   General Appearance: Alert and oriented x3, appears anxious  Skin: warm and dry  Head: normocephalic and atraumatic  Eyes: pupils equal, round, and reactive to light, extraocular eye movements intact, conjunctivae normal  Neck: neck supple and non tender without mass   Pulmonary/Chest: Nonlabored. Mild rhonchi on the right, slightly diminished on the left.   Cardiovascular: normal rate, normal S1 and S2 and no carotid bruits  Abdomen: soft, non-distended, normal bowel sounds, no masses or organomegaly  Extremities: no cyanosis, no clubbing and no edema  Neurologic: Cranial nerves II through XII grossly intact, speech normal      Recent Labs     02/16/21  0845 02/16/21  1646 02/17/21  0448    130* 133   K 4.2 4.5 4.2   CL 98 96* 97*   CO2 21* 18* 23   BUN 51* 50* 48*   CREATININE 7.9* 8.5* 7.9*   GLUCOSE 133* 196* 104*   CALCIUM 7.4* 7.1* 7.5*       Recent Labs     02/15/21  1015 02/16/21  0446 02/17/21  0448   ALKPHOS 236* 169* 167*   PROT 4.4* 4.5* 4.5*   LABALBU 2.0* 2.1* 2.1*   BILITOT <0.2 <0.2 0.2   AST 1,767* 808* 169*   * 580* 338*       Recent Labs     02/16/21  0446 02/16/21  1100 02/17/21  0448   WBC 12.6* 14.2* 20.0*   RBC 2.32* 2.60* 2.38*   HGB 6.9* 7.6* 7.0*   HCT 20.4* 22.8* 21.3*   MCV 87.9 87.7 89.5   MCH 29.7 29.2 29.4   MCHC 33.8 33.3 32.9   RDW 16.7* 16.9* 17.6*    268 224   MPV 10.8 10.3 10.0       CBC:   Lab Results   Component Value Date    WBC 20.0 02/17/2021    RBC 2.38 02/17/2021    HGB 7.0 02/17/2021    HCT 21.3 02/17/2021    MCV 89.5 02/17/2021    MCH 29.4 02/17/2021    MCHC 32.9 02/17/2021    RDW 17.6 02/17/2021     02/17/2021    MPV 10.0 02/17/2021     CMP:    Lab Results   Component Value Date     02/17/2021    K 4.2 02/17/2021    K 5.4 02/15/2021    CL 97 02/17/2021    CO2 23 02/17/2021    BUN 48 02/17/2021 CREATININE 7.9 02/17/2021    GFRAA 7 02/17/2021    LABGLOM 7 02/17/2021    GLUCOSE 104 02/17/2021    GLUCOSE 130 05/18/2012    PROT 4.5 02/17/2021    LABALBU 2.1 02/17/2021    LABALBU 4.1 05/18/2012    CALCIUM 7.5 02/17/2021    BILITOT 0.2 02/17/2021    ALKPHOS 167 02/17/2021     02/17/2021     02/17/2021        Radiology:   XR CHEST PORTABLE   Final Result   Extensive primarily right-sided pulmonary infiltrates new since the prior exam      CT HEAD WO CONTRAST   Final Result   No acute intracranial abnormality. XR ABDOMEN FOR NG/OG/NE TUBE PLACEMENT   Final Result   1. ET tube in satisfactory position. No evidence of acute intrathoracic   disease. 2. NG tube tip in the distal stomach. 3. Nonspecific small bowel distension that could be related to ileus or   small-bowel obstruction. 4. Suspected ascites. XR CHEST PORTABLE   Final Result   1. ET tube in satisfactory position. No evidence of acute intrathoracic   disease. 2. NG tube tip in the distal stomach. 3. Nonspecific small bowel distension that could be related to ileus or   small-bowel obstruction. 4. Suspected ascites. Assessment/Plan:  Principal Problem:    Cardiac arrest ([facility] [address]  Active Problems:    Diabetic ketoacidosis with coma associated with type 1 diabetes mellitus ([address]    UTI (urinary tract infection)    Diabetes mellitus type 1, uncontrolled ([address]    Sepsis ([facility] [address]    Hypertension    ESRD on peritoneal dialysis ([facility] [address]    Shock liver  Resolved Problems:    * No resolved hospital problems. *      1. PEA cardiac arrest with successful resuscitation  -Possibly due to arrhythmia from electrolyte abnormalities.   She was only slightly hyperkalemic on blood chemistries, however this was after she was given treatment for probable hyperkalemia based on EKG findings  -Monitor electrolytes and correct as necessary  -Treating potential underlying causes including electrolyte abnormalities, DKA, sepsis    2.  Diabetic ketoacidosis in the setting of uncontrolled type 1 diabetes mellitus  -Off insulin drip, now on subcutaneous insulin    3. Sepsis due to urinary tract infection and right sided pneumonia  -Continue vancomycin and Zosyn    4. Hyperkalemia  -Resolved  -Continue peritoneal dialysis, nephrology following     5. Uncontrolled type 1 diabetes mellitus  -Basal and corrective scale insulin for now. Plan to resume insulin pump at discharge     6. End-stage renal disease on peritoneal dialysis  -Nephrology following for dialysis management     7. Shock liver  -Transaminases continue to improve, bilirubin normal    8. Essential HTN  -Metoprolol tartrate resumed today     Code Status: Full code  DVT prophylaxis: Subcutaneous heparin    Discussed with ICU RN    NOTE: This report was transcribed using voice recognition software. Every effort was made to ensure accuracy; however, inadvertent computerized transcription errors may be present.      Electronically signed by Brady Mora DO on 2/17/2021 at 2:24 PM

## 2021-02-17 NOTE — CARE COORDINATION
2/17/21 Negative covid 2/15/21. Cm transition of care: pt on room air, insulin gtt off. Pt from home peritioneal dialysis. pts mom is a nurse and lives with her. Pt does see Dr. Elizabeth Alejandra and is happy with the care she receives from him. CM/SS following.   Electronically signed by TOR Rousseau on 2/17/2021 at 12:32 PM

## 2021-02-17 NOTE — PLAN OF CARE
Problem: Skin Integrity:  Goal: Will show no infection signs and symptoms  Description: Will show no infection signs and symptoms  Outcome: Not Met This Shift  Goal: Absence of new skin breakdown  Description: Absence of new skin breakdown  Outcome: Met This Shift     Problem: Breathing Pattern - Ineffective:  Goal: Ability to achieve and maintain a regular respiratory rate will improve  Description: Ability to achieve and maintain a regular respiratory rate will improve  2/17/2021 1630 by Diana Jhaveri RN  Outcome: Not Met This Shift  2/17/2021 0411 by Oskar Vogt RN  Outcome: Met This Shift

## 2021-02-18 ENCOUNTER — APPOINTMENT (OUTPATIENT)
Dept: GENERAL RADIOLOGY | Age: 31
DRG: 710 | End: 2021-02-18
Payer: COMMERCIAL

## 2021-02-18 LAB
ALBUMIN SERPL-MCNC: 2.1 G/DL (ref 3.5–5.2)
ALP BLD-CCNC: 202 U/L (ref 35–104)
ALT SERPL-CCNC: 221 U/L (ref 0–32)
ANION GAP SERPL CALCULATED.3IONS-SCNC: 16 MMOL/L (ref 7–16)
AST SERPL-CCNC: 74 U/L (ref 0–31)
B.E.: -3.7 MMOL/L (ref -3–3)
BILIRUB SERPL-MCNC: 0.3 MG/DL (ref 0–1.2)
BUN BLDV-MCNC: 49 MG/DL (ref 6–20)
CALCIUM SERPL-MCNC: 7.8 MG/DL (ref 8.6–10.2)
CHLORIDE BLD-SCNC: 99 MMOL/L (ref 98–107)
CO2: 22 MMOL/L (ref 22–29)
COHB: 0.7 % (ref 0–1.5)
COMMENT: ABNORMAL
CREAT SERPL-MCNC: 8.4 MG/DL (ref 0.5–1)
CRITICAL: ABNORMAL
DATE ANALYZED: ABNORMAL
DATE OF COLLECTION: ABNORMAL
GFR AFRICAN AMERICAN: 7
GFR NON-AFRICAN AMERICAN: 7 ML/MIN/1.73
GLUCOSE BLD-MCNC: 149 MG/DL (ref 74–99)
HCO3: 21.4 MMOL/L (ref 22–26)
HCT VFR BLD CALC: 21.2 % (ref 34–48)
HEMOGLOBIN: 7.1 G/DL (ref 11.5–15.5)
HHB: 6.5 % (ref 0–5)
LAB: ABNORMAL
LACTIC ACID: 1.4 MMOL/L (ref 0.5–2.2)
Lab: ABNORMAL
MCH RBC QN AUTO: 29.7 PG (ref 26–35)
MCHC RBC AUTO-ENTMCNC: 33.5 % (ref 32–34.5)
MCV RBC AUTO: 88.7 FL (ref 80–99.9)
METER GLUCOSE: 103 MG/DL (ref 74–99)
METER GLUCOSE: 106 MG/DL (ref 74–99)
METER GLUCOSE: 138 MG/DL (ref 74–99)
METER GLUCOSE: 156 MG/DL (ref 74–99)
METER GLUCOSE: 171 MG/DL (ref 74–99)
METER GLUCOSE: 176 MG/DL (ref 74–99)
METER GLUCOSE: 94 MG/DL (ref 74–99)
METHB: 0 % (ref 0–1.5)
MODE: ABNORMAL
O2 CONTENT: 9.1 ML/DL
O2 SATURATION: 93.5 % (ref 92–98.5)
O2HB: 92.8 % (ref 94–97)
OPERATOR ID: ABNORMAL
PATIENT TEMP: 37 C
PCO2: 38.9 MMHG (ref 35–45)
PDW BLD-RTO: 17.2 FL (ref 11.5–15)
PH BLOOD GAS: 7.36 (ref 7.35–7.45)
PHOSPHORUS: 9.2 MG/DL (ref 2.5–4.5)
PLATELET # BLD: 222 E9/L (ref 130–450)
PMV BLD AUTO: 10.7 FL (ref 7–12)
PO2: 73.2 MMHG (ref 75–100)
POTASSIUM SERPL-SCNC: 4.4 MMOL/L (ref 3.5–5)
RBC # BLD: 2.39 E12/L (ref 3.5–5.5)
SODIUM BLD-SCNC: 137 MMOL/L (ref 132–146)
SOURCE, BLOOD GAS: ABNORMAL
THB: 6.9 G/DL (ref 11.5–16.5)
TIME ANALYZED: 2143
TOTAL PROTEIN: 5.3 G/DL (ref 6.4–8.3)
VANCOMYCIN RANDOM: 19.8 MCG/ML (ref 5–40)
WBC # BLD: 20.4 E9/L (ref 4.5–11.5)

## 2021-02-18 PROCEDURE — 6360000002 HC RX W HCPCS: Performed by: INTERNAL MEDICINE

## 2021-02-18 PROCEDURE — P9047 ALBUMIN (HUMAN), 25%, 50ML: HCPCS | Performed by: INTERNAL MEDICINE

## 2021-02-18 PROCEDURE — 6370000000 HC RX 637 (ALT 250 FOR IP): Performed by: INTERNAL MEDICINE

## 2021-02-18 PROCEDURE — 90945 DIALYSIS ONE EVALUATION: CPT

## 2021-02-18 PROCEDURE — 36600 WITHDRAWAL OF ARTERIAL BLOOD: CPT

## 2021-02-18 PROCEDURE — 99232 SBSQ HOSP IP/OBS MODERATE 35: CPT | Performed by: INTERNAL MEDICINE

## 2021-02-18 PROCEDURE — 82962 GLUCOSE BLOOD TEST: CPT

## 2021-02-18 PROCEDURE — 2000000000 HC ICU R&B

## 2021-02-18 PROCEDURE — 36415 COLL VENOUS BLD VENIPUNCTURE: CPT

## 2021-02-18 PROCEDURE — 87070 CULTURE OTHR SPECIMN AEROBIC: CPT

## 2021-02-18 PROCEDURE — 87077 CULTURE AEROBIC IDENTIFY: CPT

## 2021-02-18 PROCEDURE — 97161 PT EVAL LOW COMPLEX 20 MIN: CPT | Performed by: PHYSICAL THERAPIST

## 2021-02-18 PROCEDURE — 85027 COMPLETE CBC AUTOMATED: CPT

## 2021-02-18 PROCEDURE — 82805 BLOOD GASES W/O2 SATURATION: CPT

## 2021-02-18 PROCEDURE — 2700000000 HC OXYGEN THERAPY PER DAY

## 2021-02-18 PROCEDURE — 84100 ASSAY OF PHOSPHORUS: CPT

## 2021-02-18 PROCEDURE — 97530 THERAPEUTIC ACTIVITIES: CPT | Performed by: PHYSICAL THERAPIST

## 2021-02-18 PROCEDURE — 87040 BLOOD CULTURE FOR BACTERIA: CPT

## 2021-02-18 PROCEDURE — 87186 SC STD MICRODIL/AGAR DIL: CPT

## 2021-02-18 PROCEDURE — 83605 ASSAY OF LACTIC ACID: CPT

## 2021-02-18 PROCEDURE — 87206 SMEAR FLUORESCENT/ACID STAI: CPT

## 2021-02-18 PROCEDURE — 71045 X-RAY EXAM CHEST 1 VIEW: CPT

## 2021-02-18 PROCEDURE — 80053 COMPREHEN METABOLIC PANEL: CPT

## 2021-02-18 PROCEDURE — 2580000003 HC RX 258: Performed by: INTERNAL MEDICINE

## 2021-02-18 PROCEDURE — 94640 AIRWAY INHALATION TREATMENT: CPT

## 2021-02-18 PROCEDURE — 36592 COLLECT BLOOD FROM PICC: CPT

## 2021-02-18 PROCEDURE — 80202 ASSAY OF VANCOMYCIN: CPT

## 2021-02-18 PROCEDURE — C9113 INJ PANTOPRAZOLE SODIUM, VIA: HCPCS | Performed by: INTERNAL MEDICINE

## 2021-02-18 RX ORDER — MIDODRINE HYDROCHLORIDE 5 MG/1
10 TABLET ORAL
Status: DISCONTINUED | OUTPATIENT
Start: 2021-02-18 | End: 2021-02-20

## 2021-02-18 RX ORDER — ALBUMIN (HUMAN) 12.5 G/50ML
25 SOLUTION INTRAVENOUS EVERY 8 HOURS
Status: COMPLETED | OUTPATIENT
Start: 2021-02-18 | End: 2021-02-19

## 2021-02-18 RX ORDER — INSULIN GLARGINE 100 [IU]/ML
4 INJECTION, SOLUTION SUBCUTANEOUS 2 TIMES DAILY
Status: DISCONTINUED | OUTPATIENT
Start: 2021-02-18 | End: 2021-02-19

## 2021-02-18 RX ORDER — OXYCODONE HYDROCHLORIDE AND ACETAMINOPHEN 5; 325 MG/1; MG/1
1 TABLET ORAL EVERY 6 HOURS PRN
Status: DISCONTINUED | OUTPATIENT
Start: 2021-02-18 | End: 2021-02-20

## 2021-02-18 RX ORDER — INSULIN GLARGINE 100 [IU]/ML
4 INJECTION, SOLUTION SUBCUTANEOUS 2 TIMES DAILY
Status: CANCELLED | OUTPATIENT
Start: 2021-02-18

## 2021-02-18 RX ADMIN — Medication 10 ML: at 08:22

## 2021-02-18 RX ADMIN — HEPARIN SODIUM 5000 UNITS: 5000 INJECTION INTRAVENOUS; SUBCUTANEOUS at 13:41

## 2021-02-18 RX ADMIN — MIDODRINE HYDROCHLORIDE 10 MG: 5 TABLET ORAL at 17:53

## 2021-02-18 RX ADMIN — IPRATROPIUM BROMIDE AND ALBUTEROL SULFATE 1 AMPULE: .5; 3 SOLUTION RESPIRATORY (INHALATION) at 06:55

## 2021-02-18 RX ADMIN — HEPARIN SODIUM 5000 UNITS: 5000 INJECTION INTRAVENOUS; SUBCUTANEOUS at 06:17

## 2021-02-18 RX ADMIN — ACETAMINOPHEN 650 MG: 325 TABLET, FILM COATED ORAL at 08:21

## 2021-02-18 RX ADMIN — INSULIN GLARGINE 4 UNITS: 100 INJECTION, SOLUTION SUBCUTANEOUS at 20:32

## 2021-02-18 RX ADMIN — IPRATROPIUM BROMIDE AND ALBUTEROL SULFATE 1 AMPULE: .5; 3 SOLUTION RESPIRATORY (INHALATION) at 11:19

## 2021-02-18 RX ADMIN — ACETAMINOPHEN 650 MG: 325 TABLET, FILM COATED ORAL at 13:44

## 2021-02-18 RX ADMIN — INSULIN GLARGINE 4 UNITS: 100 INJECTION, SOLUTION SUBCUTANEOUS at 10:56

## 2021-02-18 RX ADMIN — LEVOTHYROXINE SODIUM 75 MCG: 75 TABLET ORAL at 06:17

## 2021-02-18 RX ADMIN — PANTOPRAZOLE SODIUM 40 MG: 40 INJECTION, POWDER, FOR SOLUTION INTRAVENOUS at 08:21

## 2021-02-18 RX ADMIN — SODIUM CHLORIDE 12.5 ML/HR: 9 INJECTION, SOLUTION INTRAVENOUS at 01:23

## 2021-02-18 RX ADMIN — HEPARIN SODIUM 5000 UNITS: 5000 INJECTION INTRAVENOUS; SUBCUTANEOUS at 22:08

## 2021-02-18 RX ADMIN — OXYCODONE 2.5 MG: 5 TABLET ORAL at 03:48

## 2021-02-18 RX ADMIN — ACETAMINOPHEN 650 MG: 325 TABLET, FILM COATED ORAL at 18:16

## 2021-02-18 RX ADMIN — ALBUMIN (HUMAN) 25 G: 0.25 INJECTION, SOLUTION INTRAVENOUS at 19:51

## 2021-02-18 RX ADMIN — Medication 10 ML: at 19:59

## 2021-02-18 RX ADMIN — MEROPENEM 500 MG: 500 INJECTION, POWDER, FOR SOLUTION INTRAVENOUS at 10:54

## 2021-02-18 RX ADMIN — IPRATROPIUM BROMIDE AND ALBUTEROL SULFATE 1 AMPULE: .5; 3 SOLUTION RESPIRATORY (INHALATION) at 22:20

## 2021-02-18 RX ADMIN — IPRATROPIUM BROMIDE AND ALBUTEROL SULFATE 1 AMPULE: .5; 3 SOLUTION RESPIRATORY (INHALATION) at 19:03

## 2021-02-18 RX ADMIN — METOPROLOL TARTRATE 25 MG: 25 TABLET, FILM COATED ORAL at 02:47

## 2021-02-18 RX ADMIN — SODIUM CHLORIDE, PRESERVATIVE FREE 10 ML: 5 INJECTION INTRAVENOUS at 08:21

## 2021-02-18 RX ADMIN — OXYCODONE AND ACETAMINOPHEN 1 TABLET: 5; 325 TABLET ORAL at 16:33

## 2021-02-18 RX ADMIN — INSULIN LISPRO 2 UNITS: 100 INJECTION, SOLUTION INTRAVENOUS; SUBCUTANEOUS at 17:21

## 2021-02-18 RX ADMIN — ACETAMINOPHEN 650 MG: 325 TABLET, FILM COATED ORAL at 03:42

## 2021-02-18 ASSESSMENT — PAIN SCALES - GENERAL
PAINLEVEL_OUTOF10: 9
PAINLEVEL_OUTOF10: 8

## 2021-02-18 ASSESSMENT — PAIN DESCRIPTION - DESCRIPTORS: DESCRIPTORS: SORE

## 2021-02-18 NOTE — PROGRESS NOTES
Subjective:     2-18:  Extubated 16th, on NC, off insulin gtt, hypoglycemia noted,  better LFT, HGB stable at 7.1. Requiring o2 supplementation, worsening lung infiltrates BL, worsening sepsis. Not on pressors       Patient CO SOB for the last few days, stable, worse with exertion, better with rest, associated occasional cough, gen weakness, generalized pain, no (fever,chills)      PMH,Social Hx and Family Hx : Reviewed; no changes from initial note      Review of Systems    All other systems reviewed and OW negative    Objective:   Data Review:  Vital Signs: BP (!) 88/52   Pulse 111   Temp 98.9 °F (37.2 °C) (Oral)   Resp 13   Ht 5' 4\" (1.626 m)   Wt 173 lb (78.5 kg)   SpO2 92%   BMI 29.70 kg/m²     24 Hour I&O Review:     Intake/Output Summary (Last 24 hours) at 2/18/2021 1335  Last data filed at 2/18/2021 5387  Gross per 24 hour   Intake 105 ml   Output 0 ml   Net 105 ml       Physical Exam   VS: reviewed, trend noted  Level of Alertness:   AAOX3, in distress  CONSTITUTIONAL:  Awakes to touch  Throat: clear, no ulcerations or exudate  Ears:clear, no discharge  Neck:supple no LAP, or masses  LUNGS:  +crackles occ wheezes, using accessory muscles  CARDIOVASCULAR: RRR no M/R/G  ABDOMEN:  normal bowel sounds, distended and non-tender to palpation  EXT: No edema, no calf tenderness. Pulses are present bilaterally.   NEUROLOGIC:  Non focal GROSSLY , normal speech  SKIN:  normal skin color  Psych: flat affect      Continuous Infusions:   phenylephrine (THUAN-SYNEPHRINE) 50mg/250mL infusion      dextrose Stopped (02/16/21 1400)         Current Medications: Current Facility-Administered Medications: insulin glargine (LANTUS) injection vial 4 Units, 4 Units, Subcutaneous, BID  meropenem (MERREM) 500 mg IVPB (mini-bag), 500 mg, Intravenous, Q24H  phenylephrine (THUAN-SYNEPHRINE) 50 mg in dextrose 5 % 250 mL infusion,  mcg/min, Intravenous, Continuous insulin lispro (HUMALOG) injection vial 0-12 Units, 0-12 Units, Subcutaneous, 4x Daily AC & HS  potassium chloride 20 mEq/50 mL IVPB (Central Line), 20 mEq, Intravenous, PRN  pantoprazole (PROTONIX) injection 40 mg, 40 mg, Intravenous, Daily **AND** sodium chloride (PF) 0.9 % injection 10 mL, 10 mL, Intravenous, Daily  ipratropium-albuterol (DUONEB) nebulizer solution 1 ampule, 1 ampule, Inhalation, Q6H  epoetin nena-epbx (RETACRIT) injection 3,000 Units, 3,000 Units, Subcutaneous, Once per day on Mon Wed Fri **AND** epoetin nena-epbx (RETACRIT) injection 2,000 Units, 2,000 Units, Subcutaneous, Once per day on Mon Wed Fri  glucose (GLUTOSE) 40 % oral gel 15 g, 15 g, Oral, PRN  dextrose 50 % IV solution, 12.5 g, Intravenous, PRN  glucagon (rDNA) injection 1 mg, 1 mg, Intramuscular, PRN  dextrose 5 % solution, 100 mL/hr, Intravenous, PRN  levothyroxine (SYNTHROID) tablet 75 mcg, 75 mcg, Per NG tube, QAM AC  sodium chloride flush 0.9 % injection 10 mL, 10 mL, Intravenous, 2 times per day  sodium chloride flush 0.9 % injection 10 mL, 10 mL, Intravenous, PRN  polyethylene glycol (GLYCOLAX) packet 17 g, 17 g, Oral, Daily PRN  acetaminophen (TYLENOL) tablet 650 mg, 650 mg, Oral, Q6H PRN **OR** acetaminophen (TYLENOL) suppository 650 mg, 650 mg, Rectal, Q6H PRN  heparin (porcine) injection 5,000 Units, 5,000 Units, Subcutaneous, 3 times per day  vancomycin (VANCOCIN) intermittent dosing (placeholder), , Other, RX Placeholder    Ventilator Settings:Vent Information  $Ventilation: Off Vent  Vent Type: 980  Vent Mode: AC/VC  Vt Ordered: 0 mL  Rate Set: 0 bmp  Peak Flow: 50 L/min  Pressure Support: 5 cmH20  FiO2 : 30 %  SpO2: 92 %  SpO2/FiO2 ratio: 310  Sensitivity: 3  PEEP/CPAP: 5  I Time/ I Time %: 0 s   CBC:  Recent Labs     02/16/21  1100 02/17/21  0448 02/18/21  0522   WBC 14.2* 20.0* 20.4*   RBC 2.60* 2.38* 2.39*   HGB 7.6* 7.0* 7.1*   HCT 22.8* 21.3* 21.2*    224 222   MCV 87.7 89.5 88.7   MCH 29.2 29.4 29.7 MCHC 33.3 32.9 33.5   RDW 16.9* 17.6* 17.2*      BMP:  Recent Labs     02/16/21  1646 02/17/21  0448 02/17/21  2118 02/18/21  0522   * 133  --  137   K 4.5 4.2  --  4.4   CL 96* 97*  --  99   CO2 18* 23  --  22   BUN 50* 48*  --  49*   CREATININE 8.5* 7.9*  --  8.4*   CALCIUM 7.1* 7.5*  --  7.8*   GLUCOSE 196* 104* 179* 149*      ABG:  Lab Results   Component Value Date    PH 7.350 02/17/2021    PH 7.290 09/08/2012    PCO2 32.4 02/17/2021    PO2 63.6 02/17/2021    HCO3 17.5 02/17/2021    O2SAT 89.7 02/17/2021       Cultures:  No results for input(s): BC in the last 72 hours. No results for input(s): X[employer]g [employer] in the last 72 hours. No results for input(s): CULTRESP in the last 72 hours. Radiology Review:  Pertinent images / reports were reviewed as a part of this visit. XR CHEST PORTABLE   Final Result   Improvement in right-sided opacities and worsening in left-sided airspace   opacities. Finding may represent pulmonary edema versus atelectasis. XR CHEST PORTABLE   Final Result   Extensive primarily right-sided pulmonary infiltrates new since the prior exam      CT HEAD WO CONTRAST   Final Result   No acute intracranial abnormality. XR ABDOMEN FOR NG/OG/NE TUBE PLACEMENT   Final Result   1. ET tube in satisfactory position. No evidence of acute intrathoracic   disease. 2. NG tube tip in the distal stomach. 3. Nonspecific small bowel distension that could be related to ileus or   small-bowel obstruction. 4. Suspected ascites. XR CHEST PORTABLE   Final Result   1. ET tube in satisfactory position. No evidence of acute intrathoracic   disease. 2. NG tube tip in the distal stomach. 3. Nonspecific small bowel distension that could be related to ileus or   small-bowel obstruction. 4. Suspected ascites.       XR CHEST PORTABLE    (Results Pending)       Other Diagnostic Testing:      Assessment:   DM1 with DKA, COMA, better, hypoglycemia noted, likely iatrogenic    PEA cardiac arrest, no neuro deficit     Acute resp failure with hypoxia, also intubated in the setting of unresponsiveness, extubated 16th, on 12 LNC, CXR worsening BL infiltrates (ARDS, contusion, HAP?)     ESRD on PD     Acidosis      UTI unspecified site without hematuria    Severe sepsis without shock    Hyperkalemia     Anemia of chronic kidney disease, stable,  no active bleed noted      Plan:     o2 to keep sat> 90%, pulm toileting, FU chest imaging, may need NIV    Monitor hemodynamics, trend lactate, may need pressors    monitor BS, RFP, replace electrolytes, adjusting insulin orders as needed     FU nephrology recs, PD     Abx, fu cx    Monitor CBC, PRBC to keep >7 as needed     Discussed with RN, RT re management     ICU Staff Physician note of personal involvement in Care  As the attending physician, I certify that I personally reviewed the patient's history and personally examined the patient to confirm the physical findings described above,  And that I reviewed the relevant imaging studies and available reports.       This patient has a high probability of sudden, clinically significant deterioration, which requires the highest level of physician preparedness to intervene urgently.  I managed/supervised life or organ supporting interventions that required frequent physician assessment.   I devoted my full attention to the direct care of this patient for the amount of time indicated below.  Time I spent with the family or surrogate(s) is included only if the patient was incapable of providing the necessary information or participating in medical decisions - Time devoted to teaching and to any procedures I billed separately is not included.     Critical care time > 50 minutes      Electronically signed by Amanda Fletcher MD on 2/18/2021 at 1:35 PM

## 2021-02-18 NOTE — CARE COORDINATION
2/18/21 Negative covid 2/15/21. Cm transition of care  [facility]/pressor- Pt from home peritioneal dialysis. pts mom is a nurse and lives with her. Therapies recommending ISAIAH- CM will speak to patient about this recommendation. CM/SS following.  Electronically signed by Charleen Mckinney RN-BC on 2/18/2021 at 1:41 PM

## 2021-02-18 NOTE — PROGRESS NOTES
Physical Therapy Initial Evaluation    Room #:  IC10/IC10-01  Patient Name: Morris Akhtar  YOB: 1990  MRN: [id number]    Referring Provider:   Donato Hobson MD     Date of Service: 2021    Evaluating Physical Therapist: Miguel A Sun, PT #[id number]       Diagnosis:   Cardiac arrest ([facility] [address] [I46.9]    unresponsiveness.  cardiac arrest    Patient Active Problem List   Diagnosis    High-risk pregnancy    Diabetic ketoacidosis with coma associated with type 1 diabetes mellitus ([address]    Previous stillbirth or demise, antepartum    Non compliance w medication regimen    UTI (urinary tract infection)    Tobacco smoking complicating pregnancy    Drug use complicating pregnancy in third trimester    MTHFR mutation ([address]    Poorly controlled type 1 diabetes mellitus ([address]    Diabetes mellitus type 1, uncontrolled ([address]    Previous  delivery affecting pregnancy, antepartum    Opioid abuse, episodic ([address]    Tobacco use    Sepsis ([address]    Hypoglycemia    Hypertension    Type 1 diabetes mellitus with other specified complication ([address]    Diabetic gastroparesis associated with type 1 diabetes mellitus (HCC)    Iron deficiency anemia    Sinus tachycardia    Bladder dysfunction    Acute heart failure with preserved ejection fraction (HCC)    Chronic kidney disease    Severe protein-calorie malnutrition ([address]    ESRD (end stage renal disease) ([address]    Uncontrolled type 1 diabetes mellitus with hyperglycemia, with long-term current use of insulin ([address]    ESRD on peritoneal dialysis ([address]    Hypothyroidism    Hyperlipidemia    Acute cystitis without hematuria    Nondisplaced fracture of neck of fifth metacarpal bone, left hand, initial encounter for closed fracture    Acute encephalopathy    Effusion of left knee    Acute metabolic encephalopathy    Chronic right-sided low back pain    Endocarditis    Chronic diarrhea    Valvular endocarditis  LAPAROSCOPY INSERTION PERITONEAL CATHETER N/A 6/26/2020    LAPAROSCOPIC INSERTION PERITONEAL DIALYSIS CATHETER performed by Hailey Lopez MD at [address] ESOPHAGOGASTRODUODENOSCOPY TRANSORAL DIAGNOSTIC N/A 5/30/2018    EGD ESOPHAGOGASTRODUODENOSCOPY performed by Gonzales Narayan MD at [address] [facility] TRANSESOPHAGEAL ECHOCARDIOGRAM N/A 10/19/2020    TRANSESOPHAGEAL ECHOCARDIOGRAM WITH BUBBLE STUDY performed by Komal De La O MD at [facility] [address] [address]  04/2018    UPPER GASTROINTESTINAL ENDOSCOPY  12/18/2018    EGD BIOPSY performed by Dayton Sneed MD at [facility] N/A 10/11/2019    EGD ESOPHAGOGASTRODUODENOSCOPY performed by Debora Perez DO at [facility] ENDOSCOPY       Precautions: Use lift equipment for lifting patient , falls, alarm, O2 and lethargy\ ,  end-stage renal disease on peritoneal dialysis,    SUBJECTIVE:    Social history: Patient lives with mom and her 2 children   in a [place] home  with 8 steps  to enter with [address] shower     Equipment owned: None,       [id number] [facility] Mobility Inpatient   How much difficulty turning over in bed?: A Lot  How much difficulty sitting down on / standing up from a chair with arms?: Unable  How much difficulty moving from lying on back to sitting on side of bed?: A Lot  How much help from another person moving to and from a bed to a chair?: Total  How much help from another person needed to walk in hospital room?: Total  How much help from another person for climbing 3-5 steps with a railing?: Total  AM-PAC Inpatient Mobility Raw Score : 8  AM-PAC Inpatient T-Scale Score : 28.52  Mobility Inpatient CMS 0-100% Score: 86.62  Mobility Inpatient CMS G-Code Modifier : CM    Nursing cleared patient for PT evaluation. The admitting diagnosis and active problem list as listed above have been reviewed prior to the initiation of this evaluation.        OBJECTIVE; Initial Evaluation  Date: 2/18/2021 Treatment Date:     Short Term/ Long Term   Goals   Was pt agreeable to Eval/treatment? Yes    To be met in 5 days   Pain level   Unable to rate c/o back pain        Bed Mobility    Rolling: Moderate assist of 1    Supine to sit: Maximal assist of 1    Sit to supine: Maximal assist of 1    Scooting: Maximal assist of 1    Rolling: Minimal assist of 1    Supine to sit: Minimal assist of 1    Sit to supine: Minimal assist of 1    Scooting: Minimal assist of 1     Transfers Sit to stand: Not assessed  too lethargic  Sit to stand:  Moderate assist of 1     Ambulation    not assessed     10 feet using  wheeled walker with Moderate assist of 1    Stair negotiation: ascended and descended   Not assessed            ROM Within functional limits        Strength BUE:  3-/5  RLE:  3-/5  LLE:  3-/5   Increase strength in affected mm groups by 1/3 grade   Balance Sitting EOB:  poor moderate assist throughout right lateral lean maximum cueing for upright  Dynamic Standing:  not assessed    Sitting EOB:  fair +  Dynamic Standing: fair wheeled walker      Patient is Alert & Oriented x person and follows one step directions 50%  Sensation:  Patient  denies numbness and tingling     Edema:  yes bilateral lower extremities and bilateral upper extremities slight  Endurance: poor      Vitals: 12 liters high flow nasal cannula    Blood Pressure at rest 99/70 Blood Pressure during session 86/55   Heart Rate at rest 114 Heart Rate during session     SPO2 at rest 89-90%  SPO2 during session 85-90% nursing present increased O2 15 liters     Patient education  Patient educated on role of Physical Therapy, risks of immobility, safety and plan of care,  importance of mobility while in hospital , ankle pumps, quad set and glut set for edema control, blood clot prevention and positioning for skin integrity and comfort      Patient response to education: Pt verbalized understanding Pt demonstrated skill Pt requires further education in this area   Yes Partial Yes      Treatment:  Patient practiced and was instructed/facilitated in the following treatment: Patient assisted to Edge of bed  Sat edge of bed 20 minutes with Moderate assist of 1 to increase dynamic sitting balance and activity tolerance. maximum cueing for eyes open, head up and midline posture, improves few minutes however requires constant stimuli to keep eyes open and sit midline. Performed a/a exercises. Weight shifting. Returned to bed, rolling for bed linen adjustment. Dependent assist of 1 to scoot to Head of bed in trendelenburg        Therapeutic Exercises:  ankle pumps and long arc quad  x 15 reps. At end of session, patient in bed with alarm call light and phone within reach,   all lines and tubes intact, nursing notified. Patient would benefit from continued skilled Physical Therapy to improve functional independence and quality of life.           Patient's/ family goals   none stated        ASSESSMENT: Patient exhibits decreased strength, balance, endurance and lethargy impairing functional mobility, transfers, gait , tolerance to activity and participation requires constant cueing to keep eyes open and follow commands     Plan of Care:     -Bed Mobility: Lower extremity exercises  and Upper extremity exercises   -Sitting Balance: Hands on support to maintain midline , Facilitate active trunk muscle engagement  and Facilitate postural control in all planes   -Standing Balance: Perform strengthening exercises in standing to promote motor control with or without upper extremity support  and Instruct patient on adequate base of support to maintain balance -Transfers: Provide instruction on proper hand and foot position for adequate transfer of weight onto lower extremities and use of gait device, Facilitate weight shift forward on to lower extremities and provide necessary stabilization of bilateral lower extremities  and Provide stabilization to prevent fall   -Gait: Gait training and Standing activities to improve: base of support, weight shift, weight bearing    -Endurance: Utilize Supervised activities to increase level of endurance to allow for safe functional mobility including transfers and gait  and Use graduated activities to promote good breathing techniques and provide support and education to maximize respiratory function    Patient and or family understand(s) diagnosis, prognosis, and plan of care. Frequency of treatments: Patient will be seen  daily. Time in  0910  Time out  0943    Total Treatment Time  23 minutes    Evaluation time includes thorough review of current medical information, gathering information on past medical history/social history and prior level of function, completion of standardized testing/informal observation of tasks, assessment of data, and development of Plan of care and goals.      CPT codes:  Low Complexity PT evaluation (71582)  Therapeutic activities (55076)   23 minutes  2 unit(s)    Villa Syed, PT

## 2021-02-18 NOTE — PROGRESS NOTES
[address] [place], [place] [address]ist   Progress Note    Admitting Date and Time: 2/15/2021  8:07 AM  Admit Dx: Cardiac arrest [facility]) [I46.9]    Subjective/interval history:    2/16: Patient was admitted yesterday afternoon with PEA cardiac arrest, sepsis due to urinary tract infection, hyperkalemia in setting of end-stage renal disease on peritoneal dialysis. This morning she is intubated, but awake, alert, appears anxious. 2/17: Patient was extubated yesterday afternoon. She is awake, alert, oriented x3, neurologically intact. Very anxious appearing. States she has upper and lower back pain. 2/18: Patient awake, alert, somewhat somnolent, awakens easily to voice, but appears anxious when awake. She has pain all over and has generalized weakness. ROS: Complete review of systems negative other than mentioned above.      insulin glargine  6 Units Subcutaneous BID    metoprolol tartrate  25 mg Oral BID    insulin lispro  0-12 Units Subcutaneous 4x Daily AC & HS    piperacillin-tazobactam  3,375 mg Intravenous Q12H    pantoprazole  40 mg Intravenous Daily    And    sodium chloride (PF)  10 mL Intravenous Daily    ipratropium-albuterol  1 ampule Inhalation Q6H    epoetin nena-epbx  3,000 Units Subcutaneous Once per day on Mon Wed Fri    And    epoetin nena-epbx  2,000 Units Subcutaneous Once per day on Mon Wed Fri    levothyroxine  75 mcg Per NG tube QAM AC    sodium chloride flush  10 mL Intravenous 2 times per day    heparin (porcine)  5,000 Units Subcutaneous 3 times per day    vancomycin (VANCOCIN) intermittent dosing (placeholder)   Other RX Placeholder         oxyCODONE, 2.5 mg, Q4H PRN      potassium chloride, 20 mEq, PRN      glucose, 15 g, PRN      dextrose, 12.5 g, PRN      glucagon (rDNA), 1 mg, PRN      dextrose, 100 mL/hr, PRN      sodium chloride flush, 10 mL, PRN      polyethylene glycol, 17 g, Daily PRN      acetaminophen, 650 mg, Q6H PRN    Or   acetaminophen, 650 mg, Q6H PRN         Objective:    BP 99/70   Pulse 123   Temp 102.9 °F (39.4 °C) (Axillary)   Resp 19   Ht 5' 4\" (1.626 m)   Wt 173 lb (78.5 kg)   SpO2 91%   BMI 29.70 kg/m²   General Appearance: Alert and oriented x3, somnolent but appears anxious when awake  Skin: warm and dry  Head: normocephalic and atraumatic  Eyes: pupils equal, round, and reactive to light, extraocular eye movements intact, conjunctivae normal  Neck: neck supple and non tender without mass   Pulmonary/Chest: Nonlabored. Mild rhonchi on the right, slightly diminished on the left.   Cardiovascular: normal rate, normal S1 and S2 and no carotid bruits  Abdomen: soft, non-distended, normal bowel sounds, no masses or organomegaly  Extremities: no cyanosis, no clubbing and no edema  Neurologic: Cranial nerves II through XII grossly intact, speech normal      Recent Labs     02/16/21  1646 02/17/21 0448 02/17/21 2118 02/18/21  0522   * 133  --  137   K 4.5 4.2  --  4.4   CL 96* 97*  --  99   CO2 18* 23  --  22   BUN 50* 48*  --  49*   CREATININE 8.5* 7.9*  --  8.4*   GLUCOSE 196* 104* 179* 149*   CALCIUM 7.1* 7.5*  --  7.8*       Recent Labs     02/16/21  0446 02/17/21 0448 02/18/21  0522   ALKPHOS 169* 167* 202*   PROT 4.5* 4.5* 5.3*   LABALBU 2.1* 2.1* 2.1*   BILITOT <0.2 0.2 0.3   * 169* 74*   * 338* 221*       Recent Labs     02/16/21  1100 02/17/21  0448 02/18/21  0522   WBC 14.2* 20.0* 20.4*   RBC 2.60* 2.38* 2.39*   HGB 7.6* 7.0* 7.1*   HCT 22.8* 21.3* 21.2*   MCV 87.7 89.5 88.7   MCH 29.2 29.4 29.7   MCHC 33.3 32.9 33.5   RDW 16.9* 17.6* 17.2*    224 222   MPV 10.3 10.0 10.7       CBC:   Lab Results   Component Value Date    WBC 20.4 02/18/2021    RBC 2.39 02/18/2021    HGB 7.1 02/18/2021    HCT 21.2 02/18/2021    MCV 88.7 02/18/2021    MCH 29.7 02/18/2021    MCHC 33.5 02/18/2021    RDW 17.2 02/18/2021     02/18/2021    MPV 10.7 02/18/2021     CMP:    Lab Results Component Value Date     02/18/2021    K 4.4 02/18/2021    K 5.4 02/15/2021    CL 99 02/18/2021    CO2 22 02/18/2021    BUN 49 02/18/2021    CREATININE 8.4 02/18/2021    GFRAA 7 02/18/2021    LABGLOM 7 02/18/2021    GLUCOSE 149 02/18/2021    GLUCOSE 130 05/18/2012    PROT 5.3 02/18/2021    LABALBU 2.1 02/18/2021    LABALBU 4.1 05/18/2012    CALCIUM 7.8 02/18/2021    BILITOT 0.3 02/18/2021    ALKPHOS 202 02/18/2021    AST 74 02/18/2021     02/18/2021        Radiology:   XR CHEST PORTABLE   Final Result   Extensive primarily right-sided pulmonary infiltrates new since the prior exam      CT HEAD WO CONTRAST   Final Result   No acute intracranial abnormality. XR ABDOMEN FOR NG/OG/NE TUBE PLACEMENT   Final Result   1. ET tube in satisfactory position. No evidence of acute intrathoracic   disease. 2. NG tube tip in the distal stomach. 3. Nonspecific small bowel distension that could be related to ileus or   small-bowel obstruction. 4. Suspected ascites. XR CHEST PORTABLE   Final Result   1. ET tube in satisfactory position. No evidence of acute intrathoracic   disease. 2. NG tube tip in the distal stomach. 3. Nonspecific small bowel distension that could be related to ileus or   small-bowel obstruction. 4. Suspected ascites. Assessment/Plan:  Principal Problem:    Cardiac arrest ([facility] [address]  Active Problems:    Diabetic ketoacidosis with coma associated with type 1 diabetes mellitus ([facility] [address]    UTI (urinary tract infection)    Diabetes mellitus type 1, uncontrolled ([facility] [address]    Sepsis ([address]    Hypertension    ESRD on peritoneal dialysis ([facility] [address]    Shock liver  Resolved Problems:    * No resolved hospital problems. *      1. PEA cardiac arrest with successful resuscitation  -Possibly due to arrhythmia from electrolyte abnormalities.   She was only slightly hyperkalemic on blood chemistries, however this was after she was given treatment for probable hyperkalemia based on EKG findings

## 2021-02-18 NOTE — PROGRESS NOTES
Pharmacy Consultation Note  (Antibiotic Dosing and Monitoring)    Initial consult date: 2/15/2021  Consulting physician: Dr. Saulo Patel  Drug(s): Vancomycin  Indication: Sepsis    Ht Readings from Last 1 Encounters:   02/15/21 5' 4\" (1.626 m)     Wt Readings from Last 1 Encounters:   02/18/21 173 lb (78.5 kg)     Age/  Gender IBW DW  Allergy Information   31 y.o.  female 54.7 kg 64.3 kg  Cefepime and Toradol [ketorolac tromethamine]         Date  Tmax WBC Dialysis Drug/Dose Time   Given Level(s)   (Time) Comments   2/15  (#1) -- 8.1 PD Vancomycin 1500 mg IV x 1 1745     2/16  (#2) 99.4 14.2 PD No vancomycin -- Random level @ 0845 = 27.1 mcg/mL    2/17  (#3) 102 20 PD No vancomycin --     2/18  (#4) 102.9 20.4 PD No vancomycin -- Random level @ 0522 = 19.8 mcg/mL      (#5)            (#6)            (#7)            Estimated Creatinine Clearance: 10 mL/min (A) (based on SCr of 8.4 mg/dL (HH)). UOP over the past 24 hours:       Intake/Output Summary (Last 24 hours) at 2/18/2021 0910  Last data filed at 2/18/2021 6456  Gross per 24 hour   Intake 105 ml   Output 0 ml   Net 105 ml       Anti-infective Regimen:  Anti-infective Dose Date Initiated Date Stopped   Ceftriaxone 1000 mg IV q24hr 2/15 2/17   Pip/tazo 3.375g IV q12hr 2/17      Cultures:  available culture and sensitivity results were reviewed in EPIC  Cultures sent and are pending. Culture Date Result    Blood cx 1 2/15 NGTD   Blood cx 2 2/15 NGTD                    Assessment:  · Consulted by Dr. Saulo Patel to dose/monitor vancomycin  · Goal trough level:  15-20 mcg/mL, AUC/DEENA:   400-600  · Pt is a 31 yoF who presented from home in PEA arrest. Patient has hx of ESRD on peritoneal dialysis, diabetes, in DKA this admission, and multiple hospitalizations. Empiric antibiotics initiated for sepsis. · Hx ESRD, HD on peritoneal dialysis.   · 2/16: Random level = 27.1 mcg/mL  · 2/18: Random level = 19.8 mcg/mL    Plan:  · No dose today

## 2021-02-18 NOTE — PROGRESS NOTES
CCPD tx initiated using aseptic technique. TV [id number], Fill volume 2000mls x 4 exchanged, last fill 2000mls. All 2.5 % dextrose solution used. Dressing changed, site benign.  Report given to [facility]

## 2021-02-18 NOTE — PLAN OF CARE
Problem: Skin Integrity:  Goal: Will show no infection signs and symptoms  Description: Will show no infection signs and symptoms  Outcome: Met This Shift  Goal: Absence of new skin breakdown  Description: Absence of new skin breakdown  Outcome: Met This Shift     Problem: Breathing Pattern - Ineffective:  Goal: Ability to achieve and maintain a regular respiratory rate will improve  Description: Ability to achieve and maintain a regular respiratory rate will improve  Outcome: Met This Shift     Problem: Pain:  Goal: Pain level will decrease  Description: Pain level will decrease  Outcome: Met This Shift  Goal: Control of acute pain  Description: Control of acute pain  Outcome: Met This Shift  Goal: Control of chronic pain  Description: Control of chronic pain  Outcome: Met This Shift

## 2021-02-18 NOTE — PROGRESS NOTES
Called hospitalist group, per Dr. Geoffrey Thakkar ok to give 2200 dose of metoprolol now for hypertension and tachycardia since pt is alert enough to tolerate PO

## 2021-02-18 NOTE — PROGRESS NOTES
Department of Internal Medicine  Nephrology Attending Consult Note    Events reviewed. SUBJECTIVE:  We are following Wilton Flores for ESRD on Peritoneal Dialysis. Reports generalized muscle aches.     PHYSICAL EXAM:      Vitals:    VITALS:  BP (!) 101/49   Pulse 99   Temp 98 °F (36.7 °C)   Resp 18   Ht 5' 4\" (1.626 m)   Wt 173 lb (78.5 kg)   SpO2 97%   BMI 29.70 kg/m²   24HR INTAKE/OUTPUT:      Intake/Output Summary (Last 24 hours) at 2/18/2021 1835  Last data filed at 2/18/2021 7721  Gross per 24 hour   Intake 105 ml   Output 0 ml   Net 105 ml     PD Catheter Exam:  PD catheter exit site clean    Constitutional: Awake, chronically ill  HEENT:  Normocephalic, PERRL  Respiratory: Decreased breath sounds on the basis  Cardiovascular/Edema:  RRR, S1/S2  Gastrointestinal:  Soft, PD catheter exit site clean   Neurologic:  Nonfocal, AVELAR  Skin:  Warm, dry, no lesions  Other:  +edema    Scheduled Meds:   insulin glargine  4 Units Subcutaneous BID    meropenem  500 mg Intravenous Q24H    midodrine  10 mg Oral TID WC    insulin lispro  0-12 Units Subcutaneous 4x Daily AC & HS    pantoprazole  40 mg Intravenous Daily    And    sodium chloride (PF)  10 mL Intravenous Daily    ipratropium-albuterol  1 ampule Inhalation Q6H    epoetin nena-epbx  3,000 Units Subcutaneous Once per day on Mon Wed Fri    And    epoetin nena-epbx  2,000 Units Subcutaneous Once per day on Mon Wed Fri    levothyroxine  75 mcg Per NG tube QAM AC    sodium chloride flush  10 mL Intravenous 2 times per day    heparin (porcine)  5,000 Units Subcutaneous 3 times per day    vancomycin (VANCOCIN) intermittent dosing (placeholder)   Other RX Placeholder     Continuous Infusions:   phenylephrine (THUAN-SYNEPHRINE) 50mg/250mL infusion      dextrose Stopped (02/16/21 1400) PRN Meds:.oxyCODONE-acetaminophen, potassium chloride, glucose, dextrose, glucagon (rDNA), dextrose, sodium chloride flush, polyethylene glycol, acetaminophen **OR** acetaminophen    DATA:    CBC:   Lab Results   Component Value Date    WBC 20.4 02/18/2021    RBC 2.39 02/18/2021    HGB 7.1 02/18/2021    HCT 21.2 02/18/2021    MCV 88.7 02/18/2021    MCH 29.7 02/18/2021    MCHC 33.5 02/18/2021    RDW 17.2 02/18/2021     02/18/2021    MPV 10.7 02/18/2021     CMP:    Lab Results   Component Value Date     02/18/2021    K 4.4 02/18/2021    K 5.4 02/15/2021    CL 99 02/18/2021    CO2 22 02/18/2021    BUN 49 02/18/2021    CREATININE 8.4 02/18/2021    GFRAA 7 02/18/2021    LABGLOM 7 02/18/2021    GLUCOSE 149 02/18/2021    GLUCOSE 130 05/18/2012    PROT 5.3 02/18/2021    LABALBU 2.1 02/18/2021    LABALBU 4.1 05/18/2012    CALCIUM 7.8 02/18/2021    BILITOT 0.3 02/18/2021    ALKPHOS 202 02/18/2021    AST 74 02/18/2021     02/18/2021     Magnesium:    Lab Results   Component Value Date    MG 1.6 02/17/2021     Phosphorus:    Lab Results   Component Value Date    PHOS 9.2 02/18/2021     Radiology Review:      Chest x-ray February 15, 2021   1. ET tube in satisfactory position.  No evidence of acute intrathoracic   disease. 2. NG tube tip in the distal stomach. 3. Nonspecific small bowel distension that could be related to ileus or   small-bowel obstruction. 4. Suspected ascites.          BRIEF SUMMARY OF INITIAL CONSULT:

## 2021-02-18 NOTE — PROGRESS NOTES
CCPD complete. Disconnected and staysafe cap secured using aseptic technique. Effluent clear yellow. Report given

## 2021-02-18 NOTE — PROGRESS NOTES
Called Dr Mckenna Hernandes to inform him of patients low blood sugar,1amp of D50 given. Pt still lethargic, unable to take anything orally at this time.

## 2021-02-19 ENCOUNTER — APPOINTMENT (OUTPATIENT)
Dept: GENERAL RADIOLOGY | Age: 31
DRG: 710 | End: 2021-02-19
Payer: COMMERCIAL

## 2021-02-19 PROBLEM — J15.9 BACTERIAL PNEUMONIA: Status: ACTIVE | Noted: 2021-02-19

## 2021-02-19 LAB
ABO/RH: NORMAL
ALBUMIN SERPL-MCNC: 2.8 G/DL (ref 3.5–5.2)
ALP BLD-CCNC: 215 U/L (ref 35–104)
ALT SERPL-CCNC: 118 U/L (ref 0–32)
ANION GAP SERPL CALCULATED.3IONS-SCNC: 17 MMOL/L (ref 7–16)
ANTIBODY SCREEN: NORMAL
AST SERPL-CCNC: 39 U/L (ref 0–31)
B.E.: -4.7 MMOL/L (ref -3–3)
BILIRUB SERPL-MCNC: 0.3 MG/DL (ref 0–1.2)
BLOOD BANK DISPENSE STATUS: NORMAL
BLOOD BANK PRODUCT CODE: NORMAL
BPU ID: NORMAL
BUN BLDV-MCNC: 47 MG/DL (ref 6–20)
CALCIUM SERPL-MCNC: 7.7 MG/DL (ref 8.6–10.2)
CHLORIDE BLD-SCNC: 100 MMOL/L (ref 98–107)
CO2: 23 MMOL/L (ref 22–29)
COHB: 1 % (ref 0–1.5)
CREAT SERPL-MCNC: 8 MG/DL (ref 0.5–1)
CRITICAL: ABNORMAL
DATE ANALYZED: ABNORMAL
DATE OF COLLECTION: ABNORMAL
DESCRIPTION BLOOD BANK: NORMAL
FIO2: 100 %
GFR AFRICAN AMERICAN: 7
GFR NON-AFRICAN AMERICAN: 7 ML/MIN/1.73
GLUCOSE BLD-MCNC: 150 MG/DL (ref 74–99)
HCO3: 20 MMOL/L (ref 22–26)
HCT VFR BLD CALC: 17.9 % (ref 34–48)
HCT VFR BLD CALC: 23.8 % (ref 34–48)
HCT VFR BLD CALC: 23.9 % (ref 34–48)
HEMOGLOBIN: 5.8 G/DL (ref 11.5–15.5)
HEMOGLOBIN: 7.9 G/DL (ref 11.5–15.5)
HEMOGLOBIN: 7.9 G/DL (ref 11.5–15.5)
HHB: 4.4 % (ref 0–5)
LAB: ABNORMAL
Lab: ABNORMAL
MCH RBC QN AUTO: 29.1 PG (ref 26–35)
MCHC RBC AUTO-ENTMCNC: 32.4 % (ref 32–34.5)
MCV RBC AUTO: 89.9 FL (ref 80–99.9)
METER GLUCOSE: 105 MG/DL (ref 74–99)
METER GLUCOSE: 112 MG/DL (ref 74–99)
METER GLUCOSE: 118 MG/DL (ref 74–99)
METER GLUCOSE: 126 MG/DL (ref 74–99)
METER GLUCOSE: 191 MG/DL (ref 74–99)
METER GLUCOSE: 203 MG/DL (ref 74–99)
METER GLUCOSE: 228 MG/DL (ref 74–99)
METER GLUCOSE: 243 MG/DL (ref 74–99)
METER GLUCOSE: <40 MG/DL (ref 74–99)
METER GLUCOSE: <40 MG/DL (ref 74–99)
METHB: 0.1 % (ref 0–1.5)
MODE: ABNORMAL
O2 CONTENT: 8.9 ML/DL
O2 SATURATION: 95.6 % (ref 92–98.5)
O2HB: 94.5 % (ref 94–97)
OPERATOR ID: 274
PATIENT TEMP: 37
PCO2: 35.1 MMHG (ref 35–45)
PDW BLD-RTO: 18 FL (ref 11.5–15)
PFO2: 0.88 MMHG/%
PH BLOOD GAS: 7.37 (ref 7.35–7.45)
PHOSPHORUS: 9.4 MG/DL (ref 2.5–4.5)
PLATELET # BLD: 198 E9/L (ref 130–450)
PMV BLD AUTO: 11.2 FL (ref 7–12)
PO2: 87.8 MMHG (ref 75–100)
POTASSIUM SERPL-SCNC: 3.9 MMOL/L (ref 3.5–5)
RBC # BLD: 1.99 E12/L (ref 3.5–5.5)
SODIUM BLD-SCNC: 140 MMOL/L (ref 132–146)
SOURCE, BLOOD GAS: ABNORMAL
THB: 6.6 G/DL (ref 11.5–16.5)
TIME ANALYZED: 1005
TOTAL PROTEIN: 5.4 G/DL (ref 6.4–8.3)
WBC # BLD: 17.5 E9/L (ref 4.5–11.5)

## 2021-02-19 PROCEDURE — 84100 ASSAY OF PHOSPHORUS: CPT

## 2021-02-19 PROCEDURE — 6360000002 HC RX W HCPCS: Performed by: INTERNAL MEDICINE

## 2021-02-19 PROCEDURE — 6370000000 HC RX 637 (ALT 250 FOR IP): Performed by: INTERNAL MEDICINE

## 2021-02-19 PROCEDURE — 90945 DIALYSIS ONE EVALUATION: CPT

## 2021-02-19 PROCEDURE — 94640 AIRWAY INHALATION TREATMENT: CPT

## 2021-02-19 PROCEDURE — 86900 BLOOD TYPING SEROLOGIC ABO: CPT

## 2021-02-19 PROCEDURE — 82962 GLUCOSE BLOOD TEST: CPT

## 2021-02-19 PROCEDURE — 99232 SBSQ HOSP IP/OBS MODERATE 35: CPT | Performed by: INTERNAL MEDICINE

## 2021-02-19 PROCEDURE — 86901 BLOOD TYPING SEROLOGIC RH(D): CPT

## 2021-02-19 PROCEDURE — P9016 RBC LEUKOCYTES REDUCED: HCPCS

## 2021-02-19 PROCEDURE — 80053 COMPREHEN METABOLIC PANEL: CPT

## 2021-02-19 PROCEDURE — 85027 COMPLETE CBC AUTOMATED: CPT

## 2021-02-19 PROCEDURE — 71045 X-RAY EXAM CHEST 1 VIEW: CPT

## 2021-02-19 PROCEDURE — 36415 COLL VENOUS BLD VENIPUNCTURE: CPT

## 2021-02-19 PROCEDURE — 2580000003 HC RX 258: Performed by: INTERNAL MEDICINE

## 2021-02-19 PROCEDURE — 86850 RBC ANTIBODY SCREEN: CPT

## 2021-02-19 PROCEDURE — 94660 CPAP INITIATION&MGMT: CPT

## 2021-02-19 PROCEDURE — 2000000000 HC ICU R&B

## 2021-02-19 PROCEDURE — P9047 ALBUMIN (HUMAN), 25%, 50ML: HCPCS | Performed by: INTERNAL MEDICINE

## 2021-02-19 PROCEDURE — 36430 TRANSFUSION BLD/BLD COMPNT: CPT

## 2021-02-19 PROCEDURE — 97165 OT EVAL LOW COMPLEX 30 MIN: CPT

## 2021-02-19 PROCEDURE — 2700000000 HC OXYGEN THERAPY PER DAY

## 2021-02-19 PROCEDURE — 85018 HEMOGLOBIN: CPT

## 2021-02-19 PROCEDURE — C9113 INJ PANTOPRAZOLE SODIUM, VIA: HCPCS | Performed by: INTERNAL MEDICINE

## 2021-02-19 PROCEDURE — 97530 THERAPEUTIC ACTIVITIES: CPT

## 2021-02-19 PROCEDURE — 85014 HEMATOCRIT: CPT

## 2021-02-19 PROCEDURE — 82805 BLOOD GASES W/O2 SATURATION: CPT

## 2021-02-19 PROCEDURE — 86923 COMPATIBILITY TEST ELECTRIC: CPT

## 2021-02-19 RX ORDER — INSULIN GLARGINE 100 [IU]/ML
4 INJECTION, SOLUTION SUBCUTANEOUS NIGHTLY
Status: DISCONTINUED | OUTPATIENT
Start: 2021-02-20 | End: 2021-02-20

## 2021-02-19 RX ORDER — CALCIUM ACETATE 667 MG/1
1334 CAPSULE ORAL
Status: DISCONTINUED | OUTPATIENT
Start: 2021-02-20 | End: 2021-02-27 | Stop reason: HOSPADM

## 2021-02-19 RX ORDER — SODIUM CHLORIDE 9 MG/ML
INJECTION, SOLUTION INTRAVENOUS PRN
Status: DISCONTINUED | OUTPATIENT
Start: 2021-02-19 | End: 2021-02-27 | Stop reason: HOSPADM

## 2021-02-19 RX ADMIN — SODIUM CHLORIDE, PRESERVATIVE FREE 10 ML: 5 INJECTION INTRAVENOUS at 08:40

## 2021-02-19 RX ADMIN — IPRATROPIUM BROMIDE AND ALBUTEROL SULFATE 1 AMPULE: .5; 3 SOLUTION RESPIRATORY (INHALATION) at 06:36

## 2021-02-19 RX ADMIN — LEVOTHYROXINE SODIUM 75 MCG: 75 TABLET ORAL at 08:40

## 2021-02-19 RX ADMIN — IPRATROPIUM BROMIDE AND ALBUTEROL SULFATE 1 AMPULE: .5; 3 SOLUTION RESPIRATORY (INHALATION) at 18:50

## 2021-02-19 RX ADMIN — INSULIN LISPRO 4 UNITS: 100 INJECTION, SOLUTION INTRAVENOUS; SUBCUTANEOUS at 11:47

## 2021-02-19 RX ADMIN — MIDODRINE HYDROCHLORIDE 10 MG: 5 TABLET ORAL at 08:40

## 2021-02-19 RX ADMIN — ACETAMINOPHEN 650 MG: 325 TABLET, FILM COATED ORAL at 08:39

## 2021-02-19 RX ADMIN — IPRATROPIUM BROMIDE AND ALBUTEROL SULFATE 1 AMPULE: .5; 3 SOLUTION RESPIRATORY (INHALATION) at 23:36

## 2021-02-19 RX ADMIN — VANCOMYCIN HYDROCHLORIDE 1000 MG: 1 INJECTION, POWDER, LYOPHILIZED, FOR SOLUTION INTRAVENOUS at 10:42

## 2021-02-19 RX ADMIN — HEPARIN SODIUM 5000 UNITS: 5000 INJECTION INTRAVENOUS; SUBCUTANEOUS at 05:30

## 2021-02-19 RX ADMIN — MEROPENEM 500 MG: 500 INJECTION, POWDER, FOR SOLUTION INTRAVENOUS at 11:44

## 2021-02-19 RX ADMIN — HEPARIN SODIUM 5000 UNITS: 5000 INJECTION INTRAVENOUS; SUBCUTANEOUS at 14:23

## 2021-02-19 RX ADMIN — Medication 10 ML: at 21:26

## 2021-02-19 RX ADMIN — INSULIN LISPRO 4 UNITS: 100 INJECTION, SOLUTION INTRAVENOUS; SUBCUTANEOUS at 21:22

## 2021-02-19 RX ADMIN — PANTOPRAZOLE SODIUM 40 MG: 40 INJECTION, POWDER, FOR SOLUTION INTRAVENOUS at 08:40

## 2021-02-19 RX ADMIN — MIDODRINE HYDROCHLORIDE 10 MG: 5 TABLET ORAL at 11:44

## 2021-02-19 RX ADMIN — EPOETIN ALFA-EPBX 2000 UNITS: 2000 INJECTION, SOLUTION INTRAVENOUS; SUBCUTANEOUS at 10:36

## 2021-02-19 RX ADMIN — IPRATROPIUM BROMIDE AND ALBUTEROL SULFATE 1 AMPULE: .5; 3 SOLUTION RESPIRATORY (INHALATION) at 11:10

## 2021-02-19 RX ADMIN — OXYCODONE AND ACETAMINOPHEN 1 TABLET: 5; 325 TABLET ORAL at 11:19

## 2021-02-19 RX ADMIN — INSULIN GLARGINE 4 UNITS: 100 INJECTION, SOLUTION SUBCUTANEOUS at 08:42

## 2021-02-19 RX ADMIN — HEPARIN SODIUM 5000 UNITS: 5000 INJECTION INTRAVENOUS; SUBCUTANEOUS at 21:23

## 2021-02-19 RX ADMIN — EPOETIN ALFA-EPBX 3000 UNITS: 3000 INJECTION, SOLUTION INTRAVENOUS; SUBCUTANEOUS at 10:36

## 2021-02-19 RX ADMIN — ALBUMIN (HUMAN) 25 G: 0.25 INJECTION, SOLUTION INTRAVENOUS at 02:44

## 2021-02-19 RX ADMIN — DEXTROSE MONOHYDRATE 12.5 G: 500 INJECTION PARENTERAL at 17:35

## 2021-02-19 ASSESSMENT — PAIN DESCRIPTION - LOCATION: LOCATION: GENERALIZED

## 2021-02-19 ASSESSMENT — PAIN DESCRIPTION - PAIN TYPE: TYPE: ACUTE PAIN

## 2021-02-19 ASSESSMENT — PAIN SCALES - GENERAL
PAINLEVEL_OUTOF10: 3
PAINLEVEL_OUTOF10: 10

## 2021-02-19 ASSESSMENT — PAIN DESCRIPTION - FREQUENCY: FREQUENCY: CONTINUOUS

## 2021-02-19 ASSESSMENT — PAIN DESCRIPTION - DESCRIPTORS: DESCRIPTORS: ACHING

## 2021-02-19 ASSESSMENT — PAIN DESCRIPTION - ONSET: ONSET: ON-GOING

## 2021-02-19 NOTE — SIGNIFICANT EVENT
CTSP due to hypoxia and AMS. When seen she is minimally responsive to sternal rub but is able to protect her airway. Temp 99.9, reportedly had a fever earlier. On non rebreather when seen, pulse ox improved to 90s. ABG obtained, shows no significant CO2 retention. Check CXR.  Discussed with Dr. Wang Herring    cctime 31 mins  [facility] 10:36 PM 02/18/21

## 2021-02-19 NOTE — PROGRESS NOTES
Comprehensive Nutrition Assessment    Type and Reason for Visit:  Initial, RD Nutrition Re-Screen/LOS    Nutrition Recommendations/Plan: Continue with diet and start ONS BID: [place]    Nutrition Assessment:  Pt remains at nutritional risk d/t h/o ESRD on PD. Pt admitted w/ Cardiac Arrest, Pt w/ complex medical hx : Type 1 DM and CKD progression. Will start ONS BID and monitor need for further nutritional interventions. Malnutrition Assessment:  Malnutrition Status: At risk for malnutrition (Comment)    Context:  Chronic Illness     Findings of the 6 clinical characteristics of malnutrition:  Energy Intake:  Unable to assess  Weight Loss:  No significant weight loss     Body Fat Loss:  No significant body fat loss     Muscle Mass Loss:  No significant muscle mass loss    Fluid Accumulation:  No significant fluid accumulation     Strength:  Not Performed    Estimated Daily Nutrient Needs:  Energy (kcal):  1900-2000kcal/d(y54-16gxpa/kg); Weight Used for Energy Requirements:  Current     Protein (g):  75-85gm pro/d(x1.3-1.5gm/kg); Weight Used for Protein Requirements:  Ideal        Fluid (ml/day):   ; Method Used for Fluid Requirements:  Standard Renal      Nutrition Related Findings:  A/ox4, Hyperglycemia, Renal labs elevated, +BS, Abd soft w/ PD cath, Trace BUE/BLE edema, I/O WNL, Pt receiving avg. 306kcal/d from [facility]      Wounds:  None       Current Nutrition Therapies:    DIET RENAL; Carb Control: 4 carb choices (60 gms)/meal  Dietary Nutrition Supplements: Renal Oral Supplement    Anthropometric Measures:  · Height: 5' 4\" (162.6 cm)  · Current Body Weight: 161 lb (73 kg)(2/19)   · Admission Body Weight: 173 lb (78.5 kg)(2/15 bed scale)    · Usual Body Weight: 148 lb (67.1 kg)(12/26/20 bed scale weight.  Per other EMR hx wt ranges 133-149# w/ no method)     · Ideal Body Weight: 120 lbs; % Ideal Body Weight 134.2 %   · BMI: 27.6  · Adjusted Body Weight:  ; No Adjustment · BMI Categories: Overweight (BMI 25.0-29. 9)       Nutrition Diagnosis:   · Increased nutrient needs related to renal dysfunction as evidenced by dialysis      Nutrition Interventions:   Food and/or Nutrient Delivery:  Continue Current Diet, Start Oral Nutrition Supplement  Nutrition Education/Counseling:  No recommendation at this time   Coordination of Nutrition Care:  Continue to monitor while inpatient    Goals:  Pt to consume >50-75% most meals/ONS       Nutrition Monitoring and Evaluation:   Behavioral-Environmental Outcomes:  Readiness for Change   Food/Nutrient Intake Outcomes:  Food and Nutrient Intake, Supplement Intake  Physical Signs/Symptoms Outcomes:  Biochemical Data, Fluid Status or Edema, Hemodynamic Status, Nutrition Focused Physical Findings, Skin, Weight     Discharge Planning:     Too soon to determine     Electronically signed by Ganga Bass RD, LD on 2/19/21 at 11:03 AM EST    Contact: [phone]

## 2021-02-19 NOTE — PROGRESS NOTES
 Valvular endocarditis    Hyperosmolar hyperglycemic state (HHS) ([address]    Seizure ([address]    Hypothermia    Hyperkalemia    Diabetic polyneuropathy associated with type 1 diabetes mellitus ([address]    Cardiac arrest ([address]    Shock liver    Bacterial pneumonia        Tentative placement recommendation: Subacute rehab    Equipment recommendation:  To be determined      Prior Level of Function: Patient ambulated independently    Rehab Potential: fair    for baseline    Past medical history:   Past Medical History:   Diagnosis Date    Acute congestive heart failure ([address]     BC (acute kidney injury) ([address] 10/1/2019    Cephalgia 10/9/2019    Chronic kidney disease     Depression     Diabetes mellitus ([address]     Diabetic ketoacidosis ([address] 2011    [facility]     History of blood transfusion 2019    Hyperlipidemia 10/8/2020    Hypothyroidism 10/8/2020    Iron deficiency anemia 10/1/2019    MDRO (multiple drug resistant organisms) resistance     MRSA (methicillin resistant Staphylococcus aureus)     back wound abcess    Non compliance w medication regimen 3/30/2016    Other disorders of kidney and ureter     Pregnancy 3/30/2016    16 weeks    Seizure ([address] 2020    Severe pre-eclampsia in third trimester 2016    Shock liver 2/15/2021     Past Surgical History:   Procedure Laterality Date    BACK SURGERY      abscess     SECTION      x2    CHOLECYSTECTOMY, LAPAROSCOPIC N/A 2019    CHOLECYSTECTOMY LAPAROSCOPIC performed by Genaro Hubbard MD at [address] [facility] N/A 2018    COLONOSCOPY WITH BIOPSY performed by Parris Blair MD at [facility] N/A 2018    COLONOSCOPY WITH BIOPSY performed by Ilya Taylor MD at [id number] [place] [address] ECHO COMPL [address]  2012    EF 57%    ECHO COMPL W DOP COLOR FLOW  6/10/2013         EMBOLECTOMY N/A 2020 [facility], [address] [facility], [place] -- REQS ROOM 3 performed by Prieto Baltazar MD at [address] [facility] N/A 6/26/2020    LAPAROSCOPIC INSERTION PERITONEAL DIALYSIS CATHETER performed by Maryuri Gupta MD at [facility] ESOPHAGOGASTRODUODENOSCOPY TRANSORAL DIAGNOSTIC N/A 5/30/2018    EGD ESOPHAGOGASTRODUODENOSCOPY performed by Leah Tristan MD at [address] [facility] TRANSESOPHAGEAL ECHOCARDIOGRAM N/A 10/19/2020    TRANSESOPHAGEAL ECHOCARDIOGRAM WITH BUBBLE STUDY performed by Angela Marrero MD at [address] [facility] [address] [address]  04/2018    UPPER GASTROINTESTINAL ENDOSCOPY  12/18/2018    EGD BIOPSY performed by Teddy Schaumann, MD at [address] [facility] N/A 10/11/2019    EGD ESOPHAGOGASTRODUODENOSCOPY performed by Eugene Bhatt DO at [facility] ENDOSCOPY       Precautions: Use lift equipment for lifting patient , falls, alarm, O2 and lethargy\ ,  end-stage renal disease on peritoneal dialysis,    SUBJECTIVE:    Social history: Patient lives with mom and her 2 children   in a [place] home  with 8 steps  to enter with [address] shower     Equipment owned: None,       [address] [facility] Mobility Inpatient   How much difficulty turning over in bed?: A Lot  How much difficulty sitting down on / standing up from a chair with arms?: A Lot  How much difficulty moving from lying on back to sitting on side of bed?: A Lot  How much help from another person moving to and from a bed to a chair?: A Lot  How much help from another person needed to walk in hospital room?: A Lot  How much help from another person for climbing 3-5 steps with a railing?: A Lot  AM-PAC Inpatient Mobility Raw Score : 12  AM-PAC Inpatient T-Scale Score : 35.33  Mobility Inpatient CMS 0-100% Score: 68.66  Mobility Inpatient CMS G-Code Modifier : CL    Nursing cleared patient for PT treatment.     OBJECTIVE;   Initial Evaluation  Date: 2/18/2021 Treatment Date: 2/19/2021     Short Term/ Long Term   Goals   Was pt agreeable to Eval/treatment? Yes   yes To be met in 5 days   Pain level   Unable to rate c/o back pain    9/10 all over    Bed Mobility    Rolling: Moderate assist of 1    Supine to sit: Maximal assist of 1    Sit to supine: Maximal assist of 1    Scooting: Maximal assist of 1   Rolling: Moderate assist of  2   Supine to sit: Moderate assist of  2   Sit to supine: Moderate assist of  2   Scooting: Moderate assist of  2    Rolling: Minimal assist of 1    Supine to sit: Minimal assist of 1    Sit to supine: Minimal assist of 1    Scooting: Minimal assist of 1     Transfers Sit to stand: Not assessed  too lethargic Sit to stand: Minimal assist of 2 HHA2     Sit to stand:  Moderate assist of 1     Ambulation    not assessed   3  side steps using  hand held assist with Minimal assist of 2   and verbal cues    10 feet using  wheeled walker with Moderate assist of 1    Stair negotiation: ascended and descended   Not assessed            ROM Within functional limits        Strength BUE:  3-/5  RLE:  3-/5  LLE:  3-/5   Increase strength in affected mm groups by 1/3 grade   Balance Sitting EOB:  poor moderate assist throughout right lateral lean maximum cueing for upright  Dynamic Standing:  not assessed   Sitting EOB: fair Mod A progressing to Min A  Dynamic Standing: fair    Sitting EOB:  fair +  Dynamic Standing: fair wheeled walker      Patient is Alert & Oriented x person and follows one step directions 50%  Sensation:  Patient  denies numbness and tingling     Edema:  yes bilateral lower extremities and bilateral upper extremities slight  Endurance: poor      Vitals: 12 liters high flow nasal cannula    Blood Pressure at rest  Blood Pressure during session    Heart Rate at rest 113 Heart Rate during session     SPO2 at rest 89-90%  SPO2 during session 85-90%     Patient education Patient educated on role of Physical Therapy, risks of immobility, safety and plan of care,  importance of mobility while in hospital , ankle pumps, quad set and glut set for edema control, blood clot prevention and positioning for skin integrity and comfort      Patient response to education:   Pt verbalized understanding Pt demonstrated skill Pt requires further education in this area   Yes Partial Yes      Treatment:  Patient practiced and was instructed/facilitated in the following treatment: Patient assisted to Edge of bed  Sat edge of bed 20 minutes with Moderate assist of 1 to increase dynamic sitting balance and activity tolerance. Performed LE exercises. Cues for sitting balance. OT present for grooming. Returned to bed, rolling for bed linen adjustment. Therapeutic Exercises:  ankle pumps and long arc quad  x 15 reps. At end of session, patient in bed with alarm call light and phone within reach,   all lines and tubes intact, nursing notified. Patient would benefit from continued skilled Physical Therapy to improve functional independence and quality of life. Patient's/ family goals   none stated        ASSESSMENT: Patient exhibits decreased strength, balance, endurance and lethargy impairing functional mobility, transfers, gait , tolerance to activity and participation Alert and able to participate although c/o pain and moaning off and on .     Plan of Care:     -Bed Mobility: Lower extremity exercises  and Upper extremity exercises   -Sitting Balance: Hands on support to maintain midline , Facilitate active trunk muscle engagement  and Facilitate postural control in all planes   -Standing Balance: Perform strengthening exercises in standing to promote motor control with or without upper extremity support  and Instruct patient on adequate base of support to maintain balance -Transfers: Provide instruction on proper hand and foot position for adequate transfer of weight onto lower extremities and use of gait device, Facilitate weight shift forward on to lower extremities and provide necessary stabilization of bilateral lower extremities  and Provide stabilization to prevent fall   -Gait: Gait training and Standing activities to improve: base of support, weight shift, weight bearing    -Endurance: Utilize Supervised activities to increase level of endurance to allow for safe functional mobility including transfers and gait  and Use graduated activities to promote good breathing techniques and provide support and education to maximize respiratory function    Patient and or family understand(s) diagnosis, prognosis, and plan of care. Frequency of treatments: Patient will be seen  daily.        Time in  1357  Time out  1423    Total Treatment Time  26 minutes  CPT codes:    Therapeutic activities (61215)   13 minutes  1 unit(s)  Non billable time 13 minutes    Roberto Carlos Petty PTA [facility]#[id number]

## 2021-02-19 NOTE — PROGRESS NOTES
Pharmacy Consultation Note  (Antibiotic Dosing and Monitoring)    Initial consult date: 2/15/2021  Consulting physician: Dr. Shikha Gutierrez  Drug(s): Vancomycin  Indication: Sepsis    Ht Readings from Last 1 Encounters:   02/15/21 5' 4\" (1.626 m)     Wt Readings from Last 1 Encounters:   02/19/21 161 lb 6 oz (73.2 kg)     Age/  Gender IBW DW  Allergy Information   31 y.o.  female 54.7 kg 64.3 kg  Cefepime and Toradol [ketorolac tromethamine]         Date  Tmax WBC Dialysis Drug/Dose Time   Given Level(s)   (Time) Comments   2/15  (#1) -- 8.1 PD Vancomycin 1500 mg IV x 1 1745     2/16  (#2) 99.4 14.2 PD No vancomycin -- Random level @ 0845 = 27.1 mcg/mL    2/17  (#3) 102 20 PD No vancomycin --     2/18  (#4) 102.9 20.4 PD No vancomycin -- Random level @ 0522 = 19.8 mcg/mL    2/19  (#5) 99.9 17.5 PD Vancomycin 1000 mg IV x 1 <1030>       (#6)            (#7)            Estimated Creatinine Clearance: 10 mL/min (A) (based on SCr of 8 mg/dL (H)). UOP over the past 24 hours:       Intake/Output Summary (Last 24 hours) at 2/19/2021 0931  Last data filed at 2/19/2021 0730  Gross per 24 hour   Intake 200 ml   Output 2181 ml   Net -1981 ml       Anti-infective Regimen:  Anti-infective Dose Date Initiated Date Stopped   Ceftriaxone 1000 mg IV q24hr 2/15 2/17   Pip/tazo 3.375g IV q12hr 2/17 2/18   Meropenem 500 mg q24hr 2/18      Cultures:  available culture and sensitivity results were reviewed in EPIC  Cultures sent and are pending. Culture Date Result    Blood cx 1 2/15 NGTD   Blood cx 2 2/15 NGTD   Respiratory cx 2/18 Few GP diplococci  Rare GNR               Assessment:  · Consulted by Dr. Shikha Gutierrez to dose/monitor vancomycin  · Goal trough level:  15-20 mcg/mL, AUC/DEENA:   400-600  · Pt is a 31 yoF who presented from home in PEA arrest. Patient has hx of ESRD on peritoneal dialysis, diabetes, in DKA this admission, and multiple hospitalizations. Empiric antibiotics initiated for sepsis. · Hx ESRD, HD on peritoneal dialysis. · 2/16: Random level = 27.1 mcg/mL  · 2/18: Random level = 19.8 mcg/mL    Plan:  · Re-dose vancomycin 1000 mg IV x 1 dose today  · Follow dialysis schedule for further dosing and levels  · Pharmacist will follow and monitor/adjust dosing as necessary      Thank you for the consult,    Sena Elizabeth, PharmD, BCPS 2/19/2021 9:31 AM   Ext: [phone]

## 2021-02-19 NOTE — PLAN OF CARE
Problem: Increased nutrient needs (NI-5.1)  Goal: Food and/or Nutrient Delivery  Description: ONS BID  Outcome: Met This Shift

## 2021-02-19 NOTE — PROGRESS NOTES
[address] [place], [place] [address]ist   Progress Note    Admitting Date and Time: 2/15/2021  8:07 AM  Admit Dx: Cardiac arrest [facility]) [I46.9]    Subjective/interval history:    2/16: Patient was admitted yesterday afternoon with PEA cardiac arrest, sepsis due to urinary tract infection, hyperkalemia in setting of end-stage renal disease on peritoneal dialysis. This morning she is intubated, but awake, alert, appears anxious. 2/17: Patient was extubated yesterday afternoon. She is awake, alert, oriented x3, neurologically intact. Very anxious appearing. States she has upper and lower back pain. 2/18: Patient awake, alert, somewhat somnolent, awakens easily to voice, but appears anxious when awake. She has pain all over and has generalized weakness. 2/19: Overnight, patient was seen for altered mental status and hypoxia. She was minimally responsive to sternal rub, but was protecting her airway. She was on nonrebreather mask, now on heated high flow nasal cannula oxygen. ABG did not show any significant hypercapnia. This morning, she is awake, alert, but complaining of pain all over. She is very tearful. I discussed with her that given her respiratory failure, and recent cardiac arrest, escalating pain medications would cause potential complications clean respiratory depression. ROS: Complete review of systems negative other than mentioned above.      [START ON 2/20/2021] insulin glargine  4 Units Subcutaneous Nightly    vancomycin  1,000 mg Intravenous Once    meropenem  500 mg Intravenous Q24H    midodrine  10 mg Oral TID WC    insulin lispro  0-12 Units Subcutaneous 4x Daily AC & HS    pantoprazole  40 mg Intravenous Daily    And    sodium chloride (PF)  10 mL Intravenous Daily    ipratropium-albuterol  1 ampule Inhalation Q6H    epoetin nena-epbx  3,000 Units Subcutaneous Once per day on Mon Wed Fri    And  epoetin nena-epbx  2,000 Units Subcutaneous Once per day on Mon Wed Fri    levothyroxine  75 mcg Per NG tube QAM AC    sodium chloride flush  10 mL Intravenous 2 times per day    heparin (porcine)  5,000 Units Subcutaneous 3 times per day    vancomycin (VANCOCIN) intermittent dosing (placeholder)   Other RX Placeholder         sodium chloride, , PRN      oxyCODONE-acetaminophen, 1 tablet, Q6H PRN      potassium chloride, 20 mEq, PRN      glucose, 15 g, PRN      dextrose, 12.5 g, PRN      glucagon (rDNA), 1 mg, PRN      dextrose, 100 mL/hr, PRN      sodium chloride flush, 10 mL, PRN      polyethylene glycol, 17 g, Daily PRN      acetaminophen, 650 mg, Q6H PRN    Or      acetaminophen, 650 mg, Q6H PRN         Objective:    /75   Pulse 120   Temp 98.2 °F (36.8 °C) (Oral)   Resp 18   Ht 5' 4\" (1.626 m)   Wt 161 lb 6 oz (73.2 kg)   SpO2 97%   BMI 27.70 kg/m²   General Appearance: Alert and oriented x3, appears anxious and tearful  Skin: warm and dry  Head: normocephalic and atraumatic  Eyes: pupils equal, round, and reactive to light, extraocular eye movements intact, conjunctivae normal  Neck: neck supple and non tender without mass   Pulmonary/Chest: Nonlabored. Mild rhonchi on the right, slightly diminished on the left.   Cardiovascular: normal rate, normal S1 and S2 and no carotid bruits  Abdomen: soft, non-distended, normal bowel sounds, no masses or organomegaly  Extremities: no cyanosis, no clubbing and no edema  Neurologic: Cranial nerves II through XII grossly intact, speech normal      Recent Labs     02/17/21 0448 02/17/21 2118 02/18/21 0522 02/19/21  0521     --  137 140   K 4.2  --  4.4 3.9   CL 97*  --  99 100   CO2 23  --  22 23   BUN 48*  --  49* 47*   CREATININE 7.9*  --  8.4* 8.0*   GLUCOSE 104* 179* 149* 150*   CALCIUM 7.5*  --  7.8* 7.7*       Recent Labs     02/17/21 0448 02/18/21  0522 02/19/21  0521   ALKPHOS 167* 202* 215*   PROT 4.5* 5.3* 5.4* LABALBU 2.1* 2.1* 2.8*   BILITOT 0.2 0.3 0.3   * 74* 39*   * 221* 118*       Recent Labs     02/17/21  0448 02/18/21  0522 02/19/21  0521   WBC 20.0* 20.4* 17.5*   RBC 2.38* 2.39* 1.99*   HGB 7.0* 7.1* 5.8*   HCT 21.3* 21.2* 17.9*   MCV 89.5 88.7 89.9   MCH 29.4 29.7 29.1   MCHC 32.9 33.5 32.4   RDW 17.6* 17.2* 18.0*    222 198   MPV 10.0 10.7 11.2       CBC:   Lab Results   Component Value Date    WBC 17.5 02/19/2021    RBC 1.99 02/19/2021    HGB 5.8 02/19/2021    HCT 17.9 02/19/2021    MCV 89.9 02/19/2021    MCH 29.1 02/19/2021    MCHC 32.4 02/19/2021    RDW 18.0 02/19/2021     02/19/2021    MPV 11.2 02/19/2021     CMP:    Lab Results   Component Value Date     02/19/2021    K 3.9 02/19/2021    K 5.4 02/15/2021     02/19/2021    CO2 23 02/19/2021    BUN 47 02/19/2021    CREATININE 8.0 02/19/2021    GFRAA 7 02/19/2021    LABGLOM 7 02/19/2021    GLUCOSE 150 02/19/2021    GLUCOSE 130 05/18/2012    PROT 5.4 02/19/2021    LABALBU 2.8 02/19/2021    LABALBU 4.1 05/18/2012    CALCIUM 7.7 02/19/2021    BILITOT 0.3 02/19/2021    ALKPHOS 215 02/19/2021    AST 39 02/19/2021     02/19/2021        Radiology:   XR CHEST PORTABLE   Preliminary Result   1. No interval change in the multifocal bilateral diffuse hazy pulmonary   infiltrates. XR CHEST PORTABLE   Final Result   Persistent but perhaps slightly improved bilateral diffuse ill-defined   opacities left slightly greater than right. RECOMMENDATION:   Continued radiographic follow-up suggested. XR CHEST PORTABLE   Final Result   Improvement in right-sided opacities and worsening in left-sided airspace   opacities. Finding may represent pulmonary edema versus atelectasis. XR CHEST PORTABLE   Final Result   Extensive primarily right-sided pulmonary infiltrates new since the prior exam      CT HEAD WO CONTRAST   Final Result   No acute intracranial abnormality.       XR ABDOMEN FOR NG/OG/NE TUBE PLACEMENT -Blood cultures drawn on 2/15 and repeat cultures on 2/18 showing no growth to date. 5.  Hyperkalemia  -Resolved  -Continue peritoneal dialysis, nephrology following     6. Uncontrolled type 1 diabetes mellitus  -Basal and corrective scale insulin for now. Plan to resume insulin pump at discharge     7.  End-stage renal disease on peritoneal dialysis  -Nephrology following for dialysis management     8. Shock liver  -Transaminases continue to improve, bilirubin normal    9. Essential HTN  -Metoprolol tartrate resumed 2/17     Code Status: Full code  DVT prophylaxis: Subcutaneous heparin    NOTE: This report was transcribed using voice recognition software. Every effort was made to ensure accuracy; however, inadvertent computerized transcription errors may be present.      Electronically signed by Gumaro Feliciano DO on 2/19/2021 at 10:36 AM

## 2021-02-19 NOTE — PROGRESS NOTES
[facility] tx initiated without problem using aseptic technique. Cleaned exit site. Site benign. New dressing applied, dated, and secured. Initial drain and fill completed. Effluent clear, pale yellow. No fibrin noted. Pt has no c/o at this time. Report given to [facility], RN.  Pt stable

## 2021-02-19 NOTE — PROGRESS NOTES
Called Dr. Pepito Smalls to come evaluate the patient, O2 sat dropped into the 50's patient is unresponsive. Pt currently on 15L HFNC added nonrebreather, sats up in the low 90's. Dr. Pepito Smalls, coming to see patient.

## 2021-02-19 NOTE — FLOWSHEET NOTE
[facility] treatment ended at 0730.aseptic technique used to disconnect patient. Patient stable post treatment. Effluent clear yellow. Dressing changed last night. 2181ml net UF. No signs on infection at site.          02/19/21 0730   Vitals   Temp 99.9 °F (37.7 °C)   Pulse 116   Resp 20   SpO2 92 %   Weight 161 lb 6 oz (73.2 kg)   Cycler   Ultrafiltration (UF) (mL) 2181 mL

## 2021-02-19 NOTE — CARE COORDINATION
2/19/21 Negative covid 2/15/21. Cm transition of care: met with patient earlier this morning about future discharge planning needs and her readmissions. Pt in agreement that she may need to discharge to a SNF for physical and occupational therapies. Pt with significant event last evening- hypoxia and AMS. [place] requested CM call her mom to talk about SNF- [facility]ided her with information on [facility]- that can do capd. We discussed HHC, as well. Pt has 24/7 assistance at home- she is never left alone per her mom. CM cell number given to [facility] to call after she has time to review information provided and decide discharge direction of care planning. CM/SS following.  Electronically signed by Solitario Celestin RN-BC on 2/19/2021 at 10:34 AM

## 2021-02-19 NOTE — PROGRESS NOTES
OCCUPATIONAL THERAPY  Initial Evaluation  Date:2021  Patient Name: Martina Ortiz  MRN: [id number]  : 1990  ROOM #: IC10/IC10-01     Referring Provider: DO Indigo Perez OT: Odell Kennedy OTR/L [id number]    Placement Recommendation: Subacute    Recommended Adaptive Equipment: TBD at rehab     [facility]   AM-PAC Daily Activity Inpatient   How much help for putting on and taking off regular lower body clothing?: Total  How much help for Bathing?: A Lot  How much help for Toileting?: Total  How much help for putting on and taking off regular upper body clothing?: A Lot  How much help for taking care of personal grooming?: A Little  How much help for eating meals?: None  AM-PAC Inpatient Daily Activity Raw Score: 13  AM-PAC Inpatient ADL T-Scale Score : 32.03  ADL Inpatient CMS 0-100% Score: 63.03  ADL Inpatient CMS G-Code Modifier : CL    Based on patient's functional performance as stated below and their level of assistance needed prior to admission, this therapist believes that the patient would benefit from skilled Occupational Therapy following their hospital stay in an effort to increase safety and independence with completion of ADL/IADL tasks for functional independence and quality of life. Diagnosis:   1. Cardiac arrest ([address]    2. Small bowel obstruction ([address]    3. Acute cystitis with hematuria    4. Hypertensive emergency    5. Diabetic ketoacidosis with coma associated with other specified diabetes mellitus ([address]    6. Transaminitis    7. BC (acute kidney injury) ([address]    8.  Acute anemia      Pertinent Medical History:   Past Medical History:   Diagnosis Date    Acute congestive heart failure ([address]     BC (acute kidney injury) ([address] 10/1/2019    Cephalgia 10/9/2019    Chronic kidney disease     Depression     Diabetes mellitus ([address]     Diabetic ketoacidosis ([address] 2011    Hemodialysis patient ([address]     History of blood transfusion 2019  Hyperlipidemia 10/8/2020    Hypothyroidism 10/8/2020    Iron deficiency anemia 10/1/2019    MDRO (multiple drug resistant organisms) resistance     MRSA (methicillin resistant Staphylococcus aureus)     back wound abcess    Non compliance w medication regimen 3/30/2016    Other disorders of kidney and ureter     Pregnancy 3/30/2016    16 weeks    Seizure ([address] 11/20/2020    Severe pre-eclampsia in third trimester 8/22/2016    Shock liver 2/15/2021      Precautions:  Falls, heated high flow  Pain Scale: Numeric Rate: 10/10 pain \"all over\"; Nursing notified. Social history: alone: two children       Drive: yes    Occupation: not working   Home architecture: single family home, 1 story, 8 steps to enter with rail, tub shower. PLOF: independent with BADL and independent with IADL, pt ambulated with no device   Equipment owned: none   Cognition: A&O x 4; follows 3 step directions. good  Problem solving skills   good  Memory    good  Sequencing   good  Judgement/safety  Communication: intact   Visual perceptual skills: intact     Glasses: yes   Edema: yes, ankles     Sensation: intact   Hand Dominance:  Left     X Right     Left Right Comment   Passive range of motion [facility]     Active range of motion [facility]     Muscle Grade 4/5 4/5    /pinch Strength Intact  Intact       Functional Assessment:   Initial Evaluation Status Date:   2/19/2021 Treatment Status  Date: STGs = LTGs  Time frame: 5 - 14 days   Feeding Independent   Independent    Grooming Minimal Assist with set up to brush teeth/rinse and spit in emesis basin while seated at EOB  Minimal Assist to don deodorant. Minimal Assist to brush hair. Independent    UB Dressing Moderate Assist to doff and don hospital gown.    Independent    LB Dressing Dependent to don socks  Independent    Bathing Moderate Assist  Independent    Toileting Dependent for hygiene after using the bed pan   Independent Bed Mobility  Facilitated Supine to sit: Maximal Assist x 2   Scooting:Maximal Assist  Sit to supine: Maximal Assist x 2    Rolling: Maximal Assist for rolling to remove bed pan and for rolling in bed to straighten linens   Supine to sit: Independent   Scooting:Independent  Sit to supine: Independent   Rolling: Independent   Functional Transfers Moderate Assist x 2 from EOB with hand held assist x 2. Transfer training with verbal cues for hand placement throughout session to improve safety. Independent    Functional Mobility Moderate Assist x 2 with hand held assist to side step towards head of bed. Verbal cues for sequence and safety. Independent     Activity Tolerance Fair minus   good     Balance:   Sitting balance at EOB to increase dynamic sitting balance and activities tolerance with minimal assist progressing to close supervision     Standing fair minus with hand held assist x 2 to improve balance   Endurance: fair     Comments: Upon arrival to the room the patient was supine. At end of the session, the patient was supine with B LE elevated and R UE elevated on a pillow. Call light and phone within reach. Pt required verbal cues and instruction as noted above for safe facilitation and completion of tasks. Therapist provided skilled monitoring of the patient's response during treatment session. Prior to and at the end of session, environmental modifications/line management completed for patients safety and efficiency of treatment session. OT services provided to include instruction/training on safety and adapted techniques for completion of transfers and ADL's. Overall, the patient demonstrates difficulties with completion of BADL's and IADL's. Factors contributing to these difficulties includes decreased endurance and generalized weakness. Pt would benefit from continued skilled OT to increase independence with BADL's and IADL's.      Treatment: Bed mobility: Facilitated bed mobility with cues for proper body mechanics and sequencing to prepare for ADL completion. Functional transfers: Facilitated transfers from various surfaces with cues for body alignment, safety and hand placement. ADL completion: Self-care retraining for the above-mentioned ADLs; training on proper hand placement, safety technique, sequencing, and energy conservation techniques. Postural Balance: Sitting and standing balance retraining to improve righting reactions with postural changes during ADLs. Skilled positioning: Proper positioning to improve interaction with environment, overall functioning and decrease/prevent edema and contractures. Evaluation Complexity:   · Low Complexity  · History: Brief history including review of medical records relating to the problem  · Exam: 1-3 performance Deficits  · Assistance/Modification: No assistance or modifications required to perform tasks. No comorbities affecting occupational performance.     Assessment of current deficits:   Functional mobility [x]        ADLs [x]        Strength [x]      Cognition []  Functional transfers  [x]       IADLs [x]        Safety Awareness []      Endurance [x]  Fine Motor Coordination []       Balance [x]       Vision/perception []     Sensation []   Gross Motor Coordination []       ROM []         Delirium []                          Motor Control []    Plan of Care: OT 1-3x/week for 5-7 days PRN   Instruction/training on adapted ADL techniques and AE recommendations to increased functional independence with precautions   Functional transfer/mobility training/DME recommendations for increased independence, safety, and fall prevention    Therapeutic activity to facilitate/challenge dynamic balance, standing tolerance, fine motor dexterity/in-hand manipulation for increased independence with ALD's    Functional Mobility Training

## 2021-02-19 NOTE — PROGRESS NOTES
Attempted to notify Dr. Isaiah Liu of morning lab results, voicemail left for him to call back. Awaiting call.

## 2021-02-20 ENCOUNTER — APPOINTMENT (OUTPATIENT)
Dept: ULTRASOUND IMAGING | Age: 31
DRG: 710 | End: 2021-02-20
Payer: COMMERCIAL

## 2021-02-20 LAB
ALBUMIN SERPL-MCNC: 2.6 G/DL (ref 3.5–5.2)
ALP BLD-CCNC: 240 U/L (ref 35–104)
ALT SERPL-CCNC: 88 U/L (ref 0–32)
ANION GAP SERPL CALCULATED.3IONS-SCNC: 19 MMOL/L (ref 7–16)
AST SERPL-CCNC: 26 U/L (ref 0–31)
B.E.: -4.3 MMOL/L (ref -3–3)
BILIRUB SERPL-MCNC: 0.3 MG/DL (ref 0–1.2)
BLOOD CULTURE, ROUTINE: NORMAL
BUN BLDV-MCNC: 49 MG/DL (ref 6–20)
CALCIUM SERPL-MCNC: 8.1 MG/DL (ref 8.6–10.2)
CHLORIDE BLD-SCNC: 100 MMOL/L (ref 98–107)
CO2: 23 MMOL/L (ref 22–29)
COHB: 0.4 % (ref 0–1.5)
CREAT SERPL-MCNC: 8.2 MG/DL (ref 0.5–1)
CRITICAL: ABNORMAL
CULTURE, BLOOD 2: NORMAL
DATE ANALYZED: ABNORMAL
DATE OF COLLECTION: ABNORMAL
FIO2: 70 %
GFR AFRICAN AMERICAN: 7
GFR NON-AFRICAN AMERICAN: 7 ML/MIN/1.73
GLUCOSE BLD-MCNC: 130 MG/DL (ref 74–99)
HCO3: 20 MMOL/L (ref 22–26)
HCT VFR BLD CALC: 24.3 % (ref 34–48)
HEMOGLOBIN: 8.3 G/DL (ref 11.5–15.5)
HHB: 4.3 % (ref 0–5)
LAB: ABNORMAL
Lab: ABNORMAL
MCH RBC QN AUTO: 29.7 PG (ref 26–35)
MCHC RBC AUTO-ENTMCNC: 34.2 % (ref 32–34.5)
MCV RBC AUTO: 87.1 FL (ref 80–99.9)
METER GLUCOSE: 115 MG/DL (ref 74–99)
METER GLUCOSE: 124 MG/DL (ref 74–99)
METER GLUCOSE: 150 MG/DL (ref 74–99)
METER GLUCOSE: 258 MG/DL (ref 74–99)
METHB: 0.2 % (ref 0–1.5)
MODE: ABNORMAL
O2 CONTENT: 12.6 ML/DL
O2 SATURATION: 95.7 % (ref 92–98.5)
O2HB: 95.1 % (ref 94–97)
OPERATOR ID: 4001
PATIENT TEMP: 37 C
PCO2: 33.1 MMHG (ref 35–45)
PDW BLD-RTO: 16.1 FL (ref 11.5–15)
PFO2: 1.24 MMHG/%
PH BLOOD GAS: 7.4 (ref 7.35–7.45)
PHOSPHORUS: 10 MG/DL (ref 2.5–4.5)
PLATELET # BLD: 238 E9/L (ref 130–450)
PMV BLD AUTO: 10.7 FL (ref 7–12)
PO2: 86.8 MMHG (ref 75–100)
POTASSIUM SERPL-SCNC: 4.1 MMOL/L (ref 3.5–5)
RBC # BLD: 2.79 E12/L (ref 3.5–5.5)
RI(T): 4.14
SODIUM BLD-SCNC: 142 MMOL/L (ref 132–146)
SOURCE, BLOOD GAS: ABNORMAL
THB: 9.3 G/DL (ref 11.5–16.5)
TIME ANALYZED: 519
TOTAL PROTEIN: 5.7 G/DL (ref 6.4–8.3)
WBC # BLD: 17.3 E9/L (ref 4.5–11.5)

## 2021-02-20 PROCEDURE — 94640 AIRWAY INHALATION TREATMENT: CPT

## 2021-02-20 PROCEDURE — 99232 SBSQ HOSP IP/OBS MODERATE 35: CPT | Performed by: INTERNAL MEDICINE

## 2021-02-20 PROCEDURE — 2580000003 HC RX 258: Performed by: INTERNAL MEDICINE

## 2021-02-20 PROCEDURE — 82962 GLUCOSE BLOOD TEST: CPT

## 2021-02-20 PROCEDURE — 85027 COMPLETE CBC AUTOMATED: CPT

## 2021-02-20 PROCEDURE — 36415 COLL VENOUS BLD VENIPUNCTURE: CPT

## 2021-02-20 PROCEDURE — 93971 EXTREMITY STUDY: CPT

## 2021-02-20 PROCEDURE — 82805 BLOOD GASES W/O2 SATURATION: CPT

## 2021-02-20 PROCEDURE — 6370000000 HC RX 637 (ALT 250 FOR IP): Performed by: INTERNAL MEDICINE

## 2021-02-20 PROCEDURE — 2000000000 HC ICU R&B

## 2021-02-20 PROCEDURE — 6360000002 HC RX W HCPCS: Performed by: INTERNAL MEDICINE

## 2021-02-20 PROCEDURE — 2500000003 HC RX 250 WO HCPCS: Performed by: INTERNAL MEDICINE

## 2021-02-20 PROCEDURE — P9047 ALBUMIN (HUMAN), 25%, 50ML: HCPCS | Performed by: INTERNAL MEDICINE

## 2021-02-20 PROCEDURE — 2700000000 HC OXYGEN THERAPY PER DAY

## 2021-02-20 PROCEDURE — 84100 ASSAY OF PHOSPHORUS: CPT

## 2021-02-20 PROCEDURE — 80053 COMPREHEN METABOLIC PANEL: CPT

## 2021-02-20 RX ORDER — ALBUMIN (HUMAN) 12.5 G/50ML
25 SOLUTION INTRAVENOUS ONCE
Status: COMPLETED | OUTPATIENT
Start: 2021-02-20 | End: 2021-02-20

## 2021-02-20 RX ORDER — PANTOPRAZOLE SODIUM 40 MG/1
40 TABLET, DELAYED RELEASE ORAL
Status: DISCONTINUED | OUTPATIENT
Start: 2021-02-21 | End: 2021-02-27 | Stop reason: HOSPADM

## 2021-02-20 RX ORDER — FENTANYL CITRATE 50 UG/ML
12.5 INJECTION, SOLUTION INTRAMUSCULAR; INTRAVENOUS ONCE
Status: COMPLETED | OUTPATIENT
Start: 2021-02-20 | End: 2021-02-20

## 2021-02-20 RX ORDER — OXYCODONE HYDROCHLORIDE AND ACETAMINOPHEN 5; 325 MG/1; MG/1
1 TABLET ORAL EVERY 4 HOURS PRN
Status: DISCONTINUED | OUTPATIENT
Start: 2021-02-20 | End: 2021-02-22

## 2021-02-20 RX ORDER — INSULIN GLARGINE 100 [IU]/ML
6 INJECTION, SOLUTION SUBCUTANEOUS NIGHTLY
Status: DISCONTINUED | OUTPATIENT
Start: 2021-02-20 | End: 2021-02-24

## 2021-02-20 RX ADMIN — Medication 10 ML: at 21:45

## 2021-02-20 RX ADMIN — FENTANYL CITRATE 12.5 MCG: 50 INJECTION INTRAMUSCULAR; INTRAVENOUS at 17:13

## 2021-02-20 RX ADMIN — HEPARIN SODIUM 5000 UNITS: 5000 INJECTION INTRAVENOUS; SUBCUTANEOUS at 21:41

## 2021-02-20 RX ADMIN — INSULIN LISPRO 6 UNITS: 100 INJECTION, SOLUTION INTRAVENOUS; SUBCUTANEOUS at 12:32

## 2021-02-20 RX ADMIN — OXYCODONE AND ACETAMINOPHEN 1 TABLET: 5; 325 TABLET ORAL at 00:15

## 2021-02-20 RX ADMIN — HEPARIN SODIUM 5000 UNITS: 5000 INJECTION INTRAVENOUS; SUBCUTANEOUS at 14:51

## 2021-02-20 RX ADMIN — Medication 10 ML: at 11:02

## 2021-02-20 RX ADMIN — INSULIN LISPRO 2 UNITS: 100 INJECTION, SOLUTION INTRAVENOUS; SUBCUTANEOUS at 21:41

## 2021-02-20 RX ADMIN — MEROPENEM 500 MG: 500 INJECTION, POWDER, FOR SOLUTION INTRAVENOUS at 11:01

## 2021-02-20 RX ADMIN — ALBUMIN (HUMAN) 25 G: 0.25 INJECTION, SOLUTION INTRAVENOUS at 16:06

## 2021-02-20 RX ADMIN — CALCIUM ACETATE 1334 MG: 667 CAPSULE ORAL at 12:30

## 2021-02-20 RX ADMIN — SODIUM CHLORIDE, PRESERVATIVE FREE 10 ML: 5 INJECTION INTRAVENOUS at 11:01

## 2021-02-20 RX ADMIN — ACETAMINOPHEN 650 MG: 325 TABLET, FILM COATED ORAL at 03:27

## 2021-02-20 RX ADMIN — OXYCODONE AND ACETAMINOPHEN 1 TABLET: 5; 325 TABLET ORAL at 08:50

## 2021-02-20 RX ADMIN — IPRATROPIUM BROMIDE AND ALBUTEROL SULFATE 1 AMPULE: .5; 3 SOLUTION RESPIRATORY (INHALATION) at 17:43

## 2021-02-20 RX ADMIN — CALCIUM ACETATE 1334 MG: 667 CAPSULE ORAL at 08:49

## 2021-02-20 RX ADMIN — METOPROLOL TARTRATE 12.5 MG: 25 TABLET, FILM COATED ORAL at 03:24

## 2021-02-20 RX ADMIN — LEVOTHYROXINE SODIUM 75 MCG: 75 TABLET ORAL at 06:03

## 2021-02-20 RX ADMIN — INSULIN GLARGINE 6 UNITS: 100 INJECTION, SOLUTION SUBCUTANEOUS at 21:41

## 2021-02-20 RX ADMIN — IPRATROPIUM BROMIDE AND ALBUTEROL SULFATE 1 AMPULE: .5; 3 SOLUTION RESPIRATORY (INHALATION) at 22:27

## 2021-02-20 RX ADMIN — IPRATROPIUM BROMIDE AND ALBUTEROL SULFATE 1 AMPULE: .5; 3 SOLUTION RESPIRATORY (INHALATION) at 06:36

## 2021-02-20 RX ADMIN — OXYCODONE AND ACETAMINOPHEN 1 TABLET: 5; 325 TABLET ORAL at 14:51

## 2021-02-20 RX ADMIN — IPRATROPIUM BROMIDE AND ALBUTEROL SULFATE 1 AMPULE: .5; 3 SOLUTION RESPIRATORY (INHALATION) at 10:31

## 2021-02-20 RX ADMIN — HEPARIN SODIUM 5000 UNITS: 5000 INJECTION INTRAVENOUS; SUBCUTANEOUS at 06:03

## 2021-02-20 RX ADMIN — CALCIUM ACETATE 1334 MG: 667 CAPSULE ORAL at 17:20

## 2021-02-20 ASSESSMENT — PAIN DESCRIPTION - DESCRIPTORS
DESCRIPTORS: ACHING;DISCOMFORT;DULL
DESCRIPTORS: ACHING;DISCOMFORT;DULL
DESCRIPTORS: ACHING;DISCOMFORT;SORE

## 2021-02-20 ASSESSMENT — PAIN SCALES - GENERAL
PAINLEVEL_OUTOF10: 8
PAINLEVEL_OUTOF10: 5
PAINLEVEL_OUTOF10: 8
PAINLEVEL_OUTOF10: 8

## 2021-02-20 ASSESSMENT — PAIN DESCRIPTION - ONSET: ONSET: ON-GOING

## 2021-02-20 ASSESSMENT — PAIN DESCRIPTION - LOCATION
LOCATION: BACK
LOCATION: BACK
LOCATION: GENERALIZED

## 2021-02-20 ASSESSMENT — PAIN DESCRIPTION - PAIN TYPE
TYPE: ACUTE PAIN
TYPE: ACUTE PAIN

## 2021-02-20 ASSESSMENT — PAIN DESCRIPTION - PROGRESSION: CLINICAL_PROGRESSION: NOT CHANGED

## 2021-02-20 NOTE — PROGRESS NOTES
Subjective:     2-19:  Extubated 16th, Requiring o2 supplementation, worsening lung infiltrates BL, HGB 5.8, no active bleed, Not on pressors       Patient CO SOB for the last few days, stable, worse with exertion, better with rest, associated occasional cough, gen weakness, generalized pain, no (fever,chills)      PMH,Social Hx and Family Hx : Reviewed; no changes from initial note      Review of Systems    All other systems reviewed and OW negative    Objective:   Data Review:  Vital Signs: BP (!) 184/114   Pulse 96   Temp 97.7 °F (36.5 °C) (Oral)   Resp 17   Ht 5' 4\" (1.626 m)   Wt 161 lb 6 oz (73.2 kg)   SpO2 100%   BMI 27.70 kg/m²     24 Hour I&O Review:     Intake/Output Summary (Last 24 hours) at 2/19/2021 1948  Last data filed at 2/19/2021 1600  Gross per 24 hour   Intake 1140 ml   Output 2181 ml   Net -1041 ml       Physical Exam   VS: reviewed, trend noted  Level of Alertness:   AAOX3, in distress  CONSTITUTIONAL:  Awakes to touch  Throat: clear, no ulcerations or exudate  Ears:clear, no discharge  Neck:supple no LAP, or masses  LUNGS:  +crackles occ wheezes, using accessory muscles  CARDIOVASCULAR: RRR no M/R/G  ABDOMEN:  normal bowel sounds, distended and non-tender to palpation  EXT: No edema, no calf tenderness. Pulses are present bilaterally.   NEUROLOGIC:  Non focal GROSSLY , normal speech  SKIN:  normal skin color  Psych: flat affect      Continuous Infusions:   sodium chloride      dextrose Stopped (02/16/21 1400)         Current Medications: Current Facility-Administered Medications: 0.9 % sodium chloride infusion, , Intravenous, PRN  [START ON 2/20/2021] insulin glargine (LANTUS) injection vial 4 Units, 4 Units, Subcutaneous, Nightly  meropenem (MERREM) 500 mg IVPB (mini-bag), 500 mg, Intravenous, Q24H  oxyCODONE-acetaminophen (PERCOCET) 5-325 MG per tablet 1 tablet, 1 tablet, Oral, Q6H PRN  midodrine (PROAMATINE) tablet 10 mg, 10 mg, Oral, TID WC insulin lispro (HUMALOG) injection vial 0-12 Units, 0-12 Units, Subcutaneous, 4x Daily AC & HS  potassium chloride 20 mEq/50 mL IVPB (Central Line), 20 mEq, Intravenous, PRN  pantoprazole (PROTONIX) injection 40 mg, 40 mg, Intravenous, Daily **AND** sodium chloride (PF) 0.9 % injection 10 mL, 10 mL, Intravenous, Daily  ipratropium-albuterol (DUONEB) nebulizer solution 1 ampule, 1 ampule, Inhalation, Q6H  epoetin nena-epbx (RETACRIT) injection 3,000 Units, 3,000 Units, Subcutaneous, Once per day on Mon Wed Fri **AND** epoetin nena-epbx (RETACRIT) injection 2,000 Units, 2,000 Units, Subcutaneous, Once per day on Mon Wed Fri  glucose (GLUTOSE) 40 % oral gel 15 g, 15 g, Oral, PRN  dextrose 50 % IV solution, 12.5 g, Intravenous, PRN  glucagon (rDNA) injection 1 mg, 1 mg, Intramuscular, PRN  dextrose 5 % solution, 100 mL/hr, Intravenous, PRN  levothyroxine (SYNTHROID) tablet 75 mcg, 75 mcg, Per NG tube, QAM AC  sodium chloride flush 0.9 % injection 10 mL, 10 mL, Intravenous, 2 times per day  sodium chloride flush 0.9 % injection 10 mL, 10 mL, Intravenous, PRN  polyethylene glycol (GLYCOLAX) packet 17 g, 17 g, Oral, Daily PRN  acetaminophen (TYLENOL) tablet 650 mg, 650 mg, Oral, Q6H PRN **OR** acetaminophen (TYLENOL) suppository 650 mg, 650 mg, Rectal, Q6H PRN  heparin (porcine) injection 5,000 Units, 5,000 Units, Subcutaneous, 3 times per day  vancomycin (VANCOCIN) intermittent dosing (placeholder), , Other, RX Placeholder    Ventilator Settings:Vent Information  $Ventilation: Off Vent  Skin Assessment: Clean, dry, & intact  Vent Type: 980  Vent Mode: AC/VC  Vt Ordered: 0 mL  Rate Set: 0 bmp  Peak Flow: 50 L/min  Pressure Support: 5 cmH20  FiO2 : 80 %  SpO2: 100 %  SpO2/FiO2 ratio: 125  Sensitivity: 3  PEEP/CPAP: 5  I Time/ I Time %: 0 s   CBC:  Recent Labs     02/17/21  0448 02/18/21  0522 02/19/21  0521 02/19/21  1422   WBC 20.0* 20.4* 17.5*  --    RBC 2.38* 2.39* 1.99*  --    HGB 7.0* 7.1* 5.8* 7.9* This patient has a high probability of sudden, clinically significant deterioration, which requires the highest level of physician preparedness to intervene urgently.  I managed/supervised life or organ supporting interventions that required frequent physician assessment.   I devoted my full attention to the direct care of this patient for the amount of time indicated below.  Time I spent with the family or surrogate(s) is included only if the patient was incapable of providing the necessary information or participating in medical decisions - Time devoted to teaching and to any procedures I billed separately is not included.     Critical care time 41 minutes      Electronically signed by Violet Peña MD on 2/19/2021 at 7:48 PM

## 2021-02-20 NOTE — PROGRESS NOTES
insulin lispro (HUMALOG) injection vial 0-12 Units, 0-12 Units, Subcutaneous, 4x Daily AC & HS  potassium chloride 20 mEq/50 mL IVPB (Central Line), 20 mEq, Intravenous, PRN  [DISCONTINUED] pantoprazole (PROTONIX) injection 40 mg, 40 mg, Intravenous, Daily **AND** sodium chloride (PF) 0.9 % injection 10 mL, 10 mL, Intravenous, Daily  ipratropium-albuterol (DUONEB) nebulizer solution 1 ampule, 1 ampule, Inhalation, Q6H  epoetin nena-epbx (RETACRIT) injection 3,000 Units, 3,000 Units, Subcutaneous, Once per day on Mon Wed Fri **AND** epoetin nena-epbx (RETACRIT) injection 2,000 Units, 2,000 Units, Subcutaneous, Once per day on Mon Wed Fri  glucose (GLUTOSE) 40 % oral gel 15 g, 15 g, Oral, PRN  dextrose 50 % IV solution, 12.5 g, Intravenous, PRN  glucagon (rDNA) injection 1 mg, 1 mg, Intramuscular, PRN  dextrose 5 % solution, 100 mL/hr, Intravenous, PRN  levothyroxine (SYNTHROID) tablet 75 mcg, 75 mcg, Per NG tube, QAM AC  sodium chloride flush 0.9 % injection 10 mL, 10 mL, Intravenous, 2 times per day  sodium chloride flush 0.9 % injection 10 mL, 10 mL, Intravenous, PRN  polyethylene glycol (GLYCOLAX) packet 17 g, 17 g, Oral, Daily PRN  acetaminophen (TYLENOL) tablet 650 mg, 650 mg, Oral, Q6H PRN **OR** acetaminophen (TYLENOL) suppository 650 mg, 650 mg, Rectal, Q6H PRN  heparin (porcine) injection 5,000 Units, 5,000 Units, Subcutaneous, 3 times per day  vancomycin (VANCOCIN) intermittent dosing (placeholder), , Other, RX Placeholder    Ventilator Settings:Vent Information  $Ventilation: Off Vent  Skin Assessment: Clean, dry, & intact  Vent Type: 980  Vent Mode: AC/VC  Vt Ordered: 0 mL  Rate Set: 0 bmp  Peak Flow: 50 L/min  Pressure Support: 5 cmH20  FiO2 : 55 %  SpO2: 99 %  SpO2/FiO2 ratio: 180  Sensitivity: 3  PEEP/CPAP: 5  I Time/ I Time %: 0 s   CBC:  Recent Labs     02/18/21  0522 02/19/21  0521 02/19/21  1422 02/19/21  2157 02/20/21  0508   WBC 20.4* 17.5*  --   --  17.3*   RBC 2.39* 1.99*  --   --  2.79* HGB 7.1* 5.8* 7.9* 7.9* 8.3*   HCT 21.2* 17.9* 23.9* 23.8* 24.3*    198  --   --  238   MCV 88.7 89.9  --   --  87.1   MCH 29.7 29.1  --   --  29.7   MCHC 33.5 32.4  --   --  34.2   RDW 17.2* 18.0*  --   --  16.1*      BMP:  Recent Labs     02/18/21  0522 02/19/21  0521 02/20/21  0508    140 142   K 4.4 3.9 4.1   CL 99 100 100   CO2 22 23 23   BUN 49* 47* 49*   CREATININE 8.4* 8.0* 8.2*   CALCIUM 7.8* 7.7* 8.1*   GLUCOSE 149* 150* 130*      ABG:  Lab Results   Component Value Date    PH 7.398 02/20/2021    PH 7.290 09/08/2012    PCO2 33.1 02/20/2021    PO2 86.8 02/20/2021    HCO3 20.0 02/20/2021    O2SAT 95.7 02/20/2021       Cultures:  Recent Labs     02/18/21  2152   BC 24 Hours no growth     Recent Labs     02/18/21  2152   BLOODCULT2 24 Hours no growth     Recent Labs     02/18/21  1105   CULTRESP Oral Pharyngeal Celine present  Oral Pharyngeal Celine reduced  *  Rare growth  Identification and sensitivity to follow    Moderate growth       Radiology Review:  Pertinent images / reports were reviewed as a part of this visit. US DUP LOWER EXTREMITY LEFT RODO   Final Result   No evidence of DVT in the left lower extremity. XR CHEST PORTABLE   Final Result   1. No interval change in the multifocal bilateral diffuse hazy pulmonary   infiltrates. XR CHEST PORTABLE   Final Result   Persistent but perhaps slightly improved bilateral diffuse ill-defined   opacities left slightly greater than right. RECOMMENDATION:   Continued radiographic follow-up suggested. XR CHEST PORTABLE   Final Result   Improvement in right-sided opacities and worsening in left-sided airspace   opacities. Finding may represent pulmonary edema versus atelectasis. XR CHEST PORTABLE   Final Result   Extensive primarily right-sided pulmonary infiltrates new since the prior exam      CT HEAD WO CONTRAST   Final Result   No acute intracranial abnormality.       XR ABDOMEN FOR NG/OG/NE TUBE PLACEMENT   Final Result 1. ET tube in satisfactory position. No evidence of acute intrathoracic   disease. 2. NG tube tip in the distal stomach. 3. Nonspecific small bowel distension that could be related to ileus or   small-bowel obstruction. 4. Suspected ascites. XR CHEST PORTABLE   Final Result   1. ET tube in satisfactory position. No evidence of acute intrathoracic   disease. 2. NG tube tip in the distal stomach. 3. Nonspecific small bowel distension that could be related to ileus or   small-bowel obstruction. 4. Suspected ascites.       XR CHEST PORTABLE    (Results Pending)       Other Diagnostic Testing:      Assessment:   DM1 with DKA, COMA, better, hypoglycemia noted, likely iatrogenic     PEA cardiac arrest, no neuro deficit     Acute resp failure with hypoxia, also intubated in the setting of unresponsiveness, extubated 16th, on HF 70%, CXR worsening BL infiltrates (ARDS, contusion, HAP?)     ESRD on PD     Acidosis      UTI unspecified site without hematuria    Severe sepsis without shock    Hyperkalemia     Anemia of chronic kidney disease, dropped,  no active bleed noted      Plan:     o2 to keep sat> 90%, pulm toileting, FU chest imaging, may need NIV    monitor BS, RFP, replace electrolytes, adjusting insulin orders as needed     FU nephrology recs, PD, goal negative I/O     Abx, fu cx    Monitor CBC, PRBC to keep >7 as needed     Discussed with RN, RT re management     ICU Staff Physician note of personal involvement in Care  As the attending physician, I certify that I personally reviewed the patient's history and personally examined the patient to confirm the physical findings described above,  And that I reviewed the relevant imaging studies and available reports.      This patient has a high probability of sudden, clinically significant deterioration, which requires the highest level of physician preparedness to intervene urgently.  I managed/supervised life or organ supporting interventions that required frequent physician assessment.   I devoted my full attention to the direct care of this patient for the amount of time indicated below.  Time I spent with the family or surrogate(s) is included only if the patient was incapable of providing the necessary information or participating in medical decisions - Time devoted to teaching and to any procedures I billed separately is not included.     Critical care time 46 minutes      Electronically signed by Yina Haas MD on 2/20/2021 at 1:40 PM

## 2021-02-20 NOTE — PROGRESS NOTES
Pharmacy Consultation Note  (Antibiotic Dosing and Monitoring)    Initial consult date: 2/15/2021  Consulting physician: Dr. Shirley Evans  Drug(s): Vancomycin  Indication: Sepsis    Ht Readings from Last 1 Encounters:   02/19/21 5' 4\" (1.626 m)     Wt Readings from Last 1 Encounters:   02/20/21 160 lb 15 oz (73 kg)     Age/  Gender IBW DW  Allergy Information   31 y.o.  female 54.7 kg 64.3 kg  Cefepime and Toradol [ketorolac tromethamine]         Date  Tmax WBC Dialysis Drug/Dose Time   Given Level(s)   (Time) Comments   2/15  (#1) -- 8.1 PD Vancomycin 1500 mg IV x 1 1745     2/16  (#2) 99.4 14.2 PD No vancomycin -- Random level @ 0845 = 27.1 mcg/mL    2/17  (#3) 102 20 PD No vancomycin --     2/18  (#4) 102.9 20.4 PD No vancomycin -- Random level @ 0522 = 19.8 mcg/mL    2/19  (#5) 99.9 17.5 PD Vancomycin 1000 mg IV x 1 1042     2/20  (#6) 101.1 17.3 PD No vancomycin --       (#7)            Estimated Creatinine Clearance: 10 mL/min (A) (based on SCr of 8.2 mg/dL (HH)). UOP over the past 24 hours:       Intake/Output Summary (Last 24 hours) at 2/20/2021 1117  Last data filed at 2/20/2021 4715  Gross per 24 hour   Intake 940 ml   Output 1790 ml   Net -850 ml       Anti-infective Regimen:  Anti-infective Dose Date Initiated Date Stopped   Ceftriaxone 1000 mg IV q24hr 2/15 2/17   Pip/tazo 3.375g IV q12hr 2/17 2/18   Meropenem 500 mg q24hr 2/18      Cultures:  available culture and sensitivity results were reviewed in EPIC  Cultures sent and are pending. Culture Date Result    Blood cx 1 2/15 NGTD   Blood cx 2 2/15 NGTD   Respiratory cx 2/18 Few GP diplococci  Rare GNR               Assessment:  · Consulted by Dr. Shirley Evans to dose/monitor vancomycin  · Goal trough level:  15-20 mcg/mL, AUC/DEENA:   400-600  · Pt is a 31 yoF who presented from home in PEA arrest. Patient has hx of ESRD on peritoneal dialysis, diabetes, in DKA this admission, and multiple hospitalizations. Empiric antibiotics initiated for sepsis.

## 2021-02-20 NOTE — PROGRESS NOTES
[address] [place] [address] Hospitalist   Progress Note    Admitting Date and Time: 2/15/2021  8:07 AM  Admit Dx: Cardiac arrest [facility]) [I46.9]    Subjective/interval history:    2/16: Patient was admitted yesterday afternoon with PEA cardiac arrest, sepsis due to urinary tract infection, hyperkalemia in setting of end-stage renal disease on peritoneal dialysis. This morning she is intubated, but awake, alert, appears anxious. 2/17: Patient was extubated yesterday afternoon. She is awake, alert, oriented x3, neurologically intact. Very anxious appearing. States she has upper and lower back pain. 2/18: Patient awake, alert, somewhat somnolent, awakens easily to voice, but appears anxious when awake. She has pain all over and has generalized weakness. 2/19: Overnight, patient was seen for altered mental status and hypoxia. She was minimally responsive to sternal rub, but was protecting her airway. She was on nonrebreather mask, now on heated high flow nasal cannula oxygen. ABG did not show any significant hypercapnia. This morning, she is awake, alert, but complaining of pain all over. She is very tearful. I discussed with her that given her respiratory failure, and recent cardiac arrest, escalating pain medications would cause potential complications clean respiratory depression. 2/20: Patient awake, alert, states her pain has improved. Currently on heated high flow nasal cannula with FiO2 down to 70%. ROS: Complete review of systems negative other than mentioned above.      insulin glargine  6 Units Subcutaneous Nightly    [START ON 2/21/2021] pantoprazole  40 mg Oral QAM AC    Calcium Acetate (Phos Binder)  1,334 mg Oral TID WC    meropenem  500 mg Intravenous Q24H    insulin lispro  0-12 Units Subcutaneous 4x Daily AC & HS    sodium chloride (PF)  10 mL Intravenous Daily    ipratropium-albuterol  1 ampule Inhalation Q6H  epoetin nena-epbx  3,000 Units Subcutaneous Once per day on Mon Wed Fri    And    epoetin nena-epbx  2,000 Units Subcutaneous Once per day on Mon Wed Fri    levothyroxine  75 mcg Per NG tube QAM AC    sodium chloride flush  10 mL Intravenous 2 times per day    heparin (porcine)  5,000 Units Subcutaneous 3 times per day    vancomycin (VANCOCIN) intermittent dosing (placeholder)   Other RX Placeholder         oxyCODONE-acetaminophen, 1 tablet, Q4H PRN      sodium chloride, , PRN      potassium chloride, 20 mEq, PRN      glucose, 15 g, PRN      dextrose, 12.5 g, PRN      glucagon (rDNA), 1 mg, PRN      dextrose, 100 mL/hr, PRN      sodium chloride flush, 10 mL, PRN      polyethylene glycol, 17 g, Daily PRN      acetaminophen, 650 mg, Q6H PRN    Or      acetaminophen, 650 mg, Q6H PRN         Objective:    /71   Pulse 108   Temp 97.8 °F (36.6 °C)   Resp 20   Ht 5' 4\" (1.626 m)   Wt 160 lb 15 oz (73 kg)   SpO2 100%   BMI 27.62 kg/m²   General Appearance: Alert and oriented x3, appears less anxious today skin: warm and dry  Head: normocephalic and atraumatic  Eyes: pupils equal, round, and reactive to light, extraocular eye movements intact, conjunctivae normal  Neck: neck supple and non tender without mass   Pulmonary/Chest: Nonlabored.   Clear to auscultation bilaterally  Cardiovascular: normal rate, normal S1 and S2 and no carotid bruits  Abdomen: soft, non-distended, normal bowel sounds, no masses or organomegaly  Extremities: no cyanosis, no clubbing and no edema  Neurologic: Cranial nerves II through XII grossly intact, speech normal      Recent Labs     02/18/21  0522 02/19/21  0521 02/20/21  0508    140 142   K 4.4 3.9 4.1   CL 99 100 100   CO2 22 23 23   BUN 49* 47* 49*   CREATININE 8.4* 8.0* 8.2*   GLUCOSE 149* 150* 130*   CALCIUM 7.8* 7.7* 8.1*       Recent Labs     02/18/21  0522 02/19/21  0521 02/20/21  0508   ALKPHOS 202* 215* 240*   PROT 5.3* 5.4* 5.7* LABALBU 2.1* 2.8* 2.6*   BILITOT 0.3 0.3 0.3   AST 74* 39* 26   * 118* 88*       Recent Labs     02/18/21  0522 02/19/21  0521 02/19/21  1422 02/19/21  2157 02/20/21  0508   WBC 20.4* 17.5*  --   --  17.3*   RBC 2.39* 1.99*  --   --  2.79*   HGB 7.1* 5.8* 7.9* 7.9* 8.3*   HCT 21.2* 17.9* 23.9* 23.8* 24.3*   MCV 88.7 89.9  --   --  87.1   MCH 29.7 29.1  --   --  29.7   MCHC 33.5 32.4  --   --  34.2   RDW 17.2* 18.0*  --   --  16.1*    198  --   --  238   MPV 10.7 11.2  --   --  10.7       CBC:   Lab Results   Component Value Date    WBC 17.3 02/20/2021    RBC 2.79 02/20/2021    HGB 8.3 02/20/2021    HCT 24.3 02/20/2021    MCV 87.1 02/20/2021    MCH 29.7 02/20/2021    MCHC 34.2 02/20/2021    RDW 16.1 02/20/2021     02/20/2021    MPV 10.7 02/20/2021     CMP:    Lab Results   Component Value Date     02/20/2021    K 4.1 02/20/2021    K 5.4 02/15/2021     02/20/2021    CO2 23 02/20/2021    BUN 49 02/20/2021    CREATININE 8.2 02/20/2021    GFRAA 7 02/20/2021    LABGLOM 7 02/20/2021    GLUCOSE 130 02/20/2021    GLUCOSE 130 05/18/2012    PROT 5.7 02/20/2021    LABALBU 2.6 02/20/2021    LABALBU 4.1 05/18/2012    CALCIUM 8.1 02/20/2021    BILITOT 0.3 02/20/2021    ALKPHOS 240 02/20/2021    AST 26 02/20/2021    ALT 88 02/20/2021        Radiology:   XR CHEST PORTABLE   Final Result   1. No interval change in the multifocal bilateral diffuse hazy pulmonary   infiltrates. XR CHEST PORTABLE   Final Result   Persistent but perhaps slightly improved bilateral diffuse ill-defined   opacities left slightly greater than right. RECOMMENDATION:   Continued radiographic follow-up suggested. XR CHEST PORTABLE   Final Result   Improvement in right-sided opacities and worsening in left-sided airspace   opacities. Finding may represent pulmonary edema versus atelectasis.       XR CHEST PORTABLE   Final Result   Extensive primarily right-sided pulmonary infiltrates new since the prior exam CT HEAD WO CONTRAST   Final Result   No acute intracranial abnormality. XR ABDOMEN FOR NG/OG/NE TUBE PLACEMENT   Final Result   1. ET tube in satisfactory position. No evidence of acute intrathoracic   disease. 2. NG tube tip in the distal stomach. 3. Nonspecific small bowel distension that could be related to ileus or   small-bowel obstruction. 4. Suspected ascites. XR CHEST PORTABLE   Final Result   1. ET tube in satisfactory position. No evidence of acute intrathoracic   disease. 2. NG tube tip in the distal stomach. 3. Nonspecific small bowel distension that could be related to ileus or   small-bowel obstruction. 4. Suspected ascites. US DUP LOWER EXTREMITY LEFT RODO    (Results Pending)   XR CHEST PORTABLE    (Results Pending)       Assessment/Plan:  Principal Problem:    Cardiac arrest ([address]  Active Problems:    Diabetic ketoacidosis with coma associated with type 1 diabetes mellitus ([address]    UTI (urinary tract infection)    Diabetes mellitus type 1, uncontrolled ([address]    Sepsis ([address]    Hypertension    Acute respiratory failure with hypoxia ([address]    ESRD on peritoneal dialysis ([address]    Shock liver    Bacterial pneumonia  Resolved Problems:    * No resolved hospital problems. *      1. PEA cardiac arrest with successful resuscitation  -Possibly due to arrhythmia from electrolyte abnormalities. She was only slightly hyperkalemic on blood chemistries, however this was after she was given treatment for probable hyperkalemia based on EKG findings  -Monitor electrolytes and correct as necessary  -Treating potential underlying causes including electrolyte abnormalities, DKA, sepsis    2. Acute hypoxic respiratory failure due to bacterial pneumonia  -Antibiotics as noted below  -Currently on heated high flow nasal cannula oxygen, 5 to 10 to 70% today, pulmonary critical care following.   She will remain in ICU today per pulmonary/critical care    3.  Diabetic ketoacidosis in the setting of uncontrolled type 1 diabetes mellitus  -Off insulin drip, now on subcutaneous insulin    4. Sepsis due to urinary tract infection and right sided pneumonia  -Continue vancomycin, and meropenem day #5 total of antibiotics. -Blood cultures drawn on 2/15 and repeat cultures on 2/18 showing no growth to date. 5.  Hyperkalemia  -Resolved  -Continue peritoneal dialysis, nephrology following     6. Uncontrolled type 1 diabetes mellitus  -Basal and corrective scale insulin for now. Plan to resume insulin pump at discharge     7.  End-stage renal disease on peritoneal dialysis  -Nephrology following for dialysis management     8. Shock liver  -Transaminases continue to improve, bilirubin normal    9. Essential HTN  -Metoprolol tartrate resumed 2/17     Code Status: Full code  DVT prophylaxis: Subcutaneous heparin    NOTE: This report was transcribed using voice recognition software. Every effort was made to ensure accuracy; however, inadvertent computerized transcription errors may be present.      Electronically signed by Laurie Varma DO on 2/20/2021 at 9:28 AM

## 2021-02-20 NOTE — PROGRESS NOTES
Department of Internal Medicine  Nephrology Attending Consult Note    Events reviewed. SUBJECTIVE:  We are following Wilton Flores for ESRD on Peritoneal Dialysis. Reports generalized muscle aches.     PHYSICAL EXAM:      Vitals:    VITALS:  BP (!) 184/114   Pulse 96   Temp 97.7 °F (36.5 °C) (Oral)   Resp 17   Ht 5' 4\" (1.626 m)   Wt 161 lb 6 oz (73.2 kg)   SpO2 100%   BMI 27.70 kg/m²   24HR INTAKE/OUTPUT:      Intake/Output Summary (Last 24 hours) at 2/19/2021 1944  Last data filed at 2/19/2021 1600  Gross per 24 hour   Intake 1140 ml   Output 2181 ml   Net -1041 ml     PD Catheter Exam:  PD catheter exit site clean    Constitutional: Awake, chronically ill  HEENT:  Normocephalic, PERRL  Respiratory: Decreased breath sounds on the basis  Cardiovascular/Edema:  RRR, S1/S2  Gastrointestinal:  Soft, PD catheter exit site clean   Neurologic:  Nonfocal, AVELAR  Skin:  Warm, dry, no lesions  Other:  +edema    Scheduled Meds:   [START ON 2/20/2021] insulin glargine  4 Units Subcutaneous Nightly    meropenem  500 mg Intravenous Q24H    midodrine  10 mg Oral TID WC    insulin lispro  0-12 Units Subcutaneous 4x Daily AC & HS    pantoprazole  40 mg Intravenous Daily    And    sodium chloride (PF)  10 mL Intravenous Daily    ipratropium-albuterol  1 ampule Inhalation Q6H    epoetin nena-epbx  3,000 Units Subcutaneous Once per day on Mon Wed Fri    And    epoetin nena-epbx  2,000 Units Subcutaneous Once per day on Mon Wed Fri    levothyroxine  75 mcg Per NG tube QAM AC    sodium chloride flush  10 mL Intravenous 2 times per day    heparin (porcine)  5,000 Units Subcutaneous 3 times per day    vancomycin (VANCOCIN) intermittent dosing (placeholder)   Other RX Placeholder     Continuous Infusions:   sodium chloride      dextrose Stopped (02/16/21 1400) PRN Meds:.sodium chloride, oxyCODONE-acetaminophen, potassium chloride, glucose, dextrose, glucagon (rDNA), dextrose, sodium chloride flush, polyethylene glycol, acetaminophen **OR** acetaminophen    DATA:    CBC:   Lab Results   Component Value Date    WBC 17.5 02/19/2021    RBC 1.99 02/19/2021    HGB 7.9 02/19/2021    HCT 23.9 02/19/2021    MCV 89.9 02/19/2021    MCH 29.1 02/19/2021    MCHC 32.4 02/19/2021    RDW 18.0 02/19/2021     02/19/2021    MPV 11.2 02/19/2021     CMP:    Lab Results   Component Value Date     02/19/2021    K 3.9 02/19/2021    K 5.4 02/15/2021     02/19/2021    CO2 23 02/19/2021    BUN 47 02/19/2021    CREATININE 8.0 02/19/2021    GFRAA 7 02/19/2021    LABGLOM 7 02/19/2021    GLUCOSE 150 02/19/2021    GLUCOSE 130 05/18/2012    PROT 5.4 02/19/2021    LABALBU 2.8 02/19/2021    LABALBU 4.1 05/18/2012    CALCIUM 7.7 02/19/2021    BILITOT 0.3 02/19/2021    ALKPHOS 215 02/19/2021    AST 39 02/19/2021     02/19/2021     Magnesium:    Lab Results   Component Value Date    MG 1.6 02/17/2021     Phosphorus:    Lab Results   Component Value Date    PHOS 9.4 02/19/2021     Radiology Review:      Chest x-ray February 15, 2021   1. ET tube in satisfactory position.  No evidence of acute intrathoracic   disease. 2. NG tube tip in the distal stomach. 3. Nonspecific small bowel distension that could be related to ileus or   small-bowel obstruction. 4. Suspected ascites.          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  She was admitted to MICU with a diagnosis of DKA and status post cardiac arrest, she was intubated. Patient was extubated earlier today. Problems resolved:    · Respiratory failure, status post intubation, extubated   · Type I DM, DKA, anion gap and bicarbonate levels improved, off insulin drip  · S/p PEA cardiac arrest      IMPRESSION/RECOMMENDATIONS:      1. ESRD on peritoneal dialysis, to continue CCPD  2. Probably sepsis, on meropenem and vancomycin  3. MBD of CKD, calcium acetate 667 g 2 tablets 3 times daily  4. Anemia of CKD, on epoetin alpha 5000 units 3 times a week      Plan:    · Continue CCPD prescription  2.5% all exchanges  · Continue to monitor labs   .

## 2021-02-20 NOTE — PLAN OF CARE
Problem: Breathing Pattern - Ineffective:  Goal: Ability to achieve and maintain a regular respiratory rate will improve  Description: Ability to achieve and maintain a regular respiratory rate will improve  Outcome: Met This Shift     Problem: Pain:  Goal: Pain level will decrease  Description: Pain level will decrease  Outcome: Met This Shift  Goal: Control of acute pain  Description: Control of acute pain  Outcome: Met This Shift

## 2021-02-20 NOTE — PROGRESS NOTES
Department of Internal Medicine  Nephrology Progress Note    Events reviewed. SUBJECTIVE:  We are following Wilton Flores for ESRD on Peritoneal Dialysis. Reports generalized muscle aches.  She reports she is feeling\" a little better today\" Case discussed with RN.    PHYSICAL EXAM:      Vitals:    VITALS:  BP (!) 127/96   Pulse 103   Temp 97.3 °F (36.3 °C) (Oral)   Resp 16   Ht 5' 4\" (1.626 m)   Wt 160 lb 15 oz (73 kg)   SpO2 99%   BMI 27.62 kg/m²   24HR INTAKE/OUTPUT:      Intake/Output Summary (Last 24 hours) at 2/20/2021 1519  Last data filed at 2/20/2021 1400  Gross per 24 hour   Intake 450 ml   Output 1790 ml   Net -1340 ml     PD Catheter Exam:  PD catheter exit site clean    Constitutional: Awake, chronically ill  HEENT:  Normocephalic, PERRL  Respiratory: Decreased breath sounds on the basis  Cardiovascular/Edema:  RRR, S1/S2  Gastrointestinal:  Soft, PD catheter exit site clean   Neurologic:  Nonfocal, AVELAR  Skin:  Warm, dry, no lesions  Other:  +edema    Scheduled Meds:   insulin glargine  6 Units Subcutaneous Nightly    [START ON 2/21/2021] pantoprazole  40 mg Oral QAM AC    Calcium Acetate (Phos Binder)  1,334 mg Oral TID WC    meropenem  500 mg Intravenous Q24H    insulin lispro  0-12 Units Subcutaneous 4x Daily AC & HS    sodium chloride (PF)  10 mL Intravenous Daily    ipratropium-albuterol  1 ampule Inhalation Q6H    epoetin nena-epbx  3,000 Units Subcutaneous Once per day on Mon Wed Fri    And    epoetin nena-epbx  2,000 Units Subcutaneous Once per day on Mon Wed Fri    levothyroxine  75 mcg Per NG tube QAM AC    sodium chloride flush  10 mL Intravenous 2 times per day    heparin (porcine)  5,000 Units Subcutaneous 3 times per day    vancomycin (VANCOCIN) intermittent dosing (placeholder)   Other RX Placeholder     Continuous Infusions:   sodium chloride      dextrose Stopped (02/16/21 1400) PRN Meds:.oxyCODONE-acetaminophen, sodium chloride, potassium chloride, glucose, dextrose, glucagon (rDNA), dextrose, sodium chloride flush, polyethylene glycol, acetaminophen **OR** acetaminophen    DATA:    CBC:   Lab Results   Component Value Date    WBC 17.3 02/20/2021    RBC 2.79 02/20/2021    HGB 8.3 02/20/2021    HCT 24.3 02/20/2021    MCV 87.1 02/20/2021    MCH 29.7 02/20/2021    MCHC 34.2 02/20/2021    RDW 16.1 02/20/2021     02/20/2021    MPV 10.7 02/20/2021     CMP:    Lab Results   Component Value Date     02/20/2021    K 4.1 02/20/2021    K 5.4 02/15/2021     02/20/2021    CO2 23 02/20/2021    BUN 49 02/20/2021    CREATININE 8.2 02/20/2021    GFRAA 7 02/20/2021    LABGLOM 7 02/20/2021    GLUCOSE 130 02/20/2021    GLUCOSE 130 05/18/2012    PROT 5.7 02/20/2021    LABALBU 2.6 02/20/2021    LABALBU 4.1 05/18/2012    CALCIUM 8.1 02/20/2021    BILITOT 0.3 02/20/2021    ALKPHOS 240 02/20/2021    AST 26 02/20/2021    ALT 88 02/20/2021     Magnesium:    Lab Results   Component Value Date    MG 1.6 02/17/2021     Phosphorus:    Lab Results   Component Value Date    PHOS 10.0 02/20/2021     Radiology Review:      Chest x-ray February 15, 2021   1. ET tube in satisfactory position.  No evidence of acute intrathoracic   disease. 2. NG tube tip in the distal stomach. 3. Nonspecific small bowel distension that could be related to ileus or   small-bowel obstruction. 4. Suspected ascites.        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  She was admitted to MICU with a diagnosis of DKA and status post cardiac arrest, she was intubated. Patient was extubated earlier today. Problems resolved:    · Respiratory failure, status post intubation, extubated   · Type I DM, DKA, anion gap and bicarbonate levels improved, off insulin drip  · S/p PEA cardiac arrest      IMPRESSION/RECOMMENDATIONS:      1. ESRD on peritoneal dialysis, to continue CCPD Total UF 1790mL,  drainage clear pale yellow  2. Probably sepsis, on meropenem and vancomycin. WBCs 20.4>>>17.5>>>17.3. .Max temp 101.5, blood cultures no growth 2/18  3. MBD of CKD, calcium acetate 667 g  2 tablets 3 times daily. Phos  9.4>>>10   4. Anemia of CKD, continue epoetin alpha 5000 units 3 times a week. H/H 8.3/24.3  5. Transaminitis improving, trending downward ALT 88, AST 26   6.  Malnutrition, protein 5.7, albumin 2.6, Renal supplements BID      Plan:    · Continue CCPD prescription  2.5% all exchanges  · SPA 25 gm iv daily x 3  · Continue to monitor labs  · Check Vitamin D level in am  · Magnesium level in am   .

## 2021-02-20 NOTE — PROGRESS NOTES
ccpd complete. Total UF 1790 mls. Effluent drainage clear pale yellow. Stay safe cap applied using aseptic technique.

## 2021-02-20 NOTE — PLAN OF CARE
Problem: Skin Integrity:  Goal: Will show no infection signs and symptoms  Description: Will show no infection signs and symptoms  Outcome: Not Met This Shift

## 2021-02-21 ENCOUNTER — APPOINTMENT (OUTPATIENT)
Dept: GENERAL RADIOLOGY | Age: 31
DRG: 710 | End: 2021-02-21
Payer: COMMERCIAL

## 2021-02-21 LAB
ALBUMIN SERPL-MCNC: 2.7 G/DL (ref 3.5–5.2)
ALP BLD-CCNC: 228 U/L (ref 35–104)
ALT SERPL-CCNC: 53 U/L (ref 0–32)
ANION GAP SERPL CALCULATED.3IONS-SCNC: 19 MMOL/L (ref 7–16)
AST SERPL-CCNC: 16 U/L (ref 0–31)
BILIRUB SERPL-MCNC: 0.3 MG/DL (ref 0–1.2)
BUN BLDV-MCNC: 48 MG/DL (ref 6–20)
CALCIUM SERPL-MCNC: 8.4 MG/DL (ref 8.6–10.2)
CHLORIDE BLD-SCNC: 100 MMOL/L (ref 98–107)
CO2: 23 MMOL/L (ref 22–29)
CREAT SERPL-MCNC: 8.1 MG/DL (ref 0.5–1)
CULTURE, RESPIRATORY: ABNORMAL
GFR AFRICAN AMERICAN: 7
GFR NON-AFRICAN AMERICAN: 7 ML/MIN/1.73
GLUCOSE BLD-MCNC: 242 MG/DL (ref 74–99)
HCT VFR BLD CALC: 23.6 % (ref 34–48)
HEMOGLOBIN: 7.7 G/DL (ref 11.5–15.5)
MAGNESIUM: 1.9 MG/DL (ref 1.6–2.6)
MCH RBC QN AUTO: 28.9 PG (ref 26–35)
MCHC RBC AUTO-ENTMCNC: 32.6 % (ref 32–34.5)
MCV RBC AUTO: 88.7 FL (ref 80–99.9)
METER GLUCOSE: 153 MG/DL (ref 74–99)
METER GLUCOSE: 171 MG/DL (ref 74–99)
METER GLUCOSE: 187 MG/DL (ref 74–99)
METER GLUCOSE: 200 MG/DL (ref 74–99)
ORGANISM: ABNORMAL
ORGANISM: ABNORMAL
PDW BLD-RTO: 16.4 FL (ref 11.5–15)
PHOSPHORUS: 8.9 MG/DL (ref 2.5–4.5)
PLATELET # BLD: 285 E9/L (ref 130–450)
PMV BLD AUTO: 10.9 FL (ref 7–12)
POTASSIUM SERPL-SCNC: 3.7 MMOL/L (ref 3.5–5)
RBC # BLD: 2.66 E12/L (ref 3.5–5.5)
SMEAR, RESPIRATORY: ABNORMAL
SODIUM BLD-SCNC: 142 MMOL/L (ref 132–146)
TOTAL PROTEIN: 5.8 G/DL (ref 6.4–8.3)
VANCOMYCIN RANDOM: 31.3 MCG/ML (ref 5–40)
VITAMIN D 25-HYDROXY: 7 NG/ML (ref 30–100)
WBC # BLD: 9.8 E9/L (ref 4.5–11.5)

## 2021-02-21 PROCEDURE — 99232 SBSQ HOSP IP/OBS MODERATE 35: CPT | Performed by: INTERNAL MEDICINE

## 2021-02-21 PROCEDURE — 82962 GLUCOSE BLOOD TEST: CPT

## 2021-02-21 PROCEDURE — 90945 DIALYSIS ONE EVALUATION: CPT

## 2021-02-21 PROCEDURE — 6360000002 HC RX W HCPCS: Performed by: INTERNAL MEDICINE

## 2021-02-21 PROCEDURE — 94640 AIRWAY INHALATION TREATMENT: CPT

## 2021-02-21 PROCEDURE — 82306 VITAMIN D 25 HYDROXY: CPT

## 2021-02-21 PROCEDURE — 85027 COMPLETE CBC AUTOMATED: CPT

## 2021-02-21 PROCEDURE — 6370000000 HC RX 637 (ALT 250 FOR IP): Performed by: INTERNAL MEDICINE

## 2021-02-21 PROCEDURE — 2580000003 HC RX 258: Performed by: INTERNAL MEDICINE

## 2021-02-21 PROCEDURE — 2700000000 HC OXYGEN THERAPY PER DAY

## 2021-02-21 PROCEDURE — 83735 ASSAY OF MAGNESIUM: CPT

## 2021-02-21 PROCEDURE — 80053 COMPREHEN METABOLIC PANEL: CPT

## 2021-02-21 PROCEDURE — 71045 X-RAY EXAM CHEST 1 VIEW: CPT

## 2021-02-21 PROCEDURE — 36415 COLL VENOUS BLD VENIPUNCTURE: CPT

## 2021-02-21 PROCEDURE — 2500000003 HC RX 250 WO HCPCS: Performed by: INTERNAL MEDICINE

## 2021-02-21 PROCEDURE — 84100 ASSAY OF PHOSPHORUS: CPT

## 2021-02-21 PROCEDURE — 80202 ASSAY OF VANCOMYCIN: CPT

## 2021-02-21 PROCEDURE — 2000000000 HC ICU R&B

## 2021-02-21 RX ORDER — VITAMIN B COMPLEX
1000 TABLET ORAL DAILY
Status: DISCONTINUED | OUTPATIENT
Start: 2021-02-21 | End: 2021-02-27 | Stop reason: HOSPADM

## 2021-02-21 RX ORDER — FENTANYL CITRATE 50 UG/ML
12.5 INJECTION, SOLUTION INTRAMUSCULAR; INTRAVENOUS EVERY 4 HOURS PRN
Status: DISCONTINUED | OUTPATIENT
Start: 2021-02-21 | End: 2021-02-21

## 2021-02-21 RX ORDER — FENTANYL CITRATE 50 UG/ML
12.5 INJECTION, SOLUTION INTRAMUSCULAR; INTRAVENOUS EVERY 8 HOURS PRN
Status: DISCONTINUED | OUTPATIENT
Start: 2021-02-21 | End: 2021-02-23

## 2021-02-21 RX ORDER — ERGOCALCIFEROL 1.25 MG/1
50000 CAPSULE ORAL WEEKLY
Status: DISCONTINUED | OUTPATIENT
Start: 2021-02-21 | End: 2021-02-27 | Stop reason: HOSPADM

## 2021-02-21 RX ADMIN — Medication 1000 UNITS: at 14:52

## 2021-02-21 RX ADMIN — Medication 10 ML: at 08:54

## 2021-02-21 RX ADMIN — ERGOCALCIFEROL 50000 UNITS: 1.25 CAPSULE ORAL at 16:40

## 2021-02-21 RX ADMIN — PANTOPRAZOLE SODIUM 40 MG: 40 TABLET, DELAYED RELEASE ORAL at 06:32

## 2021-02-21 RX ADMIN — HEPARIN SODIUM 5000 UNITS: 5000 INJECTION INTRAVENOUS; SUBCUTANEOUS at 06:32

## 2021-02-21 RX ADMIN — HEPARIN SODIUM 5000 UNITS: 5000 INJECTION INTRAVENOUS; SUBCUTANEOUS at 14:52

## 2021-02-21 RX ADMIN — CALCIUM ACETATE 1334 MG: 667 CAPSULE ORAL at 08:54

## 2021-02-21 RX ADMIN — Medication 10 ML: at 21:21

## 2021-02-21 RX ADMIN — LEVOTHYROXINE SODIUM 75 MCG: 75 TABLET ORAL at 06:32

## 2021-02-21 RX ADMIN — INSULIN GLARGINE 6 UNITS: 100 INJECTION, SOLUTION SUBCUTANEOUS at 21:17

## 2021-02-21 RX ADMIN — CALCIUM ACETATE 1334 MG: 667 CAPSULE ORAL at 16:40

## 2021-02-21 RX ADMIN — INSULIN LISPRO 2 UNITS: 100 INJECTION, SOLUTION INTRAVENOUS; SUBCUTANEOUS at 16:42

## 2021-02-21 RX ADMIN — CALCIUM ACETATE 1334 MG: 667 CAPSULE ORAL at 12:38

## 2021-02-21 RX ADMIN — IPRATROPIUM BROMIDE AND ALBUTEROL SULFATE 1 AMPULE: .5; 3 SOLUTION RESPIRATORY (INHALATION) at 11:45

## 2021-02-21 RX ADMIN — INSULIN LISPRO 2 UNITS: 100 INJECTION, SOLUTION INTRAVENOUS; SUBCUTANEOUS at 21:17

## 2021-02-21 RX ADMIN — OXYCODONE AND ACETAMINOPHEN 1 TABLET: 5; 325 TABLET ORAL at 14:52

## 2021-02-21 RX ADMIN — SODIUM CHLORIDE, PRESERVATIVE FREE 10 ML: 5 INJECTION INTRAVENOUS at 08:54

## 2021-02-21 RX ADMIN — HEPARIN SODIUM 5000 UNITS: 5000 INJECTION INTRAVENOUS; SUBCUTANEOUS at 21:18

## 2021-02-21 RX ADMIN — INSULIN LISPRO 4 UNITS: 100 INJECTION, SOLUTION INTRAVENOUS; SUBCUTANEOUS at 06:33

## 2021-02-21 RX ADMIN — IPRATROPIUM BROMIDE AND ALBUTEROL SULFATE 1 AMPULE: .5; 3 SOLUTION RESPIRATORY (INHALATION) at 17:11

## 2021-02-21 RX ADMIN — OXYCODONE AND ACETAMINOPHEN 1 TABLET: 5; 325 TABLET ORAL at 10:37

## 2021-02-21 RX ADMIN — INSULIN LISPRO 4 UNITS: 100 INJECTION, SOLUTION INTRAVENOUS; SUBCUTANEOUS at 12:39

## 2021-02-21 RX ADMIN — METOPROLOL TARTRATE 25 MG: 25 TABLET, FILM COATED ORAL at 12:37

## 2021-02-21 RX ADMIN — IPRATROPIUM BROMIDE AND ALBUTEROL SULFATE 1 AMPULE: .5; 3 SOLUTION RESPIRATORY (INHALATION) at 06:18

## 2021-02-21 RX ADMIN — MEROPENEM 500 MG: 500 INJECTION, POWDER, FOR SOLUTION INTRAVENOUS at 10:50

## 2021-02-21 RX ADMIN — IPRATROPIUM BROMIDE AND ALBUTEROL SULFATE 1 AMPULE: .5; 3 SOLUTION RESPIRATORY (INHALATION) at 22:05

## 2021-02-21 ASSESSMENT — PAIN SCALES - GENERAL
PAINLEVEL_OUTOF10: 0
PAINLEVEL_OUTOF10: 8

## 2021-02-21 ASSESSMENT — PAIN DESCRIPTION - FREQUENCY
FREQUENCY: CONTINUOUS

## 2021-02-21 ASSESSMENT — PAIN DESCRIPTION - ONSET: ONSET: ON-GOING

## 2021-02-21 ASSESSMENT — PAIN DESCRIPTION - LOCATION: LOCATION: BACK;CHEST

## 2021-02-21 ASSESSMENT — PAIN DESCRIPTION - PROGRESSION
CLINICAL_PROGRESSION: NOT CHANGED
CLINICAL_PROGRESSION: NOT CHANGED

## 2021-02-21 ASSESSMENT — PAIN - FUNCTIONAL ASSESSMENT: PAIN_FUNCTIONAL_ASSESSMENT: PREVENTS OR INTERFERES WITH MANY ACTIVE NOT PASSIVE ACTIVITIES

## 2021-02-21 ASSESSMENT — PAIN DESCRIPTION - DESCRIPTORS: DESCRIPTORS: ACHING;DISCOMFORT;DULL

## 2021-02-21 ASSESSMENT — PAIN DESCRIPTION - PAIN TYPE
TYPE: ACUTE PAIN
TYPE: ACUTE PAIN

## 2021-02-21 NOTE — PROGRESS NOTES
[address] [place] [address] Hospitalist   Progress Note    Admitting Date and Time: 2/15/2021  8:07 AM  Admit Dx: Cardiac arrest [facility]) [I46.9]    Subjective/interval history:    2/16: Patient was admitted yesterday afternoon with PEA cardiac arrest, sepsis due to urinary tract infection, hyperkalemia in setting of end-stage renal disease on peritoneal dialysis. This morning she is intubated, but awake, alert, appears anxious. 2/17: Patient was extubated yesterday afternoon. She is awake, alert, oriented x3, neurologically intact. Very anxious appearing. States she has upper and lower back pain. 2/18: Patient awake, alert, somewhat somnolent, awakens easily to voice, but appears anxious when awake. She has pain all over and has generalized weakness. 2/19: Overnight, patient was seen for altered mental status and hypoxia. She was minimally responsive to sternal rub, but was protecting her airway. She was on nonrebreather mask, now on heated high flow nasal cannula oxygen. ABG did not show any significant hypercapnia. This morning, she is awake, alert, but complaining of pain all over. She is very tearful. I discussed with her that given her respiratory failure, and recent cardiac arrest, escalating pain medications would cause potential complications clean respiratory depression. 2/20: Patient awake, alert, states her pain has improved. Currently on heated high flow nasal cannula with FiO2 down to 70%. 2/21: Patient states she feels overall better today, but still has significant chest pain from chest compressions. Off of heated high flow nasal cannula oxygen, now on 8 L high flow nasal cannula oxygen. ROS: Complete review of systems negative other than mentioned above.      insulin glargine  6 Units Subcutaneous Nightly    pantoprazole  40 mg Oral QAM AC    Calcium Acetate (Phos Binder)  1,334 mg Oral TID WC    meropenem  500 mg Intravenous Q24H  insulin lispro  0-12 Units Subcutaneous 4x Daily AC & HS    sodium chloride (PF)  10 mL Intravenous Daily    ipratropium-albuterol  1 ampule Inhalation Q6H    epoetin nena-epbx  3,000 Units Subcutaneous Once per day on Mon Wed Fri    And    epoetin nena-epbx  2,000 Units Subcutaneous Once per day on Mon Wed Fri    levothyroxine  75 mcg Per NG tube QAM AC    sodium chloride flush  10 mL Intravenous 2 times per day    heparin (porcine)  5,000 Units Subcutaneous 3 times per day    vancomycin (VANCOCIN) intermittent dosing (placeholder)   Other RX Placeholder         fentanNYL, 12.5 mcg, Q4H PRN      oxyCODONE-acetaminophen, 1 tablet, Q4H PRN      sodium chloride, , PRN      potassium chloride, 20 mEq, PRN      glucose, 15 g, PRN      dextrose, 12.5 g, PRN      glucagon (rDNA), 1 mg, PRN      dextrose, 100 mL/hr, PRN      sodium chloride flush, 10 mL, PRN      polyethylene glycol, 17 g, Daily PRN      acetaminophen, 650 mg, Q6H PRN    Or      acetaminophen, 650 mg, Q6H PRN         Objective:    BP 95/70   Pulse 116   Temp 99.5 °F (37.5 °C) (Oral)   Resp 16   Ht 5' 4\" (1.626 m)   Wt 160 lb 15 oz (73 kg)   SpO2 94%   BMI 27.62 kg/m²   General Appearance: Alert and oriented x3, does not appear to be in any distress this morning   skin: warm and dry  Head: normocephalic and atraumatic  Eyes: pupils equal, round, and reactive to light, extraocular eye movements intact, conjunctivae normal  Neck: neck supple and non tender without mass   Pulmonary/Chest: Nonlabored.   Diminished on the left, clear to auscultation on the right  Cardiovascular: normal rate, normal S1 and S2 and no carotid bruits  Abdomen: soft, non-distended, normal bowel sounds, no masses or organomegaly  Extremities: Left leg with 1+ edema, as well as a large bulla on the shin  Neurologic: Cranial nerves II through XII grossly intact, speech normal      Recent Labs     02/19/21  0521 02/20/21  0508 02/21/21  0527    142 142 K 3.9 4.1 3.7    100 100   CO2 23 23 23   BUN 47* 49* 48*   CREATININE 8.0* 8.2* 8.1*   GLUCOSE 150* 130* 242*   CALCIUM 7.7* 8.1* 8.4*       Recent Labs     02/19/21 0521 02/20/21 0508 02/21/21 0527   ALKPHOS 215* 240* 228*   PROT 5.4* 5.7* 5.8*   LABALBU 2.8* 2.6* 2.7*   BILITOT 0.3 0.3 0.3   AST 39* 26 16   * 88* 53*       Recent Labs     02/19/21 0521 02/19/21 0521 02/19/21 2157 02/20/21 0508 02/21/21 0527   WBC 17.5*  --   --  17.3* 9.8   RBC 1.99*  --   --  2.79* 2.66*   HGB 5.8*   < > 7.9* 8.3* 7.7*   HCT 17.9*   < > 23.8* 24.3* 23.6*   MCV 89.9  --   --  87.1 88.7   MCH 29.1  --   --  29.7 28.9   MCHC 32.4  --   --  34.2 32.6   RDW 18.0*  --   --  16.1* 16.4*     --   --  238 285   MPV 11.2  --   --  10.7 10.9    < > = values in this interval not displayed. CBC:   Lab Results   Component Value Date    WBC 9.8 02/21/2021    RBC 2.66 02/21/2021    HGB 7.7 02/21/2021    HCT 23.6 02/21/2021    MCV 88.7 02/21/2021    MCH 28.9 02/21/2021    MCHC 32.6 02/21/2021    RDW 16.4 02/21/2021     02/21/2021    MPV 10.9 02/21/2021     CMP:    Lab Results   Component Value Date     02/21/2021    K 3.7 02/21/2021    K 5.4 02/15/2021     02/21/2021    CO2 23 02/21/2021    BUN 48 02/21/2021    CREATININE 8.1 02/21/2021    GFRAA 7 02/21/2021    LABGLOM 7 02/21/2021    GLUCOSE 242 02/21/2021    GLUCOSE 130 05/18/2012    PROT 5.8 02/21/2021    LABALBU 2.7 02/21/2021    LABALBU 4.1 05/18/2012    CALCIUM 8.4 02/21/2021    BILITOT 0.3 02/21/2021    ALKPHOS 228 02/21/2021    AST 16 02/21/2021    ALT 53 02/21/2021        Radiology:   US DUP LOWER EXTREMITY LEFT RODO   Final Result   No evidence of DVT in the left lower extremity. XR CHEST PORTABLE   Final Result   1. No interval change in the multifocal bilateral diffuse hazy pulmonary   infiltrates.       XR CHEST PORTABLE   Final Result   Persistent but perhaps slightly improved bilateral diffuse ill-defined opacities left slightly greater than right. RECOMMENDATION:   Continued radiographic follow-up suggested. XR CHEST PORTABLE   Final Result   Improvement in right-sided opacities and worsening in left-sided airspace   opacities. Finding may represent pulmonary edema versus atelectasis. XR CHEST PORTABLE   Final Result   Extensive primarily right-sided pulmonary infiltrates new since the prior exam      CT HEAD WO CONTRAST   Final Result   No acute intracranial abnormality. XR ABDOMEN FOR NG/OG/NE TUBE PLACEMENT   Final Result   1. ET tube in satisfactory position. No evidence of acute intrathoracic   disease. 2. NG tube tip in the distal stomach. 3. Nonspecific small bowel distension that could be related to ileus or   small-bowel obstruction. 4. Suspected ascites. XR CHEST PORTABLE   Final Result   1. ET tube in satisfactory position. No evidence of acute intrathoracic   disease. 2. NG tube tip in the distal stomach. 3. Nonspecific small bowel distension that could be related to ileus or   small-bowel obstruction. 4. Suspected ascites. XR CHEST PORTABLE    (Results Pending)       Assessment/Plan:  Principal Problem:    Cardiac arrest ([address]  Active Problems:    Diabetic ketoacidosis with coma associated with type 1 diabetes mellitus ([address]    UTI (urinary tract infection)    Diabetes mellitus type 1, uncontrolled ([address]    Sepsis ([address]    Hypertension    Acute respiratory failure with hypoxia ([address]    ESRD on peritoneal dialysis ([address]    Shock liver    Bacterial pneumonia  Resolved Problems:    * No resolved hospital problems. *      1. PEA cardiac arrest with successful resuscitation  -Possibly due to arrhythmia from electrolyte abnormalities.   She was only slightly hyperkalemic on blood chemistries, however this was after she was given treatment for probable hyperkalemia based on EKG findings  -Monitor electrolytes and correct as necessary -Treating potential underlying causes including electrolyte abnormalities, DKA, sepsis    2. Acute hypoxic respiratory failure due to bacterial pneumonia  -Antibiotics as noted below  -Improving  -On 8 L high flow nasal cannula oxygen     3. Diabetic ketoacidosis in the setting of uncontrolled type 1 diabetes mellitus  -Off insulin drip, now on subcutaneous insulin    4. Sepsis due to urinary tract infection and right sided pneumonia  -Continue vancomycin, and meropenem day #6 total of antibiotics. -Blood cultures drawn on 2/15 and repeat cultures on 2/18 showing no growth to date. 5.  Hyperkalemia  -Resolved  -Continue peritoneal dialysis, nephrology following     6. Uncontrolled type 1 diabetes mellitus  -Basal and corrective scale insulin for now. Plan to resume insulin pump at discharge     7.  End-stage renal disease on peritoneal dialysis  -Nephrology following for dialysis management     8. Shock liver  -Transaminases continue to improve, bilirubin normal    9. Essential HTN  -Metoprolol tartrate resumed 2/17 her blood pressure has been well controlled     Code Status: Full code  DVT prophylaxis: Subcutaneous heparin    NOTE: This report was transcribed using voice recognition software. Every effort was made to ensure accuracy; however, inadvertent computerized transcription errors may be present.      Electronically signed by Garrett Palm DO on 2/21/2021 at 9:17 AM

## 2021-02-21 NOTE — FLOWSHEET NOTE
This note also relates to the following rows which could not be included:  Pulse - Cannot attach notes to unvalidated device data  Resp - Cannot attach notes to unvalidated device data  SpO2 - Cannot attach notes to unvalidated device data    CCPD tx intitiated at this time utilizing aseptic practice. Dressing changed per protocol. Site unremarkable in nature. No machine difficulties with drain and fill cycles. Produced clear translucent Pale yellow effluent product. Tolerated well. No voiced complaints.

## 2021-02-21 NOTE — PROGRESS NOTES
Pulmonary/Critical Care Progress Note    We are following patient for diabetes mellitus type 1 with DKA, hypoglycemia, narcotic use by history, coma, status post PEA cardiac arrest with intubation, now extubated, receiving peritoneal dialysis for chronic kidney disease, probable UTI, sepsis, anemia    SUBJECTIVE:  The patient has been extubated since 2/16 but still with bilateral lung infiltrates in need for nasal cannula oxygen supplementation. The patient continues on antibiotics including meropenem and vancomycin. Her white blood cell count has come down considerably today. She is growing Serratia from her prior sputum which is sensitive to ertapenem. She is not currently requiring pressors and is being followed closely by the renal service for her peritoneal dialysis using 2.5% dialysate. The patient has a long history of hypertension and at home is on multiple oral antihypertensives. She also has a long history of tachycardia and requires both metoprolol and diltiazem by mouth to control these problems. We restarted her metoprolol today but if this is not enough, her diltiazem will be restarted.       MEDICATIONS:   metoprolol tartrate  25 mg Oral BID    Vitamin D  1,000 Units Oral Daily    insulin glargine  6 Units Subcutaneous Nightly    pantoprazole  40 mg Oral QAM AC    Calcium Acetate (Phos Binder)  1,334 mg Oral TID WC    meropenem  500 mg Intravenous Q24H    insulin lispro  0-12 Units Subcutaneous 4x Daily AC & HS    sodium chloride (PF)  10 mL Intravenous Daily    ipratropium-albuterol  1 ampule Inhalation Q6H    epoetin nena-epbx  3,000 Units Subcutaneous Once per day on Mon Wed Fri    And    epoetin nena-epbx  2,000 Units Subcutaneous Once per day on Mon Wed Fri    levothyroxine  75 mcg Per NG tube QAM AC    sodium chloride flush  10 mL Intravenous 2 times per day    heparin (porcine)  5,000 Units Subcutaneous 3 times per day  vancomycin (VANCOCIN) intermittent dosing (placeholder)   Other RX Placeholder      sodium chloride      dextrose Stopped (02/16/21 1400)     fentanNYL, oxyCODONE-acetaminophen, sodium chloride, potassium chloride, glucose, dextrose, glucagon (rDNA), dextrose, sodium chloride flush, polyethylene glycol, acetaminophen **OR** acetaminophen      REVIEW OF SYSTEMS:  Constitutional: Denies fever, weight loss, night sweats, and fatigue  Skin: Denies pigmentation, dark lesions, and rashes   HEENT: Denies hearing loss, tinnitus, ear drainage, epistaxis, sore throat, and hoarseness. Cardiovascular: Denies palpitations, chest pain, and chest pressure. Respiratory: Denies cough, dyspnea at rest, hemoptysis, apnea, and choking. Gastrointestinal: Denies nausea, vomiting, poor appetite, diarrhea, heartburn or reflux  Genitourinary: Denies dysuria, frequency, urgency or hematuria  Musculoskeletal: Denies myalgias, muscle weakness, and bone pain  Neurological: Denies dizziness, vertigo, headache, and focal weakness  Psychological: Denies anxiety and depression  Endocrine: Denies heat intolerance and cold intolerance  Hematopoietic/Lymphatic: Denies bleeding problems and blood transfusions    OBJECTIVE:  Vitals:    02/21/21 1300   BP: 108/73   Pulse: 127   Resp: 17   Temp:    SpO2: (!) 65%     FiO2 : 50 %  O2 Flow Rate (L/min): 8 L/min  O2 Device: High flow nasal cannula    PHYSICAL EXAM:  Constitutional: Low-grade fever of 99.4, but no chills, diaphoresis  Skin: No skin rash, no skin breakdown  HEENT: Unremarkable  Neck: No JVD, lymphadenopathy, thyromegaly  Cardiovascular: S1, S2 normal.  No S3 murmurs or rubs present  Respiratory: Fine inspiratory crackles over both posterior lung fields.   No wheezes are heard  Gastrointestinal: Soft, flat, nontender  Genitourinary: No CVA tenderness  Extremities: Clubbing, cyanosis, or edema Neurological: Awake, arousable, oriented x3. No evidence of focal motor or sensory deficits but sluggish to respond to questions  Psychological: Flat affect    LABS:  WBC   Date Value Ref Range Status   02/21/2021 9.8 4.5 - 11.5 E9/L Final   02/20/2021 17.3 (H) 4.5 - 11.5 E9/L Final   02/19/2021 17.5 (H) 4.5 - 11.5 E9/L Final     Hemoglobin   Date Value Ref Range Status   02/21/2021 7.7 (L) 11.5 - 15.5 g/dL Final   02/20/2021 8.3 (L) 11.5 - 15.5 g/dL Final   02/19/2021 7.9 (L) 11.5 - 15.5 g/dL Final     Hematocrit   Date Value Ref Range Status   02/21/2021 23.6 (L) 34.0 - 48.0 % Final   02/20/2021 24.3 (L) 34.0 - 48.0 % Final   02/19/2021 23.8 (L) 34.0 - 48.0 % Final     MCV   Date Value Ref Range Status   02/21/2021 88.7 80.0 - 99.9 fL Final   02/20/2021 87.1 80.0 - 99.9 fL Final   02/19/2021 89.9 80.0 - 99.9 fL Final     Platelets   Date Value Ref Range Status   02/21/2021 285 130 - 450 E9/L Final   02/20/2021 238 130 - 450 E9/L Final   02/19/2021 198 130 - 450 E9/L Final     Sodium   Date Value Ref Range Status   02/21/2021 142 132 - 146 mmol/L Final   02/20/2021 142 132 - 146 mmol/L Final   02/19/2021 140 132 - 146 mmol/L Final     Potassium   Date Value Ref Range Status   02/21/2021 3.7 3.5 - 5.0 mmol/L Final   02/20/2021 4.1 3.5 - 5.0 mmol/L Final   02/19/2021 3.9 3.5 - 5.0 mmol/L Final     Potassium reflex Magnesium   Date Value Ref Range Status   02/15/2021 5.4 (H) 3.5 - 5.0 mmol/L Final   01/24/2021 5.7 (H) 3.5 - 5.0 mmol/L Final     Comment:     Specimen is moderately Hemolyzed. Result may be artificially increased. 12/28/2020 6.0 (H) 3.5 - 5.0 mmol/L Final     Comment:     Specimen is moderately Hemolyzed. Result may be artificially increased.      Chloride   Date Value Ref Range Status   02/21/2021 100 98 - 107 mmol/L Final   02/20/2021 100 98 - 107 mmol/L Final   02/19/2021 100 98 - 107 mmol/L Final     CO2   Date Value Ref Range Status   02/21/2021 23 22 - 29 mmol/L Final 02/20/2021 23 22 - 29 mmol/L Final   02/19/2021 23 22 - 29 mmol/L Final     BUN   Date Value Ref Range Status   02/21/2021 48 (H) 6 - 20 mg/dL Final   02/20/2021 49 (H) 6 - 20 mg/dL Final   02/19/2021 47 (H) 6 - 20 mg/dL Final     CREATININE   Date Value Ref Range Status   02/21/2021 8.1 (HH) 0.5 - 1.0 mg/dL Final   02/20/2021 8.2 (HH) 0.5 - 1.0 mg/dL Final   02/19/2021 8.0 (H) 0.5 - 1.0 mg/dL Final     Glucose   Date Value Ref Range Status   02/21/2021 242 (H) 74 - 99 mg/dL Final   02/20/2021 130 (H) 74 - 99 mg/dL Final   02/19/2021 150 (H) 74 - 99 mg/dL Final   05/18/2012 130 (H) 70 - 110 mg/dL Final   04/26/2012 61 (L) 70 - 110 mg/dL Final   04/25/2012 196 (H) 70 - 110 mg/dL Final     Calcium   Date Value Ref Range Status   02/21/2021 8.4 (L) 8.6 - 10.2 mg/dL Final   02/20/2021 8.1 (L) 8.6 - 10.2 mg/dL Final   02/19/2021 7.7 (L) 8.6 - 10.2 mg/dL Final     Total Protein   Date Value Ref Range Status   02/21/2021 5.8 (L) 6.4 - 8.3 g/dL Final   02/20/2021 5.7 (L) 6.4 - 8.3 g/dL Final   02/19/2021 5.4 (L) 6.4 - 8.3 g/dL Final     Albumin   Date Value Ref Range Status   02/21/2021 2.7 (L) 3.5 - 5.2 g/dL Final   02/20/2021 2.6 (L) 3.5 - 5.2 g/dL Final   02/19/2021 2.8 (L) 3.5 - 5.2 g/dL Final   05/18/2012 4.1 3.2 - 4.8 g/dL Final   04/23/2012 4.0 3.2 - 4.8 g/dL Final   04/02/2012 4.8 3.2 - 4.8 g/dL Final     Total Bilirubin   Date Value Ref Range Status   02/21/2021 0.3 0.0 - 1.2 mg/dL Final   02/20/2021 0.3 0.0 - 1.2 mg/dL Final   02/19/2021 0.3 0.0 - 1.2 mg/dL Final     Alkaline Phosphatase   Date Value Ref Range Status   02/21/2021 228 (H) 35 - 104 U/L Final   02/20/2021 240 (H) 35 - 104 U/L Final   02/19/2021 215 (H) 35 - 104 U/L Final     AST   Date Value Ref Range Status   02/21/2021 16 0 - 31 U/L Final     Comment:     Specimen is slightly Hemolyzed. Result may be artificially increased. 02/20/2021 26 0 - 31 U/L Final     Comment:     Specimen is slightly Hemolyzed. Result may be artificially increased. 02/19/2021 39 (H) 0 - 31 U/L Final     ALT   Date Value Ref Range Status   02/21/2021 53 (H) 0 - 32 U/L Final   02/20/2021 88 (H) 0 - 32 U/L Final   02/19/2021 118 (H) 0 - 32 U/L Final     GFR Non-   Date Value Ref Range Status   02/21/2021 7 >=60 mL/min/1.73 Final     Comment:     Chronic Kidney Disease: less than 60 ml/min/1.73 sq.m. Kidney Failure: less than 15 ml/min/1.73 sq.m. Results valid for patients 18 years and older. 02/20/2021 7 >=60 mL/min/1.73 Final     Comment:     Chronic Kidney Disease: less than 60 ml/min/1.73 sq.m. Kidney Failure: less than 15 ml/min/1.73 sq.m. Results valid for patients 18 years and older. 02/19/2021 7 >=60 mL/min/1.73 Final     Comment:     Chronic Kidney Disease: less than 60 ml/min/1.73 sq.m. Kidney Failure: less than 15 ml/min/1.73 sq.m. Results valid for patients 18 years and older. GFR    Date Value Ref Range Status   02/21/2021 7  Final   02/20/2021 7  Final   02/19/2021 7  Final     Magnesium   Date Value Ref Range Status   02/21/2021 1.9 1.6 - 2.6 mg/dL Final   02/17/2021 1.6 1.6 - 2.6 mg/dL Final   02/16/2021 1.6 1.6 - 2.6 mg/dL Final     Phosphorus   Date Value Ref Range Status   02/21/2021 8.9 (H) 2.5 - 4.5 mg/dL Final   02/20/2021 10.0 (HH) 2.5 - 4.5 mg/dL Final   02/19/2021 9.4 (HH) 2.5 - 4.5 mg/dL Final     Recent Labs     02/20/21  0519   PH 7.398   PO2 86.8   PCO2 33.1*   HCO3 20.0*   BE -4.3*   O2SAT 95.7       RADIOLOGY:  XR CHEST PORTABLE   Final Result   No change in bilateral airspace opacities. US DUP LOWER EXTREMITY LEFT RODO   Final Result   No evidence of DVT in the left lower extremity. XR CHEST PORTABLE   Final Result   1. No interval change in the multifocal bilateral diffuse hazy pulmonary   infiltrates. XR CHEST PORTABLE   Final Result   Persistent but perhaps slightly improved bilateral diffuse ill-defined   opacities left slightly greater than right.

## 2021-02-21 NOTE — PROGRESS NOTES
Pharmacy Consultation Note  (Antibiotic Dosing and Monitoring)    Initial consult date: 2/15/2021  Consulting physician: Dr. Kayla Raines  Drug(s): Vancomycin  Indication: Sepsis    Ht Readings from Last 1 Encounters:   02/19/21 5' 4\" (1.626 m)     Wt Readings from Last 1 Encounters:   02/20/21 160 lb 15 oz (73 kg)     Age/  Gender IBW DW  Allergy Information   31 y.o.  female 54.7 kg 64.3 kg  Cefepime and Toradol [ketorolac tromethamine]         Date  Tmax WBC Dialysis Drug/Dose Time   Given Level(s)   (Time) Comments   2/15  (#1) -- 8.1 PD Vancomycin 1500 mg IV x 1 1745     2/16  (#2) 99.4 14.2 PD No vancomycin -- Random level @ 0845 = 27.1 mcg/mL    2/17  (#3) 102 20 PD No vancomycin --     2/18  (#4) 102.9 20.4 PD No vancomycin -- Random level @ 0522 = 19.8 mcg/mL    2/19  (#5) 99.9 17.5 PD Vancomycin 1000 mg IV x 1 1042     2/20  (#6) 101.1 17.3 PD No vancomycin --     2/21  (#7) 99.5 9.8 PD No vancomycin -- Random level @0527 = 31.3 mcg/mL      Estimated Creatinine Clearance: 10 mL/min (A) (based on SCr of 8.1 mg/dL (HH)). UOP over the past 24 hours:       Intake/Output Summary (Last 24 hours) at 2/21/2021 0925  Last data filed at 2/20/2021 1606  Gross per 24 hour   Intake 200 ml   Output    Net 200 ml       Anti-infective Regimen:  Anti-infective Dose Date Initiated Date Stopped   Ceftriaxone 1000 mg IV q24hr 2/15 2/17   Pip/tazo 3.375g IV q12hr 2/17 2/18   Meropenem 500 mg q24hr 2/18      Cultures:  available culture and sensitivity results were reviewed in EPIC  Cultures sent and are pending.   Culture Date Result    Blood cx 1 2/15 NGTD   Blood cx 2 2/15 NGTD   Respiratory cx 2/18 Serratia marcescens  Candida albicans  Few GP Diplococci               Assessment:  · Consulted by Dr. Kayla Raines to dose/monitor vancomycin  · Goal trough level:  15-20 mcg/mL, AUC/DEENA:   400-600 · Pt is a 31 yoF who presented from home in PEA arrest. Patient has hx of ESRD on peritoneal dialysis, diabetes, in DKA this admission, and multiple hospitalizations. Empiric antibiotics initiated for sepsis. · Hx ESRD, HD on peritoneal dialysis.   · 2/16: Random level = 27.1 mcg/mL  · 2/18: Random level = 19.8 mcg/mL  · 2/21: Random level = 31.3 mcg/mL    Plan:  · PD continues - no additional vancomycin today  · Follow dialysis schedule for further dosing and levels  · Pharmacist will follow and monitor/adjust dosing as necessary      Thank you for the consult,  Lupillo Angeles, PharmD, BCPS 2/21/2021 9:25 AM

## 2021-02-21 NOTE — PLAN OF CARE
Problem: Skin Integrity:  Goal: Will show no infection signs and symptoms  Description: Will show no infection signs and symptoms  Outcome: Met This Shift     Problem: Skin Integrity:  Goal: Absence of new skin breakdown  Description: Absence of new skin breakdown  Outcome: Met This Shift     Problem: Pain:  Goal: Pain level will decrease  Description: Pain level will decrease  Outcome: Met This Shift     Problem: Pain:  Goal: Control of acute pain  Description: Control of acute pain  Outcome: Met This Shift     Problem: Pain:  Goal: Control of chronic pain  Description: Control of chronic pain  Outcome: Met This Shift

## 2021-02-21 NOTE — PROGRESS NOTES
PRN Meds:.fentanNYL, perflutren lipid microspheres, oxyCODONE-acetaminophen, sodium chloride, potassium chloride, glucose, dextrose, glucagon (rDNA), dextrose, sodium chloride flush, polyethylene glycol, acetaminophen **OR** acetaminophen    DATA:    CBC:   Lab Results   Component Value Date    WBC 9.8 02/21/2021    RBC 2.66 02/21/2021    HGB 7.7 02/21/2021    HCT 23.6 02/21/2021    MCV 88.7 02/21/2021    MCH 28.9 02/21/2021    MCHC 32.6 02/21/2021    RDW 16.4 02/21/2021     02/21/2021    MPV 10.9 02/21/2021     CMP:    Lab Results   Component Value Date     02/21/2021    K 3.7 02/21/2021    K 5.4 02/15/2021     02/21/2021    CO2 23 02/21/2021    BUN 48 02/21/2021    CREATININE 8.1 02/21/2021    GFRAA 7 02/21/2021    LABGLOM 7 02/21/2021    GLUCOSE 242 02/21/2021    GLUCOSE 130 05/18/2012    PROT 5.8 02/21/2021    LABALBU 2.7 02/21/2021    LABALBU 4.1 05/18/2012    CALCIUM 8.4 02/21/2021    BILITOT 0.3 02/21/2021    ALKPHOS 228 02/21/2021    AST 16 02/21/2021    ALT 53 02/21/2021     Magnesium:    Lab Results   Component Value Date    MG 1.9 02/21/2021     Phosphorus:    Lab Results   Component Value Date    PHOS 8.9 02/21/2021     Radiology Review:      Chest x-ray February 15, 2021   1. ET tube in satisfactory position.  No evidence of acute intrathoracic   disease. 2. NG tube tip in the distal stomach. 3. Nonspecific small bowel distension that could be related to ileus or   small-bowel obstruction. 4. Suspected ascites.        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  She was admitted to MICU with a diagnosis of DKA and status post cardiac arrest, she was intubated. Patient was extubated earlier today. Problems resolved:    · Respiratory failure, status post intubation, extubated   · Type I DM, DKA, anion gap and bicarbonate levels improved, off insulin drip  · S/p PEA cardiac arrest      IMPRESSION/RECOMMENDATIONS:      1. ESRD on peritoneal dialysis, to continue CCPD Total UF 1979 mL,  drainage clear pale yellow  2. Probably sepsis, on meropenem and vancomycin. WBCs 20.4>>>17.5>>>17.3. .Max temp 101.5, blood cultures no growth 2/18  3. MBD of CKD, calcium acetate 667 g  2 tablets 3 times daily. Phos  9.4>>>10   4. Anemia of CKD, continue epoetin alpha 5000 units 3 times a week. H/H 8.3/24.3  5. Transaminitis improving, trending downward ALT 88, AST 26   6. Malnutrition, protein 5.7, albumin 2.6, Renal supplements BID  7. Vit D deficiency-Results for Janey Stout (MRN [id number]) as of 2/21/2021 16:05   Ref.  Range 2/21/2021 05:27   Vit D, 25-Hydroxy Latest Ref Range: 30 - 100 ng/mL 7 (L)       Plan:    · Continue CCPD prescription  2.5% all exchanges  · Start ergo 94178 units weekly  · SPA 25 gm iv daily x 3  · Check 2 dm echo  · Continue to monitor labs  · Check Vitamin D level in am  · Magnesium level in am   .

## 2021-02-22 ENCOUNTER — APPOINTMENT (OUTPATIENT)
Dept: GENERAL RADIOLOGY | Age: 31
DRG: 710 | End: 2021-02-22
Payer: COMMERCIAL

## 2021-02-22 ENCOUNTER — ANESTHESIA EVENT (OUTPATIENT)
Dept: OPERATING ROOM | Age: 31
DRG: 710 | End: 2021-02-22
Payer: COMMERCIAL

## 2021-02-22 LAB
ALBUMIN SERPL-MCNC: 2.6 G/DL (ref 3.5–5.2)
ALP BLD-CCNC: 241 U/L (ref 35–104)
ALT SERPL-CCNC: 38 U/L (ref 0–32)
ANION GAP SERPL CALCULATED.3IONS-SCNC: 17 MMOL/L (ref 7–16)
ANTISTREPTOLYSIN-O: 46 IU/ML (ref 0–200)
AST SERPL-CCNC: 17 U/L (ref 0–31)
BILIRUB SERPL-MCNC: 0.3 MG/DL (ref 0–1.2)
BUN BLDV-MCNC: 50 MG/DL (ref 6–20)
C-REACTIVE PROTEIN: 12.8 MG/DL (ref 0–0.4)
CALCIUM SERPL-MCNC: 8.8 MG/DL (ref 8.6–10.2)
CHLORIDE BLD-SCNC: 98 MMOL/L (ref 98–107)
CO2: 24 MMOL/L (ref 22–29)
CREAT SERPL-MCNC: 8.5 MG/DL (ref 0.5–1)
GFR AFRICAN AMERICAN: 7
GFR NON-AFRICAN AMERICAN: 7 ML/MIN/1.73
GLUCOSE BLD-MCNC: 173 MG/DL (ref 74–99)
HCT VFR BLD CALC: 24.6 % (ref 34–48)
HEMOGLOBIN: 8 G/DL (ref 11.5–15.5)
LV EF: 68 %
LVEF MODALITY: NORMAL
MCH RBC QN AUTO: 29.2 PG (ref 26–35)
MCHC RBC AUTO-ENTMCNC: 32.5 % (ref 32–34.5)
MCV RBC AUTO: 89.8 FL (ref 80–99.9)
METER GLUCOSE: 105 MG/DL (ref 74–99)
METER GLUCOSE: 416 MG/DL (ref 74–99)
PDW BLD-RTO: 16.1 FL (ref 11.5–15)
PHOSPHORUS: 8.3 MG/DL (ref 2.5–4.5)
PLATELET # BLD: 328 E9/L (ref 130–450)
PMV BLD AUTO: 10.4 FL (ref 7–12)
POTASSIUM SERPL-SCNC: 3.7 MMOL/L (ref 3.5–5)
PROCALCITONIN: 6.14 NG/ML (ref 0–0.08)
RBC # BLD: 2.74 E12/L (ref 3.5–5.5)
SEDIMENTATION RATE, ERYTHROCYTE: 95 MM/HR (ref 0–20)
SODIUM BLD-SCNC: 139 MMOL/L (ref 132–146)
TOTAL PROTEIN: 5.9 G/DL (ref 6.4–8.3)
WBC # BLD: 8.6 E9/L (ref 4.5–11.5)

## 2021-02-22 PROCEDURE — 93306 TTE W/DOPPLER COMPLETE: CPT

## 2021-02-22 PROCEDURE — 2500000003 HC RX 250 WO HCPCS: Performed by: INTERNAL MEDICINE

## 2021-02-22 PROCEDURE — 86140 C-REACTIVE PROTEIN: CPT

## 2021-02-22 PROCEDURE — 73590 X-RAY EXAM OF LOWER LEG: CPT

## 2021-02-22 PROCEDURE — 82962 GLUCOSE BLOOD TEST: CPT

## 2021-02-22 PROCEDURE — 99233 SBSQ HOSP IP/OBS HIGH 50: CPT | Performed by: INTERNAL MEDICINE

## 2021-02-22 PROCEDURE — 6370000000 HC RX 637 (ALT 250 FOR IP): Performed by: INTERNAL MEDICINE

## 2021-02-22 PROCEDURE — 6360000002 HC RX W HCPCS: Performed by: INTERNAL MEDICINE

## 2021-02-22 PROCEDURE — 87075 CULTR BACTERIA EXCEPT BLOOD: CPT

## 2021-02-22 PROCEDURE — 86060 ANTISTREPTOLYSIN O TITER: CPT

## 2021-02-22 PROCEDURE — 2700000000 HC OXYGEN THERAPY PER DAY

## 2021-02-22 PROCEDURE — 87070 CULTURE OTHR SPECIMN AEROBIC: CPT

## 2021-02-22 PROCEDURE — 85027 COMPLETE CBC AUTOMATED: CPT

## 2021-02-22 PROCEDURE — 87205 SMEAR GRAM STAIN: CPT

## 2021-02-22 PROCEDURE — 36415 COLL VENOUS BLD VENIPUNCTURE: CPT

## 2021-02-22 PROCEDURE — 94640 AIRWAY INHALATION TREATMENT: CPT

## 2021-02-22 PROCEDURE — 84100 ASSAY OF PHOSPHORUS: CPT

## 2021-02-22 PROCEDURE — 2000000000 HC ICU R&B

## 2021-02-22 PROCEDURE — 85651 RBC SED RATE NONAUTOMATED: CPT

## 2021-02-22 PROCEDURE — 80053 COMPREHEN METABOLIC PANEL: CPT

## 2021-02-22 PROCEDURE — 84145 PROCALCITONIN (PCT): CPT

## 2021-02-22 PROCEDURE — 2580000003 HC RX 258: Performed by: INTERNAL MEDICINE

## 2021-02-22 RX ORDER — OXYCODONE HYDROCHLORIDE AND ACETAMINOPHEN 5; 325 MG/1; MG/1
1 TABLET ORAL EVERY 4 HOURS PRN
Status: DISCONTINUED | OUTPATIENT
Start: 2021-02-22 | End: 2021-02-27 | Stop reason: HOSPADM

## 2021-02-22 RX ORDER — OXYCODONE HYDROCHLORIDE AND ACETAMINOPHEN 5; 325 MG/1; MG/1
2 TABLET ORAL EVERY 4 HOURS PRN
Status: DISCONTINUED | OUTPATIENT
Start: 2021-02-22 | End: 2021-02-27 | Stop reason: HOSPADM

## 2021-02-22 RX ADMIN — EPOETIN ALFA-EPBX 3000 UNITS: 3000 INJECTION, SOLUTION INTRAVENOUS; SUBCUTANEOUS at 11:34

## 2021-02-22 RX ADMIN — SODIUM CHLORIDE, PRESERVATIVE FREE 10 ML: 5 INJECTION INTRAVENOUS at 11:35

## 2021-02-22 RX ADMIN — OXYCODONE AND ACETAMINOPHEN 2 TABLET: 5; 325 TABLET ORAL at 11:29

## 2021-02-22 RX ADMIN — IPRATROPIUM BROMIDE AND ALBUTEROL SULFATE 1 AMPULE: .5; 3 SOLUTION RESPIRATORY (INHALATION) at 11:38

## 2021-02-22 RX ADMIN — IPRATROPIUM BROMIDE AND ALBUTEROL SULFATE 1 AMPULE: .5; 3 SOLUTION RESPIRATORY (INHALATION) at 06:26

## 2021-02-22 RX ADMIN — IPRATROPIUM BROMIDE AND ALBUTEROL SULFATE 1 AMPULE: .5; 3 SOLUTION RESPIRATORY (INHALATION) at 23:00

## 2021-02-22 RX ADMIN — MEROPENEM 500 MG: 500 INJECTION, POWDER, FOR SOLUTION INTRAVENOUS at 11:33

## 2021-02-22 RX ADMIN — HEPARIN SODIUM 5000 UNITS: 5000 INJECTION INTRAVENOUS; SUBCUTANEOUS at 06:37

## 2021-02-22 RX ADMIN — INSULIN LISPRO 12 UNITS: 100 INJECTION, SOLUTION INTRAVENOUS; SUBCUTANEOUS at 16:26

## 2021-02-22 RX ADMIN — IPRATROPIUM BROMIDE AND ALBUTEROL SULFATE 1 AMPULE: .5; 3 SOLUTION RESPIRATORY (INHALATION) at 18:27

## 2021-02-22 RX ADMIN — Medication 1000 UNITS: at 08:59

## 2021-02-22 RX ADMIN — INSULIN LISPRO 2 UNITS: 100 INJECTION, SOLUTION INTRAVENOUS; SUBCUTANEOUS at 06:38

## 2021-02-22 RX ADMIN — CALCIUM ACETATE 1334 MG: 667 CAPSULE ORAL at 08:59

## 2021-02-22 RX ADMIN — PANTOPRAZOLE SODIUM 40 MG: 40 TABLET, DELAYED RELEASE ORAL at 06:37

## 2021-02-22 RX ADMIN — Medication 10 ML: at 09:00

## 2021-02-22 RX ADMIN — CALCIUM ACETATE 1334 MG: 667 CAPSULE ORAL at 16:32

## 2021-02-22 RX ADMIN — INSULIN GLARGINE 6 UNITS: 100 INJECTION, SOLUTION SUBCUTANEOUS at 20:01

## 2021-02-22 RX ADMIN — METOPROLOL TARTRATE 25 MG: 25 TABLET, FILM COATED ORAL at 08:59

## 2021-02-22 RX ADMIN — HEPARIN SODIUM 5000 UNITS: 5000 INJECTION INTRAVENOUS; SUBCUTANEOUS at 23:00

## 2021-02-22 RX ADMIN — EPOETIN ALFA-EPBX 2000 UNITS: 2000 INJECTION, SOLUTION INTRAVENOUS; SUBCUTANEOUS at 11:34

## 2021-02-22 RX ADMIN — LEVOTHYROXINE SODIUM 75 MCG: 75 TABLET ORAL at 06:37

## 2021-02-22 RX ADMIN — OXYCODONE AND ACETAMINOPHEN 1 TABLET: 5; 325 TABLET ORAL at 00:46

## 2021-02-22 RX ADMIN — Medication 10 ML: at 19:54

## 2021-02-22 RX ADMIN — OXYCODONE AND ACETAMINOPHEN 2 TABLET: 5; 325 TABLET ORAL at 16:32

## 2021-02-22 RX ADMIN — HEPARIN SODIUM 5000 UNITS: 5000 INJECTION INTRAVENOUS; SUBCUTANEOUS at 14:42

## 2021-02-22 ASSESSMENT — PAIN DESCRIPTION - PROGRESSION
CLINICAL_PROGRESSION: NOT CHANGED

## 2021-02-22 ASSESSMENT — PAIN SCALES - GENERAL
PAINLEVEL_OUTOF10: 8
PAINLEVEL_OUTOF10: 0
PAINLEVEL_OUTOF10: 8

## 2021-02-22 ASSESSMENT — PAIN DESCRIPTION - DESCRIPTORS: DESCRIPTORS: ACHING;CONSTANT;DISCOMFORT

## 2021-02-22 ASSESSMENT — PAIN DESCRIPTION - LOCATION: LOCATION: RIB CAGE

## 2021-02-22 NOTE — CONSULTS
Podiatry Resident Consult Note    SUBJECTIVE:   This is a 31 y.o. female who was admitted to the hospital for cardiac arrest and seen bedside for left lower extremity swelling with blisters. Patient chief complaint is painful lesion of her left leg. Patient states she was found unresponsive on 15 February and subsequently brought to the emergency room. She states here she was noted to have abnormal lab values and was found to have multiple areas of infection including pneumonia and UTI. Patient states since she has awakened again she noticed a blister forming on her left leg and that her left leg was swollen. She states it appears to be getting worse over time. She notes pain is 6 out of 10 at time of exam.  She also notes pressure to the area to be an aggravating factor. No known alleviating factors. No previous course of treatment. .    Patient does not follow with foot and ankle provider. Patient admits to lethargy and occasional shortness of breath. Patient denies fever, chills, nausea, vomiting, and chest pain.     Past Medical History:   Diagnosis Date    Acute congestive heart failure ([address]     BC (acute kidney injury) ([address] 10/1/2019    Cephalgia 10/9/2019    Chronic kidney disease     Depression     Diabetes mellitus ([address]     Diabetic ketoacidosis ([address] 8/27/2011    [facility]     History of blood transfusion 11/2019    Hyperlipidemia 10/8/2020    Hypothyroidism 10/8/2020    Iron deficiency anemia 10/1/2019    MDRO (multiple drug resistant organisms) resistance     MRSA (methicillin resistant Staphylococcus aureus)     back wound abcess    Non compliance w medication regimen 3/30/2016    Other disorders of kidney and ureter     Pregnancy 3/30/2016    16 weeks    Seizure ([address] 11/20/2020    Severe pre-eclampsia in third trimester 8/22/2016    Shock liver 2/15/2021        Past Surgical History:   Procedure Laterality Date    BACK SURGERY      abscess   SECTION      x2    CHOLECYSTECTOMY, LAPAROSCOPIC N/A 2019    CHOLECYSTECTOMY LAPAROSCOPIC performed by Skyla Drummond MD at [facility] N/A 2018    COLONOSCOPY WITH BIOPSY performed by Claudene Reedy, MD at [address] N/A 2018    COLONOSCOPY WITH BIOPSY performed by Joni Prescott MD at [address]  2012    EF 57%    ECHO COMPL W DOP COLOR FLOW  6/10/2013         EMBOLECTOMY N/A 2020    YULISSA Schreiber -- REQS ROOM 3 performed by Wilda Jay MD at [address] [facility] N/A 2020    LAPAROSCOPIC INSERTION PERITONEAL DIALYSIS CATHETER performed by Eduardo Dewey MD at [facility] 80 ESOPHAGOGASTRODUODENOSCOPY TRANSORAL DIAGNOSTIC N/A 2018    EGD ESOPHAGOGASTRODUODENOSCOPY performed by Claudene Reedy, MD at [address] TRANSESOPHAGEAL ECHOCARDIOGRAM N/A 10/19/2020    TRANSESOPHAGEAL ECHOCARDIOGRAM WITH BUBBLE STUDY performed by Lorraine Calles MD at [address] [facility] TUNNELED VENOUS PORT PLACEMENT  2018    UPPER GASTROINTESTINAL ENDOSCOPY  2018    EGD BIOPSY performed by Joni Prescott MD at [facility] N/A 10/11/2019    EGD ESOPHAGOGASTRODUODENOSCOPY performed by Sarai Beaver DO at [address] [place] [address] History   Problem Relation Age of Onset    Asthma Mother     Hypertension Mother     High Blood Pressure Mother     Diabetes Mother     Asthma Brother     High Blood Pressure Father         Social History     Tobacco Use    Smoking status: Current Every Day Smoker     Packs/day: 0.50     Years: 6.00     Pack years: 3.00     Types: Cigarettes    Smokeless tobacco: Current User   Substance Use Topics    Alcohol use: No        Prior to Admission medications    Medication Sig Start Date End Date Taking?  Authorizing Provider gabapentin (NEURONTIN) 100 MG capsule Take 200 mg by mouth 2 times daily. Yes Historical Provider, MD   Insulin Pump - insulin aspart (patient supplied) Inject into the skin continuous Insulin-to-Carb Ratio (ICR): **  Insulin Sensitivity Factor (ISF): ** mg/dL per unit of insulin  Target Blood Glucose: ** mg/dL  Bolus Frequency: **   Yes Historical Provider, MD   rOPINIRole (REQUIP) 0.25 MG tablet Take 0.25 mg by mouth nightly   Yes Historical Provider, MD   losartan (COZAAR) 50 MG tablet Take 50 mg by mouth daily   Yes Historical Provider, MD   metoprolol tartrate (LOPRESSOR) 25 MG tablet Take 25 mg by mouth 2 times daily   Yes Historical Provider, MD   bumetanide (BUMEX) 2 MG tablet Take 1 tablet by mouth 2 times daily New higher frequency 12/29/20  Yes Prabhu Hampton MD   Calcium Acetate, Phos Binder, 667 MG CAPS Take 1 capsule by mouth 3 times daily (with meals) 12/29/20  Yes Prabhu Hampton MD   dilTIAZem (CARDIZEM CD) 240 MG extended release capsule Take 1 capsule by mouth daily 12/15/20  Yes Daylin Juárez MD   mirtazapine (REMERON) 15 MG tablet Take 1 tablet by mouth nightly 12/15/20  Yes Daylin Juárez MD   metoclopramide (REGLAN) 5 MG tablet Take 5 mg by mouth 3 times daily    Yes Historical Provider, MD   levothyroxine (SYNTHROID) 75 MCG tablet Take 1 tablet by mouth every morning (before breakfast) 11/12/20  Yes Mariano Osborne MD   metOLazone (ZAROXOLYN) 5 MG tablet Take 5 mg by mouth daily   Yes Historical Provider, MD   atorvastatin (LIPITOR) 40 MG tablet Take 1 tablet by mouth nightly 9/3/20  Yes Daylin Juárez MD   blood glucose test strips (CONTOUR NEXT TEST) strip 1 each by In Vitro route 5 times daily As needed.  12/14/20   Milad Rex Lefort, MD   cloNIDine (CATAPRES) 0.1 MG tablet Take 0.1 mg by mouth 2 times daily as needed for High Blood Pressure    Historical Provider, MD      Allergies:  Cefepime and Toradol [ketorolac tromethamine]     Review of Systems A 10 point review of systems is negative unless otherwise specified in the HPI above. OBJECTIVE:      VITALS:  Vitals:    02/22/21 1100   BP: 116/88   Pulse: 109   Resp: 13   Temp:    SpO2: 96%        LABS:   Recent Labs     02/19/21  1422 02/19/21  2157 02/20/21  0508 02/21/21  0527 02/22/21  0513   WBC  --   --  17.3* 9.8 8.6   HGB 7.9* 7.9* 8.3* 7.7* 8.0*   HCT 23.9* 23.8* 24.3* 23.6* 24.6*   PLT  --   --  238 285 328       PHYSICAL EXAM:  Vascular:    DP pulses 2/4 and PT pulses 2/4 bilaterally. CFT < 3 seconds to all digits. None pitting edema present to left leg. Temperature gradient warm to warm from proximal distal left side. Neurologic:    Protective sensation is present in distal lower extremity bilaterally - verified using [place] touch test.    Dermatologic:    Open lesion are absent upon initial exam.  T unroofing of blister was performed with Dr. Hardwick Dryer bedside. Underlying area of granular tissue with serous drainage. No mj purulence or malodor. No probe to deeper structures. No tunneling or tracking. Hyperkeratotic lesion are absent  Erythema present around bullous lesions with purplish discoloration also noted. Erythema is blanchable. No notable fluctuance or crepitations. Musculoskeletal:   Pain on palpation of left leg. Muscle strength 5/5 to all lower extremity groups tested bilaterally. Active/passive ROM is WNL and not painful in all lower extremity joints tested. No pain on compression of posterior calves. Able to wiggle digits. Leg compartments feel firm to palpation.        IMAGING:  Ct Head Wo Contrast    Result Date: 2/15/2021 EXAMINATION: CT OF THE HEAD WITHOUT CONTRAST  2/15/2021 9:31 am TECHNIQUE: CT of the head was performed without the administration of intravenous contrast. Dose modulation, iterative reconstruction, and/or weight based adjustment of the mA/kV was utilized to reduce the radiation dose to as low as reasonably achievable. COMPARISON: Comparison is dated November 20 08/04/2020 HISTORY: ORDERING SYSTEM PROVIDED HISTORY: seizure, cardiac arrest TECHNOLOGIST PROVIDED HISTORY: Has a \"code stroke\" or \"stroke alert\" been called? ->No Reason for exam:->seizure, cardiac arrest FINDINGS: BRAIN/VENTRICLES: There is no acute intracranial hemorrhage, mass effect or midline shift. No abnormal extra-axial fluid collection. The gray-white differentiation is maintained without evidence of an acute infarct. There is no evidence of hydrocephalus. ORBITS: The visualized portion of the orbits demonstrate no acute abnormality. SINUSES: The visualized paranasal sinuses and mastoid air cells demonstrate no acute abnormality. SOFT TISSUES/SKULL:  No acute abnormality of the visualized skull or soft tissues. No acute intracranial abnormality. Xr Chest Portable    Result Date: 2/21/2021  EXAMINATION: ONE XRAY VIEW OF THE CHEST 2/21/2021 7:10 am COMPARISON: February 19, 2021 HISTORY: ORDERING SYSTEM PROVIDED HISTORY: RESP FAILURE TECHNOLOGIST PROVIDED HISTORY: Reason for exam:->RESP FAILURE FINDINGS: Trachea midline. Cardiac silhouette is stable in size. There are bilateral airspace opacities without interval change. No pneumothorax. No sizable pleural effusions. No change in bilateral airspace opacities.     Xr Chest Portable    Result Date: 2/19/2021 EXAMINATION: ONE XRAY VIEW OF THE CHEST 2/19/2021 6:33 am COMPARISON: 02/18/2021 HISTORY: ORDERING SYSTEM PROVIDED HISTORY: resp failure TECHNOLOGIST PROVIDED HISTORY: Reason for exam:->resp failure FINDINGS: The heart is enlarged. There is persistent bilateral hazy infiltration of the right and left lungs unchanged compared to prior study. There is a trace right pleural effusion. 1. No interval change in the multifocal bilateral diffuse hazy pulmonary infiltrates. Xr Chest Portable    Result Date: 2/18/2021  EXAMINATION: ONE XRAY VIEW OF THE CHEST 2/18/2021 10:27 pm COMPARISON: Chest series from the same day at 10:12 HISTORY: [facility]: hypoxia TECHNOLOGIST PROVIDED HISTORY: Reason for exam:->hypoxia FINDINGS: Symmetric low lung volumes. Persistent but perhaps slightly improved bilateral diffuse ill-defined opacities left slightly greater than right. No pleural effusion or pneumothorax definitely seen. Cardiac silhouette is enlarged. Difficult to assess pulmonary vascularity. Osseous and thoracic soft tissue structures demonstrate no acute findings. Persistent but perhaps slightly improved bilateral diffuse ill-defined opacities left slightly greater than right. RECOMMENDATION: Continued radiographic follow-up suggested. Xr Chest Portable    Result Date: 2/18/2021  EXAMINATION: ONE XRAY VIEW OF THE CHEST 2/18/2021 9:56 am COMPARISON: Chest radiograph February 17, 2021 HISTORY: ORDERING SYSTEM PROVIDED HISTORY: resp filure TECHNOLOGIST PROVIDED HISTORY: Reason for exam:->resp filure FINDINGS: Trachea is midline. Cardiomediastinal silhouette is stable in size. Interval improvement in right-sided airspace opacity with worsening of left-sided airspace opacities. No pneumothorax. No sizable pleural effusions. Improvement in right-sided opacities and worsening in left-sided airspace opacities. Finding may represent pulmonary edema versus atelectasis.     Xr Chest Portable Result Date: 2/17/2021  EXAMINATION: ONE XRAY VIEW OF THE CHEST 2/17/2021 12:39 pm COMPARISON: Comparison is dated February 15, 2021 HISTORY: ORDERING SYSTEM PROVIDED HISTORY: sob TECHNOLOGIST PROVIDED HISTORY: Reason for exam:->sob FINDINGS: ET tube is been removed There are extensive pulmonary primarily right-sided infiltrates with marked airspace consolidation. There left parahilar infiltrates. Cardiac silhouette is partially obscured. No pleural effusion seen. Extensive primarily right-sided pulmonary infiltrates new since the prior exam    Xr Chest Portable    Result Date: 2/15/2021  EXAMINATION: ONE SUPINE XRAY VIEW(S) OF THE ABDOMEN; ONE XRAY VIEW OF THE CHEST 2/15/2021 8:29 am COMPARISON: None. HISTORY: ORDERING SYSTEM PROVIDED HISTORY: Confirmation of course of NG/OG/NE tube and location of tip of tube TECHNOLOGIST PROVIDED HISTORY: Reason for exam:->Confirmation of course of NG/OG/NE tube and location of tip of tube Portable? ->Yes Abdomen film dated 11/05/2019 and portable chest dated 12/26/2020 FINDINGS: ABDOMEN: There is an NG tube with the tip in the distal stomach. There is mild distention of predominantly small bowel that could be due to ileus or bowel obstruction. Increased density is seen in the lateral aspect of the abdomen bilaterally suggesting the possibility of ascites. Surgical clips are seen in the right upper quadrant. No suspicious intra-abdominal calcifications are identified. The pelvis is excluded from the images. PORTABLE CHEST: There is an ET tube with the tip about 2 cm above the amanda. No acute infiltrate or pleural effusion is seen. The heart and mediastinum are not significantly changed and there is no evidence of vascular congestion. EXAMINATION: ONE SUPINE XRAY VIEW(S) OF THE ABDOMEN; ONE XRAY VIEW OF THE CHEST 2/15/2021 8:29 am COMPARISON: None. HISTORY: ORDERING SYSTEM PROVIDED HISTORY: Confirmation of course of NG/OG/NE tube and location of tip of tube TECHNOLOGIST PROVIDED HISTORY: Reason for exam:->Confirmation of course of NG/OG/NE tube and location of tip of tube Portable? ->Yes Abdomen film dated 11/05/2019 and portable chest dated 12/26/2020 FINDINGS: ABDOMEN: There is an NG tube with the tip in the distal stomach. There is mild distention of predominantly small bowel that could be due to ileus or bowel obstruction. Increased density is seen in the lateral aspect of the abdomen bilaterally suggesting the possibility of ascites. Surgical clips are seen in the right upper quadrant. No suspicious intra-abdominal calcifications are identified. The pelvis is excluded from the images. PORTABLE CHEST: There is an ET tube with the tip about 2 cm above the amanda. No acute infiltrate or pleural effusion is seen. The heart and mediastinum are not significantly changed and there is no evidence of vascular congestion. 1. ET tube in satisfactory position. No evidence of acute intrathoracic disease. 2. NG tube tip in the distal stomach. 3. Nonspecific small bowel distension that could be related to ileus or small-bowel obstruction. 4. Suspected ascites. ASSESEMENT:  1. Left leg bullous lesions - clinical signs of infection, possible underlying infectious process  2. Left leg cellulitis  3. Left leg edema  4.  Type I diabetes, uncontrolled    Principal Problem:    Cardiac arrest ([address]    Active Problems:    Diabetic ketoacidosis with coma associated with type 1 diabetes mellitus ([address]    UTI (urinary tract infection)    Diabetes mellitus type 1, uncontrolled ([address]    Sepsis ([address]    Hypertension    Acute respiratory failure with hypoxia ([address]    ESRD on peritoneal dialysis ([address]    Shock liver    Bacterial pneumonia       PLAN: -Patient evaluated and chart reviewed - which includes all relevant imaging, labs, and ancillary notes. -Ordered XR of left leg. May then get CT/MRI as needed for further eval of ST's.  -Cx taken and sent. -ESR/CRP/procal/aso ordered. -Recommend ID consult. -Dressings: xeroform DSD. -Plans for surgery: Will take to OR tomorrow for \"Left leg I&D\". Time pending (either morning or noon).  NPO placed, consent entered.  -Will continue to follow while in hospital.    DW: Malinda Christianson DPM FACFAS  Fellowship-Trained Foot and Ankle Surgeon  Diplomate, [employer] Board of Foot and Ankle Surgeons  [phone]

## 2021-02-22 NOTE — PROGRESS NOTES
[address] [place] [address] Hospitalist   Progress Note    Admitting Date and Time: 2/15/2021  8:07 AM  Admit Dx: Cardiac arrest [facility]) [I46.9]    Subjective/interval history:    2/16: Patient was admitted yesterday afternoon with PEA cardiac arrest, sepsis due to urinary tract infection, hyperkalemia in setting of end-stage renal disease on peritoneal dialysis. This morning she is intubated, but awake, alert, appears anxious. 2/17: Patient was extubated yesterday afternoon. She is awake, alert, oriented x3, neurologically intact. Very anxious appearing. States she has upper and lower back pain. 2/18: Patient awake, alert, somewhat somnolent, awakens easily to voice, but appears anxious when awake. She has pain all over and has generalized weakness. 2/19: Overnight, patient was seen for altered mental status and hypoxia. She was minimally responsive to sternal rub, but was protecting her airway. She was on nonrebreather mask, now on heated high flow nasal cannula oxygen. ABG did not show any significant hypercapnia. This morning, she is awake, alert, but complaining of pain all over. She is very tearful. I discussed with her that given her respiratory failure, and recent cardiac arrest, escalating pain medications would cause potential complications clean respiratory depression. 2/20: Patient awake, alert, states her pain has improved. Currently on heated high flow nasal cannula with FiO2 down to 70%. 2/21: Patient states she feels overall better today, but still has significant chest pain from chest compressions. Off of heated high flow nasal cannula oxygen, now on 8 L high flow nasal cannula oxygen. 2/22: Patient getting Echo at bedside. States she feels she needs something stronger for pain \"maybe something IV\". Per RN: quickly desaturates once NC is displaced. She is 98% on 6L.      metoprolol tartrate  25 mg Oral BID    Vitamin D  1,000 Units Oral Daily  vitamin D  50,000 Units Oral Weekly    insulin glargine  6 Units Subcutaneous Nightly    pantoprazole  40 mg Oral QAM AC    Calcium Acetate (Phos Binder)  1,334 mg Oral TID WC    meropenem  500 mg Intravenous Q24H    insulin lispro  0-12 Units Subcutaneous 4x Daily AC & HS    sodium chloride (PF)  10 mL Intravenous Daily    ipratropium-albuterol  1 ampule Inhalation Q6H    epoetin nena-epbx  3,000 Units Subcutaneous Once per day on Mon Wed Fri    And    epoetin nena-epbx  2,000 Units Subcutaneous Once per day on Mon Wed Fri    levothyroxine  75 mcg Per NG tube QAM AC    sodium chloride flush  10 mL Intravenous 2 times per day    heparin (porcine)  5,000 Units Subcutaneous 3 times per day    vancomycin (VANCOCIN) intermittent dosing (placeholder)   Other RX Placeholder         fentanNYL, 12.5 mcg, Q8H PRN      perflutren lipid microspheres, 1.5 mL, ONCE PRN      oxyCODONE-acetaminophen, 1 tablet, Q4H PRN      sodium chloride, , PRN      potassium chloride, 20 mEq, PRN      glucose, 15 g, PRN      dextrose, 12.5 g, PRN      glucagon (rDNA), 1 mg, PRN      dextrose, 100 mL/hr, PRN      sodium chloride flush, 10 mL, PRN      polyethylene glycol, 17 g, Daily PRN      acetaminophen, 650 mg, Q6H PRN    Or      acetaminophen, 650 mg, Q6H PRN         Objective:    /73   Pulse 110   Temp 99.4 °F (37.4 °C) (Axillary)   Resp 19   Ht 5' 4\" (1.626 m)   Wt 160 lb 15 oz (73 kg)   SpO2 100%   BMI 27.62 kg/m²   General Appearance: Alert and oriented x3, does not appear to be in any distress this morning   skin: warm and dry  Head: normocephalic and atraumatic  Eyes: pupils equal, round, and reactive to light, extraocular eye movements intact, conjunctivae normal  Neck: neck supple and non tender without mass   Pulmonary/Chest: Nonlabored.   Diminished on the left, clear to auscultation on the right  Cardiovascular: normal rate, normal S1 and S2 and no carotid bruits Abdomen: soft, non-distended, normal bowel sounds, no masses or organomegaly  Extremities: Left leg with 1+ edema, as well as a large bulla on the shin  Neurologic: Cranial nerves II through XII grossly intact, speech normal      Recent Labs     02/20/21  0508 02/21/21  0527 02/22/21 0513    142 139   K 4.1 3.7 3.7    100 98   CO2 23 23 24   BUN 49* 48* 50*   CREATININE 8.2* 8.1* 8.5*   GLUCOSE 130* 242* 173*   CALCIUM 8.1* 8.4* 8.8       Recent Labs     02/20/21  0508 02/21/21  0527 02/22/21 0513   ALKPHOS 240* 228* 241*   PROT 5.7* 5.8* 5.9*   LABALBU 2.6* 2.7* 2.6*   BILITOT 0.3 0.3 0.3   AST 26 16 17   ALT 88* 53* 38*       Recent Labs     02/20/21  0508 02/21/21 0527 02/22/21 0513   WBC 17.3* 9.8 8.6   RBC 2.79* 2.66* 2.74*   HGB 8.3* 7.7* 8.0*   HCT 24.3* 23.6* 24.6*   MCV 87.1 88.7 89.8   MCH 29.7 28.9 29.2   MCHC 34.2 32.6 32.5   RDW 16.1* 16.4* 16.1*    285 328   MPV 10.7 10.9 10.4       CBC:   Lab Results   Component Value Date    WBC 8.6 02/22/2021    RBC 2.74 02/22/2021    HGB 8.0 02/22/2021    HCT 24.6 02/22/2021    MCV 89.8 02/22/2021    MCH 29.2 02/22/2021    MCHC 32.5 02/22/2021    RDW 16.1 02/22/2021     02/22/2021    MPV 10.4 02/22/2021     CMP:    Lab Results   Component Value Date     02/22/2021    K 3.7 02/22/2021    K 5.4 02/15/2021    CL 98 02/22/2021    CO2 24 02/22/2021    BUN 50 02/22/2021    CREATININE 8.5 02/22/2021    GFRAA 7 02/22/2021    LABGLOM 7 02/22/2021    GLUCOSE 173 02/22/2021    GLUCOSE 130 05/18/2012    PROT 5.9 02/22/2021    LABALBU 2.6 02/22/2021    LABALBU 4.1 05/18/2012    CALCIUM 8.8 02/22/2021    BILITOT 0.3 02/22/2021    ALKPHOS 241 02/22/2021    AST 17 02/22/2021    ALT 38 02/22/2021        Radiology:   XR CHEST PORTABLE   Final Result   No change in bilateral airspace opacities. US DUP LOWER EXTREMITY LEFT RODO   Final Result   No evidence of DVT in the left lower extremity.       XR CHEST PORTABLE   Final Result 1. No interval change in the multifocal bilateral diffuse hazy pulmonary   infiltrates. XR CHEST PORTABLE   Final Result   Persistent but perhaps slightly improved bilateral diffuse ill-defined   opacities left slightly greater than right. RECOMMENDATION:   Continued radiographic follow-up suggested. XR CHEST PORTABLE   Final Result   Improvement in right-sided opacities and worsening in left-sided airspace   opacities. Finding may represent pulmonary edema versus atelectasis. XR CHEST PORTABLE   Final Result   Extensive primarily right-sided pulmonary infiltrates new since the prior exam      CT HEAD WO CONTRAST   Final Result   No acute intracranial abnormality. XR ABDOMEN FOR NG/OG/NE TUBE PLACEMENT   Final Result   1. ET tube in satisfactory position. No evidence of acute intrathoracic   disease. 2. NG tube tip in the distal stomach. 3. Nonspecific small bowel distension that could be related to ileus or   small-bowel obstruction. 4. Suspected ascites. XR CHEST PORTABLE   Final Result   1. ET tube in satisfactory position. No evidence of acute intrathoracic   disease. 2. NG tube tip in the distal stomach. 3. Nonspecific small bowel distension that could be related to ileus or   small-bowel obstruction. 4. Suspected ascites. Assessment/Plan:  Principal Problem:    Cardiac arrest [facility])  Active Problems:    ESRD on peritoneal dialysis ([address]    Hypertension    Diabetic ketoacidosis with coma associated with type 1 diabetes mellitus ([address]    UTI (urinary tract infection)    Diabetes mellitus type 1, uncontrolled ([address]    Sepsis ([address]    Acute respiratory failure with hypoxia ([address]    Shock liver    Bacterial pneumonia  Resolved Problems:    * No resolved hospital problems. *      1.  PEA cardiac arrest with successful resuscitation -Possibly due to arrhythmia from electrolyte abnormalities. She was only slightly hyperkalemic on blood chemistries, however this was after she was given treatment for probable hyperkalemia based on EKG findings  -Monitor electrolytes and correct as necessary  -Treating potential underlying causes including electrolyte abnormalities, DKA, sepsis    2. Acute hypoxic respiratory failure due to bacterial pneumonia  -Antibiotics as noted below  -Improving  -On 8 L high flow nasal cannula oxygen  -Basal and corrective scale insulin for now. Plan to resume insulin pump at discharge     3. DKA in the setting of uncontrolled type I DM  -Off insulin drip, now on subcutaneous insulin    4. Sepsis due to UTI/right-sided pneumonia  -Continue vancomycin, and meropenem day #7 total of antibiotics. -Blood cultures drawn on 2/15 and repeat cultures on 2/18 showing no growth to date. 5.  Hyperkalemia  -Resolved  -Continue peritoneal dialysis, nephrology following     6. ESRD on peritoneal dialysis  -Nephrology following for dialysis management     8. Shock liver  -Transaminases continue to improve, bilirubin normal. AST 17 and ALT 38    9. Left leg bullae with surrounding erythema  -will ask podiatry to evaluate. 10. Essential HTN  -Metoprolol tartrate resumed 2/17 her blood pressure has been well controlled     Code Status: Full code  DVT prophylaxis: Subcutaneous heparin    NOTE: This report was transcribed using voice recognition software. Every effort was made to ensure accuracy; however, inadvertent computerized transcription errors may be present.      Electronically signed by Mari Bar MD on 2/22/2021 at 9:16 AM

## 2021-02-22 NOTE — PROGRESS NOTES
Department of Internal Medicine  Nephrology Progress Note    Events reviewed. SUBJECTIVE:  We are following Wilton Flores for ESRD on Peritoneal Dialysis. Reports generalized muscle aches.  She reports she is feeling\" a little better today\" Case discussed with RN.    PHYSICAL EXAM:      Vitals:    VITALS:  /88   Pulse 110   Temp 97.3 °F (36.3 °C) (Infrared)   Resp 14   Ht 5' 4\" (1.626 m)   Wt 160 lb 15 oz (73 kg)   SpO2 94%   BMI 27.62 kg/m²   24HR INTAKE/OUTPUT:      Intake/Output Summary (Last 24 hours) at 2/22/2021 1800  Last data filed at 2/22/2021 0900  Gross per 24 hour   Intake    Output 1903 ml   Net -1903 ml     PD Catheter Exam:  PD catheter exit site clean    Constitutional: Awake, chronically ill  HEENT:  Normocephalic, PERRL  Respiratory: Decreased breath sounds on the basis  Cardiovascular/Edema:  RRR, S1/S2  Gastrointestinal:  Soft, PD catheter exit site clean   Neurologic:  Nonfocal, AVELAR  Skin:  Warm, dry, no lesions  Other:  +edema    Scheduled Meds:   metoprolol tartrate  25 mg Oral BID    Vitamin D  1,000 Units Oral Daily    vitamin D  50,000 Units Oral Weekly    insulin glargine  6 Units Subcutaneous Nightly    pantoprazole  40 mg Oral QAM AC    Calcium Acetate (Phos Binder)  1,334 mg Oral TID WC    meropenem  500 mg Intravenous Q24H    insulin lispro  0-12 Units Subcutaneous 4x Daily AC & HS    sodium chloride (PF)  10 mL Intravenous Daily    ipratropium-albuterol  1 ampule Inhalation Q6H    epoetin nena-epbx  3,000 Units Subcutaneous Once per day on Mon Wed Fri    And    epoetin nena-epbx  2,000 Units Subcutaneous Once per day on Mon Wed Fri    levothyroxine  75 mcg Per NG tube QAM AC    sodium chloride flush  10 mL Intravenous 2 times per day    heparin (porcine)  5,000 Units Subcutaneous 3 times per day    vancomycin (VANCOCIN) intermittent dosing (placeholder)   Other RX Placeholder     Continuous Infusions:   sodium chloride  dextrose Stopped (02/16/21 1400)     PRN Meds:.oxyCODONE-acetaminophen **OR** oxyCODONE-acetaminophen, fentanNYL, perflutren lipid microspheres, sodium chloride, potassium chloride, glucose, dextrose, glucagon (rDNA), dextrose, sodium chloride flush, polyethylene glycol, acetaminophen **OR** acetaminophen    DATA:    CBC:   Lab Results   Component Value Date    WBC 8.6 02/22/2021    RBC 2.74 02/22/2021    HGB 8.0 02/22/2021    HCT 24.6 02/22/2021    MCV 89.8 02/22/2021    MCH 29.2 02/22/2021    MCHC 32.5 02/22/2021    RDW 16.1 02/22/2021     02/22/2021    MPV 10.4 02/22/2021     CMP:    Lab Results   Component Value Date     02/22/2021    K 3.7 02/22/2021    K 5.4 02/15/2021    CL 98 02/22/2021    CO2 24 02/22/2021    BUN 50 02/22/2021    CREATININE 8.5 02/22/2021    GFRAA 7 02/22/2021    LABGLOM 7 02/22/2021    GLUCOSE 173 02/22/2021    GLUCOSE 130 05/18/2012    PROT 5.9 02/22/2021    LABALBU 2.6 02/22/2021    LABALBU 4.1 05/18/2012    CALCIUM 8.8 02/22/2021    BILITOT 0.3 02/22/2021    ALKPHOS 241 02/22/2021    AST 17 02/22/2021    ALT 38 02/22/2021     Magnesium:    Lab Results   Component Value Date    MG 1.9 02/21/2021     Phosphorus:    Lab Results   Component Value Date    PHOS 8.3 02/22/2021     Radiology Review:      Chest x-ray February 15, 2021   1. ET tube in satisfactory position.  No evidence of acute intrathoracic   disease. 2. NG tube tip in the distal stomach. 3. Nonspecific small bowel distension that could be related to ileus or   small-bowel obstruction. 4. Suspected ascites.        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  She was admitted to MICU with a diagnosis of DKA and status post cardiac arrest, she was intubated. Patient was extubated earlier today. Problems resolved:    · Respiratory failure, status post intubation, extubated   · Type I DM, DKA, anion gap and bicarbonate levels improved, off insulin drip  · S/p PEA cardiac arrest      IMPRESSION/RECOMMENDATIONS:      1. ESRD on peritoneal dialysis, to continue CCPD Total UF 1903 mL,  drainage clear pale yellow  2. Probably sepsis, on meropenem and vancomycin. WBCs 20.4>>>17.5>>>17.3. .Max temp 101.5, blood cultures no growth 2/18  3. MBD of CKD, calcium acetate 667 g  2 tablets 3 times daily. Phos  9.4>>>10   4. Anemia of CKD, continue epoetin alpha 5000 units 3 times a week. H/H 8.3/24.3  5. Transaminitis improving, trending downward ALT 88, AST 26   6. Malnutrition, protein 5.7, albumin 2.6, Renal supplements BID  7. Vit D deficiency-Results for Seda James (MRN [id number]) as of 2/21/2021 16:05   Ref. Range 2/21/2021 05:27   Vit D, 25-Hydroxy Latest Ref Range: 30 - 100 ng/mL 7 (L)       Plan:    · Continue CCPD prescription  2.5% all exchanges  · Start ergo 12197 units weekly  · SPA 25 gm iv daily x 3  · Check 2 dm echo  · Continue to monitor labs  · Check Vitamin D level in am  · Magnesium level in am  · Await podiatry consult   .

## 2021-02-22 NOTE — FLOWSHEET NOTE
02/22/21 0900   Vitals   /73   Pulse 110   Resp 19   SpO2 100 %   Cycler   Ultrafiltration (UF) (mL) 1903 mL   Average Dwell Time (Hours:Minutes) 30 minutes   CCPD tx completed at this time. Total U/F 1903. Pt removed from cycler utilizing aseptic practice. Effluent fluid translucent pale yellow. Dressing dry and intact to PD cath exit site. No distress.

## 2021-02-22 NOTE — CARE COORDINATION
2/22/21 Negative covid 2/15/21. Cm transition of care: met with patient in icu- sleeping not waking up for conversation with CM. [facility]- versus [facility]- pt on capd- mother reviewing discharge planning options. May need to consider LTACH. Pt has 24/7 assistance at home. Pt for surgery 2/23/21 lower extremity blisters. CM following.  Electronically signed by TOR Miller on 2/22/2021 at 2:53 PM

## 2021-02-22 NOTE — PROGRESS NOTES
Pharmacy Consultation Note  (Antibiotic Dosing and Monitoring)    Initial consult date: 2/15/2021  Consulting physician: Dr. Sarita Monk  Drug(s): Vancomycin  Indication: Sepsis    Ht Readings from Last 1 Encounters:   02/19/21 5' 4\" (1.626 m)     Wt Readings from Last 1 Encounters:   02/20/21 160 lb 15 oz (73 kg)     Age/  Gender IBW DW  Allergy Information   31 y.o.  female 54.7 kg 64.3 kg  Cefepime and Toradol [ketorolac tromethamine]         Date  Tmax WBC Dialysis Drug/Dose Time   Given Level(s)   (Time) Comments   2/15  (#1) -- 8.1 PD Vancomycin 1500 mg IV x 1 1745     2/16  (#2) 99.4 14.2 PD No vancomycin -- Random level @ 0845 = 27.1 mcg/mL    2/17  (#3) 102 20 PD No vancomycin --     2/18  (#4) 102.9 20.4 PD No vancomycin -- Random level @ 0522 = 19.8 mcg/mL    2/19  (#5) 99.9 17.5 PD Vancomycin 1000 mg IV x 1 1042     2/20  (#6) 101.1 17.3 PD No vancomycin --     2/21  (#7) 99.5 9.8 PD No vancomycin -- Random level @0527 = 31.3 mcg/mL    2/22  (#8) 99.4 8.6 PD No vancomycin --                                     Estimated Creatinine Clearance: 10 mL/min (A) (based on SCr of 8.5 mg/dL (HH)). UOP over the past 24 hours:       Intake/Output Summary (Last 24 hours) at 2/22/2021 1018  Last data filed at 2/21/2021 1122  Gross per 24 hour   Intake 100 ml   Output    Net 100 ml       Anti-infective Regimen:  Anti-infective Dose Date Initiated Date Stopped   Ceftriaxone 1000 mg IV q24hr 2/15 2/17   Pip/tazo 3.375g IV q12hr 2/17 2/18   Meropenem 500 mg q24hr 2/18      Cultures:  available culture and sensitivity results were reviewed in EPIC  Cultures sent and are pending.   Culture Date Result    Blood cx 1 2/15 NGTD   Blood cx 2 2/15 NGTD   Respiratory cx 2/18 Serratia marcescens  Candida albicans  Few GP Diplococci               Assessment:  · Consulted by Dr. Sarita Monk to dose/monitor vancomycin  · Goal trough level:  15-20 mcg/mL, AUC/DEENA:   400-600

## 2021-02-23 ENCOUNTER — ANESTHESIA (OUTPATIENT)
Dept: OPERATING ROOM | Age: 31
DRG: 710 | End: 2021-02-23
Payer: COMMERCIAL

## 2021-02-23 ENCOUNTER — APPOINTMENT (OUTPATIENT)
Dept: GENERAL RADIOLOGY | Age: 31
DRG: 710 | End: 2021-02-23
Payer: COMMERCIAL

## 2021-02-23 VITALS
OXYGEN SATURATION: 100 % | DIASTOLIC BLOOD PRESSURE: 79 MMHG | RESPIRATION RATE: 12 BRPM | SYSTOLIC BLOOD PRESSURE: 118 MMHG

## 2021-02-23 LAB
ALBUMIN SERPL-MCNC: 2.7 G/DL (ref 3.5–5.2)
ALP BLD-CCNC: 259 U/L (ref 35–104)
ALT SERPL-CCNC: 26 U/L (ref 0–32)
ANION GAP SERPL CALCULATED.3IONS-SCNC: 15 MMOL/L (ref 7–16)
ANION GAP SERPL CALCULATED.3IONS-SCNC: 16 MMOL/L (ref 7–16)
ANION GAP SERPL CALCULATED.3IONS-SCNC: 16 MMOL/L (ref 7–16)
ANION GAP SERPL CALCULATED.3IONS-SCNC: 17 MMOL/L (ref 7–16)
AST SERPL-CCNC: 14 U/L (ref 0–31)
BILIRUB SERPL-MCNC: <0.2 MG/DL (ref 0–1.2)
BUN BLDV-MCNC: 46 MG/DL (ref 6–20)
BUN BLDV-MCNC: 47 MG/DL (ref 6–20)
BUN BLDV-MCNC: 47 MG/DL (ref 6–20)
BUN BLDV-MCNC: 48 MG/DL (ref 6–20)
CALCIUM SERPL-MCNC: 8.4 MG/DL (ref 8.6–10.2)
CALCIUM SERPL-MCNC: 8.5 MG/DL (ref 8.6–10.2)
CALCIUM SERPL-MCNC: 9 MG/DL (ref 8.6–10.2)
CALCIUM SERPL-MCNC: 9.1 MG/DL (ref 8.6–10.2)
CHLORIDE BLD-SCNC: 100 MMOL/L (ref 98–107)
CHLORIDE BLD-SCNC: 96 MMOL/L (ref 98–107)
CHLORIDE BLD-SCNC: 98 MMOL/L (ref 98–107)
CHLORIDE BLD-SCNC: 99 MMOL/L (ref 98–107)
CO2: 22 MMOL/L (ref 22–29)
CO2: 24 MMOL/L (ref 22–29)
CO2: 26 MMOL/L (ref 22–29)
CO2: 26 MMOL/L (ref 22–29)
CREAT SERPL-MCNC: 8.6 MG/DL (ref 0.5–1)
CREAT SERPL-MCNC: 8.6 MG/DL (ref 0.5–1)
CREAT SERPL-MCNC: 8.7 MG/DL (ref 0.5–1)
CREAT SERPL-MCNC: 8.8 MG/DL (ref 0.5–1)
GFR AFRICAN AMERICAN: 6
GFR AFRICAN AMERICAN: 7
GFR NON-AFRICAN AMERICAN: 6 ML/MIN/1.73
GFR NON-AFRICAN AMERICAN: 7 ML/MIN/1.73
GLUCOSE BLD-MCNC: 113 MG/DL (ref 74–99)
GLUCOSE BLD-MCNC: 138 MG/DL (ref 74–99)
GLUCOSE BLD-MCNC: 170 MG/DL (ref 74–99)
GLUCOSE BLD-MCNC: 270 MG/DL (ref 74–99)
GRAM STAIN ORDERABLE: NORMAL
HCT VFR BLD CALC: 25.4 % (ref 34–48)
HEMOGLOBIN: 8 G/DL (ref 11.5–15.5)
MAGNESIUM: 1.9 MG/DL (ref 1.6–2.6)
MAGNESIUM: 1.9 MG/DL (ref 1.6–2.6)
MAGNESIUM: 2 MG/DL (ref 1.6–2.6)
MCH RBC QN AUTO: 29.4 PG (ref 26–35)
MCHC RBC AUTO-ENTMCNC: 31.5 % (ref 32–34.5)
MCV RBC AUTO: 93.4 FL (ref 80–99.9)
METER GLUCOSE: 112 MG/DL (ref 74–99)
METER GLUCOSE: 120 MG/DL (ref 74–99)
METER GLUCOSE: 124 MG/DL (ref 74–99)
METER GLUCOSE: 135 MG/DL (ref 74–99)
METER GLUCOSE: 136 MG/DL (ref 74–99)
METER GLUCOSE: 179 MG/DL (ref 74–99)
METER GLUCOSE: 180 MG/DL (ref 74–99)
METER GLUCOSE: 218 MG/DL (ref 74–99)
METER GLUCOSE: 232 MG/DL (ref 74–99)
METER GLUCOSE: 255 MG/DL (ref 74–99)
METER GLUCOSE: 259 MG/DL (ref 74–99)
METER GLUCOSE: 291 MG/DL (ref 74–99)
METER GLUCOSE: 66 MG/DL (ref 74–99)
METER GLUCOSE: 87 MG/DL (ref 74–99)
METER GLUCOSE: 95 MG/DL (ref 74–99)
PDW BLD-RTO: 16.6 FL (ref 11.5–15)
PHOSPHORUS: 5.8 MG/DL (ref 2.5–4.5)
PHOSPHORUS: 6.8 MG/DL (ref 2.5–4.5)
PHOSPHORUS: 7.1 MG/DL (ref 2.5–4.5)
PHOSPHORUS: 8.2 MG/DL (ref 2.5–4.5)
PLATELET # BLD: 366 E9/L (ref 130–450)
PMV BLD AUTO: 10.4 FL (ref 7–12)
POTASSIUM SERPL-SCNC: 3.3 MMOL/L (ref 3.5–5)
POTASSIUM SERPL-SCNC: 3.7 MMOL/L (ref 3.5–5)
POTASSIUM SERPL-SCNC: 3.8 MMOL/L (ref 3.5–5)
POTASSIUM SERPL-SCNC: 4 MMOL/L (ref 3.5–5)
RBC # BLD: 2.72 E12/L (ref 3.5–5.5)
SODIUM BLD-SCNC: 137 MMOL/L (ref 132–146)
SODIUM BLD-SCNC: 138 MMOL/L (ref 132–146)
SODIUM BLD-SCNC: 139 MMOL/L (ref 132–146)
SODIUM BLD-SCNC: 141 MMOL/L (ref 132–146)
TOTAL PROTEIN: 5.8 G/DL (ref 6.4–8.3)
WBC # BLD: 8 E9/L (ref 4.5–11.5)

## 2021-02-23 PROCEDURE — 99233 SBSQ HOSP IP/OBS HIGH 50: CPT | Performed by: INTERNAL MEDICINE

## 2021-02-23 PROCEDURE — 82962 GLUCOSE BLOOD TEST: CPT

## 2021-02-23 PROCEDURE — 2580000003 HC RX 258: Performed by: INTERNAL MEDICINE

## 2021-02-23 PROCEDURE — 2500000003 HC RX 250 WO HCPCS: Performed by: NURSE ANESTHETIST, CERTIFIED REGISTERED

## 2021-02-23 PROCEDURE — 6370000000 HC RX 637 (ALT 250 FOR IP): Performed by: INTERNAL MEDICINE

## 2021-02-23 PROCEDURE — 2000000000 HC ICU R&B

## 2021-02-23 PROCEDURE — 3600000002 HC SURGERY LEVEL 2 BASE: Performed by: PODIATRIST

## 2021-02-23 PROCEDURE — 6360000002 HC RX W HCPCS: Performed by: INTERNAL MEDICINE

## 2021-02-23 PROCEDURE — 87205 SMEAR GRAM STAIN: CPT

## 2021-02-23 PROCEDURE — 87075 CULTR BACTERIA EXCEPT BLOOD: CPT

## 2021-02-23 PROCEDURE — 90945 DIALYSIS ONE EVALUATION: CPT

## 2021-02-23 PROCEDURE — 94640 AIRWAY INHALATION TREATMENT: CPT

## 2021-02-23 PROCEDURE — 2500000003 HC RX 250 WO HCPCS: Performed by: PODIATRIST

## 2021-02-23 PROCEDURE — 3700000000 HC ANESTHESIA ATTENDED CARE: Performed by: PODIATRIST

## 2021-02-23 PROCEDURE — 3700000001 HC ADD 15 MINUTES (ANESTHESIA): Performed by: PODIATRIST

## 2021-02-23 PROCEDURE — 97110 THERAPEUTIC EXERCISES: CPT

## 2021-02-23 PROCEDURE — 0KBT0ZZ EXCISION OF LEFT LOWER LEG MUSCLE, OPEN APPROACH: ICD-10-PCS | Performed by: PODIATRIST

## 2021-02-23 PROCEDURE — 87070 CULTURE OTHR SPECIMN AEROBIC: CPT

## 2021-02-23 PROCEDURE — 85027 COMPLETE CBC AUTOMATED: CPT

## 2021-02-23 PROCEDURE — 2709999900 HC NON-CHARGEABLE SUPPLY: Performed by: PODIATRIST

## 2021-02-23 PROCEDURE — 36415 COLL VENOUS BLD VENIPUNCTURE: CPT

## 2021-02-23 PROCEDURE — 2580000003 HC RX 258: Performed by: NURSE ANESTHETIST, CERTIFIED REGISTERED

## 2021-02-23 PROCEDURE — 3600000012 HC SURGERY LEVEL 2 ADDTL 15MIN: Performed by: PODIATRIST

## 2021-02-23 PROCEDURE — 6360000002 HC RX W HCPCS: Performed by: NURSE ANESTHETIST, CERTIFIED REGISTERED

## 2021-02-23 PROCEDURE — 80053 COMPREHEN METABOLIC PANEL: CPT

## 2021-02-23 PROCEDURE — 6360000002 HC RX W HCPCS: Performed by: ORTHOPAEDIC SURGERY

## 2021-02-23 PROCEDURE — 71045 X-RAY EXAM CHEST 1 VIEW: CPT

## 2021-02-23 PROCEDURE — 2700000000 HC OXYGEN THERAPY PER DAY

## 2021-02-23 PROCEDURE — 83735 ASSAY OF MAGNESIUM: CPT

## 2021-02-23 PROCEDURE — 88304 TISSUE EXAM BY PATHOLOGIST: CPT

## 2021-02-23 PROCEDURE — 80048 BASIC METABOLIC PNL TOTAL CA: CPT

## 2021-02-23 PROCEDURE — 2580000003 HC RX 258: Performed by: ORTHOPAEDIC SURGERY

## 2021-02-23 PROCEDURE — 84100 ASSAY OF PHOSPHORUS: CPT

## 2021-02-23 RX ORDER — PROPOFOL 10 MG/ML
INJECTION, EMULSION INTRAVENOUS PRN
Status: DISCONTINUED | OUTPATIENT
Start: 2021-02-23 | End: 2021-02-23 | Stop reason: SDUPTHER

## 2021-02-23 RX ORDER — DEXTROSE AND SODIUM CHLORIDE 5; .45 G/100ML; G/100ML
INJECTION, SOLUTION INTRAVENOUS CONTINUOUS PRN
Status: DISCONTINUED | OUTPATIENT
Start: 2021-02-23 | End: 2021-02-24

## 2021-02-23 RX ORDER — DEXTROSE MONOHYDRATE 25 G/50ML
12.5 INJECTION, SOLUTION INTRAVENOUS PRN
Status: DISCONTINUED | OUTPATIENT
Start: 2021-02-23 | End: 2021-02-27 | Stop reason: HOSPADM

## 2021-02-23 RX ORDER — SODIUM CHLORIDE 9 MG/ML
INJECTION, SOLUTION INTRAVENOUS CONTINUOUS PRN
Status: DISCONTINUED | OUTPATIENT
Start: 2021-02-23 | End: 2021-02-23 | Stop reason: SDUPTHER

## 2021-02-23 RX ORDER — 0.9 % SODIUM CHLORIDE 0.9 %
15 INTRAVENOUS SOLUTION INTRAVENOUS ONCE
Status: DISCONTINUED | OUTPATIENT
Start: 2021-02-23 | End: 2021-02-27 | Stop reason: HOSPADM

## 2021-02-23 RX ORDER — EPHEDRINE SULFATE/0.9% NACL/PF 50 MG/5 ML
SYRINGE (ML) INTRAVENOUS PRN
Status: DISCONTINUED | OUTPATIENT
Start: 2021-02-23 | End: 2021-02-23 | Stop reason: SDUPTHER

## 2021-02-23 RX ORDER — SODIUM CHLORIDE 9 MG/ML
INJECTION, SOLUTION INTRAVENOUS CONTINUOUS
Status: DISCONTINUED | OUTPATIENT
Start: 2021-02-23 | End: 2021-02-23

## 2021-02-23 RX ORDER — MIDAZOLAM HYDROCHLORIDE 1 MG/ML
INJECTION INTRAMUSCULAR; INTRAVENOUS PRN
Status: DISCONTINUED | OUTPATIENT
Start: 2021-02-23 | End: 2021-02-23 | Stop reason: SDUPTHER

## 2021-02-23 RX ORDER — LIDOCAINE HYDROCHLORIDE 20 MG/ML
INJECTION, SOLUTION INTRAVENOUS PRN
Status: DISCONTINUED | OUTPATIENT
Start: 2021-02-23 | End: 2021-02-23 | Stop reason: SDUPTHER

## 2021-02-23 RX ORDER — BUPIVACAINE HYDROCHLORIDE 5 MG/ML
INJECTION, SOLUTION EPIDURAL; INTRACAUDAL PRN
Status: DISCONTINUED | OUTPATIENT
Start: 2021-02-23 | End: 2021-02-23 | Stop reason: HOSPADM

## 2021-02-23 RX ORDER — FENTANYL CITRATE 50 UG/ML
INJECTION, SOLUTION INTRAMUSCULAR; INTRAVENOUS PRN
Status: DISCONTINUED | OUTPATIENT
Start: 2021-02-23 | End: 2021-02-23 | Stop reason: SDUPTHER

## 2021-02-23 RX ORDER — DEXTROSE AND SODIUM CHLORIDE 5; .45 G/100ML; G/100ML
INJECTION, SOLUTION INTRAVENOUS CONTINUOUS PRN
Status: DISCONTINUED | OUTPATIENT
Start: 2021-02-23 | End: 2021-02-23 | Stop reason: ALTCHOICE

## 2021-02-23 RX ORDER — SODIUM CHLORIDE 9 MG/ML
INJECTION, SOLUTION INTRAVENOUS CONTINUOUS
Status: DISCONTINUED | OUTPATIENT
Start: 2021-02-23 | End: 2021-02-27 | Stop reason: HOSPADM

## 2021-02-23 RX ORDER — MAGNESIUM SULFATE 1 G/100ML
1000 INJECTION INTRAVENOUS PRN
Status: DISCONTINUED | OUTPATIENT
Start: 2021-02-23 | End: 2021-02-24

## 2021-02-23 RX ORDER — POTASSIUM CHLORIDE 7.45 MG/ML
10 INJECTION INTRAVENOUS PRN
Status: DISCONTINUED | OUTPATIENT
Start: 2021-02-23 | End: 2021-02-24

## 2021-02-23 RX ORDER — FENTANYL CITRATE 50 UG/ML
25 INJECTION, SOLUTION INTRAMUSCULAR; INTRAVENOUS
Status: DISCONTINUED | OUTPATIENT
Start: 2021-02-23 | End: 2021-02-24

## 2021-02-23 RX ADMIN — DEXTROSE AND SODIUM CHLORIDE: 5; 450 INJECTION, SOLUTION INTRAVENOUS at 22:51

## 2021-02-23 RX ADMIN — IPRATROPIUM BROMIDE AND ALBUTEROL SULFATE 1 AMPULE: .5; 3 SOLUTION RESPIRATORY (INHALATION) at 17:10

## 2021-02-23 RX ADMIN — Medication 10 MG: at 16:39

## 2021-02-23 RX ADMIN — IPRATROPIUM BROMIDE AND ALBUTEROL SULFATE 1 AMPULE: .5; 3 SOLUTION RESPIRATORY (INHALATION) at 11:25

## 2021-02-23 RX ADMIN — IPRATROPIUM BROMIDE AND ALBUTEROL SULFATE 1 AMPULE: .5; 3 SOLUTION RESPIRATORY (INHALATION) at 06:28

## 2021-02-23 RX ADMIN — OXYCODONE AND ACETAMINOPHEN 1 TABLET: 5; 325 TABLET ORAL at 14:53

## 2021-02-23 RX ADMIN — POTASSIUM CHLORIDE 10 MEQ: 7.46 INJECTION, SOLUTION INTRAVENOUS at 14:26

## 2021-02-23 RX ADMIN — PANTOPRAZOLE SODIUM 40 MG: 40 TABLET, DELAYED RELEASE ORAL at 06:05

## 2021-02-23 RX ADMIN — MEROPENEM 500 MG: 500 INJECTION, POWDER, FOR SOLUTION INTRAVENOUS at 09:59

## 2021-02-23 RX ADMIN — DEXTROSE MONOHYDRATE 12.5 G: 25 INJECTION, SOLUTION INTRAVENOUS at 14:08

## 2021-02-23 RX ADMIN — FENTANYL CITRATE 12.5 MCG: 50 INJECTION INTRAMUSCULAR; INTRAVENOUS at 13:02

## 2021-02-23 RX ADMIN — POTASSIUM CHLORIDE 10 MEQ: 7.46 INJECTION, SOLUTION INTRAVENOUS at 18:41

## 2021-02-23 RX ADMIN — POTASSIUM CHLORIDE 10 MEQ: 7.46 INJECTION, SOLUTION INTRAVENOUS at 20:41

## 2021-02-23 RX ADMIN — DEXTROSE AND SODIUM CHLORIDE: 5; 450 INJECTION, SOLUTION INTRAVENOUS at 10:02

## 2021-02-23 RX ADMIN — Medication 10 ML: at 08:53

## 2021-02-23 RX ADMIN — SODIUM CHLORIDE, PRESERVATIVE FREE 10 ML: 5 INJECTION INTRAVENOUS at 08:53

## 2021-02-23 RX ADMIN — PROPOFOL 50 MG: 10 INJECTION, EMULSION INTRAVENOUS at 16:34

## 2021-02-23 RX ADMIN — INSULIN GLARGINE 6 UNITS: 100 INJECTION, SOLUTION SUBCUTANEOUS at 21:04

## 2021-02-23 RX ADMIN — FENTANYL CITRATE 25 MCG: 50 INJECTION INTRAMUSCULAR; INTRAVENOUS at 18:23

## 2021-02-23 RX ADMIN — FENTANYL CITRATE 50 MCG: 50 INJECTION, SOLUTION INTRAMUSCULAR; INTRAVENOUS at 16:34

## 2021-02-23 RX ADMIN — POTASSIUM CHLORIDE 10 MEQ: 7.46 INJECTION, SOLUTION INTRAVENOUS at 19:45

## 2021-02-23 RX ADMIN — LEVOTHYROXINE SODIUM 75 MCG: 75 TABLET ORAL at 06:05

## 2021-02-23 RX ADMIN — IPRATROPIUM BROMIDE AND ALBUTEROL SULFATE 1 AMPULE: .5; 3 SOLUTION RESPIRATORY (INHALATION) at 22:55

## 2021-02-23 RX ADMIN — SODIUM CHLORIDE 1.19 UNITS/HR: 9 INJECTION, SOLUTION INTRAVENOUS at 10:40

## 2021-02-23 RX ADMIN — SODIUM CHLORIDE: 9 INJECTION, SOLUTION INTRAVENOUS at 16:28

## 2021-02-23 RX ADMIN — LIDOCAINE HYDROCHLORIDE 20 MG: 20 INJECTION, SOLUTION INTRAVENOUS at 16:34

## 2021-02-23 RX ADMIN — POTASSIUM CHLORIDE 10 MEQ: 7.46 INJECTION, SOLUTION INTRAVENOUS at 23:13

## 2021-02-23 RX ADMIN — HEPARIN SODIUM 5000 UNITS: 5000 INJECTION INTRAVENOUS; SUBCUTANEOUS at 22:01

## 2021-02-23 RX ADMIN — Medication 10 ML: at 20:41

## 2021-02-23 RX ADMIN — POTASSIUM CHLORIDE 10 MEQ: 7.46 INJECTION, SOLUTION INTRAVENOUS at 13:17

## 2021-02-23 RX ADMIN — MIDAZOLAM 2 MG: 1 INJECTION INTRAMUSCULAR; INTRAVENOUS at 16:28

## 2021-02-23 RX ADMIN — INSULIN LISPRO 6 UNITS: 100 INJECTION, SOLUTION INTRAVENOUS; SUBCUTANEOUS at 08:55

## 2021-02-23 ASSESSMENT — PAIN DESCRIPTION - PAIN TYPE
TYPE: CHRONIC PAIN
TYPE: CHRONIC PAIN

## 2021-02-23 ASSESSMENT — LIFESTYLE VARIABLES: SMOKING_STATUS: 1

## 2021-02-23 ASSESSMENT — PULMONARY FUNCTION TESTS
PIF_VALUE: 17
PIF_VALUE: 0
PIF_VALUE: 1
PIF_VALUE: 0
PIF_VALUE: 0
PIF_VALUE: 1
PIF_VALUE: 18
PIF_VALUE: 0
PIF_VALUE: 0
PIF_VALUE: 1
PIF_VALUE: 1
PIF_VALUE: 0
PIF_VALUE: 15
PIF_VALUE: 2
PIF_VALUE: 2
PIF_VALUE: 1

## 2021-02-23 ASSESSMENT — PAIN DESCRIPTION - FREQUENCY: FREQUENCY: CONTINUOUS

## 2021-02-23 ASSESSMENT — PAIN DESCRIPTION - LOCATION
LOCATION: CHEST
LOCATION: CHEST

## 2021-02-23 ASSESSMENT — PAIN DESCRIPTION - ONSET
ONSET: ON-GOING
ONSET: OTHER (COMMENT)

## 2021-02-23 ASSESSMENT — PAIN DESCRIPTION - DESCRIPTORS: DESCRIPTORS: CRUSHING;SORE

## 2021-02-23 ASSESSMENT — PAIN SCALES - GENERAL
PAINLEVEL_OUTOF10: 10
PAINLEVEL_OUTOF10: 10

## 2021-02-23 ASSESSMENT — PAIN DESCRIPTION - PROGRESSION: CLINICAL_PROGRESSION: NOT CHANGED

## 2021-02-23 NOTE — FLOWSHEET NOTE
This note also relates to the following rows which could not be included:  SpO2 - Cannot attach notes to unvalidated device data  CCPD initiated at this time utilizing aseptic practice. Dressing change completed. Site unremarkable. Effluent fluid 2004 ml translucent pale yellow return. No machine difficulties with drain and fill cycles.       02/22/21 1900   Vitals   BP (!) 92/51   Pulse 113   Resp 14   Weight 155 lb 10.3 oz (70.6 kg)   Cycler   Verification of Prescription CCPD   Total Volume Programmed 64631 mL   Therapy Time (Hours:Minutes) 9   Fill Volume 2000 mL   Last Fill Volume 2000 mL   Dextrose Setting Same (Nonextraneal)   Number of Cycles 5   Bag Volume 5000 mL   Number of Bags Used 2   I Drain (mL) 2004 mL   Average Dwell Time (Hours:Minutes) 30

## 2021-02-23 NOTE — PROGRESS NOTES
Pulmonary/Critical Care Progress Note    We are following patient for diabetes mellitus type 1, DKA, swelling with blisters of left leg requiring incision and drainage later today, acute respiratory failure with possible aspiration pneumonia growing Serratia marcescens in her sputum, hypertension, chronic kidney disease on peritoneal dialysis    SUBJECTIVE:  Patient is redeveloped some diabetic ketoacidosis because she has been made n.p.o. in preparation for surgery later today. Therefore, we have reinitiated insulin drip and DKA protocol. She will need to remain on both insulin drip and IV fluids until tomorrow morning. She will continue peritoneal dialysis as directed by her nephrologist.    The patient continues on vancomycin as needed and a reduced dose of meropenem in order to treat her antibiotics and possible leg infection which will be debrided and drained by the podiatry service. The chest x-ray although done with poor inspiration, is suggestive of a right upper lobe infiltrate. We will need to recheck this tomorrow.     MEDICATIONS:   sodium chloride  15 mL/kg Intravenous Once    metoprolol tartrate  25 mg Oral BID    Vitamin D  1,000 Units Oral Daily    vitamin D  50,000 Units Oral Weekly    insulin glargine  6 Units Subcutaneous Nightly    pantoprazole  40 mg Oral QAM AC    Calcium Acetate (Phos Binder)  1,334 mg Oral TID WC    meropenem  500 mg Intravenous Q24H    sodium chloride (PF)  10 mL Intravenous Daily    ipratropium-albuterol  1 ampule Inhalation Q6H    epoetin nena-epbx  3,000 Units Subcutaneous Once per day on Mon Wed Fri    And    epoetin nena-epbx  2,000 Units Subcutaneous Once per day on Mon Wed Fri    levothyroxine  75 mcg Per NG tube QAM AC    sodium chloride flush  10 mL Intravenous 2 times per day    heparin (porcine)  5,000 Units Subcutaneous 3 times per day    vancomycin (VANCOCIN) intermittent dosing (placeholder)   Other RX Placeholder      sodium chloride  insulin Stopped (02/23/21 1308)    dextrose 5 % and 0.45 % NaCl      sodium chloride      dextrose Stopped (02/16/21 1400)     dextrose, potassium chloride, magnesium sulfate, sodium phosphate IVPB **OR** sodium phosphate IVPB **OR** sodium phosphate IVPB, dextrose 5 % and 0.45 % NaCl, oxyCODONE-acetaminophen **OR** oxyCODONE-acetaminophen, fentanNYL, perflutren lipid microspheres, sodium chloride, potassium chloride, glucose, dextrose, glucagon (rDNA), dextrose, sodium chloride flush, polyethylene glycol, acetaminophen **OR** acetaminophen      REVIEW OF SYSTEMS:  Constitutional: Denies fever, weight loss, night sweats, and fatigue  Skin: Denies pigmentation, dark lesions, and rashes   HEENT: Denies hearing loss, tinnitus, ear drainage, epistaxis, sore throat, and hoarseness. Cardiovascular: Denies palpitations, chest pain, and chest pressure. Respiratory: Denies cough, dyspnea at rest, hemoptysis, apnea, and choking. Gastrointestinal: Denies nausea, vomiting, poor appetite, diarrhea, heartburn or reflux  Genitourinary: Denies dysuria, frequency, urgency or hematuria  Musculoskeletal: Denies myalgias, muscle weakness, and bone pain  Neurological: Denies dizziness, vertigo, headache, and focal weakness  Psychological: Denies anxiety and depression  Endocrine: Denies heat intolerance and cold intolerance  Hematopoietic/Lymphatic: Denies bleeding problems and blood transfusions    OBJECTIVE:  Vitals:    02/23/21 1330   BP:    Pulse: 113   Resp: 20   Temp:    SpO2: 96%     FiO2 : 50 %  O2 Flow Rate (L/min): 3 L/min  O2 Device: Nasal cannula    PHYSICAL EXAM:  Constitutional: No fever, chills, diaphoresis  Skin: Skin rash, no skin breakdown  HEENT: Unremarkable  Neck: JVD, lymphadenopathy, thyromegaly  Cardiovascular: S1, S2 regular.   No S3-S4 murmurs rubs present  Respiratory: Clear to auscultation bilaterally  Gastrointestinal: Soft, flat, nontender  Genitourinary: No CVA tenderness Extremities: Left leg lesion noted  Neurological: Arousable when she is in pain. No focal deficits detectable  Psychological: Flat affect    LABS:  WBC   Date Value Ref Range Status   02/23/2021 8.0 4.5 - 11.5 E9/L Final   02/22/2021 8.6 4.5 - 11.5 E9/L Final   02/21/2021 9.8 4.5 - 11.5 E9/L Final     Hemoglobin   Date Value Ref Range Status   02/23/2021 8.0 (L) 11.5 - 15.5 g/dL Final   02/22/2021 8.0 (L) 11.5 - 15.5 g/dL Final   02/21/2021 7.7 (L) 11.5 - 15.5 g/dL Final     Hematocrit   Date Value Ref Range Status   02/23/2021 25.4 (L) 34.0 - 48.0 % Final   02/22/2021 24.6 (L) 34.0 - 48.0 % Final   02/21/2021 23.6 (L) 34.0 - 48.0 % Final     MCV   Date Value Ref Range Status   02/23/2021 93.4 80.0 - 99.9 fL Final   02/22/2021 89.8 80.0 - 99.9 fL Final   02/21/2021 88.7 80.0 - 99.9 fL Final     Platelets   Date Value Ref Range Status   02/23/2021 366 130 - 450 E9/L Final   02/22/2021 328 130 - 450 E9/L Final   02/21/2021 285 130 - 450 E9/L Final     Sodium   Date Value Ref Range Status   02/23/2021 141 132 - 146 mmol/L Final   02/23/2021 139 132 - 146 mmol/L Final   02/22/2021 139 132 - 146 mmol/L Final     Potassium   Date Value Ref Range Status   02/23/2021 3.3 (L) 3.5 - 5.0 mmol/L Final   02/23/2021 3.8 3.5 - 5.0 mmol/L Final   02/22/2021 3.7 3.5 - 5.0 mmol/L Final     Potassium reflex Magnesium   Date Value Ref Range Status   02/15/2021 5.4 (H) 3.5 - 5.0 mmol/L Final   01/24/2021 5.7 (H) 3.5 - 5.0 mmol/L Final     Comment:     Specimen is moderately Hemolyzed. Result may be artificially increased. 12/28/2020 6.0 (H) 3.5 - 5.0 mmol/L Final     Comment:     Specimen is moderately Hemolyzed. Result may be artificially increased.      Chloride   Date Value Ref Range Status   02/23/2021 99 98 - 107 mmol/L Final   02/23/2021 96 (L) 98 - 107 mmol/L Final   02/22/2021 98 98 - 107 mmol/L Final     CO2   Date Value Ref Range Status   02/23/2021 26 22 - 29 mmol/L Final   02/23/2021 26 22 - 29 mmol/L Final Date Value Ref Range Status   02/23/2021 26 0 - 32 U/L Final   02/22/2021 38 (H) 0 - 32 U/L Final   02/21/2021 53 (H) 0 - 32 U/L Final     GFR Non-   Date Value Ref Range Status   02/23/2021 7 >=60 mL/min/1.73 Final     Comment:     Chronic Kidney Disease: less than 60 ml/min/1.73 sq.m. Kidney Failure: less than 15 ml/min/1.73 sq.m. Results valid for patients 18 years and older. 02/23/2021 7 >=60 mL/min/1.73 Final     Comment:     Chronic Kidney Disease: less than 60 ml/min/1.73 sq.m. Kidney Failure: less than 15 ml/min/1.73 sq.m. Results valid for patients 18 years and older. 02/22/2021 7 >=60 mL/min/1.73 Final     Comment:     Chronic Kidney Disease: less than 60 ml/min/1.73 sq.m. Kidney Failure: less than 15 ml/min/1.73 sq.m. Results valid for patients 18 years and older. GFR    Date Value Ref Range Status   02/23/2021 7  Final   02/23/2021 7  Final   02/22/2021 7  Final     Magnesium   Date Value Ref Range Status   02/23/2021 2.0 1.6 - 2.6 mg/dL Final   02/21/2021 1.9 1.6 - 2.6 mg/dL Final   02/17/2021 1.6 1.6 - 2.6 mg/dL Final     Phosphorus   Date Value Ref Range Status   02/23/2021 6.8 (H) 2.5 - 4.5 mg/dL Final   02/23/2021 8.2 (H) 2.5 - 4.5 mg/dL Final   02/22/2021 8.3 (H) 2.5 - 4.5 mg/dL Final     No results for input(s): PH, PO2, PCO2, HCO3, BE, O2SAT in the last 72 hours. RADIOLOGY:  XR CHEST PORTABLE   Final Result   Increased consolidation right upper lobe concerning for pneumonia or   developing atelectasis. Diffuse edema/ARDS unchanged. Suspect pulmonary venous hypertension. XR TIBIA FIBULA LEFT (2 VIEWS)   Final Result   No osseous abnormality. No discrete soft tissue abnormality. Mild diffuse edema      XR CHEST PORTABLE   Final Result   No change in bilateral airspace opacities. US DUP LOWER EXTREMITY LEFT RODO   Final Result   No evidence of DVT in the left lower extremity.       XR CHEST PORTABLE 6. Leg infection/blister on left  7. Depression  8. Initial PEA cardiac arrest, resolved    PLAN:  1. Antibiotics per infectious disease  2. Podiatry to perform surgery today with regard to left lower leg  3. Continue IV fluids  4. IV insulin as needed  5. Monitor carefully for redevelopment of DKA  6. Repeat chest x-ray in a.m. ATTESTATION:  ICU Staff Physician note of personal involvement in Care  As the attending physician, I certify that I personally reviewed the patients history and personally examined the patient to confirm the physical findings described above,  And that I reviewed the relevant imaging studies and available reports. I also discussed the differential diagnosis and all of the proposed management plans with the patient and individuals accompanying the patient to this visit. They had the opportunity to ask questions about the proposed management plans and to have those questions answered. This patient has a high probability of sudden, clinically significant deterioration, which requires the highest level of physician preparedness to intervene urgently. I managed/supervised life or organ supporting interventions that required frequent physician assessment. I devoted my full attention to the direct care of this patient for the amount of time indicated below. Time I spent with the family or surrogate(s) is included only if the patient was incapable of providing the necessary information or participating in medical decisions  Time devoted to teaching and to any procedures I billed separately is not included.     CRITICAL CARE TIME:  38 minutes    Electronically signed by Zunilda De Jesus MD on 2/23/2021 at 2:10 PM

## 2021-02-23 NOTE — FLOWSHEET NOTE
This note also relates to the following rows which could not be included:  BP - Cannot attach notes to unvalidated device data  [employer] tx completed at this time. No adverse effects. Dressing to exit pd site intact. Effluent fluid clear translucent yellow.         02/23/21 0900   Vitals   Pulse 109   Resp 25   SpO2 100 %   Cycler   Therapy Time (Hours:Minutes) 9   Last Fill Volume 1950 mL   Number of Cycles 5   Ultrafiltration (UF) (mL) 1707 mL   Average Dwell Time (Hours:Minutes) 30 min

## 2021-02-23 NOTE — CARE COORDINATION
2/23/21 Negative covid 2/15/21. Cm transition of care; pt continues care in icu, [place] rehab versus LTACH for a couple weeks to get stronger. Mom who is an RN will talk to patient about this- patient wanted CM to review discharge planning with her mother. Planned surgery on blisters today with Podiatry. Cm/SS following.  Electronically signed by Mk Bustamante RN-BC on 2/23/2021 at 12:16 PM

## 2021-02-23 NOTE — PROGRESS NOTES
[address] [place] [address] Hospitalist   Progress Note    Admitting Date and Time: 2/15/2021  8:07 AM  Admit Dx: Cardiac arrest [facility]) [I46.9]    Subjective/interval history:    2/16: Patient was admitted yesterday afternoon with PEA cardiac arrest, sepsis due to urinary tract infection, hyperkalemia in setting of end-stage renal disease on peritoneal dialysis. This morning she is intubated, but awake, alert, appears anxious. 2/17: Patient was extubated yesterday afternoon. She is awake, alert, oriented x3, neurologically intact. Very anxious appearing. States she has upper and lower back pain. 2/18: Patient awake, alert, somewhat somnolent, awakens easily to voice, but appears anxious when awake. She has pain all over and has generalized weakness. 2/19: Overnight, patient was seen for altered mental status and hypoxia. She was minimally responsive to sternal rub, but was protecting her airway. She was on nonrebreather mask, now on heated high flow nasal cannula oxygen. ABG did not show any significant hypercapnia. This morning, she is awake, alert, but complaining of pain all over. She is very tearful. I discussed with her that given her respiratory failure, and recent cardiac arrest, escalating pain medications would cause potential complications clean respiratory depression. 2/20: Patient awake, alert, states her pain has improved. Currently on heated high flow nasal cannula with FiO2 down to 70%. 2/21: Patient states she feels overall better today, but still has significant chest pain from chest compressions. Off of heated high flow nasal cannula oxygen, now on 8 L high flow nasal cannula oxygen. 2/22: Patient getting Echo at bedside. States she feels she needs something stronger for pain \"maybe something IV\". 2/23: Patient stated that she is a little scared with the upcoming surgery this afternoon. Per RN: patient was NPO for surgery and went back into DKA. She is now stabilized on insulin drip and D5 1/2 NS. Podiatry to take to OR this afternoon.      sodium chloride  15 mL/kg Intravenous Once    metoprolol tartrate  25 mg Oral BID    Vitamin D  1,000 Units Oral Daily    vitamin D  50,000 Units Oral Weekly    insulin glargine  6 Units Subcutaneous Nightly    pantoprazole  40 mg Oral QAM AC    Calcium Acetate (Phos Binder)  1,334 mg Oral TID WC    meropenem  500 mg Intravenous Q24H    sodium chloride (PF)  10 mL Intravenous Daily    ipratropium-albuterol  1 ampule Inhalation Q6H    epoetin nena-epbx  3,000 Units Subcutaneous Once per day on Mon Wed Fri    And    epoetin nena-epbx  2,000 Units Subcutaneous Once per day on Mon Wed Fri    levothyroxine  75 mcg Per NG tube QAM AC    sodium chloride flush  10 mL Intravenous 2 times per day    heparin (porcine)  5,000 Units Subcutaneous 3 times per day    vancomycin (VANCOCIN) intermittent dosing (placeholder)   Other RX Placeholder         dextrose, 12.5 g, PRN      potassium chloride, 10 mEq, PRN      magnesium sulfate, 1,000 mg, PRN      sodium phosphate IVPB, 10 mmol, PRN    Or      sodium phosphate IVPB, 15 mmol, PRN    Or      sodium phosphate IVPB, 20 mmol, PRN      dextrose 5 % and 0.45 % NaCl, , Continuous PRN      oxyCODONE-acetaminophen, 1 tablet, Q4H PRN    Or      oxyCODONE-acetaminophen, 2 tablet, Q4H PRN      fentanNYL, 12.5 mcg, Q8H PRN      perflutren lipid microspheres, 1.5 mL, ONCE PRN      sodium chloride, , PRN      potassium chloride, 20 mEq, PRN      glucose, 15 g, PRN      dextrose, 12.5 g, PRN      glucagon (rDNA), 1 mg, PRN      dextrose, 100 mL/hr, PRN      sodium chloride flush, 10 mL, PRN      polyethylene glycol, 17 g, Daily PRN      acetaminophen, 650 mg, Q6H PRN    Or      acetaminophen, 650 mg, Q6H PRN         Objective: BP 93/74   Pulse 106   Temp 99 °F (37.2 °C) (Axillary)   Resp 10   Ht 5' 4\" (1.626 m)   Wt 155 lb 10.3 oz (70.6 kg)   SpO2 98%   BMI 26.72 kg/m²   General Appearance: Alert and oriented x3, does not appear to be in any distress this morning   skin: warm and dry  Head: normocephalic and atraumatic  Eyes: pupils equal, round, and reactive to light, extraocular eye movements intact, conjunctivae normal  Neck: neck supple and non tender without mass   Pulmonary/Chest: Nonlabored. Diminished on the left, clear to auscultation on the right  Cardiovascular: normal rate, normal S1 and S2 and no carotid bruits  Abdomen: soft, non-distended, normal bowel sounds, no masses or organomegaly  Extremities: Left leg with bandage in place. Neurologic: Cranial nerves II through XII grossly intact, speech normal      Recent Labs     02/22/21  0513 02/23/21  0520 02/23/21  1112    139 141   K 3.7 3.8 3.3*   CL 98 96* 99   CO2 24 26 26   BUN 50* 46* 47*   CREATININE 8.5* 8.6* 8.7*   GLUCOSE 173* 270* 113*   CALCIUM 8.8 9.1 9.0       Recent Labs     02/21/21  0527 02/22/21  0513 02/23/21  0520   ALKPHOS 228* 241* 259*   PROT 5.8* 5.9* 5.8*   LABALBU 2.7* 2.6* 2.7*   BILITOT 0.3 0.3 <0.2   AST 16 17 14   ALT 53* 38* 26       Recent Labs     02/21/21  0527 02/22/21  0513 02/23/21  0520   WBC 9.8 8.6 8.0   RBC 2.66* 2.74* 2.72*   HGB 7.7* 8.0* 8.0*   HCT 23.6* 24.6* 25.4*   MCV 88.7 89.8 93.4   MCH 28.9 29.2 29.4   MCHC 32.6 32.5 31.5*   RDW 16.4* 16.1* 16.6*    328 366   MPV 10.9 10.4 10.4         Radiology:   XR CHEST PORTABLE   Final Result   Increased consolidation right upper lobe concerning for pneumonia or   developing atelectasis. Diffuse edema/ARDS unchanged. Suspect pulmonary venous hypertension. XR TIBIA FIBULA LEFT (2 VIEWS)   Final Result   No osseous abnormality. No discrete soft tissue abnormality.   Mild diffuse edema      XR CHEST PORTABLE   Final Result No change in bilateral airspace opacities. US DUP LOWER EXTREMITY LEFT RODO   Final Result   No evidence of DVT in the left lower extremity. XR CHEST PORTABLE   Final Result   1. No interval change in the multifocal bilateral diffuse hazy pulmonary   infiltrates. XR CHEST PORTABLE   Final Result   Persistent but perhaps slightly improved bilateral diffuse ill-defined   opacities left slightly greater than right. RECOMMENDATION:   Continued radiographic follow-up suggested. XR CHEST PORTABLE   Final Result   Improvement in right-sided opacities and worsening in left-sided airspace   opacities. Finding may represent pulmonary edema versus atelectasis. XR CHEST PORTABLE   Final Result   Extensive primarily right-sided pulmonary infiltrates new since the prior exam      CT HEAD WO CONTRAST   Final Result   No acute intracranial abnormality. XR ABDOMEN FOR NG/OG/NE TUBE PLACEMENT   Final Result   1. ET tube in satisfactory position. No evidence of acute intrathoracic   disease. 2. NG tube tip in the distal stomach. 3. Nonspecific small bowel distension that could be related to ileus or   small-bowel obstruction. 4. Suspected ascites. XR CHEST PORTABLE   Final Result   1. ET tube in satisfactory position. No evidence of acute intrathoracic   disease. 2. NG tube tip in the distal stomach. 3. Nonspecific small bowel distension that could be related to ileus or   small-bowel obstruction. 4. Suspected ascites.         TTE procedure:Echo Complete W/Doppler & Color Flow.       Procedure Date   Date: 02/22/2021 Start: 10:33 AM     Study Location: Portable   Technical Quality: Adequate visualization     Indications:S/P Cardiac Arrest.     Patient Status: Routine     Height: 64 inches Weight: 160 pounds BSA: 1.78 m^2 BMI: 27.46 kg/m^2     BP: 126/77 mmHg      Findings        Left Ventricle    Normal left ventricular chamber size.  Normal left ventricular systolic function, LVEF is 68%.    Moderate left ventricular concentric hypertrophy noted.    Normal diastolic function. Assessment/Plan:  Principal Problem:    Cardiac arrest [facility])  Active Problems:    ESRD on peritoneal dialysis ([facility]    Hypertension    Diabetic ketoacidosis with coma associated with type 1 diabetes mellitus ([facility]    UTI (urinary tract infection)    Diabetes mellitus type 1, uncontrolled ([facility]    Sepsis ([facility]    Acute respiratory failure with hypoxia ([facility]    Shock liver    Bacterial pneumonia  Resolved Problems:    * No resolved hospital problems. *      1. PEA cardiac arrest with successful resuscitation  -Possibly due to arrhythmia from electrolyte abnormalities. She was only slightly hyperkalemic on blood chemistries, however this was after she was given treatment for probable hyperkalemia based on EKG findings  -Monitor electrolytes and correct as necessary  -Treating potential underlying causes including electrolyte abnormalities, DKA, sepsis  - Repeat Echo yesterday 8/63 shows systolic and diastolic function wnl.    2.  Acute hypoxic respiratory failure due to bacterial pneumonia  -Antibiotics as noted below  -Improving  -She was on 8 L high flow nasal cannula oxygen but today down to 3L. -Basal and corrective scale insulin for now. Plan to resume insulin pump at discharge     3. DKA in the setting of uncontrolled type I DM  -Back on insulin drip and D5 1/2 NS as NPO. 4.  Sepsis due to UTI/right-sided pneumonia  -Continue vancomycin, and meropenem day #8 total of antibiotics. -Blood cultures drawn on 2/15 and repeat cultures on 2/18 showing no growth to date. 5.  Hyperkalemia  -Resolved  -Continue peritoneal dialysis, nephrology following     6. ESRD on peritoneal dialysis  -Nephrology following for dialysis management     8.   Shock liver  -Transaminases continue to improve, bilirubin normal. AST 17 and ALT 38 9. Left leg bullae with surrounding erythema  -podiatry evaluated and taking to surgery for debridement today. 10. Essential HTN  -Metoprolol tartrate resumed 2/17 her blood pressure has been well controlled     Code Status: Full code  DVT prophylaxis: Subcutaneous heparin    Case discussed with Dr. Eladio Persaud of podiatry earlier today. Case discussed with Dr. Raiza Marquez of pulmonary/critical care in ICU. NOTE: This report was transcribed using voice recognition software. Every effort was made to ensure accuracy; however, inadvertent computerized transcription errors may be present.      Electronically signed by Kimberley Montelongo MD on 2/23/2021 at 12:51 PM

## 2021-02-23 NOTE — PROGRESS NOTES
· Goal trough level:  15-20 mcg/mL, AUC/DEENA:   400-600  · Pt is a 31 yoF who presented from home in PEA arrest. Patient has hx of ESRD on peritoneal dialysis, diabetes, in DKA this admission, and multiple hospitalizations. Empiric antibiotics initiated for sepsis. · Hx ESRD, HD on peritoneal dialysis.   · 2/16: Random level = 27.1 mcg/mL  · 2/18: Random level = 19.8 mcg/mL  · 2/21: Random level = 31.3 mcg/mL    Plan:  · PD continues - no additional vancomycin today  · Random AM level tomorrow  · Follow dialysis schedule for further dosing and levels  · Pharmacist will follow and monitor/adjust dosing as necessary      Thank you for the consult,    Thomas Reyes, PharmD, BCPS 2/23/2021 8:12 AM   Ext: [phone]

## 2021-02-23 NOTE — PROGRESS NOTES
Attempted tx with pt, however pt was leaving with transport on arrival, as nsg states pt is going to OR now. Will attempt tx with pt at later time/date.  Alison Yousif, [address]

## 2021-02-23 NOTE — ANESTHESIA PRE PROCEDURE
Department of Anesthesiology  Preprocedure Note       Name:  Erick Lance   Age:  31 y.o.  :  1990                                          MRN:  [id number]         Date:  2021      Surgeon: Katy Trujillo):  Costa John DPM    Procedure: Procedure(s):  LEFT LEG INCISION AND DRAINAGE, DEBRIDEMENT, WOUND VAC APPLICATION    Medications prior to admission:   Prior to Admission medications    Medication Sig Start Date End Date Taking? Authorizing Provider   gabapentin (NEURONTIN) 100 MG capsule Take 200 mg by mouth 2 times daily.    Yes Historical Provider, MD   Insulin Pump - insulin aspart (patient supplied) Inject into the skin continuous Insulin-to-Carb Ratio (ICR): **  Insulin Sensitivity Factor (ISF): ** mg/dL per unit of insulin  Target Blood Glucose: ** mg/dL  Bolus Frequency: **   Yes Historical Provider, MD   rOPINIRole (REQUIP) 0.25 MG tablet Take 0.25 mg by mouth nightly   Yes Historical Provider, MD   losartan (COZAAR) 50 MG tablet Take 50 mg by mouth daily   Yes Historical Provider, MD   metoprolol tartrate (LOPRESSOR) 25 MG tablet Take 25 mg by mouth 2 times daily   Yes Historical Provider, MD   bumetanide (BUMEX) 2 MG tablet Take 1 tablet by mouth 2 times daily New higher frequency 20  Yes Avinash Garcia MD   Calcium Acetate, Phos Binder, 667 MG CAPS Take 1 capsule by mouth 3 times daily (with meals) 20  Yes Avinash Garcia MD   dilTIAZem (CARDIZEM CD) 240 MG extended release capsule Take 1 capsule by mouth daily 12/15/20  Yes Meenu Santa MD   mirtazapine (REMERON) 15 MG tablet Take 1 tablet by mouth nightly 12/15/20  Yes Meenu Santa MD   metoclopramide (REGLAN) 5 MG tablet Take 5 mg by mouth 3 times daily    Yes Historical Provider, MD   levothyroxine (SYNTHROID) 75 MCG tablet Take 1 tablet by mouth every morning (before breakfast) 20  Yes Enrique Wren MD   metOLazone (ZAROXOLYN) 5 MG tablet Take 5 mg by mouth daily   Yes Historical Provider, MD atorvastatin (LIPITOR) 40 MG tablet Take 1 tablet by mouth nightly 9/3/20  Yes Brian Armijo MD   blood glucose test strips (CONTOUR NEXT TEST) strip 1 each by In Vitro route 5 times daily As needed.  12/14/20   Zeke Peter MD   cloNIDine (CATAPRES) 0.1 MG tablet Take 0.1 mg by mouth 2 times daily as needed for High Blood Pressure    Historical Provider, MD       Current medications:    Current Facility-Administered Medications   Medication Dose Route Frequency Provider Last Rate Last Admin    dextrose 50 % IV solution  12.5 g Intravenous PRN Gaurav Jones MD   12.5 g at 02/23/21 1408    potassium chloride 10 mEq/100 mL IVPB (Peripheral Line)  10 mEq Intravenous PRN Gaurav Jones  mL/hr at 02/23/21 1426 10 mEq at 02/23/21 1426    magnesium sulfate 1000 mg in dextrose 5% 100 mL IVPB  1,000 mg Intravenous PRN Gaurav Jones MD        sodium phosphate 10 mmol in dextrose 5 % 250 mL IVPB  10 mmol Intravenous PRN Gaurav Jones MD        Or   Georgianna Olszewski sodium phosphate 15 mmol in dextrose 5 % 250 mL IVPB  15 mmol Intravenous PRN Gaurav Jones MD        Or   Georgianna Olszewski sodium phosphate 20 mmol in dextrose 5 % 500 mL IVPB  20 mmol Intravenous PRN Gaurav Jones MD        0.9 % sodium chloride bolus  15 mL/kg Intravenous Once Gaurav Jones MD        Followed by   Georgianna Olszewski 0.9 % sodium chloride infusion   Intravenous Continuous Gaurav Jones MD        insulin regular (HUMULIN R;NOVOLIN R) 100 Units in sodium chloride 0.9 % 100 mL infusion  0.1 Units/kg/hr Intravenous Continuous Jose Alfredo Cronin MD   Stopped at 02/23/21 1308    dextrose 5 % and 0.45 % sodium chloride infusion   Intravenous Continuous PRN Jose Alfredo Cronin MD        oxyCODONE-acetaminophen (PERCOCET) 5-325 MG per tablet 1 tablet  1 tablet Oral Q4H PRN Adela Davis MD   1 tablet at 02/23/21 1453    Or    oxyCODONE-acetaminophen (PERCOCET) 5-325 MG per tablet 2 tablet  2 tablet Oral Q4H PRN Adela Davis MD   2 tablet at 02/22/21 1632    metoprolol tartrate (LOPRESSOR) tablet 25 mg  25 mg Oral BID Boubacar Mcdaniel MD   Stopped at 02/22/21 2019    Vitamin D (CHOLECALCIFEROL) tablet 1,000 Units  1,000 Units Oral Daily Boubacar Mcdaniel MD   1,000 Units at 02/22/21 0859    fentaNYL (SUBLIMAZE) injection 12.5 mcg  12.5 mcg Intravenous Q8H PRN Evin Lopez DO   12.5 mcg at 02/23/21 1302    perflutren lipid microspheres (DEFINITY) injection 1.65 mg  1.5 mL Intravenous ONCE PRN Jayleen Hargrove DO        vitamin D (ERGOCALCIFEROL) capsule 50,000 Units  50,000 Units Oral Weekly Krishna Garvin MD   50,000 Units at 02/21/21 1640    insulin glargine (LANTUS) injection vial 6 Units  6 Units Subcutaneous Nightly Dana Murrell MD   6 Units at 02/22/21 2001    pantoprazole (PROTONIX) tablet 40 mg  40 mg Oral QAM AC Dana Murrell MD   40 mg at 02/23/21 0605    0.9 % sodium chloride infusion   Intravenous PRN Jl Gonzalez MD        Calcium Acetate (Phos Binder) CAPS 1,334 mg  1,334 mg Oral TID WC Joe Yates MD   1,334 mg at 02/22/21 1632    meropenem (MERREM) 500 mg IVPB (mini-bag)  500 mg Intravenous Q24H Rakesh Pitts MD   Stopped at 02/23/21 1029    potassium chloride 20 mEq/50 mL IVPB (Central Line)  20 mEq Intravenous PRN Dana Murrell MD        sodium chloride (PF) 0.9 % injection 10 mL  10 mL Intravenous Daily Dana Murrell MD   10 mL at 02/23/21 0853    ipratropium-albuterol (DUONEB) nebulizer solution 1 ampule  1 ampule Inhalation Q6H Dana Murrell MD   1 ampule at 02/23/21 1125    epoetin nena-epbx (RETACRIT) injection 3,000 Units  3,000 Units Subcutaneous Once per day on Mon Wed Fri Joe Yates MD   3,000 Units at 02/22/21 1134    And    epoetin nena-epbx (RETACRIT) injection 2,000 Units  2,000 Units Subcutaneous Once per day on Mon Wed Fri Joe Yates MD   2,000 Units at 02/22/21 1134    glucose (GLUTOSE) 40 % oral gel 15 g  15 g Oral PRN Evin Lopez DO        dextrose 50 % IV solution  12.5 g Intravenous PRN Refugia Fulling, DO   12.5 g at 02/19/21 1735    glucagon (rDNA) injection 1 mg  1 mg Intramuscular PRN Evin Lopez, DO        dextrose 5 % solution  100 mL/hr Intravenous PRN Refugia Fulling, DO   Stopped at 02/16/21 1400    levothyroxine (SYNTHROID) tablet 75 mcg  75 mcg Per NG tube QAM AC Evin Lopez, DO   75 mcg at 02/23/21 0605    sodium chloride flush 0.9 % injection 10 mL  10 mL Intravenous 2 times per day Evin Lopez, DO   10 mL at 02/23/21 0853    sodium chloride flush 0.9 % injection 10 mL  10 mL Intravenous PRN Evin Lopez, DO        polyethylene glycol (GLYCOLAX) packet 17 g  17 g Oral Daily PRN Evin Lopez, DO        acetaminophen (TYLENOL) tablet 650 mg  650 mg Oral Q6H PRN Evin Lopez, DO   650 mg at 02/20/21 0327    Or    acetaminophen (TYLENOL) suppository 650 mg  650 mg Rectal Q6H PRN Evin Lopez, DO        heparin (porcine) injection 5,000 Units  5,000 Units Subcutaneous 3 times per day Refugia Fulling, DO   Stopped at 02/23/21 0600    vancomycin (VANCOCIN) intermittent dosing (placeholder)   Other RX Placeholder Evin Lopez DO           Allergies:     Allergies   Allergen Reactions    Cefepime Other (See Comments)     \" neurological side effect- slurred speech and confusion    Toradol [Ketorolac Tromethamine] Anaphylaxis and Hives       Problem List:    Patient Active Problem List   Diagnosis Code    High-risk pregnancy O09.90    Diabetic ketoacidosis with coma associated with type 1 diabetes mellitus (HCC) E10.11    Previous stillbirth or demise, antepartum O09.299    Non compliance w medication regimen Z91.14    UTI (urinary tract infection) N39.0    Tobacco smoking complicating pregnancy E94.889    Drug use complicating pregnancy in third trimester O99.323    MTHFR mutation ([facility] E72.12    Poorly controlled type 1 diabetes mellitus ([facility] E10.65    Diabetes mellitus type 1, uncontrolled ([facility] E10.65    Previous  delivery affecting pregnancy, antepartum O34.219    Opioid abuse, episodic ([facility]) F11.10    Tobacco use Z72.0    Sepsis ([address] A41.9    Hypoglycemia E16.2    Hypertension I10    Type 1 diabetes mellitus with other specified complication ([address] F19.18    Diabetic gastroparesis associated with type 1 diabetes mellitus ([facility]) E10.43, K31.84    Iron deficiency anemia D50.9    Acute respiratory failure with hypoxia ([facility]) J96.01    Sinus tachycardia R00.0    Bladder dysfunction N31.9    Acute heart failure with preserved ejection fraction ([facility]) I50.31    Chronic kidney disease N18.9    Severe protein-calorie malnutrition ([facility]) E43    ESRD (end stage renal disease) ([facility]) N18.6    Uncontrolled type 1 diabetes mellitus with hyperglycemia, with long-term current use of insulin ([facility]) E10.65    ESRD on peritoneal dialysis ([facility]) N18.6, Z99.2    Hypothyroidism E03.9    Hyperlipidemia E78.5    Acute cystitis without hematuria N30.00    Nondisplaced fracture of neck of fifth metacarpal bone, left hand, initial encounter for closed fracture S62.367A    Acute encephalopathy G93.40    Effusion of left knee M25.462    Acute metabolic encephalopathy M21.45    Chronic right-sided low back pain M54.5, G89.29    Endocarditis I38    Chronic diarrhea K52.9    Valvular endocarditis I38    Hyperosmolar hyperglycemic state (HHS) ([address] E11.00, E11.65    Seizure ([address] R56.9    Hypothermia T68. XXXA    Hyperkalemia E87.5    Diabetic polyneuropathy associated with type 1 diabetes mellitus ([address] E10.42    Cardiac arrest ([address] I46.9    Shock liver K72.00    Bacterial pneumonia J15.9       Past Medical History:        Diagnosis Date    Acute congestive heart failure (HCC)     BC (acute kidney injury) ([address] 10/1/2019    Cephalgia 10/9/2019    Chronic kidney disease     Depression     Diabetes mellitus ([address]     Diabetic ketoacidosis ([address] 2011    Hemodialysis patient ([address]     History of blood transfusion 2019    Hyperlipidemia 10/8/2020    Hypothyroidism 10/8/2020    Iron deficiency anemia 10/1/2019    MDRO (multiple drug resistant organisms) resistance     MRSA (methicillin resistant Staphylococcus aureus)     back wound abcess    Non compliance w medication regimen 3/30/2016    Other disorders of kidney and ureter     Pregnancy 3/30/2016    16 weeks    Seizure ([address] 2020    Severe pre-eclampsia in third trimester 2016    Shock liver 2/15/2021       Past Surgical History:        Procedure Laterality Date    BACK SURGERY      abscess     SECTION      x2    CHOLECYSTECTOMY, LAPAROSCOPIC N/A 2019    CHOLECYSTECTOMY LAPAROSCOPIC performed by Christina Cadet MD at [address] N/A 2018    COLONOSCOPY WITH BIOPSY performed by Leonard Mascorro MD at [address] N/A 2018    COLONOSCOPY WITH BIOPSY performed by Tino Juarez MD at [address] [facility] ECHO COMPL [address]  2012    EF 57%    ECHO COMPL W DOP COLOR FLOW  6/10/2013         EMBOLECTOMY N/A 2020    [facility] REQS ROOM 3 performed by Tianna Myers MD at [facility] N/A 2020    LAPAROSCOPIC INSERTION PERITONEAL DIALYSIS CATHETER performed by Amilcar Cho MD at [facility] [address] ESOPHAGOGASTRODUODENOSCOPY TRANSORAL DIAGNOSTIC N/A 2018    EGD ESOPHAGOGASTRODUODENOSCOPY performed by Leonard Mascorro MD at [address] [facility] TRANSESOPHAGEAL ECHOCARDIOGRAM N/A 10/19/2020    TRANSESOPHAGEAL ECHOCARDIOGRAM WITH BUBBLE STUDY performed by Earnest Lowery MD at [address] [address]  2018    UPPER GASTROINTESTINAL ENDOSCOPY  2018    EGD BIOPSY performed by Tino Juarez MD at [facility] N/A 10/11/2019    EGD ESOPHAGOGASTRODUODENOSCOPY performed by Norma Doty DO at [address] History:    Social History     Tobacco Use    Smoking status: Current Every Day Smoker     Packs/day: 0.50     Years: 6.00     Pack years: 3.00     Types: Cigarettes    Smokeless tobacco: Current User   Substance Use Topics    Alcohol use: No                                Ready to quit: Not Answered  Counseling given: Not Answered      Vital Signs (Current):   Vitals:    02/23/21 1300 02/23/21 1330 02/23/21 1458 02/23/21 1600   BP: (!) 97/58      Pulse: 117 113 118 118   Resp: 21 20 19 17   Temp:       TempSrc:       SpO2: 94% 96% 92% 95%   Weight:       Height:                                                  BP Readings from Last 3 Encounters:   02/23/21 (!) 97/58   01/24/21 (!) 168/102   01/07/21 (!) 102/59       NPO Status:                                                                                 BMI:   Wt Readings from Last 3 Encounters:   02/22/21 155 lb 10.3 oz (70.6 kg)   01/24/21 143 lb (64.9 kg)   01/07/21 143 lb (64.9 kg)     Body mass index is 26.72 kg/m². CBC:   Lab Results   Component Value Date    WBC 8.0 02/23/2021    RBC 2.72 02/23/2021    HGB 8.0 02/23/2021    HCT 25.4 02/23/2021    MCV 93.4 02/23/2021    RDW 16.6 02/23/2021     02/23/2021       CMP:   Lab Results   Component Value Date     02/23/2021    K 3.3 02/23/2021    K 5.4 02/15/2021    CL 99 02/23/2021    CO2 26 02/23/2021    BUN 47 02/23/2021    CREATININE 8.7 02/23/2021    GFRAA 7 02/23/2021    LABGLOM 7 02/23/2021    GLUCOSE 113 02/23/2021    GLUCOSE 130 05/18/2012    PROT 5.8 02/23/2021    CALCIUM 9.0 02/23/2021    BILITOT <0.2 02/23/2021    ALKPHOS 259 02/23/2021    AST 14 02/23/2021    ALT 26 02/23/2021       POC Tests: No results for input(s): POCGLU, POCNA, POCK, POCCL, POCBUN, POCHEMO, POCHCT in the last 72 hours.     Coags:   Lab Results   Component Value Date    PROTIME 15.8 02/16/2021    PROTIME 13.6 08/14/2011    INR 1.3 02/16/2021    APTT 30.0 10/31/2020       HCG (If Applicable):   Lab Results   Component Value Date PREGTESTUR NEGATIVE 11/03/2020    HCG 08353.4 (H) 06/26/2013        ABGs:   Lab Results   Component Value Date    PHART 7.345 07/20/2012    PO2ART 91.5 10/29/2019    FNW2STT 35.0 10/29/2019    VPH2LUE 14.0 02/15/2021    Z8GYSGZI 97.7 09/08/2012        Type & Screen (If Applicable):  Lab Results   Component Value Date    LABABO O 12/29/2011    [address] POSITIVE 12/29/2011       Drug/Infectious Status (If Applicable):  No results found for: HIV, HEPCAB    COVID-19 Screening (If Applicable):   Lab Results   Component Value Date    COVID19 Not Detected 02/15/2021    COVID19 Not Detected 11/25/2020         Anesthesia Evaluation  Patient summary reviewed no history of anesthetic complications:   Airway: Mallampati: II  TM distance: >3 FB   Neck ROM: full  Mouth opening: > = 3 FB Dental: normal exam         Pulmonary: breath sounds clear to auscultation  (+) current smoker                          ROS comment: Recently intubated after cardiac arrest at home   Cardiovascular:    (+) hypertension:, valvular problems/murmurs:,         Rhythm: regular  Rate: abnormal                    Neuro/Psych:   (+) seizures:, depression/anxiety             GI/Hepatic/Renal:   (+) renal disease: dialysis,           Endo/Other:    (+) Diabetes (Diabetic ketoacidosis with coma )Type I DM, using insulin, hypothyroidism::., .                 Abdominal:           Vascular:                                        Anesthesia Plan      MAC     ASA 4       Induction: intravenous. Anesthetic plan and risks discussed with patient. Plan discussed with CRNA.                   Krystal Garcia MD   2/23/2021

## 2021-02-23 NOTE — PROGRESS NOTES
Department of Internal Medicine  Nephrology Progress Note    Events reviewed. SUBJECTIVE:  We are following Wilton Flores for ESRD on Peritoneal Dialysis. I have seen and examined her in the ICU, she appears very somnolent today, ICU nurse present at bedside, reports no acute issues.   She is going for an I&D of her left leg today    PHYSICAL EXAM:      Vitals:    VITALS:  BP (!) 97/58   Pulse 118   Temp 99 °F (37.2 °C) (Axillary)   Resp 19   Ht 5' 4\" (1.626 m)   Wt 155 lb 10.3 oz (70.6 kg)   SpO2 92%   BMI 26.72 kg/m²   24HR INTAKE/OUTPUT:      Intake/Output Summary (Last 24 hours) at 2/23/2021 1555  Last data filed at 2/23/2021 0900  Gross per 24 hour   Intake 30 ml   Output 3711 ml   Net -3681 ml     PD Catheter Exam:  PD catheter exit site clean    Constitutional: Awake, chronically ill  HEENT:  Normocephalic, PERRL  Respiratory: Decreased breath sounds on the basis  Cardiovascular/Edema:  RRR, S1/S2  Gastrointestinal:  Soft, PD catheter exit site clean   Neurologic:  Nonfocal, AVELAR  Skin:  Warm, dry, no lesions  Other:  +edema    Scheduled Meds:   sodium chloride  15 mL/kg Intravenous Once    metoprolol tartrate  25 mg Oral BID    Vitamin D  1,000 Units Oral Daily    vitamin D  50,000 Units Oral Weekly    insulin glargine  6 Units Subcutaneous Nightly    pantoprazole  40 mg Oral QAM AC    Calcium Acetate (Phos Binder)  1,334 mg Oral TID WC    meropenem  500 mg Intravenous Q24H    sodium chloride (PF)  10 mL Intravenous Daily    ipratropium-albuterol  1 ampule Inhalation Q6H    epoetin nena-epbx  3,000 Units Subcutaneous Once per day on Mon Wed Fri    And    epoetin nena-epbx  2,000 Units Subcutaneous Once per day on Mon Wed Fri    levothyroxine  75 mcg Per NG tube QAM AC    sodium chloride flush  10 mL Intravenous 2 times per day    heparin (porcine)  5,000 Units Subcutaneous 3 times per day    vancomycin (VANCOCIN) intermittent dosing (placeholder)   Other RX Placeholder Continuous Infusions:   sodium chloride      insulin Stopped (02/23/21 1308)    dextrose 5 % and 0.45 % NaCl      sodium chloride      dextrose Stopped (02/16/21 1400)     PRN Meds:.dextrose, potassium chloride, magnesium sulfate, sodium phosphate IVPB **OR** sodium phosphate IVPB **OR** sodium phosphate IVPB, dextrose 5 % and 0.45 % NaCl, oxyCODONE-acetaminophen **OR** oxyCODONE-acetaminophen, fentanNYL, perflutren lipid microspheres, sodium chloride, potassium chloride, glucose, dextrose, glucagon (rDNA), dextrose, sodium chloride flush, polyethylene glycol, acetaminophen **OR** acetaminophen    DATA:    CBC:   Lab Results   Component Value Date    WBC 8.0 02/23/2021    RBC 2.72 02/23/2021    HGB 8.0 02/23/2021    HCT 25.4 02/23/2021    MCV 93.4 02/23/2021    MCH 29.4 02/23/2021    MCHC 31.5 02/23/2021    RDW 16.6 02/23/2021     02/23/2021    MPV 10.4 02/23/2021     CMP:    Lab Results   Component Value Date     02/23/2021    K 3.3 02/23/2021    K 5.4 02/15/2021    CL 99 02/23/2021    CO2 26 02/23/2021    BUN 47 02/23/2021    CREATININE 8.7 02/23/2021    GFRAA 7 02/23/2021    LABGLOM 7 02/23/2021    GLUCOSE 113 02/23/2021    GLUCOSE 130 05/18/2012    PROT 5.8 02/23/2021    LABALBU 2.7 02/23/2021    LABALBU 4.1 05/18/2012    CALCIUM 9.0 02/23/2021    BILITOT <0.2 02/23/2021    ALKPHOS 259 02/23/2021    AST 14 02/23/2021    ALT 26 02/23/2021     Magnesium:    Lab Results   Component Value Date    MG 2.0 02/23/2021     Phosphorus:    Lab Results   Component Value Date    PHOS 6.8 02/23/2021     Radiology Review:      Chest x-ray February 15, 2021   1. ET tube in satisfactory position.  No evidence of acute intrathoracic   disease. 2. NG tube tip in the distal stomach. 3. Nonspecific small bowel distension that could be related to ileus or   small-bowel obstruction. 4. Suspected ascites.        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  She was admitted to MICU with a diagnosis of DKA and status post cardiac arrest, she was intubated. Patient was extubated earlier today. Problems resolved:    · Respiratory failure, status post intubation, extubated   · Type I DM, DKA, anion gap and bicarbonate levels improved, off insulin drip  · S/p PEA cardiac arrest      IMPRESSION/RECOMMENDATIONS:      1. ESRD on peritoneal dialysis, to continue CCPD Total UF 1903 mL,  drainage clear pale yellow  2. Probably sepsis, on meropenem and vancomycin. WBCs 20.4>>>17.5>>>17.3. .Max temp 101.5, blood cultures no growth 2/18  3. MBD of CKD, calcium acetate 667 g  2 tablets 3 times daily. Phos  9.4>>>10   4. Anemia of CKD, continue epoetin alpha 5000 units 3 times a week. H/H 8.3/24.3  5. Transaminitis improving, trending downward ALT 88, AST 26   6. Malnutrition, protein 5.7, albumin 2.6, Renal supplements BID  7. Vit D deficiency-Results for Kar Olson (MRN [id number]) as of 2/21/2021 16:05   Ref. Range 2/21/2021 05:27   Vit D, 25-Hydroxy Latest Ref Range: 30 - 100 ng/mL 7 (L)       Plan:    · Continue CCPD prescription  2.5% all exchanges  · Continue ergo 04088 units weekly  · SPA 25 gm iv daily x 3  · Check 2 dm echo  · Continue to monitor labs  · Check Vitamin D level in am  · Magnesium level in am  · Incision and drainage today  · Potassium replaced IV with 20 mEq of potassium chloride   .

## 2021-02-23 NOTE — PROGRESS NOTES
Podiatry Resident Progress Note    SUBJECTIVE:   Patient seen bedside for follow up care of left leg blisters with swelling. Patient states she feels a little better today, but notes she still has pain in her left leg. She states it is worsened with any movement or pressure. Patient states she is nervous about surgery, but states she is ready to proceed if it must be done. Patient admits to lethargy today. Patient denies fever, chills, nausea, vomiting, and chest pain.     Past Medical History:   Diagnosis Date    Acute congestive heart failure ([address]     BC (acute kidney injury) ([address] 10/1/2019    Cephalgia 10/9/2019    Chronic kidney disease     Depression     Diabetes mellitus ([address]     Diabetic ketoacidosis ([address] 2011    Hemodialysis patient ([address]     History of blood transfusion 2019    Hyperlipidemia 10/8/2020    Hypothyroidism 10/8/2020    Iron deficiency anemia 10/1/2019    MDRO (multiple drug resistant organisms) resistance     MRSA (methicillin resistant Staphylococcus aureus)     back wound abcess    Non compliance w medication regimen 3/30/2016    Other disorders of kidney and ureter     Pregnancy 3/30/2016    16 weeks    Seizure ([address] 2020    Severe pre-eclampsia in third trimester 2016    Shock liver 2/15/2021        Past Surgical History:   Procedure Laterality Date    BACK SURGERY      abscess     SECTION      x2    CHOLECYSTECTOMY, LAPAROSCOPIC N/A 2019    CHOLECYSTECTOMY LAPAROSCOPIC performed by Juve Nguyen MD at [facility] N/A 2018    COLONOSCOPY WITH BIOPSY performed by Marylou Connell MD at [address] [place] [address] COLONOSCOPY N/A 2018    COLONOSCOPY WITH BIOPSY performed by Yvette Winchester MD at [address] [place] [address] ECHO COMPL [address]  2012    EF 57%    ECHO COMPL W DOP COLOR FLOW  6/10/2013         EMBOLECTOMY N/A 2020 [facility], [id number] [place] [address], YULISSA -- REQS ROOM 3 performed by Tremaine Roche MD at [address] [facility] N/A 6/26/2020    LAPAROSCOPIC INSERTION PERITONEAL DIALYSIS CATHETER performed by Lalito Song MD at [facility] 80 ESOPHAGOGASTRODUODENOSCOPY TRANSORAL DIAGNOSTIC N/A 5/30/2018    EGD ESOPHAGOGASTRODUODENOSCOPY performed by Earlene Casiano MD at [id number] [place] [address] TRANSESOPHAGEAL ECHOCARDIOGRAM N/A 10/19/2020    TRANSESOPHAGEAL ECHOCARDIOGRAM WITH BUBBLE STUDY performed by Sebastián Mata MD at [address] [place] [address] TUNNELED VENOUS PORT PLACEMENT  04/2018    UPPER GASTROINTESTINAL ENDOSCOPY  12/18/2018    EGD BIOPSY performed by Carol Schuler MD at [facility] N/A 10/11/2019    EGD ESOPHAGOGASTRODUODENOSCOPY performed by Alonzo Bustamante DO at [address] [place] [address] History   Problem Relation Age of Onset    Asthma Mother     Hypertension Mother     High Blood Pressure Mother     Diabetes Mother     Asthma Brother     High Blood Pressure Father         Social History     Tobacco Use    Smoking status: Current Every Day Smoker     Packs/day: 0.50     Years: 6.00     Pack years: 3.00     Types: Cigarettes    Smokeless tobacco: Current User   Substance Use Topics    Alcohol use: No        Prior to Admission medications    Medication Sig Start Date End Date Taking? Authorizing Provider   gabapentin (NEURONTIN) 100 MG capsule Take 200 mg by mouth 2 times daily.    Yes Historical Provider, MD   Insulin Pump - insulin aspart (patient supplied) Inject into the skin continuous Insulin-to-Carb Ratio (ICR): **  Insulin Sensitivity Factor (ISF): ** mg/dL per unit of insulin  Target Blood Glucose: ** mg/dL  Bolus Frequency: **   Yes Historical Provider, MD   rOPINIRole (REQUIP) 0.25 MG tablet Take 0.25 mg by mouth nightly   Yes Historical Provider, MD   losartan (COZAAR) 50 MG tablet Take 50 mg by mouth daily   Yes Historical Provider, MD metoprolol tartrate (LOPRESSOR) 25 MG tablet Take 25 mg by mouth 2 times daily   Yes Historical Provider, MD   bumetanide (BUMEX) 2 MG tablet Take 1 tablet by mouth 2 times daily New higher frequency 12/29/20  Yes Crys Llamas MD   Calcium Acetate, Phos Binder, 667 MG CAPS Take 1 capsule by mouth 3 times daily (with meals) 12/29/20  Yes Crys Llamas MD   dilTIAZem (CARDIZEM CD) 240 MG extended release capsule Take 1 capsule by mouth daily 12/15/20  Yes Jeannie Bennett MD   mirtazapine (REMERON) 15 MG tablet Take 1 tablet by mouth nightly 12/15/20  Yes Jeannie Bennett MD   metoclopramide (REGLAN) 5 MG tablet Take 5 mg by mouth 3 times daily    Yes Historical Provider, MD   levothyroxine (SYNTHROID) 75 MCG tablet Take 1 tablet by mouth every morning (before breakfast) 11/12/20  Yes Kendra Kong MD   metOLazone (ZAROXOLYN) 5 MG tablet Take 5 mg by mouth daily   Yes Historical Provider, MD   atorvastatin (LIPITOR) 40 MG tablet Take 1 tablet by mouth nightly 9/3/20  Yes Jeannie Bennett MD   blood glucose test strips (CONTOUR NEXT TEST) strip 1 each by In Vitro route 5 times daily As needed.  12/14/20   Denver Hooks MD   cloNIDine (CATAPRES) 0.1 MG tablet Take 0.1 mg by mouth 2 times daily as needed for High Blood Pressure    Historical Provider, MD      Allergies:  Cefepime and Toradol [ketorolac tromethamine]     OBJECTIVE:      VITALS:  Vitals:    02/23/21 1600   BP:    Pulse: 118   Resp: 17   Temp:    SpO2: 95%        LABS:   Recent Labs     02/21/21  0527 02/22/21  0513 02/22/21  1428 02/23/21  0520   WBC 9.8 8.6  --  8.0   HGB 7.7* 8.0*  --  8.0*   HCT 23.6* 24.6*  --  25.4*    328  --  366   CRP  --   --  12.8*  --    SEDRATE  --   --  95*  --    PROCAL  --   --  6.14*  --    ASO  --   --  46  --        PHYSICAL EXAM:  Vascular: DP pulses 2/4 and PT pulses 2/4 bilaterally. CFT < 3 seconds to all digits. None pitting edema present to left leg. Temperature gradient warm to warm from proximal distal left side. Neurologic:    Protective sensation is present in distal lower extremity bilaterally - verified using [place] touch test.    Dermatologic:    Previous bullae have now be deroofed and granular wound beds remain on a very violacious base with some extending erythema. mild serous drainage. No mj purulence or malodor. No probe to deeper structures. No tunneling or tracking. Hyperkeratotic lesion are absent. Erythema is blanchable. No notable fluctuance or crepitations. Musculoskeletal:   Pain on palpation of left leg. No pain on compression of posterior calves. Able to wiggle digits. Leg compartments soft today.        IMAGING:  Ct Head Wo Contrast    Result Date: 2/15/2021 EXAMINATION: CT OF THE HEAD WITHOUT CONTRAST  2/15/2021 9:31 am TECHNIQUE: CT of the head was performed without the administration of intravenous contrast. Dose modulation, iterative reconstruction, and/or weight based adjustment of the mA/kV was utilized to reduce the radiation dose to as low as reasonably achievable. COMPARISON: Comparison is dated November 20 08/04/2020 HISTORY: ORDERING SYSTEM PROVIDED HISTORY: seizure, cardiac arrest TECHNOLOGIST PROVIDED HISTORY: Has a \"code stroke\" or \"stroke alert\" been called? ->No Reason for exam:->seizure, cardiac arrest FINDINGS: BRAIN/VENTRICLES: There is no acute intracranial hemorrhage, mass effect or midline shift. No abnormal extra-axial fluid collection. The gray-white differentiation is maintained without evidence of an acute infarct. There is no evidence of hydrocephalus. ORBITS: The visualized portion of the orbits demonstrate no acute abnormality. SINUSES: The visualized paranasal sinuses and mastoid air cells demonstrate no acute abnormality. SOFT TISSUES/SKULL:  No acute abnormality of the visualized skull or soft tissues. No acute intracranial abnormality. Xr Chest Portable    Result Date: 2/21/2021  EXAMINATION: ONE XRAY VIEW OF THE CHEST 2/21/2021 7:10 am COMPARISON: February 19, 2021 HISTORY: ORDERING SYSTEM PROVIDED HISTORY: RESP FAILURE TECHNOLOGIST PROVIDED HISTORY: Reason for exam:->RESP FAILURE FINDINGS: Trachea midline. Cardiac silhouette is stable in size. There are bilateral airspace opacities without interval change. No pneumothorax. No sizable pleural effusions. No change in bilateral airspace opacities.     Xr Chest Portable    Result Date: 2/19/2021 EXAMINATION: ONE XRAY VIEW OF THE CHEST 2/19/2021 6:33 am COMPARISON: 02/18/2021 HISTORY: ORDERING SYSTEM PROVIDED HISTORY: resp failure TECHNOLOGIST PROVIDED HISTORY: Reason for exam:->resp failure FINDINGS: The heart is enlarged. There is persistent bilateral hazy infiltration of the right and left lungs unchanged compared to prior study. There is a trace right pleural effusion. 1. No interval change in the multifocal bilateral diffuse hazy pulmonary infiltrates. Xr Chest Portable    Result Date: 2/18/2021  EXAMINATION: ONE XRAY VIEW OF THE CHEST 2/18/2021 10:27 pm COMPARISON: Chest series from the same day at 10:12 HISTORY: [facility]: hypoxia TECHNOLOGIST PROVIDED HISTORY: Reason for exam:->hypoxia FINDINGS: Symmetric low lung volumes. Persistent but perhaps slightly improved bilateral diffuse ill-defined opacities left slightly greater than right. No pleural effusion or pneumothorax definitely seen. Cardiac silhouette is enlarged. Difficult to assess pulmonary vascularity. Osseous and thoracic soft tissue structures demonstrate no acute findings. Persistent but perhaps slightly improved bilateral diffuse ill-defined opacities left slightly greater than right. RECOMMENDATION: Continued radiographic follow-up suggested. Xr Chest Portable    Result Date: 2/18/2021  EXAMINATION: ONE XRAY VIEW OF THE CHEST 2/18/2021 9:56 am COMPARISON: Chest radiograph February 17, 2021 HISTORY: ORDERING SYSTEM PROVIDED HISTORY: resp filure TECHNOLOGIST PROVIDED HISTORY: Reason for exam:->resp filure FINDINGS: Trachea is midline. Cardiomediastinal silhouette is stable in size. Interval improvement in right-sided airspace opacity with worsening of left-sided airspace opacities. No pneumothorax. No sizable pleural effusions. Improvement in right-sided opacities and worsening in left-sided airspace opacities. Finding may represent pulmonary edema versus atelectasis.     Xr Chest Portable Result Date: 2/17/2021  EXAMINATION: ONE XRAY VIEW OF THE CHEST 2/17/2021 12:39 pm COMPARISON: Comparison is dated February 15, 2021 HISTORY: ORDERING SYSTEM PROVIDED HISTORY: sob TECHNOLOGIST PROVIDED HISTORY: Reason for exam:->sob FINDINGS: ET tube is been removed There are extensive pulmonary primarily right-sided infiltrates with marked airspace consolidation. There left parahilar infiltrates. Cardiac silhouette is partially obscured. No pleural effusion seen. Extensive primarily right-sided pulmonary infiltrates new since the prior exam    Xr Chest Portable    Result Date: 2/15/2021  EXAMINATION: ONE SUPINE XRAY VIEW(S) OF THE ABDOMEN; ONE XRAY VIEW OF THE CHEST 2/15/2021 8:29 am COMPARISON: None. HISTORY: ORDERING SYSTEM PROVIDED HISTORY: Confirmation of course of NG/OG/NE tube and location of tip of tube TECHNOLOGIST PROVIDED HISTORY: Reason for exam:->Confirmation of course of NG/OG/NE tube and location of tip of tube Portable? ->Yes Abdomen film dated 11/05/2019 and portable chest dated 12/26/2020 FINDINGS: ABDOMEN: There is an NG tube with the tip in the distal stomach. There is mild distention of predominantly small bowel that could be due to ileus or bowel obstruction. Increased density is seen in the lateral aspect of the abdomen bilaterally suggesting the possibility of ascites. Surgical clips are seen in the right upper quadrant. No suspicious intra-abdominal calcifications are identified. The pelvis is excluded from the images. PORTABLE CHEST: There is an ET tube with the tip about 2 cm above the amanda. No acute infiltrate or pleural effusion is seen. The heart and mediastinum are not significantly changed and there is no evidence of vascular congestion. 1. ET tube in satisfactory position. No evidence of acute intrathoracic disease. 2. NG tube tip in the distal stomach. 3. Nonspecific small bowel distension that could be related to ileus or small-bowel obstruction. 4. Suspected ascites. Us Dup Lower Extremity Left Khurram    Result Date: 2/20/2021  EXAMINATION: DUPLEX VENOUS ULTRASOUND OF THE LEFT LOWER EXTREMITY 2/20/2021 9:31 am TECHNIQUE: Duplex ultrasound and Doppler images were obtained of the left lower extremity. COMPARISON: None. HISTORY: ORDERING SYSTEM PROVIDED HISTORY: EDEMA TECHNOLOGIST PROVIDED HISTORY: Reason for exam:->EDEMA What reading provider will be dictating this exam?->CRC FINDINGS: The visualized veins of the left lower extremity are patent and free of echogenic thrombus. The veins are normally compressible and have normal phasic flow. No evidence of DVT in the left lower extremity.     Xr Abdomen For Ng/og/ne Tube Placement    Result Date: 2/15/2021 EXAMINATION: ONE SUPINE XRAY VIEW(S) OF THE ABDOMEN; ONE XRAY VIEW OF THE CHEST 2/15/2021 8:29 am COMPARISON: None. HISTORY: ORDERING SYSTEM PROVIDED HISTORY: Confirmation of course of NG/OG/NE tube and location of tip of tube TECHNOLOGIST PROVIDED HISTORY: Reason for exam:->Confirmation of course of NG/OG/NE tube and location of tip of tube Portable? ->Yes Abdomen film dated 11/05/2019 and portable chest dated 12/26/2020 FINDINGS: ABDOMEN: There is an NG tube with the tip in the distal stomach. There is mild distention of predominantly small bowel that could be due to ileus or bowel obstruction. Increased density is seen in the lateral aspect of the abdomen bilaterally suggesting the possibility of ascites. Surgical clips are seen in the right upper quadrant. No suspicious intra-abdominal calcifications are identified. The pelvis is excluded from the images. PORTABLE CHEST: There is an ET tube with the tip about 2 cm above the amanda. No acute infiltrate or pleural effusion is seen. The heart and mediastinum are not significantly changed and there is no evidence of vascular congestion. 1. ET tube in satisfactory position. No evidence of acute intrathoracic disease. 2. NG tube tip in the distal stomach. 3. Nonspecific small bowel distension that could be related to ileus or small-bowel obstruction. 4. Suspected ascites. ASSESEMENT:  1. Left leg bullous lesions - clinical signs of infection, possible underlying infectious process - deroofed by Dr. Maria Antonia Zee bedside yesterday  2. Left leg cellulitis  3. Left leg edema  4. Type I diabetes, uncontrolled  5. DKA  6. Sepsis  7. Pneumonia  8.  UTI    Principal Problem:    Cardiac arrest [facility])    Active Problems:    Diabetic ketoacidosis with coma associated with type 1 diabetes mellitus ([address]    UTI (urinary tract infection)    Diabetes mellitus type 1, uncontrolled ([address]    Sepsis ([address]    Hypertension    Acute respiratory failure with hypoxia ([address] ESRD on peritoneal dialysis ([facility]    Shock liver    Bacterial pneumonia       PLAN:  -Spoke with Dr. Don Spencer today regarding status of wound and nature of visit yesterday. Dr. Michoacano bairdoofed the largest central blister yesterday and the cx was taken from the resulting fluid. Pictures added to the chart preoperatively. -XR reviewed. No osseous pathology noted. No ST emphysema. -Gram stain and prelim cx (from central bullae fluid of L leg) show no growth. -ESR/CRP/procal elevated - likely leg is not primary cause of elevation but a contributing factor. -Abx per IM/CC teams. Vanc/meropenem now. -Dressings: Xeroform DSD -> post op - Wound vac, as per ordered.  -Plans for surgery: Will take to OR tomorrow for \"Left leg I&D\". Time pending (either morning or noon).  NPO placed, consent entered.  -Will continue to follow while in hospital.    DW: Rose Correa DPM FACFAS  Fellowship-Trained Foot and Ankle Surgeon  Diplomate, American Board of Foot and Ankle Surgeons  [phone]

## 2021-02-23 NOTE — PROGRESS NOTES
Physical Therapy Treatment Note    Room #:  OR POOL/NONE  Patient Name: Patrice Liu  YOB: 1990  MRN: [id number]    Referring Provider:   Benito Simmons MD     Date of Service: 2021    Evaluating Physical Therapist: Fatmata Rushing, PT #[id number]       Diagnosis:   Cardiac arrest ([facility] [I46.9]    unresponsiveness.  cardiac arrest    Patient Active Problem List   Diagnosis    High-risk pregnancy    Diabetic ketoacidosis with coma associated with type 1 diabetes mellitus ([address]    Previous stillbirth or demise, antepartum    Non compliance w medication regimen    UTI (urinary tract infection)    Tobacco smoking complicating pregnancy    Drug use complicating pregnancy in third trimester    MTHFR mutation ([address]    Poorly controlled type 1 diabetes mellitus ([address]    Diabetes mellitus type 1, uncontrolled ([address]    Previous  delivery affecting pregnancy, antepartum    Opioid abuse, episodic ([address]    Tobacco use    Sepsis ([address]    Hypoglycemia    Hypertension    Type 1 diabetes mellitus with other specified complication ([address]    Diabetic gastroparesis associated with type 1 diabetes mellitus (HCC)    Iron deficiency anemia    Acute respiratory failure with hypoxia (HCC)    Sinus tachycardia    Bladder dysfunction    Acute heart failure with preserved ejection fraction (HCC)    Chronic kidney disease    Severe protein-calorie malnutrition ([address]    ESRD (end stage renal disease) ([address]    Uncontrolled type 1 diabetes mellitus with hyperglycemia, with long-term current use of insulin ([address]    ESRD on peritoneal dialysis ([address]    Hypothyroidism    Hyperlipidemia    Acute cystitis without hematuria    Nondisplaced fracture of neck of fifth metacarpal bone, left hand, initial encounter for closed fracture    Acute encephalopathy    Effusion of left knee    Acute metabolic encephalopathy    Chronic right-sided low back pain    Endocarditis    Chronic diarrhea  Valvular endocarditis    Hyperosmolar hyperglycemic state (HHS) ([address]    Seizure ([address]    Hypothermia    Hyperkalemia    Diabetic polyneuropathy associated with type 1 diabetes mellitus ([address]    Cardiac arrest ([address]    Shock liver    Bacterial pneumonia        Tentative placement recommendation: Subacute rehab    Equipment recommendation:  To be determined      Prior Level of Function: Patient ambulated independently    Rehab Potential: fair    for baseline    Past medical history:   Past Medical History:   Diagnosis Date    Acute congestive heart failure ([address]     BC (acute kidney injury) ([address] 10/1/2019    Cephalgia 10/9/2019    Chronic kidney disease     Depression     Diabetes mellitus ([address]     Diabetic ketoacidosis ([address] 2011    Hemod[facility]     History of blood transfusion 2019    Hyperlipidemia 10/8/2020    Hypothyroidism 10/8/2020    Iron deficiency anemia 10/1/2019    MDRO (multiple drug resistant organisms) resistance     MRSA (methicillin resistant Staphylococcus aureus)     back wound abcess    Non compliance w medication regimen 3/30/2016    Other disorders of kidney and ureter     Pregnancy 3/30/2016    16 weeks    Seizure ([address] 2020    Severe pre-eclampsia in third trimester 2016    Shock liver 2/15/2021     Past Surgical History:   Procedure Laterality Date    BACK SURGERY      abscess     SECTION      x2    CHOLECYSTECTOMY, LAPAROSCOPIC N/A 2019    CHOLECYSTECTOMY LAPAROSCOPIC performed by Isacc Blanco MD at [address] N/A 2018    COLONOSCOPY WITH BIOPSY performed by Jayashree Sparks MD at [facility] N/A 2018    COLONOSCOPY WITH BIOPSY performed by Tenisha Oh MD at [address] [place] [address] ECHO COMPL [address]  2012    EF 57%    ECHO COMPL W DOP COLOR FLOW  6/10/2013         EMBOLECTOMY N/A 2020 2/23/2021     Short Term/ Long Term   Goals   Was pt agreeable to Eval/treatment? Yes   yes To be met in 5 days   Pain level   Unable to rate c/o back pain    Not rated,  all over    Bed Mobility    Rolling: Moderate assist of 1    Supine to sit: Maximal assist of 1    Sit to supine: Maximal assist of 1    Scooting: Maximal assist of 1   Rolling: Not assessed    Supine to sit: Not assessed    Sit to supine: Not assessed    Scooting: Not assessed     Rolling: Minimal assist of 1    Supine to sit: Minimal assist of 1    Sit to supine: Minimal assist of 1    Scooting: Minimal assist of 1     Transfers Sit to stand: Not assessed  too lethargic Sit to stand: Not assessed      Sit to stand:  Moderate assist of 1     Ambulation    not assessed   not assessed  10 feet using  wheeled walker with Moderate assist of 1    Stair negotiation: ascended and descended   Not assessed            ROM Within functional limits        Strength BUE:  3-/5  RLE:  3-/5  LLE:  3-/5   Increase strength in affected mm groups by 1/3 grade   Balance Sitting EOB:  poor moderate assist throughout right lateral lean maximum cueing for upright  Dynamic Standing:  not assessed   Sitting EOB: not assessed  Dynamic Standing: not assessed    Sitting EOB:  fair +  Dynamic Standing: fair wheeled walker      Patient is Alert & Oriented x person and follows one step directions 50%  Sensation:  Patient  denies numbness and tingling     Edema:  yes bilateral lower extremities and bilateral upper extremities slight  Endurance: poor      Vitals:     Blood Pressure at rest  Blood Pressure during session    Heart Rate at rest 113 Heart Rate during session     SPO2 at rest 93% SPO2 during session      Patient education Patient educated on role of Physical Therapy, risks of immobility, safety and plan of care,  importance of mobility while in hospital , ankle pumps, quad set and glut set for edema control, blood clot prevention and positioning for skin integrity and comfort      Patient response to education:   Pt verbalized understanding Pt demonstrated skill Pt requires further education in this area   Yes Partial Yes      Treatment:  Patient practiced and was instructed/facilitated in the following treatment:. Performed LE exercises. Therapeutic Exercises:  ankle pumps, heel slide, hip abduction/adduction and straight leg raise  x 15-20 reps. Active assisted ex with cues for increased participation       At end of session, patient in bed with alarm call light and phone within reach,   all lines and tubes intact, nursing notified. Patient would benefit from continued skilled Physical Therapy to improve functional independence and quality of life.           Patient's/ family goals   none stated        ASSESSMENT: Patient exhibits decreased strength, balance and endurance impairing functional mobility, transfers, gait , tolerance to activity and participation Alert and able to participate with ex with time and encouragement   Plan of Care:     -Bed Mobility: Lower extremity exercises  and Upper extremity exercises   -Sitting Balance: Hands on support to maintain midline , Facilitate active trunk muscle engagement  and Facilitate postural control in all planes   -Standing Balance: Perform strengthening exercises in standing to promote motor control with or without upper extremity support  and Instruct patient on adequate base of support to maintain balance

## 2021-02-23 NOTE — ANESTHESIA POSTPROCEDURE EVALUATION
Department of Anesthesiology  Postprocedure Note    Patient: Neil Hoang  MRN: [id number]  YOB: 1990  Date of evaluation: 2/23/2021  Time:  6:52 PM     Procedure Summary     Date: 02/23/21 Room / Location: [address] [place] [address] / [address] [place] [address]    Anesthesia Start: 1628 Anesthesia Stop: 1703    Procedure: LEFT LEG INCISION AND DRAINAGE, DEBRIDEMENT, WOUND VAC APPLICATION (Left Leg Lower) Diagnosis: (CELLULITIS LEFT LEG)    Surgeons: Ally Watson DPM Responsible Provider: Allen Pascal MD    Anesthesia Type: MAC ASA Status: 4          Anesthesia Type: MAC    Shilpa Phase I: Shilpa Score: 9    Shilpa Phase II: Shilpa Score: 9    Last vitals: Reviewed and per EMR flowsheets.        Anesthesia Post Evaluation    Patient location during evaluation: PACU  Patient participation: complete - patient participated  Level of consciousness: awake  Airway patency: patent  Nausea & Vomiting: no nausea and no vomiting  Complications: no  Cardiovascular status: hemodynamically stable  Respiratory status: acceptable  Hydration status: stable

## 2021-02-23 NOTE — BRIEF OP NOTE
Brief Postoperative Note      Patient: Jewell Christianson  YOB: 1990  MRN: [id number]    Date of Procedure: 2/23/2021    Pre-Op Diagnosis: CELLULITIS LEFT LEG, ulcer left leg with necrosis of muscle    Post-Op Diagnosis: Same       Procedure(s):  LEFT LEG INCISION AND DRAINAGE, DEBRIDEMENT, WOUND VAC APPLICATION    Surgeon(s):  Costa Núñez DPM    Assistant:  * No surgical staff found *    Anesthesia: General    Estimated Blood Loss (mL): Minimal    Complications: None    Specimens:   ID Type Source Tests Collected by Time Destination   1 : Culture left leg wound Tissue Tissue CULTURE, ANAEROBIC, CULTURE, FUNGUS, GRAM STAIN, CULTURE, SURGICAL Costa Núñez DPM 2/23/2021 1644    A : TISSUE LEFT LEG Tissue Tissue SURGICAL PATHOLOGY Shasta Escobar DPM 2/23/2021 1647        Implants:  * No implants in log *      Drains:   [REMOVED] NG/OG/NJ/NE Tube Orogastric Center mouth (Removed)   Surrounding Skin Dry 02/16/21 0800   Securement device Yes 02/16/21 0800   Status Suction-low intermittent 02/16/21 0800   Placement Verified by X-Ray (Initial) 02/16/21 0800   NG/OG/NJ/NE External Measurement (cm) 65 cm 02/16/21 0800   Drainage Appearance Bile 02/16/21 0800   Free Water Flush (mL) 30 mL 02/15/21 2215       [REMOVED] Urethral Catheter Temperature probe 16 fr (Removed)   Catheter Indications Need for fluid management in critically ill patients in a critical care setting not able to be managed by other means such as BSC with hat, bedpan, urinal, condom catheter, or short term intermittent urethral catherization 02/17/21 0500   Urine Color Straw 02/17/21 0500   Urine Appearance Sediment 02/17/21 0500   Output (mL) 60 mL 02/17/21 0800       [REMOVED] Urethral Catheter Temperature probe 16 fr (Removed)       Findings: Necrosis of soft tissue consistent with necrotizing infection.     Electronically signed by Shasta Escobar DPM on 2/23/2021 at 5:01 PM

## 2021-02-24 ENCOUNTER — APPOINTMENT (OUTPATIENT)
Dept: GENERAL RADIOLOGY | Age: 31
DRG: 710 | End: 2021-02-24
Payer: COMMERCIAL

## 2021-02-24 LAB
ALBUMIN SERPL-MCNC: 2.3 G/DL (ref 3.5–5.2)
ALP BLD-CCNC: 208 U/L (ref 35–104)
ALT SERPL-CCNC: 18 U/L (ref 0–32)
ANION GAP SERPL CALCULATED.3IONS-SCNC: 12 MMOL/L (ref 7–16)
ANION GAP SERPL CALCULATED.3IONS-SCNC: 14 MMOL/L (ref 7–16)
AST SERPL-CCNC: 18 U/L (ref 0–31)
BILIRUB SERPL-MCNC: <0.2 MG/DL (ref 0–1.2)
BLOOD CULTURE, ROUTINE: NORMAL
BUN BLDV-MCNC: 41 MG/DL (ref 6–20)
BUN BLDV-MCNC: 43 MG/DL (ref 6–20)
CALCIUM SERPL-MCNC: 8.2 MG/DL (ref 8.6–10.2)
CALCIUM SERPL-MCNC: 8.4 MG/DL (ref 8.6–10.2)
CHLORIDE BLD-SCNC: 100 MMOL/L (ref 98–107)
CHLORIDE BLD-SCNC: 99 MMOL/L (ref 98–107)
CO2: 22 MMOL/L (ref 22–29)
CO2: 22 MMOL/L (ref 22–29)
CREAT SERPL-MCNC: 8 MG/DL (ref 0.5–1)
CREAT SERPL-MCNC: 8 MG/DL (ref 0.5–1)
CULTURE, BLOOD 2: NORMAL
GFR AFRICAN AMERICAN: 7
GFR AFRICAN AMERICAN: 7
GFR NON-AFRICAN AMERICAN: 7 ML/MIN/1.73
GFR NON-AFRICAN AMERICAN: 7 ML/MIN/1.73
GLUCOSE BLD-MCNC: 114 MG/DL (ref 74–99)
GLUCOSE BLD-MCNC: 125 MG/DL (ref 74–99)
GRAM STAIN ORDERABLE: NORMAL
HCT VFR BLD CALC: 24.4 % (ref 34–48)
HEMOGLOBIN: 7.7 G/DL (ref 11.5–15.5)
MAGNESIUM: 1.9 MG/DL (ref 1.6–2.6)
MAGNESIUM: 1.9 MG/DL (ref 1.6–2.6)
MCH RBC QN AUTO: 29.2 PG (ref 26–35)
MCHC RBC AUTO-ENTMCNC: 31.6 % (ref 32–34.5)
MCV RBC AUTO: 92.4 FL (ref 80–99.9)
METER GLUCOSE: 105 MG/DL (ref 74–99)
METER GLUCOSE: 127 MG/DL (ref 74–99)
METER GLUCOSE: 136 MG/DL (ref 74–99)
METER GLUCOSE: 136 MG/DL (ref 74–99)
METER GLUCOSE: 140 MG/DL (ref 74–99)
METER GLUCOSE: 147 MG/DL (ref 74–99)
METER GLUCOSE: 151 MG/DL (ref 74–99)
METER GLUCOSE: 157 MG/DL (ref 74–99)
METER GLUCOSE: 162 MG/DL (ref 74–99)
METER GLUCOSE: 178 MG/DL (ref 74–99)
METER GLUCOSE: 195 MG/DL (ref 74–99)
METER GLUCOSE: 238 MG/DL (ref 74–99)
METER GLUCOSE: 310 MG/DL (ref 74–99)
METER GLUCOSE: 90 MG/DL (ref 74–99)
METER GLUCOSE: 92 MG/DL (ref 74–99)
PDW BLD-RTO: 16.3 FL (ref 11.5–15)
PHOSPHORUS: 5.8 MG/DL (ref 2.5–4.5)
PHOSPHORUS: 6 MG/DL (ref 2.5–4.5)
PLATELET # BLD: 420 E9/L (ref 130–450)
PMV BLD AUTO: 10.1 FL (ref 7–12)
POTASSIUM SERPL-SCNC: 4.2 MMOL/L (ref 3.5–5)
POTASSIUM SERPL-SCNC: 4.7 MMOL/L (ref 3.5–5)
RBC # BLD: 2.64 E12/L (ref 3.5–5.5)
SODIUM BLD-SCNC: 134 MMOL/L (ref 132–146)
SODIUM BLD-SCNC: 135 MMOL/L (ref 132–146)
TOTAL PROTEIN: 5.4 G/DL (ref 6.4–8.3)
VANCOMYCIN RANDOM: 17.8 MCG/ML (ref 5–40)
WBC # BLD: 8.6 E9/L (ref 4.5–11.5)
WOUND/ABSCESS: NORMAL

## 2021-02-24 PROCEDURE — 80048 BASIC METABOLIC PNL TOTAL CA: CPT

## 2021-02-24 PROCEDURE — 6370000000 HC RX 637 (ALT 250 FOR IP): Performed by: INTERNAL MEDICINE

## 2021-02-24 PROCEDURE — 83735 ASSAY OF MAGNESIUM: CPT

## 2021-02-24 PROCEDURE — 2580000003 HC RX 258: Performed by: STUDENT IN AN ORGANIZED HEALTH CARE EDUCATION/TRAINING PROGRAM

## 2021-02-24 PROCEDURE — 2060000000 HC ICU INTERMEDIATE R&B

## 2021-02-24 PROCEDURE — 2580000003 HC RX 258: Performed by: INTERNAL MEDICINE

## 2021-02-24 PROCEDURE — 97110 THERAPEUTIC EXERCISES: CPT

## 2021-02-24 PROCEDURE — 82962 GLUCOSE BLOOD TEST: CPT

## 2021-02-24 PROCEDURE — 84100 ASSAY OF PHOSPHORUS: CPT

## 2021-02-24 PROCEDURE — 85027 COMPLETE CBC AUTOMATED: CPT

## 2021-02-24 PROCEDURE — 94640 AIRWAY INHALATION TREATMENT: CPT

## 2021-02-24 PROCEDURE — 2700000000 HC OXYGEN THERAPY PER DAY

## 2021-02-24 PROCEDURE — 80202 ASSAY OF VANCOMYCIN: CPT

## 2021-02-24 PROCEDURE — 80053 COMPREHEN METABOLIC PANEL: CPT

## 2021-02-24 PROCEDURE — 6360000002 HC RX W HCPCS: Performed by: INTERNAL MEDICINE

## 2021-02-24 PROCEDURE — 94660 CPAP INITIATION&MGMT: CPT

## 2021-02-24 PROCEDURE — 2500000003 HC RX 250 WO HCPCS: Performed by: STUDENT IN AN ORGANIZED HEALTH CARE EDUCATION/TRAINING PROGRAM

## 2021-02-24 PROCEDURE — 71045 X-RAY EXAM CHEST 1 VIEW: CPT

## 2021-02-24 PROCEDURE — 6370000000 HC RX 637 (ALT 250 FOR IP): Performed by: STUDENT IN AN ORGANIZED HEALTH CARE EDUCATION/TRAINING PROGRAM

## 2021-02-24 PROCEDURE — 99233 SBSQ HOSP IP/OBS HIGH 50: CPT | Performed by: INTERNAL MEDICINE

## 2021-02-24 PROCEDURE — 97530 THERAPEUTIC ACTIVITIES: CPT

## 2021-02-24 PROCEDURE — 90945 DIALYSIS ONE EVALUATION: CPT

## 2021-02-24 PROCEDURE — 6360000002 HC RX W HCPCS: Performed by: STUDENT IN AN ORGANIZED HEALTH CARE EDUCATION/TRAINING PROGRAM

## 2021-02-24 PROCEDURE — 6360000002 HC RX W HCPCS: Performed by: ORTHOPAEDIC SURGERY

## 2021-02-24 RX ORDER — LIDOCAINE 4 G/G
1 PATCH TOPICAL DAILY
Status: DISCONTINUED | OUTPATIENT
Start: 2021-02-24 | End: 2021-02-27 | Stop reason: HOSPADM

## 2021-02-24 RX ORDER — FENTANYL CITRATE 50 UG/ML
50 INJECTION, SOLUTION INTRAMUSCULAR; INTRAVENOUS
Status: DISCONTINUED | OUTPATIENT
Start: 2021-02-24 | End: 2021-02-25

## 2021-02-24 RX ORDER — INSULIN GLARGINE 100 [IU]/ML
3 INJECTION, SOLUTION SUBCUTANEOUS 2 TIMES DAILY
Status: DISCONTINUED | OUTPATIENT
Start: 2021-02-24 | End: 2021-02-27 | Stop reason: HOSPADM

## 2021-02-24 RX ORDER — FENTANYL CITRATE 50 UG/ML
25 INJECTION, SOLUTION INTRAMUSCULAR; INTRAVENOUS
Status: DISCONTINUED | OUTPATIENT
Start: 2021-02-24 | End: 2021-02-25

## 2021-02-24 RX ADMIN — PANTOPRAZOLE SODIUM 40 MG: 40 TABLET, DELAYED RELEASE ORAL at 05:09

## 2021-02-24 RX ADMIN — DEXTROSE AND SODIUM CHLORIDE: 5; 450 INJECTION, SOLUTION INTRAVENOUS at 05:14

## 2021-02-24 RX ADMIN — FENTANYL CITRATE 25 MCG: 50 INJECTION INTRAMUSCULAR; INTRAVENOUS at 14:34

## 2021-02-24 RX ADMIN — CALCIUM ACETATE 1334 MG: 667 CAPSULE ORAL at 16:54

## 2021-02-24 RX ADMIN — POTASSIUM CHLORIDE 10 MEQ: 7.46 INJECTION, SOLUTION INTRAVENOUS at 05:11

## 2021-02-24 RX ADMIN — Medication 10 ML: at 21:00

## 2021-02-24 RX ADMIN — MEROPENEM 500 MG: 500 INJECTION, POWDER, FOR SOLUTION INTRAVENOUS at 10:44

## 2021-02-24 RX ADMIN — INSULIN LISPRO 2 UNITS: 100 INJECTION, SOLUTION INTRAVENOUS; SUBCUTANEOUS at 19:52

## 2021-02-24 RX ADMIN — FENTANYL CITRATE 25 MCG: 50 INJECTION INTRAMUSCULAR; INTRAVENOUS at 09:37

## 2021-02-24 RX ADMIN — POTASSIUM CHLORIDE 10 MEQ: 7.46 INJECTION, SOLUTION INTRAVENOUS at 04:02

## 2021-02-24 RX ADMIN — POTASSIUM CHLORIDE 10 MEQ: 7.46 INJECTION, SOLUTION INTRAVENOUS at 00:17

## 2021-02-24 RX ADMIN — POTASSIUM CHLORIDE 10 MEQ: 7.46 INJECTION, SOLUTION INTRAVENOUS at 03:02

## 2021-02-24 RX ADMIN — Medication 1000 UNITS: at 09:08

## 2021-02-24 RX ADMIN — FENTANYL CITRATE 25 MCG: 50 INJECTION INTRAMUSCULAR; INTRAVENOUS at 04:58

## 2021-02-24 RX ADMIN — SODIUM CHLORIDE, PRESERVATIVE FREE 10 ML: 5 INJECTION INTRAVENOUS at 09:07

## 2021-02-24 RX ADMIN — FENTANYL CITRATE 50 MCG: 50 INJECTION INTRAMUSCULAR; INTRAVENOUS at 19:05

## 2021-02-24 RX ADMIN — FENTANYL CITRATE 25 MCG: 50 INJECTION INTRAMUSCULAR; INTRAVENOUS at 00:55

## 2021-02-24 RX ADMIN — IPRATROPIUM BROMIDE AND ALBUTEROL SULFATE 1 AMPULE: .5; 3 SOLUTION RESPIRATORY (INHALATION) at 16:54

## 2021-02-24 RX ADMIN — LEVOTHYROXINE SODIUM 75 MCG: 75 TABLET ORAL at 05:09

## 2021-02-24 RX ADMIN — METOPROLOL TARTRATE 25 MG: 25 TABLET, FILM COATED ORAL at 09:08

## 2021-02-24 RX ADMIN — HEPARIN SODIUM 5000 UNITS: 5000 INJECTION INTRAVENOUS; SUBCUTANEOUS at 22:00

## 2021-02-24 RX ADMIN — FENTANYL CITRATE 50 MCG: 50 INJECTION INTRAMUSCULAR; INTRAVENOUS at 15:52

## 2021-02-24 RX ADMIN — INSULIN LISPRO 1 UNITS: 100 INJECTION, SOLUTION INTRAVENOUS; SUBCUTANEOUS at 12:02

## 2021-02-24 RX ADMIN — HEPARIN SODIUM 5000 UNITS: 5000 INJECTION INTRAVENOUS; SUBCUTANEOUS at 15:01

## 2021-02-24 RX ADMIN — CALCIUM ACETATE 1334 MG: 667 CAPSULE ORAL at 09:08

## 2021-02-24 RX ADMIN — IPRATROPIUM BROMIDE AND ALBUTEROL SULFATE 1 AMPULE: .5; 3 SOLUTION RESPIRATORY (INHALATION) at 05:08

## 2021-02-24 RX ADMIN — OXYCODONE AND ACETAMINOPHEN 1 TABLET: 5; 325 TABLET ORAL at 12:00

## 2021-02-24 RX ADMIN — INSULIN GLARGINE 3 UNITS: 100 INJECTION, SOLUTION SUBCUTANEOUS at 10:14

## 2021-02-24 RX ADMIN — INSULIN LISPRO 2 UNITS: 100 INJECTION, SOLUTION INTRAVENOUS; SUBCUTANEOUS at 16:54

## 2021-02-24 RX ADMIN — POTASSIUM CHLORIDE 10 MEQ: 7.46 INJECTION, SOLUTION INTRAVENOUS at 01:25

## 2021-02-24 RX ADMIN — VANCOMYCIN HYDROCHLORIDE 1000 MG: 1 INJECTION, POWDER, LYOPHILIZED, FOR SOLUTION INTRAVENOUS at 10:44

## 2021-02-24 RX ADMIN — Medication 10 ML: at 09:07

## 2021-02-24 RX ADMIN — INSULIN GLARGINE 3 UNITS: 100 INJECTION, SOLUTION SUBCUTANEOUS at 19:51

## 2021-02-24 RX ADMIN — HEPARIN SODIUM 5000 UNITS: 5000 INJECTION INTRAVENOUS; SUBCUTANEOUS at 05:30

## 2021-02-24 RX ADMIN — CALCIUM ACETATE 1334 MG: 667 CAPSULE ORAL at 12:00

## 2021-02-24 RX ADMIN — METOPROLOL TARTRATE 25 MG: 25 TABLET, FILM COATED ORAL at 19:51

## 2021-02-24 RX ADMIN — IPRATROPIUM BROMIDE AND ALBUTEROL SULFATE 1 AMPULE: .5; 3 SOLUTION RESPIRATORY (INHALATION) at 13:10

## 2021-02-24 RX ADMIN — EPOETIN ALFA-EPBX 3000 UNITS: 3000 INJECTION, SOLUTION INTRAVENOUS; SUBCUTANEOUS at 09:08

## 2021-02-24 RX ADMIN — EPOETIN ALFA-EPBX 2000 UNITS: 2000 INJECTION, SOLUTION INTRAVENOUS; SUBCUTANEOUS at 09:08

## 2021-02-24 RX ADMIN — OXYCODONE AND ACETAMINOPHEN 2 TABLET: 5; 325 TABLET ORAL at 17:19

## 2021-02-24 ASSESSMENT — PAIN DESCRIPTION - FREQUENCY
FREQUENCY: CONTINUOUS

## 2021-02-24 ASSESSMENT — PAIN DESCRIPTION - LOCATION
LOCATION: CHEST
LOCATION: CHEST

## 2021-02-24 ASSESSMENT — PAIN DESCRIPTION - ORIENTATION
ORIENTATION: MID

## 2021-02-24 ASSESSMENT — PAIN DESCRIPTION - ONSET
ONSET: SUDDEN
ONSET: ON-GOING

## 2021-02-24 ASSESSMENT — PAIN SCALES - GENERAL
PAINLEVEL_OUTOF10: 6
PAINLEVEL_OUTOF10: 0
PAINLEVEL_OUTOF10: 7
PAINLEVEL_OUTOF10: 5

## 2021-02-24 ASSESSMENT — PAIN DESCRIPTION - PROGRESSION
CLINICAL_PROGRESSION: NOT CHANGED
CLINICAL_PROGRESSION: GRADUALLY WORSENING
CLINICAL_PROGRESSION: NOT CHANGED
CLINICAL_PROGRESSION: RAPIDLY WORSENING

## 2021-02-24 ASSESSMENT — PAIN - FUNCTIONAL ASSESSMENT
PAIN_FUNCTIONAL_ASSESSMENT: PREVENTS OR INTERFERES WITH ALL ACTIVE AND SOME PASSIVE ACTIVITIES
PAIN_FUNCTIONAL_ASSESSMENT: INTOLERABLE, UNABLE TO DO ANY ACTIVE OR PASSIVE ACTIVITIES

## 2021-02-24 ASSESSMENT — PAIN DESCRIPTION - PAIN TYPE: TYPE: CHRONIC PAIN

## 2021-02-24 NOTE — PROGRESS NOTES
[employer] disconnected and staysafe cap applied using aseptic technique 1960 total UF. Clear pale yellow effluent .  Dressind dry intact

## 2021-02-24 NOTE — PROGRESS NOTES
dextrose, fentanNYL, oxyCODONE-acetaminophen **OR** oxyCODONE-acetaminophen, perflutren lipid microspheres, sodium chloride, glucose, dextrose, glucagon (rDNA), dextrose, sodium chloride flush, polyethylene glycol, acetaminophen **OR** acetaminophen      REVIEW OF SYSTEMS:  Patient refuses to answer any questions regarding review of systems  OBJECTIVE:  Vitals:    02/24/21 1400   BP:    Pulse: 103   Resp: 22   Temp:    SpO2: 95%     FiO2 : 50 %  O2 Flow Rate (L/min): 4 L/min  O2 Device: Nasal cannula    PHYSICAL EXAM:  Constitutional: No fever, chills, diaphoresis  Skin: No skin rash, no skin breakdown. Left leg wound is dressed  HEENT: Unremarkable  Neck: No JVD, lymphadenopathy, thyromegaly  Cardiovascular: S1, S2 normal.  No S3 murmurs or rubs present  Respiratory: Few scattered inspiratory crackles posteriorly. No wheezes are noted. No pleural rubs are heard  Gastrointestinal: Soft, flat, nontender  Genitourinary: No CVA tenderness  Extremities: No clubbing, cyanosis, or edema  Neurological: Moves all extremities. No focal motor deficits detected.   No asterixis  Psychological: Flat affect; seems always to be depressed    LABS:  WBC   Date Value Ref Range Status   02/24/2021 8.6 4.5 - 11.5 E9/L Final   02/23/2021 8.0 4.5 - 11.5 E9/L Final   02/22/2021 8.6 4.5 - 11.5 E9/L Final     Hemoglobin   Date Value Ref Range Status   02/24/2021 7.7 (L) 11.5 - 15.5 g/dL Final   02/23/2021 8.0 (L) 11.5 - 15.5 g/dL Final   02/22/2021 8.0 (L) 11.5 - 15.5 g/dL Final     Hematocrit   Date Value Ref Range Status   02/24/2021 24.4 (L) 34.0 - 48.0 % Final   02/23/2021 25.4 (L) 34.0 - 48.0 % Final   02/22/2021 24.6 (L) 34.0 - 48.0 % Final     MCV   Date Value Ref Range Status   02/24/2021 92.4 80.0 - 99.9 fL Final   02/23/2021 93.4 80.0 - 99.9 fL Final   02/22/2021 89.8 80.0 - 99.9 fL Final     Platelets   Date Value Ref Range Status   02/24/2021 420 130 - 450 E9/L Final   02/23/2021 366 130 - 450 E9/L Final 02/22/2021 328 130 - 450 E9/L Final     Sodium   Date Value Ref Range Status   02/24/2021 135 132 - 146 mmol/L Final   02/24/2021 134 132 - 146 mmol/L Final   02/23/2021 137 132 - 146 mmol/L Final     Potassium   Date Value Ref Range Status   02/24/2021 4.7 3.5 - 5.0 mmol/L Final   02/24/2021 4.2 3.5 - 5.0 mmol/L Final   02/23/2021 3.7 3.5 - 5.0 mmol/L Final     Potassium reflex Magnesium   Date Value Ref Range Status   02/15/2021 5.4 (H) 3.5 - 5.0 mmol/L Final   01/24/2021 5.7 (H) 3.5 - 5.0 mmol/L Final     Comment:     Specimen is moderately Hemolyzed. Result may be artificially increased. 12/28/2020 6.0 (H) 3.5 - 5.0 mmol/L Final     Comment:     Specimen is moderately Hemolyzed. Result may be artificially increased.      Chloride   Date Value Ref Range Status   02/24/2021 99 98 - 107 mmol/L Final   02/24/2021 100 98 - 107 mmol/L Final   02/23/2021 100 98 - 107 mmol/L Final     CO2   Date Value Ref Range Status   02/24/2021 22 22 - 29 mmol/L Final   02/24/2021 22 22 - 29 mmol/L Final   02/23/2021 22 22 - 29 mmol/L Final     BUN   Date Value Ref Range Status   02/24/2021 41 (H) 6 - 20 mg/dL Final   02/24/2021 43 (H) 6 - 20 mg/dL Final   02/23/2021 47 (H) 6 - 20 mg/dL Final     CREATININE   Date Value Ref Range Status   02/24/2021 8.0 (H) 0.5 - 1.0 mg/dL Final   02/24/2021 8.0 (H) 0.5 - 1.0 mg/dL Final   02/23/2021 8.6 (HH) 0.5 - 1.0 mg/dL Final     Glucose   Date Value Ref Range Status   02/24/2021 125 (H) 74 - 99 mg/dL Final   02/24/2021 114 (H) 74 - 99 mg/dL Final   02/23/2021 138 (H) 74 - 99 mg/dL Final   05/18/2012 130 (H) 70 - 110 mg/dL Final   04/26/2012 61 (L) 70 - 110 mg/dL Final   04/25/2012 196 (H) 70 - 110 mg/dL Final     Calcium   Date Value Ref Range Status   02/24/2021 8.4 (L) 8.6 - 10.2 mg/dL Final   02/24/2021 8.2 (L) 8.6 - 10.2 mg/dL Final   02/23/2021 8.4 (L) 8.6 - 10.2 mg/dL Final     Total Protein   Date Value Ref Range Status   02/24/2021 5.4 (L) 6.4 - 8.3 g/dL Final 02/23/2021 5.8 (L) 6.4 - 8.3 g/dL Final   02/22/2021 5.9 (L) 6.4 - 8.3 g/dL Final     Albumin   Date Value Ref Range Status   02/24/2021 2.3 (L) 3.5 - 5.2 g/dL Final   02/23/2021 2.7 (L) 3.5 - 5.2 g/dL Final   02/22/2021 2.6 (L) 3.5 - 5.2 g/dL Final   05/18/2012 4.1 3.2 - 4.8 g/dL Final   04/23/2012 4.0 3.2 - 4.8 g/dL Final   04/02/2012 4.8 3.2 - 4.8 g/dL Final     Total Bilirubin   Date Value Ref Range Status   02/24/2021 <0.2 0.0 - 1.2 mg/dL Final   02/23/2021 <0.2 0.0 - 1.2 mg/dL Final   02/22/2021 0.3 0.0 - 1.2 mg/dL Final     Alkaline Phosphatase   Date Value Ref Range Status   02/24/2021 208 (H) 35 - 104 U/L Final   02/23/2021 259 (H) 35 - 104 U/L Final   02/22/2021 241 (H) 35 - 104 U/L Final     AST   Date Value Ref Range Status   02/24/2021 18 0 - 31 U/L Final     Comment:     Specimen is slightly Hemolyzed. Result may be artificially increased. 02/23/2021 14 0 - 31 U/L Final   02/22/2021 17 0 - 31 U/L Final     Comment:     Specimen is slightly Hemolyzed. Result may be artificially increased. ALT   Date Value Ref Range Status   02/24/2021 18 0 - 32 U/L Final   02/23/2021 26 0 - 32 U/L Final   02/22/2021 38 (H) 0 - 32 U/L Final     GFR Non-   Date Value Ref Range Status   02/24/2021 7 >=60 mL/min/1.73 Final     Comment:     Chronic Kidney Disease: less than 60 ml/min/1.73 sq.m. Kidney Failure: less than 15 ml/min/1.73 sq.m. Results valid for patients 18 years and older. 02/24/2021 7 >=60 mL/min/1.73 Final     Comment:     Chronic Kidney Disease: less than 60 ml/min/1.73 sq.m. Kidney Failure: less than 15 ml/min/1.73 sq.m. Results valid for patients 18 years and older. 02/23/2021 7 >=60 mL/min/1.73 Final     Comment:     Chronic Kidney Disease: less than 60 ml/min/1.73 sq.m. Kidney Failure: less than 15 ml/min/1.73 sq.m. Results valid for patients 18 years and older.        GFR    Date Value Ref Range Status   02/24/2021 7  Final 02/24/2021 7  Final   02/23/2021 7  Final     Magnesium   Date Value Ref Range Status   02/24/2021 1.9 1.6 - 2.6 mg/dL Final   02/24/2021 1.9 1.6 - 2.6 mg/dL Final   02/23/2021 1.9 1.6 - 2.6 mg/dL Final     Phosphorus   Date Value Ref Range Status   02/24/2021 5.8 (H) 2.5 - 4.5 mg/dL Final   02/24/2021 6.0 (H) 2.5 - 4.5 mg/dL Final   02/23/2021 5.8 (H) 2.5 - 4.5 mg/dL Final     No results for input(s): PH, PO2, PCO2, HCO3, BE, O2SAT in the last 72 hours. RADIOLOGY:  XR CHEST PORTABLE   Preliminary Result   1. Stable cardiomegaly. 2. Patchy bilateral airspace disease unchanged when compared to the prior   study. XR CHEST PORTABLE   Final Result   Increased consolidation right upper lobe concerning for pneumonia or   developing atelectasis. Diffuse edema/ARDS unchanged. Suspect pulmonary venous hypertension. XR TIBIA FIBULA LEFT (2 VIEWS)   Final Result   No osseous abnormality. No discrete soft tissue abnormality. Mild diffuse edema      XR CHEST PORTABLE   Final Result   No change in bilateral airspace opacities. US DUP LOWER EXTREMITY LEFT RODO   Final Result   No evidence of DVT in the left lower extremity. XR CHEST PORTABLE   Final Result   1. No interval change in the multifocal bilateral diffuse hazy pulmonary   infiltrates. XR CHEST PORTABLE   Final Result   Persistent but perhaps slightly improved bilateral diffuse ill-defined   opacities left slightly greater than right. RECOMMENDATION:   Continued radiographic follow-up suggested. XR CHEST PORTABLE   Final Result   Improvement in right-sided opacities and worsening in left-sided airspace   opacities. Finding may represent pulmonary edema versus atelectasis. XR CHEST PORTABLE   Final Result   Extensive primarily right-sided pulmonary infiltrates new since the prior exam      CT HEAD WO CONTRAST   Final Result   No acute intracranial abnormality.       XR ABDOMEN FOR NG/OG/NE TUBE PLACEMENT   Final Result 1. ET tube in satisfactory position. No evidence of acute intrathoracic   disease. 2. NG tube tip in the distal stomach. 3. Nonspecific small bowel distension that could be related to ileus or   small-bowel obstruction. 4. Suspected ascites. XR CHEST PORTABLE   Final Result   1. ET tube in satisfactory position. No evidence of acute intrathoracic   disease. 2. NG tube tip in the distal stomach. 3. Nonspecific small bowel distension that could be related to ileus or   small-bowel obstruction. 4. Suspected ascites. VL KEY BILATERAL LIMITED 1-2 LEVELS    (Results Pending)           PROBLEM LIST:  Principal Problem:    Cardiac arrest ([address]  Active Problems:    Diabetic ketoacidosis with coma associated with type 1 diabetes mellitus ([address]    UTI (urinary tract infection)    Diabetes mellitus type 1, uncontrolled ([address]    Sepsis ([address]    Hypertension    Acute respiratory failure with hypoxia ([address]    ESRD on peritoneal dialysis ([address]    Shock liver    Bacterial pneumonia  Resolved Problems:    * No resolved hospital problems. *      IMPRESSION:  1. Diabetic ketoacidosis, resolved  2. Diabetes mellitus type 1  3. Chronic kidney disease on peritoneal dialysis  4. Hypertension  5. Serratia marcescens pneumonia  6. Urinary tract infection  7. Left leg infection/blister on left status post debridement and wound drainage  8. Depression  9. Initial PEA cardiac arrest, resolved    PLAN:  1. Continue IV antibiotics including vancomycin and meropenem  2. Discontinue IV fluids  3. Transfer to floor  4. Take off insulin drip and begin Lantus/Humalog coverage  5. Continue antihypertensive medication  6.  Peritoneal dialysis per nephrology management    ATTESTATION:  ICU Staff Physician note of personal involvement in Care  As the attending physician, I certify that I personally reviewed the patients history and personally examined the patient to confirm the physical findings described above, And that I reviewed the relevant imaging studies and available reports. I also discussed the differential diagnosis and all of the proposed management plans with the patient and individuals accompanying the patient to this visit. They had the opportunity to ask questions about the proposed management plans and to have those questions answered. This patient has a high probability of sudden, clinically significant deterioration, which requires the highest level of physician preparedness to intervene urgently. I managed/supervised life or organ supporting interventions that required frequent physician assessment. I devoted my full attention to the direct care of this patient for the amount of time indicated below. Time I spent with the family or surrogate(s) is included only if the patient was incapable of providing the necessary information or participating in medical decisions  Time devoted to teaching and to any procedures I billed separately is not included.     CRITICAL CARE TIME:  34 minutes    Electronically signed by Dejah Hollis MD on 2/24/2021 at 2:06 PM

## 2021-02-24 NOTE — PROGRESS NOTES
Attempted tx with pt, however pt returning to bed on arrival after working with PTA. Pt is having a lot of pain in chest and is labile d/t pain. Nsg aware. Will attempt tx with pt at later time/date.  Hung Stover, [address]

## 2021-02-24 NOTE — CONSULTS
[address] [facility]  Consult Note    [address] [facility] [address]  [place], [address]  Phone [phone]   [phone]      Admit Date: 2/15/2021  8:07 AM  Pt Name: Latoya Mckinley  MRN: [id number]  : 1990  Reason for Consult:    Chief Complaint   Patient presents with    Cardiac Arrest     to er via ems from home. the call was for unresponsiveness. ems state the pt arrested as they arrived here. initial rhythm was PEA     Requesting Physician:  Jacklyn Huynh DO  PCP: Kyeur Oh MD  History Obtained From:  patient, chart   ID consulted for Cardiac arrest [facility]) [I46.9]  on hospital day        Chief Complaint   Patient presents with    Cardiac Arrest     to er via ems from home. the call was for unresponsiveness. ems state the pt arrested as they arrived here. initial rhythm was PEA     HISTORYOF PRESENT ILLNESS      Wilton Rehman is a 31 y.o. female who presents with significant past medical history of  has a past medical history of Acute congestive heart failure ([address] BC (acute kidney injury) ([address] Cephalgia, Chronic kidney disease, Depression, Diabetes mellitus ([address] Diabetic ketoacidosis ([address] Hemodialysis patient ([address] History of blood transfusion, Hyperlipidemia, Hypothyroidism, Iron deficiency anemia, MDRO (multiple drug resistant organisms) resistance, MRSA (methicillin resistant Staphylococcus aureus), Non compliance w medication regimen, Other disorders of kidney and ureter, Pregnancy, Seizure ([address] Severe pre-eclampsia in third trimester, and Shock liver.    ED TRIAGEVITALS  BP: (!) 151/92, Temp: 97.6 °F (36.4 °C), Pulse: 112, Resp: 26, SpO2: 93 %  HPI  Pt with h/o CKD/PD/HTN/DM     ER left le pain -neg dvt afebrile   ER with ypoglycemia  Admitted on 2/15 from ER with after cardiac arrest   intubated  Wbc8.1 hgb8.2 plt 339 cr9.7 [id number] lactic acid 10.3  UA wbc/bacteria  covid neg   Blood cx ngtd resp cx Serratia/Candida  podiatry was consulted for Left le developed blisters  Extubated 2//16 2/22 leg cx  Pending   2/23 POSTOPERATIVE DIAGNOSES:  1. Cellulitis, left leg. 2.  Necrotizing infection, left leg. 3.  Ulceration, left leg with necrosis of the muscle. 4.  Insulin-dependent diabetes mellitus with neuropathy, uncontrolled.     PROCEDURES PERFORMED:  1. Incision and drainage of left leg abscess. 2.  Excisional debridement of left leg wound to and through level of  deep fascia and muscle, total measurement is 7 cm x 4.5 cm x 0.5 cm in  dimension. 3.  Application of wound VAC negative pressure therapy set at 125 mmHg  Continuous. ID was consulted for aTBX MX      meropenem (MERREM) 500 mg IVPB (mini-bag), Q24H 2/18    vancomycin (VANCOCIN) intermittent dosing (placeholder), RX Placeholder 2/15    Pt in bed c/o chest pain   Pt is s/p cardiac arrest    REVIEW OF SYSTEMS    (2-9 systems for level 4, 10 or more for level 5)     REVIEW OFSYSTEMS:    CONSTITUTIONAL:   No fever, chills, weight loss  ALLERGIES:    No urticaria, hay fever,    EYES:     No blurry vision, loss of vision,eye pain  ENT:      No hearing loss, sore throat  CARDIOVASCULAR:  No chest pain or palpitations  RESPIRATORY:   No cough, sob  ENDOCRINE:    No increase thirst, urination   HEME-LYMPH:   No easy bruising or bleeding  GI:     No nausea, vomiting or diarrhea  :     No urinary complaints  NEURO:    No seizures, stroke, HA  MUSCULOSKELETAL:  No muscle aches or pain, no jointpain  SKIN:     No rash or itch  PSYCH:    No depression or anxiety    Medications Prior to Admission: gabapentin (NEURONTIN) 100 MG capsule, Take 200 mg by mouth 2 times daily.   Insulin Pump - insulin aspart (patient supplied), Inject into the skin continuous Insulin-to-Carb Ratio (ICR): ** Insulin Sensitivity Factor (ISF): ** mg/dL per unit of insulin Target Blood Glucose: ** mg/dL Bolus Frequency: ** rOPINIRole (REQUIP) 0.25 MG tablet, Take 0.25 mg by mouth nightly  losartan (COZAAR) 50 MG tablet, Take 50 mg by mouth daily  metoprolol tartrate (LOPRESSOR) 25 MG tablet, Take 25 mg by mouth 2 times daily  bumetanide (BUMEX) 2 MG tablet, Take 1 tablet by mouth 2 times daily New higher frequency  Calcium Acetate, Phos Binder, 667 MG CAPS, Take 1 capsule by mouth 3 times daily (with meals)  dilTIAZem (CARDIZEM CD) 240 MG extended release capsule, Take 1 capsule by mouth daily  mirtazapine (REMERON) 15 MG tablet, Take 1 tablet by mouth nightly  metoclopramide (REGLAN) 5 MG tablet, Take 5 mg by mouth 3 times daily   levothyroxine (SYNTHROID) 75 MCG tablet, Take 1 tablet by mouth every morning (before breakfast)  metOLazone (ZAROXOLYN) 5 MG tablet, Take 5 mg by mouth daily  atorvastatin (LIPITOR) 40 MG tablet, Take 1 tablet by mouth nightly  blood glucose test strips (CONTOUR NEXT TEST) strip, 1 each by In Vitro route 5 times daily As needed.   cloNIDine (CATAPRES) 0.1 MG tablet, Take 0.1 mg by mouth 2 times daily as needed for High Blood Pressure'  CURRENT MEDICATIONS     Current Facility-Administered Medications:     insulin glargine (LANTUS) injection vial 3 Units, 3 Units, Subcutaneous, BID, Truong Ryan MD, 3 Units at 02/24/21 1014    insulin lispro (HUMALOG) injection vial 0-6 Units, 0-6 Units, Subcutaneous, TID WC, Truong Ryan MD, 1 Units at 02/24/21 1202    insulin lispro (HUMALOG) injection vial 0-3 Units, 0-3 Units, Subcutaneous, Nightly, Truong Ryan MD    lidocaine 4 % external patch 1 patch, 1 patch, Transdermal, Daily, Sara Abel MD, 1 patch at 02/24/21 1041    dextrose 50 % IV solution, 12.5 g, Intravenous, PRN, Michelle Yee, DPM, 12.5 g at 02/23/21 1408    0.9 % sodium chloride bolus, 15 mL/kg, Intravenous, Once **FOLLOWED BY** 0.9 % sodium chloride infusion, , Intravenous, Continuous, Michelle Locus, DPM   fentaNYL (SUBLIMAZE) injection 25 mcg, 25 mcg, Intravenous, Q2H PRN, Avelina Ventura MD, 25 mcg at 02/24/21 0937    oxyCODONE-acetaminophen (PERCOCET) 5-325 MG per tablet 1 tablet, 1 tablet, Oral, Q4H PRN, 1 tablet at 02/24/21 1200 **OR** oxyCODONE-acetaminophen (PERCOCET) 5-325 MG per tablet 2 tablet, 2 tablet, Oral, Q4H PRN, Lexi Ogles, DPM, 2 tablet at 02/22/21 1632    metoprolol tartrate (LOPRESSOR) tablet 25 mg, 25 mg, Oral, BID, Lexi Ogles, DPM, 25 mg at 02/24/21 0908    Vitamin D (CHOLECALCIFEROL) tablet 1,000 Units, 1,000 Units, Oral, Daily, Lexi Ogles, DPM, 1,000 Units at 02/24/21 0908    perflutren lipid microspheres (DEFINITY) injection 1.65 mg, 1.5 mL, Intravenous, ONCE PRN, Lexi Ogles, DPM    vitamin D (ERGOCALCIFEROL) capsule 50,000 Units, 50,000 Units, Oral, Weekly, Lexi Ogles, DPM, 50,000 Units at 02/21/21 1640    pantoprazole (PROTONIX) tablet 40 mg, 40 mg, Oral, QAM AC, Lexi Ogles, DPM, 40 mg at 02/24/21 0509    0.9 % sodium chloride infusion, , Intravenous, PRN, Lexi Ogles, DPM    Calcium Acetate (Phos Binder) CAPS 1,334 mg, 1,334 mg, Oral, TID WC, Lexi Ogles, DPM, 1,334 mg at 02/24/21 1200    meropenem (MERREM) 500 mg IVPB (mini-bag), 500 mg, Intravenous, Q24H, Lexi Ogles, DPM, Stopped at 02/24/21 1154    [DISCONTINUED] pantoprazole (PROTONIX) injection 40 mg, 40 mg, Intravenous, Daily, 40 mg at 02/19/21 0840 **AND** sodium chloride (PF) 0.9 % injection 10 mL, 10 mL, Intravenous, Daily, Lexi Ogles, DPM, 10 mL at 02/24/21 0907    ipratropium-albuterol (DUONEB) nebulizer solution 1 ampule, 1 ampule, Inhalation, Q6H, Lexi Garcia DPM, 1 ampule at 02/24/21 1310   epoetin nena-epbx (RETACRIT) injection 3,000 Units, 3,000 Units, Subcutaneous, Once per day on Mon Wed Fri, 3,000 Units at 02/24/21 0908 **AND** epoetin nena-epbx (RETACRIT) injection 2,000 Units, 2,000 Units, Subcutaneous, Once per day on Mon Wed Fri, Krysten Nela, DPM, 2,000 Units at 02/24/21 0908    glucose (GLUTOSE) 40 % oral gel 15 g, 15 g, Oral, PRN, Krysten Nela, DPM    dextrose 50 % IV solution, 12.5 g, Intravenous, PRN, Krysten Nela, DPM, 12.5 g at 02/19/21 1735    glucagon (rDNA) injection 1 mg, 1 mg, Intramuscular, PRN, Krysten Nela, DPM    dextrose 5 % solution, 100 mL/hr, Intravenous, PRN, Krysten Nela, DPM, Stopped at 02/16/21 1400    levothyroxine (SYNTHROID) tablet 75 mcg, 75 mcg, Per NG tube, QAM AC, Krysten Nela, DPM, 75 mcg at 02/24/21 0509    sodium chloride flush 0.9 % injection 10 mL, 10 mL, Intravenous, 2 times per day, Krysten Nela, DPM, 10 mL at 02/24/21 0907    sodium chloride flush 0.9 % injection 10 mL, 10 mL, Intravenous, PRN, Krysten Nela, DPM    polyethylene glycol (GLYCOLAX) packet 17 g, 17 g, Oral, Daily PRN, Krysten Nela, DPM    acetaminophen (TYLENOL) tablet 650 mg, 650 mg, Oral, Q6H PRN, 650 mg at 02/20/21 0327 **OR** acetaminophen (TYLENOL) suppository 650 mg, 650 mg, Rectal, Q6H PRN, Krysten Nela, DPM    heparin (porcine) injection 5,000 Units, 5,000 Units, Subcutaneous, 3 times per day, Krysten Nela, DPM, 5,000 Units at 02/24/21 0530    vancomycin (VANCOCIN) intermittent dosing (placeholder), , Other, RX Placeholder, Krysten Nela DPM  ALLERGIES     Cefepime and Toradol [ketorolac tromethamine]  Immunization History   Administered Date(s) Administered    Influenza Virus Vaccine 10/22/2011, 10/23/2012    Influenza, Quadv, 6 mo and older, IM, PF (Flulaval, Fluarix) 12/15/2018    Influenza, Quadv, IM, PF (6 mo and older Fluzone, Flulaval, Fluarix, and 3 yrs and older Afluria) 03/16/2017, 09/29/2017  Tdap (Boostrix, Adacel) 2016, 2016, 2017     PAST MEDICAL HISTORY     Past Medical History:   Diagnosis Date    Acute congestive heart failure ([facility]     BC (acute kidney injury) ([facility] 10/1/2019    Cephalgia 10/9/2019    Chronic kidney disease     Depression     Diabetes mellitus ([facility]     Diabetic ketoacidosis ([facility] 2011    Hemodialysis patient ([facility]     History of blood transfusion 2019    Hyperlipidemia 10/8/2020    Hypothyroidism 10/8/2020    Iron deficiency anemia 10/1/2019    MDRO (multiple drug resistant organisms) resistance     MRSA (methicillin resistant Staphylococcus aureus)     back wound abcess    Non compliance w medication regimen 3/30/2016    Other disorders of kidney and ureter     Pregnancy 3/30/2016    16 weeks    Seizure ([facility] 2020    Severe pre-eclampsia in third trimester 2016    Shock liver 2/15/2021     SURGICAL HISTORY       Past Surgical History:   Procedure Laterality Date    BACK SURGERY      abscess     SECTION      x2    CHOLECYSTECTOMY, LAPAROSCOPIC N/A 2019    CHOLECYSTECTOMY LAPAROSCOPIC performed by Ellie Schwab MD at [place] [address] 2018    COLONOSCOPY WITH BIOPSY performed by Baltazar Yadav MD at [place] [address] N/A 2018    COLONOSCOPY WITH BIOPSY performed by Dino Purcell MD at [address] [place] [address] ECHO COMPL [address] [facility]  2012    EF 57%    ECHO COMPL W DOP COLOR FLOW  6/10/2013         EMBOLECTOMY N/A 2020    ANGIOVAC, VEGECTOMY, YULISSA -- REQS ROOM 3 performed by Pal Sotelo MD at [facility] Left 2021    LEFT LEG INCISION AND DRAINAGE, DEBRIDEMENT, WOUND VAC APPLICATION performed by Concepcion Rosa DPM at [place] [address] N/A 2020    LAPAROSCOPIC INSERTION PERITONEAL DIALYSIS CATHETER performed by Claudeen Grip, MD at [address] [place] [address]  LA ESOPHAGOGASTRODUODENOSCOPY TRANSORAL DIAGNOSTIC N/A 5/30/2018    EGD ESOPHAGOGASTRODUODENOSCOPY performed by Abimael Ariza MD at [address] [facility] TRANSESOPHAGEAL ECHOCARDIOGRAM N/A 10/19/2020    TRANSESOPHAGEAL ECHOCARDIOGRAM WITH BUBBLE STUDY performed by Dalton Lucia MD at [address] [facility] TUNNELED VENOUS PORT PLACEMENT  04/2018    UPPER GASTROINTESTINAL ENDOSCOPY  12/18/2018    EGD BIOPSY performed by Eleonora Concepcion MD at [address] [employer] N/A 10/11/2019    EGD ESOPHAGOGASTRODUODENOSCOPY performed by Felisha Thomas DO at [address]       Family History   Problem Relation Age of Onset    Asthma Mother     Hypertension Mother     High Blood Pressure Mother     Diabetes Mother     Asthma Brother     High Blood Pressure Father      SOCIAL HISTORY       Social History     Socioeconomic History    Marital status: Single     Spouse name: None    Number of children: 2    Years of education: None    Highest education level: None   Occupational History    None   Social Needs    Financial resource strain: Very hard    Food insecurity     Worry: Never true     Inability: Never true    Transportation needs     Medical: No     Non-medical: No   Tobacco Use    Smoking status: Current Every Day Smoker     Packs/day: 0.50     Years: 6.00     Pack years: 3.00     Types: Cigarettes    Smokeless tobacco: Current User   Substance and Sexual Activity    Alcohol use: No    Drug use: No     Comment: documented prior history of opioid abuse    Sexual activity: Yes     Partners: Male   Lifestyle    Physical activity     Days per week: None     Minutes per session: None    Stress: None   Relationships    Social connections     Talks on phone: None     Gets together: None     Attends [employer] service: None     Active member of club or organization: None     Attends meetings of clubs or organizations: None     Relationship status: None  Intimate partner violence     Fear of current or ex partner: None     Emotionally abused: None     Physically abused: None     Forced sexual activity: None   Other Topics Concern    None   Social History Narrative    None     ·      PHYSICAL EXAM    (up to 7 for level 4, 8 or more forlevel 5)     ED Triage Vitals   BP Temp Temp Source Pulse Resp SpO2 Height Weight   02/15/21 0816 02/15/21 0853 02/15/21 0853 02/15/21 0816 02/15/21 0816 02/15/21 0853 02/15/21 1420 02/15/21 0851   (!) 229/117 96.3 °F (35.7 °C) CORE 144 20 100 % 5' 4\" (1.626 m) 173 lb 8 oz (78.7 kg)     Vitals:    Vitals:    02/24/21 1000 02/24/21 1100 02/24/21 1200 02/24/21 1300   BP: (!) 130/95 122/78 (!) 139/91 (!) 151/92   Pulse: 105 102 101 112   Resp: 20 28 28 26   Temp:   97.6 °F (36.4 °C)    TempSrc:   Oral    SpO2: 95% 90% 93% 93%   Weight:       Height:         Physical Exam   Constitutional/General: Alert and oriented, NAD  Head: NC/AT  Eyes: PERRL, EOMI  Mouth: Normal mucosa, no thrush   Neck: Supple, full ROM,    Pulmonary: Lungs dec to auscultation bilaterally. Not in respiratory distress  Cardiovascular:  Regular rate and rhythm, no murmurs, gallops, or rubs. Abdomen: Soft, + BS.    distension. Nontender. pd cath  Extremities: Moves all extremities x 4.    Warm and well perfused  Pulses:  Distal pulses intact dec   Skin: Warm and dry without rash  Neurologic:    No focal deficits  Psych: Normal Affect     DIAGNOSTICRESULTS   RADIOLOGY:   Echo Complete    Result Date: 2/22/2021 Transthoracic Echocardiography Report (TTE)  Demographics   Patient Name   [facility]     Gender            Female                 Steven Cornelius   Medical Record [id number]       Room Number       [address]  Number   Account #      [de-identified]      Procedure Date    02/22/2021   Corporate ID                  Ordering          Ammon Guardado                                Physician   Accession      [id number]     Referring  Number                        Physician   Date of Birth  1990     Sonographer       Bernabebautista العراقيin [facility]   Age            31 year(s)     Interpreting      Mir 66                                Physician         Physician Cardiology                                                  Dalton Lucia MD                                 Any Other  Procedure Type of Study   TTE procedure:Echo Complete W/Doppler & Color Flow. Procedure Date Date: 02/22/2021 Start: 10:33 AM Study Location: Portable Technical Quality: Adequate visualization Indications:S/P Cardiac Arrest. Patient Status: Routine Height: 64 inches Weight: 160 pounds BSA: 1.78 m^2 BMI: 27.46 kg/m^2 BP: 126/77 mmHg  Findings   Left Ventricle  Normal left ventricular chamber size. Normal left ventricular systolic function, LVEF is 68%. Moderate left ventricular concentric hypertrophy noted. Normal diastolic function. Right Ventricle  Normal right ventricle size and function. Left Atrium  Left atrium is of normal size. Interatrial septum not well visualized but appears intact. Right Atrium  Normal right atrium. Mitral Valve  Normal mitral valve structure. There is trace mitral regurgitation. No mitral valve prolapse. Tricuspid Valve  Normal tricuspid valve structure. There is mild to moderate tricuspid regurgitation. Moderate pulmonary hypertension, RVSP 55mmHg. Aortic Valve  Normal aortic valve structure and function. No hemodynamically significant aortic stenosis.    Pulmonic Valve  The pulmonic valve was not well visualized. Pericardial Effusion  No evidence of pericardial effusion. Pericardium appears normal.   Pleural Effusion  No evidence of pleural effusion. Aorta  Normal aortic root size and ascending aorta. Miscellaneous  The inferior vena cava diameter is normal with normal respiratory  variation. Conclusions   Summary  Normal left ventricular chamber size. Normal left ventricular systolic function, LVEF is 68%. Moderate left ventricular concentric hypertrophy noted. Normal diastolic function. Left atrium is of normal size. Interatrial septum not well visualized but appears intact. Normal right ventricle size and function. There is trace mitral regurgitation. No mitral valve prolapse. No hemodynamically significant aortic stenosis. Normal aortic root size. No evidence of pericardial effusion. No intra cardiac mass or thrombus. Compared to prior echo from Jan 2020 - Pulmonary Hypertension is new.    Signature   ----------------------------------------------------------------  Electronically signed by Komal De La O MD(Interpreting  physician) on 02/22/2021 04:16 PM  ----------------------------------------------------------------  M-Mode/2D Measurements & Calculations   LV Diastolic    LV Systolic Dimension: 2.8   AV Cusp Separation: 1.9 cmLA  Dimension: 4.5  cm                           Dimension: 3.1 cmAO Root  cm              LV Volume Diastolic: 91 ml   Dimension: 3 cm  LV FS:37.8 %    LV Volume Systolic: 79.8 ml  LV PW           LV EDV/LV EDV Index: 91  Diastolic: 1.4  HA/88 QK/G^2YK ESV/LV ESV  cm              Index: 29.5 ml/17ml/ m^2     RV Diastolic Dimension: 2.8  LV PW Systolic: EF Calculated: 10.2 %        cm  2 cm            LV Mass Index: 147 l/min*m^2 RV Systolic Dimension: 2.2 cm  Septum                                       LA/Aorta: 1.2  Diastolic: 1.5  cm              LVOT: 2 cm                   LA volume/Index: 35.7 ml  Septum  Systolic: 1.7  cm   LV Mass: 261.93  g  Doppler Measurements & Calculations   MV Peak E-Wave:   AV Peak Velocity: 1.63 m/s    LVOT Peak Velocity: 1.35  0.96 m/s          AV Peak Gradient: 10.61 mmHg  m/s  MV Peak A-Wave:   AV Mean Velocity: 1.11 m/s    LVOT Mean Velocity: 0.97  0.89 m/s          AV Mean Gradient: 5.5 mmHg    m/s  MV E/A Ratio:     AV VTI: 24.9 cm               LVOT Peak Gradient: 7.3  1.09              AV Area (Continuity):2.66     mmHgLVOT Mean Gradient:  MV Peak Gradient: cm^2                          4.1 mmHg  4.2 mmHg                                        Estimated RVSP: 55.2 mmHg  MV Mean Gradient: LVOT VTI: 21.1 cm             Estimated RAP:10 mmHg  2.5 mmHg  MV Mean Velocity: Estimated PASP: 55.24 mmHg  0.75 m/s          Pulm. Vein A Reversal         TR Velocity:3.36 m/s  MV Deceleration   Duration:120 msec             TR Gradient:45.24 mmHg  Time: 167.1 msec  Pulm. Vein D Velocity:0.44    PV Peak Velocity: 1.22 m/s  MV P1/2t: 29.2    m/sPulm. Vein A Reversal      PV Peak Gradient: 5.9 mmHg  msec              Velocity:0.31 m/s             PV Mean Velocity: 0.9 m/s  MVA by PHT:7.53   Pulm. Vein S Velocity: 0.56   PV Mean Gradient: 3.5 mmHg  cm^2              m/s  MV Area  (continuity): 3.6  cm^2  [id number]    Xr Tibia Fibula Left (2 Views)    Result Date: 2/22/2021 EXAMINATION: ONE XRAY VIEW OF THE CHEST 2/19/2021 6:33 am COMPARISON: 02/18/2021 HISTORY: ORDERING SYSTEM PROVIDED HISTORY: resp failure TECHNOLOGIST PROVIDED HISTORY: Reason for exam:->resp failure FINDINGS: The heart is enlarged. There is persistent bilateral hazy infiltration of the right and left lungs unchanged compared to prior study. There is a trace right pleural effusion. 1. No interval change in the multifocal bilateral diffuse hazy pulmonary infiltrates. Xr Chest Portable    Result Date: 2/18/2021  EXAMINATION: ONE XRAY VIEW OF THE CHEST 2/18/2021 10:27 pm COMPARISON: Chest series from the same day at 10:12 HISTORY: [address] [facility]: hypoxia TECHNOLOGIST PROVIDED HISTORY: Reason for exam:->hypoxia FINDINGS: Symmetric low lung volumes. Persistent but perhaps slightly improved bilateral diffuse ill-defined opacities left slightly greater than right. No pleural effusion or pneumothorax definitely seen. Cardiac silhouette is enlarged. Difficult to assess pulmonary vascularity. Osseous and thoracic soft tissue structures demonstrate no acute findings. Persistent but perhaps slightly improved bilateral diffuse ill-defined opacities left slightly greater than right. RECOMMENDATION: Continued radiographic follow-up suggested. Xr Chest Portable    Result Date: 2/18/2021  EXAMINATION: ONE XRAY VIEW OF THE CHEST 2/18/2021 9:56 am COMPARISON: Chest radiograph February 17, 2021 HISTORY: ORDERING SYSTEM PROVIDED HISTORY: resp filure TECHNOLOGIST PROVIDED HISTORY: Reason for exam:->resp filure FINDINGS: Trachea is midline. Cardiomediastinal silhouette is stable in size. Interval improvement in right-sided airspace opacity with worsening of left-sided airspace opacities. No pneumothorax. No sizable pleural effusions. Improvement in right-sided opacities and worsening in left-sided airspace opacities. Finding may represent pulmonary edema versus atelectasis.     Xr Chest Portable Result Date: 2/17/2021  EXAMINATION: ONE XRAY VIEW OF THE CHEST 2/17/2021 12:39 pm COMPARISON: Comparison is dated February 15, 2021 HISTORY: ORDERING SYSTEM PROVIDED HISTORY: sob TECHNOLOGIST PROVIDED HISTORY: Reason for exam:->sob FINDINGS: ET tube is been removed There are extensive pulmonary primarily right-sided infiltrates with marked airspace consolidation. There left parahilar infiltrates. Cardiac silhouette is partially obscured. No pleural effusion seen. Extensive primarily right-sided pulmonary infiltrates new since the prior exam    Xr Chest Portable    Result Date: 2/15/2021  EXAMINATION: ONE SUPINE XRAY VIEW(S) OF THE ABDOMEN; ONE XRAY VIEW OF THE CHEST 2/15/2021 8:29 am COMPARISON: None. HISTORY: ORDERING SYSTEM PROVIDED HISTORY: Confirmation of course of NG/OG/NE tube and location of tip of tube TECHNOLOGIST PROVIDED HISTORY: Reason for exam:->Confirmation of course of NG/OG/NE tube and location of tip of tube Portable? ->Yes Abdomen film dated 11/05/2019 and portable chest dated 12/26/2020 FINDINGS: ABDOMEN: There is an NG tube with the tip in the distal stomach. There is mild distention of predominantly small bowel that could be due to ileus or bowel obstruction. Increased density is seen in the lateral aspect of the abdomen bilaterally suggesting the possibility of ascites. Surgical clips are seen in the right upper quadrant. No suspicious intra-abdominal calcifications are identified. The pelvis is excluded from the images. PORTABLE CHEST: There is an ET tube with the tip about 2 cm above the amanda. No acute infiltrate or pleural effusion is seen. The heart and mediastinum are not significantly changed and there is no evidence of vascular congestion. 1. ET tube in satisfactory position. No evidence of acute intrathoracic disease. 2. NG tube tip in the distal stomach. 3. Nonspecific small bowel distension that could be related to ileus or small-bowel obstruction. 4. Suspected ascites. Us Dup Lower Extremity Left Khurram    Result Date: 2/20/2021  EXAMINATION: DUPLEX VENOUS ULTRASOUND OF THE LEFT LOWER EXTREMITY 2/20/2021 9:31 am TECHNIQUE: Duplex ultrasound and Doppler images were obtained of the left lower extremity. COMPARISON: None. HISTORY: ORDERING SYSTEM PROVIDED HISTORY: EDEMA TECHNOLOGIST PROVIDED HISTORY: Reason for exam:->EDEMA What reading provider will be dictating this exam?->CRC FINDINGS: The visualized veins of the left lower extremity are patent and free of echogenic thrombus. The veins are normally compressible and have normal phasic flow. No evidence of DVT in the left lower extremity.     Xr Abdomen For Ng/og/ne Tube Placement    Result Date: 2/15/2021 EXAMINATION: ONE SUPINE XRAY VIEW(S) OF THE ABDOMEN; ONE XRAY VIEW OF THE CHEST 2/15/2021 8:29 am COMPARISON: None. HISTORY: ORDERING SYSTEM PROVIDED HISTORY: Confirmation of course of NG/OG/NE tube and location of tip of tube TECHNOLOGIST PROVIDED HISTORY: Reason for exam:->Confirmation of course of NG/OG/NE tube and location of tip of tube Portable? ->Yes Abdomen film dated 11/05/2019 and portable chest dated 12/26/2020 FINDINGS: ABDOMEN: There is an NG tube with the tip in the distal stomach. There is mild distention of predominantly small bowel that could be due to ileus or bowel obstruction. Increased density is seen in the lateral aspect of the abdomen bilaterally suggesting the possibility of ascites. Surgical clips are seen in the right upper quadrant. No suspicious intra-abdominal calcifications are identified. The pelvis is excluded from the images. PORTABLE CHEST: There is an ET tube with the tip about 2 cm above the amanda. No acute infiltrate or pleural effusion is seen. The heart and mediastinum are not significantly changed and there is no evidence of vascular congestion. 1. ET tube in satisfactory position. No evidence of acute intrathoracic disease. 2. NG tube tip in the distal stomach. 3. Nonspecific small bowel distension that could be related to ileus or small-bowel obstruction. 4. Suspected ascites.     LABS  Recent Labs     02/22/21  0513 02/23/21  0520 02/24/21  0630   WBC 8.6 8.0 8.6   HGB 8.0* 8.0* 7.7*   HCT 24.6* 25.4* 24.4*   MCV 89.8 93.4 92.4    366 420     Recent Labs     02/22/21  0513 02/23/21  0520 02/23/21  0520 02/23/21  2150 02/24/21  0230 02/24/21  0630    139   < > 137 134 135   K 3.7 3.8   < > 3.7 4.2 4.7   CL 98 96*   < > 100 100 99   CO2 24 26   < > 22 22 22   BUN 50* 46*   < > 47* 43* 41*   CREATININE 8.5* 8.6*   < > 8.6* 8.0* 8.0*   GFRAA 7 7   < > 7 7 7   LABGLOM 7 7   < > 7 7 7   GLUCOSE 173* 270*   < > 138* 114* 125* PROT 5.9* 5.8*  --   --   --  5.4*   LABALBU 2.6* 2.7*  --   --   --  2.3*   CALCIUM 8.8 9.1   < > 8.4* 8.2* 8.4*   BILITOT 0.3 <0.2  --   --   --  <0.2   ALKPHOS 241* 259*  --   --   --  208*   AST 17 14  --   --   --  18   ALT 38* 26  --   --   --  18    < > = values in this interval not displayed.      Recent Labs     02/22/21  1428   PROCAL 6.14*     Lab Results   Component Value Date    CRP 12.8 (H) 02/22/2021    CRP 2.5 (H) 10/31/2020    CRP 43.1 (H) 11/01/2019     Lab Results   Component Value Date    SEDRATE 95 (H) 02/22/2021    SEDRATE 35 (H) 10/31/2020    SEDRATE 19 10/13/2020     No results found for: [id number]  Lab Results   Component Value Date    COVID19 Not Detected 02/15/2021    COVID19 Not Detected 11/25/2020     COVID-19/ISAIAH-COV2 LABS  Recent Labs     02/22/21  0513 02/22/21  1428 02/23/21  0520 02/24/21  0630   CRP  --  12.8*  --   --    PROCAL  --  6.14*  --   --    AST 17  --  14 18   ALT 38*  --  26 18     Lab Results   Component Value Date    CHOL 95 01/14/2020    TRIG 82 01/14/2020    HDL 33 01/14/2020    LDLCALC 46 01/14/2020    LABVLDL 16 01/14/2020         Lab Results   Component Value Date    HEPAIGM Non-Reactive 02/13/2020    HEPBIGM Non-Reactive 11/01/2020    HCVABI Non-Reactive 02/13/2020     Hep C Ab Interp   Date Value Ref Range Status   02/13/2020 Non-Reactive NON REACT Final        MICROBIOLOGY:     Cultures :   Lab Results   Component Value Date    BC 5 Days no growth 02/18/2021    BC 5 Days no growth 02/15/2021    BC 5 Days no growth 12/26/2020    ORG Serratia marcescens 02/18/2021    ORG Nadine albicans 02/18/2021    ORG Staphylococcus epidermidis 10/08/2020     Lab Results   Component Value Date    BLOODCULT2 5 Days no growth 02/18/2021    BLOODCULT2 5 Days no growth 02/15/2021    BLOODCULT2 5 Days no growth 12/26/2020    ORG Serratia marcescens 02/18/2021    ORG Nadine albicans 02/18/2021    ORG Staphylococcus epidermidis 10/08/2020       WOUND/ABSCESS Date Value Ref Range Status   02/22/2021 Growth not present, incubation continues  Preliminary   12/11/2014 (A)  Final    Mixed yulisa also isolated, including:  Alpha hemolytic Strep species  Corynebacteria     12/11/2014 Heavy growth  Final       Smear, Respiratory   Date Value Ref Range Status   02/18/2021   Final    Group 6: <25 PMN's/LPF and <25 Epithelial cells/LPF  Rare Polymorphonuclear leukocytes  Rare Epithelial cells  Few Gram positive diplococci  Rare Gram negative rods           Component Value Date/Time    FUNGSM No Fungal elements seen 11/06/2020 1142    LABFUNG No growth after 4 weeks of incubation. 11/06/2020 1142     CULTURE, RESPIRATORY   Date Value Ref Range Status   02/18/2021 Oral Pharyngeal Yulisa reduced (A)  Final   02/18/2021 Rare growth  Final   02/18/2021 Moderate growth  Final     No results found for: CXCATHTIP  Body Fluid Culture, Sterile   Date Value Ref Range Status   10/20/2020   Final    Neisseria gonorrhoeae not isolated  Growth not present       Culture Surgical   Date Value Ref Range Status   02/23/2021 Growth not present, incubation continues  Preliminary   11/06/2020 Growth not present  Final     Urine Culture, Routine   Date Value Ref Range Status   11/24/2020 Growth not present  Final   10/08/2020 50,000 CFU/ml  Final   08/26/2020 <10,000 CFU/mL  Mixed gram positive organisms    Final     MRSA Culture Only   Date Value Ref Range Status   12/27/2020 Methicillin resistant Staph aureus not isolated  Final   10/13/2020 Methicillin resistant Staph aureus not isolated  Final   10/30/2019 Methicillin resistant Staph aureus not isolated  Final        Patient is a 31 y.o. female who presented with   Chief Complaint   Patient presents with    Cardiac Arrest     to er via ems from home. the call was for unresponsiveness. ems state the pt arrested as they arrived here. initial rhythm was PEA        FINAL IMPRESSION         1. Cellulitis, left leg. 2.  Necrotizing infection, left leg. 3.  Ulceration, left leg with necrosis of the muscle.       PROCEDURES PERFORMED:  1. Incision and drainage of left leg abscess. 2.  Excisional debridement of left leg wound to and through level of  deep fascia and muscle, total measurement is 7 cm x 4.5 cm x 0.5 cm in  dimension. 3.  Application of wound VAC negative pressure therapy set at 125 mmHg  continuous. S/p cardiac arrest has chest pain   CKD /on PD           meropenem (MERREM) 500 mg IVPB (mini-bag), Q24H       vancomycin (VANCOCIN) intermittent dosing (placeholder), RX Placeholder    Await final cx  Wound care Per [facility]ala[facility] with DR Morris      Imaging and labs were reviewed per medical records and any ID pertinent labs were addressed with the patient. The patient/FAMILY  was educated about the diagnosis, prognosis, indications, risks and benefits of treatment. An opportunity to ask questions was given to the patient/FAMILY and questions were answered. Thank you for involving me in the care of [address] [place] [address]. Please do not hesitate to call for any questions or concerns.          Electronically signed by Xi Booth MD on 2/24/2021 at 1:49 PM

## 2021-02-24 NOTE — OP NOTE
[address]                                OPERATIVE REPORT    PATIENT NAME: Radha Keller                 :        1990  MED REC NO:   [id number]                            ROOM:       10  ACCOUNT NO:   [de-identified]                           ADMIT DATE: 02/15/2021  PROVIDER:     Soledad Jones DPM    DATE OF PROCEDURE:  2021    SURGEON:  Soledad Jones DPM    ASSISTANT:  Agustin Villarreal, PGY-2    PREOPERATIVE DIAGNOSES:  1. Cellulitis, left leg. 2.  Necrotizing infection, left leg. 3.  Ulceration, left leg with necrosis of the muscle. 4.  Insulin-dependent diabetes mellitus with neuropathy, uncontrolled. POSTOPERATIVE DIAGNOSES:  1. Cellulitis, left leg. 2.  Necrotizing infection, left leg. 3.  Ulceration, left leg with necrosis of the muscle. 4.  Insulin-dependent diabetes mellitus with neuropathy, uncontrolled. PROCEDURES PERFORMED:  1. Incision and drainage of left leg abscess. 2.  Excisional debridement of left leg wound to and through level of  deep fascia and muscle, total measurement is 7 cm x 4.5 cm x 0.5 cm in  dimension. 3.  Application of wound VAC negative pressure therapy set at 125 mmHg  continuous. ANESTHESIA:  MAC with local anesthesia infiltration comprising 20 mL of  0.5% Marcaine plain. ESTIMATED BLOOD LOSS:  Minimal.    COMPLICATIONS:  None. SPECIMENS OBTAINED:  Left leg wound culture and left leg tissue culture  as well as left leg abscess. INTRAOPERATIVE FINDINGS:  Necrosis of soft tissue consistent with  necrotizing infection. Abscess formation noted, which was cultured. No  further evidence of deep abscess formation post debridement. INDICATIONS:  This is a pleasant 82-year-old female who presents today  for left leg debridement.   The patient has had a necrotizing type of  infection resulting in bullae formation to the left leg and had worsening cellulitis with fluctuance. I counseled the patient on the  nature of the problem, proposed course of procedure, potential benefits,  risks, complications, and convalescence in detail. All questions have  been answered to her apparent satisfaction. No guarantees have been  given as to the outcome of the procedure. OPERATIVE PROCEDURE:  Under mild sedation, the patient was brought to  the operating room and placed on the operating table in supine position. The left lower extremity was scrubbed, prepped, and draped in the usual  sterile fashion. At this time using a #10 blade, I performed a linear  incision over the anterior aspect of the left leg, this was _____ 7 cm  in length. At this point, I opened up the compartment using Metzenbaum  scissors. I was able to evacuate seropurulent drainage, which was then  cultured. However there appeared to be significant amount of necrosis  in the soft tissues. At this point, I performed a sharp excisional  debridement of the wound to and through level of deep fascia and muscle. Total measurement was 7 cm x 4.5 x 0.5 cm in dimension. At this point,  I irrigated the areas with copious amount of normal sterile saline. Post irrigation, I then applied wound VAC negative pressure therapy set  at 125 mmHg. Excellent seal was maintained. Postoperative bandage was  then applied. The patient tolerated the procedure and anesthesia well in apparent  satisfactory condition with vital signs stable and vascular status  intact to the left lower extremity. The patient was then transferred to  the PACU for further monitoring prior to readmission to the nursing  floor.         Miko Gerber DPM    D: 02/23/2021 17:09:16       T: 02/23/2021 21:37:04     ESTHER  Job#: [id number]     Doc#: [id number]    CC:

## 2021-02-24 NOTE — PROGRESS NOTES
[address] [place] [address] Hospitalist   Progress Note    Admitting Date and Time: 2/15/2021  8:07 AM  Admit Dx: Cardiac arrest [facility]) [I46.9]    Subjective/interval history:    2/16: Patient was admitted yesterday afternoon with PEA cardiac arrest, sepsis due to urinary tract infection, hyperkalemia in setting of end-stage renal disease on peritoneal dialysis. This morning she is intubated, but awake, alert, appears anxious. 2/17: Patient was extubated yesterday afternoon. She is awake, alert, oriented x3, neurologically intact. Very anxious appearing. States she has upper and lower back pain. 2/18: Patient awake, alert, somewhat somnolent, awakens easily to voice, but appears anxious when awake. She has pain all over and has generalized weakness. 2/19: Overnight, patient was seen for altered mental status and hypoxia. She was minimally responsive to sternal rub, but was protecting her airway. She was on nonrebreather mask, now on heated high flow nasal cannula oxygen. ABG did not show any significant hypercapnia. This morning, she is awake, alert, but complaining of pain all over. She is very tearful. I discussed with her that given her respiratory failure, and recent cardiac arrest, escalating pain medications would cause potential complications clean respiratory depression. 2/20: Patient awake, alert, states her pain has improved. Currently on heated high flow nasal cannula with FiO2 down to 70%. 2/21: Patient states she feels overall better today, but still has significant chest pain from chest compressions. Off of heated high flow nasal cannula oxygen, now on 8 L high flow nasal cannula oxygen. 2/22: Patient getting Echo at bedside. States she feels she needs something stronger for pain \"maybe something IV\". 2/23: Patient stated that she is a little scared with the upcoming surgery this afternoon. 2/24: Patient uncomfortable in bed. She is still complaining of 10/10 chest pain and meds not helping. Per RN:  Wound vac in place and not be changed until Friday per podiatry.      vancomycin  1,000 mg Intravenous Once    insulin glargine  3 Units Subcutaneous BID    insulin lispro  0-6 Units Subcutaneous TID WC    insulin lispro  0-3 Units Subcutaneous Nightly    sodium chloride  15 mL/kg Intravenous Once    metoprolol tartrate  25 mg Oral BID    Vitamin D  1,000 Units Oral Daily    vitamin D  50,000 Units Oral Weekly    pantoprazole  40 mg Oral QAM AC    Calcium Acetate (Phos Binder)  1,334 mg Oral TID WC    meropenem  500 mg Intravenous Q24H    sodium chloride (PF)  10 mL Intravenous Daily    ipratropium-albuterol  1 ampule Inhalation Q6H    epoetin nena-epbx  3,000 Units Subcutaneous Once per day on Mon Wed Fri    And    epoetin nena-epbx  2,000 Units Subcutaneous Once per day on Mon Wed Fri    levothyroxine  75 mcg Per NG tube QAM AC    sodium chloride flush  10 mL Intravenous 2 times per day    heparin (porcine)  5,000 Units Subcutaneous 3 times per day    vancomycin (VANCOCIN) intermittent dosing (placeholder)   Other RX Placeholder         dextrose, 12.5 g, PRN      fentanNYL, 25 mcg, Q2H PRN      oxyCODONE-acetaminophen, 1 tablet, Q4H PRN    Or      oxyCODONE-acetaminophen, 2 tablet, Q4H PRN      perflutren lipid microspheres, 1.5 mL, ONCE PRN      sodium chloride, , PRN      glucose, 15 g, PRN      dextrose, 12.5 g, PRN      glucagon (rDNA), 1 mg, PRN      dextrose, 100 mL/hr, PRN      sodium chloride flush, 10 mL, PRN      polyethylene glycol, 17 g, Daily PRN      acetaminophen, 650 mg, Q6H PRN    Or      acetaminophen, 650 mg, Q6H PRN         Objective:    BP (!) 131/95   Pulse 96   Temp 97.4 °F (36.3 °C) (Oral)   Resp 26   Ht 5' 4\" (1.626 m)   Wt 155 lb 10.3 oz (70.6 kg)   SpO2 98%   BMI 26.72 kg/m² General Appearance: Alert and oriented x3 but in mod distress from pain.  skin: warm and dry  Head: normocephalic and atraumatic  Eyes: pupils equal, round, and reactive to light, extraocular eye movements intact, conjunctivae normal  Neck: neck supple and non tender without mass   Pulmonary/Chest: Nonlabored. Diminished on the left, clear to auscultation on the right  Cardiovascular: normal rate, normal S1 and S2 and no carotid bruits  Abdomen: soft, non-distended, normal bowel sounds, no masses or organomegaly  Extremities: Left leg with wound vac in place. Neurologic: Cranial nerves II through XII grossly intact, speech normal      Recent Labs     02/23/21  2150 02/24/21  0230 02/24/21  0630    134 135   K 3.7 4.2 4.7    100 99   CO2 22 22 22   BUN 47* 43* 41*   CREATININE 8.6* 8.0* 8.0*   GLUCOSE 138* 114* 125*   CALCIUM 8.4* 8.2* 8.4*       Recent Labs     02/22/21  0513 02/23/21  0520 02/24/21  0630   ALKPHOS 241* 259* 208*   PROT 5.9* 5.8* 5.4*   LABALBU 2.6* 2.7* 2.3*   BILITOT 0.3 <0.2 <0.2   AST 17 14 18   ALT 38* 26 18       Recent Labs     02/22/21  0513 02/23/21  0520 02/24/21  0630   WBC 8.6 8.0 8.6   RBC 2.74* 2.72* 2.64*   HGB 8.0* 8.0* 7.7*   HCT 24.6* 25.4* 24.4*   MCV 89.8 93.4 92.4   MCH 29.2 29.4 29.2   MCHC 32.5 31.5* 31.6*   RDW 16.1* 16.6* 16.3*    366 420   MPV 10.4 10.4 10.1       Culture, Surgical [[id number]] Collected: 02/23/21 1644     Specimen: Tissue Updated: 02/24/21 0915      Culture Surgical Growth not present, incubation continues     Narrative:       Source: TISSU       Site: Tissue&TissueLeg Left                  Gram Stain [[id number]] Collected: 02/23/21 1644     Specimen: Tissue Updated: 02/24/21 3658      Gram Stain Orderable --      Gram stain performed from swab, interpret results with   caution. Swab specimens of sterile fluids are inferior to   aspirate specimens for organism recovery.    Rare Polymorphonuclear leukocytes   Epithelial cells not seen No organisms seen      Narrative:       Source: TISSU       Site: Tissue&TissueLeg Left        Culture, Wound [[id number]] Collected: 02/22/21 1130     Specimen: Leg Updated: 02/23/21 0940      WOUND/ABSCESS Growth not present, incubation continues     Narrative:       Source: LEG       Site: Left LEG                   Gram stain [[id number]] Collected: 02/22/21 1130     Specimen: Leg Updated: 02/23/21 0742      Gram Stain Orderable --      Gram stain performed from swab, interpret results with   caution. Swab specimens of sterile fluids are inferior to   aspirate specimens for organism recovery. Polymorphonuclear leukocytes not seen   Epithelial cells not seen   No organisms seen      Narrative:       Source: LEG       Site: Left LEG              Culture, Blood 1 [[id number]] Collected: 02/18/21 2152     Specimen: Blood Updated: 02/24/21 0835      Blood Culture, Routine 5 Days no growth     Narrative:       Source: BLOOD       Site: right forearm         Culture, Blood 2 [[id number]] Collected: 02/18/21 2152     Specimen: Blood Updated: 02/24/21 0935      Culture, Blood 2 5 Days no growth     Narrative:       Source: BLOOD       Site: right ac        Radiology:   XR CHEST PORTABLE   Preliminary Result   1. Stable cardiomegaly. 2. Patchy bilateral airspace disease unchanged when compared to the prior   study. XR CHEST PORTABLE   Final Result   Increased consolidation right upper lobe concerning for pneumonia or   developing atelectasis. Diffuse edema/ARDS unchanged. Suspect pulmonary venous hypertension. XR TIBIA FIBULA LEFT (2 VIEWS)   Final Result   No osseous abnormality. No discrete soft tissue abnormality. Mild diffuse edema      XR CHEST PORTABLE   Final Result   No change in bilateral airspace opacities. US DUP LOWER EXTREMITY LEFT RODO   Final Result   No evidence of DVT in the left lower extremity.       XR CHEST PORTABLE   Final Result 1. No interval change in the multifocal bilateral diffuse hazy pulmonary   infiltrates. XR CHEST PORTABLE   Final Result   Persistent but perhaps slightly improved bilateral diffuse ill-defined   opacities left slightly greater than right. RECOMMENDATION:   Continued radiographic follow-up suggested. XR CHEST PORTABLE   Final Result   Improvement in right-sided opacities and worsening in left-sided airspace   opacities. Finding may represent pulmonary edema versus atelectasis. XR CHEST PORTABLE   Final Result   Extensive primarily right-sided pulmonary infiltrates new since the prior exam      CT HEAD WO CONTRAST   Final Result   No acute intracranial abnormality. XR ABDOMEN FOR NG/OG/NE TUBE PLACEMENT   Final Result   1. ET tube in satisfactory position. No evidence of acute intrathoracic   disease. 2. NG tube tip in the distal stomach. 3. Nonspecific small bowel distension that could be related to ileus or   small-bowel obstruction. 4. Suspected ascites. XR CHEST PORTABLE   Final Result   1. ET tube in satisfactory position. No evidence of acute intrathoracic   disease. 2. NG tube tip in the distal stomach. 3. Nonspecific small bowel distension that could be related to ileus or   small-bowel obstruction. 4. Suspected ascites. VL KEY BILATERAL LIMITED 1-2 LEVELS    (Results Pending)      TTE procedure:Echo Complete W/Doppler & Color Flow.       Procedure Date   Date: 02/22/2021 Start: 10:33 AM     Study Location: Portable   Technical Quality: Adequate visualization     Indications:S/P Cardiac Arrest.     Patient Status: Routine     Height: 64 inches Weight: 160 pounds BSA: 1.78 m^2 BMI: 27.46 kg/m^2     BP: 126/77 mmHg      Findings        Left Ventricle    Normal left ventricular chamber size.    Normal left ventricular systolic function, LVEF is 68%.    Moderate left ventricular concentric hypertrophy noted.    Normal diastolic function.      Assessment/Plan: Principal Problem:    Cardiac arrest [facility])  Active Problems:    ESRD on peritoneal dialysis ([facility]    Hypertension    Diabetic ketoacidosis with coma associated with type 1 diabetes mellitus ([facility]    UTI (urinary tract infection)    Diabetes mellitus type 1, uncontrolled ([facility]    Sepsis ([facility]    Acute respiratory failure with hypoxia ([facility]    Shock liver    Bacterial pneumonia  Resolved Problems:    * No resolved hospital problems. *      1. PEA cardiac arrest with successful resuscitation  -Possibly due to arrhythmia from electrolyte abnormalities. She was only slightly hyperkalemic on blood chemistries, however this was after she was given treatment for probable hyperkalemia based on EKG findings  -Monitor electrolytes and correct as necessary  -Treating potential underlying causes including electrolyte abnormalities, DKA, sepsis  - Repeat Echo 7/71 shows systolic and diastolic function wnl.    2.  Acute hypoxic respiratory failure due to bacterial pneumonia  -Antibiotics as noted below  -Improving  -She was on 8 L high flow nasal cannula oxygen but today down to 2L today.  -Basal and corrective scale insulin for now. Plan to resume insulin pump at discharge     3. DKA in the setting of uncontrolled type I DM  -Back on insulin drip and D5 1/2 NS as NPO. 4.  Sepsis due to UTI/right-sided pneumonia  -Continue vancomycin, and meropenem day #9 total of antibiotics. -Blood cultures drawn on 2/15 and repeat cultures on 2/18 showing no growth to date. 5.  Hyperkalemia  -Resolved  -Continue peritoneal dialysis, nephrology following     6. ESRD on peritoneal dialysis  -Nephrology following for dialysis management     8. Shock liver  -Transaminases continue to improve, bilirubin normal. AST 18 and ALT 18    9.  Left leg bullae with surrounding erythema c/w necrotizing fascitits  -podiatry evaluated and took for  surgery for debridement 2/23 and wound vac was applied -necrotizing infection was found and extensive debridement was found. ID will be consulted. 10. Essential HTN  -Metoprolol tartrate resumed 2/17 her blood pressure has been well controlled     Code Status: Full code  DVT prophylaxis: Subcutaneous heparin    Case discussed with Dr. Kush Lewis of podiatry earlier today. Case discussed with Dr. Cintia Sotelo of pulmonary/critical care in ICU. NOTE: This report was transcribed using voice recognition software. Every effort was made to ensure accuracy; however, inadvertent computerized transcription errors may be present.      Electronically signed by Mari Bar MD on 2/24/2021 at 10:28 AM

## 2021-02-24 NOTE — PROGRESS NOTES
Sent Rafael Bravo a secured message via perfect serve verifying she is aware of the wound care consult.

## 2021-02-24 NOTE — PROGRESS NOTES
Spoke to Dr. Nerissa Jovel regarding electrolyte replacements with the insulin infusion. Per Dr. Nerissa Jovel, patient does not need replacements at this time. Orders to notify her with BMP results at noon.    Sorin Oropeza

## 2021-02-24 NOTE — PROGRESS NOTES
Patient moaning out in pain and requesting that Dr. Raymond Fields be called for different pain medication. Lidocaine patch, percocet, and fentanyl have been administered throughout the day. Patient now states that these have not relieved her pain at all, though she did admit to decreased pain earlier in shift after lidocaine patch application. She rates her pain as 10/10, constant, and unchanging. Patient adamantly refusing all forms of nonpharmacologic interventions for pain control. At current time, patient is talking on the phone without difficulty and is no longer moaning or crying throughout the duration of her phone call. Called Dr. Raymond Fields. Awaiting callback at this time. Dr. Raymond Fields called back. Informed him of situation. Orders to change the ordered 25mcg of IV Fentanyl for Moderate pain and add 50 mcg IV for severe pain. Okay to administer 50mcgs now.    Payam Garcia

## 2021-02-24 NOTE — CARE COORDINATION
2/24/21 Negative covid 2/15/21. Cm transition of care: POD #1 lower extremity I&D. Cm spoke to pt and she realizes she will be needing some rehab at discharge and more care- capd, wound, therapies, iv antibiotics (need picc), oxygen. Patient requested CM speak to her mom on discharge choices. LTACH options discussed with [facility] and she would like [facility] first choice and alternate discharge LTACH [facility]. If SNF needed capd can only be done at [facility] on [facility]. Cm/SS following.  Electronically signed by Fatoumata Vargas RN-BC on 2/24/2021 at 9:39 AM

## 2021-02-24 NOTE — PROGRESS NOTES
Podiatry Resident Progress Note    SUBJECTIVE:   Patient seen bedside s/p incision and drainage of left leg abscess, excisional debridement to left leg wound to and through level of deep fascia and muscle, and application of wound vac (DOS 2/23/21). Seen with Dr. Nasra Whitfield and nursing team present. State she has continued pain in the center of her chest, aggravated by movement and breathing. Patient states her leg feels fine today and is not bothering her at the moment. Admits to shortness of breath and chest pain. She denies fevers, chills, nausea, vomiting. No other lower extremity complaints today. OBJECTIVE:    VITALS:  Vitals:    02/24/21 0700   BP: (!) 114/92   Pulse: 94   Resp: 10   Temp:    SpO2: 100%        LABS:   Recent Labs     02/22/21  0513 02/22/21  1428 02/23/21  0520 02/24/21  0630   WBC 8.6  --  8.0 8.6   HGB 8.0*  --  8.0* 7.7*   HCT 24.6*  --  25.4* 24.4*     --  366 420   CRP  --  12.8*  --   --    SEDRATE  --  95*  --   --    PROCAL  --  6.14*  --   --    ASO  --  46  --   --        PHYSICAL EXAM: Dressing left intact today. Wound vac functioning with minimal serosanguinous drainage in canister. Dressing CDI. Able to wiggle digits. Toes warm and well perfused. No pain on compression of posterior calves or with palpation to left leg wound. Moderate edema noted to distal left lower extremity, non-pitting. Pedal and popliteal pulses faintly palpable. Sensation grossly intact. IO site noted bilaterally on proximal tibia, not at the level of the bullous lesions / sx site. Pre-op imaging below:            IMAGING:  Xr Tibia Fibula Left (2 Views)    Result Date: 2/22/2021  No osseous abnormality. No discrete soft tissue abnormality. Mild diffuse edema    Us Dup Lower Extremity Left Khurram    Result Date: 2/20/2021  No evidence of DVT in the left lower extremity.        ASSESEMENT: 1. Sp incision and drainage of left leg abscess, excisional debridement to left leg wound to and through level of deep fascia and muscle, and application of wound vac (DOS 2/23/21)  2. Necrotizing soft tissue infection, left leg  3. Left leg edema  4. Type I diabetes, uncontrolled  5. DKA  6. Pneumonia    Principal Problem:    Cardiac arrest [facility])    Active Problems:    Diabetic ketoacidosis with coma associated with type 1 diabetes mellitus ([facility] [address]    UTI (urinary tract infection)    Diabetes mellitus type 1, uncontrolled ([facility] [address]    Sepsis ([facility] [address]    Hypertension    Acute respiratory failure with hypoxia ([facility] [address]    ESRD on peritoneal dialysis ([facility] [address]    Shock liver    Bacterial pneumonia     PLAN:  -Will reevaluate on Friday when vac changed, follow labs until then. May need further debridement / secondary washout if no significant improvement is noted at this time.   -Spoke with Dr. Paty Niño today regarding intraoperative findings - necrotizing soft tissue infection. Thrombosed vessels and necrosed tissue within the fascial layers. Discussed ID consultation which may helpful in the setting of multiple infectious processes, including necrotizing infection of left leg. -XR reviewed. No osseous pathology noted. No ST emphysema.  -Sx cx and path pending finals. Stain shows few PMN with no orgs.  -Follow labs. WBC 8.4 today. -Abx per IM/CC/ID teams. Vanc/meropenem now.    -Dressings: Wound vac, as per ordered.  -Will continue to follow while in hospital.    DW: Adriel Galan DPM FACFAS  Fellowship-Trained Foot and Ankle Surgeon  Diplomate, American Board of Foot and Ankle Surgeons  [phone]

## 2021-02-24 NOTE — CARE COORDINATION
2/24/21 Negative covid 2/15/21. CM transition of care POD#1 - with new wound vac- for discharge to [facility]- they will start the precert for discharge. Alternate discharge to [facility]. If no ltach approved- [facility] is doing [facility]d now- this would need reviewed with pts mom if discharge to ltach denied. Ambulance form in soft blue chart.  Electronically signed by Carmen Luo RN-BC on 2/24/2021 at 1:49 PM

## 2021-02-24 NOTE — PROGRESS NOTES
Pharmacy Consultation Note  (Antibiotic Dosing and Monitoring)    Initial consult date: 2/15/2021  Consulting physician: Dr. Radha Silva  Drug(s): Vancomycin  Indication: Sepsis    Ht Readings from Last 1 Encounters:   02/19/21 5' 4\" (1.626 m)     Wt Readings from Last 1 Encounters:   02/22/21 155 lb 10.3 oz (70.6 kg)     Age/  Gender IBW DW  Allergy Information   31 y.o.  female 54.7 kg 64.3 kg  Cefepime and Toradol [ketorolac tromethamine]         Date  Tmax WBC Dialysis Drug/Dose Time   Given Level(s)   (Time) Comments   2/15  (#1) -- 8.1 PD Vancomycin 1500 mg IV x 1 1745     2/16  (#2) 99.4 14.2 PD No vancomycin -- Random level @ 0845 = 27.1 mcg/mL    2/17  (#3) 102 20 PD No vancomycin --     2/18  (#4) 102.9 20.4 PD No vancomycin -- Random level @ 0522 = 19.8 mcg/mL    2/19  (#5) 99.9 17.5 PD Vancomycin 1000 mg IV x 1 1042     2/20  (#6) 101.1 17.3 PD No vancomycin --     2/21  (#7) 99.5 9.8 PD No vancomycin -- Random level @0527 = 31.3 mcg/mL    2/22  (#8) 99.4 8.6 PD No vancomycin --     2/23  (#9) 99 8 PD No vancomycin --     2/24  (#10 afebrile 8.6 PD Vancomycin 1000 mg IV x 1 <1000> Random level @ 0630 = 17.8 mcg/mL                Estimated Creatinine Clearance: 10 mL/min (A) (based on SCr of 8 mg/dL (H)). UOP over the past 24 hours:       Intake/Output Summary (Last 24 hours) at 2/24/2021 0914  Last data filed at 2/24/2021 0514  Gross per 24 hour   Intake 4019 ml   Output 5 ml   Net 4014 ml       Anti-infective Regimen:  Anti-infective Dose Date Initiated Date Stopped   Ceftriaxone 1000 mg IV q24hr 2/15 2/17   Pip/tazo 3.375g IV q12hr 2/17 2/18   Meropenem 500 mg q24hr 2/18      Cultures:  available culture and sensitivity results were reviewed in EPIC  Cultures sent and are pending.   Culture Date Result    Blood cx 1 2/15 No growth   Blood cx 2 2/15 No growth   Respiratory cx 2/18 Serratia marcescens  Candida albicans  Few GP Diplococci   Blood cx 1 2/18 No growth   Blood cx 2 2/18 No growth Wound cx 2/22 NGTD   Surgical cx 2/23 NGTD   Anaerobic cx 2/23    Fungal cx 2/23      Assessment:  · Consulted by Dr. Whitman Halsted to dose/monitor vancomycin  · Goal trough level:  15-20 mcg/mL, AUC/DEENA:   400-600  · Pt is a 31 yoF who presented from home in PEA arrest. Patient has hx of ESRD on peritoneal dialysis, diabetes, in DKA this admission, and multiple hospitalizations. Empiric antibiotics initiated for sepsis. · Hx ESRD, HD on peritoneal dialysis. · 2/16: Random level = 27.1 mcg/mL  · 2/18: Random level = 19.8 mcg/mL  · 2/21: Random level = 31.3 mcg/mL  · 2/23: Pt underwent I&D for left leg wounds and found to have necrotizing infection. · 2/24: Random level = 17.8 mcg/mL.  ID now consulted    Plan:  · Vancomycin 1000 mg IV x 1 today  · Follow dialysis schedule for further dosing and levels  · Pharmacist will follow and monitor/adjust dosing as necessary      Thank you for the consult,    Mirta Landis, PharmD, BCPS 2/24/2021 9:14 AM   Ext: [phone]

## 2021-02-24 NOTE — PROGRESS NOTES
Physical Therapy Treatment Note    Room #:  IC10/IC10-01  Patient Name: Randi Paulino  YOB: 1990  MRN: [id number]    Referring Provider:   Ameya Barker MD     Date of Service: 2021    Evaluating Physical Therapist: Matt Ramos, PT #[id number]       Diagnosis:   Cardiac arrest ([facility] [address] [I46.9]    unresponsiveness.  cardiac arrest    Patient Active Problem List   Diagnosis    High-risk pregnancy    Diabetic ketoacidosis with coma associated with type 1 diabetes mellitus ([address]    Previous stillbirth or demise, antepartum    Non compliance w medication regimen    UTI (urinary tract infection)    Tobacco smoking complicating pregnancy    Drug use complicating pregnancy in third trimester    MTHFR mutation ([address]    Poorly controlled type 1 diabetes mellitus ([address]    Diabetes mellitus type 1, uncontrolled ([address]    Previous  delivery affecting pregnancy, antepartum    Opioid abuse, episodic ([address]    Tobacco use    Sepsis ([address]    Hypoglycemia    Hypertension    Type 1 diabetes mellitus with other specified complication ([address]    Diabetic gastroparesis associated with type 1 diabetes mellitus (HCC)    Iron deficiency anemia    Acute respiratory failure with hypoxia (HCC)    Sinus tachycardia    Bladder dysfunction    Acute heart failure with preserved ejection fraction (HCC)    Chronic kidney disease    Severe protein-calorie malnutrition ([address]    ESRD (end stage renal disease) ([address]    Uncontrolled type 1 diabetes mellitus with hyperglycemia, with long-term current use of insulin ([address]    ESRD on peritoneal dialysis ([address]    Hypothyroidism    Hyperlipidemia    Acute cystitis without hematuria    Nondisplaced fracture of neck of fifth metacarpal bone, left hand, initial encounter for closed fracture    Acute encephalopathy    Effusion of left knee    Acute metabolic encephalopathy    Chronic right-sided low back pain    Endocarditis    Chronic diarrhea  Valvular endocarditis    Hyperosmolar hyperglycemic state (HHS) ([address]    Seizure ([address]    Hypothermia    Hyperkalemia    Diabetic polyneuropathy associated with type 1 diabetes mellitus ([address]    Cardiac arrest ([address]    Shock liver    Bacterial pneumonia        Tentative placement recommendation: Subacute rehab    Equipment recommendation:  To be determined      Prior Level of Function: Patient ambulated independently    Rehab Potential: fair    for baseline    Past medical history:   Past Medical History:   Diagnosis Date    Acute congestive heart failure ([address]     BC (acute kidney injury) ([address] 10/1/2019    Cephalgia 10/9/2019    Chronic kidney disease     Depression     Diabetes mellitus ([address]     Diabetic ketoacidosis ([address] 2011    Hemodialysis patient ([facility] [address]     History of blood transfusion 2019    Hyperlipidemia 10/8/2020    Hypothyroidism 10/8/2020    Iron deficiency anemia 10/1/2019    MDRO (multiple drug resistant organisms) resistance     MRSA (methicillin resistant Staphylococcus aureus)     back wound abcess    Non compliance w medication regimen 3/30/2016    Other disorders of kidney and ureter     Pregnancy 3/30/2016    16 weeks    Seizure ([address] 2020    Severe pre-eclampsia in third trimester 2016    Shock liver 2/15/2021     Past Surgical History:   Procedure Laterality Date    BACK SURGERY      abscess     SECTION      x2    CHOLECYSTECTOMY, LAPAROSCOPIC N/A 2019    CHOLECYSTECTOMY LAPAROSCOPIC performed by Riki Ferrell MD at [address] N/A 2018    COLONOSCOPY WITH BIOPSY performed by Irineo De La Garza MD at [facility] N/A 2018    COLONOSCOPY WITH BIOPSY performed by Johanna Valle MD at [id number] [place] [address] ECHO COMPL [address]  2012    EF 57%    ECHO COMPL W DOP COLOR FLOW  6/10/2013         EMBOLECTOMY N/A 2020 [facility], [address] [facility], [place] -- REQS ROOM 3 performed by Kai Sparrow MD at [facility] Left 2/23/2021    LEFT LEG INCISION AND DRAINAGE, DEBRIDEMENT, WOUND VAC APPLICATION performed by Radha Esteves DPM at [facility] N/A 6/26/2020    LAPAROSCOPIC INSERTION PERITONEAL DIALYSIS CATHETER performed by Dorinda Shields MD at [facility] ESOPHAGOGASTRODUODENOSCOPY TRANSORAL DIAGNOSTIC N/A 5/30/2018    EGD ESOPHAGOGASTRODUODENOSCOPY performed by Brayan Gardiner MD at [address] [place] [address] TRANSESOPHAGEAL ECHOCARDIOGRAM N/A 10/19/2020    TRANSESOPHAGEAL ECHOCARDIOGRAM WITH BUBBLE STUDY performed by Patti Malik MD at [address] [place] [address] TUNNELED VENOUS PORT PLACEMENT  04/2018    UPPER GASTROINTESTINAL ENDOSCOPY  12/18/2018    EGD BIOPSY performed by Vale Lai MD at [address] [facility] N/A 10/11/2019    EGD ESOPHAGOGASTRODUODENOSCOPY performed by Chris Mcqueen DO at [facility] ENDOSCOPY       Precautions: Use lift equipment for lifting patient , falls, alarm, O2 and lethargy\ ,  end-stage renal disease on peritoneal dialysis,    SUBJECTIVE:    Social history: Patient lives with mom and her 2 children   in a [place] home  with 8 steps  to enter with Rail  Tub shower     Equipment owned: None,       [facility] Mobility Inpatient   How much difficulty turning over in bed?: A Lot  How much difficulty sitting down on / standing up from a chair with arms?: A Lot  How much difficulty moving from lying on back to sitting on side of bed?: A Lot  How much help from another person moving to and from a bed to a chair?: A Lot  How much help from another person needed to walk in hospital room?: A Lot  How much help from another person for climbing 3-5 steps with a railing?: A Lot  AM-PAC Inpatient Mobility Raw Score : 12  AM-PAC Inpatient T-Scale Score : 35.33  Mobility Inpatient CMS 0-100% Score: 68.66 Patient educated on role of Physical Therapy, risks of immobility, safety and plan of care,  importance of mobility while in hospital , ankle pumps, quad set and glut set for edema control, blood clot prevention and positioning for skin integrity and comfort      Patient response to education:   Pt verbalized understanding Pt demonstrated skill Pt requires further education in this area   Yes Partial Yes      Treatment:  Patient practiced and was instructed/facilitated in the following treatment: patient transferred to edge of the bed. Pt  :Sat edge of bed 15 minutes with Supervision  to increase dynamic sitting balance and activity tolerance. Worked on sitting balance and maintaining mid-line position due to right lateral lean. Performed LE & UE exercises. Pt returned to supine. Therapeutic Exercises:  ankle pumps, hip abduction/adduction and long arc quad, elbow, wrist, flex/ext, sup/pro, grasp/relax,   x 15-20 reps. Active assisted ex with cues for increased participation       At end of session, patient in bed with alarm call light and phone within reach,   all lines and tubes intact, nursing notified. Patient would benefit from continued skilled Physical Therapy to improve functional independence and quality of life. Patient's/ family goals   none stated        ASSESSMENT: Patient exhibits decreased strength, balance and endurance impairing functional mobility, transfers, gait , tolerance to activity and participation . Patient's pain level is the biggest barrier to progressing with all functional mobility. Pt needing minimal assist with sitting balance due to right lateral lean.   Plan of Care:     -Bed Mobility: Lower extremity exercises  and Upper extremity exercises   -Sitting Balance: Hands on support to maintain midline , Facilitate active trunk muscle engagement  and Facilitate postural control in all planes

## 2021-02-24 NOTE — FLOWSHEET NOTE
CCPD tx initiated using strict aseptic technique, effluent is clear.    CCPD, All 2.5% Dextrose 4 exchanges of 2 L each of 2.5% dextrose over 9 hours with last fill of 2 L of 2.5%) total 10 L)

## 2021-02-25 ENCOUNTER — APPOINTMENT (OUTPATIENT)
Dept: INTERVENTIONAL RADIOLOGY/VASCULAR | Age: 31
DRG: 710 | End: 2021-02-25
Payer: COMMERCIAL

## 2021-02-25 LAB
ALBUMIN SERPL-MCNC: 2.4 G/DL (ref 3.5–5.2)
ALP BLD-CCNC: 232 U/L (ref 35–104)
ALT SERPL-CCNC: 12 U/L (ref 0–32)
ANION GAP SERPL CALCULATED.3IONS-SCNC: 14 MMOL/L (ref 7–16)
AST SERPL-CCNC: 17 U/L (ref 0–31)
BILIRUB SERPL-MCNC: <0.2 MG/DL (ref 0–1.2)
BUN BLDV-MCNC: 39 MG/DL (ref 6–20)
CALCIUM SERPL-MCNC: 9.2 MG/DL (ref 8.6–10.2)
CHLORIDE BLD-SCNC: 98 MMOL/L (ref 98–107)
CO2: 22 MMOL/L (ref 22–29)
CREAT SERPL-MCNC: 8.1 MG/DL (ref 0.5–1)
GFR AFRICAN AMERICAN: 7
GFR NON-AFRICAN AMERICAN: 7 ML/MIN/1.73
GLUCOSE BLD-MCNC: 348 MG/DL (ref 74–99)
HCT VFR BLD CALC: 26.9 % (ref 34–48)
HEMOGLOBIN: 8.2 G/DL (ref 11.5–15.5)
MAGNESIUM: 1.9 MG/DL (ref 1.6–2.6)
MCH RBC QN AUTO: 28.7 PG (ref 26–35)
MCHC RBC AUTO-ENTMCNC: 30.5 % (ref 32–34.5)
MCV RBC AUTO: 94.1 FL (ref 80–99.9)
METER GLUCOSE: 168 MG/DL (ref 74–99)
METER GLUCOSE: 185 MG/DL (ref 74–99)
METER GLUCOSE: 208 MG/DL (ref 74–99)
METER GLUCOSE: 385 MG/DL (ref 74–99)
PDW BLD-RTO: 16.2 FL (ref 11.5–15)
PHOSPHORUS: 7.4 MG/DL (ref 2.5–4.5)
PLATELET # BLD: 491 E9/L (ref 130–450)
PMV BLD AUTO: 10.1 FL (ref 7–12)
POTASSIUM SERPL-SCNC: 4.3 MMOL/L (ref 3.5–5)
RBC # BLD: 2.86 E12/L (ref 3.5–5.5)
SODIUM BLD-SCNC: 134 MMOL/L (ref 132–146)
TOTAL PROTEIN: 5.7 G/DL (ref 6.4–8.3)
WBC # BLD: 9.4 E9/L (ref 4.5–11.5)

## 2021-02-25 PROCEDURE — 83735 ASSAY OF MAGNESIUM: CPT

## 2021-02-25 PROCEDURE — 82962 GLUCOSE BLOOD TEST: CPT

## 2021-02-25 PROCEDURE — 2700000000 HC OXYGEN THERAPY PER DAY

## 2021-02-25 PROCEDURE — 2580000003 HC RX 258: Performed by: STUDENT IN AN ORGANIZED HEALTH CARE EDUCATION/TRAINING PROGRAM

## 2021-02-25 PROCEDURE — 6370000000 HC RX 637 (ALT 250 FOR IP): Performed by: STUDENT IN AN ORGANIZED HEALTH CARE EDUCATION/TRAINING PROGRAM

## 2021-02-25 PROCEDURE — 97530 THERAPEUTIC ACTIVITIES: CPT

## 2021-02-25 PROCEDURE — 85027 COMPLETE CBC AUTOMATED: CPT

## 2021-02-25 PROCEDURE — 6360000002 HC RX W HCPCS: Performed by: STUDENT IN AN ORGANIZED HEALTH CARE EDUCATION/TRAINING PROGRAM

## 2021-02-25 PROCEDURE — 6370000000 HC RX 637 (ALT 250 FOR IP): Performed by: INTERNAL MEDICINE

## 2021-02-25 PROCEDURE — 99233 SBSQ HOSP IP/OBS HIGH 50: CPT | Performed by: INTERNAL MEDICINE

## 2021-02-25 PROCEDURE — 02HV33Z INSERTION OF INFUSION DEVICE INTO SUPERIOR VENA CAVA, PERCUTANEOUS APPROACH: ICD-10-PCS | Performed by: INTERNAL MEDICINE

## 2021-02-25 PROCEDURE — 84100 ASSAY OF PHOSPHORUS: CPT

## 2021-02-25 PROCEDURE — 90945 DIALYSIS ONE EVALUATION: CPT

## 2021-02-25 PROCEDURE — 2720000010 HC SURG SUPPLY STERILE

## 2021-02-25 PROCEDURE — 76937 US GUIDE VASCULAR ACCESS: CPT

## 2021-02-25 PROCEDURE — 2500000003 HC RX 250 WO HCPCS: Performed by: STUDENT IN AN ORGANIZED HEALTH CARE EDUCATION/TRAINING PROGRAM

## 2021-02-25 PROCEDURE — C1751 CATH, INF, PER/CENT/MIDLINE: HCPCS

## 2021-02-25 PROCEDURE — 80053 COMPREHEN METABOLIC PANEL: CPT

## 2021-02-25 PROCEDURE — 94640 AIRWAY INHALATION TREATMENT: CPT

## 2021-02-25 PROCEDURE — 93922 UPR/L XTREMITY ART 2 LEVELS: CPT

## 2021-02-25 PROCEDURE — 2060000000 HC ICU INTERMEDIATE R&B

## 2021-02-25 PROCEDURE — 97110 THERAPEUTIC EXERCISES: CPT

## 2021-02-25 PROCEDURE — 6360000002 HC RX W HCPCS: Performed by: INTERNAL MEDICINE

## 2021-02-25 PROCEDURE — 36569 INSJ PICC 5 YR+ W/O IMAGING: CPT

## 2021-02-25 RX ORDER — HEPARIN SODIUM (PORCINE) LOCK FLUSH IV SOLN 100 UNIT/ML 100 UNIT/ML
3 SOLUTION INTRAVENOUS EVERY 12 HOURS SCHEDULED
Status: DISCONTINUED | OUTPATIENT
Start: 2021-02-25 | End: 2021-02-27 | Stop reason: HOSPADM

## 2021-02-25 RX ORDER — FENTANYL CITRATE 50 UG/ML
50 INJECTION, SOLUTION INTRAMUSCULAR; INTRAVENOUS
Status: DISCONTINUED | OUTPATIENT
Start: 2021-02-25 | End: 2021-02-27

## 2021-02-25 RX ORDER — HEPARIN SODIUM (PORCINE) LOCK FLUSH IV SOLN 100 UNIT/ML 100 UNIT/ML
3 SOLUTION INTRAVENOUS PRN
Status: DISCONTINUED | OUTPATIENT
Start: 2021-02-25 | End: 2021-02-27 | Stop reason: HOSPADM

## 2021-02-25 RX ORDER — FENTANYL CITRATE 50 UG/ML
25 INJECTION, SOLUTION INTRAMUSCULAR; INTRAVENOUS
Status: DISCONTINUED | OUTPATIENT
Start: 2021-02-25 | End: 2021-02-27 | Stop reason: HOSPADM

## 2021-02-25 RX ORDER — SODIUM CHLORIDE 0.9 % (FLUSH) 0.9 %
10 SYRINGE (ML) INJECTION PRN
Status: DISCONTINUED | OUTPATIENT
Start: 2021-02-25 | End: 2021-02-27 | Stop reason: HOSPADM

## 2021-02-25 RX ORDER — LIDOCAINE HYDROCHLORIDE 10 MG/ML
5 INJECTION, SOLUTION EPIDURAL; INFILTRATION; INTRACAUDAL; PERINEURAL ONCE
Status: DISCONTINUED | OUTPATIENT
Start: 2021-02-25 | End: 2021-02-27 | Stop reason: HOSPADM

## 2021-02-25 RX ADMIN — INSULIN LISPRO 1 UNITS: 100 INJECTION, SOLUTION INTRAVENOUS; SUBCUTANEOUS at 20:02

## 2021-02-25 RX ADMIN — OXYCODONE AND ACETAMINOPHEN 1 TABLET: 5; 325 TABLET ORAL at 08:28

## 2021-02-25 RX ADMIN — IPRATROPIUM BROMIDE AND ALBUTEROL SULFATE 1 AMPULE: .5; 3 SOLUTION RESPIRATORY (INHALATION) at 17:55

## 2021-02-25 RX ADMIN — Medication 1000 UNITS: at 08:29

## 2021-02-25 RX ADMIN — HEPARIN 300 UNITS: 100 SYRINGE at 20:00

## 2021-02-25 RX ADMIN — Medication 10 ML: at 20:01

## 2021-02-25 RX ADMIN — FENTANYL CITRATE 50 MCG: 50 INJECTION INTRAMUSCULAR; INTRAVENOUS at 02:51

## 2021-02-25 RX ADMIN — OXYCODONE AND ACETAMINOPHEN 2 TABLET: 5; 325 TABLET ORAL at 20:00

## 2021-02-25 RX ADMIN — IPRATROPIUM BROMIDE AND ALBUTEROL SULFATE 1 AMPULE: .5; 3 SOLUTION RESPIRATORY (INHALATION) at 22:32

## 2021-02-25 RX ADMIN — INSULIN LISPRO 2 UNITS: 100 INJECTION, SOLUTION INTRAVENOUS; SUBCUTANEOUS at 17:03

## 2021-02-25 RX ADMIN — PANTOPRAZOLE SODIUM 40 MG: 40 TABLET, DELAYED RELEASE ORAL at 05:19

## 2021-02-25 RX ADMIN — INSULIN GLARGINE 3 UNITS: 100 INJECTION, SOLUTION SUBCUTANEOUS at 20:02

## 2021-02-25 RX ADMIN — METOPROLOL TARTRATE 25 MG: 25 TABLET, FILM COATED ORAL at 20:00

## 2021-02-25 RX ADMIN — INSULIN LISPRO 5 UNITS: 100 INJECTION, SOLUTION INTRAVENOUS; SUBCUTANEOUS at 08:43

## 2021-02-25 RX ADMIN — HEPARIN SODIUM 5000 UNITS: 5000 INJECTION INTRAVENOUS; SUBCUTANEOUS at 22:00

## 2021-02-25 RX ADMIN — INSULIN GLARGINE 3 UNITS: 100 INJECTION, SOLUTION SUBCUTANEOUS at 09:14

## 2021-02-25 RX ADMIN — Medication 10 ML: at 08:29

## 2021-02-25 RX ADMIN — FENTANYL CITRATE 25 MCG: 50 INJECTION, SOLUTION INTRAMUSCULAR; INTRAVENOUS at 10:58

## 2021-02-25 RX ADMIN — HEPARIN SODIUM 5000 UNITS: 5000 INJECTION INTRAVENOUS; SUBCUTANEOUS at 05:18

## 2021-02-25 RX ADMIN — CALCIUM ACETATE 1334 MG: 667 CAPSULE ORAL at 08:29

## 2021-02-25 RX ADMIN — FENTANYL CITRATE 25 MCG: 50 INJECTION INTRAMUSCULAR; INTRAVENOUS at 18:49

## 2021-02-25 RX ADMIN — FENTANYL CITRATE 25 MCG: 50 INJECTION, SOLUTION INTRAMUSCULAR; INTRAVENOUS at 15:34

## 2021-02-25 RX ADMIN — IPRATROPIUM BROMIDE AND ALBUTEROL SULFATE 1 AMPULE: .5; 3 SOLUTION RESPIRATORY (INHALATION) at 05:44

## 2021-02-25 RX ADMIN — FENTANYL CITRATE 50 MCG: 50 INJECTION INTRAMUSCULAR; INTRAVENOUS at 05:17

## 2021-02-25 RX ADMIN — HEPARIN SODIUM 5000 UNITS: 5000 INJECTION INTRAVENOUS; SUBCUTANEOUS at 15:07

## 2021-02-25 RX ADMIN — OXYCODONE AND ACETAMINOPHEN 1 TABLET: 5; 325 TABLET ORAL at 12:25

## 2021-02-25 RX ADMIN — LEVOTHYROXINE SODIUM 75 MCG: 75 TABLET ORAL at 05:19

## 2021-02-25 RX ADMIN — FENTANYL CITRATE 50 MCG: 50 INJECTION INTRAMUSCULAR; INTRAVENOUS at 13:25

## 2021-02-25 RX ADMIN — INSULIN LISPRO 1 UNITS: 100 INJECTION, SOLUTION INTRAVENOUS; SUBCUTANEOUS at 12:16

## 2021-02-25 RX ADMIN — CALCIUM ACETATE 1334 MG: 667 CAPSULE ORAL at 17:03

## 2021-02-25 RX ADMIN — IPRATROPIUM BROMIDE AND ALBUTEROL SULFATE 1 AMPULE: .5; 3 SOLUTION RESPIRATORY (INHALATION) at 12:52

## 2021-02-25 RX ADMIN — MEROPENEM 500 MG: 500 INJECTION, POWDER, FOR SOLUTION INTRAVENOUS at 09:14

## 2021-02-25 RX ADMIN — CALCIUM ACETATE 1334 MG: 667 CAPSULE ORAL at 12:16

## 2021-02-25 RX ADMIN — METOPROLOL TARTRATE 25 MG: 25 TABLET, FILM COATED ORAL at 08:29

## 2021-02-25 ASSESSMENT — PAIN DESCRIPTION - FREQUENCY
FREQUENCY: CONTINUOUS

## 2021-02-25 ASSESSMENT — PAIN DESCRIPTION - ORIENTATION
ORIENTATION: MID

## 2021-02-25 ASSESSMENT — PAIN DESCRIPTION - PAIN TYPE
TYPE: CHRONIC PAIN

## 2021-02-25 ASSESSMENT — PAIN - FUNCTIONAL ASSESSMENT
PAIN_FUNCTIONAL_ASSESSMENT: PREVENTS OR INTERFERES SOME ACTIVE ACTIVITIES AND ADLS
PAIN_FUNCTIONAL_ASSESSMENT: PREVENTS OR INTERFERES WITH MANY ACTIVE NOT PASSIVE ACTIVITIES
PAIN_FUNCTIONAL_ASSESSMENT: PREVENTS OR INTERFERES SOME ACTIVE ACTIVITIES AND ADLS
PAIN_FUNCTIONAL_ASSESSMENT: PREVENTS OR INTERFERES WITH MANY ACTIVE NOT PASSIVE ACTIVITIES
PAIN_FUNCTIONAL_ASSESSMENT: PREVENTS OR INTERFERES WITH ALL ACTIVE AND SOME PASSIVE ACTIVITIES

## 2021-02-25 ASSESSMENT — PAIN DESCRIPTION - PROGRESSION
CLINICAL_PROGRESSION: RAPIDLY IMPROVING
CLINICAL_PROGRESSION: RAPIDLY WORSENING
CLINICAL_PROGRESSION: GRADUALLY WORSENING

## 2021-02-25 ASSESSMENT — PAIN DESCRIPTION - ONSET
ONSET: GRADUAL
ONSET: ON-GOING
ONSET: GRADUAL

## 2021-02-25 ASSESSMENT — PAIN SCALES - GENERAL
PAINLEVEL_OUTOF10: 5
PAINLEVEL_OUTOF10: 9
PAINLEVEL_OUTOF10: 6
PAINLEVEL_OUTOF10: 10
PAINLEVEL_OUTOF10: 7

## 2021-02-25 ASSESSMENT — PAIN DESCRIPTION - DESCRIPTORS
DESCRIPTORS: SHARP;SORE;CONSTANT

## 2021-02-25 ASSESSMENT — PAIN DESCRIPTION - LOCATION
LOCATION: CHEST

## 2021-02-25 NOTE — PROGRESS NOTES
Podiatry Resident Progress Note    SUBJECTIVE:   Patient seen bedside s/p incision and drainage of left leg abscess, excisional debridement to left leg wound to and through level of deep fascia and muscle, and application of wound vac (DOS 2/23/21). Patient states she feels better today. She states the pain in her chest has decreased significantly and notes an increase in her appetite. She states last night she had a tingling sensation in her toes which she likens to a cramp. Patient states this occurred a couple of times and has not since returned. Patient also states she had an incident prior to her admission where she burned her left leg on a heater. She states it had healed over before her collapse, but notes it was in the same area as her current wound. She denies fevers, chills, nausea, vomiting, shortness of breath, and chest pain. No other lower extremity complaints today. OBJECTIVE:  VITALS:  Vitals:    02/25/21 0700   BP: (!) 147/87   Pulse: 106   Resp: 17   Temp:    SpO2: 100%       LABS:   Recent Labs     02/22/21  1428 02/23/21  0520 02/24/21  0630 02/25/21  0519   WBC  --  8.0 8.6 9.4   HGB  --  8.0* 7.7* 8.2*   HCT  --  25.4* 24.4* 26.9*   PLT  --  366 420 491*   CRP 12.8*  --   --   --    SEDRATE 95*  --   --   --    PROCAL 6.14*  --   --   --    ASO 46  --   --   --        PHYSICAL EXAM: Primary dressing left intact today. Wound vac functioning with minimal serosanguinous drainage in canister. Dressing CDI. Able to wiggle digits. Toes warm and well perfused. No pain on compression of posterior calves. Pain with palpation to left leg wound. Moderate edema noted to distal left lower extremity, particularly below level of dressing. Pedal and popliteal pulses faintly palpable. Sensation grossly intact. Small eschar noted bilaterally on proximal tibia, above the level of wound.            IMAGING:  Xr Tibia Fibula Left (2 Views)    Result Date: 2/22/2021 No osseous abnormality. No discrete soft tissue abnormality. Mild diffuse edema    Us Dup Lower Extremity Left Khurram    Result Date: 2/20/2021  No evidence of DVT in the left lower extremity. Micro/Path:   Recent Labs     02/22/21  1130 02/22/21  1400 02/23/21  1644   GRAMSTAINORD Gram stain performed from swab, interpret results with  caution. Swab specimens of sterile fluids are inferior to  aspirate specimens for organism recovery. Polymorphonuclear leukocytes not seen  Epithelial cells not seen  No organisms seen    --  Gram stain performed from swab, interpret results with  caution. Swab specimens of sterile fluids are inferior to  aspirate specimens for organism recovery. Rare Polymorphonuclear leukocytes  Epithelial cells not seen  No organisms seen     WNDABS Growth not present  --   --    LABANAE  --  Swab collections are low-yield and rarely indicated. Generally, the specimen volume when collected by swab is  small, reducing the probability of isolating organisms: many  organisms adhere to the fibers of the swab, which reduces the  opportunity of recovering organisms. Anaerobes were not isolated at 48 hours;  incubation continues    --    CXSURG  --   --  Growth not present, incubation continues         ASSESEMENT:  1. Sp incision and drainage of left leg abscess, excisional debridement to left leg wound to and through level of deep fascia and muscle, and application of wound vac (DOS 2/23/21)  2. Necrotizing soft tissue infection, left leg  3. Left leg edema  4. Acute compartment syndrome, left anterior leg?     Principal Problem:    Cardiac arrest [facility])    Active Problems:    Diabetic ketoacidosis with coma associated with type 1 diabetes mellitus ([address]    UTI (urinary tract infection)    Diabetes mellitus type 1, uncontrolled ([address]    Sepsis ([address]    Hypertension    Acute respiratory failure with hypoxia ([address]    ESRD on peritoneal dialysis ([address]    Shock liver    Bacterial pneumonia     PLAN: -Will reevaluate with Dr. Liat Alva present on Friday when vac changed, follow labs/micro/path until then. May need further debridement / secondary washout if no significant improvement is noted at this time. -Afebrile. WBC 8.4->9.4 today. Stain and prelim cx's show no organisms. Path pending. -Abx per IM/CC/ID teams. Vanc/meropenem now. -Dressings: Wound vac, as per ordered.  Will change Friday.  -Will continue to follow while in hospital.    DW: GEORGINA Blancas [id number]  Fellowship-Trained Foot and Ankle Surgeon  Diplomate, American Board of Foot and Ankle Surgeons  [phone]

## 2021-02-25 NOTE — PROGRESS NOTES
Patient's boyfriend arrived to visit and promptly began closing curtain and displaying suspicious behavior. I promptly entered the room to assess the situation and noticed patient appeared to be swallowing medication. I informed her boyfriend that the curtains need to remain open, as we need to monitor patient's closely in the ICU, and he seemed very offended at this statement. I notified Dr. Maggie Medina and will continue to monitor the patient. Curtains will remain open as long as patient's boyfriend is visiting. Patient denies that she took any medication, and states she had been choking when I walked into the room.    Brady Celestin

## 2021-02-25 NOTE — CARE COORDINATION
2/25/21 Negative covid 2/15/21. Cm transition of care: POD #2 - I/d leg with wound vac placement. Precert started for [facility] - alternate choice [facility]. Then may need to consider [facility] if SNF needed with capd capabilities. Ambulance sheet in soft blue chart. Cm/SS following.  Electronically signed by TOR Springer on 2/25/2021 at 11:14 AM

## 2021-02-25 NOTE — PROGRESS NOTES
Inpatient wound care note  Wound vac is intact to left leg    Plan  Wound vac dressing change friday

## 2021-02-25 NOTE — PROGRESS NOTES
CCPD disconnected and staysafe cap applied using strict aseptic technique total UF 1790.  Dressing dry intact

## 2021-02-25 NOTE — PROGRESS NOTES
[facility]  [facility]  Progress Note    NAME: Latoya Mckinley  MR:  [id number]  :   1990  DATE OF SERVICE:21    This is a face to face encounter with Wilton Flores 31 y.o. female on 21  ID following for   Chief Complaint   Patient presents with    Cardiac Arrest     to er via ems from home. the call was for unresponsiveness. ems state the pt arrested as they arrived here. initial rhythm was PEA     SUBJECTIVE:  LEFT LE COMPLICATED CELLULITIS/SOFT TISSUE INFECTION  S/P I AND d  HAS CHEST PAIN  Patient is tolerating medications. No reported adverse drug reactions. Review of systems:  As stated above in the chief complaint, otherwise negative.     Medications:  Scheduled Meds:   insulin glargine  3 Units Subcutaneous BID    insulin lispro  0-6 Units Subcutaneous TID WC    insulin lispro  0-3 Units Subcutaneous Nightly    lidocaine  1 patch Transdermal Daily    sodium chloride  15 mL/kg Intravenous Once    metoprolol tartrate  25 mg Oral BID    Vitamin D  1,000 Units Oral Daily    vitamin D  50,000 Units Oral Weekly    pantoprazole  40 mg Oral QAM AC    Calcium Acetate (Phos Binder)  1,334 mg Oral TID WC    meropenem  500 mg Intravenous Q24H    sodium chloride (PF)  10 mL Intravenous Daily    ipratropium-albuterol  1 ampule Inhalation Q6H    epoetin nena-epbx  3,000 Units Subcutaneous Once per day on     And    epoetin nena-epbx  2,000 Units Subcutaneous Once per day on     levothyroxine  75 mcg Per NG tube QAM AC    sodium chloride flush  10 mL Intravenous 2 times per day    heparin (porcine)  5,000 Units Subcutaneous 3 times per day    vancomycin (VANCOCIN) intermittent dosing (placeholder)   Other RX Placeholder     Continuous Infusions:   sodium chloride      sodium chloride      dextrose Stopped (21 1400) PRN Meds:fentanNYL **OR** fentanNYL, dextrose, oxyCODONE-acetaminophen **OR** oxyCODONE-acetaminophen, perflutren lipid microspheres, sodium chloride, glucose, dextrose, glucagon (rDNA), dextrose, sodium chloride flush, polyethylene glycol, acetaminophen **OR** acetaminophen    OBJECTIVE:  BP (!) 124/90   Pulse 113   Temp 97.4 °F (36.3 °C) (Infrared)   Resp 20   Ht 5' 4\" (1.626 m)   Wt 168 lb 4.8 oz (76.3 kg)   SpO2 94%   BMI 28.89 kg/m²   Temp  Av.9 °F (36.6 °C)  Min: 97.4 °F (36.3 °C)  Max: 98.8 °F (37.1 °C)  Constitutional:  The patient is awake, alert, and oriented. Skin:    Warm and dry. No rashes were noted. HEENT:   Round and reactive pupils. AT/NC  Neck:    Supple to movements. Chest:   No use of accessory muscles to breathe. Symmetrical expansion. Cardiovascular:  S1 and S2 are rhythmic and regular. No murmurs appreciated. Abdomen:   Positive bowel sounds to auscultation. Benign to palpation. Extremities:   No clubbing, no cyanosis,  Edema.  VACC  CNS    AAxO   Lines: pIV    Radiology:  Laboratory and Tests Review:  Lab Results   Component Value Date    WBC 9.4 2021    WBC 8.6 2021    WBC 8.0 2021    HGB 8.2 (L) 2021    HCT 26.9 (L) 2021    MCV 94.1 2021     (H) 2021     No results found for: [facility]  Lab Results   Component Value Date    ALT 12 2021    AST 17 2021    ALKPHOS 232 (H) 2021    BILITOT <0.2 2021     Lab Results   Component Value Date     2021    K 4.3 2021    K 5.4 02/15/2021    CL 98 2021    CO2 22 2021    BUN 39 2021    CREATININE 8.1 2021    CREATININE 8.0 2021    CREATININE 8.0 2021    GFRAA 7 2021    LABGLOM 7 2021    GLUCOSE 348 2021    GLUCOSE 130 2012    PROT 5.7 2021    LABALBU 2.4 2021    LABALBU 4.1 2012    CALCIUM 9.2 2021    BILITOT <0.2 2021    ALKPHOS 232 2021 AST 17 02/25/2021    ALT 12 02/25/2021     Lab Results   Component Value Date    CRP 12.8 (H) 02/22/2021    CRP 2.5 (H) 10/31/2020    CRP 43.1 (H) 11/01/2019     Lab Results   Component Value Date    SEDRATE 95 (H) 02/22/2021    SEDRATE 35 (H) 10/31/2020    SEDRATE 19 10/13/2020     Recent Labs     02/22/21  1428 02/23/21  0520 02/24/21  0630 02/25/21  0519   CRP 12.8*  --   --   --    PROCAL 6.14*  --   --   --    AST  --  14 18 17   ALT  --  26 18 12     Lab Results   Component Value Date    CHOL 95 01/14/2020    TRIG 82 01/14/2020    HDL 33 01/14/2020    LDLCALC 46 01/14/2020    LABVLDL 16 01/14/2020     Lab Results   Component Value Date/Time    VITD25 7 (L) 02/21/2021 05:27 AM       Microbiology:   No results for input(s): COVID19 in the last 72 hours. Lab Results   Component Value Date    BC 5 Days no growth 02/18/2021    BC 5 Days no growth 02/15/2021    BC 5 Days no growth 12/26/2020    BLOODCULT2 5 Days no growth 02/18/2021    BLOODCULT2 5 Days no growth 02/15/2021    BLOODCULT2 5 Days no growth 12/26/2020    ORG Serratia marcescens 02/18/2021    ORG Nadine albicans 02/18/2021    ORG Staphylococcus epidermidis 10/08/2020     WOUND/ABSCESS   Date Value Ref Range Status   02/22/2021 Growth not present  Final   12/11/2014 (A)  Final    Mixed yulisa also isolated, including:  Alpha hemolytic Strep species  Corynebacteria     12/11/2014 Heavy growth  Final     Smear, Respiratory   Date Value Ref Range Status   02/18/2021   Final    Group 6: <25 PMN's/LPF and <25 Epithelial cells/LPF  Rare Polymorphonuclear leukocytes  Rare Epithelial cells  Few Gram positive diplococci  Rare Gram negative rods           Component Value Date/Time    FUNGSM No Fungal elements seen 11/06/2020 1142    LABFUNG No growth after 4 weeks of incubation.  11/06/2020 1142       MRSA Culture Only   Date Value Ref Range Status   12/27/2020 Methicillin resistant Staph aureus not isolated  Final     CULTURE, RESPIRATORY Date Value Ref Range Status   02/18/2021 Oral Pharyngeal Celine reduced (A)  Final   02/18/2021 Rare growth  Final   02/18/2021 Moderate growth  Final     Recent Labs     02/23/21  1644   CXSURG Growth not present, incubation continues     Recent Labs     02/22/21  1130   WNDABS Growth not present        ASSESSMENT/PLAN:     1.  Cellulitis, left leg. 2.  Necrotizing infection, left leg. 3.  Ulceration, left leg with necrosis of the muscle.        PROCEDURES PERFORMED:  1.  Incision and drainage of left leg abscess. 2.  Excisional debridement of left leg wound to and through level of  deep fascia and muscle, total measurement is 7 cm x 4.5 cm x 0.5 cm in  dimension. 3.  Application of wound VAC negative pressure therapy set at 125 mmHg  continuous. S/p cardiac arrest has chest pain   VACC TO BE CHANGED TOMORROW  CKD /on PD          meropenem (MERREM) 500 mg IVPB (mini-bag), Q24H SUGGEST SWITCH TO CEFEPIME/LEVAQUIN          vancomycin (VANCOCIN) intermittent dosing (placeholder), RX Placeholder    CONT THERAPY  CX NGTD         · Monitor labs    Imaging and labs were reviewed per medical records. The patient was educated about the diagnosis, prognosis, indications, risks and benefits of treatment. An opportunity to ask questions was given to the patient/FAMILY. Thank you for involving me in the care of [address] [place] [address] I will continue to follow. Please do not hesitate to call for any questions or concerns.     Electronically signed by Anshul Stevens MD on 2/25/2021 at 9:04 AM

## 2021-02-25 NOTE — PROGRESS NOTES
Pharmacy Consultation Note  (Antibiotic Dosing and Monitoring)    Initial consult date: 2/15/2021  Consulting physician: Dr. Robert Argueta  Drug(s): Vancomycin  Indication: Sepsis    Ht Readings from Last 1 Encounters:   02/19/21 5' 4\" (1.626 m)     Wt Readings from Last 1 Encounters:   02/25/21 168 lb 4.8 oz (76.3 kg)     Age/  Gender IBW DW  Allergy Information   31 y.o.  female 54.7 kg 64.3 kg  Cefepime and Toradol [ketorolac tromethamine]         Date  Tmax WBC Dialysis Drug/Dose Time   Given Level(s)   (Time) Comments   2/15  (#1) -- 8.1 PD Vancomycin 1500 mg IV x 1 1745     2/16  (#2) 99.4 14.2 PD No vancomycin -- Random level @ 0845 = 27.1 mcg/mL    2/17  (#3) 102 20 PD No vancomycin --     2/18  (#4) 102.9 20.4 PD No vancomycin -- Random level @ 0522 = 19.8 mcg/mL    2/19  (#5) 99.9 17.5 PD Vancomycin 1000 mg IV x 1 1042     2/20  (#6) 101.1 17.3 PD No vancomycin --     2/21  (#7) 99.5 9.8 PD No vancomycin -- Random level @0527 = 31.3 mcg/mL    2/22  (#8) 99.4 8.6 PD No vancomycin --     2/23  (#9) 99 8 PD No vancomycin --     2/24  (#10) afebrile 8.6 PD Vancomycin 1000 mg IV x 1 1044 Random level @ 0630 = 17.8 mcg/mL    2/25  (#11) afebrile 9.4 PD No vancomycin --       Estimated Creatinine Clearance: 10 mL/min (A) (based on SCr of 8.1 mg/dL (HH)). UOP over the past 24 hours:       Intake/Output Summary (Last 24 hours) at 2/25/2021 0854  Last data filed at 2/25/2021 0615  Gross per 24 hour   Intake 1352.15 ml   Output    Net 1352.15 ml       Anti-infective Regimen:  Anti-infective Dose Date Initiated Date Stopped   Ceftriaxone 1000 mg IV q24hr 2/15 2/17   Pip/tazo 3.375g IV q12hr 2/17 2/18   Meropenem 500 mg q24hr 2/18      Cultures:  available culture and sensitivity results were reviewed in EPIC  Cultures sent and are pending.   Culture Date Result    Blood cx 1 2/15 No growth   Blood cx 2 2/15 No growth   Respiratory cx 2/18 Serratia marcescens  Candida albicans  Few GP Diplococci Blood cx 1 2/18 No growth   Blood cx 2 2/18 No growth   Wound cx 2/22 No growth   Surgical cx 2/23 NGTD   Anaerobic cx 2/23    Fungal cx 2/23      Assessment:  · Consulted by Dr. Jacklyn Valdez to dose/monitor vancomycin  · Goal trough level:  15-20 mcg/mL, AUC/DEENA:   400-600  · Pt is a 31 yoF who presented from home in PEA arrest. Patient has hx of ESRD on peritoneal dialysis, diabetes, in DKA this admission, and multiple hospitalizations. Empiric antibiotics initiated for sepsis. · Hx ESRD, HD on peritoneal dialysis. · 2/16: Random level = 27.1 mcg/mL  · 2/18: Random level = 19.8 mcg/mL  · 2/21: Random level = 31.3 mcg/mL  · 2/23: Pt underwent I&D for left leg wounds and found to have necrotizing infection. · 2/24: Random level = 17.8 mcg/mL.  ID now consulted    Plan:  · No vancomycin  · Follow dialysis schedule for further dosing and levels  · Pharmacist will follow and monitor/adjust dosing as necessary      Thank you for the consult,    Mik Kelly, PharmD, BCPS 2/25/2021 8:54 AM   Ext: [phone]

## 2021-02-25 NOTE — PROGRESS NOTES
Patient appears somnolent in bed, has difficulty keeping eyes open for long periods of time, and is falling asleep mid-conversation, but still requesting IV pain medication. Dr. Thiago Moser called. Orders to change to [facility] PRN. Okay to give Fentanyl IV now.   Ledy Chacko

## 2021-02-25 NOTE — PROGRESS NOTES
Physical Therapy Treatment Note    Room #:  IC10/IC10-01  Patient Name: Parag Cantrell  YOB: 1990  MRN: [id number]    Referring Provider:   Violet Peña MD     Date of Service: 2021    Evaluating Physical Therapist: Zay Chapa, PT #[id number]       Diagnosis:   Cardiac arrest ([facility] [address] [I46.9]    unresponsiveness.  cardiac arrest    Patient Active Problem List   Diagnosis    High-risk pregnancy    Diabetic ketoacidosis with coma associated with type 1 diabetes mellitus ([address]    Previous stillbirth or demise, antepartum    Non compliance w medication regimen    UTI (urinary tract infection)    Tobacco smoking complicating pregnancy    Drug use complicating pregnancy in third trimester    MTHFR mutation ([address]    Poorly controlled type 1 diabetes mellitus ([address]    Diabetes mellitus type 1, uncontrolled ([address]    Previous  delivery affecting pregnancy, antepartum    Opioid abuse, episodic ([address]    Tobacco use    Sepsis ([address]    Hypoglycemia    Hypertension    Type 1 diabetes mellitus with other specified complication ([address]    Diabetic gastroparesis associated with type 1 diabetes mellitus (HCC)    Iron deficiency anemia    Acute respiratory failure with hypoxia (HCC)    Sinus tachycardia    Bladder dysfunction    Acute heart failure with preserved ejection fraction (HCC)    Chronic kidney disease    Severe protein-calorie malnutrition ([address]    ESRD (end stage renal disease) ([address]    Uncontrolled type 1 diabetes mellitus with hyperglycemia, with long-term current use of insulin ([address]    ESRD on peritoneal dialysis ([address]    Hypothyroidism    Hyperlipidemia    Acute cystitis without hematuria    Nondisplaced fracture of neck of fifth metacarpal bone, left hand, initial encounter for closed fracture    Acute encephalopathy    Effusion of left knee    Acute metabolic encephalopathy    Chronic right-sided low back pain    Endocarditis    Chronic diarrhea  Valvular endocarditis    Hyperosmolar hyperglycemic state (HHS) ([address]    Seizure ([address]    Hypothermia    Hyperkalemia    Diabetic polyneuropathy associated with type 1 diabetes mellitus ([address]    Cardiac arrest ([address]    Shock liver    Bacterial pneumonia        Tentative placement recommendation: Subacute rehab    Equipment recommendation:  To be determined      Prior Level of Function: Patient ambulated independently    Rehab Potential: fair    for baseline    Past medical history:   Past Medical History:   Diagnosis Date    Acute congestive heart failure ([address]     BC (acute kidney injury) ([address] 10/1/2019    Cephalgia 10/9/2019    Chronic kidney disease     Depression     Diabetes mellitus ([address]     Diabetic ketoacidosis ([address] 2011    Hemodialysis patient ([address]     History of blood transfusion 2019    Hyperlipidemia 10/8/2020    Hypothyroidism 10/8/2020    Iron deficiency anemia 10/1/2019    MDRO (multiple drug resistant organisms) resistance     MRSA (methicillin resistant Staphylococcus aureus)     back wound abcess    Non compliance w medication regimen 3/30/2016    Other disorders of kidney and ureter     Pregnancy 3/30/2016    16 weeks    Seizure ([address] 2020    Severe pre-eclampsia in third trimester 2016    Shock liver 2/15/2021     Past Surgical History:   Procedure Laterality Date    BACK SURGERY      abscess     SECTION      x2    CHOLECYSTECTOMY, LAPAROSCOPIC N/A 2019    CHOLECYSTECTOMY LAPAROSCOPIC performed by Lety Moore MD at [facility] N/A 2018    COLONOSCOPY WITH BIOPSY performed by Hung James MD at [facility] N/A 2018    COLONOSCOPY WITH BIOPSY performed by Karlos Blanc MD at [address] [place] [address] ECHO COMPL [address]  2012    EF 57%    ECHO COMPL W DOP COLOR FLOW  6/10/2013         EMBOLECTOMY N/A 2020 Patient educated on role of Physical Therapy, risks of immobility, safety and plan of care,  importance of mobility while in hospital , ankle pumps, quad set and glut set for edema control, blood clot prevention and positioning for skin integrity and comfort      Patient response to education:   Pt verbalized understanding Pt demonstrated skill Pt requires further education in this area   Yes Partial Yes      Treatment:  Patient practiced and was instructed/facilitated in the following treatment: patient transferred to edge of the bed. Pt  :Sat edge of bed 23 minutes with Supervision  to increase dynamic sitting balance and activity tolerance. Pt performed LE & UE exercises. Pt returned to supine. Therapeutic Exercises:  ankle pumps, hip abduction/adduction and long arc quad, elbow, wrist, flex/ext, sup/pro, grasp/relax,   x 15-20 reps. V/C for technique. At end of session, patient in bed with alarm call light and phone within reach,   all lines and tubes intact, nursing notified. Patient would benefit from continued skilled Physical Therapy to improve functional independence and quality of life. Patient's/ family goals   none stated        ASSESSMENT: Patient exhibits decreased strength, balance and endurance impairing functional mobility, transfers, gait , tolerance to activity and participation . Patient able to perform bed mobility supine<>sit independently. Pt feeling better and able to sit with Supervision and maintain mid-line position. All exercises were AROM.   Plan of Care:     -Bed Mobility: Lower extremity exercises  and Upper extremity exercises   -Sitting Balance: Hands on support to maintain midline , Facilitate active trunk muscle engagement  and Facilitate postural control in all planes -Standing Balance: Perform strengthening exercises in standing to promote motor control with or without upper extremity support  and Instruct patient on adequate base of support to maintain balance  -Transfers: Provide instruction on proper hand and foot position for adequate transfer of weight onto lower extremities and use of gait device, Facilitate weight shift forward on to lower extremities and provide necessary stabilization of bilateral lower extremities  and Provide stabilization to prevent fall   -Gait: Gait training and Standing activities to improve: base of support, weight shift, weight bearing    -Endurance: Utilize Supervised activities to increase level of endurance to allow for safe functional mobility including transfers and gait  and Use graduated activities to promote good breathing techniques and provide support and education to maximize respiratory function    Patient and or family understand(s) diagnosis, prognosis, and plan of care. Frequency of treatments: Patient will be seen  daily. Time in 3:06  Time out  3:31    Total Treatment Time  25 minutes  CPT codes:    Therapeutic activities (78561)   15 minutes  1 unit(s)  Therapeutic exercises (17065)   10 minutes  1 unit(s)    Samir Pablo  [facility]  LIC # [id number]

## 2021-02-25 NOTE — PROGRESS NOTES
CCPD tx initiated using strict aseptic technique. Effluent drainage is clear. 4 exchanges of 2 L each with a last fill of 2L -- totaling 10 L of 2.5% dextrose. Dressing changed.

## 2021-02-25 NOTE — PROGRESS NOTES
Comprehensive Nutrition Assessment    Type and Reason for Visit:  Reassess    Nutrition Recommendations/Plan: Continue to diet, Start Glucerna BID    Nutrition Assessment:  Pt remains at nutritional risk d/t h/o ESRD on PD. Pt at further risk 2/2 increased nutrient needs d/t s/p debridement of (L) leg abscess. Tolerating diet w/>75% intakes. Pt admitted w/ Cardiac Arrest, Pt w/ complex medical hx : Type 1 DM and CKD progression. Will modify ONS for add'l protein    Malnutrition Assessment:  Malnutrition Status: At risk for malnutrition (Comment)    Context:  Chronic Illness     Findings of the 6 clinical characteristics of malnutrition:  Energy Intake:  Unable to assess  Weight Loss:  No significant weight loss     Body Fat Loss:  No significant body fat loss     Muscle Mass Loss:  No significant muscle mass loss    Fluid Accumulation:  No significant fluid accumulation     Strength:  Not Performed    Estimated Daily Nutrient Needs:  Energy (kcal):  1900-2000kcal/d(v66-96lkei/kg); Weight Used for Energy Requirements:  Current     Protein (g):  75-85gm pro/d(x1.3-1.5gm/kg); Weight Used for Protein Requirements:  Ideal        Fluid (ml/day):   ; Method Used for Fluid Requirements:  Standard Renal      Nutrition Related Findings:  A/ox4, Hyperglycemia, Renal labs elevated, Phosphorus remains elevated but trending down, +BS, Abd soft w/ PD cath, LLE +2 pitting edema, RLE trace edema, BUE trace edema. Pt receiving 306kcal from dialysate      Wounds:  Surgical Incision       Current Nutrition Therapies:    DIET CARB CONTROL; Carb Control: 4 carb choices (60 gms)/meal  Dietary Nutrition Supplements: Diabetic Oral Supplement    Anthropometric Measures:  · Height: 5' 4\" (162.6 cm)  · Current Body Weight: 161 lb (73 kg)(Continue to use weight 2/19.  # wt fluxs likely r/t fluid status)   · Admission Body Weight: 173 lb (78.5 kg)(2/15 bed scale) · Usual Body Weight: 148 lb (67.1 kg)(12/26/20 bed scale weight. Per other EMR hx wt ranges 133-149# w/ no method)     · Ideal Body Weight: 120 lbs; % Ideal Body Weight 134.2 %   · BMI: 27.6  · Adjusted Body Weight:  ; No Adjustment   · BMI Categories: Overweight (BMI 25.0-29. 9)       Nutrition Diagnosis:   · Increased nutrient needs related to increase demand for energy/nutrients as evidenced by dialysis, wounds      Nutrition Interventions:   Food and/or Nutrient Delivery:  Continue Current Diet, Modify Oral Nutrition Supplement  Nutrition Education/Counseling:  No recommendation at this time   Coordination of Nutrition Care:  Continue to monitor while inpatient    Goals:  Pt to consume >75% most meals/ONS       Nutrition Monitoring and Evaluation:   Behavioral-Environmental Outcomes:  Readiness for Change   Food/Nutrient Intake Outcomes:  Food and Nutrient Intake, Supplement Intake  Physical Signs/Symptoms Outcomes:  Biochemical Data, Fluid Status or Edema     Discharge Planning:     Too soon to determine     Electronically signed by Kalani Ceron RD, LD on 2/25/21 at 10:34 AM EST    Contact: [phone]

## 2021-02-25 NOTE — PROGRESS NOTES
[address] [place] [address] Hospitalist   Progress Note    Admitting Date and Time: 2/15/2021  8:07 AM  Admit Dx: Cardiac arrest [facility]) [I46.9]    Subjective/interval history:    2/16: Patient was admitted yesterday afternoon with PEA cardiac arrest, sepsis due to urinary tract infection, hyperkalemia in setting of end-stage renal disease on peritoneal dialysis. This morning she is intubated, but awake, alert, appears anxious. 2/17: Patient was extubated yesterday afternoon. She is awake, alert, oriented x3, neurologically intact. Very anxious appearing. States she has upper and lower back pain. 2/18: Patient awake, alert, somewhat somnolent, awakens easily to voice, but appears anxious when awake. She has pain all over and has generalized weakness. 2/19: Overnight, patient was seen for altered mental status and hypoxia. She was minimally responsive to sternal rub, but was protecting her airway. She was on nonrebreather mask, now on heated high flow nasal cannula oxygen. ABG did not show any significant hypercapnia. This morning, she is awake, alert, but complaining of pain all over. She is very tearful. I discussed with her that given her respiratory failure, and recent cardiac arrest, escalating pain medications would cause potential complications clean respiratory depression. 2/20: Patient awake, alert, states her pain has improved. Currently on heated high flow nasal cannula with FiO2 down to 70%. 2/21: Patient states she feels overall better today, but still has significant chest pain from chest compressions. Off of heated high flow nasal cannula oxygen, now on 8 L high flow nasal cannula oxygen. 2/22: Patient getting Echo at bedside. States she feels she needs something stronger for pain \"maybe something IV\". 2/23: Patient stated that she is a little scared with the upcoming surgery this afternoon. 2/24: Patient uncomfortable in bed. She is still complaining of 10/10 chest pain and meds not helping.    2/25: Patient states that patch really has not helped her pain. She also wants to be off the renal diet does do carb control she does not follow a renal diet at home.     Per RN: Patient has lost IV access and needs peripheral IV to give her pain meds and antibiotics     insulin glargine  3 Units Subcutaneous BID    insulin lispro  0-6 Units Subcutaneous TID WC    insulin lispro  0-3 Units Subcutaneous Nightly    lidocaine  1 patch Transdermal Daily    sodium chloride  15 mL/kg Intravenous Once    metoprolol tartrate  25 mg Oral BID    Vitamin D  1,000 Units Oral Daily    vitamin D  50,000 Units Oral Weekly    pantoprazole  40 mg Oral QAM AC    Calcium Acetate (Phos Binder)  1,334 mg Oral TID WC    meropenem  500 mg Intravenous Q24H    sodium chloride (PF)  10 mL Intravenous Daily    ipratropium-albuterol  1 ampule Inhalation Q6H    epoetin nena-epbx  3,000 Units Subcutaneous Once per day on Mon Wed Fri    And    epoetin nena-epbx  2,000 Units Subcutaneous Once per day on Mon Wed Fri    levothyroxine  75 mcg Per NG tube QAM AC    sodium chloride flush  10 mL Intravenous 2 times per day    heparin (porcine)  5,000 Units Subcutaneous 3 times per day    vancomycin (VANCOCIN) intermittent dosing (placeholder)   Other RX Placeholder         fentanNYL, 25 mcg, Q2H PRN    Or      fentanNYL, 50 mcg, Q2H PRN      dextrose, 12.5 g, PRN      oxyCODONE-acetaminophen, 1 tablet, Q4H PRN    Or      oxyCODONE-acetaminophen, 2 tablet, Q4H PRN      perflutren lipid microspheres, 1.5 mL, ONCE PRN      sodium chloride, , PRN      glucose, 15 g, PRN      dextrose, 12.5 g, PRN      glucagon (rDNA), 1 mg, PRN      dextrose, 100 mL/hr, PRN      sodium chloride flush, 10 mL, PRN      polyethylene glycol, 17 g, Daily PRN      acetaminophen, 650 mg, Q6H PRN    Or      acetaminophen, 650 mg, Q6H PRN Objective:    BP (!) 117/103   Pulse 113   Temp 98 °F (36.7 °C) (Infrared)   Resp 20   Ht 5' 4\" (1.626 m)   Wt 168 lb 4.8 oz (76.3 kg)   SpO2 94%   BMI 28.89 kg/m²   General Appearance: Alert and oriented x3 but in mod distress from pain.  skin: warm and dry  Head: normocephalic and atraumatic  Eyes: pupils equal, round, and reactive to light, extraocular eye movements intact, conjunctivae normal  Neck: neck supple and non tender without mass   Pulmonary/Chest: Nonlabored. Diminished on the left, clear to auscultation on the right  Cardiovascular: normal rate, normal S1 and S2 and no carotid bruits  Abdomen: soft, non-distended, normal bowel sounds, no masses or organomegaly  Extremities: Left leg with wound vac in place.   Neurologic: Cranial nerves II through XII grossly intact, speech normal      Recent Labs     02/24/21  0230 02/24/21  0630 02/25/21  0519    135 134   K 4.2 4.7 4.3    99 98   CO2 22 22 22   BUN 43* 41* 39*   CREATININE 8.0* 8.0* 8.1*   GLUCOSE 114* 125* 348*   CALCIUM 8.2* 8.4* 9.2       Recent Labs     02/23/21  0520 02/24/21  0630 02/25/21  0519   ALKPHOS 259* 208* 232*   PROT 5.8* 5.4* 5.7*   LABALBU 2.7* 2.3* 2.4*   BILITOT <0.2 <0.2 <0.2   AST 14 18 17   ALT 26 18 12       Recent Labs     02/23/21  0520 02/24/21  0630 02/25/21  0519   WBC 8.0 8.6 9.4   RBC 2.72* 2.64* 2.86*   HGB 8.0* 7.7* 8.2*   HCT 25.4* 24.4* 26.9*   MCV 93.4 92.4 94.1   MCH 29.4 29.2 28.7   MCHC 31.5* 31.6* 30.5*   RDW 16.6* 16.3* 16.2*    420 491*   MPV 10.4 10.1 10.1       Culture, Surgical [[id number]] Collected: 02/23/21 1644     Specimen: Tissue Updated: 02/24/21 0915      Culture Surgical Growth not present, incubation continues     Narrative:       Source: TISSU       Site: Tissue&TissueLeg Left                  Gram Stain [[id number]] Collected: 02/23/21 1644     Specimen: Tissue Updated: 02/24/21 2249      Gram Stain Orderable --    Gram stain performed from swab, interpret results with   caution. Swab specimens of sterile fluids are inferior to   aspirate specimens for organism recovery. Rare Polymorphonuclear leukocytes   Epithelial cells not seen   No organisms seen      Narrative:       Source: TISSU       Site: Tissue&TissueLeg Left        Culture, Wound [[id number]] Collected: 02/22/21 1130     Specimen: Leg Updated: 02/23/21 0940      WOUND/ABSCESS Growth not present, incubation continues     Narrative:       Source: LEG       Site: Left LEG                   Gram stain [[id number]] Collected: 02/22/21 1130     Specimen: Leg Updated: 02/23/21 0742      Gram Stain Orderable --      Gram stain performed from swab, interpret results with   caution. Swab specimens of sterile fluids are inferior to   aspirate specimens for organism recovery. Polymorphonuclear leukocytes not seen   Epithelial cells not seen   No organisms seen      Narrative:       Source: LEG       Site: Left LEG              Culture, Blood 1 [[id number]] Collected: 02/18/21 2152     Specimen: Blood Updated: 02/24/21 0835      Blood Culture, Routine 5 Days no growth     Narrative:       Source: BLOOD       Site: right forearm         Culture, Blood 2 [[id number]] Collected: 02/18/21 2152     Specimen: Blood Updated: 02/24/21 0935      Culture, Blood 2 5 Days no growth     Narrative:       Source: BLOOD       Site: right ac        Radiology:   XR CHEST PORTABLE   Final Result   1. Stable cardiomegaly. 2. Patchy bilateral airspace disease unchanged when compared to the prior   study. XR CHEST PORTABLE   Final Result   Increased consolidation right upper lobe concerning for pneumonia or   developing atelectasis. Diffuse edema/ARDS unchanged. Suspect pulmonary venous hypertension. XR TIBIA FIBULA LEFT (2 VIEWS)   Final Result   No osseous abnormality. No discrete soft tissue abnormality.   Mild diffuse edema      XR CHEST PORTABLE   Final Result No change in bilateral airspace opacities. US DUP LOWER EXTREMITY LEFT RODO   Final Result   No evidence of DVT in the left lower extremity. XR CHEST PORTABLE   Final Result   1. No interval change in the multifocal bilateral diffuse hazy pulmonary   infiltrates. XR CHEST PORTABLE   Final Result   Persistent but perhaps slightly improved bilateral diffuse ill-defined   opacities left slightly greater than right. RECOMMENDATION:   Continued radiographic follow-up suggested. XR CHEST PORTABLE   Final Result   Improvement in right-sided opacities and worsening in left-sided airspace   opacities. Finding may represent pulmonary edema versus atelectasis. XR CHEST PORTABLE   Final Result   Extensive primarily right-sided pulmonary infiltrates new since the prior exam      CT HEAD WO CONTRAST   Final Result   No acute intracranial abnormality. XR ABDOMEN FOR NG/OG/NE TUBE PLACEMENT   Final Result   1. ET tube in satisfactory position. No evidence of acute intrathoracic   disease. 2. NG tube tip in the distal stomach. 3. Nonspecific small bowel distension that could be related to ileus or   small-bowel obstruction. 4. Suspected ascites. XR CHEST PORTABLE   Final Result   1. ET tube in satisfactory position. No evidence of acute intrathoracic   disease. 2. NG tube tip in the distal stomach. 3. Nonspecific small bowel distension that could be related to ileus or   small-bowel obstruction. 4. Suspected ascites.       VL KEY BILATERAL LIMITED 1-2 LEVELS    (Results Pending)      TTE procedure:Echo Complete W/Doppler & Color Flow.       Procedure Date   Date: 02/22/2021 Start: 10:33 AM     Study Location: Portable   Technical Quality: Adequate visualization     Indications:S/P Cardiac Arrest.     Patient Status: Routine     Height: 64 inches Weight: 160 pounds BSA: 1.78 m^2 BMI: 27.46 kg/m^2     BP: 126/77 mmHg      Findings        Left Ventricle  Normal left ventricular chamber size.    Normal left ventricular systolic function, LVEF is 68%.    Moderate left ventricular concentric hypertrophy noted.    Normal diastolic function. Assessment/Plan:  Principal Problem:    Cardiac arrest [facility])  Active Problems:    ESRD on peritoneal dialysis ([facility] [address]    Hypertension    Diabetic ketoacidosis with coma associated with type 1 diabetes mellitus ([facility] [address]    UTI (urinary tract infection)    Diabetes mellitus type 1, uncontrolled ([facility] [address]    Sepsis ([facility] [address]    Acute respiratory failure with hypoxia ([facility] [address]    Shock liver    Bacterial pneumonia  Resolved Problems:    * No resolved hospital problems. *      1. PEA cardiac arrest with successful resuscitation  -Possibly due to arrhythmia from electrolyte abnormalities. She was only slightly hyperkalemic on blood chemistries, however this was after she was given treatment for probable hyperkalemia based on EKG findings  -Monitor electrolytes and correct as necessary  -Treating potential underlying causes including electrolyte abnormalities, DKA, sepsis  - Repeat Echo 9/94 shows systolic and diastolic function wnl.    2.  Acute hypoxic respiratory failure due to bacterial pneumonia  -Antibiotics as noted below  -Improving  -She was on 8 L high flow nasal cannula oxygen but today down to 4L yesterday and 2L today.  -Basal and corrective scale insulin for now. Plan to resume insulin pump at discharge     3. DKA in the setting of uncontrolled type I DM  -resolved. Back on basal/bolus insulin and SSI. 4.  Sepsis due to UTI/right-sided pneumonia  -Continue vancomycin, and meropenem day #10 total of antibiotics. -Blood cultures drawn on 2/15 and repeat cultures on 2/18 showing no growth to date. 5.  Hyperkalemia  -Resolved  -Continue peritoneal dialysis, nephrology following     6. ESRD on peritoneal dialysis  -Nephrology following for dialysis management     8.   Shock liver -Transaminases continue to improve, bilirubin normal. AST 17 and ALT 12    9. Left leg bullae with surrounding erythema c/w necrotizing fascitits  -podiatry evaluated and took for surgery for debridement 2/23 and wound vac was applied  -necrotizing infection was found and extensive debridement was done  - ID consulted and continue same antibiotics for now but suggests switching meropenem to cefepime and levaquin  -wound vac to be changed    10. Essential HTN  -Metoprolol tartrate resumed 2/17 her blood pressure has been well controlled     Code Status: Full code  DVT prophylaxis: Subcutaneous heparin    Case discussed [facility]. NOTE: This report was transcribed using voice recognition software. Every effort was made to ensure accuracy; however, inadvertent computerized transcription errors may be present.      Electronically signed by Sarah Gant MD on 2/25/2021 at 10:14 AM

## 2021-02-25 NOTE — PROGRESS NOTES
Department of Internal Medicine  Nephrology Progress Note    Events reviewed. SUBJECTIVE:  We are following Wilton Flores for ESRD on Peritoneal Dialysis.   Patient seen and examined bedside in the ICU, events reviewed with the ICU RN, she is awaiting transfer out of the ICU PHYSICAL EXAM:      Vitals:    VITALS:  /79   Pulse 112   Temp 98 °F (36.7 °C) (Infrared)   Resp 22   Ht 5' 4\" (1.626 m)   Wt 168 lb 4.8 oz (76.3 kg)   SpO2 95%   BMI 28.89 kg/m²   24HR INTAKE/OUTPUT:      Intake/Output Summary (Last 24 hours) at 2/25/2021 1118  Last data filed at 2/25/2021 0929  Gross per 24 hour   Intake 215 ml   Output 1797 ml   Net -1582 ml     PD Catheter Exam:  PD catheter exit site clean    Constitutional: Awake, alert, oriented x3  HEENT:  Normocephalic, PERRL  Respiratory: Decreased breath sounds on the basis  Cardiovascular/Edema:  RRR, S1/S2  Gastrointestinal:  Soft, PD catheter exit site clean   Neurologic:  Nonfocal, AVELAR  Skin:  Warm, dry, no lesions  Other: No evidence of right lower extremity edema, left leg dressing in place with wound VAC    Scheduled Meds:   insulin glargine  3 Units Subcutaneous BID    insulin lispro  0-6 Units Subcutaneous TID WC    insulin lispro  0-3 Units Subcutaneous Nightly    lidocaine  1 patch Transdermal Daily    sodium chloride  15 mL/kg Intravenous Once    metoprolol tartrate  25 mg Oral BID    Vitamin D  1,000 Units Oral Daily    vitamin D  50,000 Units Oral Weekly    pantoprazole  40 mg Oral QAM AC    Calcium Acetate (Phos Binder)  1,334 mg Oral TID WC    meropenem  500 mg Intravenous Q24H    sodium chloride (PF)  10 mL Intravenous Daily    ipratropium-albuterol  1 ampule Inhalation Q6H    epoetin nena-epbx  3,000 Units Subcutaneous Once per day on Mon Wed Fri    And    epoetin nena-epbx  2,000 Units Subcutaneous Once per day on Mon Wed Fri    levothyroxine  75 mcg Per NG tube QAM AC    sodium chloride flush  10 mL Intravenous 2 times per day  heparin (porcine)  5,000 Units Subcutaneous 3 times per day    vancomycin (VANCOCIN) intermittent dosing (placeholder)   Other RX Placeholder     Continuous Infusions:   sodium chloride      sodium chloride      dextrose Stopped (02/16/21 1400)     PRN Meds:.fentanNYL **OR** fentanNYL, dextrose, oxyCODONE-acetaminophen **OR** oxyCODONE-acetaminophen, perflutren lipid microspheres, sodium chloride, glucose, dextrose, glucagon (rDNA), dextrose, sodium chloride flush, polyethylene glycol, acetaminophen **OR** acetaminophen    DATA:    CBC:   Lab Results   Component Value Date    WBC 9.4 02/25/2021    RBC 2.86 02/25/2021    HGB 8.2 02/25/2021    HCT 26.9 02/25/2021    MCV 94.1 02/25/2021    MCH 28.7 02/25/2021    MCHC 30.5 02/25/2021    RDW 16.2 02/25/2021     02/25/2021    MPV 10.1 02/25/2021     CMP:    Lab Results   Component Value Date     02/25/2021    K 4.3 02/25/2021    K 5.4 02/15/2021    CL 98 02/25/2021    CO2 22 02/25/2021    BUN 39 02/25/2021    CREATININE 8.1 02/25/2021    GFRAA 7 02/25/2021    LABGLOM 7 02/25/2021    GLUCOSE 348 02/25/2021    GLUCOSE 130 05/18/2012    PROT 5.7 02/25/2021    LABALBU 2.4 02/25/2021    LABALBU 4.1 05/18/2012    CALCIUM 9.2 02/25/2021    BILITOT <0.2 02/25/2021    ALKPHOS 232 02/25/2021    AST 17 02/25/2021    ALT 12 02/25/2021     Magnesium:    Lab Results   Component Value Date    MG 1.9 02/25/2021     Phosphorus:    Lab Results   Component Value Date    PHOS 7.4 02/25/2021     Radiology Review:      Chest x-ray February 15, 2021   1. ET tube in satisfactory position.  No evidence of acute intrathoracic   disease. 2. NG tube tip in the distal stomach. 3. Nonspecific small bowel distension that could be related to ileus or   small-bowel obstruction. 4. Suspected ascites.        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  She was admitted to MICU with a diagnosis of DKA and status post cardiac arrest, she was intubated. Patient was extubated earlier today. Problems resolved:    · Respiratory failure, status post intubation, extubated   · Type I DM, DKA, anion gap and bicarbonate levels improved, off insulin drip  · S/p PEA cardiac arrest      IMPRESSION/RECOMMENDATIONS:      1. ESRD on peritoneal dialysis, to continue CCPD Total UF 1903 mL,  drainage clear pale yellow  2. Probably sepsis, on meropenem and vancomycin. WBCs 20.4>>>17.5>>>17.3. .Max temp 101.5, blood cultures no growth 2/18  3. MBD of CKD, calcium acetate 667 g  2 tablets 3 times daily. Phos  9.4>>>10   4. Anemia of CKD, continue epoetin alpha 5000 units 3 times a week. H/H 8.3/24.3  5. Transaminitis improving, trending downward ALT 88, AST 26   6. Malnutrition, protein 5.7, albumin 2.6, Renal supplements BID  7. Vit D deficiency-Results for Janey Stout (MRN [id number]) as of 2/21/2021 16:05   Ref. Range 2/21/2021 05:27   Vit D, 25-Hydroxy Latest Ref Range: 30 - 100 ng/mL 7 (L)       Plan:    · Continue CCPD prescription  2.5% all exchanges, ultrafiltration is close to 1800 mL last night  · Continue ergocalciferol 77108 units weekly  · Continue with calcium acetate 2 tabs 3 times a day with meals for severe hyperphosphatemia  · Continue with Epogen 5000 units subcutaneously 3 times a week  · Okay for PICC line placement for long-term antibiotic   .   Discussed with the ICU RN    Christos Nicolas MD

## 2021-02-25 NOTE — PLAN OF CARE
Problem: Skin Integrity:  Goal: Will show no infection signs and symptoms  Description: Will show no infection signs and symptoms  Outcome: Met This Shift  Goal: Absence of new skin breakdown  Description: Absence of new skin breakdown  Outcome: Met This Shift     Problem: Pain:  Goal: Pain level will decrease  Description: Pain level will decrease  Outcome: Met This Shift  Goal: Control of acute pain  Description: Control of acute pain  Outcome: Met This Shift

## 2021-02-25 NOTE — PROCEDURES
HARRISON Power PICC Left  Placement 2/25/2021    Product number: [id number]   Lot Number: [id number]   Consult: limited access/ICU admit   Ultrasound: yes/VPS   R Brachial      Upper Arm Circumference: 26CM   Size: 5F/50CM   Exposed Length: 4CM   Internal Length: 30CM   Cut: 16CM   Vein Measurement: 0.52CM  Bullseye obtained with VPS, tip of catheter in the lower 1/3 of the SVC at the CAJ, site intact, ports times 2 NS flushed, clamped and alcohol caps applied.  Pt le soha Clemons  2/25/2021  4:38 PM

## 2021-02-25 NOTE — PROGRESS NOTES
Consent signed for PICC placement, and PICC nurse notified. Called placed to Dr. Johnnie Hoffman answering service to obtain clearance for PICC placement. Awaiting callback at this time. Dr. Rhea Car on unit. She is okay for PICC placement from nephrology standpoint.   Chanel Roman

## 2021-02-26 LAB
ALBUMIN SERPL-MCNC: 2.6 G/DL (ref 3.5–5.2)
ALP BLD-CCNC: 210 U/L (ref 35–104)
ALT SERPL-CCNC: 9 U/L (ref 0–32)
ANION GAP SERPL CALCULATED.3IONS-SCNC: 14 MMOL/L (ref 7–16)
AST SERPL-CCNC: 8 U/L (ref 0–31)
BILIRUB SERPL-MCNC: <0.2 MG/DL (ref 0–1.2)
BUN BLDV-MCNC: 36 MG/DL (ref 6–20)
CALCIUM SERPL-MCNC: 8.9 MG/DL (ref 8.6–10.2)
CHLORIDE BLD-SCNC: 101 MMOL/L (ref 98–107)
CO2: 23 MMOL/L (ref 22–29)
CREAT SERPL-MCNC: 8.3 MG/DL (ref 0.5–1)
CULTURE SURGICAL: NORMAL
GFR AFRICAN AMERICAN: 7
GFR NON-AFRICAN AMERICAN: 7 ML/MIN/1.73
GLUCOSE BLD-MCNC: 314 MG/DL (ref 74–99)
HCT VFR BLD CALC: 25.9 % (ref 34–48)
HEMOGLOBIN: 8 G/DL (ref 11.5–15.5)
MAGNESIUM: 2 MG/DL (ref 1.6–2.6)
MCH RBC QN AUTO: 28.9 PG (ref 26–35)
MCHC RBC AUTO-ENTMCNC: 30.9 % (ref 32–34.5)
MCV RBC AUTO: 93.5 FL (ref 80–99.9)
METER GLUCOSE: 141 MG/DL (ref 74–99)
METER GLUCOSE: 154 MG/DL (ref 74–99)
METER GLUCOSE: 210 MG/DL (ref 74–99)
METER GLUCOSE: 310 MG/DL (ref 74–99)
METER GLUCOSE: 68 MG/DL (ref 74–99)
PDW BLD-RTO: 16.5 FL (ref 11.5–15)
PHOSPHORUS: 7.3 MG/DL (ref 2.5–4.5)
PLATELET # BLD: 537 E9/L (ref 130–450)
PMV BLD AUTO: 9.6 FL (ref 7–12)
POTASSIUM SERPL-SCNC: 4 MMOL/L (ref 3.5–5)
RBC # BLD: 2.77 E12/L (ref 3.5–5.5)
SODIUM BLD-SCNC: 138 MMOL/L (ref 132–146)
TOTAL PROTEIN: 5.6 G/DL (ref 6.4–8.3)
WBC # BLD: 11 E9/L (ref 4.5–11.5)

## 2021-02-26 PROCEDURE — 82962 GLUCOSE BLOOD TEST: CPT

## 2021-02-26 PROCEDURE — 90945 DIALYSIS ONE EVALUATION: CPT

## 2021-02-26 PROCEDURE — 6370000000 HC RX 637 (ALT 250 FOR IP): Performed by: STUDENT IN AN ORGANIZED HEALTH CARE EDUCATION/TRAINING PROGRAM

## 2021-02-26 PROCEDURE — 94640 AIRWAY INHALATION TREATMENT: CPT

## 2021-02-26 PROCEDURE — 83735 ASSAY OF MAGNESIUM: CPT

## 2021-02-26 PROCEDURE — 99233 SBSQ HOSP IP/OBS HIGH 50: CPT | Performed by: INTERNAL MEDICINE

## 2021-02-26 PROCEDURE — 85027 COMPLETE CBC AUTOMATED: CPT

## 2021-02-26 PROCEDURE — 6360000002 HC RX W HCPCS: Performed by: STUDENT IN AN ORGANIZED HEALTH CARE EDUCATION/TRAINING PROGRAM

## 2021-02-26 PROCEDURE — 2060000000 HC ICU INTERMEDIATE R&B

## 2021-02-26 PROCEDURE — 2580000003 HC RX 258: Performed by: STUDENT IN AN ORGANIZED HEALTH CARE EDUCATION/TRAINING PROGRAM

## 2021-02-26 PROCEDURE — 2700000000 HC OXYGEN THERAPY PER DAY

## 2021-02-26 PROCEDURE — 2500000003 HC RX 250 WO HCPCS: Performed by: STUDENT IN AN ORGANIZED HEALTH CARE EDUCATION/TRAINING PROGRAM

## 2021-02-26 PROCEDURE — 6370000000 HC RX 637 (ALT 250 FOR IP): Performed by: INTERNAL MEDICINE

## 2021-02-26 PROCEDURE — 97530 THERAPEUTIC ACTIVITIES: CPT | Performed by: PHYSICAL THERAPIST

## 2021-02-26 PROCEDURE — 97110 THERAPEUTIC EXERCISES: CPT

## 2021-02-26 PROCEDURE — 97110 THERAPEUTIC EXERCISES: CPT | Performed by: PHYSICAL THERAPIST

## 2021-02-26 PROCEDURE — 6360000002 HC RX W HCPCS: Performed by: INTERNAL MEDICINE

## 2021-02-26 PROCEDURE — 97606 NEG PRS WND THER DME>50 SQCM: CPT

## 2021-02-26 PROCEDURE — 84100 ASSAY OF PHOSPHORUS: CPT

## 2021-02-26 PROCEDURE — 80053 COMPREHEN METABOLIC PANEL: CPT

## 2021-02-26 RX ADMIN — FENTANYL CITRATE 50 MCG: 50 INJECTION INTRAMUSCULAR; INTRAVENOUS at 13:39

## 2021-02-26 RX ADMIN — Medication 10 ML: at 08:41

## 2021-02-26 RX ADMIN — CALCIUM ACETATE 1334 MG: 667 CAPSULE ORAL at 08:29

## 2021-02-26 RX ADMIN — MEROPENEM 500 MG: 500 INJECTION, POWDER, FOR SOLUTION INTRAVENOUS at 09:58

## 2021-02-26 RX ADMIN — OXYCODONE AND ACETAMINOPHEN 2 TABLET: 5; 325 TABLET ORAL at 02:22

## 2021-02-26 RX ADMIN — INSULIN GLARGINE 3 UNITS: 100 INJECTION, SOLUTION SUBCUTANEOUS at 22:46

## 2021-02-26 RX ADMIN — INSULIN LISPRO 1 UNITS: 100 INJECTION, SOLUTION INTRAVENOUS; SUBCUTANEOUS at 22:46

## 2021-02-26 RX ADMIN — METOPROLOL TARTRATE 25 MG: 25 TABLET, FILM COATED ORAL at 22:45

## 2021-02-26 RX ADMIN — INSULIN GLARGINE 3 UNITS: 100 INJECTION, SOLUTION SUBCUTANEOUS at 08:35

## 2021-02-26 RX ADMIN — HEPARIN 300 UNITS: 100 SYRINGE at 22:45

## 2021-02-26 RX ADMIN — Medication 10 ML: at 23:21

## 2021-02-26 RX ADMIN — LEVOTHYROXINE SODIUM 75 MCG: 75 TABLET ORAL at 05:58

## 2021-02-26 RX ADMIN — EPOETIN ALFA-EPBX 2000 UNITS: 2000 INJECTION, SOLUTION INTRAVENOUS; SUBCUTANEOUS at 08:28

## 2021-02-26 RX ADMIN — INSULIN LISPRO 4 UNITS: 100 INJECTION, SOLUTION INTRAVENOUS; SUBCUTANEOUS at 08:35

## 2021-02-26 RX ADMIN — IPRATROPIUM BROMIDE AND ALBUTEROL SULFATE 1 AMPULE: .5; 3 SOLUTION RESPIRATORY (INHALATION) at 21:43

## 2021-02-26 RX ADMIN — HEPARIN SODIUM 5000 UNITS: 5000 INJECTION INTRAVENOUS; SUBCUTANEOUS at 22:45

## 2021-02-26 RX ADMIN — PANTOPRAZOLE SODIUM 40 MG: 40 TABLET, DELAYED RELEASE ORAL at 05:58

## 2021-02-26 RX ADMIN — IPRATROPIUM BROMIDE AND ALBUTEROL SULFATE 1 AMPULE: .5; 3 SOLUTION RESPIRATORY (INHALATION) at 05:19

## 2021-02-26 RX ADMIN — FENTANYL CITRATE 50 MCG: 50 INJECTION INTRAMUSCULAR; INTRAVENOUS at 09:57

## 2021-02-26 RX ADMIN — HEPARIN 300 UNITS: 100 SYRINGE at 08:40

## 2021-02-26 RX ADMIN — EPOETIN ALFA-EPBX 3000 UNITS: 3000 INJECTION, SOLUTION INTRAVENOUS; SUBCUTANEOUS at 08:29

## 2021-02-26 RX ADMIN — CALCIUM ACETATE 1334 MG: 667 CAPSULE ORAL at 11:59

## 2021-02-26 RX ADMIN — FENTANYL CITRATE 25 MCG: 50 INJECTION INTRAMUSCULAR; INTRAVENOUS at 23:20

## 2021-02-26 RX ADMIN — SODIUM CHLORIDE, PRESERVATIVE FREE 10 ML: 5 INJECTION INTRAVENOUS at 08:41

## 2021-02-26 RX ADMIN — HEPARIN SODIUM 5000 UNITS: 5000 INJECTION INTRAVENOUS; SUBCUTANEOUS at 05:58

## 2021-02-26 RX ADMIN — CALCIUM ACETATE 1334 MG: 667 CAPSULE ORAL at 17:48

## 2021-02-26 RX ADMIN — FENTANYL CITRATE 25 MCG: 50 INJECTION INTRAMUSCULAR; INTRAVENOUS at 18:03

## 2021-02-26 RX ADMIN — INSULIN LISPRO 1 UNITS: 100 INJECTION, SOLUTION INTRAVENOUS; SUBCUTANEOUS at 11:57

## 2021-02-26 RX ADMIN — IPRATROPIUM BROMIDE AND ALBUTEROL SULFATE 1 AMPULE: .5; 3 SOLUTION RESPIRATORY (INHALATION) at 17:33

## 2021-02-26 RX ADMIN — Medication 1000 UNITS: at 08:29

## 2021-02-26 RX ADMIN — IPRATROPIUM BROMIDE AND ALBUTEROL SULFATE 1 AMPULE: .5; 3 SOLUTION RESPIRATORY (INHALATION) at 10:47

## 2021-02-26 RX ADMIN — FENTANYL CITRATE 50 MCG: 50 INJECTION INTRAMUSCULAR; INTRAVENOUS at 03:33

## 2021-02-26 RX ADMIN — METOPROLOL TARTRATE 25 MG: 25 TABLET, FILM COATED ORAL at 08:29

## 2021-02-26 ASSESSMENT — PAIN SCALES - GENERAL
PAINLEVEL_OUTOF10: 8
PAINLEVEL_OUTOF10: 9
PAINLEVEL_OUTOF10: 0
PAINLEVEL_OUTOF10: 8
PAINLEVEL_OUTOF10: 8
PAINLEVEL_OUTOF10: 0

## 2021-02-26 ASSESSMENT — PAIN DESCRIPTION - ORIENTATION: ORIENTATION: MID

## 2021-02-26 ASSESSMENT — PAIN DESCRIPTION - PROGRESSION: CLINICAL_PROGRESSION: GRADUALLY WORSENING

## 2021-02-26 ASSESSMENT — PAIN DESCRIPTION - DESCRIPTORS: DESCRIPTORS: DISCOMFORT;SHARP;SORE

## 2021-02-26 ASSESSMENT — PAIN DESCRIPTION - FREQUENCY: FREQUENCY: CONTINUOUS

## 2021-02-26 ASSESSMENT — PAIN DESCRIPTION - LOCATION: LOCATION: CHEST

## 2021-02-26 NOTE — PROGRESS NOTES
Respiratory cx 2/18 Serratia marcescens  Candida albicans  Few GP Diplococci   Blood cx 1 2/18 No growth   Blood cx 2 2/18 No growth   Wound cx 2/22 No growth   Surgical cx 2/23 No growth   Anaerobic cx 2/23 NGTD   Fungal cx 2/23      Assessment:  · Consulted by Dr. Nathaniel Bowles to dose/monitor vancomycin  · Goal trough level:  15-20 mcg/mL, AUC/DEENA:   400-600  · Pt is a 31 yoF who presented from home in PEA arrest. Patient has hx of ESRD on peritoneal dialysis, diabetes, in DKA this admission, and multiple hospitalizations. Empiric antibiotics initiated for sepsis. · Hx ESRD, HD on peritoneal dialysis. · 2/16: Random level = 27.1 mcg/mL  · 2/18: Random level = 19.8 mcg/mL  · 2/21: Random level = 31.3 mcg/mL  · 2/23: Pt underwent I&D for left leg wounds and found to have necrotizing infection. · 2/24: Random level = 17.8 mcg/mL.  ID now consulted    Plan:  · No vancomycin today  · Random level tomorrow AM. Re-dose if level < 20 mcg/mL  · Follow dialysis schedule for further dosing and levels  · Pharmacist will follow and monitor/adjust dosing as necessary      Thank you for the consult,    Lucretia Tamez, MarianneD, BCPS 2/26/2021 9:02 AM   Ext: [phone]

## 2021-02-26 NOTE — DISCHARGE INSTR - COC
Continuity of Care Form    Patient Name: Tammie Art   :  1990  MRN:  [id number]    Admit date:  2/15/2021  Discharge date:  2021    Code Status Order: Full Code   Advance Directives:   Advance Care Flowsheet Documentation     Date/Time Healthcare Directive Type of Healthcare Directive Copy in [address] Agent's Name Healthcare Agent's Phone Number    02/15/21 8438  Unknown, patient unable to respond due to medical condition -- -- -- -- --          Admitting Physician:  Cherelle Mcconnell DO  PCP: Jeronimo Jackson MD    Discharging Nurse: [facility] Unit/Room#: IC10/IC10-01  Discharging Unit Phone Number: [phone]    Emergency Contact:   Extended Emergency Contact Information  Primary Emergency Contact: [address] [place] [address] Phone: [phone]  Mobile Phone: [phone]  Relation: Parent   needed?  No    Past Surgical History:  Past Surgical History:   Procedure Laterality Date    BACK SURGERY      abscess     SECTION      x2    CHOLECYSTECTOMY, LAPAROSCOPIC N/A 2019    CHOLECYSTECTOMY LAPAROSCOPIC performed by Alondra Lemons MD at [facility] 2018    COLONOSCOPY WITH BIOPSY performed by Abimael Ariza MD at [address] [place] [address] COLONOSCOPY N/A 2018    COLONOSCOPY WITH BIOPSY performed by Eleonora Concepcion MD at [address] [place] [address] ECHO COMPL [address]  2012    EF 57%    ECHO COMPL W DOP COLOR FLOW  6/10/2013         EMBOLECTOMY N/A 2020    ANGIOVAC, VEGECTOMY, YULISSA -- REQS ROOM 3 performed by Kandy Cameron MD at [place] [address] Left 2021    LEFT LEG INCISION AND DRAINAGE, DEBRIDEMENT, WOUND VAC APPLICATION performed by Mode Cruz DPM at [place] [address] N/A 2020    LAPAROSCOPIC INSERTION PERITONEAL DIALYSIS CATHETER performed by Glennda Cogan, MD at [address] [place] [address]  [place] ESOPHAGOGASTRODUODENOSCOPY TRANSORAL DIAGNOSTIC N/A 2018    EGD ESOPHAGOGASTRODUODENOSCOPY performed by Thelma Malagon MD at [address] [facility] TRANSESOPHAGEAL ECHOCARDIOGRAM N/A 10/19/2020    TRANSESOPHAGEAL ECHOCARDIOGRAM WITH BUBBLE STUDY performed by Lobo Carrasco MD at [address] [facility] TUNNELED VENOUS PORT PLACEMENT  2018    UPPER GASTROINTESTINAL ENDOSCOPY  2018    EGD BIOPSY performed by Carmina Barry MD at [facility] N/A 10/11/2019    EGD ESOPHAGOGASTRODUODENOSCOPY performed by Qasim Anthony DO at [facility] ENDOSCOPY       Immunization History:   Immunization History   Administered Date(s) Administered    Influenza Virus Vaccine 10/22/2011, 10/23/2012    Influenza, Radha Camachoata, 6 mo and older, IM, PF (Flulaval, Fluarix) 12/15/2018    Influenza, Radha Kalata, IM, PF (6 mo and older Fluzone, Flulaval, Fluarix, and 3 yrs and older Afluria) 2017, 2017    Tdap (Boostrix, Adacel) 2016, 2016, 2017       Active Problems:  Patient Active Problem List   Diagnosis Code    High-risk pregnancy O09.90    Diabetic ketoacidosis with coma associated with type 1 diabetes mellitus (HCC) E10.11    Previous stillbirth or demise, antepartum O09.299    Non compliance w medication regimen Z91.14    UTI (urinary tract infection) N39.0    Tobacco smoking complicating pregnancy J65.934    Drug use complicating pregnancy in third trimester O99.323    MTHFR mutation ([address] E72.12    Poorly controlled type 1 diabetes mellitus ([address] E10.65    Diabetes mellitus type 1, uncontrolled ([address] E10.65    Previous  delivery affecting pregnancy, antepartum O34.219    Opioid abuse, episodic (HCC) F11.10    Tobacco use Z72.0    Sepsis ([address] A41.9    Hypoglycemia E16.2    Hypertension I10    Type 1 diabetes mellitus with other specified complication ([address] Y12.29  Diabetic gastroparesis associated with type 1 diabetes mellitus ([facility]) E10.43, K31.84    Iron deficiency anemia D50.9    Acute respiratory failure with hypoxia ([facility]) J96.01    Sinus tachycardia R00.0    Bladder dysfunction N31.9    Acute heart failure with preserved ejection fraction ([facility]) I50.31    Chronic kidney disease N18.9    Severe protein-calorie malnutrition ([facility] E43    ESRD (end stage renal disease) ([facility] N18.6    Uncontrolled type 1 diabetes mellitus with hyperglycemia, with long-term current use of insulin ([facility]) E10.65    ESRD on peritoneal dialysis ([facility] N18.6, Z99.2    Hypothyroidism E03.9    Hyperlipidemia E78.5    Acute cystitis without hematuria N30.00    Nondisplaced fracture of neck of fifth metacarpal bone, left hand, initial encounter for closed fracture S62.367A    Acute encephalopathy G93.40    Effusion of left knee M25.462    Acute metabolic encephalopathy V71.74    Chronic right-sided low back pain M54.5, G89.29    Endocarditis I38    Chronic diarrhea K52.9    Valvular endocarditis I38    Hyperosmolar hyperglycemic state (HHS) ([facility] E11.00, E11.65    Seizure ([facility] R56.9    Hypothermia T68. XXXA    Hyperkalemia E87.5    Diabetic polyneuropathy associated with type 1 diabetes mellitus ([facility] E10.42    Cardiac arrest ([facility]) I46.9    Shock liver K72.00    Bacterial pneumonia J15.9       Isolation/Infection:   Isolation          No Isolation        Patient Infection Status     Infection Onset Added Last Indicated Last Indicated By Review Planned Expiration Resolved Resolved By    None active    Resolved    C-diff Rule Out 02/18/21 02/18/21 02/18/21 CLOSTRIDIUM DIFFICILE EIA (Ordered)   02/18/21 Licha Trujillo RN    Order discontinued by RN/physician.     COVID-19 Rule Out 02/15/21 02/15/21 02/15/21 COVID-19 (Ordered)   02/15/21 Rule-Out Test Resulted 02/26/21 164 lb 3.9 oz (74.5 kg)     Mental Status:  oriented and alert    IV Access:  - PICC - site  L Basilic, insertion date: 2/25/2021    Nursing Mobility/ADLs:  Walking   Independent  Transfer  Assisted  Bathing  Assisted  Dressing  Assisted  Toileting  Assisted  Feeding  Assisted  Med Admin  Assisted  Med Delivery   whole    Wound Care Documentation and Therapy:  Cleanse wound with normal saline with wet to dry packing Q12h and prn to left shin        Elimination:  Continence:   · Bowel: No  · Bladder: No  Urinary Catheter: None   Colostomy/Ileostomy/Ileal Conduit: No       Date of Last BM: 2/27/2021    Intake/Output Summary (Last 24 hours) at 2/26/2021 1155  Last data filed at 2/26/2021 0958  Gross per 24 hour   Intake 365 ml   Output 1823 ml   Net -1458 ml     I/O last 3 completed shifts: In: 12 [P.O.:315; IV Piggyback:100]  Out: 1797     Safety Concerns: At Risk for Falls    Impairments/Disabilities:      None    Nutrition Therapy:  Current Nutrition Therapy:   - Oral Diet:  Carb Control 4 carbs/meal (1800kcals/day)    Routes of Feeding: Oral  Liquids: Thin Liquids  Daily Fluid Restriction: no  Last Modified Barium Swallow with Video (Video Swallowing Test): not done    Treatments at the Time of Hospital Discharge:   Respiratory Treatments: duoneb Q6h  Oxygen Therapy:  is not on home oxygen therapy.  2L via nasal cannula  Ventilator:    - No ventilator support    Rehab Therapies: Physical Therapy and Occupational Therapy  Weight Bearing Status/Restrictions: No weight bearing restirctions  Other Medical Equipment (for information only, NOT a DME order):  {EQUIPMENT:151313153}  Other Treatments: paratoneal dialysis catheter with PD at [facility]    Patient's personal belongings (please select all that are sent with patient):  [employer], cell phone     RN SIGNATURE:  Electronically signed by Ernie Coelho RN on 2/27/2021 at 2:20 PM      CASE MANAGEMENT/SOCIAL WORK SECTION    Inpatient Status Date: 2/15/2021 Readmission Risk Assessment Score:  Readmission Risk              Risk of Unplanned Readmission:        73           Discharging to Facility/ Agency   · Name: Phuong Mcnair, [facility]      Dialysis Facility (if applicable)   · Name:  · Address:  · Dialysis Schedule:  · Phone:  · Fax:    / signature: Electronically signed by HANNAH Gotti on 2/26/2021 at 11:57 AM      PHYSICIAN SECTION    Prognosis: {Prognosis:6359344453}    Condition at Discharge: [facility] Patient Condition:[id number]}    Rehab Potential (if transferring to Rehab): {Prognosis:8768579467}    Recommended Labs or Other Treatments After Discharge: ***    Physician Certification: I certify the above information and transfer of [address]  is necessary for the continuing treatment of the diagnosis listed and that she requires LTAC for less 30 days.      Update Admission H&P: {CHP DME Changes in IMUGO:427822586}    PHYSICIAN SIGNATURE:  {Esignature:[id number]}

## 2021-02-26 NOTE — FLOWSHEET NOTE
Inpatient Wound Care    Admit Date: 2/15/2021  8:07 AM    Reason for consult:  LEFT LEG    Significant history:    Past Medical History:   Diagnosis Date    [facility]ive [facility] failure [facility]     BC (acute kidney injury) ([facility] 10/1/2019    Cephalgia 10/9/2019    Chronic kidney disease     Depression     Diabetes mellitus ([facility]     Diabetic ketoacidosis ([facility] 8/27/2011    [facility]od[facility]     History of blood transfusion 11/2019    Hyperlipidemia 10/8/2020    Hypothyroidism 10/8/2020    Iron deficiency anemia 10/1/2019    MDRO (multiple drug resistant organisms) resistance     MRSA (methicillin resistant Staphylococcus aureus)     back wound abcess    Non compliance w medication regimen 3/30/2016    Other disorders of kidney and ureter     Pregnancy 3/30/2016    16 weeks    Seizure ([facility] 11/20/2020    Severe pre-eclampsia in third trimester 8/22/2016    Shock liver 2/15/2021       Wound history:      Findings:       02/26/21 1608   Wound 02/26/21 Left; Anterior   Date First Assessed/Time First Assessed: 02/26/21 1607   Primary Wound Type: Surgical Type  Wound Location Orientation: Left; Anterior   Wound Image    Wound Etiology Surgical   Wound Cleansed Cleansed with saline   Wound Length (cm) 12 cm   Wound Width (cm) 4 cm   Wound Depth (cm) 0.5 cm   Wound Surface Area (cm^2) 48 cm^2   Wound Volume (cm^3) 24 cm^3   Drainage Amount Small   Drainage Description Serosanguinous   Odor None   Negative Pressure Wound Therapy Leg Left   Placement Date: 02/24/21   Pre-existing: No  Inserted by: Dr Lucia Szymanski  Location: Leg  Wound Location Orientation: Left   $ Standard NPWT >50 sq cm PER TX $ Yes   Wound Type Surgical   Unit Type   (KCI)   Dressing Type Black foam   Number of pieces used 2   Cycle Continuous   Target Pressure (mmHg) 125   Dressing Changed Changed/New   Drainage Amount Small   Drainage Description Serosanguinous       Impression:  SURGICAL WOUND    Interventions in place:  WOUND VAC Plan:cont wound vac therapy      Sunshine Ochoa 2/26/2021 4:12 PM

## 2021-02-26 NOTE — PROGRESS NOTES
CCPD complete. Effluent drainage clear pale yellow. Total UF 1823 mls. Stay safe cap applied using aseptic technique.

## 2021-02-26 NOTE — PROGRESS NOTES
Department of Internal Medicine  Nephrology Progress Note    Events reviewed. SUBJECTIVE:  We are following Wilton Flores for ESRD on Peritoneal Dialysis. Reports still being in a lot of pain. Spoke with ICU nurse and stated they are awaiting a bed to transfer her out of ICU.     PHYSICAL EXAM:      Vitals:    VITALS:  BP 92/62   Pulse 101   Temp 98 °F (36.7 °C) (Infrared)   Resp 18   Ht 5' 4\" (1.626 m)   Wt 164 lb 3.9 oz (74.5 kg)   SpO2 93%   BMI 28.19 kg/m²   24HR INTAKE/OUTPUT:      Intake/Output Summary (Last 24 hours) at 2/26/2021 1335  Last data filed at 2/26/2021 1000  Gross per 24 hour   Intake 505 ml   Output 1823 ml   Net -1318 ml     PD Catheter Exam:  PD catheter exit site clean    Constitutional: Awake, alert, oriented x3  HEENT:  Normocephalic, PERRL  Respiratory: Decreased breath sounds on the bases, on 2L NC  Cardiovascular/Edema:  RRR, S1/S2  Gastrointestinal:  Soft, PD catheter exit site clean   Neurologic:  Nonfocal, AVELAR  Skin:  Warm, dry, no lesions  Other: No evidence of right lower extremity edema, left leg dressing in place with wound VAC    Scheduled Meds:   lidocaine PF  5 mL Intradermal Once    heparin flush  3 mL Intravenous 2 times per day    insulin glargine  3 Units Subcutaneous BID    insulin lispro  0-6 Units Subcutaneous TID WC    insulin lispro  0-3 Units Subcutaneous Nightly    lidocaine  1 patch Transdermal Daily    sodium chloride  15 mL/kg Intravenous Once    metoprolol tartrate  25 mg Oral BID    Vitamin D  1,000 Units Oral Daily    vitamin D  50,000 Units Oral Weekly    pantoprazole  40 mg Oral QAM AC    Calcium Acetate (Phos Binder)  1,334 mg Oral TID WC    meropenem  500 mg Intravenous Q24H    sodium chloride (PF)  10 mL Intravenous Daily    ipratropium-albuterol  1 ampule Inhalation Q6H    epoetin nena-epbx  3,000 Units Subcutaneous Once per day on Mon Wed Fri    And    epoetin nena-epbx  2,000 Units Subcutaneous Once per day on Mon Wed Fri  levothyroxine  75 mcg Per NG tube QAM AC    sodium chloride flush  10 mL Intravenous 2 times per day    heparin (porcine)  5,000 Units Subcutaneous 3 times per day    vancomycin (VANCOCIN) intermittent dosing (placeholder)   Other RX Placeholder     Continuous Infusions:   sodium chloride      sodium chloride      dextrose Stopped (02/16/21 1400)     PRN Meds:.sodium chloride flush, heparin flush, fentanNYL **OR** fentanNYL, dextrose, oxyCODONE-acetaminophen **OR** oxyCODONE-acetaminophen, perflutren lipid microspheres, sodium chloride, glucose, dextrose, glucagon (rDNA), dextrose, sodium chloride flush, polyethylene glycol, acetaminophen **OR** acetaminophen    DATA:    CBC:   Lab Results   Component Value Date    WBC 11.0 02/26/2021    RBC 2.77 02/26/2021    HGB 8.0 02/26/2021    HCT 25.9 02/26/2021    MCV 93.5 02/26/2021    MCH 28.9 02/26/2021    MCHC 30.9 02/26/2021    RDW 16.5 02/26/2021     02/26/2021    MPV 9.6 02/26/2021     CMP:    Lab Results   Component Value Date     02/26/2021    K 4.0 02/26/2021    K 5.4 02/15/2021     02/26/2021    CO2 23 02/26/2021    BUN 36 02/26/2021    CREATININE 8.3 02/26/2021    GFRAA 7 02/26/2021    LABGLOM 7 02/26/2021    GLUCOSE 314 02/26/2021    GLUCOSE 130 05/18/2012    PROT 5.6 02/26/2021    LABALBU 2.6 02/26/2021    LABALBU 4.1 05/18/2012    CALCIUM 8.9 02/26/2021    BILITOT <0.2 02/26/2021    ALKPHOS 210 02/26/2021    AST 8 02/26/2021    ALT 9 02/26/2021     Magnesium:    Lab Results   Component Value Date    MG 2.0 02/26/2021     Phosphorus:    Lab Results   Component Value Date    PHOS 7.3 02/26/2021     Radiology Review:      Chest x-ray February 15, 2021   1. ET tube in satisfactory position.  No evidence of acute intrathoracic   disease. 2. NG tube tip in the distal stomach. 3. Nonspecific small bowel distension that could be related to ileus or   small-bowel obstruction. 4. Suspected ascites.        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  She was admitted to MICU with a diagnosis of DKA and status post cardiac arrest, she was intubated. Patient was extubated earlier today. Problems resolved:    · Respiratory failure, status post intubation, extubated   · Type I DM, DKA, anion gap and bicarbonate levels improved, off insulin drip  · S/p PEA cardiac arrest  · Transaminitis improving, trending downward. Resolved      IMPRESSION/RECOMMENDATIONS:      1. ESRD on peritoneal dialysis, to continue CCPD   2. Probably sepsis, on meropenem and vancomycin. WBCs have improved. Max temp 101.5, blood cultures no growth 2/18  3. MBD of CKD, calcium acetate 667 g  2 tablets 3 times daily. Phos 7.3 today  4. Anemia of CKD, continue epoetin alpha 5000 units 3 times a week. 5. Malnutrition, protein 5.7, albumin 2.6, Renal supplements BID  7. Vit D deficiency-Results for Jack Terrell (MRN [id number]) as of 2/21/2021 16:05   Ref. Range 2/21/2021 05:27   Vit D, 25-Hydroxy Latest Ref Range: 30 - 100 ng/mL 7 (L)       Plan:    · Continue CCPD prescription  2.5% all exchanges, ultrafiltration 1800 mL last night  · Continue ergocalciferol 82849 units weekly  · Continue with calcium acetate 2 tabs 3 times a day with meals  · Continue with Epogen 5000 units subcutaneously 3 times a week  · Transfer out of ICU when bed available   .   Discussed with the ICU RN

## 2021-02-26 NOTE — PROGRESS NOTES
CCPD initiated using aseptic technique. First drain complete. Dressing changed. Report given to [facility].

## 2021-02-26 NOTE — PROGRESS NOTES
 History of blood transfusion 11/2019    Hyperlipidemia 10/8/2020    Hypothyroidism 10/8/2020    Iron deficiency anemia 10/1/2019    MDRO (multiple drug resistant organisms) resistance      MRSA (methicillin resistant Staphylococcus aureus)       back wound abcess    Non compliance w medication regimen 3/30/2016    Other disorders of kidney and ureter      Pregnancy 3/30/2016     16 weeks    Seizure ([address] 11/20/2020    Severe pre-eclampsia in third trimester 8/22/2016    Shock liver 2/15/2021         Precautions:  Falls, heated high flow  Pain Scale: Numeric Rate: 7/10 chest pain; Nursing notified. O2 sats 98%; HR 98; RR10   Social history: alone: two children                             Drive: yes                          Occupation: not working   Home architecture: single family home, 1 story, 8 steps to enter with rail, tub shower. PLOF: independent with BADL and independent with IADL, pt ambulated with no device   Equipment owned: none   Cognition: A&O x 4; follows 3 step directions.                good  Problem solving skills              good  Memory               good  Sequencing              good  Judgement/safety  Communication: intact   Visual perceptual skills: intact                Glasses: yes   Edema: yes, ankles     Sensation: intact   Hand Dominance:  Left     X Right       Left Right Comment   Passive range of motion [facility]      Active range of motion [facility]      Muscle Grade 4/5 4/5     /pinch Strength Intact  Intact     - BUE AAROM/PROM exercises: 10-15 reps in all planes of movement to increase ROM/endurance required for functional transfers/ADL participation. Exercises completed in shoulder flexion/extension to ~90* only d/t recent chest compressions), elbow flexion/extension, internal/external rotation, abduction/adduction, supination/pronation, digit and wrist flexion/extension and digit opposition. ROM appears to be [facility] with assist and increased time. Min rest breaks provided 2* to pain/fatigue. Functional Assessment:    Initial Evaluation Status Date:   2/19/2021 Treatment Status  Date:2/21/2021 STGs = LTGs  Time frame: 5 - 14 days   Feeding Independent   N/T Independent    Grooming Minimal Assist with set up to brush teeth/rinse and spit in emesis basin while seated at EOB  Minimal Assist to don deodorant. Minimal Assist to brush hair.  N/T - pt declined combing hair or oral hygiene; max encouragement provided Independent    UB Dressing Moderate Assist to doff and don hospital gown.  N/T Independent    LB Dressing Dependent to don socks  N/T Independent    Bathing Moderate Assist  N/T; encouraged pt to get washed up; pt initially agreed, however d/t pain and fatigue, pt refused Independent    Toileting Dependent for hygiene after using the bed pan   N/T Independent    Bed Mobility  Facilitated Supine to sit: Maximal Assist x 2   Scooting:Maximal Assist  Sit to supine: Maximal Assist x 2    Rolling: Maximal Assist for rolling to remove bed pan and for rolling in bed to straighten linens   N/T - pt refused to sit at EOB d/t pain and fatigue; nsg notified Supine to sit: Independent   Scooting:Independent  Sit to supine: Independent   Rolling: Independent   Functional Transfers Moderate Assist x 2 from EOB with hand held assist x 2. Transfer training with verbal cues for hand placement throughout session to improve safety.    N/T- pt refused OOB activities d/t pain, fatigue Independent Functional Mobility Moderate Assist x 2 with hand held assist to side step towards head of bed. Verbal cues for sequence and safety.  N/T Independent     Activity Tolerance Fair minus    good          Comments: Patient cleared by nursing staff. Upon arrival pt supine in bed with positioning of LLE. Pt agreeable to OT tx session, however limited d/t pain/fatigue. Pt educated with regards to B UE ROM ex's,  ECT's. Ex's completed to 90* only in shoulder flexion d/t recent chest compressions. At end of session pt in L sidelying position  with all lines and tubes intact, call light within reach. Overall, pt demonstrated decreased independence and safety during completion of ADL/functional transfers/mobility tasks. Pt would benefit from continued skilled OT to increase safety and independence with completion of ADL/IADL tasks for functional independence and quality of life. Pt required cues and education as noted above for safe facilitation and completion of tasks. Therapist provided skilled monitoring of patient's response during treatment session. Prior to and at the end of session, environmental modifications /line management completed for patients safety and efficiency of treatment session. Overall, patient demonstrates moderate difficulties with completion of BADLs and IADLs. Factors contributing to these difficulties include chest pain, decreased endurance, and generalized weakness. As noted above, patient likely to benefit from further OT intervention to increase independence, safety, and overall quality of life.      Treatment:     Therapeutic Ex's: To increase B UE strength/ROM/endurance required for functional transfers and ADL participation     · Pt has made fair progress towards set goals      ·   OT 1-3x/week for 5-7 days during hospitalization       Treatment Time In: 4:00 PM    Treatment Time Out: 4:15 PM            Treatment Charges: Mins Units   ADL/Home [facility]     [id number]

## 2021-02-26 NOTE — PROGRESS NOTES
Podiatry Resident Progress Note    SUBJECTIVE:   Patient seen bedside s/p incision and drainage of left leg abscess, excisional debridement to left leg wound to and through level of deep fascia and muscle, and application of wound vac (DOS 2/23/21). Patient is very lethargic today. Able to be awoken with shoulder touch and loud verbal stimuli, but falls back asleep shortly after. She states she is having pain in her left leg around the wound with her dressing changes. Patient states it has not been painful until someone touches the area. Seen today with Dr. Minna Thompson and wound care team present. She denies fevers, chills, nausea, vomiting, shortness of breath, and chest pain. No other lower extremity complaints today. OBJECTIVE:  VITALS:  Vitals:    02/26/21 1400   BP: 88/64   Pulse: 96   Resp: 15   Temp:    SpO2: 99%       LABS:   Recent Labs     02/24/21  0630 02/25/21  0519 02/26/21  0529   WBC 8.6 9.4 11.0   HGB 7.7* 8.2* 8.0*   HCT 24.4* 26.9* 25.9*    491* 537*       PHYSICAL EXAM: Primary dressing left intact today. Wound vac functioning with minimal serosanguinous drainage in canister. Dressing CDI. Able to wiggle digits. Toes warm and well perfused. No pain on compression of posterior calves. Pain with palpation to left leg wound. Moderate edema noted to distal left lower extremity, particularly below level of dressing. Pedal and popliteal pulses faintly palpable. Sensation grossly intact. Small eschar noted bilaterally on proximal tibia, above the level of wound. IMAGING:  Xr Tibia Fibula Left (2 Views)    Result Date: 2/22/2021  No osseous abnormality. No discrete soft tissue abnormality. Mild diffuse edema    Xr Chest Portable    Result Date: 2/24/2021  1. Stable cardiomegaly. 2. Patchy bilateral airspace disease unchanged when compared to the prior study.     Xr Chest Portable    Result Date: 2/23/2021 Increased consolidation right upper lobe concerning for pneumonia or developing atelectasis. Diffuse edema/ARDS unchanged. Suspect pulmonary venous hypertension. Xr Chest Portable    Result Date: 2/21/2021  No change in bilateral airspace opacities. Vl Liz Bilateral Limited 1-2 Levels    Result Date: 2/25/2021  Normal ankle brachial indices. Mildly dampened pulse volume waveforms in the digits, decreased segmental pressure in the left great toe, suspicious for mild-to-moderate peripheral vascular disease. Us Dup Lower Extremity Left Khurram    Result Date: 2/20/2021  No evidence of DVT in the left lower extremity. Micro:   Recent Labs     02/23/21  1644   GRAMSTAINORD Gram stain performed from swab, interpret results with  caution. Swab specimens of sterile fluids are inferior to  aspirate specimens for organism recovery. Rare Polymorphonuclear leukocytes  Epithelial cells not seen  No organisms seen     LABANAE Swab collections are low-yield and rarely indicated. Generally, the specimen volume when collected by swab is  small, reducing the probability of isolating organisms: many  organisms adhere to the fibers of the swab, which reduces the  opportunity of recovering organisms. Anaerobes were not isolated at 48 hours;  incubation continues     CXSURG Growth not present     Path:  Diagnosis:   Skin and subcutaneous tissue, left leg, debridement: Necrosis of skin and   subcutaneous tissue with associated acute inflammation and stromal   hemorrhage. ASSESEMENT:  1. Sp incision and drainage of left leg abscess, excisional debridement to left leg wound to and through level of deep fascia and muscle, and application of wound vac (DOS 2/23/21)  2. Necrotizing soft tissue infection, left leg vs non-infectious etiology? 3. Left leg edema, mildly improved  4. Acute compartment syndrome, left anterior leg?     Principal Problem:    Cardiac arrest [facility])    Active Problems: Diabetic ketoacidosis with coma associated with type 1 diabetes mellitus ([facility] [address]    UTI (urinary tract infection)    Diabetes mellitus type 1, uncontrolled ([facility] [address]    Sepsis ([address]    Hypertension    Acute respiratory failure with hypoxia ([facility] [address]    ESRD on peritoneal dialysis ([facility] [address]    Shock liver    Bacterial pneumonia     PLAN:  -Reevaluateed today with Dr. Vivek Spencer present. Dr. Daniel Brennan also spoke with Dr. Margarita Irene. At this time, L LE is improving with no further obvious signs of infection. Will continue with wound vac therapy / local care at this time. With negative cx, consider other etiology for pretibial necrosis. -Afebrile. WBC 8.4->9.4->11 today. Stain and cx's show no organisms. Path notes necrosis of skin and SubQ with acute inflammation and stromal hemorrhage. -Abx per IM/CC/ID teams. Vanc/meropenem now.    -Dressings: Wound vac, as per ordered.  -Will continue to follow while in hospital.    DW: Adriana Cline DPM FACFAS  Fellowship-Trained Foot and Ankle Surgeon  Diplomate, [employer] Board of Foot and Ankle Surgeons  [phone]

## 2021-02-26 NOTE — PROGRESS NOTES
[address] [place] [address] Hospitalist   Progress Note    Admitting Date and Time: 2/15/2021  8:07 AM  Admit Dx: Cardiac arrest [facility]) [I46.9]    Subjective/interval history:    2/16: Patient was admitted yesterday afternoon with PEA cardiac arrest, sepsis due to urinary tract infection, hyperkalemia in setting of end-stage renal disease on peritoneal dialysis. This morning she is intubated, but awake, alert, appears anxious. 2/17: Patient was extubated yesterday afternoon. She is awake, alert, oriented x3, neurologically intact. Very anxious appearing. States she has upper and lower back pain. 2/18: Patient awake, alert, somewhat somnolent, awakens easily to voice, but appears anxious when awake. She has pain all over and has generalized weakness. 2/19: Overnight, patient was seen for altered mental status and hypoxia. She was minimally responsive to sternal rub, but was protecting her airway. She was on nonrebreather mask, now on heated high flow nasal cannula oxygen. ABG did not show any significant hypercapnia. This morning, she is awake, alert, but complaining of pain all over. She is very tearful. I discussed with her that given her respiratory failure, and recent cardiac arrest, escalating pain medications would cause potential complications clean respiratory depression. 2/20: Patient awake, alert, states her pain has improved. Currently on heated high flow nasal cannula with FiO2 down to 70%. 2/21: Patient states she feels overall better today, but still has significant chest pain from chest compressions. Off of heated high flow nasal cannula oxygen, now on 8 L high flow nasal cannula oxygen. 2/22: Patient getting Echo at bedside. States she feels she needs something stronger for pain \"maybe something IV\". 2/23: Patient stated that she is a little scared with the upcoming surgery this afternoon. 2/24: Patient uncomfortable in bed. She is still complaining of 10/10 chest pain and meds not helping.    2/25: Patient states that patch really has not helped her pain. She also wants to be off the renal diet does do carb control she does not follow a renal diet at home. 2/26: Podiatry at bedside changing wound vac dressing. Patient complaining of pain. Per RN: PICC line placed yesterday. Patient did get precert to [facility] today.      lidocaine PF  5 mL Intradermal Once    heparin flush  3 mL Intravenous 2 times per day    insulin glargine  3 Units Subcutaneous BID    insulin lispro  0-6 Units Subcutaneous TID WC    insulin lispro  0-3 Units Subcutaneous Nightly    lidocaine  1 patch Transdermal Daily    sodium chloride  15 mL/kg Intravenous Once    metoprolol tartrate  25 mg Oral BID    Vitamin D  1,000 Units Oral Daily    vitamin D  50,000 Units Oral Weekly    pantoprazole  40 mg Oral QAM AC    Calcium Acetate (Phos Binder)  1,334 mg Oral TID WC    meropenem  500 mg Intravenous Q24H    sodium chloride (PF)  10 mL Intravenous Daily    ipratropium-albuterol  1 ampule Inhalation Q6H    epoetin nena-epbx  3,000 Units Subcutaneous Once per day on Mon Wed Fri    And    epoetin nena-epbx  2,000 Units Subcutaneous Once per day on Mon Wed Fri    levothyroxine  75 mcg Per NG tube QAM AC    sodium chloride flush  10 mL Intravenous 2 times per day    heparin (porcine)  5,000 Units Subcutaneous 3 times per day    vancomycin (VANCOCIN) intermittent dosing (placeholder)   Other RX Placeholder         sodium chloride flush, 10 mL, PRN      heparin flush, 3 mL, PRN      fentanNYL, 25 mcg, Q3H PRN    Or      fentanNYL, 50 mcg, Q3H PRN      dextrose, 12.5 g, PRN      oxyCODONE-acetaminophen, 1 tablet, Q4H PRN    Or      oxyCODONE-acetaminophen, 2 tablet, Q4H PRN      perflutren lipid microspheres, 1.5 mL, ONCE PRN      sodium chloride, , PRN      glucose, 15 g, PRN      dextrose, 12.5 g, PRN   glucagon (rDNA), 1 mg, PRN      dextrose, 100 mL/hr, PRN      sodium chloride flush, 10 mL, PRN      polyethylene glycol, 17 g, Daily PRN      acetaminophen, 650 mg, Q6H PRN    Or      acetaminophen, 650 mg, Q6H PRN         Objective:    BP 88/64   Pulse 96   Temp 98 °F (36.7 °C) (Infrared)   Resp 15   Ht 5' 4\" (1.626 m)   Wt 164 lb 3.9 oz (74.5 kg)   SpO2 99%   BMI 28.19 kg/m²   General Appearance: Alert and oriented x3 but in mod distress from pain.  skin: warm and dry  Head: normocephalic and atraumatic  Eyes: pupils equal, round, and reactive to light, extraocular eye movements intact, conjunctivae normal  Neck: neck supple and non tender without mass   Pulmonary/Chest: Nonlabored. Diminished on the left, clear to auscultation on the right  Cardiovascular: normal rate, normal S1 and S2 and no carotid bruits  Abdomen: soft, non-distended, normal bowel sounds, no masses or organomegaly  Extremities: Left leg open wound (see photo below)  Neurologic: Cranial nerves II through XII grossly intact, speech normal    **Informed Consent**    The patient has given verbal consent to have photos taken of left leg and inserted into their electronic chart as part of their permanent medical record for purposes of documentation, treatment management and/or medical review. All Images taken on 2/26/21 of patient name: Ashly Harris were transmitted and stored on secured CellScape located within [facility] by a registered [employer] Mobile Application Device.     2/26 Left leg wound after debridement 2/23              Recent Labs     02/24/21  0630 02/25/21  0519 02/26/21  0529    134 138   K 4.7 4.3 4.0   CL 99 98 101   CO2 22 22 23   BUN 41* 39* 36*   CREATININE 8.0* 8.1* 8.3*   GLUCOSE 125* 348* 314*   CALCIUM 8.4* 9.2 8.9       Recent Labs     02/24/21  0630 02/25/21  0519 02/26/21  0529   ALKPHOS 208* 232* 210*   PROT 5.4* 5.7* 5.6*   LABALBU 2.3* 2.4* 2.6*   BILITOT <0.2 <0.2 <0.2 aspirate specimens for organism recovery. Rare Polymorphonuclear leukocytes   Epithelial cells not seen   No organisms seen      Narrative:       Source: TISSU       Site: Tissue&TissueLeg Left        Culture, Wound [[id number]] Collected: 02/22/21 1130     Specimen: Leg Updated: 02/23/21 0940      WOUND/ABSCESS Growth not present, incubation continues     Narrative:       Source: LEG       Site: Left LEG                   Gram stain [[id number]] Collected: 02/22/21 1130     Specimen: Leg Updated: 02/23/21 0742      Gram Stain Orderable --      Gram stain performed from swab, interpret results with   caution. Swab specimens of sterile fluids are inferior to   aspirate specimens for organism recovery. Polymorphonuclear leukocytes not seen   Epithelial cells not seen   No organisms seen      Narrative:       Source: LEG       Site: Left LEG              Culture, Blood 1 [[id number]] Collected: 02/18/21 2152     Specimen: Blood Updated: 02/24/21 0835      Blood Culture, Routine 5 Days no growth     Narrative:       Source: BLOOD       Site: right forearm         Culture, Blood 2 [[id number]] Collected: 02/18/21 2152     Specimen: Blood Updated: 02/24/21 0935      Culture, Blood 2 5 Days no growth     Narrative:       Source: BLOOD       Site: right ac        Radiology:   VL KEY BILATERAL LIMITED 1-2 LEVELS   Final Result   Normal ankle brachial indices. Mildly dampened pulse volume waveforms in the digits, decreased segmental   pressure in the left great toe, suspicious for mild-to-moderate peripheral   vascular disease. XR CHEST PORTABLE   Final Result   1. Stable cardiomegaly. 2. Patchy bilateral airspace disease unchanged when compared to the prior   study. XR CHEST PORTABLE   Final Result   Increased consolidation right upper lobe concerning for pneumonia or   developing atelectasis. Diffuse edema/ARDS unchanged. Suspect pulmonary venous hypertension.       XR TIBIA FIBULA LEFT (2 VIEWS) Final Result   No osseous abnormality. No discrete soft tissue abnormality. Mild diffuse edema      XR CHEST PORTABLE   Final Result   No change in bilateral airspace opacities. US DUP LOWER EXTREMITY LEFT RODO   Final Result   No evidence of DVT in the left lower extremity. XR CHEST PORTABLE   Final Result   1. No interval change in the multifocal bilateral diffuse hazy pulmonary   infiltrates. XR CHEST PORTABLE   Final Result   Persistent but perhaps slightly improved bilateral diffuse ill-defined   opacities left slightly greater than right. RECOMMENDATION:   Continued radiographic follow-up suggested. XR CHEST PORTABLE   Final Result   Improvement in right-sided opacities and worsening in left-sided airspace   opacities. Finding may represent pulmonary edema versus atelectasis. XR CHEST PORTABLE   Final Result   Extensive primarily right-sided pulmonary infiltrates new since the prior exam      CT HEAD WO CONTRAST   Final Result   No acute intracranial abnormality. XR ABDOMEN FOR NG/OG/NE TUBE PLACEMENT   Final Result   1. ET tube in satisfactory position. No evidence of acute intrathoracic   disease. 2. NG tube tip in the distal stomach. 3. Nonspecific small bowel distension that could be related to ileus or   small-bowel obstruction. 4. Suspected ascites. XR CHEST PORTABLE   Final Result   1. ET tube in satisfactory position. No evidence of acute intrathoracic   disease. 2. NG tube tip in the distal stomach. 3. Nonspecific small bowel distension that could be related to ileus or   small-bowel obstruction.    4. Suspected ascites.         TTE procedure:Echo Complete W/Doppler & Color Flow.       Procedure Date   Date: 02/22/2021 Start: 10:33 AM     Study Location: Portable   Technical Quality: Adequate visualization     Indications:S/P Cardiac Arrest.     Patient Status: Routine     Height: 64 inches Weight: 160 pounds BSA: 1.78 m^2 BMI: 27.46 kg/m^2

## 2021-02-26 NOTE — PROGRESS NOTES
[facility]  [facility]  Progress Note    NAME: Jovan Qureshi  MR:  [id number]  :   1990  DATE OF SERVICE:21    This is a face to face encounter with Wilton Flores 31 y.o. female on 21  ID following for   Chief Complaint   Patient presents with    Cardiac Arrest     to er via ems from home. the call was for unresponsiveness. ems state the pt arrested as they arrived here. initial rhythm was PEA     SUBJECTIVE:  LEFT LE COMPLICATED CELLULITIS/SOFT TISSUE INFECTION  S/P I AND D cx ngtd  HAS CHEST PAIN FROM COMPRESION/CARDIAC ARREST   SUPINED  On 4L  Patient is tolerating medications. No reported adverse drug reactions. Review of systems:  As stated above in the chief complaint, otherwise negative.     Medications:  Scheduled Meds:   lidocaine PF  5 mL Intradermal Once    heparin flush  3 mL Intravenous 2 times per day    insulin glargine  3 Units Subcutaneous BID    insulin lispro  0-6 Units Subcutaneous TID WC    insulin lispro  0-3 Units Subcutaneous Nightly    lidocaine  1 patch Transdermal Daily    sodium chloride  15 mL/kg Intravenous Once    metoprolol tartrate  25 mg Oral BID    Vitamin D  1,000 Units Oral Daily    vitamin D  50,000 Units Oral Weekly    pantoprazole  40 mg Oral QAM AC    Calcium Acetate (Phos Binder)  1,334 mg Oral TID WC    meropenem  500 mg Intravenous Q24H    sodium chloride (PF)  10 mL Intravenous Daily    ipratropium-albuterol  1 ampule Inhalation Q6H    epoetin nena-epbx  3,000 Units Subcutaneous Once per day on     And    epoetin nena-epbx  2,000 Units Subcutaneous Once per day on     levothyroxine  75 mcg Per NG tube QAM AC    sodium chloride flush  10 mL Intravenous 2 times per day    heparin (porcine)  5,000 Units Subcutaneous 3 times per day    vancomycin (VANCOCIN) intermittent dosing (placeholder)   Other RX Placeholder     Continuous Infusions:   sodium chloride  sodium chloride      dextrose Stopped (21 1400)     PRN Meds:sodium chloride flush, heparin flush, fentanNYL **OR** fentanNYL, dextrose, oxyCODONE-acetaminophen **OR** oxyCODONE-acetaminophen, perflutren lipid microspheres, sodium chloride, glucose, dextrose, glucagon (rDNA), dextrose, sodium chloride flush, polyethylene glycol, acetaminophen **OR** acetaminophen    OBJECTIVE:  /79   Pulse 106   Temp 97.7 °F (36.5 °C) (Infrared)   Resp 13   Ht 5' 4\" (1.626 m)   Wt 164 lb 3.2 oz (74.5 kg)   SpO2 96%   BMI 28.18 kg/m²   Temp  Av.8 °F (36.6 °C)  Min: 97 °F (36.1 °C)  Max: 98.2 °F (36.8 °C)  Constitutional:  The patient is awake, alert, and oriented. Skin:    Warm and dry. No rashes   HEENT:     AT/NC  Chest:   No use of accessory muscles to breathe. Symmetrical expansion. Cardiovascular:  S1 and S2 are rhythmic and regular. No murmurs appreciated. Abdomen:   Positive bowel sounds to auscultation. Benign to palpation. Extremities:     Edema.  VACC  CNS    AAxO   Lines: pIcc      Radiology:  Laboratory and Tests Review:  Lab Results   Component Value Date    WBC 11.0 2021    WBC 9.4 2021    WBC 8.6 2021    HGB 8.0 (L) 2021    HCT 25.9 (L) 2021    MCV 93.5 2021     (H) 2021     No results found for: [facility]  Lab Results   Component Value Date    ALT 9 2021    AST 8 2021    ALKPHOS 210 (H) 2021    BILITOT <0.2 2021     Lab Results   Component Value Date     2021    K 4.0 2021    K 5.4 02/15/2021     2021    CO2 23 2021    BUN 36 2021    CREATININE 8.3 2021    CREATININE 8.1 2021    CREATININE 8.0 2021    GFRAA 7 2021    LABGLOM 7 2021    GLUCOSE 314 2021    GLUCOSE 130 2012    PROT 5.6 2021    LABALBU 2.6 2021    LABALBU 4.1 2012    CALCIUM 8.9 2021    BILITOT <0.2 2021    ALKPHOS 210 2021 02/18/2021 Rare growth  Final   02/18/2021 Moderate growth  Final     Recent Labs     02/23/21  1644   CXSURG Growth not present        ASSESSMENT/PLAN:     1.  Cellulitis, left leg. 2.  Necrotizing infection, left leg. 3.  Ulceration, left leg with necrosis of the muscle.        PROCEDURES PERFORMED:  1.  Incision and drainage of left leg abscess. 2.  Excisional debridement of left leg wound to and through level of  deep fascia and muscle, total measurement is 7 cm x 4.5 cm x 0.5 cm in  dimension. 3.  Application of wound VAC negative pressure therapy set at 125 mmHg  continuous. S/p cardiac arrest has chest pain   VACC TO BE CHANGED TOMORROW  CKD /on PD       CX NGTD             meropenem (MERREM) 500 mg IVPB (mini-bag), Q24H    vancomycin (VANCOCIN) intermittent dosing (placeholder), RX Placeholder      WILL CONSIDER CONSOLIDATING TO  ORAL ATBX   · Monitor labs    Imaging and labs were reviewed per medical records. The patient was educated about the diagnosis, prognosis, indications, risks and benefits of treatment. An opportunity to ask questions was given to the patient/FAMILY. Thank you for involving me in the care of [address] [place] [address] I will continue to follow. Please do not hesitate to call for any questions or concerns.     Electronically signed by Dylon Lord MD on 2/26/2021 at 8:11 AM

## 2021-02-26 NOTE — PROGRESS NOTES
Mobility Inpatient CMS 0-100% Score: 86.62  Mobility Inpatient CMS G-Code Modifier : CM    Nursing cleared patient for PT treatment. OBJECTIVE;   Initial Evaluation  Date: 2/18/2021 Treatment Date:  2/26/2021     Short Term/ Long Term   Goals   Was pt agreeable to Eval/treatment? Yes   yes To be met in 5 days   Pain level   Unable to rate c/o back pain    9/10    Chest pain    Bed Mobility    Rolling: Moderate assist of 1    Supine to sit: Maximal assist of 1    Sit to supine: Maximal assist of 1    Scooting: Maximal assist of 1   Rolling: Independent   Supine to sit: Independent given increased time  Sit to supine: Not assessed    Scooting: Independent    Rolling: Minimal assist of 1    Supine to sit: Minimal assist of 1    Sit to supine: Minimal assist of 1    Scooting: Minimal assist of 1     Transfers Sit to stand: Not assessed  too lethargic Sit to stand: Supervision      Sit to stand:  Moderate assist of 1     Ambulation    not assessed   4 feet using  no device with Supervision     10 feet using  wheeled walker with Moderate assist of 1    Stair negotiation: ascended and descended   Not assessed            ROM Within functional limits        Strength BUE:  3-/5  RLE:  3-/5  LLE:  3-/5   Increase strength in affected mm groups by 1/3 grade   Balance Sitting EOB:  poor moderate assist throughout right lateral lean maximum cueing for upright  Dynamic Standing:  not assessed   Sitting EOB: fair c/o pain   Dynamic Standing: fair    Sitting EOB:  fair +  Dynamic Standing: fair wheeled walker      Patient is Alert & Oriented x person, place, time and situation and follows directions   Sensation:  Patient  denies numbness and tingling     Edema:  yes bilateral lower extremities left > right; wound vac left  Endurance: poor      Vitals:   2L high flow nasal cannula  Blood Pressure at rest 148/82 Blood Pressure during session 101/74 in chair    Heart Rate at rest 103 Heart Rate during session  110 SPO2 at rest 97% SPO2 during session 95%     Patient education  Patient educated on role of Physical Therapy, risks of immobility, safety and plan of care,  importance of mobility while in hospital , ankle pumps, quad set and glut set for edema control, blood clot prevention and positioning for skin integrity and comfort      Patient response to education:   Pt verbalized understanding Pt demonstrated skill Pt requires further education in this area   Yes Partial Yes      Treatment:  Patient practiced and was instructed/facilitated in the following treatment: patient transferred to edge of the bed. Pt  Performed exercises. :Sat edge of bed 5 minutes with Supervision  to increase dynamic sitting balance and activity tolerance. c/o incresaed pain seated edge of bed assisted up to chair. Therapeutic Exercises:  ankle pumps, quad sets and glut sets,    x 10-15 reps. Requires encouragement for participation  At end of session, patient in chair with   call light and phone within reach,   all lines and tubes intact, nursing-Gonzalez notified. Patient would benefit from continued skilled Physical Therapy to improve functional independence and quality of life. Patient's/ family goals   home        ASSESSMENT: Patient exhibits decreased strength, balance and endurance impairing functional mobility, transfers, gait , tolerance to activity and participation . Patient able to perform bed mobility supine to sit independently when given increased time. Labile throughout, needs max encouragement for participation.  Refuses to eat breakfast.   Plan of Care:     -Bed Mobility: Lower extremity exercises  and Upper extremity exercises   -Sitting Balance: Hands on support to maintain midline , Facilitate active trunk muscle engagement  and Facilitate postural control in all planes

## 2021-02-26 NOTE — CARE COORDINATION
SS Note: Covid negative 2/15/21. Per Celine Jacobson [facility], N-N [phone], PRECERT RECEIVED, VALID 7 DAYS. SW called and updated Dr. Radha Shearer. Per 2/24 CM note, pt had requested CM discuss with pt's mother and mother's preference was Selena Olvera. Ambulance sheet in soft blue chart. Electronic CAITLIN in pt's EPIC Chart for physician signature.   Electronically signed by HANNAH Agarwal on 2/26/2021 at 11:52 AM

## 2021-02-26 NOTE — PROGRESS NOTES
Patient aroused for lunch. Patient asking for iv pain medication in which I explained to her that it was too early for the next dose. I asked patient where she was having pain and she stated it was in her chest and rated it 8/10 with no change from the previous dose of medication.  This patients next dose of iv pain medication will be available @ 1300

## 2021-02-27 VITALS
WEIGHT: 164.24 LBS | BODY MASS INDEX: 28.04 KG/M2 | RESPIRATION RATE: 8 BRPM | DIASTOLIC BLOOD PRESSURE: 73 MMHG | SYSTOLIC BLOOD PRESSURE: 105 MMHG | OXYGEN SATURATION: 98 % | TEMPERATURE: 98.6 F | HEIGHT: 64 IN | HEART RATE: 102 BPM

## 2021-02-27 LAB
ALBUMIN SERPL-MCNC: 2.5 G/DL (ref 3.5–5.2)
ALP BLD-CCNC: 221 U/L (ref 35–104)
ALT SERPL-CCNC: 7 U/L (ref 0–32)
ANAEROBIC CULTURE: NORMAL
ANION GAP SERPL CALCULATED.3IONS-SCNC: 11 MMOL/L (ref 7–16)
AST SERPL-CCNC: 22 U/L (ref 0–31)
BILIRUB SERPL-MCNC: <0.2 MG/DL (ref 0–1.2)
BUN BLDV-MCNC: 38 MG/DL (ref 6–20)
CALCIUM SERPL-MCNC: 9.3 MG/DL (ref 8.6–10.2)
CHLORIDE BLD-SCNC: 99 MMOL/L (ref 98–107)
CO2: 29 MMOL/L (ref 22–29)
CREAT SERPL-MCNC: 8.5 MG/DL (ref 0.5–1)
GFR AFRICAN AMERICAN: 7
GFR NON-AFRICAN AMERICAN: 7 ML/MIN/1.73
GLUCOSE BLD-MCNC: 209 MG/DL (ref 74–99)
HCT VFR BLD CALC: 28 % (ref 34–48)
HEMOGLOBIN: 8.6 G/DL (ref 11.5–15.5)
MAGNESIUM: 2.5 MG/DL (ref 1.6–2.6)
MCH RBC QN AUTO: 29.1 PG (ref 26–35)
MCHC RBC AUTO-ENTMCNC: 30.7 % (ref 32–34.5)
MCV RBC AUTO: 94.6 FL (ref 80–99.9)
METER GLUCOSE: 111 MG/DL (ref 74–99)
METER GLUCOSE: 213 MG/DL (ref 74–99)
PDW BLD-RTO: 16.8 FL (ref 11.5–15)
PHOSPHORUS: 7.5 MG/DL (ref 2.5–4.5)
PLATELET # BLD: 565 E9/L (ref 130–450)
PMV BLD AUTO: 9.6 FL (ref 7–12)
POTASSIUM SERPL-SCNC: 4.3 MMOL/L (ref 3.5–5)
RBC # BLD: 2.96 E12/L (ref 3.5–5.5)
SODIUM BLD-SCNC: 139 MMOL/L (ref 132–146)
TOTAL PROTEIN: 6 G/DL (ref 6.4–8.3)
VANCOMYCIN RANDOM: 27 MCG/ML (ref 5–40)
WBC # BLD: 13.2 E9/L (ref 4.5–11.5)

## 2021-02-27 PROCEDURE — 2700000000 HC OXYGEN THERAPY PER DAY

## 2021-02-27 PROCEDURE — 80202 ASSAY OF VANCOMYCIN: CPT

## 2021-02-27 PROCEDURE — 6370000000 HC RX 637 (ALT 250 FOR IP): Performed by: STUDENT IN AN ORGANIZED HEALTH CARE EDUCATION/TRAINING PROGRAM

## 2021-02-27 PROCEDURE — 6370000000 HC RX 637 (ALT 250 FOR IP): Performed by: INTERNAL MEDICINE

## 2021-02-27 PROCEDURE — 82962 GLUCOSE BLOOD TEST: CPT

## 2021-02-27 PROCEDURE — 84100 ASSAY OF PHOSPHORUS: CPT

## 2021-02-27 PROCEDURE — 6360000002 HC RX W HCPCS: Performed by: STUDENT IN AN ORGANIZED HEALTH CARE EDUCATION/TRAINING PROGRAM

## 2021-02-27 PROCEDURE — 6360000002 HC RX W HCPCS: Performed by: INTERNAL MEDICINE

## 2021-02-27 PROCEDURE — 36592 COLLECT BLOOD FROM PICC: CPT

## 2021-02-27 PROCEDURE — 2500000003 HC RX 250 WO HCPCS: Performed by: STUDENT IN AN ORGANIZED HEALTH CARE EDUCATION/TRAINING PROGRAM

## 2021-02-27 PROCEDURE — 85027 COMPLETE CBC AUTOMATED: CPT

## 2021-02-27 PROCEDURE — 2580000003 HC RX 258: Performed by: STUDENT IN AN ORGANIZED HEALTH CARE EDUCATION/TRAINING PROGRAM

## 2021-02-27 PROCEDURE — 36415 COLL VENOUS BLD VENIPUNCTURE: CPT

## 2021-02-27 PROCEDURE — 94640 AIRWAY INHALATION TREATMENT: CPT

## 2021-02-27 PROCEDURE — 80053 COMPREHEN METABOLIC PANEL: CPT

## 2021-02-27 PROCEDURE — 97606 NEG PRS WND THER DME>50 SQCM: CPT

## 2021-02-27 PROCEDURE — 99239 HOSP IP/OBS DSCHRG MGMT >30: CPT | Performed by: INTERNAL MEDICINE

## 2021-02-27 PROCEDURE — 83735 ASSAY OF MAGNESIUM: CPT

## 2021-02-27 RX ORDER — DOXYCYCLINE HYCLATE 100 MG
100 TABLET ORAL 2 TIMES DAILY
Qty: 10 TABLET | Refills: 0 | DISCHARGE
Start: 2021-02-27 | End: 2021-03-04

## 2021-02-27 RX ORDER — HEPARIN SODIUM 5000 [USP'U]/ML
5000 INJECTION, SOLUTION INTRAVENOUS; SUBCUTANEOUS EVERY 8 HOURS SCHEDULED
DISCHARGE
Start: 2021-02-27 | End: 2021-04-19

## 2021-02-27 RX ORDER — ERGOCALCIFEROL 1.25 MG/1
50000 CAPSULE ORAL WEEKLY
Qty: 5 CAPSULE | DISCHARGE
Start: 2021-02-28 | End: 2021-04-19

## 2021-02-27 RX ORDER — LIDOCAINE 4 G/G
1 PATCH TOPICAL DAILY
DISCHARGE
Start: 2021-02-28 | End: 2021-04-19

## 2021-02-27 RX ORDER — INSULIN GLARGINE 100 [IU]/ML
3 INJECTION, SOLUTION SUBCUTANEOUS 2 TIMES DAILY
Qty: 1 VIAL | Refills: 3 | DISCHARGE
Start: 2021-02-27 | End: 2021-04-19 | Stop reason: SDUPTHER

## 2021-02-27 RX ORDER — PANTOPRAZOLE SODIUM 40 MG/1
40 TABLET, DELAYED RELEASE ORAL
Qty: 30 TABLET | Refills: 3 | DISCHARGE
Start: 2021-02-28 | End: 2021-04-19 | Stop reason: SDUPTHER

## 2021-02-27 RX ORDER — CHOLECALCIFEROL (VITAMIN D3) 25 MCG
1000 TABLET ORAL DAILY
Qty: 60 TABLET | DISCHARGE
Start: 2021-02-28 | End: 2021-04-19

## 2021-02-27 RX ORDER — OXYCODONE HYDROCHLORIDE AND ACETAMINOPHEN 5; 325 MG/1; MG/1
2 TABLET ORAL EVERY 6 HOURS PRN
Qty: 24 TABLET | Refills: 0 | Status: SHIPPED | OUTPATIENT
Start: 2021-02-27 | End: 2021-03-02

## 2021-02-27 RX ORDER — EPOETIN ALFA-EPBX 3000 [IU]/ML
3000 INJECTION, SOLUTION INTRAVENOUS; SUBCUTANEOUS
Qty: 21.9 ML | DISCHARGE
Start: 2021-03-01 | End: 2021-09-28 | Stop reason: ALTCHOICE

## 2021-02-27 RX ORDER — FENTANYL CITRATE 50 UG/ML
50 INJECTION, SOLUTION INTRAMUSCULAR; INTRAVENOUS ONCE
Status: COMPLETED | OUTPATIENT
Start: 2021-02-27 | End: 2021-02-27

## 2021-02-27 RX ORDER — POLYETHYLENE GLYCOL 3350 17 G/17G
17 POWDER, FOR SOLUTION ORAL DAILY PRN
Qty: 527 G | Refills: 1 | DISCHARGE
Start: 2021-02-27 | End: 2021-03-29

## 2021-02-27 RX ORDER — IPRATROPIUM BROMIDE AND ALBUTEROL SULFATE 2.5; .5 MG/3ML; MG/3ML
3 SOLUTION RESPIRATORY (INHALATION) EVERY 6 HOURS
Qty: 360 ML | DISCHARGE
Start: 2021-02-27 | End: 2021-04-19

## 2021-02-27 RX ADMIN — CALCIUM ACETATE 1334 MG: 667 CAPSULE ORAL at 12:30

## 2021-02-27 RX ADMIN — LEVOTHYROXINE SODIUM 75 MCG: 75 TABLET ORAL at 05:29

## 2021-02-27 RX ADMIN — MEROPENEM 500 MG: 500 INJECTION, POWDER, FOR SOLUTION INTRAVENOUS at 09:49

## 2021-02-27 RX ADMIN — FENTANYL CITRATE 25 MCG: 50 INJECTION INTRAMUSCULAR; INTRAVENOUS at 05:30

## 2021-02-27 RX ADMIN — INSULIN LISPRO 2 UNITS: 100 INJECTION, SOLUTION INTRAVENOUS; SUBCUTANEOUS at 09:22

## 2021-02-27 RX ADMIN — IPRATROPIUM BROMIDE AND ALBUTEROL SULFATE 1 AMPULE: .5; 3 SOLUTION RESPIRATORY (INHALATION) at 09:02

## 2021-02-27 RX ADMIN — CALCIUM ACETATE 1334 MG: 667 CAPSULE ORAL at 09:25

## 2021-02-27 RX ADMIN — METOPROLOL TARTRATE 25 MG: 25 TABLET, FILM COATED ORAL at 09:26

## 2021-02-27 RX ADMIN — FENTANYL CITRATE 25 MCG: 50 INJECTION INTRAMUSCULAR; INTRAVENOUS at 02:28

## 2021-02-27 RX ADMIN — PANTOPRAZOLE SODIUM 40 MG: 40 TABLET, DELAYED RELEASE ORAL at 05:29

## 2021-02-27 RX ADMIN — Medication 1000 UNITS: at 09:25

## 2021-02-27 RX ADMIN — IPRATROPIUM BROMIDE AND ALBUTEROL SULFATE 1 AMPULE: .5; 3 SOLUTION RESPIRATORY (INHALATION) at 05:06

## 2021-02-27 RX ADMIN — INSULIN GLARGINE 3 UNITS: 100 INJECTION, SOLUTION SUBCUTANEOUS at 09:50

## 2021-02-27 RX ADMIN — SODIUM CHLORIDE, PRESERVATIVE FREE 10 ML: 5 INJECTION INTRAVENOUS at 09:49

## 2021-02-27 RX ADMIN — HEPARIN SODIUM 5000 UNITS: 5000 INJECTION INTRAVENOUS; SUBCUTANEOUS at 05:29

## 2021-02-27 RX ADMIN — HEPARIN SODIUM 5000 UNITS: 5000 INJECTION INTRAVENOUS; SUBCUTANEOUS at 14:18

## 2021-02-27 RX ADMIN — OXYCODONE AND ACETAMINOPHEN 2 TABLET: 5; 325 TABLET ORAL at 09:26

## 2021-02-27 RX ADMIN — Medication 10 ML: at 09:48

## 2021-02-27 RX ADMIN — HEPARIN 300 UNITS: 100 SYRINGE at 09:49

## 2021-02-27 RX ADMIN — FENTANYL CITRATE 50 MCG: 50 INJECTION, SOLUTION INTRAMUSCULAR; INTRAVENOUS at 14:17

## 2021-02-27 ASSESSMENT — PAIN SCALES - GENERAL
PAINLEVEL_OUTOF10: 8
PAINLEVEL_OUTOF10: 0
PAINLEVEL_OUTOF10: 10

## 2021-02-27 ASSESSMENT — PAIN DESCRIPTION - LOCATION: LOCATION: CHEST

## 2021-02-27 ASSESSMENT — PAIN DESCRIPTION - ORIENTATION
ORIENTATION: MID

## 2021-02-27 ASSESSMENT — PAIN DESCRIPTION - FREQUENCY
FREQUENCY: CONTINUOUS
FREQUENCY: CONTINUOUS

## 2021-02-27 ASSESSMENT — PAIN DESCRIPTION - PAIN TYPE
TYPE: CHRONIC PAIN
TYPE: CHRONIC PAIN

## 2021-02-27 ASSESSMENT — PAIN DESCRIPTION - DESCRIPTORS: DESCRIPTORS: ACHING;CONSTANT;SHARP

## 2021-02-27 NOTE — DISCHARGE SUMMARY
[facility] Physician Discharge Summary       [address] [facility] [address] [place]  [address] [place] [address]  Go on 2/27/2021        Activity level: As tolerated     Diet: DIET CARB CONTROL; Carb Control: 4 carb choices (60 gms)/meal  Dietary Nutrition Supplements: Diabetic Oral Supplement    Labs:As per nephrology to follow HD    Condition at discharge: Stable    Dispo:Reena Conroy    Continue supplemental oxygen via nasal canula @ 2 LPM round-the-clock. Patient ID:  Gerber Oviedo  [id number]  31 y.o.  1990    Admit date: 2/15/2021    Discharge date and time:  2/27/2021  12:41 PM    Admission Diagnoses: Principal Problem:    Cardiac arrest [facility])  Active Problems:    ESRD on peritoneal dialysis ([address]    Hypertension    Diabetic ketoacidosis with coma associated with type 1 diabetes mellitus ([address]    UTI (urinary tract infection)    Diabetes mellitus type 1, uncontrolled ([address]    Sepsis ([address]    Acute respiratory failure with hypoxia ([address]    Shock liver    Bacterial pneumonia  Resolved Problems:    * No resolved hospital problems. *      Discharge Diagnoses: Principal Problem:    Cardiac arrest [facility])  Active Problems:    ESRD on peritoneal dialysis ([address]    Hypertension    Diabetic ketoacidosis with coma associated with type 1 diabetes mellitus ([address]    UTI (urinary tract infection)    Diabetes mellitus type 1, uncontrolled ([address]    Sepsis ([address]    Acute respiratory failure with hypoxia ([address]    Shock liver    Bacterial pneumonia  Resolved Problems:    * No resolved hospital problems.  *      Consults:  IP CONSULT TO CRITICAL CARE  IP CONSULT TO NEPHROLOGY  IP CONSULT TO PHARMACY  IP CONSULT TO PODIATRY  IP CONSULT TO SOCIAL WORK  IP CONSULT TO INFECTIOUS DISEASES    Procedures: Power PICC R brachial 2/25    History of Present Illness:  31 y.o. female with a history of end-stage renal disease on peritoneal dialysis, uncontrolled type 1 diabetes mellitus, uncontrolled hypertension, hypothyroidism presents unresponsive with PEA cardiac arrest. Patient was brought in by squad after being found unresponsive at home. On arrival in the ED, pulse was not detected with the patient had 2 rounds of CPR before achieving ROSC. EKG at that time showed peaked T waves in multiple leads concerning for hyperkalemia. During the code she received 1 dose of epinephrine, 1 amp of bicarbonate, as well as calcium gluconate and insulin. Blood glucose on fingerstick was read as high. BMP that was drawn after the code showed potassium of 5.4, anion gap of 27, severe transaminitis, and urinalysis suggestive of UTI. Hospital Course: 30 yo female admitted as per details. See outline below for additional details and issues addressed this admission:     1. PEA cardiac arrest with successful resuscitation  -Possibly due to arrhythmia from electrolyte abnormalities.  She was only slightly hyperkalemic on blood chemistries, however this was after she was given treatment for probable hyperkalemia based on EKG findings  -Monitor electrolytes and correct as necessary  -Treating potential underlying causes including electrolyte abnormalities, DKA, sepsis  - Repeat Echo 0/16 shows systolic and diastolic function wnl.     2. Acute hypoxic respiratory failure due to bacterial pneumonia (resolved)  -Antibiotics as noted below  -Improving   -She was on 8 L high flow nasal cannula oxygen but yesterday down to 4L yesterday and 2L today.   -Basal and corrective scale insulin for now. Plan to resume insulin pump at discharge     3.  DKA in the setting of uncontrolled type I DM  -resolved. Back on basal/bolus insulin and SSI.     4.  Sepsis due to UTI/right-sided pneumonia  -Continue vancomycin, and meropenem day #12 total of antibiotics. Will stop IV antibiotics and send to [facility] on oral doxycycline. ID to be consulted.   -Blood cultures drawn on 2/15 and repeat cultures on 2/18 showing no growth to date.     5.  Hyperkalemia  -Resolved  -Continue peritoneal dialysis, nephrology following     6.  ESRD on peritoneal dialysis  -Nephrology following for dialysis management. Dr. Michelle Best group do not have privileges there so one of the other nephrology services will need to be consulted. Patient     8.  Shock liver  -Transaminases continue to improve, bilirubin normal. AST 22 and ALT 7     9. Left leg bullae with surrounding erythema c/w necrotizing infection (NF vs pyoderma gangrenosum?)  -podiatry evaluated and took for surgery for debridement 2/23 and wound vac was applied  -necrotizing infection was found and extensive debridement was done  - ID consulted and continue same antibiotics for now but considering consolidating to oral antibiotics in the near future  -wound vac changed  2/26. It will be removed for transfer and wet to dry to be placed instead. Wound vac to be placed Monday. Podiatry to be consulted. Dr Phoebe Salguero does not go there but he has coverage there.      10. Essential HTN  -Metoprolol tartrate resumed 2/17 her blood pressure has been well controlled     11. Disposition  -to LTAC today. Precert obtained 6/73 to [facility]. It is good for 7 days.       Discharge Exam:  Vitals:    02/27/21 1100 02/27/21 1130 02/27/21 1200 02/27/21 1230   BP: 118/76 112/75 99/75 111/72   Pulse: 112 107 110 109   Resp: 11 14 14 14   Temp:   98.6 °F (37 °C)    TempSrc:   Oral    SpO2: 100% 100% 99% 100%   Weight:       Height:         . I/O last 3 completed shifts:   In: 880 [P.O.:780; IV Piggyback:100]  Out: 1823   I/O this shift:  In: -   Out: 1742       LABS:  Recent Labs     02/25/21 0519 02/26/21  0529 02/27/21 0536    138 139   K 4.3 4.0 4.3   CL 98 101 99   CO2 22 23 29   BUN 39* 36* 38*   CREATININE 8.1* 8.3* 8.5*   GLUCOSE 348* 314* 209*   CALCIUM 9.2 8.9 9.3       Recent Labs     02/25/21 0519 02/26/21  0529 02/27/21  0536   WBC 9.4 11.0 13.2*   RBC 2.86* 2.77* 2.96*   HGB 8.2* 8.0* 8.6*   HCT 26.9* 25.9* 28.0*   MCV 94.1 93.5 94.6   MCH 28.7 28.9 29.1   MCHC 30.5* 30.9* 30.7*   RDW 16.2* 16.5* 16.8*   * 537* 565*   MPV 10.1 9.6 9.6           Imaging:        Ct Head Wo Contrast    Result Date: 2/15/2021  EXAMINATION: CT OF THE HEAD WITHOUT CONTRAST  2/15/2021 9:31 am TECHNIQUE: CT of the head was performed without the administration of intravenous contrast. Dose modulation, iterative reconstruction, and/or weight based adjustment of the mA/kV was utilized to reduce the radiation dose to as low as reasonably achievable. COMPARISON: Comparison is dated November 20 08/04/2020 HISTORY: ORDERING SYSTEM PROVIDED HISTORY: seizure, cardiac arrest TECHNOLOGIST PROVIDED HISTORY: Has a \"code stroke\" or \"stroke alert\" been called? ->No Reason for exam:->seizure, cardiac arrest FINDINGS: BRAIN/VENTRICLES: There is no acute intracranial hemorrhage, mass effect or midline shift. No abnormal extra-axial fluid collection. The gray-white differentiation is maintained without evidence of an acute infarct. There is no evidence of hydrocephalus. ORBITS: The visualized portion of the orbits demonstrate no acute abnormality. SINUSES: The visualized paranasal sinuses and mastoid air cells demonstrate no acute abnormality. SOFT TISSUES/SKULL:  No acute abnormality of the visualized skull or soft tissues. No acute intracranial abnormality. Xr Chest Portable    Result Date: 2/15/2021  EXAMINATION: ONE SUPINE XRAY VIEW(S) OF THE ABDOMEN; ONE XRAY VIEW OF THE CHEST 2/15/2021 8:29 am COMPARISON: None. HISTORY: ORDERING SYSTEM PROVIDED HISTORY: Confirmation of course of NG/OG/NE tube and location of tip of tube TECHNOLOGIST PROVIDED HISTORY: Reason for exam:->Confirmation of course of NG/OG/NE tube and location of tip of tube Portable? ->Yes Abdomen film dated 11/05/2019 and portable chest dated 12/26/2020 FINDINGS: ABDOMEN: There is an NG tube with the tip in the distal stomach.   There is mild distention of predominantly small bowel that could be due to ileus or bowel obstruction. Increased density is seen in the lateral aspect of the abdomen bilaterally suggesting the possibility of ascites. Surgical clips are seen in the right upper quadrant. No suspicious intra-abdominal calcifications are identified. The pelvis is excluded from the images. PORTABLE CHEST: There is an ET tube with the tip about 2 cm above the amanda. No acute infiltrate or pleural effusion is seen. The heart and mediastinum are not significantly changed and there is no evidence of vascular congestion. 1. ET tube in satisfactory position. No evidence of acute intrathoracic disease. 2. NG tube tip in the distal stomach. 3. Nonspecific small bowel distension that could be related to ileus or small-bowel obstruction. 4. Suspected ascites. Xr Abdomen For Ng/og/ne Tube Placement    Result Date: 2/15/2021  EXAMINATION: ONE SUPINE XRAY VIEW(S) OF THE ABDOMEN; ONE XRAY VIEW OF THE CHEST 2/15/2021 8:29 am COMPARISON: None. HISTORY: ORDERING SYSTEM PROVIDED HISTORY: Confirmation of course of NG/OG/NE tube and location of tip of tube TECHNOLOGIST PROVIDED HISTORY: Reason for exam:->Confirmation of course of NG/OG/NE tube and location of tip of tube Portable? ->Yes Abdomen film dated 11/05/2019 and portable chest dated 12/26/2020 FINDINGS: ABDOMEN: There is an NG tube with the tip in the distal stomach. There is mild distention of predominantly small bowel that could be due to ileus or bowel obstruction. Increased density is seen in the lateral aspect of the abdomen bilaterally suggesting the possibility of ascites. Surgical clips are seen in the right upper quadrant. No suspicious intra-abdominal calcifications are identified. The pelvis is excluded from the images. PORTABLE CHEST: There is an ET tube with the tip about 2 cm above the amanda. No acute infiltrate or pleural effusion is seen.   The heart and mediastinum are not significantly changed and there is no evidence of vascular congestion. 1. ET tube in satisfactory position. No evidence of acute intrathoracic disease. 2. NG tube tip in the distal stomach. 3. Nonspecific small bowel distension that could be related to ileus or small-bowel obstruction. 4. Suspected ascites. Patient Instructions:   Current Discharge Medication List      START taking these medications    Details   epoetin nena-epbx (RETACRIT) 3000 UNIT/ML SOLN injection Inject 1 mL into the skin three times a week  Qty: 21.9 mL      Heparin Sodium, Porcine, (HEPARIN, PORCINE,) 5000 UNIT/ML injection Inject 1 mL into the skin every 8 hours  Qty:        insulin glargine (LANTUS) 100 UNIT/ML injection vial Inject 3 Units into the skin 2 times daily  Qty: 1 vial, Refills: 3      ipratropium-albuterol (DUONEB) 0.5-2.5 (3) MG/3ML SOLN nebulizer solution Inhale 3 mLs into the lungs every 6 hours  Qty: 360 mL      lidocaine 4 % external patch Place 1 patch onto the skin daily  Qty:        pantoprazole (PROTONIX) 40 MG tablet Take 1 tablet by mouth every morning (before breakfast)  Qty: 30 tablet, Refills: 3      polyethylene glycol (GLYCOLAX) 17 g packet Take 17 g by mouth daily as needed for Constipation  Qty: 527 g, Refills: 1      oxyCODONE-acetaminophen (PERCOCET) 5-325 MG per tablet Take 2 tablets by mouth every 6 hours as needed for Pain for up to 3 days. Qty: 24 tablet, Refills: 0    Comments: Reduce doses taken as pain becomes manageable  Associated Diagnoses: Acute chest wall pain      vitamin D (ERGOCALCIFEROL) 1.25 MG (05383 UT) CAPS capsule Take 1 capsule by mouth once a week  Qty: 5 capsule      Cholecalciferol (VITAMIN D) 25 MCG TABS Take 1 tablet by mouth daily  Qty: 60 tablet    Comments: Labeling may look different. 25 mcg=1000 Units. Please double check dosages.       !! insulin lispro (HUMALOG) 100 UNIT/ML injection vial Inject 0-3 Units into the skin nightly **Corrective Bedtime (50%) Low Dose Algorithm**  Glucose: Dose:               No Insulin  140-249 1 Unit  250-349 2 Units  Over 350 3 Units  Qty: 1 vial, Refills: 3      !! insulin lispro (HUMALOG) 100 UNIT/ML injection vial Inject 0-6 Units into the skin 3 times daily (with meals) **Corrective Low Dose Algorithm**  Glucose: Dose:               No Insulin  140-199 1 Unit  200-249 2 Units  250-299 3 Units  300-349 4 Units  350-399 5 Units  Over 399 6 Units  Qty: 1 vial, Refills: 3      doxycycline hyclate (VIBRA-TABS) 100 MG tablet Take 1 tablet by mouth 2 times daily for 5 days  Qty: 10 tablet, Refills: 0       !! - Potential duplicate medications found. Please discuss with provider. CONTINUE these medications which have NOT CHANGED    Details   Insulin Pump - insulin aspart (patient supplied) Inject into the skin continuous Insulin-to-Carb Ratio (ICR): **  Insulin Sensitivity Factor (ISF): ** mg/dL per unit of insulin  Target Blood Glucose: ** mg/dL  Bolus Frequency: **      rOPINIRole (REQUIP) 0.25 MG tablet Take 0.25 mg by mouth nightly      metoprolol tartrate (LOPRESSOR) 25 MG tablet Take 25 mg by mouth 2 times daily      Calcium Acetate, Phos Binder, 667 MG CAPS Take 1 capsule by mouth 3 times daily (with meals)  Qty: 90 capsule, Refills: 0      metoclopramide (REGLAN) 5 MG tablet Take 5 mg by mouth 3 times daily       levothyroxine (SYNTHROID) 75 MCG tablet Take 1 tablet by mouth every morning (before breakfast)  Qty: 30 tablet, Refills: 3      atorvastatin (LIPITOR) 40 MG tablet Take 1 tablet by mouth nightly  Qty: 30 tablet, Refills: 5    Associated Diagnoses: Hyperlipidemia, unspecified hyperlipidemia type      blood glucose test strips (CONTOUR NEXT TEST) strip 1 each by In Vitro route 5 times daily As needed. Qty: 150 each, Refills: 5    Associated Diagnoses: Type 1 diabetes mellitus with other specified complication ([address]  Insulin pump in place         STOP taking these medications       gabapentin (NEURONTIN) 100 MG capsule Comments:   Reason for Stopping:         losartan (COZAAR) 50 MG tablet Comments:   Reason for Stopping:         bumetanide (BUMEX) 2 MG tablet Comments:   Reason for Stopping:         dilTIAZem (CARDIZEM CD) 240 MG extended release capsule Comments:   Reason for Stopping:         mirtazapine (REMERON) 15 MG tablet Comments:   Reason for Stopping:         metOLazone (ZAROXOLYN) 5 MG tablet Comments:   Reason for Stopping:         cloNIDine (CATAPRES) 0.1 MG tablet Comments:   Reason for Stopping:             Case discussed with Dr. Alvaro Garcia of podiatry in patient's room. Note that more than 30 minutes was spent in preparing discharge papers, discussing discharge with patient, medication review, etc.    NOTE: This report was transcribed using voice recognition software. Every effort was made to ensure accuracy; however, inadvertent computerized transcription errors may be present.      Signed:  Electronically signed by Carline Sampson MD on 2/27/2021 at 12:41 PM

## 2021-02-27 NOTE — PLAN OF CARE
Problem: Restraint Use - Nonviolent/Non-Self-Destructive Behavior:  Goal: Absence of restraint indications  Description: Absence of restraint indications  Outcome: Completed  Goal: Absence of restraint-related injury  Description: Absence of restraint-related injury  Outcome: Completed     Problem: Skin Integrity:  Goal: Will show no infection signs and symptoms  Description: Will show no infection signs and symptoms  Outcome: Completed  Goal: Absence of new skin breakdown  Description: Absence of new skin breakdown  Outcome: Completed     Problem: Breathing Pattern - Ineffective:  Goal: Ability to achieve and maintain a regular respiratory rate will improve  Description: Ability to achieve and maintain a regular respiratory rate will improve  Outcome: Completed     Problem: Pain:  Goal: Pain level will decrease  Description: Pain level will decrease  Outcome: Completed  Goal: Control of acute pain  Description: Control of acute pain  Outcome: Completed  Goal: Control of chronic pain  Description: Control of chronic pain  2/27/2021 1438 by Jonathon Jarrell RN  Outcome: Completed  2/27/2021 0556 by Eugenia Smith RN  Outcome: Met This Shift

## 2021-02-27 NOTE — PLAN OF CARE
Problem: Pain:  Goal: Control of chronic pain  Description: Control of chronic pain  Outcome: Met This Shift

## 2021-02-27 NOTE — PROGRESS NOTES
[facility]  [facility]  Progress Note    NAME: Erick Lance  MR:  [id number]  :   1990  DATE OF SERVICE:21    This is a face to face encounter with Wilton Flores 31 y.o. female on 21  ID following for   Chief Complaint   Patient presents with    Cardiac Arrest     to er via ems from home. the call was for unresponsiveness. ems state the pt arrested as they arrived here. initial rhythm was PEA     SUBJECTIVE:  LEFT LE COMPLICATED CELLULITIS/SOFT TISSUE INFECTION  S/P I AND D cx ngtd  HAS CHEST PAIN FROM COMPRESION/CARDIAC ARREST   SUPINED  On 4L  Patient is tolerating medications. No reported adverse drug reactions. Review of systems:  As stated above in the chief complaint, otherwise negative.     Medications:  Scheduled Meds:   lidocaine PF  5 mL Intradermal Once    heparin flush  3 mL Intravenous 2 times per day    insulin glargine  3 Units Subcutaneous BID    insulin lispro  0-6 Units Subcutaneous TID WC    insulin lispro  0-3 Units Subcutaneous Nightly    lidocaine  1 patch Transdermal Daily    sodium chloride  15 mL/kg Intravenous Once    metoprolol tartrate  25 mg Oral BID    Vitamin D  1,000 Units Oral Daily    vitamin D  50,000 Units Oral Weekly    pantoprazole  40 mg Oral QAM AC    Calcium Acetate (Phos Binder)  1,334 mg Oral TID WC    meropenem  500 mg Intravenous Q24H    sodium chloride (PF)  10 mL Intravenous Daily    ipratropium-albuterol  1 ampule Inhalation Q6H    epoetin nena-epbx  3,000 Units Subcutaneous Once per day on     And    epoetin nena-epbx  2,000 Units Subcutaneous Once per day on     levothyroxine  75 mcg Per NG tube QAM AC    sodium chloride flush  10 mL Intravenous 2 times per day    heparin (porcine)  5,000 Units Subcutaneous 3 times per day    vancomycin (VANCOCIN) intermittent dosing (placeholder)   Other RX Placeholder     Continuous Infusions:   sodium chloride  sodium chloride      dextrose Stopped (21 1400)     PRN Meds:sodium chloride flush, heparin flush, fentanNYL **OR** [DISCONTINUED] fentanNYL, dextrose, oxyCODONE-acetaminophen **OR** oxyCODONE-acetaminophen, perflutren lipid microspheres, sodium chloride, glucose, dextrose, glucagon (rDNA), dextrose, sodium chloride flush, polyethylene glycol, acetaminophen **OR** acetaminophen    OBJECTIVE:  /73   Pulse 102   Temp 98.6 °F (37 °C) (Oral)   Resp 8   Ht 5' 4\" (1.626 m)   Wt 164 lb 3.9 oz (74.5 kg)   SpO2 98%   BMI 28.19 kg/m²   Temp  Av.8 °F (36.6 °C)  Min: 96.9 °F (36.1 °C)  Max: 98.7 °F (37.1 °C)  Constitutional:  The patient is awake, alert, and oriented. Skin:    Warm and dry. No rashes   HEENT:     AT/NC  Chest:   No use of accessory muscles to breathe. Symmetrical expansion. Cardiovascular:  S1 and S2 are rhythmic and regular. No murmurs appreciated. Abdomen:   Positive bowel sounds to auscultation. Benign to palpation. Extremities:     Edema.  VACC  CNS    AAxO   Lines: pIcc      Radiology:  Laboratory and Tests Review:  Lab Results   Component Value Date    WBC 13.2 (H) 2021    WBC 11.0 2021    WBC 9.4 2021    HGB 8.6 (L) 2021    HCT 28.0 (L) 2021    MCV 94.6 2021     (H) 2021     No results found for: [facility]  Lab Results   Component Value Date    ALT 7 2021    AST 22 2021    ALKPHOS 221 (H) 2021    BILITOT <0.2 2021     Lab Results   Component Value Date     2021    K 4.3 2021    K 5.4 02/15/2021    CL 99 2021    CO2 29 2021    BUN 38 2021    CREATININE 8.5 2021    CREATININE 8.3 2021    CREATININE 8.1 2021    GFRAA 7 2021    LABGLOM 7 2021    GLUCOSE 209 2021    GLUCOSE 130 2012    PROT 6.0 2021    LABALBU 2.5 2021    LABALBU 4.1 2012    CALCIUM 9.3 2021    BILITOT <0.2 2021 ALKPHOS 221 02/27/2021    AST 22 02/27/2021    ALT 7 02/27/2021     Lab Results   Component Value Date    CRP 12.8 (H) 02/22/2021    CRP 2.5 (H) 10/31/2020    CRP 43.1 (H) 11/01/2019     Lab Results   Component Value Date    SEDRATE 95 (H) 02/22/2021    SEDRATE 35 (H) 10/31/2020    SEDRATE 19 10/13/2020     Recent Labs     02/25/21  0519 02/26/21  0529 02/27/21  0536   AST 17 8 22   ALT 12 9 7     Lab Results   Component Value Date    CHOL 95 01/14/2020    TRIG 82 01/14/2020    HDL 33 01/14/2020    LDLCALC 46 01/14/2020    LABVLDL 16 01/14/2020     Lab Results   Component Value Date/Time    VITD25 7 (L) 02/21/2021 05:27 AM       Microbiology:   No results for input(s): COVID19 in the last 72 hours. Lab Results   Component Value Date    BC 5 Days no growth 02/18/2021    BC 5 Days no growth 02/15/2021    BC 5 Days no growth 12/26/2020    BLOODCULT2 5 Days no growth 02/18/2021    BLOODCULT2 5 Days no growth 02/15/2021    BLOODCULT2 5 Days no growth 12/26/2020    ORG Serratia marcescens 02/18/2021    ORG Nadine albicans 02/18/2021    ORG Staphylococcus epidermidis 10/08/2020     WOUND/ABSCESS   Date Value Ref Range Status   02/22/2021 Growth not present  Final   12/11/2014 (A)  Final    Mixed yulisa also isolated, including:  Alpha hemolytic Strep species  Corynebacteria     12/11/2014 Heavy growth  Final     Smear, Respiratory   Date Value Ref Range Status   02/18/2021   Final    Group 6: <25 PMN's/LPF and <25 Epithelial cells/LPF  Rare Polymorphonuclear leukocytes  Rare Epithelial cells  Few Gram positive diplococci  Rare Gram negative rods           Component Value Date/Time    FUNGSM No Fungal elements seen 11/06/2020 1142    LABFUNG No growth after 4 weeks of incubation.  11/06/2020 1142       MRSA Culture Only   Date Value Ref Range Status   12/27/2020 Methicillin resistant Staph aureus not isolated  Final     CULTURE, RESPIRATORY   Date Value Ref Range Status 02/18/2021 Oral Pharyngeal Celine reduced (A)  Final   02/18/2021 Rare growth  Final   02/18/2021 Moderate growth  Final     No results for input(s): CXSURG, ORG in the last 72 hours. ASSESSMENT/PLAN:     1.  Cellulitis, left leg. 2.  Necrotizing infection, left leg. 3.  Ulceration, left leg with necrosis of the muscle.        PROCEDURES PERFORMED:  1.  Incision and drainage of left leg abscess. 2.  Excisional debridement of left leg wound to and through level of  deep fascia and muscle, total measurement is 7 cm x 4.5 cm x 0.5 cm in  dimension. 3.  Application of wound VAC negative pressure therapy set at 125 mmHg  continuous. S/p cardiac arrest has chest pain   VACC TO BE CHANGED TOMORROW  CKD /on PD       CX NGTD     · Okay to discharge patient to [address] [place] [address] and labs were reviewed per medical records.      Electronically signed by Cass Clarke MD on 2/27/2021 at 4:50 PM         Cass Clarke MD, [address]  2/27/2021  4:50 PM

## 2021-02-27 NOTE — PROGRESS NOTES
Foot and Ankle surgery progress note    SUBJECTIVE:   Patient seen bedside along with Dr. Morgan Crawford s/p incision and drainage of left leg abscess, excisional debridement to left leg wound to and through level of deep fascia and muscle, and application of wound vac (DOS 2/23/21). Patient doing well. No complaints to LE at this time but does have chest pain 2/2 to CPR. No overnight events however. OBJECTIVE:  VITALS:  Vitals:    02/27/21 1130   BP: 112/75   Pulse: 107   Resp: 14   Temp:    SpO2: 100%       LABS:   Recent Labs     02/25/21  0519 02/26/21  0529 02/27/21  0536   WBC 9.4 11.0 13.2*   HGB 8.2* 8.0* 8.6*   HCT 26.9* 25.9* 28.0*   * 537* 565*       PHYSICAL EXAM: NV unchanged. Dressing and wound VAC in place. No proximal streaking, erythema, lymphedema or lymphangitis noted. No pain on compression to posterior calf or anterior thigh. IMAGING:  Xr Tibia Fibula Left (2 Views)    Result Date: 2/22/2021  No osseous abnormality. No discrete soft tissue abnormality. Mild diffuse edema    Xr Chest Portable    Result Date: 2/24/2021  1. Stable cardiomegaly. 2. Patchy bilateral airspace disease unchanged when compared to the prior study. Xr Chest Portable    Result Date: 2/23/2021  Increased consolidation right upper lobe concerning for pneumonia or developing atelectasis. Diffuse edema/ARDS unchanged. Suspect pulmonary venous hypertension. Xr Chest Portable    Result Date: 2/21/2021  No change in bilateral airspace opacities. Vl Liz Bilateral Limited 1-2 Levels    Result Date: 2/25/2021  Normal ankle brachial indices. Mildly dampened pulse volume waveforms in the digits, decreased segmental pressure in the left great toe, suspicious for mild-to-moderate peripheral vascular disease. Us Dup Lower Extremity Left Khurram    Result Date: 2/20/2021  No evidence of DVT in the left lower extremity.       Micro: No results for input(s): BC, GRAMSTAINORD, WNDABS, LABANAE, CXSURG in the last 72 hours. Invalid input(s): LAB8  Path:  Diagnosis:   Skin and subcutaneous tissue, left leg, debridement: Necrosis of skin and   subcutaneous tissue with associated acute inflammation and stromal   hemorrhage. ASSESEMENT:  1. Sp incision and drainage of left leg abscess, excisional debridement to left leg wound to and through level of deep fascia and muscle, and application of wound vac (DOS 2/23/21)  2. Necrotizing soft tissue infection  3. Left leg edema, mildly improved  4. Principal Problem:    Cardiac arrest ([address]    Active Problems:    Diabetic ketoacidosis with coma associated with type 1 diabetes mellitus ([address]    UTI (urinary tract infection)    Diabetes mellitus type 1, uncontrolled ([address]    Sepsis ([address]    Hypertension    Acute respiratory failure with hypoxia ([address]    ESRD on peritoneal dialysis ([address]    Shock liver    Bacterial pneumonia     PLAN:  Discussed case with Dr. Eleonora Hargrove and patient as well. No further plans for immediate intervention from my point of view. DC planning may resume (Possible DC to [facility])  Wound VAC MWF at 125 mmHg continuous  Abx per ID. Discussed with Dr. Laila Milton.     Julio Garces DPM FACFAS  Fellowship-Trained Foot and Ankle Surgeon  Diplomate, American Board of Foot and Ankle Surgeons  [phone]

## 2021-02-27 NOTE — PROGRESS NOTES
Output 1742 ml   Net -1202 ml       Anti-infective Regimen:  Anti-infective Dose Date Initiated Date Stopped   Ceftriaxone 1000 mg IV q24hr 2/15 2/17   Pip/tazo 3.375g IV q12hr 2/17 2/18   Meropenem 500 mg q24hr 2/18      Cultures:  available culture and sensitivity results were reviewed in EPIC  Cultures sent and are pending. Culture Date Result    Blood cx 1 2/15 No growth   Blood cx 2 2/15 No growth   Respiratory cx 2/18 Serratia marcescens  Candida albicans  Few GP Diplococci   Blood cx 1 2/18 No growth   Blood cx 2 2/18 No growth   Wound cx 2/22 No growth   Surgical cx 2/23 No growth   Anaerobic cx 2/23 NGTD     Assessment:  · Consulted by Dr. Raymond Young to dose/monitor vancomycin  · Goal trough level:  15-20 mcg/mL, AUC/DEENA:   400-600  · Pt is a 31 yoF who presented from home in PEA arrest. Patient has hx of ESRD on peritoneal dialysis, diabetes, in DKA this admission, and multiple hospitalizations. Empiric antibiotics initiated for sepsis. · Hx ESRD, HD on peritoneal dialysis. · 2/16: Random level = 27.1 mcg/mL  · 2/18: Random level = 19.8 mcg/mL  · 2/21: Random level = 31.3 mcg/mL  · 2/23: Pt underwent I&D for left leg wounds and found to have necrotizing infection. · 2/24: Random level = 17.8 mcg/mL.  ID now consulted    Plan:  · No vancomycin today  · Follow dialysis schedule for further dosing and levels  · Pharmacist will follow and monitor/adjust dosing as necessary      Thank you for the consult,    Shanel Hawkins, PharmD Candidate 2023 2/27/2021 11:52 AM

## 2021-02-27 NOTE — CARE COORDINATION
SS Note/Discharge plan:  Contacted [facility] admissions liaison for Phuong Xu confirming pt's admission for [facility] for 3:30pm , pt's mother, Best notified of transfer arrangements, nursing informed.  Electronically signed by HANNAH Gordon on 2/27/2021 at 1:36 PM

## 2021-02-27 NOTE — FLOWSHEET NOTE
02/27/21 0800   Vitals   BP (!) 126/91   Pulse 109   Resp 12   SpO2 100 %   Peritoneal Dialysis Catheter Left lower abdomen   Placement Date/Time: 10/31/20 0704   Catheter Location: Left lower abdomen   Status Deaccessed   Site Condition No Complications   Dressing Status Clean;Dry; Intact   Dressing Gauze   Peritoneal Dialysis (CAPD manual)   Effluent Appearance Clear;Yellow   Cycler   Ultrafiltration (UF) (mL) 1742 mL   CCPD completed with out complications. PD cath de-accessed using aseptic technique. Net fluid removal  1742 ml, clear yellow effluent noted. Remains in care of staff.

## 2021-02-28 LAB — ANAEROBIC CULTURE: NORMAL

## 2021-03-10 ENCOUNTER — TELEPHONE (OUTPATIENT)
Dept: ENDOCRINOLOGY | Age: 31
End: 2021-03-10

## 2021-03-10 NOTE — TELEPHONE ENCOUNTER
[facility] called regarding the pt's mental state. She's currently in their care for at least the next two weeks. She's off of her insulin pump currently. She stopped taking care of herself, and ended up missing dialysis. This landed her in the hospital with hyperkalemia. She currently sleeps off an on all day and cries. She will not take her blood sugar, and they have to force her to take her insulin. I made her an appt with you on 3/31. They will try to get her to test before that appt, so that you can adjust insulin as needed.

## 2021-03-15 RX ORDER — LEVOTHYROXINE SODIUM 0.07 MG/1
TABLET ORAL
Qty: 90 TABLET | Refills: 1 | Status: SHIPPED | OUTPATIENT
Start: 2021-03-15 | End: 2021-04-19 | Stop reason: SDUPTHER

## 2021-04-19 ENCOUNTER — OFFICE VISIT (OUTPATIENT)
Dept: INTERNAL MEDICINE | Age: 31
End: 2021-04-19
Payer: COMMERCIAL

## 2021-04-19 VITALS
TEMPERATURE: 98.3 F | WEIGHT: 146 LBS | BODY MASS INDEX: 24.92 KG/M2 | HEART RATE: 103 BPM | SYSTOLIC BLOOD PRESSURE: 109 MMHG | RESPIRATION RATE: 16 BRPM | HEIGHT: 64 IN | OXYGEN SATURATION: 99 % | DIASTOLIC BLOOD PRESSURE: 80 MMHG

## 2021-04-19 DIAGNOSIS — E87.5 HYPERKALEMIA: ICD-10-CM

## 2021-04-19 DIAGNOSIS — F11.10 OPIOID ABUSE (HCC): ICD-10-CM

## 2021-04-19 DIAGNOSIS — Z99.2 ESRD ON PERITONEAL DIALYSIS (HCC): ICD-10-CM

## 2021-04-19 DIAGNOSIS — E03.9 HYPOTHYROIDISM, UNSPECIFIED TYPE: ICD-10-CM

## 2021-04-19 DIAGNOSIS — D50.9 IRON DEFICIENCY ANEMIA, UNSPECIFIED IRON DEFICIENCY ANEMIA TYPE: Chronic | ICD-10-CM

## 2021-04-19 DIAGNOSIS — E10.65 UNCONTROLLED TYPE 1 DIABETES MELLITUS WITH HYPERGLYCEMIA, WITH LONG-TERM CURRENT USE OF INSULIN (HCC): Primary | ICD-10-CM

## 2021-04-19 DIAGNOSIS — R56.9 SEIZURE (HCC): ICD-10-CM

## 2021-04-19 DIAGNOSIS — N18.6 ESRD ON PERITONEAL DIALYSIS (HCC): ICD-10-CM

## 2021-04-19 DIAGNOSIS — E78.5 HYPERLIPIDEMIA, UNSPECIFIED HYPERLIPIDEMIA TYPE: ICD-10-CM

## 2021-04-19 PROBLEM — N30.00 ACUTE CYSTITIS WITHOUT HEMATURIA: Status: RESOLVED | Noted: 2020-10-10 | Resolved: 2021-04-19

## 2021-04-19 PROBLEM — Z72.0 TOBACCO USE: Status: RESOLVED | Noted: 2017-09-27 | Resolved: 2021-04-19

## 2021-04-19 PROBLEM — J15.9 BACTERIAL PNEUMONIA: Status: RESOLVED | Noted: 2021-02-19 | Resolved: 2021-04-19

## 2021-04-19 PROBLEM — A41.9 SEPSIS (HCC): Status: RESOLVED | Noted: 2018-04-15 | Resolved: 2021-04-19

## 2021-04-19 PROBLEM — K72.00 SHOCK LIVER: Status: RESOLVED | Noted: 2021-02-15 | Resolved: 2021-04-19

## 2021-04-19 PROBLEM — T68.XXXA HYPOTHERMIA: Status: RESOLVED | Noted: 2020-11-24 | Resolved: 2021-04-19

## 2021-04-19 PROBLEM — K31.84 DIABETIC GASTROPARESIS ASSOCIATED WITH TYPE 1 DIABETES MELLITUS (HCC): Status: RESOLVED | Noted: 2018-12-17 | Resolved: 2021-04-19

## 2021-04-19 PROBLEM — I46.9 CARDIAC ARREST (HCC): Status: RESOLVED | Noted: 2021-02-15 | Resolved: 2021-04-19

## 2021-04-19 PROBLEM — E16.2 HYPOGLYCEMIA: Status: RESOLVED | Noted: 2018-04-30 | Resolved: 2021-04-19

## 2021-04-19 PROBLEM — J96.01 ACUTE RESPIRATORY FAILURE WITH HYPOXIA (HCC): Status: RESOLVED | Noted: 2019-10-29 | Resolved: 2021-04-19

## 2021-04-19 PROBLEM — E10.42 DIABETIC POLYNEUROPATHY ASSOCIATED WITH TYPE 1 DIABETES MELLITUS (HCC): Status: RESOLVED | Noted: 2020-12-27 | Resolved: 2021-04-19

## 2021-04-19 PROBLEM — E10.69 TYPE 1 DIABETES MELLITUS WITH OTHER SPECIFIED COMPLICATION (HCC): Status: RESOLVED | Noted: 2018-07-19 | Resolved: 2021-04-19

## 2021-04-19 PROBLEM — M25.462 EFFUSION OF LEFT KNEE: Status: RESOLVED | Noted: 2020-10-14 | Resolved: 2021-04-19

## 2021-04-19 PROBLEM — I38 VALVULAR ENDOCARDITIS: Status: RESOLVED | Noted: 2020-11-10 | Resolved: 2021-04-19

## 2021-04-19 PROBLEM — K52.9 CHRONIC DIARRHEA: Status: RESOLVED | Noted: 2020-11-01 | Resolved: 2021-04-19

## 2021-04-19 PROBLEM — E10.43 DIABETIC GASTROPARESIS ASSOCIATED WITH TYPE 1 DIABETES MELLITUS (HCC): Status: RESOLVED | Noted: 2018-12-17 | Resolved: 2021-04-19

## 2021-04-19 PROBLEM — E11.00 HYPEROSMOLAR HYPERGLYCEMIC STATE (HHS) (HCC): Status: RESOLVED | Noted: 2020-11-20 | Resolved: 2021-04-19

## 2021-04-19 PROBLEM — O34.219 PREVIOUS CESAREAN DELIVERY AFFECTING PREGNANCY, ANTEPARTUM: Status: RESOLVED | Noted: 2017-03-02 | Resolved: 2021-04-19

## 2021-04-19 PROCEDURE — 99213 OFFICE O/P EST LOW 20 MIN: CPT | Performed by: INTERNAL MEDICINE

## 2021-04-19 PROCEDURE — 3046F HEMOGLOBIN A1C LEVEL >9.0%: CPT | Performed by: INTERNAL MEDICINE

## 2021-04-19 PROCEDURE — 4004F PT TOBACCO SCREEN RCVD TLK: CPT | Performed by: INTERNAL MEDICINE

## 2021-04-19 PROCEDURE — G8427 DOCREV CUR MEDS BY ELIG CLIN: HCPCS | Performed by: INTERNAL MEDICINE

## 2021-04-19 PROCEDURE — 99212 OFFICE O/P EST SF 10 MIN: CPT | Performed by: INTERNAL MEDICINE

## 2021-04-19 PROCEDURE — 2022F DILAT RTA XM EVC RTNOPTHY: CPT | Performed by: INTERNAL MEDICINE

## 2021-04-19 PROCEDURE — G8419 CALC BMI OUT NRM PARAM NOF/U: HCPCS | Performed by: INTERNAL MEDICINE

## 2021-04-19 RX ORDER — HYDROXYZINE HYDROCHLORIDE 25 MG/1
25 TABLET, FILM COATED ORAL DAILY
COMMUNITY
End: 2021-09-28 | Stop reason: ALTCHOICE

## 2021-04-19 RX ORDER — TRAMADOL HYDROCHLORIDE 50 MG/1
50 TABLET ORAL EVERY 8 HOURS PRN
COMMUNITY
Start: 2021-04-15 | End: 2021-04-19

## 2021-04-19 RX ORDER — MIRTAZAPINE 15 MG/1
15 TABLET, FILM COATED ORAL NIGHTLY
Status: ON HOLD | COMMUNITY
End: 2022-04-25 | Stop reason: HOSPADM

## 2021-04-19 RX ORDER — DARBEPOETIN ALFA 60 UG/.3ML
60 INJECTION, SOLUTION INTRAVENOUS; SUBCUTANEOUS
Status: ON HOLD | COMMUNITY
End: 2021-05-20 | Stop reason: HOSPADM

## 2021-04-19 RX ORDER — DOCUSATE SODIUM 100 MG/1
100 CAPSULE, LIQUID FILLED ORAL 2 TIMES DAILY
Status: ON HOLD | COMMUNITY
End: 2021-07-15 | Stop reason: HOSPADM

## 2021-04-19 RX ORDER — PANTOPRAZOLE SODIUM 40 MG/1
40 TABLET, DELAYED RELEASE ORAL
Qty: 30 TABLET | Refills: 3 | Status: SHIPPED
Start: 2021-04-19 | End: 2021-09-28 | Stop reason: ALTCHOICE

## 2021-04-19 RX ORDER — HYDRALAZINE HYDROCHLORIDE 10 MG/1
10 TABLET, FILM COATED ORAL 2 TIMES DAILY
Qty: 60 TABLET | Refills: 1 | Status: ON HOLD
Start: 2021-04-19 | End: 2021-07-15 | Stop reason: HOSPADM

## 2021-04-19 RX ORDER — LEVOTHYROXINE SODIUM 0.07 MG/1
TABLET ORAL
Qty: 60 TABLET | Refills: 0 | Status: ON HOLD
Start: 2021-04-19 | End: 2022-04-15 | Stop reason: HOSPADM

## 2021-04-19 RX ORDER — ALBUTEROL SULFATE 2.5 MG/3ML
2.5 SOLUTION RESPIRATORY (INHALATION) EVERY 6 HOURS PRN
Status: ON HOLD | COMMUNITY
End: 2021-08-26

## 2021-04-19 RX ORDER — INSULIN GLARGINE 100 [IU]/ML
3 INJECTION, SOLUTION SUBCUTANEOUS 2 TIMES DAILY
Qty: 1 VIAL | Refills: 3 | Status: SHIPPED
Start: 2021-04-19 | End: 2021-06-01

## 2021-04-19 RX ORDER — SEVELAMER CARBONATE 800 MG/1
TABLET, FILM COATED ORAL
Status: ON HOLD | COMMUNITY
Start: 2021-04-18 | End: 2021-07-02 | Stop reason: HOSPADM

## 2021-04-19 RX ORDER — ARIPIPRAZOLE 5 MG/1
5 TABLET ORAL NIGHTLY
COMMUNITY
Start: 2021-04-18

## 2021-04-19 RX ORDER — POLYETHYLENE GLYCOL 3350 17 G/17G
17 POWDER, FOR SOLUTION ORAL DAILY PRN
Status: ON HOLD | COMMUNITY
End: 2022-03-03 | Stop reason: HOSPADM

## 2021-04-19 RX ORDER — HYDRALAZINE HYDROCHLORIDE 10 MG/1
10 TABLET, FILM COATED ORAL 2 TIMES DAILY
COMMUNITY
Start: 2021-04-18 | End: 2021-04-19 | Stop reason: SDUPTHER

## 2021-04-19 RX ORDER — NICOTINE POLACRILEX 4 MG
15 LOZENGE BUCCAL PRN
Status: ON HOLD | COMMUNITY
End: 2021-10-03 | Stop reason: HOSPADM

## 2021-04-19 ASSESSMENT — ENCOUNTER SYMPTOMS
COUGH: 0
BLOOD IN STOOL: 0
ABDOMINAL PAIN: 0
NAUSEA: 0
VOMITING: 0
SHORTNESS OF BREATH: 1
DIARRHEA: 0

## 2021-04-19 NOTE — PATIENT INSTRUCTIONS
Internal medicine    Follow ups   Please follow up with the internal medicine clinic at [facility] appointment has been scheduled for 5/5/2021 at 10:30AM     Changes in healthcare    Please take all medications as indicated   Diet: Diabetic - please avoid extra sugar in food or drink. Please avoid white rice, pasta, or bread    Activity: activity as tolerated     Additional labs, testing or imaging needed after discharge - please have your lab work drawn before your next visit   o CBC  o BMP  o Lipid Panel     Please contact us if you have any concerns, wish to change or make an appointment:  [facility]    Phone: [phone]   Fax: [phone] [address] [address]   Or please call the nurse line at [phone].  Should you have further questions in regards to this visit, you can review your clinical note and after visit summary document on your [employer] account. Other questions can be directed to our nurse line at [phone].  Other than any new prescriptions given to you today, the list of home medications on this After Visit Summary are based on information provided to us from you and your healthcare providers. This information, including the list, dose, and frequency of medications is only as accurate as the information you provided. If you have any questions or concerns about your home medications, please contact your Primary Care Physician for further clarification. · Please continue taking your insulin as you were instructed in the hospital.   · Lantus twice daily   · Low dose sliding scale:   Glucose:    - No Insulin  140-199 - 1 Unit  200-249 - 2 Units  250-299 - 3 Units  300-349 - 4 Units  350-399 - 5 Units  Over 399 - 6 Units     · Please measure your blood sugar at least four times a day, before every meal and at bedtime.    · Please go the the emergency department immediately if your blood sugar is above 250 and you are feeling lightheaded, dizzy, confused, tired, or have abdominal pain, nausea, or vomiting. These could be signs of a very serious condition. Patient Education        Diabetic Ketoacidosis (DKA): Care Instructions  Your Care Instructions  Diabetic ketoacidosis (DKA) happens when the body does not have enough insulin and can't get the sugar it needs for energy. When the body can't use sugar for energy, it starts to use fat for energy. This process makes fatty acids called ketones. The ketones build up in the blood and change the chemical balance in your body. This problem can be very dangerous and needs to be treated. Without treatment, it can lead to a coma or death. DKA occurs most often in people with type 1 diabetes. But people with type 2 diabetes also can get it. DKA can be caused by many things. It can happen if you don't take enough insulin. It can also happen if you have an infection or illness like the flu. Sometimes it happens if you are very dehydrated. DKA can only be treated with insulin and fluids. These are often given in a vein (IV). Follow-up care is a key part of your treatment and safety. Be sure to make and go to all appointments, and call your doctor if you are having problems. It's also a good idea to know your test results and keep a list of the medicines you take. How can you care for yourself at home? To reduce your chance of ketoacidosis:  · Take your insulin and other diabetes medicines on time and in the right dose. ? If an infection caused your DKA and your doctor prescribed antibiotics, take them as directed. Do not stop taking them just because you feel better. You need to take the full course of antibiotics. · Test your blood sugar before meals and at bedtime or as often as your doctor advises. This is the best way to know when your blood sugar is high so you can treat it early. Watching for symptoms is not as helpful.  This is because you may not have symptoms until your blood sugar is very high. Or you may not notice them. · Teach others at work and at home how to check your blood sugar. Make sure that someone else knows how do it in case you can't. · Wear or carry medical identification at all times. This is very important in case you are too sick or injured to speak for yourself. · Talk to your doctor about when you can start to exercise again. · Eat regular meals that spread your calories and carbohydrate throughout the day. This will help keep your blood sugar steady. · When you are sick:  ? Take your insulin and diabetes medicines. This is important even if you are vomiting and having trouble eating or drinking. Your blood sugar may go up because you are sick. If you are eating less than normal, you may need to change your dose of insulin. Talk with your doctor about a plan when you are well. Then you will know what to do when you are sick. ? Drink extra fluids to prevent dehydration. These include water, broth, and sugar-free drinks. If you don't drink enough, the insulin from your shot may not get into your blood. So your blood sugar may go up. ? Try to eat as you normally do, with a focus on healthy food choices. ? Check your blood sugar at least every 3 to 4 hours. Check it more often if it's rising fast. If your doctor has told you to take an extra insulin dose for high blood sugar levels (for example, above 240 mg/dL) be sure to take the right amount. If you're not sure how much to take, call your doctor. ? Check your temperature and pulse often. If your temperature goes up, call your doctor. You may be getting worse. ? If you take insulin, check your urine or blood for ketones, especially when you have high blood sugar (for example, above 240 mg/dL). Call your doctor if your ketone level is moderate or high. If you know your blood sugar is high, treat it before it gets worse.   · If you missed your usual dose of insulin or other diabetes medicine, take the missed dose or take the amount your doctor told you to take if this happens. · If you and your doctor decide on a dose of extra-fast-acting insulin, give yourself the right dose. If you take insulin and your doctor has not told you how much fast-acting insulin to take based on your blood sugar level, call your doctor. · Drink extra water or sugar-free drinks to prevent dehydration. · Wait 30 minutes after you take extra insulin or missed medicines. Then check your blood sugar again. · If symptoms of high blood sugar get worse or your blood sugar level keeps rising, call your doctor. If you start to feel sleepy or confused, call 911. When should you call for help? Call 911 anytime you think you may need emergency care. For example, call if:    · You passed out (lost consciousness).     · You are confused or cannot think clearly.     · Your blood sugar is very high or very low. Watch closely for changes in your health, and be sure to contact your doctor if:    · Your blood sugar stays outside the level your doctor set for you.     · You have any problems. Where can you learn more? Go to https://Slide.[employer]. org and sign in to your [employer] account. Enter X058 in the [employer] box to learn more about \"Diabetic Ketoacidosis (DKA): Care Instructions. \"     If you do not have an account, please click on the \"Sign Up Now\" link. Current as of: August 31, 2020               Content Version: 12.8  © 2006-2021 [employer], [facility]. Care instructions adapted under license by [facility] ([facility]). If you have questions about a medical condition or this instruction, always ask your healthcare professional. [place] [address] any warranty or liability for your use of this information.

## 2021-05-14 ENCOUNTER — HOSPITAL ENCOUNTER (INPATIENT)
Age: 31
LOS: 5 days | Discharge: HOME HEALTH | DRG: 951 | End: 2021-05-20
Attending: EMERGENCY MEDICINE | Admitting: INTERNAL MEDICINE
Payer: COMMERCIAL

## 2021-05-14 ENCOUNTER — APPOINTMENT (OUTPATIENT)
Dept: GENERAL RADIOLOGY | Age: 31
DRG: 951 | End: 2021-05-14
Payer: COMMERCIAL

## 2021-05-14 DIAGNOSIS — E83.51 HYPOCALCEMIA: ICD-10-CM

## 2021-05-14 DIAGNOSIS — N18.6 ESRD ON PERITONEAL DIALYSIS (HCC): ICD-10-CM

## 2021-05-14 DIAGNOSIS — E83.42 HYPOMAGNESEMIA: ICD-10-CM

## 2021-05-14 DIAGNOSIS — E87.6 HYPOKALEMIA: Primary | ICD-10-CM

## 2021-05-14 DIAGNOSIS — Z99.2 ESRD ON PERITONEAL DIALYSIS (HCC): ICD-10-CM

## 2021-05-14 DIAGNOSIS — R53.83 FATIGUE, UNSPECIFIED TYPE: ICD-10-CM

## 2021-05-14 DIAGNOSIS — S81.802S WOUND OF LEFT LEG, SEQUELA: ICD-10-CM

## 2021-05-14 PROBLEM — R55 SYNCOPE: Status: ACTIVE | Noted: 2021-05-14

## 2021-05-14 PROBLEM — R11.2 INTRACTABLE NAUSEA AND VOMITING: Status: ACTIVE | Noted: 2021-05-14

## 2021-05-14 LAB
ALBUMIN SERPL-MCNC: 1.6 G/DL (ref 3.5–5.2)
ALP BLD-CCNC: 115 U/L (ref 35–104)
ALT SERPL-CCNC: 6 U/L (ref 0–32)
ANION GAP SERPL CALCULATED.3IONS-SCNC: 10 MMOL/L (ref 7–16)
ANION GAP SERPL CALCULATED.3IONS-SCNC: 12 MMOL/L (ref 7–16)
AST SERPL-CCNC: 5 U/L (ref 0–31)
B.E.: 2.9 MMOL/L (ref -3–3)
BASOPHILS ABSOLUTE: 0.06 E9/L (ref 0–0.2)
BASOPHILS RELATIVE PERCENT: 1 % (ref 0–2)
BETA-HYDROXYBUTYRATE: 0.06 MMOL/L (ref 0.02–0.27)
BILIRUB SERPL-MCNC: <0.2 MG/DL (ref 0–1.2)
BUN BLDV-MCNC: 25 MG/DL (ref 6–20)
BUN BLDV-MCNC: 34 MG/DL (ref 6–20)
CALCIUM SERPL-MCNC: 5.7 MG/DL (ref 8.6–10.2)
CALCIUM SERPL-MCNC: 9.4 MG/DL (ref 8.6–10.2)
CHLORIDE BLD-SCNC: 101 MMOL/L (ref 98–107)
CHLORIDE BLD-SCNC: 113 MMOL/L (ref 98–107)
CO2: 20 MMOL/L (ref 22–29)
CO2: 27 MMOL/L (ref 22–29)
COHB: 1.7 % (ref 0–1.5)
CREAT SERPL-MCNC: 6.3 MG/DL (ref 0.5–1)
CREAT SERPL-MCNC: 9.1 MG/DL (ref 0.5–1)
CRITICAL: ABNORMAL
DATE ANALYZED: ABNORMAL
DATE OF COLLECTION: ABNORMAL
EOSINOPHILS ABSOLUTE: 0.16 E9/L (ref 0.05–0.5)
EOSINOPHILS RELATIVE PERCENT: 2.6 % (ref 0–6)
GFR AFRICAN AMERICAN: 6
GFR AFRICAN AMERICAN: 9
GFR NON-AFRICAN AMERICAN: 6 ML/MIN/1.73
GFR NON-AFRICAN AMERICAN: 9 ML/MIN/1.73
GLUCOSE BLD-MCNC: 119 MG/DL (ref 74–99)
GLUCOSE BLD-MCNC: 177 MG/DL (ref 74–99)
HCO3: 26.9 MMOL/L (ref 22–26)
HCT VFR BLD CALC: 28 % (ref 34–48)
HEMOGLOBIN: 9.1 G/DL (ref 11.5–15.5)
HHB: 1.4 % (ref 0–5)
IMMATURE GRANULOCYTES #: 0.03 E9/L
IMMATURE GRANULOCYTES %: 0.5 % (ref 0–5)
LAB: ABNORMAL
LACTIC ACID, SEPSIS: 1.3 MMOL/L (ref 0.5–1.9)
LACTIC ACID, SEPSIS: 2.4 MMOL/L (ref 0.5–1.9)
LIPASE: 5 U/L (ref 13–60)
LYMPHOCYTES ABSOLUTE: 1.9 E9/L (ref 1.5–4)
LYMPHOCYTES RELATIVE PERCENT: 31.3 % (ref 20–42)
Lab: ABNORMAL
MAGNESIUM: 1.2 MG/DL (ref 1.6–2.6)
MCH RBC QN AUTO: 29.7 PG (ref 26–35)
MCHC RBC AUTO-ENTMCNC: 32.5 % (ref 32–34.5)
MCV RBC AUTO: 91.5 FL (ref 80–99.9)
METER GLUCOSE: 277 MG/DL (ref 74–99)
METER GLUCOSE: 79 MG/DL (ref 74–99)
METHB: 0.3 % (ref 0–1.5)
MODE: ABNORMAL
MONOCYTES ABSOLUTE: 0.32 E9/L (ref 0.1–0.95)
MONOCYTES RELATIVE PERCENT: 5.3 % (ref 2–12)
NEUTROPHILS ABSOLUTE: 3.6 E9/L (ref 1.8–7.3)
NEUTROPHILS RELATIVE PERCENT: 59.3 % (ref 43–80)
O2 CONTENT: 16.2 ML/DL
O2 SATURATION: 98.6 % (ref 92–98.5)
O2HB: 96.6 % (ref 94–97)
OPERATOR ID: ABNORMAL
PATIENT TEMP: 37 C
PCO2: 39.3 MMHG (ref 35–45)
PDW BLD-RTO: 13.1 FL (ref 11.5–15)
PH BLOOD GAS: 7.45 (ref 7.35–7.45)
PH VENOUS: 7.45 (ref 7.35–7.45)
PHOSPHORUS: 2.6 MG/DL (ref 2.5–4.5)
PLATELET # BLD: 268 E9/L (ref 130–450)
PMV BLD AUTO: 9.9 FL (ref 7–12)
PO2: 161.5 MMHG (ref 75–100)
POTASSIUM REFLEX MAGNESIUM: 1.8 MMOL/L (ref 3.5–5)
POTASSIUM SERPL-SCNC: 3.5 MMOL/L (ref 3.5–5)
PRO-BNP: 6849 PG/ML (ref 0–125)
RBC # BLD: 3.06 E12/L (ref 3.5–5.5)
SARS-COV-2, NAAT: NOT DETECTED
SODIUM BLD-SCNC: 138 MMOL/L (ref 132–146)
SODIUM BLD-SCNC: 145 MMOL/L (ref 132–146)
SOURCE, BLOOD GAS: ABNORMAL
THB: 11.7 G/DL (ref 11.5–16.5)
TIME ANALYZED: 1508
TOTAL PROTEIN: 4.1 G/DL (ref 6.4–8.3)
TROPONIN: 0.12 NG/ML (ref 0–0.03)
WBC # BLD: 6.1 E9/L (ref 4.5–11.5)

## 2021-05-14 PROCEDURE — 2580000003 HC RX 258: Performed by: STUDENT IN AN ORGANIZED HEALTH CARE EDUCATION/TRAINING PROGRAM

## 2021-05-14 PROCEDURE — 99222 1ST HOSP IP/OBS MODERATE 55: CPT | Performed by: INTERNAL MEDICINE

## 2021-05-14 PROCEDURE — 2580000003 HC RX 258: Performed by: INTERNAL MEDICINE

## 2021-05-14 PROCEDURE — G0378 HOSPITAL OBSERVATION PER HR: HCPCS

## 2021-05-14 PROCEDURE — 99283 EMERGENCY DEPT VISIT LOW MDM: CPT

## 2021-05-14 PROCEDURE — 96366 THER/PROPH/DIAG IV INF ADDON: CPT

## 2021-05-14 PROCEDURE — 96368 THER/DIAG CONCURRENT INF: CPT

## 2021-05-14 PROCEDURE — 73590 X-RAY EXAM OF LOWER LEG: CPT

## 2021-05-14 PROCEDURE — 02HV33Z INSERTION OF INFUSION DEVICE INTO SUPERIOR VENA CAVA, PERCUTANEOUS APPROACH: ICD-10-PCS | Performed by: EMERGENCY MEDICINE

## 2021-05-14 PROCEDURE — 36556 INSERT NON-TUNNEL CV CATH: CPT

## 2021-05-14 PROCEDURE — 6370000000 HC RX 637 (ALT 250 FOR IP): Performed by: INTERNAL MEDICINE

## 2021-05-14 PROCEDURE — 2500000003 HC RX 250 WO HCPCS: Performed by: EMERGENCY MEDICINE

## 2021-05-14 PROCEDURE — 6360000002 HC RX W HCPCS: Performed by: STUDENT IN AN ORGANIZED HEALTH CARE EDUCATION/TRAINING PROGRAM

## 2021-05-14 PROCEDURE — 82805 BLOOD GASES W/O2 SATURATION: CPT

## 2021-05-14 PROCEDURE — 96367 TX/PROPH/DG ADDL SEQ IV INF: CPT

## 2021-05-14 PROCEDURE — 3E1M39Z IRRIGATION OF PERITONEAL CAVITY USING DIALYSATE, PERCUTANEOUS APPROACH: ICD-10-PCS | Performed by: INTERNAL MEDICINE

## 2021-05-14 PROCEDURE — 93005 ELECTROCARDIOGRAM TRACING: CPT | Performed by: STUDENT IN AN ORGANIZED HEALTH CARE EDUCATION/TRAINING PROGRAM

## 2021-05-14 PROCEDURE — 71045 X-RAY EXAM CHEST 1 VIEW: CPT

## 2021-05-14 PROCEDURE — 6360000002 HC RX W HCPCS: Performed by: EMERGENCY MEDICINE

## 2021-05-14 PROCEDURE — 2580000003 HC RX 258: Performed by: EMERGENCY MEDICINE

## 2021-05-14 PROCEDURE — 6360000002 HC RX W HCPCS: Performed by: INTERNAL MEDICINE

## 2021-05-14 PROCEDURE — 82962 GLUCOSE BLOOD TEST: CPT

## 2021-05-14 PROCEDURE — 96372 THER/PROPH/DIAG INJ SC/IM: CPT

## 2021-05-14 PROCEDURE — 96365 THER/PROPH/DIAG IV INF INIT: CPT

## 2021-05-14 PROCEDURE — 96375 TX/PRO/DX INJ NEW DRUG ADDON: CPT

## 2021-05-14 PROCEDURE — 90945 DIALYSIS ONE EVALUATION: CPT

## 2021-05-14 RX ORDER — ACETAMINOPHEN 650 MG/1
650 SUPPOSITORY RECTAL EVERY 6 HOURS PRN
Status: DISCONTINUED | OUTPATIENT
Start: 2021-05-14 | End: 2021-05-20 | Stop reason: HOSPADM

## 2021-05-14 RX ORDER — HEPARIN SODIUM 5000 [USP'U]/ML
5000 INJECTION, SOLUTION INTRAVENOUS; SUBCUTANEOUS EVERY 8 HOURS SCHEDULED
Status: DISCONTINUED | OUTPATIENT
Start: 2021-05-14 | End: 2021-05-20 | Stop reason: HOSPADM

## 2021-05-14 RX ORDER — HYDROXYZINE HYDROCHLORIDE 10 MG/1
25 TABLET, FILM COATED ORAL DAILY
Status: DISCONTINUED | OUTPATIENT
Start: 2021-05-15 | End: 2021-05-20 | Stop reason: HOSPADM

## 2021-05-14 RX ORDER — ACETAMINOPHEN 325 MG/1
650 TABLET ORAL EVERY 6 HOURS PRN
Status: DISCONTINUED | OUTPATIENT
Start: 2021-05-14 | End: 2021-05-20 | Stop reason: HOSPADM

## 2021-05-14 RX ORDER — POTASSIUM CHLORIDE 29.8 MG/ML
20 INJECTION INTRAVENOUS ONCE
Status: DISCONTINUED | OUTPATIENT
Start: 2021-05-14 | End: 2021-05-14 | Stop reason: CLARIF

## 2021-05-14 RX ORDER — PROMETHAZINE HYDROCHLORIDE 25 MG/1
12.5 TABLET ORAL EVERY 6 HOURS PRN
Status: DISCONTINUED | OUTPATIENT
Start: 2021-05-14 | End: 2021-05-20 | Stop reason: HOSPADM

## 2021-05-14 RX ORDER — MAGNESIUM SULFATE IN WATER 40 MG/ML
2000 INJECTION, SOLUTION INTRAVENOUS ONCE
Status: COMPLETED | OUTPATIENT
Start: 2021-05-14 | End: 2021-05-14

## 2021-05-14 RX ORDER — SODIUM CHLORIDE 0.9 % (FLUSH) 0.9 %
5-40 SYRINGE (ML) INJECTION EVERY 12 HOURS SCHEDULED
Status: DISCONTINUED | OUTPATIENT
Start: 2021-05-14 | End: 2021-05-19 | Stop reason: SDUPTHER

## 2021-05-14 RX ORDER — NICOTINE POLACRILEX 4 MG
15 LOZENGE BUCCAL PRN
Status: DISCONTINUED | OUTPATIENT
Start: 2021-05-14 | End: 2021-05-20 | Stop reason: HOSPADM

## 2021-05-14 RX ORDER — CALCIUM CHLORIDE 100 MG/ML
1000 INJECTION INTRAVENOUS; INTRAVENTRICULAR ONCE
Status: DISCONTINUED | OUTPATIENT
Start: 2021-05-14 | End: 2021-05-14 | Stop reason: CLARIF

## 2021-05-14 RX ORDER — DEXTROSE MONOHYDRATE 50 MG/ML
100 INJECTION, SOLUTION INTRAVENOUS PRN
Status: DISCONTINUED | OUTPATIENT
Start: 2021-05-14 | End: 2021-05-20 | Stop reason: HOSPADM

## 2021-05-14 RX ORDER — SODIUM CHLORIDE 0.9 % (FLUSH) 0.9 %
5-40 SYRINGE (ML) INJECTION PRN
Status: DISCONTINUED | OUTPATIENT
Start: 2021-05-14 | End: 2021-05-19 | Stop reason: SDUPTHER

## 2021-05-14 RX ORDER — METOCLOPRAMIDE 5 MG/1
5 TABLET ORAL 3 TIMES DAILY
Status: DISCONTINUED | OUTPATIENT
Start: 2021-05-15 | End: 2021-05-15 | Stop reason: ALTCHOICE

## 2021-05-14 RX ORDER — CALCIUM ACETATE 667 MG/1
667 CAPSULE ORAL
Status: DISCONTINUED | OUTPATIENT
Start: 2021-05-15 | End: 2021-05-18

## 2021-05-14 RX ORDER — ARIPIPRAZOLE 5 MG/1
5 TABLET ORAL NIGHTLY
Status: DISCONTINUED | OUTPATIENT
Start: 2021-05-14 | End: 2021-05-20 | Stop reason: HOSPADM

## 2021-05-14 RX ORDER — DEXTROSE MONOHYDRATE 25 G/50ML
12.5 INJECTION, SOLUTION INTRAVENOUS PRN
Status: DISCONTINUED | OUTPATIENT
Start: 2021-05-14 | End: 2021-05-20 | Stop reason: HOSPADM

## 2021-05-14 RX ORDER — FENTANYL CITRATE 50 UG/ML
50 INJECTION, SOLUTION INTRAMUSCULAR; INTRAVENOUS ONCE
Status: COMPLETED | OUTPATIENT
Start: 2021-05-14 | End: 2021-05-14

## 2021-05-14 RX ORDER — LEVOTHYROXINE SODIUM 0.07 MG/1
75 TABLET ORAL DAILY
Status: DISCONTINUED | OUTPATIENT
Start: 2021-05-15 | End: 2021-05-20 | Stop reason: HOSPADM

## 2021-05-14 RX ORDER — POTASSIUM CHLORIDE 7.45 MG/ML
10 INJECTION INTRAVENOUS
Status: COMPLETED | OUTPATIENT
Start: 2021-05-14 | End: 2021-05-14

## 2021-05-14 RX ORDER — INSULIN GLARGINE 100 [IU]/ML
3 INJECTION, SOLUTION SUBCUTANEOUS 2 TIMES DAILY
Status: DISCONTINUED | OUTPATIENT
Start: 2021-05-14 | End: 2021-05-20 | Stop reason: HOSPADM

## 2021-05-14 RX ORDER — ROPINIROLE 0.25 MG/1
0.25 TABLET, FILM COATED ORAL NIGHTLY
Status: DISCONTINUED | OUTPATIENT
Start: 2021-05-14 | End: 2021-05-20 | Stop reason: HOSPADM

## 2021-05-14 RX ORDER — SEVELAMER CARBONATE 800 MG/1
1600 TABLET, FILM COATED ORAL
Status: DISCONTINUED | OUTPATIENT
Start: 2021-05-15 | End: 2021-05-20 | Stop reason: HOSPADM

## 2021-05-14 RX ORDER — HYDRALAZINE HYDROCHLORIDE 10 MG/1
10 TABLET, FILM COATED ORAL 2 TIMES DAILY
Status: DISCONTINUED | OUTPATIENT
Start: 2021-05-14 | End: 2021-05-20 | Stop reason: HOSPADM

## 2021-05-14 RX ORDER — ALBUTEROL SULFATE 2.5 MG/3ML
2.5 SOLUTION RESPIRATORY (INHALATION) EVERY 6 HOURS PRN
Status: DISCONTINUED | OUTPATIENT
Start: 2021-05-14 | End: 2021-05-20 | Stop reason: HOSPADM

## 2021-05-14 RX ORDER — PANTOPRAZOLE SODIUM 40 MG/1
40 TABLET, DELAYED RELEASE ORAL
Status: DISCONTINUED | OUTPATIENT
Start: 2021-05-15 | End: 2021-05-20 | Stop reason: HOSPADM

## 2021-05-14 RX ORDER — DOCUSATE SODIUM 100 MG/1
100 CAPSULE, LIQUID FILLED ORAL 2 TIMES DAILY
Status: DISCONTINUED | OUTPATIENT
Start: 2021-05-14 | End: 2021-05-20 | Stop reason: HOSPADM

## 2021-05-14 RX ORDER — POLYETHYLENE GLYCOL 3350 17 G/17G
17 POWDER, FOR SOLUTION ORAL DAILY PRN
Status: DISCONTINUED | OUTPATIENT
Start: 2021-05-14 | End: 2021-05-20 | Stop reason: HOSPADM

## 2021-05-14 RX ORDER — ONDANSETRON 2 MG/ML
4 INJECTION INTRAMUSCULAR; INTRAVENOUS EVERY 6 HOURS PRN
Status: DISCONTINUED | OUTPATIENT
Start: 2021-05-14 | End: 2021-05-20 | Stop reason: HOSPADM

## 2021-05-14 RX ORDER — 0.9 % SODIUM CHLORIDE 0.9 %
1000 INTRAVENOUS SOLUTION INTRAVENOUS ONCE
Status: COMPLETED | OUTPATIENT
Start: 2021-05-14 | End: 2021-05-14

## 2021-05-14 RX ORDER — MIRTAZAPINE 15 MG/1
15 TABLET, FILM COATED ORAL NIGHTLY
Status: DISCONTINUED | OUTPATIENT
Start: 2021-05-14 | End: 2021-05-20 | Stop reason: HOSPADM

## 2021-05-14 RX ORDER — SODIUM CHLORIDE 9 MG/ML
25 INJECTION, SOLUTION INTRAVENOUS PRN
Status: DISCONTINUED | OUTPATIENT
Start: 2021-05-14 | End: 2021-05-19 | Stop reason: SDUPTHER

## 2021-05-14 RX ORDER — METOCLOPRAMIDE 5 MG/1
5 TABLET ORAL 3 TIMES DAILY
Status: DISCONTINUED | OUTPATIENT
Start: 2021-05-14 | End: 2021-05-14 | Stop reason: DRUGHIGH

## 2021-05-14 RX ADMIN — FENTANYL CITRATE 50 MCG: 50 INJECTION, SOLUTION INTRAMUSCULAR; INTRAVENOUS at 16:48

## 2021-05-14 RX ADMIN — ACETAMINOPHEN 650 MG: 325 TABLET ORAL at 22:05

## 2021-05-14 RX ADMIN — HEPARIN SODIUM 5000 UNITS: 5000 INJECTION INTRAVENOUS; SUBCUTANEOUS at 22:06

## 2021-05-14 RX ADMIN — POTASSIUM CHLORIDE 10 MEQ: 7.46 INJECTION, SOLUTION INTRAVENOUS at 15:52

## 2021-05-14 RX ADMIN — INSULIN GLARGINE 3 UNITS: 100 INJECTION, SOLUTION SUBCUTANEOUS at 22:34

## 2021-05-14 RX ADMIN — SODIUM CHLORIDE 1000 ML: 9 INJECTION, SOLUTION INTRAVENOUS at 15:52

## 2021-05-14 RX ADMIN — ARIPIPRAZOLE 5 MG: 5 TABLET ORAL at 22:06

## 2021-05-14 RX ADMIN — MIRTAZAPINE 15 MG: 15 TABLET, FILM COATED ORAL at 22:06

## 2021-05-14 RX ADMIN — POTASSIUM CHLORIDE 10 MEQ: 7.46 INJECTION, SOLUTION INTRAVENOUS at 17:51

## 2021-05-14 RX ADMIN — ROPINIROLE HYDROCHLORIDE 0.25 MG: 0.25 TABLET, FILM COATED ORAL at 22:06

## 2021-05-14 RX ADMIN — MAGNESIUM SULFATE HEPTAHYDRATE 2000 MG: 40 INJECTION, SOLUTION INTRAVENOUS at 15:52

## 2021-05-14 RX ADMIN — CALCIUM CHLORIDE 1000 MG: 100 INJECTION, SOLUTION INTRAVENOUS at 15:56

## 2021-05-14 RX ADMIN — HYDRALAZINE HYDROCHLORIDE 10 MG: 10 TABLET, FILM COATED ORAL at 22:05

## 2021-05-14 RX ADMIN — SODIUM CHLORIDE, PRESERVATIVE FREE 10 ML: 5 INJECTION INTRAVENOUS at 22:07

## 2021-05-14 RX ADMIN — DOCUSATE SODIUM 100 MG: 100 CAPSULE, LIQUID FILLED ORAL at 22:06

## 2021-05-14 ASSESSMENT — PAIN SCALES - GENERAL
PAINLEVEL_OUTOF10: 0
PAINLEVEL_OUTOF10: 8
PAINLEVEL_OUTOF10: 8

## 2021-05-14 ASSESSMENT — ENCOUNTER SYMPTOMS
VOMITING: 0
SINUS PRESSURE: 0
NAUSEA: 1
BACK PAIN: 0
ABDOMINAL DISTENTION: 0
DIARRHEA: 0
SORE THROAT: 0
ABDOMINAL PAIN: 0
WHEEZING: 0
SHORTNESS OF BREATH: 1
COUGH: 0

## 2021-05-14 ASSESSMENT — PAIN DESCRIPTION - DESCRIPTORS: DESCRIPTORS: CONSTANT;ACHING;DULL

## 2021-05-14 ASSESSMENT — PAIN DESCRIPTION - PROGRESSION: CLINICAL_PROGRESSION: NOT CHANGED

## 2021-05-14 ASSESSMENT — PAIN - FUNCTIONAL ASSESSMENT: PAIN_FUNCTIONAL_ASSESSMENT: PREVENTS OR INTERFERES SOME ACTIVE ACTIVITIES AND ADLS

## 2021-05-14 ASSESSMENT — PAIN DESCRIPTION - PAIN TYPE: TYPE: CHRONIC PAIN;ACUTE PAIN

## 2021-05-14 ASSESSMENT — PAIN DESCRIPTION - FREQUENCY: FREQUENCY: CONTINUOUS

## 2021-05-14 ASSESSMENT — PAIN DESCRIPTION - LOCATION: LOCATION: NECK;LEG

## 2021-05-14 ASSESSMENT — PAIN DESCRIPTION - ONSET: ONSET: ON-GOING

## 2021-05-14 ASSESSMENT — PAIN DESCRIPTION - ORIENTATION: ORIENTATION: RIGHT;LEFT

## 2021-05-14 NOTE — ED NOTES
Physician at bedside for CVC placement      [address] [facility], RN  05/14/21 3007

## 2021-05-14 NOTE — ED PROVIDER NOTES
This is a 74-year-old female with complex past medical history including end-stage renal disease on peritoneal dialysis, poorly controlled type 1 diabetes, CHF, presenting to the emergency department for altered mental status, nausea. Patient states that since last night she has had some shortness of breath when standing, has had nausea for the past 2 days. Symptoms have been persistent and moderate in severity. Gradual in onset. She states she had one episode of nonbloody/nonbilious vomiting. She denies any abdominal pain, denies any fevers or chills. She denies any lightheadedness or syncope. She endorses fatigue for the past 2 days, denies any confusion or syncope    She has history of poorly controlled diabetes, frequently is in DKA. She has had recent admission to the ICU on 2/15/2021, at that time had newly diagnosed wound on the left shin, thought to be necrotizing infection versus pyoderma gangrenosum, since then has had wound care. She states she thinks the wound is looking slightly worse. She states she follows with Dr. Dani Quarles for nephrology, does peritoneal dialysis, does not nightly and has not missed any sessions. She denies any cough. The history is provided by the patient and medical records. Review of Systems   Constitutional: Positive for fatigue. Negative for chills and fever. HENT: Negative for ear pain, sinus pressure and sore throat. Eyes: Negative for visual disturbance. Respiratory: Positive for shortness of breath. Negative for cough and wheezing. Cardiovascular: Negative for chest pain. Gastrointestinal: Positive for nausea. Negative for abdominal distention, abdominal pain, diarrhea and vomiting. Genitourinary: Negative for dysuria and flank pain. Musculoskeletal: Negative for arthralgias and back pain. Skin: Negative for rash and wound. Neurological: Negative for syncope, weakness, light-headedness and headaches.    Hematological: Negative for adenopathy. All other systems reviewed and are negative. Physical Exam  Vitals signs and nursing note reviewed. Constitutional:       Comments: Patient lying in bed, awake and alert, appears chronically ill   HENT:      Head: Normocephalic and atraumatic. Mouth/Throat:      Mouth: Mucous membranes are moist.      Pharynx: Oropharynx is clear. Eyes:      Conjunctiva/sclera: Conjunctivae normal.      Comments: Pupils 4 mm bilaterally, reactive. Neck:      Musculoskeletal: Normal range of motion and neck supple. Cardiovascular:      Rate and Rhythm: Regular rhythm. Tachycardia present. Pulses: Normal pulses. Heart sounds: Normal heart sounds. Pulmonary:      Effort: Pulmonary effort is normal. No respiratory distress. Breath sounds: Normal breath sounds. No wheezing or rales. Abdominal:      General: Abdomen is flat. There is no distension. Tenderness: There is no abdominal tenderness. There is no guarding. Comments: Peritoneal dialysis catheter in place, no surrounding erythema. Abdomen is soft, nontender. Previous surgical scars noted. Musculoskeletal:      Right lower leg: No edema. Left lower leg: No edema. Skin:     General: Skin is warm and dry. Capillary Refill: Capillary refill takes less than 2 seconds. Comments: 6 cm x 3 cm wound on left tibial region with staples around the perimeter. Small amount of purulent appearing discharge. No surrounding erythema, no palpable crepitus   Neurological:      General: No focal deficit present. Mental Status: She is alert and oriented to person, place, and time. Psychiatric:         Behavior: Behavior normal.         Thought Content:  Thought content normal.          Procedures   Central Line Placement Procedure Note    Indication: vascular access, poor peripheral access, hypovolemia, centrally administered medications and need for frequent blood draws    Consent: The patient was counseled regarding the procedure, its indications, risks, potential complications and alternatives, and any questions were answered. Consent was obtained to proceed. Procedure: The patient was positioned appropriately and the skin over the right internal jugular vein was prepped with Chloraprep and draped in a sterile fashion. Local anesthesia was obtained by infiltration using 2cc  1% Lidocaine without epinephrine. A large bore needle was used to identify the vein under ultrasound guidance. A guide wire was then inserted into the vein through the needle. A triple lumen catheter was then inserted into the vessel over the guide wire using the Seldinger technique. All ports showed good, free flowing blood return and were flushed with saline solution. The catheter was then securely fastened to the skin with suture at 15 cm. Two sutures were placed into the kit included tube clamp and proximal eyelets. An antibiotic disk was placed and the site was then covered with a sterile dressing. A post procedure X-ray was ordered and is still pending at this time. The patient tolerated the procedure well. Complications: None      MDM  Number of Diagnoses or Management Options  ESRD on peritoneal dialysis (HCC)  Fatigue, unspecified type  Hypocalcemia  Hypokalemia  Hypomagnesemia  Diagnosis management comments: This is a 71-year-old female with complex past medical history including end-stage renal disease on peritoneal dialysis, poorly controlled diabetes, CHF. Patient presenting with 2 days of fatigue and nausea with some shortness of breath when standing. Concern by boyfriend for confusion. On presentation patient was hypotensive, tachycardic, initial suspicion was for sepsis, potentially related to chronic wound on patient's left shin. Vascular access was unable to be obtained for an extended period of time.   Given lack of vascular access, foresee need for frequent blood draws and central administer medications, central line was placed. Lab work remarkable for severe hypokalemia with potassium of 1.8, hypocalcemia as well as hypomagnesemia. Patient was given 1 g of calcium chloride, 2 g of magnesium sulfate, 20 mEq of potassium via peripheral IV that was established while central line was being placed. Given patient's history of hyperkalemic cardiac arrest 3 months ago, caution was exercised before aggressive repletion of potassium. Repeat BMP was obtained which did show normal potassium after initial very small repletion of 20 mEq. It is possible that the first potassium level is erroneous. Patient's blood pressure improved after initial fluid resuscitation, patient is afebrile. Patient has had frequent admissions for DKA so initial work-up included pH, patient is not in DKA, has mild hyperglycemia. Given patient's severe electrolyte derangements, patient's nephrologist was consulted, he stated that patient will need to be admitted given the severity of electrolyte derangements, patient's nephrologist recommended admission. Is not clear the etiology of patient's for electrolyte derangements, but patient will be admitted for further monitoring, work-up, treatment. Amount and/or Complexity of Data Reviewed  Decide to obtain previous medical records or to obtain history from someone other than the patient: yes         ED Course as of May 14 2108   Fri May 14, 2021   1429 Patient is followed by nephrology Jeanine Canales. [RH]   5399 Chart review reveals patient had PEA arrest secondary to DKA/hyperkalemia in February of this year. [RH]   1530 Patient's potassium and calcium are critically low. IV supplementation has been ordered.     [MS]   0957 Patient's potassium critically low, given patient's recent hyperkalemic cardiac arrest, will repeat potassium now to verify all patient is getting 20 mEq via peripheral IV.    [RH]   1620 Patient was ordered 1 g calcium chloride, 2 g magnesium sulfate, 20 mEq potassium    [RH]   6075 Discussed case with nephrology Dr. Goodrich, he stated that patient needs to be admitted for his electrolyte abnormalities. He agreed with repeating BMP, states if it is truly low, to give at least 40 mEq p.o. additional potassium. [RH]   4451 Discussed case with hospitalist Dr. Tony Goel, he agreed to admit patient. [RH]   1847 Repeat potassium 3.5, patient only received 10 mEq out of 20 mEq of potassium ordered when this potassium was drawn    [RH]      ED Course User Index  [MS] Krystle Gonzáles DO  [RH] [address] Mcke[address] DO      ED Course as of May 14 2108   Fri May 14, 2021   1429 Patient is followed by nephrology Regulo Mcmullen. [RH]   6394 Chart review reveals patient had PEA arrest secondary to DKA/hyperkalemia in February of this year. [RH]   1530 Patient's potassium and calcium are critically low. IV supplementation has been ordered. [MS]   6587 Patient's potassium critically low, given patient's recent hyperkalemic cardiac arrest, will repeat potassium now to verify all patient is getting 20 mEq via peripheral IV.    [RH]   1620 Patient was ordered 1 g calcium chloride, 2 g magnesium sulfate, 20 mEq potassium    [RH]   1658 Discussed case with nephrology Dr. Goodrich, he stated that patient needs to be admitted for his electrolyte abnormalities. He agreed with repeating BMP, states if it is truly low, to give at least 40 mEq p.o. additional potassium. [RH]   0509 Discussed case with hospitalist Dr. Tony Goel, he agreed to admit patient.      [RH]   1847 Repeat potassium 3.5, patient only received 10 mEq out of 20 mEq of potassium ordered when this potassium was drawn    [RH]      ED Course User Index  [MS] Krystle Gonzáles DO  [RH] [address], DO       --------------------------------------------- PAST HISTORY ---------------------------------------------  Past Medical History:  has a past medical history of Acute congestive heart failure ([address] BC (acute kidney injury) ([address] Cardiac arrest ([address] Cephalgia, Chronic kidney disease, Depression, Diabetes mellitus ([address] Diabetic gastroparesis associated with type 1 diabetes mellitus ([address] Diabetic ketoacidosis ([address] Diabetic ketoacidosis with coma associated with type 1 diabetes mellitus ([address] Diabetic polyneuropathy associated with type 1 diabetes mellitus ([address] Drug use complicating pregnancy in third trimester, Endocarditis, ESRD (end stage renal disease) ([address] Hemodialysis patient ([address] History of blood transfusion, Hyperlipidemia, Hyperosmolar hyperglycemic state (HHS) ([address] Hypothyroidism, Iron deficiency anemia, MDRO (multiple drug resistant organisms) resistance, MRSA (methicillin resistant Staphylococcus aureus), Non compliance w medication regimen, Other disorders of kidney and ureter, Pregnancy, Previous  delivery affecting pregnancy, antepartum, Previous stillbirth or demise, antepartum, Seizure ([address] Severe pre-eclampsia in third trimester, Shock liver, and Valvular endocarditis. Past Surgical History:  has a past surgical history that includes ECHO Compl W Dop Color Flow (2012); ECHO Compl W Dop Color Flow (6/10/2013);  section; Tunneled venous port placement (2018); pr esophagogastroduodenoscopy transoral diagnostic (N/A, 2018); back surgery; Colonoscopy (N/A, 2018); Colonoscopy (N/A, 2018); Upper gastrointestinal endoscopy (2018); Upper gastrointestinal endoscopy (N/A, 10/11/2019); Cholecystectomy, laparoscopic (N/A, 2019); LAPAROSCOPY INSERTION PERITONEAL CATHETER (N/A, 2020); transesophageal echocardiogram (N/A, 10/19/2020); embolectomy (N/A, 2020); and Foot Debridement (Left, 2021). Social History:  reports that she quit smoking about 3 months ago. Her smoking use included cigarettes. She has a 3.00 pack-year smoking history.  She has never used smokeless tobacco. She reports that she does not drink alcohol or g/dL    Albumin 1.6 (L) 3.5 - 5.2 g/dL    Total Bilirubin <0.2 0.0 - 1.2 mg/dL    Alkaline Phosphatase 115 (H) 35 - 104 U/L    ALT 6 0 - 32 U/L    AST 5 0 - 31 U/L   Lipase   Result Value Ref Range    Lipase 5 (L) 13 - 60 U/L   Troponin   Result Value Ref Range    Troponin 0.12 (H) 0.00 - 0.03 ng/mL   Brain Natriuretic Peptide   Result Value Ref Range    Pro-BNP 6,849 (H) 0 - 125 pg/mL   PH, VENOUS   Result Value Ref Range    pH, Khurram 7.45 7.35 - 7.45   Beta-Hydroxybutyrate   Result Value Ref Range    Beta-Hydroxybutyrate 0.06 0.02 - 0.27 mmol/L   Lactate, Sepsis   Result Value Ref Range    Lactic Acid, Sepsis 2.4 (H) 0.5 - 1.9 mmol/L   Lactate, Sepsis   Result Value Ref Range    Lactic Acid, Sepsis 1.3 0.5 - 1.9 mmol/L   Magnesium   Result Value Ref Range    Magnesium 1.2 (L) 1.6 - 2.6 mg/dL   Phosphorus   Result Value Ref Range    Phosphorus 2.6 2.5 - 4.5 mg/dL   Blood Gas, Arterial   Result Value Ref Range    Date Analyzed [id number]     Time Analyzed 1508     Source: Blood Arterial     pH, Blood Gas 7.454 (H) 7.350 - 7.450    PCO2 39.3 35.0 - 45.0 mmHg    PO2 161.5 (H) 75.0 - 100.0 mmHg    HCO3 26.9 (H) 22.0 - 26.0 mmol/L    B.E. 2.9 -3.0 - 3.0 mmol/L    O2 Sat 98.6 (H) 92.0 - 98.5 %    O2Hb 96.6 94.0 - 97.0 %    COHb 1.7 (H) 0.0 - 1.5 %    MetHb 0.3 0.0 - 1.5 %    O2 Content 16.2 mL/dL    HHb 1.4 0.0 - 5.0 %    tHb (est) 11.7 11.5 - 16.5 g/dL    Mode RA     Date Of Collection      Time Collected      Pt Temp 37.0 C     ID [id number]     Lab [id number]     Critical(s) Notified .  No Critical Values    Basic metabolic panel   Result Value Ref Range    Sodium 138 132 - 146 mmol/L    Potassium 3.5 3.5 - 5.0 mmol/L    Chloride 101 98 - 107 mmol/L    CO2 27 22 - 29 mmol/L    Anion Gap 10 7 - 16 mmol/L    Glucose 119 (H) 74 - 99 mg/dL    BUN 34 (H) 6 - 20 mg/dL    CREATININE 9.1 (HH) 0.5 - 1.0 mg/dL    GFR Non-African American 6 >=60 mL/min/1.73    GFR African American 6     Calcium 9.4 8.6 - 10.2 mg/dL   POCT Glucose Result Value Ref Range    Meter Glucose 277 (H) 74 - 99 mg/dL   EKG 12 Lead   Result Value Ref Range    Ventricular Rate 105 BPM    Atrial Rate 105 BPM    P-R Interval 130 ms    QRS Duration 96 ms    Q-T Interval 336 ms    QTc Calculation (Bazett) 444 ms    P Axis 43 degrees    R Axis 40 degrees    T Axis -150 degrees       Radiology  XR TIBIA FIBULA LEFT (2 VIEWS)   Final Result   1. Surgical staples along the medial left shin. Mild soft tissue stranding   in this region. No evidence of subcutaneous air. 2.  No osseous abnormality seen about the left tibia or fibula on this exam.   No definite evidence osseous destruction. XR CHEST PORTABLE   Final Result   1. No evidence of acute cardiopulmonary pathology. 2.  Right internal jugular central venous catheter             EKG:  This EKG is signed and interpreted by me. Rate: 105  Rhythm: Sinus  Interpretation: sinus tachycardia  Comparison: changes compared to previous EKG, mostly stable, T wave inversions slightly more prominent in inferior leads. ------------------------- NURSING NOTES AND VITALS REVIEWED ---------------------------  Date / Time Roomed:  5/14/2021  1:56 PM  ED Bed Assignment:  0629/0629-01    The nursing notes within the ED encounter and vital signs as below have been reviewed.    Patient Vitals for the past 24 hrs:   BP Temp Temp src Pulse Resp SpO2 Height Weight   05/14/21 2015 (!) 197/86 98.1 °F (36.7 °C) Oral 91 15 97 % -- --   05/14/21 1944 (!) 146/82 98 °F (36.7 °C) Oral 92 14 99 % -- --   05/14/21 1751 (!) 141/96 -- -- 86 12 96 % -- --   05/14/21 1616 100/72 -- -- 95 12 98 % -- --   05/14/21 1529 101/77 -- -- 107 16 95 % -- --   05/14/21 1353 (!) 69/41 98.6 °F (37 °C) Oral 108 16 100 % 5' 4\" (1.626 m) 146 lb (66.2 kg)       Oxygen Saturation Interpretation: Normal      ------------------------------------------ PROGRESS NOTES ------------------------------------------  Re-evaluation(s):  See ED COURSE    I have spoken with the patient and discussed todays results, in addition to providing specific details for the plan of care and counseling regarding the diagnosis and prognosis. Their questions are answered at this time and they are agreeable with the plan.      --------------------------------- ADDITIONAL PROVIDER NOTES ---------------------------------  Consultations:  See ED COURSE    This patient's ED course included: a personal history and physicial examination, re-evaluation prior to disposition, multiple bedside re-evaluations, IV medications, cardiac monitoring, continuous pulse oximetry and complex medical decision making and emergency management    This patient has improved during their ED course. Please note that the withdrawal or failure to initiate urgent interventions for this patient would likely result in a life threatening deterioration or permanent disability. See attending note for any possible critical care time. Clinical Impression  1. Hypokalemia New Problem   2. Hypocalcemia New Problem   3. Hypomagnesemia New Problem   4. ESRD on peritoneal dialysis ([address] Stable   5. Fatigue, unspecified type          Disposition  Patient's disposition: Admit to telemetry  Patient's condition is stable. ATTENDING PROVIDER ATTESTATION:     Angus García presented to the emergency department for evaluation of Altered Mental Status (fatigue, progressive weakness today, EMS called by boyfriend, patient responsive to strong verbal stimulus at present)    I have reviewed and discussed the case, including pertinent history (medical, surgical, family and social) and exam findings with the Resident and the Nurse assigned to Angus García. I have personally performed and/or participated in the history, exam, medical decision making, and procedures and agree with all pertinent clinical information. Patient presents to the ED for altered mental status.   She is very lethargic upon arrival.  She is tachycardic. I have reviewed my findings and recommendations with [address] and members of family present at the time of disposition. Critical care:  Please note that the withdrawal or failure to initiate urgent interventions for this patient would likely result in a life threatening deterioration or permanent disability. Systems at risk for deterioration include: Hypokalemia, hypomagnesemia, and hypocalcemia requiring admission and treatment. Accordingly this patient received 35 minutes of critical care time, excluding separately billable procedures. MDM: Supportive care, will obtain appropriate labs and imaging to assess patient's Altered Mental Status (fatigue, progressive weakness today, EMS called by boyfriend, patient responsive to strong verbal stimulus at present)       My findings/plan: The primary encounter diagnosis was Hypokalemia. Diagnoses of Hypocalcemia, Hypomagnesemia, ESRD on peritoneal dialysis ([facility] [address] and Fatigue, unspecified type were also pertinent to this visit.   Current Discharge Medication List        DO Isadora Stephens DO  Resident  05/14/21 2108       Stephanie Mai DO  05/15/21 7882

## 2021-05-14 NOTE — Clinical Note
Patient Class: Observation [104]   REQUIRED: Diagnosis: Intractable nausea and vomiting [477888]   Estimated Length of Stay: Estimated stay of less than 2 midnights   Admitting Provider: Juanda Brittle [[id number]]   Telemetry/Cardiac Monitoring Required?: Yes

## 2021-05-14 NOTE — H&P
[address] [place], [place] [address]ist Group   History and Physical      CHIEF COMPLAINT: Nausea and vomiting, shortness of breath    History of Present Illness:  31 y.o. female with a history of end-stage renal disease on peritoneal dialysis, type 1 diabetes mellitus, diastolic heart failure with  in February with cardiac arrest.She is lethargic, does not provide much history. Over the past day she has had nausea, vomiting, shortness of breath, and syncope. States that she has been \"passing out\" several times per day. Denies any chest pain, orthopnea, visual changes or hearing changes during the episodes. Denies any fevers, chills, known sick contacts. In the ED, ABG only showed slight alkalemia and PaO2 of 161.5. COVID-19 test negative. Chest x-ray unremarkable. Beta hydroxybutyrate was not elevated. BMP showed significant electrolyte derangement with potassium of 1.8, chloride of 113, magnesium 1.2, and calcium of 5.7. She was given a 1 L bolus normal saline, 2 g magnesium sulfate, and 20 mEq of potassium chloride IV. Repeat BMP 3 hours after this shows potassium of 3.5, calcium of 9.4. She also has a 6 cm x 3 cm wound on the left tibia with small amount of purulent drainage seen in the ED. She has been following with podiatry for this. Informant(s) for H&P: Patient, ED resident physician    REVIEW OF SYSTEMS:  no fevers, chills, cp, ha, vision/hearing changes, wt changes, hot/cold flashes, other open skin lesions, diarrhea, constipation, dysuria/hematuria unless noted in HPI. Complete ROS performed with the patient and is otherwise negative.       PMH:  Past Medical History:   Diagnosis Date    Acute congestive heart failure ([address]     BC (acute kidney injury) ([address] 10/1/2019    Cardiac arrest ([address] 2/15/2021    Cephalgia 10/9/2019    Chronic kidney disease     Depression     Diabetes mellitus ([address]     Diabetic gastroparesis associated with type 1 diabetes mellitus ([address] 2018    Diabetic ketoacidosis ([address] 2011    Diabetic ketoacidosis with coma associated with type 1 diabetes mellitus ([address] 2013    Diabetic polyneuropathy associated with type 1 diabetes mellitus ([address] 2020    Drug use complicating pregnancy in third trimester     Endocarditis 10/31/2020    ESRD (end stage renal disease) ([address] 2020    Hemodialysis patient ([address]     History of blood transfusion 2019    Hyperlipidemia 10/8/2020    Hyperosmolar hyperglycemic state (HHS) ([address] 2020    Hypothyroidism 10/8/2020    Iron deficiency anemia 10/1/2019    MDRO (multiple drug resistant organisms) resistance     MRSA (methicillin resistant Staphylococcus aureus)     back wound abcess    Non compliance w medication regimen 3/30/2016    Other disorders of kidney and ureter     Pregnancy 3/30/2016    16 weeks    Previous  delivery affecting pregnancy, antepartum 3/2/2017    Previous stillbirth or demise, antepartum 2016    Seizure ([address] 2020    Severe pre-eclampsia in third trimester 2016    Shock liver 2/15/2021    Valvular endocarditis 11/10/2020    This Diagnosis was added to the Problem List based on transcribed orders from Dr. Ruben Fall          Surgical History:  Past Surgical History:   Procedure Laterality Date    BACK SURGERY      abscess     SECTION      x2    CHOLECYSTECTOMY, LAPAROSCOPIC N/A 2019    CHOLECYSTECTOMY LAPAROSCOPIC performed by Pete Dunne MD at [facility] 2018    COLONOSCOPY WITH BIOPSY performed by Sheela Newton MD at [facility] N/A 2018    COLONOSCOPY WITH BIOPSY performed by Lieutenant Spurling, MD at [id number] [place] [address] ECHO COMPL W [address]  2012    EF 57%    ECHO COMPL W DOP COLOR FLOW  6/10/2013         EMBOLECTOMY N/A 2020    YULISSA Bergman -- REQS ROOM 3 performed by Amanda Zaragoza MD at [facility] Left 2021 LEFT LEG INCISION AND DRAINAGE, DEBRIDEMENT, WOUND VAC APPLICATION performed by Felipe Dunn DPM at [facility] 20 N/A 6/26/2020    LAPAROSCOPIC INSERTION PERITONEAL DIALYSIS CATHETER performed by Wesley Persaud MD at [facility] 80 ESOPHAGOGASTRODUODENOSCOPY TRANSORAL DIAGNOSTIC N/A 5/30/2018    EGD ESOPHAGOGASTRODUODENOSCOPY performed by Dayna Tolentino MD at [address] [place] [address] TRANSESOPHAGEAL ECHOCARDIOGRAM N/A 10/19/2020    TRANSESOPHAGEAL ECHOCARDIOGRAM WITH BUBBLE STUDY performed by Gunner Ibarra MD at [address] [place] [address] TUNNELED VENOUS PORT PLACEMENT  04/2018    UPPER GASTROINTESTINAL ENDOSCOPY  12/18/2018    EGD BIOPSY performed by Pauline Herrera MD at [facility] N/A 10/11/2019    EGD ESOPHAGOGASTRODUODENOSCOPY performed by Roni Elizondo DO at [place] [address]       Medications Prior to Admission:    Prior to Admission medications    Medication Sig Start Date End Date Taking?  Authorizing Provider   mirtazapine (REMERON) 15 MG tablet Take 15 mg by mouth nightly    Historical Provider, MD   hydrOXYzine (ATARAX) 25 MG tablet Take 25 mg by mouth daily    Historical Provider, MD   polyethylene glycol (GLYCOLAX) 17 g packet Take 17 g by mouth daily as needed for Constipation    Historical Provider, MD   albuterol (PROVENTIL) (2.5 MG/3ML) 0.083% nebulizer solution Take 2.5 mg by nebulization every 6 hours as needed for Wheezing    Historical Provider, MD   darbepoetin nena-polysorbate (ARANESP, ALBUMIN FREE,) 60 MCG/0.3ML SOSY injection Inject 60 mcg into the skin Every 2 weeks at dialysis    Historical Provider, MD   ARIPiprazole (ABILIFY) 5 MG tablet Take 5 mg by mouth nightly 4/18/21   Historical Provider, MD   docusate sodium (COLACE) 100 MG capsule Take 100 mg by mouth 2 times daily    Historical Provider, MD   Glucagon, rDNA, (GLUCAGON EMERGENCY IJ) Inject as directed    Historical Provider, MD   glucose (GLUTOSE) 40 % times daily     Historical Provider, MD       Allergies:    Cefepime and Toradol [ketorolac tromethamine]    Social History:    reports that she quit smoking about 3 months ago. Her smoking use included cigarettes. She has a 3.00 pack-year smoking history. She has never used smokeless tobacco. She reports that she does not drink alcohol or use drugs. Family History:   family history includes Asthma in her brother and mother; Diabetes in her mother; High Blood Pressure in her father and mother; Hypertension in her mother. PHYSICAL EXAM:  Vitals:  BP (!) 141/96   Pulse 86   Temp 98.6 °F (37 °C) (Oral)   Resp 12   Ht 5' 4\" (1.626 m)   Wt 146 lb (66.2 kg)   SpO2 96%   BMI 25.06 kg/m²     General Appearance: Somnolent, but awakens to voice. Appears anxious. Skin: warm and dry  Head: normocephalic and atraumatic  Eyes: pupils equal, round, and reactive to light, extraocular eye movements intact, conjunctivae normal  Neck: neck supple and non tender without mass   Pulmonary/Chest: clear to auscultation bilaterally- no wheezes, rales or rhonchi, normal air movement, no respiratory distress  Cardiovascular: normal rate, normal S1 and S2 and no carotid bruits  Abdomen: soft, non-tender, non-distended, normal bowel sounds, no masses or organomegaly  Extremities: Left tibia with ulcerated wound without drainage or surrounding erythema. Neurologic: no cranial nerve deficit and speech normal    LABS:  Recent Labs     05/14/21  1426 05/14/21  1746    138   K 1.8* 3.5   * 101   CO2 20* 27   BUN 25* 34*   CREATININE 6.3* 9.1*   GLUCOSE 177* 119*   CALCIUM 5.7* 9.4       Recent Labs     05/14/21  1426   WBC 6.1   RBC 3.06*   HGB 9.1*   HCT 28.0*   MCV 91.5   MCH 29.7   MCHC 32.5   RDW 13.1      MPV 9.9       No results for input(s): POCGLU in the last 72 hours.       Radiology: Xr Tibia Fibula Left (2 Views)    Result Date: 5/14/2021  EXAMINATION: XRAY VIEWS OF THE LEFT TIBIA AND FIBULA 5/14/2021 3:55 pm COMPARISON: Left tibia and fibula series from February 22, 2021 HISTORY: ORDERING SYSTEM PROVIDED HISTORY: Superficial infection left tibia with staples in place, evaluate for subcutaneous air or evidence of osteomyelitis TECHNOLOGIST PROVIDED HISTORY: Reason for exam:->Superficial infection left tibia with staples in place, evaluate for subcutaneous air or evidence of osteomyelitis FINDINGS: There are surgical staples along the medial aspect of the mid left shin. Mild soft tissue stranding in this region. There is no evidence of subcutaneous air. No cortical irregularities nor abnormal periosteal elevation along the course of the left tibia or fibula. No evidence of osseous destruction or osseous erosions. Limited evaluation of the left knee and ankle joints are unremarkable. Osseous mineralization is normal.     1.  Surgical staples along the medial left shin. Mild soft tissue stranding in this region. No evidence of subcutaneous air. 2.  No osseous abnormality seen about the left tibia or fibula on this exam. No definite evidence osseous destruction. Xr Chest Portable    Result Date: 5/14/2021  EXAMINATION: ONE XRAY VIEW OF THE CHEST 5/14/2021 3:55 pm COMPARISON: Chest series from February 24, 2021 HISTORY: ORDERING SYSTEM PROVIDED HISTORY: SOB, eval for PNA vs CHF TECHNOLOGIST PROVIDED HISTORY: Reason for exam:->SOB, eval for PNA vs CHF FINDINGS: Right internal jugular central venous catheter with distal tip projecting near the superior cavoatrial junction. Adequate and symmetric aeration of the lungs. There are no formed consolidations, pleural effusions, or pneumothoraces. Trachea and central mainstem bronchi appear clear. The cardiomediastinal silhouette and pulmonary vascularity appear within normal limits. Osseous and thoracic soft tissue structures demonstrate no acute findings. Cholecystectomy clips in the right upper quadrant. 1.  No evidence of acute cardiopulmonary pathology.  2. Right internal jugular central venous catheter       EKG: Sinus tachycardia    ASSESSMENT/PLAN:      Principal Problem:    Hypokalemia  Active Problems:    Hypertension    Uncontrolled type 1 diabetes mellitus with hyperglycemia, with long-term current use of insulin (HCC)    ESRD on peritoneal dialysis (HCC)    Hypomagnesemia    Hypocalcemia    Intractable nausea and vomiting  Resolved Problems:    * No resolved hospital problems. *      1. Hypokalemia/hypomagnesemia/hypocalcemia  -Unclear if these lab results were accurate, as this quickly resolved with above-mentioned treatment  -Repeat renal panel tomorrow morning    2. Intractable nausea and vomiting  -Has not occurred in the ED. May be related to electrolyte abnormalities. Treat symptomatically and monitor    3. Syncope  -Her symptom description is vague, however this is concerning given her cardiac arrest in February.    -Will obtain limited echocardiogram and monitor on telemetry    4. End-stage renal disease  -Nephrology consult requested for peritoneal dialysis management    5. Type 1 diabetes mellitus  -Continue home basal, prandial, and corrective scale insulin regimen    6. Left lower extremity wound  -This has been present since February. At that time, she had surgical debridement was on broad-spectrum antibiotic coverage. She is afebrile, no leukocytosis, and wound does not appear to have any significant rounding erythema. Will consult wound care and monitor for any progression or signs of infection      Code Status: Full code  DVT prophylaxis: Subcutaneous heparin    NOTE: This report was transcribed using voice recognition software.  Every effort was made to ensure accuracy; however, inadvertent computerized transcription errors may be present.     Electronically signed by Anika Dewey DO on 5/14/2021 at 6:49 PM

## 2021-05-15 LAB
ALBUMIN SERPL-MCNC: 2.2 G/DL (ref 3.5–5.2)
ANION GAP SERPL CALCULATED.3IONS-SCNC: 14 MMOL/L (ref 7–16)
BASOPHILS ABSOLUTE: 0.09 E9/L (ref 0–0.2)
BASOPHILS RELATIVE PERCENT: 1.4 % (ref 0–2)
BUN BLDV-MCNC: 31 MG/DL (ref 6–20)
CALCIUM IONIZED: 1.31 MMOL/L (ref 1.15–1.33)
CALCIUM SERPL-MCNC: 8.9 MG/DL (ref 8.6–10.2)
CHLORIDE BLD-SCNC: 100 MMOL/L (ref 98–107)
CO2: 26 MMOL/L (ref 22–29)
CREAT SERPL-MCNC: 9 MG/DL (ref 0.5–1)
EKG ATRIAL RATE: 105 BPM
EKG P AXIS: 43 DEGREES
EKG P-R INTERVAL: 130 MS
EKG Q-T INTERVAL: 336 MS
EKG QRS DURATION: 96 MS
EKG QTC CALCULATION (BAZETT): 444 MS
EKG R AXIS: 40 DEGREES
EKG T AXIS: -150 DEGREES
EKG VENTRICULAR RATE: 105 BPM
EOSINOPHILS ABSOLUTE: 0.25 E9/L (ref 0.05–0.5)
EOSINOPHILS RELATIVE PERCENT: 3.9 % (ref 0–6)
GFR AFRICAN AMERICAN: 6
GFR NON-AFRICAN AMERICAN: 6 ML/MIN/1.73
GLUCOSE BLD-MCNC: 186 MG/DL (ref 74–99)
HCT VFR BLD CALC: 32.5 % (ref 34–48)
HEMOGLOBIN: 10.5 G/DL (ref 11.5–15.5)
IMMATURE GRANULOCYTES #: 0.04 E9/L
IMMATURE GRANULOCYTES %: 0.6 % (ref 0–5)
LYMPHOCYTES ABSOLUTE: 2.46 E9/L (ref 1.5–4)
LYMPHOCYTES RELATIVE PERCENT: 38.7 % (ref 20–42)
MAGNESIUM: 2.4 MG/DL (ref 1.6–2.6)
MCH RBC QN AUTO: 29.7 PG (ref 26–35)
MCHC RBC AUTO-ENTMCNC: 32.3 % (ref 32–34.5)
MCV RBC AUTO: 92.1 FL (ref 80–99.9)
METER GLUCOSE: 101 MG/DL (ref 74–99)
METER GLUCOSE: 120 MG/DL (ref 74–99)
METER GLUCOSE: 152 MG/DL (ref 74–99)
METER GLUCOSE: 263 MG/DL (ref 74–99)
METER GLUCOSE: 63 MG/DL (ref 74–99)
MONOCYTES ABSOLUTE: 0.33 E9/L (ref 0.1–0.95)
MONOCYTES RELATIVE PERCENT: 5.2 % (ref 2–12)
NEUTROPHILS ABSOLUTE: 3.18 E9/L (ref 1.8–7.3)
NEUTROPHILS RELATIVE PERCENT: 50.2 % (ref 43–80)
PDW BLD-RTO: 13.3 FL (ref 11.5–15)
PHOSPHORUS: 4.2 MG/DL (ref 2.5–4.5)
PLATELET # BLD: 331 E9/L (ref 130–450)
PMV BLD AUTO: 10.2 FL (ref 7–12)
POTASSIUM SERPL-SCNC: 2.9 MMOL/L (ref 3.5–5)
RBC # BLD: 3.53 E12/L (ref 3.5–5.5)
SODIUM BLD-SCNC: 140 MMOL/L (ref 132–146)
WBC # BLD: 6.4 E9/L (ref 4.5–11.5)

## 2021-05-15 PROCEDURE — 90945 DIALYSIS ONE EVALUATION: CPT

## 2021-05-15 PROCEDURE — 82962 GLUCOSE BLOOD TEST: CPT

## 2021-05-15 PROCEDURE — 85025 COMPLETE CBC W/AUTO DIFF WBC: CPT

## 2021-05-15 PROCEDURE — 96375 TX/PRO/DX INJ NEW DRUG ADDON: CPT

## 2021-05-15 PROCEDURE — 83735 ASSAY OF MAGNESIUM: CPT

## 2021-05-15 PROCEDURE — 82330 ASSAY OF CALCIUM: CPT

## 2021-05-15 PROCEDURE — 6360000002 HC RX W HCPCS

## 2021-05-15 PROCEDURE — 36415 COLL VENOUS BLD VENIPUNCTURE: CPT

## 2021-05-15 PROCEDURE — 93308 TTE F-UP OR LMTD: CPT

## 2021-05-15 PROCEDURE — 99232 SBSQ HOSP IP/OBS MODERATE 35: CPT | Performed by: INTERNAL MEDICINE

## 2021-05-15 PROCEDURE — 2500000003 HC RX 250 WO HCPCS: Performed by: INTERNAL MEDICINE

## 2021-05-15 PROCEDURE — 2580000003 HC RX 258: Performed by: INTERNAL MEDICINE

## 2021-05-15 PROCEDURE — 6370000000 HC RX 637 (ALT 250 FOR IP): Performed by: INTERNAL MEDICINE

## 2021-05-15 PROCEDURE — 6360000002 HC RX W HCPCS: Performed by: INTERNAL MEDICINE

## 2021-05-15 PROCEDURE — 93010 ELECTROCARDIOGRAM REPORT: CPT | Performed by: INTERNAL MEDICINE

## 2021-05-15 PROCEDURE — 96372 THER/PROPH/DIAG INJ SC/IM: CPT

## 2021-05-15 PROCEDURE — 80069 RENAL FUNCTION PANEL: CPT

## 2021-05-15 PROCEDURE — 2060000000 HC ICU INTERMEDIATE R&B

## 2021-05-15 RX ORDER — POTASSIUM CHLORIDE 20 MEQ/1
20 TABLET, EXTENDED RELEASE ORAL ONCE
Status: COMPLETED | OUTPATIENT
Start: 2021-05-15 | End: 2021-05-15

## 2021-05-15 RX ORDER — FENTANYL CITRATE 50 UG/ML
INJECTION, SOLUTION INTRAMUSCULAR; INTRAVENOUS
Status: COMPLETED
Start: 2021-05-15 | End: 2021-05-15

## 2021-05-15 RX ORDER — FENTANYL CITRATE 50 UG/ML
25 INJECTION, SOLUTION INTRAMUSCULAR; INTRAVENOUS
Status: DISCONTINUED | OUTPATIENT
Start: 2021-05-15 | End: 2021-05-15

## 2021-05-15 RX ORDER — FENTANYL CITRATE 50 UG/ML
25 INJECTION, SOLUTION INTRAMUSCULAR; INTRAVENOUS
Status: DISCONTINUED | OUTPATIENT
Start: 2021-05-15 | End: 2021-05-16

## 2021-05-15 RX ORDER — POTASSIUM CHLORIDE 20 MEQ/1
20 TABLET, EXTENDED RELEASE ORAL
Status: DISCONTINUED | OUTPATIENT
Start: 2021-05-16 | End: 2021-05-20 | Stop reason: HOSPADM

## 2021-05-15 RX ORDER — LOPERAMIDE HYDROCHLORIDE 2 MG/1
2 CAPSULE ORAL 4 TIMES DAILY PRN
Status: DISCONTINUED | OUTPATIENT
Start: 2021-05-15 | End: 2021-05-20 | Stop reason: HOSPADM

## 2021-05-15 RX ORDER — OXYCODONE HYDROCHLORIDE 5 MG/1
5 TABLET ORAL EVERY 4 HOURS PRN
Status: DISCONTINUED | OUTPATIENT
Start: 2021-05-15 | End: 2021-05-15

## 2021-05-15 RX ADMIN — PROMETHAZINE HYDROCHLORIDE 12.5 MG: 25 TABLET ORAL at 10:10

## 2021-05-15 RX ADMIN — FENTANYL CITRATE 25 MCG: 50 INJECTION, SOLUTION INTRAMUSCULAR; INTRAVENOUS at 20:39

## 2021-05-15 RX ADMIN — MIRTAZAPINE 15 MG: 15 TABLET, FILM COATED ORAL at 20:38

## 2021-05-15 RX ADMIN — SODIUM CHLORIDE, PRESERVATIVE FREE 10 ML: 5 INJECTION INTRAVENOUS at 17:03

## 2021-05-15 RX ADMIN — ROPINIROLE HYDROCHLORIDE 0.25 MG: 0.25 TABLET, FILM COATED ORAL at 20:38

## 2021-05-15 RX ADMIN — ACETAMINOPHEN 650 MG: 325 TABLET ORAL at 09:19

## 2021-05-15 RX ADMIN — PROMETHAZINE HYDROCHLORIDE 12.5 MG: 25 TABLET ORAL at 20:38

## 2021-05-15 RX ADMIN — LOPERAMIDE HYDROCHLORIDE 2 MG: 2 CAPSULE ORAL at 20:37

## 2021-05-15 RX ADMIN — SEVELAMER CARBONATE 1600 MG: 800 TABLET, FILM COATED ORAL at 09:18

## 2021-05-15 RX ADMIN — FENTANYL CITRATE 25 MCG: 50 INJECTION, SOLUTION INTRAMUSCULAR; INTRAVENOUS at 10:15

## 2021-05-15 RX ADMIN — CALCIUM ACETATE 667 MG: 667 CAPSULE ORAL at 12:59

## 2021-05-15 RX ADMIN — SODIUM CHLORIDE, PRESERVATIVE FREE 10 ML: 5 INJECTION INTRAVENOUS at 10:09

## 2021-05-15 RX ADMIN — DEXTROSE MONOHYDRATE 12.5 G: 500 INJECTION PARENTERAL at 17:02

## 2021-05-15 RX ADMIN — HYDRALAZINE HYDROCHLORIDE 10 MG: 10 TABLET, FILM COATED ORAL at 20:39

## 2021-05-15 RX ADMIN — POTASSIUM CHLORIDE 20 MEQ: 20 TABLET, EXTENDED RELEASE ORAL at 12:58

## 2021-05-15 RX ADMIN — FENTANYL CITRATE 25 MCG: 50 INJECTION, SOLUTION INTRAMUSCULAR; INTRAVENOUS at 18:09

## 2021-05-15 RX ADMIN — SEVELAMER CARBONATE 1600 MG: 800 TABLET, FILM COATED ORAL at 12:59

## 2021-05-15 RX ADMIN — CALCIUM ACETATE 667 MG: 667 CAPSULE ORAL at 09:18

## 2021-05-15 RX ADMIN — LOPERAMIDE HYDROCHLORIDE 2 MG: 2 CAPSULE ORAL at 14:33

## 2021-05-15 RX ADMIN — ARIPIPRAZOLE 5 MG: 5 TABLET ORAL at 20:56

## 2021-05-15 RX ADMIN — HYDROXYZINE HYDROCHLORIDE 25 MG: 10 TABLET ORAL at 09:19

## 2021-05-15 RX ADMIN — SODIUM CHLORIDE, PRESERVATIVE FREE 10 ML: 5 INJECTION INTRAVENOUS at 20:40

## 2021-05-15 RX ADMIN — METOPROLOL TARTRATE 25 MG: 25 TABLET, FILM COATED ORAL at 09:19

## 2021-05-15 RX ADMIN — HEPARIN SODIUM 5000 UNITS: 5000 INJECTION INTRAVENOUS; SUBCUTANEOUS at 23:09

## 2021-05-15 RX ADMIN — HEPARIN SODIUM 5000 UNITS: 5000 INJECTION INTRAVENOUS; SUBCUTANEOUS at 14:33

## 2021-05-15 RX ADMIN — FENTANYL CITRATE 25 MCG: 50 INJECTION, SOLUTION INTRAMUSCULAR; INTRAVENOUS at 12:57

## 2021-05-15 RX ADMIN — INSULIN LISPRO 1 UNITS: 100 INJECTION, SOLUTION INTRAVENOUS; SUBCUTANEOUS at 09:15

## 2021-05-15 RX ADMIN — ONDANSETRON 4 MG: 2 INJECTION INTRAMUSCULAR; INTRAVENOUS at 17:03

## 2021-05-15 RX ADMIN — INSULIN GLARGINE 3 UNITS: 100 INJECTION, SOLUTION SUBCUTANEOUS at 09:15

## 2021-05-15 RX ADMIN — INSULIN LISPRO 3 UNITS: 100 INJECTION, SOLUTION INTRAVENOUS; SUBCUTANEOUS at 12:58

## 2021-05-15 RX ADMIN — HYDRALAZINE HYDROCHLORIDE 10 MG: 10 TABLET, FILM COATED ORAL at 09:18

## 2021-05-15 ASSESSMENT — PAIN DESCRIPTION - PROGRESSION
CLINICAL_PROGRESSION: NOT CHANGED

## 2021-05-15 ASSESSMENT — PAIN DESCRIPTION - FREQUENCY
FREQUENCY: CONTINUOUS

## 2021-05-15 ASSESSMENT — PAIN SCALES - GENERAL
PAINLEVEL_OUTOF10: 8
PAINLEVEL_OUTOF10: 7
PAINLEVEL_OUTOF10: 8
PAINLEVEL_OUTOF10: 10
PAINLEVEL_OUTOF10: 5
PAINLEVEL_OUTOF10: 7
PAINLEVEL_OUTOF10: 0

## 2021-05-15 ASSESSMENT — PAIN DESCRIPTION - ONSET
ONSET: ON-GOING
ONSET: SUDDEN
ONSET: ON-GOING
ONSET: ON-GOING

## 2021-05-15 ASSESSMENT — PAIN DESCRIPTION - DESCRIPTORS
DESCRIPTORS: ACHING
DESCRIPTORS: ACHING
DESCRIPTORS: CONSTANT;ACHING
DESCRIPTORS: ACHING

## 2021-05-15 ASSESSMENT — PAIN DESCRIPTION - LOCATION
LOCATION: LEG;ABDOMEN
LOCATION: ABDOMEN;LEG
LOCATION: ABDOMEN;LEG
LOCATION: LEG

## 2021-05-15 ASSESSMENT — PAIN DESCRIPTION - ORIENTATION
ORIENTATION: LEFT
ORIENTATION: LEFT;RIGHT
ORIENTATION: LEFT
ORIENTATION: LEFT

## 2021-05-15 ASSESSMENT — PAIN DESCRIPTION - PAIN TYPE
TYPE: ACUTE PAIN
TYPE: CHRONIC PAIN;SURGICAL PAIN
TYPE: ACUTE PAIN
TYPE: CHRONIC PAIN;ACUTE PAIN

## 2021-05-15 NOTE — PLAN OF CARE
Problem: Falls - Risk of:  Goal: Will remain free from falls  Description: Will remain free from falls  5/15/2021 1116 by Sena Mackey RN  Outcome: Ongoing  5/15/2021 0714 by Prasad Ewing RN  Outcome: Met This Shift  Goal: Absence of physical injury  Description: Absence of physical injury  5/15/2021 1116 by Sena Mackey RN  Outcome: Ongoing  5/15/2021 0714 by Prasad Ewing RN  Outcome: Met This Shift     Problem: Pain:  Description: Pain management should include both nonpharmacologic and pharmacologic interventions.   Goal: Pain level will decrease  Description: Pain level will decrease  5/15/2021 1116 by Sena Mackey RN  Outcome: Ongoing  5/15/2021 0714 by Prasad Ewing RN  Outcome: Ongoing  Goal: Control of acute pain  Description: Control of acute pain  5/15/2021 1116 by Sena Mackey RN  Outcome: Ongoing  5/15/2021 0714 by Prasad Ewing RN  Outcome: Ongoing  Goal: Control of chronic pain  Description: Control of chronic pain  5/15/2021 1116 by Sena Mackey RN  Outcome: Ongoing  5/15/2021 0714 by Prasad Ewing RN  Outcome: Ongoing

## 2021-05-15 NOTE — PROGRESS NOTES
CCPD tx completed. No issues noted. VSS. Pt disconnected and stay safe cap applied using strict aseptic technique. 2151 net UF pale clear yellow effluent.

## 2021-05-15 NOTE — PROGRESS NOTES
[address] [place], [place] [address]ist   Progress Note    Admitting Date and Time: 5/14/2021  1:56 PM  Admit Dx: Intractable nausea and vomiting [R11.2]    Subjective/interval history:    Pt seen and examined. RN present at bedside. Today she is awake, alert, oriented x3. Still having severe pain in her left leg. She has been taking oxycodone for this prescribed by her podiatrist, but states that it makes her very drowsy. ROS: denies fever, chills, cp, sob, n/v, HA unless stated above.  ARIPiprazole  5 mg Oral Nightly    Calcium Acetate (Phos Binder)  667 mg Oral TID WC    docusate sodium  100 mg Oral BID    hydrALAZINE  10 mg Oral BID    hydrOXYzine  25 mg Oral Daily    insulin glargine  3 Units Subcutaneous BID    insulin lispro  0-3 Units Subcutaneous Nightly    insulin lispro  0-6 Units Subcutaneous TID WC    levothyroxine  75 mcg Oral Daily    metoprolol tartrate  25 mg Oral Daily    mirtazapine  15 mg Oral Nightly    pantoprazole  40 mg Oral QAM AC    rOPINIRole  0.25 mg Oral Nightly    sevelamer  1,600 mg Oral TID WC    sodium chloride flush  5-40 mL Intravenous 2 times per day    heparin (porcine)  5,000 Units Subcutaneous 3 times per day    metoclopramide  5 mg Oral TID     fentanNYL, 25 mcg, Q2H PRN  albuterol, 2.5 mg, Q6H PRN  glucose, 15 g, PRN  dextrose, 12.5 g, PRN  glucagon (rDNA), 1 mg, PRN  dextrose, 100 mL/hr, PRN  polyethylene glycol, 17 g, Daily PRN  sodium chloride flush, 5-40 mL, PRN  sodium chloride, 25 mL, PRN  promethazine, 12.5 mg, Q6H PRN   Or  ondansetron, 4 mg, Q6H PRN  acetaminophen, 650 mg, Q6H PRN   Or  acetaminophen, 650 mg, Q6H PRN  bisacodyl, 5 mg, Daily PRN  perflutren lipid microspheres, 1.5 mL, ONCE PRN         Objective:    BP (!) 145/99   Pulse 94   Temp 98.1 °F (36.7 °C) (Oral)   Resp 14   Ht 5' 4\" (1.626 m)   Wt 146 lb (66.2 kg)   LMP  (LMP Unknown)   SpO2 99%   BMI 25.06 kg/m²   General Appearance: Somnolent, but awakens to voice. Appears anxious. Skin: warm and dry  Head: normocephalic and atraumatic  Eyes: pupils equal, round, and reactive to light, extraocular eye movements intact, conjunctivae normal  Neck: neck supple and non tender without mass   Pulmonary/Chest: clear to auscultation bilaterally- no wheezes, rales or rhonchi, normal air movement, no respiratory distress  Cardiovascular: normal rate, normal S1 and S2 and no carotid bruits  Abdomen: soft, non-tender, non-distended, normal bowel sounds, no masses or organomegaly  Extremities: Left tibia with ulcerated wound. Dry dressing unwrapped today, no surrounding erythema or induration. Neurologic: no cranial nerve deficit and speech normal      Recent Labs     05/14/21  1426 05/14/21  1746 05/15/21  0706    138 140   K 1.8* 3.5 2.9*   * 101 100   CO2 20* 27 26   BUN 25* 34* 31*   CREATININE 6.3* 9.1* 9.0*   GLUCOSE 177* 119* 186*   CALCIUM 5.7* 9.4 8.9       Recent Labs     05/14/21  1426 05/15/21  0706   ALKPHOS 115*  --    PROT 4.1*  --    LABALBU 1.6* 2.2*   BILITOT <0.2  --    AST 5  --    ALT 6  --        Recent Labs     05/14/21  1426 05/15/21  0706   WBC 6.1 6.4   RBC 3.06* 3.53   HGB 9.1* 10.5*   HCT 28.0* 32.5*   MCV 91.5 92.1   MCH 29.7 29.7   MCHC 32.5 32.3   RDW 13.1 13.3    331   MPV 9.9 10.2           Radiology:   XR TIBIA FIBULA LEFT (2 VIEWS)   Final Result   1. Surgical staples along the medial left shin. Mild soft tissue stranding   in this region. No evidence of subcutaneous air. 2.  No osseous abnormality seen about the left tibia or fibula on this exam.   No definite evidence osseous destruction. XR CHEST PORTABLE   Final Result   1. No evidence of acute cardiopulmonary pathology.       2.  Right internal jugular central venous catheter             Assessment:  Principal Problem:    Hypokalemia  Active Problems:    Hypertension    Uncontrolled type 1 diabetes mellitus with hyperglycemia, with long-term current use of insulin ([facility]    ESRD on peritoneal dialysis ([facility]    Hypomagnesemia    Hypocalcemia    Intractable nausea and vomiting    Wound of left leg, sequela    Syncope  Resolved Problems:    * No resolved hospital problems. *      1. Hypokalemia/hypomagnesemia/hypocalcemia  -Improved yesterday evening and this morning  -Monitor electrolytes and replete as needed     2. Intractable nausea and vomiting  -She had an episode of nausea and vomiting this morning. Electrolyte derangements have improved. Concern for opioid withdrawal; she was taking Percocet regularly at home and ran out. Given she does have a significant left lower extremity wound, will treat with low-dose as needed fentanyl for now.     3. Syncope  -Cardiogram ordered, pending completion  -Telemetry reviewed, and there has been no arrhythmias     4. End-stage renal disease  -Nephrology consult requested for peritoneal dialysis management     5. Type 1 diabetes mellitus  -Continue home basal, prandial, and corrective scale insulin regimen     6. Left lower extremity wound  -Concern for previous infection vs pyoderma gangrenosum  -This has been present since February. At that time, she had surgical debridement was on broad-spectrum antibiotic coverage. She is afebrile, no leukocytosis, and wound does not appear to have any significant rounding erythema. Will consult wound care and monitor for any progression or signs of infection        Code Status: Full code  DVT prophylaxis: Subcutaneous heparin    NOTE: This report was transcribed using voice recognition software. Every effort was made to ensure accuracy; however, inadvertent computerized transcription errors may be present.      Electronically signed by Aiden Santiago DO on 5/15/2021 at 9:42 AM

## 2021-05-15 NOTE — CONSULTS
Department of Internal Medicine  Nephrology Attending Consult Note      Reason for Consult:  End-Stage Renal Disease  Requesting Physician: Dr. 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 She reported having nausea but no fever or chills.       Past Medical History:        Diagnosis Date    Acute congestive heart failure ([address]     BC (acute kidney injury) ([address] 10/1/2019    Cardiac arrest ([address] 2/15/2021    Cephalgia 10/9/2019    Chronic kidney disease     Depression     Diabetes mellitus ([address]     Diabetic gastroparesis associated with type 1 diabetes mellitus ([address] 2018    Diabetic ketoacidosis ([address] 2011    Diabetic ketoacidosis with coma associated with type 1 diabetes mellitus ([address] 2013    Diabetic polyneuropathy associated with type 1 diabetes mellitus ([address] 2020    Drug use complicating pregnancy in third trimester     Endocarditis 10/31/2020    ESRD (end stage renal disease) ([address] 2020    Hemodialysis patient ([address]     History of blood transfusion 2019    Hyperlipidemia 10/8/2020    Hyperosmolar hyperglycemic state (HHS) ([address] 2020    Hypothyroidism 10/8/2020    Iron deficiency anemia 10/1/2019    MDRO (multiple drug resistant organisms) resistance     MRSA (methicillin resistant Staphylococcus aureus)     back wound abcess    Non compliance w medication regimen 3/30/2016    Other disorders of kidney and ureter     Pregnancy 3/30/2016    16 weeks    Previous  delivery affecting pregnancy, antepartum 3/2/2017    Previous stillbirth or demise, antepartum 2/8/2016    Seizure ([address] 11/20/2020    Severe pre-eclampsia in third trimester 8/22/2016    Shock liver 2/15/2021    Valvular endocarditis 11/10/2020    This Diagnosis was added to the Problem List based on transcribed orders from Dr. Sandhya Dc        Past Surgical History:        Procedure Laterality Date    BACK SURGERY      abscess   [address]      x2    CHOLECYSTECTOMY, LAPAROSCOPIC N/A 11/7/2019    CHOLECYSTECTOMY LAPAROSCOPIC performed by Kameron Bolivar MD at [address] [facility] N/A 6/25/2018    COLONOSCOPY WITH BIOPSY performed by Kasia Vasquez MD at [address] N/A 12/18/2018    COLONOSCOPY WITH BIOPSY performed by Norberto Nesbitt MD at [address]  1/31/2012    EF 57%    ECHO COMPL W DOP COLOR FLOW  6/10/2013         EMBOLECTOMY N/A 11/6/2020    [address] [place] [address], [id number] [place] [address], YULISSA -- REQS ROOM 3 performed by Gilford Ivans, MD at [address] [facility] Left 2/23/2021    LEFT LEG INCISION AND DRAINAGE, DEBRIDEMENT, WOUND VAC APPLICATION performed by Scarlet Spear DPM at [place] [address] N/A 6/26/2020    LAPAROSCOPIC INSERTION PERITONEAL DIALYSIS CATHETER performed by Diego Galeas MD at [facility] [address] ESOPHAGOGASTRODUODENOSCOPY TRANSORAL DIAGNOSTIC N/A 5/30/2018    EGD ESOPHAGOGASTRODUODENOSCOPY performed by Kasia Vasquez MD at [id number] [place] [address] TRANSESOPHAGEAL ECHOCARDIOGRAM N/A 10/19/2020    TRANSESOPHAGEAL ECHOCARDIOGRAM WITH BUBBLE STUDY performed by Shasha Mcghee MD at [address] [facility] [address] [address]  04/2018    UPPER GASTROINTESTINAL ENDOSCOPY  12/18/2018    EGD BIOPSY performed by Norberto Nesbitt MD at [address] 10/11/2019    EGD ESOPHAGOGASTRODUODENOSCOPY performed by Zelia Cranker, DO at [facility] 23     Current Medications: Current Facility-Administered Medications: fentaNYL (SUBLIMAZE) injection 25 mcg, 25 mcg, Intravenous, Q1H PRN  loperamide (IMODIUM) capsule 2 mg, 2 mg, Oral, 4x Daily PRN  albuterol (PROVENTIL) nebulizer solution 2.5 mg, 2.5 mg, Nebulization, Q6H PRN  ARIPiprazole (ABILIFY) tablet 5 mg, 5 mg, Oral, Nightly  glucose (GLUTOSE) 40 % oral gel 15 g, 15 g, Oral, PRN  dextrose 50 % IV solution, 12.5 g, Intravenous, PRN  glucagon (rDNA) injection 1 mg, 1 mg, Intramuscular, PRN  dextrose 5 % solution, 100 mL/hr, Intravenous, PRN  Calcium Acetate (Phos Binder) CAPS 667 mg, 667 mg, Oral, TID WC  docusate sodium (COLACE) capsule 100 mg, 100 mg, Oral, BID  hydrALAZINE (APRESOLINE) tablet 10 mg, 10 mg, Oral, BID  hydrOXYzine (ATARAX) tablet 25 mg, 25 mg, Oral, Daily  insulin glargine (LANTUS) injection vial 3 Units, 3 Units, Subcutaneous, BID  insulin lispro (HUMALOG) injection vial 0-3 Units, 0-3 Units, Subcutaneous, Nightly  insulin lispro (HUMALOG) injection vial 0-6 Units, 0-6 Units, Subcutaneous, TID WC  levothyroxine (SYNTHROID) tablet 75 mcg, 75 mcg, Oral, Daily  metoprolol tartrate (LOPRESSOR) tablet 25 mg, 25 mg, Oral, Daily  mirtazapine (REMERON) tablet 15 mg, 15 mg, Oral, Nightly  pantoprazole (PROTONIX) tablet 40 mg, 40 mg, Oral, QAM AC  polyethylene glycol (GLYCOLAX) packet 17 g, 17 g, Oral, Daily PRN  rOPINIRole (REQUIP) tablet 0.25 mg, 0.25 mg, Oral, Nightly  sevelamer (RENVELA) tablet 1,600 mg, 1,600 mg, Oral, TID WC  sodium chloride flush 0.9 % injection 5-40 mL, 5-40 mL, Intravenous, 2 times per day  sodium chloride flush 0.9 % injection 5-40 mL, 5-40 mL, Intravenous, PRN  0.9 % sodium chloride infusion, 25 mL, Intravenous, PRN  heparin (porcine) injection 5,000 Units, 5,000 Units, Subcutaneous, 3 times per day  promethazine (PHENERGAN) tablet 12.5 mg, 12.5 mg, Oral, Q6H PRN **OR** ondansetron (ZOFRAN) injection 4 mg, 4 mg, Intravenous, Q6H PRN  acetaminophen (TYLENOL) tablet 650 mg, 650 mg, Oral, Q6H PRN **OR** acetaminophen (TYLENOL) suppository 650 mg, 650 mg, Rectal, Q6H PRN  bisacodyl (DULCOLAX) EC tablet 5 mg, 5 mg, Oral, Daily PRN  perflutren lipid microspheres (DEFINITY) injection 1.65 mg, 1.5 mL, Intravenous, ONCE PRN  Allergies:  Cefepime and Toradol [ketorolac tromethamine]    Social History:    TOBACCO:   reports that she quit smoking about 3 months ago. Her smoking use included cigarettes. She has a 3.00 pack-year smoking history. She has never used smokeless tobacco.  ETOH:   reports no history of alcohol use.     Family History:       Problem Relation Age of Onset   [facility] Asthma Mother     Hypertension Mother     High Blood Pressure Mother     Diabetes Mother     Asthma Brother     High Blood Pressure Father      REVIEW OF SYSTEMS:      CONSTITUTIONAL:  negative for  fevers and chills  EYES:  negative  HEENT:  negative for  hearing loss and epistaxis  RESPIRATORY:  negative  CARDIOVASCULAR:  negative for  chest pain  GASTROINTESTINAL:  negative for diarrhea  GENITOURINARY:  negative  INTEGUMENT/BREAST:  negative  HEMATOLOGIC/LYMPHATIC:  negative  ALLERGIC/IMMUNOLOGIC:  positive for drug reactions  ENDOCRINE:  negative  MUSCULOSKELETAL:  negative  NEUROLOGICAL:  negative for seizures    PHYSICAL EXAM:      Vitals:    VITALS:  /80   Pulse 92   Temp 98.7 °F (37.1 °C) (Oral)   Resp 13   Ht 5' 4\" (1.626 m)   Wt 146 lb (66.2 kg)   LMP  (LMP Unknown)   SpO2 99%   BMI 25.06 kg/m²   24HR INTAKE/OUTPUT:    Intake/Output Summary (Last 24 hours) at 5/15/2021 1831  Last data filed at 5/15/2021 1436  Gross per 24 hour   Intake 510 ml   Output --   Net 510 ml     PD Catheter Exam:  PD catheter exit site clean    Constitutional: Awake in no acute distress  HEENT:  Normocephalic, PERRL  Respiratory: Decreased breath sounds on the basis  Cardiovascular/Edema:  RRR, S1/S2  Gastrointestinal:  Soft, PD catheter exit site clean   Neurologic:  Nonfocal, AVELAR  Skin:  Warm, dry, no lesions  Other:  +edema      DATA:    CBC:   Lab Results   Component Value Date    WBC 6.4 05/15/2021    RBC 3.53 05/15/2021    HGB 10.5 05/15/2021    HCT 32.5 05/15/2021    MCV 92.1 05/15/2021    MCH 29.7 05/15/2021    MCHC 32.3 05/15/2021    RDW 13.3 05/15/2021     05/15/2021    MPV 10.2 05/15/2021     CMP:    Lab Results   Component Value Date     05/15/2021    K 2.9 05/15/2021    K 1.8 05/14/2021     05/15/2021    CO2 26 05/15/2021    BUN 31 05/15/2021    CREATININE 9.0 05/15/2021    GFRAA 6 05/15/2021    LABGLOM 6 05/15/2021    GLUCOSE 186 05/15/2021    GLUCOSE 130 05/18/2012    PROT 4.1 05/14/2021    LABALBU 2.2 05/15/2021    LABALBU 4.1 05/18/2012    CALCIUM 8.9 05/15/2021    BILITOT <0.2 05/14/2021    ALKPHOS 115 05/14/2021    AST 5 05/14/2021    ALT 6 05/14/2021     Magnesium:    Lab Results   Component Value Date    MG 2.4 05/15/2021     Phosphorus:    Lab Results   Component Value Date    PHOS 4.2 05/15/2021     Radiology Review:      Chest x-ray May 14, 2021   1.  No evidence of acute cardiopulmonary pathology.       2.  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 She reported having nausea but no fever or chills. 1. ESRD on PD, continue CCPD  2. Altered mental status, resolved  3. HTN, on hydralazine, metoprolol  4. MBD of CKD, on sevelamer  5.  Anemia of CKD, on calcium acetate 667 mg 3 times daily with meals    Plan:    · Continue CCPD, 2 L exchanges x4/9 hours with last fill of 2 L, all 2.5%  · Replace potassium, 20 mEq KCl p.o. daily  · Monitor potassium level    Thank you very much Dr. Marco Antonio Up for allowing us to participate in the care of Mrs. Eda Huffman.

## 2021-05-16 LAB
ALBUMIN SERPL-MCNC: 2.2 G/DL (ref 3.5–5.2)
ANAEROBIC CULTURE: NORMAL
ANION GAP SERPL CALCULATED.3IONS-SCNC: 16 MMOL/L (ref 7–16)
BACTERIA: ABNORMAL /HPF
BILIRUBIN URINE: ABNORMAL
BLOOD, URINE: ABNORMAL
BUN BLDV-MCNC: 30 MG/DL (ref 6–20)
CALCIUM SERPL-MCNC: 8.5 MG/DL (ref 8.6–10.2)
CHLORIDE BLD-SCNC: 99 MMOL/L (ref 98–107)
CLARITY: ABNORMAL
CO2: 26 MMOL/L (ref 22–29)
COLOR: ABNORMAL
CREAT SERPL-MCNC: 8.7 MG/DL (ref 0.5–1)
GFR AFRICAN AMERICAN: 7
GFR NON-AFRICAN AMERICAN: 7 ML/MIN/1.73
GLUCOSE BLD-MCNC: 258 MG/DL (ref 74–99)
GLUCOSE URINE: 100 MG/DL
HCG QUALITATIVE: NEGATIVE
HCT VFR BLD CALC: 32.1 % (ref 34–48)
HEMOGLOBIN: 10.1 G/DL (ref 11.5–15.5)
KETONES, URINE: ABNORMAL MG/DL
LEUKOCYTE ESTERASE, URINE: ABNORMAL
MAGNESIUM: 2.1 MG/DL (ref 1.6–2.6)
MCH RBC QN AUTO: 29 PG (ref 26–35)
MCHC RBC AUTO-ENTMCNC: 31.5 % (ref 32–34.5)
MCV RBC AUTO: 92.2 FL (ref 80–99.9)
METER GLUCOSE: 100 MG/DL (ref 74–99)
METER GLUCOSE: 190 MG/DL (ref 74–99)
METER GLUCOSE: 201 MG/DL (ref 74–99)
METER GLUCOSE: 244 MG/DL (ref 74–99)
METER GLUCOSE: 299 MG/DL (ref 74–99)
NITRITE, URINE: POSITIVE
ORGANISM: ABNORMAL
PDW BLD-RTO: 13.3 FL (ref 11.5–15)
PH UA: 6 (ref 5–9)
PHOSPHORUS: 3.9 MG/DL (ref 2.5–4.5)
PLATELET # BLD: 282 E9/L (ref 130–450)
PMV BLD AUTO: 10 FL (ref 7–12)
POTASSIUM SERPL-SCNC: 3 MMOL/L (ref 3.5–5)
PROTEIN UA: >=300 MG/DL
RBC # BLD: 3.48 E12/L (ref 3.5–5.5)
RBC UA: ABNORMAL /HPF (ref 0–2)
SODIUM BLD-SCNC: 141 MMOL/L (ref 132–146)
SPECIFIC GRAVITY UA: 1.02 (ref 1–1.03)
UROBILINOGEN, URINE: 0.2 E.U./DL
WBC # BLD: 5.3 E9/L (ref 4.5–11.5)
WBC UA: ABNORMAL /HPF (ref 0–5)
WOUND/ABSCESS: ABNORMAL

## 2021-05-16 PROCEDURE — 96367 TX/PROPH/DG ADDL SEQ IV INF: CPT

## 2021-05-16 PROCEDURE — 6370000000 HC RX 637 (ALT 250 FOR IP): Performed by: INTERNAL MEDICINE

## 2021-05-16 PROCEDURE — 99232 SBSQ HOSP IP/OBS MODERATE 35: CPT | Performed by: INTERNAL MEDICINE

## 2021-05-16 PROCEDURE — 96376 TX/PRO/DX INJ SAME DRUG ADON: CPT

## 2021-05-16 PROCEDURE — 82962 GLUCOSE BLOOD TEST: CPT

## 2021-05-16 PROCEDURE — 51798 US URINE CAPACITY MEASURE: CPT

## 2021-05-16 PROCEDURE — 81001 URINALYSIS AUTO W/SCOPE: CPT

## 2021-05-16 PROCEDURE — 6360000002 HC RX W HCPCS: Performed by: INTERNAL MEDICINE

## 2021-05-16 PROCEDURE — 83735 ASSAY OF MAGNESIUM: CPT

## 2021-05-16 PROCEDURE — 87088 URINE BACTERIA CULTURE: CPT

## 2021-05-16 PROCEDURE — 2060000000 HC ICU INTERMEDIATE R&B

## 2021-05-16 PROCEDURE — 85027 COMPLETE CBC AUTOMATED: CPT

## 2021-05-16 PROCEDURE — 90945 DIALYSIS ONE EVALUATION: CPT

## 2021-05-16 PROCEDURE — 96372 THER/PROPH/DIAG INJ SC/IM: CPT

## 2021-05-16 PROCEDURE — 2500000003 HC RX 250 WO HCPCS: Performed by: INTERNAL MEDICINE

## 2021-05-16 PROCEDURE — 84703 CHORIONIC GONADOTROPIN ASSAY: CPT

## 2021-05-16 PROCEDURE — 80069 RENAL FUNCTION PANEL: CPT

## 2021-05-16 PROCEDURE — 36415 COLL VENOUS BLD VENIPUNCTURE: CPT

## 2021-05-16 PROCEDURE — 96366 THER/PROPH/DIAG IV INF ADDON: CPT

## 2021-05-16 PROCEDURE — 87070 CULTURE OTHR SPECIMN AEROBIC: CPT

## 2021-05-16 PROCEDURE — 96361 HYDRATE IV INFUSION ADD-ON: CPT

## 2021-05-16 PROCEDURE — 2580000003 HC RX 258: Performed by: INTERNAL MEDICINE

## 2021-05-16 RX ORDER — SODIUM CHLORIDE 9 MG/ML
INJECTION, SOLUTION INTRAVENOUS EVERY 12 HOURS
Status: DISCONTINUED | OUTPATIENT
Start: 2021-05-16 | End: 2021-05-18

## 2021-05-16 RX ORDER — OXYCODONE HYDROCHLORIDE 5 MG/1
5 TABLET ORAL EVERY 4 HOURS PRN
Status: DISCONTINUED | OUTPATIENT
Start: 2021-05-16 | End: 2021-05-20 | Stop reason: HOSPADM

## 2021-05-16 RX ORDER — OXYCODONE HYDROCHLORIDE 5 MG/1
2.5 TABLET ORAL EVERY 4 HOURS PRN
Status: DISCONTINUED | OUTPATIENT
Start: 2021-05-16 | End: 2021-05-20 | Stop reason: HOSPADM

## 2021-05-16 RX ORDER — FENTANYL CITRATE 50 UG/ML
50 INJECTION, SOLUTION INTRAMUSCULAR; INTRAVENOUS
Status: DISCONTINUED | OUTPATIENT
Start: 2021-05-16 | End: 2021-05-20 | Stop reason: HOSPADM

## 2021-05-16 RX ORDER — DICYCLOMINE HYDROCHLORIDE 10 MG/1
10 CAPSULE ORAL 3 TIMES DAILY PRN
Status: DISCONTINUED | OUTPATIENT
Start: 2021-05-16 | End: 2021-05-20 | Stop reason: HOSPADM

## 2021-05-16 RX ORDER — PROCHLORPERAZINE EDISYLATE 5 MG/ML
10 INJECTION INTRAMUSCULAR; INTRAVENOUS EVERY 6 HOURS PRN
Status: DISCONTINUED | OUTPATIENT
Start: 2021-05-16 | End: 2021-05-20 | Stop reason: HOSPADM

## 2021-05-16 RX ORDER — POTASSIUM CHLORIDE 20 MEQ/1
20 TABLET, EXTENDED RELEASE ORAL ONCE
Status: COMPLETED | OUTPATIENT
Start: 2021-05-16 | End: 2021-05-16

## 2021-05-16 RX ADMIN — SEVELAMER CARBONATE 1600 MG: 800 TABLET, FILM COATED ORAL at 09:30

## 2021-05-16 RX ADMIN — POTASSIUM CHLORIDE 20 MEQ: 20 TABLET, EXTENDED RELEASE ORAL at 11:01

## 2021-05-16 RX ADMIN — ONDANSETRON 4 MG: 2 INJECTION INTRAMUSCULAR; INTRAVENOUS at 13:33

## 2021-05-16 RX ADMIN — INSULIN LISPRO 3 UNITS: 100 INJECTION, SOLUTION INTRAVENOUS; SUBCUTANEOUS at 09:30

## 2021-05-16 RX ADMIN — FENTANYL CITRATE 50 MCG: 50 INJECTION INTRAMUSCULAR; INTRAVENOUS at 14:45

## 2021-05-16 RX ADMIN — PROMETHAZINE HYDROCHLORIDE 12.5 MG: 25 TABLET ORAL at 07:57

## 2021-05-16 RX ADMIN — HEPARIN SODIUM 5000 UNITS: 5000 INJECTION INTRAVENOUS; SUBCUTANEOUS at 15:51

## 2021-05-16 RX ADMIN — OXYCODONE 5 MG: 5 TABLET ORAL at 09:39

## 2021-05-16 RX ADMIN — MIRTAZAPINE 15 MG: 15 TABLET, FILM COATED ORAL at 20:16

## 2021-05-16 RX ADMIN — CALCIUM ACETATE 667 MG: 667 CAPSULE ORAL at 18:14

## 2021-05-16 RX ADMIN — SODIUM CHLORIDE: 9 INJECTION, SOLUTION INTRAVENOUS at 23:11

## 2021-05-16 RX ADMIN — CALCIUM ACETATE 667 MG: 667 CAPSULE ORAL at 09:30

## 2021-05-16 RX ADMIN — VANCOMYCIN HYDROCHLORIDE 1000 MG: 1 INJECTION, POWDER, LYOPHILIZED, FOR SOLUTION INTRAVENOUS at 17:20

## 2021-05-16 RX ADMIN — METOPROLOL TARTRATE 25 MG: 25 TABLET, FILM COATED ORAL at 09:29

## 2021-05-16 RX ADMIN — PIPERACILLIN AND TAZOBACTAM 3375 MG: 3; .375 INJECTION, POWDER, FOR SOLUTION INTRAVENOUS at 20:17

## 2021-05-16 RX ADMIN — OXYCODONE 5 MG: 5 TABLET ORAL at 13:14

## 2021-05-16 RX ADMIN — INSULIN GLARGINE 3 UNITS: 100 INJECTION, SOLUTION SUBCUTANEOUS at 20:23

## 2021-05-16 RX ADMIN — DICYCLOMINE HYDROCHLORIDE 10 MG: 10 CAPSULE ORAL at 11:01

## 2021-05-16 RX ADMIN — LEVOTHYROXINE SODIUM 75 MCG: 75 TABLET ORAL at 05:43

## 2021-05-16 RX ADMIN — PANTOPRAZOLE SODIUM 40 MG: 40 TABLET, DELAYED RELEASE ORAL at 05:43

## 2021-05-16 RX ADMIN — HEPARIN SODIUM 5000 UNITS: 5000 INJECTION INTRAVENOUS; SUBCUTANEOUS at 23:01

## 2021-05-16 RX ADMIN — HYDRALAZINE HYDROCHLORIDE 10 MG: 10 TABLET, FILM COATED ORAL at 09:30

## 2021-05-16 RX ADMIN — INSULIN GLARGINE 3 UNITS: 100 INJECTION, SOLUTION SUBCUTANEOUS at 09:35

## 2021-05-16 RX ADMIN — SEVELAMER CARBONATE 1600 MG: 800 TABLET, FILM COATED ORAL at 18:13

## 2021-05-16 RX ADMIN — HYDROXYZINE HYDROCHLORIDE 25 MG: 10 TABLET ORAL at 09:29

## 2021-05-16 RX ADMIN — OXYCODONE 2.5 MG: 5 TABLET ORAL at 23:10

## 2021-05-16 RX ADMIN — HYDRALAZINE HYDROCHLORIDE 10 MG: 10 TABLET, FILM COATED ORAL at 20:19

## 2021-05-16 RX ADMIN — ARIPIPRAZOLE 5 MG: 5 TABLET ORAL at 20:16

## 2021-05-16 RX ADMIN — SODIUM CHLORIDE, PRESERVATIVE FREE 10 ML: 5 INJECTION INTRAVENOUS at 20:16

## 2021-05-16 RX ADMIN — OXYCODONE 5 MG: 5 TABLET ORAL at 18:13

## 2021-05-16 RX ADMIN — HEPARIN SODIUM 5000 UNITS: 5000 INJECTION INTRAVENOUS; SUBCUTANEOUS at 05:43

## 2021-05-16 RX ADMIN — SODIUM CHLORIDE, PRESERVATIVE FREE 10 ML: 5 INJECTION INTRAVENOUS at 14:43

## 2021-05-16 RX ADMIN — ROPINIROLE HYDROCHLORIDE 0.25 MG: 0.25 TABLET, FILM COATED ORAL at 20:16

## 2021-05-16 RX ADMIN — INSULIN LISPRO 2 UNITS: 100 INJECTION, SOLUTION INTRAVENOUS; SUBCUTANEOUS at 13:15

## 2021-05-16 RX ADMIN — FENTANYL CITRATE 25 MCG: 50 INJECTION, SOLUTION INTRAMUSCULAR; INTRAVENOUS at 01:23

## 2021-05-16 RX ADMIN — FENTANYL CITRATE 25 MCG: 50 INJECTION, SOLUTION INTRAMUSCULAR; INTRAVENOUS at 07:57

## 2021-05-16 RX ADMIN — POTASSIUM CHLORIDE 20 MEQ: 20 TABLET, EXTENDED RELEASE ORAL at 09:30

## 2021-05-16 ASSESSMENT — PAIN SCALES - GENERAL
PAINLEVEL_OUTOF10: 9
PAINLEVEL_OUTOF10: 10
PAINLEVEL_OUTOF10: 8
PAINLEVEL_OUTOF10: 7
PAINLEVEL_OUTOF10: 10
PAINLEVEL_OUTOF10: 9
PAINLEVEL_OUTOF10: 8
PAINLEVEL_OUTOF10: 8
PAINLEVEL_OUTOF10: 6
PAINLEVEL_OUTOF10: 6

## 2021-05-16 ASSESSMENT — PAIN DESCRIPTION - LOCATION
LOCATION: ABDOMEN;LEG
LOCATION: LEG

## 2021-05-16 ASSESSMENT — PAIN DESCRIPTION - ORIENTATION
ORIENTATION: LEFT

## 2021-05-16 ASSESSMENT — PAIN DESCRIPTION - PAIN TYPE
TYPE: ACUTE PAIN

## 2021-05-16 ASSESSMENT — PAIN DESCRIPTION - ONSET
ONSET: GRADUAL
ONSET: ON-GOING
ONSET: GRADUAL

## 2021-05-16 ASSESSMENT — PAIN DESCRIPTION - FREQUENCY
FREQUENCY: CONTINUOUS

## 2021-05-16 ASSESSMENT — PAIN DESCRIPTION - DESCRIPTORS
DESCRIPTORS: ACHING

## 2021-05-16 ASSESSMENT — PAIN DESCRIPTION - PROGRESSION
CLINICAL_PROGRESSION: NOT CHANGED
CLINICAL_PROGRESSION: NOT CHANGED

## 2021-05-16 NOTE — PROGRESS NOTES
peritoneal dialysis schedule      Thank you for the consult,    Don Han, PharmD 5/16/2021 3:15 PM   [phone]

## 2021-05-16 NOTE — PROGRESS NOTES
pathology.       2.  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 She reported having nausea but no fever or chills. Problems resolved:    · Altered mental status, resolved    IMPRESSION/RECOMMENDATIONS:      1. ESRD on PD, continue CCPD  2. Hypokalemia, 2/2 to K removal with PD   3. HTN, on hydralazine, metoprolol  4. MBD of CKD, on sevelamer  5.  Anemia of CKD, on calcium acetate 667 mg 3 times daily with meals    Plan:    · Continue CCPD, 2 L exchanges x4/9 hours with last fill of 2 L, 1.5% at night and 2.5% long dwell  · Replace potassium, 20 mEq KCl p.o. daily, additional 20 mEq today   · Continue to monitor potassium level

## 2021-05-16 NOTE — PROGRESS NOTES
sbp 98 pt receiving pain medication, has had diarhea and decreased intake. Dr. Goodrich updated. Order changed to all 1.5% dextrose solution with a last fill of 2.5%. ccpd initiated per p/p and order without difficulty. Pd cath dressing changed.  No signs of infection

## 2021-05-16 NOTE — CONSULTS
[address] [facility]  Consult Note    [address], [address] [place] [address]  Phone [phone]   [phone]      Admit Date: 2021  1:56 PM  Pt Name: Maurice Dean  MRN: [id number]  : 1990  Reason for Consult:    Chief Complaint   Patient presents with    Altered Mental Status     fatigue, progressive weakness today, EMS called by boyfriend, patient responsive to strong verbal stimulus at present     Requesting Physician:  Araceli Stoner DO  PCP: Karen Martinez MD  History Obtained From:  patient, chart   ID consulted for Intractable nausea and vomiting [R11.2]  Hypokalemia [E87.6]  on hospital day 1  CHIEF COMPLAINT       Chief Complaint   Patient presents with    Altered Mental Status     fatigue, progressive weakness today, EMS called by boyfriend, patient responsive to strong verbal stimulus at present     [address] [facility] Damaso is a 31 y.o. female who presents with significant past medical history of  has a past medical history of Acute congestive heart failure ([address] BC (acute kidney injury) ([address] Cardiac arrest ([address] Cephalgia, Chronic kidney disease, Depression, Diabetes mellitus ([address] Diabetic gastroparesis associated with type 1 diabetes mellitus ([address] Diabetic ketoacidosis ([address] Diabetic ketoacidosis with coma associated with type 1 diabetes mellitus ([address] Diabetic polyneuropathy associated with type 1 diabetes mellitus ([address] Drug use complicating pregnancy in third trimester, Endocarditis, ESRD (end stage renal disease) ([address] Hemodialysis patient ([address] History of blood transfusion, Hyperlipidemia, Hyperosmolar hyperglycemic state (HHS) ([address] Hypothyroidism, Iron deficiency anemia, MDRO (multiple drug resistant organisms) resistance, MRSA (methicillin resistant Staphylococcus aureus), Non compliance w medication regimen, Other disorders of kidney and ureter, Pregnancy, Previous  delivery affecting pregnancy, antepartum, Previous stillbirth or demise, antepartum, Seizure ([facility] Severe pre-eclampsia in third trimester, Shock liver, and Valvular endocarditis. ED TRIAGEVITALS  BP: 98/62, Temp: 98.1 °F (36.7 °C), Pulse: 100, Resp: 11, SpO2: 99 %  HPI  PT CAME IN DUE TO ALTERED MS/NAUSEA/SOB/FATIGUE  SHE HAS BEEN PASSING OUT   SHE CAME IN WITH A LEFT WOUND POA AND SHE HAS BEEN FOLLOWED BY PODIATRY  SHE HAS A GRAFT IN P;ACE WITH STAPLES LEFT   FOR HER LEFT WOUND SINCE 2/2021  SHE IS S/P ON 2/23  1.  Incision and drainage of left leg abscess. 2.  Excisional debridement of left leg wound to and through level of  deep fascia and muscle, total measurement is 7 cm x 4.5 cm x 0.5 cm in  dimension. 3.  Application of wound VAC negative pressure therapy set at 125 mmHg  Continuous. PATH Skin and subcutaneous tissue, left leg, debridement: Necrosis of skin and   subcutaneous tissue with associated acute inflammation and stromal   hemorrhage.    PT WAS D/C TO [facility] AT  THAT TIME  ID WAS ASKED TO SEE FOR ATBX MX  HER CURRENT WOUND CX MRSA  SHE HAS PAIN   SHE RECEIVED VANCO/PIPTAZO  PER NURSE HAS STRAWBERRY COLOR URINE  REVIEW OF SYSTEMS       REVIEW OFSYSTEMS:    CONSTITUTIONAL:   No fever, chills, weight loss  ALLERGIES:    No urticaria, hay fever,    EYES:     No blurry vision, loss of vision,eye pain  ENT:      No hearing loss, sore throat  CARDIOVASCULAR:  No chest pain or palpitations  RESPIRATORY:   No cough, sob  ENDOCRINE:    No increase thirst, urination   HEME-LYMPH:   No easy bruising or bleeding  GI:     No nausea, vomiting or diarrhea  :     No urinary complaints MIN URINE   NEURO:    No seizures, stroke, HA  MUSCULOSKELETAL:  No muscle aches or pain, no joint pain  SKIN:     No rash or itch  PSYCH:    No depression or anxiety    Medications Prior to Admission: mirtazapine (REMERON) 15 MG tablet, Take 15 mg by mouth nightly  insulin lispro (HUMALOG) 100 UNIT/ML injection vial, Inject 0-3 Units into (with meals)  blood glucose test strips (CONTOUR NEXT TEST) strip, 1 each by In Vitro route 5 times daily As needed.   metoclopramide (REGLAN) 5 MG tablet, Take 5 mg by mouth 3 times daily   CURRENT MEDICATIONS     Current Facility-Administered Medications:     oxyCODONE (ROXICODONE) immediate release tablet 2.5 mg, 2.5 mg, Oral, Q4H PRN **OR** oxyCODONE (ROXICODONE) immediate release tablet 5 mg, 5 mg, Oral, Q4H PRN, Evin Lopez DO, 5 mg at 05/16/21 1813    fentaNYL (SUBLIMAZE) injection 50 mcg, 50 mcg, Intravenous, Q2H PRN, Evin Lopez DO, 50 mcg at 05/16/21 1445    dicyclomine (BENTYL) capsule 10 mg, 10 mg, Oral, TID PRN, Angelo Lopez DO, 10 mg at 05/16/21 1101    prochlorperazine (COMPAZINE) injection 10 mg, 10 mg, Intravenous, Q6H PRN, Grayson Saleem DO    vancomycin (VANCOCIN) intermittent dosing (placeholder), , Other, RX Placeholder, Grayson Saleem DO, Given at 05/16/21 1721    piperacillin-tazobactam (ZOSYN) 3,375 mg in dextrose 5 % 50 mL IVPB extended infusion (mini-bag), 3,375 mg, Intravenous, Q12H **AND** 0.9 % sodium chloride infusion, , Intravenous, Q12H, Evin Lopez DO    loperamide (IMODIUM) capsule 2 mg, 2 mg, Oral, 4x Daily PRN, Angelo Lopez DO, 2 mg at 05/15/21 2037    potassium chloride (KLOR-CON M) extended release tablet 20 mEq, 20 mEq, Oral, Daily with breakfast, Joe Worley MD, 20 mEq at 05/16/21 0930    albuterol (PROVENTIL) nebulizer solution 2.5 mg, 2.5 mg, Nebulization, Q6H PRN, Angelo Lopez DO    ARIPiprazole (ABILIFY) tablet 5 mg, 5 mg, Oral, Nightly, Evin Lopez DO, 5 mg at 05/15/21 2056    glucose (GLUTOSE) 40 % oral gel 15 g, 15 g, Oral, PRN, Angelo Lopez DO    dextrose 50 % IV solution, 12.5 g, Intravenous, PRN, Evin Lopez DO, 12.5 g at 05/15/21 1702    glucagon (rDNA) injection 1 mg, 1 mg, Intramuscular, PRN, Angelo Lopez DO    dextrose 5 % solution, 100 mL/hr, Intravenous, PRN, Angelo Bell DO Jessica    Calcium Acetate (Phos Binder) CAPS 667 mg, 667 mg, Oral, TID WC, Evin Lopez DO, 667 mg at 05/16/21 1814    docusate sodium (COLACE) capsule 100 mg, 100 mg, Oral, BID, Evin Lopez DO, 100 mg at 05/14/21 2206    hydrALAZINE (APRESOLINE) tablet 10 mg, 10 mg, Oral, BID, Evin Lopez DO, 10 mg at 05/16/21 0930    hydrOXYzine (ATARAX) tablet 25 mg, 25 mg, Oral, Daily, Evin Lopez DO, 25 mg at 05/16/21 0929    insulin glargine (LANTUS) injection vial 3 Units, 3 Units, Subcutaneous, BID, Evin Lopez DO, 3 Units at 05/16/21 0935    insulin lispro (HUMALOG) injection vial 0-3 Units, 0-3 Units, Subcutaneous, Nightly, Evin Lopez DO    insulin lispro (HUMALOG) injection vial 0-6 Units, 0-6 Units, Subcutaneous, TID WC, Evin Lopez DO, 3 Units at 05/16/21 0930    levothyroxine (SYNTHROID) tablet 75 mcg, 75 mcg, Oral, Daily, Evin Lopez DO, 75 mcg at 05/16/21 0543    metoprolol tartrate (LOPRESSOR) tablet 25 mg, 25 mg, Oral, Daily, Evin Lopez DO, 25 mg at 05/16/21 0929    mirtazapine (REMERON) tablet 15 mg, 15 mg, Oral, Nightly, Evin Lopez DO, 15 mg at 05/15/21 2038    pantoprazole (PROTONIX) tablet 40 mg, 40 mg, Oral, QAM AC, Evin Lopez DO, 40 mg at 05/16/21 0543    polyethylene glycol (GLYCOLAX) packet 17 g, 17 g, Oral, Daily PRN, [place] Solomon Lopez DO    rOPINIRole (REQUIP) tablet 0.25 mg, 0.25 mg, Oral, Nightly, Evin Lopez DO, 0.25 mg at 05/15/21 2038    sevelamer (RENVELA) tablet 1,600 mg, 1,600 mg, Oral, TID WC, Evin Lopez DO, 1,600 mg at 05/16/21 1813    sodium chloride flush 0.9 % injection 5-40 mL, 5-40 mL, Intravenous, 2 times per day, Evin Lopez DO, 10 mL at 05/15/21 2040    sodium chloride flush 0.9 % injection 5-40 mL, 5-40 mL, Intravenous, PRN, Evin Lopez DO, 10 mL at 05/16/21 1443    0.9 % sodium chloride infusion, 25 mL, Intravenous, PRN, Cornel Gabriel DO    heparin (porcine) injection 5,000 Units, 5,000 Units, Subcutaneous, 3 times per day, Nazanin Conrad DO, 5,000 Units at 05/16/21 1551    promethazine (PHENERGAN) tablet 12.5 mg, 12.5 mg, Oral, Q6H PRN, 12.5 mg at 05/16/21 0757 **OR** ondansetron (ZOFRAN) injection 4 mg, 4 mg, Intravenous, Q6H PRN, Evin Lopez DO, 4 mg at 05/16/21 1333    acetaminophen (TYLENOL) tablet 650 mg, 650 mg, Oral, Q6H PRN, 650 mg at 05/15/21 0919 **OR** acetaminophen (TYLENOL) suppository 650 mg, 650 mg, Rectal, Q6H PRN, Wesley Lopez DO    bisacodyl (DULCOLAX) EC tablet 5 mg, 5 mg, Oral, Daily PRN, Wesley Lopez DO    perflutren lipid microspheres (DEFINITY) injection 1.65 mg, 1.5 mL, Intravenous, ONCE PRN, Evin Lopez DO  ALLERGIES     Cefepime and Toradol [ketorolac tromethamine]  Immunization History   Administered Date(s) Administered    Influenza Virus Vaccine 10/22/2011, 10/23/2012    Influenza, Quadv, 6 mo and older, IM, PF (Flulaval, Fluarix) 12/15/2018    Influenza, Quadv, IM, PF (6 mo and older Fluzone, Flulaval, Fluarix, and 3 yrs and older Afluria) 03/16/2017, 09/29/2017    Tdap (Boostrix, Adacel) 07/19/2016, 08/26/2016, 06/24/2017     PAST MEDICAL HISTORY     Past Medical History:   Diagnosis Date    Acute congestive heart failure ([facility]     BC (acute kidney injury) ([facility] 10/1/2019    Cardiac arrest ([facility] 2/15/2021    Cephalgia 10/9/2019    Chronic kidney disease     Depression     Diabetes mellitus ([address]     Diabetic gastroparesis associated with type 1 diabetes mellitus ([address] 12/17/2018    Diabetic ketoacidosis ([facility] 8/27/2011    Diabetic ketoacidosis with coma associated with type 1 diabetes mellitus ([facility] 6/26/2013    Diabetic polyneuropathy associated with type 1 diabetes mellitus ([facility] 12/27/2020    Drug use complicating pregnancy in third trimester     Endocarditis 10/31/2020    ESRD (end stage renal disease) ([facility] 9/29/2020    Hemod[facility] ([facility]     History of blood transfusion 2019    Hyperlipidemia 10/8/2020    Hyperosmolar hyperglycemic state (HHS) ([facility] 2020    Hypothyroidism 10/8/2020    Iron deficiency anemia 10/1/2019    MDRO (multiple drug resistant organisms) resistance     MRSA (methicillin resistant Staphylococcus aureus)     back wound abcess    Non compliance w medication regimen 3/30/2016    Other disorders of kidney and ureter     Pregnancy 3/30/2016    16 weeks    Previous  delivery affecting pregnancy, antepartum 3/2/2017    Previous stillbirth or demise, antepartum 2016    Seizure ([facility] 2020    Severe pre-eclampsia in third trimester 2016    Shock liver 2/15/2021    Valvular endocarditis 11/10/2020    This Diagnosis was added to the Problem List based on transcribed orders from Dr. Danny Parish       Past Surgical History:   Procedure Laterality Date    BACK SURGERY      abscess     SECTION      x2    CHOLECYSTECTOMY, LAPAROSCOPIC N/A 2019    CHOLECYSTECTOMY LAPAROSCOPIC performed by Pete Dunne MD at [address] [place] [address] Rd N/A 2018    COLONOSCOPY WITH BIOPSY performed by Sheela Newton MD at [address] [place] [address] COLONOSCOPY N/A 2018    COLONOSCOPY WITH BIOPSY performed by Lieutenant Spurling, MD at [address] ECHO COMPL [address]  2012    EF 57%    ECHO COMPL W DOP COLOR FLOW  6/10/2013         EMBOLECTOMY N/A 2020    [facility], [address] [place] [address], YULISSA -- REQS ROOM 3 performed by Amanda Zaragoza MD at [address] Left 2021    LEFT LEG INCISION AND DRAINAGE, DEBRIDEMENT, WOUND VAC APPLICATION performed by Owen Verdin DPM at [address] N/A 2020    LAPAROSCOPIC INSERTION PERITONEAL DIALYSIS CATHETER performed by Terri Delgado MD at [address] [facility] ESOPHAGOGASTRODUODENOSCOPY TRANSORAL DIAGNOSTIC N/A 2018    EGD ESOPHAGOGASTRODUODENOSCOPY performed by Sheela Newton MD at [facility] ENDOSCOPY    TRANSESOPHAGEAL ECHOCARDIOGRAM N/A 10/19/2020    TRANSESOPHAGEAL ECHOCARDIOGRAM WITH BUBBLE STUDY performed by Lashanda Rodriguez MD at [address] TUNNELED VENOUS PORT PLACEMENT  2018    UPPER GASTROINTESTINAL ENDOSCOPY  2018    EGD BIOPSY performed by Sarita Cranker, MD at [address] N/A 10/11/2019    EGD ESOPHAGOGASTRODUODENOSCOPY performed by Lilo Bernstein DO at [address]       Family History   Problem Relation Age of Onset    Asthma Mother     Hypertension Mother     High Blood Pressure Mother     Diabetes Mother     Asthma Brother     High Blood Pressure Father      SOCIAL HISTORY       Social History     Socioeconomic History    Marital status: Single     Spouse name: Not on file    Number of children: 2    Years of education: Not on file    Highest education level: Not on file   Occupational History    Not on file   Tobacco Use    Smoking status: Former Smoker     Packs/day: 0.50     Years: 6.00     Pack years: 3.00     Types: Cigarettes     Quit date: 2021     Years since quittin.2    Smokeless tobacco: Never Used    Tobacco comment: vaper cig   Vaping Use    Vaping Use: Never used   Substance and Sexual Activity    Alcohol use: No    Drug use: No     Comment: documented prior history of opioid abuse    Sexual activity: Yes     Partners: Male   Other Topics Concern    Not on file   Social History Narrative    Not on file     Social Determinants of Health     Financial Resource Strain: High Risk    Difficulty of Paying Living Expenses: Very hard   Food Insecurity: No Food Insecurity    Worried About Running Out of Food in the Last Year: Never true    [place] of Food in the Last Year: Never true   Transportation Needs: No Transportation Needs    Lack of Transportation (Medical): No    Lack of Transportation (Non-Medical):  No   Physical Activity:     Days of Exercise per Week:     Minutes of Exercise per Session:    Stress:     Feeling of Stress :    Social Connections:     Frequency of Communication with Friends and Family:     Frequency of Social Gatherings with Friends and Family:     Attends Rastafari Services:     Active Member of Clubs or Organizations:     Attends Club or Organization Meetings:     Marital Status:    Intimate Partner Violence:     Fear of Current or Ex-Partner:     Emotionally Abused:     Physically Abused:     Sexually Abused:      · [address] [facility]       ED Triage Vitals [05/14/21 1353]   BP Temp Temp Source Pulse Resp SpO2 Height Weight   (!) 69/41 98.6 °F (37 °C) Oral 108 16 100 % 5' 4\" (1.626 m) 146 lb (66.2 kg)     Vitals:    Vitals:    05/15/21 1945 05/16/21 0123 05/16/21 0800 05/16/21 1530   BP: 119/78 129/84 112/78 98/62   Pulse: 99 94 111 100   Resp: 15 13 12 11   Temp: 98.6 °F (37 °C) 98.1 °F (36.7 °C) 98.4 °F (36.9 °C) 98.1 °F (36.7 °C)   TempSrc: Oral Oral Oral Oral   SpO2: 97% 100%  99%   Weight:       Height:         Physical Exam   Constitutional/General: Alert and oriented, NAD THIN   Head: NC/AT  Eyes: PERRL, EOMI  Mouth: Normal mucosa, no thrush   Neck: Supple, full ROM,    Pulmonary: Lungs clear to auscultation bilaterally. Not in respiratory distress  Cardiovascular:  Regular rate and rhythm, no murmurs, gallops, or rubs. Abdomen: Soft, + BS. No distension. Nontender. PD CATH   Extremities: Moves all extremities x 4.    Warm  LEFT LE DRESSING WAS REMOVE NO ERYTHEMA PINK BASE SOME YELLOW SLOUGH STAPLES   Pulses:  Distal pulses  DEC   Skin: Warm and dry without rash  Neurologic:    No focal deficits  Psych: Normal Affect  rij     DIAGNOSTIC RESULTS   RADIOLOGY:   Echo Limited    Result Date: 5/15/2021  Transthoracic Echocardiography Report (TTE)  Demographics   Patient Name   [facility]     Gender            Female                 Alban Gutierrez 19 Record [id number]       Room Number       4823  Number   Account # [id number]      Procedure Date    05/15/2021   Corporate ID                  Ordering                                Physician   Accession      [id number]     Referring  Number                        Physician   Date of Birth  1990     Sonographer       Lelia Luceroludivina JACOBO   Age            31 year(s)     Interpreting      Mir                                 Physician         Physician Cardiology                                                  Elmer Herzog MD                                 Any Other  Procedure Type of Study   TTE procedure:Echo Limited Study. Procedure Date Date: 05/15/2021 Start: 12:06 PM Study Location: Portable Technical Quality: Adequate visualization Indications:LV function. Patient Status: Routine Height: 64 inches Weight: 146 pounds BSA: 1.71 m^2 BMI: 25.06 kg/m^2 Rhythm: Within normal limits HR: 85 bpm BP: 114/74 mmHg  Findings   Left Ventricle  Normal left ventricular chamber size. Normal left ventricular systolic function, LVEF is 60-65%. Right Ventricle  Normal right ventricle size and function. Left Atrium  Left atrium is of normal size. Aortic Valve  . Pericardial Effusion  No evidence of pericardial effusion. Aorta  Normal aortic root size. Conclusions   Summary  Limited Echo for LV function. Normal left ventricular chamber size and systolic function, LVEF is  60-65%. Normal right ventricle size and function. Normal aortic root size. No evidence of pericardial effusion. No intra cardiac mass or thrombus. Compared to prior echo from 2/22/21, no significant changes noted.    Signature   ----------------------------------------------------------------  Electronically signed by Elmer Herzog MD(Interpreting  physician) on 05/15/2021 06:40 PM  ----------------------------------------------------------------  M-Mode/2D Measurements & Calculations   LV Diastolic           LV Systolic Dimension: 2.6 cm  Dimension: 4.1 cm      LV Volume Diastolic: 74.8 ml  LV FS:36.6 %           LV Volume Systolic: 29.3 ml  LV PW Diastolic: 1.1   LV EDV/LV EDV Index: 74.8 ml/44 ml/m^2LV ESV/LV  cm                     ESV Index: 24.2 ml/14ml/ m^2  Septum Diastolic: 0.9  EF Calculated: 67.7 %  cm                     LV Mass Index: 77 l/min*m^2                         LV Length: 7.5 cm  LV Mass: 132.11 g  [email or web address] [id number]    XR TIBIA FIBULA LEFT (2 VIEWS)    Result Date: 5/14/2021  EXAMINATION: XRAY VIEWS OF THE LEFT TIBIA AND FIBULA 5/14/2021 3:55 pm COMPARISON: Left tibia and fibula series from February 22, 2021 HISTORY: ORDERING SYSTEM PROVIDED HISTORY: Superficial infection left tibia with staples in place, evaluate for subcutaneous air or evidence of osteomyelitis TECHNOLOGIST PROVIDED HISTORY: Reason for exam:->Superficial infection left tibia with staples in place, evaluate for subcutaneous air or evidence of osteomyelitis FINDINGS: There are surgical staples along the medial aspect of the mid left shin. Mild soft tissue stranding in this region. There is no evidence of subcutaneous air. No cortical irregularities nor abnormal periosteal elevation along the course of the left tibia or fibula. No evidence of osseous destruction or osseous erosions. Limited evaluation of the left knee and ankle joints are unremarkable. Osseous mineralization is normal.     1.  Surgical staples along the medial left shin. Mild soft tissue stranding in this region. No evidence of subcutaneous air. 2.  No osseous abnormality seen about the left tibia or fibula on this exam. No definite evidence osseous destruction.      XR CHEST PORTABLE    Result Date: 5/14/2021  EXAMINATION: ONE XRAY VIEW OF THE CHEST 5/14/2021 3:55 pm COMPARISON: Chest series from February 24, 2021 HISTORY: ORDERING SYSTEM PROVIDED HISTORY: SOB, eval for PNA vs CHF TECHNOLOGIST PROVIDED HISTORY: Reason for exam:->SOB, eval for PNA vs CHF FINDINGS: Right internal jugular central venous catheter with distal tip projecting near the superior cavoatrial junction. Adequate and symmetric aeration of the lungs. There are no formed consolidations, pleural effusions, or pneumothoraces. Trachea and central mainstem bronchi appear clear. The cardiomediastinal silhouette and pulmonary vascularity appear within normal limits. Osseous and thoracic soft tissue structures demonstrate no acute findings. Cholecystectomy clips in the right upper quadrant. 1.  No evidence of acute cardiopulmonary pathology. 2.  Right internal jugular central venous catheter     LABS  Recent Labs     05/14/21  1426 05/15/21  0706 05/16/21  0540   WBC 6.1 6.4 5.3   HGB 9.1* 10.5* 10.1*   HCT 28.0* 32.5* 32.1*   MCV 91.5 92.1 92.2    331 282     Recent Labs     05/14/21  1426 05/14/21  1426 05/14/21  1746 05/15/21  0706 05/16/21  0540     --  138 140 141   K 1.8*   < > 3.5 2.9* 3.0*   *  --  101 100 99   CO2 20*  --  27 26 26   BUN 25*  --  34* 31* 30*   CREATININE 6.3*  --  9.1* 9.0* 8.7*   GFRAA 9  --  6 6 7   LABGLOM 9  --  6 6 7   GLUCOSE 177*  --  119* 186* 258*   PROT 4.1*  --   --   --   --    LABALBU 1.6*  --   --  2.2* 2.2*   CALCIUM 5.7*  --  9.4 8.9 8.5*   BILITOT <0.2  --   --   --   --    ALKPHOS 115*  --   --   --   --    AST 5  --   --   --   --    ALT 6  --   --   --   --     < > = values in this interval not displayed. No results for input(s): PROCAL in the last 72 hours.   Lab Results   Component Value Date    CRP 12.8 (H) 02/22/2021    CRP 2.5 (H) 10/31/2020    CRP 43.1 (H) 11/01/2019     Lab Results   Component Value Date    SEDRATE 95 (H) 02/22/2021    SEDRATE 35 (H) 10/31/2020    SEDRATE 19 10/13/2020     No results found for: [id number]  Lab Results   Component Value Date    COVID19 Not Detected 05/14/2021    COVID19 Not Detected 11/25/2020     COVID-19/ISAIAH-COV2 LABS  Recent Labs 05/14/21  1426   TROPONINI 0.12*   AST 5   ALT 6     Lab Results   Component Value Date    CHOL 95 01/14/2020    TRIG 82 01/14/2020    HDL 33 01/14/2020    LDLCALC 46 01/14/2020    LABVLDL 16 01/14/2020         Lab Results   Component Value Date    HEPAIGM Non-Reactive 02/13/2020    HEPBIGM Non-Reactive 11/01/2020    HCVABI Non-Reactive 02/13/2020     Hep C Ab Interp   Date Value Ref Range Status   02/13/2020 Non-Reactive NON REACT Final        MICROBIOLOGY:     Cultures :   Lab Results   Component Value Date    BC 24 Hours no growth 05/14/2021    BC 5 Days no growth 02/18/2021    BC 5 Days no growth 02/15/2021    ORG Staphylococcus aureus 05/14/2021    ORG Serratia marcescens 02/18/2021    ORG Nadine albicans 02/18/2021     Lab Results   Component Value Date    BLOODCULT2 24 Hours no growth 05/14/2021    BLOODCULT2 5 Days no growth 02/18/2021    BLOODCULT2 5 Days no growth 02/15/2021    ORG Staphylococcus aureus 05/14/2021    ORG Serratia marcescens 02/18/2021    ORG Nadine albicans 02/18/2021       WOUND/ABSCESS   Date Value Ref Range Status   05/14/2021   Final    Heavy growth  Methicillin resistant Staph aureus isolated. Most Methicillin  resistant Staphylococcus are usually resistant to multiple  antibiotics including other B-Lactams, Aminoglycosides,  Macrolides, Clindamycin and Tetracycline. Contact isolation  is indicated. 02/22/2021 Growth not present  Final   12/11/2014 (A)  Final    Mixed yulisa also isolated, including:  Alpha hemolytic Strep species  Corynebacteria     12/11/2014 Heavy growth  Final       Smear, Respiratory   Date Value Ref Range Status   02/18/2021   Final    Group 6: <25 PMN's/LPF and <25 Epithelial cells/LPF  Rare Polymorphonuclear leukocytes  Rare Epithelial cells  Few Gram positive diplococci  Rare Gram negative rods           Component Value Date/Time    FUNGSM No Fungal elements seen 11/06/2020 1142    LABFUNG No growth after 4 weeks of incubation.  11/06/2020 1142 CULTURE, RESPIRATORY   Date Value Ref Range Status   02/18/2021 Oral Pharyngeal Celine reduced (A)  Final   02/18/2021 Rare growth  Final   02/18/2021 Moderate growth  Final     No results found for: CXCATHTIP  Body Fluid Culture, Sterile   Date Value Ref Range Status   10/20/2020   Final    Neisseria gonorrhoeae not isolated  Growth not present       Culture Surgical   Date Value Ref Range Status   02/23/2021 Growth not present  Final   11/06/2020 Growth not present  Final     Urine Culture, Routine   Date Value Ref Range Status   11/24/2020 Growth not present  Final   10/08/2020 50,000 CFU/ml  Final   08/26/2020 <10,000 CFU/mL  Mixed gram positive organisms    Final     MRSA Culture Only   Date Value Ref Range Status   12/27/2020 Methicillin resistant Staph aureus not isolated  Final   10/13/2020 Methicillin resistant Staph aureus not isolated  Final   10/30/2019 Methicillin resistant Staph aureus not isolated  Final        Patient is a 31 y.o. female who presented with   Chief Complaint   Patient presents with    Altered Mental Status     fatigue, progressive weakness today, EMS called by boyfriend, patient responsive to strong verbal stimulus at present        FINAL IMPRESSION    LEFT LE WOUND WITH POSITIVE MRSA CX  1. Hypokalemia New Problem   2. Hypocalcemia New Problem   3. Hypomagnesemia New Problem   4. ESRD on peritoneal dialysis ([address] Stable   5. Fatigue, unspecified type             vancomycin (VANCOCIN) intermittent dosing (placeholder), RX Placeholder  piperacillin-tazobactam (ZOSYN) 3,375 mg in dextrose 5 % 50 mL IVPB extended infusion (mini-bag), Q12H      wound care podiatry to see  Check cx  vanco level pharmacy dosing \  Bladder scan        Imaging and labs were reviewed per medical records and any ID pertinent labs were addressed with the patient. The patient/FAMILY  was educated about the diagnosis, prognosis, indications, risks and benefits of treatment.       An opportunity to ask

## 2021-05-16 NOTE — PROGRESS NOTES
ccpd initiated per p/p vss. Initial effluent fluid clear.  Pt placed on 2.5% dextrose pd solution per orders

## 2021-05-16 NOTE — PROGRESS NOTES
[address] [place], [place] [address]ist   Progress Note    Admitting Date and Time: 5/14/2021  1:56 PM  Admit Dx: Intractable nausea and vomiting [R11.2]  Hypokalemia [E87.6]    Subjective/interval history:    5/15: Pt seen and examined. RN present at bedside. Today she is awake, alert, oriented x3. Still having severe pain in her left leg. She has been taking oxycodone for this prescribed by her podiatrist, but states that it makes her very drowsy. 5/16: Patient seen and examined. She is very tearful this morning, states that her left leg pain is uncontrolled which is subsequently making her nauseous. She is also having some abdominal cramping. Per RN: Patient's heart rate has been slightly elevated, possibly due to pain. Wound appears more indurated with increased purulent drainage today. Patient feels like she has UTI.     ROS: denies fever, chills, cp, sob, n/v, HA unless stated above.      vancomycin (VANCOCIN) intermittent dosing (placeholder)   Other RX Placeholder    piperacillin-tazobactam  2,250 mg Intravenous Q12H    potassium chloride  20 mEq Oral Daily with breakfast    ARIPiprazole  5 mg Oral Nightly    Calcium Acetate (Phos Binder)  667 mg Oral TID WC    docusate sodium  100 mg Oral BID    hydrALAZINE  10 mg Oral BID    hydrOXYzine  25 mg Oral Daily    insulin glargine  3 Units Subcutaneous BID    insulin lispro  0-3 Units Subcutaneous Nightly    insulin lispro  0-6 Units Subcutaneous TID WC    levothyroxine  75 mcg Oral Daily    metoprolol tartrate  25 mg Oral Daily    mirtazapine  15 mg Oral Nightly    pantoprazole  40 mg Oral QAM AC    rOPINIRole  0.25 mg Oral Nightly    sevelamer  1,600 mg Oral TID WC    sodium chloride flush  5-40 mL Intravenous 2 times per day    heparin (porcine)  5,000 Units Subcutaneous 3 times per day     oxyCODONE, 2.5 mg, Q4H PRN   Or  oxyCODONE, 5 mg, Q4H PRN  fentanNYL, 50 mcg, Q2H PRN  dicyclomine, 10 mg, TID PRN  prochlorperazine, 10 mg, Q6H PRN  loperamide, 2 mg, 4x Daily PRN  albuterol, 2.5 mg, Q6H PRN  glucose, 15 g, PRN  dextrose, 12.5 g, PRN  glucagon (rDNA), 1 mg, PRN  dextrose, 100 mL/hr, PRN  polyethylene glycol, 17 g, Daily PRN  sodium chloride flush, 5-40 mL, PRN  sodium chloride, 25 mL, PRN  promethazine, 12.5 mg, Q6H PRN   Or  ondansetron, 4 mg, Q6H PRN  acetaminophen, 650 mg, Q6H PRN   Or  acetaminophen, 650 mg, Q6H PRN  bisacodyl, 5 mg, Daily PRN  perflutren lipid microspheres, 1.5 mL, ONCE PRN         Objective:    BP 98/62   Pulse 100   Temp 98.1 °F (36.7 °C) (Oral)   Resp 11   Ht 5' 4\" (1.626 m)   Wt 146 lb (66.2 kg)   LMP  (LMP Unknown)   SpO2 99%   BMI 25.06 kg/m²   General Appearance:  Alert and oriented x3. Anxious and tearful today. Skin: warm and dry  Head: normocephalic and atraumatic  Eyes: pupils equal, round, and reactive to light, extraocular eye movements intact, conjunctivae normal  Neck: neck supple and non tender without mass   Pulmonary/Chest: clear to auscultation bilaterally- no wheezes, rales or rhonchi, normal air movement, no respiratory distress  Cardiovascular: normal rate, normal S1 and S2 and no carotid bruits  Abdomen: soft, non-tender, non-distended, normal bowel sounds, no masses or organomegaly  Extremities: Left tibia with ulcerated wound and dry dressing.   Neurologic: no cranial nerve deficit and speech normal      Recent Labs     05/14/21  1746 05/15/21  0706 05/16/21  0540    140 141   K 3.5 2.9* 3.0*    100 99   CO2 27 26 26   BUN 34* 31* 30*   CREATININE 9.1* 9.0* 8.7*   GLUCOSE 119* 186* 258*   CALCIUM 9.4 8.9 8.5*       Recent Labs     05/14/21  1426 05/15/21  0706 05/16/21  0540   ALKPHOS 115*  --   --    PROT 4.1*  --   --    LABALBU 1.6* 2.2* 2.2*   BILITOT <0.2  --   --    AST 5  --   --    ALT 6  --   --        Recent Labs     05/14/21  1426 05/15/21  0706 05/16/21  0540   WBC 6.1 6.4 5.3   RBC 3.06* 3.53 3.48*   HGB 9.1* 10.5* 10.1*   HCT 28.0* 32.5* 32.1*   MCV 91.5 disease  -Nephrology following for peritoneal dialysis management     6. Type 1 diabetes mellitus  -Continue home basal, prandial, and corrective scale insulin regimen     7. Left lower extremity wound  -Concern for previous infection vs pyoderma gangrenosum  -This has been present since February. At that time, she had surgical debridement was on broad-spectrum antibiotic coverage. She is afebrile, no leukocytosis, and wound does not appear to have any significant rounding erythema. However, wound is growing MRSA. Unclear if this is colonization versus true infection. We will give a dose of IV vancomycin and consult infectious disease. Code Status: Full code  DVT prophylaxis: Subcutaneous heparin    NOTE: This report was transcribed using voice recognition software. Every effort was made to ensure accuracy; however, inadvertent computerized transcription errors may be present.      Electronically signed by Bianca Pedroza DO on 5/16/2021 at 6:51 PM

## 2021-05-16 NOTE — PLAN OF CARE
Problem: Falls - Risk of:  Goal: Will remain free from falls  Description: Will remain free from falls  5/16/2021 1519 by Sena Mackey RN  Outcome: Ongoing  5/16/2021 0640 by Dino Harris RN  Outcome: Met This Shift  5/16/2021 0637 by Dino Harris RN  Outcome: Met This Shift  Goal: Absence of physical injury  Description: Absence of physical injury  5/16/2021 1519 by Sena Mackey RN  Outcome: Ongoing  5/16/2021 0640 by Dino Harris RN  Outcome: Met This Shift  5/16/2021 0637 by Dino Harris RN  Outcome: Met This Shift     Problem: Pain:  Description: Pain management should include both nonpharmacologic and pharmacologic interventions.   Goal: Pain level will decrease  Description: Pain level will decrease  5/16/2021 1519 by Sena Mackey RN  Outcome: Ongoing  5/16/2021 0640 by Dino Harris RN  Outcome: Met This Shift  5/16/2021 0637 by Dino Harris RN  Outcome: Met This Shift  Goal: Control of acute pain  Description: Control of acute pain  5/16/2021 1519 by Sena Mackey RN  Outcome: Ongoing  5/16/2021 0640 by Dino Harris RN  Outcome: Met This Shift  5/16/2021 0637 by Dino Harris RN  Outcome: Met This Shift  Goal: Control of chronic pain  Description: Control of chronic pain  5/16/2021 1519 by Sena Mackey RN  Outcome: Ongoing  5/16/2021 0640 by Dino Harris RN  Outcome: Met This Shift  5/16/2021 0637 by Dino Harris RN  Outcome: Met This Shift

## 2021-05-17 LAB
ALBUMIN SERPL-MCNC: 2.5 G/DL (ref 3.5–5.2)
ANION GAP SERPL CALCULATED.3IONS-SCNC: 13 MMOL/L (ref 7–16)
BUN BLDV-MCNC: 28 MG/DL (ref 6–20)
CALCIUM SERPL-MCNC: 8.4 MG/DL (ref 8.6–10.2)
CHLORIDE BLD-SCNC: 97 MMOL/L (ref 98–107)
CO2: 26 MMOL/L (ref 22–29)
CREAT SERPL-MCNC: 8.2 MG/DL (ref 0.5–1)
GFR AFRICAN AMERICAN: 7
GFR NON-AFRICAN AMERICAN: 7 ML/MIN/1.73
GLUCOSE BLD-MCNC: 349 MG/DL (ref 74–99)
HCT VFR BLD CALC: 30.6 % (ref 34–48)
HEMOGLOBIN: 9.9 G/DL (ref 11.5–15.5)
MCH RBC QN AUTO: 29.3 PG (ref 26–35)
MCHC RBC AUTO-ENTMCNC: 32.4 % (ref 32–34.5)
MCV RBC AUTO: 90.5 FL (ref 80–99.9)
METER GLUCOSE: 160 MG/DL (ref 74–99)
METER GLUCOSE: 160 MG/DL (ref 74–99)
METER GLUCOSE: 183 MG/DL (ref 74–99)
METER GLUCOSE: 362 MG/DL (ref 74–99)
METER GLUCOSE: 390 MG/DL (ref 74–99)
PDW BLD-RTO: 13.2 FL (ref 11.5–15)
PHOSPHORUS: 3.3 MG/DL (ref 2.5–4.5)
PLATELET # BLD: 240 E9/L (ref 130–450)
PMV BLD AUTO: 9.9 FL (ref 7–12)
POTASSIUM SERPL-SCNC: 3.3 MMOL/L (ref 3.5–5)
RBC # BLD: 3.38 E12/L (ref 3.5–5.5)
SODIUM BLD-SCNC: 136 MMOL/L (ref 132–146)
VANCOMYCIN RANDOM: 27 MCG/ML (ref 5–40)
WBC # BLD: 6.8 E9/L (ref 4.5–11.5)

## 2021-05-17 PROCEDURE — 2500000003 HC RX 250 WO HCPCS: Performed by: INTERNAL MEDICINE

## 2021-05-17 PROCEDURE — 80202 ASSAY OF VANCOMYCIN: CPT

## 2021-05-17 PROCEDURE — P9047 ALBUMIN (HUMAN), 25%, 50ML: HCPCS | Performed by: INTERNAL MEDICINE

## 2021-05-17 PROCEDURE — 2580000003 HC RX 258: Performed by: INTERNAL MEDICINE

## 2021-05-17 PROCEDURE — 99233 SBSQ HOSP IP/OBS HIGH 50: CPT | Performed by: INTERNAL MEDICINE

## 2021-05-17 PROCEDURE — 36415 COLL VENOUS BLD VENIPUNCTURE: CPT

## 2021-05-17 PROCEDURE — 96366 THER/PROPH/DIAG IV INF ADDON: CPT

## 2021-05-17 PROCEDURE — 2060000000 HC ICU INTERMEDIATE R&B

## 2021-05-17 PROCEDURE — 82962 GLUCOSE BLOOD TEST: CPT

## 2021-05-17 PROCEDURE — 6360000002 HC RX W HCPCS: Performed by: INTERNAL MEDICINE

## 2021-05-17 PROCEDURE — 6370000000 HC RX 637 (ALT 250 FOR IP): Performed by: INTERNAL MEDICINE

## 2021-05-17 PROCEDURE — 90945 DIALYSIS ONE EVALUATION: CPT

## 2021-05-17 PROCEDURE — 96361 HYDRATE IV INFUSION ADD-ON: CPT

## 2021-05-17 PROCEDURE — 96372 THER/PROPH/DIAG INJ SC/IM: CPT

## 2021-05-17 PROCEDURE — 80069 RENAL FUNCTION PANEL: CPT

## 2021-05-17 PROCEDURE — 85027 COMPLETE CBC AUTOMATED: CPT

## 2021-05-17 PROCEDURE — 51798 US URINE CAPACITY MEASURE: CPT

## 2021-05-17 RX ORDER — 0.9 % SODIUM CHLORIDE 0.9 %
500 INTRAVENOUS SOLUTION INTRAVENOUS ONCE
Status: COMPLETED | OUTPATIENT
Start: 2021-05-17 | End: 2021-05-17

## 2021-05-17 RX ORDER — ALBUMIN (HUMAN) 12.5 G/50ML
25 SOLUTION INTRAVENOUS EVERY 8 HOURS
Status: COMPLETED | OUTPATIENT
Start: 2021-05-17 | End: 2021-05-19

## 2021-05-17 RX ORDER — FENTANYL CITRATE 50 UG/ML
25 INJECTION, SOLUTION INTRAMUSCULAR; INTRAVENOUS ONCE
Status: DISCONTINUED | OUTPATIENT
Start: 2021-05-17 | End: 2021-05-17 | Stop reason: ALTCHOICE

## 2021-05-17 RX ADMIN — SODIUM CHLORIDE 500 ML: 9 INJECTION, SOLUTION INTRAVENOUS at 15:00

## 2021-05-17 RX ADMIN — SODIUM CHLORIDE, PRESERVATIVE FREE 10 ML: 5 INJECTION INTRAVENOUS at 23:17

## 2021-05-17 RX ADMIN — PIPERACILLIN AND TAZOBACTAM 3375 MG: 3; .375 INJECTION, POWDER, FOR SOLUTION INTRAVENOUS at 08:48

## 2021-05-17 RX ADMIN — PANTOPRAZOLE SODIUM 40 MG: 40 TABLET, DELAYED RELEASE ORAL at 05:53

## 2021-05-17 RX ADMIN — LOPERAMIDE HYDROCHLORIDE 2 MG: 2 CAPSULE ORAL at 17:30

## 2021-05-17 RX ADMIN — HYDRALAZINE HYDROCHLORIDE 10 MG: 10 TABLET, FILM COATED ORAL at 22:21

## 2021-05-17 RX ADMIN — OXYCODONE 5 MG: 5 TABLET ORAL at 15:26

## 2021-05-17 RX ADMIN — ARIPIPRAZOLE 5 MG: 5 TABLET ORAL at 22:21

## 2021-05-17 RX ADMIN — INSULIN LISPRO 4 UNITS: 100 INJECTION, SOLUTION INTRAVENOUS; SUBCUTANEOUS at 08:48

## 2021-05-17 RX ADMIN — FENTANYL CITRATE 50 MCG: 50 INJECTION INTRAMUSCULAR; INTRAVENOUS at 20:03

## 2021-05-17 RX ADMIN — ROPINIROLE HYDROCHLORIDE 0.25 MG: 0.25 TABLET, FILM COATED ORAL at 22:21

## 2021-05-17 RX ADMIN — OXYCODONE 5 MG: 5 TABLET ORAL at 08:32

## 2021-05-17 RX ADMIN — POTASSIUM CHLORIDE 20 MEQ: 20 TABLET, EXTENDED RELEASE ORAL at 08:46

## 2021-05-17 RX ADMIN — PIPERACILLIN AND TAZOBACTAM 3375 MG: 3; .375 INJECTION, POWDER, FOR SOLUTION INTRAVENOUS at 23:14

## 2021-05-17 RX ADMIN — ALBUMIN (HUMAN) 25 G: 0.25 INJECTION, SOLUTION INTRAVENOUS at 17:30

## 2021-05-17 RX ADMIN — SEVELAMER CARBONATE 1600 MG: 800 TABLET, FILM COATED ORAL at 17:30

## 2021-05-17 RX ADMIN — CALCIUM ACETATE 667 MG: 667 CAPSULE ORAL at 08:47

## 2021-05-17 RX ADMIN — HYDRALAZINE HYDROCHLORIDE 10 MG: 10 TABLET, FILM COATED ORAL at 08:47

## 2021-05-17 RX ADMIN — SODIUM CHLORIDE: 9 INJECTION, SOLUTION INTRAVENOUS at 11:30

## 2021-05-17 RX ADMIN — FENTANYL CITRATE 50 MCG: 50 INJECTION INTRAMUSCULAR; INTRAVENOUS at 13:10

## 2021-05-17 RX ADMIN — INSULIN LISPRO 3 UNITS: 100 INJECTION, SOLUTION INTRAVENOUS; SUBCUTANEOUS at 22:22

## 2021-05-17 RX ADMIN — CALCIUM ACETATE 667 MG: 667 CAPSULE ORAL at 17:30

## 2021-05-17 RX ADMIN — MIRTAZAPINE 15 MG: 15 TABLET, FILM COATED ORAL at 22:22

## 2021-05-17 RX ADMIN — SEVELAMER CARBONATE 1600 MG: 800 TABLET, FILM COATED ORAL at 08:47

## 2021-05-17 RX ADMIN — INSULIN GLARGINE 3 UNITS: 100 INJECTION, SOLUTION SUBCUTANEOUS at 08:48

## 2021-05-17 RX ADMIN — SEVELAMER CARBONATE 1600 MG: 800 TABLET, FILM COATED ORAL at 13:10

## 2021-05-17 RX ADMIN — HEPARIN SODIUM 5000 UNITS: 5000 INJECTION INTRAVENOUS; SUBCUTANEOUS at 22:22

## 2021-05-17 RX ADMIN — CALCIUM ACETATE 667 MG: 667 CAPSULE ORAL at 13:11

## 2021-05-17 RX ADMIN — SODIUM CHLORIDE, PRESERVATIVE FREE 10 ML: 5 INJECTION INTRAVENOUS at 08:47

## 2021-05-17 RX ADMIN — INSULIN LISPRO 1 UNITS: 100 INJECTION, SOLUTION INTRAVENOUS; SUBCUTANEOUS at 13:11

## 2021-05-17 RX ADMIN — FENTANYL CITRATE 50 MCG: 50 INJECTION INTRAMUSCULAR; INTRAVENOUS at 23:17

## 2021-05-17 RX ADMIN — INSULIN GLARGINE 3 UNITS: 100 INJECTION, SOLUTION SUBCUTANEOUS at 22:22

## 2021-05-17 RX ADMIN — LEVOTHYROXINE SODIUM 75 MCG: 75 TABLET ORAL at 05:54

## 2021-05-17 RX ADMIN — HYDROXYZINE HYDROCHLORIDE 25 MG: 10 TABLET ORAL at 08:46

## 2021-05-17 RX ADMIN — ALBUMIN (HUMAN) 25 G: 0.25 INJECTION, SOLUTION INTRAVENOUS at 23:56

## 2021-05-17 RX ADMIN — METOPROLOL TARTRATE 25 MG: 25 TABLET, FILM COATED ORAL at 08:47

## 2021-05-17 RX ADMIN — DOCUSATE SODIUM 100 MG: 100 CAPSULE, LIQUID FILLED ORAL at 08:47

## 2021-05-17 RX ADMIN — HEPARIN SODIUM 5000 UNITS: 5000 INJECTION INTRAVENOUS; SUBCUTANEOUS at 05:54

## 2021-05-17 ASSESSMENT — PAIN DESCRIPTION - DESCRIPTORS
DESCRIPTORS: THROBBING;DISCOMFORT
DESCRIPTORS: ACHING;THROBBING
DESCRIPTORS: ACHING;THROBBING
DESCRIPTORS: DISCOMFORT;THROBBING

## 2021-05-17 ASSESSMENT — PAIN DESCRIPTION - PROGRESSION: CLINICAL_PROGRESSION: NOT CHANGED

## 2021-05-17 ASSESSMENT — PAIN SCALES - GENERAL
PAINLEVEL_OUTOF10: 8
PAINLEVEL_OUTOF10: 8
PAINLEVEL_OUTOF10: 2
PAINLEVEL_OUTOF10: 6
PAINLEVEL_OUTOF10: 8
PAINLEVEL_OUTOF10: 8
PAINLEVEL_OUTOF10: 5

## 2021-05-17 ASSESSMENT — PAIN DESCRIPTION - LOCATION
LOCATION: LEG

## 2021-05-17 ASSESSMENT — PAIN DESCRIPTION - PAIN TYPE
TYPE: CHRONIC PAIN
TYPE: ACUTE PAIN

## 2021-05-17 ASSESSMENT — PAIN DESCRIPTION - ORIENTATION
ORIENTATION: LEFT

## 2021-05-17 ASSESSMENT — PAIN DESCRIPTION - FREQUENCY: FREQUENCY: CONTINUOUS

## 2021-05-17 NOTE — FLOWSHEET NOTE
CCPD started at 19:30; aseptically accessed LLQ PD catheter per [facility] policy; verified programmed orders; aseptically connected cycler to patient's LLQ PD catheter first drain started without complication; clear effluent seen draining; patient stable upon my departure.

## 2021-05-17 NOTE — PROGRESS NOTES
Pharmacy Consultation Note  (Antibiotic Dosing and Monitoring)    Initial consult date: 21  Consulting physician: Dr Sunny Vázquez  Drug(s): Vancomycin IV  Indication: SSTI    Ht Readings from Last 1 Encounters:   21 5' 4\" (1.626 m)     Wt Readings from Last 1 Encounters:   21 141 lb 8 oz (64.2 kg)       Age/  Gender Actual BW IBW DW  Allergy Information   31 y.o.     female 66.2 kg 54.7 kg 59.3 kg  Cefepime and Toradol [ketorolac tromethamine]                 Date  WBC PD Drug/Dose Time   Given Level(s)   (Time) Comments     (#1) 5.3 9 hrs, 4 exachanges Vancomycin 1000 mg IV x 1 1720       (#2) 6.8 9 hrs, 4 exachanges No dose -- 27.0 mcg/mL @ 1340      (#3)           (#4)           (#5)           (#6)           (#7)           Estimated Creatinine Clearance: 9 mL/min (A) (based on SCr of 8.2 mg/dL (HH)). UOP over the past 24 hours:       Intake/Output Summary (Last 24 hours) at 2021 1715  Last data filed at 2021 1000  Gross per 24 hour   Intake 360 ml   Output 640 ml   Net -280 ml       Temp max: Temp (24hrs), Av.2 °F (36.8 °C), Min:97.9 °F (36.6 °C), Max:98.6 °F (37 °C)      Antibiotic Regimen:  Antibiotic Dose Date Initiated            Cultures:  available culture and sensitivity results were reviewed in EPIC  Cultures sent and are pending. Culture Date Result    Blood cx #1  NGTD   Blood cx #2  NGTD   Wound cx; Leg  MRSA   Anaerobic cx; Leg  Not isolated   Covid-19  Not detected     Assessment:  · Consulted by Dr. Sunny Vázquez to dose/monitor vancomycin  · Goal trough level:  15-20 mcg/mL  · Pt is a 30 y/o F with a significant medical history currently on peritoneal dialysis nightly. She presented for this admission with some AMS and electrolyte abnormalities.  During her admission, her pain in her leg has been persistent with some worsening of the wound noted  · Nightly Peritoneal Dialysis  · : Random level today = 27.0 mcg/mL    Plan:  · No dose today  · Random level tomorrow @ 1200  · Dose according to levels and peritoneal dialysis schedule  · Random level tomorrow morning  · Follow peritoneal dialysis schedule      Thank you for the consult,    Aguilar Barros, PharmD, BCPS 5/17/2021 5:17 PM   [phone]

## 2021-05-17 NOTE — FLOWSHEET NOTE
CCPD complete; aseptically disconnected pt from cycler; net  ml's clear yellow effluent with small amount of fibrin noted; dressing changed; PD exit site without complication; patient states she still has some tenderness lower abdomen.; VSS patient in no apparent distress upon my departure; report given to Duncan Redd

## 2021-05-17 NOTE — PROGRESS NOTES
At [address] akash Fu came and got me stating pt was on the toilet and was dizzy stating she felt like she was going to pass out. Checked blood sugar was 160, the checked BP was 74/44 P-120, attempted to get her into a wheelchair and she went unresponsive. Dr Marisol Mai was on the floor came into room pt started waking back up, got her off the toilet and put her in bed and in trendelenburg, BP repeated 82/53, 500 cc bolus hung.  (See flow sheet for repeat vitals)

## 2021-05-17 NOTE — CARE COORDINATION
CM  Note: 5/17/2021 at 3:51pm: COVID NEGATIVE - test done on 5/14. CM talked with the patient for transition of care. States she lives with her mom and 2 children in a one floor apartment. States her mom is watching her children. States if her mom is not there with her, her boyfriend is - so there is someone with her all the time. No home O2. States she went to [place] in February and just returned home at the beginning of May. States does her own peritoneal dialysis at home at night and [facility] supplies [facility]. States she has all her equipment for the dialysis at home. PCP: Dr. Sue Mackey. Pharmacy: [facility] on [address]. Per chart review, patient is going to surgery tomorrow with podiatry to have left leg debrided. ID following. CM / SW to follow.  Akbar Celis RN

## 2021-05-17 NOTE — PROGRESS NOTES
Department of Internal Medicine  Nephrology Attending Progress Note    Events reviewed. Just passed out while sitting on toilet. BP was in the 70s. She is currently in trendelenburg and receiving a normal saline bolus. BP improving. SUBJECTIVE: We are following [address] [place] [address] for ESRD on peritoneal dialysis. She is lethargic but answers questions and follows commands.      PHYSICAL EXAM:      Vitals:    VITALS:  /68   Pulse 98   Temp 98.1 °F (36.7 °C) (Oral)   Resp 16   Ht 5' 4\" (1.626 m)   Wt 141 lb 8 oz (64.2 kg)   LMP  (LMP Unknown)   SpO2 100%   BMI 24.29 kg/m²   24HR INTAKE/OUTPUT:      Intake/Output Summary (Last 24 hours) at 5/17/2021 1421  Last data filed at 5/17/2021 1000  Gross per 24 hour   Intake 560 ml   Output 650 ml   Net -90 ml     PD Catheter Exam:  PD catheter exit site clean    Constitutional: Awake in no acute distress  HEENT:  Normocephalic, PERRL  Respiratory: Decreased breath sounds on the basis  Cardiovascular/Edema:  RRR, S1/S2  Gastrointestinal:  Soft, PD catheter exit site clean   Neurologic:  Nonfocal, AVELAR  Skin:  Warm, dry, no lesions  Other:  Trace edema      DATA:    CBC:   Lab Results   Component Value Date    WBC 6.8 05/17/2021    RBC 3.38 05/17/2021    HGB 9.9 05/17/2021    HCT 30.6 05/17/2021    MCV 90.5 05/17/2021    MCH 29.3 05/17/2021    MCHC 32.4 05/17/2021    RDW 13.2 05/17/2021     05/17/2021    MPV 9.9 05/17/2021     CMP:    Lab Results   Component Value Date     05/17/2021    K 3.3 05/17/2021    K 1.8 05/14/2021    CL 97 05/17/2021    CO2 26 05/17/2021    BUN 28 05/17/2021    CREATININE 8.2 05/17/2021    GFRAA 7 05/17/2021    LABGLOM 7 05/17/2021    GLUCOSE 349 05/17/2021    GLUCOSE 130 05/18/2012    PROT 4.1 05/14/2021    LABALBU 2.5 05/17/2021    LABALBU 4.1 05/18/2012    CALCIUM 8.4 05/17/2021    BILITOT <0.2 05/14/2021    ALKPHOS 115 05/14/2021    AST 5 05/14/2021    ALT 6 05/14/2021     Magnesium:    Lab Results   Component Value Date MG 2.1 05/16/2021     Phosphorus:    Lab Results   Component Value Date    PHOS 3.3 05/17/2021     Radiology Review:      Chest x-ray May 14, 2021   1.  No evidence of acute cardiopulmonary pathology.       2.  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 She reported having nausea but no fever or chills. Problems resolved:    · Altered mental status, resolved    IMPRESSION/RECOMMENDATIONS:      1. ESRD on PD, continue CCPD  2. Hypokalemia, 2/2 to K removal with PD   3. HTN, on hydralazine, metoprolol  4. MBD of CKD, on sevelamer and calcium acetate   5. Anemia of CKD, on   6. Left leg wound with MRSA, on piperacillin-tazobactam and vancomycin, for OR debridement tomorrow  7.  Malnutrition/hypoalbuminemia    Plan:    · Continue CCPD, 2 L exchanges x4/9 hours with last fill of 2 L, 1.5% at night and 2.5% long dwell  · Continue KCl 20 mEq KCl p.o. daily   · Continue to monitor potassium level  · Glucose control  · Monitor BP  · SPA 25 gm iv tid x 8 doses    Electronically signed by HEBER Muhammad CNP on 5/17/2021 at 3:19 PM

## 2021-05-17 NOTE — PROGRESS NOTES
Pt ordered a bladder scan by Dr Sugey Hoffman, bladder scan performed before voiding 205 ml, however pt was connected to peritoneal dialysis at the time. Pt was given some time to provide a urine, pt was unable to void. A post residual was unable to be obtained.   Pt stated she only voids every other day, Dr Raphael Avalos notified of situation and recommended bladder scanned in AM.

## 2021-05-17 NOTE — PROGRESS NOTES
Physician Progress Note      Kenneth Bishop  CSN #:                  [id number]  :                       1990  ADMIT DATE:       2021 1:56 PM  [address] DATE:  RESPONDING  PROVIDER #:        Ganga Mariscal MD          QUERY TEXT:    Dear Attending Provider,    Pt admitted with hypokalemia. Pt noted to have AMS. If possible, please   document in the progress notes and discharge summary if you are evaluating and   / or treating any of the following: The medical record reflects the following:  Risk Factors: ESRD, HTN, DM, CHF  Clinical Indicators: per H&P: \"She is lethargic, does not provide much   history. Laurie Colder [place] BMP showed significant electrolyte derangement with potassium of   1.8, chloride of 113, magnesium 1.2, and calcium of 5.7. She was given a 1 L   bolus normal saline, 2 g magnesium sulfate, and 20 mEq of potassium chloride   IV. Repeat BMP 3 hours after this shows potassium of 3.5, calcium of 9.4.\";   per pn 5/15: \"Today she is awake, alert, oriented x3.\"  Treatment: IMCU monitoring, lab monitoring, IVF's, IV Abx, electrolyte   replacement    Thank You,  Flower Mckeon RN, BSN, CDS  Clinical Documentation Improvement Specialist  [phone]  Options provided:  -- Metabolic encephalopathy  -- Other - I will add my own diagnosis  -- Disagree - Not applicable / Not valid  -- Disagree - Clinically unable to determine / Unknown  -- Refer to Clinical Documentation Reviewer    PROVIDER RESPONSE TEXT:    This patient has metabolic encephalopathy.     Query created by: Maida Gonzalez on 2021 11:41 AM      Electronically signed by:  Ganga Mariscal MD 2021 3:15 PM

## 2021-05-17 NOTE — CONSULTS
Consult dictated  Assessment:  1) Left Leg wound with necrosis  2) Graft necrosis  3) ? Pyoderma gangrenosum  4) IDDM with neuropathy    Plan:  OR debridement tomorrow and biopsy  NPO after midnight.     Thank you for consulting my service    Blair Du, [facility]  Fellowship-Trained Foot and Ankle Surgeon  Diplomate, American Board of Foot and Ankle Surgeons  [phone]

## 2021-05-17 NOTE — PROGRESS NOTES
[facility]  [facility]  Progress Note    NAME: Acosta Carrera  MR:  [id number]  :   1990  DATE OF SERVICE:21    This is a face to face encounter with Wilton Flores 31 y.o. female on 21    CHIEF COMPLAINT     ID following for   Chief Complaint   Patient presents with    Altered Mental Status     fatigue, progressive weakness today, EMS called by boyfriend, patient responsive to strong verbal stimulus at present     HISTORY OF PRESENT ILLNESS   Has pain   Patient is tolerating medications. No reported adverse drug reactions. REVIEW OF SYSTEMS     As stated above in the chief complaint, otherwise negative.   CURRENT MEDICATIONS      vancomycin (VANCOCIN) intermittent dosing (placeholder)   Other RX Placeholder    piperacillin-tazobactam  3,375 mg Intravenous Q12H    potassium chloride  20 mEq Oral Daily with breakfast    ARIPiprazole  5 mg Oral Nightly    Calcium Acetate (Phos Binder)  667 mg Oral TID WC    docusate sodium  100 mg Oral BID    hydrALAZINE  10 mg Oral BID    hydrOXYzine  25 mg Oral Daily    insulin glargine  3 Units Subcutaneous BID    insulin lispro  0-3 Units Subcutaneous Nightly    insulin lispro  0-6 Units Subcutaneous TID WC    levothyroxine  75 mcg Oral Daily    metoprolol tartrate  25 mg Oral Daily    mirtazapine  15 mg Oral Nightly    pantoprazole  40 mg Oral QAM AC    rOPINIRole  0.25 mg Oral Nightly    sevelamer  1,600 mg Oral TID WC    sodium chloride flush  5-40 mL Intravenous 2 times per day    heparin (porcine)  5,000 Units Subcutaneous 3 times per day     Continuous Infusions:   sodium chloride Stopped (21 0219)    dextrose      sodium chloride       PRN Meds:oxyCODONE **OR** oxyCODONE, fentanNYL, dicyclomine, prochlorperazine, loperamide, albuterol, glucose, dextrose, glucagon (rDNA), dextrose, polyethylene glycol, sodium chloride flush, sodium chloride, promethazine **OR** ondansetron, acetaminophen **OR** acetaminophen, bisacodyl, perflutren lipid microspheres    PHYSICAL EXAM     /72   Pulse 93   Temp 98 °F (36.7 °C) (Oral)   Resp 15   Ht 5' 4\" (1.626 m)   Wt 141 lb 8 oz (64.2 kg)   LMP  (LMP Unknown)   SpO2 99%   BMI 24.29 kg/m²   Temp  Av.1 °F (36.7 °C)  Min: 97.9 °F (36.6 °C)  Max: 98.4 °F (36.9 °C)  Constitutional:  The patient is awake, alert, and oriented. Skin:    Warm and dry. No rashes were noted. HEENT:      AT/NC  Neck:    Supple to movements. ij  Chest:   No use of accessory muscles to breathe. Symmetrical expansion. Cardiovascular:  S1 and S2 are rhythmic and regular. No murmurs appreciated. Abdomen:   Positive bowel sounds to auscultation. Benign to palpation. Pd cath  Extremities:   No clubbing, no cyanosis, no edema. left dressed  CNS    AAxO   Lines: rij    DIAGNOSTIC RESULTS   Radiology:    Recent Labs     05/15/21  0706 21  0540 21  0447   WBC 6.4 5.3 6.8   RBC 3.53 3.48* 3.38*   HGB 10.5* 10.1* 9.9*   HCT 32.5* 32.1* 30.6*   MCV 92.1 92.2 90.5   MCH 29.7 29.0 29.3   MCHC 32.3 31.5* 32.4   RDW 13.3 13.3 13.2    282 240   MPV 10.2 10.0 9.9     Recent Labs     21  1426 05/15/21  0706 21  0540 21  0447    140 141 136   K 1.8* 2.9* 3.0* 3.3*   * 100 99 97*   CO2 20* 26 26 26   BUN 25* 31* 30* 28*   CREATININE 6.3* 9.0* 8.7* 8.2*   GLUCOSE 177* 186* 258* 349*   PROT 4.1*  --   --   --    LABALBU 1.6* 2.2* 2.2* 2.5*   CALCIUM 5.7* 8.9 8.5* 8.4*   BILITOT <0.2  --   --   --    ALKPHOS 115*  --   --   --    AST 5  --   --   --    ALT 6  --   --   --      Lab Results   Component Value Date    CRP 12.8 (H) 2021    CRP 2.5 (H) 10/31/2020    CRP 43.1 (H) 2019     Lab Results   Component Value Date    SEDRATE 95 (H) 2021    SEDRATE 35 (H) 10/31/2020    SEDRATE 19 10/13/2020     Recent Labs     21  1426   TROPONINI 0.12*   AST 5   ALT 6     Lab Results   Component Value Date    CHOL 95 2020    TRIG 82 WNDABS Heavy growth  Methicillin resistant Staph aureus isolated. Most Methicillin  resistant Staphylococcus are usually resistant to multiple  antibiotics including other B-Lactams, Aminoglycosides,  Macrolides, Clindamycin and Tetracycline. Contact isolation  is indicated. ORG Staphylococcus aureus*     Recent Labs     05/14/21  1426   ORG Staphylococcus aureus*         FINAL IMPRESSION    Left le surgical wound with MRSA  urinary retention ckd/pd bladder scan 205cc  Check cx    Cont atbx  Wound care     vancomycin (VANCOCIN) intermittent dosing (placeholder), RX Placeholder  piperacillin-tazobactam (ZOSYN) 3,375 mg in dextrose 5 % 50 mL IVPB        · Monitor labs    Imaging and labs were reviewed per medical records. The patient was educated about the diagnosis, prognosis, indications, risks and benefits of treatment. An opportunity to ask questions was given to the patient/FAMILY. Thank you for involving me in the care of [address] [place] [address] I will continue to follow. Please do not hesitate to call for any questions or concerns.     Electronically signed by Srinivasa Frausto MD on 5/17/2021 at 7:40 AM  addmame  Spoke to Dr Waldo Thomas for biopsy   Srinivasa Frausto MD

## 2021-05-17 NOTE — PROGRESS NOTES
Intravenous 2 times per day    heparin (porcine)  5,000 Units Subcutaneous 3 times per day     oxyCODONE, 2.5 mg, Q4H PRN   Or  oxyCODONE, 5 mg, Q4H PRN  fentanNYL, 50 mcg, Q2H PRN  dicyclomine, 10 mg, TID PRN  prochlorperazine, 10 mg, Q6H PRN  loperamide, 2 mg, 4x Daily PRN  albuterol, 2.5 mg, Q6H PRN  glucose, 15 g, PRN  dextrose, 12.5 g, PRN  glucagon (rDNA), 1 mg, PRN  dextrose, 100 mL/hr, PRN  polyethylene glycol, 17 g, Daily PRN  sodium chloride flush, 5-40 mL, PRN  sodium chloride, 25 mL, PRN  promethazine, 12.5 mg, Q6H PRN   Or  ondansetron, 4 mg, Q6H PRN  acetaminophen, 650 mg, Q6H PRN   Or  acetaminophen, 650 mg, Q6H PRN  bisacodyl, 5 mg, Daily PRN  perflutren lipid microspheres, 1.5 mL, ONCE PRN         Objective:    /68   Pulse 98   Temp 98.1 °F (36.7 °C) (Oral)   Resp 16   Ht 5' 4\" (1.626 m)   Wt 141 lb 8 oz (64.2 kg)   LMP  (LMP Unknown)   SpO2 100%   BMI 24.29 kg/m²   General Appearance:  Alert and oriented x3 after fluid bolus and being in Trendelenburg  Skin: warm and dry  Head: normocephalic and atraumatic  Eyes: pupils equal, round, and reactive to light, extraocular eye movements intact, conjunctivae normal  Neck: neck supple and non tender without mass   Pulmonary/Chest: clear to auscultation bilaterally- no wheezes, rales or rhonchi, normal air movement, no respiratory distress  Cardiovascular: normal rate, normal S1 and S2 and no carotid bruits  Abdomen: soft, non-tender, non-distended, normal bowel sounds, no masses or organomegaly  Extremities: Left tibia wound covered with bandage  Neurologic: no cranial nerve deficit and speech normal      Recent Labs     05/15/21  0706 05/16/21  0540 05/17/21  0447    141 136   K 2.9* 3.0* 3.3*    99 97*   CO2 26 26 26   BUN 31* 30* 28*   CREATININE 9.0* 8.7* 8.2*   GLUCOSE 186* 258* 349*   CALCIUM 8.9 8.5* 8.4*       Recent Labs     05/15/21  0706 05/16/21  0540 05/17/21  0447   LABALBU 2.2* 2.2* 2.5*       Recent Labs 05/15/21  0706 05/16/21  0540 05/17/21  0447   WBC 6.4 5.3 6.8   RBC 3.53 3.48* 3.38*   HGB 10.5* 10.1* 9.9*   HCT 32.5* 32.1* 30.6*   MCV 92.1 92.2 90.5   MCH 29.7 29.0 29.3   MCHC 32.3 31.5* 32.4   RDW 13.3 13.3 13.2    282 240   MPV 10.2 10.0 9.9           Radiology:   XR TIBIA FIBULA LEFT (2 VIEWS)   Final Result   1. Surgical staples along the medial left shin. Mild soft tissue stranding   in this region. No evidence of subcutaneous air. 2.  No osseous abnormality seen about the left tibia or fibula on this exam.   No definite evidence osseous destruction. XR CHEST PORTABLE   Final Result   1. No evidence of acute cardiopulmonary pathology. 2.  Right internal jugular central venous catheter             Assessment:  Principal Problem:    Hypokalemia  Active Problems:    Uncontrolled type 1 diabetes mellitus with hyperglycemia, with long-term current use of insulin ([facility])    ESRD on peritoneal dialysis ([facility] [address]    Acute cystitis    Hypertension    Hypomagnesemia    Hypocalcemia    Intractable nausea and vomiting    Wound of left leg, sequela    Syncope  Resolved Problems:    * No resolved hospital problems. *      1. Hypokalemia/hypomagnesemia/hypocalcemia   -Improved from admission with IV and oral replacement  -Monitor electrolytes and replete as needed     2. Intractable nausea/vomiting with abdominal cramping   -Electrolyte derangements have improved. Concern for opioid withdrawal; she was taking Percocet regularly at home and ran out. She also states that her nausea seems to come on when her pain increases. Given she does have a significant left lower extremity wound, will treat with low-dose as needed fentanyl for now.  -Abdominal exam is benign  -As needed Bentyl for abdominal cramping    3. Acute cystitis/UTI   -UA packed with WBCs  -started IV Zosyn  -culture pending     4.     Syncope  -Echocardiogram completed and shows that ejection fraction remains normal, and it was noted that there were no significant changes from 2/22/2021.  -Telemetry reviewed, and there has been no arrhythmias     5. ESRD on peritoneal dialysis  -Nephrology following for peritoneal dialysis management     6. Type I DM  -Continue home basal, prandial, and corrective scale insulin regimen     7. LLE wound  -Concern for recurrent infection vs pyoderma gangrenosum  -This has been present since February. At that time, she had surgical debridement was on broad-spectrum antibiotic coverage. She is afebrile, no leukocytosis, and wound does not appear to have any significant rounding erythema. However, wound is growing MRSA. Unclear if this is colonization versus true infection. She was given of IV vancomycin and ID was consulted. Code Status: Full code  DVT prophylaxis: Subcutaneous heparin    Case discussed with Dr. Javan Currie of nephrology on the floor. NOTE: This report was transcribed using voice recognition software. Every effort was made to ensure accuracy; however, inadvertent computerized transcription errors may be present.      Electronically signed by Bob Hathaway MD on 5/17/2021 at 3:25 PM

## 2021-05-17 NOTE — PROGRESS NOTES
Bladder scan performed, 149 ml present, pt is unable to void at this time to obtain a post residual void.

## 2021-05-18 ENCOUNTER — ANESTHESIA EVENT (OUTPATIENT)
Dept: OPERATING ROOM | Age: 31
DRG: 951 | End: 2021-05-18
Payer: COMMERCIAL

## 2021-05-18 ENCOUNTER — ANESTHESIA (OUTPATIENT)
Dept: OPERATING ROOM | Age: 31
DRG: 951 | End: 2021-05-18
Payer: COMMERCIAL

## 2021-05-18 VITALS
DIASTOLIC BLOOD PRESSURE: 80 MMHG | SYSTOLIC BLOOD PRESSURE: 107 MMHG | OXYGEN SATURATION: 97 % | RESPIRATION RATE: 15 BRPM

## 2021-05-18 LAB
ALBUMIN SERPL-MCNC: 3 G/DL (ref 3.5–5.2)
ANION GAP SERPL CALCULATED.3IONS-SCNC: 12 MMOL/L (ref 7–16)
BUN BLDV-MCNC: 30 MG/DL (ref 6–20)
CALCIUM SERPL-MCNC: 8.6 MG/DL (ref 8.6–10.2)
CHLORIDE BLD-SCNC: 98 MMOL/L (ref 98–107)
CO2: 28 MMOL/L (ref 22–29)
CREAT SERPL-MCNC: 8.4 MG/DL (ref 0.5–1)
GFR AFRICAN AMERICAN: 7
GFR NON-AFRICAN AMERICAN: 7 ML/MIN/1.73
GLUCOSE BLD-MCNC: 206 MG/DL (ref 74–99)
HCT VFR BLD CALC: 25.9 % (ref 34–48)
HEMOGLOBIN: 8.4 G/DL (ref 11.5–15.5)
MCH RBC QN AUTO: 29.5 PG (ref 26–35)
MCHC RBC AUTO-ENTMCNC: 32.4 % (ref 32–34.5)
MCV RBC AUTO: 90.9 FL (ref 80–99.9)
METER GLUCOSE: 185 MG/DL (ref 74–99)
METER GLUCOSE: 201 MG/DL (ref 74–99)
METER GLUCOSE: 229 MG/DL (ref 74–99)
METER GLUCOSE: 235 MG/DL (ref 74–99)
ORGANISM: ABNORMAL
PDW BLD-RTO: 13.1 FL (ref 11.5–15)
PHOSPHORUS: 3.4 MG/DL (ref 2.5–4.5)
PLATELET # BLD: 201 E9/L (ref 130–450)
PMV BLD AUTO: 9.5 FL (ref 7–12)
POTASSIUM SERPL-SCNC: 3 MMOL/L (ref 3.5–5)
RBC # BLD: 2.85 E12/L (ref 3.5–5.5)
SODIUM BLD-SCNC: 138 MMOL/L (ref 132–146)
URINE CULTURE, ROUTINE: NORMAL
VANCOMYCIN RANDOM: 20.5 MCG/ML (ref 5–40)
WBC # BLD: 7.4 E9/L (ref 4.5–11.5)
WOUND/ABSCESS: ABNORMAL

## 2021-05-18 PROCEDURE — P9047 ALBUMIN (HUMAN), 25%, 50ML: HCPCS | Performed by: INTERNAL MEDICINE

## 2021-05-18 PROCEDURE — 2500000003 HC RX 250 WO HCPCS: Performed by: ANESTHESIOLOGY

## 2021-05-18 PROCEDURE — 87106 FUNGI IDENTIFICATION YEAST: CPT

## 2021-05-18 PROCEDURE — 0JBP0ZX EXCISION OF LEFT LOWER LEG SUBCUTANEOUS TISSUE AND FASCIA, OPEN APPROACH, DIAGNOSTIC: ICD-10-PCS | Performed by: PODIATRIST

## 2021-05-18 PROCEDURE — 7100000000 HC PACU RECOVERY - FIRST 15 MIN: Performed by: PODIATRIST

## 2021-05-18 PROCEDURE — 82962 GLUCOSE BLOOD TEST: CPT

## 2021-05-18 PROCEDURE — 0HRLXK3 REPLACEMENT OF LEFT LOWER LEG SKIN WITH NONAUTOLOGOUS TISSUE SUBSTITUTE, FULL THICKNESS, EXTERNAL APPROACH: ICD-10-PCS | Performed by: PODIATRIST

## 2021-05-18 PROCEDURE — 3700000000 HC ANESTHESIA ATTENDED CARE: Performed by: PODIATRIST

## 2021-05-18 PROCEDURE — 6370000000 HC RX 637 (ALT 250 FOR IP): Performed by: INTERNAL MEDICINE

## 2021-05-18 PROCEDURE — 6360000002 HC RX W HCPCS: Performed by: INTERNAL MEDICINE

## 2021-05-18 PROCEDURE — 87205 SMEAR GRAM STAIN: CPT

## 2021-05-18 PROCEDURE — 87186 SC STD MICRODIL/AGAR DIL: CPT

## 2021-05-18 PROCEDURE — 87070 CULTURE OTHR SPECIMN AEROBIC: CPT

## 2021-05-18 PROCEDURE — 2060000000 HC ICU INTERMEDIATE R&B

## 2021-05-18 PROCEDURE — 2580000003 HC RX 258: Performed by: NURSE ANESTHETIST, CERTIFIED REGISTERED

## 2021-05-18 PROCEDURE — 2709999900 HC NON-CHARGEABLE SUPPLY: Performed by: PODIATRIST

## 2021-05-18 PROCEDURE — 85027 COMPLETE CBC AUTOMATED: CPT

## 2021-05-18 PROCEDURE — 87075 CULTR BACTERIA EXCEPT BLOOD: CPT

## 2021-05-18 PROCEDURE — 2580000003 HC RX 258: Performed by: INTERNAL MEDICINE

## 2021-05-18 PROCEDURE — 2500000003 HC RX 250 WO HCPCS: Performed by: NURSE ANESTHETIST, CERTIFIED REGISTERED

## 2021-05-18 PROCEDURE — 3600000012 HC SURGERY LEVEL 2 ADDTL 15MIN: Performed by: PODIATRIST

## 2021-05-18 PROCEDURE — 3700000001 HC ADD 15 MINUTES (ANESTHESIA): Performed by: PODIATRIST

## 2021-05-18 PROCEDURE — 6360000002 HC RX W HCPCS

## 2021-05-18 PROCEDURE — 80202 ASSAY OF VANCOMYCIN: CPT

## 2021-05-18 PROCEDURE — 7100000001 HC PACU RECOVERY - ADDTL 15 MIN: Performed by: PODIATRIST

## 2021-05-18 PROCEDURE — 87206 SMEAR FLUORESCENT/ACID STAI: CPT

## 2021-05-18 PROCEDURE — 99232 SBSQ HOSP IP/OBS MODERATE 35: CPT | Performed by: INTERNAL MEDICINE

## 2021-05-18 PROCEDURE — 6360000002 HC RX W HCPCS: Performed by: ANESTHESIOLOGY

## 2021-05-18 PROCEDURE — 87015 SPECIMEN INFECT AGNT CONCNTJ: CPT

## 2021-05-18 PROCEDURE — 87116 MYCOBACTERIA CULTURE: CPT

## 2021-05-18 PROCEDURE — 6360000002 HC RX W HCPCS: Performed by: NURSE ANESTHETIST, CERTIFIED REGISTERED

## 2021-05-18 PROCEDURE — 2500000003 HC RX 250 WO HCPCS: Performed by: PODIATRIST

## 2021-05-18 PROCEDURE — 80069 RENAL FUNCTION PANEL: CPT

## 2021-05-18 PROCEDURE — 87102 FUNGUS ISOLATION CULTURE: CPT

## 2021-05-18 PROCEDURE — 88304 TISSUE EXAM BY PATHOLOGIST: CPT

## 2021-05-18 PROCEDURE — 96372 THER/PROPH/DIAG INJ SC/IM: CPT

## 2021-05-18 PROCEDURE — 0KBT0ZZ EXCISION OF LEFT LOWER LEG MUSCLE, OPEN APPROACH: ICD-10-PCS | Performed by: PODIATRIST

## 2021-05-18 PROCEDURE — 3600000002 HC SURGERY LEVEL 2 BASE: Performed by: PODIATRIST

## 2021-05-18 PROCEDURE — 2500000003 HC RX 250 WO HCPCS: Performed by: INTERNAL MEDICINE

## 2021-05-18 PROCEDURE — 36415 COLL VENOUS BLD VENIPUNCTURE: CPT

## 2021-05-18 DEVICE — DRESSING WND MICRONIZED PARTIC 500 MG MATRISTEM MICROMATRIX: Type: IMPLANTABLE DEVICE | Site: LEG | Status: FUNCTIONAL

## 2021-05-18 RX ORDER — KETAMINE HYDROCHLORIDE 50 MG/ML
INJECTION, SOLUTION, CONCENTRATE INTRAMUSCULAR; INTRAVENOUS PRN
Status: DISCONTINUED | OUTPATIENT
Start: 2021-05-18 | End: 2021-05-18 | Stop reason: SDUPTHER

## 2021-05-18 RX ORDER — MIDAZOLAM HYDROCHLORIDE 1 MG/ML
INJECTION INTRAMUSCULAR; INTRAVENOUS PRN
Status: DISCONTINUED | OUTPATIENT
Start: 2021-05-18 | End: 2021-05-18 | Stop reason: SDUPTHER

## 2021-05-18 RX ORDER — METOCLOPRAMIDE HYDROCHLORIDE 5 MG/ML
INJECTION INTRAMUSCULAR; INTRAVENOUS
Status: COMPLETED
Start: 2021-05-18 | End: 2021-05-18

## 2021-05-18 RX ORDER — ONDANSETRON 2 MG/ML
INJECTION INTRAMUSCULAR; INTRAVENOUS PRN
Status: DISCONTINUED | OUTPATIENT
Start: 2021-05-18 | End: 2021-05-18 | Stop reason: SDUPTHER

## 2021-05-18 RX ORDER — LIDOCAINE HYDROCHLORIDE 20 MG/ML
INJECTION, SOLUTION INTRAVENOUS PRN
Status: DISCONTINUED | OUTPATIENT
Start: 2021-05-18 | End: 2021-05-18 | Stop reason: SDUPTHER

## 2021-05-18 RX ORDER — HEPARIN SODIUM (PORCINE) LOCK FLUSH IV SOLN 100 UNIT/ML 100 UNIT/ML
100 SOLUTION INTRAVENOUS PRN
Status: DISCONTINUED | OUTPATIENT
Start: 2021-05-18 | End: 2021-05-20 | Stop reason: HOSPADM

## 2021-05-18 RX ORDER — METOCLOPRAMIDE HYDROCHLORIDE 5 MG/ML
10 INJECTION INTRAMUSCULAR; INTRAVENOUS ONCE
Status: DISCONTINUED | OUTPATIENT
Start: 2021-05-18 | End: 2021-05-18 | Stop reason: HOSPADM

## 2021-05-18 RX ORDER — FENTANYL CITRATE 50 UG/ML
INJECTION, SOLUTION INTRAMUSCULAR; INTRAVENOUS PRN
Status: DISCONTINUED | OUTPATIENT
Start: 2021-05-18 | End: 2021-05-18 | Stop reason: SDUPTHER

## 2021-05-18 RX ORDER — POTASSIUM CHLORIDE 20 MEQ/1
20 TABLET, EXTENDED RELEASE ORAL ONCE
Status: COMPLETED | OUTPATIENT
Start: 2021-05-18 | End: 2021-05-18

## 2021-05-18 RX ORDER — METOCLOPRAMIDE HYDROCHLORIDE 5 MG/ML
INJECTION INTRAMUSCULAR; INTRAVENOUS PRN
Status: DISCONTINUED | OUTPATIENT
Start: 2021-05-18 | End: 2021-05-18 | Stop reason: SDUPTHER

## 2021-05-18 RX ORDER — SODIUM CHLORIDE 9 MG/ML
INJECTION, SOLUTION INTRAVENOUS CONTINUOUS PRN
Status: DISCONTINUED | OUTPATIENT
Start: 2021-05-18 | End: 2021-05-18 | Stop reason: SDUPTHER

## 2021-05-18 RX ORDER — PROCHLORPERAZINE EDISYLATE 5 MG/ML
5 INJECTION INTRAMUSCULAR; INTRAVENOUS
Status: DISCONTINUED | OUTPATIENT
Start: 2021-05-18 | End: 2021-05-18 | Stop reason: HOSPADM

## 2021-05-18 RX ORDER — MEPERIDINE HYDROCHLORIDE 25 MG/ML
12.5 INJECTION INTRAMUSCULAR; INTRAVENOUS; SUBCUTANEOUS EVERY 5 MIN PRN
Status: DISCONTINUED | OUTPATIENT
Start: 2021-05-18 | End: 2021-05-18 | Stop reason: HOSPADM

## 2021-05-18 RX ORDER — DIPHENHYDRAMINE HYDROCHLORIDE 50 MG/ML
12.5 INJECTION INTRAMUSCULAR; INTRAVENOUS
Status: DISCONTINUED | OUTPATIENT
Start: 2021-05-18 | End: 2021-05-18 | Stop reason: HOSPADM

## 2021-05-18 RX ORDER — BUPIVACAINE HYDROCHLORIDE 5 MG/ML
INJECTION, SOLUTION EPIDURAL; INTRACAUDAL PRN
Status: DISCONTINUED | OUTPATIENT
Start: 2021-05-18 | End: 2021-05-18 | Stop reason: ALTCHOICE

## 2021-05-18 RX ORDER — HYDROCODONE BITARTRATE AND ACETAMINOPHEN 5; 325 MG/1; MG/1
1 TABLET ORAL
Status: DISCONTINUED | OUTPATIENT
Start: 2021-05-18 | End: 2021-05-18 | Stop reason: HOSPADM

## 2021-05-18 RX ORDER — FENTANYL CITRATE 50 UG/ML
25 INJECTION, SOLUTION INTRAMUSCULAR; INTRAVENOUS EVERY 5 MIN PRN
Status: DISCONTINUED | OUTPATIENT
Start: 2021-05-18 | End: 2021-05-18 | Stop reason: HOSPADM

## 2021-05-18 RX ORDER — PROPOFOL 10 MG/ML
INJECTION, EMULSION INTRAVENOUS CONTINUOUS PRN
Status: DISCONTINUED | OUTPATIENT
Start: 2021-05-18 | End: 2021-05-18 | Stop reason: SDUPTHER

## 2021-05-18 RX ADMIN — POTASSIUM CHLORIDE 20 MEQ: 20 TABLET, EXTENDED RELEASE ORAL at 09:21

## 2021-05-18 RX ADMIN — PIPERACILLIN AND TAZOBACTAM 3375 MG: 3; .375 INJECTION, POWDER, FOR SOLUTION INTRAVENOUS at 10:45

## 2021-05-18 RX ADMIN — HEPARIN 100 UNITS: 100 SYRINGE at 09:47

## 2021-05-18 RX ADMIN — METOCLOPRAMIDE 5 MG: 5 INJECTION, SOLUTION INTRAMUSCULAR; INTRAVENOUS at 07:05

## 2021-05-18 RX ADMIN — LOPERAMIDE HYDROCHLORIDE 2 MG: 2 CAPSULE ORAL at 14:45

## 2021-05-18 RX ADMIN — FENTANYL CITRATE 50 MCG: 50 INJECTION INTRAMUSCULAR; INTRAVENOUS at 18:25

## 2021-05-18 RX ADMIN — FAMOTIDINE 20 MG: 10 INJECTION, SOLUTION INTRAVENOUS at 07:05

## 2021-05-18 RX ADMIN — INSULIN LISPRO 2 UNITS: 100 INJECTION, SOLUTION INTRAVENOUS; SUBCUTANEOUS at 12:03

## 2021-05-18 RX ADMIN — SODIUM CHLORIDE: 9 INJECTION, SOLUTION INTRAVENOUS at 03:32

## 2021-05-18 RX ADMIN — HEPARIN SODIUM 5000 UNITS: 5000 INJECTION INTRAVENOUS; SUBCUTANEOUS at 13:46

## 2021-05-18 RX ADMIN — KETAMINE HYDROCHLORIDE 25 MG: 50 INJECTION INTRAMUSCULAR; INTRAVENOUS at 07:16

## 2021-05-18 RX ADMIN — SEVELAMER CARBONATE 1600 MG: 800 TABLET, FILM COATED ORAL at 11:58

## 2021-05-18 RX ADMIN — ALBUMIN (HUMAN) 25 G: 0.25 INJECTION, SOLUTION INTRAVENOUS at 16:17

## 2021-05-18 RX ADMIN — INSULIN GLARGINE 3 UNITS: 100 INJECTION, SOLUTION SUBCUTANEOUS at 09:49

## 2021-05-18 RX ADMIN — FENTANYL CITRATE 50 MCG: 50 INJECTION INTRAMUSCULAR; INTRAVENOUS at 02:47

## 2021-05-18 RX ADMIN — ONDANSETRON 4 MG: 2 INJECTION INTRAMUSCULAR; INTRAVENOUS at 07:35

## 2021-05-18 RX ADMIN — SEVELAMER CARBONATE 1600 MG: 800 TABLET, FILM COATED ORAL at 16:17

## 2021-05-18 RX ADMIN — INSULIN LISPRO 1 UNITS: 100 INJECTION, SOLUTION INTRAVENOUS; SUBCUTANEOUS at 16:17

## 2021-05-18 RX ADMIN — MIDAZOLAM 2 MG: 1 INJECTION INTRAMUSCULAR; INTRAVENOUS at 07:08

## 2021-05-18 RX ADMIN — HYDROMORPHONE HYDROCHLORIDE 0.5 MG: 1 INJECTION, SOLUTION INTRAMUSCULAR; INTRAVENOUS; SUBCUTANEOUS at 08:22

## 2021-05-18 RX ADMIN — LOPERAMIDE HYDROCHLORIDE 2 MG: 2 CAPSULE ORAL at 03:26

## 2021-05-18 RX ADMIN — PROPOFOL 100 MCG/KG/MIN: 10 INJECTION, EMULSION INTRAVENOUS at 07:16

## 2021-05-18 RX ADMIN — FENTANYL CITRATE 50 MCG: 50 INJECTION INTRAMUSCULAR; INTRAVENOUS at 13:05

## 2021-05-18 RX ADMIN — HYDROMORPHONE HYDROCHLORIDE 0.5 MG: 1 INJECTION, SOLUTION INTRAMUSCULAR; INTRAVENOUS; SUBCUTANEOUS at 08:05

## 2021-05-18 RX ADMIN — SODIUM CHLORIDE, PRESERVATIVE FREE 10 ML: 5 INJECTION INTRAVENOUS at 09:48

## 2021-05-18 RX ADMIN — HYDROMORPHONE HYDROCHLORIDE 0.5 MG: 1 INJECTION, SOLUTION INTRAMUSCULAR; INTRAVENOUS; SUBCUTANEOUS at 07:54

## 2021-05-18 RX ADMIN — FENTANYL CITRATE 50 MCG: 50 INJECTION, SOLUTION INTRAMUSCULAR; INTRAVENOUS at 07:19

## 2021-05-18 RX ADMIN — LIDOCAINE HYDROCHLORIDE 50 MG: 20 INJECTION, SOLUTION INTRAVENOUS at 07:16

## 2021-05-18 RX ADMIN — ALBUMIN (HUMAN) 25 G: 0.25 INJECTION, SOLUTION INTRAVENOUS at 09:21

## 2021-05-18 RX ADMIN — FENTANYL CITRATE 50 MCG: 50 INJECTION INTRAMUSCULAR; INTRAVENOUS at 10:40

## 2021-05-18 RX ADMIN — FENTANYL CITRATE 50 MCG: 50 INJECTION INTRAMUSCULAR; INTRAVENOUS at 15:27

## 2021-05-18 RX ADMIN — FENTANYL CITRATE 50 MCG: 50 INJECTION, SOLUTION INTRAMUSCULAR; INTRAVENOUS at 07:16

## 2021-05-18 RX ADMIN — POTASSIUM CHLORIDE 20 MEQ: 20 TABLET, EXTENDED RELEASE ORAL at 23:58

## 2021-05-18 RX ADMIN — CALCIUM ACETATE 667 MG: 667 CAPSULE ORAL at 09:23

## 2021-05-18 RX ADMIN — SODIUM CHLORIDE: 9 INJECTION, SOLUTION INTRAVENOUS at 07:05

## 2021-05-18 RX ADMIN — DOCUSATE SODIUM 100 MG: 100 CAPSULE, LIQUID FILLED ORAL at 09:22

## 2021-05-18 RX ADMIN — HYDROXYZINE HYDROCHLORIDE 25 MG: 10 TABLET ORAL at 09:21

## 2021-05-18 RX ADMIN — FENTANYL CITRATE 50 MCG: 50 INJECTION INTRAMUSCULAR; INTRAVENOUS at 22:33

## 2021-05-18 RX ADMIN — METOPROLOL TARTRATE 25 MG: 25 TABLET, FILM COATED ORAL at 09:23

## 2021-05-18 RX ADMIN — HYDRALAZINE HYDROCHLORIDE 10 MG: 10 TABLET, FILM COATED ORAL at 09:22

## 2021-05-18 RX ADMIN — SEVELAMER CARBONATE 1600 MG: 800 TABLET, FILM COATED ORAL at 09:22

## 2021-05-18 ASSESSMENT — PAIN DESCRIPTION - DESCRIPTORS
DESCRIPTORS: ACHING;THROBBING
DESCRIPTORS: ACHING
DESCRIPTORS: ACHING;DISCOMFORT
DESCRIPTORS: ACHING;DISCOMFORT
DESCRIPTORS: ACHING

## 2021-05-18 ASSESSMENT — PAIN DESCRIPTION - ONSET: ONSET: ON-GOING

## 2021-05-18 ASSESSMENT — PAIN DESCRIPTION - LOCATION
LOCATION: FOOT
LOCATION: LEG
LOCATION: FOOT

## 2021-05-18 ASSESSMENT — PULMONARY FUNCTION TESTS
PIF_VALUE: 0
PIF_VALUE: 1
PIF_VALUE: 0
PIF_VALUE: 1
PIF_VALUE: 0

## 2021-05-18 ASSESSMENT — PAIN DESCRIPTION - PROGRESSION
CLINICAL_PROGRESSION: GRADUALLY IMPROVING
CLINICAL_PROGRESSION: GRADUALLY IMPROVING
CLINICAL_PROGRESSION: RAPIDLY WORSENING
CLINICAL_PROGRESSION: GRADUALLY IMPROVING

## 2021-05-18 ASSESSMENT — PAIN SCALES - GENERAL
PAINLEVEL_OUTOF10: 7
PAINLEVEL_OUTOF10: 8
PAINLEVEL_OUTOF10: 7
PAINLEVEL_OUTOF10: 8
PAINLEVEL_OUTOF10: 7
PAINLEVEL_OUTOF10: 8
PAINLEVEL_OUTOF10: 8
PAINLEVEL_OUTOF10: 5
PAINLEVEL_OUTOF10: 6
PAINLEVEL_OUTOF10: 6
PAINLEVEL_OUTOF10: 8
PAINLEVEL_OUTOF10: 9
PAINLEVEL_OUTOF10: 8
PAINLEVEL_OUTOF10: 7

## 2021-05-18 ASSESSMENT — LIFESTYLE VARIABLES: SMOKING_STATUS: 0

## 2021-05-18 ASSESSMENT — PAIN DESCRIPTION - FREQUENCY
FREQUENCY: CONTINUOUS

## 2021-05-18 ASSESSMENT — PAIN DESCRIPTION - ORIENTATION
ORIENTATION: LEFT
ORIENTATION: LEFT
ORIENTATION: LOWER;LEFT
ORIENTATION: LEFT
ORIENTATION: LEFT

## 2021-05-18 ASSESSMENT — PAIN DESCRIPTION - PAIN TYPE
TYPE: CHRONIC PAIN
TYPE: ACUTE PAIN;SURGICAL PAIN

## 2021-05-18 NOTE — OP NOTE
[address]                                OPERATIVE REPORT    PATIENT NAME: Leah Lee                 :        1990  MED REC NO:   [id number]                            ROOM:       2842  ACCOUNT NO:   [de-identified]                           ADMIT DATE: 2021  PROVIDER:     Matthew Nolasco DPM    DATE OF PROCEDURE:  2021    SURGEON:  Matthew Nolasco DPM    ASSISTANT:  Dionicio Kang, PGY-2    PREOPERATIVE DIAGNOSES:  1. Left leg ulceration with necrosis of muscle. 2.  Skin graft necrosis/failure. 3.  Gangrene, left leg. 4.  Questionable pyoderma gangrenosum. POSTOPERATIVE DIAGNOSES:  1. Left leg ulceration with necrosis of muscle. 2.  Skin graft necrosis/failure. 3.  Gangrene, left leg. 4.  Questionable pyoderma gangrenosum. PROCEDURES:  1. Excisional left leg wound debridement to and through level of deep  fascia and muscle. Total measurement of debridement was 8 cm x 3 cm x  0.5 cm in dimension. 2.  Biopsy of left leg wound. 3.  Application of skin substitute collagen graft. 4.  Application of wound VAC negative pressure therapy. ANESTHESIA:  Monitored anesthesia care. ESTIMATED BLOOD LOSS:  Minimal.    COMPLICATIONS:  None. MATERIALS USED:  3-0 nylon suture for retention, ACell MicroMatrix 500  mL. SPECIMENS OBTAINED:  Left leg wound biopsy as well as left leg wound  culture; aerobic, anaerobic, and fungal.    INTRAOPERATIVE FINDINGS:  Necrosis of graft and muscle. Adequate  bleeding observed. INDICATIONS:  A pleasant 74-year-old female with type 1 diabetes  mellitus, presented today for a left leg wound debridement after a  failed graft that was done as an outpatient at [facility].  The patient was found to have positive MRSA culture and  necrosis of the graft. I was consulted for aforementioned.   It was also  suspicion that she may have also pyoderma gangrenosum as well _____. Thus counseled her on the nature of the problem, proposed course of the  procedure, potential benefits, risks, complications, and convalescence  in detail. All questions were answered to her apparent satisfaction. No guarantees have been given as to the outcome of the procedure. OPERATIVE PROCEDURE:  Under mild sedation, the patient was brought to  the operating room and placed on the operating table in supine position. The left lower extremity was scrubbed, prepped, and draped in usual  sterile fashion. At this time, using a #10 blade and curettage, I  performed sharp excisional debridement of the portion of the ulceration  in its to and through level of the deep fascia and muscle.  _____  periphery of the wound was left alone while it was biopsied and the  periphery to confirm for pyoderma gangrenosum. I irrigated the areas  using copious amount of normal saline. Next, using 3-0 nylon, we  applied retention suture to keep the area closed. Then, 500 mL of  MicroMatrix over the area, nonadherent dressing as well as wound VAC  negative pressure therapy set at 125 mmHg continuous. Excellent seal  was maintained. Postoperative bandage was then applied. The patient tolerated the procedure and anesthesia well in apparent  satisfactory condition with vital signs stable and vascular status  intact to the left lower extremity. The patient was then transferred to  PACU for further monitoring prior to readmission to the nursing floor.         Isabel Carrera DPM    D: 05/18/2021 7:44:45       T: 05/18/2021 11:22:55     [email or web address]  Job#: [id number]     Doc#: [id number]    CC:

## 2021-05-18 NOTE — PROGRESS NOTES
Department of Internal Medicine  Nephrology Attending Consult Note    Events reviewed. SUBJECTIVE: We are following [address] [place] [address] for ESRD on peritoneal dialysis. Reports no complaints.     PHYSICAL EXAM:      Vitals:    VITALS:  /65   Pulse 97   Temp 98 °F (36.7 °C) (Oral)   Resp 16   Ht 5' 4\" (1.626 m)   Wt 141 lb 8.6 oz (64.2 kg)   LMP  (LMP Unknown)   SpO2 100%   BMI 24.29 kg/m²   24HR INTAKE/OUTPUT:      Intake/Output Summary (Last 24 hours) at 5/18/2021 1925  Last data filed at 5/18/2021 1412  Gross per 24 hour   Intake 915 ml   Output 646 ml   Net 269 ml     PD Catheter Exam:  PD catheter exit site clean    Constitutional: Awake in no acute distress  HEENT:  Normocephalic, PERRL  Respiratory: Decreased breath sounds on the basis  Cardiovascular/Edema:  RRR, S1/S2  Gastrointestinal:  Soft, PD catheter exit site clean   Neurologic:  Nonfocal, AVELAR  Skin:  Warm, dry, no lesions  Other:  +edema      DATA:    CBC:   Lab Results   Component Value Date    WBC 7.4 05/18/2021    RBC 2.85 05/18/2021    HGB 8.4 05/18/2021    HCT 25.9 05/18/2021    MCV 90.9 05/18/2021    MCH 29.5 05/18/2021    MCHC 32.4 05/18/2021    RDW 13.1 05/18/2021     05/18/2021    MPV 9.5 05/18/2021     CMP:    Lab Results   Component Value Date     05/18/2021    K 3.0 05/18/2021    K 1.8 05/14/2021    CL 98 05/18/2021    CO2 28 05/18/2021    BUN 30 05/18/2021    CREATININE 8.4 05/18/2021    GFRAA 7 05/18/2021    LABGLOM 7 05/18/2021    GLUCOSE 206 05/18/2021    GLUCOSE 130 05/18/2012    PROT 4.1 05/14/2021    LABALBU 3.0 05/18/2021    LABALBU 4.1 05/18/2012    CALCIUM 8.6 05/18/2021    BILITOT <0.2 05/14/2021    ALKPHOS 115 05/14/2021    AST 5 05/14/2021    ALT 6 05/14/2021     Magnesium:    Lab Results   Component Value Date    MG 2.1 05/16/2021     Phosphorus:    Lab Results   Component Value Date    PHOS 3.4 05/18/2021     Radiology Review:      Chest x-ray May 14, 2021   1.  No evidence of acute cardiopulmonary pathology.       2.  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 She reported having nausea but no fever or chills. Problems resolved:    · Altered mental status, resolved    IMPRESSION/RECOMMENDATIONS:      1. ESRD on PD, continue CCPD  2. Hypokalemia, 2/2 to K removal with PD   3. HTN, on hydralazine, metoprolol  4. MBD of CKD, on sevelamer  5. Left leg wound + MRSA, on vancomycin  6. Gastroparesis  7.  Anemia of CKD, on calcium acetate 667 mg 3 times daily with meals    Plan:    · Continue CCPD, 2 L exchanges x4/9 hours with last fill of 2 L, 1.5% at night and 2.5% long dwell  · Replace potassium, 20 mEq KCl p.o. daily, additional 20 mEq today   · Continue to monitor potassium level

## 2021-05-18 NOTE — PROGRESS NOTES
[address] [place], [place] [address]   Progress Note    Admitting Date and Time: 5/14/2021  1:56 PM  Admit Dx: Intractable nausea and vomiting [R11.2]  Hypokalemia [E87.6]    Subjective/interval history:    5/15: Pt seen and examined. RN present at bedside. Today she is awake, alert, oriented x3. Still having severe pain in her left leg. She has been taking oxycodone for this prescribed by her podiatrist, but states that it makes her very drowsy. 5/16: Patient seen and examined. She is very tearful this morning, states that her left leg pain is uncontrolled which is subsequently making her nauseous. She is also having some abdominal cramping.    5/17: Gotten called in the patient's room where she got lightheaded while on the commode. .  With the help of nursing got her to the wheelchair and then back into bed. We put her bed in Trendelenburg and gave her 500 cc fluid bolus. She got back to her baseline mentation and blood pressure improved from 74/44 to 113/58    5/18: Patient had surgery this morning on left lower leg. Wound VAC was placed. She is now back in the room. Patient states she wants to be home for her birthday on Thursday. Per RN: Patient is ordered both Renvela and PhosLo here.  Should she be on both?       HYDROmorphone        albumin human  25 g Intravenous Q8H    vancomycin (VANCOCIN) intermittent dosing (placeholder)   Other RX Placeholder    piperacillin-tazobactam  3,375 mg Intravenous Q12H    potassium chloride  20 mEq Oral Daily with breakfast    ARIPiprazole  5 mg Oral Nightly    Calcium Acetate (Phos Binder)  667 mg Oral TID WC    docusate sodium  100 mg Oral BID    hydrALAZINE  10 mg Oral BID    hydrOXYzine  25 mg Oral Daily    insulin glargine  3 Units Subcutaneous BID    insulin lispro  0-3 Units Subcutaneous Nightly    insulin lispro  0-6 Units Subcutaneous TID WC    levothyroxine  75 mcg Oral Daily    metoprolol tartrate  25 mg Oral Daily    mirtazapine cramping    3. Acute cystitis/UTI   -UA packed with WBCs  -started IV Zosyn  -culture pending     4. Syncope  -Echocardiogram completed and shows that ejection fraction remains normal, and it was noted that there were no significant changes from 2/22/2021.  -Telemetry reviewed, and there has been no arrhythmias     5. ESRD on peritoneal dialysis  -Nephrology following for peritoneal dialysis management     6. Type I DM  -Continue home basal, prandial, and corrective scale insulin regimen     7. LLE wound  -Concern for recurrent infection vs pyoderma gangrenosum  -This has been present since February. At that time, she had surgical debridement was on broad-spectrum antibiotic coverage. She is afebrile, no leukocytosis, and wound does not appear to have any significant rounding erythema. However, wound is growing MRSA. Unclear if this is colonization versus true infection. She was given of IV vancomycin and ID was consulted. Patient remains on vancomycin and Zosyn  -Patient underwent debridement in the OR this morning. Wound VAC was placed. .    Code Status: Full code  DVT prophylaxis: Subcutaneous heparin    NOTE: This report was transcribed using voice recognition software. Every effort was made to ensure accuracy; however, inadvertent computerized transcription errors may be present.      Electronically signed by Jennifer Monroe MD on 5/18/2021 at 12:17 PM

## 2021-05-18 NOTE — FLOWSHEET NOTE
CCPD completed w/o complications. Drained 646 ml clear, pale yellow effluent. No fibrin detected in effluent. Aseptically, clamped and capped PD catheter. Pt denies c/o at this time. Transporter here to take to OR for debridement.

## 2021-05-18 NOTE — PROGRESS NOTES
Spoke to Dr. Nan Haynes, notified of the patient's vital signs, no new orders, okay to be discharged to the 6th floor.

## 2021-05-18 NOTE — FLOWSHEET NOTE
CCPD tx initiated using strict aseptic technique , 4x 2000cc 1.5% over 9 hrs with 2000cc ff of 2.5% .  Denies distres

## 2021-05-18 NOTE — CONSULTS
[address]                                  CONSULTATION    PATIENT NAME: Kimberly Candelaria                 :        1990  MED REC NO:   [id number]                            ROOM:       [id number]  ACCOUNT NO:   [de-identified]                           ADMIT DATE: 2021  PROVIDER:     Alicia Conte DPM    CONSULT DATE:  2021    CONSULTANT:  Alicia Conte DPM    CHIEF COMPLAINT:  Left leg wounds. HISTORY OF PRESENT ILLNESS:  This is a pleasant 79-year-old female with  type 1 diabetes mellitus and neuropathy, well known to my service,  underwent previous debridement three months ago. There was problem  reported at debridement. Apparently, she had been discharged to [facility] where she received further care and had graft harvest over the  left leg. Apparently, the graft appeared to be necrosed. The patient  admitted with hypokalemia, but was found to have further regression of  the wound. I was consulted for aforementioned. PAST MEDICAL HISTORY:  Acute congestive heart failure, chronic kidney  disease, depression, type 1 diabetes mellitus with neuropathy, end-stage  renal disease, endocarditis, hypothyroidism, previous valvular  endocarditis. PAST SURGICAL HISTORY:  Back surgery, , colonoscopy, previous  foot debridement. MEDICATIONS:  Per med rec. ALLERGIES:  CEFEPIME, TORADOL. SOCIAL HISTORY:  The patient reports that she quit smoking approximately  three months ago where she smoked cigarettes, three pack-year smoking  tobacco history. Denies any alcohol or illicit drug use. FAMILY HISTORY:  Asthma in her brother and mother. Diabetes in her  mother. High blood pressure in her father and mother.  _____ in her  mother. REVIEW OF SYSTEMS:  Unremarkable aside of chief complaint.     PHYSICAL EXAMINATION:  VITAL SIGNS:  Blood pressure 141/96, pulse of 86, temperature 98.6,  respirations 18. GENERAL:  The patient is awake, alert, and oriented x3, but was appeared  to be anxious. VASCULAR:  DP and PT pulses palpable. Capillary refill is brisk at the  toes. Skin temperature is warm, proximal to distal.  NEUROLOGIC:  Protective sensation absent to lower extremities. Epicritic sensation is otherwise intact. DERMATOLOGIC:  Necrosis of the anterior leg wound of left leg with  liquefactive necrosis noted. There are also skin staples, appeared to  be partly intact. There is also failure of the graft noted at this time  consistent with the patient's positive cultures for Staph aureus,  specifically MRSA. MUSCULOSKELETAL:  Pain on palpation to the periwound area. No pain on  compression of posterior calf or anterior thigh. LABORATORY DATA:  Sodium 136, potassium 3.3, chloride is 97, bicarb is  26, glucose 349, BUN is _____, creatinine is 8.2. WBC is 6.8,  hemoglobin is 9.9, hematocrit 30.6, platelet count is 048. ASSESSMENT:  1. Left leg wound with necrosis, deep fascial muscle. 2.  Graft necrosis, anterior. 3.  Gangrene. 4.  Questionable pyoderma gangrenosum. 5.  Insulin-dependent diabetes mellitus type 1 with neuropathy. TREATMENT PLAN AND RECOMMENDATION:  I had a lengthy discussion with the  patient in detail as well as I discussed the case with her primary team  as well as infectious disease. I recommend biopsy at this time. I also  recommend debridement of the periphery of the wound secondary to the  failed graft. All questions were answered to her apparent satisfaction. The patient is anxious to go forward with the proposed surgical plan. Thank you for consulting my service.         Jory Carolina DPM    D: 05/17/2021 19:18:19       T: 05/17/2021 22:51:40     [email or web address]  Job#: [id number]     Doc#: [id number]    CC:

## 2021-05-18 NOTE — PROGRESS NOTES
Pharmacy Consultation Note  (Antibiotic Dosing and Monitoring)    Initial consult date: 21  Consulting physician: Dr Arielle Stout  Drug(s): Vancomycin IV  Indication: SSTI    Ht Readings from Last 1 Encounters:   21 5' 4\" (1.626 m)     Wt Readings from Last 1 Encounters:   21 141 lb 8.6 oz (64.2 kg)       Age/  Gender Actual BW IBW DW  Allergy Information   31 y.o.     female 66.2 kg 54.7 kg 59.3 kg  Cefepime and Toradol [ketorolac tromethamine]                 Date  WBC PD Drug/Dose Time   Given Level(s)   (Time) Comments     (#1) 5.3 9 hrs, 4 exachanges Vancomycin 1000 mg IV x 1 1720       (#2) 6.8 9 hrs, 4 exachanges No dose -- 27.0 mcg/mL @ 1340      (#3) 7.4 9 hrs, 4 exachanges No dose -- 20.5 mcg/mL @ 1203      (#4)           (#5)           (#6)           (#7)           Estimated Creatinine Clearance: 9 mL/min (A) (based on SCr of 8.4 mg/dL (HH)). UOP over the past 24 hours:       Intake/Output Summary (Last 24 hours) at 2021 1307  Last data filed at 2021 0844  Gross per 24 hour   Intake 650 ml   Output 646 ml   Net 4 ml       Temp max: Temp (24hrs), Av.8 °F (36.6 °C), Min:97 °F (36.1 °C), Max:98.4 °F (36.9 °C)      Antibiotic Regimen:  Antibiotic Dose Date Initiated            Cultures:  available culture and sensitivity results were reviewed in EPIC  Cultures sent and are pending. Culture Date Result    Blood cx #1  NGTD   Blood cx #2  NGTD   Wound cx; Leg  MRSA   Anaerobic cx; Leg  Not isolated   Covid-19  Not detected     Assessment:  · Consulted by Dr. Arielle Stout to dose/monitor vancomycin  · Goal trough level:  15-20 mcg/mL  · Pt is a 32 y/o F with a significant medical history currently on peritoneal dialysis nightly. She presented for this admission with some AMS and electrolyte abnormalities.  During her admission, her pain in her leg has been persistent with some worsening of the wound noted  · Nightly Peritoneal Dialysis  · : Random level today = 27.0 mcg/mL  · 5/18: Random level today = 20.5 mcg/mL, OR for debridement today    Plan:  · No dose today  · Random level tomorrow with AM labs  · Dose according to levels and peritoneal dialysis schedule  · Random level tomorrow morning  · Follow peritoneal dialysis schedule      Thank you for the consult,    Jesus Simms, PharmD, BCPS 5/18/2021 1:07 PM   [phone]

## 2021-05-18 NOTE — PROGRESS NOTES
dextrose, polyethylene glycol, sodium chloride flush, sodium chloride, promethazine **OR** ondansetron, acetaminophen **OR** acetaminophen, bisacodyl, perflutren lipid microspheres    PHYSICAL EXAM     BP (!) 147/91   Pulse 104   Temp 97.9 °F (36.6 °C) (Oral)   Resp 16   Ht 5' 4\" (1.626 m)   Wt 141 lb 8.6 oz (64.2 kg)   LMP  (LMP Unknown)   SpO2 100%   BMI 24.29 kg/m²   Temp  Av.8 °F (36.6 °C)  Min: 97 °F (36.1 °C)  Max: 98.4 °F (36.9 °C)  Constitutional:  The patient is awake, alert, and oriented. thon   Skin:    Warm and dry. No rashes were noted. HEENT:      AT/NC  Neck:    Supple to movements. ij  Chest:   No use of accessory muscles to breathe. Symmetrical expansion. Cardiovascular:  S1 and S2 are rhythmic and regular. N   Abdomen:   Positive bowel sounds to auscultation. Benign to palpation. Pd cath  Extremities:   No clubbing, no cyanosis, no edema. left dressed aced  CNS    AAxO   Lines: rij    DIAGNOSTIC RESULTS   Radiology:    Recent Labs     21  0540 21  0447 21  0450   WBC 5.3 6.8 7.4   RBC 3.48* 3.38* 2.85*   HGB 10.1* 9.9* 8.4*   HCT 32.1* 30.6* 25.9*   MCV 92.2 90.5 90.9   MCH 29.0 29.3 29.5   MCHC 31.5* 32.4 32.4   RDW 13.3 13.2 13.1    240 201   MPV 10.0 9.9 9.5     Recent Labs     21  0540 21  0447 21  0450    136 138   K 3.0* 3.3* 3.0*   CL 99 97* 98   CO2 26 26 28   BUN 30* 28* 30*   CREATININE 8.7* 8.2* 8.4*   GLUCOSE 258* 349* 206*   LABALBU 2.2* 2.5* 3.0*   CALCIUM 8.5* 8.4* 8.6     Lab Results   Component Value Date    CRP 12.8 (H) 2021    CRP 2.5 (H) 10/31/2020    CRP 43.1 (H) 2019     Lab Results   Component Value Date    SEDRATE 95 (H) 2021    SEDRATE 35 (H) 10/31/2020    SEDRATE 19 10/13/2020     No results for input(s): CRP, PROCAL, FERRITIN, LDH, TROPONINI, DDIMER, FIBRINOGEN, INR, PROTIME, AST, ALT, TRIG in the last 72 hours.   Lab Results   Component Value Date    CHOL 95 2020    TRIG 82 2020 HDL 33 01/14/2020    LDLCALC 46 01/14/2020    LABVLDL 16 01/14/2020     Lab Results   Component Value Date/Time    VITD25 7 (L) 02/21/2021 05:27 AM       Microbiology:   No results for input(s): COVID19 in the last 72 hours. Lab Results   Component Value Date    BC 24 Hours no growth 05/14/2021    BC 5 Days no growth 02/18/2021    BC 5 Days no growth 02/15/2021    BLOODCULT2 24 Hours no growth 05/14/2021    BLOODCULT2 5 Days no growth 02/18/2021    BLOODCULT2 5 Days no growth 02/15/2021    ORG Staphylococcus aureus 05/16/2021    ORG Staphylococcus aureus 05/14/2021    ORG Serratia marcescens 02/18/2021    ORG Nadine albicans 02/18/2021     WOUND/ABSCESS   Date Value Ref Range Status   05/16/2021   Final    Heavy growth  Refer to previous culture (CXWND: Leg-Left collected on 5-14-21 at 1426)  for susceptibility results     05/14/2021   Final    Heavy growth  Methicillin resistant Staph aureus isolated. Most Methicillin  resistant Staphylococcus are usually resistant to multiple  antibiotics including other B-Lactams, Aminoglycosides,  Macrolides, Clindamycin and Tetracycline. Contact isolation  is indicated. 02/22/2021 Growth not present  Final     Smear, Respiratory   Date Value Ref Range Status   02/18/2021   Final    Group 6: <25 PMN's/LPF and <25 Epithelial cells/LPF  Rare Polymorphonuclear leukocytes  Rare Epithelial cells  Few Gram positive diplococci  Rare Gram negative rods           Component Value Date/Time    FUNGSM No Fungal elements seen 11/06/2020 1142    LABFUNG No growth after 4 weeks of incubation.  11/06/2020 1142       MRSA Culture Only   Date Value Ref Range Status   12/27/2020 Methicillin resistant Staph aureus not isolated  Final     CULTURE, RESPIRATORY   Date Value Ref Range Status   02/18/2021 Oral Pharyngeal Celine reduced (A)  Final   02/18/2021 Rare growth  Final   02/18/2021 Moderate growth  Final     Recent Labs     05/16/21  1530   ORG Staphylococcus aureus*     Recent Labs 05/16/21  1530   WNDABS Heavy growth  Refer to previous culture (CXWND: Leg-Left collected on 5-14-21 at 1426)  for susceptibility results     ORG Staphylococcus aureus*           FINAL IMPRESSION    Left le surgical wound with MRSA  -LEFT LEG DEBRIDEMENT BIOPSY POSS APPLICATION WOUND VAC  urinary retention ckd/pd bladder scan 205cc  Check cx    Cont atbx  Wound care     vancomycin (VANCOCIN) intermittent dosing (placeholder), RX Placeholder  piperacillin-tazobactam (ZOSYN) 3,375 mg in dextrose 5 % 50 mL IVPB   Will stop     Check cx and path   Cont atbx  · Monitor labs    Imaging and labs were reviewed per medical records. An opportunity to ask questions was given to the patient/FAMILY. Thank you for involving me in the care of [address] [place] [address] I will continue to follow. Please do not hesitate to call for any questions or concerns.     Electronically signed by Arianne Camp MD on 5/18/2021 at 12:10 PM

## 2021-05-18 NOTE — ANESTHESIA PRE PROCEDURE
Evin Lopez, DO   25 mg at 05/17/21 0846    insulin glargine (LANTUS) injection vial 3 Units  3 Units Subcutaneous BID Evin Lopez, DO   3 Units at 05/17/21 2222    insulin lispro (HUMALOG) injection vial 0-3 Units  0-3 Units Subcutaneous Nightly Evin Lopez, DO   3 Units at 05/17/21 2222    insulin lispro (HUMALOG) injection vial 0-6 Units  0-6 Units Subcutaneous TID  Evin Lopez, DO   1 Units at 05/17/21 1311    levothyroxine (SYNTHROID) tablet 75 mcg  75 mcg Oral Daily Evin Lopez, DO   75 mcg at 05/17/21 0554    metoprolol tartrate (LOPRESSOR) tablet 25 mg  25 mg Oral Daily Evin Lopez, DO   25 mg at 05/17/21 0847    mirtazapine (REMERON) tablet 15 mg  15 mg Oral Nightly Evin Lopez, DO   15 mg at 05/17/21 2222    pantoprazole (PROTONIX) tablet 40 mg  40 mg Oral QAM  Evin Lopez, DO   40 mg at 05/17/21 0553    polyethylene glycol (GLYCOLAX) packet 17 g  17 g Oral Daily PRN Evin Lopez, DO        rOPINIRole (REQUIP) tablet 0.25 mg  0.25 mg Oral Nightly Evin Lopez, DO   0.25 mg at 05/17/21 2221    sevelamer (RENVELA) tablet 1,600 mg  1,600 mg Oral TID  Evin Lopez, DO   1,600 mg at 05/17/21 1730    sodium chloride flush 0.9 % injection 5-40 mL  5-40 mL Intravenous 2 times per day Evin Lopez, DO   10 mL at 05/17/21 2317    sodium chloride flush 0.9 % injection 5-40 mL  5-40 mL Intravenous PRN Evin Lopez, DO   10 mL at 05/16/21 1443    0.9 % sodium chloride infusion  25 mL Intravenous PRN Evin Lopez, DO        heparin (porcine) injection 5,000 Units  5,000 Units Subcutaneous 3 times per day [employer], DO   5,000 Units at 05/17/21 2222    promethazine (PHENERGAN) tablet 12.5 mg  12.5 mg Oral Q6H PRN Evin Jessica, DO   12.5 mg at 05/16/21 0757    Or    ondansetron (ZOFRAN) injection 4 mg  4 mg Intravenous Q6H PRN Evin Raspovic, DO   4 mg at 05/16/21 1333    acetaminophen (TYLENOL) tablet 650 mg  650 mg Oral Q6H PRN Evin Lopez, DO   650 mg at 05/15/21 0757    Or    acetaminophen (TYLENOL) suppository 650 mg  650 mg Rectal Q6H PRN Evin Luopooliver, DO        bisacodyl (DULCOLAX) EC tablet 5 mg  5 mg Oral Daily PRN Evin Lopez, DO        perflutren lipid microspheres (DEFINITY) injection 1.65 mg  1.5 mL Intravenous ONCE PRN Evin Lopez, DO           Allergies:     Allergies   Allergen Reactions    Cefepime Other (See Comments)     \" neurological side effect- slurred speech and confusion    Toradol [Ketorolac Tromethamine] Anaphylaxis and Hives       Problem List:    Patient Active Problem List   Diagnosis Code    Non compliance w medication regimen Z91.14    Tobacco smoking complicating pregnancy G51.656    MTHFR mutation ([facility]) E72.12    Hypertension I10    Iron deficiency anemia D50.9    Sinus tachycardia R00.0    Bladder dysfunction N31.9    Hypokalemia E87.6    Severe protein-calorie malnutrition ([facility]) E43    Uncontrolled type 1 diabetes mellitus with hyperglycemia, with long-term current use of insulin ([facility]) E10.65    ESRD on peritoneal dialysis ([facility]) N18.6, Z99.2    Hypothyroidism E03.9    Hyperlipidemia E78.5    Acute cystitis N30.00    Nondisplaced fracture of neck of fifth metacarpal bone, left hand, initial encounter for closed fracture S62.367A    Chronic right-sided low back pain M54.5, G89.29    Endocarditis I38    Seizure ([facility]) R56.9    Hyperkalemia E87.5    Hypomagnesemia E83.42    Hypocalcemia E83.51    Intractable nausea and vomiting R11.2    Wound of left leg, sequela S81.802S    Syncope R55       Past Medical History:        Diagnosis Date    Acute congestive heart failure (HCC)     BC (acute kidney injury) ([facility] 10/1/2019    Cardiac arrest ([facility] 2/15/2021    Cephalgia 10/9/2019    Chronic kidney disease     Depression     Diabetes mellitus ([facility]     Diabetic gastroparesis associated with type 1 diabetes mellitus ([facility] 12/17/2018  Diabetic ketoacidosis ([facility] 2011    Diabetic ketoacidosis with coma associated with type 1 diabetes mellitus ([facility] 2013    Diabetic polyneuropathy associated with type 1 diabetes mellitus ([facility] 2020    Drug use complicating pregnancy in third trimester     Endocarditis 10/31/2020    ESRD (end stage renal disease) ([facility] 2020    Hemodialysis patient ([facility]     History of blood transfusion 2019    Hyperlipidemia 10/8/2020    Hyperosmolar hyperglycemic state (HHS) ([facility] 2020    Hypothyroidism 10/8/2020    Iron deficiency anemia 10/1/2019    MDRO (multiple drug resistant organisms) resistance     MRSA (methicillin resistant Staphylococcus aureus)     back wound abcess    Non compliance w medication regimen 3/30/2016    Other disorders of kidney and ureter     Pregnancy 3/30/2016    16 weeks    Previous  delivery affecting pregnancy, antepartum 3/2/2017    Previous stillbirth or demise, antepartum 2016    Seizure ([facility] 2020    Severe pre-eclampsia in third trimester 2016    Shock liver 2/15/2021    Valvular endocarditis 11/10/2020    This Diagnosis was added to the Problem List based on transcribed orders from Dr. Amilcar Melchor          Past Surgical History:        Procedure Laterality Date    BACK SURGERY      abscess   [address]      x2    CHOLECYSTECTOMY, LAPAROSCOPIC N/A 2019    CHOLECYSTECTOMY LAPAROSCOPIC performed by Christine Pratt MD at [facility] 2018    COLONOSCOPY WITH BIOPSY performed by Dayna Tolentino MD at [address] [place] [address] N/A 2018    COLONOSCOPY WITH BIOPSY performed by Pauline Herrera MD at [address] [place] [address] ECHO COMPL [address]  2012    EF 57%    ECHO COMPL W DOP COLOR FLOW  6/10/2013         EMBOLECTOMY N/A 2020    ANGIOVAC, Darwin Nor, YULISSA -- REQS ROOM 3 performed by Little Garcia MD at [facility] Left 2021    LEFT LEG INCISION AND DRAINAGE, DEBRIDEMENT, WOUND VAC APPLICATION performed by Gabriel Rodriguez DPM at [facility] N/A 2020    LAPAROSCOPIC INSERTION PERITONEAL DIALYSIS CATHETER performed by Carol Page MD at [facility] ESOPHAGOGASTRODUODENOSCOPY TRANSORAL DIAGNOSTIC N/A 2018    EGD ESOPHAGOGASTRODUODENOSCOPY performed by Nito Santos MD at [address] [facility] TRANSESOPHAGEAL ECHOCARDIOGRAM N/A 10/19/2020    TRANSESOPHAGEAL ECHOCARDIOGRAM WITH BUBBLE STUDY performed by Rogelio Castañeda MD at [facility] [address] [address]  2018    UPPER GASTROINTESTINAL ENDOSCOPY  2018    EGD BIOPSY performed by Nakita Antonio MD at [facility] N/A 10/11/2019    EGD ESOPHAGOGASTRODUODENOSCOPY performed by Miranda Pacheco DO at [address] History:    Social History     Tobacco Use    Smoking status: Former Smoker     Packs/day: 0.50     Years: 6.00     Pack years: 3.00     Types: Cigarettes     Quit date: 2021     Years since quittin.3    Smokeless tobacco: Never Used    Tobacco comment: vaper cig   Substance Use Topics    Alcohol use:  No                                Counseling given: Not Answered  Comment: vaper cig      Vital Signs (Current):   Vitals:    21 1520 21 1930 21 2215 21 0230   BP: 113/75 117/76 126/79 (!) 142/89   Pulse: 102  108 99   Resp:    Temp: 98.4 °F (36.9 °C) 98.4 °F (36.9 °C)  97.9 °F (36.6 °C)   TempSrc: Oral Oral  Infrared   SpO2: 98% 98%     Weight:  141 lb 8.6 oz (64.2 kg)     Height:                                                  BP Readings from Last 3 Encounters:   21 (!) 142/89   21 109/80   21 105/73       NPO Status:                                                                                 BMI:   Wt Readings from Last 3 Encounters:   21 141 lb 8.6 oz (64.2 kg)   21 146 lb (66.2 kg) 02/26/21 164 lb 3.9 oz (74.5 kg)     Body mass index is 24.29 kg/m². CBC:   Lab Results   Component Value Date    WBC 7.4 05/18/2021    RBC 2.85 05/18/2021    HGB 8.4 05/18/2021    HCT 25.9 05/18/2021    MCV 90.9 05/18/2021    RDW 13.1 05/18/2021     05/18/2021       CMP:   Lab Results   Component Value Date     05/18/2021    K 3.0 05/18/2021    K 1.8 05/14/2021    CL 98 05/18/2021    CO2 28 05/18/2021    BUN 30 05/18/2021    CREATININE 8.4 05/18/2021    GFRAA 7 05/18/2021    LABGLOM 7 05/18/2021    GLUCOSE 206 05/18/2021    GLUCOSE 130 05/18/2012    PROT 4.1 05/14/2021    CALCIUM 8.6 05/18/2021    BILITOT <0.2 05/14/2021    ALKPHOS 115 05/14/2021    AST 5 05/14/2021    ALT 6 05/14/2021       POC Tests: No results for input(s): POCGLU, POCNA, POCK, POCCL, POCBUN, POCHEMO, POCHCT in the last 72 hours.     Coags:   Lab Results   Component Value Date    PROTIME 15.8 02/16/2021    PROTIME 13.6 08/14/2011    INR 1.3 02/16/2021    APTT 30.0 10/31/2020       HCG (If Applicable):   Lab Results   Component Value Date    PREGTESTUR NEGATIVE 11/03/2020    HCG 07002.4 (H) 06/26/2013        ABGs:   Lab Results   Component Value Date    PHART 7.345 07/20/2012    PO2ART 91.5 10/29/2019    ZHJ6JFX 35.0 10/29/2019    UVX2WWP 14.0 02/15/2021    H6EVEKOC 97.7 09/08/2012        Type & Screen (If Applicable):  Lab Results   Component Value Date    LABABO O 12/29/2011    [address] POSITIVE 12/29/2011       Drug/Infectious Status (If Applicable):  No results found for: HIV, HEPCAB    COVID-19 Screening (If Applicable):   Lab Results   Component Value Date    COVID19 Not Detected 05/14/2021    COVID19 Not Detected 11/25/2020           Anesthesia Evaluation  Patient summary reviewed and Nursing notes reviewed no history of anesthetic complications:   Airway: Mallampati: III  TM distance: <3 FB   Neck ROM: full  Mouth opening: > = 3 FB Dental:          Pulmonary:Negative Pulmonary ROS   (+) decreased breath sounds,      (-) not a current smoker (Former)                          PE comment: Fine bibasilar rales Cardiovascular:  Exercise tolerance: no interval change,   (+) hypertension: moderate, dysrhythmias: ventricular tachycardia, CHF:, murmur, hyperlipidemia        Rhythm: regular  Rate: abnormal           Beta Blocker:  Order written      ROS comment: Endocarditis with hx. Of cardiac arrest    Sinus tachycardia  Possible Left atrial enlargement  T wave abnormality, consider inferior ischemia  Abnormal ECG  When compared with ECG of 15-FEB-2021 08:49,  T wave inversion more evident in Inferior leads  Nonspecific T wave abnormality now evident in Lateral leads  QT has shortened    Moderate pulmonary HTN with a PASP of 55 mmHg    Summary  Normal left ventricular chamber size. Normal left ventricular systolic function, LVEF is 68%. Moderate left ventricular concentric hypertrophy noted. Normal diastolic function. Left atrium is of normal size. Interatrial septum not well visualized but appears intact. Normal right ventricle size and function. There is trace mitral regurgitation. No mitral valve prolapse. No hemodynamically significant aortic stenosis. Normal aortic root size. No evidence of pericardial effusion. No intra cardiac mass or thrombus. Compared to prior echo from Jan 2020 - Pulmonary Hypertension is new. PE comment: Tachycardia   Neuro/Psych:   (+) seizures:, neuromuscular disease (Polyneuropathy):, headaches:, psychiatric history: stable with treatmentdepression/anxiety             GI/Hepatic/Renal:   (+) GERD:, liver disease:, renal disease (Perinonteal dialysis with a creatinine of 8.4): ESRD,          ROS comment: Peritoneal dialysis recent finished up prior to coming down for surgery. Will proceed with MAC only given non equilibration of electrolytes and fluids post dialysis. .   Endo/Other:    (+) DiabetesType II DM, poorly controlled, using insulin, hypothyroidism, blood dyscrasia: anemia:., electrolyte abnormalities, . Pt had no PAT visit        ROS comment: RLS    Medical noncompliance Abdominal:           Vascular:   + PVD, aortic or cerebral, . Anesthesia Plan      MAC     ASA 4     (Pepcid 20mg and Reglan 10mg  Pt. Considered high risk for a low risk procedure)  Induction: intravenous. MIPS: Postoperative opioids intended and Prophylactic antiemetics administered. Anesthetic plan and risks discussed with patient. Plan discussed with CRNA. Gi William DO   5/18/2021    DOS STAFF ADDENDUM:    Pt seen and examined. Chart reviewed including anesthesia, medication, and allergy history. H&P reviewed. Physical exam updated. No interval changes to history or physical examination (unless noted above). NPO status confirmed. Anesthetic plan, risks, benefits, alternatives discussed with patient. Patient verbalized an understanding and agrees to proceed.      Gi William DO  Staff Anesthesiologist  7:10 AM

## 2021-05-18 NOTE — ANESTHESIA POSTPROCEDURE EVALUATION
Department of Anesthesiology  Postprocedure Note    Patient: Leila Barros  MRN: [id number]  YOB: 1990  Date of evaluation: 5/18/2021  Time:  8:28 AM     Procedure Summary     Date: 05/18/21 Room / Location: [address] [place] [address] / [address] [facility], [place], operated by [facility]    Anesthesia Start: 5082 Anesthesia Stop: 8991    Procedure: LEFT LEG DEBRIDEMENT BIOPSY POSS APPLICATION WOUND VAC (Left Leg Lower) Diagnosis: (LEFT LEG NECROSIS)    Surgeons: Owen Verdin DPM Responsible Provider: Evelia Oneil DO    Anesthesia Type: MAC ASA Status: 4          Anesthesia Type: MAC    Shilpa Phase I: Shilpa Score: 9    Shilpa Phase II:      Last vitals: Reviewed and per EMR flowsheets.        Anesthesia Post Evaluation    Patient location during evaluation: bedside  Patient participation: complete - patient participated  Level of consciousness: awake  Pain score: 3  Airway patency: patent  Nausea & Vomiting: no vomiting and no nausea  Complications: no  Cardiovascular status: hemodynamically stable  Respiratory status: acceptable  Hydration status: stable

## 2021-05-18 NOTE — HOME CARE
[address] received referral. Will follow after discharge. Spoke with patient and verified demographics. Reviewed IV pricing and patient is in agreement. Mabel Hernandez LPN, [facility] IV PRICING:  Sudha Sink % COVERAGE  Mabel Hernandez LPN, [facility].

## 2021-05-18 NOTE — BRIEF OP NOTE
Brief Postoperative Note      Patient: Josephine Masterson  YOB: 1990  MRN: [id number]    Date of Procedure: 5/18/2021    Pre-Op Diagnosis: LEFT LEG NECROSIS of muscle and graft    Post-Op Diagnosis: Same       Procedure(s):  LEFT LEG DEBRIDEMENT BIOPSY POSS APPLICATION WOUND VAC    Surgeon(s):  Costa Aldrich DPM    Assistant:  Resident: Heike Lord DPM    Anesthesia: Monitor Anesthesia Care    Estimated Blood Loss (mL): Minimal    Complications: None    Specimens:   ID Type Source Tests Collected by Time Destination   1 : LEFT LEG WOUND TISSUE FOR CULTURE-AEROBIC, ANAEROBIC, ACID FAST, GRAM STAIN, FUNGUS Tissue Leg CULTURE, ANAEROBIC, CULTURE, FUNGUS, GRAM STAIN, CULTURE, SURGICAL, CULTURE WITH SMEAR, ACID FAST BACILLIUS Costa Núñez DPM 5/18/2021 0727    A : LEFT LEG WOUND TISSUE FOR PATHOLOGY Specimen Leg SURGICAL PATHOLOGY Darlene Irene DPM 5/18/2021 0728        Implants:  Implant Name Type Inv. Item Serial No.  Lot No. LRB No. Used Action   DRESSING WND MICRONIZED PARTIC 500 MG [employer] - [id number]  DRESSING WND MICRONIZED PARTIC 500 MG [employer] [id number] [address] [place] [address] [id number] Left 1 Implanted         Drains:   [REMOVED] Negative Pressure Wound Therapy Leg Left (Removed)       Findings: Necrosis of graft and muscle. Adequate bleeding.     Electronically signed by Darlene Irene DPM on 5/18/2021 at 7:39 AM

## 2021-05-19 LAB
ALBUMIN SERPL-MCNC: 3.8 G/DL (ref 3.5–5.2)
ANION GAP SERPL CALCULATED.3IONS-SCNC: 11 MMOL/L (ref 7–16)
BLOOD CULTURE, ROUTINE: NORMAL
BUN BLDV-MCNC: 32 MG/DL (ref 6–20)
CALCIUM SERPL-MCNC: 8.8 MG/DL (ref 8.6–10.2)
CHLORIDE BLD-SCNC: 102 MMOL/L (ref 98–107)
CO2: 28 MMOL/L (ref 22–29)
CREAT SERPL-MCNC: 7.6 MG/DL (ref 0.5–1)
CULTURE, BLOOD 2: NORMAL
GFR AFRICAN AMERICAN: 8
GFR NON-AFRICAN AMERICAN: 8 ML/MIN/1.73
GLUCOSE BLD-MCNC: 251 MG/DL (ref 74–99)
GRAM STAIN ORDERABLE: NORMAL
HCT VFR BLD CALC: 25 % (ref 34–48)
HEMOGLOBIN: 8 G/DL (ref 11.5–15.5)
MCH RBC QN AUTO: 29 PG (ref 26–35)
MCHC RBC AUTO-ENTMCNC: 32 % (ref 32–34.5)
MCV RBC AUTO: 90.6 FL (ref 80–99.9)
METER GLUCOSE: 149 MG/DL (ref 74–99)
METER GLUCOSE: 231 MG/DL (ref 74–99)
METER GLUCOSE: 272 MG/DL (ref 74–99)
METER GLUCOSE: 447 MG/DL (ref 74–99)
PDW BLD-RTO: 13.1 FL (ref 11.5–15)
PHOSPHORUS: 4 MG/DL (ref 2.5–4.5)
PLATELET # BLD: 177 E9/L (ref 130–450)
PMV BLD AUTO: 9.5 FL (ref 7–12)
POTASSIUM SERPL-SCNC: 3.6 MMOL/L (ref 3.5–5)
RBC # BLD: 2.76 E12/L (ref 3.5–5.5)
SODIUM BLD-SCNC: 141 MMOL/L (ref 132–146)
VANCOMYCIN RANDOM: 16.1 MCG/ML (ref 5–40)
WBC # BLD: 7.2 E9/L (ref 4.5–11.5)

## 2021-05-19 PROCEDURE — P9047 ALBUMIN (HUMAN), 25%, 50ML: HCPCS | Performed by: INTERNAL MEDICINE

## 2021-05-19 PROCEDURE — 6360000002 HC RX W HCPCS: Performed by: INTERNAL MEDICINE

## 2021-05-19 PROCEDURE — 80202 ASSAY OF VANCOMYCIN: CPT

## 2021-05-19 PROCEDURE — 6370000000 HC RX 637 (ALT 250 FOR IP): Performed by: INTERNAL MEDICINE

## 2021-05-19 PROCEDURE — 99233 SBSQ HOSP IP/OBS HIGH 50: CPT | Performed by: INTERNAL MEDICINE

## 2021-05-19 PROCEDURE — 2060000000 HC ICU INTERMEDIATE R&B

## 2021-05-19 PROCEDURE — 2580000003 HC RX 258: Performed by: INTERNAL MEDICINE

## 2021-05-19 PROCEDURE — 6360000002 HC RX W HCPCS: Performed by: NURSE PRACTITIONER

## 2021-05-19 PROCEDURE — 6360000002 HC RX W HCPCS: Performed by: STUDENT IN AN ORGANIZED HEALTH CARE EDUCATION/TRAINING PROGRAM

## 2021-05-19 PROCEDURE — 6370000000 HC RX 637 (ALT 250 FOR IP): Performed by: STUDENT IN AN ORGANIZED HEALTH CARE EDUCATION/TRAINING PROGRAM

## 2021-05-19 PROCEDURE — 96372 THER/PROPH/DIAG INJ SC/IM: CPT

## 2021-05-19 PROCEDURE — 80069 RENAL FUNCTION PANEL: CPT

## 2021-05-19 PROCEDURE — 2580000003 HC RX 258: Performed by: STUDENT IN AN ORGANIZED HEALTH CARE EDUCATION/TRAINING PROGRAM

## 2021-05-19 PROCEDURE — 90945 DIALYSIS ONE EVALUATION: CPT

## 2021-05-19 PROCEDURE — 6370000000 HC RX 637 (ALT 250 FOR IP): Performed by: SPECIALIST

## 2021-05-19 PROCEDURE — 36415 COLL VENOUS BLD VENIPUNCTURE: CPT

## 2021-05-19 PROCEDURE — 85027 COMPLETE CBC AUTOMATED: CPT

## 2021-05-19 PROCEDURE — 96376 TX/PRO/DX INJ SAME DRUG ADON: CPT

## 2021-05-19 PROCEDURE — 82962 GLUCOSE BLOOD TEST: CPT

## 2021-05-19 RX ORDER — SULFAMETHOXAZOLE AND TRIMETHOPRIM 400; 80 MG/1; MG/1
1 TABLET ORAL DAILY
Qty: 14 TABLET | Refills: 0 | Status: SHIPPED | OUTPATIENT
Start: 2021-05-19 | End: 2021-06-02

## 2021-05-19 RX ORDER — SODIUM CHLORIDE 0.9 % (FLUSH) 0.9 %
5-40 SYRINGE (ML) INJECTION PRN
Status: DISCONTINUED | OUTPATIENT
Start: 2021-05-19 | End: 2021-05-20 | Stop reason: HOSPADM

## 2021-05-19 RX ORDER — SODIUM CHLORIDE 0.9 % (FLUSH) 0.9 %
5-40 SYRINGE (ML) INJECTION EVERY 12 HOURS SCHEDULED
Status: DISCONTINUED | OUTPATIENT
Start: 2021-05-19 | End: 2021-05-20 | Stop reason: HOSPADM

## 2021-05-19 RX ORDER — FOLIC ACID 1 MG/1
1 TABLET ORAL DAILY
Qty: 30 TABLET | Refills: 3 | Status: SHIPPED | OUTPATIENT
Start: 2021-05-19 | End: 2021-09-28 | Stop reason: ALTCHOICE

## 2021-05-19 RX ORDER — SODIUM CHLORIDE 9 MG/ML
25 INJECTION, SOLUTION INTRAVENOUS PRN
Status: DISCONTINUED | OUTPATIENT
Start: 2021-05-19 | End: 2021-05-20 | Stop reason: HOSPADM

## 2021-05-19 RX ORDER — FOLIC ACID 1 MG/1
1 TABLET ORAL DAILY
Status: DISCONTINUED | OUTPATIENT
Start: 2021-05-19 | End: 2021-05-20 | Stop reason: HOSPADM

## 2021-05-19 RX ORDER — SULFAMETHOXAZOLE AND TRIMETHOPRIM 400; 80 MG/1; MG/1
1 TABLET ORAL EVERY 12 HOURS SCHEDULED
Status: DISCONTINUED | OUTPATIENT
Start: 2021-05-19 | End: 2021-05-19

## 2021-05-19 RX ORDER — SULFAMETHOXAZOLE AND TRIMETHOPRIM 400; 80 MG/1; MG/1
1 TABLET ORAL DAILY
Status: DISCONTINUED | OUTPATIENT
Start: 2021-05-19 | End: 2021-05-20 | Stop reason: HOSPADM

## 2021-05-19 RX ADMIN — SEVELAMER CARBONATE 1600 MG: 800 TABLET, FILM COATED ORAL at 16:58

## 2021-05-19 RX ADMIN — SEVELAMER CARBONATE 1600 MG: 800 TABLET, FILM COATED ORAL at 08:35

## 2021-05-19 RX ADMIN — LEVOTHYROXINE SODIUM 75 MCG: 75 TABLET ORAL at 06:43

## 2021-05-19 RX ADMIN — HEPARIN SODIUM 5000 UNITS: 5000 INJECTION INTRAVENOUS; SUBCUTANEOUS at 06:46

## 2021-05-19 RX ADMIN — FENTANYL CITRATE 50 MCG: 50 INJECTION INTRAMUSCULAR; INTRAVENOUS at 11:47

## 2021-05-19 RX ADMIN — FENTANYL CITRATE 50 MCG: 50 INJECTION INTRAMUSCULAR; INTRAVENOUS at 06:44

## 2021-05-19 RX ADMIN — ACETAMINOPHEN 650 MG: 325 TABLET ORAL at 16:58

## 2021-05-19 RX ADMIN — METOPROLOL TARTRATE 25 MG: 25 TABLET, FILM COATED ORAL at 08:35

## 2021-05-19 RX ADMIN — SODIUM CHLORIDE, PRESERVATIVE FREE 10 ML: 5 INJECTION INTRAVENOUS at 08:34

## 2021-05-19 RX ADMIN — FENTANYL CITRATE 50 MCG: 50 INJECTION INTRAMUSCULAR; INTRAVENOUS at 21:36

## 2021-05-19 RX ADMIN — INSULIN LISPRO 2 UNITS: 100 INJECTION, SOLUTION INTRAVENOUS; SUBCUTANEOUS at 18:15

## 2021-05-19 RX ADMIN — FENTANYL CITRATE 50 MCG: 50 INJECTION INTRAMUSCULAR; INTRAVENOUS at 14:12

## 2021-05-19 RX ADMIN — SULFAMETHOXAZOLE AND TRIMETHOPRIM 1 TABLET: 400; 80 TABLET ORAL at 16:58

## 2021-05-19 RX ADMIN — FENTANYL CITRATE 50 MCG: 50 INJECTION INTRAMUSCULAR; INTRAVENOUS at 03:27

## 2021-05-19 RX ADMIN — ROPINIROLE HYDROCHLORIDE 0.25 MG: 0.25 TABLET, FILM COATED ORAL at 00:41

## 2021-05-19 RX ADMIN — HEPARIN SODIUM 5000 UNITS: 5000 INJECTION INTRAVENOUS; SUBCUTANEOUS at 00:39

## 2021-05-19 RX ADMIN — HYDRALAZINE HYDROCHLORIDE 10 MG: 10 TABLET, FILM COATED ORAL at 08:35

## 2021-05-19 RX ADMIN — HYDROXYZINE HYDROCHLORIDE 25 MG: 10 TABLET ORAL at 08:35

## 2021-05-19 RX ADMIN — LOPERAMIDE HYDROCHLORIDE 2 MG: 2 CAPSULE ORAL at 14:12

## 2021-05-19 RX ADMIN — HEPARIN 100 UNITS: 100 SYRINGE at 08:34

## 2021-05-19 RX ADMIN — HEPARIN SODIUM 5000 UNITS: 5000 INJECTION INTRAVENOUS; SUBCUTANEOUS at 14:11

## 2021-05-19 RX ADMIN — FOLIC ACID 1 MG: 1 TABLET ORAL at 16:16

## 2021-05-19 RX ADMIN — FENTANYL CITRATE 50 MCG: 50 INJECTION INTRAMUSCULAR; INTRAVENOUS at 09:32

## 2021-05-19 RX ADMIN — INSULIN LISPRO 3 UNITS: 100 INJECTION, SOLUTION INTRAVENOUS; SUBCUTANEOUS at 08:36

## 2021-05-19 RX ADMIN — ALBUMIN (HUMAN) 25 G: 0.25 INJECTION, SOLUTION INTRAVENOUS at 11:56

## 2021-05-19 RX ADMIN — INSULIN GLARGINE 3 UNITS: 100 INJECTION, SOLUTION SUBCUTANEOUS at 00:38

## 2021-05-19 RX ADMIN — ONDANSETRON 4 MG: 2 INJECTION INTRAMUSCULAR; INTRAVENOUS at 16:59

## 2021-05-19 RX ADMIN — DOCUSATE SODIUM 100 MG: 100 CAPSULE, LIQUID FILLED ORAL at 00:41

## 2021-05-19 RX ADMIN — ALBUMIN (HUMAN) 25 G: 0.25 INJECTION, SOLUTION INTRAVENOUS at 03:28

## 2021-05-19 RX ADMIN — INSULIN GLARGINE 3 UNITS: 100 INJECTION, SOLUTION SUBCUTANEOUS at 08:36

## 2021-05-19 RX ADMIN — HYDRALAZINE HYDROCHLORIDE 10 MG: 10 TABLET, FILM COATED ORAL at 00:41

## 2021-05-19 RX ADMIN — ARIPIPRAZOLE 5 MG: 5 TABLET ORAL at 00:41

## 2021-05-19 RX ADMIN — SODIUM CHLORIDE, PRESERVATIVE FREE 10 ML: 5 INJECTION INTRAVENOUS at 03:28

## 2021-05-19 RX ADMIN — FENTANYL CITRATE 50 MCG: 50 INJECTION INTRAMUSCULAR; INTRAVENOUS at 16:16

## 2021-05-19 RX ADMIN — POTASSIUM CHLORIDE 20 MEQ: 20 TABLET, EXTENDED RELEASE ORAL at 08:35

## 2021-05-19 RX ADMIN — INSULIN LISPRO 3 UNITS: 100 INJECTION, SOLUTION INTRAVENOUS; SUBCUTANEOUS at 00:39

## 2021-05-19 RX ADMIN — EPOETIN ALFA-EPBX 3000 UNITS: 3000 INJECTION, SOLUTION INTRAVENOUS; SUBCUTANEOUS at 10:02

## 2021-05-19 RX ADMIN — MIRTAZAPINE 15 MG: 15 TABLET, FILM COATED ORAL at 00:41

## 2021-05-19 RX ADMIN — PANTOPRAZOLE SODIUM 40 MG: 40 TABLET, DELAYED RELEASE ORAL at 06:43

## 2021-05-19 RX ADMIN — FENTANYL CITRATE 50 MCG: 50 INJECTION INTRAMUSCULAR; INTRAVENOUS at 19:16

## 2021-05-19 RX ADMIN — FENTANYL CITRATE 50 MCG: 50 INJECTION INTRAMUSCULAR; INTRAVENOUS at 01:03

## 2021-05-19 ASSESSMENT — PAIN DESCRIPTION - FREQUENCY
FREQUENCY: CONTINUOUS

## 2021-05-19 ASSESSMENT — PAIN - FUNCTIONAL ASSESSMENT
PAIN_FUNCTIONAL_ASSESSMENT: PREVENTS OR INTERFERES SOME ACTIVE ACTIVITIES AND ADLS

## 2021-05-19 ASSESSMENT — PAIN DESCRIPTION - PROGRESSION
CLINICAL_PROGRESSION: NOT CHANGED

## 2021-05-19 ASSESSMENT — PAIN SCALES - GENERAL
PAINLEVEL_OUTOF10: 8
PAINLEVEL_OUTOF10: 8
PAINLEVEL_OUTOF10: 6
PAINLEVEL_OUTOF10: 8
PAINLEVEL_OUTOF10: 3
PAINLEVEL_OUTOF10: 8
PAINLEVEL_OUTOF10: 9
PAINLEVEL_OUTOF10: 7
PAINLEVEL_OUTOF10: 8

## 2021-05-19 ASSESSMENT — PAIN DESCRIPTION - DESCRIPTORS
DESCRIPTORS: ACHING;CONSTANT;DISCOMFORT
DESCRIPTORS: ACHING;DISCOMFORT
DESCRIPTORS: ACHING;DISCOMFORT

## 2021-05-19 ASSESSMENT — PAIN DESCRIPTION - PAIN TYPE
TYPE: ACUTE PAIN;SURGICAL PAIN

## 2021-05-19 ASSESSMENT — PAIN DESCRIPTION - ONSET
ONSET: ON-GOING

## 2021-05-19 ASSESSMENT — PAIN DESCRIPTION - ORIENTATION
ORIENTATION: LEFT

## 2021-05-19 ASSESSMENT — PAIN DESCRIPTION - LOCATION
LOCATION: LEG

## 2021-05-19 NOTE — PROGRESS NOTES
acetaminophen **OR** acetaminophen, bisacodyl, perflutren lipid microspheres    PHYSICAL EXAM     BP (!) 164/88   Pulse 98   Temp 98.6 °F (37 °C) (Oral)   Resp 17   Ht 5' 4\" (1.626 m)   Wt 141 lb 8.6 oz (64.2 kg)   LMP  (LMP Unknown)   SpO2 99%   BMI 24.29 kg/m²   Temp  Av.3 °F (36.8 °C)  Min: 98 °F (36.7 °C)  Max: 98.6 °F (37 °C)  Constitutional:  The patient is awake, alert, and oriented. thon   Skin:    Warm and dry. No rashes were noted. HEENT:      AT/NC  Neck:     ij  Chest:   No use of accessory muscles to breathe. Symmetrical expansion. cta ant  Cardiovascular:  S1 and S2 are rhythmic and regular. Abdomen:    Benign to palpation. Pd cath  Extremities:    no edema. left dressed aced  CNS    AAxO   Lines: rij    DIAGNOSTIC RESULTS   Radiology:    Recent Labs     21  0450 21  0640   WBC 6.8 7.4 7.2   RBC 3.38* 2.85* 2.76*   HGB 9.9* 8.4* 8.0*   HCT 30.6* 25.9* 25.0*   MCV 90.5 90.9 90.6   MCH 29.3 29.5 29.0   MCHC 32.4 32.4 32.0   RDW 13.2 13.1 13.1    201 177   MPV 9.9 9.5 9.5     Recent Labs     21  0450 21  0640    138 141   K 3.3* 3.0* 3.6   CL 97* 98 102   CO2 26 28 28   BUN 28* 30* 32*   CREATININE 8.2* 8.4* 7.6*   GLUCOSE 349* 206* 251*   LABALBU 2.5* 3.0* 3.8   CALCIUM 8.4* 8.6 8.8     Lab Results   Component Value Date    CRP 12.8 (H) 2021    CRP 2.5 (H) 10/31/2020    CRP 43.1 (H) 2019     Lab Results   Component Value Date    SEDRATE 95 (H) 2021    SEDRATE 35 (H) 10/31/2020    SEDRATE 19 10/13/2020     No results for input(s): CRP, PROCAL, FERRITIN, LDH, TROPONINI, DDIMER, FIBRINOGEN, INR, PROTIME, AST, ALT, TRIG in the last 72 hours.   Lab Results   Component Value Date    CHOL 95 2020    TRIG 82 2020    HDL 33 2020    LDLCALC 46 2020    LABVLDL 16 2020     Lab Results   Component Value Date/Time    VITD25 7 (L) 2021 05:27 AM       Microbiology:   No results for input(s): COVID19 in the last 72 hours. Lab Results   Component Value Date    BC 24 Hours no growth 05/14/2021    BC 5 Days no growth 02/18/2021    BC 5 Days no growth 02/15/2021    BLOODCULT2 24 Hours no growth 05/14/2021    BLOODCULT2 5 Days no growth 02/18/2021    BLOODCULT2 5 Days no growth 02/15/2021    ORG Staphylococcus aureus 05/18/2021    ORG Staphylococcus aureus 05/16/2021    ORG Staphylococcus aureus 05/14/2021     WOUND/ABSCESS   Date Value Ref Range Status   05/16/2021   Final    Heavy growth  Refer to previous culture (CXWND: Leg-Left collected on 5-14-21 at 1426)  for susceptibility results     05/14/2021   Final    Heavy growth  Methicillin resistant Staph aureus isolated. Most Methicillin  resistant Staphylococcus are usually resistant to multiple  antibiotics including other B-Lactams, Aminoglycosides,  Macrolides, Clindamycin and Tetracycline. Contact isolation  is indicated. 02/22/2021 Growth not present  Final     Smear, Respiratory   Date Value Ref Range Status   02/18/2021   Final    Group 6: <25 PMN's/LPF and <25 Epithelial cells/LPF  Rare Polymorphonuclear leukocytes  Rare Epithelial cells  Few Gram positive diplococci  Rare Gram negative rods           Component Value Date/Time    FUNGSM No Fungal elements seen 11/06/2020 1142    LABFUNG No growth after 4 weeks of incubation.  11/06/2020 1142       MRSA Culture Only   Date Value Ref Range Status   12/27/2020 Methicillin resistant Staph aureus not isolated  Final     CULTURE, RESPIRATORY   Date Value Ref Range Status   02/18/2021 Oral Pharyngeal Celine reduced (A)  Final   02/18/2021 Rare growth  Final   02/18/2021 Moderate growth  Final     Recent Labs     05/18/21  0727   CXSURG Additional growth present, also evaluating for;  Mixed Gram positive organisms  *  Heavy growth  Sensitivity to follow     ORG Staphylococcus aureus*     Recent Labs     05/16/21  1530 05/18/21  0727   WNDABS Heavy growth  Refer to previous culture (CXWND: Leg-Left collected on 5-14-21 at 1426)  for susceptibility results    --    ORG Staphylococcus aureus* Staphylococcus aureus*           FINAL IMPRESSION    Left le surgical wound with MRSA  -LEFT LEG DEBRIDEMENT BIOPSY POSS APPLICATION WOUND VAC  urinary retention ckd/pd bladder scan 205cc  Check cx    Cont atbx  redose vanco today   Wound care  Can D/c with bactrim      vancomycin (VANCOCIN) intermittent dosing (placeholder), RX Placeholder       Check   path   Cont atbx  · Monitor labs    Imaging and labs were reviewed per medical records. An opportunity to ask questions was given to the patient/FAMILY. Thank you for involving me in the care of [address] [place] [address] I will continue to follow. Please do not hesitate to call for any questions or concerns.     Electronically signed by Cristina Vincent MD on 5/19/2021 at 11:44 AM

## 2021-05-19 NOTE — PROGRESS NOTES
Inpatient wound care note  RX filled out for [employer] home vac    Pt will have follow up with Dr Chanel Yadav at the wound care center  Pt will have homecare

## 2021-05-19 NOTE — PROGRESS NOTES
evidence of acute cardiopulmonary pathology.       2.  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 She reported having nausea but no fever or chills. Problems resolved:    · Altered mental status, resolved    IMPRESSION/RECOMMENDATIONS:      1. ESRD on PD, continue CCPD  2. Hypokalemia, 2/2 to K removal with PD   3. HTN, on hydralazine, metoprolol  4. MBD of CKD, on sevelamer and calcium acetate  5. Left leg wound + MRSA, on vancomycin  6. Gastroparesis  7. Anemia of CKD, to start epoetin alpha  8.  Left leg wound with MRSA, on piperacillin-tazobactam and vancomycin, s/p debridement with biopsy and wound vac application     Plan:    · Continue CCPD, 2 L exchanges x4/9 hours with last fill of 2 L, 1.5% at night and 2.5% long dwell  · Continue 20 mEq KCl p.o. daily  · Epoetin alpha 3,000 units subcutaneously three times weekly  · Complete albumin 25% 25g x6 doses  · Continue to monitor potassium level

## 2021-05-19 NOTE — FLOWSHEET NOTE
CCPD started; aseptically accessed LLQ PD catheter per [facility] policy; cycler orders checked and set up; aseptically connected patient to cycler and CCPD treatment started without complication; draining clear effluent; patient in

## 2021-05-19 NOTE — CARE COORDINATION
CM Note: 5/19/2021 at 3:59pm: COVID NEGATIVE - test done on 5/14. Talked with patient in her room for transition of care. States she will be staying at her mom's home when she is discharged. She again states she does not want to go to a facility. She gave me permission to call her mom to discuss transition of care. CM called patient's mom Brie Wood. She states patient will be staying with her and she does not want her to go to a facility. She states she is an RN. She states the patient already has a wound vac at home and they have all the supplies and she has been doing the dressings for the vac. Discussed that her daughter has chosen [facility] to follow. She states she had been doing the wound vac care, but was ok with them following. [facility] has accepted but states they can not come out until Friday and would need [place] [id number] orders. Talked to Sacha Myers at [facility] and she is aware of mom's address where the pt will be staying. MICHEL called Steff Sheth from [facility] who did confirm that patient did have a wound vac through them at home and is in good standing. He states he will put the vac on hold for billing purposes while she is in the hospital and when she is discharged, we would call Steff Sheth and he would reinstate the vac order. He states we would not need a new script for the wound vac. Requests that current measurements be faxed to him that were done by Paulino schneider. Will need to watch for any antibiotic needs. MICHEL / MINISTERIO to follow.  Manuelito Powell RN

## 2021-05-19 NOTE — PROGRESS NOTES
Pharmacy Consultation Note  (Antibiotic Dosing and Monitoring)    Initial consult date: 21  Consulting physician: Dr Cameron Sierra  Drug(s): Vancomycin IV  Indication: SSTI    Ht Readings from Last 1 Encounters:   21 5' 4\" (1.626 m)     Wt Readings from Last 1 Encounters:   21 141 lb 8.6 oz (64.2 kg)       Age/  Gender Actual BW IBW DW  Allergy Information   31 y.o.     female 66.2 kg 54.7 kg 59.3 kg  Cefepime and Toradol [ketorolac tromethamine]                 Date  WBC PD Drug/Dose Time   Given Level(s)   (Time) Comments     (#1) 5.3 9 hrs, 4 exachanges Vancomycin 1000 mg IV x 1 1720       (#2) 6.8 9 hrs, 4 exachanges No dose -- 27.0 mcg/mL @ 1340      (#3) 7.4 9 hrs, 4 exachanges No dose -- 20.5 mcg/mL @ 1203      (#4) 7.2 9 hrs, 4 exachanges No dose -- 16.1 mcg/mL @ 0640      (#5)           (#6)           (#7)           Estimated Creatinine Clearance: 10 mL/min (A) (based on SCr of 7.6 mg/dL (H)). UOP over the past 24 hours:       Intake/Output Summary (Last 24 hours) at 2021 1304  Last data filed at 2021 0923  Gross per 24 hour   Intake 1021.67 ml   Output --   Net 1021.67 ml       Temp max: Temp (24hrs), Av.3 °F (36.8 °C), Min:97.9 °F (36.6 °C), Max:98.6 °F (37 °C)      Antibiotic Regimen:  Antibiotic Dose Date Initiated Date Stopped   Pip/tazo 3.375 g Q12H      Cultures:  available culture and sensitivity results were reviewed in EPIC  Cultures sent and are pending. Culture Date Result    Blood cx #1  NGTD   Blood cx #2  NGTD   Wound cx; Leg  MRSA   Anaerobic cx; Leg  Not isolated   Covid-19  Not detected     Assessment:  · Consulted by Dr. Cameron Sierra to dose/monitor vancomycin  · Goal trough level:  15-20 mcg/mL  · Pt is a 30 y/o F with a significant medical history currently on peritoneal dialysis nightly. She presented for this admission with some AMS and electrolyte abnormalities.  During her admission, her pain in her leg has been

## 2021-05-19 NOTE — FLOWSHEET NOTE
05/19/21 0720   Vitals   BP (!) 166/98   Temp 97.9 °F (36.6 °C)   Pulse 100   Resp 14   Weight 141 lb 8.6 oz (64.2 kg)   Peritoneal Dialysis (CAPD manual)   Effluent Appearance Clear;Yellow   de accessed from CCPD using aseptic technique, machine did not give UF amount, tolerated well, site CDI, stable at dc, call light in reach

## 2021-05-19 NOTE — PROGRESS NOTES
Department of Podiatry  Resident Progress Note    Subjective  Patient seen bedside for S/p excisional wound debridement, bone biopsy and application of graft left leg on 21. No acute events overnight. Patient denies any N/V/D/F/C/SOB/CP and has no other complaints at this time.      Objective: Post op dressings CDI    Past Medical History:   Diagnosis Date    Acute congestive heart failure ([address]     BC (acute kidney injury) ([address] 10/1/2019    Cardiac arrest ([address] 2/15/2021    Cephalgia 10/9/2019    Chronic kidney disease     Depression     Diabetes mellitus ([address]     Diabetic gastroparesis associated with type 1 diabetes mellitus ([address] 2018    Diabetic ketoacidosis ([address] 2011    Diabetic ketoacidosis with coma associated with type 1 diabetes mellitus ([address] 2013    Diabetic polyneuropathy associated with type 1 diabetes mellitus ([address] 2020    Drug use complicating pregnancy in third trimester     Endocarditis 10/31/2020    ESRD (end stage renal disease) ([address] 2020    Hemodialysis patient ([address]     History of blood transfusion 2019    Hyperlipidemia 10/8/2020    Hyperosmolar hyperglycemic state (HHS) ([address] 2020    Hypothyroidism 10/8/2020    Iron deficiency anemia 10/1/2019    MDRO (multiple drug resistant organisms) resistance     MRSA (methicillin resistant Staphylococcus aureus)     back wound abcess    Non compliance w medication regimen 3/30/2016    Other disorders of kidney and ureter     Pregnancy 3/30/2016    16 weeks    Previous  delivery affecting pregnancy, antepartum 3/2/2017    Previous stillbirth or demise, antepartum 2016    Seizure ([address] 2020    Severe pre-eclampsia in third trimester 2016    Shock liver 2/15/2021    Valvular endocarditis 11/10/2020    This Diagnosis was added to the Problem List based on transcribed orders from Dr. Ruben Fall          Past Surgical History:   Procedure Laterality Date    BACK SURGERY      abscess     SECTION      x2    CHOLECYSTECTOMY, LAPAROSCOPIC N/A 2019    CHOLECYSTECTOMY LAPAROSCOPIC performed by Shella Skiff, MD at [address] N/A 2018    COLONOSCOPY WITH BIOPSY performed by Maninder Waite MD at [id number] [place] [address] COLONOSCOPY N/A 2018    COLONOSCOPY WITH BIOPSY performed by Suresh Lemons MD at [address]  2012    EF 57%    ECHO COMPL W DOP COLOR FLOW  6/10/2013         EMBOLECTOMY N/A 2020    [facility], [id number] [place] [address], YULISSA -- REQS ROOM 3 performed by Ghislaine Joyner MD at [place] [address] Left 2021    LEFT LEG INCISION AND DRAINAGE, DEBRIDEMENT, WOUND VAC APPLICATION performed by Umu Nichols DPM at [place] [address] Left 2021    LEFT LEG DEBRIDEMENT BIOPSY POSS APPLICATION WOUND VAC performed by Umu Nichols DPM at [address] [facility] N/A 2020    LAPAROSCOPIC INSERTION PERITONEAL DIALYSIS CATHETER performed by Jacobo Sanches MD at [facility] ESOPHAGOGASTRODUODENOSCOPY TRANSORAL DIAGNOSTIC N/A 2018    EGD ESOPHAGOGASTRODUODENOSCOPY performed by Maninder Waite MD at [address] [place] [address] TRANSESOPHAGEAL ECHOCARDIOGRAM N/A 10/19/2020    TRANSESOPHAGEAL ECHOCARDIOGRAM WITH BUBBLE STUDY performed by Juanita Wang MD at [address] [place] [address] TUNNELED VENOUS PORT PLACEMENT  2018    UPPER GASTROINTESTINAL ENDOSCOPY  2018    EGD BIOPSY performed by Suresh Lemons MD at [address] [place] [address] N/A 10/11/2019    EGD ESOPHAGOGASTRODUODENOSCOPY performed by Vlad Dowell DO at [facility] History   Problem Relation Age of Onset    Asthma Mother     Hypertension Mother     High Blood Pressure Mother     Diabetes Mother     Asthma Brother     High Blood Pressure Father        Allergies   Allergen Reactions    Cefepime Other (See Comments)     \" neurological side effect- slurred speech and confusion    Toradol [Ketorolac Tromethamine] Anaphylaxis and Hives       Objective: Post op dressings CDI, no strike through noted. Wound vac intact with good seal and minimal dressings noted in the cannister. No POP with posterior left leg squeeze. Gross sensation intact to toes.     CBC with Differential:    Lab Results   Component Value Date    WBC 7.2 05/19/2021    RBC 2.76 05/19/2021    HGB 8.0 05/19/2021    HCT 25.0 05/19/2021     05/19/2021    MCV 90.6 05/19/2021    MCH 29.0 05/19/2021    MCHC 32.0 05/19/2021    RDW 13.1 05/19/2021    NRBC 0.9 08/10/2020    SEGSPCT 67 06/27/2013    METASPCT 1.7 08/10/2020    LYMPHOPCT 38.7 05/15/2021    MONOPCT 5.2 05/15/2021    BASOPCT 1.4 05/15/2021    MONOSABS 0.33 05/15/2021    LYMPHSABS 2.46 05/15/2021    EOSABS 0.25 05/15/2021    BASOSABS 0.09 05/15/2021     Hemoglobin/Hematocrit:    Lab Results   Component Value Date    HGB 8.0 05/19/2021    HCT 25.0 05/19/2021       Scheduled Meds:   sodium chloride flush  5-40 mL Intravenous 2 times per day    albumin human  25 g Intravenous Q8H    vancomycin (VANCOCIN) intermittent dosing (placeholder)   Other RX Placeholder    potassium chloride  20 mEq Oral Daily with breakfast    ARIPiprazole  5 mg Oral Nightly    docusate sodium  100 mg Oral BID    hydrALAZINE  10 mg Oral BID    hydrOXYzine  25 mg Oral Daily    insulin glargine  3 Units Subcutaneous BID    insulin lispro  0-3 Units Subcutaneous Nightly    insulin lispro  0-6 Units Subcutaneous TID WC    levothyroxine  75 mcg Oral Daily    metoprolol tartrate  25 mg Oral Daily    mirtazapine  15 mg Oral Nightly    pantoprazole  40 mg Oral QAM AC    rOPINIRole  0.25 mg Oral Nightly    sevelamer  1,600 mg Oral TID WC    heparin (porcine)  5,000 Units Subcutaneous 3 times per day     Continuous Infusions:   sodium chloride      dextrose       PRN Meds:.sodium chloride flush, sodium chloride, heparin flush, oxyCODONE **OR** oxyCODONE, fentanNYL, dicyclomine, prochlorperazine, loperamide, albuterol, glucose, dextrose, glucagon (rDNA), dextrose, polyethylene glycol, promethazine **OR** ondansetron, acetaminophen **OR** acetaminophen, bisacodyl, perflutren lipid microspheres    Diagnostics      Assessment  1. S/p excisional wound debridement, bone biopsy and application of graft left leg on 5/18/21.  2. Left leg ulceration with necrosis of muscle. 3. Skin graft necrosis/failure. 4. Gangrene, left leg. 5. Questionable pyoderma gangrenosum. Plan  - Patient was examined and evaluated. - Reviewed patient's recent lab results and pertinent diagnostic imaging.  - Reviewed ancillary service notes. - Wound vac and post op dressing left intact, next change will be Friday  - Surgical cultures pending  - ID following, abx per recs  - Will continue to follow patient while they are in-house.

## 2021-05-19 NOTE — PROGRESS NOTES
[address] [place], [place] [address]ist   Progress Note    Admitting Date and Time: 5/14/2021  1:56 PM  Admit Dx: Intractable nausea and vomiting [R11.2]  Hypokalemia [E87.6]    Subjective/interval history:    5/15: Pt seen and examined. RN present at bedside. Today she is awake, alert, oriented x3. Still having severe pain in her left leg. She has been taking oxycodone for this prescribed by her podiatrist, but states that it makes her very drowsy. 5/16: Patient seen and examined. She is very tearful this morning, states that her left leg pain is uncontrolled which is subsequently making her nauseous. She is also having some abdominal cramping.    5/17: Gotten called in the patient's room where she got lightheaded while on the commode. .  With the help of nursing got her to the wheelchair and then back into bed. We put her bed in Trendelenburg and gave her 500 cc fluid bolus. She got back to her baseline mentation and blood pressure improved from 74/44 to 113/58    5/18: Patient had surgery this morning on left lower leg. Wound VAC was placed. She is now back in the room. Patient states she wants to be home for her birthday on Thursday. 5/19: Having trouble keeping down pills. States she gets sick to her stomach.     Per RN: patient not tolerating oral pain meds and is still using IV Fentanyl,       sodium chloride flush  5-40 mL Intravenous 2 times per day    epoetin nena-epbx  3,000 Units Subcutaneous Once per day on Mon Wed Fri    [START ON 5/20/2021] vancomycin  750 mg Intravenous Once    folic acid  1 mg Oral Daily    sulfamethoxazole-trimethoprim  1 tablet Oral Daily    vancomycin (VANCOCIN) intermittent dosing (placeholder)   Other RX Placeholder    potassium chloride  20 mEq Oral Daily with breakfast    ARIPiprazole  5 mg Oral Nightly    docusate sodium  100 mg Oral BID    hydrALAZINE  10 mg Oral BID    hydrOXYzine  25 mg Oral Daily    insulin glargine  3 Units Subcutaneous BID 05/17/21 0447 05/18/21  0450 05/19/21  0640    138 141   K 3.3* 3.0* 3.6   CL 97* 98 102   CO2 26 28 28   BUN 28* 30* 32*   CREATININE 8.2* 8.4* 7.6*   GLUCOSE 349* 206* 251*   CALCIUM 8.4* 8.6 8.8       Recent Labs     05/17/21 0447 05/18/21  0450 05/19/21  0640   LABALBU 2.5* 3.0* 3.8       Recent Labs     05/17/21 0447 05/18/21  0450 05/19/21  0640   WBC 6.8 7.4 7.2   RBC 3.38* 2.85* 2.76*   HGB 9.9* 8.4* 8.0*   HCT 30.6* 25.9* 25.0*   MCV 90.5 90.9 90.6   MCH 29.3 29.5 29.0   MCHC 32.4 32.4 32.0   RDW 13.2 13.1 13.1    201 177   MPV 9.9 9.5 9.5           Radiology:   XR TIBIA FIBULA LEFT (2 VIEWS)   Final Result   1. Surgical staples along the medial left shin. Mild soft tissue stranding   in this region. No evidence of subcutaneous air. 2.  No osseous abnormality seen about the left tibia or fibula on this exam.   No definite evidence osseous destruction. XR CHEST PORTABLE   Final Result   1. No evidence of acute cardiopulmonary pathology. 2.  Right internal jugular central venous catheter             Assessment:  Principal Problem:    Hypokalemia  Active Problems:    Uncontrolled type 1 diabetes mellitus with hyperglycemia, with long-term current use of insulin (HCC)    ESRD on peritoneal dialysis ([facility]    Acute cystitis    Hypertension    Hypomagnesemia    Hypocalcemia    Intractable nausea and vomiting    Wound of left leg, sequela    Syncope  Resolved Problems:    * No resolved hospital problems. *      1. Hypokalemia/hypomagnesemia/hypocalcemia   -Improved from admission with IV and oral replacement  -Monitor electrolytes and replete as needed     2. Intractable nausea/vomiting with abdominal cramping   -Electrolyte derangements have improved. Concern for opioid withdrawal; she was taking Percocet regularly at home and ran out. She also states that her nausea seems to come on when her pain increases.   Given she does have a significant left lower extremity

## 2021-05-20 VITALS
HEIGHT: 64 IN | HEART RATE: 108 BPM | WEIGHT: 140.9 LBS | RESPIRATION RATE: 16 BRPM | DIASTOLIC BLOOD PRESSURE: 109 MMHG | TEMPERATURE: 98.4 F | SYSTOLIC BLOOD PRESSURE: 157 MMHG | OXYGEN SATURATION: 96 % | BODY MASS INDEX: 24.05 KG/M2

## 2021-05-20 LAB
ALBUMIN SERPL-MCNC: 3.9 G/DL (ref 3.5–5.2)
ANAEROBIC CULTURE: NORMAL
ANION GAP SERPL CALCULATED.3IONS-SCNC: 13 MMOL/L (ref 7–16)
BUN BLDV-MCNC: 33 MG/DL (ref 6–20)
CALCIUM SERPL-MCNC: 9.1 MG/DL (ref 8.6–10.2)
CHLORIDE BLD-SCNC: 99 MMOL/L (ref 98–107)
CO2: 27 MMOL/L (ref 22–29)
CREAT SERPL-MCNC: 7.5 MG/DL (ref 0.5–1)
CULTURE SURGICAL: ABNORMAL
CULTURE SURGICAL: ABNORMAL
GFR AFRICAN AMERICAN: 8
GFR NON-AFRICAN AMERICAN: 8 ML/MIN/1.73
GLUCOSE BLD-MCNC: 216 MG/DL (ref 74–99)
HCT VFR BLD CALC: 27.5 % (ref 34–48)
HEMOGLOBIN: 8.8 G/DL (ref 11.5–15.5)
MCH RBC QN AUTO: 29.4 PG (ref 26–35)
MCHC RBC AUTO-ENTMCNC: 32 % (ref 32–34.5)
MCV RBC AUTO: 92 FL (ref 80–99.9)
METER GLUCOSE: 205 MG/DL (ref 74–99)
METER GLUCOSE: 284 MG/DL (ref 74–99)
ORGANISM: ABNORMAL
ORGANISM: ABNORMAL
PDW BLD-RTO: 13.4 FL (ref 11.5–15)
PHOSPHORUS: 4.3 MG/DL (ref 2.5–4.5)
PLATELET # BLD: 186 E9/L (ref 130–450)
PMV BLD AUTO: 10.1 FL (ref 7–12)
POTASSIUM SERPL-SCNC: 4 MMOL/L (ref 3.5–5)
RBC # BLD: 2.99 E12/L (ref 3.5–5.5)
SODIUM BLD-SCNC: 139 MMOL/L (ref 132–146)
WBC # BLD: 6.9 E9/L (ref 4.5–11.5)

## 2021-05-20 PROCEDURE — 6360000002 HC RX W HCPCS: Performed by: STUDENT IN AN ORGANIZED HEALTH CARE EDUCATION/TRAINING PROGRAM

## 2021-05-20 PROCEDURE — 2580000003 HC RX 258: Performed by: STUDENT IN AN ORGANIZED HEALTH CARE EDUCATION/TRAINING PROGRAM

## 2021-05-20 PROCEDURE — 82962 GLUCOSE BLOOD TEST: CPT

## 2021-05-20 PROCEDURE — 80069 RENAL FUNCTION PANEL: CPT

## 2021-05-20 PROCEDURE — 96376 TX/PRO/DX INJ SAME DRUG ADON: CPT

## 2021-05-20 PROCEDURE — 6370000000 HC RX 637 (ALT 250 FOR IP): Performed by: STUDENT IN AN ORGANIZED HEALTH CARE EDUCATION/TRAINING PROGRAM

## 2021-05-20 PROCEDURE — 96372 THER/PROPH/DIAG INJ SC/IM: CPT

## 2021-05-20 PROCEDURE — 6370000000 HC RX 637 (ALT 250 FOR IP): Performed by: SPECIALIST

## 2021-05-20 PROCEDURE — 99239 HOSP IP/OBS DSCHRG MGMT >30: CPT | Performed by: INTERNAL MEDICINE

## 2021-05-20 PROCEDURE — 36415 COLL VENOUS BLD VENIPUNCTURE: CPT

## 2021-05-20 PROCEDURE — 85027 COMPLETE CBC AUTOMATED: CPT

## 2021-05-20 RX ORDER — OXYCODONE HYDROCHLORIDE AND ACETAMINOPHEN 5; 325 MG/1; MG/1
1 TABLET ORAL EVERY 6 HOURS PRN
Qty: 12 TABLET | Refills: 0 | Status: SHIPPED | OUTPATIENT
Start: 2021-05-20 | End: 2021-05-23

## 2021-05-20 RX ADMIN — METOPROLOL TARTRATE 25 MG: 25 TABLET, FILM COATED ORAL at 09:21

## 2021-05-20 RX ADMIN — ROPINIROLE HYDROCHLORIDE 0.25 MG: 0.25 TABLET, FILM COATED ORAL at 00:12

## 2021-05-20 RX ADMIN — FENTANYL CITRATE 50 MCG: 50 INJECTION INTRAMUSCULAR; INTRAVENOUS at 00:11

## 2021-05-20 RX ADMIN — INSULIN LISPRO 2 UNITS: 100 INJECTION, SOLUTION INTRAVENOUS; SUBCUTANEOUS at 09:23

## 2021-05-20 RX ADMIN — HEPARIN SODIUM 5000 UNITS: 5000 INJECTION INTRAVENOUS; SUBCUTANEOUS at 00:14

## 2021-05-20 RX ADMIN — FENTANYL CITRATE 50 MCG: 50 INJECTION INTRAMUSCULAR; INTRAVENOUS at 07:10

## 2021-05-20 RX ADMIN — PANTOPRAZOLE SODIUM 40 MG: 40 TABLET, DELAYED RELEASE ORAL at 07:10

## 2021-05-20 RX ADMIN — HYDRALAZINE HYDROCHLORIDE 10 MG: 10 TABLET, FILM COATED ORAL at 09:22

## 2021-05-20 RX ADMIN — DOCUSATE SODIUM 100 MG: 100 CAPSULE, LIQUID FILLED ORAL at 00:12

## 2021-05-20 RX ADMIN — INSULIN GLARGINE 3 UNITS: 100 INJECTION, SOLUTION SUBCUTANEOUS at 09:22

## 2021-05-20 RX ADMIN — LEVOTHYROXINE SODIUM 75 MCG: 75 TABLET ORAL at 07:10

## 2021-05-20 RX ADMIN — POTASSIUM CHLORIDE 20 MEQ: 20 TABLET, EXTENDED RELEASE ORAL at 09:21

## 2021-05-20 RX ADMIN — HEPARIN SODIUM 5000 UNITS: 5000 INJECTION INTRAVENOUS; SUBCUTANEOUS at 07:10

## 2021-05-20 RX ADMIN — FOLIC ACID 1 MG: 1 TABLET ORAL at 09:22

## 2021-05-20 RX ADMIN — ONDANSETRON 4 MG: 2 INJECTION INTRAMUSCULAR; INTRAVENOUS at 09:22

## 2021-05-20 RX ADMIN — HYDRALAZINE HYDROCHLORIDE 10 MG: 10 TABLET, FILM COATED ORAL at 00:12

## 2021-05-20 RX ADMIN — INSULIN GLARGINE 3 UNITS: 100 INJECTION, SOLUTION SUBCUTANEOUS at 00:15

## 2021-05-20 RX ADMIN — FENTANYL CITRATE 50 MCG: 50 INJECTION INTRAMUSCULAR; INTRAVENOUS at 04:11

## 2021-05-20 RX ADMIN — SEVELAMER CARBONATE 1600 MG: 800 TABLET, FILM COATED ORAL at 09:22

## 2021-05-20 RX ADMIN — HYDROXYZINE HYDROCHLORIDE 25 MG: 10 TABLET ORAL at 09:21

## 2021-05-20 RX ADMIN — INSULIN LISPRO 2 UNITS: 100 INJECTION, SOLUTION INTRAVENOUS; SUBCUTANEOUS at 00:14

## 2021-05-20 RX ADMIN — ARIPIPRAZOLE 5 MG: 5 TABLET ORAL at 00:12

## 2021-05-20 RX ADMIN — FENTANYL CITRATE 50 MCG: 50 INJECTION INTRAMUSCULAR; INTRAVENOUS at 11:51

## 2021-05-20 RX ADMIN — FENTANYL CITRATE 50 MCG: 50 INJECTION INTRAMUSCULAR; INTRAVENOUS at 09:23

## 2021-05-20 RX ADMIN — MIRTAZAPINE 15 MG: 15 TABLET, FILM COATED ORAL at 00:12

## 2021-05-20 RX ADMIN — SODIUM CHLORIDE, PRESERVATIVE FREE 10 ML: 5 INJECTION INTRAVENOUS at 00:13

## 2021-05-20 ASSESSMENT — PAIN DESCRIPTION - PAIN TYPE: TYPE: CHRONIC PAIN

## 2021-05-20 ASSESSMENT — PAIN SCALES - GENERAL
PAINLEVEL_OUTOF10: 8
PAINLEVEL_OUTOF10: 8
PAINLEVEL_OUTOF10: 7
PAINLEVEL_OUTOF10: 8

## 2021-05-20 ASSESSMENT — PAIN DESCRIPTION - DESCRIPTORS: DESCRIPTORS: THROBBING;DISCOMFORT

## 2021-05-20 ASSESSMENT — PAIN DESCRIPTION - LOCATION: LOCATION: LEG

## 2021-05-20 ASSESSMENT — PAIN DESCRIPTION - PROGRESSION: CLINICAL_PROGRESSION: NOT CHANGED

## 2021-05-20 NOTE — DISCHARGE SUMMARY
[facility] Physician Discharge Summary       Raj Galeas, [address] [facility] [address]  [phone]    Schedule an appointment as soon as possible for a visit in 1 week  Hospital follow up    Neva Mckoy [address] [place] [id number]  [phone]    Go on 5/24/2021  Wound clinic appointment. Activity level: As tolerated; keep left leg elevated when in bed or seated. Diet: DIET GENERAL;    Labs:as per nephrology    Condition at discharge: Stable    Dispo:Home    Patient ID:  Kerri Schmitz  [id number]  36 y.o.  1990    Admit date: 5/14/2021    Discharge date and time:  5/20/2021  9:49 AM    Admission Diagnoses: Principal Problem:    Hypokalemia  Active Problems:    Uncontrolled type 1 diabetes mellitus with hyperglycemia, with long-term current use of insulin (HCC)    ESRD on peritoneal dialysis ([address]    Acute cystitis    Hypertension    Hypomagnesemia    Hypocalcemia    Intractable nausea and vomiting    Wound of left leg, sequela    Syncope  Resolved Problems:    * No resolved hospital problems. *      Discharge Diagnoses: Principal Problem:    Hypokalemia  Active Problems:    Uncontrolled type 1 diabetes mellitus with hyperglycemia, with long-term current use of insulin (HCC)    ESRD on peritoneal dialysis ([address]    Acute cystitis    Hypertension    Hypomagnesemia    Hypocalcemia    Intractable nausea and vomiting    Wound of left leg, sequela    Syncope  Resolved Problems:    * No resolved hospital problems. *      Consults:  IP CONSULT TO NEPHROLOGY  IP CONSULT TO NEPHROLOGY  IP CONSULT TO INFECTIOUS DISEASES  IP CONSULT TO PODIATRY  IP CONSULT TO PHARMACY    Procedures: 5/18 excisional left led wound debridement, biopsy of left leg wound application of skin substitute collagen graft, and wound VAC application.      History of Present Illness:  31 y.o. female with a history of end-stage renal disease on peritoneal dialysis, type 1 diabetes mellitus, diastolic heart failure with  in February with cardiac arrest.She is lethargic, does not provide much history. Over the past day she has had nausea, vomiting, shortness of breath, and syncope. States that she has been \"passing out\" several times per day. Denies any chest pain, orthopnea, visual changes or hearing changes during the episodes. Denies any fevers, chills, known sick contacts.     In the ED, ABG only showed slight alkalemia and PaO2 of 161.5. COVID-19 test negative. Chest x-ray unremarkable. Beta hydroxybutyrate was not elevated. BMP showed significant electrolyte derangement with potassium of 1.8, chloride of 113, magnesium 1.2, and calcium of 5.7. She was given a 1 L bolus normal saline, 2 g magnesium sulfate, and 20 mEq of potassium chloride IV. Repeat BMP 3 hours after this shows potassium of 3.5, calcium of 9.4.     She also has a 6 cm x 3 cm wound on the left tibia with small amount of purulent drainage seen in the ED. She has been following with podiatry for this.     Hospital Course: 35 yo female admitted as per above details. See outline below for additional details and issues addressed this admission:     1.    Hypokalemia/hypomagnesemia/hypocalcemia   -Improved from admission with IV and oral replacement  -Monitor electrolytes and replete as needed     2.    Intractable nausea/vomiting with abdominal cramping   -Electrolyte derangements have improved. Concern for opioid withdrawal; she was taking Percocet regularly at home and ran out. She also states that her nausea seems to come on when her pain increases. Given she does have a significant left lower extremity wound, will treat with low-dose as needed fentanyl for now.  -Abdominal exam is benign  -As needed Bentyl for abdominal cramping while in house     3. Acute cystitis/UTI   -UA packed with WBCs  -started IV Zosyn 5/16 and discontinued 5/18  -Urine culture <10,000.   Mixed gram-positive organisms     4.    Syncope  -Echocardiogram completed and shows that ejection fraction remains normal, and it was noted that there were no significant changes from 2/22/2021.  -Telemetry reviewed, and there has been no arrhythmias     5.    ESRD on peritoneal dialysis  -Nephrology following for peritoneal dialysis management     6.    Type I DM  -Continue home basal, prandial, and corrective scale insulin regimen     7.    LLE wound  -Concern for recurrent infection vs pyoderma gangrenosum  -This has been present since February.  At that time, she had surgical debridement was on broad-spectrum antibiotic coverage.  She is afebrile, no leukocytosis, and wound does not appear to have any significant rounding erythema. However, wound is growing MRSA. Unclear if this is colonization versus true infection. She was given of IV vancomycin and ID was consulted. Patient remains on vancomycin but Zosyn stopped Wednesday. Plan to stop Vanco at discharge and start Bactrim. -Patient underwent debridement in the OR Tues morning. Wound VAC was placed. Wound VAC removed today for discharge and will be reapplied at home.  -Home-going Rx for Percocet 5/325 #12/NR. Reviewed OARRS report prior to prescribing.       Discharge Exam:  Vitals:    05/19/21 1800 05/19/21 2130 05/20/21 0000 05/20/21 0145   BP: (!) 156/100 (!) 150/84 (!) 172/98    Pulse: 107 101 105    Resp: 20 20 16    Temp:  98.3 °F (36.8 °C) 97.3 °F (36.3 °C)    TempSrc:  Oral Oral    SpO2:  100% 100%    Weight:    140 lb 14.4 oz (63.9 kg)   Height:         General Appearance:  Alert and oriented x3 sitting up in bed.   Skin: warm and dry  Head: normocephalic and atraumatic  Eyes: pupils equal, round, and reactive to light, extraocular eye movements intact, conjunctivae normal  Neck: neck supple and non tender without mass   Pulmonary/Chest: clear to auscultation bilaterally- no wheezes, rales or rhonchi, normal air movement, no respiratory distress  Cardiovascular: normal rate, normal S1 and S2 and no carotid bruits  Abdomen: soft, non-tender, non-distended, normal bowel sounds, no masses or organomegaly  Extremities: Left lower extremity bandaged with wound VAC no longer in place. Photo of wound reviewed. Neurologic: no cranial nerve deficit and speech normal     I/O last 3 completed shifts: In: 80 [P.O.:480; IV Piggyback:100]  Out: 200 [Urine:200]  No intake/output data recorded. LABS:  Recent Labs     05/18/21 0450 05/19/21  0640 05/20/21  0729    141 139   K 3.0* 3.6 4.0   CL 98 102 99   CO2 28 28 27   BUN 30* 32* 33*   CREATININE 8.4* 7.6* 7.5*   GLUCOSE 206* 251* 216*   CALCIUM 8.6 8.8 9.1       Recent Labs     05/18/21 0450 05/19/21 0640 05/20/21  0729   WBC 7.4 7.2 6.9   RBC 2.85* 2.76* 2.99*   HGB 8.4* 8.0* 8.8*   HCT 25.9* 25.0* 27.5*   MCV 90.9 90.6 92.0   MCH 29.5 29.0 29.4   MCHC 32.4 32.0 32.0   RDW 13.1 13.1 13.4    177 186   MPV 9.5 9.5 10.1       Culture, Blood 1 [[id number]] Collected: 05/14/21 1426     Specimen: Blood Updated: 05/19/21 1735      Blood Culture, Routine 5 Days no growth     Narrative:       Source: BLOOD       Site: No site given                Culture, Blood 2 [[id number]] Collected: 05/14/21 1426     Specimen: Blood Updated: 05/19/21 1735      Culture, Blood 2 5 Days no growth     Narrative:       Source: BLOOD       Site: No site given            Culture, Surgical [[id number]] (Abnormal) Collected: 05/18/21 0727     Specimen: Tissue from Leg Updated: 05/19/21 1052      Culture Surgical --      Additional growth present, also evaluating for;   Mixed Gram positive organisms       Organism Staphylococcus aureus      Culture Surgical --      Heavy growth   Sensitivity to follow      Narrative:       Source: TISSU       Site: LEFT LEG           Imaging:      TTE procedure:Echo Limited Study.      Procedure Date  Date: 05/15/2021 Start: 12:06 PM     Study Location: Portable  Technical Quality: Adequate visualization     Indications:LV function.     Patient Status: Routine     Height: 64 inches Weight: 146 pounds BSA: 1.71 m^2 BMI: 25.06 kg/m^2     Rhythm: Within normal limits HR: 85 bpm BP: 114/74 mmHg      Findings      Left Ventricle   Normal left ventricular chamber size. Normal left ventricular systolic function, LVEF is 60-65%. Right Ventricle   Normal right ventricle size and function. Left Atrium   Left atrium is of normal size. Aortic Valve   . Pericardial Effusion   No evidence of pericardial effusion. Aorta   Normal aortic root size. Conclusions      Summary   Limited Echo for LV function. Normal left ventricular chamber size and systolic function, LVEF is   60-65%. Normal right ventricle size and function. Normal aortic root size. No evidence of pericardial effusion. No intra cardiac mass or thrombus. Compared to prior echo from 2/22/21, no significant changes noted. XR TIBIA FIBULA LEFT (2 VIEWS)    Result Date: 5/14/2021  EXAMINATION: XRAY VIEWS OF THE LEFT TIBIA AND FIBULA 5/14/2021 3:55 pm COMPARISON: Left tibia and fibula series from February 22, 2021 HISTORY: ORDERING SYSTEM PROVIDED HISTORY: Superficial infection left tibia with staples in place, evaluate for subcutaneous air or evidence of osteomyelitis TECHNOLOGIST PROVIDED HISTORY: Reason for exam:->Superficial infection left tibia with staples in place, evaluate for subcutaneous air or evidence of osteomyelitis FINDINGS: There are surgical staples along the medial aspect of the mid left shin. Mild soft tissue stranding in this region. There is no evidence of subcutaneous air. No cortical irregularities nor abnormal periosteal elevation along the course of the left tibia or fibula. No evidence of osseous destruction or osseous erosions. Limited evaluation of the left knee and ankle joints are unremarkable.   Osseous mineralization is normal.     1.  Surgical staples along the medial left lispro (HUMALOG) 100 UNIT/ML injection vial Inject 0-3 Units into the skin nightly **Corrective Bedtime (50%) Low Dose Algorithm** Glucose: Dose:  - No Insulin, 140-249 1 Unit, 250-349 2 Units, Over 350 3 Units  Qty: 1 vial, Refills: 3      !! insulin lispro (HUMALOG) 100 UNIT/ML injection vial Inject 0-6 Units into the skin 3 times daily (with meals) **Corrective Low Dose Algorithm** Glucose: Dose: -No Insulin, 140-199 - 1 Unit, 200-249 - 2 Units, 250-299 - 3 Units, 300-349 - 4 Units, 350-399 - 5 Units, Over 399 - 6 Units  Qty: 1 vial, Refills: 3      hydrOXYzine (ATARAX) 25 MG tablet Take 25 mg by mouth daily      polyethylene glycol (GLYCOLAX) 17 g packet Take 17 g by mouth daily as needed for Constipation      albuterol (PROVENTIL) (2.5 MG/3ML) 0.083% nebulizer solution Take 2.5 mg by nebulization every 6 hours as needed for Wheezing      ARIPiprazole (ABILIFY) 5 MG tablet Take 5 mg by mouth nightly      docusate sodium (COLACE) 100 MG capsule Take 100 mg by mouth 2 times daily      Glucagon, rDNA, (GLUCAGON EMERGENCY IJ) Inject as directed      glucose (GLUTOSE) 40 % GEL Take 15 g by mouth See Admin Instructions      sevelamer (RENVELA) 800 MG tablet Take by mouth 1600 mg three times daily with meals and 800 mg at bedtime      hydrALAZINE (APRESOLINE) 10 MG tablet Take 1 tablet by mouth 2 times daily  Qty: 60 tablet, Refills: 1      metoprolol tartrate (LOPRESSOR) 25 MG tablet Take 1 tablet by mouth daily  Qty: 60 tablet, Refills: 0      pantoprazole (PROTONIX) 40 MG tablet Take 1 tablet by mouth every morning (before breakfast)  Qty: 30 tablet, Refills: 3      levothyroxine (SYNTHROID) 75 MCG tablet TAKE 1 TABLET BY MOUTH IN THE MORNING BEFORE BREAKFAST  Qty: 60 tablet, Refills: 0      insulin glargine (LANTUS) 100 UNIT/ML injection vial Inject 3 Units into the skin 2 times daily  Qty: 1 vial, Refills: 3      epoetin nena-epbx (RETACRIT) 3000 UNIT/ML SOLN injection Inject 1 mL into the skin three times a week  Qty: 21.9 mL      rOPINIRole (REQUIP) 0.25 MG tablet Take 0.25 mg by mouth nightly      blood glucose test strips (CONTOUR NEXT TEST) strip 1 each by In Vitro route 5 times daily As needed. Qty: 150 each, Refills: 5    Associated Diagnoses: Type 1 diabetes mellitus with other specified complication ([facility] Insulin pump in place      metoclopramide (REGLAN) 5 MG tablet Take 5 mg by mouth 3 times daily        ! ! - Potential duplicate medications found. Please discuss with provider. STOP taking these medications       darbepoetin nena-polysorbate (ARANESP, ALBUMIN FREE,) 60 MCG/0.3ML SOSY injection Comments:   Reason for Stopping:         Calcium Acetate, Phos Binder, 667 MG CAPS Comments:   Reason for Stopping:             Case discussed with Dr. Gladys Da Silva of ID as well as Dr. Jose R Ansari of podiatry prior to discharge. Note that more than 30 minutes was spent in preparing discharge papers, discussing discharge with patient, medication review, etc.    NOTE: This report was transcribed using voice recognition software. Every effort was made to ensure accuracy; however, inadvertent computerized transcription errors may be present.      Signed:  Electronically signed by Katelyn López MD on 5/20/2021 at 9:49 AM

## 2021-05-20 NOTE — PLAN OF CARE
Problem: Falls - Risk of:  Goal: Will remain free from falls  Description: Will remain free from falls  Outcome: Met This Shift  Goal: Absence of physical injury  Description: Absence of physical injury  Outcome: Met This Shift     Problem: Pain:  Goal: Pain level will decrease  Description: Pain level will decrease  Outcome: Met This Shift  Goal: Control of acute pain  Description: Control of acute pain  Outcome: Met This Shift     Problem: Skin Integrity:  Goal: Absence of new skin breakdown  Description: Absence of new skin breakdown  Outcome: Met This Shift

## 2021-05-20 NOTE — PROGRESS NOTES
Department of Podiatry  Resident Progress Note    Subjective  Patient seen bedside for S/p excisional wound debridement, bone biopsy and application of graft left leg on 21. No acute events overnight. Patient denies any N/V/D/F/C/SOB/CP and has no other complaints at this time.      Objective: Post op dressings CDI    Past Medical History:   Diagnosis Date    Acute congestive heart failure ([address]     BC (acute kidney injury) ([address] 10/1/2019    Cardiac arrest ([address] 2/15/2021    Cephalgia 10/9/2019    Chronic kidney disease     Depression     Diabetes mellitus ([address]     Diabetic gastroparesis associated with type 1 diabetes mellitus ([address] 2018    Diabetic ketoacidosis ([address] 2011    Diabetic ketoacidosis with coma associated with type 1 diabetes mellitus ([address] 2013    Diabetic polyneuropathy associated with type 1 diabetes mellitus ([address] 2020    Drug use complicating pregnancy in third trimester     Endocarditis 10/31/2020    ESRD (end stage renal disease) ([address] 2020    Hemodialysis patient ([address]     History of blood transfusion 2019    Hyperlipidemia 10/8/2020    Hyperosmolar hyperglycemic state (HHS) ([address] 2020    Hypothyroidism 10/8/2020    Iron deficiency anemia 10/1/2019    MDRO (multiple drug resistant organisms) resistance     MRSA (methicillin resistant Staphylococcus aureus)     back wound abcess    Non compliance w medication regimen 3/30/2016    Other disorders of kidney and ureter     Pregnancy 3/30/2016    16 weeks    Previous  delivery affecting pregnancy, antepartum 3/2/2017    Previous stillbirth or demise, antepartum 2016    Seizure ([address] 2020    Severe pre-eclampsia in third trimester 2016    Shock liver 2/15/2021    Valvular endocarditis 11/10/2020    This Diagnosis was added to the Problem List based on transcribed orders from Dr. Jean-Paul Hooks          Past Surgical History:   Procedure Laterality Date    BACK SURGERY      abscess     SECTION      x2    CHOLECYSTECTOMY, LAPAROSCOPIC N/A 2019    CHOLECYSTECTOMY LAPAROSCOPIC performed by Roberto Ocasio MD at [facility] N/A 2018    COLONOSCOPY WITH BIOPSY performed by Daljit Armijo MD at [address] [place] [address] COLONOSCOPY N/A 2018    COLONOSCOPY WITH BIOPSY performed by Lester Nguyen MD at [address]  2012    EF 57%    ECHO COMPL W DOP COLOR FLOW  6/10/2013         EMBOLECTOMY N/A 2020    [address] [place] [address], [id number] [place] [address], YULISSA -- REQS ROOM 3 performed by Kathie Rincon MD at [address] [place] [address] Left 2021    LEFT LEG INCISION AND DRAINAGE, DEBRIDEMENT, WOUND VAC APPLICATION performed by Ulices Wadsworth DPM at [address] [place] [address] 2021    LEFT LEG DEBRIDEMENT BIOPSY POSS APPLICATION WOUND VAC performed by Ulices Wadsworth DPM at [address] [place] [address] N/A 2020    LAPAROSCOPIC INSERTION PERITONEAL DIALYSIS CATHETER performed by Kinga Rojo MD at [place] [address] ESOPHAGOGASTRODUODENOSCOPY TRANSORAL DIAGNOSTIC N/A 2018    EGD ESOPHAGOGASTRODUODENOSCOPY performed by Daljit Armijo MD at [address] [place] [address] TRANSESOPHAGEAL ECHOCARDIOGRAM N/A 10/19/2020    TRANSESOPHAGEAL ECHOCARDIOGRAM WITH BUBBLE STUDY performed by Rigoberto Servni MD at [address] [place] [address] TUNNELED VENOUS PORT PLACEMENT  2018    UPPER GASTROINTESTINAL ENDOSCOPY  2018    EGD BIOPSY performed by Lester Nguyen MD at [address] [place] [address] N/A 10/11/2019    EGD ESOPHAGOGASTRODUODENOSCOPY performed by Jessica Delaney DO at [facility] History   Problem Relation Age of Onset    Asthma Mother     Hypertension Mother     High Blood Pressure Mother     Diabetes Mother     Asthma Brother     High Blood Pressure Father        Allergies   Allergen Reactions    Cefepime Other (See Comments)     \" neurological side effect- slurred speech and confusion    Toradol [Ketorolac Tromethamine] Anaphylaxis and Hives       Objective: Post op dressings CDI, no strike through noted. Wound vac intact with good seal and minimal dressings noted in the cannister. No POP with posterior left leg squeeze. Gross sensation intact to toes. Once vac removed wound was visualized. Wound base is granular with no clinical signs of infection.     CBC with Differential:    Lab Results   Component Value Date    WBC 6.9 05/20/2021    RBC 2.99 05/20/2021    HGB 8.8 05/20/2021    HCT 27.5 05/20/2021     05/20/2021    MCV 92.0 05/20/2021    MCH 29.4 05/20/2021    MCHC 32.0 05/20/2021    RDW 13.4 05/20/2021    NRBC 0.9 08/10/2020    SEGSPCT 67 06/27/2013    METASPCT 1.7 08/10/2020    LYMPHOPCT 38.7 05/15/2021    MONOPCT 5.2 05/15/2021    BASOPCT 1.4 05/15/2021    MONOSABS 0.33 05/15/2021    LYMPHSABS 2.46 05/15/2021    EOSABS 0.25 05/15/2021    BASOSABS 0.09 05/15/2021     Hemoglobin/Hematocrit:    Lab Results   Component Value Date    HGB 8.8 05/20/2021    HCT 27.5 05/20/2021       Scheduled Meds:   sodium chloride flush  5-40 mL Intravenous 2 times per day    epoetin nena-epbx  3,000 Units Subcutaneous Once per day on Mon Wed Fri    vancomycin  750 mg Intravenous Once    folic acid  1 mg Oral Daily    sulfamethoxazole-trimethoprim  1 tablet Oral Daily    vancomycin (VANCOCIN) intermittent dosing (placeholder)   Other RX Placeholder    potassium chloride  20 mEq Oral Daily with breakfast    ARIPiprazole  5 mg Oral Nightly    docusate sodium  100 mg Oral BID    hydrALAZINE  10 mg Oral BID    hydrOXYzine  25 mg Oral Daily    insulin glargine  3 Units Subcutaneous BID    insulin lispro  0-3 Units Subcutaneous Nightly    insulin lispro  0-6 Units Subcutaneous TID WC    levothyroxine  75 mcg Oral Daily    metoprolol tartrate  25 mg Oral Daily    mirtazapine  15 mg Oral Nightly    pantoprazole  40 mg Oral QAM AC    rOPINIRole  0.25 mg Oral Nightly

## 2021-05-20 NOTE — PROGRESS NOTES
[facility]  [facility]  Progress Note    NAME: Tavo Wolf  MR:  [id number]  :   1990  DATE OF SERVICE:21    This is a face to face encounter with Wilton Flores 36 y.o. female on 21    CHIEF COMPLAINT     ID following for   Chief Complaint   Patient presents with    Altered Mental Status     fatigue, progressive weakness today, EMS called by boyfriend, patient responsive to strong verbal stimulus at present     HISTORY OF PRESENT ILLNESS   inbed   no c/o f/c/nv/d  Left le dressed was seen by podiatry this am   Patient is tolerating medications. No reported adverse drug reactions. REVIEW OF SYSTEMS     As stated above in the chief complaint, otherwise negative.   CURRENT MEDICATIONS      sodium chloride flush  5-40 mL Intravenous 2 times per day    epoetin nena-epbx  3,000 Units Subcutaneous Once per day on     vancomycin  750 mg Intravenous Once    folic acid  1 mg Oral Daily    sulfamethoxazole-trimethoprim  1 tablet Oral Daily    vancomycin (VANCOCIN) intermittent dosing (placeholder)   Other RX Placeholder    potassium chloride  20 mEq Oral Daily with breakfast    ARIPiprazole  5 mg Oral Nightly    docusate sodium  100 mg Oral BID    hydrALAZINE  10 mg Oral BID    hydrOXYzine  25 mg Oral Daily    insulin glargine  3 Units Subcutaneous BID    insulin lispro  0-3 Units Subcutaneous Nightly    insulin lispro  0-6 Units Subcutaneous TID WC    levothyroxine  75 mcg Oral Daily    metoprolol tartrate  25 mg Oral Daily    mirtazapine  15 mg Oral Nightly    pantoprazole  40 mg Oral QAM AC    rOPINIRole  0.25 mg Oral Nightly    sevelamer  1,600 mg Oral TID WC    heparin (porcine)  5,000 Units Subcutaneous 3 times per day     Continuous Infusions:   sodium chloride      dextrose       PRN Meds:sodium chloride flush, sodium chloride, heparin flush, oxyCODONE **OR** oxyCODONE, fentanNYL, dicyclomine, prochlorperazine, loperamide, albuterol, glucose, dextrose, glucagon (rDNA), dextrose, polyethylene glycol, promethazine **OR** ondansetron, acetaminophen **OR** acetaminophen, bisacodyl, perflutren lipid microspheres    PHYSICAL EXAM     BP (!) 172/98   Pulse 105   Temp 97.3 °F (36.3 °C) (Oral)   Resp 16   Ht 5' 4\" (1.626 m)   Wt 140 lb 14.4 oz (63.9 kg)   LMP  (LMP Unknown)   SpO2 100%   BMI 24.19 kg/m²   Temp  Av.1 °F (36.7 °C)  Min: 97.3 °F (36.3 °C)  Max: 98.7 °F (37.1 °C)  Constitutional:  The patient is awake, alert,    Skin:    Warm and dry. HEENT:      AT/NC glasses  Neck:     ij  Chest:   No use of accessory muscles to breathe. Symmetrical expansion. cta ant  Cardiovascular:  S1 and S2 are rhythmic and regular. Abdomen:    Benign to palpation. Pd cath bs  Extremities:    .left dressed aced  CNS    AAxO   Lines: rij    DIAGNOSTIC RESULTS   Radiology:    Recent Labs     21  0450 21  0640 21  0729   WBC 7.4 7.2 6.9   RBC 2.85* 2.76* 2.99*   HGB 8.4* 8.0* 8.8*   HCT 25.9* 25.0* 27.5*   MCV 90.9 90.6 92.0   MCH 29.5 29.0 29.4   MCHC 32.4 32.0 32.0   RDW 13.1 13.1 13.4    177 186   MPV 9.5 9.5 10.1     Recent Labs     21  0450 21  0640 21  0729    141 139   K 3.0* 3.6 4.0   CL 98 102 99   CO2 28 28 27   BUN 30* 32* 33*   CREATININE 8.4* 7.6* 7.5*   GLUCOSE 206* 251* 216*   LABALBU 3.0* 3.8 3.9   CALCIUM 8.6 8.8 9.1     Lab Results   Component Value Date    CRP 12.8 (H) 2021    CRP 2.5 (H) 10/31/2020    CRP 43.1 (H) 2019     Lab Results   Component Value Date    SEDRATE 95 (H) 2021    SEDRATE 35 (H) 10/31/2020    SEDRATE 19 10/13/2020     No results for input(s): CRP, PROCAL, FERRITIN, LDH, TROPONINI, DDIMER, FIBRINOGEN, INR, PROTIME, AST, ALT, TRIG in the last 72 hours.   Lab Results   Component Value Date    CHOL 95 2020    TRIG 82 2020    HDL 33 2020    LDLCALC 46 2020    LABVLDL 16 2020     Lab Results   Component

## 2021-05-21 ENCOUNTER — CARE COORDINATION (OUTPATIENT)
Dept: CASE MANAGEMENT | Age: 31
End: 2021-05-21

## 2021-05-21 NOTE — CARE COORDINATION
[facility] Transitions Initial Follow Up Call    Call within 2 business days of discharge: Yes    Patient: Dar Wade Patient : 1990   MRN: [id number]  Reason for Admission: Hypokalemia  Discharge Date: 21 RARS: Readmission Risk Score: 65      Last Discharge [facility]       Complaint Diagnosis Description Type Department Provider    21 Altered Mental Status Hypokalemia . .. ED to Hosp-Admission (Discharged) (ADMITTED) Patricia Velarde MD; Saman Alcaraz. .. Attempted to contact patient today 21 for TCM/hospital discharge follow up. Left message on home/mobile number requesting a return call back to CTN and provided contact information. CTN spoke with Milagro at [facility] confirming nurse visit is scheduled for today for [facility]. CTN will continue with outreach.      HEBER Og

## 2021-05-24 ENCOUNTER — HOSPITAL ENCOUNTER (OUTPATIENT)
Dept: WOUND CARE | Age: 31
Discharge: HOME | End: 2021-05-24
Payer: COMMERCIAL

## 2021-05-24 ENCOUNTER — CARE COORDINATION (OUTPATIENT)
Dept: CASE MANAGEMENT | Age: 31
End: 2021-05-24

## 2021-05-24 VITALS
HEART RATE: 68 BPM | RESPIRATION RATE: 18 BRPM | TEMPERATURE: 97.7 F | SYSTOLIC BLOOD PRESSURE: 142 MMHG | DIASTOLIC BLOOD PRESSURE: 84 MMHG

## 2021-05-24 PROCEDURE — 99213 OFFICE O/P EST LOW 20 MIN: CPT

## 2021-05-24 PROCEDURE — 11042 DBRDMT SUBQ TIS 1ST 20SQCM/<: CPT

## 2021-05-24 RX ORDER — LIDOCAINE HYDROCHLORIDE 20 MG/ML
JELLY TOPICAL ONCE
Status: CANCELLED | OUTPATIENT
Start: 2021-05-24 | End: 2021-05-24

## 2021-05-24 RX ORDER — LIDOCAINE HYDROCHLORIDE 40 MG/ML
SOLUTION TOPICAL ONCE
Status: CANCELLED | OUTPATIENT
Start: 2021-05-24 | End: 2021-05-24

## 2021-05-24 RX ORDER — BACITRACIN, NEOMYCIN, POLYMYXIN B 400; 3.5; 5 [USP'U]/G; MG/G; [USP'U]/G
OINTMENT TOPICAL ONCE
Status: CANCELLED | OUTPATIENT
Start: 2021-05-24 | End: 2021-05-24

## 2021-05-24 RX ORDER — BACITRACIN ZINC AND POLYMYXIN B SULFATE 500; 1000 [USP'U]/G; [USP'U]/G
OINTMENT TOPICAL ONCE
Status: CANCELLED | OUTPATIENT
Start: 2021-05-24 | End: 2021-05-24

## 2021-05-24 RX ORDER — CLOBETASOL PROPIONATE 0.5 MG/G
OINTMENT TOPICAL ONCE
Status: CANCELLED | OUTPATIENT
Start: 2021-05-24 | End: 2021-05-24

## 2021-05-24 RX ORDER — LIDOCAINE 40 MG/G
CREAM TOPICAL ONCE
Status: CANCELLED | OUTPATIENT
Start: 2021-05-24 | End: 2021-05-24

## 2021-05-24 RX ORDER — LIDOCAINE HYDROCHLORIDE 40 MG/ML
SOLUTION TOPICAL ONCE
Status: DISCONTINUED | OUTPATIENT
Start: 2021-05-24 | End: 2021-05-25 | Stop reason: HOSPADM

## 2021-05-24 RX ORDER — GENTAMICIN SULFATE 1 MG/G
OINTMENT TOPICAL ONCE
Status: CANCELLED | OUTPATIENT
Start: 2021-05-24 | End: 2021-05-24

## 2021-05-24 RX ORDER — LIDOCAINE 50 MG/G
OINTMENT TOPICAL ONCE
Status: CANCELLED | OUTPATIENT
Start: 2021-05-24 | End: 2021-05-24

## 2021-05-24 RX ORDER — GINSENG 100 MG
CAPSULE ORAL ONCE
Status: CANCELLED | OUTPATIENT
Start: 2021-05-24 | End: 2021-05-24

## 2021-05-24 RX ORDER — BETAMETHASONE DIPROPIONATE 0.05 %
OINTMENT (GRAM) TOPICAL ONCE
Status: CANCELLED | OUTPATIENT
Start: 2021-05-24 | End: 2021-05-24

## 2021-05-24 ASSESSMENT — PAIN DESCRIPTION - LOCATION: LOCATION: LEG

## 2021-05-24 ASSESSMENT — PAIN DESCRIPTION - ORIENTATION: ORIENTATION: LEFT

## 2021-05-24 ASSESSMENT — PAIN DESCRIPTION - PROGRESSION: CLINICAL_PROGRESSION: NOT CHANGED

## 2021-05-24 ASSESSMENT — PAIN DESCRIPTION - DESCRIPTORS: DESCRIPTORS: THROBBING

## 2021-05-24 ASSESSMENT — PAIN DESCRIPTION - ONSET: ONSET: ON-GOING

## 2021-05-24 ASSESSMENT — PAIN - FUNCTIONAL ASSESSMENT: PAIN_FUNCTIONAL_ASSESSMENT: PREVENTS OR INTERFERES SOME ACTIVE ACTIVITIES AND ADLS

## 2021-05-24 ASSESSMENT — PAIN DESCRIPTION - PAIN TYPE: TYPE: ACUTE PAIN

## 2021-05-24 NOTE — CARE COORDINATION
Care Transitions Outreach Attempt    Call within 2 business days of discharge: Yes   Attempted to reach patient for transitions of care follow up. Unable to reach patient. Patient: Skinny Acevedo Patient : 1990 MRN: [id number]    Last Discharge [facility]       Complaint Diagnosis Description Type Department Provider    21 Altered Mental Status Hypokalemia . .. ED to Hosp-Admission (Discharged) (ADMITTED) Suzan Quezada MD; Yuli Wickhaven. .. Was this an external facility discharge? No Discharge Facility: N/A    Noted following upcoming appointments from discharge chart review:   [facility] follow up appointment(s):   Future Appointments   Date Time Provider Emiliana De Los Santos   2021 10:30 AM Brie Arguello MD [facility] [place]   6/3/2021  1:15 [facility] [address] [facility]   2021 10:15 AM Ramy Sammuel Mcardle, DPM [address] [facility]     Second attempt made today 21 to contact patient for TCM/hospital discharge follow up. Spoke with Mother Valorie Bean who advised she is not around patient at this time and provided alternate number to all [phone]. CTN called alternate number leaving message requesting a return call back to CTN and provided contact information. CTN signing off if no return call back.

## 2021-05-24 NOTE — PROGRESS NOTES
Wound Healing Center  History and Physical/Consultation  Podiatry    Referring Physician : Ileana Lora MD  [facility] RECORD NUMBER:  [id number]  AGE: 36 y.o. GENDER: female  : 1990  EPISODE DATE:  2021  Subjective:     Chief Complaint   Patient presents with    Wound Check     LLE         HISTORY of PRESENT ILLNESS HPI     Destiny Coto is a 36 y.o. female who presents today for wound/ulcer evaluation. History of Wound Context:  The patient has had a wound of Anterior LLE which was first noted approximately >2 months. This has been treated OR debridement. On their initial visit to the wound healing center, 21 ,  the patient has noted that the wound has been improving. The patient has had similar previous wounds in the past.      Pt is on abx at time of initial visit.       Wound/Ulcer Pain Timing/Severity: intermittent  Quality of pain: sharp  Severity:  6 / 10   Modifying Factors: None  Associated Signs/Symptoms: none    Ulcer Identification:  Ulcer Type: diabetic  Contributing Factors: diabetes    Diabetic/Pressure/Non Pressure Ulcers onl y:  Ulcer: Diabetic ulcer, fat layer exposed    If patient has diabetic lower extremity wounds  Gan Classification of diabetic lower extremity wounds:    Grade Description   []  0 No open wound   []  1 Superficial ulcer involving the full skin thickness   [x]  2 Deep ulcer involves ligament, tendon, joint capsule, or fascia  No bone involvement or abscess presence   []  3 Deep Ulcer with abcess formation and/or osteomyelitis   []  4 Localized gangrene   []  5 Extensive gangrene of the foot     Wound: N/A        PAST MEDICAL HISTORY      Diagnosis Date    Acute congestive heart failure ([address]     BC (acute kidney injury) ([address] 10/1/2019    Cardiac arrest ([address] 2/15/2021    Cephalgia 10/9/2019    Chronic kidney disease     Depression     Diabetes mellitus ([address]     Diabetic gastroparesis associated with type 1 diabetes mellitus [facility] 2018    Diabetic ketoacidosis ([address] 2011    Diabetic ketoacidosis with coma associated with type 1 diabetes mellitus ([address] 2013    Diabetic polyneuropathy associated with type 1 diabetes mellitus ([address] 2020    Drug use complicating pregnancy in third trimester     Endocarditis 10/31/2020    ESRD (end stage renal disease) ([facility] 2020    Hemodialysis patient ([facility]     History of blood transfusion 2019    Hyperlipidemia 10/8/2020    Hyperosmolar hyperglycemic state (HHS) ([facility] 2020    Hypothyroidism 10/8/2020    Iron deficiency anemia 10/1/2019    MDRO (multiple drug resistant organisms) resistance     MRSA (methicillin resistant Staphylococcus aureus)     back wound abcess    Non compliance w medication regimen 3/30/2016    Other disorders of kidney and ureter     Pregnancy 3/30/2016    16 weeks    Previous  delivery affecting pregnancy, antepartum 3/2/2017    Previous stillbirth or demise, antepartum 2016    Seizure ([facility] 2020    Severe pre-eclampsia in third trimester 2016    Shock liver 2/15/2021    Valvular endocarditis 11/10/2020    This Diagnosis was added to the Problem List based on transcribed orders from Dr. Jamir Sanchez        Past Surgical History:   Procedure Laterality Date    BACK SURGERY      abscess   [address]      x2    CHOLECYSTECTOMY, LAPAROSCOPIC N/A 2019    CHOLECYSTECTOMY LAPAROSCOPIC performed by Tiffany Chauhan MD at [facility] 2018    COLONOSCOPY WITH BIOPSY performed by Kay Goltz, MD at [address] [facility] N/A 2018    COLONOSCOPY WITH BIOPSY performed by Clarita Goodell, MD at [address] [place] [address] ECHO COMPL W [address]  2012    EF 57%    ECHO COMPL W DOP COLOR FLOW  6/10/2013         EMBOLECTOMY N/A 2020    Félix Friday, YULISSA -- REQS ROOM 3 performed by Lidya Wallace MD at [address] [place] [address] Left 2021 LEFT LEG INCISION AND DRAINAGE, DEBRIDEMENT, WOUND VAC APPLICATION performed by Radha Larson DPM at [address] [facility] Left 2021    LEFT LEG DEBRIDEMENT BIOPSY POSS APPLICATION WOUND VAC performed by Radha Larson DPM at [place] [address] N/A 2020    LAPAROSCOPIC INSERTION PERITONEAL DIALYSIS CATHETER performed by Honorio Calixto MD at [address] [place] [address] ESOPHAGOGASTRODUODENOSCOPY TRANSORAL DIAGNOSTIC N/A 2018    EGD ESOPHAGOGASTRODUODENOSCOPY performed by Aixa Lay MD at [address] [place] [address] TRANSESOPHAGEAL ECHOCARDIOGRAM N/A 10/19/2020    TRANSESOPHAGEAL ECHOCARDIOGRAM WITH BUBBLE STUDY performed by Arnold Vega MD at [address] [place] [address] TUNNELED VENOUS PORT PLACEMENT  2018    UPPER GASTROINTESTINAL ENDOSCOPY  2018    EGD BIOPSY performed by Larisa Purcell MD at [address] [facility] N/A 10/11/2019    EGD ESOPHAGOGASTRODUODENOSCOPY performed by Dalia Evangelista DO at [address] History   Problem Relation Age of Onset    Asthma Mother     Hypertension Mother     High Blood Pressure Mother     Diabetes Mother     Asthma Brother     High Blood Pressure Father      Social History     Tobacco Use    Smoking status: Former Smoker     Packs/day: 0.50     Years: 6.00     Pack years: 3.00     Types: Cigarettes     Quit date: 2021     Years since quittin.3    Smokeless tobacco: Never Used    Tobacco comment: vaper cig   Vaping Use    Vaping Use: Never used   Substance Use Topics    Alcohol use: No    Drug use: No     Comment: documented prior history of opioid abuse     Allergies   Allergen Reactions    Cefepime Other (See Comments)     \" neurological side effect- slurred speech and confusion    Toradol [Ketorolac Tromethamine] Anaphylaxis and Hives     Current Outpatient Medications on File Prior to Encounter   Medication Sig Dispense Refill    folic acid (FOLVITE) 1 MG tablet Take 1 tablet by mouth daily 30 tablet 3    sulfamethoxazole-trimethoprim (BACTRIM;SEPTRA) 400-80 MG per tablet Take 1 tablet by mouth daily for 14 days 14 tablet 0    mirtazapine (REMERON) 15 MG tablet Take 15 mg by mouth nightly      hydrOXYzine (ATARAX) 25 MG tablet Take 25 mg by mouth daily      ARIPiprazole (ABILIFY) 5 MG tablet Take 5 mg by mouth nightly      docusate sodium (COLACE) 100 MG capsule Take 100 mg by mouth 2 times daily      sevelamer (RENVELA) 800 MG tablet Take by mouth 1600 mg three times daily with meals and 800 mg at bedtime      hydrALAZINE (APRESOLINE) 10 MG tablet Take 1 tablet by mouth 2 times daily 60 tablet 1    metoprolol tartrate (LOPRESSOR) 25 MG tablet Take 1 tablet by mouth daily 60 tablet 0    pantoprazole (PROTONIX) 40 MG tablet Take 1 tablet by mouth every morning (before breakfast) 30 tablet 3    levothyroxine (SYNTHROID) 75 MCG tablet TAKE 1 TABLET BY MOUTH IN THE MORNING BEFORE BREAKFAST 60 tablet 0    insulin glargine (LANTUS) 100 UNIT/ML injection vial Inject 3 Units into the skin 2 times daily 1 vial 3    insulin lispro (HUMALOG) 100 UNIT/ML injection vial Inject 0-3 Units into the skin nightly **Corrective Bedtime (50%) Low Dose Algorithm** Glucose: Dose:  - No Insulin, 140-249 1 Unit, 250-349 2 Units, Over 350 3 Units 1 vial 3    epoetin nena-epbx (RETACRIT) 3000 UNIT/ML SOLN injection Inject 1 mL into the skin three times a week 21.9 mL     rOPINIRole (REQUIP) 0.25 MG tablet Take 0.25 mg by mouth nightly      blood glucose test strips (CONTOUR NEXT TEST) strip 1 each by In Vitro route 5 times daily As needed.  150 each 5    metoclopramide (REGLAN) 5 MG tablet Take 5 mg by mouth 3 times daily       polyethylene glycol (GLYCOLAX) 17 g packet Take 17 g by mouth daily as needed for Constipation      albuterol (PROVENTIL) (2.5 MG/3ML) 0.083% nebulizer solution Take 2.5 mg by nebulization every 6 hours as needed for Wheezing      Glucagon, rDNA, (GLUCAGON EMERGENCY IJ) Inject as directed      glucose (GLUTOSE) 40 % GEL Take 15 g by mouth See Admin Instructions      insulin lispro (HUMALOG) 100 UNIT/ML injection vial Inject 0-6 Units into the skin 3 times daily (with meals) **Corrective Low Dose Algorithm** Glucose: Dose: -No Insulin, 140-199 - 1 Unit, 200-249 - 2 Units, 250-299 - 3 Units, 300-349 - 4 Units, 350-399 - 5 Units, Over 399 - 6 Units 1 vial 3     No current facility-administered medications on file prior to encounter.        REVIEW OF SYSTEMS   ROS : All others Negative if blank [], Positive if [x]  General Vascular   [] Fevers [] Claudication   [] Chills [] Rest Pain   Skin Neurologic   [x] Tissue Loss [x] Lower extremity neuropathy     Objective:    BP (!) 142/84   Pulse 68   Temp 97.7 °F (36.5 °C) (Temporal)   Resp 18   LMP  (LMP Unknown)   Wt Readings from Last 3 Encounters:   05/20/21 140 lb 14.4 oz (63.9 kg)   04/19/21 146 lb (66.2 kg)   02/26/21 164 lb 3.9 oz (74.5 kg)       PHYSICAL EXAM   CONSTITUTIONAL:   Awake, alert, cooperative   PSYCHIATRIC :  Oriented to time, place and person      normal insight to disease process  EXTREMITIES:   R LE Open wounds are not noted   Skin color is abnormal with hyperpigmentation   Edema is not noted   Sensation intact to light touch   Palpation of the foot does not cause pain   5/5 strength DF/PF  L LE Open wounds are noted   Skin color is abnormal with hyperpigmentation   Edema is  noted   Sensation deficit noted - to foot and leg   Palpation of the foot does not cause pain   5/5 strength DF/PF  R dorsalis pedis +2 L dorsalis pedis +2   R posterior tibial +2 L posterior tibial +2     Assessment:     Problem List Items Addressed This Visit     None          Pre Debridement Measurements:  Are located in the [facility]  Documentation Flow Sheet  Post Debridement Measurements:  Wound/Ulcer Descriptions are Pre Debridement except measurements:     Negative Pressure Wound Therapy Leg Anterior;Left;Lower (Active)   Target Pressure (mmHg) 125 05/20/21 0900   Drainage Amount Scant 05/20/21 0900   Wound Assessment Other (Comment) 05/20/21 0900   Wanda-wound Assessment Other (Comment) 05/20/21 0900   Number of days: 6       Wound 02/26/21 Left; Anterior (Active)   Number of days: 86       Wound 05/14/21 Tibial Left; Anterior open would with staples present (Active)   Dressing Status Clean;Dry 05/20/21 0900   Wound Cleansed Cleansed with saline 05/19/21 2130   Dressing/Treatment Roll gauze; Ace wrap 05/20/21 0900   Wound Length (cm) 8.9 cm 05/15/21 0026   Wound Width (cm) 2.5 cm 05/15/21 0026   Wound Depth (cm) 0.3 cm 05/15/21 0026   Wound Surface Area (cm^2) 22.25 cm^2 05/15/21 0026   Wound Volume (cm^3) 6.68 cm^3 05/15/21 0026   Wound Assessment Pink/red;Slough 05/19/21 2130   Drainage Amount Small 05/19/21 2130   Drainage Description Yellow 05/19/21 2130   Odor Mild 05/19/21 2130   Wanda-wound Assessment Blanchable erythema 05/19/21 2130   Margins Undefined edges; Attached edges 05/19/21 2130   Number of days: 9       Wound 05/24/21 Pretibial Left #1 (Active)   Wound Image   05/24/21 0837   Wound Etiology Surgical 05/24/21 0837   Wound Length (cm) 7.2 cm 05/24/21 0837   Wound Width (cm) 1.9 cm 05/24/21 0837   Wound Depth (cm) 0.3 cm 05/24/21 0837   Wound Surface Area (cm^2) 13.68 cm^2 05/24/21 0837   Wound Volume (cm^3) 4.1 cm^3 05/24/21 0837   Wound Assessment Fibrin;Slough;Pink/red;Pale granulation tissue 05/24/21 0837   Drainage Amount Small 05/24/21 0837   Drainage Description Yellow; Thick; Serosanguinous 05/24/21 0837   Odor None 05/24/21 0837   Wanda-wound Assessment Hyperpigmented; Intact 05/24/21 0837   Number of days: 0     Incision 02/23/21 Pretibial Left (Active)   Number of days: 90       Incision 05/18/21 Pretibial Left (Active)   Dressing Status Clean;Dry; Intact 05/20/21 0900   Dressing/Treatment Ace wrap 05/20/21 0900   Closure Other (Comment) 05/20/21 0900   Margins Other (Comment) 05/20/21 0900   Incision Assessment Other (Comment) 05/20/21 0900   Drainage Amount None 05/20/21 0900   Wanda-incision Assessment Other (Comment) 05/20/21 0900   Number of days: 6       Procedure Note  Indications:  Based on my examination of this patient's wound(s)/ulcer(s) today, debridement is required to promote healing and evaluate the wound base. Performed by: Mabel Miranda DPM    Consent obtained:  Yes    Time out taken:  Yes    Pain Control: Anesthetic  Anesthetic: 4% Lidocaine Liquid Topical     Debridement:Excisional Debridement    Using curette the wound(s)/ulcer(s) was/were sharply debrided down through and including the removal of subcutaneous tissue. Devitalized Tissue Debrided:  slough to stimulate bleeding to promote healing, post debridement good bleeding base and wound edges noted    Wound/Ulcer #: 1    Percent of Wound/Ulcer Debrided: 100%    Total Surface Area Debrided:  13.68 sq cm     Estimated Blood Loss:  None  Hemostasis Achieved:  by pressure    Procedural Pain:  6  / 10   Post Procedural Pain:  6 / 10     Response to treatment:  Well tolerated by patient. A culture was done. Plan:     Pt is not a smoker   - Discussed relationship of smoking and negative affects on wound healing   - Emphasized importance of tobacco avoidace/cessation   - Script for nicotine patch given to patient   - Information regarding support groups for smoking cessation given    In my professional opinion and based off the information that is available at this time this patient has appropriate indication for HBO Therapy: Unknown    Treatment Note please see attached Discharge Instructions    Written patient dismissal instructions given to patient and signed by patient or POA.          Discharge Instructions       Visit Discharge/Physician Orders    Discharge condition: Stable    Assessment of pain at discharge: moderate    Anesthetic used: 4% topical lidocaine    Discharge to: Home    Left via:Private automobile    Accompanied by: kevin    ECF/HHA: [facility] home care    Dressing Orders:  Cleanse left lower leg wound with normal saline solution and apply plain    Treatment Orders:    [facility] followup visit _____________2 weeks________________  (Please note your next appointment above and if you are unable to keep, kindly give a 24 hour notice. Thank you.)    Physician signature:__________________________      If you experience any of the following, please call the [address] [place] [address] Road during business hours:    * Increase in Pain  * Temperature over 101  * Increase in drainage from your wound  * Drainage with a foul odor  * Bleeding  * Increase in swelling  * Need for compression bandage changes due to slippage, breakthrough drainage. If you need medical attention outside of the business hours of the [address] [place] [address] Road please contact your PCP or go to the nearest emergency room.         Electronically signed by Caitlyn Griffin DPM on 5/24/2021 at 9:05 AM

## 2021-05-28 NOTE — PROGRESS NOTES
Discharge instructions reviewed with pt and AVS given per Dr. Dennys Parker.  notified of updated MAR. Copy made of discharge meds. Will scan to pt's chart. Dr requesting updated AVS and lab scripts be mailed to patient. Same placed in mail.  states she will call patient to notify of same.
Message left to call regarding overdue labs.
Message left with patient mother to have her call. She has overdue labs and a missed appointment.
[facility]    Attending Physician Statement  I have discussed the case, including pertinent history and exam findings with the resident physician. I agree with the assessment, plan and orders as documented by the resident. I have reviewed all pertinent PMHx, PSHx, FamHx, SocialHx, medications, and allergies and updated history as appropriate. Patient presents for hospital follow up appointment. Was in ICU for PEA with cardiac arrest likely secondary to hyperkalemia due to DKA and necrotizing infection on her L gastrocnemius. Today, patient is doing better. Type 1 DM - Now on 3 Units Lantus BID and sliding scale humalog prior to meals. Glucose from 113 - 230. Sees Dr. Rendon Room. L leg infection - Has completed course of antibiotics. Mother keeps the wound clean. Sees wound care on Wednesday. History of opioid abuse but patient denies current use. Bipolar disorder - Has counseling appointment later this week. No SI/HI. Medications managed by psychiatry. Seizures -stable. Continue to monitor for recurrent    ESRD - followed by nephrology and is on peritoneal dialysis. HFpEF - compensated   Continue current treatment and follow with cardiology. Remainder of medical problems as per resident note.     Sara Moreno MD  4/19/2021 3:08 PM
Cardiovascular: Negative for chest pain, palpitations and leg swelling. Gastrointestinal: Negative for abdominal pain, blood in stool, diarrhea, nausea and vomiting. Genitourinary: Negative for dysuria and hematuria. Skin: Positive for wound (L calf wound s/p I&D). Neurological: Positive for seizures (during most recent hospitalization) and numbness (LLE toes - thinks it may be d/t neuropathy). Negative for dizziness and light-headedness. Psychiatric/Behavioral: Negative for self-injury and suicidal ideas. The patient is not nervous/anxious.       Outpatient Medications Marked as Taking for the 4/19/21 encounter (Office Visit) with Obey Latham, DO   Medication Sig Dispense Refill    mirtazapine (REMERON) 15 MG tablet Take 15 mg by mouth nightly      hydrOXYzine (ATARAX) 25 MG tablet Take 25 mg by mouth daily      polyethylene glycol (GLYCOLAX) 17 g packet Take 17 g by mouth daily as needed for Constipation      albuterol (PROVENTIL) (2.5 MG/3ML) 0.083% nebulizer solution Take 2.5 mg by nebulization every 6 hours as needed for Wheezing      darbepoetin nena-polysorbate (ARANESP, ALBUMIN FREE,) 60 MCG/0.3ML SOSY injection Inject 60 mcg into the skin Every 2 weeks at dialysis      ARIPiprazole (ABILIFY) 5 MG tablet Take 5 mg by mouth nightly      docusate sodium (COLACE) 100 MG capsule Take 100 mg by mouth 2 times daily      Glucagon, rDNA, (GLUCAGON EMERGENCY IJ) Inject as directed      glucose (GLUTOSE) 40 % GEL Take 15 g by mouth See Admin Instructions      sevelamer (RENVELA) 800 MG tablet Take by mouth 1600 mg three times daily with meals and 800 mg at bedtime      hydrALAZINE (APRESOLINE) 10 MG tablet Take 1 tablet by mouth 2 times daily 60 tablet 1    metoprolol tartrate (LOPRESSOR) 25 MG tablet Take 1 tablet by mouth daily 60 tablet 0    pantoprazole (PROTONIX) 40 MG tablet Take 1 tablet by mouth every morning (before breakfast) 30 tablet 3    levothyroxine (SYNTHROID) 75 MCG

## 2021-06-01 ENCOUNTER — OFFICE VISIT (OUTPATIENT)
Dept: ENDOCRINOLOGY | Age: 31
End: 2021-06-01
Payer: COMMERCIAL

## 2021-06-01 VITALS
BODY MASS INDEX: 24.19 KG/M2 | RESPIRATION RATE: 16 BRPM | DIASTOLIC BLOOD PRESSURE: 52 MMHG | HEIGHT: 64 IN | SYSTOLIC BLOOD PRESSURE: 89 MMHG | HEART RATE: 67 BPM | OXYGEN SATURATION: 97 %

## 2021-06-01 DIAGNOSIS — E10.69 TYPE 1 DIABETES MELLITUS WITH OTHER SPECIFIED COMPLICATION (HCC): Primary | ICD-10-CM

## 2021-06-01 DIAGNOSIS — Z96.41 INSULIN PUMP IN PLACE: ICD-10-CM

## 2021-06-01 DIAGNOSIS — N18.6 ESRD (END STAGE RENAL DISEASE) (HCC): ICD-10-CM

## 2021-06-01 LAB — HBA1C MFR BLD: 11.8 %

## 2021-06-01 PROCEDURE — 3046F HEMOGLOBIN A1C LEVEL >9.0%: CPT | Performed by: INTERNAL MEDICINE

## 2021-06-01 PROCEDURE — G8428 CUR MEDS NOT DOCUMENT: HCPCS | Performed by: INTERNAL MEDICINE

## 2021-06-01 PROCEDURE — 2022F DILAT RTA XM EVC RTNOPTHY: CPT | Performed by: INTERNAL MEDICINE

## 2021-06-01 PROCEDURE — G8420 CALC BMI NORM PARAMETERS: HCPCS | Performed by: INTERNAL MEDICINE

## 2021-06-01 PROCEDURE — 99214 OFFICE O/P EST MOD 30 MIN: CPT | Performed by: INTERNAL MEDICINE

## 2021-06-01 PROCEDURE — 1036F TOBACCO NON-USER: CPT | Performed by: INTERNAL MEDICINE

## 2021-06-01 PROCEDURE — 1111F DSCHRG MED/CURRENT MED MERGE: CPT | Performed by: INTERNAL MEDICINE

## 2021-06-01 PROCEDURE — 83036 HEMOGLOBIN GLYCOSYLATED A1C: CPT | Performed by: INTERNAL MEDICINE

## 2021-06-01 RX ORDER — GLUCOSAMINE HCL/CHONDROITIN SU 500-400 MG
CAPSULE ORAL
Qty: 250 STRIP | Refills: 5 | Status: SHIPPED
Start: 2021-06-01 | End: 2021-09-28 | Stop reason: ALTCHOICE

## 2021-06-01 RX ORDER — INSULIN LISPRO 100 [IU]/ML
INJECTION, SOLUTION INTRAVENOUS; SUBCUTANEOUS
Qty: 10 PEN | Refills: 3 | Status: ON HOLD
Start: 2021-06-01 | End: 2021-11-14

## 2021-06-01 RX ORDER — PEN NEEDLE, DIABETIC 32GX 5/32"
NEEDLE, DISPOSABLE MISCELLANEOUS
Qty: 250 EACH | Refills: 5 | Status: SHIPPED
Start: 2021-06-01 | End: 2021-09-28 | Stop reason: ALTCHOICE

## 2021-06-01 RX ORDER — INSULIN GLARGINE 100 [IU]/ML
5 INJECTION, SOLUTION SUBCUTANEOUS 2 TIMES DAILY
Qty: 10 PEN | Refills: 3 | Status: ON HOLD
Start: 2021-06-01 | End: 2021-07-02 | Stop reason: HOSPADM

## 2021-06-01 NOTE — PROGRESS NOTES
gastroparesis associated with type 1 diabetes mellitus ([address] 2018    Diabetic ketoacidosis ([address] 2011    Diabetic ketoacidosis with coma associated with type 1 diabetes mellitus ([address] 2013    Diabetic polyneuropathy associated with type 1 diabetes mellitus ([address] 2020    Drug use complicating pregnancy in third trimester     Endocarditis 10/31/2020    ESRD (end stage renal disease) ([address] 2020    Hemodialysis patient ([address]     History of blood transfusion 2019    Hyperlipidemia 10/8/2020    Hyperosmolar hyperglycemic state (HHS) ([address] 2020    Hypothyroidism 10/8/2020    Iron deficiency anemia 10/1/2019    MDRO (multiple drug resistant organisms) resistance     MRSA (methicillin resistant Staphylococcus aureus)     back wound abcess    Non compliance w medication regimen 3/30/2016    Other disorders of kidney and ureter     Pregnancy 3/30/2016    16 weeks    Previous  delivery affecting pregnancy, antepartum 3/2/2017    Previous stillbirth or demise, antepartum 2016    Seizure ([address] 2020    Severe pre-eclampsia in third trimester 2016    Shock liver 2/15/2021    Valvular endocarditis 11/10/2020    This Diagnosis was added to the Problem List based on transcribed orders from Dr. Raudel Shah   Past Surgical History:   Procedure Laterality Date    BACK SURGERY      abscess     SECTION      x2    CHOLECYSTECTOMY, LAPAROSCOPIC N/A 2019    CHOLECYSTECTOMY LAPAROSCOPIC performed by Shella Skiff, MD at [facility] 2018    COLONOSCOPY WITH BIOPSY performed by Maninder Waite MD at [facility] N/A 2018    COLONOSCOPY WITH BIOPSY performed by Suresh Lemons MD at [address] [place] [address] ECHO COMPL [address]  2012    EF 57%    ECHO COMPL W DOP COLOR FLOW  6/10/2013         EMBOLECTOMY N/A 2020    ANGIOVAC, VEGECTOMY, YULISSA -- REQS ROOM 3 performed by Olivia Pittman MD at [address] [place] [address] Left 2/23/2021    LEFT LEG INCISION AND DRAINAGE, DEBRIDEMENT, WOUND VAC APPLICATION performed by Aramis Eid DPM at [address] [place] [address] Left 5/18/2021    LEFT LEG DEBRIDEMENT BIOPSY POSS APPLICATION WOUND VAC performed by Aramis Eid DPM at [place] [address] N/A 6/26/2020    LAPAROSCOPIC INSERTION PERITONEAL DIALYSIS CATHETER performed by Cristina Olivia MD at [facility] ESOPHAGOGASTRODUODENOSCOPY TRANSORAL DIAGNOSTIC N/A 5/30/2018    EGD ESOPHAGOGASTRODUODENOSCOPY performed by Elizabeth Cano MD at [address] [place] [address] TRANSESOPHAGEAL ECHOCARDIOGRAM N/A 10/19/2020    TRANSESOPHAGEAL ECHOCARDIOGRAM WITH BUBBLE STUDY performed by Otis Romero MD at [address] [place] [address] TUNNELED VENOUS PORT PLACEMENT  04/2018    UPPER GASTROINTESTINAL ENDOSCOPY  12/18/2018    EGD BIOPSY performed by Pj Trujillo MD at [address] [place] [address] 10/11/2019    EGD ESOPHAGOGASTRODUODENOSCOPY performed by Elisha Garcia DO at [facility]:   reports that she quit smoking about 4 months ago. Her smoking use included cigarettes. She has a 3.00 pack-year smoking history. She has never used smokeless tobacco.  Alcohol:   reports no history of alcohol use. Drugs:   reports no history of drug use.     FAMILY HISTORY   Family History   Problem Relation Age of Onset    Asthma Mother     Hypertension Mother     High Blood Pressure Mother     Diabetes Mother     Asthma Brother     High Blood Pressure Father        ALLERGIES AND DRUG REACTIONS   Allergies   Allergen Reactions    Cefepime Other (See Comments)     \" neurological side effect- slurred speech and confusion    Toradol [Ketorolac Tromethamine] Anaphylaxis and Hives       CURRENT MEDICATIONS   Current Outpatient Medications   Medication Sig Dispense Refill    mupirocin (BACTROBAN) 2 % ointment Apply 15 g topically daily Apply topically daily to left leg.       folic acid (FOLVITE) 1 MG tablet Take 1 tablet by mouth daily 30 tablet 3    sulfamethoxazole-trimethoprim (BACTRIM;SEPTRA) 400-80 MG per tablet Take 1 tablet by mouth daily for 14 days 14 tablet 0    mirtazapine (REMERON) 15 MG tablet Take 15 mg by mouth nightly      hydrOXYzine (ATARAX) 25 MG tablet Take 25 mg by mouth daily      polyethylene glycol (GLYCOLAX) 17 g packet Take 17 g by mouth daily as needed for Constipation      albuterol (PROVENTIL) (2.5 MG/3ML) 0.083% nebulizer solution Take 2.5 mg by nebulization every 6 hours as needed for Wheezing      ARIPiprazole (ABILIFY) 5 MG tablet Take 5 mg by mouth nightly      docusate sodium (COLACE) 100 MG capsule Take 100 mg by mouth 2 times daily      Glucagon, rDNA, (GLUCAGON EMERGENCY IJ) Inject as directed      glucose (GLUTOSE) 40 % GEL Take 15 g by mouth See Admin Instructions      sevelamer (RENVELA) 800 MG tablet Take by mouth 1600 mg three times daily with meals and 800 mg at bedtime      hydrALAZINE (APRESOLINE) 10 MG tablet Take 1 tablet by mouth 2 times daily 60 tablet 1    metoprolol tartrate (LOPRESSOR) 25 MG tablet Take 1 tablet by mouth daily 60 tablet 0    pantoprazole (PROTONIX) 40 MG tablet Take 1 tablet by mouth every morning (before breakfast) 30 tablet 3    levothyroxine (SYNTHROID) 75 MCG tablet TAKE 1 TABLET BY MOUTH IN THE MORNING BEFORE BREAKFAST 60 tablet 0    insulin glargine (LANTUS) 100 UNIT/ML injection vial Inject 3 Units into the skin 2 times daily (Patient taking differently: Inject 3 Units into the skin 2 times daily 6 units in am     3 units in the pm) 1 vial 3    insulin lispro (HUMALOG) 100 UNIT/ML injection vial Inject 0-3 Units into the skin nightly **Corrective Bedtime (50%) Low Dose Algorithm** Glucose: Dose:  - No Insulin, 140-249 1 Unit, 250-349 2 Units, Over 350 3 Units (Patient taking differently: Inject 0-3 Units into the skin nightly Med Sliding Scale only) 1 vial 3    insulin lispro (HUMALOG) 100 UNIT/ML injection vial Inject 0-6 Units into the skin 3 times daily (with meals) **Corrective Low Dose Algorithm** Glucose: Dose: -No Insulin, 140-199 - 1 Unit, 200-249 - 2 Units, 250-299 - 3 Units, 300-349 - 4 Units, 350-399 - 5 Units, Over 399 - 6 Units 1 vial 3    epoetin nena-epbx (RETACRIT) 3000 UNIT/ML SOLN injection Inject 1 mL into the skin three times a week 21.9 mL     rOPINIRole (REQUIP) 0.25 MG tablet Take 0.25 mg by mouth nightly      blood glucose test strips (CONTOUR NEXT TEST) strip 1 each by In Vitro route 5 times daily As needed. 150 each 5    metoclopramide (REGLAN) 5 MG tablet Take 5 mg by mouth 3 times daily        No current facility-administered medications for this visit. Review of Systems  Constitutional: No fever, no chills, no diaphoresis, no generalized weakness. HEENT: No blurred vision, No sore throat, no ear pain, no hair loss  Neck: denied any neck swelling, difficulty swallowing,   Cardio-pulmonary: No CP, SOB or palpitation, No orthopnea or PND. No cough or wheezing. GI: No N/V/D, no constipation, No abdominal pain, no melena or hematochezia   : Denied any dysuria, hematuria, flank pain, discharge, or incontinence. Skin: denied any rash, ulcer, Hirsute, or hyperpigmentation. MSK: denied any joint deformity, joint pain/swelling, muscle pain, or back pain.   Neuro: no numbness, no tingling, no weakness, _    OBJECTIVE    BP (!) 89/52   Pulse 67   Resp 16   Ht 5' 4\" (1.626 m)   LMP  (LMP Unknown)   SpO2 97%   BMI 24.19 kg/m²   BP Readings from Last 4 Encounters:   06/01/21 (!) 89/52   05/24/21 (!) 142/84   05/20/21 (!) 157/109   05/18/21 107/80     Wt Readings from Last 6 Encounters:   05/20/21 140 lb 14.4 oz (63.9 kg)   04/19/21 146 lb (66.2 kg)   02/26/21 164 lb 3.9 oz (74.5 kg)   01/24/21 143 lb (64.9 kg)   01/07/21 143 lb (64.9 kg)   12/29/20 148 lb 5.9 oz (67.3 kg)     Physical examination:  General: awake alert, oriented x3, no abnormal position or movements. HEENT: normocephalic non traumatic, no exophthalmos   Neck: supple, no LN enlargement, no thyromegaly, no thyroid tenderness, no JVD. Pulm: Clear equal air entry no added sounds, no wheezing or rhonchi    CVS: S1 + S2, no murmur, no heave  Abd: soft lax, no tenderness, no organomegaly, audible bowel sounds. Skin: warm, no lesions, no rash.  no Ulcers, No acanthosis nigricans   Neuro: CN intact, sensation decreased bilateral , muscle power normal  Psych: normal mood, and affect    Review of Laboratory Data:  I personally reviewed the following lab:  Lab Results   Component Value Date/Time    WBC 6.9 05/20/2021 07:29 AM    RBC 2.99 (L) 05/20/2021 07:29 AM    HGB 8.8 (L) 05/20/2021 07:29 AM    HCT 27.5 (L) 05/20/2021 07:29 AM    MCV 92.0 05/20/2021 07:29 AM    MCH 29.4 05/20/2021 07:29 AM    MCHC 32.0 05/20/2021 07:29 AM    RDW 13.4 05/20/2021 07:29 AM     05/20/2021 07:29 AM    MPV 10.1 05/20/2021 07:29 AM    BANDS 3 06/25/2012 03:15 PM      Lab Results   Component Value Date/Time     05/20/2021 07:29 AM    K 4.0 05/20/2021 07:29 AM    K 1.8 (LL) 05/14/2021 02:26 PM    CO2 27 05/20/2021 07:29 AM    BUN 33 (H) 05/20/2021 07:29 AM    CREATININE 7.5 (H) 05/20/2021 07:29 AM    CALCIUM 9.1 05/20/2021 07:29 AM    LABGLOM 8 05/20/2021 07:29 AM    GFRAA 8 05/20/2021 07:29 AM      Lab Results   Component Value Date/Time    TSH 7.940 (H) 02/15/2021 08:39 AM    T4FREE 1.08 02/15/2021 08:39 AM    P8ICJLS 8.6 11/05/2015 02:20 AM    FT3 1.3 (L) 10/31/2020 10:43 AM    G2LLNFQ 36.73 (L) 07/20/2020 02:00 PM     Lab Results   Component Value Date    LABA1C 11.8 06/01/2021    GLUCOSE 216 05/20/2021    GLUCOSE 130 05/18/2012    MALBCR 2949.3 01/15/2020    LABMICR 1740.1 01/15/2020    LABCREA 59 01/15/2020    LABCREA 61 01/15/2020     Lab Results   Component Value Date    LABA1C 11.8 06/01/2021    LABA1C 6.2 11/09/2020    LABA1C 8.6 Orders   1. Type 1 diabetes mellitus with other specified complication (HCC)  POCT glycosylated hemoglobin (Hb A1C)    insulin lispro, 1 Unit Dial, (HUMALOG KWIKPEN) 100 UNIT/ML SOPN    insulin glargine (LANTUS SOLOSTAR) 100 UNIT/ML injection pen    Insulin Pen Needle (BD PEN NEEDLE NAV U/F) 32G X 4 MM MISC    blood glucose monitor strips   2. Insulin pump in place     3. ESRD (end stage renal disease) ([facility]       Dottie Saha MD  Endocrinologist, [facility] Diabetes Care and Endocrinology   [address] [place] [id number]   Phone: [phone]  Fax: [phone]  ---------------------------  An electronic signature was used to authenticate this note.  [address] Mundo Hendrix MD on 6/1/2021 at 11:05 AM

## 2021-06-03 ENCOUNTER — HOSPITAL ENCOUNTER (OUTPATIENT)
Dept: WOUND CARE | Age: 31
Discharge: HOME | End: 2021-06-03
Payer: COMMERCIAL

## 2021-06-07 ENCOUNTER — HOSPITAL ENCOUNTER (OUTPATIENT)
Dept: WOUND CARE | Age: 31
Discharge: HOME | End: 2021-06-07
Payer: COMMERCIAL

## 2021-06-21 ENCOUNTER — HOSPITAL ENCOUNTER (INPATIENT)
Age: 31
LOS: 10 days | Discharge: HOME | DRG: 420 | End: 2021-07-02
Attending: EMERGENCY MEDICINE | Admitting: INTERNAL MEDICINE
Payer: COMMERCIAL

## 2021-06-21 ENCOUNTER — APPOINTMENT (OUTPATIENT)
Dept: GENERAL RADIOLOGY | Age: 31
DRG: 420 | End: 2021-06-21
Payer: COMMERCIAL

## 2021-06-21 DIAGNOSIS — E13.10 DIABETIC KETOACIDOSIS WITHOUT COMA ASSOCIATED WITH OTHER SPECIFIED DIABETES MELLITUS (HCC): Primary | ICD-10-CM

## 2021-06-21 DIAGNOSIS — N39.0 URINARY TRACT INFECTION WITHOUT HEMATURIA, SITE UNSPECIFIED: ICD-10-CM

## 2021-06-21 DIAGNOSIS — N18.9 CHRONIC KIDNEY DISEASE, UNSPECIFIED CKD STAGE: ICD-10-CM

## 2021-06-21 DIAGNOSIS — E87.5 HYPERKALEMIA: ICD-10-CM

## 2021-06-21 LAB
BASOPHILS ABSOLUTE: 0.09 E9/L (ref 0–0.2)
BASOPHILS RELATIVE PERCENT: 0.8 % (ref 0–2)
EOSINOPHILS ABSOLUTE: 0.09 E9/L (ref 0.05–0.5)
EOSINOPHILS RELATIVE PERCENT: 0.8 % (ref 0–6)
FUNGUS (MYCOLOGY) CULTURE: ABNORMAL
FUNGUS STAIN: ABNORMAL
HCG, URINE, POC: NEGATIVE
HCT VFR BLD CALC: 31.7 % (ref 34–48)
HEMOGLOBIN: 9.6 G/DL (ref 11.5–15.5)
IMMATURE GRANULOCYTES #: 0.07 E9/L
IMMATURE GRANULOCYTES %: 0.6 % (ref 0–5)
LACTIC ACID, SEPSIS: 2.2 MMOL/L (ref 0.5–1.9)
LYMPHOCYTES ABSOLUTE: 2.24 E9/L (ref 1.5–4)
LYMPHOCYTES RELATIVE PERCENT: 19.7 % (ref 20–42)
Lab: NORMAL
MCH RBC QN AUTO: 30.4 PG (ref 26–35)
MCHC RBC AUTO-ENTMCNC: 30.3 % (ref 32–34.5)
MCV RBC AUTO: 100.3 FL (ref 80–99.9)
METER GLUCOSE: >500 MG/DL (ref 74–99)
MONOCYTES ABSOLUTE: 0.35 E9/L (ref 0.1–0.95)
MONOCYTES RELATIVE PERCENT: 3.1 % (ref 2–12)
NEGATIVE QC PASS/FAIL: NORMAL
NEUTROPHILS ABSOLUTE: 8.52 E9/L (ref 1.8–7.3)
NEUTROPHILS RELATIVE PERCENT: 75 % (ref 43–80)
ORGANISM: ABNORMAL
PDW BLD-RTO: 14.2 FL (ref 11.5–15)
PH VENOUS: 7.45 (ref 7.35–7.45)
PLATELET # BLD: 259 E9/L (ref 130–450)
PMV BLD AUTO: 10 FL (ref 7–12)
POSITIVE QC PASS/FAIL: NORMAL
RBC # BLD: 3.16 E12/L (ref 3.5–5.5)
TROPONIN, HIGH SENSITIVITY: 251 NG/L (ref 0–9)
WBC # BLD: 11.4 E9/L (ref 4.5–11.5)

## 2021-06-21 PROCEDURE — 36556 INSERT NON-TUNNEL CV CATH: CPT

## 2021-06-21 PROCEDURE — 93005 ELECTROCARDIOGRAM TRACING: CPT | Performed by: EMERGENCY MEDICINE

## 2021-06-21 PROCEDURE — 96375 TX/PRO/DX INJ NEW DRUG ADDON: CPT

## 2021-06-21 PROCEDURE — 96365 THER/PROPH/DIAG IV INF INIT: CPT

## 2021-06-21 PROCEDURE — 71045 X-RAY EXAM CHEST 1 VIEW: CPT

## 2021-06-21 PROCEDURE — 99284 EMERGENCY DEPT VISIT MOD MDM: CPT

## 2021-06-21 RX ORDER — 0.9 % SODIUM CHLORIDE 0.9 %
1000 INTRAVENOUS SOLUTION INTRAVENOUS ONCE
Status: COMPLETED | OUTPATIENT
Start: 2021-06-21 | End: 2021-06-22

## 2021-06-21 NOTE — Clinical Note
Patient Class: Inpatient [101]   REQUIRED: Diagnosis: DKA, type 1, not at goal [facility]) [[id number]]   Estimated Length of Stay: Estimated stay of more than 2 midnights   Future Attending Provider: Joshua Echeverria [[id number]]

## 2021-06-22 PROBLEM — F32.9 MAJOR DEPRESSIVE DISORDER: Status: ACTIVE | Noted: 2021-06-22

## 2021-06-22 PROBLEM — E10.10 DKA, TYPE 1, NOT AT GOAL (HCC): Status: ACTIVE | Noted: 2021-06-22

## 2021-06-22 PROBLEM — E87.0 HYPEROSMOLALITY: Status: ACTIVE | Noted: 2021-06-22

## 2021-06-22 LAB
ALBUMIN SERPL-MCNC: 2.9 G/DL (ref 3.5–5.2)
ALP BLD-CCNC: 222 U/L (ref 35–104)
ALT SERPL-CCNC: 23 U/L (ref 0–32)
ANION GAP SERPL CALCULATED.3IONS-SCNC: 11 MMOL/L (ref 7–16)
ANION GAP SERPL CALCULATED.3IONS-SCNC: 16 MMOL/L (ref 7–16)
ANION GAP SERPL CALCULATED.3IONS-SCNC: 20 MMOL/L (ref 7–16)
AST SERPL-CCNC: 26 U/L (ref 0–31)
BACTERIA: ABNORMAL /HPF
BETA-HYDROXYBUTYRATE: 3.94 MMOL/L (ref 0.02–0.27)
BILIRUB SERPL-MCNC: 0.3 MG/DL (ref 0–1.2)
BILIRUBIN URINE: NEGATIVE
BLOOD, URINE: ABNORMAL
BUN BLDV-MCNC: 36 MG/DL (ref 6–20)
BUN BLDV-MCNC: 37 MG/DL (ref 6–20)
BUN BLDV-MCNC: 38 MG/DL (ref 6–20)
CALCIUM SERPL-MCNC: 7.9 MG/DL (ref 8.6–10.2)
CALCIUM SERPL-MCNC: 8.1 MG/DL (ref 8.6–10.2)
CALCIUM SERPL-MCNC: 8.7 MG/DL (ref 8.6–10.2)
CHLORIDE BLD-SCNC: 83 MMOL/L (ref 98–107)
CHLORIDE BLD-SCNC: 88 MMOL/L (ref 98–107)
CHLORIDE BLD-SCNC: 96 MMOL/L (ref 98–107)
CLARITY: ABNORMAL
CO2: 19 MMOL/L (ref 22–29)
CO2: 23 MMOL/L (ref 22–29)
CO2: 27 MMOL/L (ref 22–29)
COLOR: YELLOW
CREAT SERPL-MCNC: 7.9 MG/DL (ref 0.5–1)
CREAT SERPL-MCNC: 7.9 MG/DL (ref 0.5–1)
CREAT SERPL-MCNC: 8 MG/DL (ref 0.5–1)
EKG ATRIAL RATE: 109 BPM
EKG P AXIS: 58 DEGREES
EKG P-R INTERVAL: 114 MS
EKG Q-T INTERVAL: 366 MS
EKG QRS DURATION: 90 MS
EKG QTC CALCULATION (BAZETT): 492 MS
EKG R AXIS: 47 DEGREES
EKG T AXIS: 49 DEGREES
EKG VENTRICULAR RATE: 109 BPM
GFR AFRICAN AMERICAN: 7
GFR NON-AFRICAN AMERICAN: 7 ML/MIN/1.73
GLUCOSE BLD-MCNC: 739 MG/DL (ref 74–99)
GLUCOSE BLD-MCNC: 78 MG/DL (ref 74–99)
GLUCOSE BLD-MCNC: 874 MG/DL (ref 74–99)
GLUCOSE URINE: >=1000 MG/DL
KETONES, URINE: NEGATIVE MG/DL
LACTIC ACID, SEPSIS: 1.8 MMOL/L (ref 0.5–1.9)
LEUKOCYTE ESTERASE, URINE: NEGATIVE
MAGNESIUM: 2.7 MG/DL (ref 1.6–2.6)
METER GLUCOSE: 115 MG/DL (ref 74–99)
METER GLUCOSE: 194 MG/DL (ref 74–99)
METER GLUCOSE: 226 MG/DL (ref 74–99)
METER GLUCOSE: 324 MG/DL (ref 74–99)
METER GLUCOSE: 493 MG/DL (ref 74–99)
METER GLUCOSE: 69 MG/DL (ref 74–99)
METER GLUCOSE: 82 MG/DL (ref 74–99)
METER GLUCOSE: 83 MG/DL (ref 74–99)
METER GLUCOSE: 90 MG/DL (ref 74–99)
METER GLUCOSE: >500 MG/DL (ref 74–99)
METER GLUCOSE: >500 MG/DL (ref 74–99)
NITRITE, URINE: NEGATIVE
PH UA: 7.5 (ref 5–9)
PHOSPHORUS: 2.9 MG/DL (ref 2.5–4.5)
POTASSIUM REFLEX MAGNESIUM: 4 MMOL/L (ref 3.5–5)
POTASSIUM REFLEX MAGNESIUM: 5.6 MMOL/L (ref 3.5–5)
POTASSIUM SERPL-SCNC: 4.4 MMOL/L (ref 3.5–5)
PROTEIN UA: >=300 MG/DL
RBC UA: ABNORMAL /HPF (ref 0–2)
SODIUM BLD-SCNC: 122 MMOL/L (ref 132–146)
SODIUM BLD-SCNC: 127 MMOL/L (ref 132–146)
SODIUM BLD-SCNC: 134 MMOL/L (ref 132–146)
SPECIFIC GRAVITY UA: 1.01 (ref 1–1.03)
TOTAL PROTEIN: 6.6 G/DL (ref 6.4–8.3)
TROPONIN, HIGH SENSITIVITY: 253 NG/L (ref 0–9)
UROBILINOGEN, URINE: 0.2 E.U./DL
WBC UA: ABNORMAL /HPF (ref 0–5)

## 2021-06-22 PROCEDURE — 83605 ASSAY OF LACTIC ACID: CPT

## 2021-06-22 PROCEDURE — 6370000000 HC RX 637 (ALT 250 FOR IP): Performed by: INTERNAL MEDICINE

## 2021-06-22 PROCEDURE — 6360000002 HC RX W HCPCS: Performed by: NURSE PRACTITIONER

## 2021-06-22 PROCEDURE — 6370000000 HC RX 637 (ALT 250 FOR IP): Performed by: STUDENT IN AN ORGANIZED HEALTH CARE EDUCATION/TRAINING PROGRAM

## 2021-06-22 PROCEDURE — 6370000000 HC RX 637 (ALT 250 FOR IP): Performed by: NURSE PRACTITIONER

## 2021-06-22 PROCEDURE — 6360000002 HC RX W HCPCS: Performed by: STUDENT IN AN ORGANIZED HEALTH CARE EDUCATION/TRAINING PROGRAM

## 2021-06-22 PROCEDURE — 36592 COLLECT BLOOD FROM PICC: CPT

## 2021-06-22 PROCEDURE — 90945 DIALYSIS ONE EVALUATION: CPT

## 2021-06-22 PROCEDURE — 2580000003 HC RX 258: Performed by: INTERNAL MEDICINE

## 2021-06-22 PROCEDURE — 84100 ASSAY OF PHOSPHORUS: CPT

## 2021-06-22 PROCEDURE — 3E1M39Z IRRIGATION OF PERITONEAL CAVITY USING DIALYSATE, PERCUTANEOUS APPROACH: ICD-10-PCS | Performed by: INTERNAL MEDICINE

## 2021-06-22 PROCEDURE — 6360000002 HC RX W HCPCS: Performed by: INTERNAL MEDICINE

## 2021-06-22 PROCEDURE — 2580000003 HC RX 258: Performed by: NURSE PRACTITIONER

## 2021-06-22 PROCEDURE — 99223 1ST HOSP IP/OBS HIGH 75: CPT | Performed by: INTERNAL MEDICINE

## 2021-06-22 PROCEDURE — 83735 ASSAY OF MAGNESIUM: CPT

## 2021-06-22 PROCEDURE — 76937 US GUIDE VASCULAR ACCESS: CPT

## 2021-06-22 PROCEDURE — 36415 COLL VENOUS BLD VENIPUNCTURE: CPT

## 2021-06-22 PROCEDURE — 93010 ELECTROCARDIOGRAM REPORT: CPT | Performed by: INTERNAL MEDICINE

## 2021-06-22 PROCEDURE — 02HV33Z INSERTION OF INFUSION DEVICE INTO SUPERIOR VENA CAVA, PERCUTANEOUS APPROACH: ICD-10-PCS | Performed by: INTERNAL MEDICINE

## 2021-06-22 PROCEDURE — 82962 GLUCOSE BLOOD TEST: CPT

## 2021-06-22 PROCEDURE — 36556 INSERT NON-TUNNEL CV CATH: CPT

## 2021-06-22 PROCEDURE — 80048 BASIC METABOLIC PNL TOTAL CA: CPT

## 2021-06-22 PROCEDURE — 84484 ASSAY OF TROPONIN QUANT: CPT

## 2021-06-22 PROCEDURE — 97161 PT EVAL LOW COMPLEX 20 MIN: CPT | Performed by: PHYSICAL THERAPIST

## 2021-06-22 PROCEDURE — 1200000000 HC SEMI PRIVATE

## 2021-06-22 PROCEDURE — 2580000003 HC RX 258: Performed by: STUDENT IN AN ORGANIZED HEALTH CARE EDUCATION/TRAINING PROGRAM

## 2021-06-22 RX ORDER — NICOTINE POLACRILEX 4 MG
15 LOZENGE BUCCAL PRN
Status: DISCONTINUED | OUTPATIENT
Start: 2021-06-22 | End: 2021-07-02 | Stop reason: HOSPADM

## 2021-06-22 RX ORDER — DEXTROSE AND SODIUM CHLORIDE 5; .9 G/100ML; G/100ML
INJECTION, SOLUTION INTRAVENOUS CONTINUOUS
Status: DISCONTINUED | OUTPATIENT
Start: 2021-06-22 | End: 2021-07-02 | Stop reason: HOSPADM

## 2021-06-22 RX ORDER — METOCLOPRAMIDE 5 MG/1
5 TABLET ORAL 3 TIMES DAILY
Status: DISCONTINUED | OUTPATIENT
Start: 2021-06-22 | End: 2021-07-02 | Stop reason: HOSPADM

## 2021-06-22 RX ORDER — LEVOTHYROXINE SODIUM 0.07 MG/1
75 TABLET ORAL DAILY
Status: DISCONTINUED | OUTPATIENT
Start: 2021-06-22 | End: 2021-07-02 | Stop reason: HOSPADM

## 2021-06-22 RX ORDER — DEXTROSE MONOHYDRATE 50 MG/ML
100 INJECTION, SOLUTION INTRAVENOUS PRN
Status: CANCELLED | OUTPATIENT
Start: 2021-06-22

## 2021-06-22 RX ORDER — MIRTAZAPINE 15 MG/1
15 TABLET, FILM COATED ORAL NIGHTLY
Status: DISCONTINUED | OUTPATIENT
Start: 2021-06-22 | End: 2021-07-02 | Stop reason: HOSPADM

## 2021-06-22 RX ORDER — POTASSIUM CHLORIDE 7.45 MG/ML
10 INJECTION INTRAVENOUS PRN
Status: DISCONTINUED | OUTPATIENT
Start: 2021-06-22 | End: 2021-06-22

## 2021-06-22 RX ORDER — ACETAMINOPHEN 650 MG/1
650 SUPPOSITORY RECTAL EVERY 6 HOURS PRN
Status: DISCONTINUED | OUTPATIENT
Start: 2021-06-22 | End: 2021-07-02 | Stop reason: HOSPADM

## 2021-06-22 RX ORDER — POLYETHYLENE GLYCOL 3350 17 G/17G
17 POWDER, FOR SOLUTION ORAL DAILY PRN
Status: DISCONTINUED | OUTPATIENT
Start: 2021-06-22 | End: 2021-06-22

## 2021-06-22 RX ORDER — SEVELAMER CARBONATE 800 MG/1
800 TABLET, FILM COATED ORAL
Status: DISCONTINUED | OUTPATIENT
Start: 2021-06-22 | End: 2021-07-02 | Stop reason: HOSPADM

## 2021-06-22 RX ORDER — 0.9 % SODIUM CHLORIDE 0.9 %
15 INTRAVENOUS SOLUTION INTRAVENOUS ONCE
Status: DISCONTINUED | OUTPATIENT
Start: 2021-06-22 | End: 2021-07-02 | Stop reason: HOSPADM

## 2021-06-22 RX ORDER — MAGNESIUM SULFATE 1 G/100ML
1000 INJECTION INTRAVENOUS PRN
Status: DISCONTINUED | OUTPATIENT
Start: 2021-06-22 | End: 2021-06-22

## 2021-06-22 RX ORDER — PANTOPRAZOLE SODIUM 40 MG/1
40 TABLET, DELAYED RELEASE ORAL
Status: DISCONTINUED | OUTPATIENT
Start: 2021-06-22 | End: 2021-07-02 | Stop reason: HOSPADM

## 2021-06-22 RX ORDER — ONDANSETRON 2 MG/ML
4 INJECTION INTRAMUSCULAR; INTRAVENOUS EVERY 6 HOURS PRN
Status: DISCONTINUED | OUTPATIENT
Start: 2021-06-22 | End: 2021-07-02 | Stop reason: HOSPADM

## 2021-06-22 RX ORDER — PROMETHAZINE HYDROCHLORIDE 25 MG/1
12.5 TABLET ORAL EVERY 6 HOURS PRN
Status: DISCONTINUED | OUTPATIENT
Start: 2021-06-22 | End: 2021-07-02 | Stop reason: HOSPADM

## 2021-06-22 RX ORDER — ARIPIPRAZOLE 5 MG/1
5 TABLET ORAL NIGHTLY
Status: DISCONTINUED | OUTPATIENT
Start: 2021-06-22 | End: 2021-07-02 | Stop reason: HOSPADM

## 2021-06-22 RX ORDER — ALBUTEROL SULFATE 2.5 MG/3ML
2.5 SOLUTION RESPIRATORY (INHALATION) EVERY 6 HOURS PRN
Status: DISCONTINUED | OUTPATIENT
Start: 2021-06-22 | End: 2021-07-02 | Stop reason: HOSPADM

## 2021-06-22 RX ORDER — FENTANYL CITRATE 50 UG/ML
50 INJECTION, SOLUTION INTRAMUSCULAR; INTRAVENOUS ONCE
Status: COMPLETED | OUTPATIENT
Start: 2021-06-22 | End: 2021-06-22

## 2021-06-22 RX ORDER — HEPARIN SODIUM 5000 [USP'U]/ML
5000 INJECTION, SOLUTION INTRAVENOUS; SUBCUTANEOUS EVERY 8 HOURS SCHEDULED
Status: DISCONTINUED | OUTPATIENT
Start: 2021-06-22 | End: 2021-07-02 | Stop reason: HOSPADM

## 2021-06-22 RX ORDER — FOLIC ACID 1 MG/1
1 TABLET ORAL DAILY
Status: DISCONTINUED | OUTPATIENT
Start: 2021-06-22 | End: 2021-07-02 | Stop reason: HOSPADM

## 2021-06-22 RX ORDER — INSULIN GLARGINE 100 [IU]/ML
7 INJECTION, SOLUTION SUBCUTANEOUS DAILY
Status: DISCONTINUED | OUTPATIENT
Start: 2021-06-22 | End: 2021-07-02 | Stop reason: HOSPADM

## 2021-06-22 RX ORDER — HYDRALAZINE HYDROCHLORIDE 10 MG/1
10 TABLET, FILM COATED ORAL 2 TIMES DAILY
Status: DISCONTINUED | OUTPATIENT
Start: 2021-06-22 | End: 2021-07-02 | Stop reason: HOSPADM

## 2021-06-22 RX ORDER — SODIUM CHLORIDE 9 MG/ML
INJECTION, SOLUTION INTRAVENOUS CONTINUOUS
Status: DISCONTINUED | OUTPATIENT
Start: 2021-06-22 | End: 2021-07-02 | Stop reason: HOSPADM

## 2021-06-22 RX ORDER — GENTAMICIN SULFATE 1 MG/G
CREAM TOPICAL DAILY
Status: DISCONTINUED | OUTPATIENT
Start: 2021-06-22 | End: 2021-07-02 | Stop reason: HOSPADM

## 2021-06-22 RX ORDER — HYDROXYZINE HYDROCHLORIDE 25 MG/1
25 TABLET, FILM COATED ORAL DAILY
Status: DISCONTINUED | OUTPATIENT
Start: 2021-06-22 | End: 2021-07-02 | Stop reason: HOSPADM

## 2021-06-22 RX ORDER — HEPARIN SODIUM 5000 [USP'U]/ML
5000 INJECTION, SOLUTION INTRAVENOUS; SUBCUTANEOUS EVERY 8 HOURS SCHEDULED
Status: DISCONTINUED | OUTPATIENT
Start: 2021-06-22 | End: 2021-06-22

## 2021-06-22 RX ORDER — HEPARIN SODIUM (PORCINE) LOCK FLUSH IV SOLN 100 UNIT/ML 100 UNIT/ML
100 SOLUTION INTRAVENOUS PRN
Status: DISCONTINUED | OUTPATIENT
Start: 2021-06-22 | End: 2021-07-02 | Stop reason: HOSPADM

## 2021-06-22 RX ORDER — SODIUM CHLORIDE 0.9 % (FLUSH) 0.9 %
5-40 SYRINGE (ML) INJECTION 2 TIMES DAILY
Status: DISCONTINUED | OUTPATIENT
Start: 2021-06-22 | End: 2021-07-02 | Stop reason: HOSPADM

## 2021-06-22 RX ORDER — DEXTROSE MONOHYDRATE 25 G/50ML
12.5 INJECTION, SOLUTION INTRAVENOUS PRN
Status: CANCELLED | OUTPATIENT
Start: 2021-06-22

## 2021-06-22 RX ORDER — DEXTROSE MONOHYDRATE 25 G/50ML
12.5 INJECTION, SOLUTION INTRAVENOUS PRN
Status: DISCONTINUED | OUTPATIENT
Start: 2021-06-22 | End: 2021-07-02 | Stop reason: HOSPADM

## 2021-06-22 RX ORDER — ROPINIROLE 0.25 MG/1
0.25 TABLET, FILM COATED ORAL NIGHTLY
Status: DISCONTINUED | OUTPATIENT
Start: 2021-06-22 | End: 2021-07-02 | Stop reason: HOSPADM

## 2021-06-22 RX ORDER — NICOTINE POLACRILEX 4 MG
15 LOZENGE BUCCAL PRN
Status: CANCELLED | OUTPATIENT
Start: 2021-06-22

## 2021-06-22 RX ORDER — SODIUM CHLORIDE 0.9 % (FLUSH) 0.9 %
10 SYRINGE (ML) INJECTION PRN
Status: DISCONTINUED | OUTPATIENT
Start: 2021-06-22 | End: 2021-07-02 | Stop reason: HOSPADM

## 2021-06-22 RX ORDER — DEXTROSE, SODIUM CHLORIDE, AND POTASSIUM CHLORIDE 5; .45; .15 G/100ML; G/100ML; G/100ML
INJECTION INTRAVENOUS CONTINUOUS PRN
Status: DISCONTINUED | OUTPATIENT
Start: 2021-06-22 | End: 2021-06-22

## 2021-06-22 RX ORDER — SODIUM CHLORIDE 9 MG/ML
INJECTION, SOLUTION INTRAVENOUS CONTINUOUS
Status: DISCONTINUED | OUTPATIENT
Start: 2021-06-22 | End: 2021-06-22

## 2021-06-22 RX ORDER — POLYETHYLENE GLYCOL 3350 17 G/17G
17 POWDER, FOR SOLUTION ORAL DAILY PRN
Status: DISCONTINUED | OUTPATIENT
Start: 2021-06-22 | End: 2021-07-02 | Stop reason: HOSPADM

## 2021-06-22 RX ORDER — SODIUM CHLORIDE 9 MG/ML
25 INJECTION, SOLUTION INTRAVENOUS PRN
Status: DISCONTINUED | OUTPATIENT
Start: 2021-06-22 | End: 2021-07-02 | Stop reason: HOSPADM

## 2021-06-22 RX ORDER — SODIUM CHLORIDE 9 MG/ML
INJECTION, SOLUTION INTRAVENOUS EVERY 12 HOURS
Status: DISCONTINUED | OUTPATIENT
Start: 2021-06-22 | End: 2021-07-01

## 2021-06-22 RX ORDER — ACETAMINOPHEN 325 MG/1
650 TABLET ORAL EVERY 6 HOURS PRN
Status: DISCONTINUED | OUTPATIENT
Start: 2021-06-22 | End: 2021-07-02 | Stop reason: HOSPADM

## 2021-06-22 RX ORDER — DEXTROSE MONOHYDRATE 50 MG/ML
100 INJECTION, SOLUTION INTRAVENOUS PRN
Status: DISCONTINUED | OUTPATIENT
Start: 2021-06-22 | End: 2021-07-02 | Stop reason: HOSPADM

## 2021-06-22 RX ORDER — SODIUM CHLORIDE 0.9 % (FLUSH) 0.9 %
10 SYRINGE (ML) INJECTION EVERY 12 HOURS SCHEDULED
Status: DISCONTINUED | OUTPATIENT
Start: 2021-06-22 | End: 2021-07-02 | Stop reason: HOSPADM

## 2021-06-22 RX ORDER — OXYCODONE HYDROCHLORIDE AND ACETAMINOPHEN 5; 325 MG/1; MG/1
1 TABLET ORAL EVERY 4 HOURS PRN
Status: DISCONTINUED | OUTPATIENT
Start: 2021-06-22 | End: 2021-07-02 | Stop reason: HOSPADM

## 2021-06-22 RX ADMIN — ARIPIPRAZOLE 5 MG: 5 TABLET ORAL at 22:32

## 2021-06-22 RX ADMIN — PIPERACILLIN SODIUM AND TAZOBACTAM SODIUM 3375 MG: 3; .375 INJECTION, POWDER, LYOPHILIZED, FOR SOLUTION INTRAVENOUS at 22:32

## 2021-06-22 RX ADMIN — WATER 1000 MG: 1 INJECTION INTRAMUSCULAR; INTRAVENOUS; SUBCUTANEOUS at 02:50

## 2021-06-22 RX ADMIN — HYDRALAZINE HYDROCHLORIDE 10 MG: 10 TABLET, FILM COATED ORAL at 10:04

## 2021-06-22 RX ADMIN — PANTOPRAZOLE SODIUM 40 MG: 40 TABLET, DELAYED RELEASE ORAL at 05:54

## 2021-06-22 RX ADMIN — POTASSIUM CHLORIDE 10 MEQ: 7.46 INJECTION, SOLUTION INTRAVENOUS at 05:57

## 2021-06-22 RX ADMIN — HYDROXYZINE HYDROCHLORIDE 25 MG: 25 TABLET ORAL at 09:43

## 2021-06-22 RX ADMIN — ROPINIROLE HYDROCHLORIDE 0.25 MG: 0.25 TABLET, FILM COATED ORAL at 22:32

## 2021-06-22 RX ADMIN — OXYCODONE AND ACETAMINOPHEN 1 TABLET: 5; 325 TABLET ORAL at 23:23

## 2021-06-22 RX ADMIN — FENTANYL CITRATE 50 MCG: 50 INJECTION, SOLUTION INTRAMUSCULAR; INTRAVENOUS at 02:30

## 2021-06-22 RX ADMIN — METOCLOPRAMIDE HYDROCHLORIDE 5 MG: 5 TABLET ORAL at 22:45

## 2021-06-22 RX ADMIN — SODIUM CHLORIDE 0.1 UNITS/KG/HR: 9 INJECTION, SOLUTION INTRAVENOUS at 02:30

## 2021-06-22 RX ADMIN — HEPARIN SODIUM 5000 UNITS: 5000 INJECTION INTRAVENOUS; SUBCUTANEOUS at 05:56

## 2021-06-22 RX ADMIN — INSULIN HUMAN 6 UNITS: 100 INJECTION, SOLUTION PARENTERAL at 04:53

## 2021-06-22 RX ADMIN — METOCLOPRAMIDE HYDROCHLORIDE 5 MG: 5 TABLET ORAL at 09:41

## 2021-06-22 RX ADMIN — FOLIC ACID 1 MG: 1 TABLET ORAL at 09:41

## 2021-06-22 RX ADMIN — SODIUM CHLORIDE: 9 INJECTION, SOLUTION INTRAVENOUS at 15:28

## 2021-06-22 RX ADMIN — POTASSIUM CHLORIDE 10 MEQ: 7.46 INJECTION, SOLUTION INTRAVENOUS at 07:53

## 2021-06-22 RX ADMIN — POTASSIUM CHLORIDE 10 MEQ: 7.46 INJECTION, SOLUTION INTRAVENOUS at 06:53

## 2021-06-22 RX ADMIN — METOPROLOL TARTRATE 25 MG: 25 TABLET, FILM COATED ORAL at 09:41

## 2021-06-22 RX ADMIN — METOCLOPRAMIDE HYDROCHLORIDE 5 MG: 5 TABLET ORAL at 15:31

## 2021-06-22 RX ADMIN — Medication 10 ML: at 20:51

## 2021-06-22 RX ADMIN — PIPERACILLIN SODIUM AND TAZOBACTAM SODIUM 3375 MG: 3; .375 INJECTION, POWDER, LYOPHILIZED, FOR SOLUTION INTRAVENOUS at 11:35

## 2021-06-22 RX ADMIN — SEVELAMER CARBONATE 800 MG: 800 TABLET, FILM COATED ORAL at 11:35

## 2021-06-22 RX ADMIN — HEPARIN SODIUM 5000 UNITS: 5000 INJECTION INTRAVENOUS; SUBCUTANEOUS at 22:41

## 2021-06-22 RX ADMIN — SODIUM CHLORIDE 1000 ML: 9 INJECTION, SOLUTION INTRAVENOUS at 02:22

## 2021-06-22 RX ADMIN — INSULIN GLARGINE 7 UNITS: 100 INJECTION, SOLUTION SUBCUTANEOUS at 11:35

## 2021-06-22 RX ADMIN — DEXTROSE AND SODIUM CHLORIDE: 5; 900 INJECTION, SOLUTION INTRAVENOUS at 08:04

## 2021-06-22 RX ADMIN — OXYCODONE AND ACETAMINOPHEN 1 TABLET: 5; 325 TABLET ORAL at 05:54

## 2021-06-22 RX ADMIN — Medication 10 ML: at 09:42

## 2021-06-22 RX ADMIN — SEVELAMER CARBONATE 800 MG: 800 TABLET, FILM COATED ORAL at 09:41

## 2021-06-22 RX ADMIN — SODIUM CHLORIDE: 9 INJECTION, SOLUTION INTRAVENOUS at 04:23

## 2021-06-22 RX ADMIN — HYDRALAZINE HYDROCHLORIDE 10 MG: 10 TABLET, FILM COATED ORAL at 20:42

## 2021-06-22 RX ADMIN — SODIUM CHLORIDE: 9 INJECTION, SOLUTION INTRAVENOUS at 05:46

## 2021-06-22 RX ADMIN — MIRTAZAPINE 15 MG: 15 TABLET, FILM COATED ORAL at 20:42

## 2021-06-22 RX ADMIN — HEPARIN SODIUM 5000 UNITS: 5000 INJECTION INTRAVENOUS; SUBCUTANEOUS at 15:23

## 2021-06-22 RX ADMIN — INSULIN LISPRO 2 UNITS: 100 INJECTION, SOLUTION INTRAVENOUS; SUBCUTANEOUS at 20:46

## 2021-06-22 RX ADMIN — LEVOTHYROXINE SODIUM 75 MCG: 75 TABLET ORAL at 05:54

## 2021-06-22 ASSESSMENT — PAIN SCALES - GENERAL
PAINLEVEL_OUTOF10: 8
PAINLEVEL_OUTOF10: 8
PAINLEVEL_OUTOF10: 4
PAINLEVEL_OUTOF10: 8
PAINLEVEL_OUTOF10: 8

## 2021-06-22 ASSESSMENT — ENCOUNTER SYMPTOMS
ABDOMINAL PAIN: 0
BLOOD IN STOOL: 0
SHORTNESS OF BREATH: 1
ABDOMINAL DISTENTION: 0
BACK PAIN: 0
SORE THROAT: 0
CONSTIPATION: 0
SINUS PRESSURE: 0
DIARRHEA: 0
EYE PAIN: 0
EYE DISCHARGE: 0
VOMITING: 0
EYE REDNESS: 0
WHEEZING: 0
COUGH: 0
NAUSEA: 1

## 2021-06-22 ASSESSMENT — PAIN DESCRIPTION - PAIN TYPE: TYPE: ACUTE PAIN

## 2021-06-22 ASSESSMENT — PAIN DESCRIPTION - LOCATION: LOCATION: LEG

## 2021-06-22 ASSESSMENT — PAIN DESCRIPTION - ORIENTATION: ORIENTATION: LEFT

## 2021-06-22 NOTE — PROGRESS NOTES
[id number] [place], [place] [address]ist   Progress Note    Admitting Date and Time: 6/21/2021 10:37 PM  Admit Dx: DKA, type 1, not at goal ([address] [E10.10]  Hyperosmolality [E87.0]    Subjective/interval history:    Pt admitted yesterday evening with HHS/possible early DKA. On insulin drip. States she still has aches all over. Per RN: Gap closed x1 on BMP    ROS: denies fever, chills, cp, sob, n/v, HA unless stated above.  sodium chloride flush  10 mL Intravenous 2 times per day    sodium chloride  15 mL/kg Intravenous Once    heparin (porcine)  5,000 Units Subcutaneous 3 times per day    metoclopramide  5 mg Oral TID    rOPINIRole  0.25 mg Oral Nightly    mirtazapine  15 mg Oral Nightly    hydrOXYzine  25 mg Oral Daily    ARIPiprazole  5 mg Oral Nightly    sevelamer  800 mg Oral TID WC    hydrALAZINE  10 mg Oral BID    metoprolol tartrate  25 mg Oral Daily    pantoprazole  40 mg Oral QAM AC    levothyroxine  75 mcg Oral Daily    folic acid  1 mg Oral Daily    piperacillin-tazobactam  3,375 mg Intravenous Q12H     glucose, 15 g, PRN  dextrose, 12.5 g, PRN  glucagon (rDNA), 1 mg, PRN  dextrose, 100 mL/hr, PRN  sodium chloride flush, 10 mL, PRN  sodium chloride, 25 mL, PRN  promethazine, 12.5 mg, Q6H PRN   Or  ondansetron, 4 mg, Q6H PRN  acetaminophen, 650 mg, Q6H PRN   Or  acetaminophen, 650 mg, Q6H PRN  potassium chloride, 10 mEq, PRN  magnesium sulfate, 1,000 mg, PRN  sodium phosphate IVPB, 10 mmol, PRN   Or  sodium phosphate IVPB, 15 mmol, PRN   Or  sodium phosphate IVPB, 20 mmol, PRN  polyethylene glycol, 17 g, Daily PRN  albuterol, 2.5 mg, Q6H PRN  oxyCODONE-acetaminophen, 1 tablet, Q4H PRN         Objective:    /86   Pulse 92   Temp 97.7 °F (36.5 °C) (Oral)   Resp 14   Ht 5' 4\" (1.626 m)   Wt 138 lb 3.7 oz (62.7 kg)   LMP 02/21/2021   SpO2 100%   BMI 23.73 kg/m²   General Appearance: Somnolent, but awakens easily to voice. Appears slightly anxious. Oriented x3.   Skin: warm and dry  Head: normocephalic and atraumatic  Eyes: pupils equal, round, and reactive to light, extraocular eye movements intact, conjunctivae normal  Neck: neck supple and non tender without mass   Pulmonary/Chest: Nonlabored on room air. Clear to auscultation bilaterally. Cardiovascular: normal rate, normal S1 and S2 and no carotid bruits  Abdomen: soft, non-tender, non-distended, normal bowel sounds, no masses or organomegaly  Extremities: no cyanosis, no clubbing and no edema  Neurologic: no cranial nerve deficit and speech normal      Recent Labs     06/21/21 2321 06/22/21  0345   * 127*   K 5.6* 4.0   CL 83* 88*   CO2 19* 23   BUN 38* 37*   CREATININE 7.9* 7.9*   GLUCOSE 874* 739*   CALCIUM 8.7 7.9*       Recent Labs     06/21/21 2321   ALKPHOS 222*   PROT 6.6   LABALBU 2.9*   BILITOT 0.3   AST 26   ALT 23       Recent Labs     06/21/21 2321   WBC 11.4   RBC 3.16*   HGB 9.6*   HCT 31.7*   .3*   MCH 30.4   MCHC 30.3*   RDW 14.2      MPV 10.0       Radiology:   XR CHEST PORTABLE   Final Result   No acute process. Assessment/Plan:  Principal Problem:    DKA, type 1, not at goal [facility])  Active Problems:    Hypertension    Uncontrolled type 1 diabetes mellitus with hyperglycemia, with long-term current use of insulin ([facility]    ESRD on peritoneal dialysis ([facility]    Acute cystitis    Hyperosmolality  Resolved Problems:    * No resolved hospital problems. *      1. HHS/possible early DKA  -IV fluids and insulin drip per protocol  -Continue serial BMP, museum, phosphorus with electrolyte replacement per protocol    2. Urinary tract infection/acute cystitis  -Continue IV ceftriaxone  -Urine culture pending    3. End-stage renal disease on peritoneal dialysis  -Nephrology consult for PD management    4. Uncontrolled type 1 diabetes mellitus  -Resume subcutaneous insulin regimen once off of drip    5. Essential hypertension  -Continue home hydralazine and metoprolol tartrate    6. Depression  -Continue home Abilify    DVT prophylaxis: Subcutaneous heparin  CODE STATUS: Full code    NOTE: This report was transcribed using voice recognition software. Every effort was made to ensure accuracy; however, inadvertent computerized transcription errors may be present.      Electronically signed by Jose Conklin DO on 6/22/2021 at 10:17 AM

## 2021-06-22 NOTE — CONSULTS
Department of Internal Medicine  Nephrology Attending Consult Note      Reason for Consult:  End-Stage Renal Disease  Requesting Physician: Dr. 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 In the emergency room she was found to have a glucose level of 874, beta hydroxybutyrate of 3.94. She was admitted to MICU.     Past Medical History:        Diagnosis Date    Acute congestive heart failure ([address]     BC (acute kidney injury) ([address] 10/1/2019    Cardiac arrest ([address] 2/15/2021    Cephalgia 10/9/2019    Chronic kidney disease     Depression     Diabetes mellitus ([address]     Diabetic gastroparesis associated with type 1 diabetes mellitus ([address] 12/17/2018    Diabetic ketoacidosis ([address] 8/27/2011    Diabetic ketoacidosis with coma associated with type 1 diabetes mellitus ([address] 6/26/2013    Diabetic polyneuropathy associated with type 1 diabetes mellitus ([address] 12/27/2020    Drug use complicating pregnancy in third trimester     Endocarditis 10/31/2020    ESRD (end stage renal disease) ([address] 9/29/2020    Hemodialysis patient ([address]     History of blood transfusion 11/2019    Hyperlipidemia 10/8/2020    Hyperosmolar hyperglycemic state (HHS) ([address] 11/20/2020    Hypothyroidism 10/8/2020    Iron deficiency anemia 10/1/2019    MDRO (multiple drug resistant organisms) resistance     MRSA (methicillin resistant Staphylococcus aureus)     back wound abcess    Non compliance w medication regimen 3/30/2016    Other disorders of kidney and ureter     Pregnancy 3/30/2016    16 weeks    Previous  delivery affecting pregnancy, antepartum 3/2/2017    Previous stillbirth or demise, antepartum 2016    Seizure ([address] 2020    Severe pre-eclampsia in third trimester 2016    Shock liver 2/15/2021    Valvular endocarditis 11/10/2020    This Diagnosis was added to the Problem List based on transcribed orders from Dr. Familia Ceron        Past Surgical History:        Procedure Laterality Date    BACK SURGERY      abscess   [address]      x2    CHOLECYSTECTOMY, LAPAROSCOPIC N/A 2019    CHOLECYSTECTOMY LAPAROSCOPIC performed by Dorota Dumont MD at [facility] N/A 2018    COLONOSCOPY WITH BIOPSY performed by Nelda Jalloh MD at [facility] N/A 2018    COLONOSCOPY WITH BIOPSY performed by Luna Childers MD at [address]  2012    EF 57%    ECHO COMPL W DOP COLOR FLOW  6/10/2013         EMBOLECTOMY N/A 2020    [address] [place] [address], [id number] [place] [address], YULISSA -- REQS ROOM 3 performed by Marcia Jorge MD at [address] Left 2021    LEFT LEG INCISION AND DRAINAGE, DEBRIDEMENT, WOUND VAC APPLICATION performed by Valdo Vickers DPM at [address] Left 2021    LEFT LEG DEBRIDEMENT BIOPSY POSS APPLICATION WOUND VAC performed by Valdo Vickers DPM at [place] [address] N/A 2020    LAPAROSCOPIC INSERTION PERITONEAL DIALYSIS CATHETER performed by Candelaria Santos MD at [place] [address] ESOPHAGOGASTRODUODENOSCOPY TRANSORAL DIAGNOSTIC N/A 2018    EGD ESOPHAGOGASTRODUODENOSCOPY performed by Nelda Jalloh MD at [address] [place] [address] TRANSESOPHAGEAL ECHOCARDIOGRAM N/A 10/19/2020    TRANSESOPHAGEAL ECHOCARDIOGRAM WITH BUBBLE STUDY performed by Shyam Garcia MD at [address] [facility] [address] [facility]  2018    UPPER GASTROINTESTINAL ENDOSCOPY 12/18/2018    EGD BIOPSY performed by Mindy Franz MD at [address] [place] [address],Third Floor N/A 10/11/2019    EGD ESOPHAGOGASTRODUODENOSCOPY performed by Halley Dietz DO at [place] [address]     Current Medications:    Current Facility-Administered Medications: glucose (GLUTOSE) 40 % oral gel 15 g, 15 g, Oral, PRN  dextrose 50 % IV solution, 12.5 g, Intravenous, PRN  glucagon (rDNA) injection 1 mg, 1 mg, Intramuscular, PRN  dextrose 5 % solution, 100 mL/hr, Intravenous, PRN  insulin regular (HUMULIN R;NOVOLIN R) 100 Units in sodium chloride 0.9 % 100 mL infusion, 0.1 Units/kg/hr, Intravenous, Continuous  sodium chloride flush 0.9 % injection 10 mL, 10 mL, Intravenous, 2 times per day  sodium chloride flush 0.9 % injection 10 mL, 10 mL, Intravenous, PRN  0.9 % sodium chloride infusion, 25 mL, Intravenous, PRN  promethazine (PHENERGAN) tablet 12.5 mg, 12.5 mg, Oral, Q6H PRN **OR** ondansetron (ZOFRAN) injection 4 mg, 4 mg, Intravenous, Q6H PRN  acetaminophen (TYLENOL) tablet 650 mg, 650 mg, Oral, Q6H PRN **OR** acetaminophen (TYLENOL) suppository 650 mg, 650 mg, Rectal, Q6H PRN  potassium chloride 10 mEq/100 mL IVPB (Peripheral Line), 10 mEq, Intravenous, PRN  magnesium sulfate 1000 mg in dextrose 5% 100 mL IVPB, 1,000 mg, Intravenous, PRN  sodium phosphate 10 mmol in dextrose 5 % 250 mL IVPB, 10 mmol, Intravenous, PRN **OR** sodium phosphate 15 mmol in dextrose 5 % 250 mL IVPB, 15 mmol, Intravenous, PRN **OR** sodium phosphate 20 mmol in dextrose 5 % 500 mL IVPB, 20 mmol, Intravenous, PRN  0.9 % sodium chloride bolus, 15 mL/kg, Intravenous, Once **FOLLOWED BY** 0.9 % sodium chloride infusion, , Intravenous, Continuous  polyethylene glycol (GLYCOLAX) packet 17 g, 17 g, Oral, Daily PRN  heparin (porcine) injection 5,000 Units, 5,000 Units, Subcutaneous, 3 times per day  metoclopramide (REGLAN) tablet 5 mg, 5 mg, Oral, TID  rOPINIRole (REQUIP) tablet 0.25 mg, 0.25 mg, Oral, Nightly  mirtazapine (REMERON) tablet 15 mg, 15 mg, Oral, Nightly  hydrOXYzine (ATARAX) tablet 25 mg, 25 mg, Oral, Daily  albuterol (PROVENTIL) nebulizer solution 2.5 mg, 2.5 mg, Nebulization, Q6H PRN  ARIPiprazole (ABILIFY) tablet 5 mg, 5 mg, Oral, Nightly  sevelamer (RENVELA) tablet 800 mg, 800 mg, Oral, TID WC  hydrALAZINE (APRESOLINE) tablet 10 mg, 10 mg, Oral, BID  metoprolol tartrate (LOPRESSOR) tablet 25 mg, 25 mg, Oral, Daily  pantoprazole (PROTONIX) tablet 40 mg, 40 mg, Oral, QAM AC  levothyroxine (SYNTHROID) tablet 75 mcg, 75 mcg, Oral, Daily  folic acid (FOLVITE) tablet 1 mg, 1 mg, Oral, Daily  oxyCODONE-acetaminophen (PERCOCET) 5-325 MG per tablet 1 tablet, 1 tablet, Oral, Q4H PRN  [START ON 6/23/2021] cefTRIAXone (ROCEPHIN) 1,000 mg in sterile water 10 mL IV syringe, 1,000 mg, Intravenous, Q24H  dextrose 5 % and 0.9 % sodium chloride infusion, , Intravenous, Continuous  Allergies:  Cefepime and Toradol [ketorolac tromethamine]    Social History:    TOBACCO:   reports that she quit smoking about 4 months ago. Her smoking use included cigarettes. She has a 3.00 pack-year smoking history. She has never used smokeless tobacco.  ETOH:   reports no history of alcohol use.     Family History:       Problem Relation Age of Onset   Lizzie Her Asthma Mother     Hypertension Mother     High Blood Pressure Mother     Diabetes Mother     Asthma Brother     High Blood Pressure Father      REVIEW OF SYSTEMS:    CONSTITUTIONAL:  positive for  fatigue and malaise  EYES:  negative  HEENT:  negative for  hearing loss and epistaxis  RESPIRATORY:  negative for  dry cough and dyspnea  CARDIOVASCULAR:  negative for  chest pain, dyspnea  GASTROINTESTINAL:  positive for nausea  GENITOURINARY:  negative  INTEGUMENT/BREAST:  negative  HEMATOLOGIC/LYMPHATIC:  negative  ALLERGIC/IMMUNOLOGIC:  positive for drug reactions  ENDOCRINE:  positive for weight changes    MUSCULOSKELETAL:  negative  NEUROLOGICAL:  negative    PHYSICAL EXAM:      Vitals:    VITALS: /86   Pulse 92   Temp 97.7 °F (36.5 °C) (Oral)   Resp 14   Ht 5' 4\" (1.626 m)   Wt 138 lb 3.7 oz (62.7 kg)   LMP 02/21/2021   SpO2 100%   BMI 23.73 kg/m²   24HR INTAKE/OUTPUT:  No intake or output data in the 24 hours ending 06/22/21 0901    PD Catheter Exam:  PD catheter exit site clean    Constitutional: Awake in no acute distress  HEENT:  Normocephalic, PERRL  Respiratory: Decreased breath sounds on the basis  Cardiovascular/Edema:  RRR, S1/S2  Gastrointestinal:  Soft, PD catheter exit site clean   Neurologic:  Nonfocal, AVELAR  Skin:  Warm, dry, no lesions  Other:  no edema      DATA:    CBC:   Lab Results   Component Value Date    WBC 11.4 06/21/2021    RBC 3.16 06/21/2021    HGB 9.6 06/21/2021    HCT 31.7 06/21/2021    .3 06/21/2021    MCH 30.4 06/21/2021    MCHC 30.3 06/21/2021    RDW 14.2 06/21/2021     06/21/2021    MPV 10.0 06/21/2021     CMP:    Lab Results   Component Value Date     06/22/2021    K 4.0 06/22/2021    CL 88 06/22/2021    CO2 23 06/22/2021    BUN 37 06/22/2021    CREATININE 7.9 06/22/2021    GFRAA 7 06/22/2021    LABGLOM 7 06/22/2021    GLUCOSE 739 06/22/2021    GLUCOSE 130 05/18/2012    PROT 6.6 06/21/2021    LABALBU 2.9 06/21/2021    LABALBU 4.1 05/18/2012    CALCIUM 7.9 06/22/2021    BILITOT 0.3 06/21/2021    ALKPHOS 222 06/21/2021    AST 26 06/21/2021    ALT 23 06/21/2021     Magnesium:    Lab Results   Component Value Date    MG 2.1 05/16/2021     Phosphorus:    Lab Results   Component Value Date    PHOS 4.3 05/20/2021     Radiology Review:      Chest x-ray June 21, 2021   No acute process.             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 In the emergency room she was found to have a glucose level of 874, beta hydroxybutyrate of 3.94. She was admitted to MICU. 1. ESRD on peritoneal dialysis, to continue CCPD 4 exchanges over 10 hours of 1.5% with long dwell of 2.5%  2. HTN, on hydralazine 10 mg twice daily and metoprolol 25 mg daily  3. UTI, on piperacillin-tazobactam  4. MBD of CKD, on sevelamer  5. Anemia of CKD,    Plan:    · CCPD 4 exchanges over 10 hours of 1.5% with long dwell of 2.5%  · Monitor electrolytes  · Insulin drip as per ICU  · Epoetin alpha 3000 units 3 times a week    Thank you very much Dr. Ebenezer Mccoy for allowing us to participate in the care of Xu.

## 2021-06-22 NOTE — CONSULTS
CRITICAL CARE PROGRESS NOTE    The patient's case was discussed in multidisciplinary rounds including critical care specialist, nursing, RT and pharmacy. Her evaluation is as follows:     36year old woman with PMH as described below admitted to ICU for management of     Diabetic ketoacidosis  --Cause: Lack of insulin   --Crystalloid administration:  --Insulin infusion at 0.1 unit/kg  --Glucose containing solution to be started once glucose measurement is 250  --Will start long acting insulin once anion gap closes X2 and will overlap insulin infusion for 1-2 hours. Chronic kidney disease  --Avoid nephrotoxins and dose medications according GFR  --RRT as per nephrology    Pyelonephritis/ Urinary tract infection  --Change ceftriaxone for zosyn as she has history of enterococcus in urine culture    Hypertension  --On antihypertensives    DVT prophylaxis with heparin   Nutrition: NPO  Vascular catheters: Peripheral lines  Physical therapy ordered  Goals of care: Full code      /69   Pulse 85   Temp 97.7 °F (36.5 °C) (Oral)   Resp 15   Ht 5' 4\" (1.626 m)   Wt 138 lb 3.7 oz (62.7 kg)   LMP 02/21/2021   SpO2 100%   BMI 23.73 kg/m²   General: Awake, oriented to place, time and person  HEENT: No head lesions, PERRL, EOMI, mouth without lesions, no nasal lesions, no cervical adenopathy palpated  Respiratory: Lungs with equal breath sounds bilaterally, no adventitious sounds auscultated, no accessory muscle use  CV: Regular rate, no murmurs, no JVD, no leg edema  Abdomen: Soft, non tender, + bowel sounds, PD cath present  Skin: Hydrated, adequate turgor, no rash, capillary refill <2 seconds  Extremities: Muscular strength 4/5 in 4 limbs, moves 4 limbs spontaneously, distal pulses present  Neurology: Awake and alert, follows commands, moves 4 limbs on command and spontaneously,  neck is supple, no meningitic signs present.     Recent Labs     06/22/21  0345   *   K 4.0   CL 88*   CO2 23   BUN 37* CREATININE 7.9*   GLUCOSE 739*   CALCIUM 7.9*       Recent Labs     06/21/21  2321   WBC 11.4   RBC 3.16*   HGB 9.6*   HCT 31.7*   .3*   MCH 30.4   MCHC 30.3*   RDW 14.2      MPV 10.0       No results for input(s): BC in the last 72 hours. No results for input(s): Bretta Thony in the last 72 hours.     24 HR INTAKE/OUTPUT:  No intake or output data in the 24 hours ending 06/22/21 1009  MEDICATIONS:   sodium chloride flush  10 mL Intravenous 2 times per day    sodium chloride  15 mL/kg Intravenous Once    heparin (porcine)  5,000 Units Subcutaneous 3 times per day    metoclopramide  5 mg Oral TID    rOPINIRole  0.25 mg Oral Nightly    mirtazapine  15 mg Oral Nightly    hydrOXYzine  25 mg Oral Daily    ARIPiprazole  5 mg Oral Nightly    sevelamer  800 mg Oral TID WC    hydrALAZINE  10 mg Oral BID    metoprolol tartrate  25 mg Oral Daily    pantoprazole  40 mg Oral QAM AC    levothyroxine  75 mcg Oral Daily    folic acid  1 mg Oral Daily      dextrose      insulin 0.23 Units/hr (06/22/21 0959)    sodium chloride      sodium chloride Stopped (06/22/21 0804)    dextrose 5 % and 0.9 % NaCl 75 mL/hr at 06/22/21 0804     glucose, dextrose, glucagon (rDNA), dextrose, sodium chloride flush, sodium chloride, promethazine **OR** ondansetron, acetaminophen **OR** acetaminophen, potassium chloride, magnesium sulfate, sodium phosphate IVPB **OR** sodium phosphate IVPB **OR** sodium phosphate IVPB, polyethylene glycol, albuterol, oxyCODONE-acetaminophen    OBJECTIVE:  Vitals:    06/22/21 0800   BP: 129/86   Pulse: 92   Resp: 14   Temp: 97.7 °F (36.5 °C)   SpO2: 100%           O2 Device: None (Room air)        LABS:  WBC   Date Value Ref Range Status   06/21/2021 11.4 4.5 - 11.5 E9/L Final   05/20/2021 6.9 4.5 - 11.5 E9/L Final   05/19/2021 7.2 4.5 - 11.5 E9/L Final     Hemoglobin   Date Value Ref Range Status   06/21/2021 9.6 (L) 11.5 - 15.5 g/dL Final   05/20/2021 8.8 (L) 11.5 - 15.5 g/dL Final 05/19/2021 8.0 (L) 11.5 - 15.5 g/dL Final     Hematocrit   Date Value Ref Range Status   06/21/2021 31.7 (L) 34.0 - 48.0 % Final   05/20/2021 27.5 (L) 34.0 - 48.0 % Final   05/19/2021 25.0 (L) 34.0 - 48.0 % Final     MCV   Date Value Ref Range Status   06/21/2021 100.3 (H) 80.0 - 99.9 fL Final   05/20/2021 92.0 80.0 - 99.9 fL Final   05/19/2021 90.6 80.0 - 99.9 fL Final     Platelets   Date Value Ref Range Status   06/21/2021 259 130 - 450 E9/L Final   05/20/2021 186 130 - 450 E9/L Final   05/19/2021 177 130 - 450 E9/L Final     Sodium   Date Value Ref Range Status   06/22/2021 127 (L) 132 - 146 mmol/L Final   06/21/2021 122 (L) 132 - 146 mmol/L Final   05/20/2021 139 132 - 146 mmol/L Final     Potassium   Date Value Ref Range Status   05/20/2021 4.0 3.5 - 5.0 mmol/L Final   05/19/2021 3.6 3.5 - 5.0 mmol/L Final   05/18/2021 3.0 (L) 3.5 - 5.0 mmol/L Final     Potassium reflex Magnesium   Date Value Ref Range Status   06/22/2021 4.0 3.5 - 5.0 mmol/L Final   06/21/2021 5.6 (H) 3.5 - 5.0 mmol/L Final     Comment:     Specimen is moderately Hemolyzed. Result may be artificially increased.    05/14/2021 1.8 (LL) 3.5 - 5.0 mmol/L Final     Chloride   Date Value Ref Range Status   06/22/2021 88 (L) 98 - 107 mmol/L Final   06/21/2021 83 (L) 98 - 107 mmol/L Final   05/20/2021 99 98 - 107 mmol/L Final     CO2   Date Value Ref Range Status   06/22/2021 23 22 - 29 mmol/L Final   06/21/2021 19 (L) 22 - 29 mmol/L Final   05/20/2021 27 22 - 29 mmol/L Final     BUN   Date Value Ref Range Status   06/22/2021 37 (H) 6 - 20 mg/dL Final   06/21/2021 38 (H) 6 - 20 mg/dL Final   05/20/2021 33 (H) 6 - 20 mg/dL Final     CREATININE   Date Value Ref Range Status   06/22/2021 7.9 (H) 0.5 - 1.0 mg/dL Final   06/21/2021 7.9 (H) 0.5 - 1.0 mg/dL Final   05/20/2021 7.5 (H) 0.5 - 1.0 mg/dL Final     Glucose   Date Value Ref Range Status   06/22/2021 739 (HH) 74 - 99 mg/dL Final   06/21/2021 874 (HH) 74 - 99 mg/dL Final   05/20/2021 216 (H) 74 - 99 mg/dL Final   05/18/2012 130 (H) 70 - 110 mg/dL Final   04/26/2012 61 (L) 70 - 110 mg/dL Final   04/25/2012 196 (H) 70 - 110 mg/dL Final     Calcium   Date Value Ref Range Status   06/22/2021 7.9 (L) 8.6 - 10.2 mg/dL Final   06/21/2021 8.7 8.6 - 10.2 mg/dL Final   05/20/2021 9.1 8.6 - 10.2 mg/dL Final     Total Protein   Date Value Ref Range Status   06/21/2021 6.6 6.4 - 8.3 g/dL Final   05/14/2021 4.1 (L) 6.4 - 8.3 g/dL Final   02/27/2021 6.0 (L) 6.4 - 8.3 g/dL Final     Albumin   Date Value Ref Range Status   06/21/2021 2.9 (L) 3.5 - 5.2 g/dL Final   05/20/2021 3.9 3.5 - 5.2 g/dL Final   05/19/2021 3.8 3.5 - 5.2 g/dL Final   05/18/2012 4.1 3.2 - 4.8 g/dL Final   04/23/2012 4.0 3.2 - 4.8 g/dL Final   04/02/2012 4.8 3.2 - 4.8 g/dL Final     Total Bilirubin   Date Value Ref Range Status   06/21/2021 0.3 0.0 - 1.2 mg/dL Final   05/14/2021 <0.2 0.0 - 1.2 mg/dL Final   02/27/2021 <0.2 0.0 - 1.2 mg/dL Final     Alkaline Phosphatase   Date Value Ref Range Status   06/21/2021 222 (H) 35 - 104 U/L Final   05/14/2021 115 (H) 35 - 104 U/L Final   02/27/2021 221 (H) 35 - 104 U/L Final     AST   Date Value Ref Range Status   06/21/2021 26 0 - 31 U/L Final     Comment:     Specimen is moderately Hemolyzed. Result may be artificially increased. 05/14/2021 5 0 - 31 U/L Final   02/27/2021 22 0 - 31 U/L Final     ALT   Date Value Ref Range Status   06/21/2021 23 0 - 32 U/L Final   05/14/2021 6 0 - 32 U/L Final   02/27/2021 7 0 - 32 U/L Final     GFR Non-   Date Value Ref Range Status   06/22/2021 7 >=60 mL/min/1.73 Final     Comment:     Chronic Kidney Disease: less than 60 ml/min/1.73 sq.m. Kidney Failure: less than 15 ml/min/1.73 sq.m. Results valid for patients 18 years and older. 06/21/2021 7 >=60 mL/min/1.73 Final     Comment:     Chronic Kidney Disease: less than 60 ml/min/1.73 sq.m. Kidney Failure: less than 15 ml/min/1.73 sq.m. Results valid for patients 18 years and older. 05/20/2021 8 >=60 mL/min/1.73 Final     Comment:     Chronic Kidney Disease: less than 60 ml/min/1.73 sq.m. Kidney Failure: less than 15 ml/min/1.73 sq.m. Results valid for patients 18 years and older. GFR    Date Value Ref Range Status   06/22/2021 7  Final   06/21/2021 7  Final   05/20/2021 8  Final     Magnesium   Date Value Ref Range Status   05/16/2021 2.1 1.6 - 2.6 mg/dL Final   05/15/2021 2.4 1.6 - 2.6 mg/dL Final   05/14/2021 1.2 (L) 1.6 - 2.6 mg/dL Final     Phosphorus   Date Value Ref Range Status   05/20/2021 4.3 2.5 - 4.5 mg/dL Final   05/19/2021 4.0 2.5 - 4.5 mg/dL Final   05/18/2021 3.4 2.5 - 4.5 mg/dL Final     No results for input(s): PH, PO2, PCO2, HCO3, BE, O2SAT in the last 72 hours. RADIOLOGY:  XR CHEST PORTABLE   Final Result   No acute process. PROBLEM LIST:  Active Problems:    DKA, type 1, not at goal [facility])    Hyperosmolality  Resolved Problems:    * No resolved hospital problems. *      ATTESTATION:  ICU Staff Physician note of personal involvement in Care  As the attending physician, I certify that I personally reviewed the patients history and personnally examined the patient to confirm the physical findings described above,  And that I reviewed the relevant imaging studies and available reports. I also discussed the differential diagnosis and all of the proposed management plans with the patient and individuals accompanying the patient to this visit. They had the opportunity to ask questions about the proposed management plans and to have those questions answered. This patient has a high probability of sudden, clinically significant deterioration, which requires the highest level of physician preparedness to intervene urgently. I managed/supervised life or organ supporting interventions that required frequent physician assessment.    I devoted my full attention to the direct care of this patient for the amount of time indicated below.  Time I spent with the family or surrogate(s) is included only if the patient was incapable of providing the necessary information or participating in medical decisions  Time devoted to teaching and to any procedures I billed separately is not included.      CRITICAL CARE TIME:  30 minutes    Shauna Severs MD  Pulmonary and Critical Care Medicine

## 2021-06-22 NOTE — ED NOTES
Unable to obtain a line on patient. Several RNs have attempted. Doctors made aware.       Troy Barnes, RN  06/22/21 9474

## 2021-06-22 NOTE — ED PROVIDER NOTES
Appearance: Normal appearance. She is normal weight. HENT:      Head: Normocephalic and atraumatic. Eyes:      General: No scleral icterus. Conjunctiva/sclera: Conjunctivae normal.   Cardiovascular:      Rate and Rhythm: Normal rate and regular rhythm. Pulses: Normal pulses. Heart sounds: Normal heart sounds. No murmur heard. No gallop. Pulmonary:      Effort: Pulmonary effort is normal. No respiratory distress. Breath sounds: Normal breath sounds. No wheezing or rales. Abdominal:      General: Abdomen is flat. Bowel sounds are normal. There is no distension. Palpations: Abdomen is soft. Tenderness: There is no abdominal tenderness. There is no guarding. Comments: Patient noted to have peritoneal dialysis tubing   Musculoskeletal:         General: No swelling, tenderness or deformity. Skin:     General: Skin is warm and dry. Capillary Refill: Capillary refill takes less than 2 seconds. Coloration: Skin is not jaundiced. Neurological:      General: No focal deficit present. Mental Status: She is alert and oriented to person, place, and time. Psychiatric:         Mood and Affect: Mood normal.         Thought Content: Thought content normal.          Procedures     PROCEDURE  6/22/21       Time: 0200    CENTRAL LINE INSERTION  Risks, benefits and alternatives (for applicable procedures below) described. Performed By: Lawrence Taylor MD.    Indication: inability to gain peripheral access. Informed consent: Written consent obtained. The patient was counseled regarding the procedure in person, it's indications, risks, potential complications and alternatives and any questions were answered. Consent was obtained. .  Procedure: After routine sterile preparation, local anesthesia obtained by infiltration using 1% Lidocaine without epinephrine. A right 3-Lumen 7F Central Venous Catheter was placed by femoral vein approach and secured by standard fashion. Ultrasound Guidance:   used. Number of Attempts: 2  Post-procedure Findings: A post procedural chest x-ray  was not indicated. Patient tolerated the procedure well. MDM   37-year female presents emerged department complaint of generalized arthralgias, fatigue, nausea. Patient does have past medical history of diabetes and CKD on peritoneal dialysis. Patient was noted on thorough chart review to have multiple admissions for diabetic ketoacidosis in the past.  Patient's lab work here in the emergency department initially showed a hyperglycemia on the blood glucose monitor greater than 500. Following blood work did show the patient to be in diabetic ketoacidosis with a blood sugar greater than 800, as well as a slight hyperkalemia as well. Patient's urinalysis was noted for urinary tract infection based on her many bacteria many white count as well as her dysuria. .  IV access was unable to be obtained by nursing staff and a right femoral central line was placed for access and administration of medication. Patient was started on insulin drip for her diabetic ketoacidosis. Due to the patient being diabetic ketoacidosis patient will be admitted to the intensive care unit at this time.   ED Course as of Jun 22 0303 Tue Jun 22, 2021 0301 Patient accepted into the ICU by nurse practitioner Oksana Garvey    [SUSY]      ED Course User Index  [CB] Valentino Shan, MD      ED Course as of Jun 22 0303 Tue Jun 22, 2021 0301 Patient accepted into the ICU by nurse practitioner Oksana Garvey    [CB]      ED Course User Index  [CB] Valentino Shan, MD       --------------------------------------------- PAST HISTORY ---------------------------------------------  Past Medical History:  has a past medical history of Acute congestive heart failure ([facility] [address] BC (acute kidney injury) ([facility] [address] Cardiac arrest ([facility] [address] Cephalgia, Chronic kidney disease, Depression, Diabetes mellitus ([facility] [address] Diabetic gastroparesis associated with type 1 diabetes mellitus ([facility] [address] Diabetic ketoacidosis ([facility] [address] Diabetic ketoacidosis with coma associated with type 1 diabetes mellitus ([address] Diabetic polyneuropathy associated with type 1 diabetes mellitus ([address] Drug use complicating pregnancy in third trimester, Endocarditis, ESRD (end stage renal disease) ([facility] [address] Hemodialysis patient ([facility] [address] History of blood transfusion, Hyperlipidemia, Hyperosmolar hyperglycemic state (HHS) ([address] Hypothyroidism, Iron deficiency anemia, MDRO (multiple drug resistant organisms) resistance, MRSA (methicillin resistant Staphylococcus aureus), Non compliance w medication regimen, Other disorders of kidney and ureter, Pregnancy, Previous  delivery affecting pregnancy, antepartum, Previous stillbirth or demise, antepartum, Seizure ([facility] [address] Severe pre-eclampsia in third trimester, Shock liver, and Valvular endocarditis. Past Surgical History:  has a past surgical history that includes ECHO Compl W Dop Color Flow (2012); ECHO Compl W Dop Color Flow (6/10/2013);  section; Tunneled venous port placement (2018); pr esophagogastroduodenoscopy transoral diagnostic (N/A, 2018); back surgery; Colonoscopy (N/A, 2018); Colonoscopy (N/A, 2018); Upper gastrointestinal endoscopy (2018); Upper gastrointestinal endoscopy (N/A, 10/11/2019); Cholecystectomy, laparoscopic (N/A, 2019); LAPAROSCOPY INSERTION PERITONEAL CATHETER (N/A, 2020); transesophageal echocardiogram (N/A, 10/19/2020); embolectomy (N/A, 2020); Foot Debridement (Left, 2021); and Foot Debridement (Left, 2021). Social History:  reports that she quit smoking about 4 months ago. Her smoking use included cigarettes. She has a 3.00 pack-year smoking history. She has never used smokeless tobacco. She reports that she does not drink alcohol and does not use drugs.     Family History: family history includes Asthma in her brother and mother; Diabetes in her mother; High Blood Pressure in her father and mother; Hypertension in her mother. The patients home medications have been reviewed.     Allergies: Cefepime and Toradol [ketorolac tromethamine]    -------------------------------------------------- RESULTS -------------------------------------------------    LABS:  Results for orders placed or performed during the hospital encounter of 06/21/21   CBC Auto Differential   Result Value Ref Range    WBC 11.4 4.5 - 11.5 E9/L    RBC 3.16 (L) 3.50 - 5.50 E12/L    Hemoglobin 9.6 (L) 11.5 - 15.5 g/dL    Hematocrit 31.7 (L) 34.0 - 48.0 %    .3 (H) 80.0 - 99.9 fL    MCH 30.4 26.0 - 35.0 pg    MCHC 30.3 (L) 32.0 - 34.5 %    RDW 14.2 11.5 - 15.0 fL    Platelets 946 545 - 504 E9/L    MPV 10.0 7.0 - 12.0 fL    Neutrophils % 75.0 43.0 - 80.0 %    Immature Granulocytes % 0.6 0.0 - 5.0 %    Lymphocytes % 19.7 (L) 20.0 - 42.0 %    Monocytes % 3.1 2.0 - 12.0 %    Eosinophils % 0.8 0.0 - 6.0 %    Basophils % 0.8 0.0 - 2.0 %    Neutrophils Absolute 8.52 (H) 1.80 - 7.30 E9/L    Immature Granulocytes # 0.07 E9/L    Lymphocytes Absolute 2.24 1.50 - 4.00 E9/L    Monocytes Absolute 0.35 0.10 - 0.95 E9/L    Eosinophils Absolute 0.09 0.05 - 0.50 E9/L    Basophils Absolute 0.09 0.00 - 0.20 E9/L   Comprehensive Metabolic Panel w/ Reflex to MG   Result Value Ref Range    Sodium 122 (L) 132 - 146 mmol/L    Potassium reflex Magnesium 5.6 (H) 3.5 - 5.0 mmol/L    Chloride 83 (L) 98 - 107 mmol/L    CO2 19 (L) 22 - 29 mmol/L    Anion Gap 20 (H) 7 - 16 mmol/L    Glucose 874 (HH) 74 - 99 mg/dL    BUN 38 (H) 6 - 20 mg/dL    CREATININE 7.9 (H) 0.5 - 1.0 mg/dL    GFR Non-African American 7 >=60 mL/min/1.73    GFR African American 7     Calcium 8.7 8.6 - 10.2 mg/dL    Total Protein 6.6 6.4 - 8.3 g/dL    Albumin 2.9 (L) 3.5 - 5.2 g/dL    Total Bilirubin 0.3 0.0 - 1.2 mg/dL    Alkaline Phosphatase 222 (H) 35 - 104 U/L    ALT 23 0 - 32 U/L    AST 26 0 - 31 U/L   PH, VENOUS   Result Value Ref Range    pH, Khurram 7.45 7.35 - 7.45   Beta-Hydroxybutyrate   Result Value Ref Range    Beta-Hydroxybutyrate 3.94 (H) 0.02 - 0.27 mmol/L   Urinalysis, reflex to microscopic   Result Value Ref Range    Color, UA Yellow Straw/Yellow    Clarity, UA CLOUDY (A) Clear    Glucose, Ur >=1000 (A) Negative mg/dL    Bilirubin Urine Negative Negative    Ketones, Urine Negative Negative mg/dL    Specific Gravity, UA 1.010 1.005 - 1.030    Blood, Urine LARGE (A) Negative    pH, UA 7.5 5.0 - 9.0    Protein, UA >=300 (A) Negative mg/dL    Urobilinogen, Urine 0.2 <2.0 E.U./dL    Nitrite, Urine Negative Negative    Leukocyte Esterase, Urine Negative Negative   Troponin   Result Value Ref Range    Troponin, High Sensitivity 251 (H) 0 - 9 ng/L   Lactate, Sepsis   Result Value Ref Range    Lactic Acid, Sepsis 2.2 (H) 0.5 - 1.9 mmol/L   Microscopic Urinalysis   Result Value Ref Range    WBC, UA PACKED (A) 0 - 5 /HPF    RBC, UA 1-3 0 - 2 /HPF    Bacteria, UA MANY (A) None Seen /HPF   POCT Glucose   Result Value Ref Range    Meter Glucose >500 (H) 74 - 99 mg/dL   POC Pregnancy Urine   Result Value Ref Range    HCG, Urine, POC Negative Negative    Lot Number [id number]     Positive QC Pass/Fail Pass     Negative QC Pass/Fail Pass    POCT Glucose   Result Value Ref Range    Meter Glucose >500 (H) 74 - 99 mg/dL   EKG 12 Lead   Result Value Ref Range    Ventricular Rate 109 BPM    Atrial Rate 109 BPM    P-R Interval 114 ms    QRS Duration 90 ms    Q-T Interval 366 ms    QTc Calculation (Bazett) 492 ms    P Axis 58 degrees    R Axis 47 degrees    T Axis 49 degrees       RADIOLOGY:  XR CHEST PORTABLE   Final Result   No acute process. EKG:  This EKG is signed and interpreted by me.     Rate: 109  Rhythm: Sinus tachy  Interpretation: No acute ST elevation depression normal sinus rhythm, normal axis  Comparison: stable as compared to patient's most recent EKG      ------------------------- NURSING NOTES AND VITALS REVIEWED ---------------------------  Date / Time Roomed: 6/21/2021 10:37 PM  ED Bed Assignment:  05/05    The nursing notes within the ED encounter and vital signs as below have been reviewed. Patient Vitals for the past 24 hrs:   BP Temp Temp src Pulse Resp SpO2 Height Weight   06/21/21 2110 (!) 97/58 97.9 °F (36.6 °C) Oral 110 20 98 % 5' 4\" (1.626 m) 138 lb (62.6 kg)       Oxygen Saturation Interpretation: Normal    ------------------------------------------ PROGRESS NOTES ------------------------------------------  Re-evaluation(s):  Time: 0200  Patients symptoms show no change  Repeat physical examination is not changed    Counseling:  I have spoken with the patient and discussed todays results, in addition to providing specific details for the plan of care and counseling regarding the diagnosis and prognosis. Their questions are answered at this time and they are agreeable with the plan of admission.    --------------------------------- ADDITIONAL PROVIDER NOTES ---------------------------------  Consultations:  Spoke with Dr. Opal Hernandez .  Discussed case. They will admit the patient. This patient's ED course included: a personal history and physicial examination, re-evaluation prior to disposition, multiple bedside re-evaluations, IV medications, cardiac monitoring and continuous pulse oximetry    This patient has remained hemodynamically stable during their ED course. Diagnosis:  1. Diabetic ketoacidosis without coma associated with other specified diabetes mellitus ([facility] [address]    2. Chronic kidney disease, unspecified CKD stage    3. Hyperkalemia    4. Urinary tract infection without hematuria, site unspecified        Disposition:  Patient's disposition: Admit to CCU/ICU  Patient's condition is stable.     The patient was seen and evaluated by myself and Dr. Marie Lazaro MD PGY-1  6/22/2021 2:39 AM       Dayday Reeder MD  Resident  06/22/21 [id number]    ATTENDING PROVIDER ATTESTATION:     I have personally performed and/or participated in the history, exam, medical decision making, and procedures and agree with all pertinent clinical information. I have also reviewed and agree with the past medical, family and social history unless otherwise noted. I have discussed this patient in detail with the resident, and provided the instruction and education regarding diabetes ketoacidosis, chronic renal failure and central venous catheter insertion. My findings/Plan: Tachycardic. Lungs CTA bilaterally. Abdomen soft, nontender. Bowel sounds normal. Supportive care. Central line placement attempted in right IJ, however, the wire would not feed. Central line successfully inserted into right femoral vein without complication.             [address],   07/05/21 7488

## 2021-06-22 NOTE — PROGRESS NOTES
Physical Therapy Initial Evaluation/Plan of Care    Room #:  IC06/IC06-01  Patient Name: Jamal Bower  YOB: 1990  MRN: [id number]    Date of Service: 6/22/2021     Tentative placement recommendation: Home    Equipment recommendation: Patient has needed equipment       Evaluating Physical Therapist: Sarah Parisi Student Physical Therapist    Specific Provider Orders/Date/Referring Provider :  06/22/21 1030   PT eval and treat Start: 06/22/21 1030, End: 06/22/21 1030, ONE TIME, Standing Count: 1 Occurrences, Delfino Martinez MD    Admitting Diagnosis:   DKA, type 1, not at goal [facility]) [E10.10]  Hyperosmolality [E87.0]      Visit Diagnoses       Codes    Diabetic ketoacidosis without coma associated with other specified diabetes mellitus ([address]    -  Primary E13.10    Chronic kidney disease, unspecified CKD stage     N18.9    Urinary tract infection without hematuria, site unspecified     N39.0        Surgery: none    Patient Active Problem List   Diagnosis    Non compliance w medication regimen    Tobacco smoking complicating pregnancy    MTHFR mutation    Hypertension    Iron deficiency anemia    Sinus tachycardia    Bladder dysfunction    Hypokalemia    Severe protein-calorie malnutrition ([address]    Uncontrolled type 1 diabetes mellitus with hyperglycemia, with long-term current use of insulin ([address]    ESRD on peritoneal dialysis ([address]    Hypothyroidism    Hyperlipidemia    Acute cystitis    Nondisplaced fracture of neck of fifth metacarpal bone, left hand, initial encounter for closed fracture    Chronic right-sided low back pain    Endocarditis    Seizure ([address]    Hyperkalemia    Hypomagnesemia    Hypocalcemia    Intractable nausea and vomiting    Wound of left leg, sequela    Syncope    DKA, type 1, not at goal [facility])    Hyperosmolality    Major depressive disorder      ASSESSMENT of Current Deficits Patient exhibits decreased strength and pain 7/10 globally impairing tolerance to activity. Patient was willing to work with therapy but was in a lot of pain. Patient transferred with supervision to chair with supervision but was not assessed any further due to pain. Patient requires continued skilled physical therapy to address concerns listed above for increased safety and function at discharge. PHYSICAL THERAPY  PLAN OF CARE     Physical therapy plan of care is established based on physician order,  patient diagnosis and clinical assessment    Current Treatment Recommendations:    -Gait: Gait training   -Endurance: Utilize Supervised activities to increase level of endurance to allow for safe functional mobility including transfers and gait   -Stairs: Stair training with instruction on proper technique and hand placement on rail    PT long term treatment goals are located in below grid    Patient and or family understand(s) diagnosis, prognosis, and plan of care. Frequency of treatments: Patient will be seen  2-3 times/week.        Prior Level of Function: Patient ambulated independently with wheelchair for distance  Rehab Potential: good    for baseline    Past medical history:   Past Medical History:   Diagnosis Date    Acute congestive heart failure ([address]     BC (acute kidney injury) ([address] 10/1/2019    Cardiac arrest ([address] 2/15/2021    Cephalgia 10/9/2019    Chronic kidney disease     Depression     Diabetes mellitus ([address]     Diabetic gastroparesis associated with type 1 diabetes mellitus ([address] 12/17/2018    Diabetic ketoacidosis ([address] 8/27/2011    Diabetic ketoacidosis with coma associated with type 1 diabetes mellitus ([address] 6/26/2013    Diabetic polyneuropathy associated with type 1 diabetes mellitus ([address] 12/27/2020    Drug use complicating pregnancy in third trimester     Endocarditis 10/31/2020    ESRD (end stage renal disease) ([address] 9/29/2020    Hemodialysis patient ([address]     History of blood transfusion 11/2019    Hyperlipidemia EGD ESOPHAGOGASTRODUODENOSCOPY performed by Peri Jung MD at [address] [facility] TRANSESOPHAGEAL ECHOCARDIOGRAM N/A 10/19/2020    TRANSESOPHAGEAL ECHOCARDIOGRAM WITH BUBBLE STUDY performed by Jacquie Boyd MD at [address] [facility] TUNNELED VENOUS PORT PLACEMENT  04/2018    UPPER GASTROINTESTINAL ENDOSCOPY  12/18/2018    EGD BIOPSY performed by Faisal Mejia MD at [address] [facility] N/A 10/11/2019    EGD ESOPHAGOGASTRODUODENOSCOPY performed by Danita Stephenson DO at [address]:  Precautions: Up as tolerated, falls and alarm ,    Social history: Patient lives with children  in a apartment 2nd floor  with 6 steps  to enter with Rail  Tub shower     Equipment owned: Wheelchair and [address] chair,      [email or web address]   How much difficulty turning over in bed?: A Little  How much difficulty sitting down on / standing up from a chair with arms?: A Little  How much difficulty moving from lying on back to sitting on side of bed?: A Little  How much help from another person moving to and from a bed to a chair?: A Little  How much help from another person needed to walk in hospital room?: A Little  How much help from another person for climbing 3-5 steps with a railing?: A Little  AM-PAC Inpatient Mobility Raw Score : 18  AM-PAC Inpatient T-Scale Score : 43.63  Mobility Inpatient CMS 0-100% Score: 46.58  Mobility Inpatient CMS G-Code Modifier : CK    Nursing cleared patient for PT evaluation. The admitting diagnosis and active problem list as listed above have been reviewed prior to the initiation of this evaluation. OBJECTIVE;   Initial Evaluation  Date: 6/22/2021 Treatment Date:     Short Term/ Long Term   Goals   Was pt agreeable to Eval/treatment?  Yes    To be met in 3 days   Pain level   7/10  global     Bed Mobility    Rolling: Supervision     Supine to sit: Supervision     Sit to supine: Not assessed     Scooting: Supervision     Rolling: Independent     Supine to sit: Independent    Sit to supine: Independent    Scooting: Independent     Transfers Sit to stand: Supervision  with therapist for line management    Sit to stand: Independent     Ambulation    3-4 steps using  hand held assist with Minimal assist of 1       50 feet using  no device with Independent    Stair negotiation: ascended and descended   Not assessed       6 steps with railing to simulate home environment   ROM Within functional limits         Strength BUE:  refer to OT eval  RLE:  3+/5  LLE:  3+/5   Increase strength in affected mm groups by 1/3 grade   Balance Sitting EOB:  good    Dynamic Standing:  fair    Sitting EOB:  good    Dynamic Standing: good       Patient is Alert & Oriented x person, place, time and situation and follows directions    Sensation:  Patient  denies numbness/tingling     Edema:  yes bilateral lower extremities    Endurance: fair       Vitals: room air    Blood Pressure at rest  111/69 Blood Pressure during session  103/82   Heart Rate at rest 81 Heart Rate during session 90   SPO2 at rest 98%  SPO2 during session 98%     Patient education  Patient educated on role of Physical Therapy, risks of immobility, safety and plan of care and  importance of mobility while in hospital       Patient response to education:   Pt verbalized understanding Pt demonstrated skill Pt requires further education in this area   Yes Partial Yes      Treatment:  Patient practiced and was instructed/facilitated in the following treatment: Patient   transferred from bed to chair with supervision        Therapeutic Exercises:  not performed      At end of session, patient in chair with   call light and phone within reach,   all lines and tubes intact, nursing notified. Patient would benefit from continued skilled Physical Therapy to improve functional independence and quality of life.        Patient's/ family goals   home        Time in  1141  Time out 1154    Total Treatment Time  0 minutes    Evaluation time includes thorough review of current medical information, gathering information on past medical history/social history and prior level of function, completion of standardized testing/informal observation of tasks, assessment of data, and development of Plan of care and goals.      CPT codes:  Low Complexity PT evaluation (73150)  No treatment    Ludivina Marquez, SPT

## 2021-06-23 LAB
ABO/RH: NORMAL
ALBUMIN SERPL-MCNC: 2.4 G/DL (ref 3.5–5.2)
ALP BLD-CCNC: 147 U/L (ref 35–104)
ALT SERPL-CCNC: 13 U/L (ref 0–32)
ANION GAP SERPL CALCULATED.3IONS-SCNC: 9 MMOL/L (ref 7–16)
ANTIBODY SCREEN: NORMAL
AST SERPL-CCNC: 16 U/L (ref 0–31)
BASOPHILS ABSOLUTE: 0.06 E9/L (ref 0–0.2)
BASOPHILS RELATIVE PERCENT: 1.1 % (ref 0–2)
BILIRUB SERPL-MCNC: <0.2 MG/DL (ref 0–1.2)
BLOOD BANK DISPENSE STATUS: NORMAL
BLOOD BANK PRODUCT CODE: NORMAL
BPU ID: NORMAL
BUN BLDV-MCNC: 37 MG/DL (ref 6–20)
CALCIUM SERPL-MCNC: 7.8 MG/DL (ref 8.6–10.2)
CHLORIDE BLD-SCNC: 96 MMOL/L (ref 98–107)
CO2: 26 MMOL/L (ref 22–29)
CREAT SERPL-MCNC: 8 MG/DL (ref 0.5–1)
DESCRIPTION BLOOD BANK: NORMAL
EOSINOPHILS ABSOLUTE: 0.19 E9/L (ref 0.05–0.5)
EOSINOPHILS RELATIVE PERCENT: 3.6 % (ref 0–6)
GFR AFRICAN AMERICAN: 7
GFR NON-AFRICAN AMERICAN: 7 ML/MIN/1.73
GLUCOSE BLD-MCNC: 260 MG/DL (ref 74–99)
HCT VFR BLD CALC: 19.5 % (ref 34–48)
HCT VFR BLD CALC: 23.7 % (ref 34–48)
HEMOGLOBIN: 6.4 G/DL (ref 11.5–15.5)
HEMOGLOBIN: 7.7 G/DL (ref 11.5–15.5)
IMMATURE GRANULOCYTES #: 0.04 E9/L
IMMATURE GRANULOCYTES %: 0.7 % (ref 0–5)
LYMPHOCYTES ABSOLUTE: 2.01 E9/L (ref 1.5–4)
LYMPHOCYTES RELATIVE PERCENT: 37.6 % (ref 20–42)
MAGNESIUM: 2.7 MG/DL (ref 1.6–2.6)
MCH RBC QN AUTO: 30.6 PG (ref 26–35)
MCHC RBC AUTO-ENTMCNC: 32.8 % (ref 32–34.5)
MCV RBC AUTO: 93.3 FL (ref 80–99.9)
METER GLUCOSE: 133 MG/DL (ref 74–99)
METER GLUCOSE: 180 MG/DL (ref 74–99)
METER GLUCOSE: 188 MG/DL (ref 74–99)
METER GLUCOSE: 237 MG/DL (ref 74–99)
MONOCYTES ABSOLUTE: 0.27 E9/L (ref 0.1–0.95)
MONOCYTES RELATIVE PERCENT: 5.1 % (ref 2–12)
NEUTROPHILS ABSOLUTE: 2.77 E9/L (ref 1.8–7.3)
NEUTROPHILS RELATIVE PERCENT: 51.9 % (ref 43–80)
PDW BLD-RTO: 14.1 FL (ref 11.5–15)
PHOSPHORUS: 3.6 MG/DL (ref 2.5–4.5)
PLATELET # BLD: 197 E9/L (ref 130–450)
PMV BLD AUTO: 10 FL (ref 7–12)
POTASSIUM SERPL-SCNC: 4.3 MMOL/L (ref 3.5–5)
RBC # BLD: 2.09 E12/L (ref 3.5–5.5)
REASON FOR REJECTION: NORMAL
REJECTED TEST: NORMAL
SODIUM BLD-SCNC: 131 MMOL/L (ref 132–146)
TOTAL PROTEIN: 4.7 G/DL (ref 6.4–8.3)
WBC # BLD: 5.3 E9/L (ref 4.5–11.5)

## 2021-06-23 PROCEDURE — 36556 INSERT NON-TUNNEL CV CATH: CPT

## 2021-06-23 PROCEDURE — 97165 OT EVAL LOW COMPLEX 30 MIN: CPT

## 2021-06-23 PROCEDURE — 85025 COMPLETE CBC W/AUTO DIFF WBC: CPT

## 2021-06-23 PROCEDURE — 6370000000 HC RX 637 (ALT 250 FOR IP): Performed by: INTERNAL MEDICINE

## 2021-06-23 PROCEDURE — 6360000002 HC RX W HCPCS: Performed by: INTERNAL MEDICINE

## 2021-06-23 PROCEDURE — 80053 COMPREHEN METABOLIC PANEL: CPT

## 2021-06-23 PROCEDURE — 2580000003 HC RX 258: Performed by: INTERNAL MEDICINE

## 2021-06-23 PROCEDURE — 85014 HEMATOCRIT: CPT

## 2021-06-23 PROCEDURE — 84100 ASSAY OF PHOSPHORUS: CPT

## 2021-06-23 PROCEDURE — 86901 BLOOD TYPING SEROLOGIC RH(D): CPT

## 2021-06-23 PROCEDURE — 99232 SBSQ HOSP IP/OBS MODERATE 35: CPT | Performed by: INTERNAL MEDICINE

## 2021-06-23 PROCEDURE — 2500000003 HC RX 250 WO HCPCS: Performed by: INTERNAL MEDICINE

## 2021-06-23 PROCEDURE — 85018 HEMOGLOBIN: CPT

## 2021-06-23 PROCEDURE — 36592 COLLECT BLOOD FROM PICC: CPT

## 2021-06-23 PROCEDURE — 82962 GLUCOSE BLOOD TEST: CPT

## 2021-06-23 PROCEDURE — P9016 RBC LEUKOCYTES REDUCED: HCPCS

## 2021-06-23 PROCEDURE — 83735 ASSAY OF MAGNESIUM: CPT

## 2021-06-23 PROCEDURE — 1200000000 HC SEMI PRIVATE

## 2021-06-23 PROCEDURE — 86900 BLOOD TYPING SEROLOGIC ABO: CPT

## 2021-06-23 PROCEDURE — 86850 RBC ANTIBODY SCREEN: CPT

## 2021-06-23 PROCEDURE — P9047 ALBUMIN (HUMAN), 25%, 50ML: HCPCS | Performed by: INTERNAL MEDICINE

## 2021-06-23 PROCEDURE — 86923 COMPATIBILITY TEST ELECTRIC: CPT

## 2021-06-23 PROCEDURE — 36415 COLL VENOUS BLD VENIPUNCTURE: CPT

## 2021-06-23 PROCEDURE — 6360000002 HC RX W HCPCS: Performed by: NURSE PRACTITIONER

## 2021-06-23 PROCEDURE — 36430 TRANSFUSION BLD/BLD COMPNT: CPT

## 2021-06-23 RX ORDER — SODIUM CHLORIDE 9 MG/ML
250 INJECTION, SOLUTION INTRAVENOUS PRN
Status: DISCONTINUED | OUTPATIENT
Start: 2021-06-23 | End: 2021-07-02 | Stop reason: HOSPADM

## 2021-06-23 RX ORDER — FENTANYL CITRATE 50 UG/ML
25 INJECTION, SOLUTION INTRAMUSCULAR; INTRAVENOUS
Status: DISCONTINUED | OUTPATIENT
Start: 2021-06-23 | End: 2021-06-24

## 2021-06-23 RX ORDER — ALBUMIN (HUMAN) 12.5 G/50ML
25 SOLUTION INTRAVENOUS EVERY 6 HOURS
Status: COMPLETED | OUTPATIENT
Start: 2021-06-23 | End: 2021-06-25

## 2021-06-23 RX ADMIN — ALBUMIN (HUMAN) 25 G: 0.25 INJECTION, SOLUTION INTRAVENOUS at 13:43

## 2021-06-23 RX ADMIN — Medication 10 ML: at 21:30

## 2021-06-23 RX ADMIN — INSULIN LISPRO 4 UNITS: 100 INJECTION, SOLUTION INTRAVENOUS; SUBCUTANEOUS at 08:49

## 2021-06-23 RX ADMIN — FENTANYL CITRATE 25 MCG: 50 INJECTION INTRAMUSCULAR; INTRAVENOUS at 16:30

## 2021-06-23 RX ADMIN — Medication 10 ML: at 10:36

## 2021-06-23 RX ADMIN — INSULIN LISPRO 1 UNITS: 100 INJECTION, SOLUTION INTRAVENOUS; SUBCUTANEOUS at 22:04

## 2021-06-23 RX ADMIN — LEVOTHYROXINE SODIUM 75 MCG: 75 TABLET ORAL at 05:57

## 2021-06-23 RX ADMIN — SEVELAMER CARBONATE 800 MG: 800 TABLET, FILM COATED ORAL at 16:36

## 2021-06-23 RX ADMIN — HYDRALAZINE HYDROCHLORIDE 10 MG: 10 TABLET, FILM COATED ORAL at 22:01

## 2021-06-23 RX ADMIN — PANTOPRAZOLE SODIUM 40 MG: 40 TABLET, DELAYED RELEASE ORAL at 05:57

## 2021-06-23 RX ADMIN — SODIUM CHLORIDE: 9 INJECTION, SOLUTION INTRAVENOUS at 03:00

## 2021-06-23 RX ADMIN — SEVELAMER CARBONATE 800 MG: 800 TABLET, FILM COATED ORAL at 12:42

## 2021-06-23 RX ADMIN — SODIUM CHLORIDE, PRESERVATIVE FREE 10 ML: 5 INJECTION INTRAVENOUS at 16:25

## 2021-06-23 RX ADMIN — MIRTAZAPINE 15 MG: 15 TABLET, FILM COATED ORAL at 22:01

## 2021-06-23 RX ADMIN — FENTANYL CITRATE 25 MCG: 50 INJECTION INTRAMUSCULAR; INTRAVENOUS at 23:28

## 2021-06-23 RX ADMIN — OXYCODONE AND ACETAMINOPHEN 1 TABLET: 5; 325 TABLET ORAL at 22:01

## 2021-06-23 RX ADMIN — INSULIN GLARGINE 7 UNITS: 100 INJECTION, SOLUTION SUBCUTANEOUS at 08:49

## 2021-06-23 RX ADMIN — METOCLOPRAMIDE HYDROCHLORIDE 5 MG: 5 TABLET ORAL at 22:01

## 2021-06-23 RX ADMIN — ROPINIROLE HYDROCHLORIDE 0.25 MG: 0.25 TABLET, FILM COATED ORAL at 22:02

## 2021-06-23 RX ADMIN — INSULIN LISPRO 2 UNITS: 100 INJECTION, SOLUTION INTRAVENOUS; SUBCUTANEOUS at 12:25

## 2021-06-23 RX ADMIN — HEPARIN SODIUM 5000 UNITS: 5000 INJECTION INTRAVENOUS; SUBCUTANEOUS at 05:59

## 2021-06-23 RX ADMIN — PIPERACILLIN SODIUM AND TAZOBACTAM SODIUM 3375 MG: 3; .375 INJECTION, POWDER, LYOPHILIZED, FOR SOLUTION INTRAVENOUS at 13:52

## 2021-06-23 RX ADMIN — METOCLOPRAMIDE HYDROCHLORIDE 5 MG: 5 TABLET ORAL at 08:47

## 2021-06-23 RX ADMIN — METOCLOPRAMIDE HYDROCHLORIDE 5 MG: 5 TABLET ORAL at 12:46

## 2021-06-23 RX ADMIN — PROMETHAZINE HYDROCHLORIDE 12.5 MG: 25 TABLET ORAL at 12:46

## 2021-06-23 RX ADMIN — ALBUMIN (HUMAN) 25 G: 0.25 INJECTION, SOLUTION INTRAVENOUS at 16:43

## 2021-06-23 RX ADMIN — SODIUM CHLORIDE 25 ML: 9 INJECTION, SOLUTION INTRAVENOUS at 16:33

## 2021-06-23 RX ADMIN — FOLIC ACID 1 MG: 1 TABLET ORAL at 08:47

## 2021-06-23 RX ADMIN — SODIUM CHLORIDE: 9 INJECTION, SOLUTION INTRAVENOUS at 16:35

## 2021-06-23 RX ADMIN — ARIPIPRAZOLE 5 MG: 5 TABLET ORAL at 22:01

## 2021-06-23 RX ADMIN — PIPERACILLIN SODIUM AND TAZOBACTAM SODIUM 3375 MG: 3; .375 INJECTION, POWDER, LYOPHILIZED, FOR SOLUTION INTRAVENOUS at 23:33

## 2021-06-23 RX ADMIN — HYDROXYZINE HYDROCHLORIDE 25 MG: 25 TABLET ORAL at 08:47

## 2021-06-23 RX ADMIN — HYDRALAZINE HYDROCHLORIDE 10 MG: 10 TABLET, FILM COATED ORAL at 08:47

## 2021-06-23 RX ADMIN — FAMOTIDINE 10 MG: 10 INJECTION INTRAVENOUS at 16:24

## 2021-06-23 RX ADMIN — HEPARIN SODIUM 5000 UNITS: 5000 INJECTION INTRAVENOUS; SUBCUTANEOUS at 13:56

## 2021-06-23 RX ADMIN — HEPARIN SODIUM 5000 UNITS: 5000 INJECTION INTRAVENOUS; SUBCUTANEOUS at 22:04

## 2021-06-23 RX ADMIN — METOPROLOL TARTRATE 25 MG: 25 TABLET, FILM COATED ORAL at 08:48

## 2021-06-23 RX ADMIN — OXYCODONE AND ACETAMINOPHEN 1 TABLET: 5; 325 TABLET ORAL at 09:31

## 2021-06-23 RX ADMIN — ALBUMIN (HUMAN) 25 G: 0.25 INJECTION, SOLUTION INTRAVENOUS at 23:29

## 2021-06-23 RX ADMIN — SEVELAMER CARBONATE 800 MG: 800 TABLET, FILM COATED ORAL at 08:47

## 2021-06-23 ASSESSMENT — PAIN DESCRIPTION - DESCRIPTORS
DESCRIPTORS: ACHING;DISCOMFORT
DESCRIPTORS: ACHING;DISCOMFORT

## 2021-06-23 ASSESSMENT — PAIN SCALES - GENERAL
PAINLEVEL_OUTOF10: 6
PAINLEVEL_OUTOF10: 8
PAINLEVEL_OUTOF10: 8
PAINLEVEL_OUTOF10: 6
PAINLEVEL_OUTOF10: 8
PAINLEVEL_OUTOF10: 7

## 2021-06-23 ASSESSMENT — PAIN DESCRIPTION - LOCATION
LOCATION: ABDOMEN

## 2021-06-23 ASSESSMENT — PAIN DESCRIPTION - PAIN TYPE
TYPE: CHRONIC PAIN

## 2021-06-23 ASSESSMENT — PAIN DESCRIPTION - FREQUENCY
FREQUENCY: CONTINUOUS
FREQUENCY: CONTINUOUS

## 2021-06-23 ASSESSMENT — PAIN DESCRIPTION - ORIENTATION: ORIENTATION: MID;LOWER;UPPER

## 2021-06-23 NOTE — PROGRESS NOTES
Department of Internal Medicine  Nephrology Attending Progress Note      Reason for Consult:  End-Stage Renal Disease  Requesting Physician: Dr. 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 In the emergency room she was found to have a glucose level of 874, beta hydroxybutyrate of 3.94.   She was admitted to MICU.  6/24/21:  Patient alert feels weak, receiving PRBC    Current Medications:    Current Facility-Administered Medications: 0.9 % sodium chloride infusion, 250 mL, Intravenous, PRN  glucose (GLUTOSE) 40 % oral gel 15 g, 15 g, Oral, PRN  dextrose 50 % IV solution, 12.5 g, Intravenous, PRN  glucagon (rDNA) injection 1 mg, 1 mg, Intramuscular, PRN  dextrose 5 % solution, 100 mL/hr, Intravenous, PRN  insulin regular (HUMULIN R;NOVOLIN R) 100 Units in sodium chloride 0.9 % 100 mL infusion, 0.1 Units/kg/hr, Intravenous, Continuous  sodium chloride flush 0.9 % injection 10 mL, 10 mL, Intravenous, 2 times per day  sodium chloride flush 0.9 % injection 10 mL, 10 mL, Intravenous, PRN  0.9 % sodium chloride infusion, 25 mL, Intravenous, PRN  promethazine (PHENERGAN) tablet 12.5 mg, 12.5 mg, Oral, Q6H PRN **OR** ondansetron (ZOFRAN) injection 4 mg, 4 mg, Intravenous, Q6H PRN  acetaminophen (TYLENOL) tablet 650 mg, 650 mg, Oral, Q6H PRN **OR** acetaminophen (TYLENOL) suppository 650 mg, 650 mg, Rectal, Q6H PRN  0.9 % sodium chloride bolus, 15 mL/kg, Intravenous, Once **FOLLOWED BY** 0.9 % sodium chloride infusion, , Intravenous, Continuous  polyethylene glycol (GLYCOLAX) packet 17 g, 17 g, Oral, Daily PRN  heparin (porcine) injection 5,000 Units, 5,000 Units, Subcutaneous, 3 times per day  metoclopramide (REGLAN) tablet 5 mg, 5 mg, Oral, TID  rOPINIRole (REQUIP) tablet 0.25 mg, 0.25 mg, Oral, Nightly  mirtazapine (REMERON) tablet 15 mg, 15 mg, Oral, Nightly  hydrOXYzine (ATARAX) tablet 25 mg, 25 mg, Oral, Daily  albuterol (PROVENTIL) nebulizer solution 2.5 mg, 2.5 mg, Nebulization, Q6H PRN  ARIPiprazole (ABILIFY) tablet 5 mg, 5 mg, Oral, Nightly  sevelamer (RENVELA) tablet 800 mg, 800 mg, Oral, TID WC  hydrALAZINE (APRESOLINE) tablet 10 mg, 10 mg, Oral, BID  metoprolol tartrate (LOPRESSOR) tablet 25 mg, 25 mg, Oral, Daily  pantoprazole (PROTONIX) tablet 40 mg, 40 mg, Oral, QAM AC  levothyroxine (SYNTHROID) tablet 75 mcg, 75 mcg, Oral, Daily  folic acid (FOLVITE) tablet 1 mg, 1 mg, Oral, Daily  oxyCODONE-acetaminophen (PERCOCET) 5-325 MG per tablet 1 tablet, 1 tablet, Oral, Q4H PRN  dextrose 5 % and 0.9 % sodium chloride infusion, , Intravenous, Continuous  piperacillin-tazobactam (ZOSYN) 3,375 mg in dextrose 5 % 50 mL IVPB extended infusion (mini-bag), 3,375 mg, Intravenous, Q12H **AND** 0.9 % sodium chloride infusion, , Intravenous, Q12H  insulin glargine (LANTUS) injection vial 7 Units, 7 Units, Subcutaneous, Daily  insulin lispro (HUMALOG) injection vial 0-12 Units, 0-12 Units, Subcutaneous, TID WC  insulin lispro (HUMALOG) injection vial 0-6 Units, 0-6 Units, Subcutaneous, Nightly  gentamicin (GARAMYCIN) 0.1 % cream, , Topical, Daily  sodium chloride flush 0.9 % injection 5-40 mL, 5-40 mL, Intravenous, BID  heparin flush 100 UNIT/ML injection 100 Units, 100 Units, Intracatheter, PRN  Allergies:  Cefepime and Toradol [ketorolac tromethamine]  negative    PHYSICAL EXAM:      Vitals:    VITALS:  /80   Pulse 104   Temp 97.7 °F (36.5 °C) (Oral)   Resp 16   Ht 5' 4\" (1.626 m)   Wt 138 lb 3.7 oz (62.7 kg)   LMP 02/21/2021   SpO2 98%   BMI 23.73 kg/m²   24HR INTAKE/OUTPUT:      Intake/Output Summary (Last 24 hours) at 6/23/2021 1034  Last data filed at 6/23/2021 0858  Gross per 24 hour   Intake 1210.38 ml   Output    Net 1210.38 ml       PD Catheter Exam:  PD catheter exit site clean    Constitutional: Awake in no acute distress  HEENT:  Normocephalic, PERRL  Respiratory: Decreased breath sounds on the basis  Cardiovascular/Edema:  RRR, S1/S2  Gastrointestinal:  Soft, PD catheter exit site clean   Neurologic:  Nonfocal, AVELAR  Skin:  Warm, dry, no lesions  Other:  no edema      DATA:    CBC:   Lab Results   Component Value Date    WBC 5.3 06/23/2021    RBC 2.09 06/23/2021    HGB 6.4 06/23/2021    HCT 19.5 06/23/2021    MCV 93.3 06/23/2021    MCH 30.6 06/23/2021    MCHC 32.8 06/23/2021    RDW 14.1 06/23/2021     06/23/2021    MPV 10.0 06/23/2021     CMP:    Lab Results   Component Value Date     06/23/2021    K 4.3 06/23/2021    K 4.0 06/22/2021    CL 96 06/23/2021    CO2 26 06/23/2021    BUN 37 06/23/2021    CREATININE 8.0 06/23/2021    GFRAA 7 06/23/2021    LABGLOM 7 06/23/2021    GLUCOSE 260 06/23/2021    GLUCOSE 130 05/18/2012    PROT 4.7 06/23/2021    LABALBU 2.4 06/23/2021    LABALBU 4.1 05/18/2012    CALCIUM 7.8 06/23/2021    BILITOT <0.2 06/23/2021    ALKPHOS 147 06/23/2021    AST 16 06/23/2021    ALT 13 06/23/2021     Magnesium:    Lab Results   Component Value Date    MG 2.7 06/23/2021     Phosphorus:    Lab Results   Component Value Date    PHOS 3.6 06/23/2021     Radiology Review:      Chest x-ray June 21, 2021   No acute process.             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 In the emergency room she was found to have a glucose level of 874, beta hydroxybutyrate of 3.94. She was admitted to MICU. 1. ESRD on peritoneal dialysis, to continue CCPD 4 exchanges over 10 hours of 1.5% with long dwell of 2.5%  2. HTN, on hydralazine 10 mg twice daily and metoprolol 25 mg daily  3. UTI, on piperacillin-tazobactam  4. MBD of CKD, on sevelamer  5. Anemia of CKD,  6. Severe hypoalbuminemia/malnutrition    Plan:    · CCPD 4 exchanges over 10 hours of 1.5% with long dwell of 2.5%  · Monitor electrolytes  · Epoetin alpha 3000 units 3 times a week  · SPA 25 gm iv q6 x 8 doses    Thank you very much Dr. Kieran Ricci for allowing us to participate in the care of [facility].

## 2021-06-23 NOTE — PROGRESS NOTES
Spoke with Dr. Venita Rutherford in regards to patient hemoglobin of 6.4. Dr. Andrew Cooper that he was going to look at patients chart and address the issue.

## 2021-06-23 NOTE — CARE COORDINATION
SS Note: No Covid testing. SW met with pt for transition of care. Pt stated she and her two children reside with pt's mother in one floor apartment, pt's mother is caring for pt's children during this hospitalization. Stated she is independent with ambulation, uses wheelchair for longer distances when needed, also owns shower chair and bedside commode. Pt was at [address] [place] [address] in February 2021, history of [facility]. States does her own peritoneal dialysis at home at night and gets her supplies at [employer]. States she has all her equipment for the dialysis at home. PCP: Dr. Cristina Carbajal. Patient has prescription coverage, uses [facility]. Pt plans to return home upon discharge, mother will transport pt home, denies any needs including HHC.   Electronically signed by HANNAH Sky on 6/23/2021 at 11:09 AM

## 2021-06-23 NOTE — PROGRESS NOTES
[place] [address] Hospitalist   Progress Note    Admitting Date and Time: 6/21/2021 10:37 PM  Admit Dx: DKA, type 1, not at goal [facility]) [E10.10]  Hyperosmolality [E87.0]    Subjective/interval history:    Pt transferred to general medical floor yesterday. Patient is complaining of uncontrolled heartburn despite PPI, worsening of chronic musculoskeletal pain. Also requesting carbohydrate controlled diet, as she adheres to this at home. States that she is careful about watching her potassium and phosphorus intake. ROS: denies fever, chills, cp, sob, n/v, HA unless stated above.      albumin human  25 g Intravenous Q6H    sodium chloride flush  10 mL Intravenous 2 times per day    sodium chloride  15 mL/kg Intravenous Once    heparin (porcine)  5,000 Units Subcutaneous 3 times per day    metoclopramide  5 mg Oral TID    rOPINIRole  0.25 mg Oral Nightly    mirtazapine  15 mg Oral Nightly    hydrOXYzine  25 mg Oral Daily    ARIPiprazole  5 mg Oral Nightly    sevelamer  800 mg Oral TID WC    hydrALAZINE  10 mg Oral BID    metoprolol tartrate  25 mg Oral Daily    pantoprazole  40 mg Oral QAM AC    levothyroxine  75 mcg Oral Daily    folic acid  1 mg Oral Daily    piperacillin-tazobactam  3,375 mg Intravenous Q12H    insulin glargine  7 Units Subcutaneous Daily    insulin lispro  0-12 Units Subcutaneous TID WC    insulin lispro  0-6 Units Subcutaneous Nightly    gentamicin   Topical Daily    sodium chloride flush  5-40 mL Intravenous BID     sodium chloride, 250 mL, PRN  glucose, 15 g, PRN  dextrose, 12.5 g, PRN  glucagon (rDNA), 1 mg, PRN  dextrose, 100 mL/hr, PRN  sodium chloride flush, 10 mL, PRN  sodium chloride, 25 mL, PRN  promethazine, 12.5 mg, Q6H PRN   Or  ondansetron, 4 mg, Q6H PRN  acetaminophen, 650 mg, Q6H PRN   Or  acetaminophen, 650 mg, Q6H PRN  polyethylene glycol, 17 g, Daily PRN  albuterol, 2.5 mg, Q6H PRN  oxyCODONE-acetaminophen, 1 tablet, Q4H PRN  heparin flush, 100 Units, PRN         Objective:    /82   Pulse 101   Temp 97.9 °F (36.6 °C) (Oral)   Resp 16   Ht 5' 4\" (1.626 m)   Wt 138 lb 3.7 oz (62.7 kg)   LMP 02/21/2021   SpO2 97%   BMI 23.73 kg/m²   General Appearance: Somnolent, but awakens easily to voice. Appears slightly anxious. Oriented x3. Skin: warm and dry  Head: normocephalic and atraumatic  Eyes: pupils equal, round, and reactive to light, extraocular eye movements intact, conjunctivae normal  Neck: neck supple and non tender without mass   Pulmonary/Chest: Nonlabored on room air. Clear to auscultation bilaterally. Cardiovascular: normal rate, normal S1 and S2 and no carotid bruits  Abdomen: soft, lower abdominal tenderness without peritoneal signs, non-distended, normal bowel sounds, no masses or organomegaly. PD catheter in place  Extremities: no cyanosis, no clubbing and no edema  Neurologic: no cranial nerve deficit and speech normal      Recent Labs     06/22/21  0345 06/22/21  0954 06/23/21  0645   * 134 131*   K 4.0 4.4 4.3   CL 88* 96* 96*   CO2 23 27 26   BUN 37* 36* 37*   CREATININE 7.9* 8.0* 8.0*   GLUCOSE 739* 78 260*   CALCIUM 7.9* 8.1* 7.8*       Recent Labs     06/21/21  2321 06/23/21  0645   ALKPHOS 222* 147*   PROT 6.6 4.7*   LABALBU 2.9* 2.4*   BILITOT 0.3 <0.2   AST 26 16   ALT 23 13       Recent Labs     06/21/21  2321 06/23/21  0645 06/23/21  1450   WBC 11.4 5.3  --    RBC 3.16* 2.09*  --    HGB 9.6* 6.4* 7.7*   HCT 31.7* 19.5* 23.7*   .3* 93.3  --    MCH 30.4 30.6  --    MCHC 30.3* 32.8  --    RDW 14.2 14.1  --     197  --    MPV 10.0 10.0  --        Radiology:   XR CHEST PORTABLE   Final Result   No acute process.              Assessment/Plan:  Principal Problem:    DKA, type 1, not at goal [facility])  Active Problems:    Hypertension    Uncontrolled type 1 diabetes mellitus with hyperglycemia, with long-term current use of insulin ([facility]    ESRD on peritoneal dialysis ([facility]    Acute cystitis Hyperosmolality    Major depressive disorder  Resolved Problems:    * No resolved hospital problems. *      1. HHS/possible early DKA  -resolved with IV fluids and insulin drip protocol  -Continue subcutaneous insulin regimen    2. Urinary tract infection/acute cystitis  -Ceftriaxone changed to Zosyn given Enterococcus infection in the past  -Urine culture is gram-positive organisms; final result pending    3. End-stage renal disease on peritoneal dialysis  -Nephrology following for PD management    4. Uncontrolled type 1 diabetes mellitus  -Now off insulin drip; continue subcutaneous insulin regimen    5. Essential hypertension  -Continue home hydralazine and metoprolol tartrate    6. Depression  -Continue home Abilify    DVT prophylaxis: Subcutaneous heparin  CODE STATUS: Full code     Disposition: Likely discharge to home in 1 to 2 days after final urine culture results available    NOTE: This report was transcribed using voice recognition software. Every effort was made to ensure accuracy; however, inadvertent computerized transcription errors may be present.      Electronically signed by Pardeep Jimenez DO on 6/23/2021 at 3:39 PM

## 2021-06-23 NOTE — PROGRESS NOTES
[address] [facility]  [facility] [place]. [place]        Date:2021                                                  Patient Name: Nyla Ryder    MRN: [id number]    : 1990    Room: [facility]2238-61      Evaluating OT: Dali Palmer OTR/L; [id number]     Referring Provider and Specific Provider Orders/Date:      21 1030  OT eval and treat Start: 21 1030, End: 21 1030, ONE TIME, Standing Count: 1 Occurrences, Evelyne Luo MD     Placement Recommendation: HHOT       Diagnosis:   1. Diabetic ketoacidosis without coma associated with other specified diabetes mellitus ([address]    2. Chronic kidney disease, unspecified CKD stage    3. Hyperkalemia    4.  Urinary tract infection without hematuria, site unspecified         Surgery:  None      Pertinent Medical History:       Past Medical History:   Diagnosis Date    Acute congestive heart failure ([address]     BC (acute kidney injury) ([address] 10/1/2019    Cardiac arrest ([address] 2/15/2021    Cephalgia 10/9/2019    Chronic kidney disease     Depression     Diabetes mellitus ([address]     Diabetic gastroparesis associated with type 1 diabetes mellitus ([address] 2018    Diabetic ketoacidosis ([address] 2011    Diabetic ketoacidosis with coma associated with type 1 diabetes mellitus ([address] 2013    Diabetic polyneuropathy associated with type 1 diabetes mellitus ([address] 2020    Drug use complicating pregnancy in third trimester     Endocarditis 10/31/2020    ESRD (end stage renal disease) ([address] 2020    Hemodialysis patient ([address]     History of blood transfusion 2019    Hyperlipidemia 10/8/2020    Hyperosmolar hyperglycemic state (HHS) ([address] 2020    Hypothyroidism 10/8/2020    Iron deficiency anemia 10/1/2019    MDRO (multiple drug resistant organisms) resistance     MRSA (methicillin resistant Staphylococcus aureus) back wound abcess    Non compliance w medication regimen 3/30/2016    Other disorders of kidney and ureter     Pregnancy 3/30/2016    16 weeks    Previous  delivery affecting pregnancy, antepartum 3/2/2017    Previous stillbirth or demise, antepartum 2016    Seizure ([address] 2020    Severe pre-eclampsia in third trimester 2016    Shock liver 2/15/2021    Valvular endocarditis 11/10/2020    This Diagnosis was added to the Problem List based on transcribed orders from Dr. Amy Petty            Past Surgical History:   Procedure Laterality Date    BACK SURGERY      abscess   [address]      x2    CHOLECYSTECTOMY, LAPAROSCOPIC N/A 2019    CHOLECYSTECTOMY LAPAROSCOPIC performed by Thalia Landis MD at [address] [facility] N/A 2018    COLONOSCOPY WITH BIOPSY performed by Lukas Gama MD at [facility] N/A 2018    COLONOSCOPY WITH BIOPSY performed by Jimbo Mayes MD at [address]  2012    EF 57%    ECHO COMPL W DOP COLOR FLOW  6/10/2013         EMBOLECTOMY N/A 2020    [address] [place] [address], [address] [place] [address], YULISSA -- REQS ROOM 3 performed by Roxy De Jesus MD at [address] [place] [address] Left 2021    LEFT LEG INCISION AND DRAINAGE, DEBRIDEMENT, WOUND VAC APPLICATION performed by Derek Fish DPM at [address] [place] [address] Left 2021    LEFT LEG DEBRIDEMENT BIOPSY POSS APPLICATION WOUND VAC performed by Derek Fish DPM at [place] [address] N/A 2020    LAPAROSCOPIC INSERTION PERITONEAL DIALYSIS CATHETER performed by Allan Hunt MD at [facility] ESOPHAGOGASTRODUODENOSCOPY TRANSORAL DIAGNOSTIC N/A 2018    EGD ESOPHAGOGASTRODUODENOSCOPY performed by Lukas aGma MD at [address] [place] [address] TRANSESOPHAGEAL ECHOCARDIOGRAM N/A 10/19/2020    TRANSESOPHAGEAL ECHOCARDIOGRAM WITH BUBBLE STUDY performed by Darby Perez MD at [address] [place] [address] TUNNELED VENOUS PORT PLACEMENT  04/2018    UPPER GASTROINTESTINAL ENDOSCOPY  12/18/2018    EGD BIOPSY performed by Celestino Olivas MD at [place] [address] 10/11/2019    EGD ESOPHAGOGASTRODUODENOSCOPY performed by Onur Montoya DO at [facility] ENDOSCOPY      Precautions:  Fall Risk, hemodialysis     Assessment of current deficits    [x] Functional mobility  [x]ADLs  [x] Strength               []Cognition    [x] Functional transfers   [x] IADLs         [] Safety Awareness   [x]Endurance    [] Fine Coordination              [x] Balance      [] Vision/perception   []Sensation     []Gross Motor Coordination  [] ROM  [] Delirium                   [] Motor Control     OT PLAN OF CARE   OT POC based on physician orders, patient diagnosis and results of clinical assessment    Frequency/Duration 1-3 days/wk for 2 weeks PRN     Specific OT Treatment Interventions to include:   * Instruction/training on adapted ADL techniques and AE recommendations to increase functional independence within precautions       * Training on energy conservation strategies, correct breathing pattern and techniques to improve independence/tolerance for self-care routine  * Functional transfer/mobility training/DME recommendations for increased independence, safety, and fall prevention  * Patient/Family education to increase follow through with safety techniques and functional independence  * Recommendation of environmental modifications for increased safety with functional transfers/mobility and ADLs  * Therapeutic exercise to improve motor endurance, ROM, and functional strength for ADLs/functional transfers  * Therapeutic activities to facilitate/challenge dynamic balance, stand tolerance for increased safety and independence with ADLs  * Positioning to improve skin integrity, interaction with environment and functional independence    Recommended Adaptive Equipment: TBD      Home Living: with family: mom and 2 children; 2nd floor apartment, 6 steps to enter with rail, tub shower. Equipment owned: wheelchair, bedside commode, shower chair     Prior Level of Function: Independent with ADLs , Independent with IADLs; ambulated no device inside the house but uses a wheelchair for community distances     Pain Level: 8/10 pain in back and \"bottom of my stomach\"; Nursing notified. Cognition: A&O: 4/4; Follows 3 step directions   Memory: good    Sequencing: good    Problem solving: good    Judgement/safety: good     [facility]   AM-PAC Daily Activity Inpatient   How much help for putting on and taking off regular lower body clothing?: A Little  How much help for Bathing?: A Little  How much help for Toileting?: A Little  How much help for putting on and taking off regular upper body clothing?: A Little  How much help for taking care of personal grooming?: A Little  How much help for eating meals?: None  AM-[facility] Inpatient Daily Activity Raw Score: 19  AM-PAC Inpatient ADL T-Scale Score : 40.22  ADL Inpatient CMS 0-100% Score: 42.8  ADL Inpatient CMS G-Code Modifier : CK     Functional Assessment:    Initial Eval Status  Date: 6/23/21 Treatment Status  Date: STGs = LTGs  Time frame: 10-14 days   Feeding Independent   Independent    Grooming Supervision   Independent    UB Dressing Supervision  Independent    LB Dressing Supervision to don and doff incontinent garment  Independent    Bathing Minimal Assist  Independent    Toileting Supervision for hygiene and to stand at sink level to wash and dry hands     Independent    Bed Mobility  Supine to sit: Supervision   Sit to supine: Supervision   Supine to sit: Independent   Sit to supine: Independent    Functional Transfers Supervision from [facility]  Transfer training with verbal cues for hand placement throughout session to improve safety.    Independent    Functional Mobility Supervision with IV pole  Independent    Balance Sitting:     Static: fair     Dynamic: fair   Standing: fair with no device     Activity Tolerance fair   good    Visual/  Perceptual Glasses: yes                 Hand Dominance: left      AROM (PROM) Strength Additional Info:    RUE  WFL 4/5 good  and wfl FMC/dexterity noted during ADL tasks     LUE WFL 4/5 good  and wfl FMC/dexterity noted during ADL tasks       Hearing: WFL   Sensation:  No c/o numbness or tingling  Tone: WFL   Edema: none     Comments: Upon arrival patient supine. At end of session, patient was returned [facility] with call light and phone within reach, all lines and tubes intact. Overall patient demonstrated decreased independence and safety during completion of ADL/functional transfer/mobility tasks. Pt would benefit from continued skilled OT to increase safety and independence with completion of ADL/IADL tasks for functional independence and quality of life. Treatment: OT treatment provided this date includes:    Instruction/training on safety and adapted techniques for completion of ADLs    Instruction/training on safe functional mobility/transfer techniques    Instruction/training on energy conservation/work simplification for completion of ADLs    Proper Positioning/Alignment    Rehab Potential: Good for established goals. Patient / Family Goal: return home      Patient and/or family were instructed on functional diagnosis, prognosis/goals and OT plan of care. Demonstrated good understanding.      Eval Complexity: Low    Time In: 2:25pm   Time Out: 2:45pm       Min Units   OT Eval Low 23395  X     OT Eval Medium 73439      OT Eval High 85009      OT Re-Eval [id number]            ADL/Self Care 99138     Therapeutic Activities 03553       Therapeutic Ex 28887       Orthotic Management 75121       Manual [id number]     Neuro Re-Ed 95866       Non-Billable Time     Total Treatment Time:   0  0      Evaluation Time additionally includes thorough review of current medical information, gathering information on past medical history/social history and prior level of function, interpretation of standardized testing/informal observation of tasks, assessment of data and development of plan of care and goals.         Evaluating OT: Dina Crocker OTR/L; [id number]

## 2021-06-24 ENCOUNTER — APPOINTMENT (OUTPATIENT)
Dept: CT IMAGING | Age: 31
DRG: 420 | End: 2021-06-24
Payer: COMMERCIAL

## 2021-06-24 LAB
ALBUMIN SERPL-MCNC: 3.3 G/DL (ref 3.5–5.2)
ALP BLD-CCNC: 107 U/L (ref 35–104)
ALT SERPL-CCNC: 8 U/L (ref 0–32)
ANION GAP SERPL CALCULATED.3IONS-SCNC: 12 MMOL/L (ref 7–16)
AST SERPL-CCNC: 9 U/L (ref 0–31)
BASOPHILS ABSOLUTE: 0.07 E9/L (ref 0–0.2)
BASOPHILS RELATIVE PERCENT: 1 % (ref 0–2)
BILIRUB SERPL-MCNC: 0.2 MG/DL (ref 0–1.2)
BUN BLDV-MCNC: 26 MG/DL (ref 6–20)
CALCIUM SERPL-MCNC: 8.6 MG/DL (ref 8.6–10.2)
CHLORIDE BLD-SCNC: 100 MMOL/L (ref 98–107)
CO2: 27 MMOL/L (ref 22–29)
CREAT SERPL-MCNC: 6.9 MG/DL (ref 0.5–1)
EOSINOPHILS ABSOLUTE: 0.2 E9/L (ref 0.05–0.5)
EOSINOPHILS RELATIVE PERCENT: 3 % (ref 0–6)
GFR AFRICAN AMERICAN: 9
GFR NON-AFRICAN AMERICAN: 9 ML/MIN/1.73
GLUCOSE BLD-MCNC: 168 MG/DL (ref 74–99)
HCT VFR BLD CALC: 22.7 % (ref 34–48)
HEMOGLOBIN: 7.7 G/DL (ref 11.5–15.5)
IMMATURE GRANULOCYTES #: 0.05 E9/L
IMMATURE GRANULOCYTES %: 0.7 % (ref 0–5)
LYMPHOCYTES ABSOLUTE: 2.34 E9/L (ref 1.5–4)
LYMPHOCYTES RELATIVE PERCENT: 34.6 % (ref 20–42)
MAGNESIUM: 2.4 MG/DL (ref 1.6–2.6)
MCH RBC QN AUTO: 30.6 PG (ref 26–35)
MCHC RBC AUTO-ENTMCNC: 33.9 % (ref 32–34.5)
MCV RBC AUTO: 90.1 FL (ref 80–99.9)
METER GLUCOSE: 110 MG/DL (ref 74–99)
METER GLUCOSE: 130 MG/DL (ref 74–99)
METER GLUCOSE: 137 MG/DL (ref 74–99)
METER GLUCOSE: 173 MG/DL (ref 74–99)
METER GLUCOSE: 56 MG/DL (ref 74–99)
METER GLUCOSE: 58 MG/DL (ref 74–99)
MONOCYTES ABSOLUTE: 0.32 E9/L (ref 0.1–0.95)
MONOCYTES RELATIVE PERCENT: 4.7 % (ref 2–12)
NEUTROPHILS ABSOLUTE: 3.78 E9/L (ref 1.8–7.3)
NEUTROPHILS RELATIVE PERCENT: 56 % (ref 43–80)
PDW BLD-RTO: 13.4 FL (ref 11.5–15)
PHOSPHORUS: 3.3 MG/DL (ref 2.5–4.5)
PLATELET # BLD: 197 E9/L (ref 130–450)
PMV BLD AUTO: 9.8 FL (ref 7–12)
POTASSIUM SERPL-SCNC: 3.5 MMOL/L (ref 3.5–5)
RBC # BLD: 2.52 E12/L (ref 3.5–5.5)
SODIUM BLD-SCNC: 139 MMOL/L (ref 132–146)
TOTAL PROTEIN: 5.1 G/DL (ref 6.4–8.3)
URINE CULTURE, ROUTINE: NORMAL
WBC # BLD: 6.8 E9/L (ref 4.5–11.5)

## 2021-06-24 PROCEDURE — 6360000004 HC RX CONTRAST MEDICATION: Performed by: RADIOLOGY

## 2021-06-24 PROCEDURE — 84100 ASSAY OF PHOSPHORUS: CPT

## 2021-06-24 PROCEDURE — 2580000003 HC RX 258: Performed by: INTERNAL MEDICINE

## 2021-06-24 PROCEDURE — 74176 CT ABD & PELVIS W/O CONTRAST: CPT

## 2021-06-24 PROCEDURE — 6360000002 HC RX W HCPCS: Performed by: NURSE PRACTITIONER

## 2021-06-24 PROCEDURE — 2500000003 HC RX 250 WO HCPCS: Performed by: INTERNAL MEDICINE

## 2021-06-24 PROCEDURE — 85025 COMPLETE CBC W/AUTO DIFF WBC: CPT

## 2021-06-24 PROCEDURE — 6360000002 HC RX W HCPCS: Performed by: INTERNAL MEDICINE

## 2021-06-24 PROCEDURE — P9047 ALBUMIN (HUMAN), 25%, 50ML: HCPCS | Performed by: INTERNAL MEDICINE

## 2021-06-24 PROCEDURE — 83735 ASSAY OF MAGNESIUM: CPT

## 2021-06-24 PROCEDURE — 1200000000 HC SEMI PRIVATE

## 2021-06-24 PROCEDURE — 80053 COMPREHEN METABOLIC PANEL: CPT

## 2021-06-24 PROCEDURE — 6370000000 HC RX 637 (ALT 250 FOR IP): Performed by: INTERNAL MEDICINE

## 2021-06-24 PROCEDURE — 36415 COLL VENOUS BLD VENIPUNCTURE: CPT

## 2021-06-24 PROCEDURE — 90945 DIALYSIS ONE EVALUATION: CPT

## 2021-06-24 PROCEDURE — 36592 COLLECT BLOOD FROM PICC: CPT

## 2021-06-24 PROCEDURE — 82962 GLUCOSE BLOOD TEST: CPT

## 2021-06-24 PROCEDURE — 99232 SBSQ HOSP IP/OBS MODERATE 35: CPT | Performed by: INTERNAL MEDICINE

## 2021-06-24 RX ORDER — FENTANYL CITRATE 50 UG/ML
25 INJECTION, SOLUTION INTRAMUSCULAR; INTRAVENOUS
Status: DISCONTINUED | OUTPATIENT
Start: 2021-06-24 | End: 2021-06-26

## 2021-06-24 RX ADMIN — INSULIN GLARGINE 7 UNITS: 100 INJECTION, SOLUTION SUBCUTANEOUS at 10:28

## 2021-06-24 RX ADMIN — FENTANYL CITRATE 25 MCG: 50 INJECTION INTRAMUSCULAR; INTRAVENOUS at 03:52

## 2021-06-24 RX ADMIN — ROPINIROLE HYDROCHLORIDE 0.25 MG: 0.25 TABLET, FILM COATED ORAL at 22:45

## 2021-06-24 RX ADMIN — FAMOTIDINE 10 MG: 10 INJECTION INTRAVENOUS at 08:45

## 2021-06-24 RX ADMIN — METOPROLOL TARTRATE 25 MG: 25 TABLET, FILM COATED ORAL at 08:44

## 2021-06-24 RX ADMIN — IOHEXOL 50 ML: 240 INJECTION, SOLUTION INTRATHECAL; INTRAVASCULAR; INTRAVENOUS; ORAL at 18:02

## 2021-06-24 RX ADMIN — PANTOPRAZOLE SODIUM 40 MG: 40 TABLET, DELAYED RELEASE ORAL at 05:33

## 2021-06-24 RX ADMIN — METOCLOPRAMIDE HYDROCHLORIDE 5 MG: 5 TABLET ORAL at 08:45

## 2021-06-24 RX ADMIN — FENTANYL CITRATE 25 MCG: 50 INJECTION INTRAMUSCULAR; INTRAVENOUS at 06:19

## 2021-06-24 RX ADMIN — SODIUM CHLORIDE: 9 INJECTION, SOLUTION INTRAVENOUS at 16:27

## 2021-06-24 RX ADMIN — ALBUMIN (HUMAN) 25 G: 0.25 INJECTION, SOLUTION INTRAVENOUS at 11:09

## 2021-06-24 RX ADMIN — HYDRALAZINE HYDROCHLORIDE 10 MG: 10 TABLET, FILM COATED ORAL at 08:44

## 2021-06-24 RX ADMIN — ARIPIPRAZOLE 5 MG: 5 TABLET ORAL at 22:45

## 2021-06-24 RX ADMIN — HEPARIN SODIUM 5000 UNITS: 5000 INJECTION INTRAVENOUS; SUBCUTANEOUS at 14:39

## 2021-06-24 RX ADMIN — METOCLOPRAMIDE HYDROCHLORIDE 5 MG: 5 TABLET ORAL at 21:02

## 2021-06-24 RX ADMIN — LEVOTHYROXINE SODIUM 75 MCG: 75 TABLET ORAL at 05:33

## 2021-06-24 RX ADMIN — HEPARIN SODIUM 5000 UNITS: 5000 INJECTION INTRAVENOUS; SUBCUTANEOUS at 05:43

## 2021-06-24 RX ADMIN — HEPARIN SODIUM 5000 UNITS: 5000 INJECTION INTRAVENOUS; SUBCUTANEOUS at 22:00

## 2021-06-24 RX ADMIN — FENTANYL CITRATE 25 MCG: 50 INJECTION INTRAMUSCULAR; INTRAVENOUS at 14:38

## 2021-06-24 RX ADMIN — MIRTAZAPINE 15 MG: 15 TABLET, FILM COATED ORAL at 21:02

## 2021-06-24 RX ADMIN — INSULIN LISPRO 2 UNITS: 100 INJECTION, SOLUTION INTRAVENOUS; SUBCUTANEOUS at 08:50

## 2021-06-24 RX ADMIN — SEVELAMER CARBONATE 800 MG: 800 TABLET, FILM COATED ORAL at 16:58

## 2021-06-24 RX ADMIN — Medication 10 ML: at 21:08

## 2021-06-24 RX ADMIN — OXYCODONE AND ACETAMINOPHEN 1 TABLET: 5; 325 TABLET ORAL at 16:58

## 2021-06-24 RX ADMIN — SEVELAMER CARBONATE 800 MG: 800 TABLET, FILM COATED ORAL at 12:27

## 2021-06-24 RX ADMIN — FENTANYL CITRATE 25 MCG: 50 INJECTION INTRAMUSCULAR; INTRAVENOUS at 08:56

## 2021-06-24 RX ADMIN — FENTANYL CITRATE 25 MCG: 50 INJECTION, SOLUTION INTRAMUSCULAR; INTRAVENOUS at 21:04

## 2021-06-24 RX ADMIN — OXYCODONE AND ACETAMINOPHEN 1 TABLET: 5; 325 TABLET ORAL at 10:24

## 2021-06-24 RX ADMIN — Medication 10 ML: at 09:00

## 2021-06-24 RX ADMIN — ALBUMIN (HUMAN) 25 G: 0.25 INJECTION, SOLUTION INTRAVENOUS at 05:33

## 2021-06-24 RX ADMIN — HYDROXYZINE HYDROCHLORIDE 25 MG: 25 TABLET ORAL at 08:45

## 2021-06-24 RX ADMIN — SEVELAMER CARBONATE 800 MG: 800 TABLET, FILM COATED ORAL at 08:45

## 2021-06-24 RX ADMIN — ALBUMIN (HUMAN) 25 G: 0.25 INJECTION, SOLUTION INTRAVENOUS at 16:58

## 2021-06-24 RX ADMIN — FENTANYL CITRATE 25 MCG: 50 INJECTION INTRAMUSCULAR; INTRAVENOUS at 12:27

## 2021-06-24 RX ADMIN — METOCLOPRAMIDE HYDROCHLORIDE 5 MG: 5 TABLET ORAL at 14:37

## 2021-06-24 RX ADMIN — HYDRALAZINE HYDROCHLORIDE 10 MG: 10 TABLET, FILM COATED ORAL at 21:02

## 2021-06-24 RX ADMIN — PIPERACILLIN SODIUM AND TAZOBACTAM SODIUM 3375 MG: 3; .375 INJECTION, POWDER, LYOPHILIZED, FOR SOLUTION INTRAVENOUS at 12:27

## 2021-06-24 RX ADMIN — FOLIC ACID 1 MG: 1 TABLET ORAL at 08:45

## 2021-06-24 RX ADMIN — SODIUM CHLORIDE: 9 INJECTION, SOLUTION INTRAVENOUS at 03:35

## 2021-06-24 ASSESSMENT — PAIN SCALES - GENERAL
PAINLEVEL_OUTOF10: 4
PAINLEVEL_OUTOF10: 8
PAINLEVEL_OUTOF10: 6
PAINLEVEL_OUTOF10: 8
PAINLEVEL_OUTOF10: 8
PAINLEVEL_OUTOF10: 6
PAINLEVEL_OUTOF10: 10
PAINLEVEL_OUTOF10: 7
PAINLEVEL_OUTOF10: 6
PAINLEVEL_OUTOF10: 8
PAINLEVEL_OUTOF10: 7
PAINLEVEL_OUTOF10: 5
PAINLEVEL_OUTOF10: 8

## 2021-06-24 ASSESSMENT — PAIN DESCRIPTION - LOCATION
LOCATION: ABDOMEN
LOCATION: ABDOMEN

## 2021-06-24 ASSESSMENT — PAIN DESCRIPTION - DESCRIPTORS: DESCRIPTORS: CONSTANT;ACHING;DISCOMFORT

## 2021-06-24 ASSESSMENT — PAIN DESCRIPTION - ORIENTATION
ORIENTATION: MID;UPPER;LOWER
ORIENTATION: MID;UPPER;LOWER

## 2021-06-24 ASSESSMENT — PAIN DESCRIPTION - PAIN TYPE
TYPE: CHRONIC PAIN
TYPE: CHRONIC PAIN

## 2021-06-24 ASSESSMENT — PAIN DESCRIPTION - FREQUENCY
FREQUENCY: CONTINUOUS
FREQUENCY: CONTINUOUS

## 2021-06-24 ASSESSMENT — PAIN DESCRIPTION - ONSET
ONSET: ON-GOING
ONSET: ON-GOING

## 2021-06-24 ASSESSMENT — PAIN DESCRIPTION - PROGRESSION
CLINICAL_PROGRESSION: NOT CHANGED
CLINICAL_PROGRESSION: NOT CHANGED

## 2021-06-24 NOTE — PROGRESS NOTES
[address] [place], [place] [address]ist   Progress Note    Admitting Date and Time: 6/21/2021 10:37 PM  Admit Dx: DKA, type 1, not at goal [facility]) [E10.10]  Hyperosmolality [E87.0]    Subjective/interval history:    Patient complaining of severe diffuse abdominal pain as well as 5 or 6 episodes of watery diarrhea. Per RN stool collected and sent for C. difficile testing. ROS: denies fever, chills, cp, sob, n/v, HA unless stated above.      albumin human  25 g Intravenous Q6H    famotidine (PEPCID) injection  10 mg Intravenous Daily    sodium chloride flush  10 mL Intravenous 2 times per day    sodium chloride  15 mL/kg Intravenous Once    heparin (porcine)  5,000 Units Subcutaneous 3 times per day    metoclopramide  5 mg Oral TID    rOPINIRole  0.25 mg Oral Nightly    mirtazapine  15 mg Oral Nightly    hydrOXYzine  25 mg Oral Daily    ARIPiprazole  5 mg Oral Nightly    sevelamer  800 mg Oral TID WC    hydrALAZINE  10 mg Oral BID    metoprolol tartrate  25 mg Oral Daily    pantoprazole  40 mg Oral QAM AC    levothyroxine  75 mcg Oral Daily    folic acid  1 mg Oral Daily    piperacillin-tazobactam  3,375 mg Intravenous Q12H    insulin glargine  7 Units Subcutaneous Daily    insulin lispro  0-12 Units Subcutaneous TID WC    insulin lispro  0-6 Units Subcutaneous Nightly    gentamicin   Topical Daily    sodium chloride flush  5-40 mL Intravenous BID     fentanNYL, 25 mcg, Q1H PRN  sodium chloride, 250 mL, PRN  glucose, 15 g, PRN  dextrose, 12.5 g, PRN  glucagon (rDNA), 1 mg, PRN  dextrose, 100 mL/hr, PRN  sodium chloride flush, 10 mL, PRN  sodium chloride, 25 mL, PRN  promethazine, 12.5 mg, Q6H PRN   Or  ondansetron, 4 mg, Q6H PRN  acetaminophen, 650 mg, Q6H PRN   Or  acetaminophen, 650 mg, Q6H PRN  polyethylene glycol, 17 g, Daily PRN  albuterol, 2.5 mg, Q6H PRN  oxyCODONE-acetaminophen, 1 tablet, Q4H PRN  heparin flush, 100 Units, PRN         Objective:    BP (!) 157/97   Pulse 104   Temp 98.3 °F (36.8 °C) (Oral)   Resp 20   Ht 5' 4\" (1.626 m)   Wt 138 lb (62.6 kg)   LMP 02/21/2021   SpO2 98%   BMI 23.69 kg/m²   General Appearance: Alert and oriented x3. Appears anxious and tearful. Skin: warm and dry  Head: normocephalic and atraumatic  Eyes: pupils equal, round, and reactive to light, extraocular eye movements intact, conjunctivae normal  Neck: neck supple and non tender without mass   Pulmonary/Chest: Nonlabored on room air. Clear to auscultation bilaterally. Cardiovascular: normal rate, normal S1 and S2 and no carotid bruits  Abdomen: soft, diffuse tenderness without peritoneal signs, non-distended, normal bowel sounds, no masses or organomegaly. PD catheter in place  Extremities: no cyanosis, no clubbing and no edema  Neurologic: no cranial nerve deficit and speech normal      Recent Labs     06/22/21  0954 06/23/21  0645 06/24/21  0540    131* 139   K 4.4 4.3 3.5   CL 96* 96* 100   CO2 27 26 27   BUN 36* 37* 26*   CREATININE 8.0* 8.0* 6.9*   GLUCOSE 78 260* 168*   CALCIUM 8.1* 7.8* 8.6       Recent Labs     06/21/21  2321 06/23/21  0645 06/24/21  0540   ALKPHOS 222* 147* 107*   PROT 6.6 4.7* 5.1*   LABALBU 2.9* 2.4* 3.3*   BILITOT 0.3 <0.2 0.2   AST 26 16 9   ALT 23 13 8       Recent Labs     06/21/21  2321 06/23/21  0645 06/23/21  1450 06/24/21  0540   WBC 11.4 5.3  --  6.8   RBC 3.16* 2.09*  --  2.52*   HGB 9.6* 6.4* 7.7* 7.7*   HCT 31.7* 19.5* 23.7* 22.7*   .3* 93.3  --  90.1   MCH 30.4 30.6  --  30.6   MCHC 30.3* 32.8  --  33.9   RDW 14.2 14.1  --  13.4    197  --  197   MPV 10.0 10.0  --  9.8       Radiology:   XR CHEST PORTABLE   Final Result   No acute process.          CT ABDOMEN PELVIS WO CONTRAST Additional Contrast? Radiologist Recommendation    (Results Pending)       Assessment/Plan:  Principal Problem:    DKA, type 1, not at goal [facility])  Active Problems:    Hypertension    Uncontrolled type 1 diabetes mellitus with hyperglycemia, with long-term current use of insulin ([facility] [address]    ESRD on peritoneal dialysis ([facility] [address]    Acute cystitis    Hyperosmolality    Major depressive disorder  Resolved Problems:    * No resolved hospital problems. *      1. HHS/possible early DKA in the setting of uncontrolled type 1 diabetes mellitus  -resolved with IV fluids and insulin drip protocol  -Continue subcutaneous insulin regimen    2. Urinary tract infection/acute cystitis  -Ceftriaxone changed to Zosyn given Enterococcus infection in the past  -Urine culture is gram-positive organisms; final result pending    3. Abdominal pain and diarrhea  -Given the fact that she receives frequent IV antibiotics, it is appropriate to check for C. Difficile. Sample sent  -Obtain CT of the abdomen pelvis given acute worsening in her pain    4. End-stage renal disease on peritoneal dialysis  -Nephrology following for PD management    5. Uncontrolled type 1 diabetes mellitus  -Now off insulin drip; continue subcutaneous insulin regimen    6. Essential hypertension  -Continue home hydralazine and metoprolol tartrate    7. Depression  -Continue home Abilify    DVT prophylaxis: Subcutaneous heparin  CODE STATUS: Full code       NOTE: This report was transcribed using voice recognition software. Every effort was made to ensure accuracy; however, inadvertent computerized transcription errors may be present.      Electronically signed by Pardeep Jimenez DO on 6/24/2021 at 2:58 PM

## 2021-06-24 NOTE — PROGRESS NOTES
Department of Internal Medicine  Nephrology Attending Progress Note    Events reviewed. SUBJECTIVE: We are following [address] [place] [address] for ESRD on peritoneal dialysis. Reports feeling better.     PHYSICAL EXAM:      Vitals:    VITALS:  BP (!) 157/97   Pulse 104   Temp 98.3 °F (36.8 °C) (Oral)   Resp 20   Ht 5' 4\" (1.626 m)   Wt 138 lb (62.6 kg)   LMP 02/21/2021   SpO2 98%   BMI 23.69 kg/m²   24HR INTAKE/OUTPUT:      Intake/Output Summary (Last 24 hours) at 6/24/2021 1105  Last data filed at 6/24/2021 1024  Gross per 24 hour   Intake 1030 ml   Output 1132 ml   Net -102 ml       PD Catheter Exam:  PD catheter exit site clean    Constitutional: Awake in no acute distress  HEENT:  Normocephalic, PERRL  Respiratory: Decreased breath sounds on the basis  Cardiovascular/Edema:  RRR, S1/S2  Gastrointestinal:  Soft, PD catheter exit site clean   Neurologic:  Nonfocal, AVELAR  Skin:  Warm, dry, no lesions  Other:  no edema    Scheduled Meds:   albumin human  25 g Intravenous Q6H    famotidine (PEPCID) injection  10 mg Intravenous Daily    sodium chloride flush  10 mL Intravenous 2 times per day    sodium chloride  15 mL/kg Intravenous Once    heparin (porcine)  5,000 Units Subcutaneous 3 times per day    metoclopramide  5 mg Oral TID    rOPINIRole  0.25 mg Oral Nightly    mirtazapine  15 mg Oral Nightly    hydrOXYzine  25 mg Oral Daily    ARIPiprazole  5 mg Oral Nightly    sevelamer  800 mg Oral TID WC    hydrALAZINE  10 mg Oral BID    metoprolol tartrate  25 mg Oral Daily    pantoprazole  40 mg Oral QAM AC    levothyroxine  75 mcg Oral Daily    folic acid  1 mg Oral Daily    piperacillin-tazobactam  3,375 mg Intravenous Q12H    insulin glargine  7 Units Subcutaneous Daily    insulin lispro  0-12 Units Subcutaneous TID WC    insulin lispro  0-6 Units Subcutaneous Nightly    gentamicin   Topical Daily    sodium chloride flush  5-40 mL Intravenous BID     Continuous Infusions:   sodium chloride  dextrose      insulin Stopped (06/22/21 1235)    sodium chloride 25 mL (06/23/21 1633)    sodium chloride Stopped (06/22/21 0804)    dextrose 5 % and 0.9 % NaCl Stopped (06/22/21 1235)    sodium chloride Stopped (06/24/21 0534)     PRN Meds:.fentanNYL, sodium chloride, glucose, dextrose, glucagon (rDNA), dextrose, sodium chloride flush, sodium chloride, promethazine **OR** ondansetron, acetaminophen **OR** acetaminophen, polyethylene glycol, albuterol, oxyCODONE-acetaminophen, heparin flush    DATA:    CBC:   Lab Results   Component Value Date    WBC 6.8 06/24/2021    RBC 2.52 06/24/2021    HGB 7.7 06/24/2021    HCT 22.7 06/24/2021    MCV 90.1 06/24/2021    MCH 30.6 06/24/2021    MCHC 33.9 06/24/2021    RDW 13.4 06/24/2021     06/24/2021    MPV 9.8 06/24/2021     CMP:    Lab Results   Component Value Date     06/24/2021    K 3.5 06/24/2021    K 4.0 06/22/2021     06/24/2021    CO2 27 06/24/2021    BUN 26 06/24/2021    CREATININE 6.9 06/24/2021    GFRAA 9 06/24/2021    LABGLOM 9 06/24/2021    GLUCOSE 168 06/24/2021    GLUCOSE 130 05/18/2012    PROT 5.1 06/24/2021    LABALBU 3.3 06/24/2021    LABALBU 4.1 05/18/2012    CALCIUM 8.6 06/24/2021    BILITOT 0.2 06/24/2021    ALKPHOS 107 06/24/2021    AST 9 06/24/2021    ALT 8 06/24/2021     Magnesium:    Lab Results   Component Value Date    MG 2.4 06/24/2021     Phosphorus:    Lab Results   Component Value Date    PHOS 3.3 06/24/2021     Radiology Review:      Chest x-ray June 21, 2021   No acute process.             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 In the emergency room she was found to have a glucose level of 874, beta hydroxybutyrate of 3.94. She was admitted to MICU. IMPRESSION/RECOMMENDATIONS:      1. ESRD on peritoneal dialysis, to continue CCPD 4 exchanges over 10 hours of 1.5% with long dwell of 2.5%  2. HTN, on hydralazine 10 mg twice daily and metoprolol 25 mg daily  3. UTI, on piperacillin-tazobactam  4. MBD of CKD, on sevelamer  5.  Anemia of CKD,  6. Severe hypoalbuminemia/malnutrition    Plan:    · Continue CCPD 4 exchanges over 10 hours of 1.5% with long dwell of 2.5%  · Continue to monitor electrolytes  · Continue epoetin alpha 3000 units 3 times a week  · Complete SPA 25 gm iv q6 x 8 doses

## 2021-06-24 NOTE — CARE COORDINATION
6/24/21 1546 CM note: NO COVID TESTING. Met with patient at the bedside to discuss discharge planning. Discharge plan remains home and patient declines [facility]. Patient does her own peritoneal dialysis at home at night and she gets her supplies from [facility]. Pts mom will provide transportation at discharge.  Electronically signed by Yany Guthrie RN on 6/24/2021 at 3:48 PM

## 2021-06-24 NOTE — ADT AUTH CERT
Diabetes - Care Day 2 (6/23/2021) by Celia Young RN       Review Status Review Entered   Completed 6/23/2021 16:56      Criteria Review      Care Day: 2 Care Date: 6/23/2021 Level of Care: Telemetry    Guideline Day 1    Clinical Status    (X) * Clinical Indications met    Routes    (X) IV fluids    6/23/2021 4:56 PM EDT by Nicolas Damian      albumin 25% 25g IV Q6hrs    (X) IV medications    6/23/2021 4:56 PM EDT by Nicolas Damian      pepcid 10mg IV daily, zosyn 3.375g IV Q12hrs, fentanyl 25mcg IV PRN x1    Interventions    (X) Investigate for precipitating causes    (X) Serum glucose, serum ketones, chemistries, ABGs or venous pH measurements, urinalysis    6/23/2021 4:56 PM EDT by Nicolas Damian      see notes for labs    Medications    (X) Possible IV insulin    * Milestone   Additional Notes   06/23/21 care day 2   VS T 97.8 P 104 R 20 BP 82/50 spo2 97% RA      Labs   Sodium: 131 (L)   Chloride: 96 (L)   BUN: 37 (H)   Creatinine: 8.0 (H)   Magnesium: 2.7 (H)   Glucose: 260 (H)   Calcium: 7.8 (L)   Total Protein: 4.7 (L)   Albumin: 2.4 (L)   Alk Phos: 147 (H)   RBC: 2.09 (L)   Hemoglobin Quant: 6.4 (LL)      6/23/2021 06:50   Meter Glucose: 237 (H)         Meds: albumin 25% 25g IV x2, abilify 5mg po nightly, pepcid 46SP IV daily, folic acid 1mg po daily, heparin 5000 units subq three times daily, hydralazine 10mg po twice daily, atarax 25mg po daily, lantus 7 units subq daily, insulin lispro ss subq four times daily, reglan 5mg po three times daily, protonix 40mg po daily, zosyn 3.375g IV Q12hrs, renvela 800mg po three times daily, fentanyl 25mcg IV PRN x1, phenergan 12.5mg po prn x1, percocet 5/325mg po prn x1         Nephrology note       IMPRESSION/RECOMMENDATIONS:         Jayleen Short is a 81-year-old female with history of ESRD on Peritoneal Dialysis, poorly controlled type I DM with multiple admissions for DKA, gastroparesis, HTN, infective endocarditis secondary to ruben epidermidis, cardiac arrest, who was admitted on June 22, 2021 after she presented to the ER reporting generalized weakness, pain and nausea, and having uncontrolled glucose levels.  In the emergency room she was found to have a glucose level of 874, beta hydroxybutyrate of 3.94.  She was admitted to MICU.       1. ESRD on peritoneal dialysis, to continue CCPD 4 exchanges over 10 hours of 1.5% with long dwell of 2.5%   2. HTN, on hydralazine 10 mg twice daily and metoprolol 25 mg daily   3. UTI, on piperacillin-tazobactam   4. MBD of CKD, on sevelamer   5. Anemia of CKD,   6. Severe hypoalbuminemia/malnutrition       Plan:       · CCPD 4 exchanges over 10 hours of 1.5% with long dwell of 2.5%   · Monitor electrolytes   · Epoetin alpha 3000 units 3 times a week   · SPA 25 gm iv q6 x 8 doses         IM NOTE   Assessment/Plan:   Principal Problem:     DKA, type 1, not at goal [facility])   Active Problems:     Hypertension     Uncontrolled type 1 diabetes mellitus with hyperglycemia, with long-term current use of insulin (HCC)     ESRD on peritoneal dialysis ([facility] [address]     Acute cystitis     Hyperosmolality     Major depressive disorder   Resolved Problems:     * No resolved hospital problems.  *           1.  HHS/possible early DKA   -resolved with IV fluids and insulin drip protocol   -Continue subcutaneous insulin regimen       2.  Urinary tract infection/acute cystitis   -Ceftriaxone changed to Zosyn given Enterococcus infection in the past   -Urine culture is gram-positive organisms; final result pending       3.  End-stage renal disease on peritoneal dialysis   -Nephrology following for PD management       4.  Uncontrolled type 1 diabetes mellitus   -Now off insulin drip; continue subcutaneous insulin regimen       5.  Essential hypertension   -Continue home hydralazine and metoprolol tartrate       6.  Depression   -Continue home Abilify       DVT prophylaxis: Subcutaneous heparin   CODE STATUS: Full code      Disposition: Likely discharge to home in 1 to 2 days after final urine culture results available      PA RECOMMENDATION by Lord Zehra RN       Review Status Review Entered   In Primary 2021 16:53      Criteria Review   slr keep in    Name: Radha Rowland   : 1990   CSN: [id number]   INSURANCE: [facility]  -----------------------------------------------------------------------------  [facility] TREATMENT [facility] Physician Advisor Recommendation    Based on the clinical acuity, complexity, hospital course, data review and physician/consultant impressions and findings noted below it is our recommendation to keep patient in INPATIENT status.  ---------------------------------------------------------------------------------  Summary of impressions and findings:     Clinical summary - 36 y.o. with DKA and UTI. Blood glucose 874 on admission with BHB 3.49. PH 7.45. pt was on insulin drip x1 day. Na 127 > 131. Receiving IV ATB's Zosyn. IV D5NS. Vitals -   Labs and imaging - UA +ve  MCG criteria -   Comments - Keep patient as Inpatient status. DKA with UTI. Chart reviewed and VUR notified. Elmer Ortega MD MPH  Physician [address]   Jennifer Montiel@"[employer]    The final decision of the patient's hospitalization status depends on the attending physician's judgment. The information in this document is a recommendation to be used for utilization review and utilization management purposes only. This recommendation is not an order. The recommendation is made based on the information reviewed at the time of the referral, is pursuant to the [employer] [facility] Conditions of Participation (42 CFR Part 482), and is neither a judgment nor an assessment with regard to the appropriateness or quality of clinical care. Nothing in this document may be used to limit, alter, or  affect clinical services provided to the patient.  The provider of services is ultimately responsible for the submission of a claim that has met all requirements for correct coding, billing, and reimbursement.

## 2021-06-25 LAB
ALBUMIN SERPL-MCNC: 4 G/DL (ref 3.5–5.2)
ALP BLD-CCNC: 95 U/L (ref 35–104)
ALT SERPL-CCNC: 8 U/L (ref 0–32)
ANION GAP SERPL CALCULATED.3IONS-SCNC: 11 MMOL/L (ref 7–16)
AST SERPL-CCNC: 12 U/L (ref 0–31)
BASOPHILS ABSOLUTE: 0.05 E9/L (ref 0–0.2)
BASOPHILS RELATIVE PERCENT: 0.7 % (ref 0–2)
BILIRUB SERPL-MCNC: 0.3 MG/DL (ref 0–1.2)
BUN BLDV-MCNC: 25 MG/DL (ref 6–20)
CALCIUM SERPL-MCNC: 8.9 MG/DL (ref 8.6–10.2)
CHLORIDE BLD-SCNC: 101 MMOL/L (ref 98–107)
CO2: 27 MMOL/L (ref 22–29)
CREAT SERPL-MCNC: 6.7 MG/DL (ref 0.5–1)
EOSINOPHILS ABSOLUTE: 0.2 E9/L (ref 0.05–0.5)
EOSINOPHILS RELATIVE PERCENT: 3 % (ref 0–6)
GFR AFRICAN AMERICAN: 9
GFR NON-AFRICAN AMERICAN: 9 ML/MIN/1.73
GLUCOSE BLD-MCNC: 169 MG/DL (ref 74–99)
HCT VFR BLD CALC: 21.9 % (ref 34–48)
HEMOGLOBIN: 7.4 G/DL (ref 11.5–15.5)
IMMATURE GRANULOCYTES #: 0.04 E9/L
IMMATURE GRANULOCYTES %: 0.6 % (ref 0–5)
LYMPHOCYTES ABSOLUTE: 2.09 E9/L (ref 1.5–4)
LYMPHOCYTES RELATIVE PERCENT: 30.9 % (ref 20–42)
MAGNESIUM: 2.2 MG/DL (ref 1.6–2.6)
MCH RBC QN AUTO: 30.8 PG (ref 26–35)
MCHC RBC AUTO-ENTMCNC: 33.8 % (ref 32–34.5)
MCV RBC AUTO: 91.3 FL (ref 80–99.9)
METER GLUCOSE: 116 MG/DL (ref 74–99)
METER GLUCOSE: 174 MG/DL (ref 74–99)
METER GLUCOSE: 191 MG/DL (ref 74–99)
METER GLUCOSE: 59 MG/DL (ref 74–99)
METER GLUCOSE: 62 MG/DL (ref 74–99)
METER GLUCOSE: 83 MG/DL (ref 74–99)
MONOCYTES ABSOLUTE: 0.31 E9/L (ref 0.1–0.95)
MONOCYTES RELATIVE PERCENT: 4.6 % (ref 2–12)
NEUTROPHILS ABSOLUTE: 4.08 E9/L (ref 1.8–7.3)
NEUTROPHILS RELATIVE PERCENT: 60.2 % (ref 43–80)
PDW BLD-RTO: 13.9 FL (ref 11.5–15)
PHOSPHORUS: 2.9 MG/DL (ref 2.5–4.5)
PLATELET # BLD: 186 E9/L (ref 130–450)
PMV BLD AUTO: 9.2 FL (ref 7–12)
POTASSIUM SERPL-SCNC: 3.7 MMOL/L (ref 3.5–5)
RBC # BLD: 2.4 E12/L (ref 3.5–5.5)
SODIUM BLD-SCNC: 139 MMOL/L (ref 132–146)
TOTAL PROTEIN: 5.5 G/DL (ref 6.4–8.3)
WBC # BLD: 6.8 E9/L (ref 4.5–11.5)

## 2021-06-25 PROCEDURE — 84100 ASSAY OF PHOSPHORUS: CPT

## 2021-06-25 PROCEDURE — P9047 ALBUMIN (HUMAN), 25%, 50ML: HCPCS | Performed by: INTERNAL MEDICINE

## 2021-06-25 PROCEDURE — 6360000002 HC RX W HCPCS: Performed by: INTERNAL MEDICINE

## 2021-06-25 PROCEDURE — 36592 COLLECT BLOOD FROM PICC: CPT

## 2021-06-25 PROCEDURE — 1200000000 HC SEMI PRIVATE

## 2021-06-25 PROCEDURE — 99231 SBSQ HOSP IP/OBS SF/LOW 25: CPT | Performed by: INTERNAL MEDICINE

## 2021-06-25 PROCEDURE — 82962 GLUCOSE BLOOD TEST: CPT

## 2021-06-25 PROCEDURE — 2580000003 HC RX 258: Performed by: INTERNAL MEDICINE

## 2021-06-25 PROCEDURE — 97530 THERAPEUTIC ACTIVITIES: CPT | Performed by: PHYSICAL THERAPIST

## 2021-06-25 PROCEDURE — 85025 COMPLETE CBC W/AUTO DIFF WBC: CPT

## 2021-06-25 PROCEDURE — 97116 GAIT TRAINING THERAPY: CPT | Performed by: PHYSICAL THERAPIST

## 2021-06-25 PROCEDURE — 83735 ASSAY OF MAGNESIUM: CPT

## 2021-06-25 PROCEDURE — 80053 COMPREHEN METABOLIC PANEL: CPT

## 2021-06-25 PROCEDURE — 6360000002 HC RX W HCPCS: Performed by: NURSE PRACTITIONER

## 2021-06-25 PROCEDURE — 2500000003 HC RX 250 WO HCPCS: Performed by: INTERNAL MEDICINE

## 2021-06-25 PROCEDURE — 90945 DIALYSIS ONE EVALUATION: CPT

## 2021-06-25 PROCEDURE — 6370000000 HC RX 637 (ALT 250 FOR IP): Performed by: INTERNAL MEDICINE

## 2021-06-25 PROCEDURE — 36415 COLL VENOUS BLD VENIPUNCTURE: CPT

## 2021-06-25 RX ADMIN — METOCLOPRAMIDE HYDROCHLORIDE 5 MG: 5 TABLET ORAL at 13:33

## 2021-06-25 RX ADMIN — ALBUMIN (HUMAN) 25 G: 0.25 INJECTION, SOLUTION INTRAVENOUS at 05:54

## 2021-06-25 RX ADMIN — METOPROLOL TARTRATE 25 MG: 25 TABLET, FILM COATED ORAL at 08:53

## 2021-06-25 RX ADMIN — INSULIN LISPRO 2 UNITS: 100 INJECTION, SOLUTION INTRAVENOUS; SUBCUTANEOUS at 09:00

## 2021-06-25 RX ADMIN — PIPERACILLIN SODIUM AND TAZOBACTAM SODIUM 3375 MG: 3; .375 INJECTION, POWDER, LYOPHILIZED, FOR SOLUTION INTRAVENOUS at 01:00

## 2021-06-25 RX ADMIN — FAMOTIDINE 10 MG: 10 INJECTION INTRAVENOUS at 08:54

## 2021-06-25 RX ADMIN — Medication 10 ML: at 20:11

## 2021-06-25 RX ADMIN — INSULIN GLARGINE 7 UNITS: 100 INJECTION, SOLUTION SUBCUTANEOUS at 09:00

## 2021-06-25 RX ADMIN — PIPERACILLIN SODIUM AND TAZOBACTAM SODIUM 3375 MG: 3; .375 INJECTION, POWDER, LYOPHILIZED, FOR SOLUTION INTRAVENOUS at 13:32

## 2021-06-25 RX ADMIN — ROPINIROLE HYDROCHLORIDE 0.25 MG: 0.25 TABLET, FILM COATED ORAL at 20:05

## 2021-06-25 RX ADMIN — METOCLOPRAMIDE HYDROCHLORIDE 5 MG: 5 TABLET ORAL at 20:04

## 2021-06-25 RX ADMIN — OXYCODONE AND ACETAMINOPHEN 1 TABLET: 5; 325 TABLET ORAL at 22:57

## 2021-06-25 RX ADMIN — ALBUMIN (HUMAN) 25 G: 0.25 INJECTION, SOLUTION INTRAVENOUS at 00:05

## 2021-06-25 RX ADMIN — OXYCODONE AND ACETAMINOPHEN 1 TABLET: 5; 325 TABLET ORAL at 08:52

## 2021-06-25 RX ADMIN — FENTANYL CITRATE 25 MCG: 50 INJECTION, SOLUTION INTRAMUSCULAR; INTRAVENOUS at 20:06

## 2021-06-25 RX ADMIN — FENTANYL CITRATE 25 MCG: 50 INJECTION, SOLUTION INTRAMUSCULAR; INTRAVENOUS at 10:01

## 2021-06-25 RX ADMIN — OXYCODONE AND ACETAMINOPHEN 1 TABLET: 5; 325 TABLET ORAL at 13:33

## 2021-06-25 RX ADMIN — FOLIC ACID 1 MG: 1 TABLET ORAL at 08:53

## 2021-06-25 RX ADMIN — ARIPIPRAZOLE 5 MG: 5 TABLET ORAL at 20:05

## 2021-06-25 RX ADMIN — HYDRALAZINE HYDROCHLORIDE 10 MG: 10 TABLET, FILM COATED ORAL at 20:05

## 2021-06-25 RX ADMIN — SEVELAMER CARBONATE 800 MG: 800 TABLET, FILM COATED ORAL at 18:04

## 2021-06-25 RX ADMIN — LEVOTHYROXINE SODIUM 75 MCG: 75 TABLET ORAL at 05:54

## 2021-06-25 RX ADMIN — SODIUM CHLORIDE: 9 INJECTION, SOLUTION INTRAVENOUS at 03:00

## 2021-06-25 RX ADMIN — SEVELAMER CARBONATE 800 MG: 800 TABLET, FILM COATED ORAL at 13:33

## 2021-06-25 RX ADMIN — SEVELAMER CARBONATE 800 MG: 800 TABLET, FILM COATED ORAL at 08:53

## 2021-06-25 RX ADMIN — FENTANYL CITRATE 25 MCG: 50 INJECTION, SOLUTION INTRAMUSCULAR; INTRAVENOUS at 00:04

## 2021-06-25 RX ADMIN — FENTANYL CITRATE 25 MCG: 50 INJECTION, SOLUTION INTRAMUSCULAR; INTRAVENOUS at 21:45

## 2021-06-25 RX ADMIN — MIRTAZAPINE 15 MG: 15 TABLET, FILM COATED ORAL at 20:04

## 2021-06-25 RX ADMIN — PANTOPRAZOLE SODIUM 40 MG: 40 TABLET, DELAYED RELEASE ORAL at 05:54

## 2021-06-25 RX ADMIN — HYDROXYZINE HYDROCHLORIDE 25 MG: 25 TABLET ORAL at 08:52

## 2021-06-25 RX ADMIN — METOCLOPRAMIDE HYDROCHLORIDE 5 MG: 5 TABLET ORAL at 08:53

## 2021-06-25 RX ADMIN — HEPARIN SODIUM 5000 UNITS: 5000 INJECTION INTRAVENOUS; SUBCUTANEOUS at 06:00

## 2021-06-25 RX ADMIN — Medication 10 ML: at 08:54

## 2021-06-25 RX ADMIN — SODIUM CHLORIDE: 9 INJECTION, SOLUTION INTRAVENOUS at 18:05

## 2021-06-25 RX ADMIN — PIPERACILLIN SODIUM AND TAZOBACTAM SODIUM 3375 MG: 3; .375 INJECTION, POWDER, LYOPHILIZED, FOR SOLUTION INTRAVENOUS at 22:50

## 2021-06-25 RX ADMIN — FENTANYL CITRATE 25 MCG: 50 INJECTION, SOLUTION INTRAMUSCULAR; INTRAVENOUS at 14:39

## 2021-06-25 RX ADMIN — INSULIN LISPRO 2 UNITS: 100 INJECTION, SOLUTION INTRAVENOUS; SUBCUTANEOUS at 18:06

## 2021-06-25 RX ADMIN — HYDRALAZINE HYDROCHLORIDE 10 MG: 10 TABLET, FILM COATED ORAL at 08:53

## 2021-06-25 ASSESSMENT — PAIN SCALES - GENERAL
PAINLEVEL_OUTOF10: 7
PAINLEVEL_OUTOF10: 3
PAINLEVEL_OUTOF10: 7
PAINLEVEL_OUTOF10: 6
PAINLEVEL_OUTOF10: 6
PAINLEVEL_OUTOF10: 4
PAINLEVEL_OUTOF10: 8
PAINLEVEL_OUTOF10: 2
PAINLEVEL_OUTOF10: 3
PAINLEVEL_OUTOF10: 7
PAINLEVEL_OUTOF10: 3
PAINLEVEL_OUTOF10: 7

## 2021-06-25 ASSESSMENT — PAIN DESCRIPTION - FREQUENCY: FREQUENCY: CONTINUOUS

## 2021-06-25 ASSESSMENT — PAIN DESCRIPTION - PAIN TYPE: TYPE: ACUTE PAIN;CHRONIC PAIN

## 2021-06-25 ASSESSMENT — PAIN DESCRIPTION - ONSET: ONSET: GRADUAL

## 2021-06-25 ASSESSMENT — PAIN - FUNCTIONAL ASSESSMENT: PAIN_FUNCTIONAL_ASSESSMENT: ACTIVITIES ARE NOT PREVENTED

## 2021-06-25 ASSESSMENT — PAIN DESCRIPTION - DESCRIPTORS: DESCRIPTORS: ACHING;DISCOMFORT

## 2021-06-25 ASSESSMENT — PAIN DESCRIPTION - LOCATION: LOCATION: ABDOMEN

## 2021-06-25 ASSESSMENT — PAIN DESCRIPTION - PROGRESSION: CLINICAL_PROGRESSION: NOT CHANGED

## 2021-06-25 NOTE — PROGRESS NOTES
1235)    sodium chloride 25 mL (06/23/21 1633)    sodium chloride Stopped (06/22/21 0804)    dextrose 5 % and 0.9 % NaCl Stopped (06/22/21 1235)    sodium chloride Stopped (06/25/21 0739)     PRN Meds:.fentanNYL, sodium chloride, glucose, dextrose, glucagon (rDNA), dextrose, sodium chloride flush, sodium chloride, promethazine **OR** ondansetron, acetaminophen **OR** acetaminophen, polyethylene glycol, albuterol, oxyCODONE-acetaminophen, heparin flush    DATA:    CBC:   Lab Results   Component Value Date    WBC 6.8 06/25/2021    RBC 2.40 06/25/2021    HGB 7.4 06/25/2021    HCT 21.9 06/25/2021    MCV 91.3 06/25/2021    MCH 30.8 06/25/2021    MCHC 33.8 06/25/2021    RDW 13.9 06/25/2021     06/25/2021    MPV 9.2 06/25/2021     CMP:    Lab Results   Component Value Date     06/25/2021    K 3.7 06/25/2021    K 4.0 06/22/2021     06/25/2021    CO2 27 06/25/2021    BUN 25 06/25/2021    CREATININE 6.7 06/25/2021    GFRAA 9 06/25/2021    LABGLOM 9 06/25/2021    GLUCOSE 169 06/25/2021    GLUCOSE 130 05/18/2012    PROT 5.5 06/25/2021    LABALBU 4.0 06/25/2021    LABALBU 4.1 05/18/2012    CALCIUM 8.9 06/25/2021    BILITOT 0.3 06/25/2021    ALKPHOS 95 06/25/2021    AST 12 06/25/2021    ALT 8 06/25/2021     Magnesium:    Lab Results   Component Value Date    MG 2.2 06/25/2021     Phosphorus:    Lab Results   Component Value Date    PHOS 2.9 06/25/2021     Radiology Review:      Chest x-ray June 21, 2021   No acute process.             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   In the emergency room she was found to

## 2021-06-25 NOTE — PROGRESS NOTES
Physical Therapy Treatment Note/Plan of Care    Room #:  6693/8785-87  Patient Name: Jp Jiménez  YOB: 1990  MRN: [id number]    Date of Service: 6/25/2021     Tentative placement recommendation: Home    Equipment recommendation: Patient has needed equipment       Evaluating Physical Therapist: Aidan Earl Arm Physical Therapist    Specific Provider Orders/Date/Referring Provider :  06/22/21 1030   PT eval and treat Start: 06/22/21 1030, End: 06/22/21 1030, ONE TIME, Standing Count: 1 Occurrences, [place] MD    Admitting Diagnosis:   DKA, type 1, not at goal [facility]) [E10.10]  Hyperosmolality [E87.0]      Visit Diagnoses       Codes    Diabetic ketoacidosis without coma associated with other specified diabetes mellitus ([address]    -  Primary E13.10    Chronic kidney disease, unspecified CKD stage     N18.9    Urinary tract infection without hematuria, site unspecified     N39.0        Surgery: none    Patient Active Problem List   Diagnosis    Non compliance w medication regimen    Tobacco smoking complicating pregnancy    MTHFR mutation    Hypertension    Iron deficiency anemia    Sinus tachycardia    Bladder dysfunction    Hypokalemia    Severe protein-calorie malnutrition ([address]    Uncontrolled type 1 diabetes mellitus with hyperglycemia, with long-term current use of insulin ([address]    ESRD on peritoneal dialysis ([address]    Hypothyroidism    Hyperlipidemia    Acute cystitis    Nondisplaced fracture of neck of fifth metacarpal bone, left hand, initial encounter for closed fracture    Chronic right-sided low back pain    Endocarditis    Seizure ([address]    Hyperkalemia    Hypomagnesemia    Hypocalcemia    Intractable nausea and vomiting    Wound of left leg, sequela    Syncope    DKA, type 1, not at goal [facility])    Hyperosmolality    Major depressive disorder      ASSESSMENT of Current Deficits Patient exhibits decreased strength and pain 8/10 globally impairing tolerance to activity. Patient was willing to work with therapy after being medicated. Slow dianne. Patient requires continued skilled physical therapy to address concerns listed above for increased safety and function at discharge. PHYSICAL THERAPY  PLAN OF CARE     Physical therapy plan of care is established based on physician order,  patient diagnosis and clinical assessment    Current Treatment Recommendations:    -Gait: Gait training   -Endurance: Utilize Supervised activities to increase level of endurance to allow for safe functional mobility including transfers and gait   -Stairs: Stair training with instruction on proper technique and hand placement on rail    PT long term treatment goals are located in below grid    Patient and or family understand(s) diagnosis, prognosis, and plan of care. Frequency of treatments: Patient will be seen  2-3 times/week.        Prior Level of Function: Patient ambulated independently with wheelchair for distance  Rehab Potential: good    for baseline    Past medical history:   Past Medical History:   Diagnosis Date    Acute congestive heart failure ([address]     BC (acute kidney injury) ([address] 10/1/2019    Cardiac arrest ([address] 2/15/2021    Cephalgia 10/9/2019    Chronic kidney disease     Depression     Diabetes mellitus ([address]     Diabetic gastroparesis associated with type 1 diabetes mellitus ([address] 12/17/2018    Diabetic ketoacidosis ([address] 8/27/2011    Diabetic ketoacidosis with coma associated with type 1 diabetes mellitus ([address] 6/26/2013    Diabetic polyneuropathy associated with type 1 diabetes mellitus ([address] 12/27/2020    Drug use complicating pregnancy in third trimester     Endocarditis 10/31/2020    ESRD (end stage renal disease) ([address] 9/29/2020    Hemodialysis patient ([address]     History of blood transfusion 11/2019    Hyperlipidemia 10/8/2020    Hyperosmolar hyperglycemic state (HHS) ([address] 11/20/2020    Hypothyroidism 10/8/2020    Iron deficiency anemia 10/1/2019    MDRO (multiple drug resistant organisms) resistance     MRSA (methicillin resistant Staphylococcus aureus)     back wound abcess    Non compliance w medication regimen 3/30/2016    Other disorders of kidney and ureter     Pregnancy 3/30/2016    16 weeks    Previous  delivery affecting pregnancy, antepartum 3/2/2017    Previous stillbirth or demise, antepartum 2016    Seizure ([address] 2020    Severe pre-eclampsia in third trimester 2016    Shock liver 2/15/2021    Valvular endocarditis 11/10/2020    This Diagnosis was added to the Problem List based on transcribed orders from Dr. Matteo Vo        Past Surgical History:   Procedure Laterality Date    BACK SURGERY      abscess   [address]      x2    CHOLECYSTECTOMY, LAPAROSCOPIC N/A 2019    CHOLECYSTECTOMY LAPAROSCOPIC performed by Celi Sanders MD at [facility] N/A 2018    COLONOSCOPY WITH BIOPSY performed by Colletta Platts, MD at [id number] [place] [address] COLONOSCOPY N/A 2018    COLONOSCOPY WITH BIOPSY performed by Dionne Booth MD at [address] [place] [address]  2012    EF 57%    ECHO COMPL W DOP COLOR FLOW  6/10/2013         EMBOLECTOMY N/A 2020    [facility], [id number] [place] [address], YULISSA -- REQS ROOM 3 performed by Savanna Hernandez MD at [address] [place] [address] Left 2021    LEFT LEG INCISION AND DRAINAGE, DEBRIDEMENT, WOUND VAC APPLICATION performed by Ayah Harris DPM at [address] [place] [address] Left 2021    LEFT LEG DEBRIDEMENT BIOPSY POSS APPLICATION WOUND VAC performed by Ayah Harris DPM at [place] [address] N/A 2020    LAPAROSCOPIC INSERTION PERITONEAL DIALYSIS CATHETER performed by Moose Livingston MD at [facility] ESOPHAGOGASTRODUODENOSCOPY TRANSORAL DIAGNOSTIC N/A 2018    EGD ESOPHAGOGASTRODUODENOSCOPY performed by Colletta Platts, MD at [address] [place] [address] TRANSESOPHAGEAL ECHOCARDIOGRAM N/A 10/19/2020    TRANSESOPHAGEAL ECHOCARDIOGRAM WITH BUBBLE STUDY performed by Rogelio Gresham MD at [address] [facility] TUNNELED VENOUS PORT PLACEMENT  04/2018    UPPER GASTROINTESTINAL ENDOSCOPY  12/18/2018    EGD BIOPSY performed by Viktoriya Roldan MD at [facility] N/A 10/11/2019    EGD ESOPHAGOGASTRODUODENOSCOPY performed by Joy Young DO at [address] [place] [address] [place]:  Precautions: Up as tolerated, falls and alarm ,    Social history: Patient lives with children 4/almost 4 yo 10 months apart in a apartment 2nd floor  with 6 steps  to enter with [address] shower     Equipment owned: Wheelchair and [address] chair,      [address]   How much difficulty turning over in bed?: None  How much difficulty sitting down on / standing up from a chair with arms?: A Little  How much difficulty moving from lying on back to sitting on side of bed?: None  How much help from another person moving to and from a bed to a chair?: A Little  How much help from another person needed to walk in hospital room?: A Little  How much help from another person for climbing 3-5 steps with a railing?: A Little  AM-PAC Inpatient Mobility Raw Score : 20  AM-PAC Inpatient T-Scale Score : 47.67  Mobility Inpatient CMS 0-100% Score: 35.83  Mobility Inpatient CMS G-Code Modifier : [address] cleared patient for PT treatment. OBJECTIVE;   Initial Evaluation  Date: 6/22/2021 Treatment Date:   6/25/2021     Short Term/ Long Term   Goals   Was pt agreeable to Eval/treatment? Yes   Yes after pain med To be met in 3 days   Pain level   7/10  global 8/10 back and lower abdomen    Bed Mobility    Rolling: Supervision     Supine to sit: Supervision     Sit to supine: Not assessed     Scooting: Supervision    Rolling: Independent    Supine to sit:  Independent    Sit to supine: Independent    Scooting: Independent    Rolling: Independent Supine to sit: Independent    Sit to supine: Independent    Scooting: Independent     Transfers Sit to stand: Supervision  with therapist for line management   Sit to stand: Supervision       Sit to stand: Independent     Ambulation    3-4 steps using  hand held assist with Minimal assist of 1     40 feet using  IV pole with Supervision       Slow dianne  50 feet using  no device with Independent    Stair negotiation: ascended and descended   Not assessed       6 steps with railing to simulate home environment   ROM Within functional limits         Strength BUE:  refer to OT eval  RLE:  3+/5  LLE:  3+/5   Increase strength in affected mm groups by 1/3 grade   Balance Sitting EOB:  good    Dynamic Standing:  fair   Sitting EOB: good    Dynamic Standing: fair +   Sitting EOB:  good    Dynamic Standing: good       Patient is Alert & Oriented x person, place, time and situation and follows directions    Sensation:  Patient  denies numbness/tingling     Edema:  yes bilateral lower extremities    Endurance: fair       Vitals: room air    Blood Pressure at rest    Blood Pressure during session      Heart Rate at rest   Heart Rate during session     SPO2 at rest  %  SPO2 during session 98-99%     Patient education  Patient educated on role of Physical Therapy, risks of immobility, safety and plan of care and  importance of mobility while in hospital       Patient response to education:   Pt verbalized understanding Pt demonstrated skill Pt requires further education in this area   Yes Partial Yes      Treatment:  Patient practiced and was instructed/facilitated in the following treatment: Patient transferred edge of bed    Sat edge of bed 15 minutes with Supervision  to increase dynamic sitting balance and activity tolerance. performed exercise, ambulated in room, returned seated . edge of bed with remainder of lunch tray.         Therapeutic Exercises:  ankle pumps and long arc quad x 15      At end of session, patient sitting edge of bed  with   call light and phone within reach,   all lines and tubes intact, nursing notified. Patient would benefit from continued skilled Physical Therapy to improve functional independence and quality of life. Patient's/ family goals   home        Time in  [phone]  Time out  1449    Total Treatment Time 23  minutes    Evaluation time includes thorough review of current medical information, gathering information on past medical history/social history and prior level of function, completion of standardized testing/informal observation of tasks, assessment of data, and development of Plan of care and goals.      CPT codes:    Therapeutic activities (54120)   10 minutes  1 unit(s)  Gait Training (15962) 13 minutes 1 unit(s)    Torito Kern, PT

## 2021-06-25 NOTE — PROGRESS NOTES
[address] [place], [place] [address]ist   Progress Note    Admitting Date and Time: 6/21/2021 10:37 PM  Admit Dx: DKA, type 1, not at goal [facility]) [E10.10]  Hyperosmolality [E87.0]    Subjective/interval history:    Patient complaining of severe lower abdominal pain as well as 5 episodes of watery diarrhea a day. Denies any nausea or vomiting tolerating oral intake well  ROS: denies fever, chills, cp, sob, n/v, HA unless stated above.      famotidine (PEPCID) injection  10 mg Intravenous Daily    sodium chloride flush  10 mL Intravenous 2 times per day    sodium chloride  15 mL/kg Intravenous Once    heparin (porcine)  5,000 Units Subcutaneous 3 times per day    metoclopramide  5 mg Oral TID    rOPINIRole  0.25 mg Oral Nightly    mirtazapine  15 mg Oral Nightly    hydrOXYzine  25 mg Oral Daily    ARIPiprazole  5 mg Oral Nightly    sevelamer  800 mg Oral TID WC    hydrALAZINE  10 mg Oral BID    metoprolol tartrate  25 mg Oral Daily    pantoprazole  40 mg Oral QAM AC    levothyroxine  75 mcg Oral Daily    folic acid  1 mg Oral Daily    piperacillin-tazobactam  3,375 mg Intravenous Q12H    insulin glargine  7 Units Subcutaneous Daily    insulin lispro  0-12 Units Subcutaneous TID WC    insulin lispro  0-6 Units Subcutaneous Nightly    gentamicin   Topical Daily    sodium chloride flush  5-40 mL Intravenous BID     fentanNYL, 25 mcg, Q1H PRN  sodium chloride, 250 mL, PRN  glucose, 15 g, PRN  dextrose, 12.5 g, PRN  glucagon (rDNA), 1 mg, PRN  dextrose, 100 mL/hr, PRN  sodium chloride flush, 10 mL, PRN  sodium chloride, 25 mL, PRN  promethazine, 12.5 mg, Q6H PRN   Or  ondansetron, 4 mg, Q6H PRN  acetaminophen, 650 mg, Q6H PRN   Or  acetaminophen, 650 mg, Q6H PRN  polyethylene glycol, 17 g, Daily PRN  albuterol, 2.5 mg, Q6H PRN  oxyCODONE-acetaminophen, 1 tablet, Q4H PRN  heparin flush, 100 Units, PRN         Objective:    BP (!) 150/88   Pulse 102   Temp 97.5 °F (36.4 °C)   Resp 16   Ht 5' 4\" (1.626 m)   Wt 138 lb (62.6 kg)   LMP 02/21/2021   SpO2 99%   BMI 23.69 kg/m²   General Appearance: Alert and oriented x3. In no acute distress  Skin: warm and dry  Head: normocephalic and atraumatic  Eyes: pupils equal, round, and reactive to light, extraocular eye movements intact, conjunctivae normal  Neck: neck supple and non tender without mass   Pulmonary/Chest: Nonlabored on room air. Clear to auscultation bilaterally. Cardiovascular: normal rate, normal S1 and S2 and no carotid bruits  Abdomen: soft, some tenderness without peritoneal signs, in the left mid quadrant non-distended, normal bowel sounds, no masses or organomegaly. PD catheter in place, no erythema, no flank pain  Extremities: no cyanosis, no clubbing and no edema, left leg scar, no open wound  Neurologic: no cranial nerve deficit and speech normal      Recent Labs     06/23/21  0645 06/24/21  0540 06/25/21  0425   * 139 139   K 4.3 3.5 3.7   CL 96* 100 101   CO2 26 27 27   BUN 37* 26* 25*   CREATININE 8.0* 6.9* 6.7*   GLUCOSE 260* 168* 169*   CALCIUM 7.8* 8.6 8.9       Recent Labs     06/23/21  0645 06/24/21  0540 06/25/21  0425   ALKPHOS 147* 107* 95   PROT 4.7* 5.1* 5.5*   LABALBU 2.4* 3.3* 4.0   BILITOT <0.2 0.2 0.3   AST 16 9 12   ALT 13 8 8       Recent Labs     06/23/21  0645 06/23/21  1450 06/24/21  0540 06/25/21  0425   WBC 5.3  --  6.8 6.8   RBC 2.09*  --  2.52* 2.40*   HGB 6.4* 7.7* 7.7* 7.4*   HCT 19.5* 23.7* 22.7* 21.9*   MCV 93.3  --  90.1 91.3   MCH 30.6  --  30.6 30.8   MCHC 32.8  --  33.9 33.8   RDW 14.1  --  13.4 13.9     --  197 186   MPV 10.0  --  9.8 9.2       Radiology:   CT ABDOMEN PELVIS WO CONTRAST Additional Contrast? Oral   Final Result   Circumferential wall thickening of the urinary bladder, consistent with the   patient's known diagnosis of acute cystitis. Otherwise, no evidence of an   inflammatory or obstructive process in the abdomen or pelvis to explain pain.       Moderate volume of free fluid throughout the abdomen/pelvis, likely related   to dialysate, given the peritoneal dialysis catheter in the deep pelvis. Nonspecific patchy bilateral ground-glass opacities at the lung bases. These   are nonspecific and may be on the basis of an infectious or inflammatory   pneumonitis. XR CHEST PORTABLE   Final Result   No acute process. Assessment/Plan:  Principal Problem:    DKA, type 1, not at goal [facility])  Active Problems:    Hypertension    Uncontrolled type 1 diabetes mellitus with hyperglycemia, with long-term current use of insulin ([facility]    ESRD on peritoneal dialysis ([facility]    Acute cystitis    Hyperosmolality    Major depressive disorder  Resolved Problems:    * No resolved hospital problems. *      1. HHS/possible early DKA in the setting of uncontrolled type 1 diabetes mellitus  -resolved with IV fluids and insulin drip protocol  -Continue subcutaneous insulin regimen same , bs controlled fair now    2. Urinary tract infection/acute cystitis  -Ceftriaxone changed to Zosyn given Enterococcus infection in the past  -Urine culture is gram-positive organisms;     3.  Abdominal pain and diarrhea  -Given the fact that she receives frequent IV antibiotics,  C. Difficile pending.   - CT of the abdomen pelvis no acute changes some thickening in the bladder due to cystitis, also fluid in the peritoneum from a peritoneal dialysis ,   She stated Percocet is not working and fentanyl is working better    4. End-stage renal disease on peritoneal dialysis  -Nephrology following for PD management    5. Uncontrolled type 1 diabetes mellitu  Off insulin drip; continue subcutaneous insulin regimen    6. Essential hypertension  -Continue home hydralazine and metoprolol tartrate, blood pressure fair    7. Depression  -Continue home Abilify and Remeron    8.   Hypothyroidism, continue Synthroid    DVT prophylaxis: Subcutaneous heparin  CODE STATUS: Full code       NOTE: This report was transcribed using voice recognition software. Every effort was made to ensure accuracy; however, inadvertent computerized transcription errors may be present.      Electronically signed by Gia Griffin MD on 6/25/2021 at 2:57 PM

## 2021-06-25 NOTE — PROGRESS NOTES
CCPD completed. 950 UF. Tolerated well. No complaints.  Pt disconnected and stay safe cap applied using aseptic technique

## 2021-06-26 LAB
ALBUMIN SERPL-MCNC: 3.8 G/DL (ref 3.5–5.2)
ALP BLD-CCNC: 115 U/L (ref 35–104)
ALT SERPL-CCNC: 13 U/L (ref 0–32)
ANION GAP SERPL CALCULATED.3IONS-SCNC: 10 MMOL/L (ref 7–16)
AST SERPL-CCNC: 18 U/L (ref 0–31)
BASOPHILS ABSOLUTE: 0.08 E9/L (ref 0–0.2)
BASOPHILS RELATIVE PERCENT: 1.1 % (ref 0–2)
BILIRUB SERPL-MCNC: 0.2 MG/DL (ref 0–1.2)
BUN BLDV-MCNC: 30 MG/DL (ref 6–20)
CALCIUM SERPL-MCNC: 9 MG/DL (ref 8.6–10.2)
CHLORIDE BLD-SCNC: 100 MMOL/L (ref 98–107)
CO2: 29 MMOL/L (ref 22–29)
CREAT SERPL-MCNC: 6.7 MG/DL (ref 0.5–1)
EOSINOPHILS ABSOLUTE: 0.27 E9/L (ref 0.05–0.5)
EOSINOPHILS RELATIVE PERCENT: 3.8 % (ref 0–6)
GFR AFRICAN AMERICAN: 9
GFR NON-AFRICAN AMERICAN: 9 ML/MIN/1.73
GLUCOSE BLD-MCNC: 190 MG/DL (ref 74–99)
HCT VFR BLD CALC: 23.8 % (ref 34–48)
HEMOGLOBIN: 7.9 G/DL (ref 11.5–15.5)
IMMATURE GRANULOCYTES #: 0.04 E9/L
IMMATURE GRANULOCYTES %: 0.6 % (ref 0–5)
LYMPHOCYTES ABSOLUTE: 2.37 E9/L (ref 1.5–4)
LYMPHOCYTES RELATIVE PERCENT: 33.3 % (ref 20–42)
MAGNESIUM: 2.3 MG/DL (ref 1.6–2.6)
MCH RBC QN AUTO: 31.2 PG (ref 26–35)
MCHC RBC AUTO-ENTMCNC: 33.2 % (ref 32–34.5)
MCV RBC AUTO: 94.1 FL (ref 80–99.9)
METER GLUCOSE: 138 MG/DL (ref 74–99)
METER GLUCOSE: 208 MG/DL (ref 74–99)
METER GLUCOSE: 296 MG/DL (ref 74–99)
METER GLUCOSE: 61 MG/DL (ref 74–99)
METER GLUCOSE: 78 MG/DL (ref 74–99)
MONOCYTES ABSOLUTE: 0.45 E9/L (ref 0.1–0.95)
MONOCYTES RELATIVE PERCENT: 6.3 % (ref 2–12)
NEUTROPHILS ABSOLUTE: 3.9 E9/L (ref 1.8–7.3)
NEUTROPHILS RELATIVE PERCENT: 54.9 % (ref 43–80)
PDW BLD-RTO: 14.9 FL (ref 11.5–15)
PHOSPHORUS: 3.4 MG/DL (ref 2.5–4.5)
PLATELET # BLD: 202 E9/L (ref 130–450)
PMV BLD AUTO: 9.5 FL (ref 7–12)
POTASSIUM SERPL-SCNC: 4.2 MMOL/L (ref 3.5–5)
RBC # BLD: 2.53 E12/L (ref 3.5–5.5)
SODIUM BLD-SCNC: 139 MMOL/L (ref 132–146)
TOTAL PROTEIN: 5.4 G/DL (ref 6.4–8.3)
WBC # BLD: 7.1 E9/L (ref 4.5–11.5)

## 2021-06-26 PROCEDURE — 6360000002 HC RX W HCPCS: Performed by: INTERNAL MEDICINE

## 2021-06-26 PROCEDURE — 2580000003 HC RX 258: Performed by: INTERNAL MEDICINE

## 2021-06-26 PROCEDURE — 84100 ASSAY OF PHOSPHORUS: CPT

## 2021-06-26 PROCEDURE — 6370000000 HC RX 637 (ALT 250 FOR IP): Performed by: INTERNAL MEDICINE

## 2021-06-26 PROCEDURE — 6360000002 HC RX W HCPCS: Performed by: NURSE PRACTITIONER

## 2021-06-26 PROCEDURE — 99232 SBSQ HOSP IP/OBS MODERATE 35: CPT | Performed by: INTERNAL MEDICINE

## 2021-06-26 PROCEDURE — 36415 COLL VENOUS BLD VENIPUNCTURE: CPT

## 2021-06-26 PROCEDURE — 83735 ASSAY OF MAGNESIUM: CPT

## 2021-06-26 PROCEDURE — 82962 GLUCOSE BLOOD TEST: CPT

## 2021-06-26 PROCEDURE — 80053 COMPREHEN METABOLIC PANEL: CPT

## 2021-06-26 PROCEDURE — 90945 DIALYSIS ONE EVALUATION: CPT

## 2021-06-26 PROCEDURE — 1200000000 HC SEMI PRIVATE

## 2021-06-26 PROCEDURE — 85025 COMPLETE CBC W/AUTO DIFF WBC: CPT

## 2021-06-26 PROCEDURE — 2500000003 HC RX 250 WO HCPCS: Performed by: INTERNAL MEDICINE

## 2021-06-26 RX ORDER — FENTANYL CITRATE 50 UG/ML
50 INJECTION, SOLUTION INTRAMUSCULAR; INTRAVENOUS
Status: DISPENSED | OUTPATIENT
Start: 2021-06-26 | End: 2021-06-30

## 2021-06-26 RX ADMIN — Medication 10 ML: at 22:38

## 2021-06-26 RX ADMIN — SEVELAMER CARBONATE 800 MG: 800 TABLET, FILM COATED ORAL at 12:05

## 2021-06-26 RX ADMIN — METOCLOPRAMIDE HYDROCHLORIDE 5 MG: 5 TABLET ORAL at 22:29

## 2021-06-26 RX ADMIN — INSULIN LISPRO 4 UNITS: 100 INJECTION, SOLUTION INTRAVENOUS; SUBCUTANEOUS at 08:21

## 2021-06-26 RX ADMIN — Medication 10 ML: at 10:18

## 2021-06-26 RX ADMIN — OXYCODONE AND ACETAMINOPHEN 1 TABLET: 5; 325 TABLET ORAL at 04:12

## 2021-06-26 RX ADMIN — OXYCODONE AND ACETAMINOPHEN 1 TABLET: 5; 325 TABLET ORAL at 17:03

## 2021-06-26 RX ADMIN — SEVELAMER CARBONATE 800 MG: 800 TABLET, FILM COATED ORAL at 18:25

## 2021-06-26 RX ADMIN — METOCLOPRAMIDE HYDROCHLORIDE 5 MG: 5 TABLET ORAL at 14:49

## 2021-06-26 RX ADMIN — PIPERACILLIN SODIUM AND TAZOBACTAM SODIUM 3375 MG: 3; .375 INJECTION, POWDER, LYOPHILIZED, FOR SOLUTION INTRAVENOUS at 12:04

## 2021-06-26 RX ADMIN — Medication 10 ML: at 12:10

## 2021-06-26 RX ADMIN — PIPERACILLIN SODIUM AND TAZOBACTAM SODIUM 3375 MG: 3; .375 INJECTION, POWDER, LYOPHILIZED, FOR SOLUTION INTRAVENOUS at 23:50

## 2021-06-26 RX ADMIN — OXYCODONE AND ACETAMINOPHEN 1 TABLET: 5; 325 TABLET ORAL at 09:35

## 2021-06-26 RX ADMIN — FAMOTIDINE 10 MG: 10 INJECTION INTRAVENOUS at 09:18

## 2021-06-26 RX ADMIN — FENTANYL CITRATE 50 MCG: 50 INJECTION, SOLUTION INTRAMUSCULAR; INTRAVENOUS at 18:22

## 2021-06-26 RX ADMIN — SEVELAMER CARBONATE 800 MG: 800 TABLET, FILM COATED ORAL at 09:16

## 2021-06-26 RX ADMIN — FENTANYL CITRATE 25 MCG: 50 INJECTION, SOLUTION INTRAMUSCULAR; INTRAVENOUS at 06:20

## 2021-06-26 RX ADMIN — METOCLOPRAMIDE HYDROCHLORIDE 5 MG: 5 TABLET ORAL at 09:18

## 2021-06-26 RX ADMIN — OXYCODONE AND ACETAMINOPHEN 1 TABLET: 5; 325 TABLET ORAL at 22:29

## 2021-06-26 RX ADMIN — FOLIC ACID 1 MG: 1 TABLET ORAL at 09:17

## 2021-06-26 RX ADMIN — INSULIN LISPRO 6 UNITS: 100 INJECTION, SOLUTION INTRAVENOUS; SUBCUTANEOUS at 18:28

## 2021-06-26 RX ADMIN — HYDRALAZINE HYDROCHLORIDE 10 MG: 10 TABLET, FILM COATED ORAL at 09:17

## 2021-06-26 RX ADMIN — HEPARIN SODIUM 5000 UNITS: 5000 INJECTION INTRAVENOUS; SUBCUTANEOUS at 14:40

## 2021-06-26 RX ADMIN — SODIUM CHLORIDE: 9 INJECTION, SOLUTION INTRAVENOUS at 16:44

## 2021-06-26 RX ADMIN — HEPARIN SODIUM 5000 UNITS: 5000 INJECTION INTRAVENOUS; SUBCUTANEOUS at 22:38

## 2021-06-26 RX ADMIN — HYDROXYZINE HYDROCHLORIDE 25 MG: 25 TABLET ORAL at 09:17

## 2021-06-26 RX ADMIN — MIRTAZAPINE 15 MG: 15 TABLET, FILM COATED ORAL at 22:28

## 2021-06-26 RX ADMIN — FENTANYL CITRATE 25 MCG: 50 INJECTION, SOLUTION INTRAMUSCULAR; INTRAVENOUS at 01:16

## 2021-06-26 RX ADMIN — LEVOTHYROXINE SODIUM 75 MCG: 75 TABLET ORAL at 06:44

## 2021-06-26 RX ADMIN — FENTANYL CITRATE 25 MCG: 50 INJECTION, SOLUTION INTRAMUSCULAR; INTRAVENOUS at 12:05

## 2021-06-26 RX ADMIN — PANTOPRAZOLE SODIUM 40 MG: 40 TABLET, DELAYED RELEASE ORAL at 06:44

## 2021-06-26 RX ADMIN — INSULIN GLARGINE 7 UNITS: 100 INJECTION, SOLUTION SUBCUTANEOUS at 09:20

## 2021-06-26 RX ADMIN — ARIPIPRAZOLE 5 MG: 5 TABLET ORAL at 22:28

## 2021-06-26 RX ADMIN — METOPROLOL TARTRATE 25 MG: 25 TABLET, FILM COATED ORAL at 09:16

## 2021-06-26 RX ADMIN — ROPINIROLE HYDROCHLORIDE 0.25 MG: 0.25 TABLET, FILM COATED ORAL at 22:29

## 2021-06-26 RX ADMIN — GENTAMICIN SULFATE: 1 CREAM TOPICAL at 12:13

## 2021-06-26 ASSESSMENT — PAIN DESCRIPTION - PAIN TYPE
TYPE: CHRONIC PAIN
TYPE: CHRONIC PAIN

## 2021-06-26 ASSESSMENT — PAIN DESCRIPTION - FREQUENCY: FREQUENCY: CONTINUOUS

## 2021-06-26 ASSESSMENT — PAIN DESCRIPTION - PROGRESSION
CLINICAL_PROGRESSION: NOT CHANGED
CLINICAL_PROGRESSION: NOT CHANGED

## 2021-06-26 ASSESSMENT — PAIN DESCRIPTION - LOCATION
LOCATION: ABDOMEN

## 2021-06-26 ASSESSMENT — PAIN SCALES - GENERAL
PAINLEVEL_OUTOF10: 5
PAINLEVEL_OUTOF10: 8
PAINLEVEL_OUTOF10: 8
PAINLEVEL_OUTOF10: 6
PAINLEVEL_OUTOF10: 6
PAINLEVEL_OUTOF10: 5
PAINLEVEL_OUTOF10: 6
PAINLEVEL_OUTOF10: 7
PAINLEVEL_OUTOF10: 9
PAINLEVEL_OUTOF10: 8

## 2021-06-26 ASSESSMENT — PAIN DESCRIPTION - DESCRIPTORS: DESCRIPTORS: ACHING;CONSTANT;DISCOMFORT

## 2021-06-26 ASSESSMENT — PAIN DESCRIPTION - ORIENTATION: ORIENTATION: LEFT;RIGHT;MID

## 2021-06-26 NOTE — FLOWSHEET NOTE
CCPD initiated at this time. Drain and fill series completed w/o difficulty. Effluent by- product pale translucent pale yellow. Aseptic technique maintained t/o procedure. Tolerated well.

## 2021-06-26 NOTE — PROGRESS NOTES
Department of Internal Medicine  Nephrology Attending Progress Note    Events reviewed. SUBJECTIVE: We are following [address] [place] [address] for ESRD on peritoneal dialysis. Reports feeling better.     PHYSICAL EXAM:      Vitals:    VITALS:  BP (!) 156/88   Pulse 102   Temp 98.1 °F (36.7 °C) (Oral)   Resp 18 Comment: 18  Ht 5' 4\" (1.626 m)   Wt 138 lb (62.6 kg)   LMP 02/21/2021   SpO2 97%   BMI 23.69 kg/m²   24HR INTAKE/OUTPUT:      Intake/Output Summary (Last 24 hours) at 6/26/2021 1432  Last data filed at 6/26/2021 0700  Gross per 24 hour   Intake 730 ml   Output 511 ml   Net 219 ml       PD Catheter Exam:  PD catheter exit site clean    Constitutional: Awake in no acute distress  HEENT:  Normocephalic, PERRL  Respiratory: Decreased breath sounds on the basis  Cardiovascular/Edema:  RRR, S1/S2  Gastrointestinal:  Soft, PD catheter exit site clean   Neurologic:  Nonfocal, AVELAR  Skin:  Warm, dry, no lesions  Other:  no edema    Scheduled Meds:   famotidine (PEPCID) injection  10 mg Intravenous Daily    sodium chloride flush  10 mL Intravenous 2 times per day    sodium chloride  15 mL/kg Intravenous Once    heparin (porcine)  5,000 Units Subcutaneous 3 times per day    metoclopramide  5 mg Oral TID    rOPINIRole  0.25 mg Oral Nightly    mirtazapine  15 mg Oral Nightly    hydrOXYzine  25 mg Oral Daily    ARIPiprazole  5 mg Oral Nightly    sevelamer  800 mg Oral TID WC    hydrALAZINE  10 mg Oral BID    metoprolol tartrate  25 mg Oral Daily    pantoprazole  40 mg Oral QAM AC    levothyroxine  75 mcg Oral Daily    folic acid  1 mg Oral Daily    piperacillin-tazobactam  3,375 mg Intravenous Q12H    insulin glargine  7 Units Subcutaneous Daily    insulin lispro  0-12 Units Subcutaneous TID WC    insulin lispro  0-6 Units Subcutaneous Nightly    gentamicin   Topical Daily    sodium chloride flush  5-40 mL Intravenous BID     Continuous Infusions:   sodium chloride      dextrose      insulin Stopped (06/22/21 1235)    sodium chloride 25 mL (06/23/21 1633)    sodium chloride Stopped (06/22/21 0804)    dextrose 5 % and 0.9 % NaCl Stopped (06/22/21 1235)    sodium chloride Stopped (06/25/21 2011)     PRN Meds:.fentanNYL, sodium chloride, glucose, dextrose, glucagon (rDNA), dextrose, sodium chloride flush, sodium chloride, promethazine **OR** ondansetron, acetaminophen **OR** acetaminophen, polyethylene glycol, albuterol, oxyCODONE-acetaminophen, heparin flush    DATA:    CBC:   Lab Results   Component Value Date    WBC 7.1 06/26/2021    RBC 2.53 06/26/2021    HGB 7.9 06/26/2021    HCT 23.8 06/26/2021    MCV 94.1 06/26/2021    MCH 31.2 06/26/2021    MCHC 33.2 06/26/2021    RDW 14.9 06/26/2021     06/26/2021    MPV 9.5 06/26/2021     CMP:    Lab Results   Component Value Date     06/26/2021    K 4.2 06/26/2021    K 4.0 06/22/2021     06/26/2021    CO2 29 06/26/2021    BUN 30 06/26/2021    CREATININE 6.7 06/26/2021    GFRAA 9 06/26/2021    LABGLOM 9 06/26/2021    GLUCOSE 190 06/26/2021    GLUCOSE 130 05/18/2012    PROT 5.4 06/26/2021    LABALBU 3.8 06/26/2021    LABALBU 4.1 05/18/2012    CALCIUM 9.0 06/26/2021    BILITOT 0.2 06/26/2021    ALKPHOS 115 06/26/2021    AST 18 06/26/2021    ALT 13 06/26/2021     Magnesium:    Lab Results   Component Value Date    MG 2.3 06/26/2021     Phosphorus:    Lab Results   Component Value Date    PHOS 3.4 06/26/2021     Radiology Review:      Chest x-ray June 21, 2021   No acute process.             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   In the emergency room she was found to have a glucose level of 874, beta hydroxybutyrate of 3.94. She was admitted to MICU. IMPRESSION/RECOMMENDATIONS:      1. ESRD on peritoneal dialysis, to continue CCPD 4 exchanges over 10 hours of 1.5% with long dwell of 2.5%  2. HTN, on hydralazine 10 mg twice daily and metoprolol 25 mg daily  3. UTI, on piperacillin-tazobactam  4. MBD of CKD, on sevelamer  5. Anemia of CKD, on epoetin alpha  6.  Severe hypoalbuminemia/malnutrition    Plan:    · Continue CCPD 4 exchanges over 10 hours of 1.5% with long dwell of 2.5%  · Continue to monitor electrolytes  · Continue epoetin alpha 3000 units 3 times a week

## 2021-06-26 NOTE — PROGRESS NOTES
CCPD initiated using aseptic technique. Dressing changed. VSS. Pt denies pain.  Draining pale yellow effluent

## 2021-06-26 NOTE — PROGRESS NOTES
[address] [place] [address] Hospitalist   Progress Note    Admitting Date and Time: 6/21/2021 10:37 PM  Admit Dx: DKA, type 1, not at goal [facility]) [E10.10]  Hyperosmolality [E87.0]    Subjective/interval history:    Abdominal pain has improved, but still persists. Still has significant dysuria. Urine culture showing less than 10,000 CFU/mL of mixed yulisa. CT of the abdomen pelvis showed thickening of the urinary bladder but no other significant findings. ROS: denies fever, chills, cp, sob, n/v, HA unless stated above.      [START ON 6/28/2021] epoetin nena-epbx  3,000 Units Subcutaneous Once per day on Mon Wed Fri    famotidine (PEPCID) injection  10 mg Intravenous Daily    sodium chloride flush  10 mL Intravenous 2 times per day    sodium chloride  15 mL/kg Intravenous Once    heparin (porcine)  5,000 Units Subcutaneous 3 times per day    metoclopramide  5 mg Oral TID    rOPINIRole  0.25 mg Oral Nightly    mirtazapine  15 mg Oral Nightly    hydrOXYzine  25 mg Oral Daily    ARIPiprazole  5 mg Oral Nightly    sevelamer  800 mg Oral TID WC    hydrALAZINE  10 mg Oral BID    metoprolol tartrate  25 mg Oral Daily    pantoprazole  40 mg Oral QAM AC    levothyroxine  75 mcg Oral Daily    folic acid  1 mg Oral Daily    piperacillin-tazobactam  3,375 mg Intravenous Q12H    insulin glargine  7 Units Subcutaneous Daily    insulin lispro  0-12 Units Subcutaneous TID WC    insulin lispro  0-6 Units Subcutaneous Nightly    gentamicin   Topical Daily    sodium chloride flush  5-40 mL Intravenous BID     fentanNYL, 25 mcg, Q1H PRN  sodium chloride, 250 mL, PRN  glucose, 15 g, PRN  dextrose, 12.5 g, PRN  glucagon (rDNA), 1 mg, PRN  dextrose, 100 mL/hr, PRN  sodium chloride flush, 10 mL, PRN  sodium chloride, 25 mL, PRN  promethazine, 12.5 mg, Q6H PRN   Or  ondansetron, 4 mg, Q6H PRN  acetaminophen, 650 mg, Q6H PRN   Or  acetaminophen, 650 mg, Q6H PRN  polyethylene glycol, 17 g, Daily PRN  albuterol, 2.5 mg, Q6H PRN  oxyCODONE-acetaminophen, 1 tablet, Q4H PRN  heparin flush, 100 Units, PRN         Objective:    BP (!) 156/88   Pulse 102   Temp 98.1 °F (36.7 °C) (Oral)   Resp 18 Comment: 18  Ht 5' 4\" (1.626 m)   Wt 138 lb (62.6 kg)   LMP 02/21/2021   SpO2 97%   BMI 23.69 kg/m²   General Appearance: Alert and oriented x3. In no acute distress  Skin: warm and dry  Head: normocephalic and atraumatic  Eyes: pupils equal, round, and reactive to light, extraocular eye movements intact, conjunctivae normal  Neck: neck supple and non tender without mass   Pulmonary/Chest: Nonlabored on room air. Clear to auscultation bilaterally. Cardiovascular: normal rate, normal S1 and S2 and no carotid bruits  Abdomen: Soft, lower abdominal tenderness without peritoneal signs, nondistended, normal bowel sounds. Peritoneal dialysis catheter in place. No palpable organomegaly.   No costovertebral angle tenderness Extremities: no cyanosis, no clubbing and no edema, left leg scar from prior ulcerating wound but no open wound  Neurologic: no cranial nerve deficit and speech normal      Recent Labs     06/24/21  0540 06/25/21  0425 06/26/21  0630    139 139   K 3.5 3.7 4.2    101 100   CO2 27 27 29   BUN 26* 25* 30*   CREATININE 6.9* 6.7* 6.7*   GLUCOSE 168* 169* 190*   CALCIUM 8.6 8.9 9.0       Recent Labs     06/24/21  0540 06/25/21  0425 06/26/21  0630   ALKPHOS 107* 95 115*   PROT 5.1* 5.5* 5.4*   LABALBU 3.3* 4.0 3.8   BILITOT 0.2 0.3 0.2   AST 9 12 18   ALT 8 8 13       Recent Labs     06/24/21  0540 06/25/21  0425 06/26/21  0630   WBC 6.8 6.8 7.1   RBC 2.52* 2.40* 2.53*   HGB 7.7* 7.4* 7.9*   HCT 22.7* 21.9* 23.8*   MCV 90.1 91.3 94.1   MCH 30.6 30.8 31.2   MCHC 33.9 33.8 33.2   RDW 13.4 13.9 14.9    186 202   MPV 9.8 9.2 9.5       Radiology:   CT ABDOMEN PELVIS WO CONTRAST Additional Contrast? Oral   Final Result   Circumferential wall thickening of the urinary bladder, consistent with the   patient's known diagnosis of acute cystitis. Otherwise, no evidence of an   inflammatory or obstructive process in the abdomen or pelvis to explain pain. Moderate volume of free fluid throughout the abdomen/pelvis, likely related   to dialysate, given the peritoneal dialysis catheter in the deep pelvis. Nonspecific patchy bilateral ground-glass opacities at the lung bases. These   are nonspecific and may be on the basis of an infectious or inflammatory   pneumonitis. XR CHEST PORTABLE   Final Result   No acute process. Assessment/Plan:  Principal Problem:    DKA, type 1, not at goal [facility])  Active Problems:    Hypertension    Uncontrolled type 1 diabetes mellitus with hyperglycemia, with long-term current use of insulin ([facility] [address]    ESRD on peritoneal dialysis ([facility] [address]    Acute cystitis    Hyperosmolality    Major depressive disorder  Resolved Problems:    * No resolved hospital problems. *      1. HHS/possible early DKA in the setting of uncontrolled type 1 diabetes mellitus  -resolved with IV fluids and insulin drip protocol  -Continue subcutaneous insulin regimen. Glucose has been reasonably controlled    2. Urinary tract infection/acute cystitis  -Ceftriaxone changed to Zosyn given Enterococcus infection in the past  -Urine culture shows mixed gram-positive organisms. Given persistent symptoms, we will repeat urinalysis and urine culture    3. Abdominal pain and diarrhea  -Given the fact that she receives frequent IV antibiotics,  C. Difficile pending.   - CT of the abdomen pelvis no acute changes some thickening in the bladder due to cystitis. There is some free fluid in the abdomen, however this is consistent with her peritoneal dialysis  -She is still having significant pain. We will increase the dose of fentanyl, but decrease the prn frequency    4. End-stage renal disease on peritoneal dialysis  -Nephrology following for PD management    5.   Uncontrolled type 1 diabetes mellitus  Off insulin drip; continue subcutaneous insulin regimen    6. Essential hypertension  -Continue home hydralazine and metoprolol tartrate. Pressure variable but reasonably controlled    7. Depression  -Continue home Abilify and Remeron    8. Hypothyroidism  -continue home levothyroxine    DVT prophylaxis: Subcutaneous heparin  CODE STATUS: Full code       NOTE: This report was transcribed using voice recognition software. Every effort was made to ensure accuracy; however, inadvertent computerized transcription errors may be present.      Electronically signed by Iftikhar Oliver DO on 6/26/2021 at 4:34 PM

## 2021-06-26 NOTE — FLOWSHEET NOTE
CCPD exchange completed at this time. No adverse effects. PD effuent clear translucent yellow.  Aseptic practice maintained t/o procedure

## 2021-06-27 LAB
ALBUMIN SERPL-MCNC: 3.6 G/DL (ref 3.5–5.2)
ALP BLD-CCNC: 133 U/L (ref 35–104)
ALT SERPL-CCNC: 26 U/L (ref 0–32)
ANION GAP SERPL CALCULATED.3IONS-SCNC: 10 MMOL/L (ref 7–16)
AST SERPL-CCNC: 43 U/L (ref 0–31)
BASOPHILS ABSOLUTE: 0.07 E9/L (ref 0–0.2)
BASOPHILS RELATIVE PERCENT: 1.1 % (ref 0–2)
BILIRUB SERPL-MCNC: <0.2 MG/DL (ref 0–1.2)
BLOOD CULTURE, ROUTINE: NORMAL
BUN BLDV-MCNC: 35 MG/DL (ref 6–20)
CALCIUM SERPL-MCNC: 9 MG/DL (ref 8.6–10.2)
CHLORIDE BLD-SCNC: 97 MMOL/L (ref 98–107)
CO2: 28 MMOL/L (ref 22–29)
CREAT SERPL-MCNC: 6.9 MG/DL (ref 0.5–1)
CULTURE, BLOOD 2: NORMAL
EOSINOPHILS ABSOLUTE: 0.29 E9/L (ref 0.05–0.5)
EOSINOPHILS RELATIVE PERCENT: 4.8 % (ref 0–6)
GFR AFRICAN AMERICAN: 9
GFR NON-AFRICAN AMERICAN: 9 ML/MIN/1.73
GLUCOSE BLD-MCNC: 454 MG/DL (ref 74–99)
HCT VFR BLD CALC: 26.1 % (ref 34–48)
HEMOGLOBIN: 8.1 G/DL (ref 11.5–15.5)
IMMATURE GRANULOCYTES #: 0.04 E9/L
IMMATURE GRANULOCYTES %: 0.7 % (ref 0–5)
LYMPHOCYTES ABSOLUTE: 2.29 E9/L (ref 1.5–4)
LYMPHOCYTES RELATIVE PERCENT: 37.6 % (ref 20–42)
MAGNESIUM: 2.1 MG/DL (ref 1.6–2.6)
MCH RBC QN AUTO: 30.3 PG (ref 26–35)
MCHC RBC AUTO-ENTMCNC: 31 % (ref 32–34.5)
MCV RBC AUTO: 97.8 FL (ref 80–99.9)
METER GLUCOSE: 176 MG/DL (ref 74–99)
METER GLUCOSE: 194 MG/DL (ref 74–99)
METER GLUCOSE: 423 MG/DL (ref 74–99)
METER GLUCOSE: 470 MG/DL (ref 74–99)
METER GLUCOSE: 87 MG/DL (ref 74–99)
MONOCYTES ABSOLUTE: 0.34 E9/L (ref 0.1–0.95)
MONOCYTES RELATIVE PERCENT: 5.6 % (ref 2–12)
NEUTROPHILS ABSOLUTE: 3.06 E9/L (ref 1.8–7.3)
NEUTROPHILS RELATIVE PERCENT: 50.2 % (ref 43–80)
PDW BLD-RTO: 16 FL (ref 11.5–15)
PHOSPHORUS: 4.5 MG/DL (ref 2.5–4.5)
PLATELET # BLD: 257 E9/L (ref 130–450)
PMV BLD AUTO: 9.5 FL (ref 7–12)
POTASSIUM SERPL-SCNC: 4.6 MMOL/L (ref 3.5–5)
RBC # BLD: 2.67 E12/L (ref 3.5–5.5)
SODIUM BLD-SCNC: 135 MMOL/L (ref 132–146)
TOTAL PROTEIN: 5.5 G/DL (ref 6.4–8.3)
WBC # BLD: 6.1 E9/L (ref 4.5–11.5)

## 2021-06-27 PROCEDURE — 6360000002 HC RX W HCPCS: Performed by: INTERNAL MEDICINE

## 2021-06-27 PROCEDURE — 6360000002 HC RX W HCPCS: Performed by: NURSE PRACTITIONER

## 2021-06-27 PROCEDURE — 2580000003 HC RX 258: Performed by: INTERNAL MEDICINE

## 2021-06-27 PROCEDURE — 83735 ASSAY OF MAGNESIUM: CPT

## 2021-06-27 PROCEDURE — 6370000000 HC RX 637 (ALT 250 FOR IP): Performed by: INTERNAL MEDICINE

## 2021-06-27 PROCEDURE — 84100 ASSAY OF PHOSPHORUS: CPT

## 2021-06-27 PROCEDURE — 90945 DIALYSIS ONE EVALUATION: CPT

## 2021-06-27 PROCEDURE — 1200000000 HC SEMI PRIVATE

## 2021-06-27 PROCEDURE — 36415 COLL VENOUS BLD VENIPUNCTURE: CPT

## 2021-06-27 PROCEDURE — 82962 GLUCOSE BLOOD TEST: CPT

## 2021-06-27 PROCEDURE — 2500000003 HC RX 250 WO HCPCS: Performed by: INTERNAL MEDICINE

## 2021-06-27 PROCEDURE — 85025 COMPLETE CBC W/AUTO DIFF WBC: CPT

## 2021-06-27 PROCEDURE — 99232 SBSQ HOSP IP/OBS MODERATE 35: CPT | Performed by: INTERNAL MEDICINE

## 2021-06-27 PROCEDURE — 80053 COMPREHEN METABOLIC PANEL: CPT

## 2021-06-27 RX ADMIN — METOPROLOL TARTRATE 25 MG: 25 TABLET, FILM COATED ORAL at 08:59

## 2021-06-27 RX ADMIN — FENTANYL CITRATE 50 MCG: 50 INJECTION, SOLUTION INTRAMUSCULAR; INTRAVENOUS at 09:11

## 2021-06-27 RX ADMIN — HEPARIN SODIUM 5000 UNITS: 5000 INJECTION INTRAVENOUS; SUBCUTANEOUS at 21:38

## 2021-06-27 RX ADMIN — FOLIC ACID 1 MG: 1 TABLET ORAL at 09:00

## 2021-06-27 RX ADMIN — LEVOTHYROXINE SODIUM 75 MCG: 75 TABLET ORAL at 06:36

## 2021-06-27 RX ADMIN — Medication 10 ML: at 09:07

## 2021-06-27 RX ADMIN — SEVELAMER CARBONATE 800 MG: 800 TABLET, FILM COATED ORAL at 13:00

## 2021-06-27 RX ADMIN — HYDRALAZINE HYDROCHLORIDE 10 MG: 10 TABLET, FILM COATED ORAL at 21:37

## 2021-06-27 RX ADMIN — HYDROXYZINE HYDROCHLORIDE 25 MG: 25 TABLET ORAL at 09:00

## 2021-06-27 RX ADMIN — PIPERACILLIN SODIUM AND TAZOBACTAM SODIUM 3375 MG: 3; .375 INJECTION, POWDER, LYOPHILIZED, FOR SOLUTION INTRAVENOUS at 11:38

## 2021-06-27 RX ADMIN — OXYCODONE AND ACETAMINOPHEN 1 TABLET: 5; 325 TABLET ORAL at 11:45

## 2021-06-27 RX ADMIN — HYDRALAZINE HYDROCHLORIDE 10 MG: 10 TABLET, FILM COATED ORAL at 09:00

## 2021-06-27 RX ADMIN — FENTANYL CITRATE 50 MCG: 50 INJECTION, SOLUTION INTRAMUSCULAR; INTRAVENOUS at 00:04

## 2021-06-27 RX ADMIN — PIPERACILLIN SODIUM AND TAZOBACTAM SODIUM 3375 MG: 3; .375 INJECTION, POWDER, LYOPHILIZED, FOR SOLUTION INTRAVENOUS at 22:40

## 2021-06-27 RX ADMIN — METOCLOPRAMIDE HYDROCHLORIDE 5 MG: 5 TABLET ORAL at 14:39

## 2021-06-27 RX ADMIN — ROPINIROLE HYDROCHLORIDE 0.25 MG: 0.25 TABLET, FILM COATED ORAL at 21:38

## 2021-06-27 RX ADMIN — FENTANYL CITRATE 50 MCG: 50 INJECTION, SOLUTION INTRAMUSCULAR; INTRAVENOUS at 16:58

## 2021-06-27 RX ADMIN — FAMOTIDINE 10 MG: 10 INJECTION INTRAVENOUS at 08:55

## 2021-06-27 RX ADMIN — FENTANYL CITRATE 50 MCG: 50 INJECTION, SOLUTION INTRAMUSCULAR; INTRAVENOUS at 19:28

## 2021-06-27 RX ADMIN — Medication 10 ML: at 21:43

## 2021-06-27 RX ADMIN — ARIPIPRAZOLE 5 MG: 5 TABLET ORAL at 21:38

## 2021-06-27 RX ADMIN — SEVELAMER CARBONATE 800 MG: 800 TABLET, FILM COATED ORAL at 09:00

## 2021-06-27 RX ADMIN — SEVELAMER CARBONATE 800 MG: 800 TABLET, FILM COATED ORAL at 17:01

## 2021-06-27 RX ADMIN — PANTOPRAZOLE SODIUM 40 MG: 40 TABLET, DELAYED RELEASE ORAL at 06:36

## 2021-06-27 RX ADMIN — METOCLOPRAMIDE HYDROCHLORIDE 5 MG: 5 TABLET ORAL at 09:00

## 2021-06-27 RX ADMIN — GENTAMICIN SULFATE: 1 CREAM TOPICAL at 09:49

## 2021-06-27 RX ADMIN — HEPARIN SODIUM 5000 UNITS: 5000 INJECTION INTRAVENOUS; SUBCUTANEOUS at 14:39

## 2021-06-27 RX ADMIN — HEPARIN SODIUM 5000 UNITS: 5000 INJECTION INTRAVENOUS; SUBCUTANEOUS at 06:36

## 2021-06-27 RX ADMIN — FENTANYL CITRATE 50 MCG: 50 INJECTION, SOLUTION INTRAMUSCULAR; INTRAVENOUS at 12:56

## 2021-06-27 RX ADMIN — MIRTAZAPINE 15 MG: 15 TABLET, FILM COATED ORAL at 21:38

## 2021-06-27 RX ADMIN — INSULIN LISPRO 12 UNITS: 100 INJECTION, SOLUTION INTRAVENOUS; SUBCUTANEOUS at 09:03

## 2021-06-27 RX ADMIN — SODIUM CHLORIDE: 9 INJECTION, SOLUTION INTRAVENOUS at 15:05

## 2021-06-27 RX ADMIN — METOCLOPRAMIDE HYDROCHLORIDE 5 MG: 5 TABLET ORAL at 21:37

## 2021-06-27 RX ADMIN — INSULIN LISPRO 1 UNITS: 100 INJECTION, SOLUTION INTRAVENOUS; SUBCUTANEOUS at 21:38

## 2021-06-27 RX ADMIN — INSULIN GLARGINE 7 UNITS: 100 INJECTION, SOLUTION SUBCUTANEOUS at 09:02

## 2021-06-27 RX ADMIN — FENTANYL CITRATE 50 MCG: 50 INJECTION, SOLUTION INTRAMUSCULAR; INTRAVENOUS at 22:40

## 2021-06-27 RX ADMIN — Medication 10 ML: at 21:42

## 2021-06-27 RX ADMIN — INSULIN LISPRO 2 UNITS: 100 INJECTION, SOLUTION INTRAVENOUS; SUBCUTANEOUS at 12:04

## 2021-06-27 ASSESSMENT — PAIN SCALES - GENERAL
PAINLEVEL_OUTOF10: 7
PAINLEVEL_OUTOF10: 4
PAINLEVEL_OUTOF10: 7
PAINLEVEL_OUTOF10: 8
PAINLEVEL_OUTOF10: 8
PAINLEVEL_OUTOF10: 6
PAINLEVEL_OUTOF10: 8
PAINLEVEL_OUTOF10: 5
PAINLEVEL_OUTOF10: 9
PAINLEVEL_OUTOF10: 7
PAINLEVEL_OUTOF10: 4

## 2021-06-27 ASSESSMENT — PAIN DESCRIPTION - PROGRESSION
CLINICAL_PROGRESSION: NOT CHANGED
CLINICAL_PROGRESSION: NOT CHANGED

## 2021-06-27 ASSESSMENT — PAIN DESCRIPTION - PAIN TYPE
TYPE: CHRONIC PAIN
TYPE: CHRONIC PAIN

## 2021-06-27 ASSESSMENT — PAIN DESCRIPTION - LOCATION
LOCATION: ABDOMEN
LOCATION: ABDOMEN;BACK

## 2021-06-27 ASSESSMENT — PAIN DESCRIPTION - DESCRIPTORS: DESCRIPTORS: ACHING;CONSTANT;DISCOMFORT

## 2021-06-27 ASSESSMENT — PAIN DESCRIPTION - FREQUENCY: FREQUENCY: CONTINUOUS

## 2021-06-27 NOTE — PROGRESS NOTES
CCPD disconnected and staysafe cap applied using aseptic technique throughout procedure. 903 total UF of clear pale yellow effluent.  No fibrin noted

## 2021-06-27 NOTE — PROGRESS NOTES
[address] [place] [address] Hospitalist   Progress Note    Admitting Date and Time: 6/21/2021 10:37 PM  Admit Dx: DKA, type 1, not at goal [facility]) [E10.10]  Hyperosmolality [E87.0]    Subjective/interval history:    States her lower abdominal and pelvic pain is worse today. Requiring regular oxycodone and fentanyl. Blood glucose has been reasonably controlled. ROS: denies fever, chills, cp, sob, n/v, HA unless stated above.      [START ON 6/28/2021] epoetin nena-epbx  3,000 Units Subcutaneous Once per day on Mon Wed Fri    famotidine (PEPCID) injection  10 mg Intravenous Daily    sodium chloride flush  10 mL Intravenous 2 times per day    sodium chloride  15 mL/kg Intravenous Once    heparin (porcine)  5,000 Units Subcutaneous 3 times per day    metoclopramide  5 mg Oral TID    rOPINIRole  0.25 mg Oral Nightly    mirtazapine  15 mg Oral Nightly    hydrOXYzine  25 mg Oral Daily    ARIPiprazole  5 mg Oral Nightly    sevelamer  800 mg Oral TID WC    hydrALAZINE  10 mg Oral BID    metoprolol tartrate  25 mg Oral Daily    pantoprazole  40 mg Oral QAM AC    levothyroxine  75 mcg Oral Daily    folic acid  1 mg Oral Daily    piperacillin-tazobactam  3,375 mg Intravenous Q12H    insulin glargine  7 Units Subcutaneous Daily    insulin lispro  0-12 Units Subcutaneous TID WC    insulin lispro  0-6 Units Subcutaneous Nightly    gentamicin   Topical Daily    sodium chloride flush  5-40 mL Intravenous BID     fentanNYL, 50 mcg, Q2H PRN  sodium chloride, 250 mL, PRN  glucose, 15 g, PRN  dextrose, 12.5 g, PRN  glucagon (rDNA), 1 mg, PRN  dextrose, 100 mL/hr, PRN  sodium chloride flush, 10 mL, PRN  sodium chloride, 25 mL, PRN  promethazine, 12.5 mg, Q6H PRN   Or  ondansetron, 4 mg, Q6H PRN  acetaminophen, 650 mg, Q6H PRN   Or  acetaminophen, 650 mg, Q6H PRN  polyethylene glycol, 17 g, Daily PRN  albuterol, 2.5 mg, Q6H PRN  oxyCODONE-acetaminophen, 1 tablet, Q4H PRN  heparin flush, 100 Units, PRN Objective:    BP (!) 146/80   Pulse 94   Temp 98.3 °F (36.8 °C) (Oral)   Resp 14   Ht 5' 4\" (1.626 m)   Wt 136 lb 11 oz (62 kg)   LMP 02/21/2021   SpO2 97%   BMI 23.46 kg/m²   General Appearance: Alert and oriented x3. In no acute distress  Skin: warm and dry  Head: normocephalic and atraumatic  Eyes: pupils equal, round, and reactive to light, extraocular eye movements intact, conjunctivae normal  Neck: neck supple and non tender without mass   Pulmonary/Chest: Nonlabored on room air. Clear to auscultation bilaterally. Cardiovascular: normal rate, normal S1 and S2 and no carotid bruits  Abdomen: Soft, lower abdominal tenderness without peritoneal signs, nondistended, normal bowel sounds. Peritoneal dialysis catheter in place. No palpable organomegaly. No costovertebral angle tenderness Extremities: no cyanosis, no clubbing and no edema, left leg scar from prior ulcerating wound but no open wound  Neurologic: no cranial nerve deficit and speech normal      Recent Labs     06/25/21 0425 06/26/21  0630 06/27/21  0635    139 135   K 3.7 4.2 4.6    100 97*   CO2 27 29 28   BUN 25* 30* 35*   CREATININE 6.7* 6.7* 6.9*   GLUCOSE 169* 190* 454*   CALCIUM 8.9 9.0 9.0       Recent Labs     06/25/21 0425 06/26/21  0630 06/27/21  0635   ALKPHOS 95 115* 133*   PROT 5.5* 5.4* 5.5*   LABALBU 4.0 3.8 3.6   BILITOT 0.3 0.2 <0.2   AST 12 18 43*   ALT 8 13 26       Recent Labs     06/25/21 0425 06/26/21  0630 06/27/21  0635   WBC 6.8 7.1 6.1   RBC 2.40* 2.53* 2.67*   HGB 7.4* 7.9* 8.1*   HCT 21.9* 23.8* 26.1*   MCV 91.3 94.1 97.8   MCH 30.8 31.2 30.3   MCHC 33.8 33.2 31.0*   RDW 13.9 14.9 16.0*    202 257   MPV 9.2 9.5 9.5       Radiology:   CT ABDOMEN PELVIS WO CONTRAST Additional Contrast? Oral   Final Result   Circumferential wall thickening of the urinary bladder, consistent with the   patient's known diagnosis of acute cystitis.   Otherwise, no evidence of an   inflammatory or obstructive process in the abdomen or pelvis to explain pain. Moderate volume of free fluid throughout the abdomen/pelvis, likely related   to dialysate, given the peritoneal dialysis catheter in the deep pelvis. Nonspecific patchy bilateral ground-glass opacities at the lung bases. These   are nonspecific and may be on the basis of an infectious or inflammatory   pneumonitis. XR CHEST PORTABLE   Final Result   No acute process. Assessment/Plan:  Principal Problem:    DKA, type 1, not at goal [facility])  Active Problems:    Hypertension    Uncontrolled type 1 diabetes mellitus with hyperglycemia, with long-term current use of insulin ([facility] [address]    ESRD on peritoneal dialysis ([facility] [address]    Acute cystitis    Hyperosmolality    Major depressive disorder  Resolved Problems:    * No resolved hospital problems. *      1. HHS/possible early DKA in the setting of uncontrolled type 1 diabetes mellitus  -resolved with IV fluids and insulin drip protocol  -Continue subcutaneous insulin regimen. Glucose has been reasonably controlled    2. Recurrent urinary tract infection/acute cystitis  -Ceftriaxone changed to Zosyn given Enterococcus infection in the past  -Urine culture shows mixed gram-positive organisms. Repeat urinalysis and culture was ordered, however she only makes urine once every few days. Will consider infectious disease consult tomorrow given frequent urinary tract infections. She may need chronic prophylactic antibiotics    3. Abdominal pain and diarrhea  -Given the fact that she receives frequent IV antibiotics,  C. Difficile pending.   - CT of the abdomen pelvis no acute changes some thickening in the bladder due to cystitis. There is some free fluid in the abdomen, however this is consistent with her peritoneal dialysis  -She is still having significant pain. Dose of fentanyl increased yesterday, but frequency decreased.     4.  End-stage renal disease on peritoneal dialysis  -Nephrology following

## 2021-06-28 LAB
ALBUMIN SERPL-MCNC: 3.6 G/DL (ref 3.5–5.2)
ALP BLD-CCNC: 124 U/L (ref 35–104)
ALT SERPL-CCNC: 23 U/L (ref 0–32)
ANION GAP SERPL CALCULATED.3IONS-SCNC: 12 MMOL/L (ref 7–16)
AST SERPL-CCNC: 25 U/L (ref 0–31)
BASOPHILS ABSOLUTE: 0.09 E9/L (ref 0–0.2)
BASOPHILS RELATIVE PERCENT: 1.2 % (ref 0–2)
BILIRUB SERPL-MCNC: <0.2 MG/DL (ref 0–1.2)
BUN BLDV-MCNC: 38 MG/DL (ref 6–20)
CALCIUM SERPL-MCNC: 8.8 MG/DL (ref 8.6–10.2)
CHLORIDE BLD-SCNC: 100 MMOL/L (ref 98–107)
CO2: 26 MMOL/L (ref 22–29)
CREAT SERPL-MCNC: 6.9 MG/DL (ref 0.5–1)
EOSINOPHILS ABSOLUTE: 0.38 E9/L (ref 0.05–0.5)
EOSINOPHILS RELATIVE PERCENT: 5.3 % (ref 0–6)
GFR AFRICAN AMERICAN: 9
GFR NON-AFRICAN AMERICAN: 9 ML/MIN/1.73
GLUCOSE BLD-MCNC: 237 MG/DL (ref 74–99)
HCT VFR BLD CALC: 24.7 % (ref 34–48)
HEMOGLOBIN: 7.9 G/DL (ref 11.5–15.5)
IMMATURE GRANULOCYTES #: 0.06 E9/L
IMMATURE GRANULOCYTES %: 0.8 % (ref 0–5)
LYMPHOCYTES ABSOLUTE: 2.11 E9/L (ref 1.5–4)
LYMPHOCYTES RELATIVE PERCENT: 29.2 % (ref 20–42)
MAGNESIUM: 2.1 MG/DL (ref 1.6–2.6)
MCH RBC QN AUTO: 30.9 PG (ref 26–35)
MCHC RBC AUTO-ENTMCNC: 32 % (ref 32–34.5)
MCV RBC AUTO: 96.5 FL (ref 80–99.9)
METER GLUCOSE: 198 MG/DL (ref 74–99)
METER GLUCOSE: 206 MG/DL (ref 74–99)
METER GLUCOSE: 238 MG/DL (ref 74–99)
METER GLUCOSE: 67 MG/DL (ref 74–99)
METER GLUCOSE: 74 MG/DL (ref 74–99)
MONOCYTES ABSOLUTE: 0.44 E9/L (ref 0.1–0.95)
MONOCYTES RELATIVE PERCENT: 6.1 % (ref 2–12)
NEUTROPHILS ABSOLUTE: 4.15 E9/L (ref 1.8–7.3)
NEUTROPHILS RELATIVE PERCENT: 57.4 % (ref 43–80)
PDW BLD-RTO: 16.1 FL (ref 11.5–15)
PHOSPHORUS: 4.8 MG/DL (ref 2.5–4.5)
PLATELET # BLD: 293 E9/L (ref 130–450)
PMV BLD AUTO: 9.6 FL (ref 7–12)
POTASSIUM SERPL-SCNC: 4.2 MMOL/L (ref 3.5–5)
RBC # BLD: 2.56 E12/L (ref 3.5–5.5)
SODIUM BLD-SCNC: 138 MMOL/L (ref 132–146)
TOTAL PROTEIN: 5.4 G/DL (ref 6.4–8.3)
WBC # BLD: 7.2 E9/L (ref 4.5–11.5)

## 2021-06-28 PROCEDURE — 6360000002 HC RX W HCPCS: Performed by: INTERNAL MEDICINE

## 2021-06-28 PROCEDURE — 6360000002 HC RX W HCPCS: Performed by: NURSE PRACTITIONER

## 2021-06-28 PROCEDURE — 84100 ASSAY OF PHOSPHORUS: CPT

## 2021-06-28 PROCEDURE — 99233 SBSQ HOSP IP/OBS HIGH 50: CPT | Performed by: INTERNAL MEDICINE

## 2021-06-28 PROCEDURE — 36415 COLL VENOUS BLD VENIPUNCTURE: CPT

## 2021-06-28 PROCEDURE — 83735 ASSAY OF MAGNESIUM: CPT

## 2021-06-28 PROCEDURE — 6370000000 HC RX 637 (ALT 250 FOR IP): Performed by: INTERNAL MEDICINE

## 2021-06-28 PROCEDURE — 2500000003 HC RX 250 WO HCPCS: Performed by: INTERNAL MEDICINE

## 2021-06-28 PROCEDURE — 85025 COMPLETE CBC W/AUTO DIFF WBC: CPT

## 2021-06-28 PROCEDURE — 80053 COMPREHEN METABOLIC PANEL: CPT

## 2021-06-28 PROCEDURE — 2580000003 HC RX 258: Performed by: INTERNAL MEDICINE

## 2021-06-28 PROCEDURE — 82962 GLUCOSE BLOOD TEST: CPT

## 2021-06-28 PROCEDURE — 1200000000 HC SEMI PRIVATE

## 2021-06-28 RX ADMIN — OXYCODONE AND ACETAMINOPHEN 1 TABLET: 5; 325 TABLET ORAL at 13:12

## 2021-06-28 RX ADMIN — HEPARIN SODIUM 5000 UNITS: 5000 INJECTION INTRAVENOUS; SUBCUTANEOUS at 21:52

## 2021-06-28 RX ADMIN — METOPROLOL TARTRATE 25 MG: 25 TABLET, FILM COATED ORAL at 08:16

## 2021-06-28 RX ADMIN — INSULIN LISPRO 4 UNITS: 100 INJECTION, SOLUTION INTRAVENOUS; SUBCUTANEOUS at 16:57

## 2021-06-28 RX ADMIN — METOCLOPRAMIDE HYDROCHLORIDE 5 MG: 5 TABLET ORAL at 21:52

## 2021-06-28 RX ADMIN — HYDRALAZINE HYDROCHLORIDE 10 MG: 10 TABLET, FILM COATED ORAL at 08:15

## 2021-06-28 RX ADMIN — FENTANYL CITRATE 50 MCG: 50 INJECTION, SOLUTION INTRAMUSCULAR; INTRAVENOUS at 03:19

## 2021-06-28 RX ADMIN — SODIUM CHLORIDE: 9 INJECTION, SOLUTION INTRAVENOUS at 14:24

## 2021-06-28 RX ADMIN — HYDRALAZINE HYDROCHLORIDE 10 MG: 10 TABLET, FILM COATED ORAL at 21:52

## 2021-06-28 RX ADMIN — OXYCODONE AND ACETAMINOPHEN 1 TABLET: 5; 325 TABLET ORAL at 08:16

## 2021-06-28 RX ADMIN — LEVOTHYROXINE SODIUM 75 MCG: 75 TABLET ORAL at 05:54

## 2021-06-28 RX ADMIN — ROPINIROLE HYDROCHLORIDE 0.25 MG: 0.25 TABLET, FILM COATED ORAL at 21:52

## 2021-06-28 RX ADMIN — FOLIC ACID 1 MG: 1 TABLET ORAL at 08:15

## 2021-06-28 RX ADMIN — FENTANYL CITRATE 50 MCG: 50 INJECTION, SOLUTION INTRAMUSCULAR; INTRAVENOUS at 14:24

## 2021-06-28 RX ADMIN — METOCLOPRAMIDE HYDROCHLORIDE 5 MG: 5 TABLET ORAL at 08:15

## 2021-06-28 RX ADMIN — SEVELAMER CARBONATE 800 MG: 800 TABLET, FILM COATED ORAL at 08:15

## 2021-06-28 RX ADMIN — METOCLOPRAMIDE HYDROCHLORIDE 5 MG: 5 TABLET ORAL at 13:15

## 2021-06-28 RX ADMIN — FENTANYL CITRATE 50 MCG: 50 INJECTION, SOLUTION INTRAMUSCULAR; INTRAVENOUS at 16:57

## 2021-06-28 RX ADMIN — PIPERACILLIN SODIUM AND TAZOBACTAM SODIUM 3375 MG: 3; .375 INJECTION, POWDER, LYOPHILIZED, FOR SOLUTION INTRAVENOUS at 22:30

## 2021-06-28 RX ADMIN — INSULIN GLARGINE 7 UNITS: 100 INJECTION, SOLUTION SUBCUTANEOUS at 08:21

## 2021-06-28 RX ADMIN — FENTANYL CITRATE 50 MCG: 50 INJECTION, SOLUTION INTRAMUSCULAR; INTRAVENOUS at 10:24

## 2021-06-28 RX ADMIN — PANTOPRAZOLE SODIUM 40 MG: 40 TABLET, DELAYED RELEASE ORAL at 05:54

## 2021-06-28 RX ADMIN — HYDROXYZINE HYDROCHLORIDE 25 MG: 25 TABLET ORAL at 08:15

## 2021-06-28 RX ADMIN — EPOETIN ALFA-EPBX 3000 UNITS: 3000 INJECTION, SOLUTION INTRAVENOUS; SUBCUTANEOUS at 10:23

## 2021-06-28 RX ADMIN — SEVELAMER CARBONATE 800 MG: 800 TABLET, FILM COATED ORAL at 16:57

## 2021-06-28 RX ADMIN — FENTANYL CITRATE 50 MCG: 50 INJECTION, SOLUTION INTRAMUSCULAR; INTRAVENOUS at 05:54

## 2021-06-28 RX ADMIN — Medication 10 ML: at 22:30

## 2021-06-28 RX ADMIN — ARIPIPRAZOLE 5 MG: 5 TABLET ORAL at 23:07

## 2021-06-28 RX ADMIN — Medication 10 ML: at 08:16

## 2021-06-28 RX ADMIN — FENTANYL CITRATE 50 MCG: 50 INJECTION, SOLUTION INTRAMUSCULAR; INTRAVENOUS at 01:05

## 2021-06-28 RX ADMIN — INSULIN LISPRO 4 UNITS: 100 INJECTION, SOLUTION INTRAVENOUS; SUBCUTANEOUS at 08:21

## 2021-06-28 RX ADMIN — HEPARIN SODIUM 5000 UNITS: 5000 INJECTION INTRAVENOUS; SUBCUTANEOUS at 13:12

## 2021-06-28 RX ADMIN — OXYCODONE AND ACETAMINOPHEN 1 TABLET: 5; 325 TABLET ORAL at 21:52

## 2021-06-28 RX ADMIN — PIPERACILLIN SODIUM AND TAZOBACTAM SODIUM 3375 MG: 3; .375 INJECTION, POWDER, LYOPHILIZED, FOR SOLUTION INTRAVENOUS at 11:55

## 2021-06-28 RX ADMIN — SEVELAMER CARBONATE 800 MG: 800 TABLET, FILM COATED ORAL at 11:55

## 2021-06-28 RX ADMIN — MIRTAZAPINE 15 MG: 15 TABLET, FILM COATED ORAL at 21:52

## 2021-06-28 RX ADMIN — HEPARIN SODIUM 5000 UNITS: 5000 INJECTION INTRAVENOUS; SUBCUTANEOUS at 05:54

## 2021-06-28 RX ADMIN — FAMOTIDINE 10 MG: 10 INJECTION INTRAVENOUS at 08:15

## 2021-06-28 ASSESSMENT — PAIN SCALES - GENERAL
PAINLEVEL_OUTOF10: 8
PAINLEVEL_OUTOF10: 7
PAINLEVEL_OUTOF10: 8
PAINLEVEL_OUTOF10: 7

## 2021-06-28 ASSESSMENT — PAIN DESCRIPTION - DESCRIPTORS: DESCRIPTORS: CRAMPING

## 2021-06-28 ASSESSMENT — PAIN DESCRIPTION - LOCATION: LOCATION: ABDOMEN

## 2021-06-28 ASSESSMENT — PAIN DESCRIPTION - PAIN TYPE: TYPE: ACUTE PAIN

## 2021-06-28 ASSESSMENT — PAIN DESCRIPTION - ORIENTATION: ORIENTATION: LOWER

## 2021-06-28 ASSESSMENT — PAIN DESCRIPTION - ONSET: ONSET: ON-GOING

## 2021-06-28 ASSESSMENT — PAIN DESCRIPTION - FREQUENCY: FREQUENCY: CONTINUOUS

## 2021-06-28 NOTE — PROGRESS NOTES
[address] [place] [address] Hospitalist   Progress Note    Admitting Date and Time: 6/21/2021 10:37 PM  Admit Dx: DKA, type 1, not at goal [facility]) [E10.10]  Hyperosmolality [E87.0]    Subjective/interval history:    6/27: States her lower abdominal and pelvic pain is worse today. Requiring regular oxycodone and fentanyl. Blood glucose has been reasonably controlled. 6/28: Patient still having lower abdominal/pelvic pain. No improvement from yesterday.   She is asking if she can have a shower today       epoetin nena-epbx  3,000 Units Subcutaneous Once per day on Mon Wed Fri    famotidine (PEPCID) injection  10 mg Intravenous Daily    sodium chloride flush  10 mL Intravenous 2 times per day    sodium chloride  15 mL/kg Intravenous Once    heparin (porcine)  5,000 Units Subcutaneous 3 times per day    metoclopramide  5 mg Oral TID    rOPINIRole  0.25 mg Oral Nightly    mirtazapine  15 mg Oral Nightly    hydrOXYzine  25 mg Oral Daily    ARIPiprazole  5 mg Oral Nightly    sevelamer  800 mg Oral TID WC    hydrALAZINE  10 mg Oral BID    metoprolol tartrate  25 mg Oral Daily    pantoprazole  40 mg Oral QAM AC    levothyroxine  75 mcg Oral Daily    folic acid  1 mg Oral Daily    piperacillin-tazobactam  3,375 mg Intravenous Q12H    insulin glargine  7 Units Subcutaneous Daily    insulin lispro  0-12 Units Subcutaneous TID WC    insulin lispro  0-6 Units Subcutaneous Nightly    gentamicin   Topical Daily    sodium chloride flush  5-40 mL Intravenous BID     fentanNYL, 50 mcg, Q2H PRN  sodium chloride, 250 mL, PRN  glucose, 15 g, PRN  dextrose, 12.5 g, PRN  glucagon (rDNA), 1 mg, PRN  dextrose, 100 mL/hr, PRN  sodium chloride flush, 10 mL, PRN  sodium chloride, 25 mL, PRN  promethazine, 12.5 mg, Q6H PRN   Or  ondansetron, 4 mg, Q6H PRN  acetaminophen, 650 mg, Q6H PRN   Or  acetaminophen, 650 mg, Q6H PRN  polyethylene glycol, 17 g, Daily PRN  albuterol, 2.5 mg, Q6H PRN  oxyCODONE-acetaminophen, 1 tablet, Q4H PRN  heparin flush, 100 Units, PRN         Objective:    BP (!) 152/98   Pulse 104   Temp 97.7 °F (36.5 °C)   Resp 16   Ht 5' 4\" (1.626 m)   Wt 136 lb 11 oz (62 kg)   LMP 02/21/2021   SpO2 99%   BMI 23.46 kg/m²   General Appearance: Alert and oriented x3. In no acute distress  Skin: warm and dry  Head: normocephalic and atraumatic  Eyes: pupils equal, round, and reactive to light, extraocular eye movements intact, conjunctivae normal  Neck: neck supple and non tender without mass   Pulmonary/Chest: Nonlabored on room air. Clear to auscultation bilaterally. Cardiovascular: normal rate, normal S1 and S2 and no carotid bruits  Abdomen: Soft, lower abdominal tenderness without peritoneal signs, nondistended, normal bowel sounds. Peritoneal dialysis catheter in place. No palpable organomegaly. No costovertebral angle tenderness Extremities: no cyanosis, no clubbing and no edema, left leg scar from prior ulcerating wound but no open wound  Neurologic: no cranial nerve deficit and speech normal      Recent Labs     06/26/21  0630 06/27/21  0635 06/28/21  0550    135 138   K 4.2 4.6 4.2    97* 100   CO2 29 28 26   BUN 30* 35* 38*   CREATININE 6.7* 6.9* 6.9*   GLUCOSE 190* 454* 237*   CALCIUM 9.0 9.0 8.8       Recent Labs     06/26/21  0630 06/27/21  0635 06/28/21  0550   ALKPHOS 115* 133* 124*   PROT 5.4* 5.5* 5.4*   LABALBU 3.8 3.6 3.6   BILITOT 0.2 <0.2 <0.2   AST 18 43* 25   ALT 13 26 23       Recent Labs     06/26/21  0630 06/27/21  0635 06/28/21  0550   WBC 7.1 6.1 7.2   RBC 2.53* 2.67* 2.56*   HGB 7.9* 8.1* 7.9*   HCT 23.8* 26.1* 24.7*   MCV 94.1 97.8 96.5   MCH 31.2 30.3 30.9   MCHC 33.2 31.0* 32.0   RDW 14.9 16.0* 16.1*    257 293   MPV 9.5 9.5 9.6       Radiology:   CT ABDOMEN PELVIS WO CONTRAST Additional Contrast? Oral   Final Result   Circumferential wall thickening of the urinary bladder, consistent with the   patient's known diagnosis of acute cystitis.   Otherwise, no evidence of an   inflammatory or obstructive process in the abdomen or pelvis to explain pain. Moderate volume of free fluid throughout the abdomen/pelvis, likely related   to dialysate, given the peritoneal dialysis catheter in the deep pelvis. Nonspecific patchy bilateral ground-glass opacities at the lung bases. These   are nonspecific and may be on the basis of an infectious or inflammatory   pneumonitis. XR CHEST PORTABLE   Final Result   No acute process. Assessment/Plan:  Principal Problem:    DKA, type 1, not at goal [facility])  Active Problems:    Uncontrolled type 1 diabetes mellitus with hyperglycemia, with long-term current use of insulin ([facility] [address]    ESRD on peritoneal dialysis ([facility] [address]    Acute cystitis    Hypertension    Hyperosmolality    Major depressive disorder  Resolved Problems:    * No resolved hospital problems. *      1. HHS/possible early DKA in the setting of uncontrolled type 1 DM   -resolved with IV fluids and insulin drip protocol  -Continue subcutaneous insulin regimen. Glucose has been reasonably controlled    2. Recurrent UTI/acute cystitis   -Ceftriaxone changed to Zosyn (day #7) given Enterococcus infection in the past  -Urine culture shows mixed gram-positive organisms. Repeat urinalysis and culture was ordered, however she only makes urine once every few days.    -Other hospitalist was consider infectious disease given frequent urinary tract infections as she may need chronic prophylactic antibiotics. However, I decided to go with urology consult to evaluate for interstitial cystitis. 3.  Abdominal pain and diarrhea  -Given the fact that she receives frequent IV antibiotics,  C. Difficile cancelled. - CT of the abdomen pelvis no acute changes some thickening in the bladder due to cystitis. There is some free fluid in the abdomen, however this is consistent with her peritoneal dialysis  -She is still having significant pain.   Dose of fentanyl increased Saturday, but frequency decreased. 4.  ESRD on peritoneal dialysis   -Nephrology following for PD management    5. Uncontrolled type I DM  Off insulin drip; continue subcutaneous insulin regimen    6. Essential hypertension   -Continue home hydralazine and metoprolol tartrate. Pressure variable but reasonably controlled    7. Depression  -Continue home Abilify and Remeron    8. Hypothyroidism  -continue home levothyroxine    DVT prophylaxis: Subcutaneous heparin  CODE STATUS: Full code       NOTE: This report was transcribed using voice recognition software. Every effort was made to ensure accuracy; however, inadvertent computerized transcription errors may be present.      Electronically signed by César Garcia MD on 6/28/2021 at 2:47 PM

## 2021-06-28 NOTE — PROGRESS NOTES
mL Intravenous BID     Continuous Infusions:   sodium chloride      dextrose      insulin Stopped (06/22/21 1235)    sodium chloride 25 mL (06/23/21 1633)    sodium chloride Stopped (06/22/21 0804)    dextrose 5 % and 0.9 % NaCl Stopped (06/22/21 1235)    sodium chloride Stopped (06/27/21 1525)     PRN Meds:.fentanNYL, sodium chloride, glucose, dextrose, glucagon (rDNA), dextrose, sodium chloride flush, sodium chloride, promethazine **OR** ondansetron, acetaminophen **OR** acetaminophen, polyethylene glycol, albuterol, oxyCODONE-acetaminophen, heparin flush    DATA:    CBC:   Lab Results   Component Value Date    WBC 7.2 06/28/2021    RBC 2.56 06/28/2021    HGB 7.9 06/28/2021    HCT 24.7 06/28/2021    MCV 96.5 06/28/2021    MCH 30.9 06/28/2021    MCHC 32.0 06/28/2021    RDW 16.1 06/28/2021     06/28/2021    MPV 9.6 06/28/2021     CMP:    Lab Results   Component Value Date     06/28/2021    K 4.2 06/28/2021    K 4.0 06/22/2021     06/28/2021    CO2 26 06/28/2021    BUN 38 06/28/2021    CREATININE 6.9 06/28/2021    GFRAA 9 06/28/2021    LABGLOM 9 06/28/2021    GLUCOSE 237 06/28/2021    GLUCOSE 130 05/18/2012    PROT 5.4 06/28/2021    LABALBU 3.6 06/28/2021    LABALBU 4.1 05/18/2012    CALCIUM 8.8 06/28/2021    BILITOT <0.2 06/28/2021    ALKPHOS 124 06/28/2021    AST 25 06/28/2021    ALT 23 06/28/2021     Magnesium:    Lab Results   Component Value Date    MG 2.1 06/28/2021     Phosphorus:    Lab Results   Component Value Date    PHOS 4.8 06/28/2021     Radiology Review:      Chest x-ray June 21, 2021   No acute process.             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 In the emergency room she was found to have a glucose level of 874, beta hydroxybutyrate of 3.94. She was admitted to MICU. IMPRESSION/RECOMMENDATIONS:      1. ESRD on peritoneal dialysis, to continue CCPD 4 exchanges over 10 hours of 1.5% with long dwell of 2.5%  2. HTN, on hydralazine 10 mg twice daily and metoprolol 25 mg daily  3. UTI, on piperacillin-tazobactam  4. MBD of CKD, on sevelamer  5. Anemia of CKD, on epoetin alpha  6.  Severe hypoalbuminemia/malnutrition    Plan:    · Continue CCPD 4 exchanges over 10 hours of 1.5% with long dwell of 2.5%  · Continue to monitor electrolytes  · Continue epoetin alpha 3000 units 3 times a week  · Discharge planning

## 2021-06-28 NOTE — PROGRESS NOTES
ccpd completed without complications, aseptic technique used. effluent drained clear yellow, no signs of infection. dressing change refused.  vss post treatment    715 ml net UF

## 2021-06-28 NOTE — PLAN OF CARE
Problem: Pain:  Goal: Control of chronic pain  Description: Control of chronic pain  Outcome: Met This Shift     Problem: Pain:  Goal: Control of acute pain  Description: Control of acute pain  Outcome: Met This Shift     Problem: Pain:  Goal: Pain level will decrease  Description: Pain level will decrease  Outcome: Met This Shift     Problem: Falls - Risk of:  Goal: Absence of physical injury  Description: Absence of physical injury  Outcome: Met This Shift     Problem: Falls - Risk of:  Goal: Will remain free from falls  Description: Will remain free from falls  Outcome: Met This Shift     Problem: Skin Integrity:  Goal: Absence of new skin breakdown  Description: Absence of new skin breakdown  Outcome: Met This Shift     Problem: Skin Integrity:  Goal: Will show no infection signs and symptoms  Description: Will show no infection signs and symptoms  Outcome: Met This Shift

## 2021-06-28 NOTE — PROGRESS NOTES
Comprehensive Nutrition Assessment    Type and Reason for Visit:  Initial, RD Nutrition Re-Screen/LOS    Nutrition Recommendations/Plan: Continue with diet and monitor    Nutrition Assessment:  Pt at nutritional risk d/t h/o ESRD on PD. Pt at further risk 2/2 H/o Type 1 DM w/ re-occuring DKA Tolerating diet w/>75% intakes, declines ONS. Pt w/ complex medical hx : Type 1 DM and CKD progression. Will continue to monitor    Malnutrition Assessment:  Malnutrition Status:  No malnutrition    Context:  Chronic Illness     Findings of the 6 clinical characteristics of malnutrition:  Energy Intake:  No significant decrease in energy intake  Weight Loss:  No significant weight loss     Body Fat Loss:  No significant body fat loss     Muscle Mass Loss:  No significant muscle mass loss    Fluid Accumulation:  No significant fluid accumulation     Strength:  Not Performed    Estimated Daily Nutrient Needs:  Energy (kcal):  5788-0563 (d47-27dbep/kg); Weight Used for Energy Requirements:  Current     Protein (g):  80-90 (x1.3-1.5gm/kg); Weight Used for Protein Requirements:  Current        Fluid (ml/day):   ; Method Used for Fluid Requirements:  Standard Renal      Nutrition Related Findings:  CCPD dilalysate kcal: 306kcal/d, A/ox4, Abd WDL, +BS, No edema, I/O WNL      Wounds:  None       Current Nutrition Therapies:    ADULT DIET; Regular; 5 carb choices (75 gm/meal)    Anthropometric Measures:  · Height: 5' 4\" (162.6 cm)  · Current Body Weight: 136 lb (61.7 kg) (6/26 bed scale)    · Usual Body Weight: 140 lb (63.5 kg) (5/14 bed scale per emr review)     · Ideal Body Weight: 120 lbs; % Ideal Body Weight 113.3 %   · BMI: 23.3  · Adjusted Body Weight:  ; No Adjustment       · BMI Categories: Normal Weight (BMI 18.5-24. 9)       Nutrition Diagnosis:   · Increased nutrient needs related to increase demand for energy/nutrients as evidenced by dialysis, other (comment) (endocrine dysfunction)      Nutrition Interventions:

## 2021-06-28 NOTE — CONSULTS
[address] [facility]                                  CONSULTATION    PATIENT NAME: Yaz James                 :        1990  MED REC NO:   [id number]                            ROOM:       6676  ACCOUNT NO:   [de-identified]                           ADMIT DATE: 2021  PROVIDER:     Shad Pérez MD    CONSULT DATE:  2021    LOCATION:  She is in room 332. CHIEF COMPLAINT:  Bladder pain. HISTORY OF PRESENT ILLNESS:  This 72-year-old female who is on chronic  peritoneal dialysis, voids once every two days, and has considerable  discomfort when she does avoid. She is not incontinent and has not had  hematuria. Her CAT scan revealed her kidneys to be without any  hydronephrosis or stones. Her bladder appeared to be of normal wall  thickness and appeared to be somewhat dilated. The patient was admitted  to the hospital on  with complaints of some nausea, generalized  fatigue, and arthralgia. She does seem to be improving. MEDICATIONS:  Remeron, Lantus, Humalog, Garamycin cream, Synthroid,  Abilify, Atarax, Proventil, Renvela, Apresoline, Lopressor, _____. PHYSICAL EXAMINATION:  GENERAL:  The patient is alert and oriented, appears to be relatively  comfortable. ABDOMEN:  Soft. She does have a peritoneal port in place. There does  not appear to be any lower abdominal distention. IMPRESSION:  Possible chronic cystitis. PLAN:  Catheterize her bladder to determine the capacity. Urine will be  sent for culture and sensitivity as well as urinalysis. The patient may  benefit from antispasmodics if her urine is clear.         Kari Jimenez MD    D: 2021 16:50:00       T: 2021 16:53:23     [email or web address]  Job#: [id number]     Doc#: [id number]    CC:

## 2021-06-29 LAB
ALBUMIN SERPL-MCNC: 3.6 G/DL (ref 3.5–5.2)
ALP BLD-CCNC: 120 U/L (ref 35–104)
ALT SERPL-CCNC: 27 U/L (ref 0–32)
ANION GAP SERPL CALCULATED.3IONS-SCNC: 16 MMOL/L (ref 7–16)
AST SERPL-CCNC: 30 U/L (ref 0–31)
BACTERIA: ABNORMAL /HPF
BASOPHILS ABSOLUTE: 0.06 E9/L (ref 0–0.2)
BASOPHILS RELATIVE PERCENT: 0.9 % (ref 0–2)
BILIRUB SERPL-MCNC: <0.2 MG/DL (ref 0–1.2)
BILIRUBIN URINE: NEGATIVE
BLOOD, URINE: ABNORMAL
BUN BLDV-MCNC: 52 MG/DL (ref 6–20)
CALCIUM SERPL-MCNC: 8.9 MG/DL (ref 8.6–10.2)
CHLORIDE BLD-SCNC: 100 MMOL/L (ref 98–107)
CLARITY: ABNORMAL
CO2: 25 MMOL/L (ref 22–29)
COLOR: YELLOW
CREAT SERPL-MCNC: 7.8 MG/DL (ref 0.5–1)
EOSINOPHILS ABSOLUTE: 0.4 E9/L (ref 0.05–0.5)
EOSINOPHILS RELATIVE PERCENT: 6.2 % (ref 0–6)
EPITHELIAL CELLS, UA: ABNORMAL /HPF
GFR AFRICAN AMERICAN: 7
GFR NON-AFRICAN AMERICAN: 7 ML/MIN/1.73
GLUCOSE BLD-MCNC: 284 MG/DL (ref 74–99)
GLUCOSE URINE: NEGATIVE MG/DL
HCT VFR BLD CALC: 25.5 % (ref 34–48)
HEMOGLOBIN: 7.7 G/DL (ref 11.5–15.5)
IMMATURE GRANULOCYTES #: 0.04 E9/L
IMMATURE GRANULOCYTES %: 0.6 % (ref 0–5)
KETONES, URINE: 15 MG/DL
LEUKOCYTE ESTERASE, URINE: ABNORMAL
LYMPHOCYTES ABSOLUTE: 1.8 E9/L (ref 1.5–4)
LYMPHOCYTES RELATIVE PERCENT: 28.1 % (ref 20–42)
MAGNESIUM: 2.1 MG/DL (ref 1.6–2.6)
MCH RBC QN AUTO: 30.2 PG (ref 26–35)
MCHC RBC AUTO-ENTMCNC: 30.2 % (ref 32–34.5)
MCV RBC AUTO: 100 FL (ref 80–99.9)
METER GLUCOSE: 115 MG/DL (ref 74–99)
METER GLUCOSE: 132 MG/DL (ref 74–99)
METER GLUCOSE: 258 MG/DL (ref 74–99)
METER GLUCOSE: 384 MG/DL (ref 74–99)
MONOCYTES ABSOLUTE: 0.45 E9/L (ref 0.1–0.95)
MONOCYTES RELATIVE PERCENT: 7 % (ref 2–12)
NEUTROPHILS ABSOLUTE: 3.66 E9/L (ref 1.8–7.3)
NEUTROPHILS RELATIVE PERCENT: 57.2 % (ref 43–80)
NITRITE, URINE: NEGATIVE
PDW BLD-RTO: 16.8 FL (ref 11.5–15)
PH UA: 6 (ref 5–9)
PHOSPHORUS: 5.5 MG/DL (ref 2.5–4.5)
PLATELET # BLD: 333 E9/L (ref 130–450)
PMV BLD AUTO: 9.6 FL (ref 7–12)
POTASSIUM SERPL-SCNC: 4.4 MMOL/L (ref 3.5–5)
PROTEIN UA: >=300 MG/DL
RBC # BLD: 2.55 E12/L (ref 3.5–5.5)
RBC UA: ABNORMAL /HPF (ref 0–2)
SODIUM BLD-SCNC: 141 MMOL/L (ref 132–146)
SPECIFIC GRAVITY UA: 1.02 (ref 1–1.03)
TOTAL PROTEIN: 5.6 G/DL (ref 6.4–8.3)
UROBILINOGEN, URINE: 0.2 E.U./DL
WBC # BLD: 6.4 E9/L (ref 4.5–11.5)
WBC UA: ABNORMAL /HPF (ref 0–5)

## 2021-06-29 PROCEDURE — 6370000000 HC RX 637 (ALT 250 FOR IP): Performed by: INTERNAL MEDICINE

## 2021-06-29 PROCEDURE — 2580000003 HC RX 258: Performed by: INTERNAL MEDICINE

## 2021-06-29 PROCEDURE — 81001 URINALYSIS AUTO W/SCOPE: CPT

## 2021-06-29 PROCEDURE — 82962 GLUCOSE BLOOD TEST: CPT

## 2021-06-29 PROCEDURE — 90945 DIALYSIS ONE EVALUATION: CPT

## 2021-06-29 PROCEDURE — 97530 THERAPEUTIC ACTIVITIES: CPT

## 2021-06-29 PROCEDURE — 85025 COMPLETE CBC W/AUTO DIFF WBC: CPT

## 2021-06-29 PROCEDURE — 99232 SBSQ HOSP IP/OBS MODERATE 35: CPT | Performed by: INTERNAL MEDICINE

## 2021-06-29 PROCEDURE — 87088 URINE BACTERIA CULTURE: CPT

## 2021-06-29 PROCEDURE — 36415 COLL VENOUS BLD VENIPUNCTURE: CPT

## 2021-06-29 PROCEDURE — 1200000000 HC SEMI PRIVATE

## 2021-06-29 PROCEDURE — 2500000003 HC RX 250 WO HCPCS: Performed by: INTERNAL MEDICINE

## 2021-06-29 PROCEDURE — 6360000002 HC RX W HCPCS: Performed by: NURSE PRACTITIONER

## 2021-06-29 PROCEDURE — 83735 ASSAY OF MAGNESIUM: CPT

## 2021-06-29 PROCEDURE — 84100 ASSAY OF PHOSPHORUS: CPT

## 2021-06-29 PROCEDURE — 80053 COMPREHEN METABOLIC PANEL: CPT

## 2021-06-29 PROCEDURE — 6360000002 HC RX W HCPCS: Performed by: INTERNAL MEDICINE

## 2021-06-29 PROCEDURE — 87186 SC STD MICRODIL/AGAR DIL: CPT

## 2021-06-29 PROCEDURE — 87077 CULTURE AEROBIC IDENTIFY: CPT

## 2021-06-29 RX ADMIN — INSULIN LISPRO 4 UNITS: 100 INJECTION, SOLUTION INTRAVENOUS; SUBCUTANEOUS at 08:33

## 2021-06-29 RX ADMIN — HYDRALAZINE HYDROCHLORIDE 10 MG: 10 TABLET, FILM COATED ORAL at 08:31

## 2021-06-29 RX ADMIN — LEVOTHYROXINE SODIUM 75 MCG: 75 TABLET ORAL at 05:47

## 2021-06-29 RX ADMIN — HEPARIN SODIUM 5000 UNITS: 5000 INJECTION INTRAVENOUS; SUBCUTANEOUS at 13:48

## 2021-06-29 RX ADMIN — Medication 10 ML: at 21:27

## 2021-06-29 RX ADMIN — FENTANYL CITRATE 50 MCG: 50 INJECTION, SOLUTION INTRAMUSCULAR; INTRAVENOUS at 13:47

## 2021-06-29 RX ADMIN — FENTANYL CITRATE 50 MCG: 50 INJECTION, SOLUTION INTRAMUSCULAR; INTRAVENOUS at 00:57

## 2021-06-29 RX ADMIN — GENTAMICIN SULFATE: 1 CREAM TOPICAL at 00:39

## 2021-06-29 RX ADMIN — SEVELAMER CARBONATE 800 MG: 800 TABLET, FILM COATED ORAL at 16:59

## 2021-06-29 RX ADMIN — INSULIN GLARGINE 7 UNITS: 100 INJECTION, SOLUTION SUBCUTANEOUS at 08:33

## 2021-06-29 RX ADMIN — ARIPIPRAZOLE 5 MG: 5 TABLET ORAL at 21:22

## 2021-06-29 RX ADMIN — ROPINIROLE HYDROCHLORIDE 0.25 MG: 0.25 TABLET, FILM COATED ORAL at 21:21

## 2021-06-29 RX ADMIN — FENTANYL CITRATE 50 MCG: 50 INJECTION, SOLUTION INTRAMUSCULAR; INTRAVENOUS at 19:12

## 2021-06-29 RX ADMIN — PIPERACILLIN SODIUM AND TAZOBACTAM SODIUM 3375 MG: 3; .375 INJECTION, POWDER, LYOPHILIZED, FOR SOLUTION INTRAVENOUS at 22:35

## 2021-06-29 RX ADMIN — SODIUM CHLORIDE: 9 INJECTION, SOLUTION INTRAVENOUS at 15:03

## 2021-06-29 RX ADMIN — Medication 10 ML: at 21:26

## 2021-06-29 RX ADMIN — FAMOTIDINE 10 MG: 10 INJECTION INTRAVENOUS at 08:32

## 2021-06-29 RX ADMIN — METOCLOPRAMIDE HYDROCHLORIDE 5 MG: 5 TABLET ORAL at 08:31

## 2021-06-29 RX ADMIN — SEVELAMER CARBONATE 800 MG: 800 TABLET, FILM COATED ORAL at 12:44

## 2021-06-29 RX ADMIN — METOPROLOL TARTRATE 25 MG: 25 TABLET, FILM COATED ORAL at 08:31

## 2021-06-29 RX ADMIN — SEVELAMER CARBONATE 800 MG: 800 TABLET, FILM COATED ORAL at 08:30

## 2021-06-29 RX ADMIN — METOCLOPRAMIDE HYDROCHLORIDE 5 MG: 5 TABLET ORAL at 21:21

## 2021-06-29 RX ADMIN — HEPARIN SODIUM 5000 UNITS: 5000 INJECTION INTRAVENOUS; SUBCUTANEOUS at 05:47

## 2021-06-29 RX ADMIN — FENTANYL CITRATE 50 MCG: 50 INJECTION, SOLUTION INTRAMUSCULAR; INTRAVENOUS at 08:49

## 2021-06-29 RX ADMIN — PANTOPRAZOLE SODIUM 40 MG: 40 TABLET, DELAYED RELEASE ORAL at 05:47

## 2021-06-29 RX ADMIN — Medication 10 ML: at 08:43

## 2021-06-29 RX ADMIN — INSULIN LISPRO 10 UNITS: 100 INJECTION, SOLUTION INTRAVENOUS; SUBCUTANEOUS at 17:00

## 2021-06-29 RX ADMIN — FENTANYL CITRATE 50 MCG: 50 INJECTION, SOLUTION INTRAMUSCULAR; INTRAVENOUS at 23:41

## 2021-06-29 RX ADMIN — METOCLOPRAMIDE HYDROCHLORIDE 5 MG: 5 TABLET ORAL at 13:48

## 2021-06-29 RX ADMIN — OXYCODONE AND ACETAMINOPHEN 1 TABLET: 5; 325 TABLET ORAL at 06:18

## 2021-06-29 RX ADMIN — FOLIC ACID 1 MG: 1 TABLET ORAL at 08:31

## 2021-06-29 RX ADMIN — GENTAMICIN SULFATE: 1 CREAM TOPICAL at 08:41

## 2021-06-29 RX ADMIN — PIPERACILLIN SODIUM AND TAZOBACTAM SODIUM 3375 MG: 3; .375 INJECTION, POWDER, LYOPHILIZED, FOR SOLUTION INTRAVENOUS at 11:34

## 2021-06-29 RX ADMIN — MIRTAZAPINE 15 MG: 15 TABLET, FILM COATED ORAL at 21:21

## 2021-06-29 RX ADMIN — HYDROXYZINE HYDROCHLORIDE 25 MG: 25 TABLET ORAL at 08:31

## 2021-06-29 RX ADMIN — Medication 10 ML: at 08:42

## 2021-06-29 RX ADMIN — OXYCODONE AND ACETAMINOPHEN 1 TABLET: 5; 325 TABLET ORAL at 21:21

## 2021-06-29 RX ADMIN — HEPARIN SODIUM 5000 UNITS: 5000 INJECTION INTRAVENOUS; SUBCUTANEOUS at 21:33

## 2021-06-29 ASSESSMENT — PAIN SCALES - GENERAL
PAINLEVEL_OUTOF10: 7
PAINLEVEL_OUTOF10: 8
PAINLEVEL_OUTOF10: 10
PAINLEVEL_OUTOF10: 7
PAINLEVEL_OUTOF10: 3
PAINLEVEL_OUTOF10: 3
PAINLEVEL_OUTOF10: 8

## 2021-06-29 NOTE — PROGRESS NOTES
CCPD initiated without complications. Aseptic technique used. Dressing changed this morning. Effluent drained clear yellow,no sign of infection no cloudiness.  vss    Uf drained- 0 ml

## 2021-06-29 NOTE — PROGRESS NOTES
[address] [place] [address] Hospitalist   Progress Note    Admitting Date and Time: 6/21/2021 10:37 PM  Admit Dx: DKA, type 1, not at goal [facility]) [E10.10]  Hyperosmolality [E87.0]    Subjective/interval history:    6/27: States her lower abdominal and pelvic pain is worse today. Requiring regular oxycodone and fentanyl. Blood glucose has been reasonably controlled. 6/28: Patient still having lower abdominal/pelvic pain. No improvement from yesterday. She is asking if she can have a shower today. 6/29: Patient still having a lot of cramping in bladder and only gets about 30 minutes of relief after dose of Fentanyl. Per RN: patient declined straight cath but did void today.      epoetin nena-epbx  3,000 Units Subcutaneous Once per day on Mon Wed Fri    famotidine (PEPCID) injection  10 mg Intravenous Daily    sodium chloride flush  10 mL Intravenous 2 times per day    sodium chloride  15 mL/kg Intravenous Once    heparin (porcine)  5,000 Units Subcutaneous 3 times per day    metoclopramide  5 mg Oral TID    rOPINIRole  0.25 mg Oral Nightly    mirtazapine  15 mg Oral Nightly    hydrOXYzine  25 mg Oral Daily    ARIPiprazole  5 mg Oral Nightly    sevelamer  800 mg Oral TID WC    hydrALAZINE  10 mg Oral BID    metoprolol tartrate  25 mg Oral Daily    pantoprazole  40 mg Oral QAM AC    levothyroxine  75 mcg Oral Daily    folic acid  1 mg Oral Daily    piperacillin-tazobactam  3,375 mg Intravenous Q12H    insulin glargine  7 Units Subcutaneous Daily    insulin lispro  0-12 Units Subcutaneous TID WC    insulin lispro  0-6 Units Subcutaneous Nightly    gentamicin   Topical Daily    sodium chloride flush  5-40 mL Intravenous BID     fentanNYL, 50 mcg, Q2H PRN  sodium chloride, 250 mL, PRN  glucose, 15 g, PRN  dextrose, 12.5 g, PRN  glucagon (rDNA), 1 mg, PRN  dextrose, 100 mL/hr, PRN  sodium chloride flush, 10 mL, PRN  sodium chloride, 25 mL, PRN  promethazine, 12.5 mg, Q6H PRN Or  ondansetron, 4 mg, Q6H PRN  acetaminophen, 650 mg, Q6H PRN   Or  acetaminophen, 650 mg, Q6H PRN  polyethylene glycol, 17 g, Daily PRN  albuterol, 2.5 mg, Q6H PRN  oxyCODONE-acetaminophen, 1 tablet, Q4H PRN  heparin flush, 100 Units, PRN         Objective:    /75   Pulse 98   Temp 98.5 °F (36.9 °C) (Oral)   Resp 18   Ht 5' 4\" (1.626 m)   Wt 136 lb 11 oz (62 kg)   LMP 02/21/2021   SpO2 95%   BMI 23.46 kg/m²   General Appearance: Alert and oriented x3. In no acute distress  Skin: warm and dry  Head: normocephalic and atraumatic  Eyes: pupils equal, round, and reactive to light, extraocular eye movements intact, conjunctivae normal  Neck: neck supple and non tender without mass   Pulmonary/Chest: Nonlabored on room air. Clear to auscultation bilaterally. Cardiovascular: normal rate, normal S1 and S2 and no carotid bruits  Abdomen: Soft, lower abdominal tenderness without peritoneal signs, nondistended, normal bowel sounds. Peritoneal dialysis catheter in place. No palpable organomegaly.   No costovertebral angle tenderness Extremities: no cyanosis, no clubbing and no edema, left leg scar from prior ulcerating wound but no open wound  Neurologic: no cranial nerve deficit and speech normal      Recent Labs     06/27/21  0635 06/28/21  0550 06/29/21  0600    138 141   K 4.6 4.2 4.4   CL 97* 100 100   CO2 28 26 25   BUN 35* 38* 52*   CREATININE 6.9* 6.9* 7.8*   GLUCOSE 454* 237* 284*   CALCIUM 9.0 8.8 8.9       Recent Labs     06/27/21  0635 06/28/21  0550 06/29/21  0600   ALKPHOS 133* 124* 120*   PROT 5.5* 5.4* 5.6*   LABALBU 3.6 3.6 3.6   BILITOT <0.2 <0.2 <0.2   AST 43* 25 30   ALT 26 23 27       Recent Labs     06/27/21  0635 06/28/21  0550 06/29/21  0600   WBC 6.1 7.2 6.4   RBC 2.67* 2.56* 2.55*   HGB 8.1* 7.9* 7.7*   HCT 26.1* 24.7* 25.5*   MCV 97.8 96.5 100.0*   MCH 30.3 30.9 30.2   MCHC 31.0* 32.0 30.2*   RDW 16.0* 16.1* 16.8*    293 333   MPV 9.5 9.6 9.6       Radiology:   CT ABDOMEN PELVIS WO CONTRAST Additional Contrast? Oral   Final Result   Circumferential wall thickening of the urinary bladder, consistent with the   patient's known diagnosis of acute cystitis. Otherwise, no evidence of an   inflammatory or obstructive process in the abdomen or pelvis to explain pain. Moderate volume of free fluid throughout the abdomen/pelvis, likely related   to dialysate, given the peritoneal dialysis catheter in the deep pelvis. Nonspecific patchy bilateral ground-glass opacities at the lung bases. These   are nonspecific and may be on the basis of an infectious or inflammatory   pneumonitis. XR CHEST PORTABLE   Final Result   No acute process. Assessment/Plan:  Principal Problem:    DKA, type 1, not at goal [facility])  Active Problems:    Uncontrolled type 1 diabetes mellitus with hyperglycemia, with long-term current use of insulin ([facility]    ESRD on peritoneal dialysis ([facility]    Acute cystitis    Hypertension    Hyperosmolality    Major depressive disorder  Resolved Problems:    * No resolved hospital problems. *      1. HHS/possible early DKA in the setting of uncontrolled type 1 DM   -resolved with IV fluids and insulin drip protocol  -Continue subcutaneous insulin regimen. Glucose has been reasonably controlled    2. Recurrent UTI/acute cystitis   -Ceftriaxone changed to Zosyn (day #8) given Enterococcus infection in the past  -Urine culture shows mixed gram-positive organisms. Repeat urinalysis and culture was ordered, however she only makes urine once every few days.    -Other hospitalist was consider infectious disease given frequent urinary tract infections as she may need chronic prophylactic antibiotics. However, I decided to go with urology consult to evaluate for interstitial cystitis. They saw patient and felt she possibly had chronic cystitis but she declined straight cath.  She did void this morning and it showed large LE and packed WBC and many

## 2021-06-29 NOTE — PROGRESS NOTES
Physical Therapy Treatment Note/Plan of Care    Room #:  8313/5490-52  Patient Name: Cherlynn Severance  YOB: 1990  MRN: [id number]    Date of Service: 6/29/2021     Tentative placement recommendation: Home    Equipment recommendation: Patient has needed equipment       Evaluating Physical Therapist: Roxene Babinski, Student Physical Therapist    Specific Provider Orders/Date/Referring Provider :  06/22/21 1030   PT eval and treat Start: 06/22/21 1030, End: 06/22/21 1030, ONE TIME, Standing Count: 1 Occurrences, Iram Lincoln MD    Admitting Diagnosis:   DKA, type 1, not at goal [facility]) [E10.10]  Hyperosmolality [E87.0]      Visit Diagnoses       Codes    Diabetic ketoacidosis without coma associated with other specified diabetes mellitus ([address]    -  Primary E13.10    Chronic kidney disease, unspecified CKD stage     N18.9    Urinary tract infection without hematuria, site unspecified     N39.0        Surgery: none    Patient Active Problem List   Diagnosis    Non compliance w medication regimen    Tobacco smoking complicating pregnancy    MTHFR mutation    Hypertension    Iron deficiency anemia    Sinus tachycardia    Bladder dysfunction    Hypokalemia    Severe protein-calorie malnutrition ([address]    Uncontrolled type 1 diabetes mellitus with hyperglycemia, with long-term current use of insulin ([address]    ESRD on peritoneal dialysis ([address]    Hypothyroidism    Hyperlipidemia    Acute cystitis    Nondisplaced fracture of neck of fifth metacarpal bone, left hand, initial encounter for closed fracture    Chronic right-sided low back pain    Endocarditis    Seizure ([address]    Hyperkalemia    Hypomagnesemia    Hypocalcemia    Intractable nausea and vomiting    Wound of left leg, sequela    Syncope    DKA, type 1, not at goal [facility])    Hyperosmolality    Major depressive disorder      ASSESSMENT of Current Deficits Patient exhibits decreased strength and pain 8/10 globally impairing tolerance to activity. Patient was willing to work with therapy however needing encouragement . Slow dianne with iv pole. Patient fatigue and just wanting to rest, however followed all therapist directions. Patient requires continued skilled physical therapy to address concerns listed above for increased safety and function at discharge. PHYSICAL THERAPY  PLAN OF CARE     Physical therapy plan of care is established based on physician order,  patient diagnosis and clinical assessment    Current Treatment Recommendations:    -Gait: Gait training   -Endurance: Utilize Supervised activities to increase level of endurance to allow for safe functional mobility including transfers and gait   -Stairs: Stair training with instruction on proper technique and hand placement on rail    PT long term treatment goals are located in below grid    Patient and or family understand(s) diagnosis, prognosis, and plan of care. Frequency of treatments: Patient will be seen  2-3 times/week.        Prior Level of Function: Patient ambulated independently with wheelchair for distance  Rehab Potential: good    for baseline    Past medical history:   Past Medical History:   Diagnosis Date    Acute congestive heart failure ([address]     BC (acute kidney injury) ([address] 10/1/2019    Cardiac arrest ([address] 2/15/2021    Cephalgia 10/9/2019    Chronic kidney disease     Depression     Diabetes mellitus ([address]     Diabetic gastroparesis associated with type 1 diabetes mellitus ([address] 12/17/2018    Diabetic ketoacidosis ([address] 8/27/2011    Diabetic ketoacidosis with coma associated with type 1 diabetes mellitus ([address] 6/26/2013    Diabetic polyneuropathy associated with type 1 diabetes mellitus ([address] 12/27/2020    Drug use complicating pregnancy in third trimester     Endocarditis 10/31/2020    ESRD (end stage renal disease) ([address] 9/29/2020    Hemodialysis patient ([address]     History of blood transfusion 11/2019    Hyperlipidemia 10/8/2020    Hyperosmolar hyperglycemic state (HHS) ([facility] 2020    Hypothyroidism 10/8/2020    Iron deficiency anemia 10/1/2019    MDRO (multiple drug resistant organisms) resistance     MRSA (methicillin resistant Staphylococcus aureus)     back wound abcess    Non compliance w medication regimen 3/30/2016    Other disorders of kidney and ureter     Pregnancy 3/30/2016    16 weeks    Previous  delivery affecting pregnancy, antepartum 3/2/2017    Previous stillbirth or demise, antepartum 2016    Seizure ([facility] 2020    Severe pre-eclampsia in third trimester 2016    Shock liver 2/15/2021    Valvular endocarditis 11/10/2020    This Diagnosis was added to the Problem List based on transcribed orders from Dr. Yvone Goodell        Past Surgical History:   Procedure Laterality Date    BACK SURGERY      abscess   [address]      x2    CHOLECYSTECTOMY, LAPAROSCOPIC N/A 2019    CHOLECYSTECTOMY LAPAROSCOPIC performed by Bharath Lanza MD at [address] [place] [address] N/A 2018    COLONOSCOPY WITH BIOPSY performed by Bernardino Moreau MD at [address] [place] [address] COLONOSCOPY N/A 2018    COLONOSCOPY WITH BIOPSY performed by Oliver Patel MD at [address] [place] [address] ECHO COMPL [address]  2012    EF 57%    ECHO COMPL W DOP COLOR FLOW  6/10/2013         EMBOLECTOMY N/A 2020    [address] [place] [address], [address] [place] [address], YULISSA -- REQS ROOM 3 performed by Olman Cortés MD at [address] [place] [address] Left 2021    LEFT LEG INCISION AND DRAINAGE, DEBRIDEMENT, WOUND VAC APPLICATION performed by Chapincito Khan DPM at [address] [place] [address] Left 2021    LEFT LEG DEBRIDEMENT BIOPSY POSS APPLICATION WOUND VAC performed by Chapincito Khan DPM at [place] [address] N/A 2020    LAPAROSCOPIC INSERTION PERITONEAL DIALYSIS CATHETER performed by Wayman Schlatter, MD at [facility] ESOPHAGOGASTRODUODENOSCOPY TRANSORAL DIAGNOSTIC N/A 5/30/2018    EGD ESOPHAGOGASTRODUODENOSCOPY performed by Allie Elias MD at [address] [facility] TRANSESOPHAGEAL ECHOCARDIOGRAM N/A 10/19/2020    TRANSESOPHAGEAL ECHOCARDIOGRAM WITH BUBBLE STUDY performed by Ketty Childers MD at [address] [facility] TUNNELED VENOUS PORT PLACEMENT  04/2018    UPPER GASTROINTESTINAL ENDOSCOPY  12/18/2018    EGD BIOPSY performed by Jody Nolasco MD at [address] [facility] N/A 10/11/2019    EGD ESOPHAGOGASTRODUODENOSCOPY performed by Beth Ramírez DO at [address]:  Precautions: Up as tolerated, falls and alarm ,    Social history: Patient lives with children 4/almost 6 yo 10 months apart in a apartment 2nd floor  with 6 steps  to enter with [address] shower     Equipment owned: Wheelchair and [employer] chair,      [facility]   How much difficulty turning over in bed?: None  How much difficulty sitting down on / standing up from a chair with arms?: None  How much difficulty moving from lying on back to sitting on side of bed?: None  How much help from another person moving to and from a bed to a chair?: None  How much help from another person needed to walk in hospital room?: A Little  How much help from another person for climbing 3-5 steps with a railing?: A Little  AM-PAC Inpatient Mobility Raw Score : 22  AM-PAC Inpatient T-Scale Score : 53.28  Mobility Inpatient CMS 0-100% Score: 20.91  Mobility Inpatient CMS G-Code Modifier : [address] [facility] [address] cleared patient for PT treatment. OBJECTIVE;   Initial Evaluation  Date: 6/22/2021 Treatment Date:   6/29/2021     Short Term/ Long Term   Goals   Was pt agreeable to Eval/treatment?  Yes   Yes after pain med To be met in 3 days   Pain level   7/10  global 8/10 back and lower abdomen    Bed Mobility    Rolling: Supervision     Supine to sit: Supervision     Sit to supine: Not assessed     Scooting: Supervision    Rolling: Independent Supine to sit: Independent    Sit to supine: Independent    Scooting: Independent    Rolling: Independent     Supine to sit: Independent    Sit to supine: Independent    Scooting: Independent     Transfers Sit to stand: Supervision  with therapist for line management   Sit to stand: Supervision   To independednt    Sit to stand: Independent     Ambulation    3-4 steps using  hand held assist with Minimal assist of 1     40 feet using  IV pole with Supervision       Slow dianne  50 feet using  no device with Independent    Stair negotiation: ascended and descended   Not assessed       6 steps with railing to simulate home environment   ROM Within functional limits         Strength BUE:  refer to OT eval  RLE:  3+/5  LLE:  3+/5   Increase strength in affected mm groups by 1/3 grade   Balance Sitting EOB:  good    Dynamic Standing:  fair   Sitting EOB: good    Dynamic Standing: good minus   Sitting EOB:  good    Dynamic Standing: good       Patient is Alert & Oriented x person, place, time and situation and follows directions    Sensation:  Patient  denies numbness/tingling     Edema:  yes bilateral lower extremities    Endurance: fair       Vitals: room air    Blood Pressure at rest    Blood Pressure during session      Heart Rate at rest   Heart Rate during session     SPO2 at rest  %  SPO2 during session 98-99%     Patient education  Patient educated on role of Physical Therapy, risks of immobility, safety and plan of care and  importance of mobility while in hospital       Patient response to education:   Pt verbalized understanding Pt demonstrated skill Pt requires further education in this area   Yes Partial Yes      Treatment:  Patient practiced and was instructed/facilitated in the following treatment: Patient slouched on side on bed at beginning of treatment   Sat edge of bed 8 minutes with Supervision  to increase dynamic sitting balance and activity tolerance. stood and ambulated in room.  performed exercise as below. Therapeutic Exercises:  ankle pumps and long arc quad x 15      At end of session, patient sitting edge of bed  with   call light and phone within reach,   all lines and tubes intact, nursing notified. Patient would benefit from continued skilled Physical Therapy to improve functional independence and quality of life.        Patient's/ family goals   home        Time in  0  Time out  234    Total Treatment Time 13 minutes      CPT codes:    Therapeutic activities (61418)   13 minutes  1 unit(s)    Kb Wood, FRANCK   [facility]#[id number]

## 2021-06-29 NOTE — FLOWSHEET NOTE
ccpd completed without complications, aseptic technique used. Dressing changed, ATB applied to site. efflent drained clear yellow,no signs of infection. 1122 ml net UF       06/29/21 1100   Vitals   BP (!) 138/58   Temp 97.9 °F (36.6 °C)   Pulse 102   Resp 18   Peritoneal Dialysis Catheter Left lower abdomen   Placement Date/Time: 10/31/20 0704   Catheter Location: Left lower abdomen   Status Deaccessed   Site Condition No Complications   Dressing Status Clean;Dry; Intact; Changed   Dressing Gauze   Cycler   Verification of Prescription CCPD   Last Fill Volume 2000 mL   Ultrafiltration (UF) (mL) 1122 mL

## 2021-06-29 NOTE — PROGRESS NOTES
6/29/2021 10:17 AM  Service: Urology  Group: [facility] ([facility])    Fidel Parents  [id number]    Subjective:    She is refusing straight cath that was ordered yesterday  She is tolerating diet  Currently receiving peritoneal dialysis  No fever or chills  Review of Systems  Constitutional: No fever or chills   Respiratory: negative for cough and hemoptysis  Cardiovascular: negative for chest pain and dyspnea  Gastrointestinal: negative for abdominal pain, diarrhea, nausea and vomiting   : See above  Derm: negative for rash and skin lesion(s)  Neurological: negative for seizures and tremors  Musculoskeletal: Negative    Psychiatric: Negative   All other reviews are negative      Scheduled Meds:   epoetin nena-epbx  3,000 Units Subcutaneous Once per day on Mon Wed Fri    famotidine (PEPCID) injection  10 mg Intravenous Daily    sodium chloride flush  10 mL Intravenous 2 times per day    sodium chloride  15 mL/kg Intravenous Once    heparin (porcine)  5,000 Units Subcutaneous 3 times per day    metoclopramide  5 mg Oral TID    rOPINIRole  0.25 mg Oral Nightly    mirtazapine  15 mg Oral Nightly    hydrOXYzine  25 mg Oral Daily    ARIPiprazole  5 mg Oral Nightly    sevelamer  800 mg Oral TID WC    hydrALAZINE  10 mg Oral BID    metoprolol tartrate  25 mg Oral Daily    pantoprazole  40 mg Oral QAM AC    levothyroxine  75 mcg Oral Daily    folic acid  1 mg Oral Daily    piperacillin-tazobactam  3,375 mg Intravenous Q12H    insulin glargine  7 Units Subcutaneous Daily    insulin lispro  0-12 Units Subcutaneous TID WC    insulin lispro  0-6 Units Subcutaneous Nightly    gentamicin   Topical Daily    sodium chloride flush  5-40 mL Intravenous BID       Objective:  Vitals:    06/29/21 0831   BP: (!) 138/58   Pulse: 102   Resp:    Temp:    SpO2:          Allergies: Cefepime and Toradol [ketorolac tromethamine]    General Appearance: alert and oriented to person, place and time and in no acute distress  Skin: no rash or erythema  Head: normocephalic and atraumatic  Pulmonary/Chest: normal air movement, no respiratory distress  Abdomen: soft, non-tender, non-distended  Genitourinary: No Reyes  Extremities: no cyanosis, clubbing or edema         Labs:     Recent Labs     06/29/21  0600      K 4.4      CO2 25   BUN 52*   CREATININE 7.8*   GLUCOSE 284*   CALCIUM 8.9       Lab Results   Component Value Date    HGB 7.7 06/29/2021    HCT 25.5 06/29/2021         Assessment/Plan:  Possible Chronic Cystitis     Blood cultures no growth  Urine culture from 6/21/2021 unremarkable  Patient refusing repeat straight cath to determine bladder capacity as well as infection  She was educated about avoiding bladder stimulants as well as keeping a bladder diary log if she prefers manage bladder pain conservatively.  If she continues to have pain, advised straight cath and further outpatient eval for possible antispasmodics in the future should conservative measures not work  No further  interventions planned  Please call with questions or concerns     HEBER Duffy - CNP   [facility]  Urology

## 2021-06-30 LAB
ALBUMIN SERPL-MCNC: 3.5 G/DL (ref 3.5–5.2)
ALP BLD-CCNC: 116 U/L (ref 35–104)
ALT SERPL-CCNC: 25 U/L (ref 0–32)
ANION GAP SERPL CALCULATED.3IONS-SCNC: 13 MMOL/L (ref 7–16)
AST SERPL-CCNC: 23 U/L (ref 0–31)
BASOPHILS ABSOLUTE: 0.08 E9/L (ref 0–0.2)
BASOPHILS RELATIVE PERCENT: 1.4 % (ref 0–2)
BILIRUB SERPL-MCNC: <0.2 MG/DL (ref 0–1.2)
BUN BLDV-MCNC: 45 MG/DL (ref 6–20)
CALCIUM SERPL-MCNC: 9 MG/DL (ref 8.6–10.2)
CHLORIDE BLD-SCNC: 100 MMOL/L (ref 98–107)
CO2: 27 MMOL/L (ref 22–29)
CREAT SERPL-MCNC: 7.2 MG/DL (ref 0.5–1)
EOSINOPHILS ABSOLUTE: 0.38 E9/L (ref 0.05–0.5)
EOSINOPHILS RELATIVE PERCENT: 6.6 % (ref 0–6)
GFR AFRICAN AMERICAN: 8
GFR NON-AFRICAN AMERICAN: 8 ML/MIN/1.73
GLUCOSE BLD-MCNC: 222 MG/DL (ref 74–99)
HCT VFR BLD CALC: 23 % (ref 34–48)
HEMOGLOBIN: 7.3 G/DL (ref 11.5–15.5)
IMMATURE GRANULOCYTES #: 0.03 E9/L
IMMATURE GRANULOCYTES %: 0.5 % (ref 0–5)
LYMPHOCYTES ABSOLUTE: 1.93 E9/L (ref 1.5–4)
LYMPHOCYTES RELATIVE PERCENT: 33.3 % (ref 20–42)
MAGNESIUM: 2 MG/DL (ref 1.6–2.6)
MCH RBC QN AUTO: 31.3 PG (ref 26–35)
MCHC RBC AUTO-ENTMCNC: 31.7 % (ref 32–34.5)
MCV RBC AUTO: 98.7 FL (ref 80–99.9)
METER GLUCOSE: 237 MG/DL (ref 74–99)
METER GLUCOSE: 253 MG/DL (ref 74–99)
METER GLUCOSE: 282 MG/DL (ref 74–99)
METER GLUCOSE: 92 MG/DL (ref 74–99)
MONOCYTES ABSOLUTE: 0.5 E9/L (ref 0.1–0.95)
MONOCYTES RELATIVE PERCENT: 8.6 % (ref 2–12)
NEUTROPHILS ABSOLUTE: 2.88 E9/L (ref 1.8–7.3)
NEUTROPHILS RELATIVE PERCENT: 49.6 % (ref 43–80)
PDW BLD-RTO: 17 FL (ref 11.5–15)
PHOSPHORUS: 5.2 MG/DL (ref 2.5–4.5)
PLATELET # BLD: 321 E9/L (ref 130–450)
PMV BLD AUTO: 9.6 FL (ref 7–12)
POTASSIUM SERPL-SCNC: 4.5 MMOL/L (ref 3.5–5)
RBC # BLD: 2.33 E12/L (ref 3.5–5.5)
SODIUM BLD-SCNC: 140 MMOL/L (ref 132–146)
TOTAL PROTEIN: 5.4 G/DL (ref 6.4–8.3)
WBC # BLD: 5.8 E9/L (ref 4.5–11.5)

## 2021-06-30 PROCEDURE — 6370000000 HC RX 637 (ALT 250 FOR IP): Performed by: SPECIALIST

## 2021-06-30 PROCEDURE — 6370000000 HC RX 637 (ALT 250 FOR IP): Performed by: INTERNAL MEDICINE

## 2021-06-30 PROCEDURE — 99233 SBSQ HOSP IP/OBS HIGH 50: CPT | Performed by: INTERNAL MEDICINE

## 2021-06-30 PROCEDURE — 80053 COMPREHEN METABOLIC PANEL: CPT

## 2021-06-30 PROCEDURE — 2500000003 HC RX 250 WO HCPCS: Performed by: INTERNAL MEDICINE

## 2021-06-30 PROCEDURE — 6360000002 HC RX W HCPCS: Performed by: INTERNAL MEDICINE

## 2021-06-30 PROCEDURE — 36415 COLL VENOUS BLD VENIPUNCTURE: CPT

## 2021-06-30 PROCEDURE — 85025 COMPLETE CBC W/AUTO DIFF WBC: CPT

## 2021-06-30 PROCEDURE — 6360000002 HC RX W HCPCS: Performed by: NURSE PRACTITIONER

## 2021-06-30 PROCEDURE — 1200000000 HC SEMI PRIVATE

## 2021-06-30 PROCEDURE — 83735 ASSAY OF MAGNESIUM: CPT

## 2021-06-30 PROCEDURE — 82962 GLUCOSE BLOOD TEST: CPT

## 2021-06-30 PROCEDURE — 90945 DIALYSIS ONE EVALUATION: CPT

## 2021-06-30 PROCEDURE — 2580000003 HC RX 258: Performed by: INTERNAL MEDICINE

## 2021-06-30 PROCEDURE — 84100 ASSAY OF PHOSPHORUS: CPT

## 2021-06-30 RX ORDER — LINEZOLID 600 MG/1
600 TABLET, FILM COATED ORAL EVERY 12 HOURS SCHEDULED
Status: DISCONTINUED | OUTPATIENT
Start: 2021-06-30 | End: 2021-07-02 | Stop reason: HOSPADM

## 2021-06-30 RX ADMIN — MIRTAZAPINE 15 MG: 15 TABLET, FILM COATED ORAL at 21:59

## 2021-06-30 RX ADMIN — METOCLOPRAMIDE HYDROCHLORIDE 5 MG: 5 TABLET ORAL at 15:05

## 2021-06-30 RX ADMIN — METOCLOPRAMIDE HYDROCHLORIDE 5 MG: 5 TABLET ORAL at 08:37

## 2021-06-30 RX ADMIN — SEVELAMER CARBONATE 800 MG: 800 TABLET, FILM COATED ORAL at 08:36

## 2021-06-30 RX ADMIN — INSULIN LISPRO 3 UNITS: 100 INJECTION, SOLUTION INTRAVENOUS; SUBCUTANEOUS at 22:03

## 2021-06-30 RX ADMIN — LEVOTHYROXINE SODIUM 75 MCG: 75 TABLET ORAL at 06:04

## 2021-06-30 RX ADMIN — INSULIN LISPRO 6 UNITS: 100 INJECTION, SOLUTION INTRAVENOUS; SUBCUTANEOUS at 16:26

## 2021-06-30 RX ADMIN — HEPARIN SODIUM 5000 UNITS: 5000 INJECTION INTRAVENOUS; SUBCUTANEOUS at 05:21

## 2021-06-30 RX ADMIN — PIPERACILLIN SODIUM AND TAZOBACTAM SODIUM 3375 MG: 3; .375 INJECTION, POWDER, LYOPHILIZED, FOR SOLUTION INTRAVENOUS at 11:14

## 2021-06-30 RX ADMIN — OXYCODONE AND ACETAMINOPHEN 1 TABLET: 5; 325 TABLET ORAL at 16:26

## 2021-06-30 RX ADMIN — HYDRALAZINE HYDROCHLORIDE 10 MG: 10 TABLET, FILM COATED ORAL at 08:37

## 2021-06-30 RX ADMIN — PANTOPRAZOLE SODIUM 40 MG: 40 TABLET, DELAYED RELEASE ORAL at 06:03

## 2021-06-30 RX ADMIN — INSULIN LISPRO 4 UNITS: 100 INJECTION, SOLUTION INTRAVENOUS; SUBCUTANEOUS at 08:38

## 2021-06-30 RX ADMIN — FAMOTIDINE 10 MG: 10 INJECTION INTRAVENOUS at 08:44

## 2021-06-30 RX ADMIN — OXYCODONE AND ACETAMINOPHEN 1 TABLET: 5; 325 TABLET ORAL at 11:13

## 2021-06-30 RX ADMIN — METOCLOPRAMIDE HYDROCHLORIDE 5 MG: 5 TABLET ORAL at 21:59

## 2021-06-30 RX ADMIN — HYDRALAZINE HYDROCHLORIDE 10 MG: 10 TABLET, FILM COATED ORAL at 21:59

## 2021-06-30 RX ADMIN — ROPINIROLE HYDROCHLORIDE 0.25 MG: 0.25 TABLET, FILM COATED ORAL at 21:59

## 2021-06-30 RX ADMIN — INSULIN GLARGINE 7 UNITS: 100 INJECTION, SOLUTION SUBCUTANEOUS at 08:38

## 2021-06-30 RX ADMIN — HYDROXYZINE HYDROCHLORIDE 25 MG: 25 TABLET ORAL at 08:37

## 2021-06-30 RX ADMIN — HEPARIN SODIUM 5000 UNITS: 5000 INJECTION INTRAVENOUS; SUBCUTANEOUS at 22:04

## 2021-06-30 RX ADMIN — LINEZOLID 600 MG: 600 TABLET, FILM COATED ORAL at 08:37

## 2021-06-30 RX ADMIN — LINEZOLID 600 MG: 600 TABLET, FILM COATED ORAL at 21:59

## 2021-06-30 RX ADMIN — FOLIC ACID 1 MG: 1 TABLET ORAL at 08:36

## 2021-06-30 RX ADMIN — SEVELAMER CARBONATE 800 MG: 800 TABLET, FILM COATED ORAL at 16:26

## 2021-06-30 RX ADMIN — OXYCODONE AND ACETAMINOPHEN 1 TABLET: 5; 325 TABLET ORAL at 05:21

## 2021-06-30 RX ADMIN — EPOETIN ALFA-EPBX 3000 UNITS: 3000 INJECTION, SOLUTION INTRAVENOUS; SUBCUTANEOUS at 08:37

## 2021-06-30 RX ADMIN — Medication 10 ML: at 22:08

## 2021-06-30 RX ADMIN — ARIPIPRAZOLE 5 MG: 5 TABLET ORAL at 21:58

## 2021-06-30 RX ADMIN — HEPARIN SODIUM 5000 UNITS: 5000 INJECTION INTRAVENOUS; SUBCUTANEOUS at 15:05

## 2021-06-30 RX ADMIN — METOPROLOL TARTRATE 25 MG: 25 TABLET, FILM COATED ORAL at 08:36

## 2021-06-30 RX ADMIN — SEVELAMER CARBONATE 800 MG: 800 TABLET, FILM COATED ORAL at 11:13

## 2021-06-30 RX ADMIN — PIPERACILLIN SODIUM AND TAZOBACTAM SODIUM 3375 MG: 3; .375 INJECTION, POWDER, LYOPHILIZED, FOR SOLUTION INTRAVENOUS at 22:02

## 2021-06-30 RX ADMIN — Medication 10 ML: at 08:44

## 2021-06-30 ASSESSMENT — PAIN SCALES - GENERAL
PAINLEVEL_OUTOF10: 3
PAINLEVEL_OUTOF10: 9
PAINLEVEL_OUTOF10: 8
PAINLEVEL_OUTOF10: 6

## 2021-06-30 NOTE — CARE COORDINATION
Cm note; awaiting infectious disease consult which was placed yesterday for possible chronic cystitis, persistent UTI, has been on IV Zosyn for 8 days. Pt does her own PD at home at night, gets supplies from [employer]. Plan remains to return home, pt had initially denied any discharge needs but CM will continue to follow for ID recommendations for antibiotics.

## 2021-06-30 NOTE — FLOWSHEET NOTE
Pt connected to [facility] cycler, draining well effluent clear. Pt did not have dressing on PD cath, she states it fell off during her shower. Site cleansed using aseptic tehnique and new dressing applied.

## 2021-06-30 NOTE — PROGRESS NOTES
Department of Internal Medicine  Nephrology Attending Progress Note    Events reviewed. SUBJECTIVE: We are following [address] [place] [address] for ESRD on peritoneal dialysis. Reports complaints of abdominal pain.      PHYSICAL EXAM:      Vitals:    VITALS:  BP (!) 157/82   Pulse 109   Temp 98 °F (36.7 °C)   Resp 16   Ht 5' 4\" (1.626 m)   Wt 136 lb 11 oz (62 kg)   LMP 02/21/2021   SpO2 98%   BMI 23.46 kg/m²   24HR INTAKE/OUTPUT:      Intake/Output Summary (Last 24 hours) at 6/30/2021 1148  Last data filed at 6/30/2021 1131  Gross per 24 hour   Intake 880 ml   Output 0 ml   Net 880 ml       PD Catheter Exam:  PD catheter exit site clean    Constitutional: Awake in no acute distress  HEENT:  Normocephalic, PERRL  Respiratory: Decreased breath sounds on the basis  Cardiovascular/Edema:  RRR, S1/S2  Gastrointestinal:  Soft, PD catheter exit site clean   Neurologic:  Nonfocal, AVELAR  Skin:  Warm, dry, no lesions  Other:  no edema    Scheduled Meds:   linezolid  600 mg Oral 2 times per day    epoetin nena-epbx  3,000 Units Subcutaneous Once per day on Mon Wed Fri    famotidine (PEPCID) injection  10 mg Intravenous Daily    sodium chloride flush  10 mL Intravenous 2 times per day    sodium chloride  15 mL/kg Intravenous Once    heparin (porcine)  5,000 Units Subcutaneous 3 times per day    metoclopramide  5 mg Oral TID    rOPINIRole  0.25 mg Oral Nightly    mirtazapine  15 mg Oral Nightly    hydrOXYzine  25 mg Oral Daily    ARIPiprazole  5 mg Oral Nightly    sevelamer  800 mg Oral TID WC    hydrALAZINE  10 mg Oral BID    metoprolol tartrate  25 mg Oral Daily    pantoprazole  40 mg Oral QAM AC    levothyroxine  75 mcg Oral Daily    folic acid  1 mg Oral Daily    piperacillin-tazobactam  3,375 mg Intravenous Q12H    insulin glargine  7 Units Subcutaneous Daily    insulin lispro  0-12 Units Subcutaneous TID WC    insulin lispro  0-6 Units Subcutaneous Nightly    gentamicin   Topical Daily    sodium chloride flush  5-40 mL Intravenous BID     Continuous Infusions:   sodium chloride      dextrose      insulin Stopped (06/22/21 1235)    sodium chloride 25 mL (06/23/21 1633)    sodium chloride Stopped (06/22/21 0804)    dextrose 5 % and 0.9 % NaCl Stopped (06/22/21 1235)    sodium chloride Stopped (06/29/21 1714)     PRN Meds:.sodium chloride, glucose, dextrose, glucagon (rDNA), dextrose, sodium chloride flush, sodium chloride, promethazine **OR** ondansetron, acetaminophen **OR** acetaminophen, polyethylene glycol, albuterol, oxyCODONE-acetaminophen, heparin flush    DATA:    CBC:   Lab Results   Component Value Date    WBC 5.8 06/30/2021    RBC 2.33 06/30/2021    HGB 7.3 06/30/2021    HCT 23.0 06/30/2021    MCV 98.7 06/30/2021    MCH 31.3 06/30/2021    MCHC 31.7 06/30/2021    RDW 17.0 06/30/2021     06/30/2021    MPV 9.6 06/30/2021     CMP:    Lab Results   Component Value Date     06/30/2021    K 4.5 06/30/2021    K 4.0 06/22/2021     06/30/2021    CO2 27 06/30/2021    BUN 45 06/30/2021    CREATININE 7.2 06/30/2021    GFRAA 8 06/30/2021    LABGLOM 8 06/30/2021    GLUCOSE 222 06/30/2021    GLUCOSE 130 05/18/2012    PROT 5.4 06/30/2021    LABALBU 3.5 06/30/2021    LABALBU 4.1 05/18/2012    CALCIUM 9.0 06/30/2021    BILITOT <0.2 06/30/2021    ALKPHOS 116 06/30/2021    AST 23 06/30/2021    ALT 25 06/30/2021     Magnesium:    Lab Results   Component Value Date    MG 2.0 06/30/2021     Phosphorus:    Lab Results   Component Value Date    PHOS 5.2 06/30/2021     Radiology Review:      Chest x-ray June 21, 2021   No acute process.           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 In the emergency room she was found to have a glucose level of 874, beta hydroxybutyrate of 3.94. She was admitted to MICU. Problems resolved. · Severe hypoalbuminemia/malnutrition      IMPRESSION/RECOMMENDATIONS:      1. ESRD on peritoneal dialysis, to continue CCPD 4 exchanges over 10 hours of 1.5% with long dwell of 2.5%  2. HTN, on hydralazine 10 mg twice daily and metoprolol 25 mg daily  -------------------------------------------------------  3. UTI, on piperacillin-tazobactam and linezolid   4. MBD of CKD, on sevelamer  5.  Anemia of CKD, on epoetin alpha      Plan:    · Continue CCPD 4 exchanges over 10 hours of 1.5% with long dwell of 2.5%  · Continue to monitor electrolytes  · Continue epoetin alpha 3000 units 3 times a week  · Continue gentamycin cream to PD site  · Discharge planning       Electronically signed by HEBER Sunshine CNP on 6/30/2021 at 3:16 PM

## 2021-06-30 NOTE — PROGRESS NOTES
PRN  sodium chloride, 25 mL, PRN  promethazine, 12.5 mg, Q6H PRN   Or  ondansetron, 4 mg, Q6H PRN  acetaminophen, 650 mg, Q6H PRN   Or  acetaminophen, 650 mg, Q6H PRN  polyethylene glycol, 17 g, Daily PRN  albuterol, 2.5 mg, Q6H PRN  oxyCODONE-acetaminophen, 1 tablet, Q4H PRN  heparin flush, 100 Units, PRN         Objective:    BP (!) 157/82   Pulse 109   Temp 98 °F (36.7 °C)   Resp 16   Ht 5' 4\" (1.626 m)   Wt 136 lb 11 oz (62 kg)   LMP 02/21/2021   SpO2 98%   BMI 23.46 kg/m²   General Appearance: Alert and oriented x3. In no acute distress  Skin: warm and dry  Head: normocephalic and atraumatic  Eyes: pupils equal, round, and reactive to light, extraocular eye movements intact, conjunctivae normal  Neck: neck supple and non tender without mass   Pulmonary/Chest: Nonlabored on room air. Clear to auscultation bilaterally. Cardiovascular: normal rate, normal S1 and S2 and no carotid bruits  Abdomen: Soft, lower abdominal tenderness without peritoneal signs, nondistended, normal bowel sounds. Peritoneal dialysis catheter in place. No palpable organomegaly.   No costovertebral angle tenderness Extremities: no cyanosis, no clubbing and no edema, left leg scar from prior ulcerating wound but no open wound  Neurologic: no cranial nerve deficit and speech normal      Recent Labs     06/28/21  0550 06/29/21  0600 06/30/21  0500    141 140   K 4.2 4.4 4.5    100 100   CO2 26 25 27   BUN 38* 52* 45*   CREATININE 6.9* 7.8* 7.2*   GLUCOSE 237* 284* 222*   CALCIUM 8.8 8.9 9.0       Recent Labs     06/28/21  0550 06/29/21  0600 06/30/21  0500   ALKPHOS 124* 120* 116*   PROT 5.4* 5.6* 5.4*   LABALBU 3.6 3.6 3.5   BILITOT <0.2 <0.2 <0.2   AST 25 30 23   ALT 23 27 25       Recent Labs     06/28/21  0550 06/29/21  0600 06/30/21  0500   WBC 7.2 6.4 5.8   RBC 2.56* 2.55* 2.33*   HGB 7.9* 7.7* 7.3*   HCT 24.7* 25.5* 23.0*   MCV 96.5 100.0* 98.7   MCH 30.9 30.2 31.3   MCHC 32.0 30.2* 31.7*   RDW 16.1* 16.8* 17.0*  333 321   MPV 9.6 9.6 9.6       Radiology:   CT ABDOMEN PELVIS WO CONTRAST Additional Contrast? Oral   Final Result   Circumferential wall thickening of the urinary bladder, consistent with the   patient's known diagnosis of acute cystitis. Otherwise, no evidence of an   inflammatory or obstructive process in the abdomen or pelvis to explain pain. Moderate volume of free fluid throughout the abdomen/pelvis, likely related   to dialysate, given the peritoneal dialysis catheter in the deep pelvis. Nonspecific patchy bilateral ground-glass opacities at the lung bases. These   are nonspecific and may be on the basis of an infectious or inflammatory   pneumonitis. XR CHEST PORTABLE   Final Result   No acute process. Assessment/Plan:  Principal Problem:    DKA, type 1, not at goal [facility])  Active Problems:    Uncontrolled type 1 diabetes mellitus with hyperglycemia, with long-term current use of insulin ([facility] [address]    ESRD on peritoneal dialysis ([facility] [address]    Acute cystitis    Hypertension    Hyperosmolality    Major depressive disorder  Resolved Problems:    * No resolved hospital problems. *      1. HHS/possible early DKA in the setting of uncontrolled type 1 DM   -resolved with IV fluids and insulin drip protocol  -Continue subcutaneous insulin regimen. Glucose has been reasonably controlled    2. Recurrent UTI/acute cystitis   -Ceftriaxone changed to Zosyn (day #9) given Enterococcus infection in the past  -Urine culture shows mixed gram-positive organisms. Repeat urinalysis and culture was ordered, however she only makes urine once every few days.    -Other hospitalist was consider infectious disease given frequent urinary tract infections as she may need chronic prophylactic antibiotics. However, I decided to go with urology consult to evaluate for interstitial cystitis. They saw patient and felt she possibly had chronic cystitis but she declined straight cath.  She did void this morning and it showed large LE and packed WBC and many bacteria. -Given persistent UTI despite 8 days of Zosyn, ID was consulted yesterday. They added Zyvox as urine Cx came back ,000 CFU/ml. 3.  Abdominal pain and diarrhea  -Given the fact that she receives frequent IV antibiotics,  C. Difficile cancelled. - CT of the abdomen pelvis no acute changes some thickening in the bladder due to cystitis. There is some free fluid in the abdomen, however this is consistent with her peritoneal dialysis  -She is still having significant pain. Dose of fentanyl increased Saturday, but frequency decreased. I stopped this morning and hope pain improves on new antibiotic. 4.  ESRD on peritoneal dialysis   -Nephrology following for PD management    5. Uncontrolled type I DM  Off insulin drip; continue subcutaneous insulin regimen    6. Essential hypertension   -Continue home hydralazine and metoprolol tartrate. Pressure variable but reasonably controlled    7. Depression  -Continue home Abilify and Remeron    8. Hypothyroidism  -continue home levothyroxine    9. Disposition  -home tomorrow if have final culture available. DVT prophylaxis: Subcutaneous heparin  CODE STATUS: Full code     Case discussed with Dr. Tipton Done of ID earlier today. NOTE: This report was transcribed using voice recognition software. Every effort was made to ensure accuracy; however, inadvertent computerized transcription errors may be present.      Electronically signed by Kaya Coulter MD on 6/30/2021 at 4:28 PM

## 2021-06-30 NOTE — CONSULTS
[facility]  Consult Note    [address] [facility] [address]  [place], [place] [email or web address]  Phone [phone]   [phone]      Admit Date: 2021 10:37 PM  Pt Name: Neeraj Figueroa  MRN: [id number]  : 1990  Reason for Consult:    Chief Complaint   Patient presents with    Hyperglycemia     bgl reading \"high\", nausea without emesis, bodyaches, 6.6 K+ at dialysis on friday     Requesting Physician:  Barbara Singletary MD  PCP: Miladis Gillette MD  History Obtained From:  patient, chart   ID consulted for DKA, type 1, not at goal [facility]) [E10.10]  Hyperosmolality [E87.0]  on hospital day 8   Face to face encounter   [facility]       Chief Complaint   Patient presents with    Hyperglycemia     bgl reading \"high\", nausea without emesis, bodyaches, 6.6 K+ at dialysis on friday     [address] [facility] is a 36 y.o. female who presents with significant past medical history of  has a past medical history of Acute congestive heart failure ([address] BC (acute kidney injury) ([address] Cardiac arrest ([address] Cephalgia, Chronic kidney disease, Depression, Diabetes mellitus ([address] Diabetic gastroparesis associated with type 1 diabetes mellitus ([address] Diabetic ketoacidosis ([address] Diabetic ketoacidosis with coma associated with type 1 diabetes mellitus ([address] Diabetic polyneuropathy associated with type 1 diabetes mellitus ([address] Drug use complicating pregnancy in third trimester, Endocarditis, ESRD (end stage renal disease) ([address] Hemodialysis patient ([address] History of blood transfusion, Hyperlipidemia, Hyperosmolar hyperglycemic state (HHS) ([address] Hypothyroidism, Iron deficiency anemia, MDRO (multiple drug resistant organisms) resistance, MRSA (methicillin resistant Staphylococcus aureus), Non compliance w medication regimen, Other disorders of kidney and ureter, Pregnancy, Previous  delivery affecting pregnancy, antepartum, Previous stillbirth or demise, antepartum, Seizure ([facility] Severe pre-eclampsia in third trimester, Shock liver, and Valvular endocarditis. ED TRIAGEVITALS  BP: (!) 157/82, Temp: 98 °F (36.7 °C), Pulse: 109, Resp: 16, SpO2: 98 %  HPI  Patient was admitted to ER on 6/21/2021- for weakness, hyperglycemic state, fatigue, and arthralgias. Patient has a history of MRSA UTI in May- she also had a left leg wound and was treated for MRSA wound infection with bactrim. She now reports ++ dysuria, + pelvic pain. She is unsure how much urine she makes- she is ESRD on PD dialysis. She has been on zosyn IV and reports her dysuria and pain has not improved any. She denies any fevers or chills, ++ fatigue. Patient is known to ID - was last seen in May of 2021- she was seen for a left leg surgical wound with MRSA- she had a debridement and biopsy done. She was discharge with bactrim- she has a history of urinary retention. She has not been on any atbs since that bactrim    Id has been asked to evaluate and manage antibiotics.     She has been on IV zosyn   REVIEW OF SYSTEMS     CONSTITUTIONAL:   No fever, chills, weight loss  ALLERGIES:    No urticaria, hay fever,    EYES:     No blurry vision, loss of vision,eye pain  ENT:      No hearing loss, sore throat  CARDIOVASCULAR:   No chest pain or palpitations  RESPIRATORY:   No cough, sob  ENDOCRINE:    No increase thirst, urination , + diabetes   HEME-LYMPH:   No easy bruising or bleeding  GI:     No nausea, vomiting or diarrhea  :     ++ dysuria, PD - ESRD  NEURO:    No seizures, stroke, HA  MUSCULOSKELETAL:  No muscle aches or pain, no joint pain  SKIN:     No rash or itch  PSYCH:   ++  depression or anxiety    CURRENT MEDICATIONS     Current Facility-Administered Medications:     linezolid (ZYVOX) tablet 600 mg, 600 mg, Oral, 2 times per day, Torito Carpenter MD, 600 mg at 06/30/21 0837    epoetin nena-epbx (RETACRIT) injection 3,000 Units, 3,000 Units, Subcutaneous, Once per day on 5 mg, 5 mg, Oral, TID, Venu Aguirre MD, 5 mg at 06/30/21 0837    rOPINIRole (REQUIP) tablet 0.25 mg, 0.25 mg, Oral, Nightly, Venu Aguirre MD, 0.25 mg at 06/29/21 2121    mirtazapine (REMERON) tablet 15 mg, 15 mg, Oral, Nightly, Venu Aguirre MD, 15 mg at 06/29/21 2121    hydrOXYzine (ATARAX) tablet 25 mg, 25 mg, Oral, Daily, Venu Aguirre MD, 25 mg at 06/30/21 0837    albuterol (PROVENTIL) nebulizer solution 2.5 mg, 2.5 mg, Nebulization, Q6H PRN, Venu Aguirre MD    ARIPiprazole (ABILIFY) tablet 5 mg, 5 mg, Oral, Nightly, Venu Aguirre MD, 5 mg at 06/29/21 2122    sevelamer (RENVELA) tablet 800 mg, 800 mg, Oral, TID WC, Venu Aguirre MD, 800 mg at 06/30/21 0836    hydrALAZINE (APRESOLINE) tablet 10 mg, 10 mg, Oral, BID, Venu Aguirre MD, 10 mg at 06/30/21 0837    metoprolol tartrate (LOPRESSOR) tablet 25 mg, 25 mg, Oral, Daily, Venu Aguirre MD, 25 mg at 06/30/21 0836    pantoprazole (PROTONIX) tablet 40 mg, 40 mg, Oral, QAM AC, Venu Aguirre MD, 40 mg at 06/30/21 0603    levothyroxine (SYNTHROID) tablet 75 mcg, 75 mcg, Oral, Daily, Venu Aguirre MD, 75 mcg at 33/00/78 0653    folic acid (FOLVITE) tablet 1 mg, 1 mg, Oral, Daily, Venu Aguirre MD, 1 mg at 06/30/21 0836    oxyCODONE-acetaminophen (PERCOCET) 5-325 MG per tablet 1 tablet, 1 tablet, Oral, Q4H PRN, Venu Aguirre MD, 1 tablet at 06/30/21 0521    dextrose 5 % and 0.9 % sodium chloride infusion, , Intravenous, Continuous, Sandhya [place], APRN - CNP, Stopped at 06/22/21 1235    piperacillin-tazobactam (ZOSYN) 3,375 mg in dextrose 5 % 50 mL IVPB extended infusion (mini-bag), 3,375 mg, Intravenous, Q12H, Stopped at 06/30/21 0238 **AND** 0.9 % sodium chloride infusion, , Intravenous, Q12H, Burt Buckner MD, Stopped at 06/29/21 1714    insulin glargine (LANTUS) injection vial 7 Units, 7 Units, Subcutaneous, Daily, Burt Buckner MD, 7 Units at 06/30/21 0838    insulin lispro (HUMALOG) injection vial deficiency anemia 10/1/2019    MDRO (multiple drug resistant organisms) resistance     MRSA (methicillin resistant Staphylococcus aureus)     back wound abcess    Non compliance w medication regimen 3/30/2016    Other disorders of kidney and ureter     Pregnancy 3/30/2016    16 weeks    Previous  delivery affecting pregnancy, antepartum 3/2/2017    Previous stillbirth or demise, antepartum 2016    Seizure ([address] 2020    Severe pre-eclampsia in third trimester 2016    Shock liver 2/15/2021    Valvular endocarditis 11/10/2020    This Diagnosis was added to the Problem List based on transcribed orders from Dr. India Cortes       Past Surgical History:   Procedure Laterality Date    BACK SURGERY      abscess     SECTION      x2    CHOLECYSTECTOMY, LAPAROSCOPIC N/A 2019    CHOLECYSTECTOMY LAPAROSCOPIC performed by Wendy Kent MD at [address] [place] [address] N/A 2018    COLONOSCOPY WITH BIOPSY performed by Brenda Damico MD at [address] [place] [address] COLONOSCOPY N/A 2018    COLONOSCOPY WITH BIOPSY performed by Sweetie Urban MD at [address] [place] [address] ECHO COMPL [address]  2012    EF 57%    ECHO COMPL W DOP COLOR FLOW  6/10/2013         EMBOLECTOMY N/A 2020    [address] [place] [address], [id number] [place] [address], YULISSA -- REQS ROOM 3 performed by Jef Ahumada MD at [address] [place] [address] Left 2021    LEFT LEG INCISION AND DRAINAGE, DEBRIDEMENT, WOUND VAC APPLICATION performed by Jelly Pascual DPM at [address] [place] [address] Left 2021    LEFT LEG DEBRIDEMENT BIOPSY POSS APPLICATION WOUND VAC performed by Jelly Pascual DPM at [place] [address] N/A 2020    LAPAROSCOPIC INSERTION PERITONEAL DIALYSIS CATHETER performed by Feliz Coyne MD at [address] [place] [address] ESOPHAGOGASTRODUODENOSCOPY TRANSORAL DIAGNOSTIC N/A 2018    EGD ESOPHAGOGASTRODUODENOSCOPY performed by Brenda Damico MD at [address] [place] [address] TRANSESOPHAGEAL ECHOCARDIOGRAM N/A 10/19/2020    TRANSESOPHAGEAL ECHOCARDIOGRAM WITH BUBBLE STUDY performed by Cuco Elias MD at [address] [facility] TUNNELED VENOUS PORT PLACEMENT  2018    UPPER GASTROINTESTINAL ENDOSCOPY  2018    EGD BIOPSY performed by Norberto Dolan MD at [address] [facility] N/A 10/11/2019    EGD ESOPHAGOGASTRODUODENOSCOPY performed by Shirley Figueroa DO at [address]       Family History   Problem Relation Age of Onset    Asthma Mother     Hypertension Mother     High Blood Pressure Mother     Diabetes Mother     Asthma Brother     High Blood Pressure Father      SOCIAL HISTORY       Social History     Socioeconomic History    Marital status: Single     Spouse name: Not on file    Number of children: 2    Years of education: Not on file    Highest education level: Not on file   Occupational History    Not on file   Tobacco Use    Smoking status: Former Smoker     Packs/day: 0.50     Years: 6.00     Pack years: 3.00     Types: Cigarettes     Quit date: 2021     Years since quittin.4    Smokeless tobacco: Never Used    Tobacco comment: vaper cig   Vaping Use    Vaping Use: Never used   Substance and Sexual Activity    Alcohol use: No    Drug use: No     Comment: documented prior history of opioid abuse    Sexual activity: Yes     Partners: Male   Other Topics Concern    Not on file   Social History Narrative    Not on file     Social Determinants of Health     Financial Resource Strain: High Risk    Difficulty of Paying Living Expenses: Very hard   Food Insecurity: No Food Insecurity    Worried About Running Out of Food in the Last Year: Never true    [place] of Food in the Last Year: Never true   Transportation Needs: No Transportation Needs    Lack of Transportation (Medical): No    Lack of Transportation (Non-Medical):  No   Physical Activity:     Days of Exercise per Week:     Minutes of Exercise per Session:    Stress:     Feeling of Stress :    Social Connections:     Frequency of Communication with Friends and Family:     Frequency of Social Gatherings with Friends and Family:     Attends Protestant Services:     Active Member of Clubs or Organizations:     Attends Club or Organization Meetings:     Marital Status:    Intimate Partner Violence:     Fear of Current or Ex-Partner:     Emotionally Abused:     Physically Abused:     Sexually Abused:      · Has 2 children 4 and 5  · Lives in apart. PHYSICAL EXAM       ED Triage Vitals [06/21/21 2110]   BP Temp Temp Source Pulse Resp SpO2 Height Weight   (!) 97/58 97.9 °F (36.6 °C) Oral 110 20 98 % 5' 4\" (1.626 m) 138 lb (62.6 kg)     Vitals:    Vitals:    06/29/21 1718 06/29/21 2128 06/30/21 0519 06/30/21 0800   BP: (!) 146/77 (!) 99/53 (!) 160/86 (!) 157/82   Pulse: 105  111 109   Resp: 18  16 16   Temp: 97.9 °F (36.6 °C)  97.7 °F (36.5 °C) 98 °F (36.7 °C)   TempSrc: Oral  Oral    SpO2: 96%  98% 98%   Weight:       Height:         Physical Exam   Constitutional/General: Alert and oriented, NAD, thin   Head: NC/AT  Eyes: PERRL, EOMI  Mouth: Normal mucosa, no thrush   Neck: Supple, full ROM,    Pulmonary: Lungs clear to auscultation bilaterally. Not in respiratory distress  Cardiovascular:  Regular rate and rhythm  Abdomen: Soft, + BS.  softly distended. PD cath noted- dressing in place- no tenderness with palpation. No flank pain. Extremities: Moves all extremities x 4. Warm and well perfused  Left leg with healed incision/ wound noted- no open areas. No erythema. No edema. Skin: Warm and dry without rash  Scars to chest walls from previous HD lines.    Neurologic:    No focal deficits  Psych: Normal Affect     DIAGNOSTIC RESULTS   RADIOLOGY:   CT ABDOMEN PELVIS WO CONTRAST Additional Contrast? Oral    Result Date: 6/24/2021  EXAMINATION: CT OF THE ABDOMEN AND PELVIS WITHOUT CONTRAST 6/24/2021 6:00 pm TECHNIQUE: CT of the abdomen and pelvis was performed without the administration of intravenous contrast. Multiplanar reformatted images are provided for review. Dose modulation, iterative reconstruction, and/or weight based adjustment of the mA/kV was utilized to reduce the radiation dose to as low as reasonably achievable. COMPARISON: July 18, 2020 HISTORY: ORDERING SYSTEM PROVIDED HISTORY: severe abdominal pain, known UTI, diarrhea TECHNOLOGIST PROVIDED HISTORY: Reason for exam:->severe abdominal pain, known UTI, diarrhea Additional Contrast?->Oral FINDINGS: Lower Chest: Heart size is within normal limits. Overall decreased density of blood pool is suggestive of anemia. Mild scattered ground-glass opacities in the lung bases is a nonspecific finding. There are multiple bilateral subacute to chronic rib fractures. Organs: The liver demonstrates diffusely increased attenuation, findings which may be on the basis of iron deposition disorder or potentially amiodarone treatment. The gallbladder is surgically absent. The spleen and adrenal glands are normal in size. The pancreas has an unremarkable unenhanced appearance. The kidneys are mildly atrophic bilaterally for the patient's age. Punctate bilateral nonobstructing renal calculi are present. GI/Bowel: No evidence of a bowel obstruction, free air or pneumatosis. Portions the colon are decompressed, limiting assessment for mucosal based abnormalities. Please note that evaluation for pericolonic inflammatory changes is limited secondary to a moderate amount of free fluid in the abdomen/pelvis, likely related to dialysate, given there is a peritoneal dialysis catheter which terminates in the right hemipelvis. Pelvis: The urinary bladder is distended with circumferential wall thickening, in keeping with the provided history of urinary tract infection. The uterus and ovaries are grossly unremarkable. A moderate amount of free fluid is present in the pelvis.   No definite pelvic lymphadenopathy. Peritoneum/Retroperitoneum: The abdominal aorta is normal in caliber. No retroperitoneal lymphadenopathy or mass. Bones/Soft Tissues: No acute osseous abnormality or evidence of an aggressive osseous lesion. Circumferential wall thickening of the urinary bladder, consistent with the patient's known diagnosis of acute cystitis. Otherwise, no evidence of an inflammatory or obstructive process in the abdomen or pelvis to explain pain. Moderate volume of free fluid throughout the abdomen/pelvis, likely related to dialysate, given the peritoneal dialysis catheter in the deep pelvis. Nonspecific patchy bilateral ground-glass opacities at the lung bases. These are nonspecific and may be on the basis of an infectious or inflammatory pneumonitis. XR CHEST PORTABLE    Result Date: 6/21/2021  EXAMINATION: ONE XRAY VIEW OF THE CHEST 6/21/2021 10:49 pm COMPARISON: 05/14/2021 HISTORY: ORDERING SYSTEM PROVIDED HISTORY: sob, hyperglycemia TECHNOLOGIST PROVIDED HISTORY: Reason for exam:->sob, hyperglycemia FINDINGS: The lungs are without acute focal process. There is no effusion or pneumothorax. The cardiomediastinal silhouette is without acute process. The osseous structures are without acute process. No acute process.      LABS  Recent Labs     06/28/21  0550 06/29/21  0600 06/30/21  0500   WBC 7.2 6.4 5.8   HGB 7.9* 7.7* 7.3*   HCT 24.7* 25.5* 23.0*   MCV 96.5 100.0* 98.7    333 321     Recent Labs     06/28/21  0550 06/28/21  0550 06/29/21  0600 06/29/21  0600 06/30/21  0500     --  141  --  140   K 4.2   < > 4.4   < > 4.5      < > 100   < > 100   CO2 26   < > 25   < > 27   BUN 38*  --  52*  --  45*   CREATININE 6.9*  --  7.8*  --  7.2*   GFRAA 9  --  7  --  8   LABGLOM 9  --  7  --  8   GLUCOSE 237*  --  284*  --  222*   PROT 5.4*  --  5.6*  --  5.4*   LABALBU 3.6  --  3.6  --  3.5   CALCIUM 8.8  --  8.9  --  9.0   BILITOT <0.2  --  <0.2  --  <0.2   ALKPHOS 124*  --  120*  -- 116*   AST 25  --  30  --  23   ALT 23  --  27  --  25    < > = values in this interval not displayed. No results for input(s): PROCAL in the last 72 hours. Lab Results   Component Value Date    CRP 12.8 (H) 02/22/2021    CRP 2.5 (H) 10/31/2020    CRP 43.1 (H) 11/01/2019     Lab Results   Component Value Date    SEDRATE 95 (H) 02/22/2021    SEDRATE 35 (H) 10/31/2020    SEDRATE 19 10/13/2020     No results found for: [id number]  Lab Results   Component Value Date    COVID19 Not Detected 05/14/2021    COVID19 Not Detected 11/25/2020     COVID-19/ISAIAH-COV2 LABS  Recent Labs     06/28/21  0550 06/29/21  0600 06/30/21  0500   AST 25 30 23   ALT 23 27 25     Lab Results   Component Value Date    CHOL 95 01/14/2020    TRIG 82 01/14/2020    HDL 33 01/14/2020    LDLCALC 46 01/14/2020    LABVLDL 16 01/14/2020     Lab Results   Component Value Date    HEPAIGM Non-Reactive 02/13/2020    HEPBIGM Non-Reactive 11/01/2020    HCVABI Non-Reactive 02/13/2020     Hep C Ab Interp   Date Value Ref Range Status   02/13/2020 Non-Reactive NON REACT Final     MICROBIOLOGY:  Cultures :   Lab Results   Component Value Date    BC 5 Days no growth 06/21/2021    BC 5 Days no growth 05/14/2021    BC 5 Days no growth 02/18/2021    ORG Yeast 05/18/2021    ORG Staphylococcus aureus 05/18/2021    ORG Nadine albicans 05/18/2021     Lab Results   Component Value Date    BLOODCULT2 5 Days no growth 06/21/2021    BLOODCULT2 5 Days no growth 05/14/2021    BLOODCULT2 5 Days no growth 02/18/2021    ORG Yeast 05/18/2021    ORG Staphylococcus aureus 05/18/2021    ORG Nadine albicans 05/18/2021       WOUND/ABSCESS   Date Value Ref Range Status   05/16/2021   Final    Heavy growth  Refer to previous culture (CXWND: Leg-Left collected on 5-14-21 at 1426)  for susceptibility results     05/14/2021   Final    Heavy growth  Methicillin resistant Staph aureus isolated.  Most Methicillin  resistant Staphylococcus are usually resistant to multiple  antibiotics including other B-Lactams, Aminoglycosides,  Macrolides, Clindamycin and Tetracycline. Contact isolation  is indicated. 02/22/2021 Growth not present  Final       Smear, Respiratory   Date Value Ref Range Status   02/18/2021   Final    Group 6: <25 PMN's/LPF and <25 Epithelial cells/LPF  Rare Polymorphonuclear leukocytes  Rare Epithelial cells  Few Gram positive diplococci  Rare Gram negative rods           Component Value Date/Time    AFBCX  05/18/2021 0727     No growth after 1 week/s of incubation. No growth after 2 week/s of incubation. No growth after 3 week/s of incubation. No growth after 4 week/s of incubation. No growth after 5 week/s of incubation. FUNGSM 1+ Yeast 05/18/2021 0727    LABFUNG  05/18/2021 0727     Moderate growth  No further workup  Unable to identify be conventional methods      LABFUNG No growth after 4 weeks of incubation. 11/06/2020 1142     CULTURE, RESPIRATORY   Date Value Ref Range Status   02/18/2021 Oral Pharyngeal Celine reduced (A)  Final   02/18/2021 Rare growth  Final   02/18/2021 Moderate growth  Final     No results found for: CXCATHTIP  Body Fluid Culture, Sterile   Date Value Ref Range Status   10/20/2020   Final    Neisseria gonorrhoeae not isolated  Growth not present       Culture Surgical   Date Value Ref Range Status   05/18/2021   Final    Heavy growth  Methicillin resistant Staph aureus isolated. Most Methicillin  resistant Staphylococcus are usually resistant to multiple  antibiotics including other B-Lactams, Aminoglycosides,  Macrolides, Clindamycin and Tetracycline. Contact isolation  is indicated.      05/18/2021 Light growth  Final   02/23/2021 Growth not present  Final     Urine Culture, Routine   Date Value Ref Range Status   06/21/2021 <10,000 CFU/mL  Mixed gram positive organisms    Final   05/16/2021 <10,000 CFU/mL  Mixed gram positive organisms    Final   11/24/2020 Growth not present  Final     MRSA Culture Only   Date Value Ref Range Status 12/27/2020 Methicillin resistant Staph aureus not isolated  Final   10/13/2020 Methicillin resistant Staph aureus not isolated  Final   10/30/2019 Methicillin resistant Staph aureus not isolated  Final     Patient is a 36 y.o. female who presented with   Chief Complaint   Patient presents with    Hyperglycemia     bgl reading \"high\", nausea without emesis, bodyaches, 6.6 K+ at dialysis on friday        FINAL IMPRESSION      1. Diabetic ketoacidosis without coma associated with other specified diabetes mellitus ([facility]    2. Chronic kidney disease, unspecified CKD stage    3. Hyperkalemia    4. Urinary tract infection without hematuria, site unspecified    History of UTI with MRSA     Chronic cystitis  History of urinary retention   ESRD- PD     Plan:   · Check urine culture  · Start Zyvox   · On zosyn- will stop soon- pending cultures   · Check bladder scan   · Urology has seen patient- patient has refused repeat straight cath to determine bladder capacity  · Monitor labs    Imaging and labs were reviewed per medical records and any ID pertinent labs were addressed with the patient. The patient/FAMILY  was educated about the diagnosis, prognosis, indications, risks and benefits of treatment. An opportunity to ask questions was given to the patient/FAMILY and questions were answered. Thank you for involving me in the care of Yash Maldonado. Please do not hesitate to call for any questions or concerns. Electronically signed by HEBER Lynn on 6/30/2021 at 10:43 AM    As above    This is a face to face encounter with Yash Maldonado on 06/30/21. I have performed and participated in the history, exam, medical decision making, and  POC with the NURSE PRACTITIONER, HEBER Lynn. Imaging and labs were reviewed. Yash Maldonado was informed of their diagnosis, indications, risks and benefits of treatment.       [facility]

## 2021-07-01 LAB
ALBUMIN SERPL-MCNC: 3.7 G/DL (ref 3.5–5.2)
ALP BLD-CCNC: 165 U/L (ref 35–104)
ALT SERPL-CCNC: 54 U/L (ref 0–32)
ANION GAP SERPL CALCULATED.3IONS-SCNC: 20 MMOL/L (ref 7–16)
AST SERPL-CCNC: 84 U/L (ref 0–31)
BASOPHILS ABSOLUTE: 0.11 E9/L (ref 0–0.2)
BASOPHILS RELATIVE PERCENT: 1.6 % (ref 0–2)
BILIRUB SERPL-MCNC: <0.2 MG/DL (ref 0–1.2)
BUN BLDV-MCNC: 43 MG/DL (ref 6–20)
CALCIUM SERPL-MCNC: 9.1 MG/DL (ref 8.6–10.2)
CHLORIDE BLD-SCNC: 101 MMOL/L (ref 98–107)
CO2: 20 MMOL/L (ref 22–29)
CREAT SERPL-MCNC: 7.1 MG/DL (ref 0.5–1)
EOSINOPHILS ABSOLUTE: 0.39 E9/L (ref 0.05–0.5)
EOSINOPHILS RELATIVE PERCENT: 5.8 % (ref 0–6)
GFR AFRICAN AMERICAN: 8
GFR NON-AFRICAN AMERICAN: 8 ML/MIN/1.73
GLUCOSE BLD-MCNC: 343 MG/DL (ref 74–99)
HCT VFR BLD CALC: 28.9 % (ref 34–48)
HEMOGLOBIN: 8.8 G/DL (ref 11.5–15.5)
IMMATURE GRANULOCYTES #: 0.03 E9/L
IMMATURE GRANULOCYTES %: 0.4 % (ref 0–5)
LYMPHOCYTES ABSOLUTE: 2.14 E9/L (ref 1.5–4)
LYMPHOCYTES RELATIVE PERCENT: 31.9 % (ref 20–42)
MAGNESIUM: 1.9 MG/DL (ref 1.6–2.6)
MCH RBC QN AUTO: 30.7 PG (ref 26–35)
MCHC RBC AUTO-ENTMCNC: 30.4 % (ref 32–34.5)
MCV RBC AUTO: 100.7 FL (ref 80–99.9)
METER GLUCOSE: 175 MG/DL (ref 74–99)
METER GLUCOSE: 228 MG/DL (ref 74–99)
METER GLUCOSE: 323 MG/DL (ref 74–99)
METER GLUCOSE: 79 MG/DL (ref 74–99)
MONOCYTES ABSOLUTE: 0.44 E9/L (ref 0.1–0.95)
MONOCYTES RELATIVE PERCENT: 6.6 % (ref 2–12)
NEUTROPHILS ABSOLUTE: 3.6 E9/L (ref 1.8–7.3)
NEUTROPHILS RELATIVE PERCENT: 53.7 % (ref 43–80)
ORGANISM: ABNORMAL
PDW BLD-RTO: 17.3 FL (ref 11.5–15)
PHOSPHORUS: 5.4 MG/DL (ref 2.5–4.5)
PLATELET # BLD: 422 E9/L (ref 130–450)
PMV BLD AUTO: 9.7 FL (ref 7–12)
POTASSIUM SERPL-SCNC: 5 MMOL/L (ref 3.5–5)
RBC # BLD: 2.87 E12/L (ref 3.5–5.5)
SODIUM BLD-SCNC: 141 MMOL/L (ref 132–146)
TOTAL PROTEIN: 6.3 G/DL (ref 6.4–8.3)
URINE CULTURE, ROUTINE: ABNORMAL
WBC # BLD: 6.7 E9/L (ref 4.5–11.5)

## 2021-07-01 PROCEDURE — 2500000003 HC RX 250 WO HCPCS: Performed by: INTERNAL MEDICINE

## 2021-07-01 PROCEDURE — 85025 COMPLETE CBC W/AUTO DIFF WBC: CPT

## 2021-07-01 PROCEDURE — 2580000003 HC RX 258: Performed by: INTERNAL MEDICINE

## 2021-07-01 PROCEDURE — 6370000000 HC RX 637 (ALT 250 FOR IP): Performed by: INTERNAL MEDICINE

## 2021-07-01 PROCEDURE — 1200000000 HC SEMI PRIVATE

## 2021-07-01 PROCEDURE — 80053 COMPREHEN METABOLIC PANEL: CPT

## 2021-07-01 PROCEDURE — 82962 GLUCOSE BLOOD TEST: CPT

## 2021-07-01 PROCEDURE — 90945 DIALYSIS ONE EVALUATION: CPT

## 2021-07-01 PROCEDURE — 6370000000 HC RX 637 (ALT 250 FOR IP): Performed by: SPECIALIST

## 2021-07-01 PROCEDURE — 6360000002 HC RX W HCPCS: Performed by: INTERNAL MEDICINE

## 2021-07-01 PROCEDURE — 83735 ASSAY OF MAGNESIUM: CPT

## 2021-07-01 PROCEDURE — 6360000002 HC RX W HCPCS: Performed by: NURSE PRACTITIONER

## 2021-07-01 PROCEDURE — 36415 COLL VENOUS BLD VENIPUNCTURE: CPT

## 2021-07-01 PROCEDURE — 84100 ASSAY OF PHOSPHORUS: CPT

## 2021-07-01 PROCEDURE — 99232 SBSQ HOSP IP/OBS MODERATE 35: CPT | Performed by: INTERNAL MEDICINE

## 2021-07-01 RX ORDER — LINEZOLID 600 MG/1
600 TABLET, FILM COATED ORAL EVERY 12 HOURS SCHEDULED
Qty: 28 TABLET | Refills: 0 | Status: SHIPPED | OUTPATIENT
Start: 2021-07-01 | End: 2021-07-15

## 2021-07-01 RX ORDER — LOPERAMIDE HYDROCHLORIDE 2 MG/1
4 CAPSULE ORAL ONCE
Status: COMPLETED | OUTPATIENT
Start: 2021-07-01 | End: 2021-07-01

## 2021-07-01 RX ORDER — FENTANYL CITRATE 50 UG/ML
50 INJECTION, SOLUTION INTRAMUSCULAR; INTRAVENOUS ONCE
Status: DISCONTINUED | OUTPATIENT
Start: 2021-07-01 | End: 2021-07-01

## 2021-07-01 RX ORDER — FENTANYL CITRATE 50 UG/ML
50 INJECTION, SOLUTION INTRAMUSCULAR; INTRAVENOUS ONCE
Status: COMPLETED | OUTPATIENT
Start: 2021-07-01 | End: 2021-07-01

## 2021-07-01 RX ADMIN — METOCLOPRAMIDE HYDROCHLORIDE 5 MG: 5 TABLET ORAL at 21:08

## 2021-07-01 RX ADMIN — Medication 10 ML: at 08:27

## 2021-07-01 RX ADMIN — LEVOTHYROXINE SODIUM 75 MCG: 75 TABLET ORAL at 05:53

## 2021-07-01 RX ADMIN — INSULIN LISPRO 8 UNITS: 100 INJECTION, SOLUTION INTRAVENOUS; SUBCUTANEOUS at 08:38

## 2021-07-01 RX ADMIN — HYDRALAZINE HYDROCHLORIDE 10 MG: 10 TABLET, FILM COATED ORAL at 21:08

## 2021-07-01 RX ADMIN — PANTOPRAZOLE SODIUM 40 MG: 40 TABLET, DELAYED RELEASE ORAL at 05:53

## 2021-07-01 RX ADMIN — METOCLOPRAMIDE HYDROCHLORIDE 5 MG: 5 TABLET ORAL at 16:11

## 2021-07-01 RX ADMIN — OXYCODONE AND ACETAMINOPHEN 1 TABLET: 5; 325 TABLET ORAL at 03:46

## 2021-07-01 RX ADMIN — HEPARIN SODIUM 5000 UNITS: 5000 INJECTION INTRAVENOUS; SUBCUTANEOUS at 16:07

## 2021-07-01 RX ADMIN — MIRTAZAPINE 15 MG: 15 TABLET, FILM COATED ORAL at 21:08

## 2021-07-01 RX ADMIN — HYDRALAZINE HYDROCHLORIDE 10 MG: 10 TABLET, FILM COATED ORAL at 08:26

## 2021-07-01 RX ADMIN — OXYCODONE AND ACETAMINOPHEN 1 TABLET: 5; 325 TABLET ORAL at 08:38

## 2021-07-01 RX ADMIN — SEVELAMER CARBONATE 800 MG: 800 TABLET, FILM COATED ORAL at 12:28

## 2021-07-01 RX ADMIN — HEPARIN SODIUM 5000 UNITS: 5000 INJECTION INTRAVENOUS; SUBCUTANEOUS at 05:54

## 2021-07-01 RX ADMIN — HYDROXYZINE HYDROCHLORIDE 25 MG: 25 TABLET ORAL at 08:26

## 2021-07-01 RX ADMIN — INSULIN LISPRO 4 UNITS: 100 INJECTION, SOLUTION INTRAVENOUS; SUBCUTANEOUS at 12:29

## 2021-07-01 RX ADMIN — METOPROLOL TARTRATE 25 MG: 25 TABLET, FILM COATED ORAL at 08:27

## 2021-07-01 RX ADMIN — LINEZOLID 600 MG: 600 TABLET, FILM COATED ORAL at 21:08

## 2021-07-01 RX ADMIN — HEPARIN SODIUM 5000 UNITS: 5000 INJECTION INTRAVENOUS; SUBCUTANEOUS at 21:09

## 2021-07-01 RX ADMIN — INSULIN GLARGINE 7 UNITS: 100 INJECTION, SOLUTION SUBCUTANEOUS at 08:43

## 2021-07-01 RX ADMIN — LINEZOLID 600 MG: 600 TABLET, FILM COATED ORAL at 08:26

## 2021-07-01 RX ADMIN — OXYCODONE AND ACETAMINOPHEN 1 TABLET: 5; 325 TABLET ORAL at 13:33

## 2021-07-01 RX ADMIN — METOCLOPRAMIDE HYDROCHLORIDE 5 MG: 5 TABLET ORAL at 08:27

## 2021-07-01 RX ADMIN — SEVELAMER CARBONATE 800 MG: 800 TABLET, FILM COATED ORAL at 19:56

## 2021-07-01 RX ADMIN — ROPINIROLE HYDROCHLORIDE 0.25 MG: 0.25 TABLET, FILM COATED ORAL at 21:15

## 2021-07-01 RX ADMIN — INSULIN LISPRO 1 UNITS: 100 INJECTION, SOLUTION INTRAVENOUS; SUBCUTANEOUS at 21:09

## 2021-07-01 RX ADMIN — FENTANYL CITRATE 50 MCG: 50 INJECTION, SOLUTION INTRAMUSCULAR; INTRAVENOUS at 19:48

## 2021-07-01 RX ADMIN — LOPERAMIDE HYDROCHLORIDE 4 MG: 2 CAPSULE ORAL at 19:56

## 2021-07-01 RX ADMIN — SEVELAMER CARBONATE 800 MG: 800 TABLET, FILM COATED ORAL at 08:26

## 2021-07-01 RX ADMIN — ARIPIPRAZOLE 5 MG: 5 TABLET ORAL at 23:32

## 2021-07-01 RX ADMIN — FOLIC ACID 1 MG: 1 TABLET ORAL at 08:26

## 2021-07-01 RX ADMIN — SODIUM CHLORIDE: 9 INJECTION, SOLUTION INTRAVENOUS at 02:11

## 2021-07-01 ASSESSMENT — PAIN DESCRIPTION - PROGRESSION: CLINICAL_PROGRESSION: NOT CHANGED

## 2021-07-01 ASSESSMENT — PAIN SCALES - GENERAL
PAINLEVEL_OUTOF10: 6
PAINLEVEL_OUTOF10: 8
PAINLEVEL_OUTOF10: 5
PAINLEVEL_OUTOF10: 8

## 2021-07-01 ASSESSMENT — PAIN DESCRIPTION - LOCATION: LOCATION: ABDOMEN

## 2021-07-01 ASSESSMENT — PAIN DESCRIPTION - PAIN TYPE: TYPE: CHRONIC PAIN

## 2021-07-01 ASSESSMENT — PAIN DESCRIPTION - FREQUENCY: FREQUENCY: CONTINUOUS

## 2021-07-01 ASSESSMENT — PAIN DESCRIPTION - ONSET: ONSET: ON-GOING

## 2021-07-01 ASSESSMENT — PAIN DESCRIPTION - DESCRIPTORS: DESCRIPTORS: ACHING;DISCOMFORT;SPASM;TENDER

## 2021-07-01 ASSESSMENT — PAIN - FUNCTIONAL ASSESSMENT: PAIN_FUNCTIONAL_ASSESSMENT: PREVENTS OR INTERFERES SOME ACTIVE ACTIVITIES AND ADLS

## 2021-07-01 NOTE — PROGRESS NOTES
CCPD completed as ordered.  ml effluent drain clear pale yellow No fibrin noted. pt disconnected and stay safe cap applied using aseptic technique.

## 2021-07-01 NOTE — PROGRESS NOTES
[address] [place] [address] Hospitalist   Progress Note    Admitting Date and Time: 6/21/2021 10:37 PM  Admit Dx: DKA, type 1, not at goal [facility]) [E10.10]  Hyperosmolality [E87.0]    Subjective/interval history:    6/27: States her lower abdominal and pelvic pain is worse today. Requiring regular oxycodone and fentanyl. Blood glucose has been reasonably controlled. 6/28: Patient still having lower abdominal/pelvic pain. No improvement from yesterday. She is asking if she can have a shower today. 6/29: Patient still having a lot of cramping in bladder and only gets about 30 minutes of relief after dose of Fentanyl. 6/30: Patient feeling better after shower. She was sitting up in chair. 7/1: Patient states having a lot of cramping today in her lower abdomen. She is wondering if she get a one-time dose of pain medication. She is also asking for Paremyd which she takes at home to control her frequent loose stools    Per RN: patient no longer has IV access.      fentanNYL  50 mcg Intramuscular Once    linezolid  600 mg Oral 2 times per day    epoetin nena-epbx  3,000 Units Subcutaneous Once per day on Mon Wed Fri    famotidine (PEPCID) injection  10 mg Intravenous Daily    sodium chloride flush  10 mL Intravenous 2 times per day    sodium chloride  15 mL/kg Intravenous Once    heparin (porcine)  5,000 Units Subcutaneous 3 times per day    metoclopramide  5 mg Oral TID    rOPINIRole  0.25 mg Oral Nightly    mirtazapine  15 mg Oral Nightly    hydrOXYzine  25 mg Oral Daily    ARIPiprazole  5 mg Oral Nightly    sevelamer  800 mg Oral TID WC    hydrALAZINE  10 mg Oral BID    metoprolol tartrate  25 mg Oral Daily    pantoprazole  40 mg Oral QAM AC    levothyroxine  75 mcg Oral Daily    folic acid  1 mg Oral Daily    insulin glargine  7 Units Subcutaneous Daily    insulin lispro  0-12 Units Subcutaneous TID WC    insulin lispro  0-6 Units Subcutaneous Nightly    gentamicin   Topical Daily  sodium chloride flush  5-40 mL Intravenous BID     sodium chloride, 250 mL, PRN  glucose, 15 g, PRN  dextrose, 12.5 g, PRN  glucagon (rDNA), 1 mg, PRN  dextrose, 100 mL/hr, PRN  sodium chloride flush, 10 mL, PRN  sodium chloride, 25 mL, PRN  promethazine, 12.5 mg, Q6H PRN   Or  ondansetron, 4 mg, Q6H PRN  acetaminophen, 650 mg, Q6H PRN   Or  acetaminophen, 650 mg, Q6H PRN  polyethylene glycol, 17 g, Daily PRN  albuterol, 2.5 mg, Q6H PRN  oxyCODONE-acetaminophen, 1 tablet, Q4H PRN  heparin flush, 100 Units, PRN         Objective:    BP (!) 158/90   Pulse 112   Temp 98.2 °F (36.8 °C) (Oral)   Resp 20   Ht 5' 4\" (1.626 m)   Wt 138 lb 0.1 oz (62.6 kg)   LMP 02/21/2021   SpO2 98%   BMI 23.69 kg/m²   General Appearance: Alert and oriented x3. Patient had sheets over head today. Skin: warm and dry  Head: normocephalic and atraumatic  Eyes: pupils equal, round, and reactive to light, extraocular eye movements intact, conjunctivae normal  Neck: neck supple and non tender without mass   Pulmonary/Chest: Nonlabored on room air. Clear to auscultation bilaterally. Cardiovascular: normal rate, normal S1 and S2 and no carotid bruits  Abdomen: Soft, lower abdominal tenderness without peritoneal signs, nondistended, normal bowel sounds. Peritoneal dialysis catheter in place. No palpable organomegaly.   No costovertebral angle tenderness Extremities: no cyanosis, no clubbing and no edema, left leg scar from prior ulcerating wound but no open wound  Neurologic: no cranial nerve deficit and speech normal      Recent Labs     06/29/21  0600 06/30/21  0500 07/01/21  1024    140 141   K 4.4 4.5 5.0    100 101   CO2 25 27 20*   BUN 52* 45* 43*   CREATININE 7.8* 7.2* 7.1*   GLUCOSE 284* 222* 343*   CALCIUM 8.9 9.0 9.1       Recent Labs     06/29/21  0600 06/30/21  0500 07/01/21  1024   ALKPHOS 120* 116* 165*   PROT 5.6* 5.4* 6.3*   LABALBU 3.6 3.5 3.7   BILITOT <0.2 <0.2 <0.2   AST 30 23 84*   ALT 27 25 54* Recent Labs     06/29/21  0600 06/30/21  0500 07/01/21  1024   WBC 6.4 5.8 6.7   RBC 2.55* 2.33* 2.87*   HGB 7.7* 7.3* 8.8*   HCT 25.5* 23.0* 28.9*   .0* 98.7 100.7*   MCH 30.2 31.3 30.7   MCHC 30.2* 31.7* 30.4*   RDW 16.8* 17.0* 17.3*    321 422   MPV 9.6 9.6 9.7       Culture, Urine [[id number]] (Abnormal)  Collected: 06/29/21 1130     Specimen: Urine voided Updated: 07/01/21 0951      Organism Enterococcus faecium      Urine Culture, Routine --      >100,000 CFU/ml   Vancomycin resistant Enterococci isolated      Narrative:       Source: URV       Site:      Enterococcus faecium (1)    Antibiotic Interpretation DEENA Status    ampicillin Resistant >=^32 mcg/mL     doxycycline Resistant >=^16 mcg/mL     gentamicin-syn Sensitive SYN-S mcg/mL     linezolid Sensitive ^2 mcg/mL     nitrofurantoin Intermediate ^64 mcg/mL     vancomycin Resistant >=^32 mcg/mL         Radiology:   CT ABDOMEN PELVIS WO CONTRAST Additional Contrast? Oral   Final Result   Circumferential wall thickening of the urinary bladder, consistent with the   patient's known diagnosis of acute cystitis. Otherwise, no evidence of an   inflammatory or obstructive process in the abdomen or pelvis to explain pain. Moderate volume of free fluid throughout the abdomen/pelvis, likely related   to dialysate, given the peritoneal dialysis catheter in the deep pelvis. Nonspecific patchy bilateral ground-glass opacities at the lung bases. These   are nonspecific and may be on the basis of an infectious or inflammatory   pneumonitis. XR CHEST PORTABLE   Final Result   No acute process.              Assessment/Plan:  Principal Problem:    DKA, type 1, not at goal [facility])  Active Problems:    Uncontrolled type 1 diabetes mellitus with hyperglycemia, with long-term current use of insulin ([facility] [address]    ESRD on peritoneal dialysis ([facility] [address]    Acute cystitis    Hypertension    Hyperosmolality    Major depressive disorder  Resolved Problems: following for PD management    5. Uncontrolled type I DM  Off insulin drip; continue subcutaneous insulin regimen    6. Essential hypertension   -Continue home hydralazine and metoprolol tartrate. Pressure variable but reasonably controlled    7. Depression  -Continue home Abilify and Remeron    8. Hypothyroidism  -continue home levothyroxine    9. Disposition  -patient does not feel well enough for discharge today but is hopeful she will be better tomorrow. DVT prophylaxis: Subcutaneous heparin  CODE STATUS: Full code     Case discussed with Dr. Vishnu Montgomery of ID over the phone. NOTE: This report was transcribed using voice recognition software. Every effort was made to ensure accuracy; however, inadvertent computerized transcription errors may be present.      Electronically signed by Hilda Graf MD on 7/1/2021 at 3:21 PM

## 2021-07-01 NOTE — PROGRESS NOTES
Physical Therapy        0332/0332-01    Patient unavailable for physical therapy treatment due to patient sitting on bedside commode as she has been having diarrhea and has a stomach ache. Will attempt session at later time/date.      Kelsi Rodriguez, PTA  #[id number]

## 2021-07-01 NOTE — PLAN OF CARE
Problem: Skin Integrity:  Goal: Will show no infection signs and symptoms  Description: Will show no infection signs and symptoms  7/1/2021 0418 by Kye Moser RN  Outcome: Met This Shift  6/30/2021 1604 by Kate Eldridge RN  Outcome: Met This Shift  Goal: Absence of new skin breakdown  Description: Absence of new skin breakdown  7/1/2021 0418 by Kye Moser RN  Outcome: Met This Shift  6/30/2021 1604 by Kate Eldridge RN  Outcome: Met This Shift     Problem: Falls - Risk of:  Goal: Will remain free from falls  Description: Will remain free from falls  7/1/2021 0418 by Kye Moser RN  Outcome: Met This Shift  6/30/2021 1604 by Kate Eldridge RN  Outcome: Met This Shift  Goal: Absence of physical injury  Description: Absence of physical injury  7/1/2021 0418 by Kye Moser RN  Outcome: Met This Shift  6/30/2021 1604 by Kate Eldridge RN  Outcome: Met This Shift

## 2021-07-01 NOTE — PROGRESS NOTES
[address] [facility]  [facility]  Progress Note    NAME: Nitza Denis  MR:  [id number]  :   1990  DATE OF SERVICE:21    This is a face to face encounter with Wilton Flores 36 y.o. female on 21    CHIEF COMPLAINT     ID following for   Chief Complaint   Patient presents with    Hyperglycemia     bgl reading \"high\", nausea without emesis, bodyaches, 6.6 K+ at dialysis on friday     HISTORY OF PRESENT ILLNESS   In bed afebrile abd pain less  Patient is tolerating medications. No reported adverse drug reactions. REVIEW OF SYSTEMS     As stated above in the chief complaint, otherwise negative.   CURRENT MEDICATIONS      fentanNYL  50 mcg Intramuscular Once    fentanNYL  50 mcg Intramuscular Once    linezolid  600 mg Oral 2 times per day    epoetin nena-epbx  3,000 Units Subcutaneous Once per day on     famotidine (PEPCID) injection  10 mg Intravenous Daily    sodium chloride flush  10 mL Intravenous 2 times per day    sodium chloride  15 mL/kg Intravenous Once    heparin (porcine)  5,000 Units Subcutaneous 3 times per day    metoclopramide  5 mg Oral TID    rOPINIRole  0.25 mg Oral Nightly    mirtazapine  15 mg Oral Nightly    hydrOXYzine  25 mg Oral Daily    ARIPiprazole  5 mg Oral Nightly    sevelamer  800 mg Oral TID WC    hydrALAZINE  10 mg Oral BID    metoprolol tartrate  25 mg Oral Daily    pantoprazole  40 mg Oral QAM AC    levothyroxine  75 mcg Oral Daily    folic acid  1 mg Oral Daily    insulin glargine  7 Units Subcutaneous Daily    insulin lispro  0-12 Units Subcutaneous TID WC    insulin lispro  0-6 Units Subcutaneous Nightly    gentamicin   Topical Daily    sodium chloride flush  5-40 mL Intravenous BID     Continuous Infusions:   sodium chloride      dextrose      insulin Stopped (21 1235)    sodium chloride 25 mL (21 1633)    sodium chloride Stopped (21 0804)    dextrose 5 % and 0.9 % NaCl Stopped (21 1235)     PRN Meds:sodium chloride, glucose, dextrose, glucagon (rDNA), dextrose, sodium chloride flush, sodium chloride, promethazine **OR** ondansetron, acetaminophen **OR** acetaminophen, polyethylene glycol, albuterol, oxyCODONE-acetaminophen, heparin flush    PHYSICAL EXAM     BP (!) 158/90   Pulse 112   Temp 98.2 °F (36.8 °C) (Oral)   Resp 20   Ht 5' 4\" (1.626 m)   Wt 138 lb 0.1 oz (62.6 kg)   LMP 2021   SpO2 98%   BMI 23.69 kg/m²   Temp  Av.3 °F (36.8 °C)  Min: 98.2 °F (36.8 °C)  Max: 98.4 °F (36.9 °C)  Constitutional:  The patient is awake, alert, and oriented. Skin:    Warm and dry. No rashes were noted. HEENT:   Round and reactive pupils. AT/NC   Chest:   No use of accessory muscles to breathe. Symmetrical expansion. Cardiovascular:  S1 and S2 are rhythmic and regular. No murmurs appreciated. Abdomen:   Positive bowel sounds to auscultation. Benign to palpation. pd  Extremities:   No clubbing, no cyanosis, no edema.  Left le healed   CNS    AAxO   Lines: piv      DIAGNOSTIC RESULTS   Radiology:    Recent Labs     21  0600 21  0500 21  1024   WBC 6.4 5.8 6.7   RBC 2.55* 2.33* 2.87*   HGB 7.7* 7.3* 8.8*   HCT 25.5* 23.0* 28.9*   .0* 98.7 100.7*   MCH 30.2 31.3 30.7   MCHC 30.2* 31.7* 30.4*   RDW 16.8* 17.0* 17.3*    321 422   MPV 9.6 9.6 9.7     Recent Labs     21  0600 21  0500 21  1024    140 141   K 4.4 4.5 5.0    100 101   CO2 25 27 20*   BUN 52* 45* 43*   CREATININE 7.8* 7.2* 7.1*   GLUCOSE 284* 222* 343*   PROT 5.6* 5.4* 6.3*   LABALBU 3.6 3.5 3.7   CALCIUM 8.9 9.0 9.1   BILITOT <0.2 <0.2 <0.2   ALKPHOS 120* 116* 165*   AST 30 23 84*   ALT 27 25 54*     Lab Results   Component Value Date    CRP 12.8 (H) 2021    CRP 2.5 (H) 10/31/2020    CRP 43.1 (H) 2019     Lab Results   Component Value Date    SEDRATE 95 (H) 2021    SEDRATE 35 (H) 10/31/2020    SEDRATE 19 10/13/2020     Recent identify be conventional methods      LABFUNG No growth after 4 weeks of incubation. 11/06/2020 1142       MRSA Culture Only   Date Value Ref Range Status   12/27/2020 Methicillin resistant Staph aureus not isolated  Final     CULTURE, RESPIRATORY   Date Value Ref Range Status   02/18/2021 Oral Pharyngeal Celine reduced (A)  Final   02/18/2021 Rare growth  Final   02/18/2021 Moderate growth  Final     Recent Labs     06/29/21  1130   ORG Enterococcus faecium*           FINAL IMPRESSION    vre uti   Urinary retention refused cath     Cont linezolid (ZYVOX) tablet 600 mg, 2 times per day  Med rec           · Monitor labs    Imaging and labs were reviewed per medical records. The patient was educated about the diagnosis, prognosis, indications, risks and benefits of treatment. An opportunity to ask questions was given to the patient/FAMILY. Thank you for involving me in the care of [address] [place] [address] I will continue to follow. Please do not hesitate to call for any questions or concerns.     Electronically signed by Cadence Moya MD on 7/1/2021 at 3:37 PM

## 2021-07-02 VITALS
WEIGHT: 138.01 LBS | HEART RATE: 90 BPM | OXYGEN SATURATION: 95 % | BODY MASS INDEX: 23.56 KG/M2 | RESPIRATION RATE: 16 BRPM | SYSTOLIC BLOOD PRESSURE: 136 MMHG | HEIGHT: 64 IN | DIASTOLIC BLOOD PRESSURE: 82 MMHG | TEMPERATURE: 97.9 F

## 2021-07-02 LAB
ALBUMIN SERPL-MCNC: 3.3 G/DL (ref 3.5–5.2)
ALP BLD-CCNC: 142 U/L (ref 35–104)
ALT SERPL-CCNC: 50 U/L (ref 0–32)
ANION GAP SERPL CALCULATED.3IONS-SCNC: 18 MMOL/L (ref 7–16)
AST SERPL-CCNC: 53 U/L (ref 0–31)
BASOPHILS ABSOLUTE: 0 E9/L (ref 0–0.2)
BASOPHILS RELATIVE PERCENT: 0 % (ref 0–2)
BILIRUB SERPL-MCNC: <0.2 MG/DL (ref 0–1.2)
BUN BLDV-MCNC: 44 MG/DL (ref 6–20)
CALCIUM SERPL-MCNC: 9.4 MG/DL (ref 8.6–10.2)
CHLORIDE BLD-SCNC: 103 MMOL/L (ref 98–107)
CO2: 21 MMOL/L (ref 22–29)
CREAT SERPL-MCNC: 7.3 MG/DL (ref 0.5–1)
EOSINOPHILS ABSOLUTE: 0.39 E9/L (ref 0.05–0.5)
EOSINOPHILS RELATIVE PERCENT: 7 % (ref 0–6)
GFR AFRICAN AMERICAN: 8
GFR NON-AFRICAN AMERICAN: 8 ML/MIN/1.73
GLUCOSE BLD-MCNC: 121 MG/DL (ref 74–99)
HCT VFR BLD CALC: 25.9 % (ref 34–48)
HEMOGLOBIN: 8.5 G/DL (ref 11.5–15.5)
LYMPHOCYTES ABSOLUTE: 2.3 E9/L (ref 1.5–4)
LYMPHOCYTES RELATIVE PERCENT: 41 % (ref 20–42)
MAGNESIUM: 1.9 MG/DL (ref 1.6–2.6)
MCH RBC QN AUTO: 31.3 PG (ref 26–35)
MCHC RBC AUTO-ENTMCNC: 32.8 % (ref 32–34.5)
MCV RBC AUTO: 95.2 FL (ref 80–99.9)
METER GLUCOSE: 131 MG/DL (ref 74–99)
METER GLUCOSE: 325 MG/DL (ref 74–99)
MONOCYTES ABSOLUTE: 0.5 E9/L (ref 0.1–0.95)
MONOCYTES RELATIVE PERCENT: 9 % (ref 2–12)
NEUTROPHILS ABSOLUTE: 2.41 E9/L (ref 1.8–7.3)
NEUTROPHILS RELATIVE PERCENT: 43 % (ref 43–80)
OVALOCYTES: ABNORMAL
PDW BLD-RTO: 17.1 FL (ref 11.5–15)
PHOSPHORUS: 6.2 MG/DL (ref 2.5–4.5)
PLATELET # BLD: 256 E9/L (ref 130–450)
PMV BLD AUTO: 10 FL (ref 7–12)
POIKILOCYTES: ABNORMAL
POTASSIUM SERPL-SCNC: 5.5 MMOL/L (ref 3.5–5)
RBC # BLD: 2.72 E12/L (ref 3.5–5.5)
SODIUM BLD-SCNC: 142 MMOL/L (ref 132–146)
TEAR DROP CELLS: ABNORMAL
TOTAL PROTEIN: 5.8 G/DL (ref 6.4–8.3)
WBC # BLD: 5.6 E9/L (ref 4.5–11.5)

## 2021-07-02 PROCEDURE — 6370000000 HC RX 637 (ALT 250 FOR IP): Performed by: CLINICAL NURSE SPECIALIST

## 2021-07-02 PROCEDURE — 82962 GLUCOSE BLOOD TEST: CPT

## 2021-07-02 PROCEDURE — 6360000002 HC RX W HCPCS: Performed by: INTERNAL MEDICINE

## 2021-07-02 PROCEDURE — 85025 COMPLETE CBC W/AUTO DIFF WBC: CPT

## 2021-07-02 PROCEDURE — 6370000000 HC RX 637 (ALT 250 FOR IP): Performed by: INTERNAL MEDICINE

## 2021-07-02 PROCEDURE — 99239 HOSP IP/OBS DSCHRG MGMT >30: CPT | Performed by: INTERNAL MEDICINE

## 2021-07-02 PROCEDURE — 6370000000 HC RX 637 (ALT 250 FOR IP): Performed by: SPECIALIST

## 2021-07-02 PROCEDURE — 6360000002 HC RX W HCPCS: Performed by: NURSE PRACTITIONER

## 2021-07-02 PROCEDURE — 80053 COMPREHEN METABOLIC PANEL: CPT

## 2021-07-02 PROCEDURE — 83735 ASSAY OF MAGNESIUM: CPT

## 2021-07-02 PROCEDURE — 84100 ASSAY OF PHOSPHORUS: CPT

## 2021-07-02 PROCEDURE — 36415 COLL VENOUS BLD VENIPUNCTURE: CPT

## 2021-07-02 RX ORDER — OXYCODONE HYDROCHLORIDE AND ACETAMINOPHEN 5; 325 MG/1; MG/1
1 TABLET ORAL EVERY 6 HOURS PRN
Qty: 12 TABLET | Refills: 0 | Status: SHIPPED | OUTPATIENT
Start: 2021-07-02 | End: 2021-07-05

## 2021-07-02 RX ORDER — SEVELAMER CARBONATE 800 MG/1
800 TABLET, FILM COATED ORAL
Qty: 90 TABLET | Refills: 3 | Status: ON HOLD
Start: 2021-07-02 | End: 2021-12-12 | Stop reason: HOSPADM

## 2021-07-02 RX ORDER — LOPERAMIDE HYDROCHLORIDE 2 MG/1
4 CAPSULE ORAL PRN
Status: DISCONTINUED | OUTPATIENT
Start: 2021-07-02 | End: 2021-07-02 | Stop reason: HOSPADM

## 2021-07-02 RX ORDER — FLUCONAZOLE 100 MG/1
100 TABLET ORAL DAILY
Qty: 5 TABLET | Refills: 0 | Status: SHIPPED
Start: 2021-07-02 | End: 2021-07-02 | Stop reason: HOSPADM

## 2021-07-02 RX ORDER — FENTANYL CITRATE 50 UG/ML
50 INJECTION, SOLUTION INTRAMUSCULAR; INTRAVENOUS ONCE
Status: COMPLETED | OUTPATIENT
Start: 2021-07-02 | End: 2021-07-02

## 2021-07-02 RX ORDER — FLUCONAZOLE 100 MG/1
200 TABLET ORAL ONCE
Status: COMPLETED | OUTPATIENT
Start: 2021-07-02 | End: 2021-07-02

## 2021-07-02 RX ORDER — INSULIN GLARGINE 100 [IU]/ML
7 INJECTION, SOLUTION SUBCUTANEOUS DAILY
Qty: 1 VIAL | Refills: 3 | Status: ON HOLD
Start: 2021-07-03 | End: 2021-09-15 | Stop reason: HOSPADM

## 2021-07-02 RX ADMIN — FENTANYL CITRATE 50 MCG: 50 INJECTION, SOLUTION INTRAMUSCULAR; INTRAVENOUS at 12:28

## 2021-07-02 RX ADMIN — FLUCONAZOLE 200 MG: 100 TABLET ORAL at 10:05

## 2021-07-02 RX ADMIN — LINEZOLID 600 MG: 600 TABLET, FILM COATED ORAL at 10:06

## 2021-07-02 RX ADMIN — PANTOPRAZOLE SODIUM 40 MG: 40 TABLET, DELAYED RELEASE ORAL at 06:21

## 2021-07-02 RX ADMIN — INSULIN LISPRO 8 UNITS: 100 INJECTION, SOLUTION INTRAVENOUS; SUBCUTANEOUS at 12:14

## 2021-07-02 RX ADMIN — HEPARIN SODIUM 5000 UNITS: 5000 INJECTION INTRAVENOUS; SUBCUTANEOUS at 06:21

## 2021-07-02 RX ADMIN — METOCLOPRAMIDE HYDROCHLORIDE 5 MG: 5 TABLET ORAL at 10:06

## 2021-07-02 RX ADMIN — SEVELAMER CARBONATE 800 MG: 800 TABLET, FILM COATED ORAL at 10:05

## 2021-07-02 RX ADMIN — LOPERAMIDE HYDROCHLORIDE 4 MG: 2 CAPSULE ORAL at 12:29

## 2021-07-02 RX ADMIN — METOPROLOL TARTRATE 25 MG: 25 TABLET, FILM COATED ORAL at 10:06

## 2021-07-02 RX ADMIN — HYDROXYZINE HYDROCHLORIDE 25 MG: 25 TABLET ORAL at 10:07

## 2021-07-02 RX ADMIN — SEVELAMER CARBONATE 800 MG: 800 TABLET, FILM COATED ORAL at 12:38

## 2021-07-02 RX ADMIN — FOLIC ACID 1 MG: 1 TABLET ORAL at 10:05

## 2021-07-02 RX ADMIN — GENTAMICIN SULFATE: 1 CREAM TOPICAL at 10:11

## 2021-07-02 RX ADMIN — LOPERAMIDE HYDROCHLORIDE 4 MG: 2 CAPSULE ORAL at 02:46

## 2021-07-02 RX ADMIN — INSULIN GLARGINE 7 UNITS: 100 INJECTION, SOLUTION SUBCUTANEOUS at 10:12

## 2021-07-02 RX ADMIN — LEVOTHYROXINE SODIUM 75 MCG: 75 TABLET ORAL at 06:21

## 2021-07-02 RX ADMIN — OXYCODONE AND ACETAMINOPHEN 1 TABLET: 5; 325 TABLET ORAL at 02:30

## 2021-07-02 RX ADMIN — OXYCODONE AND ACETAMINOPHEN 1 TABLET: 5; 325 TABLET ORAL at 10:04

## 2021-07-02 RX ADMIN — EPOETIN ALFA-EPBX 3000 UNITS: 3000 INJECTION, SOLUTION INTRAVENOUS; SUBCUTANEOUS at 10:11

## 2021-07-02 RX ADMIN — HYDRALAZINE HYDROCHLORIDE 10 MG: 10 TABLET, FILM COATED ORAL at 10:06

## 2021-07-02 ASSESSMENT — PAIN DESCRIPTION - DESCRIPTORS: DESCRIPTORS: ACHING;DISCOMFORT;SORE

## 2021-07-02 ASSESSMENT — PAIN DESCRIPTION - PAIN TYPE: TYPE: ACUTE PAIN

## 2021-07-02 ASSESSMENT — PAIN DESCRIPTION - FREQUENCY: FREQUENCY: INTERMITTENT

## 2021-07-02 ASSESSMENT — PAIN SCALES - GENERAL
PAINLEVEL_OUTOF10: 7
PAINLEVEL_OUTOF10: 8
PAINLEVEL_OUTOF10: 6
PAINLEVEL_OUTOF10: 5

## 2021-07-02 ASSESSMENT — PAIN DESCRIPTION - ONSET: ONSET: GRADUAL

## 2021-07-02 ASSESSMENT — PAIN DESCRIPTION - PROGRESSION: CLINICAL_PROGRESSION: NOT CHANGED

## 2021-07-02 ASSESSMENT — PAIN DESCRIPTION - LOCATION: LOCATION: GENERALIZED

## 2021-07-02 ASSESSMENT — PAIN - FUNCTIONAL ASSESSMENT: PAIN_FUNCTIONAL_ASSESSMENT: PREVENTS OR INTERFERES SOME ACTIVE ACTIVITIES AND ADLS

## 2021-07-02 ASSESSMENT — PAIN DESCRIPTION - ORIENTATION: ORIENTATION: OTHER (COMMENT)

## 2021-07-02 NOTE — PROGRESS NOTES
[address] [facility]  [facility]  Progress Note    NAME: Paul Cruz  MR:  [id number]  :   1990  DATE OF SERVICE:21    This is a face to face encounter with Paul Cruz 36 y.o. female on 21    CHIEF COMPLAINT     ID following for   Chief Complaint   Patient presents with    Hyperglycemia     bgl reading \"high\", nausea without emesis, bodyaches, 6.6 K+ at dialysis on [address]   Patient is in bed this am- she is still with dysuria- reports her abdominal pain is a little better this morning. Patient is tolerating medications. No reported adverse drug reactions. REVIEW OF SYSTEMS     As stated above in the chief complaint, otherwise negative.   CURRENT MEDICATIONS      fluconazole  200 mg Oral Once    linezolid  600 mg Oral 2 times per day    epoetin nena-epbx  3,000 Units Subcutaneous Once per day on     famotidine (PEPCID) injection  10 mg Intravenous Daily    sodium chloride flush  10 mL Intravenous 2 times per day    sodium chloride  15 mL/kg Intravenous Once    heparin (porcine)  5,000 Units Subcutaneous 3 times per day    metoclopramide  5 mg Oral TID    rOPINIRole  0.25 mg Oral Nightly    mirtazapine  15 mg Oral Nightly    hydrOXYzine  25 mg Oral Daily    ARIPiprazole  5 mg Oral Nightly    sevelamer  800 mg Oral TID WC    hydrALAZINE  10 mg Oral BID    metoprolol tartrate  25 mg Oral Daily    pantoprazole  40 mg Oral QAM AC    levothyroxine  75 mcg Oral Daily    folic acid  1 mg Oral Daily    insulin glargine  7 Units Subcutaneous Daily    insulin lispro  0-12 Units Subcutaneous TID WC    insulin lispro  0-6 Units Subcutaneous Nightly    gentamicin   Topical Daily    sodium chloride flush  5-40 mL Intravenous BID     Continuous Infusions:   sodium chloride      dextrose      insulin Stopped (21 1235)    sodium chloride 25 mL (21 1633)    sodium chloride Stopped (21 Component Value Date    CHOL 95 01/14/2020    TRIG 82 01/14/2020    HDL 33 01/14/2020    LDLCALC 46 01/14/2020    LABVLDL 16 01/14/2020     Lab Results   Component Value Date/Time    VITD25 7 (L) 02/21/2021 05:27 AM       Microbiology:   No results for input(s): COVID19 in the last 72 hours. Lab Results   Component Value Date    BC 5 Days no growth 06/21/2021    BC 5 Days no growth 05/14/2021    BC 5 Days no growth 02/18/2021    BLOODCULT2 5 Days no growth 06/21/2021    BLOODCULT2 5 Days no growth 05/14/2021    BLOODCULT2 5 Days no growth 02/18/2021    ORG Enterococcus faecium 06/29/2021    ORG Yeast 05/18/2021    ORG Staphylococcus aureus 05/18/2021    ORG Nadine albicans 05/18/2021     WOUND/ABSCESS   Date Value Ref Range Status   05/16/2021   Final    Heavy growth  Refer to previous culture (CXWND: Leg-Left collected on 5-14-21 at 1426)  for susceptibility results     05/14/2021   Final    Heavy growth  Methicillin resistant Staph aureus isolated. Most Methicillin  resistant Staphylococcus are usually resistant to multiple  antibiotics including other B-Lactams, Aminoglycosides,  Macrolides, Clindamycin and Tetracycline. Contact isolation  is indicated. 02/22/2021 Growth not present  Final     Smear, Respiratory   Date Value Ref Range Status   02/18/2021   Final    Group 6: <25 PMN's/LPF and <25 Epithelial cells/LPF  Rare Polymorphonuclear leukocytes  Rare Epithelial cells  Few Gram positive diplococci  Rare Gram negative rods           Component Value Date/Time    AFBCX  05/18/2021 0727     No growth after 1 week/s of incubation. No growth after 2 week/s of incubation. No growth after 3 week/s of incubation. No growth after 4 week/s of incubation. No growth after 5 week/s of incubation. FUNGSM 1+ Yeast 05/18/2021 0727    LABFUNG  05/18/2021 0727     Moderate growth  No further workup  Unable to identify be conventional methods      LABFUNG No growth after 4 weeks of incubation.  11/06/2020 1142 MRSA Culture Only   Date Value Ref Range Status   12/27/2020 Methicillin resistant Staph aureus not isolated  Final     CULTURE, RESPIRATORY   Date Value Ref Range Status   02/18/2021 Oral Pharyngeal Celine reduced (A)  Final   02/18/2021 Rare growth  Final   02/18/2021 Moderate growth  Final     Recent Labs     06/29/21  1130   ORG Enterococcus faecium*           FINAL IMPRESSION    vre uti   Urinary retention refused cath  Dysuria      Plan:   · Cont linezolid (ZYVOX) tablet 600 mg, 2 times per day  · Diflucan x1 today   · Med rec is done  · Patient refused arceo/ straight cath  · Monitor labs- reviewed  · Patient can d/c from ID POV    Imaging and labs were reviewed per medical records. The patient was educated about the diagnosis, prognosis, indications, risks and benefits of treatment. An opportunity to ask questions was given to the patient/FAMILY. Thank you for involving me in the care of [address] [place] [address] I will continue to follow. Please do not hesitate to call for any questions or concerns. Electronically signed by HEBER Kerr on 7/2/2021 at 7:50 AM     As above    This is a face to face encounter with [address] [place] [address] on 07/02/21. I have performed and participated in the history, exam, medical decision making, and  POC with the NURSE PRACTITIONER, HEBER Kerr. Imaging and labs were reviewed. [address] [place] [address] was informed of their diagnosis, indications, risks and benefits of treatment. [address] [place] [address] had the opportunity to ask questions. All questions were answered. Pt in bed   Looks sollen   Has no c/o f/c/n/v/d  Cont rx VRE uti   F/u 2 weeks    Thank you for involving me in the care of [address] [place] [address]. Please do not hesitate to call for any questions or concerns.     Electronically signed by Shawna Hager MD on 7/2/2021 at 12:14 PM    Phone [phone]  Fax [phone]

## 2021-07-02 NOTE — PLAN OF CARE
Problem: Skin Integrity:  Goal: Will show no infection signs and symptoms  Description: Will show no infection signs and symptoms  7/2/2021 1345 by Adrian Pantoja RN  Outcome: Met This Shift     Problem: Skin Integrity:  Goal: Absence of new skin breakdown  Description: Absence of new skin breakdown  7/2/2021 1345 by Adrian Pantoja RN  Outcome: Met This Shift     Problem: Falls - Risk of:  Goal: Will remain free from falls  Description: Will remain free from falls  7/2/2021 1345 by Adrian Pantoja RN  Outcome: Met This Shift     Problem: Falls - Risk of:  Goal: Absence of physical injury  Description: Absence of physical injury  7/2/2021 1345 by Adrian Pantoja RN  Outcome: Met This Shift     Problem: Pain:  Goal: Pain level will decrease  Description: Pain level will decrease  7/2/2021 1345 by Adrian Pantoja RN  Outcome: Met This Shift     Problem: Pain:  Goal: Control of acute pain  Description: Control of acute pain  7/2/2021 1345 by Adrian Pantoja RN  Outcome: Met This Shift     Problem: Pain:  Goal: Control of chronic pain  Description: Control of chronic pain  7/2/2021 1345 by Adrian Pantoja RN  Outcome: Met This Shift     Problem:  Activity:  Goal: Fatigue will decrease  Description: Fatigue will decrease  7/2/2021 1345 by Adrian Pantoja RN  Outcome: Met This Shift     Problem: Fluid Volume:  Goal: Will show no signs or symptoms of fluid imbalance  Description: Will show no signs or symptoms of fluid imbalance  7/2/2021 1345 by Adrian Pantoja RN  Outcome: Met This Shift     Problem: Physical Regulation:  Goal: Ability to maintain clinical measurements within normal limits will improve  Description: Ability to maintain clinical measurements within normal limits will improve  7/2/2021 1345 by Adrian Pantoja RN  Outcome: Met This Shift     Problem: Physical Regulation:  Goal: Complications related to the disease process, condition or treatment will be avoided or minimized  Description: Complications related to the disease process, condition or treatment will be avoided or minimized  7/2/2021 1345 by Deepa Akers RN  Outcome: Met This Shift     Problem: Physical Regulation:  Goal: Signs of adequate cerebral perfusion will increase  Description: Signs of adequate cerebral perfusion will increase  7/2/2021 1345 by Deepa Akers RN  Outcome: Met This Shift     Problem: Physical Regulation:  Goal: Ability to maintain a stable neurologic state will improve  7/2/2021 1345 by Deepa Akers RN  Outcome: Met This Shift     Problem: Safety:  Goal: Ability to remain free from injury will improve  Description: Ability to remain free from injury will improve  Outcome: Met This Shift

## 2021-07-02 NOTE — PLAN OF CARE
Problem: Skin Integrity:  Goal: Absence of new skin breakdown  Outcome: Met This Shift     Problem: Falls - Risk of:  Goal: Will remain free from falls  Outcome: Met This Shift     Problem: Falls - Risk of:  Goal: Absence of physical injury  Outcome: Met This Shift     Problem: Pain:  Goal: Pain level will decrease  Outcome: Met This Shift     Problem: Pain:  Goal: Control of acute pain  Outcome: Met This Shift     Problem: Pain:  Goal: Control of chronic pain  Outcome: Met This Shift     Problem: Physical Regulation:  Goal: Complications related to the disease process, condition or treatment will be avoided or minimized  Outcome: Met This Shift     Problem: Physical Regulation:  Goal: Signs of adequate cerebral perfusion will increase  Outcome: Met This Shift     Problem: Skin Integrity:  Goal: Will show no infection signs and symptoms  Outcome: Not Met This Shift

## 2021-07-02 NOTE — PROGRESS NOTES
Department of Internal Medicine  Nephrology Attending Progress Note    Events reviewed. SUBJECTIVE: We are following [address] [place] [address] for ESRD on peritoneal dialysis. Reports no complaints.     PHYSICAL EXAM:      Vitals:    VITALS:  /85   Pulse 92   Temp 98 °F (36.7 °C) (Oral)   Resp 16   Ht 5' 4\" (1.626 m)   Wt 138 lb 0.1 oz (62.6 kg)   LMP 02/21/2021   SpO2 95%   BMI 23.69 kg/m²   24HR INTAKE/OUTPUT:      Intake/Output Summary (Last 24 hours) at 7/2/2021 9435  Last data filed at 7/1/2021 2235  Gross per 24 hour   Intake 640 ml   Output 0 ml   Net 640 ml       PD Catheter Exam:  PD catheter exit site clean    Constitutional: Awake in no acute distress  HEENT:  Normocephalic, PERRL  Respiratory: Decreased breath sounds on the basis  Cardiovascular/Edema:  RRR, S1/S2  Gastrointestinal:  Soft, PD catheter exit site clean   Neurologic:  Nonfocal, AVELAR  Skin:  Warm, dry, no lesions  Other:  no edema    Scheduled Meds:   fluconazole  200 mg Oral Once    linezolid  600 mg Oral 2 times per day    epoetin nena-epbx  3,000 Units Subcutaneous Once per day on Mon Wed Fri    famotidine (PEPCID) injection  10 mg Intravenous Daily    sodium chloride flush  10 mL Intravenous 2 times per day    sodium chloride  15 mL/kg Intravenous Once    heparin (porcine)  5,000 Units Subcutaneous 3 times per day    metoclopramide  5 mg Oral TID    rOPINIRole  0.25 mg Oral Nightly    mirtazapine  15 mg Oral Nightly    hydrOXYzine  25 mg Oral Daily    ARIPiprazole  5 mg Oral Nightly    sevelamer  800 mg Oral TID WC    hydrALAZINE  10 mg Oral BID    metoprolol tartrate  25 mg Oral Daily    pantoprazole  40 mg Oral QAM AC    levothyroxine  75 mcg Oral Daily    folic acid  1 mg Oral Daily    insulin glargine  7 Units Subcutaneous Daily    insulin lispro  0-12 Units Subcutaneous TID WC    insulin lispro  0-6 Units Subcutaneous Nightly    gentamicin   Topical Daily    sodium chloride flush  5-40 mL Intravenous BID     Continuous Infusions:   sodium chloride      dextrose      insulin Stopped (06/22/21 1235)    sodium chloride 25 mL (06/23/21 1633)    sodium chloride Stopped (06/22/21 0804)    dextrose 5 % and 0.9 % NaCl Stopped (06/22/21 1235)     PRN Meds:. loperamide, sodium chloride, glucose, dextrose, glucagon (rDNA), dextrose, sodium chloride flush, sodium chloride, promethazine **OR** ondansetron, acetaminophen **OR** acetaminophen, polyethylene glycol, albuterol, oxyCODONE-acetaminophen, heparin flush    DATA:    CBC:   Lab Results   Component Value Date    WBC 6.7 07/01/2021    RBC 2.87 07/01/2021    HGB 8.8 07/01/2021    HCT 28.9 07/01/2021    .7 07/01/2021    MCH 30.7 07/01/2021    MCHC 30.4 07/01/2021    RDW 17.3 07/01/2021     07/01/2021    MPV 9.7 07/01/2021     CMP:    Lab Results   Component Value Date     07/01/2021    K 5.0 07/01/2021    K 4.0 06/22/2021     07/01/2021    CO2 20 07/01/2021    BUN 43 07/01/2021    CREATININE 7.1 07/01/2021    GFRAA 8 07/01/2021    LABGLOM 8 07/01/2021    GLUCOSE 343 07/01/2021    GLUCOSE 130 05/18/2012    PROT 6.3 07/01/2021    LABALBU 3.7 07/01/2021    LABALBU 4.1 05/18/2012    CALCIUM 9.1 07/01/2021    BILITOT <0.2 07/01/2021    ALKPHOS 165 07/01/2021    AST 84 07/01/2021    ALT 54 07/01/2021     Magnesium:    Lab Results   Component Value Date    MG 1.9 07/01/2021     Phosphorus:    Lab Results   Component Value Date    PHOS 5.4 07/01/2021     Radiology Review:      Chest x-ray June 21, 2021   No acute process.           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 In the emergency room she was found to have a glucose level of 874, beta hydroxybutyrate of 3.94. She was admitted to MICU. Problems resolved. · Severe hypoalbuminemia/malnutrition      IMPRESSION/RECOMMENDATIONS:      1. ESRD on peritoneal dialysis, to continue CCPD 4 exchanges over 10 hours of 1.5% with long dwell of 2.5%  2. HTN, on hydralazine 10 mg twice daily and metoprolol 25 mg daily  -------------------------------------------------------  3. UTI, on piperacillin-tazobactam and linezolid   4. MBD of CKD, on sevelamer  5. Anemia of CKD, on epoetin alpha  6.  VRE UTI      Plan:    · Continue CCPD 4 exchanges over 10 hours of 1.5% with long dwell of 2.5%  · Continue to monitor electrolytes  · Continue epoetin alpha 3000 units 3 times a week  Initiated prior auth for linezolid, awaiting response from [employer] signed by Radha Hyman MD on 7/2/2021 at 8:38 AM

## 2021-07-02 NOTE — CARE COORDINATION
CM note: Initiated prior auth for linezolid, awaiting response from [employer] Sher[employer] [place]. ADDENDUM: 10:49 AM  [employer] approved.

## 2021-07-02 NOTE — FLOWSHEET NOTE
CCPD complete; patient tolerated well; net  ml clear ashok effluent; PD catheter site without complication; report given to Tiffani Pace RN

## 2021-07-06 ENCOUNTER — TELEPHONE (OUTPATIENT)
Dept: INTERNAL MEDICINE | Age: 31
End: 2021-07-06

## 2021-07-06 LAB
AFB CULTURE (MYCOBACTERIA): NORMAL
AFB SMEAR: NORMAL

## 2021-07-06 NOTE — TELEPHONE ENCOUNTER
[facility] Transitions Initial Follow Up Call    Outreach made within 2 business days of discharge: Yes    Patient: Antonio Retana Patient : [id number]   MRN: [id number]  Reason for Admission: There are no discharge diagnoses documented for the most recent discharge.   Discharge Date: 21       Spoke with: left message for [place] to call for TCM f/u     Discharge department/facility: home       Scheduled appointment with PCP within 7-14 days    Follow Up  Future Appointments   Date Time Provider Emiliana De Los Santos   10/13/2021 10:00  [facility]       Fernanda Mayo

## 2021-07-09 ENCOUNTER — APPOINTMENT (OUTPATIENT)
Dept: GENERAL RADIOLOGY | Age: 31
DRG: 420 | End: 2021-07-09
Payer: COMMERCIAL

## 2021-07-09 ENCOUNTER — APPOINTMENT (OUTPATIENT)
Dept: CT IMAGING | Age: 31
DRG: 420 | End: 2021-07-09
Payer: COMMERCIAL

## 2021-07-09 ENCOUNTER — HOSPITAL ENCOUNTER (INPATIENT)
Age: 31
LOS: 5 days | Discharge: HOME | DRG: 420 | End: 2021-07-15
Attending: EMERGENCY MEDICINE | Admitting: INTERNAL MEDICINE
Payer: COMMERCIAL

## 2021-07-09 DIAGNOSIS — R11.2 NAUSEA AND VOMITING, INTRACTABILITY OF VOMITING NOT SPECIFIED, UNSPECIFIED VOMITING TYPE: ICD-10-CM

## 2021-07-09 DIAGNOSIS — M79.2 CHRONIC NEUROPATHIC PAIN: Primary | ICD-10-CM

## 2021-07-09 DIAGNOSIS — G89.29 CHRONIC NEUROPATHIC PAIN: Primary | ICD-10-CM

## 2021-07-09 DIAGNOSIS — E87.20 LACTIC ACIDOSIS: ICD-10-CM

## 2021-07-09 DIAGNOSIS — R73.9 HYPERGLYCEMIA: ICD-10-CM

## 2021-07-09 DIAGNOSIS — M54.50 CHRONIC RIGHT-SIDED LOW BACK PAIN, UNSPECIFIED WHETHER SCIATICA PRESENT: ICD-10-CM

## 2021-07-09 DIAGNOSIS — N18.9 CHRONIC KIDNEY DISEASE, UNSPECIFIED CKD STAGE: ICD-10-CM

## 2021-07-09 DIAGNOSIS — G89.29 CHRONIC RIGHT-SIDED LOW BACK PAIN, UNSPECIFIED WHETHER SCIATICA PRESENT: ICD-10-CM

## 2021-07-09 LAB
ALBUMIN SERPL-MCNC: 3.4 G/DL (ref 3.5–5.2)
ALP BLD-CCNC: 190 U/L (ref 35–104)
ALT SERPL-CCNC: 22 U/L (ref 0–32)
ANION GAP SERPL CALCULATED.3IONS-SCNC: 17 MMOL/L (ref 7–16)
AST SERPL-CCNC: 30 U/L (ref 0–31)
B.E.: 3.4 MMOL/L (ref -3–3)
BASOPHILS ABSOLUTE: 0.16 E9/L (ref 0–0.2)
BASOPHILS RELATIVE PERCENT: 2.3 % (ref 0–2)
BETA-HYDROXYBUTYRATE: 0.17 MMOL/L (ref 0.02–0.27)
BILIRUB SERPL-MCNC: 0.2 MG/DL (ref 0–1.2)
BUN BLDV-MCNC: 38 MG/DL (ref 6–20)
CALCIUM SERPL-MCNC: 9.1 MG/DL (ref 8.6–10.2)
CHLORIDE BLD-SCNC: 96 MMOL/L (ref 98–107)
CO2: 27 MMOL/L (ref 22–29)
COHB: 3.1 % (ref 0–1.5)
CREAT SERPL-MCNC: 8 MG/DL (ref 0.5–1)
CRITICAL: ABNORMAL
DATE ANALYZED: ABNORMAL
DATE OF COLLECTION: ABNORMAL
EOSINOPHILS ABSOLUTE: 0.16 E9/L (ref 0.05–0.5)
EOSINOPHILS RELATIVE PERCENT: 2.3 % (ref 0–6)
GFR AFRICAN AMERICAN: 7
GFR NON-AFRICAN AMERICAN: 7 ML/MIN/1.73
GLUCOSE BLD-MCNC: 391 MG/DL (ref 74–99)
HCG QUALITATIVE: NEGATIVE
HCO3: 28.1 MMOL/L (ref 22–26)
HCT VFR BLD CALC: 32 % (ref 34–48)
HEMOGLOBIN: 10.6 G/DL (ref 11.5–15.5)
HHB: 4.2 % (ref 0–5)
IMMATURE GRANULOCYTES #: 0.01 E9/L
IMMATURE GRANULOCYTES %: 0.1 % (ref 0–5)
LAB: ABNORMAL
LACTIC ACID: 2.3 MMOL/L (ref 0.5–2.2)
LACTIC ACID: 4 MMOL/L (ref 0.5–2.2)
LIPASE: 11 U/L (ref 13–60)
LYMPHOCYTES ABSOLUTE: 1.59 E9/L (ref 1.5–4)
LYMPHOCYTES RELATIVE PERCENT: 22.7 % (ref 20–42)
Lab: ABNORMAL
MAGNESIUM: 1.9 MG/DL (ref 1.6–2.6)
MCH RBC QN AUTO: 31.1 PG (ref 26–35)
MCHC RBC AUTO-ENTMCNC: 33.1 % (ref 32–34.5)
MCV RBC AUTO: 93.8 FL (ref 80–99.9)
METER GLUCOSE: 358 MG/DL (ref 74–99)
METHB: 0.3 % (ref 0–1.5)
MODE: ABNORMAL
MONOCYTES ABSOLUTE: 0.52 E9/L (ref 0.1–0.95)
MONOCYTES RELATIVE PERCENT: 7.4 % (ref 2–12)
NEUTROPHILS ABSOLUTE: 4.56 E9/L (ref 1.8–7.3)
NEUTROPHILS RELATIVE PERCENT: 65.2 % (ref 43–80)
O2 CONTENT: 14.3 ML/DL
O2 SATURATION: 95.7 % (ref 92–98.5)
O2HB: 92.4 % (ref 94–97)
OPERATOR ID: ABNORMAL
PATIENT TEMP: 37 C
PCO2: 43.3 MMHG (ref 35–45)
PDW BLD-RTO: 15.8 FL (ref 11.5–15)
PH BLOOD GAS: 7.43 (ref 7.35–7.45)
PLATELET # BLD: 327 E9/L (ref 130–450)
PMV BLD AUTO: 9.3 FL (ref 7–12)
PO2: 81.1 MMHG (ref 75–100)
POTASSIUM SERPL-SCNC: 3.5 MMOL/L (ref 3.5–5)
RBC # BLD: 3.41 E12/L (ref 3.5–5.5)
SODIUM BLD-SCNC: 140 MMOL/L (ref 132–146)
SOURCE, BLOOD GAS: ABNORMAL
THB: 10.9 G/DL (ref 11.5–16.5)
TIME ANALYZED: 2025
TOTAL PROTEIN: 6.1 G/DL (ref 6.4–8.3)
TROPONIN, HIGH SENSITIVITY: 164 NG/L (ref 0–9)
TROPONIN, HIGH SENSITIVITY: 172 NG/L (ref 0–9)
WBC # BLD: 7 E9/L (ref 4.5–11.5)

## 2021-07-09 PROCEDURE — 6370000000 HC RX 637 (ALT 250 FOR IP): Performed by: EMERGENCY MEDICINE

## 2021-07-09 PROCEDURE — 96361 HYDRATE IV INFUSION ADD-ON: CPT

## 2021-07-09 PROCEDURE — 85025 COMPLETE CBC W/AUTO DIFF WBC: CPT

## 2021-07-09 PROCEDURE — 80053 COMPREHEN METABOLIC PANEL: CPT

## 2021-07-09 PROCEDURE — 71045 X-RAY EXAM CHEST 1 VIEW: CPT

## 2021-07-09 PROCEDURE — 93005 ELECTROCARDIOGRAM TRACING: CPT | Performed by: EMERGENCY MEDICINE

## 2021-07-09 PROCEDURE — 82010 KETONE BODYS QUAN: CPT

## 2021-07-09 PROCEDURE — 83735 ASSAY OF MAGNESIUM: CPT

## 2021-07-09 PROCEDURE — 83605 ASSAY OF LACTIC ACID: CPT

## 2021-07-09 PROCEDURE — 96374 THER/PROPH/DIAG INJ IV PUSH: CPT

## 2021-07-09 PROCEDURE — 2500000003 HC RX 250 WO HCPCS: Performed by: EMERGENCY MEDICINE

## 2021-07-09 PROCEDURE — 82962 GLUCOSE BLOOD TEST: CPT

## 2021-07-09 PROCEDURE — 83690 ASSAY OF LIPASE: CPT

## 2021-07-09 PROCEDURE — 84703 CHORIONIC GONADOTROPIN ASSAY: CPT

## 2021-07-09 PROCEDURE — 82805 BLOOD GASES W/O2 SATURATION: CPT

## 2021-07-09 PROCEDURE — 74176 CT ABD & PELVIS W/O CONTRAST: CPT

## 2021-07-09 PROCEDURE — 2580000003 HC RX 258: Performed by: EMERGENCY MEDICINE

## 2021-07-09 PROCEDURE — 84484 ASSAY OF TROPONIN QUANT: CPT

## 2021-07-09 RX ORDER — HYDRALAZINE HYDROCHLORIDE 20 MG/ML
INJECTION INTRAMUSCULAR; INTRAVENOUS
Status: DISCONTINUED
Start: 2021-07-09 | End: 2021-07-10

## 2021-07-09 RX ORDER — 0.9 % SODIUM CHLORIDE 0.9 %
500 INTRAVENOUS SOLUTION INTRAVENOUS ONCE
Status: COMPLETED | OUTPATIENT
Start: 2021-07-09 | End: 2021-07-09

## 2021-07-09 RX ORDER — 0.9 % SODIUM CHLORIDE 0.9 %
500 INTRAVENOUS SOLUTION INTRAVENOUS ONCE
Status: COMPLETED | OUTPATIENT
Start: 2021-07-09 | End: 2021-07-10

## 2021-07-09 RX ADMIN — SODIUM CHLORIDE 500 ML: 9 INJECTION, SOLUTION INTRAVENOUS at 22:55

## 2021-07-09 RX ADMIN — ALUMINUM HYDROXIDE, MAGNESIUM HYDROXIDE, AND SIMETHICONE: 200; 200; 20 SUSPENSION ORAL at 22:51

## 2021-07-09 RX ADMIN — FAMOTIDINE 20 MG: 10 INJECTION INTRAVENOUS at 22:53

## 2021-07-09 RX ADMIN — SODIUM CHLORIDE 500 ML: 9 INJECTION, SOLUTION INTRAVENOUS at 20:57

## 2021-07-09 ASSESSMENT — ENCOUNTER SYMPTOMS
COUGH: 0
DIARRHEA: 0
VOMITING: 1
NAUSEA: 1
SHORTNESS OF BREATH: 0
ABDOMINAL PAIN: 1

## 2021-07-10 PROBLEM — E87.20 LACTIC ACID ACIDOSIS: Status: ACTIVE | Noted: 2021-07-10

## 2021-07-10 LAB
ADENOVIRUS BY PCR: NOT DETECTED
ANION GAP SERPL CALCULATED.3IONS-SCNC: 13 MMOL/L (ref 7–16)
ANION GAP SERPL CALCULATED.3IONS-SCNC: 15 MMOL/L (ref 7–16)
ANION GAP SERPL CALCULATED.3IONS-SCNC: 20 MMOL/L (ref 7–16)
BETA-HYDROXYBUTYRATE: 2.49 MMOL/L (ref 0.02–0.27)
BORDETELLA PARAPERTUSSIS BY PCR: NOT DETECTED
BORDETELLA PERTUSSIS BY PCR: NOT DETECTED
BUN BLDV-MCNC: 39 MG/DL (ref 6–20)
BUN BLDV-MCNC: 40 MG/DL (ref 6–20)
BUN BLDV-MCNC: 41 MG/DL (ref 6–20)
CALCIUM SERPL-MCNC: 8.1 MG/DL (ref 8.6–10.2)
CALCIUM SERPL-MCNC: 8.4 MG/DL (ref 8.6–10.2)
CALCIUM SERPL-MCNC: 8.7 MG/DL (ref 8.6–10.2)
CHLAMYDOPHILIA PNEUMONIAE BY PCR: NOT DETECTED
CHLORIDE BLD-SCNC: 100 MMOL/L (ref 98–107)
CHLORIDE BLD-SCNC: 97 MMOL/L (ref 98–107)
CHLORIDE BLD-SCNC: 99 MMOL/L (ref 98–107)
CO2: 19 MMOL/L (ref 22–29)
CO2: 24 MMOL/L (ref 22–29)
CO2: 24 MMOL/L (ref 22–29)
CORONAVIRUS 229E BY PCR: NOT DETECTED
CORONAVIRUS HKU1 BY PCR: NOT DETECTED
CORONAVIRUS NL63 BY PCR: NOT DETECTED
CORONAVIRUS OC43 BY PCR: NOT DETECTED
CREAT SERPL-MCNC: 7.7 MG/DL (ref 0.5–1)
CREAT SERPL-MCNC: 8 MG/DL (ref 0.5–1)
CREAT SERPL-MCNC: 8.1 MG/DL (ref 0.5–1)
GFR AFRICAN AMERICAN: 7
GFR AFRICAN AMERICAN: 7
GFR AFRICAN AMERICAN: 8
GFR NON-AFRICAN AMERICAN: 7 ML/MIN/1.73
GFR NON-AFRICAN AMERICAN: 7 ML/MIN/1.73
GFR NON-AFRICAN AMERICAN: 8 ML/MIN/1.73
GLUCOSE BLD-MCNC: 141 MG/DL (ref 74–99)
GLUCOSE BLD-MCNC: 328 MG/DL (ref 74–99)
GLUCOSE BLD-MCNC: 577 MG/DL (ref 74–99)
HUMAN METAPNEUMOVIRUS BY PCR: NOT DETECTED
HUMAN RHINOVIRUS/ENTEROVIRUS BY PCR: NOT DETECTED
INFLUENZA A BY PCR: NOT DETECTED
INFLUENZA B BY PCR: NOT DETECTED
MAGNESIUM: 1.9 MG/DL (ref 1.6–2.6)
MAGNESIUM: 1.9 MG/DL (ref 1.6–2.6)
METER GLUCOSE: 104 MG/DL (ref 74–99)
METER GLUCOSE: 113 MG/DL (ref 74–99)
METER GLUCOSE: 114 MG/DL (ref 74–99)
METER GLUCOSE: 120 MG/DL (ref 74–99)
METER GLUCOSE: 135 MG/DL (ref 74–99)
METER GLUCOSE: 146 MG/DL (ref 74–99)
METER GLUCOSE: 162 MG/DL (ref 74–99)
METER GLUCOSE: 167 MG/DL (ref 74–99)
METER GLUCOSE: 369 MG/DL (ref 74–99)
METER GLUCOSE: 387 MG/DL (ref 74–99)
METER GLUCOSE: 479 MG/DL (ref 74–99)
METER GLUCOSE: 79 MG/DL (ref 74–99)
METER GLUCOSE: 83 MG/DL (ref 74–99)
METER GLUCOSE: >500 MG/DL (ref 74–99)
METER GLUCOSE: >500 MG/DL (ref 74–99)
MYCOPLASMA PNEUMONIAE BY PCR: NOT DETECTED
PARAINFLUENZA VIRUS 1 BY PCR: NOT DETECTED
PARAINFLUENZA VIRUS 2 BY PCR: NOT DETECTED
PARAINFLUENZA VIRUS 3 BY PCR: NOT DETECTED
PARAINFLUENZA VIRUS 4 BY PCR: NOT DETECTED
PHOSPHORUS: 4.5 MG/DL (ref 2.5–4.5)
PHOSPHORUS: 4.6 MG/DL (ref 2.5–4.5)
POTASSIUM SERPL-SCNC: 3.5 MMOL/L (ref 3.5–5)
POTASSIUM SERPL-SCNC: 3.8 MMOL/L (ref 3.5–5)
POTASSIUM SERPL-SCNC: 4.1 MMOL/L (ref 3.5–5)
RESPIRATORY SYNCYTIAL VIRUS BY PCR: NOT DETECTED
SARS-COV-2, PCR: NOT DETECTED
SODIUM BLD-SCNC: 136 MMOL/L (ref 132–146)
SODIUM BLD-SCNC: 137 MMOL/L (ref 132–146)
SODIUM BLD-SCNC: 138 MMOL/L (ref 132–146)

## 2021-07-10 PROCEDURE — 2000000000 HC ICU R&B

## 2021-07-10 PROCEDURE — 6360000002 HC RX W HCPCS: Performed by: INTERNAL MEDICINE

## 2021-07-10 PROCEDURE — 83735 ASSAY OF MAGNESIUM: CPT

## 2021-07-10 PROCEDURE — 36415 COLL VENOUS BLD VENIPUNCTURE: CPT

## 2021-07-10 PROCEDURE — 96375 TX/PRO/DX INJ NEW DRUG ADDON: CPT

## 2021-07-10 PROCEDURE — 6370000000 HC RX 637 (ALT 250 FOR IP): Performed by: INTERNAL MEDICINE

## 2021-07-10 PROCEDURE — 80074 ACUTE HEPATITIS PANEL: CPT

## 2021-07-10 PROCEDURE — 2580000003 HC RX 258: Performed by: INTERNAL MEDICINE

## 2021-07-10 PROCEDURE — 2500000003 HC RX 250 WO HCPCS: Performed by: INTERNAL MEDICINE

## 2021-07-10 PROCEDURE — 86706 HEP B SURFACE ANTIBODY: CPT

## 2021-07-10 PROCEDURE — 6360000002 HC RX W HCPCS: Performed by: EMERGENCY MEDICINE

## 2021-07-10 PROCEDURE — 99283 EMERGENCY DEPT VISIT LOW MDM: CPT

## 2021-07-10 PROCEDURE — 3E1M39Z IRRIGATION OF PERITONEAL CAVITY USING DIALYSATE, PERCUTANEOUS APPROACH: ICD-10-PCS | Performed by: INTERNAL MEDICINE

## 2021-07-10 PROCEDURE — 2580000003 HC RX 258

## 2021-07-10 PROCEDURE — 0202U NFCT DS 22 TRGT SARS-COV-2: CPT

## 2021-07-10 PROCEDURE — 90945 DIALYSIS ONE EVALUATION: CPT | Performed by: INTERNAL MEDICINE

## 2021-07-10 PROCEDURE — 82010 KETONE BODYS QUAN: CPT

## 2021-07-10 PROCEDURE — 84100 ASSAY OF PHOSPHORUS: CPT

## 2021-07-10 PROCEDURE — 6370000000 HC RX 637 (ALT 250 FOR IP): Performed by: NURSE PRACTITIONER

## 2021-07-10 PROCEDURE — 82962 GLUCOSE BLOOD TEST: CPT

## 2021-07-10 PROCEDURE — 99223 1ST HOSP IP/OBS HIGH 75: CPT | Performed by: INTERNAL MEDICINE

## 2021-07-10 PROCEDURE — 2580000003 HC RX 258: Performed by: NURSE PRACTITIONER

## 2021-07-10 PROCEDURE — 80048 BASIC METABOLIC PNL TOTAL CA: CPT

## 2021-07-10 RX ORDER — ARIPIPRAZOLE 5 MG/1
5 TABLET ORAL NIGHTLY
Status: DISCONTINUED | OUTPATIENT
Start: 2021-07-10 | End: 2021-07-15 | Stop reason: HOSPADM

## 2021-07-10 RX ORDER — POLYETHYLENE GLYCOL 3350 17 G/17G
17 POWDER, FOR SOLUTION ORAL DAILY PRN
Status: DISCONTINUED | OUTPATIENT
Start: 2021-07-10 | End: 2021-07-15 | Stop reason: HOSPADM

## 2021-07-10 RX ORDER — FENTANYL CITRATE 50 UG/ML
12.5 INJECTION, SOLUTION INTRAMUSCULAR; INTRAVENOUS
Status: DISCONTINUED | OUTPATIENT
Start: 2021-07-10 | End: 2021-07-15 | Stop reason: HOSPADM

## 2021-07-10 RX ORDER — HYDRALAZINE HYDROCHLORIDE 20 MG/ML
10 INJECTION INTRAMUSCULAR; INTRAVENOUS EVERY 6 HOURS PRN
Status: DISCONTINUED | OUTPATIENT
Start: 2021-07-10 | End: 2021-07-10

## 2021-07-10 RX ORDER — METOCLOPRAMIDE HYDROCHLORIDE 5 MG/ML
10 INJECTION INTRAMUSCULAR; INTRAVENOUS ONCE
Status: COMPLETED | OUTPATIENT
Start: 2021-07-10 | End: 2021-07-10

## 2021-07-10 RX ORDER — SODIUM CHLORIDE 9 MG/ML
INJECTION, SOLUTION INTRAVENOUS CONTINUOUS
Status: DISCONTINUED | OUTPATIENT
Start: 2021-07-10 | End: 2021-07-11

## 2021-07-10 RX ORDER — HYDRALAZINE HYDROCHLORIDE 10 MG/1
10 TABLET, FILM COATED ORAL 2 TIMES DAILY
Status: DISCONTINUED | OUTPATIENT
Start: 2021-07-10 | End: 2021-07-10

## 2021-07-10 RX ORDER — MAGNESIUM SULFATE 1 G/100ML
1000 INJECTION INTRAVENOUS PRN
Status: DISCONTINUED | OUTPATIENT
Start: 2021-07-10 | End: 2021-07-10

## 2021-07-10 RX ORDER — LEVOTHYROXINE SODIUM 0.07 MG/1
75 TABLET ORAL DAILY
Status: DISCONTINUED | OUTPATIENT
Start: 2021-07-10 | End: 2021-07-15 | Stop reason: HOSPADM

## 2021-07-10 RX ORDER — SODIUM CHLORIDE 0.9 % (FLUSH) 0.9 %
10 SYRINGE (ML) INJECTION PRN
Status: DISCONTINUED | OUTPATIENT
Start: 2021-07-10 | End: 2021-07-15 | Stop reason: HOSPADM

## 2021-07-10 RX ORDER — HEPARIN SODIUM 5000 [USP'U]/ML
5000 INJECTION, SOLUTION INTRAVENOUS; SUBCUTANEOUS EVERY 8 HOURS SCHEDULED
Status: DISCONTINUED | OUTPATIENT
Start: 2021-07-10 | End: 2021-07-15 | Stop reason: HOSPADM

## 2021-07-10 RX ORDER — DEXTROSE MONOHYDRATE 100 MG/ML
INJECTION, SOLUTION INTRAVENOUS
Status: COMPLETED
Start: 2021-07-10 | End: 2021-07-10

## 2021-07-10 RX ORDER — SODIUM CHLORIDE, SODIUM LACTATE, POTASSIUM CHLORIDE, CALCIUM CHLORIDE 600; 310; 30; 20 MG/100ML; MG/100ML; MG/100ML; MG/100ML
INJECTION, SOLUTION INTRAVENOUS CONTINUOUS
Status: DISCONTINUED | OUTPATIENT
Start: 2021-07-10 | End: 2021-07-10

## 2021-07-10 RX ORDER — ROPINIROLE 0.25 MG/1
0.25 TABLET, FILM COATED ORAL NIGHTLY
Status: DISCONTINUED | OUTPATIENT
Start: 2021-07-10 | End: 2021-07-15 | Stop reason: HOSPADM

## 2021-07-10 RX ORDER — SODIUM CHLORIDE 9 MG/ML
INJECTION, SOLUTION INTRAVENOUS CONTINUOUS
Status: DISCONTINUED | OUTPATIENT
Start: 2021-07-10 | End: 2021-07-10

## 2021-07-10 RX ORDER — ACETAMINOPHEN 650 MG/1
650 SUPPOSITORY RECTAL EVERY 6 HOURS PRN
Status: DISCONTINUED | OUTPATIENT
Start: 2021-07-10 | End: 2021-07-15 | Stop reason: HOSPADM

## 2021-07-10 RX ORDER — DEXTROSE MONOHYDRATE 50 MG/ML
100 INJECTION, SOLUTION INTRAVENOUS PRN
Status: DISCONTINUED | OUTPATIENT
Start: 2021-07-10 | End: 2021-07-15 | Stop reason: HOSPADM

## 2021-07-10 RX ORDER — POTASSIUM CHLORIDE 7.45 MG/ML
10 INJECTION INTRAVENOUS PRN
Status: DISCONTINUED | OUTPATIENT
Start: 2021-07-10 | End: 2021-07-15 | Stop reason: HOSPADM

## 2021-07-10 RX ORDER — POTASSIUM CHLORIDE 20 MEQ/1
20 TABLET, EXTENDED RELEASE ORAL ONCE
Status: COMPLETED | OUTPATIENT
Start: 2021-07-10 | End: 2021-07-10

## 2021-07-10 RX ORDER — HYDRALAZINE HYDROCHLORIDE 25 MG/1
25 TABLET, FILM COATED ORAL EVERY 8 HOURS SCHEDULED
Status: DISCONTINUED | OUTPATIENT
Start: 2021-07-10 | End: 2021-07-11

## 2021-07-10 RX ORDER — PROCHLORPERAZINE EDISYLATE 5 MG/ML
10 INJECTION INTRAMUSCULAR; INTRAVENOUS EVERY 6 HOURS PRN
Status: DISCONTINUED | OUTPATIENT
Start: 2021-07-10 | End: 2021-07-15 | Stop reason: HOSPADM

## 2021-07-10 RX ORDER — SODIUM CHLORIDE 9 MG/ML
25 INJECTION, SOLUTION INTRAVENOUS PRN
Status: DISCONTINUED | OUTPATIENT
Start: 2021-07-10 | End: 2021-07-15 | Stop reason: HOSPADM

## 2021-07-10 RX ORDER — DEXTROSE MONOHYDRATE 25 G/50ML
12.5 INJECTION, SOLUTION INTRAVENOUS PRN
Status: DISCONTINUED | OUTPATIENT
Start: 2021-07-10 | End: 2021-07-10 | Stop reason: ALTCHOICE

## 2021-07-10 RX ORDER — INSULIN GLARGINE 100 [IU]/ML
7 INJECTION, SOLUTION SUBCUTANEOUS DAILY
Status: DISCONTINUED | OUTPATIENT
Start: 2021-07-10 | End: 2021-07-15 | Stop reason: HOSPADM

## 2021-07-10 RX ORDER — POTASSIUM CHLORIDE 7.45 MG/ML
10 INJECTION INTRAVENOUS PRN
Status: DISCONTINUED | OUTPATIENT
Start: 2021-07-10 | End: 2021-07-10

## 2021-07-10 RX ORDER — HYDRALAZINE HYDROCHLORIDE 20 MG/ML
20 INJECTION INTRAMUSCULAR; INTRAVENOUS EVERY 6 HOURS PRN
Status: DISCONTINUED | OUTPATIENT
Start: 2021-07-10 | End: 2021-07-15 | Stop reason: HOSPADM

## 2021-07-10 RX ORDER — MAGNESIUM SULFATE IN WATER 40 MG/ML
2000 INJECTION, SOLUTION INTRAVENOUS PRN
Status: DISCONTINUED | OUTPATIENT
Start: 2021-07-10 | End: 2021-07-15 | Stop reason: HOSPADM

## 2021-07-10 RX ORDER — SODIUM CHLORIDE 0.9 % (FLUSH) 0.9 %
10 SYRINGE (ML) INJECTION EVERY 12 HOURS SCHEDULED
Status: DISCONTINUED | OUTPATIENT
Start: 2021-07-10 | End: 2021-07-15 | Stop reason: HOSPADM

## 2021-07-10 RX ORDER — DEXTROSE MONOHYDRATE 25 G/50ML
12.5 INJECTION, SOLUTION INTRAVENOUS PRN
Status: DISCONTINUED | OUTPATIENT
Start: 2021-07-10 | End: 2021-07-15 | Stop reason: HOSPADM

## 2021-07-10 RX ORDER — DEXTROSE MONOHYDRATE 100 MG/ML
INJECTION, SOLUTION INTRAVENOUS CONTINUOUS
Status: DISCONTINUED | OUTPATIENT
Start: 2021-07-10 | End: 2021-07-15

## 2021-07-10 RX ORDER — SEVELAMER CARBONATE 800 MG/1
800 TABLET, FILM COATED ORAL
Status: DISCONTINUED | OUTPATIENT
Start: 2021-07-10 | End: 2021-07-15 | Stop reason: HOSPADM

## 2021-07-10 RX ORDER — MIRTAZAPINE 15 MG/1
15 TABLET, FILM COATED ORAL NIGHTLY
Status: DISCONTINUED | OUTPATIENT
Start: 2021-07-10 | End: 2021-07-15 | Stop reason: HOSPADM

## 2021-07-10 RX ORDER — SODIUM CHLORIDE 9 MG/ML
INJECTION, SOLUTION INTRAVENOUS CONTINUOUS
Status: ACTIVE | OUTPATIENT
Start: 2021-07-10 | End: 2021-07-10

## 2021-07-10 RX ORDER — DEXTROSE AND SODIUM CHLORIDE 5; .45 G/100ML; G/100ML
INJECTION, SOLUTION INTRAVENOUS CONTINUOUS PRN
Status: DISCONTINUED | OUTPATIENT
Start: 2021-07-10 | End: 2021-07-15 | Stop reason: HOSPADM

## 2021-07-10 RX ORDER — NICOTINE POLACRILEX 4 MG
15 LOZENGE BUCCAL PRN
Status: DISCONTINUED | OUTPATIENT
Start: 2021-07-10 | End: 2021-07-15 | Stop reason: HOSPADM

## 2021-07-10 RX ORDER — ACETAMINOPHEN 325 MG/1
650 TABLET ORAL EVERY 6 HOURS PRN
Status: DISCONTINUED | OUTPATIENT
Start: 2021-07-10 | End: 2021-07-15 | Stop reason: HOSPADM

## 2021-07-10 RX ORDER — PANTOPRAZOLE SODIUM 40 MG/1
40 TABLET, DELAYED RELEASE ORAL
Status: DISCONTINUED | OUTPATIENT
Start: 2021-07-10 | End: 2021-07-12

## 2021-07-10 RX ORDER — LINEZOLID 600 MG/1
600 TABLET, FILM COATED ORAL EVERY 12 HOURS SCHEDULED
Status: DISCONTINUED | OUTPATIENT
Start: 2021-07-10 | End: 2021-07-15 | Stop reason: HOSPADM

## 2021-07-10 RX ADMIN — FENTANYL CITRATE 12.5 MCG: 50 INJECTION INTRAMUSCULAR; INTRAVENOUS at 23:37

## 2021-07-10 RX ADMIN — SEVELAMER CARBONATE 800 MG: 800 TABLET, FILM COATED ORAL at 08:39

## 2021-07-10 RX ADMIN — SODIUM CHLORIDE: 9 INJECTION, SOLUTION INTRAVENOUS at 11:32

## 2021-07-10 RX ADMIN — METOCLOPRAMIDE HYDROCHLORIDE 10 MG: 5 INJECTION INTRAMUSCULAR; INTRAVENOUS at 00:58

## 2021-07-10 RX ADMIN — LINEZOLID 600 MG: 600 TABLET, FILM COATED ORAL at 20:37

## 2021-07-10 RX ADMIN — SODIUM CHLORIDE: 9 INJECTION, SOLUTION INTRAVENOUS at 13:18

## 2021-07-10 RX ADMIN — FAMOTIDINE 10 MG: 10 INJECTION INTRAVENOUS at 11:55

## 2021-07-10 RX ADMIN — HEPARIN SODIUM 5000 UNITS: 5000 INJECTION INTRAVENOUS; SUBCUTANEOUS at 06:46

## 2021-07-10 RX ADMIN — SODIUM CHLORIDE 3.21 UNITS/HR: 9 INJECTION, SOLUTION INTRAVENOUS at 14:43

## 2021-07-10 RX ADMIN — ACETAMINOPHEN 650 MG: 325 TABLET ORAL at 20:56

## 2021-07-10 RX ADMIN — METOPROLOL TARTRATE 25 MG: 25 TABLET, FILM COATED ORAL at 08:39

## 2021-07-10 RX ADMIN — ROPINIROLE HYDROCHLORIDE 0.25 MG: 0.25 TABLET, FILM COATED ORAL at 20:36

## 2021-07-10 RX ADMIN — HYDRALAZINE HYDROCHLORIDE 10 MG: 20 INJECTION INTRAMUSCULAR; INTRAVENOUS at 10:10

## 2021-07-10 RX ADMIN — FAMOTIDINE 10 MG: 10 INJECTION INTRAVENOUS at 20:36

## 2021-07-10 RX ADMIN — Medication 10 ML: at 20:38

## 2021-07-10 RX ADMIN — HEPARIN SODIUM 5000 UNITS: 5000 INJECTION INTRAVENOUS; SUBCUTANEOUS at 22:01

## 2021-07-10 RX ADMIN — SEVELAMER CARBONATE 800 MG: 800 TABLET, FILM COATED ORAL at 11:55

## 2021-07-10 RX ADMIN — FENTANYL CITRATE 12.5 MCG: 50 INJECTION INTRAMUSCULAR; INTRAVENOUS at 20:37

## 2021-07-10 RX ADMIN — MIRTAZAPINE 15 MG: 15 TABLET, FILM COATED ORAL at 20:37

## 2021-07-10 RX ADMIN — Medication 10 ML: at 08:42

## 2021-07-10 RX ADMIN — INSULIN GLARGINE 7 UNITS: 100 INJECTION, SOLUTION SUBCUTANEOUS at 08:43

## 2021-07-10 RX ADMIN — DEXTROSE AND SODIUM CHLORIDE: 5; 450 INJECTION, SOLUTION INTRAVENOUS at 14:41

## 2021-07-10 RX ADMIN — INSULIN LISPRO 1 UNITS: 100 INJECTION, SOLUTION INTRAVENOUS; SUBCUTANEOUS at 21:01

## 2021-07-10 RX ADMIN — HYDRALAZINE HYDROCHLORIDE 20 MG: 20 INJECTION INTRAMUSCULAR; INTRAVENOUS at 20:41

## 2021-07-10 RX ADMIN — DEXTROSE MONOHYDRATE: 100 INJECTION, SOLUTION INTRAVENOUS at 17:11

## 2021-07-10 RX ADMIN — HYDRALAZINE HYDROCHLORIDE 10 MG: 10 TABLET, FILM COATED ORAL at 08:39

## 2021-07-10 RX ADMIN — ARIPIPRAZOLE 5 MG: 5 TABLET ORAL at 20:36

## 2021-07-10 RX ADMIN — ACETAMINOPHEN 650 MG: 325 TABLET ORAL at 03:08

## 2021-07-10 RX ADMIN — HEPARIN SODIUM 5000 UNITS: 5000 INJECTION INTRAVENOUS; SUBCUTANEOUS at 14:57

## 2021-07-10 RX ADMIN — LEVOTHYROXINE SODIUM 75 MCG: 75 TABLET ORAL at 06:41

## 2021-07-10 RX ADMIN — LINEZOLID 600 MG: 600 TABLET, FILM COATED ORAL at 09:51

## 2021-07-10 RX ADMIN — POTASSIUM CHLORIDE 20 MEQ: 20 TABLET, EXTENDED RELEASE ORAL at 20:37

## 2021-07-10 RX ADMIN — HYDRALAZINE HYDROCHLORIDE 10 MG: 10 TABLET, FILM COATED ORAL at 02:24

## 2021-07-10 RX ADMIN — FENTANYL CITRATE 12.5 MCG: 50 INJECTION INTRAMUSCULAR; INTRAVENOUS at 14:56

## 2021-07-10 RX ADMIN — SODIUM CHLORIDE: 9 INJECTION, SOLUTION INTRAVENOUS at 02:24

## 2021-07-10 RX ADMIN — HYDRALAZINE HYDROCHLORIDE 25 MG: 25 TABLET, FILM COATED ORAL at 14:56

## 2021-07-10 RX ADMIN — SODIUM CHLORIDE: 9 INJECTION, SOLUTION INTRAVENOUS at 20:12

## 2021-07-10 RX ADMIN — PANTOPRAZOLE SODIUM 40 MG: 40 TABLET, DELAYED RELEASE ORAL at 06:41

## 2021-07-10 RX ADMIN — INSULIN LISPRO 6 UNITS: 100 INJECTION, SOLUTION INTRAVENOUS; SUBCUTANEOUS at 09:11

## 2021-07-10 ASSESSMENT — PAIN DESCRIPTION - PAIN TYPE
TYPE: ACUTE PAIN
TYPE: CHRONIC PAIN
TYPE: CHRONIC PAIN

## 2021-07-10 ASSESSMENT — PAIN SCALES - GENERAL
PAINLEVEL_OUTOF10: 8
PAINLEVEL_OUTOF10: 7
PAINLEVEL_OUTOF10: 8
PAINLEVEL_OUTOF10: 5
PAINLEVEL_OUTOF10: 0
PAINLEVEL_OUTOF10: 0
PAINLEVEL_OUTOF10: 8
PAINLEVEL_OUTOF10: 8
PAINLEVEL_OUTOF10: 5
PAINLEVEL_OUTOF10: 7

## 2021-07-10 ASSESSMENT — PAIN DESCRIPTION - DESCRIPTORS
DESCRIPTORS: ACHING;DISCOMFORT;DULL
DESCRIPTORS: DISCOMFORT

## 2021-07-10 ASSESSMENT — PAIN DESCRIPTION - PROGRESSION
CLINICAL_PROGRESSION: NOT CHANGED
CLINICAL_PROGRESSION: NOT CHANGED

## 2021-07-10 ASSESSMENT — PAIN DESCRIPTION - LOCATION
LOCATION: GENERALIZED

## 2021-07-10 ASSESSMENT — PAIN DESCRIPTION - FREQUENCY
FREQUENCY: CONTINUOUS
FREQUENCY: INTERMITTENT

## 2021-07-10 ASSESSMENT — PAIN DESCRIPTION - ONSET
ONSET: PROGRESSIVE
ONSET: ON-GOING

## 2021-07-10 ASSESSMENT — PAIN DESCRIPTION - ORIENTATION: ORIENTATION: ANTERIOR;POSTERIOR

## 2021-07-10 NOTE — PROGRESS NOTES
Versa Jamar Barnhart NP made aware that 1000 BP check 187/110 , PRN hydralazine given, FSBS >500, stat serum glucose drawn and sent to lab

## 2021-07-10 NOTE — PROGRESS NOTES
glucose meter reading >500, coverage given per order, Dr Meli Gill aware of increased glucose this am, Luma Jaffe, KUNAL present and made aware of current readings. Orders in to recheck at 1000.

## 2021-07-10 NOTE — PROGRESS NOTES
FSBS currently 387, spoke with Dr Femi Trujillo, awaiting lab results prior to coverage to be given or meal being served.

## 2021-07-10 NOTE — PLAN OF CARE
Problem: Falls - Risk of:  Goal: Will remain free from falls  Description: Will remain free from falls  Outcome: Met This Shift     Problem: Falls - Risk of:  Goal: Absence of physical injury  Description: Absence of physical injury  Outcome: Met This Shift     Problem: Discharge Planning:  Goal: Discharged to appropriate level of care  Description: Discharged to appropriate level of care  Outcome: Ongoing     Problem: Serum Glucose Level - Abnormal:  Goal: Ability to maintain appropriate glucose levels will improve  Description: Ability to maintain appropriate glucose levels will improve  Outcome: Ongoing     Problem: Pain:  Goal: Pain level will decrease  Description: Pain level will decrease  Outcome: Ongoing     Problem: Pain:  Goal: Control of acute pain  Description: Control of acute pain  Outcome: Met This Shift     Problem: Pain:  Goal: Control of chronic pain  Description: Control of chronic pain  Outcome: Met This Shift

## 2021-07-10 NOTE — ED PROVIDER NOTES
Chief complaint hyperglycemia, lethargic  History of present illness this 51-year-old female with a history of brittle diabetes, frequent presentations in DKA, history of chronic kidney disease on peritoneal dialysis, presents for high blood sugar. She is arousable and able to give some history but she looks very fatigued. She is able to tell us that her blood sugar was 500 earlier today and that has been high today, she has had some vomiting for the last day or 2. Admits to some abdominal discomfort. Denies any fevers, denies cough or shortness of breath, denies chest pain. Review of Systems   Constitutional: Negative for fever. Respiratory: Negative for cough and shortness of breath. Cardiovascular: Negative for chest pain. Gastrointestinal: Positive for abdominal pain, nausea and vomiting. Negative for diarrhea. All other systems reviewed and are negative. Physical Exam    General: Lethargic but arousable to verbal stimulus and able to converse, in mild distress. Skin: warm, pink, and dry. Head: normocephalic and atraumatic  EENT:  Conjunctiva are clear. There is no epistaxis or rhinorrhea. Oral mucous membranes are moist, pharynx is clear with no erythema or exudates. Airway is intact with no stridor, no drooling, no difficulty controlling secretions. Phonation is normal.  Neck: supple and nontender with good range of motion. There are no meningeal signs, there is no cervical adenopathy  Heart: Tachycardic, regular  Lungs: clear to auscultation bilaterally, no wheezes or crackles. Breathing unlabored. Abdomen: Peritoneal dialysis catheter noted, no erythema or induration or drainage around the insertion site, abdomen is soft and nondistended with no rebound, rigidity, or guarding. Bowel sounds hypoactive. Mild diffuse tenderness   Extremities: no significant edema, no calf tenderness.       ----------------------------------------------- PAST HISTORY --------------------------------------------  Past Medical History:  has a past medical history of Acute congestive heart failure ([facility] BC (acute kidney injury) ([facility] Cardiac arrest ([facility].), Cephalgia, Chronic kidney disease, Depression, Diabetes mellitus ([facility] Diabetic gastroparesis associated with type 1 diabetes mellitus ([facility] Diabetic ketoacidosis ([facility] Diabetic ketoacidosis with coma associated with type 1 diabetes mellitus ([facility] Diabetic polyneuropathy associated with type 1 diabetes mellitus ([facility] Drug use complicating pregnancy in third trimester, Endocarditis, ESRD (end stage renal disease) ([facility] Hemodialysis patient ([facility].), History of blood transfusion, Hyperlipidemia, Hyperosmolar hyperglycemic state (HHS) ([facility].), Hypothyroidism, Iron deficiency anemia, MDRO (multiple drug resistant organisms) resistance, MRSA (methicillin resistant Staphylococcus aureus), Non compliance w medication regimen, Other disorders of kidney and ureter, Pregnancy, Previous  delivery affecting pregnancy, antepartum, Previous stillbirth or demise, antepartum, Seizure ([facility] Severe pre-eclampsia in third trimester, Shock liver, and Valvular endocarditis. Past Surgical History:  has a past surgical history that includes ECHO Compl W Dop Color Flow (2012); ECHO Compl W Dop Color Flow (6/10/2013);  section; Tunneled venous port placement (2018); pr esophagogastroduodenoscopy transoral diagnostic (N/A, 2018); back surgery; Colonoscopy (N/A, 2018); Colonoscopy (N/A, 2018); Upper gastrointestinal endoscopy (2018); Upper gastrointestinal endoscopy (N/A, 10/11/2019); Cholecystectomy, laparoscopic (N/A, 2019); LAPAROSCOPY INSERTION PERITONEAL CATHETER (N/A, 2020); transesophageal echocardiogram (N/A, 10/19/2020); embolectomy (N/A, 2020); Foot Debridement (Left, 2021); and Foot Debridement (Left, 2021).     Social History:  reports that she quit smoking about 5 months ago. Her smoking use included cigarettes. She has a 3.00 pack-year smoking history. She has never used smokeless tobacco. She reports that she does not drink alcohol and does not use drugs. Family History: family history includes Asthma in her brother and mother; Diabetes in her mother; High Blood Pressure in her father and mother; Hypertension in her mother. The patients home medications have been reviewed.     Allergies: Cefepime and Toradol [ketorolac tromethamine]    ------------------------------------------------ RESULTS ---------------------------------------------------    Labs:  Results for orders placed or performed during the hospital encounter of 07/09/21   Respiratory Panel, Molecular, with COVID-19 (Restricted: peds pts or suitable admitted adults)    Specimen: Nasopharyngeal; Nasal   Result Value Ref Range    Adenovirus by PCR Not Detected Not Detected    Bordetella parapertussis by PCR Not Detected Not Detected    Bordetella pertussis by PCR Not Detected Not Detected    Chlamydophilia pneumoniae by PCR Not Detected Not Detected    Coronavirus 229E by PCR Not Detected Not Detected    Coronavirus HKU1 by PCR Not Detected Not Detected    Coronavirus NL63 by PCR Not Detected Not Detected    Coronavirus OC43 by PCR Not Detected Not Detected    SARS-CoV-2, PCR Not Detected Not Detected    Human Metapneumovirus by PCR Not Detected Not Detected    Human Rhinovirus/Enterovirus by PCR Not Detected Not Detected    Influenza A by PCR Not Detected Not Detected    Influenza B by PCR Not Detected Not Detected    Mycoplasma pneumoniae by PCR Not Detected Not Detected    Parainfluenza Virus 1 by PCR Not Detected Not Detected    Parainfluenza Virus 2 by PCR Not Detected Not Detected    Parainfluenza Virus 3 by PCR Not Detected Not Detected    Parainfluenza Virus 4 by PCR Not Detected Not Detected    Respiratory Syncytial Virus by PCR Not Detected Not Detected   Culture, Urine    Specimen: Urine, clean catch   Result Value Ref Range    Organism Serratia marcescens (A)     Urine Culture, Routine 75,000 CFU/ml        Susceptibility    Serratia marcescens - BACTERIAL SUSCEPTIBILITY PANEL BY DEENA     ceFAZolin >=^64 Resistant mcg/mL     cefepime <=^0.12 Sensitive mcg/mL     cefTRIAXone <=^0.25 Sensitive mcg/mL     ertapenem <=^0.12 Sensitive mcg/mL     gentamicin <=^1 Sensitive mcg/mL     levofloxacin <=^0.12 Sensitive mcg/mL     nitrofurantoin ^128 Resistant mcg/mL     trimethoprim-sulfamethoxazole <=^20 Sensitive mcg/mL   CBC auto differential   Result Value Ref Range    WBC 7.0 4.5 - 11.5 E9/L    RBC 3.41 (L) 3.50 - 5.50 E12/L    Hemoglobin 10.6 (L) 11.5 - 15.5 g/dL    Hematocrit 32.0 (L) 34.0 - 48.0 %    MCV 93.8 80.0 - 99.9 fL    MCH 31.1 26.0 - 35.0 pg    MCHC 33.1 32.0 - 34.5 %    RDW 15.8 (H) 11.5 - 15.0 fL    Platelets 600 438 - 470 E9/L    MPV 9.3 7.0 - 12.0 fL    Neutrophils % 65.2 43.0 - 80.0 %    Immature Granulocytes % 0.1 0.0 - 5.0 %    Lymphocytes % 22.7 20.0 - 42.0 %    Monocytes % 7.4 2.0 - 12.0 %    Eosinophils % 2.3 0.0 - 6.0 %    Basophils % 2.3 (H) 0.0 - 2.0 %    Neutrophils Absolute 4.56 1.80 - 7.30 E9/L    Immature Granulocytes # 0.01 E9/L    Lymphocytes Absolute 1.59 1.50 - 4.00 E9/L    Monocytes Absolute 0.52 0.10 - 0.95 E9/L    Eosinophils Absolute 0.16 0.05 - 0.50 E9/L    Basophils Absolute 0.16 0.00 - 0.20 E9/L   Lipase   Result Value Ref Range    Lipase 11 (L) 13 - 60 U/L   Comprehensive metabolic panel   Result Value Ref Range    Sodium 140 132 - 146 mmol/L    Potassium 3.5 3.5 - 5.0 mmol/L    Chloride 96 (L) 98 - 107 mmol/L    CO2 27 22 - 29 mmol/L    Anion Gap 17 (H) 7 - 16 mmol/L    Glucose 391 (H) 74 - 99 mg/dL    BUN 38 (H) 6 - 20 mg/dL    CREATININE 8.0 (H) 0.5 - 1.0 mg/dL    GFR Non-African American 7 >=60 mL/min/1.73    GFR African American 7     Calcium 9.1 8.6 - 10.2 mg/dL    Total Protein 6.1 (L) 6.4 - 8.3 g/dL    Albumin 3.4 (L) 3.5 - 5.2 g/dL    Total Bilirubin 0.2 0.0 - 1.2 Sodium 135 132 - 146 mmol/L    Potassium reflex Magnesium 3.8 3.5 - 5.0 mmol/L    Chloride 99 98 - 107 mmol/L    CO2 22 22 - 29 mmol/L    Anion Gap 14 7 - 16 mmol/L    Glucose 158 (H) 74 - 99 mg/dL    BUN 31 (H) 6 - 20 mg/dL    CREATININE 7.0 (H) 0.5 - 1.0 mg/dL    GFR Non-African American 8 >=60 mL/min/1.73    GFR African American 8     Calcium 8.7 8.6 - 10.2 mg/dL   CBC auto differential   Result Value Ref Range    WBC 5.5 4.5 - 11.5 E9/L    RBC 2.91 (L) 3.50 - 5.50 E12/L    Hemoglobin 9.1 (L) 11.5 - 15.5 g/dL    Hematocrit 27.3 (L) 34.0 - 48.0 %    MCV 93.8 80.0 - 99.9 fL    MCH 31.3 26.0 - 35.0 pg    MCHC 33.3 32.0 - 34.5 %    RDW 15.3 (H) 11.5 - 15.0 fL    Platelets 198 282 - 084 E9/L    MPV 9.6 7.0 - 12.0 fL    Neutrophils % 58.3 43.0 - 80.0 %    Immature Granulocytes % 0.4 0.0 - 5.0 %    Lymphocytes % 30.2 20.0 - 42.0 %    Monocytes % 4.9 2.0 - 12.0 %    Eosinophils % 4.6 0.0 - 6.0 %    Basophils % 1.6 0.0 - 2.0 %    Neutrophils Absolute 3.18 1.80 - 7.30 E9/L    Immature Granulocytes # 0.02 E9/L    Lymphocytes Absolute 1.65 1.50 - 4.00 E9/L    Monocytes Absolute 0.27 0.10 - 0.95 E9/L    Eosinophils Absolute 0.25 0.05 - 0.50 E9/L    Basophils Absolute 0.09 0.00 - 0.20 E9/L   Hemoglobin A1C   Result Value Ref Range    Hemoglobin A1C 8.8 (H) 4.0 - 5.6 %   Phosphorus   Result Value Ref Range    Phosphorus 4.3 2.5 - 4.5 mg/dL   CBC auto differential   Result Value Ref Range    WBC 5.4 4.5 - 11.5 E9/L    RBC 2.64 (L) 3.50 - 5.50 E12/L    Hemoglobin 8.4 (L) 11.5 - 15.5 g/dL    Hematocrit 25.2 (L) 34.0 - 48.0 %    MCV 95.5 80.0 - 99.9 fL    MCH 31.8 26.0 - 35.0 pg    MCHC 33.3 32.0 - 34.5 %    RDW 15.5 (H) 11.5 - 15.0 fL    Platelets 264 436 - 744 E9/L    MPV 10.1 7.0 - 12.0 fL    Neutrophils % 54.1 43.0 - 80.0 %    Immature Granulocytes % 0.4 0.0 - 5.0 %    Lymphocytes % 36.1 20.0 - 42.0 %    Monocytes % 4.6 2.0 - 12.0 %    Eosinophils % 3.7 0.0 - 6.0 %    Basophils % 1.1 0.0 - 2.0 %    Neutrophils Absolute 2.94 1.80 - 7.30 E9/L    Immature Granulocytes # 0.02 E9/L    Lymphocytes Absolute 1.96 1.50 - 4.00 E9/L    Monocytes Absolute 0.25 0.10 - 0.95 E9/L    Eosinophils Absolute 0.20 0.05 - 0.50 E9/L    Basophils Absolute 0.06 0.00 - 0.20 E9/L   Phosphorus   Result Value Ref Range    Phosphorus 4.6 (H) 2.5 - 4.5 mg/dL   Magnesium   Result Value Ref Range    Magnesium 1.8 1.6 - 2.6 mg/dL   Basic Metabolic Panel   Result Value Ref Range    Sodium 139 132 - 146 mmol/L    Potassium 3.5 3.5 - 5.0 mmol/L    Chloride 100 98 - 107 mmol/L    CO2 24 22 - 29 mmol/L    Anion Gap 15 7 - 16 mmol/L    Glucose 186 (H) 74 - 99 mg/dL    BUN 33 (H) 6 - 20 mg/dL    CREATININE 7.4 (H) 0.5 - 1.0 mg/dL    GFR Non-African American 8 >=60 mL/min/1.73    GFR African American 8     Calcium 8.7 8.6 - 10.2 mg/dL   TSH without Reflex   Result Value Ref Range    TSH 11.530 (H) 0.270 - 4.200 uIU/mL   Vitamin B12 & folate   Result Value Ref Range    Vitamin B-12 1099 (H) 211 - 946 pg/mL    Folate 10.7 4.8 - 24.2 ng/mL   Vitamin D 25 Hydroxy   Result Value Ref Range    Vit D, 25-Hydroxy <5 (L) 30 - 100 ng/mL   Iron and TIBC   Result Value Ref Range    Iron 69 37 - 145 mcg/dL    TIBC 102 (L) 250 - 450 mcg/dL    Iron Saturation 68 (H) 15 - 50 %   Beta-Hydroxybutyrate   Result Value Ref Range    Beta-Hydroxybutyrate 0.09 0.02 - 0.27 mmol/L   Basic Metabolic Panel w/ Reflex to MG   Result Value Ref Range    Sodium 137 132 - 146 mmol/L    Potassium reflex Magnesium 3.9 3.5 - 5.0 mmol/L    Chloride 100 98 - 107 mmol/L    CO2 24 22 - 29 mmol/L    Anion Gap 13 7 - 16 mmol/L    Glucose 198 (H) 74 - 99 mg/dL    BUN 28 (H) 6 - 20 mg/dL    CREATININE 6.8 (H) 0.5 - 1.0 mg/dL    GFR Non-African American 9 >=60 mL/min/1.73    GFR African American 9     Calcium 8.8 8.6 - 10.2 mg/dL   POCT Glucose   Result Value Ref Range    Meter Glucose 358 (H) 74 - 99 mg/dL   POCT Glucose   Result Value Ref Range    Meter Glucose 369 (H) 74 - 99 mg/dL   POCT Glucose   Result Value Ref Range    Meter Glucose 479 (H) 74 - 99 mg/dL   POCT Glucose   Result Value Ref Range    Meter Glucose >500 (H) 74 - 99 mg/dL   POCT Glucose   Result Value Ref Range    Meter Glucose >500 (H) 74 - 99 mg/dL   POCT Glucose   Result Value Ref Range    Meter Glucose 387 (H) 74 - 99 mg/dL   POCT Glucose   Result Value Ref Range    Meter Glucose 167 (H) 74 - 99 mg/dL   POCT Glucose   Result Value Ref Range    Meter Glucose 114 (H) 74 - 99 mg/dL   POCT Glucose   Result Value Ref Range    Meter Glucose 79 74 - 99 mg/dL   POCT Glucose   Result Value Ref Range    Meter Glucose 83 74 - 99 mg/dL   POCT Glucose   Result Value Ref Range    Meter Glucose 104 (H) 74 - 99 mg/dL   POCT Glucose   Result Value Ref Range    Meter Glucose 135 (H) 74 - 99 mg/dL   POCT Glucose   Result Value Ref Range    Meter Glucose 162 (H) 74 - 99 mg/dL   POCT Glucose   Result Value Ref Range    Meter Glucose 146 (H) 74 - 99 mg/dL   POCT Glucose   Result Value Ref Range    Meter Glucose 120 (H) 74 - 99 mg/dL   POCT Glucose   Result Value Ref Range    Meter Glucose 113 (H) 74 - 99 mg/dL   POCT Glucose   Result Value Ref Range    Meter Glucose 164 (H) 74 - 99 mg/dL   POCT Glucose   Result Value Ref Range    Meter Glucose 160 (H) 74 - 99 mg/dL   POCT Glucose   Result Value Ref Range    Meter Glucose 118 (H) 74 - 99 mg/dL   POCT Glucose   Result Value Ref Range    Meter Glucose 144 (H) 74 - 99 mg/dL   POCT Glucose   Result Value Ref Range    Meter Glucose 90 74 - 99 mg/dL   POCT Glucose   Result Value Ref Range    Meter Glucose 166 (H) 74 - 99 mg/dL   POCT Glucose   Result Value Ref Range    Meter Glucose <40 (L) 74 - 99 mg/dL   POCT Glucose   Result Value Ref Range    Meter Glucose 167 (H) 74 - 99 mg/dL   POCT Glucose   Result Value Ref Range    Meter Glucose 224 (H) 74 - 99 mg/dL   POCT Glucose   Result Value Ref Range    Meter Glucose 238 (H) 74 - 99 mg/dL   POCT Glucose   Result Value Ref Range    Meter Glucose 204 (H) 74 - 99 mg/dL   POCT Glucose Result Value Ref Range    Meter Glucose 289 (H) 74 - 99 mg/dL   POCT Glucose   Result Value Ref Range    Meter Glucose 199 (H) 74 - 99 mg/dL   POCT Glucose   Result Value Ref Range    Meter Glucose 244 (H) 74 - 99 mg/dL   EKG 12 Lead   Result Value Ref Range    Ventricular Rate 106 BPM    Atrial Rate 106 BPM    P-R Interval 132 ms    QRS Duration 86 ms    Q-T Interval 382 ms    QTc Calculation (Bazett) 507 ms    P Axis 54 degrees    R Axis 42 degrees    T Axis 68 degrees     Imaging: All Radiology results interpreted by Radiologist unless otherwise noted. NM GASTRIC EMPTYING   Final Result   Severely delayed gastric emptying. XR CHEST PORTABLE   Final Result   No evidence of active cardiopulmonary pathology. CT ABDOMEN PELVIS WO CONTRAST Additional Contrast? None   Final Result   No evidence of acute abdominal or pelvic pathology. Peritoneal fluid likely related to peritoneal dialysis with dialysis catheter   in the right side of the pelvis. Small hiatal hernia. Large amount of stool in the colon which may reflect constipation. EKG:  This EKG is signed and interpreted by ED Physician. Time:  2104   Rate: 106  Rhythm: Sinus. Interpretation: non-specific EKG.    ---------------------------- NURSING NOTES AND VITALS REVIEWED -------------------------   The nursing notes within the ED encounter and vital signs as below have been reviewed.    BP (!) 153/55   Pulse 116   Temp 99 °F (37.2 °C) (Oral)   Resp 12   Ht 5' 4\" (1.626 m)   Wt 156 lb (70.8 kg)   SpO2 99%   BMI 26.78 kg/m²   Oxygen Saturation Interpretation: Normal      ------------------------------------------PROGRESS NOTES -------------------------------------------    ED COURSE MEDICATIONS:                Medications   ARIPiprazole (ABILIFY) tablet 5 mg (5 mg Oral Given 7/13/21 2124)   insulin glargine (LANTUS) injection vial 7 Units (7 Units Subcutaneous Not Given 7/13/21 1106)   levothyroxine (SYNTHROID) tablet 75 mcg (75 mcg Oral Given 7/13/21 1336)   linezolid (ZYVOX) tablet 600 mg (600 mg Oral Given 7/13/21 2124)   mirtazapine (REMERON) tablet 15 mg (15 mg Oral Given 7/13/21 2125)   rOPINIRole (REQUIP) tablet 0.25 mg (0.25 mg Oral Given 7/13/21 2124)   sevelamer (RENVELA) tablet 800 mg (800 mg Oral Given 7/13/21 1758)   glucose (GLUTOSE) 40 % oral gel 15 g (has no administration in time range)   glucagon (rDNA) injection 1 mg (has no administration in time range)   dextrose 5 % solution (has no administration in time range)   sodium chloride flush 0.9 % injection 10 mL (10 mLs Intravenous Given 7/13/21 2124)   sodium chloride flush 0.9 % injection 10 mL (has no administration in time range)   0.9 % sodium chloride infusion (has no administration in time range)   polyethylene glycol (GLYCOLAX) packet 17 g (has no administration in time range)   acetaminophen (TYLENOL) tablet 650 mg (650 mg Oral Given 7/12/21 1740)     Or   acetaminophen (TYLENOL) suppository 650 mg ( Rectal See Alternative 7/12/21 1740)   prochlorperazine (COMPAZINE) injection 10 mg (10 mg Intravenous Given 7/11/21 1037)   heparin (porcine) injection 5,000 Units (5,000 Units Subcutaneous Not Given 7/13/21 2219)   insulin lispro (HUMALOG) injection vial 0-6 Units (1 Units Subcutaneous Given 7/13/21 1802)   0.9 % sodium chloride infusion (0 mL/hr Intravenous Stopped 7/10/21 0847)   hydrALAZINE (APRESOLINE) injection 20 mg (20 mg Intravenous Given 7/12/21 2015)   fentaNYL (SUBLIMAZE) injection 12.5 mcg (12.5 mcg Intravenous Given 7/14/21 0050)   dextrose 50 % IV solution (12.5 g Intravenous Given 7/12/21 1547)   dextrose 5 % and 0.45 % sodium chloride infusion ( Intravenous New Bag 7/10/21 1441)   insulin regular (HUMULIN R;NOVOLIN R) 100 Units in sodium chloride 0.9 % 100 mL infusion ( Intravenous Held by provider 7/10/21 2011)   potassium chloride 10 mEq/100 mL IVPB (Peripheral Line) (has no administration in time range)   magnesium sulfate 2000 mg in 50 mL IVPB premix (has no administration in time range)   sodium phosphate 10 mmol in dextrose 5 % 250 mL IVPB (has no administration in time range)   epoetin nena-epbx (RETACRIT) injection 3,000 Units (3,000 Units Subcutaneous Given 7/12/21 0905)   dextrose 10 % infusion ( Intravenous Held by provider 7/10/21 2003)   insulin lispro (HUMALOG) injection vial 0-3 Units (1 Units Subcutaneous Incomplete 7/13/21 2125)   calcium carbonate (TUMS) chewable tablet 500 mg (500 mg Oral Given 7/12/21 0909)   hydrALAZINE (APRESOLINE) tablet 50 mg (50 mg Oral Given 7/13/21 2124)   carvedilol (COREG) tablet 6.25 mg (6.25 mg Oral Given 7/13/21 1758)   pantoprazole (PROTONIX) injection 40 mg (40 mg Intravenous Given 7/13/21 2124)     And   sodium chloride (PF) 0.9 % injection 10 mL (10 mLs Intravenous Given 7/13/21 2124)   sucralfate (CARAFATE) tablet 1 g (1 g Oral Given 7/14/21 0016)   polyethylene glycol (GLYCOLAX) packet 17 g (17 g Oral Not Given 7/13/21 2124)   loperamide (IMODIUM) capsule 4 mg (4 mg Oral Given 7/13/21 2125)   cefdinir (OMNICEF) capsule 300 mg (300 mg Oral Given 7/13/21 1400)   0.9 % sodium chloride bolus (0 mLs Intravenous Stopped 7/9/21 2255)   0.9 % sodium chloride bolus (0 mLs Intravenous Stopped 7/10/21 0058)   aluminum & magnesium hydroxide-simethicone (MAALOX) 30 mL, lidocaine viscous hcl (XYLOCAINE) 5 mL (GI COCKTAIL) ( Oral Given 7/9/21 2251)   famotidine (PEPCID) injection 20 mg (20 mg Intravenous Given 7/9/21 2253)   metoclopramide (REGLAN) injection 10 mg (10 mg Intravenous Given 7/10/21 0058)   potassium chloride (KLOR-CON M) extended release tablet 20 mEq (20 mEq Oral Given 7/10/21 2037)   potassium bicarb-citric acid (EFFER-K) effervescent tablet 40 mEq (40 mEq Oral Given 7/11/21 0149)   metoprolol tartrate (LOPRESSOR) tablet 25 mg (25 mg Oral Given 7/11/21 1848)   hydrALAZINE (APRESOLINE) tablet 25 mg (25 mg Oral Given 7/11/21 1848)   potassium chloride (KLOR-CON M) extended release tablet 20 mEq (20 mEq Oral Given 7/12/21 0904)   technetium sulfur colloid (NYCOMED-SC) solution 2 millicurie (2 millicuries Oral Given 5/87/31 2290)   fentaNYL (SUBLIMAZE) injection 25 mcg (25 mcg Intravenous Given 7/13/21 1328)   metoprolol (LOPRESSOR) injection 5 mg (5 mg Intravenous Given 7/13/21 1632)         COUNSELING:   I have spoken with the patient and discussed todays results, in addition to providing specific details for the plan of care and counseling regarding the diagnosis and prognosis.     --------------------------------------- IMPRESSION & DISPOSITION --------------------------------     IMPRESSION(s):  1. Lactic acidosis    2. Nausea and vomiting, intractability of vomiting not specified, unspecified vomiting type    3. Hyperglycemia    4. Chronic kidney disease, unspecified CKD stage        This patient's ED course included: a personal history and physicial examination, multiple bedside re-evaluations and complex medical decision making and emergency management    This patient has remained hemodynamically stable and been closely monitored during their ED course. DISPOSITION:  Disposition: Admit to med/surg floor. Patient condition is stable. END OF PROVIDER NOTE.           Logan Gaytan DO  07/14/21 [id number]

## 2021-07-10 NOTE — PROGRESS NOTES
[address] [place], [place] [address]   Progress Note    Admitting Date and Time: 7/9/2021  7:55 PM  Admit Dx: Lactic acid acidosis [E87.2]    Subjective:    Pt awake and alert. Patient  Was lying in bed with no complaints verbalized. Patient seemed somewhat  Not herself. Patient denied any diarrhea, no loose stool or nausea since her admission. ROS: denies fever, chills, cp, sob, n/v, HA unless stated above.      ARIPiprazole  5 mg Oral Nightly    insulin glargine  7 Units Subcutaneous Daily    levothyroxine  75 mcg Oral Daily    metoprolol tartrate  25 mg Oral Daily    linezolid  600 mg Oral 2 times per day    mirtazapine  15 mg Oral Nightly    pantoprazole  40 mg Oral QAM AC    rOPINIRole  0.25 mg Oral Nightly    sevelamer  800 mg Oral TID WC    sodium chloride flush  10 mL Intravenous 2 times per day    heparin (porcine)  5,000 Units Subcutaneous 3 times per day    insulin lispro  0-6 Units Subcutaneous TID WC    insulin lispro  0-3 Units Subcutaneous Nightly    hydrALAZINE  25 mg Oral 3 times per day    famotidine (PEPCID) injection  10 mg Intravenous BID     glucose, 15 g, PRN  glucagon (rDNA), 1 mg, PRN  dextrose, 100 mL/hr, PRN  sodium chloride flush, 10 mL, PRN  sodium chloride, 25 mL, PRN  polyethylene glycol, 17 g, Daily PRN  acetaminophen, 650 mg, Q6H PRN   Or  acetaminophen, 650 mg, Q6H PRN  prochlorperazine, 10 mg, Q6H PRN  hydrALAZINE, 20 mg, Q6H PRN  fentanNYL, 12.5 mcg, Q2H PRN  dextrose, 12.5 g, PRN  potassium chloride, 10 mEq, PRN  magnesium sulfate, 1,000 mg, PRN  sodium phosphate IVPB, 10 mmol, PRN   Or  sodium phosphate IVPB, 15 mmol, PRN   Or  sodium phosphate IVPB, 20 mmol, PRN  dextrose 5 % and 0.45 % NaCl, , Continuous PRN         Objective:    BP (!) 162/108   Pulse 99   Temp 97.8 °F (36.6 °C) (Oral)   Resp 12   Ht 5' 4\" (1.626 m)   Wt 138 lb (62.6 kg)   SpO2 99%   BMI 23.69 kg/m²   General Appearance: alert and oriented to person, place and time and in no diabetic ketoacidosis without coma - uncontrolled - BG >500  and lactic acid is elevated - IVF's - transferred to ICU to be placed on insulin drip - beta hydroxy elevated -  close glucose monitoring     2. Lactic acid - 2.4 - will obtain serial lactic acid levels    3. Hypertension - uncontrolled - despite resuming home dose antihypertensive agents     4. H/o endocarditis  - on Zyvox     5. Urinary tract infection - on oral antibiotic     6. Diarrhea - stool for C diff pending - contact isolation     7. End-stage renal disease - on peritoneal dialysis - nephrology consulted - exchanges will be ordered by renal     8. Hypothyroidism - Levothyroxine     9. Hyperlipidemia - diet controlled     10. Iron deficiency anemia - was on Folic acid     11. Major depressive disorder - Abilify and Remeron continued     NOTE: This report was transcribed using voice recognition software. Every effort was made to ensure accuracy; however, inadvertent computerized transcription errors may be present.      Electronically signed by HEBER Jarquni CNP on 7/10/2021 at 1:43 PM

## 2021-07-10 NOTE — CONSULTS
Assessment and Plan  Patient is a 36 y.o. female with the following medical Problems:   1. Type 1 diabetes without coma with DKA  2.  End-stage renal disease on peritoneal dialysis  3. 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 Hypertension  5. History of substance abuse  6. Hypothyroidism  7. Dyslipidemia  8. Anemia of chronic kidney disease and iron deficiency anemia  9. Major depressive disorder  10. Mild lactic acidosis  11. History of diarrhea between (to rule out C. difficile)    Plan of care:  1. Patient will be initiated on insulin drip as per DKA protocol with IV fluid  2. Peritoneal dialysis as scheduled with nephrology follow-up  3. DVT prophylaxis  4. Reconcile home medication  5. Follow-up urine culture results and C. difficile results  6. PT OT  7. Renal diet once the patient is off IV insulin  8. Hold subcutaneous insulin. History of Present Illness:   Patient is a 79-year-old woman with ESRD on peritoneal dialysis, hypertension, hypothyroidism, prior substance abuse, diabetes mellitus type 1. Recently admitted to the hospital with UTI: Found to have VRE and was discharged on linezolid. Had diarrhea and C. difficile was ordered but canceled as she did not have any loose bowels. However she was under the impression that she did have C. difficile and was getting treatment for it. She continued to take the prescribed linezolid. Her diarrhea stopped while she was in the hospital.    She presented with  watery bowel movements, some sore throat, nasal congestion and nausea with continued vomiting. This morning patient was in mild DKA transferred to the ICU for initiation of IV insulin.       Past Medical History:  Past Medical History:   Diagnosis Date    Acute congestive heart failure ([address]     BC (acute kidney injury) ([address] 10/1/2019    Cardiac arrest ([address] 2/15/2021    Cephalgia 10/9/2019    Chronic kidney disease     Depression     Diabetes mellitus ([address]     Diabetic gastroparesis associated with type 1 diabetes mellitus [facility] 2018    Diabetic ketoacidosis ([address] 2011    Diabetic ketoacidosis with coma associated with type 1 diabetes mellitus ([address] 2013    Diabetic polyneuropathy associated with type 1 diabetes mellitus ([address] 2020    Drug use complicating pregnancy in third trimester     Endocarditis 10/31/2020    ESRD (end stage renal disease) ([address] 2020    Hemodialysis patient ([address]     History of blood transfusion 2019    Hyperlipidemia 10/8/2020    Hyperosmolar hyperglycemic state (HHS) ([facility] 2020    Hypothyroidism 10/8/2020    Iron deficiency anemia 10/1/2019    MDRO (multiple drug resistant organisms) resistance     MRSA (methicillin resistant Staphylococcus aureus)     back wound abcess    Non compliance w medication regimen 3/30/2016    Other disorders of kidney and ureter     Pregnancy 3/30/2016    16 weeks    Previous  delivery affecting pregnancy, antepartum 3/2/2017    Previous stillbirth or demise, antepartum 2016    Seizure ([facility] 2020    Severe pre-eclampsia in third trimester 2016    Shock liver 2/15/2021    Valvular endocarditis 11/10/2020    This Diagnosis was added to the Problem List based on transcribed orders from Dr. Rik Catalan           Family History:   Family History   Problem Relation Age of Onset    Asthma Mother     Hypertension Mother     High Blood Pressure Mother     Diabetes Mother     Asthma Brother     High Blood Pressure Father        Allergies:         Cefepime and Toradol [ketorolac tromethamine]    Social history:  Social History     Socioeconomic History    Marital status: Single     Spouse name: Not on file    Number of children: 2    Years of education: Not on file    Highest education level: Not on file   Occupational History    Not on file   Tobacco Use    Smoking status: Former Smoker     Packs/day: 0.50     Years: 6.00     Pack years: 3.00     Types: Cigarettes     Quit date: 2021     Years since quittin.4    Smokeless tobacco: Never Used    Tobacco comment: vaper cig   Vaping Use    Vaping Use: Never used   Substance and Sexual Activity    Alcohol use: No    Drug use: No     Comment: documented prior history of opioid abuse    Sexual activity: Yes     Partners: Male   Other Topics Concern    Not on file   Social History Narrative    Not on file     Social Determinants of Health     Financial Resource Strain: High Risk    Difficulty of Paying Living Expenses: Very hard   Food Insecurity: No Food Insecurity    Worried About Running Out of Food in the Last Year: Never true    [place] of Food in the Last Year: Never true   Transportation Needs: No Transportation Needs    Lack of Transportation (Medical): No    Lack of Transportation (Non-Medical):  No   Physical Activity:     Days of Exercise per Week:     Minutes of Exercise per Session:    Stress:     Feeling of Stress :    Social Connections:     Frequency of Communication with Friends and Family:     Frequency of Social Gatherings with Friends and Family:     Attends Shinto Services:     Active Member of Clubs or Organizations:     Attends Club or Organization Meetings:     Marital Status:    Intimate Partner Violence:     Fear of Current or Ex-Partner:     Emotionally Abused:     Physically Abused:     Sexually Abused:        Current Medications:     Current Facility-Administered Medications:     ARIPiprazole (ABILIFY) tablet 5 mg, 5 mg, Oral, Nightly, Jl Gonzalez MD    insulin glargine (LANTUS) injection vial 7 Units, 7 Units, Subcutaneous, Daily, Jl Gonzalez MD, 7 Units at 07/10/21 0843    levothyroxine (SYNTHROID) tablet 75 mcg, 75 mcg, Oral, Daily, Jl Gonzalez MD, 75 mcg at 07/10/21 0641    metoprolol tartrate (LOPRESSOR) tablet 25 mg, 25 mg, Oral, Daily, Jl Gonzalez MD, 25 mg at 07/10/21 0839    linezolid (ZYVOX) tablet 600 mg, 600 (SUBLIMAZE) injection 12.5 mcg, 12.5 mcg, Intravenous, Q2H PRN, Florinda Brands, DO    famotidine (PEPCID) injection 10 mg, 10 mg, Intravenous, BID, Evin Lopez DO, 10 mg at 07/10/21 1155    dextrose 50 % IV solution, 12.5 g, Intravenous, PRN, Verba Plate Raspovic, DO    0.9 % sodium chloride infusion, , Intravenous, Continuous, Evin Luopooliver, DO, Last Rate: 250 mL/hr at 07/10/21 1318, New Bag at 07/10/21 1318    dextrose 5 % and 0.45 % sodium chloride infusion, , Intravenous, Continuous PRN, Verba Plate Raspovic, DO    insulin regular (HUMULIN R;NOVOLIN R) 100 Units in sodium chloride 0.9 % 100 mL infusion, 0.1 Units/kg/hr, Intravenous, Continuous, Evin Lopez, DO    potassium chloride 10 mEq/100 mL IVPB (Peripheral Line), 10 mEq, Intravenous, PRN, Joey Grossman MD    magnesium sulfate 2000 mg in 50 mL IVPB premix, 2,000 mg, Intravenous, PRN, Joey Grossman MD    sodium phosphate 10 mmol in dextrose 5 % 250 mL IVPB, 10 mmol, Intravenous, PRN, Joey Grossman MD    Review of Systems:   General: denies weight gain, denies loss of appetite, fever, chills, night sweats. HEENT: denies headaches, dizziness, head trauma, visual changes, eye pain, tinnitus, nosebleeds, hoarseness or throat pain    Respiratory: denies chest pain, dyspnea, cough and hemoptysis  Cardiovascular: denies orthopnea, paroxysmal nocturnal dyspnea, leg swelling, and previous heart attack. Gastrointestinal: denies pain, nausea vomiting, diarrhea, constipation, melena or bleeding.   Genitourinary: denies hematuria, frequency, urgency or dysuria  Neurology: denies syncope, seizures, paralysis, paraesthesia   Endocrine: denies polyuria, polydipsia, skin or hair changes, and heat or cold intolerance  Musculoskeletal: denies joint pain, swelling, arthritis or myalgia  Hematologic: denies bleeding, adenopathy and easy bruising  Skin: denies rashes and skin discoloration  Psychiatry: denies depression    Physical Exam:   Vital Signs:  BP (!) 174/114   Pulse 97   Temp 97.8 °F (36.6 °C) (Oral)   Resp (!) 7   Ht 5' 4\" (1.626 m)   Wt 138 lb (62.6 kg)   SpO2 98%   BMI 23.69 kg/m²     Input/Output: In: 360 [I.V.:360]  Out: -     Oxygen requirements: RA    Ventilator Information:  Vent Information  SpO2: 98 %    General appearance:  not in pain or distress, in no respiratory distress    HEENT: Atraumatic/normocephalic, EOMI, MELI, pharynx clear, moist mucosa, redness of the uvula appreciated,   Neck: Supple, no jugular venous distension, lymphadenopathy, thyromegaly or carotid bruits  Chest: Equal normal breath sounds, no wheezing, no crackles and no tenderness over ribs   Cardiovascular: Normal S1 , S2, regular rate and rhythm, no murmur, rub or gallop  Abdomen: Normal sounds present, soft, lax with no tenderness, no hepatosplenomegaly, and no masses , +ve PD yissel  hter Extremities: No edema. Pulses are equally present. Skin: intact, no rashes   Neurologic: Alert and oriented x 3, No focal deficit     Investigations:  Labs, radiological imaging and cardiac work up were reviewed        ICU STAFF PHYSICIAN NOTE OF PERSONAL INVOLVEMENT IN CARE  As the attending physician, I certify that I personally reviewed the patient's history and personally examined the patient to confirm the physical findings described above, and that I reviewed the relevant imaging studies and available reports. I also discussed the differential diagnosis and all of the proposed management plans with the patient and individuals accompanying the patient to this visit. They had the opportunity to ask questions about the proposed management plans and to have those questions answered. This patient has a high probability of sudden, clinically significant deterioration, which requires the highest level of physician preparedness to intervene urgently. I managed/supervised life or organ supporting interventions that required frequent physician assessment.   I devoted my full attention to the direct care of this patient for the amount of time indicated below. Time I spent with family or surrogate(s) is included only if the patient was incapable of providing the necessary information or participating in medical decision-making. Time devoted to teaching and to any procedures I billed separately is not included.   Critical Care Time: 35 minutes      Electronically signed by Katerin Dunham MD on 7/10/2021 at 2:40 PM

## 2021-07-10 NOTE — CARE COORDINATION
SS NOTE: COVID NEGATIVE 7/10. Pt resides with two children with her mother in one floor apartment, pt's mother is cares for pt's children during hospitalizations. Pt was I pta with adls and mother assists with iadls, pt's mother transports. Pt has dme at home: [facility], shower chair and bsc. Pt has a hx of c and infusion with [facility] in the recent past. She also has a hx with [facility] 2/2021. Pt is I with peritoneal dialysis at home and cycles at night/ supplier is [facility]. PCP: Dr. Jimbo Mckay. Pt has rx coverage, uses [facility]. Pt would like to return home at [facility]. SS to continue. HANNAH Laguerre.7/10/2021.12:32 PM

## 2021-07-10 NOTE — CONSULTS
disorders of kidney and ureter     Pregnancy 3/30/2016    16 weeks    Previous  delivery affecting pregnancy, antepartum 3/2/2017    Previous stillbirth or demise, antepartum 2016    Seizure ([address] 2020    Severe pre-eclampsia in third trimester 2016    Shock liver 2/15/2021    Valvular endocarditis 11/10/2020    This Diagnosis was added to the Problem List based on transcribed orders from Dr. Marty Orozco        Past Surgical History:        Procedure Laterality Date    BACK SURGERY      abscess   [address]      x2    CHOLECYSTECTOMY, LAPAROSCOPIC N/A 2019    CHOLECYSTECTOMY LAPAROSCOPIC performed by Nathalie Tinajero MD at [facility] N/A 2018    COLONOSCOPY WITH BIOPSY performed by Fadia Walls MD at [facility] N/A 2018    COLONOSCOPY WITH BIOPSY performed by Nikia Brown MD at [address]  2012    EF 57%    ECHO COMPL W DOP COLOR FLOW  6/10/2013         EMBOLECTOMY N/A 2020    [address] [place] [address], [id number] [place] [address], YULISSA -- REQS ROOM 3 performed by Samy Fernandes MD at [address] [place] [address] Left 2021    LEFT LEG INCISION AND DRAINAGE, DEBRIDEMENT, WOUND VAC APPLICATION performed by Enid Shearer DPM at [address] [place] [address] Left 2021    LEFT LEG DEBRIDEMENT BIOPSY POSS APPLICATION WOUND VAC performed by Enid Shearer DPM at [place] [address] N/A 2020    LAPAROSCOPIC INSERTION PERITONEAL DIALYSIS CATHETER performed by Fidel León MD at [place] [address] ESOPHAGOGASTRODUODENOSCOPY TRANSORAL DIAGNOSTIC N/A 2018    EGD ESOPHAGOGASTRODUODENOSCOPY performed by Fadia Walls MD at [id number] [place] [address] TRANSESOPHAGEAL ECHOCARDIOGRAM N/A 10/19/2020    TRANSESOPHAGEAL ECHOCARDIOGRAM WITH BUBBLE STUDY performed by Olman Mendoza MD at [facility] [address] [address]  2018    UPPER GASTROINTESTINAL ENDOSCOPY 12/18/2018    EGD BIOPSY performed by Prabhakar Garcia MD at [facility] N/A 10/11/2019    EGD ESOPHAGOGASTRODUODENOSCOPY performed by Rafita Brewer DO at [place] [address]     Current Medications:    Current Facility-Administered Medications: ARIPiprazole (ABILIFY) tablet 5 mg, 5 mg, Oral, Nightly  [Held by provider] insulin glargine (LANTUS) injection vial 7 Units, 7 Units, Subcutaneous, Daily  levothyroxine (SYNTHROID) tablet 75 mcg, 75 mcg, Oral, Daily  metoprolol tartrate (LOPRESSOR) tablet 25 mg, 25 mg, Oral, Daily  linezolid (ZYVOX) tablet 600 mg, 600 mg, Oral, 2 times per day  mirtazapine (REMERON) tablet 15 mg, 15 mg, Oral, Nightly  pantoprazole (PROTONIX) tablet 40 mg, 40 mg, Oral, QAM AC  rOPINIRole (REQUIP) tablet 0.25 mg, 0.25 mg, Oral, Nightly  sevelamer (RENVELA) tablet 800 mg, 800 mg, Oral, TID WC  glucose (GLUTOSE) 40 % oral gel 15 g, 15 g, Oral, PRN  glucagon (rDNA) injection 1 mg, 1 mg, Intramuscular, PRN  dextrose 5 % solution, 100 mL/hr, Intravenous, PRN  sodium chloride flush 0.9 % injection 10 mL, 10 mL, Intravenous, 2 times per day  sodium chloride flush 0.9 % injection 10 mL, 10 mL, Intravenous, PRN  0.9 % sodium chloride infusion, 25 mL, Intravenous, PRN  polyethylene glycol (GLYCOLAX) packet 17 g, 17 g, Oral, Daily PRN  acetaminophen (TYLENOL) tablet 650 mg, 650 mg, Oral, Q6H PRN **OR** acetaminophen (TYLENOL) suppository 650 mg, 650 mg, Rectal, Q6H PRN  prochlorperazine (COMPAZINE) injection 10 mg, 10 mg, Intravenous, Q6H PRN  heparin (porcine) injection 5,000 Units, 5,000 Units, Subcutaneous, 3 times per day  [Held by provider] insulin lispro (HUMALOG) injection vial 0-6 Units, 0-6 Units, Subcutaneous, TID WC  [Held by provider] insulin lispro (HUMALOG) injection vial 0-3 Units, 0-3 Units, Subcutaneous, Nightly  hydrALAZINE (APRESOLINE) tablet 25 mg, 25 mg, Oral, 3 times per day  hydrALAZINE (APRESOLINE) injection 20 mg, 20 mg, Intravenous, Q6H PRN  fentaNYL (SUBLIMAZE) injection 12.5 mcg, 12.5 mcg, Intravenous, Q2H PRN  famotidine (PEPCID) injection 10 mg, 10 mg, Intravenous, BID  dextrose 50 % IV solution, 12.5 g, Intravenous, PRN  0.9 % sodium chloride infusion, , Intravenous, Continuous  dextrose 5 % and 0.45 % sodium chloride infusion, , Intravenous, Continuous PRN  insulin regular (HUMULIN R;NOVOLIN R) 100 Units in sodium chloride 0.9 % 100 mL infusion, 0.1 Units/kg/hr, Intravenous, Continuous  potassium chloride 10 mEq/100 mL IVPB (Peripheral Line), 10 mEq, Intravenous, PRN  magnesium sulfate 2000 mg in 50 mL IVPB premix, 2,000 mg, Intravenous, PRN  sodium phosphate 10 mmol in dextrose 5 % 250 mL IVPB, 10 mmol, Intravenous, PRN  Allergies:  Cefepime and Toradol [ketorolac tromethamine]    Social History:    TOBACCO:  Never used tobacco  ETOH:  Never drank alcohol  DRUGS:  Never used recreational drugs    Family History:       Problem Relation Age of Onset    Asthma Mother     Hypertension Mother     High Blood Pressure Mother     Diabetes Mother     Asthma Brother     High Blood Pressure Father      REVIEW OF SYSTEMS:    CONSTITUTIONAL:  positive for  malaise  EYES:  negative  HEENT:  negative  RESPIRATORY:  negative  CARDIOVASCULAR:  negative  GASTROINTESTINAL:  positive for nausea and vomiting  PHYSICAL EXAM:      Vitals:    VITALS:  BP (!) 174/114   Pulse 97   Temp 97.8 °F (36.6 °C) (Oral)   Resp (!) 7   Ht 5' 4\" (1.626 m)   Wt 138 lb (62.6 kg)   SpO2 98%   BMI 23.69 kg/m²   24HR BLOOD PRESSURE RANGE:  Systolic (96PKC), FFD:034 , Min:112 , OHQ:782   ; Diastolic (08PAE), ORD:497, Min:73, Max:114    PD Catheter Exam:  PD catheter exit site clean    Constitutional: Awake in no acute distress  HEENT:  Normocephalic, PERRL  Respiratory: Decreased breath sounds on the basis  Cardiovascular/Edema:  RRR, S1/S2  Gastrointestinal:  Soft, PD catheter exit site clean   Neurologic:  Nonfocal, AVELAR  Skin:  Warm, dry, no lesions  Other:  no edema    DATA:    CBC:   Lab Results   Component Value Date    WBC 7.0 07/09/2021    RBC 3.41 07/09/2021    HGB 10.6 07/09/2021    HCT 32.0 07/09/2021    MCV 93.8 07/09/2021    MCH 31.1 07/09/2021    MCHC 33.1 07/09/2021    RDW 15.8 07/09/2021     07/09/2021    MPV 9.3 07/09/2021     CMP:    Lab Results   Component Value Date     07/10/2021    K 3.8 07/10/2021    K 4.0 06/22/2021    CL 99 07/10/2021    CO2 24 07/10/2021    BUN 41 07/10/2021    CREATININE 8.1 07/10/2021    GFRAA 7 07/10/2021    LABGLOM 7 07/10/2021    GLUCOSE 328 07/10/2021    GLUCOSE 130 05/18/2012    PROT 6.1 07/09/2021    LABALBU 3.4 07/09/2021    LABALBU 4.1 05/18/2012    CALCIUM 8.7 07/10/2021    BILITOT 0.2 07/09/2021    ALKPHOS 190 07/09/2021    AST 30 07/09/2021    ALT 22 07/09/2021     Radiology Review:      IMPRESSION/RECOMMENDATIONS:    Marci Collazo is a 53-year-old female with history of ESRD on Peritoneal Dialysis, poorly controlled type I DM with multiple admissions for DKA, gastroparesis, HTN, 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 In the emergency room she was found to have a glucose level of 391, beta hydroxybutyrate of 2.49. She was admitted to MICU. 1. ESRD on peritoneal dialysis, to continue CCPD 4 exchanges over 10 hours of 1.5% with long dwell of 2.5%  2. HTN, on hydralazine 25 mg three times daily and metoprolol 25 mg daily  3. Hx of UTI  4. MBD of CKD, on sevelamer  5.  Anemia of CKD,     Plan:     · CCPD 4 exchanges over 10 hours of 1.5% with long dwell of 2.5%  · Monitor electrolytes  · Check urine culture   · Agree with D5 1/2ns at 250 ml/hr with insulin drip . monitor pulmonary status  · Insulin drip as per ICU  · Epoetin alpha 3000 units 3 times a week     Discussed with RN and HD RN  Thank you very much

## 2021-07-10 NOTE — H&P
[facility] History and Physical      CHIEF COMPLAINT: Nausea vomiting diarrhea    History of Present Illness:  51-year-old female with a history of ESRD on peritoneal dialysis, hypertension, hypothyroidism, prior substance abuse, diabetes mellitus type 1. Recently admitted to the hospital with UTI: Found to have VRE and was discharged on linezolid. Had diarrhea and C. difficile was ordered but canceled as she did not have any loose bowels. However she is under the impression that she did have C. difficile and was getting treatment for it. Reports she continues to take the prescribed linezolid. Her diarrhea stopped while she was in the hospital.  Yesterday however she had 6 watery bowel movements, some sore throat, nasal congestion and nausea with continued vomiting. Therefore presented to ED for further evaluation. No specific abdominal pain but has generalized body aches. In the ED she received antiemetics but still had persistent vomiting therefore referred for admission.     Informant(s) for H&P: Patient    REVIEW OF SYSTEMS:  A comprehensive 14 point review of systems was negative except for: what is in the HPI    PMH:  Past Medical History:   Diagnosis Date    Acute congestive heart failure ([address]     BC (acute kidney injury) ([address] 10/1/2019    Cardiac arrest ([address] 2/15/2021    Cephalgia 10/9/2019    Chronic kidney disease     Depression     Diabetes mellitus ([address]     Diabetic gastroparesis associated with type 1 diabetes mellitus ([address] 12/17/2018    Diabetic ketoacidosis ([address] 8/27/2011    Diabetic ketoacidosis with coma associated with type 1 diabetes mellitus ([address] 6/26/2013    Diabetic polyneuropathy associated with type 1 diabetes mellitus ([address] 12/27/2020    Drug use complicating pregnancy in third trimester     Endocarditis 10/31/2020    ESRD (end stage renal disease) ([address] 9/29/2020    Hemodialysis patient ([address]     History of blood transfusion 11/2019    Hyperlipidemia 10/8/2020    Hyperosmolar hyperglycemic state (HHS) ([facility] 2020    Hypothyroidism 10/8/2020    Iron deficiency anemia 10/1/2019    MDRO (multiple drug resistant organisms) resistance     MRSA (methicillin resistant Staphylococcus aureus)     back wound abcess    Non compliance w medication regimen 3/30/2016    Other disorders of kidney and ureter     Pregnancy 3/30/2016    16 weeks    Previous  delivery affecting pregnancy, antepartum 3/2/2017    Previous stillbirth or demise, antepartum 2016    Seizure ([facility] 2020    Severe pre-eclampsia in third trimester 2016    Shock liver 2/15/2021    Valvular endocarditis 11/10/2020    This Diagnosis was added to the Problem List based on transcribed orders from Dr. Erma Angelo          Surgical History:  Past Surgical History:   Procedure Laterality Date    BACK SURGERY      abscess     SECTION      x2    CHOLECYSTECTOMY, LAPAROSCOPIC N/A 2019    CHOLECYSTECTOMY LAPAROSCOPIC performed by Anish Montesinos MD at [address] [place] [address] N/A 2018    COLONOSCOPY WITH BIOPSY performed by Ema Olmstead MD at [address] [place] [address] COLONOSCOPY N/A 2018    COLONOSCOPY WITH BIOPSY performed by Roberto Duque MD at [address] [place] [address] ECHO COMPL [address]  2012    EF 57%    ECHO COMPL W DOP COLOR FLOW  6/10/2013         EMBOLECTOMY N/A 2020    [address] [place] [address], [address] [place] [address], [facility] -- REQS ROOM 3 performed by Martha Manzanares MD at [address] [place] [address] Left 2021    LEFT LEG INCISION AND DRAINAGE, DEBRIDEMENT, WOUND VAC APPLICATION performed by Kathryn Gerber DPM at [address] [place] [address] Left 2021    LEFT LEG DEBRIDEMENT BIOPSY POSS APPLICATION WOUND VAC performed by Kathryn Gerber DPM at [place] [address] N/A 2020    LAPAROSCOPIC INSERTION PERITONEAL DIALYSIS CATHETER performed by Kahs Vences MD at [facility] ESOPHAGOGASTRODUODENOSCOPY TRANSORAL DIAGNOSTIC N/A 5/30/2018    EGD ESOPHAGOGASTRODUODENOSCOPY performed by James Garza MD at [address] [facility] TRANSESOPHAGEAL ECHOCARDIOGRAM N/A 10/19/2020    TRANSESOPHAGEAL ECHOCARDIOGRAM WITH BUBBLE STUDY performed by Jase Torres MD at [address] [facility] TUNNELED VENOUS PORT PLACEMENT  04/2018    UPPER GASTROINTESTINAL ENDOSCOPY  12/18/2018    EGD BIOPSY performed by Lakshmi Da Silva MD at [place] [address] N/A 10/11/2019    EGD ESOPHAGOGASTRODUODENOSCOPY performed by Yoni Hurd DO at [place] [address]       Medications Prior to Admission:    Prior to Admission medications    Medication Sig Start Date End Date Taking? Authorizing Provider   insulin glargine (LANTUS) 100 UNIT/ML injection vial Inject 7 Units into the skin daily New change in dose and frequency 7/3/21   Anushka Fried MD   sevelamer (RENVELA) 800 MG tablet Take 1 tablet by mouth 3 times daily (with meals) New lower dose 7/2/21   Anushka Fried MD   linezolid (ZYVOX) 600 MG tablet Take 1 tablet by mouth every 12 hours for 14 days 7/1/21 7/15/21  Luiz Lewis MD   insulin lispro, 1 Unit Dial, (HUMALOG KWIKPEN) 100 UNIT/ML SOPN Inject 3 units with meals + sliding scale. MAX 30U/day 6/1/21   Gumaro Gerber MD   Insulin Pen Needle (BD PEN NEEDLE NAV U/F) 32G X 4 MM MISC Uses with insulin 4 times a day 6/1/21   Gumaro Gerber MD   blood glucose monitor strips Freestyle Lite Strips. Checks 4 times/day before meals and at bedtime and as needed for symptoms of irregular blood glucose. 6/1/21   Zeke Benavides MD   mupirocin (BACTROBAN) 2 % ointment Apply 15 g topically daily Apply topically daily to left leg.     Historical Provider, MD   folic acid (FOLVITE) 1 MG tablet Take 1 tablet by mouth daily 5/19/21 6/18/21  Luiz Lewis MD   mirtazapine (REMERON) 15 MG tablet Take 15 mg by mouth nightly    Historical Provider, MD   hydrOXYzine (ATARAX) 25 MG tablet Take 25 mg by mouth daily    Historical Provider, MD   polyethylene glycol (GLYCOLAX) 17 g packet Take 17 g by mouth daily as needed for Constipation    Historical Provider, MD   albuterol (PROVENTIL) (2.5 MG/3ML) 0.083% nebulizer solution Take 2.5 mg by nebulization every 6 hours as needed for Wheezing    Historical Provider, MD   ARIPiprazole (ABILIFY) 5 MG tablet Take 5 mg by mouth nightly 4/18/21   Historical Provider, MD   docusate sodium (COLACE) 100 MG capsule Take 100 mg by mouth 2 times daily    Historical Provider, MD   Glucagon rDNA, (GLUCAGON EMERGENCY IJ) Inject as directed    Historical Provider, MD   glucose (GLUTOSE) 40 % GEL Take 15 g by mouth See Admin Instructions    Historical Provider, MD   hydrALAZINE (APRESOLINE) 10 MG tablet Take 1 tablet by mouth 2 times daily 4/19/21   Jacinda Littlejohn, DO   metoprolol tartrate (LOPRESSOR) 25 MG tablet Take 1 tablet by mouth daily 4/19/21   Jacinda Littlejohn, DO   pantoprazole (PROTONIX) 40 MG tablet Take 1 tablet by mouth every morning (before breakfast) 4/19/21   Jacinda Littlejohn, DO   levothyroxine (SYNTHROID) 75 MCG tablet TAKE 1 TABLET BY MOUTH IN THE MORNING BEFORE BREAKFAST 4/19/21   Jacinda Littlejohn, DO   epoetin nena-epbx (RETACRIT) 3000 UNIT/ML SOLN injection Inject 1 mL into the skin three times a week 3/1/21   Faviola Osei MD   rOPINIRole (REQUIP) 0.25 MG tablet Take 0.25 mg by mouth nightly    Historical Provider, MD   blood glucose test strips (CONTOUR NEXT TEST) strip 1 each by In Vitro route 5 times daily As needed. 12/14/20   Zeke Servin MD   metoclopramide (REGLAN) 5 MG tablet Take 5 mg by mouth 3 times daily     Historical Provider, MD       Allergies:    Cefepime and Toradol [ketorolac tromethamine]    Social History:    reports that she quit smoking about 5 months ago. Her smoking use included cigarettes. She has a 3.00 pack-year smoking history.  She has never used smokeless tobacco. She reports that she does not drink alcohol and does not use drugs. Family History:   family history includes Asthma in her brother and mother; Diabetes in her mother; High Blood Pressure in her father and mother; Hypertension in her mother. PHYSICAL EXAM:  Vitals:  BP (!) 168/107   Pulse 104   Temp 98 °F (36.7 °C)   Resp 20   Ht 5' 4\" (1.626 m)   Wt 138 lb (62.6 kg)   SpO2 96%   BMI 23.69 kg/m²   General Appearance: alert and oriented to person, place and time and in no acute distress  Skin: warm and dry, turgor not diminished  Head: normocephalic and atraumatic  Eyes: pupils equal, round, and reactive to light, extraocular eye movements intact, conjunctivae normal  Neck: neck supple and non tender without mass   Pulmonary/Chest: clear to auscultation bilaterally- no wheezes, rales or rhonchi, normal air movement, no respiratory distress  Cardiovascular: normal rate, normal S1 and S2 and no M/R/R  Abdomen: soft, non-tender, non-distended, normal bowel sounds, no masses or organomegaly. PD catheter present  Extremities: no cyanosis, no clubbing and no edema  Neurologic: no cranial nerve deficit and speech normal        LABS:  Recent Labs     07/09/21 2010      K 3.5   CL 96*   CO2 27   BUN 38*   CREATININE 8.0*   GLUCOSE 391*   CALCIUM 9.1       Recent Labs     07/09/21 2010   WBC 7.0   RBC 3.41*   HGB 10.6*   HCT 32.0*   MCV 93.8   MCH 31.1   MCHC 33.1   RDW 15.8*      MPV 9.3       No results for input(s): POCGLU in the last 72 hours. Radiology:   XR CHEST PORTABLE   Final Result   No evidence of active cardiopulmonary pathology. CT ABDOMEN PELVIS WO CONTRAST Additional Contrast? None   Final Result   No evidence of acute abdominal or pelvic pathology. Peritoneal fluid likely related to peritoneal dialysis with dialysis catheter   in the right side of the pelvis. Small hiatal hernia. Large amount of stool in the colon which may reflect constipation.              EKG: Pending    ASSESSMENT:    Nausea vomiting and diarrhea  Recurrent UTI: Currently on linezolid due to VRE UTI  Lactic acidosis  ESRD on PD  Poorly controlled type 1 diabetes mellitus  History of drug abuse  Prior endocarditis    PLAN:  Nausea vomiting and diarrhea:  -Symptomatic management  -Check respiratory panel, C. difficile if she has more a diarrhea  -Hold Reglan  -Trend lactic acid    Recurrent UTI: Continue linezolid to complete course. Prescribed 14 days 7/2    Diabetes mellitus type 1 uncontrolled: Basal/bolus with SSI    ESRD on PD: Reports no issues with PD yesterday. Consult nephrology to resume inpatient PD. Code Status: Full  DVT prophylaxis: Heparin      NOTE: This report was transcribed using voice recognition software. Every effort was made to ensure accuracy; however, inadvertent computerized transcription errors may be present.   Electronically signed by Iram Ag MD on 7/10/2021 at 1:10 AM

## 2021-07-11 LAB
ANION GAP SERPL CALCULATED.3IONS-SCNC: 12 MMOL/L (ref 7–16)
ANION GAP SERPL CALCULATED.3IONS-SCNC: 14 MMOL/L (ref 7–16)
BASOPHILS ABSOLUTE: 0.09 E9/L (ref 0–0.2)
BASOPHILS RELATIVE PERCENT: 1.6 % (ref 0–2)
BUN BLDV-MCNC: 31 MG/DL (ref 6–20)
BUN BLDV-MCNC: 36 MG/DL (ref 6–20)
CALCIUM SERPL-MCNC: 8.1 MG/DL (ref 8.6–10.2)
CALCIUM SERPL-MCNC: 8.7 MG/DL (ref 8.6–10.2)
CHLORIDE BLD-SCNC: 100 MMOL/L (ref 98–107)
CHLORIDE BLD-SCNC: 99 MMOL/L (ref 98–107)
CO2: 22 MMOL/L (ref 22–29)
CO2: 25 MMOL/L (ref 22–29)
CREAT SERPL-MCNC: 7 MG/DL (ref 0.5–1)
CREAT SERPL-MCNC: 7.3 MG/DL (ref 0.5–1)
EKG ATRIAL RATE: 106 BPM
EKG P AXIS: 54 DEGREES
EKG P-R INTERVAL: 132 MS
EKG Q-T INTERVAL: 382 MS
EKG QRS DURATION: 86 MS
EKG QTC CALCULATION (BAZETT): 507 MS
EKG R AXIS: 42 DEGREES
EKG T AXIS: 68 DEGREES
EKG VENTRICULAR RATE: 106 BPM
EOSINOPHILS ABSOLUTE: 0.25 E9/L (ref 0.05–0.5)
EOSINOPHILS RELATIVE PERCENT: 4.6 % (ref 0–6)
GFR AFRICAN AMERICAN: 8
GFR AFRICAN AMERICAN: 8
GFR NON-AFRICAN AMERICAN: 8 ML/MIN/1.73
GFR NON-AFRICAN AMERICAN: 8 ML/MIN/1.73
GLUCOSE BLD-MCNC: 116 MG/DL (ref 74–99)
GLUCOSE BLD-MCNC: 158 MG/DL (ref 74–99)
HBA1C MFR BLD: 8.8 % (ref 4–5.6)
HCT VFR BLD CALC: 27.3 % (ref 34–48)
HEMOGLOBIN: 9.1 G/DL (ref 11.5–15.5)
IMMATURE GRANULOCYTES #: 0.02 E9/L
IMMATURE GRANULOCYTES %: 0.4 % (ref 0–5)
LYMPHOCYTES ABSOLUTE: 1.65 E9/L (ref 1.5–4)
LYMPHOCYTES RELATIVE PERCENT: 30.2 % (ref 20–42)
MAGNESIUM: 2 MG/DL (ref 1.6–2.6)
MCH RBC QN AUTO: 31.3 PG (ref 26–35)
MCHC RBC AUTO-ENTMCNC: 33.3 % (ref 32–34.5)
MCV RBC AUTO: 93.8 FL (ref 80–99.9)
METER GLUCOSE: 118 MG/DL (ref 74–99)
METER GLUCOSE: 144 MG/DL (ref 74–99)
METER GLUCOSE: 160 MG/DL (ref 74–99)
METER GLUCOSE: 164 MG/DL (ref 74–99)
METER GLUCOSE: 90 MG/DL (ref 74–99)
MONOCYTES ABSOLUTE: 0.27 E9/L (ref 0.1–0.95)
MONOCYTES RELATIVE PERCENT: 4.9 % (ref 2–12)
NEUTROPHILS ABSOLUTE: 3.18 E9/L (ref 1.8–7.3)
NEUTROPHILS RELATIVE PERCENT: 58.3 % (ref 43–80)
PDW BLD-RTO: 15.3 FL (ref 11.5–15)
PHOSPHORUS: 4.1 MG/DL (ref 2.5–4.5)
PHOSPHORUS: 4.3 MG/DL (ref 2.5–4.5)
PLATELET # BLD: 233 E9/L (ref 130–450)
PMV BLD AUTO: 9.6 FL (ref 7–12)
POTASSIUM REFLEX MAGNESIUM: 3.8 MMOL/L (ref 3.5–5)
POTASSIUM SERPL-SCNC: 3.4 MMOL/L (ref 3.5–5)
RBC # BLD: 2.91 E12/L (ref 3.5–5.5)
SODIUM BLD-SCNC: 135 MMOL/L (ref 132–146)
SODIUM BLD-SCNC: 137 MMOL/L (ref 132–146)
WBC # BLD: 5.5 E9/L (ref 4.5–11.5)

## 2021-07-11 PROCEDURE — 6370000000 HC RX 637 (ALT 250 FOR IP): Performed by: INTERNAL MEDICINE

## 2021-07-11 PROCEDURE — 2500000003 HC RX 250 WO HCPCS: Performed by: INTERNAL MEDICINE

## 2021-07-11 PROCEDURE — 83036 HEMOGLOBIN GLYCOSYLATED A1C: CPT

## 2021-07-11 PROCEDURE — 82962 GLUCOSE BLOOD TEST: CPT

## 2021-07-11 PROCEDURE — 87186 SC STD MICRODIL/AGAR DIL: CPT

## 2021-07-11 PROCEDURE — 2580000003 HC RX 258: Performed by: INTERNAL MEDICINE

## 2021-07-11 PROCEDURE — 87077 CULTURE AEROBIC IDENTIFY: CPT

## 2021-07-11 PROCEDURE — 99232 SBSQ HOSP IP/OBS MODERATE 35: CPT | Performed by: INTERNAL MEDICINE

## 2021-07-11 PROCEDURE — 6360000002 HC RX W HCPCS: Performed by: INTERNAL MEDICINE

## 2021-07-11 PROCEDURE — APPSS30 APP SPLIT SHARED TIME 16-30 MINUTES: Performed by: NURSE PRACTITIONER

## 2021-07-11 PROCEDURE — 2000000000 HC ICU R&B

## 2021-07-11 PROCEDURE — 84100 ASSAY OF PHOSPHORUS: CPT

## 2021-07-11 PROCEDURE — 90945 DIALYSIS ONE EVALUATION: CPT

## 2021-07-11 PROCEDURE — 85025 COMPLETE CBC W/AUTO DIFF WBC: CPT

## 2021-07-11 PROCEDURE — 80048 BASIC METABOLIC PNL TOTAL CA: CPT

## 2021-07-11 PROCEDURE — 6370000000 HC RX 637 (ALT 250 FOR IP): Performed by: NURSE PRACTITIONER

## 2021-07-11 PROCEDURE — 87088 URINE BACTERIA CULTURE: CPT

## 2021-07-11 PROCEDURE — 36600 WITHDRAWAL OF ARTERIAL BLOOD: CPT

## 2021-07-11 RX ORDER — CARVEDILOL 6.25 MG/1
6.25 TABLET ORAL 2 TIMES DAILY WITH MEALS
Status: DISCONTINUED | OUTPATIENT
Start: 2021-07-12 | End: 2021-07-15 | Stop reason: HOSPADM

## 2021-07-11 RX ORDER — METOPROLOL TARTRATE 50 MG/1
50 TABLET, FILM COATED ORAL DAILY
Status: DISCONTINUED | OUTPATIENT
Start: 2021-07-12 | End: 2021-07-11

## 2021-07-11 RX ORDER — AMLODIPINE BESYLATE 5 MG/1
5 TABLET ORAL DAILY
Status: DISCONTINUED | OUTPATIENT
Start: 2021-07-11 | End: 2021-07-11

## 2021-07-11 RX ORDER — HYDRALAZINE HYDROCHLORIDE 50 MG/1
50 TABLET, FILM COATED ORAL EVERY 8 HOURS SCHEDULED
Status: DISCONTINUED | OUTPATIENT
Start: 2021-07-11 | End: 2021-07-15 | Stop reason: HOSPADM

## 2021-07-11 RX ORDER — HYDRALAZINE HYDROCHLORIDE 25 MG/1
25 TABLET, FILM COATED ORAL ONCE
Status: COMPLETED | OUTPATIENT
Start: 2021-07-11 | End: 2021-07-11

## 2021-07-11 RX ORDER — CALCIUM CARBONATE 200(500)MG
500 TABLET,CHEWABLE ORAL 3 TIMES DAILY PRN
Status: DISCONTINUED | OUTPATIENT
Start: 2021-07-11 | End: 2021-07-15 | Stop reason: HOSPADM

## 2021-07-11 RX ADMIN — ACETAMINOPHEN 650 MG: 325 TABLET ORAL at 20:38

## 2021-07-11 RX ADMIN — SEVELAMER CARBONATE 800 MG: 800 TABLET, FILM COATED ORAL at 14:26

## 2021-07-11 RX ADMIN — INSULIN GLARGINE 7 UNITS: 100 INJECTION, SOLUTION SUBCUTANEOUS at 10:00

## 2021-07-11 RX ADMIN — MIRTAZAPINE 15 MG: 15 TABLET, FILM COATED ORAL at 20:24

## 2021-07-11 RX ADMIN — FENTANYL CITRATE 12.5 MCG: 50 INJECTION INTRAMUSCULAR; INTRAVENOUS at 23:28

## 2021-07-11 RX ADMIN — HEPARIN SODIUM 5000 UNITS: 5000 INJECTION INTRAVENOUS; SUBCUTANEOUS at 20:25

## 2021-07-11 RX ADMIN — INSULIN LISPRO 1 UNITS: 100 INJECTION, SOLUTION INTRAVENOUS; SUBCUTANEOUS at 17:40

## 2021-07-11 RX ADMIN — INSULIN LISPRO 1 UNITS: 100 INJECTION, SOLUTION INTRAVENOUS; SUBCUTANEOUS at 10:00

## 2021-07-11 RX ADMIN — ARIPIPRAZOLE 5 MG: 5 TABLET ORAL at 20:23

## 2021-07-11 RX ADMIN — METOPROLOL TARTRATE 25 MG: 25 TABLET, FILM COATED ORAL at 18:48

## 2021-07-11 RX ADMIN — LINEZOLID 600 MG: 600 TABLET, FILM COATED ORAL at 20:23

## 2021-07-11 RX ADMIN — HYDRALAZINE HYDROCHLORIDE 25 MG: 25 TABLET, FILM COATED ORAL at 06:01

## 2021-07-11 RX ADMIN — FAMOTIDINE 10 MG: 10 INJECTION INTRAVENOUS at 20:25

## 2021-07-11 RX ADMIN — HEPARIN SODIUM 5000 UNITS: 5000 INJECTION INTRAVENOUS; SUBCUTANEOUS at 14:26

## 2021-07-11 RX ADMIN — CALCIUM CARBONATE (ANTACID) CHEW TAB 500 MG 500 MG: 500 CHEW TAB at 20:23

## 2021-07-11 RX ADMIN — HYDRALAZINE HYDROCHLORIDE 25 MG: 25 TABLET, FILM COATED ORAL at 18:48

## 2021-07-11 RX ADMIN — PROCHLORPERAZINE EDISYLATE 10 MG: 5 INJECTION INTRAMUSCULAR; INTRAVENOUS at 10:37

## 2021-07-11 RX ADMIN — ROPINIROLE HYDROCHLORIDE 0.25 MG: 0.25 TABLET, FILM COATED ORAL at 20:23

## 2021-07-11 RX ADMIN — METOPROLOL TARTRATE 25 MG: 25 TABLET, FILM COATED ORAL at 15:50

## 2021-07-11 RX ADMIN — FENTANYL CITRATE 12.5 MCG: 50 INJECTION INTRAMUSCULAR; INTRAVENOUS at 15:47

## 2021-07-11 RX ADMIN — SEVELAMER CARBONATE 800 MG: 800 TABLET, FILM COATED ORAL at 18:48

## 2021-07-11 RX ADMIN — FENTANYL CITRATE 12.5 MCG: 50 INJECTION INTRAMUSCULAR; INTRAVENOUS at 10:56

## 2021-07-11 RX ADMIN — ACETAMINOPHEN 650 MG: 325 TABLET ORAL at 02:05

## 2021-07-11 RX ADMIN — ACETAMINOPHEN 650 MG: 325 TABLET ORAL at 10:55

## 2021-07-11 RX ADMIN — HEPARIN SODIUM 5000 UNITS: 5000 INJECTION INTRAVENOUS; SUBCUTANEOUS at 05:53

## 2021-07-11 RX ADMIN — FAMOTIDINE 10 MG: 10 INJECTION INTRAVENOUS at 10:56

## 2021-07-11 RX ADMIN — FENTANYL CITRATE 12.5 MCG: 50 INJECTION INTRAMUSCULAR; INTRAVENOUS at 19:40

## 2021-07-11 RX ADMIN — HYDRALAZINE HYDROCHLORIDE 25 MG: 25 TABLET, FILM COATED ORAL at 14:26

## 2021-07-11 RX ADMIN — HYDRALAZINE HYDROCHLORIDE 50 MG: 50 TABLET ORAL at 20:41

## 2021-07-11 RX ADMIN — Medication 10 ML: at 10:43

## 2021-07-11 RX ADMIN — PANTOPRAZOLE SODIUM 40 MG: 40 TABLET, DELAYED RELEASE ORAL at 05:52

## 2021-07-11 RX ADMIN — FENTANYL CITRATE 12.5 MCG: 50 INJECTION INTRAMUSCULAR; INTRAVENOUS at 02:05

## 2021-07-11 RX ADMIN — Medication 10 ML: at 20:40

## 2021-07-11 RX ADMIN — POTASSIUM BICARBONATE 40 MEQ: 782 TABLET, EFFERVESCENT ORAL at 01:49

## 2021-07-11 RX ADMIN — LEVOTHYROXINE SODIUM 75 MCG: 75 TABLET ORAL at 05:52

## 2021-07-11 ASSESSMENT — PAIN SCALES - GENERAL
PAINLEVEL_OUTOF10: 6
PAINLEVEL_OUTOF10: 0
PAINLEVEL_OUTOF10: 8
PAINLEVEL_OUTOF10: 7
PAINLEVEL_OUTOF10: 6
PAINLEVEL_OUTOF10: 8
PAINLEVEL_OUTOF10: 9
PAINLEVEL_OUTOF10: 7
PAINLEVEL_OUTOF10: 0
PAINLEVEL_OUTOF10: 8
PAINLEVEL_OUTOF10: 8
PAINLEVEL_OUTOF10: 5

## 2021-07-11 ASSESSMENT — PAIN DESCRIPTION - FREQUENCY
FREQUENCY: CONTINUOUS
FREQUENCY: CONTINUOUS

## 2021-07-11 ASSESSMENT — PAIN DESCRIPTION - PAIN TYPE
TYPE: ACUTE PAIN
TYPE: ACUTE PAIN

## 2021-07-11 ASSESSMENT — ENCOUNTER SYMPTOMS
GASTROINTESTINAL NEGATIVE: 1
RESPIRATORY NEGATIVE: 1
EYES NEGATIVE: 1
ALLERGIC/IMMUNOLOGIC NEGATIVE: 1

## 2021-07-11 ASSESSMENT — PAIN DESCRIPTION - PROGRESSION
CLINICAL_PROGRESSION: NOT CHANGED

## 2021-07-11 ASSESSMENT — PAIN DESCRIPTION - LOCATION
LOCATION: GENERALIZED
LOCATION: GENERALIZED

## 2021-07-11 ASSESSMENT — PAIN - FUNCTIONAL ASSESSMENT: PAIN_FUNCTIONAL_ASSESSMENT: PREVENTS OR INTERFERES SOME ACTIVE ACTIVITIES AND ADLS

## 2021-07-11 ASSESSMENT — PAIN DESCRIPTION - ONSET: ONSET: ON-GOING

## 2021-07-11 ASSESSMENT — PAIN DESCRIPTION - DESCRIPTORS
DESCRIPTORS: ACHING;DISCOMFORT
DESCRIPTORS: ACHING;DISCOMFORT;DULL

## 2021-07-11 NOTE — PROGRESS NOTES
Pulmonary/Critical Care Progress Note    We are following patient for DKA    SUBJECTIVE:  36 yr old woman with Type 1 DM presented with DKA. She suffers as well from ESRD with peritoneal dialysis, recent VRE  Endocarditis and UTI. Resuscitated with fluids, insulin and antibiotics with improvements. C-diff studies negative. MEDICATIONS:   insulin lispro  0-3 Units Subcutaneous Nightly    ARIPiprazole  5 mg Oral Nightly    insulin glargine  7 Units Subcutaneous Daily    levothyroxine  75 mcg Oral Daily    metoprolol tartrate  25 mg Oral Daily    linezolid  600 mg Oral 2 times per day    mirtazapine  15 mg Oral Nightly    pantoprazole  40 mg Oral QAM AC    rOPINIRole  0.25 mg Oral Nightly    sevelamer  800 mg Oral TID WC    sodium chloride flush  10 mL Intravenous 2 times per day    heparin (porcine)  5,000 Units Subcutaneous 3 times per day    insulin lispro  0-6 Units Subcutaneous TID WC    hydrALAZINE  25 mg Oral 3 times per day    famotidine (PEPCID) injection  10 mg Intravenous BID    [START ON 7/12/2021] epoetin nena-epbx  3,000 Units Subcutaneous Once per day on Mon Wed Fri      dextrose      sodium chloride      dextrose 5 % and 0.45 % NaCl 150 mL/hr at 07/10/21 1441    [Held by provider] insulin 1.08 Units/hr (07/10/21 1553)    [Held by provider] dextrose 100 mL/hr at 07/10/21 1711     glucose, glucagon (rDNA), dextrose, sodium chloride flush, sodium chloride, polyethylene glycol, acetaminophen **OR** acetaminophen, prochlorperazine, hydrALAZINE, fentanNYL, dextrose, dextrose 5 % and 0.45 % NaCl, potassium chloride, magnesium sulfate, sodium phosphate IVPB    Review of Systems   Constitutional: Positive for fatigue. HENT: Negative. Eyes: Negative. Respiratory: Negative. Cardiovascular: Negative. Gastrointestinal: Negative. Endocrine: Negative. Genitourinary: Negative. Musculoskeletal: Negative. Skin: Negative. Allergic/Immunologic: Negative.     Neurological: Negative. Hematological: Negative. Psychiatric/Behavioral: Negative.         OBJECTIVE:  Vitals:    07/11/21 1550   BP: (!) 132/99   Pulse: 112   Resp:    Temp:    SpO2:            O2 Device: None (Room air)    PHYSICAL EXAM:  Constitutional: Pleasant woman up in the chair not eating well  Skin: No breakdown  HEENT: Normocephalic and neck supple  Neck: No JVD and carotid upstrokes bilateral  Cardiovascular: RRR and no gallop or murmer  Respiratory: clear overall and no consolidation  Gastrointestinal: soft and deminished bowel sounds  Genitourinary: deferred  Extremities: No dvt or ischemia  Neurological: Normocephalic and neck supple with nonfocal exam  Psychological: Appropriate    LABS:  WBC   Date Value Ref Range Status   07/11/2021 5.5 4.5 - 11.5 E9/L Final   07/09/2021 7.0 4.5 - 11.5 E9/L Final   07/02/2021 5.6 4.5 - 11.5 E9/L Final     Hemoglobin   Date Value Ref Range Status   07/11/2021 9.1 (L) 11.5 - 15.5 g/dL Final   07/09/2021 10.6 (L) 11.5 - 15.5 g/dL Final   07/02/2021 8.5 (L) 11.5 - 15.5 g/dL Final     Hematocrit   Date Value Ref Range Status   07/11/2021 27.3 (L) 34.0 - 48.0 % Final   07/09/2021 32.0 (L) 34.0 - 48.0 % Final   07/02/2021 25.9 (L) 34.0 - 48.0 % Final     MCV   Date Value Ref Range Status   07/11/2021 93.8 80.0 - 99.9 fL Final   07/09/2021 93.8 80.0 - 99.9 fL Final   07/02/2021 95.2 80.0 - 99.9 fL Final     Platelets   Date Value Ref Range Status   07/11/2021 233 130 - 450 E9/L Final   07/09/2021 327 130 - 450 E9/L Final   07/02/2021 256 130 - 450 E9/L Final     Sodium   Date Value Ref Range Status   07/11/2021 135 132 - 146 mmol/L Final   07/10/2021 137 132 - 146 mmol/L Final   07/10/2021 137 132 - 146 mmol/L Final     Potassium   Date Value Ref Range Status   07/10/2021 3.4 (L) 3.5 - 5.0 mmol/L Final   07/10/2021 3.5 3.5 - 5.0 mmol/L Final   07/10/2021 3.8 3.5 - 5.0 mmol/L Final     Potassium reflex Magnesium   Date Value Ref Range Status   07/11/2021 3.8 3.5 - 5.0 mmol/L Final 06/22/2021 4.0 3.5 - 5.0 mmol/L Final   06/21/2021 5.6 (H) 3.5 - 5.0 mmol/L Final     Comment:     Specimen is moderately Hemolyzed. Result may be artificially increased.      Chloride   Date Value Ref Range Status   07/11/2021 99 98 - 107 mmol/L Final   07/10/2021 100 98 - 107 mmol/L Final   07/10/2021 100 98 - 107 mmol/L Final     CO2   Date Value Ref Range Status   07/11/2021 22 22 - 29 mmol/L Final   07/10/2021 25 22 - 29 mmol/L Final   07/10/2021 24 22 - 29 mmol/L Final     BUN   Date Value Ref Range Status   07/11/2021 31 (H) 6 - 20 mg/dL Final   07/10/2021 36 (H) 6 - 20 mg/dL Final   07/10/2021 39 (H) 6 - 20 mg/dL Final     CREATININE   Date Value Ref Range Status   07/11/2021 7.0 (H) 0.5 - 1.0 mg/dL Final   07/10/2021 7.3 (H) 0.5 - 1.0 mg/dL Final   07/10/2021 7.7 (H) 0.5 - 1.0 mg/dL Final     Glucose   Date Value Ref Range Status   07/11/2021 158 (H) 74 - 99 mg/dL Final   07/10/2021 116 (H) 74 - 99 mg/dL Final   07/10/2021 141 (H) 74 - 99 mg/dL Final   05/18/2012 130 (H) 70 - 110 mg/dL Final   04/26/2012 61 (L) 70 - 110 mg/dL Final   04/25/2012 196 (H) 70 - 110 mg/dL Final     Calcium   Date Value Ref Range Status   07/11/2021 8.7 8.6 - 10.2 mg/dL Final   07/10/2021 8.1 (L) 8.6 - 10.2 mg/dL Final   07/10/2021 8.1 (L) 8.6 - 10.2 mg/dL Final     Total Protein   Date Value Ref Range Status   07/09/2021 6.1 (L) 6.4 - 8.3 g/dL Final   07/02/2021 5.8 (L) 6.4 - 8.3 g/dL Final   07/01/2021 6.3 (L) 6.4 - 8.3 g/dL Final     Albumin   Date Value Ref Range Status   07/09/2021 3.4 (L) 3.5 - 5.2 g/dL Final   07/02/2021 3.3 (L) 3.5 - 5.2 g/dL Final   07/01/2021 3.7 3.5 - 5.2 g/dL Final   05/18/2012 4.1 3.2 - 4.8 g/dL Final   04/23/2012 4.0 3.2 - 4.8 g/dL Final   04/02/2012 4.8 3.2 - 4.8 g/dL Final     Total Bilirubin   Date Value Ref Range Status   07/09/2021 0.2 0.0 - 1.2 mg/dL Final   07/02/2021 <0.2 0.0 - 1.2 mg/dL Final   07/01/2021 <0.2 0.0 - 1.2 mg/dL Final     Alkaline Phosphatase   Date Value Ref Range Status 07/09/2021 190 (H) 35 - 104 U/L Final   07/02/2021 142 (H) 35 - 104 U/L Final   07/01/2021 165 (H) 35 - 104 U/L Final     AST   Date Value Ref Range Status   07/09/2021 30 0 - 31 U/L Final     Comment:     Specimen is slightly Hemolyzed. Result may be artificially increased. 07/02/2021 53 (H) 0 - 31 U/L Final     Comment:     Specimen is moderately Hemolyzed. Result may be artificially increased. 07/01/2021 84 (H) 0 - 31 U/L Final     Comment:     Specimen is slightly Hemolyzed. Result may be artificially increased. ALT   Date Value Ref Range Status   07/09/2021 22 0 - 32 U/L Final   07/02/2021 50 (H) 0 - 32 U/L Final   07/01/2021 54 (H) 0 - 32 U/L Final     GFR Non-   Date Value Ref Range Status   07/11/2021 8 >=60 mL/min/1.73 Final     Comment:     Chronic Kidney Disease: less than 60 ml/min/1.73 sq.m. Kidney Failure: less than 15 ml/min/1.73 sq.m. Results valid for patients 18 years and older. 07/10/2021 8 >=60 mL/min/1.73 Final     Comment:     Chronic Kidney Disease: less than 60 ml/min/1.73 sq.m. Kidney Failure: less than 15 ml/min/1.73 sq.m. Results valid for patients 18 years and older. 07/10/2021 8 >=60 mL/min/1.73 Final     Comment:     Chronic Kidney Disease: less than 60 ml/min/1.73 sq.m. Kidney Failure: less than 15 ml/min/1.73 sq.m. Results valid for patients 18 years and older.        GFR    Date Value Ref Range Status   07/11/2021 8  Final   07/10/2021 8  Final   07/10/2021 8  Final     Magnesium   Date Value Ref Range Status   07/10/2021 2.0 1.6 - 2.6 mg/dL Final   07/10/2021 1.9 1.6 - 2.6 mg/dL Final   07/10/2021 1.9 1.6 - 2.6 mg/dL Final     Phosphorus   Date Value Ref Range Status   07/11/2021 4.3 2.5 - 4.5 mg/dL Final   07/10/2021 4.1 2.5 - 4.5 mg/dL Final   07/10/2021 4.5 2.5 - 4.5 mg/dL Final     Recent Labs     07/09/21 2025   PH 7.430   PO2 81.1   PCO2 43.3   HCO3 28.1*   BE 3.4*   O2SAT 95.7       RADIOLOGY:  XR CHEST PORTABLE   Final Result   No evidence of active cardiopulmonary pathology. CT ABDOMEN PELVIS WO CONTRAST Additional Contrast? None   Final Result   No evidence of acute abdominal or pelvic pathology. Peritoneal fluid likely related to peritoneal dialysis with dialysis catheter   in the right side of the pelvis. Small hiatal hernia. Large amount of stool in the colon which may reflect constipation. PROBLEM LIST:  Principal Problem:    Lactic acid acidosis  Active Problems:    Diabetic ketoacidosis without coma associated with type 1 diabetes mellitus (HCC)    Hypertension    Iron deficiency anemia    ESRD on peritoneal dialysis ([facility]    Hypothyroidism    Hyperlipidemia    Major depressive disorder  Resolved Problems:    * No resolved hospital problems. *      IMPRESSION:  1. DKA improving  2. Sepsis with lactic acidosis with hx of endocarditis under treatment  3. Hx of substance abuse  4. Hypothyroidism  5. UTI    PLAN:  1. Antibiotics  2. Insulin management  3. Renal ultrasound  4. No ultrasound of renal needed as Abd/Pelvic sonogram 7/9/21    ATTESTATION:  ICU Staff Physician note of personal involvement in Care  As the attending physician, I certify that I personally reviewed the patients history and personnally examined the patient to confirm the physical findings described above,  And that I reviewed the relevant imaging studies and available reports. I also discussed the differential diagnosis and all of the proposed management plans with the patient and individuals accompanying the patient to this visit. They had the opportunity to ask questions about the proposed management plans and to have those questions answered. This patient has a high probability of sudden, clinically significant deterioration, which requires the highest level of physician preparedness to intervene urgently.   I managed/supervised life or organ supporting interventions that required frequent physician assessment. I devoted my full attention to the direct care of this patient for the amount of time indicated below. Time I spent with the family or surrogate(s) is included only if the patient was incapable of providing the necessary information or participating in medical decisions - Time devoted to teaching and to any procedures I billed separately is not included.     CRITICAL CARE TIME:  35 min    Electronically signed by Stephanie Duke DO on 7/11/2021 at 3:58 PM

## 2021-07-11 NOTE — PROGRESS NOTES
[address] [place], [place] [address]ist   Progress Note    Admitting Date and Time: 7/9/2021  7:55 PM  Admit Dx: Lactic acid acidosis [E87.2]    Subjective:    Pt feels a little better, today she is more verbal than yesterday. Patient complains of headache, which she blames on her elevated blood pressure. ROS: denies fever, chills, cp, sob, n/v, HA unless stated above.      insulin lispro  0-3 Units Subcutaneous Nightly    ARIPiprazole  5 mg Oral Nightly    insulin glargine  7 Units Subcutaneous Daily    levothyroxine  75 mcg Oral Daily    metoprolol tartrate  25 mg Oral Daily    linezolid  600 mg Oral 2 times per day    mirtazapine  15 mg Oral Nightly    pantoprazole  40 mg Oral QAM AC    rOPINIRole  0.25 mg Oral Nightly    sevelamer  800 mg Oral TID WC    sodium chloride flush  10 mL Intravenous 2 times per day    heparin (porcine)  5,000 Units Subcutaneous 3 times per day    insulin lispro  0-6 Units Subcutaneous TID WC    hydrALAZINE  25 mg Oral 3 times per day    famotidine (PEPCID) injection  10 mg Intravenous BID    [START ON 7/12/2021] epoetin nena-epbx  3,000 Units Subcutaneous Once per day on Mon Wed Fri     glucose, 15 g, PRN  glucagon (rDNA), 1 mg, PRN  dextrose, 100 mL/hr, PRN  sodium chloride flush, 10 mL, PRN  sodium chloride, 25 mL, PRN  polyethylene glycol, 17 g, Daily PRN  acetaminophen, 650 mg, Q6H PRN   Or  acetaminophen, 650 mg, Q6H PRN  prochlorperazine, 10 mg, Q6H PRN  hydrALAZINE, 20 mg, Q6H PRN  fentanNYL, 12.5 mcg, Q2H PRN  dextrose, 12.5 g, PRN  dextrose 5 % and 0.45 % NaCl, , Continuous PRN  potassium chloride, 10 mEq, PRN  magnesium sulfate, 2,000 mg, PRN  sodium phosphate IVPB, 10 mmol, PRN         Objective:    BP (!) 132/108   Pulse 105   Temp 98.4 °F (36.9 °C) (Oral)   Resp 18   Ht 5' 4\" (1.626 m)   Wt 136 lb 11 oz (62 kg)   SpO2 99%   BMI 23.46 kg/m²   General Appearance: alert and oriented to person, place and time and in no acute distress  Skin: warm and dry  Head: normocephalic and atraumatic  Eyes: pupils equal, round, and reactive to light, extraocular eye movements intact, conjunctivae normal  Neck: neck supple and non tender without mass   Pulmonary/Chest: clear to auscultation bilaterally- no wheezes, rales or rhonchi, normal air movement, no respiratory distress  Cardiovascular: normal rate, normal S1 and S2 and no carotid bruits  Abdomen: soft, non-tender, non-distended, normal bowel sounds   Extremities: no cyanosis, no clubbing and no edema  Neurologic: no cranial nerve deficit and speech normal      Recent Labs     07/10/21  1818 07/10/21  2312 07/11/21  0535    137 135   K 3.5 3.4* 3.8    100 99   CO2 24 25 22   BUN 39* 36* 31*   CREATININE 7.7* 7.3* 7.0*   GLUCOSE 141* 116* 158*   CALCIUM 8.1* 8.1* 8.7       Recent Labs     07/09/21 2010   ALKPHOS 190*   PROT 6.1*   LABALBU 3.4*   BILITOT 0.2   AST 30   ALT 22       Recent Labs     07/09/21 2010 07/11/21  0535   WBC 7.0 5.5   RBC 3.41* 2.91*   HGB 10.6* 9.1*   HCT 32.0* 27.3*   MCV 93.8 93.8   MCH 31.1 31.3   MCHC 33.1 33.3   RDW 15.8* 15.3*    233   MPV 9.3 9.6        Radiology:   XR CHEST PORTABLE   Final Result   No evidence of active cardiopulmonary pathology. CT ABDOMEN PELVIS WO CONTRAST Additional Contrast? None   Final Result   No evidence of acute abdominal or pelvic pathology. Peritoneal fluid likely related to peritoneal dialysis with dialysis catheter   in the right side of the pelvis. Small hiatal hernia. Large amount of stool in the colon which may reflect constipation. Assessment:  Principal Problem:    Lactic acid acidosis  Active Problems:    Diabetic ketoacidosis without coma associated with type 1 diabetes mellitus (HCC)    Hypertension    Iron deficiency anemia    ESRD on peritoneal dialysis ([facility]    Hypothyroidism    Hyperlipidemia    Major depressive disorder  Resolved Problems:    * No resolved hospital problems.

## 2021-07-11 NOTE — PLAN OF CARE
Problem: Serum Glucose Level - Abnormal:  Goal: Ability to maintain appropriate glucose levels will improve  Description: Ability to maintain appropriate glucose levels will improve  Outcome: Met This Shift

## 2021-07-12 LAB
ANION GAP SERPL CALCULATED.3IONS-SCNC: 15 MMOL/L (ref 7–16)
BASOPHILS ABSOLUTE: 0.06 E9/L (ref 0–0.2)
BASOPHILS RELATIVE PERCENT: 1.1 % (ref 0–2)
BETA-HYDROXYBUTYRATE: 0.09 MMOL/L (ref 0.02–0.27)
BUN BLDV-MCNC: 33 MG/DL (ref 6–20)
CALCIUM SERPL-MCNC: 8.7 MG/DL (ref 8.6–10.2)
CHLORIDE BLD-SCNC: 100 MMOL/L (ref 98–107)
CO2: 24 MMOL/L (ref 22–29)
CREAT SERPL-MCNC: 7.4 MG/DL (ref 0.5–1)
EOSINOPHILS ABSOLUTE: 0.2 E9/L (ref 0.05–0.5)
EOSINOPHILS RELATIVE PERCENT: 3.7 % (ref 0–6)
FOLATE: 10.7 NG/ML (ref 4.8–24.2)
GFR AFRICAN AMERICAN: 8
GFR NON-AFRICAN AMERICAN: 8 ML/MIN/1.73
GLUCOSE BLD-MCNC: 186 MG/DL (ref 74–99)
HAV IGM SER IA-ACNC: NORMAL
HBV SURFACE AB TITR SER: REACTIVE {TITER}
HCT VFR BLD CALC: 25.2 % (ref 34–48)
HEMOGLOBIN: 8.4 G/DL (ref 11.5–15.5)
HEPATITIS B CORE IGM ANTIBODY: NORMAL
HEPATITIS B SURFACE ANTIGEN INTERPRETATION: NORMAL
HEPATITIS C ANTIBODY INTERPRETATION: NORMAL
IMMATURE GRANULOCYTES #: 0.02 E9/L
IMMATURE GRANULOCYTES %: 0.4 % (ref 0–5)
IRON SATURATION: 68 % (ref 15–50)
IRON: 69 MCG/DL (ref 37–145)
LYMPHOCYTES ABSOLUTE: 1.96 E9/L (ref 1.5–4)
LYMPHOCYTES RELATIVE PERCENT: 36.1 % (ref 20–42)
MAGNESIUM: 1.8 MG/DL (ref 1.6–2.6)
MCH RBC QN AUTO: 31.8 PG (ref 26–35)
MCHC RBC AUTO-ENTMCNC: 33.3 % (ref 32–34.5)
MCV RBC AUTO: 95.5 FL (ref 80–99.9)
METER GLUCOSE: 166 MG/DL (ref 74–99)
METER GLUCOSE: 167 MG/DL (ref 74–99)
METER GLUCOSE: 224 MG/DL (ref 74–99)
METER GLUCOSE: 238 MG/DL (ref 74–99)
METER GLUCOSE: <40 MG/DL (ref 74–99)
MONOCYTES ABSOLUTE: 0.25 E9/L (ref 0.1–0.95)
MONOCYTES RELATIVE PERCENT: 4.6 % (ref 2–12)
NEUTROPHILS ABSOLUTE: 2.94 E9/L (ref 1.8–7.3)
NEUTROPHILS RELATIVE PERCENT: 54.1 % (ref 43–80)
PDW BLD-RTO: 15.5 FL (ref 11.5–15)
PHOSPHORUS: 4.6 MG/DL (ref 2.5–4.5)
PLATELET # BLD: 184 E9/L (ref 130–450)
PMV BLD AUTO: 10.1 FL (ref 7–12)
POTASSIUM SERPL-SCNC: 3.5 MMOL/L (ref 3.5–5)
RBC # BLD: 2.64 E12/L (ref 3.5–5.5)
SODIUM BLD-SCNC: 139 MMOL/L (ref 132–146)
TOTAL IRON BINDING CAPACITY: 102 MCG/DL (ref 250–450)
TSH SERPL DL<=0.05 MIU/L-ACNC: 11.53 UIU/ML (ref 0.27–4.2)
VITAMIN B-12: 1099 PG/ML (ref 211–946)
VITAMIN D 25-HYDROXY: <5 NG/ML (ref 30–100)
WBC # BLD: 5.4 E9/L (ref 4.5–11.5)

## 2021-07-12 PROCEDURE — 83735 ASSAY OF MAGNESIUM: CPT

## 2021-07-12 PROCEDURE — 6360000002 HC RX W HCPCS: Performed by: INTERNAL MEDICINE

## 2021-07-12 PROCEDURE — 83550 IRON BINDING TEST: CPT

## 2021-07-12 PROCEDURE — 2580000003 HC RX 258: Performed by: INTERNAL MEDICINE

## 2021-07-12 PROCEDURE — 90945 DIALYSIS ONE EVALUATION: CPT

## 2021-07-12 PROCEDURE — 6370000000 HC RX 637 (ALT 250 FOR IP): Performed by: HOSPITALIST

## 2021-07-12 PROCEDURE — 2580000003 HC RX 258: Performed by: HOSPITALIST

## 2021-07-12 PROCEDURE — 6370000000 HC RX 637 (ALT 250 FOR IP): Performed by: INTERNAL MEDICINE

## 2021-07-12 PROCEDURE — 1200000000 HC SEMI PRIVATE

## 2021-07-12 PROCEDURE — 6360000002 HC RX W HCPCS: Performed by: HOSPITALIST

## 2021-07-12 PROCEDURE — 82607 VITAMIN B-12: CPT

## 2021-07-12 PROCEDURE — 82746 ASSAY OF FOLIC ACID SERUM: CPT

## 2021-07-12 PROCEDURE — 84443 ASSAY THYROID STIM HORMONE: CPT

## 2021-07-12 PROCEDURE — 85025 COMPLETE CBC W/AUTO DIFF WBC: CPT

## 2021-07-12 PROCEDURE — 36415 COLL VENOUS BLD VENIPUNCTURE: CPT

## 2021-07-12 PROCEDURE — 6370000000 HC RX 637 (ALT 250 FOR IP): Performed by: NURSE PRACTITIONER

## 2021-07-12 PROCEDURE — 82962 GLUCOSE BLOOD TEST: CPT

## 2021-07-12 PROCEDURE — 84100 ASSAY OF PHOSPHORUS: CPT

## 2021-07-12 PROCEDURE — C9113 INJ PANTOPRAZOLE SODIUM, VIA: HCPCS | Performed by: HOSPITALIST

## 2021-07-12 PROCEDURE — 82306 VITAMIN D 25 HYDROXY: CPT

## 2021-07-12 PROCEDURE — 99233 SBSQ HOSP IP/OBS HIGH 50: CPT | Performed by: INTERNAL MEDICINE

## 2021-07-12 PROCEDURE — 2500000003 HC RX 250 WO HCPCS: Performed by: INTERNAL MEDICINE

## 2021-07-12 PROCEDURE — 83540 ASSAY OF IRON: CPT

## 2021-07-12 PROCEDURE — 80048 BASIC METABOLIC PNL TOTAL CA: CPT

## 2021-07-12 PROCEDURE — 82010 KETONE BODYS QUAN: CPT

## 2021-07-12 RX ORDER — PANTOPRAZOLE SODIUM 40 MG/10ML
40 INJECTION, POWDER, LYOPHILIZED, FOR SOLUTION INTRAVENOUS 2 TIMES DAILY
Status: DISCONTINUED | OUTPATIENT
Start: 2021-07-12 | End: 2021-07-15 | Stop reason: HOSPADM

## 2021-07-12 RX ORDER — SODIUM CHLORIDE 9 MG/ML
10 INJECTION INTRAVENOUS 2 TIMES DAILY
Status: DISCONTINUED | OUTPATIENT
Start: 2021-07-12 | End: 2021-07-15 | Stop reason: HOSPADM

## 2021-07-12 RX ORDER — SUCRALFATE 1 G/1
1 TABLET ORAL EVERY 6 HOURS SCHEDULED
Status: DISCONTINUED | OUTPATIENT
Start: 2021-07-12 | End: 2021-07-15 | Stop reason: HOSPADM

## 2021-07-12 RX ORDER — POLYETHYLENE GLYCOL 3350 17 G/17G
17 POWDER, FOR SOLUTION ORAL 2 TIMES DAILY
Status: DISCONTINUED | OUTPATIENT
Start: 2021-07-12 | End: 2021-07-15 | Stop reason: HOSPADM

## 2021-07-12 RX ORDER — POTASSIUM CHLORIDE 20 MEQ/1
20 TABLET, EXTENDED RELEASE ORAL ONCE
Status: COMPLETED | OUTPATIENT
Start: 2021-07-12 | End: 2021-07-12

## 2021-07-12 RX ADMIN — HYDRALAZINE HYDROCHLORIDE 20 MG: 20 INJECTION INTRAMUSCULAR; INTRAVENOUS at 20:15

## 2021-07-12 RX ADMIN — MIRTAZAPINE 15 MG: 15 TABLET, FILM COATED ORAL at 21:45

## 2021-07-12 RX ADMIN — SEVELAMER CARBONATE 800 MG: 800 TABLET, FILM COATED ORAL at 15:51

## 2021-07-12 RX ADMIN — HEPARIN SODIUM 5000 UNITS: 5000 INJECTION INTRAVENOUS; SUBCUTANEOUS at 15:52

## 2021-07-12 RX ADMIN — ACETAMINOPHEN 650 MG: 325 TABLET ORAL at 17:40

## 2021-07-12 RX ADMIN — LINEZOLID 600 MG: 600 TABLET, FILM COATED ORAL at 08:50

## 2021-07-12 RX ADMIN — DEXTROSE MONOHYDRATE 12.5 G: 25 INJECTION, SOLUTION INTRAVENOUS at 15:47

## 2021-07-12 RX ADMIN — PANTOPRAZOLE SODIUM 40 MG: 40 INJECTION, POWDER, FOR SOLUTION INTRAVENOUS at 21:54

## 2021-07-12 RX ADMIN — FENTANYL CITRATE 12.5 MCG: 50 INJECTION INTRAMUSCULAR; INTRAVENOUS at 05:33

## 2021-07-12 RX ADMIN — INSULIN LISPRO 1 UNITS: 100 INJECTION, SOLUTION INTRAVENOUS; SUBCUTANEOUS at 09:01

## 2021-07-12 RX ADMIN — HYDRALAZINE HYDROCHLORIDE 50 MG: 50 TABLET ORAL at 23:19

## 2021-07-12 RX ADMIN — INSULIN GLARGINE 7 UNITS: 100 INJECTION, SOLUTION SUBCUTANEOUS at 09:01

## 2021-07-12 RX ADMIN — INSULIN LISPRO 2 UNITS: 100 INJECTION, SOLUTION INTRAVENOUS; SUBCUTANEOUS at 22:02

## 2021-07-12 RX ADMIN — FENTANYL CITRATE 12.5 MCG: 50 INJECTION INTRAMUSCULAR; INTRAVENOUS at 18:36

## 2021-07-12 RX ADMIN — CALCIUM CARBONATE (ANTACID) CHEW TAB 500 MG 500 MG: 500 CHEW TAB at 09:09

## 2021-07-12 RX ADMIN — LINEZOLID 600 MG: 600 TABLET, FILM COATED ORAL at 21:45

## 2021-07-12 RX ADMIN — LEVOTHYROXINE SODIUM 75 MCG: 75 TABLET ORAL at 08:50

## 2021-07-12 RX ADMIN — EPOETIN ALFA-EPBX 3000 UNITS: 3000 INJECTION, SOLUTION INTRAVENOUS; SUBCUTANEOUS at 09:05

## 2021-07-12 RX ADMIN — Medication 10 ML: at 08:51

## 2021-07-12 RX ADMIN — Medication 10 ML: at 21:45

## 2021-07-12 RX ADMIN — ARIPIPRAZOLE 5 MG: 5 TABLET ORAL at 21:45

## 2021-07-12 RX ADMIN — CARVEDILOL 6.25 MG: 6.25 TABLET, FILM COATED ORAL at 17:40

## 2021-07-12 RX ADMIN — FENTANYL CITRATE 12.5 MCG: 50 INJECTION INTRAMUSCULAR; INTRAVENOUS at 21:45

## 2021-07-12 RX ADMIN — ROPINIROLE HYDROCHLORIDE 0.25 MG: 0.25 TABLET, FILM COATED ORAL at 21:45

## 2021-07-12 RX ADMIN — SEVELAMER CARBONATE 800 MG: 800 TABLET, FILM COATED ORAL at 08:50

## 2021-07-12 RX ADMIN — SODIUM CHLORIDE, PRESERVATIVE FREE 10 ML: 5 INJECTION INTRAVENOUS at 21:46

## 2021-07-12 RX ADMIN — FAMOTIDINE 10 MG: 10 INJECTION INTRAVENOUS at 08:50

## 2021-07-12 RX ADMIN — HYDRALAZINE HYDROCHLORIDE 50 MG: 50 TABLET ORAL at 05:34

## 2021-07-12 RX ADMIN — POTASSIUM CHLORIDE 20 MEQ: 20 TABLET, EXTENDED RELEASE ORAL at 09:04

## 2021-07-12 RX ADMIN — PANTOPRAZOLE SODIUM 40 MG: 40 TABLET, DELAYED RELEASE ORAL at 08:50

## 2021-07-12 RX ADMIN — FENTANYL CITRATE 12.5 MCG: 50 INJECTION INTRAMUSCULAR; INTRAVENOUS at 08:51

## 2021-07-12 RX ADMIN — SUCRALFATE 1 G: 1 TABLET ORAL at 15:51

## 2021-07-12 RX ADMIN — SUCRALFATE 1 G: 1 TABLET ORAL at 17:40

## 2021-07-12 RX ADMIN — HYDRALAZINE HYDROCHLORIDE 50 MG: 50 TABLET ORAL at 15:51

## 2021-07-12 RX ADMIN — CARVEDILOL 6.25 MG: 6.25 TABLET, FILM COATED ORAL at 08:50

## 2021-07-12 RX ADMIN — POLYETHYLENE GLYCOL 3350 17 G: 17 POWDER, FOR SOLUTION ORAL at 15:50

## 2021-07-12 RX ADMIN — SUCRALFATE 1 G: 1 TABLET ORAL at 23:19

## 2021-07-12 RX ADMIN — SODIUM CHLORIDE, PRESERVATIVE FREE 10 ML: 5 INJECTION INTRAVENOUS at 21:53

## 2021-07-12 RX ADMIN — HEPARIN SODIUM 5000 UNITS: 5000 INJECTION INTRAVENOUS; SUBCUTANEOUS at 06:21

## 2021-07-12 RX ADMIN — SEVELAMER CARBONATE 800 MG: 800 TABLET, FILM COATED ORAL at 17:40

## 2021-07-12 ASSESSMENT — PAIN DESCRIPTION - PROGRESSION
CLINICAL_PROGRESSION: NOT CHANGED

## 2021-07-12 ASSESSMENT — PAIN - FUNCTIONAL ASSESSMENT: PAIN_FUNCTIONAL_ASSESSMENT: PREVENTS OR INTERFERES SOME ACTIVE ACTIVITIES AND ADLS

## 2021-07-12 ASSESSMENT — PAIN DESCRIPTION - ONSET: ONSET: ON-GOING

## 2021-07-12 ASSESSMENT — PAIN DESCRIPTION - PAIN TYPE
TYPE: CHRONIC PAIN
TYPE: CHRONIC PAIN

## 2021-07-12 ASSESSMENT — PAIN SCALES - GENERAL
PAINLEVEL_OUTOF10: 8
PAINLEVEL_OUTOF10: 7
PAINLEVEL_OUTOF10: 8
PAINLEVEL_OUTOF10: 8
PAINLEVEL_OUTOF10: 3
PAINLEVEL_OUTOF10: 0
PAINLEVEL_OUTOF10: 2
PAINLEVEL_OUTOF10: 8
PAINLEVEL_OUTOF10: 6
PAINLEVEL_OUTOF10: 0
PAINLEVEL_OUTOF10: 0

## 2021-07-12 ASSESSMENT — PAIN DESCRIPTION - ORIENTATION: ORIENTATION: OTHER (COMMENT)

## 2021-07-12 ASSESSMENT — PAIN DESCRIPTION - LOCATION
LOCATION: GENERALIZED
LOCATION: GENERALIZED

## 2021-07-12 ASSESSMENT — PAIN DESCRIPTION - DESCRIPTORS
DESCRIPTORS: ACHING;CONSTANT;DISCOMFORT
DESCRIPTORS: ACHING;CONSTANT;DISCOMFORT

## 2021-07-12 ASSESSMENT — PAIN DESCRIPTION - FREQUENCY: FREQUENCY: CONTINUOUS

## 2021-07-12 NOTE — PROGRESS NOTES
Department of Internal Medicine  Nephrology Progress Note    Events reviewed.     SUBJECTIVE:  We are following Wilton Flores for ESRD on PD    PHYSICAL EXAM:      Vitals:    VITALS:  BP (!) 185/113   Pulse 100   Temp 98.4 °F (36.9 °C) (Oral)   Resp 18   Ht 5' 4\" (1.626 m)   Wt 136 lb 1.6 oz (61.7 kg)   SpO2 99%   BMI 23.36 kg/m²   24HR BLOOD PRESSURE RANGE:  Systolic (48ETV), SXO:049 , Min:124 , ZWY:179   ; Diastolic (34NPL), RJ, Min:81, Max:117    PD Catheter Exam:  PD catheter exit site clean    Constitutional: Awake in no acute distress  HEENT:  Normocephalic, PERRL  Respiratory: Decreased breath sounds on the basis  Cardiovascular/Edema:  RRR, S1/S2  Gastrointestinal:  Soft, PD catheter exit site clean   Neurologic:  Nonfocal, AVELAR  Skin:  Warm, dry, no lesions  Other:  no edema    DATA:    CBC:   Lab Results   Component Value Date    WBC 5.4 2021    RBC 2.64 2021    HGB 8.4 2021    HCT 25.2 2021    MCV 95.5 2021    MCH 31.8 2021    MCHC 33.3 2021    RDW 15.5 2021     2021    MPV 10.1 2021     CMP:    Lab Results   Component Value Date     2021    K 3.5 2021    K 3.8 2021     2021    CO2 24 2021    BUN 33 2021    CREATININE 7.4 2021    GFRAA 8 2021    LABGLOM 8 2021    GLUCOSE 186 2021    GLUCOSE 130 2012    PROT 6.1 2021    LABALBU 3.4 2021    LABALBU 4.1 2012    CALCIUM 8.7 2021    BILITOT 0.2 2021    ALKPHOS 190 2021    AST 30 2021    ALT 22 2021     Radiology Review:      IMPRESSION/RECOMMENDATIONS:    Collette Oakland is a 62-year-old female with history of ESRD on Peritoneal Dialysis, poorly controlled type I DM with multiple admissions for DKA, gastroparesis, HTN, 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 In the emergency room she was found to have a glucose level of 391, beta hydroxybutyrate of 2.49. She was admitted to MICU. 1. ESRD on peritoneal dialysis, to continue CCPD 4 exchanges over 10 hours of 1.5% with long dwell of 2.5%, tolerating well. 2. HTN, on hydralazine 25 mg three times daily and metoprolol 25 mg daily, BP running high today  3. Hx of UTI  4. MBD of CKD, on sevelamer  5.  Anemia of CKD,     Plan:     · Continue CCPD 4 exchanges over 10 hours of 1.5% with long dwell of 2.5%  · Monitor electrolytes daily  · Check urine culture   · Epoetin alpha 3000 units 3 times a week  · Replace potassium  · Monitor BP     Electronically signed by HEBER Maria CNP on 7/12/2021 at 8:13 AM

## 2021-07-12 NOTE — FLOWSHEET NOTE
CCPD tx implemented at this time. Aseptic technique maintained t/o procedure. Drain and fill cycles completed w/o difficulty. No overt distress. drssing changed to PD exit site. Skin unremarkable. No c/o of pain/ discomfort.

## 2021-07-12 NOTE — CONSULTS
Enrrique Ambriz M.D. [facility]  Dr. Diana Crawford M.D.,  Dr. Jael Kay M.D.,  Dr. Jameson Oneal D.O.,  Dr. Divine Wray D.O. ,  Dr. Belkys Betancur M.D.,  Dr. Su Sands D.O. Wilton Flores  36 y.o.  female      Re: Persistent nausea and epigastric pain with noted satiety. Need for EGD? Last one was October 2019  Requesting physician: Dr. Miri Fernandez  Date:12:15 PM 7/12/2021          HPI: 79-year-old female patient who has been in the hospital since the 10th when she was admitted with diabetic ketoacidosis. Apparently for at least 3 to 4 days patient is experiencing epigastric pain associated with nausea vomiting and worsening heartburn. She reports previous history of upper endoscopy which according to the patient was fairly unremarkable but she cannot provide details. Patient denies hematemesis or emesis of coffee-ground material.  She denies blood in the stool or melena. She is noted to be anemic with hemoglobin at baseline at 8.4 and hematocrit of 25.2. Additional laboratory work reveals BUN of 33 creatinine of 7.4 consistent with patient history of end-stage renal disease and peritoneal dialysis. Liver profile reveals elevated alkaline phosphatase but nonelevated transaminase and nonelevated bilirubin. Of note is also nonelevated lipase. Endocrine studies revealed hemoglobin A1c of 8.8. Initial abdominal imaging with CT scan of the abdomen and pelvis revealed large amount of stool in the colon no bowel movements have been reported in patient I&O.     Information sources:   -Patient  -medical record  -health care team    PMHx:  Past Medical History:   Diagnosis Date    Acute congestive heart failure ([address]     BC (acute kidney injury) ([address] 10/1/2019    Cardiac arrest ([address] 2/15/2021    Cephalgia 10/9/2019    Chronic kidney disease     Depression     Diabetes mellitus ([address]     Diabetic gastroparesis associated with type 1 diabetes mellitus ([address] INCISION AND DRAINAGE, DEBRIDEMENT, WOUND VAC APPLICATION performed by Valdo Vickers DPM at [address] Left 5/18/2021    LEFT LEG DEBRIDEMENT BIOPSY POSS APPLICATION WOUND VAC performed by Valdo Vickers DPM at [place] [address] N/A 6/26/2020    LAPAROSCOPIC INSERTION PERITONEAL DIALYSIS CATHETER performed by Candelaria Santos MD at [facility] ESOPHAGOGASTRODUODENOSCOPY TRANSORAL DIAGNOSTIC N/A 5/30/2018    EGD ESOPHAGOGASTRODUODENOSCOPY performed by Nelda Jalloh MD at [id number] [place] [address] TRANSESOPHAGEAL ECHOCARDIOGRAM N/A 10/19/2020    TRANSESOPHAGEAL ECHOCARDIOGRAM WITH BUBBLE STUDY performed by Shyam Garcia MD at [address] [place] [address] TUNNELED VENOUS PORT PLACEMENT  04/2018    UPPER GASTROINTESTINAL ENDOSCOPY  12/18/2018    EGD BIOPSY performed by Luna Childers MD at [address] [place] [address] N/A 10/11/2019    EGD ESOPHAGOGASTRODUODENOSCOPY performed by Lea Romero DO at [address] [facility]:  Current Facility-Administered Medications   Medication Dose Route Frequency Provider Last Rate Last Admin    insulin lispro (HUMALOG) injection vial 0-3 Units  0-3 Units Subcutaneous Nightly Sueajessie Marycruz, APRN - CNP        calcium carbonate (TUMS) chewable tablet 500 mg  500 mg Oral TID PRN Jose Juan Figueroa, DO   500 mg at 07/12/21 0909    hydrALAZINE (APRESOLINE) tablet 50 mg  50 mg Oral 3 times per day Evin Lopez DO   50 mg at 07/12/21 0534    carvedilol (COREG) tablet 6.25 mg  6.25 mg Oral BID  Evin Lopez DO   6.25 mg at 07/12/21 0850    ARIPiprazole (ABILIFY) tablet 5 mg  5 mg Oral Nightly Jl Gonzalez MD   5 mg at 07/11/21 2023    insulin glargine (LANTUS) injection vial 7 Units  7 Units Subcutaneous Daily Loreta Singleton MD   7 Units at 07/12/21 0901    levothyroxine (SYNTHROID) tablet 75 mcg  75 mcg Oral Daily Jl Gonzalez MD   75 mcg at 07/12/21 0850    linezolid (ZYVOX) tablet 600 mg 600 mg Oral 2 times per day Usama Schmitz MD   600 mg at 07/12/21 0850    mirtazapine (REMERON) tablet 15 mg  15 mg Oral Nightly Jl Gonzalez MD   15 mg at 07/11/21 2024    pantoprazole (PROTONIX) tablet 40 mg  40 mg Oral QAM AC Jl Gonzalez MD   40 mg at 07/12/21 0850    rOPINIRole (REQUIP) tablet 0.25 mg  0.25 mg Oral Nightly Jl Gonzalez MD   0.25 mg at 07/11/21 2023    sevelamer (RENVELA) tablet 800 mg  800 mg Oral TID  Jl Gonzalez MD   800 mg at 07/12/21 0850    glucose (GLUTOSE) 40 % oral gel 15 g  15 g Oral PRN Jl Gonzalez MD        glucagon (rDNA) injection 1 mg  1 mg Intramuscular PRN Jl Gonzalez MD        dextrose 5 % solution  100 mL/hr Intravenous PRN Jl Gonzalez MD        sodium chloride flush 0.9 % injection 10 mL  10 mL Intravenous 2 times per day Jl Gonzalez MD   10 mL at 07/12/21 0851    sodium chloride flush 0.9 % injection 10 mL  10 mL Intravenous PRN Jl Gonzalez MD        0.9 % sodium chloride infusion  25 mL Intravenous PRN Jl Gonzalez MD        polyethylene glycol (GLYCOLAX) packet 17 g  17 g Oral Daily PRN Jl Gonzalez MD        acetaminophen (TYLENOL) tablet 650 mg  650 mg Oral Q6H PRN Jl Gonzalez MD   650 mg at 07/11/21 2038    Or    acetaminophen (TYLENOL) suppository 650 mg  650 mg Rectal Q6H PRN Jl Gonzalez MD        prochlorperazine (COMPAZINE) injection 10 mg  10 mg Intravenous Q6H PRN Jl Gonzalez MD   10 mg at 07/11/21 1037    heparin (porcine) injection 5,000 Units  5,000 Units Subcutaneous 3 times per day Usama Schmitz MD   5,000 Units at 07/12/21 0621    insulin lispro (HUMALOG) injection vial 0-6 Units  0-6 Units Subcutaneous TID  Jl Gonzalez MD   1 Units at 07/12/21 0901    hydrALAZINE (APRESOLINE) injection 20 mg  20 mg Intravenous Q6H PRN Evin Lopez, DO   20 mg at 07/10/21 2041    fentaNYL (SUBLIMAZE) injection 12.5 mcg  12.5 mcg Intravenous Q2H PRN Evin Lopez DO   12.5 mcg at file   Social History Narrative    Not on file     Social Determinants of Health     Financial Resource Strain: High Risk    Difficulty of Paying Living Expenses: Very hard   Food Insecurity: No Food Insecurity    Worried About Running Out of Food in the Last Year: Never true    [place] of Food in the Last Year: Never true   Transportation Needs: No Transportation Needs    Lack of Transportation (Medical): No    Lack of Transportation (Non-Medical): No   Physical Activity:     Days of Exercise per Week:     Minutes of Exercise per Session:    Stress:     Feeling of Stress :    Social Connections:     Frequency of Communication with Friends and Family:     Frequency of Social Gatherings with Friends and Family:     Attends [employer] Services:     Active Member of Clubs or Organizations:     Attends Club or Organization Meetings:     Marital Status:    Intimate Partner Violence:     Fear of Current or Ex-Partner:     Emotionally Abused:     Physically Abused:     Sexually Abused:        FamHx:  Family History   Problem Relation Age of Onset    Asthma Mother     Hypertension Mother     High Blood Pressure Mother     Diabetes Mother     Asthma Brother     High Blood Pressure Father        Allergy:  Allergies   Allergen Reactions    Cefepime Other (See Comments)     \" neurological side effect- slurred speech and confusion    Toradol [Ketorolac Tromethamine] Anaphylaxis and Hives         ROS: As described in the HPI and in addition is negative upon detailed review of systems or unobtainable unless otherwise stated in this dictation. PE:  BP (!) 170/111   Pulse 93   Temp 97.7 °F (36.5 °C) (Oral)   Resp 20   Ht 5' 4\" (1.626 m)   Wt 136 lb 1.6 oz (61.7 kg)   SpO2 98%   BMI 23.36 kg/m²     Gen.: NAD/ female.   AAO x3  Head: Atraumatic/normocephalic  Eyes: EOMI/anicteric sclera/no conjunctival erythema  ENT: Moist oral mucosa/nasal piercing  Neck: Supple with trachea midline  Chest: CTA B  Cor: Regular. Monitor shows sinus rhythm  Abd.: Soft and mildly tender in the epigastrium. No guarding. BS +  Extr.:  No significant peripheral edema  Muscles: Decreased muscle tone and bulk throughout  Skin: Warm and dry/anicteric        DATA:     Lab Results   Component Value Date    WBC 5.4 07/12/2021    RBC 2.64 07/12/2021    HGB 8.4 07/12/2021    HCT 25.2 07/12/2021    MCV 95.5 07/12/2021    MCH 31.8 07/12/2021    MCHC 33.3 07/12/2021    RDW 15.5 07/12/2021     07/12/2021    MPV 10.1 07/12/2021     Lab Results   Component Value Date     07/12/2021    K 3.5 07/12/2021    K 3.8 07/11/2021     07/12/2021    CO2 24 07/12/2021    BUN 33 07/12/2021    CREATININE 7.4 07/12/2021    CALCIUM 8.7 07/12/2021    PROT 6.1 07/09/2021    LABALBU 3.4 07/09/2021    LABALBU 4.1 05/18/2012    BILITOT 0.2 07/09/2021    ALKPHOS 190 07/09/2021    AST 30 07/09/2021    ALT 22 07/09/2021     Lab Results   Component Value Date    LIPASE 11 07/09/2021     Lab Results   Component Value Date    AMYLASE 40 12/14/2018         ASSESSMENT/PLAN:  1.  Nausea/vomiting/epigastric pain  -GERD versus PUD versus gastroparesis or combination thereof  -Possible contribution from constipation  -Change PPI to twice a day dosing IV  -Carafate  -Gastric emptying study  -If conservative measures fail, consider inpatient endoscopy    2. Anemia  -H&H at baseline with decreased since admission likely related to hemodilution  -Obtain iron studies and FOBT  -Monitor H&H  -EGD as above  -Outpatient colonoscopy    Thank you for the opportunity see this patient in consultation      Stefanie Ventura MD  7/12/2021  12:15 PM    NOTE:  This report was transcribed using voice recognition software. Every effort was made to ensure accuracy; however, inadvertent computerized transcription errors may be present.

## 2021-07-12 NOTE — PROGRESS NOTES
ccpd initiated aseptically per p/p vss no complaints dressing to pd cath changed no signs of infection.  Initial effluent fluid clear

## 2021-07-12 NOTE — PROGRESS NOTES
[address] [place], [place] [address]ist   Progress Note    Admitting Date and Time: 7/9/2021  7:55 PM  Admit Dx: Lactic acid acidosis [E87.2]    Subjective:    7/11: Pt feels a little better, today she is more verbal than yesterday. Patient complains of headache, which she blames on her elevated blood pressure. 7/12:  Patient complaining that stomach is burning all the time. She states it is hard to eat. ROS: denies fever, chills, cp, sob, n/v, HA unless stated above.      insulin lispro  0-3 Units Subcutaneous Nightly    hydrALAZINE  50 mg Oral 3 times per day    carvedilol  6.25 mg Oral BID WC    ARIPiprazole  5 mg Oral Nightly    insulin glargine  7 Units Subcutaneous Daily    levothyroxine  75 mcg Oral Daily    linezolid  600 mg Oral 2 times per day    mirtazapine  15 mg Oral Nightly    pantoprazole  40 mg Oral QAM AC    rOPINIRole  0.25 mg Oral Nightly    sevelamer  800 mg Oral TID WC    sodium chloride flush  10 mL Intravenous 2 times per day    heparin (porcine)  5,000 Units Subcutaneous 3 times per day    insulin lispro  0-6 Units Subcutaneous TID WC    famotidine (PEPCID) injection  10 mg Intravenous BID    epoetin nena-epbx  3,000 Units Subcutaneous Once per day on Mon Wed Fri     calcium carbonate, 500 mg, TID PRN  glucose, 15 g, PRN  glucagon (rDNA), 1 mg, PRN  dextrose, 100 mL/hr, PRN  sodium chloride flush, 10 mL, PRN  sodium chloride, 25 mL, PRN  polyethylene glycol, 17 g, Daily PRN  acetaminophen, 650 mg, Q6H PRN   Or  acetaminophen, 650 mg, Q6H PRN  prochlorperazine, 10 mg, Q6H PRN  hydrALAZINE, 20 mg, Q6H PRN  fentanNYL, 12.5 mcg, Q2H PRN  dextrose, 12.5 g, PRN  dextrose 5 % and 0.45 % NaCl, , Continuous PRN  potassium chloride, 10 mEq, PRN  magnesium sulfate, 2,000 mg, PRN  sodium phosphate IVPB, 10 mmol, PRN         Objective:    BP (!) 170/111   Pulse 93   Temp 97.7 °F (36.5 °C) (Oral)   Resp 20   Ht 5' 4\" (1.626 m)   Wt 136 lb 1.6 oz (61.7 kg)   SpO2 98%   BMI 23.36 kg/m²   General Appearance: alert and oriented to person, place and time and in no acute distress  Skin: warm and dry  Head: normocephalic and atraumatic  Eyes: pupils equal, round, and reactive to light, extraocular eye movements intact, conjunctivae normal  Neck: neck supple and non tender without mass   Pulmonary/Chest: clear to auscultation bilaterally- no wheezes, rales or rhonchi, normal air movement, no respiratory distress  Cardiovascular: normal rate, normal S1 and S2 and no carotid bruits  Abdomen: soft, mild epigastric tenderness, non-distended, normal bowel sounds   Extremities: no cyanosis, no clubbing and no edema  Neurologic: no cranial nerve deficit and speech normal      Recent Labs     07/10/21  2312 07/11/21  0535 07/12/21  0509    135 139   K 3.4* 3.8 3.5    99 100   CO2 25 22 24   BUN 36* 31* 33*   CREATININE 7.3* 7.0* 7.4*   GLUCOSE 116* 158* 186*   CALCIUM 8.1* 8.7 8.7       Recent Labs     07/09/21 2010   ALKPHOS 190*   PROT 6.1*   LABALBU 3.4*   BILITOT 0.2   AST 30   ALT 22       Recent Labs     07/09/21 2010 07/11/21  0535 07/12/21  0509   WBC 7.0 5.5 5.4   RBC 3.41* 2.91* 2.64*   HGB 10.6* 9.1* 8.4*   HCT 32.0* 27.3* 25.2*   MCV 93.8 93.8 95.5   MCH 31.1 31.3 31.8   MCHC 33.1 33.3 33.3   RDW 15.8* 15.3* 15.5*    233 184   MPV 9.3 9.6 10.1        Radiology:   XR CHEST PORTABLE   Final Result   No evidence of active cardiopulmonary pathology. CT ABDOMEN PELVIS WO CONTRAST Additional Contrast? None   Final Result   No evidence of acute abdominal or pelvic pathology. Peritoneal fluid likely related to peritoneal dialysis with dialysis catheter   in the right side of the pelvis. Small hiatal hernia. Large amount of stool in the colon which may reflect constipation.              Assessment:  Principal Problem:    Lactic acid acidosis  Active Problems:    ESRD on peritoneal dialysis ([facility]    Hypertension    Hypothyroidism    Diabetic ketoacidosis without coma associated with type 1 diabetes mellitus ([facility]    Iron deficiency anemia    Hyperlipidemia    Major depressive disorder  Resolved Problems:    * No resolved hospital problems. *      Plan:    1. DKA, type I DM, uncontrolled - was transferred to ICU for uncontrolled BG >500  and elevated lactic acid - blood glucose now better controlled - insulin drip discontinued - beta hydroxy was elevated - A1c 8.8 - most likely will be transferred to medical floor     2. Hypertension- uncontrolled - despite resuming home dose antihypertensive agents. Hydralazine increased to 50 mg tid. Coreg started today replacing metoprolol. 3. Persistent nausea/epigastric pain with early satiety--patient on Protonix 40 mg daily. Will consult GI for consideration of EGD. Last had on 10/2019 which was essentially wnl.     4. VRE UTI form last admission- on oral Linezolid from last admission and is too complete 2 week course from 7/2     5. Diarrhea - no diarrhea therefore doubt C diff. She has chronic loose stool for which she often takes loperamide fore.     6. ESRD on PD - nephrology following - exchanges will be ordered by renal      7. Hypothyroidism - Levothyroxine      8. Hyperlipidemia - healthy diet      9. Iron deficiency anemia - H&H stable     10. Major depressive disorder- pleasant - no changes made to current regimen       NOTE: This report was transcribed using voice recognition software. Every effort was made to ensure accuracy; however, inadvertent computerized transcription errors may be present.      Electronically signed by Kena Peña MD on 7/12/2021 at 10:20 AM

## 2021-07-12 NOTE — PROGRESS NOTES
Pulmonary/Critical Care Progress Note    We are following patient for DKA, CKD on peritoneal dialysis, recent vancomycin-resistant endocarditis, UTI, hypertension, chronic anemia, diabetes mellitus type 1    SUBJECTIVE:  Patient remains a bit lethargic but this is not uncommon when she is hospitalized. I believe that she is more awake than usual.  She is not having any respiratory difficulty and finished her peritoneal dialysis session last night without incident. Her BUN this morning is 33 with a creatinine of 7.4. TSH is elevated at 11.50. It may be that she is not taking her levothyroxine regularly or that she needs an increased dose. It is also possible that she has not been taking it on an empty stomach so she is not getting the full effect of it. Anion gap is slightly elevated but this is very possibly secondary to acidosis from kidney failure and not ketoacidosis. We will repeat a beta hydroxybutyric acid level.     MEDICATIONS:   insulin lispro  0-3 Units Subcutaneous Nightly    hydrALAZINE  50 mg Oral 3 times per day    carvedilol  6.25 mg Oral BID WC    ARIPiprazole  5 mg Oral Nightly    insulin glargine  7 Units Subcutaneous Daily    levothyroxine  75 mcg Oral Daily    linezolid  600 mg Oral 2 times per day    mirtazapine  15 mg Oral Nightly    pantoprazole  40 mg Oral QAM AC    rOPINIRole  0.25 mg Oral Nightly    sevelamer  800 mg Oral TID WC    sodium chloride flush  10 mL Intravenous 2 times per day    heparin (porcine)  5,000 Units Subcutaneous 3 times per day    insulin lispro  0-6 Units Subcutaneous TID WC    famotidine (PEPCID) injection  10 mg Intravenous BID    epoetin nena-epbx  3,000 Units Subcutaneous Once per day on Mon Wed Fri      dextrose      sodium chloride      dextrose 5 % and 0.45 % NaCl 150 mL/hr at 07/10/21 1441    [Held by provider] insulin 1.08 Units/hr (07/10/21 1553)    [Held by provider] dextrose 100 mL/hr at 07/10/21 1711     calcium carbonate, glucose, glucagon (rDNA), dextrose, sodium chloride flush, sodium chloride, polyethylene glycol, acetaminophen **OR** acetaminophen, prochlorperazine, hydrALAZINE, fentanNYL, dextrose, dextrose 5 % and 0.45 % NaCl, potassium chloride, magnesium sulfate, sodium phosphate IVPB      REVIEW OF SYSTEMS:  Constitutional: Denies fever, weight loss, night sweats, and fatigue  Skin: Denies pigmentation, dark lesions, and rashes   HEENT: Denies hearing loss, tinnitus, ear drainage, epistaxis, sore throat, and hoarseness. Cardiovascular: Denies palpitations, chest pain, and chest pressure. Respiratory: Denies cough, dyspnea at rest, hemoptysis, apnea, and choking. Gastrointestinal: Denies nausea, vomiting, poor appetite, diarrhea, heartburn or reflux  Genitourinary: Denies dysuria, frequency, urgency or hematuria  Musculoskeletal: Denies myalgias, muscle weakness, and bone pain  Neurological: Denies dizziness, vertigo, headache, and focal weakness  Psychological: Denies anxiety and depression  Endocrine: Denies heat intolerance and cold intolerance  Hematopoietic/Lymphatic: Denies bleeding problems and blood transfusions    OBJECTIVE:  Vitals:    07/12/21 0800   BP: (!) 170/111   Pulse: 93   Resp: 20   Temp: 97.7 °F (36.5 °C)   SpO2: 98%           O2 Device: None (Room air)    PHYSICAL EXAM:  Constitutional: No fever, chills, diaphoresis  Skin: Skin rash, no skin breakdown  HEENT: Unremarkable  Neck: No JVD, lymphadenopathy, thyromegaly  Cardiovascular: S1, S2 normal.  No S3 murmurs or rubs present  Respiratory: Clear to auscultation bilaterally, no wheezing or pleural rubs heard  Gastrointestinal: Soft, nontender. Peritoneal dialysis catheter in place. Nondistended. Genitourinary: No CVA tenderness  Extremities: No clubbing, cyanosis, or edema  Neurological: Awake, alert, oriented x3.   No evidence of focal motor or sensory deficits  Psychological: Depressed affect which is probably her baseline    LABS:  WBC   Date Value Ref Range Status   07/12/2021 5.4 4.5 - 11.5 E9/L Final   07/11/2021 5.5 4.5 - 11.5 E9/L Final   07/09/2021 7.0 4.5 - 11.5 E9/L Final     Hemoglobin   Date Value Ref Range Status   07/12/2021 8.4 (L) 11.5 - 15.5 g/dL Final   07/11/2021 9.1 (L) 11.5 - 15.5 g/dL Final   07/09/2021 10.6 (L) 11.5 - 15.5 g/dL Final     Hematocrit   Date Value Ref Range Status   07/12/2021 25.2 (L) 34.0 - 48.0 % Final   07/11/2021 27.3 (L) 34.0 - 48.0 % Final   07/09/2021 32.0 (L) 34.0 - 48.0 % Final     MCV   Date Value Ref Range Status   07/12/2021 95.5 80.0 - 99.9 fL Final   07/11/2021 93.8 80.0 - 99.9 fL Final   07/09/2021 93.8 80.0 - 99.9 fL Final     Platelets   Date Value Ref Range Status   07/12/2021 184 130 - 450 E9/L Final   07/11/2021 233 130 - 450 E9/L Final   07/09/2021 327 130 - 450 E9/L Final     Sodium   Date Value Ref Range Status   07/12/2021 139 132 - 146 mmol/L Final   07/11/2021 135 132 - 146 mmol/L Final   07/10/2021 137 132 - 146 mmol/L Final     Potassium   Date Value Ref Range Status   07/12/2021 3.5 3.5 - 5.0 mmol/L Final   07/10/2021 3.4 (L) 3.5 - 5.0 mmol/L Final   07/10/2021 3.5 3.5 - 5.0 mmol/L Final     Potassium reflex Magnesium   Date Value Ref Range Status   07/11/2021 3.8 3.5 - 5.0 mmol/L Final   06/22/2021 4.0 3.5 - 5.0 mmol/L Final   06/21/2021 5.6 (H) 3.5 - 5.0 mmol/L Final     Comment:     Specimen is moderately Hemolyzed. Result may be artificially increased.      Chloride   Date Value Ref Range Status   07/12/2021 100 98 - 107 mmol/L Final   07/11/2021 99 98 - 107 mmol/L Final   07/10/2021 100 98 - 107 mmol/L Final     CO2   Date Value Ref Range Status   07/12/2021 24 22 - 29 mmol/L Final   07/11/2021 22 22 - 29 mmol/L Final   07/10/2021 25 22 - 29 mmol/L Final     BUN   Date Value Ref Range Status   07/12/2021 33 (H) 6 - 20 mg/dL Final   07/11/2021 31 (H) 6 - 20 mg/dL Final   07/10/2021 36 (H) 6 - 20 mg/dL Final     CREATININE   Date Value Ref Range Status   07/12/2021 7.4 (H) 0.5 - 1.0 mg/dL Final   07/11/2021 7.0 (H) 0.5 - 1.0 mg/dL Final   07/10/2021 7.3 (H) 0.5 - 1.0 mg/dL Final     Glucose   Date Value Ref Range Status   07/12/2021 186 (H) 74 - 99 mg/dL Final   07/11/2021 158 (H) 74 - 99 mg/dL Final   07/10/2021 116 (H) 74 - 99 mg/dL Final   05/18/2012 130 (H) 70 - 110 mg/dL Final   04/26/2012 61 (L) 70 - 110 mg/dL Final   04/25/2012 196 (H) 70 - 110 mg/dL Final     Calcium   Date Value Ref Range Status   07/12/2021 8.7 8.6 - 10.2 mg/dL Final   07/11/2021 8.7 8.6 - 10.2 mg/dL Final   07/10/2021 8.1 (L) 8.6 - 10.2 mg/dL Final     Total Protein   Date Value Ref Range Status   07/09/2021 6.1 (L) 6.4 - 8.3 g/dL Final   07/02/2021 5.8 (L) 6.4 - 8.3 g/dL Final   07/01/2021 6.3 (L) 6.4 - 8.3 g/dL Final     Albumin   Date Value Ref Range Status   07/09/2021 3.4 (L) 3.5 - 5.2 g/dL Final   07/02/2021 3.3 (L) 3.5 - 5.2 g/dL Final   07/01/2021 3.7 3.5 - 5.2 g/dL Final   05/18/2012 4.1 3.2 - 4.8 g/dL Final   04/23/2012 4.0 3.2 - 4.8 g/dL Final   04/02/2012 4.8 3.2 - 4.8 g/dL Final     Total Bilirubin   Date Value Ref Range Status   07/09/2021 0.2 0.0 - 1.2 mg/dL Final   07/02/2021 <0.2 0.0 - 1.2 mg/dL Final   07/01/2021 <0.2 0.0 - 1.2 mg/dL Final     Alkaline Phosphatase   Date Value Ref Range Status   07/09/2021 190 (H) 35 - 104 U/L Final   07/02/2021 142 (H) 35 - 104 U/L Final   07/01/2021 165 (H) 35 - 104 U/L Final     AST   Date Value Ref Range Status   07/09/2021 30 0 - 31 U/L Final     Comment:     Specimen is slightly Hemolyzed. Result may be artificially increased. 07/02/2021 53 (H) 0 - 31 U/L Final     Comment:     Specimen is moderately Hemolyzed. Result may be artificially increased. 07/01/2021 84 (H) 0 - 31 U/L Final     Comment:     Specimen is slightly Hemolyzed. Result may be artificially increased.      ALT   Date Value Ref Range Status   07/09/2021 22 0 - 32 U/L Final   07/02/2021 50 (H) 0 - 32 U/L Final   07/01/2021 54 (H) 0 - 32 U/L Final     GFR Non-   Date Value Ref Range Status   07/12/2021 8 >=60 mL/min/1.73 Final     Comment:     Chronic Kidney Disease: less than 60 ml/min/1.73 sq.m. Kidney Failure: less than 15 ml/min/1.73 sq.m. Results valid for patients 18 years and older. 07/11/2021 8 >=60 mL/min/1.73 Final     Comment:     Chronic Kidney Disease: less than 60 ml/min/1.73 sq.m. Kidney Failure: less than 15 ml/min/1.73 sq.m. Results valid for patients 18 years and older. 07/10/2021 8 >=60 mL/min/1.73 Final     Comment:     Chronic Kidney Disease: less than 60 ml/min/1.73 sq.m. Kidney Failure: less than 15 ml/min/1.73 sq.m. Results valid for patients 18 years and older. GFR    Date Value Ref Range Status   07/12/2021 8  Final   07/11/2021 8  Final   07/10/2021 8  Final     Magnesium   Date Value Ref Range Status   07/12/2021 1.8 1.6 - 2.6 mg/dL Final   07/10/2021 2.0 1.6 - 2.6 mg/dL Final   07/10/2021 1.9 1.6 - 2.6 mg/dL Final     Phosphorus   Date Value Ref Range Status   07/12/2021 4.6 (H) 2.5 - 4.5 mg/dL Final   07/11/2021 4.3 2.5 - 4.5 mg/dL Final   07/10/2021 4.1 2.5 - 4.5 mg/dL Final     Recent Labs     07/09/21 2025   PH 7.430   PO2 81.1   PCO2 43.3   HCO3 28.1*   BE 3.4*   O2SAT 95.7       RADIOLOGY:  XR CHEST PORTABLE   Final Result   No evidence of active cardiopulmonary pathology. CT ABDOMEN PELVIS WO CONTRAST Additional Contrast? None   Final Result   No evidence of acute abdominal or pelvic pathology. Peritoneal fluid likely related to peritoneal dialysis with dialysis catheter   in the right side of the pelvis. Small hiatal hernia. Large amount of stool in the colon which may reflect constipation.                  PROBLEM LIST:  Principal Problem:    Lactic acid acidosis  Active Problems:    Diabetic ketoacidosis without coma associated with type 1 diabetes mellitus (HCC)    Hypertension    Iron deficiency anemia    ESRD on peritoneal dialysis [facility])    Hypothyroidism Hyperlipidemia    Major depressive disorder  Resolved Problems:    * No resolved hospital problems. *      IMPRESSION:  1. DKA, resolving  2. Chronic kidney disease on peritoneal dialysis  3. Depression  4. Hypertension  5. Hyperlipidemia  6. Anemia  7. Hypothyroidism    PLAN:  1. Continue peritoneal dialysis as scheduled  2. Increase diet  3. Consider increasing Synthroid dose  4. Make sure she is taking her home Synthroid regularly and on an empty stomach  5. She is a reasonable candidate to be transferred to a monitored floor  6. Adjust antihypertensive medicines as required to keep her blood pressure under reasonable control    ATTESTATION:  ICU Staff Physician note of personal involvement in Care  As the attending physician, I certify that I personally reviewed the patients history and personally examined the patient to confirm the physical findings described above,  And that I reviewed the relevant imaging studies and available reports. I also discussed the differential diagnosis and all of the proposed management plans with the patient and individuals accompanying the patient to this visit. They had the opportunity to ask questions about the proposed management plans and to have those questions answered. This patient has a high probability of sudden, clinically significant deterioration, which requires the highest level of physician preparedness to intervene urgently. I managed/supervised life or organ supporting interventions that required frequent physician assessment. I devoted my full attention to the direct care of this patient for the amount of time indicated below. Time I spent with the family or surrogate(s) is included only if the patient was incapable of providing the necessary information or participating in medical decisions - Time devoted to teaching and to any procedures I billed separately is not included.     CRITICAL CARE TIME:  32 minutes    Electronically signed by Ivy Márquez MD on 7/12/2021 at 10:07 AM

## 2021-07-12 NOTE — FLOWSHEET NOTE
CCPD session completed at this time. Pt tolerated well. 1007 total U/F. Aseptic practice maintained t/o procedure. Effluent product clear translucent yellow in nature. No notable particulates. Fibrin,  Mucous shreds [resent.

## 2021-07-12 NOTE — CARE COORDINATION
7/12/21 Negative covid 7/10/21. Cm transition of care:    Icu/insulin gtt/on/off. Pt peritoneal dialysis at home and cycles this during the night. [employer] supplies needed equipment for PD. She lives with her children and her mom. pts mom is an RN and assists with all care. Pt currently has no HHC. Pt was on [employer] pta. CM to continue to follow for discharge needs.  Electronically signed by TOR Kumar on 7/12/2021 at 9:43 AM      Readmission Assessment  Number of Days since last admission?: 8-30 days  Previous Disposition: Home with Family (home pd with [employer] supplies- mom is an RN)  Who is being Interviewed: Patient  What was the patient's/caregiver's perception as to why they think they needed to return back to the hospital?:  (\" i couldn't keep anything down\")  Did you visit your Primary Care Physician after you left the hospital, before you returned this time?: No (that was two months ago Dr. Yao Parks)  Why weren't you able to visit your PCP?: Did not have an appointment  Did you see a specialist, such as Cardiac, Pulmonary, Orthopedic Physician, etc. after you left the hospital?: No  Who advised the patient to return to the hospital?: Self-referral  Does the patient report anything that got in the way of taking their medications?: Yes  What reasons did they give?: Other (Comment) (nausea)  In our efforts to provide the best possible care to you and others like you, can you think of anything that we could have done to help you after you left the hospital the first time, so that you might not have needed to return so soon?: Arrange for more help when leaving the hospital    Electronically signed by TOR Kumar on 7/12/2021 at 9:25 AM

## 2021-07-13 ENCOUNTER — APPOINTMENT (OUTPATIENT)
Dept: NUCLEAR MEDICINE | Age: 31
DRG: 420 | End: 2021-07-13
Payer: COMMERCIAL

## 2021-07-13 LAB
ANION GAP SERPL CALCULATED.3IONS-SCNC: 13 MMOL/L (ref 7–16)
BUN BLDV-MCNC: 28 MG/DL (ref 6–20)
CALCIUM SERPL-MCNC: 8.8 MG/DL (ref 8.6–10.2)
CHLORIDE BLD-SCNC: 100 MMOL/L (ref 98–107)
CO2: 24 MMOL/L (ref 22–29)
CREAT SERPL-MCNC: 6.8 MG/DL (ref 0.5–1)
GFR AFRICAN AMERICAN: 9
GFR NON-AFRICAN AMERICAN: 9 ML/MIN/1.73
GLUCOSE BLD-MCNC: 198 MG/DL (ref 74–99)
METER GLUCOSE: 199 MG/DL (ref 74–99)
METER GLUCOSE: 204 MG/DL (ref 74–99)
METER GLUCOSE: 244 MG/DL (ref 74–99)
METER GLUCOSE: 289 MG/DL (ref 74–99)
ORGANISM: ABNORMAL
POTASSIUM REFLEX MAGNESIUM: 3.9 MMOL/L (ref 3.5–5)
SODIUM BLD-SCNC: 137 MMOL/L (ref 132–146)
URINE CULTURE, ROUTINE: ABNORMAL

## 2021-07-13 PROCEDURE — 78264 GASTRIC EMPTYING IMG STUDY: CPT

## 2021-07-13 PROCEDURE — 6360000002 HC RX W HCPCS: Performed by: HOSPITALIST

## 2021-07-13 PROCEDURE — 90945 DIALYSIS ONE EVALUATION: CPT

## 2021-07-13 PROCEDURE — 99232 SBSQ HOSP IP/OBS MODERATE 35: CPT | Performed by: INTERNAL MEDICINE

## 2021-07-13 PROCEDURE — 6370000000 HC RX 637 (ALT 250 FOR IP): Performed by: INTERNAL MEDICINE

## 2021-07-13 PROCEDURE — 80048 BASIC METABOLIC PNL TOTAL CA: CPT

## 2021-07-13 PROCEDURE — 82962 GLUCOSE BLOOD TEST: CPT

## 2021-07-13 PROCEDURE — C9113 INJ PANTOPRAZOLE SODIUM, VIA: HCPCS | Performed by: HOSPITALIST

## 2021-07-13 PROCEDURE — A9541 TC99M SULFUR COLLOID: HCPCS | Performed by: RADIOLOGY

## 2021-07-13 PROCEDURE — 2580000003 HC RX 258: Performed by: HOSPITALIST

## 2021-07-13 PROCEDURE — 2500000003 HC RX 250 WO HCPCS: Performed by: INTERNAL MEDICINE

## 2021-07-13 PROCEDURE — 2580000003 HC RX 258: Performed by: INTERNAL MEDICINE

## 2021-07-13 PROCEDURE — 51798 US URINE CAPACITY MEASURE: CPT

## 2021-07-13 PROCEDURE — 6360000002 HC RX W HCPCS: Performed by: INTERNAL MEDICINE

## 2021-07-13 PROCEDURE — 6370000000 HC RX 637 (ALT 250 FOR IP): Performed by: NURSE PRACTITIONER

## 2021-07-13 PROCEDURE — 1200000000 HC SEMI PRIVATE

## 2021-07-13 PROCEDURE — 6370000000 HC RX 637 (ALT 250 FOR IP): Performed by: HOSPITALIST

## 2021-07-13 PROCEDURE — 3430000000 HC RX DIAGNOSTIC RADIOPHARMACEUTICAL: Performed by: RADIOLOGY

## 2021-07-13 PROCEDURE — 36415 COLL VENOUS BLD VENIPUNCTURE: CPT

## 2021-07-13 RX ORDER — METOPROLOL TARTRATE 5 MG/5ML
5 INJECTION INTRAVENOUS ONCE
Status: COMPLETED | OUTPATIENT
Start: 2021-07-13 | End: 2021-07-13

## 2021-07-13 RX ORDER — LOPERAMIDE HYDROCHLORIDE 2 MG/1
4 CAPSULE ORAL 2 TIMES DAILY PRN
Status: DISCONTINUED | OUTPATIENT
Start: 2021-07-13 | End: 2021-07-15 | Stop reason: HOSPADM

## 2021-07-13 RX ORDER — FENTANYL CITRATE 50 UG/ML
25 INJECTION, SOLUTION INTRAMUSCULAR; INTRAVENOUS ONCE
Status: COMPLETED | OUTPATIENT
Start: 2021-07-13 | End: 2021-07-13

## 2021-07-13 RX ORDER — CEFDINIR 300 MG/1
300 CAPSULE ORAL DAILY
Status: DISCONTINUED | OUTPATIENT
Start: 2021-07-13 | End: 2021-07-15 | Stop reason: HOSPADM

## 2021-07-13 RX ADMIN — SEVELAMER CARBONATE 800 MG: 800 TABLET, FILM COATED ORAL at 13:33

## 2021-07-13 RX ADMIN — LEVOTHYROXINE SODIUM 75 MCG: 75 TABLET ORAL at 13:36

## 2021-07-13 RX ADMIN — LOPERAMIDE HYDROCHLORIDE 4 MG: 2 CAPSULE ORAL at 21:25

## 2021-07-13 RX ADMIN — FENTANYL CITRATE 12.5 MCG: 50 INJECTION INTRAMUSCULAR; INTRAVENOUS at 15:36

## 2021-07-13 RX ADMIN — SEVELAMER CARBONATE 800 MG: 800 TABLET, FILM COATED ORAL at 17:58

## 2021-07-13 RX ADMIN — SODIUM CHLORIDE, PRESERVATIVE FREE 10 ML: 5 INJECTION INTRAVENOUS at 11:06

## 2021-07-13 RX ADMIN — ROPINIROLE HYDROCHLORIDE 0.25 MG: 0.25 TABLET, FILM COATED ORAL at 21:24

## 2021-07-13 RX ADMIN — FENTANYL CITRATE 12.5 MCG: 50 INJECTION INTRAMUSCULAR; INTRAVENOUS at 20:43

## 2021-07-13 RX ADMIN — MIRTAZAPINE 15 MG: 15 TABLET, FILM COATED ORAL at 21:25

## 2021-07-13 RX ADMIN — LINEZOLID 600 MG: 600 TABLET, FILM COATED ORAL at 21:24

## 2021-07-13 RX ADMIN — FENTANYL CITRATE 12.5 MCG: 50 INJECTION INTRAMUSCULAR; INTRAVENOUS at 05:09

## 2021-07-13 RX ADMIN — INSULIN LISPRO 1 UNITS: 100 INJECTION, SOLUTION INTRAVENOUS; SUBCUTANEOUS at 21:25

## 2021-07-13 RX ADMIN — CARVEDILOL 6.25 MG: 6.25 TABLET, FILM COATED ORAL at 13:34

## 2021-07-13 RX ADMIN — Medication 10 ML: at 11:06

## 2021-07-13 RX ADMIN — METOPROLOL TARTRATE 5 MG: 5 INJECTION INTRAVENOUS at 16:32

## 2021-07-13 RX ADMIN — SODIUM CHLORIDE, PRESERVATIVE FREE 10 ML: 5 INJECTION INTRAVENOUS at 21:24

## 2021-07-13 RX ADMIN — PANTOPRAZOLE SODIUM 40 MG: 40 INJECTION, POWDER, FOR SOLUTION INTRAVENOUS at 21:24

## 2021-07-13 RX ADMIN — ARIPIPRAZOLE 5 MG: 5 TABLET ORAL at 21:24

## 2021-07-13 RX ADMIN — INSULIN LISPRO 1 UNITS: 100 INJECTION, SOLUTION INTRAVENOUS; SUBCUTANEOUS at 18:02

## 2021-07-13 RX ADMIN — FENTANYL CITRATE 25 MCG: 50 INJECTION INTRAMUSCULAR; INTRAVENOUS at 13:28

## 2021-07-13 RX ADMIN — CARVEDILOL 6.25 MG: 6.25 TABLET, FILM COATED ORAL at 17:58

## 2021-07-13 RX ADMIN — FENTANYL CITRATE 12.5 MCG: 50 INJECTION INTRAMUSCULAR; INTRAVENOUS at 22:46

## 2021-07-13 RX ADMIN — CEFDINIR 300 MG: 300 CAPSULE ORAL at 14:00

## 2021-07-13 RX ADMIN — FENTANYL CITRATE 12.5 MCG: 50 INJECTION INTRAMUSCULAR; INTRAVENOUS at 18:27

## 2021-07-13 RX ADMIN — PANTOPRAZOLE SODIUM 40 MG: 40 INJECTION, POWDER, FOR SOLUTION INTRAVENOUS at 10:56

## 2021-07-13 RX ADMIN — SUCRALFATE 1 G: 1 TABLET ORAL at 13:33

## 2021-07-13 RX ADMIN — Medication 10 ML: at 21:24

## 2021-07-13 RX ADMIN — FENTANYL CITRATE 12.5 MCG: 50 INJECTION INTRAMUSCULAR; INTRAVENOUS at 01:16

## 2021-07-13 RX ADMIN — HYDRALAZINE HYDROCHLORIDE 50 MG: 50 TABLET ORAL at 13:35

## 2021-07-13 RX ADMIN — SUCRALFATE 1 G: 1 TABLET ORAL at 18:01

## 2021-07-13 RX ADMIN — Medication 2 MILLICURIE: at 08:22

## 2021-07-13 RX ADMIN — FENTANYL CITRATE 12.5 MCG: 50 INJECTION INTRAMUSCULAR; INTRAVENOUS at 10:56

## 2021-07-13 RX ADMIN — FENTANYL CITRATE 12.5 MCG: 50 INJECTION INTRAMUSCULAR; INTRAVENOUS at 08:02

## 2021-07-13 RX ADMIN — HYDRALAZINE HYDROCHLORIDE 50 MG: 50 TABLET ORAL at 21:24

## 2021-07-13 RX ADMIN — LINEZOLID 600 MG: 600 TABLET, FILM COATED ORAL at 14:00

## 2021-07-13 RX ADMIN — INSULIN LISPRO 3 UNITS: 100 INJECTION, SOLUTION INTRAVENOUS; SUBCUTANEOUS at 13:38

## 2021-07-13 ASSESSMENT — PAIN SCALES - GENERAL
PAINLEVEL_OUTOF10: 8
PAINLEVEL_OUTOF10: 5
PAINLEVEL_OUTOF10: 8
PAINLEVEL_OUTOF10: 0
PAINLEVEL_OUTOF10: 8
PAINLEVEL_OUTOF10: 4
PAINLEVEL_OUTOF10: 5
PAINLEVEL_OUTOF10: 10

## 2021-07-13 ASSESSMENT — PAIN DESCRIPTION - ORIENTATION: ORIENTATION: OTHER (COMMENT)

## 2021-07-13 ASSESSMENT — PAIN DESCRIPTION - LOCATION
LOCATION: GENERALIZED
LOCATION: GENERALIZED

## 2021-07-13 ASSESSMENT — PAIN DESCRIPTION - PAIN TYPE
TYPE: CHRONIC PAIN
TYPE: CHRONIC PAIN

## 2021-07-13 ASSESSMENT — PAIN - FUNCTIONAL ASSESSMENT: PAIN_FUNCTIONAL_ASSESSMENT: PREVENTS OR INTERFERES SOME ACTIVE ACTIVITIES AND ADLS

## 2021-07-13 ASSESSMENT — PAIN DESCRIPTION - ONSET: ONSET: ON-GOING

## 2021-07-13 ASSESSMENT — PAIN DESCRIPTION - FREQUENCY: FREQUENCY: CONTINUOUS

## 2021-07-13 ASSESSMENT — PAIN DESCRIPTION - DESCRIPTORS
DESCRIPTORS: ACHING;CONSTANT
DESCRIPTORS: ACHING;CONSTANT

## 2021-07-13 ASSESSMENT — PAIN DESCRIPTION - PROGRESSION: CLINICAL_PROGRESSION: NOT CHANGED

## 2021-07-13 NOTE — PROGRESS NOTES
[address] [place], [place] [address]   Progress Note    Admitting Date and Time: 7/9/2021  7:55 PM  Admit Dx: Lactic acid acidosis [E87.2]    Subjective:    7/11: Pt feels a little better, today she is more verbal than yesterday. Patient complains of headache, which she blames on her elevated blood pressure. 7/12:  Patient complaining that stomach is burning all the time. She states it is hard to eat. 7/13:       ROS: denies fever, chills, cp, sob, n/v, HA unless stated above.      pantoprazole  40 mg Intravenous BID    And    sodium chloride (PF)  10 mL Intravenous BID    sucralfate  1 g Oral 4 times per day    polyethylene glycol  17 g Oral BID    insulin lispro  0-3 Units Subcutaneous Nightly    hydrALAZINE  50 mg Oral 3 times per day    carvedilol  6.25 mg Oral BID WC    ARIPiprazole  5 mg Oral Nightly    insulin glargine  7 Units Subcutaneous Daily    levothyroxine  75 mcg Oral Daily    linezolid  600 mg Oral 2 times per day    mirtazapine  15 mg Oral Nightly    rOPINIRole  0.25 mg Oral Nightly    sevelamer  800 mg Oral TID WC    sodium chloride flush  10 mL Intravenous 2 times per day    heparin (porcine)  5,000 Units Subcutaneous 3 times per day    insulin lispro  0-6 Units Subcutaneous TID WC    epoetin nena-epbx  3,000 Units Subcutaneous Once per day on Mon Wed Fri     calcium carbonate, 500 mg, TID PRN  glucose, 15 g, PRN  glucagon (rDNA), 1 mg, PRN  dextrose, 100 mL/hr, PRN  sodium chloride flush, 10 mL, PRN  sodium chloride, 25 mL, PRN  polyethylene glycol, 17 g, Daily PRN  acetaminophen, 650 mg, Q6H PRN   Or  acetaminophen, 650 mg, Q6H PRN  prochlorperazine, 10 mg, Q6H PRN  hydrALAZINE, 20 mg, Q6H PRN  fentanNYL, 12.5 mcg, Q2H PRN  dextrose, 12.5 g, PRN  dextrose 5 % and 0.45 % NaCl, , Continuous PRN  potassium chloride, 10 mEq, PRN  magnesium sulfate, 2,000 mg, PRN  sodium phosphate IVPB, 10 mmol, PRN         Objective:    BP (!) 150/76   Pulse 108   Temp 98 °F (36.7 °C) (Oral)   Resp 18   Ht 5' 4\" (1.626 m)   Wt 151 lb 0.2 oz (68.5 kg)   SpO2 100%   BMI 25.92 kg/m²   General Appearance: alert and oriented to person, place and time and in no acute distress  Skin: warm and dry  Head: normocephalic and atraumatic  Eyes: pupils equal, round, and reactive to light, extraocular eye movements intact, conjunctivae normal  Neck: neck supple and non tender without mass   Pulmonary/Chest: clear to auscultation bilaterally- no wheezes, rales or rhonchi, normal air movement, no respiratory distress  Cardiovascular: normal rate, normal S1 and S2 and no carotid bruits  Abdomen: soft, mild epigastric tenderness, non-distended, normal bowel sounds   Extremities: no cyanosis, no clubbing and no edema  Neurologic: no cranial nerve deficit and speech normal      Recent Labs     07/11/21  0535 07/12/21  0509 07/13/21  0747    139 137   K 3.8 3.5 3.9   CL 99 100 100   CO2 22 24 24   BUN 31* 33* 28*   CREATININE 7.0* 7.4* 6.8*   GLUCOSE 158* 186* 198*   CALCIUM 8.7 8.7 8.8       No results for input(s): ALKPHOS, PROT, LABALBU, BILITOT, AST, ALT in the last 72 hours.     Recent Labs     07/11/21  0535 07/12/21  0509   WBC 5.5 5.4   RBC 2.91* 2.64*   HGB 9.1* 8.4*   HCT 27.3* 25.2*   MCV 93.8 95.5   MCH 31.3 31.8   MCHC 33.3 33.3   RDW 15.3* 15.5*    184   MPV 9.6 10.1        Culture, Urine [[id number]] (Abnormal)  Collected: 07/11/21 1627     Specimen: Urine, clean catch Updated: 07/13/21 0746      Organism Serratia marcescens      Urine Culture, Routine 75,000 CFU/ml     Narrative:       Source: URINE       Site:                7/13/2021  7:46 AM - Chris, Miranda Incoming Results From [facility]    Specimen Information: Urine, clean catch        Component Collected Lab   Organism Abnormal  07/11/2021  4:27 PM  - [facility]   Serratia marcescens    Urine Culture, Routine 07/11/2021  4:27 PM EDUARD Sidhu   75,000 CFU/ml    Testing Performed By    Lab - Abbreviation Name Director Address Valid Date Range   [facility] - [address] [facility] Huber Reyes MD [address] [place] [address]. [address] [place] [address] 12/03/19 1502-Present   Narrative  Performed by: [address] [place] [address] Lab  Source: URINE       Site:              Susceptibility    Serratia marcescens (1)    Antibiotic Interpretation DEENA Status    ceFAZolin Resistant >=^64 mcg/mL     cefepime Sensitive <=^0.12 mcg/mL     cefTRIAXone Sensitive <=^0.25 mcg/mL     ertapenem Sensitive <=^0.12 mcg/mL     gentamicin Sensitive <=^1 mcg/mL     levofloxacin Sensitive <=^0.12 mcg/mL     nitrofurantoin Resistant ^128 mcg/mL     trimethoprim-sulfamethoxazole              Radiology:   XR CHEST PORTABLE   Final Result   No evidence of active cardiopulmonary pathology. CT ABDOMEN PELVIS WO CONTRAST Additional Contrast? None   Final Result   No evidence of acute abdominal or pelvic pathology. Peritoneal fluid likely related to peritoneal dialysis with dialysis catheter   in the right side of the pelvis. Small hiatal hernia. Large amount of stool in the colon which may reflect constipation. NM GASTRIC EMPTYING    (Results Pending)       Assessment:  Principal Problem:    Lactic acid acidosis  Active Problems:    ESRD on peritoneal dialysis ([address]    Hypertension    Hypothyroidism    Diabetic ketoacidosis without coma associated with type 1 diabetes mellitus (HCC)    Iron deficiency anemia    Hyperlipidemia    Major depressive disorder  Resolved Problems:    * No resolved hospital problems. *      Plan:    1. DKA, type I DM, uncontrolled - was transferred to ICU for uncontrolled BG >500  and elevated lactic acid - blood glucose now better controlled - insulin drip discontinued - beta hydroxy was elevated - A1c 8.8 - patient transferred to [facility] yesterday afternoon.     2. Hypertension- uncontrolled - despite resuming home dose antihypertensive agents.  Hydralazine increased to 50 mg tid. Coreg started Monday replacing metoprolol. 3. Persistent nausea/epigastric pain with early satiety--patient on Protonix 40 mg daily. Will consult GI for consideration of EGD. Last had on 10/2019 which was essentially wnl. GI increased IV PPI twice daily and order gastric emptying study today. If conservative measures fail, then possible inpatient EGD.     4. VRE UTI form last admission now with urine Cx growing Serratia marcens- on oral Linezolid from last admission and is too complete 2 week course from 7/2. Will check bladder scan for urinary retention but since having ongoing suprapubic tenderness, will treat with cefdinir 300 mg daily.     5. Diarrhea - no diarrhea therefore doubt C diff. She has chronic loose stool for which she often takes loperamide. Patient requested 4 mg daily as needed and this was ordered today.     6. ESRD on PD - nephrology following - exchanges will be ordered by renal      7. Hypothyroidism - Levothyroxine      8. Hyperlipidemia - healthy diet      9. Iron deficiency anemia - H&H stable     10. Major depressive disorder- pleasant - no changes made to current regimen       NOTE: This report was transcribed using voice recognition software. Every effort was made to ensure accuracy; however, inadvertent computerized transcription errors may be present.      Electronically signed by Tish Damon MD on 7/13/2021 at 11:31 AM

## 2021-07-13 NOTE — PROGRESS NOTES
Department of Internal Medicine  Nephrology Progress Note    Events reviewed. SUBJECTIVE:  We are following Wilton Flores for ESRD on PD. Complains of generalized pain this morning.     PHYSICAL EXAM:      Vitals:    VITALS:  BP (!) 150/76   Pulse 108   Temp 98 °F (36.7 °C) (Oral)   Resp 18   Ht 5' 4\" (1.626 m)   Wt 137 lb (62.1 kg)   SpO2 100%   BMI 23.52 kg/m²   24HR BLOOD PRESSURE RANGE:  Systolic (20FNK), TCF:845 , Min:126 , QQC:385   ; Diastolic (97WYB), LFP:34, Min:76, Max:99    PD Catheter Exam:  PD catheter exit site clean    Constitutional: Awake in no acute distress  HEENT:  Normocephalic, PERRL  Respiratory: Decreased breath sounds on the basis  Cardiovascular/Edema:  RRR, S1/S2  Gastrointestinal:  Soft, PD catheter exit site clean   Neurologic:  Nonfocal, AVELAR  Skin:  Warm, dry, no lesions  Other:  no edema    Scheduled Meds:   cefdinir  300 mg Oral Daily    fentanNYL  25 mcg Intravenous Once    pantoprazole  40 mg Intravenous BID    And    sodium chloride (PF)  10 mL Intravenous BID    sucralfate  1 g Oral 4 times per day    polyethylene glycol  17 g Oral BID    insulin lispro  0-3 Units Subcutaneous Nightly    hydrALAZINE  50 mg Oral 3 times per day    carvedilol  6.25 mg Oral BID WC    ARIPiprazole  5 mg Oral Nightly    insulin glargine  7 Units Subcutaneous Daily    levothyroxine  75 mcg Oral Daily    linezolid  600 mg Oral 2 times per day    mirtazapine  15 mg Oral Nightly    rOPINIRole  0.25 mg Oral Nightly    sevelamer  800 mg Oral TID WC    sodium chloride flush  10 mL Intravenous 2 times per day    heparin (porcine)  5,000 Units Subcutaneous 3 times per day    insulin lispro  0-6 Units Subcutaneous TID WC    epoetin nena-epbx  3,000 Units Subcutaneous Once per day on Mon Wed Fri     Continuous Infusions:   dextrose      sodium chloride      dextrose 5 % and 0.45 % NaCl 150 mL/hr at 07/10/21 1441    [Held by provider] insulin 1.08 Units/hr (07/10/21 1553)    [Held by provider] dextrose 100 mL/hr at 07/10/21 1711     PRN Meds:. loperamide, calcium carbonate, glucose, glucagon (rDNA), dextrose, sodium chloride flush, sodium chloride, polyethylene glycol, acetaminophen **OR** acetaminophen, prochlorperazine, hydrALAZINE, fentanNYL, dextrose, dextrose 5 % and 0.45 % NaCl, potassium chloride, magnesium sulfate, sodium phosphate IVPB    DATA:    CBC:   Lab Results   Component Value Date    WBC 5.4 07/12/2021    RBC 2.64 07/12/2021    HGB 8.4 07/12/2021    HCT 25.2 07/12/2021    MCV 95.5 07/12/2021    MCH 31.8 07/12/2021    MCHC 33.3 07/12/2021    RDW 15.5 07/12/2021     07/12/2021    MPV 10.1 07/12/2021     CMP:    Lab Results   Component Value Date     07/12/2021    K 3.5 07/12/2021    K 3.8 07/11/2021     07/12/2021    CO2 24 07/12/2021    BUN 33 07/12/2021    CREATININE 7.4 07/12/2021    GFRAA 8 07/12/2021    LABGLOM 8 07/12/2021    GLUCOSE 186 07/12/2021    GLUCOSE 130 05/18/2012    PROT 6.1 07/09/2021    LABALBU 3.4 07/09/2021    LABALBU 4.1 05/18/2012    CALCIUM 8.7 07/12/2021    BILITOT 0.2 07/09/2021    ALKPHOS 190 07/09/2021    AST 30 07/09/2021    ALT 22 07/09/2021     Radiology Review:      IMPRESSION/RECOMMENDATIONS:    Gerson Boyer is a 72-year-old female with history of ESRD on Peritoneal Dialysis, poorly controlled type I DM with multiple admissions for DKA, gastroparesis, HTN, 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 In the emergency room she was found to have a glucose level of 391, beta hydroxybutyrate of 2.49. She was admitted to MICU. 1. ESRD on peritoneal dialysis, to continue CCPD 4 exchanges over 10 hours of 1.5% with long dwell of 2.5%, tolerating well.  cc.   2. HTN, on hydralazine 25 mg three times daily and metoprolol 25 mg daily, BP running high today  3. Hx of UTI  4. MBD of CKD, on sevelamer  5. Anemia of CKD, on epoetin alpha 3000 units 3 times a week  6.  Nutrition, NPO for gastric emptying this morning     Plan:     · Continue CCPD 4 exchanges over 10 hours of 1.5% with long dwell of 2.5%  · Continue to monitor electrolytes daily  · Check urine culture   · Epoetin alpha 3000 units 3 times a week  · Replace potassium  · Monitor BP  · For gastric emptying study this morining       Electronically signed by HEBER Carbajal CNP on 7/13/2021 at 8:12 AM

## 2021-07-13 NOTE — FLOWSHEET NOTE
07/13/21 1624   Provider Notification   Reason for Communication Evaluate  (HR trending from high 90s to sustained 120s)   Provider Name BANNER BENOIT [facility]   Provider Notification Physician   Method of Communication Call   Response See orders   Notification Time 1625     IV Metoprolol 5mg x 1 now ordered.

## 2021-07-13 NOTE — PROGRESS NOTES
PROGRESS NOTE  By Erick Doshi M.D. [facility]  Dr. Tony Santiago M.D.,  Dr. Irwin Ramachandran M.D.,   Dr. Heri Alvarado D.O.,  Dr. Alysa Walker M.D.,  Dr. Terri Loving D.O.,  Lee Gerber D.O. Wilton Flores  36 y.o.  female    SUBJECTIVE:  No new complaints    OBJECTIVE:    BP (!) 150/76   Pulse 108   Temp 98 °F (36.7 °C) (Oral)   Resp 18   Ht 5' 4\" (1.626 m)   Wt 151 lb 0.2 oz (68.5 kg)   SpO2 100%   BMI 25.92 kg/m²     General: NAD/ female  HEENT: Anicteric sclera/moist oral mucosa  Neck: Supple/trachea midline  Chest: Symmetric excursion/nonlabored respirations  Cor: Regular  Abd.: Soft/nondistended  Extr.:  No significant peripheral edema  Skin: Warm and dry/anicteric        DATA:    Nuclear medicine gastric emptying study images reviewed/report pending-delayed gastric emptying noted. Lab Results   Component Value Date    WBC 5.4 07/12/2021    RBC 2.64 07/12/2021    HGB 8.4 07/12/2021    HCT 25.2 07/12/2021    MCV 95.5 07/12/2021    MCH 31.8 07/12/2021    MCHC 33.3 07/12/2021    RDW 15.5 07/12/2021     07/12/2021    MPV 10.1 07/12/2021     Lab Results   Component Value Date     07/13/2021    K 3.9 07/13/2021     07/13/2021    CO2 24 07/13/2021    BUN 28 07/13/2021    CREATININE 6.8 07/13/2021    CALCIUM 8.8 07/13/2021    PROT 6.1 07/09/2021    LABALBU 3.4 07/09/2021    LABALBU 4.1 05/18/2012    BILITOT 0.2 07/09/2021    ALKPHOS 190 07/09/2021    AST 30 07/09/2021    ALT 22 07/09/2021     Lab Results   Component Value Date    LIPASE 11 07/09/2021     Lab Results   Component Value Date    AMYLASE 40 12/14/2018         ASSESSMENT/PLAN:    1.  Nausea/vomiting/epigastric pain  -GERD versus PUD versus gastroparesis or combination thereof  -Possible contribution from constipation  -Change PPI to twice a day dosing IV  -Carafate  -Gastric emptying study appears abnormal -await official report  -EGD tomorrow    2.   Anemia  -H&H at baseline with decreased since admission likely related to hemodilution  -Normocytic  -No significant iron deficiency  -Monitor H&H  -EGD as above  -Outpatient colonoscopy    Marlon Feliciano MD  7/13/2021  12:31 PM    NOTE:  This report was transcribed using voice recognition software. Every effort was made to ensure accuracy; however, inadvertent computerized transcription errors may be present.

## 2021-07-13 NOTE — FLOWSHEET NOTE
07/13/21 0740   Vitals   BP (!) 150/76   Temp 98.2 °F (36.8 °C)   Temp Source Oral   Pulse 108   Resp 16   Weight 151 lb 0.2 oz (68.5 kg)   Cycler   Last Fill Volume 2000 mL   Ultrafiltration (UF) (mL) 965 mL   CCPD completed, catheter de-accessed, clamps closed, pin intact stay safe cap applied all while maintaining aseptic technique.  Clear yellow effluent, tolerated well, report to Lisa Corrales RN

## 2021-07-14 ENCOUNTER — ANESTHESIA (OUTPATIENT)
Dept: ENDOSCOPY | Age: 31
DRG: 420 | End: 2021-07-14
Payer: COMMERCIAL

## 2021-07-14 ENCOUNTER — ANESTHESIA EVENT (OUTPATIENT)
Dept: ENDOSCOPY | Age: 31
DRG: 420 | End: 2021-07-14
Payer: COMMERCIAL

## 2021-07-14 VITALS — OXYGEN SATURATION: 99 % | DIASTOLIC BLOOD PRESSURE: 80 MMHG | SYSTOLIC BLOOD PRESSURE: 110 MMHG

## 2021-07-14 LAB
ANION GAP SERPL CALCULATED.3IONS-SCNC: 13 MMOL/L (ref 7–16)
BASOPHILS ABSOLUTE: 0.04 E9/L (ref 0–0.2)
BASOPHILS RELATIVE PERCENT: 0.7 % (ref 0–2)
BUN BLDV-MCNC: 33 MG/DL (ref 6–20)
CALCIUM SERPL-MCNC: 8.3 MG/DL (ref 8.6–10.2)
CHLORIDE BLD-SCNC: 98 MMOL/L (ref 98–107)
CO2: 24 MMOL/L (ref 22–29)
CREAT SERPL-MCNC: 7.2 MG/DL (ref 0.5–1)
EOSINOPHILS ABSOLUTE: 0.19 E9/L (ref 0.05–0.5)
EOSINOPHILS RELATIVE PERCENT: 3.3 % (ref 0–6)
GFR AFRICAN AMERICAN: 8
GFR NON-AFRICAN AMERICAN: 8 ML/MIN/1.73
GLUCOSE BLD-MCNC: 402 MG/DL (ref 74–99)
HCT VFR BLD CALC: 25.4 % (ref 34–48)
HEMOGLOBIN: 8 G/DL (ref 11.5–15.5)
IMMATURE GRANULOCYTES #: 0.02 E9/L
IMMATURE GRANULOCYTES %: 0.4 % (ref 0–5)
LYMPHOCYTES ABSOLUTE: 1.62 E9/L (ref 1.5–4)
LYMPHOCYTES RELATIVE PERCENT: 28.5 % (ref 20–42)
MCH RBC QN AUTO: 30.8 PG (ref 26–35)
MCHC RBC AUTO-ENTMCNC: 31.5 % (ref 32–34.5)
MCV RBC AUTO: 97.7 FL (ref 80–99.9)
METER GLUCOSE: 103 MG/DL (ref 74–99)
METER GLUCOSE: 103 MG/DL (ref 74–99)
METER GLUCOSE: 115 MG/DL (ref 74–99)
METER GLUCOSE: 144 MG/DL (ref 74–99)
METER GLUCOSE: 214 MG/DL (ref 74–99)
METER GLUCOSE: 345 MG/DL (ref 74–99)
METER GLUCOSE: 375 MG/DL (ref 74–99)
METER GLUCOSE: 41 MG/DL (ref 74–99)
METER GLUCOSE: 420 MG/DL (ref 74–99)
METER GLUCOSE: 88 MG/DL (ref 74–99)
METER GLUCOSE: <40 MG/DL (ref 74–99)
MONOCYTES ABSOLUTE: 0.29 E9/L (ref 0.1–0.95)
MONOCYTES RELATIVE PERCENT: 5.1 % (ref 2–12)
NEUTROPHILS ABSOLUTE: 3.53 E9/L (ref 1.8–7.3)
NEUTROPHILS RELATIVE PERCENT: 62 % (ref 43–80)
PDW BLD-RTO: 16.1 FL (ref 11.5–15)
PLATELET # BLD: 149 E9/L (ref 130–450)
PMV BLD AUTO: 10.2 FL (ref 7–12)
POTASSIUM REFLEX MAGNESIUM: 3.9 MMOL/L (ref 3.5–5)
RBC # BLD: 2.6 E12/L (ref 3.5–5.5)
SODIUM BLD-SCNC: 135 MMOL/L (ref 132–146)
WBC # BLD: 5.7 E9/L (ref 4.5–11.5)

## 2021-07-14 PROCEDURE — 88313 SPECIAL STAINS GROUP 2: CPT

## 2021-07-14 PROCEDURE — 6360000002 HC RX W HCPCS: Performed by: HOSPITALIST

## 2021-07-14 PROCEDURE — 36415 COLL VENOUS BLD VENIPUNCTURE: CPT

## 2021-07-14 PROCEDURE — 6370000000 HC RX 637 (ALT 250 FOR IP): Performed by: HOSPITALIST

## 2021-07-14 PROCEDURE — 3700000001 HC ADD 15 MINUTES (ANESTHESIA): Performed by: INTERNAL MEDICINE

## 2021-07-14 PROCEDURE — 2500000003 HC RX 250 WO HCPCS: Performed by: INTERNAL MEDICINE

## 2021-07-14 PROCEDURE — 6370000000 HC RX 637 (ALT 250 FOR IP): Performed by: NURSE PRACTITIONER

## 2021-07-14 PROCEDURE — 6370000000 HC RX 637 (ALT 250 FOR IP): Performed by: INTERNAL MEDICINE

## 2021-07-14 PROCEDURE — 90945 DIALYSIS ONE EVALUATION: CPT

## 2021-07-14 PROCEDURE — 88342 IMHCHEM/IMCYTCHM 1ST ANTB: CPT

## 2021-07-14 PROCEDURE — 6360000002 HC RX W HCPCS: Performed by: INTERNAL MEDICINE

## 2021-07-14 PROCEDURE — 7100000010 HC PHASE II RECOVERY - FIRST 15 MIN: Performed by: INTERNAL MEDICINE

## 2021-07-14 PROCEDURE — 7100000011 HC PHASE II RECOVERY - ADDTL 15 MIN: Performed by: INTERNAL MEDICINE

## 2021-07-14 PROCEDURE — 88305 TISSUE EXAM BY PATHOLOGIST: CPT

## 2021-07-14 PROCEDURE — 2580000003 HC RX 258: Performed by: HOSPITALIST

## 2021-07-14 PROCEDURE — 82962 GLUCOSE BLOOD TEST: CPT

## 2021-07-14 PROCEDURE — 85025 COMPLETE CBC W/AUTO DIFF WBC: CPT

## 2021-07-14 PROCEDURE — 99254 IP/OBS CNSLTJ NEW/EST MOD 60: CPT | Performed by: PHYSICIAN ASSISTANT

## 2021-07-14 PROCEDURE — 99232 SBSQ HOSP IP/OBS MODERATE 35: CPT | Performed by: INTERNAL MEDICINE

## 2021-07-14 PROCEDURE — 2580000003 HC RX 258: Performed by: INTERNAL MEDICINE

## 2021-07-14 PROCEDURE — 3700000000 HC ANESTHESIA ATTENDED CARE: Performed by: INTERNAL MEDICINE

## 2021-07-14 PROCEDURE — 0DB98ZX EXCISION OF DUODENUM, VIA NATURAL OR ARTIFICIAL OPENING ENDOSCOPIC, DIAGNOSTIC: ICD-10-PCS | Performed by: INTERNAL MEDICINE

## 2021-07-14 PROCEDURE — 6360000002 HC RX W HCPCS: Performed by: NURSE ANESTHETIST, CERTIFIED REGISTERED

## 2021-07-14 PROCEDURE — 2709999900 HC NON-CHARGEABLE SUPPLY: Performed by: INTERNAL MEDICINE

## 2021-07-14 PROCEDURE — C9113 INJ PANTOPRAZOLE SODIUM, VIA: HCPCS | Performed by: HOSPITALIST

## 2021-07-14 PROCEDURE — 1200000000 HC SEMI PRIVATE

## 2021-07-14 PROCEDURE — 6360000002 HC RX W HCPCS

## 2021-07-14 PROCEDURE — 80048 BASIC METABOLIC PNL TOTAL CA: CPT

## 2021-07-14 PROCEDURE — 3609012400 HC EGD TRANSORAL BIOPSY SINGLE/MULTIPLE: Performed by: INTERNAL MEDICINE

## 2021-07-14 RX ORDER — FENTANYL CITRATE 50 UG/ML
INJECTION, SOLUTION INTRAMUSCULAR; INTRAVENOUS
Status: COMPLETED
Start: 2021-07-14 | End: 2021-07-14

## 2021-07-14 RX ORDER — MECOBALAMIN 5000 MCG
10 TABLET,DISINTEGRATING ORAL ONCE
Status: COMPLETED | OUTPATIENT
Start: 2021-07-14 | End: 2021-07-14

## 2021-07-14 RX ORDER — FENTANYL CITRATE 50 UG/ML
25 INJECTION, SOLUTION INTRAMUSCULAR; INTRAVENOUS ONCE
Status: COMPLETED | OUTPATIENT
Start: 2021-07-14 | End: 2021-07-14

## 2021-07-14 RX ORDER — PREGABALIN 25 MG/1
25 CAPSULE ORAL 3 TIMES DAILY
Status: DISCONTINUED | OUTPATIENT
Start: 2021-07-14 | End: 2021-07-14

## 2021-07-14 RX ORDER — DEXTROSE AND SODIUM CHLORIDE 5; .45 G/100ML; G/100ML
INJECTION, SOLUTION INTRAVENOUS CONTINUOUS
Status: DISCONTINUED | OUTPATIENT
Start: 2021-07-14 | End: 2021-07-15

## 2021-07-14 RX ORDER — PREGABALIN 25 MG/1
25 CAPSULE ORAL DAILY
Status: DISCONTINUED | OUTPATIENT
Start: 2021-07-15 | End: 2021-07-15 | Stop reason: HOSPADM

## 2021-07-14 RX ORDER — INSULIN GLARGINE 100 [IU]/ML
7 INJECTION, SOLUTION SUBCUTANEOUS ONCE
Status: COMPLETED | OUTPATIENT
Start: 2021-07-14 | End: 2021-07-14

## 2021-07-14 RX ORDER — PROPOFOL 10 MG/ML
INJECTION, EMULSION INTRAVENOUS CONTINUOUS PRN
Status: DISCONTINUED | OUTPATIENT
Start: 2021-07-14 | End: 2021-07-14 | Stop reason: SDUPTHER

## 2021-07-14 RX ADMIN — Medication 10 MG: at 01:15

## 2021-07-14 RX ADMIN — FENTANYL CITRATE 12.5 MCG: 50 INJECTION INTRAMUSCULAR; INTRAVENOUS at 09:35

## 2021-07-14 RX ADMIN — SEVELAMER CARBONATE 800 MG: 800 TABLET, FILM COATED ORAL at 18:26

## 2021-07-14 RX ADMIN — PANTOPRAZOLE SODIUM 40 MG: 40 INJECTION, POWDER, FOR SOLUTION INTRAVENOUS at 22:06

## 2021-07-14 RX ADMIN — PROPOFOL 140 MCG/KG/MIN: 10 INJECTION, EMULSION INTRAVENOUS at 16:08

## 2021-07-14 RX ADMIN — ROPINIROLE HYDROCHLORIDE 0.25 MG: 0.25 TABLET, FILM COATED ORAL at 22:05

## 2021-07-14 RX ADMIN — FENTANYL CITRATE 12.5 MCG: 50 INJECTION INTRAMUSCULAR; INTRAVENOUS at 12:32

## 2021-07-14 RX ADMIN — FENTANYL CITRATE 12.5 MCG: 50 INJECTION INTRAMUSCULAR; INTRAVENOUS at 00:50

## 2021-07-14 RX ADMIN — LINEZOLID 600 MG: 600 TABLET, FILM COATED ORAL at 22:05

## 2021-07-14 RX ADMIN — LINEZOLID 600 MG: 600 TABLET, FILM COATED ORAL at 18:24

## 2021-07-14 RX ADMIN — FENTANYL CITRATE 12.5 MCG: 50 INJECTION INTRAMUSCULAR; INTRAVENOUS at 04:15

## 2021-07-14 RX ADMIN — FENTANYL CITRATE 12.5 MCG: 50 INJECTION INTRAMUSCULAR; INTRAVENOUS at 21:09

## 2021-07-14 RX ADMIN — ARIPIPRAZOLE 5 MG: 5 TABLET ORAL at 22:06

## 2021-07-14 RX ADMIN — EPOETIN ALFA-EPBX 3000 UNITS: 3000 INJECTION, SOLUTION INTRAVENOUS; SUBCUTANEOUS at 18:27

## 2021-07-14 RX ADMIN — SODIUM CHLORIDE, PRESERVATIVE FREE 10 ML: 5 INJECTION INTRAVENOUS at 22:06

## 2021-07-14 RX ADMIN — INSULIN LISPRO 1 UNITS: 100 INJECTION, SOLUTION INTRAVENOUS; SUBCUTANEOUS at 19:25

## 2021-07-14 RX ADMIN — INSULIN GLARGINE 7 UNITS: 100 INJECTION, SOLUTION SUBCUTANEOUS at 09:34

## 2021-07-14 RX ADMIN — CEFDINIR 300 MG: 300 CAPSULE ORAL at 18:27

## 2021-07-14 RX ADMIN — FENTANYL CITRATE 12.5 MCG: 50 INJECTION INTRAMUSCULAR; INTRAVENOUS at 18:23

## 2021-07-14 RX ADMIN — CARVEDILOL 6.25 MG: 6.25 TABLET, FILM COATED ORAL at 08:25

## 2021-07-14 RX ADMIN — HYDRALAZINE HYDROCHLORIDE 50 MG: 50 TABLET ORAL at 22:05

## 2021-07-14 RX ADMIN — DEXTROSE MONOHYDRATE 12.5 G: 25 INJECTION, SOLUTION INTRAVENOUS at 13:25

## 2021-07-14 RX ADMIN — SODIUM CHLORIDE, PRESERVATIVE FREE 10 ML: 5 INJECTION INTRAVENOUS at 22:24

## 2021-07-14 RX ADMIN — DEXTROSE AND SODIUM CHLORIDE: 5; 450 INJECTION, SOLUTION INTRAVENOUS at 14:09

## 2021-07-14 RX ADMIN — FENTANYL CITRATE 25 MCG: 50 INJECTION, SOLUTION INTRAMUSCULAR; INTRAVENOUS at 16:42

## 2021-07-14 RX ADMIN — MIRTAZAPINE 15 MG: 15 TABLET, FILM COATED ORAL at 22:05

## 2021-07-14 RX ADMIN — INSULIN LISPRO 3 UNITS: 100 INJECTION, SOLUTION INTRAVENOUS; SUBCUTANEOUS at 09:31

## 2021-07-14 RX ADMIN — CARVEDILOL 6.25 MG: 6.25 TABLET, FILM COATED ORAL at 18:34

## 2021-07-14 RX ADMIN — INSULIN LISPRO 3 UNITS: 100 INJECTION, SOLUTION INTRAVENOUS; SUBCUTANEOUS at 06:48

## 2021-07-14 RX ADMIN — SUCRALFATE 1 G: 1 TABLET ORAL at 00:16

## 2021-07-14 RX ADMIN — SUCRALFATE 1 G: 1 TABLET ORAL at 18:24

## 2021-07-14 RX ADMIN — INSULIN LISPRO 1 UNITS: 100 INJECTION, SOLUTION INTRAVENOUS; SUBCUTANEOUS at 22:10

## 2021-07-14 RX ADMIN — PANTOPRAZOLE SODIUM 40 MG: 40 INJECTION, POWDER, FOR SOLUTION INTRAVENOUS at 08:26

## 2021-07-14 ASSESSMENT — PAIN SCALES - GENERAL
PAINLEVEL_OUTOF10: 9
PAINLEVEL_OUTOF10: 8
PAINLEVEL_OUTOF10: 7
PAINLEVEL_OUTOF10: 0
PAINLEVEL_OUTOF10: 0
PAINLEVEL_OUTOF10: 8
PAINLEVEL_OUTOF10: 9
PAINLEVEL_OUTOF10: 6
PAINLEVEL_OUTOF10: 0
PAINLEVEL_OUTOF10: 9
PAINLEVEL_OUTOF10: 8

## 2021-07-14 ASSESSMENT — PAIN DESCRIPTION - LOCATION
LOCATION: ABDOMEN
LOCATION: GENERALIZED
LOCATION: ABDOMEN

## 2021-07-14 ASSESSMENT — PAIN DESCRIPTION - ONSET
ONSET: GRADUAL
ONSET: GRADUAL

## 2021-07-14 ASSESSMENT — PAIN DESCRIPTION - DESCRIPTORS
DESCRIPTORS: ACHING
DESCRIPTORS: ACHING
DESCRIPTORS: CRAMPING

## 2021-07-14 ASSESSMENT — LIFESTYLE VARIABLES: SMOKING_STATUS: 0

## 2021-07-14 ASSESSMENT — PAIN DESCRIPTION - PAIN TYPE
TYPE: CHRONIC PAIN

## 2021-07-14 ASSESSMENT — PAIN DESCRIPTION - PROGRESSION
CLINICAL_PROGRESSION: GRADUALLY WORSENING
CLINICAL_PROGRESSION: GRADUALLY IMPROVING
CLINICAL_PROGRESSION: GRADUALLY IMPROVING

## 2021-07-14 ASSESSMENT — PAIN DESCRIPTION - FREQUENCY
FREQUENCY: CONTINUOUS
FREQUENCY: CONTINUOUS

## 2021-07-14 ASSESSMENT — PAIN DESCRIPTION - ORIENTATION: ORIENTATION: OTHER (COMMENT)

## 2021-07-14 NOTE — ANESTHESIA PRE PROCEDURE
Department of Anesthesiology  Preprocedure Note       Name:  Heri Taveras   Age:  36 y.o.  :  1990                                          MRN:  [id number]         Date:  2021      Surgeon: Flaco Dennis):  Hannah Hawk MD    Procedure: Procedure(s):  EGD ESOPHAGOGASTRODUODENOSCOPY WITH BIOPSY, BLEED CONTROL, POSSIBLE DILATION    Medications prior to admission:   Prior to Admission medications    Medication Sig Start Date End Date Taking? Authorizing Provider   insulin glargine (LANTUS) 100 UNIT/ML injection vial Inject 7 Units into the skin daily New change in dose and frequency 7/3/21  Yes Tabitha Penaloza MD   insulin lispro, 1 Unit Dial, (HUMALOG KWIKPEN) 100 UNIT/ML SOPN Inject 3 units with meals + sliding scale. MAX 30U/day 21  Yes Willie Ortiz MD   mirtazapine (REMERON) 15 MG tablet Take 15 mg by mouth nightly   Yes Historical Provider, MD   hydrOXYzine (ATARAX) 25 MG tablet Take 25 mg by mouth daily   Yes Historical Provider, MD   ARIPiprazole (ABILIFY) 5 MG tablet Take 5 mg by mouth nightly 21  Yes Historical Provider, MD   hydrALAZINE (APRESOLINE) 10 MG tablet Take 1 tablet by mouth 2 times daily 21  Yes Jacinda Littlejohn, DO   metoprolol tartrate (LOPRESSOR) 25 MG tablet Take 1 tablet by mouth daily 21  Yes Jacinda Littlejohn, DO   rOPINIRole (REQUIP) 0.25 MG tablet Take 0.25 mg by mouth nightly   Yes Historical Provider, MD   sevelamer (RENVELA) 800 MG tablet Take 1 tablet by mouth 3 times daily (with meals) New lower dose 21   Tabitha Penaloza MD   linezolid (ZYVOX) 600 MG tablet Take 1 tablet by mouth every 12 hours for 14 days 7/1/21 7/15/21  Padmaja Mendez MD   Insulin Pen Needle (BD PEN NEEDLE NAV U/F) 32G X 4 MM MISC Uses with insulin 4 times a day 21   Willie Ortiz MD   blood glucose monitor strips Freestyle Lite Strips. Checks 4 times/day before meals and at bedtime and as needed for symptoms of irregular blood glucose.  21   Zeke (OMNICEF) capsule 300 mg  300 mg Oral Daily Tara Rush MD   300 mg at 07/13/21 1400    pantoprazole (PROTONIX) injection 40 mg  40 mg Intravenous BID Donovan Wray MD   40 mg at 07/14/21 2847    And    sodium chloride (PF) 0.9 % injection 10 mL  10 mL Intravenous BID Donovan Wray MD   10 mL at 07/13/21 2124    sucralfate (CARAFATE) tablet 1 g  1 g Oral 4 times per day Donovan Wray MD   1 g at 07/14/21 0016    polyethylene glycol (GLYCOLAX) packet 17 g  17 g Oral BID Donovan Wray MD   17 g at 07/12/21 1550    insulin lispro (HUMALOG) injection vial 0-3 Units  0-3 Units Subcutaneous Nightly HEBER Overton - CNP   1 Units at 07/13/21 2125    calcium carbonate (TUMS) chewable tablet 500 mg  500 mg Oral TID PRN Imelda Cortez DO   500 mg at 07/12/21 0909    hydrALAZINE (APRESOLINE) tablet 50 mg  50 mg Oral 3 times per day Evin Lopez DO   50 mg at 07/13/21 2124    carvedilol (COREG) tablet 6.25 mg  6.25 mg Oral BID WC Evin Lopez DO   6.25 mg at 07/14/21 0825    ARIPiprazole (ABILIFY) tablet 5 mg  5 mg Oral Nightly Jl Gonzalez MD   5 mg at 07/13/21 2124    insulin glargine (LANTUS) injection vial 7 Units  7 Units Subcutaneous Daily Mandi Thompson MD   7 Units at 07/12/21 0901    levothyroxine (SYNTHROID) tablet 75 mcg  75 mcg Oral Daily Jl Gonzalez MD   75 mcg at 07/13/21 1336    linezolid (ZYVOX) tablet 600 mg  600 mg Oral 2 times per day Mandi Thompson MD   600 mg at 07/13/21 2124    mirtazapine (REMERON) tablet 15 mg  15 mg Oral Nightly Jl Gonzalez MD   15 mg at 07/13/21 2125    rOPINIRole (REQUIP) tablet 0.25 mg  0.25 mg Oral Nightly Jl Gonzalez MD   0.25 mg at 07/13/21 2124    sevelamer (RENVELA) tablet 800 mg  800 mg Oral TID WC Jl Gonzalez MD   800 mg at 07/13/21 1758    glucose (GLUTOSE) 40 % oral gel 15 g  15 g Oral PRN Jl Gonzalez MD        glucagon (rDNA) injection 1 mg  1 mg Intramuscular PRN Pablo Almazan MD        dextrose 5 % solution  100 mL/hr Intravenous PRN Jl Gonzalez MD        sodium chloride flush 0.9 % injection 10 mL  10 mL Intravenous 2 times per day Pablo Almazan MD   10 mL at 07/13/21 2124    sodium chloride flush 0.9 % injection 10 mL  10 mL Intravenous PRN Jl Gonzalez MD        0.9 % sodium chloride infusion  25 mL Intravenous PRN Jl Gonzalez MD        polyethylene glycol (GLYCOLAX) packet 17 g  17 g Oral Daily PRN Jl Gonzalez MD        acetaminophen (TYLENOL) tablet 650 mg  650 mg Oral Q6H PRN Jl Gonzalez MD   650 mg at 07/12/21 1740    Or    acetaminophen (TYLENOL) suppository 650 mg  650 mg Rectal Q6H PRN Jl Gonzalez MD        prochlorperazine (COMPAZINE) injection 10 mg  10 mg Intravenous Q6H PRN Jl Gonzalez MD   10 mg at 07/11/21 1037    heparin (porcine) injection 5,000 Units  5,000 Units Subcutaneous 3 times per day Pablo Almazan MD   5,000 Units at 07/12/21 1552    insulin lispro (HUMALOG) injection vial 0-6 Units  0-6 Units Subcutaneous TID  Jl Gonzalez MD   1 Units at 07/13/21 1802    hydrALAZINE (APRESOLINE) injection 20 mg  20 mg Intravenous Q6H PRN Evin Lopez, DO   20 mg at 07/12/21 2015    fentaNYL (SUBLIMAZE) injection 12.5 mcg  12.5 mcg Intravenous Q2H PRN Evin Lopez, DO   12.5 mcg at 07/14/21 1232    dextrose 50 % IV solution  12.5 g Intravenous PRN Evin Lopez, DO   12.5 g at 07/14/21 1325    dextrose 5 % and 0.45 % sodium chloride infusion   Intravenous Continuous PRN Evin Lopez  mL/hr at 07/10/21 1441 New Bag at 07/10/21 1441    [Held by provider] insulin regular (HUMULIN R;NOVOLIN R) 100 Units in sodium chloride 0.9 % 100 mL infusion  0.1 Units/kg/hr Intravenous Continuous Evin Lopez, DO 1.1 mL/hr at 07/10/21 1553 1.08 Units/hr at 07/10/21 1553    potassium chloride 10 mEq/100 mL IVPB (Peripheral Line)  10 mEq Intravenous PRN Sidney Charles MD        magnesium sulfate 2000 Past Medical History:        Diagnosis Date    Acute congestive heart failure ([address]     BC (acute kidney injury) ([address] 10/1/2019    Cardiac arrest ([address] 2/15/2021    Cephalgia 10/9/2019    Chronic kidney disease     Depression     Diabetes mellitus ([address]     Diabetic gastroparesis associated with type 1 diabetes mellitus ([address] 2018    Diabetic ketoacidosis ([address] 2011    Diabetic ketoacidosis with coma associated with type 1 diabetes mellitus ([address] 2013    Diabetic polyneuropathy associated with type 1 diabetes mellitus ([address] 2020    Drug use complicating pregnancy in third trimester     Endocarditis 10/31/2020    ESRD (end stage renal disease) ([address] 2020    Hemodialysis patient ([address]     History of blood transfusion 2019    Hyperlipidemia 10/8/2020    Hyperosmolar hyperglycemic state (HHS) ([address] 2020    Hypothyroidism 10/8/2020    Iron deficiency anemia 10/1/2019    MDRO (multiple drug resistant organisms) resistance     MRSA (methicillin resistant Staphylococcus aureus)     back wound abcess    Non compliance w medication regimen 3/30/2016    Other disorders of kidney and ureter     Pregnancy 3/30/2016    16 weeks    Previous  delivery affecting pregnancy, antepartum 3/2/2017    Previous stillbirth or demise, antepartum 2016    Seizure ([address] 2020    Severe pre-eclampsia in third trimester 2016    Shock liver 2/15/2021    Valvular endocarditis 11/10/2020    This Diagnosis was added to the Problem List based on transcribed orders from Dr. Erma Angelo          Past Surgical History:        Procedure Laterality Date    BACK SURGERY      abscess    [address]      x2    CHOLECYSTECTOMY, LAPAROSCOPIC N/A 2019    CHOLECYSTECTOMY LAPAROSCOPIC performed by Anish Montesinos MD at [address] [facility] N/A 2018    COLONOSCOPY WITH BIOPSY performed by Ema Olmstead MD at [facility] 2018    COLONOSCOPY WITH BIOPSY performed by Zaira Victoria MD at [address]  2012    EF 57%    ECHO COMPL W DOP COLOR FLOW  6/10/2013         EMBOLECTOMY N/A 2020    [address] [place] [address], [id number] [place] [address], YULISSA -- REQS ROOM 3 performed by Hamzah Pena MD at [address] [place] [address] Left 2021    LEFT LEG INCISION AND DRAINAGE, DEBRIDEMENT, WOUND VAC APPLICATION performed by Clemencia Mcqueen DPM at [address] [place] [address] Left 2021    LEFT LEG DEBRIDEMENT BIOPSY POSS APPLICATION WOUND VAC performed by Clemencia Mcqueen DPM at [place] [address] N/A 2020    LAPAROSCOPIC INSERTION PERITONEAL DIALYSIS CATHETER performed by Mya Tidwell MD at [facility] [address] ESOPHAGOGASTRODUODENOSCOPY TRANSORAL DIAGNOSTIC N/A 2018    EGD ESOPHAGOGASTRODUODENOSCOPY performed by Rosemary Bacon MD at [id number] [place] [address] TRANSESOPHAGEAL ECHOCARDIOGRAM N/A 10/19/2020    TRANSESOPHAGEAL ECHOCARDIOGRAM WITH BUBBLE STUDY performed by Jose Alfredo Deshpande MD at [address] [facility]  2018    UPPER GASTROINTESTINAL ENDOSCOPY  2018    EGD BIOPSY performed by Zaira Victoria MD at [address] [facility] N/A 10/11/2019    EGD ESOPHAGOGASTRODUODENOSCOPY performed by Ernst Anderson DO at [address] [facility] History:    Social History     Tobacco Use    Smoking status: Former Smoker     Packs/day: 0.50     Years: 6.00     Pack years: 3.00     Types: Cigarettes     Quit date: 2021     Years since quittin.4    Smokeless tobacco: Never Used    Tobacco comment: vaper cig   Substance Use Topics    Alcohol use:  No                                Counseling given: Not Answered  Comment: vaper cig      Vital Signs (Current):   Vitals:    21 0046 21 0615 21 0804 21 1217   BP:  139/78 129/83 105/69   Pulse:  108 109 99   Resp:  14 16    Temp:  98.8 °F (37.1 °C) 98 °F (36.7 °C)    TempSrc:  Oral Oral    SpO2:  100% 99%    Weight: 156 lb (70.8 kg)      Height:                                                  BP Readings from Last 3 Encounters:   07/14/21 105/69   07/02/21 136/82   06/01/21 (!) 89/52       NPO Status:                                                                                 BMI:   Wt Readings from Last 3 Encounters:   07/14/21 156 lb (70.8 kg)   06/30/21 138 lb 0.1 oz (62.6 kg)   05/20/21 140 lb 14.4 oz (63.9 kg)     Body mass index is 26.78 kg/m². CBC:   Lab Results   Component Value Date    WBC 5.7 07/14/2021    RBC 2.60 07/14/2021    HGB 8.0 07/14/2021    HCT 25.4 07/14/2021    MCV 97.7 07/14/2021    RDW 16.1 07/14/2021     07/14/2021       CMP:   Lab Results   Component Value Date     07/14/2021    K 3.9 07/14/2021    CL 98 07/14/2021    CO2 24 07/14/2021    BUN 33 07/14/2021    CREATININE 7.2 07/14/2021    GFRAA 8 07/14/2021    LABGLOM 8 07/14/2021    GLUCOSE 402 07/14/2021    GLUCOSE 130 05/18/2012    PROT 6.1 07/09/2021    CALCIUM 8.3 07/14/2021    BILITOT 0.2 07/09/2021    ALKPHOS 190 07/09/2021    AST 30 07/09/2021    ALT 22 07/09/2021       POC Tests: No results for input(s): POCGLU, POCNA, POCK, POCCL, POCBUN, POCHEMO, POCHCT in the last 72 hours.     Coags:   Lab Results   Component Value Date    PROTIME 15.8 02/16/2021    PROTIME 13.6 08/14/2011    INR 1.3 02/16/2021    APTT 30.0 10/31/2020       HCG (If Applicable):   Lab Results   Component Value Date    PREGTESTUR NEGATIVE 11/03/2020    HCG 30285.4 (H) 06/26/2013        ABGs:   Lab Results   Component Value Date    PHART 7.345 07/20/2012    PO2ART 91.5 10/29/2019    JUM3SKF 35.0 10/29/2019    RLS5FNU 14.0 02/15/2021    I9ZKHZQE 97.7 09/08/2012        Type & Screen (If Applicable):  Lab Results   Component Value Date    LABABO O 12/29/2011    [address] POSITIVE 12/29/2011       Drug/Infectious Status (If Applicable):  No results found for: HIV, HEPCAB    COVID-19 Screening (If Applicable):   Lab Results   Component Value Date    COVID19 Not Detected 07/10/2021         Anesthesia Evaluation  Patient summary reviewed no history of anesthetic complications:   Airway: Mallampati: II  TM distance: >3 FB   Neck ROM: full  Mouth opening: > = 3 FB Dental: normal exam         Pulmonary: breath sounds clear to auscultation      (-) not a current smoker                          ROS comment: Recently intubated after cardiac arrest at home   Cardiovascular:    (+) hypertension:, valvular problems/murmurs:, CHF:,         Rhythm: regular  Rate: abnormal                    Neuro/Psych:   (+) seizures:, neuromuscular disease (diabetic neuropathy):, headaches:, psychiatric history: stable with treatmentdepression/anxiety             GI/Hepatic/Renal:   (+) liver disease:, renal disease: dialysis and ESRD,      (-) no morbid obesity       Endo/Other:    (+) Diabetes (Diabetic ketoacidosis with coma )Type I DM, poorly controlled, using insulin, hypothyroidism, blood dyscrasia: anemia:., .                 Abdominal:         (-) obese       Vascular: Other Findings:               Anesthesia Plan      MAC     ASA 4       Induction: intravenous. Anesthetic plan and risks discussed with patient. Plan discussed with CRNA. DOS STAFF ADDENDUM:    Pt seen and examined, chart reviewed (including anesthesia, drug and allergy history). Anesthetic plan, risks, benefits, alternatives, and personnel involved discussed with patient. Patient verbalized an understanding and agrees to proceed. Plan discussed with care team members and agreed upon.     Nat Schwab MD  Staff Anesthesiologist  3:51 PM    Nat Schwab MD   7/14/2021

## 2021-07-14 NOTE — H&P
Pertinent notes/labs reviewed.   H&P reviewed -no new changes except as above  Vitals:    07/14/21 0804   BP: 129/83   Pulse: 109   Resp: 16   Temp: 98 °F (36.7 °C)   SpO2: 99%     Plan to proceed with upper endoscopy later today    Larry Aguillon MD

## 2021-07-14 NOTE — PROGRESS NOTES
CCPD completed. Pt disconnected and staysafe cap applied using aseptic technique. 900 ml pale yellow effluent noted. No fibrin. VS WNL. Pt resting comfortably.

## 2021-07-14 NOTE — ANESTHESIA POSTPROCEDURE EVALUATION
Department of Anesthesiology  Postprocedure Note    Patient: Mirna Le  MRN: [id number]  YOB: 1990  Date of evaluation: 7/14/2021  Time:  6:50 PM     Procedure Summary     Date: 07/14/21 Room / Location: [address] [place] [address] 01 / [address] [place] [address]    Anesthesia Start: 5538 Anesthesia Stop: 6439    Procedure: EGD BIOPSY (N/A ) Diagnosis: (ANEMIA)    Surgeons: Dipika Shahid MD Responsible Provider: Colette Tompkins MD    Anesthesia Type: MAC ASA Status: 4          Anesthesia Type: MAC    Shilpa Phase I:      Shilpa Phase II: Shilpa Score: 10    Last vitals: Reviewed and per EMR flowsheets.        Anesthesia Post Evaluation    Patient location during evaluation: PACU  Patient participation: complete - patient participated  Level of consciousness: awake and alert  Airway patency: patent  Nausea & Vomiting: no nausea and no vomiting  Complications: no  Cardiovascular status: hemodynamically stable  Respiratory status: acceptable  Hydration status: euvolemic

## 2021-07-14 NOTE — CARE COORDINATION
7-14-Cm note: ( covid neg 7-10) pt has no change in discharge plans, she will return home with her mother and children, pt does PD at home, her supplies are delivered to her so she has no issues with that, her blood sugars are still labile at times. Pt denies any needs at dc , mom is a nurse and assists her with needs at home.  Electronically signed by Junior Arguello RN on 7/14/2021 at 3:03 PM

## 2021-07-14 NOTE — PROGRESS NOTES
Physician Progress Note      Kelsea Andrews  [facility] #:                  [id number]  :                       1990  ADMIT DATE:       2021 7:55 PM  [address] [place] DATE:  RESPONDING  PROVIDER #:        Nikia Morse MD          QUERY TEXT:    Patient admitted with DKA and UTI. Noted documentation of Sepsis in critical   care progress note . Please document in progress notes and discharge   summary if you are evaluating and/or treating the following: The medical record reflects the following:  Risk Factors: per documentation diagnosis of UTI, history of endocarditis  Clinical Indicators: admission lactic acid 4.0, repeat 2.3, wbc 7.0, 5.5,    temp max 98.5, heart rate 9-110, per medicine progress notes DKA, type I DM,   uncontrolled - was transferred to ICU for uncontrolled BG >500  and elevated   lactic acid, VRE UTI form last admission now with urine Cx growing Serratia   marcens,  per Critical Care note Sepsis with lactic acidosis with hx of   endocarditis under treatment  Treatment:  IVF, lab work monitoring    Thank you  Brandi Hill RN CCDS  [phone]  Options provided:  -- Sepsis was ruled out after study  -- Sepsis present on admission  -- Sepsis not present on admission  -- Other - I will add my own diagnosis  -- Disagree - Not applicable / Not valid  -- Disagree - Clinically unable to determine / Unknown  -- Refer to Clinical Documentation Reviewer    PROVIDER RESPONSE TEXT:    This patient has Sepsis that was not present on admission.     Query created by: Shahnaz Whitfield on 2021 1:56 PM      Electronically signed by:  Nikia Morse MD 2021 9:22 AM

## 2021-07-14 NOTE — FLOWSHEET NOTE
07/13/21 1940   Vitals   BP (!) 157/89   Temp 98.7 °F (37.1 °C)   Temp Source Oral   Pulse 117   Resp 16   Weight 154 lb 12.2 oz (70.2 kg)   Peritoneal Dialysis Catheter Left lower abdomen   Placement Date/Time: 10/31/20 0704   Catheter Location: Left lower abdomen   Status Accessed   Site Condition No Complications   Dressing Status Clean;Dry; Intact; Changed   Dressing Gauze   Cycler   Verification of Prescription CCPD   Total Volume Programmed 55822 mL   Therapy Time (Hours:Minutes) 9   Fill Volume 2000 mL   Last Fill Volume 2000 mL   Number of Cycles 5   Bag Volume 5000 mL   Number of Bags Used 2   CCPD initiated, catheter accessed, drsg changed, using aseptic technique, clear yellow effluent, draining/filling without difficulty.  Report to Garett Koenig RN

## 2021-07-14 NOTE — PROCEDURES
Procedure:  Esophagogastroduodenoscopy with Biopsy    Indication: Nausea and vomiting, delayed gastric emptying, diabetes with end-stage manifestations    Sedation  MAC    Endoscope was advanced easily through mouth to second portion of duodenum      Oropharynx views are limited but grossly normal.    Esophagus:   LA-B erosive esophagitis. GEJ at 35 cm. Stomach:   Antrum is normal    Gastric body is normal.    Retroflexed views show normal fundus and cardia. Large amount of retained food in the stomach                          Antral biopsies for amyloidosis taken    Duodenum: Bulb is normal.    Second portion of duodenum is normal.                          Biopsies for celiac taken    EBL: Less than 1 cc    IMPRESSION AND PLAN:     1. LA-B erosive esophagitis with significant amount of retained food in the stomach. Await biopsy results. Given chronic illness will rule out secondary amyloidosis. Prokinetic therapy. Bowel regimen. Follow up as outpatient in office, call [phone] to schedule for appointment.       Gage Stern MD  7/14/2021  4:14 PM

## 2021-07-14 NOTE — H&P
Immediately prior to the procedure the patient's History and Physical was reviewed- there are no changes with the current vitals. Blood pressure 105/69, pulse 99, temperature 98 °F (36.7 °C), temperature source Oral, resp. rate 16, height 5' 4\" (1.626 m), weight 156 lb (70.8 kg), SpO2 99 %, not currently breastfeeding.

## 2021-07-14 NOTE — PROGRESS NOTES
Blood sugar at 1322 resulted RRLo. Checked a second time and returned at 39. Patient unresponsive. Pushed 12.5g D50 as per protocol. IV fluids started D5 .Isak@DoublePositive. Blood Sugar 115 @ 1340 and patient A+Ox4.

## 2021-07-14 NOTE — CONSULTS
Diabetic ketoacidosis without coma associated with type 1 diabetes mellitus ([facility] [address] 03/18/2018    MTHFR mutation 07/19/2016    Tobacco smoking complicating pregnancy 08/08/0127    Non compliance w medication regimen 03/30/2016     Current Facility-Administered Medications   Medication Dose Route Frequency Provider Last Rate Last Admin    loperamide (IMODIUM) capsule 4 mg  4 mg Oral BID PRN Cyrus Bustamante MD   4 mg at 07/13/21 2125    cefdinir (OMNICEF) capsule 300 mg  300 mg Oral Daily Cyrus Bustamante MD   300 mg at 07/13/21 1400    pantoprazole (PROTONIX) injection 40 mg  40 mg Intravenous BID Jane Plata MD   40 mg at 07/14/21 3589    And    sodium chloride (PF) 0.9 % injection 10 mL  10 mL Intravenous BID Jane Plata MD   10 mL at 07/13/21 2124    sucralfate (CARAFATE) tablet 1 g  1 g Oral 4 times per day Jane Plata MD   1 g at 07/14/21 0016    polyethylene glycol (GLYCOLAX) packet 17 g  17 g Oral BID Jane Plata MD   17 g at 07/12/21 1550    insulin lispro (HUMALOG) injection vial 0-3 Units  0-3 Units Subcutaneous Nightly HEBER Coreas - CNP   1 Units at 07/13/21 2125    calcium carbonate (TUMS) chewable tablet 500 mg  500 mg Oral TID PRN Luz Situ, DO   500 mg at 07/12/21 0909    hydrALAZINE (APRESOLINE) tablet 50 mg  50 mg Oral 3 times per day Evin Lopez DO   50 mg at 07/13/21 2124    carvedilol (COREG) tablet 6.25 mg  6.25 mg Oral BID  Evin Lopez DO   6.25 mg at 07/14/21 0825    ARIPiprazole (ABILIFY) tablet 5 mg  5 mg Oral Nightly Jl Gonzalez MD   5 mg at 07/13/21 2124    insulin glargine (LANTUS) injection vial 7 Units  7 Units Subcutaneous Daily Wm Yu MD   7 Units at 07/12/21 0901    levothyroxine (SYNTHROID) tablet 75 mcg  75 mcg Oral Daily Jl Gonzalez MD   75 mcg at 07/13/21 1336    linezolid (ZYVOX) tablet 600 mg  600 mg Oral 2 times per day Wm Yu MD   600 mg at 07/13/21 6760  mirtazapine (REMERON) tablet 15 mg  15 mg Oral Nightly Jl Gonzalez MD   15 mg at 07/13/21 2125    rOPINIRole (REQUIP) tablet 0.25 mg  0.25 mg Oral Nightly Jl Gonzalez MD   0.25 mg at 07/13/21 2124    sevelamer (RENVELA) tablet 800 mg  800 mg Oral TID  Jl Gonzalez MD   800 mg at 07/13/21 1758    glucose (GLUTOSE) 40 % oral gel 15 g  15 g Oral PRN Jl Gonzalez MD        glucagon (rDNA) injection 1 mg  1 mg Intramuscular PRN Jl Gonzalez MD        dextrose 5 % solution  100 mL/hr Intravenous PRN Jl Gonzalez MD        sodium chloride flush 0.9 % injection 10 mL  10 mL Intravenous 2 times per day Elias Ferrari MD   10 mL at 07/13/21 2124    sodium chloride flush 0.9 % injection 10 mL  10 mL Intravenous PRN Jl Gonzalez MD        0.9 % sodium chloride infusion  25 mL Intravenous PRN Jl Gonzalez MD        polyethylene glycol (GLYCOLAX) packet 17 g  17 g Oral Daily PRN Jl Gonzalez MD        acetaminophen (TYLENOL) tablet 650 mg  650 mg Oral Q6H PRN Jl Gonzalez MD   650 mg at 07/12/21 1740    Or    acetaminophen (TYLENOL) suppository 650 mg  650 mg Rectal Q6H PRN Jl Gonzalez MD        prochlorperazine (COMPAZINE) injection 10 mg  10 mg Intravenous Q6H PRN Jl Gonzalez MD   10 mg at 07/11/21 1037    heparin (porcine) injection 5,000 Units  5,000 Units Subcutaneous 3 times per day Elias Ferrari MD   5,000 Units at 07/12/21 1552    insulin lispro (HUMALOG) injection vial 0-6 Units  0-6 Units Subcutaneous TID  Jl Gonzalez MD   1 Units at 07/13/21 1802    hydrALAZINE (APRESOLINE) injection 20 mg  20 mg Intravenous Q6H PRN Evin Lopez DO   20 mg at 07/12/21 2015    fentaNYL (SUBLIMAZE) injection 12.5 mcg  12.5 mcg Intravenous Q2H PRN Evin Lopez DO   12.5 mcg at 07/14/21 0935    dextrose 50 % IV solution  12.5 g Intravenous PRN Evin Lopez DO   12.5 g at 07/12/21 1547    dextrose 5 % and 0.45 % sodium chloride infusion   Intravenous Continuous PRN Evin Lopez,  mL/hr at 07/10/21 1441 New Bag at 07/10/21 1441    [Held by provider] insulin regular (HUMULIN R;NOVOLIN R) 100 Units in sodium chloride 0.9 % 100 mL infusion  0.1 Units/kg/hr Intravenous Continuous Evin Lopez, DO 1.1 mL/hr at 07/10/21 1553 1.08 Units/hr at 07/10/21 1553    potassium chloride 10 mEq/100 mL IVPB (Peripheral Line)  10 mEq Intravenous PRN Марина Fletcher MD        magnesium sulfate 2000 mg in 50 mL IVPB premix  2,000 mg Intravenous PRN Марина Fletcher MD        sodium phosphate 10 mmol in dextrose 5 % 250 mL IVPB  10 mmol Intravenous PRN Марина Fletcher MD        epoetin enna-epbx (RETACRIT) injection 3,000 Units  3,000 Units Subcutaneous Once per day on Mon Wed Fri Марина Fletcher MD   3,000 Units at 07/12/21 0905    [Held by provider] dextrose 10 % infusion   Intravenous Continuous Dangelo Voss  mL/hr at 07/10/21 1711 New Bag at 07/10/21 1711     Allergies: Cefepime and Toradol [ketorolac tromethamine]  Past Medical History:   Diagnosis Date    Acute congestive heart failure ([address]     BC (acute kidney injury) ([address] 10/1/2019    Cardiac arrest ([address] 2/15/2021    Cephalgia 10/9/2019    Chronic kidney disease     Depression     Diabetes mellitus ([address]     Diabetic gastroparesis associated with type 1 diabetes mellitus ([address] 12/17/2018    Diabetic ketoacidosis ([address] 8/27/2011    Diabetic ketoacidosis with coma associated with type 1 diabetes mellitus ([address] 6/26/2013    Diabetic polyneuropathy associated with type 1 diabetes mellitus ([address] 12/27/2020    Drug use complicating pregnancy in third trimester     Endocarditis 10/31/2020    ESRD (end stage renal disease) ([address] 9/29/2020    Hemodialysis patient ([address]     History of blood transfusion 11/2019    Hyperlipidemia 10/8/2020    Hyperosmolar hyperglycemic state (HHS) ([facility] [address] 11/20/2020    Hypothyroidism 10/8/2020    Iron deficiency anemia 10/1/2019    MDRO (multiple drug resistant organisms) resistance     MRSA (methicillin resistant Staphylococcus aureus)     back wound abcess    Non compliance w medication regimen 3/30/2016    Other disorders of kidney and ureter     Pregnancy 3/30/2016    16 weeks    Previous  delivery affecting pregnancy, antepartum 3/2/2017    Previous stillbirth or demise, antepartum 2016    Seizure ([address] 2020    Severe pre-eclampsia in third trimester 2016    Shock liver 2/15/2021    Valvular endocarditis 11/10/2020    This Diagnosis was added to the Problem List based on transcribed orders from Dr. Cristian Sanders        Past Surgical History:   Procedure Laterality Date    BACK SURGERY      abscess   [address]      x2    CHOLECYSTECTOMY, LAPAROSCOPIC N/A 2019    CHOLECYSTECTOMY LAPAROSCOPIC performed by Darvin Benitez MD at [address] [place] [address] N/A 2018    COLONOSCOPY WITH BIOPSY performed by Annie Elias MD at [address] [place] [address] COLONOSCOPY N/A 2018    COLONOSCOPY WITH BIOPSY performed by Tyler Yun MD at [address]  2012    EF 57%    ECHO COMPL W DOP COLOR FLOW  6/10/2013         EMBOLECTOMY N/A 2020    [address] [place] [address], [address] [place] [address], YULISSA -- REQS ROOM 3 performed by Milad Tay MD at [address] [place] [address] Left 2021    LEFT LEG INCISION AND DRAINAGE, DEBRIDEMENT, WOUND VAC APPLICATION performed by Yuliana Wallace DPM at [address] [place] [address] Left 2021    LEFT LEG DEBRIDEMENT BIOPSY POSS APPLICATION WOUND VAC performed by Yuliana Wallace DPM at [place] [address] N/A 2020    LAPAROSCOPIC INSERTION PERITONEAL DIALYSIS CATHETER performed by Magda Reyes MD at [facility] ESOPHAGOGASTRODUODENOSCOPY TRANSORAL DIAGNOSTIC N/A 2018    EGD ESOPHAGOGASTRODUODENOSCOPY performed by Annie Elias MD at [address] [place] [address] TRANSESOPHAGEAL ECHOCARDIOGRAM N/A 10/19/2020    TRANSESOPHAGEAL ECHOCARDIOGRAM WITH BUBBLE STUDY performed by Joelle Sheth MD at [facility] TUNNELED VENOUS PORT PLACEMENT  2018    UPPER GASTROINTESTINAL ENDOSCOPY  2018    EGD BIOPSY performed by Tyler Yun MD at [facility] N/A 10/11/2019    EGD ESOPHAGOGASTRODUODENOSCOPY performed by Debo Briones DO at [address] [facility] History   Problem Relation Age of Onset    Asthma Mother     Hypertension Mother     High Blood Pressure Mother     Diabetes Mother     Asthma Brother     High Blood Pressure Father      Social History     Tobacco Use    Smoking status: Former Smoker     Packs/day: 0.50     Years: 6.00     Pack years: 3.00     Types: Cigarettes     Quit date: 2021     Years since quittin.4    Smokeless tobacco: Never Used    Tobacco comment: vaper cig   Substance Use Topics    Alcohol use: No      Urine Drug Screening:  No illicit drugs noted. OARRS report:  Hx of multiple percocet 7 day scripts. PHYSICAL EXAMINATION:      /83   Pulse 109   Temp 98 °F (36.7 °C) (Oral)   Resp 16   Ht 5' 4\" (1.626 m)   Wt 156 lb (70.8 kg)   SpO2 99%   BMI 26.78 kg/m²     General:      General appearance:   pleasant. , in mild discomfort and A & O x3  Build:Normal Weight    HEENT:    Head:normocephalic and atraumatic  Sclera: icterus absent,    Lungs:    Breathing:Normal expansion. No wheezing. Extremities:    Tremors:None bilaterally upper and lower  Range of motion:Generally normal shoulders, pain with internal rotation of hips not done.   Intact:Yes  Edema:Normal.    Healed wound to right lower calf area  + Hypersensitive on exam    Neurological:    Sensory: hypersenstive to light touch    Dermatology:    Skin:  Healed wound to right lower calf area    Impression:    Bilateral lower leg pain - specifically calf and foot pain bilaterally (\"achy\")  ESRD on PD  Hx of uncontrolled type 1 DM  Hx of cardiac arrest  Hx of substance abuse per notes    Current inpatient pain regimen:  Fentanyl 12.5 mcg IV Q 2 hours prn (patient states that this lasts only 30 minutes)    Recommendations:   Add lyrica 25 mg TID for neuropathic pain     This case was discussed with Dr. Liane Cote MD.

## 2021-07-14 NOTE — PLAN OF CARE
Problem: Falls - Risk of:  Goal: Will remain free from falls  Description: Will remain free from falls  7/14/2021 1707 by Nilam Leonardo RN  Outcome: Met This Shift    Goal: Absence of physical injury  Description: Absence of physical injury  7/14/2021 1707 by Nilam Leonardo RN  Outcome: Met This Shift    Problem: Serum Glucose Level - Abnormal:  Goal: Ability to maintain appropriate glucose levels will improve  Description: Ability to maintain appropriate glucose levels will improve  7/14/2021 1707 by Nilam Leonardo RN  Outcome: Met This Shift    Problem: Pain:  Goal: Pain level will decrease  Description: Pain level will decrease  7/14/2021 1707 by Nilam Leonardo RN  Outcome: Met This Shift  Goal: Control of acute pain  Description: Control of acute pain  7/14/2021 1707 by Nilam Leonardo RN  Outcome: Met This Shift    Goal: Control of chronic pain  Description: Control of chronic pain  7/14/2021 1707 by Nilam Leonardo RN  Outcome: Met This Shift     Problem: Skin Integrity:  Goal: Will show no infection signs and symptoms  Description: Will show no infection signs and symptoms  7/14/2021 1707 by Nilam Leonardo RN  Outcome: Met This Shift    Goal: Absence of new skin breakdown  Description: Absence of new skin breakdown  7/14/2021 1707 by Nilam Leonardo RN  Outcome: Met This Shift

## 2021-07-14 NOTE — PROGRESS NOTES
Physician notified because pt blood sugar is 420 this AM and pt is NPO for EGD this afternoon. See orders.

## 2021-07-14 NOTE — PROGRESS NOTES
[address] [place], [place] [address]ist   Progress Note    Admitting Date and Time: 7/9/2021  7:55 PM  Admit Dx: Lactic acid acidosis [E87.2]    Subjective:    7/11: Pt feels a little better, today she is more verbal than yesterday. Patient complains of headache, which she blames on her elevated blood pressure. 7/12:  Patient complaining that stomach is burning all the time. She states it is hard to eat. 7/13: Patient n.p.o. for EGD this afternoon. Patient did develop hypoglycemia as well I did instruct nursing to give patient her scheduled Lantus and she also got some coverage for elevated sugar this morning she was above 400. Patient is now needed half an amp D50 and we put her on D5 half-normal at 125 mL/hr. repeat blood sugars are now above 100    Per RN: patient seen by pain management and they added Lyrica 25 tid.      pregabalin  25 mg Oral TID    cefdinir  300 mg Oral Daily    pantoprazole  40 mg Intravenous BID    And    sodium chloride (PF)  10 mL Intravenous BID    sucralfate  1 g Oral 4 times per day    polyethylene glycol  17 g Oral BID    insulin lispro  0-3 Units Subcutaneous Nightly    hydrALAZINE  50 mg Oral 3 times per day    carvedilol  6.25 mg Oral BID WC    ARIPiprazole  5 mg Oral Nightly    insulin glargine  7 Units Subcutaneous Daily    levothyroxine  75 mcg Oral Daily    linezolid  600 mg Oral 2 times per day    mirtazapine  15 mg Oral Nightly    rOPINIRole  0.25 mg Oral Nightly    sevelamer  800 mg Oral TID WC    sodium chloride flush  10 mL Intravenous 2 times per day    heparin (porcine)  5,000 Units Subcutaneous 3 times per day    insulin lispro  0-6 Units Subcutaneous TID WC    epoetin nena-epbx  3,000 Units Subcutaneous Once per day on Mon Wed Fri     loperamide, 4 mg, BID PRN  calcium carbonate, 500 mg, TID PRN  glucose, 15 g, PRN  glucagon (rDNA), 1 mg, PRN  dextrose, 100 mL/hr, PRN  sodium chloride flush, 10 mL, PRN  sodium chloride, 25 mL, PRN  polyethylene glycol, 17 g, Daily PRN  acetaminophen, 650 mg, Q6H PRN   Or  acetaminophen, 650 mg, Q6H PRN  prochlorperazine, 10 mg, Q6H PRN  hydrALAZINE, 20 mg, Q6H PRN  fentanNYL, 12.5 mcg, Q2H PRN  dextrose, 12.5 g, PRN  dextrose 5 % and 0.45 % NaCl, , Continuous PRN  potassium chloride, 10 mEq, PRN  magnesium sulfate, 2,000 mg, PRN  sodium phosphate IVPB, 10 mmol, PRN         Objective:    /69   Pulse 99   Temp 98 °F (36.7 °C) (Oral)   Resp 16   Ht 5' 4\" (1.626 m)   Wt 156 lb (70.8 kg)   SpO2 99%   BMI 26.78 kg/m²   General Appearance: alert  and in no acute distress  Skin: warm and dry  Head: normocephalic and atraumatic  Eyes: pupils equal, round, and reactive to light, extraocular eye movements intact, conjunctivae normal  Neck: neck supple and non tender without mass   Pulmonary/Chest: clear to auscultation bilaterally- no wheezes, rales or rhonchi, normal air movement, no respiratory distress  Cardiovascular: normal rate, normal S1 and S2 and no carotid bruits  Abdomen: soft, mild epigastric tenderness, non-distended, normal bowel sounds   Extremities: no cyanosis, no clubbing and no edema  Neurologic: no cranial nerve deficit and speech normal      Recent Labs     07/12/21  0509 07/13/21  0747 07/14/21  0750    137 135   K 3.5 3.9 3.9    100 98   CO2 24 24 24   BUN 33* 28* 33*   CREATININE 7.4* 6.8* 7.2*   GLUCOSE 186* 198* 402*   CALCIUM 8.7 8.8 8.3*       No results for input(s): ALKPHOS, PROT, LABALBU, BILITOT, AST, ALT in the last 72 hours.     Recent Labs     07/12/21  0509 07/14/21  0750   WBC 5.4 5.7   RBC 2.64* 2.60*   HGB 8.4* 8.0*   HCT 25.2* 25.4*   MCV 95.5 97.7   MCH 31.8 30.8   MCHC 33.3 31.5*   RDW 15.5* 16.1*    149   MPV 10.1 10.2        Culture, Urine [[id number]] (Abnormal)  Collected: 07/11/21 1627     Specimen: Urine, clean catch Updated: 07/13/21 0746      Organism Serratia marcescens      Urine Culture, Routine 75,000 CFU/ml     Narrative:       Source: URINE       Site:                7/13/2021  7:46 AM - Miranda Perez Incoming Results From [facility]    Specimen Information: Urine, clean catch        Component Collected Lab   Organism Abnormal  07/11/2021  4:27 PM [facility]   Serratia marcescens    Urine Culture, Routine 07/11/2021  4:27 PM [facility]   75,000 CFU/ml    Testing Performed By    Lab - Abbreviation Name Director Address Valid Date Range   49- - [facility] [address] [facility] Eloise Hager MD [address] [place] [address]. [address] [place] [address] 12/03/19 1502-Present   Narrative  Performed by: [facility]  Source: URINE       Site:              Susceptibility    Serratia marcescens (1)    Antibiotic Interpretation DEENA Status    ceFAZolin Resistant >=^64 mcg/mL     cefepime Sensitive <=^0.12 mcg/mL     cefTRIAXone Sensitive <=^0.25 mcg/mL     ertapenem Sensitive <=^0.12 mcg/mL     gentamicin Sensitive <=^1 mcg/mL     levofloxacin Sensitive <=^0.12 mcg/mL     nitrofurantoin Resistant ^128 mcg/mL     trimethoprim-sulfamethoxazole              Radiology:   NM GASTRIC EMPTYING   Final Result   Severely delayed gastric emptying. XR CHEST PORTABLE   Final Result   No evidence of active cardiopulmonary pathology. CT ABDOMEN PELVIS WO CONTRAST Additional Contrast? None   Final Result   No evidence of acute abdominal or pelvic pathology. Peritoneal fluid likely related to peritoneal dialysis with dialysis catheter   in the right side of the pelvis. Small hiatal hernia. Large amount of stool in the colon which may reflect constipation.              Assessment:  Principal Problem:    Lactic acid acidosis  Active Problems:    ESRD on peritoneal dialysis ([facility]    Hypertension    Hypothyroidism    Diabetic ketoacidosis without coma associated with type 1 diabetes mellitus (HCC)    Iron deficiency anemia    Hyperlipidemia    Major depressive disorder  Resolved Problems:    * No resolved hospital problems. *      Plan:    1. DKA, type I DM, uncontrolled now with hypoglycemia - was transferred to ICU for uncontrolled BG >500  and elevated lactic acid - blood glucose now better controlled - insulin drip discontinued - beta hydroxy was elevated - A1c 8.8 - patient transferred to [facility] yesterday Monday. With hypoglycemia this morning as given her Lantus and coverage while being n.p.o. Patient given half amp D50 as well as placed on D5 half-normal until after procedure.     2. Hypertension- uncontrolled - despite resuming home dose antihypertensive agents. Hydralazine increased to 50 mg tid. Coreg started Monday replacing metoprolol. 3. Persistent nausea/epigastric pain with early satiety--patient on Protonix 40 mg daily. Will consult GI for consideration of EGD. Last had on 10/2019 which was essentially wnl. GI increased IV PPI twice daily and order gastric emptying study. This showed delayed gastric emptying. Patient does take Reglan as an outpatient but has not been ordered that here. I confirmed with her [facility] pharmacy and she is on 5 mg 3 times a day. GI taking patient today for EGD.     4. VRE UTI form last admission now with urine Cx growing Serratia marcens- on oral Linezolid from last admission and is too complete 2 week course from 7/2 Checked bladder scan for urinary retention and PVR was zero . However, since having ongoing suprapubic tenderness will proceed with treatment with cefdinir 300 mg daily.     5. Diarrhea - no diarrhea therefore doubt C diff. She has chronic loose stool for which she often takes loperamide. Patient requested 4 mg daily as needed and this was ordered yesterday     6. ESRD on PD - nephrology following - exchanges will be ordered by renal      7. Hypothyroidism - Levothyroxine      8. Hyperlipidemia - healthy diet      9. Iron deficiency anemia - H&H stable     10.  Major depressive disorder- pleasant - no changes made to current regimen       NOTE: This report was transcribed using voice recognition software. Every effort was made to ensure accuracy; however, inadvertent computerized transcription errors may be present.      Electronically signed by Salas Seo MD on 7/14/2021 at 2:26 PM

## 2021-07-14 NOTE — PROGRESS NOTES
CCPD tx initiated using aseptic technique. Dressing changed. Draining pale yellow effluent.  No issues noted

## 2021-07-15 VITALS
DIASTOLIC BLOOD PRESSURE: 92 MMHG | HEIGHT: 64 IN | BODY MASS INDEX: 26.61 KG/M2 | WEIGHT: 155.87 LBS | HEART RATE: 113 BPM | RESPIRATION RATE: 14 BRPM | SYSTOLIC BLOOD PRESSURE: 120 MMHG | TEMPERATURE: 98.5 F | OXYGEN SATURATION: 98 %

## 2021-07-15 LAB
ANION GAP SERPL CALCULATED.3IONS-SCNC: 12 MMOL/L (ref 7–16)
BUN BLDV-MCNC: 29 MG/DL (ref 6–20)
CALCIUM SERPL-MCNC: 8 MG/DL (ref 8.6–10.2)
CHLORIDE BLD-SCNC: 96 MMOL/L (ref 98–107)
CO2: 23 MMOL/L (ref 22–29)
CREAT SERPL-MCNC: 6.7 MG/DL (ref 0.5–1)
GFR AFRICAN AMERICAN: 9
GFR NON-AFRICAN AMERICAN: 9 ML/MIN/1.73
GLUCOSE BLD-MCNC: 340 MG/DL (ref 74–99)
METER GLUCOSE: 166 MG/DL (ref 74–99)
METER GLUCOSE: 354 MG/DL (ref 74–99)
METER GLUCOSE: 368 MG/DL (ref 74–99)
METER GLUCOSE: 72 MG/DL (ref 74–99)
METER GLUCOSE: 89 MG/DL (ref 74–99)
METER GLUCOSE: 94 MG/DL (ref 74–99)
METER GLUCOSE: 94 MG/DL (ref 74–99)
POTASSIUM REFLEX MAGNESIUM: 3.8 MMOL/L (ref 3.5–5)
SODIUM BLD-SCNC: 131 MMOL/L (ref 132–146)

## 2021-07-15 PROCEDURE — 82962 GLUCOSE BLOOD TEST: CPT

## 2021-07-15 PROCEDURE — 6370000000 HC RX 637 (ALT 250 FOR IP): Performed by: INTERNAL MEDICINE

## 2021-07-15 PROCEDURE — 99239 HOSP IP/OBS DSCHRG MGMT >30: CPT | Performed by: INTERNAL MEDICINE

## 2021-07-15 PROCEDURE — 6360000002 HC RX W HCPCS: Performed by: INTERNAL MEDICINE

## 2021-07-15 PROCEDURE — 6360000002 HC RX W HCPCS: Performed by: HOSPITALIST

## 2021-07-15 PROCEDURE — 2580000003 HC RX 258: Performed by: INTERNAL MEDICINE

## 2021-07-15 PROCEDURE — 36415 COLL VENOUS BLD VENIPUNCTURE: CPT

## 2021-07-15 PROCEDURE — 80048 BASIC METABOLIC PNL TOTAL CA: CPT

## 2021-07-15 PROCEDURE — 2580000003 HC RX 258: Performed by: HOSPITALIST

## 2021-07-15 PROCEDURE — 6370000000 HC RX 637 (ALT 250 FOR IP): Performed by: HOSPITALIST

## 2021-07-15 PROCEDURE — C9113 INJ PANTOPRAZOLE SODIUM, VIA: HCPCS | Performed by: HOSPITALIST

## 2021-07-15 RX ORDER — 0.9 % SODIUM CHLORIDE 0.9 %
500 INTRAVENOUS SOLUTION INTRAVENOUS ONCE
Status: DISCONTINUED | OUTPATIENT
Start: 2021-07-15 | End: 2021-07-15 | Stop reason: HOSPADM

## 2021-07-15 RX ORDER — METOCLOPRAMIDE HYDROCHLORIDE 5 MG/ML
5 INJECTION INTRAMUSCULAR; INTRAVENOUS
Status: DISCONTINUED | OUTPATIENT
Start: 2021-07-15 | End: 2021-07-15 | Stop reason: HOSPADM

## 2021-07-15 RX ORDER — LOPERAMIDE HYDROCHLORIDE 2 MG/1
4 CAPSULE ORAL 2 TIMES DAILY PRN
Qty: 30 CAPSULE | Refills: 0
Start: 2021-07-15 | End: 2021-07-25

## 2021-07-15 RX ORDER — CEFDINIR 300 MG/1
300 CAPSULE ORAL DAILY
Qty: 4 CAPSULE | Refills: 0 | OUTPATIENT
Start: 2021-07-16 | End: 2021-07-15

## 2021-07-15 RX ORDER — 0.9 % SODIUM CHLORIDE 0.9 %
500 INTRAVENOUS SOLUTION INTRAVENOUS ONCE
Status: COMPLETED | OUTPATIENT
Start: 2021-07-15 | End: 2021-07-15

## 2021-07-15 RX ORDER — HYDRALAZINE HYDROCHLORIDE 50 MG/1
50 TABLET, FILM COATED ORAL EVERY 8 HOURS SCHEDULED
Qty: 90 TABLET | Refills: 0 | Status: ON HOLD | OUTPATIENT
Start: 2021-07-15 | End: 2021-08-11 | Stop reason: HOSPADM

## 2021-07-15 RX ORDER — OXYCODONE HYDROCHLORIDE AND ACETAMINOPHEN 5; 325 MG/1; MG/1
1 TABLET ORAL EVERY 6 HOURS PRN
Qty: 12 TABLET | Refills: 0 | Status: SHIPPED | OUTPATIENT
Start: 2021-07-15 | End: 2021-07-18

## 2021-07-15 RX ORDER — CARVEDILOL 6.25 MG/1
6.25 TABLET ORAL 2 TIMES DAILY WITH MEALS
Qty: 60 TABLET | Refills: 0 | Status: ON HOLD | OUTPATIENT
Start: 2021-07-15 | End: 2021-08-26

## 2021-07-15 RX ORDER — OXYCODONE HYDROCHLORIDE AND ACETAMINOPHEN 5; 325 MG/1; MG/1
1 TABLET ORAL EVERY 4 HOURS PRN
Status: DISCONTINUED | OUTPATIENT
Start: 2021-07-15 | End: 2021-07-15 | Stop reason: HOSPADM

## 2021-07-15 RX ORDER — PREGABALIN 25 MG/1
25 CAPSULE ORAL DAILY
Qty: 30 CAPSULE | Refills: 0 | Status: SHIPPED | OUTPATIENT
Start: 2021-07-16 | End: 2021-09-28 | Stop reason: ALTCHOICE

## 2021-07-15 RX ORDER — CEFDINIR 300 MG/1
300 CAPSULE ORAL DAILY
Qty: 4 CAPSULE | Refills: 0 | Status: SHIPPED | OUTPATIENT
Start: 2021-07-16 | End: 2021-07-20

## 2021-07-15 RX ADMIN — OXYCODONE AND ACETAMINOPHEN 1 TABLET: 5; 325 TABLET ORAL at 16:57

## 2021-07-15 RX ADMIN — SUCRALFATE 1 G: 1 TABLET ORAL at 06:12

## 2021-07-15 RX ADMIN — FENTANYL CITRATE 12.5 MCG: 50 INJECTION INTRAMUSCULAR; INTRAVENOUS at 09:49

## 2021-07-15 RX ADMIN — SEVELAMER CARBONATE 800 MG: 800 TABLET, FILM COATED ORAL at 13:17

## 2021-07-15 RX ADMIN — CEFDINIR 300 MG: 300 CAPSULE ORAL at 08:34

## 2021-07-15 RX ADMIN — SODIUM CHLORIDE 500 ML: 9 INJECTION, SOLUTION INTRAVENOUS at 14:52

## 2021-07-15 RX ADMIN — LINEZOLID 600 MG: 600 TABLET, FILM COATED ORAL at 13:16

## 2021-07-15 RX ADMIN — LEVOTHYROXINE SODIUM 75 MCG: 75 TABLET ORAL at 06:12

## 2021-07-15 RX ADMIN — INSULIN GLARGINE 7 UNITS: 100 INJECTION, SOLUTION SUBCUTANEOUS at 09:48

## 2021-07-15 RX ADMIN — METOCLOPRAMIDE HYDROCHLORIDE 5 MG: 5 INJECTION INTRAMUSCULAR; INTRAVENOUS at 18:15

## 2021-07-15 RX ADMIN — HYDRALAZINE HYDROCHLORIDE 50 MG: 50 TABLET ORAL at 06:12

## 2021-07-15 RX ADMIN — DEXTROSE AND SODIUM CHLORIDE: 5; 450 INJECTION, SOLUTION INTRAVENOUS at 00:12

## 2021-07-15 RX ADMIN — PANTOPRAZOLE SODIUM 40 MG: 40 INJECTION, POWDER, FOR SOLUTION INTRAVENOUS at 08:34

## 2021-07-15 RX ADMIN — SEVELAMER CARBONATE 800 MG: 800 TABLET, FILM COATED ORAL at 08:35

## 2021-07-15 RX ADMIN — SODIUM CHLORIDE, PRESERVATIVE FREE 10 ML: 5 INJECTION INTRAVENOUS at 16:30

## 2021-07-15 RX ADMIN — PREGABALIN 25 MG: 25 CAPSULE ORAL at 08:34

## 2021-07-15 RX ADMIN — SUCRALFATE 1 G: 1 TABLET ORAL at 13:16

## 2021-07-15 RX ADMIN — CARVEDILOL 6.25 MG: 6.25 TABLET, FILM COATED ORAL at 08:34

## 2021-07-15 RX ADMIN — METOCLOPRAMIDE HYDROCHLORIDE 5 MG: 5 INJECTION INTRAMUSCULAR; INTRAVENOUS at 13:17

## 2021-07-15 RX ADMIN — SEVELAMER CARBONATE 800 MG: 800 TABLET, FILM COATED ORAL at 18:15

## 2021-07-15 RX ADMIN — INSULIN LISPRO 5 UNITS: 100 INJECTION, SOLUTION INTRAVENOUS; SUBCUTANEOUS at 09:48

## 2021-07-15 RX ADMIN — SUCRALFATE 1 G: 1 TABLET ORAL at 19:05

## 2021-07-15 RX ADMIN — SUCRALFATE 1 G: 1 TABLET ORAL at 00:08

## 2021-07-15 ASSESSMENT — PAIN DESCRIPTION - PAIN TYPE
TYPE: CHRONIC PAIN

## 2021-07-15 ASSESSMENT — PAIN DESCRIPTION - LOCATION
LOCATION: GENERALIZED

## 2021-07-15 ASSESSMENT — PAIN SCALES - GENERAL
PAINLEVEL_OUTOF10: 8
PAINLEVEL_OUTOF10: 5
PAINLEVEL_OUTOF10: 7

## 2021-07-15 ASSESSMENT — PAIN DESCRIPTION - FREQUENCY: FREQUENCY: CONTINUOUS

## 2021-07-15 ASSESSMENT — PAIN DESCRIPTION - DESCRIPTORS: DESCRIPTORS: ACHING;DISCOMFORT

## 2021-07-15 NOTE — CARE COORDINATION
7-15-Cm note: pt being discharged home, denies any needs for homegoing, her mom will provide transportation .  Electronically signed by Savi Landrum RN on 7/15/2021 at 4:04 PM

## 2021-07-15 NOTE — PROGRESS NOTES
CLINICAL PHARMACY NOTE: MEDS TO BEDS    Total # of Prescriptions Filled: 2   The following medications were delivered to the patient:  · Fluconazole 100 mg  · Linezolid 600 mg    Additional Documentation:

## 2021-07-15 NOTE — PROGRESS NOTES
PROGRESS NOTE  By Viktoriya Romero M.D. [facility]  Dr. Maida Lopez M.D.,  Dr. Queta Haile M.D.,   RADHA Figueredo Ma.O.,  Dr. Caity Johnson M.D.,  Dr. Radha Guthrie D.O.,  Min Hancock D.O. Wilton Flores  36 y.o.  female    SUBJECTIVE:  No new complaints. OBJECTIVE:    BP (!) 143/85   Pulse 111   Temp 98.5 °F (36.9 °C) (Oral)   Resp 12   Ht 5' 4\" (1.626 m)   Wt 155 lb 13.8 oz (70.7 kg)   SpO2 98%   BMI 26.75 kg/m²     General: NAD/female patient  HEENT: Anicteric sclera/moist oral mucosa  Neck: Supple with trachea midline  Chest: Symmetrical excursion/nonlabored respirations  Cor: Regular/S1-S2  Abd.: Soft and nondistended  Extr.:  Decreased muscle tone and bulk throughout  Skin: Warm and dry/anicteric      DATA:    Monitor data reviewed -sinus tachycardia noted.        Lab Results   Component Value Date    WBC 5.7 07/14/2021    RBC 2.60 07/14/2021    HGB 8.0 07/14/2021    HCT 25.4 07/14/2021    MCV 97.7 07/14/2021    MCH 30.8 07/14/2021    MCHC 31.5 07/14/2021    RDW 16.1 07/14/2021     07/14/2021    MPV 10.2 07/14/2021     Lab Results   Component Value Date     07/15/2021    K 3.8 07/15/2021    CL 96 07/15/2021    CO2 23 07/15/2021    BUN 29 07/15/2021    CREATININE 6.7 07/15/2021    CALCIUM 8.0 07/15/2021    PROT 6.1 07/09/2021    LABALBU 3.4 07/09/2021    LABALBU 4.1 05/18/2012    BILITOT 0.2 07/09/2021    ALKPHOS 190 07/09/2021    AST 30 07/09/2021    ALT 22 07/09/2021     Lab Results   Component Value Date    LIPASE 11 07/09/2021     Lab Results   Component Value Date    AMYLASE 40 12/14/2018         ASSESSMENT/PLAN:    1.  Nausea/vomiting/epigastric pain  -Abnormal nuclear medicine gastric emptying study consistent with gastroparesis  -EGD 7/14 revealing esophagitis and retained food in the stomach but no gastric outlet obstruction  -Recommend aggressive management of patient risk factors (diabetes)  -Recommend small frequent meals of low-fat diet    2.  Anemia  -H&H at baseline with decreased since admission likely related to hemodilution  -Normocytic  -No significant iron deficiency  -Monitor H&H  -EGD as above  -Outpatient colonoscopy    No immediate plans for intervention from GI POV. Follow-up in the office in 2 to 3 weeks after discharge -patient to call for appointment and with questions/concerns at [phone]. Will see PRN in the hospital -please, call with questions/concerns. Thank you for the opportunity to participate in the care of  Nathan Parish MD  7/15/2021  10:57 AM    NOTE:  This report was transcribed using voice recognition software. Every effort was made to ensure accuracy; however, inadvertent computerized transcription errors may be present.

## 2021-07-15 NOTE — PLAN OF CARE
Problem: Discharge Planning:  Goal: Discharged to appropriate level of care  Description: Discharged to appropriate level of care  7/15/2021 1150 by Andi Veloz RN  Outcome: Ongoing  7/15/2021 0515 by Karlos Dietrich RN  Outcome: Ongoing     Problem: Falls - Risk of:  Goal: Will remain free from falls  Description: Will remain free from falls  7/15/2021 1150 by Andi Veloz RN  Outcome: Met This Shift  7/15/2021 0515 by Karlos Dietrich RN  Outcome: Met This Shift  Goal: Absence of physical injury  Description: Absence of physical injury  7/15/2021 1150 by Andi Veloz RN  Outcome: Met This Shift  7/15/2021 0515 by Karlos Dietrich RN  Outcome: Met This Shift     Problem: Serum Glucose Level - Abnormal:  Goal: Ability to maintain appropriate glucose levels will improve  Description: Ability to maintain appropriate glucose levels will improve  7/15/2021 1150 by Andi Veloz RN  Outcome: Met This Shift  7/15/2021 0515 by Karlos Dietrich RN  Outcome: Met This Shift     Problem: Pain:  Goal: Pain level will decrease  Description: Pain level will decrease  7/15/2021 1150 by Andi Veloz RN  Outcome: Met This Shift  7/15/2021 0515 by Karlos Dietrich RN  Outcome: Met This Shift  Goal: Control of acute pain  Description: Control of acute pain  7/15/2021 1150 by Andi Veloz RN  Outcome: Met This Shift  7/15/2021 0515 by Karlos Dietrich RN  Outcome: Met This Shift  Goal: Control of chronic pain  Description: Control of chronic pain  7/15/2021 1150 by Andi Veloz RN  Outcome: Met This Shift  7/15/2021 0515 by Karlos Dietrich RN  Outcome: Met This Shift     Problem: Skin Integrity:  Goal: Will show no infection signs and symptoms  Description: Will show no infection signs and symptoms  7/15/2021 1150 by Andi Veloz RN  Outcome: Met This Shift  7/15/2021 0515 by Karlos Dietrich RN  Outcome: Met This Shift  Goal: Absence of new skin breakdown  Description: Absence of new skin breakdown  7/15/2021 1150 by Andi Veloz RN  Outcome: Met This Shift  7/15/2021 0515 by Marcella Encinas RN  Outcome: Met This Shift

## 2021-07-15 NOTE — PLAN OF CARE
Problem: Falls - Risk of:  Goal: Will remain free from falls  Description: Will remain free from falls  7/15/2021 0515 by Gonzalo Lino RN  Outcome: Met This Shift     Problem: Falls - Risk of:  Goal: Absence of physical injury  Description: Absence of physical injury  7/15/2021 0515 by Gonzalo Lino RN  Outcome: Met This Shift     Problem: Discharge Planning:  Goal: Discharged to appropriate level of care  Description: Discharged to appropriate level of care  7/15/2021 0515 by Gonzalo Lino RN  Outcome: Ongoing     Problem: Serum Glucose Level - Abnormal:  Goal: Ability to maintain appropriate glucose levels will improve  Description: Ability to maintain appropriate glucose levels will improve  7/15/2021 0515 by Gonzalo Lino RN  Outcome: Met This Shift     Problem: Pain:  Goal: Pain level will decrease  Description: Pain level will decrease  7/15/2021 0515 by Gonzalo Lino RN  Outcome: Met This Shift     Problem: Pain:  Goal: Control of acute pain  Description: Control of acute pain  7/15/2021 0515 by Gonzalo Lino RN  Outcome: Met This Shift     Problem: Pain:  Goal: Control of chronic pain  Description: Control of chronic pain  7/15/2021 0515 by Gonzalo Lino RN  Outcome: Met This Shift     Problem: Skin Integrity:  Goal: Will show no infection signs and symptoms  Description: Will show no infection signs and symptoms  7/15/2021 0515 by Gonzalo Lino RN  Outcome: Met This Shift     Problem: Skin Integrity:  Goal: Absence of new skin breakdown  Description: Absence of new skin breakdown  7/15/2021 0515 by Gonzalo Lino RN  Outcome: Met This Shift

## 2021-07-15 NOTE — DISCHARGE SUMMARY
[facility] Physician Discharge Summary       Antonio Garcia, [address] [place] [address]  [phone]    Schedule an appointment as soon as possible for a visit in 1 week  Hospital follow up    [address] [facility], [address] [place] [address] [place]  [phone]    Schedule an appointment as soon as possible for a visit in 2 weeks  Hospital follow up for gastroparesis      Activity level: As tolerate    Diet: ADULT DIET; Regular; 3 carb choices (45 gm/meal)    Labs:None    Condition at discharge: Stable     Dispo:Home      Patient ID:  Efrain Barnett  [id number]  19 y.o.  1990    Admit date: 7/9/2021    Discharge date and time:  7/15/2021  6:18 PM    Admission Diagnoses: Principal Problem:    Lactic acid acidosis  Active Problems:    ESRD on peritoneal dialysis ([address]    Hypertension    Hypothyroidism    Diabetic ketoacidosis without coma associated with type 1 diabetes mellitus ([address]    Iron deficiency anemia    Hyperlipidemia    Major depressive disorder    Early satiety  Resolved Problems:    * No resolved hospital problems. *      Discharge Diagnoses: Principal Problem:    Lactic acid acidosis  Active Problems:    ESRD on peritoneal dialysis ([address]    Hypertension    Hypothyroidism    Diabetic ketoacidosis without coma associated with type 1 diabetes mellitus ([address]    Iron deficiency anemia    Hyperlipidemia    Major depressive disorder    Early satiety  Resolved Problems:    * No resolved hospital problems. *      Consults:  IP CONSULT TO NEPHROLOGY  IP CONSULT TO CRITICAL CARE  IP CONSULT TO GI  IP CONSULT TO PAIN MANAGEMENT    Procedures: EGD 7/14    History of Present Illness:  43-year-old female with a history of ESRD on peritoneal dialysis, hypertension, hypothyroidism, prior substance abuse, diabetes mellitus type 1. Recently admitted to the hospital with UTI: Found to have VRE and was discharged on linezolid.   Had diarrhea and C. difficile was ordered but canceled as she did not have any loose bowels. However she is under the impression that she did have C. difficile and was getting treatment for it. Reports she continues to take the prescribed linezolid. Her diarrhea stopped while she was in the hospital.  Yesterday however she had 6 watery bowel movements, some sore throat, nasal congestion and nausea with continued vomiting. Therefore presented to ED for further evaluation. No specific abdominal pain but has generalized body aches. In the ED she received antiemetics but still had persistent vomiting therefore referred for admission.     Hospital Course: 35 yo female admitted as per above details. See outline below for additional details and issues addressed this admission:        1. DKA, type I DM, uncontrolled now with hypoglycemia - was transferred to ICU for uncontrolled BG >500  and elevated lactic acid - blood glucose now better controlled - insulin drip discontinued - beta hydroxy was elevated - A1c 8.8 - patient transferred to [facility] yesterday Monday. With hypoglycemia yesterday morning as given her Lantus and coverage while being n.p.o. Patient given half amp D50 as well as placed on D5 half-normal until after procedure. Patient back on home regimen.     2. Hypertension- uncontrolled - despite resuming home dose antihypertensive agents. Hydralazine increased to 50 mg tid. Coreg started Monday replacing metoprolol. Today, did have some hypotension so antihypertensives held today and she was given NS bolus of 500 ml. BP improved prior to discharge and she will resume antihypertensives tomorrow.     3. Persistent nausea/epigastric pain with early satiety due to diabetic gastroparesis--patient on Protonix 40 mg daily. Will consult GI for consideration of EGD. Last had on 10/2019 which was essentially wnl. GI increased IV PPI twice daily and order gastric emptying study. This showed delayed gastric emptying.   Patient does take Reglan as an outpatient but has not been ordered that here. I confirmed with her [facility] pharmacy and she is on 5 mg 3 times a day. GI did EGD yesterday which showed retained food in stomach. She was placed back on Reglan 5 mg IV before meals and bedtime and she will resume outpatient oral regimen at discharge.      4. VRE UTI form last admission now with urine Cx growing Serratia marcens- on oral Linezolid from last admission and is too complete 2 week course from 7/2 Checked bladder scan for urinary retention and PVR was zero . However, since having ongoing suprapubic tenderness will proceed with treatment with cefdinir 300 mg daily. Patient received 3 days of treatment in house and will send home to complete additional 4 days (for total of 7 day course).     5. Diarrhea - no diarrhea therefore doubt C diff. She has chronic loose stool for which she often takes loperamide. Patient requested 4 mg daily as needed and this was ordered     6. ESRD on PD - nephrology following - exchanges will be ordered by renal      7. Hypothyroidism - Levothyroxine      8. Hyperlipidemia - healthy diet      9. Iron deficiency anemia - H&H stable     10. Major depressive disorder- pleasant - no changes made to current regimen     11. Chronic pain--patient seen by pain management and they placed patient on Lyrica 25 mg tid. However, this was adjusted down to once daily because of her ESRD. She was given home-going Rx for this as well as 3 days Rx for Percocet 5/325 #12/NR.     Discharge Exam:  Vitals:    07/15/21 1422 07/15/21 1428 07/15/21 1607 07/15/21 1805   BP: (!) 86/51 (!) 84/52 108/68 (!) 120/92   Pulse: 113      Resp:       Temp:       TempSrc:       SpO2:       Weight:       Height:         General Appearance: alert  and in no acute distress  Skin: warm and dry  Head: normocephalic and atraumatic  Eyes: pupils equal, round, and reactive to light, extraocular eye movements intact, conjunctivae normal  Neck: neck supple and non tender without mass   Pulmonary/Chest: clear to auscultation bilaterally- no wheezes, rales or rhonchi, normal air movement, no respiratory distress  Cardiovascular: normal rate, normal S1 and S2 and no carotid bruits  Abdomen: soft, mild epigastric tenderness, non-distended, normal bowel sounds   Extremities: no cyanosis, no clubbing and no edema  Neurologic: no cranial nerve deficit and speech normal       I/O last 3 completed shifts: In: 2432 [P.O.:360; I.V.:2072]  Out: 2773   I/O this shift: In: 500 [I.V.:500]  Out: -       LABS:  Recent Labs     07/13/21  0747 07/14/21  0750 07/15/21  0716    135 131*   K 3.9 3.9 3.8    98 96*   CO2 24 24 23   BUN 28* 33* 29*   CREATININE 6.8* 7.2* 6.7*   GLUCOSE 198* 402* 340*   CALCIUM 8.8 8.3* 8.0*       Recent Labs     07/14/21  0750   WBC 5.7   RBC 2.60*   HGB 8.0*   HCT 25.4*   MCV 97.7   MCH 30.8   MCHC 31.5*   RDW 16.1*      MPV 10.2      Culture, Urine [[id number]] (Abnormal)  Collected: 07/11/21 1627     Specimen: Urine, clean catch Updated: 07/13/21 0746       Organism Serratia marcescens       Urine Culture, Routine 75,000 CFU/ml     Narrative:       Source: URINE       Site:                 7/13/2021  7:46 AM - Miranda Perez Incoming Results From [facility]           Specimen Information: Urine, clean catch         Component Collected Lab   Organism Abnormal  07/11/2021  4:27 PM [facility]   Serratia marcescens    Urine Culture, Routine 07/11/2021  4:27 PM [facility]   75,000 CFU/ml    Testing Performed By     Lab - Abbreviation Name Director Address Valid Date Range   49- - [address] [facility] Darby Montero MD [address] [place] [address].    [place] [address] 12/03/19 1502-Present   Narrative  Performed by: [address] [place] [address] Lab  Source: URINE       Site:              Susceptibility     Serratia marcescens (1)             Antibiotic Interpretation DEENA Status   ceFAZolin Resistant >=^64 mcg/mL       cefepime Sensitive <=^0.12 mcg/mL       cefTRIAXone Sensitive <=^0.25 mcg/mL       ertapenem Sensitive <=^0.12 mcg/mL       gentamicin Sensitive <=^1 mcg/mL       levofloxacin Sensitive <=^0.12 mcg/mL       nitrofurantoin Resistant ^128 mcg/mL       trimethoprim-sulfamethoxazole                  Imaging:      CT ABDOMEN PELVIS WO CONTRAST Additional Contrast? None    Result Date: 7/9/2021  EXAMINATION: CT OF THE ABDOMEN AND PELVIS WITHOUT CONTRAST 7/9/2021 8:34 pm TECHNIQUE: CT of the abdomen and pelvis was performed without the administration of intravenous contrast. Multiplanar reformatted images are provided for review. Dose modulation, iterative reconstruction, and/or weight based adjustment of the mA/kV was utilized to reduce the radiation dose to as low as reasonably achievable. COMPARISON: June 24 HISTORY: ORDERING SYSTEM PROVIDED HISTORY: abdominal pain TECHNOLOGIST PROVIDED HISTORY: Reason for exam:->abdominal pain Additional Contrast?->None Decision Support Exception - unselect if not a suspected or confirmed emergency medical condition->Emergency Medical Condition (MA) FINDINGS: Lower Chest: Atelectatic changes in the right lung base. No evidence of airspace consolidation or pleural effusions. Organs: The liver, spleen, adrenals, pancreas are within normal limits. Status post cholecystectomy. No evidence of urolithiasis or obstructive uropathy. GI/Bowel: Small hiatal hernia. No bowel obstruction or free intraperitoneal air. No evidence of colitis or diverticulitis. Large amount of stool in the colon which may reflect constipation. Pelvis: Uterus and urinary bladder within normal limits. Peritoneum/Retroperitoneum: Moderately large amount of peritoneal fluid, likely related to peritoneal dialysis, similar to the prior examination. Peritoneal dialysis catheter with the distal tip in the right side of the pelvis. Bones/Soft Tissues: No acute bony pathology.  Old right lower rib fractures. No evidence of acute abdominal or pelvic pathology. Peritoneal fluid likely related to peritoneal dialysis with dialysis catheter in the right side of the pelvis. Small hiatal hernia. Large amount of stool in the colon which may reflect constipation. XR CHEST PORTABLE    Result Date: 7/9/2021  EXAMINATION: ONE XRAY VIEW OF THE CHEST 7/9/2021 9:48 pm COMPARISON: Chest series from June 21, 2021 HISTORY: ORDERING SYSTEM PROVIDED HISTORY: Unresponsive TECHNOLOGIST PROVIDED HISTORY: Reason for exam:->unresponsive FINDINGS: Adequate and symmetric aeration of the lungs. There are no formed consolidations, pleural effusions, or pneumothoraces. Trachea and central mainstem bronchi appear clear. The cardiomediastinal silhouette and pulmonary vascularity appear within normal limits. Osseous and thoracic soft tissue structures demonstrate no acute findings. No evidence of active cardiopulmonary pathology. Patient Instructions:   Current Discharge Medication List      START taking these medications    Details   cefdinir (OMNICEF) 300 MG capsule Take 1 capsule by mouth daily for 4 days  Qty: 4 capsule, Refills: 0      loperamide (IMODIUM) 2 MG capsule Take 2 capsules by mouth 2 times daily as needed for Diarrhea  Qty: 30 capsule, Refills: 0      carvedilol (COREG) 6.25 MG tablet Take 1 tablet by mouth 2 times daily (with meals) Replaced Lopressor  Qty: 60 tablet, Refills: 0         CONTINUE these medications which have CHANGED    Details   hydrALAZINE (APRESOLINE) 50 MG tablet Take 1 tablet by mouth every 8 hours New higher dose  Qty: 90 tablet, Refills: 0         CONTINUE these medications which have NOT CHANGED    Details   insulin glargine (LANTUS) 100 UNIT/ML injection vial Inject 7 Units into the skin daily New change in dose and frequency  Qty: 1 vial, Refills: 3      insulin lispro, 1 Unit Dial, (HUMALOG KWIKPEN) 100 UNIT/ML SOPN Inject 3 units with meals + sliding scale.  MAX Refills: 0      epoetin nena-epbx (RETACRIT) 3000 UNIT/ML SOLN injection Inject 1 mL into the skin three times a week  Qty: 21.9 mL      !! blood glucose test strips (CONTOUR NEXT TEST) strip 1 each by In Vitro route 5 times daily As needed. Qty: 150 each, Refills: 5    Associated Diagnoses: Type 1 diabetes mellitus with other specified complication ([address] Insulin pump in place      metoclopramide (REGLAN) 5 MG tablet Take 5 mg by mouth 3 times daily        ! ! - Potential duplicate medications found. Please discuss with provider. STOP taking these medications       docusate sodium (COLACE) 100 MG capsule Comments:   Reason for Stopping:         metoprolol tartrate (LOPRESSOR) 25 MG tablet Comments:   Reason for Stopping:               Note that more than 30 minutes was spent in preparing discharge papers, discussing discharge with patient, medication review, etc.    NOTE: This report was transcribed using voice recognition software. Every effort was made to ensure accuracy; however, inadvertent computerized transcription errors may be present.      Signed:  Electronically signed by César Garcia MD on 7/15/2021 at 6:18 PM

## 2021-07-15 NOTE — PROGRESS NOTES
[facility] tx complete. 1382 UF yellow effluent noted. No fibrin. Pt disconnected and stay safe cap applied.

## 2021-07-16 ENCOUNTER — TELEPHONE (OUTPATIENT)
Dept: INTERNAL MEDICINE | Age: 31
End: 2021-07-16

## 2021-07-20 ENCOUNTER — TELEPHONE (OUTPATIENT)
Dept: INTERNAL MEDICINE | Age: 31
End: 2021-07-20

## 2021-07-20 NOTE — TELEPHONE ENCOUNTER
----- Message from Sarita Sep sent at 7/20/2021 10:11 AM EDT -----  Subject: Appointment Request    Reason for Call: Routine Existing Condition Follow Up (Diabetes)    QUESTIONS  Type of Appointment? Established Patient  Reason for appointment request? No appointments available during search  Additional Information for Provider? pt needs to come in for a follow up   appointment for diabetes management with Dr. Genet alvarado  ---------------------------------------------------------------------------  --------------  CALL BACK INFO  What is the best way for the office to contact you? OK to leave message on   voicemail  Preferred Call Back Phone Number? [phone]  ---------------------------------------------------------------------------  --------------  SCRIPT ANSWERS  Relationship to Patient? Self  Appointment reason? Well Care/Follow Ups  Select a Well Care/Follow Ups appointment reason? Adult Existing Condition   Follow Up [Diabetes, CHF, COPD, Hypertension/Blood Pressure Check]  (Is the patient requesting to be seen urgently for their symptoms?)? No  Is this follow up request related to routine Diabetes Management? Yes  Are you having any new concerns about your existing condition? No  Have you been diagnosed with, awaiting test results for, or told that you   are suspected of having COVID-19 (Coronavirus)? (If patient has tested   negative or was tested as a requirement for work, school, or travel and   not based on symptoms, answer no)? No  Do you currently have flu-like symptoms including fever or chills, cough,   shortness of breath, difficulty breathing, or new loss of taste or smell? No  Have you had close contact with someone with COVID-19 in the last 14 days? No  (Service Expert  click yes below to proceed with [employer] As Usual   Scheduling)?  Yes

## 2021-08-07 ENCOUNTER — HOSPITAL ENCOUNTER (INPATIENT)
Age: 31
LOS: 4 days | Discharge: HOME | DRG: 420 | End: 2021-08-11
Attending: EMERGENCY MEDICINE | Admitting: INTERNAL MEDICINE
Payer: COMMERCIAL

## 2021-08-07 DIAGNOSIS — E10.10 DIABETIC KETOACIDOSIS WITHOUT COMA ASSOCIATED WITH TYPE 1 DIABETES MELLITUS (HCC): Primary | ICD-10-CM

## 2021-08-07 LAB
ALBUMIN SERPL-MCNC: 2.4 G/DL (ref 3.5–5.2)
ALP BLD-CCNC: 230 U/L (ref 35–104)
ALT SERPL-CCNC: 23 U/L (ref 0–32)
ANION GAP SERPL CALCULATED.3IONS-SCNC: 30 MMOL/L (ref 7–16)
ANION GAP SERPL CALCULATED.3IONS-SCNC: 31 MMOL/L (ref 7–16)
ANION GAP SERPL CALCULATED.3IONS-SCNC: 34 MMOL/L (ref 7–16)
ANISOCYTOSIS: ABNORMAL
AST SERPL-CCNC: 13 U/L (ref 0–31)
BASOPHILS ABSOLUTE: 0.12 E9/L (ref 0–0.2)
BASOPHILS RELATIVE PERCENT: 0.8 % (ref 0–2)
BETA-HYDROXYBUTYRATE: >4.5 MMOL/L (ref 0.02–0.27)
BILIRUB SERPL-MCNC: <0.2 MG/DL (ref 0–1.2)
BUN BLDV-MCNC: 53 MG/DL (ref 6–20)
BUN BLDV-MCNC: 56 MG/DL (ref 6–20)
BUN BLDV-MCNC: 57 MG/DL (ref 6–20)
BURR CELLS: ABNORMAL
CALCIUM SERPL-MCNC: 6.6 MG/DL (ref 8.6–10.2)
CALCIUM SERPL-MCNC: 6.9 MG/DL (ref 8.6–10.2)
CALCIUM SERPL-MCNC: 7.5 MG/DL (ref 8.6–10.2)
CHLORIDE BLD-SCNC: 77 MMOL/L (ref 98–107)
CHLORIDE BLD-SCNC: 82 MMOL/L (ref 98–107)
CHLORIDE BLD-SCNC: 84 MMOL/L (ref 98–107)
CHP ED QC CHECK: YES
CO2: 11 MMOL/L (ref 22–29)
CO2: 13 MMOL/L (ref 22–29)
CO2: 13 MMOL/L (ref 22–29)
CREAT SERPL-MCNC: 8.6 MG/DL (ref 0.5–1)
CREAT SERPL-MCNC: 8.7 MG/DL (ref 0.5–1)
CREAT SERPL-MCNC: 8.9 MG/DL (ref 0.5–1)
EOSINOPHILS ABSOLUTE: 0 E9/L (ref 0.05–0.5)
EOSINOPHILS RELATIVE PERCENT: 0 % (ref 0–6)
GFR AFRICAN AMERICAN: 6
GFR AFRICAN AMERICAN: 7
GFR AFRICAN AMERICAN: 7
GFR NON-AFRICAN AMERICAN: 6 ML/MIN/1.73
GFR NON-AFRICAN AMERICAN: 7 ML/MIN/1.73
GFR NON-AFRICAN AMERICAN: 7 ML/MIN/1.73
GLUCOSE BLD-MCNC: 1184 MG/DL (ref 74–99)
GLUCOSE BLD-MCNC: 1188 MG/DL (ref 74–99)
GLUCOSE BLD-MCNC: 1341 MG/DL (ref 74–99)
GLUCOSE BLD-MCNC: NORMAL MG/DL
HCT VFR BLD CALC: 29.8 % (ref 34–48)
HEMOGLOBIN: 7.9 G/DL (ref 11.5–15.5)
HYPOCHROMIA: ABNORMAL
IMMATURE GRANULOCYTES #: 0.1 E9/L
IMMATURE GRANULOCYTES %: 0.7 % (ref 0–5)
LACTIC ACID: 3.6 MMOL/L (ref 0.5–2.2)
LIPASE: 11 U/L (ref 13–60)
LYMPHOCYTES ABSOLUTE: 0.59 E9/L (ref 1.5–4)
LYMPHOCYTES RELATIVE PERCENT: 4 % (ref 20–42)
MCH RBC QN AUTO: 30.2 PG (ref 26–35)
MCHC RBC AUTO-ENTMCNC: 26.5 % (ref 32–34.5)
MCV RBC AUTO: 113.7 FL (ref 80–99.9)
METER GLUCOSE: >500 MG/DL (ref 74–99)
METER GLUCOSE: >500 MG/DL (ref 74–99)
MONOCYTES ABSOLUTE: 0.5 E9/L (ref 0.1–0.95)
MONOCYTES RELATIVE PERCENT: 3.4 % (ref 2–12)
NEUTROPHILS ABSOLUTE: 13.61 E9/L (ref 1.8–7.3)
NEUTROPHILS RELATIVE PERCENT: 91.1 % (ref 43–80)
PDW BLD-RTO: 16.6 FL (ref 11.5–15)
PH VENOUS: 7.2 (ref 7.35–7.45)
PHOSPHORUS: 8.7 MG/DL (ref 2.5–4.5)
PLATELET # BLD: 322 E9/L (ref 130–450)
PMV BLD AUTO: 11.3 FL (ref 7–12)
POIKILOCYTES: ABNORMAL
POLYCHROMASIA: ABNORMAL
POTASSIUM REFLEX MAGNESIUM: 4.9 MMOL/L (ref 3.5–5)
POTASSIUM SERPL-SCNC: 4 MMOL/L (ref 3.5–5)
POTASSIUM SERPL-SCNC: 4.8 MMOL/L (ref 3.5–5)
RBC # BLD: 2.62 E12/L (ref 3.5–5.5)
SARS-COV-2, NAAT: NOT DETECTED
SODIUM BLD-SCNC: 124 MMOL/L (ref 132–146)
SODIUM BLD-SCNC: 125 MMOL/L (ref 132–146)
SODIUM BLD-SCNC: 126 MMOL/L (ref 132–146)
TARGET CELLS: ABNORMAL
TOTAL PROTEIN: 5.3 G/DL (ref 6.4–8.3)
WBC # BLD: 14.9 E9/L (ref 4.5–11.5)

## 2021-08-07 PROCEDURE — 87635 SARS-COV-2 COVID-19 AMP PRB: CPT

## 2021-08-07 PROCEDURE — 96375 TX/PRO/DX INJ NEW DRUG ADDON: CPT

## 2021-08-07 PROCEDURE — 99283 EMERGENCY DEPT VISIT LOW MDM: CPT

## 2021-08-07 PROCEDURE — 82800 BLOOD PH: CPT

## 2021-08-07 PROCEDURE — 84100 ASSAY OF PHOSPHORUS: CPT

## 2021-08-07 PROCEDURE — 6370000000 HC RX 637 (ALT 250 FOR IP)

## 2021-08-07 PROCEDURE — 2000000000 HC ICU R&B

## 2021-08-07 PROCEDURE — 85025 COMPLETE CBC W/AUTO DIFF WBC: CPT

## 2021-08-07 PROCEDURE — 6360000002 HC RX W HCPCS

## 2021-08-07 PROCEDURE — 80053 COMPREHEN METABOLIC PANEL: CPT

## 2021-08-07 PROCEDURE — 2580000003 HC RX 258: Performed by: INTERNAL MEDICINE

## 2021-08-07 PROCEDURE — 83690 ASSAY OF LIPASE: CPT

## 2021-08-07 PROCEDURE — 80048 BASIC METABOLIC PNL TOTAL CA: CPT

## 2021-08-07 PROCEDURE — 2580000003 HC RX 258

## 2021-08-07 PROCEDURE — 82962 GLUCOSE BLOOD TEST: CPT

## 2021-08-07 PROCEDURE — 96365 THER/PROPH/DIAG IV INF INIT: CPT

## 2021-08-07 PROCEDURE — 99223 1ST HOSP IP/OBS HIGH 75: CPT | Performed by: INTERNAL MEDICINE

## 2021-08-07 PROCEDURE — 83605 ASSAY OF LACTIC ACID: CPT

## 2021-08-07 PROCEDURE — 82010 KETONE BODYS QUAN: CPT

## 2021-08-07 PROCEDURE — 02HV33Z INSERTION OF INFUSION DEVICE INTO SUPERIOR VENA CAVA, PERCUTANEOUS APPROACH: ICD-10-PCS | Performed by: INTERNAL MEDICINE

## 2021-08-07 RX ORDER — POLYETHYLENE GLYCOL 3350 17 G/17G
17 POWDER, FOR SOLUTION ORAL DAILY PRN
Status: DISCONTINUED | OUTPATIENT
Start: 2021-08-07 | End: 2021-08-11 | Stop reason: HOSPADM

## 2021-08-07 RX ORDER — 0.9 % SODIUM CHLORIDE 0.9 %
1000 INTRAVENOUS SOLUTION INTRAVENOUS ONCE
Status: DISCONTINUED | OUTPATIENT
Start: 2021-08-07 | End: 2021-08-07

## 2021-08-07 RX ORDER — POTASSIUM CHLORIDE 7.45 MG/ML
10 INJECTION INTRAVENOUS PRN
Status: DISCONTINUED | OUTPATIENT
Start: 2021-08-07 | End: 2021-08-08

## 2021-08-07 RX ORDER — DEXTROSE MONOHYDRATE 50 MG/ML
100 INJECTION, SOLUTION INTRAVENOUS PRN
Status: DISCONTINUED | OUTPATIENT
Start: 2021-08-07 | End: 2021-08-11 | Stop reason: HOSPADM

## 2021-08-07 RX ORDER — MAGNESIUM SULFATE 1 G/100ML
1000 INJECTION INTRAVENOUS PRN
Status: DISCONTINUED | OUTPATIENT
Start: 2021-08-07 | End: 2021-08-08

## 2021-08-07 RX ORDER — DEXTROSE, SODIUM CHLORIDE, AND POTASSIUM CHLORIDE 5; .45; .15 G/100ML; G/100ML; G/100ML
INJECTION INTRAVENOUS CONTINUOUS PRN
Status: DISCONTINUED | OUTPATIENT
Start: 2021-08-07 | End: 2021-08-08

## 2021-08-07 RX ORDER — SODIUM CHLORIDE AND POTASSIUM CHLORIDE .9; .15 G/100ML; G/100ML
SOLUTION INTRAVENOUS CONTINUOUS
Status: DISCONTINUED | OUTPATIENT
Start: 2021-08-07 | End: 2021-08-07

## 2021-08-07 RX ORDER — DEXTROSE MONOHYDRATE 25 G/50ML
12.5 INJECTION, SOLUTION INTRAVENOUS PRN
Status: DISCONTINUED | OUTPATIENT
Start: 2021-08-07 | End: 2021-08-11 | Stop reason: HOSPADM

## 2021-08-07 RX ORDER — NICOTINE POLACRILEX 4 MG
15 LOZENGE BUCCAL PRN
Status: DISCONTINUED | OUTPATIENT
Start: 2021-08-07 | End: 2021-08-11 | Stop reason: HOSPADM

## 2021-08-07 RX ORDER — 0.9 % SODIUM CHLORIDE 0.9 %
1000 INTRAVENOUS SOLUTION INTRAVENOUS ONCE
Status: COMPLETED | OUTPATIENT
Start: 2021-08-07 | End: 2021-08-07

## 2021-08-07 RX ORDER — SODIUM CHLORIDE 9 MG/ML
INJECTION, SOLUTION INTRAVENOUS CONTINUOUS
Status: DISCONTINUED | OUTPATIENT
Start: 2021-08-07 | End: 2021-08-09

## 2021-08-07 RX ORDER — HEPARIN SODIUM 5000 [USP'U]/ML
5000 INJECTION, SOLUTION INTRAVENOUS; SUBCUTANEOUS EVERY 8 HOURS SCHEDULED
Status: DISCONTINUED | OUTPATIENT
Start: 2021-08-07 | End: 2021-08-11 | Stop reason: HOSPADM

## 2021-08-07 RX ADMIN — SODIUM CHLORIDE 1000 ML: 9 INJECTION, SOLUTION INTRAVENOUS at 18:45

## 2021-08-07 RX ADMIN — POTASSIUM CHLORIDE AND SODIUM CHLORIDE: 900; 150 INJECTION, SOLUTION INTRAVENOUS at 21:06

## 2021-08-07 RX ADMIN — SODIUM CHLORIDE: 9 INJECTION, SOLUTION INTRAVENOUS at 23:23

## 2021-08-07 RX ADMIN — SODIUM CHLORIDE 7.03 UNITS/HR: 9 INJECTION, SOLUTION INTRAVENOUS at 20:29

## 2021-08-07 ASSESSMENT — PAIN DESCRIPTION - PAIN TYPE: TYPE: CHRONIC PAIN

## 2021-08-07 ASSESSMENT — ENCOUNTER SYMPTOMS
CHEST TIGHTNESS: 0
CONSTIPATION: 0
DIARRHEA: 0
SHORTNESS OF BREATH: 0
WHEEZING: 0
NAUSEA: 0
COUGH: 0
ABDOMINAL PAIN: 1
BLOOD IN STOOL: 0
VOMITING: 0

## 2021-08-07 ASSESSMENT — PAIN SCALES - GENERAL: PAINLEVEL_OUTOF10: 10

## 2021-08-07 NOTE — ED PROVIDER NOTES
[address] ENCOUNTER      Pt Name: Harika Dey  MRN: [id number]  [place] 1990  Date of evaluation: 8/7/2021      CHIEF COMPLAINT       Chief Complaint   Patient presents with    Hyperglycemia     abd pain wekness-chronic problems        HPI  Harika Dey is a 36 y.o. female with pmhx of ESRD on peritoneal dialysis( daily), DM1 and DKA, presents generalized abdominal pain, weakness, dysuria and hyperglycemia. Patient states that at 7 AM she measured her blood glucose and it was found to be above 500, she took insulin but symptoms did not improve. Patient is also complaining of dysuria for the past week. denies any fever, chills, hematuria, shortness of breath, chest pain, palpitations, leg swelling, nausea, vomiting, diarrhea, constipation. Except as noted above the remainder of the review of systems was reviewed and negative. Review of Systems   Constitutional: Positive for appetite change and fatigue. Negative for activity change, chills, diaphoresis and fever. HENT: Negative for congestion, nosebleeds and tinnitus. Eyes: Negative for visual disturbance. Respiratory: Negative for cough, chest tightness, shortness of breath and wheezing. Cardiovascular: Negative for chest pain, palpitations and leg swelling. Gastrointestinal: Positive for abdominal pain. Negative for blood in stool, constipation, diarrhea, nausea and vomiting. Genitourinary: Positive for dysuria. Negative for flank pain, hematuria, vaginal bleeding, vaginal discharge and vaginal pain. Skin: Negative for rash. Neurological: Positive for weakness. Negative for dizziness, syncope, numbness and headaches. Physical Exam  Constitutional:       General: She is not in acute distress. Appearance: She is ill-appearing. She is not toxic-appearing or diaphoretic. HENT:      Head: Normocephalic and atraumatic.       Right Ear: External ear normal.      Left Ear: External ear normal.      Nose: Nose normal. No congestion or rhinorrhea. Mouth/Throat:      Mouth: Mucous membranes are moist.      Pharynx: Oropharynx is clear. No oropharyngeal exudate or posterior oropharyngeal erythema. Eyes:      Conjunctiva/sclera: Conjunctivae normal.      Pupils: Pupils are equal, round, and reactive to light. Cardiovascular:      Rate and Rhythm: Normal rate and regular rhythm. Pulses: Normal pulses. Heart sounds: Normal heart sounds. Pulmonary:      Effort: Pulmonary effort is normal. No respiratory distress. Breath sounds: Normal breath sounds. Abdominal:      General: Abdomen is flat. Bowel sounds are normal. There is no distension. Palpations: Abdomen is soft. There is no mass. Tenderness: There is abdominal tenderness. There is no right CVA tenderness, left CVA tenderness or guarding. Hernia: No hernia is present. Comments: Generalized tenderness to deep palpation. Peritoneal HD port on the LLQ, well healed, non erythematous non rtender   Musculoskeletal:      Cervical back: Normal range of motion. Right lower leg: No edema. Left lower leg: No edema. Skin:     General: Skin is warm and dry. Capillary Refill: Capillary refill takes less than 2 seconds. Neurological:      Mental Status: She is oriented to person, place, and time. Motor: Weakness present. Procedures     MDM    36 y.o. female with pmhx of ESRD on peritoneal dialysis( daily), DM1 and DKA, presents generalized abdominal pain, weakness, dysuria and hyperglycemia. In the Ed,  Pt was very weak and hypotensive, BGS >500. Pt was a very tough stick, unable to obtain peripheral access. IJ line placed urgently, due to need of emergent resuscitation. Glucose 1188, anion gap. DKA protocol started. Pt's BP improved after fluids, potassium given.  Spoke to ICU() and hospitalist (Dr. GONCALVES), Pt will be admitted for further workup. ED Course as of Aug 07 2122   Sat Aug 07, 2021   Sokojoshramiro   Pt was a very hard stick unable to obtain peripheral access. IJ line placed urgently as pt is hypotensive, blood glucose >500. Need for emergent ressucitation. [TC]      ED Course User Index  [TC] Melody Woods MD       --------------------------------------------- PAST HISTORY ---------------------------------------------  Past Medical History:  has a past medical history of Acute congestive heart failure ([address] BC (acute kidney injury) ([address] Cardiac arrest ([address] Cephalgia, Chronic kidney disease, Depression, Diabetes mellitus ([address] Diabetic gastroparesis associated with type 1 diabetes mellitus ([address] Diabetic ketoacidosis ([address] Diabetic ketoacidosis with coma associated with type 1 diabetes mellitus ([address] Diabetic polyneuropathy associated with type 1 diabetes mellitus ([address] Drug use complicating pregnancy in third trimester, Endocarditis, ESRD (end stage renal disease) ([address] Hemodialysis patient ([address] History of blood transfusion, Hyperlipidemia, Hyperosmolar hyperglycemic state (HHS) ([address] Hypothyroidism, Iron deficiency anemia, MDRO (multiple drug resistant organisms) resistance, MRSA (methicillin resistant Staphylococcus aureus), Non compliance w medication regimen, Other disorders of kidney and ureter, Pregnancy, Previous  delivery affecting pregnancy, antepartum, Previous stillbirth or demise, antepartum, Seizure ([address] Severe pre-eclampsia in third trimester, Shock liver, and Valvular endocarditis. Past Surgical History:  has a past surgical history that includes ECHO Compl W Dop Color Flow (2012); ECHO Compl W Dop Color Flow (6/10/2013);  section; Tunneled venous port placement (2018); pr esophagogastroduodenoscopy transoral diagnostic (N/A, 2018); back surgery; Colonoscopy (N/A, 2018); Colonoscopy (N/A, 2018); Upper gastrointestinal endoscopy (2018);  Upper gastrointestinal endoscopy (N/A, 10/11/2019); Cholecystectomy, laparoscopic (N/A, 11/7/2019); LAPAROSCOPY INSERTION PERITONEAL CATHETER (N/A, 6/26/2020); transesophageal echocardiogram (N/A, 10/19/2020); embolectomy (N/A, 11/6/2020); Foot Debridement (Left, 2/23/2021); Foot Debridement (Left, 5/18/2021); and Upper gastrointestinal endoscopy (N/A, 7/14/2021). Social History:  reports that she quit smoking about 6 months ago. Her smoking use included cigarettes. She has a 3.00 pack-year smoking history. She has never used smokeless tobacco. She reports that she does not drink alcohol and does not use drugs. Family History: family history includes Asthma in her brother and mother; Diabetes in her mother; High Blood Pressure in her father and mother; Hypertension in her mother. The patients home medications have been reviewed.     Allergies: Cefepime and Toradol [ketorolac tromethamine]    -------------------------------------------------- RESULTS -------------------------------------------------    LABS:  Results for orders placed or performed during the hospital encounter of 08/07/21   CBC Auto Differential   Result Value Ref Range    WBC 14.9 (H) 4.5 - 11.5 E9/L    RBC 2.62 (L) 3.50 - 5.50 E12/L    Hemoglobin 7.9 (L) 11.5 - 15.5 g/dL    Hematocrit 29.8 (L) 34.0 - 48.0 %    .7 (H) 80.0 - 99.9 fL    MCH 30.2 26.0 - 35.0 pg    MCHC 26.5 (L) 32.0 - 34.5 %    RDW 16.6 (H) 11.5 - 15.0 fL    Platelets 507 111 - 245 E9/L    MPV 11.3 7.0 - 12.0 fL    Neutrophils % 91.1 (H) 43.0 - 80.0 %    Immature Granulocytes % 0.7 0.0 - 5.0 %    Lymphocytes % 4.0 (L) 20.0 - 42.0 %    Monocytes % 3.4 2.0 - 12.0 %    Eosinophils % 0.0 0.0 - 6.0 %    Basophils % 0.8 0.0 - 2.0 %    Neutrophils Absolute 13.61 (H) 1.80 - 7.30 E9/L    Immature Granulocytes # 0.10 E9/L    Lymphocytes Absolute 0.59 (L) 1.50 - 4.00 E9/L    Monocytes Absolute 0.50 0.10 - 0.95 E9/L    Eosinophils Absolute 0.00 (L) 0.05 - 0.50 E9/L    Basophils Absolute 0.12 0.00 - 0.20 E9/L    Anisocytosis 1+     Polychromasia 2+     Hypochromia 1+     Poikilocytes 1+     John Cells 1+     Target Cells 1+    Comprehensive Metabolic Panel w/ Reflex to MG   Result Value Ref Range    Sodium 124 (L) 132 - 146 mmol/L    Potassium reflex Magnesium 4.9 3.5 - 5.0 mmol/L    Chloride 77 (L) 98 - 107 mmol/L    CO2 13 (L) 22 - 29 mmol/L    Anion Gap 34 (H) 7 - 16 mmol/L    Glucose 1,188 (HH) 74 - 99 mg/dL    BUN 57 (H) 6 - 20 mg/dL    CREATININE 8.9 (HH) 0.5 - 1.0 mg/dL    GFR Non-African American 6 >=60 mL/min/1.73    GFR African American 6     Calcium 7.5 (L) 8.6 - 10.2 mg/dL    Total Protein 5.3 (L) 6.4 - 8.3 g/dL    Albumin 2.4 (L) 3.5 - 5.2 g/dL    Total Bilirubin <0.2 0.0 - 1.2 mg/dL    Alkaline Phosphatase 230 (H) 35 - 104 U/L    ALT 23 0 - 32 U/L    AST 13 0 - 31 U/L   Lipase   Result Value Ref Range    Lipase 11 (L) 13 - 60 U/L   Lactic Acid, Plasma   Result Value Ref Range    Lactic Acid 3.6 (HH) 0.5 - 2.2 mmol/L   Phosphorus   Result Value Ref Range    Phosphorus 8.7 (H) 2.5 - 4.5 mg/dL   Beta-Hydroxybutyrate   Result Value Ref Range    Beta-Hydroxybutyrate >4.50 (H) 0.02 - 0.27 mmol/L   pH, venous   Result Value Ref Range    pH, Khurram 7.20 (L) 7.35 - 6.89   Basic metabolic panel   Result Value Ref Range    Sodium 126 (L) 132 - 146 mmol/L    Potassium 4.8 3.5 - 5.0 mmol/L    Chloride 82 (L) 98 - 107 mmol/L    CO2 13 (L) 22 - 29 mmol/L    Anion Gap 31 (H) 7 - 16 mmol/L    Glucose 1,184 (HH) 74 - 99 mg/dL    BUN 56 (H) 6 - 20 mg/dL    CREATININE 8.7 (HH) 0.5 - 1.0 mg/dL    GFR Non-African American 7 >=60 mL/min/1.73    GFR African American 7     Calcium 6.9 (L) 8.6 - 10.2 mg/dL   POCT Glucose   Result Value Ref Range    Glucose  mg/dL    QC OK?  yes    POCT Glucose   Result Value Ref Range    Meter Glucose >500 (H) 74 - 99 mg/dL   POCT Glucose   Result Value Ref Range    Meter Glucose >500 (H) 74 - 99 mg/dL       RADIOLOGY:  No orders to display       ------------------------- NURSING NOTES AND VITALS REVIEWED ---------------------------  Date / Time Roomed:  8/7/2021  4:43 PM  ED Bed Assignment:  08/08    The nursing notes within the ED encounter and vital signs as below have been reviewed. Patient Vitals for the past 24 hrs:   BP Temp Pulse Resp SpO2 Height Weight   08/07/21 2020 (!) 93/44  101 18 98 %     08/07/21 1644 (!) 72/34 98.1 °F (36.7 °C) 97 20 97 %     08/07/21 1643      5' 4\" (1.626 m) 155 lb (70.3 kg)       Oxygen Saturation Interpretation: Normal    ------------------------------------------ PROGRESS NOTES ------------------------------------------  Re-evaluation(s):  Time: 0830  Patients symptoms show no change  Repeat physical examination is improved    Counseling:  I have spoken with the patient and discussed todays results, in addition to providing specific details for the plan of care and counseling regarding the diagnosis and prognosis. Their questions are answered at this time and they are agreeable with the plan of admission.    --------------------------------- ADDITIONAL PROVIDER NOTES ---------------------------------  Consultations:  Time: 900. Spoke with Dr. Giancarlo Avila and Dr. Queen Oneil spoke to Dr. Rylee Mckeon. Discussed case. They will admit the patient. This patient's ED course included: a personal history and physicial examination, re-evaluation prior to disposition, multiple bedside re-evaluations, IV medications, cardiac monitoring, continuous pulse oximetry and complex medical decision making and emergency management    This patient has remained hemodynamically stable during their ED course. Diagnosis:  1. Diabetic ketoacidosis without coma associated with type 1 diabetes mellitus ([address]        Disposition:  Patient's disposition: Admit to ICU  Patient's condition is stable.            Margot Dimas MD  Resident  08/07/21 [id number]

## 2021-08-08 ENCOUNTER — APPOINTMENT (OUTPATIENT)
Dept: GENERAL RADIOLOGY | Age: 31
DRG: 420 | End: 2021-08-08
Payer: COMMERCIAL

## 2021-08-08 LAB
ABO/RH: NORMAL
ANION GAP SERPL CALCULATED.3IONS-SCNC: 11 MMOL/L (ref 7–16)
ANION GAP SERPL CALCULATED.3IONS-SCNC: 11 MMOL/L (ref 7–16)
ANION GAP SERPL CALCULATED.3IONS-SCNC: 13 MMOL/L (ref 7–16)
ANION GAP SERPL CALCULATED.3IONS-SCNC: 23 MMOL/L (ref 7–16)
ANTIBODY SCREEN: NORMAL
BLOOD BANK DISPENSE STATUS: NORMAL
BLOOD BANK PRODUCT CODE: NORMAL
BPU ID: NORMAL
BUN BLDV-MCNC: 49 MG/DL (ref 6–20)
BUN BLDV-MCNC: 49 MG/DL (ref 6–20)
BUN BLDV-MCNC: 53 MG/DL (ref 6–20)
BUN BLDV-MCNC: 54 MG/DL (ref 6–20)
CALCIUM IONIZED: 1.02 MMOL/L (ref 1.15–1.33)
CALCIUM SERPL-MCNC: 6.4 MG/DL (ref 8.6–10.2)
CALCIUM SERPL-MCNC: 6.7 MG/DL (ref 8.6–10.2)
CALCIUM SERPL-MCNC: 6.7 MG/DL (ref 8.6–10.2)
CALCIUM SERPL-MCNC: 6.8 MG/DL (ref 8.6–10.2)
CHLORIDE BLD-SCNC: 100 MMOL/L (ref 98–107)
CHLORIDE BLD-SCNC: 101 MMOL/L (ref 98–107)
CHLORIDE BLD-SCNC: 89 MMOL/L (ref 98–107)
CHLORIDE BLD-SCNC: 99 MMOL/L (ref 98–107)
CO2: 15 MMOL/L (ref 22–29)
CO2: 22 MMOL/L (ref 22–29)
CO2: 23 MMOL/L (ref 22–29)
CO2: 23 MMOL/L (ref 22–29)
CREAT SERPL-MCNC: 8.2 MG/DL (ref 0.5–1)
CREAT SERPL-MCNC: 8.2 MG/DL (ref 0.5–1)
CREAT SERPL-MCNC: 8.3 MG/DL (ref 0.5–1)
CREAT SERPL-MCNC: 8.7 MG/DL (ref 0.5–1)
DESCRIPTION BLOOD BANK: NORMAL
FERRITIN: 182 NG/ML
FOLATE: 9.3 NG/ML (ref 4.8–24.2)
GFR AFRICAN AMERICAN: 7
GFR NON-AFRICAN AMERICAN: 7 ML/MIN/1.73
GLUCOSE BLD-MCNC: 109 MG/DL (ref 74–99)
GLUCOSE BLD-MCNC: 385 MG/DL (ref 74–99)
GLUCOSE BLD-MCNC: 606 MG/DL (ref 74–99)
GLUCOSE BLD-MCNC: 860 MG/DL (ref 74–99)
GLUCOSE BLD-MCNC: 93 MG/DL (ref 74–99)
HCT VFR BLD CALC: 19.7 % (ref 34–48)
HCT VFR BLD CALC: 24.4 % (ref 34–48)
HEMOGLOBIN: 6.5 G/DL (ref 11.5–15.5)
HEMOGLOBIN: 8.3 G/DL (ref 11.5–15.5)
IRON SATURATION: 66 % (ref 15–50)
IRON: 67 MCG/DL (ref 37–145)
MAGNESIUM: 1.6 MG/DL (ref 1.6–2.6)
MAGNESIUM: 1.7 MG/DL (ref 1.6–2.6)
MCH RBC QN AUTO: 31.1 PG (ref 26–35)
MCHC RBC AUTO-ENTMCNC: 33 % (ref 32–34.5)
MCV RBC AUTO: 94.3 FL (ref 80–99.9)
METER GLUCOSE: 102 MG/DL (ref 74–99)
METER GLUCOSE: 149 MG/DL (ref 74–99)
METER GLUCOSE: 162 MG/DL (ref 74–99)
METER GLUCOSE: 165 MG/DL (ref 74–99)
METER GLUCOSE: 173 MG/DL (ref 74–99)
METER GLUCOSE: 78 MG/DL (ref 74–99)
METER GLUCOSE: 80 MG/DL (ref 74–99)
METER GLUCOSE: 81 MG/DL (ref 74–99)
METER GLUCOSE: 87 MG/DL (ref 74–99)
METER GLUCOSE: >500 MG/DL (ref 74–99)
PDW BLD-RTO: 14.8 FL (ref 11.5–15)
PHOSPHORUS: 5 MG/DL (ref 2.5–4.5)
PHOSPHORUS: 5.1 MG/DL (ref 2.5–4.5)
PHOSPHORUS: 5.4 MG/DL (ref 2.5–4.5)
PHOSPHORUS: 6.2 MG/DL (ref 2.5–4.5)
PLATELET # BLD: 266 E9/L (ref 130–450)
PMV BLD AUTO: 10.5 FL (ref 7–12)
POTASSIUM SERPL-SCNC: 3.2 MMOL/L (ref 3.5–5)
POTASSIUM SERPL-SCNC: 3.3 MMOL/L (ref 3.5–5)
POTASSIUM SERPL-SCNC: 3.5 MMOL/L (ref 3.5–5)
POTASSIUM SERPL-SCNC: 3.7 MMOL/L (ref 3.5–5)
RBC # BLD: 2.09 E12/L (ref 3.5–5.5)
SODIUM BLD-SCNC: 127 MMOL/L (ref 132–146)
SODIUM BLD-SCNC: 134 MMOL/L (ref 132–146)
SODIUM BLD-SCNC: 134 MMOL/L (ref 132–146)
SODIUM BLD-SCNC: 135 MMOL/L (ref 132–146)
TOTAL IRON BINDING CAPACITY: 102 MCG/DL (ref 250–450)
VITAMIN B-12: 1315 PG/ML (ref 211–946)
WBC # BLD: 12.2 E9/L (ref 4.5–11.5)

## 2021-08-08 PROCEDURE — 83735 ASSAY OF MAGNESIUM: CPT

## 2021-08-08 PROCEDURE — 82746 ASSAY OF FOLIC ACID SERUM: CPT

## 2021-08-08 PROCEDURE — 85018 HEMOGLOBIN: CPT

## 2021-08-08 PROCEDURE — 6370000000 HC RX 637 (ALT 250 FOR IP)

## 2021-08-08 PROCEDURE — 36430 TRANSFUSION BLD/BLD COMPNT: CPT

## 2021-08-08 PROCEDURE — 36556 INSERT NON-TUNNEL CV CATH: CPT

## 2021-08-08 PROCEDURE — P9016 RBC LEUKOCYTES REDUCED: HCPCS

## 2021-08-08 PROCEDURE — 1200000000 HC SEMI PRIVATE

## 2021-08-08 PROCEDURE — 86901 BLOOD TYPING SEROLOGIC RH(D): CPT

## 2021-08-08 PROCEDURE — 85027 COMPLETE CBC AUTOMATED: CPT

## 2021-08-08 PROCEDURE — C9113 INJ PANTOPRAZOLE SODIUM, VIA: HCPCS | Performed by: NURSE PRACTITIONER

## 2021-08-08 PROCEDURE — 36592 COLLECT BLOOD FROM PICC: CPT

## 2021-08-08 PROCEDURE — 84100 ASSAY OF PHOSPHORUS: CPT

## 2021-08-08 PROCEDURE — 6360000002 HC RX W HCPCS: Performed by: INTERNAL MEDICINE

## 2021-08-08 PROCEDURE — 6360000002 HC RX W HCPCS: Performed by: NURSE PRACTITIONER

## 2021-08-08 PROCEDURE — 2580000003 HC RX 258: Performed by: NURSE PRACTITIONER

## 2021-08-08 PROCEDURE — 83550 IRON BINDING TEST: CPT

## 2021-08-08 PROCEDURE — 82330 ASSAY OF CALCIUM: CPT

## 2021-08-08 PROCEDURE — 2580000003 HC RX 258: Performed by: INTERNAL MEDICINE

## 2021-08-08 PROCEDURE — 86923 COMPATIBILITY TEST ELECTRIC: CPT

## 2021-08-08 PROCEDURE — 80048 BASIC METABOLIC PNL TOTAL CA: CPT

## 2021-08-08 PROCEDURE — 82728 ASSAY OF FERRITIN: CPT

## 2021-08-08 PROCEDURE — 82607 VITAMIN B-12: CPT

## 2021-08-08 PROCEDURE — 86900 BLOOD TYPING SEROLOGIC ABO: CPT

## 2021-08-08 PROCEDURE — 83540 ASSAY OF IRON: CPT

## 2021-08-08 PROCEDURE — 86850 RBC ANTIBODY SCREEN: CPT

## 2021-08-08 PROCEDURE — 36415 COLL VENOUS BLD VENIPUNCTURE: CPT

## 2021-08-08 PROCEDURE — 99232 SBSQ HOSP IP/OBS MODERATE 35: CPT | Performed by: INTERNAL MEDICINE

## 2021-08-08 PROCEDURE — 82947 ASSAY GLUCOSE BLOOD QUANT: CPT

## 2021-08-08 PROCEDURE — 71045 X-RAY EXAM CHEST 1 VIEW: CPT

## 2021-08-08 PROCEDURE — 2500000003 HC RX 250 WO HCPCS: Performed by: INTERNAL MEDICINE

## 2021-08-08 PROCEDURE — 85014 HEMATOCRIT: CPT

## 2021-08-08 PROCEDURE — 90945 DIALYSIS ONE EVALUATION: CPT

## 2021-08-08 PROCEDURE — 6370000000 HC RX 637 (ALT 250 FOR IP): Performed by: INTERNAL MEDICINE

## 2021-08-08 PROCEDURE — 82962 GLUCOSE BLOOD TEST: CPT

## 2021-08-08 PROCEDURE — 2580000003 HC RX 258

## 2021-08-08 RX ORDER — SODIUM CHLORIDE 9 MG/ML
INJECTION, SOLUTION INTRAVENOUS PRN
Status: DISCONTINUED | OUTPATIENT
Start: 2021-08-08 | End: 2021-08-11 | Stop reason: HOSPADM

## 2021-08-08 RX ORDER — PANTOPRAZOLE SODIUM 40 MG/1
40 TABLET, DELAYED RELEASE ORAL
Status: DISCONTINUED | OUTPATIENT
Start: 2021-08-08 | End: 2021-08-08

## 2021-08-08 RX ORDER — MIRTAZAPINE 15 MG/1
15 TABLET, FILM COATED ORAL NIGHTLY
Status: DISCONTINUED | OUTPATIENT
Start: 2021-08-08 | End: 2021-08-11 | Stop reason: HOSPADM

## 2021-08-08 RX ORDER — 0.9 % SODIUM CHLORIDE 0.9 %
1000 INTRAVENOUS SOLUTION INTRAVENOUS ONCE
Status: COMPLETED | OUTPATIENT
Start: 2021-08-08 | End: 2021-08-08

## 2021-08-08 RX ORDER — ROPINIROLE 0.25 MG/1
0.25 TABLET, FILM COATED ORAL NIGHTLY
Status: DISCONTINUED | OUTPATIENT
Start: 2021-08-08 | End: 2021-08-11 | Stop reason: HOSPADM

## 2021-08-08 RX ORDER — DEXTROSE MONOHYDRATE 100 MG/ML
INJECTION, SOLUTION INTRAVENOUS CONTINUOUS
Status: DISCONTINUED | OUTPATIENT
Start: 2021-08-08 | End: 2021-08-08

## 2021-08-08 RX ORDER — SODIUM CHLORIDE 9 MG/ML
10 INJECTION INTRAVENOUS 2 TIMES DAILY
Status: DISCONTINUED | OUTPATIENT
Start: 2021-08-08 | End: 2021-08-11 | Stop reason: HOSPADM

## 2021-08-08 RX ORDER — ARIPIPRAZOLE 5 MG/1
5 TABLET ORAL NIGHTLY
Status: DISCONTINUED | OUTPATIENT
Start: 2021-08-08 | End: 2021-08-11 | Stop reason: HOSPADM

## 2021-08-08 RX ORDER — INSULIN GLARGINE 100 [IU]/ML
6 INJECTION, SOLUTION SUBCUTANEOUS DAILY
Status: DISCONTINUED | OUTPATIENT
Start: 2021-08-08 | End: 2021-08-09

## 2021-08-08 RX ORDER — ALBUTEROL SULFATE 2.5 MG/3ML
2.5 SOLUTION RESPIRATORY (INHALATION) EVERY 6 HOURS PRN
Status: DISCONTINUED | OUTPATIENT
Start: 2021-08-08 | End: 2021-08-11 | Stop reason: HOSPADM

## 2021-08-08 RX ORDER — SEVELAMER CARBONATE 800 MG/1
800 TABLET, FILM COATED ORAL
Status: DISCONTINUED | OUTPATIENT
Start: 2021-08-08 | End: 2021-08-11 | Stop reason: HOSPADM

## 2021-08-08 RX ORDER — LEVOTHYROXINE SODIUM 0.07 MG/1
74 TABLET ORAL DAILY
Status: DISCONTINUED | OUTPATIENT
Start: 2021-08-08 | End: 2021-08-11 | Stop reason: HOSPADM

## 2021-08-08 RX ORDER — PANTOPRAZOLE SODIUM 40 MG/10ML
40 INJECTION, POWDER, LYOPHILIZED, FOR SOLUTION INTRAVENOUS 2 TIMES DAILY
Status: DISCONTINUED | OUTPATIENT
Start: 2021-08-08 | End: 2021-08-11 | Stop reason: HOSPADM

## 2021-08-08 RX ORDER — HYDROMORPHONE HYDROCHLORIDE 1 MG/ML
0.5 INJECTION, SOLUTION INTRAMUSCULAR; INTRAVENOUS; SUBCUTANEOUS EVERY 4 HOURS PRN
Status: DISCONTINUED | OUTPATIENT
Start: 2021-08-08 | End: 2021-08-11 | Stop reason: HOSPADM

## 2021-08-08 RX ORDER — FOLIC ACID 1 MG/1
1 TABLET ORAL DAILY
Status: DISCONTINUED | OUTPATIENT
Start: 2021-08-08 | End: 2021-08-11 | Stop reason: HOSPADM

## 2021-08-08 RX ORDER — METOCLOPRAMIDE 5 MG/1
5 TABLET ORAL 3 TIMES DAILY
Status: DISCONTINUED | OUTPATIENT
Start: 2021-08-08 | End: 2021-08-11 | Stop reason: HOSPADM

## 2021-08-08 RX ORDER — PREGABALIN 25 MG/1
25 CAPSULE ORAL DAILY
Status: DISCONTINUED | OUTPATIENT
Start: 2021-08-08 | End: 2021-08-11 | Stop reason: HOSPADM

## 2021-08-08 RX ADMIN — ROPINIROLE HYDROCHLORIDE 0.25 MG: 0.25 TABLET, FILM COATED ORAL at 21:03

## 2021-08-08 RX ADMIN — POTASSIUM CHLORIDE 10 MEQ: 7.46 INJECTION, SOLUTION INTRAVENOUS at 04:09

## 2021-08-08 RX ADMIN — HYDROMORPHONE HYDROCHLORIDE 0.5 MG: 1 INJECTION, SOLUTION INTRAMUSCULAR; INTRAVENOUS; SUBCUTANEOUS at 22:58

## 2021-08-08 RX ADMIN — MIRTAZAPINE 15 MG: 15 TABLET, FILM COATED ORAL at 21:02

## 2021-08-08 RX ADMIN — PANTOPRAZOLE SODIUM 40 MG: 40 INJECTION, POWDER, FOR SOLUTION INTRAVENOUS at 21:02

## 2021-08-08 RX ADMIN — METOCLOPRAMIDE 5 MG: 5 TABLET ORAL at 13:39

## 2021-08-08 RX ADMIN — POTASSIUM CHLORIDE 10 MEQ: 7.46 INJECTION, SOLUTION INTRAVENOUS at 05:06

## 2021-08-08 RX ADMIN — HEPARIN SODIUM 5000 UNITS: 5000 INJECTION INTRAVENOUS; SUBCUTANEOUS at 21:09

## 2021-08-08 RX ADMIN — ARIPIPRAZOLE 5 MG: 5 TABLET ORAL at 21:02

## 2021-08-08 RX ADMIN — METOCLOPRAMIDE 5 MG: 5 TABLET ORAL at 08:55

## 2021-08-08 RX ADMIN — FOLIC ACID 1 MG: 1 TABLET ORAL at 08:56

## 2021-08-08 RX ADMIN — INSULIN GLARGINE 6 UNITS: 100 INJECTION, SOLUTION SUBCUTANEOUS at 15:31

## 2021-08-08 RX ADMIN — PREGABALIN 25 MG: 25 CAPSULE ORAL at 08:56

## 2021-08-08 RX ADMIN — LEVOTHYROXINE SODIUM 75 MCG: 0.07 TABLET ORAL at 07:33

## 2021-08-08 RX ADMIN — SODIUM CHLORIDE 1000 ML: 9 INJECTION, SOLUTION INTRAVENOUS at 05:09

## 2021-08-08 RX ADMIN — SEVELAMER CARBONATE 800 MG: 800 TABLET, FILM COATED ORAL at 08:55

## 2021-08-08 RX ADMIN — INSULIN LISPRO 3 UNITS: 100 INJECTION, SOLUTION INTRAVENOUS; SUBCUTANEOUS at 17:58

## 2021-08-08 RX ADMIN — METOCLOPRAMIDE 5 MG: 5 TABLET ORAL at 21:02

## 2021-08-08 RX ADMIN — DEXTROSE MONOHYDRATE: 100 INJECTION, SOLUTION INTRAVENOUS at 13:44

## 2021-08-08 RX ADMIN — PANTOPRAZOLE SODIUM 40 MG: 40 INJECTION, POWDER, FOR SOLUTION INTRAVENOUS at 08:56

## 2021-08-08 RX ADMIN — INSULIN LISPRO 1 UNITS: 100 INJECTION, SOLUTION INTRAVENOUS; SUBCUTANEOUS at 18:00

## 2021-08-08 RX ADMIN — HEPARIN SODIUM 5000 UNITS: 5000 INJECTION INTRAVENOUS; SUBCUTANEOUS at 13:39

## 2021-08-08 RX ADMIN — HEPARIN SODIUM 5000 UNITS: 5000 INJECTION INTRAVENOUS; SUBCUTANEOUS at 07:36

## 2021-08-08 RX ADMIN — SODIUM CHLORIDE: 9 INJECTION, SOLUTION INTRAVENOUS at 06:17

## 2021-08-08 RX ADMIN — SODIUM CHLORIDE, PRESERVATIVE FREE 10 ML: 5 INJECTION INTRAVENOUS at 08:56

## 2021-08-08 RX ADMIN — SODIUM CHLORIDE 0.49 UNITS/HR: 9 INJECTION, SOLUTION INTRAVENOUS at 14:00

## 2021-08-08 RX ADMIN — SODIUM CHLORIDE, PRESERVATIVE FREE 10 ML: 5 INJECTION INTRAVENOUS at 21:03

## 2021-08-08 RX ADMIN — POTASSIUM CHLORIDE 10 MEQ: 7.46 INJECTION, SOLUTION INTRAVENOUS at 06:17

## 2021-08-08 RX ADMIN — HYDROMORPHONE HYDROCHLORIDE 0.5 MG: 1 INJECTION, SOLUTION INTRAMUSCULAR; INTRAVENOUS; SUBCUTANEOUS at 02:46

## 2021-08-08 RX ADMIN — POTASSIUM CHLORIDE, DEXTROSE MONOHYDRATE AND SODIUM CHLORIDE: 150; 5; 450 INJECTION, SOLUTION INTRAVENOUS at 08:25

## 2021-08-08 ASSESSMENT — PAIN DESCRIPTION - LOCATION
LOCATION: GENERALIZED
LOCATION: GENERALIZED

## 2021-08-08 ASSESSMENT — PAIN SCALES - GENERAL
PAINLEVEL_OUTOF10: 8
PAINLEVEL_OUTOF10: 0
PAINLEVEL_OUTOF10: 0
PAINLEVEL_OUTOF10: 7
PAINLEVEL_OUTOF10: 7
PAINLEVEL_OUTOF10: 5

## 2021-08-08 ASSESSMENT — PAIN DESCRIPTION - DESCRIPTORS: DESCRIPTORS: ACHING

## 2021-08-08 ASSESSMENT — PAIN DESCRIPTION - PAIN TYPE
TYPE: CHRONIC PAIN
TYPE: CHRONIC PAIN

## 2021-08-08 NOTE — H&P
[facility] History and Physical      CHIEF COMPLAINT: Abdominal pain    History of Present Illness: Patient is a 62-year-old female with past medical history significant for poorly controlled diabetes, multiple admissions for DKA, end-stage renal disease on peritoneal dialysis, cardiac arrest, CHF, gastroparesis, depression, hypothyroidism, hyperlipidemia, iron deficiency anemia. She presented to the emergency department with complaints of generalized abdominal pain as well as generalized weakness. Reports her symptoms have been worsening for the past day. She is also reporting elevated blood glucose. She states she checks her sugar this morning before breakfast and it was reading greater than 500. She attempted to improve this with her home dose of insulin, but her glucose again read greater than 500. She also complains of dysuria which has been present for the past several days. She has no fever, chills, sick contacts, recent travel, nausea, vomiting, diarrhea, melena, hematochezia. In the ED, she is noted to have a glucose of 1100, anion gap of 34, lactic acid of 3.6, white blood cell count of 14.9, beta hydroxybutyrate greater than 4.5, CO2 of 13, venous pH of 7.2. Medicine was asked to admit her for DKA. Intensivist was consulted, accepted patient to the ICU.     REVIEW OF SYSTEMS:  A comprehensive review of systems was negative except for: what is in the HPI  PMH:  Past Medical History:   Diagnosis Date    Acute congestive heart failure ([address]     BC (acute kidney injury) ([address] 10/1/2019    Cardiac arrest ([address] 2/15/2021    Cephalgia 10/9/2019    Chronic kidney disease     Depression     Diabetes mellitus ([address]     Diabetic gastroparesis associated with type 1 diabetes mellitus ([address] 12/17/2018    Diabetic ketoacidosis ([address] 8/27/2011    Diabetic ketoacidosis with coma associated with type 1 diabetes mellitus ([address] 6/26/2013    Diabetic polyneuropathy associated with type 1 diabetes mellitus ([facility] 2020    Drug use complicating pregnancy in third trimester     Endocarditis 10/31/2020    ESRD (end stage renal disease) ([facility] 2020    Hemodialysis patient ([facility]     History of blood transfusion 2019    Hyperlipidemia 10/8/2020    Hyperosmolar hyperglycemic state (HHS) ([facility] 2020    Hypothyroidism 10/8/2020    Iron deficiency anemia 10/1/2019    MDRO (multiple drug resistant organisms) resistance     MRSA (methicillin resistant Staphylococcus aureus)     back wound abcess    Non compliance w medication regimen 3/30/2016    Other disorders of kidney and ureter     Pregnancy 3/30/2016    16 weeks    Previous  delivery affecting pregnancy, antepartum 3/2/2017    Previous stillbirth or demise, antepartum 2016    Seizure ([facility] 2020    Severe pre-eclampsia in third trimester 2016    Shock liver 2/15/2021    Valvular endocarditis 11/10/2020    This Diagnosis was added to the Problem List based on transcribed orders from Dr. Irina Nazario          Surgical History:  Past Surgical History:   Procedure Laterality Date    BACK SURGERY      abscess     SECTION      x2    CHOLECYSTECTOMY, LAPAROSCOPIC N/A 2019    CHOLECYSTECTOMY LAPAROSCOPIC performed by Shelley Juarez MD at [facility] 2018    COLONOSCOPY WITH BIOPSY performed by Israel Carrasco MD at [place] [address] N/A 2018    COLONOSCOPY WITH BIOPSY performed by Kristine Marroquin MD at [address] [place] [address] ECHO COMPL W [address]  2012    EF 57%    ECHO COMPL W DOP COLOR FLOW  6/10/2013         EMBOLECTOMY N/A 2020    ANGIOVAC, VEGECTOMY, YULISSA -- REQS ROOM 3 performed by Jeimy Hernandez MD at [address] [place] [address] Left 2021    LEFT LEG INCISION AND DRAINAGE, DEBRIDEMENT, WOUND VAC APPLICATION performed by Meliton Reed DPM at [address] [place] [address] Left 2021    LEFT LEG DEBRIDEMENT BIOPSY POSS APPLICATION WOUND VAC performed by Roc Kim DPM at [address] N/A 6/26/2020    LAPAROSCOPIC INSERTION PERITONEAL DIALYSIS CATHETER performed by Nia Matias MD at [facility] ESOPHAGOGASTRODUODENOSCOPY TRANSORAL DIAGNOSTIC N/A 5/30/2018    EGD ESOPHAGOGASTRODUODENOSCOPY performed by James Garza MD at [address] [facility] TRANSESOPHAGEAL ECHOCARDIOGRAM N/A 10/19/2020    TRANSESOPHAGEAL ECHOCARDIOGRAM WITH BUBBLE STUDY performed by Jase Torres MD at [address] [facility] TUNNELED VENOUS PORT PLACEMENT  04/2018    UPPER GASTROINTESTINAL ENDOSCOPY  12/18/2018    EGD BIOPSY performed by Lakshmi Da Silva MD at [address] [facility] 10/11/2019    EGD ESOPHAGOGASTRODUODENOSCOPY performed by Yoni Hurd DO at [address] [facility] N/A 7/14/2021    EGD BIOPSY performed by Naeem Arreguin MD at [place] [address]       Medications Prior to Admission:    Prior to Admission medications    Medication Sig Start Date End Date Taking? Authorizing Provider   hydrALAZINE (APRESOLINE) 50 MG tablet Take 1 tablet by mouth every 8 hours New higher dose 7/15/21   Anushka Fried MD   carvedilol (COREG) 6.25 MG tablet Take 1 tablet by mouth 2 times daily (with meals) Replaced Lopressor 7/15/21   Anushka Fried MD   pregabalin (LYRICA) 25 MG capsule Take 1 capsule by mouth daily for 30 days. 7/16/21 8/15/21  Anushka Fried MD   insulin glargine (LANTUS) 100 UNIT/ML injection vial Inject 7 Units into the skin daily New change in dose and frequency 7/3/21   Anushka Fried MD   sevelamer (RENVELA) 800 MG tablet Take 1 tablet by mouth 3 times daily (with meals) New lower dose 7/2/21   Anushka Fried MD   insulin lispro, 1 Unit Dial, (HUMALOG KWIKPEN) 100 UNIT/ML SOPN Inject 3 units with meals + sliding scale.  MAX 30U/day 6/1/21   Zeke Benavides MD   Insulin Pen Needle (BD PEN NEEDLE NAV U/F) 32G X 4 MM MISC Uses with insulin 4 times a day 6/1/21   Maddie Tom MD   blood glucose monitor strips Freestyle Lite Strips. Checks 4 times/day before meals and at bedtime and as needed for symptoms of irregular blood glucose. 6/1/21   Zeke Swann MD   mupirocin (BACTROBAN) 2 % ointment Apply 15 g topically daily Apply topically daily to left leg. Historical Provider, MD   folic acid (FOLVITE) 1 MG tablet Take 1 tablet by mouth daily 5/19/21 6/18/21  Rogelio Gallo MD   mirtazapine (REMERON) 15 MG tablet Take 15 mg by mouth nightly    Historical Provider, MD   hydrOXYzine (ATARAX) 25 MG tablet Take 25 mg by mouth daily    Historical Provider, MD   polyethylene glycol (GLYCOLAX) 17 g packet Take 17 g by mouth daily as needed for Constipation    Historical Provider, MD   albuterol (PROVENTIL) (2.5 MG/3ML) 0.083% nebulizer solution Take 2.5 mg by nebulization every 6 hours as needed for Wheezing    Historical Provider, MD   ARIPiprazole (ABILIFY) 5 MG tablet Take 5 mg by mouth nightly 4/18/21   Historical Provider, MD   Glucagon rDNA, (GLUCAGON EMERGENCY IJ) Inject as directed    Historical Provider, MD   glucose (GLUTOSE) 40 % GEL Take 15 g by mouth See Admin Instructions    Historical Provider, MD   pantoprazole (PROTONIX) 40 MG tablet Take 1 tablet by mouth every morning (before breakfast) 4/19/21   Jacinda Littlejohn, DO   levothyroxine (SYNTHROID) 75 MCG tablet TAKE 1 TABLET BY MOUTH IN THE MORNING BEFORE BREAKFAST 4/19/21   Jacinda Littlejohn, DO   epoetin nena-epbx (RETACRIT) 3000 UNIT/ML SOLN injection Inject 1 mL into the skin three times a week 3/1/21   Jovi Cooley MD   rOPINIRole (REQUIP) 0.25 MG tablet Take 0.25 mg by mouth nightly    Historical Provider, MD   blood glucose test strips (CONTOUR NEXT TEST) strip 1 each by In Vitro route 5 times daily As needed.  12/14/20   Zeke Swann MD   metoclopramide (REGLAN) 5 MG tablet Take 5 mg by mouth 3 times daily     Historical Provider, MD Allergies:    Cefepime and Toradol [ketorolac tromethamine]    Social History:    reports that she quit smoking about 6 months ago. Her smoking use included cigarettes. She has a 3.00 pack-year smoking history. She has never used smokeless tobacco. She reports that she does not drink alcohol and does not use drugs. Family History:   family history includes Asthma in her brother and mother; Diabetes in her mother; High Blood Pressure in her father and mother; Hypertension in her mother. PHYSICAL EXAM:  Vitals:  BP (!) 93/44   Pulse 103   Temp 98.1 °F (36.7 °C)   Resp 14   Ht 5' 4\" (1.626 m)   Wt 155 lb (70.3 kg)   SpO2 96%   BMI 26.61 kg/m²     General Appearance: alert and oriented to person, place and time and in no acute distress  Skin: warm and dry  Head: normocephalic and atraumatic  Eyes: pupils equal, round, and reactive to light, extraocular eye movements intact, conjunctivae normal  Neck: neck supple and non tender without mass   Pulmonary/Chest: clear to auscultation bilaterally- no wheezes, rales or rhonchi, normal air movement, no respiratory distress  Cardiovascular: normal rate, normal S1 and S2 and no carotid bruits  Abdomen: soft, non-tender, non-distended, normal bowel sounds, no masses or organomegaly  Extremities: no cyanosis, no clubbing and no edema  Neurologic: no cranial nerve deficit and speech normal        LABS:  Recent Labs     08/07/21  1754 08/07/21 2020   * 126*   K 4.9 4.8   CL 77* 82*   CO2 13* 13*   BUN 57* 56*   CREATININE 8.9* 8.7*   GLUCOSE 1,188* 1,184*   CALCIUM 7.5* 6.9*       Recent Labs     08/07/21  1754   WBC 14.9*   RBC 2.62*   HGB 7.9*   HCT 29.8*   .7*   MCH 30.2   MCHC 26.5*   RDW 16.6*      MPV 11.3       No results for input(s): POCGLU in the last 72 hours.     CBC:   Lab Results   Component Value Date    WBC 14.9 08/07/2021    RBC 2.62 08/07/2021    HGB 7.9 08/07/2021    HCT 29.8 08/07/2021    .7 08/07/2021    MCH 30.2 08/07/2021    MCHC 26.5 08/07/2021    RDW 16.6 08/07/2021     08/07/2021    MPV 11.3 08/07/2021     CMP:    Lab Results   Component Value Date     08/07/2021    K 4.8 08/07/2021    K 4.9 08/07/2021    CL 82 08/07/2021    CO2 13 08/07/2021    BUN 56 08/07/2021    CREATININE 8.7 08/07/2021    GFRAA 7 08/07/2021    LABGLOM 7 08/07/2021    GLUCOSE 1,184 08/07/2021    GLUCOSE 130 05/18/2012    PROT 5.3 08/07/2021    LABALBU 2.4 08/07/2021    LABALBU 4.1 05/18/2012    CALCIUM 6.9 08/07/2021    BILITOT <0.2 08/07/2021    ALKPHOS 230 08/07/2021    AST 13 08/07/2021    ALT 23 08/07/2021       Radiology:   No orders to display       ASSESSMENT:      Active Problems:    DKA, type 1, not at goal [facility])  Resolved Problems:    * No resolved hospital problems. *      PLAN:    DKA  Poorly controlled diabetes  61-year-old female with history of poorly controlled diabetes and frequent admissions for DKA, presenting with abdominal pain, weakness, noted to be back in DKA with initial glucose of 1100, anion gap of 34, beta hydroxybutyrate greater than 4.5. Intensivist accepts her to the ICU for further treatment. Medicine was asked to admit.  -Admit to intensive care unit  -Intensivist consult  -Continue insulin protocol  -Monitor electrolytes  -Replace electrolytes as needed  -Transition to subcu insulin when appropriate    End-stage renal disease  On peritoneal dialysis at home. -Nephrology consult  -Monitor renal labs    Hypothyroidism  -Continue home dose of levothyroxine    Secondary hypertension  -Hold home dose of antihypertensives in the setting of relative hypotension in the ED  -Resume when appropriate    Code Status: Full  DVT prophylaxis: Heparin, SCDs      NOTE: This report was transcribed using voice recognition software. Every effort was made to ensure accuracy; however, inadvertent computerized transcription errors may be present.   Electronically signed by Estella Reece DO on 8/7/2021 at 9:41 PM

## 2021-08-08 NOTE — PROCEDURES
Bebeto Reece is a 36 y.o. female patient. 1. Diabetic ketoacidosis without coma associated with type 1 diabetes mellitus ([address]      Past Medical History:   Diagnosis Date    Acute congestive heart failure ([address]     BC (acute kidney injury) ([address] 10/1/2019    Cardiac arrest ([address] 2/15/2021    Cephalgia 10/9/2019    Chronic kidney disease     Depression     Diabetes mellitus ([address]     Diabetic gastroparesis associated with type 1 diabetes mellitus ([address] 2018    Diabetic ketoacidosis ([address] 2011    Diabetic ketoacidosis with coma associated with type 1 diabetes mellitus ([address] 2013    Diabetic polyneuropathy associated with type 1 diabetes mellitus ([address] 2020    Drug use complicating pregnancy in third trimester     Endocarditis 10/31/2020    ESRD (end stage renal disease) ([address] 2020    Hemodialysis patient ([address]     History of blood transfusion 2019    Hyperlipidemia 10/8/2020    Hyperosmolar hyperglycemic state (HHS) ([address] 2020    Hypothyroidism 10/8/2020    Iron deficiency anemia 10/1/2019    MDRO (multiple drug resistant organisms) resistance     MRSA (methicillin resistant Staphylococcus aureus)     back wound abcess    Non compliance w medication regimen 3/30/2016    Other disorders of kidney and ureter     Pregnancy 3/30/2016    16 weeks    Previous  delivery affecting pregnancy, antepartum 3/2/2017    Previous stillbirth or demise, antepartum 2016    Seizure ([address] 2020    Severe pre-eclampsia in third trimester 2016    Shock liver 2/15/2021    Valvular endocarditis 11/10/2020    This Diagnosis was added to the Problem List based on transcribed orders from Dr. Adams Mode        Blood pressure (!) 78/43, pulse 88, temperature 98 °F (36.7 °C), temperature source Oral, resp. rate (!) 7, height 5' 4\" (1.626 m), weight 155 lb (70.3 kg), SpO2 95 %, not currently breastfeeding.     Central Line    Date/Time: 2021 3:22 AM  Performed by: HEBER Escamilla CNP  Authorized by: HEBER Escamilla CNP   Consent given by: patient  Patient understanding: patient states understanding of the procedure being performed  Patient consent: the patient's understanding of the procedure matches consent given  Procedure consent: procedure consent matches procedure scheduled  Patient identity confirmed: arm band and verbally with patient  Time out: Immediately prior to procedure a \"time out\" was called to verify the correct patient, procedure, equipment, support staff and site/side marked as required.   Indications: vascular access  Anesthesia: local infiltration    Anesthesia:  Local Anesthetic: lidocaine 1% without epinephrine  Anesthetic total: 5 mL    Sedation:  Patient sedated: no    Preparation: skin prepped with ChloraPrep  Skin prep agent dried: skin prep agent completely dried prior to procedure  Sterile barriers: all five maximum sterile barriers used - cap, mask, sterile gown, sterile gloves, and large sterile sheet  Hand hygiene: hand hygiene performed prior to central venous catheter insertion  Location details: left internal jugular  Patient position: Trendelenburg  Catheter type: triple lumen  Ultrasound guidance: yes  Number of attempts: 1  Successful placement: yes  Post-procedure: line sutured and dressing applied  Assessment: blood return through all ports and placement verified by x-ray          HEBER Kingston CNP  8/8/2021

## 2021-08-08 NOTE — PLAN OF CARE
Problem: Pain:  Goal: Control of acute pain  Description: Control of acute pain  Outcome: Met This Shift     Problem: Skin Integrity:  Goal: Will show no infection signs and symptoms  Description: Will show no infection signs and symptoms  Outcome: Met This Shift     Problem: Skin Integrity:  Goal: Absence of new skin breakdown  Description: Absence of new skin breakdown  Outcome: Met This Shift

## 2021-08-08 NOTE — PROGRESS NOTES
magnesium sulfate, sodium phosphate IVPB **OR** sodium phosphate IVPB **OR** sodium phosphate IVPB, polyethylene glycol, dextrose 5% and 0.45% NaCl with KCl 20 mEq    OBJECTIVE:  Vitals:    08/08/21 0845   BP:    Pulse:    Resp:    Temp: 98 °F (36.7 °C)   SpO2:            O2 Device: None (Room air)        LABS:  WBC   Date Value Ref Range Status   08/08/2021 12.2 (H) 4.5 - 11.5 E9/L Final   08/07/2021 14.9 (H) 4.5 - 11.5 E9/L Final   07/14/2021 5.7 4.5 - 11.5 E9/L Final     Hemoglobin   Date Value Ref Range Status   08/08/2021 6.5 (LL) 11.5 - 15.5 g/dL Final   08/07/2021 7.9 (L) 11.5 - 15.5 g/dL Final   07/14/2021 8.0 (L) 11.5 - 15.5 g/dL Final     Hematocrit   Date Value Ref Range Status   08/08/2021 19.7 (L) 34.0 - 48.0 % Final   08/07/2021 29.8 (L) 34.0 - 48.0 % Final   07/14/2021 25.4 (L) 34.0 - 48.0 % Final     MCV   Date Value Ref Range Status   08/08/2021 94.3 80.0 - 99.9 fL Final   08/07/2021 113.7 (H) 80.0 - 99.9 fL Final   07/14/2021 97.7 80.0 - 99.9 fL Final     Platelets   Date Value Ref Range Status   08/08/2021 266 130 - 450 E9/L Final   08/07/2021 322 130 - 450 E9/L Final   07/14/2021 149 130 - 450 E9/L Final     Sodium   Date Value Ref Range Status   08/08/2021 134 132 - 146 mmol/L Final   08/08/2021 127 (L) 132 - 146 mmol/L Final   08/07/2021 125 (L) 132 - 146 mmol/L Final     Potassium   Date Value Ref Range Status   08/08/2021 3.2 (L) 3.5 - 5.0 mmol/L Final   08/08/2021 3.5 3.5 - 5.0 mmol/L Final   08/07/2021 4.0 3.5 - 5.0 mmol/L Final     Potassium reflex Magnesium   Date Value Ref Range Status   08/07/2021 4.9 3.5 - 5.0 mmol/L Final   07/15/2021 3.8 3.5 - 5.0 mmol/L Final   07/14/2021 3.9 3.5 - 5.0 mmol/L Final     Chloride   Date Value Ref Range Status   08/08/2021 99 98 - 107 mmol/L Final   08/08/2021 89 (L) 98 - 107 mmol/L Final   08/07/2021 84 (L) 98 - 107 mmol/L Final     CO2   Date Value Ref Range Status   08/08/2021 22 22 - 29 mmol/L Final   08/08/2021 15 (L) 22 - 29 mmol/L Final 08/07/2021 11 (L) 22 - 29 mmol/L Final     BUN   Date Value Ref Range Status   08/08/2021 54 (H) 6 - 20 mg/dL Final   08/08/2021 53 (H) 6 - 20 mg/dL Final   08/07/2021 53 (H) 6 - 20 mg/dL Final     CREATININE   Date Value Ref Range Status   08/08/2021 8.3 (HH) 0.5 - 1.0 mg/dL Final   08/08/2021 8.7 (HH) 0.5 - 1.0 mg/dL Final   08/07/2021 8.6 (HH) 0.5 - 1.0 mg/dL Final     Glucose   Date Value Ref Range Status   08/08/2021 385 (H) 74 - 99 mg/dL Final   08/08/2021 606 (HH) 74 - 99 mg/dL Final   08/08/2021 860 (HH) 74 - 99 mg/dL Final   05/18/2012 130 (H) 70 - 110 mg/dL Final   04/26/2012 61 (L) 70 - 110 mg/dL Final   04/25/2012 196 (H) 70 - 110 mg/dL Final     Calcium   Date Value Ref Range Status   08/08/2021 6.7 (L) 8.6 - 10.2 mg/dL Final   08/08/2021 6.8 (L) 8.6 - 10.2 mg/dL Final   08/07/2021 6.6 (L) 8.6 - 10.2 mg/dL Final     Total Protein   Date Value Ref Range Status   08/07/2021 5.3 (L) 6.4 - 8.3 g/dL Final   07/09/2021 6.1 (L) 6.4 - 8.3 g/dL Final   07/02/2021 5.8 (L) 6.4 - 8.3 g/dL Final     Albumin   Date Value Ref Range Status   08/07/2021 2.4 (L) 3.5 - 5.2 g/dL Final   07/09/2021 3.4 (L) 3.5 - 5.2 g/dL Final   07/02/2021 3.3 (L) 3.5 - 5.2 g/dL Final   05/18/2012 4.1 3.2 - 4.8 g/dL Final   04/23/2012 4.0 3.2 - 4.8 g/dL Final   04/02/2012 4.8 3.2 - 4.8 g/dL Final     Total Bilirubin   Date Value Ref Range Status   08/07/2021 <0.2 0.0 - 1.2 mg/dL Final   07/09/2021 0.2 0.0 - 1.2 mg/dL Final   07/02/2021 <0.2 0.0 - 1.2 mg/dL Final     Alkaline Phosphatase   Date Value Ref Range Status   08/07/2021 230 (H) 35 - 104 U/L Final   07/09/2021 190 (H) 35 - 104 U/L Final   07/02/2021 142 (H) 35 - 104 U/L Final     AST   Date Value Ref Range Status   08/07/2021 13 0 - 31 U/L Final   07/09/2021 30 0 - 31 U/L Final     Comment:     Specimen is slightly Hemolyzed. Result may be artificially increased. 07/02/2021 53 (H) 0 - 31 U/L Final     Comment:     Specimen is moderately Hemolyzed.  Result may be artificially increased. ALT   Date Value Ref Range Status   08/07/2021 23 0 - 32 U/L Final   07/09/2021 22 0 - 32 U/L Final   07/02/2021 50 (H) 0 - 32 U/L Final     GFR Non-   Date Value Ref Range Status   08/08/2021 7 >=60 mL/min/1.73 Final     Comment:     Chronic Kidney Disease: less than 60 ml/min/1.73 sq.m. Kidney Failure: less than 15 ml/min/1.73 sq.m. Results valid for patients 18 years and older. 08/08/2021 7 >=60 mL/min/1.73 Final     Comment:     Chronic Kidney Disease: less than 60 ml/min/1.73 sq.m. Kidney Failure: less than 15 ml/min/1.73 sq.m. Results valid for patients 18 years and older. 08/07/2021 7 >=60 mL/min/1.73 Final     Comment:     Chronic Kidney Disease: less than 60 ml/min/1.73 sq.m. Kidney Failure: less than 15 ml/min/1.73 sq.m. Results valid for patients 18 years and older. GFR    Date Value Ref Range Status   08/08/2021 7  Final   08/08/2021 7  Final   08/07/2021 7  Final     Magnesium   Date Value Ref Range Status   08/08/2021 1.6 1.6 - 2.6 mg/dL Final   08/08/2021 1.7 1.6 - 2.6 mg/dL Final   07/12/2021 1.8 1.6 - 2.6 mg/dL Final     Phosphorus   Date Value Ref Range Status   08/08/2021 5.1 (H) 2.5 - 4.5 mg/dL Final   08/08/2021 6.2 (H) 2.5 - 4.5 mg/dL Final   08/07/2021 8.7 (H) 2.5 - 4.5 mg/dL Final     No results for input(s): PH, PO2, PCO2, HCO3, BE, O2SAT in the last 72 hours. RADIOLOGY:  XR CHEST PORTABLE   Final Result   Left internal jugular central line tip projects over the right atrium. No   evidence of pneumothorax. PROBLEM LIST:  Active Problems:    DKA, type 1, not at goal [facility])  Resolved Problems:    * No resolved hospital problems.  *      ATTESTATION:  ICU Staff Physician note of personal involvement in Care  As the attending physician, I certify that I personally reviewed the patients history and personnally examined the patient to confirm the physical findings described above,  And that I

## 2021-08-08 NOTE — CONSULTS
Pulmonary/Critical Care Consult Note    CHIEF COMPLAINT: Generalized abdominal pain, weakness, dysuria, and hyperglycemia    HISTORY OF PRESENT ILLNESS: Patient is a 73-year-old female with a history of end-stage renal disease on peritoneal dialysis, type 1 diabetes, DKA, MRSA, MDRO, BC, iron deficiency, congestive heart failure, hypothyroidism, hyperlipidemia, seizures, endocarditis, and drug use. She presented to the ED after blood glucose reading above 500 at 0700. Patient states she used her insulin but the blood glucose did not improve. She also notes dysuria over the past week and generalized abdominal pain. Denies fever, chills, flank pain, hematuria. Patient states she did have vomiting but denies diarrhea or constipation. Patient has multiple admissions for DKA in the past and poorly controlled diabetes at home. Initial labs in the ED showed sodium of 124, potassium 4.9, CO2 13, BUN 57, creatinine 8.9, anion gap 34, lactic acid 3.6, glucose 1188. CBC WBC count 14.9 with a left shift. Hemoglobin 7.9 and hematocrit 29.8. We will obtain urinalysis if possible to rule out UTI.   ALLERGY:  Cefepime and Toradol [ketorolac tromethamine]    FAMILY HISTORY:  Family History   Problem Relation Age of Onset   [facility] Asthma Mother     Hypertension Mother     High Blood Pressure Mother     Diabetes Mother     Asthma Brother     High Blood Pressure Father        SOCIAL HISTORY:  Social History     Socioeconomic History    Marital status: Single     Spouse name: Not on file    Number of children: 2    Years of education: Not on file    Highest education level: Not on file   Occupational History    Not on file   Tobacco Use    Smoking status: Former Smoker     Packs/day: 0.50     Years: 6.00     Pack years: 3.00     Types: Cigarettes     Quit date: 2021     Years since quittin.5    Smokeless tobacco: Never Used    Tobacco comment: vaper cig   Vaping Use    Vaping Use: Never used   Substance and Sexual Activity    Alcohol use: No    Drug use: No     Comment: documented prior history of opioid abuse    Sexual activity: Yes     Partners: Male   Other Topics Concern    Not on file   Social History Narrative    Not on file     Social Determinants of Health     Financial Resource Strain:     Difficulty of Paying Living Expenses:    Food Insecurity:     Worried About Running Out of Food in the Last Year:     [address] in the Last Year:    Transportation Needs:     Lack of Transportation (Medical):      Lack of Transportation (Non-Medical):    Physical Activity:     Days of Exercise per Week:     Minutes of Exercise per Session:    Stress:     Feeling of Stress :    Social Connections:     Frequency of Communication with Friends and Family:     Frequency of Social Gatherings with Friends and Family:     Attends [employer] Services:     Active Member of Clubs or Organizations:     Attends Club or Organization Meetings:     Marital Status:    Intimate Partner Violence:     Fear of Current or Ex-Partner:     Emotionally Abused:     Physically Abused:     Sexually Abused:        MEDICAL HISTORY:  Past Medical History:   Diagnosis Date    Acute congestive heart failure ([facility]     BC (acute kidney injury) ([facility] 10/1/2019    Cardiac arrest ([facility] 2/15/2021    Cephalgia 10/9/2019    Chronic kidney disease     Depression     Diabetes mellitus ([address]     Diabetic gastroparesis associated with type 1 diabetes mellitus ([address] 12/17/2018    Diabetic ketoacidosis ([facility] 8/27/2011    Diabetic ketoacidosis with coma associated with type 1 diabetes mellitus ([address] 6/26/2013    Diabetic polyneuropathy associated with type 1 diabetes mellitus ([facility] 12/27/2020    Drug use complicating pregnancy in third trimester     Endocarditis 10/31/2020    ESRD (end stage renal disease) ([address] 9/29/2020    Hemodialysis patient ([facility]     History of blood transfusion 11/2019    Hyperlipidemia 10/8/2020    Hyperosmolar hyperglycemic state (HHS) ([facility] 2020    Hypothyroidism 10/8/2020    Iron deficiency anemia 10/1/2019    MDRO (multiple drug resistant organisms) resistance     MRSA (methicillin resistant Staphylococcus aureus)     back wound abcess    Non compliance w medication regimen 3/30/2016    Other disorders of kidney and ureter     Pregnancy 3/30/2016    16 weeks    Previous  delivery affecting pregnancy, antepartum 3/2/2017    Previous stillbirth or demise, antepartum 2016    Seizure ([facility] 2020    Severe pre-eclampsia in third trimester 2016    Shock liver 2/15/2021    Valvular endocarditis 11/10/2020    This Diagnosis was added to the Problem List based on transcribed orders from Dr. David Quiroga:   heparin (porcine)  5,000 Units Subcutaneous 3 times per day      dextrose      insulin 7.03 Units/kg/hr (210)    sodium chloride      dextrose 5% and 0.45% NaCl with KCl 20 mEq       glucose, dextrose, glucagon (rDNA), dextrose, potassium chloride, magnesium sulfate, sodium phosphate IVPB **OR** sodium phosphate IVPB **OR** sodium phosphate IVPB, polyethylene glycol, dextrose 5% and 0.45% NaCl with KCl 20 mEq    REVIEW OF SYSTEMS:  Constitutional: Denies fever, weight loss, night sweats, and reports fatigue  Skin: Denies pigmentation, dark lesions, and rashes   HEENT: Denies hearing loss, tinnitus, ear drainage, epistaxis, sore throat, and hoarseness. Cardiovascular: Denies palpitations, chest pain, and chest pressure. Respiratory: Denies cough, dyspnea at rest, hemoptysis, apnea, and choking. Gastrointestinal: Reports nausea, vomiting, and poor appetite, denies diarrhea, heartburn or reflux  Genitourinary: Reports dysuria, frequency, and urgency.   Denies flank pain or hematuria  Musculoskeletal: Reports generalized myalgias, muscle weakness, and bone pain  Neurological: Denies dizziness, vertigo, reports headache, and focal weakness  Psychological: Denies anxiety and depression. Endocrine: Denies heat intolerance and cold intolerance  Hematopoietic/Lymphatic: Denies bleeding problems and blood transfusions    PHYSICAL EXAM:  Vitals:    08/07/21 2300   BP: (!) 109/55   Pulse: 107   Resp: 24   Temp:    SpO2:            O2 Device: None (Room air)    Constitutional: Patient is tearful. No fever, chills, diaphoresis  HEENT: No head lesions, PERRL, EOMI, mouth without lesions, no nasal lesions, no cervical adenopathy or thyromegaly palpated   Respiratory: Lungs with equal breath sounds bilaterally, no adventitious sounds auscultated, no accessory muscle use   CV: Regular rate and rhythm, S1 and S2 noted, no murmurs or extra heart sounds, no JVD, no leg edema   Abdomen: Soft, diffusely tender, + bowel sounds, no lesions   Skin: Adequate turgor, no rash, capillary refill <2 seconds   Extremities: Muscular strength 4/4 in 4 limbs, moves 4 limbs spontaneously, distal pulses present   Neurology: Awake and alert, follows commands, moves 4 limbs on command and spontaneously, equal sensation, no dysmetria, neck is supple, no meningitic signs present.        LABS:    WBC   Date Value Ref Range Status   08/07/2021 14.9 (H) 4.5 - 11.5 E9/L Final   07/14/2021 5.7 4.5 - 11.5 E9/L Final   07/12/2021 5.4 4.5 - 11.5 E9/L Final     Hemoglobin   Date Value Ref Range Status   08/07/2021 7.9 (L) 11.5 - 15.5 g/dL Final   07/14/2021 8.0 (L) 11.5 - 15.5 g/dL Final   07/12/2021 8.4 (L) 11.5 - 15.5 g/dL Final     Hematocrit   Date Value Ref Range Status   08/07/2021 29.8 (L) 34.0 - 48.0 % Final   07/14/2021 25.4 (L) 34.0 - 48.0 % Final   07/12/2021 25.2 (L) 34.0 - 48.0 % Final     MCV   Date Value Ref Range Status   08/07/2021 113.7 (H) 80.0 - 99.9 fL Final   07/14/2021 97.7 80.0 - 99.9 fL Final   07/12/2021 95.5 80.0 - 99.9 fL Final     Platelets   Date Value Ref Range Status   08/07/2021 322 130 - 450 E9/L Final   07/14/2021 149 130 - 450 E9/L Final 07/12/2021 184 130 - 450 E9/L Final     Sodium   Date Value Ref Range Status   08/07/2021 125 (L) 132 - 146 mmol/L Final   08/07/2021 126 (L) 132 - 146 mmol/L Final   08/07/2021 124 (L) 132 - 146 mmol/L Final     Potassium   Date Value Ref Range Status   08/07/2021 4.0 3.5 - 5.0 mmol/L Final   08/07/2021 4.8 3.5 - 5.0 mmol/L Final     Potassium reflex Magnesium   Date Value Ref Range Status   08/07/2021 4.9 3.5 - 5.0 mmol/L Final   07/15/2021 3.8 3.5 - 5.0 mmol/L Final   07/14/2021 3.9 3.5 - 5.0 mmol/L Final     Chloride   Date Value Ref Range Status   08/07/2021 84 (L) 98 - 107 mmol/L Final   08/07/2021 82 (L) 98 - 107 mmol/L Final   08/07/2021 77 (L) 98 - 107 mmol/L Final     CO2   Date Value Ref Range Status   08/07/2021 11 (L) 22 - 29 mmol/L Final   08/07/2021 13 (L) 22 - 29 mmol/L Final   08/07/2021 13 (L) 22 - 29 mmol/L Final     BUN   Date Value Ref Range Status   08/07/2021 53 (H) 6 - 20 mg/dL Final   08/07/2021 56 (H) 6 - 20 mg/dL Final   08/07/2021 57 (H) 6 - 20 mg/dL Final     CREATININE   Date Value Ref Range Status   08/07/2021 8.6 (HH) 0.5 - 1.0 mg/dL Final   08/07/2021 8.7 (HH) 0.5 - 1.0 mg/dL Final   08/07/2021 8.9 (HH) 0.5 - 1.0 mg/dL Final     Glucose   Date Value Ref Range Status   08/07/2021 1,341 (HH) 74 - 99 mg/dL Final   08/07/2021 1,184 (HH) 74 - 99 mg/dL Final   08/07/2021  mg/dL Final     Comment:     RR Hi   05/18/2012 130 (H) 70 - 110 mg/dL Final   04/26/2012 61 (L) 70 - 110 mg/dL Final   04/25/2012 196 (H) 70 - 110 mg/dL Final     Calcium   Date Value Ref Range Status   08/07/2021 6.6 (L) 8.6 - 10.2 mg/dL Final   08/07/2021 6.9 (L) 8.6 - 10.2 mg/dL Final   08/07/2021 7.5 (L) 8.6 - 10.2 mg/dL Final     Total Protein   Date Value Ref Range Status   08/07/2021 5.3 (L) 6.4 - 8.3 g/dL Final   07/09/2021 6.1 (L) 6.4 - 8.3 g/dL Final   07/02/2021 5.8 (L) 6.4 - 8.3 g/dL Final     Albumin   Date Value Ref Range Status   08/07/2021 2.4 (L) 3.5 - 5.2 g/dL Final   07/09/2021 3.4 (L) 3.5 - 5.2 g/dL Final   07/02/2021 3.3 (L) 3.5 - 5.2 g/dL Final   05/18/2012 4.1 3.2 - 4.8 g/dL Final   04/23/2012 4.0 3.2 - 4.8 g/dL Final   04/02/2012 4.8 3.2 - 4.8 g/dL Final     Total Bilirubin   Date Value Ref Range Status   08/07/2021 <0.2 0.0 - 1.2 mg/dL Final   07/09/2021 0.2 0.0 - 1.2 mg/dL Final   07/02/2021 <0.2 0.0 - 1.2 mg/dL Final     Alkaline Phosphatase   Date Value Ref Range Status   08/07/2021 230 (H) 35 - 104 U/L Final   07/09/2021 190 (H) 35 - 104 U/L Final   07/02/2021 142 (H) 35 - 104 U/L Final     AST   Date Value Ref Range Status   08/07/2021 13 0 - 31 U/L Final   07/09/2021 30 0 - 31 U/L Final     Comment:     Specimen is slightly Hemolyzed. Result may be artificially increased. 07/02/2021 53 (H) 0 - 31 U/L Final     Comment:     Specimen is moderately Hemolyzed. Result may be artificially increased. ALT   Date Value Ref Range Status   08/07/2021 23 0 - 32 U/L Final   07/09/2021 22 0 - 32 U/L Final   07/02/2021 50 (H) 0 - 32 U/L Final     GFR Non-   Date Value Ref Range Status   08/07/2021 7 >=60 mL/min/1.73 Final     Comment:     Chronic Kidney Disease: less than 60 ml/min/1.73 sq.m. Kidney Failure: less than 15 ml/min/1.73 sq.m. Results valid for patients 18 years and older. 08/07/2021 7 >=60 mL/min/1.73 Final     Comment:     Chronic Kidney Disease: less than 60 ml/min/1.73 sq.m. Kidney Failure: less than 15 ml/min/1.73 sq.m. Results valid for patients 18 years and older. 08/07/2021 6 >=60 mL/min/1.73 Final     Comment:     Chronic Kidney Disease: less than 60 ml/min/1.73 sq.m. Kidney Failure: less than 15 ml/min/1.73 sq.m. Results valid for patients 18 years and older.        GFR    Date Value Ref Range Status   08/07/2021 7  Final   08/07/2021 7  Final   08/07/2021 6  Final     Magnesium   Date Value Ref Range Status   07/12/2021 1.8 1.6 - 2.6 mg/dL Final   07/10/2021 2.0 1.6 - 2.6 mg/dL Final   07/10/2021 1.9 1.6 - 2.6 mg/dL Final     Phosphorus   Date Value Ref Range Status   08/07/2021 8.7 (H) 2.5 - 4.5 mg/dL Final   07/12/2021 4.6 (H) 2.5 - 4.5 mg/dL Final   07/11/2021 4.3 2.5 - 4.5 mg/dL Final     No results for input(s): PH, PO2, PCO2, HCO3, BE, O2SAT in the last 72 hours. RADIOLOGY:  No orders to display       IMPRESSION:  1. Diabetic ketoacidosis  2. Anion gap metabolic acidosis  3. Chronic kidney disease  4. Hyperglycemia  5. Leukocytosis  6. Macrocytic hypochromic anemia-likely due to end-stage renal disease  7. Hyperphosphatemia  8. Hypoalbuminemia  9. Hypothyroidism      PLAN:    Diabetic ketoacidosis  --Cause: Uncontrolled type 1 diabetes  --Crystalloid administration: Normal saline  --Insulin infusion at 0.1 unit/kg  --BMP, mag, phosphorus every 6 and replace electrolytes as needed  --Glucose containing solution to be started once glucose measurement is 250   --Will start long acting insulin once anion gap closes X2 and will overlap insulin infusion for 1-2 hours. Chronic kidney disease on peritoneal dialysis  --Neurology consult  --Intake and output    Anemia  --Iron TIBC, ferritin, vitamin B12, folate  --CBC daily  --Protonix 40 daily  --Stool for occult blood    Hypothyroidism  --Continue Synthroid      Electronically signed by HEBER Ren CNP on 8/7/2021 at 11:13 PM      ATTESTATION:  ICU Staff Physician note of personal involvement in Care  As the attending physician, I certify that I personally reviewed the patients history and personally examined the patient to confirm the physical findings described above. I discussed the patient's assessment and plan with Randi DRAPER. And that I reviewed the relevant imaging studies and available reports. I also discussed the differential diagnosis and all of the proposed management plans with the patient and individuals accompanying the patient to this visit.   They had the opportunity to ask questions about the proposed management plans and to have those questions answered. This patient has a high probability of sudden, clinically significant deterioration, which requires the highest level of physician preparedness to intervene urgently. I managed/supervised life or organ supporting interventions that required frequent physician assessment. I devoted my full attention to the direct care of this patient for the amount of time indicated below. Time I spent with the family or surrogate(s) is included only if the patient was incapable of providing the necessary information or participating in medical decisions  Time devoted to teaching and to any procedures I billed separately is not included.     CRITICAL CARE TIME  40 minutes    Tc Araya MD  Pulmonary and Critical Care Medicine

## 2021-08-08 NOTE — PROGRESS NOTES
Department of Internal Medicine  General Internal Medicine  Attending Progress Note  Chief Complaint   Patient presents with    Hyperglycemia     abd pain wekness-chronic problems     SUBJECTIVE:    Reports that she is in pain.      OBJECTIVE      Medications    Current Facility-Administered Medications: albuterol (PROVENTIL) nebulizer solution 2.5 mg, 2.5 mg, Nebulization, Q6H PRN  ARIPiprazole (ABILIFY) tablet 5 mg, 5 mg, Oral, Nightly  folic acid (FOLVITE) tablet 1 mg, 1 mg, Oral, Daily  levothyroxine (SYNTHROID) tablet 75 mcg, 75 mcg, Oral, Daily  metoclopramide (REGLAN) tablet 5 mg, 5 mg, Oral, TID  mirtazapine (REMERON) tablet 15 mg, 15 mg, Oral, Nightly  pregabalin (LYRICA) capsule 25 mg, 25 mg, Oral, Daily  rOPINIRole (REQUIP) tablet 0.25 mg, 0.25 mg, Oral, Nightly  sevelamer (RENVELA) tablet 800 mg, 800 mg, Oral, TID WC  HYDROmorphone HCl PF (DILAUDID) injection 0.5 mg, 0.5 mg, Intravenous, Q4H PRN  pantoprazole (PROTONIX) injection 40 mg, 40 mg, Intravenous, BID **AND** sodium chloride (PF) 0.9 % injection 10 mL, 10 mL, Intravenous, BID  0.9 % sodium chloride infusion, , Intravenous, PRN  dextrose 10 % infusion, , Intravenous, Continuous  insulin glargine (LANTUS) injection vial 6 Units, 6 Units, Subcutaneous, Daily  insulin lispro (HUMALOG) injection vial 3 Units, 3 Units, Subcutaneous, TID WC  insulin lispro (HUMALOG) injection vial 0-6 Units, 0-6 Units, Subcutaneous, TID WC  insulin lispro (HUMALOG) injection vial 0-3 Units, 0-3 Units, Subcutaneous, Nightly  glucose (GLUTOSE) 40 % oral gel 15 g, 15 g, Oral, PRN  dextrose 50 % IV solution, 12.5 g, Intravenous, PRN  glucagon (rDNA) injection 1 mg, 1 mg, Intramuscular, PRN  dextrose 5 % solution, 100 mL/hr, Intravenous, PRN  insulin regular (HUMULIN R;NOVOLIN R) 100 Units in sodium chloride 0.9 % 100 mL infusion, 0.1 Units/kg/hr, Intravenous, Continuous  potassium chloride 10 mEq/100 mL IVPB (Peripheral Line), 10 mEq, Intravenous, PRN  magnesium sulfate 1000 mg in dextrose 5% 100 mL IVPB, 1,000 mg, Intravenous, PRN  sodium phosphate 10 mmol in dextrose 5 % 250 mL IVPB, 10 mmol, Intravenous, PRN **OR** sodium phosphate 15 mmol in dextrose 5 % 250 mL IVPB, 15 mmol, Intravenous, PRN **OR** sodium phosphate 20 mmol in dextrose 5 % 500 mL IVPB, 20 mmol, Intravenous, PRN  polyethylene glycol (GLYCOLAX) packet 17 g, 17 g, Oral, Daily PRN  heparin (porcine) injection 5,000 Units, 5,000 Units, Subcutaneous, 3 times per day  0.9 % sodium chloride infusion, , Intravenous, Continuous  dextrose 5 % and 0.45 % NaCl with KCl 20 mEq infusion, , Intravenous, Continuous PRN  Physical    VITALS:  BP 80/62   Pulse 88   Temp 97.8 °F (36.6 °C) (Oral)   Resp 8   Ht 5' 4\" (1.626 m)   Wt 155 lb (70.3 kg)   SpO2 98%   BMI 26.61 kg/m²   CONSTITUTIONAL:  awake, alert, cooperative, no apparent distress, and appears stated age  EYES:  Lids and lashes normal, pupils equal, round and reactive to light, extra ocular muscles intact, sclera clear, conjunctiva normal  ENT:  normocepalic, without obvious abnormality  HEMATOLOGIC/LYMPHATICS:  no cervical lymphadenopathy  LUNGS:  No increased work of breathing, good air exchange, clear to auscultation bilaterally, no crackles or wheezing  CARDIOVASCULAR:  Normal apical impulse, regular rate and rhythm, normal S1 and S2, no S3 or S4, and no murmur noted  ABDOMEN:  No scars, normal bowel sounds, soft, non-distended, non-tender, no masses palpated, no hepatosplenomegally  MUSCULOSKELETAL:  there is no redness, warmth, or swelling of the joints  NEUROLOGIC:  No focal neuro deficit  SKIN:  no bruising or bleeding  Data    CBC:   Lab Results   Component Value Date    WBC 12.2 08/08/2021    RBC 2.09 08/08/2021    HGB 8.3 08/08/2021    HCT 24.4 08/08/2021    MCV 94.3 08/08/2021    MCH 31.1 08/08/2021    MCHC 33.0 08/08/2021    RDW 14.8 08/08/2021     08/08/2021    MPV 10.5 08/08/2021     BMP:    Lab Results   Component Value Date     08/08/2021    K 3.7 08/08/2021    K 4.9 08/07/2021     08/08/2021    CO2 23 08/08/2021    BUN 49 08/08/2021    LABALBU 2.4 08/07/2021    LABALBU 4.1 05/18/2012    CREATININE 8.2 08/08/2021    CALCIUM 6.4 08/08/2021    GFRAA 7 08/08/2021    LABGLOM 7 08/08/2021    GLUCOSE 109 08/08/2021    GLUCOSE 130 05/18/2012       ASSESSMENT AND PLAN        1.  DKA, type 1, not at goal [facility])  Gap has closed. Now bridging heparin  Continue to monitor    2. ESRD on PD:  Continue peritoneal dialysis    3. Chronic Pain: on dilaudid    4. Anemia of chronic medical disease: stable continue to monitor    5. Hypothyroidism: on synthroid. 6.  Hypotension: currently asymptomatic.

## 2021-08-08 NOTE — CONSULTS
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  In the emergency room she was found to have a glucose level of 1341, beta hydroxybutyrate of >4.5  She was admitted to MICU.               Past Medical History:        Diagnosis Date    Acute congestive heart failure ([address]     BC (acute kidney injury) ([address] 10/1/2019    Cardiac arrest ([address] 2/15/2021    Cephalgia 10/9/2019    Chronic kidney disease     Depression     Diabetes mellitus ([address]     Diabetic gastroparesis associated with type 1 diabetes mellitus ([address] 12/17/2018    Diabetic ketoacidosis ([address] 8/27/2011    Diabetic ketoacidosis with coma associated with type 1 diabetes mellitus ([address] 6/26/2013    Diabetic polyneuropathy associated with type 1 diabetes mellitus ([address] 12/27/2020    Drug use complicating pregnancy in third trimester     Endocarditis 10/31/2020    ESRD (end stage renal disease) ([address] 9/29/2020    Hemodialysis patient ([address]     History of blood transfusion 11/2019    Hyperlipidemia 10/8/2020    Hyperosmolar hyperglycemic state (HHS) ([address] 11/20/2020    Hypothyroidism 10/8/2020    Iron deficiency anemia 10/1/2019    MDRO (multiple drug resistant organisms) resistance     MRSA (methicillin resistant Staphylococcus aureus)     back wound abcess    Non compliance w medication regimen 3/30/2016    Other disorders of kidney and ureter     Pregnancy 3/30/2016    16 weeks    Previous  delivery affecting pregnancy, antepartum 3/2/2017    Previous stillbirth or demise, antepartum 2016    Seizure ([address] 2020    Severe pre-eclampsia in third trimester 2016    Shock liver 2/15/2021    Valvular endocarditis 11/10/2020    This Diagnosis was added to the Problem List based on transcribed orders from Dr. Annamaria Osei        Past Surgical History:        Procedure Laterality Date    BACK SURGERY      abscess   [address]      x2    CHOLECYSTECTOMY, LAPAROSCOPIC N/A 2019    CHOLECYSTECTOMY LAPAROSCOPIC performed by Sunshine Barber MD at [facility] N/A 2018    COLONOSCOPY WITH BIOPSY performed by James Garza MD at [facility] N/A 2018    COLONOSCOPY WITH BIOPSY performed by Lakshmi Da Silva MD at [address] [facility]  2012    EF 57%    ECHO COMPL W DOP COLOR FLOW  6/10/2013         EMBOLECTOMY N/A 2020    [address] [place] [address], [address] [place] [address], YULISSA -- REQS ROOM 3 performed by Luiz West MD at [address] [place] [address] Left 2021    LEFT LEG INCISION AND DRAINAGE, DEBRIDEMENT, WOUND VAC APPLICATION performed by Roc Kim DPM at [address] [place] [address] 2021    LEFT LEG DEBRIDEMENT BIOPSY POSS APPLICATION WOUND VAC performed by Roc Kim DPM at [place] [address] N/A 2020    LAPAROSCOPIC INSERTION PERITONEAL DIALYSIS CATHETER performed by Nia Matias MD at [place] [address] ESOPHAGOGASTRODUODENOSCOPY TRANSORAL DIAGNOSTIC N/A 2018    EGD ESOPHAGOGASTRODUODENOSCOPY performed by James Garza MD at [id number] [place] [address] TRANSESOPHAGEAL ECHOCARDIOGRAM N/A 10/19/2020    TRANSESOPHAGEAL ECHOCARDIOGRAM WITH BUBBLE STUDY performed by Jase Torres MD at [facility] [address] [address]  2018    UPPER GASTROINTESTINAL ENDOSCOPY  2018 EGD BIOPSY performed by Jimbo Mayes MD at [facility] N/A 10/11/2019    EGD ESOPHAGOGASTRODUODENOSCOPY performed by Syed Burrows DO at [facility] N/A 7/14/2021    EGD BIOPSY performed by Saturnino Huang MD at [place] [address]     Current Medications:    Current Facility-Administered Medications: albuterol (PROVENTIL) nebulizer solution 2.5 mg, 2.5 mg, Nebulization, Q6H PRN  ARIPiprazole (ABILIFY) tablet 5 mg, 5 mg, Oral, Nightly  folic acid (FOLVITE) tablet 1 mg, 1 mg, Oral, Daily  levothyroxine (SYNTHROID) tablet 75 mcg, 75 mcg, Oral, Daily  metoclopramide (REGLAN) tablet 5 mg, 5 mg, Oral, TID  mirtazapine (REMERON) tablet 15 mg, 15 mg, Oral, Nightly  pregabalin (LYRICA) capsule 25 mg, 25 mg, Oral, Daily  rOPINIRole (REQUIP) tablet 0.25 mg, 0.25 mg, Oral, Nightly  sevelamer (RENVELA) tablet 800 mg, 800 mg, Oral, TID WC  HYDROmorphone HCl PF (DILAUDID) injection 0.5 mg, 0.5 mg, Intravenous, Q4H PRN  pantoprazole (PROTONIX) injection 40 mg, 40 mg, Intravenous, BID **AND** sodium chloride (PF) 0.9 % injection 10 mL, 10 mL, Intravenous, BID  0.9 % sodium chloride infusion, , Intravenous, PRN  dextrose 10 % infusion, , Intravenous, Continuous  insulin glargine (LANTUS) injection vial 6 Units, 6 Units, Subcutaneous, Daily  insulin lispro (HUMALOG) injection vial 3 Units, 3 Units, Subcutaneous, TID WC  insulin lispro (HUMALOG) injection vial 0-6 Units, 0-6 Units, Subcutaneous, TID WC  insulin lispro (HUMALOG) injection vial 0-3 Units, 0-3 Units, Subcutaneous, Nightly  glucose (GLUTOSE) 40 % oral gel 15 g, 15 g, Oral, PRN  dextrose 50 % IV solution, 12.5 g, Intravenous, PRN  glucagon (rDNA) injection 1 mg, 1 mg, Intramuscular, PRN  dextrose 5 % solution, 100 mL/hr, Intravenous, PRN  insulin regular (HUMULIN R;NOVOLIN R) 100 Units in sodium chloride 0.9 % 100 mL infusion, 0.1 Units/kg/hr, Intravenous, Continuous  potassium chloride 10 mEq/100 mL IVPB (Peripheral Line), 10 mEq, Intravenous, PRN  magnesium sulfate 1000 mg in dextrose 5% 100 mL IVPB, 1,000 mg, Intravenous, PRN  sodium phosphate 10 mmol in dextrose 5 % 250 mL IVPB, 10 mmol, Intravenous, PRN **OR** sodium phosphate 15 mmol in dextrose 5 % 250 mL IVPB, 15 mmol, Intravenous, PRN **OR** sodium phosphate 20 mmol in dextrose 5 % 500 mL IVPB, 20 mmol, Intravenous, PRN  polyethylene glycol (GLYCOLAX) packet 17 g, 17 g, Oral, Daily PRN  heparin (porcine) injection 5,000 Units, 5,000 Units, Subcutaneous, 3 times per day  0.9 % sodium chloride infusion, , Intravenous, Continuous  dextrose 5 % and 0.45 % NaCl with KCl 20 mEq infusion, , Intravenous, Continuous PRN  Allergies:  Cefepime and Toradol [ketorolac tromethamine]    Social History:    TOBACCO:  Never used tobacco  ETOH:  Never drank alcohol  DRUGS:  Never used recreational drugs    Family History:       Problem Relation Age of Onset    Asthma Mother     Hypertension Mother     High Blood Pressure Mother     Diabetes Mother     Asthma Brother     High Blood Pressure Father      REVIEW OF SYSTEMS:    CONSTITUTIONAL:  positive for  fatigue, malaise and anorexia  EYES:  negative  HEENT:  negative  RESPIRATORY:  negative  CARDIOVASCULAR:  negative  PHYSICAL EXAM:      Vitals:    VITALS:  BP 80/62   Pulse 88   Temp 97.8 °F (36.6 °C) (Oral)   Resp 8   Ht 5' 4\" (1.626 m)   Wt 155 lb (70.3 kg)   SpO2 98%   BMI 26.61 kg/m²   8HR PULSE OXIMETRY RANGE:  SpO2  Av.7 %  Min: 97 %  Max: 100 %    PD Catheter Exam:  PD catheter exit site clean    Constitutional: Awake in no acute distress  HEENT:  Normocephalic, PERRL  Respiratory: Decreased breath sounds on the basis  Cardiovascular/Edema:  RRR, S1/S2  Gastrointestinal:  Soft, PD catheter exit site clean   Neurologic:  Nonfocal, AVELAR  Skin:  Warm, dry, no lesions  Other:  no edema    DATA:    CBC:   Lab Results   Component Value Date    WBC 12.2 2021    RBC 2.09 2021    HGB 8.3 08/08/2021    HCT 24.4 08/08/2021    MCV 94.3 08/08/2021    MCH 31.1 08/08/2021    MCHC 33.0 08/08/2021    RDW 14.8 08/08/2021     08/08/2021    MPV 10.5 08/08/2021     CMP:    Lab Results   Component Value Date     08/08/2021    K 3.7 08/08/2021    K 4.9 08/07/2021     08/08/2021    CO2 23 08/08/2021    BUN 49 08/08/2021    CREATININE 8.2 08/08/2021    GFRAA 7 08/08/2021    LABGLOM 7 08/08/2021    GLUCOSE 109 08/08/2021    GLUCOSE 130 05/18/2012    PROT 5.3 08/07/2021    LABALBU 2.4 08/07/2021    LABALBU 4.1 05/18/2012    CALCIUM 6.4 08/08/2021    BILITOT <0.2 08/07/2021    ALKPHOS 230 08/07/2021    AST 13 08/07/2021    ALT 23 08/07/2021     Magnesium:    Lab Results   Component Value Date    MG 1.6 08/08/2021     Phosphorus:    Lab Results   Component Value Date    PHOS 5.4 08/08/2021     U/A:    Lab Results   Component Value Date    COLORU Yellow 06/29/2021    PROTEINU >=300 06/29/2021    PHUR 6.0 06/29/2021    LABCAST RARE 01/12/2020    [address] PACKED 06/29/2021    WBCUA 0-1 05/18/2012    RBCUA 2-5 06/29/2021    RBCUA 1-3 08/01/2013    MUCUS Present 02/12/2016    TRICHOMONAS Present 07/17/2020    YEAST RARE 05/24/2018    BACTERIA MANY 06/29/2021    CLARITYU CLOUDY 06/29/2021    SPECGRAV 1.020 06/29/2021    LEUKOCYTESUR LARGE 06/29/2021    UROBILINOGEN 0.2 06/29/2021    BILIRUBINUR Negative 06/29/2021    BILIRUBINUR SMALL 05/18/2012    BLOODU LARGE 06/29/2021    GLUCOSEU Negative 06/29/2021    GLUCOSEU >=1000 05/18/2012    AMORPHOUS RARE 11/01/2019     Radiology Review:    Left internal jugular central line tip projects over the right atrium.  The   lungs are underinflated, resulting in vascular crowding and subsegmental   atelectasis.  No focal consolidation, pleural effusion or pneumothorax.  The   cardiac silhouette and mediastinal contours are within normal limits.  No   acute bony abnormality.           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  In the emergency room she was found to have a glucose level of 1341, beta hydroxybutyrate of >4.5  She was admitted to MICU. 1. ESRD on peritoneal dialysis, to continue CCPD 4 exchanges over 10 hours of 1.5% with long dwell of 2.5%  2. HTN, on hydralazine 25 mg three times daily and metoprolol 25 mg daily  3. Hx of UTI  4. MBD of CKD, on sevelamer  5.  Anemia of CKD,     Plan:     · CCPD 4 exchanges over 10 hours of 1.5% with long dwell of 2.5%  · Monitor electrolytes  · Check urine culture   · Agree with NS at 250 ml/hr with insulin drip and D10 drip . monitor pulmonary status  · Insulin drip as per ICU  · Epoetin alpha 3000 units 3 times a week      Discussed with KIM Miller MD

## 2021-08-09 ENCOUNTER — APPOINTMENT (OUTPATIENT)
Dept: GENERAL RADIOLOGY | Age: 31
DRG: 420 | End: 2021-08-09
Payer: COMMERCIAL

## 2021-08-09 LAB
ANION GAP SERPL CALCULATED.3IONS-SCNC: 13 MMOL/L (ref 7–16)
BUN BLDV-MCNC: 43 MG/DL (ref 6–20)
CALCIUM SERPL-MCNC: 7.2 MG/DL (ref 8.6–10.2)
CHLORIDE BLD-SCNC: 100 MMOL/L (ref 98–107)
CO2: 21 MMOL/L (ref 22–29)
CREAT SERPL-MCNC: 8.4 MG/DL (ref 0.5–1)
GFR AFRICAN AMERICAN: 7
GFR NON-AFRICAN AMERICAN: 7 ML/MIN/1.73
GLUCOSE BLD-MCNC: 340 MG/DL (ref 74–99)
MAGNESIUM: 1.6 MG/DL (ref 1.6–2.6)
METER GLUCOSE: 142 MG/DL (ref 74–99)
METER GLUCOSE: 145 MG/DL (ref 74–99)
METER GLUCOSE: 256 MG/DL (ref 74–99)
METER GLUCOSE: 79 MG/DL (ref 74–99)
PHOSPHORUS: 4.3 MG/DL (ref 2.5–4.5)
POTASSIUM SERPL-SCNC: 3.7 MMOL/L (ref 3.5–5)
SODIUM BLD-SCNC: 134 MMOL/L (ref 132–146)

## 2021-08-09 PROCEDURE — 90945 DIALYSIS ONE EVALUATION: CPT | Performed by: INTERNAL MEDICINE

## 2021-08-09 PROCEDURE — 74230 X-RAY XM SWLNG FUNCJ C+: CPT

## 2021-08-09 PROCEDURE — 83735 ASSAY OF MAGNESIUM: CPT

## 2021-08-09 PROCEDURE — 6360000002 HC RX W HCPCS: Performed by: INTERNAL MEDICINE

## 2021-08-09 PROCEDURE — 6370000000 HC RX 637 (ALT 250 FOR IP): Performed by: INTERNAL MEDICINE

## 2021-08-09 PROCEDURE — 84100 ASSAY OF PHOSPHORUS: CPT

## 2021-08-09 PROCEDURE — 1200000000 HC SEMI PRIVATE

## 2021-08-09 PROCEDURE — 2580000003 HC RX 258: Performed by: NURSE PRACTITIONER

## 2021-08-09 PROCEDURE — 80048 BASIC METABOLIC PNL TOTAL CA: CPT

## 2021-08-09 PROCEDURE — 82962 GLUCOSE BLOOD TEST: CPT

## 2021-08-09 PROCEDURE — 2500000003 HC RX 250 WO HCPCS: Performed by: INTERNAL MEDICINE

## 2021-08-09 PROCEDURE — C9113 INJ PANTOPRAZOLE SODIUM, VIA: HCPCS | Performed by: NURSE PRACTITIONER

## 2021-08-09 PROCEDURE — 36415 COLL VENOUS BLD VENIPUNCTURE: CPT

## 2021-08-09 PROCEDURE — 92611 MOTION FLUOROSCOPY/SWALLOW: CPT | Performed by: SPEECH-LANGUAGE PATHOLOGIST

## 2021-08-09 PROCEDURE — 36592 COLLECT BLOOD FROM PICC: CPT

## 2021-08-09 PROCEDURE — 99233 SBSQ HOSP IP/OBS HIGH 50: CPT | Performed by: INTERNAL MEDICINE

## 2021-08-09 PROCEDURE — 92526 ORAL FUNCTION THERAPY: CPT | Performed by: SPEECH-LANGUAGE PATHOLOGIST

## 2021-08-09 PROCEDURE — 6360000002 HC RX W HCPCS: Performed by: NURSE PRACTITIONER

## 2021-08-09 RX ORDER — INSULIN GLARGINE 100 [IU]/ML
7 INJECTION, SOLUTION SUBCUTANEOUS DAILY
Status: DISCONTINUED | OUTPATIENT
Start: 2021-08-10 | End: 2021-08-11 | Stop reason: HOSPADM

## 2021-08-09 RX ORDER — GENTAMICIN SULFATE 1 MG/G
CREAM TOPICAL DAILY
Status: DISCONTINUED | OUTPATIENT
Start: 2021-08-10 | End: 2021-08-11 | Stop reason: HOSPADM

## 2021-08-09 RX ADMIN — SEVELAMER CARBONATE 800 MG: 800 TABLET, FILM COATED ORAL at 17:23

## 2021-08-09 RX ADMIN — SODIUM CHLORIDE, PRESERVATIVE FREE 10 ML: 5 INJECTION INTRAVENOUS at 08:26

## 2021-08-09 RX ADMIN — HYDROMORPHONE HYDROCHLORIDE 0.5 MG: 1 INJECTION, SOLUTION INTRAMUSCULAR; INTRAVENOUS; SUBCUTANEOUS at 20:21

## 2021-08-09 RX ADMIN — SEVELAMER CARBONATE 800 MG: 800 TABLET, FILM COATED ORAL at 12:13

## 2021-08-09 RX ADMIN — ROPINIROLE HYDROCHLORIDE 0.25 MG: 0.25 TABLET, FILM COATED ORAL at 20:17

## 2021-08-09 RX ADMIN — HYDROMORPHONE HYDROCHLORIDE 0.5 MG: 1 INJECTION, SOLUTION INTRAMUSCULAR; INTRAVENOUS; SUBCUTANEOUS at 04:33

## 2021-08-09 RX ADMIN — HEPARIN SODIUM 5000 UNITS: 5000 INJECTION INTRAVENOUS; SUBCUTANEOUS at 06:50

## 2021-08-09 RX ADMIN — METOCLOPRAMIDE 5 MG: 5 TABLET ORAL at 14:21

## 2021-08-09 RX ADMIN — PANTOPRAZOLE SODIUM 40 MG: 40 INJECTION, POWDER, FOR SOLUTION INTRAVENOUS at 20:20

## 2021-08-09 RX ADMIN — INSULIN LISPRO 3 UNITS: 100 INJECTION, SOLUTION INTRAVENOUS; SUBCUTANEOUS at 12:15

## 2021-08-09 RX ADMIN — BARIUM SULFATE 45 G: 0.6 CREAM ORAL at 13:35

## 2021-08-09 RX ADMIN — PANTOPRAZOLE SODIUM 40 MG: 40 INJECTION, POWDER, FOR SOLUTION INTRAVENOUS at 08:23

## 2021-08-09 RX ADMIN — ARIPIPRAZOLE 5 MG: 5 TABLET ORAL at 20:17

## 2021-08-09 RX ADMIN — HYDROMORPHONE HYDROCHLORIDE 0.5 MG: 1 INJECTION, SOLUTION INTRAMUSCULAR; INTRAVENOUS; SUBCUTANEOUS at 14:21

## 2021-08-09 RX ADMIN — INSULIN GLARGINE 6 UNITS: 100 INJECTION, SOLUTION SUBCUTANEOUS at 08:28

## 2021-08-09 RX ADMIN — METOCLOPRAMIDE 5 MG: 5 TABLET ORAL at 20:18

## 2021-08-09 RX ADMIN — SEVELAMER CARBONATE 800 MG: 800 TABLET, FILM COATED ORAL at 08:23

## 2021-08-09 RX ADMIN — PREGABALIN 25 MG: 25 CAPSULE ORAL at 08:23

## 2021-08-09 RX ADMIN — HEPARIN SODIUM 5000 UNITS: 5000 INJECTION INTRAVENOUS; SUBCUTANEOUS at 14:21

## 2021-08-09 RX ADMIN — FOLIC ACID 1 MG: 1 TABLET ORAL at 08:23

## 2021-08-09 RX ADMIN — SODIUM CHLORIDE, PRESERVATIVE FREE 10 ML: 5 INJECTION INTRAVENOUS at 20:20

## 2021-08-09 RX ADMIN — HYDROMORPHONE HYDROCHLORIDE 0.5 MG: 1 INJECTION, SOLUTION INTRAMUSCULAR; INTRAVENOUS; SUBCUTANEOUS at 08:36

## 2021-08-09 RX ADMIN — INSULIN LISPRO 3 UNITS: 100 INJECTION, SOLUTION INTRAVENOUS; SUBCUTANEOUS at 08:28

## 2021-08-09 RX ADMIN — LEVOTHYROXINE SODIUM 75 MCG: 0.07 TABLET ORAL at 06:50

## 2021-08-09 RX ADMIN — MIRTAZAPINE 15 MG: 15 TABLET, FILM COATED ORAL at 20:18

## 2021-08-09 RX ADMIN — INSULIN LISPRO 3 UNITS: 100 INJECTION, SOLUTION INTRAVENOUS; SUBCUTANEOUS at 08:29

## 2021-08-09 RX ADMIN — METOCLOPRAMIDE 5 MG: 5 TABLET ORAL at 08:23

## 2021-08-09 RX ADMIN — BARIUM SULFATE 45 ML: 400 SUSPENSION ORAL at 13:35

## 2021-08-09 RX ADMIN — HEPARIN SODIUM 5000 UNITS: 5000 INJECTION INTRAVENOUS; SUBCUTANEOUS at 22:34

## 2021-08-09 RX ADMIN — INSULIN LISPRO 1 UNITS: 100 INJECTION, SOLUTION INTRAVENOUS; SUBCUTANEOUS at 12:14

## 2021-08-09 RX ADMIN — BARIUM SULFATE 45 G: 0.81 POWDER, FOR SUSPENSION ORAL at 13:34

## 2021-08-09 ASSESSMENT — PAIN SCALES - GENERAL
PAINLEVEL_OUTOF10: 8

## 2021-08-09 ASSESSMENT — PAIN DESCRIPTION - PAIN TYPE: TYPE: CHRONIC PAIN

## 2021-08-09 ASSESSMENT — PAIN DESCRIPTION - LOCATION: LOCATION: GENERALIZED

## 2021-08-09 ASSESSMENT — PAIN DESCRIPTION - DESCRIPTORS: DESCRIPTORS: ACHING;DISCOMFORT

## 2021-08-09 NOTE — PROGRESS NOTES
WBC 12.2 08/08/2021    RBC 2.09 08/08/2021    HGB 8.3 08/08/2021    HCT 24.4 08/08/2021    MCV 94.3 08/08/2021    MCH 31.1 08/08/2021    MCHC 33.0 08/08/2021    RDW 14.8 08/08/2021     08/08/2021    MPV 10.5 08/08/2021     CMP:    Lab Results   Component Value Date     08/09/2021    K 3.7 08/09/2021    K 4.9 08/07/2021     08/09/2021    CO2 21 08/09/2021    BUN 43 08/09/2021    CREATININE 8.4 08/09/2021    GFRAA 7 08/09/2021    LABGLOM 7 08/09/2021    GLUCOSE 340 08/09/2021    GLUCOSE 130 05/18/2012    PROT 5.3 08/07/2021    LABALBU 2.4 08/07/2021    LABALBU 4.1 05/18/2012    CALCIUM 7.2 08/09/2021    BILITOT <0.2 08/07/2021    ALKPHOS 230 08/07/2021    AST 13 08/07/2021    ALT 23 08/07/2021     Magnesium:    Lab Results   Component Value Date    MG 1.6 08/09/2021     Phosphorus:    Lab Results   Component Value Date    PHOS 4.3 08/09/2021       Radiology Review:      NM Gastric Emptying 7/13/2021   Severely delayed gastric emptying.         Vitals swallowing August 9, 2021   Swallowing mechanism grossly within normal limits without evidence of   aspiration.       Please see separate speech pathology report for full discussion of findings   and recommendations.           CXR 8/8/2021   Left internal jugular central line tip projects over the right atrium.  No   evidence of pneumothorax.             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  In the emergency room she was found to have a glucose level of 1341, beta hydroxybutyrate of >4.5  She was admitted to MICU. IMPRESSION/RECOMMENDATIONS:      1.  ESRD on peritoneal dialysis, to continue CCPD   2. HTN, holding BP medications   3. MBD of CKD, on sevelamer  4. Anemia of CKD, to start epoetin alpha 3000 units 3 times a week  5.  Severe gastroparesis, on metoclopramide     Plan:     · Continue CCPD 4 exchanges over 10 hours with long dwell of 2 L all exchanges and 2.5% all   · Discontinue IV fluids  · Continue to monitor electrolytes    · Epoetin alpha 3000 units 3 times a week       Electronically signed by Kody Sears MD on 8/9/2021 at 5:51 PM

## 2021-08-09 NOTE — PROGRESS NOTES
SPEECH/LANGUAGE PATHOLOGY  VIDEOFLUOROSCOPIC STUDY OF SWALLOWING (MBS)   and PLAN OF CARE    PATIENT NAME:  Efrain Barnett  (female)     MRN:  [id number]    :  1990  (36 y.o.)  STATUS:  Inpatient: Room 0333/0333-01    TODAY'S DATE:  2021  REFERRING PROVIDER:   Tasneem STEPHEN PROVIDER ORDER: [place] modified barium swallow with video  Date of order:  21   REASON FOR REFERRAL: sensation of food sticking    EVALUATING THERAPIST: SLAVA Rodríguez      RESULTS:      DYSPHAGIA DIAGNOSIS:  normal pharyngeal swallow function with impaired esophageal phase     DIET RECOMMENDATIONS:  Regular consistency solids with  thin liquids    FEEDING RECOMMENDATIONS:    Assistance level:  No assistance needed     Compensatory strategies recommended: No strategies are recommended at this time     Discussed recommendations with nursing and/or faxed report to referring provider: Yes    SPEECH THERAPY  PLAN OF CARE   The dysphagia POC is established based on physician order and dysphagia diagnosis    Skilled SLP intervention for dysphagia management on acute care 3-5 x per week until goals met, pt plateaus in function and/or discharged from hospital      Conditions Requiring Skilled Therapeutic Intervention for dysphagia:    Patient is performing below her functional baseline d/t her current acute condition, Multiple diagnoses, multiple medications, and increased dependency upon caregivers. SPECIFIC DYSPHAGIA INTERVENTIONS TO INCLUDE:     Training in positioning for improved integrity of swallow  Compensatory strategy training for slow esophageal motility     Specific instructions for next treatment:  development and training of compensatory swallow strategies to improve airway protection and swallow function  Treatment Goals:    Short Term Goals:  Pt will implement identified compensatory swallowing strategies on 90% of opportunities or greater to improve airway protection and swallow function.     Long Term Goals:   Pt will maintain adequate nutrition/hydration via PO intake of the least restrictive oral diet with implementation of safe swallow/ compensatory strategies and decrease signs/symptoms of aspiration to less than 1 x/day.       Patient/family Goal:    Eat and drink without feeling food stick     Plan of care discussed with Patient   The Patient understand(s) the diagnosis, prognosis and plan of care     Rehabilitation Potential/Prognosis: fair                      ADMITTING DIAGNOSIS: DKA, type 1, not at goal [facility]) [E10.10]  Diabetic ketoacidosis without coma associated with type 1 diabetes mellitus ([facility] [E10.10]     VISIT DIAGNOSIS:         PATIENT REPORT/COMPLAINT: food sticks and at times comes back up     [address] [place] [address] Dr:    Past History of Dysphagia?:  none reported    Diet during hospital admission: Regular consistency solids with thin liquids    PROCEDURE:  Consistencies Administered During the Evaluation   Liquids: thin liquid and nectar thick liquid   Solids:  pureed foods and solid foods      Method of Intake:   cup, spoon  Self fed      Position:   Seated, upright, Lateral plane    INSTRUMENTAL ASSESSMENT:    ORAL PREPARATION PHASE:    Within functional limits    ORAL PHASE:   Within functional limits    PHARYNGEAL PHASE:     ONSET TIME       Onset time of the pharyngeal swallow was adequate       PHARYNGEAL RESIDUALS        Vallecula/Pharyngeal Wall           No significant residuals were noted in the vallecula      Pyriform Sinuses      No significant residuals were noted in the pyriform sinuses     LARYNGEAL PENETRATION   Laryngeal penetration was not present during this evaluation    ASPIRATION  Aspiration was not present during this evaluation    PENETRATION-ASPIRATION SCALE (PAS):  THIN 1 = Material does not enter the airway  MILDLY THICK 1 = Material does not enter the airway  MODERATELY THICK item not administered  PUREE 1 = Material does not enter the airway  HARD SOLID 1 = with hyperglycemia, with long-term current use of insulin ([address]    ESRD on peritoneal dialysis ([address]    Hypothyroidism    Hyperlipidemia    Acute cystitis    Nondisplaced fracture of neck of fifth metacarpal bone, left hand, initial encounter for closed fracture    Chronic right-sided low back pain    Endocarditis    Seizure (HCC)    Hyperkalemia    Hypomagnesemia    Hypocalcemia    Intractable nausea and vomiting    Wound of left leg, sequela    Syncope    DKA, type 1, not at goal [facility])    Hyperosmolality    Major depressive disorder    Lactic acid acidosis    Early satiety       Earlene Jaramillo MSCCC/SLP  Speech Language Pathologist  [id number]

## 2021-08-09 NOTE — PROGRESS NOTES
Speech Language Pathology      NAME:  Krysten Martinez  :  1990  DATE: 2021  ROOM:  [facility]-    Patient seen for swallow therapy 15 minutes. patient educated regarding results of MBSS. Reviewed current solid/liquid consistency diet recommendation for   Regular consistency solids with  thin liquids, and discussed compensatory strategies to ensure safe PO intake. Reviewed aspiration precautions. Discussed use of compensatory strategies to compensate for decrease esophageal emptying. Patient was able to return demonstration of compensatory strategies during session. Patient and or family  indicated understanding of all information provided via satisfactory verbal response.     Patient Active Problem List   Diagnosis    Non compliance w medication regimen    Tobacco smoking complicating pregnancy    MTHFR mutation    Diabetic ketoacidosis without coma associated with type 1 diabetes mellitus ([address]    Hypertension    Iron deficiency anemia    Sinus tachycardia    Bladder dysfunction    Hypokalemia    Severe protein-calorie malnutrition (HCC)    Uncontrolled type 1 diabetes mellitus with hyperglycemia, with long-term current use of insulin ([address]    ESRD on peritoneal dialysis ([address]    Hypothyroidism    Hyperlipidemia    Acute cystitis    Nondisplaced fracture of neck of fifth metacarpal bone, left hand, initial encounter for closed fracture    Chronic right-sided low back pain    Endocarditis    Seizure ([address]    Hyperkalemia    Hypomagnesemia    Hypocalcemia    Intractable nausea and vomiting    Wound of left leg, sequela    Syncope    DKA, type 1, not at goal [facility])    Hyperosmolality    Major depressive disorder    Lactic acid acidosis    Early satiety       [id number]  dysphagia tx    Lamar Souza MSCCC/SLP  Speech Language Pathologist  [id number]

## 2021-08-09 NOTE — FLOWSHEET NOTE
08/09/21 0934   Vitals   /60   Temp 98.2 °F (36.8 °C)   Temp Source Oral   Pulse 98   Resp 18   Weight 156 lb 1.4 oz (70.8 kg)   Peritoneal Dialysis (CAPD manual)   Exchange Number 4   Effluent Appearance Clear   Dwell Time (Hours:Minutes) 11   Effluent Volume Out (mL) 2624 ml

## 2021-08-09 NOTE — CARE COORDINATION
CM note: Met with patient for transition of care planning, patient is a frequent readmission, no change in her living situation, does PD at home, gets supplies thru [employer]. Plan at this time is to return with her mother and her children, no needs identified.

## 2021-08-09 NOTE — PROGRESS NOTES
17 g, 17 g, Oral, Daily PRN  heparin (porcine) injection 5,000 Units, 5,000 Units, Subcutaneous, 3 times per day  0.9 % sodium chloride infusion, , Intravenous, Continuous  Physical    VITALS:  BP (!) 111/59   Pulse 99   Temp 98.5 °F (36.9 °C) (Oral)   Resp 16   Ht 5' 4\" (1.626 m)   Wt 155 lb (70.3 kg)   SpO2 97%   BMI 26.61 kg/m²   CONSTITUTIONAL:  awake, alert, cooperative, no apparent distress, and appears stated age  EYES:  Lids and lashes normal, pupils equal, round and reactive to light, extra ocular muscles intact, sclera clear, conjunctiva normal  ENT:  normocepalic, without obvious abnormality  HEMATOLOGIC/LYMPHATICS:  no cervical lymphadenopathy  LUNGS:  No increased work of breathing, good air exchange, clear to auscultation bilaterally, no crackles or wheezing  CARDIOVASCULAR:  Normal apical impulse, regular rate and rhythm, normal S1 and S2, no S3 or S4, and no murmur noted  ABDOMEN:  No scars, normal bowel sounds, soft, non-distended, non-tender, no masses palpated, no hepatosplenomegally  MUSCULOSKELETAL:  there is no redness, warmth, or swelling of the joints  NEUROLOGIC:  No focal neuro deficit  SKIN:  no bruising or bleeding  Data    CBC:   Lab Results   Component Value Date    WBC 12.2 08/08/2021    RBC 2.09 08/08/2021    HGB 8.3 08/08/2021    HCT 24.4 08/08/2021    MCV 94.3 08/08/2021    MCH 31.1 08/08/2021    MCHC 33.0 08/08/2021    RDW 14.8 08/08/2021     08/08/2021    MPV 10.5 08/08/2021     BMP:    Lab Results   Component Value Date     08/09/2021    K 3.7 08/09/2021    K 4.9 08/07/2021     08/09/2021    CO2 21 08/09/2021    BUN 43 08/09/2021    LABALBU 2.4 08/07/2021    LABALBU 4.1 05/18/2012    CREATININE 8.4 08/09/2021    CALCIUM 7.2 08/09/2021    GFRAA 7 08/09/2021    LABGLOM 7 08/09/2021    GLUCOSE 340 08/09/2021    GLUCOSE 130 05/18/2012       ASSESSMENT AND PLAN        1.  DKA, type 1, not at goal [facility])  Gap has closed  Resume home dose of lantus 7 units QHS and Short acting 3 units TID  Continue to monitor on diabetic diet  accucheck ACHS    2. Anemia due to CKD: continue epo  Transfuse if needed    3. Dysphagia: barium swallow ordered  Continue to monitor    4.   ESRD on PD:  Per nephro    Rest of chronic medical condition is stable

## 2021-08-10 ENCOUNTER — APPOINTMENT (OUTPATIENT)
Dept: CT IMAGING | Age: 31
DRG: 420 | End: 2021-08-10
Payer: COMMERCIAL

## 2021-08-10 LAB
ANION GAP SERPL CALCULATED.3IONS-SCNC: 10 MMOL/L (ref 7–16)
BUN BLDV-MCNC: 37 MG/DL (ref 6–20)
CALCIUM SERPL-MCNC: 7.6 MG/DL (ref 8.6–10.2)
CHLORIDE BLD-SCNC: 103 MMOL/L (ref 98–107)
CO2: 25 MMOL/L (ref 22–29)
CREAT SERPL-MCNC: 7.6 MG/DL (ref 0.5–1)
GFR AFRICAN AMERICAN: 8
GFR NON-AFRICAN AMERICAN: 8 ML/MIN/1.73
GLUCOSE BLD-MCNC: 208 MG/DL (ref 74–99)
MAGNESIUM: 1.7 MG/DL (ref 1.6–2.6)
METER GLUCOSE: 116 MG/DL (ref 74–99)
METER GLUCOSE: 198 MG/DL (ref 74–99)
METER GLUCOSE: 272 MG/DL (ref 74–99)
METER GLUCOSE: 68 MG/DL (ref 74–99)
PHOSPHORUS: 4.3 MG/DL (ref 2.5–4.5)
POTASSIUM SERPL-SCNC: 3.3 MMOL/L (ref 3.5–5)
SODIUM BLD-SCNC: 138 MMOL/L (ref 132–146)

## 2021-08-10 PROCEDURE — 2580000003 HC RX 258: Performed by: NURSE PRACTITIONER

## 2021-08-10 PROCEDURE — 83735 ASSAY OF MAGNESIUM: CPT

## 2021-08-10 PROCEDURE — 6370000000 HC RX 637 (ALT 250 FOR IP): Performed by: INTERNAL MEDICINE

## 2021-08-10 PROCEDURE — 6360000002 HC RX W HCPCS: Performed by: INTERNAL MEDICINE

## 2021-08-10 PROCEDURE — 6370000000 HC RX 637 (ALT 250 FOR IP): Performed by: NURSE PRACTITIONER

## 2021-08-10 PROCEDURE — 82962 GLUCOSE BLOOD TEST: CPT

## 2021-08-10 PROCEDURE — 99232 SBSQ HOSP IP/OBS MODERATE 35: CPT | Performed by: INTERNAL MEDICINE

## 2021-08-10 PROCEDURE — 36415 COLL VENOUS BLD VENIPUNCTURE: CPT

## 2021-08-10 PROCEDURE — 1200000000 HC SEMI PRIVATE

## 2021-08-10 PROCEDURE — 36592 COLLECT BLOOD FROM PICC: CPT

## 2021-08-10 PROCEDURE — 84100 ASSAY OF PHOSPHORUS: CPT

## 2021-08-10 PROCEDURE — 6360000002 HC RX W HCPCS: Performed by: NURSE PRACTITIONER

## 2021-08-10 PROCEDURE — 80048 BASIC METABOLIC PNL TOTAL CA: CPT

## 2021-08-10 PROCEDURE — 70490 CT SOFT TISSUE NECK W/O DYE: CPT

## 2021-08-10 PROCEDURE — C9113 INJ PANTOPRAZOLE SODIUM, VIA: HCPCS | Performed by: NURSE PRACTITIONER

## 2021-08-10 RX ORDER — POTASSIUM CHLORIDE 20 MEQ/1
20 TABLET, EXTENDED RELEASE ORAL DAILY
Status: DISCONTINUED | OUTPATIENT
Start: 2021-08-10 | End: 2021-08-11 | Stop reason: HOSPADM

## 2021-08-10 RX ADMIN — INSULIN GLARGINE 7 UNITS: 100 INJECTION, SOLUTION SUBCUTANEOUS at 08:15

## 2021-08-10 RX ADMIN — HYDROMORPHONE HYDROCHLORIDE 0.5 MG: 1 INJECTION, SOLUTION INTRAMUSCULAR; INTRAVENOUS; SUBCUTANEOUS at 18:51

## 2021-08-10 RX ADMIN — INSULIN LISPRO 3 UNITS: 100 INJECTION, SOLUTION INTRAVENOUS; SUBCUTANEOUS at 08:16

## 2021-08-10 RX ADMIN — HYDROMORPHONE HYDROCHLORIDE 0.5 MG: 1 INJECTION, SOLUTION INTRAMUSCULAR; INTRAVENOUS; SUBCUTANEOUS at 02:07

## 2021-08-10 RX ADMIN — PREGABALIN 25 MG: 25 CAPSULE ORAL at 08:10

## 2021-08-10 RX ADMIN — LEVOTHYROXINE SODIUM 75 MCG: 0.07 TABLET ORAL at 06:51

## 2021-08-10 RX ADMIN — POTASSIUM CHLORIDE 20 MEQ: 1500 TABLET, EXTENDED RELEASE ORAL at 17:36

## 2021-08-10 RX ADMIN — INSULIN LISPRO 2 UNITS: 100 INJECTION, SOLUTION INTRAVENOUS; SUBCUTANEOUS at 22:12

## 2021-08-10 RX ADMIN — PANTOPRAZOLE SODIUM 40 MG: 40 INJECTION, POWDER, FOR SOLUTION INTRAVENOUS at 08:10

## 2021-08-10 RX ADMIN — ARIPIPRAZOLE 5 MG: 5 TABLET ORAL at 22:02

## 2021-08-10 RX ADMIN — HEPARIN SODIUM 5000 UNITS: 5000 INJECTION INTRAVENOUS; SUBCUTANEOUS at 06:51

## 2021-08-10 RX ADMIN — EPOETIN ALFA-EPBX 3000 UNITS: 3000 INJECTION, SOLUTION INTRAVENOUS; SUBCUTANEOUS at 01:58

## 2021-08-10 RX ADMIN — SODIUM CHLORIDE, PRESERVATIVE FREE 10 ML: 5 INJECTION INTRAVENOUS at 08:11

## 2021-08-10 RX ADMIN — SEVELAMER CARBONATE 800 MG: 800 TABLET, FILM COATED ORAL at 08:09

## 2021-08-10 RX ADMIN — SODIUM CHLORIDE, PRESERVATIVE FREE 10 ML: 5 INJECTION INTRAVENOUS at 22:06

## 2021-08-10 RX ADMIN — SEVELAMER CARBONATE 800 MG: 800 TABLET, FILM COATED ORAL at 17:36

## 2021-08-10 RX ADMIN — HEPARIN SODIUM 5000 UNITS: 5000 INJECTION INTRAVENOUS; SUBCUTANEOUS at 14:34

## 2021-08-10 RX ADMIN — METOCLOPRAMIDE 5 MG: 5 TABLET ORAL at 14:32

## 2021-08-10 RX ADMIN — GENTAMICIN SULFATE: 1 CREAM TOPICAL at 08:18

## 2021-08-10 RX ADMIN — METOCLOPRAMIDE 5 MG: 5 TABLET ORAL at 08:10

## 2021-08-10 RX ADMIN — SEVELAMER CARBONATE 800 MG: 800 TABLET, FILM COATED ORAL at 12:13

## 2021-08-10 RX ADMIN — PANTOPRAZOLE SODIUM 40 MG: 40 INJECTION, POWDER, FOR SOLUTION INTRAVENOUS at 22:04

## 2021-08-10 RX ADMIN — ROPINIROLE HYDROCHLORIDE 0.25 MG: 0.25 TABLET, FILM COATED ORAL at 22:02

## 2021-08-10 RX ADMIN — HEPARIN SODIUM 5000 UNITS: 5000 INJECTION INTRAVENOUS; SUBCUTANEOUS at 22:11

## 2021-08-10 RX ADMIN — HYDROMORPHONE HYDROCHLORIDE 0.5 MG: 1 INJECTION, SOLUTION INTRAMUSCULAR; INTRAVENOUS; SUBCUTANEOUS at 08:10

## 2021-08-10 RX ADMIN — FOLIC ACID 1 MG: 1 TABLET ORAL at 08:10

## 2021-08-10 RX ADMIN — METOCLOPRAMIDE 5 MG: 5 TABLET ORAL at 22:02

## 2021-08-10 RX ADMIN — HYDROMORPHONE HYDROCHLORIDE 0.5 MG: 1 INJECTION, SOLUTION INTRAMUSCULAR; INTRAVENOUS; SUBCUTANEOUS at 14:32

## 2021-08-10 RX ADMIN — INSULIN LISPRO 1 UNITS: 100 INJECTION, SOLUTION INTRAVENOUS; SUBCUTANEOUS at 08:15

## 2021-08-10 RX ADMIN — MIRTAZAPINE 15 MG: 15 TABLET, FILM COATED ORAL at 22:02

## 2021-08-10 ASSESSMENT — PAIN SCALES - GENERAL
PAINLEVEL_OUTOF10: 8
PAINLEVEL_OUTOF10: 9
PAINLEVEL_OUTOF10: 8
PAINLEVEL_OUTOF10: 8

## 2021-08-10 ASSESSMENT — PAIN DESCRIPTION - PAIN TYPE: TYPE: CHRONIC PAIN

## 2021-08-10 ASSESSMENT — PAIN DESCRIPTION - LOCATION: LOCATION: GENERALIZED

## 2021-08-10 NOTE — FLOWSHEET NOTE
CCPD TERMINATED WITHOUT COMPLICATIONS. ASEPTIC TECHNIQUE USED. DRESSING TO BE CHANGED TONIGHT. EFFLUENT DRAINED CLEAR YELLOW, NO SIGNS OF INFECTION. TOTAL UF- 1666 ML          08/10/21 0745   Vitals   BP (!) 125/91   Temp 98 °F (36.7 °C)   Pulse 90   Resp 16   Weight 154 lb 12.2 oz (70.2 kg)   Peritoneal Dialysis Catheter Left lower abdomen   Placement Date/Time: 10/31/20 0704   Catheter Location: Left lower abdomen   Status Deaccessed   Site Condition No Complications   Dressing Status Clean;Dry; Intact   Dressing Gauze   Cycler   Verification of Prescription CCPD   Ultrafiltration (UF) (mL) 1666 mL

## 2021-08-10 NOTE — FLOWSHEET NOTE
Cycler bedside - set up with all 2.5% Dextrose solution. Aseptically accessed LLQ PD catheter and CCPD initiated w/o complications. 1st drain clear, light yellow. Dressing changed to PD exit site - no redness or drainage. Dressing secured over site. Will order Gentamicin cream for dressing changes. No c/o now that treatment in progress. Floor RN informed treatment running.

## 2021-08-10 NOTE — PROGRESS NOTES
Department of Internal Medicine  General Internal Medicine  Attending Progress Note  Chief Complaint   Patient presents with    Hyperglycemia     abd pain wekness-chronic problems     SUBJECTIVE:    Reports that she is feeling better, but still having a hard time swallowing. She noted that it often feels that food is getting stuck. She is aware that her barium swallow is normal. We discussed potential CT scan of soft tissue neck and if unremarkable that she will be discharged.      OBJECTIVE      Medications    Current Facility-Administered Medications: insulin glargine (LANTUS) injection vial 7 Units, 7 Units, Subcutaneous, Daily  epoetin nena-epbx (RETACRIT) injection 3,000 Units, 3,000 Units, Subcutaneous, Once per day on Mon Wed Fri  gentamicin (GARAMYCIN) 0.1 % cream, , Topical, Daily  albuterol (PROVENTIL) nebulizer solution 2.5 mg, 2.5 mg, Nebulization, Q6H PRN  ARIPiprazole (ABILIFY) tablet 5 mg, 5 mg, Oral, Nightly  folic acid (FOLVITE) tablet 1 mg, 1 mg, Oral, Daily  levothyroxine (SYNTHROID) tablet 75 mcg, 75 mcg, Oral, Daily  metoclopramide (REGLAN) tablet 5 mg, 5 mg, Oral, TID  mirtazapine (REMERON) tablet 15 mg, 15 mg, Oral, Nightly  pregabalin (LYRICA) capsule 25 mg, 25 mg, Oral, Daily  rOPINIRole (REQUIP) tablet 0.25 mg, 0.25 mg, Oral, Nightly  sevelamer (RENVELA) tablet 800 mg, 800 mg, Oral, TID WC  HYDROmorphone HCl PF (DILAUDID) injection 0.5 mg, 0.5 mg, Intravenous, Q4H PRN  pantoprazole (PROTONIX) injection 40 mg, 40 mg, Intravenous, BID **AND** sodium chloride (PF) 0.9 % injection 10 mL, 10 mL, Intravenous, BID  0.9 % sodium chloride infusion, , Intravenous, PRN  insulin lispro (HUMALOG) injection vial 3 Units, 3 Units, Subcutaneous, TID WC  insulin lispro (HUMALOG) injection vial 0-6 Units, 0-6 Units, Subcutaneous, TID WC  insulin lispro (HUMALOG) injection vial 0-3 Units, 0-3 Units, Subcutaneous, Nightly  glucose (GLUTOSE) 40 % oral gel 15 g, 15 g, Oral, PRN  dextrose 50 % IV solution, 12.5 g, Intravenous, PRN  glucagon (rDNA) injection 1 mg, 1 mg, Intramuscular, PRN  dextrose 5 % solution, 100 mL/hr, Intravenous, PRN  polyethylene glycol (GLYCOLAX) packet 17 g, 17 g, Oral, Daily PRN  heparin (porcine) injection 5,000 Units, 5,000 Units, Subcutaneous, 3 times per day  Physical    VITALS:  BP (!) 125/91   Pulse 90   Temp 98 °F (36.7 °C)   Resp 16   Ht 5' 4\" (1.626 m)   Wt 154 lb 12.2 oz (70.2 kg)   SpO2 97%   BMI 26.57 kg/m²   CONSTITUTIONAL:  awake, alert, cooperative, no apparent distress, and appears stated age  EYES:  Lids and lashes normal, pupils equal, round and reactive to light, extra ocular muscles intact, sclera clear, conjunctiva normal  ENT:  normocepalic, without obvious abnormality  BACK:  Symmetric, no curvature, spinous processes are non-tender on palpation, paraspinous muscles are non-tender on palpation, no costal vertebral tenderness  LUNGS:  No increased work of breathing, good air exchange, clear to auscultation bilaterally, no crackles or wheezing  CARDIOVASCULAR:  Normal apical impulse, regular rate and rhythm, normal S1 and S2, no S3 or S4, and no murmur noted  ABDOMEN:  Non tender, non distended  MUSCULOSKELETAL:  there is no redness, warmth, or swelling of the joints  NEUROLOGIC:  No focal neuro deficit  SKIN:  no bruising or bleeding  Data    CBC:   Lab Results   Component Value Date    WBC 12.2 08/08/2021    RBC 2.09 08/08/2021    HGB 8.3 08/08/2021    HCT 24.4 08/08/2021    MCV 94.3 08/08/2021    MCH 31.1 08/08/2021    MCHC 33.0 08/08/2021    RDW 14.8 08/08/2021     08/08/2021    MPV 10.5 08/08/2021     BMP:    Lab Results   Component Value Date     08/10/2021    K 3.3 08/10/2021    K 4.9 08/07/2021     08/10/2021    CO2 25 08/10/2021    BUN 37 08/10/2021    LABALBU 2.4 08/07/2021    LABALBU 4.1 05/18/2012    CREATININE 7.6 08/10/2021    CALCIUM 7.6 08/10/2021    GFRAA 8 08/10/2021    LABGLOM 8 08/10/2021    GLUCOSE 208 08/10/2021    GLUCOSE 130 05/18/2012       ASSESSMENT AND PLAN        1.  DKA, type 1, not at goal ([facility] [address]  BGL is better controlled  Continue to monitor on current home insulin dosing  Diabetic diet    2. ESRD on PD:  Per renal    3. Anemia due to Renal disease:  Continue epo    4. Dysphagia: normal barium swallow  Get CT soft tissue of neck without contrast and if negative will discharge  Continue Speech Therapy recommendations    5. Hypothyroidism: on synthroid    6. GERD: on PPI.     7.  Hypokalemia: will monitor

## 2021-08-10 NOTE — PROGRESS NOTES
Department of Internal Medicine  Nephrology Progress Note    Events reviewed. SUBJECTIVE:  We are following Wilton Flores for ESRD on PD. Reports feeling better.      PHYSICAL EXAM:      Vitals:    VITALS:  /88   Pulse 101   Temp 98 °F (36.7 °C) (Oral)   Resp 18   Ht 5' 4\" (1.626 m)   Wt 156 lb 1.4 oz (70.8 kg)   SpO2 98%   BMI 26.79 kg/m²   24HR BLOOD PRESSURE RANGE:  Systolic (57TJY), IEQ:024 , Min:120 , CHI:827   ; Diastolic (92GKD), DIZ:74, Min:60, Max:88      Intake/Output Summary (Last 24 hours) at 8/10/2021 0636  Last data filed at 8/10/2021 0421  Gross per 24 hour   Intake 1310 ml   Output 2724 ml   Net -1414 ml       PD Catheter Exam:  PD catheter exit site clean    Constitutional: Awake in no acute distress  HEENT:  Normocephalic, PERRL  Respiratory: Decreased breath sounds on the basis  Cardiovascular/Edema:  RRR, S1/S2  Gastrointestinal:  Soft, PD catheter exit site clean   Neurologic:  Nonfocal, AVELAR  Skin:  Warm, dry, no lesions  Other:  no edema    Scheduled Meds:   insulin glargine  7 Units Subcutaneous Daily    epoetin nena-epbx  3,000 Units Subcutaneous Once per day on Mon Wed Fri    gentamicin   Topical Daily    ARIPiprazole  5 mg Oral Nightly    folic acid  1 mg Oral Daily    levothyroxine  75 mcg Oral Daily    metoclopramide  5 mg Oral TID    mirtazapine  15 mg Oral Nightly    pregabalin  25 mg Oral Daily    rOPINIRole  0.25 mg Oral Nightly    sevelamer  800 mg Oral TID WC    pantoprazole  40 mg Intravenous BID    And    sodium chloride (PF)  10 mL Intravenous BID    insulin lispro  3 Units Subcutaneous TID WC    insulin lispro  0-6 Units Subcutaneous TID WC    insulin lispro  0-3 Units Subcutaneous Nightly    heparin (porcine)  5,000 Units Subcutaneous 3 times per day     Continuous Infusions:   sodium chloride      dextrose       PRN Meds:.albuterol, HYDROmorphone, sodium chloride, glucose, dextrose, glucagon (rDNA), dextrose, polyethylene glycol    DATA: CBC:   Lab Results   Component Value Date    WBC 12.2 08/08/2021    RBC 2.09 08/08/2021    HGB 8.3 08/08/2021    HCT 24.4 08/08/2021    MCV 94.3 08/08/2021    MCH 31.1 08/08/2021    MCHC 33.0 08/08/2021    RDW 14.8 08/08/2021     08/08/2021    MPV 10.5 08/08/2021     CMP:    Lab Results   Component Value Date     08/09/2021    K 3.7 08/09/2021    K 4.9 08/07/2021     08/09/2021    CO2 21 08/09/2021    BUN 43 08/09/2021    CREATININE 8.4 08/09/2021    GFRAA 7 08/09/2021    LABGLOM 7 08/09/2021    GLUCOSE 340 08/09/2021    GLUCOSE 130 05/18/2012    PROT 5.3 08/07/2021    LABALBU 2.4 08/07/2021    LABALBU 4.1 05/18/2012    CALCIUM 7.2 08/09/2021    BILITOT <0.2 08/07/2021    ALKPHOS 230 08/07/2021    AST 13 08/07/2021    ALT 23 08/07/2021     Magnesium:    Lab Results   Component Value Date    MG 1.6 08/09/2021     Phosphorus:    Lab Results   Component Value Date    PHOS 4.3 08/09/2021       Radiology Review:      NM Gastric Emptying 7/13/2021   Severely delayed gastric emptying.         Vitals swallowing August 9, 2021   Swallowing mechanism grossly within normal limits without evidence of   aspiration.       Please see separate speech pathology report for full discussion of findings   and recommendations.           CXR 8/8/2021   Left internal jugular central line tip projects over the right atrium.  No   evidence of pneumothorax.             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  In the emergency room she was found to have a glucose level of 1341, beta hydroxybutyrate of >4.5  She was admitted to MICU.    IMPRESSION/RECOMMENDATIONS:      1. ESRD on peritoneal dialysis, to continue CCPD   2. Hypokalemia, start potassium 20 mEq po daily  3. HTN, holding BP medications   4. MBD of CKD, on sevelamer  5. Anemia of CKD, to start epoetin alpha 3000 units 3 times a week  6.  Severe gastroparesis, on metoclopramide     Plan:     · Continue CCPD 4 exchanges over 10 hours with long dwell of 2 L all exchanges and 2.5%   · Start potassium 20 mEq po daily  · Continue to monitor electrolytes    · Epoetin alpha 3000 units 3 times a week  · Glucose control         Electronically signed by HEBER Coe CNP on 8/10/2021 at 6:36 AM

## 2021-08-11 VITALS
WEIGHT: 154.76 LBS | RESPIRATION RATE: 16 BRPM | DIASTOLIC BLOOD PRESSURE: 90 MMHG | BODY MASS INDEX: 26.42 KG/M2 | OXYGEN SATURATION: 95 % | HEART RATE: 96 BPM | HEIGHT: 64 IN | SYSTOLIC BLOOD PRESSURE: 143 MMHG | TEMPERATURE: 98 F

## 2021-08-11 LAB
ANION GAP SERPL CALCULATED.3IONS-SCNC: 10 MMOL/L (ref 7–16)
BUN BLDV-MCNC: 37 MG/DL (ref 6–20)
CALCIUM SERPL-MCNC: 7.6 MG/DL (ref 8.6–10.2)
CHLORIDE BLD-SCNC: 105 MMOL/L (ref 98–107)
CO2: 26 MMOL/L (ref 22–29)
CREAT SERPL-MCNC: 7.3 MG/DL (ref 0.5–1)
GFR AFRICAN AMERICAN: 8
GFR NON-AFRICAN AMERICAN: 8 ML/MIN/1.73
GLUCOSE BLD-MCNC: 325 MG/DL (ref 74–99)
MAGNESIUM: 1.6 MG/DL (ref 1.6–2.6)
METER GLUCOSE: 284 MG/DL (ref 74–99)
PHOSPHORUS: 4.4 MG/DL (ref 2.5–4.5)
POTASSIUM SERPL-SCNC: 3.6 MMOL/L (ref 3.5–5)
SODIUM BLD-SCNC: 141 MMOL/L (ref 132–146)

## 2021-08-11 PROCEDURE — 2580000003 HC RX 258: Performed by: NURSE PRACTITIONER

## 2021-08-11 PROCEDURE — 36415 COLL VENOUS BLD VENIPUNCTURE: CPT

## 2021-08-11 PROCEDURE — 6370000000 HC RX 637 (ALT 250 FOR IP): Performed by: INTERNAL MEDICINE

## 2021-08-11 PROCEDURE — 6360000002 HC RX W HCPCS: Performed by: NURSE PRACTITIONER

## 2021-08-11 PROCEDURE — 82962 GLUCOSE BLOOD TEST: CPT

## 2021-08-11 PROCEDURE — 6370000000 HC RX 637 (ALT 250 FOR IP): Performed by: NURSE PRACTITIONER

## 2021-08-11 PROCEDURE — 6360000002 HC RX W HCPCS: Performed by: INTERNAL MEDICINE

## 2021-08-11 PROCEDURE — 80048 BASIC METABOLIC PNL TOTAL CA: CPT

## 2021-08-11 PROCEDURE — 84100 ASSAY OF PHOSPHORUS: CPT

## 2021-08-11 PROCEDURE — 36592 COLLECT BLOOD FROM PICC: CPT

## 2021-08-11 PROCEDURE — 83735 ASSAY OF MAGNESIUM: CPT

## 2021-08-11 PROCEDURE — C9113 INJ PANTOPRAZOLE SODIUM, VIA: HCPCS | Performed by: NURSE PRACTITIONER

## 2021-08-11 PROCEDURE — 99239 HOSP IP/OBS DSCHRG MGMT >30: CPT | Performed by: INTERNAL MEDICINE

## 2021-08-11 RX ADMIN — PREGABALIN 25 MG: 25 CAPSULE ORAL at 08:49

## 2021-08-11 RX ADMIN — EPOETIN ALFA-EPBX 3000 UNITS: 3000 INJECTION, SOLUTION INTRAVENOUS; SUBCUTANEOUS at 08:50

## 2021-08-11 RX ADMIN — HYDROMORPHONE HYDROCHLORIDE 0.5 MG: 1 INJECTION, SOLUTION INTRAMUSCULAR; INTRAVENOUS; SUBCUTANEOUS at 09:03

## 2021-08-11 RX ADMIN — FOLIC ACID 1 MG: 1 TABLET ORAL at 08:49

## 2021-08-11 RX ADMIN — INSULIN LISPRO 3 UNITS: 100 INJECTION, SOLUTION INTRAVENOUS; SUBCUTANEOUS at 08:55

## 2021-08-11 RX ADMIN — HEPARIN SODIUM 5000 UNITS: 5000 INJECTION INTRAVENOUS; SUBCUTANEOUS at 08:11

## 2021-08-11 RX ADMIN — HYDROMORPHONE HYDROCHLORIDE 0.5 MG: 1 INJECTION, SOLUTION INTRAMUSCULAR; INTRAVENOUS; SUBCUTANEOUS at 04:15

## 2021-08-11 RX ADMIN — SODIUM CHLORIDE, PRESERVATIVE FREE 10 ML: 5 INJECTION INTRAVENOUS at 08:54

## 2021-08-11 RX ADMIN — METOCLOPRAMIDE 5 MG: 5 TABLET ORAL at 08:49

## 2021-08-11 RX ADMIN — SEVELAMER CARBONATE 800 MG: 800 TABLET, FILM COATED ORAL at 08:49

## 2021-08-11 RX ADMIN — INSULIN GLARGINE 7 UNITS: 100 INJECTION, SOLUTION SUBCUTANEOUS at 08:55

## 2021-08-11 RX ADMIN — PANTOPRAZOLE SODIUM 40 MG: 40 INJECTION, POWDER, FOR SOLUTION INTRAVENOUS at 08:50

## 2021-08-11 RX ADMIN — LEVOTHYROXINE SODIUM 75 MCG: 0.07 TABLET ORAL at 08:10

## 2021-08-11 RX ADMIN — INSULIN LISPRO 3 UNITS: 100 INJECTION, SOLUTION INTRAVENOUS; SUBCUTANEOUS at 09:02

## 2021-08-11 RX ADMIN — POTASSIUM CHLORIDE 20 MEQ: 1500 TABLET, EXTENDED RELEASE ORAL at 08:50

## 2021-08-11 RX ADMIN — HYDROMORPHONE HYDROCHLORIDE 0.5 MG: 1 INJECTION, SOLUTION INTRAMUSCULAR; INTRAVENOUS; SUBCUTANEOUS at 00:09

## 2021-08-11 ASSESSMENT — PAIN DESCRIPTION - LOCATION: LOCATION: GENERALIZED

## 2021-08-11 ASSESSMENT — PAIN SCALES - GENERAL
PAINLEVEL_OUTOF10: 8
PAINLEVEL_OUTOF10: 0
PAINLEVEL_OUTOF10: 0
PAINLEVEL_OUTOF10: 8
PAINLEVEL_OUTOF10: 8

## 2021-08-11 ASSESSMENT — PAIN DESCRIPTION - PAIN TYPE: TYPE: CHRONIC PAIN

## 2021-08-11 ASSESSMENT — PAIN DESCRIPTION - DESCRIPTORS: DESCRIPTORS: ACHING;DISCOMFORT;SORE

## 2021-08-11 NOTE — PROGRESS NOTES
CCPD tx completed. Pt disconnected and stay safe cap applied using aseptic technique. Clear pale effluent noted.  Total UF 1566

## 2021-08-11 NOTE — PLAN OF CARE
Problem: Pain:  Goal: Pain level will decrease  Description: Pain level will decrease  Outcome: Met This Shift     Problem: Pain:  Goal: Control of acute pain  Description: Control of acute pain  Outcome: Met This Shift     Problem: Skin Integrity:  Goal: Will show no infection signs and symptoms  Description: Will show no infection signs and symptoms  Outcome: Met This Shift     Problem: Falls - Risk of:  Goal: Will remain free from falls  Description: Will remain free from falls  Outcome: Met This Shift

## 2021-08-11 NOTE — DISCHARGE SUMMARY
Physician Discharge Summary     Patient ID:  Mirna Le  [id number]  36 y.o.  1990    Admit date: 8/7/2021    Discharge date and time: No discharge date for patient encounter. Admitting Physician: Duc Hung DO     Discharge Physician: Jose David Bernalsummer    Admission Diagnoses: DKA, type 1, not at goal [facility]) [E10.10]  Diabetic ketoacidosis without coma associated with type 1 diabetes mellitus ([facility] [address] [E10.10]    Discharge Diagnoses:   1. DKA with severe hyperglycemia  2. Metabolic Acidosis'  3. DM type 1  4. ESRD on Dialysis  5. Hx of Hypertension Essential  6. Chronic Anemia due to CKD  7. Chronic Pain  8. Gastroparesis  9. Dysphagia    Admission Condition: fair    Discharged Condition: good    Indication for Admission:     Hospital Course:   Ms. Carmelita Robins was admitted with abdominal pain. Further evaluation showed that she is was in DKA. She was admitted into the ICU and started on IV insulin. Her gap closed and insulin was better controlled. Etiology of this recent DKA is unknown. She was bridged with Lantus and transferred to regular floor. She complained of dysphagia. Modified barium swallow was normal.  CT soft tissue of the neck was negative. Speech therapy was consulted and recommended weekly therapy with speech. She was noted to be hypotensive during hospitalization and her home BP meds were held while in the hospital.  At the time of discharge her blood pressure has started trending up in the 140/90. Some of her home medication will be resumed. However hydralazine was discontinued at the time of discharge. Time spent in discharge of this patient is greater than 30 minutes.     Consults: nephrology    Significant Diagnostic Studies: labs:     Treatments: IV hydration    Discharge Exam:  BP (!) 143/90   Pulse 93   Temp 98 °F (36.7 °C) (Oral)   Resp 16   Ht 5' 4\" (1.626 m)   Wt 154 lb 12.2 oz (70.2 kg)   SpO2 99%   BMI 26.57 kg/m²   General appearance: alert, appears stated age and cooperative  Head: Normocephalic, without obvious abnormality, atraumatic  Eyes: conjunctivae/corneas clear. PERRL, EOM's intact. Fundi benign. Ears: normal TM's and external ear canals both ears  Nose: Nares normal. Septum midline. Mucosa normal. No drainage or sinus tenderness. Throat: lips, mucosa, and tongue normal; teeth and gums normal  Neck: no adenopathy, no carotid bruit, no JVD, supple, symmetrical, trachea midline and thyroid not enlarged, symmetric, no tenderness/mass/nodules  Lungs: clear to auscultation bilaterally  Heart: regular rate and rhythm, S1, S2 normal, no murmur, click, rub or gallop  Abdomen: soft, non-tender; bowel sounds normal; no masses,  no organomegaly  Extremities: extremities normal, atraumatic, no cyanosis or edema  Pulses: 2+ and symmetric    Disposition: home    In process/preliminary results:  Outstanding Order Results     Date and Time Order Name Status Description    8/8/2021  7:34 AM PREPARE RBC (CROSSMATCH), 1 Units Preliminary     8/8/2021  3:22 AM Central Line Preliminary     7/10/2021  6:18 PM Hepatitis panel, acute Preliminary           Patient Instructions:   Current Discharge Medication List      CONTINUE these medications which have NOT CHANGED    Details   carvedilol (COREG) 6.25 MG tablet Take 1 tablet by mouth 2 times daily (with meals) Replaced Lopressor  Qty: 60 tablet, Refills: 0      pregabalin (LYRICA) 25 MG capsule Take 1 capsule by mouth daily for 30 days. Qty: 30 capsule, Refills: 0    Associated Diagnoses: Chronic neuropathic pain      insulin glargine (LANTUS) 100 UNIT/ML injection vial Inject 7 Units into the skin daily New change in dose and frequency  Qty: 1 vial, Refills: 3      sevelamer (RENVELA) 800 MG tablet Take 1 tablet by mouth 3 times daily (with meals) New lower dose  Qty: 90 tablet, Refills: 3      insulin lispro, 1 Unit Dial, (HUMALOG KWIKPEN) 100 UNIT/ML SOPN Inject 3 units with meals + sliding scale.  MAX 30U/day  Qty: 10 pen, Refills: 3    Associated Diagnoses: Type 1 diabetes mellitus with other specified complication (HCC)      Insulin Pen Needle (BD PEN NEEDLE NAV U/F) 32G X 4 MM MISC Uses with insulin 4 times a day  Qty: 250 each, Refills: 5    Associated Diagnoses: Type 1 diabetes mellitus with other specified complication ([facility] [address]      ! ! blood glucose monitor strips Freestyle Lite Strips. Checks 4 times/day before meals and at bedtime and as needed for symptoms of irregular blood glucose. Qty: 250 strip, Refills: 5    Associated Diagnoses: Type 1 diabetes mellitus with other specified complication (HCC)      mupirocin (BACTROBAN) 2 % ointment Apply 15 g topically daily Apply topically daily to left leg. folic acid (FOLVITE) 1 MG tablet Take 1 tablet by mouth daily  Qty: 30 tablet, Refills: 3      mirtazapine (REMERON) 15 MG tablet Take 15 mg by mouth nightly      hydrOXYzine (ATARAX) 25 MG tablet Take 25 mg by mouth daily      polyethylene glycol (GLYCOLAX) 17 g packet Take 17 g by mouth daily as needed for Constipation      albuterol (PROVENTIL) (2.5 MG/3ML) 0.083% nebulizer solution Take 2.5 mg by nebulization every 6 hours as needed for Wheezing      ARIPiprazole (ABILIFY) 5 MG tablet Take 5 mg by mouth nightly      Glucagon, rDNA, (GLUCAGON EMERGENCY IJ) Inject as directed      glucose (GLUTOSE) 40 % GEL Take 15 g by mouth See Admin Instructions      pantoprazole (PROTONIX) 40 MG tablet Take 1 tablet by mouth every morning (before breakfast)  Qty: 30 tablet, Refills: 3      levothyroxine (SYNTHROID) 75 MCG tablet TAKE 1 TABLET BY MOUTH IN THE MORNING BEFORE BREAKFAST  Qty: 60 tablet, Refills: 0      epoetin nena-epbx (RETACRIT) 3000 UNIT/ML SOLN injection Inject 1 mL into the skin three times a week  Qty: 21.9 mL      rOPINIRole (REQUIP) 0.25 MG tablet Take 0.25 mg by mouth nightly      !! blood glucose test strips (CONTOUR NEXT TEST) strip 1 each by In Vitro route 5 times daily As needed.   Qty: 150 each, Refills: 5    Associated Diagnoses: Type 1 diabetes mellitus with other specified complication ([facility] [address] Insulin pump in place      metoclopramide (REGLAN) 5 MG tablet Take 5 mg by mouth 3 times daily        ! ! - Potential duplicate medications found. Please discuss with provider. STOP taking these medications       hydrALAZINE (APRESOLINE) 50 MG tablet Comments:   Reason for Stopping:             Activity: activity as tolerated  Diet: renal diet  Wound Care: none needed    Follow-up with PCP in 4 weeks.     SignedVictorrebekah Monday Ofungu  8/11/2021  8:56 AM

## 2021-08-11 NOTE — PROGRESS NOTES
Department of Internal Medicine  Nephrology Progress Note    Events reviewed. SUBJECTIVE:  We are following Wilton Flores for ESRD on PD. Reports feeling better.      PHYSICAL EXAM:      Vitals:    VITALS:  BP (!) 110/51   Pulse 89   Temp 97.9 °F (36.6 °C) (Oral)   Resp 16   Ht 5' 4\" (1.626 m)   Wt 154 lb 12.2 oz (70.2 kg)   SpO2 99%   BMI 26.57 kg/m²   24HR BLOOD PRESSURE RANGE:  Systolic (89KLV), HCX:323 , Min:110 , HGS:335   ; Diastolic (28WPS), NVD:81, Min:51, Max:51      Intake/Output Summary (Last 24 hours) at 8/11/2021 4914  Last data filed at 8/11/2021 0736  Gross per 24 hour   Intake 480 ml   Output 1566 ml   Net -1086 ml       PD Catheter Exam:  PD catheter exit site clean    Constitutional: Awake in no acute distress  HEENT:  Normocephalic, PERRL  Respiratory: Decreased breath sounds on the basis  Cardiovascular/Edema:  RRR, S1/S2  Gastrointestinal:  Soft, PD catheter exit site clean   Neurologic:  Nonfocal, AVELAR  Skin:  Warm, dry, no lesions  Other:  no edema    Scheduled Meds:   potassium chloride  20 mEq Oral Daily    insulin glargine  7 Units Subcutaneous Daily    epoetin nena-epbx  3,000 Units Subcutaneous Once per day on Mon Wed Fri    gentamicin   Topical Daily    ARIPiprazole  5 mg Oral Nightly    folic acid  1 mg Oral Daily    levothyroxine  75 mcg Oral Daily    metoclopramide  5 mg Oral TID    mirtazapine  15 mg Oral Nightly    pregabalin  25 mg Oral Daily    rOPINIRole  0.25 mg Oral Nightly    sevelamer  800 mg Oral TID WC    pantoprazole  40 mg Intravenous BID    And    sodium chloride (PF)  10 mL Intravenous BID    insulin lispro  3 Units Subcutaneous TID WC    insulin lispro  0-6 Units Subcutaneous TID WC    insulin lispro  0-3 Units Subcutaneous Nightly    heparin (porcine)  5,000 Units Subcutaneous 3 times per day     Continuous Infusions:   sodium chloride      dextrose       PRN Meds:.albuterol, HYDROmorphone, sodium chloride, glucose, dextrose, glucagon (rDNA), dextrose, polyethylene glycol    DATA:    CBC:   Lab Results   Component Value Date    WBC 12.2 08/08/2021    RBC 2.09 08/08/2021    HGB 8.3 08/08/2021    HCT 24.4 08/08/2021    MCV 94.3 08/08/2021    MCH 31.1 08/08/2021    MCHC 33.0 08/08/2021    RDW 14.8 08/08/2021     08/08/2021    MPV 10.5 08/08/2021     CMP:    Lab Results   Component Value Date     08/10/2021    K 3.3 08/10/2021    K 4.9 08/07/2021     08/10/2021    CO2 25 08/10/2021    BUN 37 08/10/2021    CREATININE 7.6 08/10/2021    GFRAA 8 08/10/2021    LABGLOM 8 08/10/2021    GLUCOSE 208 08/10/2021    GLUCOSE 130 05/18/2012    PROT 5.3 08/07/2021    LABALBU 2.4 08/07/2021    LABALBU 4.1 05/18/2012    CALCIUM 7.6 08/10/2021    BILITOT <0.2 08/07/2021    ALKPHOS 230 08/07/2021    AST 13 08/07/2021    ALT 23 08/07/2021     Magnesium:    Lab Results   Component Value Date    MG 1.7 08/10/2021     Phosphorus:    Lab Results   Component Value Date    PHOS 4.3 08/10/2021       Radiology Review:      NM Gastric Emptying 7/13/2021   Severely delayed gastric emptying.         Vitals swallowing August 9, 2021   Swallowing mechanism grossly within normal limits without evidence of   aspiration.       Please see separate speech pathology report for full discussion of findings   and recommendations.           CXR 8/8/2021   Left internal jugular central line tip projects over the right atrium.  No   evidence of pneumothorax.             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  In the emergency room she was found to have a glucose level of 1341, beta hydroxybutyrate of >4.5  She was admitted to MICU. IMPRESSION/RECOMMENDATIONS:      1. ESRD on peritoneal dialysis, to continue CCPD   2. Hypokalemia, start potassium 20 mEq po daily  3. HTN, holding BP medications   4. MBD of CKD, on sevelamer  5. Anemia of CKD, to start epoetin alpha 3000 units 3 times a week  6. Severe gastroparesis, on metoclopramide  7.  Nutrition, diabetic diet     Plan:     · Awaiting AM labs  · Continue CCPD 4 exchanges over 10 hours with long dwell of 2 L all exchanges and 2.5%   · Start potassium 20 mEq po daily  · Continue to monitor electrolytes    · Epoetin alpha 3000 units 3 times a week  · Glucose control         Electronically signed by HEBER Youssef CNP on 8/11/2021 at 8:11 AM

## 2021-08-12 ENCOUNTER — TELEPHONE (OUTPATIENT)
Dept: INTERNAL MEDICINE | Age: 31
End: 2021-08-12

## 2021-08-16 ENCOUNTER — APPOINTMENT (OUTPATIENT)
Dept: ULTRASOUND IMAGING | Age: 31
End: 2021-08-16
Payer: COMMERCIAL

## 2021-08-16 ENCOUNTER — HOSPITAL ENCOUNTER (EMERGENCY)
Age: 31
Discharge: HOME | End: 2021-08-16
Attending: STUDENT IN AN ORGANIZED HEALTH CARE EDUCATION/TRAINING PROGRAM
Payer: COMMERCIAL

## 2021-08-16 ENCOUNTER — APPOINTMENT (OUTPATIENT)
Dept: GENERAL RADIOLOGY | Age: 31
End: 2021-08-16
Payer: COMMERCIAL

## 2021-08-16 VITALS
BODY MASS INDEX: 26.12 KG/M2 | HEIGHT: 64 IN | OXYGEN SATURATION: 97 % | DIASTOLIC BLOOD PRESSURE: 90 MMHG | SYSTOLIC BLOOD PRESSURE: 189 MMHG | HEART RATE: 97 BPM | TEMPERATURE: 97.3 F | WEIGHT: 153 LBS | RESPIRATION RATE: 18 BRPM

## 2021-08-16 DIAGNOSIS — L03.116 CELLULITIS OF LEFT LOWER EXTREMITY: Primary | ICD-10-CM

## 2021-08-16 LAB
ANION GAP SERPL CALCULATED.3IONS-SCNC: 11 MMOL/L (ref 7–16)
BASOPHILS ABSOLUTE: 0.13 E9/L (ref 0–0.2)
BASOPHILS RELATIVE PERCENT: 1.9 % (ref 0–2)
BUN BLDV-MCNC: 40 MG/DL (ref 6–20)
C-REACTIVE PROTEIN: 0.9 MG/DL (ref 0–0.4)
CALCIUM SERPL-MCNC: 8.3 MG/DL (ref 8.6–10.2)
CHLORIDE BLD-SCNC: 95 MMOL/L (ref 98–107)
CHP ED QC CHECK: YES
CO2: 26 MMOL/L (ref 22–29)
CREAT SERPL-MCNC: 6.3 MG/DL (ref 0.5–1)
EOSINOPHILS ABSOLUTE: 0.25 E9/L (ref 0.05–0.5)
EOSINOPHILS RELATIVE PERCENT: 3.7 % (ref 0–6)
GFR AFRICAN AMERICAN: 9
GFR NON-AFRICAN AMERICAN: 9 ML/MIN/1.73
GLUCOSE BLD-MCNC: 308 MG/DL
GLUCOSE BLD-MCNC: 361 MG/DL (ref 74–99)
HCG QUALITATIVE: NEGATIVE
HCT VFR BLD CALC: 31.6 % (ref 34–48)
HEMOGLOBIN: 10.1 G/DL (ref 11.5–15.5)
IMMATURE GRANULOCYTES #: 0.03 E9/L
IMMATURE GRANULOCYTES %: 0.4 % (ref 0–5)
LYMPHOCYTES ABSOLUTE: 2.11 E9/L (ref 1.5–4)
LYMPHOCYTES RELATIVE PERCENT: 31.5 % (ref 20–42)
MCH RBC QN AUTO: 30.7 PG (ref 26–35)
MCHC RBC AUTO-ENTMCNC: 32 % (ref 32–34.5)
MCV RBC AUTO: 96 FL (ref 80–99.9)
METER GLUCOSE: 308 MG/DL (ref 74–99)
MONOCYTES ABSOLUTE: 0.48 E9/L (ref 0.1–0.95)
MONOCYTES RELATIVE PERCENT: 7.2 % (ref 2–12)
NEUTROPHILS ABSOLUTE: 3.69 E9/L (ref 1.8–7.3)
NEUTROPHILS RELATIVE PERCENT: 55.3 % (ref 43–80)
PDW BLD-RTO: 15.3 FL (ref 11.5–15)
PLATELET # BLD: 332 E9/L (ref 130–450)
PMV BLD AUTO: 10.2 FL (ref 7–12)
POTASSIUM REFLEX MAGNESIUM: 4.2 MMOL/L (ref 3.5–5)
RBC # BLD: 3.29 E12/L (ref 3.5–5.5)
SEDIMENTATION RATE, ERYTHROCYTE: 31 MM/HR (ref 0–20)
SODIUM BLD-SCNC: 132 MMOL/L (ref 132–146)
WBC # BLD: 6.7 E9/L (ref 4.5–11.5)

## 2021-08-16 PROCEDURE — 82962 GLUCOSE BLOOD TEST: CPT

## 2021-08-16 PROCEDURE — 86140 C-REACTIVE PROTEIN: CPT

## 2021-08-16 PROCEDURE — 93971 EXTREMITY STUDY: CPT

## 2021-08-16 PROCEDURE — 80048 BASIC METABOLIC PNL TOTAL CA: CPT

## 2021-08-16 PROCEDURE — 96374 THER/PROPH/DIAG INJ IV PUSH: CPT

## 2021-08-16 PROCEDURE — 99285 EMERGENCY DEPT VISIT HI MDM: CPT

## 2021-08-16 PROCEDURE — 6360000002 HC RX W HCPCS: Performed by: GENERAL PRACTICE

## 2021-08-16 PROCEDURE — 84703 CHORIONIC GONADOTROPIN ASSAY: CPT

## 2021-08-16 PROCEDURE — 85651 RBC SED RATE NONAUTOMATED: CPT

## 2021-08-16 PROCEDURE — 73590 X-RAY EXAM OF LOWER LEG: CPT

## 2021-08-16 PROCEDURE — 85025 COMPLETE CBC W/AUTO DIFF WBC: CPT

## 2021-08-16 RX ORDER — DOXYCYCLINE HYCLATE 100 MG
100 TABLET ORAL 2 TIMES DAILY
Qty: 14 TABLET | Refills: 0 | Status: SHIPPED | OUTPATIENT
Start: 2021-08-16 | End: 2021-08-23

## 2021-08-16 RX ORDER — SODIUM CHLORIDE 0.9 % (FLUSH) 0.9 %
SYRINGE (ML) INJECTION
Status: DISCONTINUED
Start: 2021-08-16 | End: 2021-08-16 | Stop reason: HOSPADM

## 2021-08-16 RX ORDER — FENTANYL CITRATE 50 UG/ML
50 INJECTION, SOLUTION INTRAMUSCULAR; INTRAVENOUS ONCE
Status: COMPLETED | OUTPATIENT
Start: 2021-08-16 | End: 2021-08-16

## 2021-08-16 RX ADMIN — FENTANYL CITRATE 50 MCG: 50 INJECTION, SOLUTION INTRAMUSCULAR; INTRAVENOUS at 13:45

## 2021-08-16 ASSESSMENT — PAIN DESCRIPTION - LOCATION: LOCATION: LEG

## 2021-08-16 ASSESSMENT — ENCOUNTER SYMPTOMS
COUGH: 0
NAUSEA: 0
VOMITING: 0
SHORTNESS OF BREATH: 0
CHEST TIGHTNESS: 0
CHOKING: 0
WHEEZING: 0
SORE THROAT: 0
EYE PAIN: 0
SINUS PAIN: 0
DIARRHEA: 0
ABDOMINAL PAIN: 0

## 2021-08-16 ASSESSMENT — PAIN DESCRIPTION - FREQUENCY: FREQUENCY: CONTINUOUS

## 2021-08-16 ASSESSMENT — PAIN DESCRIPTION - ORIENTATION
ORIENTATION: LEFT;LOWER
ORIENTATION: LEFT;LOWER

## 2021-08-16 ASSESSMENT — PAIN SCALES - GENERAL
PAINLEVEL_OUTOF10: 8

## 2021-08-16 ASSESSMENT — PAIN DESCRIPTION - ONSET: ONSET: ON-GOING

## 2021-08-16 ASSESSMENT — PAIN DESCRIPTION - DESCRIPTORS
DESCRIPTORS: DISCOMFORT;TENDER;PRESSURE
DESCRIPTORS: SORE

## 2021-08-16 ASSESSMENT — PAIN DESCRIPTION - PAIN TYPE: TYPE: ACUTE PAIN

## 2021-08-16 ASSESSMENT — PAIN DESCRIPTION - PROGRESSION: CLINICAL_PROGRESSION: NOT CHANGED

## 2021-08-16 NOTE — ED PROVIDER NOTES
ED  Provider Note  Admit Date/RoomTime: 8/16/2021 12:02 PM  ED Room: [place] [id number]     HPI:   Karen Giron is a 36 y.o. female presenting to the ED for leg pain, beginning several days ago. History comes primarily from the patient. Past medical history includes risk comorbidities including but not limited to ESRD on dialysis, type 1 diabetes with recent admission for DKA, MTHFR mutation, endocarditis, seizures. The complaint has been persistent, moderate in severity, improved by nothing and worsened by nothing. Associated symptoms include swelling. Several months ago area went into cardiac arrest, and required an intraosseous line in her left lower extremity for access during the cardiac arrest.  She has developed a lot of tissue breakdown from the area since having that IO line, and has had 2 surgeries in the area for skin grafting. She states in the last 2 to 3 days the area has become more warm, tender and swollen. She states that this swelling and tenderness has made it difficult for her to ambulate on this leg. She denies any redness or drainage to the area, however the swelling is significantly worse than her findings on the other leg, and she became concerned and arrived at [facility] emergency department for further evaluation and treatment. On arrival, the patient was assessed with history, physical exam, imaging studies and laboratory studies, vital signs. Vital signs were stable on arrival and the patient was afebrile. Review of Systems   Constitutional: Positive for activity change. Negative for appetite change, chills and fever. HENT: Negative for sinus pain and sore throat. Eyes: Negative for pain and visual disturbance. Respiratory: Negative for cough, choking, chest tightness, shortness of breath and wheezing. Cardiovascular: Negative for chest pain and palpitations. Gastrointestinal: Negative for abdominal pain, diarrhea, nausea and vomiting. Genitourinary: Negative for hematuria. Musculoskeletal: Positive for arthralgias, gait problem and myalgias. Negative for neck pain and neck stiffness. Skin: Positive for wound. Negative for rash. Neurological: Negative for tremors, syncope and weakness. Psychiatric/Behavioral: Negative for confusion. Physical Exam  Vitals and nursing note reviewed. Constitutional:       General: She is not in acute distress. Appearance: She is well-developed. She is not ill-appearing. HENT:      Head: Normocephalic and atraumatic. Eyes:      Pupils: Pupils are equal, round, and reactive to light. Cardiovascular:      Rate and Rhythm: Normal rate and regular rhythm. Pulmonary:      Effort: Pulmonary effort is normal. No respiratory distress. Breath sounds: Normal breath sounds. No wheezing or rales. Abdominal:      General: Bowel sounds are normal.      Palpations: Abdomen is soft. Tenderness: There is no abdominal tenderness. There is no guarding or rebound. Musculoskeletal:      Cervical back: Normal range of motion and neck supple. Skin:     General: Skin is warm and dry. Capillary Refill: Capillary refill takes less than 2 seconds. Comments: Old, scarred down tissue noted over the anterior surface of the left tibia. Tissue surrounding it is edematous and pitted. Pain with passive flexion is noted in that foot. Dorsalis pedis and posterior tibialis pulses are appreciable   Neurological:      General: No focal deficit present. Mental Status: She is alert and oriented to person, place, and time. Cranial Nerves: No cranial nerve deficit. Coordination: Coordination normal.          Procedures     MDM  Number of Diagnoses or Management Options  Cellulitis of left lower extremity  Diagnosis management comments: Emergency department evaluation was notable for findings consistent with lower extremity cellulitis.   Patient's work-up was negative for acute osseous process or free air on x-ray, there was no evidence of DVT on the patient's ultrasound, patient had a mildly elevated sedimentation rate that was improved as compared to previous studies, no evidence of DKA, no leukocytosis, anemia or thrombocytopenia and generally balanced electrolytes. Patient will be prescribed an outpatient antibiotic and advised to follow-up with her primary care provider. Vital signs remained stable during her emergency department stay and she was considered stable for discharge to home with outpatient follow-up.              --------------------------------------------- PAST HISTORY ---------------------------------------------  Past Medical History:  has a past medical history of Acute congestive heart failure ([address] BC (acute kidney injury) ([facility] [address] Cardiac arrest ([facility] [address] Cephalgia, Chronic kidney disease, Depression, Diabetes mellitus ([address] Diabetic gastroparesis associated with type 1 diabetes mellitus ([address] Diabetic ketoacidosis ([address] Diabetic ketoacidosis with coma associated with type 1 diabetes mellitus ([address] Diabetic polyneuropathy associated with type 1 diabetes mellitus ([address] Drug use complicating pregnancy in third trimester, Endocarditis, ESRD (end stage renal disease) ([address] Hemodialysis patient ([address] History of blood transfusion, Hyperlipidemia, Hyperosmolar hyperglycemic state (HHS) ([address] Hypothyroidism, Iron deficiency anemia, MDRO (multiple drug resistant organisms) resistance, MRSA (methicillin resistant Staphylococcus aureus), Non compliance w medication regimen, Other disorders of kidney and ureter, Pregnancy, Previous  delivery affecting pregnancy, antepartum, Previous stillbirth or demise, antepartum, Seizure ([address] Severe pre-eclampsia in third trimester, Shock liver, and Valvular endocarditis. Past Surgical History:  has a past surgical history that includes ECHO Compl W Dop Color Flow (2012); ECHO Compl W Dop Color Flow (6/10/2013);   3. 69 1.80 - 7.30 E9/L    Immature Granulocytes # 0.03 E9/L    Lymphocytes Absolute 2.11 1.50 - 4.00 E9/L    Monocytes Absolute 0.48 0.10 - 0.95 E9/L    Eosinophils Absolute 0.25 0.05 - 0.50 E9/L    Basophils Absolute 0.13 0.00 - 0.20 E9/L   Sedimentation Rate   Result Value Ref Range    Sed Rate 31 (H) 0 - 20 mm/Hr   C-reactive protein   Result Value Ref Range    CRP 0.9 (H) 0.0 - 0.4 mg/dL   Basic Metabolic Panel w/ Reflex to MG   Result Value Ref Range    Sodium 132 132 - 146 mmol/L    Potassium reflex Magnesium 4.2 3.5 - 5.0 mmol/L    Chloride 95 (L) 98 - 107 mmol/L    CO2 26 22 - 29 mmol/L    Anion Gap 11 7 - 16 mmol/L    Glucose 361 (H) 74 - 99 mg/dL    BUN 40 (H) 6 - 20 mg/dL    CREATININE 6.3 (H) 0.5 - 1.0 mg/dL    GFR Non-African American 9 >=60 mL/min/1.73    GFR African American 9     Calcium 8.3 (L) 8.6 - 10.2 mg/dL   HCG Qualitative, Serum   Result Value Ref Range    hCG Qual NEGATIVE NEGATIVE   POCT Glucose   Result Value Ref Range    Glucose 308 mg/dL    QC OK? YES    POCT Glucose   Result Value Ref Range    Meter Glucose 308 (H) 74 - 99 mg/dL       Radiology:  XR TIBIA FIBULA LEFT (2 VIEWS)   Final Result   No acute osseous abnormality         US DUP LOWER EXTREMITY LEFT RODO   Final Result   No evidence of DVT in the left lower extremity.             ------------------------- NURSING NOTES AND VITALS REVIEWED ---------------------------  Date / Time Roomed:  8/16/2021 12:02 PM  ED Bed Assignment:  [facility]/    The nursing notes within the ED encounter and vital signs as below have been reviewed.    BP (!) 189/90   Pulse 97   Temp 97.3 °F (36.3 °C)   Resp 18   Ht 5' 4\" (1.626 m)   Wt 153 lb (69.4 kg)   LMP  (Approximate)   SpO2 97%   BMI 26.26 kg/m²   Oxygen Saturation Interpretation: Normal      ------------------------------------------ PROGRESS NOTES ------------------------------------------  4:45 PM EDT  I have spoken with the patient and discussed todays results, in addition to providing specific details for the plan of care and counseling regarding the diagnosis and prognosis. Their questions are answered at this time and they are agreeable with the plan. I discussed at length with them reasons for immediate return here for re evaluation. They will followup with their primary care physician by calling their office tomorrow. --------------------------------- ADDITIONAL PROVIDER NOTES ---------------------------------    At this time the patient is without objective evidence of an acute process requiring hospitalization or inpatient management. They have remained hemodynamically stable throughout their entire ED visit and are stable for discharge with outpatient follow-up. The plan has been discussed in detail and they are aware of the specific conditions for emergent return, as well as the importance of follow-up. Discharge Medication List as of 8/16/2021  4:02 PM      START taking these medications    Details   doxycycline hyclate (VIBRA-TABS) 100 MG tablet Take 1 tablet by mouth 2 times daily for 7 days, Disp-14 tablet, R-0Print             Diagnosis:  1. Cellulitis of left lower extremity        Disposition:  Patient's disposition: Discharge to home  Patient's condition is stable.             Ben Zapien [address] DO  Resident  08/16/21 0049

## 2021-08-25 ENCOUNTER — APPOINTMENT (OUTPATIENT)
Dept: ULTRASOUND IMAGING | Age: 31
DRG: 203 | End: 2021-08-25
Payer: COMMERCIAL

## 2021-08-25 ENCOUNTER — APPOINTMENT (OUTPATIENT)
Dept: CT IMAGING | Age: 31
DRG: 203 | End: 2021-08-25
Payer: COMMERCIAL

## 2021-08-25 ENCOUNTER — APPOINTMENT (OUTPATIENT)
Dept: GENERAL RADIOLOGY | Age: 31
DRG: 203 | End: 2021-08-25
Payer: COMMERCIAL

## 2021-08-25 ENCOUNTER — HOSPITAL ENCOUNTER (INPATIENT)
Age: 31
LOS: 2 days | Discharge: HOME | DRG: 203 | End: 2021-08-27
Attending: EMERGENCY MEDICINE | Admitting: INTERNAL MEDICINE
Payer: COMMERCIAL

## 2021-08-25 DIAGNOSIS — I50.9 ACUTE ON CHRONIC CONGESTIVE HEART FAILURE, UNSPECIFIED HEART FAILURE TYPE (HCC): ICD-10-CM

## 2021-08-25 DIAGNOSIS — N18.6 ESRD (END STAGE RENAL DISEASE) (HCC): ICD-10-CM

## 2021-08-25 DIAGNOSIS — E87.79 OTHER HYPERVOLEMIA: ICD-10-CM

## 2021-08-25 DIAGNOSIS — R07.9 CHEST PAIN, UNSPECIFIED TYPE: Primary | ICD-10-CM

## 2021-08-25 DIAGNOSIS — E87.6 HYPOKALEMIA: ICD-10-CM

## 2021-08-25 DIAGNOSIS — R77.8 ELEVATED TROPONIN: ICD-10-CM

## 2021-08-25 PROBLEM — I50.23 ACUTE ON CHRONIC SYSTOLIC CHF (CONGESTIVE HEART FAILURE) (HCC): Status: ACTIVE | Noted: 2021-08-25

## 2021-08-25 LAB
ALBUMIN SERPL-MCNC: 2.4 G/DL (ref 3.5–5.2)
ALP BLD-CCNC: 257 U/L (ref 35–104)
ALT SERPL-CCNC: 18 U/L (ref 0–32)
ANION GAP SERPL CALCULATED.3IONS-SCNC: 15 MMOL/L (ref 7–16)
APTT: 26.3 SEC (ref 24.5–35.1)
AST SERPL-CCNC: 32 U/L (ref 0–31)
BASOPHILS ABSOLUTE: 0.16 E9/L (ref 0–0.2)
BASOPHILS RELATIVE PERCENT: 1.7 % (ref 0–2)
BILIRUB SERPL-MCNC: <0.2 MG/DL (ref 0–1.2)
BUN BLDV-MCNC: 25 MG/DL (ref 6–20)
CALCIUM SERPL-MCNC: 7.8 MG/DL (ref 8.6–10.2)
CHLORIDE BLD-SCNC: 96 MMOL/L (ref 98–107)
CHP ED QC CHECK: YES
CO2: 27 MMOL/L (ref 22–29)
CREAT SERPL-MCNC: 7.2 MG/DL (ref 0.5–1)
EKG ATRIAL RATE: 94 BPM
EKG P AXIS: 41 DEGREES
EKG P-R INTERVAL: 118 MS
EKG Q-T INTERVAL: 436 MS
EKG QRS DURATION: 84 MS
EKG QTC CALCULATION (BAZETT): 545 MS
EKG R AXIS: 30 DEGREES
EKG T AXIS: 61 DEGREES
EKG VENTRICULAR RATE: 94 BPM
EOSINOPHILS ABSOLUTE: 0.31 E9/L (ref 0.05–0.5)
EOSINOPHILS RELATIVE PERCENT: 3.3 % (ref 0–6)
GFR AFRICAN AMERICAN: 8
GFR NON-AFRICAN AMERICAN: 8 ML/MIN/1.73
GLUCOSE BLD-MCNC: 177 MG/DL
GLUCOSE BLD-MCNC: 195 MG/DL (ref 74–99)
HCG QUALITATIVE: NEGATIVE
HCT VFR BLD CALC: 33.7 % (ref 34–48)
HEMOGLOBIN: 11.2 G/DL (ref 11.5–15.5)
IMMATURE GRANULOCYTES #: 0.03 E9/L
IMMATURE GRANULOCYTES %: 0.3 % (ref 0–5)
LYMPHOCYTES ABSOLUTE: 1.73 E9/L (ref 1.5–4)
LYMPHOCYTES RELATIVE PERCENT: 18.2 % (ref 20–42)
MAGNESIUM: 1.7 MG/DL (ref 1.6–2.6)
MCH RBC QN AUTO: 31.3 PG (ref 26–35)
MCHC RBC AUTO-ENTMCNC: 33.2 % (ref 32–34.5)
MCV RBC AUTO: 94.1 FL (ref 80–99.9)
METER GLUCOSE: 177 MG/DL (ref 74–99)
MONOCYTES ABSOLUTE: 0.32 E9/L (ref 0.1–0.95)
MONOCYTES RELATIVE PERCENT: 3.4 % (ref 2–12)
NEUTROPHILS ABSOLUTE: 6.96 E9/L (ref 1.8–7.3)
NEUTROPHILS RELATIVE PERCENT: 73.1 % (ref 43–80)
PDW BLD-RTO: 15.7 FL (ref 11.5–15)
PLATELET # BLD: 294 E9/L (ref 130–450)
PMV BLD AUTO: 10.5 FL (ref 7–12)
POTASSIUM REFLEX MAGNESIUM: 2.9 MMOL/L (ref 3.5–5)
PRO-BNP: ABNORMAL PG/ML (ref 0–125)
RBC # BLD: 3.58 E12/L (ref 3.5–5.5)
SODIUM BLD-SCNC: 138 MMOL/L (ref 132–146)
TOTAL PROTEIN: 5.3 G/DL (ref 6.4–8.3)
TROPONIN, HIGH SENSITIVITY: 200 NG/L (ref 0–9)
TROPONIN, HIGH SENSITIVITY: 202 NG/L (ref 0–9)
WBC # BLD: 9.5 E9/L (ref 4.5–11.5)

## 2021-08-25 PROCEDURE — 84703 CHORIONIC GONADOTROPIN ASSAY: CPT

## 2021-08-25 PROCEDURE — 71250 CT THORAX DX C-: CPT

## 2021-08-25 PROCEDURE — 71275 CT ANGIOGRAPHY CHEST: CPT

## 2021-08-25 PROCEDURE — 6360000002 HC RX W HCPCS: Performed by: EMERGENCY MEDICINE

## 2021-08-25 PROCEDURE — 71045 X-RAY EXAM CHEST 1 VIEW: CPT

## 2021-08-25 PROCEDURE — 96372 THER/PROPH/DIAG INJ SC/IM: CPT

## 2021-08-25 PROCEDURE — 99223 1ST HOSP IP/OBS HIGH 75: CPT | Performed by: INTERNAL MEDICINE

## 2021-08-25 PROCEDURE — 6370000000 HC RX 637 (ALT 250 FOR IP): Performed by: EMERGENCY MEDICINE

## 2021-08-25 PROCEDURE — 6360000002 HC RX W HCPCS: Performed by: STUDENT IN AN ORGANIZED HEALTH CARE EDUCATION/TRAINING PROGRAM

## 2021-08-25 PROCEDURE — 82962 GLUCOSE BLOOD TEST: CPT

## 2021-08-25 PROCEDURE — 36556 INSERT NON-TUNNEL CV CATH: CPT

## 2021-08-25 PROCEDURE — 85730 THROMBOPLASTIN TIME PARTIAL: CPT

## 2021-08-25 PROCEDURE — 96375 TX/PRO/DX INJ NEW DRUG ADDON: CPT

## 2021-08-25 PROCEDURE — 02HV33Z INSERTION OF INFUSION DEVICE INTO SUPERIOR VENA CAVA, PERCUTANEOUS APPROACH: ICD-10-PCS | Performed by: INTERNAL MEDICINE

## 2021-08-25 PROCEDURE — 93005 ELECTROCARDIOGRAM TRACING: CPT | Performed by: EMERGENCY MEDICINE

## 2021-08-25 PROCEDURE — 93971 EXTREMITY STUDY: CPT

## 2021-08-25 PROCEDURE — 83880 ASSAY OF NATRIURETIC PEPTIDE: CPT

## 2021-08-25 PROCEDURE — 96365 THER/PROPH/DIAG IV INF INIT: CPT

## 2021-08-25 PROCEDURE — 6360000004 HC RX CONTRAST MEDICATION: Performed by: RADIOLOGY

## 2021-08-25 PROCEDURE — 83735 ASSAY OF MAGNESIUM: CPT

## 2021-08-25 PROCEDURE — 99285 EMERGENCY DEPT VISIT HI MDM: CPT

## 2021-08-25 PROCEDURE — 96361 HYDRATE IV INFUSION ADD-ON: CPT

## 2021-08-25 PROCEDURE — 2580000003 HC RX 258: Performed by: EMERGENCY MEDICINE

## 2021-08-25 PROCEDURE — 96360 HYDRATION IV INFUSION INIT: CPT

## 2021-08-25 PROCEDURE — 85025 COMPLETE CBC W/AUTO DIFF WBC: CPT

## 2021-08-25 PROCEDURE — 80053 COMPREHEN METABOLIC PANEL: CPT

## 2021-08-25 PROCEDURE — 2060000000 HC ICU INTERMEDIATE R&B

## 2021-08-25 PROCEDURE — 84484 ASSAY OF TROPONIN QUANT: CPT

## 2021-08-25 PROCEDURE — 96366 THER/PROPH/DIAG IV INF ADDON: CPT

## 2021-08-25 RX ORDER — HEPARIN SODIUM 10000 [USP'U]/100ML
5-30 INJECTION, SOLUTION INTRAVENOUS CONTINUOUS
Status: DISCONTINUED | OUTPATIENT
Start: 2021-08-25 | End: 2021-08-25

## 2021-08-25 RX ORDER — SODIUM CHLORIDE 0.9 % (FLUSH) 0.9 %
SYRINGE (ML) INJECTION
Status: DISPENSED
Start: 2021-08-25 | End: 2021-08-25

## 2021-08-25 RX ORDER — MORPHINE SULFATE 4 MG/ML
4 INJECTION, SOLUTION INTRAMUSCULAR; INTRAVENOUS ONCE
Status: DISCONTINUED | OUTPATIENT
Start: 2021-08-25 | End: 2021-08-25

## 2021-08-25 RX ORDER — HYDRALAZINE HYDROCHLORIDE 25 MG/1
25 TABLET, FILM COATED ORAL ONCE
Status: COMPLETED | OUTPATIENT
Start: 2021-08-25 | End: 2021-08-25

## 2021-08-25 RX ORDER — HEPARIN SODIUM 1000 [USP'U]/ML
30 INJECTION, SOLUTION INTRAVENOUS; SUBCUTANEOUS PRN
Status: DISCONTINUED | OUTPATIENT
Start: 2021-08-25 | End: 2021-08-25

## 2021-08-25 RX ORDER — ASPIRIN 81 MG/1
324 TABLET, CHEWABLE ORAL ONCE
Status: COMPLETED | OUTPATIENT
Start: 2021-08-25 | End: 2021-08-25

## 2021-08-25 RX ORDER — SODIUM CHLORIDE 9 MG/ML
INJECTION, SOLUTION INTRAVENOUS
Status: DISPENSED
Start: 2021-08-25 | End: 2021-08-25

## 2021-08-25 RX ORDER — HEPARIN SODIUM 1000 [USP'U]/ML
60 INJECTION, SOLUTION INTRAVENOUS; SUBCUTANEOUS ONCE
Status: COMPLETED | OUTPATIENT
Start: 2021-08-25 | End: 2021-08-25

## 2021-08-25 RX ORDER — NITROGLYCERIN 0.4 MG/1
0.4 TABLET SUBLINGUAL ONCE
Status: COMPLETED | OUTPATIENT
Start: 2021-08-25 | End: 2021-08-25

## 2021-08-25 RX ORDER — 0.9 % SODIUM CHLORIDE 0.9 %
500 INTRAVENOUS SOLUTION INTRAVENOUS ONCE
Status: COMPLETED | OUTPATIENT
Start: 2021-08-25 | End: 2021-08-25

## 2021-08-25 RX ORDER — MORPHINE SULFATE 4 MG/ML
4 INJECTION, SOLUTION INTRAMUSCULAR; INTRAVENOUS ONCE
Status: COMPLETED | OUTPATIENT
Start: 2021-08-25 | End: 2021-08-25

## 2021-08-25 RX ORDER — HEPARIN SODIUM 1000 [USP'U]/ML
60 INJECTION, SOLUTION INTRAVENOUS; SUBCUTANEOUS PRN
Status: DISCONTINUED | OUTPATIENT
Start: 2021-08-25 | End: 2021-08-25

## 2021-08-25 RX ORDER — LABETALOL HYDROCHLORIDE 5 MG/ML
10 INJECTION, SOLUTION INTRAVENOUS ONCE
Status: DISCONTINUED | OUTPATIENT
Start: 2021-08-25 | End: 2021-08-25

## 2021-08-25 RX ORDER — POTASSIUM CHLORIDE 20 MEQ/1
40 TABLET, EXTENDED RELEASE ORAL ONCE
Status: COMPLETED | OUTPATIENT
Start: 2021-08-25 | End: 2021-08-25

## 2021-08-25 RX ADMIN — HEPARIN SODIUM 3560 UNITS: 1000 INJECTION INTRAVENOUS; SUBCUTANEOUS at 18:03

## 2021-08-25 RX ADMIN — MORPHINE SULFATE 4 MG: 4 INJECTION, SOLUTION INTRAMUSCULAR; INTRAVENOUS at 18:00

## 2021-08-25 RX ADMIN — IOPAMIDOL 75 ML: 755 INJECTION, SOLUTION INTRAVENOUS at 18:28

## 2021-08-25 RX ADMIN — NITROGLYCERIN 0.4 MG: 0.4 TABLET, ORALLY DISINTEGRATING SUBLINGUAL at 13:03

## 2021-08-25 RX ADMIN — POTASSIUM CHLORIDE 40 MEQ: 1500 TABLET, EXTENDED RELEASE ORAL at 12:46

## 2021-08-25 RX ADMIN — HEPARIN SODIUM 12 UNITS/KG/HR: 10000 INJECTION, SOLUTION INTRAVENOUS at 18:03

## 2021-08-25 RX ADMIN — MORPHINE SULFATE 4 MG: 4 INJECTION, SOLUTION INTRAMUSCULAR; INTRAVENOUS at 16:42

## 2021-08-25 RX ADMIN — ASPIRIN 324 MG: 81 TABLET, CHEWABLE ORAL at 11:05

## 2021-08-25 RX ADMIN — NITROGLYCERIN 0.5 INCH: 20 OINTMENT TOPICAL at 15:46

## 2021-08-25 RX ADMIN — HYDRALAZINE HYDROCHLORIDE 25 MG: 25 TABLET, FILM COATED ORAL at 17:58

## 2021-08-25 RX ADMIN — SODIUM CHLORIDE 500 ML: 9 INJECTION, SOLUTION INTRAVENOUS at 11:05

## 2021-08-25 RX ADMIN — NITROGLYCERIN 0.4 MG: 0.4 TABLET, ORALLY DISINTEGRATING SUBLINGUAL at 15:45

## 2021-08-25 ASSESSMENT — PAIN SCALES - GENERAL
PAINLEVEL_OUTOF10: 8
PAINLEVEL_OUTOF10: 7
PAINLEVEL_OUTOF10: 8
PAINLEVEL_OUTOF10: 8

## 2021-08-25 ASSESSMENT — ENCOUNTER SYMPTOMS
EYE PAIN: 0
BACK PAIN: 0
VOMITING: 0
COUGH: 0
NAUSEA: 0
WHEEZING: 0
SHORTNESS OF BREATH: 1
DIARRHEA: 0
ABDOMINAL PAIN: 0
SORE THROAT: 0

## 2021-08-25 NOTE — ED PROVIDER NOTES
Patient is a 36 y.o. female with a past medical history of MTHFR mutation, hypertension, end-stage renal disease on peritoneal dialysis, diabetes, hyperlipidemia, endocarditis, seizures, presenting to the Emergency Department for chest pain. History obtained by patient. Symptoms are moderate in severity and constant since onset. They are improved by nothing and worsened by nothing. No recent car trips or long travel. Not on anticoagulation. Has swelling to the bilateral extremities but states the left leg is more swollen than usual.    Chest Pain  Pain location:  L chest  Pain quality: pressure    Pain radiates to:  Neck, L jaw and L arm  Pain severity:  Moderate  Onset quality:  Gradual  Timing:  Constant  Progression:  Unchanged  Chronicity:  New  Context: at rest    Context: not breathing    Relieved by:  None tried  Worsened by:  Nothing  Associated symptoms: lower extremity edema and shortness of breath    Associated symptoms: no abdominal pain, no altered mental status, no back pain, no cough, no fatigue, no fever, no headache, no nausea, no vomiting and no weakness           Review of Systems   Constitutional: Negative for chills, fatigue and fever. HENT: Negative for congestion and sore throat. Eyes: Negative for pain and visual disturbance. Respiratory: Positive for shortness of breath. Negative for cough and wheezing. Cardiovascular: Positive for chest pain. Gastrointestinal: Negative for abdominal pain, diarrhea, nausea and vomiting. Genitourinary: Negative for dysuria and frequency. Musculoskeletal: Negative for back pain. Skin: Negative for rash and wound. Neurological: Negative for weakness and headaches. All other systems reviewed and are negative. Physical Exam  Vitals and nursing note reviewed. Constitutional:       General: She is not in acute distress. Appearance: She is well-developed. She is ill-appearing (Chronically).    HENT:      Head: Normocephalic and atraumatic. Eyes:      Extraocular Movements: Extraocular movements intact. Cardiovascular:      Rate and Rhythm: Normal rate and regular rhythm. Pulses: Normal pulses. Pulmonary:      Effort: Pulmonary effort is normal. No respiratory distress. Breath sounds: Normal breath sounds. No wheezing or rales. Chest:      Chest wall: Tenderness present. Abdominal:      General: There is distension. Palpations: Abdomen is soft. Tenderness: There is no abdominal tenderness. There is no guarding or rebound. Musculoskeletal:         General: Normal range of motion. Cervical back: Normal range of motion and neck supple. Right lower leg: Edema (1+ edema) present. Left lower leg: Edema (2+ edema) present. Comments: Palpable radial as well as DP pulses bilaterally   Skin:     General: Skin is warm and dry. Capillary Refill: Capillary refill takes less than 2 seconds. Neurological:      Mental Status: She is alert and oriented to person, place, and time. Cranial Nerves: No cranial nerve deficit. Sensory: No sensory deficit. Motor: No weakness. Coordination: Coordination normal.          Procedures   Central line-- see separate procedure note by Dr. Nestor Cooper      [facility]   Patient presented to the Emergency Department for chest pain. Clinically stable, mildly hypotensive on arrival. Patient with pressure-like chest pain, concerning for ACS with history and physical exam findings. Aspirin given. EKG stable from prior. Troponin resulted at 200. Repeat 202. More elevated from prior but she has had troponins in the 250s 2 months prior. Most likely secondary to renal disease but with patient's concerning chest story, and significant risk factors, ACS cannot be excluded. No recent stress or echo in computer. Patient no abdominal pain, no concern for SBP. Patient does have evidence of fluid in her bilateral lower extremities as well as abdomen.  BNP greater than 70,000. Concerning for CHF exacerbation. She subsequently became hypertensive in the department more concerning for the chf exacerbation. Nitro given with minimal relief and oral antihypertensives subsequently ordered. Dissection considered but less likely when coupled with patient's history and physical exam finding. Equal pulses in all extremities, blood pressure stable in both upper extremities. Ultrasound of the left lower extremity with no evidence of DVT. PE is considered but less likely when coupled with history and physical exam findings, concern is for fluid overload at this time as well as ACS. The patient concerning cardiac story and elevated troponin, heparin ordered. A CTA was ordered but patient's IV infiltrated twice in CAT scan. A CT without contrast was then performed. Central line placed and CTA performed with no evidence of PE. With patient chest pain ,fluid overload would benefit from admission. ED Course as of Aug 25 2132   Wed Aug 25, 2021   1053 Had ASA in the past    [KP]   1247 Patient reevaluated still having chest pain, pressure improved, will try nitro    [KP]   1302   ATTENDING PROVIDER ATTESTATION:     I have personally performed and/or participated in the history, exam, medical decision making, and procedures and agree with all pertinent clinical information unless otherwise noted. I have also reviewed and agree with the past medical, family and social history unless otherwise noted. I have discussed this patient in detail with the resident and provided the instruction and education regarding the evidence-based evaluation and treatment of chest pain. History: patient presents with complaint of tightness in her chest with radiation to the left elbow and jaw. She is a brittle diabetic with history of CHF. She has also been experiencing increased fluid in the LLE. My findings: Av Torres is a 36 y.o. female whom is in no distress.  Physical exam reveals slightly uncomfortable patient. Heart RRR, lungs CTA, abdomen is soft and nontender. Peritoneal dialysis catheter without erythema or discharge. LLE edema> R. My plan: Symptomatic and supportive care. Will evaluate with labs and imaging. Electronically signed by Hollie Saul DO on 8/25/21 at 1:02 PM EDT          [JS]   [address] with some improvement, not completely resolved    [KP]   [phone] Reevaluated patient she states she is unsure if the nitro helped even though earlier she told me it did help. We will try 1 more sublingual as well as Nitropaste this patient's pressure is elevated and concern for fluid overload. [KP]   2632 Reevaluated patient, still pressure-like pain on the left side of her chest.  States is difficult to describe. Denies any sharp or stabbing pain. [KP]   1708 1 peripheral line by nursing as well as to ultrasound and IV lines placed. Patient's line subsequently blew twice in CT. Will place central line. Awaiting hospitalist    [KP]   [phone] Patient reevaluated after morphine. Chest pain is a 3 out of 10. Still described as a pressure-like sensation on the left side of her chest but significantly improved    [KP]   1729 Spoke with kendra, wants to speak with nephrology prior to  getting CTA as concern with contrast load. [KP]   1841 Case discussed with Dr. Jyoti Vera, detailed overview given, recommendations are to go ahead and give the CTA and he will follow with the patient in the hospital.    [NC]   1910 Case discussed with Dr. Yvette Nichols, detailed overview given, they will admit the patient.     [NC]      ED Course User Index  [JS] Hollie Saul DO  [KP] Jessa Keita DO  [NC] Olga Mendes, DO            ----------------------------------------------- PAST HISTORY --------------------------------------------  Past Medical History:  has a past medical history of Acute congestive heart failure ([facility] [address] BC (acute kidney injury) ([facility] [address] Cardiac She reports that she does not drink alcohol and does not use drugs. Family History: family history includes Asthma in her brother and mother; Diabetes in her mother; High Blood Pressure in her father and mother; Hypertension in her mother. The patients home medications have been reviewed.     Allergies: Cefepime and Toradol [ketorolac tromethamine]    ------------------------------------------------ RESULTS ---------------------------------------------------    LABS:  Results for orders placed or performed during the hospital encounter of 08/25/21   CBC Auto Differential   Result Value Ref Range    WBC 9.5 4.5 - 11.5 E9/L    RBC 3.58 3.50 - 5.50 E12/L    Hemoglobin 11.2 (L) 11.5 - 15.5 g/dL    Hematocrit 33.7 (L) 34.0 - 48.0 %    MCV 94.1 80.0 - 99.9 fL    MCH 31.3 26.0 - 35.0 pg    MCHC 33.2 32.0 - 34.5 %    RDW 15.7 (H) 11.5 - 15.0 fL    Platelets 623 735 - 735 E9/L    MPV 10.5 7.0 - 12.0 fL    Neutrophils % 73.1 43.0 - 80.0 %    Immature Granulocytes % 0.3 0.0 - 5.0 %    Lymphocytes % 18.2 (L) 20.0 - 42.0 %    Monocytes % 3.4 2.0 - 12.0 %    Eosinophils % 3.3 0.0 - 6.0 %    Basophils % 1.7 0.0 - 2.0 %    Neutrophils Absolute 6.96 1.80 - 7.30 E9/L    Immature Granulocytes # 0.03 E9/L    Lymphocytes Absolute 1.73 1.50 - 4.00 E9/L    Monocytes Absolute 0.32 0.10 - 0.95 E9/L    Eosinophils Absolute 0.31 0.05 - 0.50 E9/L    Basophils Absolute 0.16 0.00 - 0.20 E9/L   Comprehensive Metabolic Panel w/ Reflex to MG   Result Value Ref Range    Sodium 138 132 - 146 mmol/L    Potassium reflex Magnesium 2.9 (L) 3.5 - 5.0 mmol/L    Chloride 96 (L) 98 - 107 mmol/L    CO2 27 22 - 29 mmol/L    Anion Gap 15 7 - 16 mmol/L    Glucose 195 (H) 74 - 99 mg/dL    BUN 25 (H) 6 - 20 mg/dL    CREATININE 7.2 (H) 0.5 - 1.0 mg/dL    GFR Non-African American 8 >=60 mL/min/1.73    GFR African American 8     Calcium 7.8 (L) 8.6 - 10.2 mg/dL    Total Protein 5.3 (L) 6.4 - 8.3 g/dL    Albumin 2.4 (L) 3.5 - 5.2 g/dL    Total Bilirubin <0.2 0.0 - 1.2 mg/dL    Alkaline Phosphatase 257 (H) 35 - 104 U/L    ALT 18 0 - 32 U/L    AST 32 (H) 0 - 31 U/L   Troponin   Result Value Ref Range    Troponin, High Sensitivity 200 (H) 0 - 9 ng/L   Brain Natriuretic Peptide   Result Value Ref Range    Pro-BNP >70,000 (H) 0 - 125 pg/mL   Magnesium   Result Value Ref Range    Magnesium 1.7 1.6 - 2.6 mg/dL   HCG, SERUM, QUALITATIVE   Result Value Ref Range    hCG Qual NEGATIVE NEGATIVE   Troponin   Result Value Ref Range    Troponin, High Sensitivity 202 (H) 0 - 9 ng/L   APTT   Result Value Ref Range    aPTT 26.3 24.5 - 35.1 sec   POCT glucose   Result Value Ref Range    Glucose 177 mg/dL    QC OK? yes    POCT Glucose   Result Value Ref Range    Meter Glucose 177 (H) 74 - 99 mg/dL   EKG 12 Lead   Result Value Ref Range    Ventricular Rate 94 BPM    Atrial Rate 94 BPM    P-R Interval 118 ms    QRS Duration 84 ms    Q-T Interval 436 ms    QTc Calculation (Bazett) 545 ms    P Axis 41 degrees    R Axis 30 degrees    T Axis 61 degrees       RADIOLOGY:    All Radiology results interpreted by Radiologist unless otherwise noted. CTA PULMONARY W CONTRAST   Final Result   1. No pulmonary embolism. 2. Nonspecific dependent pulmonary opacities in the lower lobes, increased as   of the CT of the chest from approximately 3 hours earlier. They may   represent atelectasis or pneumonia. 3. Prominent ascites in the upper abdomen. 4. Mild chronic compression fracture deformities from the T3-T8 vertebral   bodies. Old healed bilateral rib fractures. CT CHEST WO CONTRAST   Final Result   Gravity dependent changes and mild haziness at the posterior lung bases. Otherwise no acute process. Large ascites in the abdomen. XR CHEST 1 VIEW   Final Result   No acute process. US DUP LOWER EXTREMITY LEFT RODO   Final Result   No evidence of DVT in the left lower extremity. EKG:  This EKG is signed and interpreted by ED Physician.   Time:  1038   Rate: 94  Rhythm: Sinus. Interpretation: non-specific EKG. normal axis deviation. LVH. QTc 545. No STEMI. Nonspecific ST changes V1, V2 (seen on prior)  Comparison: stable as compared to patient's most recent EKG.    ---------------------------- NURSING NOTES AND VITALS REVIEWED -------------------------   The nursing notes within the ED encounter and vital signs as below have been reviewed. /77   Pulse 79   Temp 97.8 °F (36.6 °C) (Temporal)   Resp 16   Wt 131 lb (59.4 kg)   SpO2 99%   BMI 22.49 kg/m²   Oxygen Saturation Interpretation: Normal      ------------------------------------------PROGRESS NOTES -------------------------------------------    ED COURSE MEDICATIONS:                Medications   sodium chloride flush 0.9 % injection (  Canceled Entry 8/25/21 1105)   sodium chloride 0.9 % infusion (  Canceled Entry 8/25/21 1105)   aspirin chewable tablet 324 mg (324 mg Oral Given 8/25/21 1105)   0.9 % sodium chloride bolus (0 mLs IntraVENous Stopped 8/25/21 1244)   potassium chloride (KLOR-CON M) extended release tablet 40 mEq (40 mEq Oral Given 8/25/21 1246)   nitroGLYCERIN (NITROSTAT) SL tablet 0.4 mg (0.4 mg Sublingual Given 8/25/21 1545)   nitroglycerin (NITRO-BID) 2 % ointment 0.5 inch (0.5 inches Topical Given 8/25/21 1546)   heparin (porcine) injection 3,560 Units (3,560 Units IntraVENous Given 8/25/21 1803)   morphine (PF) injection 4 mg (4 mg Intramuscular Given 8/25/21 1642)   hydrALAZINE (APRESOLINE) tablet 25 mg (25 mg Oral Given 8/25/21 1758)   morphine injection 4 mg (4 mg IntraVENous Given 8/25/21 1800)   iopamidol (ISOVUE-370) 76 % injection 75 mL (75 mLs IntraVENous Given 8/25/21 1828)       CONSULTATIONS:            Consultation:  Geoffrey Fang, medicine, will admit  Jorden nephro, will consult    . PROCEDURES:            Central line.       COUNSELING:   I have spoken with the patient and discussed todays results, in addition to providing specific details for the plan of care and counseling regarding the diagnosis and prognosis.     --------------------------------------- IMPRESSION & DISPOSITION --------------------------------     IMPRESSION(s):  1. Chest pain, unspecified type    2. Other hypervolemia    3. Acute on chronic congestive heart failure, unspecified heart failure type (HCC)    4. Elevated troponin    5. Hypokalemia    6. ESRD (end stage renal disease) ([facility] [address]        This patient's ED course included: a personal history and physicial examination, re-evaluation prior to disposition, multiple bedside re-evaluations, IV medications, cardiac monitoring and continuous pulse oximetry    This patient has been closely monitored during their ED course. DISPOSITION:  Disposition: Admit to telemetry. Patient condition is stable. END OF PROVIDER NOTE.             Presley Apgar, DO  Resident  [phone]

## 2021-08-25 NOTE — ED NOTES
Iv site infiltrated in L ac, attempting new site at this time. dr quinn aware.      Bethany Olivia, RN  08/25/21 [id number]

## 2021-08-25 NOTE — ED NOTES
Blood drawn and sent to lab.   Waiting pregnancy lab before CT per dr cheyenne Galarza, RN  08/25/21 1787

## 2021-08-25 NOTE — H&P
polyneuropathy associated with type 1 diabetes mellitus ([facility] 2020    Drug use complicating pregnancy in third trimester     Endocarditis 10/31/2020    ESRD (end stage renal disease) ([facility] 2020    Hemodialysis patient ([facility]     History of blood transfusion 2019    Hyperlipidemia 10/8/2020    Hyperosmolar hyperglycemic state (HHS) ([facility] 2020    Hypothyroidism 10/8/2020    Iron deficiency anemia 10/1/2019    MDRO (multiple drug resistant organisms) resistance     MRSA (methicillin resistant Staphylococcus aureus)     back wound abcess    Non compliance w medication regimen 3/30/2016    Other disorders of kidney and ureter     Pregnancy 3/30/2016    16 weeks    Previous  delivery affecting pregnancy, antepartum 3/2/2017    Previous stillbirth or demise, antepartum 2016    Seizure ([facility] 2020    Severe pre-eclampsia in third trimester 2016    Shock liver 2/15/2021    Valvular endocarditis 11/10/2020    This Diagnosis was added to the Problem List based on transcribed orders from Dr. Key Garcia          Surgical History:  Past Surgical History:   Procedure Laterality Date    BACK SURGERY      abscess     SECTION      x2    CHOLECYSTECTOMY, LAPAROSCOPIC N/A 2019    CHOLECYSTECTOMY LAPAROSCOPIC performed by Kimberly Resendez MD at [facility] 2018    COLONOSCOPY WITH BIOPSY performed by Alexus Melgoza MD at [place] [address] N/A 2018    COLONOSCOPY WITH BIOPSY performed by Srikanth Reyes MD at [address] [place] [address] ECHO COMPL [address]  2012    EF 57%    ECHO COMPL W DOP COLOR FLOW  6/10/2013         EMBOLECTOMY N/A 2020    ANGIOVAC, VEGECTOMY, YULISSA -- REQS ROOM 3 performed by Shane Bowden MD at [address] [place] [address] Left 2021    LEFT LEG INCISION AND DRAINAGE, DEBRIDEMENT, WOUND VAC APPLICATION performed by Virginia Arriola DPM at [address] [place] [address] Left 2021    LEFT LEG DEBRIDEMENT BIOPSY POSS APPLICATION WOUND VAC performed by Costa Queen DPM at [facility] N/A 6/26/2020    LAPAROSCOPIC INSERTION PERITONEAL DIALYSIS CATHETER performed by Kenisha Fierro MD at [facility] ESOPHAGOGASTRODUODENOSCOPY TRANSORAL DIAGNOSTIC N/A 5/30/2018    EGD ESOPHAGOGASTRODUODENOSCOPY performed by Haley Snow MD at [address] [place] [address] TRANSESOPHAGEAL ECHOCARDIOGRAM N/A 10/19/2020    TRANSESOPHAGEAL ECHOCARDIOGRAM WITH BUBBLE STUDY performed by Latrell Merritt MD at [address] [place] [address] TUNNELED VENOUS PORT PLACEMENT  04/2018    UPPER GASTROINTESTINAL ENDOSCOPY  12/18/2018    EGD BIOPSY performed by Oumar Winters MD at [address] [facility] [facility] 10/11/2019    EGD ESOPHAGOGASTRODUODENOSCOPY performed by Gaudencio Garcia DO at [address] [facility] [facility] N/A 7/14/2021    EGD BIOPSY performed by Davin Tompkins MD at [facility]       Medications Prior to Admission:    Prior to Admission medications    Medication Sig Start Date End Date Taking? Authorizing Provider   carvedilol (COREG) 6.25 MG tablet Take 1 tablet by mouth 2 times daily (with meals) Replaced Lopressor 7/15/21   Edgard Garcia MD   pregabalin (LYRICA) 25 MG capsule Take 1 capsule by mouth daily for 30 days. 7/16/21 8/16/21  Edgard Garcia MD   insulin glargine (LANTUS) 100 UNIT/ML injection vial Inject 7 Units into the skin daily New change in dose and frequency 7/3/21   Edgard Garcia MD   sevelamer (RENVELA) 800 MG tablet Take 1 tablet by mouth 3 times daily (with meals) New lower dose 7/2/21   Edgard Garcia MD   insulin lispro, 1 Unit Dial, (HUMALOG KWIKPEN) 100 UNIT/ML SOPN Inject 3 units with meals + sliding scale.  MAX 30U/day 6/1/21   Vinnie Rodriguez MD   Insulin Pen Needle (BD PEN NEEDLE NAV U/F) 32G X 4 MM MISC Uses with insulin 4 times a day 6/1/21   Vinnie Rodriguez MD   blood glucose monitor strips Freestyle Lite Strips. Checks 4 times/day before meals and at bedtime and as needed for symptoms of irregular blood glucose. 6/1/21   Zeke Paiz MD   mupirocin (BACTROBAN) 2 % ointment Apply 15 g topically daily Apply topically daily to left leg. Historical Provider, MD   folic acid (FOLVITE) 1 MG tablet Take 1 tablet by mouth daily 5/19/21 8/16/21  Burton Ha MD   mirtazapine (REMERON) 15 MG tablet Take 15 mg by mouth nightly    Historical Provider, MD   hydrOXYzine (ATARAX) 25 MG tablet Take 25 mg by mouth daily    Historical Provider, MD   polyethylene glycol (GLYCOLAX) 17 g packet Take 17 g by mouth daily as needed for Constipation    Historical Provider, MD   albuterol (PROVENTIL) (2.5 MG/3ML) 0.083% nebulizer solution Take 2.5 mg by nebulization every 6 hours as needed for Wheezing    Historical Provider, MD   ARIPiprazole (ABILIFY) 5 MG tablet Take 5 mg by mouth nightly 4/18/21   Historical Provider, MD   Glucagon rDNA, (GLUCAGON EMERGENCY IJ) Inject as directed    Historical Provider, MD   glucose (GLUTOSE) 40 % GEL Take 15 g by mouth See Admin Instructions    Historical Provider, MD   pantoprazole (PROTONIX) 40 MG tablet Take 1 tablet by mouth every morning (before breakfast) 4/19/21   Jacinda Littlejohn DO   levothyroxine (SYNTHROID) 75 MCG tablet TAKE 1 TABLET BY MOUTH IN THE MORNING BEFORE BREAKFAST 4/19/21   Jacinda Littlejohn DO   epoetin nena-epbx (RETACRIT) 3000 UNIT/ML SOLN injection Inject 1 mL into the skin three times a week 3/1/21   Marlene Moss MD   rOPINIRole (REQUIP) 0.25 MG tablet Take 0.25 mg by mouth nightly    Historical Provider, MD   blood glucose test strips (CONTOUR NEXT TEST) strip 1 each by In Vitro route 5 times daily As needed.  12/14/20   Zeke Paiz MD   metoclopramide (REGLAN) 5 MG tablet Take 5 mg by mouth 3 times daily     Historical Provider, MD       Allergies:    Cefepime and Toradol [ketorolac tromethamine]    Social History:    reports that she quit smoking about 6 months ago. Her smoking use included cigarettes. She has a 3.00 pack-year smoking history. She has never used smokeless tobacco. She reports that she does not drink alcohol and does not use drugs. Family History:   family history includes Asthma in her brother and mother; Diabetes in her mother; High Blood Pressure in her father and mother; Hypertension in her mother. PHYSICAL EXAM:  Vitals:  /77   Pulse 79   Temp 97.8 °F (36.6 °C) (Temporal)   Resp 16   Wt 131 lb (59.4 kg)   SpO2 99%   BMI 22.49 kg/m²     General Appearance: alert and oriented to person, place and time and in no acute distress  Skin: warm and dry  Head: normocephalic and atraumatic  Eyes: pupils equal, round, and reactive to light, extraocular eye movements intact, conjunctivae normal  Neck: neck supple and non tender without mass   Pulmonary/Chest: clear to auscultation bilaterally- no wheezes, rales or rhonchi, normal air movement, no respiratory distress  Cardiovascular: normal rate, normal S1 and S2 and no carotid bruits  Abdomen: soft, non-tender, non-distended, normal bowel sounds, no masses or organomegaly  Extremities: no cyanosis, no clubbing and no edema  Neurologic: no cranial nerve deficit and speech normal    LABS:  Recent Labs     08/25/21  1045 08/25/21  1101   NA  --  138   K  --  2.9*   CL  --  96*   CO2  --  27   BUN  --  25*   CREATININE  --  7.2*   GLUCOSE 177 195*   CALCIUM  --  7.8*       Recent Labs     08/25/21  1101   WBC 9.5   RBC 3.58   HGB 11.2*   HCT 33.7*   MCV 94.1   MCH 31.3   MCHC 33.2   RDW 15.7*      MPV 10.5       No results for input(s): POCGLU in the last 72 hours.     CBC:   Lab Results   Component Value Date    WBC 9.5 08/25/2021    RBC 3.58 08/25/2021    HGB 11.2 08/25/2021    HCT 33.7 08/25/2021    MCV 94.1 08/25/2021    MCH 31.3 08/25/2021    MCHC 33.2 08/25/2021    RDW 15.7 08/25/2021     08/25/2021    MPV 10.5 08/25/2021     CMP:    Lab Results   Component Value Date     08/25/2021    K 2.9 08/25/2021    CL 96 08/25/2021    CO2 27 08/25/2021    BUN 25 08/25/2021    CREATININE 7.2 08/25/2021    GFRAA 8 08/25/2021    LABGLOM 8 08/25/2021    GLUCOSE 195 08/25/2021    GLUCOSE 130 05/18/2012    PROT 5.3 08/25/2021    LABALBU 2.4 08/25/2021    LABALBU 4.1 05/18/2012    CALCIUM 7.8 08/25/2021    BILITOT <0.2 08/25/2021    ALKPHOS 257 08/25/2021    AST 32 08/25/2021    ALT 18 08/25/2021       Radiology:   CTA PULMONARY W CONTRAST   Final Result   1. No pulmonary embolism. 2. Nonspecific dependent pulmonary opacities in the lower lobes, increased as   of the CT of the chest from approximately 3 hours earlier. They may   represent atelectasis or pneumonia. 3. Prominent ascites in the upper abdomen. 4. Mild chronic compression fracture deformities from the T3-T8 vertebral   bodies. Old healed bilateral rib fractures. CT CHEST WO CONTRAST   Final Result   Gravity dependent changes and mild haziness at the posterior lung bases. Otherwise no acute process. Large ascites in the abdomen. XR CHEST 1 VIEW   Final Result   No acute process. US DUP LOWER EXTREMITY LEFT RODO   Final Result   No evidence of DVT in the left lower extremity. EKG: Sinus rhythm, no acute changes    ASSESSMENT:      Active Problems:    Acute on chronic systolic CHF (congestive heart failure) (HCC)  Resolved Problems:    * No resolved hospital problems. *      PLAN:    Chest pain  62-year-old female with history of ESRD on PD, poorly controlled diabetes, DKA, cardiac arrest, presenting with complaints of chest pain. There was initial concern for PE versus ACS. Troponin is stable near patient's baseline. CTA of the chest is negative for PE.  X-ray was negative for acute process, but there was haziness bilaterally on CT suggesting possible pulmonary edema and there was concern in the ED for possible CHF.   Patient had an echo earlier this year that showed no evidence of CHF. She was originally on a heparin infusion due to the above concerns prior to rule out. Medicine was asked to admit for further treatment and observation.  -Admit to medical unit  -Maintain on telemetry  -DC Heparin infusion    Secondary hypertension  Blood pressure initially was reported as 87/57. Was up tonight 190/114 1 point. Currently improved down to the 251S systolic after nitro and hydralazine.  -Continue home blood pressure medications    End-stage renal disease on peritoneal dialysis  -Nephrology consult for dialysis management  -Continue home dose of Renvela    Insulin-dependent diabetes  History of DKA with ICU admissions. No evidence of DKA today. Glucose 177 initially.  -Glucose checks before meals and at bedtime  -Sliding scale before meals and at bedtime  -Continue home dose of Lantus  -Hypoglycemia protocol  -Diet    Hypothyroidism  -Continue home dose of levothyroxine    GERD  -Continue home dose of PPI      Code Status: Full  DVT prophylaxis: Heparin      NOTE: This report was transcribed using voice recognition software. Every effort was made to ensure accuracy; however, inadvertent computerized transcription errors may be present.   Electronically signed by Manjeet Brunner DO on 8/26/2021 at 12:13 AM

## 2021-08-25 NOTE — ED NOTES
Patient family member, mother, Barak Griffith, updated via phonecall per patient's request.         Lizzie Pinto RN  08/25/21 [id number]

## 2021-08-25 NOTE — ED NOTES
Patient changed into hospital gown. Placed on telemetry monitor and continuous pulse ox at this time. Rhythm strip printed and placed in patient folder.  EKG in process by aide at this time       Temo Hernandes RN  08/25/21 9230

## 2021-08-25 NOTE — ED NOTES
Central Line Placement Procedure Note    Indication: poor peripheral access    Consent: The patient was counseled regarding the procedure, its indications, risks, potential complications and alternatives, and any questions were answered. Consent was obtained to proceed. Procedure: The patient was positioned appropriately and the skin over the right femoral vein was prepped with betadine and draped in a sterile fashion. Local anesthesia was obtained by infiltration using 1% Lidocaine without epinephrine. A large bore needle was used to identify the vein. A guide wire was then inserted into the vein through the needle. A triple lumen catheter was then inserted into the vessel over the guide wire using the Seldinger technique. All ports showed good, free flowing blood return and were flushed with saline solution. The catheter was then securely fastened to the skin with suture. Two sutures were placed into the kit included tube clamp, proximal eyelets and a suture end from each of the securing sutures was extended around the catheter and tied to the proximal eyelets as an added measure to prevent dislodgement. An antibiotic disk was placed and the site was then covered with a sterile dressing. A post procedure X-ray was not indicated. The patient tolerated the procedure well.     Complications: None          Zoie Blisss, DO  Resident  08/25/21 [id number]

## 2021-08-25 NOTE — ED NOTES
Pt sent back from CT.  [employer] stated IV not working, to reassess now     Janey Galarza RN  08/25/21 [phone]

## 2021-08-25 NOTE — ED NOTES
Radiology Procedure Waiver   Name: Catherine Painting  : 1990  MRN: [id number]    Date:  21    Time: 2:10 PM EDT    Benefits of immediately proceeding with radiology exam(s) without pre-testing outweigh the risks or are not indicated as specified below and therefore the following is/are being waived:    [] Benefits of immediate radiology exam(s) outweigh any risk. OR    Pre-exam testing is not indicated for the following reason(s):  [] Pregnancy test   [] Patients LMP on-time and regular.   [] Patient had Tubal Ligation or has other Contraception Device. [] Patient  is Menopausal or Premenarcheal.    [] Patient had Full or Partial Hysterectomy. [] Protocol for CT contrast allegry   [] Patient has tolerated well previously   [] Patient does not have a true allergy    [] MRI Questionnaire     [] BUN/Creatinine   [] Patient age w/no hx of renal dysfunction. [x] Patient on Dialysis. [] Recent Normal Labs.   Electronically signed by Mouna Jon DO on 21 at 2:10 PM EDT               Mouna Jon DO  Resident  21 [phone]

## 2021-08-26 PROBLEM — R07.89 ATYPICAL CHEST PAIN: Status: ACTIVE | Noted: 2021-08-26

## 2021-08-26 LAB
ANION GAP SERPL CALCULATED.3IONS-SCNC: 14 MMOL/L (ref 7–16)
BUN BLDV-MCNC: 27 MG/DL (ref 6–20)
CALCIUM SERPL-MCNC: 7.4 MG/DL (ref 8.6–10.2)
CHLORIDE BLD-SCNC: 97 MMOL/L (ref 98–107)
CO2: 26 MMOL/L (ref 22–29)
CREAT SERPL-MCNC: 7.6 MG/DL (ref 0.5–1)
GFR AFRICAN AMERICAN: 8
GFR NON-AFRICAN AMERICAN: 8 ML/MIN/1.73
GLUCOSE BLD-MCNC: 233 MG/DL (ref 74–99)
GLUCOSE BLD-MCNC: 329 MG/DL
HCT VFR BLD CALC: 33.8 % (ref 34–48)
HEMOGLOBIN: 10.6 G/DL (ref 11.5–15.5)
MCH RBC QN AUTO: 30.5 PG (ref 26–35)
MCHC RBC AUTO-ENTMCNC: 31.4 % (ref 32–34.5)
MCV RBC AUTO: 97.4 FL (ref 80–99.9)
METER GLUCOSE: 114 MG/DL (ref 74–99)
METER GLUCOSE: 292 MG/DL (ref 74–99)
METER GLUCOSE: 313 MG/DL (ref 74–99)
METER GLUCOSE: 329 MG/DL (ref 74–99)
PDW BLD-RTO: 16.1 FL (ref 11.5–15)
PLATELET # BLD: 271 E9/L (ref 130–450)
PMV BLD AUTO: 11 FL (ref 7–12)
POTASSIUM REFLEX MAGNESIUM: 4 MMOL/L (ref 3.5–5)
RBC # BLD: 3.47 E12/L (ref 3.5–5.5)
SODIUM BLD-SCNC: 137 MMOL/L (ref 132–146)
WBC # BLD: 6.4 E9/L (ref 4.5–11.5)

## 2021-08-26 PROCEDURE — 82962 GLUCOSE BLOOD TEST: CPT

## 2021-08-26 PROCEDURE — 99233 SBSQ HOSP IP/OBS HIGH 50: CPT | Performed by: INTERNAL MEDICINE

## 2021-08-26 PROCEDURE — 5A1D70Z PERFORMANCE OF URINARY FILTRATION, INTERMITTENT, LESS THAN 6 HOURS PER DAY: ICD-10-PCS | Performed by: INTERNAL MEDICINE

## 2021-08-26 PROCEDURE — 85027 COMPLETE CBC AUTOMATED: CPT

## 2021-08-26 PROCEDURE — 99254 IP/OBS CNSLTJ NEW/EST MOD 60: CPT | Performed by: INTERNAL MEDICINE

## 2021-08-26 PROCEDURE — 6370000000 HC RX 637 (ALT 250 FOR IP): Performed by: INTERNAL MEDICINE

## 2021-08-26 PROCEDURE — 90945 DIALYSIS ONE EVALUATION: CPT

## 2021-08-26 PROCEDURE — 2060000000 HC ICU INTERMEDIATE R&B

## 2021-08-26 PROCEDURE — 6360000002 HC RX W HCPCS: Performed by: INTERNAL MEDICINE

## 2021-08-26 PROCEDURE — 80048 BASIC METABOLIC PNL TOTAL CA: CPT

## 2021-08-26 PROCEDURE — APPSS45 APP SPLIT SHARED TIME 31-45 MINUTES: Performed by: NURSE PRACTITIONER

## 2021-08-26 RX ORDER — MORPHINE SULFATE 4 MG/ML
4 INJECTION, SOLUTION INTRAMUSCULAR; INTRAVENOUS EVERY 4 HOURS PRN
Status: DISCONTINUED | OUTPATIENT
Start: 2021-08-26 | End: 2021-08-27 | Stop reason: HOSPADM

## 2021-08-26 RX ORDER — MIRTAZAPINE 15 MG/1
15 TABLET, FILM COATED ORAL NIGHTLY
Status: DISCONTINUED | OUTPATIENT
Start: 2021-08-26 | End: 2021-08-27 | Stop reason: HOSPADM

## 2021-08-26 RX ORDER — SODIUM CHLORIDE 9 MG/ML
25 INJECTION, SOLUTION INTRAVENOUS PRN
Status: DISCONTINUED | OUTPATIENT
Start: 2021-08-26 | End: 2021-08-27 | Stop reason: HOSPADM

## 2021-08-26 RX ORDER — SODIUM CHLORIDE 0.9 % (FLUSH) 0.9 %
5-40 SYRINGE (ML) INJECTION PRN
Status: DISCONTINUED | OUTPATIENT
Start: 2021-08-26 | End: 2021-08-27 | Stop reason: HOSPADM

## 2021-08-26 RX ORDER — HEPARIN SODIUM 5000 [USP'U]/ML
5000 INJECTION, SOLUTION INTRAVENOUS; SUBCUTANEOUS EVERY 8 HOURS SCHEDULED
Status: DISCONTINUED | OUTPATIENT
Start: 2021-08-26 | End: 2021-08-27 | Stop reason: HOSPADM

## 2021-08-26 RX ORDER — OXYCODONE HYDROCHLORIDE AND ACETAMINOPHEN 5; 325 MG/1; MG/1
1 TABLET ORAL EVERY 4 HOURS PRN
Status: DISCONTINUED | OUTPATIENT
Start: 2021-08-26 | End: 2021-08-27 | Stop reason: HOSPADM

## 2021-08-26 RX ORDER — HYDRALAZINE HYDROCHLORIDE 20 MG/ML
10 INJECTION INTRAMUSCULAR; INTRAVENOUS EVERY 6 HOURS PRN
Status: DISCONTINUED | OUTPATIENT
Start: 2021-08-26 | End: 2021-08-27 | Stop reason: HOSPADM

## 2021-08-26 RX ORDER — ACETAMINOPHEN 325 MG/1
650 TABLET ORAL EVERY 6 HOURS PRN
Status: DISCONTINUED | OUTPATIENT
Start: 2021-08-26 | End: 2021-08-27 | Stop reason: HOSPADM

## 2021-08-26 RX ORDER — METOCLOPRAMIDE 10 MG/1
5 TABLET ORAL 3 TIMES DAILY
Status: DISCONTINUED | OUTPATIENT
Start: 2021-08-26 | End: 2021-08-27 | Stop reason: HOSPADM

## 2021-08-26 RX ORDER — POLYETHYLENE GLYCOL 3350 17 G/17G
17 POWDER, FOR SOLUTION ORAL DAILY PRN
Status: DISCONTINUED | OUTPATIENT
Start: 2021-08-26 | End: 2021-08-27 | Stop reason: HOSPADM

## 2021-08-26 RX ORDER — SEVELAMER CARBONATE 800 MG/1
800 TABLET, FILM COATED ORAL
Status: DISCONTINUED | OUTPATIENT
Start: 2021-08-26 | End: 2021-08-27 | Stop reason: HOSPADM

## 2021-08-26 RX ORDER — SODIUM CHLORIDE 0.9 % (FLUSH) 0.9 %
5-40 SYRINGE (ML) INJECTION EVERY 12 HOURS SCHEDULED
Status: DISCONTINUED | OUTPATIENT
Start: 2021-08-26 | End: 2021-08-27 | Stop reason: HOSPADM

## 2021-08-26 RX ORDER — HYDROXYZINE HYDROCHLORIDE 25 MG/1
25 TABLET, FILM COATED ORAL DAILY
Status: DISCONTINUED | OUTPATIENT
Start: 2021-08-26 | End: 2021-08-27 | Stop reason: HOSPADM

## 2021-08-26 RX ORDER — DEXTROSE MONOHYDRATE 50 MG/ML
100 INJECTION, SOLUTION INTRAVENOUS PRN
Status: DISCONTINUED | OUTPATIENT
Start: 2021-08-26 | End: 2021-08-27 | Stop reason: HOSPADM

## 2021-08-26 RX ORDER — ROPINIROLE 0.25 MG/1
0.25 TABLET, FILM COATED ORAL NIGHTLY
Status: DISCONTINUED | OUTPATIENT
Start: 2021-08-26 | End: 2021-08-27 | Stop reason: HOSPADM

## 2021-08-26 RX ORDER — ONDANSETRON 4 MG/1
4 TABLET, ORALLY DISINTEGRATING ORAL EVERY 8 HOURS PRN
Status: DISCONTINUED | OUTPATIENT
Start: 2021-08-26 | End: 2021-08-26 | Stop reason: ALTCHOICE

## 2021-08-26 RX ORDER — ALBUTEROL SULFATE 2.5 MG/3ML
2.5 SOLUTION RESPIRATORY (INHALATION) EVERY 6 HOURS PRN
Status: DISCONTINUED | OUTPATIENT
Start: 2021-08-26 | End: 2021-08-27 | Stop reason: HOSPADM

## 2021-08-26 RX ORDER — ACETAMINOPHEN 650 MG/1
650 SUPPOSITORY RECTAL EVERY 6 HOURS PRN
Status: DISCONTINUED | OUTPATIENT
Start: 2021-08-26 | End: 2021-08-27 | Stop reason: HOSPADM

## 2021-08-26 RX ORDER — BUMETANIDE 1 MG/1
2 TABLET ORAL 2 TIMES DAILY
COMMUNITY
End: 2021-09-28 | Stop reason: DRUGHIGH

## 2021-08-26 RX ORDER — ARIPIPRAZOLE 5 MG/1
5 TABLET ORAL NIGHTLY
Status: DISCONTINUED | OUTPATIENT
Start: 2021-08-26 | End: 2021-08-27 | Stop reason: HOSPADM

## 2021-08-26 RX ORDER — PROCHLORPERAZINE EDISYLATE 5 MG/ML
10 INJECTION INTRAMUSCULAR; INTRAVENOUS EVERY 6 HOURS PRN
Status: DISCONTINUED | OUTPATIENT
Start: 2021-08-26 | End: 2021-08-27 | Stop reason: HOSPADM

## 2021-08-26 RX ORDER — PANTOPRAZOLE SODIUM 40 MG/1
40 TABLET, DELAYED RELEASE ORAL
Status: DISCONTINUED | OUTPATIENT
Start: 2021-08-26 | End: 2021-08-27 | Stop reason: HOSPADM

## 2021-08-26 RX ORDER — ONDANSETRON 2 MG/ML
4 INJECTION INTRAMUSCULAR; INTRAVENOUS EVERY 6 HOURS PRN
Status: DISCONTINUED | OUTPATIENT
Start: 2021-08-26 | End: 2021-08-26 | Stop reason: ALTCHOICE

## 2021-08-26 RX ORDER — DEXTROSE MONOHYDRATE 25 G/50ML
12.5 INJECTION, SOLUTION INTRAVENOUS PRN
Status: DISCONTINUED | OUTPATIENT
Start: 2021-08-26 | End: 2021-08-27 | Stop reason: HOSPADM

## 2021-08-26 RX ORDER — INSULIN GLARGINE 100 [IU]/ML
7 INJECTION, SOLUTION SUBCUTANEOUS DAILY
Status: DISCONTINUED | OUTPATIENT
Start: 2021-08-26 | End: 2021-08-27 | Stop reason: HOSPADM

## 2021-08-26 RX ORDER — NICOTINE POLACRILEX 4 MG
15 LOZENGE BUCCAL PRN
Status: DISCONTINUED | OUTPATIENT
Start: 2021-08-26 | End: 2021-08-27 | Stop reason: HOSPADM

## 2021-08-26 RX ORDER — LEVOTHYROXINE SODIUM 0.07 MG/1
75 TABLET ORAL DAILY
Status: DISCONTINUED | OUTPATIENT
Start: 2021-08-26 | End: 2021-08-27 | Stop reason: HOSPADM

## 2021-08-26 RX ORDER — CARVEDILOL 6.25 MG/1
6.25 TABLET ORAL 2 TIMES DAILY WITH MEALS
Status: DISCONTINUED | OUTPATIENT
Start: 2021-08-26 | End: 2021-08-27 | Stop reason: HOSPADM

## 2021-08-26 RX ADMIN — LEVOTHYROXINE SODIUM 75 MCG: 0.07 TABLET ORAL at 09:47

## 2021-08-26 RX ADMIN — SEVELAMER CARBONATE 800 MG: 800 TABLET, FILM COATED ORAL at 18:39

## 2021-08-26 RX ADMIN — SEVELAMER CARBONATE 800 MG: 800 TABLET, FILM COATED ORAL at 13:36

## 2021-08-26 RX ADMIN — INSULIN LISPRO 4 UNITS: 100 INJECTION, SOLUTION INTRAVENOUS; SUBCUTANEOUS at 16:58

## 2021-08-26 RX ADMIN — HEPARIN SODIUM 5000 UNITS: 5000 INJECTION INTRAVENOUS; SUBCUTANEOUS at 21:37

## 2021-08-26 RX ADMIN — MORPHINE SULFATE 4 MG: 4 INJECTION, SOLUTION INTRAMUSCULAR; INTRAVENOUS at 08:52

## 2021-08-26 RX ADMIN — ROPINIROLE HYDROCHLORIDE 0.25 MG: 0.25 TABLET, FILM COATED ORAL at 21:35

## 2021-08-26 RX ADMIN — INSULIN LISPRO 4 UNITS: 100 INJECTION, SOLUTION INTRAVENOUS; SUBCUTANEOUS at 13:34

## 2021-08-26 RX ADMIN — OXYCODONE AND ACETAMINOPHEN 1 TABLET: 5; 325 TABLET ORAL at 20:37

## 2021-08-26 RX ADMIN — PANTOPRAZOLE SODIUM 40 MG: 40 TABLET, DELAYED RELEASE ORAL at 08:51

## 2021-08-26 RX ADMIN — MORPHINE SULFATE 4 MG: 4 INJECTION, SOLUTION INTRAMUSCULAR; INTRAVENOUS at 15:08

## 2021-08-26 RX ADMIN — ARIPIPRAZOLE 5 MG: 5 TABLET ORAL at 21:35

## 2021-08-26 RX ADMIN — INSULIN GLARGINE 7 UNITS: 100 INJECTION, SOLUTION SUBCUTANEOUS at 08:52

## 2021-08-26 RX ADMIN — MIRTAZAPINE 15 MG: 15 TABLET, FILM COATED ORAL at 21:35

## 2021-08-26 RX ADMIN — HYDROXYZINE HYDROCHLORIDE 25 MG: 25 TABLET, FILM COATED ORAL at 08:51

## 2021-08-26 RX ADMIN — CARVEDILOL 6.25 MG: 6.25 TABLET, FILM COATED ORAL at 09:47

## 2021-08-26 RX ADMIN — HEPARIN SODIUM 5000 UNITS: 5000 INJECTION INTRAVENOUS; SUBCUTANEOUS at 08:51

## 2021-08-26 RX ADMIN — MORPHINE SULFATE 4 MG: 4 INJECTION, SOLUTION INTRAMUSCULAR; INTRAVENOUS at 04:19

## 2021-08-26 RX ADMIN — HEPARIN SODIUM 5000 UNITS: 5000 INJECTION INTRAVENOUS; SUBCUTANEOUS at 15:08

## 2021-08-26 RX ADMIN — METOCLOPRAMIDE 5 MG: 10 TABLET ORAL at 15:08

## 2021-08-26 RX ADMIN — METOCLOPRAMIDE 5 MG: 10 TABLET ORAL at 08:51

## 2021-08-26 RX ADMIN — SEVELAMER CARBONATE 800 MG: 800 TABLET, FILM COATED ORAL at 09:47

## 2021-08-26 RX ADMIN — METOCLOPRAMIDE 5 MG: 10 TABLET ORAL at 21:35

## 2021-08-26 ASSESSMENT — PAIN DESCRIPTION - ORIENTATION: ORIENTATION: LEFT

## 2021-08-26 ASSESSMENT — PAIN SCALES - GENERAL
PAINLEVEL_OUTOF10: 0
PAINLEVEL_OUTOF10: 7
PAINLEVEL_OUTOF10: 8
PAINLEVEL_OUTOF10: 0
PAINLEVEL_OUTOF10: 7
PAINLEVEL_OUTOF10: 8
PAINLEVEL_OUTOF10: 4

## 2021-08-26 ASSESSMENT — PAIN DESCRIPTION - FREQUENCY: FREQUENCY: INTERMITTENT

## 2021-08-26 ASSESSMENT — PAIN DESCRIPTION - PAIN TYPE: TYPE: ACUTE PAIN

## 2021-08-26 ASSESSMENT — PAIN DESCRIPTION - DESCRIPTORS: DESCRIPTORS: ACHING

## 2021-08-26 ASSESSMENT — PAIN DESCRIPTION - LOCATION: LOCATION: SHOULDER

## 2021-08-26 NOTE — CONSULTS
Department of Internal Medicine  Nephrology Attending Consult Note      Reason for Consult:  End-Stage Renal Disease  Requesting Physician:  Dr. 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 CTA showed no evidence of PE. Denies fever or chills.       Past Medical History:        Diagnosis Date    Acute congestive heart failure ([address]     BC (acute kidney injury) ([address] 10/1/2019    Cardiac arrest ([address] 2/15/2021    Cephalgia 10/9/2019    Chronic kidney disease     Depression     Diabetes mellitus ([address]     Diabetic gastroparesis associated with type 1 diabetes mellitus ([address] 12/17/2018    Diabetic ketoacidosis ([address] 8/27/2011    Diabetic ketoacidosis with coma associated with type 1 diabetes mellitus ([address] 6/26/2013    Diabetic polyneuropathy associated with type 1 diabetes mellitus ([address] 12/27/2020    Drug use complicating pregnancy in third trimester     Endocarditis 10/31/2020    ESRD (end stage renal disease) ([address] 9/29/2020    Hemodialysis patient ([address]     History of blood transfusion 11/2019    Hyperlipidemia 10/8/2020    Hyperosmolar hyperglycemic state (HHS) ([address] 11/20/2020    Hypothyroidism 10/8/2020    Iron deficiency anemia 10/1/2019    MDRO (multiple drug resistant organisms) resistance     MRSA (methicillin resistant Staphylococcus aureus)     back wound abcess    Non compliance w medication regimen 3/30/2016    Other disorders of kidney and ureter     Pregnancy 3/30/2016    16 weeks    Previous  delivery affecting pregnancy, antepartum 3/2/2017    Previous stillbirth or demise, antepartum 2016    Seizure ([address] 2020    Severe pre-eclampsia in third trimester 2016    Shock liver 2/15/2021    Valvular endocarditis 11/10/2020    This Diagnosis was added to the Problem List based on transcribed orders from Dr. Amy Petty        Past Surgical History:        Procedure Laterality Date    BACK SURGERY      abscess   [address]      x2    CHOLECYSTECTOMY, LAPAROSCOPIC N/A 2019    CHOLECYSTECTOMY LAPAROSCOPIC performed by Thalia Landis MD at [facility] N/A 2018    COLONOSCOPY WITH BIOPSY performed by Lukas Gama MD at [facility] N/A 2018    COLONOSCOPY WITH BIOPSY performed by Jimbo Mayes MD at [facility] [place]  2012    EF 57%    ECHO COMPL W DOP COLOR FLOW  6/10/2013         EMBOLECTOMY N/A 2020    [address] [place] [address], [id number] [place] [address], YULISSA -- REQS ROOM 3 performed by Roxy De Jesus MD at [address] [place] [address] Left 2021    LEFT LEG INCISION AND DRAINAGE, DEBRIDEMENT, WOUND VAC APPLICATION performed by Derek Fish DPM at [address] [place] [address] Left 2021    LEFT LEG DEBRIDEMENT BIOPSY POSS APPLICATION WOUND VAC performed by Derek Fish DPM at [place] [address] N/A 2020    LAPAROSCOPIC INSERTION PERITONEAL DIALYSIS CATHETER performed by Allan Hunt MD at [place] [address] ESOPHAGOGASTRODUODENOSCOPY TRANSORAL DIAGNOSTIC N/A 2018    EGD ESOPHAGOGASTRODUODENOSCOPY performed by Lukas Gama MD at [id number] [place] [address] TRANSESOPHAGEAL ECHOCARDIOGRAM N/A 10/19/2020    TRANSESOPHAGEAL ECHOCARDIOGRAM WITH BUBBLE STUDY performed by Darby Perez MD at [address] [facility] [address] [address]  2018    UPPER GASTROINTESTINAL ENDOSCOPY  2018    EGD BIOPSY performed by Oumar Winters MD at [address] [place] [address] N/A 10/11/2019    EGD ESOPHAGOGASTRODUODENOSCOPY performed by Gaudencio Garcia DO at [address] [place] [address] N/A 7/14/2021    EGD BIOPSY performed by Davin Tompkins MD at [facility] ENDOSCOPY     Current Medications:    Current Facility-Administered Medications: hydrALAZINE (APRESOLINE) injection 10 mg, 10 mg, IntraVENous, Q6H PRN  morphine (PF) injection 4 mg, 4 mg, IntraMUSCular, Q4H PRN  ARIPiprazole (ABILIFY) tablet 5 mg, 5 mg, Oral, Nightly  albuterol (PROVENTIL) nebulizer solution 2.5 mg, 2.5 mg, Nebulization, Q6H PRN  carvedilol (COREG) tablet 6.25 mg, 6.25 mg, Oral, BID WC  hydrOXYzine (ATARAX) tablet 25 mg, 25 mg, Oral, Daily  insulin glargine (LANTUS) injection vial 7 Units, 7 Units, Subcutaneous, Daily  levothyroxine (SYNTHROID) tablet 75 mcg, 75 mcg, Oral, Daily  metoclopramide (REGLAN) tablet 5 mg, 5 mg, Oral, TID  mirtazapine (REMERON) tablet 15 mg, 15 mg, Oral, Nightly  pantoprazole (PROTONIX) tablet 40 mg, 40 mg, Oral, QAM AC  rOPINIRole (REQUIP) tablet 0.25 mg, 0.25 mg, Oral, Nightly  sevelamer (RENVELA) tablet 800 mg, 800 mg, Oral, TID WC  sodium chloride flush 0.9 % injection 5-40 mL, 5-40 mL, IntraVENous, 2 times per day  sodium chloride flush 0.9 % injection 5-40 mL, 5-40 mL, IntraVENous, PRN  0.9 % sodium chloride infusion, 25 mL, IntraVENous, PRN  polyethylene glycol (GLYCOLAX) packet 17 g, 17 g, Oral, Daily PRN  acetaminophen (TYLENOL) tablet 650 mg, 650 mg, Oral, Q6H PRN **OR** acetaminophen (TYLENOL) suppository 650 mg, 650 mg, Rectal, Q6H PRN  insulin lispro (HUMALOG) injection vial 0-6 Units, 0-6 Units, Subcutaneous, TID WC  insulin lispro (HUMALOG) injection vial 0-3 Units, 0-3 Units, Subcutaneous, Nightly  glucose (GLUTOSE) 40 % oral gel 15 g, 15 g, Oral, PRN  dextrose 50 % IV solution, 12.5 g, IntraVENous, PRN  glucagon (rDNA) injection 1 mg, 1 mg, IntraMUSCular, PRN  dextrose 5 % solution, 100 mL/hr, IntraVENous, PRN  prochlorperazine (COMPAZINE) injection 10 mg, 10 mg, IntraVENous, Q6H PRN  heparin (porcine) injection 5,000 Units, 5,000 Units, Subcutaneous, 3 times per day  Allergies:  Cefepime and Toradol [ketorolac tromethamine]    Social History:    TOBACCO:   reports that she quit smoking about 6 months ago. Her smoking use included cigarettes. She has a 3.00 pack-year smoking history. She has never used smokeless tobacco.  ETOH:   reports no history of alcohol use.     Family History:       Problem Relation Age of Onset    Asthma Mother     Hypertension Mother     High Blood Pressure Mother     Diabetes Mother     Asthma Brother     High Blood Pressure Father      REVIEW OF SYSTEMS:      CONSTITUTIONAL:  positive for  fatigue and malaise  EYES:  negative  HEENT:  negative for  hearing loss and epistaxis  RESPIRATORY:  positive for dyspnea  CARDIOVASCULAR:  negative for  chest pain, dyspnea  GASTROINTESTINAL:  positive for nausea  GENITOURINARY:  negative  INTEGUMENT/BREAST:  negative  HEMATOLOGIC/LYMPHATIC:  negative  ALLERGIC/IMMUNOLOGIC:  positive for drug reactions  ENDOCRINE:  positive for weight changes    MUSCULOSKELETAL:  negative  NEUROLOGICAL:  negative      PHYSICAL EXAM:      Vitals:    VITALS:  BP (!) 130/91   Pulse 88   Temp 97.8 °F (36.6 °C) (Temporal)   Resp 23   Ht 5' 4\" (1.626 m)   Wt 131 lb (59.4 kg)   SpO2 99%   BMI 22.49 kg/m²   24HR INTAKE/OUTPUT:  No intake or output data in the 24 hours ending 08/26/21 0948    PD Catheter Exam:  PD catheter exit site clean    Constitutional: Awake in no acute distress  HEENT:  Normocephalic, PERRL  Respiratory: Decreased breath sounds on the basis  Cardiovascular/Edema:  RRR, S1/S2  Gastrointestinal:  Soft, PD catheter exit site clean   Neurologic:  Nonfocal, AVELAR  Skin:  Warm, dry, no lesions  Other:  no edema     DATA:    CBC:   Lab Results   Component Value Date    WBC 6.4 08/26/2021    RBC 3.47 08/26/2021    HGB 10.6 08/26/2021 HCT 33.8 08/26/2021    MCV 97.4 08/26/2021    MCH 30.5 08/26/2021    MCHC 31.4 08/26/2021    RDW 16.1 08/26/2021     08/26/2021    MPV 11.0 08/26/2021     CMP:    Lab Results   Component Value Date     08/26/2021    K 4.0 08/26/2021    CL 97 08/26/2021    CO2 26 08/26/2021    BUN 27 08/26/2021    CREATININE 7.6 08/26/2021    GFRAA 8 08/26/2021    LABGLOM 8 08/26/2021    GLUCOSE 233 08/26/2021    GLUCOSE 130 05/18/2012    PROT 5.3 08/25/2021    LABALBU 2.4 08/25/2021    LABALBU 4.1 05/18/2012    CALCIUM 7.4 08/26/2021    BILITOT <0.2 08/25/2021    ALKPHOS 257 08/25/2021    AST 32 08/25/2021    ALT 18 08/25/2021     Magnesium:    Lab Results   Component Value Date    MG 1.7 08/25/2021     Phosphorus:    Lab Results   Component Value Date    PHOS 4.4 08/11/2021     Radiology Review:      CTA pulmonary with iv contrast 8/25/2021   1. No pulmonary embolism. 2. Nonspecific dependent pulmonary opacities in the lower lobes, increased as   of the CT of the chest from approximately 3 hours earlier. Dalia Margarito may   represent atelectasis or pneumonia. 3. Prominent ascites in the upper abdomen. 4. Mild chronic compression fracture deformities from the T3-T8 vertebral   bodies.  Old healed bilateral rib fractures.         CXR 8/25/2021   No acute process.                 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 CTA showed no evidence of PE. Denies fever or chills. 1. ESRD on PD, to continue CCPD. 2. HTN, on carvedilol  3. MBD of CKD, on sevelamer  4.  Anemia of CKD, to start epoetin

## 2021-08-26 NOTE — CONSULTS
Inpatient Cardiology Consultation      Reason for Consult: PE ruled out by CTA, chest pain    Consulting Physician: Dr. Raj Ortega    Requesting Physician:  Dr. Kush Arevalo    Date of Consultation: 8/26/2021    HISTORY OF PRESENT ILLNESS:       This 56-year-old female is known to [address] [place] [address] and is followed by Dr. Jd Simon. She has a complicated medical history with multiple admissions. 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 She had a right atrial vegetation and Mediport vegetation. She was evaluated by CTS and a YULISSA was done. According to the notes from infectious disease on 2020-10-03 she had staph epidermidis bacteremia and endocarditis with removal of the MediPort and a YULISSA showed a right atrial mass and a C. difficile infection. She was evaluated by CTS and on 2020-11-06, she had a vegectomy. According to the pathology report there were fragments of an organizing thrombus. She was recently admitted to [facility]- from August 7 2 2021-08-11 with DKA and severe hyper glycemia. She presented to the emergency room with chest pain. She was making breakfast for her children and suddenly developed a tightness and heaviness in the center of her chest.  She was short of breath but had no diaphoresis. As she was standing she felt presyncopal but actually did not sit down. She had no palpitations she does admit a cough made her chest pain worse. Blood pressure on admission was 87/57 and then has gone as high as 164/116 and she is afebrile with no hypoxia    Potassium was 2.9 BUN 25 and creatinine 7.2 with a proBNP of over 70,000, troponin 200-202,, pregnancy test negative, hemoglobin 9.5 with hematocrit of 11.2 and 33.7.        EKG shows sinus rhythm with prolonged QT, septal infarct    Ultrasound of the lower extremity showed no DVT, chest x-ray Showed no acute process, CT of the chest without contrast showed ascites in the abdomen, CTA of the chest showed no PE but pulmonary opacities in the lower lobes atelectasis or pneumonia along with the ascites. She was treated with a heparin bolus and given morphine, Nitro-Bid nitroglycerin. Electrolytes were supplemented . Later the heparin was stopped. She continues to have chest pain    PAST MEDICAL HISTORY:    1.  Diabetes mellitus type I.  2.  History of DKA. 3.  End stage renal disease on peritoneal dialysis. 4.  Depression. Anxiety. 5.  Anemia of chronic disease. 6.  History of . 7.  Laparoscopic cholecystectomy. 8.  Hypertension. 9.  2D echocardiogram by Dr. Sophie Romero 10/2019: EF 55%, mild LVH. Mild pulmonary hypertension. Trace TR. No significant valvular abnormalities. 10.  Tobacco abuse.   11. History illicit drug use. 12.  2D echocardiogram in 2020 by Dr. Sophie Romero: EF 60%.  Trace MR.  No significant valvular abnormalities.  No pericardial effusion.  Normal aortic root and ascending aorta. 13.  Compression fractures T3-T8 and old bilateral healed rib fractures  14.   2020 endocarditis with a right atrial vegetation and Mediport vegetation and status post vegectomy on 2020     PRIOR CARDIAC TESTING:  YULISSA (Dr. Umer Horton) 10/2020:  Summary:   Large irregular, 2cm x 1cm, mobile, echogenic mass attached to the right   atrial side of interatrial septum near SVC opening suggestive of   vegetation or thrombus.   Large, 1cm x 1cm, irregular echogenic mass attached to the tip of the   central venous catheter suggestive of vegetation or thrombus.   Normal left ventricular systolic function.   Interatrial septum intact.   Agitated saline injection showed no right to left shunt.   Normal right ventricle size and function.   There is mild tricuspid regurgitation.   Posterior mitral leaflet prolapse.   There is moderate tricuspid regurgitation.   Normal aortic root size.   No evidence of pericardial effusion.   Pericardium appears normal.   Technically sub-optimal images.   Compared to prior TTE from Provider   carvedilol (COREG) 6.25 MG tablet Take 1 tablet by mouth 2 times daily (with meals) Replaced Lopressor 7/15/21   Tish Damon MD   pregabalin (LYRICA) 25 MG capsule Take 1 capsule by mouth daily for 30 days. 7/16/21 8/16/21  Tish Damon MD   insulin glargine (LANTUS) 100 UNIT/ML injection vial Inject 7 Units into the skin daily New change in dose and frequency 7/3/21   Tish Damon MD   sevelamer (RENVELA) 800 MG tablet Take 1 tablet by mouth 3 times daily (with meals) New lower dose 7/2/21   Tish Damon MD   insulin lispro, 1 Unit Dial, (HUMALOG KWIKPEN) 100 UNIT/ML SOPN Inject 3 units with meals + sliding scale. MAX 30U/day 6/1/21   Josh Alexander MD   Insulin Pen Needle (BD PEN NEEDLE NAV U/F) 32G X 4 MM MISC Uses with insulin 4 times a day 6/1/21   Josh Alexander MD   blood glucose monitor strips Freestyle Lite Strips. Checks 4 times/day before meals and at bedtime and as needed for symptoms of irregular blood glucose. 6/1/21   Zeke Lacy MD   mupirocin (BACTROBAN) 2 % ointment Apply 15 g topically daily Apply topically daily to left leg.     Historical Provider, MD   folic acid (FOLVITE) 1 MG tablet Take 1 tablet by mouth daily 5/19/21 8/16/21  Lena Soriano MD   mirtazapine (REMERON) 15 MG tablet Take 15 mg by mouth nightly    Historical Provider, MD   hydrOXYzine (ATARAX) 25 MG tablet Take 25 mg by mouth daily    Historical Provider, MD   polyethylene glycol (GLYCOLAX) 17 g packet Take 17 g by mouth daily as needed for Constipation    Historical Provider, MD   albuterol (PROVENTIL) (2.5 MG/3ML) 0.083% nebulizer solution Take 2.5 mg by nebulization every 6 hours as needed for Wheezing    Historical Provider, MD   ARIPiprazole (ABILIFY) 5 MG tablet Take 5 mg by mouth nightly 4/18/21   Historical Provider, MD   Glucagon rDNA, (GLUCAGON EMERGENCY IJ) Inject as directed    Historical Provider, MD   glucose (GLUTOSE) 40 % GEL Take 15 g by mouth See Admin Instructions    Historical Provider, MD   pantoprazole (PROTONIX) 40 MG tablet Take 1 tablet by mouth every morning (before breakfast) 4/19/21   Jacinda Littlejohn DO   levothyroxine (SYNTHROID) 75 MCG tablet TAKE 1 TABLET BY MOUTH IN THE MORNING BEFORE BREAKFAST 4/19/21   Jacinda Littlejohn DO   epoetin nena-epbx (RETACRIT) 3000 UNIT/ML SOLN injection Inject 1 mL into the skin three times a week 3/1/21   Yulisa Correa MD   rOPINIRole (REQUIP) 0.25 MG tablet Take 0.25 mg by mouth nightly    Historical Provider, MD   blood glucose test strips (CONTOUR NEXT TEST) strip 1 each by In Vitro route 5 times daily As needed.  12/14/20   Zeke Hunter MD   metoclopramide (REGLAN) 5 MG tablet Take 5 mg by mouth 3 times daily     Historical Provider, MD       Current Medications:    Current Facility-Administered Medications: hydrALAZINE (APRESOLINE) injection 10 mg, 10 mg, IntraVENous, Q6H PRN  morphine (PF) injection 4 mg, 4 mg, IntraMUSCular, Q4H PRN  ARIPiprazole (ABILIFY) tablet 5 mg, 5 mg, Oral, Nightly  albuterol (PROVENTIL) nebulizer solution 2.5 mg, 2.5 mg, Nebulization, Q6H PRN  carvedilol (COREG) tablet 6.25 mg, 6.25 mg, Oral, BID WC  hydrOXYzine (ATARAX) tablet 25 mg, 25 mg, Oral, Daily  insulin glargine (LANTUS) injection vial 7 Units, 7 Units, Subcutaneous, Daily  levothyroxine (SYNTHROID) tablet 75 mcg, 75 mcg, Oral, Daily  metoclopramide (REGLAN) tablet 5 mg, 5 mg, Oral, TID  mirtazapine (REMERON) tablet 15 mg, 15 mg, Oral, Nightly  pantoprazole (PROTONIX) tablet 40 mg, 40 mg, Oral, QAM AC  rOPINIRole (REQUIP) tablet 0.25 mg, 0.25 mg, Oral, Nightly  sevelamer (RENVELA) tablet 800 mg, 800 mg, Oral, TID WC  sodium chloride flush 0.9 % injection 5-40 mL, 5-40 mL, IntraVENous, 2 times per day  sodium chloride flush 0.9 % injection 5-40 mL, 5-40 mL, IntraVENous, PRN  0.9 % sodium chloride infusion, 25 mL, IntraVENous, PRN  polyethylene glycol (GLYCOLAX) packet 17 g, 17 g, Oral, Daily PRN  acetaminophen (TYLENOL) tablet 650 mg, 650 mg, Oral, Q6H PRN **OR** acetaminophen (TYLENOL) suppository 650 mg, 650 mg, Rectal, Q6H PRN  insulin lispro (HUMALOG) injection vial 0-6 Units, 0-6 Units, Subcutaneous, TID WC  insulin lispro (HUMALOG) injection vial 0-3 Units, 0-3 Units, Subcutaneous, Nightly  glucose (GLUTOSE) 40 % oral gel 15 g, 15 g, Oral, PRN  dextrose 50 % IV solution, 12.5 g, IntraVENous, PRN  glucagon (rDNA) injection 1 mg, 1 mg, IntraMUSCular, PRN  dextrose 5 % solution, 100 mL/hr, IntraVENous, PRN  prochlorperazine (COMPAZINE) injection 10 mg, 10 mg, IntraVENous, Q6H PRN  heparin (porcine) injection 5,000 Units, 5,000 Units, Subcutaneous, 3 times per day    Allergies:  Cefepime and Toradol [ketorolac tromethamine]    Social History: Quit smoking early this year and denies alcohol lives with her family      Family History:   Family History   Problem Relation Age of Onset    Asthma Mother     Hypertension Mother     High Blood Pressure Mother     Diabetes Mother     Asthma Brother     High Blood Pressure Father        REVIEW OF SYSTEMS:     · Constitutional: Denies fatigue, fevers, chills or night sweats  · Eyes: Denies visual changes or drainage  · ENT: Denies headaches or hearing loss. No mouth sores or sore throat. No epistaxis   · Cardiovascular: Denies chest pain, pressure or palpitations. No lower extremity swelling. · Respiratory: Denies ARIAS, cough, orthopnea or PND. No hemoptysis   · Gastrointestinal: Denies hematemesis or anorexia. No hematochezia or melena    · Genitourinary: Denies urgency, dysuria or hematuria. · Musculoskeletal: Denies gait disturbance, weakness or joint complaints  · Integumentary: Denies rash, hives or pruritis   · Neurological: Denies dizziness, headaches or seizures. No numbness or tingling  · Psychiatric: Denies anxiety or depression. · Endocrine: Denies temperature intolerance. No recent weight change. .  · Hematologic/Lymphatic: Denies abnormal bruising or bleeding. No swollen lymph nodes    PHYSICAL EXAM:   BP (!) 130/91   Pulse 88   Temp 97.8 °F (36.6 °C) (Temporal)   Resp 23   Ht 5' 4\" (1.626 m)   Wt 131 lb (59.4 kg)   SpO2 99%   BMI 22.49 kg/m²   CONST:  Well developed, well nourished who appears of stated age. Awake, alert and cooperative. No apparent distress. HEENT:   Head- Normocephalic, atraumatic   Eyes- Conjunctivae pink, anicteric  Throat- Oral mucosa pink and moist  Neck-  No stridor, trachea midline, no jugular venous distention. No carotid bruit. CHEST: Chest symmetrical and non-tender to palpation. No accessory muscle use or intercostal retractions  RESPIRATORY: Lung sounds - clear throughout fields   CARDIOVASCULAR:     Heart Inspection- shows no noted pulsations  Heart Palpation- no heaves or thrills; PMI is non-displaced   Heart Ausculation- Regular rate and rhythm, no murmur. No s3, s4 or rub   PV: No lower extremity edema. No varicosities. Pedal pulses palpable, no clubbing or cyanosis   ABDOMEN: Soft, non-tender to light palpation. Bowel sounds present. No palpable masses no organomegaly; no abdominal bruit  MS: Good muscle strength and tone. No atrophy or abnormal movements. : Deferred  SKIN: Warm and dry no statis dermatitis or ulcers   NEURO / PSYCH: Oriented to person, place and time. Speech clear and appropriate. Follows all commands. Pleasant affect     DATA:    ECG / Tele strips: please see HPI   Diagnostic:    No intake or output data in the 24 hours ending 08/26/21 1243    Labs:   CBC:   Recent Labs     08/25/21  1101 08/26/21  0629   WBC 9.5 6.4   HGB 11.2* 10.6*   HCT 33.7* 33.8*    271     BMP:   Recent Labs     08/25/21  1101 08/26/21  0629    137   K 2.9* 4.0   CO2 27 26   BUN 25* 27*   CREATININE 7.2* 7.6*   LABGLOM 8 8   CALCIUM 7.8* 7.4*     Mag:   Recent Labs     08/25/21  1101   MG 1.7     Phos: No results for input(s): PHOS in the last 72 hours.   TFT:   Lab Results   Component Value Date    TSH 11.530 (H) 07/12/2021    F8YXPZQ 36.73 (L) 07/20/2020    J6ZDSZH 8.6 11/05/2015    FT3 1.3 (L) 10/31/2020    T4FREE 1.08 02/15/2021      HgA1c:   Lab Results   Component Value Date    LABA1C 8.8 (H) 07/11/2021     No results found for: EAG  proBNP:   Recent Labs     08/25/21  1101   PROBNP >70,000*     PT/INR: No results for input(s): PROTIME, INR in the last 72 hours. APTT:  Recent Labs     08/25/21  1750   APTT 26.3     CARDIAC ENZYMES:  Recent Labs     08/25/21  1101 08/25/21  1259   TROPHS 200* 202*     FASTING LIPID PANEL:  Lab Results   Component Value Date    CHOL 95 01/14/2020    HDL 33 01/14/2020    LDLCALC 46 01/14/2020    TRIG 82 01/14/2020     LIVER PROFILE:  Recent Labs     08/25/21  1101   AST 32*   ALT 18   LABALBU 2.4*     Impressions  1. Atypical chest pain with no EKG changes and elevated troponin but no acute coronary syndrome, in the setting of end-stage renal disease. PE was ruled out by CTA  2. Hypertension uncontrolled on admission questionably due to pain and BP is now controlled  3. Prolonged QT  4. History of endocarditis and vegectomy in November 2020  5. End-stage renal disease on peritoneal dialysis  6. Diabetes with frequent DKA admissions, type I  7. Anxiety and depression  8. Anemia of chronic disease  9. Recent tobacco use and history of illicit drug use  10. Normal left ventricular systolic function by 2D echocardiogram in February 2021 and by limited echo in May 2021          Electronically signed by HEBER Barney CNP on 8/26/2021 at 12:43 PM     Sanjana Aguilar MD    I have personally participated in a face-to-face and personally obtained history and performed physical exam on the date of service. I reviewed chart, vitals, labs and radiologic studies.  I also participated in medical decision making with HEBER Barney CNP on the date of service All of the assessments and recommendations are from me and I agree with all of the pertinent clinical information, assessment and treatment plan. I have reviewed and edited the note above based on my findings during my history, exam, and decision making. Please see my additional contributions to the history, physical exam, assessment, and recommendations below. HISTORY OF PRESENT ILLNESS:     Reviewed, as above      Past medical history:  Reviewed, as above. Past surgical history:  Reviewed, as above. Current medications:  Reviewed, as above    Allergies:  Reviewed, as above    Social history:  Reviewed, as above    Family medical history:  Reviewed, as above. REVIEW OF SYSTEMS:   Reviewed, as above. PHYSICAL EXAM:   CONSTITUTIONAL:  awake, alert, cooperative, no apparent distress, and appears stated age  EYES:  lids and lashes normal and pupils equal, round and reactive to light, anicteric sclerae  HEAD:  normocepalic, without obvious abnormality, atraumatic, pink, moist mucous membranes. NECK:  Supple, symmetrical, trachea midline, no adenopathy, thyroid symmetric, not enlarged and no tenderness, skin normal  HEMATOLOGIC/LYMPHATICS:  no cervical lymphadenopathy and no supraclavicular lymphadenopathy  LUNGS:  No increased work of breathing, good air exchange, clear to auscultation bilaterally, no crackles or wheezing  CARDIOVASCULAR:  Normal apical impulse, regular rate and rhythm, normal S1 and S2, no S3 or S4, and no murmur noted and no JVD, no carotid bruit, no pedal edema, good carotid upstroke bilaterally. ABDOMEN:  Soft, nontender, no masses, no hepatomegaly or splenomegaly, BS+  CHEST: nontender to palpation, expands symmetrically  MUSCULOSKELETAL:  No clubbing no cyanosis. there is no redness, warmth, or swelling of the joints  full range of motion noted  NEUROLOGIC:  Alert, awake,oriented x3  SKIN:  no bruising or bleeding, normal skin color, texture, turgor and no redness, warmth, or swelling    BP (!) 97/52   Pulse 101   Temp 97.2 °F (36.2 °C) (Skin)   Resp 16   Ht 5' 4\" (1.626 m) Wt 154 lb 8.7 oz (70.1 kg)   SpO2 99%   BMI 26.53 kg/m²       No intake/output data recorded. No intake/output data recorded. DATA:   I personally reviewed the admission EKG with the following interpretation: Sinus tachycardia, LVH, old septal wall MI age-indeterminate    ECHO: 5/15/2021,Summary   Limited Echo for LV function. Normal left ventricular chamber size and systolic function, LVEF is   60-65%. Normal right ventricle size and function. Normal aortic root size. No evidence of pericardial effusion. No intra cardiac mass or thrombus. Compared to prior echo from 2/22/21, no significant changes noted.      Stress Test:  Angiography: Not performed to date  Cardiology Labs:   BMP:    Lab Results   Component Value Date     08/27/2021    K 4.5 08/27/2021    K 4.0 08/26/2021    CL 97 08/27/2021    CO2 29 08/27/2021    BUN 29 08/27/2021     CMP:    Lab Results   Component Value Date     08/27/2021    K 4.5 08/27/2021    K 4.0 08/26/2021    CL 97 08/27/2021    CO2 29 08/27/2021    BUN 29 08/27/2021    PROT 5.3 08/25/2021     CBC:    Lab Results   Component Value Date    WBC 6.4 08/26/2021    RBC 3.47 08/26/2021    HGB 10.6 08/26/2021    HCT 33.8 08/26/2021    MCV 97.4 08/26/2021    RDW 16.1 08/26/2021     08/26/2021     PT/INR:  No results found for: PTINR  PT/INR Warfarin:  No components found for: PTPATWAR, PTINRWAR  PTT:    Lab Results   Component Value Date    APTT 26.3 08/25/2021     PTT Heparin:  No components found for: APTTHEP  Magnesium:    Lab Results   Component Value Date    MG 1.7 08/25/2021     TSH:    Lab Results   Component Value Date    TSH 11.530 07/12/2021     TROPONIN:  No components found for: TROP  BNP:    Lab Results   Component Value Date    BNP 5 06/26/2013     FASTING LIPID PANEL:    Lab Results   Component Value Date    CHOL 95 01/14/2020    HDL 33 01/14/2020    TRIG 82 01/14/2020       I have personally reviewed the laboratory, cardiac diagnostic and radiographic testing as outlined above:    IMPRESSION:  1. Chest pain: Atypical, several risk factors for CAD, will schedule for nuclear exercise stress test  2. Elevated troponin: Secondary to comorbid conditions  3. Hypertension: Not well controlled  4. Prolonged QT interval  5. History of endocarditis  6. Type 1 diabetes mellitus  7. End-stage renal disease      RECOMMENDATIONS:   1. Lexiscan stress test  2. Continue current treatment  3. Basic metabolic panel and CBC in a.m.  4. Further cardiac recommendations will be forthcoming pending her clinical course and diagnostic test findings    I have reviewed my findings and recommendations with patient    Thank you for the consult  Electronically signed by Soundra Opitz, MD on 9/6/2021 at 9:33 PM  NOTE: This report was transcribed using voice recognition software.  Every effort was made to ensure accuracy; however, inadvertent computerized transcription errors may be present    Late entry note

## 2021-08-26 NOTE — FLOWSHEET NOTE
Patient still in ED; admitted but no bed assignment yet; Dr. Yoandy Loera said OK to hold peritoneal dialysis tonight; will reassess in am 8/26/2021.

## 2021-08-26 NOTE — FLOWSHEET NOTE
CAPD manual exchange completed without complications, aseptic technique used. effluent drained clear yellow. No dressing was on site when I initially seen patient. Dressing applied using aseptic technique. 2000 ML total removed on initial drain. 08/26/21 1300   Vitals   BP 92/67   Pulse 85   Resp 9   Peritoneal Dialysis Catheter Left lower abdomen   Placement Date/Time: 10/31/20 0704   Catheter Location: Left lower abdomen   Status Accessed   Dressing Status Clean;Dry; Intact   Dressing Gauze   Peritoneal Dialysis (CAPD manual)   Exchange Number 1   Dianeal Solution Dextrose 2.5% in 2000 mL   Peritoneal Input Status Started   Peritoneal Output Status Completed   Effluent Appearance Clear;Yellow   Peritoneal Dialysis Volume In (ml) 2000 ml   Dwell Time (Hours:Minutes) 4

## 2021-08-26 NOTE — FLOWSHEET NOTE
08/26/21 1900   Vitals   /66   Temp 97.3 °F (36.3 °C)   Temp Source Temporal   Pulse 97   Resp 16   Peritoneal Dialysis Catheter Left lower abdomen   Placement Date/Time: 10/31/20 0704   Catheter Location: Left lower abdomen   Status Accessed   Site Condition No Complications   Dressing Status Clean;Dry; Intact   Dressing Occlusive   Cycler   Total Volume Programmed 03869 mL   Therapy Time (Hours:Minutes) 8   Fill Volume 2000 mL   Last Fill Volume 2000 mL   Dextrose Setting Same (Nonextraneal)   Number of Cycles 4   Bag Volume 5000 mL   Number of Bags Used 2   Dianeal Solution   (2.5 % in 5000 ml)   CCPD tx initiated at this time. Drain and fill sequences completed w/o difficulty. Tolerated well. Effluent fluid translucent pale yellow in nature. Aseptic technique maintained t/o procedure.

## 2021-08-26 NOTE — CARE COORDINATION
SS NOTE: COVID NEGATIVE 8/7. Pt is a readmission having been dched on 8/7. SW met with pt today. Pt resides with her mother, Best who is an RN and primary decision maker for pt. Pt has 2 children in the home also under the care of pt's mother. Pt is supervised and assisted by her mother in the 1 floor plan apt they share, mother provides transportation. Pt has dme at home: [facility], shower chair and bsc. Pt is a peritoneal dialysis pt with [facility] and cycles her PD at night. Pt has a hx of HHC with [facility] and has had infusion with them also in the past. Pt has been to LTAC at [facility] in the past also (2/2021). Pt's PCP is Dr Reggie Roman and her pharmacy is [facility] on [address]. Pt plans on returning home at [facility]. HANNHA Santacruz.8/26/2021.12:40 PM.        Readmission Assessment  Number of Days since last admission?: 8-30 days  Previous Disposition: Home with Family  Who is being Interviewed: Patient  What was the patient's/caregiver's perception as to why they think they needed to return back to the hospital?: Other (Comment) (PT HAD CHEST PAIN)  Did you visit your Primary Care Physician after you left the hospital, before you returned this time?: No  Why weren't you able to visit your PCP?: Other (Comment) ([address])  Did you see a specialist, such as Cardiac, Pulmonary, Orthopedic Physician, etc. after you left the hospital?: No  Who advised the patient to return to the hospital?: Self-referral  Does the patient report anything that got in the way of taking their medications?: No  In our efforts to provide the best possible care to you and others like you, can you think of anything that we could have done to help you after you left the hospital the first time, so that you might not have needed to return so soon?: Other (Comment) (NONE)

## 2021-08-26 NOTE — PROGRESS NOTES
[address] [place], [place] [address]ist   Progress Note    Admitting Date and Time: 8/25/2021 10:29 AM  Admit Dx: Acute on chronic systolic CHF (congestive heart failure) (HCC) [I50.23]    Subjective:    Pt feels chest pain is still present. Complains that is tightness in chest but radiated to her left jaw and left elbow. This started yesterday when she was making her daughter breakfast.    Per RN: patient to get dialysis tonight per nephrology.  ARIPiprazole  5 mg Oral Nightly    carvedilol  6.25 mg Oral BID WC    hydrOXYzine  25 mg Oral Daily    insulin glargine  7 Units Subcutaneous Daily    levothyroxine  75 mcg Oral Daily    metoclopramide  5 mg Oral TID    mirtazapine  15 mg Oral Nightly    pantoprazole  40 mg Oral QAM AC    rOPINIRole  0.25 mg Oral Nightly    sevelamer  800 mg Oral TID WC    sodium chloride flush  5-40 mL IntraVENous 2 times per day    insulin lispro  0-6 Units Subcutaneous TID WC    insulin lispro  0-3 Units Subcutaneous Nightly    heparin (porcine)  5,000 Units Subcutaneous 3 times per day     hydrALAZINE, 10 mg, Q6H PRN  morphine, 4 mg, Q4H PRN  albuterol, 2.5 mg, Q6H PRN  sodium chloride flush, 5-40 mL, PRN  sodium chloride, 25 mL, PRN  polyethylene glycol, 17 g, Daily PRN  acetaminophen, 650 mg, Q6H PRN   Or  acetaminophen, 650 mg, Q6H PRN  glucose, 15 g, PRN  dextrose, 12.5 g, PRN  glucagon (rDNA), 1 mg, PRN  dextrose, 100 mL/hr, PRN  prochlorperazine, 10 mg, Q6H PRN         Objective:    BP (!) 130/91   Pulse 88   Temp 97.8 °F (36.6 °C) (Temporal)   Resp 23   Ht 5' 4\" (1.626 m)   Wt 131 lb (59.4 kg)   SpO2 99%   BMI 22.49 kg/m²   General Appearance: patient with head covered with blanket. She was easily aroused and was able to relay some history.  She does not appear to be in acute distress  Skin: warm and dry  Head: normocephalic and atraumatic  Eyes: pupils equal, round, and reactive to light, extraocular eye movements intact, conjunctivae normal  Neck: neck supple and non tender without mass   Pulmonary/Chest: clear to auscultation bilaterally- no wheezes, rales or rhonchi, normal air movement, no respiratory distress  Cardiovascular: normal rate, normal S1 and S2 and no carotid bruits  Abdomen: soft, non-tender, non-distended, normal bowel sounds, no masses or organomegaly  Extremities: no cyanosis, no clubbing and no edema  Neurologic: no cranial nerve deficit and speech normal      Recent Labs     08/25/21  1045 08/25/21  1101 08/26/21  0629   NA  --  138 137   K  --  2.9* 4.0   CL  --  96* 97*   CO2  --  27 26   BUN  --  25* 27*   CREATININE  --  7.2* 7.6*   GLUCOSE 177 195* 233*   CALCIUM  --  7.8* 7.4*       Recent Labs     08/25/21  1101   ALKPHOS 257*   PROT 5.3*   LABALBU 2.4*   BILITOT <0.2   AST 32*   ALT 18       Recent Labs     08/25/21  1101 08/26/21  0629   WBC 9.5 6.4   RBC 3.58 3.47*   HGB 11.2* 10.6*   HCT 33.7* 33.8*   MCV 94.1 97.4   MCH 31.3 30.5   MCHC 33.2 31.4*   RDW 15.7* 16.1*    271   MPV 10.5 11.0       Troponin [[id number]] (Abnormal) Collected: 08/25/21 1259     Specimen: Blood Updated: 08/25/21 1339      Troponin, High Sensitivity 202 ng/L      Troponin [[id number]] (Abnormal) Collected: 08/25/21 1101     Specimen: Blood Updated: 08/25/21 1142      Troponin, High Sensitivity 200 ng/L        Brain Natriuretic Peptide [[id number]] (Abnormal) Collected: 08/25/21 1101     Specimen: Blood Updated: 08/25/21 1142      Pro-BNP >70,000 pg/mL        Radiology:   CTA PULMONARY W CONTRAST   Final Result   1. No pulmonary embolism. 2. Nonspecific dependent pulmonary opacities in the lower lobes, increased as   of the CT of the chest from approximately 3 hours earlier. They may   represent atelectasis or pneumonia. 3. Prominent ascites in the upper abdomen. 4. Mild chronic compression fracture deformities from the T3-T8 vertebral   bodies. Old healed bilateral rib fractures.          CT CHEST WO CONTRAST   Final Result   Gravity dependent

## 2021-08-27 ENCOUNTER — APPOINTMENT (OUTPATIENT)
Dept: NUCLEAR MEDICINE | Age: 31
DRG: 203 | End: 2021-08-27
Payer: COMMERCIAL

## 2021-08-27 VITALS
OXYGEN SATURATION: 99 % | RESPIRATION RATE: 16 BRPM | TEMPERATURE: 97.2 F | BODY MASS INDEX: 26.38 KG/M2 | DIASTOLIC BLOOD PRESSURE: 52 MMHG | WEIGHT: 154.54 LBS | SYSTOLIC BLOOD PRESSURE: 97 MMHG | HEIGHT: 64 IN | HEART RATE: 101 BPM

## 2021-08-27 LAB
ANION GAP SERPL CALCULATED.3IONS-SCNC: 11 MMOL/L (ref 7–16)
BUN BLDV-MCNC: 29 MG/DL (ref 6–20)
CALCIUM IONIZED: 1.01 MMOL/L (ref 1.15–1.33)
CALCIUM SERPL-MCNC: 7.1 MG/DL (ref 8.6–10.2)
CHLORIDE BLD-SCNC: 97 MMOL/L (ref 98–107)
CO2: 29 MMOL/L (ref 22–29)
CREAT SERPL-MCNC: 7.7 MG/DL (ref 0.5–1)
GFR AFRICAN AMERICAN: 8
GFR NON-AFRICAN AMERICAN: 8 ML/MIN/1.73
GLUCOSE BLD-MCNC: 342 MG/DL (ref 74–99)
HCG QUALITATIVE: NEGATIVE
LV EF: 70 %
LVEF MODALITY: NORMAL
METER GLUCOSE: 227 MG/DL (ref 74–99)
METER GLUCOSE: 351 MG/DL (ref 74–99)
METER GLUCOSE: 95 MG/DL (ref 74–99)
POTASSIUM SERPL-SCNC: 4.5 MMOL/L (ref 3.5–5)
SODIUM BLD-SCNC: 137 MMOL/L (ref 132–146)

## 2021-08-27 PROCEDURE — 6370000000 HC RX 637 (ALT 250 FOR IP): Performed by: INTERNAL MEDICINE

## 2021-08-27 PROCEDURE — 3430000000 HC RX DIAGNOSTIC RADIOPHARMACEUTICAL: Performed by: RADIOLOGY

## 2021-08-27 PROCEDURE — 99232 SBSQ HOSP IP/OBS MODERATE 35: CPT | Performed by: INTERNAL MEDICINE

## 2021-08-27 PROCEDURE — 82330 ASSAY OF CALCIUM: CPT

## 2021-08-27 PROCEDURE — 78452 HT MUSCLE IMAGE SPECT MULT: CPT

## 2021-08-27 PROCEDURE — 6370000000 HC RX 637 (ALT 250 FOR IP): Performed by: NURSE PRACTITIONER

## 2021-08-27 PROCEDURE — 6360000002 HC RX W HCPCS: Performed by: NURSE PRACTITIONER

## 2021-08-27 PROCEDURE — 80048 BASIC METABOLIC PNL TOTAL CA: CPT

## 2021-08-27 PROCEDURE — A9500 TC99M SESTAMIBI: HCPCS | Performed by: RADIOLOGY

## 2021-08-27 PROCEDURE — 93017 CV STRESS TEST TRACING ONLY: CPT

## 2021-08-27 PROCEDURE — 6360000002 HC RX W HCPCS: Performed by: INTERNAL MEDICINE

## 2021-08-27 PROCEDURE — 36415 COLL VENOUS BLD VENIPUNCTURE: CPT

## 2021-08-27 PROCEDURE — 84703 CHORIONIC GONADOTROPIN ASSAY: CPT

## 2021-08-27 PROCEDURE — 82962 GLUCOSE BLOOD TEST: CPT

## 2021-08-27 PROCEDURE — 93016 CV STRESS TEST SUPVJ ONLY: CPT | Performed by: INTERNAL MEDICINE

## 2021-08-27 PROCEDURE — 2580000003 HC RX 258: Performed by: INTERNAL MEDICINE

## 2021-08-27 PROCEDURE — 78452 HT MUSCLE IMAGE SPECT MULT: CPT | Performed by: INTERNAL MEDICINE

## 2021-08-27 PROCEDURE — APPSS45 APP SPLIT SHARED TIME 31-45 MINUTES: Performed by: NURSE PRACTITIONER

## 2021-08-27 PROCEDURE — 99239 HOSP IP/OBS DSCHRG MGMT >30: CPT | Performed by: INTERNAL MEDICINE

## 2021-08-27 PROCEDURE — 93018 CV STRESS TEST I&R ONLY: CPT | Performed by: INTERNAL MEDICINE

## 2021-08-27 PROCEDURE — 2580000003 HC RX 258: Performed by: NURSE PRACTITIONER

## 2021-08-27 RX ORDER — ERGOCALCIFEROL 1.25 MG/1
50000 CAPSULE ORAL WEEKLY
Qty: 5 CAPSULE | Refills: 0 | Status: ON HOLD | OUTPATIENT
Start: 2021-09-03 | End: 2022-04-15 | Stop reason: SDUPTHER

## 2021-08-27 RX ORDER — OXYCODONE HYDROCHLORIDE AND ACETAMINOPHEN 5; 325 MG/1; MG/1
1 TABLET ORAL EVERY 6 HOURS PRN
Qty: 12 TABLET | Refills: 0 | Status: SHIPPED | OUTPATIENT
Start: 2021-08-27 | End: 2021-08-30

## 2021-08-27 RX ORDER — ERGOCALCIFEROL 1.25 MG/1
50000 CAPSULE ORAL WEEKLY
Status: DISCONTINUED | OUTPATIENT
Start: 2021-08-27 | End: 2021-08-27 | Stop reason: HOSPADM

## 2021-08-27 RX ADMIN — CALCIUM GLUCONATE 1000 MG: 98 INJECTION, SOLUTION INTRAVENOUS at 16:21

## 2021-08-27 RX ADMIN — ERGOCALCIFEROL 50000 UNITS: 1.25 CAPSULE ORAL at 16:20

## 2021-08-27 RX ADMIN — SEVELAMER CARBONATE 800 MG: 800 TABLET, FILM COATED ORAL at 16:20

## 2021-08-27 RX ADMIN — Medication 30 MILLICURIE: at 14:16

## 2021-08-27 RX ADMIN — Medication 10 ML: at 00:28

## 2021-08-27 RX ADMIN — Medication 10 MILLICURIE: at 12:08

## 2021-08-27 RX ADMIN — INSULIN GLARGINE 7 UNITS: 100 INJECTION, SOLUTION SUBCUTANEOUS at 08:49

## 2021-08-27 RX ADMIN — Medication 10 ML: at 16:31

## 2021-08-27 RX ADMIN — REGADENOSON 0.4 MG: 0.08 INJECTION, SOLUTION INTRAVENOUS at 14:20

## 2021-08-27 RX ADMIN — METOCLOPRAMIDE 5 MG: 10 TABLET ORAL at 16:21

## 2021-08-27 RX ADMIN — OXYCODONE AND ACETAMINOPHEN 1 TABLET: 5; 325 TABLET ORAL at 16:21

## 2021-08-27 RX ADMIN — HEPARIN SODIUM 5000 UNITS: 5000 INJECTION INTRAVENOUS; SUBCUTANEOUS at 16:21

## 2021-08-27 ASSESSMENT — PAIN SCALES - GENERAL
PAINLEVEL_OUTOF10: 5
PAINLEVEL_OUTOF10: 0
PAINLEVEL_OUTOF10: 3
PAINLEVEL_OUTOF10: 7
PAINLEVEL_OUTOF10: 0

## 2021-08-27 ASSESSMENT — PAIN DESCRIPTION - LOCATION: LOCATION: ABDOMEN

## 2021-08-27 ASSESSMENT — PAIN DESCRIPTION - PAIN TYPE: TYPE: ACUTE PAIN

## 2021-08-27 NOTE — PLAN OF CARE
Problem: Falls - Risk of:  Goal: Will remain free from falls  Description: Will remain free from falls  8/27/2021 1546 by Pamela Huitron RN  Outcome: Met This Shift     Problem: Falls - Risk of:  Goal: Absence of physical injury  Description: Absence of physical injury  8/27/2021 1546 by Pamela Huitron RN  Outcome: Met This Shift

## 2021-08-27 NOTE — PROGRESS NOTES
Department of Internal Medicine  Nephrology Progress Note    Events reviewed. For stress test today. SUBJECTIVE:  We are following [address] [place] [address] for ESRD on PD. She reports no new complaints.     PHYSICAL EXAM:      Vitals:    VITALS:  /69   Pulse 90   Temp 97.9 °F (36.6 °C)   Resp (!) 160   Ht 5' 4\" (1.626 m)   Wt 154 lb 8.7 oz (70.1 kg)   SpO2 96%   BMI 26.53 kg/m²   24HR INTAKE/OUTPUT:      Intake/Output Summary (Last 24 hours) at 8/27/2021 1220  Last data filed at 8/27/2021 1053  Gross per 24 hour   Intake 2000 ml   Output 3287 ml   Net -1287 ml       PD Catheter Exam:  PD catheter exit site clean    Constitutional: Awake in no acute distress  HEENT:  Normocephalic, PERRL  Respiratory: Decreased breath sounds on the basis  Cardiovascular/Edema:  RRR, S1/S2  Gastrointestinal:  Soft, PD catheter exit site clean   Neurologic:  Nonfocal, AVELAR  Skin:  Warm, dry, no lesions  Other:  no edema     Scheduled Meds:   ARIPiprazole  5 mg Oral Nightly    carvedilol  6.25 mg Oral BID WC    hydrOXYzine  25 mg Oral Daily    insulin glargine  7 Units Subcutaneous Daily    levothyroxine  75 mcg Oral Daily    metoclopramide  5 mg Oral TID    mirtazapine  15 mg Oral Nightly    pantoprazole  40 mg Oral QAM AC    rOPINIRole  0.25 mg Oral Nightly    sevelamer  800 mg Oral TID WC    sodium chloride flush  5-40 mL IntraVENous 2 times per day    insulin lispro  0-6 Units Subcutaneous TID WC    insulin lispro  0-3 Units Subcutaneous Nightly    heparin (porcine)  5,000 Units Subcutaneous 3 times per day     Continuous Infusions:   sodium chloride      dextrose       PRN Meds:.hydrALAZINE, morphine, albuterol, sodium chloride flush, sodium chloride, polyethylene glycol, acetaminophen **OR** acetaminophen, glucose, dextrose, glucagon (rDNA), dextrose, prochlorperazine, regadenoson, oxyCODONE-acetaminophen    DATA:    CBC:   Lab Results   Component Value Date    WBC 6.4 08/26/2021    RBC 3.47 08/26/2021 HGB 10.6 08/26/2021    HCT 33.8 08/26/2021    MCV 97.4 08/26/2021    MCH 30.5 08/26/2021    MCHC 31.4 08/26/2021    RDW 16.1 08/26/2021     08/26/2021    MPV 11.0 08/26/2021     CMP:    Lab Results   Component Value Date     08/27/2021    K 4.5 08/27/2021    K 4.0 08/26/2021    CL 97 08/27/2021    CO2 29 08/27/2021    BUN 29 08/27/2021    CREATININE 7.7 08/27/2021    GFRAA 8 08/27/2021    LABGLOM 8 08/27/2021    GLUCOSE 342 08/27/2021    GLUCOSE 130 05/18/2012    PROT 5.3 08/25/2021    LABALBU 2.4 08/25/2021    LABALBU 4.1 05/18/2012    CALCIUM 7.1 08/27/2021    BILITOT <0.2 08/25/2021    ALKPHOS 257 08/25/2021    AST 32 08/25/2021    ALT 18 08/25/2021     Magnesium:    Lab Results   Component Value Date    MG 1.7 08/25/2021     Phosphorus:    Lab Results   Component Value Date    PHOS 4.4 08/11/2021     Radiology Review:      CTA pulmonary with iv contrast 8/25/2021   1. No pulmonary embolism. 2. Nonspecific dependent pulmonary opacities in the lower lobes, increased as   of the CT of the chest from approximately 3 hours earlier. Elverna Finer may   represent atelectasis or pneumonia. 3. Prominent ascites in the upper abdomen. 4. Mild chronic compression fracture deformities from the T3-T8 vertebral   bodies.  Old healed bilateral rib fractures.         CXR 8/25/2021   No acute process.               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 CTA showed no evidence of PE. Denies fever or chills. IMPRESSION/RECOMMENDATIONS:      1. ESRD on PD, to continue CCPD.   2. Hypocalcemia, secondary to

## 2021-08-27 NOTE — FLOWSHEET NOTE
ccpd completed without complications. Aseptic technique used. Effluent drained clear yellow. No signs of infection. 1287ml net UF    vss post treatment          08/27/21 1053   Vitals   /69   Temp 97.9 °F (36.6 °C)   Pulse 90   Resp (!) 160   SpO2 96 %   Peritoneal Dialysis Catheter Left lower abdomen   Placement Date/Time: 10/31/20 0704   Catheter Location: Left lower abdomen   Status Deaccessed   Dressing Status Clean;Dry; Intact   Dressing Gauze   Cycler   Ultrafiltration (UF) (mL) 1287 mL

## 2021-08-27 NOTE — DISCHARGE SUMMARY
[facility] Physician Discharge Summary       [place] MD    Schedule an appointment as soon as possible for a visit in 1 week  Hospital follow up      Activity level: As toleated    Diet: ADULT DIET; Regular; 4 carb choices (60 gm/meal)    Labs:None    Condition at discharge: Stable     Dispo:Home        Patient ID:  Karen Giron  [id number]  36 y.o.  1990    Admit date: 8/25/2021    Discharge date and time:  8/27/2021  5:07 PM    Admission Diagnoses: Principal Problem:    Atypical chest pain  Active Problems:    Uncontrolled type 1 diabetes mellitus with hyperglycemia, with long-term current use of insulin (HCC)    ESRD on peritoneal dialysis ([address]    Severe protein-calorie malnutrition ([address]    Hypothyroidism  Resolved Problems:    * No resolved hospital problems. *      Discharge Diagnoses: Principal Problem:    Atypical chest pain  Active Problems:    Uncontrolled type 1 diabetes mellitus with hyperglycemia, with long-term current use of insulin (HCC)    ESRD on peritoneal dialysis ([address]    Severe protein-calorie malnutrition ([address]    Hypothyroidism  Resolved Problems:    * No resolved hospital problems. *      Consults:  IP CONSULT TO NEPHROLOGY  IP CONSULT TO CARDIOLOGY    Procedures: [place] stress test 8/27    History of Present Illness: Patient is a 82-year-old female with past medical history significant for poorly controlled diabetes, multiple admissions for DKA, end-stage renal disease on peritoneal dialysis, cardiac arrest, CHF, gastroparesis, depression, hypothyroidism, hyperlipidemia, iron deficiency anemia. She presented to the emergency department with complaints of chest pain. She reports symptoms been present throughout the day today. She is also complaining of left lower extremity swelling. In the ED, she had negative ultrasounds of this extremity showing no evidence of DVT.   She also had a negative CTA with no evidence of PE, her troponin is elevated, but stable and is near her baseline. She was initially reported to have systolic blood pressure in the 80s, but it went up to 190s. She was diagnosed with hypertensive emergency and treated with nitroglycerin and hydralazine. Now her blood pressure is 707 systolic. Chest x-ray was negative. CTA showed bilateral haziness there was concern that she might have CHF. She had an echo 3 months ago that did not show any evidence of CHF. She was placed on a heparin drip at 1 point in the ED, prior to her negative chest CTA. Medicine was asked to admit for further work-up. Hospital Course: 35 yo female admitted as per above details. See outline below for additional details and issues addressed this admission:     1. Atypical chest pain--negative CTA for acute PE. Patient has risk factors for premature CAD ie Type I DM and ESRD. Of note, even though high sensitivity troponin is elevated the delta is wnl and number is lower than her prior values. Cardiology was asked to see patient and they had did Lexiscan stress test which was negative for ischemia and showed normal LV function. Home going Rx for Percocet 5/325 #12/NR. Patient discharged home in stable condition. 2. Asymmetric leg edema L>R--negative US for DVT. This is leg where she had significant SSTI earlier this year. 3. ESRD on PD-. Nephrology consulted and resumed treatment night of admission. 4. Type I DM--continue home regimen  5. Severe protein-calorie malnutrition-ONS. 6. Hypothryoidism--continue levothyroxine.     Discharge Exam:  Vitals:    08/27/21 0000 08/27/21 0845 08/27/21 1053 08/27/21 1621   BP: 109/62 115/69 115/69 (!) 97/52   Pulse: 86 90 90 101   Resp: 16 16 (!) 160 16   Temp: 97.7 °F (36.5 °C) 97.9 °F (36.6 °C) 97.9 °F (36.6 °C) 97.2 °F (36.2 °C)   TempSrc: Infrared Infrared  Skin   SpO2: 97% 96% 96% 99%   Weight: 154 lb 8.7 oz (70.1 kg)      Height:       General Appearance alert and in NAD  Skin: warm and dry  Head: normocephalic and atraumatic  Eyes: pupils equal, round, and reactive to light, extraocular eye movements intact, conjunctivae normal  Neck: neck supple and non tender without mass   Pulmonary/Chest: clear to auscultation bilaterally- no wheezes, rales or rhonchi, normal air movement, no respiratory distress  Cardiovascular: normal rate, normal S1 and S2 and no carotid bruits  Abdomen: soft, non-tender, non-distended, normal bowel sounds, no masses or organomegaly  Extremities: no cyanosis, no clubbing and no edema  Neurologic: no cranial nerve deficit and speech normal  I/O last 3 completed shifts: In: 0   Out: 1287   No intake/output data recorded. LABS:  Recent Labs     08/25/21  1101 08/25/21  1101 08/26/21  0629 08/26/21  1216 08/27/21  0615     --  137  --  137   K 2.9*  --  4.0  --  4.5   CL 96*  --  97*  --  97*   CO2 27  --  26  --  29   BUN 25*  --  27*  --  29*   CREATININE 7.2*  --  7.6*  --  7.7*   GLUCOSE 195*   < > 233* 329 342*   CALCIUM 7.8*  --  7.4*  --  7.1*    < > = values in this interval not displayed.        Recent Labs     08/25/21  1101 08/26/21  0629   WBC 9.5 6.4   RBC 3.58 3.47*   HGB 11.2* 10.6*   HCT 33.7* 33.8*   MCV 94.1 97.4   MCH 31.3 30.5   MCHC 33.2 31.4*   RDW 15.7* 16.1*    271   MPV 10.5 11.0         Troponin [[id number]] (Abnormal) Collected: 08/25/21 1259     Specimen: Blood Updated: 08/25/21 1339       Troponin, High Sensitivity 202 ng/L       Troponin [[id number]] (Abnormal) Collected: 08/25/21 1101     Specimen: Blood Updated: 08/25/21 1142       Troponin, High Sensitivity 200 ng/L                  Brain Natriuretic Peptide [[id number]] (Abnormal) Collected: 08/25/21 1101     Specimen: Blood Updated: 08/25/21 1142       Pro-BNP >70,000 pg/mL          Imaging:      CT CHEST WO CONTRAST    Result Date: 8/25/2021  EXAMINATION: CT OF THE CHEST WITHOUT CONTRAST 8/25/2021 2:30 pm TECHNIQUE: CT of the chest was performed without the administration of intravenous contrast. Multiplanar reformatted images are provided for review. Dose modulation, iterative reconstruction, and/or weight based adjustment of the mA/kV was utilized to reduce the radiation dose to as low as reasonably achievable. COMPARISON: None. HISTORY: ORDERING SYSTEM PROVIDED HISTORY: chest pain, leg swelling, r/o PE TECHNOLOGIST PROVIDED HISTORY: Reason for exam:->chest pain, leg swelling, r/o PE FINDINGS: This study was performed without intravenous contrast. Mediastinum: No pericardial effusion. No significant mediastinal adenopathy. Lungs/pleura: Gravity dependent changes and mild haziness at the lung bases. No pleural effusion. The central airways are patent. Upper Abdomen: Large ascites. Soft Tissues/Bones: Unremarkable. Gravity dependent changes and mild haziness at the posterior lung bases. Otherwise no acute process. Large ascites in the abdomen. XR CHEST 1 VIEW    Result Date: 8/25/2021  EXAMINATION: ONE XRAY VIEW OF THE CHEST 8/25/2021 10:54 am COMPARISON: None. HISTORY: ORDERING SYSTEM PROVIDED HISTORY: chest pain TECHNOLOGIST PROVIDED HISTORY: Reason for exam:->chest pain FINDINGS: The lungs are without acute focal process. There is no effusion or pneumothorax. The cardiomediastinal silhouette is without acute process. The osseous structures are without acute process. No acute process. CTA PULMONARY W CONTRAST    Result Date: 8/25/2021  EXAMINATION: CTA OF THE CHEST 8/25/2021 6:24 pm TECHNIQUE: CTA of the chest was performed after the administration of intravenous contrast.  Multiplanar reformatted images are provided for review. MIP images are provided for review. Dose modulation, iterative reconstruction, and/or weight based adjustment of the mA/kV was utilized to reduce the radiation dose to as low as reasonably achievable.  COMPARISON: CT of the chest without contrast, 08/25/2021 ( HISTORY: ORDERING SYSTEM PROVIDED HISTORY: left leg swelling, chest pain, r/o PE TECHNOLOGIST PROVIDED HISTORY: Reason for exam:->left leg swelling, chest pain, r/o PE Decision Support Exception - unselect if not a suspected or confirmed emergency medical condition->Emergency Medical Condition (MA) FINDINGS: Pulmonary Arteries: Pulmonary arteries are adequately opacified for evaluation. No evidence of intraluminal filling defect to suggest pulmonary embolism. Main pulmonary artery is normal in caliber. Mediastinum: Linear area of calcification in the left brachiocephalic vein may represent a calcified thrombus the heart is normal in size. The thoracic aorta is unremarkable. No lymphadenopathy seen in the chest. Lungs/pleura: Dependent pulmonary opacities in the lower lobes appears somewhat more prominent as of the unenhanced CT of the chest from approximately 3 hours earlier and may represent atelectasis or pneumonia. Upper Abdomen: Prominent ascites is seen in the upper abdomen. Soft Tissues/Bones: No acute fracture or bony destructive lesion. Multiple old, healed bilateral rib fractures. Mild chronic compression fracture deformities from T3-T8 vertebral bodies. 1. No pulmonary embolism. 2. Nonspecific dependent pulmonary opacities in the lower lobes, increased as of the CT of the chest from approximately 3 hours earlier. They may represent atelectasis or pneumonia. 3. Prominent ascites in the upper abdomen. 4. Mild chronic compression fracture deformities from the T3-T8 vertebral bodies. Old healed bilateral rib fractures. US DUP LOWER EXTREMITY LEFT RODO    Result Date: 8/25/2021  EXAMINATION: DUPLEX VENOUS ULTRASOUND OF THE LEFT LOWER EXTREMITY 8/25/2021 11:36 am TECHNIQUE: Duplex ultrasound using B-mode/gray scaled imaging and Doppler spectral analysis and color flow was obtained of the left lower extremity. COMPARISON: None.  HISTORY: ORDERING SYSTEM PROVIDED HISTORY: swelling TECHNOLOGIST PROVIDED HISTORY: Reason for exam:->swelling What reading provider will be dictating this exam?->CRC FINDINGS: The visualized veins of the left lower extremity are patent and free of echogenic thrombus. The veins demonstrate good compressibility with normal color flow study and spectral analysis. No evidence of DVT in the left lower extremity. Patient Instructions:   Current Discharge Medication List      START taking these medications    Details   vitamin D (ERGOCALCIFEROL) 1.25 MG (86317 UT) CAPS capsule Take 1 capsule by mouth once a week  Qty: 5 capsule, Refills: 0      oxyCODONE-acetaminophen (PERCOCET) 5-325 MG per tablet Take 1 tablet by mouth every 6 hours as needed for Pain for up to 3 days. Qty: 12 tablet, Refills: 0    Comments: Reduce doses taken as pain becomes manageable  Associated Diagnoses: Chest pain, unspecified type         CONTINUE these medications which have NOT CHANGED    Details   metoprolol tartrate (LOPRESSOR) 25 MG tablet Take 25 mg by mouth 2 times daily      bumetanide (BUMEX) 1 MG tablet Take 2 mg by mouth 2 times daily      pregabalin (LYRICA) 25 MG capsule Take 1 capsule by mouth daily for 30 days. Qty: 30 capsule, Refills: 0    Associated Diagnoses: Chronic neuropathic pain      insulin glargine (LANTUS) 100 UNIT/ML injection vial Inject 7 Units into the skin daily New change in dose and frequency  Qty: 1 vial, Refills: 3      sevelamer (RENVELA) 800 MG tablet Take 1 tablet by mouth 3 times daily (with meals) New lower dose  Qty: 90 tablet, Refills: 3      insulin lispro, 1 Unit Dial, (HUMALOG KWIKPEN) 100 UNIT/ML SOPN Inject 3 units with meals + sliding scale. MAX 30U/day  Qty: 10 pen, Refills: 3    Associated Diagnoses: Type 1 diabetes mellitus with other specified complication ([facility])      Insulin Pen Needle (BD PEN NEEDLE NAV U/F) 32G X 4 MM MISC Uses with insulin 4 times a day  Qty: 250 each, Refills: 5    Associated Diagnoses: Type 1 diabetes mellitus with other specified complication ([facility]      ! ! blood glucose monitor strips Freestyle Lite Strips. Checks 4 times/day before meals and at bedtime and as needed for symptoms of irregular blood glucose. Qty: 250 strip, Refills: 5    Associated Diagnoses: Type 1 diabetes mellitus with other specified complication (HCC)      folic acid (FOLVITE) 1 MG tablet Take 1 tablet by mouth daily  Qty: 30 tablet, Refills: 3      mirtazapine (REMERON) 15 MG tablet Take 15 mg by mouth nightly      hydrOXYzine (ATARAX) 25 MG tablet Take 25 mg by mouth daily      polyethylene glycol (GLYCOLAX) 17 g packet Take 17 g by mouth daily as needed for Constipation      ARIPiprazole (ABILIFY) 5 MG tablet Take 5 mg by mouth nightly      Glucagon, rDNA, (GLUCAGON EMERGENCY IJ) Inject as directed      glucose (GLUTOSE) 40 % GEL Take 15 g by mouth See Admin Instructions      pantoprazole (PROTONIX) 40 MG tablet Take 1 tablet by mouth every morning (before breakfast)  Qty: 30 tablet, Refills: 3      levothyroxine (SYNTHROID) 75 MCG tablet TAKE 1 TABLET BY MOUTH IN THE MORNING BEFORE BREAKFAST  Qty: 60 tablet, Refills: 0      epoetin nena-epbx (RETACRIT) 3000 UNIT/ML SOLN injection Inject 1 mL into the skin three times a week  Qty: 21.9 mL      rOPINIRole (REQUIP) 0.25 MG tablet Take 0.25 mg by mouth nightly      !! blood glucose test strips (CONTOUR NEXT TEST) strip 1 each by In Vitro route 5 times daily As needed. Qty: 150 each, Refills: 5    Associated Diagnoses: Type 1 diabetes mellitus with other specified complication ([address] Insulin pump in place      metoclopramide (REGLAN) 5 MG tablet Take 5 mg by mouth 3 times daily        ! ! - Potential duplicate medications found. Please discuss with provider.       STOP taking these medications       carvedilol (COREG) 6.25 MG tablet Comments:   Reason for Stopping:         mupirocin (BACTROBAN) 2 % ointment Comments:   Reason for Stopping:         albuterol (PROVENTIL) (2.5 MG/3ML) 0.083% nebulizer solution Comments:   Reason for Stopping:             Note that more than 30 minutes was spent in preparing discharge papers, discussing discharge with patient, medication review, etc.    NOTE: This report was transcribed using voice recognition software. Every effort was made to ensure accuracy; however, inadvertent computerized transcription errors may be present.      Signed:  Electronically signed by César Garcia MD on 8/27/2021 at 5:07 PM

## 2021-08-27 NOTE — PROGRESS NOTES
Pt alert/oriented x4, stable, discharge instructions explained, pt verbalizes understanding, pt received copies of discharge instruction, femoral IV removed, pressure held for 5 minutes, pressure tape applied over 4x4, telemetry removed, pt dressed, belongings packed, pt will be escorted home by family.

## 2021-08-27 NOTE — PROGRESS NOTES
Inpatient Cardiology Progress note     PATIENT IS BEING FOLLOWED FOR: Chest Pain     Nyla Ryder is a 36year old  female who follows with Dr. Winston Dewey. SUBJECTIVE: Denies chest pain and dyspnea. Denies orthopnea and PND. OBJECTIVE: No apparent distress     ROS:  Consist: Denies fevers, chills or night sweats  Heart: Denies chest pain, palpitations, lightheadedness, dizziness or syncope  Lungs: Denies SOB, cough, wheezing, orthopnea or PND  GI: Denies abdominal pain, vomiting or diarrhea    PHYSICAL EXAM:   /69   Pulse 90   Temp 97.9 °F (36.6 °C) (Infrared)   Resp 16   Ht 5' 4\" (1.626 m)   Wt 154 lb 8.7 oz (70.1 kg)   SpO2 96%   BMI 26.53 kg/m²    CONST: Well developed, well nourished middle aged female who appears stated age. Awake, alert and cooperative. No apparent distress  HEENT:   Head- Normocephalic, atraumatic   Eyes- Conjunctivae pink, anicteric  Throat- Oral mucosa pink and moist  Neck-  No stridor, trachea midline, no jugular venous distention. No carotid bruit  CHEST: Chest symmetrical and non-tender to palpation. No accessory muscle use or intercostal retractions  RESPIRATORY:  Lung sounds - clear throughout fields   CARDIOVASCULAR:     Heart Inspection- shows no noted pulsations  Heart Palpation- no heaves or thrills; PMI is non-displaced   Heart Ausculation- Regular rate and rhythm, no murmur. No s3, s4 or rub   PV: No lower extremity edema. No varicosities. Pedal pulses palpable, no clubbing or cyanosis. Noted scabbed area to left shin without active drainage or bleeding. ABDOMEN: Soft, non-tender to light palpation. Bowel sounds present. No palpable masses no organomegaly; no abdominal bruit  MS: Good muscle strength and tone. No atrophy or abnormal movements. : Deferred  SKIN: Warm and dry no statis dermatitis or ulcers   NEURO / PSYCH: Oriented to person, place and time. Speech clear and appropriate. Follows all commands.  [place]le[place] affect Intake/Output Summary (Last 24 hours) at 8/27/2021 1031  Last data filed at 8/27/2021 0955  Gross per 24 hour   Intake 2000 ml   Output 2000 ml   Net 0 ml       Weight:   Wt Readings from Last 3 Encounters:   08/27/21 154 lb 8.7 oz (70.1 kg)   08/16/21 153 lb (69.4 kg)   08/10/21 154 lb 12.2 oz (70.2 kg)     Current Inpatient Medications:   ARIPiprazole  5 mg Oral Nightly    carvedilol  6.25 mg Oral BID WC    hydrOXYzine  25 mg Oral Daily    insulin glargine  7 Units Subcutaneous Daily    levothyroxine  75 mcg Oral Daily    metoclopramide  5 mg Oral TID    mirtazapine  15 mg Oral Nightly    pantoprazole  40 mg Oral QAM AC    rOPINIRole  0.25 mg Oral Nightly    sevelamer  800 mg Oral TID WC    sodium chloride flush  5-40 mL IntraVENous 2 times per day    insulin lispro  0-6 Units Subcutaneous TID WC    insulin lispro  0-3 Units Subcutaneous Nightly    heparin (porcine)  5,000 Units Subcutaneous 3 times per day       IV Infusions (if any):   sodium chloride      dextrose         DIAGNOSTIC/ LABORATORY DATA:  Labs:   CBC:   Recent Labs     08/25/21  1101 08/26/21  0629   WBC 9.5 6.4   HGB 11.2* 10.6*   HCT 33.7* 33.8*    271     BMP:   Recent Labs     08/26/21  0629 08/27/21  0615    137   K 4.0 4.5   CO2 26 29   BUN 27* 29*   CREATININE 7.6* 7.7*   LABGLOM 8 8   CALCIUM 7.4* 7.1*     Mag:   Recent Labs     08/25/21  1101   MG 1.7     TFT:   Lab Results   Component Value Date    TSH 11.530 (H) 07/12/2021    S3FKJWA 36.73 (L) 07/20/2020    G1AFWLF 8.6 11/05/2015    FT3 1.3 (L) 10/31/2020    T4FREE 1.08 02/15/2021      HgA1c:   Lab Results   Component Value Date    LABA1C 8.8 (H) 07/11/2021   APTT:  Recent Labs     08/25/21  1750   APTT 26.3     CARDIAC ENZYMES:  Recent Labs     08/25/21  1101 08/25/21  1259   TROPHS 200* 202*     FASTING LIPID PANEL:  Lab Results   Component Value Date    CHOL 95 01/14/2020    HDL 33 01/14/2020    LDLCALC 46 01/14/2020    TRIG 82 01/14/2020     LIVER PROFILE:  Recent Labs     08/25/21  1101   AST 32*   ALT 18   LABALBU 2.4*     05/14/2021 Limited Echocardiogram:   Normal left ventricular chamber size and systolic function, LVEF is 60-65%. Normal right ventricle size and function. Normal aortic root size. No evidence of pericardial effusion. No intra cardiac mass or thrombus. Compared to prior echo from 2/22/21, no significant changes noted. 08/25/2021 CT Chest without contrast:   Gravity dependent changes and mild haziness at the posterior lung bases. Otherwise no acute process. Large ascites in the abdomen. 08/25/2021 Pulmonary CTA:   1. No pulmonary embolism. 2. Nonspecific dependent pulmonary opacities in the lower lobes, increased as of the CT of the chest from approximately 3 hours earlier. Karron Fadumo may represent atelectasis or pneumonia. 3. Prominent ascites in the upper abdomen. 4. Mild chronic compression fracture deformities from the T3-T8 vertebral bodies.  Old healed bilateral rib fractures. 08/25/2021 CXR:   No acute process. 08/25/2021 LLE Ultrasound:   No evidence of DVT in the left lower extremity. Telemetry: SR with HR now 87  12 lead EKG: NA      ASSESSMENT:   1. Atypical chest pain with no EKG changes and elevated troponin (pattern not consistent with ACS) in the setting of ESRD. PE ruled out by CTA of the chest.  2. HTN uncontrolled on admission questionably in the setting of pain: Now controlled  3. Prolonged QTC  4. 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 ESRD, on peritoneal dialysis  6. DM type I with frequent admissions for DKA   7. Tobacco abuse  8. Hx illicit drug use  9. Anemia of chronic disease  10. Anxiety and depression  11. Hypocalcemia  12. Hypothyroidism, on replacement therapy       PLAN:  1. Lexiscan MPS today  2. Further recommendations, pending test results and patient's clinical course  3.  Will discuss with Dr. Bonnie Orellana     Electronically signed by HEBER Dean CNP on 8/27/2021 at 10:31 AM  [facility] Cardiology progress note  Genevie Body     I have personally participated in a face-to-face and personally obtained history and performed physical exam on the date of service. I reviewed chart, vitals, labs and radiologic studies. I also participated in medical decision making with HEBER Dean CNP on the date of service All of the assessments and recommendations are from me and I agree with all of the pertinent clinical information, assessment and treatment plan. I have reviewed and edited the note above based on my findings during my history, exam, and decision making. Please see my additional contributions to the history, physical exam, assessment, and recommendations below. Patient is seen in follow-up for chest pain    Subjective:     Ms. Shasta Hodgkins denies any chest pain or shortness of breath today  Laying in bed no apparent distress    Review of systems:  Reviewed, as above    Medication side effects: none    Scheduled Meds: Reviewed, as above  Continuous Infusions: Reviewed, as above. PRN Meds: Reviewed, as above. No intake/output data recorded. No intake/output data recorded. Objective:      Physical Exam:   BP (!) 97/52   Pulse 101   Temp 97.2 °F (36.2 °C) (Skin)   Resp 16   Ht 5' 4\" (1.626 m)   Wt 154 lb 8.7 oz (70.1 kg)   SpO2 99%   BMI 26.53 kg/m²   CONSTITUTIONAL:  awake, alert, cooperative, no apparent distress, and appears stated age  HEAD:  normocepalic, without obvious abnormality, atraumatic  NECK:  Supple, symmetrical, trachea midline, no adenopathy, thyroid symmetric, not enlarged and no tenderness, skin normal  LUNGS:  No increased work of breathing, good air exchange, clear to auscultation bilaterally, no crackles or wheezing  CARDIOVASCULAR:  Normal apical impulse, regular rate and rhythm, normal S1 and S2, no S3 or S4, and no murmur noted, no edema, no JVD, no carotid bruit.   ABDOMEN:  Soft, nontender, no masses, no hepatomegaly, no splenomegaly, BS+  MUSCULOSKELETAL:  No clubbing no cyanosis. there is no redness, warmth, or swelling of the joints  full range of motion noted  NEUROLOGIC:  Alert, awake,oriented x3  SKIN:  no bruising or bleeding, normal skin color, texture, turgor and no redness, warmth, or swelling      Cardiographics  I personally reviewed the telemetry monitor strips with the following interpretation:  Echocardiogram: Reviewed, as above. Imaging  Reviewed, as above. Reviewed, as above. Lab Review   Lab Results   Component Value Date     08/27/2021    K 4.5 08/27/2021    K 4.0 08/26/2021    CL 97 08/27/2021    CO2 29 08/27/2021    BUN 29 08/27/2021    CREATININE 7.7 08/27/2021    GLUCOSE 342 08/27/2021    GLUCOSE 130 05/18/2012    CALCIUM 7.1 08/27/2021     Lab Results   Component Value Date    WBC 6.4 08/26/2021    HGB 10.6 08/26/2021    HCT 33.8 08/26/2021    MCV 97.4 08/26/2021     08/26/2021     Reviewed, as above.   I have personally reviewed the laboratory, cardiac diagnostic and radiographic testing as outlined above:    Assessment:       Patient Active Problem List    Diagnosis Date Noted    Atypical chest pain 08/26/2021    Acute on chronic systolic CHF (congestive heart failure) ([address] 08/25/2021    Early satiety     Lactic acid acidosis 07/10/2021    Type 1 diabetes mellitus without complication ([address] 14/96/2108    Hyperosmolality 06/22/2021    Major depressive disorder 06/22/2021    Hypomagnesemia 05/14/2021    Hypocalcemia 05/14/2021    Intractable nausea and vomiting 05/14/2021    Wound of left leg, sequela 05/14/2021    Syncope 05/14/2021    Hyperkalemia 12/26/2020    Seizure ([address] 11/20/2020    Endocarditis 10/31/2020    Chronic right-sided low back pain     Nondisplaced fracture of neck of fifth metacarpal bone, left hand, initial encounter for closed fracture     Acute cystitis 10/10/2020    Uncontrolled type 1 diabetes mellitus with hyperglycemia, with long-term current use of insulin ([facility] [address] 10/08/2020    End stage renal disease ([facility] 10/08/2020    Hypothyroidism 10/08/2020    Hyperlipidemia 10/08/2020    Severe protein-calorie malnutrition ([facility] [address] 02/11/2020    Hypokalemia     Elevated troponin     Bladder dysfunction     Sinus tachycardia     Iron deficiency anemia 10/01/2019    Prolonged QT interval 08/17/2018    Hypertension 07/17/2018    Diabetic ketoacidosis without coma associated with type 1 diabetes mellitus ([facility] [address] 03/18/2018    MTHFR mutation 07/19/2016    Tobacco smoking complicating pregnancy 54/43/9345    Non compliance w medication regimen 03/30/2016       Recommendations: For stress test today  Continue current treatment  Further cardiac recommendations will be forthcoming pending her clinical course and the results of her Lexiscan stress test      Electronically signed by Allyson Mckeon MD on 9/6/2021 at 9:44 PM  NOTE: This report was transcribed using voice recognition software.  Every effort was made to ensure accuracy; however, inadvertent computerized transcription errors may be present   Late entry note

## 2021-08-27 NOTE — PROGRESS NOTES
Procedure: Meeta Prow stress test     Ordering physician: Luiza Pak  Referring MAURICEDTY:[id number] Loise Bridegroom    Indication: Chest Pain     Pretest evaluation no chest pain, no shortness of breath    Resting EKG showed: sinus rhythm     Protocol: Patient was given 0.4mg of Lexiscan followed by Cardiolite injection    Heart rate response:   Resting heart rate: 98 BPM   Stress heart rate: 104 BPM     Blood pressure response:   Resting blood pressure:143/98 mmHg   Stress blood pressure: 108/59 mmHg     Symptoms and signs:feeling weird.      EKG changes:  Resting EKG: nonischemic   Stress EKG : nonischemic     Impression:   Clinical: nonischemic   EKG: nonischemic     Cardiolite injected and nuclear images are pending

## 2021-08-27 NOTE — CARE COORDINATION
SS NOTE: COVID NEGATIVE 8/7. Per nursing report pt having chest pain. Pt resides with her mother, Best who is an RN and primary decision maker for pt. Pt is a peritoneal dialysis pt with [facility] and cycles her PD at night. Pt plans on returning home at dch. HANNAH Benitez.8/27/2021.10:15AM.

## 2021-08-27 NOTE — PROGRESS NOTES
Pt has c/o menstrual cramping and left shoulder pain. Dr. Jassi Dean ordered Percocet 5-325mg PO Q4H PRN.

## 2021-08-30 ENCOUNTER — TELEPHONE (OUTPATIENT)
Dept: INTERNAL MEDICINE | Age: 31
End: 2021-08-30

## 2021-08-30 NOTE — TELEPHONE ENCOUNTER
[facility] Initial Follow Up Call    Outreach made within 2 business days of discharge: Yes    Patient: Beata Ferraro Patient : 1990   MRN: [id number]  Reason for Admission: There are no discharge diagnoses documented for the most recent discharge. Discharge Date: 21       Spoke with: Montanadavidmother    Discharge department/facility: [facility] Interactive Patient Contact:  Was patient able to fill all prescriptions: Yes  Was patient instructed to bring all medications to the follow-up visit: Yes  Is patient taking all medications as directed in the discharge summary? Yes  Does patient understand their discharge instructions: Yes  Does patient have questions or concerns that need addressed prior to 7-14 day follow up office visit: no    Scheduled appointment with PCP within 7-14 days  Wilton will call for F/U herself.      Follow Up  Future Appointments   Date Time Provider Emiliana De Los Santos   10/13/2021 10:00 AM Zeke Guillen MD [facility]       Daphene Rape

## 2021-09-06 PROBLEM — N18.6 END STAGE RENAL DISEASE (HCC): Status: ACTIVE | Noted: 2020-10-08

## 2021-09-06 PROBLEM — E10.9 TYPE 1 DIABETES MELLITUS WITHOUT COMPLICATION (HCC): Status: ACTIVE | Noted: 2021-06-22

## 2021-09-07 ENCOUNTER — HOSPITAL ENCOUNTER (INPATIENT)
Age: 31
LOS: 7 days | Discharge: HOME | DRG: 425 | End: 2021-09-15
Attending: EMERGENCY MEDICINE | Admitting: INTERNAL MEDICINE
Payer: COMMERCIAL

## 2021-09-07 DIAGNOSIS — E87.5 HYPERKALEMIA: Primary | ICD-10-CM

## 2021-09-07 DIAGNOSIS — R73.9 HYPERGLYCEMIA: ICD-10-CM

## 2021-09-07 DIAGNOSIS — E10.65 UNCONTROLLED TYPE 1 DIABETES MELLITUS WITH HYPERGLYCEMIA (HCC): ICD-10-CM

## 2021-09-07 DIAGNOSIS — I82.890 JUGULAR VEIN THROMBOSIS: ICD-10-CM

## 2021-09-07 LAB
ALBUMIN SERPL-MCNC: 2.5 G/DL (ref 3.5–5.2)
ALP BLD-CCNC: 251 U/L (ref 35–104)
ALT SERPL-CCNC: 41 U/L (ref 0–32)
ANION GAP SERPL CALCULATED.3IONS-SCNC: 15 MMOL/L (ref 7–16)
AST SERPL-CCNC: 80 U/L (ref 0–31)
B.E.: -4.4 MMOL/L (ref -3–3)
BASOPHILS ABSOLUTE: 0.14 E9/L (ref 0–0.2)
BASOPHILS RELATIVE PERCENT: 1.6 % (ref 0–2)
BILIRUB SERPL-MCNC: <0.2 MG/DL (ref 0–1.2)
BUN BLDV-MCNC: 43 MG/DL (ref 6–20)
CALCIUM SERPL-MCNC: 7.8 MG/DL (ref 8.6–10.2)
CHLORIDE BLD-SCNC: 85 MMOL/L (ref 98–107)
CO2: 22 MMOL/L (ref 22–29)
COHB: 7.1 % (ref 0–1.5)
CREAT SERPL-MCNC: 7.4 MG/DL (ref 0.5–1)
CRITICAL: ABNORMAL
DATE ANALYZED: ABNORMAL
DATE OF COLLECTION: ABNORMAL
EOSINOPHILS ABSOLUTE: 0.3 E9/L (ref 0.05–0.5)
EOSINOPHILS RELATIVE PERCENT: 3.4 % (ref 0–6)
GFR AFRICAN AMERICAN: 8
GFR NON-AFRICAN AMERICAN: 8 ML/MIN/1.73
GLUCOSE BLD-MCNC: 1158 MG/DL (ref 74–99)
HCO3: 21.3 MMOL/L (ref 22–26)
HCT VFR BLD CALC: 39 % (ref 34–48)
HEMOGLOBIN: 11.5 G/DL (ref 11.5–15.5)
HHB: 2 % (ref 0–5)
IMMATURE GRANULOCYTES #: 0.02 E9/L
IMMATURE GRANULOCYTES %: 0.2 % (ref 0–5)
LAB: ABNORMAL
LACTIC ACID: 1.7 MMOL/L (ref 0.5–2.2)
LIPASE: 15 U/L (ref 13–60)
LYMPHOCYTES ABSOLUTE: 1.23 E9/L (ref 1.5–4)
LYMPHOCYTES RELATIVE PERCENT: 13.9 % (ref 20–42)
Lab: ABNORMAL
MCH RBC QN AUTO: 30.9 PG (ref 26–35)
MCHC RBC AUTO-ENTMCNC: 29.5 % (ref 32–34.5)
MCV RBC AUTO: 104.8 FL (ref 80–99.9)
METER GLUCOSE: >500 MG/DL (ref 74–99)
METHB: 0.3 % (ref 0–1.5)
MODE: ABNORMAL
MONOCYTES ABSOLUTE: 0.32 E9/L (ref 0.1–0.95)
MONOCYTES RELATIVE PERCENT: 3.6 % (ref 2–12)
NEUTROPHILS ABSOLUTE: 6.84 E9/L (ref 1.8–7.3)
NEUTROPHILS RELATIVE PERCENT: 77.3 % (ref 43–80)
O2 CONTENT: 15.7 ML/DL
O2 SATURATION: 97.8 % (ref 92–98.5)
O2HB: 90.6 % (ref 94–97)
OPERATOR ID: 516
PATIENT TEMP: 37 C
PCO2: 41.7 MMHG (ref 35–45)
PDW BLD-RTO: 15.8 FL (ref 11.5–15)
PH BLOOD GAS: 7.33 (ref 7.35–7.45)
PLATELET # BLD: 225 E9/L (ref 130–450)
PMV BLD AUTO: 11.3 FL (ref 7–12)
PO2: 146.6 MMHG (ref 75–100)
POTASSIUM SERPL-SCNC: 7 MMOL/L (ref 3.5–5)
RBC # BLD: 3.72 E12/L (ref 3.5–5.5)
SODIUM BLD-SCNC: 122 MMOL/L (ref 132–146)
SOURCE, BLOOD GAS: ABNORMAL
THB: 12.1 G/DL (ref 11.5–16.5)
TIME ANALYZED: 2317
TOTAL PROTEIN: 5.7 G/DL (ref 6.4–8.3)
WBC # BLD: 8.9 E9/L (ref 4.5–11.5)

## 2021-09-07 PROCEDURE — 2580000003 HC RX 258: Performed by: STUDENT IN AN ORGANIZED HEALTH CARE EDUCATION/TRAINING PROGRAM

## 2021-09-07 PROCEDURE — 82805 BLOOD GASES W/O2 SATURATION: CPT

## 2021-09-07 PROCEDURE — 96374 THER/PROPH/DIAG INJ IV PUSH: CPT

## 2021-09-07 PROCEDURE — 36415 COLL VENOUS BLD VENIPUNCTURE: CPT

## 2021-09-07 PROCEDURE — 83690 ASSAY OF LIPASE: CPT

## 2021-09-07 PROCEDURE — 99285 EMERGENCY DEPT VISIT HI MDM: CPT

## 2021-09-07 PROCEDURE — 96375 TX/PRO/DX INJ NEW DRUG ADDON: CPT

## 2021-09-07 PROCEDURE — 93005 ELECTROCARDIOGRAM TRACING: CPT | Performed by: STUDENT IN AN ORGANIZED HEALTH CARE EDUCATION/TRAINING PROGRAM

## 2021-09-07 RX ORDER — ONDANSETRON 2 MG/ML
8 INJECTION INTRAMUSCULAR; INTRAVENOUS ONCE
Status: COMPLETED | OUTPATIENT
Start: 2021-09-07 | End: 2021-09-08

## 2021-09-07 RX ORDER — 0.9 % SODIUM CHLORIDE 0.9 %
1000 INTRAVENOUS SOLUTION INTRAVENOUS ONCE
Status: COMPLETED | OUTPATIENT
Start: 2021-09-07 | End: 2021-09-08

## 2021-09-07 RX ORDER — CALCIUM GLUCONATE 94 MG/ML
1000 INJECTION, SOLUTION INTRAVENOUS ONCE
Status: COMPLETED | OUTPATIENT
Start: 2021-09-07 | End: 2021-09-08

## 2021-09-07 RX ADMIN — SODIUM CHLORIDE 1000 ML: 9 INJECTION, SOLUTION INTRAVENOUS at 22:52

## 2021-09-07 ASSESSMENT — ENCOUNTER SYMPTOMS
CONSTIPATION: 0
DIARRHEA: 0
ABDOMINAL PAIN: 0
SORE THROAT: 0
COUGH: 0
VOMITING: 0
BACK PAIN: 0
NAUSEA: 1
SHORTNESS OF BREATH: 1

## 2021-09-07 ASSESSMENT — PAIN SCALES - GENERAL: PAINLEVEL_OUTOF10: 8

## 2021-09-07 ASSESSMENT — PAIN DESCRIPTION - ONSET: ONSET: ON-GOING

## 2021-09-07 ASSESSMENT — PAIN DESCRIPTION - FREQUENCY: FREQUENCY: INTERMITTENT

## 2021-09-07 ASSESSMENT — PAIN DESCRIPTION - LOCATION: LOCATION: GENERALIZED

## 2021-09-07 ASSESSMENT — PAIN DESCRIPTION - DESCRIPTORS: DESCRIPTORS: ACHING

## 2021-09-07 NOTE — Clinical Note
Patient Class: Inpatient [101]   REQUIRED: Diagnosis: Hyperkalemia [119016]   Estimated Length of Stay: Estimated stay of more than 2 midnights

## 2021-09-08 ENCOUNTER — APPOINTMENT (OUTPATIENT)
Dept: GENERAL RADIOLOGY | Age: 31
DRG: 425 | End: 2021-09-08
Payer: COMMERCIAL

## 2021-09-08 LAB
ANION GAP SERPL CALCULATED.3IONS-SCNC: 10 MMOL/L (ref 7–16)
ANION GAP SERPL CALCULATED.3IONS-SCNC: 10 MMOL/L (ref 7–16)
ANION GAP SERPL CALCULATED.3IONS-SCNC: 12 MMOL/L (ref 7–16)
ANION GAP SERPL CALCULATED.3IONS-SCNC: 13 MMOL/L (ref 7–16)
ANION GAP SERPL CALCULATED.3IONS-SCNC: 15 MMOL/L (ref 7–16)
BASOPHILS ABSOLUTE: 0.08 E9/L (ref 0–0.2)
BASOPHILS RELATIVE PERCENT: 1 % (ref 0–2)
BETA-HYDROXYBUTYRATE: 2.33 MMOL/L (ref 0.02–0.27)
BUN BLDV-MCNC: 35 MG/DL (ref 6–20)
BUN BLDV-MCNC: 36 MG/DL (ref 6–20)
BUN BLDV-MCNC: 39 MG/DL (ref 6–20)
BUN BLDV-MCNC: 40 MG/DL (ref 6–20)
BUN BLDV-MCNC: 44 MG/DL (ref 6–20)
CALCIUM SERPL-MCNC: 7.3 MG/DL (ref 8.6–10.2)
CALCIUM SERPL-MCNC: 7.5 MG/DL (ref 8.6–10.2)
CALCIUM SERPL-MCNC: 7.7 MG/DL (ref 8.6–10.2)
CALCIUM SERPL-MCNC: 7.7 MG/DL (ref 8.6–10.2)
CALCIUM SERPL-MCNC: 7.9 MG/DL (ref 8.6–10.2)
CHLORIDE BLD-SCNC: 93 MMOL/L (ref 98–107)
CHLORIDE BLD-SCNC: 95 MMOL/L (ref 98–107)
CHLORIDE BLD-SCNC: 96 MMOL/L (ref 98–107)
CHLORIDE BLD-SCNC: 96 MMOL/L (ref 98–107)
CHLORIDE BLD-SCNC: 97 MMOL/L (ref 98–107)
CHP ED QC CHECK: NORMAL
CHP ED QC CHECK: NORMAL
CO2: 21 MMOL/L (ref 22–29)
CO2: 22 MMOL/L (ref 22–29)
CO2: 23 MMOL/L (ref 22–29)
CO2: 23 MMOL/L (ref 22–29)
CO2: 24 MMOL/L (ref 22–29)
CREAT SERPL-MCNC: 6.2 MG/DL (ref 0.5–1)
CREAT SERPL-MCNC: 6.3 MG/DL (ref 0.5–1)
CREAT SERPL-MCNC: 6.3 MG/DL (ref 0.5–1)
CREAT SERPL-MCNC: 7 MG/DL (ref 0.5–1)
CREAT SERPL-MCNC: 7.6 MG/DL (ref 0.5–1)
EKG ATRIAL RATE: 100 BPM
EKG ATRIAL RATE: 108 BPM
EKG P AXIS: 34 DEGREES
EKG P AXIS: 53 DEGREES
EKG P-R INTERVAL: 136 MS
EKG P-R INTERVAL: 138 MS
EKG Q-T INTERVAL: 366 MS
EKG Q-T INTERVAL: 372 MS
EKG QRS DURATION: 92 MS
EKG QRS DURATION: 96 MS
EKG QTC CALCULATION (BAZETT): 479 MS
EKG QTC CALCULATION (BAZETT): 490 MS
EKG R AXIS: 38 DEGREES
EKG R AXIS: 39 DEGREES
EKG T AXIS: 39 DEGREES
EKG T AXIS: 42 DEGREES
EKG VENTRICULAR RATE: 100 BPM
EKG VENTRICULAR RATE: 108 BPM
EOSINOPHILS ABSOLUTE: 0.07 E9/L (ref 0.05–0.5)
EOSINOPHILS RELATIVE PERCENT: 0.9 % (ref 0–6)
GFR AFRICAN AMERICAN: 10
GFR AFRICAN AMERICAN: 8
GFR AFRICAN AMERICAN: 8
GFR AFRICAN AMERICAN: 9
GFR AFRICAN AMERICAN: 9
GFR NON-AFRICAN AMERICAN: 10 ML/MIN/1.73
GFR NON-AFRICAN AMERICAN: 8 ML/MIN/1.73
GFR NON-AFRICAN AMERICAN: 8 ML/MIN/1.73
GFR NON-AFRICAN AMERICAN: 9 ML/MIN/1.73
GFR NON-AFRICAN AMERICAN: 9 ML/MIN/1.73
GLUCOSE BLD-MCNC: 1087 MG/DL (ref 74–99)
GLUCOSE BLD-MCNC: 148 MG/DL (ref 74–99)
GLUCOSE BLD-MCNC: 221 MG/DL (ref 74–99)
GLUCOSE BLD-MCNC: 229 MG/DL (ref 74–99)
GLUCOSE BLD-MCNC: 253 MG/DL (ref 74–99)
GLUCOSE BLD-MCNC: 619 MG/DL (ref 74–99)
GLUCOSE BLD-MCNC: 801 MG/DL (ref 74–99)
GLUCOSE BLD-MCNC: NORMAL MG/DL
GLUCOSE BLD-MCNC: NORMAL MG/DL
HCT VFR BLD CALC: 31.1 % (ref 34–48)
HEMOGLOBIN: 9.6 G/DL (ref 11.5–15.5)
IMMATURE GRANULOCYTES #: 0.04 E9/L
IMMATURE GRANULOCYTES %: 0.5 % (ref 0–5)
LYMPHOCYTES ABSOLUTE: 1.1 E9/L (ref 1.5–4)
LYMPHOCYTES RELATIVE PERCENT: 14 % (ref 20–42)
MAGNESIUM: 1.8 MG/DL (ref 1.6–2.6)
MAGNESIUM: 1.9 MG/DL (ref 1.6–2.6)
MCH RBC QN AUTO: 30.7 PG (ref 26–35)
MCHC RBC AUTO-ENTMCNC: 30.9 % (ref 32–34.5)
MCV RBC AUTO: 99.4 FL (ref 80–99.9)
METER GLUCOSE: 120 MG/DL (ref 74–99)
METER GLUCOSE: 131 MG/DL (ref 74–99)
METER GLUCOSE: 176 MG/DL (ref 74–99)
METER GLUCOSE: 196 MG/DL (ref 74–99)
METER GLUCOSE: 254 MG/DL (ref 74–99)
METER GLUCOSE: 312 MG/DL (ref 74–99)
METER GLUCOSE: 323 MG/DL (ref 74–99)
METER GLUCOSE: 67 MG/DL (ref 74–99)
METER GLUCOSE: 80 MG/DL (ref 74–99)
METER GLUCOSE: 96 MG/DL (ref 74–99)
METER GLUCOSE: >500 MG/DL (ref 74–99)
MONOCYTES ABSOLUTE: 0.28 E9/L (ref 0.1–0.95)
MONOCYTES RELATIVE PERCENT: 3.6 % (ref 2–12)
NEUTROPHILS ABSOLUTE: 6.28 E9/L (ref 1.8–7.3)
NEUTROPHILS RELATIVE PERCENT: 80 % (ref 43–80)
PDW BLD-RTO: 15.3 FL (ref 11.5–15)
PHOSPHORUS: 5.1 MG/DL (ref 2.5–4.5)
PHOSPHORUS: 5.2 MG/DL (ref 2.5–4.5)
PHOSPHORUS: 5.2 MG/DL (ref 2.5–4.5)
PHOSPHORUS: 5.3 MG/DL (ref 2.5–4.5)
PHOSPHORUS: 5.7 MG/DL (ref 2.5–4.5)
PLATELET # BLD: 206 E9/L (ref 130–450)
PMV BLD AUTO: 10.7 FL (ref 7–12)
POTASSIUM SERPL-SCNC: 4.7 MMOL/L (ref 3.5–5)
POTASSIUM SERPL-SCNC: 5.1 MMOL/L (ref 3.5–5)
POTASSIUM SERPL-SCNC: 5.1 MMOL/L (ref 3.5–5)
POTASSIUM SERPL-SCNC: 5.2 MMOL/L (ref 3.5–5)
POTASSIUM SERPL-SCNC: 5.3 MMOL/L (ref 3.5–5)
POTASSIUM SERPL-SCNC: 5.7 MMOL/L (ref 3.5–5)
RBC # BLD: 3.13 E12/L (ref 3.5–5.5)
SARS-COV-2, NAAT: NOT DETECTED
SODIUM BLD-SCNC: 129 MMOL/L (ref 132–146)
SODIUM BLD-SCNC: 129 MMOL/L (ref 132–146)
SODIUM BLD-SCNC: 130 MMOL/L (ref 132–146)
SODIUM BLD-SCNC: 131 MMOL/L (ref 132–146)
SODIUM BLD-SCNC: 131 MMOL/L (ref 132–146)
WBC # BLD: 7.9 E9/L (ref 4.5–11.5)

## 2021-09-08 PROCEDURE — 6370000000 HC RX 637 (ALT 250 FOR IP): Performed by: INTERNAL MEDICINE

## 2021-09-08 PROCEDURE — 93005 ELECTROCARDIOGRAM TRACING: CPT

## 2021-09-08 PROCEDURE — 2580000003 HC RX 258

## 2021-09-08 PROCEDURE — 6360000002 HC RX W HCPCS: Performed by: INTERNAL MEDICINE

## 2021-09-08 PROCEDURE — 6360000002 HC RX W HCPCS

## 2021-09-08 PROCEDURE — 6370000000 HC RX 637 (ALT 250 FOR IP)

## 2021-09-08 PROCEDURE — 71045 X-RAY EXAM CHEST 1 VIEW: CPT

## 2021-09-08 PROCEDURE — 84132 ASSAY OF SERUM POTASSIUM: CPT

## 2021-09-08 PROCEDURE — 36415 COLL VENOUS BLD VENIPUNCTURE: CPT

## 2021-09-08 PROCEDURE — 93010 ELECTROCARDIOGRAM REPORT: CPT | Performed by: INTERNAL MEDICINE

## 2021-09-08 PROCEDURE — 6360000002 HC RX W HCPCS: Performed by: STUDENT IN AN ORGANIZED HEALTH CARE EDUCATION/TRAINING PROGRAM

## 2021-09-08 PROCEDURE — 1200000000 HC SEMI PRIVATE

## 2021-09-08 PROCEDURE — 82947 ASSAY GLUCOSE BLOOD QUANT: CPT

## 2021-09-08 PROCEDURE — 99223 1ST HOSP IP/OBS HIGH 75: CPT | Performed by: INTERNAL MEDICINE

## 2021-09-08 PROCEDURE — 90945 DIALYSIS ONE EVALUATION: CPT

## 2021-09-08 PROCEDURE — 80048 BASIC METABOLIC PNL TOTAL CA: CPT

## 2021-09-08 PROCEDURE — 2500000003 HC RX 250 WO HCPCS: Performed by: STUDENT IN AN ORGANIZED HEALTH CARE EDUCATION/TRAINING PROGRAM

## 2021-09-08 PROCEDURE — 85025 COMPLETE CBC W/AUTO DIFF WBC: CPT

## 2021-09-08 PROCEDURE — 84100 ASSAY OF PHOSPHORUS: CPT

## 2021-09-08 PROCEDURE — 6370000000 HC RX 637 (ALT 250 FOR IP): Performed by: STUDENT IN AN ORGANIZED HEALTH CARE EDUCATION/TRAINING PROGRAM

## 2021-09-08 PROCEDURE — 82962 GLUCOSE BLOOD TEST: CPT

## 2021-09-08 PROCEDURE — 83735 ASSAY OF MAGNESIUM: CPT

## 2021-09-08 PROCEDURE — 2580000003 HC RX 258: Performed by: STUDENT IN AN ORGANIZED HEALTH CARE EDUCATION/TRAINING PROGRAM

## 2021-09-08 RX ORDER — LEVOTHYROXINE SODIUM 0.07 MG/1
75 TABLET ORAL DAILY
Status: DISCONTINUED | OUTPATIENT
Start: 2021-09-08 | End: 2021-09-15 | Stop reason: HOSPADM

## 2021-09-08 RX ORDER — DEXTROSE MONOHYDRATE 25 G/50ML
12.5 INJECTION, SOLUTION INTRAVENOUS PRN
Status: DISCONTINUED | OUTPATIENT
Start: 2021-09-08 | End: 2021-09-15 | Stop reason: HOSPADM

## 2021-09-08 RX ORDER — MAGNESIUM SULFATE 1 G/100ML
1000 INJECTION INTRAVENOUS PRN
Status: DISCONTINUED | OUTPATIENT
Start: 2021-09-07 | End: 2021-09-08

## 2021-09-08 RX ORDER — SODIUM CHLORIDE 450 MG/100ML
INJECTION, SOLUTION INTRAVENOUS CONTINUOUS
Status: DISCONTINUED | OUTPATIENT
Start: 2021-09-08 | End: 2021-09-08

## 2021-09-08 RX ORDER — ONDANSETRON 2 MG/ML
4 INJECTION INTRAMUSCULAR; INTRAVENOUS EVERY 6 HOURS PRN
Status: DISCONTINUED | OUTPATIENT
Start: 2021-09-08 | End: 2021-09-15 | Stop reason: HOSPADM

## 2021-09-08 RX ORDER — HYDROMORPHONE HYDROCHLORIDE 1 MG/ML
0.25 INJECTION, SOLUTION INTRAMUSCULAR; INTRAVENOUS; SUBCUTANEOUS
Status: DISCONTINUED | OUTPATIENT
Start: 2021-09-08 | End: 2021-09-09

## 2021-09-08 RX ORDER — SEVELAMER CARBONATE 800 MG/1
800 TABLET, FILM COATED ORAL
Status: DISCONTINUED | OUTPATIENT
Start: 2021-09-08 | End: 2021-09-15 | Stop reason: HOSPADM

## 2021-09-08 RX ORDER — POLYETHYLENE GLYCOL 3350 17 G/17G
17 POWDER, FOR SOLUTION ORAL DAILY PRN
Status: DISCONTINUED | OUTPATIENT
Start: 2021-09-08 | End: 2021-09-15 | Stop reason: HOSPADM

## 2021-09-08 RX ORDER — ARIPIPRAZOLE 5 MG/1
5 TABLET ORAL NIGHTLY
Status: DISCONTINUED | OUTPATIENT
Start: 2021-09-08 | End: 2021-09-15 | Stop reason: HOSPADM

## 2021-09-08 RX ORDER — POTASSIUM CHLORIDE 7.45 MG/ML
10 INJECTION INTRAVENOUS PRN
Status: DISCONTINUED | OUTPATIENT
Start: 2021-09-07 | End: 2021-09-08

## 2021-09-08 RX ORDER — PANTOPRAZOLE SODIUM 40 MG/1
40 TABLET, DELAYED RELEASE ORAL
Status: DISCONTINUED | OUTPATIENT
Start: 2021-09-08 | End: 2021-09-15 | Stop reason: HOSPADM

## 2021-09-08 RX ORDER — ACETAMINOPHEN 650 MG/1
650 SUPPOSITORY RECTAL EVERY 6 HOURS PRN
Status: DISCONTINUED | OUTPATIENT
Start: 2021-09-08 | End: 2021-09-15 | Stop reason: HOSPADM

## 2021-09-08 RX ORDER — HYDROMORPHONE HYDROCHLORIDE 1 MG/ML
0.5 INJECTION, SOLUTION INTRAMUSCULAR; INTRAVENOUS; SUBCUTANEOUS
Status: DISCONTINUED | OUTPATIENT
Start: 2021-09-08 | End: 2021-09-09

## 2021-09-08 RX ORDER — SODIUM CHLORIDE 9 MG/ML
25 INJECTION, SOLUTION INTRAVENOUS PRN
Status: DISCONTINUED | OUTPATIENT
Start: 2021-09-08 | End: 2021-09-15 | Stop reason: HOSPADM

## 2021-09-08 RX ORDER — HYDRALAZINE HYDROCHLORIDE 20 MG/ML
10 INJECTION INTRAMUSCULAR; INTRAVENOUS EVERY 6 HOURS PRN
Status: DISCONTINUED | OUTPATIENT
Start: 2021-09-08 | End: 2021-09-15 | Stop reason: HOSPADM

## 2021-09-08 RX ORDER — SODIUM CHLORIDE 0.9 % (FLUSH) 0.9 %
5-40 SYRINGE (ML) INJECTION EVERY 12 HOURS SCHEDULED
Status: DISCONTINUED | OUTPATIENT
Start: 2021-09-08 | End: 2021-09-15 | Stop reason: HOSPADM

## 2021-09-08 RX ORDER — HEPARIN SODIUM 5000 [USP'U]/ML
5000 INJECTION, SOLUTION INTRAVENOUS; SUBCUTANEOUS EVERY 8 HOURS SCHEDULED
Status: DISCONTINUED | OUTPATIENT
Start: 2021-09-08 | End: 2021-09-15 | Stop reason: HOSPADM

## 2021-09-08 RX ORDER — FOLIC ACID 1 MG/1
1 TABLET ORAL DAILY
Status: DISCONTINUED | OUTPATIENT
Start: 2021-09-08 | End: 2021-09-15 | Stop reason: HOSPADM

## 2021-09-08 RX ORDER — DEXTROSE, SODIUM CHLORIDE, AND POTASSIUM CHLORIDE 5; .45; .15 G/100ML; G/100ML; G/100ML
INJECTION INTRAVENOUS CONTINUOUS PRN
Status: DISCONTINUED | OUTPATIENT
Start: 2021-09-07 | End: 2021-09-11

## 2021-09-08 RX ORDER — MIRTAZAPINE 15 MG/1
15 TABLET, FILM COATED ORAL NIGHTLY
Status: DISCONTINUED | OUTPATIENT
Start: 2021-09-08 | End: 2021-09-15 | Stop reason: HOSPADM

## 2021-09-08 RX ORDER — SODIUM CHLORIDE 0.9 % (FLUSH) 0.9 %
10 SYRINGE (ML) INJECTION PRN
Status: DISCONTINUED | OUTPATIENT
Start: 2021-09-08 | End: 2021-09-15 | Stop reason: HOSPADM

## 2021-09-08 RX ORDER — ONDANSETRON 4 MG/1
4 TABLET, ORALLY DISINTEGRATING ORAL EVERY 8 HOURS PRN
Status: DISCONTINUED | OUTPATIENT
Start: 2021-09-08 | End: 2021-09-15 | Stop reason: HOSPADM

## 2021-09-08 RX ORDER — ARIPIPRAZOLE 5 MG/1
5 TABLET ORAL NIGHTLY
Status: DISCONTINUED | OUTPATIENT
Start: 2021-09-08 | End: 2021-09-08

## 2021-09-08 RX ORDER — DEXTROSE MONOHYDRATE 25 G/50ML
12.5 INJECTION, SOLUTION INTRAVENOUS PRN
Status: DISCONTINUED | OUTPATIENT
Start: 2021-09-07 | End: 2021-09-15 | Stop reason: HOSPADM

## 2021-09-08 RX ORDER — INSULIN GLARGINE 100 [IU]/ML
10 INJECTION, SOLUTION SUBCUTANEOUS 2 TIMES DAILY
Status: DISCONTINUED | OUTPATIENT
Start: 2021-09-08 | End: 2021-09-15 | Stop reason: HOSPADM

## 2021-09-08 RX ORDER — ACETAMINOPHEN 325 MG/1
650 TABLET ORAL EVERY 6 HOURS PRN
Status: DISCONTINUED | OUTPATIENT
Start: 2021-09-08 | End: 2021-09-15 | Stop reason: HOSPADM

## 2021-09-08 RX ADMIN — METOPROLOL TARTRATE 25 MG: 25 TABLET, FILM COATED ORAL at 21:22

## 2021-09-08 RX ADMIN — ONDANSETRON 8 MG: 2 INJECTION INTRAMUSCULAR; INTRAVENOUS at 00:09

## 2021-09-08 RX ADMIN — DEXTROSE MONOHYDRATE 12.5 G: 25 INJECTION, SOLUTION INTRAVENOUS at 11:08

## 2021-09-08 RX ADMIN — MIRTAZAPINE 15 MG: 15 TABLET, FILM COATED ORAL at 21:22

## 2021-09-08 RX ADMIN — SODIUM CHLORIDE 0.1 UNITS/KG/HR: 9 INJECTION, SOLUTION INTRAVENOUS at 00:48

## 2021-09-08 RX ADMIN — FOLIC ACID 1 MG: 1 TABLET ORAL at 08:28

## 2021-09-08 RX ADMIN — SEVELAMER CARBONATE 800 MG: 800 TABLET, FILM COATED ORAL at 12:33

## 2021-09-08 RX ADMIN — HEPARIN SODIUM 5000 UNITS: 5000 INJECTION INTRAVENOUS; SUBCUTANEOUS at 05:46

## 2021-09-08 RX ADMIN — SODIUM CHLORIDE: 4.5 INJECTION, SOLUTION INTRAVENOUS at 05:45

## 2021-09-08 RX ADMIN — SODIUM CHLORIDE: 4.5 INJECTION, SOLUTION INTRAVENOUS at 01:34

## 2021-09-08 RX ADMIN — Medication 10 ML: at 08:28

## 2021-09-08 RX ADMIN — METOPROLOL TARTRATE 25 MG: 25 TABLET, FILM COATED ORAL at 08:28

## 2021-09-08 RX ADMIN — HEPARIN SODIUM 5000 UNITS: 5000 INJECTION INTRAVENOUS; SUBCUTANEOUS at 21:22

## 2021-09-08 RX ADMIN — INSULIN LISPRO 3 UNITS: 100 INJECTION, SOLUTION INTRAVENOUS; SUBCUTANEOUS at 19:11

## 2021-09-08 RX ADMIN — SEVELAMER CARBONATE 800 MG: 800 TABLET, FILM COATED ORAL at 08:28

## 2021-09-08 RX ADMIN — HYDROMORPHONE HYDROCHLORIDE 0.5 MG: 1 INJECTION, SOLUTION INTRAMUSCULAR; INTRAVENOUS; SUBCUTANEOUS at 21:23

## 2021-09-08 RX ADMIN — HYDROMORPHONE HYDROCHLORIDE 0.25 MG: 1 INJECTION, SOLUTION INTRAMUSCULAR; INTRAVENOUS; SUBCUTANEOUS at 13:48

## 2021-09-08 RX ADMIN — CALCIUM GLUCONATE 1000 MG: 98 INJECTION, SOLUTION INTRAVENOUS at 00:10

## 2021-09-08 RX ADMIN — SODIUM CHLORIDE 0.03 UNITS/KG/HR: 9 INJECTION, SOLUTION INTRAVENOUS at 06:13

## 2021-09-08 RX ADMIN — LEVOTHYROXINE SODIUM 75 MCG: 75 TABLET ORAL at 05:46

## 2021-09-08 RX ADMIN — HYDRALAZINE HYDROCHLORIDE 10 MG: 20 INJECTION INTRAMUSCULAR; INTRAVENOUS at 11:52

## 2021-09-08 RX ADMIN — SODIUM CHLORIDE 0.01 UNITS/KG/HR: 9 INJECTION, SOLUTION INTRAVENOUS at 07:12

## 2021-09-08 RX ADMIN — SEVELAMER CARBONATE 800 MG: 800 TABLET, FILM COATED ORAL at 19:04

## 2021-09-08 RX ADMIN — POTASSIUM CHLORIDE, DEXTROSE MONOHYDRATE AND SODIUM CHLORIDE: 150; 5; 450 INJECTION, SOLUTION INTRAVENOUS at 08:27

## 2021-09-08 RX ADMIN — POTASSIUM CHLORIDE, DEXTROSE MONOHYDRATE AND SODIUM CHLORIDE: 150; 5; 450 INJECTION, SOLUTION INTRAVENOUS at 22:52

## 2021-09-08 RX ADMIN — INSULIN GLARGINE 10 UNITS: 100 INJECTION, SOLUTION SUBCUTANEOUS at 21:21

## 2021-09-08 RX ADMIN — INSULIN LISPRO 2 UNITS: 100 INJECTION, SOLUTION INTRAVENOUS; SUBCUTANEOUS at 21:21

## 2021-09-08 RX ADMIN — HYDROMORPHONE HYDROCHLORIDE 0.25 MG: 1 INJECTION, SOLUTION INTRAMUSCULAR; INTRAVENOUS; SUBCUTANEOUS at 10:25

## 2021-09-08 RX ADMIN — POTASSIUM CHLORIDE, DEXTROSE MONOHYDRATE AND SODIUM CHLORIDE: 150; 5; 450 INJECTION, SOLUTION INTRAVENOUS at 14:01

## 2021-09-08 RX ADMIN — METOPROLOL TARTRATE 25 MG: 25 TABLET, FILM COATED ORAL at 03:20

## 2021-09-08 RX ADMIN — INSULIN HUMAN 10 UNITS: 100 INJECTION, SOLUTION PARENTERAL at 00:47

## 2021-09-08 RX ADMIN — HEPARIN SODIUM 5000 UNITS: 5000 INJECTION INTRAVENOUS; SUBCUTANEOUS at 13:48

## 2021-09-08 RX ADMIN — INSULIN GLARGINE 10 UNITS: 100 INJECTION, SOLUTION SUBCUTANEOUS at 10:22

## 2021-09-08 RX ADMIN — PANTOPRAZOLE SODIUM 40 MG: 40 TABLET, DELAYED RELEASE ORAL at 07:12

## 2021-09-08 ASSESSMENT — PAIN SCALES - GENERAL
PAINLEVEL_OUTOF10: 6
PAINLEVEL_OUTOF10: 6
PAINLEVEL_OUTOF10: 4
PAINLEVEL_OUTOF10: 0
PAINLEVEL_OUTOF10: 0
PAINLEVEL_OUTOF10: 8
PAINLEVEL_OUTOF10: 0
PAINLEVEL_OUTOF10: 6
PAINLEVEL_OUTOF10: 0
PAINLEVEL_OUTOF10: 0
PAINLEVEL_OUTOF10: 6
PAINLEVEL_OUTOF10: 6
PAINLEVEL_OUTOF10: 8

## 2021-09-08 ASSESSMENT — PAIN DESCRIPTION - LOCATION
LOCATION: GENERALIZED
LOCATION: GENERALIZED;HEAD

## 2021-09-08 ASSESSMENT — PAIN DESCRIPTION - PAIN TYPE: TYPE: ACUTE PAIN

## 2021-09-08 NOTE — ED TRIAGE NOTES
FIRST PROVIDER CONTACT ASSESSMENT NOTE      Department of Emergency Medicine   Admit Date: No admission date for patient encounter. Chief Complaint: Hyperglycemia (started yesterday)      History of Present Illness:    Sharri Peguero is a 36 y.o. female who presents to the ED for hyperglycemia and body aches and nausea x 1 day. Denies any other symptoms today.   Hx type 1 diabetes with history of dka in the past. She did take 10 units of Humalog prior to arrival.        -----------------END OF FIRST PROVIDER CONTACT ASSESSMENT NOTE--------------  Electronically signed by HEBER Yao CNP   DD: 9/7/21

## 2021-09-08 NOTE — CONSULTS
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 Upon arrival to ER her blood glucose was found to be 1087 and her potassium 7. Patient was started on insulin infusion and emergent PD initiated in ER. We are consulted for hyperkalemia and ESRD on PD.       Past Medical History:        Diagnosis Date    Acute congestive heart failure ([address]     BC (acute kidney injury) ([address] 10/01/2019    Cardiac arrest ([address] 02/15/2021    Cephalgia 10/09/2019    Chronic kidney disease     Depression     Diabetes mellitus ([address]     Diabetic gastroparesis associated with type 1 diabetes mellitus ([address] 12/17/2018    Diabetic ketoacidosis ([address] 08/27/2011    Diabetic ketoacidosis with coma associated with type 1 diabetes mellitus ([address] 06/26/2013    Diabetic polyneuropathy associated with type 1 diabetes mellitus ([address] 12/27/2020    Drug use complicating pregnancy in third trimester     Endocarditis 10/31/2020    ESRD (end stage renal disease) ([address] 09/29/2020    H/O cardiovascular stress test 08/27/2021    Lexiscan    [facility])     History of blood transfusion 11/2019    Hyperlipidemia 10/08/2020    Hyperosmolar hyperglycemic state (HHS) ([address] 11/20/2020    Hypothyroidism 10/08/2020    Iron deficiency anemia 10/01/2019    MDRO (multiple drug resistant organisms) resistance     MRSA (methicillin resistant Staphylococcus aureus)     back wound abcess    Non compliance w medication regimen 2016    Other disorders of kidney and ureter     Pregnancy 2016    16 weeks    Previous  delivery affecting pregnancy, antepartum 2017    Previous stillbirth or demise, antepartum 2016    Seizure ([address] 2020    Severe pre-eclampsia in third trimester 2016    Shock liver 02/15/2021    Valvular endocarditis 11/10/2020    This Diagnosis was added to the Problem List based on transcribed orders from Dr. Savita Simmons        Past Surgical History:        Procedure Laterality Date    BACK SURGERY      abscess   [address] [facility]      x2    CHOLECYSTECTOMY, LAPAROSCOPIC N/A 2019    CHOLECYSTECTOMY LAPAROSCOPIC performed by Chantelle Rivero MD at [address] [place] [address] N/A 2018    COLONOSCOPY WITH BIOPSY performed by Tereza Ortega MD at [id number] [place] [address] COLONOSCOPY N/A 2018    COLONOSCOPY WITH BIOPSY performed by Pat Self MD at [address]  2012    EF 57%    ECHO COMPL W DOP COLOR FLOW  6/10/2013         EMBOLECTOMY N/A 2020    [address] [place] [address], [address] [place] [address], YULISSA -- REQS ROOM 3 performed by Mylene Fletcher MD at [address] [place] [address] Left 2021    LEFT LEG INCISION AND DRAINAGE, DEBRIDEMENT, WOUND VAC APPLICATION performed by Leonard Juárez DPM at [address] [place] [address] Left 2021    LEFT LEG DEBRIDEMENT BIOPSY POSS APPLICATION WOUND VAC performed by Leonard Juárez DPM at [place] [address] N/A 2020    LAPAROSCOPIC INSERTION PERITONEAL DIALYSIS CATHETER performed by Dima Rivers MD at [facility] ESOPHAGOGASTRODUODENOSCOPY TRANSORAL DIAGNOSTIC N/A 2018    EGD ESOPHAGOGASTRODUODENOSCOPY performed by Tereza Ortega MD at [address] [place] [address] TRANSESOPHAGEAL ECHOCARDIOGRAM N/A 10/19/2020 TRANSESOPHAGEAL ECHOCARDIOGRAM WITH BUBBLE STUDY performed by Jase Torres MD at [address] [facility] TUNNELED VENOUS PORT PLACEMENT  04/2018    UPPER GASTROINTESTINAL ENDOSCOPY  12/18/2018    EGD BIOPSY performed by Lkashmi Da Silva MD at [facility] N/A 10/11/2019    EGD ESOPHAGOGASTRODUODENOSCOPY performed by Yoni Hurd DO at [facility] N/A 7/14/2021    EGD BIOPSY performed by Naeem Arreguin MD at [place] [address]     Current Medications:    Current Facility-Administered Medications: dextrose 5 % and 0.45 % NaCl with KCl 20 mEq infusion, , IntraVENous, Continuous PRN  dextrose 50 % IV solution, 12.5 g, IntraVENous, PRN  sodium chloride flush 0.9 % injection 5-40 mL, 5-40 mL, IntraVENous, 2 times per day  sodium chloride flush 0.9 % injection 10 mL, 10 mL, IntraVENous, PRN  0.9 % sodium chloride infusion, 25 mL, IntraVENous, PRN  heparin (porcine) injection 5,000 Units, 5,000 Units, SubCUTAneous, 3 times per day  ondansetron (ZOFRAN-ODT) disintegrating tablet 4 mg, 4 mg, Oral, Q8H PRN **OR** ondansetron (ZOFRAN) injection 4 mg, 4 mg, IntraVENous, Q6H PRN  polyethylene glycol (GLYCOLAX) packet 17 g, 17 g, Oral, Daily PRN  acetaminophen (TYLENOL) tablet 650 mg, 650 mg, Oral, Q6H PRN **OR** acetaminophen (TYLENOL) suppository 650 mg, 650 mg, Rectal, Q6H PRN  pantoprazole (PROTONIX) tablet 40 mg, 40 mg, Oral, QAM AC  folic acid (FOLVITE) tablet 1 mg, 1 mg, Oral, Daily  levothyroxine (SYNTHROID) tablet 75 mcg, 75 mcg, Oral, Daily  metoprolol tartrate (LOPRESSOR) tablet 25 mg, 25 mg, Oral, BID  sevelamer (RENVELA) tablet 800 mg, 800 mg, Oral, TID WC  mirtazapine (REMERON) tablet 15 mg, 15 mg, Oral, Nightly  ARIPiprazole (ABILIFY) tablet 5 mg, 5 mg, Oral, Nightly  insulin glargine (LANTUS) injection vial 10 Units, 10 Units, SubCUTAneous, BID  HYDROmorphone HCl PF (DILAUDID) injection 0.25 mg, 0.25 mg, IntraVENous, Q3H PRN **OR** HYDROmorphone HCl PF (DILAUDID) injection 0.5 mg, 0.5 mg, IntraVENous, Q3H PRN  hydrALAZINE (APRESOLINE) injection 10 mg, 10 mg, IntraVENous, Q6H PRN  dextrose 50 % IV solution, 12.5 g, IntraVENous, PRN  insulin lispro (HUMALOG) injection vial 0-6 Units, 0-6 Units, SubCUTAneous, TID WC  insulin lispro (HUMALOG) injection vial 0-3 Units, 0-3 Units, SubCUTAneous, Nightly  albuterol (PROVENTIL) nebulizer solution 10 mg, 10 mg, Nebulization, Once  Allergies:  Cefepime and Toradol [ketorolac tromethamine]    Social History:    TOBACCO:   reports that she quit smoking about 7 months ago. Her smoking use included cigarettes. She has a 3.00 pack-year smoking history. She has never used smokeless tobacco.  ETOH:   reports no history of alcohol use.     Family History:       Problem Relation Age of Onset   [facility] Asthma Mother     Hypertension Mother     High Blood Pressure Mother     Diabetes Mother     Asthma Brother     High Blood Pressure Father      REVIEW OF SYSTEMS:    CONSTITUTIONAL:  positive for  fatigue and malaise  EYES:  negative  HEENT:  negative for  hearing loss and epistaxis  RESPIRATORY:  positive for dyspnea  CARDIOVASCULAR:  negative for  chest pain, dyspnea  GASTROINTESTINAL:  positive for nausea  GENITOURINARY:  negative  INTEGUMENT/BREAST:  negative  HEMATOLOGIC/LYMPHATIC:  negative  ALLERGIC/IMMUNOLOGIC:  positive for drug reactions  ENDOCRINE:  positive for weight changes    MUSCULOSKELETAL:  negative  NEUROLOGICAL:  negative        PHYSICAL EXAM:      Vitals:    VITALS:  /84   Pulse 83   Temp 97.6 °F (36.4 °C) (Oral)   Resp 21   Ht 5' 4\" (1.626 m)   Wt 147 lb (66.7 kg)   SpO2 98%   BMI 25.23 kg/m²   24HR INTAKE/OUTPUT:    Intake/Output Summary (Last 24 hours) at 9/8/2021 1419  Last data filed at 9/8/2021 1401  Gross per 24 hour   Intake 1545 ml   Output    Net 1545 ml       PD Catheter Exam:  PD catheter exit site clean    Constitutional: Awake in no acute distress  HEENT:  Normocephalic, PERRL  Respiratory: Decreased breath sounds on the basis  Cardiovascular/Edema:  RRR, S1/S2  Gastrointestinal:  Soft, PD catheter exit site clean   Neurologic:  Nonfocal, AVELAR  Skin:  Warm, dry, no lesions  Other:  no edema     DATA:    CBC:   Lab Results   Component Value Date    WBC 7.9 09/08/2021    RBC 3.13 09/08/2021    HGB 9.6 09/08/2021    HCT 31.1 09/08/2021    MCV 99.4 09/08/2021    MCH 30.7 09/08/2021    MCHC 30.9 09/08/2021    RDW 15.3 09/08/2021     09/08/2021    MPV 10.7 09/08/2021     CMP:    Lab Results   Component Value Date     09/08/2021    K 5.1 09/08/2021    K 4.0 08/26/2021    CL 97 09/08/2021    CO2 24 09/08/2021    BUN 39 09/08/2021    CREATININE 6.3 09/08/2021    GFRAA 9 09/08/2021    LABGLOM 9 09/08/2021    GLUCOSE 148 09/08/2021    GLUCOSE 130 05/18/2012    PROT 5.7 09/07/2021    LABALBU 2.5 09/07/2021    LABALBU 4.1 05/18/2012    CALCIUM 7.5 09/08/2021    BILITOT <0.2 09/07/2021    ALKPHOS 251 09/07/2021    AST 80 09/07/2021    ALT 41 09/07/2021     Magnesium:    Lab Results   Component Value Date    MG 1.9 09/08/2021     Phosphorus:    Lab Results   Component Value Date    PHOS 5.2 09/08/2021     Radiology Review:         CXR 9/08/2021   No acute cardiopulmonary process.               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 Upon arrival to ER her blood glucose was found to be 1087 and her potassium 7. Patient was started on insulin infusion and emergent PD initiated in ER.  We are consulted for hyperkalemia and ESRD on PD.    1. ESRD on PD, to continue CCPD.  2. Hyperkalemia, emergent PD initiated, hyperkalemia protocol initiated and albuterol administered, potassium levels improved. 3. DKA, initial blood glucose 1087, improved and no longer on insulin drip  4. HTN, on carvedilol  5. MBD of CKD, on sevelamer at home  6. Anemia of CKD, on epoetin alpha at home  -------------------------------------------------------------------  5. History of gastroparesis, on metoclopramide    Plan:    · CCPD 6 exchanges over 12 hours with long dwell of 2 L all exchanges 1.5% except final fill of 2.5%. · Monitor labs daily  · Monitor potassium levels  · Repeat BMP 6pm    Thank you Dr. Lali Nicholas for allowing us to participate in the care of Ms. Flores    Electronically signed by HEBER Trinidad CNP on 9/8/2021 at 2:22 PM

## 2021-09-08 NOTE — ED NOTES
Per DKA protocol Dr. Luciana Piña notified of BG.      Christina Negrete, [facility]  09/08/21 [id number]

## 2021-09-08 NOTE — CONSULTS
Acute Problems:  Hyperkalemia  ESRD on PD  DKA  Hypothyroidism      Plan of Care:  Emergent PD per nephrology, Repeat potassium 5.7  Continue insulin drip, patient appears to have the beginnings of DKA  IV fluids  Q 4 hour BMP, Mag, Phos  Repeat EKG  Continue Levothyroxine  Hold psych medications at this time secondary to lethargy and EKG changes from Hyperkalemia  Tylenol for pain  Zofran for nausea  Protonix for GI prphylaxis  Heparin for VTE prophylaxis    HPI:  This is a 79-year-old female who is being treated for the above medical conditions and has a past medical history of DM type I, frequent DKA, end-stage renal disease, cardiac arrest, infective endocarditis, and MRSA infection of the left lower leg as well as severe noncompliance. She presented to the emergency room earlier in the evening with complaints of hyperglycemia and generalized myalgia for 6 hours. She states that she missed her PD tonight. She also states that she did take her short acting and long-acting insulin however when she checked her blood sugar results stated high. She did present to the emergency department where she had multiple incidences of emesis. She currently complains of abdominal pain, mild nausea, and generalized myalgias however denies chest pain, shortness of breath, hematemesis or melena. Labs and imaging in the ED pertinent for sodium 122, corrected sodium 139, potassium 7.0, repeat 5.7, chloride 85, BUN 43, creatinine 7.4, lactic acid 1.7, glucose 1158, alkaline phos 251, ALT 41, AST 80, beta hydroxybutyrate 2.3, anion gap 15 and PH via ABG 7.32. In the emergency department the patient was given 1 L of NS, 10 units of IV regular insulin, 8 mg of Zofran, calcium gluconate and albuterol and was started on an insulin drip. Nephrology was consulted and she was dialyzed via her PD catheter.   Although the patient is currently not acidotic I do believe she has a beginnings of DKA secondary to possible ketones, slightly elevated anion gap and hyperglycemia.         Past Medical History:  Past Medical History:   Diagnosis Date    Acute congestive heart failure ([address]     BC (acute kidney injury) ([address] 10/01/2019    Cardiac arrest ([address] 02/15/2021    Cephalgia 10/09/2019    Chronic kidney disease     Depression     Diabetes mellitus ([address]     Diabetic gastroparesis associated with type 1 diabetes mellitus ([address] 2018    Diabetic ketoacidosis ([address] 2011    Diabetic ketoacidosis with coma associated with type 1 diabetes mellitus ([address] 2013    Diabetic polyneuropathy associated with type 1 diabetes mellitus ([address] 2020    Drug use complicating pregnancy in third trimester     Endocarditis 10/31/2020    ESRD (end stage renal disease) ([address] 2020    [facility] stress [facility]    [facility]    [facility])     History of blood transfusion 2019    Hyperlipidemia 10/08/2020    Hyperosmolar hyperglycemic state (HHS) ([address] 2020    Hypothyroidism 10/08/2020    Iron deficiency anemia 10/01/2019    MDRO (multiple drug resistant organisms) resistance     MRSA (methicillin resistant Staphylococcus aureus)     back wound abcess    Non compliance w medication regimen 2016    Other disorders of kidney and ureter     Pregnancy 2016    16 weeks    Previous  delivery affecting pregnancy, antepartum 2017    Previous stillbirth or demise, antepartum 2016    Seizure ([address] 2020    Severe pre-eclampsia in third trimester 2016    Shock liver 02/15/2021    Valvular endocarditis 11/10/2020    This Diagnosis was added to the Problem List based on transcribed orders from Dr. Ansari Leader           Family History:   Family History   Problem Relation Age of Onset    Asthma Mother     Hypertension Mother     High Blood Pressure Mother     Diabetes Mother     Asthma Brother     High Blood Pressure Father        Allergies: Cefepime and Toradol [ketorolac tromethamine]    Social history:  Social History     Socioeconomic History    Marital status: Single     Spouse name: Not on file    Number of children: 2    Years of education: Not on file    Highest education level: Not on file   Occupational History    Not on file   Tobacco Use    Smoking status: Former Smoker     Packs/day: 0.50     Years: 6.00     Pack years: 3.00     Types: Cigarettes     Quit date: 2021     Years since quittin.6    Smokeless tobacco: Never Used    Tobacco comment: vaper cig   Vaping Use    Vaping Use: Never used   Substance and Sexual Activity    Alcohol use: No    Drug use: No     Comment: documented prior history of opioid abuse    Sexual activity: Yes     Partners: Male   Other Topics Concern    Not on file   Social History Narrative    Not on file     Social Determinants of Health     Financial Resource Strain:     Difficulty of Paying Living Expenses:    Food Insecurity:     Worried About Running Out of Food in the Last Year:     Ran Out of Food in the Last Year:    Transportation Needs:     Lack of Transportation (Medical):      Lack of Transportation (Non-Medical):    Physical Activity:     Days of Exercise per Week:     Minutes of Exercise per Session:    Stress:     Feeling of Stress :    Social Connections:     Frequency of Communication with Friends and Family:     Frequency of Social Gatherings with Friends and Family:     Attends [employer] Services:     Active Member of Clubs or Organizations:     Attends Club or Organization Meetings:     Marital Status:    Intimate Partner Violence:     Fear of Current or Ex-Partner:     Emotionally Abused:     Physically Abused:     Sexually Abused:        Current Medications:     Current Facility-Administered Medications:     dextrose 5 % and 0.45 % NaCl with KCl 20 mEq infusion, , IntraVENous, Continuous PRN, Ryley Thomas T Hausken, DO    insulin regular (HUMULIN R; NOVOLIN R) 100 Units in sodium chloride 0.9 % 100 mL infusion, 0.1 Units/kg/hr, IntraVENous, Continuous, Ryley Thomas T Hausken, DO, Last Rate: 6.7 mL/hr at 09/08/21 0048, 0.1 Units/kg/hr at 09/08/21 0048    dextrose 50 % IV solution, 12.5 g, IntraVENous, PRN, Ryley Thomas T Hausken, DO    potassium chloride 10 mEq/100 mL IVPB (Peripheral Line), 10 mEq, IntraVENous, PRN, Ryley Thomas T Hausken, DO    magnesium sulfate 1000 mg in dextrose 5% 100 mL IVPB, 1,000 mg, IntraVENous, PRN, Ryley Thomas T Hausken, DO    sodium phosphate 10 mmol in dextrose 5 % 250 mL IVPB, 10 mmol, IntraVENous, PRN **OR** sodium phosphate 15 mmol in dextrose 5 % 250 mL IVPB, 15 mmol, IntraVENous, PRN **OR** sodium phosphate 20 mmol in dextrose 5 % 500 mL IVPB, 20 mmol, IntraVENous, PRN, Ryley Thomas T Hausken, DO    0.45 % sodium chloride infusion, , IntraVENous, Continuous, Ryley Thomas T Hausken, DO, Last Rate: 250 mL/hr at 09/08/21 0134, New Bag at 09/08/21 0134    sodium chloride flush 0.9 % injection 5-40 mL, 5-40 mL, IntraVENous, 2 times per day, Mckenzie Sites, APRN - CNP    sodium chloride flush 0.9 % injection 10 mL, 10 mL, IntraVENous, PRN, Mckenzie Sites, APRN - CNP    0.9 % sodium chloride infusion, 25 mL, IntraVENous, PRN, Mckenzie Sites, APRN - CNP    heparin (porcine) injection 5,000 Units, 5,000 Units, SubCUTAneous, 3 times per day, Mckenzie Sites, APRN - CNP    ondansetron (ZOFRAN-ODT) disintegrating tablet 4 mg, 4 mg, Oral, Q8H PRN **OR** ondansetron (ZOFRAN) injection 4 mg, 4 mg, IntraVENous, Q6H PRN, Mckenzie Sites, APRN - CNP    polyethylene glycol (GLYCOLAX) packet 17 g, 17 g, Oral, Daily PRN, Mckenzie Sites, APRN - CNP    acetaminophen (TYLENOL) tablet 650 mg, 650 mg, Oral, Q6H PRN **OR** acetaminophen (TYLENOL) suppository 650 mg, 650 mg, Rectal, Q6H PRN, Mckenzie Sites, APRN - CNP    pantoprazole (PROTONIX) tablet 40 mg, 40 mg, Oral, QAM AC, Mckenzie Sites, APRN - CNP    ARIPiprazole (ABILIFY) tablet 5 mg, 5 mg, Oral, Nightly, Arthea Reddish, APRN - CNP    folic acid (FOLVITE) tablet 1 mg, 1 mg, Oral, Daily, Arthea Reddish, APRN - CNP    levothyroxine (SYNTHROID) tablet 75 mcg, 75 mcg, Oral, Daily, Arthea Reddish, APRN - CNP    metoprolol tartrate (LOPRESSOR) tablet 25 mg, 25 mg, Oral, BID, Arthea Reddish, APRN - CNP    sevelamer (RENVELA) tablet 800 mg, 800 mg, Oral, TID WC, Arthea Reddish, APRN - CNP    albuterol (PROVENTIL) nebulizer solution 10 mg, 10 mg, Nebulization, Once, Ryley [employer], DO    Current Outpatient Medications:     vitamin D (ERGOCALCIFEROL) 1.25 MG (85150 UT) CAPS capsule, Take 1 capsule by mouth once a week, Disp: 5 capsule, Rfl: 0    metoprolol tartrate (LOPRESSOR) 25 MG tablet, Take 25 mg by mouth 2 times daily, Disp: , Rfl:     bumetanide (BUMEX) 1 MG tablet, Take 2 mg by mouth 2 times daily, Disp: , Rfl:     insulin glargine (LANTUS) 100 UNIT/ML injection vial, Inject 7 Units into the skin daily New change in dose and frequency, Disp: 1 vial, Rfl: 3    sevelamer (RENVELA) 800 MG tablet, Take 1 tablet by mouth 3 times daily (with meals) New lower dose, Disp: 90 tablet, Rfl: 3    insulin lispro, 1 Unit Dial, (HUMALOG KWIKPEN) 100 UNIT/ML SOPN, Inject 3 units with meals + sliding scale. MAX 30U/day, Disp: 10 pen, Rfl: 3    Insulin Pen Needle (BD PEN NEEDLE NAV U/F) 32G X 4 MM MISC, Uses with insulin 4 times a day, Disp: 250 each, Rfl: 5    blood glucose monitor strips, Freestyle Lite Strips.  Checks 4 times/day before meals and at bedtime and as needed for symptoms of irregular blood glucose., Disp: 250 strip, Rfl: 5    mirtazapine (REMERON) 15 MG tablet, Take 15 mg by mouth nightly, Disp: , Rfl:     hydrOXYzine (ATARAX) 25 MG tablet, Take 25 mg by mouth daily, Disp: , Rfl:     polyethylene glycol (GLYCOLAX) 17 g packet, Take 17 g by mouth daily as needed for Constipation, Disp: , Rfl:     ARIPiprazole (ABILIFY) 5 MG tablet, Take 5 mg by mouth nightly, Disp: , Rfl:   Glucagon, rDNA, (GLUCAGON EMERGENCY IJ), Inject as directed, Disp: , Rfl:     glucose (GLUTOSE) 40 % GEL, Take 15 g by mouth See Admin Instructions, Disp: , Rfl:     pantoprazole (PROTONIX) 40 MG tablet, Take 1 tablet by mouth every morning (before breakfast), Disp: 30 tablet, Rfl: 3    levothyroxine (SYNTHROID) 75 MCG tablet, TAKE 1 TABLET BY MOUTH IN THE MORNING BEFORE BREAKFAST, Disp: 60 tablet, Rfl: 0    epoetin nena-epbx (RETACRIT) 3000 UNIT/ML SOLN injection, Inject 1 mL into the skin three times a week, Disp: 21.9 mL, Rfl:     rOPINIRole (REQUIP) 0.25 MG tablet, Take 0.25 mg by mouth nightly, Disp: , Rfl:     blood glucose test strips (CONTOUR NEXT TEST) strip, 1 each by In Vitro route 5 times daily As needed. , Disp: 150 each, Rfl: 5    metoclopramide (REGLAN) 5 MG tablet, Take 5 mg by mouth 3 times daily , Disp: , Rfl:     pregabalin (LYRICA) 25 MG capsule, Take 1 capsule by mouth daily for 30 days. , Disp: 30 capsule, Rfl: 0    folic acid (FOLVITE) 1 MG tablet, Take 1 tablet by mouth daily, Disp: 30 tablet, Rfl: 3    Review of Systems:   General: denies weight gain, positive for loss of appetite, but negative for fever, chills, night sweats. HEENT: denies headaches, dizziness, head trauma, visual changes, eye pain, tinnitus, nosebleeds, hoarseness or throat pain    Respiratory: denies chest pain, dyspnea, cough and hemoptysis  Cardiovascular: denies orthopnea, paroxysmal nocturnal dyspnea, leg swelling, and previous heart attack. Gastrointestinal: Positive for pain, nausea vomiting, but negative for diarrhea, constipation, melena or bleeding.   Genitourinary: denies hematuria, frequency, urgency or dysuria  Neurology: denies syncope, seizures, paralysis, paraesthesia   Endocrine: denies polyuria, polydipsia, skin or hair changes, and heat or cold intolerance  Musculoskeletal: denies joint pain, swelling, arthritis or myalgia  Hematologic: denies bleeding, adenopathy and easy bruising  Skin: denies rashes and skin discoloration  Psychiatry: denies depression    Physical Exam:   Vital Signs:  BP (!) 167/97   Pulse 108   Temp 98.1 °F (36.7 °C) (Oral)   Resp 18   Ht 5' 4\" (1.626 m)   Wt 147 lb (66.7 kg)   SpO2 99%   BMI 25.23 kg/m²     Input/Output:  No intake/output data recorded. Oxygen requirements: Room air    Ventilator Information:  Vent Information  SpO2: 99 %    General appearance: Well developed, not in pain or distress, in no respiratory distress    HEENT: Atraumatic/normocephalic, EOMI, MELI, pharynx clear, dry mucosa, redness of the uvula appreciated,   Neck: Supple, no jugular venous distension, lymphadenopathy, thyromegaly or carotid bruits  Chest: Equal normal breath sounds, no wheezing, no crackles and no tenderness over ribs   Cardiovascular: Normal S1 , S2, increased rate and regular rhythm, no murmur, rub or gallop  Abdomen: Normal sounds present, soft, lax generalized tenderness, no hepatosplenomegaly, and no masses, peritoneal dialysis catheter in place  Extremities: Chronic edema noted to left lower extremity secondary to previous wound with MRSA infection. Pulses are equally present. Skin: intact, no rashes, multiple scars and tattoos  Neurologic: Alert and oriented x 3, No focal deficit     Investigations:  Labs, radiological imaging and cardiac work up were reviewed        ICU STAFF PHYSICIAN NOTE OF PERSONAL INVOLVEMENT IN CARE  As the attending physician, I certify that I personally reviewed the patient's history and personally examined the patient to confirm the physical findings described above, and that I reviewed the relevant imaging studies and available reports. I also discussed the differential diagnosis and all of the proposed management plans with the patient and individuals accompanying the patient to this visit. They had the opportunity to ask questions about the proposed management plans and to have those questions answered.     This patient has a high probability of sudden, clinically significant deterioration, which requires the highest level of physician preparedness to intervene urgently. I managed/supervised life or organ supporting interventions that required frequent physician assessment. I devoted my full attention to the direct care of this patient for the amount of time indicated below. Time I spent with family or surrogate(s) is included only if the patient was incapable of providing the necessary information or participating in medical decision-making. Time devoted to teaching and to any procedures I billed separately is not included.   Critical Care Time: 35 minutes      Electronically signed by HEBER Levin CNP on 9/8/2021 at 2:53 AM

## 2021-09-08 NOTE — FLOWSHEET NOTE
This note also relates to the following rows which could not be included:  BP - Cannot attach notes to unvalidated device data  Pulse - Cannot attach notes to unvalidated device data  Resp - Cannot attach notes to unvalidated device data  SpO2 - Cannot attach notes to unvalidated device data       09/08/21 0300   Cycler   Verification of Prescription CCPD   Total Volume Programmed 63969 mL   Therapy Time (Hours:Minutes) 12   Fill Volume 2000 mL   Last Fill Volume 2000 mL   Dextrose Setting Same (Nonextraneal)   Number of Cycles 6   Bag Volume 5000 mL   Number of Bags Used 3   Dianeal Solution   (2- 1.5 % 5000ml bags and 1- 2.5 % 5000 ml bag)   CCPD therapy tx initiated at this time utilizing aseptic technique. Exit site cath care rendered. Site unremarkable. Negative for pain/ discomfort w/ palp. Skin tone natural to surrounding site. Normothermic. Drain and fill sequences series finalized w/o complications. Effluent fluid translucent pale yellow. Negative for fibrin/ sediment. Pt tolerated well. No adverse reactions.

## 2021-09-08 NOTE — ED PROVIDER NOTES
toxic-appearing or diaphoretic. Comments: Chronically ill-appearing young female laying in bed under a blanket. No acute distress. HENT:      Head: Normocephalic and atraumatic. Right Ear: External ear normal.      Left Ear: External ear normal.      Nose: Nose normal.      Mouth/Throat:      Mouth: Mucous membranes are moist.      Pharynx: Oropharynx is clear. Eyes:      General:         Right eye: No discharge. Left eye: No discharge. Extraocular Movements: Extraocular movements intact. Conjunctiva/sclera: Conjunctivae normal.   Cardiovascular:      Rate and Rhythm: Regular rhythm. Tachycardia present. Pulmonary:      Effort: Pulmonary effort is normal. No respiratory distress. Breath sounds: Normal breath sounds. No wheezing or rales. Abdominal:      Comments: Peritoneal dialysis catheter in place in abdomen, no surrounding erythema, mild tenderness to palpation in the epigastric region, otherwise no tenderness to palpation throughout the abdomen. Abdomen is soft, no guarding or rigidity. Musculoskeletal:         General: No swelling or deformity. Normal range of motion. Cervical back: Normal range of motion and neck supple. Right lower leg: No edema. Left lower leg: No edema. Skin:     General: Skin is warm and dry. Capillary Refill: Capillary refill takes less than 2 seconds. Comments: Multiple scars across the upper chest, multiple tattoos across arms and upper chest.   Neurological:      General: No focal deficit present. Mental Status: She is alert. Psychiatric:         Behavior: Behavior normal.         Thought Content:  Thought content normal.         Judgment: Judgment normal.      Comments: Flat affect          Procedures   Central Line Placement Procedure Note    Indication: vascular access, poor peripheral access and need for frequent blood draws    Consent: The patient provided verbal consent for this procedure. Procedure: The patient was positioned appropriately and the skin over the right femoral vein was prepped with Chloraprep and draped in a sterile fashion. Local anesthesia was obtained by infiltration using 2cc 1% Lidocaine without epinephrine. A large bore needle was used to identify the vein under ultrasound guidance. A guide wire was then inserted into the vein through the needle. A triple lumen catheter was then inserted into the vessel over the guide wire using the Seldinger technique. All ports showed good, free flowing blood return and were flushed with saline solution. The catheter was then securely fastened to the skin with suture at 20 cm. Two sutures were placed into the proximal eyelets. the site was then covered with a sterile dressing with antibiotic gel patch over insertion site. A post procedure X-ray was not indicated. The patient tolerated the procedure well. Complications: None        MDM  Number of Diagnoses or Management Options  Hyperglycemia  Hyperkalemia  Diagnosis management comments: This is a 51-year-old female with complex past medical history including history of prior cardiac arrest, infective endocarditis, end-stage renal disease on peritoneal dialysis, gastroparesis, poorly controlled type 1 diabetes, presenting to the emergency department for myalgias, high blood sugars. Patient with initial point-of-care glucose greater than 500, mildly tachycardic, mildly hypertensive, but afebrile and well-appearing. Patient with no significant abdominal pain or tenderness. EKG obtained with sinus tachycardia, peaked T waves, concern for hyperkalemia. Patient with blood glucose of over 1100, potassium of 7.0. Calcium gluconate given, patient given albuterol, insulin bolus, nephrology contacted for emergent peritoneal dialysis in the ED. Patient with mild acidosis, moderately elevated betahydroxybutryate, normal anion gap and normal bicarb.   Given patient's significant hyperglycemia, insulin drip started, patient with limited peripheral access, central line placed. Repeat potassium downtrending, patient with no arrhythmias in the emergency department. Patient successfully received peritoneal dialysis emergency department, blood glucose downtrending on insulin drip. Patient was admitted to the ICU pending bed availability. Amount and/or Complexity of Data Reviewed  Clinical lab tests: reviewed         ED Course as of Sep 08 1439   Tue Sep 07, 2021   2359 Patient's corrected sodium 139. [RH]   Wed Sep 08, 2021   0000 Central line placed, patient resting comfortably in bed. Insulin drip ordered. [RH]   0013 Patient reevaluated, she is awake, alert, on telemetry monitoring, receiving calcium    [RH]   0036 EKG: This EKG is signed and interpreted by me. Rate: 105  Rhythm: Sinus  Interpretation:  hyperkalemia, peaked T waves  Comparison: changes compared to previous EKG, QT is shortened, T waves peaked      [RH]   1733 Discussed case with nephrology Dr. Lety Elder, he recommended contacting on-call dialysis nurse to come to the ER to dialyze patient. [RH]   5483 Paging on-call dialysis nurse now. [RH]   8432 Spoke to on-call dialysis nurse, he will be in in about 30 minutes. [RH]   4135 Patient reevaluated, she is resting bed comfortably, sinus tachycardia on monitor    [RH]   0057 Discussed case with Jb Cruz, for Dr. Brenna Ortiz for ICU. She agreed to admit patient to ICU pending availability, he agreed to come evaluate patient. [RH]   5940 Discussed case with Dr. Suri Tom, he agreed to admit patient. [RH]      ED Course User Index  [RH] [address], DO     ED Course as of Sep 08 1439   Tue Sep 07, 2021   2359 Patient's corrected sodium 139. [RH]   Wed Sep 08, 2021   0000 Central line placed, patient resting comfortably in bed. Insulin drip ordered.     [RH]   0013 Patient reevaluated, she is awake, alert, on telemetry monitoring, receiving calcium    [RH]   0036 EKG: This EKG is signed and interpreted by me. Rate: 105  Rhythm: Sinus  Interpretation:  hyperkalemia, peaked T waves  Comparison: changes compared to previous EKG, QT is shortened, T waves peaked      [RH]   2407 Discussed case with nephrology Dr. Alberto Hinson, he recommended contacting on-call dialysis nurse to come to the ER to dialyze patient. [RH]   9535 Paging on-call dialysis nurse now. [RH]   1306 Spoke to on-call dialysis nurse, he will be in in about 30 minutes. [RH]   1132 Patient reevaluated, she is resting bed comfortably, sinus tachycardia on monitor    [RH]   0057 Discussed case with Piter Torres, for Dr. Consuelo Payton for ICU. She agreed to admit patient to ICU pending availability, he agreed to come evaluate patient. [RH]   0792 Discussed case with Dr. Eulalio Curling, he agreed to admit patient.     [RH]      ED Course User Index  [RH] [address], DO       --------------------------------------------- PAST HISTORY ---------------------------------------------  Past Medical History:  has a past medical history of Acute congestive heart failure ([address] BC (acute kidney injury) ([address] Cardiac arrest ([address] Cephalgia, Chronic kidney disease, Depression, Diabetes mellitus ([address] Diabetic gastroparesis associated with type 1 diabetes mellitus ([address] Diabetic ketoacidosis ([address] Diabetic ketoacidosis with coma associated with type 1 diabetes mellitus ([address] Diabetic polyneuropathy associated with type 1 diabetes mellitus ([address] Drug use complicating pregnancy in third trimester, Endocarditis, ESRD (end stage renal disease) ([address] H/O cardiovascular stress test, Hemodialysis patient ([address] History of blood transfusion, Hyperlipidemia, Hyperosmolar hyperglycemic state (HHS) ([facility] Hypothyroidism, Iron deficiency anemia, MDRO (multiple drug resistant organisms) resistance, MRSA (methicillin resistant Staphylococcus aureus), Non compliance w medication regimen, Other disorders of kidney and ureter, Pregnancy, Previous  delivery affecting pregnancy, antepartum, Previous stillbirth or demise, antepartum, Seizure ([address] Severe pre-eclampsia in third trimester, Shock liver, and Valvular endocarditis. Past Surgical History:  has a past surgical history that includes ECHO Compl W Dop Color Flow (2012); ECHO Compl W Dop Color Flow (6/10/2013);  section; Tunneled venous port placement (2018); pr esophagogastroduodenoscopy transoral diagnostic (N/A, 2018); back surgery; Colonoscopy (N/A, 2018); Colonoscopy (N/A, 2018); Upper gastrointestinal endoscopy (2018); Upper gastrointestinal endoscopy (N/A, 10/11/2019); Cholecystectomy, laparoscopic (N/A, 2019); LAPAROSCOPY INSERTION PERITONEAL CATHETER (N/A, 2020); transesophageal echocardiogram (N/A, 10/19/2020); embolectomy (N/A, 2020); Foot Debridement (Left, 2021); Foot Debridement (Left, 2021); and Upper gastrointestinal endoscopy (N/A, 2021). Social History:  reports that she quit smoking about 7 months ago. Her smoking use included cigarettes. She has a 3.00 pack-year smoking history. She has never used smokeless tobacco. She reports that she does not drink alcohol and does not use drugs. Family History: family history includes Asthma in her brother and mother; Diabetes in her mother; High Blood Pressure in her father and mother; Hypertension in her mother. The patients home medications have been reviewed.     Allergies: Cefepime and Toradol [ketorolac tromethamine]    -------------------------------------------------- RESULTS -------------------------------------------------    Lab  Results for orders placed or performed during the hospital encounter of 21   COVID-19, Rapid    Specimen: Nasopharyngeal Swab   Result Value Ref Range    SARS-CoV-2, NAAT Not Detected Not Detected   CBC auto differential   Result Value Ref Range    WBC -3.0 - 3.0 mmol/L    O2 Sat 97.8 92.0 - 98.5 %    O2Hb 90.6 (L) 94.0 - 97.0 %    COHb 7.1 (H) 0.0 - 1.5 %    MetHb 0.3 0.0 - 1.5 %    O2 Content 15.7 mL/dL    HHb 2.0 0.0 - 5.0 %    tHb (est) 12.1 11.5 - 16.5 g/dL    Mode RA     Date Of Collection      Time Collected      Pt Temp 37.0 C     ID [id number]     Lab [id number]     Critical(s) Notified .  No Critical Values    Basic Metabolic Panel   Result Value Ref Range    Sodium 129 (L) 132 - 146 mmol/L    Potassium 5.1 (H) 3.5 - 5.0 mmol/L    Chloride 93 (L) 98 - 107 mmol/L    CO2 21 (L) 22 - 29 mmol/L    Anion Gap 15 7 - 16 mmol/L    Glucose 801 (HH) 74 - 99 mg/dL    BUN 44 (H) 6 - 20 mg/dL    CREATININE 7.6 (H) 0.5 - 1.0 mg/dL    GFR Non-African American 8 >=60 mL/min/1.73    GFR African American 8     Calcium 7.9 (L) 8.6 - 10.2 mg/dL   Basic Metabolic Panel   Result Value Ref Range    Sodium 131 (L) 132 - 146 mmol/L    Potassium 4.7 3.5 - 5.0 mmol/L    Chloride 96 (L) 98 - 107 mmol/L    CO2 23 22 - 29 mmol/L    Anion Gap 12 7 - 16 mmol/L    Glucose 221 (H) 74 - 99 mg/dL    BUN 40 (H) 6 - 20 mg/dL    CREATININE 7.0 (H) 0.5 - 1.0 mg/dL    GFR Non-African American 8 >=60 mL/min/1.73    GFR African American 8     Calcium 7.7 (L) 8.6 - 10.2 mg/dL   Basic Metabolic Panel   Result Value Ref Range    Sodium 131 (L) 132 - 146 mmol/L    Potassium 5.1 (H) 3.5 - 5.0 mmol/L    Chloride 97 (L) 98 - 107 mmol/L    CO2 24 22 - 29 mmol/L    Anion Gap 10 7 - 16 mmol/L    Glucose 148 (H) 74 - 99 mg/dL    BUN 39 (H) 6 - 20 mg/dL    CREATININE 6.3 (H) 0.5 - 1.0 mg/dL    GFR Non-African American 9 >=60 mL/min/1.73    GFR African American 9     Calcium 7.5 (L) 8.6 - 10.2 mg/dL   Magnesium   Result Value Ref Range    Magnesium 1.9 1.6 - 2.6 mg/dL   Magnesium   Result Value Ref Range    Magnesium 1.9 1.6 - 2.6 mg/dL   Magnesium   Result Value Ref Range    Magnesium 1.9 1.6 - 2.6 mg/dL   Phosphorus   Result Value Ref Range    Phosphorus 5.7 (H) 2.5 - 4.5 mg/dL   Phosphorus   Result Value Ref Range    Phosphorus 5.3 (H) 2.5 - 4.5 mg/dL   Phosphorus   Result Value Ref Range    Phosphorus 5.2 (H) 2.5 - 4.5 mg/dL   POTASSIUM   Result Value Ref Range    Potassium 5.7 (H) 3.5 - 5.0 mmol/L   GLUCOSE, RANDOM   Result Value Ref Range    Glucose 1,087 (HH) 74 - 99 mg/dL   CBC WITH AUTO DIFFERENTIAL   Result Value Ref Range    WBC 7.9 4.5 - 11.5 E9/L    RBC 3.13 (L) 3.50 - 5.50 E12/L    Hemoglobin 9.6 (L) 11.5 - 15.5 g/dL    Hematocrit 31.1 (L) 34.0 - 48.0 %    MCV 99.4 80.0 - 99.9 fL    MCH 30.7 26.0 - 35.0 pg    MCHC 30.9 (L) 32.0 - 34.5 %    RDW 15.3 (H) 11.5 - 15.0 fL    Platelets 053 267 - 302 E9/L    MPV 10.7 7.0 - 12.0 fL    Neutrophils % 80.0 43.0 - 80.0 %    Immature Granulocytes % 0.5 0.0 - 5.0 %    Lymphocytes % 14.0 (L) 20.0 - 42.0 %    Monocytes % 3.6 2.0 - 12.0 %    Eosinophils % 0.9 0.0 - 6.0 %    Basophils % 1.0 0.0 - 2.0 %    Neutrophils Absolute 6.28 1.80 - 7.30 E9/L    Immature Granulocytes # 0.04 E9/L    Lymphocytes Absolute 1.10 (L) 1.50 - 4.00 E9/L    Monocytes Absolute 0.28 0.10 - 0.95 E9/L    Eosinophils Absolute 0.07 0.05 - 0.50 E9/L    Basophils Absolute 0.08 0.00 - 0.20 E9/L   GLUCOSE, RANDOM   Result Value Ref Range    Glucose 619 (HH) 74 - 99 mg/dL   POCT Glucose   Result Value Ref Range    Meter Glucose >500 (H) 74 - 99 mg/dL   POCT glucose   Result Value Ref Range    Glucose  mg/dL    QC OK? pass    POCT Glucose - every hour   Result Value Ref Range    Glucose  mg/dL    QC OK? pass    POCT Glucose   Result Value Ref Range    Meter Glucose >500 (H) 74 - 99 mg/dL   POCT Glucose   Result Value Ref Range    Meter Glucose >500 (H) 74 - 99 mg/dL   POCT Glucose   Result Value Ref Range    Meter Glucose >500 (H) 74 - 99 mg/dL   POCT Glucose   Result Value Ref Range    Meter Glucose 312 (H) 74 - 99 mg/dL   POCT Glucose   Result Value Ref Range    Meter Glucose 196 (H) 74 - 99 mg/dL   POCT Glucose   Result Value Ref Range    Meter Glucose 176 (H) 74 - 99 mg/dL   POCT Glucose   Result Value Ref Range    Meter Glucose 96 74 - 99 mg/dL   POCT Glucose   Result Value Ref Range    Meter Glucose 67 (L) 74 - 99 mg/dL   POCT Glucose   Result Value Ref Range    Meter Glucose 131 (H) 74 - 99 mg/dL   POCT Glucose   Result Value Ref Range    Meter Glucose 80 74 - 99 mg/dL   POCT Glucose   Result Value Ref Range    Meter Glucose 120 (H) 74 - 99 mg/dL   EKG 12 Lead   Result Value Ref Range    Ventricular Rate 108 BPM    Atrial Rate 108 BPM    P-R Interval 138 ms    QRS Duration 96 ms    Q-T Interval 366 ms    QTc Calculation (Bazett) 490 ms    P Axis 53 degrees    R Axis 38 degrees    T Axis 39 degrees   EKG 12 Lead   Result Value Ref Range    Ventricular Rate 100 BPM    Atrial Rate 100 BPM    P-R Interval 136 ms    QRS Duration 92 ms    Q-T Interval 372 ms    QTc Calculation (Bazett) 479 ms    P Axis 34 degrees    R Axis 39 degrees    T Axis 42 degrees       Radiology  XR CHEST PORTABLE   Final Result   No acute cardiopulmonary process. ------------------------- NURSING NOTES AND VITALS REVIEWED ---------------------------  Date / Time Roomed:  9/7/2021 10:04 PM  ED Bed Assignment:  09/09    The nursing notes within the ED encounter and vital signs as below have been reviewed.    Patient Vitals for the past 24 hrs:   BP Temp Temp src Pulse Resp SpO2 Height Weight   09/08/21 1400 138/84   83 21 98 %     09/08/21 1300 131/84   77 10 100 %     09/08/21 1237 131/86   80 10 98 %     09/08/21 1200 (!) 147/102   79 11 99 %     09/08/21 1100 (!) 177/101   84 12 98 %     09/08/21 1000 (!) 177/119   83 14 100 %     09/08/21 0800 (!) 162/98   81 11 100 %     09/08/21 0730 (!) 174/98 97.6 °F (36.4 °C) Oral 84 12 99 %     09/08/21 0536 (!) 144/95   84 12 99 %     09/08/21 0406 129/89   104 12 91 %     09/08/21 0320 (!) 151/96   105       09/08/21 0230 (!) 167/97   108 18 99 %     09/08/21 0215    109 16      09/08/21 0024 (!) 153/97 98.1 °F (36.7 °C) Oral 117 14 97 %     09/07/21 2328 126/87 97.4 °F (36.3 °C) Oral 106 16 95 %     09/07/21 2028 107/65    16      09/07/21 2007  97.5 °F (36.4 °C) Infrared 110  96 % 5' 4\" (1.626 m) 147 lb (66.7 kg)       Oxygen Saturation Interpretation: Normal      ------------------------------------------ PROGRESS NOTES ------------------------------------------  Re-evaluation(s):  See ED COURSE    I have spoken with the patient and discussed todays results, in addition to providing specific details for the plan of care and counseling regarding the diagnosis and prognosis. Their questions are answered at this time and they are agreeable with the plan.      --------------------------------- ADDITIONAL PROVIDER NOTES ---------------------------------  Consultations:  See ED COURSE    This patient's ED course included: a personal history and physicial examination, re-evaluation prior to disposition, multiple bedside re-evaluations, IV medications, cardiac monitoring, continuous pulse oximetry and complex medical decision making and emergency management    This patient has remained hemodynamically stable during their ED course. Please note that the withdrawal or failure to initiate urgent interventions for this patient would likely result in a life threatening deterioration or permanent disability. See attending attestation/addendum for specific critical care time. Clinical Impression  1. Hyperkalemia New Problem   2. Hyperglycemia Inadequately Controlled   3. Uncontrolled type 1 diabetes mellitus with hyperglycemia ([facility] Inadequately Controlled         Disposition  Patient's disposition: Admit to CCU/ICU  Patient's condition is serious but stable       [address] DO  Resident  09/08/21 6370      ATTENDING PROVIDER ATTESTATION:     I have personally performed and/or participated in the history, exam, medical decision making, and procedures and agree with all pertinent clinical information.       I have also reviewed and agree with the past medical, family and social history unless otherwise noted. I have discussed this patient in detail with the resident, and provided the instruction and education regarding hyperglycemia and diabetic ketoacidosis and central line insertion. My findings/Plan: Tachycardic. Lungs CTA bilaterally. Abdomen soft, nontender. Bowel sounds normal. Supportive care. IV fluids. Insulin infusion. Admit for further evaluation and treatment. Central venous catheter inserted under my direct supervision and without complication. Please note that the withdrawal or failure to initiate urgent interventions for this patient would likely result in a life threatening deterioration or permanent disability. Accordingly this patient received 36 minutes of critical care time, excluding separately billable procedures.            [address] [place] [address],   10/04/21 [id number]

## 2021-09-08 NOTE — PROGRESS NOTES
[address] [place], [place] [address]ist   Progress Note    Admitting Date and Time: 9/7/2021 10:04 PM  Admit Dx: Hyperkalemia [E87.5]    Subjective:    Pt stating having a lot of pain in abdomen. Per RN: patient had PD dwell in from overnight that needs to be emptied. Also, last two BMP with AG closes x 2. Okay to transition to Lantus?        sodium chloride flush  5-40 mL IntraVENous 2 times per day    heparin (porcine)  5,000 Units SubCUTAneous 3 times per day    pantoprazole  40 mg Oral QAM AC    folic acid  1 mg Oral Daily    levothyroxine  75 mcg Oral Daily    metoprolol tartrate  25 mg Oral BID    sevelamer  800 mg Oral TID WC    mirtazapine  15 mg Oral Nightly    ARIPiprazole  5 mg Oral Nightly    insulin glargine  10 Units SubCUTAneous BID    albuterol  10 mg Nebulization Once     dextrose 5% and 0.45% NaCl with KCl 20 mEq, , Continuous PRN  dextrose, 12.5 g, PRN  potassium chloride, 10 mEq, PRN  magnesium sulfate, 1,000 mg, PRN  sodium phosphate IVPB, 10 mmol, PRN   Or  sodium phosphate IVPB, 15 mmol, PRN   Or  sodium phosphate IVPB, 20 mmol, PRN  sodium chloride flush, 10 mL, PRN  sodium chloride, 25 mL, PRN  ondansetron, 4 mg, Q8H PRN   Or  ondansetron, 4 mg, Q6H PRN  polyethylene glycol, 17 g, Daily PRN  acetaminophen, 650 mg, Q6H PRN   Or  acetaminophen, 650 mg, Q6H PRN  HYDROmorphone, 0.25 mg, Q3H PRN   Or  HYDROmorphone, 0.5 mg, Q3H PRN  hydrALAZINE, 10 mg, Q6H PRN         Objective:    BP (!) 162/98   Pulse 81   Temp 97.6 °F (36.4 °C) (Oral)   Resp 11   Ht 5' 4\" (1.626 m)   Wt 147 lb (66.7 kg)   SpO2 100%   BMI 25.23 kg/m²   General Appearance: alert and in no acute distress  Skin: warm and dry  Head: normocephalic and atraumatic  Eyes: pupils equal, round, and reactive to light, extraocular eye movements intact, conjunctivae normal  Neck: neck supple and non tender without mass   Pulmonary/Chest: clear to auscultation bilaterally- no wheezes, rales or rhonchi, normal air movement, no respiratory distress  Cardiovascular: normal rate, normal S1 and S2 and no carotid bruits  Abdomen: soft, mild diffuse, non-distended, normal bowel sounds, no masses or organomegaly  Extremities: no cyanosis, no clubbing and no edema  Neurologic: no cranial nerve deficit and speech normal      Recent Labs     09/07/21 2253 09/07/21 2253 09/08/21  0154 09/08/21  0154 09/08/21  0400 09/08/21  0502 09/08/21  0806   *  --   --   --  129*  --  131*   K 7.0*   < > 5.7*  --  5.1*  --  4.7   CL 85*  --   --   --  93*  --  96*   CO2 22  --   --   --  21*  --  23   BUN 43*  --   --   --  44*  --  40*   CREATININE 7.4*  --   --   --  7.6*  --  7.0*   GLUCOSE 1,158*   < > 1,087*   < > 801* 619* 221*   CALCIUM 7.8*  --   --   --  7.9*  --  7.7*    < > = values in this interval not displayed. Recent Labs     09/07/21 2253   ALKPHOS 251*   PROT 5.7*   LABALBU 2.5*   BILITOT <0.2   AST 80*   ALT 41*       Recent Labs     09/07/21 2253 09/08/21  0400   WBC 8.9 7.9   RBC 3.72 3.13*   HGB 11.5 9.6*   HCT 39.0 31.1*   .8* 99.4   MCH 30.9 30.7   MCHC 29.5* 30.9*   RDW 15.8* 15.3*    206   MPV 11.3 10.7         Radiology:   XR CHEST PORTABLE   Final Result   No acute cardiopulmonary process. Assessment:  Active Problems:    Uncontrolled type 1 diabetes mellitus with hyperglycemia, with long-term current use of insulin (HCC)    End stage renal disease (HCC)    Hyperkalemia  Resolved Problems:    * No resolved hospital problems. *      Plan:    1. Type I DM with DKA vs HHS  - patient was on insulin drip overnight. Will place on Lantus 10 units bid (new increased outpatient dose) and SSI. 2. ESRD on PD with abdominal pain  - will check cell count with effluent to see if there is evidence of peritonitis. Hold off on antibiotics for now. -Dilaudid pain panel for now. 3. Hyperkalemia  - addressed with PD.    4. Hypothyroidism  -continue levothyroxine    5.  Depression  -continue Abilify and Remeron. NOTE: This report was transcribed using voice recognition software. Every effort was made to ensure accuracy; however, inadvertent computerized transcription errors may be present.      Electronically signed by Faviola Osei MD on 9/8/2021 at 10:15 AM

## 2021-09-08 NOTE — H&P
[facility] History and Physical      CHIEF COMPLAINT:  crampin    History of Present Illness:  Patient is a 72-year-old female with past medical history significant for poorly controlled diabetes, multiple admissions for DKA, end-stage renal disease on peritoneal dialysis, cardiac arrest, CHF, gastroparesis, depression, hypothyroidism, hyperlipidemia, iron deficiency anemia. Presented to the ED complaining of cramping and myalgias that started last night. And then developed nausea vomiting. In the ED she was found to have a blood sugar of 1087, she states she is taking her insulin as scheduled but did not answer what her home blood sugar checks were. Also found to have a potassium of 7, she states she has been taking her dialysis regularly. Nephrology was contacted in ED and she underwent emergent dialysis. Started on insulin drip for HHS and referred for admission. Denies any fever chills, no URTI symptoms, no burning micturition. When seen she was drowsy but readily arousable, states she has not slept all night.     Informant(s) for H&P: Patient    REVIEW OF SYSTEMS:  A comprehensive 14 point review of systems was negative except for: what is in the HPI    PMH:  Past Medical History:   Diagnosis Date    Acute congestive heart failure ([address]     BC (acute kidney injury) ([address] 10/01/2019    Cardiac arrest ([address] 02/15/2021    Cephalgia 10/09/2019    Chronic kidney disease     Depression     Diabetes mellitus ([address]     Diabetic gastroparesis associated with type 1 diabetes mellitus ([address] 12/17/2018    Diabetic ketoacidosis ([address] 08/27/2011    Diabetic ketoacidosis with coma associated with type 1 diabetes mellitus ([address] 06/26/2013    Diabetic polyneuropathy associated with type 1 diabetes mellitus ([address] 12/27/2020    Drug use complicating pregnancy in third trimester     Endocarditis 10/31/2020    ESRD (end stage renal disease) ([address] 09/29/2020    H/O cardiovascular stress test 2021    Lexiscan    [facility]od[facility])     History of blood transfusion 2019    Hyperlipidemia 10/08/2020    Hyperosmolar hyperglycemic state (HHS) ([facility] 2020    Hypothyroidism 10/08/2020    Iron deficiency anemia 10/01/2019    MDRO (multiple drug resistant organisms) resistance     MRSA (methicillin resistant Staphylococcus aureus)     back wound abcess    Non compliance w medication regimen 2016    Other disorders of kidney and ureter     Pregnancy 2016    16 weeks    Previous  delivery affecting pregnancy, antepartum 2017    Previous stillbirth or demise, antepartum 2016    Seizure ([facility] 2020    Severe pre-eclampsia in third trimester 2016    Shock liver 02/15/2021    Valvular endocarditis 11/10/2020    This Diagnosis was added to the Problem List based on transcribed orders from Dr. Luiz Han          Surgical History:  Past Surgical History:   Procedure Laterality Date    BACK SURGERY      abscess     SECTION      x2    CHOLECYSTECTOMY, LAPAROSCOPIC N/A 2019    CHOLECYSTECTOMY LAPAROSCOPIC performed by Ulysses Silva, MD at [address] [place] [address] N/A 2018    COLONOSCOPY WITH BIOPSY performed by Dennys Webster MD at [address] [facility] N/A 2018    COLONOSCOPY WITH BIOPSY performed by Mindy Franz MD at [address] [place] [address] ECHO COMPL [address]  2012    EF 57%    ECHO COMPL W DOP COLOR FLOW  6/10/2013         EMBOLECTOMY N/A 2020    ANGIOVAC, VEGECTOMY, YULISSA -- REQS ROOM 3 performed by Evaristo Whitt MD at [address] [place] [address] Left 2021    LEFT LEG INCISION AND DRAINAGE, DEBRIDEMENT, WOUND VAC APPLICATION performed by Ema Mooney DPM at [address] [place] [address] Left 2021    LEFT LEG DEBRIDEMENT BIOPSY POSS APPLICATION WOUND VAC performed by Ema Mooney DPM at [place] [address] N/A 2020    LAPAROSCOPIC INSERTION PERITONEAL DIALYSIS CATHETER performed by Lilliana Murrieta MD at [facility] ESOPHAGOGASTRODUODENOSCOPY TRANSORAL DIAGNOSTIC N/A 5/30/2018    EGD ESOPHAGOGASTRODUODENOSCOPY performed by Emelyn Bauer MD at [address] [facility] TRANSESOPHAGEAL ECHOCARDIOGRAM N/A 10/19/2020    TRANSESOPHAGEAL ECHOCARDIOGRAM WITH BUBBLE STUDY performed by Malcolm Damon MD at [address] [facility] TUNNELED VENOUS PORT PLACEMENT  04/2018    UPPER GASTROINTESTINAL ENDOSCOPY  12/18/2018    EGD BIOPSY performed by Mary Sheth MD at [address] 10/11/2019    EGD ESOPHAGOGASTRODUODENOSCOPY [facility]ccallum DO [facility] [address] [facility] N/A 7/14/2021    EGD BIOPSY performed by Carlie Stover MD at [facility]       Medications Prior to Admission:    Prior to Admission medications    Medication Sig Start Date End Date Taking? Authorizing Provider   vitamin D (ERGOCALCIFEROL) 1.25 MG (97448 UT) CAPS capsule Take 1 capsule by mouth once a week 9/3/21  Yes Dar Vickers MD   metoprolol tartrate (LOPRESSOR) 25 MG tablet Take 25 mg by mouth 2 times daily   Yes Historical Provider, MD   bumetanide (BUMEX) 1 MG tablet Take 2 mg by mouth 2 times daily   Yes Historical Provider, MD   insulin glargine (LANTUS) 100 UNIT/ML injection vial Inject 7 Units into the skin daily New change in dose and frequency 7/3/21  Yes Dar Vickers MD   sevelamer (RENVELA) 800 MG tablet Take 1 tablet by mouth 3 times daily (with meals) New lower dose 7/2/21  Yes Dar Vickers MD   insulin lispro, 1 Unit Dial, (HUMALOG KWIKPEN) 100 UNIT/ML SOPN Inject 3 units with meals + sliding scale. MAX 30U/day 6/1/21  Yes Dennie Star, MD   Insulin Pen Needle (BD PEN NEEDLE NAV U/F) 32G X 4 MM MISC Uses with insulin 4 times a day 6/1/21  Yes Dennie Star, MD   blood glucose monitor strips Freestyle Lite Strips.  Checks 4 times/day before meals and at bedtime and as needed for symptoms of irregular blood glucose. 6/1/21  Yes Romulo Kidd MD   mirtazapine (REMERON) 15 MG tablet Take 15 mg by mouth nightly   Yes Historical Provider, MD   hydrOXYzine (ATARAX) 25 MG tablet Take 25 mg by mouth daily   Yes Historical Provider, MD   polyethylene glycol (GLYCOLAX) 17 g packet Take 17 g by mouth daily as needed for Constipation   Yes Historical Provider, MD   ARIPiprazole (ABILIFY) 5 MG tablet Take 5 mg by mouth nightly 4/18/21  Yes Historical Provider, MD   Glucagon rDNA, (GLUCAGON EMERGENCY IJ) Inject as directed   Yes Historical Provider, MD   glucose (GLUTOSE) 40 % GEL Take 15 g by mouth See Admin Instructions   Yes Historical Provider, MD   pantoprazole (PROTONIX) 40 MG tablet Take 1 tablet by mouth every morning (before breakfast) 4/19/21  Yes Jacinda Littlejohn DO   levothyroxine (SYNTHROID) 75 MCG tablet TAKE 1 TABLET BY MOUTH IN THE MORNING BEFORE BREAKFAST 4/19/21  Yes Jacinda Littlejohn DO   epoetin nena-epbx (RETACRIT) 3000 UNIT/ML SOLN injection Inject 1 mL into the skin three times a week 3/1/21  Yes Faviola Osei MD   rOPINIRole (REQUIP) 0.25 MG tablet Take 0.25 mg by mouth nightly   Yes Historical Provider, MD   blood glucose test strips (CONTOUR NEXT TEST) strip 1 each by In Vitro route 5 times daily As needed. 12/14/20  Yes Romulo Kidd MD   metoclopramide (REGLAN) 5 MG tablet Take 5 mg by mouth 3 times daily    Yes Historical Provider, MD   pregabalin (LYRICA) 25 MG capsule Take 1 capsule by mouth daily for 30 days. 7/16/21 8/26/21  Faviola Osei MD   folic acid (FOLVITE) 1 MG tablet Take 1 tablet by mouth daily 5/19/21 8/26/21  Blair Whittington MD       Allergies:    Cefepime and Toradol [ketorolac tromethamine]    Social History:    reports that she quit smoking about 7 months ago. Her smoking use included cigarettes. She has a 3.00 pack-year smoking history.  She has never used smokeless tobacco. She reports that she does not drink alcohol and does not use drugs. Family History:   family history includes Asthma in her brother and mother; Diabetes in her mother; High Blood Pressure in her father and mother; Hypertension in her mother. PHYSICAL EXAM:  Vitals:  /89   Pulse 104   Temp 98.1 °F (36.7 °C) (Oral)   Resp 12   Ht 5' 4\" (1.626 m)   Wt 147 lb (66.7 kg)   SpO2 91%   BMI 25.23 kg/m²   General Appearance: Sleepy and oriented to person, place and time and in no acute distress  Skin: warm and dry, turgor not diminished  Head: normocephalic and atraumatic  Eyes: pupils equal, round, and reactive to light, extraocular eye movements intact, conjunctivae normal  Neck: neck supple and non tender without mass   Pulmonary/Chest: clear to auscultation bilaterally- no wheezes, rales or rhonchi, normal air movement, no respiratory distress  Cardiovascular: normal rate, normal S1 and S2 and no M/R/R  Abdomen: soft, non-tender, non-distended, normal bowel sounds, undergoing peritoneal dialysis  Extremities: no cyanosis, no clubbing and no edema  Neurologic: no cranial nerve deficit and speech normal        LABS:  Recent Labs     09/07/21 2253 09/08/21  0154   *  --    K 7.0* 5.7*   CL 85*  --    CO2 22  --    BUN 43*  --    CREATININE 7.4*  --    GLUCOSE 1,158* 1,087*   CALCIUM 7.8*  --        Recent Labs     09/07/21 2253 09/08/21  0400   WBC 8.9 7.9   RBC 3.72 3.13*   HGB 11.5 9.6*   HCT 39.0 31.1*   .8* 99.4   MCH 30.9 30.7   MCHC 29.5* 30.9*   RDW 15.8* 15.3*    206   MPV 11.3 10.7       No results for input(s): POCGLU in the last 72 hours. Radiology:   XR CHEST PORTABLE   Final Result   No acute cardiopulmonary process. EKG: No acute normality    ASSESSMENT:    Hyperkalemia  ESRD on peritoneal dialysis  HHS  Hypothyroidism  Moderate pulmonary hypertension      PLAN:  Hyperkalemia: # underwent urgent peritoneal dialysis. Nephrology on board, continue to monitor.   Insulin drip for HHS    HHS: Reports she has been adherent to insulin regimen. No evidence of infection. - on Insulin drip, continue per protocol. Hypothyroidism: Resume Synthroid    Depression: Resume Abilify and Remeron    DVT prophylaxis: Heparin      NOTE: This report was transcribed using voice recognition software. Every effort was made to ensure accuracy; however, inadvertent computerized transcription errors may be present.   Electronically signed by Shannon Reardon MD on 9/8/2021 at 4:27 AM

## 2021-09-09 LAB
ANION GAP SERPL CALCULATED.3IONS-SCNC: 10 MMOL/L (ref 7–16)
ANION GAP SERPL CALCULATED.3IONS-SCNC: 8 MMOL/L (ref 7–16)
ANION GAP SERPL CALCULATED.3IONS-SCNC: 9 MMOL/L (ref 7–16)
ANION GAP SERPL CALCULATED.3IONS-SCNC: 9 MMOL/L (ref 7–16)
BUN BLDV-MCNC: 27 MG/DL (ref 6–20)
BUN BLDV-MCNC: 29 MG/DL (ref 6–20)
BUN BLDV-MCNC: 29 MG/DL (ref 6–20)
BUN BLDV-MCNC: 31 MG/DL (ref 6–20)
CALCIUM SERPL-MCNC: 6.6 MG/DL (ref 8.6–10.2)
CALCIUM SERPL-MCNC: 7.4 MG/DL (ref 8.6–10.2)
CALCIUM SERPL-MCNC: 7.7 MG/DL (ref 8.6–10.2)
CALCIUM SERPL-MCNC: 7.7 MG/DL (ref 8.6–10.2)
CHLORIDE BLD-SCNC: 101 MMOL/L (ref 98–107)
CHLORIDE BLD-SCNC: 102 MMOL/L (ref 98–107)
CHLORIDE BLD-SCNC: 103 MMOL/L (ref 98–107)
CHLORIDE BLD-SCNC: 103 MMOL/L (ref 98–107)
CO2: 19 MMOL/L (ref 22–29)
CO2: 22 MMOL/L (ref 22–29)
CO2: 22 MMOL/L (ref 22–29)
CO2: 23 MMOL/L (ref 22–29)
CREAT SERPL-MCNC: 5.4 MG/DL (ref 0.5–1)
CREAT SERPL-MCNC: 6.1 MG/DL (ref 0.5–1)
CREAT SERPL-MCNC: 6.2 MG/DL (ref 0.5–1)
CREAT SERPL-MCNC: 6.3 MG/DL (ref 0.5–1)
GFR AFRICAN AMERICAN: 10
GFR AFRICAN AMERICAN: 10
GFR AFRICAN AMERICAN: 11
GFR AFRICAN AMERICAN: 9
GFR NON-AFRICAN AMERICAN: 10 ML/MIN/1.73
GFR NON-AFRICAN AMERICAN: 10 ML/MIN/1.73
GFR NON-AFRICAN AMERICAN: 11 ML/MIN/1.73
GFR NON-AFRICAN AMERICAN: 9 ML/MIN/1.73
GLUCOSE BLD-MCNC: 165 MG/DL (ref 74–99)
GLUCOSE BLD-MCNC: 79 MG/DL (ref 74–99)
GLUCOSE BLD-MCNC: 94 MG/DL (ref 74–99)
GLUCOSE BLD-MCNC: 97 MG/DL (ref 74–99)
MAGNESIUM: 1.5 MG/DL (ref 1.6–2.6)
MAGNESIUM: 1.6 MG/DL (ref 1.6–2.6)
MAGNESIUM: 1.7 MG/DL (ref 1.6–2.6)
MAGNESIUM: 1.8 MG/DL (ref 1.6–2.6)
METER GLUCOSE: 114 MG/DL (ref 74–99)
METER GLUCOSE: 116 MG/DL (ref 74–99)
METER GLUCOSE: 127 MG/DL (ref 74–99)
METER GLUCOSE: 53 MG/DL (ref 74–99)
METER GLUCOSE: 56 MG/DL (ref 74–99)
METER GLUCOSE: 73 MG/DL (ref 74–99)
METER GLUCOSE: 83 MG/DL (ref 74–99)
PHOSPHORUS: 3.8 MG/DL (ref 2.5–4.5)
PHOSPHORUS: 4.6 MG/DL (ref 2.5–4.5)
PHOSPHORUS: 4.7 MG/DL (ref 2.5–4.5)
PHOSPHORUS: 4.7 MG/DL (ref 2.5–4.5)
POTASSIUM SERPL-SCNC: 4.9 MMOL/L (ref 3.5–5)
POTASSIUM SERPL-SCNC: 5.2 MMOL/L (ref 3.5–5)
POTASSIUM SERPL-SCNC: 5.5 MMOL/L (ref 3.5–5)
POTASSIUM SERPL-SCNC: 6.2 MMOL/L (ref 3.5–5)
SODIUM BLD-SCNC: 131 MMOL/L (ref 132–146)
SODIUM BLD-SCNC: 131 MMOL/L (ref 132–146)
SODIUM BLD-SCNC: 134 MMOL/L (ref 132–146)
SODIUM BLD-SCNC: 135 MMOL/L (ref 132–146)

## 2021-09-09 PROCEDURE — 90945 DIALYSIS ONE EVALUATION: CPT

## 2021-09-09 PROCEDURE — 6370000000 HC RX 637 (ALT 250 FOR IP): Performed by: INTERNAL MEDICINE

## 2021-09-09 PROCEDURE — 2500000003 HC RX 250 WO HCPCS: Performed by: STUDENT IN AN ORGANIZED HEALTH CARE EDUCATION/TRAINING PROGRAM

## 2021-09-09 PROCEDURE — 6360000002 HC RX W HCPCS

## 2021-09-09 PROCEDURE — 36415 COLL VENOUS BLD VENIPUNCTURE: CPT

## 2021-09-09 PROCEDURE — 6360000002 HC RX W HCPCS: Performed by: INTERNAL MEDICINE

## 2021-09-09 PROCEDURE — 2580000003 HC RX 258

## 2021-09-09 PROCEDURE — 36592 COLLECT BLOOD FROM PICC: CPT

## 2021-09-09 PROCEDURE — 82962 GLUCOSE BLOOD TEST: CPT

## 2021-09-09 PROCEDURE — 1200000000 HC SEMI PRIVATE

## 2021-09-09 PROCEDURE — 84100 ASSAY OF PHOSPHORUS: CPT

## 2021-09-09 PROCEDURE — 80048 BASIC METABOLIC PNL TOTAL CA: CPT

## 2021-09-09 PROCEDURE — 99233 SBSQ HOSP IP/OBS HIGH 50: CPT | Performed by: INTERNAL MEDICINE

## 2021-09-09 PROCEDURE — 6370000000 HC RX 637 (ALT 250 FOR IP)

## 2021-09-09 PROCEDURE — 83735 ASSAY OF MAGNESIUM: CPT

## 2021-09-09 RX ORDER — LOPERAMIDE HYDROCHLORIDE 2 MG/1
4 CAPSULE ORAL 3 TIMES DAILY PRN
Status: DISCONTINUED | OUTPATIENT
Start: 2021-09-09 | End: 2021-09-15 | Stop reason: HOSPADM

## 2021-09-09 RX ORDER — OXYCODONE HYDROCHLORIDE AND ACETAMINOPHEN 5; 325 MG/1; MG/1
1 TABLET ORAL EVERY 4 HOURS PRN
Status: DISCONTINUED | OUTPATIENT
Start: 2021-09-09 | End: 2021-09-11

## 2021-09-09 RX ADMIN — SEVELAMER CARBONATE 800 MG: 800 TABLET, FILM COATED ORAL at 09:20

## 2021-09-09 RX ADMIN — Medication 10 ML: at 22:49

## 2021-09-09 RX ADMIN — HYDROMORPHONE HYDROCHLORIDE 0.25 MG: 1 INJECTION, SOLUTION INTRAMUSCULAR; INTRAVENOUS; SUBCUTANEOUS at 11:38

## 2021-09-09 RX ADMIN — LOPERAMIDE HYDROCHLORIDE 4 MG: 2 CAPSULE ORAL at 17:42

## 2021-09-09 RX ADMIN — OXYCODONE AND ACETAMINOPHEN 1 TABLET: 5; 325 TABLET ORAL at 22:46

## 2021-09-09 RX ADMIN — METOPROLOL TARTRATE 25 MG: 25 TABLET, FILM COATED ORAL at 09:21

## 2021-09-09 RX ADMIN — HEPARIN SODIUM 5000 UNITS: 5000 INJECTION INTRAVENOUS; SUBCUTANEOUS at 06:30

## 2021-09-09 RX ADMIN — POTASSIUM CHLORIDE, DEXTROSE MONOHYDRATE AND SODIUM CHLORIDE: 150; 5; 450 INJECTION, SOLUTION INTRAVENOUS at 06:11

## 2021-09-09 RX ADMIN — HYDROMORPHONE HYDROCHLORIDE 0.5 MG: 1 INJECTION, SOLUTION INTRAMUSCULAR; INTRAVENOUS; SUBCUTANEOUS at 04:46

## 2021-09-09 RX ADMIN — SEVELAMER CARBONATE 800 MG: 800 TABLET, FILM COATED ORAL at 16:28

## 2021-09-09 RX ADMIN — HEPARIN SODIUM 5000 UNITS: 5000 INJECTION INTRAVENOUS; SUBCUTANEOUS at 14:26

## 2021-09-09 RX ADMIN — ARIPIPRAZOLE 5 MG: 5 TABLET ORAL at 22:39

## 2021-09-09 RX ADMIN — HEPARIN SODIUM 5000 UNITS: 5000 INJECTION INTRAVENOUS; SUBCUTANEOUS at 22:51

## 2021-09-09 RX ADMIN — LOPERAMIDE HYDROCHLORIDE 4 MG: 2 CAPSULE ORAL at 22:46

## 2021-09-09 RX ADMIN — HYDROMORPHONE HYDROCHLORIDE 0.5 MG: 1 INJECTION, SOLUTION INTRAMUSCULAR; INTRAVENOUS; SUBCUTANEOUS at 00:25

## 2021-09-09 RX ADMIN — FOLIC ACID 1 MG: 1 TABLET ORAL at 09:21

## 2021-09-09 RX ADMIN — PANTOPRAZOLE SODIUM 40 MG: 40 TABLET, DELAYED RELEASE ORAL at 06:28

## 2021-09-09 RX ADMIN — SEVELAMER CARBONATE 800 MG: 800 TABLET, FILM COATED ORAL at 11:57

## 2021-09-09 RX ADMIN — MIRTAZAPINE 15 MG: 15 TABLET, FILM COATED ORAL at 22:38

## 2021-09-09 RX ADMIN — METOPROLOL TARTRATE 25 MG: 25 TABLET, FILM COATED ORAL at 22:39

## 2021-09-09 RX ADMIN — DEXTROSE MONOHYDRATE 12.5 G: 25 INJECTION, SOLUTION INTRAVENOUS at 06:21

## 2021-09-09 RX ADMIN — OXYCODONE AND ACETAMINOPHEN 1 TABLET: 5; 325 TABLET ORAL at 17:41

## 2021-09-09 RX ADMIN — ARIPIPRAZOLE 5 MG: 5 TABLET ORAL at 00:25

## 2021-09-09 RX ADMIN — LEVOTHYROXINE SODIUM 75 MCG: 75 TABLET ORAL at 06:28

## 2021-09-09 ASSESSMENT — PAIN SCALES - GENERAL
PAINLEVEL_OUTOF10: 9
PAINLEVEL_OUTOF10: 9
PAINLEVEL_OUTOF10: 8
PAINLEVEL_OUTOF10: 6
PAINLEVEL_OUTOF10: 8
PAINLEVEL_OUTOF10: 9
PAINLEVEL_OUTOF10: 4

## 2021-09-09 ASSESSMENT — PAIN DESCRIPTION - LOCATION: LOCATION: ABDOMEN

## 2021-09-09 ASSESSMENT — PAIN DESCRIPTION - DESCRIPTORS: DESCRIPTORS: ACHING;CONSTANT;DISCOMFORT

## 2021-09-09 ASSESSMENT — PAIN DESCRIPTION - PROGRESSION
CLINICAL_PROGRESSION: NOT CHANGED
CLINICAL_PROGRESSION: NOT CHANGED

## 2021-09-09 ASSESSMENT — PAIN DESCRIPTION - PAIN TYPE: TYPE: ACUTE PAIN

## 2021-09-09 ASSESSMENT — PAIN DESCRIPTION - FREQUENCY: FREQUENCY: CONTINUOUS

## 2021-09-09 ASSESSMENT — PAIN DESCRIPTION - ORIENTATION: ORIENTATION: LOWER;MID

## 2021-09-09 ASSESSMENT — PAIN DESCRIPTION - ONSET: ONSET: ON-GOING

## 2021-09-09 NOTE — PROGRESS NOTES
Concsult:  Insulin pump is not working, needs new sensor. Please contact and inform Dr Jessenia Ortez of above and obtain plan of care-inpatient vs outpatient   Spoke to office about consult. Electronically signed by John Gauthier on 9/9/21 at 11:06 AM EDT   Office called back  DOES NOT have privlileges at [facility], they will see Miss Amber Islas as Outpatient. We will need to call and make an appt upon discharge. Electronically signed by John Gauthier on 9/9/21 at 1:00 PM

## 2021-09-09 NOTE — PLAN OF CARE
Problem: Falls - Risk of:  Goal: Will remain free from falls  Description: Will remain free from falls  Outcome: Met This Shift  Goal: Absence of physical injury  Description: Absence of physical injury  Outcome: Met This Shift     Problem: Skin Integrity:  Goal: Will show no infection signs and symptoms  Description: Will show no infection signs and symptoms  Outcome: Met This Shift  Goal: Absence of new skin breakdown  Description: Absence of new skin breakdown  Outcome: Met This Shift     Problem: Pain:  Goal: Pain level will decrease  Description: Pain level will decrease  Outcome: Met This Shift

## 2021-09-09 NOTE — PROGRESS NOTES
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 Upon arrival to ER her blood glucose was found to be 1087 and her potassium 7. Patient was started on insulin infusion and emergent PD initiated in ER.  We are consulted for hyperkalemia and ESRD on PD.    9/9/21:  Patient is alert and feels better today, tolerated CCPD well  reports sensor for her insulin pump being nonfunctional    Current Medications:    Current Facility-Administered Medications: dextrose 5 % and 0.45 % NaCl with KCl 20 mEq infusion, , IntraVENous, Continuous PRN  dextrose 50 % IV solution, 12.5 g, IntraVENous, PRN  sodium chloride flush 0.9 % injection 5-40 mL, 5-40 mL, IntraVENous, 2 times per day  sodium chloride flush 0.9 % injection 10 mL, 10 mL, IntraVENous, PRN  0.9 % sodium chloride infusion, 25 mL, IntraVENous, PRN  heparin (porcine) injection 5,000 Units, 5,000 Units, SubCUTAneous, 3 times per day  ondansetron (ZOFRAN-ODT) disintegrating tablet 4 mg, 4 mg, Oral, Q8H PRN **OR** ondansetron (ZOFRAN) injection 4 mg, 4 mg, IntraVENous, Q6H PRN  polyethylene glycol (GLYCOLAX) packet 17 g, 17 g, Oral, Daily PRN  acetaminophen (TYLENOL) tablet 650 mg, 650 mg, Oral, Q6H PRN **OR** acetaminophen (TYLENOL) suppository 650 mg, 650 mg, Rectal, Q6H PRN  pantoprazole (PROTONIX) tablet 40 mg, 40 mg, Oral, QAM AC  folic acid (FOLVITE) tablet 1 mg, 1 mg, Oral, Daily  levothyroxine (SYNTHROID) tablet 75 mcg, 75 mcg, Oral, Daily  metoprolol tartrate (LOPRESSOR) tablet 25 mg, 25 mg, Oral, BID  sevelamer (RENVELA) tablet 800 mg, 800 mg, Oral, TID WC  mirtazapine (REMERON) tablet 15 mg, 15 mg, Oral, Nightly  ARIPiprazole (ABILIFY) tablet 5 mg, 5 mg, Oral, Nightly  insulin glargine (LANTUS) injection vial 10 Units, 10 Units, SubCUTAneous, BID  HYDROmorphone HCl PF (DILAUDID) injection 0.25 mg, 0.25 mg, IntraVENous, Q3H PRN **OR** HYDROmorphone HCl PF (DILAUDID) injection 0.5 mg, 0.5 mg, IntraVENous, Q3H PRN  hydrALAZINE (APRESOLINE) injection 10 mg, 10 mg, IntraVENous, Q6H PRN  dextrose 50 % IV solution, 12.5 g, IntraVENous, PRN  insulin lispro (HUMALOG) injection vial 0-6 Units, 0-6 Units, SubCUTAneous, TID WC  insulin lispro (HUMALOG) injection vial 0-3 Units, 0-3 Units, SubCUTAneous, Nightly  albuterol (PROVENTIL) nebulizer solution 10 mg, 10 mg, Nebulization, Once  Allergies:  Cefepime and Toradol [ketorolac tromethamine]  PHYSICAL EXAM:      Vitals:    VITALS:  BP (!) 145/77   Pulse 80   Temp 96 °F (35.6 °C)   Resp 18   Ht 5' 4\" (1.626 m)   Wt 145 lb 15.1 oz (66.2 kg)   SpO2 99%   BMI 25.05 kg/m²   24HR INTAKE/OUTPUT:      Intake/Output Summary (Last 24 hours) at 9/9/2021 1020  Last data filed at 9/9/2021 0842  Gross per 24 hour   Intake 1545 ml   Output 1925 ml   Net -380 ml       PD Catheter Exam:  PD catheter exit site clean    Constitutional: Awake in no acute distress  HEENT:  Normocephalic, PERRL  Respiratory: Decreased breath sounds on the basis  Cardiovascular/Edema:  RRR, S1/S2  Gastrointestinal:  Soft, PD catheter exit site clean   Neurologic:  Nonfocal, AVELAR  Skin:  Warm, dry, no lesions  Other:  no edema     DATA:    CBC:   Lab Results   Component Value Date    WBC 7.9 09/08/2021    RBC 3.13 09/08/2021    HGB 9.6 09/08/2021    HCT 31.1 09/08/2021    MCV 99.4 09/08/2021    MCH 30.7 09/08/2021    MCHC 30.9 09/08/2021    RDW 15.3 09/08/2021     09/08/2021    MPV 10.7 09/08/2021     CMP:    Lab Results   Component Value Date     09/09/2021    K 5.2 09/09/2021    K 4.0 08/26/2021     09/09/2021    CO2 23 09/09/2021    BUN 29 09/09/2021    CREATININE 6.2 09/09/2021    GFRAA 10 09/09/2021    LABGLOM 10 09/09/2021    GLUCOSE 79 09/09/2021    GLUCOSE 130 05/18/2012    PROT 5.7 09/07/2021    LABALBU 2.5 09/07/2021    LABALBU 4.1 05/18/2012    CALCIUM 7.7 09/09/2021    BILITOT <0.2 09/07/2021    ALKPHOS 251 09/07/2021    AST 80 09/07/2021    ALT 41 09/07/2021     Magnesium:    Lab Results   Component Value Date    MG 1.7 09/09/2021     Phosphorus:    Lab Results   Component Value Date    PHOS 4.7 09/09/2021     Radiology Review:         CXR 9/08/2021   No acute cardiopulmonary process.               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 Upon arrival to ER her blood glucose was found to be 1087 and her potassium 7. Patient was started on insulin infusion and emergent PD initiated in ER. We are consulted for hyperkalemia and ESRD on PD.    1. ESRD on PD, to continue CCPD. Total  mls. Effluent drainage clear pale yellow today  2. Hyperkalemia, emergent PD initiated, hyperkalemia protocol initiated and albuterol administered, potassium levels improved. 3. DKA, initial blood glucose 1087, improved and no longer on insulin drip  4. HTN, on carvedilol  5. MBD of CKD, on sevelamer at home  6. Anemia of CKD, on epoetin alpha at home  -------------------------------------------------------------------  5.  History of

## 2021-09-09 NOTE — PROGRESS NOTES
Patients blood sugar was 56 and 53. Pt given orange juice and 12.5ml of D50. Recheck was done 15 minutes later, blood sugar is now 126.

## 2021-09-09 NOTE — CARE COORDINATION
9/9/2021 1043 CM note: Negative covid 9/7/21. Met with patient for transition of care needs. Pt resides with her mother and 2 children(ages 4 and 5) in 2 story house,2 step entry with rail. She uses 1st level only. Pt's mother cares for pt's children during hospitalizations. Pt reports she is fairly independent with ADLs and drives. She owns a wc,shower chair,BSC and glucometer/supplies. Pt is independent with peritoneal dialysis at home and cycles at night/ supplier is [employer]. PCP is Dr Danielle Marshall and she uses [facility]. Hx of [facility], [facility]. Pt plans to return home upon dc, her mother will provide ride. She is currenly using jennie hugger but may benefit from PT/OT greta to assist with transition of care needs. Will await endocrinology input regarding broken insulin pump sensor. Mia ALVAREZ      Advance Care Planning   Healthcare Decision Maker:    Primary Decision Maker (Active): Oswaldo Garcia [facility] - [phone]    Click here to complete Healthcare Decision Makers including selection of the Healthcare Decision Maker Relationship (ie \"Primary\").     Readmission Assessment  Number of Days since last admission?: 8-30 days  Previous Disposition: Home with Family  Who is being Interviewed: Patient  What was the patient's/caregiver's perception as to why they think they needed to return back to the hospital?: Other (Comment) (cramping)  Did you visit your Primary Care Physician after you left the hospital, before you returned this time?: No  Why weren't you able to visit your PCP?: Had no transportation  Did you see a specialist, such as Cardiac, Pulmonary, Orthopedic Physician, etc. after you left the hospital?: No  Who advised the patient to return to the hospital?: Self-referral  Does the patient report anything that got in the way of taking their medications?: No  In our efforts to provide the best possible care to you and others like you, can you think of anything that we could have done to help you after you left the hospital the first time, so that you might not have needed to return so soon?: Other (Comment) (no, plans to return home)

## 2021-09-09 NOTE — PROGRESS NOTES
CCPD initiated using aseptic technique. First drain completed without complication. Dressing changed.

## 2021-09-09 NOTE — PROGRESS NOTES
CCPD complete. Total  mls. Effluent drainage clear pale yellow. Stay safe cap applied using aseptic technique.

## 2021-09-09 NOTE — PLAN OF CARE
Problem: Falls - Risk of:  Goal: Will remain free from falls  Description: Will remain free from falls  9/9/2021 1031 by Niki Leach RN  Outcome: Met This Shift  9/9/2021 0502 by Luz Maria Abernathy RN  Outcome: Met This Shift  Goal: Absence of physical injury  Description: Absence of physical injury  9/9/2021 1031 by Niki Leach RN  Outcome: Met This Shift  9/9/2021 0502 by Luz Maria Abernathy RN  Outcome: Met This Shift     Problem: Falls - Risk of:  Goal: Will remain free from falls  Description: Will remain free from falls  9/9/2021 1031 by Niki Leach RN  Outcome: Met This Shift  9/9/2021 0502 by Luz Maria Abernathy RN  Outcome: Met This Shift  Goal: Absence of physical injury  Description: Absence of physical injury  9/9/2021 1031 by Niki Leach RN  Outcome: Met This Shift  9/9/2021 0502 by uLz Maria Abernathy RN  Outcome: Met This Shift     Problem: Skin Integrity:  Goal: Will show no infection signs and symptoms  Description: Will show no infection signs and symptoms  9/9/2021 1031 by Niki Leach RN  Outcome: Met This Shift  9/9/2021 0502 by Luz Maria Abernathy RN  Outcome: Met This Shift  Goal: Absence of new skin breakdown  Description: Absence of new skin breakdown  9/9/2021 1031 by Niki Leach RN  Outcome: Met This Shift  9/9/2021 0502 by Luz Maria Abernathy RN  Outcome: Met This Shift     Problem: Skin Integrity:  Goal: Will show no infection signs and symptoms  Description: Will show no infection signs and symptoms  9/9/2021 1031 by Niki Leach RN  Outcome: Met This Shift  9/9/2021 0502 by Luz Maria Abernathy RN  Outcome: Met This Shift  Goal: Absence of new skin breakdown  Description: Absence of new skin breakdown  9/9/2021 1031 by Niki Leach RN  Outcome: Met This Shift  9/9/2021 0502 by Luz Maria Abernathy RN  Outcome: Met This Shift     Problem: Pain:  Goal: Pain level will decrease  Description: Pain level will decrease  9/9/2021 1031 by Kevin Gomez RN  Outcome: Met This Shift  9/9/2021 0502 by Brandi Cheney RN  Outcome: Met This Shift  Goal: Control of acute pain  Description: Control of acute pain  9/9/2021 1031 by Kevin Gomez RN  Outcome: Met This Shift  9/9/2021 0502 by Brandi Cheney RN  Outcome: Met This Shift  Goal: Control of chronic pain  Description: Control of chronic pain  9/9/2021 1031 by Kevin Gomez RN  Outcome: Met This Shift  9/9/2021 0502 by Brandi Cheney RN  Outcome: Met This Shift     Problem: Pain:  Goal: Pain level will decrease  Description: Pain level will decrease  9/9/2021 1031 by Kevin Gomez RN  Outcome: Met This Shift  9/9/2021 0502 by Brandi Cheney RN  Outcome: Met This Shift  Goal: Control of acute pain  Description: Control of acute pain  9/9/2021 1031 by Kevin Gomez RN  Outcome: Met This Shift  9/9/2021 0502 by Brandi Cheney RN  Outcome: Met This Shift  Goal: Control of chronic pain  Description: Control of chronic pain  9/9/2021 1031 by Kevin Gomez RN  Outcome: Met This Shift  9/9/2021 0502 by Brandi Cheney RN  Outcome: Met This Shift

## 2021-09-09 NOTE — PROGRESS NOTES
CCPD initiated using aseptic technique. Pale yellow effluent noted. Pt refused dressing change. First drain and fill completed without issue.

## 2021-09-09 NOTE — PROGRESS NOTES
Spoke with Dr. Gui Nassar nurse regarding patient's missing sensor for her continuous glucose monitor. Joe Pool states she would call me back when she had more information. Joe Pool then called back and states that patient had spoken with monitor company regarding missin sensor, and that insurance would not provide coverage. The cost would be $160 and patient is aware. Updated doctor of conversation. Black Machado RN

## 2021-09-09 NOTE — PROGRESS NOTES
[address] [place], [place] [address]ist   Progress Note    Admitting Date and Time: 9/7/2021 10:04 PM  Admit Dx: Hyperkalemia [E87.5]  Hyperglycemia [R73.9]  Uncontrolled type 1 diabetes mellitus with hyperglycemia ([address] [E10.65]    Subjective:    9/8: Pt stating having a lot of pain in abdomen. 9/9: Patient states he is having some abdominal cramping thinks she may be starting her period. She was asking for pain medication we discussed transitioning her to oral meds after 1 more dose of Dilaudid    Per RN: Blood sugar low this morning and patient did not given her labs. Temperature was also 94 so Wade hugger was ordered and temp up to 97.        sodium chloride flush  5-40 mL IntraVENous 2 times per day    heparin (porcine)  5,000 Units SubCUTAneous 3 times per day    pantoprazole  40 mg Oral QAM AC    folic acid  1 mg Oral Daily    levothyroxine  75 mcg Oral Daily    metoprolol tartrate  25 mg Oral BID    sevelamer  800 mg Oral TID WC    mirtazapine  15 mg Oral Nightly    ARIPiprazole  5 mg Oral Nightly    insulin glargine  10 Units SubCUTAneous BID    insulin lispro  0-6 Units SubCUTAneous TID WC    insulin lispro  0-3 Units SubCUTAneous Nightly    albuterol  10 mg Nebulization Once     dextrose 5% and 0.45% NaCl with KCl 20 mEq, , Continuous PRN  dextrose, 12.5 g, PRN  sodium chloride flush, 10 mL, PRN  sodium chloride, 25 mL, PRN  ondansetron, 4 mg, Q8H PRN   Or  ondansetron, 4 mg, Q6H PRN  polyethylene glycol, 17 g, Daily PRN  acetaminophen, 650 mg, Q6H PRN   Or  acetaminophen, 650 mg, Q6H PRN  HYDROmorphone, 0.25 mg, Q3H PRN   Or  HYDROmorphone, 0.5 mg, Q3H PRN  hydrALAZINE, 10 mg, Q6H PRN  dextrose, 12.5 g, PRN         Objective:    BP (!) 145/77   Pulse 80   Temp 97.5 °F (36.4 °C) (Oral)   Resp 18   Ht 5' 4\" (1.626 m)   Wt 145 lb 15.1 oz (66.2 kg)   SpO2 99%   BMI 25.05 kg/m²   General Appearance: alert and in no acute distress  Skin: warm and dry  Head: normocephalic and atraumatic  Eyes: pupils equal, round, and reactive to light, extraocular eye movements intact, conjunctivae normal  Neck: neck supple and non tender without mass   Pulmonary/Chest: clear to auscultation bilaterally- no wheezes, rales or rhonchi, normal air movement, no respiratory distress  Cardiovascular: normal rate, normal S1 and S2 and no carotid bruits  Abdomen: soft, mild diffuse, non-distended, normal bowel sounds, no masses or organomegaly  Extremities: no cyanosis, no clubbing and no edema  Neurologic: no cranial nerve deficit and speech normal      Recent Labs     09/08/21  1908 09/09/21  0030 09/09/21  0445   * 134 135   K 5.3* 4.9 5.2*   CL 95* 103 102   CO2 22 22 23   BUN 35* 31* 29*   CREATININE 6.3* 6.3* 6.2*   GLUCOSE 253* 165* 79   CALCIUM 7.7* 7.4* 7.7*       Recent Labs     09/07/21  2253   ALKPHOS 251*   PROT 5.7*   LABALBU 2.5*   BILITOT <0.2   AST 80*   ALT 41*       Recent Labs     09/07/21  2253 09/08/21  0400   WBC 8.9 7.9   RBC 3.72 3.13*   HGB 11.5 9.6*   HCT 39.0 31.1*   .8* 99.4   MCH 30.9 30.7   MCHC 29.5* 30.9*   RDW 15.8* 15.3*    206   MPV 11.3 10.7         COVID-19, Rapid [[id number]] Collected: 09/07/21 2325     Specimen: Nasopharyngeal Swab Updated: 09/08/21 0008      SARS-CoV-2, NAAT Not Detected       Radiology:   XR CHEST PORTABLE   Final Result   No acute cardiopulmonary process. Assessment:  Active Problems:    Uncontrolled type 1 diabetes mellitus with hyperglycemia, with long-term current use of insulin (HCC)    End stage renal disease (HCC)    Hyperkalemia  Resolved Problems:    * No resolved hospital problems. *      Plan:    1. Type I DM with DKA vs HHS  - patient was on insulin drip overnight. Yesterday, place on Lantus 10 units bid (new increased outpatient dose) and SSI.   -blood sugar low this morning so Lantus held.   -patient not using insulin pump as sensor was lost when she came in as cardiac arrest back in Feb 2021.    2. ESRD on PD with abdominal pain  - will check cell count with effluent to see if there is evidence of peritonitis. Hold off on antibiotics for now. This was not sent yesterday. Will reorder for tomorrow.  -Dilaudid pain panel yesterday but after dose this morning change to Percocet 5/325 mg q6 prn for pain. 3. Hyperkalemia  - addressed with PD with K improving from 7.0-->5.1. Remains elevated above 5 at 5.2.    4. Hypothyroidism  -continue levothyroxine    5. Depression  -continue Abilify and Remeron. 6. Disposition  -home likely in next 24 hours. Case discussed with nurse at bedside. NOTE: This report was transcribed using voice recognition software. Every effort was made to ensure accuracy; however, inadvertent computerized transcription errors may be present.      Electronically signed by Criss Jalloh MD on 9/9/2021 at 12:17 PM

## 2021-09-10 ENCOUNTER — APPOINTMENT (OUTPATIENT)
Dept: GENERAL RADIOLOGY | Age: 31
DRG: 425 | End: 2021-09-10
Payer: COMMERCIAL

## 2021-09-10 LAB
ANION GAP SERPL CALCULATED.3IONS-SCNC: 7 MMOL/L (ref 7–16)
ANION GAP SERPL CALCULATED.3IONS-SCNC: 8 MMOL/L (ref 7–16)
ANION GAP SERPL CALCULATED.3IONS-SCNC: 9 MMOL/L (ref 7–16)
ANION GAP SERPL CALCULATED.3IONS-SCNC: 9 MMOL/L (ref 7–16)
APPEARANCE FLUID: CLEAR
BASOPHILS ABSOLUTE: 0.09 E9/L (ref 0–0.2)
BASOPHILS RELATIVE PERCENT: 1.3 % (ref 0–2)
BUN BLDV-MCNC: 26 MG/DL (ref 6–20)
BUN BLDV-MCNC: 26 MG/DL (ref 6–20)
BUN BLDV-MCNC: 27 MG/DL (ref 6–20)
BUN BLDV-MCNC: 28 MG/DL (ref 6–20)
BUN BLDV-MCNC: 29 MG/DL (ref 6–20)
BUN BLDV-MCNC: 29 MG/DL (ref 6–20)
BUN BLDV-MCNC: 30 MG/DL (ref 6–20)
CALCIUM SERPL-MCNC: 6.9 MG/DL (ref 8.6–10.2)
CALCIUM SERPL-MCNC: 7.1 MG/DL (ref 8.6–10.2)
CALCIUM SERPL-MCNC: 7.2 MG/DL (ref 8.6–10.2)
CALCIUM SERPL-MCNC: 7.2 MG/DL (ref 8.6–10.2)
CALCIUM SERPL-MCNC: 7.3 MG/DL (ref 8.6–10.2)
CALCIUM SERPL-MCNC: 7.3 MG/DL (ref 8.6–10.2)
CALCIUM SERPL-MCNC: 7.4 MG/DL (ref 8.6–10.2)
CELL COUNT FLUID TYPE: NORMAL
CHLORIDE BLD-SCNC: 100 MMOL/L (ref 98–107)
CHLORIDE BLD-SCNC: 101 MMOL/L (ref 98–107)
CHLORIDE BLD-SCNC: 102 MMOL/L (ref 98–107)
CHLORIDE BLD-SCNC: 99 MMOL/L (ref 98–107)
CHLORIDE BLD-SCNC: 99 MMOL/L (ref 98–107)
CO2: 19 MMOL/L (ref 22–29)
CO2: 20 MMOL/L (ref 22–29)
CO2: 21 MMOL/L (ref 22–29)
CO2: 22 MMOL/L (ref 22–29)
CO2: 23 MMOL/L (ref 22–29)
COLOR FLUID: COLORLESS
CREAT SERPL-MCNC: 5.5 MG/DL (ref 0.5–1)
CREAT SERPL-MCNC: 5.7 MG/DL (ref 0.5–1)
CREAT SERPL-MCNC: 5.8 MG/DL (ref 0.5–1)
CREAT SERPL-MCNC: 5.8 MG/DL (ref 0.5–1)
CREAT SERPL-MCNC: 6 MG/DL (ref 0.5–1)
CREAT SERPL-MCNC: 6.1 MG/DL (ref 0.5–1)
CREAT SERPL-MCNC: 6.2 MG/DL (ref 0.5–1)
EOSINOPHILS ABSOLUTE: 0.6 E9/L (ref 0.05–0.5)
EOSINOPHILS RELATIVE PERCENT: 8.8 % (ref 0–6)
GFR AFRICAN AMERICAN: 10
GFR AFRICAN AMERICAN: 11
GFR AFRICAN AMERICAN: 11
GFR NON-AFRICAN AMERICAN: 10 ML/MIN/1.73
GFR NON-AFRICAN AMERICAN: 11 ML/MIN/1.73
GFR NON-AFRICAN AMERICAN: 11 ML/MIN/1.73
GLUCOSE BLD-MCNC: 160 MG/DL (ref 74–99)
GLUCOSE BLD-MCNC: 180 MG/DL (ref 74–99)
GLUCOSE BLD-MCNC: 189 MG/DL (ref 74–99)
GLUCOSE BLD-MCNC: 62 MG/DL (ref 74–99)
GLUCOSE BLD-MCNC: 88 MG/DL (ref 74–99)
GLUCOSE BLD-MCNC: 89 MG/DL (ref 74–99)
GLUCOSE BLD-MCNC: 96 MG/DL (ref 74–99)
HCT VFR BLD CALC: 30.3 % (ref 34–48)
HEMOGLOBIN: 10 G/DL (ref 11.5–15.5)
IMMATURE GRANULOCYTES #: 0.02 E9/L
IMMATURE GRANULOCYTES %: 0.3 % (ref 0–5)
LYMPHOCYTES ABSOLUTE: 2.09 E9/L (ref 1.5–4)
LYMPHOCYTES RELATIVE PERCENT: 30.6 % (ref 20–42)
MAGNESIUM: 1.6 MG/DL (ref 1.6–2.6)
MAGNESIUM: 1.6 MG/DL (ref 1.6–2.6)
MAGNESIUM: 1.7 MG/DL (ref 1.6–2.6)
MAGNESIUM: 1.7 MG/DL (ref 1.6–2.6)
MAGNESIUM: 1.8 MG/DL (ref 1.6–2.6)
MCH RBC QN AUTO: 31.4 PG (ref 26–35)
MCHC RBC AUTO-ENTMCNC: 33 % (ref 32–34.5)
MCV RBC AUTO: 95.3 FL (ref 80–99.9)
METER GLUCOSE: 140 MG/DL (ref 74–99)
METER GLUCOSE: 188 MG/DL (ref 74–99)
METER GLUCOSE: 73 MG/DL (ref 74–99)
METER GLUCOSE: 85 MG/DL (ref 74–99)
METER GLUCOSE: 91 MG/DL (ref 74–99)
MONOCYTE, FLUID: 77 %
MONOCYTES ABSOLUTE: 0.31 E9/L (ref 0.1–0.95)
MONOCYTES RELATIVE PERCENT: 4.5 % (ref 2–12)
NEUTROPHIL, FLUID: 23 %
NEUTROPHILS ABSOLUTE: 3.73 E9/L (ref 1.8–7.3)
NEUTROPHILS RELATIVE PERCENT: 54.5 % (ref 43–80)
NUCLEATED CELLS FLUID: 13 /UL
PDW BLD-RTO: 14.4 FL (ref 11.5–15)
PHOSPHORUS: 4.3 MG/DL (ref 2.5–4.5)
PHOSPHORUS: 4.3 MG/DL (ref 2.5–4.5)
PHOSPHORUS: 4.4 MG/DL (ref 2.5–4.5)
PHOSPHORUS: 4.4 MG/DL (ref 2.5–4.5)
PHOSPHORUS: 4.7 MG/DL (ref 2.5–4.5)
PHOSPHORUS: 5.2 MG/DL (ref 2.5–4.5)
PHOSPHORUS: 5.5 MG/DL (ref 2.5–4.5)
PLATELET # BLD: 204 E9/L (ref 130–450)
PMV BLD AUTO: 11 FL (ref 7–12)
POTASSIUM SERPL-SCNC: 5.8 MMOL/L (ref 3.5–5)
POTASSIUM SERPL-SCNC: 6 MMOL/L (ref 3.5–5)
POTASSIUM SERPL-SCNC: 6.3 MMOL/L (ref 3.5–5)
POTASSIUM SERPL-SCNC: 6.4 MMOL/L (ref 3.5–5)
POTASSIUM SERPL-SCNC: 6.4 MMOL/L (ref 3.5–5)
POTASSIUM SERPL-SCNC: 6.5 MMOL/L (ref 3.5–5)
POTASSIUM SERPL-SCNC: 6.7 MMOL/L (ref 3.5–5)
RBC # BLD: 3.18 E12/L (ref 3.5–5.5)
RBC FLUID: <2000 /UL
SODIUM BLD-SCNC: 128 MMOL/L (ref 132–146)
SODIUM BLD-SCNC: 128 MMOL/L (ref 132–146)
SODIUM BLD-SCNC: 130 MMOL/L (ref 132–146)
SODIUM BLD-SCNC: 131 MMOL/L (ref 132–146)
WBC # BLD: 6.8 E9/L (ref 4.5–11.5)

## 2021-09-10 PROCEDURE — 73630 X-RAY EXAM OF FOOT: CPT

## 2021-09-10 PROCEDURE — 90945 DIALYSIS ONE EVALUATION: CPT

## 2021-09-10 PROCEDURE — 99233 SBSQ HOSP IP/OBS HIGH 50: CPT | Performed by: INTERNAL MEDICINE

## 2021-09-10 PROCEDURE — 89051 BODY FLUID CELL COUNT: CPT

## 2021-09-10 PROCEDURE — 2500000003 HC RX 250 WO HCPCS: Performed by: INTERNAL MEDICINE

## 2021-09-10 PROCEDURE — 84100 ASSAY OF PHOSPHORUS: CPT

## 2021-09-10 PROCEDURE — 6370000000 HC RX 637 (ALT 250 FOR IP): Performed by: INTERNAL MEDICINE

## 2021-09-10 PROCEDURE — 1200000000 HC SEMI PRIVATE

## 2021-09-10 PROCEDURE — 97161 PT EVAL LOW COMPLEX 20 MIN: CPT | Performed by: PHYSICAL THERAPIST

## 2021-09-10 PROCEDURE — 80048 BASIC METABOLIC PNL TOTAL CA: CPT

## 2021-09-10 PROCEDURE — 6370000000 HC RX 637 (ALT 250 FOR IP)

## 2021-09-10 PROCEDURE — 85025 COMPLETE CBC W/AUTO DIFF WBC: CPT

## 2021-09-10 PROCEDURE — 83735 ASSAY OF MAGNESIUM: CPT

## 2021-09-10 PROCEDURE — 2580000003 HC RX 258

## 2021-09-10 PROCEDURE — 82962 GLUCOSE BLOOD TEST: CPT

## 2021-09-10 PROCEDURE — 6360000002 HC RX W HCPCS

## 2021-09-10 PROCEDURE — 36415 COLL VENOUS BLD VENIPUNCTURE: CPT

## 2021-09-10 RX ADMIN — SODIUM ZIRCONIUM CYCLOSILICATE 10 G: 10 POWDER, FOR SUSPENSION ORAL at 09:04

## 2021-09-10 RX ADMIN — Medication 10 ML: at 21:27

## 2021-09-10 RX ADMIN — PANTOPRAZOLE SODIUM 40 MG: 40 TABLET, DELAYED RELEASE ORAL at 06:13

## 2021-09-10 RX ADMIN — LOPERAMIDE HYDROCHLORIDE 4 MG: 2 CAPSULE ORAL at 09:08

## 2021-09-10 RX ADMIN — SEVELAMER CARBONATE 800 MG: 800 TABLET, FILM COATED ORAL at 13:43

## 2021-09-10 RX ADMIN — METOPROLOL TARTRATE 25 MG: 25 TABLET, FILM COATED ORAL at 21:27

## 2021-09-10 RX ADMIN — Medication 10 ML: at 09:04

## 2021-09-10 RX ADMIN — OXYCODONE AND ACETAMINOPHEN 1 TABLET: 5; 325 TABLET ORAL at 09:04

## 2021-09-10 RX ADMIN — HEPARIN SODIUM 5000 UNITS: 5000 INJECTION INTRAVENOUS; SUBCUTANEOUS at 22:14

## 2021-09-10 RX ADMIN — ARIPIPRAZOLE 5 MG: 5 TABLET ORAL at 21:26

## 2021-09-10 RX ADMIN — SEVELAMER CARBONATE 800 MG: 800 TABLET, FILM COATED ORAL at 09:04

## 2021-09-10 RX ADMIN — HEPARIN SODIUM 5000 UNITS: 5000 INJECTION INTRAVENOUS; SUBCUTANEOUS at 13:55

## 2021-09-10 RX ADMIN — OXYCODONE AND ACETAMINOPHEN 1 TABLET: 5; 325 TABLET ORAL at 22:14

## 2021-09-10 RX ADMIN — MIRTAZAPINE 15 MG: 15 TABLET, FILM COATED ORAL at 21:27

## 2021-09-10 RX ADMIN — METOPROLOL TARTRATE 25 MG: 25 TABLET, FILM COATED ORAL at 09:04

## 2021-09-10 RX ADMIN — SEVELAMER CARBONATE 800 MG: 800 TABLET, FILM COATED ORAL at 17:06

## 2021-09-10 RX ADMIN — HEPARIN SODIUM 5000 UNITS: 5000 INJECTION INTRAVENOUS; SUBCUTANEOUS at 06:14

## 2021-09-10 RX ADMIN — LEVOTHYROXINE SODIUM 75 MCG: 75 TABLET ORAL at 06:14

## 2021-09-10 RX ADMIN — OXYCODONE AND ACETAMINOPHEN 1 TABLET: 5; 325 TABLET ORAL at 17:56

## 2021-09-10 RX ADMIN — FOLIC ACID 1 MG: 1 TABLET ORAL at 09:04

## 2021-09-10 RX ADMIN — OXYCODONE AND ACETAMINOPHEN 1 TABLET: 5; 325 TABLET ORAL at 13:45

## 2021-09-10 RX ADMIN — DEXTROSE MONOHYDRATE 12.5 G: 25 INJECTION, SOLUTION INTRAVENOUS at 06:25

## 2021-09-10 ASSESSMENT — PAIN SCALES - GENERAL
PAINLEVEL_OUTOF10: 7
PAINLEVEL_OUTOF10: 2
PAINLEVEL_OUTOF10: 8
PAINLEVEL_OUTOF10: 0

## 2021-09-10 ASSESSMENT — PAIN DESCRIPTION - ORIENTATION: ORIENTATION: MID;LEFT;LOWER

## 2021-09-10 ASSESSMENT — PAIN DESCRIPTION - PAIN TYPE: TYPE: ACUTE PAIN

## 2021-09-10 ASSESSMENT — PAIN DESCRIPTION - ONSET: ONSET: ON-GOING

## 2021-09-10 ASSESSMENT — PAIN DESCRIPTION - PROGRESSION: CLINICAL_PROGRESSION: NOT CHANGED

## 2021-09-10 ASSESSMENT — PAIN DESCRIPTION - DESCRIPTORS: DESCRIPTORS: ACHING;CONSTANT;DISCOMFORT

## 2021-09-10 ASSESSMENT — PAIN DESCRIPTION - FREQUENCY: FREQUENCY: CONTINUOUS

## 2021-09-10 ASSESSMENT — PAIN DESCRIPTION - LOCATION: LOCATION: FOOT;ABDOMEN

## 2021-09-10 NOTE — PROGRESS NOTES
Notified Dr. Dank Camarillo that patient blood sugar was 62, given Dextrose 50, then recheck was 73. Food and drink given, doctor said to recheck in one hour.

## 2021-09-10 NOTE — CARE COORDINATION
9/10/2021 1048 CM note: Negative covid 9/7/21. Pt reports she is fairly independent with ADLs and drives. She owns a wc,shower chair,BSC and glucometer/supplies. Pt is independent with peritoneal dialysis at home and cycles at night/ supplier is [employer]. Pt plans to return home upon dc, her mother will provide ride. Will await PT/OT evals to assist with transition of care needs.  Mia ALVAREZ

## 2021-09-10 NOTE — PROGRESS NOTES
CCPD initiated using aseptic technique. Dressing changed. Pt draining without issue. Aseptic technique applied.

## 2021-09-10 NOTE — CONSULTS
Consult dictated  Assessment:  Non displaced proximal phalanx fractures toes 2-5 - treat non operatively  Type I DM with neuropathy  ESRD    Plan:  Surgical shoe immobilization  Discussed with nursing and primary team  No plans for invasive intervention. Thank you for consulting my service.     Shirley Caballero DPM FACFAS  Fellowship-Trained Foot and Ankle Surgeon  Diplomate, [employer] Board of Foot and Ankle Surgeons  [phone]

## 2021-09-10 NOTE — PROGRESS NOTES
Date:9/10/2021  Patient Name: Nyla Ryder  MRN: [id number]  : 1990  ROOM #: [id number]    Occupational Therapy order received, chart reviewed and evaluation attempted this date. Patient is unavailable for OT evaluation due to politely refusing this afternoon. Pt states she has just returned to bed after sitting up for a while. Will attempt OT evaluation at a later time. Thank you.    Dali Palmer OTR/L #[id number]

## 2021-09-10 NOTE — PROGRESS NOTES
[address] [place], [place] [address]   Progress Note    Admitting Date and Time: 9/7/2021 10:04 PM  Admit Dx: Hyperkalemia [E87.5]  Hyperglycemia [R73.9]  Uncontrolled type 1 diabetes mellitus with hyperglycemia ([facility] [E10.65]    Subjective:    9/8: Pt stating having a lot of pain in abdomen. 9/9: Patient states he is having some abdominal cramping thinks she may be starting her period. She was asking for pain medication we discussed transitioning her to oral meds after 1 more dose of Dilaudid    9/10: Nephrology was in earlier today and discoloration of patient's toes was brought to his attention. He was concerned that this was a vascular issue. However, on my examination this appears to be may be early cellulitis as there is quite a bit of swelling in the foot and toes. X-ray ordered and podiatry consult. Per RN: PD fluid sent today.       sodium zirconium cyclosilicate  10 g Oral Daily    sodium chloride flush  5-40 mL IntraVENous 2 times per day    heparin (porcine)  5,000 Units SubCUTAneous 3 times per day    pantoprazole  40 mg Oral QAM AC    folic acid  1 mg Oral Daily    levothyroxine  75 mcg Oral Daily    metoprolol tartrate  25 mg Oral BID    sevelamer  800 mg Oral TID WC    mirtazapine  15 mg Oral Nightly    ARIPiprazole  5 mg Oral Nightly    insulin glargine  10 Units SubCUTAneous BID    insulin lispro  0-6 Units SubCUTAneous TID WC    insulin lispro  0-3 Units SubCUTAneous Nightly    albuterol  10 mg Nebulization Once     loperamide, 4 mg, TID PRN  oxyCODONE-acetaminophen, 1 tablet, Q4H PRN  dextrose 5% and 0.45% NaCl with KCl 20 mEq, , Continuous PRN  dextrose, 12.5 g, PRN  sodium chloride flush, 10 mL, PRN  sodium chloride, 25 mL, PRN  ondansetron, 4 mg, Q8H PRN   Or  ondansetron, 4 mg, Q6H PRN  polyethylene glycol, 17 g, Daily PRN  acetaminophen, 650 mg, Q6H PRN   Or  acetaminophen, 650 mg, Q6H PRN  hydrALAZINE, 10 mg, Q6H PRN  dextrose, 12.5 g, PRN Objective:    BP (!) 165/95   Pulse 101   Temp 98 °F (36.7 °C) (Oral)   Resp 18   Ht 5' 4\" (1.626 m)   Wt 171 lb 9.6 oz (77.8 kg)   SpO2 100%   BMI 29.46 kg/m²   General Appearance: alert and in no acute distress  Skin: warm and dry  Head: normocephalic and atraumatic  Eyes: pupils equal, round, and reactive to light, extraocular eye movements intact, conjunctivae normal  Neck: neck supple and non tender without mass   Pulmonary/Chest: clear to auscultation bilaterally- no wheezes, rales or rhonchi, normal air movement, no respiratory distress  Cardiovascular: normal rate, normal S1 and S2 and no carotid bruits  Abdomen: soft, mild diffuse, non-distended, normal bowel sounds, no masses or organomegaly  Extremities: left foot instep with 2+edema; 2nd and 3rd toes appear hyperpigmented and tender to touch. Neurologic: no cranial nerve deficit and speech normal      Recent Labs     09/10/21  0420 09/10/21  0900 09/10/21  1330   * 130* 131*   K 6.5* 5.8* 6.0*    102 101   CO2 21* 21* 21*   BUN 29* 26* 26*   CREATININE 6.0* 5.5* 5.7*   GLUCOSE 62* 89 189*   CALCIUM 7.3* 6.9* 7.2*       Recent Labs     09/07/21 2253   ALKPHOS 251*   PROT 5.7*   LABALBU 2.5*   BILITOT <0.2   AST 80*   ALT 41*       Recent Labs     09/07/21  2253 09/08/21  0400 09/10/21  0620   WBC 8.9 7.9 6.8   RBC 3.72 3.13* 3.18*   HGB 11.5 9.6* 10.0*   HCT 39.0 31.1* 30.3*   .8* 99.4 95.3   MCH 30.9 30.7 31.4   MCHC 29.5* 30.9* 33.0   RDW 15.8* 15.3* 14.4    206 204   MPV 11.3 10.7 11.0       Body Fluid Cell Count with Differential [[id number]] Collected: 09/10/21 1450     Specimen: Peritoneal Dialysis Fluid Updated: 09/10/21 1516      Cell Count Fluid Type Perit. Dialysis      Color, Fluid Colorless      Appearance, Fluid Clear      Nucl Cell, Fluid 13 /uL       RBC, Fluid <2,000 /uL       Neutrophil Count, Fluid 23 %       Monocyte Count, Fluid 77 %          COVID-19, Rapid [[id number]] Collected: 09/07/21 9020   Specimen: Nasopharyngeal Swab Updated: 09/08/21 0008      SARS-CoV-2, NAAT Not Detected       Radiology:   XR CHEST PORTABLE   Final Result   No acute cardiopulmonary process. XR FOOT LEFT (MIN 3 VIEWS)    (Results Pending)       Assessment:  Active Problems:    Uncontrolled type 1 diabetes mellitus with hyperglycemia, with long-term current use of insulin (HCC)    End stage renal disease (HCC)    Hyperkalemia  Resolved Problems:    * No resolved hospital problems. *      Plan:    1. Type I DM with DKA vs HHS  - patient was on insulin drip overnight. , place on Lantus 10 units bid (new increased outpatient dose) and SSI. -blood sugar low this morning so Lantus held.   -patient not using insulin pump as sensor was lost when she came in as cardiac arrest back in Feb 2021. RN     2. ESRD on PD with abdominal pain  - will check cell count with effluent to see if there is evidence of peritonitis. Hold off on antibiotics for now. This was not sent Wed and Thurs but did get sent today and shows no evidence of peritonitis. -Dilaudid pain panel yesterday but after dose this morning change to Percocet 5/325 mg q6 prn for pain. 3. Hyperkalemia  - addressed with PD with K improving from 7.0-->5.1. Remains elevated above 6 and given Lokelma. .    4. Left foot discoloration of toes and edema of foot  - patient with dry tinea and is diabetic so at risk for cellulitis. Will ask podiatry to evaluate. Check xray of left foot to evaluate for soft tissue gas. 5. Hypothyroidism  -continue levothyroxine    6. Depression  -continue Abilify and Remeron. 7. Disposition  -home likely in next 24 hours. Case discussed with nurse on the floor    Case discussed with Dr. Lynn Bertrand of podiatry on the phone. Case discussed with Dr. Lisa Whaley of nephrology over the phone. NOTE: This report was transcribed using voice recognition software.  Every effort was made to ensure accuracy; however, inadvertent computerized transcription errors may be present.      Electronically signed by Argenis Ramirez MD on 9/10/2021 at 4:35 PM

## 2021-09-10 NOTE — PROGRESS NOTES
CCPD completed. Disconnected and staysafe cap applied using aseptic technique. Body fluid sample obtained.  932 pale yellow effluent noted

## 2021-09-10 NOTE — PROGRESS NOTES
Physical Therapy Initial Evaluation/Plan of Care    Room #:  0420/0420-01  Patient Name: Radha Rowland  YOB: 1990  MRN: [id number]    Date of Service: 9/10/2021     Tentative placement recommendation: Home  Equipment recommendation: None      Evaluating Physical Therapist: Isabella Garcia PT #[id number]      Specific Provider Orders/Date/Referring Provider :  09/09/21 1115   PT eval and treat Start: 09/09/21 1115, End: 09/09/21 1115, ONE TIME, Standing Count: 1 Occurrences, Genet Vásquez MD      Admitting Diagnosis:   Hyperkalemia [E87.5]  Hyperglycemia [R73.9]  Uncontrolled type 1 diabetes mellitus with hyperglycemia (HCC) [E10.65]  hyperglycemia and body aches and nausea x 1 day      Visit Diagnoses       Codes    Hyperglycemia   (Inadequately Controlled)   R73.9    Uncontrolled type 1 diabetes mellitus with hyperglycemia (HCC)   (Inadequately Controlled)   E10.65        Surgery: -    Patient Active Problem List   Diagnosis    Non compliance w medication regimen    Tobacco smoking complicating pregnancy    MTHFR mutation    Diabetic ketoacidosis without coma associated with type 1 diabetes mellitus ([facility]    Hypertension    Prolonged QT interval    Iron deficiency anemia    Sinus tachycardia    Bladder dysfunction    Hypokalemia    Elevated troponin    Severe protein-calorie malnutrition (HCC)    Uncontrolled type 1 diabetes mellitus with hyperglycemia, with long-term current use of insulin (HCC)    End stage renal disease (HCC)    Hypothyroidism    Hyperlipidemia    Acute cystitis    Nondisplaced fracture of neck of fifth metacarpal bone, left hand, initial encounter for closed fracture    Chronic right-sided low back pain    Endocarditis    Seizure (HCC)    Hyperkalemia    Hypomagnesemia    Hypocalcemia    Intractable nausea and vomiting    Wound of left leg, sequela    Syncope    Type 1 diabetes mellitus without complication (HCC)    Hyperosmolality    Major depressive disorder    Lactic acid acidosis    Early satiety    Acute on chronic systolic CHF (congestive heart failure) (HCC)    Atypical chest pain        ASSESSMENT of Current Deficits Patient exhibits decreased strength, balance, endurance and pain left foot and stomach impairing functional mobility, gait , gait distance and tolerance to activity receiving dialysis limiting increased gait distance and activity. Patient requires skilled physical therapy to address concerns listed above to increase safety and independence at discharge. PHYSICAL THERAPY  PLAN OF CARE       Physical therapy plan of care is established based on physician order,  patient diagnosis and clinical assessment    Current Treatment Recommendations:    -Standing Balance: Perform strengthening exercises in standing to promote motor control with or without upper extremity support   -Transfers: Provide instruction on proper hand and foot position for adequate transfer of weight onto lower extremities and use of gait device if needed and Cues for hand placement, technique and safety. Provide stabilization to prevent fall   -Gait: Gait training and Standing activities to improve: base of support, weight shift, weight bearing    -Endurance: Utilize Supervised activities to increase level of endurance to allow for safe functional mobility including transfers and gait   -Stairs: Stair training with instruction on proper technique and hand placement on rail    PT long term treatment goals are located in below grid    Patient and or family understand(s) diagnosis, prognosis, and plan of care. Frequency of treatments: Patient will be seen  2-3 times/week.          Prior Level of Function: Patient ambulated independently    Rehab Potential: good    for baseline    Past medical history:   Past Medical History:   Diagnosis Date    Acute congestive heart failure ([facility]     BC (acute kidney injury) ([facility] 10/01/2019    Cardiac arrest ([address] 02/15/2021    Cephalgia 10/09/2019    Chronic kidney disease     Depression     Diabetes mellitus ([address]     Diabetic gastroparesis associated with type 1 diabetes mellitus ([address] 2018    Diabetic ketoacidosis ([address] 2011    Diabetic ketoacidosis with coma associated with type 1 diabetes mellitus ([address] 2013    Diabetic polyneuropathy associated with type 1 diabetes mellitus ([address] 2020    Drug use complicating pregnancy in third trimester     Endocarditis 10/31/2020    ESRD (end stage renal disease) ([address] 2020    H/O cardiovascular stress test 2021    Lexiscan    Hemodialysis patient [facility])     History of blood transfusion 2019    Hyperlipidemia 10/08/2020    Hyperosmolar hyperglycemic state (HHS) ([address] 2020    Hypothyroidism 10/08/2020    Iron deficiency anemia 10/01/2019    MDRO (multiple drug resistant organisms) resistance     MRSA (methicillin resistant Staphylococcus aureus)     back wound abcess    Non compliance w medication regimen 2016    Other disorders of kidney and ureter     Pregnancy 2016    16 weeks    Previous  delivery affecting pregnancy, antepartum 2017    Previous stillbirth or demise, antepartum 2016    Seizure ([address] 2020    Severe pre-eclampsia in third trimester 2016    Shock liver 02/15/2021    Valvular endocarditis 11/10/2020    This Diagnosis was added to the Problem List based on transcribed orders from Dr. Candance Freshwater        Past Surgical History:   Procedure Laterality Date    BACK SURGERY      abscess     SECTION      x2    CHOLECYSTECTOMY, LAPAROSCOPIC N/A 2019    CHOLECYSTECTOMY LAPAROSCOPIC [facility]    COLONOSCOPY WITH BIOPSY performed by Dank Scherer MD at [address] [place] [address] COLONOSCOPY N/A 2018    COLONOSCOPY WITH BIOPSY performed by Aurelia Fox MD at [address] [place] [address] ECHO COMPL W DOP COLOR FLOW  1/31/2012    EF 57%    ECHO COMPL W DOP COLOR FLOW  6/10/2013         EMBOLECTOMY N/A 11/6/2020    [address], VEGECTOMY, YULISSA -- REQS ROOM 3 performed by Georges Perea MD at [address] [place] [address] Left 2/23/2021    LEFT LEG INCISION AND DRAINAGE, DEBRIDEMENT, WOUND VAC APPLICATION performed by Cruz Resendez DPM at [address] [place] [address] Left 5/18/2021    LEFT LEG DEBRIDEMENT BIOPSY POSS APPLICATION WOUND VAC performed by Cruz Resendez DPM at [place] [address] N/A 6/26/2020    LAPAROSCOPIC INSERTION PERITONEAL DIALYSIS CATHETER performed by Jm Cristina MD at [place] [address] ESOPHAGOGASTRODUODENOSCOPY TRANSORAL DIAGNOSTIC N/A 5/30/2018    EGD ESOPHAGOGASTRODUODENOSCOPY performed by Allie Elias MD at [address] [place] [address] TRANSESOPHAGEAL ECHOCARDIOGRAM N/A 10/19/2020    TRANSESOPHAGEAL ECHOCARDIOGRAM WITH BUBBLE STUDY performed by Ketty Childers MD at [address] [place] [address] TUNNELED VENOUS PORT PLACEMENT  04/2018    UPPER GASTROINTESTINAL ENDOSCOPY  12/18/2018    EGD BIOPSY performed by Jody Nolasco MD at [place] [address] 10/11/2019    EGD ESOPHAGOGASTRODUODENOSCOPY performed by Beth Ramírez DO at [place] [address] N/A 7/14/2021    EGD BIOPSY performed by Alexsander Askew MD at [address] [place] [address]:    Precautions: Up as tolerated, falls and dialysis-peritoneal ,    Social history: Patient lives with mother and her 2 children, 3/8 yo in a two story home resides first  with 2 steps  to enter with [employer] owned: Wheelchair, Bedside commode and Shower chair,       [address] [place] [address],[address] Floor Mobility Inpatient   How much difficulty turning over in bed?: None  How much difficulty sitting down on / standing up from a chair with arms?: A Little  How much difficulty moving from lying on back to sitting on side of bed?: None  How much help from another person moving to and from a bed to a chair?: A Little  How much help from another person needed to walk in hospital room?: A Little  How much help from another person for climbing 3-5 steps with a railing?: A Little  AM-PAC Inpatient Mobility Raw Score : 20  AM-PAC Inpatient T-Scale Score : 47.67  Mobility Inpatient CMS 0-100% Score: 35.83  Mobility Inpatient CMS G-Code Modifier : [address] cleared patient for PT evaluation. The admitting diagnosis and active problem list as listed above have been reviewed prior to the initiation of this evaluation. OBJECTIVE;   Initial Evaluation  Date: 9/10/2021 Treatment Date:     Short Term/ Long Term   Goals   Was pt agreeable to Eval/treatment? Yes  To be met in 3 days   Pain level   6/10  Left foot and stomach     Bed Mobility    Rolling: Independent    Supine to sit: Independent    Sit to supine: Independent    Scooting: Independent    Rolling: Independent    Supine to sit:  Independent    Sit to supine: Independent    Scooting: Independent     Transfers Sit to stand: Supervision     Sit to stand: Independent     Ambulation    5 feet using  no device with Supervision        50 feet using  no device with Independent    Stair negotiation: ascended and descended   Not assessed     2 steps 1 rail with supervision    ROM Within functional limits        Strength BUE:  refer to OT eval  RLE:  4-/5  LLE:  4-/5  Increase strength in affected mm groups by 1/3 grade   Balance Sitting EOB:  good    Dynamic Standing:  fair +  Sitting EOB:  good    Dynamic Standing: good        Patient is Alert & Oriented x person, place, time and situation and follows directions    Sensation:  Patient  denies numbness/tingling   Edema:  no   Endurance: fair        Vitals: room air   Blood Pressure at rest  Blood Pressure during session    Heart Rate at rest   Heart Rate during session     SPO2 at rest 100%  SPO2 during session  %     Patient education  Patient educated on role of Physical Therapy, risks of immobility, safety and plan of care,  importance of mobility while in hospital  and positioning for skin integrity and comfort     Patient response to education:   Pt verbalized understanding Pt demonstrated skill Pt requires further education in this area   Yes Partial Yes      Treatment:  Patient practiced and was instructed/facilitated in the following treatment: Patient transferred to edge of bed  Sat edge of bed 5 minutes with No assist to increase dynamic sitting balance and activity tolerance. assisted up to chair with lunch tray. Therapeutic Exercises:  not performed      At end of session, patient in chair with   call light and phone within reach,  all lines and tubes intact, nursing notified. Patient would benefit from continued skilled Physical Therapy to improve functional independence and quality of life. Patient's/ family goals   home    Time in  1132  Time out  1151    Total Treatment Time  0 minutes    Evaluation time includes thorough review of current medical information, gathering information on past medical history/social history and prior level of function, completion of standardized testing/informal observation of tasks, assessment of data, and development of Plan of care and goals.      CPT codes:  Low Complexity PT evaluation (58161)  No treatment    Soledad Schreiber, PT

## 2021-09-10 NOTE — PROGRESS NOTES
Dialysis nurse called out by patients primary nurse at 0100 for Pd machine alarming for the past 7 hours starting at 1830. Pd machine initial phase bypassed and initial fill started.     Pt stable    vss

## 2021-09-10 NOTE — PROGRESS NOTES
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 Upon arrival to ER her blood glucose was found to be 1087 and her potassium 7. Patient was started on insulin infusion and emergent PD initiated in ER.  We are consulted for hyperkalemia and ESRD on PD.    9/9/21:  Patient is alert and feels better today, tolerated CCPD well  reports sensor for her insulin pump being nonfunctional    9/10/21:  Complains of left foot pain    Current Medications:    Current Facility-Administered Medications: sodium zirconium cyclosilicate (LOKELMA) oral suspension 10 g, 10 g, Oral, Daily  loperamide (IMODIUM) capsule 4 mg, 4 mg, Oral, TID PRN  oxyCODONE-acetaminophen (PERCOCET) 5-325 MG per tablet 1 tablet, 1 tablet, Oral, Q4H PRN  dextrose 5 % and 0.45 % NaCl with KCl 20 mEq infusion, , IntraVENous, Continuous PRN  dextrose 50 % IV solution, 12.5 g, IntraVENous, PRN  sodium chloride flush 0.9 % injection 5-40 mL, 5-40 mL, IntraVENous, 2 times per day  sodium chloride flush 0.9 % injection 10 mL, 10 mL, IntraVENous, PRN  0.9 % sodium chloride infusion, 25 mL, IntraVENous, PRN  heparin (porcine) injection 5,000 Units, 5,000 Units, SubCUTAneous, 3 times per day  ondansetron (ZOFRAN-ODT) disintegrating tablet 4 mg, 4 mg, Oral, Q8H PRN **OR** ondansetron (ZOFRAN) injection 4 mg, 4 mg, IntraVENous, Q6H PRN  polyethylene glycol (GLYCOLAX) packet 17 g, 17 g, Oral, Daily PRN  acetaminophen (TYLENOL) tablet 650 mg, 650 mg, Oral, Q6H PRN **OR** acetaminophen (TYLENOL) suppository 650 mg, 650 mg, Rectal, Q6H PRN  pantoprazole (PROTONIX) tablet 40 mg, 40 mg, Oral, QAM AC  folic acid (FOLVITE) tablet 1 mg, 1 mg, Oral, Daily  levothyroxine (SYNTHROID) tablet 75 mcg, 75 mcg, Oral, Daily  metoprolol tartrate (LOPRESSOR) tablet 25 mg, 25 mg, Oral, BID  sevelamer (RENVELA) tablet 800 mg, 800 mg, Oral, TID WC  mirtazapine (REMERON) tablet 15 mg, 15 mg, Oral, Nightly  ARIPiprazole (ABILIFY) tablet 5 mg, 5 mg, Oral, Nightly  insulin glargine (LANTUS) injection vial 10 Units, 10 Units, SubCUTAneous, BID  hydrALAZINE (APRESOLINE) injection 10 mg, 10 mg, IntraVENous, Q6H PRN  dextrose 50 % IV solution, 12.5 g, IntraVENous, PRN  insulin lispro (HUMALOG) injection vial 0-6 Units, 0-6 Units, SubCUTAneous, TID WC  insulin lispro (HUMALOG) injection vial 0-3 Units, 0-3 Units, SubCUTAneous, Nightly  albuterol (PROVENTIL) nebulizer solution 10 mg, 10 mg, Nebulization, Once  Allergies:  Cefepime and Toradol [ketorolac tromethamine]  PHYSICAL EXAM:      Vitals:    VITALS:  BP (!) 165/95   Pulse 101   Temp 98 °F (36.7 °C) (Oral)   Resp 18   Ht 5' 4\" (1.626 m)   Wt 171 lb 9.6 oz (77.8 kg)   SpO2 100%   BMI 29.46 kg/m²   24HR INTAKE/OUTPUT:    No intake or output data in the 24 hours ending 09/10/21 1227    PD Catheter Exam:  PD catheter exit site clean    Constitutional: Awake in no acute distress  HEENT:  Normocephalic, PERRL  Respiratory: Decreased breath sounds on the basis  Cardiovascular/Edema:  RRR, S1/S2  Gastrointestinal:  Soft, PD catheter exit site clean   Neurologic:  Nonfocal, AVELAR  Skin:  Warm, dry, no lesions  Other:  left foot cyanotic    DATA:    CBC:   Lab Results   Component Value Date    WBC 6.8 09/10/2021    RBC 3.18 09/10/2021    HGB 10.0 09/10/2021    HCT 30.3 09/10/2021    MCV 95.3 09/10/2021    MCH 31.4 09/10/2021 MCHC 33.0 09/10/2021    RDW 14.4 09/10/2021     09/10/2021    MPV 11.0 09/10/2021     CMP:    Lab Results   Component Value Date     09/10/2021    K 5.8 09/10/2021    K 4.0 08/26/2021     09/10/2021    CO2 21 09/10/2021    BUN 26 09/10/2021    CREATININE 5.5 09/10/2021    GFRAA 11 09/10/2021    LABGLOM 11 09/10/2021    GLUCOSE 89 09/10/2021    GLUCOSE 130 05/18/2012    PROT 5.7 09/07/2021    LABALBU 2.5 09/07/2021    LABALBU 4.1 05/18/2012    CALCIUM 6.9 09/10/2021    BILITOT <0.2 09/07/2021    ALKPHOS 251 09/07/2021    AST 80 09/07/2021    ALT 41 09/07/2021     Magnesium:    Lab Results   Component Value Date    MG 1.6 09/10/2021     Phosphorus:    Lab Results   Component Value Date    PHOS 4.3 09/10/2021     Radiology Review:         CXR 9/08/2021   No acute cardiopulmonary process.               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 Upon arrival to ER her blood glucose was found to be 1087 and her potassium 7. Patient was started on insulin infusion and emergent PD initiated in ER. We are consulted for hyperkalemia and ESRD on PD.    1. ESRD on PD, to continue CCPD. Total  mls. Effluent drainage clear pale yellow today  2. Hyperkalemia, emergent PD initiated, hyperkalemia protocol initiated and albuterol administered, potassium levels improved. 3. DKA, initial blood glucose 1087, improved and no longer on insulin drip  4. HTN, on carvedilol  5. MBD of CKD, on sevelamer at home  6. Anemia of CKD, on epoetin alpha at home  -------------------------------------------------------------------  5.  History of gastroparesis, on metoclopramide  6. PVD? left foot ischemia    Plan:    · CCPD 6 exchanges over 12 hours with long dwell of 2 L all exchanges 1.5% except final fill of 2.5%. · Monitor labs daily  · Consult vascular surgery for left foot  · Monitor potassium levels  · Repeat BMP daily  · Please call her endocrinologist about malfunctioning insulin pump  · Discussed with social service and RN    Thank you Dr. Sarah Antonio for allowing us to participate in the care of Ms.  [address]    Electronically signed by Miri Cao MD on 9/10/2021 at 12:27 PM

## 2021-09-11 LAB
ANION GAP SERPL CALCULATED.3IONS-SCNC: 6 MMOL/L (ref 7–16)
ANION GAP SERPL CALCULATED.3IONS-SCNC: 7 MMOL/L (ref 7–16)
ANION GAP SERPL CALCULATED.3IONS-SCNC: 8 MMOL/L (ref 7–16)
ANION GAP SERPL CALCULATED.3IONS-SCNC: 8 MMOL/L (ref 7–16)
BASOPHILS ABSOLUTE: 0.09 E9/L (ref 0–0.2)
BASOPHILS RELATIVE PERCENT: 1.6 % (ref 0–2)
BUN BLDV-MCNC: 28 MG/DL (ref 6–20)
BUN BLDV-MCNC: 28 MG/DL (ref 6–20)
BUN BLDV-MCNC: 29 MG/DL (ref 6–20)
BUN BLDV-MCNC: 33 MG/DL (ref 6–20)
CALCIUM SERPL-MCNC: 7 MG/DL (ref 8.6–10.2)
CALCIUM SERPL-MCNC: 7.3 MG/DL (ref 8.6–10.2)
CALCIUM SERPL-MCNC: 7.4 MG/DL (ref 8.6–10.2)
CALCIUM SERPL-MCNC: 7.5 MG/DL (ref 8.6–10.2)
CHLORIDE BLD-SCNC: 102 MMOL/L (ref 98–107)
CHLORIDE BLD-SCNC: 106 MMOL/L (ref 98–107)
CHLORIDE BLD-SCNC: 98 MMOL/L (ref 98–107)
CHLORIDE BLD-SCNC: 99 MMOL/L (ref 98–107)
CO2: 23 MMOL/L (ref 22–29)
CO2: 24 MMOL/L (ref 22–29)
CO2: 25 MMOL/L (ref 22–29)
CO2: 27 MMOL/L (ref 22–29)
CREAT SERPL-MCNC: 5.7 MG/DL (ref 0.5–1)
CREAT SERPL-MCNC: 5.9 MG/DL (ref 0.5–1)
CREAT SERPL-MCNC: 6 MG/DL (ref 0.5–1)
CREAT SERPL-MCNC: 6.2 MG/DL (ref 0.5–1)
EOSINOPHILS ABSOLUTE: 0.49 E9/L (ref 0.05–0.5)
EOSINOPHILS RELATIVE PERCENT: 9 % (ref 0–6)
GFR AFRICAN AMERICAN: 10
GFR AFRICAN AMERICAN: 11
GFR NON-AFRICAN AMERICAN: 10 ML/MIN/1.73
GFR NON-AFRICAN AMERICAN: 11 ML/MIN/1.73
GLUCOSE BLD-MCNC: 121 MG/DL (ref 74–99)
GLUCOSE BLD-MCNC: 223 MG/DL (ref 74–99)
GLUCOSE BLD-MCNC: 230 MG/DL (ref 74–99)
GLUCOSE BLD-MCNC: 234 MG/DL (ref 74–99)
HCT VFR BLD CALC: 29 % (ref 34–48)
HEMOGLOBIN: 9.3 G/DL (ref 11.5–15.5)
IMMATURE GRANULOCYTES #: 0.01 E9/L
IMMATURE GRANULOCYTES %: 0.2 % (ref 0–5)
LYMPHOCYTES ABSOLUTE: 1.82 E9/L (ref 1.5–4)
LYMPHOCYTES RELATIVE PERCENT: 33.3 % (ref 20–42)
MAGNESIUM: 1.7 MG/DL (ref 1.6–2.6)
MAGNESIUM: 1.8 MG/DL (ref 1.6–2.6)
MAGNESIUM: 1.8 MG/DL (ref 1.6–2.6)
MAGNESIUM: 1.9 MG/DL (ref 1.6–2.6)
MCH RBC QN AUTO: 30.5 PG (ref 26–35)
MCHC RBC AUTO-ENTMCNC: 32.1 % (ref 32–34.5)
MCV RBC AUTO: 95.1 FL (ref 80–99.9)
METER GLUCOSE: 110 MG/DL (ref 74–99)
METER GLUCOSE: 159 MG/DL (ref 74–99)
METER GLUCOSE: 178 MG/DL (ref 74–99)
METER GLUCOSE: 190 MG/DL (ref 74–99)
MONOCYTES ABSOLUTE: 0.27 E9/L (ref 0.1–0.95)
MONOCYTES RELATIVE PERCENT: 4.9 % (ref 2–12)
NEUTROPHILS ABSOLUTE: 2.79 E9/L (ref 1.8–7.3)
NEUTROPHILS RELATIVE PERCENT: 51 % (ref 43–80)
PDW BLD-RTO: 14.6 FL (ref 11.5–15)
PHOSPHORUS: 5.4 MG/DL (ref 2.5–4.5)
PHOSPHORUS: 5.4 MG/DL (ref 2.5–4.5)
PHOSPHORUS: 5.5 MG/DL (ref 2.5–4.5)
PHOSPHORUS: 5.8 MG/DL (ref 2.5–4.5)
PLATELET # BLD: 189 E9/L (ref 130–450)
PMV BLD AUTO: 11.2 FL (ref 7–12)
POTASSIUM SERPL-SCNC: 6 MMOL/L (ref 3.5–5)
POTASSIUM SERPL-SCNC: 6.2 MMOL/L (ref 3.5–5)
POTASSIUM SERPL-SCNC: 6.3 MMOL/L (ref 3.5–5)
POTASSIUM SERPL-SCNC: 6.9 MMOL/L (ref 3.5–5)
POTASSIUM SERPL-SCNC: 7.1 MMOL/L (ref 3.5–5)
RBC # BLD: 3.05 E12/L (ref 3.5–5.5)
SODIUM BLD-SCNC: 129 MMOL/L (ref 132–146)
SODIUM BLD-SCNC: 131 MMOL/L (ref 132–146)
SODIUM BLD-SCNC: 134 MMOL/L (ref 132–146)
SODIUM BLD-SCNC: 139 MMOL/L (ref 132–146)
WBC # BLD: 5.5 E9/L (ref 4.5–11.5)

## 2021-09-11 PROCEDURE — 83735 ASSAY OF MAGNESIUM: CPT

## 2021-09-11 PROCEDURE — 36556 INSERT NON-TUNNEL CV CATH: CPT | Performed by: SURGERY

## 2021-09-11 PROCEDURE — 6370000000 HC RX 637 (ALT 250 FOR IP): Performed by: INTERNAL MEDICINE

## 2021-09-11 PROCEDURE — 6370000000 HC RX 637 (ALT 250 FOR IP)

## 2021-09-11 PROCEDURE — 84100 ASSAY OF PHOSPHORUS: CPT

## 2021-09-11 PROCEDURE — 6360000002 HC RX W HCPCS: Performed by: SURGERY

## 2021-09-11 PROCEDURE — 2580000003 HC RX 258: Performed by: INTERNAL MEDICINE

## 2021-09-11 PROCEDURE — 36415 COLL VENOUS BLD VENIPUNCTURE: CPT

## 2021-09-11 PROCEDURE — 99233 SBSQ HOSP IP/OBS HIGH 50: CPT | Performed by: INTERNAL MEDICINE

## 2021-09-11 PROCEDURE — 90935 HEMODIALYSIS ONE EVALUATION: CPT

## 2021-09-11 PROCEDURE — 1200000000 HC SEMI PRIVATE

## 2021-09-11 PROCEDURE — 99254 IP/OBS CNSLTJ NEW/EST MOD 60: CPT | Performed by: SURGERY

## 2021-09-11 PROCEDURE — 82962 GLUCOSE BLOOD TEST: CPT

## 2021-09-11 PROCEDURE — 90945 DIALYSIS ONE EVALUATION: CPT | Performed by: INTERNAL MEDICINE

## 2021-09-11 PROCEDURE — 02HV33Z INSERTION OF INFUSION DEVICE INTO SUPERIOR VENA CAVA, PERCUTANEOUS APPROACH: ICD-10-PCS | Performed by: SURGERY

## 2021-09-11 PROCEDURE — 80048 BASIC METABOLIC PNL TOTAL CA: CPT

## 2021-09-11 PROCEDURE — 6360000002 HC RX W HCPCS

## 2021-09-11 PROCEDURE — 2580000003 HC RX 258

## 2021-09-11 PROCEDURE — 6360000002 HC RX W HCPCS: Performed by: INTERNAL MEDICINE

## 2021-09-11 PROCEDURE — 6370000000 HC RX 637 (ALT 250 FOR IP): Performed by: NURSE PRACTITIONER

## 2021-09-11 PROCEDURE — 85025 COMPLETE CBC W/AUTO DIFF WBC: CPT

## 2021-09-11 PROCEDURE — 5A1D70Z PERFORMANCE OF URINARY FILTRATION, INTERMITTENT, LESS THAN 6 HOURS PER DAY: ICD-10-PCS | Performed by: STUDENT IN AN ORGANIZED HEALTH CARE EDUCATION/TRAINING PROGRAM

## 2021-09-11 PROCEDURE — 36592 COLLECT BLOOD FROM PICC: CPT

## 2021-09-11 PROCEDURE — 84132 ASSAY OF SERUM POTASSIUM: CPT

## 2021-09-11 PROCEDURE — 94640 AIRWAY INHALATION TREATMENT: CPT

## 2021-09-11 RX ORDER — SODIUM POLYSTYRENE SULFONATE 15 G/60ML
15 SUSPENSION ORAL; RECTAL ONCE
Status: COMPLETED | OUTPATIENT
Start: 2021-09-11 | End: 2021-09-11

## 2021-09-11 RX ORDER — ALBUTEROL SULFATE 2.5 MG/3ML
2.5 SOLUTION RESPIRATORY (INHALATION) ONCE
Status: COMPLETED | OUTPATIENT
Start: 2021-09-11 | End: 2021-09-11

## 2021-09-11 RX ORDER — OXYCODONE HYDROCHLORIDE AND ACETAMINOPHEN 5; 325 MG/1; MG/1
1.5 TABLET ORAL EVERY 4 HOURS PRN
Status: DISCONTINUED | OUTPATIENT
Start: 2021-09-11 | End: 2021-09-15 | Stop reason: HOSPADM

## 2021-09-11 RX ORDER — DEXTROSE AND SODIUM CHLORIDE 5; .9 G/100ML; G/100ML
INJECTION, SOLUTION INTRAVENOUS CONTINUOUS
Status: DISCONTINUED | OUTPATIENT
Start: 2021-09-11 | End: 2021-09-13

## 2021-09-11 RX ORDER — MORPHINE SULFATE 4 MG/ML
4 INJECTION, SOLUTION INTRAMUSCULAR; INTRAVENOUS ONCE
Status: COMPLETED | OUTPATIENT
Start: 2021-09-11 | End: 2021-09-11

## 2021-09-11 RX ADMIN — HEPARIN SODIUM 5000 UNITS: 5000 INJECTION INTRAVENOUS; SUBCUTANEOUS at 06:19

## 2021-09-11 RX ADMIN — LOPERAMIDE HYDROCHLORIDE 4 MG: 2 CAPSULE ORAL at 09:31

## 2021-09-11 RX ADMIN — METOPROLOL TARTRATE 25 MG: 25 TABLET, FILM COATED ORAL at 08:05

## 2021-09-11 RX ADMIN — OXYCODONE AND ACETAMINOPHEN 1.5 TABLET: 5; 325 TABLET ORAL at 12:16

## 2021-09-11 RX ADMIN — INSULIN LISPRO 1 UNITS: 100 INJECTION, SOLUTION INTRAVENOUS; SUBCUTANEOUS at 08:11

## 2021-09-11 RX ADMIN — INSULIN LISPRO 1 UNITS: 100 INJECTION, SOLUTION INTRAVENOUS; SUBCUTANEOUS at 12:17

## 2021-09-11 RX ADMIN — OXYCODONE AND ACETAMINOPHEN 1 TABLET: 5; 325 TABLET ORAL at 08:05

## 2021-09-11 RX ADMIN — HEPARIN SODIUM 5000 UNITS: 5000 INJECTION INTRAVENOUS; SUBCUTANEOUS at 12:17

## 2021-09-11 RX ADMIN — PANTOPRAZOLE SODIUM 40 MG: 40 TABLET, DELAYED RELEASE ORAL at 06:18

## 2021-09-11 RX ADMIN — SODIUM ZIRCONIUM CYCLOSILICATE 10 G: 10 POWDER, FOR SUSPENSION ORAL at 16:42

## 2021-09-11 RX ADMIN — OXYCODONE AND ACETAMINOPHEN 1.5 TABLET: 5; 325 TABLET ORAL at 23:15

## 2021-09-11 RX ADMIN — SEVELAMER CARBONATE 800 MG: 800 TABLET, FILM COATED ORAL at 08:04

## 2021-09-11 RX ADMIN — MORPHINE SULFATE 4 MG: 4 INJECTION, SOLUTION INTRAMUSCULAR; INTRAVENOUS at 21:44

## 2021-09-11 RX ADMIN — CALCIUM GLUCONATE 1000 MG: 98 INJECTION, SOLUTION INTRAVENOUS at 21:09

## 2021-09-11 RX ADMIN — DEXTROSE AND SODIUM CHLORIDE: 5; 900 INJECTION, SOLUTION INTRAVENOUS at 21:08

## 2021-09-11 RX ADMIN — SEVELAMER CARBONATE 800 MG: 800 TABLET, FILM COATED ORAL at 12:16

## 2021-09-11 RX ADMIN — SODIUM ZIRCONIUM CYCLOSILICATE 10 G: 10 POWDER, FOR SUSPENSION ORAL at 08:04

## 2021-09-11 RX ADMIN — INSULIN LISPRO 1 UNITS: 100 INJECTION, SOLUTION INTRAVENOUS; SUBCUTANEOUS at 21:18

## 2021-09-11 RX ADMIN — Medication 10 ML: at 08:04

## 2021-09-11 RX ADMIN — ALBUTEROL SULFATE 2.5 MG: 2.5 SOLUTION RESPIRATORY (INHALATION) at 22:25

## 2021-09-11 RX ADMIN — FOLIC ACID 1 MG: 1 TABLET ORAL at 08:05

## 2021-09-11 RX ADMIN — SEVELAMER CARBONATE 800 MG: 800 TABLET, FILM COATED ORAL at 16:42

## 2021-09-11 RX ADMIN — INSULIN GLARGINE 10 UNITS: 100 INJECTION, SOLUTION SUBCUTANEOUS at 08:10

## 2021-09-11 RX ADMIN — LEVOTHYROXINE SODIUM 75 MCG: 75 TABLET ORAL at 06:18

## 2021-09-11 RX ADMIN — SODIUM POLYSTYRENE SULFONATE 15 G: 15 SUSPENSION ORAL; RECTAL at 21:08

## 2021-09-11 RX ADMIN — INSULIN GLARGINE 10 UNITS: 100 INJECTION, SOLUTION SUBCUTANEOUS at 21:18

## 2021-09-11 ASSESSMENT — PAIN SCALES - GENERAL
PAINLEVEL_OUTOF10: 8
PAINLEVEL_OUTOF10: 4
PAINLEVEL_OUTOF10: 6

## 2021-09-11 ASSESSMENT — PAIN DESCRIPTION - DESCRIPTORS: DESCRIPTORS: DISCOMFORT;NAGGING

## 2021-09-11 ASSESSMENT — PAIN DESCRIPTION - PAIN TYPE
TYPE: ACUTE PAIN
TYPE: ACUTE PAIN

## 2021-09-11 ASSESSMENT — PAIN DESCRIPTION - LOCATION
LOCATION: ABDOMEN;GROIN
LOCATION: ABDOMEN

## 2021-09-11 NOTE — FLOWSHEET NOTE
Spoke with Dr. Ninoska Calderon regarding hyperkalemia. New orders to run PD for 24 hours and ordered repeat K+ labs every 4 hours x3. CCPD initiated using aseptic technique. No complications draining or filling noted.

## 2021-09-11 NOTE — PROGRESS NOTES
[address] [place], [place] [address]ist   Progress Note    Admitting Date and Time: 9/7/2021 10:04 PM  Admit Dx: Hyperkalemia [E87.5]  Hyperglycemia [R73.9]  Uncontrolled type 1 diabetes mellitus with hyperglycemia ([facility] [E10.65]    Subjective:    9/8: Pt stating having a lot of pain in abdomen. 9/9: Patient states he is having some abdominal cramping thinks she may be starting her period. She was asking for pain medication we discussed transitioning her to oral meds after 1 more dose of Dilaudid    9/10: Nephrology was in earlier today and discoloration of patient's toes was brought to his attention. He was concerned that this was a vascular issue. However, on my examination this appears to be may be early cellulitis as there is quite a bit of swelling in the foot and toes. X-ray ordered and podiatry consult. 9/11: Patient sitting up in bed she is tearful that she is not going home. Nephrology nurse practitioner at bedside. Per RN: Patient complaining of pain in left foot and wants stronger pain meds.      sodium zirconium cyclosilicate  10 g Oral BID    sodium zirconium cyclosilicate  10 g Oral Daily    sodium chloride flush  5-40 mL IntraVENous 2 times per day    heparin (porcine)  5,000 Units SubCUTAneous 3 times per day    pantoprazole  40 mg Oral QAM AC    folic acid  1 mg Oral Daily    levothyroxine  75 mcg Oral Daily    metoprolol tartrate  25 mg Oral BID    sevelamer  800 mg Oral TID WC    mirtazapine  15 mg Oral Nightly    ARIPiprazole  5 mg Oral Nightly    insulin glargine  10 Units SubCUTAneous BID    insulin lispro  0-6 Units SubCUTAneous TID WC    insulin lispro  0-3 Units SubCUTAneous Nightly    albuterol  10 mg Nebulization Once     oxyCODONE-acetaminophen, 1.5 tablet, Q4H PRN  loperamide, 4 mg, TID PRN  dextrose 5% and 0.45% NaCl with KCl 20 mEq, , Continuous PRN  dextrose, 12.5 g, PRN  sodium chloride flush, 10 mL, PRN  sodium chloride, 25 mL, PRN  ondansetron, 4 mg, Q8H PRN   Or  ondansetron, 4 mg, Q6H PRN  polyethylene glycol, 17 g, Daily PRN  acetaminophen, 650 mg, Q6H PRN   Or  acetaminophen, 650 mg, Q6H PRN  hydrALAZINE, 10 mg, Q6H PRN  dextrose, 12.5 g, PRN         Objective:    BP (!) 157/97   Pulse 90   Temp 97.9 °F (36.6 °C) (Oral)   Resp 18   Ht 5' 4\" (1.626 m)   Wt 173 lb (78.5 kg)   SpO2 98%   BMI 29.70 kg/m²   General Appearance: alert and in no acute distress  Skin: warm and dry  Head: normocephalic and atraumatic  Eyes: pupils equal, round, and reactive to light, extraocular eye movements intact, conjunctivae normal  Neck: neck supple and non tender without mass   Pulmonary/Chest: clear to auscultation bilaterally- no wheezes, rales or rhonchi, normal air movement, no respiratory distress  Cardiovascular: normal rate, normal S1 and S2 and no carotid bruits  Abdomen: soft, mild diffuse, non-distended, normal bowel sounds, no masses or organomegaly  Extremities: left foot in surgical shoe. Neurologic: no cranial nerve deficit and speech normal      Recent Labs     09/11/21  0032 09/11/21  0415 09/11/21  0805   * 134 131*   K 6.3* 6.2* 6.0*   CL 99 102 98   CO2 23 24 25   BUN 29* 28* 28*   CREATININE 5.9* 6.0* 5.7*   GLUCOSE 230* 223* 234*   CALCIUM 7.0* 7.3* 7.4*       No results for input(s): ALKPHOS, PROT, LABALBU, BILITOT, AST, ALT in the last 72 hours. Recent Labs     09/10/21  0620 09/11/21  0615   WBC 6.8 5.5   RBC 3.18* 3.05*   HGB 10.0* 9.3*   HCT 30.3* 29.0*   MCV 95.3 95.1   MCH 31.4 30.5   MCHC 33.0 32.1   RDW 14.4 14.6    189   MPV 11.0 11.2       Body Fluid Cell Count with Differential [[id number]] Collected: 09/10/21 1450     Specimen: Peritoneal Dialysis Fluid Updated: 09/10/21 1516      Cell Count Fluid Type Perit. Dialysis      Color, Fluid Colorless      Appearance, Fluid Clear      Nucl Cell, Fluid 13 /uL       RBC, Fluid <2,000 /uL       Neutrophil Count, Fluid 23 %       Monocyte Count, Fluid 77 %          COVID-19, Rapid [[id number]] Collected: 09/07/21 2325     Specimen: Nasopharyngeal Swab Updated: 09/08/21 0008      SARS-CoV-2, NAAT Not Detected       Radiology:   XR FOOT LEFT (MIN 3 VIEWS)   Final Result   Fractures proximal portions of the proximal phalanx of the 2nd through 5th   digits of unknown acuity. XR CHEST PORTABLE   Final Result   No acute cardiopulmonary process. Assessment:  Active Problems:    Uncontrolled type 1 diabetes mellitus with hyperglycemia, with long-term current use of insulin (HCC)    End stage renal disease (HCC)    Hyperkalemia  Resolved Problems:    * No resolved hospital problems. *      Plan:    1. Type I DM with DKA vs HHS  - patient was on insulin drip overnight. , place on Lantus 10 units bid (new increased outpatient dose) and SSI. Decreased back to 10 units nightly yesterday because persistent hypoglycemia.  -blood sugar low this morning so Lantus held.   -patient not using insulin pump as sensor was lost when she came in as cardiac arrest back in Feb 2021. RN called endocrine office and paperwork in place for new sensor but too soon to get insurance to cover as will be cash and runs appx $160.    2. ESRD on PD with abdominal pain  - will check cell count with effluent to see if there is evidence of peritonitis. Hold off on antibiotics for now. This was not sent Wed and Thurs but did get sent today and shows no evidence of peritonitis. -Dilaudid pain panel intially but stopped after dose 9/9 but after dose this morning change to Percocet 5/325 mg q6 prn for pain. 3. Hyperkalemia  - addressed with PD with K improving from 7.0-->5.1. Remains elevated above 6 and given Lokelma. Nephrology wants her to have 2 doses today and changed her diet to low potassuim but patient is upset about that and tearful. 4. Left foot discoloration of toes and edema of foot  - patient with dry tinea and is diabetic so at risk for cellulitis. Podiatry consulted.  Check xray of left foot to evaluate for soft tissue gas was negative for infection but did reveal fractures of the second through fourth proximal phalanxes. Patient was fitted with a surgical shoe. She does not she cannot remember any specific traumatic event but I did ask her about motor vehicle she drives and she does get in and out of an SUV; stepping out from this higher height can clearly be risk for stress fracture  -Pain not controlled with Percocet 5/325 every 4 we will increase to 7.5 mg    5. Hypothyroidism  -continue levothyroxine    6. Depression  -continue Abilify and Remeron. 7. Disposition  -home likely in next 24 hours. Case discussed with nurse on the floor    Case discussed with podiatry resident on the phone. Case discussed with nephrology CNP on the floor. NOTE: This report was transcribed using voice recognition software. Every effort was made to ensure accuracy; however, inadvertent computerized transcription errors may be present.      Electronically signed by Elmer Johns MD on 9/11/2021 at 12:10 PM

## 2021-09-11 NOTE — CONSULTS
[address]                                  CONSULTATION    PATIENT NAME: Carmen Mckeon                 :        1990  MED REC NO:   [id number]                            ROOM:       0987  ACCOUNT NO:   [de-identified]                           ADMIT DATE: 2021  PROVIDER:     Hakan Sharpe DPM    CONSULT DATE:  09/10/2021    CONSULTANT:  Hakan Sharpe DPM    ATTENDING PHYSICIAN:  Yuly Barrientos MD    CHIEF COMPLAINT:  Left lesser toe pain. HISTORY OF PRESENT ILLNESS:  This is a pleasant 53-year-old female with  complicated past medical history comprising of type 1 diabetes mellitus  with neuropathy; CHF; end-stage renal disease, on peritoneal dialysis. The patient was found to have discoloration to her lesser toes in the  left foot. She has had a history of complicated ulceration on her left  shin which eventually healed with multiple invasive intervention. However, secondary to the issue with the pigmentation, I was consulted  for the aforementioned. PAST MEDICAL HISTORY:  Significant for type 1 diabetes mellitus with  neuropathy, depression, chronic kidney disease, end-stage renal disease,  iron deficiency anemia, seizure disorder, and congestive heart failure. PAST SURGICAL HISTORY:   x2, colonoscopies, embolectomy, and  left leg debridements in 2021 and 2021. MEDICATIONS:  Per med rec. ALLERGIES:  CEFEPIME and TORADOL. SOCIAL HISTORY:  The patient has a three-pack-year smoking history,  although she reports that she quit tobacco approximately seven months  ago. Denies any alcohol or illicit drug use. FAMILY HISTORY:  Asthma in her brother and mother, diabetes in her  mother, and hypertension in her father. PHYSICAL EXAMINATION:  VITAL SIGNS:  Temperature is 98.1, pulse is 104, blood pressure 129/89,  and respiratory rate is 12.   NEUROLOGICAL EXAMINATION: Protective sensation absent to bilateral  lower extremities. Epicritic sensation is otherwise intact. VASCULAR EXAMINATION:  DP and PT pulses are +2. Capillary refill is  brisk at the toes. kin temperature is warm, proximal to distal.  DERMATOLOGICAL EXAMINATION:  Hyperpigmentation noted to the left  anterior shin at the site of previous ulceration and also to the second  lesser toes with associated erythema. MUSCULOSKELETAL EXAMINATION:  Pain to palpation to the base of toes 2,  3, 4, and 5. Range of motion of the lesser MTP joints is limited with  pain. LABORATORY DATA:  Sodium 130, potassium 4.8, chloride 102, bicarb is 21,  glucose 89, BUN is 26, and creatinine is 5.5. WBC 6.8, hemoglobin is  10.0, hematocrit is 30.3, and platelet count is 417. ASSESSMENT:  1.  Non-displaced proximal phalanx fracture of toes 2, 3, 4, and 5, for  now treating them nonoperatively. 2.  Type 1 diabetes mellitus with neuropathy. 3.  End-stage renal disease, on peritoneal dialysis. TREATMENT PLAN AND RECOMMENDATIONS:  Discussed the case extensively with  the nursing and primary team.  At this point, I recommend _____ for  immobilization. No plans for any invasive intervention from my point of  view. Thank you for consulting my service.         Cheyenne Veronica DPM    D: 09/10/2021 17:36:20       T: 09/10/2021 18:52:23     [email or web address]  Job#: [id number]     Doc#: [id number]    CC:

## 2021-09-11 NOTE — PROGRESS NOTES
Department of Podiatry  Resident Progress Note    Subjective  Patient seen bedside for broken toes 2-5 on the left foot . No acute events overnight. Patient denies any N/V/D/F/C/SOB/CP and has no other complaints at this time.      Objective: Patient is seen with surgical shoe on the left foot    Past Medical History:   Diagnosis Date    Acute congestive heart failure ([address]     BC (acute kidney injury) ([address] 10/01/2019    Cardiac arrest ([address] 02/15/2021    Cephalgia 10/09/2019    Chronic kidney disease     Depression     Diabetes mellitus ([address]     Diabetic gastroparesis associated with type 1 diabetes mellitus ([address] 2018    Diabetic ketoacidosis ([address] 2011    Diabetic ketoacidosis with coma associated with type 1 diabetes mellitus ([address] 2013    Diabetic polyneuropathy associated with type 1 diabetes mellitus ([address] 2020    Drug use complicating pregnancy in third trimester     Endocarditis 10/31/2020    ESRD (end stage renal disease) ([address] 2020    H/O cardiovascular stress test 2021    Lexiscan    Hemodialysis patient [facility])     History of blood transfusion 2019    Hyperlipidemia 10/08/2020    Hyperosmolar hyperglycemic state (HHS) ([address] 2020    Hypothyroidism 10/08/2020    Iron deficiency anemia 10/01/2019    MDRO (multiple drug resistant organisms) resistance     MRSA (methicillin resistant Staphylococcus aureus)     back wound abcess    Non compliance w medication regimen 2016    Other disorders of kidney and ureter     Pregnancy 2016    16 weeks    Previous  delivery affecting pregnancy, antepartum 2017    Previous stillbirth or demise, antepartum 2016    Seizure ([address] 2020    Severe pre-eclampsia in third trimester 2016    Shock liver 02/15/2021    Valvular endocarditis 11/10/2020    This Diagnosis was added to the Problem List based on transcribed orders from Dr. Annette Juarez Surgical History:   Procedure Laterality Date    BACK SURGERY      abscess     SECTION      x2    CHOLECYSTECTOMY, LAPAROSCOPIC N/A 2019    CHOLECYSTECTOMY LAPAROSCOPIC performed by Regulo Best MD at [facility] N/A 2018    COLONOSCOPY WITH BIOPSY performed by Cherry Craig MD at [address] [place] [address] COLONOSCOPY N/A 2018    COLONOSCOPY WITH BIOPSY performed by Diego Wray MD at [address]  2012    EF 57%    ECHO COMPL W DOP COLOR FLOW  6/10/2013         EMBOLECTOMY N/A 2020    [address] [place] [address], [id number] [place] [address], YULISSA -- REQS ROOM 3 performed by Nicko Larios MD at [address] [place] [address] Left 2021    LEFT LEG INCISION AND DRAINAGE, DEBRIDEMENT, WOUND VAC APPLICATION performed by Juany Snow DPM at [address] [place] [address] Left 2021    LEFT LEG DEBRIDEMENT BIOPSY POSS APPLICATION WOUND VAC performed by Juany Snow DPM at [place] [address] N/A 2020    LAPAROSCOPIC INSERTION PERITONEAL DIALYSIS CATHETER performed by Annia Hylton MD at [place] [address] ESOPHAGOGASTRODUODENOSCOPY TRANSORAL DIAGNOSTIC N/A 2018    EGD ESOPHAGOGASTRODUODENOSCOPY performed by Cherry Craig MD at [address] [place] [address] TRANSESOPHAGEAL ECHOCARDIOGRAM N/A 10/19/2020    TRANSESOPHAGEAL ECHOCARDIOGRAM WITH BUBBLE STUDY performed by Cassius Velazquez MD at [address] [facility] [address] [facility]  2018    UPPER GASTROINTESTINAL ENDOSCOPY  2018    EGD BIOPSY performed by Diego Wray MD at [address] [facility] N/A 10/11/2019    EGD ESOPHAGOGASTRODUODENOSCOPY performed by Humaira Santos DO at [address] [facility] N/A 2021    EGD BIOPSY performed by Nelson Edouard MD at [address] [place] [address] History   Problem Relation Age of Onset    Asthma Mother     Hypertension Mother     High Blood Pressure Mother    Ana Bennettl Diabetes Mother     Asthma Brother     High Blood Pressure Father        Allergies   Allergen Reactions    Cefepime Other (See Comments)     \" neurological side effect- slurred speech and confusion    Toradol [Ketorolac Tromethamine] Anaphylaxis and Hives     Exam: 9/10/21  NEUROLOGICAL EXAMINATION:  Protective sensation absent to bilateral  lower extremities. Epicritic sensation is otherwise intact. VASCULAR EXAMINATION:  DP and PT pulses are +2. Capillary refill is  brisk at the toes. kin temperature is warm, proximal to distal.  DERMATOLOGICAL EXAMINATION:  Hyperpigmentation noted to the left  anterior shin at the site of previous ulceration and also to the second  lesser toes with associated erythema. MUSCULOSKELETAL EXAMINATION:  Pain to palpation to the base of toes 2,  3, 4, and 5. Range of motion of the lesser MTP joints is limited with  pain.     CBC with Differential:    Lab Results   Component Value Date    WBC 5.5 09/11/2021    RBC 3.05 09/11/2021    HGB 9.3 09/11/2021    HCT 29.0 09/11/2021     09/11/2021    MCV 95.1 09/11/2021    MCH 30.5 09/11/2021    MCHC 32.1 09/11/2021    RDW 14.6 09/11/2021    NRBC 0.9 08/10/2020    SEGSPCT 67 06/27/2013    METASPCT 1.7 08/10/2020    LYMPHOPCT 33.3 09/11/2021    MONOPCT 4.9 09/11/2021    BASOPCT 1.6 09/11/2021    MONOSABS 0.27 09/11/2021    LYMPHSABS 1.82 09/11/2021    EOSABS 0.49 09/11/2021    BASOSABS 0.09 09/11/2021     Hemoglobin/Hematocrit:    Lab Results   Component Value Date    HGB 9.3 09/11/2021    HCT 29.0 09/11/2021       Scheduled Meds:   sodium zirconium cyclosilicate  10 g Oral Daily    sodium chloride flush  5-40 mL IntraVENous 2 times per day    heparin (porcine)  5,000 Units SubCUTAneous 3 times per day    pantoprazole  40 mg Oral QAM AC    folic acid  1 mg Oral Daily    levothyroxine  75 mcg Oral Daily    metoprolol tartrate  25 mg Oral BID    sevelamer  800 mg Oral TID WC    mirtazapine  15 mg Oral Nightly    ARIPiprazole  5 mg Oral Nightly    insulin glargine  10 Units SubCUTAneous BID    insulin lispro  0-6 Units SubCUTAneous TID WC    insulin lispro  0-3 Units SubCUTAneous Nightly    albuterol  10 mg Nebulization Once     Continuous Infusions:   dextrose 5% and 0.45% NaCl with KCl 20 mEq 150 mL/hr at 09/09/21 0611    sodium chloride       PRN Meds:. loperamide, oxyCODONE-acetaminophen, dextrose 5% and 0.45% NaCl with KCl 20 mEq, dextrose, sodium chloride flush, sodium chloride, ondansetron **OR** ondansetron, polyethylene glycol, acetaminophen **OR** acetaminophen, hydrALAZINE, dextrose    Diagnostics      Assessment  1. Non-displaced proximal phalanx fracture of toes 2, 3, 4, and 5  2. Type 1 diabetes mellitus with neuropathy. 3.  End-stage renal disease, on peritoneal dialysis. Plan  - Patient was examined and evaluated. - Reviewed patient's recent lab results and pertinent diagnostic imaging.  - Reviewed ancillary service notes. - Patient to continue wearing surgical on left foot  - Patient is clear from podiatry, can D/c when medically stable  - Will continue to follow patient while they are in-house.     D/w: Mariah Hatch DPM FACFAS  Fellowship-Trained Foot and Ankle Surgeon  Diplomate, [address] [place] [address] Board of Foot and Ankle Surgeons  [phone]    [place] DPM PGY-3  Cell: [phone]

## 2021-09-11 NOTE — PROGRESS NOTES
Department of Internal Medicine  Nephrology Progress  Note      SUBJECTIVE:  We are following Ms. Wilton Flores for ESRD on PD and hyperkalemia. PHYSICAL EXAM:      Vitals:    VITALS:  BP (!) 157/97   Pulse 100   Temp 97.9 °F (36.6 °C) (Oral)   Resp 18   Ht 5' 4\" (1.626 m)   Wt 173 lb (78.5 kg)   SpO2 98%   BMI 29.70 kg/m²   24HR INTAKE/OUTPUT:      Intake/Output Summary (Last 24 hours) at 9/11/2021 1047  Last data filed at 9/11/2021 0645  Gross per 24 hour   Intake    Output 1725 ml   Net -1725 ml       PD Catheter Exam:  PD catheter exit site clean    Constitutional: Awake in no acute distress  HEENT:  Normocephalic, PERRL  Respiratory: Decreased breath sounds on the basis  Cardiovascular/Edema:  RRR, S1/S2  Gastrointestinal:  Soft, PD catheter exit site clean   Neurologic:  Nonfocal, AVELAR  Skin:  Warm, dry, no lesions  Other:  left foot cyanotic  Scheduled Meds:   sodium zirconium cyclosilicate  10 g Oral Daily    sodium chloride flush  5-40 mL IntraVENous 2 times per day    heparin (porcine)  5,000 Units SubCUTAneous 3 times per day    pantoprazole  40 mg Oral QAM AC    folic acid  1 mg Oral Daily    levothyroxine  75 mcg Oral Daily    metoprolol tartrate  25 mg Oral BID    sevelamer  800 mg Oral TID WC    mirtazapine  15 mg Oral Nightly    ARIPiprazole  5 mg Oral Nightly    insulin glargine  10 Units SubCUTAneous BID    insulin lispro  0-6 Units SubCUTAneous TID WC    insulin lispro  0-3 Units SubCUTAneous Nightly    albuterol  10 mg Nebulization Once     Continuous Infusions:   dextrose 5% and 0.45% NaCl with KCl 20 mEq 150 mL/hr at 09/09/21 0611    sodium chloride       PRN Meds:. loperamide, oxyCODONE-acetaminophen, dextrose 5% and 0.45% NaCl with KCl 20 mEq, dextrose, sodium chloride flush, sodium chloride, ondansetron **OR** ondansetron, polyethylene glycol, acetaminophen **OR** acetaminophen, hydrALAZINE, dextrose    DATA:    CBC:   Lab Results   Component Value Date    WBC 5.5 09/11/2021    RBC 3.05 09/11/2021    HGB 9.3 09/11/2021    HCT 29.0 09/11/2021    MCV 95.1 09/11/2021    MCH 30.5 09/11/2021    MCHC 32.1 09/11/2021    RDW 14.6 09/11/2021     09/11/2021    MPV 11.2 09/11/2021     CMP:    Lab Results   Component Value Date     09/11/2021    K 6.0 09/11/2021    K 4.0 08/26/2021    CL 98 09/11/2021    CO2 25 09/11/2021    BUN 28 09/11/2021    CREATININE 5.7 09/11/2021    GFRAA 11 09/11/2021    LABGLOM 11 09/11/2021    GLUCOSE 234 09/11/2021    GLUCOSE 130 05/18/2012    PROT 5.7 09/07/2021    LABALBU 2.5 09/07/2021    LABALBU 4.1 05/18/2012    CALCIUM 7.4 09/11/2021    BILITOT <0.2 09/07/2021    ALKPHOS 251 09/07/2021    AST 80 09/07/2021    ALT 41 09/07/2021     Magnesium:    Lab Results   Component Value Date    MG 1.7 09/11/2021     Phosphorus:    Lab Results   Component Value Date    PHOS 5.4 09/11/2021     Radiology Review:         CXR 9/08/2021   No acute cardiopulmonary process.                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 Upon arrival to ER her blood glucose was found to be 1087 and her potassium 7. Patient was started on insulin infusion and emergent PD initiated in ER. We are consulted for hyperkalemia and ESRD on PD. IMPRESSION/RECOMMENDATIONS:     1. ESRD on PD, to continue CCPD. Total  mls. Effluent drainage clear pale yellow today. To increase prescription to 12 L total per day, 5 exchanges of 2 L of 1.5% at night with a long dwell of 2 L of 2.5%. 2. Hyperkalemia, emergent PD initiated, to continue Lokelma 10 g daily  3.  DKA, initial blood glucose 1087, improved and no longer on insulin drip  4. HTN, on carvedilol  5. MBD of CKD, on sevelamer at home  6. Anemia of CKD, on epoetin alpha at home  -------------------------------------------------------------------  5. History of gastroparesis, on metoclopramide  6. PVD? left foot ischemia, no plans for intervention from podiatry. X-ray demonstrates broken toes left foot 2-5.  7. Abdominal pain, PD cell count 13, neutrophil count 23. Plan:    · CCPD, 5 exchanges of 2 L of 1.5% at night with a long dwell of 2 L of 2.5%. Total volume 2 liters.    · Continue to monitor potassium level    · Give lokelma 10 g X3 today then daily  · Low potassium diet  · Repeat BMP 4 pm    Discussed with primary team.    Electronically signed by HEBER Mark CNP on 9/11/2021 at 10:47 AM

## 2021-09-12 LAB
ANION GAP SERPL CALCULATED.3IONS-SCNC: 6 MMOL/L (ref 7–16)
ANION GAP SERPL CALCULATED.3IONS-SCNC: 8 MMOL/L (ref 7–16)
BASOPHILS ABSOLUTE: 0.06 E9/L (ref 0–0.2)
BASOPHILS RELATIVE PERCENT: 1.2 % (ref 0–2)
BUN BLDV-MCNC: 25 MG/DL (ref 6–20)
BUN BLDV-MCNC: 26 MG/DL (ref 6–20)
CALCIUM SERPL-MCNC: 7.1 MG/DL (ref 8.6–10.2)
CALCIUM SERPL-MCNC: 7.2 MG/DL (ref 8.6–10.2)
CHLORIDE BLD-SCNC: 104 MMOL/L (ref 98–107)
CHLORIDE BLD-SCNC: 104 MMOL/L (ref 98–107)
CO2: 30 MMOL/L (ref 22–29)
CO2: 30 MMOL/L (ref 22–29)
CREAT SERPL-MCNC: 4.4 MG/DL (ref 0.5–1)
CREAT SERPL-MCNC: 4.6 MG/DL (ref 0.5–1)
EOSINOPHILS ABSOLUTE: 0.41 E9/L (ref 0.05–0.5)
EOSINOPHILS RELATIVE PERCENT: 7.9 % (ref 0–6)
GFR AFRICAN AMERICAN: 14
GFR AFRICAN AMERICAN: 14
GFR NON-AFRICAN AMERICAN: 14 ML/MIN/1.73
GFR NON-AFRICAN AMERICAN: 14 ML/MIN/1.73
GLUCOSE BLD-MCNC: 123 MG/DL (ref 74–99)
GLUCOSE BLD-MCNC: 97 MG/DL (ref 74–99)
HCT VFR BLD CALC: 26.4 % (ref 34–48)
HEMOGLOBIN: 8.5 G/DL (ref 11.5–15.5)
IMMATURE GRANULOCYTES #: 0.01 E9/L
IMMATURE GRANULOCYTES %: 0.2 % (ref 0–5)
LYMPHOCYTES ABSOLUTE: 1.83 E9/L (ref 1.5–4)
LYMPHOCYTES RELATIVE PERCENT: 35.4 % (ref 20–42)
MAGNESIUM: 1.6 MG/DL (ref 1.6–2.6)
MAGNESIUM: 1.6 MG/DL (ref 1.6–2.6)
MAGNESIUM: 1.7 MG/DL (ref 1.6–2.6)
MAGNESIUM: 1.8 MG/DL (ref 1.6–2.6)
MCH RBC QN AUTO: 30.9 PG (ref 26–35)
MCHC RBC AUTO-ENTMCNC: 32.2 % (ref 32–34.5)
MCV RBC AUTO: 96 FL (ref 80–99.9)
METER GLUCOSE: 126 MG/DL (ref 74–99)
METER GLUCOSE: 132 MG/DL (ref 74–99)
METER GLUCOSE: 79 MG/DL (ref 74–99)
METER GLUCOSE: 88 MG/DL (ref 74–99)
MONOCYTES ABSOLUTE: 0.32 E9/L (ref 0.1–0.95)
MONOCYTES RELATIVE PERCENT: 6.2 % (ref 2–12)
NEUTROPHILS ABSOLUTE: 2.54 E9/L (ref 1.8–7.3)
NEUTROPHILS RELATIVE PERCENT: 49.1 % (ref 43–80)
PDW BLD-RTO: 14.9 FL (ref 11.5–15)
PHOSPHORUS: 3.8 MG/DL (ref 2.5–4.5)
PHOSPHORUS: 4.9 MG/DL (ref 2.5–4.5)
PHOSPHORUS: 4.9 MG/DL (ref 2.5–4.5)
PHOSPHORUS: 5 MG/DL (ref 2.5–4.5)
PLATELET # BLD: 192 E9/L (ref 130–450)
PMV BLD AUTO: 11.5 FL (ref 7–12)
POTASSIUM SERPL-SCNC: 4.2 MMOL/L (ref 3.5–5)
POTASSIUM SERPL-SCNC: 4.3 MMOL/L (ref 3.5–5)
POTASSIUM SERPL-SCNC: 4.4 MMOL/L (ref 3.5–5)
RBC # BLD: 2.75 E12/L (ref 3.5–5.5)
SODIUM BLD-SCNC: 140 MMOL/L (ref 132–146)
SODIUM BLD-SCNC: 142 MMOL/L (ref 132–146)
WBC # BLD: 5.2 E9/L (ref 4.5–11.5)

## 2021-09-12 PROCEDURE — 2580000003 HC RX 258: Performed by: INTERNAL MEDICINE

## 2021-09-12 PROCEDURE — 84132 ASSAY OF SERUM POTASSIUM: CPT

## 2021-09-12 PROCEDURE — 36415 COLL VENOUS BLD VENIPUNCTURE: CPT

## 2021-09-12 PROCEDURE — 82962 GLUCOSE BLOOD TEST: CPT

## 2021-09-12 PROCEDURE — 84100 ASSAY OF PHOSPHORUS: CPT

## 2021-09-12 PROCEDURE — 6370000000 HC RX 637 (ALT 250 FOR IP): Performed by: NURSE PRACTITIONER

## 2021-09-12 PROCEDURE — 85025 COMPLETE CBC W/AUTO DIFF WBC: CPT

## 2021-09-12 PROCEDURE — 99233 SBSQ HOSP IP/OBS HIGH 50: CPT | Performed by: INTERNAL MEDICINE

## 2021-09-12 PROCEDURE — 6370000000 HC RX 637 (ALT 250 FOR IP): Performed by: INTERNAL MEDICINE

## 2021-09-12 PROCEDURE — 80048 BASIC METABOLIC PNL TOTAL CA: CPT

## 2021-09-12 PROCEDURE — 83735 ASSAY OF MAGNESIUM: CPT

## 2021-09-12 PROCEDURE — 90945 DIALYSIS ONE EVALUATION: CPT

## 2021-09-12 PROCEDURE — 2580000003 HC RX 258

## 2021-09-12 PROCEDURE — 6370000000 HC RX 637 (ALT 250 FOR IP)

## 2021-09-12 PROCEDURE — 6360000002 HC RX W HCPCS

## 2021-09-12 PROCEDURE — 1200000000 HC SEMI PRIVATE

## 2021-09-12 PROCEDURE — 36592 COLLECT BLOOD FROM PICC: CPT

## 2021-09-12 RX ORDER — GENTAMICIN SULFATE 1 MG/G
CREAM TOPICAL DAILY
Status: DISCONTINUED | OUTPATIENT
Start: 2021-09-12 | End: 2021-09-15 | Stop reason: HOSPADM

## 2021-09-12 RX ADMIN — PANTOPRAZOLE SODIUM 40 MG: 40 TABLET, DELAYED RELEASE ORAL at 06:44

## 2021-09-12 RX ADMIN — HEPARIN SODIUM 5000 UNITS: 5000 INJECTION INTRAVENOUS; SUBCUTANEOUS at 14:47

## 2021-09-12 RX ADMIN — OXYCODONE AND ACETAMINOPHEN 1.5 TABLET: 5; 325 TABLET ORAL at 21:14

## 2021-09-12 RX ADMIN — LEVOTHYROXINE SODIUM 75 MCG: 75 TABLET ORAL at 06:44

## 2021-09-12 RX ADMIN — SODIUM ZIRCONIUM CYCLOSILICATE 10 G: 10 POWDER, FOR SUSPENSION ORAL at 02:54

## 2021-09-12 RX ADMIN — SODIUM ZIRCONIUM CYCLOSILICATE 10 G: 10 POWDER, FOR SUSPENSION ORAL at 09:22

## 2021-09-12 RX ADMIN — Medication 10 ML: at 09:24

## 2021-09-12 RX ADMIN — FOLIC ACID 1 MG: 1 TABLET ORAL at 09:22

## 2021-09-12 RX ADMIN — MIRTAZAPINE 15 MG: 15 TABLET, FILM COATED ORAL at 21:14

## 2021-09-12 RX ADMIN — SEVELAMER CARBONATE 800 MG: 800 TABLET, FILM COATED ORAL at 09:21

## 2021-09-12 RX ADMIN — METOPROLOL TARTRATE 25 MG: 25 TABLET, FILM COATED ORAL at 21:14

## 2021-09-12 RX ADMIN — ARIPIPRAZOLE 5 MG: 5 TABLET ORAL at 21:14

## 2021-09-12 RX ADMIN — SEVELAMER CARBONATE 800 MG: 800 TABLET, FILM COATED ORAL at 11:42

## 2021-09-12 RX ADMIN — INSULIN GLARGINE 10 UNITS: 100 INJECTION, SOLUTION SUBCUTANEOUS at 09:23

## 2021-09-12 RX ADMIN — DEXTROSE AND SODIUM CHLORIDE: 5; 900 INJECTION, SOLUTION INTRAVENOUS at 14:47

## 2021-09-12 RX ADMIN — OXYCODONE AND ACETAMINOPHEN 1.5 TABLET: 5; 325 TABLET ORAL at 10:17

## 2021-09-12 RX ADMIN — HEPARIN SODIUM 5000 UNITS: 5000 INJECTION INTRAVENOUS; SUBCUTANEOUS at 21:14

## 2021-09-12 RX ADMIN — HEPARIN SODIUM 5000 UNITS: 5000 INJECTION INTRAVENOUS; SUBCUTANEOUS at 06:45

## 2021-09-12 RX ADMIN — SEVELAMER CARBONATE 800 MG: 800 TABLET, FILM COATED ORAL at 16:36

## 2021-09-12 RX ADMIN — Medication 10 ML: at 21:28

## 2021-09-12 RX ADMIN — METOPROLOL TARTRATE 25 MG: 25 TABLET, FILM COATED ORAL at 09:22

## 2021-09-12 ASSESSMENT — PAIN SCALES - GENERAL
PAINLEVEL_OUTOF10: 8
PAINLEVEL_OUTOF10: 8

## 2021-09-12 NOTE — CONSULTS
1 Mert Hendrix MD    Patient's Name/Date of Birth: Jp Jiménez / 1990    Date: September 11, 2021     Consulting Surgeon: Negrito Vines MD    PCP: Ermelinda Simmonds, MD     Chief Complaint: hyperkalemia, ESRD    HPI:   Jp Jiménez is a 36 y.o. female who presents for evaluation of ESRD. Peritoneal dialysis and other treatments have not corrected her hyperkalemia which has worsened to 7.1. I was consulted for hemodialysis catheter placement for initiation of HD.        Patient Active Problem List   Diagnosis    Non compliance w medication regimen    Tobacco smoking complicating pregnancy    MTHFR mutation    Diabetic ketoacidosis without coma associated with type 1 diabetes mellitus (HCC)    Hypertension    Prolonged QT interval    Iron deficiency anemia    Sinus tachycardia    Bladder dysfunction    Hypokalemia    Elevated troponin    Severe protein-calorie malnutrition (HCC)    Uncontrolled type 1 diabetes mellitus with hyperglycemia, with long-term current use of insulin (HCC)    End stage renal disease (HCC)    Hypothyroidism    Hyperlipidemia    Acute cystitis    Nondisplaced fracture of neck of fifth metacarpal bone, left hand, initial encounter for closed fracture    Chronic right-sided low back pain    Endocarditis    Seizure (HCC)    Hyperkalemia    Hypomagnesemia    Hypocalcemia    Intractable nausea and vomiting    Wound of left leg, sequela    Syncope    Type 1 diabetes mellitus without complication (HCC)    Hyperosmolality    Major depressive disorder    Lactic acid acidosis    Early satiety    Acute on chronic systolic CHF (congestive heart failure) ([facility])    Atypical chest pain       Allergies   Allergen Reactions    Cefepime Other (See Comments)     \" neurological side effect- slurred speech and confusion    Toradol [Ketorolac Tromethamine] Anaphylaxis and Hives       Past Medical History:   Diagnosis Date    Acute congestive heart failure ([address]     BC (acute kidney injury) ([address] 10/01/2019    Cardiac arrest ([address] 02/15/2021    Cephalgia 10/09/2019    Chronic kidney disease     Depression     Diabetes mellitus ([address]     Diabetic gastroparesis associated with type 1 diabetes mellitus ([address] 2018    Diabetic ketoacidosis ([address] 2011    Diabetic ketoacidosis with coma associated with type 1 diabetes mellitus ([address] 2013    Diabetic polyneuropathy associated with type 1 diabetes mellitus ([address] 2020    Drug use complicating pregnancy in third trimester     Endocarditis 10/31/2020    ESRD (end stage renal disease) ([address] 2020    H/O cardiovascular stress test 2021    Lexiscan    Hemodialysis patient [facility])     History of blood transfusion 2019    Hyperlipidemia 10/08/2020    Hyperosmolar hyperglycemic state (HHS) ([address] 2020    Hypothyroidism 10/08/2020    Iron deficiency anemia 10/01/2019    MDRO (multiple drug resistant organisms) resistance     MRSA (methicillin resistant Staphylococcus aureus)     back wound abcess    Non compliance w medication regimen 2016    Other disorders of kidney and ureter     Pregnancy 2016    16 weeks    Previous  delivery affecting pregnancy, antepartum 2017    Previous stillbirth or demise, antepartum 2016    Seizure ([address] 2020    Severe pre-eclampsia in third trimester 2016    Shock liver 02/15/2021    Valvular endocarditis 11/10/2020    This Diagnosis was added to the Problem List based on transcribed orders from Dr. Annamaria Osei          Past Surgical History:   Procedure Laterality Date    BACK SURGERY      abscess     SECTION      x2    CHOLECYSTECTOMY, LAPAROSCOPIC N/A 2019    CHOLECYSTECTOMY LAPAROSCOPIC performed by Sunshine Barber MD at [facility] N/A 2018    COLONOSCOPY WITH BIOPSY performed by James Garza MD at [place] [address]  COLONOSCOPY N/A 2018    COLONOSCOPY WITH BIOPSY performed by Krishna Ferraro MD at [address]  2012    EF 57%    ECHO COMPL W DOP COLOR FLOW  6/10/2013         EMBOLECTOMY N/A 2020    [address] [place] [address], [id number] [place] [address], YULISSA -- REQS ROOM 3 performed by Bowen Aguirre MD at [address] [place] [address] Left 2021    LEFT LEG INCISION AND DRAINAGE, DEBRIDEMENT, WOUND VAC APPLICATION performed by Fidelina Avila DPM at [address] [place] [address] Left 2021    LEFT LEG DEBRIDEMENT BIOPSY POSS APPLICATION WOUND VAC performed by Fidelina Avila DPM at [place] [address] N/A 2020    LAPAROSCOPIC INSERTION PERITONEAL DIALYSIS CATHETER performed by Lubna Hatch MD at [place] [address] ESOPHAGOGASTRODUODENOSCOPY TRANSORAL DIAGNOSTIC N/A 2018    EGD ESOPHAGOGASTRODUODENOSCOPY performed by Shelley Childs MD at [address] [place] [address] TRANSESOPHAGEAL ECHOCARDIOGRAM N/A 10/19/2020    TRANSESOPHAGEAL ECHOCARDIOGRAM WITH BUBBLE STUDY performed by Jovita De La Vega MD at [address] [facility] [address] [facility]  2018    UPPER GASTROINTESTINAL ENDOSCOPY  2018    EGD BIOPSY performed by Krishna Ferraro MD at [address] [place] [address] N/A 10/11/2019    EGD ESOPHAGOGASTRODUODENOSCOPY performed by Emy Herbert DO at [address] [place] [address] N/A 2021    EGD BIOPSY performed by Trina Paez MD at [facility]     Socioeconomic History    Marital status: Single     Spouse name: Not on file    Number of children: 2    Years of education: Not on file    Highest education level: Not on file   Occupational History    Not on file   Tobacco Use    Smoking status: Former Smoker     Packs/day: 0.50     Years: 6.00     Pack years: 3.00     Types: Cigarettes     Quit date: 2021     Years since quittin.6    Smokeless tobacco: Never Used    Tobacco comment: vaper cig   Vaping Use    Vaping Use: Never used   Substance and Sexual Activity    Alcohol use: No    Drug use: No     Comment: documented prior history of opioid abuse    Sexual activity: Yes     Partners: Male   Other Topics Concern    Not on file   Social History Narrative    Not on file     Social Determinants of Health     Financial Resource Strain:     Difficulty of Paying Living Expenses:    Food Insecurity:     Worried About Running Out of Food in the Last Year:     [address] in the Last Year:    Transportation Needs:     Lack of Transportation (Medical):  Lack of Transportation (Non-Medical):    Physical Activity:     Days of Exercise per Week:     Minutes of Exercise per Session:    Stress:     Feeling of Stress :    Social Connections:     Frequency of Communication with Friends and Family:     Frequency of Social Gatherings with Friends and Family:     Attends [employer] Services:     Active Member of Clubs or Organizations:     Attends Club or Organization Meetings:     Marital Status:    Intimate Partner Violence:     Fear of Current or Ex-Partner:     Emotionally Abused:     Physically Abused:     Sexually Abused: The patient has a family history that is negative for severe cardiovascular or respiratory issues, negative for reaction to anesthesia. Recent Labs     09/10/21  0620 09/11/21  0615   WBC 6.8 5.5   HGB 10.0* 9.3*   HCT 30.3* 29.0*   MCV 95.3 95.1    189       Recent Labs     09/11/21  0415 09/11/21  0415 09/11/21  0805 09/11/21  1650 09/11/21  1653 09/11/21  1840     --  131* 139  --   --    K 6.2*   < > 6.0* 6.9*  --  7.1*   CO2 24  --  25 27  --   --    PHOS 5.5*  --  5.4*  --  5.8*  --    BUN 28*  --  28* 33*  --   --    CREATININE 6.0*  --  5.7* 6.2*  --   --     < > = values in this interval not displayed. No results for input(s): PROT, INR, LIPASE, AMYLASE in the last 72 hours.       Intake/Output Summary (Last 24 hours) at 9/11/2021 2154  Last data filed at 9/11/2021 2543  Gross per 24 hour   Intake    Output 1725 ml   Net -1725 ml       I have reviewed relevant labs from this admission and interpretation is included in my assessment and plan      Review of Systems  A complete 10 system review was performed and are otherwise negative unless mentioned in the above HPI. Specific negatives are listed below but may not include all those reviewed. General ROS: negative obtundation, AMS  ENT ROS: negative rhinorrhea, epistaxis  Allergy and Immunology ROS: negative itchy/watery eyes or nasal congestion  Hematological and Lymphatic ROS: negative spontaneous bleeding or bruising  Endocrine ROS: negative  lethargy, mood swings, palpitations or polydipsia/polyuria  Respiratory ROS: negative sputum changes, stridor, tachypnea or wheezing  Cardiovascular ROS: negative for - loss of consciousness, murmur or orthopnea  Gastrointestinal ROS: negative for - hematochezia or hematemesis  Genito-Urinary ROS: negative for -  genital discharge or hematuria  Musculoskeletal ROS: negative for - focal weakness, gangrene  Psych/Neuro ROS: negative for - visual or auditory hallucinations, suicidal ideation      Physical exam:   BP (!) 157/97   Pulse 90   Temp 97.9 °F (36.6 °C) (Oral)   Resp 18   Ht 5' 4\" (1.626 m)   Wt 173 lb (78.5 kg)   SpO2 98%   BMI 29.70 kg/m²   General appearance:  NAD, appears stated age  Head: NCAT, PERRLA, EOMI, red conjunctiva  Neck: supple, no masses, trachea midline  Lungs: Equal chest rise bilateral, no retractions, no wheezing  Heart: Reg rate  Abdomen: soft, NT, ND  Skin; warm and dry, no cyanosis  Gu: no cva tenderness  Extremities: atraumatic, no focal motor deficits.  R groin TLC in place  Psych: No tremor, visual hallucinations    Pathology: n/a    Radiology: n/a    Assessment:  Ghada Floyd is a 36 y.o. female with hyperkalemia, poor venous access    Patient Active Problem List   Diagnosis  Non compliance w medication regimen    Tobacco smoking complicating pregnancy    MTHFR mutation    Diabetic ketoacidosis without coma associated with type 1 diabetes mellitus (HCC)    Hypertension    Prolonged QT interval    Iron deficiency anemia    Sinus tachycardia    Bladder dysfunction    Hypokalemia    Elevated troponin    Severe protein-calorie malnutrition (HCC)    Uncontrolled type 1 diabetes mellitus with hyperglycemia, with long-term current use of insulin (HCC)    End stage renal disease (HCC)    Hypothyroidism    Hyperlipidemia    Acute cystitis    Nondisplaced fracture of neck of fifth metacarpal bone, left hand, initial encounter for closed fracture    Chronic right-sided low back pain    Endocarditis    Seizure (HCC)    Hyperkalemia    Hypomagnesemia    Hypocalcemia    Intractable nausea and vomiting    Wound of left leg, sequela    Syncope    Type 1 diabetes mellitus without complication (HCC)    Hyperosmolality    Major depressive disorder    Lactic acid acidosis    Early satiety    Acute on chronic systolic CHF (congestive heart failure) ([facility])    Atypical chest pain       Plan:  Bedside placement of temp hemodialysis catheter  Time spent reviewing past medical, surgical, social and family history, vitals, nursing assessment and images. Time spent face to face with patient and family counciling and discussing care exceeded 50% of the time of the consult. Additional time spent reviewing images and labs, discussing case with nursing, support staff and other physicians; as well as coordinating care.         Physician Signature: Electronically signed by Dr. Lucien Norwood

## 2021-09-12 NOTE — OP NOTE
Patient Name: Shaniqua Hoffman [facility]. Record Number: [id number]  Date: 9/11/2021   Time: 9:57 PM   Room/Bed: [facility]0/0420-01  Central Line Placement Procedure Note  Indication: vascular access    Consent: The patient provided verbal consent for this procedure. Procedure: The patient was positioned appropriately and the skin over the left femoral vein was prepped with Chloraprep. Local anesthesia was obtained by infiltration using 1% Lidocaine without epinephrine. A large bore needle was used to identify the vein. A guide wire was then inserted into the vein through the needle. A triple lumen catheter 12FR hemodialysis 16cm was then inserted into the vessel over the guide wire using the Seldinger technique. All ports showed good, free flowing blood return and were flushed with saline solution. The catheter was then securely fastened to the skin with sutures and covered with a sterile dressing. A post procedure X-ray was not indicated. The patient tolerated the procedure well.     Complications: None    Marshal Lantigua MD

## 2021-09-12 NOTE — PROGRESS NOTES
[address] [place], [place] [address]ist   Progress Note    Admitting Date and Time: 9/7/2021 10:04 PM  Admit Dx: Hyperkalemia [E87.5]  Hyperglycemia [R73.9]  Uncontrolled type 1 diabetes mellitus with hyperglycemia ([address] [E10.65]    Subjective:    9/8: Pt stating having a lot of pain in abdomen. 9/9: Patient states he is having some abdominal cramping thinks she may be starting her period. She was asking for pain medication we discussed transitioning her to oral meds after 1 more dose of Dilaudid    9/10: Nephrology was in earlier today and discoloration of patient's toes was brought to his attention. He was concerned that this was a vascular issue. However, on my examination this appears to be may be early cellulitis as there is quite a bit of swelling in the foot and toes. X-ray ordered and podiatry consult. 9/11: Patient sitting up in bed she is tearful that she is not going home. Nephrology nurse practitioner at bedside. 9/12: Patient resting comfortably. Dr. Sanford Cuadra of nephrology in the room discussed transitioning patient to hemodialysis instead of peritoneal dialysis as her potassium has been difficult to control.     Per RN: Patient had hemodialysis overnight and follow potassium came back at 4.3     sodium zirconium cyclosilicate  10 g Oral Daily    sodium chloride flush  5-40 mL IntraVENous 2 times per day    heparin (porcine)  5,000 Units SubCUTAneous 3 times per day    pantoprazole  40 mg Oral QAM AC    folic acid  1 mg Oral Daily    levothyroxine  75 mcg Oral Daily    metoprolol tartrate  25 mg Oral BID    sevelamer  800 mg Oral TID WC    mirtazapine  15 mg Oral Nightly    ARIPiprazole  5 mg Oral Nightly    insulin glargine  10 Units SubCUTAneous BID    insulin lispro  0-6 Units SubCUTAneous TID WC    insulin lispro  0-3 Units SubCUTAneous Nightly    albuterol  10 mg Nebulization Once     oxyCODONE-acetaminophen, 1.5 tablet, Q4H PRN  loperamide, 4 mg, TID PRN  dextrose, 12.5 g, PRN  sodium chloride flush, 10 mL, PRN  sodium chloride, 25 mL, PRN  ondansetron, 4 mg, Q8H PRN   Or  ondansetron, 4 mg, Q6H PRN  polyethylene glycol, 17 g, Daily PRN  acetaminophen, 650 mg, Q6H PRN   Or  acetaminophen, 650 mg, Q6H PRN  hydrALAZINE, 10 mg, Q6H PRN  dextrose, 12.5 g, PRN         Objective:    BP (!) 144/81   Pulse 103   Temp 97.7 °F (36.5 °C) (Oral)   Resp 16   Ht 5' 4\" (1.626 m)   Wt 169 lb 12.1 oz (77 kg)   SpO2 99%   BMI 29.14 kg/m²   General Appearance: alert and in no acute distress  Skin: warm and dry  Head: normocephalic and atraumatic  Eyes: pupils equal, round, and reactive to light, extraocular eye movements intact, conjunctivae normal  Neck: neck supple and non tender without mass   Pulmonary/Chest: clear to auscultation bilaterally- no wheezes, rales or rhonchi, normal air movement, no respiratory distress  Cardiovascular: normal rate, normal S1 and S2 and no carotid bruits  Abdomen: soft, mild diffuse, non-distended, normal bowel sounds, no masses or organomegaly  Extremities: left foot in surgical shoe. Neurologic: no cranial nerve deficit and speech normal      Recent Labs     09/11/21  0805 09/11/21  0805 09/11/21  1650 09/11/21  1650 09/11/21  1840 09/12/21  0310 09/12/21  0804   *  --  139  --   --   --  140   K 6.0*   < > 6.9*   < > 7.1* 4.3 4.4   CL 98  --  106  --   --   --  104   CO2 25  --  27  --   --   --  30*   BUN 28*  --  33*  --   --   --  25*   CREATININE 5.7*  --  6.2*  --   --   --  4.4*   GLUCOSE 234*  --  121*  --   --   --  123*   CALCIUM 7.4*  --  7.5*  --   --   --  7.1*    < > = values in this interval not displayed. No results for input(s): ALKPHOS, PROT, LABALBU, BILITOT, AST, ALT in the last 72 hours.     Recent Labs     09/10/21  0620 09/11/21  0615 09/12/21  0804   WBC 6.8 5.5 5.2   RBC 3.18* 3.05* 2.75*   HGB 10.0* 9.3* 8.5*   HCT 30.3* 29.0* 26.4*   MCV 95.3 95.1 96.0   MCH 31.4 30.5 30.9   MCHC 33.0 32.1 32.2   RDW 14.4 14.6 14.9    189 192   MPV 11.0 11.2 11.5       Body Fluid Cell Count with Differential [[id number]] Collected: 09/10/21 1450     Specimen: Peritoneal Dialysis Fluid Updated: 09/10/21 1516      Cell Count Fluid Type Perit. Dialysis      Color, Fluid Colorless      Appearance, Fluid Clear      Nucl Cell, Fluid 13 /uL       RBC, Fluid <2,000 /uL       Neutrophil Count, Fluid 23 %       Monocyte Count, Fluid 77 %          COVID-19, Rapid [[id number]] Collected: 09/07/21 2325     Specimen: Nasopharyngeal Swab Updated: 09/08/21 0008      SARS-CoV-2, NAAT Not Detected       Radiology:   XR FOOT LEFT (MIN 3 VIEWS)   Final Result   Fractures proximal portions of the proximal phalanx of the 2nd through 5th   digits of unknown acuity. XR CHEST PORTABLE   Final Result   No acute cardiopulmonary process. Assessment:  Active Problems:    Uncontrolled type 1 diabetes mellitus with hyperglycemia, with long-term current use of insulin (HCC)    End stage renal disease (HCC)    Hyperkalemia  Resolved Problems:    * No resolved hospital problems. *      Plan:    1. Type I DM with DKA vs HHS  - patient was on insulin drip overnight. , place on Lantus 10 units bid (new increased outpatient dose) and SSI. Decreased back to 10 units nightly yesterday because persistent hypoglycemia.  -blood sugar low this morning so Lantus held.   -patient not using insulin pump as sensor was lost when she came in as cardiac arrest back in Feb 2021. RN called endocrine office and paperwork in place for new sensor but too soon to get insurance to cover as will be cash and runs appx $160.    2. ESRD on PD with abdominal pain  - will check cell count with effluent to see if there is evidence of peritonitis. Hold off on antibiotics for now. This was not sent Wed and Thurs but did get sent Friday and shows no evidence of peritonitis.   -Dilaudid pain panel intially but stopped after dose 9/9  morning changed to Percocet 5/325 mg q6 prn for pain.   -Emergent HD treatment Saturday night. Currently on continuous peritoneal dialysis for today    3. Hyperkalemia  - addressed with PD with K improving from 7.0-->5.1. Remains elevated above 6 and given Lokelma. Nephrology wants her to have 2 doses today and changed her diet to low potassuim but patient is upset about that and tearful.  -Saturday night nephrology want her to get urgent hemodialysis. .  Patient was agreeable and general surgery was gracious enough to come in place and left femoral temporary HD line. Patient underwent treatment overnight x2 hours with improvement of potassium to 4.3  -Nephrology wants to pursue tunneled HD catheter early next week and patient can get trained for home hemodialysis therapy. PD catheter will be kept in place for now. 4. Left foot discoloration of toes and edema of foot  - patient with dry tinea and is diabetic so at risk for cellulitis. Podiatry consulted. Check xray of left foot to evaluate for soft tissue gas was negative for infection but did reveal fractures of the second through fourth proximal phalanxes. Patient was fitted with a surgical shoe. She does not she cannot remember any specific traumatic event but I did ask her about motor vehicle she drives and she does get in and out of an SUV; stepping out from this higher height can clearly be risk for stress fracture  -Pain not controlled with Percocet 5/325 every 4 but increased to 7.5 mg    5. Hypothyroidism  -continue levothyroxine    6. Depression  -continue Abilify and Remeron. 7. Disposition  -home likely in next 24 hours. Case discussed with nurse in the room. Case discussed with Dr. Candelaria Crespo in the room. NOTE: This report was transcribed using voice recognition software. Every effort was made to ensure accuracy; however, inadvertent computerized transcription errors may be present.      Electronically signed by Criss Jalloh MD on 9/12/2021 at 2:50 PM

## 2021-09-12 NOTE — PROGRESS NOTES
24 hr ccpd d/c'd with last drain cancelled. Pt is + 278 mls but last drain was skipped and made first drain on 12 hr ccpd, thus accurate uf will display in a.m. Effluent is  Clear. Pt has no complaints. Dressing to pd cath changed. 12hr ccpd initiated per p/p aseptically without difficulty.

## 2021-09-13 PROBLEM — N18.9 CHRONIC RENAL FAILURE SYNDROME: Status: ACTIVE | Noted: 2021-09-13

## 2021-09-13 LAB
ANION GAP SERPL CALCULATED.3IONS-SCNC: 15 MMOL/L (ref 7–16)
ANION GAP SERPL CALCULATED.3IONS-SCNC: 6 MMOL/L (ref 7–16)
BASOPHILS ABSOLUTE: 0.08 E9/L (ref 0–0.2)
BASOPHILS RELATIVE PERCENT: 1.2 % (ref 0–2)
BUN BLDV-MCNC: 26 MG/DL (ref 6–20)
BUN BLDV-MCNC: 30 MG/DL (ref 6–20)
CALCIUM IONIZED: 1.2 MMOL/L (ref 1.15–1.33)
CALCIUM SERPL-MCNC: 7.6 MG/DL (ref 8.6–10.2)
CALCIUM SERPL-MCNC: 8.1 MG/DL (ref 8.6–10.2)
CHLORIDE BLD-SCNC: 105 MMOL/L (ref 98–107)
CHLORIDE BLD-SCNC: 109 MMOL/L (ref 98–107)
CO2: 25 MMOL/L (ref 22–29)
CO2: 30 MMOL/L (ref 22–29)
CREAT SERPL-MCNC: 4.9 MG/DL (ref 0.5–1)
CREAT SERPL-MCNC: 5.5 MG/DL (ref 0.5–1)
EOSINOPHILS ABSOLUTE: 0.46 E9/L (ref 0.05–0.5)
EOSINOPHILS RELATIVE PERCENT: 6.8 % (ref 0–6)
GFR AFRICAN AMERICAN: 11
GFR AFRICAN AMERICAN: 13
GFR NON-AFRICAN AMERICAN: 11 ML/MIN/1.73
GFR NON-AFRICAN AMERICAN: 13 ML/MIN/1.73
GLUCOSE BLD-MCNC: 66 MG/DL (ref 74–99)
GLUCOSE BLD-MCNC: 98 MG/DL (ref 74–99)
HCT VFR BLD CALC: 25 % (ref 34–48)
HEMOGLOBIN: 8 G/DL (ref 11.5–15.5)
IMMATURE GRANULOCYTES #: 0.03 E9/L
IMMATURE GRANULOCYTES %: 0.4 % (ref 0–5)
LYMPHOCYTES ABSOLUTE: 2.04 E9/L (ref 1.5–4)
LYMPHOCYTES RELATIVE PERCENT: 30.1 % (ref 20–42)
MAGNESIUM: 1.6 MG/DL (ref 1.6–2.6)
MAGNESIUM: 1.7 MG/DL (ref 1.6–2.6)
MAGNESIUM: 1.9 MG/DL (ref 1.6–2.6)
MCH RBC QN AUTO: 31.1 PG (ref 26–35)
MCHC RBC AUTO-ENTMCNC: 32 % (ref 32–34.5)
MCV RBC AUTO: 97.3 FL (ref 80–99.9)
METER GLUCOSE: 112 MG/DL (ref 74–99)
METER GLUCOSE: 56 MG/DL (ref 74–99)
METER GLUCOSE: 86 MG/DL (ref 74–99)
METER GLUCOSE: 87 MG/DL (ref 74–99)
METER GLUCOSE: 90 MG/DL (ref 74–99)
MONOCYTES ABSOLUTE: 0.39 E9/L (ref 0.1–0.95)
MONOCYTES RELATIVE PERCENT: 5.8 % (ref 2–12)
NEUTROPHILS ABSOLUTE: 3.78 E9/L (ref 1.8–7.3)
NEUTROPHILS RELATIVE PERCENT: 55.7 % (ref 43–80)
PDW BLD-RTO: 15 FL (ref 11.5–15)
PHOSPHORUS: 4.9 MG/DL (ref 2.5–4.5)
PHOSPHORUS: 5 MG/DL (ref 2.5–4.5)
PHOSPHORUS: 5.1 MG/DL (ref 2.5–4.5)
PHOSPHORUS: 5.1 MG/DL (ref 2.5–4.5)
PHOSPHORUS: 5.2 MG/DL (ref 2.5–4.5)
PHOSPHORUS: 5.7 MG/DL (ref 2.5–4.5)
PLATELET # BLD: 200 E9/L (ref 130–450)
PMV BLD AUTO: 11.1 FL (ref 7–12)
POTASSIUM SERPL-SCNC: 4.1 MMOL/L (ref 3.5–5)
POTASSIUM SERPL-SCNC: 5 MMOL/L (ref 3.5–5)
RBC # BLD: 2.57 E12/L (ref 3.5–5.5)
SODIUM BLD-SCNC: 141 MMOL/L (ref 132–146)
SODIUM BLD-SCNC: 149 MMOL/L (ref 132–146)
WBC # BLD: 6.8 E9/L (ref 4.5–11.5)

## 2021-09-13 PROCEDURE — 36592 COLLECT BLOOD FROM PICC: CPT

## 2021-09-13 PROCEDURE — 99232 SBSQ HOSP IP/OBS MODERATE 35: CPT | Performed by: STUDENT IN AN ORGANIZED HEALTH CARE EDUCATION/TRAINING PROGRAM

## 2021-09-13 PROCEDURE — 6370000000 HC RX 637 (ALT 250 FOR IP)

## 2021-09-13 PROCEDURE — 6370000000 HC RX 637 (ALT 250 FOR IP): Performed by: INTERNAL MEDICINE

## 2021-09-13 PROCEDURE — 2580000003 HC RX 258

## 2021-09-13 PROCEDURE — 80048 BASIC METABOLIC PNL TOTAL CA: CPT

## 2021-09-13 PROCEDURE — 85025 COMPLETE CBC W/AUTO DIFF WBC: CPT

## 2021-09-13 PROCEDURE — 84100 ASSAY OF PHOSPHORUS: CPT

## 2021-09-13 PROCEDURE — 82330 ASSAY OF CALCIUM: CPT

## 2021-09-13 PROCEDURE — 97165 OT EVAL LOW COMPLEX 30 MIN: CPT

## 2021-09-13 PROCEDURE — 1200000000 HC SEMI PRIVATE

## 2021-09-13 PROCEDURE — 2580000003 HC RX 258: Performed by: INTERNAL MEDICINE

## 2021-09-13 PROCEDURE — 2500000003 HC RX 250 WO HCPCS: Performed by: INTERNAL MEDICINE

## 2021-09-13 PROCEDURE — 36415 COLL VENOUS BLD VENIPUNCTURE: CPT

## 2021-09-13 PROCEDURE — 97530 THERAPEUTIC ACTIVITIES: CPT

## 2021-09-13 PROCEDURE — 83735 ASSAY OF MAGNESIUM: CPT

## 2021-09-13 PROCEDURE — 6360000002 HC RX W HCPCS

## 2021-09-13 PROCEDURE — 82962 GLUCOSE BLOOD TEST: CPT

## 2021-09-13 RX ADMIN — DEXTROSE MONOHYDRATE 12.5 G: 25 INJECTION, SOLUTION INTRAVENOUS at 14:13

## 2021-09-13 RX ADMIN — METOPROLOL TARTRATE 25 MG: 25 TABLET, FILM COATED ORAL at 21:01

## 2021-09-13 RX ADMIN — HEPARIN SODIUM 5000 UNITS: 5000 INJECTION INTRAVENOUS; SUBCUTANEOUS at 06:10

## 2021-09-13 RX ADMIN — ARIPIPRAZOLE 5 MG: 5 TABLET ORAL at 21:01

## 2021-09-13 RX ADMIN — ONDANSETRON 4 MG: 2 INJECTION INTRAMUSCULAR; INTRAVENOUS at 13:13

## 2021-09-13 RX ADMIN — SODIUM ZIRCONIUM CYCLOSILICATE 10 G: 10 POWDER, FOR SUSPENSION ORAL at 10:11

## 2021-09-13 RX ADMIN — OXYCODONE AND ACETAMINOPHEN 1.5 TABLET: 5; 325 TABLET ORAL at 10:12

## 2021-09-13 RX ADMIN — OXYCODONE AND ACETAMINOPHEN 1.5 TABLET: 5; 325 TABLET ORAL at 18:03

## 2021-09-13 RX ADMIN — PANTOPRAZOLE SODIUM 40 MG: 40 TABLET, DELAYED RELEASE ORAL at 06:10

## 2021-09-13 RX ADMIN — SEVELAMER CARBONATE 800 MG: 800 TABLET, FILM COATED ORAL at 17:08

## 2021-09-13 RX ADMIN — DEXTROSE AND SODIUM CHLORIDE: 5; 900 INJECTION, SOLUTION INTRAVENOUS at 06:15

## 2021-09-13 RX ADMIN — MIRTAZAPINE 15 MG: 15 TABLET, FILM COATED ORAL at 21:01

## 2021-09-13 RX ADMIN — SEVELAMER CARBONATE 800 MG: 800 TABLET, FILM COATED ORAL at 10:11

## 2021-09-13 RX ADMIN — HEPARIN SODIUM 5000 UNITS: 5000 INJECTION INTRAVENOUS; SUBCUTANEOUS at 14:08

## 2021-09-13 RX ADMIN — FOLIC ACID 1 MG: 1 TABLET ORAL at 10:12

## 2021-09-13 RX ADMIN — SEVELAMER CARBONATE 800 MG: 800 TABLET, FILM COATED ORAL at 12:34

## 2021-09-13 RX ADMIN — INSULIN GLARGINE 10 UNITS: 100 INJECTION, SOLUTION SUBCUTANEOUS at 21:02

## 2021-09-13 RX ADMIN — METOPROLOL TARTRATE 25 MG: 25 TABLET, FILM COATED ORAL at 10:12

## 2021-09-13 RX ADMIN — LEVOTHYROXINE SODIUM 75 MCG: 75 TABLET ORAL at 06:10

## 2021-09-13 RX ADMIN — Medication 5 ML: at 21:01

## 2021-09-13 ASSESSMENT — PAIN SCALES - GENERAL
PAINLEVEL_OUTOF10: 8
PAINLEVEL_OUTOF10: 8

## 2021-09-13 NOTE — CARE COORDINATION
SS Note: Covid negative 9/7/21. Per CM note pt is fairly independent, drives, and plans to return home with mother providing transportation home. Per rounds and review of nephrology notes pt was PD at home however will be HD at home after discharge. Pt for placement today of a tunneled dialysis catheter. SW spoke to Marie Fernández, [facility] [place], [phone], stated pt does receive her supplies from [facility] however this [facility] office manages pt's home dialysis. Marie Fernández stated she will discuss with her manager [facility] and call back.   Electronically signed by HANNAH Julien on 9/13/2021 at 10:54 AM

## 2021-09-13 NOTE — PROGRESS NOTES
aureus)     back wound abcess    Non compliance w medication regimen 2016    Other disorders of kidney and ureter     Pregnancy 2016    16 weeks    Previous  delivery affecting pregnancy, antepartum 2017    Previous stillbirth or demise, antepartum 2016    Seizure ([address] 2020    Severe pre-eclampsia in third trimester 2016    Shock liver 02/15/2021    Valvular endocarditis 11/10/2020    This Diagnosis was added to the Problem List based on transcribed orders from Dr. Matteo Vo            Past Surgical History:   Procedure Laterality Date    BACK SURGERY      abscess   [address]      x2    CHOLECYSTECTOMY, LAPAROSCOPIC N/A 2019    CHOLECYSTECTOMY LAPAROSCOPIC performed by Celi Sanders MD at [facility] N/A 2018    COLONOSCOPY WITH BIOPSY performed by Colletta Platts, MD at [address] [place] [address] N/A 2018    COLONOSCOPY WITH BIOPSY performed by Dionne Booth MD at [facility] [facility]  2012    EF 57%    ECHO COMPL W DOP COLOR FLOW  6/10/2013         EMBOLECTOMY N/A 2020    [address] [place] [address], [address] [place] [address], YULISSA -- REQS ROOM 3 performed by Savanna Hernandez MD at [address] [place] [address] Left 2021    LEFT LEG INCISION AND DRAINAGE, DEBRIDEMENT, WOUND VAC APPLICATION performed by Ayah Harris DPM at [address] [place] [address] Left 2021    LEFT LEG DEBRIDEMENT BIOPSY POSS APPLICATION WOUND VAC performed by Ayah Harris DPM at [place] [address] N/A 2020    LAPAROSCOPIC INSERTION PERITONEAL DIALYSIS CATHETER performed by Moose Livingston MD at [place] [address] ESOPHAGOGASTRODUODENOSCOPY TRANSORAL DIAGNOSTIC N/A 2018    EGD ESOPHAGOGASTRODUODENOSCOPY performed by Colletta Platts, MD at [address] [place] [address] TRANSESOPHAGEAL ECHOCARDIOGRAM N/A 10/19/2020    TRANSESOPHAGEAL ECHOCARDIOGRAM WITH BUBBLE STUDY performed by Segundo Shahid MD at [facility] ENDOSCOPY    TUNNELED VENOUS PORT PLACEMENT  04/2018    UPPER GASTROINTESTINAL ENDOSCOPY  12/18/2018    EGD BIOPSY performed by Prabhakar Garcia MD at [address] [place] [address] 10/11/2019    EGD ESOPHAGOGASTRODUODENOSCOPY performed by Rafita Brewer DO at [address] [place] [address] N/A 7/14/2021    EGD BIOPSY performed by Gage Stern MD at [facility] ENDOSCOPY      Precautions:  Fall Risk, dialysis, surgical shoe to L foot d/t non-displaced proximal phalanx fracture of toes 2, 3, 4, and 5.       Assessment of current deficits    [x] Functional mobility  [x]ADLs  [x] Strength               []Cognition    [x] Functional transfers   [x] IADLs         [] Safety Awareness   [x]Endurance    [] Fine Coordination              [x] Balance      [] Vision/perception   []Sensation     []Gross Motor Coordination  [] ROM  [] Delirium                   [] Motor Control     OT PLAN OF CARE   OT POC based on physician orders, patient diagnosis and results of clinical assessment    Frequency/Duration 1-3 days/wk for 2 weeks PRN     Specific OT Treatment Interventions to include:   * Instruction/training on adapted ADL techniques and AE recommendations to increase functional independence within precautions       * Training on energy conservation strategies, correct breathing pattern and techniques to improve independence/tolerance for self-care routine  * Functional transfer/mobility training/DME recommendations for increased independence, safety, and fall prevention  * Patient/Family education to increase follow through with safety techniques and functional independence  * Recommendation of environmental modifications for increased safety with functional transfers/mobility and ADLs  * Therapeutic exercise to improve motor endurance, ROM, and functional strength for ADLs/functional transfers  * Therapeutic activities to facilitate/challenge dynamic balance, stand tolerance for Treatment Time: 8       Min Units   OT Eval Low 97165  X  1    OT Eval Medium 57992      OT Eval High 90634      OT Re-Eval [id number]            ADL/Self Care 48794     Therapeutic Activities 27132  8      Therapeutic Ex 52215       Orthotic Management 64961       Manual 02096     Neuro Re-Ed 86607       Non-Billable Time        Evaluation Time additionally includes thorough review of current medical information, gathering information on past medical history/social history and prior level of function, interpretation of standardized testing/informal observation of tasks, assessment of data and development of plan of care and goals.         Evaluating OT: Oneda Part OTR/L; [id number]

## 2021-09-13 NOTE — PROGRESS NOTES
[address] [place] [address] Hospitalist   Progress Note    Admitting Date and Time: 9/7/2021 10:04 PM  Admit Dx: Hyperkalemia [E87.5]  Hyperglycemia [R73.9]  Uncontrolled type 1 diabetes mellitus with hyperglycemia ([address] [E10.65]    Subjective:    9/8: Pt stating having a lot of pain in abdomen. 9/9: Patient states he is having some abdominal cramping thinks she may be starting her period. She was asking for pain medication we discussed transitioning her to oral meds after 1 more dose of Dilaudid    9/10: Nephrology was in earlier today and discoloration of patient's toes was brought to his attention. He was concerned that this was a vascular issue. However, on my examination this appears to be may be early cellulitis as there is quite a bit of swelling in the foot and toes. X-ray ordered and podiatry consult. 9/11: Patient sitting up in bed she is tearful that she is not going home. Nephrology nurse practitioner at bedside. 9/12: Patient resting comfortably. Dr. Yuliet Faust of nephrology in the room discussed transitioning patient to hemodialysis instead of peritoneal dialysis as her potassium has been difficult to control. 9/13: Patient resting comfortably in bed. Reports being tired and hungry and having some pain in her foot but no other complaints. Spoke with nephrology and requesting [facility] for [facility] catheter placement as there is no vascular surgeon at this facility.     Per RN: Patient had hemodialysis overnight and follow potassium came back at 4.3     gentamicin   Topical Daily    sodium zirconium cyclosilicate  10 g Oral Daily    sodium chloride flush  5-40 mL IntraVENous 2 times per day    heparin (porcine)  5,000 Units SubCUTAneous 3 times per day    pantoprazole  40 mg Oral QAM AC    folic acid  1 mg Oral Daily    levothyroxine  75 mcg Oral Daily    metoprolol tartrate  25 mg Oral BID    sevelamer  800 mg Oral TID WC    mirtazapine  15 mg Oral Nightly  ARIPiprazole  5 mg Oral Nightly    insulin glargine  10 Units SubCUTAneous BID    insulin lispro  0-6 Units SubCUTAneous TID WC    insulin lispro  0-3 Units SubCUTAneous Nightly     oxyCODONE-acetaminophen, 1.5 tablet, Q4H PRN  loperamide, 4 mg, TID PRN  dextrose, 12.5 g, PRN  sodium chloride flush, 10 mL, PRN  sodium chloride, 25 mL, PRN  ondansetron, 4 mg, Q8H PRN   Or  ondansetron, 4 mg, Q6H PRN  polyethylene glycol, 17 g, Daily PRN  acetaminophen, 650 mg, Q6H PRN   Or  acetaminophen, 650 mg, Q6H PRN  hydrALAZINE, 10 mg, Q6H PRN  dextrose, 12.5 g, PRN         Objective:    /69   Pulse 104   Temp 98.2 °F (36.8 °C) (Oral)   Resp 16   Ht 5' 4\" (1.626 m)   Wt 169 lb 12.1 oz (77 kg)   SpO2 97%   BMI 29.14 kg/m²   General Appearance: alert and in no acute distress  Skin: warm and dry  Head: normocephalic and atraumatic  Eyes: pupils equal, round, and reactive to light, extraocular eye movements intact, conjunctivae normal  Neck: neck supple and non tender without mass   Pulmonary/Chest: clear to auscultation bilaterally- no wheezes, rales or rhonchi, normal air movement, no respiratory distress  Cardiovascular: normal rate, normal S1 and S2 and no carotid bruits  Abdomen: soft, mild diffuse, non-distended, normal bowel sounds, no masses or organomegaly  Extremities: left foot in surgical shoe. Neurologic: no cranial nerve deficit and speech normal      Recent Labs     09/12/21  0804 09/12/21  1642 09/13/21  0400    142 141   K 4.4 4.2 4.1    104 105   CO2 30* 30* 30*   BUN 25* 26* 26*   CREATININE 4.4* 4.6* 4.9*   GLUCOSE 123* 97 98   CALCIUM 7.1* 7.2* 7.6*       No results for input(s): ALKPHOS, PROT, LABALBU, BILITOT, AST, ALT in the last 72 hours.     Recent Labs     09/11/21  0615 09/12/21  0804 09/13/21  0400   WBC 5.5 5.2 6.8   RBC 3.05* 2.75* 2.57*   HGB 9.3* 8.5* 8.0*   HCT 29.0* 26.4* 25.0*   MCV 95.1 96.0 97.3   MCH 30.5 30.9 31.1   MCHC 32.1 32.2 32.0   RDW 14.6 14.9 15.0  192 200   MPV 11.2 11.5 11.1       Body Fluid Cell Count with Differential [[id number]] Collected: 09/10/21 1450     Specimen: Peritoneal Dialysis Fluid Updated: 09/10/21 1516      Cell Count Fluid Type Perit. Dialysis      Color, Fluid Colorless      Appearance, Fluid Clear      Nucl Cell, Fluid 13 /uL       RBC, Fluid <2,000 /uL       Neutrophil Count, Fluid 23 %       Monocyte Count, Fluid 77 %          COVID-19, Rapid [[id number]] Collected: 09/07/21 2325     Specimen: Nasopharyngeal Swab Updated: 09/08/21 0008      SARS-CoV-2, NAAT Not Detected       Radiology:   XR FOOT LEFT (MIN 3 VIEWS)   Final Result   Fractures proximal portions of the proximal phalanx of the 2nd through 5th   digits of unknown acuity. XR CHEST PORTABLE   Final Result   No acute cardiopulmonary process. Assessment:  Active Problems:    Uncontrolled type 1 diabetes mellitus with hyperglycemia, with long-term current use of insulin (HCC)    End stage renal disease (HCC)    Hyperkalemia  Resolved Problems:    * No resolved hospital problems. *      Plan:    1. Type I DM with DKA vs HHS  - patient was on insulin drip overnight. , place on Lantus 10 units bid (new increased outpatient dose) and SSI. -blood sugar has been within range this morning  -patient not using insulin pump as sensor was lost when she came in as cardiac arrest back in Feb 2021. RN called endocrine office and paperwork in place for new sensor but too soon to get insurance to cover as will be cash and runs appx $160.    2. ESRD on PD with abdominal pain  - will check cell count with effluent to see if there is evidence of peritonitis. Hold off on antibiotics for now. This was not sent Wed and Thurs but did get sent Friday and shows no evidence of peritonitis. -Dilaudid pain panel intially but stopped after dose 9/9  morning changed to Percocet 5/325 mg q6 prn for pain.   -Emergent HD treatment Saturday night.   Currently on continuous peritoneal dialysis for today  -Discussed with nephrology, will be transitioning patient to HD, needs [facility] placement, so needs to be transferred to [facility] for vascular surgery. 3. Hyperkalemia  - addressed with PD with K improving from 7.0-->5.1. Remains elevated above 6 and given Lokelma. Nephrology wants her to have 2 doses today and changed her diet to low potassuim but patient is upset about that and tearful.  -Saturday night nephrology want her to get urgent hemodialysis. .  Patient was agreeable and general surgery was gracious enough to come in place and left femoral temporary HD line. Patient underwent treatment overnight x2 hours with improvement of potassium to 4.3  -Nephrology wants to pursue tunneled HD catheter today and patient can get trained for home hemodialysis therapy. PD catheter will be kept in place for now. -Transfer to [facility] today for [facility] placement  -Potassium 4.1 today    4. Left foot discoloration of toes and edema of foot  - patient with dry tinea and is diabetic so at risk for cellulitis. Podiatry consulted. Check xray of left foot to evaluate for soft tissue gas was negative for infection but did reveal fractures of the second through fourth proximal phalanxes. Patient was fitted with a surgical shoe. She does not she cannot remember any specific traumatic event but I did ask her about motor vehicle she drives and she does get in and out of an SUV; stepping out from this higher height can clearly be risk for stress fracture  -Pain not controlled with Percocet 5/325 every 4 but increased to 7.5 mg  -Cleared from podiatry point of view    5. Hypothyroidism  -continue levothyroxine    6. Depression  -continue Abilify and Remeron. 7. Disposition  -transfer to [address] [place] [address] for [facility] placement      NOTE: This report was transcribed using voice recognition software.  Every effort was made to ensure accuracy; however, inadvertent computerized transcription errors may be present.      Electronically signed by Maday Roth MD on 9/13/2021 at 3:40 PM

## 2021-09-13 NOTE — PROGRESS NOTES
Department of Podiatry  Resident Progress Note    Subjective  Patient seen bedside for broken toes 2-5 on the left foot . No acute events overnight. Patient denies any N/V/D/F/C/SOB/CP and has no other complaints at this time. She relates she still feel mild pain in her left foot due to her broken toes.      Objective: Patient is seen with surgical shoe on the left foot    Past Medical History:   Diagnosis Date    Acute congestive heart failure ([address]     BC (acute kidney injury) ([address] 10/01/2019    Cardiac arrest ([address] 02/15/2021    Cephalgia 10/09/2019    Chronic kidney disease     Depression     Diabetes mellitus ([address]     Diabetic gastroparesis associated with type 1 diabetes mellitus ([address] 2018    Diabetic ketoacidosis ([address] 2011    Diabetic ketoacidosis with coma associated with type 1 diabetes mellitus ([address] 2013    Diabetic polyneuropathy associated with type 1 diabetes mellitus ([address] 2020    Drug use complicating pregnancy in third trimester     Endocarditis 10/31/2020    ESRD (end stage renal disease) ([address] 2020    H/O cardiovascular stress test 2021    Lexiscan    Hemodialysis patient [facility])     History of blood transfusion 2019    Hyperlipidemia 10/08/2020    Hyperosmolar hyperglycemic state (HHS) ([address] 2020    Hypothyroidism 10/08/2020    Iron deficiency anemia 10/01/2019    MDRO (multiple drug resistant organisms) resistance     MRSA (methicillin resistant Staphylococcus aureus)     back wound abcess    Non compliance w medication regimen 2016    Other disorders of kidney and ureter     Pregnancy 2016    16 weeks    Previous  delivery affecting pregnancy, antepartum 2017    Previous stillbirth or demise, antepartum 2016    Seizure ([address] 2020    Severe pre-eclampsia in third trimester 2016    Shock liver 02/15/2021    Valvular endocarditis 11/10/2020    This Diagnosis was added to the Problem List based on transcribed orders from Dr. Justin Hopper          Past Surgical History:   Procedure Laterality Date    BACK SURGERY      abscess     SECTION      x2    CHOLECYSTECTOMY, LAPAROSCOPIC N/A 2019    CHOLECYSTECTOMY LAPAROSCOPIC performed by Tobin Machuca MD at [facility] N/A 2018    COLONOSCOPY WITH BIOPSY performed by Marcianne Favre, MD at [address] [place] [address] COLONOSCOPY N/A 2018    COLONOSCOPY WITH BIOPSY performed by Jael Kay MD at [address]  2012    EF 57%    ECHO COMPL W DOP COLOR FLOW  6/10/2013         EMBOLECTOMY N/A 2020    [address] [place] [address], [id number] [place] [address], YULISSA -- REQS ROOM 3 performed by Gilford Spitz, MD at [address] [place] [address] Left 2021    LEFT LEG INCISION AND DRAINAGE, DEBRIDEMENT, WOUND VAC APPLICATION performed by Seema Driver DPM at [address] [place] [address] Left 2021    LEFT LEG DEBRIDEMENT BIOPSY POSS APPLICATION WOUND VAC performed by Seema Driver DPM at [place] [address] N/A 2020    LAPAROSCOPIC INSERTION PERITONEAL DIALYSIS CATHETER performed by Nate Olmos MD at [address] [place] [address] ESOPHAGOGASTRODUODENOSCOPY TRANSORAL DIAGNOSTIC N/A 2018    EGD ESOPHAGOGASTRODUODENOSCOPY performed by Marcianne Favre, MD at [address] [place] [address] TRANSESOPHAGEAL ECHOCARDIOGRAM N/A 10/19/2020    TRANSESOPHAGEAL ECHOCARDIOGRAM WITH BUBBLE STUDY performed by Dipak Rodriguez MD at [facility] [address] [address]  2018    UPPER GASTROINTESTINAL ENDOSCOPY  2018    EGD BIOPSY performed by Jael Kay MD at [address] [place] [address] [facility] N/A 10/11/2019    EGD ESOPHAGOGASTRODUODENOSCOPY performed by Divine Wray DO at [address] [place] [address] [facility] N/A 2021    EGD BIOPSY performed by Tori Carmona MD at [address] [place] [address] History   Problem Relation Age of Onset    Asthma Mother     Hypertension Mother     High Blood Pressure Mother     Diabetes Mother     Asthma Brother     High Blood Pressure Father        Allergies   Allergen Reactions    Cefepime Other (See Comments)     \" neurological side effect- slurred speech and confusion    Toradol [Ketorolac Tromethamine] Anaphylaxis and Hives     Exam: Grossly unchanged. NEUROLOGICAL EXAMINATION:  Protective sensation absent to bilateral  lower extremities. Epicritic sensation is otherwise intact. VASCULAR EXAMINATION:  DP and PT pulses are +2. Capillary refill is  brisk at the toes. Skin temperature is warm, proximal to distal.  DERMATOLOGICAL EXAMINATION:  Hyperpigmentation noted to the left  anterior shin at the site of previous ulceration and also to the second  lesser toes with associated erythema. MUSCULOSKELETAL EXAMINATION:  Pain to palpation to the base of toes 2,  3, 4, and 5. Range of motion of the lesser MTP joints is limited with  pain.     CBC with Differential:    Lab Results   Component Value Date    WBC 6.8 09/13/2021    RBC 2.57 09/13/2021    HGB 8.0 09/13/2021    HCT 25.0 09/13/2021     09/13/2021    MCV 97.3 09/13/2021    MCH 31.1 09/13/2021    MCHC 32.0 09/13/2021    RDW 15.0 09/13/2021    NRBC 0.9 08/10/2020    SEGSPCT 67 06/27/2013    METASPCT 1.7 08/10/2020    LYMPHOPCT 30.1 09/13/2021    MONOPCT 5.8 09/13/2021    BASOPCT 1.2 09/13/2021    MONOSABS 0.39 09/13/2021    LYMPHSABS 2.04 09/13/2021    EOSABS 0.46 09/13/2021    BASOSABS 0.08 09/13/2021     Hemoglobin/Hematocrit:    Lab Results   Component Value Date    HGB 8.0 09/13/2021    HCT 25.0 09/13/2021       Scheduled Meds:   gentamicin   Topical Daily    sodium zirconium cyclosilicate  10 g Oral Daily    sodium chloride flush  5-40 mL IntraVENous 2 times per day    heparin (porcine)  5,000 Units SubCUTAneous 3 times per day    pantoprazole  40 mg Oral QAM AC    folic acid  1 mg Oral Daily    levothyroxine  75 mcg Oral Daily    metoprolol tartrate  25 mg Oral BID    sevelamer  800 mg Oral TID     mirtazapine  15 mg Oral Nightly    ARIPiprazole  5 mg Oral Nightly    insulin glargine  10 Units SubCUTAneous BID    insulin lispro  0-6 Units SubCUTAneous TID     insulin lispro  0-3 Units SubCUTAneous Nightly     Continuous Infusions:   sodium chloride       PRN Meds:.oxyCODONE-acetaminophen, loperamide, dextrose, sodium chloride flush, sodium chloride, ondansetron **OR** ondansetron, polyethylene glycol, acetaminophen **OR** acetaminophen, hydrALAZINE, dextrose    Diagnostics      Assessment  1. Non-displaced proximal phalanx fracture of toes 2, 3, 4, and 5  2. Type 1 diabetes mellitus with neuropathy. 3.  End-stage renal disease, on peritoneal dialysis. Plan  - Patient was examined and evaluated. - Reviewed patient's recent lab results and pertinent diagnostic imaging.  - Reviewed ancillary service notes. - Patient to continue wearing surgical on left foot  - Patient is clear from podiatry, can discharge when medically stable. To follow up in clinic with Dr. Wells Parents 1 week after discharge. - Will continue to follow patient while they are in-house.     D/w: Peg Bowen DPM FACFAS  Fellowship-Trained Foot and Ankle Surgeon  Diplomate, American Board of Foot and Ankle Surgeons  [phone]

## 2021-09-13 NOTE — PROGRESS NOTES
Component Value Date    WBC 6.8 09/13/2021    RBC 2.57 09/13/2021    HGB 8.0 09/13/2021    HCT 25.0 09/13/2021    MCV 97.3 09/13/2021    MCH 31.1 09/13/2021    MCHC 32.0 09/13/2021    RDW 15.0 09/13/2021     09/13/2021    MPV 11.1 09/13/2021     CMP:    Lab Results   Component Value Date     09/13/2021    K 4.1 09/13/2021    K 4.0 08/26/2021     09/13/2021    CO2 30 09/13/2021    BUN 26 09/13/2021    CREATININE 4.9 09/13/2021    GFRAA 13 09/13/2021    LABGLOM 13 09/13/2021    GLUCOSE 98 09/13/2021    GLUCOSE 130 05/18/2012    PROT 5.7 09/07/2021    LABALBU 2.5 09/07/2021    LABALBU 4.1 05/18/2012    CALCIUM 7.6 09/13/2021    BILITOT <0.2 09/07/2021    ALKPHOS 251 09/07/2021    AST 80 09/07/2021    ALT 41 09/07/2021     Magnesium:    Lab Results   Component Value Date    MG 1.7 09/13/2021     Phosphorus:    Lab Results   Component Value Date    PHOS 5.1 09/13/2021     Radiology Review:         CXR 9/08/2021   No acute cardiopulmonary process.                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 Upon arrival to ER her blood glucose was found to be 1087 and her potassium 7. Patient was started on insulin infusion and emergent PD initiated in ER. We are consulted for hyperkalemia and ESRD on PD. Problems resolved:    · DKA, initial blood glucose 1087, improved and no longer on insulin drip  · Abdominal pain, PD cell count 13, neutrophil count 23. IMPRESSION/RECOMMENDATIONS:     1. ESRD on PD, CCPD, 12 L total per day, 5 exchanges of 2 L of 1.5% at night with a long dwell of 2 L of 2.5%.   Had HD yesterday for hyperkalemia. Had a lengthy discussion with [place] about considering changing modality to long-term HD. *Patient is agreeable to home HD, will consult vascular for placement of  tunneled dialysis catheter. 2. Hyperkalemia, potassium levels improved after HD, to continue PD and Lokelma 10 g daily  3. HTN, on carvedilol  4. MBD of CKD, on sevelamer 800 mg tid  5. Anemia of CKD, on epoetin alpha at home  -------------------------------------------------------------------  5. History of gastroparesis, on metoclopramide  6. PVD? left foot ischemia, no plans for intervention from podiatry. X-ray demonstrates broken toes left foot 2-5. Plan:    · Continue CCPD, 5 exchanges of 2 L of 1.5% at night with a long dwell of 2 L of 2.5%. Total volume 2 liters.    · Continue to monitor potassium levels daily  · Continue Lokelma 10 g p.o. daily  · Continue low potassium diet  · Continue Sevelamer 800 mg tid  · Repeat BMP 4 pm  · Consult vascular for placement of tunneled dialysis catheter  · Obtain ionized calcium    Electronically signed by HEBER Dennis CNP on 9/13/2021 at 8:11 AM

## 2021-09-14 LAB
ANION GAP SERPL CALCULATED.3IONS-SCNC: 7 MMOL/L (ref 7–16)
BASOPHILS ABSOLUTE: 0.07 E9/L (ref 0–0.2)
BASOPHILS RELATIVE PERCENT: 1.1 % (ref 0–2)
BUN BLDV-MCNC: 30 MG/DL (ref 6–20)
CALCIUM SERPL-MCNC: 8.1 MG/DL (ref 8.6–10.2)
CHLORIDE BLD-SCNC: 103 MMOL/L (ref 98–107)
CO2: 31 MMOL/L (ref 22–29)
CREAT SERPL-MCNC: 5.1 MG/DL (ref 0.5–1)
EOSINOPHILS ABSOLUTE: 0.51 E9/L (ref 0.05–0.5)
EOSINOPHILS RELATIVE PERCENT: 7.8 % (ref 0–6)
GFR AFRICAN AMERICAN: 12
GFR NON-AFRICAN AMERICAN: 12 ML/MIN/1.73
GLUCOSE BLD-MCNC: 55 MG/DL (ref 74–99)
HCT VFR BLD CALC: 27.7 % (ref 34–48)
HEMOGLOBIN: 8.6 G/DL (ref 11.5–15.5)
IMMATURE GRANULOCYTES #: 0.01 E9/L
IMMATURE GRANULOCYTES %: 0.2 % (ref 0–5)
LYMPHOCYTES ABSOLUTE: 1.85 E9/L (ref 1.5–4)
LYMPHOCYTES RELATIVE PERCENT: 28.2 % (ref 20–42)
MAGNESIUM: 1.8 MG/DL (ref 1.6–2.6)
MCH RBC QN AUTO: 30.5 PG (ref 26–35)
MCHC RBC AUTO-ENTMCNC: 31 % (ref 32–34.5)
MCV RBC AUTO: 98.2 FL (ref 80–99.9)
METER GLUCOSE: 104 MG/DL (ref 74–99)
METER GLUCOSE: 144 MG/DL (ref 74–99)
METER GLUCOSE: 46 MG/DL (ref 74–99)
METER GLUCOSE: 57 MG/DL (ref 74–99)
METER GLUCOSE: 74 MG/DL (ref 74–99)
METER GLUCOSE: 82 MG/DL (ref 74–99)
METER GLUCOSE: 85 MG/DL (ref 74–99)
MONOCYTES ABSOLUTE: 0.48 E9/L (ref 0.1–0.95)
MONOCYTES RELATIVE PERCENT: 7.3 % (ref 2–12)
NEUTROPHILS ABSOLUTE: 3.65 E9/L (ref 1.8–7.3)
NEUTROPHILS RELATIVE PERCENT: 55.4 % (ref 43–80)
PDW BLD-RTO: 14.9 FL (ref 11.5–15)
PHOSPHORUS: 5.2 MG/DL (ref 2.5–4.5)
PHOSPHORUS: 5.6 MG/DL (ref 2.5–4.5)
PHOSPHORUS: 5.9 MG/DL (ref 2.5–4.5)
PLATELET # BLD: 219 E9/L (ref 130–450)
PMV BLD AUTO: 10.7 FL (ref 7–12)
POTASSIUM SERPL-SCNC: 4.3 MMOL/L (ref 3.5–5)
RBC # BLD: 2.82 E12/L (ref 3.5–5.5)
SODIUM BLD-SCNC: 141 MMOL/L (ref 132–146)
WBC # BLD: 6.6 E9/L (ref 4.5–11.5)

## 2021-09-14 PROCEDURE — 83735 ASSAY OF MAGNESIUM: CPT

## 2021-09-14 PROCEDURE — 90945 DIALYSIS ONE EVALUATION: CPT

## 2021-09-14 PROCEDURE — 6370000000 HC RX 637 (ALT 250 FOR IP): Performed by: INTERNAL MEDICINE

## 2021-09-14 PROCEDURE — 36591 DRAW BLOOD OFF VENOUS DEVICE: CPT

## 2021-09-14 PROCEDURE — 6360000002 HC RX W HCPCS

## 2021-09-14 PROCEDURE — 84100 ASSAY OF PHOSPHORUS: CPT

## 2021-09-14 PROCEDURE — 36415 COLL VENOUS BLD VENIPUNCTURE: CPT

## 2021-09-14 PROCEDURE — 97116 GAIT TRAINING THERAPY: CPT

## 2021-09-14 PROCEDURE — 2580000003 HC RX 258

## 2021-09-14 PROCEDURE — 6370000000 HC RX 637 (ALT 250 FOR IP)

## 2021-09-14 PROCEDURE — 1200000000 HC SEMI PRIVATE

## 2021-09-14 PROCEDURE — 2500000003 HC RX 250 WO HCPCS: Performed by: INTERNAL MEDICINE

## 2021-09-14 PROCEDURE — 99232 SBSQ HOSP IP/OBS MODERATE 35: CPT | Performed by: STUDENT IN AN ORGANIZED HEALTH CARE EDUCATION/TRAINING PROGRAM

## 2021-09-14 PROCEDURE — 82962 GLUCOSE BLOOD TEST: CPT

## 2021-09-14 PROCEDURE — 85025 COMPLETE CBC W/AUTO DIFF WBC: CPT

## 2021-09-14 PROCEDURE — 97110 THERAPEUTIC EXERCISES: CPT

## 2021-09-14 PROCEDURE — 80048 BASIC METABOLIC PNL TOTAL CA: CPT

## 2021-09-14 RX ADMIN — LOPERAMIDE HYDROCHLORIDE 4 MG: 2 CAPSULE ORAL at 16:58

## 2021-09-14 RX ADMIN — PANTOPRAZOLE SODIUM 40 MG: 40 TABLET, DELAYED RELEASE ORAL at 06:32

## 2021-09-14 RX ADMIN — SEVELAMER CARBONATE 800 MG: 800 TABLET, FILM COATED ORAL at 13:15

## 2021-09-14 RX ADMIN — OXYCODONE AND ACETAMINOPHEN 1.5 TABLET: 5; 325 TABLET ORAL at 17:54

## 2021-09-14 RX ADMIN — OXYCODONE AND ACETAMINOPHEN 1.5 TABLET: 5; 325 TABLET ORAL at 09:05

## 2021-09-14 RX ADMIN — DEXTROSE MONOHYDRATE 12.5 G: 25 INJECTION, SOLUTION INTRAVENOUS at 06:34

## 2021-09-14 RX ADMIN — HEPARIN SODIUM 5000 UNITS: 5000 INJECTION INTRAVENOUS; SUBCUTANEOUS at 07:38

## 2021-09-14 RX ADMIN — OXYCODONE AND ACETAMINOPHEN 1.5 TABLET: 5; 325 TABLET ORAL at 21:46

## 2021-09-14 RX ADMIN — DEXTROSE MONOHYDRATE 12.5 G: 25 INJECTION, SOLUTION INTRAVENOUS at 22:36

## 2021-09-14 RX ADMIN — MIRTAZAPINE 15 MG: 15 TABLET, FILM COATED ORAL at 21:09

## 2021-09-14 RX ADMIN — HEPARIN SODIUM 5000 UNITS: 5000 INJECTION INTRAVENOUS; SUBCUTANEOUS at 13:16

## 2021-09-14 RX ADMIN — FOLIC ACID 1 MG: 1 TABLET ORAL at 09:05

## 2021-09-14 RX ADMIN — Medication 10 ML: at 09:11

## 2021-09-14 RX ADMIN — OXYCODONE AND ACETAMINOPHEN 1.5 TABLET: 5; 325 TABLET ORAL at 13:15

## 2021-09-14 RX ADMIN — ARIPIPRAZOLE 5 MG: 5 TABLET ORAL at 21:08

## 2021-09-14 RX ADMIN — INSULIN LISPRO 1 UNITS: 100 INJECTION, SOLUTION INTRAVENOUS; SUBCUTANEOUS at 18:44

## 2021-09-14 RX ADMIN — LEVOTHYROXINE SODIUM 75 MCG: 75 TABLET ORAL at 06:32

## 2021-09-14 RX ADMIN — SODIUM ZIRCONIUM CYCLOSILICATE 10 G: 10 POWDER, FOR SUSPENSION ORAL at 09:05

## 2021-09-14 RX ADMIN — SEVELAMER CARBONATE 800 MG: 800 TABLET, FILM COATED ORAL at 16:58

## 2021-09-14 RX ADMIN — SEVELAMER CARBONATE 800 MG: 800 TABLET, FILM COATED ORAL at 09:05

## 2021-09-14 RX ADMIN — METOPROLOL TARTRATE 25 MG: 25 TABLET, FILM COATED ORAL at 09:05

## 2021-09-14 RX ADMIN — METOPROLOL TARTRATE 25 MG: 25 TABLET, FILM COATED ORAL at 21:09

## 2021-09-14 ASSESSMENT — PAIN SCALES - GENERAL
PAINLEVEL_OUTOF10: 4
PAINLEVEL_OUTOF10: 7
PAINLEVEL_OUTOF10: 5
PAINLEVEL_OUTOF10: 8
PAINLEVEL_OUTOF10: 8
PAINLEVEL_OUTOF10: 10
PAINLEVEL_OUTOF10: 7

## 2021-09-14 ASSESSMENT — PAIN DESCRIPTION - PAIN TYPE
TYPE: CHRONIC PAIN
TYPE: CHRONIC PAIN

## 2021-09-14 ASSESSMENT — PAIN DESCRIPTION - ORIENTATION
ORIENTATION: LEFT
ORIENTATION: LEFT

## 2021-09-14 ASSESSMENT — PAIN DESCRIPTION - LOCATION
LOCATION: ANKLE
LOCATION: ANKLE

## 2021-09-14 ASSESSMENT — PAIN DESCRIPTION - DESCRIPTORS
DESCRIPTORS: ACHING;DISCOMFORT;SORE
DESCRIPTORS: ACHING;DISCOMFORT;SORE

## 2021-09-14 NOTE — PROGRESS NOTES
[address] [place], [place] [address]ist   Progress Note    Admitting Date and Time: 9/7/2021 10:04 PM  Admit Dx: Hyperkalemia [E87.5]  Hyperglycemia [R73.9]  Uncontrolled type 1 diabetes mellitus with hyperglycemia ([facility] [E10.65]    Subjective:    9/8: Pt stating having a lot of pain in abdomen. 9/9: Patient states he is having some abdominal cramping thinks she may be starting her period. She was asking for pain medication we discussed transitioning her to oral meds after 1 more dose of Dilaudid    9/10: Nephrology was in earlier today and discoloration of patient's toes was brought to his attention. He was concerned that this was a vascular issue. However, on my examination this appears to be may be early cellulitis as there is quite a bit of swelling in the foot and toes. X-ray ordered and podiatry consult. 9/11: Patient sitting up in bed she is tearful that she is not going home. Nephrology nurse practitioner at bedside. 9/12: Patient resting comfortably. Dr. Sanford Cuadra of nephrology in the room discussed transitioning patient to hemodialysis instead of peritoneal dialysis as her potassium has been difficult to control. 9/13: Patient resting comfortably in bed. Reports being tired and hungry and having some pain in her foot but no other complaints. Spoke with nephrology and requesting a transfer be made to [facility] for [facility] catheter placement as there is no vascular surgeon at this facility. 9/14: Patient resting comfortably in bed. Has no complaints at this time. Was informed that IR would be able to do [facility] catheter, so no need for transfer anymore. Spoke with nephrologist, stated can be discharged once has the [facility] placed. States that he will have the patient trained starting Monday for HD. Per RN: Per IR, will do patient's [facility] tomorrow as needs to be NPO for 6hrs.      gentamicin   Topical Daily    sodium chloride flush  5-40 mL IntraVENous 2 times per day    heparin (porcine) 5,000 Units SubCUTAneous 3 times per day    pantoprazole  40 mg Oral QAM AC    folic acid  1 mg Oral Daily    levothyroxine  75 mcg Oral Daily    metoprolol tartrate  25 mg Oral BID    sevelamer  800 mg Oral TID WC    mirtazapine  15 mg Oral Nightly    ARIPiprazole  5 mg Oral Nightly    insulin glargine  10 Units SubCUTAneous BID    insulin lispro  0-6 Units SubCUTAneous TID WC    insulin lispro  0-3 Units SubCUTAneous Nightly     oxyCODONE-acetaminophen, 1.5 tablet, Q4H PRN  loperamide, 4 mg, TID PRN  dextrose, 12.5 g, PRN  sodium chloride flush, 10 mL, PRN  sodium chloride, 25 mL, PRN  ondansetron, 4 mg, Q8H PRN   Or  ondansetron, 4 mg, Q6H PRN  polyethylene glycol, 17 g, Daily PRN  acetaminophen, 650 mg, Q6H PRN   Or  acetaminophen, 650 mg, Q6H PRN  hydrALAZINE, 10 mg, Q6H PRN  dextrose, 12.5 g, PRN         Objective:    BP (!) 188/86   Pulse 88   Temp 97.5 °F (36.4 °C) (Oral)   Resp 18   Ht 5' 4\" (1.626 m)   Wt 169 lb 12.1 oz (77 kg)   SpO2 99%   BMI 29.14 kg/m²   General Appearance: alert and in no acute distress  Skin: warm and dry  Head: normocephalic and atraumatic  Eyes: pupils equal, round, and reactive to light, extraocular eye movements intact, conjunctivae normal  Neck: neck supple and non tender without mass   Pulmonary/Chest: clear to auscultation bilaterally- no wheezes, rales or rhonchi, normal air movement, no respiratory distress  Cardiovascular: normal rate, normal S1 and S2 and no carotid bruits  Abdomen: soft, mild diffuse, non-distended, normal bowel sounds, no masses or organomegaly  Extremities: left foot in surgical shoe.   Neurologic: no cranial nerve deficit and speech normal      Recent Labs     09/13/21  0400 09/13/21  1837 09/14/21  0820    149* 141   K 4.1 5.0 4.3    109* 103   CO2 30* 25 31*   BUN 26* 30* 30*   CREATININE 4.9* 5.5* 5.1*   GLUCOSE 98 66* 55*   CALCIUM 7.6* 8.1* 8.1*       No results for input(s): ALKPHOS, PROT, LABALBU, BILITOT, AST, ALT in

## 2021-09-14 NOTE — PROGRESS NOTES
Department of Podiatry  Resident Progress Note    Subjective  Patient seen bedside for broken toes 2-5 on the left foot . No acute events overnight. Patient is being seen with PT/OT right now. Patient denies any N/V/D/F/C/SOB/CP and has no other complaints at this time. Continues to feel mild pain in her left foot due to her broken toes.      Objective: Patient is seen with surgical shoe on the left foot    Past Medical History:   Diagnosis Date    Acute congestive heart failure ([address]     BC (acute kidney injury) ([address] 10/01/2019    Cardiac arrest ([address] 02/15/2021    Cephalgia 10/09/2019    Chronic kidney disease     Depression     Diabetes mellitus ([address]     Diabetic gastroparesis associated with type 1 diabetes mellitus ([address] 2018    Diabetic ketoacidosis ([address] 2011    Diabetic ketoacidosis with coma associated with type 1 diabetes mellitus ([address] 2013    Diabetic polyneuropathy associated with type 1 diabetes mellitus ([address] 2020    Drug use complicating pregnancy in third trimester     Endocarditis 10/31/2020    ESRD (end stage renal disease) ([address] 2020    H/O cardiovascular stress test 2021    Lexiscan    Hemodialysis patient [facility])     History of blood transfusion 2019    Hyperlipidemia 10/08/2020    Hyperosmolar hyperglycemic state (HHS) ([address] 2020    Hypothyroidism 10/08/2020    Iron deficiency anemia 10/01/2019    MDRO (multiple drug resistant organisms) resistance     MRSA (methicillin resistant Staphylococcus aureus)     back wound abcess    Non compliance w medication regimen 2016    Other disorders of kidney and ureter     Pregnancy 2016    16 weeks    Previous  delivery affecting pregnancy, antepartum 2017    Previous stillbirth or demise, antepartum 2016    Seizure ([address] 2020    Severe pre-eclampsia in third trimester 2016    Shock liver 02/15/2021    Valvular endocarditis 11/10/2020 This Diagnosis was added to the Problem List based on transcribed orders from Dr. Kane Calderon          Past Surgical History:   Procedure Laterality Date    BACK SURGERY      abscess   [address]      x2    CHOLECYSTECTOMY, LAPAROSCOPIC N/A 11/7/2019    CHOLECYSTECTOMY LAPAROSCOPIC performed by Delfina Burnette MD at [facility] N/A 6/25/2018    COLONOSCOPY WITH BIOPSY performed by Gus Marcial MD at [address] [place] [address] COLONOSCOPY N/A 12/18/2018    COLONOSCOPY WITH BIOPSY performed by Irwin Ramachandran MD at [address]  1/31/2012    EF 57%    ECHO COMPL W DOP COLOR FLOW  6/10/2013         EMBOLECTOMY N/A 11/6/2020    [address] [place] [address], [address] [facility], YULISSA -- REQS ROOM 3 performed by Jerardo Ly MD at [address] [place] [address] Left 2/23/2021    LEFT LEG INCISION AND DRAINAGE, DEBRIDEMENT, WOUND VAC APPLICATION performed by Edgar Carson DPM at [address] [place] [address] 5/18/2021    LEFT LEG DEBRIDEMENT BIOPSY POSS APPLICATION WOUND VAC performed by Edgar Carson DPM at [place] [address] N/A 6/26/2020    LAPAROSCOPIC INSERTION PERITONEAL DIALYSIS CATHETER performed by Isaiah Hernandez MD at [place] [address] ESOPHAGOGASTRODUODENOSCOPY TRANSORAL DIAGNOSTIC N/A 5/30/2018    EGD ESOPHAGOGASTRODUODENOSCOPY performed by Gus Marcial MD at [address] [place] [address] TRANSESOPHAGEAL ECHOCARDIOGRAM N/A 10/19/2020    TRANSESOPHAGEAL ECHOCARDIOGRAM WITH BUBBLE STUDY performed by Patrick Mayfield MD at [address] [place] [address] TUNNELED VENOUS PORT PLACEMENT  04/2018    UPPER GASTROINTESTINAL ENDOSCOPY  12/18/2018    EGD BIOPSY performed by Irwin Ramachandran MD at [facility] 10/11/2019    EGD ESOPHAGOGASTRODUODENOSCOPY performed by Terri Loving DO at [facility] N/A 7/14/2021    EGD BIOPSY performed by Mariana Newsome MD at [facility] Problem Relation Age of Onset    Asthma Mother     Hypertension Mother     High Blood Pressure Mother     Diabetes Mother     Asthma Brother     High Blood Pressure Father        Allergies   Allergen Reactions    Cefepime Other (See Comments)     \" neurological side effect- slurred speech and confusion    Toradol [Ketorolac Tromethamine] Anaphylaxis and Hives     Exam: Grossly unchanged. NEUROLOGICAL EXAMINATION:  Protective sensation absent to bilateral  lower extremities. Epicritic sensation is otherwise intact. VASCULAR EXAMINATION:  DP and PT pulses are +2. Capillary refill is  brisk at the toes. Skin temperature is warm, proximal to distal.  DERMATOLOGICAL EXAMINATION:  Hyperpigmentation noted to the left  anterior shin at the site of previous ulceration and also to the second  lesser toes with associated erythema. MUSCULOSKELETAL EXAMINATION:  Pain to palpation to the base of toes 2,  3, 4, and 5. Range of motion of the lesser MTP joints is limited with  pain.     CBC with Differential:    Lab Results   Component Value Date    WBC 6.6 09/14/2021    RBC 2.82 09/14/2021    HGB 8.6 09/14/2021    HCT 27.7 09/14/2021     09/14/2021    MCV 98.2 09/14/2021    MCH 30.5 09/14/2021    MCHC 31.0 09/14/2021    RDW 14.9 09/14/2021    NRBC 0.9 08/10/2020    SEGSPCT 67 06/27/2013    METASPCT 1.7 08/10/2020    LYMPHOPCT 28.2 09/14/2021    MONOPCT 7.3 09/14/2021    BASOPCT 1.1 09/14/2021    MONOSABS 0.48 09/14/2021    LYMPHSABS 1.85 09/14/2021    EOSABS 0.51 09/14/2021    BASOSABS 0.07 09/14/2021     Hemoglobin/Hematocrit:    Lab Results   Component Value Date    HGB 8.6 09/14/2021    HCT 27.7 09/14/2021       Scheduled Meds:   gentamicin   Topical Daily    sodium chloride flush  5-40 mL IntraVENous 2 times per day    heparin (porcine)  5,000 Units SubCUTAneous 3 times per day    pantoprazole  40 mg Oral QAM AC    folic acid  1 mg Oral Daily    levothyroxine  75 mcg Oral Daily    metoprolol tartrate  25 mg Oral BID    sevelamer  800 mg Oral TID     mirtazapine  15 mg Oral Nightly    ARIPiprazole  5 mg Oral Nightly    insulin glargine  10 Units SubCUTAneous BID    insulin lispro  0-6 Units SubCUTAneous TID     insulin lispro  0-3 Units SubCUTAneous Nightly     Continuous Infusions:   sodium chloride       PRN Meds:.oxyCODONE-acetaminophen, loperamide, dextrose, sodium chloride flush, sodium chloride, ondansetron **OR** ondansetron, polyethylene glycol, acetaminophen **OR** acetaminophen, hydrALAZINE, dextrose    Diagnostics      Assessment  1. Non-displaced proximal phalanx fracture of toes 2, 3, 4, and 5  2. Type 1 diabetes mellitus with neuropathy. 3.  End-stage renal disease, on peritoneal dialysis. Plan  - Patient was examined and evaluated. - Reviewed patient's recent lab results and pertinent diagnostic imaging.  - Reviewed ancillary service notes. - Patient to continue wearing surgical on left foot  - Patient is clear from podiatry, can discharge when medically stable. To follow up in clinic with Dr. Preet Poretr 1 week after discharge. - Podiatry will sign off at this time. Please don't hesitate to re-consult our service if any new podiatric issues arise. Thank you.      D/w: Abdullahi Glover DPM FACFAS  Fellowship-Trained Foot and Ankle Surgeon  Diplomate, American Board of Foot and Ankle Surgeons  [phone]

## 2021-09-14 NOTE — PROGRESS NOTES
OT SESSION ATTEMPT     Date:2021  Patient Name: Batsheva Laureano  MRN: [id number]  : 1990  Room: [address] [place] [address]     OCCUPATIONAL THERAPY TREATMENT NOTE    [address]      Attempted OT session this date:    [] unavailable due to other medical staff currently with pt   [] on hold per nursing staff   [] on hold per nursing staff secondary to lab / radiology results    [x] declined treatment  this date due to c/o pain 7/10 in L LE foot. Benefits of participation in therapy reviewed with pt.    [] off unit   [] Other:     Continue with current OT [facility] at a later date/time.       Aimee SALMON/ANDREW [id number]

## 2021-09-14 NOTE — PROGRESS NOTES
Department of Internal Medicine  Nephrology Progress  Note    Events reviewed. SUBJECTIVE:  We are following MsRolando Flores for ESRD on PD and hyperkalemia. Reports no complaints.     PHYSICAL EXAM:      Vitals:    VITALS:  BP (!) 146/91   Pulse 100   Temp 97.7 °F (36.5 °C) (Oral)   Resp 16   Ht 5' 4\" (1.626 m)   Wt 169 lb 12.1 oz (77 kg)   SpO2 94%   BMI 29.14 kg/m²   24HR INTAKE/OUTPUT:      Intake/Output Summary (Last 24 hours) at 9/14/2021 1849  Last data filed at 9/14/2021 1453  Gross per 24 hour   Intake 580 ml   Output    Net 580 ml     Access: Left femoral temporary dialysis catheter in place  PD Catheter Exam:  PD catheter exit site clean    Constitutional: Awake in no acute distress  HEENT:  Normocephalic, PERRL  Respiratory: Decreased breath sounds on the basis  Cardiovascular/Edema:  RRR, S1/S2  Gastrointestinal:  Soft, PD catheter exit site clean   Neurologic:  Nonfocal, AVELAR  Skin:  Warm, dry, no lesions  Other:  left foot cyanotic    Scheduled Meds:   gentamicin   Topical Daily    sodium chloride flush  5-40 mL IntraVENous 2 times per day    heparin (porcine)  5,000 Units SubCUTAneous 3 times per day    pantoprazole  40 mg Oral QAM AC    folic acid  1 mg Oral Daily    levothyroxine  75 mcg Oral Daily    metoprolol tartrate  25 mg Oral BID    sevelamer  800 mg Oral TID WC    mirtazapine  15 mg Oral Nightly    ARIPiprazole  5 mg Oral Nightly    insulin glargine  10 Units SubCUTAneous BID    insulin lispro  0-6 Units SubCUTAneous TID WC    insulin lispro  0-3 Units SubCUTAneous Nightly     Continuous Infusions:   sodium chloride       PRN Meds:.oxyCODONE-acetaminophen, loperamide, dextrose, sodium chloride flush, sodium chloride, ondansetron **OR** ondansetron, polyethylene glycol, acetaminophen **OR** acetaminophen, hydrALAZINE, dextrose    DATA:    CBC:   Lab Results   Component Value Date    WBC 6.6 09/14/2021    RBC 2.82 09/14/2021    HGB 8.6 09/14/2021    HCT 27.7 09/14/2021    MCV 98.2 09/14/2021    MCH 30.5 09/14/2021    MCHC 31.0 09/14/2021    RDW 14.9 09/14/2021     09/14/2021    MPV 10.7 09/14/2021     CMP:    Lab Results   Component Value Date     09/14/2021    K 4.3 09/14/2021    K 4.0 08/26/2021     09/14/2021    CO2 31 09/14/2021    BUN 30 09/14/2021    CREATININE 5.1 09/14/2021    GFRAA 12 09/14/2021    LABGLOM 12 09/14/2021    GLUCOSE 55 09/14/2021    GLUCOSE 130 05/18/2012    PROT 5.7 09/07/2021    LABALBU 2.5 09/07/2021    LABALBU 4.1 05/18/2012    CALCIUM 8.1 09/14/2021    BILITOT <0.2 09/07/2021    ALKPHOS 251 09/07/2021    AST 80 09/07/2021    ALT 41 09/07/2021     Magnesium:    Lab Results   Component Value Date    MG 1.8 09/14/2021     Phosphorus:    Lab Results   Component Value Date    PHOS 5.9 09/14/2021     Radiology Review:         CXR 9/08/2021   No acute cardiopulmonary process.                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 Upon arrival to ER her blood glucose was found to be 1087 and her potassium 7. Patient was started on insulin infusion and emergent PD initiated in ER. We are consulted for hyperkalemia and ESRD on PD. Problems resolved:    · DKA, initial blood glucose 1087, improved and no longer on insulin drip  · Abdominal pain, PD cell count 13, neutrophil count 23. IMPRESSION/RECOMMENDATIONS:     1. ESRD on PD, CCPD, 12 L total per day, 5 exchanges of 2 L of 1.5% at night with a long dwell of 2 L of 2.5%. Patient to switch to home hemodialysis. She is to have tunneled dialysis catheter placement tomorrow. 2. Hyperkalemia, potassium levels improved after HD, to continue PD and Lokelma 10 g daily  3. HTN, on carvedilol  4. MBD of CKD, on sevelamer 800 mg tid  5. Anemia of CKD, on epoetin alpha at home  -------------------------------------------------------------------  5. History of gastroparesis, on metoclopramide  6. PVD? left foot ischemia, no plans for intervention from podiatry. X-ray demonstrates broken toes left foot 2-5. Plan:    · Continue CCPD, 5 exchanges of 2 L of 1.5% at night with a long dwell of 2 L of 2.5%. Total volume 2 liters.    · For tunneled dialysis catheter placement tomorrow  · Continue to monitor potassium levels daily  · Continue Lokelma 10 g p.o. daily  · Continue low potassium diet  · Continue Sevelamer 800 mg tid  · Okay to discharge from renal point of view after dialysis catheter placed    Electronically signed by Neir Gagnon MD on 9/14/2021 at 6:49 PM

## 2021-09-14 NOTE — PROGRESS NOTES
Physical Therapy Treatment Note/Plan of Care    Room #:  0420/0420-01  Patient Name: Gordon Matias  YOB: 1990  MRN: [id number]    Date of Service: 9/14/2021     Tentative placement recommendation: Home  Equipment recommendation: None      Evaluating Physical Therapist: Kayla Bower PT #[id number]      Specific Provider Orders/Date/Referring Provider :  09/09/21 1115   PT eval and treat Start: 09/09/21 1115, End: 09/09/21 1115, ONE TIME, Standing Count: 1 Occurrences, Samy Monge MD      Admitting Diagnosis:   Hyperkalemia [E87.5]  Hyperglycemia [R73.9]  Uncontrolled type 1 diabetes mellitus with hyperglycemia (HCC) [E10.65]  hyperglycemia and body aches and nausea x 1 day      Visit Diagnoses       Codes    Hyperglycemia   (Inadequately Controlled)   R73.9    Uncontrolled type 1 diabetes mellitus with hyperglycemia (HCC)   (Inadequately Controlled)   E10.65        Surgery: -    Patient Active Problem List   Diagnosis    Non compliance w medication regimen    Tobacco smoking complicating pregnancy    MTHFR mutation    Diabetic ketoacidosis without coma associated with type 1 diabetes mellitus ([facility]    Hypertension    Prolonged QT interval    Iron deficiency anemia    Sinus tachycardia    Bladder dysfunction    Hypokalemia    Elevated troponin    Severe protein-calorie malnutrition (HCC)    Uncontrolled type 1 diabetes mellitus with hyperglycemia, with long-term current use of insulin (HCC)    End stage renal disease (HCC)    Hypothyroidism    Hyperlipidemia    Acute cystitis    Nondisplaced fracture of neck of fifth metacarpal bone, left hand, initial encounter for closed fracture    Chronic right-sided low back pain    Endocarditis    Seizure (HCC)    Hyperkalemia    Hypomagnesemia    Hypocalcemia    Intractable nausea and vomiting    Wound of left leg, sequela    Syncope    Type 1 diabetes mellitus without complication (HCC)    Hyperosmolality    Major depressive disorder    Lactic acid acidosis    Early satiety    Acute on chronic systolic CHF (congestive heart failure) (HCC)    Atypical chest pain    Chronic renal failure syndrome        ASSESSMENT of Current Deficits Patient exhibits decreased strength, balance, endurance and pain left foot and stomach impairing functional mobility, gait , gait distance and tolerance to activity dialysis done however patient still hooked to machine limiting increased gait distance and activity. Slow pacing with exercise, patient fatigued from dialysis. Slight pain with ambulation on left foot, patient educated on ambulating on heel only due to pain with toes. Patient requires skilled physical therapy to address concerns listed above to increase safety and independence at discharge. PHYSICAL THERAPY  PLAN OF CARE       Physical therapy plan of care is established based on physician order,  patient diagnosis and clinical assessment    Current Treatment Recommendations:    -Standing Balance: Perform strengthening exercises in standing to promote motor control with or without upper extremity support   -Transfers: Provide instruction on proper hand and foot position for adequate transfer of weight onto lower extremities and use of gait device if needed and Cues for hand placement, technique and safety. Provide stabilization to prevent fall   -Gait: Gait training and Standing activities to improve: base of support, weight shift, weight bearing    -Endurance: Utilize Supervised activities to increase level of endurance to allow for safe functional mobility including transfers and gait   -Stairs: Stair training with instruction on proper technique and hand placement on rail    PT long term treatment goals are located in below grid    Patient and or family understand(s) diagnosis, prognosis, and plan of care. Frequency of treatments: Patient will be seen  2-3 times/week.          Prior Level of Function: Patient ambulated independently    Rehab Potential: good    for baseline    Past medical history:   Past Medical History:   Diagnosis Date    Acute congestive heart failure ([address]     BC (acute kidney injury) ([address] 10/01/2019    Cardiac arrest ([address] 02/15/2021    Cephalgia 10/09/2019    Chronic kidney disease     Depression     Diabetes mellitus ([address]     Diabetic gastroparesis associated with type 1 diabetes mellitus ([address] 2018    Diabetic ketoacidosis ([address] 2011    Diabetic ketoacidosis with coma associated with type 1 diabetes mellitus ([address] 2013    Diabetic polyneuropathy associated with type 1 diabetes mellitus ([address] 2020    Drug use complicating pregnancy in third trimester     Endocarditis 10/31/2020    ESRD (end stage renal disease) ([address] 2020    H/O cardiovascular stress test 2021    Lexiscan    Hemodialysis patient [facility])     History of blood transfusion 2019    Hyperlipidemia 10/08/2020    Hyperosmolar hyperglycemic state (HHS) ([address] 2020    Hypothyroidism 10/08/2020    Iron deficiency anemia 10/01/2019    MDRO (multiple drug resistant organisms) resistance     MRSA (methicillin resistant Staphylococcus aureus)     back wound abcess    Non compliance w medication regimen 2016    Other disorders of kidney and ureter     Pregnancy 2016    16 weeks    Previous  delivery affecting pregnancy, antepartum 2017    Previous stillbirth or demise, antepartum 2016    Seizure ([address] 2020    Severe pre-eclampsia in third trimester 2016    Shock liver 02/15/2021    Valvular endocarditis 11/10/2020    This Diagnosis was added to the Problem List based on transcribed orders from Dr. Augustine Haile        Past Surgical History:   Procedure Laterality Date    BACK SURGERY      abscess     SECTION      x2    CHOLECYSTECTOMY, LAPAROSCOPIC N/A 2019    CHOLECYSTECTOMY LAPAROSCOPIC performed by Doug Love MD at [address] [facility] COLONOSCOPY N/A 6/25/2018    COLONOSCOPY WITH BIOPSY performed by Miladis Ferguson MD at [address] [facility] COLONOSCOPY N/A 12/18/2018    COLONOSCOPY WITH BIOPSY performed by Barrett Whiteside MD at [address]  1/31/2012    EF 57%    ECHO COMPL W DOP COLOR FLOW  6/10/2013         EMBOLECTOMY N/A 11/6/2020    [address] [place] [address], [id number] [place] [address], YULISSA -- REQS ROOM 3 performed by Angel Delgado MD at [address] [place] [address] Left 2/23/2021    LEFT LEG INCISION AND DRAINAGE, DEBRIDEMENT, WOUND VAC APPLICATION performed by Jey Wallace DPM at [address] [place] [address] Left 5/18/2021    LEFT LEG DEBRIDEMENT BIOPSY POSS APPLICATION WOUND VAC performed by Jey Wallace DPM at [place] [address] N/A 6/26/2020    LAPAROSCOPIC INSERTION PERITONEAL DIALYSIS CATHETER performed by Chelsie Mark MD at [place] [address] ESOPHAGOGASTRODUODENOSCOPY TRANSORAL DIAGNOSTIC N/A 5/30/2018    EGD ESOPHAGOGASTRODUODENOSCOPY performed by Miladis Ferguson MD at [address] [facility] TRANSESOPHAGEAL ECHOCARDIOGRAM N/A 10/19/2020    TRANSESOPHAGEAL ECHOCARDIOGRAM WITH BUBBLE STUDY performed by Hawk May MD at [address] [facility] [address] [facility]  04/2018    UPPER GASTROINTESTINAL ENDOSCOPY  12/18/2018    EGD BIOPSY performed by Barrett Whiteside MD at [address] 10/11/2019    EGD ESOPHAGOGASTRODUODENOSCOPY performed by Martin Serrato DO at [address] [facility] N/A 7/14/2021    EGD BIOPSY performed by Johnathan Bowling MD at [address] [facility]:    Precautions: Up as tolerated, falls and dialysis-peritoneal ,  Surgical shoe  Social history: Patient lives with mother and her 2 children, 3/8 yo in a two story home resides first  with 2 steps  to enter with [employer] owned: Wheelchair, Bedside commode and Shower chair,       [address] [facility]  Mobility Inpatient   How much difficulty turning over in bed?: None  How much difficulty sitting down on / standing up from a chair with arms?: A Little  How much difficulty moving from lying on back to sitting on side of bed?: None  How much help from another person moving to and from a bed to a chair?: A Little  How much help from another person needed to walk in hospital room?: A Little  How much help from another person for climbing 3-5 steps with a railing?: A Little  AM-PAC Inpatient Mobility Raw Score : 20  AM-PAC Inpatient T-Scale Score : 47.67  Mobility Inpatient CMS 0-100% Score: 35.83  Mobility Inpatient CMS G-Code Modifier : [address] cleared patient for PT treatment. OBJECTIVE;   Initial Evaluation  Date: 9/10/2021 Treatment Date:  9/14/2021     Short Term/ Long Term   Goals   Was pt agreeable to Eval/treatment? Yes Yes  To be met in 3 days   Pain level   6/10  Left foot and stomach 7/10  Left foot    Bed Mobility    Rolling: Independent    Supine to sit: Independent    Sit to supine: Independent    Scooting: Independent   Rolling: Independent   Supine to sit: Independent   Sit to supine: Independent   Scooting: Independent    Rolling: Independent    Supine to sit:  Independent    Sit to supine: Independent    Scooting: Independent     Transfers Sit to stand: Supervision    Sit to stand: Supervision       Sit to stand: Independent     Ambulation    5 feet using  no device with Supervision      2x12 feet using  no device with Supervision    with cues for sequencing and safety    50 feet using  no device with Independent    Stair negotiation: ascended and descended   Not assessed  Not assessed      2 steps 1 rail with supervision    ROM Within functional limits        Strength BUE:  refer to OT eval  RLE:  4-/5  LLE:  4-/5  Increase strength in affected mm groups by 1/3 grade   Balance Sitting EOB:  good    Dynamic Standing:  fair + Sitting EOB: good   Dynamic Standing: fair+    Sitting EOB:  good Dynamic Standing: good        Patient is Alert & Oriented x person, place, time and situation and follows directions    Sensation:  Patient  denies numbness/tingling   Edema:  no   Endurance: fair        Vitals: room air   Blood Pressure at rest  Blood Pressure during session    Heart Rate at rest   Heart Rate during session     SPO2 at rest %  SPO2 during session  %     Patient education  Patient educated on role of Physical Therapy, risks of immobility, safety and plan of care,  importance of mobility while in hospital , safety , positioning for skin integrity and comfort and gait training     Patient response to education:   Pt verbalized understanding Pt demonstrated skill Pt requires further education in this area   Yes Partial Yes      Treatment:  Patient practiced and was instructed/facilitated in the following treatment: Patient transferred to edge of bed  Sat edge of bed 10 minutes with No assist to increase dynamic sitting balance and activity tolerance. performed seated exercise, stood and ambulated in room. Transferred back to supine. podiatry present during treatment. Therapeutic Exercises:  ankle pumps, heel raises, hip abduction/adduction, straight leg raise, long arc quad and resisted bicep curl, resisted triceps 2 x 12 reps AROM      At end of session, patient in bed with   call light and phone within reach,  all lines and tubes intact, nursing notified. Patient would benefit from continued skilled Physical Therapy to improve functional independence and quality of life.          Patient's/ family goals   home    Time in  0  Time out  928    Total Treatment Time  24 minutes        CPT codes:    Therapeutic exercises (43651)   15 minutes  1 unit(s)  Gait Training (12498) 9 minutes 1 unit(s)    FRANCK Reddy#[id number]

## 2021-09-14 NOTE — CARE COORDINATION
Ss note:9/14/2021.2:34 PM Neg covid on 907-21. Per IDR pt to be transferred to [address] [place] [address] for placement of tunneled HD catheter, awaiting available bed. Pt was performing self PD at home and received supplies from [employer]. Per chart review pt has a temporary HD catheter and received one HD treatment on 9-. Pt has a hx of [employer] drive HD back in Feb of 2020. Will await nephrology plan of care to assist with appropriate transition of care plan. Pt resides her mother and plan is to return home. sw will follow.  HANNAH Looney

## 2021-09-15 ENCOUNTER — APPOINTMENT (OUTPATIENT)
Dept: ULTRASOUND IMAGING | Age: 31
DRG: 425 | End: 2021-09-15
Payer: COMMERCIAL

## 2021-09-15 ENCOUNTER — APPOINTMENT (OUTPATIENT)
Dept: INTERVENTIONAL RADIOLOGY/VASCULAR | Age: 31
DRG: 425 | End: 2021-09-15
Payer: COMMERCIAL

## 2021-09-15 VITALS
HEIGHT: 64 IN | DIASTOLIC BLOOD PRESSURE: 85 MMHG | OXYGEN SATURATION: 97 % | WEIGHT: 169.09 LBS | HEART RATE: 86 BPM | SYSTOLIC BLOOD PRESSURE: 128 MMHG | BODY MASS INDEX: 28.87 KG/M2 | RESPIRATION RATE: 17 BRPM | TEMPERATURE: 97.8 F

## 2021-09-15 LAB
ANION GAP SERPL CALCULATED.3IONS-SCNC: 8 MMOL/L (ref 7–16)
BASOPHILS ABSOLUTE: 0.07 E9/L (ref 0–0.2)
BASOPHILS RELATIVE PERCENT: 1 % (ref 0–2)
BUN BLDV-MCNC: 29 MG/DL (ref 6–20)
CALCIUM SERPL-MCNC: 8 MG/DL (ref 8.6–10.2)
CHLORIDE BLD-SCNC: 101 MMOL/L (ref 98–107)
CO2: 32 MMOL/L (ref 22–29)
CREAT SERPL-MCNC: 5.6 MG/DL (ref 0.5–1)
EOSINOPHILS ABSOLUTE: 0.67 E9/L (ref 0.05–0.5)
EOSINOPHILS RELATIVE PERCENT: 9.9 % (ref 0–6)
GFR AFRICAN AMERICAN: 11
GFR NON-AFRICAN AMERICAN: 11 ML/MIN/1.73
GLUCOSE BLD-MCNC: 114 MG/DL (ref 74–99)
HCT VFR BLD CALC: 25 % (ref 34–48)
HEMOGLOBIN: 7.8 G/DL (ref 11.5–15.5)
IMMATURE GRANULOCYTES #: 0.02 E9/L
IMMATURE GRANULOCYTES %: 0.3 % (ref 0–5)
INR BLD: 0.9
LYMPHOCYTES ABSOLUTE: 2.18 E9/L (ref 1.5–4)
LYMPHOCYTES RELATIVE PERCENT: 32.2 % (ref 20–42)
MCH RBC QN AUTO: 30.8 PG (ref 26–35)
MCHC RBC AUTO-ENTMCNC: 31.2 % (ref 32–34.5)
MCV RBC AUTO: 98.8 FL (ref 80–99.9)
METER GLUCOSE: 101 MG/DL (ref 74–99)
METER GLUCOSE: 107 MG/DL (ref 74–99)
METER GLUCOSE: 111 MG/DL (ref 74–99)
METER GLUCOSE: 121 MG/DL (ref 74–99)
METER GLUCOSE: 134 MG/DL (ref 74–99)
METER GLUCOSE: 147 MG/DL (ref 74–99)
MONOCYTES ABSOLUTE: 0.39 E9/L (ref 0.1–0.95)
MONOCYTES RELATIVE PERCENT: 5.8 % (ref 2–12)
NEUTROPHILS ABSOLUTE: 3.45 E9/L (ref 1.8–7.3)
NEUTROPHILS RELATIVE PERCENT: 50.8 % (ref 43–80)
PDW BLD-RTO: 15.3 FL (ref 11.5–15)
PLATELET # BLD: 229 E9/L (ref 130–450)
PMV BLD AUTO: 11.1 FL (ref 7–12)
POTASSIUM SERPL-SCNC: 4.5 MMOL/L (ref 3.5–5)
PROTHROMBIN TIME: 10.1 SEC (ref 9.3–12.4)
RBC # BLD: 2.53 E12/L (ref 3.5–5.5)
REASON FOR REJECTION: NORMAL
REJECTED TEST: NORMAL
SODIUM BLD-SCNC: 141 MMOL/L (ref 132–146)
WBC # BLD: 6.8 E9/L (ref 4.5–11.5)

## 2021-09-15 PROCEDURE — 6370000000 HC RX 637 (ALT 250 FOR IP): Performed by: INTERNAL MEDICINE

## 2021-09-15 PROCEDURE — 85025 COMPLETE CBC W/AUTO DIFF WBC: CPT

## 2021-09-15 PROCEDURE — 36415 COLL VENOUS BLD VENIPUNCTURE: CPT

## 2021-09-15 PROCEDURE — 93970 EXTREMITY STUDY: CPT

## 2021-09-15 PROCEDURE — 7100000010 HC PHASE II RECOVERY - FIRST 15 MIN

## 2021-09-15 PROCEDURE — 7100000011 HC PHASE II RECOVERY - ADDTL 15 MIN

## 2021-09-15 PROCEDURE — 80048 BASIC METABOLIC PNL TOTAL CA: CPT

## 2021-09-15 PROCEDURE — 80074 ACUTE HEPATITIS PANEL: CPT

## 2021-09-15 PROCEDURE — 82962 GLUCOSE BLOOD TEST: CPT

## 2021-09-15 PROCEDURE — 2580000003 HC RX 258

## 2021-09-15 PROCEDURE — 85610 PROTHROMBIN TIME: CPT

## 2021-09-15 PROCEDURE — 02HV33Z INSERTION OF INFUSION DEVICE INTO SUPERIOR VENA CAVA, PERCUTANEOUS APPROACH: ICD-10-PCS | Performed by: RADIOLOGY

## 2021-09-15 PROCEDURE — 36558 INSERT TUNNELED CV CATH: CPT | Performed by: RADIOLOGY

## 2021-09-15 PROCEDURE — 6360000002 HC RX W HCPCS

## 2021-09-15 PROCEDURE — 99239 HOSP IP/OBS DSCHRG MGMT >30: CPT | Performed by: STUDENT IN AN ORGANIZED HEALTH CARE EDUCATION/TRAINING PROGRAM

## 2021-09-15 PROCEDURE — 6360000002 HC RX W HCPCS: Performed by: RADIOLOGY

## 2021-09-15 PROCEDURE — 77001 FLUOROGUIDE FOR VEIN DEVICE: CPT | Performed by: RADIOLOGY

## 2021-09-15 PROCEDURE — C1881 DIALYSIS ACCESS SYSTEM: HCPCS

## 2021-09-15 PROCEDURE — 0JH63XZ INSERTION OF TUNNELED VASCULAR ACCESS DEVICE INTO CHEST SUBCUTANEOUS TISSUE AND FASCIA, PERCUTANEOUS APPROACH: ICD-10-PCS | Performed by: RADIOLOGY

## 2021-09-15 PROCEDURE — 76937 US GUIDE VASCULAR ACCESS: CPT | Performed by: RADIOLOGY

## 2021-09-15 PROCEDURE — 6370000000 HC RX 637 (ALT 250 FOR IP)

## 2021-09-15 RX ORDER — INSULIN GLARGINE 100 [IU]/ML
10 INJECTION, SOLUTION SUBCUTANEOUS 2 TIMES DAILY
Qty: 10 ML | Refills: 3 | Status: ON HOLD | OUTPATIENT
Start: 2021-09-15 | End: 2021-11-15 | Stop reason: HOSPADM

## 2021-09-15 RX ORDER — FENTANYL CITRATE 50 UG/ML
INJECTION, SOLUTION INTRAMUSCULAR; INTRAVENOUS
Status: COMPLETED | OUTPATIENT
Start: 2021-09-15 | End: 2021-09-15

## 2021-09-15 RX ORDER — GENTAMICIN SULFATE 1 MG/G
CREAM TOPICAL
Qty: 15 G | Refills: 0 | Status: ON HOLD | OUTPATIENT
Start: 2021-09-15 | End: 2021-11-14 | Stop reason: HOSPADM

## 2021-09-15 RX ORDER — MIDAZOLAM HYDROCHLORIDE 1 MG/ML
INJECTION INTRAMUSCULAR; INTRAVENOUS
Status: COMPLETED | OUTPATIENT
Start: 2021-09-15 | End: 2021-09-15

## 2021-09-15 RX ADMIN — INSULIN LISPRO 1 UNITS: 100 INJECTION, SOLUTION INTRAVENOUS; SUBCUTANEOUS at 16:48

## 2021-09-15 RX ADMIN — FENTANYL CITRATE 50 MCG: 50 INJECTION, SOLUTION INTRAMUSCULAR; INTRAVENOUS at 12:50

## 2021-09-15 RX ADMIN — Medication 10 ML: at 09:30

## 2021-09-15 RX ADMIN — HEPARIN SODIUM 5000 UNITS: 5000 INJECTION INTRAVENOUS; SUBCUTANEOUS at 14:29

## 2021-09-15 RX ADMIN — MIDAZOLAM 1 MG: 1 INJECTION INTRAMUSCULAR; INTRAVENOUS at 12:50

## 2021-09-15 RX ADMIN — SEVELAMER CARBONATE 800 MG: 800 TABLET, FILM COATED ORAL at 17:17

## 2021-09-15 ASSESSMENT — PAIN SCALES - GENERAL: PAINLEVEL_OUTOF10: 0

## 2021-09-15 NOTE — PROGRESS NOTES
Department of Internal Medicine  Nephrology Progress  Note    Events reviewed. SUBJECTIVE:  We are following MsRolando Flores for ESRD on PD and hyperkalemia. Reports no complaints.     PHYSICAL EXAM:      Vitals:    VITALS:  BP (!) 160/95   Pulse 102   Temp 98.3 °F (36.8 °C) (Oral)   Resp 18   Ht 5' 4\" (1.626 m)   Wt 170 lb 13.7 oz (77.5 kg)   SpO2 93%   BMI 29.33 kg/m²   24HR INTAKE/OUTPUT:      Intake/Output Summary (Last 24 hours) at 9/15/2021 1026  Last data filed at 9/15/2021 1408  Gross per 24 hour   Intake 340 ml   Output 2235 ml   Net -1895 ml     Access: Left femoral temporary dialysis catheter in place  PD Catheter Exam:  PD catheter exit site clean    Constitutional: Awake in no acute distress  HEENT:  Normocephalic, PERRL  Respiratory: Decreased breath sounds on the basis  Cardiovascular/Edema:  RRR, S1/S2  Gastrointestinal:  Soft, PD catheter exit site clean   Neurologic:  Nonfocal, AVELAR  Skin:  Warm, dry, no lesions  Other:  left foot cyanotic    Scheduled Meds:   gentamicin   Topical Daily    sodium chloride flush  5-40 mL IntraVENous 2 times per day    heparin (porcine)  5,000 Units SubCUTAneous 3 times per day    pantoprazole  40 mg Oral QAM AC    folic acid  1 mg Oral Daily    levothyroxine  75 mcg Oral Daily    metoprolol tartrate  25 mg Oral BID    sevelamer  800 mg Oral TID WC    mirtazapine  15 mg Oral Nightly    ARIPiprazole  5 mg Oral Nightly    insulin glargine  10 Units SubCUTAneous BID    insulin lispro  0-6 Units SubCUTAneous TID WC    insulin lispro  0-3 Units SubCUTAneous Nightly     Continuous Infusions:   sodium chloride       PRN Meds:.oxyCODONE-acetaminophen, loperamide, dextrose, sodium chloride flush, sodium chloride, ondansetron **OR** ondansetron, polyethylene glycol, acetaminophen **OR** acetaminophen, hydrALAZINE, dextrose    DATA:    CBC:   Lab Results   Component Value Date    WBC 6.8 09/15/2021    RBC 2.53 09/15/2021    HGB 7.8 09/15/2021 HCT 25.0 09/15/2021    MCV 98.8 09/15/2021    MCH 30.8 09/15/2021    MCHC 31.2 09/15/2021    RDW 15.3 09/15/2021     09/15/2021    MPV 11.1 09/15/2021     CMP:    Lab Results   Component Value Date     09/15/2021    K 4.5 09/15/2021    K 4.0 08/26/2021     09/15/2021    CO2 32 09/15/2021    BUN 29 09/15/2021    CREATININE 5.6 09/15/2021    GFRAA 11 09/15/2021    LABGLOM 11 09/15/2021    GLUCOSE 114 09/15/2021    GLUCOSE 130 05/18/2012    PROT 5.7 09/07/2021    LABALBU 2.5 09/07/2021    LABALBU 4.1 05/18/2012    CALCIUM 8.0 09/15/2021    BILITOT <0.2 09/07/2021    ALKPHOS 251 09/07/2021    AST 80 09/07/2021    ALT 41 09/07/2021     Magnesium:    Lab Results   Component Value Date    MG 1.8 09/14/2021     Phosphorus:    Lab Results   Component Value Date    PHOS 5.9 09/14/2021     Radiology Review:         CXR 9/08/2021   No acute cardiopulmonary process.                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 Upon arrival to ER her blood glucose was found to be 1087 and her potassium 7. Patient was started on insulin infusion and emergent PD initiated in ER. We are consulted for hyperkalemia and ESRD on PD. Problems resolved:    · DKA, initial blood glucose 1087, improved and no longer on insulin drip  · Abdominal pain, PD cell count 13, neutrophil count 23. IMPRESSION/RECOMMENDATIONS:     1. ESRD on PD, CCPD, 12 L total per day, 5 exchanges of 2 L of 1.5% at night with a long dwell of 2 L of 2.5%. Patient to switch to home hemodialysis. She is to have tunneled dialysis catheter placement tomorrow. 2. Hyperkalemia, potassium levels improved after HD, to continue PD and Lokelma 10 g daily  3. HTN, on carvedilol  4. MBD of CKD, on sevelamer 800 mg tid  5. Anemia of CKD, on epoetin alpha at home  -------------------------------------------------------------------  5. History of gastroparesis, on metoclopramide  6. PVD? left foot ischemia, no plans for intervention from podiatry. X-ray demonstrates broken toes left foot 2-5. Plan:    · Continue CCPD, 5 exchanges of 2 L of 1.5% at night with a long dwell of 2 L of 2.5%. Total volume 2 liters.    · For tunneled dialysis catheter placement today  · Continue to monitor potassium levels daily  · Continue Lokelma 10 g p.o. daily  · Continue low potassium diet  · Continue Sevelamer 800 mg tid  · Okay to discharge from renal point of view after dialysis catheter placed  · Home HD training in [place] planned    Electronically signed by Ramo Carroll MD on 9/15/2021 at 10:26 AM

## 2021-09-15 NOTE — PROGRESS NOTES
Patient sugar checked this evening, reading 56. Patient alert and oriented tolerated oral intake. States she does not like glucose gel and asked for jucie/snack instead.  Repeat glucose- 85

## 2021-09-15 NOTE — PROGRESS NOTES
Patient came down to special procedures for tunneled dialysis catheter placement. 1250 Procedure start /115 95 10 100% on 2 liters nasal canula      1305 Procedure end 138/109 92 11 98% on 2 liters nasal canula. Tunneled dialysis catheter to right IJ/chest.  2 x 2 tegaderm applied to right IJ and 2 x 2 tegaderm applied to chest.     nurse to nurse called, spoke with Luba Villagomez RN    Patient transported back to the floor.

## 2021-09-15 NOTE — PROGRESS NOTES
Physical Therapy    0420/0420-01    Patient unavailable for physical therapy treatment due to out of room for testing. Lucio Pablo  \[facility] [place] [facility]\""  LIC # [id number]

## 2021-09-15 NOTE — PROGRESS NOTES
Physician Progress Note      Sandra Andujar  CSN #:                  [id number]  :                       1990  ADMIT DATE:       2021 10:29 AM  [address] DATE:        2021 6:50 PM  RESPONDING  PROVIDER #:        Brie Glover MD          QUERY TEXT:    Patient admitted with atypical chest pain. Documentation reflects Acute on   chronic systolic CHF in H&P. If possible, please document in the progress   notes and discharge summary if CHF was: The medical record reflects the following:  Risk Factors: Atypical CP, Hx of CHF per EMR, DM type I, ESRD  Clinical Indicators: CXR: no acute process. CTA chest: Gravity dependent   changes and mild haziness at the posterior lung bases. Otherwise no acute   process. Large ascites in the abdomen. CTA pulm: 1. No PE 2. Nonspecific   dependent pulmonary opacities in the lower lobes, increased as of the CT of   the chest from approximately 3 hours earlier. ..may be atelectasis or   pneumonia. 3. Prominent ascites in the upper abdomen. ECHO 2021 &   5/15/21:  EF 60-65%. Probnp > 70,000. Treatment: IVF bolus/ ASA in ER. Chronic PD, In patient meds: Morphine,   Apresoline, Coreg, NTG SL x2, NTG paste, Heparin. Thank you, Savannah Cárdenas RN [phone]  Options provided:  -- Acute on chronic systolic CHF confirmed after study  -- Acute on chronic systolic CHF ruled out after study  -- Other - I will add my own diagnosis  -- Disagree - Not applicable / Not valid  -- Disagree - Clinically unable to determine / Unknown  -- Refer to Clinical Documentation Reviewer    PROVIDER RESPONSE TEXT:    Provider is clinically unable to determine a response to this query. Query created by: Gabriel Britton on 2021 8:44 AM      QUERY TEXT:    Pt admitted with atypical chest pain with nausea. Pt noted to have   documentation of \". ..risk factors for premature CAD ie Type I DM and ESRD. \" in    PN  If possible, please document in progress notes and discharge summary   if you  evaluated/treated any of the following: The medical record reflects the following:  Risk Factors: Atypical CP,  DM type I with gastroparesis, ESRD, GERD. Clinical Indicators: CXR: no acute process. CTA chest:  no acute process. Large ascites in the abdomen. CTA pulm: 1. No PE 2. Nonspecific dependent   pulmonary opacities in the lower lobes . ..may be atelectasis or pneumonia. 3.   Prominent ascites  upper abdomen. ECHO 2/22/2021 & 5/15/21:  EF 60-65%. HS   Trop: 200, 202. Treatment: IVF bolus/ ASA in ER. Chronic PD, In patient meds: Morphine,   Reglan, Zofran, Protonix, Morphine, Apresoline, Coreg, NTG SL x2, NTG paste,   Heparin, Insulin coverage. Thank you, Fatmata Fine RN [phone]  Options provided:  -- Chest pain due to GERD  -- Chest pain due to diabetic gastroparesis  -- Chest pain due to CAD with unstable angina  -- Other - I will add my own diagnosis  -- Disagree - Not applicable / Not valid  -- Disagree - Clinically unable to determine / Unknown  -- Refer to Clinical Documentation Reviewer    PROVIDER RESPONSE TEXT:    Provider is clinically unable to determine a response to this query.     Query created by: Karissa Roe on 8/31/2021 3:03 PM      Electronically signed by:  Janie Yancey MD 9/15/2021 1:33 PM

## 2021-09-15 NOTE — PROGRESS NOTES
Comprehensive Nutrition Assessment    Type and Reason for Visit:  Initial    Nutrition Recommendations/Plan: Continue NPO, will monitor for nutrition progression    Nutrition Assessment:  Pt admit for hyperglycemia, hyperkalemia, body aches & nausea. H/o noncompliance, ESRD on PD, T1DM, HTN, & CHF. Pt NPO for tunneled dialysis catheter placement. Will continue to monitor. Malnutrition Assessment:  Malnutrition Status:  Insufficient data    Context:  Chronic Illness     Findings of the 6 clinical characteristics of malnutrition:  Energy Intake:  Unable to assess (D/t NPO)  Weight Loss:  Unable to assess (D/t likely fluid shifts 2/2 CHF, PD)     Body Fat Loss:  No significant body fat loss     Muscle Mass Loss:  No significant muscle mass loss    Fluid Accumulation:  No significant fluid accumulation     Strength:  Not Performed    Estimated Daily Nutrient Needs:  Energy (kcal):  7844-2305 (MSJ x 1.2 SF); Weight Used for Energy Requirements:  Current     Protein (g):  65-80 (1.2-1.4g/kg IBW); Weight Used for Protein Requirements:  Ideal        Fluid (ml/day):  Per renal; Method Used for Fluid Requirements:         Nutrition Related Findings:  A&Ox4, +BS, diarrhea, trace edema, hyperglycemia      Wounds:  None       Current Nutrition Therapies:    Diet NPO    Anthropometric Measures:  · Height: 5' 4\" (162.6 cm)  · Current Body Weight: 170 lb (77.1 kg) (9/14, no method)   · Admission Body Weight: 147 lb (66.7 kg) (9/7, stated)    · Usual Body Weight: 153 lb (69.4 kg) (8/16/21, actual)     · Ideal Body Weight: 120 lbs; % Ideal Body Weight 141.7 %   · BMI: 29.2   · BMI Categories: Overweight (BMI 25.0-29. 9)       Nutrition Diagnosis:   · Inadequate oral intake related to renal dysfunction as evidenced by NPO or clear liquid status due to medical condition    Nutrition Interventions:   Food and/or Nutrient Delivery:  Continue NPO  Nutrition Education/Counseling:  Education not indicated   Coordination of Nutrition Care:  Continue to monitor while inpatient    Goals:  Nutrition Progression       Nutrition Monitoring and Evaluation:   Behavioral-Environmental Outcomes:  None Identified   Food/Nutrient Intake Outcomes:  Diet Advancement/Tolerance  Physical Signs/Symptoms Outcomes:  Biochemical Data, Diarrhea, Fluid Status or Edema, Nutrition Focused Physical Findings, Skin, Weight     Discharge Planning:     Too soon to determine     Electronically signed by Lloyd Taveras RD, LD on 9/15/21 at 9:50 AM EDT    Contact: [phone]

## 2021-09-15 NOTE — DISCHARGE SUMMARY
[facility] Physician Discharge Summary       No follow-up provider specified. Activity level: as tolerated    Diet: ADULT DIET; Regular; 4 carb choices (60 gm/meal); Low Potassium (Less than 3000 mg/day)    Labs:none    Condition at discharge: stable    Dispo:home      Patient ID:  Aster Hyde  [id number]  37 y.o.  1990    Admit date: 9/7/2021    Discharge date and time:  9/15/2021  6:43 PM    Admission Diagnoses: Active Problems:    Uncontrolled type 1 diabetes mellitus with hyperglycemia, with long-term current use of insulin ([facility])    End stage renal disease (HCC)    Hyperkalemia  Resolved Problems:    * No resolved hospital problems. *      Discharge Diagnoses: Active Problems:    Uncontrolled type 1 diabetes mellitus with hyperglycemia, with long-term current use of insulin (HCC)    End stage renal disease (HCC)    Hyperkalemia  Resolved Problems:    * No resolved hospital problems. *      Consults:  IP CONSULT TO NEPHROLOGY  IP CONSULT TO CRITICAL CARE  IP CONSULT TO ENDOCRINOLOGY  IP CONSULT TO PODIATRY  IP CONSULT TO GENERAL SURGERY  IP CONSULT TO VASCULAR SURGERY    Procedures: R IJ CVC placement, hemodialysis (HD), peritoneal dialysis (PD). Hospital Course: Patient is a 79-year-old female with past medical history significant for poorly controlled diabetes, multiple admissions for DKA, end-stage renal disease on peritoneal dialysis, cardiac arrest, CHF, gastroparesis, depression, hypothyroidism, hyperlipidemia, iron deficiency anemia. Presented to the ED complaining of cramping and myalgias that started last night. And then developed nausea vomiting. In the ED she was found to have a blood sugar of 1087, she states she is taking her insulin as scheduled but did not answer what her home blood sugar checks were. Also found to have a potassium of 7, she states she has been taking her dialysis regularly.   Nephrology was contacted in ED and she underwent emergent dialysis. Started on insulin drip for HHS and referred for admission. Denies any fever chills, no URTI symptoms, no burning micturition. When seen she was drowsy but readily arousable, states she has not slept all night. Patient was found to have hyperkalemia and have ESRD with need for urgent HD during admission. Her hyperkalemia improved after a session of HD and then continued PD. Patient was set up for HD after discharge, had a RIJ CVC placed and patient will be trained on how to do HD at home. Patient is also a diabetic and was found to be in DKA during admission. Was treated with insulin drip and did resolve. Was started on lantus 10 units BID after insulin drip stopped. Patient had left foot pain and was found to have fractures of 2nd-4th proximal phalanges and was seen by podiatry and fit in a surgical shoe. On day of discharge, she was cleared to go home by podiatry and nephrology. She was in stable condition and was sent home.  She should follow up with her PCP and her nephrologist as well as her podiatrist.     Discharge Exam:  Vitals:    09/15/21 0900 09/15/21 0915 09/15/21 0937 09/15/21 1300   BP: (!) 160/95 (!) 160/95  (!) 140/80   Pulse: 102 102  95   Resp: 18 18  18   Temp: 98.3 °F (36.8 °C) 98.3 °F (36.8 °C)  98.6 °F (37 °C)   TempSrc:  Oral  Oral   SpO2: 93% 93%     Weight: 169 lb 1.5 oz (76.7 kg)      Height:   5' 4\" (1.626 m)        General Appearance: alert and in no acute distress  Skin: warm and dry  Head: normocephalic and atraumatic  Eyes: pupils equal, round, and reactive to light, extraocular eye movements intact, conjunctivae normal  Neck: neck supple and non tender without mass   Pulmonary/Chest: clear to auscultation bilaterally- no wheezes, rales or rhonchi, normal air movement, no respiratory distress  Cardiovascular: normal rate, normal S1 and S2 and no carotid bruits  Abdomen: soft, mild diffuse, non-distended, normal bowel sounds, no masses or organomegaly  Extremities: left foot in surgical shoe. Neurologic: no cranial nerve deficit and speech normal  I/O last 3 completed shifts: In: 0   Out: 3479   No intake/output data recorded. LABS:  Recent Labs     09/13/21  1837 09/14/21  0820 09/15/21  0630   * 141 141   K 5.0 4.3 4.5   * 103 101   CO2 25 31* 32*   BUN 30* 30* 29*   CREATININE 5.5* 5.1* 5.6*   GLUCOSE 66* 55* 114*   CALCIUM 8.1* 8.1* 8.0*       Recent Labs     09/13/21  0400 09/14/21  0820 09/15/21  0630   WBC 6.8 6.6 6.8   RBC 2.57* 2.82* 2.53*   HGB 8.0* 8.6* 7.8*   HCT 25.0* 27.7* 25.0*   MCV 97.3 98.2 98.8   MCH 31.1 30.5 30.8   MCHC 32.0 31.0* 31.2*   RDW 15.0 14.9 15.3*    219 229   MPV 11.1 10.7 11.1       No results for input(s): POCGLU in the last 72 hours. Imaging:  XR CHEST PORTABLE    Result Date: 9/8/2021  EXAMINATION: ONE XRAY VIEW OF THE CHEST 9/8/2021 1:18 am COMPARISON: August 25, 2021. HISTORY: ORDERING SYSTEM PROVIDED HISTORY: SOB, normal lung exam TECHNOLOGIST PROVIDED HISTORY: Reason for exam:->SOB, normal lung exam FINDINGS: Frontal portable view of the chest.  Normal lung volume. No focal airspace disease. Normal pulmonary vasculature. No pleural effusion or pneumothorax. Stable cardiomediastinal silhouette and great vessels. Multiple old bilateral rib fracture deformities. .     No acute cardiopulmonary process. Patient Instructions:   Current Discharge Medication List      START taking these medications    Details   gentamicin (GARAMYCIN) 0.1 % cream Apply topically 3 times daily.   Qty: 15 g, Refills: 0         CONTINUE these medications which have CHANGED    Details   insulin glargine (LANTUS) 100 UNIT/ML injection vial Inject 10 Units into the skin 2 times daily  Qty: 10 mL, Refills: 3         CONTINUE these medications which have NOT CHANGED    Details   vitamin D (ERGOCALCIFEROL) 1.25 MG (43015 UT) CAPS capsule Take 1 capsule by mouth once a week  Qty: 5 capsule, Refills: 0 metoprolol tartrate (LOPRESSOR) 25 MG tablet Take 25 mg by mouth 2 times daily      bumetanide (BUMEX) 1 MG tablet Take 2 mg by mouth 2 times daily      sevelamer (RENVELA) 800 MG tablet Take 1 tablet by mouth 3 times daily (with meals) New lower dose  Qty: 90 tablet, Refills: 3      insulin lispro, 1 Unit Dial, (HUMALOG KWIKPEN) 100 UNIT/ML SOPN Inject 3 units with meals + sliding scale. MAX 30U/day  Qty: 10 pen, Refills: 3    Associated Diagnoses: Type 1 diabetes mellitus with other specified complication (HCC)      Insulin Pen Needle (BD PEN NEEDLE NAV U/F) 32G X 4 MM MISC Uses with insulin 4 times a day  Qty: 250 each, Refills: 5    Associated Diagnoses: Type 1 diabetes mellitus with other specified complication ([facility] [address]      ! ! blood glucose monitor strips Freestyle Lite Strips. Checks 4 times/day before meals and at bedtime and as needed for symptoms of irregular blood glucose.   Qty: 250 strip, Refills: 5    Associated Diagnoses: Type 1 diabetes mellitus with other specified complication (HCC)      mirtazapine (REMERON) 15 MG tablet Take 15 mg by mouth nightly      hydrOXYzine (ATARAX) 25 MG tablet Take 25 mg by mouth daily      polyethylene glycol (GLYCOLAX) 17 g packet Take 17 g by mouth daily as needed for Constipation      ARIPiprazole (ABILIFY) 5 MG tablet Take 5 mg by mouth nightly      Glucagon, rDNA, (GLUCAGON EMERGENCY IJ) Inject as directed      glucose (GLUTOSE) 40 % GEL Take 15 g by mouth See Admin Instructions      pantoprazole (PROTONIX) 40 MG tablet Take 1 tablet by mouth every morning (before breakfast)  Qty: 30 tablet, Refills: 3      levothyroxine (SYNTHROID) 75 MCG tablet TAKE 1 TABLET BY MOUTH IN THE MORNING BEFORE BREAKFAST  Qty: 60 tablet, Refills: 0      epoetin nena-epbx (RETACRIT) 3000 UNIT/ML SOLN injection Inject 1 mL into the skin three times a week  Qty: 21.9 mL      rOPINIRole (REQUIP) 0.25 MG tablet Take 0.25 mg by mouth nightly      !! blood glucose test strips (CONTOUR NEXT TEST) strip 1 each by In Vitro route 5 times daily As needed. Qty: 150 each, Refills: 5    Associated Diagnoses: Type 1 diabetes mellitus with other specified complication ([facility] [address] Insulin pump in place      metoclopramide (REGLAN) 5 MG tablet Take 5 mg by mouth 3 times daily       pregabalin (LYRICA) 25 MG capsule Take 1 capsule by mouth daily for 30 days. Qty: 30 capsule, Refills: 0    Associated Diagnoses: Chronic neuropathic pain      folic acid (FOLVITE) 1 MG tablet Take 1 tablet by mouth daily  Qty: 30 tablet, Refills: 3       !! - Potential duplicate medications found. Please discuss with provider. Note that more than 30 minutes was spent in preparing discharge papers, discussing discharge with patient, medication review, etc.    NOTE: This report was transcribed using voice recognition software. Every effort was made to ensure accuracy; however, inadvertent computerized transcription errors may be present.      Signed:  Electronically signed by Fran Bhakta MD on 9/15/2021 at 6:43 PM

## 2021-09-15 NOTE — CARE COORDINATION
9/15/2021 0920 CM note: Negative covid test 9/7/21. Pt for Tunnelled HD catheter placement today. Pt was performing self PD at home and received supplies from [employer]. Per Loreta Wright at [facility]([phone]), plan is for pt/mother to start home HD training next Wed 9/22/21 at [address] [place] [address] [facility]. Loreta Wright reports pt will continue peritoneal dialysis in the interim. Pt informed of above. Per Kiersten's request, clinical information faxed to [phone]. Plan is for pt to return home, her mother will provide ride.  Mia ALVAREZ

## 2021-09-15 NOTE — FLOWSHEET NOTE
ccpd completed without coplications. Aseptic technique used. Dressing due to be changed tonight. Effluent drained clear yellow. 1244 ml net removed    vss post treatment        09/15/21 0900   Vitals   BP (!) 160/95   Temp 98.3 °F (36.8 °C)   Pulse 102   Resp 18   SpO2 93 %   Weight 169 lb 1.5 oz (76.7 kg)   Peritoneal Dialysis Catheter Left lower abdomen   Placement Date/Time: 10/31/20 0704   Catheter Location: Left lower abdomen   Status Deaccessed   Site Condition No Complications   Dressing Status Clean;Dry; Intact   Dressing Gauze   Cycler   Ultrafiltration (UF) (mL) 1244 mL

## 2021-09-17 ENCOUNTER — TELEPHONE (OUTPATIENT)
Dept: INTERNAL MEDICINE | Age: 31
End: 2021-09-17

## 2021-09-17 NOTE — TELEPHONE ENCOUNTER
[facility] Transitions Initial Follow Up Call    Outreach made within 2 business days of discharge: Yes    Patient: Neeraj Figueroa Patient : 1990   MRN: [id number]  Reason for Admission: Hyperkalemia   Discharge Date: 9/15/21       Spoke with: Patient's mother. Patient was sleeping     Discharge department/facility: [address] [place] [address]     TCM Interactive Patient Contact:  Was patient able to fill all prescriptions: Mother states there were no medications to fill. Does patient understand their discharge instructions: Yes  Does patient have questions or concerns that need addressed prior to 7-14 day follow up office visit: no    Mother stated that patient will call the internal medicine clinic to schedule an appointment if she feels it is needed. Encouraged her to follow up and offered to schedule the appointment many times. She did state that she would need to change the future appt with Dr. Gabriel Horne due to going out of town and asked for their phone number.       Scheduled appointment with PCP within 7-14 days    Follow Up  Future Appointments   Date Time Provider Emiliana De Los Santos   10/13/2021 10:00 AM Fortino Wolf MD [facility]       Stephanie Rodriguez

## 2021-09-20 LAB
HAV IGM SER IA-ACNC: NORMAL
HEPATITIS B CORE IGM ANTIBODY: NORMAL
HEPATITIS B SURFACE ANTIGEN INTERPRETATION: NORMAL
HEPATITIS C ANTIBODY INTERPRETATION: NORMAL

## 2021-09-28 ENCOUNTER — APPOINTMENT (OUTPATIENT)
Dept: CT IMAGING | Age: 31
DRG: 711 | End: 2021-09-28
Payer: COMMERCIAL

## 2021-09-28 ENCOUNTER — APPOINTMENT (OUTPATIENT)
Dept: GENERAL RADIOLOGY | Age: 31
DRG: 711 | End: 2021-09-28
Payer: COMMERCIAL

## 2021-09-28 ENCOUNTER — HOSPITAL ENCOUNTER (INPATIENT)
Age: 31
LOS: 5 days | Discharge: HOME | DRG: 711 | End: 2021-10-03
Attending: EMERGENCY MEDICINE | Admitting: STUDENT IN AN ORGANIZED HEALTH CARE EDUCATION/TRAINING PROGRAM
Payer: COMMERCIAL

## 2021-09-28 DIAGNOSIS — N18.4 CHRONIC RENAL FAILURE SYNDROME, STAGE 4 (SEVERE) (HCC): ICD-10-CM

## 2021-09-28 DIAGNOSIS — A41.9 SEPSIS WITH ACUTE ORGAN DYSFUNCTION, DUE TO UNSPECIFIED ORGANISM, UNSPECIFIED TYPE, UNSPECIFIED WHETHER SEPTIC SHOCK PRESENT (HCC): Primary | ICD-10-CM

## 2021-09-28 DIAGNOSIS — B95.7 BACTEREMIA DUE TO COAGULASE-NEGATIVE STAPHYLOCOCCUS: ICD-10-CM

## 2021-09-28 DIAGNOSIS — R78.81 BACTEREMIA DUE TO COAGULASE-NEGATIVE STAPHYLOCOCCUS: ICD-10-CM

## 2021-09-28 DIAGNOSIS — R65.20 SEPSIS WITH ACUTE ORGAN DYSFUNCTION, DUE TO UNSPECIFIED ORGANISM, UNSPECIFIED TYPE, UNSPECIFIED WHETHER SEPTIC SHOCK PRESENT (HCC): Primary | ICD-10-CM

## 2021-09-28 PROBLEM — N18.6 ESRD (END STAGE RENAL DISEASE) (HCC): Status: ACTIVE | Noted: 2021-09-28

## 2021-09-28 LAB
ACETAMINOPHEN LEVEL: <5 MCG/ML (ref 10–30)
ADENOVIRUS BY PCR: NOT DETECTED
ALBUMIN SERPL-MCNC: 2 G/DL (ref 3.5–5.2)
ALP BLD-CCNC: 213 U/L (ref 35–104)
ALT SERPL-CCNC: 11 U/L (ref 0–32)
AMPHETAMINE SCREEN, URINE: NOT DETECTED
ANION GAP SERPL CALCULATED.3IONS-SCNC: 6 MMOL/L (ref 7–16)
APTT: 29.3 SEC (ref 24.5–35.1)
AST SERPL-CCNC: 13 U/L (ref 0–31)
B.E.: 9 MMOL/L (ref -3–3)
BACTERIA: ABNORMAL /HPF
BARBITURATE SCREEN URINE: NOT DETECTED
BASOPHILS ABSOLUTE: 0.05 E9/L (ref 0–0.2)
BASOPHILS RELATIVE PERCENT: 0.3 % (ref 0–2)
BENZODIAZEPINE SCREEN, URINE: NOT DETECTED
BETA-HYDROXYBUTYRATE: 0.6 MMOL/L (ref 0.02–0.27)
BILIRUB SERPL-MCNC: <0.2 MG/DL (ref 0–1.2)
BILIRUBIN URINE: ABNORMAL
BLOOD, URINE: ABNORMAL
BORDETELLA PARAPERTUSSIS BY PCR: NOT DETECTED
BORDETELLA PERTUSSIS BY PCR: NOT DETECTED
BUN BLDV-MCNC: 14 MG/DL (ref 6–20)
CALCIUM SERPL-MCNC: 7.7 MG/DL (ref 8.6–10.2)
CANNABINOID SCREEN URINE: NOT DETECTED
CHLAMYDOPHILIA PNEUMONIAE BY PCR: NOT DETECTED
CHLORIDE BLD-SCNC: 93 MMOL/L (ref 98–107)
CLARITY: CLEAR
CO2: 34 MMOL/L (ref 22–29)
COCAINE METABOLITE SCREEN URINE: NOT DETECTED
COHB: 2.5 % (ref 0–1.5)
COLOR: YELLOW
COMMENT: ABNORMAL
CORONAVIRUS 229E BY PCR: NOT DETECTED
CORONAVIRUS HKU1 BY PCR: NOT DETECTED
CORONAVIRUS NL63 BY PCR: NOT DETECTED
CORONAVIRUS OC43 BY PCR: NOT DETECTED
CREAT SERPL-MCNC: 5.1 MG/DL (ref 0.5–1)
CRITICAL: ABNORMAL
DATE ANALYZED: ABNORMAL
DATE OF COLLECTION: ABNORMAL
EKG ATRIAL RATE: 122 BPM
EKG P AXIS: 34 DEGREES
EKG P-R INTERVAL: 118 MS
EKG Q-T INTERVAL: 332 MS
EKG QRS DURATION: 84 MS
EKG QTC CALCULATION (BAZETT): 473 MS
EKG R AXIS: 25 DEGREES
EKG T AXIS: 29 DEGREES
EKG VENTRICULAR RATE: 122 BPM
EOSINOPHILS ABSOLUTE: 0.38 E9/L (ref 0.05–0.5)
EOSINOPHILS RELATIVE PERCENT: 2.5 % (ref 0–6)
EPITHELIAL CELLS, UA: ABNORMAL /HPF
ETHANOL: <10 MG/DL (ref 0–0.08)
FENTANYL SCREEN, URINE: NOT DETECTED
FIO2: 21 %
GFR AFRICAN AMERICAN: 12
GFR NON-AFRICAN AMERICAN: 12 ML/MIN/1.73
GLUCOSE BLD-MCNC: 228 MG/DL (ref 74–99)
GLUCOSE URINE: 500 MG/DL
HBA1C MFR BLD: 10.6 % (ref 4–5.6)
HCG QUALITATIVE: NEGATIVE
HCO3: 33.6 MMOL/L (ref 22–26)
HCT VFR BLD CALC: 25.6 % (ref 34–48)
HEMOGLOBIN: 8.3 G/DL (ref 11.5–15.5)
HHB: 21.8 % (ref 0–5)
HUMAN METAPNEUMOVIRUS BY PCR: NOT DETECTED
HUMAN RHINOVIRUS/ENTEROVIRUS BY PCR: DETECTED
IMMATURE GRANULOCYTES #: 0.13 E9/L
IMMATURE GRANULOCYTES %: 0.8 % (ref 0–5)
INFLUENZA A BY PCR: NOT DETECTED
INFLUENZA B BY PCR: NOT DETECTED
INR BLD: 1.1
KETONES, URINE: NEGATIVE MG/DL
LAB: ABNORMAL
LACTIC ACID, SEPSIS: 0.6 MMOL/L (ref 0.5–1.9)
LACTIC ACID, SEPSIS: 1.4 MMOL/L (ref 0.5–1.9)
LEUKOCYTE ESTERASE, URINE: NEGATIVE
LYMPHOCYTES ABSOLUTE: 1.07 E9/L (ref 1.5–4)
LYMPHOCYTES RELATIVE PERCENT: 7 % (ref 20–42)
Lab: ABNORMAL
Lab: ABNORMAL
MAGNESIUM: 1.7 MG/DL (ref 1.6–2.6)
MCH RBC QN AUTO: 30.4 PG (ref 26–35)
MCHC RBC AUTO-ENTMCNC: 32.4 % (ref 32–34.5)
MCV RBC AUTO: 93.8 FL (ref 80–99.9)
METER GLUCOSE: 282 MG/DL (ref 74–99)
METER GLUCOSE: 89 MG/DL (ref 74–99)
METHADONE SCREEN, URINE: NOT DETECTED
METHB: 0.3 % (ref 0–1.5)
MODE: ABNORMAL
MONOCYTES ABSOLUTE: 0.46 E9/L (ref 0.1–0.95)
MONOCYTES RELATIVE PERCENT: 3 % (ref 2–12)
MYCOPLASMA PNEUMONIAE BY PCR: NOT DETECTED
NEUTROPHILS ABSOLUTE: 13.25 E9/L (ref 1.8–7.3)
NEUTROPHILS RELATIVE PERCENT: 86.4 % (ref 43–80)
NITRITE, URINE: NEGATIVE
O2 CONTENT: 10 ML/DL
O2 SATURATION: 77.6 % (ref 92–98.5)
O2HB: 75.4 % (ref 94–97)
OPERATOR ID: 2658
OPIATE SCREEN URINE: NOT DETECTED
OXYCODONE URINE: POSITIVE
PARAINFLUENZA VIRUS 1 BY PCR: NOT DETECTED
PARAINFLUENZA VIRUS 2 BY PCR: NOT DETECTED
PARAINFLUENZA VIRUS 3 BY PCR: NOT DETECTED
PARAINFLUENZA VIRUS 4 BY PCR: NOT DETECTED
PATIENT TEMP: 37
PCO2: 46.6 MMHG (ref 35–45)
PDW BLD-RTO: 14.9 FL (ref 11.5–15)
PFO2: 1.92 MMHG/%
PH BLOOD GAS: 7.48 (ref 7.35–7.45)
PH UA: 8 (ref 5–9)
PHENCYCLIDINE SCREEN URINE: NOT DETECTED
PLATELET # BLD: 217 E9/L (ref 130–450)
PMV BLD AUTO: 10.7 FL (ref 7–12)
PO2: 40.3 MMHG (ref 75–100)
POTASSIUM SERPL-SCNC: 4.1 MMOL/L (ref 3.5–5)
PROCALCITONIN: 1.33 NG/ML (ref 0–0.08)
PROTEIN UA: >=300 MG/DL
PROTHROMBIN TIME: 12.4 SEC (ref 9.3–12.4)
RBC # BLD: 2.73 E12/L (ref 3.5–5.5)
RBC UA: ABNORMAL /HPF (ref 0–2)
RESPIRATORY SYNCYTIAL VIRUS BY PCR: NOT DETECTED
SALICYLATE, SERUM: <0.3 MG/DL (ref 0–30)
SARS-COV-2, NAAT: NOT DETECTED
SARS-COV-2, PCR: NOT DETECTED
SODIUM BLD-SCNC: 133 MMOL/L (ref 132–146)
SOURCE, BLOOD GAS: ABNORMAL
SPECIFIC GRAVITY UA: 1.02 (ref 1–1.03)
THB: 9.4 G/DL (ref 11.5–16.5)
TIME ANALYZED: 949
TOTAL PROTEIN: 4.9 G/DL (ref 6.4–8.3)
TROPONIN, HIGH SENSITIVITY: 174 NG/L (ref 0–9)
TROPONIN, HIGH SENSITIVITY: 176 NG/L (ref 0–9)
UROBILINOGEN, URINE: 0.2 E.U./DL
WBC # BLD: 15.3 E9/L (ref 4.5–11.5)
WBC UA: ABNORMAL /HPF (ref 0–5)

## 2021-09-28 PROCEDURE — 99222 1ST HOSP IP/OBS MODERATE 55: CPT | Performed by: STUDENT IN AN ORGANIZED HEALTH CARE EDUCATION/TRAINING PROGRAM

## 2021-09-28 PROCEDURE — 6370000000 HC RX 637 (ALT 250 FOR IP): Performed by: EMERGENCY MEDICINE

## 2021-09-28 PROCEDURE — 80307 DRUG TEST PRSMV CHEM ANLYZR: CPT

## 2021-09-28 PROCEDURE — 6370000000 HC RX 637 (ALT 250 FOR IP): Performed by: STUDENT IN AN ORGANIZED HEALTH CARE EDUCATION/TRAINING PROGRAM

## 2021-09-28 PROCEDURE — 83605 ASSAY OF LACTIC ACID: CPT

## 2021-09-28 PROCEDURE — 82805 BLOOD GASES W/O2 SATURATION: CPT

## 2021-09-28 PROCEDURE — 87635 SARS-COV-2 COVID-19 AMP PRB: CPT

## 2021-09-28 PROCEDURE — 2580000003 HC RX 258: Performed by: EMERGENCY MEDICINE

## 2021-09-28 PROCEDURE — 82962 GLUCOSE BLOOD TEST: CPT

## 2021-09-28 PROCEDURE — 85610 PROTHROMBIN TIME: CPT

## 2021-09-28 PROCEDURE — P9047 ALBUMIN (HUMAN), 25%, 50ML: HCPCS | Performed by: INTERNAL MEDICINE

## 2021-09-28 PROCEDURE — 93005 ELECTROCARDIOGRAM TRACING: CPT | Performed by: EMERGENCY MEDICINE

## 2021-09-28 PROCEDURE — 6360000002 HC RX W HCPCS: Performed by: INTERNAL MEDICINE

## 2021-09-28 PROCEDURE — 80179 DRUG ASSAY SALICYLATE: CPT

## 2021-09-28 PROCEDURE — 70450 CT HEAD/BRAIN W/O DYE: CPT

## 2021-09-28 PROCEDURE — 71045 X-RAY EXAM CHEST 1 VIEW: CPT

## 2021-09-28 PROCEDURE — 83735 ASSAY OF MAGNESIUM: CPT

## 2021-09-28 PROCEDURE — 80143 DRUG ASSAY ACETAMINOPHEN: CPT

## 2021-09-28 PROCEDURE — 87150 DNA/RNA AMPLIFIED PROBE: CPT

## 2021-09-28 PROCEDURE — 87088 URINE BACTERIA CULTURE: CPT

## 2021-09-28 PROCEDURE — 96367 TX/PROPH/DG ADDL SEQ IV INF: CPT

## 2021-09-28 PROCEDURE — 6360000002 HC RX W HCPCS: Performed by: EMERGENCY MEDICINE

## 2021-09-28 PROCEDURE — 82077 ASSAY SPEC XCP UR&BREATH IA: CPT

## 2021-09-28 PROCEDURE — 87040 BLOOD CULTURE FOR BACTERIA: CPT

## 2021-09-28 PROCEDURE — 85025 COMPLETE CBC W/AUTO DIFF WBC: CPT

## 2021-09-28 PROCEDURE — 87449 NOS EACH ORGANISM AG IA: CPT

## 2021-09-28 PROCEDURE — 93010 ELECTROCARDIOGRAM REPORT: CPT | Performed by: INTERNAL MEDICINE

## 2021-09-28 PROCEDURE — 96365 THER/PROPH/DIAG IV INF INIT: CPT

## 2021-09-28 PROCEDURE — 83036 HEMOGLOBIN GLYCOSYLATED A1C: CPT

## 2021-09-28 PROCEDURE — 80053 COMPREHEN METABOLIC PANEL: CPT

## 2021-09-28 PROCEDURE — 1200000000 HC SEMI PRIVATE

## 2021-09-28 PROCEDURE — 0202U NFCT DS 22 TRGT SARS-COV-2: CPT

## 2021-09-28 PROCEDURE — 36415 COLL VENOUS BLD VENIPUNCTURE: CPT

## 2021-09-28 PROCEDURE — 82010 KETONE BODYS QUAN: CPT

## 2021-09-28 PROCEDURE — 81001 URINALYSIS AUTO W/SCOPE: CPT

## 2021-09-28 PROCEDURE — 84703 CHORIONIC GONADOTROPIN ASSAY: CPT

## 2021-09-28 PROCEDURE — 84145 PROCALCITONIN (PCT): CPT

## 2021-09-28 PROCEDURE — 84484 ASSAY OF TROPONIN QUANT: CPT

## 2021-09-28 PROCEDURE — 99285 EMERGENCY DEPT VISIT HI MDM: CPT

## 2021-09-28 PROCEDURE — 85730 THROMBOPLASTIN TIME PARTIAL: CPT

## 2021-09-28 RX ORDER — METOCLOPRAMIDE 10 MG/1
5 TABLET ORAL 3 TIMES DAILY
Status: DISCONTINUED | OUTPATIENT
Start: 2021-09-28 | End: 2021-10-04 | Stop reason: HOSPADM

## 2021-09-28 RX ORDER — ROPINIROLE 0.25 MG/1
0.25 TABLET, FILM COATED ORAL NIGHTLY
Status: DISCONTINUED | OUTPATIENT
Start: 2021-09-28 | End: 2021-10-04 | Stop reason: HOSPADM

## 2021-09-28 RX ORDER — POLYETHYLENE GLYCOL 3350 17 G/17G
17 POWDER, FOR SOLUTION ORAL DAILY PRN
Status: DISCONTINUED | OUTPATIENT
Start: 2021-09-28 | End: 2021-10-04 | Stop reason: HOSPADM

## 2021-09-28 RX ORDER — DEXTROSE MONOHYDRATE 25 G/50ML
12.5 INJECTION, SOLUTION INTRAVENOUS PRN
Status: DISCONTINUED | OUTPATIENT
Start: 2021-09-28 | End: 2021-10-04 | Stop reason: HOSPADM

## 2021-09-28 RX ORDER — ACETAMINOPHEN 325 MG/1
650 TABLET ORAL EVERY 6 HOURS PRN
Status: DISCONTINUED | OUTPATIENT
Start: 2021-09-28 | End: 2021-09-28

## 2021-09-28 RX ORDER — ACETAMINOPHEN 650 MG/1
650 SUPPOSITORY RECTAL EVERY 6 HOURS PRN
Status: DISCONTINUED | OUTPATIENT
Start: 2021-09-28 | End: 2021-10-04 | Stop reason: HOSPADM

## 2021-09-28 RX ORDER — BUMETANIDE 2 MG/1
2 TABLET ORAL 2 TIMES DAILY
Status: ON HOLD | COMMUNITY
End: 2021-12-12 | Stop reason: HOSPADM

## 2021-09-28 RX ORDER — ONDANSETRON 2 MG/ML
4 INJECTION INTRAMUSCULAR; INTRAVENOUS EVERY 6 HOURS PRN
Status: DISCONTINUED | OUTPATIENT
Start: 2021-09-28 | End: 2021-10-04 | Stop reason: HOSPADM

## 2021-09-28 RX ORDER — DEXTROSE MONOHYDRATE 50 MG/ML
100 INJECTION, SOLUTION INTRAVENOUS PRN
Status: DISCONTINUED | OUTPATIENT
Start: 2021-09-28 | End: 2021-10-01

## 2021-09-28 RX ORDER — PANTOPRAZOLE SODIUM 40 MG/1
40 TABLET, DELAYED RELEASE ORAL
Status: DISCONTINUED | OUTPATIENT
Start: 2021-09-29 | End: 2021-10-04 | Stop reason: HOSPADM

## 2021-09-28 RX ORDER — ARIPIPRAZOLE 5 MG/1
5 TABLET ORAL NIGHTLY
Status: DISCONTINUED | OUTPATIENT
Start: 2021-09-28 | End: 2021-10-04 | Stop reason: HOSPADM

## 2021-09-28 RX ORDER — SODIUM CHLORIDE 9 MG/ML
INJECTION, SOLUTION INTRAVENOUS EVERY 12 HOURS
Status: DISCONTINUED | OUTPATIENT
Start: 2021-09-29 | End: 2021-10-04 | Stop reason: HOSPADM

## 2021-09-28 RX ORDER — HEPARIN SODIUM 5000 [USP'U]/ML
5000 INJECTION, SOLUTION INTRAVENOUS; SUBCUTANEOUS EVERY 8 HOURS SCHEDULED
Status: DISCONTINUED | OUTPATIENT
Start: 2021-09-28 | End: 2021-09-28

## 2021-09-28 RX ORDER — SODIUM CHLORIDE 0.9 % (FLUSH) 0.9 %
5-40 SYRINGE (ML) INJECTION PRN
Status: DISCONTINUED | OUTPATIENT
Start: 2021-09-28 | End: 2021-09-28 | Stop reason: SDUPTHER

## 2021-09-28 RX ORDER — ALBUMIN (HUMAN) 12.5 G/50ML
25 SOLUTION INTRAVENOUS EVERY 8 HOURS
Status: DISPENSED | OUTPATIENT
Start: 2021-09-28 | End: 2021-10-01

## 2021-09-28 RX ORDER — SODIUM CHLORIDE 0.9 % (FLUSH) 0.9 %
5-40 SYRINGE (ML) INJECTION PRN
Status: DISCONTINUED | OUTPATIENT
Start: 2021-09-28 | End: 2021-09-30 | Stop reason: SDUPTHER

## 2021-09-28 RX ORDER — NICOTINE POLACRILEX 4 MG
15 LOZENGE BUCCAL PRN
Status: DISCONTINUED | OUTPATIENT
Start: 2021-09-28 | End: 2021-10-04 | Stop reason: HOSPADM

## 2021-09-28 RX ORDER — BUMETANIDE 1 MG/1
2 TABLET ORAL 2 TIMES DAILY
Status: DISCONTINUED | OUTPATIENT
Start: 2021-09-28 | End: 2021-10-04 | Stop reason: HOSPADM

## 2021-09-28 RX ORDER — ACETAMINOPHEN 325 MG/1
650 TABLET ORAL EVERY 6 HOURS PRN
Status: DISCONTINUED | OUTPATIENT
Start: 2021-09-28 | End: 2021-10-04 | Stop reason: HOSPADM

## 2021-09-28 RX ORDER — 0.9 % SODIUM CHLORIDE 0.9 %
500 INTRAVENOUS SOLUTION INTRAVENOUS ONCE
Status: COMPLETED | OUTPATIENT
Start: 2021-09-28 | End: 2021-09-28

## 2021-09-28 RX ORDER — SODIUM CHLORIDE 9 MG/ML
25 INJECTION, SOLUTION INTRAVENOUS PRN
Status: DISCONTINUED | OUTPATIENT
Start: 2021-09-28 | End: 2021-09-30 | Stop reason: SDUPTHER

## 2021-09-28 RX ORDER — FLUCONAZOLE 2 MG/ML
400 INJECTION, SOLUTION INTRAVENOUS EVERY 24 HOURS
Status: DISCONTINUED | OUTPATIENT
Start: 2021-09-28 | End: 2021-10-04 | Stop reason: HOSPADM

## 2021-09-28 RX ORDER — SODIUM CHLORIDE 0.9 % (FLUSH) 0.9 %
5-40 SYRINGE (ML) INJECTION EVERY 12 HOURS SCHEDULED
Status: DISCONTINUED | OUTPATIENT
Start: 2021-09-28 | End: 2021-09-30 | Stop reason: SDUPTHER

## 2021-09-28 RX ORDER — HEPARIN SODIUM 5000 [USP'U]/ML
5000 INJECTION, SOLUTION INTRAVENOUS; SUBCUTANEOUS 2 TIMES DAILY
Status: DISCONTINUED | OUTPATIENT
Start: 2021-09-28 | End: 2021-10-04 | Stop reason: HOSPADM

## 2021-09-28 RX ORDER — ACETAMINOPHEN 325 MG/1
650 TABLET ORAL ONCE
Status: COMPLETED | OUTPATIENT
Start: 2021-09-28 | End: 2021-09-28

## 2021-09-28 RX ORDER — FOLIC ACID 1 MG/1
1 TABLET ORAL DAILY
Status: DISCONTINUED | OUTPATIENT
Start: 2021-09-28 | End: 2021-10-04 | Stop reason: HOSPADM

## 2021-09-28 RX ORDER — SODIUM CHLORIDE 0.9 % (FLUSH) 0.9 %
5-40 SYRINGE (ML) INJECTION EVERY 12 HOURS SCHEDULED
Status: DISCONTINUED | OUTPATIENT
Start: 2021-09-28 | End: 2021-09-28 | Stop reason: SDUPTHER

## 2021-09-28 RX ORDER — INSULIN GLARGINE 100 [IU]/ML
10 INJECTION, SOLUTION SUBCUTANEOUS 2 TIMES DAILY
Status: DISCONTINUED | OUTPATIENT
Start: 2021-09-28 | End: 2021-10-04 | Stop reason: HOSPADM

## 2021-09-28 RX ORDER — ACETAMINOPHEN 650 MG/1
650 SUPPOSITORY RECTAL EVERY 6 HOURS PRN
Status: DISCONTINUED | OUTPATIENT
Start: 2021-09-28 | End: 2021-09-28

## 2021-09-28 RX ORDER — ONDANSETRON 4 MG/1
4 TABLET, ORALLY DISINTEGRATING ORAL EVERY 8 HOURS PRN
Status: DISCONTINUED | OUTPATIENT
Start: 2021-09-28 | End: 2021-10-04 | Stop reason: HOSPADM

## 2021-09-28 RX ORDER — SEVELAMER CARBONATE 800 MG/1
800 TABLET, FILM COATED ORAL
Status: DISCONTINUED | OUTPATIENT
Start: 2021-09-28 | End: 2021-10-04 | Stop reason: HOSPADM

## 2021-09-28 RX ORDER — MIRTAZAPINE 15 MG/1
15 TABLET, FILM COATED ORAL NIGHTLY
Status: DISCONTINUED | OUTPATIENT
Start: 2021-09-28 | End: 2021-10-04 | Stop reason: HOSPADM

## 2021-09-28 RX ORDER — LEVOTHYROXINE SODIUM 0.07 MG/1
75 TABLET ORAL DAILY
Status: DISCONTINUED | OUTPATIENT
Start: 2021-09-28 | End: 2021-10-04 | Stop reason: HOSPADM

## 2021-09-28 RX ORDER — SODIUM CHLORIDE 9 MG/ML
25 INJECTION, SOLUTION INTRAVENOUS PRN
Status: DISCONTINUED | OUTPATIENT
Start: 2021-09-28 | End: 2021-09-28 | Stop reason: SDUPTHER

## 2021-09-28 RX ORDER — HYDROXYZINE HYDROCHLORIDE 25 MG/1
25 TABLET, FILM COATED ORAL DAILY
Status: DISCONTINUED | OUTPATIENT
Start: 2021-09-28 | End: 2021-10-04 | Stop reason: HOSPADM

## 2021-09-28 RX ORDER — PANTOPRAZOLE SODIUM 40 MG/1
40 TABLET, DELAYED RELEASE ORAL DAILY PRN
COMMUNITY
End: 2021-12-07

## 2021-09-28 RX ADMIN — ACETAMINOPHEN 650 MG: 325 TABLET ORAL at 23:29

## 2021-09-28 RX ADMIN — METOCLOPRAMIDE 5 MG: 10 TABLET ORAL at 23:29

## 2021-09-28 RX ADMIN — SODIUM CHLORIDE 500 ML: 9 INJECTION, SOLUTION INTRAVENOUS at 11:36

## 2021-09-28 RX ADMIN — FOLIC ACID 1 MG: 1 TABLET ORAL at 14:53

## 2021-09-28 RX ADMIN — PIPERACILLIN AND TAZOBACTAM 3375 MG: 3; .375 INJECTION, POWDER, FOR SOLUTION INTRAVENOUS at 11:29

## 2021-09-28 RX ADMIN — METOPROLOL TARTRATE 25 MG: 25 TABLET, FILM COATED ORAL at 14:54

## 2021-09-28 RX ADMIN — BUMETANIDE 2 MG: 1 TABLET ORAL at 15:07

## 2021-09-28 RX ADMIN — LEVOTHYROXINE SODIUM 75 MCG: 0.07 TABLET ORAL at 15:07

## 2021-09-28 RX ADMIN — INSULIN GLARGINE 10 UNITS: 100 INJECTION, SOLUTION SUBCUTANEOUS at 15:08

## 2021-09-28 RX ADMIN — METOCLOPRAMIDE 5 MG: 10 TABLET ORAL at 14:53

## 2021-09-28 RX ADMIN — VANCOMYCIN HYDROCHLORIDE 1000 MG: 1 INJECTION, POWDER, LYOPHILIZED, FOR SOLUTION INTRAVENOUS at 12:16

## 2021-09-28 RX ADMIN — SEVELAMER CARBONATE 800 MG: 800 TABLET, FILM COATED ORAL at 15:07

## 2021-09-28 RX ADMIN — ALBUMIN (HUMAN) 25 G: 0.25 INJECTION, SOLUTION INTRAVENOUS at 23:29

## 2021-09-28 RX ADMIN — HYDROXYZINE HYDROCHLORIDE 25 MG: 25 TABLET ORAL at 14:53

## 2021-09-28 RX ADMIN — MIRTAZAPINE 15 MG: 15 TABLET, FILM COATED ORAL at 23:29

## 2021-09-28 RX ADMIN — HEPARIN SODIUM 5000 UNITS: 5000 INJECTION INTRAVENOUS; SUBCUTANEOUS at 14:57

## 2021-09-28 RX ADMIN — METOPROLOL TARTRATE 25 MG: 25 TABLET, FILM COATED ORAL at 23:29

## 2021-09-28 RX ADMIN — ACETAMINOPHEN 650 MG: 325 TABLET ORAL at 11:35

## 2021-09-28 ASSESSMENT — PAIN DESCRIPTION - PAIN TYPE: TYPE: CHRONIC PAIN

## 2021-09-28 ASSESSMENT — PAIN DESCRIPTION - LOCATION: LOCATION: GENERALIZED

## 2021-09-28 ASSESSMENT — PAIN SCALES - GENERAL
PAINLEVEL_OUTOF10: 0
PAINLEVEL_OUTOF10: 7
PAINLEVEL_OUTOF10: 3
PAINLEVEL_OUTOF10: 7

## 2021-09-28 ASSESSMENT — PAIN DESCRIPTION - DESCRIPTORS: DESCRIPTORS: ACHING;DISCOMFORT;SORE

## 2021-09-28 NOTE — PROGRESS NOTES
Pharmacy Consultation Note  (Antibiotic Dosing and Monitoring)    Initial consult date: 21  Consulting physician: Dr Anat García  Drug(s): Vancomycin IV  Indication: Pneumonia    Ht Readings from Last 1 Encounters:   21 5' 4\" (1.626 m)     Wt Readings from Last 1 Encounters:   21 152 lb 7 oz (69.1 kg)       Age/  Gender Act BW IBW DW  Allergy Information   36 y.o.     female 69.1 kg 54.7 kg 60.5 kg  Cefepime and Toradol [ketorolac tromethamine]                 Date  WBC HD Drug/Dose Time   Given Level(s)   (Time) Comments     (#1) 15.3 -- Vancomycin 1000 mg IV x 1 1216       (#2)           (#3)           (#4)           (#5)           (#6)           (#7)           Estimated Creatinine Clearance: 16 mL/min (A) (based on SCr of 5.1 mg/dL (H)). UOP over the past 24 hours:       Intake/Output Summary (Last 24 hours) at 2021 1828  Last data filed at 2021 1151  Gross per 24 hour   Intake    Output 100 ml   Net -100 ml       Temp max: Temp (24hrs), Av.9 °F (37.7 °C), Min:98.8 °F (37.1 °C), Max:101 °F (38.3 °C)      Antibiotic Regimen:  Antibiotic Dose Date Initiated   Zosyn 3.375 g IV Q12H      Cultures:  available culture and sensitivity results were reviewed in EPIC  Cultures sent and are pending. Culture Date Result    Blood cx #1     Covid-19  Not detected   Respiratory Panel  Rhinovirus   Urine cx       Assessment:  · Consulted by Dr. Anat García to dose/monitor vancomycin  · Goal trough level:  15-20 mcg/mL  · Pt is a 35 y/o F with a Hx of noncompliance to home medications and frequent resultant hospital admissions. Admitted for decreased responsiveness and hypoxia  · Peritoneal dialysis outpatient.  Has Tessio to use for HD inpatient    Plan:  · Vancomycin 1000 mg IV x 1 in the ED  · Dosing and levels according to HD schedule  · Random level tomorrow morning  · Pharmacist will follow and monitor/adjust dosing as necessary      Thank you for the consult,    Hillary Rizzo Philomena Rodriguez, PharmD 9/28/2021 6:59 PM   [phone]

## 2021-09-28 NOTE — H&P
[address] [place], [place] [address]ist Group   History and Physical      CHIEF COMPLAINT:  AMS    History of Present Illness:  36 y.o. female with a history of poorly controlled diabetes, multiple admissions for DKA, ESRD recently transitioned from PD to HD, cardiac arrest, HFpEF, gastroparesis, depression, hypothyroidism, hyperlipidemia, iron deficiency anemia. presents with AMS. She was found at her home minimally responsive and EMS was called. EMS stated she was hypoxic in 80s upon transfer and was placed on nasal canula. In the ER she was more responsive but still very somnolent. Patient's history is limited as she is still very sleepy. She states that she was feeling sick the past couple of days, had sore throat and cough. She denied any N/V/D or abdominal pain. She also denied any fever/chills. Informant(s) for H&P:Patient, ER, EMR review    REVIEW OF SYSTEMS:  no fevers, chills, cp, sob, n/v, ha, vision/hearing changes, wt changes, hot/cold flashes, other open skin lesions, diarrhea, constipation, dysuria/hematuria unless noted in HPI. Complete ROS performed with the patient and is otherwise negative.       PMH:  Past Medical History:   Diagnosis Date    Acute congestive heart failure ([address]     BC (acute kidney injury) ([address] 10/01/2019    Cardiac arrest ([address] 02/15/2021    Cephalgia 10/09/2019    Chronic kidney disease     Depression     Diabetes mellitus ([address]     Diabetic gastroparesis associated with type 1 diabetes mellitus ([address] 12/17/2018    Diabetic ketoacidosis ([address] 08/27/2011    Diabetic ketoacidosis with coma associated with type 1 diabetes mellitus ([address] 06/26/2013    Diabetic polyneuropathy associated with type 1 diabetes mellitus ([address] 12/27/2020    Drug use complicating pregnancy in third trimester     Endocarditis 10/31/2020    ESRD (end stage renal disease) ([address] 09/29/2020    H/O cardiovascular stress test 08/27/2021    Lexiscan    Hemodialysis patient [facility])     Bianca Rock MD at [facility] ESOPHAGOGASTRODUODENOSCOPY TRANSORAL DIAGNOSTIC N/A 5/30/2018    EGD ESOPHAGOGASTRODUODENOSCOPY performed by Yung Soriano MD at [address] [place] [address] TRANSESOPHAGEAL ECHOCARDIOGRAM N/A 10/19/2020    TRANSESOPHAGEAL ECHOCARDIOGRAM WITH BUBBLE STUDY performed by Elizabeth Ponce MD at [address] [place] [address] TUNNELED VENOUS PORT PLACEMENT  04/2018    UPPER GASTROINTESTINAL ENDOSCOPY  12/18/2018    EGD BIOPSY performed by Jaycob Knight MD at [address] [facility] 10/11/2019    EGD ESOPHAGOGASTRODUODENOSCOPY performed by Yaa Arrieta DO at [address] [facility] N/A 7/14/2021    EGD BIOPSY performed by John Mittal MD at [facility] 23       Medications Prior to Admission:    Prior to Admission medications    Medication Sig Start Date End Date Taking? Authorizing Provider   insulin glargine (LANTUS) 100 UNIT/ML injection vial Inject 10 Units into the skin 2 times daily 9/15/21   Brodie Pulido MD   gentamicin (GARAMYCIN) 0.1 % cream Apply topically 3 times daily. 9/15/21   Brodie Pulido MD   vitamin D (ERGOCALCIFEROL) 1.25 MG (91471 UT) CAPS capsule Take 1 capsule by mouth once a week 9/3/21   Car Herrera MD   metoprolol tartrate (LOPRESSOR) 25 MG tablet Take 25 mg by mouth 2 times daily    Historical Provider, MD   bumetanide (BUMEX) 1 MG tablet Take 2 mg by mouth 2 times daily    Historical Provider, MD   pregabalin (LYRICA) 25 MG capsule Take 1 capsule by mouth daily for 30 days. 7/16/21 8/26/21  Car Herrera MD   sevelamer (RENVELA) 800 MG tablet Take 1 tablet by mouth 3 times daily (with meals) New lower dose 7/2/21   Car Herrera MD   insulin lispro, 1 Unit Dial, (HUMALOG KWIKPEN) 100 UNIT/ML SOPN Inject 3 units with meals + sliding scale.  MAX 30U/day 6/1/21   Adrian Salgado MD   Insulin Pen Needle (BD PEN NEEDLE NAV U/F) 32G X 4 MM MISC Uses with insulin 4 times a day 6/1/21   [facility] Jose Juan Pina MD   blood glucose monitor strips Freestyle Lite Strips. Checks 4 times/day before meals and at bedtime and as needed for symptoms of irregular blood glucose. 6/1/21   Zeke Pina MD   folic acid (FOLVITE) 1 MG tablet Take 1 tablet by mouth daily 5/19/21 8/26/21  Arias Dubois MD   mirtazapine (REMERON) 15 MG tablet Take 15 mg by mouth nightly    Historical Provider, MD   hydrOXYzine (ATARAX) 25 MG tablet Take 25 mg by mouth daily    Historical Provider, MD   polyethylene glycol (GLYCOLAX) 17 g packet Take 17 g by mouth daily as needed for Constipation    Historical Provider, MD   ARIPiprazole (ABILIFY) 5 MG tablet Take 5 mg by mouth nightly 4/18/21   Historical Provider, MD   Glucagon rDNA, (GLUCAGON EMERGENCY IJ) Inject as directed    Historical Provider, MD   glucose (GLUTOSE) 40 % GEL Take 15 g by mouth See Admin Instructions    Historical Provider, MD   pantoprazole (PROTONIX) 40 MG tablet Take 1 tablet by mouth every morning (before breakfast) 4/19/21   Jacinda Littlejohn DO   levothyroxine (SYNTHROID) 75 MCG tablet TAKE 1 TABLET BY MOUTH IN THE MORNING BEFORE BREAKFAST 4/19/21   Jacinda Littlejohn DO   epoetin nena-epbx (RETACRIT) 3000 UNIT/ML SOLN injection Inject 1 mL into the skin three times a week 3/1/21   Kayla Ramachandran MD   rOPINIRole (REQUIP) 0.25 MG tablet Take 0.25 mg by mouth nightly    Historical Provider, MD   blood glucose test strips (CONTOUR NEXT TEST) strip 1 each by In Vitro route 5 times daily As needed. 12/14/20   Zeke Pina MD   metoclopramide (REGLAN) 5 MG tablet Take 5 mg by mouth 3 times daily     Historical Provider, MD       Allergies:    Cefepime and Toradol [ketorolac tromethamine]    Social History:    reports that she quit smoking about 7 months ago. Her smoking use included cigarettes. She has a 3.00 pack-year smoking history. She has never used smokeless tobacco. She reports that she does not drink alcohol and does not use drugs.       Family History:   family history includes Asthma in her brother and mother; Diabetes in her mother; High Blood Pressure in her father and mother; Hypertension in her mother. PHYSICAL EXAM:  Vitals:  /88   Pulse 108   Temp 99.9 °F (37.7 °C) (Bladder)   Resp 20   Ht 5' 4\" (1.626 m)   Wt 152 lb 7 oz (69.1 kg)   LMP  (LMP Unknown)   SpO2 100%   BMI 26.17 kg/m²     General Appearance: somnolent but oriented to person, place and time and in no acute distress, appears edematous in face and extremities  Skin: warm and dry, RIJ TDC in place, is slightly tender to palpation and dressing does not appear clean. Head: normocephalic and atraumatic  Eyes: pupils equal, round, and reactive to light, extraocular eye movements intact, conjunctivae normal  Neck: neck supple and non tender without mass   Pulmonary/Chest: clear to auscultation bilaterally- no wheezes, rales or rhonchi, poor inspiratory effort, no respiratory distress  Cardiovascular: normal rate, normal S1 and S2 and no carotid bruits  Abdomen: soft, non-tender, non-distended, normal bowel sounds, no masses or organomegaly  Extremities: no cyanosis, no clubbing and edema present in b/l LE  Neurologic: no cranial nerve deficit and speech normal however mumbled     LABS:  Recent Labs     09/28/21  1000      K 4.1   CL 93*   CO2 34*   BUN 14   CREATININE 5.1*   GLUCOSE 228*   CALCIUM 7.7*       Recent Labs     09/28/21  1000   WBC 15.3*   RBC 2.73*   HGB 8.3*   HCT 25.6*   MCV 93.8   MCH 30.4   MCHC 32.4   RDW 14.9      MPV 10.7       No results for input(s): POCGLU in the last 72 hours.         Radiology: CT HEAD WO CONTRAST    Result Date: 9/28/2021  EXAMINATION: CT OF THE HEAD WITHOUT CONTRAST  9/28/2021 11:05 am TECHNIQUE: CT of the head was performed without the administration of intravenous contrast. Dose modulation, iterative reconstruction, and/or weight based adjustment of the mA/kV was utilized to reduce the radiation dose to as low as reasonably achievable. COMPARISON: CT head 02/15/2021 HISTORY: ORDERING SYSTEM PROVIDED HISTORY: ams TECHNOLOGIST PROVIDED HISTORY: Has a \"code stroke\" or \"stroke alert\" been called? ->No Reason for exam:->ams Decision Support Exception - unselect if not a suspected or confirmed emergency medical condition->Emergency Medical Condition (MA) FINDINGS: There are no areas of abnormal attenuation within the brain parenchyma. No evidence of mass, mass effect, or midline shift. The ventricles and sulci are of normal size and configuration. No extra-axial fluid collections or acute hemorrhage. The gray-white differentiation appears preserved without evidence of acute cortical ischemia. The calvarium is intact. There is minimal mucosal thickening within the visualized portions of the maxillary sinuses. The remaining visualized paranasal sinuses and mastoid air cells are clear. No acute intracranial abnormality. XR CHEST PORTABLE    Result Date: 9/28/2021  EXAMINATION: ONE XRAY VIEW OF THE CHEST 9/28/2021 7:06 am COMPARISON: One-view chest x-ray 09/08/2021 HISTORY: ORDERING SYSTEM PROVIDED HISTORY: ams TECHNOLOGIST PROVIDED HISTORY: Reason for exam:->ams FINDINGS: There is mild enlargement of the cardiac silhouette, which may be exaggerated by AP technique. The mediastinal contours are within normal limits. A large caliber right internal jugular central venous catheter terminates in the region of the right atrium. The lungs are mildly hypoinflated. There are basilar predominant linear/hazy opacities, right greater than left. No significant pleural effusions or pneumothorax. No osseous abnormality is identified. Surgical clips are present in the right upper quadrant. Basilar predominant linear/hazy opacities likely represent a component of atelectasis. Pulmonary edema or atypical inflammation would be additional considerations.        EKG: Sinus tachycardia    ASSESSMENT:      Active Problems: Sepsis ([facility]  Resolved Problems:    * No resolved hospital problems. *      PLAN:    1. Sepsis of unknown origin  -Patient was altered on presentation to ER  -Tachycardic, febrile, WBC 15  -BCx drawn in ER, CXR revealed bibasilar linear opacities, atelectasis vs pleural effusion vs inflammation.  -Will check procal, urine culture, strep and legionella, viral resp panel and resp culture  -Was given vanc and zosyn in ER, will continue and consult ID    2. AMS  -Was altered at home, is now alert but somnolent  -Likely 2/2 #1  -Will monitor mental status    3. Acute hypoxic respiratory failure  -Patient was reported to be in 80s on initial EMS eval  -Was 90% on RA in ER, placed on 2L O2 via NC and now 100%  -ABG revealed 7.476/46.6/40.3/33.6  -CXR with bibasilar linear opacities, will workup for possible PNA  -Wean O2 as tolerated    4. DM  -History established, home meds lantus 10 units BID, lispro 3 units TIDAC and ISS  -Glucose 228 on admission, AG 6,  BHB 0.6, pH 7.476 - not in DKA or HHS  -Will continue home meds and monitor FSBG    5. ESRD on HD  -Switched from PD to HD after last admission  -Still has PD catheter in   -Consult nephro for HD  -Continue home sevelamer and retacrit    6. Hypothyroidism  -Continue home synthroid     7. MDD  -Continue home abilify and remeron    8. Gastroparesis  -Continue home reglan    9. HFpEF  -Continue home bumex and metoprolol tartrate      Code Status: full  DVT prophylaxis: heparin    NOTE: This report was transcribed using voice recognition software.  Every effort was made to ensure accuracy; however, inadvertent computerized transcription errors may be present.     Electronically signed by Shante Raphael MD on 9/28/2021 at 1:32 PM

## 2021-09-28 NOTE — Clinical Note
Patient Class: Inpatient [101]   REQUIRED: Diagnosis: Sepsis ([facility] [[id number]]   Estimated Length of Stay: Estimated stay of more than 2 midnights   Admitting Provider: Wendy Santos [[id number]]

## 2021-09-28 NOTE — ED PROVIDER NOTES
Chief complaint:  Unresponsive    HPI history provided by EMS  Patient brought in by EMS from home, the report was that she was minimally responsive. They state the oxygen level was in the high 80s when they got there so they gave her nasal cannula oxygen. Patient is offering no information due to condition. EMS has limited other information. She is known to be at home peritoneal dialysis, also has a Tessio on the right chest wall. Review of Systems   Unable to perform ROS: Patient unresponsive        Physical Exam  Vitals and nursing note reviewed. Constitutional:       Appearance: She is well-developed. She is not diaphoretic. Comments: Patient keeps her eyes closed although she does slowly help with the exam, she will move her head around and when we sit her up in the bed is able to help hold some postural tone as I listen to her lungs although slowly and gently slumped herself back into the bed. HENT:      Head: Normocephalic and atraumatic. Comments: No sign of acute head or face injury  Eyes:      General: No scleral icterus. Extraocular Movements: Extraocular movements intact. Conjunctiva/sclera: Conjunctivae normal.      Pupils: Pupils are equal, round, and reactive to light. Cardiovascular:      Rate and Rhythm: Normal rate and regular rhythm. Heart sounds: Normal heart sounds. No murmur heard. Pulmonary:      Effort: Pulmonary effort is normal. No respiratory distress. Breath sounds: Normal breath sounds. No stridor, decreased air movement or transmitted upper airway sounds. No decreased breath sounds, wheezing, rhonchi or rales. Abdominal:      General: Bowel sounds are normal. There is no distension. Palpations: Abdomen is soft. Tenderness: There is no guarding. Comments: Soft with no guarding or rigidity. No gross distention.   Dialysis catheter to left abdominal wall with no surrounding erythema or purulent drainage   Musculoskeletal: General: No signs of injury. Cervical back: No signs of trauma or rigidity. Right lower leg: No edema. Left lower leg: No edema. Comments: Arms and legs with no signs of acute bone or joint injuries   Skin:     General: Skin is warm and dry. Coloration: Skin is not cyanotic, jaundiced, mottled or pale. Findings: No erythema or rash. Neurological:      Mental Status: She is lethargic. GCS: GCS eye subscore is 3. GCS verbal subscore is 1. GCS motor subscore is 6. Procedures     MDM     ED Course as of Sep 28 1232   Tue Sep 28, 2021   1158 Patient was sleeping but easily awakened and is now much more awake and alert when awake. She is able to speak and give me her name. Vital signs are improving. [NC]   18 Dr. Kateryna Mathur he had been consulted on the admission orders for renal, he does call back while patient is in the ER and I do discussed the case with him, he is updated on patient's condition and admission. [NC]      ED Course User Index  [NC] Anthony Keith DO      EKG Interpretation    Interpreted by emergency department physician    Rhythm: sinus tachycardia  Rate: 122  Axis: normal  Ectopy: none  Conduction: normal  ST Segments: normal  T Waves: normal  Q Waves: none    Clinical Impression: no acute changes    Anthony Keith DO    ED Course as of Sep 28 1232   Tue Sep 28, 2021   1158 Patient was sleeping but easily awakened and is now much more awake and alert when awake. She is able to speak and give me her name. Vital signs are improving. [NC]   18 Dr. Katernya Mathur he had been consulted on the admission orders for renal, he does call back while patient is in the ER and I do discussed the case with him, he is updated on patient's condition and admission.     [NC]      ED Course User Index  [NC] Anthony eKith, DO       --------------------------------------------- PAST HISTORY ---------------------------------------------  Past Medical History:  has a past medical history of Acute congestive heart failure ([facility] [address] BC (acute kidney injury) ([facility] [address] Cardiac arrest ([facility] [address] Cephalgia, Chronic kidney disease, Depression, Diabetes mellitus ([address] Diabetic gastroparesis associated with type 1 diabetes mellitus ([address] Diabetic ketoacidosis ([facility] [address] Diabetic ketoacidosis with coma associated with type 1 diabetes mellitus ([address] Diabetic polyneuropathy associated with type 1 diabetes mellitus ([address] Drug use complicating pregnancy in third trimester, Endocarditis, ESRD (end stage renal disease) ([facility] [address] H/O cardiovascular stress test, Hemodialysis patient ([facility] [address] History of blood transfusion, Hyperlipidemia, Hyperosmolar hyperglycemic state (HHS) ([facility] [address] Hypothyroidism, Iron deficiency anemia, MDRO (multiple drug resistant organisms) resistance, MRSA (methicillin resistant Staphylococcus aureus), Non compliance w medication regimen, Other disorders of kidney and ureter, Pregnancy, Previous  delivery affecting pregnancy, antepartum, Previous stillbirth or demise, antepartum, Seizure ([facility] [address] Severe pre-eclampsia in third trimester, Shock liver, and Valvular endocarditis. Past Surgical History:  has a past surgical history that includes ECHO Compl W Dop Color Flow (2012); ECHO Compl W Dop Color Flow (6/10/2013);  section; Tunneled venous port placement (2018); pr esophagogastroduodenoscopy transoral diagnostic (N/A, 2018); back surgery; Colonoscopy (N/A, 2018); Colonoscopy (N/A, 2018); Upper gastrointestinal endoscopy (2018); Upper gastrointestinal endoscopy (N/A, 10/11/2019); Cholecystectomy, laparoscopic (N/A, 2019); LAPAROSCOPY INSERTION PERITONEAL CATHETER (N/A, 2020); transesophageal echocardiogram (N/A, 10/19/2020); embolectomy (N/A, 2020); Foot Debridement (Left, 2021); Foot Debridement (Left, 2021); and Upper gastrointestinal endoscopy (N/A, 2021).     Social History:  reports that she quit smoking about 7 months ago. Her smoking use included cigarettes. She has a 3.00 pack-year smoking history. She has never used smokeless tobacco. She reports that she does not drink alcohol and does not use drugs. Family History: family history includes Asthma in her brother and mother; Diabetes in her mother; High Blood Pressure in her father and mother; Hypertension in her mother. The patients home medications have been reviewed.     Allergies: Cefepime and Toradol [ketorolac tromethamine]    -------------------------------------------------- RESULTS -------------------------------------------------    Lab  Results for orders placed or performed during the hospital encounter of 09/28/21   COVID-19, Rapid    Specimen: Nasopharyngeal Swab   Result Value Ref Range    SARS-CoV-2, NAAT Not Detected Not Detected   Lactate, Sepsis   Result Value Ref Range    Lactic Acid, Sepsis 0.6 0.5 - 1.9 mmol/L   CBC Auto Differential   Result Value Ref Range    WBC 15.3 (H) 4.5 - 11.5 E9/L    RBC 2.73 (L) 3.50 - 5.50 E12/L    Hemoglobin 8.3 (L) 11.5 - 15.5 g/dL    Hematocrit 25.6 (L) 34.0 - 48.0 %    MCV 93.8 80.0 - 99.9 fL    MCH 30.4 26.0 - 35.0 pg    MCHC 32.4 32.0 - 34.5 %    RDW 14.9 11.5 - 15.0 fL    Platelets 001 668 - 456 E9/L    MPV 10.7 7.0 - 12.0 fL    Neutrophils % 86.4 (H) 43.0 - 80.0 %    Immature Granulocytes % 0.8 0.0 - 5.0 %    Lymphocytes % 7.0 (L) 20.0 - 42.0 %    Monocytes % 3.0 2.0 - 12.0 %    Eosinophils % 2.5 0.0 - 6.0 %    Basophils % 0.3 0.0 - 2.0 %    Neutrophils Absolute 13.25 (H) 1.80 - 7.30 E9/L    Immature Granulocytes # 0.13 E9/L    Lymphocytes Absolute 1.07 (L) 1.50 - 4.00 E9/L    Monocytes Absolute 0.46 0.10 - 0.95 E9/L    Eosinophils Absolute 0.38 0.05 - 0.50 E9/L    Basophils Absolute 0.05 0.00 - 0.20 E9/L   Comprehensive Metabolic Panel   Result Value Ref Range    Sodium 133 132 - 146 mmol/L    Potassium 4.1 3.5 - 5.0 mmol/L    Chloride 93 (L) 98 - 107 mmol/L    CO2 34 (H) 22 - 29 mmol/L    Anion Gap 6 (L) 7 - 16 mmol/L    Glucose 228 (H) 74 - 99 mg/dL    BUN 14 6 - 20 mg/dL    CREATININE 5.1 (H) 0.5 - 1.0 mg/dL    GFR Non-African American 12 >=60 mL/min/1.73    GFR African American 12     Calcium 7.7 (L) 8.6 - 10.2 mg/dL    Total Protein 4.9 (L) 6.4 - 8.3 g/dL    Albumin 2.0 (L) 3.5 - 5.2 g/dL    Total Bilirubin <0.2 0.0 - 1.2 mg/dL    Alkaline Phosphatase 213 (H) 35 - 104 U/L    ALT 11 0 - 32 U/L    AST 13 0 - 31 U/L   Protime-INR   Result Value Ref Range    Protime 12.4 9.3 - 12.4 sec    INR 1.1    APTT   Result Value Ref Range    aPTT 29.3 24.5 - 35.1 sec   Magnesium   Result Value Ref Range    Magnesium 1.7 1.6 - 2.6 mg/dL   Troponin   Result Value Ref Range    Troponin, High Sensitivity 176 (H) 0 - 9 ng/L   Urinalysis   Result Value Ref Range    Color, UA Yellow Straw/Yellow    Clarity, UA Clear Clear    Glucose, Ur 500 (A) Negative mg/dL    Bilirubin Urine SMALL (A) Negative    Ketones, Urine Negative Negative mg/dL    Specific Gravity, UA 1.020 1.005 - 1.030    Blood, Urine TRACE (A) Negative    pH, UA 8.0 5.0 - 9.0    Protein, UA >=300 (A) Negative mg/dL    Urobilinogen, Urine 0.2 <2.0 E.U./dL    Nitrite, Urine Negative Negative    Leukocyte Esterase, Urine Negative Negative   Urine Drug Screen   Result Value Ref Range    Drug Screen Comment: see below    Acetaminophen Level   Result Value Ref Range    Acetaminophen Level <5.0 (L) 10.0 - 30.0 mcg/mL   Ethanol   Result Value Ref Range    Ethanol Lvl <91 mg/dL   Salicylate   Result Value Ref Range    Salicylate, Serum <9.7 0.0 - 30.0 mg/dL   Beta-Hydroxybutyrate   Result Value Ref Range    Beta-Hydroxybutyrate 0.60 (H) 0.02 - 0.27 mmol/L   HCG, SERUM, QUALITATIVE   Result Value Ref Range    hCG Qual NEGATIVE NEGATIVE   Blood Gas, Arterial   Result Value Ref Range    Date Analyzed [id number]     Time Analyzed 0949     Source: Blood Arterial     pH, Blood Gas 7.476 (H) 7.350 - 7.450    PCO2 46.6 (H) 35.0 - 45.0 mmHg    PO2 40.3 (LL) 75.0 - 100.0 mmHg    HCO3 33.6 (H) 22.0 - 26.0 mmol/L    B.E. 9.0 (H) -3.0 - 3.0 mmol/L    O2 Sat 77.6 (L) 92.0 - 98.5 %    PO2/FIO2 1.92 mmHg/%    O2Hb 75.4 (L) 94.0 - 97.0 %    COHb 2.5 (H) 0.0 - 1.5 %    MetHb 0.3 0.0 - 1.5 %    O2 Content 10.0 mL/dL    HHb 21.8 (H) 0.0 - 5.0 %    tHb (est) 9.4 (L) 11.5 - 16.5 g/dL    Mode RA     FIO2 21.0 %    Comment with readback     Date Of Collection      Time Collected      Pt Temp 37      ID [id number]     Lab [id number]     Critical(s) Notified Called to TARIK Hoffman RN    Microscopic Urinalysis   Result Value Ref Range    WBC, UA 1-3 0 - 5 /HPF    RBC, UA 0-1 0 - 2 /HPF    Epithelial Cells, UA FEW /HPF    Bacteria, UA RARE (A) None Seen /HPF   EKG 12 Lead   Result Value Ref Range    Ventricular Rate 122 BPM    Atrial Rate 122 BPM    P-R Interval 118 ms    QRS Duration 84 ms    Q-T Interval 332 ms    QTc Calculation (Bazett) 473 ms    P Axis 34 degrees    R Axis 25 degrees    T Axis 29 degrees       Radiology  CT HEAD WO CONTRAST   Final Result   No acute intracranial abnormality. XR CHEST PORTABLE   Final Result   Basilar predominant linear/hazy opacities likely represent a component of   atelectasis. Pulmonary edema or atypical inflammation would be additional   considerations.                 ------------------------- NURSING NOTES AND VITALS REVIEWED ---------------------------  Date / Time Roomed:  9/28/2021  9:36 AM  ED Bed Assignment:  01/01    The nursing notes within the ED encounter and vital signs as below have been reviewed.    Patient Vitals for the past 24 hrs:   BP Temp Temp src Pulse Resp SpO2 Height Weight   09/28/21 1143 136/88 99.9 °F (37.7 °C) Bladder 108 20 100 %     09/28/21 0948 (!) 134/91 101 °F (38.3 °C) Oral 120 20 90 % 5' 4\" (1.626 m) 152 lb 7 oz (69.1 kg)       Oxygen Saturation Interpretation: Abnormal and Improved after treatment          I have spoken with the patient and discussed todays results, in addition to providing specific details for the plan of care and counseling regarding the diagnosis and prognosis. Their questions are answered at this time and they are agreeable with the plan.      --------------------------------- ADDITIONAL PROVIDER NOTES ---------------------------------  Consultations:  2080 pm Spoke with Dr. Billy Castro,  They will admit this patient. This patient's ED course included: a personal history and physicial examination, re-evaluation prior to disposition, multiple bedside re-evaluations, IV medications, cardiac monitoring, continuous pulse oximetry and complex medical decision making and emergency management    This patient has remained hemodynamically stable and improved during their ED course. Please note that the withdrawal or failure to initiate urgent interventions for this patient would likely result in a life threatening deterioration or permanent disability. Accordingly this patient received 30 minutes of critical care time, excluding separately billable procedures. Systems at risk for deterioration include: cardiac. Clinical Impression  1. Sepsis with acute organ dysfunction, due to unspecified organism, unspecified type, unspecified whether septic shock present ([facility] [address]    2. Chronic renal failure syndrome, stage 4 (severe) ([facility])          Disposition  Patient's disposition: Admit to telemetry  Patient's condition is stable.        David Grant [facility] [place], DO  09/28/21 4054 [facility],   09/28/21 1586

## 2021-09-28 NOTE — SIGNIFICANT EVENT
We received a nurse to nurse on [facility] [address] and they said that her chest tesio was accessed. I perfect served Dr. Danial Cranker to make sure that we had clearance from nephrology to be using that site and he said that it should only be used for dialysis except in an emergency situation. The ED and nursing supervisor were notified.

## 2021-09-28 NOTE — CONSULTS
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 She was minimally responsive and hypoxemic. She has been admitted with a diagnosis of possible sepsis. She was given vancomycin and piperacillin-tazobactam in the ER. Patient reports having intermittent nausea and vomiting but denies any diarrhea, fever or chills.       Past Medical History:        Diagnosis Date    Acute congestive heart failure ([address]     BC (acute kidney injury) ([address] 10/01/2019    Cardiac arrest ([address] 02/15/2021    Cephalgia 10/09/2019    Chronic kidney disease     Depression     Diabetes mellitus ([address]     Diabetic gastroparesis associated with type 1 diabetes mellitus ([address] 12/17/2018    Diabetic ketoacidosis ([address] 08/27/2011    Diabetic ketoacidosis with coma associated with type 1 diabetes mellitus ([address] 06/26/2013    Diabetic polyneuropathy associated with type 1 diabetes mellitus ([address] 12/27/2020    Drug use complicating pregnancy in third trimester     Endocarditis 10/31/2020    ESRD (end stage renal disease) ([address] 09/29/2020    H/O cardiovascular stress test 08/27/2021    Lexiscan    Hemod[facility] patient [facility])     History of blood transfusion 2019    Hyperlipidemia 10/08/2020    Hyperosmolar hyperglycemic state (HHS) ([facility] 2020    Hypothyroidism 10/08/2020    Iron deficiency anemia 10/01/2019    MDRO (multiple drug resistant organisms) resistance     MRSA (methicillin resistant Staphylococcus aureus)     back wound abcess    Non compliance w medication regimen 2016    Other disorders of kidney and ureter     Pregnancy 2016    16 weeks    Previous  delivery affecting pregnancy, antepartum 2017    Previous stillbirth or demise, antepartum 2016    Seizure ([facility] 2020    Severe pre-eclampsia in third trimester 2016    Shock liver 02/15/2021    Valvular endocarditis 11/10/2020    This Diagnosis was added to the Problem List based on transcribed orders from Dr. Gabriele Shah        Past Surgical History:        Procedure Laterality Date    BACK SURGERY      abscess   [address]      x2    CHOLECYSTECTOMY, LAPAROSCOPIC N/A 2019    CHOLECYSTECTOMY LAPAROSCOPIC performed by Velasquez Wright MD at [facility] N/A 2018    COLONOSCOPY WITH BIOPSY performed by Alfred Ding MD at [address] N/A 2018    COLONOSCOPY WITH BIOPSY performed by Aditya Lassiter MD at [id number] [place] [address] ECHO COMPL W [address]  2012    EF 57%    ECHO COMPL W DOP COLOR FLOW  6/10/2013         EMBOLECTOMY N/A 2020    [address], VEGECTOMY, YULISSA -- REQS ROOM 3 performed by Pau Chan MD at [address] [place] [address] Left 2021    LEFT LEG INCISION AND DRAINAGE, DEBRIDEMENT, WOUND VAC APPLICATION performed by Maykel Griffin DPM at [address] [place] [address] Left 2021    LEFT LEG DEBRIDEMENT BIOPSY POSS APPLICATION WOUND VAC performed by Maykel Griffin DPM at [place] [address] N/A 2020    LAPAROSCOPIC INSERTION PERITONEAL DIALYSIS CATHETER performed by Ayaan Hinton MD at [place] [address] ESOPHAGOGASTRODUODENOSCOPY TRANSORAL DIAGNOSTIC N/A 5/30/2018    EGD ESOPHAGOGASTRODUODENOSCOPY performed by Vikas Ware MD at [address] [place] [address] TRANSESOPHAGEAL ECHOCARDIOGRAM N/A 10/19/2020    TRANSESOPHAGEAL ECHOCARDIOGRAM WITH BUBBLE STUDY performed by Jeannie Gomez MD at [address] [place] [address] TUNNELED VENOUS PORT PLACEMENT  04/2018    UPPER GASTROINTESTINAL ENDOSCOPY  12/18/2018    EGD BIOPSY performed by Elias Caputo MD at [facility] N/A 10/11/2019    EGD ESOPHAGOGASTRODUODENOSCOPY performed by Kobe Dunbar DO at [facility] N/A 7/14/2021    EGD BIOPSY performed by Tierney Izaguirre MD at [facility] ENDOSCOPY     Current Medications:    Current Facility-Administered Medications: ARIPiprazole (ABILIFY) tablet 5 mg, 5 mg, Oral, Nightly  bumetanide (BUMEX) tablet 2 mg, 2 mg, Oral, BID  folic acid (FOLVITE) tablet 1 mg, 1 mg, Oral, Daily  hydrOXYzine (ATARAX) tablet 25 mg, 25 mg, Oral, Daily  insulin glargine (LANTUS) injection vial 10 Units, 10 Units, SubCUTAneous, BID  levothyroxine (SYNTHROID) tablet 75 mcg, 75 mcg, Oral, Daily  metoclopramide (REGLAN) tablet 5 mg, 5 mg, Oral, TID  metoprolol tartrate (LOPRESSOR) tablet 25 mg, 25 mg, Oral, BID  mirtazapine (REMERON) tablet 15 mg, 15 mg, Oral, Nightly  [START ON 9/29/2021] pantoprazole (PROTONIX) tablet 40 mg, 40 mg, Oral, QAM AC  rOPINIRole (REQUIP) tablet 0.25 mg, 0.25 mg, Oral, Nightly  sevelamer (RENVELA) tablet 800 mg, 800 mg, Oral, TID WC  sodium chloride flush 0.9 % injection 5-40 mL, 5-40 mL, IntraVENous, 2 times per day  sodium chloride flush 0.9 % injection 5-40 mL, 5-40 mL, IntraVENous, PRN  0.9 % sodium chloride infusion, 25 mL, IntraVENous, PRN  acetaminophen (TYLENOL) tablet 650 mg, 650 mg, Oral, Q6H PRN **OR** acetaminophen (TYLENOL) suppository 650 mg, 650 mg, Rectal, Q6H PRN  piperacillin-tazobactam (ZOSYN) 2,250 mg in dextrose 5 % 50 mL IVPB (mini-bag), 2,250 mg, IntraVENous, Q8H  glucose (GLUTOSE) 40 % oral gel 15 g, 15 g, Oral, PRN  dextrose 50 % IV solution, 12.5 g, IntraVENous, PRN  glucagon (rDNA) injection 1 mg, 1 mg, IntraMUSCular, PRN  dextrose 5 % solution, 100 mL/hr, IntraVENous, PRN  insulin lispro (HUMALOG) injection vial 3 Units, 3 Units, SubCUTAneous, TID WC  insulin lispro (HUMALOG) injection vial 0-6 Units, 0-6 Units, SubCUTAneous, TID WC  insulin lispro (HUMALOG) injection vial 0-3 Units, 0-3 Units, SubCUTAneous, Nightly  [START ON 9/29/2021] epoetin nena-epbx (RETACRIT) injection 3,000 Units, 3,000 Units, SubCUTAneous, Once per day on Mon Wed Fri  ondansetron (ZOFRAN-ODT) disintegrating tablet 4 mg, 4 mg, Oral, Q8H PRN **OR** ondansetron (ZOFRAN) injection 4 mg, 4 mg, IntraVENous, Q6H PRN  polyethylene glycol (GLYCOLAX) packet 17 g, 17 g, Oral, Daily PRN  acetaminophen (TYLENOL) tablet 650 mg, 650 mg, Oral, Q6H PRN **OR** acetaminophen (TYLENOL) suppository 650 mg, 650 mg, Rectal, Q6H PRN  heparin (porcine) injection 5,000 Units, 5,000 Units, SubCUTAneous, BID  Allergies:  Cefepime and Toradol [ketorolac tromethamine]    Social History:    TOBACCO:   reports that she quit smoking about 7 months ago. Her smoking use included cigarettes. She has a 3.00 pack-year smoking history. She has never used smokeless tobacco.  ETOH:   reports no history of alcohol use.     Family History:       Problem Relation Age of Onset   Nick Asthma Mother     Hypertension Mother     High Blood Pressure Mother     Diabetes Mother     Asthma Brother     High Blood Pressure Father      REVIEW OF SYSTEMS:    CONSTITUTIONAL:  positive for  fatigue and malaise  EYES:  negative  HEENT:  negative for  hearing loss and epistaxis  RESPIRATORY:  positive for dyspnea  CARDIOVASCULAR:  negative for  chest pain, dyspnea  GASTROINTESTINAL:  positive for nausea  GENITOURINARY:  negative  INTEGUMENT/BREAST:  negative  HEMATOLOGIC/LYMPHATIC:  negative  ALLERGIC/IMMUNOLOGIC:  positive for drug reactions  ENDOCRINE:  positive for weight changes    MUSCULOSKELETAL:  negative  NEUROLOGICAL:  negative        PHYSICAL EXAM:      Vitals:    VITALS:  /88   Pulse 108   Temp 99.9 °F (37.7 °C) (Bladder)   Resp 20   Ht 5' 4\" (1.626 m)   Wt 152 lb 7 oz (69.1 kg)   LMP  (LMP Unknown)   SpO2 100%   BMI 26.17 kg/m²   24HR INTAKE/OUTPUT:      Intake/Output Summary (Last 24 hours) at 9/28/2021 1443  Last data filed at 9/28/2021 1151  Gross per 24 hour   Intake    Output 100 ml   Net -100 ml       PD Catheter Exam:  PD catheter exit site clean    Constitutional: Awake in no acute distress  HEENT:  Normocephalic, PERRL  Respiratory: Decreased breath sounds on the basis  Cardiovascular/Edema:  RRR, S1/S2  Gastrointestinal:  Soft, PD catheter exit site clean   Neurologic:  Nonfocal, AVELAR  Skin:  Warm, dry, no lesions  Other:  no edema     DATA:    CBC:   Lab Results   Component Value Date    WBC 15.3 09/28/2021    RBC 2.73 09/28/2021    HGB 8.3 09/28/2021    HCT 25.6 09/28/2021    MCV 93.8 09/28/2021    MCH 30.4 09/28/2021    MCHC 32.4 09/28/2021    RDW 14.9 09/28/2021     09/28/2021    MPV 10.7 09/28/2021     CMP:    Lab Results   Component Value Date     09/28/2021    K 4.1 09/28/2021    K 4.0 08/26/2021    CL 93 09/28/2021    CO2 34 09/28/2021    BUN 14 09/28/2021    CREATININE 5.1 09/28/2021    GFRAA 12 09/28/2021    LABGLOM 12 09/28/2021    GLUCOSE 228 09/28/2021    GLUCOSE 130 05/18/2012    PROT 4.9 09/28/2021    LABALBU 2.0 09/28/2021    LABALBU 4.1 05/18/2012    CALCIUM 7.7 09/28/2021    BILITOT <0.2 09/28/2021    ALKPHOS 213 09/28/2021    AST 13 09/28/2021    ALT 11 09/28/2021     Magnesium:    Lab Results   Component Value Date    MG 1.7 09/28/2021     Phosphorus:    Lab Results   Component Value Date    PHOS 5.9 09/14/2021     Radiology Review:         CXR 9/08/2021   No acute cardiopulmonary process.               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 She was minimally responsive and hypoxemic. She has been admitted with a diagnosis of possible sepsis. She was given vancomycin and piperacillin-tazobactam in the ER. Patient reports having intermittent nausea and vomiting but denies any diarrhea, fever or chills. 1. ESRD on home HD dialysis training, to continue HD 3 times a week while in the hospital.  2. Metabolic alkalosis (pH 9.863, bicarb level 34) due to combination of vomiting and dialysis, with respiratory compensation  3. HTN, on metoprolol  4. MBD of CKD, on sevelamer   5. Anemia of CKD, on epoetin alpha    6. Restless leg syndrome, on ropinirole  -------------------------------------------------------------------  5. Altered mental status, resolved, hypoglycemia? 6. Possible sepsis, procalcitonin level 1.33, has received piperacillin-tazobactam and vancomycin  7. History of gastroparesis, on metoclopramide    Plan:    · HD tomorrow and 3 times a week MWF  · Monitor daily labs  · Albumin 25 g IV every 8 hours x2 days  · Epoetin alpha 5000 units 3 times a week    Thank you Dr. Anat García for allowing us to participate in the care of Ms.  [address]    Electronically signed by Tamara Junior MD on 9/28/2021 at 2:43 PM

## 2021-09-28 NOTE — ED NOTES
The patient opens eyes when her name is called, she took tylenol without difficulty     Diana Hubbard RN  09/28/21 4601

## 2021-09-28 NOTE — CONSULTS
[facility]  Consult Note    [address] [address]  [place], [address]  Phone [phone]   [phone]      Admit Date: 2021  9:36 AM  Pt Name: Mira Narayan  MRN: [id number]  : 1990  Reason for Consult:    Chief Complaint   Patient presents with    Altered Mental Status     to er via ems from home unresponsive. last known well was 0100 today per ems     Requesting Physician:  Jacinto Darling MD  PCP: Milla Gabriel MD  History Obtained From:  patient, chart   ID consulted for Sepsis [facility]) [A41.9]  ESRD (end stage renal disease) ([address] [N18.6]  on hospital day 0  CHIEF COMPLAINT       Chief Complaint   Patient presents with    Altered Mental Status     to er via ems from home unresponsive.  last known well was 0100 today per ems     HISTORYOF PRESENT ILLNESS   Mira Narayan is a 36 y.o. female who presents with   has a past medical history of Acute congestive heart failure ([address] BC (acute kidney injury) ([address] Cardiac arrest ([address] Cephalgia, Chronic kidney disease, Depression, Diabetes mellitus ([address] Diabetic gastroparesis associated with type 1 diabetes mellitus ([address] Diabetic ketoacidosis ([address] Diabetic ketoacidosis with coma associated with type 1 diabetes mellitus ([address] Diabetic polyneuropathy associated with type 1 diabetes mellitus ([address] Drug use complicating pregnancy in third trimester, Endocarditis, ESRD (end stage renal disease) ([address] H/O cardiovascular stress test, Hemodialysis patient ([address] History of blood transfusion, Hyperlipidemia, Hyperosmolar hyperglycemic state (HHS) ([address] Hypothyroidism, Iron deficiency anemia, MDRO (multiple drug resistant organisms) resistance, MRSA (methicillin resistant Staphylococcus aureus), Non compliance w medication regimen, Other disorders of kidney and ureter, Pregnancy, Previous  delivery affecting pregnancy, antepartum, Previous stillbirth or demise, antepartum, Seizure [facility]), Severe pre-eclampsia in third trimester, Shock liver, and Valvular endocarditis. ED TRIAGEVITALS  BP: 136/88, Temp: 99.9 °F (37.7 °C), Pulse: 108, Resp: 20, SpO2: 100 %  HPI  Pt was recently d/c on 9/15 after being rx for hyperglycemia   She was transitioned from PD to [facility] AT [facility] cath  Patient had left foot pain and was found to have fractures of 2nd-4th proximal phalanges and was seen by podiatry and fit in a surgical shoe. Pt comes in now with change of MS, o2 at 80%  Wbc15.3 hgb8.3 cr5.1  ua neg   Admitted with sepsis  received piptazo/vanco   PT WAS SEEN IN ER LETHARGIC BUT ABLE TO RESPOND SOME  HA RTDC DRESSING NOT CLEAN   HAS DORAN YELLOW URINE CLEAR   NO ABD PAIN NP RASH/WOUNDS  NO DIARRHEA    REVIEW OF SYSTEMS     CONSTITUTIONAL:   No fever, chills, weight loss  ALLERGIES:    No urticaria, hay fever,    EYES:     No blurry vision, loss of vision, eye pain  ENT:      No hearing loss, sore throat  CARDIOVASCULAR:   No chest pain or palpitations  RESPIRATORY:   No cough, sob  ENDOCRINE:    No increase thirst, urination   HEME-LYMPH:   No easy bruising or bleeding  GI:     No nausea, vomiting or diarrhea  :     No urinary complaints  NEURO:    No seizures, stroke, HA  MUSCULOSKELETAL:  No muscle aches or pain, no joint pain  SKIN:     No rash or itch  PSYCH:    No depression or anxiety    Not in a hospital admission.   CURRENT MEDICATIONS     Current Facility-Administered Medications:     ARIPiprazole (ABILIFY) tablet 5 mg, 5 mg, Oral, Nightly, Marian Herrera MD    [facility]) tablet 2 mg, 2 mg, Oral, BID, Marian Herrera MD    folic acid (FOLVITE) tablet 1 mg, 1 mg, Oral, Daily, Marian Herrera MD    hydrOXYzine (ATARAX) tablet 25 mg, 25 mg, Oral, Daily, Marian Herrera MD    insulin glargine (LANTUS) injection vial 10 Units, 10 Units, SubCUTAneous, BID, Marian Herrera MD    levothyroxine (SYNTHROID) tablet 75 mcg, 75 mcg, Oral, Daily, MD Allegra Mclaughlin metoclopramide (REGLAN) tablet 5 mg, 5 mg, Oral, TID, Yarelis Sams MD    metoprolol tartrate (LOPRESSOR) tablet 25 mg, 25 mg, Oral, BID, Yarelis Sams MD    mirtazapine (REMERON) tablet 15 mg, 15 mg, Oral, Nightly, Yarelis Sams MD  Raghav Clancy  [START ON 9/29/2021] pantoprazole (PROTONIX) tablet 40 mg, 40 mg, Oral, QAM AC, Yarelis Sams MD    rOPINIRole (REQUIP) tablet 0.25 mg, 0.25 mg, Oral, Nightly, Yarelis Sams MD    sevelamer (RENVELA) tablet 800 mg, 800 mg, Oral, TID , Yarelis Sams MD    sodium chloride flush 0.9 % injection 5-40 mL, 5-40 mL, IntraVENous, 2 times per day, Yarelis Sams MD    sodium chloride flush 0.9 % injection 5-40 mL, 5-40 mL, IntraVENous, PRN, Yarelis Sams MD    0.9 % sodium chloride infusion, 25 mL, IntraVENous, PRN, Yarelis Sams MD    acetaminophen (TYLENOL) tablet 650 mg, 650 mg, Oral, Q6H PRN **OR** acetaminophen (TYLENOL) suppository 650 mg, 650 mg, Rectal, Q6H PRN, Yarelis Sams MD    piperacillin-tazobactam (ZOSYN) 2,250 mg in dextrose 5 % 50 mL IVPB (mini-bag), 2,250 mg, IntraVENous, Q8H, Yarelis Sams MD    glucose (GLUTOSE) 40 % oral gel 15 g, 15 g, Oral, PRN, Yarelis Sams MD    dextrose 50 % IV solution, 12.5 g, IntraVENous, PRN, Yarelis Sams MD    glucagon (rDNA) injection 1 mg, 1 mg, IntraMUSCular, PRN, Yarelis Sams MD    dextrose 5 % solution, 100 mL/hr, IntraVENous, PRN, Yarelis Sams MD    insulin lispro (HUMALOG) injection vial 3 Units, 3 Units, SubCUTAneous, TID WC, Yarelis Sams MD    insulin lispro (HUMALOG) injection vial 0-6 Units, 0-6 Units, SubCUTAneous, TID WC, Yarelis Sams MD    insulin lispro (HUMALOG) injection vial 0-3 Units, 0-3 Units, SubCUTAneous, Nightly, MD Raghav Luu  [START ON 9/29/2021] epoetin nena-epbx (RETACRIT) injection 3,000 Units, 3,000 Units, SubCUTAneous, Once per day on Mon Wed Fri, Yarelis Sams MD    ondansetron (ZOFRAN-ODT) disintegrating tablet 4 mg, 4 mg, Oral, Q8H PRN **OR** ondansetron (ZOFRAN) injection 4 mg, 4 mg, IntraVENous, Q6H PRN, Pippa Pickett MD    polyethylene glycol (GLYCOLAX) packet 17 g, 17 g, Oral, Daily PRN, Pippa Pickett MD    acetaminophen (TYLENOL) tablet 650 mg, 650 mg, Oral, Q6H PRN **OR** acetaminophen (TYLENOL) suppository 650 mg, 650 mg, Rectal, Q6H PRN, Pippa Pickett MD    heparin (porcine) injection 5,000 Units, 5,000 Units, SubCUTAneous, BID, Pippa Pickett MD    Current Outpatient Medications:     insulin glargine (LANTUS) 100 UNIT/ML injection vial, Inject 10 Units into the skin 2 times daily, Disp: 10 mL, Rfl: 3    gentamicin (GARAMYCIN) 0.1 % cream, Apply topically 3 times daily. , Disp: 15 g, Rfl: 0    vitamin D (ERGOCALCIFEROL) 1.25 MG (63584 UT) CAPS capsule, Take 1 capsule by mouth once a week, Disp: 5 capsule, Rfl: 0    metoprolol tartrate (LOPRESSOR) 25 MG tablet, Take 25 mg by mouth 2 times daily, Disp: , Rfl:     bumetanide (BUMEX) 1 MG tablet, Take 2 mg by mouth 2 times daily, Disp: , Rfl:     pregabalin (LYRICA) 25 MG capsule, Take 1 capsule by mouth daily for 30 days. , Disp: 30 capsule, Rfl: 0    sevelamer (RENVELA) 800 MG tablet, Take 1 tablet by mouth 3 times daily (with meals) New lower dose, Disp: 90 tablet, Rfl: 3    insulin lispro, 1 Unit Dial, (HUMALOG KWIKPEN) 100 UNIT/ML SOPN, Inject 3 units with meals + sliding scale. MAX 30U/day, Disp: 10 pen, Rfl: 3    Insulin Pen Needle (BD PEN NEEDLE NAV U/F) 32G X 4 MM MISC, Uses with insulin 4 times a day, Disp: 250 each, Rfl: 5    blood glucose monitor strips, Freestyle Lite Strips.  Checks 4 times/day before meals and at bedtime and as needed for symptoms of irregular blood glucose., Disp: 250 strip, Rfl: 5    folic acid (FOLVITE) 1 MG tablet, Take 1 tablet by mouth daily, Disp: 30 tablet, Rfl: 3    mirtazapine (REMERON) 15 MG tablet, Take 15 mg by mouth nightly, Disp: , Rfl:     hydrOXYzine (ATARAX) 25 MG tablet, Take 25 mg by mouth daily, Disp: , Rfl:     polyethylene glycol (GLYCOLAX) 17 g packet, Take 17 g by mouth daily as needed for Constipation, Disp: , Rfl:     ARIPiprazole (ABILIFY) 5 MG tablet, Take 5 mg by mouth nightly, Disp: , Rfl:     Glucagon, rDNA, (GLUCAGON EMERGENCY IJ), Inject as directed, Disp: , Rfl:     glucose (GLUTOSE) 40 % GEL, Take 15 g by mouth See Admin Instructions, Disp: , Rfl:     pantoprazole (PROTONIX) 40 MG tablet, Take 1 tablet by mouth every morning (before breakfast), Disp: 30 tablet, Rfl: 3    levothyroxine (SYNTHROID) 75 MCG tablet, TAKE 1 TABLET BY MOUTH IN THE MORNING BEFORE BREAKFAST, Disp: 60 tablet, Rfl: 0    epoetin nena-epbx (RETACRIT) 3000 UNIT/ML SOLN injection, Inject 1 mL into the skin three times a week, Disp: 21.9 mL, Rfl:     rOPINIRole (REQUIP) 0.25 MG tablet, Take 0.25 mg by mouth nightly, Disp: , Rfl:     blood glucose test strips (CONTOUR NEXT TEST) strip, 1 each by In Vitro route 5 times daily As needed. , Disp: 150 each, Rfl: 5    metoclopramide (REGLAN) 5 MG tablet, Take 5 mg by mouth 3 times daily , Disp: , Rfl:   ALLERGIES     Cefepime and Toradol [ketorolac tromethamine]  Immunization History   Administered Date(s) Administered    Influenza Virus Vaccine 10/22/2011, 10/23/2012    Influenza, Quadv, 6 mo and older, IM, PF (Flulaval, Fluarix) 12/15/2018    Influenza, Quadv, IM, PF (6 mo and older Fluzone, Flulaval, Fluarix, and 3 yrs and older Afluria) 03/16/2017, 09/29/2017    Tdap (Boostrix, Adacel) 07/19/2016, 08/26/2016, 06/24/2017      Internal Administration   First Dose      Second Dose           Last COVID Lab SARS-CoV-2 (no units)   Date Value   06/22/2020 Not Detected     SARS-CoV-2, PCR (no units)   Date Value   07/10/2021 Not Detected     SARS-CoV-2, NAAT (no units)   Date Value   09/28/2021 Not Detected            PAST MEDICAL HISTORY     Past Medical History:   Diagnosis Date    Acute congestive heart failure [facility]     BC (acute kidney injury) ([address] 10/01/2019    Cardiac arrest ([address] 02/15/2021    Cephalgia 10/09/2019    Chronic kidney disease     Depression     Diabetes mellitus ([address]     Diabetic gastroparesis associated with type 1 diabetes mellitus ([address] 2018    Diabetic ketoacidosis ([address] 2011    Diabetic ketoacidosis with coma associated with type 1 diabetes mellitus ([address] 2013    Diabetic polyneuropathy associated with type 1 diabetes mellitus ([address] 2020    Drug use complicating pregnancy in third trimester     Endocarditis 10/31/2020    ESRD (end stage renal disease) ([address] 2020    H/O cardiovascular stress test 2021    Lexiscan    Hemod[facility] patient [facility])     History of blood transfusion 2019    Hyperlipidemia 10/08/2020    Hyperosmolar hyperglycemic state (HHS) ([address] 2020    Hypothyroidism 10/08/2020    Iron deficiency anemia 10/01/2019    MDRO (multiple drug resistant organisms) resistance     MRSA (methicillin resistant Staphylococcus aureus)     back wound abcess    Non compliance w medication regimen 2016    Other disorders of kidney and ureter     Pregnancy 2016    16 weeks    Previous  delivery affecting pregnancy, antepartum 2017    Previous stillbirth or demise, antepartum 2016    Seizure ([address] 2020    Severe pre-eclampsia in third trimester 2016    Shock liver 02/15/2021    Valvular endocarditis 11/10/2020    This Diagnosis was added to the Problem List based on transcribed orders from Dr. Barrett Cline       Past Surgical History:   Procedure Laterality Date    BACK SURGERY      abscess     SECTION      x2    CHOLECYSTECTOMY, LAPAROSCOPIC N/A 2019    CHOLECYSTECTOMY LAPAROSCOPIC performed by Padmini Wren MD at [facility] N/A 2018    COLONOSCOPY WITH BIOPSY performed by Iqra Bauer MD at [address] [address] N/A 2018    COLONOSCOPY WITH BIOPSY performed by Eda Martin MD at [address]  1/31/2012    EF 57%    ECHO COMPL W DOP COLOR FLOW  6/10/2013         EMBOLECTOMY N/A 11/6/2020    [facility], [id number] [place] [address], YULISSA -- REQS ROOM 3 performed by Ying Matt MD at [address] [address] Left 2/23/2021    LEFT LEG INCISION AND DRAINAGE, DEBRIDEMENT, WOUND VAC APPLICATION performed by Traci Green DPM at [address] [address] Left 5/18/2021    LEFT LEG DEBRIDEMENT BIOPSY POSS APPLICATION WOUND VAC performed by Traci Green DPM at [address] [place] [address] N/A 6/26/2020    LAPAROSCOPIC INSERTION PERITONEAL DIALYSIS CATHETER performed by Vu Douglass MD at [facility] ESOPHAGOGASTRODUODENOSCOPY TRANSORAL DIAGNOSTIC N/A 5/30/2018    EGD ESOPHAGOGASTRODUODENOSCOPY performed by Padmaja Adams MD at [id number] [place] [address] TRANSESOPHAGEAL ECHOCARDIOGRAM N/A 10/19/2020    TRANSESOPHAGEAL ECHOCARDIOGRAM WITH BUBBLE STUDY performed by Ronna Yi MD at [id number] [place] [address] TUNNELED VENOUS PORT PLACEMENT  04/2018    UPPER GASTROINTESTINAL ENDOSCOPY  12/18/2018    EGD BIOPSY performed by Eda Martin MD at [address] [place] [address] N/A 10/11/2019    EGD ESOPHAGOGASTRODUODENOSCOPY performed by Roger Taylor DO at [address] [place] [address] N/A 7/14/2021    EGD BIOPSY performed by Michelle Anderson MD at [address]       Family History   Problem Relation Age of Onset    Asthma Mother     Hypertension Mother     High Blood Pressure Mother     Diabetes Mother     Asthma Brother     High Blood Pressure Father      SOCIAL HISTORY       Social History     Socioeconomic History    Marital status: Single     Spouse name: None    Number of children: 2    Years of education: None    Highest education level: None   Occupational History    None   Tobacco Use    Smoking status: Former Smoker     Packs/day: 0.50 Years: 6.00     Pack years: 3.00     Types: Cigarettes     Quit date: 2021     Years since quittin.6    Smokeless tobacco: Never Used    Tobacco comment: vaper cig   Vaping Use    Vaping Use: Never used   Substance and Sexual Activity    Alcohol use: No    Drug use: No     Comment: documented prior history of opioid abuse    Sexual activity: Yes     Partners: Male   Other Topics Concern    None   Social History Narrative    None     Social Determinants of Health     Financial Resource Strain:     Difficulty of Paying Living Expenses:    Food Insecurity:     Worried About Running Out of Food in the Last Year:     Ran Out of Food in the Last Year:    Transportation Needs:     Lack of Transportation (Medical):      Lack of Transportation (Non-Medical):    Physical Activity:     Days of Exercise per Week:     Minutes of Exercise per Session:    Stress:     Feeling of Stress :    Social Connections:     Frequency of Communication with Friends and Family:     Frequency of Social Gatherings with Friends and Family:     Attends [employer] Services:     Active Member of Clubs or Organizations:     Attends Club or Organization Meetings:     Marital Status:    Intimate Partner Violence:     Fear of Current or Ex-Partner:     Emotionally Abused:     Physically Abused:     Sexually Abused:      PHYSICAL EXAM       ED Triage Vitals [21 0948]   BP Temp Temp Source Pulse Resp SpO2 Height Weight   (!) 134/91 101 °F (38.3 °C) Oral 120 20 90 % 5' 4\" (1.626 m) 152 lb 7 oz (69.1 kg)     Vitals:    Vitals:    21 0948 21 1143   BP: (!) 134/91 136/88   Pulse: 120 108   Resp: 20 20   Temp: 101 °F (38.3 °C) 99.9 °F (37.7 °C)   TempSrc: Oral Bladder   SpO2: 90% 100%   Weight: 152 lb 7 oz (69.1 kg)    Height: 5' 4\" (1.626 m)      Physical Exam   Constitutional/General: AROUSABLE  NAD PT LOOKS SWOLLEN  Head: NC/AT  Eyes: PERRL, EOMI  Mouth: Normal mucosa, no thrush   Neck: Supple, full ROM, Pulmonary: Lungs DEC  to auscultation bilaterally. Not in respiratory distress  Cardiovascular:  Regular rate and rhythm, no murmurs, gallops, or rubs. Abdomen: Soft, + BS. No distension. Nontender. no  PAIN  Pd cath   Extremities: Moves all extremities x 4. Warm and well perfused EDEMA  Pulses:  Distal pulses intact  Skin: Warm and dry without rash  Neurologic:    No focal deficits  RTDC ? TENDER HAS OLD SCAR AROUND IT      DIAGNOSTIC RESULTS   RADIOLOGY:   XR FOOT LEFT (MIN 3 VIEWS)    Result Date: 9/10/2021  EXAMINATION: THREE XRAY VIEWS OF THE LEFT FOOT 9/10/2021 4:26 pm COMPARISON: 03/23/2020 HISTORY: ORDERING SYSTEM PROVIDED HISTORY: Attention to base of 2nd and 3rd toes. TECHNOLOGIST PROVIDED HISTORY: Reason for exam:->Attention to base of 2nd and 3rd toes. FINDINGS: Fractures involving the proximal portions of the proximal phalanx 2nd through 5th digits of unknown acuity. There is normal alignment of the tarsometatarsal joints. No acute joint abnormality. No focal osseous lesion. No focal soft tissue abnormality. Fractures proximal portions of the proximal phalanx of the 2nd through 5th digits of unknown acuity. CT HEAD WO CONTRAST    Result Date: 9/28/2021  EXAMINATION: CT OF THE HEAD WITHOUT CONTRAST  9/28/2021 11:05 am TECHNIQUE: CT of the head was performed without the administration of intravenous contrast. Dose modulation, iterative reconstruction, and/or weight based adjustment of the mA/kV was utilized to reduce the radiation dose to as low as reasonably achievable. COMPARISON: CT head 02/15/2021 HISTORY: ORDERING SYSTEM PROVIDED HISTORY: ams TECHNOLOGIST PROVIDED HISTORY: Has a \"code stroke\" or \"stroke alert\" been called? ->No Reason for exam:->ams Decision Support Exception - unselect if not a suspected or confirmed emergency medical condition->Emergency Medical Condition (MA) FINDINGS: There are no areas of abnormal attenuation within the brain parenchyma.   No evidence of mass, mass effect, or midline shift. The ventricles and sulci are of normal size and configuration. No extra-axial fluid collections or acute hemorrhage. The gray-white differentiation appears preserved without evidence of acute cortical ischemia. The calvarium is intact. There is minimal mucosal thickening within the visualized portions of the maxillary sinuses. The remaining visualized paranasal sinuses and mastoid air cells are clear. No acute intracranial abnormality. IR FLUORO GUIDED CVA DEVICE PLMT/REPLACE/REMOVAL    Addendum Date: 9/21/2021    ADDENDUM: No addendum required for billing. It states in the report that a image was stored. Result Date: 9/21/2021  PROCEDURE: ULTRASOUND GUIDED VASCULAR ACCESS. FLUOROSCOPY GUIDED Tunneled dialysis catheter placement 9/15/2021. HISTORY: ORDERING SYSTEM PROVIDED HISTORY: TDC placement TECHNOLOGIST PROVIDED HISTORY: Reason for exam:->TDC placement SEDATION: The administration, documentation and monitoring of IV conscious sedation was under my direct supervision for time frame of 25 minutes. 1 mg of IV Versed and 50 mcg of IV fentanyl was administered. Radiology nursing staff monitored the patient throughout the entire examination. FLUOROSCOPY DOSE AND TYPE OR TIME AND EXPOSURES: Fluoroscopy time equals 0.9 minutes. Total dose equals 12 mGy. TECHNIQUE: Informed consent was obtained after a detailed explanation of the procedure including risks, benefits, and alternatives. Universal protocol was observed. The right arm was prepped and draped in sterile fashion using maximum sterile barrier technique. Local anesthesia was achieved with lidocaine. A micropuncture needle was used to access the right brachial vein using ultrasound guidance. An ultrasound image demonstrating patency of the vein with needle tip located within it. An image was obtained and stored in PACs.  A 0.018 guidewire was used to place a peel-a-way sheath and a  PICC was advanced with fluoroscopic guidance with the tip at the cavo-atrial junction. The catheter flushed easily and there was a good blood return. The catheter was secured to the skin. The patient tolerated the procedure well and there were no immediate complications. FINDINGS: The patient's neck was prepped and draped in a sterile fashion using maximum sterile barrier technique. Ultrasound images demonstrate a patent right internal jugular vein. Following the uneventful administration of lidocaine using ultrasound guidance I introduced a micro puncture needle into the right internal jugular vein. Through the micro needle a microwire was advanced. Over the microwire a micro sheath was placed. Through the micro sheath an Amplatz wire was advanced into the superior vena cava. 2 dilators were used to dilate a tract into the right internal jugular vein. I then anesthetized a tract along the chest wall measuring 10 cm. Using a blunt tunneling device I tunneled the catheter to the dilator within the right internal jugular vein. Over the Amplatz wire pill away sheath was placed. Through the peel-away sheath the dual lumen 15 Yoruba 28 cm dialysis catheter was placed. The catheter tip is positioned at the SVC right atrial junction. The patient tolerated the procedure well and there were no complications. The catheter was then secured to the skin with 2 0 silk suture. The incision overlying the right internal jugular vein was also sutured with 2 0 silk suture. Successful placement of a tunneled dialysis catheter via the right internal jugular vein. Administration of conscious sedation.      XR CHEST PORTABLE    Result Date: 9/28/2021  EXAMINATION: ONE XRAY VIEW OF THE CHEST 9/28/2021 7:06 am COMPARISON: One-view chest x-ray 09/08/2021 HISTORY: ORDERING SYSTEM PROVIDED HISTORY: ams TECHNOLOGIST PROVIDED HISTORY: Reason for exam:->ams FINDINGS: There is mild enlargement of the cardiac silhouette, which may be exaggerated by AP technique. The mediastinal contours are within normal limits. A large caliber right internal jugular central venous catheter terminates in the region of the right atrium. The lungs are mildly hypoinflated. There are basilar predominant linear/hazy opacities, right greater than left. No significant pleural effusions or pneumothorax. No osseous abnormality is identified. Surgical clips are present in the right upper quadrant. Basilar predominant linear/hazy opacities likely represent a component of atelectasis. Pulmonary edema or atypical inflammation would be additional considerations. XR CHEST PORTABLE    Result Date: 9/8/2021  EXAMINATION: ONE XRAY VIEW OF THE CHEST 9/8/2021 1:18 am COMPARISON: August 25, 2021. HISTORY: ORDERING SYSTEM PROVIDED HISTORY: SOB, normal lung exam TECHNOLOGIST PROVIDED HISTORY: Reason for exam:->SOB, normal lung exam FINDINGS: Frontal portable view of the chest.  Normal lung volume. No focal airspace disease. Normal pulmonary vasculature. No pleural effusion or pneumothorax. Stable cardiomediastinal silhouette and great vessels. Multiple old bilateral rib fracture deformities. .     No acute cardiopulmonary process. US DUP UPPER EXTREMITIES BILATERAL VENOUS    Result Date: 9/15/2021  EXAMINATION: DUPLEX ULTRASOUND OF THE BILATERAL UPPER EXTREMITIES FOR DVT, 9/15/2021 10:53 am TECHNIQUE: Duplex ultrasound using B-mode/gray scaled imaging and Doppler spectral analysis and color flow was obtained of the deep venous structures of the upper extremity. COMPARISON: None. HISTORY: ORDERING SYSTEM PROVIDED HISTORY: Jugular vein thrombosis TECHNOLOGIST PROVIDED HISTORY: Reason for exam:->Jugular vein thrombosis What reading provider will be dictating this exam?->CRC FINDINGS: There is normal flow and compressibility of the visualized venous structures. There is no evidence of echogenic thrombus.  The veins demonstrate good compressibility with normal color flow study and spectral analysis. No evidence of DVT. Bilateral jugular veins are patent. IR ULTRASOUND GUIDANCE VASCULAR ACCESS    Result Date: 9/15/2021  PROCEDURE: ULTRASOUND GUIDED VASCULAR ACCESS. FLUOROSCOPY GUIDED Tunneled dialysis catheter placement 9/15/2021. HISTORY: ORDERING SYSTEM PROVIDED HISTORY: [facility] TECHNOLOGIST PROVIDED HISTORY: Reason for exam:->TDC SEDATION: The administration, documentation and monitoring of IV conscious sedation was under my direct supervision for time frame of 25 minutes. 1 mg of IV Versed and 50 mcg of IV fentanyl was administered. FLUOROSCOPY DOSE AND TYPE OR TIME AND EXPOSURES: Fluoroscopy time equals 0.9 minutes. Total dose equals 12 mGy TECHNIQUE: The examination was performed on the both fluoroscopy and ultrasound guidance. A fluoroscopic and ultrasound image was obtained for records. Sandhya Zhao FINDINGS: The patient's neck was prepped and draped in a sterile fashion using maximum sterile barrier technique. Ultrasound images demonstrate a patent right internal jugular vein. Following the uneventful administration of lidocaine using ultrasound guidance I introduced a micro puncture needle into the right internal jugular vein. Through the micro needle a microwire was advanced. Over the microwire a micro sheath was placed. Through the micro sheath an Amplatz wire was advanced into the superior vena cava. 2 dilators were used to dilate a tract into the right internal jugular vein. I then anesthetized a tract along the chest wall measuring 10 cm. Using a blunt tunneling device I tunneled the catheter to the dilator within the right internal jugular vein. Over the Amplatz wire pill away sheath was placed. Through the peel-away sheath the dual lumen 15 [place] 28 cm dialysis catheter was placed. The catheter tip is positioned at the SVC right atrial junction. The patient tolerated the procedure well and there were no complications.  The catheter was then secured to the skin with 2 0 silk suture. The incision overlying the right internal jugular vein was also sutured with 2 0 silk suture. Successful placement of a tunneled dialysis catheter via the right internal jugular vein. Administration of conscious sedation. LABS  Recent Labs     09/28/21  1000   WBC 15.3*   HGB 8.3*   HCT 25.6*   MCV 93.8        Recent Labs     09/28/21  1000      K 4.1   CL 93*   CO2 34*   BUN 14   CREATININE 5.1*   GFRAA 12   LABGLOM 12   GLUCOSE 228*   PROT 4.9*   LABALBU 2.0*   CALCIUM 7.7*   BILITOT <0.2   ALKPHOS 213*   AST 13   ALT 11     No results for input(s): PROCAL in the last 72 hours.   Lab Results   Component Value Date    CRP 0.9 (H) 08/16/2021    CRP 12.8 (H) 02/22/2021    CRP 2.5 (H) 10/31/2020     Lab Results   Component Value Date    SEDRATE 31 (H) 08/16/2021    SEDRATE 95 (H) 02/22/2021    SEDRATE 35 (H) 10/31/2020     No results found for: [id number]  Lab Results   Component Value Date    COVID19 Not Detected 09/28/2021    COVID19 Not Detected 07/10/2021     COVID-19/ISAIAH-COV2 LABS  Recent Labs     09/28/21  1000   INR 1.1   PROTIME 12.4   AST 13   ALT 11     Lab Results   Component Value Date    CHOL 95 01/14/2020    TRIG 82 01/14/2020    HDL 33 01/14/2020    LDLCALC 46 01/14/2020    LABVLDL 16 01/14/2020         Lab Results   Component Value Date    HEPAIGM Non-Reactive 09/15/2021    HEPBIGM Non-Reactive 09/15/2021    HCVABI Non-Reactive 09/15/2021     Hep C Ab Interp   Date Value Ref Range Status   09/15/2021 Non-Reactive Non-Reactive Final        MICROBIOLOGY:     Cultures :   Lab Results   Component Value Date    BC 5 Days no growth 06/21/2021    BC 5 Days no growth 05/14/2021    BC 5 Days no growth 02/18/2021    ORG Serratia marcescens 07/11/2021    ORG Enterococcus faecium 06/29/2021    ORG Yeast 05/18/2021    ORG Staphylococcus aureus 05/18/2021    ORG Nadine albicans 05/18/2021     Lab Results   Component Value Date    BLOODCULT2 5 Days no growth 06/21/2021    BLOODCULT2 5 Days no growth 05/14/2021    BLOODCULT2 5 Days no growth 02/18/2021    ORG Serratia marcescens 07/11/2021    ORG Enterococcus faecium 06/29/2021    ORG Yeast 05/18/2021    ORG Staphylococcus aureus 05/18/2021    ORG Nadine albicans 05/18/2021       WOUND/ABSCESS   Date Value Ref Range Status   05/16/2021   Final    Heavy growth  Refer to previous culture (CXWND: Leg-Left collected on 5-14-21 at 1426)  for susceptibility results     05/14/2021   Final    Heavy growth  Methicillin resistant Staph aureus isolated. Most Methicillin  resistant Staphylococcus are usually resistant to multiple  antibiotics including other B-Lactams, Aminoglycosides,  Macrolides, Clindamycin and Tetracycline. Contact isolation  is indicated. 02/22/2021 Growth not present  Final       Smear, Respiratory   Date Value Ref Range Status   02/18/2021   Final    Group 6: <25 PMN's/LPF and <25 Epithelial cells/LPF  Rare Polymorphonuclear leukocytes  Rare Epithelial cells  Few Gram positive diplococci  Rare Gram negative rods           Component Value Date/Time    AFBCX No growth after 6 weeks of incubation. 05/18/2021 0727    FUNGSM 1+ Yeast 05/18/2021 0727    LABFUNG  05/18/2021 0727     Moderate growth  No further workup  Unable to identify be conventional methods      LABFUNG No growth after 4 weeks of incubation. 11/06/2020 1142     CULTURE, RESPIRATORY   Date Value Ref Range Status   02/18/2021 Oral Pharyngeal Celine reduced (A)  Final   02/18/2021 Rare growth  Final   02/18/2021 Moderate growth  Final     No results found for: CXCATHTIP  Body Fluid Culture, Sterile   Date Value Ref Range Status   10/20/2020   Final    Neisseria gonorrhoeae not isolated  Growth not present       Culture Surgical   Date Value Ref Range Status   05/18/2021   Final    Heavy growth  Methicillin resistant Staph aureus isolated.  Most Methicillin  resistant Staphylococcus are usually resistant to multiple  antibiotics including other B-Lactams, Aminoglycosides,  Macrolides, Clindamycin and Tetracycline. Contact isolation  is indicated. 05/18/2021 Light growth  Final   02/23/2021 Growth not present  Final     Urine Culture, Routine   Date Value Ref Range Status   07/11/2021 75,000 CFU/ml  Final   06/29/2021   Final    >100,000 CFU/ml  Vancomycin resistant Enterococci isolated     06/21/2021 <10,000 CFU/mL  Mixed gram positive organisms    Final     MRSA Culture Only   Date Value Ref Range Status   12/27/2020 Methicillin resistant Staph aureus not isolated  Final   10/13/2020 Methicillin resistant Staph aureus not isolated  Final   10/30/2019 Methicillin resistant Staph aureus not isolated  Final          FINAL IMPRESSION    Patient is a 36 y.o. female who presented with   Chief Complaint   Patient presents with    Altered Mental Status     to er via ems from home unresponsive. last known well was 0100 today per ems    and admitted for Sepsis ([facility] [address] [A41.9]  ESRD (end stage renal disease) ([facility] [address] [N18.6]     Leukocytosis/fevers ELEVATED PROCAL   eval for sepsis  Still has pd cath eval for peritonitis and .clabsi with hd cath   H/o urine 7/11 Serratia, 6/29 vre  H/o mrsa left le now healed  H/o cdiff 10/2020.,/2020    CHECK BLOOD/URINE CX  FUNGITELL            piperacillin-tazobactam (ZOSYN) 2,250 mg in dextrose 5 % 50 mL IVPB (mini-bag), Q8H ADJUST TO Q12  ADD ANTIFUNGAL   VANCO LEVEL IN AM        Imaging and labs were reviewed per medical records and any ID pertinent labs were addressed with the patient. The patient/FAMILY  was educated about the diagnosis, prognosis, indications, risks and benefits of treatment. An opportunity to ask questions was given to the patient/FAMILY and questions were answered. Thank you for involving me in the care of [address] [place] [address]. Please do not hesitate to call for any questions or concerns.          Electronically signed by Litzy Hyde MD on 9/28/2021 at 2:43 PM

## 2021-09-29 ENCOUNTER — APPOINTMENT (OUTPATIENT)
Dept: INTERVENTIONAL RADIOLOGY/VASCULAR | Age: 31
DRG: 711 | End: 2021-09-29
Payer: COMMERCIAL

## 2021-09-29 ENCOUNTER — APPOINTMENT (OUTPATIENT)
Dept: ULTRASOUND IMAGING | Age: 31
DRG: 711 | End: 2021-09-29
Payer: COMMERCIAL

## 2021-09-29 LAB
ALBUMIN FLUID: 0.9 G/DL
AMYLASE FLUID: 10 U/L
ANION GAP SERPL CALCULATED.3IONS-SCNC: 8 MMOL/L (ref 7–16)
APPEARANCE FLUID: NORMAL
BASOPHILS ABSOLUTE: 0.06 E9/L (ref 0–0.2)
BASOPHILS RELATIVE PERCENT: 0.5 % (ref 0–2)
BOTTLE TYPE: ABNORMAL
BUN BLDV-MCNC: 15 MG/DL (ref 6–20)
CALCIUM SERPL-MCNC: 7.6 MG/DL (ref 8.6–10.2)
CANDIDA ALBICANS BY PCR: NOT DETECTED
CANDIDA GLABRATA BY PCR: NOT DETECTED
CANDIDA KRUSEI BY PCR: NOT DETECTED
CANDIDA PARAPSILOSIS BY PCR: NOT DETECTED
CANDIDA TROPICALIS BY PCR: NOT DETECTED
CELL COUNT FLUID TYPE: NORMAL
CHLORIDE BLD-SCNC: 96 MMOL/L (ref 98–107)
CO2: 30 MMOL/L (ref 22–29)
COLOR FLUID: NORMAL
CREAT SERPL-MCNC: 5.7 MG/DL (ref 0.5–1)
ENTEROBACTER CLOACAE COMPLEX BY PCR: NOT DETECTED
ENTEROBACTERALES BY PCR: NOT DETECTED
ENTEROCOCCUS BY PCR: NOT DETECTED
EOSINOPHILS ABSOLUTE: 0.58 E9/L (ref 0.05–0.5)
EOSINOPHILS RELATIVE PERCENT: 4.7 % (ref 0–6)
ESCHERICHIA COLI BY PCR: NOT DETECTED
FLUID TYPE: NORMAL
GFR AFRICAN AMERICAN: 11
GFR NON-AFRICAN AMERICAN: 11 ML/MIN/1.73
GLUCOSE BLD-MCNC: 77 MG/DL (ref 74–99)
GLUCOSE, FLUID: 102 MG/DL
HAEMOPHILUS INFLUENZAE BY PCR: NOT DETECTED
HCT VFR BLD CALC: 24.1 % (ref 34–48)
HEMOGLOBIN: 7.8 G/DL (ref 11.5–15.5)
IMMATURE GRANULOCYTES #: 0.05 E9/L
IMMATURE GRANULOCYTES %: 0.4 % (ref 0–5)
KLEBSIELLA OXYTOCA BY PCR: NOT DETECTED
KLEBSIELLA PNEUMONIAE GROUP BY PCR: NOT DETECTED
L. PNEUMOPHILA SEROGP 1 UR AG: NORMAL
LD, FLUID: 127 U/L
LISTERIA MONOCYTOGENES BY PCR: NOT DETECTED
LYMPHOCYTES ABSOLUTE: 0.95 E9/L (ref 1.5–4)
LYMPHOCYTES RELATIVE PERCENT: 7.7 % (ref 20–42)
MCH RBC QN AUTO: 30.4 PG (ref 26–35)
MCHC RBC AUTO-ENTMCNC: 32.4 % (ref 32–34.5)
MCV RBC AUTO: 93.8 FL (ref 80–99.9)
METER GLUCOSE: 130 MG/DL (ref 74–99)
METER GLUCOSE: 145 MG/DL (ref 74–99)
METER GLUCOSE: 156 MG/DL (ref 74–99)
METER GLUCOSE: 58 MG/DL (ref 74–99)
METER GLUCOSE: 60 MG/DL (ref 74–99)
METER GLUCOSE: 65 MG/DL (ref 74–99)
METER GLUCOSE: 94 MG/DL (ref 74–99)
METER GLUCOSE: 99 MG/DL (ref 74–99)
METER GLUCOSE: <40 MG/DL (ref 74–99)
METHICILLIN RESISTANCE MECA/C  BY PCR: DETECTED
MONOCYTE, FLUID: 49 %
MONOCYTES ABSOLUTE: 0.3 E9/L (ref 0.1–0.95)
MONOCYTES RELATIVE PERCENT: 2.4 % (ref 2–12)
NEISSERIA MENINGITIDIS BY PCR: NOT DETECTED
NEUTROPHIL, FLUID: 51 %
NEUTROPHILS ABSOLUTE: 10.41 E9/L (ref 1.8–7.3)
NEUTROPHILS RELATIVE PERCENT: 84.3 % (ref 43–80)
NUCLEATED CELLS FLUID: 1519 /UL
ORDER NUMBER: ABNORMAL
PDW BLD-RTO: 14.5 FL (ref 11.5–15)
PLATELET # BLD: 234 E9/L (ref 130–450)
PMV BLD AUTO: 10.6 FL (ref 7–12)
POTASSIUM REFLEX MAGNESIUM: 4.2 MMOL/L (ref 3.5–5)
PROTEIN FLUID: 1.9 G/DL
PROTEUS SPECIES BY PCR: NOT DETECTED
PSEUDOMONAS AERUGINOSA BY PCR: NOT DETECTED
RBC # BLD: 2.57 E12/L (ref 3.5–5.5)
RBC FLUID: <2000 /UL
SERRATIA MARCESCENS BY PCR: NOT DETECTED
SODIUM BLD-SCNC: 134 MMOL/L (ref 132–146)
SOURCE OF BLOOD CULTURE: ABNORMAL
STAPHYLOCOCCUS AUREUS BY PCR: NOT DETECTED
STAPHYLOCOCCUS SPECIES BY PCR: DETECTED
STREP PNEUMONIAE ANTIGEN, URINE: NORMAL
STREPTOCOCCUS AGALACTIAE BY PCR: NOT DETECTED
STREPTOCOCCUS PNEUMONIAE BY PCR: NOT DETECTED
STREPTOCOCCUS PYOGENES  BY PCR: NOT DETECTED
STREPTOCOCCUS SPECIES BY PCR: NOT DETECTED
VANCOMYCIN RANDOM: 18.4 MCG/ML (ref 5–40)
WBC # BLD: 12.4 E9/L (ref 4.5–11.5)

## 2021-09-29 PROCEDURE — 6360000002 HC RX W HCPCS: Performed by: INTERNAL MEDICINE

## 2021-09-29 PROCEDURE — P9047 ALBUMIN (HUMAN), 25%, 50ML: HCPCS | Performed by: INTERNAL MEDICINE

## 2021-09-29 PROCEDURE — 80202 ASSAY OF VANCOMYCIN: CPT

## 2021-09-29 PROCEDURE — 88112 CYTOPATH CELL ENHANCE TECH: CPT

## 2021-09-29 PROCEDURE — 1200000000 HC SEMI PRIVATE

## 2021-09-29 PROCEDURE — 49083 ABD PARACENTESIS W/IMAGING: CPT | Performed by: RADIOLOGY

## 2021-09-29 PROCEDURE — 87040 BLOOD CULTURE FOR BACTERIA: CPT

## 2021-09-29 PROCEDURE — 83615 LACTATE (LD) (LDH) ENZYME: CPT

## 2021-09-29 PROCEDURE — 6370000000 HC RX 637 (ALT 250 FOR IP): Performed by: INTERNAL MEDICINE

## 2021-09-29 PROCEDURE — 2580000003 HC RX 258: Performed by: STUDENT IN AN ORGANIZED HEALTH CARE EDUCATION/TRAINING PROGRAM

## 2021-09-29 PROCEDURE — 76705 ECHO EXAM OF ABDOMEN: CPT

## 2021-09-29 PROCEDURE — 36415 COLL VENOUS BLD VENIPUNCTURE: CPT

## 2021-09-29 PROCEDURE — 85025 COMPLETE CBC W/AUTO DIFF WBC: CPT

## 2021-09-29 PROCEDURE — C1729 CATH, DRAINAGE: HCPCS

## 2021-09-29 PROCEDURE — 6360000002 HC RX W HCPCS: Performed by: SPECIALIST

## 2021-09-29 PROCEDURE — 94640 AIRWAY INHALATION TREATMENT: CPT

## 2021-09-29 PROCEDURE — 99232 SBSQ HOSP IP/OBS MODERATE 35: CPT | Performed by: STUDENT IN AN ORGANIZED HEALTH CARE EDUCATION/TRAINING PROGRAM

## 2021-09-29 PROCEDURE — 82042 OTHER SOURCE ALBUMIN QUAN EA: CPT

## 2021-09-29 PROCEDURE — 88305 TISSUE EXAM BY PATHOLOGIST: CPT

## 2021-09-29 PROCEDURE — 89051 BODY FLUID CELL COUNT: CPT

## 2021-09-29 PROCEDURE — 2580000003 HC RX 258: Performed by: SPECIALIST

## 2021-09-29 PROCEDURE — 0W9G3ZZ DRAINAGE OF PERITONEAL CAVITY, PERCUTANEOUS APPROACH: ICD-10-PCS | Performed by: RADIOLOGY

## 2021-09-29 PROCEDURE — 82150 ASSAY OF AMYLASE: CPT

## 2021-09-29 PROCEDURE — 87205 SMEAR GRAM STAIN: CPT

## 2021-09-29 PROCEDURE — 87070 CULTURE OTHR SPECIMN AEROBIC: CPT

## 2021-09-29 PROCEDURE — 84157 ASSAY OF PROTEIN OTHER: CPT

## 2021-09-29 PROCEDURE — 82962 GLUCOSE BLOOD TEST: CPT

## 2021-09-29 PROCEDURE — 2500000003 HC RX 250 WO HCPCS: Performed by: STUDENT IN AN ORGANIZED HEALTH CARE EDUCATION/TRAINING PROGRAM

## 2021-09-29 PROCEDURE — 0WPG03Z REMOVAL OF INFUSION DEVICE FROM PERITONEAL CAVITY, OPEN APPROACH: ICD-10-PCS | Performed by: SURGERY

## 2021-09-29 PROCEDURE — 87449 NOS EACH ORGANISM AG IA: CPT

## 2021-09-29 PROCEDURE — 82947 ASSAY GLUCOSE BLOOD QUANT: CPT

## 2021-09-29 PROCEDURE — 80048 BASIC METABOLIC PNL TOTAL CA: CPT

## 2021-09-29 PROCEDURE — 2700000000 HC OXYGEN THERAPY PER DAY

## 2021-09-29 PROCEDURE — 6370000000 HC RX 637 (ALT 250 FOR IP): Performed by: STUDENT IN AN ORGANIZED HEALTH CARE EDUCATION/TRAINING PROGRAM

## 2021-09-29 RX ORDER — FENTANYL CITRATE 50 UG/ML
25 INJECTION, SOLUTION INTRAMUSCULAR; INTRAVENOUS
Status: DISCONTINUED | OUTPATIENT
Start: 2021-09-29 | End: 2021-09-29 | Stop reason: SDUPTHER

## 2021-09-29 RX ORDER — FENTANYL CITRATE 50 UG/ML
25 INJECTION, SOLUTION INTRAMUSCULAR; INTRAVENOUS
Status: DISCONTINUED | OUTPATIENT
Start: 2021-09-29 | End: 2021-09-29

## 2021-09-29 RX ORDER — IPRATROPIUM BROMIDE AND ALBUTEROL SULFATE 2.5; .5 MG/3ML; MG/3ML
1 SOLUTION RESPIRATORY (INHALATION) EVERY 4 HOURS PRN
Status: DISCONTINUED | OUTPATIENT
Start: 2021-09-29 | End: 2021-10-04 | Stop reason: HOSPADM

## 2021-09-29 RX ORDER — LOPERAMIDE HYDROCHLORIDE 2 MG/1
4 CAPSULE ORAL 3 TIMES DAILY PRN
Status: DISCONTINUED | OUTPATIENT
Start: 2021-09-29 | End: 2021-10-04 | Stop reason: HOSPADM

## 2021-09-29 RX ORDER — FENTANYL CITRATE 50 UG/ML
25 INJECTION, SOLUTION INTRAMUSCULAR; INTRAVENOUS
Status: DISCONTINUED | OUTPATIENT
Start: 2021-09-29 | End: 2021-09-30

## 2021-09-29 RX ADMIN — SEVELAMER CARBONATE 800 MG: 800 TABLET, FILM COATED ORAL at 14:50

## 2021-09-29 RX ADMIN — Medication 10 ML: at 21:25

## 2021-09-29 RX ADMIN — ALBUMIN (HUMAN) 25 G: 0.25 INJECTION, SOLUTION INTRAVENOUS at 17:56

## 2021-09-29 RX ADMIN — ACETAMINOPHEN 650 MG: 325 TABLET ORAL at 18:02

## 2021-09-29 RX ADMIN — FLUCONAZOLE 400 MG: 2 INJECTION, SOLUTION INTRAVENOUS at 21:15

## 2021-09-29 RX ADMIN — METOCLOPRAMIDE 5 MG: 10 TABLET ORAL at 21:14

## 2021-09-29 RX ADMIN — EPOETIN ALFA-EPBX 2000 UNITS: 2000 INJECTION, SOLUTION INTRAVENOUS; SUBCUTANEOUS at 08:58

## 2021-09-29 RX ADMIN — PIPERACILLIN AND TAZOBACTAM 3375 MG: 3; .375 INJECTION, POWDER, FOR SOLUTION INTRAVENOUS at 14:43

## 2021-09-29 RX ADMIN — SEVELAMER CARBONATE 800 MG: 800 TABLET, FILM COATED ORAL at 08:57

## 2021-09-29 RX ADMIN — INSULIN LISPRO 3 UNITS: 100 INJECTION, SOLUTION INTRAVENOUS; SUBCUTANEOUS at 13:32

## 2021-09-29 RX ADMIN — DEXTROSE MONOHYDRATE 12.5 G: 25 INJECTION, SOLUTION INTRAVENOUS at 18:05

## 2021-09-29 RX ADMIN — HYDROXYZINE HYDROCHLORIDE 25 MG: 25 TABLET ORAL at 08:57

## 2021-09-29 RX ADMIN — METOPROLOL TARTRATE 25 MG: 25 TABLET, FILM COATED ORAL at 21:14

## 2021-09-29 RX ADMIN — ALBUMIN (HUMAN) 25 G: 0.25 INJECTION, SOLUTION INTRAVENOUS at 08:59

## 2021-09-29 RX ADMIN — INSULIN LISPRO 1 UNITS: 100 INJECTION, SOLUTION INTRAVENOUS; SUBCUTANEOUS at 13:27

## 2021-09-29 RX ADMIN — FOLIC ACID 1 MG: 1 TABLET ORAL at 08:57

## 2021-09-29 RX ADMIN — EPOETIN ALFA-EPBX 3000 UNITS: 3000 INJECTION, SOLUTION INTRAVENOUS; SUBCUTANEOUS at 08:58

## 2021-09-29 RX ADMIN — LEVOTHYROXINE SODIUM 75 MCG: 0.07 TABLET ORAL at 06:33

## 2021-09-29 RX ADMIN — DEXTROSE MONOHYDRATE 12.5 G: 25 INJECTION, SOLUTION INTRAVENOUS at 14:38

## 2021-09-29 RX ADMIN — INSULIN LISPRO 3 UNITS: 100 INJECTION, SOLUTION INTRAVENOUS; SUBCUTANEOUS at 09:04

## 2021-09-29 RX ADMIN — PANTOPRAZOLE SODIUM 40 MG: 40 TABLET, DELAYED RELEASE ORAL at 06:34

## 2021-09-29 RX ADMIN — ARIPIPRAZOLE 5 MG: 5 TABLET ORAL at 21:15

## 2021-09-29 RX ADMIN — METOPROLOL TARTRATE 25 MG: 25 TABLET, FILM COATED ORAL at 08:57

## 2021-09-29 RX ADMIN — METOCLOPRAMIDE 5 MG: 10 TABLET ORAL at 08:57

## 2021-09-29 RX ADMIN — ACETAMINOPHEN 650 MG: 325 TABLET ORAL at 06:34

## 2021-09-29 RX ADMIN — Medication 5 ML: at 08:59

## 2021-09-29 RX ADMIN — INSULIN GLARGINE 10 UNITS: 100 INJECTION, SOLUTION SUBCUTANEOUS at 09:04

## 2021-09-29 RX ADMIN — IPRATROPIUM BROMIDE AND ALBUTEROL SULFATE 1 AMPULE: .5; 3 SOLUTION RESPIRATORY (INHALATION) at 23:48

## 2021-09-29 RX ADMIN — MIRTAZAPINE 15 MG: 15 TABLET, FILM COATED ORAL at 21:15

## 2021-09-29 RX ADMIN — PIPERACILLIN AND TAZOBACTAM 3375 MG: 3; .375 INJECTION, POWDER, FOR SOLUTION INTRAVENOUS at 01:22

## 2021-09-29 RX ADMIN — HEPARIN SODIUM 5000 UNITS: 5000 INJECTION INTRAVENOUS; SUBCUTANEOUS at 21:14

## 2021-09-29 RX ADMIN — ROPINIROLE HYDROCHLORIDE 0.25 MG: 0.25 TABLET, FILM COATED ORAL at 21:14

## 2021-09-29 ASSESSMENT — PAIN DESCRIPTION - DESCRIPTORS
DESCRIPTORS: ACHING;DULL
DESCRIPTORS: ACHING;DISCOMFORT

## 2021-09-29 ASSESSMENT — PAIN DESCRIPTION - LOCATION
LOCATION: GENERALIZED
LOCATION: ABDOMEN

## 2021-09-29 ASSESSMENT — PAIN SCALES - GENERAL
PAINLEVEL_OUTOF10: 9
PAINLEVEL_OUTOF10: 4
PAINLEVEL_OUTOF10: 3
PAINLEVEL_OUTOF10: 10

## 2021-09-29 ASSESSMENT — PAIN DESCRIPTION - PROGRESSION: CLINICAL_PROGRESSION: NOT CHANGED

## 2021-09-29 ASSESSMENT — PAIN DESCRIPTION - PAIN TYPE
TYPE: ACUTE PAIN
TYPE: CHRONIC PAIN

## 2021-09-29 ASSESSMENT — PAIN DESCRIPTION - ONSET
ONSET: ON-GOING
ONSET: GRADUAL

## 2021-09-29 ASSESSMENT — PAIN DESCRIPTION - ORIENTATION: ORIENTATION: RIGHT;LEFT

## 2021-09-29 ASSESSMENT — PAIN DESCRIPTION - FREQUENCY
FREQUENCY: INTERMITTENT
FREQUENCY: CONTINUOUS

## 2021-09-29 ASSESSMENT — PAIN - FUNCTIONAL ASSESSMENT: PAIN_FUNCTIONAL_ASSESSMENT: ACTIVITIES ARE NOT PREVENTED

## 2021-09-29 NOTE — PROGRESS NOTES
[address] [place], [place] [address]ist   Progress Note    Admitting Date and Time: 9/28/2021  9:36 AM  Admit Dx: ESRD (end stage renal disease) ([facility] [N18.6]  Sepsis ([facility] [A41.9]  Chronic renal failure syndrome, stage 4 (severe) (HCC) [N18.4]  Sepsis with acute organ dysfunction, due to unspecified organism, unspecified type, unspecified whether septic shock present ([facility] [A41.9, R65.20]    Subjective:    Pt feels the same as yesterday, very achy, tired, nauseous and has some abdominal pain. She also states she is coughing a little bit. Denies any fevers/chills or vomiting. States she did have loose stools but not diarrhea. Per RN: Patient to go for HD today    ROS: denies fever, chills, cp, sob, n/v, HA unless stated above.      ARIPiprazole  5 mg Oral Nightly    [Held by provider] bumetanide  2 mg Oral BID    folic acid  1 mg Oral Daily    hydrOXYzine  25 mg Oral Daily    insulin glargine  10 Units SubCUTAneous BID    levothyroxine  75 mcg Oral Daily    metoclopramide  5 mg Oral TID    metoprolol tartrate  25 mg Oral BID    mirtazapine  15 mg Oral Nightly    pantoprazole  40 mg Oral QAM AC    rOPINIRole  0.25 mg Oral Nightly    sevelamer  800 mg Oral TID WC    sodium chloride flush  5-40 mL IntraVENous 2 times per day    insulin lispro  3 Units SubCUTAneous TID WC    insulin lispro  0-6 Units SubCUTAneous TID WC    insulin lispro  0-3 Units SubCUTAneous Nightly    heparin (porcine)  5,000 Units SubCUTAneous BID    piperacillin-tazobactam  3,375 mg IntraVENous Q12H    fluconazole  400 mg IntraVENous Q24H    epoetin nena-epbx  3,000 Units SubCUTAneous Once per day on Mon Wed Fri    And    epoetin nena-epbx  2,000 Units SubCUTAneous Once per day on Mon Wed Fri    vancomycin (VANCOCIN) intermittent dosing (placeholder)   Other RX Placeholder    albumin human  25 g IntraVENous Q8H     sodium chloride flush, 5-40 mL, PRN  sodium chloride, 25 mL, PRN  acetaminophen, 650 mg, Q6H PRN Or  acetaminophen, 650 mg, Q6H PRN  glucose, 15 g, PRN  dextrose, 12.5 g, PRN  glucagon (rDNA), 1 mg, PRN  dextrose, 100 mL/hr, PRN  ondansetron, 4 mg, Q8H PRN   Or  ondansetron, 4 mg, Q6H PRN  polyethylene glycol, 17 g, Daily PRN         Objective:    BP (!) 173/88   Pulse 110   Temp 98.8 °F (37.1 °C) (Oral)   Resp 18   Ht 5' 4\" (1.626 m)   Wt 152 lb 7 oz (69.1 kg)   LMP  (LMP Unknown)   SpO2 98%   BMI 26.17 kg/m²      General Appearance: alert and oriented to person, place and time and in no acute distress, appears edematous in face and extremities  Skin: warm and dry, RIJ TDC in place, is slightly tender to palpation and dressing does not appear clean.  PD catheter does not appear infected  Head: normocephalic and atraumatic  Eyes: pupils equal, round, and reactive to light, extraocular eye movements intact, conjunctivae normal  Neck: neck supple and non tender without mass   Pulmonary/Chest: clear to auscultation bilaterally- no wheezes, rales or rhonchi, poor inspiratory effort, no respiratory distress  Cardiovascular: normal rate, normal S1 and S2 and no carotid bruits  Abdomen: soft, very mildly tender, non-distended, normal bowel sounds, no masses or organomegaly  Extremities: no cyanosis, no clubbing and edema present in b/l LE  Neurologic: no cranial nerve deficit and speech normal however mumbled       Recent Labs     09/28/21  1000 09/29/21  0549    134   K 4.1 4.2   CL 93* 96*   CO2 34* 30*   BUN 14 15   CREATININE 5.1* 5.7*   GLUCOSE 228* 77   CALCIUM 7.7* 7.6*       Recent Labs     09/28/21  1000   ALKPHOS 213*   PROT 4.9*   LABALBU 2.0*   BILITOT <0.2   AST 13   ALT 11       Recent Labs     09/28/21  1000 09/29/21  0549   WBC 15.3* 12.4*   RBC 2.73* 2.57*   HGB 8.3* 7.8*   HCT 25.6* 24.1*   MCV 93.8 93.8   MCH 30.4 30.4   MCHC 32.4 32.4   RDW 14.9 14.5    234   MPV 10.7 10.6       Respiratory Panel, Molecular, with COVID-19 (Restricted: peds pts or suitable admitted adults) [[id number]] (Abnormal)    Collected: 09/28/21 1415    Updated: 09/28/21 1652    Specimen Source: Nasopharyngeal     Adenovirus by PCR Not Detected    Bordetella parapertussis by PCR Not Detected    Bordetella pertussis by PCR Not Detected    Chlamydophilia pneumoniae by PCR Not Detected    Coronavirus 229E by PCR Not Detected    Coronavirus HKU1 by PCR Not Detected    Coronavirus NL63 by PCR Not Detected    Coronavirus OC43 by PCR Not Detected    SARS-CoV-2, PCR Not Detected    Human Metapneumovirus by PCR Not Detected    Human Rhinovirus/Enterovirus by PCR DETECTEDAbnormal     Influenza A by PCR Not Detected    Influenza B by PCR Not Detected    Mycoplasma pneumoniae by PCR Not Detected    Parainfluenza Virus 1 by PCR Not Detected    Parainfluenza Virus 2 by PCR Not Detected    Parainfluenza Virus 3 by PCR Not Detected    Parainfluenza Virus 4 by PCR Not Detected    Respiratory Syncytial Virus by PCR Not Detected         Radiology:   US ABDOMEN LIMITED   Final Result   Mild-to-moderate amount of ascites. CT HEAD WO CONTRAST   Final Result   No acute intracranial abnormality. XR CHEST PORTABLE   Final Result   Basilar predominant linear/hazy opacities likely represent a component of   atelectasis. Pulmonary edema or atypical inflammation would be additional   considerations. Assessment:  Active Problems:    Sepsis ([address]    ESRD (end stage renal disease) ([facility] [address]  Resolved Problems:    * No resolved hospital problems. *      Plan:  1. Sepsis of unknown origin  -Patient was altered on presentation to ER, tachycardic, febrile, WBC 15  -BCx drawn in ER, CXR revealed bibasilar linear opacities, atelectasis vs pleural effusion vs inflammation.  -Procal elevated at 1.3, strep/legionella negative but patient tested positive for rhinovirus on RFA. WBC today 12.4 from 15 yesterday.   -ID on board, currently have patient on vanc, zosyn and diflucan  -Patient had mild abdominal pain, and history of PD catheter. Checked abdominal US for ascites and revealed mild-moderate ascites. Will consult IR to see if can do paracentesis with diagnostic tests to check for SBP (unlikely but should be ruled out)     2. AMS (resolved)  -Was altered at home, is now alert  -Likely 2/2 #1  -Will monitor mental status     3. Acute hypoxic respiratory failure  -Patient was reported to be in 80s on initial EMS eval  -Was 90% on RA in ER, placed on 2L O2 via NC and now 100%  -ABG revealed 7.476/46.6/40.3/33.6  -CXR with bibasilar linear opacities, will workup for possible PNA  -Still on 2L NC but saturating 98%, wean O2 as tolerated     4. DM  -History established, home meds lantus 10 units BID, lispro 3 units TIDAC and ISS  -Glucose 228 on admission, AG 6,  BHB 0.6, pH 7.476 - not in DKA or HHS  -Glucose this AM 77  -Will continue home meds and monitor FSBG     5. ESRD on HD  -Switched from PD to HD after last admission  -Still has PD catheter in   -Consulted nephro for HD, stated she will get HD today and then MWF  -Continue home sevelamer and retacrit     6. Hypothyroidism  -Continue home synthroid      7. MDD  -Continue home abilify and remeron     8. Gastroparesis  -Continue home reglan     9. HFpEF  -Continue home bumex and metoprolol tartrate    NOTE: This report was transcribed using voice recognition software. Every effort was made to ensure accuracy; however, inadvertent computerized transcription errors may be present.      Electronically signed by Dorothy Diaz MD on 9/29/2021 at 12:31 PM

## 2021-09-29 NOTE — PROGRESS NOTES
Pharmacy Consultation Note  (Antibiotic Dosing and Monitoring)    Initial consult date: 21  Consulting physician: Dr Abby Paul  Drug(s): Vancomycin IV  Indication: Pneumonia    Ht Readings from Last 1 Encounters:   21 5' 4\" (1.626 m)     Wt Readings from Last 1 Encounters:   21 152 lb 7 oz (69.1 kg)     Age/  Gender Act BW IBW DW  Allergy Information   36 y.o.   female 69.1 kg 54.7 kg 60.5 kg  Cefepime and Toradol [ketorolac tromethamine]          Date  WBC HD Drug/Dose Time   Given Level(s)   (Time) Comments     (#1) 15.3 -- Vancomycin 1000 mg IV x 1 1216       (#2) 12.4  No dose -- Random @ 0549 = 18.4 mcg/mL      (#3)           (#4)           (#5)           (#6)           (#7)           Estimated Creatinine Clearance: 14 mL/min (A) (based on SCr of 5.7 mg/dL (H)). UOP over the past 24 hours:       Intake/Output Summary (Last 24 hours) at 2021 1029  Last data filed at 2021 0723  Gross per 24 hour   Intake    Output 200 ml   Net -200 ml       Temp max: Temp (24hrs), Av.9 °F (37.2 °C), Min:98 °F (36.7 °C), Max:99.9 °F (37.7 °C)      Antibiotic Regimen:  Antibiotic Dose Date Initiated   Zosyn 3.375 g IV Q12H      Cultures:  available culture and sensitivity results were reviewed in EPIC  Cultures sent and are pending. Culture Date Result    Blood cx #1     Covid-19  Not detected   Respiratory Panel  Rhinovirus   Urine cx       Assessment:  · Consulted by Dr. Abby Paul to dose/monitor vancomycin  · Goal trough level:  15-20 mcg/mL  · Pt is a 35 y/o F with a Hx of noncompliance to home medications and frequent resultant hospital admissions. Admitted for decreased responsiveness and hypoxia  · Peritoneal dialysis outpatient.  Has Tessio to use for HD inpatient  · Random @ 0549 = 18.4 mcg/mL    Plan:  · No HD ordered today, no dose  · Dosing and levels according to HD schedule  · Random level tomorrow morning  · Pharmacist will follow and monitor/adjust dosing as necessary      Thank you for the consult,    Kendy Villanueva, PharmD, BCPS 9/29/2021 3:30 PM   Ext: [phone]

## 2021-09-29 NOTE — FLOWSHEET NOTE
09/29/21 1655   Vital Signs   BP (!) 157/86   Temp 98.7 °F (37.1 °C)   Pulse 96   Resp 18   Weight 151 lb 10.8 oz (68.8 kg)   Weight Method Bed scale   Percent Weight Change 0   Post-Hemodialysis Assessment   Post-Treatment Procedures Blood returned;Catheter capped, clamped and heparinized x 2 ports   Machine Disinfection Process Acid/Vinegar Clean;Heat Disinfect; Exterior Machine Disinfection   Rinseback Volume (ml) 300 ml   Total Liters Processed (l/min) 11.2 l/min   Dialyzer Clearance Clear   Duration of Treatment (minutes) 50 minutes   NET Removed (ml) 0 ml   Tolerated Treatment Poor   Patient Response to Treatment pt cath not functioning.  txd ended per Dr Leroy Mckinney. pt in alot of pain from her paracentesis

## 2021-09-29 NOTE — PROGRESS NOTES
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 She was minimally responsive and hypoxemic. She has been admitted with a diagnosis of possible sepsis. She was given vancomycin and piperacillin-tazobactam in the ER. Patient reports having intermittent nausea and vomiting but denies any diarrhea, fever or chills.     9/29/21;  PATIENT FEELS BETTER TODAY , NO EMESIS    Current Medications:    Current Facility-Administered Medications: ARIPiprazole (ABILIFY) tablet 5 mg, 5 mg, Oral, Nightly  [Held by provider] bumetanide (BUMEX) tablet 2 mg, 2 mg, Oral, BID  folic acid (FOLVITE) tablet 1 mg, 1 mg, Oral, Daily  hydrOXYzine (ATARAX) tablet 25 mg, 25 mg, Oral, Daily  insulin glargine (LANTUS) injection vial 10 Units, 10 Units, SubCUTAneous, BID  levothyroxine (SYNTHROID) tablet 75 mcg, 75 mcg, Oral, Daily  metoclopramide (REGLAN) tablet 5 mg, 5 mg, Oral, TID  metoprolol tartrate (LOPRESSOR) tablet 25 mg, 25 mg, Oral, BID  mirtazapine (REMERON) tablet 15 mg, 15 mg, Oral, Nightly  pantoprazole (PROTONIX) tablet 40 mg, 40 mg, Oral, QAM AC  rOPINIRole (REQUIP) tablet 0.25 mg, 0.25 mg, Oral, Nightly  sevelamer (RENVELA) tablet 800 mg, 800 mg, Oral, TID WC  sodium chloride flush 0.9 % injection 5-40 mL, 5-40 mL, IntraVENous, 2 times per day  sodium chloride flush 0.9 % injection 5-40 mL, 5-40 mL, IntraVENous, PRN  0.9 % sodium chloride infusion, 25 mL, IntraVENous, PRN  acetaminophen (TYLENOL) tablet 650 mg, 650 mg, Oral, Q6H PRN **OR** acetaminophen (TYLENOL) suppository 650 mg, 650 mg, Rectal, Q6H PRN  glucose (GLUTOSE) 40 % oral gel 15 g, 15 g, Oral, PRN  dextrose 50 % IV solution, 12.5 g, IntraVENous, PRN  glucagon (rDNA) injection 1 mg, 1 mg, IntraMUSCular, PRN  dextrose 5 % solution, 100 mL/hr, IntraVENous, PRN  insulin lispro (HUMALOG) injection vial 3 Units, 3 Units, SubCUTAneous, TID WC  insulin lispro (HUMALOG) injection vial 0-6 Units, 0-6 Units, SubCUTAneous, TID WC  insulin lispro (HUMALOG) injection vial 0-3 Units, 0-3 Units, SubCUTAneous, Nightly  ondansetron (ZOFRAN-ODT) disintegrating tablet 4 mg, 4 mg, Oral, Q8H PRN **OR** ondansetron (ZOFRAN) injection 4 mg, 4 mg, IntraVENous, Q6H PRN  polyethylene glycol (GLYCOLAX) packet 17 g, 17 g, Oral, Daily PRN  heparin (porcine) injection 5,000 Units, 5,000 Units, SubCUTAneous, BID  piperacillin-tazobactam (ZOSYN) 3,375 mg in dextrose 5 % 50 mL IVPB extended infusion (mini-bag), 3,375 mg, IntraVENous, Q12H  fluconazole (DIFLUCAN) in 0.9 % sodium chloride IVPB 400 mg, 400 mg, IntraVENous, Q24H  0.9 % sodium chloride infusion, , IntraVENous, Q12H  epoetin nena-epbx (RETACRIT) injection 3,000 Units, 3,000 Units, SubCUTAneous, Once per day on Mon Wed Fri **AND** epoetin nena-epbx (RETACRIT) injection 2,000 Units, 2,000 Units, SubCUTAneous, Once per day on Mon Wed Fri  vancomycin (VANCOCIN) intermittent dosing (placeholder), , Other, RX Placeholder  albumin human 25 % IV solution 25 g, 25 g, IntraVENous, Q8H  Allergies:  Cefepime and Toradol [ketorolac tromethamine]    Social History:    TOBACCO:   reports that she quit smoking about 8 months ago. Her smoking use included cigarettes.  She has a 3.00 pack-year smoking history. She has never used smokeless tobacco.  ETOH:   reports no history of alcohol use.     Family History:       Problem Relation Age of Onset   [place] Asthma Mother     Hypertension Mother     High Blood Pressure Mother     Diabetes Mother     Asthma Brother     High Blood Pressure Father      REVIEW OF SYSTEMS:    CONSTITUTIONAL:  positive for  fatigue and malaise  EYES:  negative  HEENT:  negative for  hearing loss and epistaxis  RESPIRATORY:  positive for dyspnea  CARDIOVASCULAR:  negative for  chest pain, dyspnea  GASTROINTESTINAL:  positive for nausea  GENITOURINARY:  negative  INTEGUMENT/BREAST:  negative  HEMATOLOGIC/LYMPHATIC:  negative  ALLERGIC/IMMUNOLOGIC:  positive for drug reactions  ENDOCRINE:  positive for weight changes    MUSCULOSKELETAL:  negative  NEUROLOGICAL:  negative        PHYSICAL EXAM:      Vitals:    VITALS:  BP (!) 173/88   Pulse 110   Temp 98.8 °F (37.1 °C) (Oral)   Resp 18   Ht 5' 4\" (1.626 m)   Wt 152 lb 7 oz (69.1 kg)   LMP  (LMP Unknown)   SpO2 98%   BMI 26.17 kg/m²   24HR INTAKE/OUTPUT:      Intake/Output Summary (Last 24 hours) at 9/29/2021 1009  Last data filed at 9/29/2021 0723  Gross per 24 hour   Intake    Output 200 ml   Net -200 ml       PD Catheter Exam:  PD catheter exit site clean    Constitutional: Awake in no acute distress  HEENT:  Normocephalic, PERRL  Respiratory: Decreased breath sounds on the basis  Cardiovascular/Edema:  RRR, S1/S2  Gastrointestinal:  Soft, PD catheter exit site clean   Neurologic:  Nonfocal, AVELAR  Skin:  Warm, dry, no lesions  Other:  no edema     DATA:    CBC:   Lab Results   Component Value Date    WBC 12.4 09/29/2021    RBC 2.57 09/29/2021    HGB 7.8 09/29/2021    HCT 24.1 09/29/2021    MCV 93.8 09/29/2021    MCH 30.4 09/29/2021    MCHC 32.4 09/29/2021    RDW 14.5 09/29/2021     09/29/2021    MPV 10.6 09/29/2021     CMP:    Lab Results   Component Value Date     09/29/2021    K 4.2 09/29/2021    CL 96 09/29/2021    CO2 30 09/29/2021    BUN 15 09/29/2021    CREATININE 5.7 09/29/2021    GFRAA 11 09/29/2021    LABGLOM 11 09/29/2021    GLUCOSE 77 09/29/2021    GLUCOSE 130 05/18/2012    PROT 4.9 09/28/2021    LABALBU 2.0 09/28/2021    LABALBU 4.1 05/18/2012    CALCIUM 7.6 09/29/2021    BILITOT <0.2 09/28/2021    ALKPHOS 213 09/28/2021    AST 13 09/28/2021    ALT 11 09/28/2021     Magnesium:    Lab Results   Component Value Date    MG 1.7 09/28/2021     Phosphorus:    Lab Results   Component Value Date    PHOS 5.9 09/14/2021     Radiology Review:         CXR 9/08/2021   No acute cardiopulmonary process.               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 She was minimally responsive and hypoxemic. She has been admitted with a diagnosis of possible sepsis. She was given vancomycin and piperacillin-tazobactam in the ER. Patient reports having intermittent nausea and vomiting but denies any diarrhea, fever or chills. 1. ESRD on home HD dialysis training, to continue HD 3 times a week while in the hospital.  2. Metabolic alkalosis (pH 4.259, bicarb level 34) due to combination of vomiting and dialysis, with respiratory compensation  3. HTN, on metoprolol  4. MBD of CKD, on sevelamer   5. Anemia of CKD, on epoetin alpha    6. Restless leg syndrome, on ropinirole  -------------------------------------------------------------------  5.  Altered mental status, resolved, hypoglycemia? 6. Possible sepsis, procalcitonin level 1.33, has received piperacillin-tazobactam and vancomycin  7. History of gastroparesis, on metoclopramide    Plan:    · HD today and 3 times a week MWF  · Monitor daily labs  · Albumin 25 g IV every 8 hours x2 days  · Epoetin alpha 5000 units 3 times a week    Thank you Dr. Veronique Melissa for allowing us to participate in the care of Ms.  [address]    Electronically signed by Pedrito Huerta MD on 9/29/2021 at 10:09 AM

## 2021-09-29 NOTE — PROGRESS NOTES
[address] [place] [address] [facility]  THUANIDA  Progress Note  CC: bacteremia  Face to face encounter   SUBJECTIVE:  Patient is tolerating medications. No reported adverse drug reactions. ROS: No nausea, vomiting, loose stools. Up on bedside commode- has arceo cath in  Feeling slightly better than when she was admitted  No fevers today .      Medications:  Scheduled Meds:   ARIPiprazole  5 mg Oral Nightly    [Held by provider] bumetanide  2 mg Oral BID    folic acid  1 mg Oral Daily    hydrOXYzine  25 mg Oral Daily    insulin glargine  10 Units SubCUTAneous BID    levothyroxine  75 mcg Oral Daily    metoclopramide  5 mg Oral TID    metoprolol tartrate  25 mg Oral BID    mirtazapine  15 mg Oral Nightly    pantoprazole  40 mg Oral QAM AC    rOPINIRole  0.25 mg Oral Nightly    sevelamer  800 mg Oral TID WC    sodium chloride flush  5-40 mL IntraVENous 2 times per day    insulin lispro  3 Units SubCUTAneous TID WC    insulin lispro  0-6 Units SubCUTAneous TID WC    insulin lispro  0-3 Units SubCUTAneous Nightly    heparin (porcine)  5,000 Units SubCUTAneous BID    piperacillin-tazobactam  3,375 mg IntraVENous Q12H    fluconazole  400 mg IntraVENous Q24H    epoetin nena-epbx  3,000 Units SubCUTAneous Once per day on Mon Wed Fri    And    epoetin nena-epbx  2,000 Units SubCUTAneous Once per day on Mon Wed Fri    vancomycin (VANCOCIN) intermittent dosing (placeholder)   Other RX Placeholder    albumin human  25 g IntraVENous Q8H     Continuous Infusions:   sodium chloride      dextrose      sodium chloride       PRN Meds:sodium chloride flush, sodium chloride, acetaminophen **OR** acetaminophen, glucose, dextrose, glucagon (rDNA), dextrose, ondansetron **OR** ondansetron, polyethylene glycol  OBJECTIVE:  Patient Vitals for the past 24 hrs:   BP Temp Temp src Pulse Resp SpO2   09/29/21 0600 (!) 173/88 98.8 °F (37.1 °C) Oral 110 18 98 %   09/28/21 1900 (!) 180/88 98 °F (36.7 °C) Oral 78 20 98

## 2021-09-30 LAB
ANION GAP SERPL CALCULATED.3IONS-SCNC: 10 MMOL/L (ref 7–16)
BASOPHILS ABSOLUTE: 0.04 E9/L (ref 0–0.2)
BASOPHILS RELATIVE PERCENT: 0.4 % (ref 0–2)
BLOOD CULTURE, ROUTINE: ABNORMAL
BUN BLDV-MCNC: 17 MG/DL (ref 6–20)
CALCIUM SERPL-MCNC: 7.8 MG/DL (ref 8.6–10.2)
CHLORIDE BLD-SCNC: 101 MMOL/L (ref 98–107)
CO2: 25 MMOL/L (ref 22–29)
CREAT SERPL-MCNC: 5.8 MG/DL (ref 0.5–1)
EOSINOPHILS ABSOLUTE: 0.59 E9/L (ref 0.05–0.5)
EOSINOPHILS RELATIVE PERCENT: 6.1 % (ref 0–6)
GFR AFRICAN AMERICAN: 10
GFR NON-AFRICAN AMERICAN: 10 ML/MIN/1.73
GLUCOSE BLD-MCNC: 162 MG/DL (ref 74–99)
HCT VFR BLD CALC: 22.9 % (ref 34–48)
HEMOGLOBIN: 7.3 G/DL (ref 11.5–15.5)
IMMATURE GRANULOCYTES #: 0.03 E9/L
IMMATURE GRANULOCYTES %: 0.3 % (ref 0–5)
LYMPHOCYTES ABSOLUTE: 1.07 E9/L (ref 1.5–4)
LYMPHOCYTES RELATIVE PERCENT: 11.1 % (ref 20–42)
MCH RBC QN AUTO: 29.8 PG (ref 26–35)
MCHC RBC AUTO-ENTMCNC: 31.9 % (ref 32–34.5)
MCV RBC AUTO: 93.5 FL (ref 80–99.9)
METER GLUCOSE: 104 MG/DL (ref 74–99)
METER GLUCOSE: 113 MG/DL (ref 74–99)
METER GLUCOSE: 151 MG/DL (ref 74–99)
METER GLUCOSE: 152 MG/DL (ref 74–99)
METER GLUCOSE: 80 MG/DL (ref 74–99)
MONOCYTES ABSOLUTE: 0.45 E9/L (ref 0.1–0.95)
MONOCYTES RELATIVE PERCENT: 4.6 % (ref 2–12)
NEUTROPHILS ABSOLUTE: 7.5 E9/L (ref 1.8–7.3)
NEUTROPHILS RELATIVE PERCENT: 77.5 % (ref 43–80)
ORGANISM: ABNORMAL
PDW BLD-RTO: 14.6 FL (ref 11.5–15)
PLATELET # BLD: 254 E9/L (ref 130–450)
PMV BLD AUTO: 10.5 FL (ref 7–12)
POTASSIUM REFLEX MAGNESIUM: 4.7 MMOL/L (ref 3.5–5)
PROCALCITONIN: 10.38 NG/ML (ref 0–0.08)
RBC # BLD: 2.45 E12/L (ref 3.5–5.5)
SODIUM BLD-SCNC: 136 MMOL/L (ref 132–146)
URINE CULTURE, ROUTINE: NORMAL
WBC # BLD: 9.7 E9/L (ref 4.5–11.5)

## 2021-09-30 PROCEDURE — 90935 HEMODIALYSIS ONE EVALUATION: CPT | Performed by: INTERNAL MEDICINE

## 2021-09-30 PROCEDURE — 6360000002 HC RX W HCPCS: Performed by: SPECIALIST

## 2021-09-30 PROCEDURE — 80048 BASIC METABOLIC PNL TOTAL CA: CPT

## 2021-09-30 PROCEDURE — 82962 GLUCOSE BLOOD TEST: CPT

## 2021-09-30 PROCEDURE — 36415 COLL VENOUS BLD VENIPUNCTURE: CPT

## 2021-09-30 PROCEDURE — 6360000002 HC RX W HCPCS: Performed by: STUDENT IN AN ORGANIZED HEALTH CARE EDUCATION/TRAINING PROGRAM

## 2021-09-30 PROCEDURE — 84145 PROCALCITONIN (PCT): CPT

## 2021-09-30 PROCEDURE — 2580000003 HC RX 258: Performed by: STUDENT IN AN ORGANIZED HEALTH CARE EDUCATION/TRAINING PROGRAM

## 2021-09-30 PROCEDURE — 2700000000 HC OXYGEN THERAPY PER DAY

## 2021-09-30 PROCEDURE — 6370000000 HC RX 637 (ALT 250 FOR IP): Performed by: STUDENT IN AN ORGANIZED HEALTH CARE EDUCATION/TRAINING PROGRAM

## 2021-09-30 PROCEDURE — 1200000000 HC SEMI PRIVATE

## 2021-09-30 PROCEDURE — 6360000002 HC RX W HCPCS: Performed by: INTERNAL MEDICINE

## 2021-09-30 PROCEDURE — 2580000003 HC RX 258: Performed by: SPECIALIST

## 2021-09-30 PROCEDURE — 99232 SBSQ HOSP IP/OBS MODERATE 35: CPT | Performed by: STUDENT IN AN ORGANIZED HEALTH CARE EDUCATION/TRAINING PROGRAM

## 2021-09-30 PROCEDURE — P9047 ALBUMIN (HUMAN), 25%, 50ML: HCPCS | Performed by: INTERNAL MEDICINE

## 2021-09-30 PROCEDURE — 85025 COMPLETE CBC W/AUTO DIFF WBC: CPT

## 2021-09-30 PROCEDURE — 6370000000 HC RX 637 (ALT 250 FOR IP): Performed by: INTERNAL MEDICINE

## 2021-09-30 RX ORDER — SODIUM CHLORIDE 0.9 % (FLUSH) 0.9 %
5-40 SYRINGE (ML) INJECTION EVERY 12 HOURS SCHEDULED
Status: DISCONTINUED | OUTPATIENT
Start: 2021-09-30 | End: 2021-09-30 | Stop reason: SDUPTHER

## 2021-09-30 RX ORDER — SODIUM CHLORIDE 9 MG/ML
25 INJECTION, SOLUTION INTRAVENOUS PRN
Status: DISCONTINUED | OUTPATIENT
Start: 2021-09-30 | End: 2021-10-04 | Stop reason: HOSPADM

## 2021-09-30 RX ORDER — DEXTROSE AND SODIUM CHLORIDE 5; .9 G/100ML; G/100ML
INJECTION, SOLUTION INTRAVENOUS CONTINUOUS
Status: DISCONTINUED | OUTPATIENT
Start: 2021-09-30 | End: 2021-10-02

## 2021-09-30 RX ORDER — HEPARIN SODIUM (PORCINE) LOCK FLUSH IV SOLN 100 UNIT/ML 100 UNIT/ML
1 SOLUTION INTRAVENOUS PRN
Status: DISCONTINUED | OUTPATIENT
Start: 2021-09-30 | End: 2021-10-04 | Stop reason: HOSPADM

## 2021-09-30 RX ORDER — SODIUM CHLORIDE 0.9 % (FLUSH) 0.9 %
5-40 SYRINGE (ML) INJECTION PRN
Status: DISCONTINUED | OUTPATIENT
Start: 2021-09-30 | End: 2021-09-30 | Stop reason: SDUPTHER

## 2021-09-30 RX ORDER — SODIUM CHLORIDE 9 MG/ML
25 INJECTION, SOLUTION INTRAVENOUS PRN
Status: DISCONTINUED | OUTPATIENT
Start: 2021-09-30 | End: 2021-09-30 | Stop reason: SDUPTHER

## 2021-09-30 RX ORDER — HEPARIN SODIUM (PORCINE) LOCK FLUSH IV SOLN 100 UNIT/ML 100 UNIT/ML
1 SOLUTION INTRAVENOUS EVERY 12 HOURS SCHEDULED
Status: DISCONTINUED | OUTPATIENT
Start: 2021-09-30 | End: 2021-10-04 | Stop reason: HOSPADM

## 2021-09-30 RX ORDER — HEPARIN SODIUM (PORCINE) LOCK FLUSH IV SOLN 100 UNIT/ML 100 UNIT/ML
3 SOLUTION INTRAVENOUS PRN
Status: DISCONTINUED | OUTPATIENT
Start: 2021-09-30 | End: 2021-09-30 | Stop reason: SDUPTHER

## 2021-09-30 RX ORDER — HEPARIN SODIUM (PORCINE) LOCK FLUSH IV SOLN 100 UNIT/ML 100 UNIT/ML
3 SOLUTION INTRAVENOUS EVERY 12 HOURS SCHEDULED
Status: DISCONTINUED | OUTPATIENT
Start: 2021-09-30 | End: 2021-09-30 | Stop reason: SDUPTHER

## 2021-09-30 RX ORDER — SODIUM CHLORIDE 0.9 % (FLUSH) 0.9 %
5-40 SYRINGE (ML) INJECTION PRN
Status: DISCONTINUED | OUTPATIENT
Start: 2021-09-30 | End: 2021-10-04 | Stop reason: HOSPADM

## 2021-09-30 RX ORDER — FENTANYL CITRATE 50 UG/ML
25 INJECTION, SOLUTION INTRAMUSCULAR; INTRAVENOUS EVERY 4 HOURS PRN
Status: DISCONTINUED | OUTPATIENT
Start: 2021-09-30 | End: 2021-10-04 | Stop reason: HOSPADM

## 2021-09-30 RX ORDER — SODIUM CHLORIDE 0.9 % (FLUSH) 0.9 %
5-40 SYRINGE (ML) INJECTION EVERY 12 HOURS SCHEDULED
Status: DISCONTINUED | OUTPATIENT
Start: 2021-09-30 | End: 2021-10-04 | Stop reason: HOSPADM

## 2021-09-30 RX ADMIN — PANTOPRAZOLE SODIUM 40 MG: 40 TABLET, DELAYED RELEASE ORAL at 09:21

## 2021-09-30 RX ADMIN — SEVELAMER CARBONATE 800 MG: 800 TABLET, FILM COATED ORAL at 09:22

## 2021-09-30 RX ADMIN — FENTANYL CITRATE 25 MCG: 0.05 INJECTION, SOLUTION INTRAMUSCULAR; INTRAVENOUS at 17:30

## 2021-09-30 RX ADMIN — SODIUM CHLORIDE: 9 INJECTION, SOLUTION INTRAVENOUS at 06:36

## 2021-09-30 RX ADMIN — FLUCONAZOLE 400 MG: 2 INJECTION, SOLUTION INTRAVENOUS at 18:33

## 2021-09-30 RX ADMIN — FOLIC ACID 1 MG: 1 TABLET ORAL at 09:21

## 2021-09-30 RX ADMIN — FENTANYL CITRATE 25 MCG: 0.05 INJECTION, SOLUTION INTRAMUSCULAR; INTRAVENOUS at 01:10

## 2021-09-30 RX ADMIN — SEVELAMER CARBONATE 800 MG: 800 TABLET, FILM COATED ORAL at 18:32

## 2021-09-30 RX ADMIN — ALBUMIN (HUMAN) 25 G: 0.25 INJECTION, SOLUTION INTRAVENOUS at 17:24

## 2021-09-30 RX ADMIN — INSULIN LISPRO 1 UNITS: 100 INJECTION, SOLUTION INTRAVENOUS; SUBCUTANEOUS at 09:25

## 2021-09-30 RX ADMIN — LEVOTHYROXINE SODIUM 75 MCG: 0.07 TABLET ORAL at 09:21

## 2021-09-30 RX ADMIN — ARIPIPRAZOLE 5 MG: 5 TABLET ORAL at 21:27

## 2021-09-30 RX ADMIN — FENTANYL CITRATE 25 MCG: 0.05 INJECTION, SOLUTION INTRAMUSCULAR; INTRAVENOUS at 21:39

## 2021-09-30 RX ADMIN — LOPERAMIDE HYDROCHLORIDE 4 MG: 2 CAPSULE ORAL at 01:16

## 2021-09-30 RX ADMIN — FENTANYL CITRATE 25 MCG: 0.05 INJECTION, SOLUTION INTRAMUSCULAR; INTRAVENOUS at 03:46

## 2021-09-30 RX ADMIN — METOPROLOL TARTRATE 25 MG: 25 TABLET, FILM COATED ORAL at 21:27

## 2021-09-30 RX ADMIN — PIPERACILLIN AND TAZOBACTAM 3375 MG: 3; .375 INJECTION, POWDER, FOR SOLUTION INTRAVENOUS at 03:03

## 2021-09-30 RX ADMIN — METOPROLOL TARTRATE 25 MG: 25 TABLET, FILM COATED ORAL at 09:22

## 2021-09-30 RX ADMIN — FENTANYL CITRATE 25 MCG: 0.05 INJECTION, SOLUTION INTRAMUSCULAR; INTRAVENOUS at 06:37

## 2021-09-30 RX ADMIN — LOPERAMIDE HYDROCHLORIDE 4 MG: 2 CAPSULE ORAL at 09:34

## 2021-09-30 RX ADMIN — HYDROXYZINE HYDROCHLORIDE 25 MG: 25 TABLET ORAL at 09:21

## 2021-09-30 RX ADMIN — MIRTAZAPINE 15 MG: 15 TABLET, FILM COATED ORAL at 21:28

## 2021-09-30 RX ADMIN — ROPINIROLE HYDROCHLORIDE 0.25 MG: 0.25 TABLET, FILM COATED ORAL at 21:28

## 2021-09-30 RX ADMIN — ALBUMIN (HUMAN) 25 G: 0.25 INJECTION, SOLUTION INTRAVENOUS at 01:28

## 2021-09-30 RX ADMIN — VANCOMYCIN HYDROCHLORIDE 1000 MG: 1 INJECTION, POWDER, LYOPHILIZED, FOR SOLUTION INTRAVENOUS at 21:30

## 2021-09-30 ASSESSMENT — PAIN SCALES - GENERAL
PAINLEVEL_OUTOF10: 0
PAINLEVEL_OUTOF10: 2
PAINLEVEL_OUTOF10: 7
PAINLEVEL_OUTOF10: 9
PAINLEVEL_OUTOF10: 8
PAINLEVEL_OUTOF10: 0
PAINLEVEL_OUTOF10: 9
PAINLEVEL_OUTOF10: 7
PAINLEVEL_OUTOF10: 4
PAINLEVEL_OUTOF10: 6

## 2021-09-30 ASSESSMENT — PAIN DESCRIPTION - PAIN TYPE
TYPE: ACUTE PAIN
TYPE: ACUTE PAIN

## 2021-09-30 ASSESSMENT — PAIN DESCRIPTION - DESCRIPTORS
DESCRIPTORS: ACHING;DISCOMFORT;CRAMPING
DESCRIPTORS: DISCOMFORT;CRAMPING

## 2021-09-30 ASSESSMENT — PAIN DESCRIPTION - LOCATION
LOCATION: ABDOMEN
LOCATION: ABDOMEN

## 2021-09-30 ASSESSMENT — PAIN - FUNCTIONAL ASSESSMENT: PAIN_FUNCTIONAL_ASSESSMENT: PREVENTS OR INTERFERES SOME ACTIVE ACTIVITIES AND ADLS

## 2021-09-30 ASSESSMENT — PAIN DESCRIPTION - FREQUENCY
FREQUENCY: INTERMITTENT
FREQUENCY: CONTINUOUS

## 2021-09-30 ASSESSMENT — PAIN DESCRIPTION - ONSET: ONSET: ON-GOING

## 2021-09-30 ASSESSMENT — PAIN DESCRIPTION - ORIENTATION: ORIENTATION: LEFT;RIGHT

## 2021-09-30 ASSESSMENT — PAIN DESCRIPTION - PROGRESSION: CLINICAL_PROGRESSION: GRADUALLY WORSENING

## 2021-09-30 NOTE — FLOWSHEET NOTE
09/30/21 1640   Vital Signs   BP (!) 168/96   Temp 97.6 °F (36.4 °C)   Pulse 107   Resp 16   Weight 145 lb 8.1 oz (66 kg)   Weight Method Bed scale   Percent Weight Change -4.35   Post-Hemodialysis Assessment   Post-Treatment Procedures Blood returned;Catheter capped, clamped with Citrate x 2 ports   Machine Disinfection Process Acid/Vinegar Clean;Heat Disinfect   Rinseback Volume (ml) 300 ml   Total Liters Processed (l/min) 3300 l/min   Dialyzer Clearance Moderately streaked   Duration of Treatment (minutes) 180 minutes   NET Removed (ml) 3000 ml   Tolerated Treatment Good   Patient Response to Treatment pt stable   Kidney Clearance Percent 58.7 %

## 2021-09-30 NOTE — PROGRESS NOTES
[address] [place], [place] [address]ist   Progress Note    Admitting Date and Time: 9/28/2021  9:36 AM  Admit Dx: ESRD (end stage renal disease) ([facility] [N18.6]  Sepsis ([facility] [A41.9]  Chronic renal failure syndrome, stage 4 (severe) (HCC) [N18.4]  Sepsis with acute organ dysfunction, due to unspecified organism, unspecified type, unspecified whether septic shock present ([facility] [A41.9, R65.20]    Subjective:    Pt feels the same as yesterday, very achy, tired, nauseous and has some abdominal pain. She also states she is coughing a little bit. Denies any fevers/chills or vomiting. States she still has pain where her paracentesis was done yesterday. Per RN: Patient to go for HD today    ROS: denies fever, chills, cp, sob, n/v, HA unless stated above.      lidocaine  5 mL IntraDERmal Once    sodium chloride flush  5-40 mL IntraVENous 2 times per day    heparin flush  1 mL IntraVENous 2 times per day    ARIPiprazole  5 mg Oral Nightly    [Held by provider] bumetanide  2 mg Oral BID    folic acid  1 mg Oral Daily    hydrOXYzine  25 mg Oral Daily    insulin glargine  10 Units SubCUTAneous BID    levothyroxine  75 mcg Oral Daily    metoclopramide  5 mg Oral TID    metoprolol tartrate  25 mg Oral BID    mirtazapine  15 mg Oral Nightly    pantoprazole  40 mg Oral QAM AC    rOPINIRole  0.25 mg Oral Nightly    sevelamer  800 mg Oral TID WC    [Held by provider] insulin lispro  3 Units SubCUTAneous TID     insulin lispro  0-6 Units SubCUTAneous TID WC    insulin lispro  0-3 Units SubCUTAneous Nightly    heparin (porcine)  5,000 Units SubCUTAneous BID    piperacillin-tazobactam  3,375 mg IntraVENous Q12H    fluconazole  400 mg IntraVENous Q24H    epoetin nena-epbx  3,000 Units SubCUTAneous Once per day on Mon Wed Fri    And    epoetin nena-epbx  2,000 Units SubCUTAneous Once per day on Mon Wed Fri    vancomycin (VANCOCIN) intermittent dosing (placeholder)   Other RX Placeholder    albumin human  25 g IntraVENous Q8H     fentanNYL, 25 mcg, Q4H PRN  sodium chloride flush, 5-40 mL, PRN  sodium chloride, 25 mL, PRN  heparin flush, 1 mL, PRN  loperamide, 4 mg, TID PRN  ipratropium-albuterol, 1 ampule, Q4H PRN  acetaminophen, 650 mg, Q6H PRN   Or  acetaminophen, 650 mg, Q6H PRN  glucose, 15 g, PRN  dextrose, 12.5 g, PRN  glucagon (rDNA), 1 mg, PRN  dextrose, 100 mL/hr, PRN  ondansetron, 4 mg, Q8H PRN   Or  ondansetron, 4 mg, Q6H PRN  polyethylene glycol, 17 g, Daily PRN         Objective:    BP (!) 164/87   Pulse 92   Temp 98.7 °F (37.1 °C) (Oral)   Resp 20   Ht 5' 4\" (1.626 m)   Wt 154 lb 6 oz (70 kg)   LMP  (LMP Unknown)   SpO2 96%   BMI 26.50 kg/m²      General Appearance: alert and oriented to person, place and time and in no acute distress, appears edematous in face and extremities  Skin: warm and dry, RIJ TDC in place, is slightly tender to palpation and dressing does not appear clean.  PD catheter does not appear infected  Head: normocephalic and atraumatic  Eyes: pupils equal, round, and reactive to light, extraocular eye movements intact, conjunctivae normal  Neck: neck supple and non tender without mass   Pulmonary/Chest: slight rales, poor inspiratory effort, no respiratory distress  Cardiovascular: normal rate, normal S1 and S2 and no carotid bruits  Abdomen: soft, very mildly tender, non-distended, normal bowel sounds, no masses or organomegaly  Extremities: no cyanosis, no clubbing and edema present in b/l LE  Neurologic: no cranial nerve deficit and speech normal however mumbled       Recent Labs     09/28/21  1000 09/29/21  0549 09/30/21  0934    134 136   K 4.1 4.2 4.7   CL 93* 96* 101   CO2 34* 30* 25   BUN 14 15 17   CREATININE 5.1* 5.7* 5.8*   GLUCOSE 228* 77 162*   CALCIUM 7.7* 7.6* 7.8*       Recent Labs     09/28/21  1000   ALKPHOS 213*   PROT 4.9*   LABALBU 2.0*   BILITOT <0.2   AST 13   ALT 11       Recent Labs     09/28/21  1000 09/29/21  0549 09/30/21  0935   WBC 15.3* 12.4* 9.7 RBC 2.73* 2.57* 2.45*   HGB 8.3* 7.8* 7.3*   HCT 25.6* 24.1* 22.9*   MCV 93.8 93.8 93.5   MCH 30.4 30.4 29.8   MCHC 32.4 32.4 31.9*   RDW 14.9 14.5 14.6    234 254   MPV 10.7 10.6 10.5       Respiratory Panel, Molecular, with COVID-19 (Restricted: peds pts or suitable admitted adults) [[id number]] (Abnormal)    Collected: 09/28/21 1415    Updated: 09/28/21 1652    Specimen Source: Nasopharyngeal     Adenovirus by PCR Not Detected    Bordetella parapertussis by PCR Not Detected    Bordetella pertussis by PCR Not Detected    Chlamydophilia pneumoniae by PCR Not Detected    Coronavirus 229E by PCR Not Detected    Coronavirus HKU1 by PCR Not Detected    Coronavirus NL63 by PCR Not Detected    Coronavirus OC43 by PCR Not Detected    SARS-CoV-2, PCR Not Detected    Human Metapneumovirus by PCR Not Detected    Human Rhinovirus/Enterovirus by PCR DETECTEDAbnormal     Influenza A by PCR Not Detected    Influenza B by PCR Not Detected    Mycoplasma pneumoniae by PCR Not Detected    Parainfluenza Virus 1 by PCR Not Detected    Parainfluenza Virus 2 by PCR Not Detected    Parainfluenza Virus 3 by PCR Not Detected    Parainfluenza Virus 4 by PCR Not Detected    Respiratory Syncytial Virus by PCR Not Detected         Radiology:   IR US GUIDED PARACENTESIS   Final Result   Successful paracentesis. US ABDOMEN LIMITED   Final Result   Mild-to-moderate amount of ascites. CT HEAD WO CONTRAST   Final Result   No acute intracranial abnormality. XR CHEST PORTABLE   Final Result   Basilar predominant linear/hazy opacities likely represent a component of   atelectasis. Pulmonary edema or atypical inflammation would be additional   considerations. IR INTERVENTIONAL RADIOLOGY PROCEDURE REQUEST    (Results Pending)       Assessment:  Active Problems:    Sepsis ([address]    ESRD (end stage renal disease) ([address]  Resolved Problems:    * No resolved hospital problems. *      Plan:  1.  Sepsis 2/2 bacteremia and/or SBP  -Patient was altered on presentation to ER, tachycardic, febrile, WBC 15  -BCx drawn in ER, CXR revealed bibasilar linear opacities, atelectasis vs pleural effusion vs inflammation.  -Procal elevated at 1.3, strep/legionella negative but patient tested positive for rhinovirus on RFA. WBC 15--> 12.4--> 9.7 today. Has been afebrile for over 48hrs  -ID on board, currently have patient on vanc, zosyn and diflucan  -Paracentesis done yesterday, revealed >250 neutrophils, so far culture showing no growth  -BCx growing coagulase negative staph, molecular ID showing MRSA  -Plan is to remove HD and PD catheters, however mother refusing to have done at our facility and wants transferred to [address] [place] [address]     2. AMS (resolved)  -Was altered at home, is now alert  -Likely 2/2 #1  -Will monitor mental status     3. Acute hypoxic respiratory failure  -Patient was reported to be in 80s on initial EMS eval  -Was 90% on RA in ER, placed on 2L O2 via NC and now 100%  -ABG revealed 7.476/46.6/40.3/33.6  -CXR with bibasilar linear opacities, will workup for possible PNA  -Still on 2L NC but saturating 96%, wean O2 as tolerated     4. DM  -History established, home meds lantus 10 units BID, lispro 3 units TIDAC and ISS  -Glucose 228 on admission, AG 6,  BHB 0.6, pH 7.476 - not in DKA or HHS  -Glucose this   -Will continue home meds and monitor FSBG     5. ESRD on HD  -Switched from PD to HD after last admission  -Still has PD catheter in, needs to be removed as likely has SBP  -Also needs to have HD cath removed and replaced as is bacteremic, mother is refusing to have done here  -Consulted nephro for HD, stated she will get HD today and then MWF  -Continue home sevelamer and retacrit     6. Hypothyroidism  -Continue home synthroid      7. MDD  -Continue home abilify and remeron     8.  Gastroparesis  -Continue home reglan     9. HFpEF  -Continue home bumex and metoprolol tartrate    NOTE: This report was transcribed using voice recognition software. Every effort was made to ensure accuracy; however, inadvertent computerized transcription errors may be present.      Electronically signed by Sulaiman Mcelroy MD on 9/30/2021 at 11:01 AM

## 2021-09-30 NOTE — PROGRESS NOTES
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 She was minimally responsive and hypoxemic. She has been admitted with a diagnosis of possible sepsis. She was given vancomycin and piperacillin-tazobactam in the ER. Patient reports having intermittent nausea and vomiting but denies any diarrhea, fever or chills. 9/29/21;  PATIENT FEELS BETTER TODAY , NO EMESIS    9/30/21:  Alert and denies any new complaints.     Current Medications:    Current Facility-Administered Medications: fentaNYL (SUBLIMAZE) injection 25 mcg, 25 mcg, IntraVENous, Q4H PRN  lidocaine 1 % injection 5 mL, 5 mL, IntraDERmal, Once  sodium chloride flush 0.9 % injection 5-40 mL, 5-40 mL, IntraVENous, 2 times per day  sodium chloride flush 0.9 % injection 5-40 mL, 5-40 mL, IntraVENous, PRN  0.9 % sodium chloride infusion, 25 mL, IntraVENous, PRN  heparin flush 100 UNIT/ML injection 100 Units, 1 mL, IntraVENous, 2 times per day  heparin flush 100 UNIT/ML injection 100 Units, 1 mL, IntraCATHeter, PRN  dextrose 5 % and 0.9 % sodium chloride infusion, , IntraVENous, Continuous  loperamide (IMODIUM) capsule 4 mg, 4 mg, Oral, TID PRN  ipratropium-albuterol (DUONEB) nebulizer solution 1 ampule, 1 ampule, Inhalation, Q4H PRN  ARIPiprazole (ABILIFY) tablet 5 mg, 5 mg, Oral, Nightly  [Held by provider] bumetanide (BUMEX) tablet 2 mg, 2 mg, Oral, BID  folic acid (FOLVITE) tablet 1 mg, 1 mg, Oral, Daily  hydrOXYzine (ATARAX) tablet 25 mg, 25 mg, Oral, Daily  insulin glargine (LANTUS) injection vial 10 Units, 10 Units, SubCUTAneous, BID  levothyroxine (SYNTHROID) tablet 75 mcg, 75 mcg, Oral, Daily  metoclopramide (REGLAN) tablet 5 mg, 5 mg, Oral, TID  metoprolol tartrate (LOPRESSOR) tablet 25 mg, 25 mg, Oral, BID  mirtazapine (REMERON) tablet 15 mg, 15 mg, Oral, Nightly  pantoprazole (PROTONIX) tablet 40 mg, 40 mg, Oral, QAM AC  rOPINIRole (REQUIP) tablet 0.25 mg, 0.25 mg, Oral, Nightly  sevelamer (RENVELA) tablet 800 mg, 800 mg, Oral, TID WC  acetaminophen (TYLENOL) tablet 650 mg, 650 mg, Oral, Q6H PRN **OR** acetaminophen (TYLENOL) suppository 650 mg, 650 mg, Rectal, Q6H PRN  glucose (GLUTOSE) 40 % oral gel 15 g, 15 g, Oral, PRN  dextrose 50 % IV solution, 12.5 g, IntraVENous, PRN  glucagon (rDNA) injection 1 mg, 1 mg, IntraMUSCular, PRN  dextrose 5 % solution, 100 mL/hr, IntraVENous, PRN  [Held by provider] insulin lispro (HUMALOG) injection vial 3 Units, 3 Units, SubCUTAneous, TID WC  insulin lispro (HUMALOG) injection vial 0-6 Units, 0-6 Units, SubCUTAneous, TID WC  insulin lispro (HUMALOG) injection vial 0-3 Units, 0-3 Units, SubCUTAneous, Nightly  ondansetron (ZOFRAN-ODT) disintegrating tablet 4 mg, 4 mg, Oral, Q8H PRN **OR** ondansetron (ZOFRAN) injection 4 mg, 4 mg, IntraVENous, Q6H PRN  polyethylene glycol (GLYCOLAX) packet 17 g, 17 g, Oral, Daily PRN  heparin (porcine) injection 5,000 Units, 5,000 Units, SubCUTAneous, BID  piperacillin-tazobactam (ZOSYN) 3,375 mg in dextrose 5 % 50 mL IVPB extended infusion (mini-bag), 3,375 mg, IntraVENous, Q12H  fluconazole (DIFLUCAN) in 0.9 % sodium chloride IVPB 400 mg, 400 mg, IntraVENous, Q24H  0.9 % sodium chloride infusion, , IntraVENous, Q12H  epoetin nena-epbx (RETACRIT) injection 3,000 Units, 3,000 Units, SubCUTAneous, Once per day on Mon Wed Fri **AND** epoetin nena-epbx (RETACRIT) injection 2,000 Units, 2,000 Units, SubCUTAneous, Once per day on Mon Wed Fri  vancomycin (VANCOCIN) intermittent dosing (placeholder), , Other, RX Placeholder  albumin human 25 % IV solution 25 g, 25 g, IntraVENous, Q8H  Allergies:  Cefepime and Toradol [ketorolac tromethamine]    Social History:    TOBACCO:   reports that she quit smoking about 8 months ago. Her smoking use included cigarettes. She has a 3.00 pack-year smoking history. She has never used smokeless tobacco.  ETOH:   reports no history of alcohol use.     Family History:       Problem Relation Age of Onset   [de-identified] Asthma Mother     Hypertension Mother     High Blood Pressure Mother     Diabetes Mother     Asthma Brother     High Blood Pressure Father      REVIEW OF SYSTEMS:    CONSTITUTIONAL:  positive for  fatigue and malaise  EYES:  negative  HEENT:  negative for  hearing loss and epistaxis  RESPIRATORY:  positive for dyspnea  CARDIOVASCULAR:  negative for  chest pain, dyspnea  GASTROINTESTINAL:  positive for nausea  GENITOURINARY:  negative  INTEGUMENT/BREAST:  negative  HEMATOLOGIC/LYMPHATIC:  negative  ALLERGIC/IMMUNOLOGIC:  positive for drug reactions  ENDOCRINE:  positive for weight changes    MUSCULOSKELETAL:  negative  NEUROLOGICAL:  negative        PHYSICAL EXAM:      Vitals:    VITALS:  BP (!) 164/87   Pulse 92   Temp 98.7 °F (37.1 °C) (Oral)   Resp 20   Ht 5' 4\" (1.626 m)   Wt 154 lb 6 oz (70 kg)   LMP  (LMP Unknown)   SpO2 96%   BMI 26.50 kg/m²   24HR INTAKE/OUTPUT:      Intake/Output Summary (Last 24 hours) at 9/30/2021 1022  Last data filed at 9/30/2021 0932  Gross per 24 hour   Intake 720 ml   Output 850 ml   Net -130 ml       PD Catheter Exam:  PD catheter exit site clean    Constitutional: Awake in no acute distress  HEENT:  Normocephalic, PERRL  Respiratory: Decreased breath sounds on the basis  Cardiovascular/Edema:  RRR, S1/S2  Gastrointestinal:  Soft, PD catheter exit site clean   Neurologic:  Nonfocal, AVELAR  Skin:  Warm, dry, no lesions  Other:  no edema     DATA:    CBC:   Lab Results   Component Value Date    WBC 9.7 09/30/2021    RBC 2.45 09/30/2021    HGB 7.3 09/30/2021    HCT 22.9 09/30/2021    MCV 93.5 09/30/2021    MCH 29.8 09/30/2021    MCHC 31.9 09/30/2021    RDW 14.6 09/30/2021     09/30/2021    MPV 10.5 09/30/2021     CMP:    Lab Results   Component Value Date     09/29/2021    K 4.2 09/29/2021    CL 96 09/29/2021    CO2 30 09/29/2021    BUN 15 09/29/2021    CREATININE 5.7 09/29/2021    GFRAA 11 09/29/2021    LABGLOM 11 09/29/2021    GLUCOSE 77 09/29/2021    GLUCOSE 130 05/18/2012    PROT 4.9 09/28/2021    LABALBU 2.0 09/28/2021    LABALBU 4.1 05/18/2012    CALCIUM 7.6 09/29/2021    BILITOT <0.2 09/28/2021    ALKPHOS 213 09/28/2021    AST 13 09/28/2021    ALT 11 09/28/2021     Magnesium:    Lab Results   Component Value Date    MG 1.7 09/28/2021     Phosphorus:    Lab Results   Component Value Date    PHOS 5.9 09/14/2021     Radiology Review:         CXR 9/08/2021   No acute cardiopulmonary process.               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 She was minimally responsive and hypoxemic. She has been admitted with a diagnosis of possible sepsis. She was given vancomycin and piperacillin-tazobactam in the ER. Patient reports having intermittent nausea and vomiting but denies any diarrhea, fever or chills. 1. ESRD on home HD dialysis training, to continue HD 3 times a week while in the hospital.  2. Metabolic alkalosis (pH 0.984, bicarb level 34) due to combination of vomiting and dialysis, with respiratory compensation  3. HTN, on metoprolol  4. MBD of CKD, on sevelamer   5. Anemia of CKD, on epoetin alpha    6. Restless leg syndrome, on ropinirole  -------------------------------------------------------------------  5. Altered mental status, resolved, hypoglycemia? 6. Possible sepsis, procalcitonin level 1.33, has received piperacillin-tazobactam and vancomycin  7. History of gastroparesis, on metoclopramide  8. Sepsis /peritonitis    Plan:    · HD today then remove HD access due t line sepsis  · Remove PD catheter due to peritonitis  · Monitor daily labs  · Albumin 25 g IV every 8 hours x2 days  · Epoetin alpha 5000 units 3 times a week    Thank you Dr. Vasquez Brown for allowing us to participate in the care of Ms.  [address]    Electronically signed by Shy Mckeon MD on 9/30/2021 at 10:22 AM      Discussed with ID and RN and Dr Vasquez Brown

## 2021-09-30 NOTE — CONSULTS
GENERAL SURGERY  CONSULT NOTE  9/30/2021    Physician Consulted: Dr. Nirali Scott  Reason for Consult: Removal PD cath  Referring Physician: Dr. Raza Kim is a 36 y.o. female with history as below who presented with AMS. She states she has been feeling sick recently with fatigue, cough, sore throat and abdominal pain. Abdominal pain has been present for last 1-2 weeks and is worse in her RLQ. She had a PD catheter placed about a year ago but has recently changed to HD dialysis and has a tunneled line in place. Peritoneal fluid was sampled and revealed increased cell count concerning for infection. General surgery is consulted for removal of PD catheter as she is no longer using it and it could be a source of recurrent infections. Patient is agreeable to removal but wants to be transferred to [address] [place] [address].       Past Medical History:   Diagnosis Date    Acute congestive heart failure ([address]     BC (acute kidney injury) ([address] 10/01/2019    Cardiac arrest ([address] 02/15/2021    Cephalgia 10/09/2019    Chronic kidney disease     Depression     Diabetes mellitus ([address]     Diabetic gastroparesis associated with type 1 diabetes mellitus ([address] 12/17/2018    Diabetic ketoacidosis ([address] 08/27/2011    Diabetic ketoacidosis with coma associated with type 1 diabetes mellitus ([address] 06/26/2013    Diabetic polyneuropathy associated with type 1 diabetes mellitus ([address] 12/27/2020    Drug use complicating pregnancy in third trimester     Endocarditis 10/31/2020    ESRD (end stage renal disease) ([address] 09/29/2020    H/O cardiovascular stress test 08/27/2021    Lexiscan    [facility]od[facility])     History of blood transfusion 11/2019    Hyperlipidemia 10/08/2020    Hyperosmolar hyperglycemic state (HHS) ([address] 11/20/2020    Hypothyroidism 10/08/2020    Iron deficiency anemia 10/01/2019    MDRO (multiple drug resistant organisms) resistance     MRSA (methicillin resistant Staphylococcus aureus)     back wound abcess    Non compliance w medication regimen 2016    Other disorders of kidney and ureter     Pregnancy 2016    16 weeks    Previous  delivery affecting pregnancy, antepartum 2017    Previous stillbirth or demise, antepartum 2016    Seizure ([address] 2020    Severe pre-eclampsia in third trimester 2016    Shock liver 02/15/2021    Valvular endocarditis 11/10/2020    This Diagnosis was added to the Problem List based on transcribed orders from Dr. Jeanne Sue          Past Surgical History:   Procedure Laterality Date    BACK SURGERY      abscess   [address]      x2    CHOLECYSTECTOMY, LAPAROSCOPIC N/A 2019    CHOLECYSTECTOMY LAPAROSCOPIC performed by Yoly Verdin MD at [address] [place] [address] N/A 2018    COLONOSCOPY WITH BIOPSY performed by Angie Romero MD at [address] [facility] N/A 2018    COLONOSCOPY WITH BIOPSY performed by Stephan Lantigua MD at [address]  2012    EF 57%    ECHO COMPL W DOP COLOR FLOW  6/10/2013         EMBOLECTOMY N/A 2020    [address] [place] [address], [id number] [place] [address], YULISSA -- REQS ROOM 3 performed by Stevie Rodas MD at [address] [place] [address] Left 2021    LEFT LEG INCISION AND DRAINAGE, DEBRIDEMENT, WOUND VAC APPLICATION performed by Joseluis Fermin DPM at [address] [place] [address] Left 2021    LEFT LEG DEBRIDEMENT BIOPSY POSS APPLICATION WOUND VAC performed by Joseluis Fermin DPM at [place] [address] N/A 2020    LAPAROSCOPIC INSERTION PERITONEAL DIALYSIS CATHETER performed by Yareli Leija MD at [facility] ESOPHAGOGASTRODUODENOSCOPY TRANSORAL DIAGNOSTIC N/A 2018    EGD ESOPHAGOGASTRODUODENOSCOPY performed by Angie Romero MD at [address] [place] [address] TRANSESOPHAGEAL ECHOCARDIOGRAM N/A 10/19/2020    TRANSESOPHAGEAL ECHOCARDIOGRAM WITH BUBBLE STUDY performed by Jessica Anthony MD at [address] [facility] TUNNELED VENOUS PORT PLACEMENT  04/2018    UPPER GASTROINTESTINAL ENDOSCOPY  12/18/2018    EGD BIOPSY performed by Jose Beck MD at [address] [facility] 10/11/2019    EGD ESOPHAGOGASTRODUODENOSCOPY performed by Michel Luna DO at [address] [facility] N/A 7/14/2021    EGD BIOPSY performed by Christal Rausch MD at [place] [address]       Medications Prior to Admission:    Prior to Admission medications    Medication Sig Start Date End Date Taking? Authorizing Provider   bumetanide (BUMEX) 2 MG tablet Take 2 mg by mouth 2 times daily   Yes Historical Provider, MD   glucagon 1 MG injection Inject 1 kit into the muscle as needed (Low Blood Sugar)   Yes Historical Provider, MD   pantoprazole (PROTONIX) 40 MG tablet Take 40 mg by mouth daily as needed (Reflux)   Yes Historical Provider, MD   insulin glargine (LANTUS) 100 UNIT/ML injection vial Inject 10 Units into the skin 2 times daily 9/15/21  Yes Yuliana Badillo MD   gentamicin (GARAMYCIN) 0.1 % cream Apply topically 3 times daily. 9/15/21  Yes Yuliana Badillo MD   vitamin D (ERGOCALCIFEROL) 1.25 MG (46108 UT) CAPS capsule Take 1 capsule by mouth once a week 9/3/21  Yes Harley Mac MD   sevelamer (RENVELA) 800 MG tablet Take 1 tablet by mouth 3 times daily (with meals) New lower dose 7/2/21  Yes Harley Mac MD   insulin lispro, 1 Unit Dial, (HUMALOG KWIKPEN) 100 UNIT/ML SOPN Inject 3 units with meals + sliding scale.  MAX 30U/day 6/1/21  Yes Lavonne Nageotte, MD   mirtazapine (REMERON) 15 MG tablet Take 15 mg by mouth nightly   Yes Historical Provider, MD   ARIPiprazole (ABILIFY) 5 MG tablet Take 5 mg by mouth nightly 4/18/21  Yes Historical Provider, MD   glucose (GLUTOSE) 40 % GEL Take 15 g by mouth as needed (Low Blood Sugar)    Yes Historical Provider, MD   levothyroxine (SYNTHROID) 75 MCG tablet TAKE 1 TABLET BY MOUTH IN THE MORNING BEFORE BREAKFAST 21  Yes Jacinda Littlejohn,    rOPINIRole (REQUIP) 0.25 MG tablet Take 0.25 mg by mouth nightly   Yes Historical Provider, MD   metoprolol tartrate (LOPRESSOR) 25 MG tablet Take 25 mg by mouth 2 times daily    Historical Provider, MD   polyethylene glycol (GLYCOLAX) 17 g packet Take 17 g by mouth daily as needed for Constipation    Historical Provider, MD       Allergies   Allergen Reactions    Cefepime Other (See Comments)     \" neurological side effect- slurred speech and confusion    Toradol [Ketorolac Tromethamine] Anaphylaxis and Hives       Family History   Problem Relation Age of Onset    Asthma Mother     Hypertension Mother     High Blood Pressure Mother     Diabetes Mother     Asthma Brother     High Blood Pressure Father        Social History     Tobacco Use    Smoking status: Former Smoker     Packs/day: 0.50     Years: 6.00     Pack years: 3.00     Types: Cigarettes     Quit date: 2021     Years since quittin.6    Smokeless tobacco: Never Used    Tobacco comment: vaper cig   Vaping Use    Vaping Use: Never used   Substance Use Topics    Alcohol use: No    Drug use: Yes     Types: Opiates      Comment: documented prior history of opioid abuse         Review of Systems   General ROS: positive for  - fatigue  Hematological and Lymphatic ROS: negative  Respiratory ROS: negative  Cardiovascular ROS: negative  Gastrointestinal ROS: positive for - abdominal pain and appetite loss  Genito-Urinary ROS: negative  Musculoskeletal ROS: negative      PHYSICAL EXAM:    Vitals:    21 1640   BP: (!) 168/96   Pulse: 107   Resp: 16   Temp: 97.6 °F (36.4 °C)   SpO2:        General Appearance:  awake, alert, oriented, in no acute distress  Skin:  Skin color, texture, turgor normal. No rashes or lesions. Head/face:  NCAT. Facial edema  Eyes:  No gross abnormalities. Lungs:  Normal expansion. Clear to auscultation. Heart:  tachycardic  Abdomen:  Soft, non-distended.  Moderate abdominal tenderness diffusely. PD cath in place with no surrounding erythema or drainage   Extremities: Extremities warm to touch, pink, with bilateral edema    LABS:    CBC  Recent Labs     09/30/21  0935   WBC 9.7   HGB 7.3*   HCT 22.9*        BMP  Recent Labs     09/30/21  0934      K 4.7      CO2 25   BUN 17   CREATININE 5.8*   CALCIUM 7.8*     Liver Function  Recent Labs     09/28/21  1000   BILITOT <0.2   AST 13   ALT 11   ALKPHOS 213*   PROT 4.9*   LABALBU 2.0*     No results for input(s): LACTATE in the last 72 hours. Recent Labs     09/28/21  1000   INR 1.1       RADIOLOGY    CT HEAD WO CONTRAST    Result Date: 9/28/2021  EXAMINATION: CT OF THE HEAD WITHOUT CONTRAST  9/28/2021 11:05 am TECHNIQUE: CT of the head was performed without the administration of intravenous contrast. Dose modulation, iterative reconstruction, and/or weight based adjustment of the mA/kV was utilized to reduce the radiation dose to as low as reasonably achievable. COMPARISON: CT head 02/15/2021 HISTORY: ORDERING SYSTEM PROVIDED HISTORY: ams TECHNOLOGIST PROVIDED HISTORY: Has a \"code stroke\" or \"stroke alert\" been called? ->No Reason for exam:->ams Decision Support Exception - unselect if not a suspected or confirmed emergency medical condition->Emergency Medical Condition (MA) FINDINGS: There are no areas of abnormal attenuation within the brain parenchyma. No evidence of mass, mass effect, or midline shift. The ventricles and sulci are of normal size and configuration. No extra-axial fluid collections or acute hemorrhage. The gray-white differentiation appears preserved without evidence of acute cortical ischemia. The calvarium is intact. There is minimal mucosal thickening within the visualized portions of the maxillary sinuses. The remaining visualized paranasal sinuses and mastoid air cells are clear. No acute intracranial abnormality.      XR CHEST PORTABLE    Result Date: 9/28/2021  EXAMINATION: ONE XRAY VIEW OF THE CHEST 9/28/2021 7:06 am COMPARISON: One-view chest x-ray 09/08/2021 HISTORY: ORDERING SYSTEM PROVIDED HISTORY: ams TECHNOLOGIST PROVIDED HISTORY: Reason for exam:->ams FINDINGS: There is mild enlargement of the cardiac silhouette, which may be exaggerated by AP technique. The mediastinal contours are within normal limits. A large caliber right internal jugular central venous catheter terminates in the region of the right atrium. The lungs are mildly hypoinflated. There are basilar predominant linear/hazy opacities, right greater than left. No significant pleural effusions or pneumothorax. No osseous abnormality is identified. Surgical clips are present in the right upper quadrant. Basilar predominant linear/hazy opacities likely represent a component of atelectasis. Pulmonary edema or atypical inflammation would be additional considerations. US ABDOMEN LIMITED    Result Date: 9/29/2021  EXAMINATION: RIGHT UPPER QUADRANT ULTRASOUND 9/29/2021 12:08 pm COMPARISON: None. HISTORY: ORDERING SYSTEM PROVIDED HISTORY: ascites check FINDINGS: 17 grayscale sonographic images were submitted for review from the abdomen and pelvis. The solid organs are not formally evaluated. There is a small amount of ascites within the right upper quadrant and right lower quadrant. There is a small amount of ascites within the left upper quadrant and left lower quadrant. There is small to moderate amount of ascites within the pelvis. There is peritoneal catheter partially visualized within the pelvis. Mild-to-moderate amount of ascites.      IR US GUIDED PARACENTESIS    Result Date: 9/29/2021  PROCEDURE: PARACENTESIS WITHOUT IMAGE GUIDANCE US ABDOMEN LIMITED 9/29/2021 HISTORY: ORDERING SYSTEM PROVIDED HISTORY: paracentesis with diagnostic studies TECHNOLOGIST PROVIDED HISTORY: Reason for exam:->paracentesis with diagnostic studies TECHNIQUE: Informed consent was obtained after a detailed explanation of the procedure including risks, benefits, and alternatives. Universal protocol was followed. A limited ultrasound of the abdomen was performed. The right abdomen was prepped and draped in sterile fashion and local anesthesia was achieved with lidocaine. An 8 Maori needle sheath was advanced into ascites and paracentesis was performed. The patient tolerated the procedure well. FINDINGS: Limited ultrasound of the abdomen demonstrates ascites. A total of 1800 cc was removed. Fluid was sent to the lab for testing. Successful paracentesis.          ASSESSMENT:  36 y.o. female with sepsis, PD catheter in place - not in use    PLAN:  - will plan for PD cath removal tomorrow 10/1  - NPO at midnight    Discussed with Dr. Mckayla Blank    Electronically signed by Olman Carpenter MD on 9/30/21 at 5:13 PM EDT

## 2021-09-30 NOTE — PROGRESS NOTES
[facility]  [address] [place] [address]  [place], [address] [place] [address] [employer]  Phone [phone]   [phone]      Admit Date: 2021  9:36 AM  Pt Name: Ottoniel Guerrero  MRN: [id number]  : 1990  Reason for Consult:    Chief Complaint   Patient presents with    Altered Mental Status     to er via ems from home unresponsive. last known well was 0100 today per ems     Requesting Physician:  Luz Andrade MD  PCP: Tova Ramírez MD  History Obtained From:  patient, chart   ID consulted for ESRD (end stage renal disease) ([address] [N18.6]  Sepsis ([address] [A41.9]  Chronic renal failure syndrome, stage 4 (severe) ([address] [N18.4]  Sepsis with acute organ dysfunction, due to unspecified organism, unspecified type, unspecified whether septic shock present ([address] [A41.9, R65.20]  on hospital day 2  CHIEF COMPLAINT       Chief Complaint   Patient presents with    Altered Mental Status     to er via ems from home unresponsive.  last known well was 0100 today per ems     HISTORYOF PRESENT ILLNESS   Ottoniel Guerrero is a 36 y.o. female who presents with   has a past medical history of Acute congestive heart failure ([address] BC (acute kidney injury) ([address] Cardiac arrest ([address] Cephalgia, Chronic kidney disease, Depression, Diabetes mellitus ([address] Diabetic gastroparesis associated with type 1 diabetes mellitus ([address] Diabetic ketoacidosis ([address] Diabetic ketoacidosis with coma associated with type 1 diabetes mellitus ([address] Diabetic polyneuropathy associated with type 1 diabetes mellitus ([address] Drug use complicating pregnancy in third trimester, Endocarditis, ESRD (end stage renal disease) ([address] H/O cardiovascular stress test, Hemodialysis patient ([address] History of blood transfusion, Hyperlipidemia, Hyperosmolar hyperglycemic state (HHS) ([address] Hypothyroidism, Iron deficiency anemia, MDRO (multiple drug resistant organisms) resistance, MRSA (methicillin resistant Staphylococcus aureus), Non compliance w medication regimen, Other disorders of kidney and ureter, Pregnancy, Previous  delivery affecting pregnancy, antepartum, Previous stillbirth or demise, antepartum, Seizure ([address] Severe pre-eclampsia in third trimester, Shock liver, and Valvular endocarditis. ED TRIAGEVITALS  BP: (!) 164/87, Temp: 98.7 °F (37.1 °C), Pulse: 92, Resp: 20, SpO2: 96 %  HPI  Pt was recently d/c on 9/15 after being rx for hyperglycemia   She was transitioned from PD to [facility] cath  Patient had left foot pain and was found to have fractures of 2nd-4th proximal phalanges and was seen by podiatry and fit in a surgical shoe.   Pt comes in now with change of MS, o2 at 80%  Wbc15.3 hgb8.3 cr5.1  ua neg   Admitted with sepsis  received piptazo/vanco   PT WAS SEEN IN ER LETHARGIC BUT ABLE TO RESPOND SOME  HA RTDC DRESSING NOT CLEAN   HAS DORAN YELLOW URINE CLEAR   NO ABD PAIN NP RASH/WOUNDS  NO DIARRHEA    CURRENT VISIT  2021  PT IN BED SHE FEELS THE SAME ON HER SIDE ON O2   MOTHER ON PHONE SPEAKER UPDATED CONCERNED AND POC OF POSS CLABSI/PERITONITIS  MOTHER WILL SPEAK TO RENAL   REVIEW OF SYSTEMS     CONSTITUTIONAL:   No fever, chills, weight loss  ALLERGIES:    No urticaria, hay fever,    EYES:     No blurry vision, loss of vision, eye pain  ENT:      No hearing loss, sore throat  CARDIOVASCULAR:   No chest pain or palpitations  RESPIRATORY:   No cough, sob  ENDOCRINE:    No increase thirst, urination   HEME-LYMPH:   No easy bruising or bleeding  GI:     No nausea, vomiting or diarrhea  :     No urinary complaints  NEURO:    No seizures, stroke, HA  MUSCULOSKELETAL:  No muscle aches or pain, no joint pain  SKIN:     No rash or itch  PSYCH:    No depression or anxiety    Medications Prior to Admission: bumetanide (BUMEX) 2 MG tablet, Take 2 mg by mouth 2 times daily  glucagon 1 MG injection, Inject 1 kit into the muscle as needed (Low Blood Sugar)  pantoprazole (PROTONIX) 40 MG tablet, Take 40 mg by mouth daily as needed (Reflux)  insulin glargine (LANTUS) 100 UNIT/ML injection vial, Inject 10 Units into the skin 2 times daily  gentamicin (GARAMYCIN) 0.1 % cream, Apply topically 3 times daily. vitamin D (ERGOCALCIFEROL) 1.25 MG (00245 UT) CAPS capsule, Take 1 capsule by mouth once a week  sevelamer (RENVELA) 800 MG tablet, Take 1 tablet by mouth 3 times daily (with meals) New lower dose  insulin lispro, 1 Unit Dial, (HUMALOG KWIKPEN) 100 UNIT/ML SOPN, Inject 3 units with meals + sliding scale.  MAX 30U/day  mirtazapine (REMERON) 15 MG tablet, Take 15 mg by mouth nightly  ARIPiprazole (ABILIFY) 5 MG tablet, Take 5 mg by mouth nightly  glucose (GLUTOSE) 40 % GEL, Take 15 g by mouth as needed (Low Blood Sugar)   levothyroxine (SYNTHROID) 75 MCG tablet, TAKE 1 TABLET BY MOUTH IN THE MORNING BEFORE BREAKFAST  rOPINIRole (REQUIP) 0.25 MG tablet, Take 0.25 mg by mouth nightly  metoprolol tartrate (LOPRESSOR) 25 MG tablet, Take 25 mg by mouth 2 times daily  polyethylene glycol (GLYCOLAX) 17 g packet, Take 17 g by mouth daily as needed for Constipation  CURRENT MEDICATIONS     Current Facility-Administered Medications:     fentaNYL (SUBLIMAZE) injection 25 mcg, 25 mcg, IntraVENous, Q4H PRN, Aleah Burns MD    lidocaine 1 % injection 5 mL, 5 mL, IntraDERmal, Once, Aleah Burns MD    sodium chloride flush 0.9 % injection 5-40 mL, 5-40 mL, IntraVENous, 2 times per day, Aleah Burns MD    sodium chloride flush 0.9 % injection 5-40 mL, 5-40 mL, IntraVENous, PRN, Aleah Burns MD    0.9 % sodium chloride infusion, 25 mL, IntraVENous, PRN, Aleah Burns MD    heparin flush 100 UNIT/ML injection 100 Units, 1 mL, IntraVENous, 2 times per day, Aleah Burns MD    heparin flush 100 UNIT/ML injection 100 Units, 1 mL, IntraCATHeter, PRN, Aleah Burns MD    dextrose 5 % and 0.9 % sodium chloride infusion, , IntraVENous, Continuous, Daniel Almazan MD    loperamide (IMODIUM) capsule 4 mg, 4 mg, Oral, TID PRN, Jem Beets, DO, 4 mg at 09/30/21 0934    ipratropium-albuterol (DUONEB) nebulizer solution 1 ampule, 1 ampule, Inhalation, Q4H PRN, Jem Ljs, DO, 1 ampule at 09/29/21 2348    ARIPiprazole (ABILIFY) tablet 5 mg, 5 mg, Oral, Nightly, Ruslan Vargas MD, 5 mg at 09/29/21 2115    [Held by provider] bumetanide (BUMEX) tablet 2 mg, 2 mg, Oral, BID, Ruslan Vargas MD, 2 mg at 85/47/28 7838    folic acid (FOLVITE) tablet 1 mg, 1 mg, Oral, Daily, Ruslan Vargas MD, 1 mg at 09/30/21 5947    hydrOXYzine (ATARAX) tablet 25 mg, 25 mg, Oral, Daily, Ruslan Vargas MD, 25 mg at 09/30/21 0921    insulin glargine (LANTUS) injection vial 10 Units, 10 Units, SubCUTAneous, BID, Ruslan Vargas MD, 10 Units at 09/29/21 5946    levothyroxine (SYNTHROID) tablet 75 mcg, 75 mcg, Oral, Daily, Ruslan Vargas MD, 75 mcg at 09/30/21 2095    metoclopramide (REGLAN) tablet 5 mg, 5 mg, Oral, TID, Ruslan Vargas MD, 5 mg at 09/29/21 2114    metoprolol tartrate (LOPRESSOR) tablet 25 mg, 25 mg, Oral, BID, Ruslan Vargas MD, 25 mg at 09/30/21 2201    mirtazapine (REMERON) tablet 15 mg, 15 mg, Oral, Nightly, Ruslan Vargas MD, 15 mg at 09/29/21 2115    pantoprazole (PROTONIX) tablet 40 mg, 40 mg, Oral, QAM AC, Ruslan Vargas MD, 40 mg at 09/30/21 5794    rOPINIRole (REQUIP) tablet 0.25 mg, 0.25 mg, Oral, Nightly, Ruslan Vargas MD, 0.25 mg at 09/29/21 2114    sevelamer (RENVELA) tablet 800 mg, 800 mg, Oral, TID WC, Ruslan Vargas MD, 800 mg at 09/30/21 1080    acetaminophen (TYLENOL) tablet 650 mg, 650 mg, Oral, Q6H PRN, 650 mg at 09/29/21 1802 **OR** acetaminophen (TYLENOL) suppository 650 mg, 650 mg, Rectal, Q6H PRN, Ruslan Vargas MD    glucose (GLUTOSE) 40 % oral gel 15 g, 15 g, Oral, PRN, Ruslan Vargas MD    dextrose 50 % IV solution, 12.5 g, IntraVENous, PRN, Ruslan Vargas MD, 12.5 g at 09/29/21 1805    glucagon (rDNA) injection 1 mg, 1 mg, IntraMUSCular, PRN, Perla Mcdowell MD    dextrose 5 % solution, 100 mL/hr, IntraVENous, PRN, Perla Mcdowell MD  [employer]  [facility] [place] by provider] insulin lispro (HUMALOG) injection vial 3 Units, 3 Units, SubCUTAneous, TID , Perla Mcdowell MD, 3 Units at 09/29/21 1332    insulin lispro (HUMALOG) injection vial 0-6 Units, 0-6 Units, SubCUTAneous, TID CANDY, Perla Mcdowell MD, 1 Units at 09/30/21 0925    insulin lispro (HUMALOG) injection vial 0-3 Units, 0-3 Units, SubCUTAneous, Nightly, Perla Mcdowell MD    ondansetron (ZOFRAN-ODT) disintegrating tablet 4 mg, 4 mg, Oral, Q8H PRN **OR** ondansetron (ZOFRAN) injection 4 mg, 4 mg, IntraVENous, Q6H PRN, Randol Severs, MD    polyethylene glycol (GLYCOLAX) packet 17 g, 17 g, Oral, Daily PRN, Randol Severs, MD    heparin (porcine) injection 5,000 Units, 5,000 Units, SubCUTAneous, BID, Randol Severs, MD, 5,000 Units at 09/29/21 2114    piperacillin-tazobactam (ZOSYN) 3,375 mg in dextrose 5 % 50 mL IVPB extended infusion (mini-bag), 3,375 mg, IntraVENous, Q12H, Talisha Key MD, Last Rate: 12.5 mL/hr at 09/30/21 0303, 3,375 mg at 09/30/21 0303    fluconazole (DIFLUCAN) in 0.9 % sodium chloride IVPB 400 mg, 400 mg, IntraVENous, Q24H, Talisha Key MD, Last Rate: 100 mL/hr at 09/29/21 2115, 400 mg at 09/29/21 2115    0.9 % sodium chloride infusion, , IntraVENous, Q12H, Talisha Key MD, Last Rate: 12.5 mL/hr at 09/30/21 0636, New Bag at 09/30/21 0636    epoetin nena-epbx (RETACRIT) injection 3,000 Units, 3,000 Units, SubCUTAneous, Once per day on Mon Wed Fri, 3,000 Units at 09/29/21 0858 **AND** epoetin nena-epbx (RETACRIT) injection 2,000 Units, 2,000 Units, SubCUTAneous, Once per day on Mon Wed Fri, Soco Abernathy MD, 2,000 Units at 09/29/21 0497    vancomycin (VANCOCIN) intermittent dosing (placeholder), , Other, RX Placeholder, Perla Mcdowell MD    albumin human 25 % IV solution 25 g, 25 g, IntraVENous, Q8H, Joe Jung MD, Stopped at 09/30/21 0228  ALLERGIES     Cefepime and Toradol [ketorolac tromethamine]  Immunization History   Administered Date(s) Administered    Influenza Virus Vaccine 10/22/2011, 10/23/2012    Influenza, Quadv, 6 mo and older, IM, PF (Flulaval, Fluarix) 12/15/2018    Influenza, Quadv, IM, PF (6 mo and older Fluzone, Flulaval, Fluarix, and 3 yrs and older Afluria) 03/16/2017, 09/29/2017    Tdap (Boostrix, Adacel) 07/19/2016, 08/26/2016, 06/24/2017      Internal Administration   First Dose      Second Dose           Last COVID Lab SARS-CoV-2 (no units)   Date Value   06/22/2020 Not Detected     SARS-CoV-2, PCR (no units)   Date Value   09/28/2021 Not Detected     SARS-CoV-2, NAAT (no units)   Date Value   09/28/2021 Not Detected            PAST MEDICAL HISTORY     Past Medical History:   Diagnosis Date    Acute congestive heart failure ([address]     BC (acute kidney injury) ([address] 10/01/2019    Cardiac arrest ([address] 02/15/2021    Cephalgia 10/09/2019    Chronic kidney disease     Depression     Diabetes mellitus ([address]     Diabetic gastroparesis associated with type 1 diabetes mellitus ([address] 12/17/2018    Diabetic ketoacidosis ([address] 08/27/2011    Diabetic ketoacidosis with coma associated with type 1 diabetes mellitus ([address] 06/26/2013    Diabetic polyneuropathy associated with type 1 diabetes mellitus ([address] 12/27/2020    Drug use complicating pregnancy in third trimester     Endocarditis 10/31/2020    ESRD (end stage renal disease) ([address] 09/29/2020    H/O cardiovascular stress test 08/27/2021    Lexiscan    Hemodialysis patient [facility])     History of blood transfusion 11/2019    Hyperlipidemia 10/08/2020    Hyperosmolar hyperglycemic state (HHS) ([address] 11/20/2020    Hypothyroidism 10/08/2020    Iron deficiency anemia 10/01/2019    MDRO (multiple drug resistant organisms) resistance     MRSA (methicillin resistant Staphylococcus aureus)     back wound abcess    Non compliance w medication regimen 03/30/2016  Other disorders of kidney and ureter     Pregnancy 2016    16 weeks    Previous  delivery affecting pregnancy, antepartum 2017    Previous stillbirth or demise, antepartum 2016    Seizure ([address] 2020    Severe pre-eclampsia in third trimester 2016    Shock liver 02/15/2021    Valvular endocarditis 11/10/2020    This Diagnosis was added to the Problem List based on transcribed orders from Dr. Charisma Massey       Past Surgical History:   Procedure Laterality Date    BACK SURGERY      abscess     SECTION      x2    CHOLECYSTECTOMY, LAPAROSCOPIC N/A 2019    CHOLECYSTECTOMY LAPAROSCOPIC performed by Prema Braun MD at [facility] N/A 2018    COLONOSCOPY WITH BIOPSY performed by Edgardo Powers MD at [address] N/A 2018    COLONOSCOPY WITH BIOPSY performed by Viktoriya Rawls MD at [address] [facility]  2012    EF 57%    ECHO COMPL W DOP COLOR FLOW  6/10/2013         EMBOLECTOMY N/A 2020    [address] [place] [address], [address] [facility], YULISSA -- REQS ROOM 3 performed by [id number] Andrey Plascencia MD at [address] Left 2021    LEFT LEG INCISION AND DRAINAGE, DEBRIDEMENT, WOUND VAC APPLICATION performed by Дмитрий Baptiste DPM at [address] Left 2021    LEFT LEG DEBRIDEMENT BIOPSY POSS APPLICATION WOUND VAC performed by Дмитрий Baptiste DPM at [place] [address] N/A 2020    LAPAROSCOPIC INSERTION PERITONEAL DIALYSIS CATHETER performed by Laurie Alvarez MD at [place] [address] ESOPHAGOGASTRODUODENOSCOPY TRANSORAL DIAGNOSTIC N/A 2018    EGD ESOPHAGOGASTRODUODENOSCOPY performed by Edgardo Powers MD at [address] [facility] TRANSESOPHAGEAL ECHOCARDIOGRAM N/A 10/19/2020    TRANSESOPHAGEAL ECHOCARDIOGRAM WITH BUBBLE STUDY performed by Kalina Barraza MD at [address] [facility] [address] [address]  2018    UPPER GASTROINTESTINAL ENDOSCOPY  12/18/2018    EGD BIOPSY performed by Sue Hayes MD at [address] 10/11/2019    EGD ESOPHAGOGASTRODUODENOSCOPY performed by Herminio Gonzales DO at [address] 7/14/2021    EGD BIOPSY performed by Miquel Sierra MD at [address] [facility] [place]:    09/29/21 2324 09/29/21 2345 09/30/21 0600 09/30/21 0637   BP:    (!) 164/87   Pulse:    92   Resp:  12  20   Temp:    98.7 °F (37.1 °C)   TempSrc:    Oral   SpO2: 100% 100%  96%   Weight:   154 lb 6 oz (70 kg)    Height:         Physical Exam   Constitutional/General: awakr on her side  Head: NC/AT  Eyes: PERRL, EOMI    Pulmonary: Lungs DEC  to auscultation bilaterally. Not in respiratory distress  Cardiovascular:  Regular rate and rhythm   Abdomen: Soft, + BS. No distension. Pd cath   Extremities: Moves all extremities x 4. Warm and well perfused EDEMA  Skin: Warm and dry without rash  Neurologic:    No focal deficits  RTDC ? TENDER HAS OLD SCAR AROUND IT      DIAGNOSTIC RESULTS   RADIOLOGY:   XR FOOT LEFT (MIN 3 VIEWS)    Result Date: 9/10/2021  EXAMINATION: THREE XRAY VIEWS OF THE LEFT FOOT 9/10/2021 4:26 pm COMPARISON: 03/23/2020 HISTORY: ORDERING SYSTEM PROVIDED HISTORY: Attention to base of 2nd and 3rd toes. TECHNOLOGIST PROVIDED HISTORY: Reason for exam:->Attention to base of 2nd and 3rd toes. FINDINGS: Fractures involving the proximal portions of the proximal phalanx 2nd through 5th digits of unknown acuity. There is normal alignment of the tarsometatarsal joints. No acute joint abnormality. No focal osseous lesion. No focal soft tissue abnormality. Fractures proximal portions of the proximal phalanx of the 2nd through 5th digits of unknown acuity.      CT HEAD WO CONTRAST    Result Date: 9/28/2021  EXAMINATION: CT OF THE HEAD WITHOUT CONTRAST  9/28/2021 11:05 am TECHNIQUE: CT of the head was performed without the administration of intravenous contrast. Dose modulation, iterative reconstruction, and/or weight based adjustment of the mA/kV was utilized to reduce the radiation dose to as low as reasonably achievable. COMPARISON: CT head 02/15/2021 HISTORY: ORDERING SYSTEM PROVIDED HISTORY: ams TECHNOLOGIST PROVIDED HISTORY: Has a \"code stroke\" or \"stroke alert\" been called? ->No Reason for exam:->ams Decision Support Exception - unselect if not a suspected or confirmed emergency medical condition->Emergency Medical Condition ([place]) FINDINGS: There are no areas of abnormal attenuation within the brain parenchyma. No evidence of mass, mass effect, or midline shift. The ventricles and sulci are of normal size and configuration. No extra-axial fluid collections or acute hemorrhage. The gray-white differentiation appears preserved without evidence of acute cortical ischemia. The calvarium is intact. There is minimal mucosal thickening within the visualized portions of the maxillary sinuses. The remaining visualized paranasal sinuses and mastoid air cells are clear. No acute intracranial abnormality. IR FLUORO GUIDED CVA DEVICE PLMT/REPLACE/REMOVAL    Addendum Date: 9/21/2021    ADDENDUM: No addendum required for billing. It states in the report that a image was stored. Result Date: 9/21/2021  PROCEDURE: ULTRASOUND GUIDED VASCULAR ACCESS. FLUOROSCOPY GUIDED Tunneled dialysis catheter placement 9/15/2021. HISTORY: ORDERING SYSTEM PROVIDED HISTORY: TDC placement TECHNOLOGIST PROVIDED HISTORY: Reason for exam:->TDC placement SEDATION: The administration, documentation and monitoring of IV conscious sedation was under my direct supervision for time frame of 25 minutes. 1 mg of IV Versed and 50 mcg of IV fentanyl was administered. Radiology nursing staff monitored the patient throughout the entire examination. FLUOROSCOPY DOSE AND TYPE OR TIME AND EXPOSURES: Fluoroscopy time equals 0.9 minutes.   Total dose equals 12 mGy. TECHNIQUE: Informed consent was obtained after a detailed explanation of the procedure including risks, benefits, and alternatives. Universal protocol was observed. The right arm was prepped and draped in sterile fashion using maximum sterile barrier technique. Local anesthesia was achieved with lidocaine. A micropuncture needle was used to access the right brachial vein using ultrasound guidance. An ultrasound image demonstrating patency of the vein with needle tip located within it. An image was obtained and stored in PACs. A 0.018 guidewire was used to place a peel-a-way sheath and a  PICC was advanced with fluoroscopic guidance with the tip at the cavo-atrial junction. The catheter flushed easily and there was a good blood return. The catheter was secured to the skin. The patient tolerated the procedure well and there were no immediate complications. FINDINGS: The patient's neck was prepped and draped in a sterile fashion using maximum sterile barrier technique. Ultrasound images demonstrate a patent right internal jugular vein. Following the uneventful administration of lidocaine using ultrasound guidance I introduced a micro puncture needle into the right internal jugular vein. Through the micro needle a microwire was advanced. Over the microwire a micro sheath was placed. Through the micro sheath an Amplatz wire was advanced into the superior vena cava. 2 dilators were used to dilate a tract into the right internal jugular vein. I then anesthetized a tract along the chest wall measuring 10 cm. Using a blunt tunneling device I tunneled the catheter to the dilator within the right internal jugular vein. Over the Amplatz wire pill away sheath was placed. Through the peel-away sheath the dual lumen 15 Frisian 28 cm dialysis catheter was placed. The catheter tip is positioned at the SVC right atrial junction. The patient tolerated the procedure well and there were no complications.  The catheter was then secured to the skin with 2 0 silk suture. The incision overlying the right internal jugular vein was also sutured with 2 0 silk suture. Successful placement of a tunneled dialysis catheter via the right internal jugular vein. Administration of conscious sedation. XR CHEST PORTABLE    Result Date: 9/28/2021  EXAMINATION: ONE XRAY VIEW OF THE CHEST 9/28/2021 7:06 am COMPARISON: One-view chest x-ray 09/08/2021 HISTORY: ORDERING SYSTEM PROVIDED HISTORY: ams TECHNOLOGIST PROVIDED HISTORY: Reason for exam:->ams FINDINGS: There is mild enlargement of the cardiac silhouette, which may be exaggerated by AP technique. The mediastinal contours are within normal limits. A large caliber right internal jugular central venous catheter terminates in the region of the right atrium. The lungs are mildly hypoinflated. There are basilar predominant linear/hazy opacities, right greater than left. No significant pleural effusions or pneumothorax. No osseous abnormality is identified. Surgical clips are present in the right upper quadrant. Basilar predominant linear/hazy opacities likely represent a component of atelectasis. Pulmonary edema or atypical inflammation would be additional considerations. XR CHEST PORTABLE    Result Date: 9/8/2021  EXAMINATION: ONE XRAY VIEW OF THE CHEST 9/8/2021 1:18 am COMPARISON: August 25, 2021. HISTORY: ORDERING SYSTEM PROVIDED HISTORY: SOB, normal lung exam TECHNOLOGIST PROVIDED HISTORY: Reason for exam:->SOB, normal lung exam FINDINGS: Frontal portable view of the chest.  Normal lung volume. No focal airspace disease. Normal pulmonary vasculature. No pleural effusion or pneumothorax. Stable cardiomediastinal silhouette and great vessels. Multiple old bilateral rib fracture deformities. .     No acute cardiopulmonary process.      US DUP UPPER EXTREMITIES BILATERAL VENOUS    Result Date: 9/15/2021  EXAMINATION: DUPLEX ULTRASOUND OF THE BILATERAL UPPER EXTREMITIES FOR DVT, 09/28/21  1415   PROCAL  --  1.33*   INR 1.1  --    PROTIME 12.4  --    AST 13  --    ALT 11  --      Lab Results   Component Value Date    CHOL 95 01/14/2020    TRIG 82 01/14/2020    HDL 33 01/14/2020    LDLCALC 46 01/14/2020    LABVLDL 16 01/14/2020         Lab Results   Component Value Date    HEPAIGM Non-Reactive 09/15/2021    HEPBIGM Non-Reactive 09/15/2021    HCVABI Non-Reactive 09/15/2021     Hep C Ab Interp   Date Value Ref Range Status   09/15/2021 Non-Reactive Non-Reactive Final        MICROBIOLOGY:     Cultures :   Lab Results   Component Value Date    [facility]  09/28/2021     This organism was isolated in one set. Susceptibility testing is not routinely done as this  organism frequently represents skin contamination. Additional testing can be ordered by calling the  Microbiology Department. BC 5 Days no growth 06/21/2021    BC 5 Days no growth 05/14/2021    ORG Staphylococcus coagulase-negative 09/28/2021    ORG Serratia marcescens 07/11/2021    ORG Enterococcus faecium 06/29/2021     Lab Results   Component Value Date    BLOODCULT2 24 Hours no growth 09/28/2021    BLOODCULT2 5 Days no growth 06/21/2021    BLOODCULT2 5 Days no growth 05/14/2021    ORG Staphylococcus coagulase-negative 09/28/2021    ORG Serratia marcescens 07/11/2021    ORG Enterococcus faecium 06/29/2021       WOUND/ABSCESS   Date Value Ref Range Status   05/16/2021   Final    Heavy growth  Refer to previous culture (CXWND: Leg-Left collected on 5-14-21 at 1426)  for susceptibility results     05/14/2021   Final    Heavy growth  Methicillin resistant Staph aureus isolated. Most Methicillin  resistant Staphylococcus are usually resistant to multiple  antibiotics including other B-Lactams, Aminoglycosides,  Macrolides, Clindamycin and Tetracycline. Contact isolation  is indicated.      02/22/2021 Growth not present  Final       Smear, Respiratory   Date Value Ref Range Status   02/18/2021   Final    Group 6: <25 PMN's/LPF and <25 last known well was 0100 today per ems    and admitted for ESRD (end stage renal disease) ([address] [N18.6]  Sepsis ([facility] [A41.9]  Chronic renal failure syndrome, stage 4 (severe) ([address] [N18.4]  Sepsis with acute organ dysfunction, due to unspecified organism, unspecified type, unspecified whether septic shock present ([facility] [A41.9, R65.20]     Leukocytosis/fevers ELEVATED PROCAL   eval for sepsis  Still has pd cath eval for peritonitis and clabsi with hd cath and cons bacteremia   RHINOVIRUS  H/o urine 7/11 Serratia, 6/29 vre  H/o mrsa left le now healed  H/o cdiff 10/2020.,/2020    CHECK BLOOD/URINE CX  FUNGITELL     mother may be resistant to lines being removed  Spoke with Dr Eladio Gómez nurse       piperacillin-tazobactam (ZOSYN) 3,375 mg in dextrose 5 % 50 mL IVPB extended infusion (mini-bag), Q12H  fluconazole (DIFLUCAN) in 0.9 % sodium chloride IVPB 400 mg, Q24H     vancomycin (VANCOCIN) intermittent dosing (placeholder), RX Placeholder       Adjust atbx when cx final     Imaging and labs were reviewed per medical records and any ID pertinent labs were addressed with the patient. The patient/FAMILY  was educated about the diagnosis, prognosis, indications, risks and benefits of treatment. An opportunity to ask questions was given to the patient/FAMILY and questions were answered. Thank you for involving me in the care of [address] [place] [address]. Please do not hesitate to call for any questions or concerns.          Electronically signed by Jelena Osei MD on 9/30/2021 at 10:02 AM

## 2021-09-30 NOTE — PROGRESS NOTES
Pharmacy Consultation Note  (Antibiotic Dosing and Monitoring)    Initial consult date: 21  Consulting physician: Dr Juhi Duke  Drug(s): Vancomycin IV  Indication: Pneumonia, Positive blood culture    Ht Readings from Last 1 Encounters:   21 5' 4\" (1.626 m)     Wt Readings from Last 1 Encounters:   21 154 lb 6 oz (70 kg)     Age/  Gender Act BW IBW DW  Allergy Information   36 y.o.   female 69.1 kg 54.7 kg 60.5 kg  Cefepime and Toradol [ketorolac tromethamine]          Date  WBC HD Drug/Dose Time   Given Level(s)   (Time) Comments     (#1) 15.3 -- Vancomycin 1000 mg IV x 1 1216       (#2) 12.4 HD x 50 minutes No dose -- Random @ 0549 = 18.4 mcg/mL      (#3) 9.7 HD today Vancomycin 1000 mg IV x 1 after dialysis <2100>       (#4)           (#5)           (#6)           (#7)           Estimated Creatinine Clearance: 14 mL/min (A) (based on SCr of 5.8 mg/dL (H)). UOP over the past 24 hours:       Intake/Output Summary (Last 24 hours) at 2021 1336  Last data filed at 2021 0932  Gross per 24 hour   Intake 660 ml   Output 850 ml   Net -190 ml       Temp max: Temp (24hrs), Av.6 °F (37 °C), Min:98.3 °F (36.8 °C), Max:98.7 °F (37.1 °C)      Antibiotic Regimen:  Antibiotic Dose Date Initiated   Zosyn 3.375 g IV Q12H      Cultures:  available culture and sensitivity results were reviewed in EPIC  Cultures sent and are pending. Culture Date Result    Blood cx #1  MR-CONS   Blood cx #2  NGTD   Covid-19  Not detected   Respiratory Panel  Rhinovirus   Urine cx  No growth   Body fluid (ascitic)  No organisms seen     Assessment:  · Consulted by Dr. Juhi Duke to dose/monitor vancomycin  · Goal trough level:  15-20 mcg/mL  · Pt is a 37 y/o F with a Hx of noncompliance to home medications and frequent resultant hospital admissions. Admitted for decreased responsiveness and hypoxia  · Peritoneal dialysis outpatient.  Has Tessio to use for HD inpatient  · : Random @ 0549 = 18.4 mcg/mL  · 9/30: HD yesterday x only 50 minutes d/t malfunctioning cath.  Planning for HD today then removal of both HD/PD caths    Plan:  · Vancomycin 1,000 mg IV x 1 after dialysis  · Levels PRN  · Pharmacist will follow and monitor/adjust dosing as necessary      Thank you for the consult,    Jasmyne Coker, PharmD, BCPS 9/30/2021 1:37 PM   [phone]

## 2021-09-30 NOTE — PLAN OF CARE
Problem: Pain:  Goal: Pain level will decrease  Outcome: Met This Shift  Note: Pain is less than 3 after being medicated     Problem: Pain:  Goal: Control of acute pain  Outcome: Met This Shift     Problem: Pain:  Goal: Control of chronic pain  Outcome: Met This Shift     Problem: Falls - Risk of:  Goal: Will remain free from falls  Outcome: Met This Shift  Note: No falls     Problem: Falls - Risk of:  Goal: Absence of physical injury  Outcome: Met This Shift

## 2021-10-01 ENCOUNTER — ANESTHESIA (OUTPATIENT)
Dept: OPERATING ROOM | Age: 31
DRG: 711 | End: 2021-10-01
Payer: COMMERCIAL

## 2021-10-01 ENCOUNTER — APPOINTMENT (OUTPATIENT)
Dept: INTERVENTIONAL RADIOLOGY/VASCULAR | Age: 31
DRG: 711 | End: 2021-10-01
Payer: COMMERCIAL

## 2021-10-01 ENCOUNTER — ANESTHESIA EVENT (OUTPATIENT)
Dept: OPERATING ROOM | Age: 31
DRG: 711 | End: 2021-10-01
Payer: COMMERCIAL

## 2021-10-01 VITALS
OXYGEN SATURATION: 100 % | DIASTOLIC BLOOD PRESSURE: 87 MMHG | SYSTOLIC BLOOD PRESSURE: 122 MMHG | RESPIRATION RATE: 18 BRPM

## 2021-10-01 LAB
(1,3)-BETA-D-GLUCAN (FUNGITELL) INTERPRETATION: NEGATIVE
(1,3)-BETA-D-GLUCAN (FUNGITELL): 31 PG/ML
ABO/RH: NORMAL
ANION GAP SERPL CALCULATED.3IONS-SCNC: 7 MMOL/L (ref 7–16)
ANTIBODY SCREEN: NORMAL
BASOPHILS ABSOLUTE: 0.03 E9/L (ref 0–0.2)
BASOPHILS RELATIVE PERCENT: 0.4 % (ref 0–2)
BLOOD BANK DISPENSE STATUS: NORMAL
BLOOD BANK PRODUCT CODE: NORMAL
BPU ID: NORMAL
BUN BLDV-MCNC: 9 MG/DL (ref 6–20)
CALCIUM SERPL-MCNC: 8.5 MG/DL (ref 8.6–10.2)
CHLORIDE BLD-SCNC: 103 MMOL/L (ref 98–107)
CO2: 28 MMOL/L (ref 22–29)
CREAT SERPL-MCNC: 3.8 MG/DL (ref 0.5–1)
DESCRIPTION BLOOD BANK: NORMAL
EOSINOPHILS ABSOLUTE: 0.48 E9/L (ref 0.05–0.5)
EOSINOPHILS RELATIVE PERCENT: 7.1 % (ref 0–6)
GFR AFRICAN AMERICAN: 17
GFR NON-AFRICAN AMERICAN: 17 ML/MIN/1.73
GLUCOSE BLD-MCNC: 215 MG/DL (ref 74–99)
GLUCOSE BLD-MCNC: 90 MG/DL (ref 74–99)
GONADOTROPIN, CHORIONIC (HCG) QUANT: 2.8 MIU/ML
HCT VFR BLD CALC: 22.6 % (ref 34–48)
HCT VFR BLD CALC: 26.4 % (ref 34–48)
HEMOGLOBIN: 6.8 G/DL (ref 11.5–15.5)
HEMOGLOBIN: 8.4 G/DL (ref 11.5–15.5)
IMMATURE GRANULOCYTES #: 0.02 E9/L
IMMATURE GRANULOCYTES %: 0.3 % (ref 0–5)
LYMPHOCYTES ABSOLUTE: 1.33 E9/L (ref 1.5–4)
LYMPHOCYTES RELATIVE PERCENT: 19.6 % (ref 20–42)
MCH RBC QN AUTO: 29.3 PG (ref 26–35)
MCHC RBC AUTO-ENTMCNC: 30.1 % (ref 32–34.5)
MCV RBC AUTO: 97.4 FL (ref 80–99.9)
METER GLUCOSE: 189 MG/DL (ref 74–99)
METER GLUCOSE: 209 MG/DL (ref 74–99)
METER GLUCOSE: 229 MG/DL (ref 74–99)
METER GLUCOSE: 73 MG/DL (ref 74–99)
METER GLUCOSE: 83 MG/DL (ref 74–99)
METER GLUCOSE: <40 MG/DL (ref 74–99)
MONOCYTES ABSOLUTE: 0.41 E9/L (ref 0.1–0.95)
MONOCYTES RELATIVE PERCENT: 6 % (ref 2–12)
NEUTROPHILS ABSOLUTE: 4.52 E9/L (ref 1.8–7.3)
NEUTROPHILS RELATIVE PERCENT: 66.6 % (ref 43–80)
PDW BLD-RTO: 14.8 FL (ref 11.5–15)
PLATELET # BLD: 274 E9/L (ref 130–450)
PMV BLD AUTO: 10.4 FL (ref 7–12)
POTASSIUM REFLEX MAGNESIUM: 4.8 MMOL/L (ref 3.5–5)
RBC # BLD: 2.32 E12/L (ref 3.5–5.5)
SODIUM BLD-SCNC: 138 MMOL/L (ref 132–146)
WBC # BLD: 6.8 E9/L (ref 4.5–11.5)

## 2021-10-01 PROCEDURE — 6360000002 HC RX W HCPCS: Performed by: STUDENT IN AN ORGANIZED HEALTH CARE EDUCATION/TRAINING PROGRAM

## 2021-10-01 PROCEDURE — 6370000000 HC RX 637 (ALT 250 FOR IP): Performed by: SURGERY

## 2021-10-01 PROCEDURE — 2500000003 HC RX 250 WO HCPCS: Performed by: SURGERY

## 2021-10-01 PROCEDURE — 84702 CHORIONIC GONADOTROPIN TEST: CPT

## 2021-10-01 PROCEDURE — C1894 INTRO/SHEATH, NON-LASER: HCPCS

## 2021-10-01 PROCEDURE — 6370000000 HC RX 637 (ALT 250 FOR IP): Performed by: STUDENT IN AN ORGANIZED HEALTH CARE EDUCATION/TRAINING PROGRAM

## 2021-10-01 PROCEDURE — 85025 COMPLETE CBC W/AUTO DIFF WBC: CPT

## 2021-10-01 PROCEDURE — 36430 TRANSFUSION BLD/BLD COMPNT: CPT

## 2021-10-01 PROCEDURE — 36410 VNPNXR 3YR/> PHY/QHP DX/THER: CPT

## 2021-10-01 PROCEDURE — 3700000000 HC ANESTHESIA ATTENDED CARE: Performed by: SURGERY

## 2021-10-01 PROCEDURE — 2709999900 HC NON-CHARGEABLE SUPPLY: Performed by: SURGERY

## 2021-10-01 PROCEDURE — 82962 GLUCOSE BLOOD TEST: CPT

## 2021-10-01 PROCEDURE — 2580000003 HC RX 258

## 2021-10-01 PROCEDURE — 6360000002 HC RX W HCPCS: Performed by: SPECIALIST

## 2021-10-01 PROCEDURE — 2500000003 HC RX 250 WO HCPCS: Performed by: RADIOLOGY

## 2021-10-01 PROCEDURE — 76937 US GUIDE VASCULAR ACCESS: CPT

## 2021-10-01 PROCEDURE — 7100000001 HC PACU RECOVERY - ADDTL 15 MIN: Performed by: SURGERY

## 2021-10-01 PROCEDURE — 86901 BLOOD TYPING SEROLOGIC RH(D): CPT

## 2021-10-01 PROCEDURE — 3600000012 HC SURGERY LEVEL 2 ADDTL 15MIN: Performed by: SURGERY

## 2021-10-01 PROCEDURE — 87077 CULTURE AEROBIC IDENTIFY: CPT

## 2021-10-01 PROCEDURE — P9016 RBC LEUKOCYTES REDUCED: HCPCS

## 2021-10-01 PROCEDURE — 6360000002 HC RX W HCPCS: Performed by: SURGERY

## 2021-10-01 PROCEDURE — 3600000002 HC SURGERY LEVEL 2 BASE: Performed by: SURGERY

## 2021-10-01 PROCEDURE — 85014 HEMATOCRIT: CPT

## 2021-10-01 PROCEDURE — 3700000001 HC ADD 15 MINUTES (ANESTHESIA): Performed by: SURGERY

## 2021-10-01 PROCEDURE — 2580000003 HC RX 258: Performed by: SURGERY

## 2021-10-01 PROCEDURE — 82947 ASSAY GLUCOSE BLOOD QUANT: CPT

## 2021-10-01 PROCEDURE — 7100000000 HC PACU RECOVERY - FIRST 15 MIN: Performed by: SURGERY

## 2021-10-01 PROCEDURE — 76937 US GUIDE VASCULAR ACCESS: CPT | Performed by: RADIOLOGY

## 2021-10-01 PROCEDURE — 87070 CULTURE OTHR SPECIMN AEROBIC: CPT

## 2021-10-01 PROCEDURE — 6360000002 HC RX W HCPCS

## 2021-10-01 PROCEDURE — 87186 SC STD MICRODIL/AGAR DIL: CPT

## 2021-10-01 PROCEDURE — 99232 SBSQ HOSP IP/OBS MODERATE 35: CPT | Performed by: STUDENT IN AN ORGANIZED HEALTH CARE EDUCATION/TRAINING PROGRAM

## 2021-10-01 PROCEDURE — 02HV33Z INSERTION OF INFUSION DEVICE INTO SUPERIOR VENA CAVA, PERCUTANEOUS APPROACH: ICD-10-PCS | Performed by: RADIOLOGY

## 2021-10-01 PROCEDURE — 36556 INSERT NON-TUNNEL CV CATH: CPT | Performed by: RADIOLOGY

## 2021-10-01 PROCEDURE — 36415 COLL VENOUS BLD VENIPUNCTURE: CPT

## 2021-10-01 PROCEDURE — 2580000003 HC RX 258: Performed by: INTERNAL MEDICINE

## 2021-10-01 PROCEDURE — 6360000002 HC RX W HCPCS: Performed by: ANESTHESIOLOGY

## 2021-10-01 PROCEDURE — C1751 CATH, INF, PER/CENT/MIDLINE: HCPCS

## 2021-10-01 PROCEDURE — 80048 BASIC METABOLIC PNL TOTAL CA: CPT

## 2021-10-01 PROCEDURE — 5A1D70Z PERFORMANCE OF URINARY FILTRATION, INTERMITTENT, LESS THAN 6 HOURS PER DAY: ICD-10-PCS | Performed by: RADIOLOGY

## 2021-10-01 PROCEDURE — 85018 HEMOGLOBIN: CPT

## 2021-10-01 PROCEDURE — 86900 BLOOD TYPING SEROLOGIC ABO: CPT

## 2021-10-01 PROCEDURE — 77001 FLUOROGUIDE FOR VEIN DEVICE: CPT | Performed by: RADIOLOGY

## 2021-10-01 PROCEDURE — 2580000003 HC RX 258: Performed by: SPECIALIST

## 2021-10-01 PROCEDURE — 49422 REMOVE TUNNELED IP CATH: CPT | Performed by: SURGERY

## 2021-10-01 PROCEDURE — 2700000000 HC OXYGEN THERAPY PER DAY

## 2021-10-01 PROCEDURE — 86850 RBC ANTIBODY SCREEN: CPT

## 2021-10-01 PROCEDURE — 1200000000 HC SEMI PRIVATE

## 2021-10-01 PROCEDURE — 86923 COMPATIBILITY TEST ELECTRIC: CPT

## 2021-10-01 RX ORDER — FENTANYL CITRATE 50 UG/ML
25 INJECTION, SOLUTION INTRAMUSCULAR; INTRAVENOUS EVERY 5 MIN PRN
Status: DISCONTINUED | OUTPATIENT
Start: 2021-10-01 | End: 2021-10-01 | Stop reason: HOSPADM

## 2021-10-01 RX ORDER — DEXTROSE MONOHYDRATE 100 MG/ML
INJECTION, SOLUTION INTRAVENOUS CONTINUOUS
Status: DISCONTINUED | OUTPATIENT
Start: 2021-10-01 | End: 2021-10-02

## 2021-10-01 RX ORDER — MIDAZOLAM HYDROCHLORIDE 1 MG/ML
INJECTION INTRAMUSCULAR; INTRAVENOUS PRN
Status: DISCONTINUED | OUTPATIENT
Start: 2021-10-01 | End: 2021-10-01 | Stop reason: SDUPTHER

## 2021-10-01 RX ORDER — HEPARIN SODIUM (PORCINE) LOCK FLUSH IV SOLN 100 UNIT/ML 100 UNIT/ML
1 SOLUTION INTRAVENOUS PRN
Status: DISCONTINUED | OUTPATIENT
Start: 2021-10-01 | End: 2021-10-04 | Stop reason: HOSPADM

## 2021-10-01 RX ORDER — FENTANYL CITRATE 50 UG/ML
INJECTION, SOLUTION INTRAMUSCULAR; INTRAVENOUS
Status: COMPLETED
Start: 2021-10-01 | End: 2021-10-01

## 2021-10-01 RX ORDER — HYDROCODONE BITARTRATE AND ACETAMINOPHEN 5; 325 MG/1; MG/1
1 TABLET ORAL EVERY 4 HOURS PRN
Status: DISCONTINUED | OUTPATIENT
Start: 2021-10-01 | End: 2021-10-04 | Stop reason: HOSPADM

## 2021-10-01 RX ORDER — HYDROCODONE BITARTRATE AND ACETAMINOPHEN 5; 325 MG/1; MG/1
2 TABLET ORAL EVERY 4 HOURS PRN
Status: DISCONTINUED | OUTPATIENT
Start: 2021-10-01 | End: 2021-10-04 | Stop reason: HOSPADM

## 2021-10-01 RX ORDER — LIDOCAINE HYDROCHLORIDE 10 MG/ML
10 INJECTION, SOLUTION INFILTRATION; PERINEURAL ONCE
Status: COMPLETED | OUTPATIENT
Start: 2021-10-01 | End: 2021-10-01

## 2021-10-01 RX ORDER — HEPARIN SODIUM (PORCINE) LOCK FLUSH IV SOLN 100 UNIT/ML 100 UNIT/ML
1 SOLUTION INTRAVENOUS EVERY 12 HOURS SCHEDULED
Status: DISCONTINUED | OUTPATIENT
Start: 2021-10-01 | End: 2021-10-04 | Stop reason: HOSPADM

## 2021-10-01 RX ORDER — SODIUM CHLORIDE 0.9 % (FLUSH) 0.9 %
5-40 SYRINGE (ML) INJECTION PRN
Status: DISCONTINUED | OUTPATIENT
Start: 2021-10-01 | End: 2021-10-04 | Stop reason: HOSPADM

## 2021-10-01 RX ORDER — PROPOFOL 10 MG/ML
INJECTION, EMULSION INTRAVENOUS CONTINUOUS PRN
Status: DISCONTINUED | OUTPATIENT
Start: 2021-10-01 | End: 2021-10-01 | Stop reason: SDUPTHER

## 2021-10-01 RX ORDER — SODIUM CHLORIDE 9 MG/ML
INJECTION, SOLUTION INTRAVENOUS CONTINUOUS PRN
Status: DISCONTINUED | OUTPATIENT
Start: 2021-10-01 | End: 2021-10-01 | Stop reason: SDUPTHER

## 2021-10-01 RX ORDER — SODIUM CHLORIDE 0.9 % (FLUSH) 0.9 %
5-40 SYRINGE (ML) INJECTION EVERY 12 HOURS SCHEDULED
Status: DISCONTINUED | OUTPATIENT
Start: 2021-10-01 | End: 2021-10-04 | Stop reason: HOSPADM

## 2021-10-01 RX ORDER — SODIUM CHLORIDE 9 MG/ML
INJECTION, SOLUTION INTRAVENOUS PRN
Status: DISCONTINUED | OUTPATIENT
Start: 2021-10-01 | End: 2021-10-04 | Stop reason: HOSPADM

## 2021-10-01 RX ORDER — FENTANYL CITRATE 50 UG/ML
INJECTION, SOLUTION INTRAMUSCULAR; INTRAVENOUS PRN
Status: DISCONTINUED | OUTPATIENT
Start: 2021-10-01 | End: 2021-10-01 | Stop reason: SDUPTHER

## 2021-10-01 RX ORDER — SODIUM CHLORIDE 9 MG/ML
25 INJECTION, SOLUTION INTRAVENOUS PRN
Status: DISCONTINUED | OUTPATIENT
Start: 2021-10-01 | End: 2021-10-04 | Stop reason: HOSPADM

## 2021-10-01 RX ORDER — BUPIVACAINE HYDROCHLORIDE AND EPINEPHRINE 2.5; 5 MG/ML; UG/ML
INJECTION, SOLUTION EPIDURAL; INFILTRATION; INTRACAUDAL; PERINEURAL PRN
Status: DISCONTINUED | OUTPATIENT
Start: 2021-10-01 | End: 2021-10-01 | Stop reason: ALTCHOICE

## 2021-10-01 RX ADMIN — SODIUM CHLORIDE: 9 INJECTION, SOLUTION INTRAVENOUS at 06:58

## 2021-10-01 RX ADMIN — LEVOTHYROXINE SODIUM 75 MCG: 0.07 TABLET ORAL at 07:03

## 2021-10-01 RX ADMIN — PROPOFOL INJECTABLE EMULSION 75 MCG/KG/MIN: 10 INJECTION, EMULSION INTRAVENOUS at 12:22

## 2021-10-01 RX ADMIN — ROPINIROLE HYDROCHLORIDE 0.25 MG: 0.25 TABLET, FILM COATED ORAL at 22:23

## 2021-10-01 RX ADMIN — LIDOCAINE HYDROCHLORIDE 10 ML: 10 INJECTION, SOLUTION INFILTRATION; PERINEURAL at 14:36

## 2021-10-01 RX ADMIN — ARIPIPRAZOLE 5 MG: 5 TABLET ORAL at 22:23

## 2021-10-01 RX ADMIN — METOPROLOL TARTRATE 25 MG: 25 TABLET, FILM COATED ORAL at 15:32

## 2021-10-01 RX ADMIN — FENTANYL CITRATE 25 MCG: 0.05 INJECTION, SOLUTION INTRAMUSCULAR; INTRAVENOUS at 19:21

## 2021-10-01 RX ADMIN — DEXTROSE AND SODIUM CHLORIDE: 5; 900 INJECTION, SOLUTION INTRAVENOUS at 17:27

## 2021-10-01 RX ADMIN — MIRTAZAPINE 15 MG: 15 TABLET, FILM COATED ORAL at 22:24

## 2021-10-01 RX ADMIN — FENTANYL CITRATE 25 MCG: 0.05 INJECTION, SOLUTION INTRAMUSCULAR; INTRAVENOUS at 15:41

## 2021-10-01 RX ADMIN — PANTOPRAZOLE SODIUM 40 MG: 40 TABLET, DELAYED RELEASE ORAL at 07:03

## 2021-10-01 RX ADMIN — SODIUM CHLORIDE: 9 INJECTION, SOLUTION INTRAVENOUS at 12:22

## 2021-10-01 RX ADMIN — SEVELAMER CARBONATE 800 MG: 800 TABLET, FILM COATED ORAL at 17:27

## 2021-10-01 RX ADMIN — FENTANYL CITRATE 25 MCG: 0.05 INJECTION, SOLUTION INTRAMUSCULAR; INTRAVENOUS at 13:00

## 2021-10-01 RX ADMIN — FOLIC ACID 1 MG: 1 TABLET ORAL at 15:31

## 2021-10-01 RX ADMIN — SODIUM CHLORIDE, PRESERVATIVE FREE 10 ML: 5 INJECTION INTRAVENOUS at 20:17

## 2021-10-01 RX ADMIN — DEXTROSE MONOHYDRATE 12.5 G: 25 INJECTION, SOLUTION INTRAVENOUS at 20:15

## 2021-10-01 RX ADMIN — MIDAZOLAM 2 MG: 1 INJECTION INTRAMUSCULAR; INTRAVENOUS at 12:22

## 2021-10-01 RX ADMIN — SODIUM CHLORIDE, PRESERVATIVE FREE 100 UNITS: 5 INJECTION INTRAVENOUS at 15:47

## 2021-10-01 RX ADMIN — EPOETIN ALFA-EPBX 3000 UNITS: 3000 INJECTION, SOLUTION INTRAVENOUS; SUBCUTANEOUS at 15:31

## 2021-10-01 RX ADMIN — DEXTROSE MONOHYDRATE: 100 INJECTION, SOLUTION INTRAVENOUS at 22:24

## 2021-10-01 RX ADMIN — FENTANYL CITRATE 25 MCG: 0.05 INJECTION, SOLUTION INTRAMUSCULAR; INTRAVENOUS at 04:15

## 2021-10-01 RX ADMIN — INSULIN LISPRO 2 UNITS: 100 INJECTION, SOLUTION INTRAVENOUS; SUBCUTANEOUS at 17:29

## 2021-10-01 RX ADMIN — SODIUM CHLORIDE, PRESERVATIVE FREE 100 UNITS: 5 INJECTION INTRAVENOUS at 20:25

## 2021-10-01 RX ADMIN — FENTANYL CITRATE 25 MCG: 0.05 INJECTION, SOLUTION INTRAMUSCULAR; INTRAVENOUS at 09:34

## 2021-10-01 RX ADMIN — Medication 10 ML: at 20:17

## 2021-10-01 RX ADMIN — PIPERACILLIN AND TAZOBACTAM 3375 MG: 3; .375 INJECTION, POWDER, FOR SOLUTION INTRAVENOUS at 02:00

## 2021-10-01 RX ADMIN — FENTANYL CITRATE 50 MCG: 50 INJECTION, SOLUTION INTRAMUSCULAR; INTRAVENOUS at 12:35

## 2021-10-01 RX ADMIN — FLUCONAZOLE 400 MG: 2 INJECTION, SOLUTION INTRAVENOUS at 17:21

## 2021-10-01 RX ADMIN — METOPROLOL TARTRATE 25 MG: 25 TABLET, FILM COATED ORAL at 22:23

## 2021-10-01 RX ADMIN — EPOETIN ALFA-EPBX 2000 UNITS: 2000 INJECTION, SOLUTION INTRAVENOUS; SUBCUTANEOUS at 15:31

## 2021-10-01 RX ADMIN — FENTANYL CITRATE 50 MCG: 50 INJECTION, SOLUTION INTRAMUSCULAR; INTRAVENOUS at 12:22

## 2021-10-01 RX ADMIN — HYDROCODONE BITARTRATE AND ACETAMINOPHEN 2 TABLET: 5; 325 TABLET ORAL at 17:27

## 2021-10-01 RX ADMIN — FENTANYL CITRATE 25 MCG: 0.05 INJECTION, SOLUTION INTRAMUSCULAR; INTRAVENOUS at 12:50

## 2021-10-01 ASSESSMENT — PAIN DESCRIPTION - PROGRESSION
CLINICAL_PROGRESSION: GRADUALLY IMPROVING
CLINICAL_PROGRESSION: NOT CHANGED
CLINICAL_PROGRESSION: GRADUALLY IMPROVING

## 2021-10-01 ASSESSMENT — PAIN DESCRIPTION - ORIENTATION
ORIENTATION: RIGHT;MID
ORIENTATION: MID
ORIENTATION: RIGHT;MID
ORIENTATION: MID

## 2021-10-01 ASSESSMENT — PAIN DESCRIPTION - LOCATION
LOCATION: ABDOMEN;GROIN
LOCATION: ABDOMEN;GENERALIZED
LOCATION: ABDOMEN
LOCATION: ABDOMEN;GROIN

## 2021-10-01 ASSESSMENT — PAIN DESCRIPTION - FREQUENCY
FREQUENCY: INTERMITTENT
FREQUENCY: CONTINUOUS
FREQUENCY: INTERMITTENT
FREQUENCY: CONTINUOUS
FREQUENCY: INTERMITTENT
FREQUENCY: INTERMITTENT

## 2021-10-01 ASSESSMENT — PULMONARY FUNCTION TESTS
PIF_VALUE: 1
PIF_VALUE: 0
PIF_VALUE: 1
PIF_VALUE: 1
PIF_VALUE: 0
PIF_VALUE: 0
PIF_VALUE: 1
PIF_VALUE: 0
PIF_VALUE: 1
PIF_VALUE: 0
PIF_VALUE: 1
PIF_VALUE: 1

## 2021-10-01 ASSESSMENT — PAIN DESCRIPTION - DESCRIPTORS
DESCRIPTORS: DISCOMFORT;ACHING
DESCRIPTORS: ACHING
DESCRIPTORS: ACHING;CRAMPING;DISCOMFORT
DESCRIPTORS: ACHING
DESCRIPTORS: DISCOMFORT;CRAMPING
DESCRIPTORS: ACHING;DISCOMFORT

## 2021-10-01 ASSESSMENT — PAIN SCALES - GENERAL
PAINLEVEL_OUTOF10: 5
PAINLEVEL_OUTOF10: 5
PAINLEVEL_OUTOF10: 4
PAINLEVEL_OUTOF10: 8
PAINLEVEL_OUTOF10: 0
PAINLEVEL_OUTOF10: 7
PAINLEVEL_OUTOF10: 8
PAINLEVEL_OUTOF10: 8
PAINLEVEL_OUTOF10: 4
PAINLEVEL_OUTOF10: 5
PAINLEVEL_OUTOF10: 0
PAINLEVEL_OUTOF10: 10
PAINLEVEL_OUTOF10: 6
PAINLEVEL_OUTOF10: 9

## 2021-10-01 ASSESSMENT — PAIN DESCRIPTION - PAIN TYPE
TYPE: SURGICAL PAIN
TYPE: SURGICAL PAIN;CHRONIC PAIN
TYPE: SURGICAL PAIN
TYPE: CHRONIC PAIN;ACUTE PAIN
TYPE: ACUTE PAIN
TYPE: SURGICAL PAIN

## 2021-10-01 ASSESSMENT — PAIN DESCRIPTION - ONSET
ONSET: GRADUAL
ONSET: ON-GOING

## 2021-10-01 ASSESSMENT — LIFESTYLE VARIABLES: SMOKING_STATUS: 0

## 2021-10-01 NOTE — PROGRESS NOTES
Patient came down to special procedures for removal of tunneled dialysis catheter from right IJ/chest and placement of temporary femoral dialysis catheter. 1432 Procedure start /123 102 20 100% on 3 liters nasal canula      1441 Procedure end /122 97 21 100% on 3 liters nasal canula. Temporary dialysis catheter to right femoral.  2 x 2 tegaderm applied. nurse to nurse called to third floor, spoke with Alison Jerome RN    Patient transported back to the floor.

## 2021-10-01 NOTE — ANESTHESIA PRE PROCEDURE
Department of Anesthesiology  Preprocedure Note       Name:  Miquel Almanza   Age:  36 y.o.  :  1990                                          MRN:  [id number]         Date:  10/1/2021      Surgeon: Raphael Diaz):  Barby Jaffe MD    Procedure: Procedure(s):  PERITONEAL CATHETER REMOVAL    Medications prior to admission:   Prior to Admission medications    Medication Sig Start Date End Date Taking? Authorizing Provider   bumetanide (BUMEX) 2 MG tablet Take 2 mg by mouth 2 times daily   Yes Historical Provider, MD   glucagon 1 MG injection Inject 1 kit into the muscle as needed (Low Blood Sugar)   Yes Historical Provider, MD   pantoprazole (PROTONIX) 40 MG tablet Take 40 mg by mouth daily as needed (Reflux)   Yes Historical Provider, MD   insulin glargine (LANTUS) 100 UNIT/ML injection vial Inject 10 Units into the skin 2 times daily 9/15/21  Yes Sole Daniels MD   gentamicin (GARAMYCIN) 0.1 % cream Apply topically 3 times daily. 9/15/21  Yes Sole Daniels MD   vitamin D (ERGOCALCIFEROL) 1.25 MG (40679 UT) CAPS capsule Take 1 capsule by mouth once a week 9/3/21  Yes Vivian Lin MD   sevelamer (RENVELA) 800 MG tablet Take 1 tablet by mouth 3 times daily (with meals) New lower dose 21  Yes Vivian Lin MD   insulin lispro, 1 Unit Dial, (HUMALOG KWIKPEN) 100 UNIT/ML SOPN Inject 3 units with meals + sliding scale.  MAX 30U/day 21  Yes Alexia Kumar MD   mirtazapine (REMERON) 15 MG tablet Take 15 mg by mouth nightly   Yes Historical Provider, MD   ARIPiprazole (ABILIFY) 5 MG tablet Take 5 mg by mouth nightly 21  Yes Historical Provider, MD   glucose (GLUTOSE) 40 % GEL Take 15 g by mouth as needed (Low Blood Sugar)    Yes Historical Provider, MD   levothyroxine (SYNTHROID) 75 MCG tablet TAKE 1 TABLET BY MOUTH IN THE MORNING BEFORE BREAKFAST 21  Yes Jacinda Littlejohn DO   rOPINIRole (REQUIP) 0.25 MG tablet Take 0.25 mg by mouth nightly   Yes Historical Provider, MD metoprolol tartrate (LOPRESSOR) 25 MG tablet Take 25 mg by mouth 2 times daily    Historical Provider, MD   polyethylene glycol (GLYCOLAX) 17 g packet Take 17 g by mouth daily as needed for Constipation    Historical Provider, MD       Current medications:    Current Facility-Administered Medications   Medication Dose Route Frequency Provider Last Rate Last Admin    0.9 % sodium chloride infusion   IntraVENous PRN Saeid Taveras MD        lidocaine 1 % injection 5 mL  5 mL IntraDERmal Once Pedrito Huerta MD        sodium chloride flush 0.9 % injection 5-40 mL  5-40 mL IntraVENous 2 times per day Pedrito Huerta MD        sodium chloride flush 0.9 % injection 5-40 mL  5-40 mL IntraVENous PRN Pedrito Huerta MD        0.9 % sodium chloride infusion  25 mL IntraVENous PRN Pedrito Huerta MD        heparin flush 100 UNIT/ML injection 100 Units  1 mL IntraVENous 2 times per day Pedrito Huerta MD        heparin flush 100 UNIT/ML injection 100 Units  1 mL IntraCATHeter PRN Pedrito Huerta MD        fentaNYL (SUBLIMAZE) injection 25 mcg  25 mcg IntraVENous Q4H PRN Chandrika Bishop MD   25 mcg at 10/01/21 0415    lidocaine 1 % injection 5 mL  5 mL IntraDERmal Once Chandrika Bishop MD        sodium chloride flush 0.9 % injection 5-40 mL  5-40 mL IntraVENous 2 times per day Chandrika Bishop MD        sodium chloride flush 0.9 % injection 5-40 mL  5-40 mL IntraVENous PRN Chandrika Bishop MD        0.9 % sodium chloride infusion  25 mL IntraVENous PRN Chandrika Bishop MD        heparin flush 100 UNIT/ML injection 100 Units  1 mL IntraVENous 2 times per day Chandrika Bishop MD        heparin flush 100 UNIT/ML injection 100 Units  1 mL IntraCATHeter PRN Chandrika Bishop MD        dextrose 5 % and 0.9 % sodium chloride infusion   IntraVENous Continuous Pedrito Huerta MD        loperamide (IMODIUM) capsule 4 mg  4 mg Oral TID PRN Cj Mcgovern DO   4 mg at 09/30/21 0934    ipratropium-albuterol (DUONEB) nebulizer solution 1 ampule  1 ampule Inhalation Q4H PRN Christine Gonzalez DO   1 ampule at 09/29/21 2348    ARIPiprazole (ABILIFY) tablet 5 mg  5 mg Oral Nightly Tk Collazo MD   5 mg at 09/30/21 2127    [Held by provider] bumetanide (BUMEX) tablet 2 mg  2 mg Oral BID Tk Collazo MD   2 mg at 81/82/11 9211    folic acid (FOLVITE) tablet 1 mg  1 mg Oral Daily Tk Collazo MD   1 mg at 09/30/21 9144    hydrOXYzine (ATARAX) tablet 25 mg  25 mg Oral Daily Tk Collazo MD   25 mg at 09/30/21 1597    insulin glargine (LANTUS) injection vial 10 Units  10 Units SubCUTAneous BID Tk Collazo MD   10 Units at 09/29/21 0904    levothyroxine (SYNTHROID) tablet 75 mcg  75 mcg Oral Daily Tk Collazo MD   75 mcg at 10/01/21 0703    metoclopramide (REGLAN) tablet 5 mg  5 mg Oral TID Tk Collazo MD   5 mg at 09/29/21 2114    metoprolol tartrate (LOPRESSOR) tablet 25 mg  25 mg Oral BID Tk Collazo MD   25 mg at 09/30/21 2127    mirtazapine (REMERON) tablet 15 mg  15 mg Oral Nightly Tk Collazo MD   15 mg at 09/30/21 2128    pantoprazole (PROTONIX) tablet 40 mg  40 mg Oral QAM AC Tk Collazo MD   40 mg at 10/01/21 0703    rOPINIRole (REQUIP) tablet 0.25 mg  0.25 mg Oral Nightly Tk Collazo MD   0.25 mg at 09/30/21 2128    sevelamer (RENVELA) tablet 800 mg  800 mg Oral TID WC Tk Collazo MD   800 mg at 09/30/21 1832    acetaminophen (TYLENOL) tablet 650 mg  650 mg Oral Q6H PRN Tk Collazo MD   650 mg at 09/29/21 1802    Or    acetaminophen (TYLENOL) suppository 650 mg  650 mg Rectal Q6H PRN Tk Collazo MD        glucose (GLUTOSE) 40 % oral gel 15 g  15 g Oral PRN Tk Collazo MD        dextrose 50 % IV solution  12.5 g IntraVENous PRN Tk Collazo MD   12.5 g at 09/29/21 2193    glucagon (rDNA) injection 1 mg  1 mg IntraMUSCular PRN Tk Collazo MD        dextrose 5 % solution  100 mL/hr IntraVENous PRN Isa Resendez Marian Dennis MD       [facility] by provider] insulin lispro (HUMALOG) injection vial 3 Units  3 Units SubCUTAneous TID  Jarvis Marin MD   3 Units at 09/29/21 1332    insulin lispro (HUMALOG) injection vial 0-6 Units  0-6 Units SubCUTAneous TID  Jarvis Marin MD   1 Units at 09/30/21 0925    insulin lispro (HUMALOG) injection vial 0-3 Units  0-3 Units SubCUTAneous Nightly Jarvis Marin MD        ondansetron (ZOFRAN-ODT) disintegrating tablet 4 mg  4 mg Oral Q8H PRN Ottoniel Mclaughlin MD        Or    [facility]ron [facility] injection 4 mg  4 mg IntraVENous Q6H PRN Ottoniel Mclaughlin MD        polyethylene glycol [facility]) packet 17 g  17 g Oral Daily PRN Ottoniel Mclaughlin MD        heparin (porcine) injection 5,000 Units  5,000 Units SubCUTAneous BID Ottoniel Mclaughlin MD   5,000 Units at 09/29/21 2114    piperacillin-tazobactam (ZOSYN) 3,375 mg in dextrose 5 % 50 mL IVPB extended infusion (mini-bag)  3,375 mg IntraVENous Q12H Henderson Gilford, MD   Stopped at 10/01/21 0615    fluconazole (DIFLUCAN) in 0.9 % sodium chloride IVPB 400 mg  400 mg IntraVENous Q24H Henderson Gilford,  mL/hr at 09/30/21 1833 400 mg at 09/30/21 1833    0.9 % sodium chloride infusion   IntraVENous Q12H Henderson Gilford, MD 12.5 mL/hr at 10/01/21 0658 New Bag at 10/01/21 0658    epoetin nena-epbx (RETACRIT) injection 3,000 Units  3,000 Units SubCUTAneous Once per day on Mon Wed Fri Joe Hernandez MD   3,000 Units at 09/29/21 3593    And    epoetin nena-epbx (RETACRIT) injection 2,000 Units  2,000 Units SubCUTAneous Once per day on Mon Wed Fri Joaquina Aguilar MD   2,000 Units at 09/29/21 0858    vancomycin (VANCOCIN) intermittent dosing (placeholder)   Other Selena Srivastava MD           Allergies:     Allergies   Allergen Reactions    Cefepime Other (See Comments)     \" neurological side effect- slurred speech and confusion    Toradol [Ketorolac Tromethamine] Anaphylaxis and Hives       Problem List:    Patient Active Problem List   Diagnosis Code    Non compliance w medication regimen Z91.14    Tobacco smoking complicating pregnancy N38.744    MTHFR mutation Z15.89    Diabetic ketoacidosis without coma associated with type 1 diabetes mellitus ([facility]) E10.10    Hypertension I10    Prolonged QT interval R94.31    Iron deficiency anemia D50.9    Sinus tachycardia R00.0    Bladder dysfunction N31.9    Hypokalemia E87.6    Elevated troponin R77.8    Severe protein-calorie malnutrition ([facility]) E43    Uncontrolled type 1 diabetes mellitus with hyperglycemia, with long-term current use of insulin ([facility]) E10.65    End stage renal disease ([facility]) N18.6    Hypothyroidism E03.9    Hyperlipidemia E78.5    Acute cystitis N30.00    Nondisplaced fracture of neck of fifth metacarpal bone, left hand, initial encounter for closed fracture S62.367A    Chronic right-sided low back pain M54.50, G89.29    Endocarditis I38    Seizure ([facility]) R56.9    Hyperkalemia E87.5    Hypomagnesemia E83.42    Hypocalcemia E83.51    Intractable nausea and vomiting R11.2    Wound of left leg, sequela S81.802S    Syncope R55    Type 1 diabetes mellitus without complication ([facility]) U15.8    Hyperosmolality E87.0    Major depressive disorder F32.9    Lactic acid acidosis E87.2    Early satiety R68.81    Acute on chronic systolic CHF (congestive heart failure) ([facility]) I50.23    Atypical chest pain R07.89    Chronic renal failure syndrome N18.9    Sepsis ([facility]) A41.9    ESRD (end stage renal disease) ([facility]) N18.6       Past Medical History:        Diagnosis Date    [facility] failure [facility]     BC (acute kidney injury) ([facility] 10/01/2019    Cardiac arrest ([facility] 02/15/2021    Cephalgia 10/09/2019    Chronic kidney disease     Depression     Diabetes mellitus ([address]     Diabetic gastroparesis associated with type 1 diabetes mellitus ([facility] 12/17/2018    Diabetic ketoacidosis ([facility] 08/27/2011    Diabetic ketoacidosis with coma DRAINAGE, DEBRIDEMENT, WOUND VAC APPLICATION performed by Vivi Pickett DPM at [address] Left 2021    LEFT LEG DEBRIDEMENT BIOPSY POSS APPLICATION WOUND VAC performed by Vivi Pickett DPM at [place] [address] N/A 2020    LAPAROSCOPIC INSERTION PERITONEAL DIALYSIS CATHETER performed by Darwin Tom MD at [facility] ESOPHAGOGASTRODUODENOSCOPY TRANSORAL DIAGNOSTIC N/A 2018    EGD ESOPHAGOGASTRODUODENOSCOPY performed by Palak Jordan MD at [id number] [place] [address] TRANSESOPHAGEAL ECHOCARDIOGRAM N/A 10/19/2020    TRANSESOPHAGEAL ECHOCARDIOGRAM WITH BUBBLE STUDY performed by Pj Self MD at [address] [facility] [address] [address]  2018    UPPER GASTROINTESTINAL ENDOSCOPY  2018    EGD BIOPSY performed by Sue Hayes MD at [address] [place] [address] N/A 10/11/2019    EGD ESOPHAGOGASTRODUODENOSCOPY performed by Herminio Gonzales DO at [address] [place] [address] N/A 2021    EGD BIOPSY performed by Miquel Sierra MD at [address] History:    Social History     Tobacco Use    Smoking status: Former Smoker     Packs/day: 0.50     Years: 6.00     Pack years: 3.00     Types: Cigarettes     Quit date: 2021     Years since quittin.6    Smokeless tobacco: Never Used    Tobacco comment: vaper cig   Substance Use Topics    Alcohol use:  No                                Counseling given: Not Answered  Comment: vaper cig      Vital Signs (Current):   Vitals:    21 1600 21 1640 21 1800 10/01/21 0645   BP: (!) 168/89 (!) 168/96 (!) 167/80 (!) 169/93   Pulse: 110 107 110 103   Resp:  16 16 20   Temp:  97.6 °F (36.4 °C) 98.3 °F (36.8 °C) 97.9 °F (36.6 °C)   TempSrc:    Oral   SpO2:   96% 97%   Weight:  145 lb 8.1 oz (66 kg)     Height:                                                  BP Readings from Last 3 Encounters:   10/01/21 (!) 169/93   09/15/21 128/85   08/27/21 (!) 97/52       NPO Status:  GREATER THAN 8 HOURS                                                                               BMI:   Wt Readings from Last 3 Encounters:   09/30/21 145 lb 8.1 oz (66 kg)   09/15/21 169 lb 1.5 oz (76.7 kg)   08/27/21 154 lb 8.7 oz (70.1 kg)     Body mass index is 24.98 kg/m². CBC:   Lab Results   Component Value Date    WBC 6.8 10/01/2021    RBC 2.32 10/01/2021    HGB 6.8 10/01/2021    HCT 22.6 10/01/2021    MCV 97.4 10/01/2021    RDW 14.8 10/01/2021     10/01/2021       CMP:   Lab Results   Component Value Date     10/01/2021    K 4.8 10/01/2021     10/01/2021    CO2 28 10/01/2021    BUN 9 10/01/2021    CREATININE 3.8 10/01/2021    GFRAA 17 10/01/2021    LABGLOM 17 10/01/2021    GLUCOSE 215 10/01/2021    GLUCOSE 130 05/18/2012    PROT 4.9 09/28/2021    CALCIUM 8.5 10/01/2021    BILITOT <0.2 09/28/2021    ALKPHOS 213 09/28/2021    AST 13 09/28/2021    ALT 11 09/28/2021       POC Tests: No results for input(s): POCGLU, POCNA, POCK, POCCL, POCBUN, POCHEMO, POCHCT in the last 72 hours.     Coags:   Lab Results   Component Value Date    PROTIME 12.4 09/28/2021    PROTIME 13.6 08/14/2011    INR 1.1 09/28/2021    APTT 29.3 09/28/2021       HCG (If Applicable):   Lab Results   Component Value Date    PREGTESTUR NEGATIVE 11/03/2020    HCG 37977.4 (H) 06/26/2013        ABGs:   Lab Results   Component Value Date    PHART 7.345 07/20/2012    PO2ART 91.5 10/29/2019    MOF4CNV 35.0 10/29/2019    QWO7MFZ 14.0 02/15/2021    P7JHDMXH 97.7 09/08/2012        Type & Screen (If Applicable):  Lab Results   Component Value Date    LABABO O 12/29/2011    [address] POSITIVE 12/29/2011       Drug/Infectious Status (If Applicable):  No results found for: HIV, HEPCAB    COVID-19 Screening (If Applicable):   Lab Results   Component Value Date    COVID19 Not Detected 09/28/2021    COVID19 Not Detected 09/28/2021         Anesthesia Evaluation  Patient summary reviewed no history of anesthetic complications:   Airway: Mallampati: II  TM distance: >3 FB   Neck ROM: full  Mouth opening: > = 3 FB Dental: normal exam         Pulmonary: breath sounds clear to auscultation      (-) not a current smoker                          ROS comment: Recently intubated after cardiac arrest at home   Cardiovascular:    (+) hypertension:, valvular problems/murmurs:, CHF:,         Rhythm: regular  Rate: abnormal                    Neuro/Psych:   (+) seizures:, neuromuscular disease (diabetic neuropathy):, headaches:, psychiatric history: stable with treatmentdepression/anxiety             GI/Hepatic/Renal:   (+) liver disease:, renal disease: dialysis and ESRD,      (-) no morbid obesity       Endo/Other:    (+) Diabetes (Diabetic ketoacidosis with coma )Type I DM, poorly controlled, using insulin, hypothyroidism, blood dyscrasia: anemia:., .                 Abdominal:         (-) obese       Vascular: negative vascular ROS. Other Findings:               Anesthesia Plan      MAC     ASA 4     (PATIENT TO BE TRANSFUSED PRIOR TO OR)  Induction: intravenous. MIPS: Postoperative opioids intended and Prophylactic antiemetics administered. Anesthetic plan and risks discussed with patient. Plan discussed with CRNA.             Oumar Coulter DO  Staff Anesthesiologist  9:32 AM

## 2021-10-01 NOTE — PROGRESS NOTES
Sleeping quietly arouses easily Medicated for co pain with some relief Report called to RN 3rd floor Blood infusing LACF site benign No further drainage noted dressing Left abdomen

## 2021-10-01 NOTE — PROGRESS NOTES
GENERAL SURGERY  DAILY PROGRESS NOTE  10/1/2021    Chief Complaint   Patient presents with    Altered Mental Status     to er via ems from home unresponsive. last known well was 0100 today per ems       Subjective:  Pt doing ok this morning, complaining of body aches \"all over\".  No n/v    Objective:  BP (!) 167/80   Pulse 110   Temp 98.3 °F (36.8 °C)   Resp 16   Ht 5' 4\" (1.626 m)   Wt 145 lb 8.1 oz (66 kg)   LMP  (LMP Unknown)   SpO2 96%   BMI 24.98 kg/m²     GENERAL:  Laying in bed, awake, alert, cooperative  HEAD: Normocephalic, atraumatic  EYES: No sclera icterus, pupils equal  LUNGS:  No increased work of breathing  CARDIOVASCULAR:  Tachycardic   ABDOMEN:  Soft, diffuse tenderness, non-distended  EXTREMITIES: No edema or swelling  SKIN: Warm and dry    Assessment/Plan:  36 y.o. female with sepsis, PD catheter requiring removal     - plan for removal of PD catheter today  - NPO for procedure, ok for diet from surgery standpoint after      Electronically signed by Sudheer Valencia MD on 10/1/2021 at 6:48 AM

## 2021-10-01 NOTE — PROGRESS NOTES
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 She was minimally responsive and hypoxemic. She has been admitted with a diagnosis of possible sepsis. She was given vancomycin and piperacillin-tazobactam in the ER. Patient reports having intermittent nausea and vomiting but denies any diarrhea, fever or chills. 9/29/21;  PATIENT FEELS BETTER TODAY , NO EMESIS    9/30/21:  Alert and denies any new complaints.   10/1/21:  Tired and hungry no SOB    Current Medications:    Current Facility-Administered Medications: 0.9 % sodium chloride infusion, , IntraVENous, PRN  lidocaine 1 % injection 5 mL, 5 mL, IntraDERmal, Once  sodium chloride flush 0.9 % injection 5-40 mL, 5-40 mL, IntraVENous, 2 times per day  sodium chloride flush 0.9 % injection 5-40 mL, 5-40 mL, IntraVENous, PRN  0.9 % sodium chloride infusion, 25 mL, IntraVENous, PRN  heparin flush 100 UNIT/ML injection 100 Units, 1 mL, IntraVENous, 2 times per day  heparin flush 100 UNIT/ML injection 100 Units, 1 mL, IntraCATHeter, PRN  fentaNYL (SUBLIMAZE) injection 25 mcg, 25 mcg, IntraVENous, Q4H PRN  lidocaine 1 % injection 5 mL, 5 mL, IntraDERmal, Once  sodium chloride flush 0.9 % injection 5-40 mL, 5-40 mL, IntraVENous, 2 times per day  sodium chloride flush 0.9 % injection 5-40 mL, 5-40 mL, IntraVENous, PRN  0.9 % sodium chloride infusion, 25 mL, IntraVENous, PRN  heparin flush 100 UNIT/ML injection 100 Units, 1 mL, IntraVENous, 2 times per day  heparin flush 100 UNIT/ML injection 100 Units, 1 mL, IntraCATHeter, PRN  dextrose 5 % and 0.9 % sodium chloride infusion, , IntraVENous, Continuous  loperamide (IMODIUM) capsule 4 mg, 4 mg, Oral, TID PRN  ipratropium-albuterol (DUONEB) nebulizer solution 1 ampule, 1 ampule, Inhalation, Q4H PRN  ARIPiprazole (ABILIFY) tablet 5 mg, 5 mg, Oral, Nightly  [Held by provider] bumetanide (BUMEX) tablet 2 mg, 2 mg, Oral, BID  folic acid (FOLVITE) tablet 1 mg, 1 mg, Oral, Daily  hydrOXYzine (ATARAX) tablet 25 mg, 25 mg, Oral, Daily  insulin glargine (LANTUS) injection vial 10 Units, 10 Units, SubCUTAneous, BID  levothyroxine (SYNTHROID) tablet 75 mcg, 75 mcg, Oral, Daily  metoclopramide (REGLAN) tablet 5 mg, 5 mg, Oral, TID  metoprolol tartrate (LOPRESSOR) tablet 25 mg, 25 mg, Oral, BID  mirtazapine (REMERON) tablet 15 mg, 15 mg, Oral, Nightly  pantoprazole (PROTONIX) tablet 40 mg, 40 mg, Oral, QAM AC  rOPINIRole (REQUIP) tablet 0.25 mg, 0.25 mg, Oral, Nightly  sevelamer (RENVELA) tablet 800 mg, 800 mg, Oral, TID WC  acetaminophen (TYLENOL) tablet 650 mg, 650 mg, Oral, Q6H PRN **OR** acetaminophen (TYLENOL) suppository 650 mg, 650 mg, Rectal, Q6H PRN  glucose (GLUTOSE) 40 % oral gel 15 g, 15 g, Oral, PRN  dextrose 50 % IV solution, 12.5 g, IntraVENous, PRN  glucagon (rDNA) injection 1 mg, 1 mg, IntraMUSCular, PRN  dextrose 5 % solution, 100 mL/hr, IntraVENous, PRN  [Held by provider] insulin lispro (HUMALOG) injection vial 3 Units, 3 Units, SubCUTAneous, TID WC  insulin lispro (HUMALOG) injection vial 0-6 Units, 0-6 Units, SubCUTAneous, TID WC  insulin lispro (HUMALOG) injection vial 0-3 Units, 0-3 Units, SubCUTAneous, Nightly  ondansetron (ZOFRAN-ODT) disintegrating tablet 4 mg, 4 mg, Oral, Q8H PRN **OR** ondansetron (ZOFRAN) injection 4 mg, 4 mg, IntraVENous, Q6H PRN  polyethylene glycol (GLYCOLAX) packet 17 g, 17 g, Oral, Daily PRN  heparin (porcine) injection 5,000 Units, 5,000 Units, SubCUTAneous, BID  piperacillin-tazobactam (ZOSYN) 3,375 mg in dextrose 5 % 50 mL IVPB extended infusion (mini-bag), 3,375 mg, IntraVENous, Q12H  fluconazole (DIFLUCAN) in 0.9 % sodium chloride IVPB 400 mg, 400 mg, IntraVENous, Q24H  0.9 % sodium chloride infusion, , IntraVENous, Q12H  epoetin nena-epbx (RETACRIT) injection 3,000 Units, 3,000 Units, SubCUTAneous, Once per day on Mon Wed Fri **AND** epoetin nena-epbx (RETACRIT) injection 2,000 Units, 2,000 Units, SubCUTAneous, Once per day on Mon Wed Fri  vancomycin (VANCOCIN) intermittent dosing (placeholder), , Other, RX Placeholder  Allergies:  Cefepime and Toradol [ketorolac tromethamine]    Social History:    TOBACCO:   reports that she quit smoking about 8 months ago. Her smoking use included cigarettes. She has a 3.00 pack-year smoking history. She has never used smokeless tobacco.  ETOH:   reports no history of alcohol use.     Family History:       Problem Relation Age of Onset   Nick Asthma Mother     Hypertension Mother     High Blood Pressure Mother     Diabetes Mother     Asthma Brother     High Blood Pressure Father      REVIEW OF SYSTEMS:    CONSTITUTIONAL:  positive for  fatigue and malaise  EYES:  negative  HEENT:  negative for  hearing loss and epistaxis  RESPIRATORY:  positive for dyspnea  CARDIOVASCULAR:  negative for  chest pain, dyspnea  GASTROINTESTINAL:  positive for nausea  GENITOURINARY:  negative  INTEGUMENT/BREAST:  negative  HEMATOLOGIC/LYMPHATIC:  negative  ALLERGIC/IMMUNOLOGIC:  positive for drug reactions  ENDOCRINE:  positive for weight changes    MUSCULOSKELETAL:  negative  NEUROLOGICAL:  negative        PHYSICAL EXAM:      Vitals:    VITALS:  BP (!) 169/93   Pulse 103   Temp 97.9 °F (36.6 °C) (Oral)   Resp 20   Ht 5' 4\" (1.626 m)   Wt 145 lb 8.1 oz (66 kg)   LMP  (LMP Unknown)   SpO2 97%   BMI 24.98 kg/m²   24HR INTAKE/OUTPUT:      Intake/Output Summary (Last 24 hours) at 10/1/2021 1101  Last data filed at 10/1/2021 0846  Gross per 24 hour   Intake 1330 ml   Output 3400 ml   Net -2070 ml       PD Catheter Exam:  PD catheter exit site clean    Constitutional: Awake in no acute distress  HEENT:  Normocephalic, PERRL  Respiratory: Decreased breath sounds on the basis  Cardiovascular/Edema:  RRR, S1/S2  Gastrointestinal:  Soft, PD catheter exit site clean   Neurologic:  Nonfocal, AVELAR  Skin:  Warm, dry, no lesions  Other:  no edema     DATA:    CBC:   Lab Results   Component Value Date    WBC 6.8 10/01/2021    RBC 2.32 10/01/2021    HGB 6.8 10/01/2021    HCT 22.6 10/01/2021    MCV 97.4 10/01/2021    MCH 29.3 10/01/2021    MCHC 30.1 10/01/2021    RDW 14.8 10/01/2021     10/01/2021    MPV 10.4 10/01/2021     CMP:    Lab Results   Component Value Date     10/01/2021    K 4.8 10/01/2021     10/01/2021    CO2 28 10/01/2021    BUN 9 10/01/2021    CREATININE 3.8 10/01/2021    GFRAA 17 10/01/2021    LABGLOM 17 10/01/2021    GLUCOSE 215 10/01/2021    GLUCOSE 130 05/18/2012    PROT 4.9 09/28/2021    LABALBU 2.0 09/28/2021    LABALBU 4.1 05/18/2012    CALCIUM 8.5 10/01/2021    BILITOT <0.2 09/28/2021    ALKPHOS 213 09/28/2021    AST 13 09/28/2021    ALT 11 09/28/2021     Magnesium:    Lab Results   Component Value Date    MG 1.7 09/28/2021     Phosphorus:    Lab Results   Component Value Date    PHOS 5.9 09/14/2021     Radiology Review:         CXR 9/08/2021   No acute cardiopulmonary process.               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 She was minimally responsive and hypoxemic. She has been admitted with a diagnosis of possible sepsis. She was given vancomycin and piperacillin-tazobactam in the ER. Patient reports having intermittent nausea and vomiting but denies any diarrhea, fever or chills. 1. ESRD on home HD dialysis training, to continue HD 3 times a week while in the hospital.  2. Metabolic alkalosis (pH 5.060, bicarb level 34) due to combination of vomiting and dialysis, with respiratory compensation  3. HTN, on metoprolol  4. MBD of CKD, on sevelamer   5. Anemia of CKD, on epoetin alpha    6. Restless leg syndrome, on ropinirole  -------------------------------------------------------------------  5. Altered mental status, resolved, hypoglycemia? 6. Possible sepsis, procalcitonin level 1.33, has received piperacillin-tazobactam and vancomycin  7. History of gastroparesis, on metoclopramide  8. Sepsis /peritonitis    Plan:    · HD today x 2 hours then remove HD access due t line sepsis  · PD cath removal today  · Monitor daily labs  · Albumin 25 g IV every 8 hours x2 days  · Epoetin alpha 5000 units 3 times a week    Thank you Dr. Vasquez Brown for allowing us to participate in the care of Ms.  [address]    Electronically signed by Shy Mckeon MD on 10/1/2021 at 11:01 AM      Discussed with ID and RN and Dr Vasquez Brown

## 2021-10-01 NOTE — PROGRESS NOTES
[address] [place], [place] [address]   Progress Note    Admitting Date and Time: 9/28/2021  9:36 AM  Admit Dx: ESRD (end stage renal disease) ([facility] [N18.6]  Sepsis ([facility] [A41.9]  Chronic renal failure syndrome, stage 4 (severe) (HCC) [N18.4]  Sepsis with acute organ dysfunction, due to unspecified organism, unspecified type, unspecified whether septic shock present ([facility] [A41.9, R65.20]    Subjective:    Pt feels a little better than yesterday, her abdominal pain and loose stools are improving. Still has a little cough but slightly better. Denies any fever/chills today. Per RN: Patient getting midline followed by PD catheter removal    ROS: denies fever, chills, cp, sob, n/v, HA unless stated above.      fentaNYL        lidocaine  5 mL IntraDERmal Once    sodium chloride flush  5-40 mL IntraVENous 2 times per day    heparin flush  1 mL IntraVENous 2 times per day    lidocaine  5 mL IntraDERmal Once    sodium chloride flush  5-40 mL IntraVENous 2 times per day    heparin flush  1 mL IntraVENous 2 times per day    ARIPiprazole  5 mg Oral Nightly    [Held by provider] bumetanide  2 mg Oral BID    folic acid  1 mg Oral Daily    hydrOXYzine  25 mg Oral Daily    insulin glargine  10 Units SubCUTAneous BID    levothyroxine  75 mcg Oral Daily    metoclopramide  5 mg Oral TID    metoprolol tartrate  25 mg Oral BID    mirtazapine  15 mg Oral Nightly    pantoprazole  40 mg Oral QAM AC    rOPINIRole  0.25 mg Oral Nightly    sevelamer  800 mg Oral TID WC    [Held by provider] insulin lispro  3 Units SubCUTAneous TID     insulin lispro  0-6 Units SubCUTAneous TID WC    insulin lispro  0-3 Units SubCUTAneous Nightly    heparin (porcine)  5,000 Units SubCUTAneous BID    piperacillin-tazobactam  3,375 mg IntraVENous Q12H    fluconazole  400 mg IntraVENous Q24H    epoetin nena-epbx  3,000 Units SubCUTAneous Once per day on Mon Wed Fri    And    epoetin nena-epbx  2,000 Units SubCUTAneous Once per day on Mon Wed Fri    vancomycin [facility]) intermittent dosing (placeholder)   Other RX Placeholder     sodium chloride, , PRN  sodium chloride flush, 5-40 mL, PRN  sodium chloride, 25 mL, PRN  heparin flush, 1 mL, PRN  fentanNYL, 25 mcg, Q5 Min PRN  bupivacaine-EPINEPHrine PF, , PRN  fentanNYL, 25 mcg, Q4H PRN  sodium chloride flush, 5-40 mL, PRN  sodium chloride, 25 mL, PRN  heparin flush, 1 mL, PRN  loperamide, 4 mg, TID PRN  ipratropium-albuterol, 1 ampule, Q4H PRN  acetaminophen, 650 mg, Q6H PRN   Or  acetaminophen, 650 mg, Q6H PRN  glucose, 15 g, PRN  dextrose, 12.5 g, PRN  glucagon (rDNA), 1 mg, PRN  dextrose, 100 mL/hr, PRN  ondansetron, 4 mg, Q8H PRN   Or  ondansetron, 4 mg, Q6H PRN  polyethylene glycol, 17 g, Daily PRN         Objective:    /86   Pulse 103   Temp 97 °F (36.1 °C) (Temporal)   Resp 20   Ht 5' 4\" (1.626 m)   Wt 145 lb 8.1 oz (66 kg)   LMP  (LMP Unknown)   SpO2 100%   BMI 24.98 kg/m²      General Appearance: alert and oriented to person, place and time and in no acute distress, appears edematous in face and extremities  Skin: warm and dry, RIJ TDC in place, is slightly tender to palpation and dressing does not appear clean.  PD catheter does not appear infected  Head: normocephalic and atraumatic  Eyes: pupils equal, round, and reactive to light, extraocular eye movements intact, conjunctivae normal  Neck: neck supple and non tender without mass   Pulmonary/Chest: slight rales, poor inspiratory effort, no respiratory distress  Cardiovascular: normal rate, normal S1 and S2 and no carotid bruits  Abdomen: soft, non-tender, non-distended, normal bowel sounds, no masses or organomegaly  Extremities: no cyanosis, no clubbing and no edema  Neurologic: no cranial nerve deficit and speech normal however mumbled       Recent Labs     09/29/21  0549 09/30/21  0934 10/01/21  0441    136 138   K 4.2 4.7 4.8   CL 96* 101 103   CO2 30* 25 28   BUN 15 17 9   CREATININE 5.7* 5.8* 3.8* GLUCOSE 77 162* 215*   CALCIUM 7.6* 7.8* 8.5*       No results for input(s): ALKPHOS, PROT, LABALBU, BILITOT, AST, ALT in the last 72 hours. Recent Labs     09/29/21  0549 09/30/21  0935 10/01/21  0441   WBC 12.4* 9.7 6.8   RBC 2.57* 2.45* 2.32*   HGB 7.8* 7.3* 6.8*   HCT 24.1* 22.9* 22.6*   MCV 93.8 93.5 97.4   MCH 30.4 29.8 29.3   MCHC 32.4 31.9* 30.1*   RDW 14.5 14.6 14.8    254 274   MPV 10.6 10.5 10.4       Respiratory Panel, Molecular, with COVID-19 (Restricted: peds pts or suitable admitted adults) [[id number]] (Abnormal)    Collected: 09/28/21 1415    Updated: 09/28/21 1652    Specimen Source: Nasopharyngeal     Adenovirus by PCR Not Detected    Bordetella parapertussis by PCR Not Detected    Bordetella pertussis by PCR Not Detected    Chlamydophilia pneumoniae by PCR Not Detected    Coronavirus 229E by PCR Not Detected    Coronavirus HKU1 by PCR Not Detected    Coronavirus NL63 by PCR Not Detected    Coronavirus OC43 by PCR Not Detected    SARS-CoV-2, PCR Not Detected    Human Metapneumovirus by PCR Not Detected    Human Rhinovirus/Enterovirus by PCR DETECTEDAbnormal     Influenza A by PCR Not Detected    Influenza B by PCR Not Detected    Mycoplasma pneumoniae by PCR Not Detected    Parainfluenza Virus 1 by PCR Not Detected    Parainfluenza Virus 2 by PCR Not Detected    Parainfluenza Virus 3 by PCR Not Detected    Parainfluenza Virus 4 by PCR Not Detected    Respiratory Syncytial Virus by PCR Not Detected         Radiology:   IR US GUIDED PARACENTESIS   Final Result   Successful paracentesis. US ABDOMEN LIMITED   Final Result   Mild-to-moderate amount of ascites. CT HEAD WO CONTRAST   Final Result   No acute intracranial abnormality. XR CHEST PORTABLE   Final Result   Basilar predominant linear/hazy opacities likely represent a component of   atelectasis. Pulmonary edema or atypical inflammation would be additional   considerations.          IR INTERVENTIONAL RADIOLOGY PROCEDURE REQUEST    (Results Pending)       Assessment:  Active Problems:    Sepsis ([facility]    ESRD (end stage renal disease) ([facility]  Resolved Problems:    * No resolved hospital problems. *      Plan:  1. Sepsis 2/2 bacteremia and/or SBP  -Patient was altered on presentation to ER, tachycardic, febrile, WBC 15  -BCx drawn in ER, CXR revealed bibasilar linear opacities, atelectasis vs pleural effusion vs inflammation.  -Procal elevated at 1.3, strep/legionella negative but patient tested positive for rhinovirus on RFA. WBC 15--> 12.4--> 9.7 --> 9.8 today. Has been afebrile for over 48hrs  -ID on board, currently have patient on vanc, zosyn and diflucan  -Paracentesis done yesterday, revealed >250 neutrophils, so far culture showing no growth  -Initial BCx growing coagulase negative staph, molecular ID showing MRSA. Repeat BCx NGTD  -Having PD and HD catheters removed today     2. AMS (resolved)  -Was altered at home, is now alert  -Likely 2/2 #1  -Will monitor mental status     3. Acute hypoxic respiratory failure  -Patient was reported to be in 80s on initial EMS eval  -Was 90% on RA in ER, placed on 2L O2 via NC and now 100%  -ABG revealed 7.476/46.6/40.3/33.6  -CXR with bibasilar linear opacities, will workup for possible PNA  -Still on 2L NC but saturating 97%, wean O2 as tolerated     4. DM  -History established, home meds lantus 10 units BID, lispro 3 units TIDAC and ISS  -Glucose 228 on admission, AG 6,  BHB 0.6, pH 7.476 - not in DKA or HHS  -Glucose this   -Will continue home meds and monitor FSBG     5. ESRD on HD  -Switched from PD to HD after last admission  -Still has PD catheter in, needs to be removed as likely has SBP and will no longer be using it as is on HD now  -Also needs to have HD cath removed and replaced as is bacteremic  -Consulted nephro for HD, stated she will get HD today and then MWF  -Continue home sevelamer and retacrit per nephro     6.  Hypothyroidism  -Continue home synthroid    7. MDD  -Continue home abilify and remeron     8. Gastroparesis  -Continue home reglan     9. HFpEF  -Continue home bumex and metoprolol tartrate    10. Anemia  -Hgb 6.8 today, will be getting 1 unit PRBC today  -FU post transfusion H&H    NOTE: This report was transcribed using voice recognition software. Every effort was made to ensure accuracy; however, inadvertent computerized transcription errors may be present.      Electronically signed by Thang Maradaiga MD on 10/1/2021 at 1:05 PM

## 2021-10-01 NOTE — PROCEDURES
SL power midline Placement 10/1/2021    Product number: [facility] [place] [id number]   Lot Number: [id number]   Consult: limited access   Ultrasound: yes   Left Brachial vein:                Upper Arm Circumference: 33cm    Size: 4.5fr    Exposed Length: 0    Internal Length: 15cm   Cut: 0   Vein Measurement: 0.48cm    Tolerated well, brisk blood return noted, flushed well and capped.  RN notified    Miquel Cantu RN  10/1/2021  10:43 AM

## 2021-10-01 NOTE — OP NOTE
Operative Note      Patient: Esha Matthews  YOB: 1990  MRN: [id number]    Date of Procedure: 10/1/2021    Pre-Op Diagnosis: SEPSIS    Post-Op Diagnosis: same with pd cath in place       Procedure(s):  PERITONEAL CATHETER REMOVAL    Surgeon(s):  MD Bran Dang MD    Assistant:   First Assistant: Zuhair Gardner    Anesthesia: Monitor Anesthesia Care    Estimated Blood Loss (mL): Minimal    Complications: None    Specimens:   * No specimens in log *    Implants:  * No implants in log *      Drains:   Urethral Catheter (Active)   Urine Color Yellow 10/01/21 0849   Urine Appearance Clear 09/30/21 0909   Output (mL) 100 mL 10/01/21 0656       Findings: as dictated    Detailed Description of Procedure:   Local injected over site of fascial cuff and a 2cm incision was made and the cuff was freed with cautery and removed, the incision was closed with vicyrl and the tip was sent for culture    Electronically signed by Bran Angel MD on 10/1/2021 at 12:52 PM

## 2021-10-01 NOTE — PROGRESS NOTES
[address] [place] [address] [facility]  Progress Note  CC: emotional and upset this morning   Face to face  SUBJECTIVE:  Patient is tolerating medications. No reported adverse drug reactions. ROS: No nausea, vomiting, diarrhea.  ++ crying in bed. Emotional.   Having blood work drawn. Has been afebrile.       Medications:  Scheduled Meds:   lidocaine  5 mL IntraDERmal Once    sodium chloride flush  5-40 mL IntraVENous 2 times per day    heparin flush  1 mL IntraVENous 2 times per day    lidocaine  5 mL IntraDERmal Once    sodium chloride flush  5-40 mL IntraVENous 2 times per day    heparin flush  1 mL IntraVENous 2 times per day    ARIPiprazole  5 mg Oral Nightly    [Held by provider] bumetanide  2 mg Oral BID    folic acid  1 mg Oral Daily    hydrOXYzine  25 mg Oral Daily    insulin glargine  10 Units SubCUTAneous BID    levothyroxine  75 mcg Oral Daily    metoclopramide  5 mg Oral TID    metoprolol tartrate  25 mg Oral BID    mirtazapine  15 mg Oral Nightly    pantoprazole  40 mg Oral QAM AC    rOPINIRole  0.25 mg Oral Nightly    sevelamer  800 mg Oral TID WC    [Held by provider] insulin lispro  3 Units SubCUTAneous TID     insulin lispro  0-6 Units SubCUTAneous TID WC    insulin lispro  0-3 Units SubCUTAneous Nightly    heparin (porcine)  5,000 Units SubCUTAneous BID    piperacillin-tazobactam  3,375 mg IntraVENous Q12H    fluconazole  400 mg IntraVENous Q24H    epoetin nena-epbx  3,000 Units SubCUTAneous Once per day on Mon Wed Fri    And    epoetin nena-epbx  2,000 Units SubCUTAneous Once per day on Mon Wed Fri    vancomycin (VANCOCIN) intermittent dosing (placeholder)   Other RX Placeholder     Continuous Infusions:   sodium chloride      sodium chloride      sodium chloride      dextrose 5 % and 0.9 % NaCl      dextrose      sodium chloride 12.5 mL/hr at 10/01/21 0658     PRN Meds:sodium chloride, sodium chloride flush, sodium chloride, heparin flush, fentanNYL, sodium chloride flush, sodium chloride, heparin flush, loperamide, ipratropium-albuterol, acetaminophen **OR** acetaminophen, glucose, dextrose, glucagon (rDNA), dextrose, ondansetron **OR** ondansetron, polyethylene glycol  OBJECTIVE:  Patient Vitals for the past 24 hrs:   BP Temp Temp src Pulse Resp SpO2 Weight   10/01/21 1127 (!) 161/86 97.9 °F (36.6 °C) Oral 105 18 96 %    10/01/21 0645 (!) 169/93 97.9 °F (36.6 °C) Oral 103 20 97 %    09/30/21 1800 (!) 167/80 98.3 °F (36.8 °C)  110 16 96 %    09/30/21 1640 (!) 168/96 97.6 °F (36.4 °C)  107 16  145 lb 8.1 oz (66 kg)   09/30/21 1600 (!) 168/89   110      09/30/21 1530 (!) 170/100   105      09/30/21 1500 (!) 160/97   106      09/30/21 1430 (!) 141/87   104      09/30/21 1400 (!) 140/84   104      09/30/21 1340 (!) 162/91   114      09/30/21 1325 (!) 161/94 98.8 °F (37.1 °C)  113 18  152 lb 1.9 oz (69 kg)     Constitutional: The patient is awake, alert, and oriented. Sitting up in bed- emotional   Skin: Warm and dry. No rashes were noted. Head: Eyes show round, and reactive pupils. No jaundice. Mouth: Moist mucous membranes, no ulcerations, no thrush. Neck: Supple to movements. No lymphadenopathy. Chest: No use of accessory muscles to breathe. Symmetrical expansion. Auscultation reveals no wheezing, crackles, or rhonchi. Diminished. Cardiovascular: S1 and S2 are rhythmic and regular. No murmurs appreciated. Abdomen: Positive bowel sounds to auscultation. Benign to palpation. PD cath  Extremities: No clubbing, no cyanosis, no edema. Right sub HD line in place.      Laboratory and Tests Review:  Lab Results   Component Value Date    WBC 6.8 10/01/2021    WBC 9.7 09/30/2021    WBC 12.4 (H) 09/29/2021    HGB 6.8 (LL) 10/01/2021    HCT 22.6 (L) 10/01/2021    MCV 97.4 10/01/2021     10/01/2021     Lab Results   Component Value Date    NEUTROABS 4.52 10/01/2021    NEUTROABS 7.50 (H) 09/30/2021 NEUTROABS 10.41 (H) 09/29/2021     Lab Results   Component Value Date    CRP 0.9 (H) 08/16/2021    CRP 12.8 (H) 02/22/2021    CRP 2.5 (H) 10/31/2020     Lab Results   Component Value Date    SEDRATE 31 (H) 08/16/2021    SEDRATE 95 (H) 02/22/2021    SEDRATE 35 (H) 10/31/2020     Lab Results   Component Value Date    ALT 11 09/28/2021    AST 13 09/28/2021    ALKPHOS 213 (H) 09/28/2021    BILITOT <0.2 09/28/2021     Lab Results   Component Value Date     10/01/2021    K 4.8 10/01/2021     10/01/2021    CO2 28 10/01/2021    BUN 9 10/01/2021    CREATININE 3.8 10/01/2021    GFRAA 17 10/01/2021    LABGLOM 17 10/01/2021    GLUCOSE 215 10/01/2021    GLUCOSE 130 05/18/2012    PROT 4.9 09/28/2021    LABALBU 2.0 09/28/2021    LABALBU 4.1 05/18/2012    CALCIUM 8.5 10/01/2021    BILITOT <0.2 09/28/2021    ALKPHOS 213 09/28/2021    AST 13 09/28/2021    ALT 11 09/28/2021     Radiology:      Microbiology:   9/28/2021- blood cultures CONS    ASSESSMENT:  · Bacteremia - CONS  · Rule out CLABSI and peritonitis   · Fevers   · Leukocytosis - improved   · ESRD  · Altered mental status   · S/p paracentesis   · ++ Rhinovirus / enterovirus     PLAN:  · Continue zosyn   · continue vancomycin IV for now  · Pharmacy dosing   · Continue diflucan   · For HD line removal and PD cath removal- spoke with charge nurse- culture tips  · Check cultures  · Monitor labs    HEBER Pearce  11:48 AM  10/1/2021       As above    This is a face to face encounter with [facility] on 10/01/21. I have performed and participated in the history, exam, medical decision making, and  POC with the NURSE PRACTITIONER, HEBER Pearce. Imaging and labs were reviewed. Father present  Pt for surgery removal of hd cath and pd cath  Check cx from cath tip  Cont atbx    [address] was informed of their diagnosis, indications, risks and benefits of treatment.       [address] had the opportunity to ask questions. All questions were answered. Thank you for involving me in the care of [address] [place] [address]. Please do not hesitate to call for any questions or concerns.     Electronically signed by Daljit Gaona MD on 10/1/2021 at 2:24 PM    Phone [phone]  Fax [phone]

## 2021-10-01 NOTE — PROGRESS NOTES
Pharmacy Consultation Note  (Antibiotic Dosing and Monitoring)    Initial consult date: 21  Consulting physician: Dr Zane Infante  Drug(s): Vancomycin IV  Indication: Pneumonia, Positive blood culture    Ht Readings from Last 1 Encounters:   21 5' 4\" (1.626 m)     Wt Readings from Last 1 Encounters:   21 145 lb 8.1 oz (66 kg)     Age/  Gender Act BW IBW DW  Allergy Information   36 y.o.   female 69.1 kg 54.7 kg 60.5 kg  Cefepime and Toradol [ketorolac tromethamine]          Date  WBC HD Drug/Dose Time   Given Level(s)   (Time) Comments     (#1) 15.3 -- Vancomycin 1000 mg IV x 1 1216       (#2) 12.4 HD x 50 minutes No dose -- Random @ 0549 = 18.4 mcg/mL      (#3) 9.7 HD x 180 minutes Vancomycin 1000 mg IV x 1 after dialysis 2130     10/1  (#4) 6.8 -- No dose --       (#5)           (#6)           (#7)           Estimated Creatinine Clearance: 20 mL/min (A) (based on SCr of 3.8 mg/dL (H)). UOP over the past 24 hours:       Intake/Output Summary (Last 24 hours) at 10/1/2021 1354  Last data filed at 10/1/2021 1305  Gross per 24 hour   Intake 1455 ml   Output 3400 ml   Net -1945 ml       Temp max: Temp (24hrs), Av.7 °F (36.5 °C), Min:97 °F (36.1 °C), Max:98.3 °F (36.8 °C)      Antibiotic Regimen:  Antibiotic Dose Date Initiated   Zosyn 3.375 g IV Q12H      Cultures:  available culture and sensitivity results were reviewed in EPIC  Cultures sent and are pending. Culture Date Result    Blood cx #1  MR-CoNS   Blood cx #2  NGTD   Covid-19  Not detected   Respiratory Panel  Rhinovirus   Urine cx  No growth   Body fluid (ascitic)  No organisms seen   Blood cx #3  NGTD   Blood cx #4  NGTD     Assessment:  · Consulted by Dr. Zane Infante to dose/monitor vancomycin  · Goal trough level:  15-20 mcg/mL  · Pt is a 35 y/o F with a Hx of noncompliance to home medications and frequent resultant hospital admissions.  Admitted for decreased responsiveness and hypoxia  · Peritoneal dialysis outpatient. Has Tessio to use for HD inpatient  · 9/29: Random @ 0549 = 18.4 mcg/mL  · 9/30: HD yesterday x only 50 minutes d/t malfunctioning cath.  Planning for HD today then removal of both HD/PD caths  · 10/1: Peritoneal catheter removed    Plan:  · No HD; no dose  · Random level tomorrow morning  · Pharmacist will follow and monitor/adjust dosing as necessary      Thank you for the consult,    Min Oliveira, PharmD 10/1/2021 1:59 PM   [phone]

## 2021-10-02 LAB
ANION GAP SERPL CALCULATED.3IONS-SCNC: 4 MMOL/L (ref 7–16)
BASOPHILS ABSOLUTE: 0.03 E9/L (ref 0–0.2)
BASOPHILS RELATIVE PERCENT: 0.4 % (ref 0–2)
BODY FLUID CULTURE, STERILE: NORMAL
BUN BLDV-MCNC: 15 MG/DL (ref 6–20)
CALCIUM SERPL-MCNC: 7.7 MG/DL (ref 8.6–10.2)
CHLORIDE BLD-SCNC: 103 MMOL/L (ref 98–107)
CO2: 27 MMOL/L (ref 22–29)
CREAT SERPL-MCNC: 4.7 MG/DL (ref 0.5–1)
EOSINOPHILS ABSOLUTE: 0.57 E9/L (ref 0.05–0.5)
EOSINOPHILS RELATIVE PERCENT: 8.5 % (ref 0–6)
GFR AFRICAN AMERICAN: 13
GFR NON-AFRICAN AMERICAN: 13 ML/MIN/1.73
GLUCOSE BLD-MCNC: 374 MG/DL (ref 74–99)
GRAM STAIN RESULT: NORMAL
HCT VFR BLD CALC: 26.5 % (ref 34–48)
HEMOGLOBIN: 8.5 G/DL (ref 11.5–15.5)
IMMATURE GRANULOCYTES #: 0.03 E9/L
IMMATURE GRANULOCYTES %: 0.4 % (ref 0–5)
LYMPHOCYTES ABSOLUTE: 1.38 E9/L (ref 1.5–4)
LYMPHOCYTES RELATIVE PERCENT: 20.5 % (ref 20–42)
MCH RBC QN AUTO: 29.5 PG (ref 26–35)
MCHC RBC AUTO-ENTMCNC: 32.1 % (ref 32–34.5)
MCV RBC AUTO: 92 FL (ref 80–99.9)
METER GLUCOSE: 110 MG/DL (ref 74–99)
METER GLUCOSE: 135 MG/DL (ref 74–99)
METER GLUCOSE: 362 MG/DL (ref 74–99)
METER GLUCOSE: 47 MG/DL (ref 74–99)
METER GLUCOSE: 97 MG/DL (ref 74–99)
MONOCYTES ABSOLUTE: 0.4 E9/L (ref 0.1–0.95)
MONOCYTES RELATIVE PERCENT: 5.9 % (ref 2–12)
NEUTROPHILS ABSOLUTE: 4.32 E9/L (ref 1.8–7.3)
NEUTROPHILS RELATIVE PERCENT: 64.3 % (ref 43–80)
PDW BLD-RTO: 15.6 FL (ref 11.5–15)
PLATELET # BLD: 298 E9/L (ref 130–450)
PMV BLD AUTO: 10 FL (ref 7–12)
POTASSIUM REFLEX MAGNESIUM: 5.1 MMOL/L (ref 3.5–5)
RBC # BLD: 2.88 E12/L (ref 3.5–5.5)
SODIUM BLD-SCNC: 134 MMOL/L (ref 132–146)
VANCOMYCIN RANDOM: 25.4 MCG/ML (ref 5–40)
WBC # BLD: 6.7 E9/L (ref 4.5–11.5)

## 2021-10-02 PROCEDURE — 6370000000 HC RX 637 (ALT 250 FOR IP): Performed by: SURGERY

## 2021-10-02 PROCEDURE — 90935 HEMODIALYSIS ONE EVALUATION: CPT

## 2021-10-02 PROCEDURE — 1200000000 HC SEMI PRIVATE

## 2021-10-02 PROCEDURE — 2700000000 HC OXYGEN THERAPY PER DAY

## 2021-10-02 PROCEDURE — 80202 ASSAY OF VANCOMYCIN: CPT

## 2021-10-02 PROCEDURE — 2580000003 HC RX 258: Performed by: SURGERY

## 2021-10-02 PROCEDURE — 99232 SBSQ HOSP IP/OBS MODERATE 35: CPT | Performed by: INTERNAL MEDICINE

## 2021-10-02 PROCEDURE — 2580000003 HC RX 258: Performed by: STUDENT IN AN ORGANIZED HEALTH CARE EDUCATION/TRAINING PROGRAM

## 2021-10-02 PROCEDURE — 85025 COMPLETE CBC W/AUTO DIFF WBC: CPT

## 2021-10-02 PROCEDURE — 80048 BASIC METABOLIC PNL TOTAL CA: CPT

## 2021-10-02 PROCEDURE — 2500000003 HC RX 250 WO HCPCS: Performed by: SURGERY

## 2021-10-02 PROCEDURE — 6360000002 HC RX W HCPCS: Performed by: SURGERY

## 2021-10-02 PROCEDURE — 82962 GLUCOSE BLOOD TEST: CPT

## 2021-10-02 PROCEDURE — 6360000002 HC RX W HCPCS: Performed by: STUDENT IN AN ORGANIZED HEALTH CARE EDUCATION/TRAINING PROGRAM

## 2021-10-02 PROCEDURE — 36415 COLL VENOUS BLD VENIPUNCTURE: CPT

## 2021-10-02 RX ADMIN — Medication 10 ML: at 15:02

## 2021-10-02 RX ADMIN — PIPERACILLIN AND TAZOBACTAM 3375 MG: 3; .375 INJECTION, POWDER, FOR SOLUTION INTRAVENOUS at 15:00

## 2021-10-02 RX ADMIN — SODIUM CHLORIDE, PRESERVATIVE FREE 100 UNITS: 5 INJECTION INTRAVENOUS at 15:46

## 2021-10-02 RX ADMIN — SODIUM CHLORIDE, PRESERVATIVE FREE 10 ML: 5 INJECTION INTRAVENOUS at 22:27

## 2021-10-02 RX ADMIN — LOPERAMIDE HYDROCHLORIDE 4 MG: 2 CAPSULE ORAL at 15:00

## 2021-10-02 RX ADMIN — SEVELAMER CARBONATE 800 MG: 800 TABLET, FILM COATED ORAL at 12:20

## 2021-10-02 RX ADMIN — HYDROCODONE BITARTRATE AND ACETAMINOPHEN 2 TABLET: 5; 325 TABLET ORAL at 14:49

## 2021-10-02 RX ADMIN — SODIUM CHLORIDE, PRESERVATIVE FREE 10 ML: 5 INJECTION INTRAVENOUS at 21:00

## 2021-10-02 RX ADMIN — SODIUM CHLORIDE: 9 INJECTION, SOLUTION INTRAVENOUS at 05:30

## 2021-10-02 RX ADMIN — INSULIN GLARGINE 10 UNITS: 100 INJECTION, SOLUTION SUBCUTANEOUS at 08:32

## 2021-10-02 RX ADMIN — INSULIN LISPRO 5 UNITS: 100 INJECTION, SOLUTION INTRAVENOUS; SUBCUTANEOUS at 08:29

## 2021-10-02 RX ADMIN — DEXTROSE MONOHYDRATE 12.5 G: 25 INJECTION, SOLUTION INTRAVENOUS at 20:57

## 2021-10-02 RX ADMIN — SODIUM CHLORIDE, PRESERVATIVE FREE 10 ML: 5 INJECTION INTRAVENOUS at 08:24

## 2021-10-02 RX ADMIN — PIPERACILLIN AND TAZOBACTAM 3375 MG: 3; .375 INJECTION, POWDER, FOR SOLUTION INTRAVENOUS at 01:30

## 2021-10-02 RX ADMIN — FLUCONAZOLE 400 MG: 2 INJECTION, SOLUTION INTRAVENOUS at 19:34

## 2021-10-02 RX ADMIN — HYDROCODONE BITARTRATE AND ACETAMINOPHEN 2 TABLET: 5; 325 TABLET ORAL at 06:18

## 2021-10-02 RX ADMIN — ARIPIPRAZOLE 5 MG: 5 TABLET ORAL at 22:26

## 2021-10-02 RX ADMIN — HYDROXYZINE HYDROCHLORIDE 25 MG: 25 TABLET ORAL at 08:28

## 2021-10-02 RX ADMIN — FOLIC ACID 1 MG: 1 TABLET ORAL at 08:24

## 2021-10-02 RX ADMIN — HEPARIN SODIUM 5000 UNITS: 5000 INJECTION INTRAVENOUS; SUBCUTANEOUS at 08:31

## 2021-10-02 RX ADMIN — VANCOMYCIN HYDROCHLORIDE 500 MG: 500 INJECTION, POWDER, LYOPHILIZED, FOR SOLUTION INTRAVENOUS at 19:41

## 2021-10-02 RX ADMIN — SEVELAMER CARBONATE 800 MG: 800 TABLET, FILM COATED ORAL at 08:24

## 2021-10-02 RX ADMIN — LEVOTHYROXINE SODIUM 75 MCG: 0.07 TABLET ORAL at 06:19

## 2021-10-02 RX ADMIN — MIRTAZAPINE 15 MG: 15 TABLET, FILM COATED ORAL at 22:26

## 2021-10-02 RX ADMIN — METOPROLOL TARTRATE 25 MG: 25 TABLET, FILM COATED ORAL at 08:23

## 2021-10-02 RX ADMIN — ROPINIROLE HYDROCHLORIDE 0.25 MG: 0.25 TABLET, FILM COATED ORAL at 22:26

## 2021-10-02 RX ADMIN — SEVELAMER CARBONATE 800 MG: 800 TABLET, FILM COATED ORAL at 17:16

## 2021-10-02 RX ADMIN — Medication 10 ML: at 21:00

## 2021-10-02 RX ADMIN — METOPROLOL TARTRATE 25 MG: 25 TABLET, FILM COATED ORAL at 22:26

## 2021-10-02 RX ADMIN — FENTANYL CITRATE 25 MCG: 0.05 INJECTION, SOLUTION INTRAMUSCULAR; INTRAVENOUS at 08:24

## 2021-10-02 RX ADMIN — SODIUM CHLORIDE, PRESERVATIVE FREE 100 UNITS: 5 INJECTION INTRAVENOUS at 22:27

## 2021-10-02 RX ADMIN — PANTOPRAZOLE SODIUM 40 MG: 40 TABLET, DELAYED RELEASE ORAL at 06:19

## 2021-10-02 RX ADMIN — SODIUM CHLORIDE: 9 INJECTION, SOLUTION INTRAVENOUS at 19:00

## 2021-10-02 ASSESSMENT — PAIN DESCRIPTION - FREQUENCY: FREQUENCY: INTERMITTENT

## 2021-10-02 ASSESSMENT — PAIN DESCRIPTION - DESCRIPTORS: DESCRIPTORS: ACHING;DISCOMFORT

## 2021-10-02 ASSESSMENT — PAIN DESCRIPTION - PAIN TYPE: TYPE: SURGICAL PAIN

## 2021-10-02 ASSESSMENT — PAIN SCALES - GENERAL
PAINLEVEL_OUTOF10: 8

## 2021-10-02 ASSESSMENT — PAIN DESCRIPTION - LOCATION: LOCATION: ABDOMEN

## 2021-10-02 NOTE — PROGRESS NOTES
Department of Internal Medicine  Nephrology Progress Note      Events reviewed. S/p removal of tunneled dialysis catheter and PD catheter with placement of right femoral temporary dialysis catheter.     SUBJECTIVE:  We are followiong Wilton Flores for ESRD on HD.      PHYSICAL EXAM:      Vitals:    VITALS:  BP (!) 168/80   Pulse 104   Temp 98.5 °F (36.9 °C) (Oral)   Resp 20   Ht 5' 4\" (1.626 m)   Wt 145 lb 8.1 oz (66 kg)   LMP  (LMP Unknown)   SpO2 99%   BMI 24.98 kg/m²   24HR INTAKE/OUTPUT:      Intake/Output Summary (Last 24 hours) at 10/2/2021 0803  Last data filed at 10/1/2021 2224  Gross per 24 hour   Intake 898 ml   Output    Net 898 ml       Access: right femoral temporary dialysis catheter   Constitutional: Awake in no acute distress  HEENT:  Normocephalic, PERRL  Respiratory: Decreased breath sounds on the basis  Cardiovascular/Edema:  RRR, S1/S2  Gastrointestinal:  Soft, PD catheter exit site clean   Neurologic:  Nonfocal, AVELAR  Skin:  Warm, dry, no lesions  Other:  no edema     DATA:    CBC:   Lab Results   Component Value Date    WBC 6.7 10/02/2021    RBC 2.88 10/02/2021    HGB 8.5 10/02/2021    HCT 26.5 10/02/2021    MCV 92.0 10/02/2021    MCH 29.5 10/02/2021    MCHC 32.1 10/02/2021    RDW 15.6 10/02/2021     10/02/2021    MPV 10.0 10/02/2021     CMP:    Lab Results   Component Value Date     10/02/2021    K 5.1 10/02/2021     10/02/2021    CO2 27 10/02/2021    BUN 15 10/02/2021    CREATININE 4.7 10/02/2021    GFRAA 13 10/02/2021    LABGLOM 13 10/02/2021    GLUCOSE 374 10/02/2021    GLUCOSE 130 05/18/2012    PROT 4.9 09/28/2021    LABALBU 2.0 09/28/2021    LABALBU 4.1 05/18/2012    CALCIUM 7.7 10/02/2021    BILITOT <0.2 09/28/2021    ALKPHOS 213 09/28/2021    AST 13 09/28/2021    ALT 11 09/28/2021     Magnesium:    Lab Results   Component Value Date    MG 1.7 09/28/2021     Phosphorus:    Lab Results   Component Value Date    PHOS 5.9 09/14/2021     Radiology Review: CXR 9/08/2021   No acute cardiopulmonary process.               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 She was minimally responsive and hypoxemic. She has been admitted with a diagnosis of possible sepsis. She was given vancomycin and piperacillin-tazobactam in the ER. Patient reports having intermittent nausea and vomiting but denies any diarrhea, fever or chills. 1. ESRD on home HD dialysis training, to continue HD 3 times a week while in the hospital. Will need dialysis today. 2. Hyperkalemia 5.1, 2/2 decreased GFR (ESRD) will need dialysis today  3. Metabolic alkalosis (pH 5.358, bicarb level 34) due to combination of vomiting and dialysis, with respiratory compensation  4. HTN, on metoprolol  5. MBD of CKD, on sevelamer   6. Anemia of CKD, on epoetin alpha    7. Restless leg syndrome, on ropinirole  -------------------------------------------------------------------  5. Altered mental status, resolved, hypoglycemia? 6. Possible sepsis, procalcitonin level 1.33, has received piperacillin-tazobactam and vancomycin  7. History of gastroparesis, on metoclopramide  8. Sepsis /peritonitis. S/p removal of tunneled dialysis catheter and PD catheter 10/1.     Plan:    · Discontinue D10, blood glucose >300  · HD today with a 2 K bath, orders reviewed with the dialysis nurse  · Tunneled dialysis catheter removed 10/1 with placement of right femoral temporary line  · PD cath removed 10/1  · Continue to monitor daily labs  · Epoetin alpha 5000 units 3 times a week  · Low potassium diet      Petar Cm MD

## 2021-10-02 NOTE — PROGRESS NOTES
[address] [place], [place] [address]ist   Progress Note    Admitting Date and Time: 9/28/2021  9:36 AM  Admit Dx: ESRD (end stage renal disease) ([facility] [N18.6]  Sepsis ([facility] [A41.9]  Chronic renal failure syndrome, stage 4 (severe) (HCC) [N18.4]  Sepsis with acute organ dysfunction, due to unspecified organism, unspecified type, unspecified whether septic shock present ([facility] [A41.9, R65.20]    Subjective:    No complaints. Tired today  rn says no issues. Glu better today. Frequent stools slowed by immonidum    ROS: denies fever, chills, cp, sob, n/v, HA unless stated above.      vancomycin  500 mg IntraVENous Once    lidocaine  5 mL IntraDERmal Once    sodium chloride flush  5-40 mL IntraVENous 2 times per day    heparin flush  1 mL IntraVENous 2 times per day    lidocaine  5 mL IntraDERmal Once    sodium chloride flush  5-40 mL IntraVENous 2 times per day    heparin flush  1 mL IntraVENous 2 times per day    ARIPiprazole  5 mg Oral Nightly    [Held by provider] bumetanide  2 mg Oral BID    folic acid  1 mg Oral Daily    hydrOXYzine  25 mg Oral Daily    insulin glargine  10 Units SubCUTAneous BID    levothyroxine  75 mcg Oral Daily    metoclopramide  5 mg Oral TID    metoprolol tartrate  25 mg Oral BID    mirtazapine  15 mg Oral Nightly    pantoprazole  40 mg Oral QAM AC    rOPINIRole  0.25 mg Oral Nightly    sevelamer  800 mg Oral TID WC    [Held by provider] insulin lispro  3 Units SubCUTAneous TID     insulin lispro  0-6 Units SubCUTAneous TID     insulin lispro  0-3 Units SubCUTAneous Nightly    heparin (porcine)  5,000 Units SubCUTAneous BID    piperacillin-tazobactam  3,375 mg IntraVENous Q12H    fluconazole  400 mg IntraVENous Q24H    epoetin nena-epbx  3,000 Units SubCUTAneous Once per day on Mon Wed Fri    And    epoetin nena-epbx  2,000 Units SubCUTAneous Once per day on Mon Wed Fri    vancomycin (VANCOCIN) intermittent dosing (placeholder)   Other RX Placeholder ALKPHOS, PROT, LABALBU, BILITOT, AST, ALT in the last 72 hours. Recent Labs     09/30/21  0935 09/30/21  0935 10/01/21  0441 10/01/21  2025 10/02/21  0655   WBC 9.7  --  6.8  --  6.7   RBC 2.45*  --  2.32*  --  2.88*   HGB 7.3*   < > 6.8* 8.4* 8.5*   HCT 22.9*   < > 22.6* 26.4* 26.5*   MCV 93.5  --  97.4  --  92.0   MCH 29.8  --  29.3  --  29.5   MCHC 31.9*  --  30.1*  --  32.1   RDW 14.6  --  14.8  --  15.6*     --  274  --  298   MPV 10.5  --  10.4  --  10.0    < > = values in this interval not displayed. Respiratory Panel, Molecular, with COVID-19 (Restricted: peds pts or suitable admitted adults) [[id number]] (Abnormal)    Collected: 09/28/21 1415    Updated: 09/28/21 1652    Specimen Source: Nasopharyngeal     Adenovirus by PCR Not Detected    Bordetella parapertussis by PCR Not Detected    Bordetella pertussis by PCR Not Detected    Chlamydophilia pneumoniae by PCR Not Detected    Coronavirus 229E by PCR Not Detected    Coronavirus HKU1 by PCR Not Detected    Coronavirus NL63 by PCR Not Detected    Coronavirus OC43 by PCR Not Detected    SARS-CoV-2, PCR Not Detected    Human Metapneumovirus by PCR Not Detected    Human Rhinovirus/Enterovirus by PCR DETECTEDAbnormal     Influenza A by PCR Not Detected    Influenza B by PCR Not Detected    Mycoplasma pneumoniae by PCR Not Detected    Parainfluenza Virus 1 by PCR Not Detected    Parainfluenza Virus 2 by PCR Not Detected    Parainfluenza Virus 3 by PCR Not Detected    Parainfluenza Virus 4 by PCR Not Detected    Respiratory Syncytial Virus by PCR Not Detected         Radiology:   IR FLUORO GUIDED CVA DEVICE PLMT/REPLACE/REMOVAL   Final Result   Successful placement of a temporary dialysis catheter via the right common   femoral vein. .      . IR ULTRASOUND GUIDANCE VASCULAR ACCESS   Final Result   Successful placement of a temporary dialysis catheter via the right common   femoral vein. .      .          IR [id number]   Final Result   Successful paracentesis. US ABDOMEN LIMITED   Final Result   Mild-to-moderate amount of ascites. CT HEAD WO CONTRAST   Final Result   No acute intracranial abnormality. XR CHEST PORTABLE   Final Result   Basilar predominant linear/hazy opacities likely represent a component of   atelectasis. Pulmonary edema or atypical inflammation would be additional   considerations. Assessment:  Principal Problem:    Sepsis ([facility]  Active Problems:    ESRD (end stage renal disease) ([facility]  Resolved Problems:    * No resolved hospital problems. *      Plan:  1. Sepsis 2/2 bacteremia and/or SBP  Sepsis via: confusion, tachycardic, febrile, WBC 15  -BCx drawn in ER, CXR revealed bibasilar linear opacities, atelectasis vs pleural effusion vs inflammation.  -Procal elevated at 1.3, strep/legionella negative but patient tested positive for rhinovirus on RFA. WBC 15--> 12.4--> 9.7 --> 9.8 today. Has been afebrile for over 48hrs  -ID on board, currently have patient on vanc, zosyn and diflucan  -Paracentesis done yesterday, revealed >250 neutrophils, so far culture showing no growth  -Initial BCx growing coagulase negative staph, molecular ID showing MRSA. Repeat BCx NGTD  -Having PD and HD catheters removed today     2. Toxic metabolic encephalopathy (resolved) due to #1     3. Acute hypoxic respiratory failure  3L NC  CXR with bibasilar linear opacities, will workup for possible PNA       4. DM  -History established, home meds lantus 10 units BID, lispro 3 units TIDAC and ISS  Is known to b brittle. Am glucose >350    5. ESRD on HD  -Switched from PD to HD after last admission  PD catheter removed  HD cath removed and replaced as is bacteremic  nephro for HD via femoral hd cath  Continue home sevelamer and retacrit per nephro     6. Hypothyroidism: Continue home synthroid      7. MDD: Continue home abilify and remeron     8.  Gastroparesis: Continue home reglan     9. HFpEF: Continue home

## 2021-10-02 NOTE — PROGRESS NOTES
2005: patient called and stated to this RN that she felt her sugar was low, very lethargic. Blood sugar obtained, resulted <40, D50 given, STAT glucose drawn. 2031: rechecked blood sugar was 73, patient a little more alert/ 2 orange juice given, service called ( see MAR) for orders  2157: rechecked at 83, patient more alert, meds given and she was given a sandwich.  Will continue to monitor

## 2021-10-02 NOTE — PROGRESS NOTES
[facility]     [address] [place] [address], [address] [place] [address]  Phone [phone]   [phone]      Admit Date: 2021  9:36 AM  Pt Name: Chris Morales  MRN: [id number]  : 1990  Reason for Consult:    Chief Complaint   Patient presents with    Altered Mental Status     to er via ems from home unresponsive. last known well was 0100 today per ems     Requesting Physician:  Ilir Chavez DO  PCP: Quang Granado MD  History Obtained From:  patient, chart   ID consulted for ESRD (end stage renal disease) ([address] [N18.6]  Sepsis ([address] [A41.9]  Chronic renal failure syndrome, stage 4 (severe) ([address] [N18.4]  Sepsis with acute organ dysfunction, due to unspecified organism, unspecified type, unspecified whether septic shock present ([address] [A41.9, R65.20]  on hospital day [address] [address] Complaint   Patient presents with    Altered Mental Status     to er via ems from home unresponsive.  last known well was 0100 today per ems     HISTORYOF PRESENT ILLNESS   Chris Morales is a 36 y.o. female who presents with   has a past medical history of Acute congestive heart failure ([address] BC (acute kidney injury) ([address] Cardiac arrest ([address] Cephalgia, Chronic kidney disease, Depression, Diabetes mellitus ([address] Diabetic gastroparesis associated with type 1 diabetes mellitus ([address] Diabetic ketoacidosis ([address] Diabetic ketoacidosis with coma associated with type 1 diabetes mellitus ([address] Diabetic polyneuropathy associated with type 1 diabetes mellitus ([address] Drug use complicating pregnancy in third trimester, Endocarditis, ESRD (end stage renal disease) ([address] H/O cardiovascular stress test, Hemodialysis patient ([address] History of blood transfusion, Hyperlipidemia, Hyperosmolar hyperglycemic state (HHS) ([address] Hypothyroidism, Iron deficiency anemia, MDRO (multiple drug resistant organisms) resistance, MRSA (methicillin resistant Staphylococcus aureus), Non compliance w medication regimen, Other disorders of kidney and ureter, Pregnancy, Previous  delivery affecting pregnancy, antepartum, Previous stillbirth or demise, antepartum, Seizure ([address] Severe pre-eclampsia in third trimester, Shock liver, and Valvular endocarditis. ED TRIAGEVITALS  BP: 112/78, Temp: 98.4 °F (36.9 °C), Pulse: 95, Resp: 18, SpO2: 99 %  Altered Mental Status      Pt was recently d/c on 9/15 after being rx for hyperglycemia   She was transitioned from PD to [facility] AT [facility] cath  Patient had left foot pain and was found to have fractures of 2nd-4th proximal phalanges and was seen by podiatry and fit in a surgical shoe.   Pt comes in now with change of MS, o2 at 80%  Wbc15.3 hgb8.3 cr5.1  ua neg   Admitted with sepsis  received piptazo/vanco   PT WAS SEEN IN ER LETHARGIC BUT ABLE TO RESPOND SOME  HA RTDC DRESSING NOT CLEAN   HAS DORAN YELLOW URINE CLEAR   NO ABD PAIN NP RASH/WOUNDS  NO DIARRHEA    CURRENT VISIT  10/2/2021  Line/pd cath out  Has line lue   No f/c/nv/d  Has appt for line placement Monday if she is not d/c by then she will leave AMA    2021  PT IN BED SHE FEELS THE SAME ON HER SIDE ON O2   MOTHER ON PHONE SPEAKER UPDATED CONCERNED AND POC OF POSS CLABSI/PERITONITIS  MOTHER WILL SPEAK TO RENAL   REVIEW OF SYSTEMS     CONSTITUTIONAL:   No fever, chills, weight loss  ALLERGIES:    No urticaria, hay fever,    EYES:     No blurry vision, loss of vision, eye pain  ENT:      No hearing loss, sore throat  CARDIOVASCULAR:   No chest pain or palpitations  RESPIRATORY:   No cough, sob  ENDOCRINE:    No increase thirst, urination   HEME-LYMPH:   No easy bruising or bleeding  GI:     No nausea, vomiting or diarrhea  :     No urinary complaints  NEURO:    No seizures, stroke, HA  MUSCULOSKELETAL:  No muscle aches or pain, no joint pain  SKIN:     No rash or itch  PSYCH:    No depression or anxiety    Medications Prior to Admission: bumetanide (BUMEX) 2 MG tablet, Take 2 mg by mouth 2 times daily  glucagon 1 MG injection, Inject 1 kit into the muscle as needed (Low Blood Sugar)  pantoprazole (PROTONIX) 40 MG tablet, Take 40 mg by mouth daily as needed (Reflux)  insulin glargine (LANTUS) 100 UNIT/ML injection vial, Inject 10 Units into the skin 2 times daily  gentamicin (GARAMYCIN) 0.1 % cream, Apply topically 3 times daily. vitamin D (ERGOCALCIFEROL) 1.25 MG (10673 UT) CAPS capsule, Take 1 capsule by mouth once a week  sevelamer (RENVELA) 800 MG tablet, Take 1 tablet by mouth 3 times daily (with meals) New lower dose  insulin lispro, 1 Unit Dial, (HUMALOG KWIKPEN) 100 UNIT/ML SOPN, Inject 3 units with meals + sliding scale.  MAX 30U/day  mirtazapine (REMERON) 15 MG tablet, Take 15 mg by mouth nightly  ARIPiprazole (ABILIFY) 5 MG tablet, Take 5 mg by mouth nightly  glucose (GLUTOSE) 40 % GEL, Take 15 g by mouth as needed (Low Blood Sugar)   levothyroxine (SYNTHROID) 75 MCG tablet, TAKE 1 TABLET BY MOUTH IN THE MORNING BEFORE BREAKFAST  rOPINIRole (REQUIP) 0.25 MG tablet, Take 0.25 mg by mouth nightly  metoprolol tartrate (LOPRESSOR) 25 MG tablet, Take 25 mg by mouth 2 times daily  polyethylene glycol (GLYCOLAX) 17 g packet, Take 17 g by mouth daily as needed for Constipation  CURRENT MEDICATIONS     Current Facility-Administered Medications:     vancomycin (VANCOCIN) 500 mg in sodium chloride 0.9 % 100 mL IVPB (mini-bag), 500 mg, IntraVENous, Once, Tk Collazo MD    0.9 % sodium chloride infusion, , IntraVENous, PRN, Bran Angel MD    lidocaine 1 % injection 5 mL, 5 mL, IntraDERmal, Once, Bran Angel MD    sodium chloride flush 0.9 % injection 5-40 mL, 5-40 mL, IntraVENous, 2 times per day, Bran Angel MD, 10 mL at 10/02/21 0824    sodium chloride flush 0.9 % injection 5-40 mL, 5-40 mL, IntraVENous, PRN, Bran Angel MD    0.9 % sodium chloride infusion, 25 mL, IntraVENous, PRN, Bran Angel MD    heparin flush 100 UNIT/ML injection 100 Units, 1 mL, IntraVENous, 2 times per day, Miranda Barboza MD, 100 Units at 10/02/21 1546    heparin flush 100 UNIT/ML injection 100 Units, 1 mL, IntraCATHeter, PRN, Miranda Barboza MD    HYDROcodone-acetaminophen (NORCO) 5-325 MG per tablet 1 tablet, 1 tablet, Oral, Q4H PRN **OR** HYDROcodone-acetaminophen (NORCO) 5-325 MG per tablet 2 tablet, 2 tablet, Oral, Q4H PRN, Miranda Barboza MD, 2 tablet at 10/02/21 1449    fentaNYL (SUBLIMAZE) injection 25 mcg, 25 mcg, IntraVENous, Q4H PRN, Miranda Barboza MD, 25 mcg at 10/02/21 0824    lidocaine 1 % injection 5 mL, 5 mL, IntraDERmal, Once, Miranda Barboza MD    sodium chloride flush 0.9 % injection 5-40 mL, 5-40 mL, IntraVENous, 2 times per day, Miranda Barboza MD, 10 mL at 10/02/21 1502    sodium chloride flush 0.9 % injection 5-40 mL, 5-40 mL, IntraVENous, PRN, Miranda Barboza MD    0.9 % sodium chloride infusion, 25 mL, IntraVENous, PRN, Miranda Barboza MD    heparin flush 100 UNIT/ML injection 100 Units, 1 mL, IntraVENous, 2 times per day, Miranda Barboza MD    heparin flush 100 UNIT/ML injection 100 Units, 1 mL, IntraCATHeter, PRN, Miranda Barboza MD    loperamide (IMODIUM) capsule 4 mg, 4 mg, Oral, TID PRN, Miranda Barboza MD, 4 mg at 10/02/21 1500    ipratropium-albuterol (DUONEB) nebulizer solution 1 ampule, 1 ampule, Inhalation, Q4H PRN, Miranda Barboza MD, 1 ampule at 09/29/21 2348    ARIPiprazole (ABILIFY) tablet 5 mg, 5 mg, Oral, Nightly, Miranda Barboza MD, 5 mg at 10/01/21 2223    [Held by provider] bumetanide (BUMEX) tablet 2 mg, 2 mg, Oral, BID, Bernard Roman MD, 2 mg at 70/86/80 1259    folic acid (FOLVITE) tablet 1 mg, 1 mg, Oral, Daily, Miranda Barboza MD, 1 mg at 10/02/21 1623    hydrOXYzine (ATARAX) tablet 25 mg, 25 mg, Oral, Daily, Miranda Barboza MD, 25 mg at 10/02/21 0828    insulin glargine (LANTUS) injection vial 10 Units, 10 Units, SubCUTAneous, BID, Miranda Barboza MD, 10 Units at 10/02/21 8036    levothyroxine (SYNTHROID) tablet 75 mcg, 75 mcg, Oral, Daily, Velasquez Wright MD, 75 mcg at 10/02/21 3035    metoclopramide (REGLAN) tablet 5 mg, 5 mg, Oral, TID, Velasquez Wright MD, 5 mg at 09/29/21 2114    metoprolol tartrate (LOPRESSOR) tablet 25 mg, 25 mg, Oral, BID, Velasquez Wright MD, 25 mg at 10/02/21 8721    mirtazapine (REMERON) tablet 15 mg, 15 mg, Oral, Nightly, Velasquez Wright MD, 15 mg at 10/01/21 2224    pantoprazole (PROTONIX) tablet 40 mg, 40 mg, Oral, QAM AC, Velasquez Wright MD, 40 mg at 10/02/21 8074    rOPINIRole (REQUIP) tablet 0.25 mg, 0.25 mg, Oral, Nightly, Velasquez Wright MD, 0.25 mg at 10/01/21 2223    sevelamer (RENVELA) tablet 800 mg, 800 mg, Oral, TID , Velasquez Wright MD, 800 mg at 10/02/21 1220    acetaminophen (TYLENOL) tablet 650 mg, 650 mg, Oral, Q6H PRN, 650 mg at 09/29/21 1802 **OR** acetaminophen (TYLENOL) suppository 650 mg, 650 mg, Rectal, Q6H PRN, Velasquez Wright MD    glucose (GLUTOSE) 40 % oral gel 15 g, 15 g, Oral, PRN, Velasquez Wright MD    dextrose 50 % IV solution, 12.5 g, IntraVENous, PRN, Velasquez Wright MD, 12.5 g at 10/01/21 2015    glucagon (rDNA) injection 1 mg, 1 mg, IntraMUSCular, PRN, Velasquez Wright MD    [Held by provider] insulin lispro (HUMALOG) injection vial 3 Units, 3 Units, SubCUTAneous, TID Jim GUPTA MD, 3 Units at 09/29/21 1332    insulin lispro (HUMALOG) injection vial 0-6 Units, 0-6 Units, SubCUTAneous, TID Velasquez GUPTA MD, 5 Units at 10/02/21 0829    insulin lispro (HUMALOG) injection vial 0-3 Units, 0-3 Units, SubCUTAneous, Nightly, Velasquez Wright MD    ondansetron (ZOFRAN-ODT) disintegrating tablet 4 mg, 4 mg, Oral, Q8H PRN **OR** ondansetron (ZOFRAN) injection 4 mg, 4 mg, IntraVENous, Q6H PRN, Velasquez Wright MD    polyethylene glycol [facility]) packet 17 g, 17 g, Oral, Daily PRN, Velasquez Wright MD    heparin (porcine) injection 5,000 Units, 5,000 Units, SubCUTAneous, BID, Velasquez Wright MD, 5,000 Units at 10/02/21 0831    piperacillin-tazobactam (ZOSYN) 3,375 mg in dextrose 5 % 50 mL IVPB extended infusion (mini-bag), 3,375 mg, IntraVENous, Q12H, Marzena Camargo MD, Last Rate: 12.5 mL/hr at 10/02/21 1500, 3,375 mg at 10/02/21 1500    fluconazole (DIFLUCAN) in 0.9 % sodium chloride IVPB 400 mg, 400 mg, IntraVENous, Q24H, Marzena Camargo MD, Last Rate: 100 mL/hr at 10/01/21 1721, 400 mg at 10/01/21 1721    0.9 % sodium chloride infusion, , IntraVENous, Q12H, Marzena Camargo MD, Stopped at 10/02/21 8953    epoetin nena-epbx (RETACRIT) injection 3,000 Units, 3,000 Units, SubCUTAneous, Once per day on Mon Wed Fri, 3,000 Units at 10/01/21 1531 **AND** epoetin nena-epbx (RETACRIT) injection 2,000 Units, 2,000 Units, SubCUTAneous, Once per day on Mon Wed Fri, Marzena Camargo MD, 2,000 Units at 10/01/21 1531    vancomycin (VANCOCIN) intermittent dosing (placeholder), , Other, RX Placeholder, Marzena Camargo MD  ALLERGIES     Cefepime and Toradol [ketorolac tromethamine]  Immunization History   Administered Date(s) Administered    Influenza Virus Vaccine 10/22/2011, 10/23/2012    Influenza, Quadv, 6 mo and older, IM, PF (Flulaval, Fluarix) 12/15/2018    Influenza, Quadv, IM, PF (6 mo and older Fluzone, Flulaval, Fluarix, and 3 yrs and older Afluria) 03/16/2017, 09/29/2017    Tdap (Boostrix, Adacel) 07/19/2016, 08/26/2016, 06/24/2017      Internal Administration   First Dose      Second Dose           Last COVID Lab SARS-CoV-2 (no units)   Date Value   06/22/2020 Not Detected     SARS-CoV-2, PCR (no units)   Date Value   09/28/2021 Not Detected     SARS-CoV-2, NAAT (no units)   Date Value   09/28/2021 Not Detected            PAST MEDICAL HISTORY     Past Medical History:   Diagnosis Date    Acute congestive heart failure ([facility]     BC (acute kidney injury) ([facility] 10/01/2019    Cardiac arrest ([facility] 02/15/2021    Cephalgia 10/09/2019    Chronic kidney disease     Depression     Diabetes mellitus ([facility]     Diabetic gastroparesis associated with type 1 diabetes mellitus ([address] 2018    Diabetic ketoacidosis ([address] 2011    Diabetic ketoacidosis with coma associated with type 1 diabetes mellitus ([address] 2013    Diabetic polyneuropathy associated with type 1 diabetes mellitus ([address] 2020    Drug use complicating pregnancy in third trimester     Endocarditis 10/31/2020    ESRD (end stage renal disease) ([address] 2020    H/O cardiovascular stress test 2021    Lexiscan    Hemodialysis patient [facility])     History of blood transfusion 2019    Hyperlipidemia 10/08/2020    Hyperosmolar hyperglycemic state (HHS) ([address] 2020    Hypothyroidism 10/08/2020    Iron deficiency anemia 10/01/2019    MDRO (multiple drug resistant organisms) resistance     MRSA (methicillin resistant Staphylococcus aureus)     back wound abcess    Non compliance w medication regimen 2016    Other disorders of kidney and ureter     Pregnancy 2016    16 weeks    Previous  delivery affecting pregnancy, antepartum 2017    Previous stillbirth or demise, antepartum 2016    Seizure ([address] 2020    Severe pre-eclampsia in third trimester 2016    Shock liver 02/15/2021    Valvular endocarditis 11/10/2020    This Diagnosis was added to the Problem List based on transcribed orders from Dr. Cherelle Valdivia       Past Surgical History:   Procedure Laterality Date    BACK SURGERY      abscess     SECTION      x2    CHOLECYSTECTOMY, LAPAROSCOPIC N/A 2019    CHOLECYSTECTOMY LAPAROSCOPIC performed by Jessie Catalan MD at [facility] 2018    COLONOSCOPY WITH BIOPSY performed by Niki Chamorro MD at [address] [place] [address] COLONOSCOPY N/A 2018    COLONOSCOPY WITH BIOPSY performed by Haylie Francois MD at [address] [place] [address] Avenue ECHO COMPL [address]  2012    EF 57%    ECHO COMPL W DOP COLOR FLOW  6/10/2013         EMBOLECTOMY N/A 11/6/2020    Shereen Choi, YULISSA -- REQS ROOM 3 performed by Pau Chan MD at [address] [place] [address] Left 2/23/2021    LEFT LEG INCISION AND DRAINAGE, DEBRIDEMENT, WOUND VAC APPLICATION performed by Maykel Griffin DPM at [address] [place] [address] Left 5/18/2021    LEFT LEG DEBRIDEMENT BIOPSY POSS APPLICATION WOUND VAC performed by Maykel Griffin DPM at [place] [address] N/A 6/26/2020    LAPAROSCOPIC INSERTION PERITONEAL DIALYSIS CATHETER performed by Ayaan Hinton MD at [place] [address] ESOPHAGOGASTRODUODENOSCOPY TRANSORAL DIAGNOSTIC N/A 5/30/2018    EGD ESOPHAGOGASTRODUODENOSCOPY performed by Alfred Ding MD at [address] [place] [address] TRANSESOPHAGEAL ECHOCARDIOGRAM N/A 10/19/2020    TRANSESOPHAGEAL ECHOCARDIOGRAM WITH BUBBLE STUDY performed by Sarah Powell MD at [facility] [address]  04/2018    UPPER GASTROINTESTINAL ENDOSCOPY  12/18/2018    EGD BIOPSY performed by Aditya Lassiter MD at [address] [place] [address] 10/11/2019    EGD ESOPHAGOGASTRODUODENOSCOPY performed by Alverto Kunz DO at [address] [place] [address] 7/14/2021    EGD BIOPSY performed by Belgica Sidhu MD at [address] [place] [address] Griffin:    10/02/21 1230 10/02/21 1300 10/02/21 1330 10/02/21 1411   BP: 129/80 115/66 101/70 112/78   Pulse: 107 100 102 95   Resp:    18   Temp:    98.4 °F (36.9 °C)   TempSrc:       SpO2:       Weight:    150 lb 5.7 oz (68.2 kg)   Height:         Physical Exam   Constitutional/General: awakr on her side  Head: NC/AT  Eyes: PERRL, EOMI    Pulmonary: Lungs DEC  to auscultation bilaterally. Not in respiratory distress on o2   Cardiovascular:  Regular rate and rhythm   Abdomen: Soft, + BS. No distension. Extremities: Moves all extremities x 4.    Warm and well perfused EDEMA  Skin: Warm and dry without rash  Neurologic:    No focal mastoid air cells are clear. No acute intracranial abnormality. IR FLUORO GUIDED CVA DEVICE PLMT/REPLACE/REMOVAL    Addendum Date: 9/21/2021    ADDENDUM: No addendum required for billing. It states in the report that a image was stored. Result Date: 9/21/2021  PROCEDURE: ULTRASOUND GUIDED VASCULAR ACCESS. FLUOROSCOPY GUIDED Tunneled dialysis catheter placement 9/15/2021. HISTORY: ORDERING SYSTEM PROVIDED HISTORY: TDC placement TECHNOLOGIST PROVIDED HISTORY: Reason for exam:->TDC placement SEDATION: The administration, documentation and monitoring of IV conscious sedation was under my direct supervision for time frame of 25 minutes. 1 mg of IV Versed and 50 mcg of IV fentanyl was administered. Radiology nursing staff monitored the patient throughout the entire examination. FLUOROSCOPY DOSE AND TYPE OR TIME AND EXPOSURES: Fluoroscopy time equals 0.9 minutes. Total dose equals 12 mGy. TECHNIQUE: Informed consent was obtained after a detailed explanation of the procedure including risks, benefits, and alternatives. Universal protocol was observed. The right arm was prepped and draped in sterile fashion using maximum sterile barrier technique. Local anesthesia was achieved with lidocaine. A micropuncture needle was used to access the right brachial vein using ultrasound guidance. An ultrasound image demonstrating patency of the vein with needle tip located within it. An image was obtained and stored in PACs. A 0.018 guidewire was used to place a peel-a-way sheath and a  PICC was advanced with fluoroscopic guidance with the tip at the cavo-atrial junction. The catheter flushed easily and there was a good blood return. The catheter was secured to the skin. The patient tolerated the procedure well and there were no immediate complications. FINDINGS: The patient's neck was prepped and draped in a sterile fashion using maximum sterile barrier technique.  Ultrasound images demonstrate a patent right internal jugular vein. Following the uneventful administration of lidocaine using ultrasound guidance I introduced a micro puncture needle into the right internal jugular vein. Through the micro needle a microwire was advanced. Over the microwire a micro sheath was placed. Through the micro sheath an Amplatz wire was advanced into the superior vena cava. 2 dilators were used to dilate a tract into the right internal jugular vein. I then anesthetized a tract along the chest wall measuring 10 cm. Using a blunt tunneling device I tunneled the catheter to the dilator within the right internal jugular vein. Over the Amplatz wire pill away sheath was placed. Through the peel-away sheath the dual lumen 15 Telugu 28 cm dialysis catheter was placed. The catheter tip is positioned at the SVC right atrial junction. The patient tolerated the procedure well and there were no complications. The catheter was then secured to the skin with 2 0 silk suture. The incision overlying the right internal jugular vein was also sutured with 2 0 silk suture. Successful placement of a tunneled dialysis catheter via the right internal jugular vein. Administration of conscious sedation. XR CHEST PORTABLE    Result Date: 9/28/2021  EXAMINATION: ONE XRAY VIEW OF THE CHEST 9/28/2021 7:06 am COMPARISON: One-view chest x-ray 09/08/2021 HISTORY: ORDERING SYSTEM PROVIDED HISTORY: ams TECHNOLOGIST PROVIDED HISTORY: Reason for exam:->ams FINDINGS: There is mild enlargement of the cardiac silhouette, which may be exaggerated by AP technique. The mediastinal contours are within normal limits. A large caliber right internal jugular central venous catheter terminates in the region of the right atrium. The lungs are mildly hypoinflated. There are basilar predominant linear/hazy opacities, right greater than left. No significant pleural effusions or pneumothorax. No osseous abnormality is identified.   Surgical clips are present in the right upper quadrant. Basilar predominant linear/hazy opacities likely represent a component of atelectasis. Pulmonary edema or atypical inflammation would be additional considerations. XR CHEST PORTABLE    Result Date: 9/8/2021  EXAMINATION: ONE XRAY VIEW OF THE CHEST 9/8/2021 1:18 am COMPARISON: August 25, 2021. HISTORY: ORDERING SYSTEM PROVIDED HISTORY: SOB, normal lung exam TECHNOLOGIST PROVIDED HISTORY: Reason for exam:->SOB, normal lung exam FINDINGS: Frontal portable view of the chest.  Normal lung volume. No focal airspace disease. Normal pulmonary vasculature. No pleural effusion or pneumothorax. Stable cardiomediastinal silhouette and great vessels. Multiple old bilateral rib fracture deformities. .     No acute cardiopulmonary process. US DUP UPPER EXTREMITIES BILATERAL VENOUS    Result Date: 9/15/2021  EXAMINATION: DUPLEX ULTRASOUND OF THE BILATERAL UPPER EXTREMITIES FOR DVT, 9/15/2021 10:53 am TECHNIQUE: Duplex ultrasound using B-mode/gray scaled imaging and Doppler spectral analysis and color flow was obtained of the deep venous structures of the upper extremity. COMPARISON: None. HISTORY: ORDERING SYSTEM PROVIDED HISTORY: Jugular vein thrombosis TECHNOLOGIST PROVIDED HISTORY: Reason for exam:->Jugular vein thrombosis What reading provider will be dictating this exam?->CRC FINDINGS: There is normal flow and compressibility of the visualized venous structures. There is no evidence of echogenic thrombus. The veins demonstrate good compressibility with normal color flow study and spectral analysis. No evidence of DVT. Bilateral jugular veins are patent. IR ULTRASOUND GUIDANCE VASCULAR ACCESS    Result Date: 9/15/2021  PROCEDURE: ULTRASOUND GUIDED VASCULAR ACCESS. FLUOROSCOPY GUIDED Tunneled dialysis catheter placement 9/15/2021.  HISTORY: ORDERING SYSTEM PROVIDED HISTORY: [facility] TECHNOLOGIST PROVIDED HISTORY: Reason for exam:->TDC SEDATION: The administration, documentation and monitoring of IV conscious sedation was under my direct supervision for time frame of 25 minutes. 1 mg of IV Versed and 50 mcg of IV fentanyl was administered. FLUOROSCOPY DOSE AND TYPE OR TIME AND EXPOSURES: Fluoroscopy time equals 0.9 minutes. Total dose equals 12 mGy TECHNIQUE: The examination was performed on the both fluoroscopy and ultrasound guidance. A fluoroscopic and ultrasound image was obtained for records. Giovani Carbajal FINDINGS: The patient's neck was prepped and draped in a sterile fashion using maximum sterile barrier technique. Ultrasound images demonstrate a patent right internal jugular vein. Following the uneventful administration of lidocaine using ultrasound guidance I introduced a micro puncture needle into the right internal jugular vein. Through the micro needle a microwire was advanced. Over the microwire a micro sheath was placed. Through the micro sheath an Amplatz wire was advanced into the superior vena cava. 2 dilators were used to dilate a tract into the right internal jugular vein. I then anesthetized a tract along the chest wall measuring 10 cm. Using a blunt tunneling device I tunneled the catheter to the dilator within the right internal jugular vein. Over the Amplatz wire pill away sheath was placed. Through the peel-away sheath the dual lumen 15 Faroese 28 cm dialysis catheter was placed. The catheter tip is positioned at the SVC right atrial junction. The patient tolerated the procedure well and there were no complications. The catheter was then secured to the skin with 2 0 silk suture. The incision overlying the right internal jugular vein was also sutured with 2 0 silk suture. Successful placement of a tunneled dialysis catheter via the right internal jugular vein. Administration of conscious sedation.      LABS  Recent Labs     09/30/21  0935 09/30/21  0935 10/01/21  0441 10/01/21  2025 10/02/21  0655   WBC 9.7  --  6.8  --  6.7   HGB 7.3*   < > 6.8* 8.4* 8.5*   HCT 22.9*   < > 22.6* 26.4* 26.5*   MCV 93.5  --  97.4  --  92.0     --  274  --  298    < > = values in this interval not displayed. Recent Labs     09/30/21  0934 09/30/21  0934 10/01/21  0441 10/01/21  0441 10/01/21  2025 10/02/21  0655     --  138  --   --  134   K 4.7   < > 4.8   < >  --  5.1*      < > 103   < >  --  103   CO2 25   < > 28   < >  --  27   BUN 17  --  9  --   --  15   CREATININE 5.8*  --  3.8*  --   --  4.7*   GFRAA 10  --  17  --   --  13   LABGLOM 10  --  17  --   --  13   GLUCOSE 162*   < > 215*  --  90 374*   CALCIUM 7.8*  --  8.5*  --   --  7.7*    < > = values in this interval not displayed. Recent Labs     09/30/21  1459   PROCAL 10.38*     Lab Results   Component Value Date    CRP 0.9 (H) 08/16/2021    CRP 12.8 (H) 02/22/2021    CRP 2.5 (H) 10/31/2020     Lab Results   Component Value Date    SEDRATE 31 (H) 08/16/2021    SEDRATE 95 (H) 02/22/2021    SEDRATE 35 (H) 10/31/2020     No results found for: [id number]  Lab Results   Component Value Date    COVID19 Not Detected 09/28/2021    COVID19 Not Detected 09/28/2021     COVID-19/ISAIAH-COV2 LABS  Recent Labs     09/30/21  1459   PROCAL 10.38*     Lab Results   Component Value Date    CHOL 95 01/14/2020    TRIG 82 01/14/2020    HDL 33 01/14/2020    LDLCALC 46 01/14/2020    LABVLDL 16 01/14/2020         Lab Results   Component Value Date    HEPAIGM Non-Reactive 09/15/2021    HEPBIGM Non-Reactive 09/15/2021    HCVABI Non-Reactive 09/15/2021     Hep C Ab Interp   Date Value Ref Range Status   09/15/2021 Non-Reactive Non-Reactive Final        MICROBIOLOGY:     Cultures :   Lab Results   Component Value Date    BC 24 Hours no growth 09/29/2021    BC  09/28/2021     This organism was isolated in one set. Susceptibility testing is not routinely done as this  organism frequently represents skin contamination. Additional testing can be ordered by calling the  Microbiology Department.       BC 5 Days no growth 06/21/2021    ORG Staphylococcus coagulase-negative 09/28/2021    ORG Serratia marcescens 07/11/2021    ORG Enterococcus faecium 06/29/2021     Lab Results   Component Value Date    BLOODCULT2 24 Hours no growth 09/29/2021    BLOODCULT2 24 Hours no growth 09/28/2021    BLOODCULT2 5 Days no growth 06/21/2021    ORG Staphylococcus coagulase-negative 09/28/2021    ORG Serratia marcescens 07/11/2021    ORG Enterococcus faecium 06/29/2021       WOUND/ABSCESS   Date Value Ref Range Status   05/16/2021   Final    Heavy growth  Refer to previous culture (CXWND: Leg-Left collected on 5-14-21 at 1426)  for susceptibility results     05/14/2021   Final    Heavy growth  Methicillin resistant Staph aureus isolated. Most Methicillin  resistant Staphylococcus are usually resistant to multiple  antibiotics including other B-Lactams, Aminoglycosides,  Macrolides, Clindamycin and Tetracycline. Contact isolation  is indicated. 02/22/2021 Growth not present  Final       Smear, Respiratory   Date Value Ref Range Status   02/18/2021   Final    Group 6: <25 PMN's/LPF and <25 Epithelial cells/LPF  Rare Polymorphonuclear leukocytes  Rare Epithelial cells  Few Gram positive diplococci  Rare Gram negative rods           Component Value Date/Time    AFBCX No growth after 6 weeks of incubation. 05/18/2021 0727    FUNGSM 1+ Yeast 05/18/2021 0727    LABFUNG  05/18/2021 0727     Moderate growth  No further workup  Unable to identify be conventional methods      LABFUNG No growth after 4 weeks of incubation.  11/06/2020 1142     CULTURE, RESPIRATORY   Date Value Ref Range Status   02/18/2021 Oral Pharyngeal Celine reduced (A)  Final   02/18/2021 Rare growth  Final   02/18/2021 Moderate growth  Final     Culture Catheter Tip   Date Value Ref Range Status   10/01/2021 Growth not present, incubation continues  Preliminary     Body Fluid Culture, Sterile   Date Value Ref Range Status   09/29/2021 Growth not present  Final     Culture Surgical   Date Value Ref Range Status 05/18/2021   Final    Heavy growth  Methicillin resistant Staph aureus isolated. Most Methicillin  resistant Staphylococcus are usually resistant to multiple  antibiotics including other B-Lactams, Aminoglycosides,  Macrolides, Clindamycin and Tetracycline. Contact isolation  is indicated. 05/18/2021 Light growth  Final   02/23/2021 Growth not present  Final     Urine Culture, Routine   Date Value Ref Range Status   09/28/2021 Growth not present  Final   07/11/2021 75,000 CFU/ml  Final   06/29/2021   Final    >100,000 CFU/ml  Vancomycin resistant Enterococci isolated       MRSA Culture Only   Date Value Ref Range Status   12/27/2020 Methicillin resistant Staph aureus not isolated  Final   10/13/2020 Methicillin resistant Staph aureus not isolated  Final   10/30/2019 Methicillin resistant Staph aureus not isolated  Final          FINAL IMPRESSION    Patient is a 36 y.o. female who presented with   Chief Complaint   Patient presents with    Altered Mental Status     to er via ems from home unresponsive.  last known well was 0100 today per ems    and admitted for ESRD (end stage renal disease) ([facility] [N18.6]  Sepsis ([facility] [A41.9]  Chronic renal failure syndrome, stage 4 (severe) ([facility] [N18.4]  Sepsis with acute organ dysfunction, due to unspecified organism, unspecified type, unspecified whether septic shock present ([facility] [A41.9, R65.20]     Leukocytosis/fevers ELEVATED PROCAL   eval for sepsis  Still has pd cath eval for peritonitis and clabsi with hd cath and cons bacteremia   RHINOVIRUS  H/o urine 7/11 Serratia, 6/29 vre  H/o mrsa left le now healed  H/o cdiff 10/2020.,/2020    Pd cath gpo   Hd cath pending  vancomycin (VANCOCIN) 500 mg in sodium chloride 0.9 % 100 mL IVPB (mini-bag), Once     vancomycin (VANCOCIN) intermittent dosing (placeholder), RX Placeholder    vanco to be redosed today VR 25  Can d/c if pt wish   But need vanco to be given after next hemodialysis plan for 2 weeks        Imaging and labs were reviewed per medical records and any ID pertinent labs were addressed with the patient. The patient/FAMILY  was educated about the diagnosis, prognosis, indications, risks and benefits of treatment. An opportunity to ask questions was given to the patient/FAMILY and questions were answered. Thank you for involving me in the care of [address] [place] [address]. Please do not hesitate to call for any questions or concerns.          Electronically signed by Carlos Schuler MD on 10/2/2021 at 4:51 PM

## 2021-10-02 NOTE — PROGRESS NOTES
Pharmacy Consultation Note  (Antibiotic Dosing and Monitoring)    Initial consult date: 21  Consulting physician: Dr Chante Gongora  Drug(s): Vancomycin IV  Indication: Pneumonia, Positive blood culture    Ht Readings from Last 1 Encounters:   21 5' 4\" (1.626 m)     Wt Readings from Last 1 Encounters:   10/02/21 150 lb 5.7 oz (68.2 kg)     Age/  Gender Act BW IBW DW  Allergy Information   36 y.o.   female 69.1 kg 54.7 kg 60.5 kg  Cefepime and Toradol [ketorolac tromethamine]          Date  WBC HD Drug/Dose Time   Given Level(s)   (Time) Comments     (#1) 15.3 -- Vancomycin 1000 mg IV x 1 1216       (#2) 12.4 HD x 50 minutes No dose -- Random @ 0549 = 18.4 mcg/mL      (#3) 9.7 HD x 180 minutes Vancomycin 1000 mg IV x 1 after dialysis 2130     10/1  (#4) 6.8 -- No dose --     10/2  (#5) 6.7 HD x 120 minutes Vancomycin 500 mg IV x 1 <1700> Random @ 0655 = 25.4 mcg/mL      (#6)           (#7)           Estimated Creatinine Clearance: 17 mL/min (A) (based on SCr of 4.7 mg/dL (H)). UOP over the past 24 hours:       Intake/Output Summary (Last 24 hours) at 10/2/2021 1545  Last data filed at 10/2/2021 1411  Gross per 24 hour   Intake 343 ml   Output 1300 ml   Net -957 ml       Temp max: Temp (24hrs), Av.4 °F (36.9 °C), Min:97.9 °F (36.6 °C), Max:98.6 °F (37 °C)      Antibiotic Regimen:  Antibiotic Dose Date Initiated   Zosyn 3.375 g IV Q12H      Cultures:  available culture and sensitivity results were reviewed in EPIC  Cultures sent and are pending. Culture Date Result    Blood cx #1  MR-CoNS   Blood cx #2  NGTD   Covid-19  Not detected   Respiratory Panel  Rhinovirus   Urine cx  No growth   Body fluid (ascitic)  No organisms seen   Blood cx #3  NGTD   Blood cx #4  NGTD   Tip cx;  Abdomen 10/1 Mixed GPOs   Tip cx; Femeral 10/1 NGTD     Assessment:  · Consulted by Dr. Chante Gongora to dose/monitor vancomycin  · Goal trough level:  15-20 mcg/mL  · Pt is a 35 y/o F with a Hx of noncompliance to home medications and frequent resultant hospital admissions. Admitted for decreased responsiveness and hypoxia  · Peritoneal dialysis outpatient. Has Tessio to use for HD inpatient  · 9/29: Random @ 0549 = 18.4 mcg/mL  · 9/30: HD yesterday x only 50 minutes d/t malfunctioning cath.  Planning for HD today then removal of both HD/PD caths  · 10/1: Peritoneal catheter removed  · 10/2: Random @ 5419 = 25.4 mcg/mL    Plan:  · S/p HD x 2 hrs; will give vancomycin 500 mg IV x 1  · Levels as needed  · Pharmacist will follow and monitor/adjust dosing as necessary      Thank you for the consult,    Braeden Frances, PharmD 10/2/2021 3:45 PM   [phone]

## 2021-10-02 NOTE — ANESTHESIA POSTPROCEDURE EVALUATION
Department of Anesthesiology  Postprocedure Note    Patient: Ron Tom  MRN: [id number]  YOB: 1990  Date of evaluation: 10/2/2021  Time:  6:56 AM     Procedure Summary     Date: 10/01/21 Room / Location: [address] [place] [address] 03 / [address]    Anesthesia Start: 4520 Anesthesia Stop: [phone]    Procedure: PERITONEAL CATHETER REMOVAL (N/A Abdomen) Diagnosis: (SEPSIS)    Surgeons: Aide Rodríguez MD Responsible Provider: Tatiana Cee DO    Anesthesia Type: MAC ASA Status: 4          Anesthesia Type: MAC    Shilpa Phase I: Shilpa Score: 10    Shilpa Phase II:      Last vitals: Reviewed and per EMR flowsheets.        Anesthesia Post Evaluation    Patient location during evaluation: PACU  Patient participation: complete - patient participated  Level of consciousness: awake and alert  Airway patency: patent  Nausea & Vomiting: no nausea and no vomiting  Complications: no  Cardiovascular status: hemodynamically stable  Respiratory status: acceptable  Hydration status: euvolemic

## 2021-10-02 NOTE — FLOWSHEET NOTE
10/02/21 1411   Vital Signs   /78   Temp 98.4 °F (36.9 °C)   Pulse 95   Resp 18   Weight 150 lb 5.7 oz (68.2 kg)   Weight Method Bed scale   Percent Weight Change -1.44   Post-Hemodialysis Assessment   Post-Treatment Procedures Blood returned;Catheter capped, clamped and heparinized x 2 ports   Machine Disinfection Process Exterior Machine Disinfection   Rinseback Volume (ml) 300 ml   Total Liters Processed (l/min) 33.7 l/min   Dialyzer Clearance Lightly streaked   Duration of Treatment (minutes) 120 minutes   Hemodialysis Output (ml) 1300 ml   NET Removed (ml) 1000 ml   Tolerated Treatment Good   Patient Response to Treatment tx ended.  cath closed per policy and procedure

## 2021-10-02 NOTE — PROGRESS NOTES
General Surgery Progress Note  Nishi Osorio MD, MS    Patient's Name/Date of Birth: Rafa Young / 1990    Date: October 2, 2021     Surgeon: Mikal Carlos MD    Chief Complaint: s/p removal PD cath    Patient Active Problem List   Diagnosis    Non compliance w medication regimen    Tobacco smoking complicating pregnancy    MTHFR mutation    Diabetic ketoacidosis without coma associated with type 1 diabetes mellitus ([address]    Hypertension    Prolonged QT interval    Iron deficiency anemia    Sinus tachycardia    Bladder dysfunction    Hypokalemia    Elevated troponin    Severe protein-calorie malnutrition ([address]    Uncontrolled type 1 diabetes mellitus with hyperglycemia, with long-term current use of insulin ([facility])    End stage renal disease ([address]    Hypothyroidism    Hyperlipidemia    Acute cystitis    Nondisplaced fracture of neck of fifth metacarpal bone, left hand, initial encounter for closed fracture    Chronic right-sided low back pain    Endocarditis    Seizure ([address]    Hyperkalemia    Hypomagnesemia    Hypocalcemia    Intractable nausea and vomiting    Wound of left leg, sequela    Syncope    Type 1 diabetes mellitus without complication ([address]    Hyperosmolality    Major depressive disorder    Lactic acid acidosis    Early satiety    Acute on chronic systolic CHF (congestive heart failure) ([facility])    Atypical chest pain    Chronic renal failure syndrome    Sepsis ([address]    ESRD (end stage renal disease) ([address]       Subjective: doing well    Objective:  /66   Pulse 100   Temp 98.6 °F (37 °C)   Resp 18   Ht 5' 4\" (1.626 m)   Wt 152 lb 8.9 oz (69.2 kg)   LMP  (LMP Unknown)   SpO2 99%   BMI 26.19 kg/m²   Labs:  Recent Labs     09/30/21  0935 09/30/21  0935 10/01/21  0441 10/01/21  2025 10/02/21  0655   WBC 9.7  --  6.8  --  6.7   HGB 7.3*   < > 6.8* 8.4* 8.5*   HCT 22.9*   < > 22.6* 26.4* 26.5*    < > = values in this interval not displayed. Lab Results   Component Value Date    CREATININE 4.7 (H) 10/02/2021    BUN 15 10/02/2021     10/02/2021    K 5.1 (H) 10/02/2021     10/02/2021    CO2 27 10/02/2021     No results for input(s): LIPASE, AMYLASE in the last 72 hours.       General appearance:  NAD  Head: NCAT, PERRLA, EOMI, red conjunctiva  Neck: supple, no masses  Lungs: CTAB, equal chest rise bilateral  Heart: Reg rate  Abdomen: soft, nondistended, tender appropriately, incision C/D/I, Skin; no lesions  Gu: no cva tenderness  Extremities: extremities normal, atraumatic, no cyanosis or edema      Assessment/Plan:  Rosalie Jesus is a 36 y.o. female postop day 1 removal peritoneal dialysis catheter    Diet as tolerated okay for discharge when okay with others  She has outpatient follow-up for hemodialysis fistula evaluation Monday    Physician Signature: Electronically signed by Dr. Gary Moore  [phone] (p)  10/2/2021  1:26 PM

## 2021-10-03 VITALS
OXYGEN SATURATION: 98 % | BODY MASS INDEX: 25.67 KG/M2 | RESPIRATION RATE: 20 BRPM | DIASTOLIC BLOOD PRESSURE: 91 MMHG | WEIGHT: 150.35 LBS | HEIGHT: 64 IN | SYSTOLIC BLOOD PRESSURE: 187 MMHG | HEART RATE: 99 BPM | TEMPERATURE: 98.3 F

## 2021-10-03 LAB
ANION GAP SERPL CALCULATED.3IONS-SCNC: 7 MMOL/L (ref 7–16)
BASOPHILS ABSOLUTE: 0.07 E9/L (ref 0–0.2)
BASOPHILS RELATIVE PERCENT: 0.8 % (ref 0–2)
BUN BLDV-MCNC: 13 MG/DL (ref 6–20)
CALCIUM SERPL-MCNC: 8.1 MG/DL (ref 8.6–10.2)
CHLORIDE BLD-SCNC: 107 MMOL/L (ref 98–107)
CO2: 27 MMOL/L (ref 22–29)
CREAT SERPL-MCNC: 4.5 MG/DL (ref 0.5–1)
CULTURE CATHETER TIP: ABNORMAL
CULTURE, BLOOD 2: NORMAL
EOSINOPHILS ABSOLUTE: 0.79 E9/L (ref 0.05–0.5)
EOSINOPHILS RELATIVE PERCENT: 9.2 % (ref 0–6)
GFR AFRICAN AMERICAN: 14
GFR NON-AFRICAN AMERICAN: 14 ML/MIN/1.73
GLUCOSE BLD-MCNC: 106 MG/DL (ref 74–99)
HCT VFR BLD CALC: 27.1 % (ref 34–48)
HEMOGLOBIN: 8.6 G/DL (ref 11.5–15.5)
IMMATURE GRANULOCYTES #: 0.04 E9/L
IMMATURE GRANULOCYTES %: 0.5 % (ref 0–5)
LYMPHOCYTES ABSOLUTE: 1.77 E9/L (ref 1.5–4)
LYMPHOCYTES RELATIVE PERCENT: 20.6 % (ref 20–42)
MCH RBC QN AUTO: 29.6 PG (ref 26–35)
MCHC RBC AUTO-ENTMCNC: 31.7 % (ref 32–34.5)
MCV RBC AUTO: 93.1 FL (ref 80–99.9)
METER GLUCOSE: 101 MG/DL (ref 74–99)
METER GLUCOSE: 102 MG/DL (ref 74–99)
METER GLUCOSE: 144 MG/DL (ref 74–99)
METER GLUCOSE: 144 MG/DL (ref 74–99)
METER GLUCOSE: 147 MG/DL (ref 74–99)
METER GLUCOSE: 40 MG/DL (ref 74–99)
METER GLUCOSE: 43 MG/DL (ref 74–99)
METER GLUCOSE: 58 MG/DL (ref 74–99)
METER GLUCOSE: 58 MG/DL (ref 74–99)
MONOCYTES ABSOLUTE: 0.62 E9/L (ref 0.1–0.95)
MONOCYTES RELATIVE PERCENT: 7.2 % (ref 2–12)
NEUTROPHILS ABSOLUTE: 5.32 E9/L (ref 1.8–7.3)
NEUTROPHILS RELATIVE PERCENT: 61.7 % (ref 43–80)
ORGANISM: ABNORMAL
PDW BLD-RTO: 15.1 FL (ref 11.5–15)
PLATELET # BLD: 316 E9/L (ref 130–450)
PMV BLD AUTO: 10 FL (ref 7–12)
POTASSIUM REFLEX MAGNESIUM: 4.5 MMOL/L (ref 3.5–5)
PROCALCITONIN: 4.5 NG/ML (ref 0–0.08)
RBC # BLD: 2.91 E12/L (ref 3.5–5.5)
SODIUM BLD-SCNC: 141 MMOL/L (ref 132–146)
WBC # BLD: 8.6 E9/L (ref 4.5–11.5)

## 2021-10-03 PROCEDURE — 6360000002 HC RX W HCPCS: Performed by: SURGERY

## 2021-10-03 PROCEDURE — 6370000000 HC RX 637 (ALT 250 FOR IP): Performed by: SURGERY

## 2021-10-03 PROCEDURE — 99239 HOSP IP/OBS DSCHRG MGMT >30: CPT | Performed by: INTERNAL MEDICINE

## 2021-10-03 PROCEDURE — 2580000003 HC RX 258: Performed by: SURGERY

## 2021-10-03 PROCEDURE — 80074 ACUTE HEPATITIS PANEL: CPT

## 2021-10-03 PROCEDURE — 2700000000 HC OXYGEN THERAPY PER DAY

## 2021-10-03 PROCEDURE — 85025 COMPLETE CBC W/AUTO DIFF WBC: CPT

## 2021-10-03 PROCEDURE — 2500000003 HC RX 250 WO HCPCS: Performed by: SURGERY

## 2021-10-03 PROCEDURE — 36415 COLL VENOUS BLD VENIPUNCTURE: CPT

## 2021-10-03 PROCEDURE — 84145 PROCALCITONIN (PCT): CPT

## 2021-10-03 PROCEDURE — 80048 BASIC METABOLIC PNL TOTAL CA: CPT

## 2021-10-03 PROCEDURE — 82962 GLUCOSE BLOOD TEST: CPT

## 2021-10-03 RX ORDER — HEPARIN SODIUM 5000 [USP'U]/ML
5000 INJECTION, SOLUTION INTRAVENOUS; SUBCUTANEOUS 2 TIMES DAILY
Status: DISCONTINUED | OUTPATIENT
Start: 2021-10-03 | End: 2021-10-03 | Stop reason: SDUPTHER

## 2021-10-03 RX ADMIN — SODIUM CHLORIDE: 9 INJECTION, SOLUTION INTRAVENOUS at 06:00

## 2021-10-03 RX ADMIN — PIPERACILLIN AND TAZOBACTAM 3375 MG: 3; .375 INJECTION, POWDER, FOR SOLUTION INTRAVENOUS at 12:36

## 2021-10-03 RX ADMIN — INSULIN LISPRO 1 UNITS: 100 INJECTION, SOLUTION INTRAVENOUS; SUBCUTANEOUS at 12:40

## 2021-10-03 RX ADMIN — SEVELAMER CARBONATE 800 MG: 800 TABLET, FILM COATED ORAL at 17:40

## 2021-10-03 RX ADMIN — SEVELAMER CARBONATE 800 MG: 800 TABLET, FILM COATED ORAL at 08:41

## 2021-10-03 RX ADMIN — HYDROXYZINE HYDROCHLORIDE 25 MG: 25 TABLET ORAL at 08:41

## 2021-10-03 RX ADMIN — DEXTROSE MONOHYDRATE 12.5 G: 25 INJECTION, SOLUTION INTRAVENOUS at 02:43

## 2021-10-03 RX ADMIN — HYDROCODONE BITARTRATE AND ACETAMINOPHEN 2 TABLET: 5; 325 TABLET ORAL at 12:34

## 2021-10-03 RX ADMIN — SEVELAMER CARBONATE 800 MG: 800 TABLET, FILM COATED ORAL at 12:35

## 2021-10-03 RX ADMIN — HYDROCODONE BITARTRATE AND ACETAMINOPHEN 2 TABLET: 5; 325 TABLET ORAL at 18:36

## 2021-10-03 RX ADMIN — FLUCONAZOLE 400 MG: 2 INJECTION, SOLUTION INTRAVENOUS at 17:31

## 2021-10-03 RX ADMIN — DEXTROSE 15 G: 15 GEL ORAL at 14:59

## 2021-10-03 RX ADMIN — SODIUM CHLORIDE, PRESERVATIVE FREE 100 UNITS: 5 INJECTION INTRAVENOUS at 08:39

## 2021-10-03 RX ADMIN — SODIUM CHLORIDE, PRESERVATIVE FREE 10 ML: 5 INJECTION INTRAVENOUS at 08:37

## 2021-10-03 RX ADMIN — HEPARIN SODIUM 5000 UNITS: 5000 INJECTION INTRAVENOUS; SUBCUTANEOUS at 08:44

## 2021-10-03 RX ADMIN — FOLIC ACID 1 MG: 1 TABLET ORAL at 08:41

## 2021-10-03 RX ADMIN — DEXTROSE MONOHYDRATE 12.5 G: 25 INJECTION, SOLUTION INTRAVENOUS at 15:24

## 2021-10-03 RX ADMIN — FENTANYL CITRATE 25 MCG: 0.05 INJECTION, SOLUTION INTRAMUSCULAR; INTRAVENOUS at 14:36

## 2021-10-03 RX ADMIN — HYDROCODONE BITARTRATE AND ACETAMINOPHEN 2 TABLET: 5; 325 TABLET ORAL at 06:08

## 2021-10-03 RX ADMIN — FENTANYL CITRATE 25 MCG: 0.05 INJECTION, SOLUTION INTRAMUSCULAR; INTRAVENOUS at 08:36

## 2021-10-03 RX ADMIN — INSULIN GLARGINE 10 UNITS: 100 INJECTION, SOLUTION SUBCUTANEOUS at 10:06

## 2021-10-03 RX ADMIN — PANTOPRAZOLE SODIUM 40 MG: 40 TABLET, DELAYED RELEASE ORAL at 06:09

## 2021-10-03 RX ADMIN — DEXTROSE 15 G: 15 GEL ORAL at 02:16

## 2021-10-03 RX ADMIN — PIPERACILLIN AND TAZOBACTAM 3375 MG: 3; .375 INJECTION, POWDER, FOR SOLUTION INTRAVENOUS at 02:00

## 2021-10-03 RX ADMIN — METOPROLOL TARTRATE 25 MG: 25 TABLET, FILM COATED ORAL at 08:43

## 2021-10-03 RX ADMIN — LOPERAMIDE HYDROCHLORIDE 4 MG: 2 CAPSULE ORAL at 14:35

## 2021-10-03 RX ADMIN — Medication 10 ML: at 08:43

## 2021-10-03 RX ADMIN — METOCLOPRAMIDE 5 MG: 10 TABLET ORAL at 08:40

## 2021-10-03 RX ADMIN — DEXTROSE 15 G: 15 GEL ORAL at 14:52

## 2021-10-03 RX ADMIN — LEVOTHYROXINE SODIUM 75 MCG: 0.07 TABLET ORAL at 06:09

## 2021-10-03 RX ADMIN — HYDROCODONE BITARTRATE AND ACETAMINOPHEN 2 TABLET: 5; 325 TABLET ORAL at 00:24

## 2021-10-03 RX ADMIN — SODIUM CHLORIDE: 9 INJECTION, SOLUTION INTRAVENOUS at 18:34

## 2021-10-03 ASSESSMENT — PAIN SCALES - GENERAL
PAINLEVEL_OUTOF10: 8
PAINLEVEL_OUTOF10: 7
PAINLEVEL_OUTOF10: 4
PAINLEVEL_OUTOF10: 7
PAINLEVEL_OUTOF10: 9

## 2021-10-03 ASSESSMENT — PAIN DESCRIPTION - PAIN TYPE
TYPE: SURGICAL PAIN
TYPE: SURGICAL PAIN

## 2021-10-03 ASSESSMENT — PAIN DESCRIPTION - DESCRIPTORS
DESCRIPTORS: DISCOMFORT
DESCRIPTORS: DISCOMFORT;SORE

## 2021-10-03 ASSESSMENT — PAIN DESCRIPTION - LOCATION
LOCATION: ABDOMEN
LOCATION: ABDOMEN

## 2021-10-03 ASSESSMENT — PAIN DESCRIPTION - FREQUENCY
FREQUENCY: INTERMITTENT
FREQUENCY: INTERMITTENT

## 2021-10-03 NOTE — PROGRESS NOTES
[address] [place], [place] [address]ist   Progress Note    Admitting Date and Time: 9/28/2021  9:36 AM  Admit Dx: ESRD (end stage renal disease) ([facility] [N18.6]  Sepsis ([facility] [A41.9]  Chronic renal failure syndrome, stage 4 (severe) ([facility]) [N18.4]  Sepsis with acute organ dysfunction, due to unspecified organism, unspecified type, unspecified whether septic shock present ([facility] [A41.9, R65.20]    Subjective:    No complaints. Focused on her prior outpatient plans that was supposed to take place with dialysis cath placement outpatient tomorrow     rn says no issues. ROS: denies fever, chills, cp, sob, n/v, HA unless stated above.      lidocaine  5 mL IntraDERmal Once    sodium chloride flush  5-40 mL IntraVENous 2 times per day    heparin flush  1 mL IntraVENous 2 times per day    lidocaine  5 mL IntraDERmal Once    sodium chloride flush  5-40 mL IntraVENous 2 times per day    heparin flush  1 mL IntraVENous 2 times per day    ARIPiprazole  5 mg Oral Nightly    [Held by provider] bumetanide  2 mg Oral BID    folic acid  1 mg Oral Daily    hydrOXYzine  25 mg Oral Daily    insulin glargine  10 Units SubCUTAneous BID    levothyroxine  75 mcg Oral Daily    metoclopramide  5 mg Oral TID    metoprolol tartrate  25 mg Oral BID    mirtazapine  15 mg Oral Nightly    pantoprazole  40 mg Oral QAM AC    rOPINIRole  0.25 mg Oral Nightly    sevelamer  800 mg Oral TID WC    [Held by provider] insulin lispro  3 Units SubCUTAneous TID     insulin lispro  0-6 Units SubCUTAneous TID     insulin lispro  0-3 Units SubCUTAneous Nightly    heparin (porcine)  5,000 Units SubCUTAneous BID    piperacillin-tazobactam  3,375 mg IntraVENous Q12H    fluconazole  400 mg IntraVENous Q24H    epoetin nena-epbx  3,000 Units SubCUTAneous Once per day on Mon Wed Fri    And    epoetin nena-epbx  2,000 Units SubCUTAneous Once per day on Mon Wed Fri    vancomycin (VANCOCIN) intermittent dosing (placeholder)   Other RX Placeholder     sodium chloride, , PRN  sodium chloride flush, 5-40 mL, PRN  sodium chloride, 25 mL, PRN  heparin flush, 1 mL, PRN  HYDROcodone 5 mg - acetaminophen, 1 tablet, Q4H PRN   Or  HYDROcodone 5 mg - acetaminophen, 2 tablet, Q4H PRN  fentanNYL, 25 mcg, Q4H PRN  sodium chloride flush, 5-40 mL, PRN  sodium chloride, 25 mL, PRN  heparin flush, 1 mL, PRN  loperamide, 4 mg, TID PRN  ipratropium-albuterol, 1 ampule, Q4H PRN  acetaminophen, 650 mg, Q6H PRN   Or  acetaminophen, 650 mg, Q6H PRN  glucose, 15 g, PRN  dextrose, 12.5 g, PRN  glucagon (rDNA), 1 mg, PRN  ondansetron, 4 mg, Q8H PRN   Or  ondansetron, 4 mg, Q6H PRN  polyethylene glycol, 17 g, Daily PRN         Objective:    BP (!) 187/91   Pulse 99   Temp 98.3 °F (36.8 °C) (Oral)   Resp 20   Ht 5' 4\" (1.626 m)   Wt 150 lb 5.7 oz (68.2 kg)   LMP  (LMP Unknown)   SpO2 98%   BMI 25.81 kg/m²      General Appearance: alert and oriented to person, place and time and in no acute distress, appears edematous  Skin: warm and dry,     Head: normocephalic and atraumatic  Eyes: pupils equal, round, and reactive to light, extraocular eye movements intact, conjunctivae normal  Neck: neck supple and non tender without mass   Pulmonary/Chest: , poor inspiratory effort, no respiratory distress  Cardiovascular: normal rate, normal S1 and S2 and no carotid bruits  Abdomen: soft, non-tender, non-distended, normal bowel sounds, no masses or organomegaly  Extremities: no cyanosis, no clubbing and no edema  Neurologic: no cranial nerve deficit and speech normal however mumbled       Recent Labs     10/01/21  0441 10/01/21  0441 10/01/21  2025 10/02/21  0655 10/03/21  0534     --   --  134 141   K 4.8  --   --  5.1* 4.5     --   --  103 107   CO2 28  --   --  27 27   BUN 9  --   --  15 13   CREATININE 3.8*  --   --  4.7* 4.5*   GLUCOSE 215*   < > 90 374* 106*   CALCIUM 8.5*  --   --  7.7* 8.1*    < > = values in this interval not displayed.        No results for input(s): ALKPHOS, PROT, LABALBU, BILITOT, AST, ALT in the last 72 hours. Recent Labs     10/01/21  0441 10/01/21  0441 10/01/21  2025 10/02/21  0655 10/03/21  0534   WBC 6.8  --   --  6.7 8.6   RBC 2.32*  --   --  2.88* 2.91*   HGB 6.8*   < > 8.4* 8.5* 8.6*   HCT 22.6*   < > 26.4* 26.5* 27.1*   MCV 97.4  --   --  92.0 93.1   MCH 29.3  --   --  29.5 29.6   MCHC 30.1*  --   --  32.1 31.7*   RDW 14.8  --   --  15.6* 15.1*     --   --  298 316   MPV 10.4  --   --  10.0 10.0    < > = values in this interval not displayed. Respiratory Panel, Molecular, with COVID-19 (Restricted: peds pts or suitable admitted adults) [[id number]] (Abnormal)    Collected: 09/28/21 1415    Updated: 09/28/21 1652    Specimen Source: Nasopharyngeal     Adenovirus by PCR Not Detected    Bordetella parapertussis by PCR Not Detected    Bordetella pertussis by PCR Not Detected    Chlamydophilia pneumoniae by PCR Not Detected    Coronavirus 229E by PCR Not Detected    Coronavirus HKU1 by PCR Not Detected    Coronavirus NL63 by PCR Not Detected    Coronavirus OC43 by PCR Not Detected    SARS-CoV-2, PCR Not Detected    Human Metapneumovirus by PCR Not Detected    Human Rhinovirus/Enterovirus by PCR DETECTEDAbnormal     Influenza A by PCR Not Detected    Influenza B by PCR Not Detected    Mycoplasma pneumoniae by PCR Not Detected    Parainfluenza Virus 1 by PCR Not Detected    Parainfluenza Virus 2 by PCR Not Detected    Parainfluenza Virus 3 by PCR Not Detected    Parainfluenza Virus 4 by PCR Not Detected    Respiratory Syncytial Virus by PCR Not Detected         Radiology:   IR FLUORO GUIDED CVA DEVICE PLMT/REPLACE/REMOVAL   Final Result   Successful placement of a temporary dialysis catheter via the right common   femoral vein. .      . IR ULTRASOUND GUIDANCE VASCULAR ACCESS   Final Result   Successful placement of a temporary dialysis catheter via the right common   femoral vein. .      .          IR US GUIDED PARACENTESIS Final Result   Successful paracentesis. US ABDOMEN LIMITED   Final Result   Mild-to-moderate amount of ascites. CT HEAD WO CONTRAST   Final Result   No acute intracranial abnormality. XR CHEST PORTABLE   Final Result   Basilar predominant linear/hazy opacities likely represent a component of   atelectasis. Pulmonary edema or atypical inflammation would be additional   considerations. Assessment:  Principal Problem:    Sepsis ([address]  Active Problems:    ESRD (end stage renal disease) ([facility]  Resolved Problems:    * No resolved hospital problems. *      Plan:  1. Sepsis 2/2 bacteremia and/or SBP  Sepsis (confusion, tachycardic, febrile, WBC 15)  BCx's 9/29 n ER still (-); Repeat BCx NGTD  CXR:  bibasilar linear opacities, atelectasis vs pleural effusion vs inflammation. Procal 1.3; strep/legionella negative;  (+) rhinovirus on RFA. WBC improved. Still afebrile  -ID on board, currently have patient on vanc, zosyn and diflucan  -Paracentesis: >250 neutrophils, so far culture showing no growth  Previous hospitalist mentioned \"initial BCx growing coagulase negative staph\"  I cannot find this but I do see cath tip culture showing mixed gram-positive organisms but did on 10/1. PD and HD catheters removed: 1 culture of tip shows mixed organisms     2. Toxic metabolic encephalopathy (resolved) due to #1     3. Acute hypoxic respiratory failure  3L NC  CXR with bibasilar linear opacities, will workup for possible PNA       4. DM: home meds lantus 10 units BID, lispro 3 units TIDAC and ISS  Is known to b brittle. 10/3 glucose improved    5. ESRD on HD  -Switched from PD to HD after last admission  PD catheter removed  HD cath removed and replaced as is bacteremic  nephro for HD via femoral hd cath  Continue home sevelamer and retacrit per nephro     6. Hypothyroidism: Continue home synthroid      7. Depression: Continue home abilify and remeron     8.  Gastroparesis: Continue home reglan     9. HFpEF: Continue home bumex and metoprolol tartrate    10. AnemiaS/p 1 unit PRBC10/2. -FU post transfusion H&H  11. Full code. 12. DVT prophylaxis at heparin 5000 twice daily. 13.  Disposition back to home setting when ready    NOTE: This report was transcribed using voice recognition software. Every effort was made to ensure accuracy; however, inadvertent computerized transcription errors may be present.      Electronically signed by Trevor Rich MD on 10/3/2021 at 9:14 AM

## 2021-10-03 NOTE — DISCHARGE SUMMARY
Please see my note dated from today. I got a call late this afternoon from Dr. Michael Rowland who had learned that the patient was threatening to go AMA. We both looked into the case and reviewed the last nephrology notes. After I saw the patient infectious disease said that the patient may go home today if wishes with Vanco planned to be given after hemodialysis for the next 2 weeks. Nephrology however had not released her or mentioned their follow-up outpatient plans. I had a team meeting with the patient Dr. Michael Rowland the mother who was on the phone and the nurse. Apparently the mother has confirmed that she has an appointment for catheter placement tomorrow morning at 830 followed by her usual appointment for hemodialysis at 1 PM she is in the process of learning how to do dialysis at home. Mother and patient both agree and explained that whether we release her or whether she has to sign out [address] in either event she is definitely leaving the hospital tonight. I explained our caution of wanting to confirm all this and why we are reaching out again to the nephrology DrRolando To verify. They are both understanding of our safety techniques. They were also understanding the risks of leaving with follow-up plan not being implemented risking further life-threatening infections but yet willing to take the risk since they were very confident that they would not fall through the cracks and be able to follow through with the plan appointments that they mentioned. Therefore I am allowing the discharge to go through she is not leaving [address] they both understand the risks and benefits of staying and we will have to trust them to follow-up as they discussed.   During the course of this dictation Dr. Michael Rowland reached out to Dr. Siobhan Haynes to give him word of this discharge and try to confirm the appointments

## 2021-10-03 NOTE — PROGRESS NOTES
Non compliance w medication regimen, Other disorders of kidney and ureter, Pregnancy, Previous  delivery affecting pregnancy, antepartum, Previous stillbirth or demise, antepartum, Seizure ([address] Severe pre-eclampsia in third trimester, Shock liver, and Valvular endocarditis. ED TRIAGEVITALS  BP: (!) 187/91, Temp: 98.3 °F (36.8 °C), Pulse: 99, Resp: 20, SpO2: 98 %  Altered Mental Status      Pt was recently d/c on 9/15 after being rx for hyperglycemia   She was transitioned from PD to [facility] cath  Patient had left foot pain and was found to have fractures of 2nd-4th proximal phalanges and was seen by podiatry and fit in a surgical shoe.   Pt comes in now with change of MS, o2 at 80%  Wbc15.3 hgb8.3 cr5.1  ua neg   Admitted with sepsis  received piptazo/vanco   PT WAS SEEN IN ER LETHARGIC BUT ABLE TO RESPOND SOME  HA RTDC DRESSING NOT CLEAN   HAS DORAN YELLOW URINE CLEAR   NO ABD PAIN NP RASH/WOUNDS  NO DIARRHEA    CURRENT VISIT  10/3/2021  Pt in bed talking to her mom relayed message to mother atbx will be for at least 2 weeks can d/c from id stand point  Pt is still planning to leave AMA to day     10/2/2021  Line/pd cath out  Has line lue   No f/c/nv/d  Has appt for line placement Monday if she is not d/c by then she will leave AMA    2021  PT IN BED SHE FEELS THE SAME ON HER SIDE ON O2   MOTHER ON PHONE SPEAKER UPDATED CONCERNED AND POC OF POSS CLABSI/PERITONITIS  MOTHER WILL SPEAK TO RENAL   REVIEW OF SYSTEMS     CONSTITUTIONAL:   No fever, chills, weight loss  ALLERGIES:    No urticaria, hay fever,    EYES:     No blurry vision, loss of vision, eye pain  ENT:      No hearing loss, sore throat  CARDIOVASCULAR:   No chest pain or palpitations  RESPIRATORY:   No cough, sob  ENDOCRINE:    No increase thirst, urination   HEME-LYMPH:   No easy bruising or bleeding  GI:     No nausea, vomiting or diarrhea  :     No urinary complaints  NEURO:    No seizures, stroke, HA  MUSCULOSKELETAL:  No muscle 100 UNIT/ML injection 100 Units, 1 mL, IntraVENous, 2 times per day, Jonathon Monroe MD, 100 Units at 10/03/21 0839    heparin flush 100 UNIT/ML injection 100 Units, 1 mL, IntraCATHeter, PRN, Jonathon Monroe MD    HYDROcodone-acetaminophen (NORCO) 5-325 MG per tablet 1 tablet, 1 tablet, Oral, Q4H PRN **OR** HYDROcodone-acetaminophen (NORCO) 5-325 MG per tablet 2 tablet, 2 tablet, Oral, Q4H PRN, Jonathon Monroe MD, 2 tablet at 10/03/21 1234    fentaNYL (SUBLIMAZE) injection 25 mcg, 25 mcg, IntraVENous, Q4H PRN, Jonathon Monroe MD, 25 mcg at 10/03/21 1436    lidocaine 1 % injection 5 mL, 5 mL, IntraDERmal, Once, Jonathon Monroe MD    sodium chloride flush 0.9 % injection 5-40 mL, 5-40 mL, IntraVENous, 2 times per day, Jonathon Monroe MD, 10 mL at 10/03/21 0843    sodium chloride flush 0.9 % injection 5-40 mL, 5-40 mL, IntraVENous, PRN, Jonathon Monroe MD    0.9 % sodium chloride infusion, 25 mL, IntraVENous, PRN, Jonathon Monroe MD    heparin flush 100 UNIT/ML injection 100 Units, 1 mL, IntraVENous, 2 times per day, Jonathon Monreo MD    heparin flush 100 UNIT/ML injection 100 Units, 1 mL, IntraCATHeter, PRN, Jonathon Monroe MD    loperamide (IMODIUM) capsule 4 mg, 4 mg, Oral, TID PRN, Jonathon Monroe MD, 4 mg at 10/03/21 1435    ipratropium-albuterol (DUONEB) nebulizer solution 1 ampule, 1 ampule, Inhalation, Q4H PRN, Jonathon Monroe MD, 1 ampule at 09/29/21 2348    ARIPiprazole (ABILIFY) tablet 5 mg, 5 mg, Oral, Nightly, Jonathon Monroe MD, 5 mg at 10/02/21 2226    [Held by provider] bumetanide (BUMEX) tablet 2 mg, 2 mg, Oral, BID, Sole Daniels MD, 2 mg at 02/24/04 1636    folic acid (FOLVITE) tablet 1 mg, 1 mg, Oral, Daily, Jonathon Monroe MD, 1 mg at 10/03/21 0841    hydrOXYzine (ATARAX) tablet 25 mg, 25 mg, Oral, Daily, Jonathon Monroe MD, 25 mg at 10/03/21 0841    insulin glargine (LANTUS) injection vial 10 Units, 10 Units, SubCUTAneous, BID, Jonathon Monroe MD, 10 Units at 10/03/21 1006    levothyroxine (SYNTHROID) tablet 75 mcg, 75 mcg, Oral, Daily, Miranda Barboza MD, 75 mcg at 10/03/21 0609    metoclopramide (REGLAN) tablet 5 mg, 5 mg, Oral, TID, Miranda Barboza MD, 5 mg at 10/03/21 0840    metoprolol tartrate (LOPRESSOR) tablet 25 mg, 25 mg, Oral, BID, Miranda Barboza MD, 25 mg at 10/03/21 0843    mirtazapine (REMERON) tablet 15 mg, 15 mg, Oral, Nightly, Miranda Barboza MD, 15 mg at 10/02/21 2226    pantoprazole (PROTONIX) tablet 40 mg, 40 mg, Oral, QAM AC, Miranda Barboza MD, 40 mg at 10/03/21 0609    rOPINIRole (REQUIP) tablet 0.25 mg, 0.25 mg, Oral, Nightly, Miranda Barboza MD, 0.25 mg at 10/02/21 2226    sevelamer (RENVELA) tablet 800 mg, 800 mg, Oral, TID WC, Miranda Barboza MD, 800 mg at 10/03/21 1235    acetaminophen (TYLENOL) tablet 650 mg, 650 mg, Oral, Q6H PRN, 650 mg at 09/29/21 1802 **OR** acetaminophen (TYLENOL) suppository 650 mg, 650 mg, Rectal, Q6H PRN, Miranda Barboza MD    glucose (GLUTOSE) 40 % oral gel 15 g, 15 g, Oral, PRN, Miranda Barboza MD, 15 g at 10/03/21 1459    dextrose 50 % IV solution, 12.5 g, IntraVENous, PRN, Miranda Barboza MD, 12.5 g at 10/03/21 0243    glucagon (rDNA) injection 1 mg, 1 mg, IntraMUSCular, PRN, MD Donta Rojas  [Held by provider] insulin lispro (HUMALOG) injection vial 3 Units, 3 Units, SubCUTAneous, TID Bernard GUPTA MD, 3 Units at 09/29/21 1332    insulin lispro (HUMALOG) injection vial 0-6 Units, 0-6 Units, SubCUTAneous, TID Miranda GUPTA MD, 1 Units at 10/03/21 1240    insulin lispro (HUMALOG) injection vial 0-3 Units, 0-3 Units, SubCUTAneous, Nightly, Miranda Barboza MD    ondansetron (ZOFRAN-ODT) disintegrating tablet 4 mg, 4 mg, Oral, Q8H PRN **OR** ondansetron (ZOFRAN) injection 4 mg, 4 mg, IntraVENous, Q6H PRN, Miranda Barboza MD    polyethylene glycol [facility]) packet 17 g, 17 g, Oral, Daily PRN, Miranda Barboza MD    heparin (porcine) injection 5,000 Units, 5,000 Units, SubCUTAneous, BID, Anant Flood MD, 5,000 Units at 10/03/21 0844    piperacillin-tazobactam (ZOSYN) 3,375 mg in dextrose 5 % 50 mL IVPB extended infusion (mini-bag), 3,375 mg, IntraVENous, Q12H, Anant Flood MD, Last Rate: 12.5 mL/hr at 10/03/21 1236, 3,375 mg at 10/03/21 1236    fluconazole (DIFLUCAN) in 0.9 % sodium chloride IVPB 400 mg, 400 mg, IntraVENous, Q24H, Anant Flood MD, Last Rate: 100 mL/hr at 10/02/21 1934, 400 mg at 10/02/21 1934    0.9 % sodium chloride infusion, , IntraVENous, Q12H, Anant Flood MD, Stopped at 10/03/21 0815    epoetin nena-epbx (RETACRIT) injection 3,000 Units, 3,000 Units, SubCUTAneous, Once per day on Mon Wed Fri, 3,000 Units at 10/01/21 1531 **AND** epoetin nena-epbx (RETACRIT) injection 2,000 Units, 2,000 Units, SubCUTAneous, Once per day on Mon Wed Fri, Anant Flood MD, 2,000 Units at 10/01/21 1531    vancomycin (VANCOCIN) intermittent dosing (placeholder), , Other, RX Placeholder, Anant Flood MD  ALLERGIES     Cefepime and Toradol [ketorolac tromethamine]  Immunization History   Administered Date(s) Administered    Influenza Virus Vaccine 10/22/2011, 10/23/2012    Influenza, Quadv, 6 mo and older, IM, PF (Flulaval, Fluarix) 12/15/2018    Influenza, Quadv, IM, PF (6 mo and older Fluzone, Flulaval, Fluarix, and 3 yrs and older Afluria) 03/16/2017, 09/29/2017    Tdap (Boostrix, Adacel) 07/19/2016, 08/26/2016, 06/24/2017      Internal Administration   First Dose      Second Dose           Last COVID Lab SARS-CoV-2 (no units)   Date Value   06/22/2020 Not Detected     SARS-CoV-2, PCR (no units)   Date Value   09/28/2021 Not Detected     SARS-CoV-2, NAAT (no units)   Date Value   09/28/2021 Not Detected            PAST MEDICAL HISTORY     Past Medical History:   Diagnosis Date    Acute congestive [facility] failure ([facility]     BC (acute kidney injury) ([facility] 10/01/2019    Cardiac arrest ([facility] 02/15/2021    Cephalgia 10/09/2019    Chronic kidney disease     Depression     Diabetes mellitus ([address]     Diabetic gastroparesis associated with type 1 diabetes mellitus ([address] 2018    Diabetic ketoacidosis ([address] 2011    Diabetic ketoacidosis with coma associated with type 1 diabetes mellitus ([address] 2013    Diabetic polyneuropathy associated with type 1 diabetes mellitus ([address] 2020    Drug use complicating pregnancy in third trimester     Endocarditis 10/31/2020    ESRD (end stage renal disease) ([address] 2020    H/O cardiovascular stress test 2021    Lexiscan    Hemod[facility])     History of blood transfusion 2019    Hyperlipidemia 10/08/2020    Hyperosmolar hyperglycemic state (HHS) ([address] 2020    Hypothyroidism 10/08/2020    Iron deficiency anemia 10/01/2019    MDRO (multiple drug resistant organisms) resistance     MRSA (methicillin resistant Staphylococcus aureus)     back wound abcess    Non compliance w medication regimen 2016    Other disorders of kidney and ureter     Pregnancy 2016    16 weeks    Previous  delivery affecting pregnancy, antepartum 2017    Previous stillbirth or demise, antepartum 2016    Seizure ([address] 2020    Severe pre-eclampsia in third trimester 2016    Shock liver 02/15/2021    Valvular endocarditis 11/10/2020    This Diagnosis was added to the Problem List based on transcribed orders from Dr. Emile Sheth       Past Surgical History:   Procedure Laterality Date    BACK SURGERY      abscess     SECTION      x2    CHOLECYSTECTOMY, LAPAROSCOPIC N/A 2019    CHOLECYSTECTOMY LAPAROSCOPIC performed by Tc Warner MD at [address] [facility] N/A 2018    COLONOSCOPY WITH BIOPSY performed by Jordon Osborn MD at [address] [facility] COLONOSCOPY N/A 2018    COLONOSCOPY WITH BIOPSY performed by Karla Cherry MD at [address]  2012    EF 57%    ECHO COMPL W DOP COLOR FLOW  6/10/2013         EMBOLECTOMY N/A 11/6/2020    [address], VEGECTOMY, YULISSA -- REQS ROOM 3 performed by Marisol Mckeon MD at [facility] [address] Left 2/23/2021    LEFT LEG INCISION AND DRAINAGE, DEBRIDEMENT, WOUND VAC APPLICATION performed by Major Carrel, DPM at [address] Left 5/18/2021    LEFT LEG DEBRIDEMENT BIOPSY POSS APPLICATION WOUND VAC performed by Major Carrel, DPM at [place] [address] N/A 6/26/2020    LAPAROSCOPIC INSERTION PERITONEAL DIALYSIS CATHETER performed by Vivienne Morales MD at [place] [address] ESOPHAGOGASTRODUODENOSCOPY TRANSORAL DIAGNOSTIC N/A 5/30/2018    EGD ESOPHAGOGASTRODUODENOSCOPY performed by Karina Longoria MD at [id number] [place] [address] TRANSESOPHAGEAL ECHOCARDIOGRAM N/A 10/19/2020    TRANSESOPHAGEAL ECHOCARDIOGRAM WITH BUBBLE STUDY performed by Yuan Zavala MD at [address] [facility] [address] [address]  04/2018    UPPER GASTROINTESTINAL ENDOSCOPY  12/18/2018    EGD BIOPSY performed by Brooklyn Baez MD at [address] [facility] [address] 10/11/2019    EGD ESOPHAGOGASTRODUODENOSCOPY performed by Rosi Aguillon DO at [address] [facility] [address] 7/14/2021    EGD BIOPSY performed by Charissa Combs MD at [address] [facility]:    10/02/21 1330 10/02/21 1411 10/02/21 1815 10/03/21 0600   BP: 101/70 112/78 (!) 146/89 (!) 187/91   Pulse: 102 95 91 99   Resp:  18 18 20   Temp:  98.4 °F (36.9 °C) 97.9 °F (36.6 °C) 98.3 °F (36.8 °C)   TempSrc:   Oral Oral   SpO2:   99% 98%   Weight:  150 lb 5.7 oz (68.2 kg)     Height:         Physical Exam   Constitutional/General: awake on her side  Head: NC/AT  Eyes: PERRL, EOMI    Pulmonary: Lungs DEC  to auscultation bilaterally.  Not in respiratory distress on o2   Cardiovascular:  Regular rate and rhythm   Abdomen: Soft, + BS.  nt     Extremities: Moves all extremities x 4.   Warm and well perfused EDEMA  Skin: Warm and dry without rash  Neurologic:    No focal deficits   left picc      DIAGNOSTIC RESULTS   RADIOLOGY:   XR FOOT LEFT (MIN 3 VIEWS)    Result Date: 9/10/2021  EXAMINATION: THREE XRAY VIEWS OF THE LEFT FOOT 9/10/2021 4:26 pm COMPARISON: 03/23/2020 HISTORY: ORDERING SYSTEM PROVIDED HISTORY: Attention to base of 2nd and 3rd toes. TECHNOLOGIST PROVIDED HISTORY: Reason for exam:->Attention to base of 2nd and 3rd toes. FINDINGS: Fractures involving the proximal portions of the proximal phalanx 2nd through 5th digits of unknown acuity. There is normal alignment of the tarsometatarsal joints. No acute joint abnormality. No focal osseous lesion. No focal soft tissue abnormality. Fractures proximal portions of the proximal phalanx of the 2nd through 5th digits of unknown acuity. CT HEAD WO CONTRAST    Result Date: 9/28/2021  EXAMINATION: CT OF THE HEAD WITHOUT CONTRAST  9/28/2021 11:05 am TECHNIQUE: CT of the head was performed without the administration of intravenous contrast. Dose modulation, iterative reconstruction, and/or weight based adjustment of the mA/kV was utilized to reduce the radiation dose to as low as reasonably achievable. COMPARISON: CT head 02/15/2021 HISTORY: ORDERING SYSTEM PROVIDED HISTORY: ams TECHNOLOGIST PROVIDED HISTORY: Has a \"code stroke\" or \"stroke alert\" been called? ->No Reason for exam:->ams Decision Support Exception - unselect if not a suspected or confirmed emergency medical condition->Emergency Medical Condition (MA) FINDINGS: There are no areas of abnormal attenuation within the brain parenchyma. No evidence of mass, mass effect, or midline shift. The ventricles and sulci are of normal size and configuration. No extra-axial fluid collections or acute hemorrhage. The gray-white differentiation appears preserved without evidence of acute cortical ischemia. The calvarium is intact.   There is minimal mucosal thickening within the visualized portions of the maxillary sinuses. The remaining visualized paranasal sinuses and mastoid air cells are clear. No acute intracranial abnormality. IR FLUORO GUIDED CVA DEVICE PLMT/REPLACE/REMOVAL    Addendum Date: 9/21/2021    ADDENDUM: No addendum required for billing. It states in the report that a image was stored. Result Date: 9/21/2021  PROCEDURE: ULTRASOUND GUIDED VASCULAR ACCESS. FLUOROSCOPY GUIDED Tunneled dialysis catheter placement 9/15/2021. HISTORY: ORDERING SYSTEM PROVIDED HISTORY: TDC placement TECHNOLOGIST PROVIDED HISTORY: Reason for exam:->TDC placement SEDATION: The administration, documentation and monitoring of IV conscious sedation was under my direct supervision for time frame of 25 minutes. 1 mg of IV Versed and 50 mcg of IV fentanyl was administered. Radiology nursing staff monitored the patient throughout the entire examination. FLUOROSCOPY DOSE AND TYPE OR TIME AND EXPOSURES: Fluoroscopy time equals 0.9 minutes. Total dose equals 12 mGy. TECHNIQUE: Informed consent was obtained after a detailed explanation of the procedure including risks, benefits, and alternatives. Universal protocol was observed. The right arm was prepped and draped in sterile fashion using maximum sterile barrier technique. Local anesthesia was achieved with lidocaine. A micropuncture needle was used to access the right brachial vein using ultrasound guidance. An ultrasound image demonstrating patency of the vein with needle tip located within it. An image was obtained and stored in PACs. A 0.018 guidewire was used to place a peel-a-way sheath and a  PICC was advanced with fluoroscopic guidance with the tip at the cavo-atrial junction. The catheter flushed easily and there was a good blood return. The catheter was secured to the skin. The patient tolerated the procedure well and there were no immediate complications.  FINDINGS: The patient's neck was prepped and draped in a sterile fashion using maximum sterile barrier technique. Ultrasound images demonstrate a patent right internal jugular vein. Following the uneventful administration of lidocaine using ultrasound guidance I introduced a micro puncture needle into the right internal jugular vein. Through the micro needle a microwire was advanced. Over the microwire a micro sheath was placed. Through the micro sheath an Amplatz wire was advanced into the superior vena cava. 2 dilators were used to dilate a tract into the right internal jugular vein. I then anesthetized a tract along the chest wall measuring 10 cm. Using a blunt tunneling device I tunneled the catheter to the dilator within the right internal jugular vein. Over the Amplatz wire pill away sheath was placed. Through the peel-away sheath the dual lumen 15 [place] 28 cm dialysis catheter was placed. The catheter tip is positioned at the SVC right atrial junction. The patient tolerated the procedure well and there were no complications. The catheter was then secured to the skin with 2 0 silk suture. The incision overlying the right internal jugular vein was also sutured with 2 0 silk suture. Successful placement of a tunneled dialysis catheter via the right internal jugular vein. Administration of conscious sedation. XR CHEST PORTABLE    Result Date: 9/28/2021  EXAMINATION: ONE XRAY VIEW OF THE CHEST 9/28/2021 7:06 am COMPARISON: One-view chest x-ray 09/08/2021 HISTORY: ORDERING SYSTEM PROVIDED HISTORY: ams TECHNOLOGIST PROVIDED HISTORY: Reason for exam:->ams FINDINGS: There is mild enlargement of the cardiac silhouette, which may be exaggerated by AP technique. The mediastinal contours are within normal limits. A large caliber right internal jugular central venous catheter terminates in the region of the right atrium. The lungs are mildly hypoinflated. There are basilar predominant linear/hazy opacities, right greater than left.   No significant pleural effusions or pneumothorax. No osseous abnormality is identified. Surgical clips are present in the right upper quadrant. Basilar predominant linear/hazy opacities likely represent a component of atelectasis. Pulmonary edema or atypical inflammation would be additional considerations. XR CHEST PORTABLE    Result Date: 9/8/2021  EXAMINATION: ONE XRAY VIEW OF THE CHEST 9/8/2021 1:18 am COMPARISON: August 25, 2021. HISTORY: ORDERING SYSTEM PROVIDED HISTORY: SOB, normal lung exam TECHNOLOGIST PROVIDED HISTORY: Reason for exam:->SOB, normal lung exam FINDINGS: Frontal portable view of the chest.  Normal lung volume. No focal airspace disease. Normal pulmonary vasculature. No pleural effusion or pneumothorax. Stable cardiomediastinal silhouette and great vessels. Multiple old bilateral rib fracture deformities. .     No acute cardiopulmonary process. US DUP UPPER EXTREMITIES BILATERAL VENOUS    Result Date: 9/15/2021  EXAMINATION: DUPLEX ULTRASOUND OF THE BILATERAL UPPER EXTREMITIES FOR DVT, 9/15/2021 10:53 am TECHNIQUE: Duplex ultrasound using B-mode/gray scaled imaging and Doppler spectral analysis and color flow was obtained of the deep venous structures of the upper extremity. COMPARISON: None. HISTORY: ORDERING SYSTEM PROVIDED HISTORY: Jugular vein thrombosis TECHNOLOGIST PROVIDED HISTORY: Reason for exam:->Jugular vein thrombosis What reading provider will be dictating this exam?->CRC FINDINGS: There is normal flow and compressibility of the visualized venous structures. There is no evidence of echogenic thrombus. The veins demonstrate good compressibility with normal color flow study and spectral analysis. No evidence of DVT. Bilateral jugular veins are patent. IR ULTRASOUND GUIDANCE VASCULAR ACCESS    Result Date: 9/15/2021  PROCEDURE: ULTRASOUND GUIDED VASCULAR ACCESS. FLUOROSCOPY GUIDED Tunneled dialysis catheter placement 9/15/2021.  HISTORY: ORDERING SYSTEM PROVIDED HISTORY: [facility] TECHNOLOGIST PROVIDED HISTORY: Reason for exam:->TDC SEDATION: The administration, documentation and monitoring of IV conscious sedation was under my direct supervision for time frame of 25 minutes. 1 mg of IV Versed and 50 mcg of IV fentanyl was administered. FLUOROSCOPY DOSE AND TYPE OR TIME AND EXPOSURES: Fluoroscopy time equals 0.9 minutes. Total dose equals 12 mGy TECHNIQUE: The examination was performed on the both fluoroscopy and ultrasound guidance. A fluoroscopic and ultrasound image was obtained for records. Mar [place] FINDINGS: The patient's neck was prepped and draped in a sterile fashion using maximum sterile barrier technique. Ultrasound images demonstrate a patent right internal jugular vein. Following the uneventful administration of lidocaine using ultrasound guidance I introduced a micro puncture needle into the right internal jugular vein. Through the micro needle a microwire was advanced. Over the microwire a micro sheath was placed. Through the micro sheath an Amplatz wire was advanced into the superior vena cava. 2 dilators were used to dilate a tract into the right internal jugular vein. I then anesthetized a tract along the chest wall measuring 10 cm. Using a blunt tunneling device I tunneled the catheter to the dilator within the right internal jugular vein. Over the Amplatz wire pill away sheath was placed. Through the peel-away sheath the dual lumen 15 [place] 28 cm dialysis catheter was placed. The catheter tip is positioned at the SVC right atrial junction. The patient tolerated the procedure well and there were no complications. The catheter was then secured to the skin with 2 0 silk suture. The incision overlying the right internal jugular vein was also sutured with 2 0 silk suture. Successful placement of a tunneled dialysis catheter via the right internal jugular vein. Administration of conscious sedation.      LABS  Recent Labs     10/01/21  0441 10/01/21  0441 10/01/21  2025 10/02/21  1082 10/03/21  0534   WBC 6.8  --   --  6.7 8.6   HGB 6.8*   < > 8.4* 8.5* 8.6*   HCT 22.6*   < > 26.4* 26.5* 27.1*   MCV 97.4  --   --  92.0 93.1     --   --  298 316    < > = values in this interval not displayed. Recent Labs     10/01/21  0441 10/01/21  0441 10/01/21  2025 10/02/21  0655 10/02/21  0655 10/03/21  0534     --   --  134  --  141   K 4.8   < >  --  5.1*   < > 4.5      < >  --  103   < > 107   CO2 28   < >  --  27   < > 27   BUN 9  --   --  15  --  13   CREATININE 3.8*  --   --  4.7*  --  4.5*   GFRAA 17  --   --  13  --  14   LABGLOM 17  --   --  13  --  14   GLUCOSE 215*   < > 90 374*  --  106*   CALCIUM 8.5*  --   --  7.7*  --  8.1*    < > = values in this interval not displayed. Recent Labs     10/03/21  0534   PROCAL 4.50*     Lab Results   Component Value Date    CRP 0.9 (H) 08/16/2021    CRP 12.8 (H) 02/22/2021    CRP 2.5 (H) 10/31/2020     Lab Results   Component Value Date    SEDRATE 31 (H) 08/16/2021    SEDRATE 95 (H) 02/22/2021    SEDRATE 35 (H) 10/31/2020     No results found for: [id number]  Lab Results   Component Value Date    COVID19 Not Detected 09/28/2021    COVID19 Not Detected 09/28/2021     COVID-19/ISAIAH-COV2 LABS  Recent Labs     10/03/21  0534   PROCAL 4.50*     Lab Results   Component Value Date    CHOL 95 01/14/2020    TRIG 82 01/14/2020    HDL 33 01/14/2020    LDLCALC 46 01/14/2020    LABVLDL 16 01/14/2020         Lab Results   Component Value Date    HEPAIGM Non-Reactive 09/15/2021    HEPBIGM Non-Reactive 09/15/2021    HCVABI Non-Reactive 09/15/2021     Hep C Ab Interp   Date Value Ref Range Status   09/15/2021 Non-Reactive Non-Reactive Final        MICROBIOLOGY:     Cultures :   Lab Results   Component Value Date    BC 24 Hours no growth 09/29/2021    BC  09/28/2021     This organism was isolated in one set. Susceptibility testing is not routinely done as this  organism frequently represents skin contamination.   Additional testing can be ordered by calling the  Microbiology Department. BC 5 Days no growth 06/21/2021    ORG Staphylococcus coagulase-negative 10/01/2021    ORG Streptococcus species 10/01/2021    ORG Staphylococcus coagulase-negative 10/01/2021    ORG Staphylococcus coagulase-negative 10/01/2021     Lab Results   Component Value Date    BLOODCULT2 24 Hours no growth 09/29/2021    BLOODCULT2 24 Hours no growth 09/28/2021    BLOODCULT2 5 Days no growth 06/21/2021    ORG Staphylococcus coagulase-negative 10/01/2021    ORG Streptococcus species 10/01/2021    ORG Staphylococcus coagulase-negative 10/01/2021    ORG Staphylococcus coagulase-negative 10/01/2021       WOUND/ABSCESS   Date Value Ref Range Status   05/16/2021   Final    Heavy growth  Refer to previous culture (CXWND: Leg-Left collected on 5-14-21 at 1426)  for susceptibility results     05/14/2021   Final    Heavy growth  Methicillin resistant Staph aureus isolated. Most Methicillin  resistant Staphylococcus are usually resistant to multiple  antibiotics including other B-Lactams, Aminoglycosides,  Macrolides, Clindamycin and Tetracycline. Contact isolation  is indicated. 02/22/2021 Growth not present  Final       Smear, Respiratory   Date Value Ref Range Status   02/18/2021   Final    Group 6: <25 PMN's/LPF and <25 Epithelial cells/LPF  Rare Polymorphonuclear leukocytes  Rare Epithelial cells  Few Gram positive diplococci  Rare Gram negative rods           Component Value Date/Time    AFBCX No growth after 6 weeks of incubation. 05/18/2021 0727    FUNGSM 1+ Yeast 05/18/2021 0727    LABFUNG  05/18/2021 0727     Moderate growth  No further workup  Unable to identify be conventional methods      LABFUNG No growth after 4 weeks of incubation.  11/06/2020 1142     CULTURE, RESPIRATORY   Date Value Ref Range Status   02/18/2021 Oral Pharyngeal Celine reduced (A)  Final   02/18/2021 Rare growth  Final   02/18/2021 Moderate growth  Final     Culture Catheter Tip   Date Value Ref Range Status 10/01/2021 <15 colonies  Final     Body Fluid Culture, Sterile   Date Value Ref Range Status   09/29/2021 Growth not present  Final     Culture Surgical   Date Value Ref Range Status   05/18/2021   Final    Heavy growth  Methicillin resistant Staph aureus isolated. Most Methicillin  resistant Staphylococcus are usually resistant to multiple  antibiotics including other B-Lactams, Aminoglycosides,  Macrolides, Clindamycin and Tetracycline. Contact isolation  is indicated. 05/18/2021 Light growth  Final   02/23/2021 Growth not present  Final     Urine Culture, Routine   Date Value Ref Range Status   09/28/2021 Growth not present  Final   07/11/2021 75,000 CFU/ml  Final   06/29/2021   Final    >100,000 CFU/ml  Vancomycin resistant Enterococci isolated       MRSA Culture Only   Date Value Ref Range Status   12/27/2020 Methicillin resistant Staph aureus not isolated  Final   10/13/2020 Methicillin resistant Staph aureus not isolated  Final   10/30/2019 Methicillin resistant Staph aureus not isolated  Final          FINAL IMPRESSION    Patient is a 36 y.o. female who presented with   Chief Complaint   Patient presents with    Altered Mental Status     to er via ems from home unresponsive.  last known well was 0100 today per ems    and admitted for ESRD (end stage renal disease) ([facility] [N18.6]  Sepsis ([facility] [A41.9]  Chronic renal failure syndrome, stage 4 (severe) ([facility] [N18.4]  Sepsis with acute organ dysfunction, due to unspecified organism, unspecified type, unspecified whether septic shock present ([facility] [A41.9, R65.20]     Leukocytosis/fevers ELEVATED PROCAL   eval for sepsis  Still has pd cath eval for peritonitis and clabsi with hd cath and cons bacteremia   RHINOVIRUS  H/o urine 7/11 Serratia, 6/29 vre  H/o mrsa left le now healed  H/o cdiff 10/2020.,/2020    Pd cath gpo  CONS   Hd cath  strep/CONS  vancomycin (VANCOCIN) 500 mg in sodium chloride 0.9 % 100 mL IVPB (mini-bag), Once given on 10/2     vancomycin

## 2021-10-03 NOTE — PROGRESS NOTES
Pharmacy Consultation Note  (Antibiotic Dosing and Monitoring)    Initial consult date: 21  Consulting physician: Dr Burroughs Mention  Drug(s): Vancomycin IV  Indication: Pneumonia, Positive blood culture    Ht Readings from Last 1 Encounters:   21 5' 4\" (1.626 m)     Wt Readings from Last 1 Encounters:   10/02/21 150 lb 5.7 oz (68.2 kg)     Age/  Gender Act BW IBW DW  Allergy Information   36 y.o.   female 69.1 kg 54.7 kg 60.5 kg  Cefepime and Toradol [ketorolac tromethamine]          Date  WBC HD Drug/Dose Time   Given Level(s)   (Time) Comments     (#1) 15.3 -- Vancomycin 1000 mg IV x 1 1216       (#2) 12.4 HD x 50 minutes No dose -- Random @ 0549 = 18.4 mcg/mL      (#3) 9.7 HD x 180 minutes Vancomycin 1000 mg IV x 1 after dialysis 2130     10/1  (#4) 6.8 -- No dose --     10/2  (#5) 6.7 HD x 120 minutes Vancomycin 500 mg IV x 1 1941 Random @ 4615 = 25.4 mcg/mL    10/3  (#6) 8.6 -- No dose --       (#7)           Estimated Creatinine Clearance: 18 mL/min (A) (based on SCr of 4.5 mg/dL (H)). UOP over the past 24 hours:       Intake/Output Summary (Last 24 hours) at 10/3/2021 1609  Last data filed at 10/3/2021 1401  Gross per 24 hour   Intake 613 ml   Output 150 ml   Net 463 ml       Temp max: Temp (24hrs), Av.1 °F (36.7 °C), Min:97.9 °F (36.6 °C), Max:98.3 °F (36.8 °C)      Antibiotic Regimen:  Antibiotic Dose Date Initiated   Zosyn 3.375 g IV Q12H      Cultures:  available culture and sensitivity results were reviewed in EPIC  Cultures sent and are pending. Culture Date Result    Blood cx #1  MR-CoNS   Blood cx #2  NGTD   Covid-19  Not detected   Respiratory Panel  Rhinovirus   Urine cx  No growth   Body fluid (ascitic)  No organisms seen   Blood cx #3  NGTD   Blood cx #4  NGTD   Tip cx;  Abdomen 10/1 Mixed GPOs   Tip cx; Femeral 10/1 NGTD     Assessment:  · Consulted by Dr. Burroughs Mention to dose/monitor vancomycin  · Goal trough level:  15-20 mcg/mL  · Pt is a 31 y/o F with a Hx of noncompliance to home medications and frequent resultant hospital admissions. Admitted for decreased responsiveness and hypoxia  · Peritoneal dialysis outpatient. Has Tessio to use for HD inpatient  · 9/29: Random @ 0549 = 18.4 mcg/mL  · 9/30: HD yesterday x only 50 minutes d/t malfunctioning cath.  Planning for HD today then removal of both HD/PD caths  · 10/1: Peritoneal catheter removed  · 10/2: Random @ 6890 = 25.4 mcg/mL    Plan:  · No HD; no dose vancomycin today  · Levels as needed  · Random level tomorrow morning  · Pharmacist will follow and monitor/adjust dosing as necessary      Thank you for the consult,    Mana Alexis, PharmRADHA 10/3/2021 4:09 PM   [phone]

## 2021-10-03 NOTE — PROGRESS NOTES
Patient stated she felt hypoglycemic. Checked blood sugar . Mini Carolina BS43. administer 2 tubes of oral glucose 15 gm per order. . rechecked Blood sugar remain low. . BS 40. Administered D50 12.5grams.  Recheck BS in 30 minutes

## 2021-10-03 NOTE — PROGRESS NOTES
Department of Internal Medicine  Nephrology Progress Note      Events reviewed.      SUBJECTIVE:  We are followiong Wilton Flores for ESRD on HD. Had dialysis yesterday.     PHYSICAL EXAM:      Vitals:    VITALS:  BP (!) 187/91   Pulse 99   Temp 98.3 °F (36.8 °C) (Oral)   Resp 20   Ht 5' 4\" (1.626 m)   Wt 150 lb 5.7 oz (68.2 kg)   LMP  (LMP Unknown)   SpO2 98%   BMI 25.81 kg/m²   24HR INTAKE/OUTPUT:      Intake/Output Summary (Last 24 hours) at 10/3/2021 0835  Last data filed at 10/3/2021 0447  Gross per 24 hour   Intake 120 ml   Output 1450 ml   Net -1330 ml       Access: right femoral temporary dialysis catheter   Constitutional: Awake in no acute distress  HEENT:  Normocephalic, PERRL  Respiratory: Decreased breath sounds on the basis  Cardiovascular/Edema:  RRR, S1/S2  Gastrointestinal:  Soft, PD catheter exit site clean   Neurologic:  Nonfocal, AVELAR  Skin:  Warm, dry, no lesions  Other:  no edema     DATA:    CBC:   Lab Results   Component Value Date    WBC 8.6 10/03/2021    RBC 2.91 10/03/2021    HGB 8.6 10/03/2021    HCT 27.1 10/03/2021    MCV 93.1 10/03/2021    MCH 29.6 10/03/2021    MCHC 31.7 10/03/2021    RDW 15.1 10/03/2021     10/03/2021    MPV 10.0 10/03/2021     CMP:    Lab Results   Component Value Date     10/03/2021    K 4.5 10/03/2021     10/03/2021    CO2 27 10/03/2021    BUN 13 10/03/2021    CREATININE 4.5 10/03/2021    GFRAA 14 10/03/2021    LABGLOM 14 10/03/2021    GLUCOSE 106 10/03/2021    GLUCOSE 130 05/18/2012    PROT 4.9 09/28/2021    LABALBU 2.0 09/28/2021    LABALBU 4.1 05/18/2012    CALCIUM 8.1 10/03/2021    BILITOT <0.2 09/28/2021    ALKPHOS 213 09/28/2021    AST 13 09/28/2021    ALT 11 09/28/2021     Magnesium:    Lab Results   Component Value Date    MG 1.7 09/28/2021     Phosphorus:    Lab Results   Component Value Date    PHOS 5.9 09/14/2021     Radiology Review:         CXR 9/08/2021   No acute cardiopulmonary process.         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 She was minimally responsive and hypoxemic. She has been admitted with a diagnosis of possible sepsis. She was given vancomycin and piperacillin-tazobactam in the ER. Patient reports having intermittent nausea and vomiting but denies any diarrhea, fever or chills. 1. ESRD on home HD dialysis training, to continue HD 3 times a week while in the hospital.   2. Hyperkalemia 5.1, 2/2 decreased GFR (ESRD). Improved with dialysis yesterday  3. Metabolic alkalosis (pH 1.588, bicarb level 34) due to combination of vomiting and dialysis, with respiratory compensation  4. HTN, on metoprolol  5. MBD of CKD, on sevelamer   6. Anemia of CKD, on epoetin alpha    7. Restless leg syndrome, on ropinirole  -------------------------------------------------------------------  5. Altered mental status, resolved, hypoglycemia? 6. Possible sepsis, procalcitonin level 1.33, has received piperacillin-tazobactam and vancomycin  7. History of gastroparesis, on metoclopramide  8. Sepsis /peritonitis. S/p removal of tunneled dialysis catheter and PD catheter 10/1.     Plan:    · For dialysis tomorrow  · Tunneled dialysis catheter removed 10/1 with placement of right femoral temporary line  · PD cath removed 10/1  · Continue to monitor daily labs  · Continue epoetin alpha 5000 units 3 times a week  · Continue low potassium diet      Trina Meyer MD

## 2021-10-04 ENCOUNTER — TELEPHONE (OUTPATIENT)
Dept: INTERNAL MEDICINE | Age: 31
End: 2021-10-04

## 2021-10-04 LAB
BLOOD CULTURE, ROUTINE: NORMAL
CULTURE CATHETER TIP: ABNORMAL
CULTURE, BLOOD 2: NORMAL
HAV IGM SER IA-ACNC: NORMAL
HEPATITIS B CORE IGM ANTIBODY: NORMAL
HEPATITIS B SURFACE ANTIGEN INTERPRETATION: NORMAL
HEPATITIS C ANTIBODY INTERPRETATION: NORMAL
ORGANISM: ABNORMAL

## 2021-10-04 NOTE — TELEPHONE ENCOUNTER
[facility] Transitions Initial Follow Up Call    Outreach made within 2 business days of discharge: Yes    Patient: Pavan Ibarra Patient : 1990   MRN: [id number]  Reason for Admission: There are no discharge diagnoses documented for the most recent discharge. Discharge Date: 10/3/21       Spoke with: Left voicemail to return call and scheduled TCM follow up.         Follow Up  Future Appointments   Date Time Provider Emiliana De Los Santos   10/13/2021 10:00 AM Zeke Simon MD [place], [place]

## 2021-10-05 RX ORDER — LINEZOLID 600 MG/1
600 TABLET, FILM COATED ORAL 2 TIMES DAILY
Qty: 20 TABLET | Refills: 0 | Status: SHIPPED | OUTPATIENT
Start: 2021-10-05 | End: 2021-10-05 | Stop reason: SINTOL

## 2021-10-05 RX ORDER — TEDIZOLID PHOSPHATE 200 MG/1
200 TABLET, FILM COATED ORAL DAILY
Qty: 6 TABLET | Refills: 0 | Status: SHIPPED | OUTPATIENT
Start: 2021-10-05 | End: 2021-10-11

## 2021-10-05 NOTE — PROGRESS NOTES
[facility]    Enterococcus faecium    Culture Catheter Tip 10/01/2021 12:00 PM [address] [facility]   >15 colonies   Vancomycin resistant Enterococci isolated    Staphylococcus coagulase-negative    >15 colonies   Grey strain    Staphylococcus coagulase-negative    >15 colonies   White strain       Narrative  Performed by: [address] [place] [address] Lab  Source: [facility]       Site: Abdomen&Abdomen             Susceptibility    Enterococcus faecium (1)    Antibiotic Interpretation DEENA   ampicillin Resistant >=^32 mcg/mL   doxycycline Resistant >=^16 mcg/mL   gentamicin-syn Sensitive SYN-S mcg/mL   linezolid Sensitive ^2 mcg/mL   streptomycin-syn Resistant SYN-R mcg/mL   vancomycin Resistant >=^32 mcg/mL     WILL ORDER SIVEXTRO CALLED PT'S NUMBER MOTHER ANSWERED THE PHONE   UPDATED POC  Orders Placed This Encounter   Medications    DISCONTD: linezolid (ZYVOX) 600 MG tablet     Sig: Take 1 tablet by mouth 2 times daily for 10 days     Dispense:  20 tablet     Refill:  0    Tedizolid Phosphate (SIVEXTRO) 200 MG TABS     Sig: Take 200 mg by mouth daily for 6 days     Dispense:  6 tablet     Refill:  0     Radha Mcfadden MD  8:09 PM

## 2021-11-11 ENCOUNTER — APPOINTMENT (OUTPATIENT)
Dept: GENERAL RADIOLOGY | Age: 31
DRG: 420 | End: 2021-11-11
Payer: COMMERCIAL

## 2021-11-11 ENCOUNTER — HOSPITAL ENCOUNTER (INPATIENT)
Age: 31
LOS: 4 days | Discharge: HOME | DRG: 420 | End: 2021-11-15
Attending: STUDENT IN AN ORGANIZED HEALTH CARE EDUCATION/TRAINING PROGRAM | Admitting: INTERNAL MEDICINE
Payer: COMMERCIAL

## 2021-11-11 DIAGNOSIS — N18.6 ESRD (END STAGE RENAL DISEASE) ON DIALYSIS (HCC): ICD-10-CM

## 2021-11-11 DIAGNOSIS — Z99.2 ESRD (END STAGE RENAL DISEASE) ON DIALYSIS (HCC): ICD-10-CM

## 2021-11-11 DIAGNOSIS — M54.50 CHRONIC RIGHT-SIDED LOW BACK PAIN, UNSPECIFIED WHETHER SCIATICA PRESENT: ICD-10-CM

## 2021-11-11 DIAGNOSIS — G89.29 CHRONIC RIGHT-SIDED LOW BACK PAIN, UNSPECIFIED WHETHER SCIATICA PRESENT: ICD-10-CM

## 2021-11-11 DIAGNOSIS — I73.89 HHS (HYPOTHENAR HAMMER SYNDROME) (HCC): Primary | ICD-10-CM

## 2021-11-11 PROBLEM — I16.0 HYPERTENSIVE URGENCY: Status: ACTIVE | Noted: 2021-11-11

## 2021-11-11 PROBLEM — E11.00 HYPEROSMOLAR HYPERGLYCEMIC STATE (HHS) (HCC): Status: ACTIVE | Noted: 2021-11-11

## 2021-11-11 LAB
ALBUMIN SERPL-MCNC: 3.4 G/DL (ref 3.5–5.2)
ALP BLD-CCNC: 163 U/L (ref 35–104)
ALT SERPL-CCNC: 7 U/L (ref 0–32)
ANION GAP SERPL CALCULATED.3IONS-SCNC: 12 MMOL/L (ref 7–16)
ANION GAP SERPL CALCULATED.3IONS-SCNC: 9 MMOL/L (ref 7–16)
AST SERPL-CCNC: 7 U/L (ref 0–31)
BASOPHILS ABSOLUTE: 0.16 E9/L (ref 0–0.2)
BASOPHILS RELATIVE PERCENT: 1.6 % (ref 0–2)
BETA-HYDROXYBUTYRATE: 0.2 MMOL/L (ref 0.02–0.27)
BILIRUB SERPL-MCNC: 0.2 MG/DL (ref 0–1.2)
BUN BLDV-MCNC: 30 MG/DL (ref 6–20)
BUN BLDV-MCNC: 7 MG/DL (ref 6–20)
CALCIUM SERPL-MCNC: 7.5 MG/DL (ref 8.6–10.2)
CALCIUM SERPL-MCNC: 8.3 MG/DL (ref 8.6–10.2)
CHLORIDE BLD-SCNC: 90 MMOL/L (ref 98–107)
CHLORIDE BLD-SCNC: 95 MMOL/L (ref 98–107)
CHP ED QC CHECK: NORMAL
CHP ED QC CHECK: YES
CHP ED QC CHECK: YES
CO2: 30 MMOL/L (ref 22–29)
CO2: 32 MMOL/L (ref 22–29)
CREAT SERPL-MCNC: 1.9 MG/DL (ref 0.5–1)
CREAT SERPL-MCNC: 6 MG/DL (ref 0.5–1)
EOSINOPHILS ABSOLUTE: 0.43 E9/L (ref 0.05–0.5)
EOSINOPHILS RELATIVE PERCENT: 4.4 % (ref 0–6)
GFR AFRICAN AMERICAN: 10
GFR AFRICAN AMERICAN: 38
GFR NON-AFRICAN AMERICAN: 10 ML/MIN/1.73
GFR NON-AFRICAN AMERICAN: 38 ML/MIN/1.73
GLUCOSE BLD-MCNC: 190 MG/DL
GLUCOSE BLD-MCNC: 707 MG/DL (ref 74–99)
GLUCOSE BLD-MCNC: 90 MG/DL
GLUCOSE BLD-MCNC: 93 MG/DL (ref 74–99)
HCT VFR BLD CALC: 24 % (ref 34–48)
HEMOGLOBIN: 7.3 G/DL (ref 11.5–15.5)
IMMATURE GRANULOCYTES #: 0.03 E9/L
IMMATURE GRANULOCYTES %: 0.3 % (ref 0–5)
LACTIC ACID: 2.1 MMOL/L (ref 0.5–2.2)
LYMPHOCYTES ABSOLUTE: 2.26 E9/L (ref 1.5–4)
LYMPHOCYTES RELATIVE PERCENT: 23.3 % (ref 20–42)
MAGNESIUM: 1.5 MG/DL (ref 1.6–2.6)
MCH RBC QN AUTO: 28.2 PG (ref 26–35)
MCHC RBC AUTO-ENTMCNC: 30.4 % (ref 32–34.5)
MCV RBC AUTO: 92.7 FL (ref 80–99.9)
METER GLUCOSE: 190 MG/DL (ref 74–99)
METER GLUCOSE: 374 MG/DL (ref 74–99)
METER GLUCOSE: 69 MG/DL (ref 74–99)
METER GLUCOSE: 77 MG/DL (ref 74–99)
METER GLUCOSE: 79 MG/DL (ref 74–99)
METER GLUCOSE: 90 MG/DL (ref 74–99)
METER GLUCOSE: 93 MG/DL (ref 74–99)
METER GLUCOSE: >500 MG/DL (ref 74–99)
MONOCYTES ABSOLUTE: 0.26 E9/L (ref 0.1–0.95)
MONOCYTES RELATIVE PERCENT: 2.7 % (ref 2–12)
NEUTROPHILS ABSOLUTE: 6.58 E9/L (ref 1.8–7.3)
NEUTROPHILS RELATIVE PERCENT: 67.7 % (ref 43–80)
PDW BLD-RTO: 17.2 FL (ref 11.5–15)
PHOSPHORUS: 1.3 MG/DL (ref 2.5–4.5)
PLATELET # BLD: 192 E9/L (ref 130–450)
PMV BLD AUTO: 10.9 FL (ref 7–12)
POTASSIUM REFLEX MAGNESIUM: 4.8 MMOL/L (ref 3.5–5)
POTASSIUM SERPL-SCNC: 3.2 MMOL/L (ref 3.5–5)
PRO-BNP: ABNORMAL PG/ML (ref 0–125)
RBC # BLD: 2.59 E12/L (ref 3.5–5.5)
SODIUM BLD-SCNC: 132 MMOL/L (ref 132–146)
SODIUM BLD-SCNC: 136 MMOL/L (ref 132–146)
TOTAL PROTEIN: 6.1 G/DL (ref 6.4–8.3)
TROPONIN, HIGH SENSITIVITY: 318 NG/L (ref 0–9)
WBC # BLD: 9.7 E9/L (ref 4.5–11.5)

## 2021-11-11 PROCEDURE — 82962 GLUCOSE BLOOD TEST: CPT

## 2021-11-11 PROCEDURE — 80048 BASIC METABOLIC PNL TOTAL CA: CPT

## 2021-11-11 PROCEDURE — 2580000003 HC RX 258: Performed by: NURSE PRACTITIONER

## 2021-11-11 PROCEDURE — 84484 ASSAY OF TROPONIN QUANT: CPT

## 2021-11-11 PROCEDURE — 82010 KETONE BODYS QUAN: CPT

## 2021-11-11 PROCEDURE — 6360000002 HC RX W HCPCS: Performed by: INTERNAL MEDICINE

## 2021-11-11 PROCEDURE — 83540 ASSAY OF IRON: CPT

## 2021-11-11 PROCEDURE — 80053 COMPREHEN METABOLIC PANEL: CPT

## 2021-11-11 PROCEDURE — 83880 ASSAY OF NATRIURETIC PEPTIDE: CPT

## 2021-11-11 PROCEDURE — 2580000003 HC RX 258: Performed by: INTERNAL MEDICINE

## 2021-11-11 PROCEDURE — 83605 ASSAY OF LACTIC ACID: CPT

## 2021-11-11 PROCEDURE — 6370000000 HC RX 637 (ALT 250 FOR IP): Performed by: INTERNAL MEDICINE

## 2021-11-11 PROCEDURE — 83550 IRON BINDING TEST: CPT

## 2021-11-11 PROCEDURE — 83735 ASSAY OF MAGNESIUM: CPT

## 2021-11-11 PROCEDURE — 96372 THER/PROPH/DIAG INJ SC/IM: CPT

## 2021-11-11 PROCEDURE — 36415 COLL VENOUS BLD VENIPUNCTURE: CPT

## 2021-11-11 PROCEDURE — 99219 PR INITIAL OBSERVATION CARE/DAY 50 MINUTES: CPT | Performed by: INTERNAL MEDICINE

## 2021-11-11 PROCEDURE — 96376 TX/PRO/DX INJ SAME DRUG ADON: CPT

## 2021-11-11 PROCEDURE — 96374 THER/PROPH/DIAG INJ IV PUSH: CPT

## 2021-11-11 PROCEDURE — 6370000000 HC RX 637 (ALT 250 FOR IP): Performed by: STUDENT IN AN ORGANIZED HEALTH CARE EDUCATION/TRAINING PROGRAM

## 2021-11-11 PROCEDURE — 93005 ELECTROCARDIOGRAM TRACING: CPT | Performed by: STUDENT IN AN ORGANIZED HEALTH CARE EDUCATION/TRAINING PROGRAM

## 2021-11-11 PROCEDURE — 90935 HEMODIALYSIS ONE EVALUATION: CPT

## 2021-11-11 PROCEDURE — 2500000003 HC RX 250 WO HCPCS: Performed by: NURSE PRACTITIONER

## 2021-11-11 PROCEDURE — 84100 ASSAY OF PHOSPHORUS: CPT

## 2021-11-11 PROCEDURE — 2060000000 HC ICU INTERMEDIATE R&B

## 2021-11-11 PROCEDURE — 2500000003 HC RX 250 WO HCPCS: Performed by: INTERNAL MEDICINE

## 2021-11-11 PROCEDURE — 2580000003 HC RX 258: Performed by: STUDENT IN AN ORGANIZED HEALTH CARE EDUCATION/TRAINING PROGRAM

## 2021-11-11 PROCEDURE — 85025 COMPLETE CBC W/AUTO DIFF WBC: CPT

## 2021-11-11 PROCEDURE — 5A1D70Z PERFORMANCE OF URINARY FILTRATION, INTERMITTENT, LESS THAN 6 HOURS PER DAY: ICD-10-PCS | Performed by: INTERNAL MEDICINE

## 2021-11-11 PROCEDURE — 02HV33Z INSERTION OF INFUSION DEVICE INTO SUPERIOR VENA CAVA, PERCUTANEOUS APPROACH: ICD-10-PCS | Performed by: STUDENT IN AN ORGANIZED HEALTH CARE EDUCATION/TRAINING PROGRAM

## 2021-11-11 PROCEDURE — 99284 EMERGENCY DEPT VISIT MOD MDM: CPT

## 2021-11-11 PROCEDURE — 71045 X-RAY EXAM CHEST 1 VIEW: CPT

## 2021-11-11 RX ORDER — ACETAMINOPHEN 325 MG/1
650 TABLET ORAL EVERY 6 HOURS PRN
Status: DISCONTINUED | OUTPATIENT
Start: 2021-11-11 | End: 2021-11-15 | Stop reason: HOSPADM

## 2021-11-11 RX ORDER — LEVOTHYROXINE SODIUM 0.07 MG/1
75 TABLET ORAL DAILY
Status: DISCONTINUED | OUTPATIENT
Start: 2021-11-11 | End: 2021-11-15 | Stop reason: HOSPADM

## 2021-11-11 RX ORDER — DEXTROSE MONOHYDRATE 25 G/50ML
25 INJECTION, SOLUTION INTRAVENOUS PRN
Status: DISCONTINUED | OUTPATIENT
Start: 2021-11-11 | End: 2021-11-15 | Stop reason: HOSPADM

## 2021-11-11 RX ORDER — SODIUM CHLORIDE 0.9 % (FLUSH) 0.9 %
5-40 SYRINGE (ML) INJECTION EVERY 12 HOURS SCHEDULED
Status: DISCONTINUED | OUTPATIENT
Start: 2021-11-11 | End: 2021-11-15 | Stop reason: HOSPADM

## 2021-11-11 RX ORDER — INSULIN GLARGINE 100 [IU]/ML
10 INJECTION, SOLUTION SUBCUTANEOUS 2 TIMES DAILY
Status: DISCONTINUED | OUTPATIENT
Start: 2021-11-11 | End: 2021-11-14

## 2021-11-11 RX ORDER — ACETAMINOPHEN 650 MG/1
650 SUPPOSITORY RECTAL EVERY 6 HOURS PRN
Status: DISCONTINUED | OUTPATIENT
Start: 2021-11-11 | End: 2021-11-15 | Stop reason: HOSPADM

## 2021-11-11 RX ORDER — DEXTROSE AND SODIUM CHLORIDE 5; .45 G/100ML; G/100ML
INJECTION, SOLUTION INTRAVENOUS CONTINUOUS
Status: DISCONTINUED | OUTPATIENT
Start: 2021-11-11 | End: 2021-11-12

## 2021-11-11 RX ORDER — BUMETANIDE 1 MG/1
2 TABLET ORAL 2 TIMES DAILY
Status: DISCONTINUED | OUTPATIENT
Start: 2021-11-11 | End: 2021-11-15 | Stop reason: HOSPADM

## 2021-11-11 RX ORDER — ROPINIROLE 0.25 MG/1
0.25 TABLET, FILM COATED ORAL NIGHTLY
Status: DISCONTINUED | OUTPATIENT
Start: 2021-11-11 | End: 2021-11-15 | Stop reason: HOSPADM

## 2021-11-11 RX ORDER — MORPHINE SULFATE 4 MG/ML
4 INJECTION, SOLUTION INTRAMUSCULAR; INTRAVENOUS ONCE
Status: CANCELLED | OUTPATIENT
Start: 2021-11-11 | End: 2021-11-11

## 2021-11-11 RX ORDER — MIRTAZAPINE 15 MG/1
15 TABLET, FILM COATED ORAL NIGHTLY
Status: DISCONTINUED | OUTPATIENT
Start: 2021-11-11 | End: 2021-11-15 | Stop reason: HOSPADM

## 2021-11-11 RX ORDER — FENTANYL CITRATE 50 UG/ML
25 INJECTION, SOLUTION INTRAMUSCULAR; INTRAVENOUS
Status: DISCONTINUED | OUTPATIENT
Start: 2021-11-11 | End: 2021-11-12

## 2021-11-11 RX ORDER — DEXTROSE MONOHYDRATE 25 G/50ML
12.5 INJECTION, SOLUTION INTRAVENOUS ONCE
Status: COMPLETED | OUTPATIENT
Start: 2021-11-11 | End: 2021-11-11

## 2021-11-11 RX ORDER — ONDANSETRON 2 MG/ML
4 INJECTION INTRAMUSCULAR; INTRAVENOUS EVERY 6 HOURS PRN
Status: DISCONTINUED | OUTPATIENT
Start: 2021-11-11 | End: 2021-11-15 | Stop reason: HOSPADM

## 2021-11-11 RX ORDER — DEXTROSE MONOHYDRATE 50 MG/ML
100 INJECTION, SOLUTION INTRAVENOUS PRN
Status: DISCONTINUED | OUTPATIENT
Start: 2021-11-11 | End: 2021-11-15 | Stop reason: HOSPADM

## 2021-11-11 RX ORDER — DEXTROSE AND SODIUM CHLORIDE 5; .45 G/100ML; G/100ML
INJECTION, SOLUTION INTRAVENOUS CONTINUOUS
Status: ACTIVE | OUTPATIENT
Start: 2021-11-11 | End: 2021-11-12

## 2021-11-11 RX ORDER — ARIPIPRAZOLE 5 MG/1
5 TABLET ORAL NIGHTLY
Status: DISCONTINUED | OUTPATIENT
Start: 2021-11-11 | End: 2021-11-15 | Stop reason: HOSPADM

## 2021-11-11 RX ORDER — POLYETHYLENE GLYCOL 3350 17 G/17G
17 POWDER, FOR SOLUTION ORAL DAILY PRN
Status: DISCONTINUED | OUTPATIENT
Start: 2021-11-11 | End: 2021-11-15 | Stop reason: HOSPADM

## 2021-11-11 RX ORDER — FENTANYL CITRATE 50 UG/ML
50 INJECTION, SOLUTION INTRAMUSCULAR; INTRAVENOUS ONCE
Status: DISCONTINUED | OUTPATIENT
Start: 2021-11-11 | End: 2021-11-15 | Stop reason: HOSPADM

## 2021-11-11 RX ORDER — SEVELAMER CARBONATE 800 MG/1
800 TABLET, FILM COATED ORAL
Status: DISCONTINUED | OUTPATIENT
Start: 2021-11-11 | End: 2021-11-15 | Stop reason: HOSPADM

## 2021-11-11 RX ORDER — HEPARIN SODIUM 5000 [USP'U]/ML
5000 INJECTION, SOLUTION INTRAVENOUS; SUBCUTANEOUS EVERY 8 HOURS SCHEDULED
Status: DISCONTINUED | OUTPATIENT
Start: 2021-11-11 | End: 2021-11-15 | Stop reason: HOSPADM

## 2021-11-11 RX ORDER — PANTOPRAZOLE SODIUM 40 MG/1
40 TABLET, DELAYED RELEASE ORAL DAILY PRN
Status: DISCONTINUED | OUTPATIENT
Start: 2021-11-11 | End: 2021-11-15 | Stop reason: HOSPADM

## 2021-11-11 RX ORDER — SODIUM CHLORIDE 0.9 % (FLUSH) 0.9 %
5-40 SYRINGE (ML) INJECTION PRN
Status: DISCONTINUED | OUTPATIENT
Start: 2021-11-11 | End: 2021-11-15 | Stop reason: HOSPADM

## 2021-11-11 RX ORDER — HYDRALAZINE HYDROCHLORIDE 20 MG/ML
10 INJECTION INTRAMUSCULAR; INTRAVENOUS EVERY 6 HOURS PRN
Status: DISCONTINUED | OUTPATIENT
Start: 2021-11-11 | End: 2021-11-15 | Stop reason: HOSPADM

## 2021-11-11 RX ORDER — 0.9 % SODIUM CHLORIDE 0.9 %
500 INTRAVENOUS SOLUTION INTRAVENOUS ONCE
Status: COMPLETED | OUTPATIENT
Start: 2021-11-11 | End: 2021-11-11

## 2021-11-11 RX ORDER — NICOTINE POLACRILEX 4 MG
15 LOZENGE BUCCAL PRN
Status: DISCONTINUED | OUTPATIENT
Start: 2021-11-11 | End: 2021-11-15 | Stop reason: HOSPADM

## 2021-11-11 RX ORDER — DEXTROSE MONOHYDRATE 25 G/50ML
12.5 INJECTION, SOLUTION INTRAVENOUS PRN
Status: DISCONTINUED | OUTPATIENT
Start: 2021-11-11 | End: 2021-11-15 | Stop reason: HOSPADM

## 2021-11-11 RX ORDER — ONDANSETRON 4 MG/1
4 TABLET, ORALLY DISINTEGRATING ORAL EVERY 8 HOURS PRN
Status: DISCONTINUED | OUTPATIENT
Start: 2021-11-11 | End: 2021-11-15 | Stop reason: HOSPADM

## 2021-11-11 RX ORDER — SODIUM CHLORIDE 9 MG/ML
25 INJECTION, SOLUTION INTRAVENOUS PRN
Status: DISCONTINUED | OUTPATIENT
Start: 2021-11-11 | End: 2021-11-15 | Stop reason: HOSPADM

## 2021-11-11 RX ORDER — ERGOCALCIFEROL 1.25 MG/1
50000 CAPSULE ORAL WEEKLY
Status: DISCONTINUED | OUTPATIENT
Start: 2021-11-15 | End: 2021-11-15 | Stop reason: HOSPADM

## 2021-11-11 RX ADMIN — BUMETANIDE 2 MG: 1 TABLET ORAL at 22:06

## 2021-11-11 RX ADMIN — EPOETIN ALFA-EPBX 10000 UNITS: 10000 INJECTION, SOLUTION INTRAVENOUS; SUBCUTANEOUS at 11:41

## 2021-11-11 RX ADMIN — DEXTROSE MONOHYDRATE 12.5 G: 25 INJECTION, SOLUTION INTRAVENOUS at 16:19

## 2021-11-11 RX ADMIN — FENTANYL CITRATE 25 MCG: 50 INJECTION, SOLUTION INTRAMUSCULAR; INTRAVENOUS at 17:36

## 2021-11-11 RX ADMIN — HYDRALAZINE HYDROCHLORIDE 10 MG: 20 INJECTION INTRAMUSCULAR; INTRAVENOUS at 17:46

## 2021-11-11 RX ADMIN — FENTANYL CITRATE 25 MCG: 50 INJECTION, SOLUTION INTRAMUSCULAR; INTRAVENOUS at 19:54

## 2021-11-11 RX ADMIN — FENTANYL CITRATE 25 MCG: 50 INJECTION, SOLUTION INTRAMUSCULAR; INTRAVENOUS at 12:50

## 2021-11-11 RX ADMIN — ARIPIPRAZOLE 5 MG: 5 TABLET ORAL at 22:07

## 2021-11-11 RX ADMIN — SODIUM CHLORIDE 500 ML: 9 INJECTION, SOLUTION INTRAVENOUS at 10:57

## 2021-11-11 RX ADMIN — SODIUM CHLORIDE 0.1 UNITS/KG/HR: 9 INJECTION, SOLUTION INTRAVENOUS at 11:42

## 2021-11-11 RX ADMIN — FENTANYL CITRATE 25 MCG: 50 INJECTION, SOLUTION INTRAMUSCULAR; INTRAVENOUS at 23:12

## 2021-11-11 RX ADMIN — MIRTAZAPINE 15 MG: 15 TABLET, FILM COATED ORAL at 22:07

## 2021-11-11 RX ADMIN — INSULIN HUMAN 15 UNITS: 100 INJECTION, SOLUTION PARENTERAL at 11:41

## 2021-11-11 RX ADMIN — DEXTROSE AND SODIUM CHLORIDE: 5; 450 INJECTION, SOLUTION INTRAVENOUS at 21:26

## 2021-11-11 RX ADMIN — SODIUM CHLORIDE 25 ML: 9 INJECTION, SOLUTION INTRAVENOUS at 17:25

## 2021-11-11 RX ADMIN — DEXTROSE AND SODIUM CHLORIDE: 5; 450 INJECTION, SOLUTION INTRAVENOUS at 23:35

## 2021-11-11 RX ADMIN — HEPARIN SODIUM 5000 UNITS: 5000 INJECTION INTRAVENOUS; SUBCUTANEOUS at 22:07

## 2021-11-11 RX ADMIN — METOPROLOL TARTRATE 25 MG: 25 TABLET, FILM COATED ORAL at 22:07

## 2021-11-11 RX ADMIN — ROPINIROLE HYDROCHLORIDE 0.25 MG: 0.25 TABLET, FILM COATED ORAL at 23:12

## 2021-11-11 RX ADMIN — DEXTROSE AND SODIUM CHLORIDE: 5; 450 INJECTION, SOLUTION INTRAVENOUS at 15:26

## 2021-11-11 RX ADMIN — DEXTROSE MONOHYDRATE 25 G: 25 INJECTION, SOLUTION INTRAVENOUS at 23:41

## 2021-11-11 RX ADMIN — LEVOTHYROXINE SODIUM 75 MCG: 0.07 TABLET ORAL at 17:46

## 2021-11-11 ASSESSMENT — PAIN DESCRIPTION - PAIN TYPE
TYPE: CHRONIC PAIN
TYPE: CHRONIC PAIN
TYPE: ACUTE PAIN
TYPE: CHRONIC PAIN
TYPE: ACUTE PAIN
TYPE: CHRONIC PAIN

## 2021-11-11 ASSESSMENT — PAIN SCALES - GENERAL
PAINLEVEL_OUTOF10: 6
PAINLEVEL_OUTOF10: 10
PAINLEVEL_OUTOF10: 7
PAINLEVEL_OUTOF10: 7
PAINLEVEL_OUTOF10: 6
PAINLEVEL_OUTOF10: 0
PAINLEVEL_OUTOF10: 8
PAINLEVEL_OUTOF10: 8
PAINLEVEL_OUTOF10: 7
PAINLEVEL_OUTOF10: 0

## 2021-11-11 ASSESSMENT — ENCOUNTER SYMPTOMS
VOMITING: 1
EYE PAIN: 0
ABDOMINAL DISTENTION: 0
SINUS PRESSURE: 0
SHORTNESS OF BREATH: 0
ANAL BLEEDING: 1
WHEEZING: 0
EYE DISCHARGE: 0
EYE REDNESS: 0
DIARRHEA: 0
SORE THROAT: 0
COUGH: 0
NAUSEA: 1
BACK PAIN: 0

## 2021-11-11 ASSESSMENT — PAIN DESCRIPTION - LOCATION
LOCATION: GENERALIZED

## 2021-11-11 NOTE — ED NOTES
The patient remains in dialysis. Her 1400 sugar was 190. This was called to Dr Niki De Santiago and he said to stop the insulin drip. That was done at this time.      Nora Macias RN  11/11/21 0182

## 2021-11-11 NOTE — ED NOTES
Dr Marah Kay aware that BGL = 374 and he says to leave drip at current rate and re check in 1 hour.      Katelyn Arambula RN  11/11/21 2686

## 2021-11-11 NOTE — FLOWSHEET NOTE
11/11/21 1703   Vital Signs   BP (!) 201/117   Temp 97.3 °F (36.3 °C)   Pulse 90   Resp 16   Pain Assessment   Pain Assessment 0-10   Pain Level 0   Post-Hemodialysis Assessment   Post-Treatment Procedures Blood returned; Catheter capped, clamped and heparinized x 2 ports   Machine Disinfection Process Acid/Vinegar Clean; Heat Disinfect;  Exterior Machine Disinfection   Rinseback Volume (ml) 300 ml   Total Liters Processed (l/min) 88.1 l/min   Dialyzer Clearance Lightly streaked   Duration of Treatment (minutes) 225 minutes   Hemodialysis Intake (ml) 1200 ml   Hemodialysis Output (ml) 1200 ml   NET Removed (ml) 0 ml   Tolerated Treatment Good   Patient Response to Treatment tolerated well   Bilateral Breath Sounds Diminished   Edema Generalized   Edema Generalized +1

## 2021-11-11 NOTE — PROGRESS NOTES
Spoke with Dr Chilango Seo regarding dialysis and he approved the patient going to dialysis without the presence of an ICU or ED nurse.   ICU and ED managers notified

## 2021-11-11 NOTE — CONSULTS
Department of Internal Medicine  Nephrology Attending Consult Note      Reason for Consult:  End-Stage Renal Disease  Requesting Physician:  Dr Alma Guajardo:  emesis    History Obtained From:  patient, electronic medical record    HISTORY OF PRESENT ILLNESS:                The patient is a 36 y.o. female with significant past medical history of end stage renal disease secondary to diabetic kidney disease, on home hemodialysis  via Catheter, last hemodialysis treatment on , presents with emesis and blood sugar>500.  She was admitted with diagnosis of DKA and this ocnsultation was requested for management of her ESRD>    Past Medical History:        Diagnosis Date    Acute congestive heart failure ([address]     BC (acute kidney injury) ([address] 10/01/2019    Cardiac arrest ([address] 02/15/2021    Cephalgia 10/09/2019    Chronic kidney disease     Depression     Diabetes mellitus ([address]     Diabetic gastroparesis associated with type 1 diabetes mellitus ([address] 2018    Diabetic ketoacidosis ([address] 2011    Diabetic ketoacidosis with coma associated with type 1 diabetes mellitus ([address] 2013    Diabetic polyneuropathy associated with type 1 diabetes mellitus ([address] 2020    Drug use complicating pregnancy in third trimester     Endocarditis 10/31/2020    ESRD (end stage renal disease) ([address] 2020    H/O cardiovascular stress test 2021    Lexiscan    Hemod[facility] patient [facility])     History of blood transfusion 2019    Hyperlipidemia 10/08/2020    Hyperosmolar hyperglycemic state (HHS) ([address] 2020    Hypothyroidism 10/08/2020    Iron deficiency anemia 10/01/2019    MDRO (multiple drug resistant organisms) resistance     MRSA (methicillin resistant Staphylococcus aureus)     back wound abcess    Non compliance w medication regimen 2016    Other disorders of kidney and ureter     Pregnancy 2016    16 weeks    Previous  delivery affecting pregnancy, antepartum 03/02/2017    Previous stillbirth or demise, antepartum 02/08/2016    Seizure ([address] 11/20/2020    Severe pre-eclampsia in third trimester 08/22/2016    Shock liver 02/15/2021    Valvular endocarditis 11/10/2020    This Diagnosis was added to the Problem List based on transcribed orders from Dr. Abiola Good        Past Surgical History:        Procedure Laterality Date    BACK SURGERY      abscess    CATHETER REMOVAL N/A 10/1/2021    PERITONEAL CATHETER REMOVAL performed by Yakov Sneed MD at [facility] 49      x2   [address] [facility], LAPAROSCOPIC N/A 11/7/2019    CHOLECYSTECTOMY LAPAROSCOPIC performed by Jasper Barraza MD at [address] [facility] N/A 6/25/2018    COLONOSCOPY WITH BIOPSY performed by Luz Alex MD at [address] [facility] N/A 12/18/2018    COLONOSCOPY WITH BIOPSY performed by Miri Prieto MD at [address]  1/31/2012    EF 57%    ECHO COMPL W DOP COLOR FLOW  6/10/2013         EMBOLECTOMY N/A 11/6/2020    [address] [place] [address], [id number] [place] [address], YULISSA -- REQS ROOM 3 performed by Mark Anthony Jimenez MD at [address] 2/23/2021    LEFT LEG INCISION AND DRAINAGE, DEBRIDEMENT, WOUND VAC APPLICATION performed by Eliza Porter DPM at [place] [address] Left 5/18/2021    LEFT LEG DEBRIDEMENT BIOPSY POSS APPLICATION WOUND VAC performed by Eliza Porter DPM at [place] [address] N/A 6/26/2020    LAPAROSCOPIC INSERTION PERITONEAL DIALYSIS CATHETER performed by Riki Ugarte MD at [facility] [address] ESOPHAGOGASTRODUODENOSCOPY TRANSORAL DIAGNOSTIC N/A 5/30/2018    EGD ESOPHAGOGASTRODUODENOSCOPY performed by Luz Alex MD at [address] [facility] TRANSESOPHAGEAL ECHOCARDIOGRAM N/A 10/19/2020    TRANSESOPHAGEAL ECHOCARDIOGRAM WITH BUBBLE STUDY performed by Yelitza Diamond MD at [address] [facility] [address] [facility]  04/2018    UPPER GASTROINTESTINAL ENDOSCOPY 12/18/2018    EGD BIOPSY performed by Karla Cherry MD at [address] [facility] N/A 10/11/2019    EGD ESOPHAGOGASTRODUODENOSCOPY performed by Celine Gallego DO at [address] [facility] N/A 7/14/2021    EGD BIOPSY performed by Augustine Garibay MD at [address] [facility] ENDOSCOPY     Current Medications:    Current Facility-Administered Medications: 0.9 % sodium chloride bolus, 500 mL, IntraVENous, Once  Allergies:  Cefepime and Toradol [ketorolac tromethamine]    Social History:    TOBACCO:  Never used tobacco  ETOH:  Never drank alcohol  DRUGS:  Never used recreational drugs    Family History:       Problem Relation Age of Onset    Asthma Mother     Hypertension Mother     High Blood Pressure Mother     Diabetes Mother     Asthma Brother     High Blood Pressure Father      REVIEW OF SYSTEMS:    Negative other than stated in HPI    PHYSICAL EXAM:      Vitals:    VITALS:  BP (!) 203/130   Pulse 95   Temp 97.3 °F (36.3 °C) (Temporal)   Resp 20   Ht 5' 4\" (1.626 m)   Wt 144 lb (65.3 kg)   SpO2 100%   BMI 24.72 kg/m²   24HR BLOOD PRESSURE RANGE:  Systolic (73HGI), JBE:756 , Min:174 , EJA:286   ; Diastolic (29PVO), YBC:747, Min:105, Max:130    24HR INTAKE/OUTPUT:  No intake or output data in the 24 hours ending 11/11/21 1051    Access: right IJ tunneled dialysis catheter   Constitutional: Awake in no acute distress  HEENT:  Normocephalic, PERRL  Respiratory: Decreased breath sounds on the basis  Cardiovascular/Edema:  RRR, S1/S2  Gastrointestinal:  Soft, PD catheter exit site clean   Neurologic:  Nonfocal, AVELAR  Skin:  Warm, dry, no lesions  Other:  no edema     DATA:    CBC:   Lab Results   Component Value Date    WBC 9.7 11/11/2021    RBC 2.59 11/11/2021    HGB 7.3 11/11/2021    HCT 24.0 11/11/2021    MCV 92.7 11/11/2021    MCH 28.2 11/11/2021    MCHC 30.4 11/11/2021    RDW 17.2 11/11/2021     11/11/2021    MPV 10.9 11/11/2021     CMP:    Lab Results Component Value Date     11/11/2021    K 4.8 11/11/2021    CL 90 11/11/2021    CO2 30 11/11/2021    BUN 30 11/11/2021    CREATININE 6.0 11/11/2021    GFRAA 10 11/11/2021    LABGLOM 10 11/11/2021    GLUCOSE 707 11/11/2021    GLUCOSE 130 05/18/2012    PROT 6.1 11/11/2021    LABALBU 3.4 11/11/2021    LABALBU 4.1 05/18/2012    CALCIUM 8.3 11/11/2021    BILITOT 0.2 11/11/2021    ALKPHOS 163 11/11/2021    AST 7 11/11/2021    ALT 7 11/11/2021     Magnesium:    Lab Results   Component Value Date    MG 1.7 09/28/2021     Phosphorus:    Lab Results   Component Value Date    PHOS 5.9 09/14/2021     Last 3 Troponin:    Lab Results   Component Value Date    TROPONINI 0.12 05/14/2021    TROPONINI 0.22 02/15/2021    TROPONINI 0.13 12/26/2020     U/A:    Lab Results   Component Value Date    COLORU Yellow 09/28/2021    PROTEINU >=300 09/28/2021    PHUR 8.0 09/28/2021    LABCAST RARE 01/12/2020    WBCUA 1-3 09/28/2021    WBCUA 0-1 05/18/2012    RBCUA 0-1 09/28/2021    RBCUA 1-3 08/01/2013    MUCUS Present 02/12/2016    TRICHOMONAS Present 07/17/2020    YEAST RARE 05/24/2018    BACTERIA RARE 09/28/2021    CLARITYU Clear 09/28/2021    SPECGRAV 1.020 09/28/2021    LEUKOCYTESUR Negative 09/28/2021    UROBILINOGEN 0.2 09/28/2021    BILIRUBINUR SMALL 09/28/2021    BILIRUBINUR SMALL 05/18/2012    BLOODU TRACE 09/28/2021    GLUCOSEU 500 09/28/2021    GLUCOSEU >=1000 05/18/2012    AMORPHOUS RARE 11/01/2019     Radiology Review:    Stable interstitial prominence bilaterally notable in the lung bases   suggesting subsegmental atelectasis or scarring or persistent pneumonia. Continued follow-up could be helpful for further evaluation       IMPRESSION/RECOMMENDATIONS:       The patient is a 36 y.o. female with significant past medical history of end stage renal disease secondary to diabetic kidney disease, on home hemodialysis  via Catheter, last hemodialysis treatment on , presents with emesis and blood sugar>500.  She was admitted with diagnosis of DKA and this ocnsultation was requested for management of her ESRD. 1.  ESRD:  Will plan HD today then TTS   Access:  Right IJ  Daily weights  Fluid restriction: none  2. Anemia:  Erythropoetin dose:  Resume retacrit  3. Osteodystrophy:  Phosphate Binder:  Check phos level  4. DKA- agree with fluid bolus  Check beta hydroxybutyrate level and blood sugar after dialysis    Discussed HD RN and Dr Brenna Houser  Thank you for this consultation. Consuelo Self MD

## 2021-11-11 NOTE — ED PROVIDER NOTES
Dorn Alpers is a 36 y.o. female with a PMHx significant for hypertension, hyperlipidemia, ESRD on HD, Insulin-dependent DM, CHF who presents for evaluation of nausea, vomiting, high glucose at home, beginning prior to arrival.  The complaint has been persistent, moderate in severity, and worsened by nothing. The patient states that yesterday she not feel that well, notes she stayed in bed for most of the day. Last time she had a glucose reading was yesterday morning and it was in the 200s. Notes that she did not have much to eat or drink and then yesterday in the evening began to have nausea and vomiting. Note she has generalized abdominal discomfort which is consistent with when she is typically in DKA. She took her glucose this morning and her meter was unable to read it which typically means it is really really high. Patient does note some shortness of breath when she lays flat, but does endorse history of CHF. Patient follows with Dr. Cisco Chacon, nephrology and does dialysis at home on a machine 5 days a week. She notes that she did not do dialysis yesterday. Currently denies any dizziness, lightness, chest pain, urinary symptoms. Follows with Dr. Alfreda Herrera for endocrinology. The history is provided by the patient and medical records. Review of Systems   Constitutional: Positive for fatigue. Negative for chills and fever. HENT: Negative for ear pain, sinus pressure and sore throat. Eyes: Negative for pain, discharge and redness. Respiratory: Negative for cough, shortness of breath and wheezing. Cardiovascular: Negative for chest pain. Gastrointestinal: Positive for anal bleeding, nausea and vomiting. Negative for abdominal distention and diarrhea. Genitourinary: Negative for dysuria and frequency. Musculoskeletal: Negative for arthralgias and back pain. Skin: Negative for rash and wound. Neurological: Negative for weakness and headaches.    Hematological: Negative for adenopathy. All other systems reviewed and are negative. Physical Exam  Vitals and nursing note reviewed. Constitutional:       Appearance: Normal appearance. She is well-developed. She is ill-appearing. She is not toxic-appearing or diaphoretic. HENT:      Head: Normocephalic and atraumatic. Right Ear: External ear normal.      Left Ear: External ear normal.      Mouth/Throat:      Mouth: Mucous membranes are dry. Eyes:      General:         Right eye: No discharge. Left eye: No discharge. Extraocular Movements: Extraocular movements intact. Conjunctiva/sclera: Conjunctivae normal.   Cardiovascular:      Rate and Rhythm: Normal rate and regular rhythm. Heart sounds: Normal heart sounds. No murmur heard. Pulmonary:      Effort: Pulmonary effort is normal. No respiratory distress. Breath sounds: Normal breath sounds. No stridor. Abdominal:      General: There is no distension. Palpations: Abdomen is soft. There is no mass. Tenderness: There is no abdominal tenderness. Hernia: No hernia is present. Musculoskeletal:         General: No deformity. Normal range of motion. Cervical back: Normal range of motion and neck supple. Skin:     General: Skin is warm. Coloration: Skin is not jaundiced or pale. Findings: No rash. Comments: Dialysis catheter noted to the right upper chest   Neurological:      General: No focal deficit present. Mental Status: She is alert and oriented to person, place, and time. Cranial Nerves: No cranial nerve deficit. Coordination: Coordination normal.          Procedures   Peripheral Line Placement Procedure Note  Indication: Poor peripheral access, lack of vascular access  Cosent: The patient provided verbal consent for this procedure. Procedure: The patient was positioned appropriately and the skin over the right arm was prepped with alcohol.  A tourniquet was placed proximal to the vein's location and ultrasound was utilized to identify the vein. A 20 gauge angiocath was inserted into the vein with ultrasound guidance, with the catheter being visualized as it was advanced within the vein. Blood was obtained from the venous access site if needed and the site was secured with a leur lock and a tegaderm adhesive bandage. The patient tolerated the procedure well. Complications: None, blood work was obtained from this access point  Procedure performed by Cale Mota D.O      Central Line Placement Procedure Note   Indication: poor peripheral access, hypovolemia, long term access and need for frequent blood draws  Consent: The patient was counseled regarding the procedure, its indications, risks, potential complications and alternatives, and any questions were answered. Consent was obtained to proceed. Procedure: The patient was positioned appropriately and the skin over the right femoral vein was prepped with betadine and draped in a sterile fashion. Local anesthesia was obtained by infiltration using 1% Lidocaine without epinephrine. A large bore needle was used to identify the vein. A guide wire was then inserted into the vein through the needle. A triple lumen catheter was then inserted into the vessel over the guide wire using the Seldinger technique. All ports showed good, free flowing blood return and were flushed with saline solution. The catheter was then securely fastened to the skin with suture at 20 cm. Two sutures were placed into the proximal eyelets. An antibiotic disk was placed and the site was then covered with a sterile dressing. A post procedure X-ray was not indicated. The patient tolerated the procedure well. Complications: None        FRANCISCO     Wilton Flores presents to the ED for evaluation of fatigue and concern for DKA. The patient notes that she is ESRD on HD five times a week and does dialysis at home on her own machine. POC glucose reading >500.  Staff unable to obtain access, therefore a 20 gauge was placed in the right [facility] the patient moved around and it stopped work. Central line was placed. She was found to be in HHS. , Nephrology was consulted, Dr. Demetrius De Jesus who evaluated the patient at the bedside. Notes that she needs to go to dialysis and he will arrange for it to occur. Case discussed with Dr. Hoa Hernandez with critical care who reccomends starting insulin drip and bolus insulin. Case discussed with hospitalist who willl admit the patient. Orders initially for DKA protocol, the insulin drip rate was changed to 0.05U and nursing staff notified. Patient to go to dialysis then ICU. Patient requires continued workup and management of their symptoms and will be admitted to the hospital for further evaluation and treatment. ED Course as of 11/12/21 1331   Thu Nov 11, 2021   0374 EKG: This EKG is signed and interpreted by me. Rate: 96  Rhythm: Sinus  Interpretation: non-specific EKG, no supervisions, ST depressions, Q waves in V1 V2, , QRS 80, QTc 469, prolonged QTC  Comparison: changes compared to previous EKG    [BB]   1159 Spoke with Dr. Kavita Blank for the hospital service, discussed the patient. He will admit the patient.  [BB]      ED Course User Index  [BB] Yanet Schwartz, DO       --------------------------------------------- PAST HISTORY ---------------------------------------------  Past Medical History:  has a past medical history of Acute congestive heart failure ([facility] [address] BC (acute kidney injury) ([facility] [address] Cardiac arrest ([facility] [address] Cephalgia, Chronic kidney disease, Depression, Diabetes mellitus ([facility] [address] Diabetic gastroparesis associated with type 1 diabetes mellitus ([facility] [address] Diabetic ketoacidosis ([facility] [address] Diabetic ketoacidosis with coma associated with type 1 diabetes mellitus ([facility] [address] Diabetic polyneuropathy [facility] with type [facility] Drug use complicating pregnancy in third trimester, Endocarditis, ESRD (end stage renal disease) ([facility] H/O cardiovascular stress test, Hemodialysis patient ([facility] [address] History of blood transfusion, Hyperlipidemia, Hyperosmolar hyperglycemic state (HHS) ([facility] [address] Hypothyroidism, Iron deficiency anemia, MDRO (multiple drug resistant organisms) resistance, MRSA (methicillin resistant Staphylococcus aureus), Non compliance w medication regimen, Other disorders of kidney and ureter, Pregnancy, Previous  delivery affecting pregnancy, antepartum, Previous stillbirth or demise, antepartum, Seizure ([facility] [address] Severe pre-eclampsia in third trimester, Shock liver, and Valvular endocarditis. Past Surgical History:  has a past surgical history that includes ECHO Compl W Dop Color Flow (2012); ECHO Compl W Dop Color Flow (6/10/2013);  section; Tunneled venous port placement (2018); pr esophagogastroduodenoscopy transoral diagnostic (N/A, 2018); back surgery; Colonoscopy (N/A, 2018); Colonoscopy (N/A, 2018); Upper gastrointestinal endoscopy (2018); Upper gastrointestinal endoscopy (N/A, 10/11/2019); Cholecystectomy, laparoscopic (N/A, 2019); LAPAROSCOPY INSERTION PERITONEAL CATHETER (N/A, 2020); transesophageal echocardiogram (N/A, 10/19/2020); embolectomy (N/A, 2020); Foot Debridement (Left, 2021); Foot Debridement (Left, 2021); Upper gastrointestinal endoscopy (N/A, 2021); and Catheter Removal (N/A, 10/1/2021). Social History:  reports that she quit smoking about 9 months ago. Her smoking use included cigarettes. She has a 3.00 pack-year smoking history. She has never used smokeless tobacco. She reports current drug use. Drug: Opiates . She reports that she does not drink alcohol. Family History: family history includes Asthma in her brother and mother; Diabetes in her mother; High Blood Pressure in her father and mother; Hypertension in her mother. The patients home medications have been reviewed.     Allergies: Cefepime and Toradol [ketorolac tromethamine]    -------------------------------------------------- RESULTS -------------------------------------------------    Lab  Results for orders placed or performed during the hospital encounter of 11/11/21   CBC Auto Differential   Result Value Ref Range    WBC 9.7 4.5 - 11.5 E9/L    RBC 2.59 (L) 3.50 - 5.50 E12/L    Hemoglobin 7.3 (L) 11.5 - 15.5 g/dL    Hematocrit 24.0 (L) 34.0 - 48.0 %    MCV 92.7 80.0 - 99.9 fL    MCH 28.2 26.0 - 35.0 pg    MCHC 30.4 (L) 32.0 - 34.5 %    RDW 17.2 (H) 11.5 - 15.0 fL    Platelets 580 019 - 186 E9/L    MPV 10.9 7.0 - 12.0 fL    Neutrophils % 67.7 43.0 - 80.0 %    Immature Granulocytes % 0.3 0.0 - 5.0 %    Lymphocytes % 23.3 20.0 - 42.0 %    Monocytes % 2.7 2.0 - 12.0 %    Eosinophils % 4.4 0.0 - 6.0 %    Basophils % 1.6 0.0 - 2.0 %    Neutrophils Absolute 6.58 1.80 - 7.30 E9/L    Immature Granulocytes # 0.03 E9/L    Lymphocytes Absolute 2.26 1.50 - 4.00 E9/L    Monocytes Absolute 0.26 0.10 - 0.95 E9/L    Eosinophils Absolute 0.43 0.05 - 0.50 E9/L    Basophils Absolute 0.16 0.00 - 0.20 E9/L   Comprehensive Metabolic Panel w/ Reflex to MG   Result Value Ref Range    Sodium 132 132 - 146 mmol/L    Potassium reflex Magnesium 4.8 3.5 - 5.0 mmol/L    Chloride 90 (L) 98 - 107 mmol/L    CO2 30 (H) 22 - 29 mmol/L    Anion Gap 12 7 - 16 mmol/L    Glucose 707 (HH) 74 - 99 mg/dL    BUN 30 (H) 6 - 20 mg/dL    CREATININE 6.0 (H) 0.5 - 1.0 mg/dL    GFR Non-African American 10 >=60 mL/min/1.73    GFR African American 10     Calcium 8.3 (L) 8.6 - 10.2 mg/dL    Total Protein 6.1 (L) 6.4 - 8.3 g/dL    Albumin 3.4 (L) 3.5 - 5.2 g/dL    Total Bilirubin 0.2 0.0 - 1.2 mg/dL    Alkaline Phosphatase 163 (H) 35 - 104 U/L    ALT 7 0 - 32 U/L    AST 7 0 - 31 U/L   Lactic Acid, Plasma   Result Value Ref Range    Lactic Acid 2.1 0.5 - 2.2 mmol/L   Brain Natriuretic Peptide   Result Value Ref Range    Pro-BNP >70,000 (H) 0 - 125 pg/mL   Troponin   Result Value Ref Range    Troponin, High Sensitivity 318 (H) 0 - 9 ng/L   Iron and TIBC   Result Value Ref Range    Iron 48 37 - 145 mcg/dL    TIBC 138 (L) 250 - 450 mcg/dL    Iron Saturation 35 15 - 50 %   Beta-Hydroxybutyrate   Result Value Ref Range    Beta-Hydroxybutyrate 0.20 0.02 - 0.27 mmol/L   Basic metabolic panel   Result Value Ref Range    Sodium 136 132 - 146 mmol/L    Potassium 3.2 (L) 3.5 - 5.0 mmol/L    Chloride 95 (L) 98 - 107 mmol/L    CO2 32 (H) 22 - 29 mmol/L    Anion Gap 9 7 - 16 mmol/L    Glucose 93 74 - 99 mg/dL    BUN 7 6 - 20 mg/dL    CREATININE 1.9 (H) 0.5 - 1.0 mg/dL    GFR Non-African American 38 >=60 mL/min/1.73    GFR African American 38     Calcium 7.5 (L) 8.6 - 10.2 mg/dL   Magnesium   Result Value Ref Range    Magnesium 1.5 (L) 1.6 - 2.6 mg/dL   Phosphorus   Result Value Ref Range    Phosphorus 1.3 (L) 2.5 - 4.5 mg/dL   CBC auto differential   Result Value Ref Range    WBC 10.7 4.5 - 11.5 E9/L    RBC 2.69 (L) 3.50 - 5.50 E12/L    Hemoglobin 7.6 (L) 11.5 - 15.5 g/dL    Hematocrit 23.3 (L) 34.0 - 48.0 %    MCV 86.6 80.0 - 99.9 fL    MCH 28.3 26.0 - 35.0 pg    MCHC 32.6 32.0 - 34.5 %    RDW 16.7 (H) 11.5 - 15.0 fL    Platelets 621 718 - 002 E9/L    MPV 10.6 7.0 - 12.0 fL    Neutrophils % 63.7 43.0 - 80.0 %    Immature Granulocytes % 0.3 0.0 - 5.0 %    Lymphocytes % 23.5 20.0 - 42.0 %    Monocytes % 2.5 2.0 - 12.0 %    Eosinophils % 8.7 (H) 0.0 - 6.0 %    Basophils % 1.3 0.0 - 2.0 %    Neutrophils Absolute 6.82 1.80 - 7.30 E9/L    Immature Granulocytes # 0.03 E9/L    Lymphocytes Absolute 2.52 1.50 - 4.00 E9/L    Monocytes Absolute 0.27 0.10 - 0.95 E9/L    Eosinophils Absolute 0.93 (H) 0.05 - 0.50 E9/L    Basophils Absolute 0.14 0.00 - 0.20 G2/Q   Basic metabolic panel   Result Value Ref Range    Sodium 135 132 - 146 mmol/L    Potassium 3.6 3.5 - 5.0 mmol/L    Chloride 96 (L) 98 - 107 mmol/L    CO2 31 (H) 22 - 29 mmol/L    Anion Gap 8 7 - 16 mmol/L    Glucose 109 (H) 74 - 99 mg/dL    BUN 8 6 - 20 mg/dL    CREATININE 2.7 (H) 0.5 - 1.0 mg/dL GFR Non-African American 25 >=60 mL/min/1.73    GFR African American 25     Calcium 7.8 (L) 8.6 - 10.2 mg/dL   Magnesium   Result Value Ref Range    Magnesium 1.6 1.6 - 2.6 mg/dL   Phosphorus   Result Value Ref Range    Phosphorus 1.8 (L) 2.5 - 4.5 mg/dL   TSH without Reflex   Result Value Ref Range    TSH 6.990 (H) 0.270 - 4.200 uIU/mL   POCT Glucose   Result Value Ref Range    QC OK? read HI.  aware    POCT Glucose   Result Value Ref Range    Meter Glucose >500 (H) 74 - 99 mg/dL   POCT Glucose   Result Value Ref Range    Glucose 190 mg/dL    QC OK? yes    POCT glucose   Result Value Ref Range    Glucose 90 mg/dL    QC OK? yes    POCT Glucose   Result Value Ref Range    Meter Glucose 374 (H) 74 - 99 mg/dL   POCT Glucose   Result Value Ref Range    Meter Glucose 190 (H) 74 - 99 mg/dL   POCT Glucose   Result Value Ref Range    Meter Glucose 90 74 - 99 mg/dL   POCT Glucose   Result Value Ref Range    Meter Glucose 77 74 - 99 mg/dL   POCT Glucose   Result Value Ref Range    Meter Glucose 93 74 - 99 mg/dL   POCT Glucose   Result Value Ref Range    Meter Glucose 79 74 - 99 mg/dL   POCT Glucose   Result Value Ref Range    Meter Glucose 69 (L) 74 - 99 mg/dL   POCT Glucose   Result Value Ref Range    Meter Glucose 125 (H) 74 - 99 mg/dL   POCT Glucose   Result Value Ref Range    Meter Glucose 128 (H) 74 - 99 mg/dL   POCT Glucose   Result Value Ref Range    Meter Glucose 84 74 - 99 mg/dL   EKG 12 Lead   Result Value Ref Range    Ventricular Rate 96 BPM    Atrial Rate 96 BPM    P-R Interval 124 ms    QRS Duration 80 ms    Q-T Interval 372 ms    QTc Calculation (Bazett) 469 ms    P Axis 37 degrees    R Axis 56 degrees    T Axis 39 degrees       Radiology  XR CHEST PORTABLE   Final Result   Stable interstitial prominence bilaterally notable in the lung bases   suggesting subsegmental atelectasis or scarring or persistent pneumonia. Continued follow-up could be helpful for further evaluation. ------------------------- NURSING NOTES AND VITALS REVIEWED ---------------------------  Date / Time Roomed:  11/11/2021  8:32 AM  ED Bed Assignment:  [id number]    The nursing notes within the ED encounter and vital signs as below have been reviewed. Patient Vitals for the past 24 hrs:   BP Temp Temp src Pulse Resp SpO2   11/12/21 1200  97.4 °F (36.3 °C) Axillary      11/12/21 0800  97.9 °F (36.6 °C) Axillary      11/12/21 0600 (!) 198/128   88 13 100 %   11/12/21 0500 (!) 175/95   90 (!) 3 100 %   11/12/21 0400 (!) 156/95   90 (!) 4 100 %   11/12/21 0300 (!) 167/97   88 (!) 4 100 %   11/12/21 0200 (!) 152/106   97 15 100 %   11/12/21 0100 (!) 175/116   91 16 100 %   11/12/21 0000 (!) 210/133 98.1 °F (36.7 °C) Oral 101 19 100 %   11/11/21 2300 (!) 208/117   99 21 100 %   11/11/21 2200 (!) 191/109   96 14 100 %   11/11/21 2100 (!) 178/99   94 23 100 %   11/11/21 2000 (!) 168/119 98 °F (36.7 °C) Oral 96 20 100 %   11/11/21 1800 (!) 162/103   91 14 99 %   11/11/21 1725 (!) 205/113 97.6 °F (36.4 °C) Axillary 91 15 100 %   11/11/21 1703 (!) 201/117 97.3 °F (36.3 °C)  90 16    11/11/21 1631 (!) 205/112   89     11/11/21 1600 (!) 186/114   87     11/11/21 1530 (!) 183/111   88     11/11/21 1500 (!) 176/111   86     11/11/21 1430 (!) 172/110   86     11/11/21 1400 (!) 179/104   86     11/11/21 1345 (!) 176/113   87         Oxygen Saturation Interpretation: Normal      ------------------------------------------ PROGRESS NOTES ------------------------------------------  Re-evaluation(s):  Time: 0833. Patient re-evaluated. Laying in bed in no distress. Discussed results and treatment plan. Time: 1300. Patient re-evaluated. Continues to have discomfort. Fentanyl ordered at this time as well as Tigan.     I have spoken with the patient and discussed todays results, in addition to providing specific details for the plan of care and counseling regarding the diagnosis and prognosis. Their questions are answered at this time and they are agreeable with the plan.      --------------------------------- ADDITIONAL PROVIDER NOTES ---------------------------------  Consultations:  Spoke with Dr. Bernadette Osorio,  They will provide consultation. Spoke with Dr. Kaylen Maier he will accept the patient to the ICU and provide consultation. Spoke with Dr. Agustin Potter, discussed the patient he will admit the patient. This patient's ED course included: a personal history and physicial examination, re-evaluation prior to disposition, multiple bedside re-evaluations, IV medications, cardiac monitoring, continuous pulse oximetry and complex medical decision making and emergency management    This patient has remained hemodynamically stable and been closely monitored during their ED course. Please note that the withdrawal or failure to initiate urgent interventions for this patient would likely result in a life threatening deterioration or permanent disability. Accordingly this patient received 30 minutes of critical care time, excluding separately billable procedures. Clinical Impression  1. 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) ([facility] [address]    2. ESRD (end stage renal disease) on dialysis [facility])          Disposition  Patient's disposition: Admit to CCU/ICU  Patient's condition is stable.        Clary Feng DO  11/12/21 0368

## 2021-11-11 NOTE — H&P
[address] [place], [place] [address]ist Group   History and Physical      CHIEF COMPLAINT: Fatigue, body aches    History of Present Illness:  36 y.o. female with a history of uncontrolled type 1 diabetes mellitus, end-stage renal disease on home hemodialysis previously on peritoneal dialysis, diastolic heart failure presents with fatigue and body aches. At home her blood glucose was read as high on glucometer. Denies any missed hemodialysis regimen or missed insulin doses. She has nausea but no vomiting. Denies any chest pain, shortness of breath, dizziness, lightheadedness, fever, chills, or known sick contacts. In the ED, vital signs significant for blood pressure 203/130. Labs significant for blood glucose of 707 BMP, normal beta hydroxybutyrate and anion gap, proBNP greater than 70,000. She was started on insulin drip for HHS. Informant(s) for H&P: Patient, ED physician, chart review    REVIEW OF SYSTEMS:  no fevers, chills, cp, sob, n/v, ha, vision/hearing changes, wt changes, hot/cold flashes, other open skin lesions, diarrhea, constipation, dysuria/hematuria unless noted in HPI. Complete ROS performed with the patient and is otherwise negative.       PMH:  Past Medical History:   Diagnosis Date    Acute congestive heart failure ([address]     BC (acute kidney injury) ([address] 10/01/2019    Cardiac arrest ([address] 02/15/2021    Cephalgia 10/09/2019    Chronic kidney disease     Depression     Diabetes mellitus ([address]     Diabetic gastroparesis associated with type 1 diabetes mellitus ([address] 12/17/2018    Diabetic ketoacidosis ([address] 08/27/2011    Diabetic ketoacidosis with coma associated with type 1 diabetes mellitus ([address] 06/26/2013    Diabetic polyneuropathy associated with type 1 diabetes mellitus ([address] 12/27/2020    Drug use complicating pregnancy in third trimester     Endocarditis 10/31/2020    ESRD (end stage renal disease) ([address] 09/29/2020    H/O cardiovascular stress test 2021    Lexiscan    [facility]     History of blood transfusion 2019    Hyperlipidemia 10/08/2020    Hyperosmolar hyperglycemic state (HHS) ([facility] 2020    Hypothyroidism 10/08/2020    Iron deficiency anemia 10/01/2019    MDRO (multiple drug resistant organisms) resistance     MRSA (methicillin resistant Staphylococcus aureus)     back wound abcess    Non compliance w medication regimen 2016    Other disorders of kidney and ureter     Pregnancy 2016    16 weeks    Previous  delivery affecting pregnancy, antepartum 2017    Previous stillbirth or demise, antepartum 2016    Seizure ([facility] 2020    Severe pre-eclampsia in third trimester 2016    Shock liver 02/15/2021    Valvular endocarditis 11/10/2020    This Diagnosis was added to the Problem List based on transcribed orders from Dr. Mounika Darling          Surgical History:  Past Surgical History:   Procedure Laterality Date    BACK SURGERY      abscess    CATHETER REMOVAL N/A 10/1/2021    PERITONEAL CATHETER REMOVAL performed by Anh Esquivel MD at [facility]      x2   [facility], LAPAROSCOPIC N/A 2019    CHOLECYSTECTOMY LAPAROSCOPIC performed by Celine Singh MD at [place] [address] N/A 2018    COLONOSCOPY WITH BIOPSY performed by Junior Murillo MD at [address] N/A 2018    COLONOSCOPY WITH BIOPSY performed by Kaylee Mojica MD at [address] [place] [address] ECHO COMPL W [address]  2012    EF 57%    ECHO COMPL W DOP COLOR FLOW  6/10/2013         EMBOLECTOMY N/A 2020    ANGIOVAC, VEGECTOMY, YULISSA -- REQS ROOM 3 performed by Kenzie Avila MD at [facility] Left 2021    LEFT LEG INCISION AND DRAINAGE, DEBRIDEMENT, WOUND VAC APPLICATION performed by Kiera Mims DPM at [address] Left 2021    LEFT LEG DEBRIDEMENT BIOPSY POSS APPLICATION WOUND VAC performed by Costa Marlow DPM at [facility] 20 N/A 6/26/2020    LAPAROSCOPIC INSERTION PERITONEAL DIALYSIS CATHETER performed by Yuliya Joseph MD at [facility] ESOPHAGOGASTRODUODENOSCOPY TRANSORAL DIAGNOSTIC N/A 5/30/2018    EGD ESOPHAGOGASTRODUODENOSCOPY performed by Yung Soriano MD at [address] [facility] TRANSESOPHAGEAL ECHOCARDIOGRAM N/A 10/19/2020    TRANSESOPHAGEAL ECHOCARDIOGRAM WITH BUBBLE STUDY performed by Elizabeth Ponce MD at [address] [facility] TUNNELED VENOUS PORT PLACEMENT  04/2018    UPPER GASTROINTESTINAL ENDOSCOPY  12/18/2018    EGD BIOPSY performed by Jaycob Knight MD at [address] 10/11/2019    EGD ESOPHAGOGASTRODUODENOSCOPY performed by Yaa Arrieta DO at [address] N/A 7/14/2021    EGD BIOPSY performed by John Mittal MD at [place] [address]       Medications Prior to Admission:    Prior to Admission medications    Medication Sig Start Date End Date Taking? Authorizing Provider   bumetanide (BUMEX) 2 MG tablet Take 2 mg by mouth 2 times daily    Historical Provider, MD   pantoprazole (PROTONIX) 40 MG tablet Take 40 mg by mouth daily as needed (Reflux)    Historical Provider, MD   insulin glargine (LANTUS) 100 UNIT/ML injection vial Inject 10 Units into the skin 2 times daily 9/15/21   Brodie Pulido MD   gentamicin (GARAMYCIN) 0.1 % cream Apply topically 3 times daily.  9/15/21   Brodie Pulido MD   vitamin D (ERGOCALCIFEROL) 1.25 MG (93687 UT) CAPS capsule Take 1 capsule by mouth once a week 9/3/21   Car Herrera MD   metoprolol tartrate (LOPRESSOR) 25 MG tablet Take 25 mg by mouth 2 times daily    Historical Provider, MD   sevelamer (RENVELA) 800 MG tablet Take 1 tablet by mouth 3 times daily (with meals) New lower dose 7/2/21   Car Herrera MD   insulin lispro, 1 Unit Dial, (HUMALOG KWIKPEN) 100 UNIT/ML SOPN Inject 3 units with meals + sliding scale. MAX 30U/day 6/1/21   Khadra Junior MD   mirtazapine (REMERON) 15 MG tablet Take 15 mg by mouth nightly    Historical Provider, MD   polyethylene glycol (GLYCOLAX) 17 g packet Take 17 g by mouth daily as needed for Constipation    Historical Provider, MD   ARIPiprazole (ABILIFY) 5 MG tablet Take 5 mg by mouth nightly 4/18/21   Historical Provider, MD   levothyroxine (SYNTHROID) 75 MCG tablet TAKE 1 TABLET BY MOUTH IN THE MORNING BEFORE BREAKFAST 4/19/21   Jacinda Littlejohn DO   rOPINIRole (REQUIP) 0.25 MG tablet Take 0.25 mg by mouth nightly    Historical Provider, MD       Allergies:    Cefepime and Toradol [ketorolac tromethamine]    Social History:    reports that she quit smoking about 9 months ago. Her smoking use included cigarettes. She has a 3.00 pack-year smoking history. She has never used smokeless tobacco. She reports current drug use. Drug: Opiates . She reports that she does not drink alcohol. Family History:   family history includes Asthma in her brother and mother; Diabetes in her mother; High Blood Pressure in her father and mother; Hypertension in her mother. PHYSICAL EXAM:  Vitals:  BP (!) 203/130   Pulse 95   Temp 97.3 °F (36.3 °C) (Temporal)   Resp 20   Ht 5' 4\" (1.626 m)   Wt 144 lb (65.3 kg)   SpO2 100%   BMI 24.72 kg/m²     General Appearance: alert and oriented to person, place and time and in no acute distress  Skin: warm and dry. Hyperpigmentation and healed wound on the left distal lower extremity  Head: normocephalic and atraumatic  Eyes: pupils equal, round, and reactive to light, extraocular eye movements intact, conjunctivae normal  Neck: neck supple and non tender without mass   Pulmonary/Chest: Nonlabored on 3 L nasal cannula. Clear to auscultation bilaterally.   Cardiovascular: normal rate, normal S1 and S2 and no carotid bruits  Abdomen: soft, non-tender, moderately distended, normal bowel sounds, no masses or organomegaly  Extremities: no cyanosis, no clubbing and no edema  Neurologic: no cranial nerve deficit and speech normal    LABS:  Recent Labs     11/11/21  0953      K 4.8   CL 90*   CO2 30*   BUN 30*   CREATININE 6.0*   GLUCOSE 707*   CALCIUM 8.3*       Recent Labs     11/11/21  0953   WBC 9.7   RBC 2.59*   HGB 7.3*   HCT 24.0*   MCV 92.7   MCH 28.2   MCHC 30.4*   RDW 17.2*      MPV 10.9       No results for input(s): POCGLU in the last 72 hours. Radiology: XR CHEST PORTABLE    Result Date: 11/11/2021  EXAMINATION: ONE XRAY VIEW OF THE CHEST 11/11/2021 9:24 am COMPARISON: September 28, 2021 HISTORY: ORDERING SYSTEM PROVIDED HISTORY: shorntess of breath TECHNOLOGIST PROVIDED HISTORY: Reason for exam:->shorntess of breath FINDINGS: Right-sided central venous catheter is present with distal tip of location of right atrial/caval junction or right atrium. Interstitial prominence bilaterally notable in the lung bases and perihilar locations. No evidence of pleural effusion. No pneumothorax. The heart is normal in size. Stable interstitial prominence bilaterally notable in the lung bases suggesting subsegmental atelectasis or scarring or persistent pneumonia. Continued follow-up could be helpful for further evaluation. EKG: Sinus rhythm without acute ischemic changes    ASSESSMENT/PLAN:      Principal Problem:    Hyperosmolar hyperglycemic state (HHS) ([facility]  Active Problems:    Uncontrolled type 1 diabetes mellitus with hyperglycemia (HCC)    End stage renal disease ([facility]    Hypertensive urgency    Hypothyroidism    Major depressive disorder  Resolved Problems:    * No resolved hospital problems. *      1. Hyperosmolar hyperglycemic state  -Given 15 units IV push insulin regular in the ED  -Start insulin drip at HHS rate of 0.05 units/kg/h  -Blood glucose every hour  -BMP, magnesium, phosphorus every 4 hours    2.   Hypertensive urgency  -Likely intravascular volume overloaded  -Nephrology was consulted in the ED, and they will dialyze her today    3. Uncontrolled type 1 diabetes mellitus  -Insulin drip for now, then transition back to subcutaneous insulin regimen    4. End-stage renal disease  -Nephrology following for dialysis management  -Continue home Bumex as she does make some urine    5. Hypothyroidism  -Continue home levothyroxine    6. Major depressive disorder  -Continue home Abilify    Code Status: Full code  DVT prophylaxis: Subcutaneous heparin    NOTE: This report was transcribed using voice recognition software.  Every effort was made to ensure accuracy; however, inadvertent computerized transcription errors may be present.     Electronically signed by Heber Dias DO on 11/11/2021 at 1:28 PM

## 2021-11-11 NOTE — ED NOTES
IV infiltrated after fluids were started. IV removed. Dr cortez.      Amilcar Menezes, RN  11/11/21 5733

## 2021-11-12 LAB
ANION GAP SERPL CALCULATED.3IONS-SCNC: 8 MMOL/L (ref 7–16)
BASOPHILS ABSOLUTE: 0.14 E9/L (ref 0–0.2)
BASOPHILS RELATIVE PERCENT: 1.3 % (ref 0–2)
BUN BLDV-MCNC: 8 MG/DL (ref 6–20)
CALCIUM SERPL-MCNC: 7.8 MG/DL (ref 8.6–10.2)
CHLORIDE BLD-SCNC: 96 MMOL/L (ref 98–107)
CO2: 31 MMOL/L (ref 22–29)
CREAT SERPL-MCNC: 2.7 MG/DL (ref 0.5–1)
EKG ATRIAL RATE: 96 BPM
EKG P AXIS: 37 DEGREES
EKG P-R INTERVAL: 124 MS
EKG Q-T INTERVAL: 372 MS
EKG QRS DURATION: 80 MS
EKG QTC CALCULATION (BAZETT): 469 MS
EKG R AXIS: 56 DEGREES
EKG T AXIS: 39 DEGREES
EKG VENTRICULAR RATE: 96 BPM
EOSINOPHILS ABSOLUTE: 0.93 E9/L (ref 0.05–0.5)
EOSINOPHILS RELATIVE PERCENT: 8.7 % (ref 0–6)
GFR AFRICAN AMERICAN: 25
GFR NON-AFRICAN AMERICAN: 25 ML/MIN/1.73
GLUCOSE BLD-MCNC: 109 MG/DL (ref 74–99)
HCT VFR BLD CALC: 23.3 % (ref 34–48)
HEMOGLOBIN: 7.6 G/DL (ref 11.5–15.5)
IMMATURE GRANULOCYTES #: 0.03 E9/L
IMMATURE GRANULOCYTES %: 0.3 % (ref 0–5)
IRON SATURATION: 35 % (ref 15–50)
IRON: 48 MCG/DL (ref 37–145)
LYMPHOCYTES ABSOLUTE: 2.52 E9/L (ref 1.5–4)
LYMPHOCYTES RELATIVE PERCENT: 23.5 % (ref 20–42)
MAGNESIUM: 1.6 MG/DL (ref 1.6–2.6)
MCH RBC QN AUTO: 28.3 PG (ref 26–35)
MCHC RBC AUTO-ENTMCNC: 32.6 % (ref 32–34.5)
MCV RBC AUTO: 86.6 FL (ref 80–99.9)
METER GLUCOSE: 125 MG/DL (ref 74–99)
METER GLUCOSE: 128 MG/DL (ref 74–99)
METER GLUCOSE: 178 MG/DL (ref 74–99)
METER GLUCOSE: 45 MG/DL (ref 74–99)
METER GLUCOSE: 49 MG/DL (ref 74–99)
METER GLUCOSE: 84 MG/DL (ref 74–99)
METER GLUCOSE: 96 MG/DL (ref 74–99)
MONOCYTES ABSOLUTE: 0.27 E9/L (ref 0.1–0.95)
MONOCYTES RELATIVE PERCENT: 2.5 % (ref 2–12)
NEUTROPHILS ABSOLUTE: 6.82 E9/L (ref 1.8–7.3)
NEUTROPHILS RELATIVE PERCENT: 63.7 % (ref 43–80)
PDW BLD-RTO: 16.7 FL (ref 11.5–15)
PHOSPHORUS: 1.8 MG/DL (ref 2.5–4.5)
PLATELET # BLD: 181 E9/L (ref 130–450)
PMV BLD AUTO: 10.6 FL (ref 7–12)
POTASSIUM SERPL-SCNC: 3.6 MMOL/L (ref 3.5–5)
RBC # BLD: 2.69 E12/L (ref 3.5–5.5)
SARS-COV-2, NAAT: NOT DETECTED
SODIUM BLD-SCNC: 135 MMOL/L (ref 132–146)
TOTAL IRON BINDING CAPACITY: 138 MCG/DL (ref 250–450)
TSH SERPL DL<=0.05 MIU/L-ACNC: 6.99 UIU/ML (ref 0.27–4.2)
WBC # BLD: 10.7 E9/L (ref 4.5–11.5)

## 2021-11-12 PROCEDURE — 84100 ASSAY OF PHOSPHORUS: CPT

## 2021-11-12 PROCEDURE — 84443 ASSAY THYROID STIM HORMONE: CPT

## 2021-11-12 PROCEDURE — 36415 COLL VENOUS BLD VENIPUNCTURE: CPT

## 2021-11-12 PROCEDURE — 6360000002 HC RX W HCPCS: Performed by: INTERNAL MEDICINE

## 2021-11-12 PROCEDURE — 85025 COMPLETE CBC W/AUTO DIFF WBC: CPT

## 2021-11-12 PROCEDURE — 6370000000 HC RX 637 (ALT 250 FOR IP): Performed by: INTERNAL MEDICINE

## 2021-11-12 PROCEDURE — 2580000003 HC RX 258: Performed by: INTERNAL MEDICINE

## 2021-11-12 PROCEDURE — 87635 SARS-COV-2 COVID-19 AMP PRB: CPT

## 2021-11-12 PROCEDURE — 82962 GLUCOSE BLOOD TEST: CPT

## 2021-11-12 PROCEDURE — 2500000003 HC RX 250 WO HCPCS: Performed by: NURSE PRACTITIONER

## 2021-11-12 PROCEDURE — 2060000000 HC ICU INTERMEDIATE R&B

## 2021-11-12 PROCEDURE — 36592 COLLECT BLOOD FROM PICC: CPT

## 2021-11-12 PROCEDURE — 80048 BASIC METABOLIC PNL TOTAL CA: CPT

## 2021-11-12 PROCEDURE — 83735 ASSAY OF MAGNESIUM: CPT

## 2021-11-12 PROCEDURE — 99232 SBSQ HOSP IP/OBS MODERATE 35: CPT | Performed by: INTERNAL MEDICINE

## 2021-11-12 RX ORDER — METOPROLOL TARTRATE 50 MG/1
50 TABLET, FILM COATED ORAL 2 TIMES DAILY
Status: DISCONTINUED | OUTPATIENT
Start: 2021-11-12 | End: 2021-11-15 | Stop reason: HOSPADM

## 2021-11-12 RX ORDER — AMLODIPINE BESYLATE 5 MG/1
5 TABLET ORAL DAILY
Status: DISCONTINUED | OUTPATIENT
Start: 2021-11-12 | End: 2021-11-15 | Stop reason: HOSPADM

## 2021-11-12 RX ORDER — LOPERAMIDE HYDROCHLORIDE 2 MG/1
2 CAPSULE ORAL DAILY PRN
Status: DISCONTINUED | OUTPATIENT
Start: 2021-11-12 | End: 2021-11-15 | Stop reason: HOSPADM

## 2021-11-12 RX ORDER — FENTANYL CITRATE 50 UG/ML
25 INJECTION, SOLUTION INTRAMUSCULAR; INTRAVENOUS
Status: DISCONTINUED | OUTPATIENT
Start: 2021-11-12 | End: 2021-11-13

## 2021-11-12 RX ADMIN — INSULIN GLARGINE 10 UNITS: 100 INJECTION, SOLUTION SUBCUTANEOUS at 08:25

## 2021-11-12 RX ADMIN — Medication 10 ML: at 20:24

## 2021-11-12 RX ADMIN — DEXTROSE MONOHYDRATE 100 ML/HR: 50 INJECTION, SOLUTION INTRAVENOUS at 23:58

## 2021-11-12 RX ADMIN — FENTANYL CITRATE 25 MCG: 50 INJECTION, SOLUTION INTRAMUSCULAR; INTRAVENOUS at 05:14

## 2021-11-12 RX ADMIN — SEVELAMER CARBONATE 800 MG: 800 TABLET, FILM COATED ORAL at 12:14

## 2021-11-12 RX ADMIN — ROPINIROLE HYDROCHLORIDE 0.25 MG: 0.25 TABLET, FILM COATED ORAL at 20:22

## 2021-11-12 RX ADMIN — HYDRALAZINE HYDROCHLORIDE 10 MG: 20 INJECTION INTRAMUSCULAR; INTRAVENOUS at 12:15

## 2021-11-12 RX ADMIN — AMLODIPINE BESYLATE 5 MG: 5 TABLET ORAL at 12:14

## 2021-11-12 RX ADMIN — HEPARIN SODIUM 5000 UNITS: 5000 INJECTION INTRAVENOUS; SUBCUTANEOUS at 06:57

## 2021-11-12 RX ADMIN — HEPARIN SODIUM 5000 UNITS: 5000 INJECTION INTRAVENOUS; SUBCUTANEOUS at 21:26

## 2021-11-12 RX ADMIN — BUMETANIDE 2 MG: 1 TABLET ORAL at 20:22

## 2021-11-12 RX ADMIN — INSULIN LISPRO 1 UNITS: 100 INJECTION, SOLUTION INTRAVENOUS; SUBCUTANEOUS at 16:27

## 2021-11-12 RX ADMIN — HYDRALAZINE HYDROCHLORIDE 10 MG: 20 INJECTION INTRAMUSCULAR; INTRAVENOUS at 21:27

## 2021-11-12 RX ADMIN — FENTANYL CITRATE 25 MCG: 50 INJECTION, SOLUTION INTRAMUSCULAR; INTRAVENOUS at 12:15

## 2021-11-12 RX ADMIN — ARIPIPRAZOLE 5 MG: 5 TABLET ORAL at 20:22

## 2021-11-12 RX ADMIN — FENTANYL CITRATE 25 MCG: 50 INJECTION, SOLUTION INTRAMUSCULAR; INTRAVENOUS at 14:16

## 2021-11-12 RX ADMIN — MIRTAZAPINE 15 MG: 15 TABLET, FILM COATED ORAL at 20:22

## 2021-11-12 RX ADMIN — FENTANYL CITRATE 25 MCG: 50 INJECTION, SOLUTION INTRAMUSCULAR; INTRAVENOUS at 20:22

## 2021-11-12 RX ADMIN — LOPERAMIDE HYDROCHLORIDE 2 MG: 2 CAPSULE ORAL at 15:49

## 2021-11-12 RX ADMIN — METOPROLOL TARTRATE 50 MG: 50 TABLET, FILM COATED ORAL at 20:22

## 2021-11-12 RX ADMIN — BUMETANIDE 2 MG: 1 TABLET ORAL at 08:25

## 2021-11-12 RX ADMIN — METOPROLOL TARTRATE 25 MG: 25 TABLET, FILM COATED ORAL at 08:25

## 2021-11-12 RX ADMIN — Medication 40 ML: at 08:25

## 2021-11-12 RX ADMIN — FENTANYL CITRATE 25 MCG: 50 INJECTION, SOLUTION INTRAMUSCULAR; INTRAVENOUS at 16:25

## 2021-11-12 RX ADMIN — HEPARIN SODIUM 5000 UNITS: 5000 INJECTION INTRAVENOUS; SUBCUTANEOUS at 13:49

## 2021-11-12 RX ADMIN — DEXTROSE MONOHYDRATE 25 G: 25 INJECTION, SOLUTION INTRAVENOUS at 23:54

## 2021-11-12 RX ADMIN — FENTANYL CITRATE 25 MCG: 50 INJECTION, SOLUTION INTRAMUSCULAR; INTRAVENOUS at 08:25

## 2021-11-12 RX ADMIN — LEVOTHYROXINE SODIUM 75 MCG: 0.07 TABLET ORAL at 06:58

## 2021-11-12 RX ADMIN — SEVELAMER CARBONATE 800 MG: 800 TABLET, FILM COATED ORAL at 08:25

## 2021-11-12 RX ADMIN — SEVELAMER CARBONATE 800 MG: 800 TABLET, FILM COATED ORAL at 16:26

## 2021-11-12 ASSESSMENT — PAIN SCALES - GENERAL
PAINLEVEL_OUTOF10: 0
PAINLEVEL_OUTOF10: 7
PAINLEVEL_OUTOF10: 8
PAINLEVEL_OUTOF10: 0
PAINLEVEL_OUTOF10: 7
PAINLEVEL_OUTOF10: 8
PAINLEVEL_OUTOF10: 6
PAINLEVEL_OUTOF10: 8
PAINLEVEL_OUTOF10: 6
PAINLEVEL_OUTOF10: 7
PAINLEVEL_OUTOF10: 0
PAINLEVEL_OUTOF10: 0
PAINLEVEL_OUTOF10: 6
PAINLEVEL_OUTOF10: 8
PAINLEVEL_OUTOF10: 0
PAINLEVEL_OUTOF10: 7
PAINLEVEL_OUTOF10: 7
PAINLEVEL_OUTOF10: 0
PAINLEVEL_OUTOF10: 8
PAINLEVEL_OUTOF10: 7

## 2021-11-12 ASSESSMENT — PAIN DESCRIPTION - LOCATION
LOCATION: GENERALIZED

## 2021-11-12 ASSESSMENT — PAIN DESCRIPTION - PAIN TYPE
TYPE: CHRONIC PAIN

## 2021-11-12 NOTE — PROGRESS NOTES
[address] [place], [place] [address]ist   Progress Note    Admitting Date and Time: 11/11/2021  8:32 AM  Admit Dx: Hyperosmolar hyperglycemic state (HHS) ([facility] [E11.00, E11.65]    Subjective/interval history:    Pt admitted yesterday evening with HHS. Patient received IV insulin bolus and was started on drip in the ED, and her blood glucose quickly dropped. Insulin drip was discontinued and she is now on D5 half-normal at 40 mL/h. Given her morning dose of Lantus to prevent DKA. At this time she is complaining of pain with her dialysis catheter that has been going on for several weeks. ROS: denies fever, chills, cp, sob, n/v, HA unless stated above.      epoetin nena-epbx  10,000 Units SubCUTAneous Once per day on Mon Wed Fri    ARIPiprazole  5 mg Oral Nightly    bumetanide  2 mg Oral BID    insulin glargine  10 Units SubCUTAneous BID    levothyroxine  75 mcg Oral Daily    metoprolol tartrate  25 mg Oral BID    mirtazapine  15 mg Oral Nightly    rOPINIRole  0.25 mg Oral Nightly    sevelamer  800 mg Oral TID WC    [START ON 11/15/2021] vitamin D  50,000 Units Oral Weekly    sodium chloride flush  5-40 mL IntraVENous 2 times per day    heparin (porcine)  5,000 Units SubCUTAneous 3 times per day    trimethobenzamide  200 mg IntraMUSCular Once    fentanNYL  50 mcg IntraVENous Once     glucose, 15 g, PRN  dextrose, 12.5 g, PRN  glucagon (rDNA), 1 mg, PRN  dextrose, 100 mL/hr, PRN  pantoprazole, 40 mg, Daily PRN  polyethylene glycol, 17 g, Daily PRN  sodium chloride flush, 5-40 mL, PRN  sodium chloride, 25 mL, PRN  ondansetron, 4 mg, Q8H PRN   Or  ondansetron, 4 mg, Q6H PRN  polyethylene glycol, 17 g, Daily PRN  acetaminophen, 650 mg, Q6H PRN   Or  acetaminophen, 650 mg, Q6H PRN  fentanNYL, 25 mcg, Q3H PRN  hydrALAZINE, 10 mg, Q6H PRN  dextrose, 25 g, PRN         Objective:    BP (!) 198/128   Pulse 88   Temp 97.9 °F (36.6 °C) (Axillary)   Resp 13   Ht 5' 4\" (1.626 m)   Wt 144 lb (65.3 kg) SpO2 100%   BMI 24.72 kg/m²   General Appearance: Sleeping, but awakens easily to voice and converses appropriately. Appears fatigued and uncomfortable  Skin: warm and dry. Hyperpigmentation and healed wound on the distal left lower extremity  Head: normocephalic and atraumatic  Eyes: pupils equal, round, and reactive to light, extraocular eye movements intact, conjunctivae normal  Neck: neck supple and non tender without mass   Pulmonary/Chest: Nonlabored on 3 L nasal cannula. Clear to auscultation bilaterally. Cardiovascular: normal rate, normal S1 and S2 and no carotid bruits  Abdomen: soft, non-tender, mildly distended, normal bowel sounds, no masses or organomegaly  Extremities: no cyanosis, no clubbing and no edema  Neurologic: no cranial nerve deficit and speech normal      Recent Labs     11/11/21  0953 11/11/21  1400 11/11/21  1515 11/11/21  1815 11/12/21  0505     --   --  136 135   K 4.8  --   --  3.2* 3.6   CL 90*  --   --  95* 96*   CO2 30*  --   --  32* 31*   BUN 30*  --   --  7 8   CREATININE 6.0*  --   --  1.9* 2.7*   GLUCOSE 707*   < > 90 93 109*   CALCIUM 8.3*  --   --  7.5* 7.8*    < > = values in this interval not displayed. Recent Labs     11/11/21  0953   ALKPHOS 163*   PROT 6.1*   LABALBU 3.4*   BILITOT 0.2   AST 7   ALT 7       Recent Labs     11/11/21  0953 11/12/21  0505   WBC 9.7 10.7   RBC 2.59* 2.69*   HGB 7.3* 7.6*   HCT 24.0* 23.3*   MCV 92.7 86.6   MCH 28.2 28.3   MCHC 30.4* 32.6   RDW 17.2* 16.7*    181   MPV 10.9 10.6         Radiology:   XR CHEST PORTABLE   Final Result   Stable interstitial prominence bilaterally notable in the lung bases   suggesting subsegmental atelectasis or scarring or persistent pneumonia. Continued follow-up could be helpful for further evaluation.              Assessment/Plan:  Principal Problem:    Hyperosmolar hyperglycemic state (HHS) ([address]  Active Problems:    Uncontrolled type 1 diabetes mellitus with hyperglycemia ([facility] End stage renal disease ([facility]    Hypertensive urgency    Hypothyroidism    Major depressive disorder  Resolved Problems:    * No resolved hospital problems. *      1. Hyperosmolar hyperglycemic state  -Off of insulin drip now. Requiring D5-0.45% sodium chloride to prevent hypoglycemia for now as her blood glucose dropped precipitously on insulin drip. We will discontinue after she eats lunch. She received a dose of Lantus this morning  -Discontinue hourly blood glucose later this evening  -Discontinue serial labs     2. Hypertensive urgency  -Some improvement with hemodialysis  -As needed IV hydralazine for systolic blood pressure greater than 180  -Start amlodipine 5 mg p.o. daily today  -Increase metoprolol tartrate 50 mg p.o. twice daily     3. Uncontrolled type 1 diabetes mellitus  -restarted subcutaneous insulin regimen. States she lost part of her insulin pump. She will need to see endocrinology to be reassessed for insulin pump     4. End-stage renal disease  -Nephrology following for dialysis management  -Continue home Bumex as she does make some urine     5. Hypothyroidism  -Continue home levothyroxine     6. Major depressive disorder  -Continue home Abilify     Code Status: Full code  DVT prophylaxis: Subcutaneous heparin    Disposition: Plan for discharge home. Possibly tomorrow or Sunday    NOTE: This report was transcribed using voice recognition software. Every effort was made to ensure accuracy; however, inadvertent computerized transcription errors may be present.      Electronically signed by Ilir Chavez DO on 11/12/2021 at 10:11 AM

## 2021-11-12 NOTE — CARE COORDINATION
11-12-CM note: ( no covid testing) met with pt for transition of care needs, pt lives with her mother who cares for her and pt's children , pt has Home Hemodialysis that her mother performs 5 days a week,supplies are delivered by [facility] [phone]. Pt has ahx of [facility], pt plans on returning home with no needs, her mother will provide transport.  Electronically signed by Melonie Sosa RN on 11/12/2021 at 11:38 AM

## 2021-11-13 PROBLEM — R11.0 NAUSEA: Status: ACTIVE | Noted: 2021-11-13

## 2021-11-13 LAB
ANION GAP SERPL CALCULATED.3IONS-SCNC: 10 MMOL/L (ref 7–16)
BUN BLDV-MCNC: 13 MG/DL (ref 6–20)
CALCIUM SERPL-MCNC: 8.2 MG/DL (ref 8.6–10.2)
CHLORIDE BLD-SCNC: 98 MMOL/L (ref 98–107)
CO2: 28 MMOL/L (ref 22–29)
CREAT SERPL-MCNC: 4.1 MG/DL (ref 0.5–1)
GFR AFRICAN AMERICAN: 16
GFR NON-AFRICAN AMERICAN: 16 ML/MIN/1.73
GLUCOSE BLD-MCNC: 238 MG/DL (ref 74–99)
HCT VFR BLD CALC: 25.6 % (ref 34–48)
HEMOGLOBIN: 8.2 G/DL (ref 11.5–15.5)
MAGNESIUM: 1.7 MG/DL (ref 1.6–2.6)
MCH RBC QN AUTO: 28.6 PG (ref 26–35)
MCHC RBC AUTO-ENTMCNC: 32 % (ref 32–34.5)
MCV RBC AUTO: 89.2 FL (ref 80–99.9)
METER GLUCOSE: 127 MG/DL (ref 74–99)
METER GLUCOSE: 140 MG/DL (ref 74–99)
METER GLUCOSE: 168 MG/DL (ref 74–99)
METER GLUCOSE: 193 MG/DL (ref 74–99)
METER GLUCOSE: 232 MG/DL (ref 74–99)
METER GLUCOSE: 357 MG/DL (ref 74–99)
METER GLUCOSE: 50 MG/DL (ref 74–99)
METER GLUCOSE: 51 MG/DL (ref 74–99)
PDW BLD-RTO: 17.1 FL (ref 11.5–15)
PHOSPHORUS: 2.5 MG/DL (ref 2.5–4.5)
PLATELET # BLD: 193 E9/L (ref 130–450)
PMV BLD AUTO: 10.6 FL (ref 7–12)
POTASSIUM SERPL-SCNC: 4.4 MMOL/L (ref 3.5–5)
RBC # BLD: 2.87 E12/L (ref 3.5–5.5)
SODIUM BLD-SCNC: 136 MMOL/L (ref 132–146)
WBC # BLD: 9.7 E9/L (ref 4.5–11.5)

## 2021-11-13 PROCEDURE — 87040 BLOOD CULTURE FOR BACTERIA: CPT

## 2021-11-13 PROCEDURE — 2060000000 HC ICU INTERMEDIATE R&B

## 2021-11-13 PROCEDURE — 2500000003 HC RX 250 WO HCPCS: Performed by: INTERNAL MEDICINE

## 2021-11-13 PROCEDURE — 90935 HEMODIALYSIS ONE EVALUATION: CPT

## 2021-11-13 PROCEDURE — 6370000000 HC RX 637 (ALT 250 FOR IP): Performed by: INTERNAL MEDICINE

## 2021-11-13 PROCEDURE — 82962 GLUCOSE BLOOD TEST: CPT

## 2021-11-13 PROCEDURE — 99232 SBSQ HOSP IP/OBS MODERATE 35: CPT | Performed by: INTERNAL MEDICINE

## 2021-11-13 PROCEDURE — 6360000002 HC RX W HCPCS: Performed by: INTERNAL MEDICINE

## 2021-11-13 PROCEDURE — 83735 ASSAY OF MAGNESIUM: CPT

## 2021-11-13 PROCEDURE — 84100 ASSAY OF PHOSPHORUS: CPT

## 2021-11-13 PROCEDURE — 80048 BASIC METABOLIC PNL TOTAL CA: CPT

## 2021-11-13 PROCEDURE — 2580000003 HC RX 258: Performed by: INTERNAL MEDICINE

## 2021-11-13 PROCEDURE — 36415 COLL VENOUS BLD VENIPUNCTURE: CPT

## 2021-11-13 PROCEDURE — 85027 COMPLETE CBC AUTOMATED: CPT

## 2021-11-13 RX ORDER — OXYCODONE HYDROCHLORIDE 5 MG/1
2.5 TABLET ORAL EVERY 4 HOURS PRN
Status: DISCONTINUED | OUTPATIENT
Start: 2021-11-13 | End: 2021-11-15 | Stop reason: HOSPADM

## 2021-11-13 RX ORDER — OXYCODONE HYDROCHLORIDE 5 MG/1
5 TABLET ORAL EVERY 4 HOURS PRN
Status: DISCONTINUED | OUTPATIENT
Start: 2021-11-13 | End: 2021-11-15 | Stop reason: HOSPADM

## 2021-11-13 RX ORDER — FENTANYL CITRATE 50 UG/ML
25 INJECTION, SOLUTION INTRAMUSCULAR; INTRAVENOUS EVERY 6 HOURS PRN
Status: DISCONTINUED | OUTPATIENT
Start: 2021-11-13 | End: 2021-11-15 | Stop reason: HOSPADM

## 2021-11-13 RX ADMIN — INSULIN LISPRO 5 UNITS: 100 INJECTION, SOLUTION INTRAVENOUS; SUBCUTANEOUS at 09:46

## 2021-11-13 RX ADMIN — HEPARIN SODIUM 5000 UNITS: 5000 INJECTION INTRAVENOUS; SUBCUTANEOUS at 20:13

## 2021-11-13 RX ADMIN — FENTANYL CITRATE 25 MCG: 50 INJECTION, SOLUTION INTRAMUSCULAR; INTRAVENOUS at 02:57

## 2021-11-13 RX ADMIN — ONDANSETRON 4 MG: 2 INJECTION INTRAMUSCULAR; INTRAVENOUS at 10:48

## 2021-11-13 RX ADMIN — INSULIN LISPRO 3 UNITS: 100 INJECTION, SOLUTION INTRAVENOUS; SUBCUTANEOUS at 12:57

## 2021-11-13 RX ADMIN — FENTANYL CITRATE 25 MCG: 50 INJECTION, SOLUTION INTRAMUSCULAR; INTRAVENOUS at 06:05

## 2021-11-13 RX ADMIN — HEPARIN SODIUM 5000 UNITS: 5000 INJECTION INTRAVENOUS; SUBCUTANEOUS at 06:05

## 2021-11-13 RX ADMIN — AMLODIPINE BESYLATE 5 MG: 5 TABLET ORAL at 10:52

## 2021-11-13 RX ADMIN — BUMETANIDE 2 MG: 1 TABLET ORAL at 09:36

## 2021-11-13 RX ADMIN — METOPROLOL TARTRATE 50 MG: 50 TABLET, FILM COATED ORAL at 09:44

## 2021-11-13 RX ADMIN — Medication 10 ML: at 10:49

## 2021-11-13 RX ADMIN — LEVOTHYROXINE SODIUM 75 MCG: 0.07 TABLET ORAL at 06:06

## 2021-11-13 RX ADMIN — SEVELAMER CARBONATE 800 MG: 800 TABLET, FILM COATED ORAL at 09:10

## 2021-11-13 RX ADMIN — DEXTROSE MONOHYDRATE 12.5 G: 25 INJECTION, SOLUTION INTRAVENOUS at 19:56

## 2021-11-13 RX ADMIN — LOPERAMIDE HYDROCHLORIDE 2 MG: 2 CAPSULE ORAL at 09:36

## 2021-11-13 RX ADMIN — Medication 10 ML: at 21:00

## 2021-11-13 RX ADMIN — METOPROLOL TARTRATE 50 MG: 50 TABLET, FILM COATED ORAL at 20:12

## 2021-11-13 RX ADMIN — OXYCODONE 5 MG: 5 TABLET ORAL at 12:25

## 2021-11-13 RX ADMIN — ARIPIPRAZOLE 5 MG: 5 TABLET ORAL at 20:12

## 2021-11-13 RX ADMIN — FENTANYL CITRATE 25 MCG: 50 INJECTION, SOLUTION INTRAMUSCULAR; INTRAVENOUS at 00:32

## 2021-11-13 RX ADMIN — MIRTAZAPINE 15 MG: 15 TABLET, FILM COATED ORAL at 20:12

## 2021-11-13 RX ADMIN — PANTOPRAZOLE SODIUM 40 MG: 40 TABLET, DELAYED RELEASE ORAL at 09:37

## 2021-11-13 RX ADMIN — BUMETANIDE 2 MG: 1 TABLET ORAL at 20:12

## 2021-11-13 RX ADMIN — SEVELAMER CARBONATE 800 MG: 800 TABLET, FILM COATED ORAL at 12:54

## 2021-11-13 RX ADMIN — INSULIN LISPRO 1 UNITS: 100 INJECTION, SOLUTION INTRAVENOUS; SUBCUTANEOUS at 12:56

## 2021-11-13 RX ADMIN — ROPINIROLE HYDROCHLORIDE 0.25 MG: 0.25 TABLET, FILM COATED ORAL at 20:12

## 2021-11-13 RX ADMIN — FENTANYL CITRATE 25 MCG: 50 INJECTION, SOLUTION INTRAMUSCULAR; INTRAVENOUS at 09:37

## 2021-11-13 RX ADMIN — FENTANYL CITRATE 25 MCG: 50 INJECTION, SOLUTION INTRAMUSCULAR; INTRAVENOUS at 19:08

## 2021-11-13 RX ADMIN — INSULIN GLARGINE 10 UNITS: 100 INJECTION, SOLUTION SUBCUTANEOUS at 09:45

## 2021-11-13 ASSESSMENT — PAIN SCALES - GENERAL
PAINLEVEL_OUTOF10: 0
PAINLEVEL_OUTOF10: 7
PAINLEVEL_OUTOF10: 8
PAINLEVEL_OUTOF10: 0
PAINLEVEL_OUTOF10: 9
PAINLEVEL_OUTOF10: 7
PAINLEVEL_OUTOF10: 8
PAINLEVEL_OUTOF10: 9

## 2021-11-13 ASSESSMENT — PAIN DESCRIPTION - FREQUENCY
FREQUENCY: CONTINUOUS
FREQUENCY: CONTINUOUS

## 2021-11-13 ASSESSMENT — PAIN DESCRIPTION - PAIN TYPE
TYPE: ACUTE PAIN
TYPE: ACUTE PAIN

## 2021-11-13 ASSESSMENT — PAIN DESCRIPTION - PROGRESSION
CLINICAL_PROGRESSION: GRADUALLY WORSENING

## 2021-11-13 ASSESSMENT — PAIN DESCRIPTION - DESCRIPTORS
DESCRIPTORS: ACHING;DISCOMFORT
DESCRIPTORS: ACHING;DISCOMFORT

## 2021-11-13 ASSESSMENT — PAIN DESCRIPTION - LOCATION
LOCATION: ABDOMEN
LOCATION: ABDOMEN

## 2021-11-13 ASSESSMENT — PAIN DESCRIPTION - ONSET
ONSET: PROGRESSIVE
ONSET: PROGRESSIVE

## 2021-11-13 ASSESSMENT — PAIN DESCRIPTION - ORIENTATION: ORIENTATION: RIGHT;LEFT;LOWER

## 2021-11-13 NOTE — PROGRESS NOTES
Department of Internal Medicine  Nephrology Attending Progress Note    Events reviewed. Patient seen on dialysis. SUBJECTIVE:  We are following Mrs. Reed for ESRD on home HD. Reports feeling better no complaints.     Physical Exam:    VITALS:  /66   Pulse 117   Temp 98 °F (36.7 °C)   Resp 22   Ht 5' 4\" (1.626 m)   Wt 144 lb (65.3 kg)   SpO2 90%   BMI 24.72 kg/m²   24HR INTAKE/OUTPUT:    Intake/Output Summary (Last 24 hours) at 11/13/2021 1619  Last data filed at 11/13/2021 1304  Gross per 24 hour   Intake 425 ml   Output    Net 425 ml     Access: Right IJ tunneled dialysis catheter in place  Constitutional: Awake in no acute distress  HEENT:  Normocephalic, PERRL  Respiratory: Decreased breath sounds on the basis  Cardiovascular/Edema:  RRR, S1/S2  Gastrointestinal:  Soft, PD catheter exit site clean   Neurologic:  Nonfocal, AVELAR  Skin:  Warm, dry, no lesions  Other:  no edema       Scheduled Meds:   insulin lispro  3 Units SubCUTAneous TID     metoprolol tartrate  50 mg Oral BID    amLODIPine  5 mg Oral Daily    insulin lispro  0-6 Units SubCUTAneous TID WC    insulin lispro  0-3 Units SubCUTAneous Nightly    epoetin nena-epbx  10,000 Units SubCUTAneous Once per day on Mon Wed Fri    ARIPiprazole  5 mg Oral Nightly    bumetanide  2 mg Oral BID    insulin glargine  10 Units SubCUTAneous BID    levothyroxine  75 mcg Oral Daily    mirtazapine  15 mg Oral Nightly    rOPINIRole  0.25 mg Oral Nightly    sevelamer  800 mg Oral TID     [START ON 11/15/2021] vitamin D  50,000 Units Oral Weekly    sodium chloride flush  5-40 mL IntraVENous 2 times per day    heparin (porcine)  5,000 Units SubCUTAneous 3 times per day    trimethobenzamide  200 mg IntraMUSCular Once    fentanNYL  50 mcg IntraVENous Once     Continuous Infusions:   dextrose 100 mL/hr (11/12/21 1269)    sodium chloride 25 mL (11/11/21 1725)     PRN Meds:.fentanNYL, oxyCODONE **OR** oxyCODONE, loperamide, glucose, dextrose, glucagon (rDNA), dextrose, pantoprazole, polyethylene glycol, sodium chloride flush, sodium chloride, ondansetron **OR** ondansetron, polyethylene glycol, acetaminophen **OR** acetaminophen, hydrALAZINE, dextrose    DATA:    CBC:   Lab Results   Component Value Date    WBC 9.7 11/13/2021    RBC 2.87 11/13/2021    HGB 8.2 11/13/2021    HCT 25.6 11/13/2021    MCV 89.2 11/13/2021    MCH 28.6 11/13/2021    MCHC 32.0 11/13/2021    RDW 17.1 11/13/2021     11/13/2021    MPV 10.6 11/13/2021     CMP:    Lab Results   Component Value Date     11/13/2021    K 4.4 11/13/2021    K 4.8 11/11/2021    CL 98 11/13/2021    CO2 28 11/13/2021    BUN 13 11/13/2021    CREATININE 4.1 11/13/2021    GFRAA 16 11/13/2021    LABGLOM 16 11/13/2021    GLUCOSE 238 11/13/2021    GLUCOSE 130 05/18/2012    PROT 6.1 11/11/2021    LABALBU 3.4 11/11/2021    LABALBU 4.1 05/18/2012    CALCIUM 8.2 11/13/2021    BILITOT 0.2 11/11/2021    ALKPHOS 163 11/11/2021    AST 7 11/11/2021    ALT 7 11/11/2021     Magnesium:    Lab Results   Component Value Date    MG 1.7 11/13/2021     Phosphorus:    Lab Results   Component Value Date    PHOS 2.5 11/13/2021     Radiology Review:      Chest x-ray November 11, 2021   Stable interstitial prominence bilaterally notable in the lung bases   suggesting subsegmental atelectasis or scarring or persistent pneumonia. Continued follow-up could be helpful for further evaluation.             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 IMPRESSION/RECOMMENDATIONS:      1.  ESRD on home dialysis, to continue HD 3 times a week while in the hospital, patient was seen on dialysis today. 2. HTN, on amlodipine and metoprolol  3. Metabolic alkalosis, bicarbonate level on admission 30, due to persistent vomiting  4. MBD of CKD, on sevelamer  5. Anemia of CKD, normocytic, on epoetin alpha  6. History of restless legs syndrome on ropinirole  7.  History of gastroparesis    Plan:    · HD today and 3 times a week  · Continue epoetin alpha  · Continue Bumex 2 mg twice daily  · Continue amlodipine 5 mg daily  · Continue metoprolol 50 mg twice daily  · Continue to monitor labs

## 2021-11-13 NOTE — FLOWSHEET NOTE
11/13/21 1822   Vital Signs   /66   Temp 98 °F (36.7 °C)   Pulse 78   Resp 16   Weight 139 lb 15.9 oz (63.5 kg)   Weight Method Estimated   Percent Weight Change -2.78   Pain Assessment   Pain Assessment 0-10   Pain Level 0   Post-Hemodialysis Assessment   Post-Treatment Procedures Blood returned; Catheter capped, clamped with Citrate x 2 ports   Machine Disinfection Process Exterior Machine Disinfection   Rinseback Volume (ml) 300 ml   Dialyzer Clearance Clear   Duration of Treatment (minutes) 240 minutes   Hemodialysis Output (ml) 2000 ml   NET Removed (ml) 1700 ml   Tolerated Treatment Good   Patient Response to Treatment tolerated well   Bilateral Breath Sounds Clear   Edema None   Physician Notified?  Yes

## 2021-11-13 NOTE — PROGRESS NOTES
[address] [place], [place] [address]ist   Progress Note    Admitting Date and Time: 11/11/2021  8:32 AM  Admit Dx: Hyperosmolar hyperglycemic state (HHS) ([facility] [address] [E11.00, E11.65]    Subjective/interval history:    11/12: Pt admitted yesterday evening with HHS. Patient received IV insulin bolus and was started on drip in the ED, and her blood glucose quickly dropped. Insulin drip was discontinued and she is now on D5 half-normal at 40 mL/h. Given her morning dose of Lantus to prevent DKA. At this time she is complaining of pain with her dialysis catheter that has been going on for several weeks. 11/13: Patient seen and examined at hemodialysis. States she feels fatigued, nauseous, with some abdominal discomfort. ROS: denies fever, chills, cp, sob, n/v, HA unless stated above.      insulin lispro  3 Units SubCUTAneous TID WC    metoprolol tartrate  50 mg Oral BID    amLODIPine  5 mg Oral Daily    insulin lispro  0-6 Units SubCUTAneous TID WC    insulin lispro  0-3 Units SubCUTAneous Nightly    epoetin nena-epbx  10,000 Units SubCUTAneous Once per day on Mon Wed Fri    ARIPiprazole  5 mg Oral Nightly    bumetanide  2 mg Oral BID    insulin glargine  10 Units SubCUTAneous BID    levothyroxine  75 mcg Oral Daily    mirtazapine  15 mg Oral Nightly    rOPINIRole  0.25 mg Oral Nightly    sevelamer  800 mg Oral TID     [START ON 11/15/2021] vitamin D  50,000 Units Oral Weekly    sodium chloride flush  5-40 mL IntraVENous 2 times per day    heparin (porcine)  5,000 Units SubCUTAneous 3 times per day    trimethobenzamide  200 mg IntraMUSCular Once    fentanNYL  50 mcg IntraVENous Once     fentanNYL, 25 mcg, Q6H PRN  oxyCODONE, 2.5 mg, Q4H PRN   Or  oxyCODONE, 5 mg, Q4H PRN  loperamide, 2 mg, Daily PRN  glucose, 15 g, PRN  dextrose, 12.5 g, PRN  glucagon (rDNA), 1 mg, PRN  dextrose, 100 mL/hr, PRN  pantoprazole, 40 mg, Daily PRN  polyethylene glycol, 17 g, Daily PRN  sodium chloride flush, 5-40 mL, PRN  sodium chloride, 25 mL, PRN  ondansetron, 4 mg, Q8H PRN   Or  ondansetron, 4 mg, Q6H PRN  polyethylene glycol, 17 g, Daily PRN  acetaminophen, 650 mg, Q6H PRN   Or  acetaminophen, 650 mg, Q6H PRN  hydrALAZINE, 10 mg, Q6H PRN  dextrose, 25 g, PRN         Objective:    /66   Pulse 117   Temp 98 °F (36.7 °C)   Resp 22   Ht 5' 4\" (1.626 m)   Wt 144 lb (65.3 kg)   SpO2 90%   BMI 24.72 kg/m²   General Appearance: Sleeping, but awakens easily to voice and converses appropriately. Appears fatigued and uncomfortable  Skin: warm and dry. Hyperpigmentation and healed wound on the distal left lower extremity  Head: normocephalic and atraumatic  Eyes: pupils equal, round, and reactive to light, extraocular eye movements intact, conjunctivae normal  Neck: neck supple and non tender without mass   Pulmonary/Chest: Nonlabored on 3 L nasal cannula. Clear to auscultation bilaterally.   Cardiovascular: normal rate, normal S1 and S2 and no carotid bruits  Abdomen: soft, non-tender, mildly distended, normal bowel sounds, no masses or organomegaly  Extremities: no cyanosis, no clubbing and no edema  Neurologic: no cranial nerve deficit and speech normal      Recent Labs     11/11/21  1815 11/12/21  0505 11/13/21  0615    135 136   K 3.2* 3.6 4.4   CL 95* 96* 98   CO2 32* 31* 28   BUN 7 8 13   CREATININE 1.9* 2.7* 4.1*   GLUCOSE 93 109* 238*   CALCIUM 7.5* 7.8* 8.2*       Recent Labs     11/11/21  0953   ALKPHOS 163*   PROT 6.1*   LABALBU 3.4*   BILITOT 0.2   AST 7   ALT 7       Recent Labs     11/11/21  0953 11/12/21  0505 11/13/21  0615   WBC 9.7 10.7 9.7   RBC 2.59* 2.69* 2.87*   HGB 7.3* 7.6* 8.2*   HCT 24.0* 23.3* 25.6*   MCV 92.7 86.6 89.2   MCH 28.2 28.3 28.6   MCHC 30.4* 32.6 32.0   RDW 17.2* 16.7* 17.1*    181 193   MPV 10.9 10.6 10.6         Radiology:   XR CHEST PORTABLE   Final Result   Stable interstitial prominence bilaterally notable in the lung bases   suggesting subsegmental atelectasis or scarring or persistent pneumonia. Continued follow-up could be helpful for further evaluation. Assessment/Plan:  Principal Problem:    Hyperosmolar hyperglycemic state (HHS) ([facility] [address]  Active Problems:    Uncontrolled type 1 diabetes mellitus with hyperglycemia (HCC)    End stage renal disease ([facility] [address]    Hypertensive urgency    Hypothyroidism    Major depressive disorder    Nausea  Resolved Problems:    * No resolved hospital problems. *      1. Hyperosmolar hyperglycemic state (resolved)  -Off insulin drip, no longer requiring D5 for hypoglycemia   -Continue subcutaneous insulin regimen     2. Hypertensive urgency  -Some improvement with hemodialysis  -As needed IV hydralazine for systolic blood pressure greater than 180  -Amlodipine 5 mg p.o. daily started 11/12 metoprolol tartrate increased to 50 mg twice daily 11/12 with improvement in blood pressure can  -Continue dialysis for volume management     3. Uncontrolled type 1 diabetes mellitus  -restarted subcutaneous insulin regimen. States she lost part of her insulin pump. She will need to see endocrinology to be reassessed for insulin pump     4. End-stage renal disease  -Nephrology following for dialysis management  -Continue home Bumex as she does make some urine     5. Hypothyroidism  -Continue home levothyroxine     6. Major depressive disorder  -Continue home Abilify    7. SIRS criteria positive  -At this point she does not clinically appear to be septic, however she is complaining of pain at dialysis catheter site. We will draw blood cultures from dialysis port. Discussed with dialysis RN    8. Nausea  -decrease/wean narcotic pain medications with plan to discontinue at discharge    9. Low back and left lower extremity pain   -wean pain medications as noted above      Code Status: Full code  DVT prophylaxis: Subcutaneous heparin    Disposition: Plan for discharge home.   Possibly over the weekend or early next week    NOTE: This report was transcribed using voice recognition software. Every effort was made to ensure accuracy; however, inadvertent computerized transcription errors may be present.      Electronically signed by Josue White DO on 11/13/2021 at 4:55 PM

## 2021-11-13 NOTE — PROGRESS NOTES
Pt. BS at 2020 was 96, pt. Did not want to take Lantus. So lantus held. At 2300 pt. Felt like her blood sugar was low. On check she was 52. Gave pt. Apple juice per request and recheck at 2350 she was 45. Gave dextrose 25g. Per PRN order and started D5W @100. per prn order. Recheck at [address] blood sugar was 193. Stopped D5W infusion. Pt. Blood pressure went to 92/46 and she was 87% on Roon air, put pt. On 2L NC,. Pt. Hawk Christensenin recovered after dextrose given. BP at 0017 142/87, sat. 100% on Room air. Notified Dr Ulices Goodman, will recheck blood sugar at 2am per order.

## 2021-11-14 LAB
ANION GAP SERPL CALCULATED.3IONS-SCNC: 7 MMOL/L (ref 7–16)
BASOPHILS ABSOLUTE: 0.08 E9/L (ref 0–0.2)
BASOPHILS RELATIVE PERCENT: 1.2 % (ref 0–2)
BUN BLDV-MCNC: 5 MG/DL (ref 6–20)
CALCIUM SERPL-MCNC: 7.9 MG/DL (ref 8.6–10.2)
CHLORIDE BLD-SCNC: 98 MMOL/L (ref 98–107)
CO2: 29 MMOL/L (ref 22–29)
CREAT SERPL-MCNC: 2.6 MG/DL (ref 0.5–1)
EOSINOPHILS ABSOLUTE: 0.54 E9/L (ref 0.05–0.5)
EOSINOPHILS RELATIVE PERCENT: 8.4 % (ref 0–6)
GFR AFRICAN AMERICAN: 26
GFR NON-AFRICAN AMERICAN: 26 ML/MIN/1.73
GLUCOSE BLD-MCNC: 75 MG/DL (ref 74–99)
HCT VFR BLD CALC: 23.3 % (ref 34–48)
HEMOGLOBIN: 7.2 G/DL (ref 11.5–15.5)
IMMATURE GRANULOCYTES #: 0.02 E9/L
IMMATURE GRANULOCYTES %: 0.3 % (ref 0–5)
LYMPHOCYTES ABSOLUTE: 2.28 E9/L (ref 1.5–4)
LYMPHOCYTES RELATIVE PERCENT: 35.3 % (ref 20–42)
MAGNESIUM: 1.6 MG/DL (ref 1.6–2.6)
MCH RBC QN AUTO: 28.3 PG (ref 26–35)
MCHC RBC AUTO-ENTMCNC: 30.9 % (ref 32–34.5)
MCV RBC AUTO: 91.7 FL (ref 80–99.9)
METER GLUCOSE: 100 MG/DL (ref 74–99)
METER GLUCOSE: 103 MG/DL (ref 74–99)
METER GLUCOSE: 108 MG/DL (ref 74–99)
METER GLUCOSE: 118 MG/DL (ref 74–99)
METER GLUCOSE: 122 MG/DL (ref 74–99)
METER GLUCOSE: 140 MG/DL (ref 74–99)
METER GLUCOSE: 180 MG/DL (ref 74–99)
METER GLUCOSE: 202 MG/DL (ref 74–99)
METER GLUCOSE: 70 MG/DL (ref 74–99)
METER GLUCOSE: 70 MG/DL (ref 74–99)
METER GLUCOSE: 91 MG/DL (ref 74–99)
METER GLUCOSE: 99 MG/DL (ref 74–99)
METER GLUCOSE: <40 MG/DL (ref 74–99)
METER GLUCOSE: <40 MG/DL (ref 74–99)
MONOCYTES ABSOLUTE: 0.25 E9/L (ref 0.1–0.95)
MONOCYTES RELATIVE PERCENT: 3.9 % (ref 2–12)
NEUTROPHILS ABSOLUTE: 3.28 E9/L (ref 1.8–7.3)
NEUTROPHILS RELATIVE PERCENT: 50.9 % (ref 43–80)
PDW BLD-RTO: 17.7 FL (ref 11.5–15)
PHOSPHORUS: 2 MG/DL (ref 2.5–4.5)
PLATELET # BLD: 142 E9/L (ref 130–450)
PMV BLD AUTO: 10.5 FL (ref 7–12)
POTASSIUM SERPL-SCNC: 4 MMOL/L (ref 3.5–5)
RBC # BLD: 2.54 E12/L (ref 3.5–5.5)
SODIUM BLD-SCNC: 134 MMOL/L (ref 132–146)
WBC # BLD: 6.5 E9/L (ref 4.5–11.5)

## 2021-11-14 PROCEDURE — 83735 ASSAY OF MAGNESIUM: CPT

## 2021-11-14 PROCEDURE — 84100 ASSAY OF PHOSPHORUS: CPT

## 2021-11-14 PROCEDURE — 6360000002 HC RX W HCPCS: Performed by: INTERNAL MEDICINE

## 2021-11-14 PROCEDURE — 82962 GLUCOSE BLOOD TEST: CPT

## 2021-11-14 PROCEDURE — 36415 COLL VENOUS BLD VENIPUNCTURE: CPT

## 2021-11-14 PROCEDURE — 2500000003 HC RX 250 WO HCPCS: Performed by: INTERNAL MEDICINE

## 2021-11-14 PROCEDURE — 6370000000 HC RX 637 (ALT 250 FOR IP): Performed by: INTERNAL MEDICINE

## 2021-11-14 PROCEDURE — 80048 BASIC METABOLIC PNL TOTAL CA: CPT

## 2021-11-14 PROCEDURE — 2060000000 HC ICU INTERMEDIATE R&B

## 2021-11-14 PROCEDURE — 85025 COMPLETE CBC W/AUTO DIFF WBC: CPT

## 2021-11-14 PROCEDURE — 2580000003 HC RX 258: Performed by: INTERNAL MEDICINE

## 2021-11-14 PROCEDURE — 99238 HOSP IP/OBS DSCHRG MGMT 30/<: CPT | Performed by: INTERNAL MEDICINE

## 2021-11-14 PROCEDURE — 2500000003 HC RX 250 WO HCPCS: Performed by: NURSE PRACTITIONER

## 2021-11-14 RX ORDER — INSULIN GLARGINE 100 [IU]/ML
6 INJECTION, SOLUTION SUBCUTANEOUS 2 TIMES DAILY
Status: DISCONTINUED | OUTPATIENT
Start: 2021-11-15 | End: 2021-11-15 | Stop reason: HOSPADM

## 2021-11-14 RX ORDER — METOPROLOL TARTRATE 50 MG/1
50 TABLET, FILM COATED ORAL 2 TIMES DAILY
Qty: 60 TABLET | Refills: 0 | Status: ON HOLD | OUTPATIENT
Start: 2021-11-14 | End: 2021-12-12 | Stop reason: HOSPADM

## 2021-11-14 RX ORDER — AMLODIPINE BESYLATE 5 MG/1
5 TABLET ORAL DAILY
Qty: 30 TABLET | Refills: 0 | Status: ON HOLD | OUTPATIENT
Start: 2021-11-15 | End: 2021-12-12 | Stop reason: HOSPADM

## 2021-11-14 RX ORDER — DEXTROSE MONOHYDRATE 25 G/50ML
12.5 INJECTION, SOLUTION INTRAVENOUS ONCE
Status: COMPLETED | OUTPATIENT
Start: 2021-11-14 | End: 2021-11-14

## 2021-11-14 RX ADMIN — AMLODIPINE BESYLATE 5 MG: 5 TABLET ORAL at 10:06

## 2021-11-14 RX ADMIN — DEXTROSE MONOHYDRATE 12.5 G: 25 INJECTION, SOLUTION INTRAVENOUS at 19:16

## 2021-11-14 RX ADMIN — Medication 10 ML: at 22:10

## 2021-11-14 RX ADMIN — BUMETANIDE 2 MG: 1 TABLET ORAL at 22:07

## 2021-11-14 RX ADMIN — SEVELAMER CARBONATE 800 MG: 800 TABLET, FILM COATED ORAL at 10:06

## 2021-11-14 RX ADMIN — INSULIN GLARGINE 10 UNITS: 100 INJECTION, SOLUTION SUBCUTANEOUS at 12:32

## 2021-11-14 RX ADMIN — PANTOPRAZOLE SODIUM 40 MG: 40 TABLET, DELAYED RELEASE ORAL at 10:06

## 2021-11-14 RX ADMIN — BUMETANIDE 2 MG: 1 TABLET ORAL at 10:06

## 2021-11-14 RX ADMIN — INSULIN LISPRO 1 UNITS: 100 INJECTION, SOLUTION INTRAVENOUS; SUBCUTANEOUS at 14:26

## 2021-11-14 RX ADMIN — LOPERAMIDE HYDROCHLORIDE 2 MG: 2 CAPSULE ORAL at 10:07

## 2021-11-14 RX ADMIN — DEXTROSE MONOHYDRATE 25 G: 25 INJECTION, SOLUTION INTRAVENOUS at 18:35

## 2021-11-14 RX ADMIN — OXYCODONE 5 MG: 5 TABLET ORAL at 10:06

## 2021-11-14 RX ADMIN — OXYCODONE 5 MG: 5 TABLET ORAL at 19:57

## 2021-11-14 RX ADMIN — MIRTAZAPINE 15 MG: 15 TABLET, FILM COATED ORAL at 22:08

## 2021-11-14 RX ADMIN — HEPARIN SODIUM 5000 UNITS: 5000 INJECTION INTRAVENOUS; SUBCUTANEOUS at 22:08

## 2021-11-14 RX ADMIN — METOPROLOL TARTRATE 50 MG: 50 TABLET, FILM COATED ORAL at 10:06

## 2021-11-14 RX ADMIN — OXYCODONE 5 MG: 5 TABLET ORAL at 01:10

## 2021-11-14 RX ADMIN — ARIPIPRAZOLE 5 MG: 5 TABLET ORAL at 22:08

## 2021-11-14 RX ADMIN — SEVELAMER CARBONATE 800 MG: 800 TABLET, FILM COATED ORAL at 14:20

## 2021-11-14 RX ADMIN — Medication 10 ML: at 10:07

## 2021-11-14 RX ADMIN — HEPARIN SODIUM 5000 UNITS: 5000 INJECTION INTRAVENOUS; SUBCUTANEOUS at 05:45

## 2021-11-14 RX ADMIN — FENTANYL CITRATE 25 MCG: 50 INJECTION, SOLUTION INTRAMUSCULAR; INTRAVENOUS at 22:09

## 2021-11-14 RX ADMIN — HEPARIN SODIUM 5000 UNITS: 5000 INJECTION INTRAVENOUS; SUBCUTANEOUS at 14:27

## 2021-11-14 RX ADMIN — FENTANYL CITRATE 25 MCG: 50 INJECTION, SOLUTION INTRAMUSCULAR; INTRAVENOUS at 12:32

## 2021-11-14 RX ADMIN — HEPARIN SODIUM 5000 UNITS: 5000 INJECTION INTRAVENOUS; SUBCUTANEOUS at 14:21

## 2021-11-14 RX ADMIN — INSULIN LISPRO 3 UNITS: 100 INJECTION, SOLUTION INTRAVENOUS; SUBCUTANEOUS at 14:20

## 2021-11-14 RX ADMIN — ROPINIROLE HYDROCHLORIDE 0.25 MG: 0.25 TABLET, FILM COATED ORAL at 22:08

## 2021-11-14 RX ADMIN — DEXTROSE 15 G: 15 GEL ORAL at 19:14

## 2021-11-14 RX ADMIN — METOPROLOL TARTRATE 50 MG: 50 TABLET, FILM COATED ORAL at 22:08

## 2021-11-14 RX ADMIN — LEVOTHYROXINE SODIUM 75 MCG: 0.07 TABLET ORAL at 05:45

## 2021-11-14 ASSESSMENT — PAIN SCALES - GENERAL
PAINLEVEL_OUTOF10: 0
PAINLEVEL_OUTOF10: 7
PAINLEVEL_OUTOF10: 8
PAINLEVEL_OUTOF10: 0
PAINLEVEL_OUTOF10: 0
PAINLEVEL_OUTOF10: 3
PAINLEVEL_OUTOF10: 10
PAINLEVEL_OUTOF10: 0
PAINLEVEL_OUTOF10: 0
PAINLEVEL_OUTOF10: 10
PAINLEVEL_OUTOF10: 7
PAINLEVEL_OUTOF10: 8

## 2021-11-14 ASSESSMENT — PAIN DESCRIPTION - FREQUENCY: FREQUENCY: CONTINUOUS

## 2021-11-14 ASSESSMENT — PAIN DESCRIPTION - PROGRESSION
CLINICAL_PROGRESSION: GRADUALLY WORSENING
CLINICAL_PROGRESSION: GRADUALLY WORSENING

## 2021-11-14 ASSESSMENT — PAIN DESCRIPTION - PAIN TYPE: TYPE: CHRONIC PAIN

## 2021-11-14 NOTE — PROGRESS NOTES
Went into patient's room to perform discharge instructions and patient was incoherent, drooling, and moaning. Checked blood glucose level for 2 RR LOWS ordered per protocol lab glucose draw. Gave IV dextrose as per PRN hypoglemica orders. Paged Dr. Yossi Rey to bedside. Upon blood sugar recheck, level of 180. Patient is A&Ox 3 however is still slurring her words and lethargic. Called parent, Shahida Maki and informed of incident and to delay coming to  patient and was told that the patient does not take any Humalog, the medication was discontinued and that the patient only takes 6 units of Lantus BID. I corrected the medications on the Home Medications tab and will review with Dr. Yossi Rey when he is available.

## 2021-11-14 NOTE — DISCHARGE SUMMARY
[facility] Physician Discharge Summary       Tremaine Devlin MD    Call in 1 day        Activity level: Resume normal activity    Diet: ADULT DIET; Regular; 4 carb choices (60 gm/meal)    Labs:Routine labs per primary care physician     Condition at discharge: Stable     Dispo: Home       Patient ID:  Bev Young  [id number]  61 y.o.  1990    Admit date: 11/11/2021    Discharge date and time:  11/14/2021  5:05 PM    Admission Diagnoses: Principal Problem:    Hyperosmolar hyperglycemic state (HHS) ([facility] [address]  Active Problems:    Uncontrolled type 1 diabetes mellitus with hyperglycemia (HCC)    End stage renal disease ([facility] [address]    Hypertensive urgency    Hypothyroidism    Major depressive disorder    Nausea  Resolved Problems:    * No resolved hospital problems. *      Discharge Diagnoses: Principal Problem:    Hyperosmolar hyperglycemic state (HHS) ([address]  Active Problems:    Uncontrolled type 1 diabetes mellitus with hyperglycemia (HCC)    End stage renal disease ([facility] [address]    Hypertensive urgency    Hypothyroidism    Major depressive disorder    Nausea  Resolved Problems:    * No resolved hospital problems. *      Consults:  IP CONSULT TO CRITICAL CARE  IP CONSULT TO NEPHROLOGY  IP CONSULT TO NEPHROLOGY    Procedures: Femoral central venous catheter placement. Regularly scheduled hemodialysis. Hospital Course: This is a 17-year-old female with a history of end-stage renal disease on hemodialysis and uncontrolled type 1 diabetes mellitus that was admitted to the hospital with HHS. She was treated with IV fluids, insulin drip, and her regular scheduled hemodialysis was continued. Patient met SIRS criteria so blood culture drawn from her hemodialysis catheter, however clinically did not appear to have infection; afebrile and did not have leukocytosis at time of discharge.   Patient was eager for discharge home, so I discussed that she can be discharged and we will contact her if there is any abnormal result on the blood culture. Discussed seeking immediate medical attention for any fever, chills, chest pain, shortness of breath, nausea, vomiting, etc.  She states understanding. Discharge Exam:  Vitals:    11/14/21 0645 11/14/21 0815 11/14/21 1155 11/14/21 1549   BP: 116/66  117/71 117/76   Pulse:    87   Resp:    10   Temp: 97.2 °F (36.2 °C)  97.9 °F (36.6 °C) 98 °F (36.7 °C)   TempSrc: Temporal  Oral Oral   SpO2:  96% 93% 94%   Weight:       Height:           General Appearance: Sleeping, but awakens easily to voice and converses appropriately. Appears fatigued and uncomfortable  Skin: warm and dry. Hyperpigmentation and healed wound on the distal left lower extremity  Head: normocephalic and atraumatic  Eyes: pupils equal, round, and reactive to light, extraocular eye movements intact, conjunctivae normal  Neck: neck supple and non tender without mass   Pulmonary/Chest: Nonlabored on  room air. Clear to auscultation bilaterally. Cardiovascular: normal rate, normal S1 and S2 and no carotid bruits  Abdomen: soft, non-tender, mildly distended, normal bowel sounds, no masses or organomegaly  Extremities: no cyanosis, no clubbing and no edema  Neurologic: no cranial nerve deficit and speech normal  I/O last 3 completed shifts: In: 30 [I.V.:30]  Out: 2000   No intake/output data recorded. LABS:  Recent Labs     11/12/21  0505 11/13/21 0615 11/14/21  0545    136 134   K 3.6 4.4 4.0   CL 96* 98 98   CO2 31* 28 29   BUN 8 13 5*   CREATININE 2.7* 4.1* 2.6*   GLUCOSE 109* 238* 75   CALCIUM 7.8* 8.2* 7.9*       Recent Labs     11/12/21  0505 11/13/21  0615 11/14/21  0545   WBC 10.7 9.7 6.5   RBC 2.69* 2.87* 2.54*   HGB 7.6* 8.2* 7.2*   HCT 23.3* 25.6* 23.3*   MCV 86.6 89.2 91.7   MCH 28.3 28.6 28.3   MCHC 32.6 32.0 30.9*   RDW 16.7* 17.1* 17.7*    193 142   MPV 10.6 10.6 10.5       No results for input(s): POCGLU in the last 72 hours.       Imaging:  XR CHEST PORTABLE    Result Date: 11/11/2021  EXAMINATION: ONE XRAY VIEW OF THE CHEST 11/11/2021 9:24 am COMPARISON: September 28, 2021 HISTORY: ORDERING SYSTEM PROVIDED HISTORY: shorntess of breath TECHNOLOGIST PROVIDED HISTORY: Reason for exam:->shorntess of breath FINDINGS: Right-sided central venous catheter is present with distal tip of location of right atrial/caval junction or right atrium. Interstitial prominence bilaterally notable in the lung bases and perihilar locations. No evidence of pleural effusion. No pneumothorax. The heart is normal in size. Stable interstitial prominence bilaterally notable in the lung bases suggesting subsegmental atelectasis or scarring or persistent pneumonia. Continued follow-up could be helpful for further evaluation. Patient Instructions:   Current Discharge Medication List      START taking these medications    Details   amLODIPine (NORVASC) 5 MG tablet Take 1 tablet by mouth daily  Qty: 30 tablet, Refills: 0         CONTINUE these medications which have CHANGED    Details   metoprolol tartrate (LOPRESSOR) 50 MG tablet Take 1 tablet by mouth 2 times daily  Qty: 60 tablet, Refills: 0         CONTINUE these medications which have NOT CHANGED    Details   bumetanide (BUMEX) 2 MG tablet Take 2 mg by mouth 2 times daily      pantoprazole (PROTONIX) 40 MG tablet Take 40 mg by mouth daily as needed (Reflux)      insulin glargine (LANTUS) 100 UNIT/ML injection vial Inject 10 Units into the skin 2 times daily  Qty: 10 mL, Refills: 3      vitamin D (ERGOCALCIFEROL) 1.25 MG (37685 UT) CAPS capsule Take 1 capsule by mouth once a week  Qty: 5 capsule, Refills: 0      sevelamer (RENVELA) 800 MG tablet Take 1 tablet by mouth 3 times daily (with meals) New lower dose  Qty: 90 tablet, Refills: 3      insulin lispro, 1 Unit Dial, (HUMALOG KWIKPEN) 100 UNIT/ML SOPN Inject 3 units with meals + sliding scale.  MAX 30U/day  Qty: 10 pen, Refills: 3    Associated Diagnoses: Type 1 diabetes mellitus with other specified complication (HCC)      mirtazapine (REMERON) 15 MG tablet Take 15 mg by mouth nightly      polyethylene glycol (GLYCOLAX) 17 g packet Take 17 g by mouth daily as needed for Constipation      ARIPiprazole (ABILIFY) 5 MG tablet Take 5 mg by mouth nightly      levothyroxine (SYNTHROID) 75 MCG tablet TAKE 1 TABLET BY MOUTH IN THE MORNING BEFORE BREAKFAST  Qty: 60 tablet, Refills: 0      rOPINIRole (REQUIP) 0.25 MG tablet Take 0.25 mg by mouth nightly         STOP taking these medications       gentamicin (GARAMYCIN) 0.1 % cream Comments:   Reason for Stopping:                 Note that less than 30 minutes was spent in preparing discharge papers, discussing discharge with patient, medication review, etc.    NOTE: This report was transcribed using voice recognition software. Every effort was made to ensure accuracy; however, inadvertent computerized transcription errors may be present.      Signed:  Electronically signed by Herminio Bernal DO on 11/14/2021 at 5:05 PM

## 2021-11-15 VITALS
TEMPERATURE: 98 F | OXYGEN SATURATION: 98 % | DIASTOLIC BLOOD PRESSURE: 50 MMHG | RESPIRATION RATE: 19 BRPM | HEART RATE: 87 BPM | WEIGHT: 153 LBS | BODY MASS INDEX: 26.12 KG/M2 | HEIGHT: 64 IN | SYSTOLIC BLOOD PRESSURE: 98 MMHG

## 2021-11-15 LAB
ANION GAP SERPL CALCULATED.3IONS-SCNC: 11 MMOL/L (ref 7–16)
BUN BLDV-MCNC: 13 MG/DL (ref 6–20)
CALCIUM SERPL-MCNC: 8.5 MG/DL (ref 8.6–10.2)
CHLORIDE BLD-SCNC: 101 MMOL/L (ref 98–107)
CO2: 27 MMOL/L (ref 22–29)
CREAT SERPL-MCNC: 4.5 MG/DL (ref 0.5–1)
GFR AFRICAN AMERICAN: 14
GFR NON-AFRICAN AMERICAN: 14 ML/MIN/1.73
GLUCOSE BLD-MCNC: 226 MG/DL (ref 74–99)
MAGNESIUM: 1.9 MG/DL (ref 1.6–2.6)
METER GLUCOSE: 180 MG/DL (ref 74–99)
METER GLUCOSE: 91 MG/DL (ref 74–99)
METER GLUCOSE: 94 MG/DL (ref 74–99)
METER GLUCOSE: 97 MG/DL (ref 74–99)
METER GLUCOSE: 99 MG/DL (ref 74–99)
PHOSPHORUS: 3.4 MG/DL (ref 2.5–4.5)
POTASSIUM SERPL-SCNC: 5.1 MMOL/L (ref 3.5–5)
SODIUM BLD-SCNC: 139 MMOL/L (ref 132–146)

## 2021-11-15 PROCEDURE — 36415 COLL VENOUS BLD VENIPUNCTURE: CPT

## 2021-11-15 PROCEDURE — 6370000000 HC RX 637 (ALT 250 FOR IP): Performed by: INTERNAL MEDICINE

## 2021-11-15 PROCEDURE — 84100 ASSAY OF PHOSPHORUS: CPT

## 2021-11-15 PROCEDURE — 6360000002 HC RX W HCPCS: Performed by: INTERNAL MEDICINE

## 2021-11-15 PROCEDURE — 83735 ASSAY OF MAGNESIUM: CPT

## 2021-11-15 PROCEDURE — 2580000003 HC RX 258: Performed by: INTERNAL MEDICINE

## 2021-11-15 PROCEDURE — 82962 GLUCOSE BLOOD TEST: CPT

## 2021-11-15 PROCEDURE — 90935 HEMODIALYSIS ONE EVALUATION: CPT

## 2021-11-15 PROCEDURE — 99231 SBSQ HOSP IP/OBS SF/LOW 25: CPT | Performed by: INTERNAL MEDICINE

## 2021-11-15 PROCEDURE — 80048 BASIC METABOLIC PNL TOTAL CA: CPT

## 2021-11-15 RX ORDER — OXYCODONE HYDROCHLORIDE AND ACETAMINOPHEN 5; 325 MG/1; MG/1
1 TABLET ORAL EVERY 6 HOURS PRN
Qty: 12 TABLET | Refills: 0 | Status: SHIPPED | OUTPATIENT
Start: 2021-11-15 | End: 2021-11-18

## 2021-11-15 RX ORDER — INSULIN GLARGINE 100 [IU]/ML
6 INJECTION, SOLUTION SUBCUTANEOUS 2 TIMES DAILY
Qty: 10 ML | Refills: 3 | Status: ON HOLD
Start: 2021-11-15 | End: 2022-01-24 | Stop reason: SDUPTHER

## 2021-11-15 RX ADMIN — OXYCODONE 5 MG: 5 TABLET ORAL at 11:04

## 2021-11-15 RX ADMIN — FENTANYL CITRATE 25 MCG: 50 INJECTION, SOLUTION INTRAMUSCULAR; INTRAVENOUS at 07:04

## 2021-11-15 RX ADMIN — BUMETANIDE 2 MG: 1 TABLET ORAL at 08:46

## 2021-11-15 RX ADMIN — METOPROLOL TARTRATE 50 MG: 50 TABLET, FILM COATED ORAL at 08:46

## 2021-11-15 RX ADMIN — ERGOCALCIFEROL 50000 UNITS: 1.25 CAPSULE ORAL at 08:46

## 2021-11-15 RX ADMIN — OXYCODONE 5 MG: 5 TABLET ORAL at 05:23

## 2021-11-15 RX ADMIN — EPOETIN ALFA-EPBX 10000 UNITS: 10000 INJECTION, SOLUTION INTRAVENOUS; SUBCUTANEOUS at 08:47

## 2021-11-15 RX ADMIN — HEPARIN SODIUM 5000 UNITS: 5000 INJECTION INTRAVENOUS; SUBCUTANEOUS at 05:23

## 2021-11-15 RX ADMIN — SEVELAMER CARBONATE 800 MG: 800 TABLET, FILM COATED ORAL at 13:55

## 2021-11-15 RX ADMIN — AMLODIPINE BESYLATE 5 MG: 5 TABLET ORAL at 08:46

## 2021-11-15 RX ADMIN — FENTANYL CITRATE 25 MCG: 50 INJECTION, SOLUTION INTRAMUSCULAR; INTRAVENOUS at 13:55

## 2021-11-15 RX ADMIN — LOPERAMIDE HYDROCHLORIDE 2 MG: 2 CAPSULE ORAL at 11:04

## 2021-11-15 RX ADMIN — SEVELAMER CARBONATE 800 MG: 800 TABLET, FILM COATED ORAL at 08:46

## 2021-11-15 RX ADMIN — Medication 10 ML: at 09:00

## 2021-11-15 RX ADMIN — LEVOTHYROXINE SODIUM 75 MCG: 0.07 TABLET ORAL at 05:23

## 2021-11-15 ASSESSMENT — PAIN SCALES - GENERAL
PAINLEVEL_OUTOF10: 5
PAINLEVEL_OUTOF10: 10
PAINLEVEL_OUTOF10: 8
PAINLEVEL_OUTOF10: 0
PAINLEVEL_OUTOF10: 8
PAINLEVEL_OUTOF10: 0

## 2021-11-15 NOTE — CARE COORDINATION
Patient kept overnight because she had hypoglycemia. This morning blood sugar was 91 so Lantus was not given     She is feeling better but was asking for some Imodium and another dose of the IV pain medication prior to discharge. We will send her home with 3-day prescription of Percocet 5/325 for pain.

## 2021-11-15 NOTE — PROGRESS NOTES
Dr. Yunier Torres notified regarding pts blood glucose of 99 and correction of home med list per patient's mother. Orders to correct lantus from 10 units to 6 units of lantus BID. Holding for tonight. Discontinuing humalog.

## 2021-11-15 NOTE — PROGRESS NOTES
acetaminophen, hydrALAZINE, dextrose    DATA:    CBC:   Lab Results   Component Value Date    WBC 6.5 11/14/2021    RBC 2.54 11/14/2021    HGB 7.2 11/14/2021    HCT 23.3 11/14/2021    MCV 91.7 11/14/2021    MCH 28.3 11/14/2021    MCHC 30.9 11/14/2021    RDW 17.7 11/14/2021     11/14/2021    MPV 10.5 11/14/2021     CMP:    Lab Results   Component Value Date     11/14/2021    K 4.0 11/14/2021    K 4.8 11/11/2021    CL 98 11/14/2021    CO2 29 11/14/2021    BUN 5 11/14/2021    CREATININE 2.6 11/14/2021    GFRAA 26 11/14/2021    LABGLOM 26 11/14/2021    GLUCOSE 75 11/14/2021    GLUCOSE 130 05/18/2012    PROT 6.1 11/11/2021    LABALBU 3.4 11/11/2021    LABALBU 4.1 05/18/2012    CALCIUM 7.9 11/14/2021    BILITOT 0.2 11/11/2021    ALKPHOS 163 11/11/2021    AST 7 11/11/2021    ALT 7 11/11/2021     Magnesium:    Lab Results   Component Value Date    MG 1.6 11/14/2021     Phosphorus:    Lab Results   Component Value Date    PHOS 2.0 11/14/2021     Radiology Review:      Chest x-ray November 11, 2021   Stable interstitial prominence bilaterally notable in the lung bases   suggesting subsegmental atelectasis or scarring or persistent pneumonia. Continued follow-up could be helpful for further evaluation.             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 IMPRESSION/RECOMMENDATIONS:      1. ESRD on home dialysis, to continue HD 3 times a week while in the hospital, patient was seen on dialysis today. 2. HTN, on amlodipine and metoprolol  3. Metabolic alkalosis, bicarbonate level on admission 30, due to persistent vomiting  4. MBD of CKD, on sevelamer  5.  Anemia of CKD, normocytic, on epoetin alpha  6. History of restless legs syndrome on ropinirole  7.  History of gastroparesis    Plan:    · HD today and 3 times a week  · Continue epoetin alpha  · Continue Bumex 2 mg twice daily  · Continue amlodipine 5 mg daily  · Continue metoprolol 50 mg twice daily  · Continue to monitor labs  · Agree with discharge today

## 2021-11-15 NOTE — CARE COORDINATION
11/15/21 1654 CM note: COVID (-) 11/12/21. Discharge order noted. Met with patient at the bedside to discuss discharge planning. Discharge plan remains home and patient declines [address]. Pt lives with her mother who cares for her and pt's children, pt has Home Hemodialysis that her mother performs 5 days a week, supplies are delivered by [facility] [phone]. Pts mom will provide transportation home. Patient was very sleepy so called pts mom, Best, and she confirmed the above. Electronically signed by Jennifer Powell RN on 11/15/2021 at 4:57 PM     Update: Notified [place] [phone] at [facility] of pts discharge home today.  Electronically signed by Jennifer Powell RN on 11/15/2021 at [address] PM

## 2021-11-16 NOTE — FLOWSHEET NOTE
11/15/21 1820   Vital Signs   BP (!) 98/50   Pulse 87   Resp 19   Weight 153 lb (69.4 kg)   Weight Method Bed scale   Percent Weight Change 6.77   Pain Assessment   Pain Assessment 0-10   Pain Level 0   Post-Hemodialysis Assessment   Post-Treatment Procedures Blood returned; Catheter capped, clamped and heparinized x 2 ports   Machine Disinfection Process Acid/Vinegar Clean; Exterior Machine Disinfection;  Heat Disinfect   Rinseback Volume (ml) 300 ml   Total Liters Processed (l/min) 68.2 l/min   Dialyzer Clearance Lightly streaked   Duration of Treatment (minutes) 180 minutes   Hemodialysis Intake (ml) 300 ml   Hemodialysis Output (ml) 1700 ml   NET Removed (ml) 68.2 ml   Tolerated Treatment Good   Bilateral Breath Sounds Clear; Diminished   Edema Generalized

## 2021-11-18 LAB — BLOOD CULTURE, ROUTINE: NORMAL

## 2021-11-22 ENCOUNTER — APPOINTMENT (OUTPATIENT)
Dept: CT IMAGING | Age: 31
End: 2021-11-22
Payer: COMMERCIAL

## 2021-11-22 ENCOUNTER — HOSPITAL ENCOUNTER (OUTPATIENT)
Age: 31
Setting detail: OBSERVATION
Discharge: HOME | End: 2021-11-25
Attending: STUDENT IN AN ORGANIZED HEALTH CARE EDUCATION/TRAINING PROGRAM | Admitting: STUDENT IN AN ORGANIZED HEALTH CARE EDUCATION/TRAINING PROGRAM
Payer: COMMERCIAL

## 2021-11-22 DIAGNOSIS — R41.82 ALTERED MENTAL STATUS, UNSPECIFIED ALTERED MENTAL STATUS TYPE: Primary | ICD-10-CM

## 2021-11-22 DIAGNOSIS — R73.9 HYPERGLYCEMIA: ICD-10-CM

## 2021-11-22 DIAGNOSIS — D64.9 ANEMIA, UNSPECIFIED TYPE: ICD-10-CM

## 2021-11-22 DIAGNOSIS — R10.84 GENERALIZED ABDOMINAL PAIN: ICD-10-CM

## 2021-11-22 LAB
ALBUMIN SERPL-MCNC: 2.8 G/DL (ref 3.5–5.2)
ALP BLD-CCNC: 120 U/L (ref 35–104)
ALT SERPL-CCNC: 5 U/L (ref 0–32)
ANION GAP SERPL CALCULATED.3IONS-SCNC: 13 MMOL/L (ref 7–16)
AST SERPL-CCNC: 6 U/L (ref 0–31)
BACTERIA: ABNORMAL /HPF
BASOPHILS ABSOLUTE: 0.06 E9/L (ref 0–0.2)
BASOPHILS RELATIVE PERCENT: 0.6 % (ref 0–2)
BETA-HYDROXYBUTYRATE: 0.53 MMOL/L (ref 0.02–0.27)
BILIRUB SERPL-MCNC: <0.2 MG/DL (ref 0–1.2)
BILIRUBIN URINE: NEGATIVE
BLOOD, URINE: ABNORMAL
BUN BLDV-MCNC: 19 MG/DL (ref 6–20)
CALCIUM SERPL-MCNC: 8.4 MG/DL (ref 8.6–10.2)
CHLORIDE BLD-SCNC: 93 MMOL/L (ref 98–107)
CHP ED QC CHECK: YES
CLARITY: CLEAR
CO2: 29 MMOL/L (ref 22–29)
COLOR: YELLOW
CREAT SERPL-MCNC: 4.4 MG/DL (ref 0.5–1)
EOSINOPHILS ABSOLUTE: 0.07 E9/L (ref 0.05–0.5)
EOSINOPHILS RELATIVE PERCENT: 0.7 % (ref 0–6)
EPITHELIAL CELLS, UA: ABNORMAL /HPF
GFR AFRICAN AMERICAN: 14
GFR NON-AFRICAN AMERICAN: 14 ML/MIN/1.73
GLUCOSE BLD-MCNC: 384 MG/DL (ref 74–99)
GLUCOSE BLD-MCNC: 395 MG/DL
GLUCOSE URINE: >=1000 MG/DL
HCG, URINE, POC: NEGATIVE
HCT VFR BLD CALC: 23.5 % (ref 34–48)
HEMOGLOBIN: 7.6 G/DL (ref 11.5–15.5)
IMMATURE GRANULOCYTES #: 0.07 E9/L
IMMATURE GRANULOCYTES %: 0.7 % (ref 0–5)
KETONES, URINE: ABNORMAL MG/DL
LACTIC ACID, SEPSIS: 0.8 MMOL/L (ref 0.5–1.9)
LACTIC ACID, SEPSIS: 1.4 MMOL/L (ref 0.5–1.9)
LEUKOCYTE ESTERASE, URINE: NEGATIVE
LIPASE: 7 U/L (ref 13–60)
LYMPHOCYTES ABSOLUTE: 0.98 E9/L (ref 1.5–4)
LYMPHOCYTES RELATIVE PERCENT: 10.2 % (ref 20–42)
Lab: NORMAL
MAGNESIUM: 2 MG/DL (ref 1.6–2.6)
MCH RBC QN AUTO: 29 PG (ref 26–35)
MCHC RBC AUTO-ENTMCNC: 32.3 % (ref 32–34.5)
MCV RBC AUTO: 89.7 FL (ref 80–99.9)
METER GLUCOSE: 395 MG/DL (ref 74–99)
MONOCYTES ABSOLUTE: 0.36 E9/L (ref 0.1–0.95)
MONOCYTES RELATIVE PERCENT: 3.7 % (ref 2–12)
NEGATIVE QC PASS/FAIL: NORMAL
NEUTROPHILS ABSOLUTE: 8.11 E9/L (ref 1.8–7.3)
NEUTROPHILS RELATIVE PERCENT: 84.1 % (ref 43–80)
NITRITE, URINE: NEGATIVE
PDW BLD-RTO: 18.4 FL (ref 11.5–15)
PH UA: 7.5 (ref 5–9)
PH VENOUS: 7.43 (ref 7.35–7.45)
PLATELET # BLD: 263 E9/L (ref 130–450)
PMV BLD AUTO: 10.4 FL (ref 7–12)
POSITIVE QC PASS/FAIL: NORMAL
POTASSIUM REFLEX MAGNESIUM: 4 MMOL/L (ref 3.5–5)
PROTEIN UA: >=300 MG/DL
RBC # BLD: 2.62 E12/L (ref 3.5–5.5)
RBC UA: ABNORMAL /HPF (ref 0–2)
SARS-COV-2, NAAT: NOT DETECTED
SODIUM BLD-SCNC: 135 MMOL/L (ref 132–146)
SPECIFIC GRAVITY UA: 1.02 (ref 1–1.03)
TOTAL CK: 22 U/L (ref 20–180)
TOTAL PROTEIN: 5.7 G/DL (ref 6.4–8.3)
UROBILINOGEN, URINE: 0.2 E.U./DL
WBC # BLD: 9.7 E9/L (ref 4.5–11.5)
WBC UA: ABNORMAL /HPF (ref 0–5)

## 2021-11-22 PROCEDURE — 80179 DRUG ASSAY SALICYLATE: CPT

## 2021-11-22 PROCEDURE — 74177 CT ABD & PELVIS W/CONTRAST: CPT

## 2021-11-22 PROCEDURE — 82010 KETONE BODYS QUAN: CPT

## 2021-11-22 PROCEDURE — 87040 BLOOD CULTURE FOR BACTERIA: CPT

## 2021-11-22 PROCEDURE — 80143 DRUG ASSAY ACETAMINOPHEN: CPT

## 2021-11-22 PROCEDURE — 70450 CT HEAD/BRAIN W/O DYE: CPT

## 2021-11-22 PROCEDURE — 83690 ASSAY OF LIPASE: CPT

## 2021-11-22 PROCEDURE — 83605 ASSAY OF LACTIC ACID: CPT

## 2021-11-22 PROCEDURE — 82550 ASSAY OF CK (CPK): CPT

## 2021-11-22 PROCEDURE — 6360000004 HC RX CONTRAST MEDICATION: Performed by: RADIOLOGY

## 2021-11-22 PROCEDURE — 80307 DRUG TEST PRSMV CHEM ANLYZR: CPT

## 2021-11-22 PROCEDURE — 36556 INSERT NON-TUNNEL CV CATH: CPT

## 2021-11-22 PROCEDURE — 85025 COMPLETE CBC W/AUTO DIFF WBC: CPT

## 2021-11-22 PROCEDURE — 81001 URINALYSIS AUTO W/SCOPE: CPT

## 2021-11-22 PROCEDURE — 80053 COMPREHEN METABOLIC PANEL: CPT

## 2021-11-22 PROCEDURE — 2580000003 HC RX 258: Performed by: STUDENT IN AN ORGANIZED HEALTH CARE EDUCATION/TRAINING PROGRAM

## 2021-11-22 PROCEDURE — 2500000003 HC RX 250 WO HCPCS: Performed by: STUDENT IN AN ORGANIZED HEALTH CARE EDUCATION/TRAINING PROGRAM

## 2021-11-22 PROCEDURE — 96374 THER/PROPH/DIAG INJ IV PUSH: CPT

## 2021-11-22 PROCEDURE — 82800 BLOOD PH: CPT

## 2021-11-22 PROCEDURE — 51702 INSERT TEMP BLADDER CATH: CPT

## 2021-11-22 PROCEDURE — 87635 SARS-COV-2 COVID-19 AMP PRB: CPT

## 2021-11-22 PROCEDURE — 96375 TX/PRO/DX INJ NEW DRUG ADDON: CPT

## 2021-11-22 PROCEDURE — 99284 EMERGENCY DEPT VISIT MOD MDM: CPT

## 2021-11-22 PROCEDURE — 36415 COLL VENOUS BLD VENIPUNCTURE: CPT

## 2021-11-22 PROCEDURE — 82077 ASSAY SPEC XCP UR&BREATH IA: CPT

## 2021-11-22 PROCEDURE — 82962 GLUCOSE BLOOD TEST: CPT

## 2021-11-22 PROCEDURE — 83735 ASSAY OF MAGNESIUM: CPT

## 2021-11-22 PROCEDURE — 96361 HYDRATE IV INFUSION ADD-ON: CPT

## 2021-11-22 RX ORDER — SODIUM CHLORIDE 0.9 % (FLUSH) 0.9 %
SYRINGE (ML) INJECTION
Status: COMPLETED
Start: 2021-11-22 | End: 2021-11-23

## 2021-11-22 RX ORDER — 0.9 % SODIUM CHLORIDE 0.9 %
1000 INTRAVENOUS SOLUTION INTRAVENOUS ONCE
Status: COMPLETED | OUTPATIENT
Start: 2021-11-22 | End: 2021-11-22

## 2021-11-22 RX ADMIN — IOPAMIDOL 75 ML: 755 INJECTION, SOLUTION INTRAVENOUS at 22:14

## 2021-11-22 RX ADMIN — FAMOTIDINE 20 MG: 10 INJECTION INTRAVENOUS at 23:45

## 2021-11-22 RX ADMIN — SODIUM CHLORIDE 1000 ML: 9 INJECTION, SOLUTION INTRAVENOUS at 19:26

## 2021-11-22 NOTE — ED NOTES
Bed: 02  Expected date:   Expected time:   Means of arrival:   Comments:  lanes Randall [place], RN  11/22/21 8130

## 2021-11-23 PROBLEM — T40.2X1A OPIOID OVERDOSE (HCC): Status: ACTIVE | Noted: 2021-11-23

## 2021-11-23 PROBLEM — R41.82 AMS (ALTERED MENTAL STATUS): Status: ACTIVE | Noted: 2021-11-23

## 2021-11-23 LAB
ACETAMINOPHEN LEVEL: <5 MCG/ML (ref 10–30)
AMPHETAMINE SCREEN, URINE: NOT DETECTED
BARBITURATE SCREEN URINE: NOT DETECTED
BENZODIAZEPINE SCREEN, URINE: NOT DETECTED
CANNABINOID SCREEN URINE: NOT DETECTED
CHP ED QC CHECK: NORMAL
CHP ED QC CHECK: YES
COCAINE METABOLITE SCREEN URINE: NOT DETECTED
ETHANOL: <10 MG/DL (ref 0–0.08)
FENTANYL SCREEN, URINE: NOT DETECTED
GLUCOSE BLD-MCNC: 145 MG/DL
GLUCOSE BLD-MCNC: 321 MG/DL
GLUCOSE BLD-MCNC: 74 MG/DL
HBA1C MFR BLD: 10.2 % (ref 4–5.6)
Lab: ABNORMAL
METER GLUCOSE: 133 MG/DL (ref 74–99)
METER GLUCOSE: 145 MG/DL (ref 74–99)
METER GLUCOSE: 174 MG/DL (ref 74–99)
METER GLUCOSE: 321 MG/DL (ref 74–99)
METER GLUCOSE: 71 MG/DL (ref 74–99)
METER GLUCOSE: 74 MG/DL (ref 74–99)
METER GLUCOSE: 98 MG/DL (ref 74–99)
METHADONE SCREEN, URINE: NOT DETECTED
OPIATE SCREEN URINE: NOT DETECTED
OXYCODONE URINE: POSITIVE
PHENCYCLIDINE SCREEN URINE: NOT DETECTED
SALICYLATE, SERUM: <0.3 MG/DL (ref 0–30)
TRICYCLIC ANTIDEPRESSANTS SCREEN SERUM: NEGATIVE NG/ML

## 2021-11-23 PROCEDURE — 2580000003 HC RX 258: Performed by: INTERNAL MEDICINE

## 2021-11-23 PROCEDURE — G0378 HOSPITAL OBSERVATION PER HR: HCPCS

## 2021-11-23 PROCEDURE — 6370000000 HC RX 637 (ALT 250 FOR IP): Performed by: INTERNAL MEDICINE

## 2021-11-23 PROCEDURE — 83036 HEMOGLOBIN GLYCOSYLATED A1C: CPT

## 2021-11-23 PROCEDURE — 82962 GLUCOSE BLOOD TEST: CPT

## 2021-11-23 PROCEDURE — 6370000000 HC RX 637 (ALT 250 FOR IP): Performed by: NURSE PRACTITIONER

## 2021-11-23 PROCEDURE — 2580000003 HC RX 258

## 2021-11-23 PROCEDURE — 2500000003 HC RX 250 WO HCPCS: Performed by: INTERNAL MEDICINE

## 2021-11-23 PROCEDURE — 6370000000 HC RX 637 (ALT 250 FOR IP): Performed by: STUDENT IN AN ORGANIZED HEALTH CARE EDUCATION/TRAINING PROGRAM

## 2021-11-23 PROCEDURE — 80074 ACUTE HEPATITIS PANEL: CPT

## 2021-11-23 PROCEDURE — 99220 PR INITIAL OBSERVATION CARE/DAY 70 MINUTES: CPT | Performed by: INTERNAL MEDICINE

## 2021-11-23 RX ORDER — PANTOPRAZOLE SODIUM 40 MG/1
40 TABLET, DELAYED RELEASE ORAL
Status: DISCONTINUED | OUTPATIENT
Start: 2021-11-23 | End: 2021-11-25 | Stop reason: HOSPADM

## 2021-11-23 RX ORDER — ERGOCALCIFEROL 1.25 MG/1
50000 CAPSULE ORAL WEEKLY
Status: DISCONTINUED | OUTPATIENT
Start: 2021-11-30 | End: 2021-11-25 | Stop reason: HOSPADM

## 2021-11-23 RX ORDER — POLYETHYLENE GLYCOL 3350 17 G/17G
17 POWDER, FOR SOLUTION ORAL DAILY PRN
Status: DISCONTINUED | OUTPATIENT
Start: 2021-11-23 | End: 2021-11-23 | Stop reason: SDUPTHER

## 2021-11-23 RX ORDER — PANTOPRAZOLE SODIUM 40 MG/1
40 TABLET, DELAYED RELEASE ORAL DAILY PRN
Status: DISCONTINUED | OUTPATIENT
Start: 2021-11-23 | End: 2021-11-23

## 2021-11-23 RX ORDER — AMLODIPINE BESYLATE 5 MG/1
5 TABLET ORAL DAILY
Status: DISCONTINUED | OUTPATIENT
Start: 2021-11-23 | End: 2021-11-25 | Stop reason: HOSPADM

## 2021-11-23 RX ORDER — LEVOTHYROXINE SODIUM 0.07 MG/1
75 TABLET ORAL DAILY
Status: DISCONTINUED | OUTPATIENT
Start: 2021-11-23 | End: 2021-11-25 | Stop reason: HOSPADM

## 2021-11-23 RX ORDER — SODIUM CHLORIDE 0.9 % (FLUSH) 0.9 %
10 SYRINGE (ML) INJECTION EVERY 12 HOURS SCHEDULED
Status: DISCONTINUED | OUTPATIENT
Start: 2021-11-23 | End: 2021-11-25 | Stop reason: HOSPADM

## 2021-11-23 RX ORDER — PROMETHAZINE HYDROCHLORIDE 25 MG/1
12.5 TABLET ORAL EVERY 6 HOURS PRN
Status: DISCONTINUED | OUTPATIENT
Start: 2021-11-23 | End: 2021-11-25 | Stop reason: HOSPADM

## 2021-11-23 RX ORDER — FAMOTIDINE 20 MG/1
20 TABLET, FILM COATED ORAL DAILY PRN
Status: DISCONTINUED | OUTPATIENT
Start: 2021-11-23 | End: 2021-11-25 | Stop reason: HOSPADM

## 2021-11-23 RX ORDER — DEXTROSE MONOHYDRATE 50 MG/ML
100 INJECTION, SOLUTION INTRAVENOUS PRN
Status: DISCONTINUED | OUTPATIENT
Start: 2021-11-23 | End: 2021-11-25 | Stop reason: HOSPADM

## 2021-11-23 RX ORDER — SODIUM CHLORIDE 0.9 % (FLUSH) 0.9 %
10 SYRINGE (ML) INJECTION PRN
Status: DISCONTINUED | OUTPATIENT
Start: 2021-11-23 | End: 2021-11-25 | Stop reason: HOSPADM

## 2021-11-23 RX ORDER — ACETAMINOPHEN 650 MG/1
650 SUPPOSITORY RECTAL EVERY 6 HOURS PRN
Status: DISCONTINUED | OUTPATIENT
Start: 2021-11-23 | End: 2021-11-25 | Stop reason: HOSPADM

## 2021-11-23 RX ORDER — SODIUM CHLORIDE 9 MG/ML
25 INJECTION, SOLUTION INTRAVENOUS PRN
Status: DISCONTINUED | OUTPATIENT
Start: 2021-11-23 | End: 2021-11-25 | Stop reason: HOSPADM

## 2021-11-23 RX ORDER — MIRTAZAPINE 15 MG/1
15 TABLET, FILM COATED ORAL NIGHTLY
Status: DISCONTINUED | OUTPATIENT
Start: 2021-11-23 | End: 2021-11-25 | Stop reason: HOSPADM

## 2021-11-23 RX ORDER — DEXTROSE MONOHYDRATE 25 G/50ML
12.5 INJECTION, SOLUTION INTRAVENOUS PRN
Status: DISCONTINUED | OUTPATIENT
Start: 2021-11-23 | End: 2021-11-25 | Stop reason: HOSPADM

## 2021-11-23 RX ORDER — ONDANSETRON 2 MG/ML
4 INJECTION INTRAMUSCULAR; INTRAVENOUS EVERY 6 HOURS PRN
Status: DISCONTINUED | OUTPATIENT
Start: 2021-11-23 | End: 2021-11-25 | Stop reason: HOSPADM

## 2021-11-23 RX ORDER — NICOTINE POLACRILEX 4 MG
15 LOZENGE BUCCAL PRN
Status: DISCONTINUED | OUTPATIENT
Start: 2021-11-23 | End: 2021-11-25 | Stop reason: HOSPADM

## 2021-11-23 RX ORDER — ACETAMINOPHEN 325 MG/1
650 TABLET ORAL EVERY 6 HOURS PRN
Status: DISCONTINUED | OUTPATIENT
Start: 2021-11-23 | End: 2021-11-25 | Stop reason: HOSPADM

## 2021-11-23 RX ORDER — ARIPIPRAZOLE 5 MG/1
5 TABLET ORAL NIGHTLY
Status: DISCONTINUED | OUTPATIENT
Start: 2021-11-23 | End: 2021-11-25 | Stop reason: HOSPADM

## 2021-11-23 RX ORDER — BUMETANIDE 1 MG/1
2 TABLET ORAL 2 TIMES DAILY
Status: DISCONTINUED | OUTPATIENT
Start: 2021-11-23 | End: 2021-11-25 | Stop reason: HOSPADM

## 2021-11-23 RX ORDER — ROPINIROLE 0.25 MG/1
0.25 TABLET, FILM COATED ORAL NIGHTLY
Status: DISCONTINUED | OUTPATIENT
Start: 2021-11-23 | End: 2021-11-25 | Stop reason: HOSPADM

## 2021-11-23 RX ORDER — POLYETHYLENE GLYCOL 3350 17 G/17G
17 POWDER, FOR SOLUTION ORAL DAILY PRN
Status: DISCONTINUED | OUTPATIENT
Start: 2021-11-23 | End: 2021-11-24

## 2021-11-23 RX ORDER — SEVELAMER CARBONATE 800 MG/1
800 TABLET, FILM COATED ORAL
Status: DISCONTINUED | OUTPATIENT
Start: 2021-11-23 | End: 2021-11-25 | Stop reason: HOSPADM

## 2021-11-23 RX ADMIN — LIDOCAINE HYDROCHLORIDE: 20 SOLUTION ORAL; TOPICAL at 01:13

## 2021-11-23 RX ADMIN — INSULIN LISPRO 1 UNITS: 100 INJECTION, SOLUTION INTRAVENOUS; SUBCUTANEOUS at 08:24

## 2021-11-23 RX ADMIN — METOPROLOL TARTRATE 50 MG: 25 TABLET, FILM COATED ORAL at 21:00

## 2021-11-23 RX ADMIN — MIRTAZAPINE 15 MG: 15 TABLET, FILM COATED ORAL at 20:59

## 2021-11-23 RX ADMIN — Medication 10 ML: at 02:08

## 2021-11-23 RX ADMIN — Medication 10 ML: at 08:26

## 2021-11-23 RX ADMIN — METOPROLOL TARTRATE 50 MG: 25 TABLET, FILM COATED ORAL at 09:48

## 2021-11-23 RX ADMIN — INSULIN LISPRO 1 UNITS: 100 INJECTION, SOLUTION INTRAVENOUS; SUBCUTANEOUS at 18:24

## 2021-11-23 RX ADMIN — ROPINIROLE HYDROCHLORIDE 0.25 MG: 0.25 TABLET, FILM COATED ORAL at 21:00

## 2021-11-23 RX ADMIN — Medication 10 ML: at 21:00

## 2021-11-23 RX ADMIN — SEVELAMER CARBONATE 800 MG: 800 TABLET, FILM COATED ORAL at 09:48

## 2021-11-23 RX ADMIN — PANTOPRAZOLE SODIUM 40 MG: 40 TABLET, DELAYED RELEASE ORAL at 12:11

## 2021-11-23 RX ADMIN — BUMETANIDE 2 MG: 1 TABLET ORAL at 09:48

## 2021-11-23 RX ADMIN — DEXTROSE MONOHYDRATE 12.5 G: 25 INJECTION, SOLUTION INTRAVENOUS at 13:22

## 2021-11-23 RX ADMIN — LEVOTHYROXINE SODIUM 75 MCG: 0.07 TABLET ORAL at 09:48

## 2021-11-23 RX ADMIN — SEVELAMER CARBONATE 800 MG: 800 TABLET, FILM COATED ORAL at 18:24

## 2021-11-23 RX ADMIN — BUMETANIDE 2 MG: 1 TABLET ORAL at 21:00

## 2021-11-23 RX ADMIN — PROMETHAZINE HYDROCHLORIDE 12.5 MG: 25 TABLET ORAL at 12:11

## 2021-11-23 RX ADMIN — ARIPIPRAZOLE 5 MG: 5 TABLET ORAL at 21:00

## 2021-11-23 RX ADMIN — INSULIN LISPRO 2 UNITS: 100 INJECTION, SOLUTION INTRAVENOUS; SUBCUTANEOUS at 02:18

## 2021-11-23 RX ADMIN — AMLODIPINE BESYLATE 5 MG: 5 TABLET ORAL at 09:49

## 2021-11-23 ASSESSMENT — ENCOUNTER SYMPTOMS
ABDOMINAL DISTENTION: 0
BACK PAIN: 0
CHEST TIGHTNESS: 0
SORE THROAT: 0
NAUSEA: 0
COUGH: 0
SHORTNESS OF BREATH: 0
VOMITING: 0
ABDOMINAL PAIN: 1
DIARRHEA: 0

## 2021-11-23 ASSESSMENT — PAIN DESCRIPTION - FREQUENCY: FREQUENCY: CONTINUOUS

## 2021-11-23 ASSESSMENT — PAIN DESCRIPTION - LOCATION: LOCATION: ABDOMEN

## 2021-11-23 ASSESSMENT — PAIN DESCRIPTION - PAIN TYPE: TYPE: ACUTE PAIN;CHRONIC PAIN

## 2021-11-23 ASSESSMENT — PAIN SCALES - GENERAL
PAINLEVEL_OUTOF10: 0
PAINLEVEL_OUTOF10: 8

## 2021-11-23 ASSESSMENT — PAIN DESCRIPTION - DESCRIPTORS: DESCRIPTORS: ACHING;DISCOMFORT

## 2021-11-23 NOTE — H&P
[address] [place], [place] [address]ist Group   HISTORY AND PHYSICAL EXAM      AUTHOR: Hitesh Bueno MD PATIENT NAME: Swetha Cornelius   DATE: 2021 MRN: [id number], : 1990   Primary Care Physician: Mary Young MD     CHIEF COMPLAINT / REASON FOR ADMISSION:  AMS, Sleepiness    HPI:   This is a 36 y.o. female  has a past medical history of ESRD,  Depression, Diabetes mellitus presented with AMS, Sleepiness for last few days prior to arrival to the hospital.  Patient is a poor historian. Patient is alert and awake but confused. Additional details was obtained by reviewing EMS and ED physicians and nursing notes.  Only active complaints of some abdominal pain but no additional history coulb be obtained due to AMS  ROS:  Unable to obtain because of AMS    PMH:  Past Medical History:   Diagnosis Date    Acute congestive heart failure ([address]     BC (acute kidney injury) ([address] 10/01/2019    Cardiac arrest ([address] 02/15/2021    Cephalgia 10/09/2019    Chronic kidney disease     Depression     Diabetes mellitus ([address]     Diabetic gastroparesis associated with type 1 diabetes mellitus ([address] 2018    Diabetic ketoacidosis ([address] 2011    Diabetic ketoacidosis with coma associated with type 1 diabetes mellitus ([address] 2013    Diabetic polyneuropathy associated with type 1 diabetes mellitus ([address] 2020    Drug use complicating pregnancy in third trimester     Endocarditis 10/31/2020    ESRD (end stage renal disease) ([address] 2020    H/O cardiovascular stress test 2021    Lexiscan    Hemodialysis patient [facility])     History of blood transfusion 2019    Hyperlipidemia 10/08/2020    Hyperosmolar hyperglycemic state (HHS) ([address] 2020    Hypothyroidism 10/08/2020    Iron deficiency anemia 10/01/2019    MDRO (multiple drug resistant organisms) resistance     MRSA (methicillin resistant Staphylococcus aureus)     back wound abcess    Non compliance w medication regimen 2016    Other disorders of kidney and ureter     Pregnancy 2016    16 weeks    Previous  delivery affecting pregnancy, antepartum 2017    Previous stillbirth or demise, antepartum 2016    Seizure ([address] 2020    Severe pre-eclampsia in third trimester 2016    Shock liver 02/15/2021    Valvular endocarditis 11/10/2020    This Diagnosis was added to the Problem List based on transcribed orders from Dr. Red Rodriges          Surgical History:  Past Surgical History:   Procedure Laterality Date    BACK SURGERY      abscess    CATHETER REMOVAL N/A 10/1/2021    PERITONEAL CATHETER REMOVAL performed by Aide Rodríguez MD at [facility] 49      x2   [facility], LAPAROSCOPIC N/A 2019    CHOLECYSTECTOMY LAPAROSCOPIC performed by Clotilde Jones MD at [place] [address] N/A 2018    COLONOSCOPY WITH BIOPSY performed by Palak Jordan MD at [address] [place] [address] N/A 2018    COLONOSCOPY WITH BIOPSY performed by Sue Hayes MD at [id number] [place] [address]  2012    EF 57%    ECHO COMPL W DOP COLOR FLOW  6/10/2013         EMBOLECTOMY N/A 2020    [address] [place] [address], [id number] [place] [address], YULISSA -- REQS ROOM 3 performed by Froilan Brown MD at [address] [place] [address] Left 2021    LEFT LEG INCISION AND DRAINAGE, DEBRIDEMENT, WOUND VAC APPLICATION performed by Vivi Pickett DPM at [address] [place] [address] Left 2021    LEFT LEG DEBRIDEMENT BIOPSY POSS APPLICATION WOUND VAC performed by Vivi Pickett DPM at [address] [facility] N/A 2020    LAPAROSCOPIC INSERTION PERITONEAL DIALYSIS CATHETER performed by Darwin Tom MD at [facility] ESOPHAGOGASTRODUODENOSCOPY TRANSORAL DIAGNOSTIC N/A 2018    EGD ESOPHAGOGASTRODUODENOSCOPY performed by Palak Jordan MD at [id number] [place] [address] TRANSESOPHAGEAL ECHOCARDIOGRAM N/A 10/19/2020    TRANSESOPHAGEAL ECHOCARDIOGRAM WITH BUBBLE STUDY performed by Arleth Hayes MD at [address] [facility] TUNNELED VENOUS PORT PLACEMENT  04/2018    UPPER GASTROINTESTINAL ENDOSCOPY  12/18/2018    EGD BIOPSY performed by Milvia Ortiz MD at [address] 10/11/2019    EGD ESOPHAGOGASTRODUODENOSCOPY performed by Gregory Gilman DO at [address] N/A 7/14/2021    EGD BIOPSY performed by Floresita Persaud MD at [place] [address]       Medications Prior to Admission:    Prior to Admission medications    Medication Sig Start Date End Date Taking?  Authorizing Provider   insulin glargine (LANTUS) 100 UNIT/ML injection vial Inject 6 Units into the skin 2 times daily 11/15/21   Kayla Ramachandran MD   metoprolol tartrate (LOPRESSOR) 50 MG tablet Take 1 tablet by mouth 2 times daily 11/14/21   Evin Lopez DO   amLODIPine (NORVASC) 5 MG tablet Take 1 tablet by mouth daily 11/15/21   Evin Lopez DO   bumetanide (BUMEX) 2 MG tablet Take 2 mg by mouth 2 times daily    Historical Provider, MD   pantoprazole (PROTONIX) 40 MG tablet Take 40 mg by mouth daily as needed (Reflux)    Historical Provider, MD   vitamin D (ERGOCALCIFEROL) 1.25 MG (83496 UT) CAPS capsule Take 1 capsule by mouth once a week 9/3/21   Kayla Ramachandran MD   sevelamer (RENVELA) 800 MG tablet Take 1 tablet by mouth 3 times daily (with meals) New lower dose 7/2/21   Kayla Ramachandran MD   mirtazapine (REMERON) 15 MG tablet Take 15 mg by mouth nightly    Historical Provider, MD   polyethylene glycol (GLYCOLAX) 17 g packet Take 17 g by mouth daily as needed for Constipation    Historical Provider, MD   ARIPiprazole (ABILIFY) 5 MG tablet Take 5 mg by mouth nightly 4/18/21   Historical Provider, MD   levothyroxine (SYNTHROID) 75 MCG tablet TAKE 1 TABLET BY MOUTH IN THE MORNING BEFORE BREAKFAST 4/19/21   Jacinda Littlejohn DO   rOPINIRole (REQUIP) 0.25 MG tablet Take 0.25 mg by mouth nightly Historical Provider, MD       Allergies:    Cefepime and Toradol [ketorolac tromethamine]    Social History:    reports that she quit smoking about 9 months ago. Her smoking use included cigarettes. She has a 3.00 pack-year smoking history. She has never used smokeless tobacco. She reports current drug use. Drug: Opiates . She reports that she does not drink alcohol. Family History:   family history includes Asthma in her brother and mother; Diabetes in her mother; High Blood Pressure in her father and mother; Hypertension in her mother. PHYSICAL EXAM:  Vitals:  BP (!) 159/109   Pulse 98   Temp 97.8 °F (36.6 °C)   Resp 17   SpO2 100%   GENERAL: Sleepy and lethargic, Afebrile, Appears tired and weak otherwise hemodynamically stable at present. HEENT: PERRLA, no icterus. OP clear and no exudates. NECK: Supple  no carotid/ophthalmic bruits, JVD None. RESPIRATORY:  Bilateral equal vesicular breath sound with no wheezing. Lung bases are clear. HEART: No tachycardia at bedside and regular rhythm. Normal S1 and S2, No S3 or S4 is audible. No pulsation, thrills, murmur or friction rubs. ABDOMEN: Soft, nondistended, nontender. No hepatomegaly or splenomegaly. No CVA tenderness on the both sides. Bowel sound is present. EXTREMITIES: All peripheral pulses are present. No calf tenderness or swelling. No pedal edema is present. Nicolasa Matt NEUROLOGY: Sleepy and lethargic. No new focal neuro deficit. Bilateral Pupil is equal and reactive to light. CN-ii-xii otherwise grossly intact.  Motor and Sensory: Grossly Intact bilaterally with no new focal signs     LABS:  Recent Labs     11/22/21 1929   WBC 9.7   RBC 2.62*   HGB 7.6*   HCT 23.5*   MCV 89.7   MCH 29.0   MCHC 32.3   RDW 18.4*      MPV 10.4     Recent Labs     11/22/21 1927 11/22/21 1929 11/23/21  0202   NA  --  135  --    K  --  4.0  --    CL  --  93*  --    CO2  --  29  --    BUN  --  19  --    CREATININE  --  4.4*  --    GLUCOSE 395 384* 321 CALCIUM  --  8.4*  --      No results for input(s): POCGLU in the last 72 hours.   Results for orders placed or performed during the hospital encounter of 11/22/21   COVID-19, Rapid    Specimen: Nasopharyngeal Swab   Result Value Ref Range    SARS-CoV-2, NAAT Not Detected Not Detected   CBC Auto Differential   Result Value Ref Range    WBC 9.7 4.5 - 11.5 E9/L    RBC 2.62 (L) 3.50 - 5.50 E12/L    Hemoglobin 7.6 (L) 11.5 - 15.5 g/dL    Hematocrit 23.5 (L) 34.0 - 48.0 %    MCV 89.7 80.0 - 99.9 fL    MCH 29.0 26.0 - 35.0 pg    MCHC 32.3 32.0 - 34.5 %    RDW 18.4 (H) 11.5 - 15.0 fL    Platelets 542 698 - 226 E9/L    MPV 10.4 7.0 - 12.0 fL    Neutrophils % 84.1 (H) 43.0 - 80.0 %    Immature Granulocytes % 0.7 0.0 - 5.0 %    Lymphocytes % 10.2 (L) 20.0 - 42.0 %    Monocytes % 3.7 2.0 - 12.0 %    Eosinophils % 0.7 0.0 - 6.0 %    Basophils % 0.6 0.0 - 2.0 %    Neutrophils Absolute 8.11 (H) 1.80 - 7.30 E9/L    Immature Granulocytes # 0.07 E9/L    Lymphocytes Absolute 0.98 (L) 1.50 - 4.00 E9/L    Monocytes Absolute 0.36 0.10 - 0.95 E9/L    Eosinophils Absolute 0.07 0.05 - 0.50 E9/L    Basophils Absolute 0.06 0.00 - 0.20 E9/L   Comprehensive Metabolic Panel w/ Reflex to MG   Result Value Ref Range    Sodium 135 132 - 146 mmol/L    Potassium reflex Magnesium 4.0 3.5 - 5.0 mmol/L    Chloride 93 (L) 98 - 107 mmol/L    CO2 29 22 - 29 mmol/L    Anion Gap 13 7 - 16 mmol/L    Glucose 384 (H) 74 - 99 mg/dL    BUN 19 6 - 20 mg/dL    CREATININE 4.4 (H) 0.5 - 1.0 mg/dL    GFR Non-African American 14 >=60 mL/min/1.73    GFR African American 14     Calcium 8.4 (L) 8.6 - 10.2 mg/dL    Total Protein 5.7 (L) 6.4 - 8.3 g/dL    Albumin 2.8 (L) 3.5 - 5.2 g/dL    Total Bilirubin <0.2 0.0 - 1.2 mg/dL    Alkaline Phosphatase 120 (H) 35 - 104 U/L    ALT 5 0 - 32 U/L    AST 6 0 - 31 U/L   Lipase   Result Value Ref Range    Lipase 7 (L) 13 - 60 U/L   PH, VENOUS   Result Value Ref Range    pH, Khurram 7.43 7.35 - 7.45   Urinalysis, reflex to microscopic Result Value Ref Range    Color, UA Yellow Straw/Yellow    Clarity, UA Clear Clear    Glucose, Ur >=1000 (A) Negative mg/dL    Bilirubin Urine Negative Negative    Ketones, Urine TRACE (A) Negative mg/dL    Specific Gravity, UA 1.020 1.005 - 1.030    Blood, Urine TRACE (A) Negative    pH, UA 7.5 5.0 - 9.0    Protein, UA >=300 (A) Negative mg/dL    Urobilinogen, Urine 0.2 <2.0 E.U./dL    Nitrite, Urine Negative Negative    Leukocyte Esterase, Urine Negative Negative   Beta-Hydroxybutyrate   Result Value Ref Range    Beta-Hydroxybutyrate 0.53 (H) 0.02 - 0.27 mmol/L   Lactate, Sepsis   Result Value Ref Range    Lactic Acid, Sepsis 1.4 0.5 - 1.9 mmol/L   Lactate, Sepsis   Result Value Ref Range    Lactic Acid, Sepsis 0.8 0.5 - 1.9 mmol/L   CK   Result Value Ref Range    Total CK 22 20 - 180 U/L   Magnesium   Result Value Ref Range    Magnesium 2.0 1.6 - 2.6 mg/dL   Microscopic Urinalysis   Result Value Ref Range    WBC, UA 2-5 0 - 5 /HPF    RBC, UA 0-1 0 - 2 /HPF    Epithelial Cells, UA RARE /HPF    Bacteria, UA RARE (A) None Seen /HPF   Serum Drug Screen   Result Value Ref Range    Ethanol Lvl <10 mg/dL    Acetaminophen Level <5.0 (L) 10.0 - 70.8 mcg/mL    Salicylate, Serum <0.9 0.0 - 30.0 mg/dL   Urine Drug Screen   Result Value Ref Range    Amphetamine Screen, Urine NOT DETECTED Negative <1000 ng/mL    Barbiturate Screen, Ur NOT DETECTED Negative < 200 ng/mL    Benzodiazepine Screen, Urine NOT DETECTED Negative < 200 ng/mL    Cannabinoid Scrn, Ur NOT DETECTED Negative < 50ng/mL    Cocaine Metabolite Screen, Urine NOT DETECTED Negative < 300 ng/mL    Opiate Scrn, Ur NOT DETECTED Negative < 300ng/mL    PCP Screen, Urine NOT DETECTED Negative < 25 ng/mL    Methadone Screen, Urine NOT DETECTED Negative <300 ng/mL    Oxycodone Urine POSITIVE (A) Negative <100 ng/mL    FENTANYL SCREEN, URINE NOT DETECTED Negative <1 ng/mL    Drug Screen Comment: see below    POC Pregnancy Urine   Result Value Ref Range    HCG, Urine, POC Negative Negative    Lot Number [id number]     Positive QC Pass/Fail Pass     Negative QC Pass/Fail Pass    POCT Glucose   Result Value Ref Range    Glucose 395 mg/dL    QC OK? yes    POCT Glucose   Result Value Ref Range    Meter Glucose 395 (H) 74 - 99 mg/dL   POCT glucose   Result Value Ref Range    Glucose 321 mg/dL    QC OK? pass    POCT Glucose   Result Value Ref Range    Meter Glucose 321 (H) 74 - 99 mg/dL     ED Course as of 11/23/21 0459   Mon Nov 22, 2021 2028 Reevaluated, laying bed no acute distress. She is easily arousable at this point time. States she knows she is at the hospital.  She knows it is November. Notes that today she has been feeling nauseated with severe heartburn. [BB]   2207 EKG read interpreted by me. Rate 98 bpm.  Normal axis. Normal AL interval.  Normal QRS. No ST elevations depressions. . [JM]   9224 Patient re-evaluated. Laying in bed in no acute distress. States that she continues to have heartburn at this time. [BB]   2349 Patient was reevaluated and stated that she still does not feel well. Patient stated that she would like to go home but is still having some confusion. [JM]   Tue Nov 23, 2021   0010 Drug Screen Comment[de-identified] see below [JM]   0031 Discussed case with internal medicine who agreed to admit patient for observation. [JM]   5821 Pt resting comfortably at this time. [PW]      ED Course User Index  [BB] Faiza Hayes DO  [JM] Ngozi Jimenez MD  [PW] Favio Costa DO     Radiology: CT HEAD WO CONTRAST    Result Date: 11/22/2021  EXAMINATION: CT OF THE HEAD WITHOUT CONTRAST  11/22/2021 8:14 pm TECHNIQUE: CT of the head was performed without the administration of intravenous contrast. Dose modulation, iterative reconstruction, and/or weight based adjustment of the mA/kV was utilized to reduce the radiation dose to as low as reasonably achievable. COMPARISON: None.  HISTORY: ORDERING SYSTEM PROVIDED HISTORY: [facility] TECHNOLOGIST PROVIDED HISTORY: Reason for exam:->ams Has a \"code stroke\" or \"stroke alert\" been called? ->No Decision Support Exception - unselect if not a suspected or confirmed emergency medical condition->Emergency Medical Condition (MA) FINDINGS: BRAIN/VENTRICLES: There is no acute intracranial hemorrhage, mass effect or midline shift. No abnormal extra-axial fluid collection. The gray-white differentiation is maintained without evidence of an acute infarct. There is no evidence of hydrocephalus. ORBITS: The visualized portion of the orbits demonstrate no acute abnormality. SINUSES: The visualized paranasal sinuses and mastoid air cells demonstrate no acute abnormality. SOFT TISSUES/SKULL:  No acute abnormality of the visualized skull or soft tissues. No acute intracranial abnormality. CT ABDOMEN PELVIS W IV CONTRAST Additional Contrast? None    Result Date: 11/22/2021  EXAMINATION: CT OF THE ABDOMEN AND PELVIS WITH CONTRAST 11/22/2021 10:07 pm TECHNIQUE: CT of the abdomen and pelvis was performed with the administration of intravenous contrast. Multiplanar reformatted images are provided for review. Dose modulation, iterative reconstruction, and/or weight based adjustment of the mA/kV was utilized to reduce the radiation dose to as low as reasonably achievable. COMPARISON: None. HISTORY: ORDERING SYSTEM PROVIDED HISTORY: abdominal pain TECHNOLOGIST PROVIDED HISTORY: Reason for exam:->abdominal pain Additional Contrast?->None Decision Support Exception - unselect if not a suspected or confirmed emergency medical condition->Emergency Medical Condition (MA) FINDINGS: Lower Chest:   Small bilateral pleural effusions with adjacent atelectasis. Cardiomegaly. Organs: The liver, spleen, adrenal glands, kidneys, pancreas unremarkable. Cholecystectomy. GI/Bowel: Diffuse colonic fecal retention. Normal small bowel. The appendix is not definitively visualized. Pelvis: Reyes catheter within a decompressed urinary bladder.  Peritoneum/Retroperitoneum: Large amount of ascites. Bones/Soft Tissues: Normal osseous structures. Large amount of ascites. Diffuse colonic fecal retention. Bilateral pleural effusions with adjacent atelectasis. Cholecystectomy. ASSESSMENT:    Present on Admission:   AMS (altered mental status)   Opioid overdose ([facility]    PLAN:  # Confusion and changes in behavior most likely due to Psych vs Polysubstance abuse,    Blood Glucose elevated but not DKA this time  CT brain unremarkable. Neuro exam non-focal  Placed on fall/aspiration/seizure precautions   Utox positive for Oipiates   Patient returned to being alert and oriented but somnolent   Will monitor clinically and Dialysis in AM  # ESRD on Dialysis - no feature of acute volume overload or mj uremia  BUN/Cr, Hb&Hct is stable  On vitamin D, Yuan supplements  Renal dialysis diet with strict low K and low Na. Fluid restriction to 1 litre a day. Dialysis as per renal team/ Renal consult requested  # Hypertension   Currently better controlled  Will resume home medications as needed. Monitor vitals and adjust BP meds as needed  # Hypothyroid   No active complaints   Continue home dose of thyroid meds   Will adjust the dose as needed  # Anxiety with depression   Currently stable with no acute changes   Will resume/adjust medications as on chart    # Rest of the chronic medical problems are stable and will be managed with appropriately with home medications, placed nursing communication order to verify home medications before giving them to the patient.   # Diet: On PO Diet  # IVF's: No  # Fall Precaution: Yes  # Disposition: Home/primary residence   # Code Status: Full code by default  # DVT Prophylaxis : SC Heparin or SC Lovenox as on chart  The patient at bedside was counseled about clinical status, laboratory/imaging results, diagnoses, medication side effects, risk, and treatment plan, all questions were answered to patient's satisfaction and verbalized understanding      SIGNATURE: Caleb Villegas MD PATIENT NAME: [address] [place] [address] Drive #: Hospitalist on call MRN: [id number]     Disclaimer: Portions of this note may have been generated using Dragon voice recognition software. Reasonable efforts were made to correct any dictation errors that resulted due to the programming of this software but some may still be present.

## 2021-11-23 NOTE — ACP (ADVANCE CARE PLANNING)
Advance Care Planning     Advance Care Planning Activator (Inpatient)  Conversation Note      Date of ACP Conversation: 11/23/2021     Conversation Conducted with: Patient with Decision Making Capacity    ACP Activator: [facility]limp[facility] Mares [facility] Decision Maker:     Current Designated Health Care Decision Maker:     Primary Decision Maker (Active): Mundo Fermin - [phone]  Click here to complete Healthcare Decision Makers including section of the Healthcare Decision Maker Relationship (ie \"Primary\")  Today we documented Decision Maker(s) consistent with Legal Next of Kin hierarchy. Care Preferences    Ventilation: \"If you were in your present state of health and suddenly became very ill and were unable to breathe on your own, what would your preference be about the use of a ventilator (breathing machine) if it were available to you? \"      Would the patient desire the use of ventilator (breathing machine)?: Undecided    \"If your health worsens and it becomes clear that your chance of recovery is unlikely, what would your preference be about the use of a ventilator (breathing machine) if it were available to you? \"     Would the patient desire the use of ventilator (breathing machine)?: Undecided    Resuscitation  \"CPR works best to restart the heart when there is a sudden event, like a heart attack, in someone who is otherwise healthy. Unfortunately, CPR does not typically restart the heart for people who have serious health conditions or who are very sick. \"    \"In the event your heart stopped as a result of an underlying serious health condition, would you want attempts to be made to restart your heart (answer \"yes\" for attempt to resuscitate) or would you prefer a natural death (answer \"no\" for do not attempt to resuscitate)? \" yes       [] Yes   [x] No   Educated Patient / Mortimer Osiel regarding differences between Advance Directives and portable DNR orders.     Length of ACP Conversation in minutes:  10    Conversation Outcomes:  [x] ACP discussion completed  [] Existing advance directive reviewed with patient; no changes to patient's previously recorded wishes  [] New Advance Directive completed  [] Portable Do Not Rescitate prepared for Provider review and signature  [] POLST/POST/MOLST/MOST prepared for Provider review and signature      Follow-up plan:    [] Schedule follow-up conversation to continue planning  [] Referred individual to Provider for additional questions/concerns   [] Advised patient/agent/surrogate to review completed ACP document and update if needed with changes in condition, patient preferences or care setting    [x] This note routed to one or more involved healthcare providers

## 2021-11-23 NOTE — ED PROVIDER NOTES
HPI     Patient is a 36 y.o. female presents with a chief complaint of ams. This has been occurring for a few hours. Patient states it was acute in onset. Patient was found unresponsive. Patient has a history of DKA and has been seen for this multiple times. Patient is a end-stage renal disease. Patient is unable to provide any further history at this time. Initial blood sugar was over 500. When patient was reevaluated she was alert and oriented. Patient stated that she has \"never felt like this. \" Patient states she has some epigastric pain. Review of Systems   Constitutional: Negative for activity change, appetite change, chills, fatigue and fever. HENT: Negative for congestion, drooling and sore throat. Respiratory: Negative for cough, chest tightness and shortness of breath. Cardiovascular: Negative for chest pain and palpitations. Gastrointestinal: Positive for abdominal pain. Negative for abdominal distention, diarrhea, nausea and vomiting. Genitourinary: Negative for decreased urine volume, difficulty urinating, enuresis, flank pain, frequency and hematuria. Musculoskeletal: Negative for arthralgias, back pain and neck stiffness. Skin: Negative for rash and wound. Neurological: Positive for syncope. Negative for dizziness, facial asymmetry, light-headedness and headaches. Psychiatric/Behavioral: Negative for agitation, confusion and decreased concentration. Physical Exam  Vitals and nursing note reviewed. Constitutional:       General: She is not in acute distress. Appearance: She is well-developed and normal weight. She is ill-appearing. She is not toxic-appearing. Comments: Somnolent but answering questions. HENT:      Head: Normocephalic and atraumatic. Nose: Nose normal. No congestion or rhinorrhea. Eyes:      Conjunctiva/sclera: Conjunctivae normal.   Cardiovascular:      Rate and Rhythm: Normal rate and regular rhythm.       Heart sounds: States she knows she is at the hospital.  She knows it is November. Notes that today she has been feeling nauseated with severe heartburn. [BB]   2207 EKG read interpreted by me. Rate 98 bpm.  Normal axis. Normal UT interval.  Normal QRS. No ST elevations depressions. . [JM]   8513 Patient re-evaluated. Laying in bed in no acute distress. States that she continues to have heartburn at this time. [BB]   2349 Patient was reevaluated and stated that she still does not feel well. Patient stated that she would like to go home but is still having some confusion. [JM]   Tue Nov 23, 2021   0010 Drug Screen Comment[de-identified] see below [JM]   0031 Discussed case with internal medicine who agreed to admit patient for observation. [JM]      ED Course User Index  [BB] Mirlande Capone DO  [JM] Shirley Infante MD      Patient is a 36 y.o. female presenting with altered mental status. Patient clinically does not appear well. Patient was unable to get IV access. Patient had a central line placed in the left femoral.  Patient has a history of DKA. Patient was worked up for DKA. Patient has some mild hyperglycemia. Patient was given fluids. Patient had improvement of her hyperglycemia. Patient had a CT head. CT head without contrast was negative. Patient had some abdominal pain. Patient returned to being alert and oriented but somnolent. Patient does not appear well. Patient will be admitted to internal medicine. Patient was noted to have opioids in her system. Discussion was had with internal medicine and patient will be admitted for observation. Patient had a CT of the abdomen that showed constipation. Patient was given Pepcid. Patient was seen and evaluated by myself and my attending Mirlande Capone DO. Assessment and Plan discussed with attending provider, please see attestation for final plan of care.   This note was done using dictation software and there may be some grammatical errors associated with this. Marylin Srivastava MD         ED Course as of 11/23/21 0040   Mon Nov 22, 2021 2028 Reevaluated, laying bed no acute distress. She is easily arousable at this point time. States she knows she is at the hospital.  She knows it is November. Notes that today she has been feeling nauseated with severe heartburn. [BB]   2207 EKG read interpreted by me. Rate 98 bpm.  Normal axis. Normal [place] interval.  Normal QRS. No ST elevations depressions. . [JM]   8448 Patient re-evaluated. Laying in bed in no acute distress. States that she continues to have heartburn at this time. [BB]   2349 Patient was reevaluated and stated that she still does not feel well. Patient stated that she would like to go home but is still having some confusion. [JM]   Tue Nov 23, 2021   0010 Drug Screen Comment[de-identified] see below [JM]   0031 Discussed case with internal medicine who agreed to admit patient for observation.  [JM]      ED Course User Index  [BB] Femi Ibrahim DO  [JM] Marylin Srivastava MD       --------------------------------------------- PAST HISTORY ---------------------------------------------  Past Medical History:  has a past medical history of Acute congestive heart failure ([address] BC (acute kidney injury) ([address] Cardiac arrest ([address] Cephalgia, Chronic kidney disease, Depression, Diabetes mellitus ([address] Diabetic gastroparesis associated with type 1 diabetes mellitus ([address] Diabetic ketoacidosis ([address] Diabetic ketoacidosis with coma associated with type 1 diabetes mellitus ([address] Diabetic polyneuropathy associated with type 1 diabetes mellitus ([address] Drug use complicating pregnancy in third trimester, Endocarditis, ESRD (end stage renal disease) ([address] H/O cardiovascular stress test, Hemodialysis patient ([facility] History of blood transfusion, Hyperlipidemia, Hyperosmolar hyperglycemic state (HHS) ([facility] Hypothyroidism, Iron deficiency anemia, MDRO (multiple drug resistant organisms) resistance, MRSA (methicillin resistant Staphylococcus aureus), Non compliance w medication regimen, Other disorders of kidney and ureter, Pregnancy, Previous  delivery affecting pregnancy, antepartum, Previous stillbirth or demise, antepartum, Seizure ([address] Severe pre-eclampsia in third trimester, Shock liver, and Valvular endocarditis. Past Surgical History:  has a past surgical history that includes ECHO Compl W Dop Color Flow (2012); ECHO Compl W Dop Color Flow (6/10/2013);  section; Tunneled venous port placement (2018); pr esophagogastroduodenoscopy transoral diagnostic (N/A, 2018); back surgery; Colonoscopy (N/A, 2018); Colonoscopy (N/A, 2018); Upper gastrointestinal endoscopy (2018); Upper gastrointestinal endoscopy (N/A, 10/11/2019); Cholecystectomy, laparoscopic (N/A, 2019); LAPAROSCOPY INSERTION PERITONEAL CATHETER (N/A, 2020); transesophageal echocardiogram (N/A, 10/19/2020); embolectomy (N/A, 2020); Foot Debridement (Left, 2021); Foot Debridement (Left, 2021); Upper gastrointestinal endoscopy (N/A, 2021); and Catheter Removal (N/A, 10/1/2021). Social History:  reports that she quit smoking about 9 months ago. Her smoking use included cigarettes. She has a 3.00 pack-year smoking history. She has never used smokeless tobacco. She reports current drug use. Drug: Opiates . She reports that she does not drink alcohol. Family History: family history includes Asthma in her brother and mother; Diabetes in her mother; High Blood Pressure in her father and mother; Hypertension in her mother. The patients home medications have been reviewed.     Allergies: Cefepime and Toradol [ketorolac tromethamine]    -------------------------------------------------- RESULTS -------------------------------------------------    LABS:  Results for orders placed or performed during the hospital encounter of 21   COVID-19, Rapid    Specimen: Nasopharyngeal Swab   Result Value Ref Range    SARS-CoV-2, NAAT Not Detected Not Detected   CBC Auto Differential   Result Value Ref Range    WBC 9.7 4.5 - 11.5 E9/L    RBC 2.62 (L) 3.50 - 5.50 E12/L    Hemoglobin 7.6 (L) 11.5 - 15.5 g/dL    Hematocrit 23.5 (L) 34.0 - 48.0 %    MCV 89.7 80.0 - 99.9 fL    MCH 29.0 26.0 - 35.0 pg    MCHC 32.3 32.0 - 34.5 %    RDW 18.4 (H) 11.5 - 15.0 fL    Platelets 008 349 - 382 E9/L    MPV 10.4 7.0 - 12.0 fL    Neutrophils % 84.1 (H) 43.0 - 80.0 %    Immature Granulocytes % 0.7 0.0 - 5.0 %    Lymphocytes % 10.2 (L) 20.0 - 42.0 %    Monocytes % 3.7 2.0 - 12.0 %    Eosinophils % 0.7 0.0 - 6.0 %    Basophils % 0.6 0.0 - 2.0 %    Neutrophils Absolute 8.11 (H) 1.80 - 7.30 E9/L    Immature Granulocytes # 0.07 E9/L    Lymphocytes Absolute 0.98 (L) 1.50 - 4.00 E9/L    Monocytes Absolute 0.36 0.10 - 0.95 E9/L    Eosinophils Absolute 0.07 0.05 - 0.50 E9/L    Basophils Absolute 0.06 0.00 - 0.20 E9/L   Comprehensive Metabolic Panel w/ Reflex to MG   Result Value Ref Range    Sodium 135 132 - 146 mmol/L    Potassium reflex Magnesium 4.0 3.5 - 5.0 mmol/L    Chloride 93 (L) 98 - 107 mmol/L    CO2 29 22 - 29 mmol/L    Anion Gap 13 7 - 16 mmol/L    Glucose 384 (H) 74 - 99 mg/dL    BUN 19 6 - 20 mg/dL    CREATININE 4.4 (H) 0.5 - 1.0 mg/dL    GFR Non-African American 14 >=60 mL/min/1.73    GFR African American 14     Calcium 8.4 (L) 8.6 - 10.2 mg/dL    Total Protein 5.7 (L) 6.4 - 8.3 g/dL    Albumin 2.8 (L) 3.5 - 5.2 g/dL    Total Bilirubin <0.2 0.0 - 1.2 mg/dL    Alkaline Phosphatase 120 (H) 35 - 104 U/L    ALT 5 0 - 32 U/L    AST 6 0 - 31 U/L   Lipase   Result Value Ref Range    Lipase 7 (L) 13 - 60 U/L   PH, VENOUS   Result Value Ref Range    pH, Khurram 7.43 7.35 - 7.45   Urinalysis, reflex to microscopic   Result Value Ref Range    Color, UA Yellow Straw/Yellow    Clarity, UA Clear Clear    Glucose, Ur >=1000 (A) Negative mg/dL    Bilirubin Urine Negative Negative    Ketones, Urine TRACE (A) Negative mg/dL    Specific Gravity, UA 1.020 1.005 - 1.030    Blood, Urine TRACE (A) Negative    pH, UA 7.5 5.0 - 9.0    Protein, UA >=300 (A) Negative mg/dL    Urobilinogen, Urine 0.2 <2.0 E.U./dL    Nitrite, Urine Negative Negative    Leukocyte Esterase, Urine Negative Negative   Beta-Hydroxybutyrate   Result Value Ref Range    Beta-Hydroxybutyrate 0.53 (H) 0.02 - 0.27 mmol/L   Lactate, Sepsis   Result Value Ref Range    Lactic Acid, Sepsis 1.4 0.5 - 1.9 mmol/L   Lactate, Sepsis   Result Value Ref Range    Lactic Acid, Sepsis 0.8 0.5 - 1.9 mmol/L   CK   Result Value Ref Range    Total CK 22 20 - 180 U/L   Magnesium   Result Value Ref Range    Magnesium 2.0 1.6 - 2.6 mg/dL   Microscopic Urinalysis   Result Value Ref Range    WBC, UA 2-5 0 - 5 /HPF    RBC, UA 0-1 0 - 2 /HPF    Epithelial Cells, UA RARE /HPF    Bacteria, UA RARE (A) None Seen /HPF   Serum Drug Screen   Result Value Ref Range    Ethanol Lvl <10 mg/dL    Acetaminophen Level <5.0 (L) 10.0 - 16.1 mcg/mL    Salicylate, Serum <5.8 0.0 - 30.0 mg/dL   Urine Drug Screen   Result Value Ref Range    Amphetamine Screen, Urine NOT DETECTED Negative <1000 ng/mL    Barbiturate Screen, Ur NOT DETECTED Negative < 200 ng/mL    Benzodiazepine Screen, Urine NOT DETECTED Negative < 200 ng/mL    Cannabinoid Scrn, Ur NOT DETECTED Negative < 50ng/mL    Cocaine Metabolite Screen, Urine NOT DETECTED Negative < 300 ng/mL    Opiate Scrn, Ur NOT DETECTED Negative < 300ng/mL    PCP Screen, Urine NOT DETECTED Negative < 25 ng/mL    Methadone Screen, Urine NOT DETECTED Negative <300 ng/mL    Oxycodone Urine POSITIVE (A) Negative <100 ng/mL    FENTANYL SCREEN, URINE NOT DETECTED Negative <1 ng/mL    Drug Screen Comment: see below    POC Pregnancy Urine   Result Value Ref Range    HCG, Urine, POC Negative Negative    Lot Number [id number]     Positive QC Pass/Fail Pass     Negative QC Pass/Fail Pass    POCT Glucose   Result Value Ref Range    Glucose 395 mg/dL    QC OK?  yes    POCT Glucose   Result Value Ref Range    Meter Glucose 395 (H) 74 - 99 mg/dL       RADIOLOGY:  CT ABDOMEN PELVIS W IV CONTRAST Additional Contrast? None   Final Result   Large amount of ascites. Diffuse colonic fecal retention. Bilateral pleural effusions with adjacent atelectasis. Cholecystectomy. CT HEAD WO CONTRAST   Final Result   No acute intracranial abnormality.                 ------------------------- NURSING NOTES AND VITALS REVIEWED ---------------------------  Date / Time Roomed:  11/22/2021  6:56 PM  ED Bed Assignment:  02/02    The nursing notes within the ED encounter and vital signs as below have been reviewed. Patient Vitals for the past 24 hrs:   BP Temp Pulse Resp SpO2   11/23/21 0000 (!) 188/116  97 14 100 %   11/22/21 2345 (!) 156/110  97 12 100 %   11/22/21 2330   95 12 100 %   11/22/21 2315   96 9 100 %   11/22/21 2300   96 8 100 %   11/22/21 2245   95 8 100 %   11/22/21 2230   95 12 100 %   11/22/21 1936 125/88 97.8 °F (36.6 °C) 96 15 100 %       Oxygen Saturation Interpretation: Normal    ------------------------------------------ PROGRESS NOTES ------------------------------------------  See ED course for reevaluation    Counseling:  I have spoken with the patient and discussed todays results, in addition to providing specific details for the plan of care and counseling regarding the diagnosis and prognosis. Their questions are answered at this time and they are agreeable with the plan of admission.    --------------------------------- ADDITIONAL PROVIDER NOTES ---------------------------------  Consultations:   Spoke with Dr. Mattie Narvaez.  Discussed case. They will admit the patient.   This patient's ED course included: a personal history and physicial examination, re-evaluation prior to disposition, multiple bedside re-evaluations, IV medications, cardiac monitoring and continuous pulse oximetry    This patient has remained hemodynamically stable during their ED course. Diagnosis:  1. Altered mental status, unspecified altered mental status type    2. Generalized abdominal pain    3. Hyperglycemia    4.  Anemia, unspecified type        Disposition:  Patient's disposition: Admit to med/surg floor  Patient's condition is Stable         Maral Hyde MD  Resident  11/23/21 0040

## 2021-11-23 NOTE — ED NOTES
Dr Jose Francisco Pedroza notified of the D50 being given and that the pt wont eat because of pain and nausea unresolved with brian Khan RN  11/23/21 [id number]

## 2021-11-23 NOTE — ED NOTES
Radiology Procedure Waiver   Name: Jewels Kuo  : 1990  MRN: [id number]    Date:  21    Time: 10:07 PM EST    Benefits of immediately proceeding with Radiology exam(s) without pre-testing outweigh the risks or are not indicated as specified below and therefore the following is/are being waived:    [] Pregnancy test   [] Patients LMP on-time and regular.   [] Patient had Tubal Ligation or has other Contraception Device. [] Patient  is Menopausal or Premenarcheal.    [] Patient had Full or Partial Hysterectomy. [] Protocol for Iodine allergy    [] MRI Questionnaire     [x] BUN/Creatinine   [] Patient age w/no hx of renal dysfunction. [] Patient on Dialysis. [] Recent Normal Labs.   Electronically signed by Ngozi Jimenez MD on 21 at 10:07 PM EST               Ngozi Jimenez MD  Resident  21 [phone]

## 2021-11-23 NOTE — ED NOTES
hospitalist called re pt complaint of abdominal pain.  Told that pain meds would not be ordered at this time because of her chief complaint of unresponsive upon arrival.     Na Yanez RN  11/23/21 3582

## 2021-11-23 NOTE — CARE COORDINATION
SS Note: Covid test negative. Pt boarded in ED, presented for AMS/sleepiness. It was noted Confusion and changes in behavior most likely due to Psych vs Polysubstance abuse. Utox positive for Oipiates. Pt also has Anxiety with depression & Uncontrolled DM I & ESRD. SW met w/pt in ED 06 and spoke from door wearing mask/PPE and explained role. Pt is a readmission 11/11 - 11/15 Hyperosmolar hyperglycemic state. SW  completed the readmission review. Pt lives w/her mother who is RN & cares for her and pt's children (ages 3 and 11) in 2 story house,2 step entry with rail. She uses 1st level only. Pt has Home Hemodialysis that her mother performs 5 days a week,supplies are delivered by [facility] [phone]. Pt also has hx of [employer] [address] [facility] back in Feb of 2020. Pt reports typically she is fairly independent with ADLs and drives. She owns a wc,shower chair,BSC and glucometer/supplies. PCP is Dr Suri Espana and she uses [facility]. Pt has hx [facility] and [facility]. No hx of 02. Pt c/o nausea and pain in stomach. She reports she use to go to [facility] for counseling and will not go back there. However, she was amenable to trying another agency. [facility] listed Counseling agencies in [place]. SW asked pt about being + for opiates & pt reports this is related to her taking oxycodone which hospital prescribed. Pt denies other drug usage and identified no need to speak to PRS. MINISTER[facility] completed ACP w/pt. Pt plans on returning home.      Readmission Assessment  Number of Days since last admission?: 8-30 days  Previous Disposition: Home with Family  Who is being Interviewed: Patient  What was the patient's/caregiver's perception as to why they think they needed to return back to the hospital?: Other (Comment) (Nausea and pain in stomach- unalbe to tolerate)  Did you visit your Primary Care Physician after you left the hospital, before you returned this time?: No  Why weren't you able to visit your PCP?: Did not have an appointment  Did you see a specialist, such as Cardiac, Pulmonary, Orthopedic Physician, etc. after you left the hospital?: No  Who advised the patient to return to the hospital?: Self-referral  Does the patient report anything that got in the way of taking their medications?: No  In our efforts to provide the best possible care to you and others like you, can you think of anything that we could have done to help you after you left the hospital the first time, so that you might not have needed to return so soon?: Other (Comment) (Pt feels she was d/c'd too soon.)    Electronically signed by HANNAH Rosales on 11/23/2021 at 2:03 PM

## 2021-11-23 NOTE — CONSULTS
Nephrology Consult  [facility]  Deronda Schwab. Shaer. MD    CC:       HPI:       PMH:    Past Medical History:   Diagnosis Date    Acute congestive heart failure ([address]     BC (acute kidney injury) ([address] 10/01/2019    Cardiac arrest ([address] 02/15/2021    Cephalgia 10/09/2019    Chronic kidney disease     Depression     Diabetes mellitus ([address]     Diabetic gastroparesis associated with type 1 diabetes mellitus ([address] 2018    Diabetic ketoacidosis ([address] 2011    Diabetic ketoacidosis with coma associated with type 1 diabetes mellitus ([address] 2013    Diabetic polyneuropathy associated with type 1 diabetes mellitus ([address] 2020    Drug use complicating pregnancy in third trimester     Endocarditis 10/31/2020    ESRD (end stage renal disease) ([address] 2020    H/O cardiovascular stress test 2021    Lexiscan    Hemodialysis patient [facility])     History of blood transfusion 2019    Hyperlipidemia 10/08/2020    Hyperosmolar hyperglycemic state (HHS) ([address] 2020    Hypothyroidism 10/08/2020    Iron deficiency anemia 10/01/2019    MDRO (multiple drug resistant organisms) resistance     MRSA (methicillin resistant Staphylococcus aureus)     back wound abcess    Non compliance w medication regimen 2016    Other disorders of kidney and ureter     Pregnancy 2016    16 weeks    Previous  delivery affecting pregnancy, antepartum 2017    Previous stillbirth or demise, antepartum 2016    Seizure ([address] 2020    Severe pre-eclampsia in third trimester 2016    Shock liver 02/15/2021    Valvular endocarditis 11/10/2020    This Diagnosis was added to the Problem List based on transcribed orders from Dr. Ailin Schwarz          Patient Active Problem List   Diagnosis    Non compliance w medication regimen    Tobacco smoking complicating pregnancy    MTHFR mutation    Diabetic ketoacidosis without coma associated with type 1 diabetes mellitus ([facility]    Hypertension    Prolonged QT interval    Iron deficiency anemia    Sinus tachycardia    Bladder dysfunction    Hypokalemia    Severe protein-calorie malnutrition (HCC)    Uncontrolled type 1 diabetes mellitus with hyperglycemia (HCC)    End stage renal disease (HCC)    Hypothyroidism    Hyperlipidemia    Acute cystitis    Nondisplaced fracture of neck of fifth metacarpal bone, left hand, initial encounter for closed fracture    Chronic right-sided low back pain    Endocarditis    [facility]izure [facility]    Hyperkalemia    Hypomagnesemia    Hypocalcemia    Intractable nausea and vomiting    Wound of left leg, sequela    Syncope    Type 1 diabetes mellitus without complication ([facility])    Hyperosmolality    Major depressive disorder    Lactic acid acidosis    Early satiety    Acute on chronic systolic CHF (congestive heart failure) ([facility])    Atypical chest pain    Chronic renal failure syndrome    Sepsis (HCC)    ESRD (end stage renal disease) ([facility])    Hyperosmolar hyperglycemic state (HHS) ([facility]    Hypertensive urgency    Nausea    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) ([facility]    ESRD (end stage renal disease) on dialysis ([facility]    AMS (altered mental status)    Opioid overdose ([facility]       Meds:     insulin lispro  0-6 Units SubCUTAneous TID     insulin lispro  0-3 Units SubCUTAneous Nightly    sodium chloride flush  10 mL IntraVENous 2 times per day    rOPINIRole  0.25 mg Oral Nightly    mirtazapine  15 mg Oral Nightly    ARIPiprazole  5 mg Oral Nightly    sevelamer  800 mg Oral TID     [START ON 11/30/2021] vitamin D  50,000 Units Oral Weekly    bumetanide  2 mg Oral BID    metoprolol tartrate  50 mg Oral BID    amLODIPine  5 mg Oral Daily    levothyroxine  75 mcg Oral Daily    pantoprazole  40 mg Oral QAM AC        dextrose      sodium chloride         Meds prn:     glucose, dextrose, glucagon (rDNA), dextrose, sodium chloride flush, sodium chloride, promethazine **OR** ondansetron, acetaminophen **OR** acetaminophen, polyethylene glycol, famotidine    Meds prior to admission:     No current facility-administered medications on file prior to encounter. Current Outpatient Medications on File Prior to Encounter   Medication Sig Dispense Refill    insulin glargine (LANTUS) 100 UNIT/ML injection vial Inject 6 Units into the skin 2 times daily 10 mL 3    metoprolol tartrate (LOPRESSOR) 50 MG tablet Take 1 tablet by mouth 2 times daily 60 tablet 0    amLODIPine (NORVASC) 5 MG tablet Take 1 tablet by mouth daily 30 tablet 0    bumetanide (BUMEX) 2 MG tablet Take 2 mg by mouth 2 times daily      pantoprazole (PROTONIX) 40 MG tablet Take 40 mg by mouth daily as needed (Reflux)      vitamin D (ERGOCALCIFEROL) 1.25 MG (56658 UT) CAPS capsule Take 1 capsule by mouth once a week 5 capsule 0    sevelamer (RENVELA) 800 MG tablet Take 1 tablet by mouth 3 times daily (with meals) New lower dose 90 tablet 3    mirtazapine (REMERON) 15 MG tablet Take 15 mg by mouth nightly      polyethylene glycol (GLYCOLAX) 17 g packet Take 17 g by mouth daily as needed for Constipation      ARIPiprazole (ABILIFY) 5 MG tablet Take 5 mg by mouth nightly      levothyroxine (SYNTHROID) 75 MCG tablet TAKE 1 TABLET BY MOUTH IN THE MORNING BEFORE BREAKFAST 60 tablet 0    rOPINIRole (REQUIP) 0.25 MG tablet Take 0.25 mg by mouth nightly         Allergies:    Cefepime and Toradol [ketorolac tromethamine]    Social History:     reports that she quit smoking about 9 months ago. Her smoking use included cigarettes. She has a 3.00 pack-year smoking history. She has never used smokeless tobacco. She reports current drug use. Drug: Opiates . She reports that she does not drink alcohol.     Family History:         Problem Relation Age of Onset   Salma Ashleigh Asthma Mother     Hypertension Mother     High Blood Pressure Mother     Diabetes Mother     Asthma Brother     High Blood Pressure Father ROS:     General: no fever, chills   Heent: no nasal congestion, sore throat   Resp: no cough, sob , hemoptysis  Cardiac: no cp , le edema, palpitations  Gi: no nausea, vomiting, melena, abd pain, hematemesis  Gu: no hematuria, dysuria   Neruo: no numbness, weakness, headache, blurry vision   Endocrine:  no h/o dm  Derm: no rash , petechia  Heme: no epistaxis, bruising  All other sx negative     Physical Exam:      Patient Vitals for the past 24 hrs:   BP Temp Temp src Pulse Resp SpO2   11/23/21 0948 118/78   101     11/23/21 0945 118/78 98.2 °F (36.8 °C) Oral 97 18 96 %   11/23/21 0700 (!) 130/96   98 14 98 %   11/23/21 0215    98 17 100 %   11/23/21 0200 (!) 159/109   98 14 100 %   11/23/21 0145    98 18 100 %   11/23/21 0130 (!) 174/108   98 10 100 %   11/23/21 0115 (!) 187/110   102 21 100 %   11/23/21 0100 (!) 185/124   99 12 100 %   11/23/21 0045    98 13 100 %   11/23/21 0030 (!) 174/117   97 15 100 %   11/23/21 0015    98 17 100 %   11/23/21 0000 (!) 188/116   97 14 100 %   11/22/21 2345 (!) 156/110   97 12 100 %   11/22/21 2330    95 12 100 %   11/22/21 2315    96 9 100 %   11/22/21 2300    96 8 100 %   11/22/21 2245    95 8 100 %   11/22/21 2230    95 12 100 %   11/22/21 1936 125/88 97.8 °F (36.6 °C)  96 15 100 %         Intake/Output Summary (Last 24 hours) at 11/23/2021 1207  Last data filed at 11/23/2021 6615  Gross per 24 hour   Intake 20 ml   Output    Net 20 ml       Constitutional: Patient in no acute distress   Head: normocephalic, atraumatic   Neck: supple, no jvd  Cardiovascular: regular rate and rhythm, no murmurs, gallops, or rubs   Respiratory: Clear, no rales, rhochi, or wheezes,   Gastrointestinal: soft, nontender, nondistended, no hepatosplenomegaly  Ext:   Neuro:  Skin: dry, no rash   Back: nontender    Data:    Recent Labs     11/22/21 1929   WBC 9.7   HGB 7.6*   HCT 23.5*   MCV 89.7          Recent Labs     11/22/21 1929 11/22/21 1929 11/23/21  0202 11/23/21  0723 11/23/21  1200     --   --   --   --    K 4.0  --   --   --   --    CL 93*  --   --   --   --    CO2 29  --   --   --   --    CREATININE 4.4*  --   --   --   --    BUN 19  --   --   --   --    LABGLOM 14  --   --   --   --    GLUCOSE 384*   < > 321 145 74   CALCIUM 8.4*  --   --   --   --    MG 2.0  --   --   --   --     < > = values in this interval not displayed. Vit D, 25-Hydroxy   Date Value Ref Range Status   07/12/2021 <5 (L) 30 - 100 ng/mL Final     Comment:     <20 ng/mL. ........... [place] Deficient  20-30 ng/mL. ......... [place] Insufficient   ng/mL. ........ [place] Sufficient  >100 ng/mL. .......... [place] Toxic         PTH   Date Value Ref Range Status   01/15/2020 71 (H) 15 - 65 pg/mL Final       Recent Labs     11/22/21 1929   ALT 5   AST 6   ALKPHOS 120*   BILITOT <0.2       Recent Labs     11/22/21 1929   LABALBU 2.8*       Ferritin   Date Value Ref Range Status   08/08/2021 182 ng/mL Final     Comment:     FERRITIN Reference Ranges:  Adult Males   20 - 60 years:    30 - 400 ng/mL  Adult females 16 - 61 years:    15 - 150 ng/mL  Adults greater than 60 years:   no established reference range  Pediatrics:                     no established reference range       Iron   Date Value Ref Range Status   11/11/2021 48 37 - 145 mcg/dL Final     TIBC   Date Value Ref Range Status   11/11/2021 138 (L) 250 - 450 mcg/dL Final       Vitamin B-12   Date Value Ref Range Status   08/08/2021 1315 (H) 211 - 946 pg/mL Final       Folate   Date Value Ref Range Status   08/08/2021 9.3 4.8 - 24.2 ng/mL Final         Lab Results   Component Value Date    COLORU Yellow 11/22/2021    NITRU Negative 11/22/2021    GLUCOSEU >=1000 11/22/2021    GLUCOSEU >=1000 05/18/2012    KETUA TRACE 11/22/2021    UROBILINOGEN 0.2 11/22/2021    BILIRUBINUR Negative 11/22/2021    BILIRUBINUR SMALL 05/18/2012       Lab Results   Component Value Date/Time    RUTH 426 01/15/2020 12:15 AM       No components found for: URIC    No results found for: LIPIDPAN      Assessment and Plan:    1. esrd    2. htn  Metoprolol norvasc    3. Anemia  Continue arturo    4. opiod overdose    5. Sec hyperparathyroidism      Velrafael May.  Keyshawn Juarez MD

## 2021-11-23 NOTE — PLAN OF CARE
623/21 72-year-old female PMH ESRD, depression and DM, presented to [facility] with complaints of AMS/sleepiness over the past couple of days prior to presentation. Patient noted with confusion in ED course. Patient seen in ED. States she is having some abdominal discomfort and burning in her upper abdomen. Does continue to have drowsiness. Requesting pain medication. 1.  AMS2/2 psych/polysubstance abuse? Hold off on opioids at this time. CT brain unremarkable. Urine toxicity positive opiates. Continue falls/aspiration/seizure precautions. TSH/B12/folate follow  2. ESRD requiring HDavoid nephrotoxic agents. HD. Continue fluid restriction 1L daily. Nephrology input appreciated. 3.  HTNcontinue amlodipine/Bumex/metoprolol tartrate  4. Hypothyroidcontinue levothyroxine  5. Anxiety/depressioncontinue home regimen.

## 2021-11-23 NOTE — ED NOTES
Bed: 06  Expected date:   Expected time:   Means of arrival:   Comments:  Sharee in [facility] June, [address] [facility]  11/23/21 0802

## 2021-11-24 ENCOUNTER — APPOINTMENT (OUTPATIENT)
Dept: GENERAL RADIOLOGY | Age: 31
End: 2021-11-24
Payer: COMMERCIAL

## 2021-11-24 LAB
ALBUMIN SERPL-MCNC: 2.5 G/DL (ref 3.5–5.2)
ALP BLD-CCNC: 109 U/L (ref 35–104)
ALT SERPL-CCNC: <5 U/L (ref 0–32)
ANION GAP SERPL CALCULATED.3IONS-SCNC: 14 MMOL/L (ref 7–16)
AST SERPL-CCNC: 6 U/L (ref 0–31)
BASOPHILS ABSOLUTE: 0.05 E9/L (ref 0–0.2)
BASOPHILS RELATIVE PERCENT: 0.5 % (ref 0–2)
BILIRUB SERPL-MCNC: <0.2 MG/DL (ref 0–1.2)
BUN BLDV-MCNC: 21 MG/DL (ref 6–20)
CALCIUM SERPL-MCNC: 8.2 MG/DL (ref 8.6–10.2)
CHLORIDE BLD-SCNC: 98 MMOL/L (ref 98–107)
CO2: 22 MMOL/L (ref 22–29)
CREAT SERPL-MCNC: 5.8 MG/DL (ref 0.5–1)
EOSINOPHILS ABSOLUTE: 0.48 E9/L (ref 0.05–0.5)
EOSINOPHILS RELATIVE PERCENT: 4.5 % (ref 0–6)
FOLATE: 9.2 NG/ML (ref 4.8–24.2)
GFR AFRICAN AMERICAN: 10
GFR NON-AFRICAN AMERICAN: 10 ML/MIN/1.73
GLUCOSE BLD-MCNC: 221 MG/DL (ref 74–99)
HAV IGM SER IA-ACNC: NORMAL
HCT VFR BLD CALC: 23 % (ref 34–48)
HEMOGLOBIN: 7.1 G/DL (ref 11.5–15.5)
HEPATITIS B CORE IGM ANTIBODY: NORMAL
HEPATITIS B SURFACE ANTIGEN INTERPRETATION: NORMAL
HEPATITIS C ANTIBODY INTERPRETATION: NORMAL
IMMATURE GRANULOCYTES #: 0.05 E9/L
IMMATURE GRANULOCYTES %: 0.5 % (ref 0–5)
LYMPHOCYTES ABSOLUTE: 1.15 E9/L (ref 1.5–4)
LYMPHOCYTES RELATIVE PERCENT: 10.8 % (ref 20–42)
MCH RBC QN AUTO: 29.2 PG (ref 26–35)
MCHC RBC AUTO-ENTMCNC: 30.9 % (ref 32–34.5)
MCV RBC AUTO: 94.7 FL (ref 80–99.9)
METER GLUCOSE: 149 MG/DL (ref 74–99)
METER GLUCOSE: 176 MG/DL (ref 74–99)
METER GLUCOSE: 238 MG/DL (ref 74–99)
METER GLUCOSE: 263 MG/DL (ref 74–99)
METER GLUCOSE: 267 MG/DL (ref 74–99)
MONOCYTES ABSOLUTE: 0.23 E9/L (ref 0.1–0.95)
MONOCYTES RELATIVE PERCENT: 2.2 % (ref 2–12)
NEUTROPHILS ABSOLUTE: 8.7 E9/L (ref 1.8–7.3)
NEUTROPHILS RELATIVE PERCENT: 81.5 % (ref 43–80)
PDW BLD-RTO: 19.9 FL (ref 11.5–15)
PLATELET # BLD: 293 E9/L (ref 130–450)
PMV BLD AUTO: 10.7 FL (ref 7–12)
POTASSIUM REFLEX MAGNESIUM: 4.2 MMOL/L (ref 3.5–5)
RBC # BLD: 2.43 E12/L (ref 3.5–5.5)
SODIUM BLD-SCNC: 134 MMOL/L (ref 132–146)
T4 FREE: 1.16 NG/DL (ref 0.93–1.7)
TOTAL PROTEIN: 5 G/DL (ref 6.4–8.3)
TSH SERPL DL<=0.05 MIU/L-ACNC: 5 UIU/ML (ref 0.27–4.2)
VITAMIN B-12: 1427 PG/ML (ref 211–946)
WBC # BLD: 10.7 E9/L (ref 4.5–11.5)

## 2021-11-24 PROCEDURE — 82962 GLUCOSE BLOOD TEST: CPT

## 2021-11-24 PROCEDURE — 71045 X-RAY EXAM CHEST 1 VIEW: CPT

## 2021-11-24 PROCEDURE — 80053 COMPREHEN METABOLIC PANEL: CPT

## 2021-11-24 PROCEDURE — 90935 HEMODIALYSIS ONE EVALUATION: CPT

## 2021-11-24 PROCEDURE — 84439 ASSAY OF FREE THYROXINE: CPT

## 2021-11-24 PROCEDURE — APPSS30 APP SPLIT SHARED TIME 16-30 MINUTES: Performed by: NURSE PRACTITIONER

## 2021-11-24 PROCEDURE — 2580000003 HC RX 258: Performed by: INTERNAL MEDICINE

## 2021-11-24 PROCEDURE — 84443 ASSAY THYROID STIM HORMONE: CPT

## 2021-11-24 PROCEDURE — 6370000000 HC RX 637 (ALT 250 FOR IP): Performed by: INTERNAL MEDICINE

## 2021-11-24 PROCEDURE — 85025 COMPLETE CBC W/AUTO DIFF WBC: CPT

## 2021-11-24 PROCEDURE — 82607 VITAMIN B-12: CPT

## 2021-11-24 PROCEDURE — 36415 COLL VENOUS BLD VENIPUNCTURE: CPT

## 2021-11-24 PROCEDURE — 6370000000 HC RX 637 (ALT 250 FOR IP): Performed by: NURSE PRACTITIONER

## 2021-11-24 PROCEDURE — 6360000002 HC RX W HCPCS: Performed by: NURSE PRACTITIONER

## 2021-11-24 PROCEDURE — 82746 ASSAY OF FOLIC ACID SERUM: CPT

## 2021-11-24 PROCEDURE — 96372 THER/PROPH/DIAG INJ SC/IM: CPT

## 2021-11-24 PROCEDURE — 99232 SBSQ HOSP IP/OBS MODERATE 35: CPT | Performed by: STUDENT IN AN ORGANIZED HEALTH CARE EDUCATION/TRAINING PROGRAM

## 2021-11-24 RX ORDER — SENNA AND DOCUSATE SODIUM 50; 8.6 MG/1; MG/1
2 TABLET, FILM COATED ORAL NIGHTLY
Status: DISCONTINUED | OUTPATIENT
Start: 2021-11-24 | End: 2021-11-25 | Stop reason: HOSPADM

## 2021-11-24 RX ORDER — OXYCODONE HYDROCHLORIDE AND ACETAMINOPHEN 5; 325 MG/1; MG/1
1 TABLET ORAL EVERY 6 HOURS PRN
Status: DISCONTINUED | OUTPATIENT
Start: 2021-11-24 | End: 2021-11-25 | Stop reason: HOSPADM

## 2021-11-24 RX ORDER — POLYETHYLENE GLYCOL 3350 17 G/17G
17 POWDER, FOR SOLUTION ORAL DAILY
Status: DISCONTINUED | OUTPATIENT
Start: 2021-11-24 | End: 2021-11-25 | Stop reason: HOSPADM

## 2021-11-24 RX ORDER — OXYCODONE HYDROCHLORIDE AND ACETAMINOPHEN 5; 325 MG/1; MG/1
1 TABLET ORAL EVERY 4 HOURS PRN
Status: DISCONTINUED | OUTPATIENT
Start: 2021-11-24 | End: 2021-11-24

## 2021-11-24 RX ADMIN — BUMETANIDE 2 MG: 1 TABLET ORAL at 09:45

## 2021-11-24 RX ADMIN — PANTOPRAZOLE SODIUM 40 MG: 40 TABLET, DELAYED RELEASE ORAL at 05:42

## 2021-11-24 RX ADMIN — INSULIN LISPRO 1 UNITS: 100 INJECTION, SOLUTION INTRAVENOUS; SUBCUTANEOUS at 18:41

## 2021-11-24 RX ADMIN — Medication 10 ML: at 09:27

## 2021-11-24 RX ADMIN — MIRTAZAPINE 15 MG: 15 TABLET, FILM COATED ORAL at 20:45

## 2021-11-24 RX ADMIN — METOPROLOL TARTRATE 50 MG: 25 TABLET, FILM COATED ORAL at 20:45

## 2021-11-24 RX ADMIN — SEVELAMER CARBONATE 800 MG: 800 TABLET, FILM COATED ORAL at 11:47

## 2021-11-24 RX ADMIN — AMLODIPINE BESYLATE 5 MG: 5 TABLET ORAL at 09:28

## 2021-11-24 RX ADMIN — ARIPIPRAZOLE 5 MG: 5 TABLET ORAL at 20:45

## 2021-11-24 RX ADMIN — ROPINIROLE HYDROCHLORIDE 0.25 MG: 0.25 TABLET, FILM COATED ORAL at 20:45

## 2021-11-24 RX ADMIN — Medication 10 ML: at 20:57

## 2021-11-24 RX ADMIN — METOPROLOL TARTRATE 50 MG: 25 TABLET, FILM COATED ORAL at 09:28

## 2021-11-24 RX ADMIN — EPOETIN ALFA-EPBX 10000 UNITS: 10000 INJECTION, SOLUTION INTRAVENOUS; SUBCUTANEOUS at 17:59

## 2021-11-24 RX ADMIN — INSULIN LISPRO 2 UNITS: 100 INJECTION, SOLUTION INTRAVENOUS; SUBCUTANEOUS at 09:47

## 2021-11-24 RX ADMIN — SEVELAMER CARBONATE 800 MG: 800 TABLET, FILM COATED ORAL at 09:29

## 2021-11-24 RX ADMIN — INSULIN LISPRO 2 UNITS: 100 INJECTION, SOLUTION INTRAVENOUS; SUBCUTANEOUS at 20:51

## 2021-11-24 RX ADMIN — POLYETHYLENE GLYCOL 3350 17 G: 17 POWDER, FOR SOLUTION ORAL at 09:59

## 2021-11-24 RX ADMIN — LEVOTHYROXINE SODIUM 75 MCG: 0.07 TABLET ORAL at 05:42

## 2021-11-24 RX ADMIN — SEVELAMER CARBONATE 800 MG: 800 TABLET, FILM COATED ORAL at 17:59

## 2021-11-24 RX ADMIN — OXYCODONE AND ACETAMINOPHEN 1 TABLET: 5; 325 TABLET ORAL at 09:45

## 2021-11-24 RX ADMIN — BUMETANIDE 2 MG: 1 TABLET ORAL at 20:45

## 2021-11-24 RX ADMIN — OXYCODONE AND ACETAMINOPHEN 1 TABLET: 5; 325 TABLET ORAL at 18:42

## 2021-11-24 ASSESSMENT — PAIN DESCRIPTION - PAIN TYPE
TYPE: ACUTE PAIN
TYPE: ACUTE PAIN

## 2021-11-24 ASSESSMENT — PAIN SCALES - GENERAL
PAINLEVEL_OUTOF10: 0
PAINLEVEL_OUTOF10: 8
PAINLEVEL_OUTOF10: 6
PAINLEVEL_OUTOF10: 8
PAINLEVEL_OUTOF10: 0

## 2021-11-24 ASSESSMENT — PAIN DESCRIPTION - LOCATION: LOCATION: ABDOMEN

## 2021-11-24 ASSESSMENT — PAIN DESCRIPTION - ORIENTATION: ORIENTATION: UPPER;RIGHT;LEFT;MID

## 2021-11-24 ASSESSMENT — PAIN DESCRIPTION - DESCRIPTORS: DESCRIPTORS: BURNING;CRAMPING

## 2021-11-24 NOTE — CARE COORDINATION
11/24/21 1611 CM note: COVID (-) 11/22/21. Discharge plan is home and patient declines [address]. Pt lives with her mother who cares for her and pt's children, pt has Home Hemodialysis that her mother performs 5 days a week, supplies are delivered by [facility] [phone]. Pts mom will provide transportation home.  Electronically signed by Angel Mueller RN on 11/24/2021 at 4:13 PM

## 2021-11-24 NOTE — FLOWSHEET NOTE
1800 off tolerated well   11/24/21 1700   Vital Signs   /64   Temp 98.1 °F (36.7 °C)   Pulse 92   Resp 20   Weight 149 lb 14.6 oz (68 kg)   Weight Method Bed scale   Percent Weight Change -2.02   Post-Hemodialysis Assessment   Post-Treatment Procedures Blood returned; Catheter capped, clamped and heparinized x 2 ports   Machine Disinfection Process Acid/Vinegar Clean; Heat Disinfect   Rinseback Volume (ml) 300 ml   Total Liters Processed (l/min) 87.8 l/min   Duration of Treatment (minutes) 240 minutes   Hemodialysis Intake (ml) 300 ml   Hemodialysis Output (ml) 2100 ml   NET Removed (ml) 1800 ml   Patient Response to Treatment tolerated well   Edema Generalized

## 2021-11-24 NOTE — PROGRESS NOTES
[address] [place], [place] [address]ist   Progress Note    Admitting Date and Time: 11/22/2021  6:56 PM  Admit Dx: Generalized abdominal pain [R10.84]  Hyperglycemia [R73.9]  Opioid overdose, accidental or unintentional, initial encounter ([facility] [T40.2X1A]  Altered mental status, unspecified altered mental status type [R41.82]  Anemia, unspecified type [D64.9]  AMS (altered mental status) [R41.82]    Subjective:    Patient complains of abdominal pain. She appears to be back to her baseline mental status. ROS: denies fever, chills, cp, sob, n/v, HA unless stated above.      polyethylene glycol  17 g Oral Daily    sennosides-docusate sodium  2 tablet Oral Nightly    insulin lispro  0-6 Units SubCUTAneous TID WC    insulin lispro  0-3 Units SubCUTAneous Nightly    sodium chloride flush  10 mL IntraVENous 2 times per day    rOPINIRole  0.25 mg Oral Nightly    mirtazapine  15 mg Oral Nightly    ARIPiprazole  5 mg Oral Nightly    sevelamer  800 mg Oral TID WC    [START ON 11/30/2021] vitamin D  50,000 Units Oral Weekly    bumetanide  2 mg Oral BID    metoprolol tartrate  50 mg Oral BID    amLODIPine  5 mg Oral Daily    levothyroxine  75 mcg Oral Daily    pantoprazole  40 mg Oral QAM AC     oxyCODONE-acetaminophen, 1 tablet, Q6H PRN  glucose, 15 g, PRN  dextrose, 12.5 g, PRN  glucagon (rDNA), 1 mg, PRN  dextrose, 100 mL/hr, PRN  sodium chloride flush, 10 mL, PRN  sodium chloride, 25 mL, PRN  promethazine, 12.5 mg, Q6H PRN   Or  ondansetron, 4 mg, Q6H PRN  acetaminophen, 650 mg, Q6H PRN   Or  acetaminophen, 650 mg, Q6H PRN  famotidine, 20 mg, Daily PRN         Objective:    /83   Pulse 102   Temp 98.4 °F (36.9 °C) (Oral)   Resp 16   Ht 5' 4\" (1.626 m)   Wt 153 lb (69.4 kg)   SpO2 100%   BMI 26.26 kg/m²   General Appearance: alert and oriented to person, place and time and in no acute distress  Skin: warm and dry  Head: normocephalic and atraumatic  Eyes: pupils equal, round, and reactive to light, extraocular eye movements intact, conjunctivae normal  Neck: neck supple and non tender without mass   Pulmonary/Chest: diminished bilaterally, no respiratory distress, tunneled catheter with sutures in place at the insertion site  Cardiovascular: normal rate, normal S1 and S2   Abdomen: soft, tender, distended, normal bowel sounds, old abdominal incision from peritoneal dialysis catheter site. Extremities: no cyanosis, no clubbing and no edema  Neurologic: no cranial nerve deficit and speech normal      Recent Labs     11/22/21 1929 11/22/21 1929 11/23/21  0202 11/23/21  0723 11/23/21  1200     --   --   --   --    K 4.0  --   --   --   --    CL 93*  --   --   --   --    CO2 29  --   --   --   --    BUN 19  --   --   --   --    CREATININE 4.4*  --   --   --   --    GLUCOSE 384*   < > 321 145 74   CALCIUM 8.4*  --   --   --   --     < > = values in this interval not displayed. Recent Labs     11/22/21 1929   ALKPHOS 120*   PROT 5.7*   LABALBU 2.8*   BILITOT <0.2   AST 6   ALT 5       Recent Labs     11/22/21 1929 11/24/21  0852   WBC 9.7 10.7   RBC 2.62* 2.43*   HGB 7.6* 7.1*   HCT 23.5* 23.0*   MCV 89.7 94.7   MCH 29.0 29.2   MCHC 32.3 30.9*   RDW 18.4* 19.9*    293   MPV 10.4 10.7           Radiology:   CT ABDOMEN PELVIS W IV CONTRAST Additional Contrast? None   Final Result   Large amount of ascites. Diffuse colonic fecal retention. Bilateral pleural effusions with adjacent atelectasis. Cholecystectomy. CT HEAD WO CONTRAST   Final Result   No acute intracranial abnormality. Assessment:  Active Problems:    AMS (altered mental status)    Opioid overdose ([address]  Resolved Problems:    * No resolved hospital problems. *      Plan:  1. AMS: Blood glucose was elevated (395) but she was not in DKA (Beta-hydroxybutyrate was 0.53 and anion gap was 13). CT of the head with no acute findings.   Urine drug screen was positive for opiates, of note her OARRS

## 2021-11-25 VITALS
RESPIRATION RATE: 16 BRPM | TEMPERATURE: 98.3 F | DIASTOLIC BLOOD PRESSURE: 75 MMHG | HEART RATE: 106 BPM | BODY MASS INDEX: 25.59 KG/M2 | HEIGHT: 64 IN | OXYGEN SATURATION: 97 % | SYSTOLIC BLOOD PRESSURE: 115 MMHG | WEIGHT: 149.91 LBS

## 2021-11-25 PROBLEM — R79.89 ELEVATED TSH: Status: ACTIVE | Noted: 2021-11-25

## 2021-11-25 LAB
ABO/RH: NORMAL
ALBUMIN SERPL-MCNC: 2.5 G/DL (ref 3.5–5.2)
ALP BLD-CCNC: 108 U/L (ref 35–104)
ALT SERPL-CCNC: <5 U/L (ref 0–32)
ANION GAP SERPL CALCULATED.3IONS-SCNC: 9 MMOL/L (ref 7–16)
ANTIBODY SCREEN: NORMAL
AST SERPL-CCNC: 8 U/L (ref 0–31)
BASOPHILS ABSOLUTE: 0.05 E9/L (ref 0–0.2)
BASOPHILS RELATIVE PERCENT: 0.6 % (ref 0–2)
BILIRUB SERPL-MCNC: <0.2 MG/DL (ref 0–1.2)
BUN BLDV-MCNC: 12 MG/DL (ref 6–20)
CALCIUM SERPL-MCNC: 7.8 MG/DL (ref 8.6–10.2)
CHLORIDE BLD-SCNC: 98 MMOL/L (ref 98–107)
CO2: 27 MMOL/L (ref 22–29)
CREAT SERPL-MCNC: 3.6 MG/DL (ref 0.5–1)
EOSINOPHILS ABSOLUTE: 0.49 E9/L (ref 0.05–0.5)
EOSINOPHILS RELATIVE PERCENT: 6.1 % (ref 0–6)
GFR AFRICAN AMERICAN: 18
GFR NON-AFRICAN AMERICAN: 18 ML/MIN/1.73
GLUCOSE BLD-MCNC: 120 MG/DL (ref 74–99)
HCT VFR BLD CALC: 22 % (ref 34–48)
HCT VFR BLD CALC: 24.5 % (ref 34–48)
HEMOGLOBIN: 6.9 G/DL (ref 11.5–15.5)
HEMOGLOBIN: 7.9 G/DL (ref 11.5–15.5)
IMMATURE GRANULOCYTES #: 0.04 E9/L
IMMATURE GRANULOCYTES %: 0.5 % (ref 0–5)
LIPASE: 9 U/L (ref 13–60)
LYMPHOCYTES ABSOLUTE: 1.53 E9/L (ref 1.5–4)
LYMPHOCYTES RELATIVE PERCENT: 19.1 % (ref 20–42)
MAGNESIUM: 1.9 MG/DL (ref 1.6–2.6)
MCH RBC QN AUTO: 29 PG (ref 26–35)
MCHC RBC AUTO-ENTMCNC: 31.4 % (ref 32–34.5)
MCV RBC AUTO: 92.4 FL (ref 80–99.9)
METER GLUCOSE: 102 MG/DL (ref 74–99)
METER GLUCOSE: 216 MG/DL (ref 74–99)
METER GLUCOSE: 271 MG/DL (ref 74–99)
METER GLUCOSE: 357 MG/DL (ref 74–99)
METER GLUCOSE: 484 MG/DL (ref 74–99)
MONOCYTES ABSOLUTE: 0.25 E9/L (ref 0.1–0.95)
MONOCYTES RELATIVE PERCENT: 3.1 % (ref 2–12)
NEUTROPHILS ABSOLUTE: 5.66 E9/L (ref 1.8–7.3)
NEUTROPHILS RELATIVE PERCENT: 70.6 % (ref 43–80)
PDW BLD-RTO: 19.4 FL (ref 11.5–15)
PHOSPHORUS: 2.5 MG/DL (ref 2.5–4.5)
PLATELET # BLD: 250 E9/L (ref 130–450)
PMV BLD AUTO: 10.2 FL (ref 7–12)
POTASSIUM SERPL-SCNC: 3.6 MMOL/L (ref 3.5–5)
RBC # BLD: 2.38 E12/L (ref 3.5–5.5)
SODIUM BLD-SCNC: 134 MMOL/L (ref 132–146)
TOTAL PROTEIN: 5.2 G/DL (ref 6.4–8.3)
WBC # BLD: 8 E9/L (ref 4.5–11.5)

## 2021-11-25 PROCEDURE — 86850 RBC ANTIBODY SCREEN: CPT

## 2021-11-25 PROCEDURE — 36430 TRANSFUSION BLD/BLD COMPNT: CPT

## 2021-11-25 PROCEDURE — P9016 RBC LEUKOCYTES REDUCED: HCPCS

## 2021-11-25 PROCEDURE — 85018 HEMOGLOBIN: CPT

## 2021-11-25 PROCEDURE — 84100 ASSAY OF PHOSPHORUS: CPT

## 2021-11-25 PROCEDURE — P9047 ALBUMIN (HUMAN), 25%, 50ML: HCPCS | Performed by: NURSE PRACTITIONER

## 2021-11-25 PROCEDURE — 99217 PR OBSERVATION CARE DISCHARGE MANAGEMENT: CPT | Performed by: FAMILY MEDICINE

## 2021-11-25 PROCEDURE — APPSS30 APP SPLIT SHARED TIME 16-30 MINUTES: Performed by: PHYSICIAN ASSISTANT

## 2021-11-25 PROCEDURE — 85025 COMPLETE CBC W/AUTO DIFF WBC: CPT

## 2021-11-25 PROCEDURE — 86901 BLOOD TYPING SEROLOGIC RH(D): CPT

## 2021-11-25 PROCEDURE — 96366 THER/PROPH/DIAG IV INF ADDON: CPT

## 2021-11-25 PROCEDURE — 82962 GLUCOSE BLOOD TEST: CPT

## 2021-11-25 PROCEDURE — 2580000003 HC RX 258: Performed by: INTERNAL MEDICINE

## 2021-11-25 PROCEDURE — 6360000002 HC RX W HCPCS: Performed by: NURSE PRACTITIONER

## 2021-11-25 PROCEDURE — 6370000000 HC RX 637 (ALT 250 FOR IP): Performed by: NURSE PRACTITIONER

## 2021-11-25 PROCEDURE — 83690 ASSAY OF LIPASE: CPT

## 2021-11-25 PROCEDURE — 6370000000 HC RX 637 (ALT 250 FOR IP): Performed by: INTERNAL MEDICINE

## 2021-11-25 PROCEDURE — 96365 THER/PROPH/DIAG IV INF INIT: CPT

## 2021-11-25 PROCEDURE — 83735 ASSAY OF MAGNESIUM: CPT

## 2021-11-25 PROCEDURE — 80053 COMPREHEN METABOLIC PANEL: CPT

## 2021-11-25 PROCEDURE — 85014 HEMATOCRIT: CPT

## 2021-11-25 PROCEDURE — 86900 BLOOD TYPING SEROLOGIC ABO: CPT

## 2021-11-25 PROCEDURE — 6370000000 HC RX 637 (ALT 250 FOR IP): Performed by: FAMILY MEDICINE

## 2021-11-25 PROCEDURE — 36415 COLL VENOUS BLD VENIPUNCTURE: CPT

## 2021-11-25 RX ORDER — SODIUM CHLORIDE 9 MG/ML
INJECTION, SOLUTION INTRAVENOUS PRN
Status: DISCONTINUED | OUTPATIENT
Start: 2021-11-25 | End: 2021-11-25 | Stop reason: HOSPADM

## 2021-11-25 RX ORDER — ALBUMIN (HUMAN) 12.5 G/50ML
25 SOLUTION INTRAVENOUS EVERY 8 HOURS
Status: DISCONTINUED | OUTPATIENT
Start: 2021-11-25 | End: 2021-11-25 | Stop reason: HOSPADM

## 2021-11-25 RX ORDER — SENNA AND DOCUSATE SODIUM 50; 8.6 MG/1; MG/1
2 TABLET, FILM COATED ORAL NIGHTLY PRN
Status: ON HOLD | COMMUNITY
Start: 2021-11-25 | End: 2022-03-03 | Stop reason: HOSPADM

## 2021-11-25 RX ORDER — OXYCODONE HYDROCHLORIDE AND ACETAMINOPHEN 5; 325 MG/1; MG/1
2 TABLET ORAL ONCE
Status: COMPLETED | OUTPATIENT
Start: 2021-11-25 | End: 2021-11-25

## 2021-11-25 RX ADMIN — Medication 10 ML: at 09:58

## 2021-11-25 RX ADMIN — INSULIN LISPRO 3 UNITS: 100 INJECTION, SOLUTION INTRAVENOUS; SUBCUTANEOUS at 17:47

## 2021-11-25 RX ADMIN — LEVOTHYROXINE SODIUM 75 MCG: 0.07 TABLET ORAL at 06:02

## 2021-11-25 RX ADMIN — DOCUSATE SODIUM 50 MG AND SENNOSIDES 8.6 MG 2 TABLET: 8.6; 5 TABLET, FILM COATED ORAL at 21:04

## 2021-11-25 RX ADMIN — ALBUMIN (HUMAN) 25 G: 0.25 INJECTION, SOLUTION INTRAVENOUS at 17:46

## 2021-11-25 RX ADMIN — MIRTAZAPINE 15 MG: 15 TABLET, FILM COATED ORAL at 21:04

## 2021-11-25 RX ADMIN — ROPINIROLE HYDROCHLORIDE 0.25 MG: 0.25 TABLET, FILM COATED ORAL at 21:04

## 2021-11-25 RX ADMIN — OXYCODONE AND ACETAMINOPHEN 1 TABLET: 5; 325 TABLET ORAL at 18:09

## 2021-11-25 RX ADMIN — METOPROLOL TARTRATE 50 MG: 25 TABLET, FILM COATED ORAL at 21:04

## 2021-11-25 RX ADMIN — ARIPIPRAZOLE 5 MG: 5 TABLET ORAL at 21:04

## 2021-11-25 RX ADMIN — AMLODIPINE BESYLATE 5 MG: 5 TABLET ORAL at 09:57

## 2021-11-25 RX ADMIN — BUMETANIDE 2 MG: 1 TABLET ORAL at 09:56

## 2021-11-25 RX ADMIN — INSULIN LISPRO 6 UNITS: 100 INJECTION, SOLUTION INTRAVENOUS; SUBCUTANEOUS at 13:21

## 2021-11-25 RX ADMIN — PANTOPRAZOLE SODIUM 40 MG: 40 TABLET, DELAYED RELEASE ORAL at 06:02

## 2021-11-25 RX ADMIN — ALBUMIN (HUMAN) 25 G: 0.25 INJECTION, SOLUTION INTRAVENOUS at 09:50

## 2021-11-25 RX ADMIN — METOPROLOL TARTRATE 50 MG: 25 TABLET, FILM COATED ORAL at 09:56

## 2021-11-25 RX ADMIN — BUMETANIDE 2 MG: 1 TABLET ORAL at 21:04

## 2021-11-25 RX ADMIN — OXYCODONE AND ACETAMINOPHEN 2 TABLET: 5; 325 TABLET ORAL at 10:54

## 2021-11-25 ASSESSMENT — PAIN - FUNCTIONAL ASSESSMENT: PAIN_FUNCTIONAL_ASSESSMENT: PREVENTS OR INTERFERES SOME ACTIVE ACTIVITIES AND ADLS

## 2021-11-25 ASSESSMENT — PAIN SCALES - GENERAL
PAINLEVEL_OUTOF10: 7
PAINLEVEL_OUTOF10: 9
PAINLEVEL_OUTOF10: 9
PAINLEVEL_OUTOF10: 0

## 2021-11-25 ASSESSMENT — PAIN DESCRIPTION - FREQUENCY: FREQUENCY: CONTINUOUS

## 2021-11-25 ASSESSMENT — PAIN DESCRIPTION - PAIN TYPE: TYPE: ACUTE PAIN

## 2021-11-25 ASSESSMENT — PAIN DESCRIPTION - DESCRIPTORS: DESCRIPTORS: BURNING;CRAMPING

## 2021-11-25 ASSESSMENT — PAIN DESCRIPTION - LOCATION: LOCATION: ABDOMEN

## 2021-11-25 ASSESSMENT — PAIN DESCRIPTION - ONSET: ONSET: PROGRESSIVE

## 2021-11-25 ASSESSMENT — PAIN DESCRIPTION - PROGRESSION: CLINICAL_PROGRESSION: GRADUALLY WORSENING

## 2021-11-25 ASSESSMENT — PAIN DESCRIPTION - ORIENTATION: ORIENTATION: UPPER;MID;LEFT;RIGHT

## 2021-11-25 NOTE — DISCHARGE SUMMARY
[facility] Physician Discharge Summary       Xochitl Jiménez MD    Call in 1 day      Via Jose E Peguero MD  [address] [place] [address]    Call in 3 days        Activity level: As tolerated    Diet: ADULT DIET; Easy to Chew; 4 carb choices (60 gm/meal); Low Potassium (Less than 3000 mg/day)    Condition at discharge: Stable    Dispo: Home    Patient ID:  Tammie Duran  [id number]  36 y.o.  1990    Admit date: 11/22/2021    Discharge date and time:  11/25/2021  4:42 PM    Admission Diagnoses: Principal Problem:    AMS (altered mental status)  Active Problems:    MTHFR mutation    Generalized abdominal pain    Hypertension    ESRD (end stage renal disease) on dialysis ([facility]    Opioid overdose ([facility]    Anemia    Type 1 diabetes mellitus with chronic kidney disease on chronic dialysis (HCC)    Elevated TSH  Resolved Problems:    * No resolved hospital problems. *      Discharge Diagnoses: Principal Problem:    AMS (altered mental status)  Active Problems:    MTHFR mutation    Generalized abdominal pain    Hypertension    ESRD (end stage renal disease) on dialysis (HCC)    Opioid overdose (HCC)    Anemia    Type 1 diabetes mellitus with chronic kidney disease on chronic dialysis (HCC)    Elevated TSH  Resolved Problems:    * No resolved hospital problems. *      Consults: Nephrology    Procedures: None    Hospital Course: Patient is a 33-year-old female with history of end-stage renal disease admitted for acute mental status changes. CT of the head without contrast on 11/22/2021 reveals no acute intracranial hemorrhage mass-effect or midline shift. No abnormal extra-axial fluid collection the gray-white differentiation is maintained without evidence of acute infarct. There is no evidence of hydrocephalus.   CT of the abdomen and pelvis on 11/22/2021 shows a large amount of ascites diffuse colonic fecal retention and bilateral pleural effusions nasal mucosa and turbinates normal without polyps  Neck: supple and non-tender without mass, no thyromegaly or thyroid nodules, no cervical lymphadenopathy  Pulmonary/Chest: clear to auscultation bilaterally- no wheezes, rales or rhonchi, normal air movement, no respiratory distress  Cardiovascular: normal rate, regular rhythm, normal S1 and S2, no murmurs, rubs, clicks, or gallops, distal pulses intact, no carotid bruits  Abdomen: soft, mildly tender infra costal area, non-distended, normal bowel sounds, no masses or organomegaly  Extremities: no cyanosis, clubbing or edema  Musculoskeletal: normal range of motion, no joint swelling, deformity or tenderness  Neurologic: reflexes normal and symmetric, no cranial nerve deficit, gait, coordination and speech normal      LABS:  Recent Labs     11/22/21 1929 11/23/21 0202 11/23/21  1200 11/24/21  0852 11/25/21  0619     --   --  134 134   K 4.0  --   --  4.2 3.6   CL 93*  --   --  98 98   CO2 29  --   --  22 27   BUN 19  --   --  21* 12   CREATININE 4.4*  --   --  5.8* 3.6*   GLUCOSE 384*   < > 74 221* 120*   CALCIUM 8.4*  --   --  8.2* 7.8*    < > = values in this interval not displayed. Recent Labs     11/22/21 1929 11/22/21 1929 11/24/21  0852 11/25/21  0619 11/25/21  1551   WBC 9.7  --  10.7 8.0  --    RBC 2.62*  --  2.43* 2.38*  --    HGB 7.6*   < > 7.1* 6.9* 7.9*   HCT 23.5*   < > 23.0* 22.0* 24.5*   MCV 89.7  --  94.7 92.4  --    MCH 29.0  --  29.2 29.0  --    MCHC 32.3  --  30.9* 31.4*  --    RDW 18.4*  --  19.9* 19.4*  --      --  293 250  --    MPV 10.4  --  10.7 10.2  --     < > = values in this interval not displayed. No results for input(s): POCGLU in the last 72 hours.     Imaging:  CT HEAD WO CONTRAST    Result Date: 11/22/2021  EXAMINATION: CT OF THE HEAD WITHOUT CONTRAST  11/22/2021 8:14 pm TECHNIQUE: CT of the head was performed without the administration of intravenous contrast. Dose modulation, iterative reconstruction, and/or weight based adjustment of the mA/kV was utilized to reduce the radiation dose to as low as reasonably achievable. COMPARISON: None. HISTORY: ORDERING SYSTEM PROVIDED HISTORY: ams TECHNOLOGIST PROVIDED HISTORY: Reason for exam:->ams Has a \"code stroke\" or \"stroke alert\" been called? ->No Decision Support Exception - unselect if not a suspected or confirmed emergency medical condition->Emergency Medical Condition ([place]) FINDINGS: BRAIN/VENTRICLES: There is no acute intracranial hemorrhage, mass effect or midline shift. No abnormal extra-axial fluid collection. The gray-white differentiation is maintained without evidence of an acute infarct. There is no evidence of hydrocephalus. ORBITS: The visualized portion of the orbits demonstrate no acute abnormality. SINUSES: The visualized paranasal sinuses and mastoid air cells demonstrate no acute abnormality. SOFT TISSUES/SKULL:  No acute abnormality of the visualized skull or soft tissues. No acute intracranial abnormality. CT ABDOMEN PELVIS W IV CONTRAST Additional Contrast? None    Result Date: 11/22/2021  EXAMINATION: CT OF THE ABDOMEN AND PELVIS WITH CONTRAST 11/22/2021 10:07 pm TECHNIQUE: CT of the abdomen and pelvis was performed with the administration of intravenous contrast. Multiplanar reformatted images are provided for review. Dose modulation, iterative reconstruction, and/or weight based adjustment of the mA/kV was utilized to reduce the radiation dose to as low as reasonably achievable. COMPARISON: None. HISTORY: ORDERING SYSTEM PROVIDED HISTORY: abdominal pain TECHNOLOGIST PROVIDED HISTORY: Reason for exam:->abdominal pain Additional Contrast?->None Decision Support Exception - unselect if not a suspected or confirmed emergency medical condition->Emergency Medical Condition ([place]) FINDINGS: Lower Chest:   Small bilateral pleural effusions with adjacent atelectasis. Cardiomegaly. Organs:  The liver, spleen, adrenal glands, kidneys, pancreas unremarkable. Cholecystectomy. GI/Bowel: Diffuse colonic fecal retention. Normal small bowel. The appendix is not definitively visualized. Pelvis: Reyes catheter within a decompressed urinary bladder. Peritoneum/Retroperitoneum: Large amount of ascites. Bones/Soft Tissues: Normal osseous structures. Large amount of ascites. Diffuse colonic fecal retention. Bilateral pleural effusions with adjacent atelectasis. Cholecystectomy.          Patient Instructions:      Medication List      START taking these medications    sennosides-docusate sodium 8.6-50 MG tablet  Commonly known as: SENOKOT-S  Take 2 tablets by mouth nightly as needed for Constipation        CONTINUE taking these medications    amLODIPine 5 MG tablet  Commonly known as: NORVASC  Take 1 tablet by mouth daily     ARIPiprazole 5 MG tablet  Commonly known as: ABILIFY     Bumex 2 MG tablet  Generic drug: bumetanide     insulin glargine 100 UNIT/ML injection vial  Commonly known as: LANTUS  Inject 6 Units into the skin 2 times daily     levothyroxine 75 MCG tablet  Commonly known as: SYNTHROID  TAKE 1 TABLET BY MOUTH IN THE MORNING BEFORE BREAKFAST     metoprolol tartrate 50 MG tablet  Commonly known as: LOPRESSOR  Take 1 tablet by mouth 2 times daily     mirtazapine 15 MG tablet  Commonly known as: REMERON     pantoprazole 40 MG tablet  Commonly known as: PROTONIX     polyethylene glycol 17 g packet  Commonly known as: GLYCOLAX     rOPINIRole 0.25 MG tablet  Commonly known as: REQUIP     sevelamer 800 MG tablet  Commonly known as: RENVELA  Take 1 tablet by mouth 3 times daily (with meals) New lower dose     vitamin D 1.25 MG (44553 UT) Caps capsule  Commonly known as: ERGOCALCIFEROL  Take 1 capsule by mouth once a week           Where to Get Your Medications      You can get these medications from any pharmacy    You don't need a prescription for these medications  · sennosides-docusate sodium 8.6-50 MG tablet         NOTE: This report was transcribed using voice recognition software. Every effort was made to ensure accuracy; however, inadvertent computerized transcription errors may be present.      Signed:  Electronically signed by Neva GABRIELLA on 11/25/2021 at 4:42 PM

## 2021-11-25 NOTE — CONSULTS
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 She was minimally responsive and hypoxemic. She was found to have a blood sugar of 500 and UDS + for oxycodone which she does have a prescription for. CT head was negative, CT of the abdomen indicates constipation and patient continues to complain of abdominal pain, she has been evaluated by GI services on multiple occasions. Patient states she has been unable to eat for several days due to the abdominal pain. Labs on admission were significant for sodium 135, potassium 4.0, chloride 93, bicarbonate 29, BUN 19 and creatinine 4.4 mg/dl. We are consulted for dialysis management.       Past Medical History:        Diagnosis Date    Acute congestive heart failure ([address]     BC (acute kidney injury) ([address] 10/01/2019    Cardiac arrest ([facility] 02/15/2021    Cephalgia 10/09/2019    Chronic kidney disease     Depression     Diabetes mellitus ([address]     Diabetic gastroparesis associated with type 1 diabetes mellitus ([address] 12/17/2018    Diabetic ketoacidosis ([address] 08/27/2011    Diabetic ketoacidosis with coma associated with type 1 diabetes mellitus ([address] 06/26/2013    Diabetic polyneuropathy associated with type 1 diabetes mellitus ([facility] 2020    Drug use complicating pregnancy in third trimester     Endocarditis 10/31/2020    ESRD (end stage renal disease) ([facility] 2020    H/O cardiovascular stress test 2021    Lexiscan    Hemodialysis patient [facility])     History of blood transfusion 2019    Hyperlipidemia 10/08/2020    Hyperosmolar hyperglycemic state (HHS) ([facility] 2020    Hypothyroidism 10/08/2020    Iron deficiency anemia 10/01/2019    MDRO (multiple drug resistant organisms) resistance     MRSA (methicillin resistant Staphylococcus aureus)     back wound abcess    Non compliance w medication regimen 2016    Other disorders of kidney and ureter     Pregnancy 2016    16 weeks    Previous  delivery affecting pregnancy, antepartum 2017    Previous stillbirth or demise, antepartum 2016    Seizure ([facility] 2020    Severe pre-eclampsia in third trimester 2016    Shock liver 02/15/2021    Valvular endocarditis 11/10/2020    This Diagnosis was added to the Problem List based on transcribed orders from Dr. Paola Angelo        Past Surgical History:        Procedure Laterality Date    BACK SURGERY      abscess    CATHETER REMOVAL N/A 10/1/2021    PERITONEAL CATHETER REMOVAL performed by Ari Glass MD at [facility]      x2   [facility], LAPAROSCOPIC N/A 2019    CHOLECYSTECTOMY LAPAROSCOPIC performed by Emilio Dolan MD at [address] N/A 2018    COLONOSCOPY WITH BIOPSY performed by Kacie Herring MD at [facility] N/A 2018    COLONOSCOPY WITH BIOPSY performed by Xu Verde MD at [address] [facility]  2012    EF 57%    ECHO COMPL W DOP COLOR FLOW  6/10/2013         EMBOLECTOMY N/A 2020    ANGIOVACCarlos YULISSA -- REQS ROOM 3 [facility]    LEFT LEG INCISION AND DRAINAGE, DEBRIDEMENT, WOUND VAC APPLICATION performed by Eliza Porter DPM at [address] Left 5/18/2021    LEFT LEG DEBRIDEMENT BIOPSY POSS APPLICATION WOUND VAC performed by Eliza Porter DPM at [place] [address] N/A 6/26/2020    LAPAROSCOPIC INSERTION PERITONEAL DIALYSIS CATHETER performed by Riki Ugarte MD at [facility] ESOPHAGOGASTRODUODENOSCOPY TRANSORAL DIAGNOSTIC N/A 5/30/2018    EGD ESOPHAGOGASTRODUODENOSCOPY performed by Luz Alex MD at [address] [place] [address] TRANSESOPHAGEAL ECHOCARDIOGRAM N/A 10/19/2020    TRANSESOPHAGEAL ECHOCARDIOGRAM WITH BUBBLE STUDY performed by Yelitza Diamond MD at [address] [place] [address] TUNNELED VENOUS PORT PLACEMENT  04/2018    UPPER GASTROINTESTINAL ENDOSCOPY  12/18/2018    EGD BIOPSY performed by Miri Prieto MD at [address] [place] [address],[facility] N/A 10/11/2019    EGD ESOPHAGOGASTRODUODENOSCOPY performed by Hiram Booker DO at [address] [place] [address],[facility] N/A 7/14/2021    EGD BIOPSY performed by Julian Kumar MD at [place] [address]     Current Medications:    Current Facility-Administered Medications: oxyCODONE-acetaminophen (PERCOCET) 5-325 MG per tablet 1 tablet, 1 tablet, Oral, Q6H PRN  polyethylene glycol (GLYCOLAX) packet 17 g, 17 g, Oral, Daily  sennosides-docusate sodium (SENOKOT-S) 8.6-50 MG tablet 2 tablet, 2 tablet, Oral, Nightly  epoetin nena-epbx (RETACRIT) injection 10,000 Units, 10,000 Units, SubCUTAneous, Q MWF  insulin lispro (HUMALOG) injection vial 0-6 Units, 0-6 Units, SubCUTAneous, TID WC  insulin lispro (HUMALOG) injection vial 0-3 Units, 0-3 Units, SubCUTAneous, Nightly  glucose (GLUTOSE) 40 % oral gel 15 g, 15 g, Oral, PRN  dextrose 50 % IV solution, 12.5 g, IntraVENous, PRN  glucagon (rDNA) injection 1 mg, 1 mg, IntraMUSCular, PRN  dextrose 5 % solution, 100 mL/hr, IntraVENous, PRN  sodium chloride flush 0.9 % injection 10 mL, 10 mL, IntraVENous, 2 times per day  sodium chloride flush 0.9 % injection 10 mL, 10 mL, IntraVENous, PRN  0.9 % sodium chloride infusion, 25 mL, IntraVENous, PRN  promethazine (PHENERGAN) tablet 12.5 mg, 12.5 mg, Oral, Q6H PRN **OR** ondansetron (ZOFRAN) injection 4 mg, 4 mg, IntraVENous, Q6H PRN  acetaminophen (TYLENOL) tablet 650 mg, 650 mg, Oral, Q6H PRN **OR** acetaminophen (TYLENOL) suppository 650 mg, 650 mg, Rectal, Q6H PRN  rOPINIRole (REQUIP) tablet 0.25 mg, 0.25 mg, Oral, Nightly  mirtazapine (REMERON) tablet 15 mg, 15 mg, Oral, Nightly  ARIPiprazole (ABILIFY) tablet 5 mg, 5 mg, Oral, Nightly  sevelamer (RENVELA) tablet 800 mg, 800 mg, Oral, TID WC  [START ON 11/30/2021] vitamin D (ERGOCALCIFEROL) capsule 50,000 Units, 50,000 Units, Oral, Weekly  bumetanide (BUMEX) tablet 2 mg, 2 mg, Oral, BID  metoprolol tartrate (LOPRESSOR) tablet 50 mg, 50 mg, Oral, BID  amLODIPine (NORVASC) tablet 5 mg, 5 mg, Oral, Daily  levothyroxine (SYNTHROID) tablet 75 mcg, 75 mcg, Oral, Daily  pantoprazole (PROTONIX) tablet 40 mg, 40 mg, Oral, QAM AC  famotidine (PEPCID) tablet 20 mg, 20 mg, Oral, Daily PRN  Allergies:  Cefepime and Toradol [ketorolac tromethamine]    Social History:    TOBACCO:   reports that she quit smoking about 9 months ago. Her smoking use included cigarettes. She has a 3.00 pack-year smoking history. She has never used smokeless tobacco.  ETOH:   reports no history of alcohol use.     Family History:       Problem Relation Age of Onset   Josseline Santos Asthma Mother     Hypertension Mother     High Blood Pressure Mother     Diabetes Mother     Asthma Brother     High Blood Pressure Father      REVIEW OF SYSTEMS:    CONSTITUTIONAL:  positive for  fatigue and malaise  EYES:  negative  HEENT:  negative for  hearing loss and epistaxis  RESPIRATORY:  positive for dyspnea  CARDIOVASCULAR:  negative for  chest pain, dyspnea  GASTROINTESTINAL:  positive for nausea  GENITOURINARY:  negative  INTEGUMENT/BREAST:  negative  HEMATOLOGIC/LYMPHATIC:  negative  ALLERGIC/IMMUNOLOGIC:  positive for drug reactions  ENDOCRINE:  positive for weight changes    MUSCULOSKELETAL:  negative  NEUROLOGICAL:  negative  PHYSICAL EXAM:      Vitals:    VITALS:  /67   Pulse 95   Temp 98.2 °F (36.8 °C) (Oral)   Resp 16   Ht 5' 4\" (1.626 m)   Wt 149 lb 14.6 oz (68 kg)   SpO2 97%   BMI 25.73 kg/m²   24HR INTAKE/OUTPUT:    Intake/Output Summary (Last 24 hours) at 11/25/2021 6918  Last data filed at 11/24/2021 1700  Gross per 24 hour   Intake 420 ml   Output 2100 ml   Net -1680 ml       Access:  RIJ   tunneled dialysis catheter  Constitutional: Awake in no acute distress  HEENT:  Normocephalic, PERRL  Respiratory: Decreased breath sounds on the basis  Cardiovascular/Edema:  RRR, S1/S2  Gastrointestinal:  Soft, non-tender  Neurologic:  Nonfocal, AVELAR  Skin:  Warm, dry, no lesions  Other:  no edema     DATA:    CBC:   Lab Results   Component Value Date    WBC 10.7 11/24/2021    RBC 2.43 11/24/2021    HGB 7.1 11/24/2021    HCT 23.0 11/24/2021    MCV 94.7 11/24/2021    MCH 29.2 11/24/2021    MCHC 30.9 11/24/2021    RDW 19.9 11/24/2021     11/24/2021    MPV 10.7 11/24/2021     CMP:    Lab Results   Component Value Date     11/24/2021    K 4.2 11/24/2021    CL 98 11/24/2021    CO2 22 11/24/2021    BUN 21 11/24/2021    CREATININE 5.8 11/24/2021    GFRAA 10 11/24/2021    LABGLOM 10 11/24/2021    GLUCOSE 221 11/24/2021    GLUCOSE 130 05/18/2012    PROT 5.0 11/24/2021    LABALBU 2.5 11/24/2021    LABALBU 4.1 05/18/2012    CALCIUM 8.2 11/24/2021    BILITOT <0.2 11/24/2021    ALKPHOS 109 11/24/2021    AST 6 11/24/2021    ALT <5 11/24/2021     Magnesium:    Lab Results   Component Value Date    MG 2.0 11/22/2021     Phosphorus:    Lab Results   Component Value Date    PHOS 3.4 11/15/2021     Radiology Review:    Impression   1.  Ill-defined opacities in the bilateral lower lungs similar to prior exam.       2.  Right chest wall catheter.                 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 She was minimally responsive and hypoxemic. She was found to have a blood sugar of 500 and UDS + for oxycodone which she does have a prescription for. CT head was negative, CT of the abdomen indicates constipation and patient continues to complain of abdominal pain, she has been evaluated by GI services on multiple occasions. Patient states she has been unable to eat for several days due to the abdominal pain. Labs on admission were significant for sodium 135, potassium 4.0, chloride 93, bicarbonate 29, BUN 19 and creatinine 4.4 mg/dl. We are consulted for dialysis management. 1. ESRD on home HD dialysis training, to continue HD 3 times a week while in the hospital via [address] tunneled dialysis catheter. 2. HTN, on metoprolol  3. MBD of CKD, hold sevelamer   4. Anemia of CKD, on epoetin alpha    5. Restless leg syndrome, on ropinirole  -----------------------------------------------------------  6. Altered mental status, resolved, Percocet use? 7. History of gastroparesis, on metoclopramide  8.  On going abdominal pain, GI has evaluated, constipation and narcotic use     Plan:     · HD tomorrow and 3 times a week MWF  · Monitor daily labs  · Give albumin 25 g IV every 8 hours x3 doses  · Epoetin alpha 96889 units 3 times a week  · Continue to monitor magnesium and phosphorus levels  · Obtain ionized calcium level      Thank you for allowing us to participate in the care of Ms.  [address] [facility]  Electronically signed by HEBER Mckinney CNP on 11/25/2021 at 6:14 AM

## 2021-11-27 LAB
BLOOD CULTURE, ROUTINE: NORMAL
CULTURE, BLOOD 2: NORMAL

## 2021-11-29 LAB
BLOOD BANK DISPENSE STATUS: NORMAL
BLOOD BANK PRODUCT CODE: NORMAL
BPU ID: NORMAL
DESCRIPTION BLOOD BANK: NORMAL

## 2021-12-07 ENCOUNTER — APPOINTMENT (OUTPATIENT)
Dept: CT IMAGING | Age: 31
DRG: 283 | End: 2021-12-07
Payer: COMMERCIAL

## 2021-12-07 ENCOUNTER — HOSPITAL ENCOUNTER (INPATIENT)
Age: 31
LOS: 5 days | Discharge: HOME | DRG: 283 | End: 2021-12-12
Attending: EMERGENCY MEDICINE | Admitting: INTERNAL MEDICINE
Payer: COMMERCIAL

## 2021-12-07 ENCOUNTER — APPOINTMENT (OUTPATIENT)
Dept: GENERAL RADIOLOGY | Age: 31
DRG: 283 | End: 2021-12-07
Payer: COMMERCIAL

## 2021-12-07 DIAGNOSIS — R07.9 CHEST PAIN, UNSPECIFIED TYPE: ICD-10-CM

## 2021-12-07 DIAGNOSIS — N18.6 ESRD ON HEMODIALYSIS (HCC): ICD-10-CM

## 2021-12-07 DIAGNOSIS — R10.9 ABDOMINAL PAIN, UNSPECIFIED ABDOMINAL LOCATION: Primary | ICD-10-CM

## 2021-12-07 DIAGNOSIS — Z99.2 ESRD ON HEMODIALYSIS (HCC): ICD-10-CM

## 2021-12-07 DIAGNOSIS — R11.2 NAUSEA AND VOMITING, INTRACTABILITY OF VOMITING NOT SPECIFIED, UNSPECIFIED VOMITING TYPE: ICD-10-CM

## 2021-12-07 DIAGNOSIS — E87.6 HYPOKALEMIA: ICD-10-CM

## 2021-12-07 PROBLEM — R18.8 ASCITES: Status: ACTIVE | Noted: 2021-12-07

## 2021-12-07 LAB
ALBUMIN SERPL-MCNC: 3.1 G/DL (ref 3.5–5.2)
ALP BLD-CCNC: 129 U/L (ref 35–104)
ALT SERPL-CCNC: <5 U/L (ref 0–32)
ANION GAP SERPL CALCULATED.3IONS-SCNC: 15 MMOL/L (ref 7–16)
ANION GAP SERPL CALCULATED.3IONS-SCNC: 19 MMOL/L (ref 7–16)
AST SERPL-CCNC: 5 U/L (ref 0–31)
BASOPHILS ABSOLUTE: 0.07 E9/L (ref 0–0.2)
BASOPHILS RELATIVE PERCENT: 1.1 % (ref 0–2)
BILIRUB SERPL-MCNC: <0.2 MG/DL (ref 0–1.2)
BUN BLDV-MCNC: 10 MG/DL (ref 6–20)
BUN BLDV-MCNC: 14 MG/DL (ref 6–20)
CALCIUM SERPL-MCNC: 8.5 MG/DL (ref 8.6–10.2)
CALCIUM SERPL-MCNC: 9 MG/DL (ref 8.6–10.2)
CHLORIDE BLD-SCNC: 92 MMOL/L (ref 98–107)
CHLORIDE BLD-SCNC: 92 MMOL/L (ref 98–107)
CO2: 20 MMOL/L (ref 22–29)
CO2: 26 MMOL/L (ref 22–29)
CREAT SERPL-MCNC: 4.4 MG/DL (ref 0.5–1)
CREAT SERPL-MCNC: 5 MG/DL (ref 0.5–1)
EKG ATRIAL RATE: 104 BPM
EKG P AXIS: 33 DEGREES
EKG P-R INTERVAL: 126 MS
EKG Q-T INTERVAL: 368 MS
EKG QRS DURATION: 90 MS
EKG QTC CALCULATION (BAZETT): 483 MS
EKG R AXIS: 30 DEGREES
EKG T AXIS: -111 DEGREES
EKG VENTRICULAR RATE: 104 BPM
EOSINOPHILS ABSOLUTE: 0.14 E9/L (ref 0.05–0.5)
EOSINOPHILS RELATIVE PERCENT: 2.2 % (ref 0–6)
GFR AFRICAN AMERICAN: 12
GFR AFRICAN AMERICAN: 14
GFR NON-AFRICAN AMERICAN: 12 ML/MIN/1.73
GFR NON-AFRICAN AMERICAN: 14 ML/MIN/1.73
GLUCOSE BLD-MCNC: 332 MG/DL (ref 74–99)
GLUCOSE BLD-MCNC: 438 MG/DL (ref 74–99)
GONADOTROPIN, CHORIONIC (HCG) QUANT: 1.1 MIU/ML
HCT VFR BLD CALC: 31.2 % (ref 34–48)
HEMOGLOBIN: 9.9 G/DL (ref 11.5–15.5)
IMMATURE GRANULOCYTES #: 0.02 E9/L
IMMATURE GRANULOCYTES %: 0.3 % (ref 0–5)
LACTIC ACID: 0.8 MMOL/L (ref 0.5–2.2)
LIPASE: 6 U/L (ref 13–60)
LYMPHOCYTES ABSOLUTE: 1.05 E9/L (ref 1.5–4)
LYMPHOCYTES RELATIVE PERCENT: 16.4 % (ref 20–42)
MAGNESIUM: 1.8 MG/DL (ref 1.6–2.6)
MAGNESIUM: 1.8 MG/DL (ref 1.6–2.6)
MCH RBC QN AUTO: 27.3 PG (ref 26–35)
MCHC RBC AUTO-ENTMCNC: 31.7 % (ref 32–34.5)
MCV RBC AUTO: 86 FL (ref 80–99.9)
METER GLUCOSE: 399 MG/DL (ref 74–99)
METER GLUCOSE: 400 MG/DL (ref 74–99)
MONOCYTES ABSOLUTE: 0.46 E9/L (ref 0.1–0.95)
MONOCYTES RELATIVE PERCENT: 7.2 % (ref 2–12)
NEUTROPHILS ABSOLUTE: 4.66 E9/L (ref 1.8–7.3)
NEUTROPHILS RELATIVE PERCENT: 72.8 % (ref 43–80)
PDW BLD-RTO: 16.6 FL (ref 11.5–15)
PHOSPHORUS: 3.3 MG/DL (ref 2.5–4.5)
PLATELET # BLD: 218 E9/L (ref 130–450)
PMV BLD AUTO: 11.1 FL (ref 7–12)
POTASSIUM SERPL-SCNC: 2.8 MMOL/L (ref 3.5–5)
POTASSIUM SERPL-SCNC: 3.2 MMOL/L (ref 3.5–5)
RBC # BLD: 3.63 E12/L (ref 3.5–5.5)
REASON FOR REJECTION: NORMAL
REJECTED TEST: NORMAL
SARS-COV-2, NAAT: NOT DETECTED
SODIUM BLD-SCNC: 131 MMOL/L (ref 132–146)
SODIUM BLD-SCNC: 133 MMOL/L (ref 132–146)
TOTAL PROTEIN: 6.1 G/DL (ref 6.4–8.3)
TROPONIN, HIGH SENSITIVITY: 159 NG/L (ref 0–9)
TROPONIN, HIGH SENSITIVITY: 189 NG/L (ref 0–9)
WBC # BLD: 6.4 E9/L (ref 4.5–11.5)

## 2021-12-07 PROCEDURE — 80048 BASIC METABOLIC PNL TOTAL CA: CPT

## 2021-12-07 PROCEDURE — 2580000003 HC RX 258: Performed by: PHYSICIAN ASSISTANT

## 2021-12-07 PROCEDURE — 6360000002 HC RX W HCPCS: Performed by: STUDENT IN AN ORGANIZED HEALTH CARE EDUCATION/TRAINING PROGRAM

## 2021-12-07 PROCEDURE — 85025 COMPLETE CBC W/AUTO DIFF WBC: CPT

## 2021-12-07 PROCEDURE — 83690 ASSAY OF LIPASE: CPT

## 2021-12-07 PROCEDURE — 84702 CHORIONIC GONADOTROPIN TEST: CPT

## 2021-12-07 PROCEDURE — 71045 X-RAY EXAM CHEST 1 VIEW: CPT

## 2021-12-07 PROCEDURE — 6370000000 HC RX 637 (ALT 250 FOR IP): Performed by: STUDENT IN AN ORGANIZED HEALTH CARE EDUCATION/TRAINING PROGRAM

## 2021-12-07 PROCEDURE — 83735 ASSAY OF MAGNESIUM: CPT

## 2021-12-07 PROCEDURE — 87635 SARS-COV-2 COVID-19 AMP PRB: CPT

## 2021-12-07 PROCEDURE — 83605 ASSAY OF LACTIC ACID: CPT

## 2021-12-07 PROCEDURE — 2060000000 HC ICU INTERMEDIATE R&B

## 2021-12-07 PROCEDURE — 96361 HYDRATE IV INFUSION ADD-ON: CPT

## 2021-12-07 PROCEDURE — 6360000002 HC RX W HCPCS: Performed by: INTERNAL MEDICINE

## 2021-12-07 PROCEDURE — 80053 COMPREHEN METABOLIC PANEL: CPT

## 2021-12-07 PROCEDURE — 2500000003 HC RX 250 WO HCPCS: Performed by: INTERNAL MEDICINE

## 2021-12-07 PROCEDURE — 93010 ELECTROCARDIOGRAM REPORT: CPT | Performed by: INTERNAL MEDICINE

## 2021-12-07 PROCEDURE — 74176 CT ABD & PELVIS W/O CONTRAST: CPT

## 2021-12-07 PROCEDURE — 84100 ASSAY OF PHOSPHORUS: CPT

## 2021-12-07 PROCEDURE — 93005 ELECTROCARDIOGRAM TRACING: CPT | Performed by: NURSE PRACTITIONER

## 2021-12-07 PROCEDURE — 84484 ASSAY OF TROPONIN QUANT: CPT

## 2021-12-07 PROCEDURE — 82962 GLUCOSE BLOOD TEST: CPT

## 2021-12-07 PROCEDURE — 6370000000 HC RX 637 (ALT 250 FOR IP): Performed by: INTERNAL MEDICINE

## 2021-12-07 PROCEDURE — 96374 THER/PROPH/DIAG INJ IV PUSH: CPT

## 2021-12-07 PROCEDURE — 96375 TX/PRO/DX INJ NEW DRUG ADDON: CPT

## 2021-12-07 PROCEDURE — 99285 EMERGENCY DEPT VISIT HI MDM: CPT

## 2021-12-07 RX ORDER — ACETAMINOPHEN 325 MG/1
650 TABLET ORAL EVERY 6 HOURS PRN
Status: DISCONTINUED | OUTPATIENT
Start: 2021-12-07 | End: 2021-12-12 | Stop reason: HOSPADM

## 2021-12-07 RX ORDER — 0.9 % SODIUM CHLORIDE 0.9 %
1000 INTRAVENOUS SOLUTION INTRAVENOUS ONCE
Status: COMPLETED | OUTPATIENT
Start: 2021-12-07 | End: 2021-12-07

## 2021-12-07 RX ORDER — PANTOPRAZOLE SODIUM 40 MG/1
40 TABLET, DELAYED RELEASE ORAL DAILY PRN
Status: DISCONTINUED | OUTPATIENT
Start: 2021-12-07 | End: 2021-12-08

## 2021-12-07 RX ORDER — CLINDAMYCIN PHOSPHATE 600 MG/50ML
600 INJECTION INTRAVENOUS EVERY 8 HOURS
Status: DISCONTINUED | OUTPATIENT
Start: 2021-12-07 | End: 2021-12-12 | Stop reason: HOSPADM

## 2021-12-07 RX ORDER — SEVELAMER CARBONATE 800 MG/1
800 TABLET, FILM COATED ORAL
Status: DISCONTINUED | OUTPATIENT
Start: 2021-12-07 | End: 2021-12-12 | Stop reason: HOSPADM

## 2021-12-07 RX ORDER — BUMETANIDE 1 MG/1
2 TABLET ORAL 2 TIMES DAILY
Status: DISCONTINUED | OUTPATIENT
Start: 2021-12-07 | End: 2021-12-12 | Stop reason: HOSPADM

## 2021-12-07 RX ORDER — POLYETHYLENE GLYCOL 3350 17 G/17G
17 POWDER, FOR SOLUTION ORAL DAILY PRN
Status: DISCONTINUED | OUTPATIENT
Start: 2021-12-07 | End: 2021-12-12 | Stop reason: HOSPADM

## 2021-12-07 RX ORDER — DEXTROSE MONOHYDRATE 25 G/50ML
12.5 INJECTION, SOLUTION INTRAVENOUS PRN
Status: DISCONTINUED | OUTPATIENT
Start: 2021-12-07 | End: 2021-12-10 | Stop reason: SDUPTHER

## 2021-12-07 RX ORDER — POLYETHYLENE GLYCOL 3350 17 G/17G
17 POWDER, FOR SOLUTION ORAL DAILY PRN
Status: DISCONTINUED | OUTPATIENT
Start: 2021-12-07 | End: 2021-12-07 | Stop reason: SDUPTHER

## 2021-12-07 RX ORDER — NICOTINE POLACRILEX 4 MG
15 LOZENGE BUCCAL PRN
Status: DISCONTINUED | OUTPATIENT
Start: 2021-12-07 | End: 2021-12-10 | Stop reason: SDUPTHER

## 2021-12-07 RX ORDER — AMLODIPINE BESYLATE 5 MG/1
5 TABLET ORAL DAILY
Status: DISCONTINUED | OUTPATIENT
Start: 2021-12-07 | End: 2021-12-12 | Stop reason: HOSPADM

## 2021-12-07 RX ORDER — SENNA AND DOCUSATE SODIUM 50; 8.6 MG/1; MG/1
2 TABLET, FILM COATED ORAL NIGHTLY PRN
Status: DISCONTINUED | OUTPATIENT
Start: 2021-12-07 | End: 2021-12-12 | Stop reason: HOSPADM

## 2021-12-07 RX ORDER — POTASSIUM CHLORIDE 20 MEQ/1
40 TABLET, EXTENDED RELEASE ORAL ONCE
Status: DISCONTINUED | OUTPATIENT
Start: 2021-12-07 | End: 2021-12-07

## 2021-12-07 RX ORDER — METOCLOPRAMIDE HYDROCHLORIDE 5 MG/ML
10 INJECTION INTRAMUSCULAR; INTRAVENOUS ONCE
Status: COMPLETED | OUTPATIENT
Start: 2021-12-07 | End: 2021-12-07

## 2021-12-07 RX ORDER — ONDANSETRON 2 MG/ML
4 INJECTION INTRAMUSCULAR; INTRAVENOUS EVERY 6 HOURS PRN
Status: DISCONTINUED | OUTPATIENT
Start: 2021-12-07 | End: 2021-12-12 | Stop reason: HOSPADM

## 2021-12-07 RX ORDER — ERGOCALCIFEROL 1.25 MG/1
50000 CAPSULE ORAL WEEKLY
Status: DISCONTINUED | OUTPATIENT
Start: 2021-12-08 | End: 2021-12-12 | Stop reason: HOSPADM

## 2021-12-07 RX ORDER — SODIUM CHLORIDE 0.9 % (FLUSH) 0.9 %
5-40 SYRINGE (ML) INJECTION PRN
Status: DISCONTINUED | OUTPATIENT
Start: 2021-12-07 | End: 2021-12-12 | Stop reason: HOSPADM

## 2021-12-07 RX ORDER — METOPROLOL TARTRATE 50 MG/1
50 TABLET, FILM COATED ORAL 2 TIMES DAILY
Status: DISCONTINUED | OUTPATIENT
Start: 2021-12-07 | End: 2021-12-12 | Stop reason: HOSPADM

## 2021-12-07 RX ORDER — DEXTROSE MONOHYDRATE 50 MG/ML
100 INJECTION, SOLUTION INTRAVENOUS PRN
Status: DISCONTINUED | OUTPATIENT
Start: 2021-12-07 | End: 2021-12-10 | Stop reason: SDUPTHER

## 2021-12-07 RX ORDER — INSULIN GLARGINE 100 [IU]/ML
6 INJECTION, SOLUTION SUBCUTANEOUS 2 TIMES DAILY
Status: DISCONTINUED | OUTPATIENT
Start: 2021-12-07 | End: 2021-12-09

## 2021-12-07 RX ORDER — OXYCODONE HYDROCHLORIDE AND ACETAMINOPHEN 5; 325 MG/1; MG/1
1 TABLET ORAL ONCE
Status: COMPLETED | OUTPATIENT
Start: 2021-12-07 | End: 2021-12-07

## 2021-12-07 RX ORDER — ROPINIROLE 0.25 MG/1
0.25 TABLET, FILM COATED ORAL NIGHTLY
Status: DISCONTINUED | OUTPATIENT
Start: 2021-12-07 | End: 2021-12-12 | Stop reason: HOSPADM

## 2021-12-07 RX ORDER — ACETAMINOPHEN 650 MG/1
650 SUPPOSITORY RECTAL EVERY 6 HOURS PRN
Status: DISCONTINUED | OUTPATIENT
Start: 2021-12-07 | End: 2021-12-12 | Stop reason: HOSPADM

## 2021-12-07 RX ORDER — SODIUM CHLORIDE 9 MG/ML
25 INJECTION, SOLUTION INTRAVENOUS PRN
Status: DISCONTINUED | OUTPATIENT
Start: 2021-12-07 | End: 2021-12-12 | Stop reason: HOSPADM

## 2021-12-07 RX ORDER — ONDANSETRON 4 MG/1
4 TABLET, ORALLY DISINTEGRATING ORAL EVERY 8 HOURS PRN
Status: DISCONTINUED | OUTPATIENT
Start: 2021-12-07 | End: 2021-12-12 | Stop reason: HOSPADM

## 2021-12-07 RX ORDER — LEVOFLOXACIN 5 MG/ML
500 INJECTION, SOLUTION INTRAVENOUS EVERY 24 HOURS
Status: DISCONTINUED | OUTPATIENT
Start: 2021-12-07 | End: 2021-12-08 | Stop reason: DRUGHIGH

## 2021-12-07 RX ORDER — DIPHENHYDRAMINE HYDROCHLORIDE 50 MG/ML
25 INJECTION INTRAMUSCULAR; INTRAVENOUS ONCE
Status: COMPLETED | OUTPATIENT
Start: 2021-12-07 | End: 2021-12-07

## 2021-12-07 RX ORDER — LEVOTHYROXINE SODIUM 0.07 MG/1
75 TABLET ORAL DAILY
Status: DISCONTINUED | OUTPATIENT
Start: 2021-12-08 | End: 2021-12-12 | Stop reason: HOSPADM

## 2021-12-07 RX ORDER — SODIUM CHLORIDE 0.9 % (FLUSH) 0.9 %
5-40 SYRINGE (ML) INJECTION EVERY 12 HOURS SCHEDULED
Status: DISCONTINUED | OUTPATIENT
Start: 2021-12-07 | End: 2021-12-12 | Stop reason: HOSPADM

## 2021-12-07 RX ADMIN — SODIUM CHLORIDE 1000 ML: 9 INJECTION, SOLUTION INTRAVENOUS at 13:46

## 2021-12-07 RX ADMIN — OXYCODONE AND ACETAMINOPHEN 1 TABLET: 5; 325 TABLET ORAL at 18:55

## 2021-12-07 RX ADMIN — INSULIN GLARGINE 6 UNITS: 100 INJECTION, SOLUTION SUBCUTANEOUS at 21:37

## 2021-12-07 RX ADMIN — ROPINIROLE HYDROCHLORIDE 0.25 MG: 0.25 TABLET, FILM COATED ORAL at 21:38

## 2021-12-07 RX ADMIN — INSULIN LISPRO 3 UNITS: 100 INJECTION, SOLUTION INTRAVENOUS; SUBCUTANEOUS at 21:50

## 2021-12-07 RX ADMIN — CLINDAMYCIN PHOSPHATE 600 MG: 600 INJECTION, SOLUTION INTRAVENOUS at 18:55

## 2021-12-07 RX ADMIN — LEVOFLOXACIN 500 MG: 5 INJECTION, SOLUTION INTRAVENOUS at 19:38

## 2021-12-07 RX ADMIN — DIPHENHYDRAMINE HYDROCHLORIDE 25 MG: 50 INJECTION, SOLUTION INTRAMUSCULAR; INTRAVENOUS at 13:48

## 2021-12-07 RX ADMIN — AMLODIPINE BESYLATE 5 MG: 5 TABLET ORAL at 18:56

## 2021-12-07 RX ADMIN — BUMETANIDE 2 MG: 1 TABLET ORAL at 21:38

## 2021-12-07 RX ADMIN — METOCLOPRAMIDE HYDROCHLORIDE 10 MG: 5 INJECTION INTRAMUSCULAR; INTRAVENOUS at 13:47

## 2021-12-07 RX ADMIN — INSULIN LISPRO 6 UNITS: 100 INJECTION, SOLUTION INTRAVENOUS; SUBCUTANEOUS at 19:37

## 2021-12-07 RX ADMIN — METOPROLOL TARTRATE 50 MG: 50 TABLET, FILM COATED ORAL at 21:38

## 2021-12-07 ASSESSMENT — ENCOUNTER SYMPTOMS
SINUS PRESSURE: 0
NAUSEA: 1
COUGH: 0
WHEEZING: 0
DIARRHEA: 1
SHORTNESS OF BREATH: 1
VOMITING: 1
SORE THROAT: 0
EYE DISCHARGE: 0
EYE PAIN: 0
ABDOMINAL PAIN: 1
BACK PAIN: 0

## 2021-12-07 ASSESSMENT — PAIN DESCRIPTION - LOCATION: LOCATION: ABDOMEN

## 2021-12-07 ASSESSMENT — PAIN SCALES - GENERAL
PAINLEVEL_OUTOF10: 9
PAINLEVEL_OUTOF10: 8
PAINLEVEL_OUTOF10: 9

## 2021-12-07 ASSESSMENT — PAIN DESCRIPTION - FREQUENCY: FREQUENCY: CONTINUOUS

## 2021-12-07 ASSESSMENT — PAIN DESCRIPTION - DESCRIPTORS: DESCRIPTORS: CRAMPING

## 2021-12-07 ASSESSMENT — PAIN DESCRIPTION - ORIENTATION: ORIENTATION: OTHER (COMMENT)

## 2021-12-07 ASSESSMENT — PAIN DESCRIPTION - PAIN TYPE: TYPE: ACUTE PAIN

## 2021-12-07 NOTE — CONSULTS
Department of Internal Medicine  Nephrology Nurse Practitioner Consult Note      Reason for Consult:  ESRD on hemodialysis     CHIEF COMPLAINT:  Chest pain, SOB, nausea, vomiting and weakness    History Obtained From:  patient, electronic medical record    HISTORY OF PRESENT ILLNESS: Briefly, Miss Brad Osborne is a 36year-old female with a PMH of ESRD on home hemodialysis 5 days/wk, (initiated September 2021, Type I DM, poorly controlled with recurrent admissions for DKA, HTN, gastroparesis, ascites, infective endocarditis, cardiac arrest, hypothyroidism and depression. She was admitted to [facility] on December 7, 2021, after presenting to the ED with complaints of chest pain, SOB, nausea and vomiting x 1 week, weakness and decreased appetite. She denies missing any hemodialysis treatments. We are consulted for dialysis management.     Past Medical History:        Diagnosis Date    Acute congestive heart failure ([address]     BC (acute kidney injury) ([address] 10/01/2019    Cardiac arrest ([address] 02/15/2021    Cephalgia 10/09/2019    Chronic kidney disease     Depression     Diabetes mellitus ([address]     Diabetic gastroparesis associated with type 1 diabetes mellitus ([address] 12/17/2018    Diabetic ketoacidosis ([address] 08/27/2011    Diabetic ketoacidosis with coma associated with type 1 diabetes mellitus ([address] 06/26/2013    Diabetic polyneuropathy associated with type 1 diabetes mellitus ([address] 12/27/2020    Drug use complicating pregnancy in third trimester     Endocarditis 10/31/2020    ESRD (end stage renal disease) ([address] 09/29/2020    H/O cardiovascular stress test 08/27/2021    Lexiscan    Hemodialysis patient [facility])     History of blood transfusion 11/2019    Hyperlipidemia 10/08/2020    Hyperosmolar hyperglycemic state (HHS) ([address] 11/20/2020    Hypothyroidism 10/08/2020    Iron deficiency anemia 10/01/2019    MDRO (multiple drug resistant organisms) resistance     MRSA (methicillin resistant Staphylococcus aureus)     back wound abcess    Non compliance w medication regimen 2016    Other disorders of kidney and ureter     Pregnancy 2016    16 weeks    Previous  delivery affecting pregnancy, antepartum 2017    Previous stillbirth or demise, antepartum 2016    Seizure ([address] 2020    Severe pre-eclampsia in third trimester 2016    Shock liver 02/15/2021    Valvular endocarditis 11/10/2020    This Diagnosis was added to the Problem List based on transcribed orders from Dr. Dagoberto Najjar        Past Surgical History:        Procedure Laterality Date    BACK SURGERY      abscess    CATHETER REMOVAL N/A 10/1/2021    PERITONEAL CATHETER REMOVAL performed by Matthew Ladd MD at [address], LAPAROSCOPIC N/A 2019    CHOLECYSTECTOMY LAPAROSCOPIC performed by Ajay Cadet MD at [address] [facility] N/A 2018    COLONOSCOPY WITH BIOPSY performed by Sofi Almodovar MD at [address] [facility] N/A 2018    COLONOSCOPY WITH BIOPSY performed by Flower Sandra MD at [address] [facility] [address]  2012    EF 57%    ECHO COMPL W DOP COLOR FLOW  6/10/2013         EMBOLECTOMY N/A 2020    [address] [facility], [address] [facility], [place] -- REQS ROOM 3 performed by Sid Lambert MD at [address] [place] [address] Left 2021    LEFT LEG INCISION AND DRAINAGE, DEBRIDEMENT, WOUND VAC APPLICATION performed by Kate Vega DPM at [address] [place] [address] Left 2021    LEFT LEG DEBRIDEMENT BIOPSY POSS APPLICATION WOUND VAC performed by Kate Vega DPM at [place] [address] N/A 2020    LAPAROSCOPIC INSERTION PERITONEAL DIALYSIS CATHETER performed by Antonio Aguayo MD at [facility] [address] ESOPHAGOGASTRODUODENOSCOPY TRANSORAL DIAGNOSTIC N/A 2018    EGD ESOPHAGOGASTRODUODENOSCOPY performed by Sofi Almodovar MD at [address] [facility] TRANSESOPHAGEAL ECHOCARDIOGRAM N/A 10/19/2020    TRANSESOPHAGEAL ECHOCARDIOGRAM WITH BUBBLE STUDY performed by Jasmyne Guillen MD at [address] [facility] TUNNELED VENOUS PORT PLACEMENT  04/2018    UPPER GASTROINTESTINAL ENDOSCOPY  12/18/2018    EGD BIOPSY performed by Magdi Clay MD at [address] [facility] [address] 10/11/2019    EGD ESOPHAGOGASTRODUODENOSCOPY performed by Aida James DO at [address] N/A 7/14/2021    EGD BIOPSY performed by Alie Booth MD at [facility] ENDOSCOPY     Current Medications:    Current Facility-Administered Medications: 0.9 % sodium chloride bolus, 1,000 mL, IntraVENous, Once  metoclopramide (REGLAN) injection 10 mg, 10 mg, IntraVENous, Once  diphenhydrAMINE (BENADRYL) injection 25 mg, 25 mg, IntraVENous, Once  Allergies:  Cefepime and Toradol [ketorolac tromethamine]    Social History:    TOBACCO:   reports that she quit smoking about 10 months ago. Her smoking use included cigarettes. She has a 3.00 pack-year smoking history. She has never used smokeless tobacco.  ETOH:   reports no history of alcohol use. DRUGS:   reports current drug use. Drug: Opiates .     Family History:       Problem Relation Age of Onset   Paulette Mackey Asthma Mother     Hypertension Mother     High Blood Pressure Mother     Diabetes Mother     Asthma Brother     High Blood Pressure Father      REVIEW OF SYSTEMS:    CONSTITUTIONAL:  positive for  fatigue and malaise  EYES:  negative  HEENT:  negative for  hearing loss and epistaxis  RESPIRATORY:  positive for dyspnea  CARDIOVASCULAR:  negative for  chest pain, dyspnea  GASTROINTESTINAL:  positive for nausea and vomiting and abdominal pain  GENITOURINARY:  negative  INTEGUMENT/BREAST:  negative  HEMATOLOGIC/LYMPHATIC:  negative  ALLERGIC/IMMUNOLOGIC:  positive for drug reactions  ENDOCRINE:  positive for weight changes    MUSCULOSKELETAL:  negative  NEUROLOGICAL:  negative  PHYSICAL EXAM:      Vitals:    VITALS:  /78 Pulse 111   Temp 97.4 °F (36.3 °C) (Oral)   Resp 16   Ht 5' 4\" (1.626 m)   Wt 119 lb (54 kg)   SpO2 99%   BMI 20.43 kg/m²   24HR INTAKE/OUTPUT:  No intake or output data in the 24 hours ending 12/07/21 1339    Access: RIJ tunneled dialysis catheter  Constitutional:  Lethargic, but wakens to voice  HEENT:  PERRL, normocephalic, atraumatic  Respiratory:  CTA bilateral   Cardiovascular/Edema:  ST, RRR, no murmur gallop or rub  Gastrointestinal:  abd distended, c/o persistent abdominal pain  Neurologic:  Lethargic, awakens to voice, follows commands, no focal deficit  Skin:  Warm, dry, ecchymotic areas to abdomen  Other:      DATA:    CBC with Differential:    Lab Results   Component Value Date    WBC 6.4 12/07/2021    RBC 3.63 12/07/2021    HGB 9.9 12/07/2021    HCT 31.2 12/07/2021     12/07/2021    MCV 86.0 12/07/2021    MCH 27.3 12/07/2021    MCHC 31.7 12/07/2021    RDW 16.6 12/07/2021    NRBC 0.9 08/10/2020    SEGSPCT 67 06/27/2013    METASPCT 1.7 08/10/2020    LYMPHOPCT 16.4 12/07/2021    MONOPCT 7.2 12/07/2021    BASOPCT 1.1 12/07/2021    MONOSABS 0.46 12/07/2021    LYMPHSABS 1.05 12/07/2021    EOSABS 0.14 12/07/2021    BASOSABS 0.07 12/07/2021     CMP:    Lab Results   Component Value Date     12/07/2021    K 2.8 12/07/2021    K 4.2 11/24/2021    CL 92 12/07/2021    CO2 26 12/07/2021    BUN 10 12/07/2021    CREATININE 4.4 12/07/2021    GFRAA 14 12/07/2021    LABGLOM 14 12/07/2021    GLUCOSE 332 12/07/2021    GLUCOSE 130 05/18/2012    PROT 6.1 12/07/2021    LABALBU 3.1 12/07/2021    LABALBU 4.1 05/18/2012    CALCIUM 9.0 12/07/2021    BILITOT <0.2 12/07/2021    ALKPHOS 129 12/07/2021    AST 5 12/07/2021    ALT <5 12/07/2021     Magnesium:    Lab Results   Component Value Date    MG 1.8 12/07/2021     Phosphorus:    Lab Results   Component Value Date    PHOS 2.5 11/25/2021     Radiology Review:    CT Abdomen and Pelvis - December 7, 2021   Severe ascites is the predominant finding in the abdomen and pelvis. Chest X-Ray December 7, 2021   Improving atelectasis versus infiltrate in lower lungs. BRIEF SUMMARY OF INITIAL CONSULT: Briefly, Miss Sharita Hardy is a 36year-old female with a PMH of ESRD on home hemodialysis 5 days/wk, (initiated September 2021, Type I DM, poorly controlled with recurrent admissions for DKA, HTN, gastroparesis, ascites, infective endocarditis, cardiac arrest, hypothyroidism and depression. She was admitted to [facility] on December 7, 2021, after presenting to the ED with complaints of chest pain, SOB, nausea and vomiting x 1 week, weakness and decreased appetite. She denies missing any hemodialysis treatments. We are consulted for dialysis management. IMPRESSION/RECOMMENDATIONS:      1. ESRD on home hemodialysis 5 days/wk via RIJ tunneled dialysis catheter. HD initiated September 2021 after failed peritoneal dialysis with persistent hyperkalemia. For dialysis three times/wk MWF while inpatient. For dialysis tomorrow. 2. Hypokalemia, multifactorial - 2/2 GI losses from vomiting, diarrhea and poor oral intake in the setting of loop diuretic administration (takes Bumex at home). Will replace pending results of repeat BMP. 3. HTN, on lopressor and amlodipine at home  4. Vitamin D deficiency, on ergocalciferol at home  5. MBD of CKD, on sevelamer (hold until phosphorus level obtained)  6. Anemia of CKD,  Hgb 9.9 mg/dL today, start epoetin alpha 3,000 unit 3 times/wk  -------------------------------------------------------------------------------    7. Type I DM, poorly controlled with frequent readmissions for DKA  8. Restless leg syndrome, on ropinirole at home  9. Ascites, unknown etiology, s/p paracentesis September 2021. LFT within normal limits  10. Depression, on Abilify  11. Hypothyroidism, on levothyroxine   12.  History of gastroparesis, on metoclopramide        PLAN:    · Continue HD M-W-F while inpatient, for HD tomorrow  · Repeat BMP, will replace potassium if hypokalemia persists  · Monitor labs daily  · Continue epoetin alpha 3,000 units 3 times/wk  · Obtain magnesium level  · Obtain phosphorus levels  · Obtain cbc in am  · Consider GI consult if abdominal pain persists    Thank you for allowing us to participate in the care of this patient.     Electronically signed by HEBER Horne CNP on 12/7/21 at 2:39 PM EST

## 2021-12-07 NOTE — H&P
Hospitalist History & Physical      PCP: Eric Jett MD    Date of Admission: 2021    Date of Service: Pt seen/examined on 2021 and is admitted to Inpatient with expected LOS greater than two midnights due to medical therapy. Placed inpatient     Chief Complaint:  had concerns including Chest Pain (mid chest states right now in triage with shortness of breath. ) and Nausea (and vomiting x 1 wk. abd pain ). History Of Present Illness:    Ms. Claudette Sutherland, a 36y.o. year old female  who  has a past medical history of Acute congestive heart failure ([address] BC (acute kidney injury) ([address] Cardiac arrest ([address] Cephalgia, Chronic kidney disease, Depression, Diabetes mellitus ([address] Diabetic gastroparesis associated with type 1 diabetes mellitus ([address] Diabetic ketoacidosis ([address] Diabetic ketoacidosis with coma associated with type 1 diabetes mellitus ([address] Diabetic polyneuropathy associated with type 1 diabetes mellitus ([address] Drug use complicating pregnancy in third trimester, Endocarditis, ESRD (end stage renal disease) ([address] H/O cardiovascular stress test, Hemodialysis patient ([address] History of blood transfusion, Hyperlipidemia, Hyperosmolar hyperglycemic state (HHS) ([address] Hypothyroidism, Iron deficiency anemia, MDRO (multiple drug resistant organisms) resistance, MRSA (methicillin resistant Staphylococcus aureus), Non compliance w medication regimen, Other disorders of kidney and ureter, Pregnancy, Previous  delivery affecting pregnancy, antepartum, Previous stillbirth or demise, antepartum, Seizure ([address] Severe pre-eclampsia in third trimester, Shock liver, and Valvular endocarditis. Patient came to our service with abdominal pain and distention started for about 1 week associated with nausea vomiting. CT of the abdomen show ascites severe patient denies history of ascites previously. She stopped using peritoneal dialysis about 1 month ago.   Patient saying that for 1 week her abdomen become bigger and distended pain develop for the last 24-48 hrs.       Past Medical History:        Diagnosis Date    Acute congestive heart failure ([address]     BC (acute kidney injury) ([address] 10/01/2019    Cardiac arrest ([address] 02/15/2021    Cephalgia 10/09/2019    Chronic kidney disease     Depression     Diabetes mellitus ([address]     Diabetic gastroparesis associated with type 1 diabetes mellitus ([address] 2018    Diabetic ketoacidosis ([address] 2011    Diabetic ketoacidosis with coma associated with type 1 diabetes mellitus ([address] 2013    Diabetic polyneuropathy associated with type 1 diabetes mellitus ([address] 2020    Drug use complicating pregnancy in third trimester     Endocarditis 10/31/2020    ESRD (end stage renal disease) ([address] 2020    H/O cardiovascular stress test 2021    Lexiscan    Hemod[facility])     History of blood transfusion 2019    Hyperlipidemia 10/08/2020    Hyperosmolar hyperglycemic state (HHS) ([address] 2020    Hypothyroidism 10/08/2020    Iron deficiency anemia 10/01/2019    MDRO (multiple drug resistant organisms) resistance     MRSA (methicillin resistant Staphylococcus aureus)     back wound abcess    Non compliance w medication regimen 2016    Other disorders of kidney and ureter     Pregnancy 2016    16 weeks    Previous  delivery affecting pregnancy, antepartum 2017    Previous stillbirth or demise, antepartum 2016    Seizure ([address] 2020    Severe pre-eclampsia in third trimester 2016    Shock liver 02/15/2021    Valvular endocarditis 11/10/2020    This Diagnosis was added to the Problem List based on transcribed orders from Dr. Onesimo Randall          Past Surgical History:        Procedure Laterality Date    BACK SURGERY      abscess    CATHETER REMOVAL N/A 10/1/2021    PERITONEAL CATHETER REMOVAL performed by Alvarez Haider MD at [place] [address] x2    CHOLECYSTECTOMY, LAPAROSCOPIC N/A 11/7/2019    CHOLECYSTECTOMY LAPAROSCOPIC performed by Rosette Andrew MD at [address] [facility] N/A 6/25/2018    COLONOSCOPY WITH BIOPSY performed by Kiersten Myers MD at [address] [facility] COLONOSCOPY N/A 12/18/2018    COLONOSCOPY WITH BIOPSY performed by Dion Babin MD at [address]  1/31/2012    EF 57%    ECHO COMPL W DOP COLOR FLOW  6/10/2013         EMBOLECTOMY N/A 11/6/2020    [address] [place] [address], [id number] [place] [address], YULISSA -- REQS ROOM 3 performed by Orquidea Monet MD at [address] [place] [address] Left 2/23/2021    LEFT LEG INCISION AND DRAINAGE, DEBRIDEMENT, WOUND VAC APPLICATION performed by Obey Lawson DPM at [address] [place] [address] Left 5/18/2021    LEFT LEG DEBRIDEMENT BIOPSY POSS APPLICATION WOUND VAC performed by Obey Lawson DPM at [place] [address] N/A 6/26/2020    LAPAROSCOPIC INSERTION PERITONEAL DIALYSIS CATHETER performed by Timothy Dennis MD at [facility] [address] ESOPHAGOGASTRODUODENOSCOPY TRANSORAL DIAGNOSTIC N/A 5/30/2018    EGD ESOPHAGOGASTRODUODENOSCOPY performed by Kiersten Myers MD at [address] [place] [address] TRANSESOPHAGEAL ECHOCARDIOGRAM N/A 10/19/2020    TRANSESOPHAGEAL ECHOCARDIOGRAM WITH BUBBLE STUDY performed by Linda Woo MD at [address] [facility]  04/2018    UPPER GASTROINTESTINAL ENDOSCOPY  12/18/2018    EGD BIOPSY performed by Dion Babin MD at [facility] 10/11/2019    EGD ESOPHAGOGASTRODUODENOSCOPY performed by Simi Byrne DO at [facility] N/A 7/14/2021    EGD BIOPSY performed by Margot Hall MD at [place] [address]       Medications Prior to Admission:      Prior to Admission medications    Medication Sig Start Date End Date Taking?  Authorizing Provider   insulin glargine (LANTUS) 100 UNIT/ML injection vial Inject 6 Units into the skin 2 times daily 11/15/21  Yes Ranjeet White MD   metoprolol tartrate (LOPRESSOR) 50 MG tablet Take 1 tablet by mouth 2 times daily 11/14/21  Yes Evin Lopez,    amLODIPine (NORVASC) 5 MG tablet Take 1 tablet by mouth daily 11/15/21  Yes Rik Jarrell DO   bumetanide (BUMEX) 2 MG tablet Take 2 mg by mouth 2 times daily   Yes Historical Provider, MD   sevelamer (RENVELA) 800 MG tablet Take 1 tablet by mouth 3 times daily (with meals) New lower dose 7/2/21  Yes Ranjeet White MD   mirtazapine (REMERON) 15 MG tablet Take 15 mg by mouth nightly   Yes Historical Provider, MD   ARIPiprazole (ABILIFY) 5 MG tablet Take 5 mg by mouth nightly 4/18/21  Yes Historical Provider, MD   levothyroxine (SYNTHROID) 75 MCG tablet TAKE 1 TABLET BY MOUTH IN THE MORNING BEFORE BREAKFAST 4/19/21  Yes Jacinda Littlejohn DO   rOPINIRole (REQUIP) 0.25 MG tablet Take 0.25 mg by mouth nightly   Yes Historical Provider, MD   sennosides-docusate sodium (SENOKOT-S) 8.6-50 MG tablet Take 2 tablets by mouth nightly as needed for Constipation 11/25/21   Melissa Gipson PA-C   vitamin D (ERGOCALCIFEROL) 1.25 MG (52775 UT) CAPS capsule Take 1 capsule by mouth once a week 9/3/21   Ranjeet White MD   polyethylene glycol (GLYCOLAX) 17 g packet Take 17 g by mouth daily as needed for Constipation    Historical Provider, MD       Allergies:  Cefepime and Toradol [ketorolac tromethamine]    Social History:    TOBACCO:   reports that she quit smoking about 10 months ago. Her smoking use included cigarettes. She has a 3.00 pack-year smoking history. She has never used smokeless tobacco.  ETOH:   reports no history of alcohol use. Family History:    Reviewed in detail and negative for DM, CAD, Cancer, CVA.  Positive as follows\"      Problem Relation Age of Onset    Asthma Mother     Hypertension Mother     High Blood Pressure Mother     Diabetes Mother     Asthma Brother     High Blood Pressure Father        REVIEW OF SYSTEMS:   Pertinent positives as noted in the HPI. All other systems reviewed and negative. PHYSICAL EXAM:  BP (!) 179/129   Pulse 117   Temp 98.6 °F (37 °C)   Resp 20   Ht 5' 4\" (1.626 m)   Wt 119 lb (54 kg)   SpO2 99%   BMI 20.43 kg/m²   General appearance: No apparent distress, appears stated age and cooperative. HEENT: Normal cephalic, atraumatic without obvious deformity. Pupils equal, round, and reactive to light. Extra ocular muscles intact. Conjunctivae/corneas clear. Neck: Supple, with full range of motion. No jugular venous distention. Trachea midline. Respiratory: deCreased breath sound bilaterally  Cardiovascular: S1-S2 present rhythm regular no murmur gallop  Abdomen: Distended some painful on palpation  Musculoskeletal: No clubbing, cyanosis, Brisk capillary refill. Skin: Normal skin color. No rashes or lesions. Neurologic:  Neurovascularly intact without any focal sensory/motor deficits. Cranial nerves: II-XII intact, grossly non-focal.  Psychiatric: Alert and oriented, thought content appropriate, normal insight    Reviewed EKG and CXR personally    CBC:   Recent Labs     12/07/21  0124   WBC 6.4   RBC 3.63   HGB 9.9*   HCT 31.2*   MCV 86.0   RDW 16.6*        BMP:   Recent Labs     12/06/21  1000 12/07/21  0124    133   K 4.3 2.8*   CL 91* 92*   CO2 18* 26   BUN 7 10   CREATININE 2.0* 4.4*   MG  --  1.8     LFT:  Recent Labs     12/07/21  0124   PROT 6.1*   ALKPHOS 129*   ALT <5   AST 5   BILITOT <0.2   LIPASE 6*     CE:  No results for input(s): Jan Yi in the last 72 hours. PT/INR: No results for input(s): INR, APTT in the last 72 hours. BNP: No results for input(s): BNP in the last 72 hours.   ESR:   Lab Results   Component Value Date    SEDRATE 31 (H) 08/16/2021     CRP:   Lab Results   Component Value Date    CRP 0.9 (H) 08/16/2021     D Dimer:   Lab Results   Component Value Date    DDIMER 281 01/12/2020      Folate and B12:   Lab Results   Component Value Date KACATPKT04 1427 (H) 11/24/2021   ,   Lab Results   Component Value Date    FOLATE 9.2 11/24/2021     Lactic Acid:   Lab Results   Component Value Date    LACTA 0.8 12/07/2021     Thyroid Studies:   Lab Results   Component Value Date    TSH 5.000 (H) 11/24/2021    G7RWWCH 36.73 (L) 07/20/2020    H9USNNH 8.6 11/05/2015       Oupatient labs:  Lab Results   Component Value Date    CHOL 95 01/14/2020    TRIG 82 01/14/2020    HDL 33 01/14/2020    LDLCALC 46 01/14/2020    TSH 5.000 (H) 11/24/2021    INR 1.1 09/28/2021    LABA1C 10.2 (H) 11/23/2021       Urinalysis:    Lab Results   Component Value Date    NITRU Negative 11/22/2021    WBCUA 2-5 11/22/2021    WBCUA 0-1 05/18/2012    BACTERIA RARE 11/22/2021    RBCUA 0-1 11/22/2021    RBCUA 1-3 08/01/2013    BLOODU TRACE 11/22/2021    SPECGRAV 1.020 11/22/2021    GLUCOSEU >=1000 11/22/2021    GLUCOSEU >=1000 05/18/2012       Imaging:  CT ABDOMEN PELVIS WO CONTRAST Additional Contrast? None    Result Date: 12/7/2021  EXAMINATION: CT OF THE ABDOMEN AND PELVIS WITHOUT CONTRAST 12/7/2021 7:04 am TECHNIQUE: CT of the abdomen and pelvis was performed without the administration of intravenous contrast. Multiplanar reformatted images are provided for review. Dose modulation, iterative reconstruction, and/or weight based adjustment of the mA/kV was utilized to reduce the radiation dose to as low as reasonably achievable. COMPARISON: None. HISTORY: ORDERING SYSTEM PROVIDED HISTORY: generalized abdominal pain, n/v/d TECHNOLOGIST PROVIDED HISTORY: Reason for exam:->generalized abdominal pain, n/v/d Additional Contrast?->None Decision Support Exception - unselect if not a suspected or confirmed emergency medical condition->Emergency Medical Condition (MA) What reading provider will be dictating this exam?->CRC FINDINGS: Lower Chest: Mild bibasilar densities are suggestive of atelectasis.  Apparently the lower portion of a central venous catheter is seen in the IVC and right atrium, recommend clinical correlation. Old lower rib fractures anteriorly Organs: Limited noncontrast evaluation. Cholecystectomy present GI/Bowel: No obstruction is seen. Mildly distended small bowel loops image 123 on the left and borderline image 79 are possibly physiologic. Limited by lack of contrast and adjacent fluid density in the evaluation. Pelvis: Apparent ovarian follicles noted more to right. Mild bladder prominence but not well distended for evaluation. Peritoneum/Retroperitoneum: Diffuse large quantity ascites Bones/Soft Tissues: Mild subcutaneous edema suggested Limitations: By noncontrast evaluation. Severe ascites is the predominant finding in the abdomen and pelvis. CT HEAD WO CONTRAST    Result Date: 11/22/2021  EXAMINATION: CT OF THE HEAD WITHOUT CONTRAST  11/22/2021 8:14 pm TECHNIQUE: CT of the head was performed without the administration of intravenous contrast. Dose modulation, iterative reconstruction, and/or weight based adjustment of the mA/kV was utilized to reduce the radiation dose to as low as reasonably achievable. COMPARISON: None. HISTORY: ORDERING SYSTEM PROVIDED HISTORY: ams TECHNOLOGIST PROVIDED HISTORY: Reason for exam:->ams Has a \"code stroke\" or \"stroke alert\" been called? ->No Decision Support Exception - unselect if not a suspected or confirmed emergency medical condition->Emergency Medical Condition (MA) FINDINGS: BRAIN/VENTRICLES: There is no acute intracranial hemorrhage, mass effect or midline shift. No abnormal extra-axial fluid collection. The gray-white differentiation is maintained without evidence of an acute infarct. There is no evidence of hydrocephalus. ORBITS: The visualized portion of the orbits demonstrate no acute abnormality. SINUSES: The visualized paranasal sinuses and mastoid air cells demonstrate no acute abnormality. SOFT TISSUES/SKULL:  No acute abnormality of the visualized skull or soft tissues. No acute intracranial abnormality.      CT ABDOMEN PELVIS W IV CONTRAST Additional Contrast? None    Result Date: 11/22/2021  EXAMINATION: CT OF THE ABDOMEN AND PELVIS WITH CONTRAST 11/22/2021 10:07 pm TECHNIQUE: CT of the abdomen and pelvis was performed with the administration of intravenous contrast. Multiplanar reformatted images are provided for review. Dose modulation, iterative reconstruction, and/or weight based adjustment of the mA/kV was utilized to reduce the radiation dose to as low as reasonably achievable. COMPARISON: None. HISTORY: ORDERING SYSTEM PROVIDED HISTORY: abdominal pain TECHNOLOGIST PROVIDED HISTORY: Reason for exam:->abdominal pain Additional Contrast?->None Decision Support Exception - unselect if not a suspected or confirmed emergency medical condition->Emergency Medical Condition (MA) FINDINGS: Lower Chest:   Small bilateral pleural effusions with adjacent atelectasis. Cardiomegaly. Organs: The liver, spleen, adrenal glands, kidneys, pancreas unremarkable. Cholecystectomy. GI/Bowel: Diffuse colonic fecal retention. Normal small bowel. The appendix is not definitively visualized. Pelvis: Reyes catheter within a decompressed urinary bladder. Peritoneum/Retroperitoneum: Large amount of ascites. Bones/Soft Tissues: Normal osseous structures. Large amount of ascites. Diffuse colonic fecal retention. Bilateral pleural effusions with adjacent atelectasis. Cholecystectomy. XR CHEST PORTABLE    Result Date: 12/7/2021  EXAMINATION: ONE XRAY VIEW OF THE CHEST 12/7/2021 1:40 am COMPARISON: 11/24/2021 HISTORY: ORDERING SYSTEM PROVIDED HISTORY: pain TECHNOLOGIST PROVIDED HISTORY: Reason for exam:->pain What reading provider will be dictating this exam?->CRC FINDINGS: Central venous catheter is unchanged. There is no cardiomegaly or vascular congestion. There is some persistent but improved atelectasis or infiltrate in the lower lungs. There is no pleural effusion. There is no pneumothorax.      Improving atelectasis versus infiltrate in lower lungs. XR CHEST PORTABLE    Result Date: 11/24/2021  EXAMINATION: ONE XRAY VIEW OF THE CHEST 11/24/2021 8:05 pm COMPARISON: Chest series from November 11, 2021 HISTORY: ORDERING SYSTEM PROVIDED HISTORY: hypoxia TECHNOLOGIST PROVIDED HISTORY: Reason for exam:->hypoxia FINDINGS: Tunneled right chest wall catheter with distal tips projecting over the right atrium. There are ill-defined opacities in the bilateral lower lungs. No pleural effusion or pneumothorax. Cardiomediastinal silhouette is unchanged. Normal pulmonary vascularity. Osseous and thoracic soft tissue structures demonstrate no acute findings. 1.  Ill-defined opacities in the bilateral lower lungs similar to prior exam. 2.  Right chest wall catheter. XR CHEST PORTABLE    Result Date: 11/11/2021  EXAMINATION: ONE XRAY VIEW OF THE CHEST 11/11/2021 9:24 am COMPARISON: September 28, 2021 HISTORY: ORDERING SYSTEM PROVIDED HISTORY: shorntess of breath TECHNOLOGIST PROVIDED HISTORY: Reason for exam:->shorntess of breath FINDINGS: Right-sided central venous catheter is present with distal tip of location of right atrial/caval junction or right atrium. Interstitial prominence bilaterally notable in the lung bases and perihilar locations. No evidence of pleural effusion. No pneumothorax. The heart is normal in size. Stable interstitial prominence bilaterally notable in the lung bases suggesting subsegmental atelectasis or scarring or persistent pneumonia. Continued follow-up could be helpful for further evaluation. ASSESSMENT:  PLAN:  Active Problems:    Ascites  Resolved Problems:    * No resolved hospital problems.  *    Abdominal pain  CT of the abdomen shows severe ascites  Will need paracentesis to rule out SBP  We will consult GI  Empirically started on IV Levaquin and Clinda  Patient has multiple allergies to IV antibiotics    New onset of ascites  Patient previously had shock liver in the third trimester With severe preeclampsia   will need paracentesis  GI consulted and follow-up    End-stage kidney disease  On hemodialysis per nephrology  Nephrology consulted and follow-up  Patient used to have peritoneal dialysis daily  Stopped peritoneal dialysis 1 month ago    Hypokalemia  Secondary to GI loss  Will replace    Anemia  Secondary to anemia of chronic disease  Continue monitor H&H  Transfuse if needed    Diabetes  Continue insulin sliding scales  Check A1C    Diet: Diet NPO  Code Status: Full Code    Surrogate decision maker confirmed with patient:  Primary Emergency Contact: Gayla Barnes, Home Phone: [phone]    DVT Prophylaxis: []Lovenox []Heparin []PCD [] [address] [x]Encouraged ambulation    Disposition: []Med/Surg [x] Intermediate [] ICU/CCU  Admit status: [] Observation [] Inpatient     +++++++++++++++++++++++++++++++++++++++++++++++++  Jael Souza MD  [address]  +++++++++++++++++++++++++++++++++++++++++++++++++  NOTE: This report was transcribed using voice recognition software. Every effort was made to ensure accuracy; however, inadvertent computerized transcription errors may be present.

## 2021-12-07 NOTE — ED NOTES
Department of Emergency Medicine  FIRST PROVIDER TRIAGE NOTE             Independent MLP           12/7/21  12:48 AM EST    Date of Encounter: 12/7/21   MRN: [id number]      HPI: Qian Carballo is a 36 y.o. female who presents to the ED for Chest Pain (mid chest states right now in triage with shortness of breath. ) and Nausea (and vomiting x 1 wk )  1 week history of extreme weakness, nausea, vomiting, chest pain, no appetite and weight loss. Patient also with history of end-stage renal disease does do home dialysis. Reports that she did not miss any sessions. Patient with recent admission to the ICU due to altered mental status    ROS: Negative for rash or headache. PE: Gen Appearance/Constitutional: alert , Chest pain- Cardiac      Initial Plan of Care: All treatment areas with department are currently occupied. Plan to order/Initiate the following while awaiting opening in ED: labs, EKG and imaging studies.     Initial Plan of Care: Initiate Treatment-Testing, Proceed toTreatment Area When Bed Available for ED Attending/MLP to Continue Care    Electronically signed by HEBER Baugh CNP   DD: 12/7/21         HEBER Baugh CNP  12/07/21 0049    ATTENDING PROVIDER ATTESTATION:     Supervising Physician, on-site, available for consultation, non-participatory in the evaluation or care of this patient         Sushil Henderson MD  12/07/21 [id number]

## 2021-12-07 NOTE — ED PROVIDER NOTES
82-year-old female presented emergency room for chest discomfort, nausea, vomiting, diarrhea, weight loss for the last week, poor p.o. intake as well. Nausea, vomiting, diarrhea began last week, chest pain began when she was in triage. She has shortness of breath as well, has generalized abdominal pain. Symptoms 9 out of 10 in severity, persistent, nothing make them better, worsening with time, constant, moderate to severe in severity. History of ESRD on dialysis, does not 5 days a week, follows with , last session was yesterday. Review of Systems   Constitutional: Positive for fatigue. Negative for chills and fever. HENT: Negative for ear pain, sinus pressure and sore throat. Eyes: Negative for pain and discharge. Respiratory: Positive for shortness of breath. Negative for cough and wheezing. Cardiovascular: Positive for chest pain. Gastrointestinal: Positive for abdominal pain, diarrhea, nausea and vomiting. Genitourinary: Negative for dysuria and frequency. Musculoskeletal: Negative for arthralgias and back pain. Skin: Negative for rash and wound. Neurological: Negative for weakness and headaches. Hematological: Negative for adenopathy. All other systems reviewed and are negative. Physical Exam  Vitals and nursing note reviewed. Constitutional:       Appearance: Normal appearance. She is ill-appearing. HENT:      Head: Normocephalic and atraumatic. Right Ear: External ear normal.      Left Ear: External ear normal.      Nose: Nose normal.      Mouth/Throat:      Mouth: Mucous membranes are dry. Eyes:      Extraocular Movements: Extraocular movements intact. Pupils: Pupils are equal, round, and reactive to light. Cardiovascular:      Rate and Rhythm: Regular rhythm. Tachycardia present. Pulses: Normal pulses. Heart sounds: Normal heart sounds.    Pulmonary:      Effort: Pulmonary effort is normal.      Breath sounds: Normal breath sounds. Comments: Right-sided chest Tessio present  Abdominal:      General: Abdomen is flat. Bowel sounds are normal.      Palpations: Abdomen is soft. Musculoskeletal:         General: Normal range of motion. Cervical back: Normal range of motion and neck supple. Skin:     General: Skin is warm and dry. Neurological:      General: No focal deficit present. Mental Status: She is alert and oriented to person, place, and time. Cranial Nerves: No cranial nerve deficit. Sensory: No sensory deficit. Motor: No weakness. Procedures     MDM     ED Course as of 12/17/21 0728   Tue Dec 07, 2021   0126 EKG: This EKG is signed by emergency department physician. Rate: 104  Rhythm: Sinus  Interpretation: Sinus tachycardia, LVH with repull  Comparison: changes compared to previous EKG      [JG]   0227 Will check a delta troponin, patient appears to be hypokalemic, will hold off repleting as patient is ESRD on dialysis [JG]   0508 Called lab, they said the [facility] should result within 1/2-hour [JG]   0621 Received patient as sign out from Dr. Alfred Odonnell. Patient presenting with N/V/fatigue, awaiting room placement. [RH]   1300 Patient was just roomed, I just evaluated patient for the first time. [RH]   1301 Patient states she has had about a week of ongoing crampy abdominal pain, emesis. She states that she does have gastroparesis, that she was taken off of Reglan due to chronic diarrhea. She previously underwent peritoneal dialysis, but now does hemodialysis at home 5 days a week, did not have it yesterday. Patient with benign abdominal exam, diffuse tenderness to deep palpation with no guarding rebound tenderness. CT scan with diffuse ascites but no other acute abdominal process. Patient with hypokalemia, 2.8, this is from 12 hours ago, we will plan to repeat labs if possible.  Patient denying any chest pain at this time, was apparently previously endorsing chest pain, elevated troponin, pending repeat. [RH]   56 Dr. Leticia Caruso wants patient admitted. He will place orders. [RH]   2030 Discussed patient with Dr. Benitez Obando, she agreed to admit patient. She was concerned about patient's ascites, possible SBP. When I examined patient she was not significantly tender, she has not had peritoneal dialysis in some time, has well-healed surgical scars. [RH]      ED Course User Index  [JG] Heide Manrique MD  [RH] [address] Mckeon, DO        --------------------------------------------- PAST HISTORY ---------------------------------------------  Past Medical History:  has a past medical history of Acute congestive heart failure ([address] BC (acute kidney injury) ([address] Cardiac arrest ([address] Cephalgia, Chronic kidney disease, Depression, Diabetes mellitus ([address] Diabetic gastroparesis associated with type 1 diabetes mellitus ([address] Diabetic ketoacidosis ([address] Diabetic ketoacidosis with coma associated with type 1 diabetes mellitus ([address] Diabetic polyneuropathy associated with type 1 diabetes mellitus ([address] Drug use complicating pregnancy in third trimester, Endocarditis, ESRD (end stage renal disease) ([address] H/O cardiovascular stress test, Hemodialysis patient ([address] History of blood transfusion, Hyperlipidemia, Hyperosmolar hyperglycemic state (HHS) ([address] Hypothyroidism, Iron deficiency anemia, MDRO (multiple drug resistant organisms) resistance, MRSA (methicillin resistant Staphylococcus aureus), Non compliance w medication regimen, Other disorders of kidney and ureter, Pregnancy, Previous  delivery affecting pregnancy, antepartum, Previous stillbirth or demise, antepartum, Seizure ([address] Severe pre-eclampsia in third trimester, Shock liver, and Valvular endocarditis. Past Surgical History:  has a past surgical history that includes ECHO Compl W Dop Color Flow (2012); ECHO Compl W Dop Color Flow (6/10/2013);  section;  Tunneled venous port placement (2018); pr esophagogastroduodenoscopy transoral diagnostic (N/A, 5/30/2018); back surgery; Colonoscopy (N/A, 6/25/2018); Colonoscopy (N/A, 12/18/2018); Upper gastrointestinal endoscopy (12/18/2018); Upper gastrointestinal endoscopy (N/A, 10/11/2019); Cholecystectomy, laparoscopic (N/A, 11/7/2019); LAPAROSCOPY INSERTION PERITONEAL CATHETER (N/A, 6/26/2020); transesophageal echocardiogram (N/A, 10/19/2020); embolectomy (N/A, 11/6/2020); Foot Debridement (Left, 2/23/2021); Foot Debridement (Left, 5/18/2021); Upper gastrointestinal endoscopy (N/A, 7/14/2021); and Catheter Removal (N/A, 10/1/2021). Social History:  reports that she quit smoking about 10 months ago. Her smoking use included cigarettes. She has a 3.00 pack-year smoking history. She has never used smokeless tobacco. She reports current drug use. Drug: Opiates . She reports that she does not drink alcohol. Family History: family history includes Asthma in her brother and mother; Diabetes in her mother; High Blood Pressure in her father and mother; Hypertension in her mother. The patients home medications have been reviewed.     Allergies: Cefepime and Toradol [ketorolac tromethamine]    -------------------------------------------------- RESULTS -------------------------------------------------    LABS:  Results for orders placed or performed during the hospital encounter of 12/07/21   COVID-19, Rapid    Specimen: Nasopharyngeal Swab   Result Value Ref Range    SARS-CoV-2, NAAT Not Detected Not Detected   Culture, Body Fluid    Specimen: Ascitic Fluid   Result Value Ref Range    Body Fluid Culture, Sterile       Neisseria gonorrhoeae not isolated  Growth not present      Gram Stain Result Refer to ordered Gram stain for results    Gram Stain    Specimen: Ascitic Fluid   Result Value Ref Range    Gram Stain Orderable       Gram stain performed on unspun fluid  Rare Polymorphonuclear leukocytes  Epithelial cells not seen  No organisms seen     Troponin   Result Value Ref Range    Troponin, High Sensitivity 189 (H) 0 - 9 ng/L   CBC Auto Differential   Result Value Ref Range    WBC 6.4 4.5 - 11.5 E9/L    RBC 3.63 3.50 - 5.50 E12/L    Hemoglobin 9.9 (L) 11.5 - 15.5 g/dL    Hematocrit 31.2 (L) 34.0 - 48.0 %    MCV 86.0 80.0 - 99.9 fL    MCH 27.3 26.0 - 35.0 pg    MCHC 31.7 (L) 32.0 - 34.5 %    RDW 16.6 (H) 11.5 - 15.0 fL    Platelets 902 904 - 289 E9/L    MPV 11.1 7.0 - 12.0 fL    Neutrophils % 72.8 43.0 - 80.0 %    Immature Granulocytes % 0.3 0.0 - 5.0 %    Lymphocytes % 16.4 (L) 20.0 - 42.0 %    Monocytes % 7.2 2.0 - 12.0 %    Eosinophils % 2.2 0.0 - 6.0 %    Basophils % 1.1 0.0 - 2.0 %    Neutrophils Absolute 4.66 1.80 - 7.30 E9/L    Immature Granulocytes # 0.02 E9/L    Lymphocytes Absolute 1.05 (L) 1.50 - 4.00 E9/L    Monocytes Absolute 0.46 0.10 - 0.95 E9/L    Eosinophils Absolute 0.14 0.05 - 0.50 E9/L    Basophils Absolute 0.07 0.00 - 0.20 E9/L   Comprehensive Metabolic Panel   Result Value Ref Range    Sodium 133 132 - 146 mmol/L    Potassium 2.8 (L) 3.5 - 5.0 mmol/L    Chloride 92 (L) 98 - 107 mmol/L    CO2 26 22 - 29 mmol/L    Anion Gap 15 7 - 16 mmol/L    Glucose 332 (H) 74 - 99 mg/dL    BUN 10 6 - 20 mg/dL    CREATININE 4.4 (H) 0.5 - 1.0 mg/dL    GFR Non-African American 14 >=60 mL/min/1.73    GFR African American 14     Calcium 9.0 8.6 - 10.2 mg/dL    Total Protein 6.1 (L) 6.4 - 8.3 g/dL    Albumin 3.1 (L) 3.5 - 5.2 g/dL    Total Bilirubin <0.2 0.0 - 1.2 mg/dL    Alkaline Phosphatase 129 (H) 35 - 104 U/L    ALT <5 0 - 32 U/L    AST 5 0 - 31 U/L   Magnesium   Result Value Ref Range    Magnesium 1.8 1.6 - 2.6 mg/dL   Lactic Acid, Plasma   Result Value Ref Range    Lactic Acid 0.8 0.5 - 2.2 mmol/L   Lipase   Result Value Ref Range    Lipase 6 (L) 13 - 60 U/L   hCG, quantitative, pregnancy   Result Value Ref Range    hCG Quant 1.1 <10 mIU/mL   SPECIMEN REJECTION   Result Value Ref Range    Rejected Test all     Reason for Rejection see below    Magnesium   Result Value Ref Range    Magnesium 1.8 1.6 - 2.6 mg/dL   Phosphorus   Result Value Ref Range    Phosphorus 3.4 2.5 - 4.5 mg/dL   Basic metabolic panel   Result Value Ref Range    Sodium 134 132 - 146 mmol/L    Potassium 3.3 (L) 3.5 - 5.0 mmol/L    Chloride 95 (L) 98 - 107 mmol/L    CO2 24 22 - 29 mmol/L    Anion Gap 15 7 - 16 mmol/L    Glucose 100 (H) 74 - 99 mg/dL    BUN 14 6 - 20 mg/dL    CREATININE 5.1 (H) 0.5 - 1.0 mg/dL    GFR Non-African American 12 >=60 mL/min/1.73    GFR African American 12     Calcium 8.8 8.6 - 10.2 mg/dL   CBC   Result Value Ref Range    WBC 4.0 (L) 4.5 - 11.5 E9/L    RBC 3.88 3.50 - 5.50 E12/L    Hemoglobin 10.7 (L) 11.5 - 15.5 g/dL    Hematocrit 33.9 (L) 34.0 - 48.0 %    MCV 87.4 80.0 - 99.9 fL    MCH 27.6 26.0 - 35.0 pg    MCHC 31.6 (L) 32.0 - 34.5 %    RDW 16.7 (H) 11.5 - 15.0 fL    Platelets 450 (L) 054 - 450 E9/L    MPV 11.9 7.0 - 12.0 fL   Hemoglobin A1c   Result Value Ref Range    Hemoglobin A1C 8.7 (H) 4.0 - 5.6 %   Basic Metabolic Panel   Result Value Ref Range    Sodium 131 (L) 132 - 146 mmol/L    Potassium 3.2 (L) 3.5 - 5.0 mmol/L    Chloride 92 (L) 98 - 107 mmol/L    CO2 20 (L) 22 - 29 mmol/L    Anion Gap 19 (H) 7 - 16 mmol/L    Glucose 438 (H) 74 - 99 mg/dL    BUN 14 6 - 20 mg/dL    CREATININE 5.0 (H) 0.5 - 1.0 mg/dL    GFR Non-African American 12 >=60 mL/min/1.73    GFR African American 12     Calcium 8.5 (L) 8.6 - 10.2 mg/dL   Magnesium   Result Value Ref Range    Magnesium 1.8 1.6 - 2.6 mg/dL   Phosphorus   Result Value Ref Range    Phosphorus 3.3 2.5 - 4.5 mg/dL   Troponin   Result Value Ref Range    Troponin, High Sensitivity 159 (H) 0 - 9 ng/L   Protime-INR   Result Value Ref Range    Protime 12.0 9.3 - 12.4 sec    INR 1.1    Body Fluid Cell Count with Differential   Result Value Ref Range    Cell Count Fluid Type Ascites     Color, Fluid Yellow     Appearance, Fluid Clear     Nucl Cell, Fluid 43 /uL    RBC, Fluid <2,000 /uL    Neutrophil Count, Fluid 14 %    Monocyte Count, Fluid 86 %   Albumin, Fluid   Result Value Ref Range    Albumin, Fluid 2.0 Not Established g/dL    Fluid Type Ascites    Amylase, Body Fluid   Result Value Ref Range    Amylase, Fluid 23 Not Established U/L   Glucose, Body Fluid   Result Value Ref Range    Glucose, Fluid 106 Not Established mg/dL   Lactate Dehydrogenase, Body Fluid   Result Value Ref Range    LD, Fluid 143 Not Established U/L   Protein, Body Fluid   Result Value Ref Range    Protein, Fluid 3.6 Not Established g/dL   Magnesium   Result Value Ref Range    Magnesium 1.7 1.6 - 2.6 mg/dL   Phosphorus   Result Value Ref Range    Phosphorus 3.7 2.5 - 4.5 mg/dL   Basic metabolic panel   Result Value Ref Range    Sodium 133 132 - 146 mmol/L    Potassium 3.9 3.5 - 5.0 mmol/L    Chloride 98 98 - 107 mmol/L    CO2 21 (L) 22 - 29 mmol/L    Anion Gap 14 7 - 16 mmol/L    Glucose 244 (H) 74 - 99 mg/dL    BUN 16 6 - 20 mg/dL    CREATININE 6.2 (H) 0.5 - 1.0 mg/dL    GFR Non-African American 10 >=60 mL/min/1.73    GFR African American 10     Calcium 7.9 (L) 8.6 - 10.2 mg/dL   CBC   Result Value Ref Range    WBC 8.2 4.5 - 11.5 E9/L    RBC 3.80 3.50 - 5.50 E12/L    Hemoglobin 10.7 (L) 11.5 - 15.5 g/dL    Hematocrit 34.4 34.0 - 48.0 %    MCV 90.5 80.0 - 99.9 fL    MCH 28.2 26.0 - 35.0 pg    MCHC 31.1 (L) 32.0 - 34.5 %    RDW 16.7 (H) 11.5 - 15.0 fL    Platelets 747 210 - 452 E9/L    MPV 11.1 7.0 - 12.0 fL   Magnesium   Result Value Ref Range    Magnesium 3.9 (H) 1.6 - 2.6 mg/dL   Phosphorus   Result Value Ref Range    Phosphorus 1.9 (L) 2.5 - 4.5 mg/dL   Basic metabolic panel   Result Value Ref Range    Sodium 127 (L) 132 - 146 mmol/L    Potassium 3.0 (L) 3.5 - 5.0 mmol/L    Chloride 90 (L) 98 - 107 mmol/L    CO2 20 (L) 22 - 29 mmol/L    Anion Gap 17 (H) 7 - 16 mmol/L    Glucose 846 (HH) 74 - 99 mg/dL    BUN 6 6 - 20 mg/dL    CREATININE 2.9 (H) 0.5 - 1.0 mg/dL    GFR Non-African American 23 >=60 mL/min/1.73    GFR African American 23     Calcium 7.7 (L) 8.6 - 10.2 mg/dL   CBC Result Value Ref Range    WBC 4.3 (L) 4.5 - 11.5 E9/L    RBC 2.86 (L) 3.50 - 5.50 E12/L    Hemoglobin 9.2 (L) 11.5 - 15.5 g/dL    Hematocrit 26.6 (L) 34.0 - 48.0 %    MCV 93.0 80.0 - 99.9 fL    MCH 32.2 26.0 - 35.0 pg    MCHC 34.6 (H) 32.0 - 34.5 %    RDW 17.4 (H) 11.5 - 15.0 fL    Platelets 445 738 - 313 E9/L    MPV 12.6 (H) 7.0 - 12.0 fL   Calcium, ionized   Result Value Ref Range    Calcium, Ion 1.22 1.15 - 1.33 mmol/L   Basic metabolic panel   Result Value Ref Range    Sodium 131 (L) 132 - 146 mmol/L    Potassium 4.7 3.5 - 5.0 mmol/L    Chloride 102 98 - 107 mmol/L    CO2 20 (L) 22 - 29 mmol/L    Anion Gap 9 7 - 16 mmol/L    Glucose 51 (L) 74 - 99 mg/dL    BUN 6 6 - 20 mg/dL    CREATININE 3.3 (H) 0.5 - 1.0 mg/dL    GFR Non-African American 20 >=60 mL/min/1.73    GFR African American 20     Calcium 7.8 (L) 8.6 - 10.2 mg/dL   Magnesium   Result Value Ref Range    Magnesium 2.0 1.6 - 2.6 mg/dL   Phosphorus   Result Value Ref Range    Phosphorus 2.8 2.5 - 4.5 mg/dL   Basic metabolic panel   Result Value Ref Range    Sodium 133 132 - 146 mmol/L    Potassium 3.9 3.5 - 5.0 mmol/L    Chloride 100 98 - 107 mmol/L    CO2 23 22 - 29 mmol/L    Anion Gap 10 7 - 16 mmol/L    Glucose 62 (L) 74 - 99 mg/dL    BUN 9 6 - 20 mg/dL    CREATININE 4.6 (H) 0.5 - 1.0 mg/dL    GFR Non-African American 14 >=60 mL/min/1.73    GFR African American 14     Calcium 7.8 (L) 8.6 - 10.2 mg/dL   CBC   Result Value Ref Range    WBC 5.3 4.5 - 11.5 E9/L    RBC 3.21 (L) 3.50 - 5.50 E12/L    Hemoglobin 8.9 (L) 11.5 - 15.5 g/dL    Hematocrit 29.4 (L) 34.0 - 48.0 %    MCV 91.6 80.0 - 99.9 fL    MCH 27.7 26.0 - 35.0 pg    MCHC 30.3 (L) 32.0 - 34.5 %    RDW 17.5 (H) 11.5 - 15.0 fL    Platelets 965 806 - 362 E9/L    MPV 10.6 7.0 - 12.0 fL   Magnesium   Result Value Ref Range    Magnesium 1.7 1.6 - 2.6 mg/dL   Phosphorus   Result Value Ref Range    Phosphorus 3.3 2.5 - 4.5 mg/dL   Basic metabolic panel   Result Value Ref Range    Sodium 141 132 - 146 mmol/L    Potassium 4.2 3.5 - 5.0 mmol/L    Chloride 106 98 - 107 mmol/L    CO2 26 22 - 29 mmol/L    Anion Gap 9 7 - 16 mmol/L    Glucose 106 (H) 74 - 99 mg/dL    BUN 12 6 - 20 mg/dL    CREATININE 3.2 (H) 0.5 - 1.0 mg/dL    GFR Non-African American 21 >=60 mL/min/1.73    GFR African American 21     Calcium 7.9 (L) 8.6 - 10.2 mg/dL   CBC   Result Value Ref Range    WBC 5.8 4.5 - 11.5 E9/L    RBC 3.06 (L) 3.50 - 5.50 E12/L    Hemoglobin 8.3 (L) 11.5 - 15.5 g/dL    Hematocrit 27.4 (L) 34.0 - 48.0 %    MCV 89.5 80.0 - 99.9 fL    MCH 27.1 26.0 - 35.0 pg    MCHC 30.3 (L) 32.0 - 34.5 %    RDW 17.7 (H) 11.5 - 15.0 fL    Platelets 987 955 - 020 E9/L    MPV 11.4 7.0 - 12.0 fL   POCT Glucose   Result Value Ref Range    Meter Glucose 400 (H) 74 - 99 mg/dL   POCT Glucose   Result Value Ref Range    Meter Glucose 399 (H) 74 - 99 mg/dL   POCT Glucose   Result Value Ref Range    Meter Glucose 54 (L) 74 - 99 mg/dL   POCT Glucose   Result Value Ref Range    Meter Glucose 61 (L) 74 - 99 mg/dL   POCT Glucose   Result Value Ref Range    Meter Glucose 64 (L) 74 - 99 mg/dL   POCT Glucose   Result Value Ref Range    Meter Glucose 67 (L) 74 - 99 mg/dL   POCT Glucose   Result Value Ref Range    Meter Glucose 154 (H) 74 - 99 mg/dL   POCT Glucose   Result Value Ref Range    Meter Glucose 201 (H) 74 - 99 mg/dL   POCT Glucose   Result Value Ref Range    Meter Glucose 226 (H) 74 - 99 mg/dL   POCT Glucose   Result Value Ref Range    Meter Glucose 112 (H) 74 - 99 mg/dL   POCT Glucose   Result Value Ref Range    Meter Glucose 118 (H) 74 - 99 mg/dL   POCT Glucose   Result Value Ref Range    Meter Glucose 176 (H) 74 - 99 mg/dL   POCT Glucose   Result Value Ref Range    Meter Glucose 231 (H) 74 - 99 mg/dL   POCT Glucose   Result Value Ref Range    Meter Glucose 296 (H) 74 - 99 mg/dL   POCT Glucose   Result Value Ref Range    Meter Glucose 78 74 - 99 mg/dL   POCT Glucose   Result Value Ref Range    Meter Glucose 48 (L) 74 - 99 mg/dL   POCT Glucose Result Value Ref Range    Meter Glucose 49 (L) 74 - 99 mg/dL   POCT Glucose   Result Value Ref Range    Meter Glucose 94 74 - 99 mg/dL   POCT Glucose   Result Value Ref Range    Meter Glucose 119 (H) 74 - 99 mg/dL   POCT Glucose   Result Value Ref Range    Meter Glucose 105 (H) 74 - 99 mg/dL   POCT Glucose   Result Value Ref Range    Meter Glucose 73 (L) 74 - 99 mg/dL   POCT Glucose   Result Value Ref Range    Meter Glucose 61 (L) 74 - 99 mg/dL   POCT Glucose   Result Value Ref Range    Meter Glucose 96 74 - 99 mg/dL   POCT Glucose   Result Value Ref Range    Meter Glucose 126 (H) 74 - 99 mg/dL   POCT Glucose   Result Value Ref Range    Meter Glucose 123 (H) 74 - 99 mg/dL   POCT Glucose   Result Value Ref Range    Meter Glucose 178 (H) 74 - 99 mg/dL   POCT Glucose   Result Value Ref Range    Meter Glucose 94 74 - 99 mg/dL   POCT Glucose   Result Value Ref Range    Meter Glucose 43 (L) 74 - 99 mg/dL   POCT Glucose   Result Value Ref Range    Meter Glucose 56 (L) 74 - 99 mg/dL   POCT Glucose   Result Value Ref Range    Meter Glucose 176 (H) 74 - 99 mg/dL   POCT Glucose   Result Value Ref Range    Meter Glucose 118 (H) 74 - 99 mg/dL   POCT Glucose   Result Value Ref Range    Meter Glucose 70 (L) 74 - 99 mg/dL   POCT Glucose   Result Value Ref Range    Meter Glucose 86 74 - 99 mg/dL   EKG 12 Lead   Result Value Ref Range    Ventricular Rate 104 BPM    Atrial Rate 104 BPM    P-R Interval 126 ms    QRS Duration 90 ms    Q-T Interval 368 ms    QTc Calculation (Bazett) 483 ms    P Axis 33 degrees    R Axis 30 degrees    T Axis -111 degrees       RADIOLOGY:  IR US GUIDED PARACENTESIS   Final Result   Successful paracentesis. CT ABDOMEN PELVIS WO CONTRAST Additional Contrast? None   Final Result   Severe ascites is the predominant finding in the abdomen and pelvis. XR CHEST PORTABLE   Final Result   Improving atelectasis versus infiltrate in lower lungs.                    ------------------------- NURSING NOTES AND VITALS REVIEWED ---------------------------   The nursing notes within the ED encounter and vital signs as below have been reviewed. /78   Pulse 86   Temp 98 °F (36.7 °C) (Oral)   Resp 18   Ht 5' 4\" (1.626 m)   Wt 135 lb 9.3 oz (61.5 kg)   SpO2 100%   BMI 23.27 kg/m²   Oxygen Saturation Interpretation: Normal      ------------------------------------------ PROGRESS NOTES ------------------------------------------     Consultations:    Internal medicine and nephrology  Counseling:   I have spoken with the patient and discussed todays results, in addition to providing specific details for the plan of care and counseling regarding the diagnosis and prognosis. Their questions are answered at this time and they are agreeable with the plan.      --------------------------------- ADDITIONAL PROVIDER NOTES ---------------------------------          --------------------------------- IMPRESSION AND DISPOSITION ---------------------------------    IMPRESSION  1. Abdominal pain, unspecified abdominal location    2. Chest pain, unspecified type    3. Nausea and vomiting, intractability of vomiting not specified, unspecified vomiting type    4. Hypokalemia    5. ESRD on hemodialysis ([facility]        DISPOSITION  Disposition: Admit to telemetry  Patient condition is stable    This patient's ED course included: a personal history and physicial examination    This patient has been closely monitored during their ED course. Suma James MD  12/08/21 1177       Suma James MD  12/17/21 6932    ATTENDING PROVIDER ATTESTATION:     I have personally performed and/or participated in the history, exam, medical decision making, and procedures and agree with all pertinent clinical information. I have also reviewed and agree with the past medical, family and social history unless otherwise noted.     I have discussed this patient in detail with the resident, and provided the instruction and education regarding abdominal pain. My findings/Plan: Patient presenting here because of abdominal pain as well as nausea. Patient reports ongoing for days. Patient reporting multiple complaints of chest pains as well as nausea vomiting diarrhea and abdominal pain. Patient reports ongoing. Patient does have history of kidney disease. Patient reporting no fever there is no productive cough. Patient awake alert oriented mild distress abdomen distended mild tenderness. Lungs are clear exam is normal patient neurologically intact. Patient labs noted reviewed as well as CT. Nephrology and internal medicine consulted patient will be admitted.   Patient made aware of findings         Payal Clemente MD  12/17/21 4404

## 2021-12-08 ENCOUNTER — APPOINTMENT (OUTPATIENT)
Dept: INTERVENTIONAL RADIOLOGY/VASCULAR | Age: 31
DRG: 283 | End: 2021-12-08
Payer: COMMERCIAL

## 2021-12-08 LAB
ALBUMIN FLUID: 2 G/DL
AMYLASE FLUID: 23 U/L
ANION GAP SERPL CALCULATED.3IONS-SCNC: 15 MMOL/L (ref 7–16)
APPEARANCE FLUID: CLEAR
BUN BLDV-MCNC: 14 MG/DL (ref 6–20)
CALCIUM SERPL-MCNC: 8.8 MG/DL (ref 8.6–10.2)
CELL COUNT FLUID TYPE: NORMAL
CHLORIDE BLD-SCNC: 95 MMOL/L (ref 98–107)
CO2: 24 MMOL/L (ref 22–29)
COLOR FLUID: YELLOW
CREAT SERPL-MCNC: 5.1 MG/DL (ref 0.5–1)
FLUID TYPE: NORMAL
GFR AFRICAN AMERICAN: 12
GFR NON-AFRICAN AMERICAN: 12 ML/MIN/1.73
GLUCOSE BLD-MCNC: 100 MG/DL (ref 74–99)
GLUCOSE, FLUID: 106 MG/DL
HBA1C MFR BLD: 8.7 % (ref 4–5.6)
HCT VFR BLD CALC: 33.9 % (ref 34–48)
HEMOGLOBIN: 10.7 G/DL (ref 11.5–15.5)
INR BLD: 1.1
LD, FLUID: 143 U/L
MAGNESIUM: 1.8 MG/DL (ref 1.6–2.6)
MCH RBC QN AUTO: 27.6 PG (ref 26–35)
MCHC RBC AUTO-ENTMCNC: 31.6 % (ref 32–34.5)
MCV RBC AUTO: 87.4 FL (ref 80–99.9)
METER GLUCOSE: 154 MG/DL (ref 74–99)
METER GLUCOSE: 201 MG/DL (ref 74–99)
METER GLUCOSE: 54 MG/DL (ref 74–99)
METER GLUCOSE: 61 MG/DL (ref 74–99)
METER GLUCOSE: 64 MG/DL (ref 74–99)
METER GLUCOSE: 67 MG/DL (ref 74–99)
MONOCYTE, FLUID: 86 %
NEUTROPHIL, FLUID: 14 %
NUCLEATED CELLS FLUID: 43 /UL
PDW BLD-RTO: 16.7 FL (ref 11.5–15)
PHOSPHORUS: 3.4 MG/DL (ref 2.5–4.5)
PLATELET # BLD: 127 E9/L (ref 130–450)
PMV BLD AUTO: 11.9 FL (ref 7–12)
POTASSIUM SERPL-SCNC: 3.3 MMOL/L (ref 3.5–5)
PROTEIN FLUID: 3.6 G/DL
PROTHROMBIN TIME: 12 SEC (ref 9.3–12.4)
RBC # BLD: 3.88 E12/L (ref 3.5–5.5)
RBC FLUID: <2000 /UL
SODIUM BLD-SCNC: 134 MMOL/L (ref 132–146)
WBC # BLD: 4 E9/L (ref 4.5–11.5)

## 2021-12-08 PROCEDURE — 88112 CYTOPATH CELL ENHANCE TECH: CPT

## 2021-12-08 PROCEDURE — 87205 SMEAR GRAM STAIN: CPT

## 2021-12-08 PROCEDURE — 0W9G3ZZ DRAINAGE OF PERITONEAL CAVITY, PERCUTANEOUS APPROACH: ICD-10-PCS | Performed by: RADIOLOGY

## 2021-12-08 PROCEDURE — 2500000003 HC RX 250 WO HCPCS: Performed by: INTERNAL MEDICINE

## 2021-12-08 PROCEDURE — 82150 ASSAY OF AMYLASE: CPT

## 2021-12-08 PROCEDURE — 6370000000 HC RX 637 (ALT 250 FOR IP): Performed by: NURSE PRACTITIONER

## 2021-12-08 PROCEDURE — 80048 BASIC METABOLIC PNL TOTAL CA: CPT

## 2021-12-08 PROCEDURE — 6360000002 HC RX W HCPCS: Performed by: NURSE PRACTITIONER

## 2021-12-08 PROCEDURE — 84157 ASSAY OF PROTEIN OTHER: CPT

## 2021-12-08 PROCEDURE — 49083 ABD PARACENTESIS W/IMAGING: CPT

## 2021-12-08 PROCEDURE — 88305 TISSUE EXAM BY PATHOLOGIST: CPT

## 2021-12-08 PROCEDURE — 85027 COMPLETE CBC AUTOMATED: CPT

## 2021-12-08 PROCEDURE — 83615 LACTATE (LD) (LDH) ENZYME: CPT

## 2021-12-08 PROCEDURE — 82042 OTHER SOURCE ALBUMIN QUAN EA: CPT

## 2021-12-08 PROCEDURE — 49083 ABD PARACENTESIS W/IMAGING: CPT | Performed by: RADIOLOGY

## 2021-12-08 PROCEDURE — 84100 ASSAY OF PHOSPHORUS: CPT

## 2021-12-08 PROCEDURE — 2580000003 HC RX 258: Performed by: NURSE PRACTITIONER

## 2021-12-08 PROCEDURE — 6370000000 HC RX 637 (ALT 250 FOR IP): Performed by: INTERNAL MEDICINE

## 2021-12-08 PROCEDURE — C9113 INJ PANTOPRAZOLE SODIUM, VIA: HCPCS | Performed by: NURSE PRACTITIONER

## 2021-12-08 PROCEDURE — 36415 COLL VENOUS BLD VENIPUNCTURE: CPT

## 2021-12-08 PROCEDURE — 87070 CULTURE OTHR SPECIMN AEROBIC: CPT

## 2021-12-08 PROCEDURE — C1729 CATH, DRAINAGE: HCPCS

## 2021-12-08 PROCEDURE — 83735 ASSAY OF MAGNESIUM: CPT

## 2021-12-08 PROCEDURE — 89051 BODY FLUID CELL COUNT: CPT

## 2021-12-08 PROCEDURE — 83036 HEMOGLOBIN GLYCOSYLATED A1C: CPT

## 2021-12-08 PROCEDURE — 99252 IP/OBS CONSLTJ NEW/EST SF 35: CPT | Performed by: STUDENT IN AN ORGANIZED HEALTH CARE EDUCATION/TRAINING PROGRAM

## 2021-12-08 PROCEDURE — 6360000002 HC RX W HCPCS: Performed by: INTERNAL MEDICINE

## 2021-12-08 PROCEDURE — 1200000000 HC SEMI PRIVATE

## 2021-12-08 PROCEDURE — 85610 PROTHROMBIN TIME: CPT

## 2021-12-08 PROCEDURE — 82962 GLUCOSE BLOOD TEST: CPT

## 2021-12-08 PROCEDURE — 82947 ASSAY GLUCOSE BLOOD QUANT: CPT

## 2021-12-08 RX ORDER — POTASSIUM CHLORIDE 20 MEQ/1
40 TABLET, EXTENDED RELEASE ORAL ONCE
Status: COMPLETED | OUTPATIENT
Start: 2021-12-08 | End: 2021-12-08

## 2021-12-08 RX ORDER — METOCLOPRAMIDE HYDROCHLORIDE 5 MG/ML
5 INJECTION INTRAMUSCULAR; INTRAVENOUS
Status: DISPENSED | OUTPATIENT
Start: 2021-12-08 | End: 2021-12-12

## 2021-12-08 RX ORDER — HEPARIN SODIUM 10000 [USP'U]/ML
5000 INJECTION, SOLUTION INTRAVENOUS; SUBCUTANEOUS EVERY 8 HOURS
Status: DISCONTINUED | OUTPATIENT
Start: 2021-12-08 | End: 2021-12-12 | Stop reason: HOSPADM

## 2021-12-08 RX ORDER — SODIUM CHLORIDE 9 MG/ML
10 INJECTION INTRAVENOUS DAILY
Status: DISCONTINUED | OUTPATIENT
Start: 2021-12-08 | End: 2021-12-12 | Stop reason: HOSPADM

## 2021-12-08 RX ORDER — OXYCODONE HYDROCHLORIDE 5 MG/1
5 TABLET ORAL EVERY 4 HOURS PRN
Status: DISCONTINUED | OUTPATIENT
Start: 2021-12-08 | End: 2021-12-12 | Stop reason: HOSPADM

## 2021-12-08 RX ORDER — LEVOFLOXACIN 5 MG/ML
500 INJECTION, SOLUTION INTRAVENOUS
Status: DISCONTINUED | OUTPATIENT
Start: 2021-12-09 | End: 2021-12-12 | Stop reason: HOSPADM

## 2021-12-08 RX ORDER — PROMETHAZINE HYDROCHLORIDE 25 MG/ML
12.5 INJECTION, SOLUTION INTRAMUSCULAR; INTRAVENOUS EVERY 6 HOURS PRN
Status: DISCONTINUED | OUTPATIENT
Start: 2021-12-08 | End: 2021-12-12 | Stop reason: HOSPADM

## 2021-12-08 RX ORDER — DEXTROSE AND SODIUM CHLORIDE 5; .9 G/100ML; G/100ML
INJECTION, SOLUTION INTRAVENOUS CONTINUOUS
Status: DISCONTINUED | OUTPATIENT
Start: 2021-12-08 | End: 2021-12-10

## 2021-12-08 RX ORDER — PANTOPRAZOLE SODIUM 40 MG/10ML
40 INJECTION, POWDER, LYOPHILIZED, FOR SOLUTION INTRAVENOUS DAILY
Status: DISCONTINUED | OUTPATIENT
Start: 2021-12-08 | End: 2021-12-12 | Stop reason: HOSPADM

## 2021-12-08 RX ADMIN — SODIUM CHLORIDE, PRESERVATIVE FREE 10 ML: 5 INJECTION INTRAVENOUS at 14:49

## 2021-12-08 RX ADMIN — HEPARIN SODIUM 5000 UNITS: 10000 INJECTION INTRAVENOUS; SUBCUTANEOUS at 14:49

## 2021-12-08 RX ADMIN — BUMETANIDE 2 MG: 1 TABLET ORAL at 11:18

## 2021-12-08 RX ADMIN — HYDROMORPHONE HYDROCHLORIDE 1 MG: 1 INJECTION, SOLUTION INTRAMUSCULAR; INTRAVENOUS; SUBCUTANEOUS at 17:24

## 2021-12-08 RX ADMIN — ERGOCALCIFEROL 50000 UNITS: 1.25 CAPSULE, LIQUID FILLED ORAL at 11:19

## 2021-12-08 RX ADMIN — HYDROMORPHONE HYDROCHLORIDE 1 MG: 1 INJECTION, SOLUTION INTRAMUSCULAR; INTRAVENOUS; SUBCUTANEOUS at 20:23

## 2021-12-08 RX ADMIN — CLINDAMYCIN PHOSPHATE 600 MG: 600 INJECTION, SOLUTION INTRAVENOUS at 20:10

## 2021-12-08 RX ADMIN — OXYCODONE 5 MG: 5 TABLET ORAL at 11:18

## 2021-12-08 RX ADMIN — METOPROLOL TARTRATE 50 MG: 50 TABLET, FILM COATED ORAL at 11:19

## 2021-12-08 RX ADMIN — HYDROMORPHONE HYDROCHLORIDE 1 MG: 1 INJECTION, SOLUTION INTRAMUSCULAR; INTRAVENOUS; SUBCUTANEOUS at 14:26

## 2021-12-08 RX ADMIN — AMLODIPINE BESYLATE 5 MG: 5 TABLET ORAL at 11:18

## 2021-12-08 RX ADMIN — PANTOPRAZOLE SODIUM 40 MG: 40 INJECTION, POWDER, FOR SOLUTION INTRAVENOUS at 14:48

## 2021-12-08 RX ADMIN — DEXTROSE AND SODIUM CHLORIDE: 5; 900 INJECTION, SOLUTION INTRAVENOUS at 14:26

## 2021-12-08 RX ADMIN — HEPARIN SODIUM 5000 UNITS: 10000 INJECTION INTRAVENOUS; SUBCUTANEOUS at 20:03

## 2021-12-08 RX ADMIN — CLINDAMYCIN PHOSPHATE 600 MG: 600 INJECTION, SOLUTION INTRAVENOUS at 03:11

## 2021-12-08 RX ADMIN — METOCLOPRAMIDE HYDROCHLORIDE 5 MG: 5 INJECTION INTRAMUSCULAR; INTRAVENOUS at 18:32

## 2021-12-08 RX ADMIN — Medication 15 G: at 13:37

## 2021-12-08 RX ADMIN — POTASSIUM CHLORIDE 40 MEQ: 1500 TABLET, EXTENDED RELEASE ORAL at 11:22

## 2021-12-08 RX ADMIN — METOCLOPRAMIDE HYDROCHLORIDE 5 MG: 5 INJECTION INTRAMUSCULAR; INTRAVENOUS at 14:48

## 2021-12-08 RX ADMIN — SEVELAMER CARBONATE 800 MG: 800 TABLET, FILM COATED ORAL at 11:19

## 2021-12-08 ASSESSMENT — PAIN SCALES - GENERAL
PAINLEVEL_OUTOF10: 8
PAINLEVEL_OUTOF10: 8
PAINLEVEL_OUTOF10: 7
PAINLEVEL_OUTOF10: 0
PAINLEVEL_OUTOF10: 8
PAINLEVEL_OUTOF10: 4
PAINLEVEL_OUTOF10: 8
PAINLEVEL_OUTOF10: 7

## 2021-12-08 ASSESSMENT — PAIN DESCRIPTION - PAIN TYPE: TYPE: ACUTE PAIN

## 2021-12-08 ASSESSMENT — PAIN - FUNCTIONAL ASSESSMENT: PAIN_FUNCTIONAL_ASSESSMENT: ACTIVITIES ARE NOT PREVENTED

## 2021-12-08 ASSESSMENT — PAIN DESCRIPTION - LOCATION: LOCATION: ABDOMEN

## 2021-12-08 ASSESSMENT — PAIN DESCRIPTION - PROGRESSION
CLINICAL_PROGRESSION: GRADUALLY WORSENING
CLINICAL_PROGRESSION: GRADUALLY WORSENING

## 2021-12-08 ASSESSMENT — PAIN DESCRIPTION - FREQUENCY: FREQUENCY: CONTINUOUS

## 2021-12-08 ASSESSMENT — PAIN DESCRIPTION - ONSET: ONSET: PROGRESSIVE

## 2021-12-08 ASSESSMENT — PAIN DESCRIPTION - DESCRIPTORS: DESCRIPTORS: CRAMPING

## 2021-12-08 ASSESSMENT — PAIN DESCRIPTION - ORIENTATION: ORIENTATION: LEFT;LOWER

## 2021-12-08 NOTE — PROGRESS NOTES
Dr. Cj Greco MD,    Your patient is on a medication that requires a renal dose adjustment. Renal Function Assessment:    Date Body Weight IBW  Adjusted BW SCr  CrCl Dialysis status   12/8/2021 119 lb (54 kg)  Ideal body weight: 54.7 kg (120 lb 9.5 oz) Serum creatinine: 5.1 mg/dL (H) 12/08/21 0555  Estimated creatinine clearance: 14 mL/min (A) HD       Pharmacy has renally dose-adjusted the following medication(s):    Date Original Order Renally Adjusted Order   12/8/2021 Levaquin 500mg q24H Levaquin 500mg q48H       These changes were made per protocol according to the [facility] Renal Function-Based Dose Adjustments Policy    *Please note this dose may need readjusted if your patient's renal function significantly improves. Please contact pharmacy with any questions regarding these changes.     Charlene Mendez [facility]  12/8/2021  11:10 AM

## 2021-12-08 NOTE — PROGRESS NOTES
Hospitalist Progress Note      PCP: Yolanda Negro MD    Date of Admission: 2021    Chief Complaint: Abd pain, nausea, vomiting     Hospital Course: Ms. Allie Grimaldo, a 36y.o. year old female  who  has a past medical history of Acute congestive heart failure ([address] BC (acute kidney injury) ([address] Cardiac arrest ([address] Cephalgia, Chronic kidney disease, Depression, Diabetes mellitus ([address] Diabetic gastroparesis associated with type 1 diabetes mellitus ([address] Diabetic ketoacidosis ([address] Diabetic ketoacidosis with coma associated with type 1 diabetes mellitus ([address] Diabetic polyneuropathy associated with type 1 diabetes mellitus ([address] Drug use complicating pregnancy in third trimester, Endocarditis, ESRD (end stage renal disease) ([address] H/O cardiovascular stress test, Hemodialysis patient ([address] History of blood transfusion, Hyperlipidemia, Hyperosmolar hyperglycemic state (HHS) ([address] Hypothyroidism, Iron deficiency anemia, MDRO (multiple drug resistant organisms) resistance, MRSA (methicillin resistant Staphylococcus aureus), Non compliance w medication regimen, Other disorders of kidney and ureter, Pregnancy, Previous  delivery affecting pregnancy, antepartum, Previous stillbirth or demise, antepartum, Seizure ([address] Severe pre-eclampsia in third trimester, Shock liver, and Valvular endocarditis.      Patient came to our service with abdominal pain and distention started for about 1 week associated with nausea vomiting. CT of the abdomen show ascites severe patient denies history of ascites previously. She stopped using peritoneal dialysis about 1 month ago. Patient saying that for 1 week her abdomen become bigger and distended pain develop for the last 24-48 hrs. Subjective: Patient seen and examined by me personally she complains of severe abdominal pain and paracentesis is ordered.   She received p.o. pain medication but could not tolerated because of nausea and I ordered her IV pain medication with antiemetics  Patient is on IV antibiotics for possible SBP    Medications:  Reviewed    Infusion Medications    sodium chloride      dextrose       Scheduled Medications    potassium chloride  40 mEq Oral Once    epoetin nena-epbx  3,000 Units SubCUTAneous Once per day on Mon Wed Fri    sodium chloride flush  5-40 mL IntraVENous 2 times per day    enoxaparin  30 mg SubCUTAneous Daily    levofloxacin  500 mg IntraVENous Q24H    clindamycin (CLEOCIN) IV  600 mg IntraVENous Q8H    insulin lispro  0-6 Units SubCUTAneous TID     insulin lispro  0-3 Units SubCUTAneous Nightly    amLODIPine  5 mg Oral Daily    bumetanide  2 mg Oral BID    insulin glargine  6 Units SubCUTAneous BID    levothyroxine  75 mcg Oral Daily    metoprolol tartrate  50 mg Oral BID    rOPINIRole  0.25 mg Oral Nightly    sevelamer  800 mg Oral TID WC    vitamin D  50,000 Units Oral Weekly     PRN Meds: oxyCODONE, sodium chloride flush, sodium chloride, ondansetron **OR** ondansetron, polyethylene glycol, acetaminophen **OR** acetaminophen, glucose, dextrose, glucagon (rDNA), dextrose, sennosides-docusate sodium, pantoprazole      Intake/Output Summary (Last 24 hours) at 12/8/2021 1022  Last data filed at 12/8/2021 0858  Gross per 24 hour   Intake 0 ml   Output --   Net 0 ml       Exam:    /83   Pulse 97   Temp 98 °F (36.7 °C)   Resp 18   Ht 5' 4\" (1.626 m)   Wt 119 lb (54 kg)   SpO2 96%   BMI 20.43 kg/m²     General appearance: No apparent distress, appears stated age and cooperative. HEENT: Pupils equal, round, and reactive to light. Conjunctivae/corneas clear. Neck: Supple, with full range of motion. No jugular venous distention. Trachea midline. Respiratory:  Normal respiratory effort. Clear to auscultation, bilaterally without Rales/Wheezes/Rhonchi. Cardiovascular: Regular rate and rhythm with normal S1/S2 without murmurs, rubs or gallops.   Abdomen: Soft, non-tender, non-distended with normal bowel sounds. Musculoskeletal: No clubbing, cyanosis or edema bilaterally. Full range of motion without deformity. Skin: Skin color, texture, turgor normal.  No rashes or lesions. Neurologic:  Neurovascularly intact without any focal sensory/motor deficits. Cranial nerves: II-XII intact, grossly non-focal.  Psychiatric: Alert and oriented, thought content appropriate, normal insight    Labs:   Recent Labs     12/07/21  0124 12/08/21  0555   WBC 6.4 4.0*   HGB 9.9* 10.7*   HCT 31.2* 33.9*    127*     Recent Labs     12/07/21  0124 12/07/21  1850 12/08/21  0555    131* 134   K 2.8* 3.2* 3.3*   CL 92* 92* 95*   CO2 26 20* 24   BUN 10 14 14   CREATININE 4.4* 5.0* 5.1*   CALCIUM 9.0 8.5* 8.8   PHOS  --  3.3 3.4     Recent Labs     12/07/21  0124   AST 5   ALT <5   BILITOT <0.2   ALKPHOS 129*     Recent Labs     12/08/21  0817   INR 1.1     No results for input(s): CKTOTAL, TROPONINI in the last 72 hours.     Assessment/Plan:    Active Hospital Problems    Diagnosis Date Noted    Ascites [R18.8] 12/07/2021     Abdominal pain  Rule out SBP-paracentesis ordered  CT of the abdomen shows severe ascites  Will need paracentesis to rule out SBP  We will consult GI  Empirically started on IV Levaquin and Clinda  Patient has multiple allergies to IV antibiotics     New onset of ascites  Patient previously had shock liver in the third trimester with severe preeclampsia   will need paracentesis  GI consulted and follow-up     End-stage kidney disease  On hemodialysis per nephrology  Nephrology consulted and follow-up  Patient used to have peritoneal dialysis daily  Stopped peritoneal dialysis 1 month ago     Hypokalemia  Secondary to GI loss  Will replace     Anemia  Secondary to anemia of chronic disease  Continue monitor H&H  Transfuse if needed     Diabetes  Continue insulin sliding scales  Check A1C    DVT Prophylaxis: heparin   Diet: Diet NPO  Code Status: Full Code    PT/OT Eval Status: NA     Dispo - home when medically stable     Mg Wang MD

## 2021-12-08 NOTE — CONSULTS
History and Physical      ASSESSMENT AND PLAN:    31y/F w/ history of poorly controlled DMI c/b ESRD on HD and gastroparesis who presents w/ abdominal pain secondary to significant ascites and likely gastroparesis. PLAN:  1. Ascites:  -She is now s/p paracentesis w/ removal of 4L of fluid.  -Follow-up fluid studies. 2. Gastroparesis:  -The patient's symptoms are consistent with discomfort from gastroparesis and GERD. -Begin PPI BID. -I would recommend complete gastric rest for 24-48hours w/ NPO status.  -Ensure the patient gets adequate rest w/ use of medication if needed.  -After 24-48 hours, would slowly resume a clear liquid diet with gastroparesis portions.   -It will be important for the patient to begin a gastroparesis diet following this period of complete rest.    I will follow. Thank you for including us in the care of this patient. Please do not hesitate to contact us with any additional questions or concerns. Hardeep Donald MD  Gastroenterology/Hepatology  Advanced Endoscopy          HISTORY OF PRESENT ILLNESS:      Ms. Chris Khan is a 31y/F w/ history of uncontrolled DMI c/b ESRD on HD and gastroparesis who presents w/ abdominal distention and epigastric abdominal pain since Thanksgiving. On admission imaging which was personally reviewed and interpreted, a significant amount of abdominopelvic ascites was appreciated an IR-guided paracentesis was performed w/ removal of 4L of ascites w/ fluid studies pending. This helped some of her discomfort but did not help w/ her epigastric pain. She denies melena or hematochezia. She does note recent weight loss due to loss of appetite and inability to tolerate PO. She had normal LFTs and a normal lipase in the ED. She is s/p CCY.     Past Medical History:        Diagnosis Date    Acute congestive heart failure ([address]     BC (acute kidney injury) ([address] 10/01/2019    Cardiac arrest ([address] 02/15/2021    Cephalgia 10/09/2019    Chronic kidney disease     Depression     Diabetes mellitus ([address]     Diabetic gastroparesis associated with type 1 diabetes mellitus ([address] 2018    Diabetic ketoacidosis ([address] 2011    Diabetic ketoacidosis with coma associated with type 1 diabetes mellitus ([address] 2013    Diabetic polyneuropathy associated with type 1 diabetes mellitus ([address] 2020    Drug use complicating pregnancy in third trimester     Endocarditis 10/31/2020    ESRD (end stage renal disease) ([address] 2020    H/O cardiovascular stress test 2021    Lexiscan    Hemodialysis patient [facility])     History of blood transfusion 2019    Hyperlipidemia 10/08/2020    Hyperosmolar hyperglycemic state (HHS) ([address] 2020    Hypothyroidism 10/08/2020    Iron deficiency anemia 10/01/2019    MDRO (multiple drug resistant organisms) resistance     MRSA (methicillin resistant Staphylococcus aureus)     back wound abcess    Non compliance w medication regimen 2016    Other disorders of kidney and ureter     Pregnancy 2016    16 weeks    Previous  delivery affecting pregnancy, antepartum 2017    Previous stillbirth or demise, antepartum 2016    Seizure ([address] 2020    Severe pre-eclampsia in third trimester 2016    Shock liver 02/15/2021    Valvular endocarditis 11/10/2020    This Diagnosis was added to the Problem List based on transcribed orders from Dr. Shahnaz Fulton        Past Surgical History:        Procedure Laterality Date    BACK SURGERY      abscess    CATHETER REMOVAL N/A 10/1/2021    PERITONEAL CATHETER REMOVAL performed by Jackson Modi MD at \[facility]\"" 49      x2   [facility], LAPAROSCOPIC N/A 2019    CHOLECYSTECTOMY LAPAROSCOPIC performed by Gilberto Canales MD at [facility] 80 N/A 2018    COLONOSCOPY WITH BIOPSY performed by Jan Montejo MD at [address] [facility] N/A 2018    COLONOSCOPY WITH BIOPSY performed by Katelynn Urbina MD at [address]  1/31/2012    EF 57%    ECHO COMPL W DOP COLOR FLOW  6/10/2013         EMBOLECTOMY N/A 11/6/2020    [address] [place] [address], [address] [facility], YULISSA -- REQS ROOM 3 performed by Messi Larry MD at [address] [facility] Left 2/23/2021    LEFT LEG INCISION AND DRAINAGE, DEBRIDEMENT, WOUND VAC APPLICATION performed by Pablo Valle DPM at [address] Left 5/18/2021    LEFT LEG DEBRIDEMENT BIOPSY POSS APPLICATION WOUND VAC performed by Pablo Valle DPM at [place] [address] N/A 6/26/2020    LAPAROSCOPIC INSERTION PERITONEAL DIALYSIS CATHETER performed by Rach Ruggiero MD at [facility] 80 ESOPHAGOGASTRODUODENOSCOPY TRANSORAL DIAGNOSTIC N/A 5/30/2018    EGD ESOPHAGOGASTRODUODENOSCOPY performed by Matthew Chahal MD at [address] [place] [address] TRANSESOPHAGEAL ECHOCARDIOGRAM N/A 10/19/2020    TRANSESOPHAGEAL ECHOCARDIOGRAM WITH BUBBLE STUDY performed by Amanda Radford MD at [address] [place] [address] TUNNELED VENOUS PORT PLACEMENT  04/2018    UPPER GASTROINTESTINAL ENDOSCOPY  12/18/2018    EGD BIOPSY performed by Katelynn Urbina MD at [facility] 10/11/2019    EGD ESOPHAGOGASTRODUODENOSCOPY performed by Jalen Coley DO at [facility] N/A 7/14/2021    EGD BIOPSY performed by Krista Jordan MD at [facility] [facility] [address] History:    TOBACCO:   reports that she quit smoking about 10 months ago. Her smoking use included cigarettes. She has a 3.00 pack-year smoking history. She has never used smokeless tobacco.  ETOH:   reports no history of alcohol use. DRUGS:   reports current drug use. Drug: Opiates .   Family History:       Problem Relation Age of Onset   Nick Asthma Mother     Hypertension Mother     High Blood Pressure Mother     Diabetes Mother     Asthma Brother     High Blood Pressure Father          Current Facility-Administered Medications:     oxyCODONE (ROXICODONE) immediate release tablet 5 mg, 5 mg, Oral, Q4H PRN, César ROBERTSON MD, 5 mg at 12/08/21 1118    heparin (porcine) injection 5,000 Units, 5,000 Units, SubCUTAneous, Q8H, César ROBERTSON MD, 5,000 Units at 12/08/21 1449    [START ON 12/9/2021] levoFLOXacin (LEVAQUIN) 500 MG/100ML infusion 500 mg, 500 mg, IntraVENous, Q48H, Evelyn Pak MD    metoclopramide (REGLAN) injection 5 mg, 5 mg, IntraVENous, 4x Daily AC & HS, HEBER Ball - CNP, 5 mg at 12/08/21 1448    pantoprazole (PROTONIX) injection 40 mg, 40 mg, IntraVENous, Daily, 40 mg at 12/08/21 1448 **AND** sodium chloride (PF) 0.9 % injection 10 mL, 10 mL, IntraVENous, Daily, HEBER Ball - CNP, 10 mL at 12/08/21 1449    dextrose 5 % and 0.9 % sodium chloride infusion, , IntraVENous, Continuous, HEBER Ball - CNP, Last Rate: 50 mL/hr at 12/08/21 1426, New Bag at 12/08/21 1426    HYDROmorphone (DILAUDID) injection 1 mg, 1 mg, IntraVENous, Q3H PRN, César ROBERTSON MD, 1 mg at 12/08/21 1724    promethazine (PHENERGAN) injection 12.5 mg, 12.5 mg, IntraVENous, Q6H PRN, Evelyn Pak MD    epoetin nena-epbx (RETACRIT) injection 3,000 Units, 3,000 Units, SubCUTAneous, Once per day on Mon Wed Fri, HEBER Krishnamurthy CNP    sodium chloride flush 0.9 % injection 5-40 mL, 5-40 mL, IntraVENous, 2 times per day, Evelyn Pak MD    sodium chloride flush 0.9 % injection 5-40 mL, 5-40 mL, IntraVENous, PRN, Evelyn Pak MD    0.9 % sodium chloride infusion, 25 mL, IntraVENous, PRN, Evelyn Pak MD    ondansetron (ZOFRAN-ODT) disintegrating tablet 4 mg, 4 mg, Oral, Q8H PRN **OR** ondansetron (ZOFRAN) injection 4 mg, 4 mg, IntraVENous, Q6H PRN, Evelyn Pak MD    polyethylene glycol (GLYCOLAX) packet 17 g, 17 g, Oral, Daily PRN, Evelyn Pak MD    acetaminophen (TYLENOL) tablet 650 mg, 650 mg, Oral, Q6H PRN **OR** acetaminophen (TYLENOL) suppository 650 mg, 650 mg, Rectal, Q6H PRN, Bhupinder Escalona MD    clindamycin (CLEOCIN) 600 mg in dextrose 5 % 50 mL IVPB, 600 mg, IntraVENous, Q8H, Agustina ROBERTSON MD, Stopped at 12/08/21 0341    insulin lispro (HUMALOG) injection vial 0-6 Units, 0-6 Units, SubCUTAneous, TID WC, Agustina ROBERTSON MD, 6 Units at 12/07/21 1937    insulin lispro (HUMALOG) injection vial 0-3 Units, 0-3 Units, SubCUTAneous, Nightly, Agustina ROBERTSON MD, 3 Units at 12/07/21 2150    glucose (GLUTOSE) 40 % oral gel 15 g, 15 g, Oral, PRN, Agustina ROBERTSON MD, 15 g at 12/08/21 1337    dextrose 50 % IV solution, 12.5 g, IntraVENous, PRN, Bhupinder Escalona MD    glucagon (rDNA) injection 1 mg, 1 mg, IntraMUSCular, PRN, Bhupinder Escalona MD    dextrose 5 % solution, 100 mL/hr, IntraVENous, PRN, Bhupinder Escalona MD    [Held by provider] amLODIPine (NORVASC) tablet 5 mg, 5 mg, Oral, Daily, Agustina ROBERTSON MD, 5 mg at 12/08/21 1118    [Held by provider] bumetanide (BUMEX) tablet 2 mg, 2 mg, Oral, BID, Agustina ROBERTSON MD, 2 mg at 12/08/21 1118    insulin glargine (LANTUS) injection vial 6 Units, 6 Units, SubCUTAneous, BID, Agustina ROBERTSON MD, 6 Units at 12/07/21 2137    levothyroxine (SYNTHROID) tablet 75 mcg, 75 mcg, Oral, Daily, Bhupinder Escalona MD    metoprolol tartrate (LOPRESSOR) tablet 50 mg, 50 mg, Oral, BID, Agustina ROBERTSON MD, 50 mg at 12/08/21 1119    rOPINIRole (REQUIP) tablet 0.25 mg, 0.25 mg, Oral, Nightly, Agustina ROBERTSON MD, 0.25 mg at 12/07/21 2138    sennosides-docusate sodium (SENOKOT-S) 8.6-50 MG tablet 2 tablet, 2 tablet, Oral, Nightly PRN, Bhupinder Escalona MD    [Held by provider] sevelamer (RENVELA) tablet 800 mg, 800 mg, Oral, TID WC, Agustina ROBERTSON MD, 800 mg at 12/08/21 1119    vitamin D (ERGOCALCIFEROL) capsule 50,000 Units, 50,000 Units, Oral, Weekly, Bhupinder Escalona MD, 50,000 Units at 12/08/21 1119     Allergies:  Cefepime and Toradol [ketorolac tromethamine]    ROS:  General: Patient denies f/c. She has had nausea and weight loss.    HEENT: Patient denies persistent postnasal drip, scleral icterus, drooling, persistent bleeding from nose/mouth. Resp: Patient denies SOB, wheezing, productive cough. Cards: Patient denies CP, palpitations, significant edema  GI: As above. Derm: Patient denies jaundice/rashes. Musc: Patient denies diffuse/irregular joint swelling or myalgias. PHYSICAL EXAM:  BP (!) 156/96   Pulse 86   Temp 97.6 °F (36.4 °C) (Temporal)   Resp 14   Ht 5' 4\" (1.626 m)   Wt 119 lb (54 kg)   SpO2 100%   BMI 20.43 kg/m²   Physical Exam:  General: Overall well-appearing, NAD  HEENT: PERRLA, EOMI, Anicteric sclera, MMM, no rhinorrhea  Cards: RRR, LE edema present  Resp: Breathing comfortably on room air, good air movement, no use of accessory muscles, no audible wheezing  Abdomen: soft, non-distended. Tenderness in the epigastrium. Extremities: Moves all extremities, no effusions or bruising.   Skin: No rashes or jaundice  Neuro: A&O x 3, CN grossly intact, non-focal exam              Electronically signed by Gia Hyde MD on 12/8/2021 at 6:21 PM

## 2021-12-08 NOTE — PROGRESS NOTES
Department of Internal Medicine  Nephrology Nurse Practitioner Progress Note    Events Reviewed. Subjective: We are following Miss Shyam Lo for ESRD on Hemodialysis. She complaints of abdominal pain.     PHYSICAL EXAM:      Vitals:    VITALS:  /83   Pulse 97   Temp 98 °F (36.7 °C)   Resp 18   Ht 5' 4\" (1.626 m)   Wt 119 lb (54 kg)   SpO2 96%   BMI 20.43 kg/m²   24HR INTAKE/OUTPUT:      Intake/Output Summary (Last 24 hours) at 12/8/2021 1006  Last data filed at 12/8/2021 0858  Gross per 24 hour   Intake 0 ml   Output --   Net 0 ml       Access: RIJ tunneled dialysis catheter  Constitutional:  Awake, alert, oriented x3,   HEENT:  PERRL, normocephalic, atraumatic  Respiratory:  CTA bilateral   Cardiovascular/Edema:  ST, RRR, no murmur gallop or rub  Gastrointestinal:  abd distended, c/o persistent abdominal pain  Neurologic:  Awake and alert,, follows commands, no focal deficit  Skin:  Warm, dry, ecchymotic areas to abdomen  Other:      Scheduled Meds:   epoetin nena-epbx  3,000 Units SubCUTAneous Once per day on Mon Wed Fri    sodium chloride flush  5-40 mL IntraVENous 2 times per day    enoxaparin  30 mg SubCUTAneous Daily    levofloxacin  500 mg IntraVENous Q24H    clindamycin (CLEOCIN) IV  600 mg IntraVENous Q8H    insulin lispro  0-6 Units SubCUTAneous TID     insulin lispro  0-3 Units SubCUTAneous Nightly    amLODIPine  5 mg Oral Daily    bumetanide  2 mg Oral BID    insulin glargine  6 Units SubCUTAneous BID    levothyroxine  75 mcg Oral Daily    metoprolol tartrate  50 mg Oral BID    rOPINIRole  0.25 mg Oral Nightly    sevelamer  800 mg Oral TID     vitamin D  50,000 Units Oral Weekly     Continuous Infusions:   sodium chloride      dextrose       PRN Meds:.sodium chloride flush, sodium chloride, ondansetron **OR** ondansetron, polyethylene glycol, acetaminophen **OR** acetaminophen, glucose, dextrose, glucagon (rDNA), dextrose, sennosides-docusate sodium, pantoprazole        DATA:    CBC with Differential:    Lab Results   Component Value Date    WBC 4.0 12/08/2021    RBC 3.88 12/08/2021    HGB 10.7 12/08/2021    HCT 33.9 12/08/2021     12/08/2021    MCV 87.4 12/08/2021    MCH 27.6 12/08/2021    MCHC 31.6 12/08/2021    RDW 16.7 12/08/2021    NRBC 0.9 08/10/2020    SEGSPCT 67 06/27/2013    METASPCT 1.7 08/10/2020    LYMPHOPCT 16.4 12/07/2021    MONOPCT 7.2 12/07/2021    BASOPCT 1.1 12/07/2021    MONOSABS 0.46 12/07/2021    LYMPHSABS 1.05 12/07/2021    EOSABS 0.14 12/07/2021    BASOSABS 0.07 12/07/2021     CMP:    Lab Results   Component Value Date     12/08/2021    K 3.3 12/08/2021    K 4.2 11/24/2021    CL 95 12/08/2021    CO2 24 12/08/2021    BUN 14 12/08/2021    CREATININE 5.1 12/08/2021    GFRAA 12 12/08/2021    LABGLOM 12 12/08/2021    GLUCOSE 100 12/08/2021    GLUCOSE 130 05/18/2012    PROT 6.1 12/07/2021    LABALBU 3.1 12/07/2021    LABALBU 4.1 05/18/2012    CALCIUM 8.8 12/08/2021    BILITOT <0.2 12/07/2021    ALKPHOS 129 12/07/2021    AST 5 12/07/2021    ALT <5 12/07/2021     Magnesium:    Lab Results   Component Value Date    MG 1.8 12/08/2021     Phosphorus:    Lab Results   Component Value Date    PHOS 3.4 12/08/2021     Radiology Review:    CT Abdomen and Pelvis - December 7, 2021   Severe ascites is the predominant finding in the abdomen and pelvis. Chest X-Ray December 7, 2021   Improving atelectasis versus infiltrate in lower lungs. BRIEF SUMMARY OF INITIAL CONSULT: Briefly, Miss Milagros Andrews is a 36year-old female with a PMH of ESRD on home hemodialysis 5 days/wk, (initiated September 2021, Type I DM, poorly controlled with recurrent admissions for DKA, HTN, gastroparesis, ascites, infective endocarditis, cardiac arrest, hypothyroidism and depression. She was admitted to [facility] on December 7, 2021, after presenting to the ED with complaints of chest pain, SOB, nausea and vomiting x 1 week, weakness and decreased appetite.  She denies missing any hemodialysis treatments. We are consulted for dialysis management. IMPRESSION/RECOMMENDATIONS:      1. ESRD on home hemodialysis 5 days/wk via RIJ tunneled dialysis catheter. HD initiated September 2021 after failed peritoneal dialysis with persistent hyperkalemia. For dialysis three times/wk TU,TH,Sat while inpatient. For dialysis tomorrow. 2. Hypokalemia, multifactorial - 2/2 GI losses from vomiting, diarrhea and poor oral intake in the setting of loop diuretic administration (takes Bumex at home). Will replace. 3. HTN, on lopressor and amlodipine at home  4. Vitamin D deficiency, on ergocalciferol at home  5. MBD of CKD, on sevelamer   6. Anemia of CKD,  Hgb 9.9 mg/dL today, on epoetin alpha 3,000 unit 3 times/wk  7. Ascites, etiology?, s/p paracentesis. LFT within normal limits  -------------------------------------------------------------------------------    8. Type I DM, poorly controlled with frequent readmissions for DKA  9. Restless leg syndrome, on ropinirole at home  10. Depression, on Abilify  11. Hypothyroidism, on levothyroxine   12. History of gastroparesis, on metoclopramide    13.  Diet NPO      PLAN:    · Continue HD TU-TH-Sat while inpatient  · Hold Bumex  · Hold Amlodipine  · Replace potassium   · Start Protonix 40 mg IV daily   · Start D5+0.9% NS at 50ml/hr  · Start Metoclopramide 5mg IV TID with meals and at bedtime x 2 days   · Monitor labs daily  · Continue epoetin alpha 3,000 units 3 times/wk  · Consider GI consult if abdominal pain persists

## 2021-12-08 NOTE — BRIEF OP NOTE
Brief Postoperative Note    Justina Black  YOB: 1990  [id number]    Pre-operative Diagnosis: ascites    Post-operative Diagnosis: Same    Procedure:paracentesis    Anesthesia: Local    Estimated Blood Loss: < 10 cc    Surgeon: Hiram CISSE     Complications: none    Specimen obtained: Yes, fluid, serous    Findings: fluid, drained with catheter drainage      Kyleigh Almeida II, MD   12/8/2021 1:36 PM

## 2021-12-08 NOTE — PROGRESS NOTES
Patient was identified via 2 patient identifiers and arrived to pre-work up area in stable condition. The patient is here for an ultrasound-guided abdominal paracentesis. Procedure explained by Surjit Silva RN and Dr. Abraham Toscano. All questions answered, patient expresses understanding and informed consent was signed. Pre-procedure routine/checklist was completed. The abdomen was scanned and marked under the guidance of ultrasound. The tray was prepared sterilely and the patient was prepped and draped sterilely. Lidocaine 2% 4mL was injected intradermally for anesthetic and an incision was made into the RLQ of the abdomen. Time Out: 1338  Procedure Start: [address]: 1339  Drain Pulled: 1400  Fluid drained: 4000mL, dark yellow colored fluid  Specimens were sent for testing. The patient tolerated the procedure well. The incision site cleansed and dry dressing of gauze and OpSite were applied. No bleeding, swelling or complications noted. The patient had no questions. Vital signs remained stable and the dressing remained clean, dry, and intact. The patient left the department in stable condition, with no complaints of pain or discomfort.

## 2021-12-08 NOTE — ED NOTES
Pt sitting up in bed and attempted to eat small amount. Pt watching tv and seems comfortable.      Carmen Carlos RN  12/07/21 2017

## 2021-12-08 NOTE — CARE COORDINATION
Met with pt at bedside to discuss discharge / transition of care plan. Pt reports from home with Mom; independent of all ADL however, does use transfer chair for long distances, denies further DME needs; verified PCP and pharmacy; discharge plan is to return home with no needs once medically stable per pt Mom will transport home. Pt requested pain meds however, none ordered, this cm messaged attending to request pain med, and check for general floor; pt okay for general floor transfer order obtained.

## 2021-12-08 NOTE — CONSULTS
Nanette Potter is a 35 yo female with a PMHx of T1DM, ESRD on hemodialysis, HLD, hypothyroidism who presented on 12/7 with abdominal pain and swelling that began 1.5 weeks prior to arrival, around Thanksgiving. Patient states that since this time, she has had abdominal pain that she describes as a burning/cramping sensation that radiates to her back also associated with n/v. Patient does not have a hx of GERD but will take TUMS on occasion for similar symptoms. She notes that she often experiences abdominal swelling secondary to her ESRD and did not notice that this swelling was any different than prior episodes. Patient has been on dialysis for about 2 years for ESRD secondary to uncontrolled diabetes mellitus. Does dialysis 5 days/week at home. Since this time period patient also states that she has lost 25 lbs likely secondary to lack of appetite and emesis. Patient denies any hematemesis. She also has irregular bowel movements. Patient states she often has diarrhea every few days with no bowel movements in between episodes. On admission, CT abd/pelvis showed severe ascites. Patient had IR guided paracentesis performed today and 4L dark yellow color fluid was drained. Patient states that her abdominal pain has improved since the procedure was done, but she still has a feeling of heartburn in her abdomen, specifically the epigastric region. Additionally, she feels hungry but does not have much of an appetite as she is afraid food will upset her stomach more.

## 2021-12-08 NOTE — ED NOTES
Per pharmacist, dialysis pts should not get lovenox.  Perfect serve message sent to physician to adjust to heparin, per pharm suggestion     Demarcus Al RN  12/08/21 3679

## 2021-12-08 NOTE — INTERVAL H&P NOTE
H&P Update    Patient's History and Physical  was reviewed. The patient appears likely to able to tolerate the procedure. Risk and benefits discussed including ultimate complications, possibly death and consent obtained.     Lalitha Marshall, II

## 2021-12-09 LAB
ANION GAP SERPL CALCULATED.3IONS-SCNC: 14 MMOL/L (ref 7–16)
BUN BLDV-MCNC: 16 MG/DL (ref 6–20)
CALCIUM SERPL-MCNC: 7.9 MG/DL (ref 8.6–10.2)
CHLORIDE BLD-SCNC: 98 MMOL/L (ref 98–107)
CO2: 21 MMOL/L (ref 22–29)
CREAT SERPL-MCNC: 6.2 MG/DL (ref 0.5–1)
GFR AFRICAN AMERICAN: 10
GFR NON-AFRICAN AMERICAN: 10 ML/MIN/1.73
GLUCOSE BLD-MCNC: 244 MG/DL (ref 74–99)
GRAM STAIN ORDERABLE: NORMAL
HCT VFR BLD CALC: 34.4 % (ref 34–48)
HEMOGLOBIN: 10.7 G/DL (ref 11.5–15.5)
MAGNESIUM: 1.7 MG/DL (ref 1.6–2.6)
MCH RBC QN AUTO: 28.2 PG (ref 26–35)
MCHC RBC AUTO-ENTMCNC: 31.1 % (ref 32–34.5)
MCV RBC AUTO: 90.5 FL (ref 80–99.9)
METER GLUCOSE: 112 MG/DL (ref 74–99)
METER GLUCOSE: 118 MG/DL (ref 74–99)
METER GLUCOSE: 176 MG/DL (ref 74–99)
METER GLUCOSE: 226 MG/DL (ref 74–99)
METER GLUCOSE: 231 MG/DL (ref 74–99)
METER GLUCOSE: 296 MG/DL (ref 74–99)
PDW BLD-RTO: 16.7 FL (ref 11.5–15)
PHOSPHORUS: 3.7 MG/DL (ref 2.5–4.5)
PLATELET # BLD: 234 E9/L (ref 130–450)
PMV BLD AUTO: 11.1 FL (ref 7–12)
POTASSIUM SERPL-SCNC: 3.9 MMOL/L (ref 3.5–5)
RBC # BLD: 3.8 E12/L (ref 3.5–5.5)
SODIUM BLD-SCNC: 133 MMOL/L (ref 132–146)
WBC # BLD: 8.2 E9/L (ref 4.5–11.5)

## 2021-12-09 PROCEDURE — 82962 GLUCOSE BLOOD TEST: CPT

## 2021-12-09 PROCEDURE — 84100 ASSAY OF PHOSPHORUS: CPT

## 2021-12-09 PROCEDURE — 2580000003 HC RX 258: Performed by: INTERNAL MEDICINE

## 2021-12-09 PROCEDURE — 36415 COLL VENOUS BLD VENIPUNCTURE: CPT

## 2021-12-09 PROCEDURE — 1200000000 HC SEMI PRIVATE

## 2021-12-09 PROCEDURE — 85027 COMPLETE CBC AUTOMATED: CPT

## 2021-12-09 PROCEDURE — 2500000003 HC RX 250 WO HCPCS: Performed by: INTERNAL MEDICINE

## 2021-12-09 PROCEDURE — 80048 BASIC METABOLIC PNL TOTAL CA: CPT

## 2021-12-09 PROCEDURE — 6370000000 HC RX 637 (ALT 250 FOR IP): Performed by: INTERNAL MEDICINE

## 2021-12-09 PROCEDURE — 6360000002 HC RX W HCPCS: Performed by: FAMILY MEDICINE

## 2021-12-09 PROCEDURE — 2580000003 HC RX 258: Performed by: NURSE PRACTITIONER

## 2021-12-09 PROCEDURE — 5A1D70Z PERFORMANCE OF URINARY FILTRATION, INTERMITTENT, LESS THAN 6 HOURS PER DAY: ICD-10-PCS | Performed by: INTERNAL MEDICINE

## 2021-12-09 PROCEDURE — 90935 HEMODIALYSIS ONE EVALUATION: CPT

## 2021-12-09 PROCEDURE — 6360000002 HC RX W HCPCS: Performed by: INTERNAL MEDICINE

## 2021-12-09 PROCEDURE — 83735 ASSAY OF MAGNESIUM: CPT

## 2021-12-09 PROCEDURE — 6360000002 HC RX W HCPCS: Performed by: NURSE PRACTITIONER

## 2021-12-09 PROCEDURE — C9113 INJ PANTOPRAZOLE SODIUM, VIA: HCPCS | Performed by: NURSE PRACTITIONER

## 2021-12-09 RX ORDER — DOCUSATE SODIUM 100 MG/1
100 CAPSULE, LIQUID FILLED ORAL DAILY
Status: DISCONTINUED | OUTPATIENT
Start: 2021-12-09 | End: 2021-12-12 | Stop reason: HOSPADM

## 2021-12-09 RX ORDER — DIPHENHYDRAMINE HYDROCHLORIDE 50 MG/ML
25 INJECTION INTRAMUSCULAR; INTRAVENOUS EVERY 6 HOURS PRN
Status: DISCONTINUED | OUTPATIENT
Start: 2021-12-09 | End: 2021-12-12 | Stop reason: HOSPADM

## 2021-12-09 RX ORDER — INSULIN GLARGINE 100 [IU]/ML
10 INJECTION, SOLUTION SUBCUTANEOUS 2 TIMES DAILY
Status: DISCONTINUED | OUTPATIENT
Start: 2021-12-09 | End: 2021-12-11

## 2021-12-09 RX ORDER — METOCLOPRAMIDE HYDROCHLORIDE 5 MG/ML
5 INJECTION INTRAMUSCULAR; INTRAVENOUS EVERY 6 HOURS PRN
Status: DISCONTINUED | OUTPATIENT
Start: 2021-12-09 | End: 2021-12-12 | Stop reason: HOSPADM

## 2021-12-09 RX ADMIN — CLINDAMYCIN PHOSPHATE 600 MG: 600 INJECTION, SOLUTION INTRAVENOUS at 20:21

## 2021-12-09 RX ADMIN — SODIUM CHLORIDE, PRESERVATIVE FREE 10 ML: 5 INJECTION INTRAVENOUS at 11:23

## 2021-12-09 RX ADMIN — METOCLOPRAMIDE HYDROCHLORIDE 5 MG: 5 INJECTION INTRAMUSCULAR; INTRAVENOUS at 11:45

## 2021-12-09 RX ADMIN — CALCIUM GLUCONATE: 98 INJECTION, SOLUTION INTRAVENOUS at 18:19

## 2021-12-09 RX ADMIN — HYDROMORPHONE HYDROCHLORIDE 1 MG: 1 INJECTION, SOLUTION INTRAMUSCULAR; INTRAVENOUS; SUBCUTANEOUS at 11:23

## 2021-12-09 RX ADMIN — HYDROMORPHONE HYDROCHLORIDE 1 MG: 1 INJECTION, SOLUTION INTRAMUSCULAR; INTRAVENOUS; SUBCUTANEOUS at 21:30

## 2021-12-09 RX ADMIN — CLINDAMYCIN PHOSPHATE 600 MG: 600 INJECTION, SOLUTION INTRAVENOUS at 04:12

## 2021-12-09 RX ADMIN — HYDROMORPHONE HYDROCHLORIDE 1 MG: 1 INJECTION, SOLUTION INTRAMUSCULAR; INTRAVENOUS; SUBCUTANEOUS at 14:29

## 2021-12-09 RX ADMIN — HYDROMORPHONE HYDROCHLORIDE 1 MG: 1 INJECTION, SOLUTION INTRAMUSCULAR; INTRAVENOUS; SUBCUTANEOUS at 05:58

## 2021-12-09 RX ADMIN — LEVOFLOXACIN 500 MG: 5 INJECTION, SOLUTION INTRAVENOUS at 21:32

## 2021-12-09 RX ADMIN — PANTOPRAZOLE SODIUM 40 MG: 40 INJECTION, POWDER, FOR SOLUTION INTRAVENOUS at 11:24

## 2021-12-09 RX ADMIN — METOCLOPRAMIDE HYDROCHLORIDE 5 MG: 5 INJECTION INTRAMUSCULAR; INTRAVENOUS at 17:42

## 2021-12-09 RX ADMIN — INSULIN LISPRO 1 UNITS: 100 INJECTION, SOLUTION INTRAVENOUS; SUBCUTANEOUS at 20:20

## 2021-12-09 RX ADMIN — SODIUM CHLORIDE, PRESERVATIVE FREE 10 ML: 5 INJECTION INTRAVENOUS at 11:24

## 2021-12-09 RX ADMIN — METOCLOPRAMIDE HYDROCHLORIDE 5 MG: 5 INJECTION INTRAMUSCULAR; INTRAVENOUS at 20:21

## 2021-12-09 RX ADMIN — INSULIN GLARGINE 10 UNITS: 100 INJECTION, SOLUTION SUBCUTANEOUS at 20:20

## 2021-12-09 RX ADMIN — METOPROLOL TARTRATE 50 MG: 50 TABLET, FILM COATED ORAL at 11:39

## 2021-12-09 RX ADMIN — HEPARIN SODIUM 5000 UNITS: 10000 INJECTION INTRAVENOUS; SUBCUTANEOUS at 05:58

## 2021-12-09 RX ADMIN — DIPHENHYDRAMINE HYDROCHLORIDE 25 MG: 50 INJECTION, SOLUTION INTRAMUSCULAR; INTRAVENOUS at 21:30

## 2021-12-09 RX ADMIN — CLINDAMYCIN PHOSPHATE 600 MG: 600 INJECTION, SOLUTION INTRAVENOUS at 16:39

## 2021-12-09 RX ADMIN — HYDROMORPHONE HYDROCHLORIDE 1 MG: 1 INJECTION, SOLUTION INTRAMUSCULAR; INTRAVENOUS; SUBCUTANEOUS at 17:41

## 2021-12-09 RX ADMIN — HEPARIN SODIUM 5000 UNITS: 10000 INJECTION INTRAVENOUS; SUBCUTANEOUS at 20:20

## 2021-12-09 RX ADMIN — METOCLOPRAMIDE HYDROCHLORIDE 5 MG: 5 INJECTION INTRAMUSCULAR; INTRAVENOUS at 05:58

## 2021-12-09 RX ADMIN — DEXTROSE AND SODIUM CHLORIDE: 5; 900 INJECTION, SOLUTION INTRAVENOUS at 16:39

## 2021-12-09 RX ADMIN — HEPARIN SODIUM 5000 UNITS: 10000 INJECTION INTRAVENOUS; SUBCUTANEOUS at 14:29

## 2021-12-09 ASSESSMENT — PAIN SCALES - GENERAL
PAINLEVEL_OUTOF10: 8
PAINLEVEL_OUTOF10: 5
PAINLEVEL_OUTOF10: 7
PAINLEVEL_OUTOF10: 5
PAINLEVEL_OUTOF10: 8

## 2021-12-09 ASSESSMENT — PAIN DESCRIPTION - ORIENTATION: ORIENTATION: LEFT;LOWER

## 2021-12-09 ASSESSMENT — PAIN DESCRIPTION - PAIN TYPE: TYPE: ACUTE PAIN

## 2021-12-09 ASSESSMENT — PAIN DESCRIPTION - FREQUENCY: FREQUENCY: INTERMITTENT

## 2021-12-09 ASSESSMENT — PAIN DESCRIPTION - DESCRIPTORS: DESCRIPTORS: CRAMPING

## 2021-12-09 ASSESSMENT — PAIN DESCRIPTION - LOCATION: LOCATION: ABDOMEN

## 2021-12-09 ASSESSMENT — PAIN DESCRIPTION - ONSET: ONSET: ON-GOING

## 2021-12-09 ASSESSMENT — PAIN DESCRIPTION - PROGRESSION: CLINICAL_PROGRESSION: GRADUALLY WORSENING

## 2021-12-09 ASSESSMENT — PAIN - FUNCTIONAL ASSESSMENT: PAIN_FUNCTIONAL_ASSESSMENT: ACTIVITIES ARE NOT PREVENTED

## 2021-12-09 NOTE — FLOWSHEET NOTE
12/09/21 1037   Vital Signs   BP (!) 95/33   Temp 97.4 °F (36.3 °C)   Pulse 99   Weight 128 lb 15.5 oz (58.5 kg)   Weight Method Bed scale   Percent Weight Change -0.85   Post-Hemodialysis Assessment   Post-Treatment Procedures Blood returned; Catheter capped, clamped and heparinized x 2 ports   Machine Disinfection Process Exterior Machine Disinfection   Rinseback Volume (ml) 300 ml   Total Liters Processed (l/min) 78.1 l/min   Dialyzer Clearance Lightly streaked   Duration of Treatment (minutes) 210 minutes   Heparin amount administered during treatment (units) 0 units   Hemodialysis Intake (ml) 300 ml   Hemodialysis Output (ml) 528 ml   NET Removed (ml) 228 ml   Tolerated Treatment Good   Patient Response to Treatment tolerated well, blood returned, cath care per policy/procedure, lines flushed, heparin instilled, ports capped

## 2021-12-09 NOTE — PROGRESS NOTES
Department of Internal Medicine  Nephrology Progress Note    Events Reviewed. Seen on HD. Subjective: We are following Miss Shruti Dunbar for ESRD on Hemodialysis. She is complaining of abdominal pain.     PHYSICAL EXAM:      Vitals:    VITALS:  /77   Pulse 93   Temp 97.6 °F (36.4 °C) (Oral)   Resp 16   Ht 5' 4\" (1.626 m)   Wt 128 lb 15.5 oz (58.5 kg)   SpO2 97%   BMI 22.14 kg/m²   24HR INTAKE/OUTPUT:      Intake/Output Summary (Last 24 hours) at 12/9/2021 1414  Last data filed at 12/9/2021 1037  Gross per 24 hour   Intake 1725 ml   Output 528 ml   Net 1197 ml       Access: RIJ tunneled dialysis catheter  Constitutional:  Awake, alert, oriented x3,   HEENT:  PERRL, normocephalic, atraumatic  Respiratory:  CTA bilateral   Cardiovascular/Edema:  ST, RRR, no murmur gallop or rub  Gastrointestinal:  abd distended, c/o persistent abdominal pain  Neurologic:  Awake and alert,, follows commands, no focal deficit  Skin:  Warm, dry, ecchymotic areas to abdomen  Other:      Scheduled Meds:   heparin (porcine)  5,000 Units SubCUTAneous Q8H    levofloxacin  500 mg IntraVENous Q48H    metoclopramide  5 mg IntraVENous 4x Daily AC & HS    pantoprazole  40 mg IntraVENous Daily    And    sodium chloride (PF)  10 mL IntraVENous Daily    epoetin nena-epbx  3,000 Units SubCUTAneous Once per day on Mon Wed Fri    sodium chloride flush  5-40 mL IntraVENous 2 times per day    clindamycin (CLEOCIN) IV  600 mg IntraVENous Q8H    insulin lispro  0-6 Units SubCUTAneous TID WC    insulin lispro  0-3 Units SubCUTAneous Nightly    [Held by provider] amLODIPine  5 mg Oral Daily    [Held by provider] bumetanide  2 mg Oral BID    insulin glargine  6 Units SubCUTAneous BID    levothyroxine  75 mcg Oral Daily    metoprolol tartrate  50 mg Oral BID    rOPINIRole  0.25 mg Oral Nightly    [Held by provider] sevelamer  800 mg Oral TID     vitamin D  50,000 Units Oral Weekly     Continuous Infusions:   PN-Adult 3 IN 1 Peripheral Line      dextrose 5 % and 0.9 % NaCl 50 mL/hr at 12/08/21 1426    sodium chloride      dextrose       PRN Meds:.metoclopramide, oxyCODONE, HYDROmorphone, promethazine, sodium chloride flush, sodium chloride, ondansetron **OR** ondansetron, polyethylene glycol, acetaminophen **OR** acetaminophen, glucose, dextrose, glucagon (rDNA), dextrose, sennosides-docusate sodium        DATA:    CBC with Differential:    Lab Results   Component Value Date    WBC 8.2 12/09/2021    RBC 3.80 12/09/2021    HGB 10.7 12/09/2021    HCT 34.4 12/09/2021     12/09/2021    MCV 90.5 12/09/2021    MCH 28.2 12/09/2021    MCHC 31.1 12/09/2021    RDW 16.7 12/09/2021    NRBC 0.9 08/10/2020    SEGSPCT 67 06/27/2013    METASPCT 1.7 08/10/2020    LYMPHOPCT 16.4 12/07/2021    MONOPCT 7.2 12/07/2021    BASOPCT 1.1 12/07/2021    MONOSABS 0.46 12/07/2021    LYMPHSABS 1.05 12/07/2021    EOSABS 0.14 12/07/2021    BASOSABS 0.07 12/07/2021     CMP:    Lab Results   Component Value Date     12/09/2021    K 3.9 12/09/2021    K 4.2 11/24/2021    CL 98 12/09/2021    CO2 21 12/09/2021    BUN 16 12/09/2021    CREATININE 6.2 12/09/2021    GFRAA 10 12/09/2021    LABGLOM 10 12/09/2021    GLUCOSE 244 12/09/2021    GLUCOSE 130 05/18/2012    PROT 6.1 12/07/2021    LABALBU 3.1 12/07/2021    LABALBU 4.1 05/18/2012    CALCIUM 7.9 12/09/2021    BILITOT <0.2 12/07/2021    ALKPHOS 129 12/07/2021    AST 5 12/07/2021    ALT <5 12/07/2021     Magnesium:    Lab Results   Component Value Date    MG 1.7 12/09/2021     Phosphorus:    Lab Results   Component Value Date    PHOS 3.7 12/09/2021     Radiology Review:    CT Abdomen and Pelvis - December 7, 2021   Severe ascites is the predominant finding in the abdomen and pelvis. Chest X-Ray December 7, 2021   Improving atelectasis versus infiltrate in lower lungs.          BRIEF SUMMARY OF INITIAL CONSULT: Briefly, Miss Maldonado Her is a 36year-old female with a PMH of ESRD on home hemodialysis 5 days/wk, (initiated September 2021, Type I DM, poorly controlled with recurrent admissions for DKA, HTN, gastroparesis, ascites, infective endocarditis, cardiac arrest, hypothyroidism and depression. She was admitted to [facility] on December 7, 2021, after presenting to the ED with complaints of chest pain, SOB, nausea and vomiting x 1 week, weakness and decreased appetite. She denies missing any hemodialysis treatments. We are consulted for dialysis management. IMPRESSION/RECOMMENDATIONS:      1. ESRD on home hemodialysis 5 days/wk via RIJ tunneled dialysis catheter. HD initiated September 2021 after failed peritoneal dialysis with persistent hyperkalemia. For dialysis three times/wk TU,TH,Sat while inpatient. Seen on dialysis, tolerating well. 2. Hypokalemia, multifactorial - 2/2 GI losses from vomiting, diarrhea and poor oral intake in the setting of loop diuretic administration (takes Bumex at home). Improved. 3. HTN, on metoprolol; amlodipine on hold due to hypotension   4. Vitamin D deficiency, on ergocalciferol   5. MBD of CKD, on sevelamer at home, currently on hold   6. Anemia of CKD,  on epoetin alpha 3,000 unit 3 times/wk  7. Ascites, etiology?, s/p paracentesis. GI following.   -------------------------------------------------------------------------------  8. Type I DM, poorly controlled with frequent readmissions for DKA  9. Restless leg syndrome, on ropinirole at home  10. Depression, on Abilify  11. Hypothyroidism, on levothyroxine   12. History of gastroparesis, on metoclopramide    13.  Diet NPO, will start on PPN      PLAN:    · Continue HD TU-TH-Sat while inpatient  · Stop D5NS at 50 cc/hr once PPN starts  · Start PPN  · Continue epoetin alpha 3,000 units three times weekly   · Continue pantoprazole 40 mg IV daily  · Continue metoclopramide 5 mg IV QID  · Continue to hold Bumex  · Continue to hold amlodipine   · Continue to hold sevelamer  · Continue to monitor labs daily  · Obtain ionized calcium level       Electronically signed by HEBER Gomez CNP on 12/9/2021 at 2:14 PM

## 2021-12-09 NOTE — PROGRESS NOTES
Comprehensive Nutrition Assessment    Type and Reason for Visit:  Initial, Positive Nutrition Screen    Nutrition Recommendations/Plan: Advance diet as appropriate. Will start Gelatein 20 BID to supplement intake while on CL diet. Nutrition Assessment:  Pt admitted w/ hypokalemia, ascites, N/V x 1 wk, and chest pain. PMH of CHF, DM, ESRD on HD, gastroparesis, and malnutrition. Pt s/p paracentesis (-4L) and on gastric rest w/ plan for diet advancement. PPN started. Recommend continue current PPN regimen and diet advancement as tolerated. Will start ONS to supplement intake, please consult for TPN recommendations if diet unable to be advanced. Malnutrition Assessment:  Malnutrition Status: At risk for malnutrition (Comment)    Context:  Acute Illness     Findings of the 6 clinical characteristics of malnutrition:  Energy Intake:  1 - 75% or less of estimated energy requirements for 7 or more days  Weight Loss:  No significant weight loss     Body Fat Loss:  No significant body fat loss     Muscle Mass Loss:  No significant muscle mass loss    Fluid Accumulation:  No significant fluid accumulation     Strength:  Not Performed    Estimated Daily Nutrient Needs:  Energy (kcal):  3187-9989 (30-32 kcal/kg); Weight Used for Energy Requirements:  Current     Protein (g):  80-90 (1.3-1.5 gm/kg);  Weight Used for Protein Requirements:  Current        Fluid (ml/day):  per nephrology; Method Used for Fluid Requirements:  Standard Renal      Nutrition Related Findings:  Pt A/Ox4, abd soft rounded tender, +1.2L I/O, no edema, renal labs elevated, hyperglycemia, PPN      Wounds:  None       Current Nutrition Therapies:    Current Parenteral Nutrition Orders:  · Type and Formula: 3-in-1 Peripheral   · Lipids: None  · Duration: Continuous  · Rate/Volume: 3-in-1 PPN @41.7 ml/hr x 24 hr to provide: 1000 ml TV  · Goal PN Orders Provides: 25 gm AA, 70 gm dex, 40 gm lipid, 738 kcal    Anthropometric Measures:  · Height: 5' 4\" (162.6 cm)  · Current Body Weight: 128 lb 15.5 oz (58.5 kg) (12/9 bed scale)   · Admission Body Weight: 130 lb 1.1 oz (59 kg) (12/9 bed scale)    · Usual Body Weight: 136 lb (61.7 kg) (6/26/21 no edema bed scale per EMR, variable EMR wt hx d/t fluid fluctuations and previously on PD)     · Ideal Body Weight: 120 lbs; % Ideal Body Weight 107.5 %   · BMI: 22.1  · Adjusted Body Weight: No Adjustment   · BMI Categories: Normal Weight (BMI 18.5-24. 9)       Nutrition Diagnosis:   · Inadequate oral intake related to renal dysfunction (ESRD on HD) as evidenced by poor intake prior to admission, dialysis    Nutrition Interventions:   Food and/or Nutrient Delivery:  Continue Current Diet, Start Oral Nutrition Supplement, Continue Current Parenteral Nutrition (Advance diet as appropriate. Will start Gelatein 20 BID to supplement intake while on CL diet.)  Nutrition Education/Counseling:  Education not appropriate   Coordination of Nutrition Care:  Continue to monitor while inpatient    Goals:  Pt is to tolerate PPN and nutrition progression       Nutrition Monitoring and Evaluation:   Behavioral-Environmental Outcomes:  None Identified   Food/Nutrient Intake Outcomes:  Food and Nutrient Intake, Supplement Intake, Parenteral Nutrition Intake/Tolerance  Physical Signs/Symptoms Outcomes:  Biochemical Data, GI Status, Fluid Status or Edema, Nutrition Focused Physical Findings, Skin, Weight, Nausea or Vomiting     Discharge Planning:     Too soon to determine     Electronically signed by Herminio Boo RD, LD on 12/9/21 at 4:38 PM EST    Contact: [phone]

## 2021-12-09 NOTE — PROGRESS NOTES
Hospitalist Progress Note      PCP: Lester Chakraborty MD    Date of Admission: 2021    Chief Complaint: Abd pain, nausea, vomiting     Hospital Course: Ms. Lester Melendez, a 36y.o. year old female  who  has a past medical history of Acute congestive heart failure ([address] BC (acute kidney injury) ([address] Cardiac arrest ([address] Cephalgia, Chronic kidney disease, Depression, Diabetes mellitus ([address] Diabetic gastroparesis associated with type 1 diabetes mellitus ([address] Diabetic ketoacidosis ([address] Diabetic ketoacidosis with coma associated with type 1 diabetes mellitus ([address] Diabetic polyneuropathy associated with type 1 diabetes mellitus ([address] Drug use complicating pregnancy in third trimester, Endocarditis, ESRD (end stage renal disease) ([address] H/O cardiovascular stress test, Hemodialysis patient ([address] History of blood transfusion, Hyperlipidemia, Hyperosmolar hyperglycemic state (HHS) ([address] Hypothyroidism, Iron deficiency anemia, MDRO (multiple drug resistant organisms) resistance, MRSA (methicillin resistant Staphylococcus aureus), Non compliance w medication regimen, Other disorders of kidney and ureter, Pregnancy, Previous  delivery affecting pregnancy, antepartum, Previous stillbirth or demise, antepartum, Seizure ([address] Severe pre-eclampsia in third trimester, Shock liver, and Valvular endocarditis.      Patient came to our service with abdominal pain and distention started for about 1 week associated with nausea vomiting. CT of the abdomen show ascites severe patient denies history of ascites previously. She stopped using peritoneal dialysis about 1 month ago. Patient saying that for 1 week her abdomen become bigger and distended pain develop for the last 24-48 hrs. Subjective: Patient status post paracentesis pain she is feeling better but she did not have bowel movement for 4 days and do not want to take laxative will order stool softener.   Patient is on TPN per nephrology      medications:  Reviewed    Infusion Medications    PN-Adult 3 IN 1 Peripheral Line      dextrose 5 % and 0.9 % NaCl 50 mL/hr at 12/08/21 1426    sodium chloride      dextrose       Scheduled Medications    insulin glargine  10 Units SubCUTAneous BID    heparin (porcine)  5,000 Units SubCUTAneous Q8H    levofloxacin  500 mg IntraVENous Q48H    metoclopramide  5 mg IntraVENous 4x Daily AC & HS    pantoprazole  40 mg IntraVENous Daily    And    sodium chloride (PF)  10 mL IntraVENous Daily    epoetin nena-epbx  3,000 Units SubCUTAneous Once per day on Mon Wed Fri    sodium chloride flush  5-40 mL IntraVENous 2 times per day    clindamycin (CLEOCIN) IV  600 mg IntraVENous Q8H    insulin lispro  0-6 Units SubCUTAneous TID WC    insulin lispro  0-3 Units SubCUTAneous Nightly    [Held by provider] amLODIPine  5 mg Oral Daily    [Held by provider] bumetanide  2 mg Oral BID    levothyroxine  75 mcg Oral Daily    metoprolol tartrate  50 mg Oral BID    rOPINIRole  0.25 mg Oral Nightly    [Held by provider] sevelamer  800 mg Oral TID     vitamin D  50,000 Units Oral Weekly     PRN Meds: metoclopramide, oxyCODONE, HYDROmorphone, promethazine, sodium chloride flush, sodium chloride, ondansetron **OR** ondansetron, polyethylene glycol, acetaminophen **OR** acetaminophen, glucose, dextrose, glucagon (rDNA), dextrose, sennosides-docusate sodium      Intake/Output Summary (Last 24 hours) at 12/9/2021 1557  Last data filed at 12/9/2021 1037  Gross per 24 hour   Intake 1725 ml   Output 528 ml   Net 1197 ml       Exam:    /77   Pulse 93   Temp 97.6 °F (36.4 °C) (Oral)   Resp 16   Ht 5' 4\" (1.626 m)   Wt 128 lb 15.5 oz (58.5 kg)   SpO2 97%   BMI 22.14 kg/m²     General appearance: No apparent distress, appears stated age and cooperative. HEENT: Pupils equal, round, and reactive to light. Conjunctivae/corneas clear. Neck: Supple, with full range of motion. No jugular venous distention. Trachea midline. Respiratory:  Normal respiratory effort. Clear to auscultation, bilaterally without Rales/Wheezes/Rhonchi. Cardiovascular: Regular rate and rhythm with normal S1/S2 without murmurs, rubs or gallops. Abdomen: Soft, non-tender, non-distended with normal bowel sounds. Musculoskeletal: No clubbing, cyanosis or edema bilaterally. Full range of motion without deformity. Skin: Skin color, texture, turgor normal.  No rashes or lesions. Neurologic:  Neurovascularly intact without any focal sensory/motor deficits. Cranial nerves: II-XII intact, grossly non-focal.  Psychiatric: Alert and oriented, thought content appropriate, normal insight    Labs:   Recent Labs     12/07/21  0124 12/08/21  0555 12/09/21  0458   WBC 6.4 4.0* 8.2   HGB 9.9* 10.7* 10.7*   HCT 31.2* 33.9* 34.4    127* 234     Recent Labs     12/07/21  1850 12/08/21  0555 12/09/21  0458   * 134 133   K 3.2* 3.3* 3.9   CL 92* 95* 98   CO2 20* 24 21*   BUN 14 14 16   CREATININE 5.0* 5.1* 6.2*   CALCIUM 8.5* 8.8 7.9*   PHOS 3.3 3.4 3.7     Recent Labs     12/07/21  0124   AST 5   ALT <5   BILITOT <0.2   ALKPHOS 129*     Recent Labs     12/08/21  0817   INR 1.1     No results for input(s): Steve Anderson in the last 72 hours.     Assessment/Plan:    Active Hospital Problems    Diagnosis Date Noted    Ascites [R18.8] 12/07/2021     Abdominal pain  Secondary to gastroparesis and new onset of ascites  Rule out SBP-s/p paracentesis CT of the abdomen shows severe ascites  consult GI dated  Empirically started on IV Levaquin and Clinda  Patient has multiple allergies to IV antibiotics  Is on TPN per nephrology    New onset of ascites  Patient previously had shock liver in the third trimester with severe preeclampsia   will need paracentesis  GI consulted and follow-up     End-stage kidney disease  S/p hemodialysis today  On hemodialysis per nephrology  Nephrology consulted and follow-up  Patient used to have peritoneal dialysis daily  Stopped peritoneal dialysis 1 month ago     Hypokalemia/hypomagnesemia  Secondary to GI loss  Replaced     Anemia  Secondary to anemia of chronic disease  Continue monitor H&H  Transfuse if needed     Diabetes  Continue insulin sliding scales  Check A1C    DVT Prophylaxis: heparin   Diet: ADULT DIET;  Clear Liquid  PN-Adult 3 IN 1 Peripheral Line  Code Status: Full Code    PT/OT Eval Status: NA     Dispo - home when medically stable     Rj Chen MD

## 2021-12-10 LAB
ANION GAP SERPL CALCULATED.3IONS-SCNC: 17 MMOL/L (ref 7–16)
ANION GAP SERPL CALCULATED.3IONS-SCNC: 9 MMOL/L (ref 7–16)
BUN BLDV-MCNC: 6 MG/DL (ref 6–20)
BUN BLDV-MCNC: 6 MG/DL (ref 6–20)
CALCIUM IONIZED: 1.22 MMOL/L (ref 1.15–1.33)
CALCIUM SERPL-MCNC: 7.7 MG/DL (ref 8.6–10.2)
CALCIUM SERPL-MCNC: 7.8 MG/DL (ref 8.6–10.2)
CHLORIDE BLD-SCNC: 102 MMOL/L (ref 98–107)
CHLORIDE BLD-SCNC: 90 MMOL/L (ref 98–107)
CO2: 20 MMOL/L (ref 22–29)
CO2: 20 MMOL/L (ref 22–29)
CREAT SERPL-MCNC: 2.9 MG/DL (ref 0.5–1)
CREAT SERPL-MCNC: 3.3 MG/DL (ref 0.5–1)
GFR AFRICAN AMERICAN: 20
GFR AFRICAN AMERICAN: 23
GFR NON-AFRICAN AMERICAN: 20 ML/MIN/1.73
GFR NON-AFRICAN AMERICAN: 23 ML/MIN/1.73
GLUCOSE BLD-MCNC: 51 MG/DL (ref 74–99)
GLUCOSE BLD-MCNC: 846 MG/DL (ref 74–99)
HCT VFR BLD CALC: 26.6 % (ref 34–48)
HEMOGLOBIN: 9.2 G/DL (ref 11.5–15.5)
MAGNESIUM: 3.9 MG/DL (ref 1.6–2.6)
MCH RBC QN AUTO: 32.2 PG (ref 26–35)
MCHC RBC AUTO-ENTMCNC: 34.6 % (ref 32–34.5)
MCV RBC AUTO: 93 FL (ref 80–99.9)
METER GLUCOSE: 105 MG/DL (ref 74–99)
METER GLUCOSE: 119 MG/DL (ref 74–99)
METER GLUCOSE: 48 MG/DL (ref 74–99)
METER GLUCOSE: 49 MG/DL (ref 74–99)
METER GLUCOSE: 78 MG/DL (ref 74–99)
METER GLUCOSE: 94 MG/DL (ref 74–99)
PDW BLD-RTO: 17.4 FL (ref 11.5–15)
PHOSPHORUS: 1.9 MG/DL (ref 2.5–4.5)
PLATELET # BLD: 176 E9/L (ref 130–450)
PMV BLD AUTO: 12.6 FL (ref 7–12)
POTASSIUM SERPL-SCNC: 3 MMOL/L (ref 3.5–5)
POTASSIUM SERPL-SCNC: 4.7 MMOL/L (ref 3.5–5)
RBC # BLD: 2.86 E12/L (ref 3.5–5.5)
SODIUM BLD-SCNC: 127 MMOL/L (ref 132–146)
SODIUM BLD-SCNC: 131 MMOL/L (ref 132–146)
WBC # BLD: 4.3 E9/L (ref 4.5–11.5)

## 2021-12-10 PROCEDURE — 82330 ASSAY OF CALCIUM: CPT

## 2021-12-10 PROCEDURE — 6360000002 HC RX W HCPCS: Performed by: INTERNAL MEDICINE

## 2021-12-10 PROCEDURE — 1200000000 HC SEMI PRIVATE

## 2021-12-10 PROCEDURE — 2580000003 HC RX 258: Performed by: INTERNAL MEDICINE

## 2021-12-10 PROCEDURE — 36415 COLL VENOUS BLD VENIPUNCTURE: CPT

## 2021-12-10 PROCEDURE — 2580000003 HC RX 258: Performed by: NURSE PRACTITIONER

## 2021-12-10 PROCEDURE — 84100 ASSAY OF PHOSPHORUS: CPT

## 2021-12-10 PROCEDURE — 80048 BASIC METABOLIC PNL TOTAL CA: CPT

## 2021-12-10 PROCEDURE — C9113 INJ PANTOPRAZOLE SODIUM, VIA: HCPCS | Performed by: NURSE PRACTITIONER

## 2021-12-10 PROCEDURE — 6360000002 HC RX W HCPCS: Performed by: FAMILY MEDICINE

## 2021-12-10 PROCEDURE — 2700000000 HC OXYGEN THERAPY PER DAY

## 2021-12-10 PROCEDURE — 85027 COMPLETE CBC AUTOMATED: CPT

## 2021-12-10 PROCEDURE — 6370000000 HC RX 637 (ALT 250 FOR IP): Performed by: INTERNAL MEDICINE

## 2021-12-10 PROCEDURE — 83735 ASSAY OF MAGNESIUM: CPT

## 2021-12-10 PROCEDURE — 82962 GLUCOSE BLOOD TEST: CPT

## 2021-12-10 PROCEDURE — 2500000003 HC RX 250 WO HCPCS: Performed by: INTERNAL MEDICINE

## 2021-12-10 PROCEDURE — 6360000002 HC RX W HCPCS: Performed by: NURSE PRACTITIONER

## 2021-12-10 RX ORDER — DEXTROSE MONOHYDRATE 50 MG/ML
100 INJECTION, SOLUTION INTRAVENOUS PRN
Status: DISCONTINUED | OUTPATIENT
Start: 2021-12-10 | End: 2021-12-12 | Stop reason: HOSPADM

## 2021-12-10 RX ORDER — DEXTROSE MONOHYDRATE 25 G/50ML
12.5 INJECTION, SOLUTION INTRAVENOUS PRN
Status: DISCONTINUED | OUTPATIENT
Start: 2021-12-10 | End: 2021-12-12 | Stop reason: HOSPADM

## 2021-12-10 RX ORDER — NICOTINE POLACRILEX 4 MG
15 LOZENGE BUCCAL PRN
Status: DISCONTINUED | OUTPATIENT
Start: 2021-12-10 | End: 2021-12-12 | Stop reason: HOSPADM

## 2021-12-10 RX ADMIN — HYDROMORPHONE HYDROCHLORIDE 1 MG: 1 INJECTION, SOLUTION INTRAMUSCULAR; INTRAVENOUS; SUBCUTANEOUS at 00:37

## 2021-12-10 RX ADMIN — INSULIN GLARGINE 10 UNITS: 100 INJECTION, SOLUTION SUBCUTANEOUS at 21:51

## 2021-12-10 RX ADMIN — Medication 15 G: at 12:54

## 2021-12-10 RX ADMIN — HYDROMORPHONE HYDROCHLORIDE 1 MG: 1 INJECTION, SOLUTION INTRAMUSCULAR; INTRAVENOUS; SUBCUTANEOUS at 22:03

## 2021-12-10 RX ADMIN — HEPARIN SODIUM 5000 UNITS: 10000 INJECTION INTRAVENOUS; SUBCUTANEOUS at 06:37

## 2021-12-10 RX ADMIN — DOCUSATE SODIUM 100 MG: 100 CAPSULE ORAL at 08:41

## 2021-12-10 RX ADMIN — POLYETHYLENE GLYCOL 3350 17 G: 17 POWDER, FOR SOLUTION ORAL at 15:35

## 2021-12-10 RX ADMIN — HYDROMORPHONE HYDROCHLORIDE 1 MG: 1 INJECTION, SOLUTION INTRAMUSCULAR; INTRAVENOUS; SUBCUTANEOUS at 06:37

## 2021-12-10 RX ADMIN — INSULIN GLARGINE 10 UNITS: 100 INJECTION, SOLUTION SUBCUTANEOUS at 08:43

## 2021-12-10 RX ADMIN — METOCLOPRAMIDE HYDROCHLORIDE 5 MG: 5 INJECTION INTRAMUSCULAR; INTRAVENOUS at 14:26

## 2021-12-10 RX ADMIN — CLINDAMYCIN PHOSPHATE 600 MG: 600 INJECTION, SOLUTION INTRAVENOUS at 04:19

## 2021-12-10 RX ADMIN — DIPHENHYDRAMINE HYDROCHLORIDE 25 MG: 50 INJECTION, SOLUTION INTRAMUSCULAR; INTRAVENOUS at 15:35

## 2021-12-10 RX ADMIN — SODIUM CHLORIDE, PRESERVATIVE FREE 10 ML: 5 INJECTION INTRAVENOUS at 21:55

## 2021-12-10 RX ADMIN — METOCLOPRAMIDE HYDROCHLORIDE 5 MG: 5 INJECTION INTRAMUSCULAR; INTRAVENOUS at 18:06

## 2021-12-10 RX ADMIN — SODIUM CHLORIDE, PRESERVATIVE FREE 10 ML: 5 INJECTION INTRAVENOUS at 08:41

## 2021-12-10 RX ADMIN — EPOETIN ALFA-EPBX 3000 UNITS: 3000 INJECTION, SOLUTION INTRAVENOUS; SUBCUTANEOUS at 08:41

## 2021-12-10 RX ADMIN — PANTOPRAZOLE SODIUM 40 MG: 40 INJECTION, POWDER, FOR SOLUTION INTRAVENOUS at 08:41

## 2021-12-10 RX ADMIN — LEVOTHYROXINE SODIUM 75 MCG: 0.07 TABLET ORAL at 06:36

## 2021-12-10 RX ADMIN — CLINDAMYCIN PHOSPHATE 600 MG: 600 INJECTION, SOLUTION INTRAVENOUS at 21:50

## 2021-12-10 RX ADMIN — HYDROMORPHONE HYDROCHLORIDE 1 MG: 1 INJECTION, SOLUTION INTRAMUSCULAR; INTRAVENOUS; SUBCUTANEOUS at 10:35

## 2021-12-10 RX ADMIN — METOCLOPRAMIDE HYDROCHLORIDE 5 MG: 5 INJECTION INTRAMUSCULAR; INTRAVENOUS at 06:37

## 2021-12-10 RX ADMIN — HEPARIN SODIUM 5000 UNITS: 10000 INJECTION INTRAVENOUS; SUBCUTANEOUS at 14:26

## 2021-12-10 RX ADMIN — CLINDAMYCIN PHOSPHATE 600 MG: 600 INJECTION, SOLUTION INTRAVENOUS at 14:25

## 2021-12-10 RX ADMIN — SODIUM CHLORIDE, PRESERVATIVE FREE 10 ML: 5 INJECTION INTRAVENOUS at 08:43

## 2021-12-10 RX ADMIN — HYDROMORPHONE HYDROCHLORIDE 1 MG: 1 INJECTION, SOLUTION INTRAMUSCULAR; INTRAVENOUS; SUBCUTANEOUS at 14:25

## 2021-12-10 RX ADMIN — DIPHENHYDRAMINE HYDROCHLORIDE 25 MG: 50 INJECTION, SOLUTION INTRAMUSCULAR; INTRAVENOUS at 08:52

## 2021-12-10 RX ADMIN — METOPROLOL TARTRATE 50 MG: 50 TABLET, FILM COATED ORAL at 21:50

## 2021-12-10 RX ADMIN — HEPARIN SODIUM 5000 UNITS: 10000 INJECTION INTRAVENOUS; SUBCUTANEOUS at 21:50

## 2021-12-10 RX ADMIN — METOCLOPRAMIDE HYDROCHLORIDE 5 MG: 5 INJECTION INTRAMUSCULAR; INTRAVENOUS at 21:50

## 2021-12-10 RX ADMIN — HYDROMORPHONE HYDROCHLORIDE 1 MG: 1 INJECTION, SOLUTION INTRAMUSCULAR; INTRAVENOUS; SUBCUTANEOUS at 18:07

## 2021-12-10 ASSESSMENT — PAIN DESCRIPTION - PAIN TYPE: TYPE: ACUTE PAIN

## 2021-12-10 ASSESSMENT — PAIN SCALES - GENERAL
PAINLEVEL_OUTOF10: 8
PAINLEVEL_OUTOF10: 0
PAINLEVEL_OUTOF10: 7
PAINLEVEL_OUTOF10: 8
PAINLEVEL_OUTOF10: 7
PAINLEVEL_OUTOF10: 8

## 2021-12-10 ASSESSMENT — PAIN DESCRIPTION - PROGRESSION: CLINICAL_PROGRESSION: GRADUALLY WORSENING

## 2021-12-10 ASSESSMENT — PAIN DESCRIPTION - LOCATION: LOCATION: ABDOMEN

## 2021-12-10 NOTE — CARE COORDINATION
Received call from [address] [place] [address] ([phone]) inquiring about discharge needs and update on condition. Upadated CM  Pt was started on TPN and Impatient HD T TH Sa while inpatient then to return to HD at home 5 days a week where her Mother does. Discharge plan is to return home when medically stable. MINISTERIO/MICHEL to follow for discharge needs.    Mari Jenkins, L.S.W.  [phone]

## 2021-12-10 NOTE — PROGRESS NOTES
Department of Internal Medicine  Nephrology Progress Note    Events Reviewed. Subjective: We are following Miss Jennifer Cuello for ESRD on Hemodialysis. She reports feeling better today and is now eating.      PHYSICAL EXAM:      Vitals:    VITALS:  BP 91/73   Pulse 87   Temp 97.5 °F (36.4 °C) (Oral)   Resp 14   Ht 5' 4\" (1.626 m)   Wt 128 lb 15.5 oz (58.5 kg)   SpO2 99%   BMI 22.14 kg/m²   24HR INTAKE/OUTPUT:      Intake/Output Summary (Last 24 hours) at 12/10/2021 1409  Last data filed at 12/9/2021 1643  Gross per 24 hour   Intake 558 ml   Output --   Net 558 ml       Access: RIJ tunneled dialysis catheter  Constitutional:  Awake, alert, oriented x3,   HEENT:  PERRL, normocephalic, atraumatic  Respiratory:  CTA bilateral   Cardiovascular/Edema:  ST, RRR, no murmur gallop or rub  Gastrointestinal:  abd distended, c/o persistent abdominal pain  Neurologic:  Awake and alert,, follows commands, no focal deficit  Skin:  Warm, dry, ecchymotic areas to abdomen  Other:      Scheduled Meds:   insulin glargine  10 Units SubCUTAneous BID    docusate sodium  100 mg Oral Daily    heparin (porcine)  5,000 Units SubCUTAneous Q8H    levofloxacin  500 mg IntraVENous Q48H    metoclopramide  5 mg IntraVENous 4x Daily AC & HS    pantoprazole  40 mg IntraVENous Daily    And    sodium chloride (PF)  10 mL IntraVENous Daily    epoetin nena-epbx  3,000 Units SubCUTAneous Once per day on Mon Wed Fri    sodium chloride flush  5-40 mL IntraVENous 2 times per day    clindamycin (CLEOCIN) IV  600 mg IntraVENous Q8H    insulin lispro  0-6 Units SubCUTAneous TID WC    insulin lispro  0-3 Units SubCUTAneous Nightly    [Held by provider] amLODIPine  5 mg Oral Daily    [Held by provider] bumetanide  2 mg Oral BID    levothyroxine  75 mcg Oral Daily    metoprolol tartrate  50 mg Oral BID    rOPINIRole  0.25 mg Oral Nightly    [Held by provider] sevelamer  800 mg Oral TID WC    vitamin D  50,000 Units Oral Weekly Continuous Infusions:   sodium chloride      dextrose       PRN Meds:.metoclopramide, diphenhydrAMINE, oxyCODONE, HYDROmorphone, promethazine, sodium chloride flush, sodium chloride, ondansetron **OR** ondansetron, polyethylene glycol, acetaminophen **OR** acetaminophen, glucose, dextrose, glucagon (rDNA), dextrose, sennosides-docusate sodium        DATA:    CBC with Differential:    Lab Results   Component Value Date    WBC 4.3 12/10/2021    RBC 2.86 12/10/2021    HGB 9.2 12/10/2021    HCT 26.6 12/10/2021     12/10/2021    MCV 93.0 12/10/2021    MCH 32.2 12/10/2021    MCHC 34.6 12/10/2021    RDW 17.4 12/10/2021    NRBC 0.9 08/10/2020    SEGSPCT 67 06/27/2013    METASPCT 1.7 08/10/2020    LYMPHOPCT 16.4 12/07/2021    MONOPCT 7.2 12/07/2021    BASOPCT 1.1 12/07/2021    MONOSABS 0.46 12/07/2021    LYMPHSABS 1.05 12/07/2021    EOSABS 0.14 12/07/2021    BASOSABS 0.07 12/07/2021     CMP:    Lab Results   Component Value Date     12/10/2021    K 4.7 12/10/2021    K 4.2 11/24/2021     12/10/2021    CO2 20 12/10/2021    BUN 6 12/10/2021    CREATININE 3.3 12/10/2021    GFRAA 20 12/10/2021    LABGLOM 20 12/10/2021    GLUCOSE 51 12/10/2021    GLUCOSE 130 05/18/2012    PROT 6.1 12/07/2021    LABALBU 3.1 12/07/2021    LABALBU 4.1 05/18/2012    CALCIUM 7.8 12/10/2021    BILITOT <0.2 12/07/2021    ALKPHOS 129 12/07/2021    AST 5 12/07/2021    ALT <5 12/07/2021     Magnesium:    Lab Results   Component Value Date    MG 3.9 12/10/2021     Phosphorus:    Lab Results   Component Value Date    PHOS 1.9 12/10/2021     Radiology Review:    CT Abdomen and Pelvis - December 7, 2021   Severe ascites is the predominant finding in the abdomen and pelvis. Chest X-Ray December 7, 2021   Improving atelectasis versus infiltrate in lower lungs.          BRIEF SUMMARY OF INITIAL CONSULT: Briefly, Miss Porsche Mariscal is a 36year-old female with a PMH of ESRD on home hemodialysis 5 days/wk, (initiated September 2021, Type I DM, poorly controlled with recurrent admissions for DKA, HTN, gastroparesis, ascites, infective endocarditis, cardiac arrest, hypothyroidism and depression. She was [facility] to [facility] on December 7, 2021, after presenting to the ED with complaints of chest pain, SOB, nausea and vomiting x 1 week, weakness and decreased appetite. She denies missing any hemodialysis treatments. We are consulted for dialysis management. Problems resolved:  Hypokalemia, multifactorial - 2/2 GI losses from vomiting, diarrhea and poor oral intake in the setting of loop diuretic administration (takes Bumex at home). Improved. IMPRESSION/RECOMMENDATIONS:      1. ESRD on home hemodialysis 5 days/wk via RIJ tunneled dialysis catheter. HD initiated September 2021 after failed peritoneal dialysis with persistent hyperkalemia. For dialysis three times/wk TU,TH,Sat while inpatient. Seen on dialysis, tolerating well. May need daily dialysis for possible nephrogenic ascites. 2. Hypotonic hyponatremia, secondary to lack of water excretion (ESRD) in the setting of IV fluid administration with PPN. IV fluids were to be stopped yesterday when the PPN was initiated and were not. Since she is now eating we will stop both. 3. HTN, on metoprolol; amlodipine on hold due to hypotension   4. Vitamin D deficiency, on ergocalciferol   5. MBD of CKD, on sevelamer at home, currently on hold   6. Anemia of CKD,  on epoetin alpha 3,000 unit 3 times/wk  7. Ascites, etiology? Possibly nephrogenic ascites , s/p paracentesis. GI following. We may need daily dialysis   -------------------------------------------------------------------------------  8. Type I DM, poorly controlled with frequent readmissions for DKA, we will consult endocrinology   9. Restless leg syndrome, on ropinirole at home  10. Depression, on Abilify  11. Hypothyroidism, on levothyroxine   12.  History of gastroparesis, on metoclopramide        PLAN:    · Continue HD TU-TH-Sat while

## 2021-12-10 NOTE — PROGRESS NOTES
Hospitalist Progress Note      PCP: Prateek Nichols MD    Date of Admission: 2021    Chief Complaint: Abd pain, nausea, vomiting     Hospital Course: Ms. Shanna Pascal, a 36y.o. year old female  who  has a past medical history of Acute congestive heart failure ([address] BC (acute kidney injury) ([address] Cardiac arrest ([address] Cephalgia, Chronic kidney disease, Depression, Diabetes mellitus ([address] Diabetic gastroparesis associated with type 1 diabetes mellitus ([address] Diabetic ketoacidosis ([address] Diabetic ketoacidosis with coma associated with type 1 diabetes mellitus ([address] Diabetic polyneuropathy associated with type 1 diabetes mellitus ([address] Drug use complicating pregnancy in third trimester, Endocarditis, ESRD (end stage renal disease) ([address] H/O cardiovascular stress test, Hemodialysis patient ([address] History of blood transfusion, Hyperlipidemia, Hyperosmolar hyperglycemic state (HHS) ([address] Hypothyroidism, Iron deficiency anemia, MDRO (multiple drug resistant organisms) resistance, MRSA (methicillin resistant Staphylococcus aureus), Non compliance w medication regimen, Other disorders of kidney and ureter, Pregnancy, Previous  delivery affecting pregnancy, antepartum, Previous stillbirth or demise, antepartum, Seizure ([address] Severe pre-eclampsia in third trimester, Shock liver, and Valvular endocarditis.      Patient came to our service with abdominal pain and distention started for about 1 week associated with nausea vomiting. CT of the abdomen show ascites severe patient denies history of ascites previously. She stopped using peritoneal dialysis about 1 month ago. Patient saying that for 1 week her abdomen become bigger and distended pain develop for the last 24-48 hrs. Subjective: Patient feeling better but she did not have bowel movement for 5 days and do not want to take laxative will order stool softener.   PPN was DC per nephrology     medications:  Reviewed    Infusion Medications    dextrose      sodium chloride       Scheduled Medications    insulin glargine  10 Units SubCUTAneous BID    docusate sodium  100 mg Oral Daily    heparin (porcine)  5,000 Units SubCUTAneous Q8H    levofloxacin  500 mg IntraVENous Q48H    metoclopramide  5 mg IntraVENous 4x Daily AC & HS    pantoprazole  40 mg IntraVENous Daily    And    sodium chloride (PF)  10 mL IntraVENous Daily    epoetin nena-epbx  3,000 Units SubCUTAneous Once per day on Mon Wed Fri    sodium chloride flush  5-40 mL IntraVENous 2 times per day    clindamycin (CLEOCIN) IV  600 mg IntraVENous Q8H    insulin lispro  0-6 Units SubCUTAneous TID     insulin lispro  0-3 Units SubCUTAneous Nightly    [Held by provider] amLODIPine  5 mg Oral Daily    [Held by provider] bumetanide  2 mg Oral BID    levothyroxine  75 mcg Oral Daily    metoprolol tartrate  50 mg Oral BID    rOPINIRole  0.25 mg Oral Nightly    [Held by provider] sevelamer  800 mg Oral TID     vitamin D  50,000 Units Oral Weekly     PRN Meds: glucose, dextrose, glucagon (rDNA), dextrose, metoclopramide, diphenhydrAMINE, oxyCODONE, HYDROmorphone, promethazine, sodium chloride flush, sodium chloride, ondansetron **OR** ondansetron, polyethylene glycol, acetaminophen **OR** acetaminophen, sennosides-docusate sodium      Intake/Output Summary (Last 24 hours) at 12/10/2021 1425  Last data filed at 12/9/2021 1643  Gross per 24 hour   Intake 558 ml   Output --   Net 558 ml       Exam:    BP 91/73   Pulse 87   Temp 97.5 °F (36.4 °C) (Oral)   Resp 14   Ht 5' 4\" (1.626 m)   Wt 128 lb 15.5 oz (58.5 kg)   SpO2 99%   BMI 22.14 kg/m²     General appearance: No apparent distress, appears stated age and cooperative. HEENT: Pupils equal, round, and reactive to light. Conjunctivae/corneas clear. Neck: Supple, with full range of motion. No jugular venous distention. Trachea midline. Respiratory:  Normal respiratory effort.  Clear to auscultation, bilaterally without Rales/Wheezes/Rhonchi. Cardiovascular: Regular rate and rhythm with normal S1/S2 without murmurs, rubs or gallops. Abdomen: Soft, non-tender, non-distended with normal bowel sounds. Musculoskeletal: No clubbing, cyanosis or edema bilaterally. Full range of motion without deformity. Skin: Skin color, texture, turgor normal.  No rashes or lesions. Neurologic:  Neurovascularly intact without any focal sensory/motor deficits. Cranial nerves: II-XII intact, grossly non-focal.  Psychiatric: Alert and oriented, thought content appropriate, normal insight    Labs:   Recent Labs     12/08/21  0555 12/09/21 0458 12/10/21  0615   WBC 4.0* 8.2 4.3*   HGB 10.7* 10.7* 9.2*   HCT 33.9* 34.4 26.6*   * 234 176     Recent Labs     12/08/21  0555 12/08/21  0555 12/09/21 0458 12/10/21  0615 12/10/21  0928      < > 133 127* 131*   K 3.3*   < > 3.9 3.0* 4.7   CL 95*   < > 98 90* 102   CO2 24   < > 21* 20* 20*   BUN 14   < > 16 6 6   CREATININE 5.1*   < > 6.2* 2.9* 3.3*   CALCIUM 8.8   < > 7.9* 7.7* 7.8*   PHOS 3.4  --  3.7 1.9*  --     < > = values in this interval not displayed. No results for input(s): AST, ALT, BILIDIR, BILITOT, ALKPHOS in the last 72 hours. Recent Labs     12/08/21  0817   INR 1.1     No results for input(s): Ethelene [place] in the last 72 hours.     Assessment/Plan:    Active Hospital Problems    Diagnosis Date Noted    Ascites [R18.8] 12/07/2021     Abdominal pain  Secondary to gastroparesis and new onset of ascites  Rule out SBP-s/p paracentesis CT of the abdomen shows severe ascites  consult GI dated  Empirically started on IV Levaquin and Clinda  Patient has multiple allergies to IV antibiotics  PPN was DC  per nephrology    New onset of ascites  Patient previously had shock liver in the third trimester with severe preeclampsia   will need paracentesis  GI consulted and follow-up     End-stage kidney disease  S/p hemodialysis today  On hemodialysis per nephrology  Nephrology consulted and follow-up  Patient used to have peritoneal dialysis daily  Stopped peritoneal dialysis 1 month ago     Hypokalemia/hypomagnesemia  Secondary to GI loss  Replaced     Anemia  Secondary to anemia of chronic disease  Continue monitor H&H  Transfuse if needed     Diabetes  Poor control with hypoglycemia  Started on diabetic diet PPN was discontinued  Treatment of hypoglycemia  Continue insulin sliding scales    DVT Prophylaxis: heparin   Diet: ADULT ORAL NUTRITION SUPPLEMENT; Lunch, Dinner; Other Oral Supplement; Gelatein 20  ADULT DIET;  Regular  Code Status: Full Code    PT/OT Eval Status: NA     Dispo - home when medically stable     Ty Gaytan MD

## 2021-12-11 LAB
ANION GAP SERPL CALCULATED.3IONS-SCNC: 10 MMOL/L (ref 7–16)
BUN BLDV-MCNC: 9 MG/DL (ref 6–20)
CALCIUM SERPL-MCNC: 7.8 MG/DL (ref 8.6–10.2)
CHLORIDE BLD-SCNC: 100 MMOL/L (ref 98–107)
CO2: 23 MMOL/L (ref 22–29)
CREAT SERPL-MCNC: 4.6 MG/DL (ref 0.5–1)
GFR AFRICAN AMERICAN: 14
GFR NON-AFRICAN AMERICAN: 14 ML/MIN/1.73
GLUCOSE BLD-MCNC: 62 MG/DL (ref 74–99)
HCT VFR BLD CALC: 29.4 % (ref 34–48)
HEMOGLOBIN: 8.9 G/DL (ref 11.5–15.5)
MAGNESIUM: 2 MG/DL (ref 1.6–2.6)
MCH RBC QN AUTO: 27.7 PG (ref 26–35)
MCHC RBC AUTO-ENTMCNC: 30.3 % (ref 32–34.5)
MCV RBC AUTO: 91.6 FL (ref 80–99.9)
METER GLUCOSE: 123 MG/DL (ref 74–99)
METER GLUCOSE: 126 MG/DL (ref 74–99)
METER GLUCOSE: 178 MG/DL (ref 74–99)
METER GLUCOSE: 61 MG/DL (ref 74–99)
METER GLUCOSE: 73 MG/DL (ref 74–99)
METER GLUCOSE: 94 MG/DL (ref 74–99)
METER GLUCOSE: 96 MG/DL (ref 74–99)
PDW BLD-RTO: 17.5 FL (ref 11.5–15)
PHOSPHORUS: 2.8 MG/DL (ref 2.5–4.5)
PLATELET # BLD: 258 E9/L (ref 130–450)
PMV BLD AUTO: 10.6 FL (ref 7–12)
POTASSIUM SERPL-SCNC: 3.9 MMOL/L (ref 3.5–5)
RBC # BLD: 3.21 E12/L (ref 3.5–5.5)
SODIUM BLD-SCNC: 133 MMOL/L (ref 132–146)
WBC # BLD: 5.3 E9/L (ref 4.5–11.5)

## 2021-12-11 PROCEDURE — 1200000000 HC SEMI PRIVATE

## 2021-12-11 PROCEDURE — 6360000002 HC RX W HCPCS: Performed by: FAMILY MEDICINE

## 2021-12-11 PROCEDURE — 80048 BASIC METABOLIC PNL TOTAL CA: CPT

## 2021-12-11 PROCEDURE — 99232 SBSQ HOSP IP/OBS MODERATE 35: CPT | Performed by: STUDENT IN AN ORGANIZED HEALTH CARE EDUCATION/TRAINING PROGRAM

## 2021-12-11 PROCEDURE — 2500000003 HC RX 250 WO HCPCS: Performed by: INTERNAL MEDICINE

## 2021-12-11 PROCEDURE — 6370000000 HC RX 637 (ALT 250 FOR IP): Performed by: INTERNAL MEDICINE

## 2021-12-11 PROCEDURE — 2580000003 HC RX 258: Performed by: INTERNAL MEDICINE

## 2021-12-11 PROCEDURE — 84100 ASSAY OF PHOSPHORUS: CPT

## 2021-12-11 PROCEDURE — 82962 GLUCOSE BLOOD TEST: CPT

## 2021-12-11 PROCEDURE — 99254 IP/OBS CNSLTJ NEW/EST MOD 60: CPT | Performed by: INTERNAL MEDICINE

## 2021-12-11 PROCEDURE — 6360000002 HC RX W HCPCS: Performed by: NURSE PRACTITIONER

## 2021-12-11 PROCEDURE — C9113 INJ PANTOPRAZOLE SODIUM, VIA: HCPCS | Performed by: NURSE PRACTITIONER

## 2021-12-11 PROCEDURE — 83735 ASSAY OF MAGNESIUM: CPT

## 2021-12-11 PROCEDURE — 36415 COLL VENOUS BLD VENIPUNCTURE: CPT

## 2021-12-11 PROCEDURE — 6360000002 HC RX W HCPCS: Performed by: INTERNAL MEDICINE

## 2021-12-11 PROCEDURE — 85027 COMPLETE CBC AUTOMATED: CPT

## 2021-12-11 PROCEDURE — 90935 HEMODIALYSIS ONE EVALUATION: CPT | Performed by: INTERNAL MEDICINE

## 2021-12-11 RX ORDER — ALBUMIN, HUMAN INJ 5% 5 %
25 SOLUTION INTRAVENOUS ONCE
Status: COMPLETED | OUTPATIENT
Start: 2021-12-11 | End: 2021-12-12

## 2021-12-11 RX ORDER — INSULIN GLARGINE 100 [IU]/ML
12 INJECTION, SOLUTION SUBCUTANEOUS NIGHTLY
Status: DISCONTINUED | OUTPATIENT
Start: 2021-12-11 | End: 2021-12-12 | Stop reason: HOSPADM

## 2021-12-11 RX ORDER — LOPERAMIDE HYDROCHLORIDE 2 MG/1
2 CAPSULE ORAL 4 TIMES DAILY PRN
Status: DISCONTINUED | OUTPATIENT
Start: 2021-12-11 | End: 2021-12-12 | Stop reason: HOSPADM

## 2021-12-11 RX ORDER — 0.9 % SODIUM CHLORIDE 0.9 %
500 INTRAVENOUS SOLUTION INTRAVENOUS ONCE
Status: COMPLETED | OUTPATIENT
Start: 2021-12-11 | End: 2021-12-12

## 2021-12-11 RX ADMIN — PANTOPRAZOLE SODIUM 40 MG: 40 INJECTION, POWDER, FOR SOLUTION INTRAVENOUS at 11:13

## 2021-12-11 RX ADMIN — SODIUM CHLORIDE, PRESERVATIVE FREE 10 ML: 5 INJECTION INTRAVENOUS at 21:19

## 2021-12-11 RX ADMIN — INSULIN LISPRO 1 UNITS: 100 INJECTION, SOLUTION INTRAVENOUS; SUBCUTANEOUS at 18:42

## 2021-12-11 RX ADMIN — HYDROMORPHONE HYDROCHLORIDE 1 MG: 1 INJECTION, SOLUTION INTRAMUSCULAR; INTRAVENOUS; SUBCUTANEOUS at 11:22

## 2021-12-11 RX ADMIN — CLINDAMYCIN PHOSPHATE 600 MG: 600 INJECTION, SOLUTION INTRAVENOUS at 14:26

## 2021-12-11 RX ADMIN — METOPROLOL TARTRATE 50 MG: 50 TABLET, FILM COATED ORAL at 11:13

## 2021-12-11 RX ADMIN — ROPINIROLE HYDROCHLORIDE 0.25 MG: 0.25 TABLET, FILM COATED ORAL at 21:13

## 2021-12-11 RX ADMIN — HYDROMORPHONE HYDROCHLORIDE 1 MG: 1 INJECTION, SOLUTION INTRAMUSCULAR; INTRAVENOUS; SUBCUTANEOUS at 14:25

## 2021-12-11 RX ADMIN — HEPARIN SODIUM 5000 UNITS: 10000 INJECTION INTRAVENOUS; SUBCUTANEOUS at 14:26

## 2021-12-11 RX ADMIN — CLINDAMYCIN PHOSPHATE 600 MG: 600 INJECTION, SOLUTION INTRAVENOUS at 21:05

## 2021-12-11 RX ADMIN — HEPARIN SODIUM 5000 UNITS: 10000 INJECTION INTRAVENOUS; SUBCUTANEOUS at 05:00

## 2021-12-11 RX ADMIN — METOCLOPRAMIDE HYDROCHLORIDE 5 MG: 5 INJECTION INTRAMUSCULAR; INTRAVENOUS at 18:43

## 2021-12-11 RX ADMIN — INSULIN GLARGINE 12 UNITS: 100 INJECTION, SOLUTION SUBCUTANEOUS at 21:19

## 2021-12-11 RX ADMIN — HEPARIN SODIUM 5000 UNITS: 10000 INJECTION INTRAVENOUS; SUBCUTANEOUS at 21:11

## 2021-12-11 RX ADMIN — METOPROLOL TARTRATE 50 MG: 50 TABLET, FILM COATED ORAL at 21:11

## 2021-12-11 RX ADMIN — SODIUM CHLORIDE 500 ML: 9 INJECTION, SOLUTION INTRAVENOUS at 22:53

## 2021-12-11 RX ADMIN — HYDROMORPHONE HYDROCHLORIDE 1 MG: 1 INJECTION, SOLUTION INTRAMUSCULAR; INTRAVENOUS; SUBCUTANEOUS at 17:59

## 2021-12-11 RX ADMIN — CLINDAMYCIN PHOSPHATE 600 MG: 600 INJECTION, SOLUTION INTRAVENOUS at 03:00

## 2021-12-11 RX ADMIN — METOCLOPRAMIDE HYDROCHLORIDE 5 MG: 5 INJECTION INTRAMUSCULAR; INTRAVENOUS at 11:13

## 2021-12-11 RX ADMIN — HYDROMORPHONE HYDROCHLORIDE 1 MG: 1 INJECTION, SOLUTION INTRAMUSCULAR; INTRAVENOUS; SUBCUTANEOUS at 21:05

## 2021-12-11 RX ADMIN — LEVOFLOXACIN 500 MG: 5 INJECTION, SOLUTION INTRAVENOUS at 22:54

## 2021-12-11 RX ADMIN — HYDROMORPHONE HYDROCHLORIDE 1 MG: 1 INJECTION, SOLUTION INTRAMUSCULAR; INTRAVENOUS; SUBCUTANEOUS at 05:06

## 2021-12-11 RX ADMIN — DIPHENHYDRAMINE HYDROCHLORIDE 25 MG: 50 INJECTION, SOLUTION INTRAMUSCULAR; INTRAVENOUS at 17:59

## 2021-12-11 RX ADMIN — SODIUM CHLORIDE, PRESERVATIVE FREE 10 ML: 5 INJECTION INTRAVENOUS at 11:31

## 2021-12-11 RX ADMIN — SODIUM CHLORIDE, PRESERVATIVE FREE 10 ML: 5 INJECTION INTRAVENOUS at 03:00

## 2021-12-11 RX ADMIN — METOCLOPRAMIDE HYDROCHLORIDE 5 MG: 5 INJECTION INTRAMUSCULAR; INTRAVENOUS at 05:00

## 2021-12-11 ASSESSMENT — PAIN DESCRIPTION - LOCATION
LOCATION: ABDOMEN
LOCATION: ABDOMEN

## 2021-12-11 ASSESSMENT — PAIN DESCRIPTION - ORIENTATION: ORIENTATION: LEFT;LOWER

## 2021-12-11 ASSESSMENT — PAIN SCALES - GENERAL
PAINLEVEL_OUTOF10: 8
PAINLEVEL_OUTOF10: 0
PAINLEVEL_OUTOF10: 8
PAINLEVEL_OUTOF10: 8
PAINLEVEL_OUTOF10: 0
PAINLEVEL_OUTOF10: 8
PAINLEVEL_OUTOF10: 7
PAINLEVEL_OUTOF10: 7

## 2021-12-11 ASSESSMENT — PAIN DESCRIPTION - PAIN TYPE
TYPE: ACUTE PAIN
TYPE: ACUTE PAIN

## 2021-12-11 ASSESSMENT — PAIN DESCRIPTION - ONSET: ONSET: AWAKENED FROM SLEEP

## 2021-12-11 ASSESSMENT — PAIN DESCRIPTION - FREQUENCY: FREQUENCY: INTERMITTENT

## 2021-12-11 ASSESSMENT — PAIN DESCRIPTION - DESCRIPTORS: DESCRIPTORS: ACHING;DISCOMFORT;SORE

## 2021-12-11 NOTE — PROGRESS NOTES
Hospitalist Progress Note      PCP: Carmen Rubio MD    Date of Admission: 2021    Chief Complaint: Abd pain, nausea, vomiting     Hospital Course: Ms. Silvia Winslow, a 36y.o. year old female  who  has a past medical history of Acute congestive heart failure ([address] BC (acute kidney injury) ([address] Cardiac arrest ([address] Cephalgia, Chronic kidney disease, Depression, Diabetes mellitus ([address] Diabetic gastroparesis associated with type 1 diabetes mellitus ([address] Diabetic ketoacidosis ([address] Diabetic ketoacidosis with coma associated with type 1 diabetes mellitus ([address] Diabetic polyneuropathy associated with type 1 diabetes mellitus ([address] Drug use complicating pregnancy in third trimester, Endocarditis, ESRD (end stage renal disease) ([address] H/O cardiovascular stress test, Hemodialysis patient ([address] History of blood transfusion, Hyperlipidemia, Hyperosmolar hyperglycemic state (HHS) ([address] Hypothyroidism, Iron deficiency anemia, MDRO (multiple drug resistant organisms) resistance, MRSA (methicillin resistant Staphylococcus aureus), Non compliance w medication regimen, Other disorders of kidney and ureter, Pregnancy, Previous  delivery affecting pregnancy, antepartum, Previous stillbirth or demise, antepartum, Seizure ([address] Severe pre-eclampsia in third trimester, Shock liver, and Valvular endocarditis.      Patient came to our service with abdominal pain and distention started for about 1 week associated with nausea vomiting. CT of the abdomen show ascites severe patient denies history of ascites previously. She stopped using peritoneal dialysis about 1 month ago. Patient saying that for 1 week her abdomen become bigger and distended pain develop for the last 24-48 hrs. Subjective: Patient feeling better, abdominal pain but able to tolerate diet   Pt denies other complaint.  No new event overnight       medications:  Reviewed    Infusion Medications    dextrose      sodium chloride Scheduled Medications    insulin glargine  12 Units SubCUTAneous Nightly    docusate sodium  100 mg Oral Daily    heparin (porcine)  5,000 Units SubCUTAneous Q8H    levofloxacin  500 mg IntraVENous Q48H    metoclopramide  5 mg IntraVENous 4x Daily AC & HS    pantoprazole  40 mg IntraVENous Daily    And    sodium chloride (PF)  10 mL IntraVENous Daily    epoetin nena-epbx  3,000 Units SubCUTAneous Once per day on Mon Wed Fri    sodium chloride flush  5-40 mL IntraVENous 2 times per day    clindamycin (CLEOCIN) IV  600 mg IntraVENous Q8H    insulin lispro  0-6 Units SubCUTAneous TID     insulin lispro  0-3 Units SubCUTAneous Nightly    [Held by provider] amLODIPine  5 mg Oral Daily    [Held by provider] bumetanide  2 mg Oral BID    levothyroxine  75 mcg Oral Daily    metoprolol tartrate  50 mg Oral BID    rOPINIRole  0.25 mg Oral Nightly    [Held by provider] sevelamer  800 mg Oral TID     vitamin D  50,000 Units Oral Weekly     PRN Meds: glucose, dextrose, glucagon (rDNA), dextrose, metoclopramide, diphenhydrAMINE, oxyCODONE, HYDROmorphone, promethazine, sodium chloride flush, sodium chloride, ondansetron **OR** ondansetron, polyethylene glycol, acetaminophen **OR** acetaminophen, sennosides-docusate sodium      Intake/Output Summary (Last 24 hours) at 12/11/2021 1553  Last data filed at 12/11/2021 1000  Gross per 24 hour   Intake 600 ml   Output 500 ml   Net 100 ml       Exam:    /81   Pulse 113   Temp 97.9 °F (36.6 °C) (Oral)   Resp 18   Ht 5' 4\" (1.626 m)   Wt 135 lb 9.3 oz (61.5 kg)   SpO2 98%   BMI 23.27 kg/m²     General appearance: No apparent distress, appears stated age and cooperative. HEENT: Pupils equal, round, and reactive to light. Conjunctivae/corneas clear. Neck: Supple, with full range of motion. No jugular venous distention. Trachea midline. Respiratory:  Normal respiratory effort.  Clear to auscultation, bilaterally without Rales/Wheezes/Rhonchi. Cardiovascular: Regular rate and rhythm with normal S1/S2 without murmurs, rubs or gallops. Abdomen: Soft, non-tender, non-distended with normal bowel sounds. Musculoskeletal: No clubbing, cyanosis or edema bilaterally. Full range of motion without deformity. Skin: Skin color, texture, turgor normal.  No rashes or lesions. Neurologic:  Neurovascularly intact without any focal sensory/motor deficits. Cranial nerves: II-XII intact, grossly non-focal.  Psychiatric: Alert and oriented, thought content appropriate, normal insight    Labs:   Recent Labs     12/09/21  0458 12/10/21  0615 12/11/21  0553   WBC 8.2 4.3* 5.3   HGB 10.7* 9.2* 8.9*   HCT 34.4 26.6* 29.4*    176 258     Recent Labs     12/09/21  0458 12/09/21  0458 12/10/21  0615 12/10/21  0928 12/11/21  0553      < > 127* 131* 133   K 3.9   < > 3.0* 4.7 3.9   CL 98   < > 90* 102 100   CO2 21*   < > 20* 20* 23   BUN 16   < > 6 6 9   CREATININE 6.2*   < > 2.9* 3.3* 4.6*   CALCIUM 7.9*   < > 7.7* 7.8* 7.8*   PHOS 3.7  --  1.9*  --  2.8    < > = values in this interval not displayed. No results for input(s): AST, ALT, BILIDIR, BILITOT, ALKPHOS in the last 72 hours. No results for input(s): INR in the last 72 hours. No results for input(s): [place] in the last 72 hours.     Assessment/Plan:    Active Hospital Problems    Diagnosis Date Noted    Ascites [R18.8] 12/07/2021     Abdominal pain  Secondary to gastroparesis and new onset of ascites  Rule out SBP-s/p paracentesis CT of the abdomen shows severe ascites  consult GI dated  Empirically started on IV Levaquin and Clinda  Patient has multiple allergies to IV antibiotics  PPN was DC  per nephrology  Patient continue on a diet which she is tolerating    New onset of ascites  Patient previously had shock liver in the third trimester with severe preeclampsia   Status post paracentesis  GI consulted and follow-up     End-stage kidney disease  S/p hemodialysis On hemodialysis per nephrology  Nephrology consulted and follow-up  Patient used to have peritoneal dialysis daily  Stopped peritoneal dialysis 1 month ago     Hypokalemia/hypomagnesemia  Secondary to GI loss  Replaced     Anemia  Secondary to anemia of chronic disease  Continue monitor H&H  Transfuse if needed     Diabetes  Poor control with hypoglycemia  Started on diabetic diet PPN was discontinued  Treatment of hypoglycemia  Continue insulin sliding scales    DVT Prophylaxis: heparin   Diet: ADULT ORAL NUTRITION SUPPLEMENT; Lunch, Dinner; Other Oral Supplement; Gelatein 20  ADULT DIET;  Regular  Code Status: Full Code    PT/OT Eval Status: NA     Dispo - home when medically stable per nephrology clearance    Shad Toro MD

## 2021-12-11 NOTE — FLOWSHEET NOTE
12/11/21 1000   Vital Signs   /70   Temp 98.6 °F (37 °C)   Pulse 79   Resp 18   Weight 135 lb 9.3 oz (61.5 kg)   Weight Method Bed scale   Percent Weight Change -3.15   Pain Assessment   Pain Assessment 0-10   Pain Level 0   Post-Hemodialysis Assessment   Post-Treatment Procedures Blood returned; Catheter capped, clamped and heparinized x 2 ports   Machine Disinfection Process Exterior Machine Disinfection   Rinseback Volume (ml) 300 ml   Total Liters Processed (l/min) 80.2 l/min   Dialyzer Clearance Lightly streaked   Duration of Treatment (minutes) 210 minutes   Heparin amount administered during treatment (units) 0 units   Hemodialysis Intake (ml) 300 ml   Hemodialysis Output (ml) 500 ml   NET Removed (ml) 200 ml   Tolerated Treatment Fair   Patient Response to Treatment transient hypotension resolved with uf off, 200cc fluid removal, profile c   Bilateral Breath Sounds Diminished   Edema None   Physician Notified?  No

## 2021-12-11 NOTE — CONSULTS
ENDOCRINOLOGY INITIAL CONSULTATION NOTE      Date of admission: 12/7/2021  Date of service: 12/11/2021  Admitting physician: Migel Mike MD   Primary Care Physician: Yaw Aguila MD  Consultant physician: Debra Lester MD     Reason for the consultation:  Uncontrolled DM,labile BG    History of Present Illness: The history is provided by the patient. Accuracy of the patient data is excellent    [address] Maldonado is a very pleasant 36 y.o. old female with PMH of Type I DM, ESRD on PD,HTN,hypothyroidism. Infective endocarditis and other listed below admitted to [facility] on 12/7/2021 because of abdominal pain. Endocrine service was consulted for DM management. The patient was in her usual state of health until around thanksgiving when started c/o abdominal pain and bloating associated with N/V. She denies fever, chills, cough or SOB    CT abd/pelvis showed severe ascites. She underwent IR guided paracentesis and drained 4L dark yellow color fluid     Prior to admission  The patient was diagnosed with type 1DM at the age 17. Prior to admission patient was on lantus 10 units BID, Humalog per slisding scale. She was on insuin pump in the past. She stopped using insulin pump almost a year ago after lost her CGM. Patient has not been eating consistent carbohydrate meals, self-blood glucose monitoring has been above goal prior to admission.  Her diabetes complicated with + Retinopathy, + ESRD on dialysis, + Neuropathy   Lab Results   Component Value Date    LABA1C 8.7 12/08/2021     Inpatient diet:   Renal/Carb Restricted diet     Point of care glucose monitoring (Independently reviewed)   Recent Labs     12/10/21  1440 12/10/21  1731 12/10/21  2148 12/11/21  0303 12/11/21  0509 12/11/21  0614 12/11/21  1109 12/11/21  1303   GLUMET 94 119* 105* 73* 61* 96 126* 123*       Past medical history:   Past Medical History:   Diagnosis Date    Acute congestive heart failure ([facility]     BC (acute kidney injury) ([facility] 10/01/2019    Cardiac arrest ([address] 02/15/2021    Cephalgia 10/09/2019    Chronic kidney disease     Depression     Diabetes mellitus ([address]     Diabetic gastroparesis associated with type 1 diabetes mellitus ([address] 2018    Diabetic ketoacidosis ([address] 2011    Diabetic ketoacidosis with coma associated with type 1 diabetes mellitus ([address] 2013    Diabetic polyneuropathy associated with type 1 diabetes mellitus ([address] 2020    Drug use complicating pregnancy in third trimester     Endocarditis 10/31/2020    ESRD (end stage renal disease) ([address] 2020    H/O cardiovascular stress test 2021    Lexiscan    Hemodialysis patient [facility])     History of blood transfusion 2019    Hyperlipidemia 10/08/2020    Hyperosmolar hyperglycemic state (HHS) ([address] 2020    Hypothyroidism 10/08/2020    Iron deficiency anemia 10/01/2019    MDRO (multiple drug resistant organisms) resistance     MRSA (methicillin resistant Staphylococcus aureus)     back wound abcess    Non compliance w medication regimen 2016    Other disorders of kidney and ureter     Pregnancy 2016    16 weeks    Previous  delivery affecting pregnancy, antepartum 2017    Previous stillbirth or demise, antepartum 2016    Seizure ([address] 2020    Severe pre-eclampsia in third trimester 2016    Shock liver 02/15/2021    Valvular endocarditis 11/10/2020    This Diagnosis was added to the Problem List based on transcribed orders from Dr. Jose A Caruso          Past surgical history:  Past Surgical History:   Procedure Laterality Date    BACK SURGERY      abscess    CATHETER REMOVAL N/A 10/1/2021    PERITONEAL CATHETER REMOVAL performed by Opal Chavez MD at [facility]      x2   [facility], LAPAROSCOPIC N/A 2019    CHOLECYSTECTOMY LAPAROSCOPIC performed by Kristine Combs MD at [address] [place] [address] N/A 2018    COLONOSCOPY WITH BIOPSY performed by Kye Malik MD at [address] [place] [address] COLONOSCOPY N/A 12/18/2018    COLONOSCOPY WITH BIOPSY performed by Yogi Vickers MD at [address] [place] [address] ECHO COMPL W [address]  1/31/2012    EF 57%    ECHO COMPL W DOP COLOR FLOW  6/10/2013         EMBOLECTOMY N/A 11/6/2020    [address] [place] [address], [address] [facility], YULISSA -- REQS ROOM 3 performed by Ruthie French MD at [address] [place] [address] Left 2/23/2021    LEFT LEG INCISION AND DRAINAGE, DEBRIDEMENT, WOUND VAC APPLICATION performed by Adan Way DPM at [address] [place] [address] Left 5/18/2021    LEFT LEG DEBRIDEMENT BIOPSY POSS APPLICATION WOUND VAC performed by Adan Way DPM at [place] [address] N/A 6/26/2020    LAPAROSCOPIC INSERTION PERITONEAL DIALYSIS CATHETER performed by Tamie Vazquez MD at [facility] ESOPHAGOGASTRODUODENOSCOPY TRANSORAL DIAGNOSTIC N/A 5/30/2018    EGD ESOPHAGOGASTRODUODENOSCOPY performed by Kye Malik MD at [address] [place] [address] TRANSESOPHAGEAL ECHOCARDIOGRAM N/A 10/19/2020    TRANSESOPHAGEAL ECHOCARDIOGRAM WITH BUBBLE STUDY performed by Mendel Malik MD at [address] [place] [address] TUNNELED VENOUS PORT PLACEMENT  04/2018    UPPER GASTROINTESTINAL ENDOSCOPY  12/18/2018    EGD BIOPSY performed by Yogi Vickers MD at [facility] [facility] 10/11/2019    EGD ESOPHAGOGASTRODUODENOSCOPY performed by Dominick Cm DO at [address] [place] [address] [address] N/A 7/14/2021    EGD BIOPSY performed by Charla Berry MD at [address] history:   Tobacco:   reports that she quit smoking about 10 months ago. Her smoking use included cigarettes. She has a 3.00 pack-year smoking history. She has never used smokeless tobacco.  Alcohol:   reports no history of alcohol use. Drugs:   reports current drug use. Drug: Opiates .     Family history:    Family History   Problem Relation Age of Onset    Asthma Mother     Hypertension Mother     High Blood Pressure Mother     Diabetes Mother     Asthma Brother     High Blood Pressure Father        Allergy and drug reactions: Allergies   Allergen Reactions    Cefepime Other (See Comments)     \" neurological side effect- slurred speech and confusion    Toradol [Ketorolac Tromethamine] Anaphylaxis and Hives       Scheduled Meds:   insulin glargine  12 Units SubCUTAneous Nightly    docusate sodium  100 mg Oral Daily    heparin (porcine)  5,000 Units SubCUTAneous Q8H    levofloxacin  500 mg IntraVENous Q48H    metoclopramide  5 mg IntraVENous 4x Daily AC & HS    pantoprazole  40 mg IntraVENous Daily    And    sodium chloride (PF)  10 mL IntraVENous Daily    epoetin nena-epbx  3,000 Units SubCUTAneous Once per day on Mon Wed Fri    sodium chloride flush  5-40 mL IntraVENous 2 times per day    clindamycin (CLEOCIN) IV  600 mg IntraVENous Q8H    insulin lispro  0-6 Units SubCUTAneous TID WC    insulin lispro  0-3 Units SubCUTAneous Nightly    [Held by provider] amLODIPine  5 mg Oral Daily    [Held by provider] bumetanide  2 mg Oral BID    levothyroxine  75 mcg Oral Daily    metoprolol tartrate  50 mg Oral BID    rOPINIRole  0.25 mg Oral Nightly    [Held by provider] sevelamer  800 mg Oral TID WC    vitamin D  50,000 Units Oral Weekly     PRN Meds:   glucose, 15 g, PRN  dextrose, 12.5 g, PRN  glucagon (rDNA), 1 mg, PRN  dextrose, 100 mL/hr, PRN  metoclopramide, 5 mg, Q6H PRN  diphenhydrAMINE, 25 mg, Q6H PRN  oxyCODONE, 5 mg, Q4H PRN  HYDROmorphone, 1 mg, Q3H PRN  promethazine, 12.5 mg, Q6H PRN  sodium chloride flush, 5-40 mL, PRN  sodium chloride, 25 mL, PRN  ondansetron, 4 mg, Q8H PRN   Or  ondansetron, 4 mg, Q6H PRN  polyethylene glycol, 17 g, Daily PRN  acetaminophen, 650 mg, Q6H PRN   Or  acetaminophen, 650 mg, Q6H PRN  sennosides-docusate sodium, 2 tablet, Nightly PRN      Continuous Infusions:   dextrose      sodium chloride         Review of Systems  All systems reviewed.  All negative except for symptoms mentioned in HPI     OBJECTIVE    /81   Pulse 113   Temp 97.9 °F (36.6 °C) (Oral)   Resp 18   Ht 5' 4\" (1.626 m)   Wt 135 lb 9.3 oz (61.5 kg)   SpO2 98%   BMI 23.27 kg/m²     Intake/Output Summary (Last 24 hours) at 12/11/2021 1514  Last data filed at 12/11/2021 1000  Gross per 24 hour   Intake 600 ml   Output 500 ml   Net 100 ml       Physical examination:  General: awake alert, oriented x3, no abnormal position or movements. HEENT: normocephalic non-traumatic, no exophthalmos   Neck: supple, no LN enlargement, no thyromegaly, no thyroid tenderness, no JVD. Pulm: Clear equal air entry no added sounds, no wheezing or rhonchi    CVS: S1 + S2, no murmur, no heave  Abd: soft lax, no tenderness, no organomegaly, audible bowel sounds. Skin: warm, no lesions, no rash.     Feet: sensory exam of the feet is decreased   Neuro: CN intact, muscle power normal  Psych: normal mood, and affect    Review of Laboratory Data:  I personally reviewed the following labs:   Recent Labs     12/09/21  0458 12/10/21  0615 12/11/21  0553   WBC 8.2 4.3* 5.3   RBC 3.80 2.86* 3.21*   HGB 10.7* 9.2* 8.9*   HCT 34.4 26.6* 29.4*   MCV 90.5 93.0 91.6   MCH 28.2 32.2 27.7   MCHC 31.1* 34.6* 30.3*   RDW 16.7* 17.4* 17.5*    176 258   MPV 11.1 12.6* 10.6     Recent Labs     12/10/21  0615 12/10/21  0928 12/11/21  0553   * 131* 133   K 3.0* 4.7 3.9   CL 90* 102 100   CO2 20* 20* 23   BUN 6 6 9   CREATININE 2.9* 3.3* 4.6*   GLUCOSE 846* 51* 62*   CALCIUM 7.7* 7.8* 7.8*     Beta-Hydroxybutyrate   Date Value Ref Range Status   11/22/2021 0.53 (H) 0.02 - 0.27 mmol/L Final   11/11/2021 0.20 0.02 - 0.27 mmol/L Final   09/28/2021 0.60 (H) 0.02 - 0.27 mmol/L Final     Lab Results   Component Value Date    LABA1C 8.7 12/08/2021    LABA1C 10.2 11/23/2021    LABA1C 10.6 09/28/2021     Lab Results   Component Value Date/Time    TSH 5.000 (H) 11/24/2021 08:52 AM    T4FREE 1.16 11/24/2021 08:52 AM    H4MEEGD 8.6 11/05/2015 02:20 AM    FT3 1.3 (L) 10/31/2020 10:43 AM    P8KWDNO 36.73 (L) 07/20/2020 02:00 PM     Lab Results   Component Value Date    LABA1C 8.7 12/08/2021    GLUCOSE 62 12/11/2021    GLUCOSE 130 05/18/2012    MALBCR 2949.3 01/15/2020    LABMICR 1740.1 01/15/2020    LABCREA 59 01/15/2020    LABCREA 61 01/15/2020     Lab Results   Component Value Date    TRIG 82 01/14/2020    HDL 33 01/14/2020    LDLCALC 46 01/14/2020    CHOL 95 01/14/2020       Blood culture   Lab Results   Component Value Date    BC 5 Days no growth 11/22/2021    BC 5 Days no growth 11/13/2021       Radiology:  IR US GUIDED PARACENTESIS   Final Result   Successful paracentesis. CT ABDOMEN PELVIS WO CONTRAST Additional Contrast? None   Final Result   Severe ascites is the predominant finding in the abdomen and pelvis. XR CHEST PORTABLE   Final Result   Improving atelectasis versus infiltrate in lower lungs. Medical Records/Labs/Images review:   I personally reviewed and summarized previous records   All labs and imaging were reviewed independently     [address] Maldonado, a 36 y.o.-old female seen today for inpatient diabetes management     Diabetes Mellitus type I    · Patient's diabetes is uncontrolled, labile blood glucose. · will change diabetes regimen to  · Lantus 10 units at nightly   · Low dose sliding scale with meals and at night   · Continue glucose check with meals and at bedtime   · Will titrate insulin dose based on the blood glucose trend & insulin requirement  · Will arrange a meeting with our pump  after discharge to restart insulin pump and CMG   · Pt will follow with us after discharge. Endocrine follow up visit, Thursday 1/6 at 11:15AM     primary Hypothyroidism  · On levothyroxine 75 mcg daily.  Pt admit missing doses   · To recheck TFT 6-8 weeks after discharge     Abdominal pain  · Management per primary and GI service     ESRD  · Pt at risk for hypoglycemia  · On dialysis, nephrology following    The above issues were reviewed with the patient who understood and agreed with the plan. Thank you for allowing us to participate in the care of this patient. Please do not hesitate to contact us with any additional questions. Sudarshan Tabares MD  Endocrinologist, [facility] Diabetes Care and Endocrinology   [address] [place] [address]   Phone: [phone]  Fax: [phone]  ----------------------------  An electronic signature was used to authenticate this note.  Eda Miller MD on 12/11/2021 at 3:14 PM

## 2021-12-11 NOTE — PROGRESS NOTES
Department of Internal Medicine  Nephrology Progress Note    Events Reviewed. Had dialysis this morning. Subjective: We are following Miss Rajni Rodas for ESRD on Hemodialysis. She states she still has some abdominal pain after eating.  Sitting up on the side of the bed eating lunch     PHYSICAL EXAM:      Vitals:    VITALS:  /81   Pulse 113   Temp 97.9 °F (36.6 °C) (Oral)   Resp 18   Ht 5' 4\" (1.626 m)   Wt 135 lb 9.3 oz (61.5 kg)   SpO2 98%   BMI 23.27 kg/m²   24HR INTAKE/OUTPUT:      Intake/Output Summary (Last 24 hours) at 12/11/2021 1401  Last data filed at 12/11/2021 1000  Gross per 24 hour   Intake 600 ml   Output 500 ml   Net 100 ml       Access: RIJ tunneled dialysis catheter  Constitutional:  Awake, alert, oriented x3,   HEENT:  PERRL, normocephalic, atraumatic  Respiratory:  CTA bilateral   Cardiovascular/Edema:  ST, RRR, no murmur gallop or rub  Gastrointestinal:  abd distended, c/o persistent abdominal pain  Neurologic:  Awake and alert,, follows commands, no focal deficit  Skin:  Warm, dry, ecchymotic areas to abdomen  Other:      Scheduled Meds:   insulin glargine  12 Units SubCUTAneous Nightly    docusate sodium  100 mg Oral Daily    heparin (porcine)  5,000 Units SubCUTAneous Q8H    levofloxacin  500 mg IntraVENous Q48H    metoclopramide  5 mg IntraVENous 4x Daily AC & HS    pantoprazole  40 mg IntraVENous Daily    And    sodium chloride (PF)  10 mL IntraVENous Daily    epoetin nena-epbx  3,000 Units SubCUTAneous Once per day on Mon Wed Fri    sodium chloride flush  5-40 mL IntraVENous 2 times per day    clindamycin (CLEOCIN) IV  600 mg IntraVENous Q8H    insulin lispro  0-6 Units SubCUTAneous TID WC    insulin lispro  0-3 Units SubCUTAneous Nightly    [Held by provider] amLODIPine  5 mg Oral Daily    [Held by provider] bumetanide  2 mg Oral BID    levothyroxine  75 mcg Oral Daily    metoprolol tartrate  50 mg Oral BID    rOPINIRole  0.25 mg Oral Nightly    [Held by provider] sevelamer  800 mg Oral TID     vitamin D  50,000 Units Oral Weekly     Continuous Infusions:   dextrose      sodium chloride       PRN Meds:.glucose, dextrose, glucagon (rDNA), dextrose, metoclopramide, diphenhydrAMINE, oxyCODONE, HYDROmorphone, promethazine, sodium chloride flush, sodium chloride, ondansetron **OR** ondansetron, polyethylene glycol, acetaminophen **OR** acetaminophen, sennosides-docusate sodium        DATA:    CBC with Differential:    Lab Results   Component Value Date    WBC 5.3 12/11/2021    RBC 3.21 12/11/2021    HGB 8.9 12/11/2021    HCT 29.4 12/11/2021     12/11/2021    MCV 91.6 12/11/2021    MCH 27.7 12/11/2021    MCHC 30.3 12/11/2021    RDW 17.5 12/11/2021    NRBC 0.9 08/10/2020    SEGSPCT 67 06/27/2013    METASPCT 1.7 08/10/2020    LYMPHOPCT 16.4 12/07/2021    MONOPCT 7.2 12/07/2021    BASOPCT 1.1 12/07/2021    MONOSABS 0.46 12/07/2021    LYMPHSABS 1.05 12/07/2021    EOSABS 0.14 12/07/2021    BASOSABS 0.07 12/07/2021     CMP:    Lab Results   Component Value Date     12/11/2021    K 3.9 12/11/2021    K 4.2 11/24/2021     12/11/2021    CO2 23 12/11/2021    BUN 9 12/11/2021    CREATININE 4.6 12/11/2021    GFRAA 14 12/11/2021    LABGLOM 14 12/11/2021    GLUCOSE 62 12/11/2021    GLUCOSE 130 05/18/2012    PROT 6.1 12/07/2021    LABALBU 3.1 12/07/2021    LABALBU 4.1 05/18/2012    CALCIUM 7.8 12/11/2021    BILITOT <0.2 12/07/2021    ALKPHOS 129 12/07/2021    AST 5 12/07/2021    ALT <5 12/07/2021     Magnesium:    Lab Results   Component Value Date    MG 2.0 12/11/2021     Phosphorus:    Lab Results   Component Value Date    PHOS 2.8 12/11/2021     Radiology Review:    CT Abdomen and Pelvis - December 7, 2021   Severe ascites is the predominant finding in the abdomen and pelvis. Chest X-Ray December 7, 2021   Improving atelectasis versus infiltrate in lower lungs.          BRIEF SUMMARY OF INITIAL CONSULT: Briefly, Miss Tri Gallardo is a 36year-old female with a PMH of ESRD on home hemodialysis 5 days/wk, (initiated September 2021, Type I DM, poorly controlled with recurrent admissions for DKA, HTN, gastroparesis, ascites, infective endocarditis, cardiac arrest, hypothyroidism and depression. She was admitted to [facility] on December 7, 2021, after presenting to the ED with complaints of chest pain, SOB, nausea and vomiting x 1 week, weakness and decreased appetite. She denies missing any hemodialysis treatments. We are consulted for dialysis management. Problems resolved:  Hypokalemia, multifactorial - 2/2 GI losses from vomiting, diarrhea and poor oral intake in the setting of loop diuretic administration (takes Bumex at home). Improved. IMPRESSION/RECOMMENDATIONS:      1. ESRD on home hemodialysis 5 days/wk via RIJ tunneled dialysis catheter. HD initiated September 2021 after failed peritoneal dialysis with persistent hyperkalemia. For dialysis three times/wk TU,TH,Sat while inpatient. Had dialysis this morning, tolerated well. May need daily dialysis for possible nephrogenic ascites. 2. Hypotonic hyponatremia, secondary to lack of water excretion (ESRD) in the setting of IV fluid administration with PPN. Sodium level improving with discontinuation of IV fluids and PPN. 3. HTN, on metoprolol; amlodipine on hold due to hypotension   4. Vitamin D deficiency, on ergocalciferol   5. MBD of CKD, on sevelamer at home, currently on hold   6. Anemia of CKD,  on epoetin alpha 3,000 unit 3 times/wk  7. Ascites, etiology? Possibly nephrogenic ascites , s/p paracentesis. GI following. May need daily dialysis   -------------------------------------------------------------------------------  8. Type I DM, poorly controlled with frequent readmissions for DKA, endocrinology consulted    9. Restless leg syndrome, on ropinirole at home  10. Depression, on Abilify  11. Hypothyroidism, on levothyroxine   12.  History of gastroparesis, on metoclopramide PLAN:    · Continue HD TU-TH-Sat while inpatient  · Continue epoetin alpha 3,000 units three times weekly   · Continue pantoprazole 40 mg IV daily  · Continue metoclopramide 5 mg IV QID  · Continue to hold Bumex  · Continue to hold amlodipine   · Continue to hold sevelamer  · Continue to monitor labs daily       Electronically signed by HEBER Carson CNP on 12/11/2021 at 2:01 PM     Dr. Lisa Bruce locum tenens covering for Dr. Brooklyn Coon

## 2021-12-12 VITALS
BODY MASS INDEX: 23.15 KG/M2 | HEIGHT: 64 IN | TEMPERATURE: 98 F | WEIGHT: 135.58 LBS | RESPIRATION RATE: 18 BRPM | DIASTOLIC BLOOD PRESSURE: 78 MMHG | OXYGEN SATURATION: 100 % | SYSTOLIC BLOOD PRESSURE: 116 MMHG | HEART RATE: 86 BPM

## 2021-12-12 PROBLEM — R18.8 ASCITES: Status: RESOLVED | Noted: 2021-12-07 | Resolved: 2021-12-12

## 2021-12-12 LAB
ANION GAP SERPL CALCULATED.3IONS-SCNC: 9 MMOL/L (ref 7–16)
BODY FLUID CULTURE, STERILE: NORMAL
BUN BLDV-MCNC: 12 MG/DL (ref 6–20)
CALCIUM SERPL-MCNC: 7.9 MG/DL (ref 8.6–10.2)
CHLORIDE BLD-SCNC: 106 MMOL/L (ref 98–107)
CO2: 26 MMOL/L (ref 22–29)
CREAT SERPL-MCNC: 3.2 MG/DL (ref 0.5–1)
GFR AFRICAN AMERICAN: 21
GFR NON-AFRICAN AMERICAN: 21 ML/MIN/1.73
GLUCOSE BLD-MCNC: 106 MG/DL (ref 74–99)
GRAM STAIN RESULT: NORMAL
HCT VFR BLD CALC: 27.4 % (ref 34–48)
HEMOGLOBIN: 8.3 G/DL (ref 11.5–15.5)
MAGNESIUM: 1.7 MG/DL (ref 1.6–2.6)
MCH RBC QN AUTO: 27.1 PG (ref 26–35)
MCHC RBC AUTO-ENTMCNC: 30.3 % (ref 32–34.5)
MCV RBC AUTO: 89.5 FL (ref 80–99.9)
METER GLUCOSE: 118 MG/DL (ref 74–99)
METER GLUCOSE: 176 MG/DL (ref 74–99)
METER GLUCOSE: 43 MG/DL (ref 74–99)
METER GLUCOSE: 56 MG/DL (ref 74–99)
METER GLUCOSE: 70 MG/DL (ref 74–99)
METER GLUCOSE: 86 MG/DL (ref 74–99)
PDW BLD-RTO: 17.7 FL (ref 11.5–15)
PHOSPHORUS: 3.3 MG/DL (ref 2.5–4.5)
PLATELET # BLD: 291 E9/L (ref 130–450)
PMV BLD AUTO: 11.4 FL (ref 7–12)
POTASSIUM SERPL-SCNC: 4.2 MMOL/L (ref 3.5–5)
RBC # BLD: 3.06 E12/L (ref 3.5–5.5)
SODIUM BLD-SCNC: 141 MMOL/L (ref 132–146)
WBC # BLD: 5.8 E9/L (ref 4.5–11.5)

## 2021-12-12 PROCEDURE — 6370000000 HC RX 637 (ALT 250 FOR IP): Performed by: INTERNAL MEDICINE

## 2021-12-12 PROCEDURE — 6360000002 HC RX W HCPCS: Performed by: INTERNAL MEDICINE

## 2021-12-12 PROCEDURE — 2500000003 HC RX 250 WO HCPCS: Performed by: INTERNAL MEDICINE

## 2021-12-12 PROCEDURE — 83735 ASSAY OF MAGNESIUM: CPT

## 2021-12-12 PROCEDURE — 82962 GLUCOSE BLOOD TEST: CPT

## 2021-12-12 PROCEDURE — C9113 INJ PANTOPRAZOLE SODIUM, VIA: HCPCS | Performed by: NURSE PRACTITIONER

## 2021-12-12 PROCEDURE — 6360000002 HC RX W HCPCS: Performed by: NURSE PRACTITIONER

## 2021-12-12 PROCEDURE — 85027 COMPLETE CBC AUTOMATED: CPT

## 2021-12-12 PROCEDURE — 2580000003 HC RX 258: Performed by: NURSE PRACTITIONER

## 2021-12-12 PROCEDURE — P9041 ALBUMIN (HUMAN),5%, 50ML: HCPCS | Performed by: INTERNAL MEDICINE

## 2021-12-12 PROCEDURE — 36415 COLL VENOUS BLD VENIPUNCTURE: CPT

## 2021-12-12 PROCEDURE — 84100 ASSAY OF PHOSPHORUS: CPT

## 2021-12-12 PROCEDURE — 99232 SBSQ HOSP IP/OBS MODERATE 35: CPT | Performed by: INTERNAL MEDICINE

## 2021-12-12 PROCEDURE — 80048 BASIC METABOLIC PNL TOTAL CA: CPT

## 2021-12-12 PROCEDURE — 2580000003 HC RX 258: Performed by: INTERNAL MEDICINE

## 2021-12-12 RX ORDER — LEVOFLOXACIN 500 MG/1
500 TABLET, FILM COATED ORAL DAILY
Qty: 10 TABLET | Refills: 0 | Status: SHIPPED | OUTPATIENT
Start: 2021-12-12 | End: 2021-12-17 | Stop reason: ALTCHOICE

## 2021-12-12 RX ORDER — DOCUSATE SODIUM 100 MG/1
100 CAPSULE, LIQUID FILLED ORAL DAILY
Qty: 30 CAPSULE | Refills: 0 | Status: ON HOLD | OUTPATIENT
Start: 2021-12-13 | End: 2022-03-03 | Stop reason: HOSPADM

## 2021-12-12 RX ORDER — METOCLOPRAMIDE 5 MG/1
5 TABLET ORAL 3 TIMES DAILY
Qty: 120 TABLET | Refills: 3 | Status: SHIPPED | OUTPATIENT
Start: 2021-12-12 | End: 2021-12-17 | Stop reason: ALTCHOICE

## 2021-12-12 RX ADMIN — SODIUM CHLORIDE, PRESERVATIVE FREE 10 ML: 5 INJECTION INTRAVENOUS at 09:21

## 2021-12-12 RX ADMIN — LOPERAMIDE HYDROCHLORIDE 2 MG: 2 CAPSULE ORAL at 01:03

## 2021-12-12 RX ADMIN — LEVOTHYROXINE SODIUM 75 MCG: 0.07 TABLET ORAL at 06:29

## 2021-12-12 RX ADMIN — HYDROMORPHONE HYDROCHLORIDE 1 MG: 1 INJECTION, SOLUTION INTRAMUSCULAR; INTRAVENOUS; SUBCUTANEOUS at 04:35

## 2021-12-12 RX ADMIN — METOPROLOL TARTRATE 50 MG: 50 TABLET, FILM COATED ORAL at 09:22

## 2021-12-12 RX ADMIN — ALBUMIN (HUMAN) 25 G: 12.5 INJECTION, SOLUTION INTRAVENOUS at 01:03

## 2021-12-12 RX ADMIN — DEXTROSE MONOHYDRATE 12.5 G: 25 INJECTION, SOLUTION INTRAVENOUS at 03:35

## 2021-12-12 RX ADMIN — METOCLOPRAMIDE HYDROCHLORIDE 5 MG: 5 INJECTION INTRAMUSCULAR; INTRAVENOUS at 06:29

## 2021-12-12 RX ADMIN — CLINDAMYCIN PHOSPHATE 600 MG: 600 INJECTION, SOLUTION INTRAVENOUS at 03:43

## 2021-12-12 RX ADMIN — LOPERAMIDE HYDROCHLORIDE 2 MG: 2 CAPSULE ORAL at 11:24

## 2021-12-12 RX ADMIN — HYDROMORPHONE HYDROCHLORIDE 1 MG: 1 INJECTION, SOLUTION INTRAMUSCULAR; INTRAVENOUS; SUBCUTANEOUS at 09:28

## 2021-12-12 RX ADMIN — PANTOPRAZOLE SODIUM 40 MG: 40 INJECTION, POWDER, FOR SOLUTION INTRAVENOUS at 09:21

## 2021-12-12 RX ADMIN — HEPARIN SODIUM 5000 UNITS: 10000 INJECTION INTRAVENOUS; SUBCUTANEOUS at 05:50

## 2021-12-12 RX ADMIN — SODIUM CHLORIDE, PRESERVATIVE FREE 10 ML: 5 INJECTION INTRAVENOUS at 09:23

## 2021-12-12 RX ADMIN — OXYCODONE 5 MG: 5 TABLET ORAL at 11:24

## 2021-12-12 RX ADMIN — OXYCODONE 5 MG: 5 TABLET ORAL at 15:41

## 2021-12-12 ASSESSMENT — PAIN SCALES - GENERAL
PAINLEVEL_OUTOF10: 8
PAINLEVEL_OUTOF10: 9
PAINLEVEL_OUTOF10: 8
PAINLEVEL_OUTOF10: 8

## 2021-12-12 NOTE — PROGRESS NOTES
ENDOCRINOLOGY PROGRESS NOTE      Date of admission: 12/7/2021  Date of service: 12/12/2021  Admitting physician: Didi Wheat MD   Primary Care Physician: Rosita Kruger MD  Consultant physician: Ramakrishna Bowen MD     Reason for the consultation:  Uncontrolled DM,labile BG    History of Present Illness: The history is provided by the patient. Accuracy of the patient data is excellent    [address] Maldonado is a very pleasant 36 y.o. old female with PMH of Type I DM, ESRD on PD,HTN,hypothyroidism. Infective endocarditis and other listed below admitted to [facility] on 12/7/2021 because of abdominal pain.  Endocrine service was consulted for DM management     Subjective   Seen and examined, no acute issues overnight     Inpatient diet:   Renal/Carb Restricted diet     Point of care glucose monitoring (Independently reviewed)   Recent Labs     12/11/21  1804 12/11/21  2102 12/12/21  0309 12/12/21  0326 12/12/21  0408 12/12/21  0554 12/12/21  1138 12/12/21  1445   GLUMET 178* 94 43* 56* 176* 118* 70* 86     Scheduled Meds:   [Held by provider] insulin glargine  12 Units SubCUTAneous Nightly    docusate sodium  100 mg Oral Daily    heparin (porcine)  5,000 Units SubCUTAneous Q8H    levofloxacin  500 mg IntraVENous Q48H    pantoprazole  40 mg IntraVENous Daily    And    sodium chloride (PF)  10 mL IntraVENous Daily    epoetin nena-epbx  3,000 Units SubCUTAneous Once per day on Mon Wed Fri    sodium chloride flush  5-40 mL IntraVENous 2 times per day    clindamycin (CLEOCIN) IV  600 mg IntraVENous Q8H    insulin lispro  0-6 Units SubCUTAneous TID WC    insulin lispro  0-3 Units SubCUTAneous Nightly    [Held by provider] amLODIPine  5 mg Oral Daily    [Held by provider] bumetanide  2 mg Oral BID    levothyroxine  75 mcg Oral Daily    metoprolol tartrate  50 mg Oral BID    rOPINIRole  0.25 mg Oral Nightly    [Held by provider] sevelamer  800 mg Oral TID WC    vitamin D  50,000 Units Oral Weekly     PRN Meds: loperamide, 2 mg, 4x Daily PRN  glucose, 15 g, PRN  dextrose, 12.5 g, PRN  glucagon (rDNA), 1 mg, PRN  dextrose, 100 mL/hr, PRN  metoclopramide, 5 mg, Q6H PRN  diphenhydrAMINE, 25 mg, Q6H PRN  oxyCODONE, 5 mg, Q4H PRN  HYDROmorphone, 1 mg, Q3H PRN  promethazine, 12.5 mg, Q6H PRN  sodium chloride flush, 5-40 mL, PRN  sodium chloride, 25 mL, PRN  ondansetron, 4 mg, Q8H PRN   Or  ondansetron, 4 mg, Q6H PRN  polyethylene glycol, 17 g, Daily PRN  acetaminophen, 650 mg, Q6H PRN   Or  acetaminophen, 650 mg, Q6H PRN  sennosides-docusate sodium, 2 tablet, Nightly PRN      Continuous Infusions:   dextrose      sodium chloride         Review of Systems  All systems reviewed. All negative except for symptoms mentioned in HPI     OBJECTIVE    /78   Pulse 86   Temp 98 °F (36.7 °C) (Oral)   Resp 18   Ht 5' 4\" (1.626 m)   Wt 135 lb 9.3 oz (61.5 kg)   SpO2 100%   BMI 23.27 kg/m²   No intake or output data in the 24 hours ending 12/12/21 1633    Physical examination:  General: awake alert, oriented x3, no abnormal position or movements. HEENT: normocephalic non-traumatic, no exophthalmos   Neck: supple, no LN enlargement, no thyromegaly, no thyroid tenderness, no JVD. Pulm: Clear equal air entry no added sounds, no wheezing or rhonchi    CVS: S1 + S2, no murmur, no heave  Abd: soft lax, no tenderness, no organomegaly, audible bowel sounds. Skin: warm, no lesions, no rash.     Feet: sensory exam of the feet is decreased   Neuro: CN intact, muscle power normal  Psych: normal mood, and affect    Review of Laboratory Data:  I personally reviewed the following labs:   Recent Labs     12/10/21  0615 12/11/21  0553 12/12/21  0655   WBC 4.3* 5.3 5.8   RBC 2.86* 3.21* 3.06*   HGB 9.2* 8.9* 8.3*   HCT 26.6* 29.4* 27.4*   MCV 93.0 91.6 89.5   MCH 32.2 27.7 27.1   MCHC 34.6* 30.3* 30.3*   RDW 17.4* 17.5* 17.7*    258 291   MPV 12.6* 10.6 11.4     Recent Labs     12/10/21  0928 12/11/21  0553 12/12/21  0655   * 133 141   K 4.7 3.9 4.2    100 106   CO2 20* 23 26   BUN 6 9 12   CREATININE 3.3* 4.6* 3.2*   GLUCOSE 51* 62* 106*   CALCIUM 7.8* 7.8* 7.9*     Beta-Hydroxybutyrate   Date Value Ref Range Status   11/22/2021 0.53 (H) 0.02 - 0.27 mmol/L Final   11/11/2021 0.20 0.02 - 0.27 mmol/L Final   09/28/2021 0.60 (H) 0.02 - 0.27 mmol/L Final     Lab Results   Component Value Date    LABA1C 8.7 12/08/2021    LABA1C 10.2 11/23/2021    LABA1C 10.6 09/28/2021     Lab Results   Component Value Date/Time    TSH 5.000 (H) 11/24/2021 08:52 AM    T4FREE 1.16 11/24/2021 08:52 AM    Q6LUIPG 8.6 11/05/2015 02:20 AM    FT3 1.3 (L) 10/31/2020 10:43 AM    R6OPGVQ 36.73 (L) 07/20/2020 02:00 PM     Lab Results   Component Value Date    LABA1C 8.7 12/08/2021    GLUCOSE 106 12/12/2021    GLUCOSE 130 05/18/2012    MALBCR 2949.3 01/15/2020    LABMICR 1740.1 01/15/2020    LABCREA 59 01/15/2020    LABCREA 61 01/15/2020     Lab Results   Component Value Date    TRIG 82 01/14/2020    HDL 33 01/14/2020    LDLCALC 46 01/14/2020    CHOL 95 01/14/2020       Blood culture   Lab Results   Component Value Date    BC 5 Days no growth 11/22/2021    BC 5 Days no growth 11/13/2021       Radiology:  IR US GUIDED PARACENTESIS   Final Result   Successful paracentesis. CT ABDOMEN PELVIS WO CONTRAST Additional Contrast? None   Final Result   Severe ascites is the predominant finding in the abdomen and pelvis. XR CHEST PORTABLE   Final Result   Improving atelectasis versus infiltrate in lower lungs. Medical Records/Labs/Images review:   I personally reviewed and summarized previous records   All labs and imaging were reviewed independently     [address] Maldonado, a 36 y.o.-old female seen today for inpatient diabetes management     Diabetes Mellitus type I    · Patient's diabetes is uncontrolled, labile blood glucose.   · will change diabetes regimen to  · Lantus 12 units at nightly   · Low dose sliding scale with meals and at night   · Continue glucose check with meals and at bedtime   · Will titrate insulin dose based on the blood glucose trend & insulin requirement  · Will arrange a meeting with our pump  after discharge to restart insulin pump and CMG   · Pt will follow with us after discharge. Endocrine follow up visit, Thursday 1/6 at 11:15AM     primary Hypothyroidism  · On levothyroxine 75 mcg daily. Pt admit missing doses   · To recheck TFT 6-8 weeks after discharge     Abdominal pain  · Management per primary and GI service     ESRD  · Pt at risk for hypoglycemia  · On dialysis, nephrology following    The above issues were reviewed with the patient who understood and agreed with the plan. Thank you for allowing us to participate in the care of this patient. Please do not hesitate to contact us with any additional questions. Lucian Miller MD  Endocrinologist, [facility]   [address] [place] [address]   Phone: [phone]  Fax: [phone]  ----------------------------  An electronic signature was used to authenticate this note.  Rosa Steiner MD on 12/12/2021 at 4:33 PM

## 2021-12-12 NOTE — DISCHARGE SUMMARY
Hospital Medicine Discharge Summary    Patient ID: James Gama      Patient's PCP: Valentina Carrasco MD    Admit Date: 2021     Discharge Date:  2021    Admitting Physician: Kathryn Roberts MD     Discharge Physician: Ryan Oneil MD     Discharge Diagnoses:    Abdominal pain new onset of ascites    The patient was seen and examined on day of discharge and this discharge summary is in conjunction with any daily progress note from day of discharge.     Hospital Course:   [facility], U 2 y.o. year old female  who  has a past medical history of Acute congestive heart failure ([address] BC (acute kidney injury) ([address] Cardiac arrest ([address] Cephalgia, Chronic kidney disease, Depression, Diabetes mellitus ([address] Diabetic gastroparesis associated with type 1 diabetes mellitus ([address] Diabetic ketoacidosis ([address] Diabetic ketoacidosis with coma associated with type 1 diabetes mellitus ([address] Diabetic polyneuropathy associated with type 1 diabetes mellitus ([address] Drug use complicating pregnancy in third trimester, Endocarditis, ESRD (end stage renal disease) ([address] H/O cardiovascular stress test, Hemodialysis patient ([address] History of blood transfusion, Hyperlipidemia, Hyperosmolar hyperglycemic state (HHS) ([address] Hypothyroidism, Iron deficiency anemia, MDRO (multiple drug resistant organisms) resistance, MRSA (methicillin resistant Staphylococcus aureus), Non compliance w medication regimen, Other disorders of kidney and ureter, Pregnancy, Previous  delivery affecting pregnancy, antepartum, Previous stillbirth or demise, antepartum, Seizure ([address] Severe pre-eclampsia in third trimester, Shock liver, and Valvular endocarditis.      Patient came to our service with abdominal pain and distention started for about 1 week associated with nausea vomiting.  CT of the abdomen show ascites severe patient denies history of ascites previously.  She stopped using peritoneal dialysis about 1 month ago.  Patient saying that for 1 week her abdomen become bigger and distended pain develop for the last 24-48 hrs. to admission     Patient feeling better, no abdominal pain and pt  able to tolerate diet   Pt denies other complaint. No new event overnight  patient continued to improve and want to go home with follow-up with PCP nephrology as outpatient    Discharge diagnoses  Abdominal pain  Secondary to gastroparesis and new onset of ascites  Rule out SBP-s/p paracentesis   CT of the abdomen shows severe ascites  consult GI dated appreciated  Empirically started on IV Levaquin and Clinda  Discharged on p.o. Levaquin  Patient has multiple allergies to IV antibiotics  PPN was DC  per nephrology  Patient continue on a diet which she is tolerating     New onset of ascites  Patient previously had shock liver in the third trimester with severe preeclampsia   Status post paracentesis  GI consulted and follow-up     End-stage kidney disease  S/p hemodialysis   On hemodialysis per nephrology  Nephrology consulted and follow-up  Patient used to have peritoneal dialysis daily  Stopped peritoneal dialysis 1 month ago     Hypokalemia/hypomagnesemia  Secondary to GI loss  Replaced     Anemia  Secondary to anemia of chronic disease  Continue monitor H&H  Transfuse if needed     Diabetes  Poor control with hypoglycemia  Continue insulin sliding scales  Continue home insulin regimen  If blood sugar more than 190 patient can take her home lantus if less then she should hold it follow-up with PCP or endocrinology as outpatient for adjustment to her insulin regimen    Exam:     /78   Pulse 86   Temp 98 °F (36.7 °C) (Oral)   Resp 18   Ht 5' 4\" (1.626 m)   Wt 135 lb 9.3 oz (61.5 kg)   SpO2 100%   BMI 23.27 kg/m²     General appearance: No apparent distress, appears stated age and cooperative. HEENT: Pupils equal, round, and reactive to light. Conjunctivae/corneas clear. Neck: Supple, with full range of motion.  No jugular venous distention. Trachea midline. Respiratory:  Normal respiratory effort. Clear to auscultation, bilaterally without Rales/Wheezes/Rhonchi. Cardiovascular: Regular rate and rhythm with normal S1/S2 without murmurs, rubs or gallops. Abdomen: Soft, non-tender, non-distended with normal bowel sounds. Musculoskeletal: No clubbing, cyanosis or edema bilaterally. Full range of motion without deformity. Skin: Skin color, texture, turgor normal.  No rashes or lesions. Neurologic:  Neurovascularly intact without any focal sensory/motor deficits. Cranial nerves: II-XII intact, grossly non-focal.  Psychiatric: Alert and oriented, thought content appropriate, normal insight      Consults:     IP CONSULT TO INTERNAL MEDICINE  IP CONSULT TO GI  IP CONSULT TO NEPHROLOGY  IP CONSULT TO ENDOCRINOLOGY    Significant Diagnostic Studies:       Disposition:  Home     Discharge Instructions/Follow-up: nephrology ,pcp     Code Status:  Full Code     Activity: activity as tolerated    Diet: diabetic diet    Labs:  For convenience and continuity at follow-up the following most recent labs are provided:      CBC:    Lab Results   Component Value Date    WBC 5.8 12/12/2021    HGB 8.3 12/12/2021    HCT 27.4 12/12/2021     12/12/2021       Renal:    Lab Results   Component Value Date     12/12/2021    K 4.2 12/12/2021    K 4.2 11/24/2021     12/12/2021    CO2 26 12/12/2021    BUN 12 12/12/2021    CREATININE 3.2 12/12/2021    CALCIUM 7.9 12/12/2021    PHOS 3.3 12/12/2021       Discharge Medications:     Current Discharge Medication List           Details   docusate sodium (COLACE) 100 MG capsule Take 1 capsule by mouth daily  Qty: 30 capsule, Refills: 0      metoclopramide (REGLAN) 5 MG tablet Take 1 tablet by mouth 3 times daily  Qty: 120 tablet, Refills: 3      levoFLOXacin (LEVAQUIN) 500 MG tablet Take 1 tablet by mouth daily for 10 days  Qty: 10 tablet, Refills: 0              Details   insulin glargine (LANTUS) 100 UNIT/ML injection vial Inject 6 Units into the skin 2 times daily  Qty: 10 mL, Refills: 3      mirtazapine (REMERON) 15 MG tablet Take 15 mg by mouth nightly      ARIPiprazole (ABILIFY) 5 MG tablet Take 5 mg by mouth nightly      levothyroxine (SYNTHROID) 75 MCG tablet TAKE 1 TABLET BY MOUTH IN THE MORNING BEFORE BREAKFAST  Qty: 60 tablet, Refills: 0      rOPINIRole (REQUIP) 0.25 MG tablet Take 0.25 mg by mouth nightly      sennosides-docusate sodium (SENOKOT-S) 8.6-50 MG tablet Take 2 tablets by mouth nightly as needed for Constipation      vitamin D (ERGOCALCIFEROL) 1.25 MG (34150 UT) CAPS capsule Take 1 capsule by mouth once a week  Qty: 5 capsule, Refills: 0      polyethylene glycol (GLYCOLAX) 17 g packet Take 17 g by mouth daily as needed for Constipation             Time Spent on discharge is more than 45 minutes in the examination, evaluation, counseling and review of medications and discharge plan. Patient is stable at discharge     Signed:    Shi Cameron MD   12/12/2021      Thank you Fabien Welsh MD for the opportunity to be involved in this patient's care. If you have any questions or concerns please feel free to contact me at [phone].

## 2021-12-12 NOTE — PROGRESS NOTES
Department of Internal Medicine  Nephrology Progress Note    Events Reviewed. Subjective: We are following Miss Divine Oconnor for ESRD on Hemodialysis. She reports no complaints. PHYSICAL EXAM:      Vitals:    VITALS:  /78   Pulse 86   Temp 98 °F (36.7 °C) (Oral)   Resp 18   Ht 5' 4\" (1.626 m)   Wt 135 lb 9.3 oz (61.5 kg)   SpO2 100%   BMI 23.27 kg/m²   24HR INTAKE/OUTPUT:    No intake or output data in the 24 hours ending 12/12/21 1134    Access: RIJ tunneled dialysis catheter  Constitutional:  Awake, alert, oriented x3,   HEENT:  PERRL, normocephalic, atraumatic  Respiratory:  CTA bilateral   Cardiovascular/Edema:  ST, RRR, no murmur gallop or rub  Gastrointestinal:  abd distended, c/o persistent abdominal pain  Neurologic:  Awake and alert,, follows commands, no focal deficit  Skin:  Warm, dry, ecchymotic areas to abdomen  Other:      Scheduled Meds:   insulin glargine  12 Units SubCUTAneous Nightly    docusate sodium  100 mg Oral Daily    heparin (porcine)  5,000 Units SubCUTAneous Q8H    levofloxacin  500 mg IntraVENous Q48H    pantoprazole  40 mg IntraVENous Daily    And    sodium chloride (PF)  10 mL IntraVENous Daily    epoetin nena-epbx  3,000 Units SubCUTAneous Once per day on Mon Wed Fri    sodium chloride flush  5-40 mL IntraVENous 2 times per day    clindamycin (CLEOCIN) IV  600 mg IntraVENous Q8H    insulin lispro  0-6 Units SubCUTAneous TID WC    insulin lispro  0-3 Units SubCUTAneous Nightly    [Held by provider] amLODIPine  5 mg Oral Daily    [Held by provider] bumetanide  2 mg Oral BID    levothyroxine  75 mcg Oral Daily    metoprolol tartrate  50 mg Oral BID    rOPINIRole  0.25 mg Oral Nightly    [Held by provider] sevelamer  800 mg Oral TID WC    vitamin D  50,000 Units Oral Weekly     Continuous Infusions:   dextrose      sodium chloride       PRN Meds:. loperamide, glucose, dextrose, glucagon (rDNA), dextrose, metoclopramide, diphenhydrAMINE, oxyCODONE, HYDROmorphone, promethazine, sodium chloride flush, sodium chloride, ondansetron **OR** ondansetron, polyethylene glycol, acetaminophen **OR** acetaminophen, sennosides-docusate sodium        DATA:    CBC with Differential:    Lab Results   Component Value Date    WBC 5.8 12/12/2021    RBC 3.06 12/12/2021    HGB 8.3 12/12/2021    HCT 27.4 12/12/2021     12/12/2021    MCV 89.5 12/12/2021    MCH 27.1 12/12/2021    MCHC 30.3 12/12/2021    RDW 17.7 12/12/2021    NRBC 0.9 08/10/2020    SEGSPCT 67 06/27/2013    METASPCT 1.7 08/10/2020    LYMPHOPCT 16.4 12/07/2021    MONOPCT 7.2 12/07/2021    BASOPCT 1.1 12/07/2021    MONOSABS 0.46 12/07/2021    LYMPHSABS 1.05 12/07/2021    EOSABS 0.14 12/07/2021    BASOSABS 0.07 12/07/2021     CMP:    Lab Results   Component Value Date     12/12/2021    K 4.2 12/12/2021    K 4.2 11/24/2021     12/12/2021    CO2 26 12/12/2021    BUN 12 12/12/2021    CREATININE 3.2 12/12/2021    GFRAA 21 12/12/2021    LABGLOM 21 12/12/2021    GLUCOSE 106 12/12/2021    GLUCOSE 130 05/18/2012    PROT 6.1 12/07/2021    LABALBU 3.1 12/07/2021    LABALBU 4.1 05/18/2012    CALCIUM 7.9 12/12/2021    BILITOT <0.2 12/07/2021    ALKPHOS 129 12/07/2021    AST 5 12/07/2021    ALT <5 12/07/2021     Magnesium:    Lab Results   Component Value Date    MG 1.7 12/12/2021     Phosphorus:    Lab Results   Component Value Date    PHOS 3.3 12/12/2021     Radiology Review:    CT Abdomen and Pelvis - December 7, 2021   Severe ascites is the predominant finding in the abdomen and pelvis. Chest X-Ray December 7, 2021   Improving atelectasis versus infiltrate in lower lungs. BRIEF SUMMARY OF INITIAL CONSULT: Briefly, Miss Luis Enrique Long is a 36year-old female with a PMH of ESRD on home hemodialysis 5 days/wk, (initiated September 2021, Type I DM, poorly controlled with recurrent admissions for DKA, HTN, gastroparesis, ascites, infective endocarditis, cardiac arrest, hypothyroidism and depression.  She was admitted to [facility] on December 7, 2021, after presenting to the ED with complaints of chest pain, SOB, nausea and vomiting x 1 week, weakness and decreased appetite. She denies missing any hemodialysis treatments. We are consulted for dialysis management. Problems resolved:  Hypokalemia, multifactorial - 2/2 GI losses from vomiting, diarrhea and poor oral intake in the setting of loop diuretic administration (takes Bumex at home). Improved. IMPRESSION/RECOMMENDATIONS:      1. ESRD on home hemodialysis 5 days/wk via RIJ tunneled dialysis catheter. HD initiated September 2021 after failed peritoneal dialysis with persistent hyperkalemia. For dialysis three times/wk TU,TH,Sat while inpatient. Had dialysis this morning, tolerated well. May need daily dialysis for possible nephrogenic ascites. 2. Hypotonic hyponatremia, secondary to lack of water excretion (ESRD) in the setting of IV fluid administration with PPN. Sodium level improving with discontinuation of IV fluids and PPN. 3. HTN, on metoprolol; amlodipine on hold due to hypotension   4. Vitamin D deficiency, on ergocalciferol   5. MBD of CKD, on sevelamer at home, currently on hold   6. Anemia of CKD,  on epoetin alpha 3,000 unit 3 times/wk  7. Ascites, etiology? Possibly nephrogenic ascites , s/p paracentesis. GI following. May need daily dialysis   -------------------------------------------------------------------------------  8. Type I DM, poorly controlled with frequent readmissions for DKA, endocrinology consulted    9. Restless leg syndrome, on ropinirole at home  10. Depression, on Abilify  11. Hypothyroidism, on levothyroxine   12.  History of gastroparesis, on metoclopramide        PLAN:    · Continue HD TU-TH-Sat while inpatient  · Continue epoetin alpha 3,000 units three times weekly   · Continue pantoprazole 40 mg IV daily  · Continue metoclopramide 5 mg IV QID  · Continue to hold Bumex  · Continue to hold amlodipine   · Continue to hold sevelamer  · Continue to monitor labs daily   · Discharge planning      Electronically signed by HEBER Cruz CNP on 12/12/2021 at 11:34 AM     Dr. Solange Rile locum tenens covering for Dr. Tonio Alvarado

## 2021-12-13 ENCOUNTER — TELEPHONE (OUTPATIENT)
Dept: INTERNAL MEDICINE | Age: 31
End: 2021-12-13

## 2021-12-17 ENCOUNTER — OFFICE VISIT (OUTPATIENT)
Dept: INTERNAL MEDICINE | Age: 31
End: 2021-12-17
Payer: COMMERCIAL

## 2021-12-17 VITALS
DIASTOLIC BLOOD PRESSURE: 75 MMHG | RESPIRATION RATE: 16 BRPM | HEART RATE: 117 BPM | OXYGEN SATURATION: 95 % | TEMPERATURE: 97.9 F | SYSTOLIC BLOOD PRESSURE: 97 MMHG

## 2021-12-17 DIAGNOSIS — Z09 HOSPITAL DISCHARGE FOLLOW-UP: ICD-10-CM

## 2021-12-17 DIAGNOSIS — K31.84 GASTROPARESIS: Primary | ICD-10-CM

## 2021-12-17 PROCEDURE — 99214 OFFICE O/P EST MOD 30 MIN: CPT | Performed by: INTERNAL MEDICINE

## 2021-12-17 PROCEDURE — 99212 OFFICE O/P EST SF 10 MIN: CPT | Performed by: INTERNAL MEDICINE

## 2021-12-17 PROCEDURE — 1111F DSCHRG MED/CURRENT MED MERGE: CPT | Performed by: INTERNAL MEDICINE

## 2021-12-17 RX ORDER — PANTOPRAZOLE SODIUM 40 MG/1
40 TABLET, DELAYED RELEASE ORAL 2 TIMES DAILY
Qty: 30 TABLET | Refills: 5 | Status: SHIPPED
Start: 2021-12-17 | End: 2022-02-22

## 2021-12-17 RX ORDER — SEVELAMER CARBONATE 800 MG/1
800 TABLET, FILM COATED ORAL DAILY
COMMUNITY
End: 2022-02-22

## 2021-12-17 RX ORDER — HYDRALAZINE HYDROCHLORIDE 50 MG/1
TABLET, FILM COATED ORAL
COMMUNITY
End: 2021-12-17 | Stop reason: ALTCHOICE

## 2021-12-17 RX ORDER — METOCLOPRAMIDE HYDROCHLORIDE 5 MG/5ML
SOLUTION ORAL
COMMUNITY
End: 2021-12-17 | Stop reason: SDUPTHER

## 2021-12-17 RX ORDER — CARVEDILOL 6.25 MG/1
TABLET ORAL
COMMUNITY
End: 2021-12-17 | Stop reason: ALTCHOICE

## 2021-12-17 RX ORDER — OXYCODONE HYDROCHLORIDE AND ACETAMINOPHEN 5; 325 MG/1; MG/1
TABLET ORAL
Status: ON HOLD | COMMUNITY
Start: 2021-04-21 | End: 2022-01-05 | Stop reason: HOSPADM

## 2021-12-17 RX ORDER — OXYCODONE HYDROCHLORIDE AND ACETAMINOPHEN 5; 325 MG/1; MG/1
TABLET ORAL
COMMUNITY
Start: 2021-05-07 | End: 2021-12-17 | Stop reason: SDUPTHER

## 2021-12-17 RX ORDER — INSULIN LISPRO 100 [IU]/ML
INJECTION, SOLUTION INTRAVENOUS; SUBCUTANEOUS
Status: ON HOLD | COMMUNITY
End: 2022-01-28 | Stop reason: HOSPADM

## 2021-12-17 RX ORDER — ROPINIROLE 0.25 MG/1
TABLET, FILM COATED ORAL
COMMUNITY
End: 2021-12-17 | Stop reason: SDUPTHER

## 2021-12-17 RX ORDER — INSULIN GLARGINE 100 [IU]/ML
INJECTION, SOLUTION SUBCUTANEOUS
COMMUNITY
End: 2021-12-17 | Stop reason: SDUPTHER

## 2021-12-17 RX ORDER — ARIPIPRAZOLE 5 MG/1
TABLET ORAL
COMMUNITY
End: 2021-12-17 | Stop reason: SDUPTHER

## 2021-12-17 RX ORDER — ONDANSETRON 4 MG/1
4 TABLET, FILM COATED ORAL EVERY 8 HOURS PRN
Qty: 12 TABLET | Refills: 0 | Status: CANCELLED | OUTPATIENT
Start: 2021-12-17 | End: 2021-12-22

## 2021-12-17 RX ORDER — MIRTAZAPINE 15 MG/1
TABLET, FILM COATED ORAL
COMMUNITY
End: 2021-12-17 | Stop reason: SDUPTHER

## 2021-12-17 ASSESSMENT — ENCOUNTER SYMPTOMS
RESPIRATORY NEGATIVE: 1
EYES NEGATIVE: 1
CONSTIPATION: 0
NAUSEA: 1
DIARRHEA: 0
ABDOMINAL DISTENTION: 1
ABDOMINAL PAIN: 1
APNEA: 0
COLOR CHANGE: 0
EYE DISCHARGE: 0
ALLERGIC/IMMUNOLOGIC NEGATIVE: 1

## 2021-12-17 NOTE — PROGRESS NOTES
Patient has not knowingly had unprotected exposire to anyone positive for COVID-10 within the last 14 days DENIES    AND does not have the following signs or symptoms:    A. One of the followin. Fever greater than 100.0 F NEGATIVE       2. Cough NEGATIVE       3. New onset shortness of breath NEGATIVE       4. New onset difficulty breathing NEGATIVE    AND/OR    B. Two or more of the following criteria:        1. Muscle aches NEGATIVE       2. Headache NEGATIVE       3. Sore Throat NEGATIVE       4. New onset loss of smell or taste NEGATIVE       5. New onset diarrhea NEGATIVE       6. Chills NEGATIVE       7. Runny nose NEGATIVE       8.  Sneezing NEGATIVE    ++++++++++++++++++++++++++++++++++

## 2021-12-17 NOTE — PROGRESS NOTES
[facility]    Attending Physician Statement  I have discussed the case, including pertinent history and exam findings with the resident physician. I agree with the assessment, plan and orders as documented by the resident. I have reviewed all pertinent PMHx, PSHx, FamHx, SocialHx, medications, and allergies and updated history as appropriate. Patient here for hospital follow up. Discharge summary reviewed. Medications updated. 1. 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 ESRD on HD. BP borderline today and during HD at home - hold all antihypertensive for now. Need to schedule Nephrology and GI follow up. Will discuss bumex and metolazone with Dr Kateryna Mathur - resumed according to mother but BP borderline. No SBP - will discontinue levaquin. 2. Gastroparesis -  Should be on PPI BID  3. PDMP reviewed - she has been gettting regular fills of oxycodone and pregabalin from other office (Lyrica from Nephrology and Percocent from another IM office) - need to clarify. Remainder of medical problems as per resident note. Vidhya Barba MD  12/17/2021 10:33 AM

## 2021-12-17 NOTE — PATIENT INSTRUCTIONS
Follow up in 1 month     Please start taking Protonix two times a day.  Dr. Karen Castañeda office will call so you can make an appointment     Please Decrease your Lantus to 6 units at night only

## 2021-12-17 NOTE — PROGRESS NOTES
Post-Discharge Transitional Care Management Services or Hospital Follow Up      [address] [employer] [facility]   YOB: 1990    Date of Office Visit:  12/17/2021  Date of Hospital Admission: 12/7/21  Date of Hospital Discharge: 12/12/21  Readmission Risk Score(high >=14%.  Medium >=10%):Readmission Risk Score: 43.4 ( )      Care management risk score Rising risk (score 2-5) and Complex Care (Scores >=6): 2     Non face to face  following discharge, date last encounter closed (first attempt may have been earlier): 12/13/2021  4:01 PM 12/13/2021  4:01 PM    Call initiated 2 business days of discharge: Yes     Patient Active Problem List   Diagnosis    Non compliance w medication regimen    Tobacco smoking complicating pregnancy    MTHFR mutation    Generalized abdominal pain    Diabetic ketoacidosis without coma associated with type 1 diabetes mellitus ([address]    Hypertension    Prolonged QT interval    Iron deficiency anemia    Sinus tachycardia    Bladder dysfunction    Hypokalemia    Severe protein-calorie malnutrition ([address]    Uncontrolled type 1 diabetes mellitus with hyperglycemia (HCC)    End stage renal disease ([address]    Hypothyroidism    Hyperlipidemia    Acute cystitis    Nondisplaced fracture of neck of fifth metacarpal bone, left hand, initial encounter for closed fracture    Chronic right-sided low back pain    Endocarditis    Seizure (HCC)    Hyperkalemia    Hypomagnesemia    Hypocalcemia    Intractable nausea and vomiting    Wound of left leg, sequela    Syncope    Type 1 diabetes mellitus without complication (HCC)    Hyperosmolality    Major depressive disorder    Lactic acid acidosis    Early satiety    Acute on chronic systolic CHF (congestive heart failure) (HCC)    Atypical chest pain    Chronic renal failure syndrome    Sepsis ([address]    ESRD (end stage renal disease) ([address]    Hyperosmolar hyperglycemic state (HHS) ([address]    Hypertensive urgency    Nausea    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) [facility]    ESRD (end stage renal disease) on dialysis ([facility]    AMS (altered mental status)    Opioid overdose ([facility]    Anemia    Type 1 diabetes mellitus with chronic kidney disease on chronic dialysis (HCC)    Elevated TSH       Allergies   Allergen Reactions    Cefepime Other (See Comments)     \" neurological side effect- slurred speech and confusion    Toradol [Ketorolac Tromethamine] Anaphylaxis and Hives       Medications listed as ordered at the time of discharge from hospital     Medication List          Accurate as of December 17, 2021 11:11 AM. If you have any questions, ask your nurse or doctor.             START taking these medications    pantoprazole 40 MG tablet  Commonly known as: PROTONIX  Take 1 tablet by mouth 2 times daily  Started by: Isabella Chavis MD        CONTINUE taking these medications    ARIPiprazole 5 MG tablet  Commonly known as: ABILIFY     docusate sodium 100 MG capsule  Commonly known as: COLACE  Take 1 capsule by mouth daily     insulin glargine 100 UNIT/ML injection vial  Commonly known as: LANTUS  Inject 6 Units into the skin 2 times daily     insulin lispro (1 Unit Dial) 100 UNIT/ML Sopn     levothyroxine 75 MCG tablet  Commonly known as: SYNTHROID  TAKE 1 TABLET BY MOUTH IN THE MORNING BEFORE BREAKFAST     mirtazapine 15 MG tablet  Commonly known as: REMERON     Percocet 5-325 MG per tablet  Generic drug: oxyCODONE-acetaminophen     polyethylene glycol 17 g packet  Commonly known as: GLYCOLAX     rOPINIRole 0.25 MG tablet  Commonly known as: REQUIP     sennosides-docusate sodium 8.6-50 MG tablet  Commonly known as: SENOKOT-S  Take 2 tablets by mouth nightly as needed for Constipation     sevelamer 800 MG tablet  Commonly known as: RENVELA     vitamin D 1.25 MG (34061 UT) Caps capsule  Commonly known as: ERGOCALCIFEROL  Take 1 capsule by mouth once a week        STOP taking these medications    carvedilol 6.25 MG tablet  Commonly known as: COREG  Stopped by: Fish Ortiz MD     hydrALAZINE 50 MG tablet  Commonly known as: APRESOLINE  Stopped by: Fish Ortiz MD     levoFLOXacin 500 MG tablet  Commonly known as: Levaquin  Stopped by: Fish Ortiz MD     metoclopramide 5 MG tablet  Commonly known as: Reglan  Stopped by: Fish Ortiz MD           Where to Get Your Medications      These medications were sent to [address] [facility], [address] [phone]  [address] [facility], [address] [place] [address]    Phone: [phone]   · pantoprazole 40 MG tablet           Medications marked \"taking\" at this time  Outpatient Medications Marked as Taking for the 12/17/21 encounter (Office Visit) with Fish Ortiz MD   Medication Sig Dispense Refill    sevelamer (RENVELA) 800 MG tablet TAKE 2 TABLETS BY MOUTH 3 TIMES A DAY WITH MEALS AND TAKE 1 TABLET EVERY DAY AT BEDTIME      oxyCODONE-acetaminophen (PERCOCET) 5-325 MG per tablet 1 tablet as needed      insulin lispro, 1 Unit Dial, 100 UNIT/ML SOPN INJECT 3 UNITS WITH MEALS   SLIDING SCALE.  MAX 30U/DAY      pantoprazole (PROTONIX) 40 MG tablet Take 1 tablet by mouth 2 times daily 30 tablet 5    docusate sodium (COLACE) 100 MG capsule Take 1 capsule by mouth daily 30 capsule 0    insulin glargine (LANTUS) 100 UNIT/ML injection vial Inject 6 Units into the skin 2 times daily 10 mL 3    vitamin D (ERGOCALCIFEROL) 1.25 MG (05747 UT) CAPS capsule Take 1 capsule by mouth once a week 5 capsule 0    mirtazapine (REMERON) 15 MG tablet Take 15 mg by mouth nightly      ARIPiprazole (ABILIFY) 5 MG tablet Take 5 mg by mouth nightly      levothyroxine (SYNTHROID) 75 MCG tablet TAKE 1 TABLET BY MOUTH IN THE MORNING BEFORE BREAKFAST 60 tablet 0    rOPINIRole (REQUIP) 0.25 MG tablet Take 0.25 mg by mouth nightly          Medications patient taking as of now reconciled against medications ordered at time of hospital discharge: Yes    Chief Complaint   Patient presents with   David Boston gabapentin at the time per patient. Every time the patient is discharged from the hospital she is discharged with Percocet. Review of Systems   Constitutional: Negative for activity change and appetite change. HENT: Negative. Negative for congestion. Eyes: Negative. Negative for discharge. Respiratory: Negative. Negative for apnea. Cardiovascular: Negative. Gastrointestinal: Positive for abdominal distention, abdominal pain and nausea. Negative for constipation and diarrhea. Endocrine: Negative. Negative for cold intolerance. Genitourinary: Negative. Negative for difficulty urinating. Musculoskeletal: Negative. Negative for arthralgias. Skin: Negative for color change. Allergic/Immunologic: Negative. Negative for environmental allergies. Neurological: Negative. Negative for dizziness. Hematological: Negative. Negative for adenopathy. Psychiatric/Behavioral: Negative. Negative for agitation. All other systems reviewed and are negative. Vitals:    12/17/21 0944   BP: 97/75   Site: Left Upper Arm   Position: Sitting   Cuff Size: Medium Adult   Pulse: 117   Resp: 16   Temp: 97.9 °F (36.6 °C)   TempSrc: Temporal   SpO2: 95%     There is no height or weight on file to calculate BMI. Wt Readings from Last 3 Encounters:   12/11/21 135 lb 9.3 oz (61.5 kg)   11/24/21 149 lb 14.6 oz (68 kg)   11/15/21 153 lb (69.4 kg)     BP Readings from Last 3 Encounters:   12/17/21 97/75   12/12/21 116/78   11/25/21 115/75       Physical Exam  Vitals and nursing note reviewed. Constitutional:       Appearance: Normal appearance. HENT:      Head: Normocephalic and atraumatic. Cardiovascular:      Rate and Rhythm: Normal rate and regular rhythm. Pulses: Normal pulses. Heart sounds: Normal heart sounds. Pulmonary:      Effort: Pulmonary effort is normal.      Breath sounds: Normal breath sounds. No rhonchi. Abdominal:      General: There is distension (mild). Palpations: Abdomen is soft. There is no mass. Tenderness: There is abdominal tenderness. There is no guarding or rebound. Hernia: No hernia is present. Musculoskeletal:         General: No swelling. Right lower leg: No edema. Left lower leg: No edema. Skin:     General: Skin is warm and dry. Capillary Refill: Capillary refill takes less than 2 seconds. Neurological:      General: No focal deficit present. Mental Status: She is alert and oriented to person, place, and time. Assessment/Plan:  1. Gastroparesis    - pantoprazole (PROTONIX) 40 MG tablet; Take 1 tablet by mouth 2 times daily  Dispense: 30 tablet; Refill: 5    2. Hospital discharge follow-up    -Patient did not have SBP. Levaquin was discontinued  -Reglan was discontinued per GI recommendations, PPI twice daily was prescribed  -The clinic will try to contact with GI clinic  -Discussed with Dr. Lopez Due, pt can continue to hold sevelamer, resume Bumex and metolazone. Patient will likely need intermittent paracentesis for the nephrogenic ascites. -Lantus was cut down to 6 units nightly, patient is following up with Dr. Aram Castro on January 6  -Patient was hypotensive in the hospital and amlodipine was discontinued. Patient had carvedilol as well as hydralazine on the chart, patient has not been taking those medications. Patient's blood pressure is 97/75.  Medications were discontinued  -Discussed the plan with patient's mother as well as patient        Medical Decision Making: high complexity

## 2021-12-17 NOTE — PROGRESS NOTES
Patient given instruction by Dr Carmencita Nathan. Five week follow up scheduled for 1/21/22. Printed AVS given to patient.

## 2021-12-31 ENCOUNTER — APPOINTMENT (OUTPATIENT)
Dept: GENERAL RADIOLOGY | Age: 31
DRG: 720 | End: 2021-12-31
Payer: COMMERCIAL

## 2021-12-31 ENCOUNTER — HOSPITAL ENCOUNTER (INPATIENT)
Age: 31
LOS: 4 days | Discharge: HOME | DRG: 720 | End: 2022-01-05
Attending: EMERGENCY MEDICINE | Admitting: INTERNAL MEDICINE
Payer: COMMERCIAL

## 2021-12-31 DIAGNOSIS — E10.10 TYPE 1 DIABETES MELLITUS WITH KETOACIDOSIS WITHOUT COMA (HCC): Primary | ICD-10-CM

## 2021-12-31 DIAGNOSIS — U07.1 COVID-19: ICD-10-CM

## 2021-12-31 LAB
ALBUMIN SERPL-MCNC: 2.1 G/DL (ref 3.5–5.2)
ALP BLD-CCNC: 125 U/L (ref 35–104)
ALT SERPL-CCNC: 9 U/L (ref 0–32)
ANION GAP SERPL CALCULATED.3IONS-SCNC: 39 MMOL/L (ref 7–16)
AST SERPL-CCNC: 33 U/L (ref 0–31)
BASOPHILS ABSOLUTE: 0.03 E9/L (ref 0–0.2)
BASOPHILS RELATIVE PERCENT: 0.6 % (ref 0–2)
BETA-HYDROXYBUTYRATE: >4.5 MMOL/L (ref 0.02–0.27)
BILIRUB SERPL-MCNC: <0.2 MG/DL (ref 0–1.2)
BUN BLDV-MCNC: 21 MG/DL (ref 6–20)
CALCIUM SERPL-MCNC: 7.2 MG/DL (ref 8.6–10.2)
CHLORIDE BLD-SCNC: 82 MMOL/L (ref 98–107)
CHP ED QC CHECK: NORMAL
CO2: 7 MMOL/L (ref 22–29)
CREAT SERPL-MCNC: 4.2 MG/DL (ref 0.5–1)
EOSINOPHILS ABSOLUTE: 0.01 E9/L (ref 0.05–0.5)
EOSINOPHILS RELATIVE PERCENT: 0.2 % (ref 0–6)
GFR AFRICAN AMERICAN: 15
GFR NON-AFRICAN AMERICAN: 15 ML/MIN/1.73
GLUCOSE BLD-MCNC: 874 MG/DL (ref 74–99)
GLUCOSE BLD-MCNC: NORMAL MG/DL
HCG QUALITATIVE: NEGATIVE
HCT VFR BLD CALC: 35.1 % (ref 34–48)
HEMOGLOBIN: 10.4 G/DL (ref 11.5–15.5)
IMMATURE GRANULOCYTES #: 0.02 E9/L
IMMATURE GRANULOCYTES %: 0.4 % (ref 0–5)
LYMPHOCYTES ABSOLUTE: 1.2 E9/L (ref 1.5–4)
LYMPHOCYTES RELATIVE PERCENT: 24.4 % (ref 20–42)
MCH RBC QN AUTO: 27.1 PG (ref 26–35)
MCHC RBC AUTO-ENTMCNC: 29.6 % (ref 32–34.5)
MCV RBC AUTO: 91.4 FL (ref 80–99.9)
METER GLUCOSE: >500 MG/DL (ref 74–99)
MONOCYTES ABSOLUTE: 0.34 E9/L (ref 0.1–0.95)
MONOCYTES RELATIVE PERCENT: 6.9 % (ref 2–12)
NEUTROPHILS ABSOLUTE: 3.32 E9/L (ref 1.8–7.3)
NEUTROPHILS RELATIVE PERCENT: 67.5 % (ref 43–80)
PDW BLD-RTO: 18.6 FL (ref 11.5–15)
PH VENOUS: 7.28 (ref 7.35–7.45)
PLATELET # BLD: 208 E9/L (ref 130–450)
PMV BLD AUTO: 10.5 FL (ref 7–12)
POTASSIUM SERPL-SCNC: 5 MMOL/L (ref 3.5–5)
RBC # BLD: 3.84 E12/L (ref 3.5–5.5)
SARS-COV-2, NAAT: DETECTED
SODIUM BLD-SCNC: 128 MMOL/L (ref 132–146)
TOTAL PROTEIN: 5.1 G/DL (ref 6.4–8.3)
WBC # BLD: 4.9 E9/L (ref 4.5–11.5)

## 2021-12-31 PROCEDURE — 84703 CHORIONIC GONADOTROPIN ASSAY: CPT

## 2021-12-31 PROCEDURE — 82010 KETONE BODYS QUAN: CPT

## 2021-12-31 PROCEDURE — 82800 BLOOD PH: CPT

## 2021-12-31 PROCEDURE — 82962 GLUCOSE BLOOD TEST: CPT

## 2021-12-31 PROCEDURE — 87635 SARS-COV-2 COVID-19 AMP PRB: CPT

## 2021-12-31 PROCEDURE — 71045 X-RAY EXAM CHEST 1 VIEW: CPT

## 2021-12-31 PROCEDURE — 85025 COMPLETE CBC W/AUTO DIFF WBC: CPT

## 2021-12-31 PROCEDURE — 99285 EMERGENCY DEPT VISIT HI MDM: CPT

## 2021-12-31 PROCEDURE — 80053 COMPREHEN METABOLIC PANEL: CPT

## 2021-12-31 PROCEDURE — 84484 ASSAY OF TROPONIN QUANT: CPT

## 2021-12-31 RX ORDER — ONDANSETRON 2 MG/ML
4 INJECTION INTRAMUSCULAR; INTRAVENOUS ONCE
Status: COMPLETED | OUTPATIENT
Start: 2021-12-31 | End: 2022-01-01

## 2021-12-31 RX ORDER — 0.9 % SODIUM CHLORIDE 0.9 %
1000 INTRAVENOUS SOLUTION INTRAVENOUS ONCE
Status: DISCONTINUED | OUTPATIENT
Start: 2021-12-31 | End: 2021-12-31

## 2021-12-31 RX ORDER — DEXTROSE MONOHYDRATE 50 MG/ML
100 INJECTION, SOLUTION INTRAVENOUS PRN
Status: DISCONTINUED | OUTPATIENT
Start: 2021-12-31 | End: 2022-01-02 | Stop reason: SDUPTHER

## 2021-12-31 RX ORDER — DEXTROSE MONOHYDRATE 25 G/50ML
12.5 INJECTION, SOLUTION INTRAVENOUS PRN
Status: DISCONTINUED | OUTPATIENT
Start: 2021-12-31 | End: 2022-01-02 | Stop reason: SDUPTHER

## 2021-12-31 RX ORDER — NICOTINE POLACRILEX 4 MG
15 LOZENGE BUCCAL PRN
Status: DISCONTINUED | OUTPATIENT
Start: 2021-12-31 | End: 2022-01-02 | Stop reason: SDUPTHER

## 2021-12-31 RX ORDER — 0.9 % SODIUM CHLORIDE 0.9 %
1000 INTRAVENOUS SOLUTION INTRAVENOUS ONCE
Status: COMPLETED | OUTPATIENT
Start: 2021-12-31 | End: 2022-01-01

## 2021-12-31 NOTE — Clinical Note
Patient Class: Inpatient [101]   REQUIRED: Diagnosis: DKA, type 1, not at goal [facility] [[id number]]   Estimated Length of Stay: Estimated stay of more than 2 midnights   Admitting Provider: Lindsey Tomlinson [[id number]]
Yes

## 2022-01-01 PROBLEM — E11.10 DIABETIC ACIDOSIS WITHOUT COMA (HCC): Status: ACTIVE | Noted: 2022-01-01

## 2022-01-01 PROBLEM — E10.10 DKA, TYPE 1, NOT AT GOAL (HCC): Status: ACTIVE | Noted: 2022-01-01

## 2022-01-01 LAB
ANION GAP SERPL CALCULATED.3IONS-SCNC: 13 MMOL/L (ref 7–16)
ANION GAP SERPL CALCULATED.3IONS-SCNC: 15 MMOL/L (ref 7–16)
ANION GAP SERPL CALCULATED.3IONS-SCNC: 20 MMOL/L (ref 7–16)
ANION GAP SERPL CALCULATED.3IONS-SCNC: 27 MMOL/L (ref 7–16)
ANION GAP SERPL CALCULATED.3IONS-SCNC: 33 MMOL/L (ref 7–16)
ANION GAP SERPL CALCULATED.3IONS-SCNC: 40 MMOL/L (ref 7–16)
BUN BLDV-MCNC: 22 MG/DL (ref 6–20)
BUN BLDV-MCNC: 23 MG/DL (ref 6–20)
CALCIUM SERPL-MCNC: 6.7 MG/DL (ref 8.6–10.2)
CALCIUM SERPL-MCNC: 6.9 MG/DL (ref 8.6–10.2)
CALCIUM SERPL-MCNC: 6.9 MG/DL (ref 8.6–10.2)
CALCIUM SERPL-MCNC: 7 MG/DL (ref 8.6–10.2)
CALCIUM SERPL-MCNC: 7 MG/DL (ref 8.6–10.2)
CALCIUM SERPL-MCNC: 7.1 MG/DL (ref 8.6–10.2)
CHLORIDE BLD-SCNC: 86 MMOL/L (ref 98–107)
CHLORIDE BLD-SCNC: 90 MMOL/L (ref 98–107)
CHLORIDE BLD-SCNC: 93 MMOL/L (ref 98–107)
CHLORIDE BLD-SCNC: 95 MMOL/L (ref 98–107)
CHLORIDE BLD-SCNC: 96 MMOL/L (ref 98–107)
CHLORIDE BLD-SCNC: 97 MMOL/L (ref 98–107)
CHP ED QC CHECK: NORMAL
CHP ED QC CHECK: NORMAL
CHP ED QC CHECK: YES
CO2: 11 MMOL/L (ref 22–29)
CO2: 15 MMOL/L (ref 22–29)
CO2: 19 MMOL/L (ref 22–29)
CO2: 23 MMOL/L (ref 22–29)
CO2: 24 MMOL/L (ref 22–29)
CO2: 7 MMOL/L (ref 22–29)
CREAT SERPL-MCNC: 4.5 MG/DL (ref 0.5–1)
CREAT SERPL-MCNC: 4.6 MG/DL (ref 0.5–1)
CREAT SERPL-MCNC: 4.6 MG/DL (ref 0.5–1)
EKG ATRIAL RATE: 102 BPM
EKG ATRIAL RATE: 86 BPM
EKG P AXIS: 54 DEGREES
EKG P AXIS: 68 DEGREES
EKG P-R INTERVAL: 120 MS
EKG P-R INTERVAL: 132 MS
EKG Q-T INTERVAL: 406 MS
EKG Q-T INTERVAL: 420 MS
EKG QRS DURATION: 88 MS
EKG QRS DURATION: 90 MS
EKG QTC CALCULATION (BAZETT): 502 MS
EKG QTC CALCULATION (BAZETT): 529 MS
EKG R AXIS: 50 DEGREES
EKG R AXIS: 83 DEGREES
EKG T AXIS: -39 DEGREES
EKG T AXIS: 2 DEGREES
EKG VENTRICULAR RATE: 102 BPM
EKG VENTRICULAR RATE: 86 BPM
GFR AFRICAN AMERICAN: 14
GFR NON-AFRICAN AMERICAN: 14 ML/MIN/1.73
GLUCOSE BLD-MCNC: 199 MG/DL
GLUCOSE BLD-MCNC: 218 MG/DL (ref 74–99)
GLUCOSE BLD-MCNC: 241 MG/DL
GLUCOSE BLD-MCNC: 251 MG/DL
GLUCOSE BLD-MCNC: 277 MG/DL (ref 74–99)
GLUCOSE BLD-MCNC: 287 MG/DL
GLUCOSE BLD-MCNC: 409 MG/DL (ref 74–99)
GLUCOSE BLD-MCNC: 483 MG/DL
GLUCOSE BLD-MCNC: 485 MG/DL
GLUCOSE BLD-MCNC: 639 MG/DL (ref 74–99)
GLUCOSE BLD-MCNC: 688 MG/DL (ref 74–99)
GLUCOSE BLD-MCNC: 845 MG/DL (ref 74–99)
GLUCOSE BLD-MCNC: NORMAL MG/DL
MAGNESIUM: 1.6 MG/DL (ref 1.6–2.6)
MAGNESIUM: 1.8 MG/DL (ref 1.6–2.6)
MAGNESIUM: 1.8 MG/DL (ref 1.6–2.6)
MAGNESIUM: 2 MG/DL (ref 1.6–2.6)
METER GLUCOSE: 170 MG/DL (ref 74–99)
METER GLUCOSE: 182 MG/DL (ref 74–99)
METER GLUCOSE: 199 MG/DL (ref 74–99)
METER GLUCOSE: 217 MG/DL (ref 74–99)
METER GLUCOSE: 226 MG/DL (ref 74–99)
METER GLUCOSE: 241 MG/DL (ref 74–99)
METER GLUCOSE: 251 MG/DL (ref 74–99)
METER GLUCOSE: 287 MG/DL (ref 74–99)
METER GLUCOSE: 396 MG/DL (ref 74–99)
METER GLUCOSE: 413 MG/DL (ref 74–99)
METER GLUCOSE: 483 MG/DL (ref 74–99)
METER GLUCOSE: 485 MG/DL (ref 74–99)
METER GLUCOSE: >500 MG/DL (ref 74–99)
PH VENOUS: 7.31 (ref 7.35–7.45)
PHOSPHORUS: 2.6 MG/DL (ref 2.5–4.5)
PHOSPHORUS: 3.4 MG/DL (ref 2.5–4.5)
PHOSPHORUS: 3.6 MG/DL (ref 2.5–4.5)
PHOSPHORUS: 4.2 MG/DL (ref 2.5–4.5)
POTASSIUM SERPL-SCNC: 2.3 MMOL/L (ref 3.5–5)
POTASSIUM SERPL-SCNC: 2.7 MMOL/L (ref 3.5–5)
POTASSIUM SERPL-SCNC: 2.8 MMOL/L (ref 3.5–5)
POTASSIUM SERPL-SCNC: 2.9 MMOL/L (ref 3.5–5)
POTASSIUM SERPL-SCNC: 2.9 MMOL/L (ref 3.5–5)
POTASSIUM SERPL-SCNC: 3 MMOL/L (ref 3.5–5)
SODIUM BLD-SCNC: 133 MMOL/L (ref 132–146)
SODIUM BLD-SCNC: 134 MMOL/L (ref 132–146)
SODIUM BLD-SCNC: 135 MMOL/L (ref 132–146)
TROPONIN, HIGH SENSITIVITY: 304 NG/L (ref 0–9)
TROPONIN, HIGH SENSITIVITY: 321 NG/L (ref 0–9)
TROPONIN, HIGH SENSITIVITY: 334 NG/L (ref 0–9)

## 2022-01-01 PROCEDURE — 93005 ELECTROCARDIOGRAM TRACING: CPT | Performed by: EMERGENCY MEDICINE

## 2022-01-01 PROCEDURE — 6360000002 HC RX W HCPCS

## 2022-01-01 PROCEDURE — 82800 BLOOD PH: CPT

## 2022-01-01 PROCEDURE — 99223 1ST HOSP IP/OBS HIGH 75: CPT | Performed by: INTERNAL MEDICINE

## 2022-01-01 PROCEDURE — 6370000000 HC RX 637 (ALT 250 FOR IP)

## 2022-01-01 PROCEDURE — 36415 COLL VENOUS BLD VENIPUNCTURE: CPT

## 2022-01-01 PROCEDURE — 82962 GLUCOSE BLOOD TEST: CPT

## 2022-01-01 PROCEDURE — 93005 ELECTROCARDIOGRAM TRACING: CPT

## 2022-01-01 PROCEDURE — 87081 CULTURE SCREEN ONLY: CPT

## 2022-01-01 PROCEDURE — 84100 ASSAY OF PHOSPHORUS: CPT

## 2022-01-01 PROCEDURE — 2580000003 HC RX 258: Performed by: EMERGENCY MEDICINE

## 2022-01-01 PROCEDURE — 84484 ASSAY OF TROPONIN QUANT: CPT

## 2022-01-01 PROCEDURE — 93010 ELECTROCARDIOGRAM REPORT: CPT | Performed by: INTERNAL MEDICINE

## 2022-01-01 PROCEDURE — 87040 BLOOD CULTURE FOR BACTERIA: CPT

## 2022-01-01 PROCEDURE — 96361 HYDRATE IV INFUSION ADD-ON: CPT

## 2022-01-01 PROCEDURE — 6360000002 HC RX W HCPCS: Performed by: EMERGENCY MEDICINE

## 2022-01-01 PROCEDURE — 2580000003 HC RX 258: Performed by: INTERNAL MEDICINE

## 2022-01-01 PROCEDURE — 6370000000 HC RX 637 (ALT 250 FOR IP): Performed by: EMERGENCY MEDICINE

## 2022-01-01 PROCEDURE — 2500000003 HC RX 250 WO HCPCS: Performed by: INTERNAL MEDICINE

## 2022-01-01 PROCEDURE — 6360000002 HC RX W HCPCS: Performed by: INTERNAL MEDICINE

## 2022-01-01 PROCEDURE — 2580000003 HC RX 258

## 2022-01-01 PROCEDURE — 96372 THER/PROPH/DIAG INJ SC/IM: CPT

## 2022-01-01 PROCEDURE — 83735 ASSAY OF MAGNESIUM: CPT

## 2022-01-01 PROCEDURE — 2500000003 HC RX 250 WO HCPCS

## 2022-01-01 PROCEDURE — 80048 BASIC METABOLIC PNL TOTAL CA: CPT

## 2022-01-01 PROCEDURE — 02HV33Z INSERTION OF INFUSION DEVICE INTO SUPERIOR VENA CAVA, PERCUTANEOUS APPROACH: ICD-10-PCS | Performed by: STUDENT IN AN ORGANIZED HEALTH CARE EDUCATION/TRAINING PROGRAM

## 2022-01-01 PROCEDURE — 36556 INSERT NON-TUNNEL CV CATH: CPT

## 2022-01-01 PROCEDURE — 2000000000 HC ICU R&B

## 2022-01-01 PROCEDURE — 96374 THER/PROPH/DIAG INJ IV PUSH: CPT

## 2022-01-01 RX ORDER — POLYETHYLENE GLYCOL 3350 17 G/17G
17 POWDER, FOR SOLUTION ORAL DAILY PRN
Status: DISCONTINUED | OUTPATIENT
Start: 2022-01-01 | End: 2022-01-05 | Stop reason: HOSPADM

## 2022-01-01 RX ORDER — FENTANYL CITRATE 50 UG/ML
50 INJECTION, SOLUTION INTRAMUSCULAR; INTRAVENOUS ONCE
Status: COMPLETED | OUTPATIENT
Start: 2022-01-01 | End: 2022-01-01

## 2022-01-01 RX ORDER — SODIUM CHLORIDE 0.9 % (FLUSH) 0.9 %
10 SYRINGE (ML) INJECTION PRN
Status: DISCONTINUED | OUTPATIENT
Start: 2022-01-01 | End: 2022-01-05 | Stop reason: HOSPADM

## 2022-01-01 RX ORDER — 0.9 % SODIUM CHLORIDE 0.9 %
1000 INTRAVENOUS SOLUTION INTRAVENOUS ONCE
Status: COMPLETED | OUTPATIENT
Start: 2022-01-01 | End: 2022-01-01

## 2022-01-01 RX ORDER — SODIUM CHLORIDE 9 MG/ML
INJECTION, SOLUTION INTRAVENOUS CONTINUOUS
Status: DISCONTINUED | OUTPATIENT
Start: 2022-01-01 | End: 2022-01-01

## 2022-01-01 RX ORDER — ONDANSETRON 4 MG/1
4 TABLET, ORALLY DISINTEGRATING ORAL EVERY 8 HOURS PRN
Status: DISCONTINUED | OUTPATIENT
Start: 2022-01-01 | End: 2022-01-05 | Stop reason: HOSPADM

## 2022-01-01 RX ORDER — ONDANSETRON 2 MG/ML
4 INJECTION INTRAMUSCULAR; INTRAVENOUS EVERY 6 HOURS PRN
Status: DISCONTINUED | OUTPATIENT
Start: 2022-01-01 | End: 2022-01-05 | Stop reason: HOSPADM

## 2022-01-01 RX ORDER — DEXTROSE AND SODIUM CHLORIDE 5; .45 G/100ML; G/100ML
INJECTION, SOLUTION INTRAVENOUS CONTINUOUS PRN
Status: DISCONTINUED | OUTPATIENT
Start: 2022-01-01 | End: 2022-01-02

## 2022-01-01 RX ORDER — SODIUM CHLORIDE 450 MG/100ML
INJECTION, SOLUTION INTRAVENOUS CONTINUOUS
Status: DISCONTINUED | OUTPATIENT
Start: 2022-01-01 | End: 2022-01-01

## 2022-01-01 RX ORDER — MAGNESIUM SULFATE 1 G/100ML
1000 INJECTION INTRAVENOUS ONCE
Status: COMPLETED | OUTPATIENT
Start: 2022-01-01 | End: 2022-01-01

## 2022-01-01 RX ORDER — ACETAMINOPHEN 650 MG/1
650 SUPPOSITORY RECTAL EVERY 6 HOURS PRN
Status: DISCONTINUED | OUTPATIENT
Start: 2022-01-01 | End: 2022-01-05 | Stop reason: HOSPADM

## 2022-01-01 RX ORDER — DEXTROSE MONOHYDRATE 25 G/50ML
12.5 INJECTION, SOLUTION INTRAVENOUS PRN
Status: DISCONTINUED | OUTPATIENT
Start: 2022-01-01 | End: 2022-01-02 | Stop reason: SDUPTHER

## 2022-01-01 RX ORDER — SODIUM CHLORIDE 0.9 % (FLUSH) 0.9 %
5-40 SYRINGE (ML) INJECTION EVERY 12 HOURS SCHEDULED
Status: DISCONTINUED | OUTPATIENT
Start: 2022-01-01 | End: 2022-01-05 | Stop reason: HOSPADM

## 2022-01-01 RX ORDER — ACETAMINOPHEN 325 MG/1
650 TABLET ORAL EVERY 6 HOURS PRN
Status: DISCONTINUED | OUTPATIENT
Start: 2022-01-01 | End: 2022-01-05 | Stop reason: HOSPADM

## 2022-01-01 RX ORDER — SODIUM CHLORIDE 9 MG/ML
25 INJECTION, SOLUTION INTRAVENOUS PRN
Status: DISCONTINUED | OUTPATIENT
Start: 2022-01-01 | End: 2022-01-05 | Stop reason: HOSPADM

## 2022-01-01 RX ORDER — HEPARIN SODIUM 5000 [USP'U]/ML
5000 INJECTION, SOLUTION INTRAVENOUS; SUBCUTANEOUS EVERY 8 HOURS SCHEDULED
Status: DISCONTINUED | OUTPATIENT
Start: 2022-01-01 | End: 2022-01-05 | Stop reason: HOSPADM

## 2022-01-01 RX ADMIN — Medication 10 ML: at 09:09

## 2022-01-01 RX ADMIN — SODIUM CHLORIDE 1000 ML: 9 INJECTION, SOLUTION INTRAVENOUS at 00:11

## 2022-01-01 RX ADMIN — ONDANSETRON 4 MG: 2 INJECTION INTRAMUSCULAR; INTRAVENOUS at 00:11

## 2022-01-01 RX ADMIN — HEPARIN SODIUM 5000 UNITS: 5000 INJECTION INTRAVENOUS; SUBCUTANEOUS at 07:04

## 2022-01-01 RX ADMIN — Medication 10 ML: at 21:11

## 2022-01-01 RX ADMIN — ACETAMINOPHEN 650 MG: 325 TABLET ORAL at 23:08

## 2022-01-01 RX ADMIN — DOXYCYCLINE 100 MG: 100 INJECTION, POWDER, LYOPHILIZED, FOR SOLUTION INTRAVENOUS at 18:52

## 2022-01-01 RX ADMIN — VANCOMYCIN HYDROCHLORIDE 1250 MG: 5 INJECTION, POWDER, LYOPHILIZED, FOR SOLUTION INTRAVENOUS at 18:53

## 2022-01-01 RX ADMIN — SODIUM CHLORIDE: 9 INJECTION, SOLUTION INTRAVENOUS at 04:36

## 2022-01-01 RX ADMIN — DEXTROSE AND SODIUM CHLORIDE: 5; 450 INJECTION, SOLUTION INTRAVENOUS at 22:03

## 2022-01-01 RX ADMIN — HEPARIN SODIUM 5000 UNITS: 5000 INJECTION INTRAVENOUS; SUBCUTANEOUS at 22:03

## 2022-01-01 RX ADMIN — SODIUM CHLORIDE: 4.5 INJECTION, SOLUTION INTRAVENOUS at 07:04

## 2022-01-01 RX ADMIN — SODIUM CHLORIDE 3.3 UNITS/HR: 9 INJECTION, SOLUTION INTRAVENOUS at 20:59

## 2022-01-01 RX ADMIN — NOREPINEPHRINE BITARTRATE 2 MCG/MIN: 1 INJECTION, SOLUTION, CONCENTRATE INTRAVENOUS at 17:56

## 2022-01-01 RX ADMIN — MAGNESIUM SULFATE HEPTAHYDRATE 1000 MG: 1 INJECTION, SOLUTION INTRAVENOUS at 20:59

## 2022-01-01 RX ADMIN — FENTANYL CITRATE 50 MCG: 0.05 INJECTION, SOLUTION INTRAMUSCULAR; INTRAVENOUS at 02:50

## 2022-01-01 RX ADMIN — PIPERACILLIN SODIUM AND TAZOBACTAM SODIUM 3375 MG: 3; .375 INJECTION, POWDER, LYOPHILIZED, FOR SOLUTION INTRAVENOUS at 20:55

## 2022-01-01 RX ADMIN — ACETAMINOPHEN 650 MG: 325 TABLET ORAL at 14:02

## 2022-01-01 RX ADMIN — POTASSIUM PHOSPHATE, MONOBASIC AND POTASSIUM PHOSPHATE, DIBASIC 15 MMOL: 224; 236 INJECTION, SOLUTION, CONCENTRATE INTRAVENOUS at 20:55

## 2022-01-01 RX ADMIN — SODIUM CHLORIDE 1000 ML: 9 INJECTION, SOLUTION INTRAVENOUS at 17:55

## 2022-01-01 RX ADMIN — SODIUM CHLORIDE 0.1 UNITS/KG/HR: 9 INJECTION, SOLUTION INTRAVENOUS at 02:03

## 2022-01-01 RX ADMIN — HEPARIN SODIUM 5000 UNITS: 5000 INJECTION INTRAVENOUS; SUBCUTANEOUS at 14:03

## 2022-01-01 RX ADMIN — DEXTROSE AND SODIUM CHLORIDE: 5; 450 INJECTION, SOLUTION INTRAVENOUS at 15:23

## 2022-01-01 ASSESSMENT — PAIN SCALES - GENERAL
PAINLEVEL_OUTOF10: 10
PAINLEVEL_OUTOF10: 3
PAINLEVEL_OUTOF10: 7
PAINLEVEL_OUTOF10: 10

## 2022-01-01 ASSESSMENT — PAIN DESCRIPTION - LOCATION
LOCATION: GENERALIZED
LOCATION: GENERALIZED

## 2022-01-01 ASSESSMENT — PAIN DESCRIPTION - DESCRIPTORS: DESCRIPTORS: ACHING

## 2022-01-01 ASSESSMENT — PAIN DESCRIPTION - PAIN TYPE: TYPE: ACUTE PAIN

## 2022-01-01 NOTE — ED NOTES
Dr Breaux Factor in room to insert central line due unable to get another IV access     Miguelito Ho, RN  01/01/22 0084

## 2022-01-01 NOTE — ED PROVIDER NOTES
Patient is a 37 y/o female who presents to the ED via EMS. Patient states \"I don't feel good. \" She states that her \"whole house has Covid. \" Her symptoms began a few days ago and have gradually worsened. She states that her whole body hurts. She is short of breath. She has had a fever. She has had a cough productive of sputum. She has had nausea, vomiting and diarrhea. She did not receive her COVID-19 vaccine. Review of Systems   Constitutional: Positive for fever. Negative for chills. HENT: Negative for ear pain, sinus pressure and sore throat. Eyes: Negative for pain, discharge and redness. Respiratory: Positive for cough and shortness of breath. Negative for wheezing. Cardiovascular: Negative for chest pain. Gastrointestinal: Positive for diarrhea, nausea and vomiting. Negative for abdominal distention. Genitourinary: Negative for dysuria and frequency. Musculoskeletal: Positive for arthralgias. Negative for back pain. Skin: Negative for rash and wound. Neurological: Negative for weakness and headaches. Hematological: Negative for adenopathy. All other systems reviewed and are negative. Physical Exam  Vitals and nursing note reviewed. Constitutional:       General: She is not in acute distress. HENT:      Head: Normocephalic and atraumatic. Right Ear: External ear normal.      Left Ear: External ear normal.      Nose: Nose normal.      Mouth/Throat:      Mouth: Mucous membranes are moist.   Eyes:      Conjunctiva/sclera: Conjunctivae normal.      Pupils: Pupils are equal, round, and reactive to light. Cardiovascular:      Rate and Rhythm: Regular rhythm. Tachycardia present. Heart sounds: No murmur heard. Pulmonary:      Effort: Pulmonary effort is normal. No respiratory distress. Breath sounds: Normal breath sounds. No stridor. No wheezing, rhonchi or rales. Abdominal:      General: Bowel sounds are normal. There is no distension. Palpations: Abdomen is soft. Tenderness: There is no abdominal tenderness. There is no guarding. Musculoskeletal:         General: Normal range of motion. Cervical back: Normal range of motion and neck supple. Skin:     General: Skin is warm and dry. Neurological:      Mental Status: She is alert and oriented to person, place, and time. Procedures     PROCEDURE  1/1/22       Time: 0130. CENTRAL LINE INSERTION  Risks, benefits and alternatives (for applicable procedures below) described. Performed By: EM Resident Georges Urban. Indication: vascular access, need for frequent blood draws and inability to gain peripheral access. Informed consent: Written consent obtained. The patient was counseled regarding the procedure in person, it's indications, risks, potential complications and alternatives and any questions were answered. Consent was obtained. .  Procedure: After routine sterile preparation, local anesthesia obtained by infiltration using 1% Lidocaine without epinephrine. A right 3-Lumen 7F Central Venous Catheter was placed by femoral vein approach and secured by standard fashion. Ultrasound Guidance:   used. Number of Attempts: 1  Post-procedure Findings: A post procedural chest x-ray  was not indicated. Patient tolerated the procedure well.          [facility]             --------------------------------------------- PAST HISTORY ---------------------------------------------  Past Medical History:  has a past medical history of Acute congestive heart failure ([address] BC (acute kidney injury) ([facility] Cardiac arrest ([facility] Cephalgia, Chronic kidney disease, Depression, Diabetes mellitus ([address] Diabetic gastroparesis associated with type 1 diabetes mellitus ([address] Diabetic ketoacidosis ([address] Diabetic ketoacidosis with coma associated with type 1 diabetes mellitus ([address] Diabetic polyneuropathy associated with type 1 diabetes mellitus ([address] Drug use complicating pregnancy in third trimester, Endocarditis, ESRD (end stage renal disease) ([facility] H/O cardiovascular stress test, Hemodialysis patient ([facility] History of blood transfusion, Hyperlipidemia, Hyperosmolar hyperglycemic state (HHS) ([facility] Hypothyroidism, Iron deficiency anemia, MDRO (multiple drug resistant organisms) resistance, MRSA (methicillin resistant Staphylococcus aureus), Non compliance w medication regimen, Other disorders of kidney and ureter, Pregnancy, Previous  delivery affecting pregnancy, antepartum, Previous stillbirth or demise, antepartum, Seizure ([facility] Severe pre-eclampsia in third trimester, Shock liver, and Valvular endocarditis. Past Surgical History:  has a past surgical history that includes ECHO Compl W Dop Color Flow (2012); ECHO Compl W Dop Color Flow (6/10/2013);  section; Tunneled venous port placement (2018); pr esophagogastroduodenoscopy transoral diagnostic (N/A, 2018); back surgery; Colonoscopy (N/A, 2018); Colonoscopy (N/A, 2018); Upper gastrointestinal endoscopy (2018); Upper gastrointestinal endoscopy (N/A, 10/11/2019); Cholecystectomy, laparoscopic (N/A, 2019); LAPAROSCOPY INSERTION PERITONEAL CATHETER (N/A, 2020); transesophageal echocardiogram (N/A, 10/19/2020); embolectomy (N/A, 2020); Foot Debridement (Left, 2021); Foot Debridement (Left, 2021); Upper gastrointestinal endoscopy (N/A, 2021); and Catheter Removal (N/A, 10/1/2021). Social History:  reports that she quit smoking about 11 months ago. Her smoking use included cigarettes. She has a 3.00 pack-year smoking history. She has never used smokeless tobacco. She reports current drug use. Drug: Opiates . She reports that she does not drink alcohol. Family History: family history includes Asthma in her brother and mother; Diabetes in her mother; High Blood Pressure in her father and mother; Hypertension in her mother.      The patients home medications have been reviewed.     Allergies: Cefepime and Toradol [ketorolac tromethamine]    -------------------------------------------------- RESULTS -------------------------------------------------    LABS:  Results for orders placed or performed during the hospital encounter of 12/31/21   COVID-19, Rapid    Specimen: Nasopharyngeal Swab   Result Value Ref Range    SARS-CoV-2, NAAT DETECTED (A) Not Detected   CBC auto differential   Result Value Ref Range    WBC 4.9 4.5 - 11.5 E9/L    RBC 3.84 3.50 - 5.50 E12/L    Hemoglobin 10.4 (L) 11.5 - 15.5 g/dL    Hematocrit 35.1 34.0 - 48.0 %    MCV 91.4 80.0 - 99.9 fL    MCH 27.1 26.0 - 35.0 pg    MCHC 29.6 (L) 32.0 - 34.5 %    RDW 18.6 (H) 11.5 - 15.0 fL    Platelets 250 022 - 836 E9/L    MPV 10.5 7.0 - 12.0 fL    Neutrophils % 67.5 43.0 - 80.0 %    Immature Granulocytes % 0.4 0.0 - 5.0 %    Lymphocytes % 24.4 20.0 - 42.0 %    Monocytes % 6.9 2.0 - 12.0 %    Eosinophils % 0.2 0.0 - 6.0 %    Basophils % 0.6 0.0 - 2.0 %    Neutrophils Absolute 3.32 1.80 - 7.30 E9/L    Immature Granulocytes # 0.02 E9/L    Lymphocytes Absolute 1.20 (L) 1.50 - 4.00 E9/L    Monocytes Absolute 0.34 0.10 - 0.95 E9/L    Eosinophils Absolute 0.01 (L) 0.05 - 0.50 E9/L    Basophils Absolute 0.03 0.00 - 0.20 E9/L   Comprehensive Metabolic Panel   Result Value Ref Range    Sodium 128 (L) 132 - 146 mmol/L    Potassium 5.0 3.5 - 5.0 mmol/L    Chloride 82 (L) 98 - 107 mmol/L    CO2 7 (LL) 22 - 29 mmol/L    Anion Gap 39 (H) 7 - 16 mmol/L    Glucose 874 (HH) 74 - 99 mg/dL    BUN 21 (H) 6 - 20 mg/dL    CREATININE 4.2 (H) 0.5 - 1.0 mg/dL    GFR Non-African American 15 >=60 mL/min/1.73    GFR African American 15     Calcium 7.2 (L) 8.6 - 10.2 mg/dL    Total Protein 5.1 (L) 6.4 - 8.3 g/dL    Albumin 2.1 (L) 3.5 - 5.2 g/dL    Total Bilirubin <0.2 0.0 - 1.2 mg/dL    Alkaline Phosphatase 125 (H) 35 - 104 U/L    ALT 9 0 - 32 U/L    AST 33 (H) 0 - 31 U/L   Beta-Hydroxybutyrate   Result Value Ref Range    Beta-Hydroxybutyrate >4.50 (H) 0.02 - 0.27 mmol/L   PH, VENOUS   Result Value Ref Range    pH, Khurram 7.28 (L) 7.35 - 7.45   HCG, SERUM, QUALITATIVE   Result Value Ref Range    hCG Qual NEGATIVE NEGATIVE   POCT glucose   Result Value Ref Range    Glucose  mg/dL    QC OK? ok    POCT Glucose   Result Value Ref Range    Meter Glucose >500 (H) 74 - 99 mg/dL       RADIOLOGY:  XR CHEST PORTABLE   Final Result   No acute process. Dialysis catheter in place. EKG: Sinus tachycardia with ventricular rate of 102. NJ interval, QRS duration within normal range. Prolonged QT interval. Normal axis. Nonspecific ST segment and T wave changes. ------------------------- NURSING NOTES AND VITALS REVIEWED ---------------------------  Date / Time Roomed:  12/31/2021  9:14 PM  ED Bed Assignment:  10/10    The nursing notes within the ED encounter and vital signs as below have been reviewed. Patient Vitals for the past 24 hrs:   BP Temp Temp src Pulse Resp SpO2   12/31/21 2119 95/61 98.1 °F (36.7 °C) Oral 97 20 99 %       Oxygen Saturation Interpretation: Normal    ------------------------------------------ PROGRESS NOTES ------------------------------------------  Re-evaluation(s):  Time: 5067. Patients symptoms show no change  Repeat physical examination is not changed    Counseling:  I have spoken with the patient and discussed todays results, in addition to providing specific details for the plan of care and counseling regarding the diagnosis and prognosis. Their questions are answered at this time and they are agreeable with the plan of admission.    --------------------------------- ADDITIONAL PROVIDER NOTES ---------------------------------  Consultations:  0015. Spoke with Viri Hidalgo (Critical Care). No ICU beds available. Will attempt to transfer patient. 0127. Spoke with Dr. Thiago Dominguez (Critical Care). OK for transfer to MICU.  0144. Notified by  that Dr. Pasco Schlatter has accepted patient to [facility].  E's.     This patient's ED course included: a personal history and physicial examination, re-evaluation prior to disposition, multiple bedside re-evaluations, IV medications, cardiac monitoring and continuous pulse oximetry    This patient has remained hemodynamically stable during their ED course. Diagnosis:  1. Type 1 diabetes mellitus with ketoacidosis without coma ([facility]    2. COVID-19        Disposition:  Patient's disposition: Transfer to [facility]. E's. Patient's condition is stable. Please note that the withdrawal or failure to initiate urgent interventions for this patient would likely result in a life threatening deterioration or permanent disability. Accordingly this patient received 30 minutes of critical care time, excluding separately billable procedures. ATTENDING PROVIDER ATTESTATION:     I have personally performed and/or participated in the history, exam, medical decision making, and procedures and agree with all pertinent clinical information. I have also reviewed and agree with the past medical, family and social history unless otherwise noted. I have discussed this patient in detail with the resident, and provided the instruction and education regarding central venous catheter insertion. My findings/Plan: Central venous catheter inserted into the right femoral vein without complication under my direct supervision.          Bernabe Minaya, DO  01/01/22 3601       [facility], DO  01/01/22 9660       [address] [place] [address], DO  01/01/22 0622

## 2022-01-01 NOTE — PROGRESS NOTES
CRITICAL CARE PROGRESS NOTE      Ms Nohemi Soni is admitted with DKA, has COVID without hypoxemia. She needs Sotrovimab infusion and/or Paxlovid or Molnupiravir. None of these medications is available at this point in this institution.      Maggie Dominguez MD  Pulmonary and Critical Care Medicine

## 2022-01-01 NOTE — ED NOTES
Report to oncoming rn, aware of next bgl and insulin drip.   No further questions nor concerns at this time     Lola Cox, KIM  01/01/22 1564

## 2022-01-01 NOTE — PROGRESS NOTES
Pharmacy Consultation Note  (Antibiotic Dosing and Monitoring)    Initial consult date: 1/1/22  Consulting physician/provider: Dr. Aminata Art  Drug: Vancomycin  Indication: HAP    Age/  Gender Height Weight IBW  Allergy Information   31 y.o./female   134 lb (60.8 kg)     Ideal body weight: 54.7 kg (120 lb 9.5 oz)  Adjusted ideal body weight: 57.1 kg (125 lb 15.3 oz)   Cefepime and Toradol [ketorolac tromethamine]      Renal Function:  Recent Labs     01/01/22  0715 01/01/22  1130 01/01/22  1445   BUN 23* 22* 23*   CREATININE 4.6* 4.5* 4.5*       Intake/Output Summary (Last 24 hours) at 1/1/2022 1710  Last data filed at 1/1/2022 0909  Gross per 24 hour   Intake 10 ml   Output    Net 10 ml       Vancomycin Monitoring:  Trough:  No results for input(s): VANCOTROUGH in the last 72 hours. Random:  No results for input(s): VANCORANDOM in the last 72 hours. Vancomycin Administration Times:  Recent vancomycin administrations      No vancomycin IV orders with administrations found. Orders not given:          vancomycin (VANCOCIN) 1,250 mg in dextrose 5 % 250 mL IVPB    vancomycin (VANCOCIN) intermittent dosing (placeholder)                Assessment:  · Patient is a 36 y.o. female who has been initiated on vancomycin  · Estimated Creatinine Clearance: 16 mL/min (A) (based on SCr of 4.5 mg/dL (H)).   · To dose vancomycin, pharmacy will be utilizing dosing based off of levels due to patient requiring hemodialysis   · 1/1: Patient ESRD on HD    Plan:  · Vancomycin 1250 mg IV x 1 dose  · Random level with am labs on 1/2/22  · Will continue to monitor renal function   · Clinical pharmacy to follow      Stephanie Keys PharmD, BCPS 1/1/2022 5:11 PM

## 2022-01-01 NOTE — CONSULTS
Assessment and Plan    Insulin dependent diabetes with DKA  --insulin gtt per DKA protocol   --Q 4 hour BMP, mag and phos  --IVF    COVID  --On RA so not a candidate for dexamethasone of biologics   --can take tylenol for body aches    ESRD on HD  --transitioned from PD to HD a few months ago  --HD per nephrology    History of Present Illness: This is a 36year old female with a past medical history as listed below who is well known to our service for multiple admissions for DKA. She presented to the emergency department stating that her whole house has covid and her symptoms began a few days ago. She has had nausea, vomiting, diarrhea and body aches. She is unvaccinated.     Past Medical History:  Past Medical History:   Diagnosis Date    Acute congestive heart failure ([address]     BC (acute kidney injury) ([address] 10/01/2019    Cardiac arrest ([address] 02/15/2021    Cephalgia 10/09/2019    Chronic kidney disease     Depression     Diabetes mellitus ([address]     Diabetic gastroparesis associated with type 1 diabetes mellitus ([address] 12/17/2018    Diabetic ketoacidosis ([address] 08/27/2011    Diabetic ketoacidosis with coma associated with type 1 diabetes mellitus ([address] 06/26/2013    Diabetic polyneuropathy associated with type 1 diabetes mellitus ([address] 12/27/2020    Drug use complicating pregnancy in third trimester     Endocarditis 10/31/2020    ESRD (end stage renal disease) ([address] 09/29/2020    H/O cardiovascular stress test 08/27/2021    Lexiscan    Hemodialysis patient [facility])     History of blood transfusion 11/2019    Hyperlipidemia 10/08/2020    Hyperosmolar hyperglycemic state (HHS) ([address] 11/20/2020    Hypothyroidism 10/08/2020    Iron deficiency anemia 10/01/2019    MDRO (multiple drug resistant organisms) resistance     MRSA (methicillin resistant Staphylococcus aureus)     back wound abcess    Non compliance w medication regimen 03/30/2016    Other disorders of kidney and ureter     Pregnancy 03/30/2016    16 weeks Previous  delivery affecting pregnancy, antepartum 2017    Previous stillbirth or demise, antepartum 2016    Seizure [facility] 2020    Severe pre-eclampsia in third trimester 2016    Shock liver 02/15/2021    Valvular endocarditis 11/10/2020    This Diagnosis was added to the Problem List based on transcribed orders from Dr. Mima Rooney           Family History:   Family History   Problem Relation Age of Onset    Asthma Mother     Hypertension Mother     High Blood Pressure Mother     Diabetes Mother     Asthma Brother     High Blood Pressure Father        Allergies:         Cefepime and Toradol [ketorolac tromethamine]    Social history:  Social History     Socioeconomic History    Marital status: Single     Spouse name: Not on file    Number of children: 2    Years of education: Not on file    Highest education level: Not on file   Occupational History    Not on file   Tobacco Use    Smoking status: Former Smoker     Packs/day: 0.50     Years: 6.00     Pack years: 3.00     Types: Cigarettes     Quit date: 2021     Years since quittin.9    Smokeless tobacco: Never Used    Tobacco comment: vaper cig   Vaping Use    Vaping Use: Never used   Substance and Sexual Activity    Alcohol use: No    Drug use: Yes     Types: Opiates      Comment: documented prior history of opioid abuse    Sexual activity: Yes     Partners: Male   Other Topics Concern    Not on file   Social History Narrative    Not on file     Social Determinants of Health     Financial Resource Strain:     Difficulty of Paying Living Expenses: Not on file   Food Insecurity:     Worried About Running Out of Food in the Last Year: Not on file    [place] of Food in the Last Year: Not on file   Transportation Needs:     Lack of Transportation (Medical): Not on file    Lack of Transportation (Non-Medical):  Not on file   Physical Activity:     Days of Exercise per Week: Not on file    Minutes of Exercise per Session: Not on file UNIT/ML SOPN, INJECT 3 UNITS WITH MEALS   SLIDING SCALE. MAX 30U/DAY, Disp: , Rfl:     pantoprazole (PROTONIX) 40 MG tablet, Take 1 tablet by mouth 2 times daily, Disp: 30 tablet, Rfl: 5    docusate sodium (COLACE) 100 MG capsule, Take 1 capsule by mouth daily, Disp: 30 capsule, Rfl: 0    sennosides-docusate sodium (SENOKOT-S) 8.6-50 MG tablet, Take 2 tablets by mouth nightly as needed for Constipation (Patient not taking: Reported on 12/17/2021), Disp: , Rfl:     insulin glargine (LANTUS) 100 UNIT/ML injection vial, Inject 6 Units into the skin 2 times daily, Disp: 10 mL, Rfl: 3    vitamin D (ERGOCALCIFEROL) 1.25 MG (43224 UT) CAPS capsule, Take 1 capsule by mouth once a week, Disp: 5 capsule, Rfl: 0    mirtazapine (REMERON) 15 MG tablet, Take 15 mg by mouth nightly, Disp: , Rfl:     polyethylene glycol (GLYCOLAX) 17 g packet, Take 17 g by mouth daily as needed for Constipation (Patient not taking: Reported on 12/17/2021), Disp: , Rfl:     ARIPiprazole (ABILIFY) 5 MG tablet, Take 5 mg by mouth nightly, Disp: , Rfl:     levothyroxine (SYNTHROID) 75 MCG tablet, TAKE 1 TABLET BY MOUTH IN THE MORNING BEFORE BREAKFAST, Disp: 60 tablet, Rfl: 0    rOPINIRole (REQUIP) 0.25 MG tablet, Take 0.25 mg by mouth nightly, Disp: , Rfl:     Review of Systems:   General: denies weight gain, positive for appetite, but negative for fever, chills, night sweats. HEENT: denies headaches, dizziness, head trauma, visual changes, eye pain, tinnitus, nosebleeds, hoarseness or throat pain    Respiratory: denies chest pain, positive for dyspnea, but negative for cough and hemoptysis  Cardiovascular: denies orthopnea, paroxysmal nocturnal dyspnea, leg swelling, and previous heart attack. Gastrointestinal: denies pain, positive for nausea vomiting, and diarrhea, but negative for constipation, melena or bleeding.   Genitourinary: denies hematuria, frequency, urgency or dysuria  Neurology: denies syncope, seizures, paralysis, paraesthesia Endocrine: denies polyuria, polydipsia, skin or hair changes, and heat or cold intolerance  Musculoskeletal: denies joint pain, swelling, arthritis or myalgia  Hematologic: denies bleeding, adenopathy and easy bruising  Skin: denies rashes and skin discoloration  Psychiatry: denies depression    Physical Exam:   Vital Signs:  /65   Pulse 92   Temp 97.5 °F (36.4 °C) (Oral)   Resp 19   Wt 134 lb (60.8 kg)   SpO2 100%   BMI 23.00 kg/m²     Input/Output:  No intake/output data recorded. Oxygen requirements: RA    Ventilator Information:  Vent Information  SpO2: 100 %    General appearance: Well developed, not in pain or distress, in no respiratory distress    HEENT: Atraumatic/normocephalic, EOMI, MELI, pharynx clear, moist mucosa, redness of the uvula appreciated, fruity breath   Neck: Supple, no jugular venous distension, lymphadenopathy, thyromegaly or carotid bruits  Chest: Equal normal breath sounds, no wheezing, no crackles and no tenderness over ribs   Cardiovascular: Normal S1 , S2, regular rate and rhythm, no murmur, rub or gallop  Abdomen: Normal sounds present, soft, lax with no tenderness, no hepatosplenomegaly, and no masses  Extremities: No edema. Pulses are equally present. Skin: intact, no rashes, right chest temporary HD catheter   Neurologic: Alert and oriented x 3, No focal deficit     Investigations:  Labs, radiological imaging and cardiac work up were reviewed    Electronically signed by HEBER Funk CNP on 1/1/2022 at 6:09 AM      ICU STAFF PHYSICIAN NOTE OF PERSONAL INVOLVEMENT IN CARE  As the attending physician, I certify that I personally reviewed the patient's history and personally examined the patient to confirm the physical findings described above, and that I reviewed the relevant imaging studies and available reports.   I also discussed the differential diagnosis and all of the proposed management plans with the patient and individuals accompanying the patient to this visit. They had the opportunity to ask questions about the proposed management plans and to have those questions answered. I saw and evaluated the patient. I agree with the findings and the plan of care as documented in [place] APRN-CNP's note    This patient has a high probability of sudden, clinically significant deterioration, which requires the highest level of physician preparedness to intervene urgently. I managed/supervised life or organ supporting interventions that required frequent physician assessment. I devoted my full attention to the direct care of this patient for the amount of time indicated below. Time I spent with family or surrogate(s) is included only if the patient was incapable of providing the necessary information or participating in medical decision-making. Time devoted to teaching and to any procedures I billed separately is not included.   Critical Care Time: 30 minutes    Oliver Roberts MD  Pulmonary and Critical Care Medicine

## 2022-01-01 NOTE — ED NOTES
IV fluids of 1 L of NS that was paused due to loss of IV access, was resumed in central line.            Kurtis Dan RN  01/01/22 2878

## 2022-01-01 NOTE — ED NOTES
As FYI to DR. PORTER Bed: 10  Expected date:   Expected time:   Means of arrival:   Comments:  Howie Rodriguez RN  12/31/21 2117

## 2022-01-01 NOTE — ED NOTES
Spoke to Dr. Tristan Brady regarding patient's temp. of 95 and blood pressure of 88/50. Gave patient another blanket per their instructions.      Jhonny Rouse RN  01/01/22 0086

## 2022-01-02 LAB
ALBUMIN SERPL-MCNC: 2 G/DL (ref 3.5–5.2)
ALP BLD-CCNC: 119 U/L (ref 35–104)
ALT SERPL-CCNC: 9 U/L (ref 0–32)
ANION GAP SERPL CALCULATED.3IONS-SCNC: 11 MMOL/L (ref 7–16)
ANION GAP SERPL CALCULATED.3IONS-SCNC: 12 MMOL/L (ref 7–16)
ANION GAP SERPL CALCULATED.3IONS-SCNC: 8 MMOL/L (ref 7–16)
AST SERPL-CCNC: 33 U/L (ref 0–31)
BASOPHILS ABSOLUTE: 0.02 E9/L (ref 0–0.2)
BASOPHILS RELATIVE PERCENT: 0.6 % (ref 0–2)
BILIRUB SERPL-MCNC: <0.2 MG/DL (ref 0–1.2)
BUN BLDV-MCNC: 10 MG/DL (ref 6–20)
BUN BLDV-MCNC: 20 MG/DL (ref 6–20)
BUN BLDV-MCNC: 21 MG/DL (ref 6–20)
CALCIUM IONIZED: 0.92 MMOL/L (ref 1.15–1.33)
CALCIUM SERPL-MCNC: 6.3 MG/DL (ref 8.6–10.2)
CALCIUM SERPL-MCNC: 6.4 MG/DL (ref 8.6–10.2)
CALCIUM SERPL-MCNC: 6.6 MG/DL (ref 8.6–10.2)
CHLORIDE BLD-SCNC: 94 MMOL/L (ref 98–107)
CHLORIDE BLD-SCNC: 96 MMOL/L (ref 98–107)
CHLORIDE BLD-SCNC: 97 MMOL/L (ref 98–107)
CO2: 21 MMOL/L (ref 22–29)
CO2: 22 MMOL/L (ref 22–29)
CO2: 26 MMOL/L (ref 22–29)
CREAT SERPL-MCNC: 3 MG/DL (ref 0.5–1)
CREAT SERPL-MCNC: 4.5 MG/DL (ref 0.5–1)
CREAT SERPL-MCNC: 4.5 MG/DL (ref 0.5–1)
EOSINOPHILS ABSOLUTE: 0.07 E9/L (ref 0.05–0.5)
EOSINOPHILS RELATIVE PERCENT: 2.2 % (ref 0–6)
GFR AFRICAN AMERICAN: 14
GFR AFRICAN AMERICAN: 14
GFR AFRICAN AMERICAN: 22
GFR NON-AFRICAN AMERICAN: 14 ML/MIN/1.73
GFR NON-AFRICAN AMERICAN: 14 ML/MIN/1.73
GFR NON-AFRICAN AMERICAN: 22 ML/MIN/1.73
GLUCOSE BLD-MCNC: 119 MG/DL (ref 74–99)
GLUCOSE BLD-MCNC: 202 MG/DL (ref 74–99)
GLUCOSE BLD-MCNC: 65 MG/DL (ref 74–99)
HCT VFR BLD CALC: 27.3 % (ref 34–48)
HEMOGLOBIN: 8.8 G/DL (ref 11.5–15.5)
IMMATURE GRANULOCYTES #: 0.01 E9/L
IMMATURE GRANULOCYTES %: 0.3 % (ref 0–5)
LYMPHOCYTES ABSOLUTE: 0.78 E9/L (ref 1.5–4)
LYMPHOCYTES RELATIVE PERCENT: 24.4 % (ref 20–42)
MAGNESIUM: 1.6 MG/DL (ref 1.6–2.6)
MAGNESIUM: 1.7 MG/DL (ref 1.6–2.6)
MCH RBC QN AUTO: 26.6 PG (ref 26–35)
MCHC RBC AUTO-ENTMCNC: 32.2 % (ref 32–34.5)
MCV RBC AUTO: 82.5 FL (ref 80–99.9)
METER GLUCOSE: 101 MG/DL (ref 74–99)
METER GLUCOSE: 105 MG/DL (ref 74–99)
METER GLUCOSE: 120 MG/DL (ref 74–99)
METER GLUCOSE: 120 MG/DL (ref 74–99)
METER GLUCOSE: 142 MG/DL (ref 74–99)
METER GLUCOSE: 193 MG/DL (ref 74–99)
METER GLUCOSE: 228 MG/DL (ref 74–99)
METER GLUCOSE: 297 MG/DL (ref 74–99)
METER GLUCOSE: 85 MG/DL (ref 74–99)
METER GLUCOSE: 99 MG/DL (ref 74–99)
METER GLUCOSE: <40 MG/DL (ref 74–99)
METER GLUCOSE: <40 MG/DL (ref 74–99)
MONOCYTES ABSOLUTE: 0.16 E9/L (ref 0.1–0.95)
MONOCYTES RELATIVE PERCENT: 5 % (ref 2–12)
NEUTROPHILS ABSOLUTE: 2.16 E9/L (ref 1.8–7.3)
NEUTROPHILS RELATIVE PERCENT: 67.5 % (ref 43–80)
PDW BLD-RTO: 17 FL (ref 11.5–15)
PHOSPHORUS: 2.6 MG/DL (ref 2.5–4.5)
PHOSPHORUS: 2.9 MG/DL (ref 2.5–4.5)
PLATELET # BLD: 130 E9/L (ref 130–450)
PMV BLD AUTO: 10.6 FL (ref 7–12)
POTASSIUM SERPL-SCNC: 2.5 MMOL/L (ref 3.5–5)
POTASSIUM SERPL-SCNC: 3.2 MMOL/L (ref 3.5–5)
POTASSIUM SERPL-SCNC: 3.6 MMOL/L (ref 3.5–5)
RBC # BLD: 3.31 E12/L (ref 3.5–5.5)
SODIUM BLD-SCNC: 128 MMOL/L (ref 132–146)
SODIUM BLD-SCNC: 129 MMOL/L (ref 132–146)
SODIUM BLD-SCNC: 130 MMOL/L (ref 132–146)
TOTAL PROTEIN: 4.1 G/DL (ref 6.4–8.3)
VANCOMYCIN RANDOM: 15.4 MCG/ML (ref 5–40)
WBC # BLD: 3.2 E9/L (ref 4.5–11.5)

## 2022-01-02 PROCEDURE — 2500000003 HC RX 250 WO HCPCS

## 2022-01-02 PROCEDURE — 2580000003 HC RX 258: Performed by: NURSE PRACTITIONER

## 2022-01-02 PROCEDURE — 80053 COMPREHEN METABOLIC PANEL: CPT

## 2022-01-02 PROCEDURE — 99232 SBSQ HOSP IP/OBS MODERATE 35: CPT | Performed by: INTERNAL MEDICINE

## 2022-01-02 PROCEDURE — 80048 BASIC METABOLIC PNL TOTAL CA: CPT

## 2022-01-02 PROCEDURE — 83735 ASSAY OF MAGNESIUM: CPT

## 2022-01-02 PROCEDURE — 6370000000 HC RX 637 (ALT 250 FOR IP): Performed by: INTERNAL MEDICINE

## 2022-01-02 PROCEDURE — 6360000002 HC RX W HCPCS

## 2022-01-02 PROCEDURE — 5A1D70Z PERFORMANCE OF URINARY FILTRATION, INTERMITTENT, LESS THAN 6 HOURS PER DAY: ICD-10-PCS | Performed by: INTERNAL MEDICINE

## 2022-01-02 PROCEDURE — 85025 COMPLETE CBC W/AUTO DIFF WBC: CPT

## 2022-01-02 PROCEDURE — 2580000003 HC RX 258

## 2022-01-02 PROCEDURE — 6360000002 HC RX W HCPCS: Performed by: NURSE PRACTITIONER

## 2022-01-02 PROCEDURE — 6370000000 HC RX 637 (ALT 250 FOR IP)

## 2022-01-02 PROCEDURE — 1200000000 HC SEMI PRIVATE

## 2022-01-02 PROCEDURE — 80202 ASSAY OF VANCOMYCIN: CPT

## 2022-01-02 PROCEDURE — 36415 COLL VENOUS BLD VENIPUNCTURE: CPT

## 2022-01-02 PROCEDURE — 2580000003 HC RX 258: Performed by: INTERNAL MEDICINE

## 2022-01-02 PROCEDURE — 84100 ASSAY OF PHOSPHORUS: CPT

## 2022-01-02 PROCEDURE — 36592 COLLECT BLOOD FROM PICC: CPT

## 2022-01-02 PROCEDURE — 82330 ASSAY OF CALCIUM: CPT

## 2022-01-02 PROCEDURE — 2500000003 HC RX 250 WO HCPCS: Performed by: INTERNAL MEDICINE

## 2022-01-02 PROCEDURE — 90935 HEMODIALYSIS ONE EVALUATION: CPT

## 2022-01-02 PROCEDURE — 6360000002 HC RX W HCPCS: Performed by: INTERNAL MEDICINE

## 2022-01-02 PROCEDURE — 82962 GLUCOSE BLOOD TEST: CPT

## 2022-01-02 RX ORDER — MORPHINE SULFATE 2 MG/ML
2 INJECTION, SOLUTION INTRAMUSCULAR; INTRAVENOUS ONCE
Status: COMPLETED | OUTPATIENT
Start: 2022-01-03 | End: 2022-01-03

## 2022-01-02 RX ORDER — MIDODRINE HYDROCHLORIDE 5 MG/1
10 TABLET ORAL EVERY 8 HOURS
Status: DISCONTINUED | OUTPATIENT
Start: 2022-01-02 | End: 2022-01-05 | Stop reason: HOSPADM

## 2022-01-02 RX ORDER — PROCHLORPERAZINE EDISYLATE 5 MG/ML
10 INJECTION INTRAMUSCULAR; INTRAVENOUS ONCE
Status: COMPLETED | OUTPATIENT
Start: 2022-01-03 | End: 2022-01-03

## 2022-01-02 RX ORDER — NICOTINE POLACRILEX 4 MG
15 LOZENGE BUCCAL PRN
Status: DISCONTINUED | OUTPATIENT
Start: 2022-01-02 | End: 2022-01-05 | Stop reason: HOSPADM

## 2022-01-02 RX ORDER — POTASSIUM CHLORIDE 29.8 MG/ML
INJECTION INTRAVENOUS
Status: COMPLETED
Start: 2022-01-02 | End: 2022-01-02

## 2022-01-02 RX ORDER — MAGNESIUM SULFATE 1 G/100ML
1000 INJECTION INTRAVENOUS ONCE
Status: COMPLETED | OUTPATIENT
Start: 2022-01-02 | End: 2022-01-02

## 2022-01-02 RX ORDER — DEXTROSE MONOHYDRATE 50 MG/ML
100 INJECTION, SOLUTION INTRAVENOUS PRN
Status: DISCONTINUED | OUTPATIENT
Start: 2022-01-02 | End: 2022-01-05 | Stop reason: HOSPADM

## 2022-01-02 RX ORDER — LACTOBACILLUS RHAMNOSUS GG 10B CELL
CAPSULE ORAL
Status: COMPLETED
Start: 2022-01-02 | End: 2022-01-02

## 2022-01-02 RX ORDER — POTASSIUM CHLORIDE 29.8 MG/ML
20 INJECTION INTRAVENOUS
Status: COMPLETED | OUTPATIENT
Start: 2022-01-02 | End: 2022-01-02

## 2022-01-02 RX ORDER — DEXTROSE MONOHYDRATE 25 G/50ML
12.5 INJECTION, SOLUTION INTRAVENOUS PRN
Status: DISCONTINUED | OUTPATIENT
Start: 2022-01-02 | End: 2022-01-05 | Stop reason: HOSPADM

## 2022-01-02 RX ORDER — LACTOBACILLUS RHAMNOSUS GG 10B CELL
1 CAPSULE ORAL DAILY
Status: DISCONTINUED | OUTPATIENT
Start: 2022-01-02 | End: 2022-01-05 | Stop reason: HOSPADM

## 2022-01-02 RX ORDER — INSULIN GLARGINE 100 [IU]/ML
6 INJECTION, SOLUTION SUBCUTANEOUS NIGHTLY
Status: DISCONTINUED | OUTPATIENT
Start: 2022-01-02 | End: 2022-01-05 | Stop reason: HOSPADM

## 2022-01-02 RX ADMIN — Medication 20 ML: at 09:51

## 2022-01-02 RX ADMIN — PIPERACILLIN SODIUM AND TAZOBACTAM SODIUM 3375 MG: 3; .375 INJECTION, POWDER, LYOPHILIZED, FOR SOLUTION INTRAVENOUS at 04:43

## 2022-01-02 RX ADMIN — DOXYCYCLINE 100 MG: 100 INJECTION, POWDER, LYOPHILIZED, FOR SOLUTION INTRAVENOUS at 17:04

## 2022-01-02 RX ADMIN — CALCIUM GLUCONATE 1000 MG: 98 INJECTION, SOLUTION INTRAVENOUS at 15:45

## 2022-01-02 RX ADMIN — DOXYCYCLINE 100 MG: 100 INJECTION, POWDER, LYOPHILIZED, FOR SOLUTION INTRAVENOUS at 04:44

## 2022-01-02 RX ADMIN — Medication 1 CAPSULE: at 15:01

## 2022-01-02 RX ADMIN — INSULIN LISPRO 2 UNITS: 100 INJECTION, SOLUTION INTRAVENOUS; SUBCUTANEOUS at 17:29

## 2022-01-02 RX ADMIN — PIPERACILLIN SODIUM AND TAZOBACTAM SODIUM 3375 MG: 3; .375 INJECTION, POWDER, LYOPHILIZED, FOR SOLUTION INTRAVENOUS at 21:15

## 2022-01-02 RX ADMIN — MAGNESIUM SULFATE 1000 MG: 1 INJECTION INTRAVENOUS at 15:05

## 2022-01-02 RX ADMIN — ONDANSETRON 4 MG: 2 INJECTION INTRAMUSCULAR; INTRAVENOUS at 21:32

## 2022-01-02 RX ADMIN — INSULIN LISPRO 3 UNITS: 100 INJECTION, SOLUTION INTRAVENOUS; SUBCUTANEOUS at 21:30

## 2022-01-02 RX ADMIN — MIDODRINE HYDROCHLORIDE 10 MG: 5 TABLET ORAL at 15:46

## 2022-01-02 RX ADMIN — Medication 15 G: at 08:57

## 2022-01-02 RX ADMIN — Medication 10 ML: at 21:23

## 2022-01-02 RX ADMIN — VANCOMYCIN HYDROCHLORIDE 750 MG: 5 INJECTION, POWDER, LYOPHILIZED, FOR SOLUTION INTRAVENOUS at 17:52

## 2022-01-02 RX ADMIN — HEPARIN SODIUM 5000 UNITS: 5000 INJECTION INTRAVENOUS; SUBCUTANEOUS at 21:15

## 2022-01-02 RX ADMIN — DEXTROSE MONOHYDRATE 12.5 G: 25 INJECTION, SOLUTION INTRAVENOUS at 09:20

## 2022-01-02 RX ADMIN — POTASSIUM CHLORIDE 20 MEQ: 29.8 INJECTION, SOLUTION INTRAVENOUS at 04:42

## 2022-01-02 RX ADMIN — POTASSIUM CHLORIDE 20 MEQ: 29.8 INJECTION, SOLUTION INTRAVENOUS at 03:22

## 2022-01-02 RX ADMIN — ACETAMINOPHEN 650 MG: 325 TABLET ORAL at 14:21

## 2022-01-02 RX ADMIN — HEPARIN SODIUM 5000 UNITS: 5000 INJECTION INTRAVENOUS; SUBCUTANEOUS at 06:23

## 2022-01-02 RX ADMIN — INSULIN GLARGINE 6 UNITS: 100 INJECTION, SOLUTION SUBCUTANEOUS at 03:22

## 2022-01-02 RX ADMIN — PIPERACILLIN SODIUM AND TAZOBACTAM SODIUM 3375 MG: 3; .375 INJECTION, POWDER, LYOPHILIZED, FOR SOLUTION INTRAVENOUS at 13:43

## 2022-01-02 RX ADMIN — HEPARIN SODIUM 5000 UNITS: 5000 INJECTION INTRAVENOUS; SUBCUTANEOUS at 14:26

## 2022-01-02 ASSESSMENT — PAIN SCALES - GENERAL
PAINLEVEL_OUTOF10: 0
PAINLEVEL_OUTOF10: 5
PAINLEVEL_OUTOF10: 0
PAINLEVEL_OUTOF10: 2
PAINLEVEL_OUTOF10: 0
PAINLEVEL_OUTOF10: 0
PAINLEVEL_OUTOF10: 2
PAINLEVEL_OUTOF10: 9
PAINLEVEL_OUTOF10: 0
PAINLEVEL_OUTOF10: 4
PAINLEVEL_OUTOF10: 0
PAINLEVEL_OUTOF10: 2

## 2022-01-02 ASSESSMENT — PAIN DESCRIPTION - PAIN TYPE: TYPE: ACUTE PAIN

## 2022-01-02 ASSESSMENT — PAIN DESCRIPTION - LOCATION: LOCATION: GENERALIZED

## 2022-01-02 NOTE — FLOWSHEET NOTE
01/02/22 1340   Vital Signs   /76   Temp 97 °F (36.1 °C)   Pulse 108   Resp 18   Weight 130 lb 11.7 oz (59.3 kg)   Weight Method Estimated   Percent Weight Change -2.31   Pain Assessment   Pain Assessment 0-10   Pain Level 0   Post-Hemodialysis Assessment   Post-Treatment Procedures Blood returned;Catheter capped, clamped and heparinized x 2 ports   Machine Disinfection Process Acid/Vinegar Clean;Exterior Machine Disinfection; Heat Disinfect   Rinseback Volume (ml) 300 ml   Total Liters Processed (l/min) 50.5 l/min   Dialyzer Clearance Clear   Duration of Treatment (minutes) 180 minutes   Hemodialysis Intake (ml) 300 ml   Hemodialysis Output (ml) 1700 ml   NET Removed (ml) 1400 ml   Tolerated Treatment Good   Bilateral Breath Sounds Clear   Edema None

## 2022-01-02 NOTE — PROGRESS NOTES
CRITICAL CARE PROGRESS NOTE    Pt with right dialysis line without dressing, no erythema or edema, no tenderness on insetion site and no discharge. Pt not immunized against COVID because of \"I don't know\". Ideally she should receive mAb if discharged.     Ish Taylor MD  Pulmonary and Critical Care Medicine

## 2022-01-02 NOTE — PROGRESS NOTES
Pharmacy Consultation Note  (Antibiotic Dosing and Monitoring)    Initial consult date: 1/1/22  Consulting physician/provider: Dr. Genesis Shafer  Drug: Vancomycin  Indication: HAP    Age/  Gender Height Weight IBW  Allergy Information   31 y.o./female   134 lb (60.8 kg)     Ideal body weight: 54.7 kg (120 lb 9.5 oz)  Adjusted ideal body weight: 57.1 kg (125 lb 14.1 oz)   Cefepime and Toradol [ketorolac tromethamine]      Renal Function:  Recent Labs     01/01/22  1445 01/01/22  1744 01/02/22  0157   BUN 23* 23* 21*   CREATININE 4.5* 4.6* 4.5*       Intake/Output Summary (Last 24 hours) at 1/2/2022 1105  Last data filed at 1/2/2022 0900  Gross per 24 hour   Intake 3294 ml   Output    Net 3294 ml       Vancomycin Monitoring:  Trough:  No results for input(s): VANCOTROUGH in the last 72 hours. Random:  No results for input(s): VANCORANDOM in the last 72 hours. Vancomycin Administration Times:    Recent vancomycin administrations                   vancomycin (VANCOCIN) 1,250 mg in dextrose 5 % 250 mL IVPB (mg) 1,250 mg New Bag 01/01/22 1853                Assessment:  · Patient is a 36 y.o. female who has been initiated on vancomycin  Estimated Creatinine Clearance: 16 mL/min (A) (based on SCr of 4.5 mg/dL (H)).   · To dose vancomycin, pharmacy will be utilizing dosing based off of levels due to patient requiring hemodialysis   · 1/1: Patient ESRD on HD  · 1/2: Patient for HD today     Plan:  · Vancomycin 750 mg IV x 1 dose after HD today  · Random level with am labs on 1/3/22  · Will continue to monitor renal function   · Clinical pharmacy to follow      Dion Kinney, PharmD, BCPS 1/2/2022 11:05 AM

## 2022-01-02 NOTE — CONSULTS
Department of Internal Medicine  Nephrology Nurse Practitioner Consult Note      Reason for Consult:  ESRD on HD  Requesting Physician:  Edgar Bowman, APRN-CNP    CHIEF COMPLAINT:  Not feeling well    History Obtained From:  patient, electronic medical record    HISTORY OF PRESENT ILLNESS:  Briefly, Miss Porsche Mariscal is a 36year-old female with a PMH of ESRD on home hemodialysis 5 days/wk, (initiated September 2021, Type I DM, poorly controlled with recurrent admissions for DKA, HTN, gastroparesis, ascites, infective endocarditis, cardiac arrest, hypothyroidism and depression. She was admitted on January 1, 2022, after presenting to the ED with complaints of \"not feeling well\". Patient states everyone in her house has Covid, she subsequently tested positive as well, she has not received her covid vaccine. She denies missing any hemodialysis treatments. We are consulted for dialysis management.     Past Medical History:        Diagnosis Date    Acute congestive heart failure ([address]     BC (acute kidney injury) ([address] 10/01/2019    Cardiac arrest ([address] 02/15/2021    Cephalgia 10/09/2019    Chronic kidney disease     Depression     Diabetes mellitus ([address]     Diabetic gastroparesis associated with type 1 diabetes mellitus ([address] 12/17/2018    Diabetic ketoacidosis ([address] 08/27/2011    Diabetic ketoacidosis with coma associated with type 1 diabetes mellitus ([address] 06/26/2013    Diabetic polyneuropathy associated with type 1 diabetes mellitus ([address] 12/27/2020    Drug use complicating pregnancy in third trimester     Endocarditis 10/31/2020    ESRD (end stage renal disease) ([address] 09/29/2020    H/O cardiovascular stress test 08/27/2021    Lexiscan    Hemodialysis patient [facility])     History of blood transfusion 11/2019    Hyperlipidemia 10/08/2020    Hyperosmolar hyperglycemic state (HHS) ([address] 11/20/2020    Hypothyroidism 10/08/2020    Iron deficiency anemia 10/01/2019    MDRO (multiple drug resistant organisms) resistance     MRSA (methicillin resistant Staphylococcus aureus)     back wound abcess    Non compliance w medication regimen 2016    Other disorders of kidney and ureter     Pregnancy 2016    16 weeks    Previous  delivery affecting pregnancy, antepartum 2017    Previous stillbirth or demise, antepartum 2016    Seizure ([address] 2020    Severe pre-eclampsia in third trimester 2016    Shock liver 02/15/2021    Valvular endocarditis 11/10/2020    This Diagnosis was added to the Problem List based on transcribed orders from Dr. Sukhi Mckeon        Past Surgical History:        Procedure Laterality Date    BACK SURGERY      abscess    CATHETER REMOVAL N/A 10/1/2021    PERITONEAL CATHETER REMOVAL performed by Daren Gavin MD at [address] [facility] [address]      x2   [address] [facility], LAPAROSCOPIC N/A 2019    CHOLECYSTECTOMY LAPAROSCOPIC performed by Natalia Cruz MD at [address] N/A 2018    COLONOSCOPY WITH BIOPSY performed by Evelyn Khan MD at [address] [facility] N/A 2018    COLONOSCOPY WITH BIOPSY performed by Ekta Landry MD at [address]  2012    EF 57%    ECHO COMPL W DOP COLOR FLOW  6/10/2013         EMBOLECTOMY N/A 2020    [address] [facility], [address] [facility], YULISSA -- REQS ROOM 3 performed by Mercedes Owens MD at [address] [facility] Left 2021    LEFT LEG INCISION AND DRAINAGE, DEBRIDEMENT, WOUND VAC APPLICATION performed by Carmen Calix DPM at [address] Left 2021    LEFT LEG DEBRIDEMENT BIOPSY POSS APPLICATION WOUND VAC performed by Carmen Calix DPM at [place] [address] N/A 2020    LAPAROSCOPIC INSERTION PERITONEAL DIALYSIS CATHETER performed by Annamaria Arevalo MD at [facility] [address] ESOPHAGOGASTRODUODENOSCOPY TRANSORAL DIAGNOSTIC N/A 2018    EGD ESOPHAGOGASTRODUODENOSCOPY performed by Katelynn Sanchez MD at [address] [place] [address] TRANSESOPHAGEAL ECHOCARDIOGRAM N/A 10/19/2020    TRANSESOPHAGEAL ECHOCARDIOGRAM WITH BUBBLE STUDY performed by Kody Gifford MD at [address] [place] [address] TUNNELED VENOUS PORT PLACEMENT  04/2018    UPPER GASTROINTESTINAL ENDOSCOPY  12/18/2018    EGD BIOPSY performed by [place] MD [facility] N/A 10/11/2019    EGD ESOPHAGOGASTRODUODENOSCOPY performed by Brayden Matt DO at [facility] N/A 7/14/2021    EGD BIOPSY performed by Suki Sands MD at [place] [address]     Current Medications:    Current Facility-Administered Medications: glucose (GLUTOSE) 40 % oral gel 15 g, 15 g, Oral, PRN  dextrose 50 % IV solution, 12.5 g, IntraVENous, PRN  glucagon (rDNA) injection 1 mg, 1 mg, IntraMUSCular, PRN  dextrose 5 % solution, 100 mL/hr, IntraVENous, PRN  insulin glargine (LANTUS) injection vial 6 Units, 6 Units, SubCUTAneous, Nightly  insulin lispro (HUMALOG) injection vial 0-12 Units, 0-12 Units, SubCUTAneous, TID WC  insulin lispro (HUMALOG) injection vial 0-6 Units, 0-6 Units, SubCUTAneous, Nightly  sodium chloride flush 0.9 % injection 5-40 mL, 5-40 mL, IntraVENous, 2 times per day  sodium chloride flush 0.9 % injection 10 mL, 10 mL, IntraVENous, PRN  0.9 % sodium chloride infusion, 25 mL, IntraVENous, PRN  ondansetron (ZOFRAN-ODT) disintegrating tablet 4 mg, 4 mg, Oral, Q8H PRN **OR** ondansetron (ZOFRAN) injection 4 mg, 4 mg, IntraVENous, Q6H PRN  polyethylene glycol (GLYCOLAX) packet 17 g, 17 g, Oral, Daily PRN  acetaminophen (TYLENOL) tablet 650 mg, 650 mg, Oral, Q6H PRN **OR** acetaminophen (TYLENOL) suppository 650 mg, 650 mg, Rectal, Q6H PRN  heparin (porcine) injection 5,000 Units, 5,000 Units, SubCUTAneous, 3 times per day  norepinephrine (LEVOPHED) 16 mg in dextrose 5 % 250 mL infusion, 2-100 mcg/min, IntraVENous, Continuous  piperacillin-tazobactam (ZOSYN) 3,375 mg in dextrose 5 % 50 mL IVPB extended infusion (mini-bag), 3,375 mg, IntraVENous, Q8H  doxycycline (VIBRAMYCIN) 100 mg in dextrose 5 % 100 mL IVPB, 100 mg, IntraVENous, Q12H  vancomycin (VANCOCIN) intermittent dosing (placeholder), , Other, RX Placeholder  Allergies:  Cefepime and Toradol [ketorolac tromethamine]    Social History:    TOBACCO:   reports that she quit smoking about 11 months ago. Her smoking use included cigarettes. She has a 3.00 pack-year smoking history. She has never used smokeless tobacco.  ETOH:   reports no history of alcohol use.     Family History:       Problem Relation Age of Onset    Asthma Mother     Hypertension Mother     High Blood Pressure Mother     Diabetes Mother     Asthma Brother     High Blood Pressure Father      REVIEW OF SYSTEMS:    CONSTITUTIONAL:  positive for  fatigue and malaise  EYES:  negative  HEENT:  negative for  hearing loss and epistaxis  RESPIRATORY:  positive for dyspnea  CARDIOVASCULAR:  negative for  chest pain, dyspnea  GASTROINTESTINAL:  positive for nausea and vomiting and abdominal pain  GENITOURINARY:  negative  INTEGUMENT/BREAST:  negative  HEMATOLOGIC/LYMPHATIC:  negative  ALLERGIC/IMMUNOLOGIC:  positive for drug reactions  ENDOCRINE:  positive for weight changes    MUSCULOSKELETAL:  negative  NEUROLOGICAL:  negative  PHYSICAL EXAM:      Vitals:    VITALS:  BP (!) 77/55   Pulse 105   Temp 96.7 °F (35.9 °C) (Axillary)   Resp 14   Wt 134 lb (60.8 kg)   SpO2 97%   BMI 23.00 kg/m²   24HR INTAKE/OUTPUT:    Intake/Output Summary (Last 24 hours) at 1/2/2022 2811  Last data filed at 1/2/2022 8597  Gross per 24 hour   Intake 2834 ml   Output    Net 2834 ml     Access: RIJ tunneled dialysis catheter  Constitutional:  Lethargic, but wakens to voice  HEENT:  PERRL, normocephalic, atraumatic  Respiratory:  CTA bilateral   Cardiovascular/Edema:  ST, RRR, no murmur gallop or rub  Gastrointestinal:  abd distended, c/o persistent abdominal pain  Neurologic:  Lethargic, awakens to voice, follows commands, no focal deficit  Skin:  Warm, dry, ecchymotic areas to abdomen  Other:      DATA:    CBC:   Lab Results   Component Value Date    WBC 3.2 01/02/2022    RBC 3.31 01/02/2022    HGB 8.8 01/02/2022    HCT 27.3 01/02/2022    MCV 82.5 01/02/2022    MCH 26.6 01/02/2022    MCHC 32.2 01/02/2022    RDW 17.0 01/02/2022     01/02/2022    MPV 10.6 01/02/2022     CMP:    Lab Results   Component Value Date     01/02/2022    K 2.5 01/02/2022    K 4.2 11/24/2021    CL 96 01/02/2022    CO2 22 01/02/2022    BUN 21 01/02/2022    CREATININE 4.5 01/02/2022    GFRAA 14 01/02/2022    LABGLOM 14 01/02/2022    GLUCOSE 119 01/02/2022    GLUCOSE 130 05/18/2012    PROT 5.1 12/31/2021    LABALBU 2.1 12/31/2021    LABALBU 4.1 05/18/2012    CALCIUM 6.3 01/02/2022    BILITOT <0.2 12/31/2021    ALKPHOS 125 12/31/2021    AST 33 12/31/2021    ALT 9 12/31/2021     Magnesium:    Lab Results   Component Value Date    MG 1.6 01/02/2022     Phosphorus:    Lab Results   Component Value Date    PHOS 2.6 01/02/2022     Radiology Review:      CXR December 31, 2021   No acute process.       Dialysis catheter in place.                 IMPRESSION/RECOMMENDATIONS:      Briefly, Miss Ralf Montgomery is a 36year-old female with a PMH of ESRD on home hemodialysis 5 days/wk, (initiated September 2021, Type I DM, poorly controlled with recurrent admissions for DKA, HTN, gastroparesis, ascites, infective endocarditis, cardiac arrest, hypothyroidism and depression. She was admitted on January 1, 2022, after presenting to the ED with complaints of \"not feeling well\". Patient states everyone in her house has Covid, she subsequently tested positive as well, she has not received her covid vaccine. She denies missing any hemodialysis treatments. We are consulted for dialysis management. 1. ESRD on home hemodialysis 5 days/wk via RIJ tunneled dialysis catheter.  HD initiated September 2021 after failed peritoneal dialysis with persistent hyperkalemia. For dialysis three times/wk MWF while inpatient. For dialysis tomorrow. 2. Hyponatremia, 2/2 decreased free water excretion from ESRD. 1L fluid restriction. 2. Hypokalemia, multifactorial - 2/2 GI losses from vomiting, diarrhea and poor oral intake in the setting of loop diuretic administration (takes Bumex at home). 3. HTN, on lopressor and amlodipine at home  4. Vitamin D deficiency, on ergocalciferol at home  5. MBD of CKD, on sevelamer (hold until phosphorus level obtained)  6. Anemia of CKD,  Hgb 8.8 mg/dL today, start epoetin alpha 3,000 unit 3 times/wk  7. Hemodynamic shock, requiring the use of vasopressor support to maintain MAP >65  8. Type I DM, poorly controlled with frequent readmissions for DKA  ------------------------------------------------------  9. Covid 19, unvaccinated  10. Acute hypoxic respiratory failure 2/2 viral pneumonia from covid 19  11. Restless leg syndrome, on ropinirole at home  12. Ascites, unknown etiology, s/p paracentesis September 2021. LFT within normal limits  13. Depression, on Abilify  14. Hypothyroidism, on levothyroxine   15. History of gastroparesis, on metoclopramide         Plan:    · Continue HD M-W-F while inpatient, for HD tomorrow  · Continue epoetin alpha 3,000 units 3 times/wk  · Obtain magnesium level  · Obtain phosphorus levels  · Obtain cbc in am  · Replace potassium  · Replace calcium  · Replace magnesium  · Obtain ionized calcium in am  · Monitor labs daily  · Monitor glucose levels      Thank you Robyn for allowing us to participate in the care of Ms.  [address] [facility]    Electronically signed by HEBER Simms CNP on 1/2/2022 at 7:14 AM

## 2022-01-02 NOTE — H&P
[address] [place], [place] [address]ist Group   HISTORY AND PHYSICAL EXAM      AUTHOR: Carlos A Jaffe MD PATIENT NAME: James Gama   DATE: 2022 MRN: [id number], : 1990   Primary Care Physician: Rea Dias MD     CHIEF COMPLAINT / REASON FOR ADMISSION:  Generalized weakness, Diarrheas, CoVID19 expsoure    HPI:   This is a 36 y.o. female  has a past medical history of ESRD, DM, Multiple DKA admissions and others as outlined below presented with  Generalized weakness, Diarrheas for last few days prior to arrival to the hospital.  Her entire house has covid and her symptoms began a few days ago with nausea, vomiting, diarrhea and body aches. The patient was seen and examined at bedside, appears alert and awake with no acute distress and is able to answer simple  questions.  On direct questioning, patient denied any  resting ongoing chest pain, resting SOB, hemoptysis, productive cough, fever, ongoing palpitation, active abdominal pain, hematemesis, rectal bleeding, diana, hematuria, any other  and GI complaints and any new focal neuro deficits   ROS:  Pertinent positives and negatives are noted in the HPI, all other systems are reviewed and negative    PMH:  Past Medical History:   Diagnosis Date    Acute congestive heart failure ([address]     BC (acute kidney injury) ([address] 10/01/2019    Cardiac arrest ([address] 02/15/2021    Cephalgia 10/09/2019    Chronic kidney disease     Depression     Diabetes mellitus ([address]     Diabetic gastroparesis associated with type 1 diabetes mellitus ([address] 2018    Diabetic ketoacidosis ([address] 2011    Diabetic ketoacidosis with coma associated with type 1 diabetes mellitus ([address] 2013    Diabetic polyneuropathy associated with type 1 diabetes mellitus ([address] 2020    Drug use complicating pregnancy in third trimester     Endocarditis 10/31/2020    ESRD (end stage renal disease) ([address] 2020    H/O cardiovascular stress test 2021    Lexiscan    [facility]od[facility]     History of blood transfusion 2019    Hyperlipidemia 10/08/2020    Hyperosmolar hyperglycemic state (HHS) ([facility] 2020    Hypothyroidism 10/08/2020    Iron deficiency anemia 10/01/2019    MDRO (multiple drug resistant organisms) resistance     MRSA (methicillin resistant Staphylococcus aureus)     back wound abcess    Non compliance w medication regimen 2016    Other disorders of kidney and ureter     Pregnancy 2016    16 weeks    Previous  delivery affecting pregnancy, antepartum 2017    Previous stillbirth or demise, antepartum 2016    Seizure ([facility] 2020    Severe pre-eclampsia in third trimester 2016    Shock liver 02/15/2021    Valvular endocarditis 11/10/2020    This Diagnosis was added to the Problem List based on transcribed orders from Dr. Linda Woody          Surgical History:  Past Surgical History:   Procedure Laterality Date    BACK SURGERY      abscess    CATHETER REMOVAL N/A 10/1/2021    PERITONEAL CATHETER REMOVAL performed by Miguel Ángel Horton MD at [facility]      x2   [address] [facility], LAPAROSCOPIC N/A 2019    CHOLECYSTECTOMY LAPAROSCOPIC performed by Mark Mclaughlin MD at [place] [address] N/A 2018    COLONOSCOPY WITH BIOPSY performed by Colby Emmanuel MD at [facility] N/A 2018    COLONOSCOPY WITH BIOPSY performed by Norberto Shaikh MD at [address] [place] [address] ECHO COMPL W [address]  2012    EF 57%    ECHO COMPL W DOP COLOR FLOW  6/10/2013         EMBOLECTOMY N/A 2020    ANGIOVAC, VEGECTOMY, YULISSA -- REQS ROOM 3 [facility]    LEFT LEG INCISION AND DRAINAGE, DEBRIDEMENT, WOUND VAC APPLICATION performed by Vivienne Maier DPM at [address] Left 2021    LEFT LEG DEBRIDEMENT BIOPSY POSS APPLICATION WOUND VAC performed by Ollie Burrows MD   vitamin D (ERGOCALCIFEROL) 1.25 MG (03065 UT) CAPS capsule Take 1 capsule by mouth once a week 9/3/21   Ollie Burrows MD   mirtazapine (REMERON) 15 MG tablet Take 15 mg by mouth nightly    Historical Provider, MD   polyethylene glycol (GLYCOLAX) 17 g packet Take 17 g by mouth daily as needed for Constipation  Patient not taking: Reported on 12/17/2021    Historical Provider, MD   ARIPiprazole (ABILIFY) 5 MG tablet Take 5 mg by mouth nightly 4/18/21   Historical Provider, MD   levothyroxine (SYNTHROID) 75 MCG tablet TAKE 1 TABLET BY MOUTH IN THE MORNING BEFORE BREAKFAST 4/19/21   Jacinda Littlejohn DO   rOPINIRole (REQUIP) 0.25 MG tablet Take 0.25 mg by mouth nightly    Historical Provider, MD       Allergies:    Cefepime and Toradol [ketorolac tromethamine]    Social History:    reports that she quit smoking about 11 months ago. Her smoking use included cigarettes. She has a 3.00 pack-year smoking history. She has never used smokeless tobacco. She reports current drug use. Drug: Opiates . She reports that she does not drink alcohol. Family History:   family history includes Asthma in her brother and mother; Diabetes in her mother; High Blood Pressure in her father and mother; Hypertension in her mother. PHYSICAL EXAM:  Vitals:  BP (!) 91/57   Pulse 90   Temp 95.7 °F (35.4 °C) (Rectal)   Resp 14   Wt 134 lb (60.8 kg)   SpO2 94%   BMI 23.00 kg/m²   GENERAL: No acute distress, Alert and awake, Afebrile, Appears tired and weak otherwise hemodynamically stable at present. HEENT: PERRLA, no icterus. OP clear and no exudates. NECK: Supple  no carotid/ophthalmic bruits, JVD None. RESPIRATORY:  Bilateral equal vesicular breath sound with no wheezing. Lung bases are clear. HEART: No tachycardia at bedside and regular rhythm. Normal S1 and S2, No S3 or S4 is audible. No pulsation, thrills, murmur or friction rubs. ABDOMEN: Soft, nondistended, nontender.  No hepatomegaly or splenomegaly. No CVA tenderness on the both sides. Bowel sound is present. EXTREMITIES: All peripheral pulses are present. No calf tenderness or swelling. No pedal edema is present. Xochitl Franklin NEUROLOGY: Alert and awake. No new focal neuro deficit. Bilateral Pupil is equal and reactive to light. CN-ii-xii otherwise grossly intact. Motor and Sensory: Grossly Intact bilaterally with no new focal signs   LABS:  Recent Labs     12/31/21  2235   WBC 4.9   RBC 3.84   HGB 10.4*   HCT 35.1   MCV 91.4   MCH 27.1   MCHC 29.6*   RDW 18.6*      MPV 10.5     Recent Labs     01/01/22  1130 01/01/22  1349 01/01/22  1445 01/01/22  1451 01/01/22  1620 01/01/22  1728 01/01/22  1744     --  134  --   --   --  134   K 2.7*  --  2.9*  --   --   --  2.3*   CL 95*  --  96*  --   --   --  97*   CO2 19*  --  23  --   --   --  24   BUN 22*  --  23*  --   --   --  23*   CREATININE 4.5*  --  4.5*  --   --   --  4.6*   GLUCOSE 409*   < > 277*   < > 251 199 218*   CALCIUM 7.0*  --  6.9*  --   --   --  6.7*    < > = values in this interval not displayed. No results for input(s): POCGLU in the last 72 hours.   Results for orders placed or performed during the hospital encounter of 12/31/21   COVID-19, Rapid    Specimen: Nasopharyngeal Swab   Result Value Ref Range    SARS-CoV-2, NAAT DETECTED (A) Not Detected   CBC auto differential   Result Value Ref Range    WBC 4.9 4.5 - 11.5 E9/L    RBC 3.84 3.50 - 5.50 E12/L    Hemoglobin 10.4 (L) 11.5 - 15.5 g/dL    Hematocrit 35.1 34.0 - 48.0 %    MCV 91.4 80.0 - 99.9 fL    MCH 27.1 26.0 - 35.0 pg    MCHC 29.6 (L) 32.0 - 34.5 %    RDW 18.6 (H) 11.5 - 15.0 fL    Platelets 911 848 - 268 E9/L    MPV 10.5 7.0 - 12.0 fL    Neutrophils % 67.5 43.0 - 80.0 %    Immature Granulocytes % 0.4 0.0 - 5.0 %    Lymphocytes % 24.4 20.0 - 42.0 %    Monocytes % 6.9 2.0 - 12.0 %    Eosinophils % 0.2 0.0 - 6.0 %    Basophils % 0.6 0.0 - 2.0 %    Neutrophils Absolute 3.32 1.80 - 7.30 E9/L    Immature Granulocytes # 0.02 E9/L    Lymphocytes Absolute 1.20 (L) 1.50 - 4.00 E9/L    Monocytes Absolute 0.34 0.10 - 0.95 E9/L    Eosinophils Absolute 0.01 (L) 0.05 - 0.50 E9/L    Basophils Absolute 0.03 0.00 - 0.20 E9/L   Comprehensive Metabolic Panel   Result Value Ref Range    Sodium 128 (L) 132 - 146 mmol/L    Potassium 5.0 3.5 - 5.0 mmol/L    Chloride 82 (L) 98 - 107 mmol/L    CO2 7 (LL) 22 - 29 mmol/L    Anion Gap 39 (H) 7 - 16 mmol/L    Glucose 874 (HH) 74 - 99 mg/dL    BUN 21 (H) 6 - 20 mg/dL    CREATININE 4.2 (H) 0.5 - 1.0 mg/dL    GFR Non-African American 15 >=60 mL/min/1.73    GFR African American 15     Calcium 7.2 (L) 8.6 - 10.2 mg/dL    Total Protein 5.1 (L) 6.4 - 8.3 g/dL    Albumin 2.1 (L) 3.5 - 5.2 g/dL    Total Bilirubin <0.2 0.0 - 1.2 mg/dL    Alkaline Phosphatase 125 (H) 35 - 104 U/L    ALT 9 0 - 32 U/L    AST 33 (H) 0 - 31 U/L   Troponin   Result Value Ref Range    Troponin, High Sensitivity 334 (H) 0 - 9 ng/L   Beta-Hydroxybutyrate   Result Value Ref Range    Beta-Hydroxybutyrate >4.50 (H) 0.02 - 0.27 mmol/L   PH, VENOUS   Result Value Ref Range    pH, Khurram 7.28 (L) 7.35 - 7.45   HCG, SERUM, QUALITATIVE   Result Value Ref Range    hCG Qual NEGATIVE NEGATIVE   Troponin   Result Value Ref Range    Troponin, High Sensitivity 321 (H) 0 - 9 ng/L   Troponin   Result Value Ref Range    Troponin, High Sensitivity 304 (H) 0 - 9 ng/L   Basic Metabolic Panel   Result Value Ref Range    Sodium 133 132 - 146 mmol/L    Potassium 3.0 (L) 3.5 - 5.0 mmol/L    Chloride 86 (L) 98 - 107 mmol/L    CO2 7 (LL) 22 - 29 mmol/L    Anion Gap 40 (H) 7 - 16 mmol/L    Glucose 845 (HH) 74 - 99 mg/dL    BUN 23 (H) 6 - 20 mg/dL    CREATININE 4.5 (H) 0.5 - 1.0 mg/dL    GFR Non-African American 14 >=60 mL/min/1.73    GFR African American 14     Calcium 6.9 (L) 8.6 - 10.2 mg/dL   Basic Metabolic Panel   Result Value Ref Range    Sodium 134 132 - 146 mmol/L    Potassium 2.9 (L) 3.5 - 5.0 mmol/L    Chloride 96 (L) 98 - 107 mmol/L    CO2 23 22 - 29 mmol/L Anion Gap 15 7 - 16 mmol/L    Glucose 277 (H) 74 - 99 mg/dL    BUN 23 (H) 6 - 20 mg/dL    CREATININE 4.5 (H) 0.5 - 1.0 mg/dL    GFR Non-African American 14 >=60 mL/min/1.73    GFR African American 14     Calcium 6.9 (L) 8.6 - 10.2 mg/dL   Magnesium   Result Value Ref Range    Magnesium 1.8 1.6 - 2.6 mg/dL   Phosphorus   Result Value Ref Range    Phosphorus 3.4 2.5 - 4.5 mg/dL   Basic Metabolic Panel   Result Value Ref Range    Sodium 134 132 - 146 mmol/L    Potassium 2.3 (LL) 3.5 - 5.0 mmol/L    Chloride 97 (L) 98 - 107 mmol/L    CO2 24 22 - 29 mmol/L    Anion Gap 13 7 - 16 mmol/L    Glucose 218 (H) 74 - 99 mg/dL    BUN 23 (H) 6 - 20 mg/dL    CREATININE 4.6 (H) 0.5 - 1.0 mg/dL    GFR Non-African American 14 >=60 mL/min/1.73    GFR African American 14     Calcium 6.7 (L) 8.6 - 10.2 mg/dL   Magnesium   Result Value Ref Range    Magnesium 1.6 1.6 - 2.6 mg/dL   Phosphorus   Result Value Ref Range    Phosphorus 2.6 2.5 - 4.5 mg/dL   Magnesium   Result Value Ref Range    Magnesium 2.0 1.6 - 2.6 mg/dL   Phosphorus   Result Value Ref Range    Phosphorus 4.2 2.5 - 4.5 mg/dL   Basic Metabolic Panel   Result Value Ref Range    Sodium 134 132 - 146 mmol/L    Potassium 2.8 (L) 3.5 - 5.0 mmol/L    Chloride 90 (L) 98 - 107 mmol/L    CO2 11 (L) 22 - 29 mmol/L    Anion Gap 33 (H) 7 - 16 mmol/L    Glucose 688 (HH) 74 - 99 mg/dL    BUN 23 (H) 6 - 20 mg/dL    CREATININE 4.5 (H) 0.5 - 1.0 mg/dL    GFR Non-African American 14 >=60 mL/min/1.73    GFR African American 14     Calcium 7.1 (L) 8.6 - 10.2 mg/dL   Basic metabolic panel   Result Value Ref Range    Sodium 135 132 - 146 mmol/L    Potassium 2.9 (L) 3.5 - 5.0 mmol/L    Chloride 93 (L) 98 - 107 mmol/L    CO2 15 (L) 22 - 29 mmol/L    Anion Gap 27 (H) 7 - 16 mmol/L    Glucose 639 (HH) 74 - 99 mg/dL    BUN 23 (H) 6 - 20 mg/dL    CREATININE 4.6 (H) 0.5 - 1.0 mg/dL    GFR Non-African American 14 >=60 mL/min/1.73    GFR African American 14     Calcium 7.0 (L) 8.6 - 10.2 mg/dL   Basic Metabolic Panel   Result Value Ref Range    Sodium 134 132 - 146 mmol/L    Potassium 2.7 (LL) 3.5 - 5.0 mmol/L    Chloride 95 (L) 98 - 107 mmol/L    CO2 19 (L) 22 - 29 mmol/L    Anion Gap 20 (H) 7 - 16 mmol/L    Glucose 409 (H) 74 - 99 mg/dL    BUN 22 (H) 6 - 20 mg/dL    CREATININE 4.5 (H) 0.5 - 1.0 mg/dL    GFR Non-African American 14 >=60 mL/min/1.73    GFR African American 14     Calcium 7.0 (L) 8.6 - 10.2 mg/dL   Magnesium   Result Value Ref Range    Magnesium 1.8 1.6 - 2.6 mg/dL   Phosphorus   Result Value Ref Range    Phosphorus 3.6 2.5 - 4.5 mg/dL   POCT glucose   Result Value Ref Range    Glucose  mg/dL    QC OK? ok    POCT Glucose   Result Value Ref Range    Meter Glucose >500 (H) 74 - 99 mg/dL   POCT Glucose   Result Value Ref Range    Glucose  mg/dL    QC OK?  yes    POCT Glucose   Result Value Ref Range    Glucose 483 mg/dL    QC OK? y    POCT Glucose   Result Value Ref Range    Glucose 485 mg/dL    QC OK? y    POCT Glucose   Result Value Ref Range    Glucose 287 mg/dL   POCT Glucose   Result Value Ref Range    Glucose 241 mg/dL   POCT Glucose   Result Value Ref Range    Glucose 251 mg/dL   POCT Glucose   Result Value Ref Range    Glucose 199 mg/dL   POCT Glucose   Result Value Ref Range    Meter Glucose >500 (H) 74 - 99 mg/dL   POCT Glucose   Result Value Ref Range    Meter Glucose >500 (H) 74 - 99 mg/dL   POCT Glucose   Result Value Ref Range    Meter Glucose >500 (H) 74 - 99 mg/dL   POCT Glucose   Result Value Ref Range    Meter Glucose >500 (H) 74 - 99 mg/dL   POCT Glucose   Result Value Ref Range    Meter Glucose 483 (H) 74 - 99 mg/dL   POCT Glucose   Result Value Ref Range    Meter Glucose 485 (H) 74 - 99 mg/dL   POCT Glucose   Result Value Ref Range    Meter Glucose 396 (H) 74 - 99 mg/dL   POCT Glucose   Result Value Ref Range    Meter Glucose 413 (H) 74 - 99 mg/dL   POCT Glucose   Result Value Ref Range    Meter Glucose 287 (H) 74 - 99 mg/dL   POCT Glucose   Result Value Ref Range    Meter Glucose 241 (H) 74 - 99 mg/dL   POCT Glucose   Result Value Ref Range    Meter Glucose 251 (H) 74 - 99 mg/dL   POCT Glucose   Result Value Ref Range    Meter Glucose 199 (H) 74 - 99 mg/dL   POCT Glucose   Result Value Ref Range    Meter Glucose 170 (H) 74 - 99 mg/dL   EKG 12 Lead   Result Value Ref Range    Ventricular Rate 102 BPM    Atrial Rate 102 BPM    P-R Interval 120 ms    QRS Duration 90 ms    Q-T Interval 406 ms    QTc Calculation (Bazett) 529 ms    P Axis 68 degrees    R Axis 50 degrees    T Axis 2 degrees   EKG 12 Lead   Result Value Ref Range    Ventricular Rate 86 BPM    Atrial Rate 86 BPM    P-R Interval 132 ms    QRS Duration 88 ms    Q-T Interval 420 ms    QTc Calculation (Bazett) 502 ms    P Axis 54 degrees    R Axis 83 degrees    T Axis -39 degrees        Radiology: XR CHEST PORTABLE    Result Date: 12/31/2021  EXAMINATION: ONE XRAY VIEW OF THE CHEST 12/31/2021 10:02 pm COMPARISON: None. HISTORY: ORDERING SYSTEM PROVIDED HISTORY: cough, shortness of breath TECHNOLOGIST PROVIDED HISTORY: Reason for exam:->cough, shortness of breath FINDINGS: The lungs are without acute focal process. There is no effusion or pneumothorax. The cardiomediastinal silhouette is without acute process. The osseous structures are without acute process. No acute process. Dialysis catheter in place. ASSESSMENT:    Present on Admission:   DKA, type 1, not at goal [facility])    PLAN:  # DKA - most likely due to medication noncompliance vs concurrent stress  On admission blood sugar is >500  AG  high, Ph is low and HCO3 is low  S/p NS Bolus in ED and continue agressive IV hydration  Started with IV insulin + NS + @3 hourly BMP and Hourly sugar check  Will bridge to long active insulin once blood sugar is controlled and AG is closed, and will start diabetic diet accordingly  Will monitor K and replace accordingly.   Endocrine and Dietary/Nutritionist consult  # Hypotension - rule out septic shock   On IV levophed   Continue IV Zosyn and Vancomycin  # ESRD on PD> HD  BUN/Cr, Hb&Hct is stable  Renal dialysis diet with strict low K and low Na. Fluid restriction to 1 litre a day. Dialysis as per renal team/ Renal consult requested  # COVID19 positive   No fever, No SOB or hypoxia   Isolations   Will monitor clinically  # Rest of the chronic medical problems are stable and will be managed with appropriately with home medications, placed nursing communication order to verify home medications before giving them to the patient. # Diet: On PO Diet  # IVF's: No  # Fall Precaution: Yes  # Disposition: Home/primary residence   # Code Status: Full code by default  # DVT Prophylaxis : SC Heparin or SC Lovenox as on chart  The patient at bedside was counseled about clinical status, laboratory/imaging results, diagnoses, medication side effects, risk, and treatment plan, all questions were answered to patient's satisfaction and verbalized understanding      SIGNATURE: Semaj Menard MD PATIENT NAME: [address] [place] [address] Drive #: Hospitalist on call MRN: [id number]     Disclaimer: Portions of this note may have been generated using Dragon voice recognition software. Reasonable efforts were made to correct any dictation errors that resulted due to the programming of this software but some may still be present.

## 2022-01-02 NOTE — PLAN OF CARE
Problem: Airway Clearance - Ineffective  Goal: Achieve or maintain patent airway  Outcome: Met This Shift     Problem: Gas Exchange - Impaired  Goal: Absence of hypoxia  Outcome: Met This Shift  Goal: Promote optimal lung function  Outcome: Met This Shift     Problem: Breathing Pattern - Ineffective  Goal: Ability to achieve and maintain a regular respiratory rate  Outcome: Met This Shift     Problem:  Body Temperature -  Risk of, Imbalanced  Goal: Ability to maintain a body temperature within defined limits  Outcome: Met This Shift  Goal: Will regain or maintain usual level of consciousness  Outcome: Met This Shift  Goal: Complications related to the disease process, condition or treatment will be avoided or minimized  Outcome: Met This Shift     Problem: Risk for Fluid Volume Deficit  Goal: Maintain normal heart rhythm  Outcome: Met This Shift  Goal: Maintain normal serum potassium, sodium, calcium, phosphorus, and pH  Outcome: Met This Shift     Problem: Loneliness or Risk for Loneliness  Goal: Demonstrate positive use of time alone when socialization is not possible  Outcome: Met This Shift

## 2022-01-02 NOTE — PROGRESS NOTES
CRITICAL CARE PROGRESS NOTE    36year old person with PMH as described below admitted to ICU for management of     Uncontrolled insulin dependent diabetes with resolved DKA  --Continue Lantus and SSI     COVID-19 pneumonia  --On room air     ESRD on HD  --HD per nephrology    Septic shock, possible bacteremia from dialysis line  --Dialysis catheter was not dressed and open to the air, possible source of infection  --Off vasopressors    Can transfer to [facility]    /77   Pulse 109   Temp 97.1 °F (36.2 °C) (Axillary)   Resp 16   Ht 5' 4\" (1.626 m)   Wt 130 lb 11.7 oz (59.3 kg)   SpO2 96%   BMI 22.44 kg/m²   General: Awake, oriented to place, time and person  HEENT: No head lesions, PERRL, EOMI, mouth without lesions, no nasal lesions, no cervical adenopathy palpated  Respiratory: Lungs with decreased breath sounds bilaterally, no adventitious sounds auscultated, no accessory muscle use  CV: Regular rate, no murmurs, no JVD, no leg edema  Abdomen: Soft, non tender, + bowel sounds, no lesions  Skin: Hydrated, adequate turgor, no rash, capillary refill <2 seconds  Extremities: Muscular strength 4/5 in 4 limbs, moves 4 limbs spontaneously, distal pulses present  Neurology: Awake and alert, follows commands, moves 4 limbs on command and spontaneously,  neck is supple, no meningitic signs present.   Right HD cath no erythema of supuration    Last 3 CMP:  Recent Labs     12/31/21  2216 01/01/22  0325 01/01/22  1744 01/02/22  0157 01/02/22  0615   *   < > 134 129* 130*   K 5.0   < > 2.3* 2.5* 3.2*   CL 82*   < > 97* 96* 97*   CO2 7*   < > 24 22 21*   BUN 21*   < > 23* 21* 20   CREATININE 4.2*   < > 4.6* 4.5* 4.5*   GLUCOSE 874*   < > 218* 119* 65*   CALCIUM 7.2*   < > 6.7* 6.3* 6.4*   PROT 5.1*  --   --   --  4.1*   LABALBU 2.1*  --   --   --  2.0*   BILITOT <0.2  --   --   --  <0.2   ALKPHOS 125*  --   --   --  119*   AST 33*  --   --   --  33*   ALT 9  --   --   --  9    < > = values in this interval not displayed.       SARS-COV-2 biomarkers    Recent Labs     21  2216 22  0615   AST 33* 33*   ALT 9 9     Lab Results   Component Value Date    CHOL 95 2020    TRIG 82 2020    HDL 33 2020    LDLCALC 46 2020    LABVLDL 16 2020     Lab Results   Component Value Date/Time    VITD25 <5 (L) 2021 05:09 AM     Past Medical History:   Diagnosis Date    Acute congestive heart failure ([address]     BC (acute kidney injury) ([address] 10/01/2019    Cardiac arrest ([address] 02/15/2021    Cephalgia 10/09/2019    Chronic kidney disease     Depression     Diabetes mellitus ([address]     Diabetic gastroparesis associated with type 1 diabetes mellitus ([address] 2018    Diabetic ketoacidosis ([address] 2011    Diabetic ketoacidosis with coma associated with type 1 diabetes mellitus ([address] 2013    Diabetic polyneuropathy associated with type 1 diabetes mellitus ([address] 2020    Drug use complicating pregnancy in third trimester     Endocarditis 10/31/2020    ESRD (end stage renal disease) ([address] 2020    H/O cardiovascular stress test 2021    Lexiscan    Hemodialysis patient ([address]     History of blood transfusion 2019    Hyperlipidemia 10/08/2020    Hyperosmolar hyperglycemic state (HHS) ([address] 2020    Hypothyroidism 10/08/2020    Iron deficiency anemia 10/01/2019    MDRO (multiple drug resistant organisms) resistance     MRSA (methicillin resistant Staphylococcus aureus)     back wound abcess    Non compliance w medication regimen 2016    Other disorders of kidney and ureter     Pregnancy 2016    16 weeks    Previous  delivery affecting pregnancy, antepartum 2017    Previous stillbirth or demise, antepartum 2016    Seizure ([address] 2020    Severe pre-eclampsia in third trimester 2016    Shock liver 02/15/2021    Valvular endocarditis 11/10/2020    This Diagnosis was added to the Problem List based on transcribed orders from Dr. Valdo Aldana:   insulin glargine  6 Units SubCUTAneous Nightly    insulin lispro  0-12 Units SubCUTAneous TID WC    insulin lispro  0-6 Units SubCUTAneous Nightly    vancomycin  750 mg IntraVENous Once    [START ON 1/3/2022] epoetin nena-epbx  3,000 Units SubCUTAneous Once per day on Mon Wed Fri    midodrine  10 mg Oral Q8H    lactobacillus  1 capsule Oral Daily    sodium chloride flush  5-40 mL IntraVENous 2 times per day    heparin (porcine)  5,000 Units SubCUTAneous 3 times per day    piperacillin-tazobactam  3,375 mg IntraVENous Q8H    doxycycline (VIBRAMYCIN) IV  100 mg IntraVENous Q12H    vancomycin (VANCOCIN) intermittent dosing (placeholder)   Other RX Placeholder      dextrose      sodium chloride      norepinephrine 2 mcg/min (01/02/22 1300)     glucose, dextrose, glucagon (rDNA), dextrose, sodium chloride flush, sodium chloride, ondansetron **OR** ondansetron, polyethylene glycol, acetaminophen **OR** acetaminophen    OBJECTIVE:  Vitals:    01/02/22 1600   BP: 118/77   Pulse: 109   Resp: 16   Temp: 97.1 °F (36.2 °C)   SpO2: 96%        O2 Flow Rate (L/min): 2 L/min  O2 Device: Nasal cannula        LABS:  WBC   Date Value Ref Range Status   01/02/2022 3.2 (L) 4.5 - 11.5 E9/L Final   12/31/2021 4.9 4.5 - 11.5 E9/L Final   12/12/2021 5.8 4.5 - 11.5 E9/L Final     Hemoglobin   Date Value Ref Range Status   01/02/2022 8.8 (L) 11.5 - 15.5 g/dL Final   12/31/2021 10.4 (L) 11.5 - 15.5 g/dL Final   12/12/2021 8.3 (L) 11.5 - 15.5 g/dL Final     Hematocrit   Date Value Ref Range Status   01/02/2022 27.3 (L) 34.0 - 48.0 % Final   12/31/2021 35.1 34.0 - 48.0 % Final   12/12/2021 27.4 (L) 34.0 - 48.0 % Final     MCV   Date Value Ref Range Status   01/02/2022 82.5 80.0 - 99.9 fL Final   12/31/2021 91.4 80.0 - 99.9 fL Final   12/12/2021 89.5 80.0 - 99.9 fL Final     Platelets   Date Value Ref Range Status   01/02/2022 130 130 - 450 E9/L Final   12/31/2021 208 130 - 450 E9/L Final   12/12/2021 291 130 - 450 E9/L Final     Sodium   Date Value Ref Range Status   01/02/2022 130 (L) 132 - 146 mmol/L Final   01/02/2022 129 (L) 132 - 146 mmol/L Final   01/01/2022 134 132 - 146 mmol/L Final     Potassium   Date Value Ref Range Status   01/02/2022 3.2 (L) 3.5 - 5.0 mmol/L Final   01/02/2022 2.5 (LL) 3.5 - 5.0 mmol/L Final   01/01/2022 2.3 (LL) 3.5 - 5.0 mmol/L Final     Potassium reflex Magnesium   Date Value Ref Range Status   11/24/2021 4.2 3.5 - 5.0 mmol/L Final   11/22/2021 4.0 3.5 - 5.0 mmol/L Final   11/11/2021 4.8 3.5 - 5.0 mmol/L Final     Chloride   Date Value Ref Range Status   01/02/2022 97 (L) 98 - 107 mmol/L Final   01/02/2022 96 (L) 98 - 107 mmol/L Final   01/01/2022 97 (L) 98 - 107 mmol/L Final     CO2   Date Value Ref Range Status   01/02/2022 21 (L) 22 - 29 mmol/L Final   01/02/2022 22 22 - 29 mmol/L Final   01/01/2022 24 22 - 29 mmol/L Final     BUN   Date Value Ref Range Status   01/02/2022 20 6 - 20 mg/dL Final   01/02/2022 21 (H) 6 - 20 mg/dL Final   01/01/2022 23 (H) 6 - 20 mg/dL Final     CREATININE   Date Value Ref Range Status   01/02/2022 4.5 (H) 0.5 - 1.0 mg/dL Final   01/02/2022 4.5 (H) 0.5 - 1.0 mg/dL Final   01/01/2022 4.6 (H) 0.5 - 1.0 mg/dL Final     Glucose   Date Value Ref Range Status   01/02/2022 65 (L) 74 - 99 mg/dL Final   01/02/2022 119 (H) 74 - 99 mg/dL Final   01/01/2022 218 (H) 74 - 99 mg/dL Final   05/18/2012 130 (H) 70 - 110 mg/dL Final   04/26/2012 61 (L) 70 - 110 mg/dL Final   04/25/2012 196 (H) 70 - 110 mg/dL Final     Calcium   Date Value Ref Range Status   01/02/2022 6.4 (LL) 8.6 - 10.2 mg/dL Final   01/02/2022 6.3 (LL) 8.6 - 10.2 mg/dL Final   01/01/2022 6.7 (L) 8.6 - 10.2 mg/dL Final     Total Protein   Date Value Ref Range Status   01/02/2022 4.1 (L) 6.4 - 8.3 g/dL Final   12/31/2021 5.1 (L) 6.4 - 8.3 g/dL Final   12/07/2021 6.1 (L) 6.4 - 8.3 g/dL Final     Albumin   Date Value Ref Range Status   01/02/2022 2.0 (L) 3.5 - 5.2 g/dL Final   12/31/2021 2.1 (L) 3.5 - 5.2 g/dL Final   12/07/2021 3.1 (L) 3.5 - 5.2 g/dL Final   05/18/2012 4.1 3.2 - 4.8 g/dL Final   04/23/2012 4.0 3.2 - 4.8 g/dL Final   04/02/2012 4.8 3.2 - 4.8 g/dL Final     Total Bilirubin   Date Value Ref Range Status   01/02/2022 <0.2 0.0 - 1.2 mg/dL Final   12/31/2021 <0.2 0.0 - 1.2 mg/dL Final   12/07/2021 <0.2 0.0 - 1.2 mg/dL Final     Alkaline Phosphatase   Date Value Ref Range Status   01/02/2022 119 (H) 35 - 104 U/L Final   12/31/2021 125 (H) 35 - 104 U/L Final     Comment:     Specimen is moderately Hemolyzed. Result may be artificially decreased. 12/07/2021 129 (H) 35 - 104 U/L Final     AST   Date Value Ref Range Status   01/02/2022 33 (H) 0 - 31 U/L Final   12/31/2021 33 (H) 0 - 31 U/L Final     Comment:     Specimen is moderately Hemolyzed. Result may be artificially increased. 12/07/2021 5 0 - 31 U/L Final     ALT   Date Value Ref Range Status   01/02/2022 9 0 - 32 U/L Final   12/31/2021 9 0 - 32 U/L Final     Comment:     Specimen is moderately Hemolyzed. Result may be artificially increased. 12/07/2021 <5 0 - 32 U/L Final     GFR Non-   Date Value Ref Range Status   01/02/2022 14 >=60 mL/min/1.73 Final     Comment:     Chronic Kidney Disease: less than 60 ml/min/1.73 sq.m. Kidney Failure: less than 15 ml/min/1.73 sq.m. Results valid for patients 18 years and older. 01/02/2022 14 >=60 mL/min/1.73 Final     Comment:     Chronic Kidney Disease: less than 60 ml/min/1.73 sq.m. Kidney Failure: less than 15 ml/min/1.73 sq.m. Results valid for patients 18 years and older. 01/01/2022 14 >=60 mL/min/1.73 Final     Comment:     Chronic Kidney Disease: less than 60 ml/min/1.73 sq.m. Kidney Failure: less than 15 ml/min/1.73 sq.m. Results valid for patients 18 years and older.        GFR    Date Value Ref Range Status   01/02/2022 14  Final   01/02/2022 14  Final   01/01/2022 14  Final     Magnesium   Date Value Ref Range Status   01/02/2022 1.6 1.6 - 2.6 mg/dL Final   01/02/2022 1.7 1.6 - 2.6 mg/dL Final   01/01/2022 1.6 1.6 - 2.6 mg/dL Final     Phosphorus   Date Value Ref Range Status   01/02/2022 2.6 2.5 - 4.5 mg/dL Final   01/02/2022 2.9 2.5 - 4.5 mg/dL Final   01/01/2022 2.6 2.5 - 4.5 mg/dL Final     No results for input(s): PH, PO2, PCO2, HCO3, BE, O2SAT in the last 72 hours. RADIOLOGY:  XR CHEST PORTABLE   Final Result   No acute process. Dialysis catheter in place. PROBLEM LIST:  Active Problems:    DKA, type 1, not at goal [facility])  Resolved Problems:    * No resolved hospital problems.  *    ATTESTATION:  ICU Staff Physician note of personal involvement in Care  As the attending physician, I certify that I personally reviewed the patients history and personnally examined the patient to confirm the physical findings described above,  And that I reviewed the relevant imaging studies and available reports.  I also discussed the differential diagnosis and all of the proposed management plans with the patient and individuals accompanying the patient to this visit.  They had the opportunity to ask questions about the proposed management plans and to have those questions answered.     This patient has a high probability of sudden, clinically significant deterioration, which requires the highest level of physician preparedness to intervene urgently.  I managed/supervised life or organ supporting interventions that required frequent physician assessment.   I devoted my full attention to the direct care of this patient for the amount of time indicated below.  Time I spent with the family or surrogate(s) is included only if the patient was incapable of providing the necessary information or participating in medical decisions  Time devoted to teaching and to any procedures I billed separately is not included.     CRITICAL CARE TIME:  30 minutes    Jg Rene MD  Pulmonary and Critical Care Medicine

## 2022-01-03 PROBLEM — R65.21 SEPTIC SHOCK (HCC): Status: ACTIVE | Noted: 2021-09-28

## 2022-01-03 PROBLEM — U07.1 COVID-19 VIRUS INFECTION: Status: ACTIVE | Noted: 2022-01-03

## 2022-01-03 LAB
ALBUMIN SERPL-MCNC: 2 G/DL (ref 3.5–5.2)
ALP BLD-CCNC: 165 U/L (ref 35–104)
ALT SERPL-CCNC: 15 U/L (ref 0–32)
ANION GAP SERPL CALCULATED.3IONS-SCNC: 10 MMOL/L (ref 7–16)
AST SERPL-CCNC: 78 U/L (ref 0–31)
BASOPHILS ABSOLUTE: 0.01 E9/L (ref 0–0.2)
BASOPHILS RELATIVE PERCENT: 0.4 % (ref 0–2)
BILIRUB SERPL-MCNC: 0.2 MG/DL (ref 0–1.2)
BUN BLDV-MCNC: 12 MG/DL (ref 6–20)
CALCIUM SERPL-MCNC: 6.5 MG/DL (ref 8.6–10.2)
CHLORIDE BLD-SCNC: 95 MMOL/L (ref 98–107)
CO2: 22 MMOL/L (ref 22–29)
CREAT SERPL-MCNC: 3.5 MG/DL (ref 0.5–1)
EOSINOPHILS ABSOLUTE: 0 E9/L (ref 0.05–0.5)
EOSINOPHILS RELATIVE PERCENT: 0 % (ref 0–6)
GFR AFRICAN AMERICAN: 19
GFR NON-AFRICAN AMERICAN: 19 ML/MIN/1.73
GLUCOSE BLD-MCNC: 197 MG/DL (ref 74–99)
HCT VFR BLD CALC: 26.2 % (ref 34–48)
HEMOGLOBIN: 8.7 G/DL (ref 11.5–15.5)
IMMATURE GRANULOCYTES #: 0.01 E9/L
IMMATURE GRANULOCYTES %: 0.4 % (ref 0–5)
LYMPHOCYTES ABSOLUTE: 0.6 E9/L (ref 1.5–4)
LYMPHOCYTES RELATIVE PERCENT: 23.3 % (ref 20–42)
MAGNESIUM: 1.9 MG/DL (ref 1.6–2.6)
MCH RBC QN AUTO: 27 PG (ref 26–35)
MCHC RBC AUTO-ENTMCNC: 33.2 % (ref 32–34.5)
MCV RBC AUTO: 81.4 FL (ref 80–99.9)
METER GLUCOSE: 136 MG/DL (ref 74–99)
METER GLUCOSE: 202 MG/DL (ref 74–99)
METER GLUCOSE: 209 MG/DL (ref 74–99)
METER GLUCOSE: 319 MG/DL (ref 74–99)
MONOCYTES ABSOLUTE: 0.1 E9/L (ref 0.1–0.95)
MONOCYTES RELATIVE PERCENT: 3.9 % (ref 2–12)
MRSA CULTURE ONLY: NORMAL
NEUTROPHILS ABSOLUTE: 1.86 E9/L (ref 1.8–7.3)
NEUTROPHILS RELATIVE PERCENT: 72 % (ref 43–80)
PDW BLD-RTO: 17.3 FL (ref 11.5–15)
PHOSPHORUS: 2.3 MG/DL (ref 2.5–4.5)
PLATELET # BLD: 60 E9/L (ref 130–450)
PLATELET CONFIRMATION: NORMAL
PMV BLD AUTO: 11.1 FL (ref 7–12)
POTASSIUM SERPL-SCNC: 4.1 MMOL/L (ref 3.5–5)
RBC # BLD: 3.22 E12/L (ref 3.5–5.5)
REASON FOR REJECTION: NORMAL
REJECTED TEST: NORMAL
SODIUM BLD-SCNC: 127 MMOL/L (ref 132–146)
TOTAL PROTEIN: 4.3 G/DL (ref 6.4–8.3)
VANCOMYCIN RANDOM: 27.8 MCG/ML (ref 5–40)
WBC # BLD: 2.6 E9/L (ref 4.5–11.5)

## 2022-01-03 PROCEDURE — 6360000002 HC RX W HCPCS: Performed by: INTERNAL MEDICINE

## 2022-01-03 PROCEDURE — 82962 GLUCOSE BLOOD TEST: CPT

## 2022-01-03 PROCEDURE — 84100 ASSAY OF PHOSPHORUS: CPT

## 2022-01-03 PROCEDURE — 2580000003 HC RX 258: Performed by: INTERNAL MEDICINE

## 2022-01-03 PROCEDURE — 83735 ASSAY OF MAGNESIUM: CPT

## 2022-01-03 PROCEDURE — 36592 COLLECT BLOOD FROM PICC: CPT

## 2022-01-03 PROCEDURE — 80053 COMPREHEN METABOLIC PANEL: CPT

## 2022-01-03 PROCEDURE — 85025 COMPLETE CBC W/AUTO DIFF WBC: CPT

## 2022-01-03 PROCEDURE — 2500000003 HC RX 250 WO HCPCS: Performed by: INTERNAL MEDICINE

## 2022-01-03 PROCEDURE — P9047 ALBUMIN (HUMAN), 25%, 50ML: HCPCS | Performed by: INTERNAL MEDICINE

## 2022-01-03 PROCEDURE — 2580000003 HC RX 258

## 2022-01-03 PROCEDURE — 6370000000 HC RX 637 (ALT 250 FOR IP)

## 2022-01-03 PROCEDURE — 6360000002 HC RX W HCPCS

## 2022-01-03 PROCEDURE — 6370000000 HC RX 637 (ALT 250 FOR IP): Performed by: INTERNAL MEDICINE

## 2022-01-03 PROCEDURE — 36415 COLL VENOUS BLD VENIPUNCTURE: CPT

## 2022-01-03 PROCEDURE — 99232 SBSQ HOSP IP/OBS MODERATE 35: CPT | Performed by: INTERNAL MEDICINE

## 2022-01-03 PROCEDURE — 1200000000 HC SEMI PRIVATE

## 2022-01-03 PROCEDURE — 80202 ASSAY OF VANCOMYCIN: CPT

## 2022-01-03 RX ORDER — ALBUTEROL SULFATE 90 UG/1
4 AEROSOL, METERED RESPIRATORY (INHALATION) PRN
Status: DISCONTINUED | OUTPATIENT
Start: 2022-01-03 | End: 2022-01-05 | Stop reason: HOSPADM

## 2022-01-03 RX ORDER — SODIUM CHLORIDE 9 MG/ML
20 INJECTION, SOLUTION INTRAVENOUS CONTINUOUS PRN
Status: ACTIVE | OUTPATIENT
Start: 2022-01-03 | End: 2022-01-04

## 2022-01-03 RX ORDER — EPINEPHRINE 1 MG/ML
0.3 INJECTION, SOLUTION, CONCENTRATE INTRAVENOUS PRN
Status: DISCONTINUED | OUTPATIENT
Start: 2022-01-03 | End: 2022-01-05 | Stop reason: HOSPADM

## 2022-01-03 RX ORDER — DIPHENHYDRAMINE HYDROCHLORIDE 50 MG/ML
50 INJECTION INTRAMUSCULAR; INTRAVENOUS
Status: DISPENSED | OUTPATIENT
Start: 2022-01-03 | End: 2022-01-03

## 2022-01-03 RX ORDER — ONDANSETRON 2 MG/ML
8 INJECTION INTRAMUSCULAR; INTRAVENOUS
Status: ACTIVE | OUTPATIENT
Start: 2022-01-03 | End: 2022-01-03

## 2022-01-03 RX ORDER — SODIUM CHLORIDE 9 MG/ML
100 INJECTION, SOLUTION INTRAVENOUS CONTINUOUS PRN
Status: ACTIVE | OUTPATIENT
Start: 2022-01-03 | End: 2022-01-04

## 2022-01-03 RX ORDER — FENTANYL CITRATE 50 UG/ML
12.5 INJECTION, SOLUTION INTRAMUSCULAR; INTRAVENOUS
Status: DISCONTINUED | OUTPATIENT
Start: 2022-01-03 | End: 2022-01-04

## 2022-01-03 RX ORDER — ACETAMINOPHEN 325 MG/1
650 TABLET ORAL
Status: ACTIVE | OUTPATIENT
Start: 2022-01-03 | End: 2022-01-03

## 2022-01-03 RX ORDER — ALBUMIN (HUMAN) 12.5 G/50ML
25 SOLUTION INTRAVENOUS EVERY 8 HOURS
Status: COMPLETED | OUTPATIENT
Start: 2022-01-03 | End: 2022-01-05

## 2022-01-03 RX ADMIN — FENTANYL CITRATE 12.5 MCG: 0.05 INJECTION, SOLUTION INTRAMUSCULAR; INTRAVENOUS at 15:12

## 2022-01-03 RX ADMIN — PIPERACILLIN SODIUM AND TAZOBACTAM SODIUM 3375 MG: 3; .375 INJECTION, POWDER, LYOPHILIZED, FOR SOLUTION INTRAVENOUS at 23:18

## 2022-01-03 RX ADMIN — ALBUMIN (HUMAN) 25 G: 0.25 INJECTION, SOLUTION INTRAVENOUS at 17:49

## 2022-01-03 RX ADMIN — HEPARIN SODIUM 5000 UNITS: 5000 INJECTION INTRAVENOUS; SUBCUTANEOUS at 15:26

## 2022-01-03 RX ADMIN — MIDODRINE HYDROCHLORIDE 10 MG: 5 TABLET ORAL at 00:07

## 2022-01-03 RX ADMIN — ACETAMINOPHEN 650 MG: 325 TABLET ORAL at 11:06

## 2022-01-03 RX ADMIN — Medication 1 CAPSULE: at 11:04

## 2022-01-03 RX ADMIN — ONDANSETRON 4 MG: 2 INJECTION INTRAMUSCULAR; INTRAVENOUS at 12:05

## 2022-01-03 RX ADMIN — DOXYCYCLINE 100 MG: 100 INJECTION, POWDER, LYOPHILIZED, FOR SOLUTION INTRAVENOUS at 05:40

## 2022-01-03 RX ADMIN — INSULIN LISPRO 2 UNITS: 100 INJECTION, SOLUTION INTRAVENOUS; SUBCUTANEOUS at 23:20

## 2022-01-03 RX ADMIN — MIDODRINE HYDROCHLORIDE 10 MG: 5 TABLET ORAL at 07:03

## 2022-01-03 RX ADMIN — MIDODRINE HYDROCHLORIDE 10 MG: 5 TABLET ORAL at 15:12

## 2022-01-03 RX ADMIN — DOXYCYCLINE 100 MG: 100 INJECTION, POWDER, LYOPHILIZED, FOR SOLUTION INTRAVENOUS at 17:49

## 2022-01-03 RX ADMIN — Medication 10 ML: at 23:19

## 2022-01-03 RX ADMIN — MORPHINE SULFATE 2 MG: 2 INJECTION, SOLUTION INTRAMUSCULAR; INTRAVENOUS at 00:01

## 2022-01-03 RX ADMIN — ONDANSETRON 4 MG: 4 TABLET, ORALLY DISINTEGRATING ORAL at 17:49

## 2022-01-03 RX ADMIN — Medication 10 ML: at 10:47

## 2022-01-03 RX ADMIN — PROCHLORPERAZINE EDISYLATE 10 MG: 5 INJECTION INTRAMUSCULAR; INTRAVENOUS at 00:01

## 2022-01-03 RX ADMIN — PIPERACILLIN SODIUM AND TAZOBACTAM SODIUM 3375 MG: 3; .375 INJECTION, POWDER, LYOPHILIZED, FOR SOLUTION INTRAVENOUS at 05:41

## 2022-01-03 RX ADMIN — SODIUM CHLORIDE 500 MG: 9 INJECTION, SOLUTION INTRAVENOUS at 23:11

## 2022-01-03 RX ADMIN — HEPARIN SODIUM 5000 UNITS: 5000 INJECTION INTRAVENOUS; SUBCUTANEOUS at 05:41

## 2022-01-03 RX ADMIN — ONDANSETRON 4 MG: 2 INJECTION INTRAMUSCULAR; INTRAVENOUS at 23:58

## 2022-01-03 RX ADMIN — ALBUMIN (HUMAN) 25 G: 0.25 INJECTION, SOLUTION INTRAVENOUS at 11:04

## 2022-01-03 RX ADMIN — INSULIN LISPRO 4 UNITS: 100 INJECTION, SOLUTION INTRAVENOUS; SUBCUTANEOUS at 11:06

## 2022-01-03 RX ADMIN — FENTANYL CITRATE 12.5 MCG: 0.05 INJECTION, SOLUTION INTRAMUSCULAR; INTRAVENOUS at 22:55

## 2022-01-03 RX ADMIN — INSULIN LISPRO 8 UNITS: 100 INJECTION, SOLUTION INTRAVENOUS; SUBCUTANEOUS at 12:11

## 2022-01-03 RX ADMIN — INSULIN GLARGINE 6 UNITS: 100 INJECTION, SOLUTION SUBCUTANEOUS at 23:20

## 2022-01-03 RX ADMIN — FENTANYL CITRATE 12.5 MCG: 0.05 INJECTION, SOLUTION INTRAMUSCULAR; INTRAVENOUS at 18:43

## 2022-01-03 RX ADMIN — PIPERACILLIN SODIUM AND TAZOBACTAM SODIUM 3375 MG: 3; .375 INJECTION, POWDER, LYOPHILIZED, FOR SOLUTION INTRAVENOUS at 15:11

## 2022-01-03 ASSESSMENT — PAIN SCALES - GENERAL
PAINLEVEL_OUTOF10: 6
PAINLEVEL_OUTOF10: 6
PAINLEVEL_OUTOF10: 8
PAINLEVEL_OUTOF10: 9
PAINLEVEL_OUTOF10: 3
PAINLEVEL_OUTOF10: 6
PAINLEVEL_OUTOF10: 7
PAINLEVEL_OUTOF10: 9
PAINLEVEL_OUTOF10: 5
PAINLEVEL_OUTOF10: 9
PAINLEVEL_OUTOF10: 9

## 2022-01-03 ASSESSMENT — PAIN DESCRIPTION - PAIN TYPE
TYPE: CHRONIC PAIN
TYPE: CHRONIC PAIN

## 2022-01-03 ASSESSMENT — PAIN DESCRIPTION - LOCATION
LOCATION: GENERALIZED
LOCATION: GENERALIZED

## 2022-01-03 NOTE — PROGRESS NOTES
Department of Internal Medicine  Nephrology Progress Note      Reason for Consult:  ESRD on HD  Requesting Physician:  Arianne Rogel, APRN-CNP    CHIEF COMPLAINT:  Not feeling well    History Obtained From:  patient, electronic medical record    HISTORY OF PRESENT ILLNESS:  Briefly, Miss Erica Do is a 36year-old female with a PMH of ESRD on home hemodialysis 5 days/wk, (initiated September 2021, Type I DM, poorly controlled with recurrent admissions for DKA, HTN, gastroparesis, ascites, infective endocarditis, cardiac arrest, hypothyroidism and depression. She was admitted on January 1, 2022, after presenting to the ED with complaints of \"not feeling well\". Patient states everyone in her house has Covid, she subsequently tested positive as well, she has not received her covid vaccine. She denies missing any hemodialysis treatments. We are consulted for dialysis management.     1/3/22:  No new complaints    Current Medications:    Current Facility-Administered Medications: glucose (GLUTOSE) 40 % oral gel 15 g, 15 g, Oral, PRN  dextrose 50 % IV solution, 12.5 g, IntraVENous, PRN  glucagon (rDNA) injection 1 mg, 1 mg, IntraMUSCular, PRN  dextrose 5 % solution, 100 mL/hr, IntraVENous, PRN  insulin glargine (LANTUS) injection vial 6 Units, 6 Units, SubCUTAneous, Nightly  insulin lispro (HUMALOG) injection vial 0-12 Units, 0-12 Units, SubCUTAneous, TID WC  insulin lispro (HUMALOG) injection vial 0-6 Units, 0-6 Units, SubCUTAneous, Nightly  epoetin nena-epbx (RETACRIT) injection 3,000 Units, 3,000 Units, SubCUTAneous, Once per day on Mon Wed Fri  midodrine (PROAMATINE) tablet 10 mg, 10 mg, Oral, Q8H  lactobacillus (CULTURELLE) capsule 1 capsule, 1 capsule, Oral, Daily  sodium chloride flush 0.9 % injection 5-40 mL, 5-40 mL, IntraVENous, 2 times per day  sodium chloride flush 0.9 % injection 10 mL, 10 mL, IntraVENous, PRN  0.9 % sodium chloride infusion, 25 mL, IntraVENous, PRN  ondansetron (ZOFRAN-ODT) disintegrating tablet 4 mg, 4 mg, Oral, Q8H PRN **OR** ondansetron (ZOFRAN) injection 4 mg, 4 mg, IntraVENous, Q6H PRN  polyethylene glycol (GLYCOLAX) packet 17 g, 17 g, Oral, Daily PRN  acetaminophen (TYLENOL) tablet 650 mg, 650 mg, Oral, Q6H PRN **OR** acetaminophen (TYLENOL) suppository 650 mg, 650 mg, Rectal, Q6H PRN  heparin (porcine) injection 5,000 Units, 5,000 Units, SubCUTAneous, 3 times per day  piperacillin-tazobactam (ZOSYN) 3,375 mg in dextrose 5 % 50 mL IVPB extended infusion (mini-bag), 3,375 mg, IntraVENous, Q8H  doxycycline (VIBRAMYCIN) 100 mg in dextrose 5 % 100 mL IVPB, 100 mg, IntraVENous, Q12H  vancomycin (VANCOCIN) intermittent dosing (placeholder), , Other, RX Placeholder  Allergies:  Cefepime and Toradol [ketorolac tromethamine]    Social History:    TOBACCO:   reports that she quit smoking about 11 months ago. Her smoking use included cigarettes. She has a 3.00 pack-year smoking history. She has never used smokeless tobacco.  ETOH:   reports no history of alcohol use.     Family History:       Problem Relation Age of Onset   Nick Asthma Mother     Hypertension Mother     High Blood Pressure Mother     Diabetes Mother     Asthma Brother     High Blood Pressure Father      REVIEW OF SYSTEMS:    CONSTITUTIONAL:  positive for  fatigue and malaise  EYES:  negative  HEENT:  negative for  hearing loss and epistaxis  RESPIRATORY:  positive for dyspnea  CARDIOVASCULAR:  negative for  chest pain, dyspnea  GASTROINTESTINAL:  positive for nausea and vomiting and abdominal pain  GENITOURINARY:  negative  INTEGUMENT/BREAST:  negative  HEMATOLOGIC/LYMPHATIC:  negative  ALLERGIC/IMMUNOLOGIC:  positive for drug reactions  ENDOCRINE:  positive for weight changes    MUSCULOSKELETAL:  negative  NEUROLOGICAL:  negative  PHYSICAL EXAM:      Vitals:    VITALS:  /86   Pulse 106   Temp 98 °F (36.7 °C) (Infrared)   Resp 10   Ht 5' 4\" (1.626 m)   Wt 130 lb 11.7 oz (59.3 kg)   SpO2 96%   BMI 22.44 kg/m²   24HR INTAKE/OUTPUT:      Intake/Output Summary (Last 24 hours) at 1/3/2022 0900  Last data filed at 1/2/2022 2100  Gross per 24 hour   Intake 1200 ml   Output 3100 ml   Net -1900 ml     Access: RIJ tunneled dialysis catheter  Constitutional:  Lethargic, but wakens to voice  HEENT:  PERRL, normocephalic, atraumatic  Respiratory:  CTA bilateral   Cardiovascular/Edema:  ST, RRR, no murmur gallop or rub  Gastrointestinal:  abd distended, c/o persistent abdominal pain  Neurologic:  Lethargic, awakens to voice, follows commands, no focal deficit  Skin:  Warm, dry, ecchymotic areas to abdomen  Other:      DATA:    CBC:   Lab Results   Component Value Date    WBC 2.6 01/03/2022    RBC 3.22 01/03/2022    HGB 8.7 01/03/2022    HCT 26.2 01/03/2022    MCV 81.4 01/03/2022    MCH 27.0 01/03/2022    MCHC 33.2 01/03/2022    RDW 17.3 01/03/2022    PLT 60 01/03/2022    MPV 11.1 01/03/2022     CMP:    Lab Results   Component Value Date     01/03/2022    K 4.1 01/03/2022    K 4.2 11/24/2021    CL 95 01/03/2022    CO2 22 01/03/2022    BUN 12 01/03/2022    CREATININE 3.5 01/03/2022    GFRAA 19 01/03/2022    LABGLOM 19 01/03/2022    GLUCOSE 197 01/03/2022    GLUCOSE 130 05/18/2012    PROT 4.3 01/03/2022    LABALBU 2.0 01/03/2022    LABALBU 4.1 05/18/2012    CALCIUM 6.5 01/03/2022    BILITOT 0.2 01/03/2022    ALKPHOS 165 01/03/2022    AST 78 01/03/2022    ALT 15 01/03/2022     Magnesium:    Lab Results   Component Value Date    MG 1.9 01/03/2022     Phosphorus:    Lab Results   Component Value Date    PHOS 2.3 01/03/2022     Radiology Review:      CXR December 31, 2021   No acute process.       Dialysis catheter in place.                 IMPRESSION/RECOMMENDATIONS:      Briefly, Miss Nohemi Soni is a 36year-old female with a PMH of ESRD on home hemodialysis 5 days/wk, (initiated September 2021, Type I DM, poorly controlled with recurrent admissions for DKA, HTN, gastroparesis, ascites, infective endocarditis, cardiac arrest, hypothyroidism and depression. She was admitted on January 1, 2022, after presenting to the ED with complaints of \"not feeling well\". Patient states everyone in her house has Covid, she subsequently tested positive as well, she has not received her covid vaccine. She denies missing any hemodialysis treatments. We are consulted for dialysis management. 1. ESRD on home hemodialysis 5 days/wk via RIJ tunneled dialysis catheter. HD initiated September 2021 after failed peritoneal dialysis with persistent hyperkalemia. For dialysis three times/wk MWF while inpatient. For dialysis tomorrow. 2. Hyponatremia, 2/2 decreased free water excretion from ESRD. 1L fluid restriction. 2. Hypokalemia, multifactorial - 2/2 GI losses from vomiting, diarrhea and poor oral intake in the setting of loop diuretic administration (takes Bumex at home). 3. HTN, on lopressor and amlodipine at home  4. Vitamin D deficiency, on ergocalciferol at home  5. MBD of CKD, on sevelamer (hold until phosphorus level obtained)  6. Anemia of CKD,  Hgb 8.8 mg/dL today, start epoetin alpha 3,000 unit 3 times/wk  7. Hemodynamic shock, requiring the use of vasopressor support to maintain MAP >65  8. Type I DM, poorly controlled with frequent readmissions for DKA  ------------------------------------------------------  9. Covid 19, unvaccinated  10. Acute hypoxic respiratory failure 2/2 viral pneumonia from covid 19  11. Restless leg syndrome, on ropinirole at home  12. Ascites, unknown etiology, s/p paracentesis September 2021. LFT within normal limits  13. Depression, on Abilify  14. Hypothyroidism, on levothyroxine   15.  History of gastroparesis, on metoclopramide         Plan:    · Continue HD M-W-F while inpatient, for HD today  · Continue epoetin alpha 3,000 units 3 times/wk  · Start SPA 25 gm iv with each HD  · Obtain cbc in am  · Replace potassium prn  · Replace calcium prn  · Replace magnesium prn  · Monitor labs daily  · Monitor glucose levels      Thank you Alison Chaparro for allowing us to participate in the care of Ms.  [facility]    Electronically signed by Carlos Tyson MD on 1/3/2022 at 9:00 AM

## 2022-01-03 NOTE — FLOWSHEET NOTE
Reviewed Dr. Rebolledo Officer note from today with plan for HD today. Discussed over phone with him that patient received Dialysis yesterday - ok'd next treatment for tomorrow (Tues. 1/4) and will follow Tues/Thurs/Sat schedule emlissa inpatient (home hemo patient).

## 2022-01-03 NOTE — PROGRESS NOTES
Pharmacy Consultation Note  (Antibiotic Dosing and Monitoring)    Initial consult date: 1/1/22  Consulting physician/provider: Dr. Josephine Morin  Drug: Vancomycin  Indication: HAP    Age/  Gender Height Weight IBW  Allergy Information   31 y.o./female 5' 4\" (162.6 cm) 134 lb (60.8 kg)     Ideal body weight: 54.7 kg (120 lb 9.5 oz)  Adjusted ideal body weight: 56.5 kg (124 lb 10.4 oz)   Cefepime and Toradol [ketorolac tromethamine]      Renal Function:  Recent Labs     01/02/22  0615 01/02/22  1627 01/03/22  0555   BUN 20 10 12   CREATININE 4.5* 3.0* 3.5*       Intake/Output Summary (Last 24 hours) at 1/3/2022 1552  Last data filed at 1/3/2022 1423  Gross per 24 hour   Intake 750 ml   Output    Net 750 ml       Vancomycin Monitoring:  Trough:  No results for input(s): VANCOTROUGH in the last 72 hours. Random:    Recent Labs     01/02/22  1627 01/03/22  0555   VANCORANDOM 15.4 27.8       Vancomycin Administration Times:    Recent vancomycin administrations                   vancomycin (VANCOCIN) 1,250 mg in dextrose 5 % 250 mL IVPB (mg) 1,250 mg New Bag 01/01/22 1853                Assessment:  · Patient is a 36 y.o. female who has been initiated on vancomycin  Estimated Creatinine Clearance: 20 mL/min (A) (based on SCr of 3.5 mg/dL (H)).   · To dose vancomycin, pharmacy will be utilizing dosing based off of levels due to patient requiring hemodialysis   · Patient ESRD on HD  · 1/2: HD x 3 hrs  · 1/3: Post-dialysis level = 27.8 mcg/mL    Plan:  · No HD today; no dose  · Will continue to monitor dialysis schedule  · Clinical pharmacy to follow      Maricruz Guerrero PharmD 1/3/2022 3:53 PM   [phone]

## 2022-01-03 NOTE — PROGRESS NOTES
Pt transferred to 638 bed 1. On telemetry, belongings including back pack and cell phone. VSS. Update given to primary nurse.

## 2022-01-03 NOTE — PROGRESS NOTES
[address] [place], [place] [address]ist   Progress Note    Admitting Date and Time: 12/31/2021  9:14 PM  Admit Dx: DKA, type 1, not at goal [facility]) [E10.10]  Type 1 diabetes mellitus with ketoacidosis without coma ([facility] [address] [E10.10]  COVID-19 [U07.1]    Subjective/interval history:    Pt states she has \"pain all over\". No relief with Tylenol. Denies any pain at her hemodialysis catheter site. Blood cultures show no growth to date. ROS: denies fever, chills, cp, sob, n/v, HA unless stated above.      albumin human  25 g IntraVENous Q8H    [START ON 1/5/2022] epoetin nena-epbx  10,000 Units SubCUTAneous Once per day on Mon Wed Fri    insulin glargine  6 Units SubCUTAneous Nightly    insulin lispro  0-12 Units SubCUTAneous TID WC    insulin lispro  0-6 Units SubCUTAneous Nightly    midodrine  10 mg Oral Q8H    lactobacillus  1 capsule Oral Daily    sodium chloride flush  5-40 mL IntraVENous 2 times per day    heparin (porcine)  5,000 Units SubCUTAneous 3 times per day    piperacillin-tazobactam  3,375 mg IntraVENous Q8H    doxycycline (VIBRAMYCIN) IV  100 mg IntraVENous Q12H    vancomycin (VANCOCIN) intermittent dosing (placeholder)   Other RX Placeholder     fentanNYL, 12.5 mcg, Q3H PRN  glucose, 15 g, PRN  dextrose, 12.5 g, PRN  glucagon (rDNA), 1 mg, PRN  dextrose, 100 mL/hr, PRN  sodium chloride flush, 10 mL, PRN  sodium chloride, 25 mL, PRN  ondansetron, 4 mg, Q8H PRN   Or  ondansetron, 4 mg, Q6H PRN  polyethylene glycol, 17 g, Daily PRN  acetaminophen, 650 mg, Q6H PRN   Or  acetaminophen, 650 mg, Q6H PRN         Objective:    /86   Pulse 106   Temp 98 °F (36.7 °C) (Infrared)   Resp 10   Ht 5' 4\" (1.626 m)   Wt 130 lb 11.7 oz (59.3 kg)   SpO2 96%   BMI 22.44 kg/m²   General Appearance: Alert and oriented x3, appears uncomfortable and anxious  Skin: warm and dry  Head: normocephalic and atraumatic  Eyes: pupils equal, round, and reactive to light, extraocular eye movements intact, conjunctivae normal  Neck: neck supple and non tender without mass   Pulmonary/Chest: Nonlabored on room air. Clear to auscultation bilaterally. Cardiovascular: normal rate, normal S1 and S2 with grade 1/6 systolic flow murmur and no carotid bruits  Abdomen: soft, non-tender, non-distended, normal bowel sounds, no masses or organomegaly  Extremities: no cyanosis, no clubbing and no edema  Neurologic: no cranial nerve deficit and speech normal      Recent Labs     01/02/22  0615 01/02/22  1627 01/03/22  0555   * 128* 127*   K 3.2* 3.6 4.1   CL 97* 94* 95*   CO2 21* 26 22   BUN 20 10 12   CREATININE 4.5* 3.0* 3.5*   GLUCOSE 65* 202* 197*   CALCIUM 6.4* 6.6* 6.5*       Recent Labs     12/31/21  2216 01/02/22  0615 01/03/22  0555   ALKPHOS 125* 119* 165*   PROT 5.1* 4.1* 4.3*   LABALBU 2.1* 2.0* 2.0*   BILITOT <0.2 <0.2 0.2   AST 33* 33* 78*   ALT 9 9 15       Recent Labs     12/31/21  2235 01/02/22  0615 01/03/22  0555   WBC 4.9 3.2* 2.6*   RBC 3.84 3.31* 3.22*   HGB 10.4* 8.8* 8.7*   HCT 35.1 27.3* 26.2*   MCV 91.4 82.5 81.4   MCH 27.1 26.6 27.0   MCHC 29.6* 32.2 33.2   RDW 18.6* 17.0* 17.3*    130 60*   MPV 10.5 10.6 11.1         Radiology:   XR CHEST PORTABLE   Final Result   No acute process. Dialysis catheter in place. Assessment and Plan:  Principal Problem:    DKA, type 1, not at goal [facility])  Active Problems:    Uncontrolled type 1 diabetes mellitus with hyperglycemia ([facility] [address]    End stage renal disease ([facility] [address]    Septic shock ([facility] [address]    COVID-19 virus infection  Resolved Problems:    * No resolved hospital problems. *      1. DKA in setting of uncontrolled type 1 diabetes mellitus  -DKA now resolved, and she is back on basal insulin with corrective scale    2.   Hypotension with suspected septic shock  -Patient was requiring pressors, but is now off of them this morning  -Broad-spectrum antibiotics  -Concern for possible tunneled dialysis catheter infection; blood cultures in process    3. End-stage renal disease on hemodialysis  -Nephrology following for dialysis manage    4. COVID-19 virus infection  -Not hypoxic and without respiratory symptoms at this time  -Closely monitor vital signs. Hold off on steroids at this time    DVT prophylaxis: Subcutaneous heparin  CODE STATUS: Full code    Disposition: Anticipate discharge home. Possibly in a day or 2, however awaiting final culture results    NOTE: This report was transcribed using voice recognition software. Every effort was made to ensure accuracy; however, inadvertent computerized transcription errors may be present.      Electronically signed by Katey Green DO on 1/3/2022 at 3:31 PM

## 2022-01-03 NOTE — PROGRESS NOTES
Patient is given PRN Fentanyl for c/o generalized pain of 9/10 on standard scale. Patient is agreeable to POC. /98. P 98. Will continue to monitor for decrease in BP. Patient did take Midodrine per order earlier this afternoon as had been hypotensive and on levophed yesterday. Patient did receive Fentanyl after midodrine and BP 130s/80s.  Nursing will continue to monitor

## 2022-01-03 NOTE — PROGRESS NOTES
bilaterally with no new focal signs       LABS:  Recent Labs     01/02/22  0157 01/02/22  0615 01/02/22  1627   * 130* 128*   K 2.5* 3.2* 3.6   CL 96* 97* 94*   CO2 22 21* 26   BUN 21* 20 10   CREATININE 4.5* 4.5* 3.0*   GLUCOSE 119* 65* 202*   CALCIUM 6.3* 6.4* 6.6*       Recent Labs     12/31/21  2235 01/02/22  0615   WBC 4.9 3.2*   RBC 3.84 3.31*   HGB 10.4* 8.8*   HCT 35.1 27.3*   MCV 91.4 82.5   MCH 27.1 26.6   MCHC 29.6* 32.2   RDW 18.6* 17.0*    130   MPV 10.5 10.6       No results for input(s): POCGLU in the last 72 hours. Imaging:   XR CHEST PORTABLE   Final Result   No acute process. Dialysis catheter in place. Lactic Acid:  No results for input(s): LACTA in the last 72 hours. MICRO  Blood cultures   Blood Culture, Routine   Date Value Ref Range Status   11/22/2021 5 Days no growth  Final     Respiratory cultures No results found for: RESPCULTURE   Gram Stain Result   Date Value Ref Range Status   12/08/2021 Refer to ordered Gram stain for results  Final     Organism   Organism   Date Value Ref Range Status   10/01/2021 Staphylococcus coagulase-negative (A)  Final     Aerobic No components found for: LABEARO  Anaerobic   Anaerobic Culture   Date Value Ref Range Status   05/18/2021 Anaerobes not isolated  Final     Urine   Urine Culture, Routine   Date Value Ref Range Status   09/28/2021 Growth not present  Final     Radiology: XR CHEST PORTABLE    Result Date: 12/31/2021  EXAMINATION: ONE XRAY VIEW OF THE CHEST 12/31/2021 10:02 pm COMPARISON: None. HISTORY: ORDERING SYSTEM PROVIDED HISTORY: cough, shortness of breath TECHNOLOGIST PROVIDED HISTORY: Reason for exam:->cough, shortness of breath FINDINGS: The lungs are without acute focal process. There is no effusion or pneumothorax. The cardiomediastinal silhouette is without acute process. The osseous structures are without acute process. No acute process. Dialysis catheter in place.      Current Status and Ongoing Care Summary:  VENT SETTINGS:   Vent Information  SpO2: 100 %  ART Line:     ART BP SBP/DBP & MAP        ABGs:  No results for input(s): PH, PO2, PCO2, HCO3, BE, O2SAT in the last 72 hours. MEDS:   insulin glargine  6 Units SubCUTAneous Nightly    insulin lispro  0-12 Units SubCUTAneous TID WC    insulin lispro  0-6 Units SubCUTAneous Nightly    [START ON 1/3/2022] epoetin nena-epbx  3,000 Units SubCUTAneous Once per day on Mon Wed Fri    midodrine  10 mg Oral Q8H    lactobacillus  1 capsule Oral Daily    sodium chloride flush  5-40 mL IntraVENous 2 times per day    heparin (porcine)  5,000 Units SubCUTAneous 3 times per day    piperacillin-tazobactam  3,375 mg IntraVENous Q8H    doxycycline (VIBRAMYCIN) IV  100 mg IntraVENous Q12H    vancomycin (VANCOCIN) intermittent dosing (placeholder)   Other RX Placeholder      dextrose      sodium chloride       glucose, dextrose, glucagon (rDNA), dextrose, sodium chloride flush, sodium chloride, ondansetron **OR** ondansetron, polyethylene glycol, acetaminophen **OR** acetaminophen    ASSESSMENT:    Present on Admission:   DKA, type 1, not at goal [facility])    PLAN:  # DKA -RESOLVED   IV insulin stopped  On Low carb diet  Nutritional and dietary counseling   Placed on Basal + Mealtime + Sliding scale inulin   Bedside glucose before meals and bedtime and adjust insulin accordingly  # Hypotension - IV levophed stopped- BP is now normal              Started with IV Zosyn and Vancomycin - for possible spesis   F/u blood culture, deescalate antibiotics  # ESRD on PD> HD  BUN/Cr, Hb&Hct is stable  Renal dialysis diet   Fluid restriction to 1 litre a day.   Dialysis as per renal team  # COVID19 positive              No fever, No SOB or hypoxia              Isolations              Will monitor clinically  # Rest of the chronic medical problems are stable and will be managed with appropriately with home medications, placed nursing communication order to verify home medications before giving them to the patient. # Diet: On PO Diet  # IVF's: No  # Fall Precaution: Yes  # Disposition: Home/primary residence   # Code Status: Full code by default  # DVT Prophylaxis : SC Heparin or SC Lovenox as on chart  The patient at bedside was counseled about clinical status, laboratory/imaging results, diagnoses, medication side effects, risk, and treatment plan, all questions were answered to patient's satisfaction and verbalized understanding      SIGNATURE: Jennifer Gallardo MD PATIENT NAME: [address] [place] [address] Drive #: Hospitalist on call MRN: [id number]     Disclaimer: Portions of this note may have been generated using Dragon voice recognition software. Reasonable efforts were made to correct any dictation errors that resulted due to the programming of this software but some may still be present.

## 2022-01-04 LAB
ALBUMIN SERPL-MCNC: 3.1 G/DL (ref 3.5–5.2)
ALP BLD-CCNC: 132 U/L (ref 35–104)
ALT SERPL-CCNC: 13 U/L (ref 0–32)
ANION GAP SERPL CALCULATED.3IONS-SCNC: 13 MMOL/L (ref 7–16)
ANISOCYTOSIS: ABNORMAL
AST SERPL-CCNC: 41 U/L (ref 0–31)
BASOPHILS ABSOLUTE: 0 E9/L (ref 0–0.2)
BASOPHILS RELATIVE PERCENT: 0.6 % (ref 0–2)
BILIRUB SERPL-MCNC: 0.2 MG/DL (ref 0–1.2)
BUN BLDV-MCNC: 17 MG/DL (ref 6–20)
CALCIUM SERPL-MCNC: 7.3 MG/DL (ref 8.6–10.2)
CHLORIDE BLD-SCNC: 95 MMOL/L (ref 98–107)
CO2: 24 MMOL/L (ref 22–29)
CREAT SERPL-MCNC: 4.2 MG/DL (ref 0.5–1)
EOSINOPHILS ABSOLUTE: 0 E9/L (ref 0.05–0.5)
EOSINOPHILS RELATIVE PERCENT: 0.6 % (ref 0–6)
GFR AFRICAN AMERICAN: 15
GFR NON-AFRICAN AMERICAN: 15 ML/MIN/1.73
GLUCOSE BLD-MCNC: 92 MG/DL (ref 74–99)
HCT VFR BLD CALC: 26.1 % (ref 34–48)
HEMOGLOBIN: 8.2 G/DL (ref 11.5–15.5)
HYPOCHROMIA: ABNORMAL
LYMPHOCYTES ABSOLUTE: 0.68 E9/L (ref 1.5–4)
LYMPHOCYTES RELATIVE PERCENT: 22.1 % (ref 20–42)
MAGNESIUM: 1.9 MG/DL (ref 1.6–2.6)
MCH RBC QN AUTO: 26.4 PG (ref 26–35)
MCHC RBC AUTO-ENTMCNC: 31.4 % (ref 32–34.5)
MCV RBC AUTO: 83.9 FL (ref 80–99.9)
METER GLUCOSE: 122 MG/DL (ref 74–99)
METER GLUCOSE: 67 MG/DL (ref 74–99)
METER GLUCOSE: 79 MG/DL (ref 74–99)
METER GLUCOSE: 88 MG/DL (ref 74–99)
METER GLUCOSE: 89 MG/DL (ref 74–99)
METER GLUCOSE: 99 MG/DL (ref 74–99)
MONOCYTES ABSOLUTE: 0.03 E9/L (ref 0.1–0.95)
MONOCYTES RELATIVE PERCENT: 0.9 % (ref 2–12)
NEUTROPHILS ABSOLUTE: 2.39 E9/L (ref 1.8–7.3)
NEUTROPHILS RELATIVE PERCENT: 77 % (ref 43–80)
OVALOCYTES: ABNORMAL
PDW BLD-RTO: 17.6 FL (ref 11.5–15)
PHOSPHORUS: 3.2 MG/DL (ref 2.5–4.5)
PLATELET # BLD: 73 E9/L (ref 130–450)
PLATELET CONFIRMATION: NORMAL
PMV BLD AUTO: 11.3 FL (ref 7–12)
POIKILOCYTES: ABNORMAL
POTASSIUM SERPL-SCNC: 3.7 MMOL/L (ref 3.5–5)
RBC # BLD: 3.11 E12/L (ref 3.5–5.5)
SODIUM BLD-SCNC: 132 MMOL/L (ref 132–146)
TARGET CELLS: ABNORMAL
TOTAL PROTEIN: 5.2 G/DL (ref 6.4–8.3)
WBC # BLD: 3.1 E9/L (ref 4.5–11.5)

## 2022-01-04 PROCEDURE — 36592 COLLECT BLOOD FROM PICC: CPT

## 2022-01-04 PROCEDURE — 6360000002 HC RX W HCPCS: Performed by: INTERNAL MEDICINE

## 2022-01-04 PROCEDURE — 82962 GLUCOSE BLOOD TEST: CPT

## 2022-01-04 PROCEDURE — 85025 COMPLETE CBC W/AUTO DIFF WBC: CPT

## 2022-01-04 PROCEDURE — 6370000000 HC RX 637 (ALT 250 FOR IP): Performed by: INTERNAL MEDICINE

## 2022-01-04 PROCEDURE — 6370000000 HC RX 637 (ALT 250 FOR IP)

## 2022-01-04 PROCEDURE — 84100 ASSAY OF PHOSPHORUS: CPT

## 2022-01-04 PROCEDURE — 5A1D70Z PERFORMANCE OF URINARY FILTRATION, INTERMITTENT, LESS THAN 6 HOURS PER DAY: ICD-10-PCS | Performed by: INTERNAL MEDICINE

## 2022-01-04 PROCEDURE — 2580000003 HC RX 258

## 2022-01-04 PROCEDURE — 2580000003 HC RX 258: Performed by: INTERNAL MEDICINE

## 2022-01-04 PROCEDURE — 90935 HEMODIALYSIS ONE EVALUATION: CPT

## 2022-01-04 PROCEDURE — 6360000002 HC RX W HCPCS

## 2022-01-04 PROCEDURE — P9047 ALBUMIN (HUMAN), 25%, 50ML: HCPCS | Performed by: INTERNAL MEDICINE

## 2022-01-04 PROCEDURE — 1200000000 HC SEMI PRIVATE

## 2022-01-04 PROCEDURE — 80053 COMPREHEN METABOLIC PANEL: CPT

## 2022-01-04 PROCEDURE — 36415 COLL VENOUS BLD VENIPUNCTURE: CPT

## 2022-01-04 PROCEDURE — 2500000003 HC RX 250 WO HCPCS: Performed by: INTERNAL MEDICINE

## 2022-01-04 PROCEDURE — 83735 ASSAY OF MAGNESIUM: CPT

## 2022-01-04 PROCEDURE — 99232 SBSQ HOSP IP/OBS MODERATE 35: CPT | Performed by: INTERNAL MEDICINE

## 2022-01-04 RX ORDER — 0.9 % SODIUM CHLORIDE 0.9 %
25 INTRAVENOUS SOLUTION INTRAVENOUS EVERY 12 HOURS
Status: DISCONTINUED | OUTPATIENT
Start: 2022-01-05 | End: 2022-01-05 | Stop reason: HOSPADM

## 2022-01-04 RX ORDER — FENTANYL CITRATE 50 UG/ML
12.5 INJECTION, SOLUTION INTRAMUSCULAR; INTRAVENOUS
Status: DISCONTINUED | OUTPATIENT
Start: 2022-01-04 | End: 2022-01-05 | Stop reason: HOSPADM

## 2022-01-04 RX ADMIN — FENTANYL CITRATE 12.5 MCG: 0.05 INJECTION, SOLUTION INTRAMUSCULAR; INTRAVENOUS at 06:34

## 2022-01-04 RX ADMIN — DOXYCYCLINE 100 MG: 100 INJECTION, POWDER, LYOPHILIZED, FOR SOLUTION INTRAVENOUS at 05:37

## 2022-01-04 RX ADMIN — ACETAMINOPHEN 650 MG: 325 TABLET ORAL at 10:03

## 2022-01-04 RX ADMIN — Medication 10 ML: at 20:54

## 2022-01-04 RX ADMIN — FENTANYL CITRATE 12.5 MCG: 0.05 INJECTION, SOLUTION INTRAMUSCULAR; INTRAVENOUS at 21:53

## 2022-01-04 RX ADMIN — PIPERACILLIN SODIUM AND TAZOBACTAM SODIUM 3375 MG: 3; .375 INJECTION, POWDER, LYOPHILIZED, FOR SOLUTION INTRAVENOUS at 20:53

## 2022-01-04 RX ADMIN — Medication 10 ML: at 10:04

## 2022-01-04 RX ADMIN — DOXYCYCLINE 100 MG: 100 INJECTION, POWDER, LYOPHILIZED, FOR SOLUTION INTRAVENOUS at 17:34

## 2022-01-04 RX ADMIN — ONDANSETRON 4 MG: 2 INJECTION INTRAMUSCULAR; INTRAVENOUS at 06:34

## 2022-01-04 RX ADMIN — EPOETIN ALFA-EPBX 10000 UNITS: 10000 INJECTION, SOLUTION INTRAVENOUS; SUBCUTANEOUS at 20:54

## 2022-01-04 RX ADMIN — FENTANYL CITRATE 12.5 MCG: 0.05 INJECTION, SOLUTION INTRAMUSCULAR; INTRAVENOUS at 03:26

## 2022-01-04 RX ADMIN — ACETAMINOPHEN 650 MG: 325 TABLET ORAL at 18:21

## 2022-01-04 RX ADMIN — VANCOMYCIN HYDROCHLORIDE 500 MG: 500 INJECTION, POWDER, LYOPHILIZED, FOR SOLUTION INTRAVENOUS at 17:22

## 2022-01-04 RX ADMIN — ONDANSETRON 4 MG: 4 TABLET, ORALLY DISINTEGRATING ORAL at 17:35

## 2022-01-04 RX ADMIN — FENTANYL CITRATE 12.5 MCG: 0.05 INJECTION, SOLUTION INTRAMUSCULAR; INTRAVENOUS at 11:49

## 2022-01-04 RX ADMIN — ALBUMIN (HUMAN) 25 G: 0.25 INJECTION, SOLUTION INTRAVENOUS at 02:09

## 2022-01-04 RX ADMIN — Medication 1 CAPSULE: at 10:03

## 2022-01-04 RX ADMIN — ALBUMIN (HUMAN) 25 G: 0.25 INJECTION, SOLUTION INTRAVENOUS at 13:10

## 2022-01-04 RX ADMIN — PIPERACILLIN SODIUM AND TAZOBACTAM SODIUM 3375 MG: 3; .375 INJECTION, POWDER, LYOPHILIZED, FOR SOLUTION INTRAVENOUS at 05:37

## 2022-01-04 RX ADMIN — ALBUMIN (HUMAN) 25 G: 0.25 INJECTION, SOLUTION INTRAVENOUS at 17:34

## 2022-01-04 RX ADMIN — ACETAMINOPHEN 650 MG: 325 TABLET ORAL at 05:45

## 2022-01-04 ASSESSMENT — PAIN SCALES - GENERAL
PAINLEVEL_OUTOF10: 9
PAINLEVEL_OUTOF10: 9
PAINLEVEL_OUTOF10: 5
PAINLEVEL_OUTOF10: 7
PAINLEVEL_OUTOF10: 3
PAINLEVEL_OUTOF10: 8
PAINLEVEL_OUTOF10: 9
PAINLEVEL_OUTOF10: 9
PAINLEVEL_OUTOF10: 8
PAINLEVEL_OUTOF10: 9
PAINLEVEL_OUTOF10: 9

## 2022-01-04 ASSESSMENT — PAIN DESCRIPTION - PAIN TYPE: TYPE: CHRONIC PAIN

## 2022-01-04 ASSESSMENT — PAIN DESCRIPTION - LOCATION: LOCATION: GENERALIZED

## 2022-01-04 NOTE — FLOWSHEET NOTE
01/04/22 1232   Vital Signs   BP (!) 140/82   Temp 102 °F (38.9 °C)   Pulse 123   Resp 16   Weight 143 lb 11.8 oz (65.2 kg)   Weight Method Bed scale   Percent Weight Change -1.51   Post-Hemodialysis Assessment   Post-Treatment Procedures Blood returned;Catheter capped, clamped and heparinized x 2 ports   Machine Disinfection Process Acid/Vinegar Clean;Heat Disinfect   Rinseback Volume (ml) 3000 ml   Total Liters Processed (l/min) 51.9 l/min   Duration of Treatment (minutes) 180 minutes   Hemodialysis Intake (ml) 300 ml   Hemodialysis Output (ml) 1500 ml   NET Removed (ml) 1000 ml   Tolerated Treatment Fair   Patient Response to Treatment pt medicated for pain, on going fever reported to [facility] staff   Physician Notified?  No

## 2022-01-04 NOTE — PROGRESS NOTES
CRITICAL CARE PROGRESS NOTE    Ms Dino Bobby has a significant PMH of CKD V, uncontrolled diabetes mellitus type 1, hypothyroidism, depression, endocarditis and was admitted for management of diabetic ketoacidosis. She is positive for SARS-CoV2 and is not hypoxemic, she is on room air. Ms Dino Bobby is at high risk of severe COVID and death due to her comorbidities. I recommend administration of Sotrovimab to prevent severe COVID-19 and death.     Tomas Ding MD  Pulmonary and Critical Care Medicine

## 2022-01-04 NOTE — PROGRESS NOTES
Patient has just completed dialysis and tolerated well. Per dialysis nurse patient had 1L of fluid removed. Current BS is 67 and patient is awake and arousable. Patient was given 120 ml of applejuice. Will recheck blood sugar in 15minutes as patient refused lunch.

## 2022-01-04 NOTE — CARE COORDINATION
SOCIAL WORK / DISCHARGE PLANNING:  COVID positive 12/31. Sw attempted to speak with pt via phone x 2 with no answer to phone. Medical record review, pt resides with mother and her children in a 2 story home with 1st floor set up. Mother completed Home Dialysis on pt 5x/ week with supplies from [facility]. Hx outpt HD at [facility] as well. Pt has transport w/c, shower chair and BSC. Hx [facility] and [facility]. Await final cultures, concern was noted for HD cath infection but no growth to date. Per RN, pt requested Fentanyl. She is currently on multiple IVs. Heparin was on hold due to platelets. Need to confirm dc plan but anticipate home with mother as previous. ACP was completed 11/23 by ED MINISTERIO, full code noted currently.                      Electronically signed by HANNAH Suh on 1/4/2022 at 5:08 PM

## 2022-01-04 NOTE — PROGRESS NOTES
[address] [place], [place] [address]ist   Progress Note    Admitting Date and Time: 12/31/2021  9:14 PM  Admit Dx: DKA, type 1, not at goal [facility]) [E10.10]  Type 1 diabetes mellitus with ketoacidosis without coma ([facility] [address] [E10.10]  COVID-19 [U07.1]    Subjective/interval history:    1/3: Pt states she has \"pain all over\". No relief with Tylenol. Denies any pain at her hemodialysis catheter site. Blood cultures show no growth to date. 1/4: Patient still having pain all over, and has significant nausea with eating. She has as needed Zofran ordered, but has not required this in the last 3 days. Per RN: Patient had symptomatic hypoglycemia this morning, but did not want to drink juice    ROS: denies fever, chills, cp, sob, n/v, HA unless stated above.      piperacillin-tazobactam  3,375 mg IntraVENous Q12H    sodium chloride  25 mL IntraVENous Q12H    vancomycin  500 mg IntraVENous Once    albumin human  25 g IntraVENous Q8H    [START ON 1/5/2022] epoetin nena-epbx  10,000 Units SubCUTAneous Once per day on Mon Wed Fri    insulin glargine  6 Units SubCUTAneous Nightly    insulin lispro  0-12 Units SubCUTAneous TID WC    insulin lispro  0-6 Units SubCUTAneous Nightly    midodrine  10 mg Oral Q8H    lactobacillus  1 capsule Oral Daily    sodium chloride flush  5-40 mL IntraVENous 2 times per day    heparin (porcine)  5,000 Units SubCUTAneous 3 times per day    doxycycline (VIBRAMYCIN) IV  100 mg IntraVENous Q12H    vancomycin (VANCOCIN) intermittent dosing (placeholder)   Other RX Placeholder     fentanNYL, 12.5 mcg, Q3H PRN  EPINEPHrine, 0.3 mg, PRN  albuterol sulfate HFA, 4 puff, PRN  glucose, 15 g, PRN  dextrose, 12.5 g, PRN  glucagon (rDNA), 1 mg, PRN  dextrose, 100 mL/hr, PRN  sodium chloride flush, 10 mL, PRN  sodium chloride, 25 mL, PRN  ondansetron, 4 mg, Q8H PRN   Or  ondansetron, 4 mg, Q6H PRN  polyethylene glycol, 17 g, Daily PRN  acetaminophen, 650 mg, Q6H PRN   Or  acetaminophen, 650 mg, Q6H PRN         Objective:    BP (!) 140/82   Pulse 120   Temp 99.1 °F (37.3 °C) (Infrared)   Resp 16   Ht 5' 4\" (1.626 m)   Wt 143 lb 11.8 oz (65.2 kg)   SpO2 95%   BMI 24.67 kg/m²   General Appearance: Alert and oriented x3, appears slightly uncomfortable this afternoon  Skin: warm and dry  Head: normocephalic and atraumatic  Eyes: pupils equal, round, and reactive to light, extraocular eye movements intact, conjunctivae normal  Neck: neck supple and non tender without mass   Pulmonary/Chest: Nonlabored on room air. Clear to auscultation bilaterally. Cardiovascular: normal rate, normal S1 and S2 with grade 1/6 systolic flow murmur and no carotid bruits  Abdomen: soft, non-tender, non-distended, normal bowel sounds, no masses or organomegaly  Extremities: no cyanosis, no clubbing and no edema  Neurologic: no cranial nerve deficit and speech normal      Recent Labs     01/02/22  1627 01/03/22  0555 01/04/22  0544   * 127* 132   K 3.6 4.1 3.7   CL 94* 95* 95*   CO2 26 22 24   BUN 10 12 17   CREATININE 3.0* 3.5* 4.2*   GLUCOSE 202* 197* 92   CALCIUM 6.6* 6.5* 7.3*       Recent Labs     01/02/22  0615 01/03/22  0555 01/04/22  0544   ALKPHOS 119* 165* 132*   PROT 4.1* 4.3* 5.2*   LABALBU 2.0* 2.0* 3.1*   BILITOT <0.2 0.2 0.2   AST 33* 78* 41*   ALT 9 15 13       Recent Labs     01/02/22  0615 01/03/22  0555 01/04/22  0544   WBC 3.2* 2.6* 3.1*   RBC 3.31* 3.22* 3.11*   HGB 8.8* 8.7* 8.2*   HCT 27.3* 26.2* 26.1*   MCV 82.5 81.4 83.9   MCH 26.6 27.0 26.4   MCHC 32.2 33.2 31.4*   RDW 17.0* 17.3* 17.6*    60* 73*   MPV 10.6 11.1 11.3         Radiology:   XR CHEST PORTABLE   Final Result   No acute process. Dialysis catheter in place.              Assessment and Plan:  Principal Problem:    DKA, type 1, not at goal [facility])  Active Problems:    Uncontrolled type 1 diabetes mellitus with hyperglycemia ([facility] [address]    End stage renal disease ([facility] [address]    Septic shock ([facility] [address]    COVID-19 virus infection  Resolved Problems:    * No resolved hospital problems. *      1. DKA in setting of uncontrolled type 1 diabetes mellitus  -DKA now resolved, and she is back on basal insulin with corrective scale    2. Hypotension with suspected septic shock  -Patient was requiring pressors, but is now off of them as of yesterday early morning  -Continue broad-spectrum antibiotics for now  -Concern for possible tunneled dialysis catheter infection, however blood cultures showing no growth. If blood cultures show no growth at 48 hours, will discuss the possibility of discontinuing broad-spectrum antibiotics with pulmonary/critical care    3. End-stage renal disease on hemodialysis  -Nephrology following for dialysis manage    4. COVID-19 virus infection  -Not hypoxic and without respiratory symptoms at this time  -Closely monitor vital signs. Hold off on steroids at this time    DVT prophylaxis: Subcutaneous heparin  CODE STATUS: Full code    Disposition: Anticipate discharge home. Possibly in a day or 2, however awaiting final culture results    NOTE: This report was transcribed using voice recognition software. Every effort was made to ensure accuracy; however, inadvertent computerized transcription errors may be present.      Electronically signed by Viri Reyes DO on 1/4/2022 at 2:40 PM

## 2022-01-04 NOTE — PROGRESS NOTES
Pharmacy Consultation Note  (Antibiotic Dosing and Monitoring)    Initial consult date: 1/1/22  Consulting physician/provider: Dr. Genesis Shafer  Drug: Vancomycin  Indication: HAP    Age/  Gender Height Weight IBW  Allergy Information   31 y.o./female 5' 4\" (162.6 cm) 134 lb (60.8 kg)     Ideal body weight: 54.7 kg (120 lb 9.5 oz)  Adjusted ideal body weight: 58.9 kg (129 lb 13.6 oz)   Cefepime and Toradol [ketorolac tromethamine]      Renal Function:  Recent Labs     01/02/22  1627 01/03/22  0555 01/04/22  0544   BUN 10 12 17   CREATININE 3.0* 3.5* 4.2*       Intake/Output Summary (Last 24 hours) at 1/4/2022 1426  Last data filed at 1/4/2022 1232  Gross per 24 hour   Intake 632.5 ml   Output 1500 ml   Net -867.5 ml       Vancomycin Monitoring:  Trough:  No results for input(s): VANCOTROUGH in the last 72 hours. Random:    Recent Labs     01/02/22  1627 01/03/22  0555   VANCORANDOM 15.4 27.8       Vancomycin Administration Times:    Recent vancomycin administrations                   vancomycin (VANCOCIN) 1,250 mg in dextrose 5 % 250 mL IVPB (mg) 1,250 mg New Bag 01/01/22 6103                Assessment:  · Patient is a 36 y.o. female who has been initiated on vancomycin  Estimated Creatinine Clearance: 17 mL/min (A) (based on SCr of 4.2 mg/dL (H)).   · To dose vancomycin, pharmacy will be utilizing dosing based off of levels due to patient requiring hemodialysis   · Patient ESRD on HD  · 1/2: HD x 3 hrs  · 1/3: Post-dialysis level = 27.8 mcg/mL  · 1/4: HD x 3 hrs    Plan:  · S/p 3 hrs HD  · Vancomycin 500 mg IV x 1  · Will continue to monitor dialysis schedule  · Clinical pharmacy to follow      Abdulaziz Mai PharmD 1/4/2022 2:26 PM   [phone]

## 2022-01-04 NOTE — PROGRESS NOTES
Blood sugar recheck done, and is now 79. Patient given orange juice  ml. And will recheck Blood sugar.

## 2022-01-04 NOTE — PROGRESS NOTES
Department of Internal Medicine  Nephrology Progress Note      Events reviewed. Seen on dialysis and tolerating well. SUBJECTIVE:  We are following Miss Vitor Atwood for ESRD on HD. She reports nausea and vomiting.      PHYSICAL EXAM:      Vitals:    VITALS:  /80   Pulse 119   Temp 102.4 °F (39.1 °C) (Infrared)   Resp 16   Ht 5' 4\" (1.626 m)   Wt 147 lb 1.6 oz (66.7 kg)   SpO2 95%   BMI 25.25 kg/m²   24HR INTAKE/OUTPUT:      Intake/Output Summary (Last 24 hours) at 1/4/2022 9256  Last data filed at 1/3/2022 1855  Gross per 24 hour   Intake 672.5 ml   Output 100 ml   Net 572.5 ml     Access: RIJ tunneled dialysis catheter  Constitutional:  Alert and awake  HEENT:  PERRL, normocephalic, atraumatic  Respiratory:  CTA bilateral   Cardiovascular/Edema:  ST, RRR, no murmur gallop or rub  Gastrointestinal:  abd distended, c/o persistent abdominal pain  Neurologic:  Alert and oriented, no focal deficits  Skin:  Warm, dry, ecchymotic areas to abdomen  Other:    Scheduled Meds:   albumin human  25 g IntraVENous Q8H    [START ON 1/5/2022] epoetin nena-epbx  10,000 Units SubCUTAneous Once per day on Mon Wed Fri    insulin glargine  6 Units SubCUTAneous Nightly    insulin lispro  0-12 Units SubCUTAneous TID WC    insulin lispro  0-6 Units SubCUTAneous Nightly    midodrine  10 mg Oral Q8H    lactobacillus  1 capsule Oral Daily    sodium chloride flush  5-40 mL IntraVENous 2 times per day    heparin (porcine)  5,000 Units SubCUTAneous 3 times per day    piperacillin-tazobactam  3,375 mg IntraVENous Q8H    doxycycline (VIBRAMYCIN) IV  100 mg IntraVENous Q12H    vancomycin (VANCOCIN) intermittent dosing (placeholder)   Other RX Placeholder     Continuous Infusions:   sodium chloride      dextrose      sodium chloride       PRN Meds:.fentanNYL, sodium chloride, EPINEPHrine, albuterol sulfate HFA, glucose, dextrose, glucagon (rDNA), dextrose, sodium chloride flush, sodium chloride, ondansetron **OR** ondansetron, polyethylene glycol, acetaminophen **OR** acetaminophen    DATA:    CBC:   Lab Results   Component Value Date    WBC 3.1 01/04/2022    RBC 3.11 01/04/2022    HGB 8.2 01/04/2022    HCT 26.1 01/04/2022    MCV 83.9 01/04/2022    MCH 26.4 01/04/2022    MCHC 31.4 01/04/2022    RDW 17.6 01/04/2022    PLT 73 01/04/2022    MPV 11.3 01/04/2022     CMP:    Lab Results   Component Value Date     01/04/2022    K 3.7 01/04/2022    K 4.2 11/24/2021    CL 95 01/04/2022    CO2 24 01/04/2022    BUN 17 01/04/2022    CREATININE 4.2 01/04/2022    GFRAA 15 01/04/2022    LABGLOM 15 01/04/2022    GLUCOSE 92 01/04/2022    GLUCOSE 130 05/18/2012    PROT 5.2 01/04/2022    LABALBU 3.1 01/04/2022    LABALBU 4.1 05/18/2012    CALCIUM 7.3 01/04/2022    BILITOT 0.2 01/04/2022    ALKPHOS 132 01/04/2022    AST 41 01/04/2022    ALT 13 01/04/2022     Magnesium:    Lab Results   Component Value Date    MG 1.9 01/04/2022     Phosphorus:    Lab Results   Component Value Date    PHOS 3.2 01/04/2022     Radiology Review:      CXR December 31, 2021   No acute process.       Dialysis catheter in place.                 IMPRESSION/RECOMMENDATIONS:      Briefly, Miss Nohemi Soni is a 36year-old female with a PMH of ESRD on home hemodialysis 5 days/wk, (initiated September 2021, Type I DM, poorly controlled with recurrent admissions for DKA, HTN, gastroparesis, ascites, infective endocarditis, cardiac arrest, hypothyroidism and depression. She was admitted on January 1, 2022, after presenting to the ED with complaints of \"not feeling well\". Patient states everyone in her house has Covid, she subsequently tested positive as well, she has not received her covid vaccine. She denies missing any hemodialysis treatments. We are consulted for dialysis management. 1. ESRD on home hemodialysis 5 days/wk via RIJ tunneled dialysis catheter. HD initiated September 2021 after failed peritoneal dialysis with persistent hyperkalemia.  For dialysis three times/wk TTS while inpatient. Seen on dialysis and tolerating well. 2. Hyponatremia, 2/2 decreased free water excretion from ESRD. 1L fluid restriction. 2. Hypokalemia, multifactorial - 2/2 GI losses from vomiting, diarrhea and poor oral intake in the setting of loop diuretic administration (takes Bumex at home). 3. HTN, on lopressor and amlodipine at home  4. Vitamin D deficiency, on ergocalciferol at home  5. MBD of CKD, on sevelamer (hold until phosphorus level obtained)  6. Anemia of CKD,  Hgb 8.2 mg/dL today, start epoetin alpha 3,000 unit 3 times/wk  7. Hemodynamic shock, requiring the use of vasopressor support to maintain MAP >65  8. Type I DM, poorly controlled with frequent readmissions for DKA  ------------------------------------------------------  9. Covid 19, unvaccinated  10. Acute hypoxic respiratory failure 2/2 viral pneumonia from covid 19  11. Restless leg syndrome, on ropinirole at home  12. Ascites, unknown etiology, s/p paracentesis September 2021. LFT within normal limits  13. Depression, on Abilify  14. Hypothyroidism, on levothyroxine   15. History of gastroparesis, on metoclopramide         Plan:    · Continue HD TTS while inpatient, Seen on dialysis and tolerating well.   · Continue epoetin alpha 3,000 units 3 times/wk  · Continue SPA 25 gm iv with each HD  · Replace potassium prn  · Replace calcium prn  · Replace magnesium prn  · Monitor labs daily  · Monitor glucose levels  · Monitor ionized calcium levels daily      Electronically signed by HEBER Mondragon CNP on 1/4/2022 at 9:24 AM

## 2022-01-05 VITALS
BODY MASS INDEX: 24.54 KG/M2 | DIASTOLIC BLOOD PRESSURE: 86 MMHG | RESPIRATION RATE: 18 BRPM | TEMPERATURE: 100 F | OXYGEN SATURATION: 93 % | WEIGHT: 143.74 LBS | HEIGHT: 64 IN | HEART RATE: 101 BPM | SYSTOLIC BLOOD PRESSURE: 151 MMHG

## 2022-01-05 LAB
ALBUMIN SERPL-MCNC: 3.8 G/DL (ref 3.5–5.2)
ALP BLD-CCNC: 127 U/L (ref 35–104)
ALT SERPL-CCNC: 11 U/L (ref 0–32)
ANION GAP SERPL CALCULATED.3IONS-SCNC: 12 MMOL/L (ref 7–16)
ANISOCYTOSIS: ABNORMAL
AST SERPL-CCNC: 51 U/L (ref 0–31)
BASOPHILS ABSOLUTE: 0.02 E9/L (ref 0–0.2)
BASOPHILS RELATIVE PERCENT: 0.5 % (ref 0–2)
BILIRUB SERPL-MCNC: 0.3 MG/DL (ref 0–1.2)
BUN BLDV-MCNC: 12 MG/DL (ref 6–20)
CALCIUM IONIZED: 1.07 MMOL/L (ref 1.15–1.33)
CALCIUM IONIZED: 1.07 MMOL/L (ref 1.15–1.33)
CALCIUM SERPL-MCNC: 7.6 MG/DL (ref 8.6–10.2)
CHLORIDE BLD-SCNC: 99 MMOL/L (ref 98–107)
CO2: 24 MMOL/L (ref 22–29)
CREAT SERPL-MCNC: 3.5 MG/DL (ref 0.5–1)
EOSINOPHILS ABSOLUTE: 0.02 E9/L (ref 0.05–0.5)
EOSINOPHILS RELATIVE PERCENT: 0.5 % (ref 0–6)
GFR AFRICAN AMERICAN: 19
GFR NON-AFRICAN AMERICAN: 19 ML/MIN/1.73
GLUCOSE BLD-MCNC: 107 MG/DL (ref 74–99)
HCT VFR BLD CALC: 24.2 % (ref 34–48)
HEMOGLOBIN: 7.8 G/DL (ref 11.5–15.5)
HYPOCHROMIA: ABNORMAL
IMMATURE GRANULOCYTES #: 0.03 E9/L
IMMATURE GRANULOCYTES %: 0.8 % (ref 0–5)
LYMPHOCYTES ABSOLUTE: 1.16 E9/L (ref 1.5–4)
LYMPHOCYTES RELATIVE PERCENT: 29.4 % (ref 20–42)
MAGNESIUM: 1.9 MG/DL (ref 1.6–2.6)
MCH RBC QN AUTO: 26.8 PG (ref 26–35)
MCHC RBC AUTO-ENTMCNC: 32.2 % (ref 32–34.5)
MCV RBC AUTO: 83.2 FL (ref 80–99.9)
METER GLUCOSE: 115 MG/DL (ref 74–99)
METER GLUCOSE: 123 MG/DL (ref 74–99)
MONOCYTES ABSOLUTE: 0.07 E9/L (ref 0.1–0.95)
MONOCYTES RELATIVE PERCENT: 1.8 % (ref 2–12)
NEUTROPHILS ABSOLUTE: 2.64 E9/L (ref 1.8–7.3)
NEUTROPHILS RELATIVE PERCENT: 67 % (ref 43–80)
OVALOCYTES: ABNORMAL
PDW BLD-RTO: 17.9 FL (ref 11.5–15)
PHOSPHORUS: 2.6 MG/DL (ref 2.5–4.5)
PLATELET # BLD: 89 E9/L (ref 130–450)
PLATELET CONFIRMATION: NORMAL
PMV BLD AUTO: 12.7 FL (ref 7–12)
POIKILOCYTES: ABNORMAL
POTASSIUM SERPL-SCNC: 3.5 MMOL/L (ref 3.5–5)
RBC # BLD: 2.91 E12/L (ref 3.5–5.5)
SODIUM BLD-SCNC: 135 MMOL/L (ref 132–146)
TARGET CELLS: ABNORMAL
TOTAL PROTEIN: 5.5 G/DL (ref 6.4–8.3)
WBC # BLD: 3.9 E9/L (ref 4.5–11.5)

## 2022-01-05 PROCEDURE — P9047 ALBUMIN (HUMAN), 25%, 50ML: HCPCS | Performed by: INTERNAL MEDICINE

## 2022-01-05 PROCEDURE — 6360000002 HC RX W HCPCS: Performed by: INTERNAL MEDICINE

## 2022-01-05 PROCEDURE — 2580000003 HC RX 258

## 2022-01-05 PROCEDURE — 82330 ASSAY OF CALCIUM: CPT

## 2022-01-05 PROCEDURE — 82962 GLUCOSE BLOOD TEST: CPT

## 2022-01-05 PROCEDURE — 80053 COMPREHEN METABOLIC PANEL: CPT

## 2022-01-05 PROCEDURE — 36415 COLL VENOUS BLD VENIPUNCTURE: CPT

## 2022-01-05 PROCEDURE — 84100 ASSAY OF PHOSPHORUS: CPT

## 2022-01-05 PROCEDURE — 85025 COMPLETE CBC W/AUTO DIFF WBC: CPT

## 2022-01-05 PROCEDURE — 99239 HOSP IP/OBS DSCHRG MGMT >30: CPT | Performed by: INTERNAL MEDICINE

## 2022-01-05 PROCEDURE — 2580000003 HC RX 258: Performed by: INTERNAL MEDICINE

## 2022-01-05 PROCEDURE — 6370000000 HC RX 637 (ALT 250 FOR IP): Performed by: INTERNAL MEDICINE

## 2022-01-05 PROCEDURE — 83735 ASSAY OF MAGNESIUM: CPT

## 2022-01-05 PROCEDURE — 2500000003 HC RX 250 WO HCPCS: Performed by: INTERNAL MEDICINE

## 2022-01-05 RX ORDER — DOXYCYCLINE HYCLATE 100 MG/1
100 CAPSULE ORAL 2 TIMES DAILY
Qty: 10 CAPSULE | Refills: 0 | Status: SHIPPED | OUTPATIENT
Start: 2022-01-05 | End: 2022-01-10

## 2022-01-05 RX ORDER — ALBUTEROL SULFATE 90 UG/1
4 AEROSOL, METERED RESPIRATORY (INHALATION) PRN
Qty: 18 G | Refills: 0 | Status: SHIPPED | OUTPATIENT
Start: 2022-01-05

## 2022-01-05 RX ADMIN — FENTANYL CITRATE 12.5 MCG: 0.05 INJECTION, SOLUTION INTRAMUSCULAR; INTRAVENOUS at 12:09

## 2022-01-05 RX ADMIN — SODIUM CHLORIDE 25 ML: 9 INJECTION, SOLUTION INTRAVENOUS at 13:00

## 2022-01-05 RX ADMIN — FENTANYL CITRATE 12.5 MCG: 0.05 INJECTION, SOLUTION INTRAMUSCULAR; INTRAVENOUS at 00:53

## 2022-01-05 RX ADMIN — Medication 1 CAPSULE: at 08:51

## 2022-01-05 RX ADMIN — FENTANYL CITRATE 12.5 MCG: 0.05 INJECTION, SOLUTION INTRAMUSCULAR; INTRAVENOUS at 05:17

## 2022-01-05 RX ADMIN — ALBUMIN (HUMAN) 25 G: 0.25 INJECTION, SOLUTION INTRAVENOUS at 00:53

## 2022-01-05 RX ADMIN — PIPERACILLIN SODIUM AND TAZOBACTAM SODIUM 3375 MG: 3; .375 INJECTION, POWDER, LYOPHILIZED, FOR SOLUTION INTRAVENOUS at 08:51

## 2022-01-05 RX ADMIN — FENTANYL CITRATE 12.5 MCG: 0.05 INJECTION, SOLUTION INTRAMUSCULAR; INTRAVENOUS at 08:51

## 2022-01-05 RX ADMIN — DOXYCYCLINE 100 MG: 100 INJECTION, POWDER, LYOPHILIZED, FOR SOLUTION INTRAVENOUS at 05:17

## 2022-01-05 RX ADMIN — Medication 10 ML: at 08:51

## 2022-01-05 ASSESSMENT — PAIN DESCRIPTION - LOCATION
LOCATION: GENERALIZED

## 2022-01-05 ASSESSMENT — PAIN DESCRIPTION - FREQUENCY
FREQUENCY: CONTINUOUS
FREQUENCY: CONTINUOUS

## 2022-01-05 ASSESSMENT — PAIN SCALES - GENERAL
PAINLEVEL_OUTOF10: 8
PAINLEVEL_OUTOF10: 8
PAINLEVEL_OUTOF10: 9
PAINLEVEL_OUTOF10: 8

## 2022-01-05 ASSESSMENT — PAIN DESCRIPTION - PROGRESSION
CLINICAL_PROGRESSION: NOT CHANGED
CLINICAL_PROGRESSION: NOT CHANGED

## 2022-01-05 ASSESSMENT — PAIN DESCRIPTION - PAIN TYPE
TYPE: CHRONIC PAIN

## 2022-01-05 ASSESSMENT — PAIN DESCRIPTION - ONSET
ONSET: ON-GOING
ONSET: ON-GOING

## 2022-01-05 ASSESSMENT — PAIN DESCRIPTION - DESCRIPTORS
DESCRIPTORS: ACHING;CONSTANT
DESCRIPTORS: ACHING;CONSTANT

## 2022-01-05 NOTE — PROGRESS NOTES
Temp noted to be 102.2 during VS. Patient given PRN tylenol per order. Will repeat as warranted. Crackles noted in RLL with auscultation. Encouraged client to do DB&C frequently throughout the day. SPO2 remains in the 90's. No s/s of respiratory distress. Dialysis was done this date and 1L of fluid removed.

## 2022-01-05 NOTE — DISCHARGE SUMMARY
[facility] Physician Discharge Summary       [address] [facility]  [phone]    Call today        Activity level: Follow isolation guidelines for COVID-19. Otherwise, resume normal activity    Diet: ADULT DIET; Regular; 4 carb choices (60 gm/meal); Low Potassium (Less than 3000 mg/day); 1000 ml    Labs: Continue to check blood glucose at least 4 times daily and as needed for hyperglycemia. Other routine labs per primary care physician and nephrology    Condition at discharge: Stable    Dispo: Home    Patient ID:  Seng Felix  [id number]  36 y.o.  1990    Admit date: 12/31/2021    Discharge date and time:  1/5/2022  9:42 AM    Admission Diagnoses: Principal Problem:    DKA, type 1, not at goal [facility])  Active Problems:    Uncontrolled type 1 diabetes mellitus with hyperglycemia ([facility]    End stage renal disease ([facility]    Septic shock ([facility]    COVID-19 virus infection  Resolved Problems:    * No resolved hospital problems. *      Discharge Diagnoses: Principal Problem:    DKA, type 1, not at goal [facility])  Active Problems:    Uncontrolled type 1 diabetes mellitus with hyperglycemia (HCC)    End stage renal disease ([facility]    Septic shock ([facility]    COVID-19 virus infection  Resolved Problems:    * No resolved hospital problems. *      Consults:  IP CONSULT TO CRITICAL CARE  IP CONSULT TO NEPHROLOGY  PHARMACY TO DOSE VANCOMYCIN  IP CONSULT TO CRITICAL CARE  IP CONSULT TO NEPHROLOGY    Procedures: Right femoral central venous catheter insertion    Hospital Course: This is a 77-year-old female with history significant for end-stage renal disease previously on peritoneal dialysis but now on hemodialysis via right internal jugular tunneled catheter, and uncontrolled type 1 diabetes mellitus that was admitted to the hospital with sepsis due to COVID-19 infection.  Patient was treated with IV fluids, and did require pressors overnight after admission. She also had concomitant DKA which was treated with insulin drip. She was started on broad-spectrum antibiotics to cover for possible bacterial infection including bacteremia from tunneled dialysis catheter, however blood cultures did not show any growth. No other source of bacterial infection, so antibiotics discontinued. She was given Sotrovimab infusion for COVID-19 on 1/3/2021 without adverse reaction. Blood pressure remained stable to slightly hypertensive off of pressors. Recommended that patient receive COVID-19 vaccine 90 days after Sotrovimab infusion. She states understanding. Also discussed seeking immediate medical attention for any worsening symptoms. She states understanding. Discharge Exam:  Vitals:    01/04/22 1315 01/04/22 1715 01/05/22 0051 01/05/22 0815   BP:  138/83 (!) 141/84 (!) 151/86   Pulse: 120 112 109 101   Resp:  14 16 18   Temp: 99.1 °F (37.3 °C) 102.2 °F (39 °C) 98.4 °F (36.9 °C) 100 °F (37.8 °C)   TempSrc: Infrared Infrared Oral Infrared   SpO2:   93% 93%   Weight:       Height:           General Appearance: Alert and oriented x3, does not appear to be in any distress this morning  Skin: warm and dry  Head: normocephalic and atraumatic  Eyes: pupils equal, round, and reactive to light, extraocular eye movements intact, conjunctivae normal  Neck: neck supple and non tender without mass   Pulmonary/Chest: Nonlabored on room air. Clear to auscultation bilaterally. Cardiovascular: normal rate, normal S1 and S2 with grade 1/6 systolic flow murmur and no carotid bruits  Abdomen: soft, non-tender, non-distended, normal bowel sounds, no masses or organomegaly  Extremities: no cyanosis, no clubbing and no edema  Neurologic: no cranial nerve deficit and speech normal  I/O last 3 completed shifts: In: 1342 [P.O.:780; I.V.:579]  Out: 1500   No intake/output data recorded.       LABS:  Recent Labs     01/03/22  0555 01/04/22  0544 01/05/22  0516   * 132 135   K 4.1 3.7 3.5   CL 95* 95* 99   CO2 22 24 24   BUN 12 17 12   CREATININE 3.5* 4.2* 3.5*   GLUCOSE 197* 92 107*   CALCIUM 6.5* 7.3* 7.6*       Recent Labs     01/03/22  0555 01/04/22  0544 01/05/22  0516   WBC 2.6* 3.1* 3.9*   RBC 3.22* 3.11* 2.91*   HGB 8.7* 8.2* 7.8*   HCT 26.2* 26.1* 24.2*   MCV 81.4 83.9 83.2   MCH 27.0 26.4 26.8   MCHC 33.2 31.4* 32.2   RDW 17.3* 17.6* 17.9*   PLT 60* 73* 89*   MPV 11.1 11.3 12.7*       No results for input(s): POCGLU in the last 72 hours. Imaging:  XR CHEST PORTABLE    Result Date: 12/31/2021  EXAMINATION: ONE XRAY VIEW OF THE CHEST 12/31/2021 10:02 pm COMPARISON: None. HISTORY: ORDERING SYSTEM PROVIDED HISTORY: cough, shortness of breath TECHNOLOGIST PROVIDED HISTORY: Reason for exam:->cough, shortness of breath FINDINGS: The lungs are without acute focal process. There is no effusion or pneumothorax. The cardiomediastinal silhouette is without acute process. The osseous structures are without acute process. No acute process. Dialysis catheter in place. Patient Instructions:   Current Discharge Medication List      START taking these medications    Details   albuterol sulfate  (90 Base) MCG/ACT inhaler Inhale 4 puffs into the lungs as needed for Wheezing or Shortness of Breath  Qty: 18 g, Refills: 0      doxycycline hyclate (VIBRAMYCIN) 100 MG capsule Take 1 capsule by mouth 2 times daily for 5 days  Qty: 10 capsule, Refills: 0         CONTINUE these medications which have NOT CHANGED    Details   sevelamer (RENVELA) 800 MG tablet TAKE 2 TABLETS BY MOUTH 3 TIMES A DAY WITH MEALS AND TAKE 1 TABLET EVERY DAY AT BEDTIME      insulin lispro, 1 Unit Dial, 100 UNIT/ML SOPN INJECT 3 UNITS WITH MEALS   SLIDING SCALE.  MAX 30U/DAY      pantoprazole (PROTONIX) 40 MG tablet Take 1 tablet by mouth 2 times daily  Qty: 30 tablet, Refills: 5    Associated Diagnoses: Gastroparesis      docusate sodium (COLACE) 100 MG capsule Take 1 capsule by mouth daily  Qty: 30 capsule,

## 2022-01-05 NOTE — CARE COORDINATION
SS NOTE: COVID POSITIVE 1/1: Pt is on room air. SW spoke with pt today and she plans on returning home today. Her mother will transport her home today. She also handles pt's peritoneal dialysis at home. Pt has no other requests for this SW at this time. HANNAH Burgos.1/5/2022.1:10 PM.

## 2022-01-05 NOTE — PROGRESS NOTES
acetaminophen    DATA:    CBC:   Lab Results   Component Value Date    WBC 3.9 01/05/2022    RBC 2.91 01/05/2022    HGB 7.8 01/05/2022    HCT 24.2 01/05/2022    MCV 83.2 01/05/2022    MCH 26.8 01/05/2022    MCHC 32.2 01/05/2022    RDW 17.9 01/05/2022    PLT 89 01/05/2022    MPV 12.7 01/05/2022     CMP:    Lab Results   Component Value Date     01/05/2022    K 3.5 01/05/2022    K 4.2 11/24/2021    CL 99 01/05/2022    CO2 24 01/05/2022    BUN 12 01/05/2022    CREATININE 3.5 01/05/2022    GFRAA 19 01/05/2022    LABGLOM 19 01/05/2022    GLUCOSE 107 01/05/2022    GLUCOSE 130 05/18/2012    PROT 5.5 01/05/2022    LABALBU 3.8 01/05/2022    LABALBU 4.1 05/18/2012    CALCIUM 7.6 01/05/2022    BILITOT 0.3 01/05/2022    ALKPHOS 127 01/05/2022    AST 51 01/05/2022    ALT 11 01/05/2022     Magnesium:    Lab Results   Component Value Date    MG 1.9 01/05/2022     Phosphorus:    Lab Results   Component Value Date    PHOS 2.6 01/05/2022     Radiology Review:      CXR December 31, 2021   No acute process.       Dialysis catheter in place.                   BRIEF SUMMARY OF INITIAL CONSULT:     Briefly, Miss Rachael Morris is a 36year-old female with a PMH of ESRD on home hemodialysis 5 days/wk, (initiated September 2021, Type I DM, poorly controlled with recurrent admissions for DKA, HTN, gastroparesis, ascites, infective endocarditis, cardiac arrest, hypothyroidism and depression. She was admitted on January 1, 2022, after presenting to the ED with complaints of \"not feeling well\". Patient states everyone in her house has Covid, she subsequently tested positive as well, she has not received her covid vaccine. She denies missing any hemodialysis treatments. We are consulted for dialysis management. IMPRESSION/RECOMMENDATIONS:    1. ESRD on home hemodialysis 5 days/wk via RIJ tunneled dialysis catheter. HD initiated September 2021 after failed peritoneal dialysis with persistent hyperkalemia.  For dialysis three times/wk TTS while inpatient. For dialysis tomorrow    2. Hyponatremia, 2/2 decreased free water excretion from ESRD. 1L fluid restriction. Resolved  2. Hypokalemia, multifactorial - 2/2 GI losses from vomiting, diarrhea and poor oral intake in the setting of loop diuretic administration (takes Bumex at home). Resolving   3. HTN, on lopressor and amlodipine at home  4. Vitamin D deficiency, on ergocalciferol at home  5. MBD of CKD, on sevelamer (hold until phosphorus level obtained)  6. Anemia of CKD,  Hgb 8.2 mg/dL today, start epoetin alpha 3,000 unit 3 times/wk  7. Hemodynamic shock, requiring the use of vasopressor support to maintain MAP >65  8. Type I DM, poorly controlled with frequent readmissions for DKA  ------------------------------------------------------  9. Covid 19, unvaccinated  10. Acute hypoxic respiratory failure 2/2 viral pneumonia from covid 19  11. Restless leg syndrome, on ropinirole at home  12. Ascites, unknown etiology, s/p paracentesis September 2021. LFT within normal limits  13. Depression, on Abilify  14. Hypothyroidism, on levothyroxine   15.  History of gastroparesis, on metoclopramide         Plan:    · Continue HD TTS while inpatient, for dialysis tomorrow  · Continue epoetin alpha 3,000 units 3 times/wk  · Continue SPA 25 gm iv with each HD  · Replace potassium prn  · Replace calcium prn  · Replace magnesium prn  · Monitor labs daily  · Monitor glucose levels  · Monitor ionized calcium levels daily,STAT ionized calcium      Electronically signed by HEBER Moreira CNP on 1/5/2022 at 8:54 AM

## 2022-01-06 ENCOUNTER — CARE COORDINATION (OUTPATIENT)
Dept: CASE MANAGEMENT | Age: 32
End: 2022-01-06

## 2022-01-06 LAB
BLOOD CULTURE, ROUTINE: NORMAL
CULTURE, BLOOD 2: NORMAL

## 2022-01-06 NOTE — CARE COORDINATION
[facility] Transitions Initial Follow Up Call    Call within 2 business days of discharge: Yes    Patient: James Gama Patient : 1990   MRN: [id number]  Reason for Admission: DKA/COVID-19+  Discharge Date: 22 RARS: Readmission Risk Score: 41.9 ( )      Last Discharge [facility]       Complaint Diagnosis Description Type Department Provider    21 Hyperglycemia Type 1 diabetes mellitus with ketoacidosis without coma ([facility] . .. ED to Hosp-Admission (Discharged) (ADMITTED) [facility] [address] [place] [address]; Mg Pina... Attempted to contact patient today 22 for hospital discharge/COVID-19+ follow up. Patient answers phone but call quickly disconnected after CTN introduction. CTN attempted to call back and LM on home/mobile number requesting a return call back to CTN and provided contact information.      Karen Mcdaniel, APRN

## 2022-01-06 NOTE — PROGRESS NOTES
CLINICAL PHARMACY NOTE: MEDS TO BEDS    Total # of Prescriptions Filled: 2   The following medications were delivered to the patient:  · Doxycycline hyclate 100 mg capsules  · Ventolin inhaler    Additional Documentation:

## 2022-01-07 ENCOUNTER — CARE COORDINATION (OUTPATIENT)
Dept: CASE MANAGEMENT | Age: 32
End: 2022-01-07

## 2022-01-07 NOTE — CARE COORDINATION
[facility] Transitions Initial Follow Up Call    Call within 2 business days of discharge: Yes    Patient: Theo Corona Patient : 1990   MRN: [id number]  Reason for Admission: DKA/COVID-19+  Discharge Date: 22 RARS: Readmission Risk Score: 41.9 ( )      Last Discharge [facility]       Complaint Diagnosis Description Type Department Provider    21 Hyperglycemia Type 1 diabetes mellitus with ketoacidosis without coma ([facility] . .. ED to Hosp-Admission (Discharged) (ADMITTED) [facility] [address] [place] [address]; Daniel Corea... Attempted to contact patient today 22 for hospital discharge/COVID-19+ follow up call. Left message requesting a return call back to CTN and provided contact information. CTN signing off if no return call.       HEBER Butler

## 2022-01-14 ENCOUNTER — HOSPITAL ENCOUNTER (INPATIENT)
Age: 32
LOS: 13 days | Discharge: NURSING, REHAB OR LONG-TERM CARE | DRG: 420 | End: 2022-01-28
Attending: EMERGENCY MEDICINE | Admitting: FAMILY MEDICINE
Payer: COMMERCIAL

## 2022-01-14 ENCOUNTER — APPOINTMENT (OUTPATIENT)
Dept: GENERAL RADIOLOGY | Age: 32
DRG: 420 | End: 2022-01-14
Payer: COMMERCIAL

## 2022-01-14 DIAGNOSIS — R11.2 NAUSEA AND VOMITING, INTRACTABILITY OF VOMITING NOT SPECIFIED, UNSPECIFIED VOMITING TYPE: ICD-10-CM

## 2022-01-14 DIAGNOSIS — E10.10 TYPE 1 DIABETES MELLITUS WITH KETOACIDOSIS WITHOUT COMA (HCC): ICD-10-CM

## 2022-01-14 DIAGNOSIS — E10.65 UNCONTROLLED TYPE 1 DIABETES MELLITUS WITH HYPERGLYCEMIA (HCC): Primary | ICD-10-CM

## 2022-01-14 LAB
ALBUMIN SERPL-MCNC: 3.4 G/DL (ref 3.5–5.2)
ALP BLD-CCNC: 204 U/L (ref 35–104)
ALT SERPL-CCNC: 14 U/L (ref 0–32)
ANION GAP SERPL CALCULATED.3IONS-SCNC: 18 MMOL/L (ref 7–16)
ANION GAP SERPL CALCULATED.3IONS-SCNC: 19 MMOL/L (ref 7–16)
AST SERPL-CCNC: 22 U/L (ref 0–31)
BASOPHILS ABSOLUTE: 0.03 E9/L (ref 0–0.2)
BASOPHILS RELATIVE PERCENT: 0.4 % (ref 0–2)
BETA-HYDROXYBUTYRATE: >4.5 MMOL/L (ref 0.02–0.27)
BILIRUB SERPL-MCNC: 0.4 MG/DL (ref 0–1.2)
BUN BLDV-MCNC: 13 MG/DL (ref 6–20)
BUN BLDV-MCNC: 13 MG/DL (ref 6–20)
CALCIUM SERPL-MCNC: 7.4 MG/DL (ref 8.6–10.2)
CALCIUM SERPL-MCNC: 8.8 MG/DL (ref 8.6–10.2)
CHLORIDE BLD-SCNC: 90 MMOL/L (ref 98–107)
CHLORIDE BLD-SCNC: 97 MMOL/L (ref 98–107)
CO2: 20 MMOL/L (ref 22–29)
CO2: 23 MMOL/L (ref 22–29)
CREAT SERPL-MCNC: 4 MG/DL (ref 0.5–1)
CREAT SERPL-MCNC: 4.5 MG/DL (ref 0.5–1)
EOSINOPHILS ABSOLUTE: 0.05 E9/L (ref 0.05–0.5)
EOSINOPHILS RELATIVE PERCENT: 0.7 % (ref 0–6)
GFR AFRICAN AMERICAN: 14
GFR AFRICAN AMERICAN: 16
GFR NON-AFRICAN AMERICAN: 14 ML/MIN/1.73
GFR NON-AFRICAN AMERICAN: 16 ML/MIN/1.73
GLUCOSE BLD-MCNC: 327 MG/DL (ref 74–99)
GLUCOSE BLD-MCNC: 334 MG/DL
GLUCOSE BLD-MCNC: 400 MG/DL (ref 74–99)
HCT VFR BLD CALC: 29.8 % (ref 34–48)
HEMOGLOBIN: 9.6 G/DL (ref 11.5–15.5)
IMMATURE GRANULOCYTES #: 0.03 E9/L
IMMATURE GRANULOCYTES %: 0.4 % (ref 0–5)
LACTIC ACID: 2.3 MMOL/L (ref 0.5–2.2)
LYMPHOCYTES ABSOLUTE: 1.59 E9/L (ref 1.5–4)
LYMPHOCYTES RELATIVE PERCENT: 20.7 % (ref 20–42)
MAGNESIUM: 1.9 MG/DL (ref 1.6–2.6)
MCH RBC QN AUTO: 25.9 PG (ref 26–35)
MCHC RBC AUTO-ENTMCNC: 32.2 % (ref 32–34.5)
MCV RBC AUTO: 80.5 FL (ref 80–99.9)
METER GLUCOSE: 334 MG/DL (ref 74–99)
METER GLUCOSE: 352 MG/DL (ref 74–99)
MONOCYTES ABSOLUTE: 0.25 E9/L (ref 0.1–0.95)
MONOCYTES RELATIVE PERCENT: 3.3 % (ref 2–12)
NEUTROPHILS ABSOLUTE: 5.72 E9/L (ref 1.8–7.3)
NEUTROPHILS RELATIVE PERCENT: 74.5 % (ref 43–80)
PDW BLD-RTO: 19.6 FL (ref 11.5–15)
PH VENOUS: 7.29 (ref 7.35–7.45)
PLATELET # BLD: 166 E9/L (ref 130–450)
PMV BLD AUTO: 11 FL (ref 7–12)
POTASSIUM SERPL-SCNC: 3.2 MMOL/L (ref 3.5–5)
POTASSIUM SERPL-SCNC: 4.2 MMOL/L (ref 3.5–5)
RBC # BLD: 3.7 E12/L (ref 3.5–5.5)
REASON FOR REJECTION: NORMAL
REJECTED TEST: NORMAL
SARS-COV-2, NAAT: NOT DETECTED
SODIUM BLD-SCNC: 132 MMOL/L (ref 132–146)
SODIUM BLD-SCNC: 135 MMOL/L (ref 132–146)
TOTAL PROTEIN: 6.9 G/DL (ref 6.4–8.3)
WBC # BLD: 7.7 E9/L (ref 4.5–11.5)

## 2022-01-14 PROCEDURE — 80053 COMPREHEN METABOLIC PANEL: CPT

## 2022-01-14 PROCEDURE — 96374 THER/PROPH/DIAG INJ IV PUSH: CPT

## 2022-01-14 PROCEDURE — 2580000003 HC RX 258: Performed by: STUDENT IN AN ORGANIZED HEALTH CARE EDUCATION/TRAINING PROGRAM

## 2022-01-14 PROCEDURE — 71045 X-RAY EXAM CHEST 1 VIEW: CPT

## 2022-01-14 PROCEDURE — 82800 BLOOD PH: CPT

## 2022-01-14 PROCEDURE — 82010 KETONE BODYS QUAN: CPT

## 2022-01-14 PROCEDURE — 83735 ASSAY OF MAGNESIUM: CPT

## 2022-01-14 PROCEDURE — 85025 COMPLETE CBC W/AUTO DIFF WBC: CPT

## 2022-01-14 PROCEDURE — 6370000000 HC RX 637 (ALT 250 FOR IP): Performed by: STUDENT IN AN ORGANIZED HEALTH CARE EDUCATION/TRAINING PROGRAM

## 2022-01-14 PROCEDURE — 99283 EMERGENCY DEPT VISIT LOW MDM: CPT

## 2022-01-14 PROCEDURE — 96361 HYDRATE IV INFUSION ADD-ON: CPT

## 2022-01-14 PROCEDURE — 93005 ELECTROCARDIOGRAM TRACING: CPT | Performed by: NURSE PRACTITIONER

## 2022-01-14 PROCEDURE — 83605 ASSAY OF LACTIC ACID: CPT

## 2022-01-14 PROCEDURE — 87635 SARS-COV-2 COVID-19 AMP PRB: CPT

## 2022-01-14 PROCEDURE — 82962 GLUCOSE BLOOD TEST: CPT

## 2022-01-14 PROCEDURE — 36415 COLL VENOUS BLD VENIPUNCTURE: CPT

## 2022-01-14 PROCEDURE — 80048 BASIC METABOLIC PNL TOTAL CA: CPT

## 2022-01-14 RX ORDER — 0.9 % SODIUM CHLORIDE 0.9 %
30 INTRAVENOUS SOLUTION INTRAVENOUS ONCE
Status: COMPLETED | OUTPATIENT
Start: 2022-01-14 | End: 2022-01-14

## 2022-01-14 RX ADMIN — INSULIN HUMAN 10 UNITS: 100 INJECTION, SOLUTION PARENTERAL at 22:05

## 2022-01-14 RX ADMIN — SODIUM CHLORIDE 1905 ML: 9 INJECTION, SOLUTION INTRAVENOUS at 21:12

## 2022-01-14 ASSESSMENT — PAIN SCALES - GENERAL: PAINLEVEL_OUTOF10: 6

## 2022-01-14 ASSESSMENT — PAIN DESCRIPTION - DIRECTION: RADIATING_TOWARDS: GENERALIZED PAIN

## 2022-01-14 NOTE — LETTER
[address] Medicaid  CERTIFICATION OF NECESSITY  FOR TRANSPORTATION   BY WHEELCHAIR VAN     Individual Information   1. Name: Matthew Anderson 2. [place] Medicaid Billing Number:    3. Address: [facility] [address]      Transportation Provider Information   4. Provider Name:       5. [place] Medicaid Provider Number:  National Provider Identifier (NPI):      Certification  7. Criteria:  By signing this document, the practitioner certifies that two statements are true:  A. This individual must be accompanied by a mobility-related assistive device from the point of pick-up to the point of drop-off. B. Transport of this individual by standard passenger vehicle or common carrier is precluded or contraindicated. 8. Period Beginning Date:  01/28/2022   9. Length  [x] Not more than 1 day(s)  [] One Year     Additional Information Relevant to Certification   10. Comments or Explanations, If Necessary or Appropriate     DKA, DM, CHF     Certifying Practitioner Information   11. Name of Practitioner:  Dr Dede Zhu MD   12. [place] Medicaid Provider Number, If Applicable:  [address] Provider Identifier (NPI):      Signature Information   14. Date of Signature:  01/28/2022 15. Name of Person Signing:   Sue Laureano RN.   16. Signature and Professional Designation:   Electronically signed by Sue Laureano RN on 1/28/22 at 2:27 PM EST       OD [id number]  Rev. 7/2015    [facility] Encounter Date/Time: 1/14/2022 2049    Hospital Account: [de-identified]    MRN: [id number]    Patient: Marcy Sr Serial #: [id number]      ENCOUNTER          Patient Class: I Private Enc?   No Unit RM BD: Pandora Hedger [address] Service: Intermediate   Encounter DX: DKA, type 1, not at goal*   ADM Provider: Linus Osorio DO   Procedure:     ATT Provider: Dede Zhu MD   REF Provider:        Admission DX: DKA, type 1, not at goal [facility]), Type 1 diabetes mellitus with ketoacidosis without coma ([facility] Nausea and vomiting, intractability of vomiting not specified, unspecified vomiting type and DX codes: E10.10, E10.10, R11.2      PATIENT                 Name: Fortino Hay : 1990 (31 yrs)   Address: [address] [place] [address] Sex: Female   City: [place] [address]         Marital Status: Single   Employer: NOT EMPLOYED         Mu-ism: None   Primary Care Provider: Charlene Simpson MD         Primary Phone: [phone]   EMERGENCY CONTACT   Contact Name Legal Guardian? Relationship to Patient Home Phone Work Phone   1. Charleen Aviles  2. *No Contact Specified* No    Parent    [phone]                 GUARANTOR            Guarantor: Fortino Hay     : 1990   Address: [address] [place] [address] Sex: Female     [place] [address]     Relation to Patient: Self       Home Phone: [phone]   Guarantor ID: [id number]       Work Phone:     Guarantor Employer: NOT EMPLOYED         Status: NOT EMPLO*      COVERAGE        PRIMARY INSURANCE   Payor: Marnie Savage Plan: [facility] MEDICAID   Payor Address: CLAIMS DEPARTMENT; [address] [place] [address]*,  [address] [place] [address], [address] [place] [address]       Group Number: [id number] Insurance Type: INDEMNITY   Subscriber Name: Grisel Canela : 1990   Subscriber ID: [id number] Pat. Rel. to Sub: Self   SECONDARY INSURANCE   Payor:   Plan:     Payor Address:  ,           Group Number:   Insurance Type:     Subscriber Name:   Subscriber :     Subscriber ID:   Pat.  Rel. to Sub:             CSN: [id number]

## 2022-01-15 LAB
ANION GAP SERPL CALCULATED.3IONS-SCNC: 10 MMOL/L (ref 7–16)
ANION GAP SERPL CALCULATED.3IONS-SCNC: 11 MMOL/L (ref 7–16)
ANION GAP SERPL CALCULATED.3IONS-SCNC: 9 MMOL/L (ref 7–16)
BUN BLDV-MCNC: 13 MG/DL (ref 6–20)
BUN BLDV-MCNC: 14 MG/DL (ref 6–20)
BUN BLDV-MCNC: 6 MG/DL (ref 6–20)
CALCIUM SERPL-MCNC: 7.4 MG/DL (ref 8.6–10.2)
CALCIUM SERPL-MCNC: 7.6 MG/DL (ref 8.6–10.2)
CALCIUM SERPL-MCNC: 7.6 MG/DL (ref 8.6–10.2)
CHLORIDE BLD-SCNC: 96 MMOL/L (ref 98–107)
CHLORIDE BLD-SCNC: 97 MMOL/L (ref 98–107)
CHLORIDE BLD-SCNC: 98 MMOL/L (ref 98–107)
CO2: 24 MMOL/L (ref 22–29)
CO2: 24 MMOL/L (ref 22–29)
CO2: 25 MMOL/L (ref 22–29)
CREAT SERPL-MCNC: 2.5 MG/DL (ref 0.5–1)
CREAT SERPL-MCNC: 4.2 MG/DL (ref 0.5–1)
CREAT SERPL-MCNC: 4.5 MG/DL (ref 0.5–1)
GFR AFRICAN AMERICAN: 14
GFR AFRICAN AMERICAN: 15
GFR AFRICAN AMERICAN: 27
GFR NON-AFRICAN AMERICAN: 14 ML/MIN/1.73
GFR NON-AFRICAN AMERICAN: 15 ML/MIN/1.73
GFR NON-AFRICAN AMERICAN: 27 ML/MIN/1.73
GLUCOSE BLD-MCNC: 123 MG/DL
GLUCOSE BLD-MCNC: 146 MG/DL
GLUCOSE BLD-MCNC: 150 MG/DL (ref 74–99)
GLUCOSE BLD-MCNC: 167 MG/DL (ref 74–99)
GLUCOSE BLD-MCNC: 182 MG/DL (ref 74–99)
MAGNESIUM: 1.6 MG/DL (ref 1.6–2.6)
MAGNESIUM: 1.7 MG/DL (ref 1.6–2.6)
METER GLUCOSE: 115 MG/DL (ref 74–99)
METER GLUCOSE: 123 MG/DL (ref 74–99)
METER GLUCOSE: 146 MG/DL (ref 74–99)
METER GLUCOSE: 170 MG/DL (ref 74–99)
PH VENOUS: 7.31 (ref 7.35–7.45)
PHOSPHORUS: 2.5 MG/DL (ref 2.5–4.5)
PHOSPHORUS: 2.7 MG/DL (ref 2.5–4.5)
POTASSIUM SERPL-SCNC: 2.9 MMOL/L (ref 3.5–5)
POTASSIUM SERPL-SCNC: 3.1 MMOL/L (ref 3.5–5)
POTASSIUM SERPL-SCNC: 3.5 MMOL/L (ref 3.5–5)
SODIUM BLD-SCNC: 131 MMOL/L (ref 132–146)
SODIUM BLD-SCNC: 131 MMOL/L (ref 132–146)
SODIUM BLD-SCNC: 132 MMOL/L (ref 132–146)
TROPONIN, HIGH SENSITIVITY: 157 NG/L (ref 0–9)
TROPONIN, HIGH SENSITIVITY: 162 NG/L (ref 0–9)

## 2022-01-15 PROCEDURE — 84484 ASSAY OF TROPONIN QUANT: CPT

## 2022-01-15 PROCEDURE — 2580000003 HC RX 258: Performed by: STUDENT IN AN ORGANIZED HEALTH CARE EDUCATION/TRAINING PROGRAM

## 2022-01-15 PROCEDURE — 6360000002 HC RX W HCPCS: Performed by: FAMILY MEDICINE

## 2022-01-15 PROCEDURE — 96376 TX/PRO/DX INJ SAME DRUG ADON: CPT

## 2022-01-15 PROCEDURE — 2580000003 HC RX 258: Performed by: FAMILY MEDICINE

## 2022-01-15 PROCEDURE — 6370000000 HC RX 637 (ALT 250 FOR IP): Performed by: FAMILY MEDICINE

## 2022-01-15 PROCEDURE — 2580000003 HC RX 258: Performed by: NURSE PRACTITIONER

## 2022-01-15 PROCEDURE — 99254 IP/OBS CNSLTJ NEW/EST MOD 60: CPT | Performed by: INTERNAL MEDICINE

## 2022-01-15 PROCEDURE — 90935 HEMODIALYSIS ONE EVALUATION: CPT

## 2022-01-15 PROCEDURE — 6370000000 HC RX 637 (ALT 250 FOR IP)

## 2022-01-15 PROCEDURE — 2060000000 HC ICU INTERMEDIATE R&B

## 2022-01-15 PROCEDURE — 82962 GLUCOSE BLOOD TEST: CPT

## 2022-01-15 PROCEDURE — 6370000000 HC RX 637 (ALT 250 FOR IP): Performed by: INTERNAL MEDICINE

## 2022-01-15 PROCEDURE — 83735 ASSAY OF MAGNESIUM: CPT

## 2022-01-15 PROCEDURE — 6370000000 HC RX 637 (ALT 250 FOR IP): Performed by: STUDENT IN AN ORGANIZED HEALTH CARE EDUCATION/TRAINING PROGRAM

## 2022-01-15 PROCEDURE — 6360000002 HC RX W HCPCS: Performed by: NURSE PRACTITIONER

## 2022-01-15 PROCEDURE — 80048 BASIC METABOLIC PNL TOTAL CA: CPT

## 2022-01-15 PROCEDURE — 84100 ASSAY OF PHOSPHORUS: CPT

## 2022-01-15 PROCEDURE — 36415 COLL VENOUS BLD VENIPUNCTURE: CPT

## 2022-01-15 RX ORDER — PANTOPRAZOLE SODIUM 40 MG/1
40 TABLET, DELAYED RELEASE ORAL
Status: DISCONTINUED | OUTPATIENT
Start: 2022-01-15 | End: 2022-01-28 | Stop reason: HOSPADM

## 2022-01-15 RX ORDER — ARIPIPRAZOLE 5 MG/1
5 TABLET ORAL NIGHTLY
Status: DISCONTINUED | OUTPATIENT
Start: 2022-01-15 | End: 2022-01-28 | Stop reason: HOSPADM

## 2022-01-15 RX ORDER — POTASSIUM CHLORIDE 7.45 MG/ML
10 INJECTION INTRAVENOUS PRN
Status: DISCONTINUED | OUTPATIENT
Start: 2022-01-15 | End: 2022-01-15

## 2022-01-15 RX ORDER — POTASSIUM CHLORIDE 7.45 MG/ML
10 INJECTION INTRAVENOUS
Status: ACTIVE | OUTPATIENT
Start: 2022-01-15 | End: 2022-01-15

## 2022-01-15 RX ORDER — INSULIN GLARGINE 100 [IU]/ML
9 INJECTION, SOLUTION SUBCUTANEOUS EVERY MORNING
Status: DISCONTINUED | OUTPATIENT
Start: 2022-01-15 | End: 2022-01-15

## 2022-01-15 RX ORDER — DEXTROSE AND SODIUM CHLORIDE 5; .45 G/100ML; G/100ML
INJECTION, SOLUTION INTRAVENOUS CONTINUOUS PRN
Status: DISCONTINUED | OUTPATIENT
Start: 2022-01-15 | End: 2022-01-15 | Stop reason: SDUPTHER

## 2022-01-15 RX ORDER — INSULIN GLARGINE 100 [IU]/ML
7 INJECTION, SOLUTION SUBCUTANEOUS EVERY MORNING
Status: CANCELLED | OUTPATIENT
Start: 2022-01-16

## 2022-01-15 RX ORDER — POLYETHYLENE GLYCOL 3350 17 G/17G
17 POWDER, FOR SOLUTION ORAL DAILY PRN
Status: DISCONTINUED | OUTPATIENT
Start: 2022-01-15 | End: 2022-01-28 | Stop reason: HOSPADM

## 2022-01-15 RX ORDER — DEXTROSE MONOHYDRATE 25 G/50ML
12.5 INJECTION, SOLUTION INTRAVENOUS PRN
Status: DISCONTINUED | OUTPATIENT
Start: 2022-01-15 | End: 2022-01-28 | Stop reason: HOSPADM

## 2022-01-15 RX ORDER — SODIUM CHLORIDE 450 MG/100ML
INJECTION, SOLUTION INTRAVENOUS CONTINUOUS
Status: DISCONTINUED | OUTPATIENT
Start: 2022-01-15 | End: 2022-01-15

## 2022-01-15 RX ORDER — SODIUM CHLORIDE 450 MG/100ML
INJECTION, SOLUTION INTRAVENOUS CONTINUOUS
Status: DISCONTINUED | OUTPATIENT
Start: 2022-01-15 | End: 2022-01-15 | Stop reason: SDUPTHER

## 2022-01-15 RX ORDER — FENTANYL CITRATE 50 UG/ML
25 INJECTION, SOLUTION INTRAMUSCULAR; INTRAVENOUS
Status: DISCONTINUED | OUTPATIENT
Start: 2022-01-15 | End: 2022-01-16

## 2022-01-15 RX ORDER — PREGABALIN 75 MG/1
75 CAPSULE ORAL DAILY
Status: ON HOLD | COMMUNITY
End: 2022-01-28 | Stop reason: HOSPADM

## 2022-01-15 RX ORDER — DEXTROSE AND SODIUM CHLORIDE 5; .45 G/100ML; G/100ML
INJECTION, SOLUTION INTRAVENOUS CONTINUOUS PRN
Status: DISCONTINUED | OUTPATIENT
Start: 2022-01-15 | End: 2022-01-15

## 2022-01-15 RX ORDER — ONDANSETRON 4 MG/1
4 TABLET, FILM COATED ORAL EVERY 8 HOURS PRN
Status: ON HOLD | COMMUNITY
End: 2022-04-15 | Stop reason: SDUPTHER

## 2022-01-15 RX ORDER — LEVOTHYROXINE SODIUM 0.07 MG/1
75 TABLET ORAL DAILY
Status: DISCONTINUED | OUTPATIENT
Start: 2022-01-15 | End: 2022-01-28 | Stop reason: HOSPADM

## 2022-01-15 RX ORDER — INSULIN GLARGINE 100 [IU]/ML
0.15 INJECTION, SOLUTION SUBCUTANEOUS NIGHTLY
Status: DISCONTINUED | OUTPATIENT
Start: 2022-01-15 | End: 2022-01-15

## 2022-01-15 RX ORDER — PROMETHAZINE HYDROCHLORIDE 25 MG/1
25 TABLET ORAL EVERY 6 HOURS PRN
Status: ON HOLD | COMMUNITY
End: 2022-04-15 | Stop reason: SDUPTHER

## 2022-01-15 RX ORDER — MAGNESIUM SULFATE 1 G/100ML
1000 INJECTION INTRAVENOUS PRN
Status: DISCONTINUED | OUTPATIENT
Start: 2022-01-15 | End: 2022-01-15

## 2022-01-15 RX ORDER — INSULIN GLARGINE 100 [IU]/ML
7 INJECTION, SOLUTION SUBCUTANEOUS EVERY MORNING
Status: DISCONTINUED | OUTPATIENT
Start: 2022-01-15 | End: 2022-01-15

## 2022-01-15 RX ORDER — ROPINIROLE 0.25 MG/1
0.25 TABLET, FILM COATED ORAL NIGHTLY
Status: DISCONTINUED | OUTPATIENT
Start: 2022-01-15 | End: 2022-01-28 | Stop reason: HOSPADM

## 2022-01-15 RX ORDER — INSULIN GLARGINE 100 [IU]/ML
7 INJECTION, SOLUTION SUBCUTANEOUS DAILY
Status: DISCONTINUED | OUTPATIENT
Start: 2022-01-15 | End: 2022-01-16

## 2022-01-15 RX ORDER — DEXTROSE MONOHYDRATE 25 G/50ML
12.5 INJECTION, SOLUTION INTRAVENOUS PRN
Status: DISCONTINUED | OUTPATIENT
Start: 2022-01-15 | End: 2022-01-15

## 2022-01-15 RX ORDER — HEPARIN SODIUM 10000 [USP'U]/ML
5000 INJECTION, SOLUTION INTRAVENOUS; SUBCUTANEOUS EVERY 8 HOURS
Status: DISCONTINUED | OUTPATIENT
Start: 2022-01-15 | End: 2022-01-28 | Stop reason: HOSPADM

## 2022-01-15 RX ORDER — MIRTAZAPINE 15 MG/1
15 TABLET, FILM COATED ORAL NIGHTLY
Status: DISCONTINUED | OUTPATIENT
Start: 2022-01-15 | End: 2022-01-28 | Stop reason: HOSPADM

## 2022-01-15 RX ADMIN — FENTANYL CITRATE 25 MCG: 0.05 INJECTION, SOLUTION INTRAMUSCULAR; INTRAVENOUS at 20:55

## 2022-01-15 RX ADMIN — CALCIUM GLUCONATE 1000 MG: 98 INJECTION, SOLUTION INTRAVENOUS at 20:55

## 2022-01-15 RX ADMIN — INSULIN HUMAN 10 UNITS: 100 INJECTION, SOLUTION PARENTERAL at 00:19

## 2022-01-15 RX ADMIN — MIRTAZAPINE 15 MG: 15 TABLET, FILM COATED ORAL at 21:43

## 2022-01-15 RX ADMIN — Medication 10 UNITS: at 00:19

## 2022-01-15 RX ADMIN — DEXTROSE AND SODIUM CHLORIDE: 5; 450 INJECTION, SOLUTION INTRAVENOUS at 02:42

## 2022-01-15 RX ADMIN — SODIUM CHLORIDE 6.4 UNITS/HR: 9 INJECTION, SOLUTION INTRAVENOUS at 01:16

## 2022-01-15 RX ADMIN — HEPARIN SODIUM 5000 UNITS: 10000 INJECTION INTRAVENOUS; SUBCUTANEOUS at 05:34

## 2022-01-15 RX ADMIN — INSULIN LISPRO 1 UNITS: 100 INJECTION, SOLUTION INTRAVENOUS; SUBCUTANEOUS at 17:07

## 2022-01-15 RX ADMIN — MIRTAZAPINE 15 MG: 15 TABLET, FILM COATED ORAL at 02:59

## 2022-01-15 RX ADMIN — SODIUM CHLORIDE: 4.5 INJECTION, SOLUTION INTRAVENOUS at 01:50

## 2022-01-15 RX ADMIN — SODIUM CHLORIDE: 4.5 INJECTION, SOLUTION INTRAVENOUS at 01:15

## 2022-01-15 RX ADMIN — PANTOPRAZOLE SODIUM 40 MG: 40 TABLET, DELAYED RELEASE ORAL at 17:07

## 2022-01-15 RX ADMIN — ARIPIPRAZOLE 5 MG: 5 TABLET ORAL at 02:59

## 2022-01-15 RX ADMIN — HEPARIN SODIUM 5000 UNITS: 10000 INJECTION INTRAVENOUS; SUBCUTANEOUS at 21:58

## 2022-01-15 RX ADMIN — ARIPIPRAZOLE 5 MG: 5 TABLET ORAL at 22:51

## 2022-01-15 RX ADMIN — ROPINIROLE 0.25 MG: 0.25 TABLET, FILM COATED ORAL at 22:51

## 2022-01-15 RX ADMIN — ROPINIROLE 0.25 MG: 0.25 TABLET, FILM COATED ORAL at 02:59

## 2022-01-15 RX ADMIN — INSULIN GLARGINE 7 UNITS: 100 INJECTION, SOLUTION SUBCUTANEOUS at 17:07

## 2022-01-15 RX ADMIN — SODIUM CHLORIDE 6.4 UNITS/HR: 9 INJECTION, SOLUTION INTRAVENOUS at 01:17

## 2022-01-15 RX ADMIN — FENTANYL CITRATE 25 MCG: 0.05 INJECTION, SOLUTION INTRAMUSCULAR; INTRAVENOUS at 05:34

## 2022-01-15 ASSESSMENT — ENCOUNTER SYMPTOMS
SORE THROAT: 0
EYE DISCHARGE: 0
BACK PAIN: 0
WHEEZING: 0
SHORTNESS OF BREATH: 0
ABDOMINAL DISTENTION: 0
COUGH: 0
VOMITING: 1
EYE PAIN: 0
DIARRHEA: 0
EYE REDNESS: 0
NAUSEA: 1
SINUS PRESSURE: 0

## 2022-01-15 ASSESSMENT — PAIN DESCRIPTION - LOCATION: LOCATION: BACK;GENERALIZED

## 2022-01-15 ASSESSMENT — PAIN SCALES - GENERAL
PAINLEVEL_OUTOF10: 0
PAINLEVEL_OUTOF10: 7
PAINLEVEL_OUTOF10: 8
PAINLEVEL_OUTOF10: 0
PAINLEVEL_OUTOF10: 8

## 2022-01-15 ASSESSMENT — PAIN DESCRIPTION - PAIN TYPE: TYPE: CHRONIC PAIN

## 2022-01-15 NOTE — ED NOTES
Dr. Karena Mejia paged regarding k level 2.9=awaiting call back.       Sarai Bowden RN  01/15/22 6198 [facility] Melvi Gonzalez RN  01/15/22 7702

## 2022-01-15 NOTE — ED NOTES
Department of Emergency Medicine  FIRST PROVIDER TRIAGE NOTE             Independent MLP           1/14/22  8:49 PM EST    Date of Encounter: 1/14/22   MRN: [id number]      HPI: Tavo Wolf is a 36 y.o. female who presents to the ED for Emesis  Patient presents continued. Patient actually admitted December 31 through January. States the discharge still was not able to hold anything down reports emesis x7, history of DKA, reports blood sugar running high again today 438, did take some insulin. Patient was COVID-19 + December 31    ROS: Negative for cp or sob. PE: Gen Appearance/Constitutional: alert  HEENT: NC/NT. PERRLA,  Airway patent. Initial Plan of Care: All treatment areas with department are currently occupied. Plan to order/Initiate the following while awaiting opening in ED: labs, EKG and imaging studies.     Initial Plan of Care: Initiate Treatment-Testing, Proceed toTreatment Area When Bed Available for ED Attending/MLP to Continue Care    Electronically signed by HEBER Blakely CNP   DD: 1/14/22         HEBER Blakely CNP  01/14/22 2051  ATTENDING PROVIDER ATTESTATION:     Supervising Physician, on-site, available for consultation, non-participatory in the evaluation or care of this patient       Trevor Vigil MD  01/14/22 0068

## 2022-01-15 NOTE — ED NOTES
Received call from lab that troponin was rejected.   New order placed, section RN, Lindsay notified     Krystyna Ramachandran RN  01/14/22 7444

## 2022-01-15 NOTE — PROGRESS NOTES
Waiting for updated BMP results to replace potassium levels since the last result was from this AM and pt had HD as well.

## 2022-01-15 NOTE — CONSULTS
Department of Internal Medicine  Nephrology Nurse Practitioner Consult Note      Reason for Consult:  End-Stage Renal Disease on HD  Requesting Physician:  Hemant Piña DO    CHIEF COMPLAINT:  Nausea and Vomiting    History Obtained From:  patient, electronic medical record    HISTORY OF PRESENT ILLNESS:  Briefly, Miss Vitor Atwood is a 36year-old female with a PMH of ESRD on home hemodialysis 5 days/wk, (initiated September 2021, Type I DM, poorly controlled with recurrent admissions for DKA, HTN, gastroparesis, ascites, infective endocarditis, cardiac arrest, hypothyroidism and depression. She was recently admitted earlier this month after testing positive for COVID 19. She presented to the ER on January 14th, 2022 with complaints of nausea and vomiting for one month and hyperglycemia. She states she has not been compliant with insulin therapy recently. In the ER she was found to be in DKA with blood sugar of 400 And beta hydroxy greater than 4.5. Other pertinent lab work revealed sodium 132, chloride 92, anion gap 19, BUN 13, Creatinine 4.5, lactic acid 2.3. She was started on DKA protocol and IVFs in the ER. Renal is consulted for ESRD on HD and DKA.                Past Medical History:        Diagnosis Date    Acute congestive heart failure ([address]     BC (acute kidney injury) ([address] 10/01/2019    Cardiac arrest ([address] 02/15/2021    Cephalgia 10/09/2019    Chronic kidney disease     Depression     Diabetes mellitus ([address]     Diabetic gastroparesis associated with type 1 diabetes mellitus ([address] 12/17/2018    Diabetic ketoacidosis ([address] 08/27/2011    Diabetic ketoacidosis with coma associated with type 1 diabetes mellitus ([address] 06/26/2013    Diabetic polyneuropathy associated with type 1 diabetes mellitus ([address] 12/27/2020    Drug use complicating pregnancy in third trimester     Endocarditis 10/31/2020    ESRD (end stage renal disease) ([address] 09/29/2020    H/O cardiovascular stress test 08/27/2021 Freddy Sears    Hemodialysis patient [facility])     History of blood transfusion 2019    Hyperlipidemia 10/08/2020    Hyperosmolar hyperglycemic state (HHS) ([facility] 2020    Hypothyroidism 10/08/2020    Iron deficiency anemia 10/01/2019    MDRO (multiple drug resistant organisms) resistance     MRSA (methicillin resistant Staphylococcus aureus)     back wound abcess    Non compliance w medication regimen 2016    Other disorders of kidney and ureter     Pregnancy 2016    16 weeks    Previous  delivery affecting pregnancy, antepartum 2017    Previous stillbirth or demise, antepartum 2016    Seizure ([facility] 2020    Severe pre-eclampsia in third trimester 2016    Shock liver 02/15/2021    Valvular endocarditis 11/10/2020    This Diagnosis was added to the Problem List based on transcribed orders from Dr. Johnathan Almonte        Past Surgical History:        Procedure Laterality Date    BACK SURGERY      abscess    CATHETER REMOVAL N/A 10/1/2021    PERITONEAL CATHETER REMOVAL [facility]      x2   Naif Mohs, LAPAROSCOPIC N/A 2019    CHOLECYSTECTOMY LAPAROSCOPIC performed by Perez Zaldivar MD at [address] N/A 2018    COLONOSCOPY WITH BIOPSY performed by Didi Dockery MD at [facility] N/A 2018    COLONOSCOPY WITH BIOPSY performed by Remigio Johnson MD at [address] [place] [address] ECHO COMPL W [address]  2012    EF 57%    ECHO COMPL W DOP COLOR FLOW  6/10/2013         EMBOLECTOMY N/A 2020    ANGIOVAC, VEGECTOMY, YULISSA -- REQS ROOM 3 performed by Briseida Denise MD at [facility] Left 2021    LEFT LEG INCISION AND DRAINAGE, DEBRIDEMENT, WOUND VAC APPLICATION performed by Wayne Garcia DPM at [facility] Left 2021    LEFT LEG DEBRIDEMENT BIOPSY POSS APPLICATION WOUND VAC performed by Ramy Monte Rinne, DPM at [address] [place] [address] LAPAROSCOPY INSERTION PERITONEAL CATHETER N/A 6/26/2020    LAPAROSCOPIC INSERTION PERITONEAL DIALYSIS CATHETER performed by Maria Eugenia Lin MD at [facility] ESOPHAGOGASTRODUODENOSCOPY TRANSORAL DIAGNOSTIC N/A 5/30/2018    EGD ESOPHAGOGASTRODUODENOSCOPY performed by Kay Goltz, MD at [address] [place] [address] TRANSESOPHAGEAL ECHOCARDIOGRAM N/A 10/19/2020    TRANSESOPHAGEAL ECHOCARDIOGRAM WITH BUBBLE STUDY performed by Kalina Bowman MD at [address] [place] [address] TUNNELED VENOUS PORT PLACEMENT  04/2018    UPPER GASTROINTESTINAL ENDOSCOPY  12/18/2018    EGD BIOPSY performed by Clarita Goodell, MD at [place] [address] N/A 10/11/2019    EGD ESOPHAGOGASTRODUODENOSCOPY performed by Jessica Kathleen DO at [place] [address] N/A 7/14/2021    EGD BIOPSY performed by Navi Osei MD at [place] [address]     Current Medications:    Current Facility-Administered Medications: insulin regular (HUMULIN R;NOVOLIN R) 100 UNIT/ML injection, , ,   ARIPiprazole (ABILIFY) tablet 5 mg, 5 mg, Oral, Nightly  levothyroxine (SYNTHROID) tablet 75 mcg, 75 mcg, Oral, Daily  mirtazapine (REMERON) tablet 15 mg, 15 mg, Oral, Nightly  pantoprazole (PROTONIX) tablet 40 mg, 40 mg, Oral, BID AC  rOPINIRole (REQUIP) tablet 0.25 mg, 0.25 mg, Oral, Nightly  dextrose 50 % IV solution, 12.5 g, IntraVENous, PRN  potassium chloride 10 mEq/100 mL IVPB (Peripheral Line), 10 mEq, IntraVENous, PRN  magnesium sulfate 1000 mg in dextrose 5% 100 mL IVPB, 1,000 mg, IntraVENous, PRN  sodium phosphate 10 mmol in dextrose 5 % 250 mL IVPB, 10 mmol, IntraVENous, PRN **OR** sodium phosphate 15 mmol in dextrose 5 % 250 mL IVPB, 15 mmol, IntraVENous, PRN **OR** sodium phosphate 20 mmol in dextrose 5 % 500 mL IVPB, 20 mmol, IntraVENous, PRN  polyethylene glycol (GLYCOLAX) packet 17 g, 17 g, Oral, Daily PRN  0.45 % sodium chloride infusion, , IntraVENous, Continuous  dextrose 5 % and 0.45 % sodium chloride infusion, , IntraVENous, Continuous PRN  insulin regular (HUMULIN R;NOVOLIN R) 100 Units in sodium chloride 0.9 % 100 mL infusion, 0.1 Units/kg/hr, IntraVENous, Continuous  heparin (porcine) injection 5,000 Units, 5,000 Units, SubCUTAneous, Q8H  fentaNYL (SUBLIMAZE) injection 25 mcg, 25 mcg, IntraVENous, Q1H PRN  insulin lispro (HUMALOG) injection vial 0-6 Units, 0-6 Units, SubCUTAneous, TID WC  insulin lispro (HUMALOG) injection vial 0-3 Units, 0-3 Units, SubCUTAneous, Nightly  insulin glargine (LANTUS) injection vial 7 Units, 7 Units, SubCUTAneous, QAM  Allergies:  Cefepime and Toradol [ketorolac tromethamine]    Social History:    TOBACCO:   reports that she quit smoking about a year ago. Her smoking use included cigarettes. She has a 3.00 pack-year smoking history. She has never used smokeless tobacco.  ETOH:   reports no history of alcohol use. DRUGS:   reports current drug use. Drug: Opiates .     Family History:       Problem Relation Age of Onset   Luis Capone Asthma Mother     Hypertension Mother     High Blood Pressure Mother     Diabetes Mother     Asthma Brother     High Blood Pressure Father      REVIEW OF SYSTEMS:    CONSTITUTIONAL:  positive for  fatigue and malaise  EYES:  negative  HEENT:  negative for  hearing loss and epistaxis  RESPIRATORY:  positive for dyspnea  CARDIOVASCULAR:  negative for  chest pain, dyspnea  GASTROINTESTINAL:  positive for nausea and vomiting and abdominal pain  GENITOURINARY:  negative  INTEGUMENT/BREAST:  negative  HEMATOLOGIC/LYMPHATIC:  negative  ALLERGIC/IMMUNOLOGIC:  positive for drug reactions  ENDOCRINE:  positive for weight changes    MUSCULOSKELETAL:  negative  NEUROLOGICAL:  negative    PHYSICAL EXAM:      Vitals:    VITALS:  BP (!) 116/94   Pulse 89   Temp 99 °F (37.2 °C) (Oral)   Resp 18   Ht 5' 4\" (1.626 m)   Wt 140 lb (63.5 kg)   SpO2 95%   BMI 24.03 kg/m²   24HR INTAKE/OUTPUT:  No intake or output data in the 24 hours ending 01/15/22 Joey 8 tunneled dialysis catheter  Constitutional:  Lethargic, but wakens to voice  HEENT:  PERRL, normocephalic, atraumatic  Respiratory:  CTA bilateral   Cardiovascular/Edema:  ST, RRR, no murmur gallop or rub  Gastrointestinal:  abd distended, c/o persistent abdominal pain  Neurologic:  Lethargic, awakens to voice, follows commands, no focal deficit  Skin:  Warm, dry, ecchymotic areas to abdomen    DATA:    CBC with Differential:    Lab Results   Component Value Date    WBC 7.7 01/14/2022    RBC 3.70 01/14/2022    HGB 9.6 01/14/2022    HCT 29.8 01/14/2022     01/14/2022    MCV 80.5 01/14/2022    MCH 25.9 01/14/2022    MCHC 32.2 01/14/2022    RDW 19.6 01/14/2022    NRBC 0.9 08/10/2020    SEGSPCT 67 06/27/2013    METASPCT 1.7 08/10/2020    LYMPHOPCT 20.7 01/14/2022    MONOPCT 3.3 01/14/2022    BASOPCT 0.4 01/14/2022    MONOSABS 0.25 01/14/2022    LYMPHSABS 1.59 01/14/2022    EOSABS 0.05 01/14/2022    BASOSABS 0.03 01/14/2022     CMP:    Lab Results   Component Value Date     01/15/2022    K 2.9 01/15/2022    K 4.2 11/24/2021    CL 98 01/15/2022    CO2 25 01/15/2022    BUN 14 01/15/2022    CREATININE 4.5 01/15/2022    GFRAA 14 01/15/2022    LABGLOM 14 01/15/2022    GLUCOSE 150 01/15/2022    GLUCOSE 130 05/18/2012    PROT 6.9 01/14/2022    LABALBU 3.4 01/14/2022    LABALBU 4.1 05/18/2012    CALCIUM 7.6 01/15/2022    BILITOT 0.4 01/14/2022    ALKPHOS 204 01/14/2022    AST 22 01/14/2022    ALT 14 01/14/2022     Magnesium:    Lab Results   Component Value Date    MG 1.7 01/15/2022     Phosphorus:    Lab Results   Component Value Date    PHOS 2.7 01/15/2022     Radiology Review:                Chest Xray 1/14/22   Suspect early bibasilar infiltrates.          IMPRESSION/RECOMMENDATIONS:  Briefly, Miss Porsche Mariscal is a 36year-old female with a PMH of ESRD on home hemodialysis 5 days/wk, (initiated September 2021, Type I DM, poorly controlled with recurrent admissions for DKA, HTN, gastroparesis, ascites, infective endocarditis, cardiac arrest, hypothyroidism and depression. She was recently admitted earlier this month after testing positive for COVID 19. She presented to the ER on January 14th, 2022 with complaints of nausea and vomiting for one month and hyperglycemia. She states she has not been compliant with insulin therapy recently. In the ER she was found to be in DKA with blood sugar of 400 And beta hydroxy greater than 4.5. Other pertinent lab work revealed sodium 132, chloride 92, anion gap 19, BUN 13, Creatinine 4.5, lactic acid 2.3. She was started on DKA protocol and IVFs in the ER. Renal is consulted for ESRD on HD and DKA. 1. ESRD on home hemodialysis 4 days/wk (per patient) via RIJ tunneled dialysis catheter. States last HD treatment was Thursday. HD initiated September 2021 after failed peritoneal dialysis with persistent hyperkalemia. For dialysis today.      2. DKA,  beta hydroxy greater than 4.5. S/P insulin drip. Endocrinology following   3. Hyponatremia, 2/2 decreased free water excretion from ESRD. 1L fluid restriction. 2. Hypokalemia, 2/2 GI losses from vomiting and poor oral intake  3. HTN, on lopressor and amlodipine at home  4. Vitamin D deficiency, on ergocalciferol at home  5. 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 Anemia of CKD,  Hgb 9.6 mg/dL today, start epoetin alpha 3,000 unit 3 times/wk  7. Type I DM, poorly controlled with frequent readmissions for DKA  ------------------------------------------------------  9. Hx Covid 19, unvaccinated  10. Restless leg syndrome, on ropinirole  11. Depression, on Abilify  12. Hypothyroidism, on levothyroxine   13. History of gastroparesis, on metoclopramide          Plan:     · HD today and M-W-F while inpatient  · Start epoetin alpha 3,000 units 3 times/wk  · Replace potassium prn  · Replace calcium prn  · Monitor labs daily  · Monitor glucose levels      Thank you very much Jeff Reyes DO for allowing us to participate in the care of Ms. N[facility]ñez [facility].

## 2022-01-15 NOTE — H&P
Hospitalist History & Physical      PCP: Godwin Espinoza MD    Date of Service: Pt seen/examined on 1/15/2022     Chief Complaint:  had concerns including Emesis. History Of Present Illness:    Ms. Zachariah Patel, a 36y.o. year old female  who  has a past medical history of Acute congestive heart failure ([facility] [address] BC (acute kidney injury) ([address] Cardiac arrest ([address] Cephalgia, Chronic kidney disease, Depression, Diabetes mellitus ([address] Diabetic gastroparesis associated with type 1 diabetes mellitus ([address] Diabetic ketoacidosis ([address] Diabetic ketoacidosis with coma associated with type 1 diabetes mellitus ([address] Diabetic polyneuropathy associated with type 1 diabetes mellitus ([address] Drug use complicating pregnancy in third trimester, Endocarditis, ESRD (end stage renal disease) ([address] H/O cardiovascular stress test, Hemodialysis patient ([address] History of blood transfusion, Hyperlipidemia, Hyperosmolar hyperglycemic state (HHS) ([address] Hypothyroidism, Iron deficiency anemia, MDRO (multiple drug resistant organisms) resistance, MRSA (methicillin resistant Staphylococcus aureus), Non compliance w medication regimen, Other disorders of kidney and ureter, Pregnancy, Previous  delivery affecting pregnancy, antepartum, Previous stillbirth or demise, antepartum, Seizure ([address] Severe pre-eclampsia in third trimester, Shock liver, and Valvular endocarditis. Patient presented to the emergency with complaints of nausea and vomiting. She has vomited 7 times today. Not able to keep anything down. Blood sugars are running high. Has a history of recent COVID diagnosis. Vital signs reveal the patient be afebrile. Tachycardic with a rate of 117. Currently heart rate 97. Blood pressure within normal meds and stable. Not hypoxic with an SPO2 100% on room air.   Laboratory studies reveal potassium 3.2, creatinine 4.0, anion gap 18, glucose 327, calcium 7.4, troponin 162, alk phos 204, beta hydroxybutyrate >4.50, hemoglobin 9.6. COVID-19 negative. pH 7.31. Patient was started on IV fluids and insulin infusion. Patient will be admitted for further treatment of DKA. Medicine consulted for admission.       Past Medical History:   Diagnosis Date    Acute congestive heart failure ([address]     BC (acute kidney injury) ([address] 10/01/2019    Cardiac arrest ([address] 02/15/2021    Cephalgia 10/09/2019    Chronic kidney disease     Depression     Diabetes mellitus ([address]     Diabetic gastroparesis associated with type 1 diabetes mellitus ([address] 2018    Diabetic ketoacidosis ([address] 2011    Diabetic ketoacidosis with coma associated with type 1 diabetes mellitus ([address] 2013    Diabetic polyneuropathy associated with type 1 diabetes mellitus ([address] 2020    Drug use complicating pregnancy in third trimester     Endocarditis 10/31/2020    ESRD (end stage renal disease) ([address] 2020    H/O cardiovascular stress test 2021    Lexiscan    Hemodialysis patient [facility])     History of blood transfusion 2019    Hyperlipidemia 10/08/2020    Hyperosmolar hyperglycemic state (HHS) ([address] 2020    Hypothyroidism 10/08/2020    Iron deficiency anemia 10/01/2019    MDRO (multiple drug resistant organisms) resistance     MRSA (methicillin resistant Staphylococcus aureus)     back wound abcess    Non compliance w medication regimen 2016    Other disorders of kidney and ureter     Pregnancy 2016    16 weeks    Previous  delivery affecting pregnancy, antepartum 2017    Previous stillbirth or demise, antepartum 2016    Seizure ([address] 2020    Severe pre-eclampsia in third trimester 2016    Shock liver 02/15/2021    Valvular endocarditis 11/10/2020    This Diagnosis was added to the Problem List based on transcribed orders from Dr. Collette Every          Past Surgical History:   Procedure Laterality Date    BACK SURGERY      abscess    CATHETER REMOVAL N/A 10/1/2021    PERITONEAL CATHETER REMOVAL performed by Dario Moreno MD at [facility] 49      x2   [address], LAPAROSCOPIC N/A 11/7/2019    CHOLECYSTECTOMY LAPAROSCOPIC performed by Prieto Siddiqui MD at [facility] N/A 6/25/2018    COLONOSCOPY WITH BIOPSY performed by Michelle Kiran MD at [address] [facility] COLONOSCOPY N/A 12/18/2018    COLONOSCOPY WITH BIOPSY performed by Sara Santos MD at [address]  1/31/2012    EF 57%    ECHO COMPL W DOP COLOR FLOW  6/10/2013         EMBOLECTOMY N/A 11/6/2020    [address] [place] [address], [id number] [place] [address], YULISSA -- REQS ROOM 3 performed by Braxton Goodell, MD at [place] [address] Left 2/23/2021    LEFT LEG INCISION AND DRAINAGE, DEBRIDEMENT, WOUND VAC APPLICATION performed by Cleopatra Llanes DPM at [address] 5/18/2021    LEFT LEG DEBRIDEMENT BIOPSY POSS APPLICATION WOUND VAC performed by Cleopatra Llanes DPM at [place] [address] N/A 6/26/2020    LAPAROSCOPIC INSERTION PERITONEAL DIALYSIS CATHETER performed by Libby Abarca MD at [facility] [address] ESOPHAGOGASTRODUODENOSCOPY TRANSORAL DIAGNOSTIC N/A 5/30/2018    EGD ESOPHAGOGASTRODUODENOSCOPY performed by Michelle Kiran MD at [address] [place] [address] TRANSESOPHAGEAL ECHOCARDIOGRAM N/A 10/19/2020    TRANSESOPHAGEAL ECHOCARDIOGRAM WITH BUBBLE STUDY performed by Les Andres MD at [address] [facility]  04/2018    UPPER GASTROINTESTINAL ENDOSCOPY  12/18/2018    EGD BIOPSY performed by Sara Santos MD at [address] [place] [address] 10/11/2019    EGD ESOPHAGOGASTRODUODENOSCOPY performed by Katerin Mancuso DO at [address] [place] [address] N/A 7/14/2021    EGD BIOPSY performed by Sarath Burgos MD at [place] [address]       Prior to Admission medications    Medication Sig Start Date End Date Taking?  Authorizing Provider albuterol sulfate  (90 Base) MCG/ACT inhaler Inhale 4 puffs into the lungs as needed for Wheezing or Shortness of Breath 1/5/22   Evin Lopez DO   sevelamer (RENVELA) 800 MG tablet TAKE 2 TABLETS BY MOUTH 3 TIMES A DAY WITH MEALS AND TAKE 1 TABLET EVERY DAY AT BEDTIME    Historical Provider, MD   insulin lispro, 1 Unit Dial, 100 UNIT/ML SOPN INJECT 3 UNITS WITH MEALS   SLIDING SCALE. MAX 30U/DAY    Historical Provider, MD   pantoprazole (PROTONIX) 40 MG tablet Take 1 tablet by mouth 2 times daily 12/17/21   Lupie Holter, MD   docusate sodium (COLACE) 100 MG capsule Take 1 capsule by mouth daily 12/13/21   Mary Dial MD   sennosides-docusate sodium (SENOKOT-S) 8.6-50 MG tablet Take 2 tablets by mouth nightly as needed for Constipation  Patient not taking: Reported on 12/17/2021 11/25/21   Yeni Nicolas PA-C   insulin glargine (LANTUS) 100 UNIT/ML injection vial Inject 6 Units into the skin 2 times daily 11/15/21   Gonzales Esparza MD   vitamin D (ERGOCALCIFEROL) 1.25 MG (63564 UT) CAPS capsule Take 1 capsule by mouth once a week 9/3/21   Gonzales Esparza MD   mirtazapine (REMERON) 15 MG tablet Take 15 mg by mouth nightly    Historical Provider, MD   polyethylene glycol (GLYCOLAX) 17 g packet Take 17 g by mouth daily as needed for Constipation  Patient not taking: Reported on 12/17/2021    Historical Provider, MD   ARIPiprazole (ABILIFY) 5 MG tablet Take 5 mg by mouth nightly 4/18/21   Historical Provider, MD   levothyroxine (SYNTHROID) 75 MCG tablet TAKE 1 TABLET BY MOUTH IN THE MORNING BEFORE BREAKFAST 4/19/21   Jacinda Littlejohn DO   rOPINIRole (REQUIP) 0.25 MG tablet Take 0.25 mg by mouth nightly    Historical Provider, MD         Allergies:  Cefepime and Toradol [ketorolac tromethamine]    Social History:    TOBACCO:   reports that she quit smoking about a year ago. Her smoking use included cigarettes. She has a 3.00 pack-year smoking history.  She has never used smokeless tobacco.  ETOH:   reports no history of alcohol use. Family History:    Reviewed in detail and negative for DM, CAD, Cancer, CVA. Positive as follows\"      Problem Relation Age of Onset    Asthma Mother     Hypertension Mother     High Blood Pressure Mother     Diabetes Mother     Asthma Brother     High Blood Pressure Father        REVIEW OF SYSTEMS:   Pertinent positives as noted in the HPI. All other systems reviewed and negative. PHYSICAL EXAM:  /79   Pulse 107   Temp 98 °F (36.7 °C) (Oral)   Resp 15   Ht 5' 4\" (1.626 m)   Wt 140 lb (63.5 kg)   SpO2 100%   BMI 24.03 kg/m²   General appearance: No apparent distress, appears stated age and cooperative. HEENT: Normal cephalic, atraumatic without obvious deformity. Pupils equal, round, and reactive to light. Extra ocular muscles intact. Conjunctivae/corneas clear. Neck: Supple, with full range of motion. No jugular venous distention. Trachea midline. Respiratory: Clear to auscultation bilaterally  Cardiovascular: Regular rate and rhythm  Abdomen: Soft, nontender, nondistended  Musculoskeletal: No clubbing, cyanosis, edema of bilateral lower extremities. Brisk capillary refill. Skin: Normal skin color. No rashes or lesions. Neurologic:  Neurovascularly intact without any focal sensory/motor deficits. Cranial nerves: II-XII intact, grossly non-focal.  Psychiatric: Alert and oriented, thought content appropriate, normal insight    Reviewed EKG and CXR personally      CBC:   Recent Labs     01/14/22 2105   WBC 7.7   RBC 3.70   HGB 9.6*   HCT 29.8*   MCV 80.5   RDW 19.6*        BMP:   Recent Labs     01/14/22 2105 01/14/22  2307    135   K 4.2 3.2*   CL 90* 97*   CO2 23 20*   BUN 13 13   CREATININE 4.5* 4.0*   MG 1.9  --      LFT:  Recent Labs     01/14/22 2105   PROT 6.9   ALKPHOS 204*   ALT 14   AST 22   BILITOT 0.4     CE:  No results for input(s): Pravin Gómez in the last 72 hours.   PT/INR: No results for input(s): INR, APTT in the last 72 hours. BNP: No results for input(s): BNP in the last 72 hours. ESR:   Lab Results   Component Value Date    SEDRATE 31 (H) 08/16/2021     CRP:   Lab Results   Component Value Date    CRP 0.9 (H) 08/16/2021     D Dimer:   Lab Results   Component Value Date    DDIMER 281 01/12/2020      Folate and B12:   Lab Results   Component Value Date    NATZVZCR25 1908 (H) 11/24/2021   ,   Lab Results   Component Value Date    FOLATE 9.2 11/24/2021     Lactic Acid:   Lab Results   Component Value Date    LACTA 2.3 (H) 01/14/2022     Thyroid Studies:   Lab Results   Component Value Date    TSH 5.000 (H) 11/24/2021    C7UMBMX 36.73 (L) 07/20/2020    M0GOFKO 8.6 11/05/2015       Oupatient labs:  Lab Results   Component Value Date    CHOL 95 01/14/2020    TRIG 82 01/14/2020    HDL 33 01/14/2020    LDLCALC 46 01/14/2020    TSH 5.000 (H) 11/24/2021    INR 1.1 12/08/2021    LABA1C 8.7 (H) 12/08/2021       Urinalysis:    Lab Results   Component Value Date    NITRU Negative 11/22/2021    WBCUA 2-5 11/22/2021    WBCUA 0-1 05/18/2012    BACTERIA RARE 11/22/2021    RBCUA 0-1 11/22/2021    RBCUA 1-3 08/01/2013    BLOODU TRACE 11/22/2021    SPECGRAV 1.020 11/22/2021    GLUCOSEU >=1000 11/22/2021    GLUCOSEU >=1000 05/18/2012       Imaging:  XR CHEST PORTABLE    Result Date: 1/14/2022  EXAMINATION: ONE XRAY VIEW OF THE CHEST 1/14/2022 9:43 pm COMPARISON: December 31, 2021 HISTORY: ORDERING SYSTEM PROVIDED HISTORY: weakness, Vomiting TECHNOLOGIST PROVIDED HISTORY: Reason for exam:->weakness, Vomiting What reading provider will be dictating this exam?->CRC FINDINGS: Mild opacity at the lung bases. The heart is prominent in size. Dialysis catheter present. No pneumothorax. Costophrenic angles are clear. Suspect early bibasilar infiltrates. XR CHEST PORTABLE    Result Date: 12/31/2021  EXAMINATION: ONE XRAY VIEW OF THE CHEST 12/31/2021 10:02 pm COMPARISON: None.  HISTORY: ORDERING SYSTEM PROVIDED HISTORY: cough, shortness of breath TECHNOLOGIST PROVIDED HISTORY: Reason for exam:->cough, shortness of breath FINDINGS: The lungs are without acute focal process. There is no effusion or pneumothorax. The cardiomediastinal silhouette is without acute process. The osseous structures are without acute process. No acute process. Dialysis catheter in place. ASSESSMENT:  -Diabetic ketoacidosis  -Hypokalemia  -Elevated troponin in the setting of ESRD  -End-stage renal disease on hemodialysis  -Chronic anemia  -Type 1 diabetes      PLAN:  -Admit to medicine  -Consult nephrology  -IV fluids per DKA protocol  -Insulin infusion per DKA protocol  -Monitor serum electrolytes  -Repeat troponin  -Telemetry  -Continue home medications        Diet: Diet NPO  Code Status: Prior  Surrogate decision maker confirmed with patient:   Extended Emergency Contact Information  Primary Emergency Contact: [address] [place] [address] Phone: [phone]  Mobile Phone: [phone]  Relation: Parent   needed? No    DVT Prophylaxis: []Lovenox []Heparin []PCD [] [address] []Encouraged ambulation  Disposition: []Med/Surg [] Intermediate [] ICU/CCU  Admit status: [] Observation [] Inpatient     +++++++++++++++++++++++++++++++++++++++++++++++++  Edin Ackerman DO  [address]  +++++++++++++++++++++++++++++++++++++++++++++++++  NOTE: This report was transcribed using voice recognition software. Every effort was made to ensure accuracy; however, inadvertent computerized transcription errors may be present.

## 2022-01-15 NOTE — PROGRESS NOTES
Chart reviewed for potential DKA admission. Patient has AG that is closed, is no longer on insulin drip and is starting SQ insulin with AC/HS blood sugar checks. This patient does not need intermediate care for DKA purposes and can be admitted to any intermediate floor should she still require intermediate level of care.

## 2022-01-15 NOTE — ED NOTES
Pt's foster mother called and is aware of room assignment and will call back to floor after a little while.      Yuliana Davis RN  01/15/22 [id number]

## 2022-01-15 NOTE — CONSULTS
ENDOCRINOLOGY INITIAL CONSULTATION NOTE      Date of admission: 1/14/2022  Date of service: 1/15/2022  Admitting physician: No admitting provider for patient encounter. Primary Care Physician: Charlene Simpson MD  Consultant physician: Dottie Saha MD     Reason for the consultation:  Uncontrolled DM,labile BG    History of Present Illness: The history is provided by the patient. Accuracy of the patient data is excellent    [address] Mal[address] is a very pleasant 36 y.o. old female with PMH of Type I DM, ESRD on PD,HTN,hypothyroidism, infective endocarditis and other listed below admitted to [facility] on 1/14/2022 because of N/V, abdominal pain and found to be in DKA. Endocrine service was consulted for DM management. The patient was in her usual state of health until the day before admission when started c/o abdominal pain and bloating associated with N/V. She denies fever, chills, cough or SOB  The patient was recently diagnosed COVID on 12/31/2021   Admission labs , AG 19, Bicarb 23, Cr 4.5, K 4.2, BHB >4.5      Prior to admission  The patient was diagnosed with type 1DM at the age 17. Prior to admission patient was on lantus 10 units daily, Humalog per slisding scale. She also used to be on insuin pump in the past. She stopped using insulin pump almost a year ago after lost her CGM. Patient has not been eating consistent carbohydrate meals, self-blood glucose monitoring has been above goal prior to admission.  Her diabetes complicated with + Retinopathy, + ESRD on dialysis, + Neuropathy   Lab Results   Component Value Date    LABA1C 8.7 12/08/2021     Inpatient diet:   Renal/Carb Restricted diet     Point of care glucose monitoring (Independently reviewed)   Recent Labs     01/14/22  2102 01/14/22  2316 01/15/22  0229 01/15/22  0321   GLUMET 352* 334* 146* 123*       Past medical history:   Past Medical History:   Diagnosis Date    Acute congestive heart failure ([address]     BC (acute kidney injury) ([address] 10/01/2019    Cardiac arrest ([address] 02/15/2021    Cephalgia 10/09/2019    Chronic kidney disease     Depression     Diabetes mellitus ([address]     Diabetic gastroparesis associated with type 1 diabetes mellitus ([address] 2018    Diabetic ketoacidosis ([address] 2011    Diabetic ketoacidosis with coma associated with type 1 diabetes mellitus ([address] 2013    Diabetic polyneuropathy associated with type 1 diabetes mellitus ([address] 2020    Drug use complicating pregnancy in third trimester     Endocarditis 10/31/2020    ESRD (end stage renal disease) ([address] 2020    H/O cardiovascular stress test 2021    Lexiscan    Hemod[facility] patient [facility])     History of blood transfusion 2019    Hyperlipidemia 10/08/2020    Hyperosmolar hyperglycemic state (HHS) ([address] 2020    Hypothyroidism 10/08/2020    Iron deficiency anemia 10/01/2019    MDRO (multiple drug resistant organisms) resistance     MRSA (methicillin resistant Staphylococcus aureus)     back wound abcess    Non compliance w medication regimen 2016    Other disorders of kidney and ureter     Pregnancy 2016    16 weeks    Previous  delivery affecting pregnancy, antepartum 2017    Previous stillbirth or demise, antepartum 2016    Seizure ([address] 2020    Severe pre-eclampsia in third trimester 2016    Shock liver 02/15/2021    Valvular endocarditis 11/10/2020    This Diagnosis was added to the Problem List based on transcribed orders from Dr. Shahnaz Fulton          Past surgical history:  Past Surgical History:   Procedure Laterality Date    BACK SURGERY      abscess    CATHETER REMOVAL N/A 10/1/2021    PERITONEAL CATHETER REMOVAL performed by Jackson Modi MD at [facility] 49      x2   [facility], LAPAROSCOPIC N/A 2019    CHOLECYSTECTOMY LAPAROSCOPIC performed by Gilberto Canales MD at [address] [place] [place] [address] N/A 2018    COLONOSCOPY WITH BIOPSY performed by Evelyn Khan MD at [id number] [place] [address] COLONOSCOPY N/A 12/18/2018    COLONOSCOPY WITH BIOPSY performed by Ekta Landry MD at [address]  1/31/2012    EF 57%    ECHO COMPL W DOP COLOR FLOW  6/10/2013         EMBOLECTOMY N/A 11/6/2020    [address] [facility], [address] [facility], [place]ULISSA -- REQS ROOM 3 performed by Mercedes Owens MD at [address] [place] [address] Left 2/23/2021    LEFT LEG INCISION AND DRAINAGE, DEBRIDEMENT, WOUND VAC APPLICATION performed by Carmen Calix DPM at [address] [place] [address] Left 5/18/2021    LEFT LEG DEBRIDEMENT BIOPSY POSS APPLICATION WOUND VAC performed by Carmen Calix DPM at [address] [place] [address] N/A 6/26/2020    LAPAROSCOPIC INSERTION PERITONEAL DIALYSIS CATHETER performed by Annamaria Arevalo MD at [place] [address] ESOPHAGOGASTRODUODENOSCOPY TRANSORAL DIAGNOSTIC N/A 5/30/2018    EGD ESOPHAGOGASTRODUODENOSCOPY performed by Evelyn Khan MD at [address] [place] [address] TRANSESOPHAGEAL ECHOCARDIOGRAM N/A 10/19/2020    TRANSESOPHAGEAL ECHOCARDIOGRAM WITH BUBBLE STUDY performed by Rosa Carr MD at [address] [place] [address] TUNNELED VENOUS PORT PLACEMENT  04/2018    UPPER GASTROINTESTINAL ENDOSCOPY  12/18/2018    EGD BIOPSY performed by Ekta Landry MD at [facility] 10/11/2019    EGD ESOPHAGOGASTRODUODENOSCOPY performed by Sena Jarvis DO at [address] [place] [address] [address] N/A 7/14/2021    EGD BIOPSY performed by Shannan Cuevas MD at [facility] history:   Tobacco:   reports that she quit smoking about a year ago. Her smoking use included cigarettes. She has a 3.00 pack-year smoking history. She has never used smokeless tobacco.  Alcohol:   reports no history of alcohol use. Drugs:   reports current drug use. Drug: Opiates .     Family history:    Family History   Problem Relation Age of Onset    Asthma Mother     Hypertension Mother    Diego Carbon High Blood Pressure Mother     Diabetes Mother     Asthma Brother     High Blood Pressure Father        Allergy and drug reactions: Allergies   Allergen Reactions    Cefepime Other (See Comments)     \" neurological side effect- slurred speech and confusion    Toradol [Ketorolac Tromethamine] Anaphylaxis and Hives       Scheduled Meds:   insulin regular        ARIPiprazole  5 mg Oral Nightly    levothyroxine  75 mcg Oral Daily    mirtazapine  15 mg Oral Nightly    pantoprazole  40 mg Oral BID AC    rOPINIRole  0.25 mg Oral Nightly    heparin (porcine)  5,000 Units SubCUTAneous Q8H    insulin lispro  0-6 Units SubCUTAneous TID WC    insulin lispro  0-3 Units SubCUTAneous Nightly    insulin glargine  7 Units SubCUTAneous QAM     PRN Meds:   dextrose, 12.5 g, PRN  potassium chloride, 10 mEq, PRN  magnesium sulfate, 1,000 mg, PRN  sodium phosphate IVPB, 10 mmol, PRN   Or  sodium phosphate IVPB, 15 mmol, PRN   Or  sodium phosphate IVPB, 20 mmol, PRN  polyethylene glycol, 17 g, Daily PRN  dextrose 5 % and 0.45 % NaCl, , Continuous PRN  fentanNYL, 25 mcg, Q1H PRN      Continuous Infusions:   sodium chloride Stopped (01/15/22 0230)    dextrose 5 % and 0.45 % NaCl Stopped (01/15/22 0323)    insulin Stopped (01/15/22 0323)       Review of Systems  All systems reviewed. All negative except for symptoms mentioned in HPI     OBJECTIVE    BP (!) 116/94   Pulse 89   Temp 99 °F (37.2 °C) (Oral)   Resp 18   Ht 5' 4\" (1.626 m)   Wt 140 lb (63.5 kg)   SpO2 95%   BMI 24.03 kg/m²   No intake or output data in the 24 hours ending 01/15/22 0956    Physical examination:  General: awake alert, oriented x3, no abnormal position or movements. HEENT: normocephalic non-traumatic, no exophthalmos   Neck: supple, no LN enlargement, no thyromegaly, no thyroid tenderness, no JVD.   Pulm: Clear equal air entry no added sounds, no wheezing or rhonchi    CVS: S1 + S2, no murmur, no heave  Abd: soft lax, no tenderness, no Value Date    TRIG 82 01/14/2020    HDL 33 01/14/2020    LDLCALC 46 01/14/2020    CHOL 95 01/14/2020       Blood culture   Lab Results   Component Value Date    BC 5 Days no growth 01/01/2022    BC 5 Days no growth 11/22/2021       Radiology:  XR CHEST PORTABLE   Final Result   Suspect early bibasilar infiltrates. Medical Records/Labs/Images review:   I personally reviewed and summarized previous records   All labs and imaging were reviewed independently     Mary Maldonado, a 36 y.o.-old female seen today for inpatient diabetes management     Diabetes Mellitus type I    · Patient's diabetes is uncontrolled, labile blood glucose. · With ESRD she is at a high risk for hypoglycemia   · Insulin drip was turned off last night and didn't receive long acting insulin   · We recommend the following diabetes regimen   · Lantus 7 units daily   · Low dose sliding scale with meals and at night   · Continue glucose check with meals and at bedtime   · Will titrate insulin dose based on the blood glucose trend & insulin requirement  · Will arrange a meeting with our pump  after discharge to restart insulin pump and CMG   · We will arrange for the patient to follow with us after discharge    primary Hypothyroidism  · On levothyroxine 75 mcg daily. She is missing doses   · To recheck TFT 6-8 weeks after discharge     N/V/   · symptomatic Management per primary  service     ESRD  · Pt at risk for hypoglycemia  · On dialysis, nephrology following    The above issues were reviewed with the patient who understood and agreed with the plan. Thank you for allowing us to participate in the care of this patient. Please do not hesitate to contact us with any additional questions.      Luma Norwood MD  Endocrinologist, [facility] Diabetes Care and Endocrinology   [address] [facility] [address]   Phone: [phone]  Fax: [phone]  ----------------------------  An electronic signature was used to authenticate this note.  Mitzy Bocanegra MD on 1/15/2022 at 9:56 AM

## 2022-01-15 NOTE — ED PROVIDER NOTES
[facility] [address]  Department of Emergency Medicine     Written by: Raquel Denson DO  Patient Name: Rachel Walker  Attending Provider: No att. providers found  Admit Date: 2022  8:49 PM  MRN: [id number]                   : 1990        Chief Complaint   Patient presents with    Emesis    - Chief complaint    Patient is a 68-year-old female past medical history of diabetes, CKD on HD, hypothyroidism and hyperlipidemia. Patient presents with chief complaint of nausea, vomiting elevated blood sugars. Patient states that she has a history of type 1 diabetes and has not been compliant with insulin therapy lately. Patient also notes that for the last week she has had multiple episodes of nonbilious nonbloody vomiting associated with nausea. Patient states that symptoms have been severe in severity and constant since onset. She denies any exacerbating or relieving factors. Patient notes that she has had multiple similar episodes in the past when she was found to be in DKA. Patient denies any fevers, chills, chest pain, cough, abdominal pain, constipation, numbness or tingling    The history is provided by the patient. No  was used. Review of Systems   Constitutional: Positive for fatigue. Negative for chills and fever. HENT: Negative for ear pain, sinus pressure and sore throat. Eyes: Negative for pain, discharge and redness. Respiratory: Negative for cough, shortness of breath and wheezing. Cardiovascular: Negative for chest pain. Gastrointestinal: Positive for nausea and vomiting. Negative for abdominal distention and diarrhea. Genitourinary: Negative for dysuria and frequency. Musculoskeletal: Negative for arthralgias and back pain. Skin: Negative for rash and wound. Neurological: Negative for weakness and headaches. Hematological: Negative for adenopathy. All other systems reviewed and are negative.        Physical Exam  Vitals and nursing note reviewed. Constitutional:       General: She is not in acute distress. Appearance: Normal appearance. HENT:      Head: Normocephalic and atraumatic. Nose: No congestion or rhinorrhea. Mouth/Throat:      Mouth: Mucous membranes are moist.      Pharynx: Oropharynx is clear. Eyes:      Extraocular Movements: Extraocular movements intact. Pupils: Pupils are equal, round, and reactive to light. Cardiovascular:      Rate and Rhythm: Regular rhythm. Tachycardia present. Heart sounds: No murmur heard. No gallop. Pulmonary:      Effort: Pulmonary effort is normal. No respiratory distress. Breath sounds: No wheezing, rhonchi or rales. Chest:      Comments: HD line clean dry and intact right chest  Abdominal:      General: Abdomen is flat. Palpations: Abdomen is soft. There is no mass. Tenderness: There is no abdominal tenderness. There is no guarding. Hernia: No hernia is present. Musculoskeletal:         General: No swelling, tenderness or signs of injury. Normal range of motion. Cervical back: Normal range of motion. No rigidity. No muscular tenderness. Skin:     General: Skin is warm and dry. Capillary Refill: Capillary refill takes less than 2 seconds. Neurological:      General: No focal deficit present. Mental Status: She is alert and oriented to person, place, and time. Mental status is at baseline. Psychiatric:         Mood and Affect: Mood normal.         Behavior: Behavior normal.          Procedures       MDM  Number of Diagnoses or Management Options  Nausea and vomiting, intractability of vomiting not specified, unspecified vomiting type  Type 1 diabetes mellitus with ketoacidosis without coma ([facility] [address]  Diagnosis management comments: Patient is a 26-year-old female past medical history of diabetes, CKD on on HD, hypothyroidism and hyperlipidemia. Patient with chief complaint of nausea and elevated blood sugars.  Vital signs stable presentations of mild tachycardia. On physical exam heart mildly tachycardic regular rhythm, lungs clear to auscultation bilaterally, abdomen soft nontender no rigidity rebound or guarding. EKG obtained demonstrated no acute ischemic changes. CBC obtained demonstrated mild anemia hemoglobin 9.6, CMP obtained demonstrated elevated anion gap 19, glucose 400, creatinine 4.5, beta hydroxy greater than 4.5, venous pH 7.29, troponin was obtained is 162. Chest x-ray obtained demonstrate no acute abnormalities. Patient given IV fluids as well as IV insulin on recheck patient continues to have an elevated anion gap and elevated blood sugars. Patient started on insulin infusion. Findings consistent with DKA likely second to poorly controlled type 1 diabetes and noncompliance with insulin. Decision made to admit patient. Case discussed with hospitalist who agreed to meet patient.  Plan of care discussed with patient including admission, all questions were answered, patient was in agreement plan of care and admitted to the hospital in stable condition       Amount and/or Complexity of Data Reviewed  Clinical lab tests: ordered and reviewed  Tests in the radiology section of CPT®: ordered and reviewed  Decide to obtain previous medical records or to obtain history from someone other than the patient: yes    Risk of Complications, Morbidity, and/or Mortality  Presenting problems: moderate  Diagnostic procedures: moderate  Management options: moderate    Patient Progress  Patient progress: stable           --------------------------------------------- PAST HISTORY ---------------------------------------------  Past Medical History:  has a past medical history of Acute congestive heart failure ([facility] [address] BC (acute kidney injury) ([facility] [address] Cardiac arrest ([facility] [address] Cephalgia, Chronic kidney disease, Depression, Diabetes mellitus ([facility] [address] Diabetic gastroparesis associated with type 1 diabetes mellitus ([facility] [address] Diabetic ketoacidosis ([facility] [address] Diabetic ketoacidosis with coma associated with type 1 diabetes mellitus ([facility] [address] Diabetic polyneuropathy associated with type 1 diabetes mellitus ([facility] [address] Drug use complicating pregnancy in third trimester, Endocarditis, ESRD (end stage renal disease) ([facility] [address] H/O cardiovascular stress test, Hemodialysis patient ([facility] [address] History of blood transfusion, Hyperlipidemia, Hyperosmolar hyperglycemic state (HHS) ([facility] [address] Hypothyroidism, Iron deficiency anemia, MDRO (multiple drug resistant organisms) resistance, MRSA (methicillin resistant Staphylococcus aureus), Non compliance w medication regimen, Other disorders of kidney and ureter, Pregnancy, Previous  delivery affecting pregnancy, antepartum, Previous stillbirth or demise, antepartum, Seizure ([facility] [address] Severe pre-eclampsia in third trimester, Shock liver, and Valvular endocarditis. Past Surgical History:  has a past surgical history that includes ECHO Compl W Dop Color Flow (2012); ECHO Compl W Dop Color Flow (6/10/2013);  section; Tunneled venous port placement (2018); pr esophagogastroduodenoscopy transoral diagnostic (N/A, 2018); back surgery; Colonoscopy (N/A, 2018); Colonoscopy (N/A, 2018); Upper gastrointestinal endoscopy (2018); Upper gastrointestinal endoscopy (N/A, 10/11/2019); Cholecystectomy, laparoscopic (N/A, 2019); LAPAROSCOPY INSERTION PERITONEAL CATHETER (N/A, 2020); transesophageal echocardiogram (N/A, 10/19/2020); embolectomy (N/A, 2020); Foot Debridement (Left, 2021); Foot Debridement (Left, 2021); Upper gastrointestinal endoscopy (N/A, 2021); and Catheter Removal (N/A, 10/1/2021). Social History:  reports that she quit smoking about a year ago. Her smoking use included cigarettes. She has a 3.00 pack-year smoking history. She has never used smokeless tobacco. She reports current drug use. Drug: Opiates . She reports that she does not drink alcohol.     Family History: family history includes Asthma in her brother and mother; Diabetes in her mother; High Blood Pressure in her father and mother; Hypertension in her mother. The patients home medications have been reviewed.     Allergies: Cefepime and Toradol [ketorolac tromethamine]    -------------------------------------------------- RESULTS -------------------------------------------------    LABS:  Results for orders placed or performed during the hospital encounter of 01/14/22   COVID-19, Rapid    Specimen: Nasopharyngeal Swab   Result Value Ref Range    SARS-CoV-2, NAAT Not Detected Not Detected   CBC Auto Differential   Result Value Ref Range    WBC 7.7 4.5 - 11.5 E9/L    RBC 3.70 3.50 - 5.50 E12/L    Hemoglobin 9.6 (L) 11.5 - 15.5 g/dL    Hematocrit 29.8 (L) 34.0 - 48.0 %    MCV 80.5 80.0 - 99.9 fL    MCH 25.9 (L) 26.0 - 35.0 pg    MCHC 32.2 32.0 - 34.5 %    RDW 19.6 (H) 11.5 - 15.0 fL    Platelets 445 589 - 166 E9/L    MPV 11.0 7.0 - 12.0 fL    Neutrophils % 74.5 43.0 - 80.0 %    Immature Granulocytes % 0.4 0.0 - 5.0 %    Lymphocytes % 20.7 20.0 - 42.0 %    Monocytes % 3.3 2.0 - 12.0 %    Eosinophils % 0.7 0.0 - 6.0 %    Basophils % 0.4 0.0 - 2.0 %    Neutrophils Absolute 5.72 1.80 - 7.30 E9/L    Immature Granulocytes # 0.03 E9/L    Lymphocytes Absolute 1.59 1.50 - 4.00 E9/L    Monocytes Absolute 0.25 0.10 - 0.95 E9/L    Eosinophils Absolute 0.05 0.05 - 0.50 E9/L    Basophils Absolute 0.03 0.00 - 0.20 E9/L   Comprehensive Metabolic Panel   Result Value Ref Range    Sodium 132 132 - 146 mmol/L    Potassium 4.2 3.5 - 5.0 mmol/L    Chloride 90 (L) 98 - 107 mmol/L    CO2 23 22 - 29 mmol/L    Anion Gap 19 (H) 7 - 16 mmol/L    Glucose 400 (H) 74 - 99 mg/dL    BUN 13 6 - 20 mg/dL    CREATININE 4.5 (H) 0.5 - 1.0 mg/dL    GFR Non-African American 14 >=60 mL/min/1.73    GFR African American 14     Calcium 8.8 8.6 - 10.2 mg/dL    Total Protein 6.9 6.4 - 8.3 g/dL    Albumin 3.4 (L) 3.5 - 5.2 g/dL    Total Bilirubin 0.4 0.0 - 1.2 mg/dL Alkaline Phosphatase 204 (H) 35 - 104 U/L    ALT 14 0 - 32 U/L    AST 22 0 - 31 U/L   pH, venous   Result Value Ref Range    pH, Khurram 7.29 (L) 7.35 - 7.45   Magnesium   Result Value Ref Range    Magnesium 1.9 1.6 - 2.6 mg/dL   Lactic Acid, Plasma   Result Value Ref Range    Lactic Acid 2.3 (H) 0.5 - 2.2 mmol/L   Beta-Hydroxybutyrate   Result Value Ref Range    Beta-Hydroxybutyrate >4.50 (H) 0.02 - 0.27 mmol/L   SPECIMEN REJECTION   Result Value Ref Range    Rejected Test TROP     Reason for Rejection see below    Basic metabolic panel   Result Value Ref Range    Sodium 135 132 - 146 mmol/L    Potassium 3.2 (L) 3.5 - 5.0 mmol/L    Chloride 97 (L) 98 - 107 mmol/L    CO2 20 (L) 22 - 29 mmol/L    Anion Gap 18 (H) 7 - 16 mmol/L    Glucose 327 (H) 74 - 99 mg/dL    BUN 13 6 - 20 mg/dL    CREATININE 4.0 (H) 0.5 - 1.0 mg/dL    GFR Non-African American 16 >=60 mL/min/1.73    GFR African American 16     Calcium 7.4 (L) 8.6 - 10.2 mg/dL   pH, venous   Result Value Ref Range    pH, Khurram 7.31 (L) 7.35 - 7.45   Troponin   Result Value Ref Range    Troponin, High Sensitivity 162 (H) 0 - 9 ng/L   POCT Glucose   Result Value Ref Range    Meter Glucose 352 (H) 74 - 99 mg/dL   POCT Glucose   Result Value Ref Range    Glucose 334 mg/dL   POCT Glucose   Result Value Ref Range    Meter Glucose 334 (H) 74 - 99 mg/dL   EKG 12 Lead   Result Value Ref Range    Ventricular Rate 109 BPM    Atrial Rate 109 BPM    P-R Interval 126 ms    QRS Duration 84 ms    Q-T Interval 344 ms    QTc Calculation (Bazett) 463 ms    P Axis 37 degrees    R Axis 14 degrees    T Axis -17 degrees       RADIOLOGY:  XR CHEST PORTABLE   Final Result   Suspect early bibasilar infiltrates. EKG:  This EKG is signed and interpreted by me. Rate: 109  Rhythm: Sinus  Interpretation: EKG obtained demonstrates sinus tachycardia, rate 109, normal axis, , no acute ST segment changes.   Comparison: stable as compared to patient's most recent EKG      ------------------------- NURSING NOTES AND VITALS REVIEWED ---------------------------  Date / Time Roomed:  1/14/2022  8:49 PM  ED Bed Assignment:  13/13    The nursing notes within the ED encounter and vital signs as below have been reviewed. Patient Vitals for the past 24 hrs:   BP Temp Temp src Pulse Resp SpO2 Height Weight   01/15/22 0100 115/80 -- -- 97 16 100 % -- --   01/14/22 2300 122/79 -- -- 107 15 100 % -- --   01/14/22 2200 (!) 120/56 -- -- 110 16 100 % -- --   01/14/22 2120 (!) 139/90 -- -- 110 15 99 % -- --   01/14/22 2100 101/76 -- -- 108 12 99 % -- --   01/14/22 2048 (!) 71/49 98 °F (36.7 °C) Oral 115 17 98 % 5' 4\" (1.626 m) 140 lb (63.5 kg)   01/14/22 2044 -- 97.9 °F (36.6 °C) Oral 117 16 97 % -- --       Oxygen Saturation Interpretation: Normal    ------------------------------------------ PROGRESS NOTES ------------------------------------------  Re-evaluation(s):  Time: 0045  Patients symptoms are improving  Repeat physical examination is improved    Counseling:  I have spoken with the patient and discussed todays results, in addition to providing specific details for the plan of care and counseling regarding the diagnosis and prognosis. Their questions are answered at this time and they are agreeable with the plan of admission.    --------------------------------- ADDITIONAL PROVIDER NOTES ---------------------------------  Consultations:  Time: 0105. Spoke with Dr. Joey Abreu. Discussed case. They will admit the patient. This patient's ED course included: a personal history and physicial examination and re-evaluation prior to disposition    This patient has remained hemodynamically stable during their ED course. Diagnosis:  1. Nausea and vomiting, intractability of vomiting not specified, unspecified vomiting type    2.  Type 1 diabetes mellitus with ketoacidosis without coma [facility]        Disposition:  Patient's disposition: Admit to telemetry  Patient's condition is stable. Patient was seen and evaluated by myself and my attending No att. providers found. Assessment and Plan discussed with attending provider, please see attestation for final plan of care.      DO Augie Lawson DO  Resident  01/15/22 0325

## 2022-01-15 NOTE — FLOWSHEET NOTE
Pt ran 3 hours; 4231 bath; 900 mL removed; stable/tolerated well; denies c/o. HD CVC heparin locked per lumen fill volume, capped & clamped post Tx. good Access flow no issues achieving prescribed BFR.          01/15/22 1600   Vital Signs   BP (!) 95/56   Temp 97.8 °F (36.6 °C)   Pulse 96   Resp 18   Pain Assessment   Pain Assessment 0-10   Pain Level 0   Post-Hemodialysis Assessment   Post-Treatment Procedures Blood returned;Catheter capped, clamped and heparinized x 2 ports   Machine Disinfection Process Acid/Vinegar Clean;Heat Disinfect; Exterior Machine Disinfection   Rinseback Volume (ml) 300 ml   Total Liters Processed (l/min) 51 l/min   Dialyzer Clearance Clear   Duration of Treatment (minutes) 180 minutes   Heparin amount administered during treatment (units) 0 units   Hemodialysis Intake (ml) 300 ml   Hemodialysis Output (ml) 1200 ml   NET Removed (ml) 900 ml   Tolerated Treatment Good   Patient Response to Treatment stable   Bilateral Breath Sounds Clear   Edema Generalized   Edema Generalized +1

## 2022-01-15 NOTE — ED NOTES
Attempted to go over medications and pt states she doesn't know what she is on.       Sarai Bowden RN  01/15/22 9978

## 2022-01-15 NOTE — ED NOTES
Call to floor, pt will go from dialysis to floor, call to dialysis and pt is coming off dialysis now, aware pt has a room assignment, asked to call transport, call to transport they will update the transporter.      Jake Stern RN  01/15/22 0555

## 2022-01-16 LAB
ANION GAP SERPL CALCULATED.3IONS-SCNC: 12 MMOL/L (ref 7–16)
ANION GAP SERPL CALCULATED.3IONS-SCNC: 9 MMOL/L (ref 7–16)
BUN BLDV-MCNC: 7 MG/DL (ref 6–20)
BUN BLDV-MCNC: 8 MG/DL (ref 6–20)
CALCIUM SERPL-MCNC: 7.9 MG/DL (ref 8.6–10.2)
CALCIUM SERPL-MCNC: 7.9 MG/DL (ref 8.6–10.2)
CHLORIDE BLD-SCNC: 97 MMOL/L (ref 98–107)
CHLORIDE BLD-SCNC: 98 MMOL/L (ref 98–107)
CO2: 24 MMOL/L (ref 22–29)
CO2: 25 MMOL/L (ref 22–29)
CREAT SERPL-MCNC: 2.9 MG/DL (ref 0.5–1)
CREAT SERPL-MCNC: 3.3 MG/DL (ref 0.5–1)
EKG ATRIAL RATE: 109 BPM
EKG P AXIS: 37 DEGREES
EKG P-R INTERVAL: 126 MS
EKG Q-T INTERVAL: 344 MS
EKG QRS DURATION: 84 MS
EKG QTC CALCULATION (BAZETT): 463 MS
EKG R AXIS: 14 DEGREES
EKG T AXIS: -17 DEGREES
EKG VENTRICULAR RATE: 109 BPM
GFR AFRICAN AMERICAN: 20
GFR AFRICAN AMERICAN: 23
GFR NON-AFRICAN AMERICAN: 20 ML/MIN/1.73
GFR NON-AFRICAN AMERICAN: 23 ML/MIN/1.73
GLUCOSE BLD-MCNC: 168 MG/DL (ref 74–99)
GLUCOSE BLD-MCNC: 19 MG/DL (ref 74–99)
MAGNESIUM: 1.7 MG/DL (ref 1.6–2.6)
METER GLUCOSE: 122 MG/DL (ref 74–99)
METER GLUCOSE: 136 MG/DL (ref 74–99)
METER GLUCOSE: 145 MG/DL (ref 74–99)
METER GLUCOSE: 216 MG/DL (ref 74–99)
METER GLUCOSE: 72 MG/DL (ref 74–99)
METER GLUCOSE: 74 MG/DL (ref 74–99)
METER GLUCOSE: <40 MG/DL (ref 74–99)
METER GLUCOSE: <40 MG/DL (ref 74–99)
PHOSPHORUS: 1.4 MG/DL (ref 2.5–4.5)
POTASSIUM SERPL-SCNC: 2.8 MMOL/L (ref 3.5–5)
POTASSIUM SERPL-SCNC: 4.5 MMOL/L (ref 3.5–5)
SODIUM BLD-SCNC: 131 MMOL/L (ref 132–146)
SODIUM BLD-SCNC: 134 MMOL/L (ref 132–146)

## 2022-01-16 PROCEDURE — 36415 COLL VENOUS BLD VENIPUNCTURE: CPT

## 2022-01-16 PROCEDURE — 6360000002 HC RX W HCPCS: Performed by: FAMILY MEDICINE

## 2022-01-16 PROCEDURE — 6360000002 HC RX W HCPCS: Performed by: INTERNAL MEDICINE

## 2022-01-16 PROCEDURE — 83735 ASSAY OF MAGNESIUM: CPT

## 2022-01-16 PROCEDURE — 6360000002 HC RX W HCPCS: Performed by: NURSE PRACTITIONER

## 2022-01-16 PROCEDURE — 84100 ASSAY OF PHOSPHORUS: CPT

## 2022-01-16 PROCEDURE — 2580000003 HC RX 258: Performed by: NURSE PRACTITIONER

## 2022-01-16 PROCEDURE — 2500000003 HC RX 250 WO HCPCS: Performed by: EMERGENCY MEDICINE

## 2022-01-16 PROCEDURE — 6370000000 HC RX 637 (ALT 250 FOR IP): Performed by: INTERNAL MEDICINE

## 2022-01-16 PROCEDURE — 82962 GLUCOSE BLOOD TEST: CPT

## 2022-01-16 PROCEDURE — 6370000000 HC RX 637 (ALT 250 FOR IP): Performed by: FAMILY MEDICINE

## 2022-01-16 PROCEDURE — 6360000002 HC RX W HCPCS: Performed by: EMERGENCY MEDICINE

## 2022-01-16 PROCEDURE — 80048 BASIC METABOLIC PNL TOTAL CA: CPT

## 2022-01-16 PROCEDURE — 2060000000 HC ICU INTERMEDIATE R&B

## 2022-01-16 PROCEDURE — 99232 SBSQ HOSP IP/OBS MODERATE 35: CPT | Performed by: INTERNAL MEDICINE

## 2022-01-16 RX ORDER — POTASSIUM CHLORIDE 7.45 MG/ML
10 INJECTION INTRAVENOUS PRN
Status: DISCONTINUED | OUTPATIENT
Start: 2022-01-16 | End: 2022-01-24

## 2022-01-16 RX ORDER — DEXTROSE MONOHYDRATE 25 G/50ML
12.5 INJECTION, SOLUTION INTRAVENOUS PRN
Status: DISCONTINUED | OUTPATIENT
Start: 2022-01-16 | End: 2022-01-28 | Stop reason: HOSPADM

## 2022-01-16 RX ORDER — POTASSIUM CHLORIDE 20 MEQ/1
40 TABLET, EXTENDED RELEASE ORAL PRN
Status: DISCONTINUED | OUTPATIENT
Start: 2022-01-16 | End: 2022-01-24

## 2022-01-16 RX ORDER — PREGABALIN 75 MG/1
75 CAPSULE ORAL DAILY
Status: DISCONTINUED | OUTPATIENT
Start: 2022-01-16 | End: 2022-01-27

## 2022-01-16 RX ORDER — NICOTINE POLACRILEX 4 MG
15 LOZENGE BUCCAL PRN
Status: DISCONTINUED | OUTPATIENT
Start: 2022-01-16 | End: 2022-01-28 | Stop reason: HOSPADM

## 2022-01-16 RX ORDER — FENTANYL CITRATE 50 UG/ML
25 INJECTION, SOLUTION INTRAMUSCULAR; INTRAVENOUS
Status: DISCONTINUED | OUTPATIENT
Start: 2022-01-16 | End: 2022-01-16

## 2022-01-16 RX ORDER — INSULIN GLARGINE 100 [IU]/ML
2 INJECTION, SOLUTION SUBCUTANEOUS NIGHTLY
Status: DISCONTINUED | OUTPATIENT
Start: 2022-01-16 | End: 2022-01-17

## 2022-01-16 RX ORDER — DEXTROSE MONOHYDRATE 50 MG/ML
100 INJECTION, SOLUTION INTRAVENOUS PRN
Status: DISCONTINUED | OUTPATIENT
Start: 2022-01-16 | End: 2022-01-28 | Stop reason: HOSPADM

## 2022-01-16 RX ADMIN — LEVOTHYROXINE SODIUM 75 MCG: 0.03 TABLET ORAL at 07:03

## 2022-01-16 RX ADMIN — POTASSIUM CHLORIDE 10 MEQ: 7.46 INJECTION, SOLUTION INTRAVENOUS at 08:03

## 2022-01-16 RX ADMIN — POTASSIUM CHLORIDE 10 MEQ: 7.46 INJECTION, SOLUTION INTRAVENOUS at 10:21

## 2022-01-16 RX ADMIN — HEPARIN SODIUM 5000 UNITS: 10000 INJECTION INTRAVENOUS; SUBCUTANEOUS at 13:50

## 2022-01-16 RX ADMIN — PANTOPRAZOLE SODIUM 40 MG: 40 TABLET, DELAYED RELEASE ORAL at 07:03

## 2022-01-16 RX ADMIN — HEPARIN SODIUM 5000 UNITS: 10000 INJECTION INTRAVENOUS; SUBCUTANEOUS at 06:30

## 2022-01-16 RX ADMIN — HEPARIN SODIUM 5000 UNITS: 10000 INJECTION INTRAVENOUS; SUBCUTANEOUS at 22:30

## 2022-01-16 RX ADMIN — INSULIN LISPRO 1 UNITS: 100 INJECTION, SOLUTION INTRAVENOUS; SUBCUTANEOUS at 17:04

## 2022-01-16 RX ADMIN — MIRTAZAPINE 15 MG: 15 TABLET, FILM COATED ORAL at 22:41

## 2022-01-16 RX ADMIN — CALCIUM GLUCONATE 2000 MG: 98 INJECTION, SOLUTION INTRAVENOUS at 13:30

## 2022-01-16 RX ADMIN — POTASSIUM CHLORIDE 10 MEQ: 7.46 INJECTION, SOLUTION INTRAVENOUS at 04:34

## 2022-01-16 RX ADMIN — POTASSIUM CHLORIDE 10 MEQ: 7.46 INJECTION, SOLUTION INTRAVENOUS at 09:14

## 2022-01-16 RX ADMIN — POTASSIUM CHLORIDE 10 MEQ: 7.46 INJECTION, SOLUTION INTRAVENOUS at 06:39

## 2022-01-16 RX ADMIN — FENTANYL CITRATE 25 MCG: 50 INJECTION INTRAMUSCULAR; INTRAVENOUS at 00:51

## 2022-01-16 RX ADMIN — DEXTROSE MONOHYDRATE 12.5 G: 25 INJECTION, SOLUTION INTRAVENOUS at 03:10

## 2022-01-16 RX ADMIN — DEXTROSE MONOHYDRATE 12.5 G: 25 INJECTION, SOLUTION INTRAVENOUS at 07:07

## 2022-01-16 RX ADMIN — ROPINIROLE 0.25 MG: 0.25 TABLET, FILM COATED ORAL at 22:40

## 2022-01-16 RX ADMIN — ARIPIPRAZOLE 5 MG: 5 TABLET ORAL at 22:40

## 2022-01-16 RX ADMIN — PANTOPRAZOLE SODIUM 40 MG: 40 TABLET, DELAYED RELEASE ORAL at 17:04

## 2022-01-16 RX ADMIN — HYDROMORPHONE HYDROCHLORIDE 0.25 MG: 1 INJECTION, SOLUTION INTRAMUSCULAR; INTRAVENOUS; SUBCUTANEOUS at 18:12

## 2022-01-16 RX ADMIN — PREGABALIN 75 MG: 75 CAPSULE ORAL at 10:24

## 2022-01-16 RX ADMIN — HYDROMORPHONE HYDROCHLORIDE 0.25 MG: 1 INJECTION, SOLUTION INTRAMUSCULAR; INTRAVENOUS; SUBCUTANEOUS at 09:15

## 2022-01-16 RX ADMIN — POTASSIUM CHLORIDE 10 MEQ: 7.46 INJECTION, SOLUTION INTRAVENOUS at 05:34

## 2022-01-16 ASSESSMENT — PAIN SCALES - GENERAL
PAINLEVEL_OUTOF10: 8
PAINLEVEL_OUTOF10: 5
PAINLEVEL_OUTOF10: 8
PAINLEVEL_OUTOF10: 5
PAINLEVEL_OUTOF10: 8
PAINLEVEL_OUTOF10: 8

## 2022-01-16 ASSESSMENT — PAIN DESCRIPTION - LOCATION
LOCATION: BACK;GENERALIZED
LOCATION: BACK;GENERALIZED

## 2022-01-16 ASSESSMENT — PAIN DESCRIPTION - PAIN TYPE
TYPE: CHRONIC PAIN
TYPE: CHRONIC PAIN

## 2022-01-16 NOTE — PROGRESS NOTES
Department of Internal Medicine  Nephrology Nurse Practitioner Progress Note    Events Reviewed. Subjective: We are following Ms. Khadijah Buckner for ESRD on HD. She is sleepy today. Denies complaints. No appetite.        PHYSICAL EXAM:      Vitals:    VITALS:  /82   Pulse 120   Temp 97.5 °F (36.4 °C) (Oral)   Resp 18   Ht 5' 4\" (1.626 m)   Wt 140 lb (63.5 kg)   SpO2 98%   BMI 24.03 kg/m²   24HR INTAKE/OUTPUT:      Intake/Output Summary (Last 24 hours) at 1/16/2022 1133  Last data filed at 1/15/2022 1600  Gross per 24 hour   Intake 300 ml   Output 1200 ml   Net -900 ml       Access: RIJ tunneled dialysis catheter  Constitutional:  Lethargic, but wakens to voice  HEENT:  PERRL, normocephalic, atraumatic  Respiratory:  CTA bilateral   Cardiovascular/Edema:  ST, RRR, no murmur gallop or rub  Gastrointestinal:  abd distended, c/o persistent abdominal pain  Neurologic:  Lethargic, awakens to voice, follows commands, no focal deficit  Skin:  Warm, dry, ecchymotic areas to abdomen    Scheduled Meds:   insulin glargine  2 Units SubCUTAneous Nightly    insulin lispro  0-4 Units SubCUTAneous TID WC    pregabalin  75 mg Oral Daily    ARIPiprazole  5 mg Oral Nightly    levothyroxine  75 mcg Oral Daily    mirtazapine  15 mg Oral Nightly    pantoprazole  40 mg Oral BID AC    rOPINIRole  0.25 mg Oral Nightly    heparin (porcine)  5,000 Units SubCUTAneous Q8H    [START ON 1/17/2022] epoetin nena-epbx  3,000 Units SubCUTAneous Once per day on Mon Wed Fri    influenza virus vaccine  0.5 mL IntraMUSCular Prior to discharge     Continuous Infusions:   dextrose       PRN Meds:.glucose, dextrose, glucagon (rDNA), dextrose, potassium chloride **OR** potassium alternative oral replacement **OR** potassium chloride, HYDROmorphone, dextrose, polyethylene glycol    DATA:    CBC with Differential:    Lab Results   Component Value Date    WBC 7.7 01/14/2022    RBC 3.70 01/14/2022    HGB 9.6 01/14/2022    HCT 29.8 01/14/2022     01/14/2022    MCV 80.5 01/14/2022    MCH 25.9 01/14/2022    MCHC 32.2 01/14/2022    RDW 19.6 01/14/2022    NRBC 0.9 08/10/2020    SEGSPCT 67 06/27/2013    METASPCT 1.7 08/10/2020    LYMPHOPCT 20.7 01/14/2022    MONOPCT 3.3 01/14/2022    BASOPCT 0.4 01/14/2022    MONOSABS 0.25 01/14/2022    LYMPHSABS 1.59 01/14/2022    EOSABS 0.05 01/14/2022    BASOSABS 0.03 01/14/2022     CMP:    Lab Results   Component Value Date     01/16/2022    K 2.8 01/16/2022    K 4.2 11/24/2021    CL 98 01/16/2022    CO2 24 01/16/2022    BUN 7 01/16/2022    CREATININE 2.9 01/16/2022    GFRAA 23 01/16/2022    LABGLOM 23 01/16/2022    GLUCOSE 19 01/16/2022    GLUCOSE 130 05/18/2012    PROT 6.9 01/14/2022    LABALBU 3.4 01/14/2022    LABALBU 4.1 05/18/2012    CALCIUM 7.9 01/16/2022    BILITOT 0.4 01/14/2022    ALKPHOS 204 01/14/2022    AST 22 01/14/2022    ALT 14 01/14/2022     Magnesium:    Lab Results   Component Value Date    MG 1.7 01/15/2022     Phosphorus:    Lab Results   Component Value Date    PHOS 2.7 01/15/2022     Radiology Review:                Chest Xray 1/14/22   Suspect early bibasilar infiltrates. Brief Summary of Initial Consult: Briefly, Miss Margo Suggs is a 36year-old female with a PMH of ESRD on home hemodialysis 5 days/wk, (initiated September 2021, Type I DM, poorly controlled with recurrent admissions for DKA, HTN, gastroparesis, ascites, infective endocarditis, cardiac arrest, hypothyroidism and depression. She was recently admitted earlier this month after testing positive for COVID 19. She presented to the ER on January 14th, 2022 with complaints of nausea and vomiting for one month and hyperglycemia. She states she has not been compliant with insulin therapy recently. In the ER she was found to be in DKA with blood sugar of 400 And beta hydroxy greater than 4.5. Other pertinent lab work revealed sodium 132, chloride 92, anion gap 19, BUN 13, Creatinine 4.5, lactic acid 2.3.  She was started on DKA protocol and IVFs in the ER. Renal is consulted for ESRD on HD and DKA. Problems Resolved:   DKA,  beta hydroxy greater than 4.5. S/P insulin drip. Endocrinology following   Hyponatremia, 2/2 decreased free water excretion from ESRD. 1L fluid restriction. IMPRESSION/RECOMMENDATIONS:       1. ESRD on home hemodialysis 4 days/wk (per patient) via RIJ tunneled dialysis catheter. States last HD treatment was Thursday. HD initiated September 2021 after failed peritoneal dialysis with persistent hyperkalemia. To continue HD MWF while inpatient.     2. Hypokalemia, 2/2 GI losses from vomiting and poor oral intake  3. Hypocalcemia, to obtain Vitamin D level. To Replace  4. HTN, on lopressor and amlodipine at home  5. Vitamin D deficiency, on ergocalciferol at home  6. 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 Anemia of CKD,  Hgb 9.6 mg/dL today, start epoetin alpha 3,000 unit 3 times/wk  8. Type I DM, poorly controlled with frequent readmissions for DKA  ------------------------------------------------------  9. Hx Covid 19, unvaccinated  10. Restless leg syndrome, on ropinirole  11. Depression, on Abilify  12. Hypothyroidism, on levothyroxine   13. History of gastroparesis, on metoclopramide          Plan:     · HD M-W-F while inpatient  · Continue epoetin alpha 3,000 units 3 times/wk  · Replace potassium   · Replace calcium   · Monitor labs daily  · Monitor glucose levels  · Obtain Vitamin D, Ionized Calcium, and PTH in AM      Thank you very much Sarah Oliveros DO for allowing us to participate in the care of Ms. [address] [facility].

## 2022-01-16 NOTE — PROGRESS NOTES
Hospitalist Progress Note      SYNOPSIS: Patient admitted on 2022 for DKA. Pt with ESRD    Ms. [address], a 36y.o. year old female  who  has a past medical history of Acute congestive heart failure, ESRD on Dialysis, Cardiac arrest, Depression, Diabetes mellitus, Diabetic gastroparesis associated with type 1 diabetes mellitus, Diabetic ketoacidosis with coma associated with type 1 diabetes mellitus, Diabetic polyneuropathy, Endocarditis, Hyperlipidemia, Hyperosmolar hyperglycemic state, Hypothyroidism, Iron deficiency anemia, multiple drug resistant organisms resistance, MRSA, Non compliance w medication regimen, Previous stillbirth or demise, antepartum, Seizure Severe pre-eclampsia in third trimester, Shock liver, and Valvular endocarditis.      Patient presented to the emergency with complaints of nausea and vomiting. She has vomited 7 times on day of admission. Not able to keep anything down. She  Has a history of recent COVID diagnosis. Laboratory studies reveal potassium 3.2, creatinine 4.0, anion gap 18, glucose 327, calcium 7.4, troponin 162, alk phos 204, beta hydroxybutyrate >4.50, hemoglobin 9.6. COVID-19 negative. pH 7.31. Patient was started on IV fluids and insulin infusion for DKA. Nephrology and Endocrinology were consulted. Pt underwent HD as well. Gap closed    SUBJECTIVE:    Patient seen and examined  Records reviewed. The pt is sleepy denies any specific complaints. Developed hypoglycemia <40 overnight. That has since improved to 122 with D5  Temp (24hrs), Av °F (36.7 °C), Min:97.5 °F (36.4 °C), Max:98.4 °F (36.9 °C)    DIET: ADULT DIET;  Regular; 3 carb choices (45 gm/meal)  CODE: Full Code    Intake/Output Summary (Last 24 hours) at 2022 0918  Last data filed at 1/15/2022 1600  Gross per 24 hour   Intake 300 ml   Output 1200 ml   Net -900 ml       OBJECTIVE:    /82   Pulse 120   Temp 97.5 °F (36.4 °C) (Oral)   Resp 18   Ht 5' 4\" (1.626 m)   [employer] 140 lb (63.5 kg)   SpO2 98%   BMI 24.03 kg/m²     General appearance: No apparent distress, appears stated age and cooperative. HEENT:  Conjunctivae/corneas clear. Neck: Supple. No jugular venous distention. Respiratory: Clear to auscultation bilaterally, normal respiratory effort  Cardiovascular: Regular rate rhythm, normal S1-S2  Abdomen: Soft, nontender, nondistended  Musculoskeletal: No clubbing, cyanosis, no bilateral lower extremity edema. Brisk capillary refill. Skin:  No rashes  on visible skin  Neurologic: awake, alert and following commands     ASSESSMENT:    Diabetic ketoacidosis  Hypokalemia  Elevated troponin in the setting of ESRD  End-stage renal disease on hemodialysis  Chronic anemia  Type 1 diabetes   Hypoglycemia. History of Cardiac arrest  Diabetic gastroparesis  History of Endocarditis       PLAN:    Anion gap closed. Continue to monitor labs. Insulins per Endocrinology. Currently on 7 Units of lantus daily and low dose SSI and still developed hypoglycemia. Glucose check every hour for now and then ACHS once stable. Pt may get an insulin pump after discharge. Replace potassium  Cont Synthroid. Pt underwent dialysis yesterday. nephrology onboard. Stop Fentanyl. Cont Remeron  Cont Protonix. Heparin for VTE prophylaxis.        DISPOSITION:     Medications:  REVIEWED DAILY    Infusion Medications    dextrose       Scheduled Medications    insulin glargine  2 Units SubCUTAneous Nightly    insulin lispro  0-4 Units SubCUTAneous TID WC    ARIPiprazole  5 mg Oral Nightly    levothyroxine  75 mcg Oral Daily    mirtazapine  15 mg Oral Nightly    pantoprazole  40 mg Oral BID AC    rOPINIRole  0.25 mg Oral Nightly    heparin (porcine)  5,000 Units SubCUTAneous Q8H    [START ON 1/17/2022] epoetin nena-epbx  3,000 Units SubCUTAneous Once per day on Mon Wed Fri    influenza virus vaccine  0.5 mL IntraMUSCular Prior to discharge     PRN Meds: glucose, dextrose, glucagon (rDNA), dextrose, potassium chloride **OR** potassium alternative oral replacement **OR** potassium chloride, HYDROmorphone, dextrose, polyethylene glycol    Labs:     Recent Labs     01/14/22  2105   WBC 7.7   HGB 9.6*   HCT 29.8*          Recent Labs     01/15/22  0450 01/15/22  0450 01/15/22  0946 01/15/22  1900 01/16/22  0300   *   < > 132 131* 134   K 3.1*   < > 2.9* 3.5 2.8*   CL 96*   < > 98 97* 98   CO2 24   < > 25 24 24   BUN 13   < > 14 6 7   CREATININE 4.2*   < > 4.5* 2.5* 2.9*   CALCIUM 7.6*   < > 7.6* 7.4* 7.9*   PHOS 2.5  --  2.7  --   --     < > = values in this interval not displayed. Recent Labs     01/14/22  2105   PROT 6.9   ALKPHOS 204*   ALT 14   AST 22   BILITOT 0.4       No results for input(s): INR in the last 72 hours. No results for input(s): [facility] in the last 72 hours. Chronic labs:    Lab Results   Component Value Date    CHOL 95 01/14/2020    TRIG 82 01/14/2020    HDL 33 01/14/2020    LDLCALC 46 01/14/2020    TSH 5.000 (H) 11/24/2021    INR 1.1 12/08/2021    LABA1C 8.7 (H) 12/08/2021       Radiology: REVIEWED DAILY    +++++++++++++++++++++++++++++++++++++++++++++++++  Daniel Martinez MD  [facility] Physician - [address] [facility], [facility]  +++++++++++++++++++++++++++++++++++++++++++++++++  NOTE: This report was transcribed using voice recognition software. Every effort was made to ensure accuracy; however, inadvertent computerized transcription errors may be present.

## 2022-01-16 NOTE — PROGRESS NOTES
ENDOCRINOLOGY PROGRESS NOTE      Date of admission: 1/14/2022  Date of service: 1/16/2022  Admitting physician: Leann Lincoln DO   Primary Care Physician: Chaz Lacey MD  Consultant physician: Ashley Nieves MD     Reason for the consultation:  Uncontrolled DM,labile BG    History of Present Illness: The history is provided by the patient. Accuracy of the patient data is excellent    [address] is a very pleasant 36 y.o. old female with PMH of Type I DM, ESRD on PD,HTN,hypothyroidism, infective endocarditis and other listed below admitted to [facility] on 1/14/2022 because of N/V, abdominal pain and found to be in DKA. Endocrine service was consulted for DM management.      Subjective   Seen and examined this AM, BG dropped very low overnight     Inpatient diet:   Poor appetite, on Renal/Carb Restricted diet     Point of care glucose monitoring (Independently reviewed)   Recent Labs     01/15/22  2147 01/16/22  0243 01/16/22  0244 01/16/22  0344 01/16/22  0701 01/16/22  0702 01/16/22  1130 01/16/22  1658   GLUMET 115* <40* <40* 122* 74 72* 145* 216*       Scheduled Meds:   insulin glargine  2 Units SubCUTAneous Nightly    insulin lispro  0-4 Units SubCUTAneous TID WC    pregabalin  75 mg Oral Daily    ARIPiprazole  5 mg Oral Nightly    levothyroxine  75 mcg Oral Daily    mirtazapine  15 mg Oral Nightly    pantoprazole  40 mg Oral BID AC    rOPINIRole  0.25 mg Oral Nightly    heparin (porcine)  5,000 Units SubCUTAneous Q8H    [START ON 1/17/2022] epoetin nena-epbx  3,000 Units SubCUTAneous Once per day on Mon Wed Fri    influenza virus vaccine  0.5 mL IntraMUSCular Prior to discharge     PRN Meds:   glucose, 15 g, PRN  dextrose, 12.5 g, PRN  glucagon (rDNA), 1 mg, PRN  dextrose, 100 mL/hr, PRN  potassium chloride, 40 mEq, PRN   Or  potassium alternative oral replacement, 40 mEq, PRN   Or  potassium chloride, 10 mEq, PRN  HYDROmorphone, 0.25 mg, Q8H PRN  dextrose, 12.5 g, PRN  polyethylene glycol, 17 g, Daily PRN      Continuous Infusions:   dextrose         Review of Systems  All systems reviewed. All negative except for symptoms mentioned in HPI     OBJECTIVE    /82   Pulse 120   Temp 97.5 °F (36.4 °C) (Oral)   Resp 18   Ht 5' 4\" (1.626 m)   Wt 140 lb (63.5 kg)   SpO2 98%   BMI 24.03 kg/m²   No intake or output data in the 24 hours ending 01/16/22 1839    Physical examination:  General: awake alert, oriented x3, no abnormal position or movements. HEENT: normocephalic non-traumatic, no exophthalmos   Neck: supple, no LN enlargement, no thyromegaly, no thyroid tenderness, no JVD. Pulm: Clear equal air entry no added sounds, no wheezing or rhonchi    CVS: S1 + S2, no murmur, no heave  Abd: soft lax, no tenderness, no organomegaly, audible bowel sounds. Skin: warm, no lesions, no rash. Feet: sensory exam of the feet is decreased   Neuro: CN intact, muscle power normal  Psych: normal mood, and affect    Review of Laboratory Data:  I personally reviewed the following labs:   Recent Labs     01/14/22 2105   WBC 7.7   RBC 3.70   HGB 9.6*   HCT 29.8*   MCV 80.5   MCH 25.9*   MCHC 32.2   RDW 19.6*      MPV 11.0     Recent Labs     01/14/22  2105 01/14/22  2307 01/15/22  1900 01/16/22  0300 01/16/22  1340      < > 131* 134 131*   K 4.2   < > 3.5 2.8* 4.5   CL 90*   < > 97* 98 97*   CO2 23   < > 24 24 25   BUN 13   < > 6 7 8   CREATININE 4.5*   < > 2.5* 2.9* 3.3*   GLUCOSE 400*   < > 167* 19* 168*   CALCIUM 8.8   < > 7.4* 7.9* 7.9*   PROT 6.9  --   --   --   --    LABALBU 3.4*  --   --   --   --    BILITOT 0.4  --   --   --   --    ALKPHOS 204*  --   --   --   --    AST 22  --   --   --   --    ALT 14  --   --   --   --     < > = values in this interval not displayed.      Beta-Hydroxybutyrate   Date Value Ref Range Status   01/14/2022 >4.50 (H) 0.02 - 0.27 mmol/L Final   12/31/2021 >4.50 (H) 0.02 - 0.27 mmol/L Final   11/22/2021 0.53 (H) 0.02 - 0.27 mmol/L Final     Lab Results Component Value Date    LABA1C 8.7 12/08/2021    LABA1C 10.2 11/23/2021    LABA1C 10.6 09/28/2021     Lab Results   Component Value Date/Time    TSH 5.000 (H) 11/24/2021 08:52 AM    T4FREE 1.16 11/24/2021 08:52 AM    J5WRIYF 8.6 11/05/2015 02:20 AM    FT3 1.3 (L) 10/31/2020 10:43 AM    Y2NFBSH 36.73 (L) 07/20/2020 02:00 PM     Lab Results   Component Value Date    LABA1C 8.7 12/08/2021    GLUCOSE 168 01/16/2022    GLUCOSE 130 05/18/2012    MALBCR 2949.3 01/15/2020    LABMICR 1740.1 01/15/2020    LABCREA 59 01/15/2020    LABCREA 61 01/15/2020     Lab Results   Component Value Date    TRIG 82 01/14/2020    HDL 33 01/14/2020    LDLCALC 46 01/14/2020    CHOL 95 01/14/2020       Blood culture   Lab Results   Component Value Date    BC 5 Days no growth 01/01/2022    BC 5 Days no growth 11/22/2021       Radiology:  XR CHEST PORTABLE   Final Result   Suspect early bibasilar infiltrates. Medical Records/Labs/Images review:   I personally reviewed and summarized previous records   All labs and imaging were reviewed independently     [address], a 36 y.o.-old female seen today for inpatient diabetes management     Diabetes Mellitus type I    · Uncontrolled very brittle DM   · Pt with DM type 1, need long acting insulin all the time to prevent DKA   · Insulin drip was turned off last night and didn't receive long acting insulin   · Will adjust  diabetes regimen to    · Lantus 2  units daily   · Modified Low dose sliding scale 1:80>180 with meals and at night   · Continue glucose check with meals and at bedtime   · Will titrate insulin dose based on the blood glucose trend & insulin requirement  · We will arrange for the patient to follow with us after discharge    primary Hypothyroidism  · On levothyroxine 75 mcg daily.  She is missing doses   · To recheck TFT 6-8 weeks after discharge     N/V/   · symptomatic Management per primary  service     ESRD  · Pt at risk for hypoglycemia  · On dialysis, nephrology following    The above issues were reviewed with the patient who understood and agreed with the plan. Thank you for allowing us to participate in the care of this patient. Please do not hesitate to contact us with any additional questions. Dede Coley MD  Endocrinologist, [facility] - [facility] Diabetes Care and Endocrinology   [address] [facility] [address] [address]   Phone: [phone]  Fax: [phone]  ----------------------------  An electronic signature was used to authenticate this note.  Hussein Leiva MD on 1/16/2022 at 6:39 PM

## 2022-01-16 NOTE — PROGRESS NOTES
Pt used call button and informed me she felt her blood sugar was low. I checked it twice with a glucometer it showed too low to register. I have pt orange juice and prn D5 IV, also sent a stat BMP to get reading. Will check again shortly to see if glucometer reading works. Pt is alert and awake just feels shaky, HR was elevated to high 139, is coming back down post D5 to 123. Recheck BG was 122, and HR is 105 pt feel better at this time.

## 2022-01-16 NOTE — PROGRESS NOTES
Potassium came back at 2.8 on BMP, perfect serve message sent to Dr. Hung Henriquez requesting prn potassium replacement orders be placed.

## 2022-01-16 NOTE — PROGRESS NOTES
Dr. Jeffrey Blanc in regards to blood sugars overnight and if he would like 7 units of lantus given or an adjusted dose.

## 2022-01-17 LAB
ANION GAP SERPL CALCULATED.3IONS-SCNC: 7 MMOL/L (ref 7–16)
BUN BLDV-MCNC: 10 MG/DL (ref 6–20)
CALCIUM IONIZED: 1.17 MMOL/L (ref 1.15–1.33)
CALCIUM SERPL-MCNC: 7.9 MG/DL (ref 8.6–10.2)
CHLORIDE BLD-SCNC: 101 MMOL/L (ref 98–107)
CO2: 23 MMOL/L (ref 22–29)
CREAT SERPL-MCNC: 3.6 MG/DL (ref 0.5–1)
GFR AFRICAN AMERICAN: 18
GFR NON-AFRICAN AMERICAN: 18 ML/MIN/1.73
GLUCOSE BLD-MCNC: 106 MG/DL (ref 74–99)
MAGNESIUM: 1.5 MG/DL (ref 1.6–2.6)
METER GLUCOSE: 101 MG/DL (ref 74–99)
METER GLUCOSE: 131 MG/DL (ref 74–99)
METER GLUCOSE: 89 MG/DL (ref 74–99)
METER GLUCOSE: 89 MG/DL (ref 74–99)
METER GLUCOSE: 90 MG/DL (ref 74–99)
PARATHYROID HORMONE INTACT: 26 PG/ML (ref 15–65)
PHOSPHORUS: 1.3 MG/DL (ref 2.5–4.5)
POTASSIUM SERPL-SCNC: 4.3 MMOL/L (ref 3.5–5)
SODIUM BLD-SCNC: 131 MMOL/L (ref 132–146)
VITAMIN D 25-HYDROXY: 8 NG/ML (ref 30–100)

## 2022-01-17 PROCEDURE — 2580000003 HC RX 258

## 2022-01-17 PROCEDURE — 84100 ASSAY OF PHOSPHORUS: CPT

## 2022-01-17 PROCEDURE — 6370000000 HC RX 637 (ALT 250 FOR IP): Performed by: INTERNAL MEDICINE

## 2022-01-17 PROCEDURE — 82306 VITAMIN D 25 HYDROXY: CPT

## 2022-01-17 PROCEDURE — 6360000002 HC RX W HCPCS: Performed by: NURSE PRACTITIONER

## 2022-01-17 PROCEDURE — 90935 HEMODIALYSIS ONE EVALUATION: CPT

## 2022-01-17 PROCEDURE — 82330 ASSAY OF CALCIUM: CPT

## 2022-01-17 PROCEDURE — 83735 ASSAY OF MAGNESIUM: CPT

## 2022-01-17 PROCEDURE — 6360000002 HC RX W HCPCS: Performed by: INTERNAL MEDICINE

## 2022-01-17 PROCEDURE — 2060000000 HC ICU INTERMEDIATE R&B

## 2022-01-17 PROCEDURE — 5A1D70Z PERFORMANCE OF URINARY FILTRATION, INTERMITTENT, LESS THAN 6 HOURS PER DAY: ICD-10-PCS | Performed by: INTERNAL MEDICINE

## 2022-01-17 PROCEDURE — 99232 SBSQ HOSP IP/OBS MODERATE 35: CPT | Performed by: INTERNAL MEDICINE

## 2022-01-17 PROCEDURE — 83970 ASSAY OF PARATHORMONE: CPT

## 2022-01-17 PROCEDURE — P9047 ALBUMIN (HUMAN), 25%, 50ML: HCPCS | Performed by: INTERNAL MEDICINE

## 2022-01-17 PROCEDURE — 6360000002 HC RX W HCPCS: Performed by: FAMILY MEDICINE

## 2022-01-17 PROCEDURE — 80048 BASIC METABOLIC PNL TOTAL CA: CPT

## 2022-01-17 PROCEDURE — 6370000000 HC RX 637 (ALT 250 FOR IP): Performed by: FAMILY MEDICINE

## 2022-01-17 PROCEDURE — 82962 GLUCOSE BLOOD TEST: CPT

## 2022-01-17 RX ORDER — SODIUM CHLORIDE 0.9 % (FLUSH) 0.9 %
SYRINGE (ML) INJECTION
Status: COMPLETED
Start: 2022-01-17 | End: 2022-01-17

## 2022-01-17 RX ORDER — INSULIN GLARGINE 100 [IU]/ML
2 INJECTION, SOLUTION SUBCUTANEOUS NIGHTLY
Status: DISCONTINUED | OUTPATIENT
Start: 2022-01-17 | End: 2022-01-18

## 2022-01-17 RX ORDER — ALBUMIN (HUMAN) 12.5 G/50ML
50 SOLUTION INTRAVENOUS ONCE
Status: COMPLETED | OUTPATIENT
Start: 2022-01-17 | End: 2022-01-17

## 2022-01-17 RX ORDER — INSULIN GLARGINE 100 [IU]/ML
1 INJECTION, SOLUTION SUBCUTANEOUS NIGHTLY
Status: DISCONTINUED | OUTPATIENT
Start: 2022-01-17 | End: 2022-01-17

## 2022-01-17 RX ADMIN — HYDROMORPHONE HYDROCHLORIDE 0.25 MG: 1 INJECTION, SOLUTION INTRAMUSCULAR; INTRAVENOUS; SUBCUTANEOUS at 20:44

## 2022-01-17 RX ADMIN — INSULIN GLARGINE 2 UNITS: 100 INJECTION, SOLUTION SUBCUTANEOUS at 20:47

## 2022-01-17 RX ADMIN — HYDROMORPHONE HYDROCHLORIDE 0.25 MG: 1 INJECTION, SOLUTION INTRAMUSCULAR; INTRAVENOUS; SUBCUTANEOUS at 11:56

## 2022-01-17 RX ADMIN — PANTOPRAZOLE SODIUM 40 MG: 40 TABLET, DELAYED RELEASE ORAL at 07:01

## 2022-01-17 RX ADMIN — PANTOPRAZOLE SODIUM 40 MG: 40 TABLET, DELAYED RELEASE ORAL at 17:18

## 2022-01-17 RX ADMIN — PREGABALIN 75 MG: 75 CAPSULE ORAL at 09:05

## 2022-01-17 RX ADMIN — ALBUMIN (HUMAN) 50 G: 0.25 INJECTION, SOLUTION INTRAVENOUS at 13:16

## 2022-01-17 RX ADMIN — SODIUM CHLORIDE, PRESERVATIVE FREE: 5 INJECTION INTRAVENOUS at 17:18

## 2022-01-17 RX ADMIN — LEVOTHYROXINE SODIUM 75 MCG: 0.03 TABLET ORAL at 07:01

## 2022-01-17 RX ADMIN — HEPARIN SODIUM 5000 UNITS: 10000 INJECTION INTRAVENOUS; SUBCUTANEOUS at 06:30

## 2022-01-17 RX ADMIN — HYDROMORPHONE HYDROCHLORIDE 0.25 MG: 1 INJECTION, SOLUTION INTRAMUSCULAR; INTRAVENOUS; SUBCUTANEOUS at 03:39

## 2022-01-17 RX ADMIN — EPOETIN ALFA-EPBX 3000 UNITS: 3000 INJECTION, SOLUTION INTRAVENOUS; SUBCUTANEOUS at 09:05

## 2022-01-17 RX ADMIN — HEPARIN SODIUM 5000 UNITS: 10000 INJECTION INTRAVENOUS; SUBCUTANEOUS at 20:47

## 2022-01-17 ASSESSMENT — PAIN DESCRIPTION - PAIN TYPE
TYPE: CHRONIC PAIN
TYPE: CHRONIC PAIN

## 2022-01-17 ASSESSMENT — PAIN DESCRIPTION - LOCATION
LOCATION: ABDOMEN
LOCATION: BACK;GENERALIZED

## 2022-01-17 ASSESSMENT — PAIN SCALES - GENERAL
PAINLEVEL_OUTOF10: 7
PAINLEVEL_OUTOF10: 6
PAINLEVEL_OUTOF10: 8
PAINLEVEL_OUTOF10: 7

## 2022-01-17 NOTE — PROGRESS NOTES
Spoke with lab about specimens needing gold tops. I was told I could use a red top in place for them. Green and red tops sent to lab at this time.

## 2022-01-17 NOTE — PROGRESS NOTES
Department of Internal Medicine  Nephrology Nurse Practitioner Progress Note    Events Reviewed. Subjective: We are following Ms. Khadijah Buckner for ESRD on HD. Seen on dialysis, tolerating well. Complaining of ongoing nausea and abdominal pain.       PHYSICAL EXAM:      Vitals:    VITALS:  BP (!) 168/108   Pulse 113   Temp 98.7 °F (37.1 °C)   Resp 20   Ht 5' 4\" (1.626 m)   Wt 142 lb 13.7 oz (64.8 kg)   SpO2 98%   BMI 24.52 kg/m²   24HR INTAKE/OUTPUT:    No intake or output data in the 24 hours ending 01/17/22 1358    Access: RIJ tunneled dialysis catheter  Constitutional:  Lethargic, but wakens to voice  HEENT:  PERRL, normocephalic, atraumatic  Respiratory:  CTA bilateral   Cardiovascular/Edema:  ST, RRR, no murmur gallop or rub  Gastrointestinal:  abd distended, c/o persistent abdominal pain  Neurologic:  Lethargic, awakens to voice, follows commands, no focal deficit  Skin:  Warm, dry, ecchymotic areas to abdomen    Scheduled Meds:   albumin human  50 g IntraVENous Once    insulin glargine  2 Units SubCUTAneous Nightly    insulin lispro  0-4 Units SubCUTAneous TID WC    pregabalin  75 mg Oral Daily    ARIPiprazole  5 mg Oral Nightly    levothyroxine  75 mcg Oral Daily    mirtazapine  15 mg Oral Nightly    pantoprazole  40 mg Oral BID AC    rOPINIRole  0.25 mg Oral Nightly    heparin (porcine)  5,000 Units SubCUTAneous Q8H    epoetin nena-epbx  3,000 Units SubCUTAneous Once per day on Mon Wed Fri    influenza virus vaccine  0.5 mL IntraMUSCular Prior to discharge     Continuous Infusions:   dextrose       PRN Meds:.glucose, dextrose, glucagon (rDNA), dextrose, potassium chloride **OR** potassium alternative oral replacement **OR** potassium chloride, HYDROmorphone, dextrose, polyethylene glycol    DATA:    CBC with Differential:    Lab Results   Component Value Date    WBC 7.7 01/14/2022    RBC 3.70 01/14/2022    HGB 9.6 01/14/2022    HCT 29.8 01/14/2022     01/14/2022    MCV 80.5 01/14/2022    MCH 25.9 01/14/2022    MCHC 32.2 01/14/2022    RDW 19.6 01/14/2022    NRBC 0.9 08/10/2020    SEGSPCT 67 06/27/2013    METASPCT 1.7 08/10/2020    LYMPHOPCT 20.7 01/14/2022    MONOPCT 3.3 01/14/2022    BASOPCT 0.4 01/14/2022    MONOSABS 0.25 01/14/2022    LYMPHSABS 1.59 01/14/2022    EOSABS 0.05 01/14/2022    BASOSABS 0.03 01/14/2022     CMP:    Lab Results   Component Value Date     01/17/2022    K 4.3 01/17/2022    K 4.2 11/24/2021     01/17/2022    CO2 23 01/17/2022    BUN 10 01/17/2022    CREATININE 3.6 01/17/2022    GFRAA 18 01/17/2022    LABGLOM 18 01/17/2022    GLUCOSE 106 01/17/2022    GLUCOSE 130 05/18/2012    PROT 6.9 01/14/2022    LABALBU 3.4 01/14/2022    LABALBU 4.1 05/18/2012    CALCIUM 7.9 01/17/2022    BILITOT 0.4 01/14/2022    ALKPHOS 204 01/14/2022    AST 22 01/14/2022    ALT 14 01/14/2022     Magnesium:    Lab Results   Component Value Date    MG 1.5 01/17/2022     Phosphorus:    Lab Results   Component Value Date    PHOS 1.3 01/17/2022     Radiology Review:                Chest Xray 1/14/22   Suspect early bibasilar infiltrates. Brief Summary of Initial Consult:     Briefly, Miss Christine Miller is a 36year-old female with a PMH of ESRD on home hemodialysis 5 days/wk, (initiated September 2021, Type I DM, poorly controlled with recurrent admissions for DKA, HTN, gastroparesis, ascites, infective endocarditis, cardiac arrest, hypothyroidism and depression. She was recently admitted earlier this month after testing positive for COVID 19. She presented to the ER on January 14th, 2022 with complaints of nausea and vomiting for one month and hyperglycemia. She states she has not been compliant with insulin therapy recently. In the ER she was found to be in DKA with blood sugar of 400 And beta hydroxy greater than 4.5. Other pertinent lab work revealed sodium 132, chloride 92, anion gap 19, BUN 13, Creatinine 4.5, lactic acid 2.3.  She was started on DKA protocol and IVFs in the ER. Renal is consulted for ESRD on HD and DKA. Problems Resolved:   · DKA,  beta hydroxy greater than 4.5. S/P insulin drip. Endocrinology following   · Hyponatremia, 2/2 decreased free water excretion from ESRD. 1L fluid restriction. · Hypokalemia, 2/2 GI losses from vomiting and poor oral intake  · Hypocalcemia, to obtain Vitamin D level. To Replace    IMPRESSION/RECOMMENDATIONS:       1. ESRD on home hemodialysis 4 days/wk (per patient) via RIJ tunneled dialysis catheter. States last HD treatment was Thursday. HD initiated September 2021 after failed peritoneal dialysis with persistent hyperkalemia. To continue HD MWF while inpatient. 2. Hyponatremia, 2/2 decreased free water excretion from ESRD. 1L fluid restriction. 2. HTN, on lopressor and amlodipine at home  3. Vitamin D deficiency, on ergocalciferol at home  4. MBD of CKD, on sevelamer at home  5. Anemia of CKD,  Hgb 9.6 mg/dL today, start epoetin alpha 3,000 unit 3 times/wk  6. Type I DM, poorly controlled with frequent readmissions for DKA  ------------------------------------------------------  9. Hx Covid 19, unvaccinated  10. Restless leg syndrome, on ropinirole  11. Depression, on Abilify  12. Hypothyroidism, on levothyroxine   13. History of gastroparesis, on metoclopramide          Plan:     · HD today and three times/ wk M-W-F while inpatient.  Seen on dialysis, tolerating well  · Continue epoetin alpha 3,000 units 3 times/wk  · Monitor labs daily  · Monitor glucose levels  · Consult GI for ongoing nausea and abdominal pain      Electronically signed by HEBER Thornton CNP on 1/17/2022 at 2:01 PM

## 2022-01-17 NOTE — PROGRESS NOTES
ENDOCRINOLOGY PROGRESS NOTE      Date of admission: 1/14/2022  Date of service: 1/17/2022  Admitting physician: Maik Miller DO   Primary Care Physician: Alessia Cowan MD  Consultant physician: Familia Harkins MD     Reason for the consultation:  Uncontrolled DM,labile BG    History of Present Illness: The history is provided by the patient. Accuracy of the patient data is excellent    [address] Maldonado is a very pleasant 36 y.o. old female with PMH of Type I DM, ESRD on PD,HTN,hypothyroidism, infective endocarditis and other listed below admitted to [facility] on 1/14/2022 because of N/V, abdominal pain and found to be in DKA. Endocrine service was consulted for DM management. Subjective   Seen and examined this AM. BG at goal.   Did not receive long acting insulin overnight. Received 1U from sliding scale in the last 24 hours. Complaining of nausea and abdominal pain. States she has not eaten properly for a month. 9 episodes of diarrhea throughout the day today.       Inpatient diet:   Poor appetite, on Renal/Carb Restricted diet     Point of care glucose monitoring (Independently reviewed)   Recent Labs     01/16/22  0701 01/16/22  0702 01/16/22  1130 01/16/22  1658 01/16/22  2239 01/17/22  0242 01/17/22  0610 01/17/22  1150   GLUMET 74 72* 145* 216* 136* 131* 101* 90       Scheduled Meds:   insulin glargine  2 Units SubCUTAneous Nightly    insulin lispro  0-4 Units SubCUTAneous TID WC    pregabalin  75 mg Oral Daily    ARIPiprazole  5 mg Oral Nightly    levothyroxine  75 mcg Oral Daily    mirtazapine  15 mg Oral Nightly    pantoprazole  40 mg Oral BID AC    rOPINIRole  0.25 mg Oral Nightly    heparin (porcine)  5,000 Units SubCUTAneous Q8H    epoetin nena-epbx  3,000 Units SubCUTAneous Once per day on Mon Wed Fri    influenza virus vaccine  0.5 mL IntraMUSCular Prior to discharge     PRN Meds:   glucose, 15 g, PRN  dextrose, 12.5 g, PRN  glucagon (rDNA), 1 mg, PRN  dextrose, 100 mL/hr, PRN  potassium chloride, 40 mEq, PRN   Or  potassium alternative oral replacement, 40 mEq, PRN   Or  potassium chloride, 10 mEq, PRN  HYDROmorphone, 0.25 mg, Q8H PRN  dextrose, 12.5 g, PRN  polyethylene glycol, 17 g, Daily PRN      Continuous Infusions:   dextrose         Review of Systems  All systems reviewed. All negative except for symptoms mentioned in HPI     OBJECTIVE    BP (!) 152/94   Pulse 101   Temp 98.7 °F (37.1 °C)   Resp 20   Ht 5' 4\" (1.626 m)   Wt 142 lb 13.7 oz (64.8 kg)   SpO2 98%   BMI 24.52 kg/m²   No intake or output data in the 24 hours ending 01/17/22 1429    Physical examination:  General: awake alert, oriented x3, no abnormal position or movements. HEENT: normocephalic non-traumatic, no exophthalmos   Neck: supple  Pulm: Clear equal air entry no added sounds, no wheezing or rhonchi    Chest: Vascular access Rt. chest  CVS: S1 + S2, no murmur  Abd: soft lax, tenderness diffusely. Normal bowel sounds  Skin: warm, no lesions, no rash. Neuro: CN intact, muscle power normal  Psych: normal mood, and affect    Review of Laboratory Data:  I personally reviewed the following labs:   Recent Labs     01/14/22 2105   WBC 7.7   RBC 3.70   HGB 9.6*   HCT 29.8*   MCV 80.5   MCH 25.9*   MCHC 32.2   RDW 19.6*      MPV 11.0     Recent Labs     01/14/22  2105 01/14/22  2307 01/16/22  0300 01/16/22  1340 01/17/22  0545      < > 134 131* 131*   K 4.2   < > 2.8* 4.5 4.3   CL 90*   < > 98 97* 101   CO2 23   < > 24 25 23   BUN 13   < > 7 8 10   CREATININE 4.5*   < > 2.9* 3.3* 3.6*   GLUCOSE 400*   < > 19* 168* 106*   CALCIUM 8.8   < > 7.9* 7.9* 7.9*   PROT 6.9  --   --   --   --    LABALBU 3.4*  --   --   --   --    BILITOT 0.4  --   --   --   --    ALKPHOS 204*  --   --   --   --    AST 22  --   --   --   --    ALT 14  --   --   --   --     < > = values in this interval not displayed.      Beta-Hydroxybutyrate   Date Value Ref Range Status   01/14/2022 >4.50 (H) 0.02 - 0.27 mmol/L Final 12/31/2021 >4.50 (H) 0.02 - 0.27 mmol/L Final   11/22/2021 0.53 (H) 0.02 - 0.27 mmol/L Final     Lab Results   Component Value Date    LABA1C 8.7 12/08/2021    LABA1C 10.2 11/23/2021    LABA1C 10.6 09/28/2021     Lab Results   Component Value Date/Time    TSH 5.000 (H) 11/24/2021 08:52 AM    T4FREE 1.16 11/24/2021 08:52 AM    P8IIXWF 8.6 11/05/2015 02:20 AM    FT3 1.3 (L) 10/31/2020 10:43 AM    W1URXGU 36.73 (L) 07/20/2020 02:00 PM     Lab Results   Component Value Date    LABA1C 8.7 12/08/2021    GLUCOSE 106 01/17/2022    GLUCOSE 130 05/18/2012    MALBCR 2949.3 01/15/2020    LABMICR 1740.1 01/15/2020    LABCREA 59 01/15/2020    LABCREA 61 01/15/2020     Lab Results   Component Value Date    TRIG 82 01/14/2020    HDL 33 01/14/2020    LDLCALC 46 01/14/2020    CHOL 95 01/14/2020       Blood culture   Lab Results   Component Value Date    BC 5 Days no growth 01/01/2022    BC 5 Days no growth 11/22/2021       Radiology:  XR CHEST PORTABLE   Final Result   Suspect early bibasilar infiltrates.              Medical Records/Labs/Images review:   I personally reviewed and summarized previous records   All labs and imaging were reviewed independently     [address] Maldonado, a 36 y.o.-old female seen today for inpatient diabetes management     Diabetes Mellitus type I    · Uncontrolled very brittle DM   · Pt with DM type 1, need long acting insulin all the time to prevent DKA   · Did not receive long acting insulin overnight last night  · Will adjust  diabetes regimen to    · Will consider decrease Lantus to 1  units daily from 2U daily depending on her sugars later tonight  · Continue modified Low dose sliding scale 1:80>180 with meals and at night   · Continue glucose check with meals and at bedtime   · Will titrate insulin dose based on the blood glucose trend & insulin requirement  · We will arrange for the patient to follow with us after discharge    primary Hypothyroidism  · On levothyroxine 75 mcg daily. She was missing doses   · To recheck TFT 6-8 weeks after discharge     N/V/ Diarrhea  · C. Diff sent  · symptomatic Management per primary  service     ESRD  · Pt at risk for hypoglycemia  · On dialysis, nephrology following    The above issues were reviewed with the patient who understood and agreed with the plan. Thank you for allowing us to participate in the care of this patient. Please do not hesitate to contact us with any additional questions. Wenceslao Nieves MD  Endocrinologist, [facility] Diabetes Care and Endocrinology   [address] [place] [id number]   Phone: [phone]  Fax: [phone]  ----------------------------  An electronic signature was used to authenticate this note.  Raisa Mccain MD on 1/17/2022 at 2:29 PM

## 2022-01-17 NOTE — FLOWSHEET NOTE
01/17/22 1551   Vital Signs   BP (!) 150/88   Temp 98.9 °F (37.2 °C)   Pulse 106   Weight 142 lb 6.7 oz (64.6 kg)   Weight Method Bed scale   Percent Weight Change -0.31   Post-Hemodialysis Assessment   Post-Treatment Procedures Blood returned;Catheter capped, clamped and heparinized x 2 ports   Machine Disinfection Process Acid/Vinegar Clean;Heat Disinfect; Exterior Machine Disinfection   Rinseback Volume (ml) 600 ml   Total Liters Processed (l/min) 52 l/min   Dialyzer Clearance Lightly streaked   Duration of Treatment (minutes) 180 minutes   Hemodialysis Intake (ml) 600 ml   Hemodialysis Output (ml) 186 ml   Tolerated Treatment Good   Patient Response to Treatment pt tx ended, blood rinsed back, pt tolerated tx well not fluid removal, pt transported back to her room via bed

## 2022-01-17 NOTE — CARE COORDINATION
1/17/2022 - admitted for DKA. Endocrinology and nephrology consults done. Spoke with pt in room to discuss discharge planning. States she does not have a PCP. Her nephrologist is Dr Irene Chaney. She lives with her mother in a 2 story home with first floor set up. She does HD 5 days a week - her mom does the home dialysis with with supplies from [facility]. Hx outpatient HD at [facility]. [facility]. Hx [facility]. DME - transport wc, shower chair, bsc. To have HD today. Her plan is to discharge home when medically stable. Her family can provide transportation home. SW/CM will follow.

## 2022-01-18 LAB
ANION GAP SERPL CALCULATED.3IONS-SCNC: 6 MMOL/L (ref 7–16)
ANION GAP SERPL CALCULATED.3IONS-SCNC: 6 MMOL/L (ref 7–16)
BUN BLDV-MCNC: 6 MG/DL (ref 6–20)
BUN BLDV-MCNC: 6 MG/DL (ref 6–20)
CALCIUM SERPL-MCNC: 8.1 MG/DL (ref 8.6–10.2)
CALCIUM SERPL-MCNC: 8.2 MG/DL (ref 8.6–10.2)
CHLORIDE BLD-SCNC: 101 MMOL/L (ref 98–107)
CHLORIDE BLD-SCNC: 102 MMOL/L (ref 98–107)
CO2: 28 MMOL/L (ref 22–29)
CO2: 28 MMOL/L (ref 22–29)
CREAT SERPL-MCNC: 2.6 MG/DL (ref 0.5–1)
CREAT SERPL-MCNC: 2.6 MG/DL (ref 0.5–1)
GFR AFRICAN AMERICAN: 26
GFR AFRICAN AMERICAN: 26
GFR NON-AFRICAN AMERICAN: 26 ML/MIN/1.73
GFR NON-AFRICAN AMERICAN: 26 ML/MIN/1.73
GLUCOSE BLD-MCNC: 79 MG/DL (ref 74–99)
GLUCOSE BLD-MCNC: 79 MG/DL (ref 74–99)
GLUCOSE BLD-MCNC: 87 MG/DL (ref 74–99)
MAGNESIUM: 1.7 MG/DL (ref 1.6–2.6)
METER GLUCOSE: 113 MG/DL (ref 74–99)
METER GLUCOSE: 113 MG/DL (ref 74–99)
METER GLUCOSE: 174 MG/DL (ref 74–99)
METER GLUCOSE: 59 MG/DL (ref 74–99)
METER GLUCOSE: 71 MG/DL (ref 74–99)
METER GLUCOSE: 83 MG/DL (ref 74–99)
METER GLUCOSE: <40 MG/DL (ref 74–99)
PHOSPHORUS: 1.6 MG/DL (ref 2.5–4.5)
POTASSIUM SERPL-SCNC: 3.9 MMOL/L (ref 3.5–5)
POTASSIUM SERPL-SCNC: 4 MMOL/L (ref 3.5–5)
SODIUM BLD-SCNC: 135 MMOL/L (ref 132–146)
SODIUM BLD-SCNC: 136 MMOL/L (ref 132–146)

## 2022-01-18 PROCEDURE — 80048 BASIC METABOLIC PNL TOTAL CA: CPT

## 2022-01-18 PROCEDURE — 2500000003 HC RX 250 WO HCPCS: Performed by: INTERNAL MEDICINE

## 2022-01-18 PROCEDURE — 36556 INSERT NON-TUNNEL CV CATH: CPT

## 2022-01-18 PROCEDURE — 82947 ASSAY GLUCOSE BLOOD QUANT: CPT

## 2022-01-18 PROCEDURE — 6360000002 HC RX W HCPCS: Performed by: FAMILY MEDICINE

## 2022-01-18 PROCEDURE — 6370000000 HC RX 637 (ALT 250 FOR IP): Performed by: INTERNAL MEDICINE

## 2022-01-18 PROCEDURE — 99231 SBSQ HOSP IP/OBS SF/LOW 25: CPT | Performed by: STUDENT IN AN ORGANIZED HEALTH CARE EDUCATION/TRAINING PROGRAM

## 2022-01-18 PROCEDURE — 2580000003 HC RX 258: Performed by: INTERNAL MEDICINE

## 2022-01-18 PROCEDURE — 83735 ASSAY OF MAGNESIUM: CPT

## 2022-01-18 PROCEDURE — 6360000002 HC RX W HCPCS: Performed by: INTERNAL MEDICINE

## 2022-01-18 PROCEDURE — 82962 GLUCOSE BLOOD TEST: CPT

## 2022-01-18 PROCEDURE — 36591 DRAW BLOOD OFF VENOUS DEVICE: CPT

## 2022-01-18 PROCEDURE — 84100 ASSAY OF PHOSPHORUS: CPT

## 2022-01-18 PROCEDURE — 2500000003 HC RX 250 WO HCPCS: Performed by: FAMILY MEDICINE

## 2022-01-18 PROCEDURE — 99232 SBSQ HOSP IP/OBS MODERATE 35: CPT | Performed by: INTERNAL MEDICINE

## 2022-01-18 PROCEDURE — 2580000003 HC RX 258

## 2022-01-18 PROCEDURE — 6370000000 HC RX 637 (ALT 250 FOR IP): Performed by: STUDENT IN AN ORGANIZED HEALTH CARE EDUCATION/TRAINING PROGRAM

## 2022-01-18 PROCEDURE — 2060000000 HC ICU INTERMEDIATE R&B

## 2022-01-18 PROCEDURE — 6370000000 HC RX 637 (ALT 250 FOR IP): Performed by: FAMILY MEDICINE

## 2022-01-18 RX ORDER — SODIUM CHLORIDE 0.9 % (FLUSH) 0.9 %
SYRINGE (ML) INJECTION
Status: COMPLETED
Start: 2022-01-18 | End: 2022-01-18

## 2022-01-18 RX ORDER — METOCLOPRAMIDE 5 MG/1
5 TABLET ORAL
Status: DISCONTINUED | OUTPATIENT
Start: 2022-01-18 | End: 2022-01-28 | Stop reason: HOSPADM

## 2022-01-18 RX ORDER — INSULIN GLARGINE 100 [IU]/ML
1 INJECTION, SOLUTION SUBCUTANEOUS NIGHTLY
Status: DISCONTINUED | OUTPATIENT
Start: 2022-01-18 | End: 2022-01-19

## 2022-01-18 RX ORDER — CHOLESTYRAMINE 4 G/9G
1 POWDER, FOR SUSPENSION ORAL 2 TIMES DAILY
Status: DISCONTINUED | OUTPATIENT
Start: 2022-01-18 | End: 2022-01-28 | Stop reason: HOSPADM

## 2022-01-18 RX ADMIN — METOCLOPRAMIDE HYDROCHLORIDE 5 MG: 5 TABLET ORAL at 22:00

## 2022-01-18 RX ADMIN — METOCLOPRAMIDE HYDROCHLORIDE 5 MG: 5 TABLET ORAL at 11:05

## 2022-01-18 RX ADMIN — LEVOTHYROXINE SODIUM 75 MCG: 0.03 TABLET ORAL at 11:03

## 2022-01-18 RX ADMIN — HEPARIN SODIUM 5000 UNITS: 10000 INJECTION INTRAVENOUS; SUBCUTANEOUS at 22:15

## 2022-01-18 RX ADMIN — DEXTROSE MONOHYDRATE 12.5 G: 25 INJECTION, SOLUTION INTRAVENOUS at 08:57

## 2022-01-18 RX ADMIN — PANTOPRAZOLE SODIUM 40 MG: 40 TABLET, DELAYED RELEASE ORAL at 11:03

## 2022-01-18 RX ADMIN — METOCLOPRAMIDE HYDROCHLORIDE 5 MG: 5 TABLET ORAL at 17:00

## 2022-01-18 RX ADMIN — ARIPIPRAZOLE 5 MG: 5 TABLET ORAL at 21:58

## 2022-01-18 RX ADMIN — HYDROMORPHONE HYDROCHLORIDE 0.25 MG: 1 INJECTION, SOLUTION INTRAMUSCULAR; INTRAVENOUS; SUBCUTANEOUS at 22:06

## 2022-01-18 RX ADMIN — CHOLESTYRAMINE 4 G: 4 POWDER, FOR SUSPENSION ORAL at 22:00

## 2022-01-18 RX ADMIN — HYDROMORPHONE HYDROCHLORIDE 0.25 MG: 1 INJECTION, SOLUTION INTRAMUSCULAR; INTRAVENOUS; SUBCUTANEOUS at 17:23

## 2022-01-18 RX ADMIN — ROPINIROLE 0.25 MG: 0.25 TABLET, FILM COATED ORAL at 21:58

## 2022-01-18 RX ADMIN — HEPARIN SODIUM 5000 UNITS: 10000 INJECTION INTRAVENOUS; SUBCUTANEOUS at 06:18

## 2022-01-18 RX ADMIN — PREGABALIN 75 MG: 75 CAPSULE ORAL at 11:03

## 2022-01-18 RX ADMIN — HYDROMORPHONE HYDROCHLORIDE 0.25 MG: 1 INJECTION, SOLUTION INTRAMUSCULAR; INTRAVENOUS; SUBCUTANEOUS at 11:03

## 2022-01-18 RX ADMIN — SODIUM CHLORIDE, PRESERVATIVE FREE 10 ML: 5 INJECTION INTRAVENOUS at 11:03

## 2022-01-18 RX ADMIN — PANTOPRAZOLE SODIUM 40 MG: 40 TABLET, DELAYED RELEASE ORAL at 17:00

## 2022-01-18 RX ADMIN — SODIUM PHOSPHATE, MONOBASIC, MONOHYDRATE AND SODIUM PHOSPHATE, DIBASIC, ANHYDROUS 10 MMOL: 276; 142 INJECTION, SOLUTION INTRAVENOUS at 18:56

## 2022-01-18 RX ADMIN — INSULIN GLARGINE 1 UNITS: 100 INJECTION, SOLUTION SUBCUTANEOUS at 22:14

## 2022-01-18 RX ADMIN — DEXTROSE MONOHYDRATE 12.5 G: 25 INJECTION, SOLUTION INTRAVENOUS at 09:04

## 2022-01-18 RX ADMIN — HEPARIN SODIUM 5000 UNITS: 10000 INJECTION INTRAVENOUS; SUBCUTANEOUS at 15:06

## 2022-01-18 RX ADMIN — MIRTAZAPINE 15 MG: 15 TABLET, FILM COATED ORAL at 21:58

## 2022-01-18 ASSESSMENT — PAIN SCALES - GENERAL
PAINLEVEL_OUTOF10: 8
PAINLEVEL_OUTOF10: 0
PAINLEVEL_OUTOF10: 0

## 2022-01-18 ASSESSMENT — PAIN DESCRIPTION - LOCATION: LOCATION: ABDOMEN

## 2022-01-18 NOTE — CONSULTS
History and Physical      ASSESSMENT AND PLAN:     31y/F w/ history of poorly controlled DMI c/b ESRD on HD and gastroparesis who presents in DKA in the setting of recent infection w/ Covid.      PLAN:  1. Gastroparesis:  -I would recommend complete gastric rest for 24-48hours w/ NPO status.  -Ensure the patient gets adequate rest w/ use of medication if needed. I would suggest IV Benadryl and IV Ativan tonight to ensure sleep.   -After 24-48 hours, would slowly resume a clear liquid diet with gastroparesis portions.   -It will be important for the patient to begin a gastroparesis diet following this period of complete rest.  -Continue PPI BID.  -Okay to resume Reglan while inpatient though would be hesitant to continue on outpatient basis given Black Box Warning of tardive dyskinesia.   -Can consider adding nortriptyline 10mg qPM as well as long as it will not interact with other centrally-acting agents. 2. Loose Stools:  -Consider adding Questran BID for persistent loose stools as the patient is s/p CCY.    I will follow peripherally.      Thank you for including us in the care of this patient. Please do not hesitate to contact us with any additional questions or concerns.     Mariano Cardona MD  Gastroenterology/Hepatology  Advanced Endoscopy              HISTORY OF PRESENT ILLNESS:       Ms. John Motley is a 31y/F w/ history of uncontrolled DMI c/b ESRD on HD and gastroparesis who presents w/ abdominal pain and nausea and was found to have DKA. She does have a recent diagnosis of Covid which likely instigated the symptoms. She reports that she does have an appetite. She has also been having bowel movements and mentions that they have been loose. Her abdominal pain is constant and generalized. Her gap has closed and DKA resolved. She is being followed closely by Endocrinology.         Past Medical History:        Diagnosis Date    Acute congestive heart failure ([address]     BC (acute kidney injury) ([address] 10/01/2019    Cardiac arrest ([address] 02/15/2021    Cephalgia 10/09/2019    Chronic kidney disease     Depression     Diabetes mellitus ([address]     Diabetic gastroparesis associated with type 1 diabetes mellitus ([address] 2018    Diabetic ketoacidosis ([address] 2011    Diabetic ketoacidosis with coma associated with type 1 diabetes mellitus ([address] 2013    Diabetic polyneuropathy associated with type 1 diabetes mellitus ([address] 2020    Drug use complicating pregnancy in third trimester     Endocarditis 10/31/2020    ESRD (end stage renal disease) ([address] 2020    H/O cardiovascular stress test 2021    Lexiscan    Hemod[facility]     History of blood transfusion 2019    Hyperlipidemia 10/08/2020    Hyperosmolar hyperglycemic state (HHS) ([address] 2020    Hypothyroidism 10/08/2020    Iron deficiency anemia 10/01/2019    MDRO (multiple drug resistant organisms) resistance     MRSA (methicillin resistant Staphylococcus aureus)     back wound abcess    Non compliance w medication regimen 2016    Other disorders of kidney and ureter     Pregnancy 2016    16 weeks    Previous  delivery affecting pregnancy, antepartum 2017    Previous stillbirth or demise, antepartum 2016    Seizure ([address] 2020    Severe pre-eclampsia in third trimester 2016    Shock liver 02/15/2021    Valvular endocarditis 11/10/2020    This Diagnosis was added to the Problem List based on transcribed orders from Dr. Brad Curtis        Past Surgical History:        Procedure Laterality Date    BACK SURGERY      abscess    CATHETER REMOVAL N/A 10/1/2021    PERITONEAL CATHETER REMOVAL performed by Trevor Byers MD at [facility] 49      x2   [facility], LAPAROSCOPIC N/A 2019    CHOLECYSTECTOMY LAPAROSCOPIC performed by Deanne Rios MD at [facility] N/A 2018    COLONOSCOPY WITH BIOPSY performed by Abad Raygoza Jesus Kahn MD at [address] [facility] COLONOSCOPY N/A 12/18/2018    COLONOSCOPY WITH BIOPSY performed by Sara Santos MD at [address]  1/31/2012    EF 57%    ECHO COMPL W DOP COLOR FLOW  6/10/2013         EMBOLECTOMY N/A 11/6/2020    [address] [place] [address], [address] [facility], YULISSA -- REQS ROOM 3 performed by Braxton Goodell, MD at [address] [place] [address] Left 2/23/2021    LEFT LEG INCISION AND DRAINAGE, DEBRIDEMENT, WOUND VAC APPLICATION performed by Cleopatra Llanes DPM at [address] [place] [address] Left 5/18/2021    LEFT LEG DEBRIDEMENT BIOPSY POSS APPLICATION WOUND VAC performed by Cleopatra Llanes DPM at [facility] 20 N/A 6/26/2020    LAPAROSCOPIC INSERTION PERITONEAL DIALYSIS CATHETER performed by Libby Abarca MD at [facility] 80 ESOPHAGOGASTRODUODENOSCOPY TRANSORAL DIAGNOSTIC N/A 5/30/2018    EGD ESOPHAGOGASTRODUODENOSCOPY performed by Michelle Kiran MD at [address] [place] [address] TRANSESOPHAGEAL ECHOCARDIOGRAM N/A 10/19/2020    TRANSESOPHAGEAL ECHOCARDIOGRAM WITH BUBBLE STUDY performed by Les Andres MD at [address] [place] [address] TUNNELED VENOUS PORT PLACEMENT  04/2018    UPPER GASTROINTESTINAL ENDOSCOPY  12/18/2018    EGD BIOPSY performed by Sara Santos MD at [address] [facility] [address] 10/11/2019    EGD ESOPHAGOGASTRODUODENOSCOPY performed by Katerin Mancuso DO at [facility] N/A 7/14/2021    EGD BIOPSY performed by Sarath Burgos MD at [address] [facility] [address] History:    TOBACCO:   reports that she quit smoking about a year ago. Her smoking use included cigarettes. She has a 3.00 pack-year smoking history. She has never used smokeless tobacco.  ETOH:   reports no history of alcohol use. DRUGS:   reports current drug use. Drug: Opiates .   Family History:       Problem Relation Age of Onset    Asthma Mother     Hypertension Mother     High Blood Pressure Mother     Diabetes Mother     Asthma Brother     High Blood Pressure Father          Current Facility-Administered Medications:     metoclopramide (REGLAN) tablet 5 mg, 5 mg, Oral, 4x Daily AC & HS, Nancy Sampson MD, 5 mg at 01/18/22 1105    insulin glargine (LANTUS) injection vial 1 Units, 1 Units, SubCUTAneous, Nightly, Foreign Richmond MD    HYDROmorphone (DILAUDID) injection 0.25 mg, 0.25 mg, IntraVENous, Q4H PRN, Rodríguez Jimenez MD, 0.25 mg at 01/18/22 1103    glucose (GLUTOSE) 40 % oral gel 15 g, 15 g, Oral, PRN, Desmond Velazquez MD    dextrose 50 % IV solution, 12.5 g, IntraVENous, PRN, Desmond Velazquez MD, 12.5 g at 01/16/22 0707    glucagon (rDNA) injection 1 mg, 1 mg, IntraMUSCular, PRN, Desmond Velazquez MD    dextrose 5 % solution, 100 mL/hr, IntraVENous, PRN, Desmond Velazquez MD    potassium chloride (KLOR-CON M) extended release tablet 40 mEq, 40 mEq, Oral, PRN **OR** potassium bicarb-citric acid (EFFER-K) effervescent tablet 40 mEq, 40 mEq, Oral, PRN **OR** potassium chloride 10 mEq/100 mL IVPB (Peripheral Line), 10 mEq, IntraVENous, PRN, Stacie Tovar DO, Last Rate: 100 mL/hr at 01/16/22 1021, 10 mEq at 01/16/22 1021    insulin lispro (HUMALOG) injection vial 0-4 Units, 0-4 Units, SubCUTAneous, TID WC, Zeke Castañeda MD, 1 Units at 01/16/22 1704    pregabalin (LYRICA) capsule 75 mg, 75 mg, Oral, Daily, Rodríguez Jimenez MD, 75 mg at 01/18/22 1103    ARIPiprazole (ABILIFY) tablet 5 mg, 5 mg, Oral, Nightly, Stacie Fines DO, 5 mg at 01/16/22 2240    levothyroxine (SYNTHROID) tablet 75 mcg, 75 mcg, Oral, Daily, Stacie Fines DO, 75 mcg at 01/18/22 1103    mirtazapine (REMERON) tablet 15 mg, 15 mg, Oral, Nightly, Stacie Fines, DO, 15 mg at 01/16/22 2241    pantoprazole (PROTONIX) tablet 40 mg, 40 mg, Oral, BID AC, Stacie Fines, DO, 40 mg at 01/18/22 1103    rOPINIRole (REQUIP) tablet 0.25 mg, 0.25 mg, Oral, Nightly, Stacie Fines, DO, 0.25 mg at 01/16/22 2240    dextrose 50 % IV solution, 12.5 g, IntraVENous, PRN, Rosario Peeling, DO, 12.5 g at 01/18/22 0904    polyethylene glycol (GLYCOLAX) packet 17 g, 17 g, Oral, Daily PRN, Rosario Peeling, DO    heparin (porcine) injection 5,000 Units, 5,000 Units, SubCUTAneous, Q8H, Rosario Peeling, DO, 5,000 Units at 01/18/22 0618    epoetin nena-epbx (RETACRIT) injection 3,000 Units, 3,000 Units, SubCUTAneous, Once per day on Mon Wed Fri, Lashanda Gibson, APRN - CNP, 3,000 Units at 01/17/22 8050    influenza quadrivalent split vaccine (FLUZONE;FLUARIX;FLULAVAL;AFLURIA) injection 0.5 mL, 0.5 mL, IntraMUSCular, Prior to discharge, Rosario Peeling, DO     Allergies:  Cefepime and Toradol [ketorolac tromethamine]    ROS:  General: Patient denies f/c or weight loss. HEENT: Patient denies persistent postnasal drip, scleral icterus, drooling, persistent bleeding from nose/mouth. Resp: Patient denies SOB, wheezing, productive cough. Cards: Patient denies CP, palpitations, significant edema  GI: As above. Derm: Patient denies jaundice/rashes. Musc: Patient denies diffuse/irregular joint swelling or myalgias. PHYSICAL EXAM:  BP (!) 137/93   Pulse 103   Temp 98.3 °F (36.8 °C) (Oral)   Resp 14   Ht 5' 4\" (1.626 m)   Wt 142 lb 6.7 oz (64.6 kg)   SpO2 95%   BMI 24.45 kg/m²   Physical Exam:  General: Overall well-appearing, NAD  HEENT: PERRLA, EOMI, Anicteric sclera, MMM, no rhinorrhea  Cards: RRR, no LE edema  Resp: Breathing comfortably on room air, good air movement, no use of accessory muscles, no audible wheezing  Abdomen: soft, NT, ND. Extremities: Moves all extremities, no effusions or bruising.   Skin: No rashes or jaundice  Neuro: A&O x 3, CN grossly intact, non-focal exam            Electronically signed by Marisol Dela Cruz MD on 1/18/2022 at 2:31 PM

## 2022-01-18 NOTE — PROGRESS NOTES
Patient found unresponsive upon entering room this morning. POCT blood sugar RR Lo. Dextrose 50% given IV push. Patient returned to normal mentation within minutes. Patient is difficult to obtain labs. Dr. Guillermo Ware contacted and order obtained to access venous port of dialysis catheter and maintained with KVO infusion.

## 2022-01-18 NOTE — PROGRESS NOTES
Hospitalist Progress Note      SYNOPSIS: Patient admitted on 2022 for DKA. Pt with ESRD    Ms. James Gama, a 36y.o. year old female  who  has a past medical history of Acute congestive heart failure, ESRD on Dialysis, Cardiac arrest, Depression, Diabetes mellitus, Diabetic gastroparesis associated with type 1 diabetes mellitus, Diabetic ketoacidosis with coma associated with type 1 diabetes mellitus, Diabetic polyneuropathy, Endocarditis, Hyperlipidemia, Hyperosmolar hyperglycemic state, Hypothyroidism, Iron deficiency anemia, multiple drug resistant organisms resistance, MRSA, Non compliance w medication regimen, Previous stillbirth or demise, antepartum, Seizure Severe pre-eclampsia in third trimester, Shock liver, and Valvular endocarditis.      Patient presented to the emergency with complaints of nausea and vomiting. She has vomited 7 times on day of admission. Not able to keep anything down. She  Has a history of recent COVID diagnosis. Laboratory studies reveal potassium 3.2, creatinine 4.0, anion gap 18, glucose 327, calcium 7.4, troponin 162, alk phos 204, beta hydroxybutyrate >4.50, hemoglobin 9.6. COVID-19 negative. pH 7.31. Patient was started on IV fluids and insulin infusion for DKA. Nephrology and Endocrinology were consulted. Pt underwent HD as well. Gap closed. Pt continues to have intermittent nausea    SUBJECTIVE:    Wants to eat  Still with abdominal pain/nausea  Had an episode of hypoglycemia again this am <40    Temp (24hrs), Av.4 °F (36.9 °C), Min:97.8 °F (36.6 °C), Max:98.9 °F (37.2 °C)    DIET: ADULT DIET;  Clear Liquid; 4 carb choices (60 gm/meal)  CODE: Full Code    Intake/Output Summary (Last 24 hours) at 2022 1040  Last data filed at 2022 0619  Gross per 24 hour   Intake 610 ml   Output 186 ml   Net 424 ml       OBJECTIVE:    BP (!) 137/93   Pulse 103   Temp 97.8 °F (36.6 °C) (Oral)   Resp 16   Ht 5' 4\" (1.626 m)   Wt 142 lb 6.7 oz (64.6 kg) 72 hours. Recent Labs     01/16/22  0300 01/16/22  1340 01/17/22  0545    131* 131*   K 2.8* 4.5 4.3   CL 98 97* 101   CO2 24 25 23   BUN 7 8 10   CREATININE 2.9* 3.3* 3.6*   CALCIUM 7.9* 7.9* 7.9*   PHOS  --  1.4* 1.3*       No results for input(s): PROT, ALB, ALKPHOS, ALT, AST, BILITOT, AMYLASE, LIPASE in the last 72 hours. No results for input(s): INR in the last 72 hours. No results for input(s): Hermelinda Divine in the last 72 hours. Chronic labs:    Lab Results   Component Value Date    CHOL 95 01/14/2020    TRIG 82 01/14/2020    HDL 33 01/14/2020    LDLCALC 46 01/14/2020    TSH 5.000 (H) 11/24/2021    INR 1.1 12/08/2021    LABA1C 8.7 (H) 12/08/2021       Radiology: REVIEWED DAILY    +++++++++++++++++++++++++++++++++++++++++++++++++  Miguel Moreland MD  [address]  +++++++++++++++++++++++++++++++++++++++++++++++++  NOTE: This report was transcribed using voice recognition software. Every effort was made to ensure accuracy; however, inadvertent computerized transcription errors may be present.

## 2022-01-18 NOTE — PROGRESS NOTES
Or  potassium alternative oral replacement, 40 mEq, PRN   Or  potassium chloride, 10 mEq, PRN  dextrose, 12.5 g, PRN  polyethylene glycol, 17 g, Daily PRN      Continuous Infusions:   dextrose         Review of Systems  All systems reviewed. All negative except for symptoms mentioned in HPI     OBJECTIVE    BP (!) 137/93   Pulse 103   Temp 98.3 °F (36.8 °C) (Oral)   Resp 14   Ht 5' 4\" (1.626 m)   Wt 142 lb 6.7 oz (64.6 kg)   SpO2 95%   BMI 24.45 kg/m²     Intake/Output Summary (Last 24 hours) at 1/18/2022 1158  Last data filed at 1/18/2022 0619  Gross per 24 hour   Intake 610 ml   Output 186 ml   Net 424 ml       Physical examination:   General: awake alert, oriented x3, no abnormal position or movements. HEENT: normocephalic non-traumatic, no exophthalmos   Neck: supple  Pulm: Clear equal air entry no added sounds, no wheezing or rhonchi    Chest: Vascular access Rt. Chest. Lt chest scar. CVS: S1 + S2, no murmur  Abd: soft lax, tenderness diffusely. Normal bowel sounds  Skin: warm, no lesions, no rash. Neuro: CN intact, muscle power normal  Psych: normal mood, and affect    Review of Laboratory Data:  I personally reviewed the following labs:   No results for input(s): WBC, RBC, HGB, HCT, MCV, MCH, MCHC, RDW, PLT, MPV in the last 72 hours.   Recent Labs     01/16/22  1340 01/17/22  0545 01/18/22  0938   * 131* 136  135   K 4.5 4.3 4.0  3.9   CL 97* 101 102  101   CO2 25 23 28  28   BUN 8 10 6  6   CREATININE 3.3* 3.6* 2.6*  2.6*   GLUCOSE 168* 106* 79  79  87   CALCIUM 7.9* 7.9* 8.2*  8.1*     Beta-Hydroxybutyrate   Date Value Ref Range Status   01/14/2022 >4.50 (H) 0.02 - 0.27 mmol/L Final   12/31/2021 >4.50 (H) 0.02 - 0.27 mmol/L Final   11/22/2021 0.53 (H) 0.02 - 0.27 mmol/L Final     Lab Results   Component Value Date    LABA1C 8.7 12/08/2021    LABA1C 10.2 11/23/2021    LABA1C 10.6 09/28/2021     Lab Results   Component Value Date/Time    TSH 5.000 (H) 11/24/2021 08:52 AM    T4FREE 1.16 11/24/2021 08:52 AM    R1CBIRJ 8.6 11/05/2015 02:20 AM    FT3 1.3 (L) 10/31/2020 10:43 AM    E0VXTRR 36.73 (L) 07/20/2020 02:00 PM     Lab Results   Component Value Date    LABA1C 8.7 12/08/2021    GLUCOSE 79 01/18/2022    GLUCOSE 79 01/18/2022    GLUCOSE 87 01/18/2022    GLUCOSE 130 05/18/2012    MALBCR 2949.3 01/15/2020    LABMICR 1740.1 01/15/2020    LABCREA 59 01/15/2020    LABCREA 61 01/15/2020     Lab Results   Component Value Date    TRIG 82 01/14/2020    HDL 33 01/14/2020    LDLCALC 46 01/14/2020    CHOL 95 01/14/2020       Blood culture   Lab Results   Component Value Date    BC 5 Days no growth 01/01/2022    BC 5 Days no growth 11/22/2021       Radiology:  XR CHEST PORTABLE   Final Result   Suspect early bibasilar infiltrates. Medical Records/Labs/Images review:   I personally reviewed and summarized previous records   All labs and imaging were reviewed independently     [address]donado, a 36 y.o.-old female seen today for inpatient diabetes management     Diabetes Mellitus type I    · Extremely insulin sensitive   · Pt with DM type 1, need long acting insulin all the time to prevent DKA   · Will adjust  diabetes regimen to    · Lantus to 1 (One) unit BID  · Continue modified Low dose sliding scale 1:80>180 with meals and at night   · Continue glucose check with meals and at bedtime   · Will titrate insulin dose based on the blood glucose trend & insulin requirement  · We will arrange for the patient to follow with us after discharge    primary Hypothyroidism  · On levothyroxine 75 mcg daily. She was missing doses   · To recheck TFT 6-8 weeks after discharge     N/V/ Diarrhea  · C. Diff negative and diarrhea has resolved  · symptomatic Management per primary and GI services    ESRD  · Pt at risk for hypoglycemia  · On dialysis, nephrology following    The above issues were reviewed with the patient who understood and agreed with the plan.     Thank you for allowing us to participate in the care of this patient. Please do not hesitate to contact us with any additional questions. She Muñoz MD  Endocrinologist, [facility] Diabetes Care and Endocrinology   [address] [place] [address]   Phone: [phone]  Fax: [phone]  ----------------------------  An electronic signature was used to authenticate this note.  Becca Tovar MD on 1/18/2022 at 11:58 AM

## 2022-01-18 NOTE — PROGRESS NOTES
Dr. Cleopatra Curiel and associates contacted regarding consult placed by Dr. Aleksandra Duff. That surgical group is questioning a consult to them since the patient has been seen numerous times for the same complaint by both general surgery and GI previously. Dr. Catherine Menard (GI) was consulted yesterday, Monday, January 17, 2022 and had ordered patient to be NPO with plans of seeing this patient today. Dr. Cleopatra Curiel wishes to defer back to Dr. Catherine Menard. Dr. Aleksandra Duff notified through [facility].

## 2022-01-18 NOTE — PROGRESS NOTES
Department of Internal Medicine  Nephrology Progress Note    Events Reviewed. Subjective: We are following Ms. Khadijah Buckner for ESRD on HD. Seen on dialysis, tolerating well. Complaining of ongoing nausea and abdominal pain. PHYSICAL EXAM:      Vitals:    VITALS:  /83   Pulse 105   Temp 98.2 °F (36.8 °C) (Oral)   Resp 16   Ht 5' 4\" (1.626 m)   Wt 142 lb 6.7 oz (64.6 kg)   SpO2 97%   BMI 24.45 kg/m²   24HR INTAKE/OUTPUT:      Intake/Output Summary (Last 24 hours) at 1/18/2022 5466  Last data filed at 1/18/2022 6364  Gross per 24 hour   Intake 0 ml   Output --   Net 0 ml       Access: RIJ tunneled dialysis catheter  Constitutional:  Lethargic, but wakens to voice  HEENT:  PERRL, normocephalic, atraumatic  Respiratory:  CTA bilateral   Cardiovascular/Edema:  ST, RRR, no murmur gallop or rub  Gastrointestinal:  abd distended, c/o persistent abdominal pain  Neurologic:  Lethargic, awakens to voice, follows commands, no focal deficit  Skin:  Warm, dry, ecchymotic areas to abdomen    Scheduled Meds:   metoclopramide  5 mg Oral 4x Daily AC & HS    insulin glargine  1 Units SubCUTAneous Nightly    cholestyramine  1 packet Oral BID    insulin lispro  0-4 Units SubCUTAneous TID WC    pregabalin  75 mg Oral Daily    ARIPiprazole  5 mg Oral Nightly    levothyroxine  75 mcg Oral Daily    mirtazapine  15 mg Oral Nightly    pantoprazole  40 mg Oral BID AC    rOPINIRole  0.25 mg Oral Nightly    heparin (porcine)  5,000 Units SubCUTAneous Q8H    epoetin nena-epbx  3,000 Units SubCUTAneous Once per day on Mon Wed Fri    influenza virus vaccine  0.5 mL IntraMUSCular Prior to discharge     Continuous Infusions:   dextrose       PRN Meds:. HYDROmorphone, glucose, dextrose, glucagon (rDNA), dextrose, potassium chloride **OR** potassium alternative oral replacement **OR** potassium chloride, dextrose, polyethylene glycol    DATA:    CBC with Differential:    Lab Results   Component Value Date    WBC 7.7 01/14/2022    RBC 3.70 01/14/2022    HGB 9.6 01/14/2022    HCT 29.8 01/14/2022     01/14/2022    MCV 80.5 01/14/2022    MCH 25.9 01/14/2022    MCHC 32.2 01/14/2022    RDW 19.6 01/14/2022    NRBC 0.9 08/10/2020    SEGSPCT 67 06/27/2013    METASPCT 1.7 08/10/2020    LYMPHOPCT 20.7 01/14/2022    MONOPCT 3.3 01/14/2022    BASOPCT 0.4 01/14/2022    MONOSABS 0.25 01/14/2022    LYMPHSABS 1.59 01/14/2022    EOSABS 0.05 01/14/2022    BASOSABS 0.03 01/14/2022     CMP:    Lab Results   Component Value Date     01/18/2022     01/18/2022    K 3.9 01/18/2022    K 4.0 01/18/2022    K 4.2 11/24/2021     01/18/2022     01/18/2022    CO2 28 01/18/2022    CO2 28 01/18/2022    BUN 6 01/18/2022    BUN 6 01/18/2022    CREATININE 2.6 01/18/2022    CREATININE 2.6 01/18/2022    GFRAA 26 01/18/2022    GFRAA 26 01/18/2022    LABGLOM 26 01/18/2022    LABGLOM 26 01/18/2022    GLUCOSE 79 01/18/2022    GLUCOSE 79 01/18/2022    GLUCOSE 87 01/18/2022    GLUCOSE 130 05/18/2012    PROT 6.9 01/14/2022    LABALBU 3.4 01/14/2022    LABALBU 4.1 05/18/2012    CALCIUM 8.1 01/18/2022    CALCIUM 8.2 01/18/2022    BILITOT 0.4 01/14/2022    ALKPHOS 204 01/14/2022    AST 22 01/14/2022    ALT 14 01/14/2022     Magnesium:    Lab Results   Component Value Date    MG 1.7 01/18/2022     Phosphorus:    Lab Results   Component Value Date    PHOS 1.6 01/18/2022     Radiology Review:                Chest Xray 1/14/22   Suspect early bibasilar infiltrates. BRIEF SUMMARY OF INITIAL CONSULT:     Briefly, Miss Mickie Cockayne is a 36year-old female with a PMH of ESRD on home hemodialysis 5 days/wk, (initiated September 2021, Type I DM, poorly controlled with recurrent admissions for DKA, HTN, gastroparesis, ascites, infective endocarditis, cardiac arrest, hypothyroidism and depression. She was recently admitted earlier this month after testing positive for COVID 19.  She presented to the ER on January 14th, 2022 with complaints of nausea and vomiting for one month and hyperglycemia. She states she has not been compliant with insulin therapy recently. In the ER she was found to be in DKA with blood sugar of 400 And beta hydroxy greater than 4.5. Other pertinent lab work revealed sodium 132, chloride 92, anion gap 19, BUN 13, Creatinine 4.5, lactic acid 2.3. She was started on DKA protocol and IVFs in the ER. Renal is consulted for ESRD on HD and DKA. Problems Resolved:   · DKA,  beta hydroxy greater than 4.5. S/P insulin drip. Endocrinology following   · Hyponatremia, 2/2 decreased free water excretion from ESRD. 1L fluid restriction. · Hypokalemia, 2/2 GI losses from vomiting and poor oral intake  · Hypocalcemia, to obtain Vitamin D level. To Replace    IMPRESSION/RECOMMENDATIONS:       1. ESRD on home hemodialysis 4 days/wk (per patient) via RIJ tunneled dialysis catheter. States last HD treatment was Thursday. HD initiated September 2021 after failed peritoneal dialysis with persistent hyperkalemia. To continue HD MWF while inpatient. 2. Hyponatremia, 2/2 decreased free water excretion from ESRD. 1L fluid restriction. Sodium levels improved. 3. HTN, on lopressor and amlodipine at home  4. Hypophosphatemia, 2/2 poor intake  5. Vitamin D deficiency, on ergocalciferol at home  6. 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 Anemia of CKD,  Hgb 9.6 mg/dL today, start epoetin alpha 3,000 unit 3 times/wk  8. Type I DM, poorly controlled with frequent readmissions for DKA  ------------------------------------------------------  9. Hx Covid 19, unvaccinated  10. Restless leg syndrome, on ropinirole  11. Depression, on Abilify  12. Hypothyroidism, on levothyroxine  13.  History of gastroparesis, on metoclopramide, appreciated GI consult.      Plan:     · HD three times/ wk M-W-F while inpatient   · Continue epoetin alpha 3,000 units 3 times/wk  · Replace phosphorus        Electronically signed by Jeffrey Valdes MD on 1/18/2022 at 5:58 PM

## 2022-01-18 NOTE — PROGRESS NOTES
Dr. Marylene Hoit of gen surg based on pt evaluation is deferring to GI for plan of care for pt, they are already on board and see pt outpatient as well so there isn't a duel need for gen surg team as well.

## 2022-01-19 LAB
ABO/RH: NORMAL
ABO/RH: NORMAL
ANION GAP SERPL CALCULATED.3IONS-SCNC: 8 MMOL/L (ref 7–16)
ANISOCYTOSIS: ABNORMAL
ANTIBODY SCREEN: NORMAL
BASOPHILS ABSOLUTE: 0.03 E9/L (ref 0–0.2)
BASOPHILS RELATIVE PERCENT: 0.9 % (ref 0–2)
BLOOD BANK DISPENSE STATUS: NORMAL
BLOOD BANK PRODUCT CODE: NORMAL
BPU ID: NORMAL
BUN BLDV-MCNC: 9 MG/DL (ref 6–20)
BURR CELLS: ABNORMAL
CALCIUM SERPL-MCNC: 7.9 MG/DL (ref 8.6–10.2)
CHLORIDE BLD-SCNC: 99 MMOL/L (ref 98–107)
CO2: 26 MMOL/L (ref 22–29)
CREAT SERPL-MCNC: 3.3 MG/DL (ref 0.5–1)
DESCRIPTION BLOOD BANK: NORMAL
EOSINOPHILS ABSOLUTE: 0 E9/L (ref 0.05–0.5)
EOSINOPHILS RELATIVE PERCENT: 1.1 % (ref 0–6)
GFR AFRICAN AMERICAN: 20
GFR NON-AFRICAN AMERICAN: 20 ML/MIN/1.73
GLUCOSE BLD-MCNC: 243 MG/DL (ref 74–99)
HCT VFR BLD CALC: 19.7 % (ref 34–48)
HCT VFR BLD CALC: 23.9 % (ref 34–48)
HEMOGLOBIN: 6.2 G/DL (ref 11.5–15.5)
HEMOGLOBIN: 7.8 G/DL (ref 11.5–15.5)
HYPOCHROMIA: ABNORMAL
LYMPHOCYTES ABSOLUTE: 1.25 E9/L (ref 1.5–4)
LYMPHOCYTES RELATIVE PERCENT: 33 % (ref 20–42)
MAGNESIUM: 1.7 MG/DL (ref 1.6–2.6)
MCH RBC QN AUTO: 25.3 PG (ref 26–35)
MCHC RBC AUTO-ENTMCNC: 31.5 % (ref 32–34.5)
MCV RBC AUTO: 80.4 FL (ref 80–99.9)
METER GLUCOSE: 129 MG/DL (ref 74–99)
METER GLUCOSE: 235 MG/DL (ref 74–99)
METER GLUCOSE: 260 MG/DL (ref 74–99)
METER GLUCOSE: 56 MG/DL (ref 74–99)
METER GLUCOSE: 67 MG/DL (ref 74–99)
METER GLUCOSE: 85 MG/DL (ref 74–99)
METER GLUCOSE: 96 MG/DL (ref 74–99)
MONOCYTES ABSOLUTE: 0.08 E9/L (ref 0.1–0.95)
MONOCYTES RELATIVE PERCENT: 1.7 % (ref 2–12)
MYELOCYTE PERCENT: 0.9 % (ref 0–0)
NEUTROPHILS ABSOLUTE: 2.43 E9/L (ref 1.8–7.3)
NEUTROPHILS RELATIVE PERCENT: 63.5 % (ref 43–80)
PDW BLD-RTO: 20.6 FL (ref 11.5–15)
PHOSPHORUS: 2.2 MG/DL (ref 2.5–4.5)
PLATELET # BLD: 89 E9/L (ref 130–450)
PLATELET CONFIRMATION: NORMAL
PMV BLD AUTO: 10.9 FL (ref 7–12)
POIKILOCYTES: ABNORMAL
POLYCHROMASIA: ABNORMAL
POTASSIUM SERPL-SCNC: 4.6 MMOL/L (ref 3.5–5)
RBC # BLD: 2.45 E12/L (ref 3.5–5.5)
SODIUM BLD-SCNC: 133 MMOL/L (ref 132–146)
TARGET CELLS: ABNORMAL
WBC # BLD: 3.8 E9/L (ref 4.5–11.5)

## 2022-01-19 PROCEDURE — 36415 COLL VENOUS BLD VENIPUNCTURE: CPT

## 2022-01-19 PROCEDURE — 6370000000 HC RX 637 (ALT 250 FOR IP): Performed by: STUDENT IN AN ORGANIZED HEALTH CARE EDUCATION/TRAINING PROGRAM

## 2022-01-19 PROCEDURE — 2580000003 HC RX 258: Performed by: NURSE PRACTITIONER

## 2022-01-19 PROCEDURE — 85025 COMPLETE CBC W/AUTO DIFF WBC: CPT

## 2022-01-19 PROCEDURE — 6360000002 HC RX W HCPCS: Performed by: FAMILY MEDICINE

## 2022-01-19 PROCEDURE — 36556 INSERT NON-TUNNEL CV CATH: CPT

## 2022-01-19 PROCEDURE — 82962 GLUCOSE BLOOD TEST: CPT

## 2022-01-19 PROCEDURE — 85018 HEMOGLOBIN: CPT

## 2022-01-19 PROCEDURE — 6360000002 HC RX W HCPCS: Performed by: INTERNAL MEDICINE

## 2022-01-19 PROCEDURE — 84100 ASSAY OF PHOSPHORUS: CPT

## 2022-01-19 PROCEDURE — 2060000000 HC ICU INTERMEDIATE R&B

## 2022-01-19 PROCEDURE — 86850 RBC ANTIBODY SCREEN: CPT

## 2022-01-19 PROCEDURE — 6360000002 HC RX W HCPCS: Performed by: NURSE PRACTITIONER

## 2022-01-19 PROCEDURE — 83735 ASSAY OF MAGNESIUM: CPT

## 2022-01-19 PROCEDURE — P9016 RBC LEUKOCYTES REDUCED: HCPCS

## 2022-01-19 PROCEDURE — 6370000000 HC RX 637 (ALT 250 FOR IP): Performed by: INTERNAL MEDICINE

## 2022-01-19 PROCEDURE — 80048 BASIC METABOLIC PNL TOTAL CA: CPT

## 2022-01-19 PROCEDURE — 86900 BLOOD TYPING SEROLOGIC ABO: CPT

## 2022-01-19 PROCEDURE — 86923 COMPATIBILITY TEST ELECTRIC: CPT

## 2022-01-19 PROCEDURE — 90935 HEMODIALYSIS ONE EVALUATION: CPT | Performed by: INTERNAL MEDICINE

## 2022-01-19 PROCEDURE — 6370000000 HC RX 637 (ALT 250 FOR IP): Performed by: FAMILY MEDICINE

## 2022-01-19 PROCEDURE — 99232 SBSQ HOSP IP/OBS MODERATE 35: CPT | Performed by: INTERNAL MEDICINE

## 2022-01-19 PROCEDURE — 85014 HEMATOCRIT: CPT

## 2022-01-19 PROCEDURE — 86901 BLOOD TYPING SEROLOGIC RH(D): CPT

## 2022-01-19 PROCEDURE — 2500000003 HC RX 250 WO HCPCS: Performed by: NURSE PRACTITIONER

## 2022-01-19 RX ORDER — INSULIN GLARGINE 100 [IU]/ML
1 INJECTION, SOLUTION SUBCUTANEOUS NIGHTLY
Status: DISCONTINUED | OUTPATIENT
Start: 2022-01-19 | End: 2022-01-19

## 2022-01-19 RX ORDER — INSULIN GLARGINE 100 [IU]/ML
1 INJECTION, SOLUTION SUBCUTANEOUS 2 TIMES DAILY
Status: DISCONTINUED | OUTPATIENT
Start: 2022-01-19 | End: 2022-01-19

## 2022-01-19 RX ORDER — INSULIN GLARGINE 100 [IU]/ML
1 INJECTION, SOLUTION SUBCUTANEOUS EVERY MORNING
Status: DISCONTINUED | OUTPATIENT
Start: 2022-01-20 | End: 2022-01-21

## 2022-01-19 RX ADMIN — METOCLOPRAMIDE HYDROCHLORIDE 5 MG: 5 TABLET ORAL at 20:59

## 2022-01-19 RX ADMIN — ROPINIROLE 0.25 MG: 0.25 TABLET, FILM COATED ORAL at 20:59

## 2022-01-19 RX ADMIN — HYDROMORPHONE HYDROCHLORIDE 0.25 MG: 1 INJECTION, SOLUTION INTRAMUSCULAR; INTRAVENOUS; SUBCUTANEOUS at 11:45

## 2022-01-19 RX ADMIN — METOCLOPRAMIDE HYDROCHLORIDE 5 MG: 5 TABLET ORAL at 11:45

## 2022-01-19 RX ADMIN — ARIPIPRAZOLE 5 MG: 5 TABLET ORAL at 20:59

## 2022-01-19 RX ADMIN — PANTOPRAZOLE SODIUM 40 MG: 40 TABLET, DELAYED RELEASE ORAL at 05:05

## 2022-01-19 RX ADMIN — MIRTAZAPINE 15 MG: 15 TABLET, FILM COATED ORAL at 20:59

## 2022-01-19 RX ADMIN — EPOETIN ALFA-EPBX 3000 UNITS: 3000 INJECTION, SOLUTION INTRAVENOUS; SUBCUTANEOUS at 11:00

## 2022-01-19 RX ADMIN — PREGABALIN 75 MG: 75 CAPSULE ORAL at 11:01

## 2022-01-19 RX ADMIN — HEPARIN SODIUM 5000 UNITS: 10000 INJECTION INTRAVENOUS; SUBCUTANEOUS at 14:30

## 2022-01-19 RX ADMIN — HEPARIN SODIUM 5000 UNITS: 10000 INJECTION INTRAVENOUS; SUBCUTANEOUS at 05:06

## 2022-01-19 RX ADMIN — HYDROMORPHONE HYDROCHLORIDE 0.25 MG: 1 INJECTION, SOLUTION INTRAMUSCULAR; INTRAVENOUS; SUBCUTANEOUS at 07:22

## 2022-01-19 RX ADMIN — PANTOPRAZOLE SODIUM 40 MG: 40 TABLET, DELAYED RELEASE ORAL at 17:00

## 2022-01-19 RX ADMIN — HYDROMORPHONE HYDROCHLORIDE 0.25 MG: 1 INJECTION, SOLUTION INTRAMUSCULAR; INTRAVENOUS; SUBCUTANEOUS at 03:18

## 2022-01-19 RX ADMIN — CHOLESTYRAMINE 4 G: 4 POWDER, FOR SUSPENSION ORAL at 21:03

## 2022-01-19 RX ADMIN — HEPARIN SODIUM 5000 UNITS: 10000 INJECTION INTRAVENOUS; SUBCUTANEOUS at 21:11

## 2022-01-19 RX ADMIN — SODIUM PHOSPHATE, MONOBASIC, MONOHYDRATE AND SODIUM PHOSPHATE, DIBASIC, ANHYDROUS 10 MMOL: 276; 142 INJECTION, SOLUTION INTRAVENOUS at 13:30

## 2022-01-19 RX ADMIN — METOCLOPRAMIDE HYDROCHLORIDE 5 MG: 5 TABLET ORAL at 06:20

## 2022-01-19 RX ADMIN — CHOLESTYRAMINE 4 G: 4 POWDER, FOR SUSPENSION ORAL at 11:01

## 2022-01-19 RX ADMIN — HYDROMORPHONE HYDROCHLORIDE 0.25 MG: 1 INJECTION, SOLUTION INTRAMUSCULAR; INTRAVENOUS; SUBCUTANEOUS at 20:59

## 2022-01-19 RX ADMIN — LEVOTHYROXINE SODIUM 75 MCG: 0.03 TABLET ORAL at 05:05

## 2022-01-19 RX ADMIN — METOCLOPRAMIDE HYDROCHLORIDE 5 MG: 5 TABLET ORAL at 17:00

## 2022-01-19 ASSESSMENT — PAIN SCALES - GENERAL
PAINLEVEL_OUTOF10: 0
PAINLEVEL_OUTOF10: 8
PAINLEVEL_OUTOF10: 8
PAINLEVEL_OUTOF10: 7
PAINLEVEL_OUTOF10: 8
PAINLEVEL_OUTOF10: 0
PAINLEVEL_OUTOF10: 7

## 2022-01-19 ASSESSMENT — PAIN DESCRIPTION - PAIN TYPE
TYPE: CHRONIC PAIN
TYPE: CHRONIC PAIN

## 2022-01-19 ASSESSMENT — PAIN DESCRIPTION - LOCATION
LOCATION: ABDOMEN
LOCATION: GENERALIZED

## 2022-01-19 NOTE — PROGRESS NOTES
Spoke with dialysis about stat cbc and type and screen orders and they stated they already ruthie those at this time.

## 2022-01-19 NOTE — CARE COORDINATION
1/19/2022 - Nephrology following. GI consult done. NPO. HD done today. Hgb 6.2 today - received 1 unit PRBC today with HD. Pt plans on discharging home when medically stable. She will continue home HD  - her mother assists her with her HD at home. Her family can provide transportation home. SW/CM will follow.

## 2022-01-19 NOTE — PROGRESS NOTES
Blood bank called to say blood is ready. Pt is still in dialysis, spoke w/ dialysis to see if they can administer blood and they can. Blood bank is to send tube to dialysis.

## 2022-01-19 NOTE — PROGRESS NOTES
Blood was given by dialysis. No suspected reaction noted. Post h and h collected and sent to lab. Pt abdomen appears less distended than yesterday complains of no pain w/ palpitation. Says abdominal pain and nausea has subsided and she has her appetite back. Message send to Dr. Pedrito Hilario to see if diet can be resumed. He said to start her back on clears for the rest of today. If no recurred emesis may advance as tolerated tomorrow.

## 2022-01-19 NOTE — PROGRESS NOTES
ENDOCRINOLOGY PROGRESS NOTE      Date of admission: 1/14/2022  Date of service: 1/19/2022  Admitting physician: Katie Meyers DO   Primary Care Physician: Hiro Hopson MD  Consultant physician: Cristina Simmons MD     Reason for the consultation:  Uncontrolled DM,labile BG    History of Present Illness: The history is provided by the patient. Accuracy of the patient data is excellent    [address] Maldonado is a very pleasant 36 y.o. old female with PMH of Type I DM, ESRD on PD,HTN,hypothyroidism, infective endocarditis and other listed below admitted to [facility] on 1/14/2022 because of N/V, abdominal pain and found to be in DKA. Endocrine service was consulted for DM management. Subjective   Seen and examined this AM.   Very labile blood sugars  Received 1U Lantus but no sliding scale in the last 24 hours    No longer complaining of nausea, abdominal pain or diarrhea.   States she is hungry     Inpatient diet:   Poor appetite, NPO    Point of care glucose monitoring (Independently reviewed)   Recent Labs     01/18/22  1107 01/18/22  1151 01/18/22  1315 01/18/22  1709 01/18/22 2027 01/19/22  0059 01/19/22  0553 01/19/22  1100   GLUMET 59* 83 113* 113* 174* 260* 235* 96       Scheduled Meds:   [START ON 1/21/2022] epoetin nena-epbx  2,000 Units SubCUTAneous Once per day on Mon Wed Fri    And    [START ON 1/21/2022] epoetin nena-epbx  3,000 Units SubCUTAneous Once per day on Mon Wed Fri    insulin glargine  1 Units SubCUTAneous Nightly    sodium phosphate IVPB  10 mmol IntraVENous Once    metoclopramide  5 mg Oral 4x Daily AC & HS    cholestyramine  1 packet Oral BID    insulin lispro  0-4 Units SubCUTAneous TID WC    pregabalin  75 mg Oral Daily    ARIPiprazole  5 mg Oral Nightly    levothyroxine  75 mcg Oral Daily    mirtazapine  15 mg Oral Nightly    pantoprazole  40 mg Oral BID AC    rOPINIRole  0.25 mg Oral Nightly    heparin (porcine)  5,000 Units SubCUTAneous Q8H    influenza virus vaccine  0.5 mL IntraMUSCular Prior to discharge     PRN Meds:   HYDROmorphone, 0.25 mg, Q4H PRN  glucose, 15 g, PRN  dextrose, 12.5 g, PRN  glucagon (rDNA), 1 mg, PRN  dextrose, 100 mL/hr, PRN  potassium chloride, 40 mEq, PRN   Or  potassium alternative oral replacement, 40 mEq, PRN   Or  potassium chloride, 10 mEq, PRN  dextrose, 12.5 g, PRN  polyethylene glycol, 17 g, Daily PRN      Continuous Infusions:   dextrose         Review of Systems  All systems reviewed. All negative except for symptoms mentioned in HPI     OBJECTIVE    BP (!) 140/88   Pulse 113   Temp 97.9 °F (36.6 °C) (Oral)   Resp 17   Ht 5' 4\" (1.626 m)   Wt 139 lb 5.3 oz (63.2 kg)   SpO2 94%   BMI 23.92 kg/m²     Intake/Output Summary (Last 24 hours) at 1/19/2022 1307  Last data filed at 1/19/2022 0943  Gross per 24 hour   Intake 606 ml   Output 1500 ml   Net -894 ml       Physical examination:   General: awake alert, oriented x3, no abnormal position or movements. HEENT: normocephalic non-traumatic, no exophthalmos   Neck: supple  Pulm: Clear equal air entry no added sounds, no wheezing or rhonchi    Chest: Vascular access Rt. Chest. Lt chest scar. CVS: S1 + S2, no murmur  Abd: soft lax, nontender. Normal bowel sounds  Skin: warm, no lesions, no rash.     Neuro: CN intact, muscle power normal  Psych: normal mood, and affect    Review of Laboratory Data:  I personally reviewed the following labs:   Recent Labs     01/19/22  0739 01/19/22  1045   WBC 3.8*  --    RBC 2.45*  --    HGB 6.2* 7.8*   HCT 19.7* 23.9*   MCV 80.4  --    MCH 25.3*  --    MCHC 31.5*  --    RDW 20.6*  --    PLT 89*  --    MPV 10.9  --      Recent Labs     01/17/22  0545 01/18/22  0938 01/19/22  0520   * 136  135 133   K 4.3 4.0  3.9 4.6    102  101 99   CO2 23 28  28 26   BUN 10 6  6 9   CREATININE 3.6* 2.6*  2.6* 3.3*   GLUCOSE 106* 79  79  87 243*   CALCIUM 7.9* 8.2*  8.1* 7.9*     Beta-Hydroxybutyrate   Date Value Ref Range Status   01/14/2022 >4.50 (H) 0.02 - 0.27 mmol/L Final   12/31/2021 >4.50 (H) 0.02 - 0.27 mmol/L Final   11/22/2021 0.53 (H) 0.02 - 0.27 mmol/L Final     Lab Results   Component Value Date    LABA1C 8.7 12/08/2021    LABA1C 10.2 11/23/2021    LABA1C 10.6 09/28/2021     Lab Results   Component Value Date/Time    TSH 5.000 (H) 11/24/2021 08:52 AM    T4FREE 1.16 11/24/2021 08:52 AM    E7TBFGR 8.6 11/05/2015 02:20 AM    FT3 1.3 (L) 10/31/2020 10:43 AM    V4UDIEL 36.73 (L) 07/20/2020 02:00 PM     Lab Results   Component Value Date    LABA1C 8.7 12/08/2021    GLUCOSE 243 01/19/2022    GLUCOSE 130 05/18/2012    MALBCR 2949.3 01/15/2020    LABMICR 1740.1 01/15/2020    LABCREA 59 01/15/2020    LABCREA 61 01/15/2020     Lab Results   Component Value Date    TRIG 82 01/14/2020    HDL 33 01/14/2020    LDLCALC 46 01/14/2020    CHOL 95 01/14/2020       Blood culture   Lab Results   Component Value Date    BC 5 Days no growth 01/01/2022    BC 5 Days no growth 11/22/2021       Radiology:  XR CHEST PORTABLE   Final Result   Suspect early bibasilar infiltrates. Medical Records/Labs/Images review:   I personally reviewed and summarized previous records   All labs and imaging were reviewed independently     [address]donado, a 36 y.o.-old female seen today for inpatient diabetes management     Diabetes Mellitus type I    · Extremely insulin sensitive   · Pt with DM type 1, need long acting insulin all the time to prevent DKA   · Will adjust  diabetes regimen to    · Lantus to 1 (One) unit daily in AM   · Continue modified Low dose sliding scale 1:80>180 with meals and at night   · Discussed with bedside nurse about diet, Lantus dose and sliding scale. · Continue glucose check with meals and at bedtime   · Will titrate insulin dose based on the blood glucose trend & insulin requirement  · We will arrange for the patient to follow with us after discharge    primary Hypothyroidism  · On levothyroxine 75 mcg daily.  She was missing doses · To recheck TFT 6-8 weeks after discharge     N/V/ Diarrhea  · Resolved  · symptomatic Management per primary and GI services    Acute on Chronic Anemia  · S/p 1U PRBC transfusion    ESRD  · Pt at risk for hypoglycemia  · On dialysis, nephrology following    The above issues were reviewed with the patient who understood and agreed with the plan. Thank you for allowing us to participate in the care of this patient. Please do not hesitate to contact us with any additional questions. Luma Norwood MD  Endocrinologist, [facility] and Endocrinology   [address] [place] [address]   Phone: [phone]  Fax: [phone]  ----------------------------  An electronic signature was used to authenticate this note.  Yanet Newton MD on 1/19/2022 at 1:07 PM

## 2022-01-19 NOTE — PROGRESS NOTES
Department of Internal Medicine  Nephrology Progress Note    Events Reviewed. Subjective: We are following Ms. Khadijha Buckner for ESRD on HD. Seen on dialysis, tolerating well. Patient reports no complaints at this time. PHYSICAL EXAM:      Vitals:    VITALS:  BP (!) 140/88   Pulse 113   Temp 97.9 °F (36.6 °C) (Oral)   Resp 17   Ht 5' 4\" (1.626 m)   Wt 139 lb 5.3 oz (63.2 kg)   SpO2 94%   BMI 23.92 kg/m²   24HR INTAKE/OUTPUT:      Intake/Output Summary (Last 24 hours) at 1/19/2022 1227  Last data filed at 1/19/2022 5435  Gross per 24 hour   Intake 606 ml   Output 1500 ml   Net -894 ml       Access: RIJ tunneled dialysis catheter  Constitutional:  Lethargic, but wakens to voice  HEENT:  PERRL, normocephalic, atraumatic  Respiratory:  CTA bilateral   Cardiovascular/Edema:  ST, RRR, no murmur gallop or rub  Gastrointestinal:  abd distended, c/o persistent abdominal pain  Neurologic:  Lethargic, awakens to voice, follows commands, no focal deficit  Skin:  Warm, dry, ecchymotic areas to abdomen    Scheduled Meds:   [START ON 1/21/2022] epoetin nena-epbx  2,000 Units SubCUTAneous Once per day on Mon Wed Fri    And    [START ON 1/21/2022] epoetin nena-epbx  3,000 Units SubCUTAneous Once per day on Mon Wed Fri    insulin glargine  1 Units SubCUTAneous Nightly    metoclopramide  5 mg Oral 4x Daily AC & HS    cholestyramine  1 packet Oral BID    insulin lispro  0-4 Units SubCUTAneous TID WC    pregabalin  75 mg Oral Daily    ARIPiprazole  5 mg Oral Nightly    levothyroxine  75 mcg Oral Daily    mirtazapine  15 mg Oral Nightly    pantoprazole  40 mg Oral BID AC    rOPINIRole  0.25 mg Oral Nightly    heparin (porcine)  5,000 Units SubCUTAneous Q8H    influenza virus vaccine  0.5 mL IntraMUSCular Prior to discharge     Continuous Infusions:   dextrose       PRN Meds:. HYDROmorphone, glucose, dextrose, glucagon (rDNA), dextrose, potassium chloride **OR** potassium alternative oral replacement **OR** potassium chloride, dextrose, polyethylene glycol    DATA:    CBC with Differential:    Lab Results   Component Value Date    WBC 3.8 01/19/2022    RBC 2.45 01/19/2022    HGB 7.8 01/19/2022    HCT 23.9 01/19/2022    PLT 89 01/19/2022    MCV 80.4 01/19/2022    MCH 25.3 01/19/2022    MCHC 31.5 01/19/2022    RDW 20.6 01/19/2022    NRBC 0.9 08/10/2020    SEGSPCT 67 06/27/2013    METASPCT 1.7 08/10/2020    LYMPHOPCT 33.0 01/19/2022    MONOPCT 1.7 01/19/2022    MYELOPCT 0.9 01/19/2022    BASOPCT 0.9 01/19/2022    MONOSABS 0.08 01/19/2022    LYMPHSABS 1.25 01/19/2022    EOSABS 0.00 01/19/2022    BASOSABS 0.03 01/19/2022     CMP:    Lab Results   Component Value Date     01/19/2022    K 4.6 01/19/2022    K 4.2 11/24/2021    CL 99 01/19/2022    CO2 26 01/19/2022    BUN 9 01/19/2022    CREATININE 3.3 01/19/2022    GFRAA 20 01/19/2022    LABGLOM 20 01/19/2022    GLUCOSE 243 01/19/2022    GLUCOSE 130 05/18/2012    PROT 6.9 01/14/2022    LABALBU 3.4 01/14/2022    LABALBU 4.1 05/18/2012    CALCIUM 7.9 01/19/2022    BILITOT 0.4 01/14/2022    ALKPHOS 204 01/14/2022    AST 22 01/14/2022    ALT 14 01/14/2022     Magnesium:    Lab Results   Component Value Date    MG 1.7 01/19/2022     Phosphorus:    Lab Results   Component Value Date    PHOS 2.2 01/19/2022     Radiology Review:                Chest Xray 1/14/22   Suspect early bibasilar infiltrates. BRIEF SUMMARY OF INITIAL CONSULT:     Briefly, Miss Carol Beebe is a 36year-old female with a PMH of ESRD on home hemodialysis 5 days/wk, (initiated September 2021, Type I DM, poorly controlled with recurrent admissions for DKA, HTN, gastroparesis, ascites, infective endocarditis, cardiac arrest, hypothyroidism and depression. She was recently admitted earlier this month after testing positive for COVID 19. She presented to the ER on January 14th, 2022 with complaints of nausea and vomiting for one month and hyperglycemia.  She states she has not been compliant with insulin therapy recently. In the ER she was found to be in DKA with blood sugar of 400 And beta hydroxy greater than 4.5. Other pertinent lab work revealed sodium 132, chloride 92, anion gap 19, BUN 13, Creatinine 4.5, lactic acid 2.3. She was started on DKA protocol and IVFs in the ER. Renal is consulted for ESRD on HD and DKA. Problems Resolved:   · DKA,  beta hydroxy greater than 4.5. S/P insulin drip. Endocrinology following   · Hyponatremia, 2/2 decreased free water excretion from ESRD. 1L fluid restriction. · Hypokalemia, 2/2 GI losses from vomiting and poor oral intake  · Hypocalcemia, to obtain Vitamin D level. To Replace    IMPRESSION/RECOMMENDATIONS:       1. ESRD on home hemodialysis 4 days/wk (per patient) via RIJ tunneled dialysis catheter. States last HD treatment was Thursday. HD initiated September 2021 after failed peritoneal dialysis with persistent hyperkalemia. To continue HD MWF while inpatient. Had dialysis today. 2. Hyponatremia, 2/2 decreased free water excretion from ESRD. 1L fluid restriction. Sodium levels improved. 3. HTN, on lopressor and amlodipine at home  4. Hypophosphatemia, 2/2 poor intake  5. Vitamin D deficiency, on ergocalciferol at home  6. 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 Anemia of CKD,  Hgb 6.2 mg/dL today, increase epoetin alpha to 5,000 unit 3 times/wk and obtain iron studies  8. Type I DM, poorly controlled with frequent readmissions for DKA  ------------------------------------------------------  9. Hx Covid 19, unvaccinated  10. Restless leg syndrome, on ropinirole  11. Depression, on Abilify  12. Hypothyroidism, on levothyroxine  13. Obtain ionized ionized  14.  History of gastroparesis, on metoclopramide, appreciated GI consult.   15. Nutrition, clear liquid diet     Plan:     · HD today and three times/ wk M-W-F while inpatient, had dialysis today  · Increase epoetin alpha to 5,000 units 3 times/wk  · Obtain iron studies  · Replace phosphorus  · Continue to monitor phosphorus levels  · Monitor H&H, transfuse <7.  To transfuse 1 unit PRBCs today      Electronically signed by HEBER Canales CNP on 1/19/2022 at 12:27 PM

## 2022-01-19 NOTE — FLOWSHEET NOTE
01/19/22 0943   Vital Signs   BP (!) 154/88   Temp 98 °F (36.7 °C)   Pulse 111   Resp 18   Weight 139 lb 5.3 oz (63.2 kg)   Weight Method Bed scale   Percent Weight Change -1.56   Pain Assessment   Pain Assessment 0-10   Pain Level 0   Post-Hemodialysis Assessment   Post-Treatment Procedures Blood returned;Catheter capped, clamped and heparinized x 2 ports   Machine Disinfection Process Exterior Machine Disinfection   Rinseback Volume (ml) 300 ml   Total Liters Processed (l/min) 52 l/min   Dialyzer Clearance Lightly streaked   Duration of Treatment (minutes) 180 minutes   Heparin amount administered during treatment (units) 0 units   Hemodialysis Intake (ml) 300 ml   Hemodialysis Output (ml) 1500 ml   NET Removed (ml) 1200 ml   Tolerated Treatment Good   Patient Response to Treatment tolerated well, 1 unit prbc given, 1200cc fluid removal, profile c to b with uf adjustment   Bilateral Breath Sounds Diminished   Edema Right lower extremity; Left lower extremity   Physician Notified?  No

## 2022-01-19 NOTE — PROGRESS NOTES
Hospitalist Progress Note      SYNOPSIS: Patient admitted on 2022 for DKA. Pt with ESRD    Ms. [address], a 36y.o. year old female  who  has a past medical history of Acute congestive heart failure, ESRD on Dialysis, Cardiac arrest, Depression, Diabetes mellitus, Diabetic gastroparesis associated with type 1 diabetes mellitus, Diabetic ketoacidosis with coma associated with type 1 diabetes mellitus, Diabetic polyneuropathy, Endocarditis, Hyperlipidemia, Hyperosmolar hyperglycemic state, Hypothyroidism, Iron deficiency anemia, multiple drug resistant organisms resistance, MRSA, Non compliance w medication regimen, Previous stillbirth or demise, antepartum, Seizure Severe pre-eclampsia in third trimester, Shock liver, and Valvular endocarditis.      Patient presented to the emergency with complaints of nausea and vomiting. She has vomited 7 times on day of admission. Not able to keep anything down. She  Has a history of recent COVID diagnosis. Laboratory studies reveal potassium 3.2, creatinine 4.0, anion gap 18, glucose 327, calcium 7.4, troponin 162, alk phos 204, beta hydroxybutyrate >4.50, hemoglobin 9.6. COVID-19 negative. pH 7.31. Patient was started on IV fluids and insulin infusion for DKA. Nephrology and Endocrinology were consulted. Pt underwent HD as well. Gap closed. Pt continues to have intermittent nausea    SUBJECTIVE:    Feeling better    Temp (24hrs), Av.1 °F (36.7 °C), Min:97.9 °F (36.6 °C), Max:98.2 °F (36.8 °C)    DIET: ADULT DIET;  Clear Liquid  CODE: Full Code    Intake/Output Summary (Last 24 hours) at 2022 1404  Last data filed at 2022 0943  Gross per 24 hour   Intake 606 ml   Output 1500 ml   Net -894 ml       OBJECTIVE:    BP (!) 140/88   Pulse 113   Temp 97.9 °F (36.6 °C) (Oral)   Resp 17   Ht 5' 4\" (1.626 m)   Wt 139 lb 5.3 oz (63.2 kg)   SpO2 94%   BMI 23.92 kg/m²     General appearance: No apparent distress, appears stated age and cooperative. HEENT:  Conjunctivae/corneas clear. Neck: Supple. No jugular venous distention. Respiratory: Clear to auscultation bilaterally, normal respiratory effort  Cardiovascular: Regular rate rhythm, normal S1-S2  Abdomen: Soft, nontender, nondistended  Musculoskeletal: No clubbing, cyanosis, no bilateral lower extremity edema. Brisk capillary refill. Skin:  No rashes  on visible skin  Neurologic: awake, alert and following commands     ASSESSMENT:    Diabetic ketoacidosis  Hypokalemia  Elevated troponin in the setting of ESRD  End-stage renal disease on hemodialysis  Chronic anemia  Type 1 diabetes   Hypoglycemia. History of Cardiac arrest  Diabetic gastroparesis  History of Endocarditis       PLAN:    Anion gap closed. Continue to monitor labs. Pt still with intermittent nausea.    Endocrine following   Still with hypoglycemia  Start clear liquid diet  Symptoms likely gastroparesis  On reglan  GI input noted  S/p 1 unit transfusion    DISPOSITION:     Medications:  REVIEWED DAILY    Infusion Medications    dextrose       Scheduled Medications    [START ON 1/21/2022] epoetin nena-epbx  2,000 Units SubCUTAneous Once per day on Mon Wed Fri    And    [START ON 1/21/2022] epoetin nena-epbx  3,000 Units SubCUTAneous Once per day on Mon Wed Fri    insulin glargine  1 Units SubCUTAneous Nightly    sodium phosphate IVPB  10 mmol IntraVENous Once    metoclopramide  5 mg Oral 4x Daily AC & HS    cholestyramine  1 packet Oral BID    insulin lispro  0-4 Units SubCUTAneous TID WC    pregabalin  75 mg Oral Daily    ARIPiprazole  5 mg Oral Nightly    levothyroxine  75 mcg Oral Daily    mirtazapine  15 mg Oral Nightly    pantoprazole  40 mg Oral BID AC    rOPINIRole  0.25 mg Oral Nightly    heparin (porcine)  5,000 Units SubCUTAneous Q8H    influenza virus vaccine  0.5 mL IntraMUSCular Prior to discharge     PRN Meds: HYDROmorphone, glucose, dextrose, glucagon (rDNA), dextrose, potassium chloride **OR** potassium alternative oral replacement **OR** potassium chloride, dextrose, polyethylene glycol    Labs:     Recent Labs     01/19/22  0739 01/19/22  1045   WBC 3.8*  --    HGB 6.2* 7.8*   HCT 19.7* 23.9*   PLT 89*  --        Recent Labs     01/17/22  0545 01/18/22  0938 01/19/22  0520   * 136  135 133   K 4.3 4.0  3.9 4.6    102  101 99   CO2 23 28  28 26   BUN 10 6  6 9   CREATININE 3.6* 2.6*  2.6* 3.3*   CALCIUM 7.9* 8.2*  8.1* 7.9*   PHOS 1.3* 1.6* 2.2*       No results for input(s): PROT, ALB, ALKPHOS, ALT, AST, BILITOT, AMYLASE, LIPASE in the last 72 hours. No results for input(s): INR in the last 72 hours. No results for input(s): Jan Yi in the last 72 hours. Chronic labs:    Lab Results   Component Value Date    CHOL 95 01/14/2020    TRIG 82 01/14/2020    HDL 33 01/14/2020    LDLCALC 46 01/14/2020    TSH 5.000 (H) 11/24/2021    INR 1.1 12/08/2021    LABA1C 8.7 (H) 12/08/2021       Radiology: REVIEWED DAILY    +++++++++++++++++++++++++++++++++++++++++++++++++  Mary Bearden MD  [address], [place]  +++++++++++++++++++++++++++++++++++++++++++++++++  NOTE: This report was transcribed using voice recognition software. Every effort was made to ensure accuracy; however, inadvertent computerized transcription errors may be present.

## 2022-01-20 LAB
ANION GAP SERPL CALCULATED.3IONS-SCNC: 6 MMOL/L (ref 7–16)
BUN BLDV-MCNC: 6 MG/DL (ref 6–20)
CALCIUM SERPL-MCNC: 8 MG/DL (ref 8.6–10.2)
CHLORIDE BLD-SCNC: 102 MMOL/L (ref 98–107)
CO2: 27 MMOL/L (ref 22–29)
CREAT SERPL-MCNC: 2.3 MG/DL (ref 0.5–1)
GFR AFRICAN AMERICAN: 30
GFR NON-AFRICAN AMERICAN: 30 ML/MIN/1.73
GLUCOSE BLD-MCNC: 143 MG/DL (ref 74–99)
IRON SATURATION: 77 % (ref 15–50)
IRON: 40 MCG/DL (ref 37–145)
MAGNESIUM: 1.6 MG/DL (ref 1.6–2.6)
METER GLUCOSE: 117 MG/DL (ref 74–99)
METER GLUCOSE: 201 MG/DL (ref 74–99)
METER GLUCOSE: 208 MG/DL (ref 74–99)
PHOSPHORUS: 2.2 MG/DL (ref 2.5–4.5)
POTASSIUM SERPL-SCNC: 4.6 MMOL/L (ref 3.5–5)
SODIUM BLD-SCNC: 135 MMOL/L (ref 132–146)
TOTAL IRON BINDING CAPACITY: 52 MCG/DL (ref 250–450)

## 2022-01-20 PROCEDURE — 6370000000 HC RX 637 (ALT 250 FOR IP): Performed by: INTERNAL MEDICINE

## 2022-01-20 PROCEDURE — 6360000002 HC RX W HCPCS: Performed by: FAMILY MEDICINE

## 2022-01-20 PROCEDURE — 99232 SBSQ HOSP IP/OBS MODERATE 35: CPT | Performed by: INTERNAL MEDICINE

## 2022-01-20 PROCEDURE — 80048 BASIC METABOLIC PNL TOTAL CA: CPT

## 2022-01-20 PROCEDURE — 2500000003 HC RX 250 WO HCPCS: Performed by: INTERNAL MEDICINE

## 2022-01-20 PROCEDURE — 2060000000 HC ICU INTERMEDIATE R&B

## 2022-01-20 PROCEDURE — 84100 ASSAY OF PHOSPHORUS: CPT

## 2022-01-20 PROCEDURE — 6370000000 HC RX 637 (ALT 250 FOR IP): Performed by: STUDENT IN AN ORGANIZED HEALTH CARE EDUCATION/TRAINING PROGRAM

## 2022-01-20 PROCEDURE — 2580000003 HC RX 258: Performed by: INTERNAL MEDICINE

## 2022-01-20 PROCEDURE — 83550 IRON BINDING TEST: CPT

## 2022-01-20 PROCEDURE — 83735 ASSAY OF MAGNESIUM: CPT

## 2022-01-20 PROCEDURE — 83540 ASSAY OF IRON: CPT

## 2022-01-20 PROCEDURE — 6370000000 HC RX 637 (ALT 250 FOR IP): Performed by: FAMILY MEDICINE

## 2022-01-20 PROCEDURE — 36415 COLL VENOUS BLD VENIPUNCTURE: CPT

## 2022-01-20 PROCEDURE — 6360000002 HC RX W HCPCS: Performed by: INTERNAL MEDICINE

## 2022-01-20 PROCEDURE — 82962 GLUCOSE BLOOD TEST: CPT

## 2022-01-20 RX ADMIN — HYDROMORPHONE HYDROCHLORIDE 0.25 MG: 1 INJECTION, SOLUTION INTRAMUSCULAR; INTRAVENOUS; SUBCUTANEOUS at 21:22

## 2022-01-20 RX ADMIN — METOCLOPRAMIDE HYDROCHLORIDE 5 MG: 5 TABLET ORAL at 11:06

## 2022-01-20 RX ADMIN — CHOLESTYRAMINE 4 G: 4 POWDER, FOR SUSPENSION ORAL at 21:20

## 2022-01-20 RX ADMIN — ROPINIROLE 0.25 MG: 0.25 TABLET, FILM COATED ORAL at 21:30

## 2022-01-20 RX ADMIN — CHOLESTYRAMINE 4 G: 4 POWDER, FOR SUSPENSION ORAL at 09:30

## 2022-01-20 RX ADMIN — PANTOPRAZOLE SODIUM 40 MG: 40 TABLET, DELAYED RELEASE ORAL at 05:57

## 2022-01-20 RX ADMIN — ARIPIPRAZOLE 5 MG: 5 TABLET ORAL at 21:30

## 2022-01-20 RX ADMIN — SODIUM PHOSPHATE, MONOBASIC, MONOHYDRATE AND SODIUM PHOSPHATE, DIBASIC, ANHYDROUS 10 MMOL: 276; 142 INJECTION, SOLUTION INTRAVENOUS at 16:52

## 2022-01-20 RX ADMIN — HEPARIN SODIUM 5000 UNITS: 10000 INJECTION INTRAVENOUS; SUBCUTANEOUS at 05:58

## 2022-01-20 RX ADMIN — HEPARIN SODIUM 5000 UNITS: 10000 INJECTION INTRAVENOUS; SUBCUTANEOUS at 21:22

## 2022-01-20 RX ADMIN — PREGABALIN 75 MG: 75 CAPSULE ORAL at 09:30

## 2022-01-20 RX ADMIN — METOCLOPRAMIDE HYDROCHLORIDE 5 MG: 5 TABLET ORAL at 21:30

## 2022-01-20 RX ADMIN — HYDROMORPHONE HYDROCHLORIDE 0.25 MG: 1 INJECTION, SOLUTION INTRAMUSCULAR; INTRAVENOUS; SUBCUTANEOUS at 11:07

## 2022-01-20 RX ADMIN — INSULIN LISPRO 1 UNITS: 100 INJECTION, SOLUTION INTRAVENOUS; SUBCUTANEOUS at 11:28

## 2022-01-20 RX ADMIN — HYDROMORPHONE HYDROCHLORIDE 0.25 MG: 1 INJECTION, SOLUTION INTRAMUSCULAR; INTRAVENOUS; SUBCUTANEOUS at 02:56

## 2022-01-20 RX ADMIN — HEPARIN SODIUM 5000 UNITS: 10000 INJECTION INTRAVENOUS; SUBCUTANEOUS at 14:30

## 2022-01-20 RX ADMIN — METOCLOPRAMIDE HYDROCHLORIDE 5 MG: 5 TABLET ORAL at 16:52

## 2022-01-20 RX ADMIN — MIRTAZAPINE 15 MG: 15 TABLET, FILM COATED ORAL at 21:30

## 2022-01-20 RX ADMIN — PANTOPRAZOLE SODIUM 40 MG: 40 TABLET, DELAYED RELEASE ORAL at 16:52

## 2022-01-20 RX ADMIN — METOCLOPRAMIDE HYDROCHLORIDE 5 MG: 5 TABLET ORAL at 05:57

## 2022-01-20 RX ADMIN — LEVOTHYROXINE SODIUM 75 MCG: 0.03 TABLET ORAL at 05:57

## 2022-01-20 RX ADMIN — HYDROMORPHONE HYDROCHLORIDE 0.25 MG: 1 INJECTION, SOLUTION INTRAMUSCULAR; INTRAVENOUS; SUBCUTANEOUS at 16:52

## 2022-01-20 ASSESSMENT — PAIN SCALES - GENERAL
PAINLEVEL_OUTOF10: 6
PAINLEVEL_OUTOF10: 8
PAINLEVEL_OUTOF10: 8
PAINLEVEL_OUTOF10: 7
PAINLEVEL_OUTOF10: 8
PAINLEVEL_OUTOF10: 7

## 2022-01-20 ASSESSMENT — PAIN DESCRIPTION - LOCATION
LOCATION: GENERALIZED
LOCATION: GENERALIZED
LOCATION: BACK

## 2022-01-20 ASSESSMENT — PAIN DESCRIPTION - DESCRIPTORS: DESCRIPTORS: ACHING;DISCOMFORT

## 2022-01-20 ASSESSMENT — PAIN DESCRIPTION - PAIN TYPE
TYPE: CHRONIC PAIN

## 2022-01-20 ASSESSMENT — PAIN - FUNCTIONAL ASSESSMENT: PAIN_FUNCTIONAL_ASSESSMENT: ACTIVITIES ARE NOT PREVENTED

## 2022-01-20 ASSESSMENT — PAIN DESCRIPTION - ONSET: ONSET: ON-GOING

## 2022-01-20 ASSESSMENT — PAIN DESCRIPTION - FREQUENCY: FREQUENCY: CONTINUOUS

## 2022-01-20 ASSESSMENT — PAIN DESCRIPTION - PROGRESSION: CLINICAL_PROGRESSION: NOT CHANGED

## 2022-01-20 NOTE — PROGRESS NOTES
ENDOCRINOLOGY PROGRESS NOTE      Date of admission: 1/14/2022  Date of service: 1/20/2022  Admitting physician: Loki Gutierrez DO   Primary Care Physician: Charissa Jain MD  Consultant physician: Lalitha Hoffman MD     Reason for the consultation:  Uncontrolled DM,labile BG    History of Present Illness: The history is provided by the patient. Accuracy of the patient data is excellent    [address] Maldon[address] is a very pleasant 36 y.o. old female with PMH of Type I DM, ESRD on PD,HTN,hypothyroidism, infective endocarditis and other listed below admitted to [facility] on 1/14/2022 because of N/V, abdominal pain and found to be in DKA. Endocrine service was consulted for DM management. Subjective   Seen and examined this AM.   Very labile blood sugars  Ordered 1U lantus daily- given 1U last night. On LDSS- 1U in the last 24H. BG labile- 201 this am    No longer complaining of nausea, abdominal pain or diarrhea. States she is hungry but has had poor appetite with current diet.     Inpatient diet:   Poor appetite, Full liquid    Point of care glucose monitoring (Independently reviewed)   Recent Labs     01/19/22  0553 01/19/22  1100 01/19/22  1723 01/19/22  2004 01/19/22  2030 01/19/22  2113 01/20/22  0939 01/20/22  1105   GLUMET 235* 96 85 56* 67* 129* 201* 208*       Scheduled Meds:   [START ON 1/21/2022] epoetin nena-epbx  2,000 Units SubCUTAneous Once per day on Mon Wed Fri    And    [START ON 1/21/2022] epoetin nena-epbx  3,000 Units SubCUTAneous Once per day on Mon Wed Fri    insulin glargine  1 Units SubCUTAneous QAM    metoclopramide  5 mg Oral 4x Daily AC & HS    cholestyramine  1 packet Oral BID    insulin lispro  0-4 Units SubCUTAneous TID WC    pregabalin  75 mg Oral Daily    ARIPiprazole  5 mg Oral Nightly    levothyroxine  75 mcg Oral Daily    mirtazapine  15 mg Oral Nightly    pantoprazole  40 mg Oral BID AC    rOPINIRole  0.25 mg Oral Nightly    heparin (porcine)  5,000 Units SubCUTAneous Q8H  influenza virus vaccine  0.5 mL IntraMUSCular Prior to discharge     PRN Meds:   HYDROmorphone, 0.25 mg, Q4H PRN  glucose, 15 g, PRN  dextrose, 12.5 g, PRN  glucagon (rDNA), 1 mg, PRN  dextrose, 100 mL/hr, PRN  potassium chloride, 40 mEq, PRN   Or  potassium alternative oral replacement, 40 mEq, PRN   Or  potassium chloride, 10 mEq, PRN  dextrose, 12.5 g, PRN  polyethylene glycol, 17 g, Daily PRN      Continuous Infusions:   dextrose         Review of Systems  All systems reviewed. All negative except for symptoms mentioned in HPI     OBJECTIVE    BP (!) 148/90   Pulse 100   Temp 98.3 °F (36.8 °C) (Oral)   Resp 16   Ht 5' 4\" (1.626 m)   Wt 141 lb 2 oz (64 kg)   SpO2 93%   BMI 24.22 kg/m²     Intake/Output Summary (Last 24 hours) at 1/20/2022 1153  Last data filed at 1/20/2022 0000  Gross per 24 hour   Intake 04254 ml   Output --   Net 97692 ml       Physical examination:   General: awake alert, oriented x3, no abnormal position or movements. HEENT: normocephalic non-traumatic, no exophthalmos   Neck: supple  Pulm: Clear equal air entry no added sounds, no wheezing or rhonchi    Chest: Vascular access Rt. Chest. Lt chest scar. CVS: S1 + S2, no murmur  Abd: soft lax, nontender. Normal bowel sounds  Skin: warm, no lesions, no rash.     Neuro: CN intact, muscle power normal  Psych: normal mood, and affect    Review of Laboratory Data:  I personally reviewed the following labs:   Recent Labs     01/19/22  0739 01/19/22  1045   WBC 3.8*  --    RBC 2.45*  --    HGB 6.2* 7.8*   HCT 19.7* 23.9*   MCV 80.4  --    MCH 25.3*  --    MCHC 31.5*  --    RDW 20.6*  --    PLT 89*  --    MPV 10.9  --      Recent Labs     01/18/22  0938 01/19/22  0520 01/20/22  0313     135 133 135   K 4.0  3.9 4.6 4.6     101 99 102   CO2 28  28 26 27   BUN 6  6 9 6   CREATININE 2.6*  2.6* 3.3* 2.3*   GLUCOSE 79  79  87 243* 143*   CALCIUM 8.2*  8.1* 7.9* 8.0*     Beta-Hydroxybutyrate   Date Value Ref Range Status 01/14/2022 >4.50 (H) 0.02 - 0.27 mmol/L Final   12/31/2021 >4.50 (H) 0.02 - 0.27 mmol/L Final   11/22/2021 0.53 (H) 0.02 - 0.27 mmol/L Final     Lab Results   Component Value Date    LABA1C 8.7 12/08/2021    LABA1C 10.2 11/23/2021    LABA1C 10.6 09/28/2021     Lab Results   Component Value Date/Time    TSH 5.000 (H) 11/24/2021 08:52 AM    T4FREE 1.16 11/24/2021 08:52 AM    F1UDLEC 8.6 11/05/2015 02:20 AM    FT3 1.3 (L) 10/31/2020 10:43 AM    L1CJEHJ 36.73 (L) 07/20/2020 02:00 PM     Lab Results   Component Value Date    LABA1C 8.7 12/08/2021    GLUCOSE 143 01/20/2022    GLUCOSE 130 05/18/2012    MALBCR 2949.3 01/15/2020    LABMICR 1740.1 01/15/2020    LABCREA 59 01/15/2020    LABCREA 61 01/15/2020     Lab Results   Component Value Date    TRIG 82 01/14/2020    HDL 33 01/14/2020    LDLCALC 46 01/14/2020    CHOL 95 01/14/2020       Blood culture   Lab Results   Component Value Date    BC 5 Days no growth 01/01/2022    BC 5 Days no growth 11/22/2021       Radiology:  XR CHEST PORTABLE   Final Result   Suspect early bibasilar infiltrates. Medical Records/Labs/Images review:   I personally reviewed and summarized previous records   All labs and imaging were reviewed independently     [address], a 36 y.o.-old female seen today for inpatient diabetes management     Diabetes Mellitus type I    · Extremely insulin sensitive   · Pt with DM type 1, need long acting insulin all the time to prevent DKA   · Will adjust  diabetes regimen to    · Lantus to 1 (One) unit daily   · Continue modified Low dose sliding scale 1:80>180 with meals and at night    · Continue glucose check with meals and at bedtime   · Will titrate insulin dose based on the blood glucose trend & insulin requirement  · We will arrange for the patient to follow with us after discharge    Primary Hypothyroidism  · On levothyroxine 75 mcg daily.  She was missing doses   · To recheck TFT 6-8 weeks after discharge     N/V/ Diarrhea  · Resolved  · Symptomatic Management per primary and GI services    Acute on Chronic Anemia  · S/p 1U PRBC transfusion    ESRD  · Pt at risk for hypoglycemia  · On dialysis, nephrology following    The above issues were reviewed with the patient who understood and agreed with the plan. Thank you for allowing us to participate in the care of this patient. Please do not hesitate to contact us with any additional questions. Electronically signed by Toya Prado MD on 1/20/2022 at 11:53 Candice Stoo MD  Endocrinologist, [facility]   [address] [place] [address]   Phone: [phone]  Fax: [phone]  ----------------------------  An electronic signature was used to authenticate this note.  Raisa Mccain MD on 1/20/2022 at 11:53 AM

## 2022-01-20 NOTE — PROGRESS NOTES
Pt states nausea is still controlled, diet advanced to full liquid. Pt is asleep most of the day and doesn't move around much. She is sound asleep before asking for pain medication. Physician notified and acknowledged message.

## 2022-01-20 NOTE — CARE COORDINATION
1/20/2022 - Nephrology and endocrinology following. HD being done M-W-F while in hospital. Pt does home HD 4 times a week. Last hgb 7.8 after 1 unit PRBC with HD yesterday. Advanced to full liquid diet. Her plan remains to discharge home when medically stable. She will continue home HD when she returns home. Her family will provide transportation home. SW/CM will follow.

## 2022-01-20 NOTE — PROGRESS NOTES
Department of Internal Medicine  Nephrology Progress Note    Events Reviewed. SUBJECTIVE:  We are following Ms. Khadijah Buckner for ESRD on HD. Reports feeling better today. PHYSICAL EXAM:      Vitals:    VITALS:  BP (!) 148/90   Pulse 100   Temp 98.3 °F (36.8 °C) (Oral)   Resp 16   Ht 5' 4\" (1.626 m)   Wt 141 lb 2 oz (64 kg)   SpO2 93%   BMI 24.22 kg/m²   24HR INTAKE/OUTPUT:      Intake/Output Summary (Last 24 hours) at 1/20/2022 0950  Last data filed at 1/20/2022 0000  Gross per 24 hour   Intake 13126 ml   Output --   Net 20328 ml       Access: RIJ tunneled dialysis catheter  Constitutional:  Lethargic, but wakens to voice  HEENT:  PERRL, normocephalic, atraumatic  Respiratory:  CTA bilateral   Cardiovascular/Edema:  ST, RRR, no murmur gallop or rub  Gastrointestinal:  abd distended, c/o persistent abdominal pain  Neurologic:  Lethargic, awakens to voice, follows commands, no focal deficit  Skin:  Warm, dry, ecchymotic areas to abdomen    Scheduled Meds:   [START ON 1/21/2022] epoetin nena-epbx  2,000 Units SubCUTAneous Once per day on Mon Wed Fri    And    [START ON 1/21/2022] epoetin nena-epbx  3,000 Units SubCUTAneous Once per day on Mon Wed Fri    insulin glargine  1 Units SubCUTAneous QAM    metoclopramide  5 mg Oral 4x Daily AC & HS    cholestyramine  1 packet Oral BID    insulin lispro  0-4 Units SubCUTAneous TID WC    pregabalin  75 mg Oral Daily    ARIPiprazole  5 mg Oral Nightly    levothyroxine  75 mcg Oral Daily    mirtazapine  15 mg Oral Nightly    pantoprazole  40 mg Oral BID AC    rOPINIRole  0.25 mg Oral Nightly    heparin (porcine)  5,000 Units SubCUTAneous Q8H    influenza virus vaccine  0.5 mL IntraMUSCular Prior to discharge     Continuous Infusions:   dextrose       PRN Meds:. HYDROmorphone, glucose, dextrose, glucagon (rDNA), dextrose, potassium chloride **OR** potassium alternative oral replacement **OR** potassium chloride, dextrose, polyethylene glycol    DATA: CBC with Differential:    Lab Results   Component Value Date    WBC 3.8 01/19/2022    RBC 2.45 01/19/2022    HGB 7.8 01/19/2022    HCT 23.9 01/19/2022    PLT 89 01/19/2022    MCV 80.4 01/19/2022    MCH 25.3 01/19/2022    MCHC 31.5 01/19/2022    RDW 20.6 01/19/2022    NRBC 0.9 08/10/2020    SEGSPCT 67 06/27/2013    METASPCT 1.7 08/10/2020    LYMPHOPCT 33.0 01/19/2022    MONOPCT 1.7 01/19/2022    MYELOPCT 0.9 01/19/2022    BASOPCT 0.9 01/19/2022    MONOSABS 0.08 01/19/2022    LYMPHSABS 1.25 01/19/2022    EOSABS 0.00 01/19/2022    BASOSABS 0.03 01/19/2022     CMP:    Lab Results   Component Value Date     01/20/2022    K 4.6 01/20/2022    K 4.2 11/24/2021     01/20/2022    CO2 27 01/20/2022    BUN 6 01/20/2022    CREATININE 2.3 01/20/2022    GFRAA 30 01/20/2022    LABGLOM 30 01/20/2022    GLUCOSE 143 01/20/2022    GLUCOSE 130 05/18/2012    PROT 6.9 01/14/2022    LABALBU 3.4 01/14/2022    LABALBU 4.1 05/18/2012    CALCIUM 8.0 01/20/2022    BILITOT 0.4 01/14/2022    ALKPHOS 204 01/14/2022    AST 22 01/14/2022    ALT 14 01/14/2022     Magnesium:    Lab Results   Component Value Date    MG 1.6 01/20/2022     Phosphorus:    Lab Results   Component Value Date    PHOS 2.2 01/20/2022     Radiology Review:                Chest Xray 1/14/22   Suspect early bibasilar infiltrates. BRIEF SUMMARY OF INITIAL CONSULT:     Briefly, Miss Sal Keys is a 36year-old female with a PMH of ESRD on home hemodialysis 5 days/wk, (initiated September 2021, Type I DM, poorly controlled with recurrent admissions for DKA, HTN, gastroparesis, ascites, infective endocarditis, cardiac arrest, hypothyroidism and depression. She was recently admitted earlier this month after testing positive for COVID 19. She presented to the ER on January 14th, 2022 with complaints of nausea and vomiting for one month and hyperglycemia. She states she has not been compliant with insulin therapy recently.  In the ER she was found to be in DKA with blood sugar of 400 And beta hydroxy greater than 4.5. Other pertinent lab work revealed sodium 132, chloride 92, anion gap 19, BUN 13, Creatinine 4.5, lactic acid 2.3. She was started on DKA protocol and IVFs in the ER. Renal is consulted for ESRD on HD and DKA. Problems Resolved:   · DKA,  beta hydroxy greater than 4.5. S/P insulin drip. Endocrinology following   · Hyponatremia, 2/2 decreased free water excretion from ESRD. 1L fluid restriction. · Hypokalemia, 2/2 GI losses from vomiting and poor oral intake  · Hypocalcemia, to obtain Vitamin D level. To Replace  · Hyponatremia, 2/2 decreased free water excretion from ESRD. 1L fluid restriction. Sodium levels improved. IMPRESSION/RECOMMENDATIONS:       1. ESRD on home hemodialysis 4 days/wk (per patient) via RIJ tunneled dialysis catheter. States last HD treatment was Thursday. To continue HD MWF while inpatient. Had dialysis yesterday    2. HTN, on lopressor and amlodipine   3. Hypophosphatemia, 2/2 poor intake  4. Vitamin D deficiency, on ergocalciferol at home  5. 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 Anemia of CKD, iron level 40, iron saturation 77%, on alpha to 5,000 unit 3 times/wk, status post transfusion  7. Type I DM, poorly controlled with frequent readmissions for DKA  ------------------------------------------------------  8. Hx Covid 19, unvaccinated  9. Restless leg syndrome, on ropinirole  10. Depression, on Abilify  11. Hypothyroidism, on levothyroxine  12. Obtain ionized ionized  13.  History of gastroparesis, on metoclopramide, appreciated GI consult.   14. Nutrition, clear liquid diet     Plan:     · HD 3 times a week M-W-F   · Continue 5,000 units 3 times/wk  · Replace phosphorus  · Continue to monitor phosphorus levels  · Continue to monitor H&H       Electronically signed by Nara Perales MD on 1/20/2022 at 9:50 AM

## 2022-01-20 NOTE — PROGRESS NOTES
Hospitalist Progress Note      SYNOPSIS: Patient admitted on 2022 for DKA. Pt with ESRD    Ms. [address]deri[address], a 36y.o. year old female  who  has a past medical history of Acute congestive heart failure, ESRD on Dialysis, Cardiac arrest, Depression, Diabetes mellitus, Diabetic gastroparesis associated with type 1 diabetes mellitus, Diabetic ketoacidosis with coma associated with type 1 diabetes mellitus, Diabetic polyneuropathy, Endocarditis, Hyperlipidemia, Hyperosmolar hyperglycemic state, Hypothyroidism, Iron deficiency anemia, multiple drug resistant organisms resistance, MRSA, Non compliance w medication regimen, Previous stillbirth or demise, antepartum, Seizure Severe pre-eclampsia in third trimester, Shock liver, and Valvular endocarditis.      Patient presented to the emergency with complaints of nausea and vomiting. She has vomited 7 times on day of admission. Not able to keep anything down. She  Has a history of recent COVID diagnosis. Laboratory studies reveal potassium 3.2, creatinine 4.0, anion gap 18, glucose 327, calcium 7.4, troponin 162, alk phos 204, beta hydroxybutyrate >4.50, hemoglobin 9.6. COVID-19 negative. pH 7.31. Patient was started on IV fluids and insulin infusion for DKA. Nephrology and Endocrinology were consulted. Pt underwent HD as well. Gap closed. Pt continues to have intermittent nausea    SUBJECTIVE:    Abdominal pain improved  No emesis  She would like to advance her diet    Temp (24hrs), Av.3 °F (36.8 °C), Min:98.2 °F (36.8 °C), Max:98.3 °F (36.8 °C)    DIET: ADULT DIET;  Full Liquid  CODE: Full Code    Intake/Output Summary (Last 24 hours) at 2022 1406  Last data filed at 2022 0000  Gross per 24 hour   Intake 37605 ml   Output --   Net 11299 ml       OBJECTIVE:    BP (!) 148/90   Pulse 100   Temp 98.3 °F (36.8 °C) (Oral)   Resp 16   Ht 5' 4\" (1.626 m)   Wt 141 lb 2 oz (64 kg)   SpO2 93%   BMI 24.22 kg/m²     General appearance: No apparent distress, appears stated age and cooperative. HEENT:  Conjunctivae/corneas clear. Neck: Supple. No jugular venous distention. Respiratory: Clear to auscultation bilaterally, normal respiratory effort  Cardiovascular: Regular rate rhythm, normal S1-S2  Abdomen: Soft, nontender, nondistended  Musculoskeletal: No clubbing, cyanosis, no bilateral lower extremity edema. Brisk capillary refill. Skin:  No rashes  on visible skin  Neurologic: awake, alert and following commands     ASSESSMENT:    Diabetic ketoacidosis  Hypokalemia  Elevated troponin in the setting of ESRD  End-stage renal disease on hemodialysis  Chronic anemia  Type 1 diabetes   Hypoglycemia.   History of Cardiac arrest  Diabetic gastroparesis  History of Endocarditis       PLAN:      Endocrine following -we will also follow-up outpatient  Sugars are better no further hypoglycemia  Advance diet as tolerated  Symptoms likely gastroparesis  On reglan      DISPOSITION:     Medications:  REVIEWED DAILY    Infusion Medications    dextrose       Scheduled Medications    [START ON 1/21/2022] epoetin nena-epbx  2,000 Units SubCUTAneous Once per day on Mon Wed Fri    And    [START ON 1/21/2022] epoetin nena-epbx  3,000 Units SubCUTAneous Once per day on Mon Wed Fri    insulin glargine  1 Units SubCUTAneous QAM    metoclopramide  5 mg Oral 4x Daily AC & HS    cholestyramine  1 packet Oral BID    insulin lispro  0-4 Units SubCUTAneous TID WC    pregabalin  75 mg Oral Daily    ARIPiprazole  5 mg Oral Nightly    levothyroxine  75 mcg Oral Daily    mirtazapine  15 mg Oral Nightly    pantoprazole  40 mg Oral BID AC    rOPINIRole  0.25 mg Oral Nightly    heparin (porcine)  5,000 Units SubCUTAneous Q8H    influenza virus vaccine  0.5 mL IntraMUSCular Prior to discharge     PRN Meds: HYDROmorphone, glucose, dextrose, glucagon (rDNA), dextrose, potassium chloride **OR** potassium alternative oral replacement **OR** potassium chloride, dextrose, polyethylene glycol    Labs:     Recent Labs     01/19/22  0739 01/19/22  1045   WBC 3.8*  --    HGB 6.2* 7.8*   HCT 19.7* 23.9*   PLT 89*  --        Recent Labs     01/18/22  0938 01/19/22  0520 01/20/22  0313     135 133 135   K 4.0  3.9 4.6 4.6     101 99 102   CO2 28  28 26 27   BUN 6  6 9 6   CREATININE 2.6*  2.6* 3.3* 2.3*   CALCIUM 8.2*  8.1* 7.9* 8.0*   PHOS 1.6* 2.2* 2.2*       No results for input(s): PROT, ALB, ALKPHOS, ALT, AST, BILITOT, AMYLASE, LIPASE in the last 72 hours. No results for input(s): INR in the last 72 hours. No results for input(s): Skippy Gault in the last 72 hours. Chronic labs:    Lab Results   Component Value Date    CHOL 95 01/14/2020    TRIG 82 01/14/2020    HDL 33 01/14/2020    LDLCALC 46 01/14/2020    TSH 5.000 (H) 11/24/2021    INR 1.1 12/08/2021    LABA1C 8.7 (H) 12/08/2021       Radiology: REVIEWED DAILY    +++++++++++++++++++++++++++++++++++++++++++++++++  Grayson Whitt MD  [facility]  +++++++++++++++++++++++++++++++++++++++++++++++++  NOTE: This report was transcribed using voice recognition software. Every effort was made to ensure accuracy; however, inadvertent computerized transcription errors may be present.

## 2022-01-21 LAB
ANION GAP SERPL CALCULATED.3IONS-SCNC: 14 MMOL/L (ref 7–16)
BUN BLDV-MCNC: 11 MG/DL (ref 6–20)
CALCIUM SERPL-MCNC: 7.4 MG/DL (ref 8.6–10.2)
CHLORIDE BLD-SCNC: 100 MMOL/L (ref 98–107)
CO2: 20 MMOL/L (ref 22–29)
CREAT SERPL-MCNC: 3.4 MG/DL (ref 0.5–1)
GFR AFRICAN AMERICAN: 19
GFR NON-AFRICAN AMERICAN: 19 ML/MIN/1.73
GLUCOSE BLD-MCNC: 432 MG/DL (ref 74–99)
MAGNESIUM: 1.7 MG/DL (ref 1.6–2.6)
METER GLUCOSE: 125 MG/DL (ref 74–99)
METER GLUCOSE: 155 MG/DL (ref 74–99)
METER GLUCOSE: 169 MG/DL (ref 74–99)
METER GLUCOSE: 290 MG/DL (ref 74–99)
METER GLUCOSE: 398 MG/DL (ref 74–99)
METER GLUCOSE: 50 MG/DL (ref 74–99)
METER GLUCOSE: 50 MG/DL (ref 74–99)
METER GLUCOSE: 54 MG/DL (ref 74–99)
PHOSPHORUS: 3.4 MG/DL (ref 2.5–4.5)
POTASSIUM SERPL-SCNC: 5.5 MMOL/L (ref 3.5–5)
SODIUM BLD-SCNC: 134 MMOL/L (ref 132–146)

## 2022-01-21 PROCEDURE — 83735 ASSAY OF MAGNESIUM: CPT

## 2022-01-21 PROCEDURE — 2500000003 HC RX 250 WO HCPCS: Performed by: FAMILY MEDICINE

## 2022-01-21 PROCEDURE — 36591 DRAW BLOOD OFF VENOUS DEVICE: CPT

## 2022-01-21 PROCEDURE — 6370000000 HC RX 637 (ALT 250 FOR IP): Performed by: INTERNAL MEDICINE

## 2022-01-21 PROCEDURE — 36415 COLL VENOUS BLD VENIPUNCTURE: CPT

## 2022-01-21 PROCEDURE — 6360000002 HC RX W HCPCS: Performed by: INTERNAL MEDICINE

## 2022-01-21 PROCEDURE — 80048 BASIC METABOLIC PNL TOTAL CA: CPT

## 2022-01-21 PROCEDURE — 2060000000 HC ICU INTERMEDIATE R&B

## 2022-01-21 PROCEDURE — 2580000003 HC RX 258: Performed by: EMERGENCY MEDICINE

## 2022-01-21 PROCEDURE — 6360000002 HC RX W HCPCS: Performed by: FAMILY MEDICINE

## 2022-01-21 PROCEDURE — 6370000000 HC RX 637 (ALT 250 FOR IP): Performed by: EMERGENCY MEDICINE

## 2022-01-21 PROCEDURE — 84100 ASSAY OF PHOSPHORUS: CPT

## 2022-01-21 PROCEDURE — 90935 HEMODIALYSIS ONE EVALUATION: CPT

## 2022-01-21 PROCEDURE — 6360000002 HC RX W HCPCS

## 2022-01-21 PROCEDURE — 6370000000 HC RX 637 (ALT 250 FOR IP): Performed by: FAMILY MEDICINE

## 2022-01-21 PROCEDURE — 82962 GLUCOSE BLOOD TEST: CPT

## 2022-01-21 PROCEDURE — 6370000000 HC RX 637 (ALT 250 FOR IP): Performed by: STUDENT IN AN ORGANIZED HEALTH CARE EDUCATION/TRAINING PROGRAM

## 2022-01-21 PROCEDURE — 99232 SBSQ HOSP IP/OBS MODERATE 35: CPT | Performed by: INTERNAL MEDICINE

## 2022-01-21 RX ORDER — INSULIN GLARGINE 100 [IU]/ML
3 INJECTION, SOLUTION SUBCUTANEOUS EVERY MORNING
Status: DISCONTINUED | OUTPATIENT
Start: 2022-01-22 | End: 2022-01-21 | Stop reason: CLARIF

## 2022-01-21 RX ORDER — ACETAMINOPHEN 325 MG/1
650 TABLET ORAL EVERY 4 HOURS PRN
Status: DISCONTINUED | OUTPATIENT
Start: 2022-01-21 | End: 2022-01-24

## 2022-01-21 RX ORDER — INSULIN GLARGINE-YFGN 100 [IU]/ML
3 INJECTION, SOLUTION SUBCUTANEOUS EVERY MORNING
Status: DISCONTINUED | OUTPATIENT
Start: 2022-01-22 | End: 2022-01-21

## 2022-01-21 RX ORDER — ERGOCALCIFEROL 1.25 MG/1
50000 CAPSULE ORAL
Status: DISCONTINUED | OUTPATIENT
Start: 2022-01-21 | End: 2022-01-28 | Stop reason: HOSPADM

## 2022-01-21 RX ORDER — INSULIN GLARGINE-YFGN 100 [IU]/ML
2 INJECTION, SOLUTION SUBCUTANEOUS EVERY MORNING
Status: DISCONTINUED | OUTPATIENT
Start: 2022-01-22 | End: 2022-01-24

## 2022-01-21 RX ORDER — INSULIN GLARGINE 100 [IU]/ML
2 INJECTION, SOLUTION SUBCUTANEOUS ONCE
Status: COMPLETED | OUTPATIENT
Start: 2022-01-21 | End: 2022-01-21

## 2022-01-21 RX ADMIN — PANTOPRAZOLE SODIUM 40 MG: 40 TABLET, DELAYED RELEASE ORAL at 05:26

## 2022-01-21 RX ADMIN — EPOETIN ALFA-EPBX 3000 UNITS: 3000 INJECTION, SOLUTION INTRAVENOUS; SUBCUTANEOUS at 11:16

## 2022-01-21 RX ADMIN — METOCLOPRAMIDE HYDROCHLORIDE 5 MG: 5 TABLET ORAL at 17:28

## 2022-01-21 RX ADMIN — METOCLOPRAMIDE HYDROCHLORIDE 5 MG: 5 TABLET ORAL at 05:26

## 2022-01-21 RX ADMIN — Medication 30 G: at 17:27

## 2022-01-21 RX ADMIN — LEVOTHYROXINE SODIUM 75 MCG: 0.03 TABLET ORAL at 05:25

## 2022-01-21 RX ADMIN — HYDROMORPHONE HYDROCHLORIDE 0.25 MG: 1 INJECTION, SOLUTION INTRAMUSCULAR; INTRAVENOUS; SUBCUTANEOUS at 17:34

## 2022-01-21 RX ADMIN — INSULIN GLARGINE 1 UNITS: 100 INJECTION, SOLUTION SUBCUTANEOUS at 09:15

## 2022-01-21 RX ADMIN — HEPARIN SODIUM 5000 UNITS: 10000 INJECTION INTRAVENOUS; SUBCUTANEOUS at 15:00

## 2022-01-21 RX ADMIN — HEPARIN SODIUM 5000 UNITS: 10000 INJECTION INTRAVENOUS; SUBCUTANEOUS at 05:34

## 2022-01-21 RX ADMIN — HYDROMORPHONE HYDROCHLORIDE 0.25 MG: 1 INJECTION, SOLUTION INTRAMUSCULAR; INTRAVENOUS; SUBCUTANEOUS at 21:40

## 2022-01-21 RX ADMIN — PANTOPRAZOLE SODIUM 40 MG: 40 TABLET, DELAYED RELEASE ORAL at 15:00

## 2022-01-21 RX ADMIN — ACETAMINOPHEN 650 MG: 325 TABLET ORAL at 11:14

## 2022-01-21 RX ADMIN — ARIPIPRAZOLE 5 MG: 5 TABLET ORAL at 21:39

## 2022-01-21 RX ADMIN — DEXTROSE MONOHYDRATE 12.5 G: 25 INJECTION, SOLUTION INTRAVENOUS at 17:50

## 2022-01-21 RX ADMIN — CHOLESTYRAMINE 4 G: 4 POWDER, FOR SUSPENSION ORAL at 21:39

## 2022-01-21 RX ADMIN — HYDROMORPHONE HYDROCHLORIDE 0.25 MG: 1 INJECTION, SOLUTION INTRAMUSCULAR; INTRAVENOUS; SUBCUTANEOUS at 08:07

## 2022-01-21 RX ADMIN — PREGABALIN 75 MG: 75 CAPSULE ORAL at 11:15

## 2022-01-21 RX ADMIN — METOCLOPRAMIDE HYDROCHLORIDE 5 MG: 5 TABLET ORAL at 11:15

## 2022-01-21 RX ADMIN — HEPARIN SODIUM 5000 UNITS: 10000 INJECTION INTRAVENOUS; SUBCUTANEOUS at 21:39

## 2022-01-21 RX ADMIN — INSULIN LISPRO 3 UNITS: 100 INJECTION, SOLUTION INTRAVENOUS; SUBCUTANEOUS at 06:58

## 2022-01-21 RX ADMIN — DEXTROSE MONOHYDRATE 100 ML/HR: 50 INJECTION, SOLUTION INTRAVENOUS at 18:41

## 2022-01-21 RX ADMIN — EPOETIN ALFA-EPBX 2000 UNITS: 2000 INJECTION, SOLUTION INTRAVENOUS; SUBCUTANEOUS at 11:48

## 2022-01-21 RX ADMIN — CHOLESTYRAMINE 4 G: 4 POWDER, FOR SUSPENSION ORAL at 11:15

## 2022-01-21 RX ADMIN — INSULIN LISPRO 1 UNITS: 100 INJECTION, SOLUTION INTRAVENOUS; SUBCUTANEOUS at 11:23

## 2022-01-21 RX ADMIN — ROPINIROLE 0.25 MG: 0.25 TABLET, FILM COATED ORAL at 21:39

## 2022-01-21 RX ADMIN — HYDROMORPHONE HYDROCHLORIDE 0.25 MG: 1 INJECTION, SOLUTION INTRAMUSCULAR; INTRAVENOUS; SUBCUTANEOUS at 12:15

## 2022-01-21 RX ADMIN — METOCLOPRAMIDE HYDROCHLORIDE 5 MG: 5 TABLET ORAL at 21:40

## 2022-01-21 RX ADMIN — INSULIN GLARGINE 2 UNITS: 100 INJECTION, SOLUTION SUBCUTANEOUS at 11:16

## 2022-01-21 RX ADMIN — MIRTAZAPINE 15 MG: 15 TABLET, FILM COATED ORAL at 21:40

## 2022-01-21 RX ADMIN — HYDROMORPHONE HYDROCHLORIDE 0.25 MG: 1 INJECTION, SOLUTION INTRAMUSCULAR; INTRAVENOUS; SUBCUTANEOUS at 03:37

## 2022-01-21 ASSESSMENT — PAIN SCALES - GENERAL
PAINLEVEL_OUTOF10: 0
PAINLEVEL_OUTOF10: 10
PAINLEVEL_OUTOF10: 8
PAINLEVEL_OUTOF10: 7
PAINLEVEL_OUTOF10: 6
PAINLEVEL_OUTOF10: 10

## 2022-01-21 ASSESSMENT — PAIN DESCRIPTION - PAIN TYPE
TYPE: ACUTE PAIN
TYPE: ACUTE PAIN

## 2022-01-21 ASSESSMENT — PAIN DESCRIPTION - LOCATION
LOCATION: ABDOMEN
LOCATION: ABDOMEN

## 2022-01-21 NOTE — PROGRESS NOTES
Hospitalist Progress Note      SYNOPSIS: Patient admitted on 2022 for DKA. Pt with ESRD    Ms. Andrei[address], a 36y.o. year old female  who  has a past medical history of Acute congestive heart failure, ESRD on Dialysis, Cardiac arrest, Depression, Diabetes mellitus, Diabetic gastroparesis associated with type 1 diabetes mellitus, Diabetic ketoacidosis with coma associated with type 1 diabetes mellitus, Diabetic polyneuropathy, Endocarditis, Hyperlipidemia, Hyperosmolar hyperglycemic state, Hypothyroidism, Iron deficiency anemia, multiple drug resistant organisms resistance, MRSA, Non compliance w medication regimen, Previous stillbirth or demise, antepartum, Seizure Severe pre-eclampsia in third trimester, Shock liver, and Valvular endocarditis.      Patient presented to the emergency with complaints of nausea and vomiting. She has vomited 7 times on day of admission. Not able to keep anything down. She  Has a history of recent COVID diagnosis. Laboratory studies reveal potassium 3.2, creatinine 4.0, anion gap 18, glucose 327, calcium 7.4, troponin 162, alk phos 204, beta hydroxybutyrate >4.50, hemoglobin 9.6. COVID-19 negative. pH 7.31. Patient was started on IV fluids and insulin infusion for DKA. Nephrology and Endocrinology were consulted. Pt underwent HD as well. Gap closed. Pt continues to have intermittent nausea    SUBJECTIVE:    Seen in dialysis unit  She is complaining of much worsening in her abdominal pain      Temp (24hrs), Av.4 °F (36.9 °C), Min:98.2 °F (36.8 °C), Max:98.7 °F (37.1 °C)    DIET: ADULT DIET; Clear Liquid; 4 carb choices (60 gm/meal)  ADULT ORAL NUTRITION SUPPLEMENT; Lunch, Dinner;  Other Oral Supplement; gelatein  CODE: Full Code    Intake/Output Summary (Last 24 hours) at 2022 1358  Last data filed at 2022 1024  Gross per 24 hour   Intake 300 ml   Output 1500 ml   Net -1200 ml       OBJECTIVE:    BP (!) 147/104   Pulse 114   Temp 98.4 °F (36.9 °C) (Oral)   Resp 18   Ht 5' 4\" (1.626 m)   Wt 158 lb 8.2 oz (71.9 kg)   SpO2 98%   BMI 27.21 kg/m²     General appearance: No apparent distress, appears stated age and cooperative. HEENT:  Conjunctivae/corneas clear. Neck: Supple. No jugular venous distention. Respiratory: Clear to auscultation bilaterally, normal respiratory effort  Cardiovascular: Regular rate rhythm, normal S1-S2  Abdomen: Soft, nontender, nondistended  Musculoskeletal: No clubbing, cyanosis, no bilateral lower extremity edema. Brisk capillary refill. Skin:  No rashes  on visible skin  Neurologic: awake, alert and following commands     ASSESSMENT:    Diabetic ketoacidosis  Hypokalemia  Elevated troponin in the setting of ESRD  End-stage renal disease on hemodialysis  Chronic anemia  Type 1 diabetes   Hypoglycemia.   History of Cardiac arrest  Diabetic gastroparesis  History of Endocarditis       PLAN:      Endocrine following -we will also follow-up outpatient  No further hypoglycemia  Because of worsening abdominal pain make diet clear liquid diet  Symptoms likely gastroparesis  Continue Dilaudid for pain      DISPOSITION:     Medications:  REVIEWED DAILY    Infusion Medications    dextrose       Scheduled Medications    insulin lispro  0-6 Units SubCUTAneous TID WC    insulin lispro  0-3 Units SubCUTAneous Nightly    [START ON 1/22/2022] insulin glargine-yfgn  3 Units SubCUTAneous QAM    epoetin nena-epbx  2,000 Units SubCUTAneous Once per day on Mon Wed Fri    And    epoetin nena-epbx  3,000 Units SubCUTAneous Once per day on Mon Wed Fri    metoclopramide  5 mg Oral 4x Daily AC & HS    cholestyramine  1 packet Oral BID    pregabalin  75 mg Oral Daily    ARIPiprazole  5 mg Oral Nightly    levothyroxine  75 mcg Oral Daily    mirtazapine  15 mg Oral Nightly    pantoprazole  40 mg Oral BID AC    rOPINIRole  0.25 mg Oral Nightly    heparin (porcine)  5,000 Units SubCUTAneous Q8H    influenza virus vaccine  0.5 mL IntraMUSCular Prior to discharge     PRN Meds: acetaminophen, HYDROmorphone, glucose, dextrose, glucagon (rDNA), dextrose, potassium chloride **OR** potassium alternative oral replacement **OR** potassium chloride, dextrose, polyethylene glycol    Labs:     Recent Labs     01/19/22  0739 01/19/22  1045   WBC 3.8*  --    HGB 6.2* 7.8*   HCT 19.7* 23.9*   PLT 89*  --        Recent Labs     01/19/22  0520 01/20/22  0313 01/21/22  0400    135 134   K 4.6 4.6 5.5*   CL 99 102 100   CO2 26 27 20*   BUN 9 6 11   CREATININE 3.3* 2.3* 3.4*   CALCIUM 7.9* 8.0* 7.4*   PHOS 2.2* 2.2* 3.4       No results for input(s): PROT, ALB, ALKPHOS, ALT, AST, BILITOT, AMYLASE, LIPASE in the last 72 hours. No results for input(s): INR in the last 72 hours. No results for input(s): Steve Anderson in the last 72 hours. Chronic labs:    Lab Results   Component Value Date    CHOL 95 01/14/2020    TRIG 82 01/14/2020    HDL 33 01/14/2020    LDLCALC 46 01/14/2020    TSH 5.000 (H) 11/24/2021    INR 1.1 12/08/2021    LABA1C 8.7 (H) 12/08/2021       Radiology: REVIEWED DAILY    +++++++++++++++++++++++++++++++++++++++++++++++++  Merlin Johnson MD  [address], [facility]  +++++++++++++++++++++++++++++++++++++++++++++++++  NOTE: This report was transcribed using voice recognition software. Every effort was made to ensure accuracy; however, inadvertent computerized transcription errors may be present.

## 2022-01-21 NOTE — PROGRESS NOTES
Patient currently in Dialysis, head to toe assessment and vital signs will be obtained upon her return to unit. Dilaudid and Lantus given why down in dialysis, the remaining medications will be administered on unit.      Efrain Card RN, BSN

## 2022-01-21 NOTE — PROGRESS NOTES
1/21/22 1740     Patient's POCT blood glucose reading 50. Patient alert,  Oriented, conversing with nursing staff and able to tolerate glucose gel. Patient offered a drink with the gel but she declined gel to be mixed with juice,  then 2 glucose gel tubed given and ingested by patient. 1/21/22 1800   Patient POCT blood glucose reading 50. Dextrose 50% 12.5g administered Patient alert, stating she does feel as if her sugar is low. Requesting pain medication. Informed patient she received dilaudid offered tylenol. Patient declined. 1/21/22 1830   Patient's POCT blood glucose reading 54. Dextrose 5% IV infusion started at 100ml/hr.     1/21/22 1900   Patient's POCT blood glucose reading 169. [email or web address] remaining infusing. Perfect serve message sent to Dr. Rose Carlos updating on patient's status. Orders for holding insulin, call physician if blood sugar crosses 200, and decrease the IV infusion to 75ml/hr. Orders placed.          Ulysses Rhyme RN, BSN

## 2022-01-21 NOTE — CARE COORDINATION
1/21/2022 - Nephrology and endocrinology following. Had HD done this am. K 5.5,  today. Continues to complain of nausea. PO reglan bid, questran bid, protonix bid. Diet advanced to regular with carb control yesterday. Pt does HD at home with the help of her mother. Discharge plan remains home when medically stable. Her family will provide transportation home. SW/CM will follow.

## 2022-01-21 NOTE — PROGRESS NOTES
Comprehensive Nutrition Assessment    Type and Reason for Visit:  Initial    Nutrition Recommendations/Plan: ADAT, Start Gelatein BID    Nutrition Assessment:  Pt adm w/ DKA. Hx CHF, DM, ESRD on HD, seizures, substance abuse. Noted N/V PTA. Will provide ONS and monitor    Malnutrition Assessment:  Malnutrition Status: At risk for malnutrition (Comment)    Context:  Acute Illness     Findings of the 6 clinical characteristics of malnutrition:  Energy Intake:  7 - 50% or less of estimated energy requirements for 5 or more days  Weight Loss:  Unable to assess (d/t fluid shifts on HD)     Body Fat Loss:  No significant body fat loss     Muscle Mass Loss:  No significant muscle mass loss    Fluid Accumulation:  No significant fluid accumulation     Strength:  Not Performed    Estimated Daily Nutrient Needs:  Energy (kcal):  ; Weight Used for Energy Requirements:  Admission     Protein (g):  ; Weight Used for Protein Requirements:  Admission (1.3-1.5)        Fluid (ml/day):  per renal management; Method Used for Fluid Requirements:  Standard Renal      Nutrition Related Findings:  AMS, soft abd, active BS, diarrhea, trace edema, +I/Os      Wounds:  None       Current Nutrition Therapies:    ADULT DIET; Clear Liquid; 4 carb choices (60 gm/meal)    Anthropometric Measures:  · Height: 5' 4\" (162.6 cm)  · Current Body Weight: 142 lb (64.4 kg) (adm wt, CBW elevated 2/2 fluids)   · Admission Body Weight: 142 lb (64.4 kg) (actual 11/17)    · Usual Body Weight:  (135-169# measured per EMR x1 year)     · Ideal Body Weight: 120 lbs; % Ideal Body Weight 118.3 %   · BMI: 24.4  · BMI Categories: Normal Weight (BMI 18.5-24. 9)       Nutrition Diagnosis:   · Inadequate oral intake related to inadequate protein-energy intake (2/2 DKA) as evidenced by intake 0-25%,poor intake prior to admission,nausea,vomiting      Nutrition Interventions:   Food and/or Nutrient Delivery:  Continue Current Diet,Start Oral Nutrition

## 2022-01-21 NOTE — PROGRESS NOTES
Department of Internal Medicine  Nephrology Progress Note    Events Reviewed. Patient seen on dialysis. SUBJECTIVE:  We are following Ms. Khadijah Buckner for ESRD on HD. Reports having abdominal pain.     PHYSICAL EXAM:      Vitals:    VITALS:  /60   Pulse 111   Temp 98.2 °F (36.8 °C)   Resp 16   Ht 5' 4\" (1.626 m)   Wt 161 lb 2.5 oz (73.1 kg)   SpO2 96%   BMI 27.66 kg/m²   24HR INTAKE/OUTPUT:    No intake or output data in the 24 hours ending 01/21/22 0952    Access: RIJ tunneled dialysis catheter  Constitutional:  Lethargic, but wakens to voice  HEENT:  PERRL, normocephalic, atraumatic  Respiratory:  CTA bilateral   Cardiovascular/Edema:  ST, RRR, no murmur gallop or rub  Gastrointestinal:  abd distended, c/o persistent abdominal pain  Neurologic:  Lethargic, awakens to voice, follows commands, no focal deficit  Skin:  Warm, dry, ecchymotic areas to abdomen    Scheduled Meds:   epoetin nena-epbx  2,000 Units SubCUTAneous Once per day on Mon Wed Fri    And    epoetin nena-epbx  3,000 Units SubCUTAneous Once per day on Mon Wed Fri    insulin glargine  1 Units SubCUTAneous QAM    metoclopramide  5 mg Oral 4x Daily AC & HS    cholestyramine  1 packet Oral BID    insulin lispro  0-4 Units SubCUTAneous TID WC    pregabalin  75 mg Oral Daily    ARIPiprazole  5 mg Oral Nightly    levothyroxine  75 mcg Oral Daily    mirtazapine  15 mg Oral Nightly    pantoprazole  40 mg Oral BID AC    rOPINIRole  0.25 mg Oral Nightly    heparin (porcine)  5,000 Units SubCUTAneous Q8H    influenza virus vaccine  0.5 mL IntraMUSCular Prior to discharge     Continuous Infusions:   dextrose       PRN Meds:.acetaminophen, HYDROmorphone, glucose, dextrose, glucagon (rDNA), dextrose, potassium chloride **OR** potassium alternative oral replacement **OR** potassium chloride, dextrose, polyethylene glycol    DATA:    CBC with Differential:    Lab Results   Component Value Date    WBC 3.8 01/19/2022    RBC 2.45 01/19/2022    HGB 7.8 01/19/2022    HCT 23.9 01/19/2022    PLT 89 01/19/2022    MCV 80.4 01/19/2022    MCH 25.3 01/19/2022    MCHC 31.5 01/19/2022    RDW 20.6 01/19/2022    NRBC 0.9 08/10/2020    SEGSPCT 67 06/27/2013    METASPCT 1.7 08/10/2020    LYMPHOPCT 33.0 01/19/2022    MONOPCT 1.7 01/19/2022    MYELOPCT 0.9 01/19/2022    BASOPCT 0.9 01/19/2022    MONOSABS 0.08 01/19/2022    LYMPHSABS 1.25 01/19/2022    EOSABS 0.00 01/19/2022    BASOSABS 0.03 01/19/2022     CMP:    Lab Results   Component Value Date     01/21/2022    K 5.5 01/21/2022    K 4.2 11/24/2021     01/21/2022    CO2 20 01/21/2022    BUN 11 01/21/2022    CREATININE 3.4 01/21/2022    GFRAA 19 01/21/2022    LABGLOM 19 01/21/2022    GLUCOSE 432 01/21/2022    GLUCOSE 130 05/18/2012    PROT 6.9 01/14/2022    LABALBU 3.4 01/14/2022    LABALBU 4.1 05/18/2012    CALCIUM 7.4 01/21/2022    BILITOT 0.4 01/14/2022    ALKPHOS 204 01/14/2022    AST 22 01/14/2022    ALT 14 01/14/2022     Magnesium:    Lab Results   Component Value Date    MG 1.7 01/21/2022     Phosphorus:    Lab Results   Component Value Date    PHOS 3.4 01/21/2022     Radiology Review:                Chest Xray 1/14/22   Suspect early bibasilar infiltrates. BRIEF SUMMARY OF INITIAL CONSULT:     Briefly, Miss Swapna Junior is a 36year-old female with a PMH of ESRD on home hemodialysis 5 days/wk, (initiated September 2021, Type I DM, poorly controlled with recurrent admissions for DKA, HTN, gastroparesis, ascites, infective endocarditis, cardiac arrest, hypothyroidism and depression. She was recently admitted earlier this month after testing positive for COVID 19. She presented to the ER on January 14th, 2022 with complaints of nausea and vomiting for one month and hyperglycemia. She states she has not been compliant with insulin therapy recently. In the ER she was found to be in DKA with blood sugar of 400 And beta hydroxy greater than 4.5.  Other pertinent lab work revealed sodium 132, chloride 92, anion gap 19, BUN 13, Creatinine 4.5, lactic acid 2.3. She was started on DKA protocol and IVFs in the ER. Renal is consulted for ESRD on HD and DKA. Problems Resolved:   · DKA,  beta hydroxy greater than 4.5. S/P insulin drip. Endocrinology following   · Hyponatremia, 2/2 decreased free water excretion from ESRD. 1L fluid restriction. · Hypokalemia, 2/2 GI losses from vomiting and poor oral intake  · Hypocalcemia, to obtain Vitamin D level. To Replace  · Hyponatremia, 2/2 decreased free water excretion from ESRD. 1L fluid restriction. Sodium levels improved. IMPRESSION/RECOMMENDATIONS:       1. ESRD on home hemodialysis 4 days/wk (per patient) via RIJ tunneled dialysis catheter. To continue HD MWF while inpatient. Having dialysis today. 2. HTN, on lopressor and amlodipine   3. Hypophosphatemia, 2/2 poor intake, levels improved  4. Vitamin D deficiency, continue ergocalciferol  5. 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 Anemia of CKD, iron level 40, iron saturation 77%, on alpha to 5,000 unit 3 times/wk, status post transfusion  7. Type I DM, poorly controlled with frequent readmissions for DKA  ------------------------------------------------------  8. Hx Covid 19, unvaccinated  9. Restless leg syndrome, on ropinirole  10. Depression, on Abilify  11. Hypothyroidism, on levothyroxine  12. Obtain ionized ionized  13.  History of gastroparesis, on metoclopramide, appreciated GI consult.   14. Nutrition, clear liquid diet     Plan:     · HD 3 times a week M-W-F   · Continue epoetin alpha 5,000 units 3 times/wk  · Ergocalciferol 50,000 units every 2 weeks  · Continue to monitor phosphorus levels  · Continue to monitor H&H       Electronically signed by Salma Laura MD on 1/21/2022 at 9:52 AM

## 2022-01-21 NOTE — PROGRESS NOTES
ENDOCRINOLOGY PROGRESS NOTE      Date of admission: 1/14/2022  Date of service: 1/21/2022  Admitting physician: Roxana Cameron DO   Primary Care Physician: Drake Casillas MD  Consultant physician: Amber Casanova MD     Reason for the consultation:  Uncontrolled DM,labile BG    History of Present Illness: The history is provided by the patient. Accuracy of the patient data is excellent    [address] is a very pleasant 36 y.o. old female with PMH of Type I DM, ESRD on PD,HTN,hypothyroidism, infective endocarditis and other listed below admitted to [facility] on 1/14/2022 because of N/V, abdominal pain and found to be in DKA. Endocrine service was consulted for DM management. Subjective   Seen and examined this AM, just finished dialysis.  BG was high this AM but she ate late last night     Point of care glucose monitoring (Independently reviewed)   Recent Labs     01/19/22  2113 01/20/22  0939 01/20/22  1105 01/20/22  1635 01/21/22  0520 01/21/22  0807 01/21/22  1123 01/21/22  1726   GLUMET 129* 201* 208* 117* 398* 290* 155* 50*       Scheduled Meds:   vitamin D  50,000 Units Oral Q14 Days    [START ON 1/22/2022] insulin glargine  2 Units SubCUTAneous QAM    insulin lispro  0-4 Units SubCUTAneous 4x Daily AC & HS    epoetin nena-epbx  2,000 Units SubCUTAneous Once per day on Mon Wed Fri    And    epoetin nena-epbx  3,000 Units SubCUTAneous Once per day on Mon Wed Fri    metoclopramide  5 mg Oral 4x Daily AC & HS    cholestyramine  1 packet Oral BID    pregabalin  75 mg Oral Daily    ARIPiprazole  5 mg Oral Nightly    levothyroxine  75 mcg Oral Daily    mirtazapine  15 mg Oral Nightly    pantoprazole  40 mg Oral BID AC    rOPINIRole  0.25 mg Oral Nightly    heparin (porcine)  5,000 Units SubCUTAneous Q8H    influenza virus vaccine  0.5 mL IntraMUSCular Prior to discharge     PRN Meds:   acetaminophen, 650 mg, Q4H PRN  HYDROmorphone, 0.25 mg, Q4H PRN  glucose, 15 g, PRN  dextrose, 12.5 g, PRN  glucagon (rDNA), 1 mg, PRN  dextrose, 100 mL/hr, PRN  potassium chloride, 40 mEq, PRN   Or  potassium alternative oral replacement, 40 mEq, PRN   Or  potassium chloride, 10 mEq, PRN  dextrose, 12.5 g, PRN  polyethylene glycol, 17 g, Daily PRN      Continuous Infusions:   dextrose         Review of Systems  All systems reviewed. All negative except for symptoms mentioned in HPI     OBJECTIVE    /71   Pulse 100   Temp 98.1 °F (36.7 °C) (Oral)   Resp 16   Ht 5' 4\" (1.626 m)   Wt 158 lb 8.2 oz (71.9 kg)   SpO2 98%   BMI 27.21 kg/m²     Intake/Output Summary (Last 24 hours) at 1/21/2022 1814  Last data filed at 1/21/2022 1024  Gross per 24 hour   Intake 300 ml   Output 1500 ml   Net -1200 ml       Physical examination:   General: awake alert, oriented x3, no abnormal position or movements. HEENT: normocephalic non-traumatic, no exophthalmos   Neck: supple  Pulm: Clear equal air entry no added sounds, no wheezing or rhonchi    Chest: Vascular access Rt. Chest. Lt chest scar. CVS: S1 + S2, no murmur  Abd: soft lax, nontender. Normal bowel sounds  Skin: warm, no lesions, no rash.     Neuro: CN intact, muscle power normal  Psych: normal mood, and affect    Review of Laboratory Data:  I personally reviewed the following labs:   Recent Labs     01/19/22  0739 01/19/22  1045   WBC 3.8*  --    RBC 2.45*  --    HGB 6.2* 7.8*   HCT 19.7* 23.9*   MCV 80.4  --    MCH 25.3*  --    MCHC 31.5*  --    RDW 20.6*  --    PLT 89*  --    MPV 10.9  --      Recent Labs     01/19/22  0520 01/20/22  0313 01/21/22  0400    135 134   K 4.6 4.6 5.5*   CL 99 102 100   CO2 26 27 20*   BUN 9 6 11   CREATININE 3.3* 2.3* 3.4*   GLUCOSE 243* 143* 432*   CALCIUM 7.9* 8.0* 7.4*     Beta-Hydroxybutyrate   Date Value Ref Range Status   01/14/2022 >4.50 (H) 0.02 - 0.27 mmol/L Final   12/31/2021 >4.50 (H) 0.02 - 0.27 mmol/L Final   11/22/2021 0.53 (H) 0.02 - 0.27 mmol/L Final     Lab Results   Component Value Date    LABA1C 8.7 12/08/2021    LABA1C 10.2 11/23/2021    LABA1C 10.6 09/28/2021     Lab Results   Component Value Date/Time    TSH 5.000 (H) 11/24/2021 08:52 AM    T4FREE 1.16 11/24/2021 08:52 AM    Z1FWWNY 8.6 11/05/2015 02:20 AM    FT3 1.3 (L) 10/31/2020 10:43 AM    W3YQFPT 36.73 (L) 07/20/2020 02:00 PM     Lab Results   Component Value Date    LABA1C 8.7 12/08/2021    GLUCOSE 432 01/21/2022    GLUCOSE 130 05/18/2012    MALBCR 2949.3 01/15/2020    LABMICR 1740.1 01/15/2020    LABCREA 59 01/15/2020    LABCREA 61 01/15/2020     Lab Results   Component Value Date    TRIG 82 01/14/2020    HDL 33 01/14/2020    LDLCALC 46 01/14/2020    CHOL 95 01/14/2020       Blood culture   Lab Results   Component Value Date    BC 5 Days no growth 01/01/2022    BC 5 Days no growth 11/22/2021       Radiology:  XR CHEST PORTABLE   Final Result   Suspect early bibasilar infiltrates. Medical Records/Labs/Images review:   I personally reviewed and summarized previous records   All labs and imaging were reviewed independently     [address], a 36 y.o.-old female seen today for inpatient diabetes management     Diabetes Mellitus type I    · Extremely insulin sensitive   · Pt with DM type 1, need long acting insulin all the time to prevent DKA   · Will adjust  diabetes regimen to    · Lantus to 2 (two) unit daily in AM   · modified Low dose sliding scale 1:80>180 with meals and at night    · Continue glucose check with meals and at bedtime   · Will titrate insulin dose based on the blood glucose trend & insulin requirement  · We will arrange for the patient to follow with us after discharge    Primary Hypothyroidism  · On levothyroxine 75 mcg daily.  She was missing doses   · To recheck TFT 6-8 weeks after discharge     N/V/ Diarrhea  · Resolved  · Symptomatic Management per primary and GI services    Acute on Chronic Anemia  · S/p 1U PRBC transfusion    ESRD  · Pt at risk for hypoglycemia  · On dialysis, nephrology following    The above issues were reviewed with the patient who understood and agreed with the plan. Thank you for allowing us to participate in the care of this patient. Please do not hesitate to contact us with any additional questions. Electronically signed by Scar Almonte MD on 1/21/2022 at [facility] MD  Endocrinologist, [facility]   [address] [place] [id number]   Phone: [phone]  Fax: [phone]  ----------------------------  An electronic signature was used to authenticate this note.  Scar Almonte MD on 1/21/2022 at 6:14 PM

## 2022-01-22 ENCOUNTER — APPOINTMENT (OUTPATIENT)
Dept: CT IMAGING | Age: 32
DRG: 420 | End: 2022-01-22
Payer: COMMERCIAL

## 2022-01-22 ENCOUNTER — APPOINTMENT (OUTPATIENT)
Dept: GENERAL RADIOLOGY | Age: 32
DRG: 420 | End: 2022-01-22
Payer: COMMERCIAL

## 2022-01-22 LAB
ALBUMIN SERPL-MCNC: 2.5 G/DL (ref 3.5–5.2)
ALP BLD-CCNC: 177 U/L (ref 35–104)
ALT SERPL-CCNC: 9 U/L (ref 0–32)
ANION GAP SERPL CALCULATED.3IONS-SCNC: 11 MMOL/L (ref 7–16)
ANION GAP SERPL CALCULATED.3IONS-SCNC: 7 MMOL/L (ref 7–16)
ANISOCYTOSIS: ABNORMAL
AST SERPL-CCNC: 18 U/L (ref 0–31)
B.E.: 0.5 MMOL/L (ref -3–3)
BASOPHILS ABSOLUTE: 0.04 E9/L (ref 0–0.2)
BASOPHILS RELATIVE PERCENT: 0.7 % (ref 0–2)
BILIRUB SERPL-MCNC: 0.3 MG/DL (ref 0–1.2)
BUN BLDV-MCNC: 12 MG/DL (ref 6–20)
BUN BLDV-MCNC: 13 MG/DL (ref 6–20)
CALCIUM IONIZED: 1.11 MMOL/L (ref 1.15–1.33)
CALCIUM SERPL-MCNC: 7.4 MG/DL (ref 8.6–10.2)
CALCIUM SERPL-MCNC: 7.8 MG/DL (ref 8.6–10.2)
CHLORIDE BLD-SCNC: 100 MMOL/L (ref 98–107)
CHLORIDE BLD-SCNC: 102 MMOL/L (ref 98–107)
CO2: 21 MMOL/L (ref 22–29)
CO2: 25 MMOL/L (ref 22–29)
COHB: 1.1 % (ref 0–1.5)
CREAT SERPL-MCNC: 2.8 MG/DL (ref 0.5–1)
CREAT SERPL-MCNC: 2.9 MG/DL (ref 0.5–1)
CRITICAL: ABNORMAL
DATE ANALYZED: ABNORMAL
DATE OF COLLECTION: ABNORMAL
EOSINOPHILS ABSOLUTE: 0.07 E9/L (ref 0.05–0.5)
EOSINOPHILS RELATIVE PERCENT: 1.2 % (ref 0–6)
GFR AFRICAN AMERICAN: 23
GFR AFRICAN AMERICAN: 24
GFR NON-AFRICAN AMERICAN: 23 ML/MIN/1.73
GFR NON-AFRICAN AMERICAN: 24 ML/MIN/1.73
GLUCOSE BLD-MCNC: 144 MG/DL (ref 74–99)
GLUCOSE BLD-MCNC: 81 MG/DL (ref 74–99)
HCO3: 25.2 MMOL/L (ref 22–26)
HCT VFR BLD CALC: 23.1 % (ref 34–48)
HEMOGLOBIN: 7.4 G/DL (ref 11.5–15.5)
HHB: 0.4 % (ref 0–5)
HYPOCHROMIA: ABNORMAL
IMMATURE GRANULOCYTES #: 0.03 E9/L
IMMATURE GRANULOCYTES %: 0.5 % (ref 0–5)
LAB: ABNORMAL
LACTIC ACID: 2.6 MMOL/L (ref 0.5–2.2)
LYMPHOCYTES ABSOLUTE: 1.75 E9/L (ref 1.5–4)
LYMPHOCYTES RELATIVE PERCENT: 29.3 % (ref 20–42)
Lab: ABNORMAL
MAGNESIUM: 1.5 MG/DL (ref 1.6–2.6)
MAGNESIUM: 1.7 MG/DL (ref 1.6–2.6)
MCH RBC QN AUTO: 25.7 PG (ref 26–35)
MCHC RBC AUTO-ENTMCNC: 32 % (ref 32–34.5)
MCV RBC AUTO: 80.2 FL (ref 80–99.9)
METER GLUCOSE: 109 MG/DL (ref 74–99)
METER GLUCOSE: 159 MG/DL (ref 74–99)
METER GLUCOSE: 212 MG/DL (ref 74–99)
METER GLUCOSE: 318 MG/DL (ref 74–99)
METER GLUCOSE: 45 MG/DL (ref 74–99)
METER GLUCOSE: 71 MG/DL (ref 74–99)
METER GLUCOSE: 93 MG/DL (ref 74–99)
METER GLUCOSE: 99 MG/DL (ref 74–99)
METER GLUCOSE: <40 MG/DL (ref 74–99)
METHB: 0.4 % (ref 0–1.5)
MODE: ABNORMAL
MONOCYTES ABSOLUTE: 0.39 E9/L (ref 0.1–0.95)
MONOCYTES RELATIVE PERCENT: 6.5 % (ref 2–12)
NEUTROPHILS ABSOLUTE: 3.7 E9/L (ref 1.8–7.3)
NEUTROPHILS RELATIVE PERCENT: 61.8 % (ref 43–80)
O2 CONTENT: 12.5 ML/DL
O2 SATURATION: 99.6 % (ref 92–98.5)
O2HB: 98.1 % (ref 94–97)
OPERATOR ID: 1741
OVALOCYTES: ABNORMAL
PATIENT TEMP: 37 C
PCO2: 40.8 MMHG (ref 35–45)
PDW BLD-RTO: 20.1 FL (ref 11.5–15)
PH BLOOD GAS: 7.41 (ref 7.35–7.45)
PHOSPHORUS: 2.1 MG/DL (ref 2.5–4.5)
PHOSPHORUS: 2.3 MG/DL (ref 2.5–4.5)
PLATELET # BLD: 97 E9/L (ref 130–450)
PLATELET CONFIRMATION: NORMAL
PMV BLD AUTO: 11 FL (ref 7–12)
PO2: 260.4 MMHG (ref 75–100)
POIKILOCYTES: ABNORMAL
POLYCHROMASIA: ABNORMAL
POTASSIUM SERPL-SCNC: 4.27 MMOL/L (ref 3.5–5)
POTASSIUM SERPL-SCNC: 4.4 MMOL/L (ref 3.5–5)
POTASSIUM SERPL-SCNC: 5.6 MMOL/L (ref 3.5–5)
PROLACTIN: 31.48 NG/ML
RBC # BLD: 2.88 E12/L (ref 3.5–5.5)
SODIUM BLD-SCNC: 132 MMOL/L (ref 132–146)
SODIUM BLD-SCNC: 134 MMOL/L (ref 132–146)
SOURCE, BLOOD GAS: ABNORMAL
TARGET CELLS: ABNORMAL
TEAR DROP CELLS: ABNORMAL
THB: 8.6 G/DL (ref 11.5–16.5)
TIME ANALYZED: 1635
TOTAL PROTEIN: 5.4 G/DL (ref 6.4–8.3)
TROPONIN, HIGH SENSITIVITY: 169 NG/L (ref 0–9)
WBC # BLD: 6 E9/L (ref 4.5–11.5)

## 2022-01-22 PROCEDURE — 84132 ASSAY OF SERUM POTASSIUM: CPT

## 2022-01-22 PROCEDURE — 6360000002 HC RX W HCPCS: Performed by: NURSE PRACTITIONER

## 2022-01-22 PROCEDURE — 83605 ASSAY OF LACTIC ACID: CPT

## 2022-01-22 PROCEDURE — 36415 COLL VENOUS BLD VENIPUNCTURE: CPT

## 2022-01-22 PROCEDURE — 2580000003 HC RX 258: Performed by: INTERNAL MEDICINE

## 2022-01-22 PROCEDURE — 80048 BASIC METABOLIC PNL TOTAL CA: CPT

## 2022-01-22 PROCEDURE — S5553 INSULIN LONG ACTING 5 U: HCPCS | Performed by: INTERNAL MEDICINE

## 2022-01-22 PROCEDURE — 6360000002 HC RX W HCPCS: Performed by: FAMILY MEDICINE

## 2022-01-22 PROCEDURE — 82962 GLUCOSE BLOOD TEST: CPT

## 2022-01-22 PROCEDURE — 6370000000 HC RX 637 (ALT 250 FOR IP): Performed by: INTERNAL MEDICINE

## 2022-01-22 PROCEDURE — 80053 COMPREHEN METABOLIC PANEL: CPT

## 2022-01-22 PROCEDURE — 2580000003 HC RX 258: Performed by: NURSE PRACTITIONER

## 2022-01-22 PROCEDURE — 6360000002 HC RX W HCPCS: Performed by: INTERNAL MEDICINE

## 2022-01-22 PROCEDURE — 70450 CT HEAD/BRAIN W/O DYE: CPT

## 2022-01-22 PROCEDURE — 84100 ASSAY OF PHOSPHORUS: CPT

## 2022-01-22 PROCEDURE — 82805 BLOOD GASES W/O2 SATURATION: CPT

## 2022-01-22 PROCEDURE — 6360000002 HC RX W HCPCS

## 2022-01-22 PROCEDURE — 84484 ASSAY OF TROPONIN QUANT: CPT

## 2022-01-22 PROCEDURE — 2500000003 HC RX 250 WO HCPCS: Performed by: EMERGENCY MEDICINE

## 2022-01-22 PROCEDURE — 71045 X-RAY EXAM CHEST 1 VIEW: CPT

## 2022-01-22 PROCEDURE — 2500000003 HC RX 250 WO HCPCS: Performed by: FAMILY MEDICINE

## 2022-01-22 PROCEDURE — 99232 SBSQ HOSP IP/OBS MODERATE 35: CPT | Performed by: INTERNAL MEDICINE

## 2022-01-22 PROCEDURE — 82330 ASSAY OF CALCIUM: CPT

## 2022-01-22 PROCEDURE — 6370000000 HC RX 637 (ALT 250 FOR IP): Performed by: FAMILY MEDICINE

## 2022-01-22 PROCEDURE — 6370000000 HC RX 637 (ALT 250 FOR IP): Performed by: STUDENT IN AN ORGANIZED HEALTH CARE EDUCATION/TRAINING PROGRAM

## 2022-01-22 PROCEDURE — 93005 ELECTROCARDIOGRAM TRACING: CPT | Performed by: STUDENT IN AN ORGANIZED HEALTH CARE EDUCATION/TRAINING PROGRAM

## 2022-01-22 PROCEDURE — 85025 COMPLETE CBC W/AUTO DIFF WBC: CPT

## 2022-01-22 PROCEDURE — 2060000000 HC ICU INTERMEDIATE R&B

## 2022-01-22 PROCEDURE — 83735 ASSAY OF MAGNESIUM: CPT

## 2022-01-22 PROCEDURE — 84146 ASSAY OF PROLACTIN: CPT

## 2022-01-22 RX ORDER — HEPARIN SODIUM 1000 [USP'U]/ML
INJECTION, SOLUTION INTRAVENOUS; SUBCUTANEOUS
Status: COMPLETED
Start: 2022-01-22 | End: 2022-01-22

## 2022-01-22 RX ORDER — DEXTROSE MONOHYDRATE 50 MG/ML
INJECTION, SOLUTION INTRAVENOUS CONTINUOUS
Status: ACTIVE | OUTPATIENT
Start: 2022-01-22 | End: 2022-01-22

## 2022-01-22 RX ADMIN — ACETAMINOPHEN 650 MG: 325 TABLET ORAL at 08:40

## 2022-01-22 RX ADMIN — METOCLOPRAMIDE HYDROCHLORIDE 5 MG: 5 TABLET ORAL at 12:09

## 2022-01-22 RX ADMIN — DEXTROSE MONOHYDRATE: 50 INJECTION, SOLUTION INTRAVENOUS at 17:42

## 2022-01-22 RX ADMIN — HYDROMORPHONE HYDROCHLORIDE 0.25 MG: 1 INJECTION, SOLUTION INTRAMUSCULAR; INTRAVENOUS; SUBCUTANEOUS at 22:10

## 2022-01-22 RX ADMIN — DEXTROSE MONOHYDRATE 12.5 G: 25 INJECTION, SOLUTION INTRAVENOUS at 19:03

## 2022-01-22 RX ADMIN — CHOLESTYRAMINE 4 G: 4 POWDER, FOR SUSPENSION ORAL at 08:40

## 2022-01-22 RX ADMIN — GLUCAGON HYDROCHLORIDE 1 MG: KIT at 06:27

## 2022-01-22 RX ADMIN — HEPARIN SODIUM 2200 UNITS: 1000 INJECTION, SOLUTION INTRAVENOUS; SUBCUTANEOUS at 13:27

## 2022-01-22 RX ADMIN — METOCLOPRAMIDE HYDROCHLORIDE 5 MG: 5 TABLET ORAL at 06:27

## 2022-01-22 RX ADMIN — HEPARIN SODIUM 5000 UNITS: 10000 INJECTION INTRAVENOUS; SUBCUTANEOUS at 06:27

## 2022-01-22 RX ADMIN — PREGABALIN 75 MG: 75 CAPSULE ORAL at 08:40

## 2022-01-22 RX ADMIN — INSULIN LISPRO 2 UNITS: 100 INJECTION, SOLUTION INTRAVENOUS; SUBCUTANEOUS at 13:12

## 2022-01-22 RX ADMIN — PANTOPRAZOLE SODIUM 40 MG: 40 TABLET, DELAYED RELEASE ORAL at 06:27

## 2022-01-22 RX ADMIN — HYDROMORPHONE HYDROCHLORIDE 0.25 MG: 1 INJECTION, SOLUTION INTRAMUSCULAR; INTRAVENOUS; SUBCUTANEOUS at 05:20

## 2022-01-22 RX ADMIN — INSULIN GLARGINE-YFGN 2 UNITS: 100 INJECTION, SOLUTION SUBCUTANEOUS at 08:41

## 2022-01-22 RX ADMIN — CALCIUM GLUCONATE 1000 MG: 98 INJECTION, SOLUTION INTRAVENOUS at 17:43

## 2022-01-22 RX ADMIN — HYDROMORPHONE HYDROCHLORIDE 0.25 MG: 1 INJECTION, SOLUTION INTRAMUSCULAR; INTRAVENOUS; SUBCUTANEOUS at 09:56

## 2022-01-22 ASSESSMENT — PAIN DESCRIPTION - FREQUENCY
FREQUENCY: CONTINUOUS
FREQUENCY: CONTINUOUS

## 2022-01-22 ASSESSMENT — PAIN SCALES - GENERAL
PAINLEVEL_OUTOF10: 8
PAINLEVEL_OUTOF10: 8
PAINLEVEL_OUTOF10: 9
PAINLEVEL_OUTOF10: 9
PAINLEVEL_OUTOF10: 8

## 2022-01-22 ASSESSMENT — PAIN DESCRIPTION - LOCATION
LOCATION: ABDOMEN
LOCATION: ABDOMEN

## 2022-01-22 ASSESSMENT — PAIN DESCRIPTION - PAIN TYPE
TYPE: ACUTE PAIN
TYPE: ACUTE PAIN

## 2022-01-22 ASSESSMENT — PAIN DESCRIPTION - DESCRIPTORS
DESCRIPTORS: SHARP;ACHING
DESCRIPTORS: SHARP;ACHING

## 2022-01-22 NOTE — PROGRESS NOTES
Department of Internal Medicine  Nephrology Progress Note    Events Reviewed. SUBJECTIVE:  We are following Ms. Khadijah Buckner for ESRD on HD. Reports no complaints.     PHYSICAL EXAM:      Vitals:    VITALS:  BP (!) 132/93   Pulse 103   Temp 97.1 °F (36.2 °C) (Temporal)   Resp 16   Ht 5' 4\" (1.626 m)   Wt 158 lb 14.4 oz (72.1 kg)   SpO2 98%   BMI 27.28 kg/m²   24HR INTAKE/OUTPUT:    No intake or output data in the 24 hours ending 01/22/22 1037    Access: RIJ tunneled dialysis catheter  Constitutional:  Lethargic, but awakens to voice  HEENT:  PERRL, normocephalic, atraumatic  Respiratory:  CTA bilaterally  Cardiovascular/Edema:  ST, RRR, no murmur gallop or rub  Gastrointestinal:  abd distended, c/o persistent abdominal pain  Neurologic:  Lethargic, awakens to voice, follows commands, no focal deficit  Skin:  Warm, dry, ecchymotic areas to abdomen    Scheduled Meds:   vitamin D  50,000 Units Oral Q14 Days    insulin glargine-yfgn  2 Units SubCUTAneous QAM    insulin lispro  0-4 Units SubCUTAneous 4x Daily AC & HS    epoetin nena-epbx  2,000 Units SubCUTAneous Once per day on Mon Wed Fri    And    epoetin nena-epbx  3,000 Units SubCUTAneous Once per day on Mon Wed Fri    metoclopramide  5 mg Oral 4x Daily AC & HS    cholestyramine  1 packet Oral BID    pregabalin  75 mg Oral Daily    ARIPiprazole  5 mg Oral Nightly    levothyroxine  75 mcg Oral Daily    mirtazapine  15 mg Oral Nightly    pantoprazole  40 mg Oral BID AC    rOPINIRole  0.25 mg Oral Nightly    heparin (porcine)  5,000 Units SubCUTAneous Q8H    influenza virus vaccine  0.5 mL IntraMUSCular Prior to discharge     Continuous Infusions:   dextrose 75 mL/hr (01/21/22 1912)     PRN Meds:.acetaminophen, HYDROmorphone, glucose, dextrose, glucagon (rDNA), dextrose, potassium chloride **OR** potassium alternative oral replacement **OR** potassium chloride, dextrose, polyethylene glycol    DATA:    CBC with Differential:    Lab Results Component Value Date    WBC 3.8 01/19/2022    RBC 2.45 01/19/2022    HGB 7.8 01/19/2022    HCT 23.9 01/19/2022    PLT 89 01/19/2022    MCV 80.4 01/19/2022    MCH 25.3 01/19/2022    MCHC 31.5 01/19/2022    RDW 20.6 01/19/2022    NRBC 0.9 08/10/2020    SEGSPCT 67 06/27/2013    METASPCT 1.7 08/10/2020    LYMPHOPCT 33.0 01/19/2022    MONOPCT 1.7 01/19/2022    MYELOPCT 0.9 01/19/2022    BASOPCT 0.9 01/19/2022    MONOSABS 0.08 01/19/2022    LYMPHSABS 1.25 01/19/2022    EOSABS 0.00 01/19/2022    BASOSABS 0.03 01/19/2022     CMP:    Lab Results   Component Value Date     01/21/2022    K 5.5 01/21/2022    K 4.2 11/24/2021     01/21/2022    CO2 20 01/21/2022    BUN 11 01/21/2022    CREATININE 3.4 01/21/2022    GFRAA 19 01/21/2022    LABGLOM 19 01/21/2022    GLUCOSE 432 01/21/2022    GLUCOSE 130 05/18/2012    PROT 6.9 01/14/2022    LABALBU 3.4 01/14/2022    LABALBU 4.1 05/18/2012    CALCIUM 7.4 01/21/2022    BILITOT 0.4 01/14/2022    ALKPHOS 204 01/14/2022    AST 22 01/14/2022    ALT 14 01/14/2022     Magnesium:    Lab Results   Component Value Date    MG 1.7 01/21/2022     Phosphorus:    Lab Results   Component Value Date    PHOS 3.4 01/21/2022     Radiology Review:                Chest Xray 1/14/22   Suspect early bibasilar infiltrates. BRIEF SUMMARY OF INITIAL CONSULT:     Briefly, Miss Brandon Tavares is a 36year-old female with a PMH of ESRD on home hemodialysis 5 days/wk, (initiated September 2021, Type I DM, poorly controlled with recurrent admissions for DKA, HTN, gastroparesis, ascites, infective endocarditis, cardiac arrest, hypothyroidism and depression. She was recently admitted earlier this month after testing positive for COVID 19. She presented to the ER on January 14th, 2022 with complaints of nausea and vomiting for one month and hyperglycemia. She states she has not been compliant with insulin therapy recently.  In the ER she was found to be in DKA with blood sugar of 400 And beta hydroxy greater than 4.5. Other pertinent lab work revealed sodium 132, chloride 92, anion gap 19, BUN 13, Creatinine 4.5, lactic acid 2.3. She was started on DKA protocol and IVFs in the ER. Renal is consulted for ESRD on HD and DKA. Problems Resolved:   · DKA,  beta hydroxy greater than 4.5. S/P insulin drip. Endocrinology following   · Hyponatremia, 2/2 decreased free water excretion from ESRD. 1L fluid restriction. · Hypokalemia, 2/2 GI losses from vomiting and poor oral intake  · Hypocalcemia, to obtain Vitamin D level. To Replace  · Hyponatremia, 2/2 decreased free water excretion from ESRD. 1L fluid restriction. Sodium levels improved. IMPRESSION/RECOMMENDATIONS:       1. ESRD on home hemodialysis 4 days/wk (per patient) via RIJ tunneled dialysis catheter. To continue HD MWF while inpatient. 2. HTN, on lopressor and amlodipine   3. Hypophosphatemia, 2/2 poor intake, levels improved  4. Vitamin D deficiency, continue ergocalciferol  5. 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 Anemia of CKD, iron level 40, iron saturation 77%, on alpha to 5,000 unit 3 times/wk, status post transfusion  7. Type I DM, poorly controlled with frequent readmissions for DKA  ------------------------------------------------------  8. Hx Covid 19, unvaccinated  9. Restless leg syndrome, on ropinirole  10. Depression, on Abilify  11. Hypothyroidism, on levothyroxine  12. Obtain ionized ionized  13.  History of gastroparesis, on metoclopramide, appreciate GI consult.   14. Nutrition, clear liquid diet     Plan:     · Obtain BMP  · HD 3 times a week M-W-F   · Continue epoetin alpha 5,000 units 3 times/wk  · Continue ergocalciferol 50,000 units every 2 weeks  · Continue to monitor phosphorus levels  · Continue to monitor H&H       Electronically signed by HEBER Wolf CNP on 1/22/2022 at 10:37 AM

## 2022-01-22 NOTE — SIGNIFICANT EVENT
Critical Care - Rapid Response Team Note      Date of event: 1/22/2022   Time of event: [address] 36y.o. year old female   YOB: 1990     PCP:  Remington Garcia MD   Location: [address] [place] [address]   Witnessed? : [x]Yes  [] No  Initial Code status: [x] Full  [] DNR-CCA  []DNR-CC    []Limited  ______________________________________________________________________  Reason for RRT:    Other: less responsive and shaking    Chief Complaint for this admission:   Chief Complaint   Patient presents with    Emesis       Admit date:  1/14/2022     Admitting Diagnosis: DKA, type 1, not at goal [facility]) [E10.10]  Type 1 diabetes mellitus with ketoacidosis without coma ([facility] [E10.10]  Nausea and vomiting, intractability of vomiting not specified, unspecified vomiting type [R11.2]      Current Diagnosis: The primary encounter diagnosis was Nausea and vomiting, intractability of vomiting not specified, unspecified vomiting type. A diagnosis of Type 1 diabetes mellitus with ketoacidosis without coma ([facility] was also pertinent to this visit. Subjective: RRT called for less responsiveness. The team arrives with pt sugar level unreadable, give 1 amp of dextrose, pt vital sign stable, ABG unremarkable, EKG sinus tachy. Sent labs,PE is remarkable for b/l dilated pupil. CT head ordered, wheeled pt down, scan is unremarkable for acute intracranial abnormality. On the way back, pt becomes more responsive. Reexamination in the room. Pt AO, follow commands, pupil becomes symmetric and size ~ 3mm normalized.     Labs since last 72 hours:   CBC with Differential:    Lab Results   Component Value Date    WBC 6.0 01/22/2022    RBC 2.88 01/22/2022    HGB 7.4 01/22/2022    HCT 23.1 01/22/2022    PLT 97 01/22/2022    MCV 80.2 01/22/2022    MCH 25.7 01/22/2022    MCHC 32.0 01/22/2022    RDW 20.1 01/22/2022    NRBC 0.9 08/10/2020    SEGSPCT 67 06/27/2013    METASPCT 1.7 08/10/2020    LYMPHOPCT 33.0 01/19/2022    MONOPCT 1.7 01/19/2022    MYELOPCT 0.9 01/19/2022    BASOPCT 0.9 01/19/2022    MONOSABS 0.08 01/19/2022    LYMPHSABS 1.25 01/19/2022    EOSABS 0.00 01/19/2022    BASOSABS 0.03 01/19/2022     CMP:    Lab Results   Component Value Date     01/22/2022    K 4.27 01/22/2022    K 4.2 11/24/2021     01/22/2022    CO2 25 01/22/2022    BUN 13 01/22/2022    CREATININE 2.9 01/22/2022    GFRAA 23 01/22/2022    LABGLOM 23 01/22/2022    GLUCOSE 144 01/22/2022    GLUCOSE 130 05/18/2012    PROT 5.4 01/22/2022    LABALBU 2.5 01/22/2022    LABALBU 4.1 05/18/2012    CALCIUM 7.4 01/22/2022    BILITOT 0.3 01/22/2022    ALKPHOS 177 01/22/2022    AST 18 01/22/2022    ALT 9 01/22/2022     BMP:    Lab Results   Component Value Date     01/22/2022    K 4.27 01/22/2022    K 4.2 11/24/2021     01/22/2022    CO2 25 01/22/2022    BUN 13 01/22/2022    LABALBU 2.5 01/22/2022    LABALBU 4.1 05/18/2012    CREATININE 2.9 01/22/2022    CALCIUM 7.4 01/22/2022    GFRAA 23 01/22/2022    LABGLOM 23 01/22/2022    GLUCOSE 144 01/22/2022    GLUCOSE 130 05/18/2012         Imaging since last 7 days:  CT HEAD WO CONTRAST    Result Date: 1/22/2022  No acute intracranial abnormality.          Objective:   Initial Assessment on arrival:  Vital signs: Temp: 96.8, BP: 125/97, RR: 16, HR: 113 SpO2:100%    Airway and Condition: Conscious, Pulse present, Breathing and Airway Open/Clear noted    Lungs And Circulation: clear to auscultation bilaterally and sinus rhythm noted                  Neurologic: responds to pain and does not follow commands b/l dilated pupil, sluggish noted    Initial Interventions: Labs ordered: POCT BGm cbc, bmp, mag, phos, LA, troponin, ABG, prolactin, B12 and B9, TSH, ammonia  Imaging ordered: CT head  Meds/Fluids/Rx given:    1 AMP dextrose, D5w 50 cc/hr x 6hrs       Subsequent Assessment After Initial Interventions:   Time Since first assessment: 9 mis     Interim Vital Sign Checks: [x] Yes  [] No    Vital signs: Temp: 119/88, BP: , RR: 12, HR: 101 SpO2:100    Airway and Condition: Conscious, Pulse present, Breathing and Airway Open/Clear noted    Lungs And Circulation: clear to auscultation bilaterally and sinus rhythm noted                  Neurologic: pupils reactive, follows commands and motor strength equal  noted       Subsequent Interventions: None      RRT Assessment and Plan:    Thao Woodard is a 36 y.o. female with  has a past medical history of Acute congestive heart failure ([address] BC (acute kidney injury) ([address] Cardiac arrest ([address] Cephalgia, Chronic kidney disease, Depression, Diabetes mellitus ([address] Diabetic gastroparesis associated with type 1 diabetes mellitus ([address] Diabetic ketoacidosis ([address] Diabetic ketoacidosis with coma associated with type 1 diabetes mellitus ([address] Diabetic polyneuropathy associated with type 1 diabetes mellitus ([address] Drug use complicating pregnancy in third trimester, Endocarditis, ESRD (end stage renal disease) ([address] H/O cardiovascular stress test, Hemodialysis patient ([address] History of blood transfusion, Hyperlipidemia, Hyperosmolar hyperglycemic state (HHS) ([address] Hypothyroidism, Iron deficiency anemia, MDRO (multiple drug resistant organisms) resistance, MRSA (methicillin resistant Staphylococcus aureus), Non compliance w medication regimen, Other disorders of kidney and ureter, Pregnancy, Previous  delivery affecting pregnancy, antepartum, Previous stillbirth or demise, antepartum, Seizure ([address] Severe pre-eclampsia in third trimester, Shock liver, and Valvular endocarditis. who was admitted on 2022 with admitting diagnosis DKA, type 1, not at goal [facility]) [E10.10]  Type 1 diabetes mellitus with ketoacidosis without coma ([address] [E10.10]  Nausea and vomiting, intractability of vomiting not specified, unspecified vomiting type [R11.2]  . RRT was called on 2022 . Initial assessment and interventions as noted above.      Current problems include:   1. AMS, less responsive 2/2 to hypoglycemia  2. hypoglycemia    Plan:    1 amp dextrose   D5 50 cc/hr x 6 hrs   Labs, will follow up  ?     Code status: [x] Full  [] DNR-CCA  []DNR-CC []Limited    Disposition:  [x] No transfer   [] Transfer to monitor floor  [] Transfer to: [] MICU [] NICU [] CCU [] SICU    Patients family updated:     [] Yes  [x] No   Discussed with:  [] Critical Care Intensivist: Dr. Karina Barreto      [x] Primary Care Provider: Dr. Dottie Schwartz      [] Other: ?    Nikki Felty, MD PGY-2  1/22/2022 5:25 PM  Attending Physician: Dr Dottie Schwartz

## 2022-01-22 NOTE — PROGRESS NOTES
Hospitalist Progress Note      SYNOPSIS: Patient admitted on 2022 for DKA. Pt with ESRD    Ms. [address], a 36y.o. year old female  who  has a past medical history of Acute congestive heart failure, ESRD on Dialysis, Cardiac arrest, Depression, Diabetes mellitus, Diabetic gastroparesis associated with type 1 diabetes mellitus, Diabetic ketoacidosis with coma associated with type 1 diabetes mellitus, Diabetic polyneuropathy, Endocarditis, Hyperlipidemia, Hyperosmolar hyperglycemic state, Hypothyroidism, Iron deficiency anemia, multiple drug resistant organisms resistance, MRSA, Non compliance w medication regimen, Previous stillbirth or demise, antepartum, Seizure Severe pre-eclampsia in third trimester, Shock liver, and Valvular endocarditis.      Patient presented to the emergency with complaints of nausea and vomiting. She has vomited 7 times on day of admission. Not able to keep anything down. She  Has a history of recent COVID diagnosis. Laboratory studies reveal potassium 3.2, creatinine 4.0, anion gap 18, glucose 327, calcium 7.4, troponin 162, alk phos 204, beta hydroxybutyrate >4.50, hemoglobin 9.6. COVID-19 negative. pH 7.31. Patient was started on IV fluids and insulin infusion for DKA. Nephrology and Endocrinology were consulted. Pt underwent HD as well. Gap closed. Pt continues to have intermittent nausea    SUBJECTIVE:    She states she would like to go home. Temp (24hrs), Av °F (36.7 °C), Min:97.1 °F (36.2 °C), Max:98.7 °F (37.1 °C)    DIET: ADULT DIET; Clear Liquid; 4 carb choices (60 gm/meal)  ADULT ORAL NUTRITION SUPPLEMENT; Lunch, Dinner;  Other Oral Supplement; gelatein  CODE: Full Code  No intake or output data in the 24 hours ending 22 1153    OBJECTIVE:    BP (!) 132/93   Pulse 103   Temp 97.1 °F (36.2 °C) (Temporal)   Resp 16   Ht 5' 4\" (1.626 m)   Wt 158 lb 14.4 oz (72.1 kg)   SpO2 98%   BMI 27.28 kg/m²     General appearance: No apparent distress, appears stated age and cooperative. HEENT:  Conjunctivae/corneas clear. Neck: Supple. No jugular venous distention. Respiratory: Clear to auscultation bilaterally, normal respiratory effort  Cardiovascular: Regular rate rhythm, normal S1-S2  Abdomen: Soft, nontender, nondistended  Musculoskeletal: No clubbing, cyanosis, no bilateral lower extremity edema. Brisk capillary refill. Skin:  No rashes  on visible skin  Neurologic: awake, alert and following commands     Medications:  REVIEWED DAILY    Infusion Medications    dextrose 75 mL/hr (01/21/22 1912)     Scheduled Medications    vitamin D  50,000 Units Oral Q14 Days    insulin glargine-yfgn  2 Units SubCUTAneous QAM    insulin lispro  0-4 Units SubCUTAneous 4x Daily AC & HS    epoetin nena-epbx  2,000 Units SubCUTAneous Once per day on Mon Wed Fri    And    epoetin nena-epbx  3,000 Units SubCUTAneous Once per day on Mon Wed Fri    metoclopramide  5 mg Oral 4x Daily AC & HS    cholestyramine  1 packet Oral BID    pregabalin  75 mg Oral Daily    ARIPiprazole  5 mg Oral Nightly    levothyroxine  75 mcg Oral Daily    mirtazapine  15 mg Oral Nightly    pantoprazole  40 mg Oral BID AC    rOPINIRole  0.25 mg Oral Nightly    heparin (porcine)  5,000 Units SubCUTAneous Q8H    influenza virus vaccine  0.5 mL IntraMUSCular Prior to discharge     PRN Meds: acetaminophen, HYDROmorphone, glucose, dextrose, glucagon (rDNA), dextrose, potassium chloride **OR** potassium alternative oral replacement **OR** potassium chloride, dextrose, polyethylene glycol    Labs:     No results for input(s): WBC, HGB, HCT, PLT in the last 72 hours. Recent Labs     01/20/22  0313 01/21/22  0400    134   K 4.6 5.5*    100   CO2 27 20*   BUN 6 11   CREATININE 2.3* 3.4*   CALCIUM 8.0* 7.4*   PHOS 2.2* 3.4       No results for input(s): PROT, ALB, ALKPHOS, ALT, AST, BILITOT, AMYLASE, LIPASE in the last 72 hours.     No results for input(s): INR in the last 72 hours. No results for input(s): [place] in the last 72 hours. Chronic labs:    Lab Results   Component Value Date    CHOL 95 01/14/2020    TRIG 82 01/14/2020    HDL 33 01/14/2020    LDLCALC 46 01/14/2020    TSH 5.000 (H) 11/24/2021    INR 1.1 12/08/2021    LABA1C 8.7 (H) 12/08/2021       Radiology:XR CHEST PORTABLE    Result Date: 1/14/2022  Suspect early bibasilar infiltrates. XR CHEST PORTABLE    Result Date: 12/31/2021  No acute process. Dialysis catheter in place. ASSESSMENT:    Diabetic ketoacidosis  Hypokalemia  Elevated troponin in the setting of ESRD  End-stage renal disease on hemodialysis  Chronic anemia  Type 1 diabetes   Hypoglycemia. History of Cardiac arrest  Diabetic gastroparesis  History of Endocarditis       PLAN:      1. Remains extremely labile with her blood sugars. Endocrine has just changed her insulin yet again  2. Remains dialysis dependent. 3.  Maintain her oral meds for hypertension and vitamin D deficiency  4. Probably safe for discharge by tomorrow if maintains a rather euglycemic profile      DVT prophylaxis is in place  Possible discharge by tomorrow +++++++++++++++++++++++++++++++++++++++++++++++++  Brodie Bran MD  [address]  +++++++++++++++++++++++++++++++++++++++++++++++++  NOTE: This report was transcribed using voice recognition software. Every effort was made to ensure accuracy; however, inadvertent computerized transcription errors may be present.

## 2022-01-22 NOTE — PROGRESS NOTES
ENDOCRINOLOGY PROGRESS NOTE      Date of admission: 1/14/2022  Date of service: 1/22/2022  Admitting physician: Sarah Oliveros DO   Primary Care Physician: Stephanie Narvaez MD  Consultant physician: Luma Norwood MD     Reason for the consultation:  Uncontrolled DM,labile BG    History of Present Illness: The history is provided by the patient. Accuracy of the patient data is excellent    [address] is a very pleasant 36 y.o. old female with PMH of Type I DM, ESRD on PD,HTN,hypothyroidism, infective endocarditis and other listed below admitted to [facility] on 1/14/2022 because of N/V, abdominal pain and found to be in DKA. Endocrine service was consulted for DM management.      Subjective   Seen and examined this AM, BG dropped to 54 this AM     Point of care glucose monitoring (Independently reviewed)   Recent Labs     01/21/22  0807 01/21/22  1123 01/21/22  1726 01/21/22  1804 01/21/22  1837 01/21/22  1905 01/21/22  2145 01/22/22  0205   GLUMET 290* 155* 50* 50* 54* 169* 125* 99       Scheduled Meds:   vitamin D  50,000 Units Oral Q14 Days    insulin glargine-yfgn  2 Units SubCUTAneous QAM    insulin lispro  0-4 Units SubCUTAneous 4x Daily AC & HS    epoetin nena-epbx  2,000 Units SubCUTAneous Once per day on Mon Wed Fri    And    epoetin nena-epbx  3,000 Units SubCUTAneous Once per day on Mon Wed Fri    metoclopramide  5 mg Oral 4x Daily AC & HS    cholestyramine  1 packet Oral BID    pregabalin  75 mg Oral Daily    ARIPiprazole  5 mg Oral Nightly    levothyroxine  75 mcg Oral Daily    mirtazapine  15 mg Oral Nightly    pantoprazole  40 mg Oral BID AC    rOPINIRole  0.25 mg Oral Nightly    heparin (porcine)  5,000 Units SubCUTAneous Q8H    influenza virus vaccine  0.5 mL IntraMUSCular Prior to discharge     PRN Meds:   acetaminophen, 650 mg, Q4H PRN  HYDROmorphone, 0.25 mg, Q4H PRN  glucose, 15 g, PRN  dextrose, 12.5 g, PRN  glucagon (rDNA), 1 mg, PRN  dextrose, 100 mL/hr, PRN  potassium chloride, 40 mEq, PRN   Or  potassium alternative oral replacement, 40 mEq, PRN   Or  potassium chloride, 10 mEq, PRN  dextrose, 12.5 g, PRN  polyethylene glycol, 17 g, Daily PRN      Continuous Infusions:   dextrose 75 mL/hr (01/21/22 1912)       Review of Systems  All systems reviewed. All negative except for symptoms mentioned in HPI     OBJECTIVE    /88   Pulse 102   Temp 98.2 °F (36.8 °C) (Temporal)   Resp 16   Ht 5' 4\" (1.626 m)   Wt 158 lb 14.4 oz (72.1 kg)   SpO2 95%   BMI 27.28 kg/m²     Intake/Output Summary (Last 24 hours) at 1/22/2022 2702  Last data filed at 1/21/2022 1024  Gross per 24 hour   Intake 650 ml   Output 1500 ml   Net -850 ml       Physical examination:   General: awake alert, oriented x3, no abnormal position or movements. HEENT: normocephalic non-traumatic, no exophthalmos   Neck: supple  Pulm: Clear equal air entry no added sounds, no wheezing or rhonchi    Chest: Vascular access Rt. Chest. Lt chest scar. CVS: S1 + S2, no murmur  Abd: soft lax, nontender. Normal bowel sounds  Skin: warm, no lesions, no rash.     Neuro: CN intact, muscle power normal  Psych: normal mood, and affect    Review of Laboratory Data:  I personally reviewed the following labs:   Recent Labs     01/19/22  0739 01/19/22  1045   WBC 3.8*  --    RBC 2.45*  --    HGB 6.2* 7.8*   HCT 19.7* 23.9*   MCV 80.4  --    MCH 25.3*  --    MCHC 31.5*  --    RDW 20.6*  --    PLT 89*  --    MPV 10.9  --      Recent Labs     01/20/22  0313 01/21/22  0400    134   K 4.6 5.5*    100   CO2 27 20*   BUN 6 11   CREATININE 2.3* 3.4*   GLUCOSE 143* 432*   CALCIUM 8.0* 7.4*     Beta-Hydroxybutyrate   Date Value Ref Range Status   01/14/2022 >4.50 (H) 0.02 - 0.27 mmol/L Final   12/31/2021 >4.50 (H) 0.02 - 0.27 mmol/L Final   11/22/2021 0.53 (H) 0.02 - 0.27 mmol/L Final     Lab Results   Component Value Date    LABA1C 8.7 12/08/2021    LABA1C 10.2 11/23/2021    LABA1C 10.6 09/28/2021     Lab Results   Component Value Date/Time    TSH 5.000 (H) 11/24/2021 08:52 AM    T4FREE 1.16 11/24/2021 08:52 AM    K8XXPCW 8.6 11/05/2015 02:20 AM    FT3 1.3 (L) 10/31/2020 10:43 AM    M2YQBJS 36.73 (L) 07/20/2020 02:00 PM     Lab Results   Component Value Date    LABA1C 8.7 12/08/2021    GLUCOSE 432 01/21/2022    GLUCOSE 130 05/18/2012    MALBCR 2949.3 01/15/2020    LABMICR 1740.1 01/15/2020    LABCREA 59 01/15/2020    LABCREA 61 01/15/2020     Lab Results   Component Value Date    TRIG 82 01/14/2020    HDL 33 01/14/2020    LDLCALC 46 01/14/2020    CHOL 95 01/14/2020       Blood culture   Lab Results   Component Value Date    BC 5 Days no growth 01/01/2022    BC 5 Days no growth 11/22/2021       Radiology:  XR CHEST PORTABLE   Final Result   Suspect early bibasilar infiltrates. Medical Records/Labs/Images review:   I personally reviewed and summarized previous records   All labs and imaging were reviewed independently     [address], a 36 y.o.-old female seen today for inpatient diabetes management     Diabetes Mellitus type I    · Extremely insulin sensitive. Had hypoglycemic episode this AM with just 3u with Lantus   · Pt with DM type 1, need long acting insulin all the time to prevent DKA   · We recommend the following diabetes regimen    · Lantus 2 unit daily in AM   · modified Low dose sliding scale 1:80>180 with meals and at night    · Continue glucose check with meals and at bedtime   · Will titrate insulin dose based on the blood glucose trend & insulin requirement  · We will arrange for the patient to follow with us after discharge    Primary Hypothyroidism  · On levothyroxine 75 mcg daily.  She was missing doses   · To recheck TFT 6-8 weeks after discharge     N/V/ Diarrhea  · Resolved  · Symptomatic Management per primary and GI services    Acute on Chronic Anemia  · S/p 1U PRBC transfusion    ESRD  · Pt at risk for hypoglycemia  · On dialysis, nephrology following    The above issues were reviewed with the patient who understood and agreed with the plan. Thank you for allowing us to participate in the care of this patient. Please do not hesitate to contact us with any additional questions. Electronically signed by Lacho Sanz MD on 1/22/2022 at [address] [address] MD  Endocrinologist, [facility]   [address] [facility] [address]   Phone: [phone]  Fax: [phone]  ----------------------------  An electronic signature was used to authenticate this note.  Lacho Sanz MD on 1/22/2022 at 6:14 AM

## 2022-01-23 LAB
AMMONIA: 26 UMOL/L (ref 11–51)
ANION GAP SERPL CALCULATED.3IONS-SCNC: 8 MMOL/L (ref 7–16)
BUN BLDV-MCNC: 14 MG/DL (ref 6–20)
CALCIUM IONIZED: 1.14 MMOL/L (ref 1.15–1.33)
CALCIUM SERPL-MCNC: 7.5 MG/DL (ref 8.6–10.2)
CHLORIDE BLD-SCNC: 98 MMOL/L (ref 98–107)
CO2: 20 MMOL/L (ref 22–29)
CREAT SERPL-MCNC: 3.1 MG/DL (ref 0.5–1)
FOLATE: 7.8 NG/ML (ref 4.8–24.2)
GFR AFRICAN AMERICAN: 21
GFR NON-AFRICAN AMERICAN: 21 ML/MIN/1.73
GLUCOSE BLD-MCNC: 132 MG/DL (ref 74–99)
MAGNESIUM: 1.7 MG/DL (ref 1.6–2.6)
METER GLUCOSE: 102 MG/DL (ref 74–99)
METER GLUCOSE: 105 MG/DL (ref 74–99)
METER GLUCOSE: 114 MG/DL (ref 74–99)
METER GLUCOSE: 119 MG/DL (ref 74–99)
METER GLUCOSE: 131 MG/DL (ref 74–99)
METER GLUCOSE: 154 MG/DL (ref 74–99)
METER GLUCOSE: 69 MG/DL (ref 74–99)
METER GLUCOSE: 89 MG/DL (ref 74–99)
PHOSPHORUS: 2.4 MG/DL (ref 2.5–4.5)
POTASSIUM SERPL-SCNC: 5.3 MMOL/L (ref 3.5–5)
REASON FOR REJECTION: NORMAL
REJECTED TEST: NORMAL
SODIUM BLD-SCNC: 126 MMOL/L (ref 132–146)
TSH SERPL DL<=0.05 MIU/L-ACNC: 4.71 UIU/ML (ref 0.27–4.2)
VITAMIN B-12: 1336 PG/ML (ref 211–946)

## 2022-01-23 PROCEDURE — 90935 HEMODIALYSIS ONE EVALUATION: CPT

## 2022-01-23 PROCEDURE — 83735 ASSAY OF MAGNESIUM: CPT

## 2022-01-23 PROCEDURE — 82746 ASSAY OF FOLIC ACID SERUM: CPT

## 2022-01-23 PROCEDURE — 6360000002 HC RX W HCPCS: Performed by: FAMILY MEDICINE

## 2022-01-23 PROCEDURE — 99232 SBSQ HOSP IP/OBS MODERATE 35: CPT | Performed by: INTERNAL MEDICINE

## 2022-01-23 PROCEDURE — 36415 COLL VENOUS BLD VENIPUNCTURE: CPT

## 2022-01-23 PROCEDURE — 6360000002 HC RX W HCPCS: Performed by: NURSE PRACTITIONER

## 2022-01-23 PROCEDURE — 84100 ASSAY OF PHOSPHORUS: CPT

## 2022-01-23 PROCEDURE — 76937 US GUIDE VASCULAR ACCESS: CPT

## 2022-01-23 PROCEDURE — 2580000003 HC RX 258: Performed by: INTERNAL MEDICINE

## 2022-01-23 PROCEDURE — 6370000000 HC RX 637 (ALT 250 FOR IP): Performed by: STUDENT IN AN ORGANIZED HEALTH CARE EDUCATION/TRAINING PROGRAM

## 2022-01-23 PROCEDURE — 82962 GLUCOSE BLOOD TEST: CPT

## 2022-01-23 PROCEDURE — 82607 VITAMIN B-12: CPT

## 2022-01-23 PROCEDURE — 2500000003 HC RX 250 WO HCPCS: Performed by: INTERNAL MEDICINE

## 2022-01-23 PROCEDURE — 36410 VNPNXR 3YR/> PHY/QHP DX/THER: CPT

## 2022-01-23 PROCEDURE — 6370000000 HC RX 637 (ALT 250 FOR IP): Performed by: INTERNAL MEDICINE

## 2022-01-23 PROCEDURE — 2580000003 HC RX 258: Performed by: NURSE PRACTITIONER

## 2022-01-23 PROCEDURE — 6360000002 HC RX W HCPCS: Performed by: INTERNAL MEDICINE

## 2022-01-23 PROCEDURE — 99232 SBSQ HOSP IP/OBS MODERATE 35: CPT | Performed by: STUDENT IN AN ORGANIZED HEALTH CARE EDUCATION/TRAINING PROGRAM

## 2022-01-23 PROCEDURE — 82330 ASSAY OF CALCIUM: CPT

## 2022-01-23 PROCEDURE — S5553 INSULIN LONG ACTING 5 U: HCPCS | Performed by: INTERNAL MEDICINE

## 2022-01-23 PROCEDURE — 84443 ASSAY THYROID STIM HORMONE: CPT

## 2022-01-23 PROCEDURE — 80048 BASIC METABOLIC PNL TOTAL CA: CPT

## 2022-01-23 PROCEDURE — 2580000003 HC RX 258: Performed by: EMERGENCY MEDICINE

## 2022-01-23 PROCEDURE — 6370000000 HC RX 637 (ALT 250 FOR IP): Performed by: FAMILY MEDICINE

## 2022-01-23 PROCEDURE — 2500000003 HC RX 250 WO HCPCS: Performed by: FAMILY MEDICINE

## 2022-01-23 PROCEDURE — 82140 ASSAY OF AMMONIA: CPT

## 2022-01-23 PROCEDURE — C1751 CATH, INF, PER/CENT/MIDLINE: HCPCS

## 2022-01-23 PROCEDURE — 2060000000 HC ICU INTERMEDIATE R&B

## 2022-01-23 RX ORDER — SODIUM CHLORIDE 0.9 % (FLUSH) 0.9 %
5-40 SYRINGE (ML) INJECTION PRN
Status: DISCONTINUED | OUTPATIENT
Start: 2022-01-23 | End: 2022-01-28 | Stop reason: HOSPADM

## 2022-01-23 RX ORDER — SODIUM CHLORIDE 0.9 % (FLUSH) 0.9 %
5-40 SYRINGE (ML) INJECTION EVERY 12 HOURS SCHEDULED
Status: DISCONTINUED | OUTPATIENT
Start: 2022-01-23 | End: 2022-01-28 | Stop reason: HOSPADM

## 2022-01-23 RX ORDER — HEPARIN SODIUM (PORCINE) LOCK FLUSH IV SOLN 100 UNIT/ML 100 UNIT/ML
1 SOLUTION INTRAVENOUS EVERY 12 HOURS SCHEDULED
Status: DISCONTINUED | OUTPATIENT
Start: 2022-01-23 | End: 2022-01-28 | Stop reason: HOSPADM

## 2022-01-23 RX ORDER — SODIUM CHLORIDE 9 MG/ML
25 INJECTION, SOLUTION INTRAVENOUS PRN
Status: DISCONTINUED | OUTPATIENT
Start: 2022-01-23 | End: 2022-01-28 | Stop reason: HOSPADM

## 2022-01-23 RX ORDER — HEPARIN SODIUM (PORCINE) LOCK FLUSH IV SOLN 100 UNIT/ML 100 UNIT/ML
1 SOLUTION INTRAVENOUS PRN
Status: DISCONTINUED | OUTPATIENT
Start: 2022-01-23 | End: 2022-01-28 | Stop reason: HOSPADM

## 2022-01-23 RX ADMIN — PANTOPRAZOLE SODIUM 40 MG: 40 TABLET, DELAYED RELEASE ORAL at 06:37

## 2022-01-23 RX ADMIN — INSULIN GLARGINE-YFGN 2 UNITS: 100 INJECTION, SOLUTION SUBCUTANEOUS at 08:51

## 2022-01-23 RX ADMIN — HYDROMORPHONE HYDROCHLORIDE 0.25 MG: 1 INJECTION, SOLUTION INTRAMUSCULAR; INTRAVENOUS; SUBCUTANEOUS at 16:38

## 2022-01-23 RX ADMIN — CALCIUM GLUCONATE 1000 MG: 98 INJECTION, SOLUTION INTRAVENOUS at 16:32

## 2022-01-23 RX ADMIN — HEPARIN SODIUM 5000 UNITS: 10000 INJECTION INTRAVENOUS; SUBCUTANEOUS at 06:37

## 2022-01-23 RX ADMIN — ROPINIROLE 0.25 MG: 0.25 TABLET, FILM COATED ORAL at 20:49

## 2022-01-23 RX ADMIN — METOCLOPRAMIDE HYDROCHLORIDE 5 MG: 5 TABLET ORAL at 16:36

## 2022-01-23 RX ADMIN — CHOLESTYRAMINE 4 G: 4 POWDER, FOR SUSPENSION ORAL at 08:52

## 2022-01-23 RX ADMIN — PANTOPRAZOLE SODIUM 40 MG: 40 TABLET, DELAYED RELEASE ORAL at 16:37

## 2022-01-23 RX ADMIN — MIRTAZAPINE 15 MG: 15 TABLET, FILM COATED ORAL at 20:48

## 2022-01-23 RX ADMIN — SODIUM CHLORIDE, PRESERVATIVE FREE 10 ML: 5 INJECTION INTRAVENOUS at 20:49

## 2022-01-23 RX ADMIN — DEXTROSE MONOHYDRATE 12.5 G: 25 INJECTION, SOLUTION INTRAVENOUS at 02:01

## 2022-01-23 RX ADMIN — HYDROMORPHONE HYDROCHLORIDE 0.25 MG: 1 INJECTION, SOLUTION INTRAMUSCULAR; INTRAVENOUS; SUBCUTANEOUS at 03:21

## 2022-01-23 RX ADMIN — HEPARIN SODIUM 5000 UNITS: 10000 INJECTION INTRAVENOUS; SUBCUTANEOUS at 13:42

## 2022-01-23 RX ADMIN — HEPARIN SODIUM 5000 UNITS: 10000 INJECTION INTRAVENOUS; SUBCUTANEOUS at 21:32

## 2022-01-23 RX ADMIN — HYDROMORPHONE HYDROCHLORIDE 0.25 MG: 1 INJECTION, SOLUTION INTRAMUSCULAR; INTRAVENOUS; SUBCUTANEOUS at 20:50

## 2022-01-23 RX ADMIN — LEVOTHYROXINE SODIUM 75 MCG: 0.03 TABLET ORAL at 06:37

## 2022-01-23 RX ADMIN — PREGABALIN 75 MG: 75 CAPSULE ORAL at 08:51

## 2022-01-23 RX ADMIN — DEXTROSE MONOHYDRATE 100 ML/HR: 50 INJECTION, SOLUTION INTRAVENOUS at 00:06

## 2022-01-23 RX ADMIN — METOCLOPRAMIDE HYDROCHLORIDE 5 MG: 5 TABLET ORAL at 06:37

## 2022-01-23 RX ADMIN — HYDROMORPHONE HYDROCHLORIDE 0.25 MG: 1 INJECTION, SOLUTION INTRAMUSCULAR; INTRAVENOUS; SUBCUTANEOUS at 08:48

## 2022-01-23 RX ADMIN — METOCLOPRAMIDE HYDROCHLORIDE 5 MG: 5 TABLET ORAL at 20:49

## 2022-01-23 RX ADMIN — METOCLOPRAMIDE HYDROCHLORIDE 5 MG: 5 TABLET ORAL at 10:58

## 2022-01-23 RX ADMIN — SODIUM PHOSPHATE, MONOBASIC, MONOHYDRATE AND SODIUM PHOSPHATE, DIBASIC, ANHYDROUS 10 MMOL: 276; 142 INJECTION, SOLUTION INTRAVENOUS at 20:48

## 2022-01-23 RX ADMIN — ARIPIPRAZOLE 5 MG: 5 TABLET ORAL at 20:48

## 2022-01-23 ASSESSMENT — PAIN DESCRIPTION - DESCRIPTORS
DESCRIPTORS: DISCOMFORT;CONSTANT
DESCRIPTORS: ACHING;SHARP

## 2022-01-23 ASSESSMENT — PAIN SCALES - GENERAL
PAINLEVEL_OUTOF10: 9
PAINLEVEL_OUTOF10: 8
PAINLEVEL_OUTOF10: 6
PAINLEVEL_OUTOF10: 9
PAINLEVEL_OUTOF10: 0

## 2022-01-23 ASSESSMENT — PAIN - FUNCTIONAL ASSESSMENT: PAIN_FUNCTIONAL_ASSESSMENT: ACTIVITIES ARE NOT PREVENTED

## 2022-01-23 ASSESSMENT — PAIN DESCRIPTION - ONSET: ONSET: ON-GOING

## 2022-01-23 ASSESSMENT — PAIN DESCRIPTION - FREQUENCY
FREQUENCY: CONTINUOUS
FREQUENCY: CONTINUOUS

## 2022-01-23 ASSESSMENT — PAIN DESCRIPTION - LOCATION
LOCATION: ABDOMEN
LOCATION: ABDOMEN

## 2022-01-23 ASSESSMENT — PAIN DESCRIPTION - PROGRESSION: CLINICAL_PROGRESSION: NOT CHANGED

## 2022-01-23 ASSESSMENT — PAIN DESCRIPTION - PAIN TYPE
TYPE: ACUTE PAIN
TYPE: ACUTE PAIN

## 2022-01-23 NOTE — PROGRESS NOTES
Gastroenterology Progress Note    SUBJECTIVE:      Patient seen resting in her bed and I opted to not wake the patient. I looked at her dinner tray to find that it was stir casper w/ a significant amount of vegetables in it. OBJECTIVE    Physical    VITALS:  BP (!) 135/100   Pulse 112   Temp 98.1 °F (36.7 °C) (Temporal)   Resp 18   Ht 5' 4\" (1.626 m)   Wt 151 lb 0.2 oz (68.5 kg)   SpO2 100%   BMI 25.92 kg/m²   Physical Exam:  General: Overall well-appearing, NAD  HEENT: PERRLA, EOMI, Anicteric sclera, MMM, no rhinorrhea  Cards: RRR, no LE edema  Resp: Breathing comfortably on room air, good air movement, no use of accessory muscles, no audible wheezing  Abdomen: soft, NT, ND. Extremities: Moves all extremities, no effusions or bruising. Skin: No rashes or jaundice  Neuro: CN grossly intact, non-focal exam      ASSESSMENT AND PLAN      31y/F w/ history of poorly controlled DMI c/b ESRD on HD and gastroparesis who presents in DKA in the setting of recent infection w/ Covid.      PLAN:  1. Gastroparesis:  -The patient needs to be on a Gastroparesis diet. This should be small, frequent meals and the meals should consist of low-fat and low-fiber.   -Continue PPI BID.  -Okay to resume Reglan while inpatient though would be hesitant to continue on outpatient basis given Black Box Warning of tardive dyskinesia.   -Can consider adding nortriptyline 10mg qPM as well as long as it will not interact with other centrally-acting agents.      2. Loose Stools:  -Consider adding Questran BID for persistent loose stools as the patient is s/p CCY.    I will follow peripherally.      Thank you for including us in the care of this patient.  Please do not hesitate to contact us with any additional questions or concerns.     Dewayne Rapp MD  Gastroenterology/Hepatology  [facility]

## 2022-01-23 NOTE — PROGRESS NOTES
Department of Internal Medicine  Nephrology Progress Note    Events Reviewed. SUBJECTIVE:  We are following Ms. Khadijah Buckner for ESRD on HD. Reports no complaints.     PHYSICAL EXAM:      Vitals:    VITALS:  BP (!) 139/103   Pulse 110   Temp 98.1 °F (36.7 °C) (Temporal)   Resp 16   Ht 5' 4\" (1.626 m)   Wt 153 lb 10.6 oz (69.7 kg)   SpO2 98%   BMI 26.38 kg/m²   24HR INTAKE/OUTPUT:      Intake/Output Summary (Last 24 hours) at 1/23/2022 1122  Last data filed at 1/22/2022 1300  Gross per 24 hour   Intake 180 ml   Output --   Net 180 ml       Access: RIJ tunneled dialysis catheter  Constitutional:  Lethargic, but awakens to voice  HEENT:  PERRL, normocephalic, atraumatic  Respiratory:  CTA bilaterally  Cardiovascular/Edema:  ST, RRR, no murmur gallop or rub  Gastrointestinal:  abd distended, c/o persistent abdominal pain  Neurologic:  Lethargic, awakens to voice, follows commands, no focal deficit  Skin:  Warm, dry, ecchymotic areas to abdomen    Scheduled Meds:   calcium gluconate IVPB  1,000 mg IntraVENous Once    vitamin D  50,000 Units Oral Q14 Days    insulin glargine-yfgn  2 Units SubCUTAneous QAM    insulin lispro  0-4 Units SubCUTAneous 4x Daily AC & HS    epoetin nena-epbx  2,000 Units SubCUTAneous Once per day on Mon Wed Fri    And    epoetin nena-epbx  3,000 Units SubCUTAneous Once per day on Mon Wed Fri    metoclopramide  5 mg Oral 4x Daily AC & HS    cholestyramine  1 packet Oral BID    pregabalin  75 mg Oral Daily    ARIPiprazole  5 mg Oral Nightly    levothyroxine  75 mcg Oral Daily    mirtazapine  15 mg Oral Nightly    pantoprazole  40 mg Oral BID AC    rOPINIRole  0.25 mg Oral Nightly    heparin (porcine)  5,000 Units SubCUTAneous Q8H    influenza virus vaccine  0.5 mL IntraMUSCular Prior to discharge     Continuous Infusions:   dextrose Stopped (01/23/22 0915)     PRN Meds:.acetaminophen, HYDROmorphone, glucose, dextrose, glucagon (rDNA), dextrose, potassium chloride **OR** potassium alternative oral replacement **OR** potassium chloride, dextrose, polyethylene glycol    DATA:    CBC with Differential:    Lab Results   Component Value Date    WBC 6.0 01/22/2022    RBC 2.88 01/22/2022    HGB 7.4 01/22/2022    HCT 23.1 01/22/2022    PLT 97 01/22/2022    MCV 80.2 01/22/2022    MCH 25.7 01/22/2022    MCHC 32.0 01/22/2022    RDW 20.1 01/22/2022    NRBC 0.9 08/10/2020    SEGSPCT 67 06/27/2013    METASPCT 1.7 08/10/2020    LYMPHOPCT 29.3 01/22/2022    MONOPCT 6.5 01/22/2022    MYELOPCT 0.9 01/19/2022    BASOPCT 0.7 01/22/2022    MONOSABS 0.39 01/22/2022    LYMPHSABS 1.75 01/22/2022    EOSABS 0.07 01/22/2022    BASOSABS 0.04 01/22/2022     CMP:    Lab Results   Component Value Date     01/23/2022    K 5.3 01/23/2022    K 4.2 11/24/2021    CL 98 01/23/2022    CO2 20 01/23/2022    BUN 14 01/23/2022    CREATININE 3.1 01/23/2022    GFRAA 21 01/23/2022    LABGLOM 21 01/23/2022    GLUCOSE 132 01/23/2022    GLUCOSE 130 05/18/2012    PROT 5.4 01/22/2022    LABALBU 2.5 01/22/2022    LABALBU 4.1 05/18/2012    CALCIUM 7.5 01/23/2022    BILITOT 0.3 01/22/2022    ALKPHOS 177 01/22/2022    AST 18 01/22/2022    ALT 9 01/22/2022     Magnesium:    Lab Results   Component Value Date    MG 1.7 01/23/2022     Phosphorus:    Lab Results   Component Value Date    PHOS 2.4 01/23/2022     Radiology Review:                Chest Xray 1/14/22   Suspect early bibasilar infiltrates. BRIEF SUMMARY OF INITIAL CONSULT:     Briefly, Miss Swapna Junior is a 36year-old female with a PMH of ESRD on home hemodialysis 5 days/wk, (initiated September 2021, Type I DM, poorly controlled with recurrent admissions for DKA, HTN, gastroparesis, ascites, infective endocarditis, cardiac arrest, hypothyroidism and depression. She was recently admitted earlier this month after testing positive for COVID 19. She presented to the ER on January 14th, 2022 with complaints of nausea and vomiting for one month and hyperglycemia. She states she has not been compliant with insulin therapy recently. In the ER she was found to be in DKA with blood sugar of 400 And beta hydroxy greater than 4.5. Other pertinent lab work revealed sodium 132, chloride 92, anion gap 19, BUN 13, Creatinine 4.5, lactic acid 2.3. She was started on DKA protocol and IVFs in the ER. Renal is consulted for ESRD on HD and DKA. Problems Resolved:   · DKA,  beta hydroxy greater than 4.5. S/P insulin drip. Endocrinology following   · Hypokalemia, 2/2 GI losses from vomiting and poor oral intake    IMPRESSION/RECOMMENDATIONS:       1. ESRD on home hemodialysis 4 days/wk (per patient) via RIJ tunneled dialysis catheter. To continue HD MWF while inpatient. Hyperkalemic today with 2 hours of HD. 2. Hyponatremia, 2/2 decreased free water excretion from ESRD. 1L fluid restriction. 3. HTN, on lopressor and amlodipine   4. Hypophosphatemia, 2/2 poor intake, levels improved   5. Hypocalcemia, 2/2 vitamin D deficiency on CKD. To Replace  6. Vitamin D deficiency, continue ergocalciferol  7. 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 Anemia of CKD, iron level 40, iron saturation 77%, on alpha to 5,000 unit 3 times/wk, status post transfusion  9. Type I DM, poorly controlled with frequent readmissions for DKA  ------------------------------------------------------  10. Hx Covid 19, unvaccinated  11. Restless leg syndrome, on ropinirole  12. Depression, on Abilify  13. Hypothyroidism, on levothyroxine  14. Obtain ionized ionized  15.  History of gastroparesis, on metoclopramide, GI consult following.   16. Nutrition, clear liquid diet     Plan:     · HD x2 hours today  · Replace calcium  · Replace phosphorus  · HD tomorrow and 3 times a week M-W-F   · Continue epoetin alpha 5,000 units 3 times/wk  · Continue ergocalciferol 50,000 units every 2 weeks  · Continue to monitor phosphorus levels  · Continue to monitor H&H       Electronically signed by Tracy Rubalcava MD on 1/23/2022 at 11:22 AM

## 2022-01-23 NOTE — PROGRESS NOTES
Ricardo served Dr Renetta Esteban regarding diet pt called to verify if he should take his lisinopril in the am , preoperatively. (the surgical dept instr him to HOLD but dr Hernandez said to take it) I confirmed with dr Hernandez that she wants him to take the med at 4AM with a sip of water as directed, in  fear that surgery will be canceled due to HTN, .assured that his renal function and BP will be monitored closely . pt voiced understanding.      Electronically signed by:CHRISTIANO GARCIA N.P.  Apr 25 2017  3:56PM CST Author

## 2022-01-23 NOTE — PROGRESS NOTES
Contacted on call dialysis nurse to dialyze patient per nursing communication order.  He stated they would be here this afternoon around 3 or 4 PM

## 2022-01-23 NOTE — PROGRESS NOTES
BS 45. 12.5 mg dextrose 50% administered and D5 infusion increased to 100 ml/hr per PRN protocol.  Patients  at 1925

## 2022-01-23 NOTE — PROGRESS NOTES
ENDOCRINOLOGY PROGRESS NOTE      Date of admission: 1/14/2022  Date of service: 1/23/2022  Admitting physician: Miri Stoll DO   Primary Care Physician: Sangeeta Santizo MD  Consultant physician: Laila Carrasco MD     Reason for the consultation:  Uncontrolled DM,labile BG    History of Present Illness: The history is provided by the patient. Accuracy of the patient data is excellent    [address] Maldonado is a very pleasant 36 y.o. old female with PMH of Type I DM, ESRD on PD,HTN,hypothyroidism, infective endocarditis and other listed below admitted to [facility] on 1/14/2022 because of N/V, abdominal pain and found to be in DKA. Endocrine service was consulted for DM management.      Subjective   Seen and examined this AM, BG at goal     Point of care glucose monitoring (Independently reviewed)   Recent Labs     01/22/22  1857 01/22/22  1925 01/22/22  2019 01/23/22  0151 01/23/22  0223 01/23/22  0604 01/23/22  0949 01/23/22  1059   GLUMET 45* 109* 93 69* 131* 119* 114* 105*       Scheduled Meds:   calcium gluconate IVPB  1,000 mg IntraVENous Once    vitamin D  50,000 Units Oral Q14 Days    insulin glargine-yfgn  2 Units SubCUTAneous QAM    insulin lispro  0-4 Units SubCUTAneous 4x Daily AC & HS    epoetin nena-epbx  2,000 Units SubCUTAneous Once per day on Mon Wed Fri    And    epoetin nena-epbx  3,000 Units SubCUTAneous Once per day on Mon Wed Fri    metoclopramide  5 mg Oral 4x Daily AC & HS    cholestyramine  1 packet Oral BID    pregabalin  75 mg Oral Daily    ARIPiprazole  5 mg Oral Nightly    levothyroxine  75 mcg Oral Daily    mirtazapine  15 mg Oral Nightly    pantoprazole  40 mg Oral BID AC    rOPINIRole  0.25 mg Oral Nightly    heparin (porcine)  5,000 Units SubCUTAneous Q8H    influenza virus vaccine  0.5 mL IntraMUSCular Prior to discharge     PRN Meds:   acetaminophen, 650 mg, Q4H PRN  HYDROmorphone, 0.25 mg, Q4H PRN  glucose, 15 g, PRN  dextrose, 12.5 g, PRN  glucagon (rDNA), 1 mg, PRN  dextrose, 100 mL/hr, PRN  potassium chloride, 40 mEq, PRN   Or  potassium alternative oral replacement, 40 mEq, PRN   Or  potassium chloride, 10 mEq, PRN  dextrose, 12.5 g, PRN  polyethylene glycol, 17 g, Daily PRN      Continuous Infusions:   dextrose Stopped (01/23/22 0915)       Review of Systems  All systems reviewed. All negative except for symptoms mentioned in HPI     OBJECTIVE    BP (!) 139/103   Pulse 110   Temp 98.1 °F (36.7 °C) (Temporal)   Resp 16   Ht 5' 4\" (1.626 m)   Wt 153 lb 10.6 oz (69.7 kg)   SpO2 98%   BMI 26.38 kg/m²     Intake/Output Summary (Last 24 hours) at 1/23/2022 1129  Last data filed at 1/22/2022 1300  Gross per 24 hour   Intake 180 ml   Output --   Net 180 ml       Physical examination:   General: awake alert, oriented x3, no abnormal position or movements. HEENT: normocephalic non-traumatic, no exophthalmos   Neck: supple  Pulm: Clear equal air entry no added sounds, no wheezing or rhonchi    Chest: Vascular access Rt. Chest. Lt chest scar. CVS: S1 + S2, no murmur  Abd: soft lax, nontender. Normal bowel sounds  Skin: warm, no lesions, no rash.     Neuro: CN intact, muscle power normal  Psych: normal mood, and affect    Review of Laboratory Data:  I personally reviewed the following labs:   Recent Labs     01/22/22  1635   WBC 6.0   RBC 2.88*   HGB 7.4*   HCT 23.1*   MCV 80.2   MCH 25.7*   MCHC 32.0   RDW 20.1*   PLT 97*   MPV 11.0     Recent Labs     01/22/22  1454 01/22/22  1454 01/22/22  1635 01/22/22  1635 01/23/22  0600     --  132  --  126*   K 5.6*   < > 4.4 4.27 5.3*     --  100  --  98   CO2 21*  --  25  --  20*   BUN 12  --  13  --  14   CREATININE 2.8*  --  2.9*  --  3.1*   GLUCOSE 81  --  144*  --  132*   CALCIUM 7.8*  --  7.4*  --  7.5*   PROT 5.4*  --   --   --   --    LABALBU 2.5*  --   --   --   --    BILITOT 0.3  --   --   --   --    ALKPHOS 177*  --   --   --   --    AST 18  --   --   --   --    ALT 9  --   --   --   --     < > = values in this interval not displayed. Beta-Hydroxybutyrate   Date Value Ref Range Status   01/14/2022 >4.50 (H) 0.02 - 0.27 mmol/L Final   12/31/2021 >4.50 (H) 0.02 - 0.27 mmol/L Final   11/22/2021 0.53 (H) 0.02 - 0.27 mmol/L Final     Lab Results   Component Value Date    LABA1C 8.7 12/08/2021    LABA1C 10.2 11/23/2021    LABA1C 10.6 09/28/2021     Lab Results   Component Value Date/Time    TSH 4.710 (H) 01/23/2022 06:00 AM    T4FREE 1.16 11/24/2021 08:52 AM    T6ZPMSW 8.6 11/05/2015 02:20 AM    FT3 1.3 (L) 10/31/2020 10:43 AM    Y1LBLRH 36.73 (L) 07/20/2020 02:00 PM     Lab Results   Component Value Date    LABA1C 8.7 12/08/2021    GLUCOSE 132 01/23/2022    GLUCOSE 130 05/18/2012    MALBCR 2949.3 01/15/2020    LABMICR 1740.1 01/15/2020    LABCREA 59 01/15/2020    LABCREA 61 01/15/2020     Lab Results   Component Value Date    TRIG 82 01/14/2020    HDL 33 01/14/2020    LDLCALC 46 01/14/2020    CHOL 95 01/14/2020       Blood culture   Lab Results   Component Value Date    BC 5 Days no growth 01/01/2022    BC 5 Days no growth 11/22/2021       Radiology:  CT HEAD WO CONTRAST   Final Result   No acute intracranial abnormality. XR CHEST PORTABLE   Final Result   Right lower lobe atelectasis versus infiltrate. XR CHEST PORTABLE   Final Result   Suspect early bibasilar infiltrates. Medical Records/Labs/Images review:   I personally reviewed and summarized previous records   All labs and imaging were reviewed independently     [address] Maldonado, a 36 y.o.-old female seen today for inpatient diabetes management     Diabetes Mellitus type I    · Extremely insulin sensitive.  Pt with DM type 1, need long acting insulin all the time to prevent DKA   · We recommend the following diabetes regimen    · Lantus 2 unit daily in AM   · modified Low dose sliding scale 1:80>180 with meals and at night    · Continue glucose check with meals and at bedtime   · Will titrate insulin dose based on the blood glucose trend & insulin requirement  · We will arrange for the patient to follow with us after discharge    Primary Hypothyroidism  · On levothyroxine 75 mcg daily. She was missing doses   · To recheck TFT 6-8 weeks after discharge     N/V/ Diarrhea  · Resolved  · Symptomatic Management per primary and GI services    Acute on Chronic Anemia  · S/p 1U PRBC transfusion    ESRD  · Pt at risk for hypoglycemia  · On dialysis, nephrology following    The above issues were reviewed with the patient who understood and agreed with the plan. Thank you for allowing us to participate in the care of this patient. Please do not hesitate to contact us with any additional questions. Electronically signed by Hailee Blanc MD on 1/23/2022 at 11:29 Lei Whitman MD  Endocrinologist, [facility]   [address] [place] [id number]   Phone: [phone]  Fax: [phone]  ----------------------------  An electronic signature was used to authenticate this note.  Hailee Blanc MD on 1/23/2022 at 11:29 AM

## 2022-01-23 NOTE — PROGRESS NOTES
Hospitalist Progress Note      SYNOPSIS: Patient admitted on 2022 for DKA. Pt with ESRD    Ms. [address], a 36y.o. year old female  who  has a past medical history of Acute congestive heart failure, ESRD on Dialysis, Cardiac arrest, Depression, Diabetes mellitus, Diabetic gastroparesis associated with type 1 diabetes mellitus, Diabetic ketoacidosis with coma associated with type 1 diabetes mellitus, Diabetic polyneuropathy, Endocarditis, Hyperlipidemia, Hyperosmolar hyperglycemic state, Hypothyroidism, Iron deficiency anemia, multiple drug resistant organisms resistance, MRSA, Non compliance w medication regimen, Previous stillbirth or demise, antepartum, Seizure Severe pre-eclampsia in third trimester, Shock liver, and Valvular endocarditis.      Patient presented to the emergency with complaints of nausea and vomiting. She has vomited 7 times on day of admission. Not able to keep anything down. She  Has a history of recent COVID diagnosis. Laboratory studies reveal potassium 3.2, creatinine 4.0, anion gap 18, glucose 327, calcium 7.4, troponin 162, alk phos 204, beta hydroxybutyrate >4.50, hemoglobin 9.6. COVID-19 negative. pH 7.31. Patient was started on IV fluids and insulin infusion for DKA. Nephrology and Endocrinology were consulted. Pt underwent HD as well. Gap closed. Pt continues to have intermittent nausea    SUBJECTIVE:    She states she would like to go home.   Rapid response yesterday for low blood sugar. Endocrine has seen her and switched her insulin    Temp (24hrs), Av.2 °F (36.2 °C), Min:96.8 °F (36 °C), Max:98.1 °F (36.7 °C)    DIET: ADULT ORAL NUTRITION SUPPLEMENT; Lunch, Dinner; Other Oral Supplement; gelatein  ADULT DIET; Clear Liquid; 4 carb choices (60 gm/meal);  Low Potassium (Less than 3000 mg/day); 1000 ml  CODE: Full Code    Intake/Output Summary (Last 24 hours) at 2022 1149  Last data filed at 2022 1300  Gross per 24 hour   Intake 180 ml Output --   Net 180 ml       OBJECTIVE:    BP (!) 139/103   Pulse 110   Temp 98.1 °F (36.7 °C) (Temporal)   Resp 16   Ht 5' 4\" (1.626 m)   Wt 153 lb 10.6 oz (69.7 kg)   SpO2 98%   BMI 26.38 kg/m²     General appearance: No apparent distress, appears stated age and cooperative. HEENT:  Conjunctivae/corneas clear. Neck: Supple. No jugular venous distention. Respiratory: Clear to auscultation bilaterally, normal respiratory effort  Cardiovascular: Regular rate rhythm, normal S1-S2  Abdomen: Soft, nontender, nondistended  Musculoskeletal: No clubbing, cyanosis, no bilateral lower extremity edema. Brisk capillary refill.    Skin:  No rashes  on visible skin  Neurologic: awake, alert and following commands     Medications:  REVIEWED DAILY    Infusion Medications    dextrose Stopped (01/23/22 0915)     Scheduled Medications    calcium gluconate IVPB  1,000 mg IntraVENous Once    vitamin D  50,000 Units Oral Q14 Days    insulin glargine-yfgn  2 Units SubCUTAneous QAM    insulin lispro  0-4 Units SubCUTAneous 4x Daily AC & HS    epoetin nena-epbx  2,000 Units SubCUTAneous Once per day on Mon Wed Fri    And    epoetin nena-epbx  3,000 Units SubCUTAneous Once per day on Mon Wed Fri    metoclopramide  5 mg Oral 4x Daily AC & HS    cholestyramine  1 packet Oral BID    pregabalin  75 mg Oral Daily    ARIPiprazole  5 mg Oral Nightly    levothyroxine  75 mcg Oral Daily    mirtazapine  15 mg Oral Nightly    pantoprazole  40 mg Oral BID AC    rOPINIRole  0.25 mg Oral Nightly    heparin (porcine)  5,000 Units SubCUTAneous Q8H    influenza virus vaccine  0.5 mL IntraMUSCular Prior to discharge     PRN Meds: acetaminophen, HYDROmorphone, glucose, dextrose, glucagon (rDNA), dextrose, potassium chloride **OR** potassium alternative oral replacement **OR** potassium chloride, dextrose, polyethylene glycol    Labs:     Recent Labs     01/22/22  1635   WBC 6.0   HGB 7.4*   HCT 23.1*   PLT 97*       Recent Labs 01/22/22  1454 01/22/22  1454 01/22/22  1635 01/22/22  1635 01/23/22  0600     --  132  --  126*   K 5.6*   < > 4.4 4.27 5.3*     --  100  --  98   CO2 21*  --  25  --  20*   BUN 12  --  13  --  14   CREATININE 2.8*  --  2.9*  --  3.1*   CALCIUM 7.8*  --  7.4*  --  7.5*   PHOS 2.3*  --  2.1*  --  2.4*    < > = values in this interval not displayed. Recent Labs     01/22/22  1454   PROT 5.4*   ALKPHOS 177*   ALT 9   AST 18   BILITOT 0.3       No results for input(s): INR in the last 72 hours. No results for input(s): [place] in the last 72 hours. Chronic labs:    Lab Results   Component Value Date    CHOL 95 01/14/2020    TRIG 82 01/14/2020    HDL 33 01/14/2020    LDLCALC 46 01/14/2020    TSH 4.710 (H) 01/23/2022    INR 1.1 12/08/2021    LABA1C 8.7 (H) 12/08/2021       Radiology:XR CHEST PORTABLE    Result Date: 1/14/2022  Suspect early bibasilar infiltrates. XR CHEST PORTABLE    Result Date: 12/31/2021  No acute process. Dialysis catheter in place. ASSESSMENT:    Diabetic ketoacidosis  Hypokalemia  Elevated troponin in the setting of ESRD  End-stage renal disease on hemodialysis  Chronic anemia  Type 1 diabetes   Hypoglycemia. History of Cardiac arrest  Diabetic gastroparesis  History of Endocarditis       PLAN:      1. Remains extremely labile with her blood sugars. Endocrine has just changed her insulin yet again  2. Remains dialysis dependent. 3.  Maintain her oral meds for hypertension and vitamin D deficiency  4. Probably safe for discharge by tomorrow if maintains a rather euglycemic profile    January 23  Remains to be quite brittle is a diabetic. She appeared to be quite unresponsive at the point of her rapid response team.  Insulin has been readjusted. She is already on end-stage renal dialysis with hemodialysis dependence. She is not saying much. Her intakes are variable which does not help. Unable to discharge today.   I would like to see sugars better currently 24.8 hours will be discharged to avoid risk of readmission      DVT prophylaxis is in place  Possible discharge next 48 hours +++++++++++++++++++++++++++++++++++++++++++++++++  Ben Guadarrama MD  [facility] Physician - [address] [place] [address]  +++++++++++++++++++++++++++++++++++++++++++++++++  NOTE: This report was transcribed using voice recognition software. Every effort was made to ensure accuracy; however, inadvertent computerized transcription errors may be present. [Negative] : Endocrine

## 2022-01-23 NOTE — PROGRESS NOTES
POWER CHG MIDLINE  Placement 1/23/2022    Product number: [id number]   Lot Number: [id number]      Ultrasound: YES WITH SONOSITE   Left Basilic vein:                Upper Arm Circumference: 25    Size: 4.5    Exposed Length: 1    Internal Length: 14   Cut: 0   Vein Measurement: 0.40    Abrahan Chapman RN  1/23/2022  4:14 PM

## 2022-01-23 NOTE — FLOWSHEET NOTE
01/23/22 1857   Vital Signs   /76   Temp 97.8 °F (36.6 °C)   Pulse 99   Resp 18   Weight 151 lb 0.2 oz (68.5 kg)   Weight Method Estimated   Percent Weight Change -1.72   Pain Assessment   Pain Assessment 0-10   Pain Level 0   Post-Hemodialysis Assessment   Post-Treatment Procedures Blood returned;Catheter capped, clamped with Citrate x 2 ports   Machine Disinfection Process Exterior Machine Disinfection   Rinseback Volume (ml) 300 ml   Dialyzer Clearance Clear   Duration of Treatment (minutes) 120 minutes   Hemodialysis Output (ml) 1800 ml   NET Removed (ml) 1500 ml   Tolerated Treatment Good   Patient Response to Treatment tolerated well   Bilateral Breath Sounds Clear   Edema None

## 2022-01-23 NOTE — PROGRESS NOTES
Dr. Akosua Retana notified that patient loss IV access and IV team unable to place a new one.  Ask for midline order

## 2022-01-23 NOTE — PROGRESS NOTES
Two attempts made for IV insertion, unable to obtain access.  Perfect served IV team for peripheral IV

## 2022-01-24 PROBLEM — E11.43 DIABETIC GASTROPARESIS (HCC): Status: ACTIVE | Noted: 2022-01-24

## 2022-01-24 PROBLEM — K31.84 DIABETIC GASTROPARESIS (HCC): Status: ACTIVE | Noted: 2022-01-24

## 2022-01-24 PROBLEM — Z78.9 FREQUENT HOSPITAL ADMISSIONS: Status: ACTIVE | Noted: 2022-01-24

## 2022-01-24 LAB
ABO/RH: NORMAL
ANION GAP SERPL CALCULATED.3IONS-SCNC: 7 MMOL/L (ref 7–16)
ANISOCYTOSIS: ABNORMAL
ANTIBODY SCREEN: NORMAL
BASOPHILS ABSOLUTE: 0.02 E9/L (ref 0–0.2)
BASOPHILS RELATIVE PERCENT: 0.5 % (ref 0–2)
BLOOD BANK DISPENSE STATUS: NORMAL
BLOOD BANK PRODUCT CODE: NORMAL
BPU ID: NORMAL
BUN BLDV-MCNC: 8 MG/DL (ref 6–20)
BURR CELLS: ABNORMAL
CALCIUM SERPL-MCNC: 7.6 MG/DL (ref 8.6–10.2)
CHLORIDE BLD-SCNC: 101 MMOL/L (ref 98–107)
CO2: 28 MMOL/L (ref 22–29)
CREAT SERPL-MCNC: 2 MG/DL (ref 0.5–1)
DESCRIPTION BLOOD BANK: NORMAL
EKG ATRIAL RATE: 106 BPM
EKG P AXIS: 40 DEGREES
EKG P-R INTERVAL: 152 MS
EKG Q-T INTERVAL: 412 MS
EKG QRS DURATION: 110 MS
EKG QTC CALCULATION (BAZETT): 547 MS
EKG R AXIS: 34 DEGREES
EKG T AXIS: 70 DEGREES
EKG VENTRICULAR RATE: 106 BPM
EOSINOPHILS ABSOLUTE: 0.03 E9/L (ref 0.05–0.5)
EOSINOPHILS RELATIVE PERCENT: 0.7 % (ref 0–6)
GFR AFRICAN AMERICAN: 35
GFR NON-AFRICAN AMERICAN: 35 ML/MIN/1.73
GLUCOSE BLD-MCNC: 100 MG/DL (ref 74–99)
HCT VFR BLD CALC: 21.6 % (ref 34–48)
HEMOGLOBIN: 6.9 G/DL (ref 11.5–15.5)
HYPOCHROMIA: ABNORMAL
IMMATURE GRANULOCYTES #: 0.02 E9/L
IMMATURE GRANULOCYTES %: 0.5 % (ref 0–5)
LYMPHOCYTES ABSOLUTE: 0.84 E9/L (ref 1.5–4)
LYMPHOCYTES RELATIVE PERCENT: 19.6 % (ref 20–42)
MCH RBC QN AUTO: 25.4 PG (ref 26–35)
MCHC RBC AUTO-ENTMCNC: 31.9 % (ref 32–34.5)
MCV RBC AUTO: 79.4 FL (ref 80–99.9)
METER GLUCOSE: 169 MG/DL (ref 74–99)
METER GLUCOSE: 240 MG/DL (ref 74–99)
METER GLUCOSE: 74 MG/DL (ref 74–99)
METER GLUCOSE: 96 MG/DL (ref 74–99)
MONOCYTES ABSOLUTE: 0.23 E9/L (ref 0.1–0.95)
MONOCYTES RELATIVE PERCENT: 5.4 % (ref 2–12)
NEUTROPHILS ABSOLUTE: 3.14 E9/L (ref 1.8–7.3)
NEUTROPHILS RELATIVE PERCENT: 73.3 % (ref 43–80)
OVALOCYTES: ABNORMAL
PAPPENHEIMER BODIES: ABNORMAL
PDW BLD-RTO: 20.6 FL (ref 11.5–15)
PLATELET # BLD: 78 E9/L (ref 130–450)
PLATELET CONFIRMATION: NORMAL
PMV BLD AUTO: ABNORMAL FL (ref 7–12)
POIKILOCYTES: ABNORMAL
POTASSIUM SERPL-SCNC: 4.1 MMOL/L (ref 3.5–5)
RBC # BLD: 2.72 E12/L (ref 3.5–5.5)
SODIUM BLD-SCNC: 136 MMOL/L (ref 132–146)
TARGET CELLS: ABNORMAL
WBC # BLD: 4.3 E9/L (ref 4.5–11.5)

## 2022-01-24 PROCEDURE — 6370000000 HC RX 637 (ALT 250 FOR IP): Performed by: INTERNAL MEDICINE

## 2022-01-24 PROCEDURE — 93010 ELECTROCARDIOGRAM REPORT: CPT | Performed by: INTERNAL MEDICINE

## 2022-01-24 PROCEDURE — 2060000000 HC ICU INTERMEDIATE R&B

## 2022-01-24 PROCEDURE — 6360000002 HC RX W HCPCS: Performed by: INTERNAL MEDICINE

## 2022-01-24 PROCEDURE — 82962 GLUCOSE BLOOD TEST: CPT

## 2022-01-24 PROCEDURE — 85025 COMPLETE CBC W/AUTO DIFF WBC: CPT

## 2022-01-24 PROCEDURE — 2580000003 HC RX 258: Performed by: INTERNAL MEDICINE

## 2022-01-24 PROCEDURE — P9016 RBC LEUKOCYTES REDUCED: HCPCS

## 2022-01-24 PROCEDURE — 6370000000 HC RX 637 (ALT 250 FOR IP): Performed by: FAMILY MEDICINE

## 2022-01-24 PROCEDURE — 80048 BASIC METABOLIC PNL TOTAL CA: CPT

## 2022-01-24 PROCEDURE — 86923 COMPATIBILITY TEST ELECTRIC: CPT

## 2022-01-24 PROCEDURE — 86901 BLOOD TYPING SEROLOGIC RH(D): CPT

## 2022-01-24 PROCEDURE — 86900 BLOOD TYPING SEROLOGIC ABO: CPT

## 2022-01-24 PROCEDURE — 90935 HEMODIALYSIS ONE EVALUATION: CPT | Performed by: INTERNAL MEDICINE

## 2022-01-24 PROCEDURE — 86850 RBC ANTIBODY SCREEN: CPT

## 2022-01-24 PROCEDURE — 6370000000 HC RX 637 (ALT 250 FOR IP): Performed by: STUDENT IN AN ORGANIZED HEALTH CARE EDUCATION/TRAINING PROGRAM

## 2022-01-24 PROCEDURE — 6360000002 HC RX W HCPCS: Performed by: FAMILY MEDICINE

## 2022-01-24 PROCEDURE — 36415 COLL VENOUS BLD VENIPUNCTURE: CPT

## 2022-01-24 PROCEDURE — 36430 TRANSFUSION BLD/BLD COMPNT: CPT

## 2022-01-24 PROCEDURE — S5553 INSULIN LONG ACTING 5 U: HCPCS | Performed by: INTERNAL MEDICINE

## 2022-01-24 PROCEDURE — P9047 ALBUMIN (HUMAN), 25%, 50ML: HCPCS | Performed by: INTERNAL MEDICINE

## 2022-01-24 PROCEDURE — 6360000002 HC RX W HCPCS

## 2022-01-24 RX ORDER — SODIUM CHLORIDE 9 MG/ML
INJECTION, SOLUTION INTRAVENOUS PRN
Status: DISCONTINUED | OUTPATIENT
Start: 2022-01-24 | End: 2022-01-25

## 2022-01-24 RX ORDER — ALBUMIN (HUMAN) 12.5 G/50ML
25 SOLUTION INTRAVENOUS ONCE
Status: COMPLETED | OUTPATIENT
Start: 2022-01-24 | End: 2022-01-24

## 2022-01-24 RX ORDER — TRAMADOL HYDROCHLORIDE 50 MG/1
25 TABLET ORAL ONCE
Status: COMPLETED | OUTPATIENT
Start: 2022-01-24 | End: 2022-01-24

## 2022-01-24 RX ORDER — INSULIN GLARGINE-YFGN 100 [IU]/ML
1 INJECTION, SOLUTION SUBCUTANEOUS 2 TIMES DAILY
Status: DISCONTINUED | OUTPATIENT
Start: 2022-01-24 | End: 2022-01-25

## 2022-01-24 RX ORDER — ACETAMINOPHEN 500 MG
1000 TABLET ORAL EVERY 6 HOURS PRN
Status: DISCONTINUED | OUTPATIENT
Start: 2022-01-24 | End: 2022-01-28 | Stop reason: HOSPADM

## 2022-01-24 RX ORDER — CHOLESTYRAMINE 4 G/9G
1 POWDER, FOR SUSPENSION ORAL 2 TIMES DAILY
Qty: 90 PACKET | Refills: 0 | Status: SHIPPED | OUTPATIENT
Start: 2022-01-24

## 2022-01-24 RX ORDER — INSULIN GLARGINE 100 [IU]/ML
2 INJECTION, SOLUTION SUBCUTANEOUS
Qty: 10 ML | Refills: 3
Start: 2022-01-24 | End: 2022-01-25 | Stop reason: SDUPTHER

## 2022-01-24 RX ADMIN — TRAMADOL HYDROCHLORIDE 25 MG: 50 TABLET, COATED ORAL at 16:57

## 2022-01-24 RX ADMIN — ALBUMIN (HUMAN) 25 G: 0.25 INJECTION, SOLUTION INTRAVENOUS at 16:57

## 2022-01-24 RX ADMIN — ARIPIPRAZOLE 5 MG: 5 TABLET ORAL at 20:27

## 2022-01-24 RX ADMIN — HEPARIN 100 UNITS: 100 SYRINGE at 13:21

## 2022-01-24 RX ADMIN — HYDROMORPHONE HYDROCHLORIDE 0.25 MG: 1 INJECTION, SOLUTION INTRAMUSCULAR; INTRAVENOUS; SUBCUTANEOUS at 02:26

## 2022-01-24 RX ADMIN — HEPARIN SODIUM 5000 UNITS: 10000 INJECTION INTRAVENOUS; SUBCUTANEOUS at 06:09

## 2022-01-24 RX ADMIN — HYDROMORPHONE HYDROCHLORIDE 0.25 MG: 1 INJECTION, SOLUTION INTRAMUSCULAR; INTRAVENOUS; SUBCUTANEOUS at 06:33

## 2022-01-24 RX ADMIN — INSULIN GLARGINE-YFGN 1 UNITS: 100 INJECTION, SOLUTION SUBCUTANEOUS at 21:04

## 2022-01-24 RX ADMIN — METOCLOPRAMIDE HYDROCHLORIDE 5 MG: 5 TABLET ORAL at 20:31

## 2022-01-24 RX ADMIN — ROPINIROLE 0.25 MG: 0.25 TABLET, FILM COATED ORAL at 20:27

## 2022-01-24 RX ADMIN — EPOETIN ALFA-EPBX 2000 UNITS: 2000 INJECTION, SOLUTION INTRAVENOUS; SUBCUTANEOUS at 18:01

## 2022-01-24 RX ADMIN — MIRTAZAPINE 15 MG: 15 TABLET, FILM COATED ORAL at 20:27

## 2022-01-24 RX ADMIN — METOCLOPRAMIDE HYDROCHLORIDE 5 MG: 5 TABLET ORAL at 16:57

## 2022-01-24 RX ADMIN — CHOLESTYRAMINE 4 G: 4 POWDER, FOR SUSPENSION ORAL at 20:27

## 2022-01-24 RX ADMIN — LEVOTHYROXINE SODIUM 75 MCG: 0.03 TABLET ORAL at 06:09

## 2022-01-24 RX ADMIN — EPOETIN ALFA-EPBX 3000 UNITS: 3000 INJECTION, SOLUTION INTRAVENOUS; SUBCUTANEOUS at 18:01

## 2022-01-24 RX ADMIN — PANTOPRAZOLE SODIUM 40 MG: 40 TABLET, DELAYED RELEASE ORAL at 16:57

## 2022-01-24 RX ADMIN — METOCLOPRAMIDE HYDROCHLORIDE 5 MG: 5 TABLET ORAL at 06:09

## 2022-01-24 RX ADMIN — PANTOPRAZOLE SODIUM 40 MG: 40 TABLET, DELAYED RELEASE ORAL at 06:09

## 2022-01-24 RX ADMIN — INSULIN LISPRO 1 UNITS: 100 INJECTION, SOLUTION INTRAVENOUS; SUBCUTANEOUS at 21:03

## 2022-01-24 RX ADMIN — SODIUM CHLORIDE, PRESERVATIVE FREE 10 ML: 5 INJECTION INTRAVENOUS at 20:27

## 2022-01-24 RX ADMIN — PREGABALIN 75 MG: 75 CAPSULE ORAL at 13:21

## 2022-01-24 RX ADMIN — ACETAMINOPHEN 1000 MG: 500 TABLET ORAL at 13:20

## 2022-01-24 RX ADMIN — HEPARIN SODIUM 5000 UNITS: 10000 INJECTION INTRAVENOUS; SUBCUTANEOUS at 22:00

## 2022-01-24 RX ADMIN — HEPARIN SODIUM 5000 UNITS: 10000 INJECTION INTRAVENOUS; SUBCUTANEOUS at 13:21

## 2022-01-24 ASSESSMENT — PAIN SCALES - GENERAL
PAINLEVEL_OUTOF10: 0
PAINLEVEL_OUTOF10: 0
PAINLEVEL_OUTOF10: 6
PAINLEVEL_OUTOF10: 8
PAINLEVEL_OUTOF10: 0
PAINLEVEL_OUTOF10: 0

## 2022-01-24 NOTE — PROGRESS NOTES
Hospitalist Progress Note            Patient: Claudette Sutherland Age: 36 y.o.   DOA: 1/14/2022 Admit Dx / CC: DKA, type 1, not at goal [facility]) [E10.10]  Type 1 diabetes mellitus with ketoacidosis without coma ([facility] [E10.10]  Nausea and vomiting, intractability of vomiting not specified, unspecified vomiting type [R11.2]   LOS:  LOS: 9 days      Assessment/ Plan:     DKA in pt with uncontrolled DM1 and very insulin sensitive  C/w adjusted lantus insulin  Advised on small frequent meals  Advised to drink plenty of water  Cont ISS  Recent A1c 8.7     Hypokalemia, replaced     Elevated troponin in the setting of ESRD     End-stage renal disease on hemodialysis     Chronic anemia  S/p PRBCs     History of Cardiac arrest     Diabetic gastroparesis  Advised NO Narcotics as can worsen gastroparesis     History of Endocarditis    DVT ppx: heparin  Code status: Full code     Medically stable for discharge pend PT/OT    Plan of care discussed with: patient and bedside RN    Patient Active Problem List   Diagnosis    Non compliance w medication regimen    Tobacco smoking complicating pregnancy    MTHFR mutation    Generalized abdominal pain    Diabetic ketoacidosis without coma associated with type 1 diabetes mellitus ([address]    Hypertension    Prolonged QT interval    Iron deficiency anemia    Sinus tachycardia    Bladder dysfunction    Hypokalemia    Severe protein-calorie malnutrition (HCC)    Uncontrolled type 1 diabetes mellitus with hyperglycemia (HCC)    End stage renal disease ([address]    Hypothyroidism    Hyperlipidemia    Acute cystitis    Nondisplaced fracture of neck of fifth metacarpal bone, left hand, initial encounter for closed fracture    Chronic right-sided low back pain    Endocarditis    Seizure (HCC)    Hyperkalemia    Hypomagnesemia    Hypocalcemia    Intractable nausea and vomiting    Wound of left leg, sequela    Syncope    Type 1 diabetes mellitus without complication ([facility]    Hyperosmolality    Major depressive disorder    Lactic acid acidosis    Early satiety    Acute on chronic systolic CHF (congestive heart failure) (HCC)    Atypical chest pain    Chronic renal failure syndrome    Septic shock (HCC)    ESRD (end stage renal disease) (HCC)    Hyperosmolar hyperglycemic state (HHS) ([facility]    Hypertensive urgency    Nausea    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) ([facility]    ESRD (end stage renal disease) on dialysis ([facility]    AMS (altered mental status)    Opioid overdose ([facility]    Anemia    Type 1 diabetes mellitus with chronic kidney disease on chronic dialysis ([facility]    Elevated TSH    [facility]    DKA, type 1, not at goal [facility])    COVID-19 virus infection    Frequent hospital admissions    Diabetic gastroparesis (HCC)        Medications:  Reviewed    Infusion Medications    sodium chloride      dextrose Stopped (01/23/22 0915)     Scheduled Medications    traMADol  25 mg Oral Once    lidocaine  5 mL IntraDERmal Once    sodium chloride flush  5-40 mL IntraVENous 2 times per day    heparin flush  1 mL IntraVENous 2 times per day    vitamin D  50,000 Units Oral Q14 Days    insulin glargine-yfgn  2 Units SubCUTAneous QAM    insulin lispro  0-4 Units SubCUTAneous 4x Daily AC & HS    epoetin nena-epbx  2,000 Units SubCUTAneous Once per day on Mon Wed Fri    And    epoetin nena-epbx  3,000 Units SubCUTAneous Once per day on Mon Wed Fri    metoclopramide  5 mg Oral 4x Daily AC & HS    cholestyramine  1 packet Oral BID    pregabalin  75 mg Oral Daily    ARIPiprazole  5 mg Oral Nightly    levothyroxine  75 mcg Oral Daily    mirtazapine  15 mg Oral Nightly    pantoprazole  40 mg Oral BID AC    rOPINIRole  0.25 mg Oral Nightly    heparin (porcine)  5,000 Units SubCUTAneous Q8H    influenza virus vaccine  0.5 mL IntraMUSCular Prior to discharge     PRN Meds: acetaminophen, sodium chloride flush, sodium chloride, heparin flush, glucose, dextrose, glucagon (rDNA), dextrose, dextrose, polyethylene glycol    I/O    Intake/Output Summary (Last 24 hours) at 1/24/2022 1522  Last data filed at 1/24/2022 1017  Gross per 24 hour   Intake 300 ml   Output 3800 ml   Net -3500 ml       Labs:   Recent Labs     01/22/22  1635   WBC 6.0   HGB 7.4*   HCT 23.1*   PLT 97*       Recent Labs     01/22/22  1454 01/22/22  1454 01/22/22  1635 01/22/22  1635 01/22/22  1635 01/23/22  0600 01/24/22  1249      < > 132  --   --  126* 136   K 5.6*   < > 4.4   < > 4.27 5.3* 4.1      < > 100  --   --  98 101   CO2 21*   < > 25  --   --  20* 28   BUN 12   < > 13  --   --  14 8   CREATININE 2.8*   < > 2.9*  --   --  3.1* 2.0*   CALCIUM 7.8*   < > 7.4*  --   --  7.5* 7.6*   PHOS 2.3*  --  2.1*  --   --  2.4*  --     < > = values in this interval not displayed. Recent Labs     01/22/22  1454   PROT 5.4*   ALKPHOS 177*   ALT 9   AST 18   BILITOT 0.3       No results for input(s): INR in the last 72 hours. No results for input(s): Randee Dorsey in the last 72 hours. Chronic labs:  Lab Results   Component Value Date    CHOL 95 01/14/2020    TRIG 82 01/14/2020    HDL 33 01/14/2020    LDLCALC 46 01/14/2020    TSH 4.710 (H) 01/23/2022    INR 1.1 12/08/2021    LABA1C 8.7 (H) 12/08/2021       Radiology:  Imaging studies reviewed today. Subjective:     Wilton ANDREW Flores c/o pain all over and weakness    Objective:     Physical Exam:   BP (!) 135/97   Pulse 108   Temp 97.2 °F (36.2 °C) (Temporal)   Resp 18   Ht 5' 4\" (1.626 m)   Wt 146 lb 2.6 oz (66.3 kg)   SpO2 97%   BMI 25.09 kg/m²     General appearance:in no distress, flat in bed sleeping on her stomach with covers over her head and seemed to be comfortable until I woke her up and started c/o pain  Lungs: Clear to auscultation bilaterally, no wheezing or crackles   Heart: Regular rate and rhythm, S1, S2 normal   Abdomen: Soft, non-tender and not-distended.  Bowel sounds normal. Extremities: no edema   Neurologic: Grossly normal and non focal, CN II-XII intact       Noman Mcmahon MD   Hospitalist Service   01/24/22 3:22 PM

## 2022-01-24 NOTE — PROGRESS NOTES
Department of Internal Medicine  Nephrology Progress Note    Events Reviewed. SUBJECTIVE:  We are following Ms. Khadijah Buckner for ESRD on HD. Reports no complaints.     PHYSICAL EXAM:      Vitals:    VITALS:  /71   Pulse 110   Temp 98.2 °F (36.8 °C)   Resp 18   Ht 5' 4\" (1.626 m)   Wt 146 lb 2.6 oz (66.3 kg)   SpO2 100%   BMI 25.09 kg/m²   24HR INTAKE/OUTPUT:      Intake/Output Summary (Last 24 hours) at 1/24/2022 1201  Last data filed at 1/24/2022 1017  Gross per 24 hour   Intake 540 ml   Output 3800 ml   Net -3260 ml       Access: RIJ tunneled dialysis catheter  Constitutional:  Lethargic, but awakens to voice  HEENT:  PERRL, normocephalic, atraumatic  Respiratory:  CTA bilaterally  Cardiovascular/Edema:  ST, RRR, no murmur gallop or rub  Gastrointestinal:  abd distended, c/o persistent abdominal pain  Neurologic: Alert and oriented, no focal deficit  Skin:  Warm, dry, ecchymotic areas to abdomen    Scheduled Meds:   lidocaine  5 mL IntraDERmal Once    sodium chloride flush  5-40 mL IntraVENous 2 times per day    heparin flush  1 mL IntraVENous 2 times per day    vitamin D  50,000 Units Oral Q14 Days    insulin glargine-yfgn  2 Units SubCUTAneous QAM    insulin lispro  0-4 Units SubCUTAneous 4x Daily AC & HS    epoetin nena-epbx  2,000 Units SubCUTAneous Once per day on Mon Wed Fri    And    epoetin nena-epbx  3,000 Units SubCUTAneous Once per day on Mon Wed Fri    metoclopramide  5 mg Oral 4x Daily AC & HS    cholestyramine  1 packet Oral BID    pregabalin  75 mg Oral Daily    ARIPiprazole  5 mg Oral Nightly    levothyroxine  75 mcg Oral Daily    mirtazapine  15 mg Oral Nightly    pantoprazole  40 mg Oral BID AC    rOPINIRole  0.25 mg Oral Nightly    heparin (porcine)  5,000 Units SubCUTAneous Q8H    influenza virus vaccine  0.5 mL IntraMUSCular Prior to discharge     Continuous Infusions:   sodium chloride      dextrose Stopped (01/23/22 0915)     PRN Meds:.acetaminophen, sodium chloride flush, sodium chloride, heparin flush, glucose, dextrose, glucagon (rDNA), dextrose, dextrose, polyethylene glycol    DATA:    CBC with Differential:    Lab Results   Component Value Date    WBC 6.0 01/22/2022    RBC 2.88 01/22/2022    HGB 7.4 01/22/2022    HCT 23.1 01/22/2022    PLT 97 01/22/2022    MCV 80.2 01/22/2022    MCH 25.7 01/22/2022    MCHC 32.0 01/22/2022    RDW 20.1 01/22/2022    NRBC 0.9 08/10/2020    SEGSPCT 67 06/27/2013    METASPCT 1.7 08/10/2020    LYMPHOPCT 29.3 01/22/2022    MONOPCT 6.5 01/22/2022    MYELOPCT 0.9 01/19/2022    BASOPCT 0.7 01/22/2022    MONOSABS 0.39 01/22/2022    LYMPHSABS 1.75 01/22/2022    EOSABS 0.07 01/22/2022    BASOSABS 0.04 01/22/2022     CMP:    Lab Results   Component Value Date     01/23/2022    K 5.3 01/23/2022    K 4.2 11/24/2021    CL 98 01/23/2022    CO2 20 01/23/2022    BUN 14 01/23/2022    CREATININE 3.1 01/23/2022    GFRAA 21 01/23/2022    LABGLOM 21 01/23/2022    GLUCOSE 132 01/23/2022    GLUCOSE 130 05/18/2012    PROT 5.4 01/22/2022    LABALBU 2.5 01/22/2022    LABALBU 4.1 05/18/2012    CALCIUM 7.5 01/23/2022    BILITOT 0.3 01/22/2022    ALKPHOS 177 01/22/2022    AST 18 01/22/2022    ALT 9 01/22/2022     Magnesium:    Lab Results   Component Value Date    MG 1.7 01/23/2022     Phosphorus:    Lab Results   Component Value Date    PHOS 2.4 01/23/2022     Radiology Review:                Chest Xray 1/14/22   Suspect early bibasilar infiltrates. BRIEF SUMMARY OF INITIAL CONSULT:     Briefly, Miss Kristine Plasencia is a 36year-old female with a PMH of ESRD on home hemodialysis 5 days/wk, (initiated September 2021, Type I DM, poorly controlled with recurrent admissions for DKA, HTN, gastroparesis, ascites, infective endocarditis, cardiac arrest, hypothyroidism and depression. She was recently admitted earlier this month after testing positive for COVID 19.  She presented to the ER on January 14th, 2022 with complaints of nausea and vomiting for one month and hyperglycemia. She states she has not been compliant with insulin therapy recently. In the ER she was found to be in DKA with blood sugar of 400 And beta hydroxy greater than 4.5. Other pertinent lab work revealed sodium 132, chloride 92, anion gap 19, BUN 13, Creatinine 4.5, lactic acid 2.3. She was started on DKA protocol and IVFs in the ER. Renal is consulted for ESRD on HD and DKA. Problems Resolved:   · DKA,  beta hydroxy greater than 4.5. S/P insulin drip. Endocrinology following   · Hypokalemia, 2/2 GI losses from vomiting and poor oral intake    IMPRESSION/RECOMMENDATIONS:       1. ESRD on home hemodialysis 4 days/wk (per patient) via RIJ tunneled dialysis catheter. To continue HD MWF while inpatient. Hyperkalemic yesterday with 2 hours of HD. 2. Hyponatremia, 2/2 decreased free water excretion from ESRD. 1L fluid restriction. 3. HTN, on lopressor and amlodipine   4. Hypophosphatemia, 2/2 poor intake, levels improved   5. Hypocalcemia, 2/2 vitamin D deficiency on CKD. To Replace  6. Vitamin D deficiency, continue ergocalciferol  7. 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 Anemia of CKD, iron level 40, iron saturation 77%, on alpha to 5,000 unit 3 times/wk, status post transfusion  9. Type I DM, poorly controlled with frequent readmissions for DKA  ------------------------------------------------------  10. Hx Covid 19, unvaccinated  11. Restless leg syndrome, on ropinirole  12. Depression, on Abilify  13. Hypothyroidism, on levothyroxine  14. Obtain ionized ionized  15. History of gastroparesis, on metoclopramide, GI consult following.   16. Nutrition, clear liquid diet     Plan:     · HD today (3 times a week M-W-F)   · Continue epoetin alpha 5,000 units 3 times/wk  · Continue ergocalciferol 50,000 units every 2 weeks  · Discharge planning.        Electronically signed by Shalom Peña on 1/24/2022 at 12:01 PM

## 2022-01-24 NOTE — PROGRESS NOTES
ENDOCRINOLOGY PROGRESS NOTE      Date of admission: 1/14/2022  Date of service: 1/24/2022  Admitting physician: Roxana Cameron DO   Primary Care Physician: Drake Casillas MD  Consultant physician: Amber Casanova MD     Reason for the consultation:  Uncontrolled DM,labile BG    History of Present Illness: The history is provided by the patient. Accuracy of the patient data is excellent    [address] is a very pleasant 36 y.o. old female with PMH of Type I DM, ESRD on PD,HTN,hypothyroidism, infective endocarditis and other listed below admitted to [facility] on 1/14/2022 because of N/V, abdominal pain and found to be in DKA. Endocrine service was consulted for DM management. Subjective   Seen and examined this AM, BG at goal. Has not required any ISS.      Point of care glucose monitoring (Independently reviewed)   Recent Labs     01/23/22  0223 01/23/22  0604 01/23/22  0949 01/23/22  1059 01/23/22  1630 01/23/22  1955 01/23/22  2255 01/24/22  0542   GLUMET 131* 119* 114* 105* 154* 89 102* 74       Scheduled Meds:   lidocaine  5 mL IntraDERmal Once    sodium chloride flush  5-40 mL IntraVENous 2 times per day    heparin flush  1 mL IntraVENous 2 times per day    vitamin D  50,000 Units Oral Q14 Days    insulin glargine-yfgn  2 Units SubCUTAneous QAM    insulin lispro  0-4 Units SubCUTAneous 4x Daily AC & HS    epoetin nena-epbx  2,000 Units SubCUTAneous Once per day on Mon Wed Fri    And    epoetin nena-epbx  3,000 Units SubCUTAneous Once per day on Mon Wed Fri    metoclopramide  5 mg Oral 4x Daily AC & HS    cholestyramine  1 packet Oral BID    pregabalin  75 mg Oral Daily    ARIPiprazole  5 mg Oral Nightly    levothyroxine  75 mcg Oral Daily    mirtazapine  15 mg Oral Nightly    pantoprazole  40 mg Oral BID AC    rOPINIRole  0.25 mg Oral Nightly    heparin (porcine)  5,000 Units SubCUTAneous Q8H    influenza virus vaccine  0.5 mL IntraMUSCular Prior to discharge     PRN Meds:   sodium chloride flush, 5-40 mL, PRN  sodium chloride, 25 mL, PRN  heparin flush, 1 mL, PRN  acetaminophen, 650 mg, Q4H PRN  HYDROmorphone, 0.25 mg, Q4H PRN  glucose, 15 g, PRN  dextrose, 12.5 g, PRN  glucagon (rDNA), 1 mg, PRN  dextrose, 100 mL/hr, PRN  potassium chloride, 40 mEq, PRN   Or  potassium alternative oral replacement, 40 mEq, PRN   Or  potassium chloride, 10 mEq, PRN  dextrose, 12.5 g, PRN  polyethylene glycol, 17 g, Daily PRN      Continuous Infusions:   sodium chloride      dextrose Stopped (01/23/22 0915)       Review of Systems  All systems reviewed. All negative except for symptoms mentioned in HPI     OBJECTIVE    BP (!) 94/46   Pulse 118   Temp 98.2 °F (36.8 °C)   Resp 18   Ht 5' 4\" (1.626 m)   Wt 156 lb 4.9 oz (70.9 kg)   SpO2 100%   BMI 26.83 kg/m²     Intake/Output Summary (Last 24 hours) at 1/24/2022 0942  Last data filed at 1/23/2022 1857  Gross per 24 hour   Intake 240 ml   Output 1800 ml   Net -1560 ml       Physical examination:   General: awake alert, oriented x3, no abnormal position or movements. HEENT: normocephalic non-traumatic, no exophthalmos   Neck: supple  Pulm: Clear equal air entry no added sounds, no wheezing or rhonchi    Chest: Vascular access Rt. Chest. Lt chest scar. CVS: S1 + S2, no murmur  Abd: soft lax, nontender. Normal bowel sounds  Skin: warm, no lesions, no rash.     Neuro: CN intact, muscle power normal  Psych: normal mood, and affect    Review of Laboratory Data:  I personally reviewed the following labs:   Recent Labs     01/22/22  1635   WBC 6.0   RBC 2.88*   HGB 7.4*   HCT 23.1*   MCV 80.2   MCH 25.7*   MCHC 32.0   RDW 20.1*   PLT 97*   MPV 11.0     Recent Labs     01/22/22  1454 01/22/22  1454 01/22/22  1635 01/22/22  1635 01/23/22  0600     --  132  --  126*   K 5.6*   < > 4.4 4.27 5.3*     --  100  --  98   CO2 21*  --  25  --  20*   BUN 12  --  13  --  14   CREATININE 2.8*  --  2.9*  --  3.1*   GLUCOSE 81  --  144*  --  132*   CALCIUM 7.8* --  7.4*  --  7.5*   PROT 5.4*  --   --   --   --    LABALBU 2.5*  --   --   --   --    BILITOT 0.3  --   --   --   --    ALKPHOS 177*  --   --   --   --    AST 18  --   --   --   --    ALT 9  --   --   --   --     < > = values in this interval not displayed. Beta-Hydroxybutyrate   Date Value Ref Range Status   01/14/2022 >4.50 (H) 0.02 - 0.27 mmol/L Final   12/31/2021 >4.50 (H) 0.02 - 0.27 mmol/L Final   11/22/2021 0.53 (H) 0.02 - 0.27 mmol/L Final     Lab Results   Component Value Date    LABA1C 8.7 12/08/2021    LABA1C 10.2 11/23/2021    LABA1C 10.6 09/28/2021     Lab Results   Component Value Date/Time    TSH 4.710 (H) 01/23/2022 06:00 AM    T4FREE 1.16 11/24/2021 08:52 AM    G9YGGJZ 8.6 11/05/2015 02:20 AM    FT3 1.3 (L) 10/31/2020 10:43 AM    R8RQMQZ 36.73 (L) 07/20/2020 02:00 PM     Lab Results   Component Value Date    LABA1C 8.7 12/08/2021    GLUCOSE 132 01/23/2022    GLUCOSE 130 05/18/2012    MALBCR 2949.3 01/15/2020    LABMICR 1740.1 01/15/2020    LABCREA 59 01/15/2020    LABCREA 61 01/15/2020     Lab Results   Component Value Date    TRIG 82 01/14/2020    HDL 33 01/14/2020    LDLCALC 46 01/14/2020    CHOL 95 01/14/2020       Blood culture   Lab Results   Component Value Date    BC 5 Days no growth 01/01/2022    BC 5 Days no growth 11/22/2021       Radiology:  CT HEAD WO CONTRAST   Final Result   No acute intracranial abnormality. XR CHEST PORTABLE   Final Result   Right lower lobe atelectasis versus infiltrate. XR CHEST PORTABLE   Final Result   Suspect early bibasilar infiltrates. Medical Records/Labs/Images review:   I personally reviewed and summarized previous records   All labs and imaging were reviewed independently     [address], a 36 y.o.-old female seen today for inpatient diabetes management     Diabetes Mellitus type I    · Extremely insulin sensitive.  Pt with DM type 1, need long acting insulin all the time to prevent DKA   · We recommend the following diabetes regimen    · Lantus 1 unit BID  · modified Low dose sliding scale 1:80>180 with meals and at night    · Continue glucose check with meals and at bedtime   · Will titrate insulin dose based on the blood glucose trend & insulin requirement  · We will arrange for the patient to follow with us after discharge    Primary Hypothyroidism  · On levothyroxine 75 mcg daily. She was missing doses   · To recheck TFT 6-8 weeks after discharge     N/V/ Diarrhea  · Resolved  · Symptomatic Management per primary and GI services    Acute on Chronic Anemia  · S/p 1U PRBC transfusion    ESRD  · Pt at risk for hypoglycemia  · On dialysis, nephrology following    The above issues were reviewed with the patient who understood and agreed with the plan. Thank you for allowing us to participate in the care of this patient. Please do not hesitate to contact us with any additional questions. Dede Coley MD  Endocrinologist, [facility] [[email or web address]] Diabetes Care and Endocrinology   [address] [place] [address]   Phone: [phone]  Fax: [phone]  ----------------------------  An electronic signature was used to authenticate this note.  Hussein Leiva MD on 1/24/2022 at 9:42 AM

## 2022-01-24 NOTE — FLOWSHEET NOTE
01/24/22 1017   Vital Signs   /71   Temp 98.2 °F (36.8 °C)   Pulse 110   Resp 18   Weight 146 lb 2.6 oz (66.3 kg)   Weight Method Bed scale   Percent Weight Change -6.49   Pain Assessment   Pain Assessment 0-10   Pain Level 0   Post-Hemodialysis Assessment   Post-Treatment Procedures Blood returned;Catheter capped, clamped and heparinized x 2 ports   Machine Disinfection Process Exterior Machine Disinfection   Rinseback Volume (ml) 300 ml   Total Liters Processed (l/min) 51.8 l/min   Dialyzer Clearance Lightly streaked   Duration of Treatment (minutes) 180 minutes   Heparin amount administered during treatment (units) 0 units   Hemodialysis Intake (ml) 300 ml   Hemodialysis Output (ml) 2000 ml   NET Removed (ml) 1700 ml   Tolerated Treatment Good   Patient Response to Treatment tolerated well, profile B, refill noted, 1700 cc fluid removal   Bilateral Breath Sounds Diminished   Edema None   Physician Notified?  No

## 2022-01-24 NOTE — PROGRESS NOTES
Patient returned to floor.  Left voicemail for Mann Mendoza [address], clinical dietician to let her know that she is back so she can see hr to discuss the gastroparesis diet

## 2022-01-24 NOTE — CONSULTS
Noted consult for gastroparesis. Nutrition assessment completed, see most recent RD progress note for full detail. Pt very familiar to registered dietitians throughout [facility] system, has been educated on several occasions. Will add options for small, frequent meals during hospital admit. This may prove to be difficult for kitchen staff given her current restrictive diet order and mid-meal snack options available. Will provide option for pt to call  for mid-meal snacks, consisting of 1 CHO/1 pro option. Again, this will be much more easily achieved once pt is discharged from hospital setting. Will continue to follow as scheduled.     Thank you  Electronically signed by Osmel Barker MS, RD, LD on 1/24/2022 at 3:44 PM

## 2022-01-24 NOTE — PROGRESS NOTES
Patient called for assistance from restroom. Patient ambulated without assistance to restroom. Patient was unable to get off the toilet by herself. Patient returned to bed safely with this RN and nursing assistant using the oksana steady.  Patient educated on use of call light, verbalized understanding

## 2022-01-24 NOTE — PROGRESS NOTES
Physical Therapy    PT order received and medical chart reviewed 1/24 PM. Attempted PT evaluation however pt was recently incontinent and wished to get cleaned up with nursing before attempting to ambulate. Nursing notified. Will re-attempt tomorrow. Thank you.     Jg Cantrell, PT, DPT  [id number]

## 2022-01-24 NOTE — PROGRESS NOTES
CLINICAL PHARMACY NOTE: MEDS TO BEDS    Total # of Prescriptions Filled: 1   The following medications were delivered to the patient:  · Cholestyramine 4 gram    Additional Documentation:  Delivered meds to patient @11:55 am

## 2022-01-25 ENCOUNTER — APPOINTMENT (OUTPATIENT)
Dept: NUCLEAR MEDICINE | Age: 32
DRG: 420 | End: 2022-01-25
Payer: COMMERCIAL

## 2022-01-25 PROBLEM — R52 DIFFUSE PAIN: Status: ACTIVE | Noted: 2022-01-25

## 2022-01-25 LAB
ANION GAP SERPL CALCULATED.3IONS-SCNC: 8 MMOL/L (ref 7–16)
ANISOCYTOSIS: ABNORMAL
BASOPHILS ABSOLUTE: 0 E9/L (ref 0–0.2)
BASOPHILS RELATIVE PERCENT: 0.7 % (ref 0–2)
BUN BLDV-MCNC: 11 MG/DL (ref 6–20)
CALCIUM SERPL-MCNC: 7.8 MG/DL (ref 8.6–10.2)
CHLORIDE BLD-SCNC: 103 MMOL/L (ref 98–107)
CO2: 25 MMOL/L (ref 22–29)
CREAT SERPL-MCNC: 2.6 MG/DL (ref 0.5–1)
EOSINOPHILS ABSOLUTE: 0.04 E9/L (ref 0.05–0.5)
EOSINOPHILS RELATIVE PERCENT: 0.9 % (ref 0–6)
GFR AFRICAN AMERICAN: 26
GFR NON-AFRICAN AMERICAN: 26 ML/MIN/1.73
GLUCOSE BLD-MCNC: 40 MG/DL (ref 74–99)
HCT VFR BLD CALC: 25.5 % (ref 34–48)
HEMOGLOBIN: 8.4 G/DL (ref 11.5–15.5)
HYPOCHROMIA: ABNORMAL
LYMPHOCYTES ABSOLUTE: 1.08 E9/L (ref 1.5–4)
LYMPHOCYTES RELATIVE PERCENT: 25.2 % (ref 20–42)
MAGNESIUM: 1.6 MG/DL (ref 1.6–2.6)
MCH RBC QN AUTO: 26.8 PG (ref 26–35)
MCHC RBC AUTO-ENTMCNC: 32.9 % (ref 32–34.5)
MCV RBC AUTO: 81.2 FL (ref 80–99.9)
METER GLUCOSE: 140 MG/DL (ref 74–99)
METER GLUCOSE: 161 MG/DL (ref 74–99)
METER GLUCOSE: 176 MG/DL (ref 74–99)
METER GLUCOSE: 309 MG/DL (ref 74–99)
METER GLUCOSE: 52 MG/DL (ref 74–99)
MONOCYTES ABSOLUTE: 0.09 E9/L (ref 0.1–0.95)
MONOCYTES RELATIVE PERCENT: 1.7 % (ref 2–12)
NEUTROPHILS ABSOLUTE: 3.1 E9/L (ref 1.8–7.3)
NEUTROPHILS RELATIVE PERCENT: 72.2 % (ref 43–80)
OVALOCYTES: ABNORMAL
PDW BLD-RTO: 19.3 FL (ref 11.5–15)
PLATELET # BLD: 82 E9/L (ref 130–450)
PLATELET CONFIRMATION: NORMAL
PMV BLD AUTO: ABNORMAL FL (ref 7–12)
POIKILOCYTES: ABNORMAL
POLYCHROMASIA: ABNORMAL
POTASSIUM SERPL-SCNC: 3.8 MMOL/L (ref 3.5–5)
RBC # BLD: 3.14 E12/L (ref 3.5–5.5)
SODIUM BLD-SCNC: 136 MMOL/L (ref 132–146)
TARGET CELLS: ABNORMAL
WBC # BLD: 4.3 E9/L (ref 4.5–11.5)

## 2022-01-25 PROCEDURE — 6370000000 HC RX 637 (ALT 250 FOR IP): Performed by: INTERNAL MEDICINE

## 2022-01-25 PROCEDURE — 99232 SBSQ HOSP IP/OBS MODERATE 35: CPT | Performed by: INTERNAL MEDICINE

## 2022-01-25 PROCEDURE — 97161 PT EVAL LOW COMPLEX 20 MIN: CPT

## 2022-01-25 PROCEDURE — 85025 COMPLETE CBC W/AUTO DIFF WBC: CPT

## 2022-01-25 PROCEDURE — 3430000000 HC RX DIAGNOSTIC RADIOPHARMACEUTICAL: Performed by: RADIOLOGY

## 2022-01-25 PROCEDURE — 97530 THERAPEUTIC ACTIVITIES: CPT

## 2022-01-25 PROCEDURE — 2580000003 HC RX 258: Performed by: INTERNAL MEDICINE

## 2022-01-25 PROCEDURE — 6360000002 HC RX W HCPCS: Performed by: FAMILY MEDICINE

## 2022-01-25 PROCEDURE — 6370000000 HC RX 637 (ALT 250 FOR IP): Performed by: FAMILY MEDICINE

## 2022-01-25 PROCEDURE — 80048 BASIC METABOLIC PNL TOTAL CA: CPT

## 2022-01-25 PROCEDURE — 83735 ASSAY OF MAGNESIUM: CPT

## 2022-01-25 PROCEDURE — 6370000000 HC RX 637 (ALT 250 FOR IP): Performed by: STUDENT IN AN ORGANIZED HEALTH CARE EDUCATION/TRAINING PROGRAM

## 2022-01-25 PROCEDURE — 82962 GLUCOSE BLOOD TEST: CPT

## 2022-01-25 PROCEDURE — 6360000002 HC RX W HCPCS: Performed by: INTERNAL MEDICINE

## 2022-01-25 PROCEDURE — 78278 ACUTE GI BLOOD LOSS IMAGING: CPT | Performed by: RADIOLOGY

## 2022-01-25 PROCEDURE — 2060000000 HC ICU INTERMEDIATE R&B

## 2022-01-25 PROCEDURE — 2500000003 HC RX 250 WO HCPCS: Performed by: EMERGENCY MEDICINE

## 2022-01-25 PROCEDURE — A9560 TC99M LABELED RBC: HCPCS | Performed by: RADIOLOGY

## 2022-01-25 PROCEDURE — 78278 ACUTE GI BLOOD LOSS IMAGING: CPT

## 2022-01-25 PROCEDURE — 36415 COLL VENOUS BLD VENIPUNCTURE: CPT

## 2022-01-25 RX ORDER — INSULIN GLARGINE-YFGN 100 [IU]/ML
1 INJECTION, SOLUTION SUBCUTANEOUS EVERY MORNING
Status: DISCONTINUED | OUTPATIENT
Start: 2022-01-26 | End: 2022-01-28 | Stop reason: HOSPADM

## 2022-01-25 RX ORDER — INSULIN GLARGINE 100 [IU]/ML
1 INJECTION, SOLUTION SUBCUTANEOUS 2 TIMES DAILY
Qty: 10 ML | Refills: 3
Start: 2022-01-25 | End: 2022-01-26 | Stop reason: SDUPTHER

## 2022-01-25 RX ADMIN — PANTOPRAZOLE SODIUM 40 MG: 40 TABLET, DELAYED RELEASE ORAL at 17:04

## 2022-01-25 RX ADMIN — INSULIN LISPRO 2 UNITS: 100 INJECTION, SOLUTION INTRAVENOUS; SUBCUTANEOUS at 21:53

## 2022-01-25 RX ADMIN — CHOLESTYRAMINE 4 G: 4 POWDER, FOR SUSPENSION ORAL at 21:47

## 2022-01-25 RX ADMIN — PANTOPRAZOLE SODIUM 40 MG: 40 TABLET, DELAYED RELEASE ORAL at 06:00

## 2022-01-25 RX ADMIN — CHOLESTYRAMINE 4 G: 4 POWDER, FOR SUSPENSION ORAL at 08:51

## 2022-01-25 RX ADMIN — MIRTAZAPINE 15 MG: 15 TABLET, FILM COATED ORAL at 21:50

## 2022-01-25 RX ADMIN — METOCLOPRAMIDE HYDROCHLORIDE 5 MG: 5 TABLET ORAL at 17:03

## 2022-01-25 RX ADMIN — SODIUM CHLORIDE, PRESERVATIVE FREE 10 ML: 5 INJECTION INTRAVENOUS at 22:00

## 2022-01-25 RX ADMIN — ARIPIPRAZOLE 5 MG: 5 TABLET ORAL at 21:49

## 2022-01-25 RX ADMIN — HEPARIN 100 UNITS: 100 SYRINGE at 08:50

## 2022-01-25 RX ADMIN — DEXTROSE MONOHYDRATE 12.5 G: 25 INJECTION, SOLUTION INTRAVENOUS at 06:39

## 2022-01-25 RX ADMIN — ROPINIROLE 0.25 MG: 0.25 TABLET, FILM COATED ORAL at 21:48

## 2022-01-25 RX ADMIN — ACETAMINOPHEN 1000 MG: 500 TABLET ORAL at 02:32

## 2022-01-25 RX ADMIN — METOCLOPRAMIDE HYDROCHLORIDE 5 MG: 5 TABLET ORAL at 06:00

## 2022-01-25 RX ADMIN — METOCLOPRAMIDE HYDROCHLORIDE 5 MG: 5 TABLET ORAL at 11:40

## 2022-01-25 RX ADMIN — SODIUM CHLORIDE, PRESERVATIVE FREE 10 ML: 5 INJECTION INTRAVENOUS at 08:51

## 2022-01-25 RX ADMIN — HEPARIN SODIUM 5000 UNITS: 10000 INJECTION INTRAVENOUS; SUBCUTANEOUS at 06:00

## 2022-01-25 RX ADMIN — Medication 27 MILLICURIE: at 12:40

## 2022-01-25 RX ADMIN — METOCLOPRAMIDE HYDROCHLORIDE 5 MG: 5 TABLET ORAL at 21:51

## 2022-01-25 RX ADMIN — LEVOTHYROXINE SODIUM 75 MCG: 0.03 TABLET ORAL at 05:19

## 2022-01-25 RX ADMIN — HEPARIN SODIUM 5000 UNITS: 10000 INJECTION INTRAVENOUS; SUBCUTANEOUS at 21:51

## 2022-01-25 RX ADMIN — PREGABALIN 75 MG: 75 CAPSULE ORAL at 08:51

## 2022-01-25 ASSESSMENT — PAIN SCALES - WONG BAKER: WONGBAKER_NUMERICALRESPONSE: 0

## 2022-01-25 ASSESSMENT — PAIN DESCRIPTION - LOCATION: LOCATION: GENERALIZED

## 2022-01-25 ASSESSMENT — PAIN DESCRIPTION - PAIN TYPE: TYPE: ACUTE PAIN

## 2022-01-25 ASSESSMENT — PAIN SCALES - GENERAL
PAINLEVEL_OUTOF10: 8
PAINLEVEL_OUTOF10: 8

## 2022-01-25 NOTE — PROGRESS NOTES
Department of Internal Medicine  Nephrology Progress Note    Events Reviewed. Discharge held due to low Hb    SUBJECTIVE:  We are following Ms. Khadijah Buckner for ESRD on HD. Reports no complaints.     PHYSICAL EXAM:      Vitals:    VITALS:  BP (!) 155/99   Pulse 104   Temp 97.1 °F (36.2 °C) (Temporal)   Resp 16   Ht 5' 4\" (1.626 m)   Wt 149 lb 14.4 oz (68 kg)   SpO2 97%   BMI 25.73 kg/m²   24HR INTAKE/OUTPUT:      Intake/Output Summary (Last 24 hours) at 1/25/2022 1110  Last data filed at 1/25/2022 0105  Gross per 24 hour   Intake 490 ml   Output --   Net 490 ml       Access: RIJ tunneled dialysis catheter  Constitutional:  Lethargic, but awakens to voice  HEENT:  PERRL, normocephalic, atraumatic  Respiratory:  CTA bilaterally  Cardiovascular/Edema:  ST, RRR, no murmur gallop or rub  Gastrointestinal:  abd distended, c/o persistent abdominal pain  Neurologic: Alert and oriented, no focal deficit  Skin:  Warm, dry, ecchymotic areas to abdomen    Scheduled Meds:   insulin glargine-yfgn  1 Units SubCUTAneous BID    lidocaine  5 mL IntraDERmal Once    sodium chloride flush  5-40 mL IntraVENous 2 times per day    heparin flush  1 mL IntraVENous 2 times per day    vitamin D  50,000 Units Oral Q14 Days    insulin lispro  0-4 Units SubCUTAneous 4x Daily AC & HS    epoetin nena-epbx  2,000 Units SubCUTAneous Once per day on Mon Wed Fri    And    epoetin nena-epbx  3,000 Units SubCUTAneous Once per day on Mon Wed Fri    metoclopramide  5 mg Oral 4x Daily AC & HS    cholestyramine  1 packet Oral BID    pregabalin  75 mg Oral Daily    ARIPiprazole  5 mg Oral Nightly    levothyroxine  75 mcg Oral Daily    mirtazapine  15 mg Oral Nightly    pantoprazole  40 mg Oral BID AC    rOPINIRole  0.25 mg Oral Nightly    heparin (porcine)  5,000 Units SubCUTAneous Q8H    influenza virus vaccine  0.5 mL IntraMUSCular Prior to discharge     Continuous Infusions:   sodium chloride      sodium chloride      dextrose Stopped (01/23/22 0915)     PRN Meds:.acetaminophen, sodium chloride, sodium chloride flush, sodium chloride, heparin flush, glucose, dextrose, glucagon (rDNA), dextrose, dextrose, polyethylene glycol    DATA:    CBC with Differential:    Lab Results   Component Value Date    WBC 4.3 01/25/2022    RBC 3.14 01/25/2022    HGB 8.4 01/25/2022    HCT 25.5 01/25/2022    PLT 82 01/25/2022    MCV 81.2 01/25/2022    MCH 26.8 01/25/2022    MCHC 32.9 01/25/2022    RDW 19.3 01/25/2022    NRBC 0.9 08/10/2020    SEGSPCT 67 06/27/2013    METASPCT 1.7 08/10/2020    LYMPHOPCT 25.2 01/25/2022    MONOPCT 1.7 01/25/2022    MYELOPCT 0.9 01/19/2022    BASOPCT 0.7 01/25/2022    MONOSABS 0.09 01/25/2022    LYMPHSABS 1.08 01/25/2022    EOSABS 0.04 01/25/2022    BASOSABS 0.00 01/25/2022     CMP:    Lab Results   Component Value Date     01/25/2022    K 3.8 01/25/2022    K 4.2 11/24/2021     01/25/2022    CO2 25 01/25/2022    BUN 11 01/25/2022    CREATININE 2.6 01/25/2022    GFRAA 26 01/25/2022    LABGLOM 26 01/25/2022    GLUCOSE 40 01/25/2022    GLUCOSE 130 05/18/2012    PROT 5.4 01/22/2022    LABALBU 2.5 01/22/2022    LABALBU 4.1 05/18/2012    CALCIUM 7.8 01/25/2022    BILITOT 0.3 01/22/2022    ALKPHOS 177 01/22/2022    AST 18 01/22/2022    ALT 9 01/22/2022     Magnesium:    Lab Results   Component Value Date    MG 1.6 01/25/2022     Phosphorus:    Lab Results   Component Value Date    PHOS 2.4 01/23/2022     Radiology Review:                Chest Xray 1/14/22   Suspect early bibasilar infiltrates. BRIEF SUMMARY OF INITIAL CONSULT:     Briefly, Miss Fabio Gibbs is a 36year-old female with a PMH of ESRD on home hemodialysis 5 days/wk, (initiated September 2021, Type I DM, poorly controlled with recurrent admissions for DKA, HTN, gastroparesis, ascites, infective endocarditis, cardiac arrest, hypothyroidism and depression. She was recently admitted earlier this month after testing positive for COVID 19.  She presented to the ER on January 14th, 2022 with complaints of nausea and vomiting for one month and hyperglycemia. She states she has not been compliant with insulin therapy recently. In the ER she was found to be in DKA with blood sugar of 400 And beta hydroxy greater than 4.5. Other pertinent lab work revealed sodium 132, chloride 92, anion gap 19, BUN 13, Creatinine 4.5, lactic acid 2.3. She was started on DKA protocol and IVFs in the ER. Renal is consulted for ESRD on HD and DKA. Problems Resolved:   · DKA,  beta hydroxy greater than 4.5. S/P insulin drip. Endocrinology following   · Hypokalemia, 2/2 GI losses from vomiting and poor oral intake    IMPRESSION/RECOMMENDATIONS:       1. ESRD on home hemodialysis 4 days/wk (per patient) via RIJ tunneled dialysis catheter. To continue HD MWF while inpatient. Hyperkalemic yesterday with 2 hours of HD. 2. Hyponatremia, 2/2 decreased free water excretion from ESRD. 1L fluid restriction. 3. HTN, on lopressor and amlodipine   4. Hypophosphatemia, 2/2 poor intake, levels improved   5. Hypocalcemia, 2/2 vitamin D deficiency on CKD. To Replace  6. Vitamin D deficiency, continue ergocalciferol  7. 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 Anemia of CKD, iron level 40, iron saturation 77%, on alpha to 5,000 unit 3 times/wk, status post transfusion  9. Type I DM, poorly controlled with frequent readmissions for DKA  ------------------------------------------------------  10. Hx Covid 19, unvaccinated  11. Restless leg syndrome, on ropinirole  12. Depression, on Abilify  13. Hypothyroidism, on levothyroxine  14. Obtain ionized ionized  15. History of gastroparesis, on metoclopramide, GI consult following.   16. Nutrition, clear liquid diet  17.  Anemia ?gi bleed s/p one unit prbc     Plan:     · HD tomorrow (3 times a week M-W-F)   · Continue epoetin alpha 5,000 units 3 times/wk  · Continue ergocalciferol 50,000 units every 2 weeks  · Check bleeding scan      Electronically signed by Chad Mendez MD on 1/25/2022 at 11:10 AM

## 2022-01-25 NOTE — PROGRESS NOTES
ENDOCRINOLOGY PROGRESS NOTE      Date of admission: 1/14/2022  Date of service: 1/25/2022  Admitting physician: Jourdan Blevins DO   Primary Care Physician: Caryle Alderman, MD  Consultant physician: Debra Lester MD     Reason for the consultation:  Uncontrolled DM,labile BG    History of Present Illness: The history is provided by the patient. Accuracy of the patient data is excellent    [address] Maldonado is a very pleasant 36 y.o. old female with PMH of Type I DM, ESRD on PD,HTN,hypothyroidism, infective endocarditis and other listed below admitted to [facility] on 1/14/2022 because of N/V, abdominal pain and found to be in DKA. Endocrine service was consulted for DM management. Subjective   Seen and examined this AM, had one episode of hypoglycemia (52) this morning. Morning dose of Lantus held due to hypoglycemia. Has not required any ISS.      Point of care glucose monitoring (Independently reviewed)   Recent Labs     01/23/22  1955 01/23/22  2255 01/24/22  0542 01/24/22  1125 01/24/22  1701 01/24/22  2049 01/25/22  0633 01/25/22  0650   GLUMET 89 102* 74 96 169* 240* 52* 161*       Scheduled Meds:   insulin glargine-yfgn  1 Units SubCUTAneous BID    lidocaine  5 mL IntraDERmal Once    sodium chloride flush  5-40 mL IntraVENous 2 times per day    heparin flush  1 mL IntraVENous 2 times per day    vitamin D  50,000 Units Oral Q14 Days    insulin lispro  0-4 Units SubCUTAneous 4x Daily AC & HS    epoetin nena-epbx  2,000 Units SubCUTAneous Once per day on Mon Wed Fri    And    epoetin nena-epbx  3,000 Units SubCUTAneous Once per day on Mon Wed Fri    metoclopramide  5 mg Oral 4x Daily AC & HS    cholestyramine  1 packet Oral BID    pregabalin  75 mg Oral Daily    ARIPiprazole  5 mg Oral Nightly    levothyroxine  75 mcg Oral Daily    mirtazapine  15 mg Oral Nightly    pantoprazole  40 mg Oral BID AC    rOPINIRole  0.25 mg Oral Nightly    heparin (porcine)  5,000 Units SubCUTAneous Q8H    influenza virus vaccine  0.5 mL IntraMUSCular Prior to discharge     PRN Meds:   acetaminophen, 1,000 mg, Q6H PRN  sodium chloride, , PRN  sodium chloride flush, 5-40 mL, PRN  sodium chloride, 25 mL, PRN  heparin flush, 1 mL, PRN  glucose, 15 g, PRN  dextrose, 12.5 g, PRN  glucagon (rDNA), 1 mg, PRN  dextrose, 100 mL/hr, PRN  dextrose, 12.5 g, PRN  polyethylene glycol, 17 g, Daily PRN      Continuous Infusions:   sodium chloride      sodium chloride      dextrose Stopped (01/23/22 0915)       Review of Systems  All systems reviewed. All negative except for symptoms mentioned in HPI     OBJECTIVE    BP (!) 155/99   Pulse 104   Temp 97.1 °F (36.2 °C) (Temporal)   Resp 16   Ht 5' 4\" (1.626 m)   Wt 149 lb 14.4 oz (68 kg)   SpO2 97%   BMI 25.73 kg/m²     Intake/Output Summary (Last 24 hours) at 1/25/2022 1041  Last data filed at 1/25/2022 0105  Gross per 24 hour   Intake 490 ml   Output --   Net 490 ml       Physical examination:   General: awake alert, oriented x3, no abnormal position or movements. HEENT: normocephalic non-traumatic, no exophthalmos   Neck: supple  Pulm: Clear equal air entry no added sounds, no wheezing or rhonchi    Chest: Vascular access Rt. Chest. Lt chest scar. CVS: S1 + S2, no murmur  Abd: soft lax, nontender. Normal bowel sounds  Skin: warm, no lesions, no rash.     Neuro: CN intact, muscle power normal  Psych: normal mood, and affect    Review of Laboratory Data:  I personally reviewed the following labs:   Recent Labs     01/22/22  1635 01/24/22  1705 01/25/22  0510   WBC 6.0 4.3* 4.3*   RBC 2.88* 2.72* 3.14*   HGB 7.4* 6.9* 8.4*   HCT 23.1* 21.6* 25.5*   MCV 80.2 79.4* 81.2   MCH 25.7* 25.4* 26.8   MCHC 32.0 31.9* 32.9   RDW 20.1* 20.6* 19.3*   PLT 97* 78* 82*   MPV 11.0 NOT CALC NOT CALC     Recent Labs     01/22/22  1454 01/22/22  1635 01/23/22  0600 01/24/22  1249 01/25/22  0510      < > 126* 136 136   K 5.6*   < > 5.3* 4.1 3.8      < > 98 101 103   CO2 21*   < > 20* 28 25 BUN 12   < > 14 8 11   CREATININE 2.8*   < > 3.1* 2.0* 2.6*   GLUCOSE 81   < > 132* 100* 40*   CALCIUM 7.8*   < > 7.5* 7.6* 7.8*   PROT 5.4*  --   --   --   --    LABALBU 2.5*  --   --   --   --    BILITOT 0.3  --   --   --   --    ALKPHOS 177*  --   --   --   --    AST 18  --   --   --   --    ALT 9  --   --   --   --     < > = values in this interval not displayed. Beta-Hydroxybutyrate   Date Value Ref Range Status   01/14/2022 >4.50 (H) 0.02 - 0.27 mmol/L Final   12/31/2021 >4.50 (H) 0.02 - 0.27 mmol/L Final   11/22/2021 0.53 (H) 0.02 - 0.27 mmol/L Final     Lab Results   Component Value Date    LABA1C 8.7 12/08/2021    LABA1C 10.2 11/23/2021    LABA1C 10.6 09/28/2021     Lab Results   Component Value Date/Time    TSH 4.710 (H) 01/23/2022 06:00 AM    T4FREE 1.16 11/24/2021 08:52 AM    B0UCSEK 8.6 11/05/2015 02:20 AM    FT3 1.3 (L) 10/31/2020 10:43 AM    R0JUSJR 36.73 (L) 07/20/2020 02:00 PM     Lab Results   Component Value Date    LABA1C 8.7 12/08/2021    GLUCOSE 40 01/25/2022    GLUCOSE 130 05/18/2012    MALBCR 2949.3 01/15/2020    LABMICR 1740.1 01/15/2020    LABCREA 59 01/15/2020    LABCREA 61 01/15/2020     Lab Results   Component Value Date    TRIG 82 01/14/2020    HDL 33 01/14/2020    LDLCALC 46 01/14/2020    CHOL 95 01/14/2020       Blood culture   Lab Results   Component Value Date    BC 5 Days no growth 01/01/2022    BC 5 Days no growth 11/22/2021       Radiology:  CT HEAD WO CONTRAST   Final Result   No acute intracranial abnormality. XR CHEST PORTABLE   Final Result   Right lower lobe atelectasis versus infiltrate. XR CHEST PORTABLE   Final Result   Suspect early bibasilar infiltrates.              Medical Records/Labs/Images review:   I personally reviewed and summarized previous records   All labs and imaging were reviewed independently     Mary Link Erica, a 36 y.o.-old female seen today for inpatient diabetes management     Diabetes Mellitus type I · Extremely insulin sensitive. Pt with DM type 1, need long acting insulin all the time to prevent DKA   · We recommend the following diabetes regimen    · Lantus 1 unit daily in AM (starting tomorrow)   · modified Low dose sliding scale 1:80>180 with meals and at night    · Continue glucose check with meals and at bedtime   · Will titrate insulin dose based on the blood glucose trend & insulin requirement  · We will arrange for the patient to follow with us after discharge    Primary Hypothyroidism  · On levothyroxine 75 mcg daily. She was missing doses   · To recheck TFT 6-8 weeks after discharge     N/V/ Diarrhea  · Resolved  · Symptomatic Management per primary and GI services    Acute on Chronic Anemia  · S/p 1U PRBC transfusion    ESRD  · Pt at risk for hypoglycemia  · On dialysis, nephrology following    The above issues were reviewed with the patient who understood and agreed with the plan. Thank you for allowing us to participate in the care of this patient. Please do not hesitate to contact us with any additional questions. Omid Montoya MD  Endocrinologist, [facility]   [address] [place] [address]   Phone: [phone]  Fax: [phone]  ----------------------------  An electronic signature was used to authenticate this note.  Alfonzo Rincon MD on 1/25/2022 at 10:41 AM

## 2022-01-25 NOTE — PROGRESS NOTES
vomiting    Wound of left leg, sequela    Syncope    Type 1 diabetes mellitus without complication (HCC)    Hyperosmolality    Major depressive disorder    Lactic acid acidosis    Early satiety    Acute on chronic systolic CHF (congestive heart failure) (HCC)    Atypical chest pain    Chronic renal failure syndrome    Septic shock (HCC)    [facility]    Hyperosmolar hyperglycemic state (HHS) ([facility]    Hypertensive urgency    Nausea    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) ([facility]    ESRD (end stage renal disease) on dialysis ([facility]    AMS (altered mental status)    Opioid overdose ([facility]    Anemia    Type 1 diabetes mellitus with chronic kidney disease on chronic dialysis ([facility])    Elevated TSH    Diabetic acidosis without coma ([facility]    DKA, type 1, not at goal [facility])    COVID-19 virus infection    Frequent hospital admissions    Diabetic gastroparesis (HCC)        Medications:  Reviewed    Infusion Medications    sodium chloride      dextrose Stopped (01/23/22 0915)     Scheduled Medications    insulin glargine-yfgn  1 Units SubCUTAneous BID    lidocaine  5 mL IntraDERmal Once    sodium chloride flush  5-40 mL IntraVENous 2 times per day    heparin flush  1 mL IntraVENous 2 times per day    vitamin D  50,000 Units Oral Q14 Days    insulin lispro  0-4 Units SubCUTAneous 4x Daily AC & HS    epoetin nena-epbx  2,000 Units SubCUTAneous Once per day on Mon Wed Fri    And    epoetin nena-epbx  3,000 Units SubCUTAneous Once per day on Mon Wed Fri    metoclopramide  5 mg Oral 4x Daily AC & HS    cholestyramine  1 packet Oral BID    pregabalin  75 mg Oral Daily    ARIPiprazole  5 mg Oral Nightly    levothyroxine  75 mcg Oral Daily    mirtazapine  15 mg Oral Nightly    pantoprazole  40 mg Oral BID AC    rOPINIRole  0.25 mg Oral Nightly    heparin (porcine)  5,000 Units SubCUTAneous Q8H    influenza virus vaccine  0.5 mL IntraMUSCular Prior to discharge     PRN Meds: acetaminophen, sodium chloride flush, sodium chloride, heparin flush, glucose, dextrose, glucagon (rDNA), dextrose, dextrose, polyethylene glycol    I/O    Intake/Output Summary (Last 24 hours) at 1/25/2022 1309  Last data filed at 1/25/2022 0105  Gross per 24 hour   Intake 370 ml   Output --   Net 370 ml       Labs:   Recent Labs     01/22/22  1635 01/24/22  1705 01/25/22  0510   WBC 6.0 4.3* 4.3*   HGB 7.4* 6.9* 8.4*   HCT 23.1* 21.6* 25.5*   PLT 97* 78* 82*       Recent Labs     01/22/22  1454 01/22/22  1454 01/22/22  1635 01/22/22  1635 01/23/22  0600 01/24/22  1249 01/25/22  0510      < > 132   < > 126* 136 136   K 5.6*   < > 4.4   < > 5.3* 4.1 3.8      < > 100   < > 98 101 103   CO2 21*   < > 25   < > 20* 28 25   BUN 12   < > 13   < > 14 8 11   CREATININE 2.8*   < > 2.9*   < > 3.1* 2.0* 2.6*   CALCIUM 7.8*   < > 7.4*   < > 7.5* 7.6* 7.8*   PHOS 2.3*  --  2.1*  --  2.4*  --   --     < > = values in this interval not displayed. Recent Labs     01/22/22  1454   PROT 5.4*   ALKPHOS 177*   ALT 9   AST 18   BILITOT 0.3       No results for input(s): INR in the last 72 hours. No results for input(s): Shaheen Grates in the last 72 hours. Chronic labs:  Lab Results   Component Value Date    CHOL 95 01/14/2020    TRIG 82 01/14/2020    HDL 33 01/14/2020    LDLCALC 46 01/14/2020    TSH 4.710 (H) 01/23/2022    INR 1.1 12/08/2021    LABA1C 8.7 (H) 12/08/2021       Radiology:  Imaging studies reviewed today. Subjective:     Wilton Flores still c/o pain all over and weakness.     Objective:     Physical Exam:   BP (!) 161/103   Pulse 101   Temp 96.8 °F (36 °C) (Temporal)   Resp 16   Ht 5' 4\" (1.626 m)   Wt 149 lb 14.4 oz (68 kg)   SpO2 97%   BMI 25.73 kg/m²     General appearance:in no distress, flat in bed sleeping and seemed to be comfortable   Lungs: Clear to auscultation bilaterally, no wheezing or crackles   Heart: Regular rate and rhythm, S1, S2 normal   Abdomen: Soft, non-tender and not-distended. Bowel sounds normal.   Extremities: no edema   Neurologic: Grossly normal and non focal, CN II-XII intact.  Very sleepy       Geraline Jaymie, MD   Hospitalist Service   01/25/22 1:09 PM

## 2022-01-25 NOTE — PLAN OF CARE
Problem: Falls - Risk of:  Goal: Will remain free from falls  Description: Will remain free from falls  1/25/2022 1157 by Rc Chou RN  Outcome: Met This Shift     Problem: Falls - Risk of:  Goal: Absence of physical injury  Description: Absence of physical injury  1/25/2022 1157 by Rc Chou RN  Outcome: Met This Shift     Problem: Skin Integrity:  Goal: Will show no infection signs and symptoms  Description: Will show no infection signs and symptoms  1/25/2022 1157 by Rc Chou RN  Outcome: Met This Shift     Problem: Skin Integrity:  Goal: Absence of new skin breakdown  Description: Absence of new skin breakdown  1/25/2022 1157 by Rc Chou RN  Outcome: Met This Shift

## 2022-01-25 NOTE — PROGRESS NOTES
CLINICAL PHARMACY NOTE: MEDS TO BEDS    Total # of Prescriptions Filled: 0   The following medications were delivered to the patient:      Additional Documentation:    Glucose tablets no stock

## 2022-01-25 NOTE — PROGRESS NOTES
Physical Therapy  Physical Therapy Initial Assessment     Name: Maurice Dean  : 1990  MRN: [id number]      Date of Service: 2022    Evaluating PT:  Shivam Teixeira PT, DPT [id number]    Room #:  6589/9722-V  Diagnosis:  DKA, type 1, not at goal [facility]) [E10.10]  Type 1 diabetes mellitus with ketoacidosis without coma ([facility] [address] [E10.10]  Nausea and vomiting, intractability of vomiting not specified, unspecified vomiting type [R11.2]  PMHx/PSHx:  Acute CHF, BC, cardiac arrest, cephalgia, CKD, depression, DM, DKA, drug use complicating pregnancy in 3rd trimester, endocarditis, ESRD, HD patient, HLD, hypothyroidism, MDRO, MRSA, noncompliance, seizure  Procedure/Surgery:  None this admission  Precautions:  Falls  Equipment Needs:  TBD pending patient progress    SUBJECTIVE:    Pt lives with mother in a 2 story home with 3 stairs to enter and 2 rail. Bed is on 1st floor and bath is on 1st floor. Pt completed mobility with transport chair PTA performing stand pivot to chair. OBJECTIVE:   Initial Evaluation  Date: 22 Treatment Short Term/ Long Term   Goals   AM-PAC 6 Clicks 34/27     Was pt agreeable to Eval/treatment? yes     Does pt have pain? C/o pain, does not rate or place     Bed Mobility  Rolling: Jensen  Supine to sit: Jensen  Sit to supine: Jensen  Scooting: Jensen  Rolling: Independent   Supine to sit: Independent   Sit to supine: Independent   Scooting: Independent    Transfers Sit to stand: [address] [place] progressing to Jensen  Stand to sit: Jensen  Stand pivot: Jensen with [facility] [place]  Sit to stand: Independent   Stand to sit: Independent   Stand pivot: Mod I with [facility] [place]   Ambulation    4 feet forward and backward with [facility] [place] Jensen  50 feet with [facility] [place] Mod I   Stair negotiation: ascended and descended  NT  3 steps with 2 rail mod I   ROM BUE:  Defer to OT note  BLE:  WFL     Strength BUE:  Defer to OT note  BLE:  3/5  WFL   Balance Sitting EOB:  SBA  Dynamic Standing:  Jensen with [facility] [place]  Sitting EOB:  Independent   Dynamic Standing:   Mod I with [employer]     Pt is A & O x 3  Sensation:  Pt denies numbness and tingling to extremities  Edema:  None noted    Vitals:  BP seated at EOB: 104/82    Patient education  Pt educated on role of PT, safety during mobility, proper technique with [employer]    Patient response to education:   Pt verbalized understanding Pt demonstrated skill Pt requires further education in this area   yes yes yes     ASSESSMENT:    Conditions Requiring Skilled Therapeutic Intervention:    [x]Decreased strength     [x]Decreased ROM  [x]Decreased functional mobility  [x]Decreased balance   [x]Decreased endurance   [x]Decreased posture  []Decreased sensation  []Decreased coordination   []Decreased vision  []Decreased safety awareness   [x]Increased pain       Comments:  RN cleared patient for mobility prior to session. Patient in L sidelying in bed upon entry and agreeable to PT evaluation. Patient required extra time to complete all mobility this date. Patient with flat affect and lethargy throughout session, but able to participate in session. STS completed with significant lift assist required to achieve upright stance with VCs for proper  Hand placement on [employer]. Patient able to perform hygiene activity in standing for approximately 2 minutes before requiring seated rest. significant lightheadedness reported with BP reading as above. After seated rest and mild improvement in lightheadedness, patient able to stand and perform short ambulation forward and backward at EOB with [employer] and steadying assist for safety. Assisted patient back to semi-supine in bed at end of session with all needs met. RN aware of patient performance. Treatment:  Patient practiced and was instructed in the following treatment:     Bed Mobility: VCs provided for sequencing and safety during mobility. Manual assist provided for completion of task.  Transfer Training: Verbal and tactile cueing provided for sequencing and safety during mobility.  Manual assist provided for completion of task   Gait Training: Ambulation with [employer] and verbal cues for proper technique and safety. Manual assist provided for completion of task     Pt's/ family goals   1. None stated    Prognosis is good for reaching above PT goals. Patient and or family understand(s) diagnosis, prognosis, and plan of care. yes    PHYSICAL THERAPY PLAN OF CARE:    PT POC is established based on physician order and patient diagnosis     Referring provider/PT Order:    01/24/22 1400  PT eval and treat  Start:  01/24/22 1400,   End:  01/24/22 1400,   ONE TIME,   Standing Count:  1 Occurrences,   Tessa Pulido MD       Diagnosis:  DKA, type 1, not at goal [facility]) [E10.10]  Type 1 diabetes mellitus with ketoacidosis without coma ([facility] [address] [E10.10]  Nausea and vomiting, intractability of vomiting not specified, unspecified vomiting type [R11.2]  Specific instructions for next treatment:  Progress mobility as tolerated    Current Treatment Recommendations:     [x] Strengthening to improve independence with functional mobility   [x] ROM to improve independence with functional mobility   [x] Balance Training to improve static/dynamic balance and to reduce fall risk  [x] Endurance Training to improve activity tolerance during functional mobility   [x] Transfer Training to improve safety and independence with all functional transfers   [x] Gait Training to improve gait mechanics, endurance and assess need for appropriate assistive device  [x] Stair Training in preparation for safe discharge home and/or into the community   [x] Positioning to prevent skin breakdown and contractures  [x] Safety and Education Training   [x] Patient/Caregiver Education   [x] HEP  [x] Other     PT long term treatment goals are located in above grid    Frequency of treatments: 2-5x/week x 1-2 weeks.     Time in  1447  Time out  1515    Total Treatment Time  10 minutes     Evaluation Time includes thorough review of current medical information, gathering information on past medical history/social history and prior level of function, completion of standardized testing/informal observation of tasks, assessment of data and education on plan of care and goals.     CPT codes:  [x] Low Complexity PT evaluation 98955  [] Moderate Complexity PT evaluation 05380  [] High Complexity PT evaluation 74414  [] PT Re-evaluation 30069  [] Gait training 47638 - minutes  [] Manual therapy 00967 - minutes  [x] Therapeutic activities 15582 10 minutes  [] Therapeutic exercises 13009 - minutes  [] Neuromuscular reeducation 30543 - minutes     Demetrius Hubbard PT, DPT  [id number]

## 2022-01-26 PROBLEM — R53.81 DECLINING FUNCTIONAL STATUS: Status: ACTIVE | Noted: 2022-01-26

## 2022-01-26 PROBLEM — Z91.89 LACK OF MOTIVATION: Status: ACTIVE | Noted: 2022-01-26

## 2022-01-26 PROBLEM — Z76.5 DRUG-SEEKING BEHAVIOR: Status: ACTIVE | Noted: 2022-01-26

## 2022-01-26 PROBLEM — R53.81 PHYSICAL DECONDITIONING: Status: ACTIVE | Noted: 2022-01-26

## 2022-01-26 PROBLEM — Z78.9 POOR PROGNOSIS: Status: ACTIVE | Noted: 2022-01-26

## 2022-01-26 LAB
ANION GAP SERPL CALCULATED.3IONS-SCNC: 4 MMOL/L (ref 7–16)
BASOPHILS ABSOLUTE: 0.05 E9/L (ref 0–0.2)
BASOPHILS RELATIVE PERCENT: 1 % (ref 0–2)
BUN BLDV-MCNC: 6 MG/DL (ref 6–20)
CALCIUM SERPL-MCNC: 7.8 MG/DL (ref 8.6–10.2)
CHLORIDE BLD-SCNC: 106 MMOL/L (ref 98–107)
CO2: 30 MMOL/L (ref 22–29)
CREAT SERPL-MCNC: 1.7 MG/DL (ref 0.5–1)
EOSINOPHILS ABSOLUTE: 0.03 E9/L (ref 0.05–0.5)
EOSINOPHILS RELATIVE PERCENT: 0.6 % (ref 0–6)
GFR AFRICAN AMERICAN: 43
GFR NON-AFRICAN AMERICAN: 43 ML/MIN/1.73
GLUCOSE BLD-MCNC: 87 MG/DL (ref 74–99)
HCT VFR BLD CALC: 25.6 % (ref 34–48)
HEMOGLOBIN: 8.4 G/DL (ref 11.5–15.5)
IMMATURE GRANULOCYTES #: 0.02 E9/L
IMMATURE GRANULOCYTES %: 0.4 % (ref 0–5)
LACTIC ACID: 1.7 MMOL/L (ref 0.5–2.2)
LYMPHOCYTES ABSOLUTE: 1.21 E9/L (ref 1.5–4)
LYMPHOCYTES RELATIVE PERCENT: 24.9 % (ref 20–42)
MCH RBC QN AUTO: 26.6 PG (ref 26–35)
MCHC RBC AUTO-ENTMCNC: 32.8 % (ref 32–34.5)
MCV RBC AUTO: 81 FL (ref 80–99.9)
METER GLUCOSE: 135 MG/DL (ref 74–99)
METER GLUCOSE: 194 MG/DL (ref 74–99)
METER GLUCOSE: 247 MG/DL (ref 74–99)
METER GLUCOSE: 78 MG/DL (ref 74–99)
METER GLUCOSE: 85 MG/DL (ref 74–99)
METER GLUCOSE: 86 MG/DL (ref 74–99)
METER GLUCOSE: 98 MG/DL (ref 74–99)
MONOCYTES ABSOLUTE: 0.22 E9/L (ref 0.1–0.95)
MONOCYTES RELATIVE PERCENT: 4.5 % (ref 2–12)
NEUTROPHILS ABSOLUTE: 3.32 E9/L (ref 1.8–7.3)
NEUTROPHILS RELATIVE PERCENT: 68.6 % (ref 43–80)
PDW BLD-RTO: 19.5 FL (ref 11.5–15)
PLATELET # BLD: 76 E9/L (ref 130–450)
PLATELET CONFIRMATION: NORMAL
PMV BLD AUTO: ABNORMAL FL (ref 7–12)
POTASSIUM SERPL-SCNC: 3.9 MMOL/L (ref 3.5–5)
PROCALCITONIN: 5.75 NG/ML (ref 0–0.08)
RBC # BLD: 3.16 E12/L (ref 3.5–5.5)
SODIUM BLD-SCNC: 140 MMOL/L (ref 132–146)
WBC # BLD: 4.9 E9/L (ref 4.5–11.5)

## 2022-01-26 PROCEDURE — 6360000002 HC RX W HCPCS: Performed by: INTERNAL MEDICINE

## 2022-01-26 PROCEDURE — 6370000000 HC RX 637 (ALT 250 FOR IP): Performed by: FAMILY MEDICINE

## 2022-01-26 PROCEDURE — 36415 COLL VENOUS BLD VENIPUNCTURE: CPT

## 2022-01-26 PROCEDURE — 99232 SBSQ HOSP IP/OBS MODERATE 35: CPT | Performed by: INTERNAL MEDICINE

## 2022-01-26 PROCEDURE — 80048 BASIC METABOLIC PNL TOTAL CA: CPT

## 2022-01-26 PROCEDURE — 6360000002 HC RX W HCPCS

## 2022-01-26 PROCEDURE — 2060000000 HC ICU INTERMEDIATE R&B

## 2022-01-26 PROCEDURE — 6370000000 HC RX 637 (ALT 250 FOR IP): Performed by: INTERNAL MEDICINE

## 2022-01-26 PROCEDURE — 2500000003 HC RX 250 WO HCPCS: Performed by: FAMILY MEDICINE

## 2022-01-26 PROCEDURE — 83605 ASSAY OF LACTIC ACID: CPT

## 2022-01-26 PROCEDURE — 90935 HEMODIALYSIS ONE EVALUATION: CPT | Performed by: INTERNAL MEDICINE

## 2022-01-26 PROCEDURE — 84145 PROCALCITONIN (PCT): CPT

## 2022-01-26 PROCEDURE — 2580000003 HC RX 258: Performed by: INTERNAL MEDICINE

## 2022-01-26 PROCEDURE — 97165 OT EVAL LOW COMPLEX 30 MIN: CPT

## 2022-01-26 PROCEDURE — 85025 COMPLETE CBC W/AUTO DIFF WBC: CPT

## 2022-01-26 PROCEDURE — 6360000002 HC RX W HCPCS: Performed by: FAMILY MEDICINE

## 2022-01-26 PROCEDURE — 82962 GLUCOSE BLOOD TEST: CPT

## 2022-01-26 PROCEDURE — 6370000000 HC RX 637 (ALT 250 FOR IP): Performed by: STUDENT IN AN ORGANIZED HEALTH CARE EDUCATION/TRAINING PROGRAM

## 2022-01-26 RX ORDER — INSULIN GLARGINE 100 [IU]/ML
1 INJECTION, SOLUTION SUBCUTANEOUS EVERY MORNING
Qty: 10 ML | Refills: 3 | Status: ON HOLD
Start: 2022-01-26 | End: 2022-03-04 | Stop reason: HOSPADM

## 2022-01-26 RX ORDER — LABETALOL HYDROCHLORIDE 5 MG/ML
10 INJECTION, SOLUTION INTRAVENOUS EVERY 4 HOURS PRN
Status: DISCONTINUED | OUTPATIENT
Start: 2022-01-26 | End: 2022-01-28 | Stop reason: HOSPADM

## 2022-01-26 RX ORDER — HYDROXYZINE PAMOATE 25 MG/1
25 CAPSULE ORAL 3 TIMES DAILY PRN
Status: DISCONTINUED | OUTPATIENT
Start: 2022-01-26 | End: 2022-01-28 | Stop reason: HOSPADM

## 2022-01-26 RX ADMIN — HYDROXYZINE PAMOATE 25 MG: 25 CAPSULE ORAL at 14:11

## 2022-01-26 RX ADMIN — HYDROXYZINE PAMOATE 25 MG: 25 CAPSULE ORAL at 22:32

## 2022-01-26 RX ADMIN — HEPARIN 100 UNITS: 100 SYRINGE at 10:57

## 2022-01-26 RX ADMIN — SODIUM CHLORIDE, PRESERVATIVE FREE 10 ML: 5 INJECTION INTRAVENOUS at 10:33

## 2022-01-26 RX ADMIN — METOCLOPRAMIDE HYDROCHLORIDE 5 MG: 5 TABLET ORAL at 10:57

## 2022-01-26 RX ADMIN — HEPARIN 100 UNITS: 100 SYRINGE at 21:25

## 2022-01-26 RX ADMIN — LABETALOL HYDROCHLORIDE 10 MG: 5 INJECTION INTRAVENOUS at 10:33

## 2022-01-26 RX ADMIN — EPOETIN ALFA-EPBX 3000 UNITS: 3000 INJECTION, SOLUTION INTRAVENOUS; SUBCUTANEOUS at 10:57

## 2022-01-26 RX ADMIN — MIRTAZAPINE 15 MG: 15 TABLET, FILM COATED ORAL at 21:24

## 2022-01-26 RX ADMIN — HEPARIN SODIUM 5000 UNITS: 10000 INJECTION INTRAVENOUS; SUBCUTANEOUS at 21:35

## 2022-01-26 RX ADMIN — INSULIN LISPRO 1 UNITS: 100 INJECTION, SOLUTION INTRAVENOUS; SUBCUTANEOUS at 18:33

## 2022-01-26 RX ADMIN — CHOLESTYRAMINE 4 G: 4 POWDER, FOR SUSPENSION ORAL at 21:26

## 2022-01-26 RX ADMIN — METOCLOPRAMIDE HYDROCHLORIDE 5 MG: 5 TABLET ORAL at 18:33

## 2022-01-26 RX ADMIN — ROPINIROLE 0.25 MG: 0.25 TABLET, FILM COATED ORAL at 21:25

## 2022-01-26 RX ADMIN — PREGABALIN 75 MG: 75 CAPSULE ORAL at 10:57

## 2022-01-26 RX ADMIN — ARIPIPRAZOLE 5 MG: 5 TABLET ORAL at 21:24

## 2022-01-26 RX ADMIN — HEPARIN SODIUM 5000 UNITS: 10000 INJECTION INTRAVENOUS; SUBCUTANEOUS at 14:11

## 2022-01-26 RX ADMIN — PANTOPRAZOLE SODIUM 40 MG: 40 TABLET, DELAYED RELEASE ORAL at 18:33

## 2022-01-26 RX ADMIN — EPOETIN ALFA-EPBX 2000 UNITS: 2000 INJECTION, SOLUTION INTRAVENOUS; SUBCUTANEOUS at 10:57

## 2022-01-26 RX ADMIN — SODIUM CHLORIDE, PRESERVATIVE FREE 10 ML: 5 INJECTION INTRAVENOUS at 21:25

## 2022-01-26 RX ADMIN — METOCLOPRAMIDE HYDROCHLORIDE 5 MG: 5 TABLET ORAL at 23:13

## 2022-01-26 ASSESSMENT — PAIN SCALES - GENERAL
PAINLEVEL_OUTOF10: 0

## 2022-01-26 ASSESSMENT — PAIN SCALES - WONG BAKER: WONGBAKER_NUMERICALRESPONSE: 0

## 2022-01-26 NOTE — PROGRESS NOTES
Department of Internal Medicine  Nephrology Progress Note    Events Reviewed. SUBJECTIVE:  We are following Ms. Khadijah Buckner for ESRD on HD. Reports no complaints. Had dialysis this morning.     PHYSICAL EXAM:      Vitals:    VITALS:  BP (!) 161/120   Pulse 118   Temp 97.7 °F (36.5 °C)   Resp 16   Ht 5' 4\" (1.626 m)   Wt 142 lb 3.2 oz (64.5 kg)   SpO2 97%   BMI 24.41 kg/m²   24HR INTAKE/OUTPUT:      Intake/Output Summary (Last 24 hours) at 1/26/2022 1301  Last data filed at 1/26/2022 5611  Gross per 24 hour   Intake 300 ml   Output 1300 ml   Net -1000 ml       Access: RIJ tunneled dialysis catheter  Constitutional:  Lethargic, but awakens to voice  HEENT:  PERRL, normocephalic, atraumatic  Respiratory:  CTA bilaterally  Cardiovascular/Edema:  ST, RRR, no murmur gallop or rub  Gastrointestinal:  abd distended, c/o persistent abdominal pain  Neurologic: Alert and oriented, no focal deficit  Skin:  Warm, dry, ecchymotic areas to abdomen    Scheduled Meds:   insulin glargine-yfgn  1 Units SubCUTAneous QAM    lidocaine  5 mL IntraDERmal Once    sodium chloride flush  5-40 mL IntraVENous 2 times per day    heparin flush  1 mL IntraVENous 2 times per day    vitamin D  50,000 Units Oral Q14 Days    insulin lispro  0-4 Units SubCUTAneous 4x Daily AC & HS    epoetin nena-epbx  2,000 Units SubCUTAneous Once per day on Mon Wed Fri    And    epoetin nena-epbx  3,000 Units SubCUTAneous Once per day on Mon Wed Fri    metoclopramide  5 mg Oral 4x Daily AC & HS    cholestyramine  1 packet Oral BID    pregabalin  75 mg Oral Daily    ARIPiprazole  5 mg Oral Nightly    levothyroxine  75 mcg Oral Daily    mirtazapine  15 mg Oral Nightly    pantoprazole  40 mg Oral BID AC    rOPINIRole  0.25 mg Oral Nightly    heparin (porcine)  5,000 Units SubCUTAneous Q8H    influenza virus vaccine  0.5 mL IntraMUSCular Prior to discharge     Continuous Infusions:   sodium chloride      dextrose Stopped (01/23/22 0915) PRN Meds:.labetalol, hydrOXYzine, acetaminophen, sodium chloride flush, sodium chloride, heparin flush, glucose, dextrose, glucagon (rDNA), dextrose, dextrose, polyethylene glycol    DATA:    CBC with Differential:    Lab Results   Component Value Date    WBC 4.9 01/26/2022    RBC 3.16 01/26/2022    HGB 8.4 01/26/2022    HCT 25.6 01/26/2022    PLT 76 01/26/2022    MCV 81.0 01/26/2022    MCH 26.6 01/26/2022    MCHC 32.8 01/26/2022    RDW 19.5 01/26/2022    NRBC 0.9 08/10/2020    SEGSPCT 67 06/27/2013    METASPCT 1.7 08/10/2020    LYMPHOPCT 24.9 01/26/2022    MONOPCT 4.5 01/26/2022    MYELOPCT 0.9 01/19/2022    BASOPCT 1.0 01/26/2022    MONOSABS 0.22 01/26/2022    LYMPHSABS 1.21 01/26/2022    EOSABS 0.03 01/26/2022    BASOSABS 0.05 01/26/2022     CMP:    Lab Results   Component Value Date     01/26/2022    K 3.9 01/26/2022    K 4.2 11/24/2021     01/26/2022    CO2 30 01/26/2022    BUN 6 01/26/2022    CREATININE 1.7 01/26/2022    GFRAA 43 01/26/2022    LABGLOM 43 01/26/2022    GLUCOSE 87 01/26/2022    GLUCOSE 130 05/18/2012    PROT 5.4 01/22/2022    LABALBU 2.5 01/22/2022    LABALBU 4.1 05/18/2012    CALCIUM 7.8 01/26/2022    BILITOT 0.3 01/22/2022    ALKPHOS 177 01/22/2022    AST 18 01/22/2022    ALT 9 01/22/2022     Magnesium:    Lab Results   Component Value Date    MG 1.6 01/25/2022     Phosphorus:    Lab Results   Component Value Date    PHOS 2.4 01/23/2022     Radiology Review:                Chest Xray 1/14/22   Suspect early bibasilar infiltrates. BRIEF SUMMARY OF INITIAL CONSULT:     Briefly, Miss Daphne Nice is a 36year-old female with a PMH of ESRD on home hemodialysis 5 days/wk, (initiated September 2021, Type I DM, poorly controlled with recurrent admissions for DKA, HTN, gastroparesis, ascites, infective endocarditis, cardiac arrest, hypothyroidism and depression. She was recently admitted earlier this month after testing positive for COVID 19.  She presented to the ER on January 14th, 2022 with complaints of nausea and vomiting for one month and hyperglycemia. She states she has not been compliant with insulin therapy recently. In the ER she was found to be in DKA with blood sugar of 400 And beta hydroxy greater than 4.5. Other pertinent lab work revealed sodium 132, chloride 92, anion gap 19, BUN 13, Creatinine 4.5, lactic acid 2.3. She was started on DKA protocol and IVFs in the ER. Renal is consulted for ESRD on HD and DKA. Problems Resolved:   · DKA,  beta hydroxy greater than 4.5. S/P insulin drip. Endocrinology following   · Hypokalemia, 2/2 GI losses from vomiting and poor oral intake    IMPRESSION/RECOMMENDATIONS:       1. ESRD on home hemodialysis 4 days/wk (per patient) via RIJ tunneled dialysis catheter. To continue HD MWF while inpatient. HD today. 2. Hyponatremia, 2/2 decreased free water excretion from ESRD. 1L fluid restriction. 3. HTN, on lopressor and amlodipine   4. Hypophosphatemia, 2/2 poor intake, levels improved   5. Hypocalcemia, 2/2 vitamin D deficiency on CKD. To Replace  6. Vitamin D deficiency, continue ergocalciferol  7. 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 Anemia of CKD, iron level 40, iron saturation 77%, on alpha to 5,000 unit 3 times/wk, status post transfusion  9. Type I DM, poorly controlled with frequent readmissions for DKA  ------------------------------------------------------  10. Hx Covid 19, unvaccinated  11. Restless leg syndrome, on ropinirole  12. Depression, on Abilify  13. Hypothyroidism, on levothyroxine  14. Obtain ionized ionized  15. History of gastroparesis, on metoclopramide, GI consult following.   16. Nutrition, clear liquid diet  17.  Anemia, no evidence of GIB per bleeding scan     Plan:     · HD today (3 times a week M-W-F) , tolerated well  · Continue epoetin alpha 5,000 units 3 times/wk  · Continue ergocalciferol 50,000 units every 2 weeks  · Consult pain management  · Consult PT      Electronically signed by Emy Whiteside

## 2022-01-26 NOTE — FLOWSHEET NOTE
01/26/22 0937   Vital Signs   BP (!) 140/88   Temp 97.7 °F (36.5 °C)   Pulse 112   Resp 18   Weight 142 lb 3.2 oz (64.5 kg)   Weight Method Bed scale   Percent Weight Change -1.53   Pain Assessment   Pain Assessment 0-10   Pain Level 0   Post-Hemodialysis Assessment   Post-Treatment Procedures Blood returned;Catheter capped, clamped and heparinized x 2 ports   Machine Disinfection Process Exterior Machine Disinfection   Rinseback Volume (ml) 300 ml   Total Liters Processed (l/min) 67.2 l/min   Dialyzer Clearance Lightly streaked   Duration of Treatment (minutes) 180 minutes   Heparin amount administered during treatment (units) 0 units   Hemodialysis Intake (ml) 300 ml   Hemodialysis Output (ml) 1300 ml   NET Removed (ml) 1000 ml   Tolerated Treatment Good   Patient Response to Treatment tolerated well, profile C uf adjusted, 1L fluid removal   Bilateral Breath Sounds Diminished   Edema None   Physician Notified?  No

## 2022-01-26 NOTE — PROGRESS NOTES
Hospitalist Progress Note            Patient: Lester Melendez Age: 36 y.o.   DOA: 1/14/2022 Admit Dx / CC: DKA, type 1, not at goal [facility]) [E10.10]  Type 1 diabetes mellitus with ketoacidosis without coma ([facility] [address] [E10.10]  Nausea and vomiting, intractability of vomiting not specified, unspecified vomiting type [R11.2]   LOS:  LOS: 11 days      Assessment/ Plan:     DKA (resolved) in pt with uncontrolled DM1 and very insulin sensitive  C/w adjusted lantus insulin  Advised on small frequent meals  Advised to drink plenty of water  Cont ISS  Recent A1c 8.7     Hypokalemia, resolved     Elevated troponin in the setting of ESRD     End-stage renal disease on hemodialysis  D/w Dr Mala Echeverria    Chronic anemia  S/p PRBCs  Bleeding scan NEGATIVE     History of Cardiac arrest     Diabetic gastroparesis  Advised NO Narcotics as can worsen gastroparesis     History of Endocarditis    Physical deconditioning    Frequent hospital admission  Pt had 12 admission in last 12 months    Chronic malnutrition    Hx/o noncompliance with medications  Drug-seeking behavior    Long term prognosis guarded to poor    DVT ppx: heparin  Code status: Full code     Medically stable for discharge pend CM d/w pt and her mother regarding disposition    Plan of care discussed with: patient and bedside RN, CM    Patient Active Problem List   Diagnosis    Non compliance w medication regimen    Tobacco smoking complicating pregnancy    MTHFR mutation    Generalized abdominal pain    Diabetic ketoacidosis without coma associated with type 1 diabetes mellitus ([address]    Hypertension    Prolonged QT interval    Iron deficiency anemia    Sinus tachycardia    Bladder dysfunction    Hypokalemia    Severe protein-calorie malnutrition ([address]    Uncontrolled type 1 diabetes mellitus with hyperglycemia (HCC)    End stage renal disease ([address]    Hypothyroidism    Hyperlipidemia    Acute cystitis    Nondisplaced fracture of neck of fifth metacarpal bone, left hand, initial encounter for closed fracture    Chronic right-sided low back pain    Endocarditis    Seizure (HCC)    Hyperkalemia    Hypomagnesemia    Hypocalcemia    Intractable nausea and vomiting    Wound of left leg, sequela    Syncope    Type 1 diabetes mellitus without complication (HCC)    Hyperosmolality    Major depressive disorder    Lactic acid acidosis    Early satiety    Acute on chronic systolic CHF (congestive heart failure) (HCC)    Atypical chest pain    Chronic renal failure syndrome    Septic shock (HCC)    ESRD (end stage renal disease) (HCC)    Hyperosmolar hyperglycemic state (HHS) ([address]    Hypertensive urgency    Nausea    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) ([address]    ESRD (end stage renal disease) on dialysis ([address]    AMS (altered mental status)    Opioid overdose ([facility] [address]    Anemia    Type 1 diabetes mellitus with chronic kidney disease on chronic dialysis (HCC)    Elevated TSH    Diabetic acidosis without coma ([facility] [address]    DKA, type 1, not at goal [facility])    COVID-19 virus infection    Frequent hospital admissions    Diabetic gastroparesis (HCC)    Diffuse pain    Poor prognosis    Physical deconditioning    Declining functional status    Lack of motivation    Drug-seeking behavior        Medications:  Reviewed    Infusion Medications    sodium chloride      dextrose Stopped (01/23/22 0915)     Scheduled Medications    insulin glargine-yfgn  1 Units SubCUTAneous QAM    lidocaine  5 mL IntraDERmal Once    sodium chloride flush  5-40 mL IntraVENous 2 times per day    heparin flush  1 mL IntraVENous 2 times per day    vitamin D  50,000 Units Oral Q14 Days    insulin lispro  0-4 Units SubCUTAneous 4x Daily AC & HS    epoetin nena-epbx  2,000 Units SubCUTAneous Once per day on Mon Wed Fri    And    epoetin nena-epbx  3,000 Units SubCUTAneous Once per day on Mon Wed Fri    metoclopramide  5 mg Oral 4x Daily AC & HS    cholestyramine  1 packet Oral BID    pregabalin  75 mg Oral Daily    ARIPiprazole  5 mg Oral Nightly    levothyroxine  75 mcg Oral Daily    mirtazapine  15 mg Oral Nightly    pantoprazole  40 mg Oral BID AC    rOPINIRole  0.25 mg Oral Nightly    heparin (porcine)  5,000 Units SubCUTAneous Q8H    influenza virus vaccine  0.5 mL IntraMUSCular Prior to discharge     PRN Meds: labetalol, hydrOXYzine, acetaminophen, sodium chloride flush, sodium chloride, heparin flush, glucose, dextrose, glucagon (rDNA), dextrose, dextrose, polyethylene glycol    I/O    Intake/Output Summary (Last 24 hours) at 1/26/2022 1302  Last data filed at 1/26/2022 3223  Gross per 24 hour   Intake 300 ml   Output 1300 ml   Net -1000 ml       Labs:   Recent Labs     01/24/22  1705 01/25/22  0510 01/26/22  1045   WBC 4.3* 4.3* 4.9   HGB 6.9* 8.4* 8.4*   HCT 21.6* 25.5* 25.6*   PLT 78* 82* 76*       Recent Labs     01/24/22  1249 01/25/22  0510 01/26/22  1045    136 140   K 4.1 3.8 3.9    103 106   CO2 28 25 30*   BUN 8 11 6   CREATININE 2.0* 2.6* 1.7*   CALCIUM 7.6* 7.8* 7.8*       No results for input(s): PROT, ALB, ALKPHOS, ALT, AST, BILITOT, AMYLASE, LIPASE in the last 72 hours. No results for input(s): INR in the last 72 hours. No results for input(s): Carlin Castañeda in the last 72 hours. Chronic labs:  Lab Results   Component Value Date    CHOL 95 01/14/2020    TRIG 82 01/14/2020    HDL 33 01/14/2020    LDLCALC 46 01/14/2020    TSH 4.710 (H) 01/23/2022    INR 1.1 12/08/2021    LABA1C 8.7 (H) 12/08/2021       Radiology:  Imaging studies reviewed today.      Subjective:     Wilton Flores was sleeping comfortably and when I woke her up she started moaning and c/o pain \"all over\"    Objective:     Physical Exam:   BP (!) 161/120   Pulse 118   Temp 97.7 °F (36.5 °C)   Resp 16   Ht 5' 4\" (1.626 m)   Wt 142 lb 3.2 oz (64.5 kg)   SpO2 97%   BMI 24.41 kg/m²     General appearance:in no distress, flat in bed sleeping and seemed to be comfortable. Started moaning and crying when I woke her up   Lungs: Clear to auscultation bilaterally, no wheezing or crackles   Heart: Regular rate and rhythm, S1, S2 normal   Abdomen: Soft, non-tender and not-distended. Bowel sounds normal. No guarding or rigidity  Extremities: no edema   Neurologic: Grossly normal and non focal, CN II-XII intact.  Very sleepy       Betsy Contreras MD   Hospitalist Service   01/26/22 1:02 PM

## 2022-01-26 NOTE — PROGRESS NOTES
Perfect serve message sent to Dr. Carl Most covering for Dr. Bruce Mckee making him aware of bp 185/125 and HR of 101 with no PRNs ordered. Orders received for PRN. Transport took patient to dialysis, prn held.

## 2022-01-26 NOTE — PROGRESS NOTES
ENDOCRINOLOGY PROGRESS NOTE      Date of admission: 1/14/2022  Date of service: 1/26/2022  Admitting physician: Miri Stoll DO   Primary Care Physician: Sangeeta Santizo MD  Consultant physician: Laila Carrasco MD     Reason for the consultation:  Uncontrolled DM,labile BG    History of Present Illness: The history is provided by the patient. Accuracy of the patient data is excellent    [address] Maldonado is a very pleasant 36 y.o. old female with PMH of Type I DM, ESRD on PD,HTN,hypothyroidism, infective endocarditis and other listed below admitted to [facility] on 1/14/2022 because of N/V, abdominal pain and found to be in DKA. Endocrine service was consulted for DM management. Subjective   Seen and examined this AM, complaining of generalized body pain    Sugars mostly below goal. No hypoglycemia.  Lantus 1U held in the AM      Point of care glucose monitoring (Independently reviewed)   Recent Labs     01/25/22  0633 01/25/22  0650 01/25/22  1117 01/25/22  1653 01/25/22  2133 01/26/22  0234 01/26/22  0559 01/26/22  0748   GLUMET 52* 161* 140* 176* 309* 194* 86 78       Scheduled Meds:   insulin glargine-yfgn  1 Units SubCUTAneous QAM    lidocaine  5 mL IntraDERmal Once    sodium chloride flush  5-40 mL IntraVENous 2 times per day    heparin flush  1 mL IntraVENous 2 times per day    vitamin D  50,000 Units Oral Q14 Days    insulin lispro  0-4 Units SubCUTAneous 4x Daily AC & HS    epoetin nena-epbx  2,000 Units SubCUTAneous Once per day on Mon Wed Fri    And    epoetin nena-epbx  3,000 Units SubCUTAneous Once per day on Mon Wed Fri    metoclopramide  5 mg Oral 4x Daily AC & HS    cholestyramine  1 packet Oral BID    pregabalin  75 mg Oral Daily    ARIPiprazole  5 mg Oral Nightly    levothyroxine  75 mcg Oral Daily    mirtazapine  15 mg Oral Nightly    pantoprazole  40 mg Oral BID AC    rOPINIRole  0.25 mg Oral Nightly    heparin (porcine)  5,000 Units SubCUTAneous Q8H    influenza virus vaccine 0.5 mL IntraMUSCular Prior to discharge     PRN Meds:   labetalol, 10 mg, Q4H PRN  acetaminophen, 1,000 mg, Q6H PRN  sodium chloride flush, 5-40 mL, PRN  sodium chloride, 25 mL, PRN  heparin flush, 1 mL, PRN  glucose, 15 g, PRN  dextrose, 12.5 g, PRN  glucagon (rDNA), 1 mg, PRN  dextrose, 100 mL/hr, PRN  dextrose, 12.5 g, PRN  polyethylene glycol, 17 g, Daily PRN      Continuous Infusions:   sodium chloride      dextrose Stopped (01/23/22 0915)       Review of Systems  All systems reviewed. All negative except for symptoms mentioned in HPI     OBJECTIVE    BP (!) 140/88   Pulse 112   Temp 97.7 °F (36.5 °C)   Resp 18   Ht 5' 4\" (1.626 m)   Wt 142 lb 3.2 oz (64.5 kg)   SpO2 97%   BMI 24.41 kg/m²     Intake/Output Summary (Last 24 hours) at 1/26/2022 0948  Last data filed at 1/26/2022 8814  Gross per 24 hour   Intake 300 ml   Output 1300 ml   Net -1000 ml       Physical examination:   General: awake alert, oriented x3  HEENT: normocephalic non-traumatic, no exophthalmos   Neck: supple  Pulm: Clear equal air entry no added sounds, no wheezing or rhonchi    Chest: Vascular access Rt. Chest. Lt chest scar. CVS: S1 + S2, no murmur  Abd: soft lax, nontender. Normal bowel sounds  Skin: warm, no lesions, no rash.     Neuro: CN grossly intact  Psych: normal mood, and affect    Review of Laboratory Data:  I personally reviewed the following labs:   Recent Labs     01/24/22  1705 01/25/22  0510   WBC 4.3* 4.3*   RBC 2.72* 3.14*   HGB 6.9* 8.4*   HCT 21.6* 25.5*   MCV 79.4* 81.2   MCH 25.4* 26.8   MCHC 31.9* 32.9   RDW 20.6* 19.3*   PLT 78* 82*   MPV NOT CALC NOT CALC     Recent Labs     01/24/22  1249 01/25/22  0510    136   K 4.1 3.8    103   CO2 28 25   BUN 8 11   CREATININE 2.0* 2.6*   GLUCOSE 100* 40*   CALCIUM 7.6* 7.8*     Beta-Hydroxybutyrate   Date Value Ref Range Status   01/14/2022 >4.50 (H) 0.02 - 0.27 mmol/L Final   12/31/2021 >4.50 (H) 0.02 - 0.27 mmol/L Final   11/22/2021 0.53 (H) 0.02 - 0.27 mmol/L Final     Lab Results   Component Value Date    LABA1C 8.7 12/08/2021    LABA1C 10.2 11/23/2021    LABA1C 10.6 09/28/2021     Lab Results   Component Value Date/Time    TSH 4.710 (H) 01/23/2022 06:00 AM    T4FREE 1.16 11/24/2021 08:52 AM    A7YSHCN 8.6 11/05/2015 02:20 AM    FT3 1.3 (L) 10/31/2020 10:43 AM    N5BWBUL 36.73 (L) 07/20/2020 02:00 PM     Lab Results   Component Value Date    LABA1C 8.7 12/08/2021    GLUCOSE 40 01/25/2022    GLUCOSE 130 05/18/2012    MALBCR 2949.3 01/15/2020    LABMICR 1740.1 01/15/2020    LABCREA 59 01/15/2020    LABCREA 61 01/15/2020     Lab Results   Component Value Date    TRIG 82 01/14/2020    HDL 33 01/14/2020    LDLCALC 46 01/14/2020    CHOL 95 01/14/2020       Blood culture   Lab Results   Component Value Date    BC 5 Days no growth 01/01/2022    BC 5 Days no growth 11/22/2021       Radiology:  NM GI BLOOD LOSS   Final Result   There is no evidence for acute GI bleed            CT HEAD WO CONTRAST   Final Result   No acute intracranial abnormality. XR CHEST PORTABLE   Final Result   Right lower lobe atelectasis versus infiltrate. XR CHEST PORTABLE   Final Result   Suspect early bibasilar infiltrates. Medical Records/Labs/Images review:   I personally reviewed and summarized previous records   All labs and imaging were reviewed independently     [address] Maldonado, a 36 y.o.-old female seen today for inpatient diabetes management     Diabetes Mellitus type I    · Extremely insulin sensitive.  Pt with DM type 1, need long acting insulin all the time to prevent DKA   · We recommend the following diabetes regimen    · Lantus 1 unit daily in AM (starting tomorrow)   · modified Low dose sliding scale 1:80>180 with meals and at night    · Continue glucose check with meals and at bedtime   · Will titrate insulin dose based on the blood glucose trend & insulin requirement  · We will arrange for the patient to follow with us after discharge    Primary Hypothyroidism  · On levothyroxine 75 mcg daily. She was missing doses   · To recheck TFT 6-8 weeks after discharge     N/V/ Diarrhea  · Resolved  · Symptomatic Management per primary and GI services    ESRD  · Pt at risk for hypoglycemia  · On dialysis, nephrology following    The above issues were reviewed with the patient who understood and agreed with the plan. Thank you for allowing us to participate in the care of this patient. Please do not hesitate to contact us with any additional questions. I saw the patient and discussed the management with the resident physician Dr. Burke Roth,  I reviewed and agree with the findings and plan as documented in the resident's note    Dede Coley MD  Endocrinologist, [facility]   [address] [place] [address], [address] [place] [address],[facility] [id number]   Phone: [phone]  Fax: [phone]  ----------------------------  An electronic signature was used to authenticate this note.  Hussein Leiva MD on 1/26/2022 at 9:48 AM

## 2022-01-26 NOTE — PROGRESS NOTES
Perfect serve message sent to Dr. Chris Wyatt covering for Dr. Veronica Negrete making him aware of bp of 163/113 and HR of 108 with no PRNs ordered.      No new orders received, will continue to monitor.

## 2022-01-26 NOTE — PROGRESS NOTES
Perfect serve message sent to Dr. Fabiola Whitt covering for Dr. Juliana Lara making him aware of bp of 163/113 and HR of 108 with no PRNs ordered. No new orders received, will continue to monitor.

## 2022-01-26 NOTE — PROGRESS NOTES
OCCUPATIONAL THERAPY INITIAL EVALUATION      [address] [address] [address]       Date:2022                                                  Patient Name: Claudette Sutherland  MRN: [id number]  : 1990  Room: 85[facility]    Evaluating OT: Jose Cardwell, [place] #[id number]    Referring Provider[de-identified] Bucky Navarro MD     Specific Provider Orders/Date: OT evaluation and treatment 22    Diagnosis:  DKA, type 1, not at goal [facility]) [E10.10]  Type 1 diabetes mellitus with ketoacidosis without coma ([address] [E10.10]  Nausea and vomiting, intractability of vomiting not specified, unspecified vomiting type [R11.2]      Pertinent Medical History:  has a past medical history of Acute congestive heart failure ([address] BC (acute kidney injury) ([address] Cardiac arrest ([address] Cephalgia, Chronic kidney disease, Depression, Diabetes mellitus ([address] Diabetic gastroparesis associated with type 1 diabetes mellitus ([address] Diabetic ketoacidosis ([address] Diabetic ketoacidosis with coma associated with type 1 diabetes mellitus ([address] Diabetic polyneuropathy associated with type 1 diabetes mellitus ([address] Drug use complicating pregnancy in third trimester, Endocarditis, ESRD (end stage renal disease) ([address] H/O cardiovascular stress test, Hemodialysis patient ([address] History of blood transfusion, Hyperlipidemia, Hyperosmolar hyperglycemic state (HHS) ([address] Hypothyroidism, Iron deficiency anemia, MDRO (multiple drug resistant organisms) resistance, MRSA (methicillin resistant Staphylococcus aureus), Non compliance w medication regimen, Other disorders of kidney and ureter, Pregnancy, Previous  delivery affecting pregnancy, antepartum, Previous stillbirth or demise, antepartum, Seizure ([address] Severe pre-eclampsia in third trimester, Shock liver, and Valvular endocarditis.        Precautions:  Fall Risk, TAPS, bed alarm,     Assessment of current deficits   [x] Functional mobility   [x]ADLs  [x] Strength               []Cognition   [x] Functional transfers   [] IADLs         [x] Safety Awareness   [x]Endurance   [] Fine Coordination              [x] Balance      [] Vision/perception   []Sensation    []Gross Motor Coordination  [] ROM  [] Delirium                   [x] Motor Control     OT PLAN OF CARE   OT POC based on physician orders, patient diagnosis and results of clinical assessment    Frequency/Duration  2-5 days/wk for 2 weeks PRN   Specific OT Treatment to include:   * Instruction/training on adapted ADL techniques and AE recommendations to increase functional independence within precautions       * Functional transfer/mobility training/DME recommendations for increased independence, safety, and fall prevention  * Patient/Family education to increase follow through with safety techniques and functional independence  * Therapeutic exercise to improve motor endurance, ROM, and functional strength for ADLs/functional transfers  * Therapeutic activities to facilitate/challenge dynamic balance, stand tolerance for increased safety and independence with ADLs  * Positioning to improve skin integrity, interaction with environment and functional independence    Recommended Adaptive Equipment:  TBD     Home Living:  Pt lives with mother in a 2 story with 3 step(s) to enter and 2 rail(s); bed on 1st and 1/2bath on 1st.    Bathroom setup: tub shower in basement,   Equipment owned: transport chair,     Prior Level of Function: pt stated that she has been bedbound for past 6 months, her mother assists her with all  ADLs , and   with IADLs; Sugar Sanchez Pt reported she does not ambulate.    Driving: no  Occupation/leisure: likes to draw, cook    Pain Level: \"all over pain\" 9/10    Cognition: A&O: 4/4;   Follows 2 step directions: good    Memory:  good    Sequencing:  fair    Problem solving:  fair    Judgement/safety:  fair     Functional Assessment:   AM-PAC Daily Activity Raw Score: 15/24     Initial Eval Status  Date: 1/26/22 Treatment Status  Date: STG=LTG  Time frame: 10-14 days   Feeding Independent   Independent    Grooming Minimal Assist   Independent    UB Dressing Minimal Assist   Independent    LB Dressing Maximal Assist   Stand by Assist    Bathing Moderate Assist  Stand by Assist    Toileting Dependent /max A  Currently using bedpan  Minimal Assist    Bed Mobility  Supine to sit: Moderate Assist   Sit to supine: Moderate Assist   Supine to sit: Modified [place]   Sit to supine: Modified [place]    Functional Transfers Sit to stand: Unable with multiple attempts. Pt was able to scoot along side of bed with mod A   Stand to sit: NT   Stand pivot: NT   Stand by Assist    Functional Mobility NT  SBA with AAD   Balance Sitting: supervision     Standing: NT  Sitting: indep      Standing: SBA   Activity Tolerance Poor  Pt was alert but appeared self limiting   good   Visual/  Perceptual Glasses: yes          BUE  ROM/Strength/  Fine motor Coordination Hand dominance: R    RUE: ROM WFL     Strength: grossly 4-/5      Strength:  WFL     Coordination:   WFL    LUE: ROM  WFL     Strength: grossly 4-/5      Strength:  WFL     Coordination: WFL       Hearing: WFL   Sensation:  No c/o numbness or tingling   Tone: impaired LE   Edema:  None noted    Comments:   RN cleared patient for OT. Upon arrival, patient in bed. Pt was cooperative but appeared self limiting. She was unable to stand this session. . Patient presents with   decreased  independence with  ADLs,  bed mobility, and  functional transfers,  Therapist facilitated and instructed pt on adapted  techniques & compensatory strategies to improve safety and independence with basic ADLs, bed mobility,  functional transfers to allow pt to achieve highest level of independence and safely. At end of session, patient in bed with call light and phone within reach, all lines and tubes intact.     Pt would benefit from continued skilled OT to increase safety and independence with completion of ADL tasks and functional mobility for improved quality of life. Rehab Potential: Good  for established goals     Patient / Family Goal:  Not stated    Patient and/or family were instructed on functional diagnosis, prognosis/goals and OT plan of care. Demonstrated fair understanding. Eval Complexity: Low    Time In: 11:25  Time Out: 11:42  Total Treatment Time: 0 minutes    Min Units   OT Eval Low 46610  x     OT Eval Medium 04846      OT Eval High 63587       OT Re-Eval [id number]       Therapeutic Ex 61245       Therapeutic Activities 86512      ADL/Self Care 21590      Orthotic Management 28413       Neuro Re-Ed 73538       Non-Billable Time          Evaluation Time includes thorough review of current medical information, gathering information on past medical history/social history and prior level of function, completion of standardized testing/informal observation of tasks, assessment of data and education on plan of care and goals.             [facility] [place], [place] #[id number]

## 2022-01-27 PROBLEM — F69 BEHAVIOR PROBLEM, ADULT: Status: ACTIVE | Noted: 2022-01-27

## 2022-01-27 LAB
ANION GAP SERPL CALCULATED.3IONS-SCNC: 7 MMOL/L (ref 7–16)
BUN BLDV-MCNC: 11 MG/DL (ref 6–20)
CALCIUM SERPL-MCNC: 8.2 MG/DL (ref 8.6–10.2)
CHLORIDE BLD-SCNC: 110 MMOL/L (ref 98–107)
CO2: 26 MMOL/L (ref 22–29)
CREAT SERPL-MCNC: 3.1 MG/DL (ref 0.5–1)
GFR AFRICAN AMERICAN: 21
GFR NON-AFRICAN AMERICAN: 21 ML/MIN/1.73
GLUCOSE BLD-MCNC: 85 MG/DL (ref 74–99)
METER GLUCOSE: 129 MG/DL (ref 74–99)
METER GLUCOSE: 226 MG/DL (ref 74–99)
METER GLUCOSE: 306 MG/DL (ref 74–99)
METER GLUCOSE: 89 MG/DL (ref 74–99)
POTASSIUM SERPL-SCNC: 4.5 MMOL/L (ref 3.5–5)
SODIUM BLD-SCNC: 143 MMOL/L (ref 132–146)

## 2022-01-27 PROCEDURE — 80074 ACUTE HEPATITIS PANEL: CPT

## 2022-01-27 PROCEDURE — 2500000003 HC RX 250 WO HCPCS: Performed by: FAMILY MEDICINE

## 2022-01-27 PROCEDURE — 6360000002 HC RX W HCPCS: Performed by: FAMILY MEDICINE

## 2022-01-27 PROCEDURE — 80048 BASIC METABOLIC PNL TOTAL CA: CPT

## 2022-01-27 PROCEDURE — 6370000000 HC RX 637 (ALT 250 FOR IP): Performed by: INTERNAL MEDICINE

## 2022-01-27 PROCEDURE — 36415 COLL VENOUS BLD VENIPUNCTURE: CPT

## 2022-01-27 PROCEDURE — 99232 SBSQ HOSP IP/OBS MODERATE 35: CPT | Performed by: INTERNAL MEDICINE

## 2022-01-27 PROCEDURE — S5553 INSULIN LONG ACTING 5 U: HCPCS | Performed by: INTERNAL MEDICINE

## 2022-01-27 PROCEDURE — 99253 IP/OBS CNSLTJ NEW/EST LOW 45: CPT | Performed by: PHYSICIAN ASSISTANT

## 2022-01-27 PROCEDURE — 97530 THERAPEUTIC ACTIVITIES: CPT

## 2022-01-27 PROCEDURE — 2060000000 HC ICU INTERMEDIATE R&B

## 2022-01-27 PROCEDURE — 6370000000 HC RX 637 (ALT 250 FOR IP): Performed by: STUDENT IN AN ORGANIZED HEALTH CARE EDUCATION/TRAINING PROGRAM

## 2022-01-27 PROCEDURE — 6370000000 HC RX 637 (ALT 250 FOR IP): Performed by: FAMILY MEDICINE

## 2022-01-27 PROCEDURE — 6360000002 HC RX W HCPCS: Performed by: INTERNAL MEDICINE

## 2022-01-27 PROCEDURE — 2580000003 HC RX 258: Performed by: INTERNAL MEDICINE

## 2022-01-27 PROCEDURE — 82962 GLUCOSE BLOOD TEST: CPT

## 2022-01-27 RX ORDER — HYDROXYZINE HYDROCHLORIDE 10 MG/1
25 TABLET, FILM COATED ORAL ONCE
Status: COMPLETED | OUTPATIENT
Start: 2022-01-27 | End: 2022-01-27

## 2022-01-27 RX ORDER — PREGABALIN 50 MG/1
50 CAPSULE ORAL 2 TIMES DAILY
Status: DISCONTINUED | OUTPATIENT
Start: 2022-01-27 | End: 2022-01-28 | Stop reason: HOSPADM

## 2022-01-27 RX ADMIN — INSULIN GLARGINE-YFGN 1 UNITS: 100 INJECTION, SOLUTION SUBCUTANEOUS at 08:52

## 2022-01-27 RX ADMIN — METOCLOPRAMIDE HYDROCHLORIDE 5 MG: 5 TABLET ORAL at 17:15

## 2022-01-27 RX ADMIN — PREGABALIN 50 MG: 50 CAPSULE ORAL at 21:54

## 2022-01-27 RX ADMIN — ARIPIPRAZOLE 5 MG: 5 TABLET ORAL at 21:54

## 2022-01-27 RX ADMIN — INSULIN LISPRO 1 UNITS: 100 INJECTION, SOLUTION INTRAVENOUS; SUBCUTANEOUS at 21:57

## 2022-01-27 RX ADMIN — CHOLESTYRAMINE 4 G: 4 POWDER, FOR SUSPENSION ORAL at 08:48

## 2022-01-27 RX ADMIN — CHOLESTYRAMINE 4 G: 4 POWDER, FOR SUSPENSION ORAL at 21:54

## 2022-01-27 RX ADMIN — SODIUM CHLORIDE, PRESERVATIVE FREE 10 ML: 5 INJECTION INTRAVENOUS at 21:55

## 2022-01-27 RX ADMIN — MIRTAZAPINE 15 MG: 15 TABLET, FILM COATED ORAL at 21:54

## 2022-01-27 RX ADMIN — DILTIAZEM HYDROCHLORIDE 90 MG: 30 TABLET, FILM COATED ORAL at 18:27

## 2022-01-27 RX ADMIN — HEPARIN SODIUM 5000 UNITS: 10000 INJECTION INTRAVENOUS; SUBCUTANEOUS at 06:11

## 2022-01-27 RX ADMIN — LEVOTHYROXINE SODIUM 75 MCG: 0.03 TABLET ORAL at 05:57

## 2022-01-27 RX ADMIN — PREGABALIN 75 MG: 75 CAPSULE ORAL at 08:48

## 2022-01-27 RX ADMIN — HEPARIN 100 UNITS: 100 SYRINGE at 08:48

## 2022-01-27 RX ADMIN — PANTOPRAZOLE SODIUM 40 MG: 40 TABLET, DELAYED RELEASE ORAL at 05:57

## 2022-01-27 RX ADMIN — HYDROXYZINE PAMOATE 25 MG: 25 CAPSULE ORAL at 08:49

## 2022-01-27 RX ADMIN — HEPARIN SODIUM 5000 UNITS: 10000 INJECTION INTRAVENOUS; SUBCUTANEOUS at 21:57

## 2022-01-27 RX ADMIN — HEPARIN 100 UNITS: 100 SYRINGE at 21:54

## 2022-01-27 RX ADMIN — LABETALOL HYDROCHLORIDE 10 MG: 5 INJECTION INTRAVENOUS at 17:15

## 2022-01-27 RX ADMIN — HYDROXYZINE HYDROCHLORIDE 25 MG: 10 TABLET, FILM COATED ORAL at 12:17

## 2022-01-27 RX ADMIN — ROPINIROLE 0.25 MG: 0.25 TABLET, FILM COATED ORAL at 21:54

## 2022-01-27 RX ADMIN — METOCLOPRAMIDE HYDROCHLORIDE 5 MG: 5 TABLET ORAL at 21:54

## 2022-01-27 RX ADMIN — METOCLOPRAMIDE HYDROCHLORIDE 5 MG: 5 TABLET ORAL at 12:10

## 2022-01-27 RX ADMIN — ACETAMINOPHEN 1000 MG: 500 TABLET ORAL at 12:10

## 2022-01-27 RX ADMIN — INSULIN LISPRO 2 UNITS: 100 INJECTION, SOLUTION INTRAVENOUS; SUBCUTANEOUS at 06:11

## 2022-01-27 RX ADMIN — PANTOPRAZOLE SODIUM 40 MG: 40 TABLET, DELAYED RELEASE ORAL at 17:15

## 2022-01-27 RX ADMIN — HEPARIN SODIUM 5000 UNITS: 10000 INJECTION INTRAVENOUS; SUBCUTANEOUS at 14:38

## 2022-01-27 RX ADMIN — DILTIAZEM HYDROCHLORIDE 60 MG: 30 TABLET, FILM COATED ORAL at 12:09

## 2022-01-27 RX ADMIN — METOCLOPRAMIDE HYDROCHLORIDE 5 MG: 5 TABLET ORAL at 05:57

## 2022-01-27 ASSESSMENT — PAIN SCALES - GENERAL
PAINLEVEL_OUTOF10: 8
PAINLEVEL_OUTOF10: 7

## 2022-01-27 NOTE — PROGRESS NOTES
Comprehensive Nutrition Assessment    Type and Reason for Visit:  Reassess    Nutrition Recommendations/Plan: Liberalize diet as able per pt request, Modify ONS to Boost Pudding TID  Will order specific mid meal snacks as kitchen has not been providing them per current order    Nutrition Assessment:  Pt remains nutritionally at risk s/p admit w/ DKA. Hx CHF, DM, ESRD on HD, seizures, substance abuse, gastroparesis. Noted ongoing diarrhea w/ restrictive diet, pt understandably requesting diet liberalization. Will modify ONS per preference and monitor    Malnutrition Assessment:  Malnutrition Status: At risk for malnutrition (Comment)    Context:  Acute Illness     Findings of the 6 clinical characteristics of malnutrition:  Energy Intake:  Mild decrease in energy intake (Comment)  Weight Loss:  Unable to assess (d/t fluid shifts on HD)     Body Fat Loss:  No significant body fat loss     Muscle Mass Loss:  No significant muscle mass loss    Fluid Accumulation:  No significant fluid accumulation     Strength:  Not Performed    Estimated Daily Nutrient Needs:  Energy (kcal):  ; Weight Used for Energy Requirements:  Current     Protein (g):  ; Weight Used for Protein Requirements:  Current (1.3-1.5)        Fluid (ml/day):  per renal management; Method Used for Fluid Requirements:  Standard Renal      Nutrition Related Findings:  pt alert, soft abd, active BS, persistent diarrhea, trace edema, +I/Os      Wounds:  None       Current Nutrition Therapies:    ADULT ORAL NUTRITION SUPPLEMENT; Lunch, Dinner; Other Oral Supplement; gelatein  ADULT DIET; Regular; 4 carb choices (60 gm/meal); Low Potassium (Less than 3000 mg/day); Low Fat (less than or equal to 50 gm/day);  Low Fiber; 1000 ml    Anthropometric Measures:  · Height: 5' 4\" (162.6 cm)  · Current Body Weight: 142 lb (64.4 kg) (bed scale 1/27)   · Admission Body Weight: 142 lb (64.4 kg) (actual 11/17)    · Usual Body Weight:  (135-169# measured per EMR x1 year)     · Ideal Body Weight: 120 lbs; % Ideal Body Weight 118.3 %   · BMI: 24.4  · BMI Categories: Normal Weight (BMI 18.5-24. 9)       Nutrition Diagnosis:   · Inadequate oral intake related to altered GI function (gastroparesis) as evidenced by poor intake prior to admission      Nutrition Interventions:   Food and/or Nutrient Delivery:  Modify Current Diet,Modify Oral Nutrition Supplement (liberalize diet as able, Boost Pudding TID)  Nutrition Education/Counseling:  Education initiated (reviewed snack/meal options)   Coordination of Nutrition Care:  Continue to monitor while inpatient    Goals:  Consume >50% meals/ONS       Nutrition Monitoring and Evaluation:   Behavioral-Environmental Outcomes:  None Identified   Food/Nutrient Intake Outcomes:  Diet Advancement/Tolerance,Food and Nutrient Intake,Supplement Intake  Physical Signs/Symptoms Outcomes:  Biochemical Data,GI Status,Diarrhea,Fluid Status or Edema,Skin,Weight,Nutrition Focused Physical Findings     Discharge Planning:     Too soon to determine     Electronically signed by Andrez Garcia MS, RD, LD on 1/27/22 at 11:57 AM EST    Contact: [phone]

## 2022-01-27 NOTE — PROGRESS NOTES
Physical Therapy  Treatment Note    Name: Thao Woodard  : 1990  MRN: [id number]      Date of Service: 2022    Evaluating PT:  Janeth Oneal PT, DPT [id number]    Room #:  9416/8446-G  Diagnosis:  DKA, type 1, not at goal [facility]) [E10.10]  Type 1 diabetes mellitus with ketoacidosis without coma ([facility] [E10.10]  Nausea and vomiting, intractability of vomiting not specified, unspecified vomiting type [R11.2]  PMHx/PSHx:  Acute CHF, BC, cardiac arrest, cephalgia, CKD, depression, DM, DKA, drug use complicating pregnancy in 3rd trimester, endocarditis, ESRD, HD patient, HLD, hypothyroidism, MDRO, MRSA, noncompliance, seizure  Procedure/Surgery:  None this admission  Precautions:  Falls  Equipment Needs:  TBD pending patient progress    SUBJECTIVE:    Pt lives with mother in a 2 story home with 3 stairs to enter and 2 rail. Bed is on 1st floor and bath is on 1st floor. Pt completed mobility with transport chair PTA performing stand pivot to chair. OBJECTIVE:   Initial Evaluation  Date: 22 Treatment Date: 22 Short Term/ Long Term   Goals   AM-PAC 6 Clicks     Was pt agreeable to Eval/treatment? yes Yes    Does pt have pain? C/o pain, does not rate or place 9/10 generalized pain    Bed Mobility  Rolling: [place]  Supine to sit: [place]  Sit to supine: [place]  Scooting: [place] Rolling: NT  Supine to sit: SBA  Sit to supine: SBA  Scooting: SBA Rolling: Independent   Supine to sit: Independent   Sit to supine: Independent   Scooting: Independent    Transfers Sit to stand: ModA progressing to Jensen  Stand to sit: Jensen  Stand pivot: Jensen with [employer] Sit to stand: Min A  Stand to sit: Min A  Stand pivot: Min A with HHA Sit to stand: Independent   Stand to sit: Independent   Stand pivot:  Mod I with [employer]   Ambulation    4 feet forward and backward with [employer] Jensen 30 feet with HHA with Min A 50 feet with [employer] Mod I   Stair negotiation: ascended and descended  NT NT 3 steps with 2 rail mod I   ROM BUE:  Defer to OT note  BLE:  WFL NT    Strength BUE:  Defer to OT note  BLE:  3/5 NT WFL   Balance Sitting EOB:  SBA  Dynamic Standing:  Jensen with [employer] Sitting EOB: SBA  Dynamic Standing:  Min A with HHA Sitting EOB:  Independent   Dynamic Standing: Mod I with [employer]     Pt is A & O x: 4 to person, place, month/year, and situation. Sensation: Pt denies numbness and tingling of extremities. Edema: Unremarkable. Therapeutic Exercises:   LAQ x10  Ankle pumps x10    Patient education  Pt educated on PT role in acute care setting. Patient response to education:   Pt verbalized understanding Pt demonstrated skill Pt requires further education in this area   Yes NA No     ASSESSMENT:    Comments:    Pt was in bed upon room entry, agreeable to PT treatment. Pt has flat affect but was pleasant and cooperative. Pt completed all bed mobility transfers without need for hands on assist. Pt denied dizziness with positional changes. Pt sidestepped and ambulated around room with HHA. Gait was slow but fairly steady. No LOB occurred but pt reported she felt \"shaky\". Pt ambulated back to bed and completed therapeutic exercises. Pt was assisted back to bed. Pt was left in bed with all needs met at conclusion of session. Treatment:  Patient practiced and was instructed in the following treatment:     Therapeutic exercises:  o Pt completed all therapeutic exercises noted above.  Therapeutic activities:    o Transfers: Pt completed x2 transfers from EOB. o Ambulation: Pt sidestepped and ambulated within room. PLAN:    Patient is making Fair progress towards established goals. Will continue with current POC.       Time in: 1145  Time out: 1200    Total Treatment Time 15 minutes     CPT codes:  [] Gait training 83064 0 minutes  [] Manual therapy 09874 0 minutes  [x] Therapeutic activities 24884 15 minutes  [] Therapeutic exercises 86662 0 minutes  [] Neuromuscular reeducation 26436 0 minutes      Jg Cantrell, PT, DPT   [id number]

## 2022-01-27 NOTE — PROGRESS NOTES
Hospitalist Progress Note            Patient: Lester Melendez Age: 36 y.o.   DOA: 1/14/2022 Admit Dx / CC: DKA, type 1, not at goal [facility]) [E10.10]  Type 1 diabetes mellitus with ketoacidosis without coma ([facility] [address] [E10.10]  Nausea and vomiting, intractability of vomiting not specified, unspecified vomiting type [R11.2]   LOS:  LOS: 12 days      Assessment/ Plan:     DKA (resolved) in pt with uncontrolled DM1 and very insulin sensitive  C/w adjusted lantus insulin  Advised on small frequent meals  Advised to drink plenty of water  Cont ISS  Recent A1c 8.7    Tachycardia  Start small dose CCB and titrate as needed     Hypokalemia, resolved     Elevated troponin in the setting of ESRD     End-stage renal disease on hemodialysis    Chronic anemia  S/p PRBCs  Bleeding scan NEGATIVE     History of Cardiac arrest     Diabetic gastroparesis  Advised NO Narcotics as can worsen gastroparesis     History of Endocarditis    Physical deconditioning    Frequent hospital admission  Pt had 12 admission in last 12 months    Chronic malnutrition    Hx/o noncompliance with medications  Drug-seeking behavior  Pain mx consulted    Long term prognosis guarded to poor    DVT ppx: heparin  Code status: Full code     Medically stable for discharge pend CM d/w pt and her mother regarding disposition    Plan of care discussed with: patient and bedside RN, CM    Patient Active Problem List   Diagnosis    Non compliance w medication regimen    Tobacco smoking complicating pregnancy    MTHFR mutation    Generalized abdominal pain    Diabetic ketoacidosis without coma associated with type 1 diabetes mellitus ([facility] [address]    Hypertension    Prolonged QT interval    Iron deficiency anemia    Sinus tachycardia    Bladder dysfunction    Hypokalemia    Severe protein-calorie malnutrition ([facility] [address]    Uncontrolled type 1 diabetes mellitus with hyperglycemia (HCC)    End stage renal disease ([facility] [address]    Hypothyroidism    Hyperlipidemia  Acute cystitis    Nondisplaced fracture of neck of fifth metacarpal bone, left hand, initial encounter for closed fracture    Chronic right-sided low back pain    Endocarditis    Seizure (HCC)    Hyperkalemia    Hypomagnesemia    Hypocalcemia    Intractable nausea and vomiting    Wound of left leg, sequela    Syncope    Type 1 diabetes mellitus without complication (HCC)    Hyperosmolality    Major depressive disorder    Lactic acid acidosis    Early satiety    Acute on chronic systolic CHF (congestive heart failure) (HCC)    Atypical chest pain    Chronic renal failure syndrome    Septic shock (HCC)    ESRD (end stage renal disease) (HCC)    Hyperosmolar hyperglycemic state (HHS) ([address]    Hypertensive urgency    Nausea    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) ([address]    ESRD (end stage renal disease) on dialysis ([address]    AMS (altered mental status)    Opioid overdose ([facility] [address]    Anemia    Type 1 diabetes mellitus with chronic kidney disease on chronic dialysis (HCC)    Elevated TSH    Diabetic acidosis without coma ([facility] [address]    DKA, type 1, not at goal [facility])    COVID-19 virus infection    Frequent hospital admissions    Diabetic gastroparesis (HCC)    Diffuse pain    Poor prognosis    Physical deconditioning    Declining functional status    Lack of motivation    Drug-seeking behavior    Behavior problem, adult        Medications:  Reviewed    Infusion Medications    sodium chloride      dextrose Stopped (01/23/22 0915)     Scheduled Medications    dilTIAZem  60 mg Oral 4 times per day    insulin lispro  0-3 Units SubCUTAneous 4x Daily AC & HS    insulin glargine-yfgn  1 Units SubCUTAneous QAM    lidocaine  5 mL IntraDERmal Once    sodium chloride flush  5-40 mL IntraVENous 2 times per day    heparin flush  1 mL IntraVENous 2 times per day    vitamin D  50,000 Units Oral Q14 Days    epoetin nena-epbx  2,000 Units SubCUTAneous Once per day on Mon Wed Fri    And    epoetin nena-epbx  3,000 Units SubCUTAneous Once per day on Mon Wed Fri    metoclopramide  5 mg Oral 4x Daily AC & HS    cholestyramine  1 packet Oral BID    pregabalin  75 mg Oral Daily    ARIPiprazole  5 mg Oral Nightly    levothyroxine  75 mcg Oral Daily    mirtazapine  15 mg Oral Nightly    pantoprazole  40 mg Oral BID AC    rOPINIRole  0.25 mg Oral Nightly    heparin (porcine)  5,000 Units SubCUTAneous Q8H    influenza virus vaccine  0.5 mL IntraMUSCular Prior to discharge     PRN Meds: labetalol, hydrOXYzine, acetaminophen, sodium chloride flush, sodium chloride, heparin flush, glucose, dextrose, glucagon (rDNA), dextrose, dextrose, polyethylene glycol    I/O  No intake or output data in the 24 hours ending 01/27/22 1512    Labs:   Recent Labs     01/24/22  1705 01/25/22  0510 01/26/22  1045   WBC 4.3* 4.3* 4.9   HGB 6.9* 8.4* 8.4*   HCT 21.6* 25.5* 25.6*   PLT 78* 82* 76*       Recent Labs     01/25/22  0510 01/26/22  1045 01/27/22  1117    140 143   K 3.8 3.9 4.5    106 110*   CO2 25 30* 26   BUN 11 6 11   CREATININE 2.6* 1.7* 3.1*   CALCIUM 7.8* 7.8* 8.2*       No results for input(s): PROT, ALB, ALKPHOS, ALT, AST, BILITOT, AMYLASE, LIPASE in the last 72 hours. No results for input(s): INR in the last 72 hours. No results for input(s): Ethelene Soulier in the last 72 hours. Chronic labs:  Lab Results   Component Value Date    CHOL 95 01/14/2020    TRIG 82 01/14/2020    HDL 33 01/14/2020    LDLCALC 46 01/14/2020    TSH 4.710 (H) 01/23/2022    INR 1.1 12/08/2021    LABA1C 8.7 (H) 12/08/2021       Radiology:  Imaging studies reviewed today. Subjective:     Wilton Flores c/o pain \"all over\"    Objective:     Physical Exam:   BP (!) 144/106   Pulse 121   Temp 98.3 °F (36.8 °C) (Temporal)   Resp 16   Ht 5' 4\" (1.626 m)   Wt 142 lb 6.4 oz (64.6 kg)   SpO2 100%   BMI 24.44 kg/m²     General appearance:in no distress, flat in bed sleeping and seemed to be comfortable.  Again started moaning and crying I knocked room door  Lungs: Clear to auscultation bilaterally, no wheezing or crackles   Heart: Regular rate and rhythm, S1, S2 normal   Abdomen: Soft, non-tender and not-distended. Bowel sounds normal. No guarding or rigidity  Extremities: no edema   Neurologic: Grossly normal and non focal, CN II-XII intact.        Ivan Heath MD   Hospitalist Service   01/27/22 3:12 PM

## 2022-01-27 NOTE — PROGRESS NOTES
ENDOCRINOLOGY PROGRESS NOTE      Date of admission: 1/14/2022  Date of service: 1/27/2022  Admitting physician: Kiana Brower DO   Primary Care Physician: Godwin Espinoza MD  Consultant physician: Liliana Blackburn MD     Reason for the consultation:  Uncontrolled DM, labile BG    History of Present Illness: The history is provided by the patient. Accuracy of the patient data is excellent    [address] is a very pleasant 36 y.o. old female with PMH of Type I DM, ESRD on PD,HTN,hypothyroidism, infective endocarditis and other listed below admitted to [facility] on 1/14/2022 because of N/V, abdominal pain and found to be in DKA. Endocrine service was consulted for DM management. Subjective   Seen and examined this AM, complaining of generalized body pain    Sugars mostly below goal. No hypoglycemia. Lantus held yesterday. Received 1U Humalog last night. BG this am up to 306, Lantus 1U and Humalog 2 U given this morning.        Point of care glucose monitoring (Independently reviewed)   Recent Labs     01/26/22  0234 01/26/22  0559 01/26/22  0748 01/26/22  1144 01/26/22  1251 01/26/22  1753 01/26/22  2122 01/27/22  0555   GLUMET 194* 86 78 85 98 247* 135* 306*       Scheduled Meds:   insulin glargine-yfgn  1 Units SubCUTAneous QAM    lidocaine  5 mL IntraDERmal Once    sodium chloride flush  5-40 mL IntraVENous 2 times per day    heparin flush  1 mL IntraVENous 2 times per day    vitamin D  50,000 Units Oral Q14 Days    insulin lispro  0-4 Units SubCUTAneous 4x Daily AC & HS    epoetin nena-epbx  2,000 Units SubCUTAneous Once per day on Mon Wed Fri    And    epoetin nena-epbx  3,000 Units SubCUTAneous Once per day on Mon Wed Fri    metoclopramide  5 mg Oral 4x Daily AC & HS    cholestyramine  1 packet Oral BID    pregabalin  75 mg Oral Daily    ARIPiprazole  5 mg Oral Nightly    levothyroxine  75 mcg Oral Daily    mirtazapine  15 mg Oral Nightly    pantoprazole  40 mg Oral BID AC    rOPINIRole  0.25 mg Oral Nightly    heparin (porcine)  5,000 Units SubCUTAneous Q8H    influenza virus vaccine  0.5 mL IntraMUSCular Prior to discharge     PRN Meds:   labetalol, 10 mg, Q4H PRN  hydrOXYzine, 25 mg, TID PRN  acetaminophen, 1,000 mg, Q6H PRN  sodium chloride flush, 5-40 mL, PRN  sodium chloride, 25 mL, PRN  heparin flush, 1 mL, PRN  glucose, 15 g, PRN  dextrose, 12.5 g, PRN  glucagon (rDNA), 1 mg, PRN  dextrose, 100 mL/hr, PRN  dextrose, 12.5 g, PRN  polyethylene glycol, 17 g, Daily PRN      Continuous Infusions:   sodium chloride      dextrose Stopped (01/23/22 0915)       Review of Systems  All systems reviewed. All negative except for symptoms mentioned in HPI     OBJECTIVE    BP (!) 154/93   Pulse 121   Temp 97.7 °F (36.5 °C) (Temporal)   Resp 16   Ht 5' 4\" (1.626 m)   Wt 142 lb 3.2 oz (64.5 kg)   SpO2 100%   BMI 24.41 kg/m²   No intake or output data in the 24 hours ending 01/27/22 6411    Physical examination:   General: awake alert, oriented x3  HEENT: normocephalic non-traumatic, no exophthalmos   Neck: supple  Pulm: Clear equal air entry no added sounds, no wheezing or rhonchi    Chest: Vascular access Rt. Chest. Lt chest scar. CVS: S1 + S2, no murmur  Abd: soft lax, nontender. Normal bowel sounds  Skin: warm, no lesions, no rash.     Neuro: CN grossly intact  Psych: normal mood, and affect    Review of Laboratory Data:  I personally reviewed the following labs:   Recent Labs     01/24/22  1705 01/25/22  0510 01/26/22  1045   WBC 4.3* 4.3* 4.9   RBC 2.72* 3.14* 3.16*   HGB 6.9* 8.4* 8.4*   HCT 21.6* 25.5* 25.6*   MCV 79.4* 81.2 81.0   MCH 25.4* 26.8 26.6   MCHC 31.9* 32.9 32.8   RDW 20.6* 19.3* 19.5*   PLT 78* 82* 76*   MPV NOT CALC NOT CALC NOT CALC     Recent Labs     01/24/22  1249 01/25/22  0510 01/26/22  1045    136 140   K 4.1 3.8 3.9    103 106   CO2 28 25 30*   BUN 8 11 6   CREATININE 2.0* 2.6* 1.7*   GLUCOSE 100* 40* 87   CALCIUM 7.6* 7.8* 7.8*     Beta-Hydroxybutyrate   Date Value Ref Range Status   01/14/2022 >4.50 (H) 0.02 - 0.27 mmol/L Final   12/31/2021 >4.50 (H) 0.02 - 0.27 mmol/L Final   11/22/2021 0.53 (H) 0.02 - 0.27 mmol/L Final     Lab Results   Component Value Date    LABA1C 8.7 12/08/2021    LABA1C 10.2 11/23/2021    LABA1C 10.6 09/28/2021     Lab Results   Component Value Date/Time    TSH 4.710 (H) 01/23/2022 06:00 AM    T4FREE 1.16 11/24/2021 08:52 AM    B2KZSWZ 8.6 11/05/2015 02:20 AM    FT3 1.3 (L) 10/31/2020 10:43 AM    G2JAUQX 36.73 (L) 07/20/2020 02:00 PM     Lab Results   Component Value Date    LABA1C 8.7 12/08/2021    GLUCOSE 87 01/26/2022    GLUCOSE 130 05/18/2012    MALBCR 2949.3 01/15/2020    LABMICR 1740.1 01/15/2020    LABCREA 59 01/15/2020    LABCREA 61 01/15/2020     Lab Results   Component Value Date    TRIG 82 01/14/2020    HDL 33 01/14/2020    LDLCALC 46 01/14/2020    CHOL 95 01/14/2020       Blood culture   Lab Results   Component Value Date    BC 5 Days no growth 01/01/2022    BC 5 Days no growth 11/22/2021       Radiology:  NM GI BLOOD LOSS   Final Result   There is no evidence for acute GI bleed            CT HEAD WO CONTRAST   Final Result   No acute intracranial abnormality. XR CHEST PORTABLE   Final Result   Right lower lobe atelectasis versus infiltrate. XR CHEST PORTABLE   Final Result   Suspect early bibasilar infiltrates. Medical Records/Labs/Images review:   I personally reviewed and summarized previous records   All labs and imaging were reviewed independently     [address], a 36 y.o.-old female seen today for inpatient diabetes management     Diabetes Mellitus type I    · Extremely insulin sensitive.  Pt with DM type 1, need long acting insulin all the time to prevent DKA   · We recommend the following diabetes regimen    · Lantus 1 unit daily in AM   · Switch modified Low dose sliding scale to 1:100>200 with meals and at night    · Continue glucose check with meals and at bedtime · Will titrate insulin dose based on the blood glucose trend & insulin requirement  · We will arrange for the patient to follow with us after discharge    Primary Hypothyroidism  · On levothyroxine 75 mcg daily. She was missing doses   · To recheck TFT 6-8 weeks after discharge     N/V/ Diarrhea  · Resolved  · Symptomatic Management per primary and GI services    ESRD  · Pt at risk for hypoglycemia  · On dialysis, nephrology following    The above issues were reviewed with the patient who understood and agreed with the plan. Thank you for allowing us to participate in the care of this patient. Please do not hesitate to contact us with any additional questions. Marilee Barbour MD  Endocrinologist, [facility]   [address] [place], [place] [id number]   Phone: [phone]  Fax: [phone]  ----------------------------  An electronic signature was used to authenticate this note.  Vivian Celestin MD on 1/27/2022 at 9:37 AM

## 2022-01-27 NOTE — PROGRESS NOTES
Department of Internal Medicine  Nephrology Progress Note    Events Reviewed. For dialysis tomorrow    SUBJECTIVE:  We are following Ms. Khadijah Buckner for ESRD on HD. Reports being abdominal pain.     PHYSICAL EXAM:      Vitals:    VITALS:  BP (!) 144/106   Pulse 121   Temp 98.3 °F (36.8 °C) (Temporal)   Resp 16   Ht 5' 4\" (1.626 m)   Wt 142 lb 6.4 oz (64.6 kg)   SpO2 100%   BMI 24.44 kg/m²   24HR INTAKE/OUTPUT:    No intake or output data in the 24 hours ending 01/27/22 1404    Access: RIJ tunneled dialysis catheter  Constitutional:  Lethargic, but awakens to voice  HEENT:  PERRL, normocephalic, atraumatic  Respiratory:  CTA bilaterally  Cardiovascular/Edema:  ST, RRR, no murmur gallop or rub  Gastrointestinal:  abd distended, c/o persistent abdominal pain  Neurologic: Alert and oriented, no focal deficit  Skin:  Warm, dry, ecchymotic areas to abdomen    Scheduled Meds:   dilTIAZem  60 mg Oral 4 times per day    insulin lispro  0-3 Units SubCUTAneous 4x Daily AC & HS    insulin glargine-yfgn  1 Units SubCUTAneous QAM    lidocaine  5 mL IntraDERmal Once    sodium chloride flush  5-40 mL IntraVENous 2 times per day    heparin flush  1 mL IntraVENous 2 times per day    vitamin D  50,000 Units Oral Q14 Days    epoetin nena-epbx  2,000 Units SubCUTAneous Once per day on Mon Wed Fri    And    epoetin nena-epbx  3,000 Units SubCUTAneous Once per day on Mon Wed Fri    metoclopramide  5 mg Oral 4x Daily AC & HS    cholestyramine  1 packet Oral BID    pregabalin  75 mg Oral Daily    ARIPiprazole  5 mg Oral Nightly    levothyroxine  75 mcg Oral Daily    mirtazapine  15 mg Oral Nightly    pantoprazole  40 mg Oral BID AC    rOPINIRole  0.25 mg Oral Nightly    heparin (porcine)  5,000 Units SubCUTAneous Q8H    influenza virus vaccine  0.5 mL IntraMUSCular Prior to discharge     Continuous Infusions:   sodium chloride      dextrose Stopped (01/23/22 0915)     PRN Meds:.labetalol, hydrOXYzine, acetaminophen, sodium chloride flush, sodium chloride, heparin flush, glucose, dextrose, glucagon (rDNA), dextrose, dextrose, polyethylene glycol    DATA:    CBC with Differential:    Lab Results   Component Value Date    WBC 4.9 01/26/2022    RBC 3.16 01/26/2022    HGB 8.4 01/26/2022    HCT 25.6 01/26/2022    PLT 76 01/26/2022    MCV 81.0 01/26/2022    MCH 26.6 01/26/2022    MCHC 32.8 01/26/2022    RDW 19.5 01/26/2022    NRBC 0.9 08/10/2020    SEGSPCT 67 06/27/2013    METASPCT 1.7 08/10/2020    LYMPHOPCT 24.9 01/26/2022    MONOPCT 4.5 01/26/2022    MYELOPCT 0.9 01/19/2022    BASOPCT 1.0 01/26/2022    MONOSABS 0.22 01/26/2022    LYMPHSABS 1.21 01/26/2022    EOSABS 0.03 01/26/2022    BASOSABS 0.05 01/26/2022     CMP:    Lab Results   Component Value Date     01/27/2022    K 4.5 01/27/2022    K 4.2 11/24/2021     01/27/2022    CO2 26 01/27/2022    BUN 11 01/27/2022    CREATININE 3.1 01/27/2022    GFRAA 21 01/27/2022    LABGLOM 21 01/27/2022    GLUCOSE 85 01/27/2022    GLUCOSE 130 05/18/2012    PROT 5.4 01/22/2022    LABALBU 2.5 01/22/2022    LABALBU 4.1 05/18/2012    CALCIUM 8.2 01/27/2022    BILITOT 0.3 01/22/2022    ALKPHOS 177 01/22/2022    AST 18 01/22/2022    ALT 9 01/22/2022     Magnesium:    Lab Results   Component Value Date    MG 1.6 01/25/2022     Phosphorus:    Lab Results   Component Value Date    PHOS 2.4 01/23/2022     Radiology Review:                Chest Xray 1/14/22   Suspect early bibasilar infiltrates. BRIEF SUMMARY OF INITIAL CONSULT:     Briefly, Miss Dino Bobby is a 36year-old female with a PMH of ESRD on home hemodialysis 5 days/wk, (initiated September 2021, Type I DM, poorly controlled with recurrent admissions for DKA, HTN, gastroparesis, ascites, infective endocarditis, cardiac arrest, hypothyroidism and depression. She was recently admitted earlier this month after testing positive for COVID 19.  She presented to the ER on January 14th, 2022 with complaints of nausea and vomiting for one month and hyperglycemia. She states she has not been compliant with insulin therapy recently. In the ER she was found to be in DKA with blood sugar of 400 And beta hydroxy greater than 4.5. Other pertinent lab work revealed sodium 132, chloride 92, anion gap 19, BUN 13, Creatinine 4.5, lactic acid 2.3. She was started on DKA protocol and IVFs in the ER. Renal is consulted for ESRD on HD and DKA. Problems Resolved:   · DKA,  beta hydroxy greater than 4.5. S/P insulin drip. Endocrinology following   · Hypokalemia, 2/2 GI losses from vomiting and poor oral intake    IMPRESSION/RECOMMENDATIONS:       1. ESRD on home hemodialysis 4 days/wk (per patient) via RIJ tunneled dialysis catheter. To continue HD MWF while inpatient. HD today. 2. Hyponatremia, 2/2 decreased free water excretion from ESRD. 1L fluid restriction. 3. HTN, on lopressor and amlodipine   4. Hypophosphatemia, 2/2 poor intake, levels improved   5. Hypocalcemia, 2/2 vitamin D deficiency on CKD. To Replace  6. Vitamin D deficiency, continue ergocalciferol  7. 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 Anemia of CKD, iron level 40, iron saturation 77%, on alpha to 5,000 unit 3 times/wk, status post transfusion  9. Type I DM, poorly controlled with frequent readmissions for DKA  ------------------------------------------------------  10. Hx Covid 19, unvaccinated  11. Restless leg syndrome, on ropinirole  12. Depression, on Abilify  13. Hypothyroidism, on levothyroxine  14. Obtain ionized ionized  15. History of gastroparesis, on metoclopramide, GI consult following.   16. Nutrition, clear liquid diet  17.  Anemia, no evidence of GIB per bleeding scan     Plan:     · HD 3 times a week M-W-F  · Continue epoetin alpha 5,000 units 3 times/wk  · Continue ergocalciferol 50,000 units every 2 weeks  · Obtain ionized calcium  · Plan for discharge possible subacute rehab      Electronically signed by HEBER Conway CNP on 1/27/2022 at 2:04 PM  Await pain management consult  Discussed with RN  Agree with discharge plans  Aysha West MD

## 2022-01-27 NOTE — CONSULTS
[facility]) Pain Management  IP CONSULT NOTE       Patient: Hussain Steve  : 1990  Date of Admission: 2022  8:49 PM  Date of Service: 2022  Reason for Consult: All over body pain        HISTORY OF PRESENT ILLNESS:  This is a 36year old female who was admitted on 2022 for nausea/vomiting and elevated blood sugar. She describes her pain as \"all over body pain. \"  States that she feels [place]. \"  Denies that her pain is neuropathic in nature, however, does state that she has neuropathy that is not as bothersome. States that her pain is chronic in nature and cannot quantify how long it has been an issue but states quite some time. Reports that her pain is a \"9\" while in the hospital and admits that it is worse as it is all that she has to focus on. She reports that her pain is a \"7\" on a regular day at home. Reports pain medication helps when receiving it. Reports lyrica and gabapentin not very helpful. She states that her pain is somewhat worse when out of bed. Requesting assistance with pain control while she is inpatient.   Chief Complaint   Patient presents with    Emesis       Past Medical History:       Diagnosis Date    Acute congestive heart failure ([address]     BC (acute kidney injury) ([address] 10/01/2019    Cardiac arrest ([address] 02/15/2021    Cephalgia 10/09/2019    Chronic kidney disease     Depression     Diabetes mellitus ([address]     Diabetic gastroparesis associated with type 1 diabetes mellitus ([address] 2018    Diabetic ketoacidosis ([address] 2011    Diabetic ketoacidosis with coma associated with type 1 diabetes mellitus ([address] 2013    Diabetic polyneuropathy associated with type 1 diabetes mellitus ([address] 2020    Drug use complicating pregnancy in third trimester     Endocarditis 10/31/2020    ESRD (end stage renal disease) ([address] 2020    H/O cardiovascular stress test 2021    Lexiscan    Hemodialysis patient [facility])     History of blood transfusion 2019    Hyperlipidemia 10/08/2020    Hyperosmolar hyperglycemic state (HHS) ([facility] 2020    Hypothyroidism 10/08/2020    Iron deficiency anemia 10/01/2019    MDRO (multiple drug resistant organisms) resistance     MRSA (methicillin resistant Staphylococcus aureus)     back wound abcess    Non compliance w medication regimen 2016    Other disorders of kidney and ureter     Pregnancy 2016    16 weeks    Previous  delivery affecting pregnancy, antepartum 2017    Previous stillbirth or demise, antepartum 2016    Seizure ([facility] 2020    Severe pre-eclampsia in third trimester 2016    Shock liver 02/15/2021    Valvular endocarditis 11/10/2020    This Diagnosis was added to the Problem List based on transcribed orders from Dr. Dagoberto Najjar           Past Surgical History:       Procedure Laterality Date    BACK SURGERY      abscess    CATHETER REMOVAL N/A 10/1/2021    PERITONEAL CATHETER REMOVAL performed by Matthew Ladd MD at [facility]      x2   [facility], LAPAROSCOPIC N/A 2019    CHOLECYSTECTOMY LAPAROSCOPIC performed by Ajay Cadet MD at [facility] N/A 2018    COLONOSCOPY WITH BIOPSY performed by Sofi Almodovar MD at [facility] N/A 2018    COLONOSCOPY WITH BIOPSY performed by Flower Sandra MD at [id number] [place] [address] ECHO COMPL [address]  2012    EF 57%    ECHO COMPL W DOP COLOR FLOW  6/10/2013         EMBOLECTOMY N/A 2020    ANGIOVAC, VEGECTOMY, YULISSA -- REQS ROOM 3 [facility] 2021    LEFT LEG INCISION AND DRAINAGE, DEBRIDEMENT, WOUND VAC APPLICATION performed by Kate Vega DPM at [facility] Left 2021    LEFT LEG DEBRIDEMENT BIOPSY POSS APPLICATION WOUND VAC performed by Costa Sanchez DPM at [place] [address]. 6/26/2020    LAPAROSCOPIC INSERTION PERITONEAL DIALYSIS CATHETER performed by Selina Esquivel MD at [facility] ESOPHAGOGASTRODUODENOSCOPY TRANSORAL DIAGNOSTIC N/A 5/30/2018    EGD ESOPHAGOGASTRODUODENOSCOPY performed by José Miguel Rojas MD at [id number] [place] [address] TRANSESOPHAGEAL ECHOCARDIOGRAM N/A 10/19/2020    TRANSESOPHAGEAL ECHOCARDIOGRAM WITH BUBBLE STUDY performed by Melly Mcnair MD at [address] [place] [address]  04/2018    UPPER GASTROINTESTINAL ENDOSCOPY  12/18/2018    EGD BIOPSY performed by Marian Patterson MD at [address] 10/11/2019    EGD ESOPHAGOGASTRODUODENOSCOPY performed by Dixie Salter DO at [address] [place] [address] Avenue N/A 7/14/2021    EGD BIOPSY performed by Lu Snider MD at [facility]:  Allergies   Allergen Reactions    Cefepime Other (See Comments)     \" neurological side effect- slurred speech and confusion    Toradol [Ketorolac Tromethamine] Anaphylaxis and Hives     LABS:   Recent Results (from the past 72 hour(s))   POCT Glucose    Collection Time: 01/24/22  5:01 PM   Result Value Ref Range    Meter Glucose 169 (H) 74 - 99 mg/dL   CBC WITH AUTO DIFFERENTIAL    Collection Time: 01/24/22  5:05 PM   Result Value Ref Range    WBC 4.3 (L) 4.5 - 11.5 E9/L    RBC 2.72 (L) 3.50 - 5.50 E12/L    Hemoglobin 6.9 (LL) 11.5 - 15.5 g/dL    Hematocrit 21.6 (L) 34.0 - 48.0 %    MCV 79.4 (L) 80.0 - 99.9 fL    MCH 25.4 (L) 26.0 - 35.0 pg    MCHC 31.9 (L) 32.0 - 34.5 %    RDW 20.6 (H) 11.5 - 15.0 fL    Platelets 78 (L) 800 - 450 E9/L    MPV NOT CALC 7.0 - 12.0 fL    Neutrophils % 73.3 43.0 - 80.0 %    Immature Granulocytes % 0.5 0.0 - 5.0 %    Lymphocytes % 19.6 (L) 20.0 - 42.0 %    Monocytes % 5.4 2.0 - 12.0 %    Eosinophils % 0.7 0.0 - 6.0 %    Basophils % 0.5 0.0 - 2.0 %    Neutrophils Absolute 3.14 1.80 - 7.30 E9/L    Immature Granulocytes # 0.02 E9/L    Lymphocytes Absolute 0.84 2.0 %    Neutrophils Absolute 3.10 1.80 - 7.30 E9/L    Lymphocytes Absolute 1.08 (L) 1.50 - 4.00 E9/L    Monocytes Absolute 0.09 (L) 0.10 - 0.95 E9/L    Eosinophils Absolute 0.04 (L) 0.05 - 0.50 E9/L    Basophils Absolute 0.00 0.00 - 0.20 E9/L    Anisocytosis 2+     Polychromasia 2+     Hypochromia 2+     Poikilocytes 2+     Ovalocytes 1+     Target Cells 2+    MAGNESIUM    Collection Time: 01/25/22  5:10 AM   Result Value Ref Range    Magnesium 1.6 1.6 - 2.6 mg/dL   Platelet Confirmation    Collection Time: 01/25/22  5:10 AM   Result Value Ref Range    Platelet Confirmation CONFIRMED    POCT Glucose    Collection Time: 01/25/22  6:33 AM   Result Value Ref Range    Meter Glucose 52 (L) 74 - 99 mg/dL   POCT Glucose    Collection Time: 01/25/22  6:50 AM   Result Value Ref Range    Meter Glucose 161 (H) 74 - 99 mg/dL   POCT Glucose    Collection Time: 01/25/22 11:17 AM   Result Value Ref Range    Meter Glucose 140 (H) 74 - 99 mg/dL   POCT Glucose    Collection Time: 01/25/22  4:53 PM   Result Value Ref Range    Meter Glucose 176 (H) 74 - 99 mg/dL   POCT Glucose    Collection Time: 01/25/22  9:33 PM   Result Value Ref Range    Meter Glucose 309 (H) 74 - 99 mg/dL   POCT Glucose    Collection Time: 01/26/22  2:34 AM   Result Value Ref Range    Meter Glucose 194 (H) 74 - 99 mg/dL   POCT Glucose    Collection Time: 01/26/22  5:59 AM   Result Value Ref Range    Meter Glucose 86 74 - 99 mg/dL   POCT Glucose    Collection Time: 01/26/22  7:48 AM   Result Value Ref Range    Meter Glucose 78 74 - 99 mg/dL   Procalcitonin    Collection Time: 01/26/22 10:45 AM   Result Value Ref Range    Procalcitonin 5.75 (H) 0.00 - 0.08 ng/mL   Lactic acid, plasma    Collection Time: 01/26/22 10:45 AM   Result Value Ref Range    Lactic Acid 1.7 0.5 - 2.2 mmol/L   CBC Auto Differential    Collection Time: 01/26/22 10:45 AM   Result Value Ref Range    WBC 4.9 4.5 - 11.5 E9/L    RBC 3.16 (L) 3.50 - 5.50 E12/L    Hemoglobin 8.4 (L) 11.5 - 15.5 g/dL 146 mmol/L    Potassium 4.5 3.5 - 5.0 mmol/L    Chloride 110 (H) 98 - 107 mmol/L    CO2 26 22 - 29 mmol/L    Anion Gap 7 7 - 16 mmol/L    Glucose 85 74 - 99 mg/dL    BUN 11 6 - 20 mg/dL    CREATININE 3.1 (H) 0.5 - 1.0 mg/dL    GFR Non-African American 21 >=60 mL/min/1.73    GFR African American 21     Calcium 8.2 (L) 8.6 - 10.2 mg/dL   POCT Glucose    Collection Time: 01/27/22 11:17 AM   Result Value Ref Range    Meter Glucose 89 74 - 99 mg/dL       IMAGING: CT HEAD WO CONTRAST    Result Date: 1/22/2022  No acute intracranial abnormality. NM GI BLOOD LOSS    Result Date: 1/25/2022  There is no evidence for acute GI bleed     XR CHEST PORTABLE    Result Date: 1/22/2022  Right lower lobe atelectasis versus infiltrate. PHYSICAL EXAMINATION:   General appearance: in no distress with movement in bed, alert and oriented x3 pleasant and cooperative   Build: obese   Function: Laying in bed  HEENT:   Head: normocephalic and atraumatic   Pupils: regular, round and equal.   Sclera: icterus absent  Lungs:   No respiratory distress. Symmetrical expansion.    Extremities:   Tremors: No  Intact:Yes   Varicose veins: absent   Cyanosis:none   Edema: None   Neurological:   Gait: unable to assess   Dermatology:   Skin:no unusual rashes, no skin lesions, no palpable subcutaneous nodules and good skin turgor    Inpatient Medication regimen:   Current Facility-Administered Medications   Medication Dose Route Frequency Provider Last Rate Last Admin    dilTIAZem (CARDIZEM) tablet 60 mg  60 mg Oral 4 times per day Tara Galindo MD   60 mg at 01/27/22 1209    insulin lispro (HUMALOG) injection vial 0-3 Units  0-3 Units SubCUTAneous 4x Daily AC & HS Zeke Chaparro MD        labetalol (NORMODYNE;TRANDATE) injection 10 mg  10 mg IntraVENous Q4H PRN Jonatan Hunt DO   10 mg at 01/26/22 1033    hydrOXYzine (VISTARIL) capsule 25 mg  25 mg Oral TID PRN Tara Galindo MD   25 mg at 01/27/22 0849    insulin glargine-yfgn [facility]) injection vial 1 Units  1 Units SubCUTAneous QAM Mitzy Bocanegra MD   1 Units at 01/27/22 0570    acetaminophen (TYLENOL) tablet 1,000 mg  1,000 mg Oral Q6H PRN Barry Roman MD   1,000 mg at 01/27/22 1210    lidocaine 1 % injection 5 mL  5 mL IntraDERmal Once [place] MD        sodium chloride flush 0.9 % injection 5-40 mL  5-40 mL IntraVENous 2 times per day Y[place] MD   10 mL at 01/26/22 2125    sodium chloride flush 0.9 % injection 5-40 mL  5-40 mL IntraVENous PRN Joe Worley MD        0.9 % sodium chloride infusion  25 mL IntraVENous PRN Joe Worley MD        heparin flush 100 UNIT/ML injection 100 Units  1 mL IntraVENous 2 times per day Y[place] MD   100 Units at 01/27/22 0848    heparin flush 100 UNIT/ML injection 100 Units  1 mL IntraCATHeter PRN [place] MD   100 Units at 01/24/22 1321    vitamin D (ERGOCALCIFEROL) capsule 50,000 Units  50,000 Units Oral Q14 Days Joe Worley MD        epoetin nena-epbx (RETACRIT) injection 2,000 Units  2,000 Units SubCUTAneous Once per day on Mon Wed Fri HEBER Gavin CNP   2,000 Units at 01/26/22 1057    And    epoetin nena-epbx (RETACRIT) injection 3,000 Units  3,000 Units SubCUTAneous Once per day on Mon Wed Fri HEBER Gavin CNP   3,000 Units at 01/26/22 1057    metoclopramide (REGLAN) tablet 5 mg  5 mg Oral 4x Daily AC & HS Lexie Martines MD   5 mg at 01/27/22 1210    cholestyramine (QUESTRAN) packet 4 g  1 packet Oral BID Tish Ivey MD   4 g at 01/27/22 0848    glucose (GLUTOSE) 40 % oral gel 15 g  15 g Oral PRN Hendricks Gaucher, MD   30 g at 01/21/22 1727    dextrose 50 % IV solution  12.5 g IntraVENous PRN Hendricks Gaucher, MD   12.5 g at 01/25/22 0639    glucagon (rDNA) injection 1 mg  1 mg IntraMUSCular PRN Hendricks Gaucher, MD   1 mg at 01/22/22 1528    dextrose 5 % solution  100 mL/hr IntraVENous PRN Hendricks Gaucher, MD   Stopped at 01/23/22 0915    pregabalin (LYRICA) capsule 75 mg  75 mg Oral Daily Raza Park MD   75 mg at 01/27/22 0848    ARIPiprazole (ABILIFY) tablet 5 mg  5 mg Oral Nightly Dory Debar, DO   5 mg at 01/26/22 2124    levothyroxine (SYNTHROID) tablet 75 mcg  75 mcg Oral Daily Dory Debar, DO   75 mcg at 01/27/22 0557    mirtazapine (REMERON) tablet 15 mg  15 mg Oral Nightly Dory Debar, DO   15 mg at 01/26/22 2124    pantoprazole (PROTONIX) tablet 40 mg  40 mg Oral BID AC Dory Debar, DO   40 mg at 01/27/22 0557    rOPINIRole (REQUIP) tablet 0.25 mg  0.25 mg Oral Nightly Dory Debar, DO   0.25 mg at 01/26/22 2125    dextrose 50 % IV solution  12.5 g IntraVENous PRN Dory Debar, DO   12.5 g at 01/23/22 0201    polyethylene glycol (GLYCOLAX) packet 17 g  17 g Oral Daily PRN Dory Debar, DO        heparin (porcine) injection 5,000 Units  5,000 Units SubCUTAneous Q8H Dory Debar, DO   5,000 Units at 01/27/22 4918    influenza quadrivalent split vaccine (FLUZONE;FLUARIX;FLULAVAL;AFLURIA) injection 0.5 mL  0.5 mL IntraMUSCular Prior to discharge Dory Debar, DO           Impression:   All over body pain  Admitted for nausea/vomiting/DKA  Hx of type 1 DM on HD  Hx of cardiac arrest 2021  Hx of seizure  Recent COVID infection    Current inpatient pain regimen:  1. Tylenol 1000 mg Q 6 hours prn  2. Lyrica 75 mg QD  No narcotics as it can worsen gastroparesis per notes. Recommendation for plan of care:   1. Consider changing lyrica to 50 mg BID. Reports gabapentin  2. No narcotics as above  3.   Consider rheumatologic referral as an outpatient for all over body pain    This case was discussed with Dr. Dani Medina MD        Thank you for the referral.   MADDIE Machado [facility] and [facility] Pain Management

## 2022-01-27 NOTE — PROGRESS NOTES
Patient refusing turns and repositioning. Patient educated on importance of turning and shifting weight to maintain skin integrity.  Patient verbalizes understanding and states she has been alternating sides while she is in bed

## 2022-01-27 NOTE — CARE COORDINATION
Return call received from Reza with [facility]. They are unable to accept the patient at this time. Return call received from viri Salasison with [facility] [place]. They do not have a bed today at [place], but they may later in the week. Call placed to Elisabeth jc, viriison with [facility] with referral information. They do not have a chair available at their facility but they can still follow for transition of care planning with an outpatient chair time. Return call also received from ugo [place] with [facility]. She is waiting to see is [facility] will accept the patient for transition of care planning, however she does have room at [facility] for transition of care as well if the patient and family would be agreeable. Await determination from [facility] and will speak with the patient's mother about options for transition of care. Patient will need outpatient chair time for transition of care arranged as she has been doing home HD treatments. [facility]. Angel Batista, liaison with [facility] notified of referral as well. Will continue to follow.      Nilam Baird RN.  [facility]  [phone]

## 2022-01-28 VITALS
RESPIRATION RATE: 18 BRPM | OXYGEN SATURATION: 100 % | SYSTOLIC BLOOD PRESSURE: 142 MMHG | WEIGHT: 141.09 LBS | BODY MASS INDEX: 24.09 KG/M2 | HEART RATE: 103 BPM | TEMPERATURE: 97.5 F | DIASTOLIC BLOOD PRESSURE: 107 MMHG | HEIGHT: 64 IN

## 2022-01-28 LAB
ANION GAP SERPL CALCULATED.3IONS-SCNC: 8 MMOL/L (ref 7–16)
BUN BLDV-MCNC: 14 MG/DL (ref 6–20)
CALCIUM IONIZED: 1.3 MMOL/L (ref 1.15–1.33)
CALCIUM SERPL-MCNC: 8.2 MG/DL (ref 8.6–10.2)
CHLORIDE BLD-SCNC: 113 MMOL/L (ref 98–107)
CO2: 24 MMOL/L (ref 22–29)
CREAT SERPL-MCNC: 3.5 MG/DL (ref 0.5–1)
GFR AFRICAN AMERICAN: 19
GFR NON-AFRICAN AMERICAN: 19 ML/MIN/1.73
GLUCOSE BLD-MCNC: 94 MG/DL (ref 74–99)
HAV IGM SER IA-ACNC: NORMAL
HCT VFR BLD CALC: 25.9 % (ref 34–48)
HEMOGLOBIN: 8.4 G/DL (ref 11.5–15.5)
HEPATITIS B CORE IGM ANTIBODY: NORMAL
HEPATITIS B SURFACE ANTIGEN INTERPRETATION: NORMAL
HEPATITIS C ANTIBODY INTERPRETATION: NORMAL
MCH RBC QN AUTO: 26.2 PG (ref 26–35)
MCHC RBC AUTO-ENTMCNC: 32.4 % (ref 32–34.5)
MCV RBC AUTO: 80.7 FL (ref 80–99.9)
METER GLUCOSE: 120 MG/DL (ref 74–99)
METER GLUCOSE: 168 MG/DL (ref 74–99)
METER GLUCOSE: 336 MG/DL (ref 74–99)
METER GLUCOSE: 69 MG/DL (ref 74–99)
PDW BLD-RTO: 20.9 FL (ref 11.5–15)
PLATELET # BLD: 74 E9/L (ref 130–450)
PLATELET CONFIRMATION: NORMAL
PMV BLD AUTO: 10.6 FL (ref 7–12)
POTASSIUM SERPL-SCNC: 4.7 MMOL/L (ref 3.5–5)
RBC # BLD: 3.21 E12/L (ref 3.5–5.5)
SODIUM BLD-SCNC: 145 MMOL/L (ref 132–146)
WBC # BLD: 6.2 E9/L (ref 4.5–11.5)

## 2022-01-28 PROCEDURE — 99231 SBSQ HOSP IP/OBS SF/LOW 25: CPT | Performed by: INTERNAL MEDICINE

## 2022-01-28 PROCEDURE — 6370000000 HC RX 637 (ALT 250 FOR IP): Performed by: INTERNAL MEDICINE

## 2022-01-28 PROCEDURE — 36415 COLL VENOUS BLD VENIPUNCTURE: CPT

## 2022-01-28 PROCEDURE — 80048 BASIC METABOLIC PNL TOTAL CA: CPT

## 2022-01-28 PROCEDURE — 6360000002 HC RX W HCPCS

## 2022-01-28 PROCEDURE — 82962 GLUCOSE BLOOD TEST: CPT

## 2022-01-28 PROCEDURE — 6360000002 HC RX W HCPCS: Performed by: INTERNAL MEDICINE

## 2022-01-28 PROCEDURE — 99233 SBSQ HOSP IP/OBS HIGH 50: CPT | Performed by: PHYSICIAN ASSISTANT

## 2022-01-28 PROCEDURE — 6360000002 HC RX W HCPCS: Performed by: FAMILY MEDICINE

## 2022-01-28 PROCEDURE — 85027 COMPLETE CBC AUTOMATED: CPT

## 2022-01-28 PROCEDURE — 90935 HEMODIALYSIS ONE EVALUATION: CPT

## 2022-01-28 PROCEDURE — 82330 ASSAY OF CALCIUM: CPT

## 2022-01-28 PROCEDURE — 6370000000 HC RX 637 (ALT 250 FOR IP): Performed by: FAMILY MEDICINE

## 2022-01-28 RX ORDER — PREGABALIN 50 MG/1
50 CAPSULE ORAL 2 TIMES DAILY
Qty: 5 CAPSULE | Refills: 0 | Status: ON HOLD | OUTPATIENT
Start: 2022-01-28 | End: 2022-02-03 | Stop reason: SDUPTHER

## 2022-01-28 RX ORDER — DILTIAZEM HCL 90 MG
90 TABLET ORAL 4 TIMES DAILY
Qty: 120 TABLET | Refills: 3
Start: 2022-01-28 | End: 2022-01-28

## 2022-01-28 RX ORDER — ESCITALOPRAM OXALATE 10 MG/1
5 TABLET ORAL DAILY
Status: DISCONTINUED | OUTPATIENT
Start: 2022-01-28 | End: 2022-01-28 | Stop reason: HOSPADM

## 2022-01-28 RX ORDER — HYDROXYZINE PAMOATE 25 MG/1
25 CAPSULE ORAL 3 TIMES DAILY PRN
Qty: 1 CAPSULE | Refills: 0
Start: 2022-01-28 | End: 2022-02-11

## 2022-01-28 RX ORDER — ESCITALOPRAM OXALATE 5 MG/1
5 TABLET ORAL DAILY
Qty: 30 TABLET | Refills: 3 | Status: ON HOLD
Start: 2022-01-28 | End: 2022-03-04 | Stop reason: HOSPADM

## 2022-01-28 RX ORDER — DILTIAZEM HCL 90 MG
90 TABLET ORAL 4 TIMES DAILY
Qty: 120 TABLET | Refills: 3 | Status: ON HOLD
Start: 2022-01-28 | End: 2022-04-15 | Stop reason: HOSPADM

## 2022-01-28 RX ADMIN — EPOETIN ALFA-EPBX 2000 UNITS: 2000 INJECTION, SOLUTION INTRAVENOUS; SUBCUTANEOUS at 12:46

## 2022-01-28 RX ADMIN — ESCITALOPRAM 5 MG: 10 TABLET, FILM COATED ORAL at 12:51

## 2022-01-28 RX ADMIN — HEPARIN 100 UNITS: 100 SYRINGE at 11:06

## 2022-01-28 RX ADMIN — EPOETIN ALFA-EPBX 3000 UNITS: 3000 INJECTION, SOLUTION INTRAVENOUS; SUBCUTANEOUS at 12:46

## 2022-01-28 RX ADMIN — LEVOTHYROXINE SODIUM 75 MCG: 0.03 TABLET ORAL at 06:19

## 2022-01-28 RX ADMIN — DILTIAZEM HYDROCHLORIDE 90 MG: 30 TABLET, FILM COATED ORAL at 00:48

## 2022-01-28 RX ADMIN — METOCLOPRAMIDE HYDROCHLORIDE 5 MG: 5 TABLET ORAL at 11:07

## 2022-01-28 RX ADMIN — HEPARIN SODIUM 5000 UNITS: 10000 INJECTION INTRAVENOUS; SUBCUTANEOUS at 06:21

## 2022-01-28 RX ADMIN — PANTOPRAZOLE SODIUM 40 MG: 40 TABLET, DELAYED RELEASE ORAL at 16:27

## 2022-01-28 RX ADMIN — ACETAMINOPHEN 1000 MG: 500 TABLET ORAL at 09:20

## 2022-01-28 RX ADMIN — METOCLOPRAMIDE HYDROCHLORIDE 5 MG: 5 TABLET ORAL at 16:27

## 2022-01-28 RX ADMIN — DILTIAZEM HYDROCHLORIDE 90 MG: 30 TABLET, FILM COATED ORAL at 12:45

## 2022-01-28 RX ADMIN — PANTOPRAZOLE SODIUM 40 MG: 40 TABLET, DELAYED RELEASE ORAL at 06:19

## 2022-01-28 RX ADMIN — ACETAMINOPHEN 1000 MG: 500 TABLET ORAL at 16:27

## 2022-01-28 RX ADMIN — PREGABALIN 50 MG: 50 CAPSULE ORAL at 09:20

## 2022-01-28 ASSESSMENT — PAIN SCALES - GENERAL
PAINLEVEL_OUTOF10: 6
PAINLEVEL_OUTOF10: 8
PAINLEVEL_OUTOF10: 9
PAINLEVEL_OUTOF10: 8

## 2022-01-28 NOTE — PROGRESS NOTES
Heart monitor placed in storage, midline removed. Nurse to nurse called to Jesus Self at [facility], paperwork faxed to facility.  Patient discharged in stable condition, with all belongings and paperwork via transport Elijah Garcia

## 2022-01-28 NOTE — FLOWSHEET NOTE
01/28/22 1008   Vital Signs   /80   Temp 98.5 °F (36.9 °C)   Pulse 98   Weight 141 lb 1.5 oz (64 kg)   Weight Method Bed scale   Percent Weight Change -3.03   Post-Hemodialysis Assessment   Post-Treatment Procedures Blood returned;Catheter capped, clamped and heparinized x 2 ports   Machine Disinfection Process Exterior Machine Disinfection   Rinseback Volume (ml) 300 ml   Total Liters Processed (l/min) 66.9 l/min   Dialyzer Clearance Lightly streaked   Duration of Treatment (minutes) 180 minutes   Heparin amount administered during treatment (units) 0 units   Hemodialysis Intake (ml) 300 ml   Hemodialysis Output (ml) 2300 ml   NET Removed (ml) 2000 ml   Tolerated Treatment Good   Patient Response to Treatment tolerated well, blood returned, cath caer per policy/procedure, lines flushed, heparin instilled, ports capped

## 2022-01-28 NOTE — CARE COORDINATION
Late call received from Ras Kennedy, [address], re: referral for new HD chair time as an outpatient for transition of care. Mik Smith is the assigned care coordinator for the case. Return call placed to Mik Smith and detailed VM left explaining that patient is medically stable to discharge and we would like her start of care outpatient to be Monday at their [facility] location. Phone number left for return call. Await return call with chair time. Will continue to follow.      Jael Mcwilliams RN.  [facility]  [phone]

## 2022-01-28 NOTE — DISCHARGE SUMMARY
Patient: Bonnie Bennett Sex: female DOA: 2022   YOB: 1990 Age: 36 y.o. LOS:  LOS: 13 days    Admit Date: 2022   Discharge Date: 22   Primary Care Physician: Kellen Alcazar MD   Discharge to: skilled nursing  Readmission risk: [address] [facility] admission:  Per HPI:\" Ms. Bonnie Bennett, a 36y.o. year old female  who  has a past medical history of Acute congestive heart failure ([address] BC (acute kidney injury) ([address] Cardiac arrest ([address] Cephalgia, Chronic kidney disease, Depression, Diabetes mellitus ([address] Diabetic gastroparesis associated with type 1 diabetes mellitus ([address] Diabetic ketoacidosis ([address] Diabetic ketoacidosis with coma associated with type 1 diabetes mellitus ([address] Diabetic polyneuropathy associated with type 1 diabetes mellitus ([address] Drug use complicating pregnancy in third trimester, Endocarditis, ESRD (end stage renal disease) ([address] H/O cardiovascular stress test, Hemodialysis patient ([address] History of blood transfusion, Hyperlipidemia, Hyperosmolar hyperglycemic state (HHS) ([address] Hypothyroidism, Iron deficiency anemia, MDRO (multiple drug resistant organisms) resistance, MRSA (methicillin resistant Staphylococcus aureus), Non compliance w medication regimen, Other disorders of kidney and ureter, Pregnancy, Previous  delivery affecting pregnancy, antepartum, Previous stillbirth or demise, antepartum, Seizure ([address] Severe pre-eclampsia in third trimester, Shock liver, and Valvular endocarditis.      Patient presented to the emergency with complaints of nausea and vomiting. She has vomited 7 times today. Not able to keep anything down. Blood sugars are running high. Has a history of recent COVID diagnosis. Vital signs reveal the patient be afebrile. Tachycardic with a rate of 117. Currently heart rate 97. Blood pressure within normal meds and stable. Not hypoxic with an SPO2 100% on room air.   Laboratory studies reveal potassium 3.2, creatinine 4.0, anion gap 18, glucose 327, calcium 7.4, troponin 162, alk phos 204, beta hydroxybutyrate >4.50, hemoglobin 9.6. COVID-19 negative. pH 7.31. Patient was started on IV fluids and insulin infusion. Patient will be admitted for further treatment of DKA. Medicine consulted for admission.  VANTAGE POINT OF [facility] Course and discharge assessment with plan:     DKA (resolved) in pt with uncontrolled DM1 and very insulin sensitive  C/w adjsuted insulin regimen per Endo  F/u with endocrine as OP    Sinus Tachycardia  Started on Cardizem with parameters and hold morning of Hemodialysis    Hypokalemia, resolved    Elevated troponin in the setting of ESRD    End-stage renal disease on hemodialysis    Chronic anemia  S/p PRBCs  Bleeding scan is negative    History of Cardiac arrest    Diabetic gastroparesis  No narcotics unless clearly and absolutely indicated    History of Endocarditis    Physical deconditioning    Chronic malnutrition    Hx/o noncompliance with medications  Drug-seeking behavior  Pain mx recommended OP f/u with rheumatology for \"all over\" body pain    Frequent hospital admission  Pt had 12 admission in last 12 months    Depression  Start small dose lexapro    Long term prognosis guarded to poor    Discharge plan of care was discussed with: pt, RN, CM    Discharge Diagnoses:   Patient Active Problem List   Diagnosis    Non compliance w medication regimen    Tobacco smoking complicating pregnancy    MTHFR mutation    Generalized abdominal pain    Diabetic ketoacidosis without coma associated with type 1 diabetes mellitus ([address]    Hypertension    Prolonged QT interval    Iron deficiency anemia    Sinus tachycardia    Bladder dysfunction    Hypokalemia    Severe protein-calorie malnutrition (HCC)    Uncontrolled type 1 diabetes mellitus with hyperglycemia (HCC)    End stage renal disease ([address]    Hypothyroidism    Hyperlipidemia    Acute cystitis    Nondisplaced fracture of neck of fifth metacarpal bone, left hand, initial encounter for closed fracture    Chronic right-sided low back pain    Endocarditis    Seizure ([facility]    Hyperkalemia    Hypomagnesemia    Hypocalcemia    Intractable nausea and vomiting    Wound of left leg, sequela    Syncope    Type 1 diabetes mellitus without complication ([facility])    Hyperosmolality    Major depressive disorder    Lactic acid acidosis    Early satiety    Acute on chronic systolic CHF (congestive heart failure) ([facility])    Atypical chest pain    Chronic renal failure syndrome    Septic shock ([facility])    ESRD (end stage renal disease) ([facility])    Hyperosmolar hyperglycemic state (HHS) ([address]    Hypertensive urgency    Nausea    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) ([address]    ESRD (end stage renal disease) on dialysis ([address]    AMS (altered mental status)    Opioid overdose ([address]    Anemia    Type 1 diabetes mellitus with chronic kidney disease on chronic dialysis ([facility])    Elevated TSH    Diabetic acidosis without coma [facility]    DKA, type 1, not at goal [facility])    COVID-19 virus infection    Frequent hospital admissions    Diabetic gastroparesis ([facility]    Diffuse pain    Poor prognosis    Physical deconditioning    Declining functional status    Lack of motivation    Drug-seeking behavior    Behavior problem, adult       Discharge Medications:   Current Discharge Medication List           Details   hydrOXYzine (VISTARIL) 25 MG capsule Take 1 capsule by mouth 3 times daily as needed for Anxiety (sleep)  Qty: 1 capsule, Refills: 0      insulin lispro (HUMALOG) 100 UNIT/ML injection vial Inject 0-3 Units into the skin 4 times daily (before meals and nightly)  Qty: 10 mL, Refills: 3      escitalopram (LEXAPRO) 5 MG tablet Take 1 tablet by mouth daily  Qty: 30 tablet, Refills: 3      dilTIAZem (CARDIZEM) 90 MG tablet Take 1 tablet by mouth 4 times daily Hold the morning of Hemodialysis  Hold For SBP <100, HR<55  Qty: 120 tablet, Refills: 3 Nutritional Supplements (GLUCOSE MANAGEMENT) TABS Take 1 tablet by mouth as needed (for blood glucose <70 or symptomatic low blood glucose)  Qty: 60 tablet, Refills: 0      cholestyramine (QUESTRAN) 4 g packet Take 1 packet by mouth 2 times daily  Qty: 90 packet, Refills: 0              Details   pregabalin (LYRICA) 50 MG capsule Take 1 capsule by mouth 2 times daily for 30 days.   Qty: 5 capsule, Refills: 0    Associated Diagnoses: Uncontrolled type 1 diabetes mellitus with hyperglycemia (HCC)      insulin glargine (LANTUS) 100 UNIT/ML injection vial Inject 1 Units into the skin every morning  Qty: 10 mL, Refills: 3              Details   promethazine (PHENERGAN) 25 MG tablet Take 25 mg by mouth every 6 hours as needed for Nausea      ondansetron (ZOFRAN) 4 MG tablet Take 4 mg by mouth every 8 hours as needed for Nausea or Vomiting      albuterol sulfate  (90 Base) MCG/ACT inhaler Inhale 4 puffs into the lungs as needed for Wheezing or Shortness of Breath  Qty: 18 g, Refills: 0      sevelamer (RENVELA) 800 MG tablet TAKE 2 TABLETS BY MOUTH 3 TIMES A DAY WITH MEALS AND TAKE 1 TABLET EVERY DAY AT BEDTIME      pantoprazole (PROTONIX) 40 MG tablet Take 1 tablet by mouth 2 times daily  Qty: 30 tablet, Refills: 5    Associated Diagnoses: Gastroparesis      docusate sodium (COLACE) 100 MG capsule Take 1 capsule by mouth daily  Qty: 30 capsule, Refills: 0      sennosides-docusate sodium (SENOKOT-S) 8.6-50 MG tablet Take 2 tablets by mouth nightly as needed for Constipation      vitamin D (ERGOCALCIFEROL) 1.25 MG (21978 UT) CAPS capsule Take 1 capsule by mouth once a week  Qty: 5 capsule, Refills: 0      mirtazapine (REMERON) 15 MG tablet Take 15 mg by mouth nightly      polyethylene glycol (GLYCOLAX) 17 g packet Take 17 g by mouth daily as needed for Constipation      ARIPiprazole (ABILIFY) 5 MG tablet Take 5 mg by mouth nightly      levothyroxine (SYNTHROID) 75 MCG tablet TAKE 1 TABLET BY MOUTH IN THE MORNING BEFORE BREAKFAST  Qty: 60 tablet, Refills: 0      rOPINIRole (REQUIP) 0.25 MG tablet Take 0.25 mg by mouth nightly              Follow-up: PCP in 3-5 days, endocrine, rheumatology    Discharge Condition: stable    Discharge instruction:   Patient instructed to return to the emergency department if: Chest pain, shortness of breath, altered mental status, fever, chills, nausea, vomiting, abdominal pain or any other concerns. Activity: progressive as tolerated.     Diet: diabetic diet and renal diet     Wound Care: none needed     Consults: nephrology and endocrine, GI       Discharge Physical Exam:   See progress note from earlier today      Noman Mcmahon MD   01/28/22 2:18 PM    Hospital Medicine   Time Spent: 50 min

## 2022-01-28 NOTE — PROGRESS NOTES
[facility] Pain Management  IP Follow Up Note      S:  Pain is unchanged today. States no improvement with lyrica change.       Past Medical History:   Diagnosis Date    Acute congestive heart failure ([address]     BC (acute kidney injury) ([address] 10/01/2019    Cardiac arrest ([address] 02/15/2021    Cephalgia 10/09/2019    Chronic kidney disease     Depression     Diabetes mellitus ([address]     Diabetic gastroparesis associated with type 1 diabetes mellitus ([address] 2018    Diabetic ketoacidosis ([address] 2011    Diabetic ketoacidosis with coma associated with type 1 diabetes mellitus ([address] 2013    Diabetic polyneuropathy associated with type 1 diabetes mellitus ([address] 2020    Drug use complicating pregnancy in third trimester     Endocarditis 10/31/2020    ESRD (end stage renal disease) ([address] 2020    H/O cardiovascular stress test 2021    Lexiscan    Hemodialysis patient [facility])     History of blood transfusion 2019    Hyperlipidemia 10/08/2020    Hyperosmolar hyperglycemic state (HHS) ([address] 2020    Hypothyroidism 10/08/2020    Iron deficiency anemia 10/01/2019    MDRO (multiple drug resistant organisms) resistance     MRSA (methicillin resistant Staphylococcus aureus)     back wound abcess    Non compliance w medication regimen 2016    Other disorders of kidney and ureter     Pregnancy 2016    16 weeks    Previous  delivery affecting pregnancy, antepartum 2017    Previous stillbirth or demise, antepartum 2016    Seizure ([address] 2020    Severe pre-eclampsia in third trimester 2016    Shock liver 02/15/2021    Valvular endocarditis 11/10/2020    This Diagnosis was added to the Problem List based on transcribed orders from Dr. Danielle Lopez          Past Surgical History:   Procedure Laterality Date    BACK SURGERY      abscess    CATHETER REMOVAL N/A 10/1/2021    PERITONEAL CATHETER REMOVAL performed by Olga Roque Kenisha Ornelas MD at [facility]      x2    CHOLECYSTECTOMY, LAPAROSCOPIC N/A 11/7/2019    CHOLECYSTECTOMY LAPAROSCOPIC performed by Rosette Andrew MD at [facility] N/A 6/25/2018    COLONOSCOPY WITH BIOPSY performed by Kiersten Myers MD at [address] [place] [address] COLONOSCOPY N/A 12/18/2018    COLONOSCOPY WITH BIOPSY performed by Dion Babin MD at [address]  1/31/2012    EF 57%    ECHO COMPL W DOP COLOR FLOW  6/10/2013         EMBOLECTOMY N/A 11/6/2020    [facility], [id number] [place] [address], YULISSA -- REQS ROOM 3 performed by Orquidea Monet MD at [address] [place] [address] Left 2/23/2021    LEFT LEG INCISION AND DRAINAGE, DEBRIDEMENT, WOUND VAC APPLICATION performed by Obey Lawson DPM at [address] [place] [address] Left 5/18/2021    LEFT LEG DEBRIDEMENT BIOPSY POSS APPLICATION WOUND VAC performed by Obey Lawson DPM at [place] [address] N/A 6/26/2020    LAPAROSCOPIC INSERTION PERITONEAL DIALYSIS CATHETER performed by Timothy Dennis MD at [facility] ESOPHAGOGASTRODUODENOSCOPY TRANSORAL DIAGNOSTIC N/A 5/30/2018    EGD ESOPHAGOGASTRODUODENOSCOPY performed by Kiersten Myers MD at [address] [place] [address] TRANSESOPHAGEAL ECHOCARDIOGRAM N/A 10/19/2020    TRANSESOPHAGEAL ECHOCARDIOGRAM WITH BUBBLE STUDY performed by Linda Woo MD at [address] [place] [address] TUNNELED VENOUS PORT PLACEMENT  04/2018    UPPER GASTROINTESTINAL ENDOSCOPY  12/18/2018    EGD BIOPSY performed by Dion Babin MD at [address] [place] [address] 10/11/2019    EGD ESOPHAGOGASTRODUODENOSCOPY performed by Simi Byrne DO at [facility] N/A 7/14/2021    EGD BIOPSY performed by Margot Hall MD at [place] [address]       Prior to Admission medications    Medication Sig Start Date End Date Taking?  Authorizing Provider   hydrOXYzine (VISTARIL) 25 MG capsule Take 1 capsule by mouth 3 times daily as needed for Anxiety (sleep) 1/28/22 2/11/22 Yes Himanshu Frances MD   pregabalin (LYRICA) 50 MG capsule Take 1 capsule by mouth 2 times daily for 30 days.  1/28/22 2/27/22 Yes Himanshu Frances MD   insulin lispro (HUMALOG) 100 UNIT/ML injection vial Inject 0-3 Units into the skin 4 times daily (before meals and nightly) 1/28/22  Yes Himanshu Frances MD   escitalopram (LEXAPRO) 5 MG tablet Take 1 tablet by mouth daily 1/28/22  Yes Himanshu Frances MD   dilTIAZem (CARDIZEM) 90 MG tablet Take 1 tablet by mouth 4 times daily Hold the morning of Hemodialysis  Hold For SBP <100, HR<55 1/28/22  Yes Himanshu Frances MD   insulin glargine (LANTUS) 100 UNIT/ML injection vial Inject 1 Units into the skin every morning 1/26/22  Yes Himanshu Frances MD   Nutritional Supplements (GLUCOSE MANAGEMENT) TABS Take 1 tablet by mouth as needed (for blood glucose <70 or symptomatic low blood glucose) 1/25/22  Yes Himanshu Frances MD   cholestyramine Idamae Prakash) 4 g packet Take 1 packet by mouth 2 times daily 1/24/22  Yes Himanshu Frances MD   promethazine (PHENERGAN) 25 MG tablet Take 25 mg by mouth every 6 hours as needed for Nausea   Yes Historical Provider, MD   ondansetron (ZOFRAN) 4 MG tablet Take 4 mg by mouth every 8 hours as needed for Nausea or Vomiting   Yes Historical Provider, MD   albuterol sulfate  (90 Base) MCG/ACT inhaler Inhale 4 puffs into the lungs as needed for Wheezing or Shortness of Breath 1/5/22   Ghulam Lopez DO   sevelamer (RENVELA) 800 MG tablet TAKE 2 TABLETS BY MOUTH 3 TIMES A DAY WITH MEALS AND TAKE 1 TABLET EVERY DAY AT BEDTIME    Historical Provider, MD   pantoprazole (PROTONIX) 40 MG tablet Take 1 tablet by mouth 2 times daily 12/17/21   Caryle Alderman, MD   docusate sodium (COLACE) 100 MG capsule Take 1 capsule by mouth daily  Patient taking differently: Take 100 mg by mouth daily as needed  12/13/21   Migel Mike MD   sennosides-docusate sodium (SENOKOT-S) 8.6-50 MG tablet Take 2 tablets by mouth nightly as needed for Constipation  Patient not taking: Reported on 2021   Lyndon Savage PA-C   vitamin D (ERGOCALCIFEROL) 1.25 MG (71405 UT) CAPS capsule Take 1 capsule by mouth once a week 9/3/21   Kiersten Sood MD   mirtazapine (REMERON) 15 MG tablet Take 15 mg by mouth nightly    Historical Provider, MD   polyethylene glycol (GLYCOLAX) 17 g packet Take 17 g by mouth daily as needed for Constipation  Patient not taking: Reported on 2021    Historical Provider, MD   ARIPiprazole (ABILIFY) 5 MG tablet Take 5 mg by mouth nightly 21   Historical Provider, MD   levothyroxine (SYNTHROID) 75 MCG tablet TAKE 1 TABLET BY MOUTH IN THE MORNING BEFORE BREAKFAST 21   Jacinda Littlejohn DO   rOPINIRole (REQUIP) 0.25 MG tablet Take 0.25 mg by mouth nightly    Historical Provider, MD       Allergies   Allergen Reactions    Cefepime Other (See Comments)     \" neurological side effect- slurred speech and confusion    Toradol [Ketorolac Tromethamine] Anaphylaxis and Hives       Social History     Socioeconomic History    Marital status: Single     Spouse name: Not on file    Number of children: 2    Years of education: Not on file    Highest education level: Not on file   Occupational History    Not on file   Tobacco Use    Smoking status: Former Smoker     Packs/day: 0.50     Years: 6.00     Pack years: 3.00     Types: Cigarettes     Quit date: 2021     Years since quittin.0    Smokeless tobacco: Never Used    Tobacco comment: vaper cig   Vaping Use    Vaping Use: Never used   Substance and Sexual Activity    Alcohol use: No    Drug use: Yes     Types: Opiates      Comment: documented prior history of opioid abuse    Sexual activity: Yes     Partners: Male   Other Topics Concern    Not on file   Social History Narrative    Not on file     Social Determinants of Health     Financial Resource Strain:     Difficulty of Paying Living Expenses: Not on file   Food Insecurity:     Worried About [employer] in the Last Year: Not on file    [employer] in the Last Year: Not on file   Transportation Needs:     Lack of Transportation (Medical): Not on file    Lack of Transportation (Non-Medical): Not on file   Physical Activity:     Days of Exercise per Week: Not on file    Minutes of Exercise per Session: Not on file   Stress:     Feeling of Stress : Not on file   Social Connections:     Frequency of Communication with Friends and Family: Not on file    Frequency of Social Gatherings with Friends and Family: Not on file    Attends Faith Services: Not on file    Active Member of Clubs or Organizations: Not on file    Attends Club or Organization Meetings: Not on file    Marital Status: Not on file   Intimate Partner Violence:     Fear of Current or Ex-Partner: Not on file    Emotionally Abused: Not on file    Physically Abused: Not on file    Sexually Abused: Not on file   Housing Stability:     Unable to Pay for Housing in the Last Year: Not on file    Number of [place] in the Last Year: Not on file    Unstable Housing in the Last Year: Not on file       Family History   Problem Relation Age of Onset    Asthma Mother     Hypertension Mother     High Blood Pressure Mother     Diabetes Mother     Asthma Brother     High Blood Pressure Father        PHYSICAL EXAMINATION:      BP (!) 142/107   Pulse 103   Temp 97.5 °F (36.4 °C) (Temporal)   Resp 18   Ht 5' 4\" (1.626 m)   Wt 141 lb 1.5 oz (64 kg)   SpO2 100%   BMI 24.22 kg/m²     PHYSICAL EXAMINATION:   General appearance: in no distress with movement in bed, alert and oriented x3 pleasant and cooperative   Build: obese   Function: Laying in bed  HEENT:   Head: normocephalic and atraumatic   Pupils: regular, round and equal.   Sclera: icterus absent  Lungs:   No respiratory distress. Symmetrical expansion.    Extremities:   Tremors: No  Intact:Yes   Varicose veins: absent   Cyanosis:none   Edema: None   Neurological:   Gait: unable to assess   Dermatology:   Skin:no unusual rashes, no skin lesions, no palpable subcutaneous nodules and good skin turgor     Inpatient Medication regimen:   Current Facility-Administered Medications             Current Facility-Administered Medications   Medication Dose Route Frequency Provider Last Rate Last Admin    dilTIAZem (CARDIZEM) tablet 60 mg  60 mg Oral 4 times per day Kerri Thomas MD   60 mg at 01/27/22 1209    insulin lispro (HUMALOG) injection vial 0-3 Units  0-3 Units SubCUTAneous 4x Daily AC & HS Zeke Mimi Castañeda MD        labetalol (NORMODYNE;TRANDATE) injection 10 mg  10 mg IntraVENous Q4H PRN Stacie Tovar DO   10 mg at 01/26/22 1033    hydrOXYzine (VISTARIL) capsule 25 mg  25 mg Oral TID PRN Kerri Thomas MD   25 mg at 01/27/22 0849    insulin glargine-yfgn (SEMGLEE-YFGN) injection vial 1 Units  1 Units SubCUTAneous GILBERTO Steiner MD   1 Units at 01/27/22 0587    acetaminophen (TYLENOL) tablet 1,000 mg  1,000 mg Oral Q6H PRN Kerri Thomas MD   1,000 mg at 01/27/22 1210    lidocaine 1 % injection 5 mL  5 mL IntraDERmal Once Frankie Keenan MD        sodium chloride flush 0.9 % injection 5-40 mL  5-40 mL IntraVENous 2 times per day Frankie Keenan MD   10 mL at 01/26/22 2125    sodium chloride flush 0.9 % injection 5-40 mL  5-40 mL IntraVENous PRN Joe Worley MD        0.9 % sodium chloride infusion  25 mL IntraVENous PRN Joe Worley MD        heparin flush 100 UNIT/ML injection 100 Units  1 mL IntraVENous 2 times per day Frankie Keenan MD   100 Units at 01/27/22 0848    heparin flush 100 UNIT/ML injection 100 Units  1 mL IntraCATHeter PRN Frankie Keenan MD   100 Units at 01/24/22 1321    vitamin D (ERGOCALCIFEROL) capsule 50,000 Units  50,000 Units Oral Q14 Days Joe Worley MD        epoetin nena-epbx (RETACRIT) injection 2,000 Units  2,000 Units SubCUTAneous Once per day on Mon Wed Fri Sophia Arnold, APRN - CNP   2,000 Units at 01/26/22 1057     And    epoetin nena-epbx (RETACRIT) injection 3,000 Units  3,000 Units SubCUTAneous Once per day on Mon Wed Fri Sophiajose Arnold, APRN - CNP   3,000 Units at 01/26/22 1057    metoclopramide (REGLAN) tablet 5 mg  5 mg Oral 4x Daily AC & HS Giovana Boykin MD   5 mg at 01/27/22 1210    cholestyramine (QUESTRAN) packet 4 g  1 packet Oral BID Hardeep Donald MD   4 g at 01/27/22 0848    glucose (GLUTOSE) 40 % oral gel 15 g  15 g Oral PRN Dhaval Haas MD   30 g at 01/21/22 1727    dextrose 50 % IV solution  12.5 g IntraVENous PRN Dhaval Haas MD   12.5 g at 01/25/22 0639    glucagon (rDNA) injection 1 mg  1 mg IntraMUSCular PRN Dhaval Haas MD   1 mg at 01/22/22 2698    dextrose 5 % solution  100 mL/hr IntraVENous PRN Dhaval Haas MD   Stopped at 01/23/22 0915    pregabalin (LYRICA) capsule 75 mg  75 mg Oral Daily Gabbi Knutson MD   75 mg at 01/27/22 0848    ARIPiprazole (ABILIFY) tablet 5 mg  5 mg Oral Nightly Oumar Congo, DO   5 mg at 01/26/22 2124    levothyroxine (SYNTHROID) tablet 75 mcg  75 mcg Oral Daily Oumar Congo, DO   75 mcg at 01/27/22 0557    mirtazapine (REMERON) tablet 15 mg  15 mg Oral Nightly Oumar Congo, DO   15 mg at 01/26/22 2124    pantoprazole (PROTONIX) tablet 40 mg  40 mg Oral BID AC Oumar Congo, DO   40 mg at 01/27/22 0557    rOPINIRole (REQUIP) tablet 0.25 mg  0.25 mg Oral Nightly Oumar Congo, DO   0.25 mg at 01/26/22 2125    dextrose 50 % IV solution  12.5 g IntraVENous PRN Oumar Congo, DO   12.5 g at 01/23/22 0201    polyethylene glycol (GLYCOLAX) packet 17 g  17 g Oral Daily PRN Oumar Congo, DO        heparin (porcine) injection 5,000 Units  5,000 Units SubCUTAneous Q8H Oumar Simeon DO   5,000 Units at 01/27/22 0611    influenza quadrivalent split vaccine (FLUZONE;FLUARIX;FLULAVAL;AFLURIA) injection 0.5 mL  0.5 mL IntraMUSCular Prior to discharge Alyssa Batista DO                Impression:   All over body pain  Admitted for nausea/vomiting/DKA  Hx of type 1 DM on HD  Hx of cardiac arrest 2021  Hx of seizure  Recent COVID infection       Recommendation for plan of care:   1. Continue lyrica to 50 mg BID. Reports gabapentin ineffective in the past.  2.  No narcotics as above. Patient requesting narcotics for her pain today.   Discussed that this is not recommended per admitting team.   3.  Consider rheumatologic referral as an outpatient for all over body pain           MADDIE Mancuso and [facility] Management

## 2022-01-28 NOTE — CARE COORDINATION
Spoke with Malu Haynes, liaison with [facility]  For chair time. Per Malu Haynes, they need the Hepatis panel and information about her HD access. They are requesting OP notes. Explained Line has been in place since 11/11/2021 and per Nephrology NP, Mony Diego was placed by IR at [facility] in [facility]. Information passed on to Malu Haynes as well. Information on line that is available, along with today's flow sheet and hep panel faxed to Malu Haynes at [phone]. Spoke with Dr Masood Hunt. Patient OK to discharge if chair time and [facility] bed can be secured for transition of care today. Spoke with Latasha Henry, liaison with [facility]. They are unable to accept the patient. Spoke with Patricio Eddy, liaison with [facility]. Patient is accepted and can transition to their building today. PAtient can transition via ambulette. Patricio Eddy will arrange transportation. Transportation forms and envelope compelted. HENS compelted. CAITLIN compelted. Call placed to [facility] in [facility] and chair time confirmed for T-T-S at 7 am.  Patient will need to be there Tuesday morning at 6:30 am to complete her paperwork. Patricio Eddy notified of above and information written on CAITLIN. Call placed to the patient's mother [facility] and notified of above. She is in agreement with transition of care plans. Kari arranged transport with their Norma Dupont at [address] [facility] pick-up time. Charge nurse notified and call placed to the patient's mother to notify of transport time. Patient to discharge to [facility] today.      Joel Qureshi RN.  [facility]  [phone]

## 2022-01-28 NOTE — PROGRESS NOTES
ENDOCRINOLOGY PROGRESS NOTE      Date of admission: 1/14/2022  Date of service: 1/28/2022  Admitting physician: Jami Nevarez DO   Primary Care Physician: Jessie Souza MD  Consultant physician: Jam Culver MD     Reason for the consultation:  Uncontrolled DM, labile BG    History of Present Illness: The history is provided by the patient. Accuracy of the patient data is excellent    [address] Maldonado is a very pleasant 36 y.o. old female with PMH of Type I DM, ESRD on PD,HTN,hypothyroidism, infective endocarditis and other listed below admitted to [facility] on 1/14/2022 because of N/V, abdominal pain and found to be in DKA. Endocrine service was consulted for DM management.      Subjective   Seen and examined this AM, BG variable     Point of care glucose monitoring (Independently reviewed)   Recent Labs     01/26/22  1144 01/26/22  1251 01/26/22  1753 01/26/22  2122 01/27/22  0555 01/27/22  1117 01/27/22  1710 01/27/22 2010   GLUMET 85 98 247* 135* 306* 89 129* 226*       Scheduled Meds:   insulin lispro  0-3 Units SubCUTAneous 4x Daily AC & HS    dilTIAZem  90 mg Oral 4 times per day    pregabalin  50 mg Oral BID    insulin glargine-yfgn  1 Units SubCUTAneous QAM    lidocaine  5 mL IntraDERmal Once    sodium chloride flush  5-40 mL IntraVENous 2 times per day    heparin flush  1 mL IntraVENous 2 times per day    vitamin D  50,000 Units Oral Q14 Days    epoetin nena-epbx  2,000 Units SubCUTAneous Once per day on Mon Wed Fri    And    epoetin nena-epbx  3,000 Units SubCUTAneous Once per day on Mon Wed Fri    metoclopramide  5 mg Oral 4x Daily AC & HS    cholestyramine  1 packet Oral BID    ARIPiprazole  5 mg Oral Nightly    levothyroxine  75 mcg Oral Daily    mirtazapine  15 mg Oral Nightly    pantoprazole  40 mg Oral BID AC    rOPINIRole  0.25 mg Oral Nightly    heparin (porcine)  5,000 Units SubCUTAneous Q8H    influenza virus vaccine  0.5 mL IntraMUSCular Prior to discharge     PRN Meds: labetalol, 10 mg, Q4H PRN  hydrOXYzine, 25 mg, TID PRN  acetaminophen, 1,000 mg, Q6H PRN  sodium chloride flush, 5-40 mL, PRN  sodium chloride, 25 mL, PRN  heparin flush, 1 mL, PRN  glucose, 15 g, PRN  dextrose, 12.5 g, PRN  glucagon (rDNA), 1 mg, PRN  dextrose, 100 mL/hr, PRN  dextrose, 12.5 g, PRN  polyethylene glycol, 17 g, Daily PRN      Continuous Infusions:   sodium chloride      dextrose Stopped (01/23/22 0915)       Review of Systems  All systems reviewed. All negative except for symptoms mentioned in HPI     OBJECTIVE    /78   Pulse 78   Temp 97.3 °F (36.3 °C) (Oral)   Resp 16   Ht 5' 4\" (1.626 m)   Wt 142 lb 6.4 oz (64.6 kg)   SpO2 99%   BMI 24.44 kg/m²     Intake/Output Summary (Last 24 hours) at 1/28/2022 0604  Last data filed at 1/27/2022 2040  Gross per 24 hour   Intake 120 ml   Output --   Net 120 ml       Physical examination:   General: awake alert, oriented x3  HEENT: normocephalic non-traumatic, no exophthalmos   Neck: supple  Pulm: Clear equal air entry no added sounds, no wheezing or rhonchi    Chest: Vascular access Rt. Chest. Lt chest scar. CVS: S1 + S2, no murmur  Abd: soft lax, nontender. Normal bowel sounds  Skin: warm, no lesions, no rash.     Neuro: CN grossly intact  Psych: normal mood, and affect    Review of Laboratory Data:  I personally reviewed the following labs:   Recent Labs     01/26/22  1045   WBC 4.9   RBC 3.16*   HGB 8.4*   HCT 25.6*   MCV 81.0   MCH 26.6   MCHC 32.8   RDW 19.5*   PLT 76*   MPV NOT CALC     Recent Labs     01/26/22  1045 01/27/22  1117 01/28/22  0335    143 145   K 3.9 4.5 4.7    110* 113*   CO2 30* 26 24   BUN 6 11 14   CREATININE 1.7* 3.1* 3.5*   GLUCOSE 87 85 94   CALCIUM 7.8* 8.2* 8.2*     Beta-Hydroxybutyrate   Date Value Ref Range Status   01/14/2022 >4.50 (H) 0.02 - 0.27 mmol/L Final   12/31/2021 >4.50 (H) 0.02 - 0.27 mmol/L Final   11/22/2021 0.53 (H) 0.02 - 0.27 mmol/L Final     Lab Results   Component Value Date daily. She was missing doses   · To recheck TFT 6-8 weeks after discharge     N/V/ Diarrhea  · Resolved  · Symptomatic Management per primary and GI services    ESRD  · Pt at risk for hypoglycemia  · On dialysis, nephrology following    The above issues were reviewed with the patient who understood and agreed with the plan. Thank you for allowing us to participate in the care of this patient. Please do not hesitate to contact us with any additional questions. Ashley Nieves MD  Endocrinologist, [facility] and Endocrinology   [address] [place] [address]   Phone: [phone]  Fax: [phone]  ----------------------------  An electronic signature was used to authenticate this note.  Scar Almonte MD on 1/28/2022 at 6:04 AM

## 2022-01-28 NOTE — PROGRESS NOTES
Hospitalist Progress Note            Patient: Ousmane Cadena Age: 36 y.o.   DOA: 1/14/2022 Admit Dx / CC: DKA, type 1, not at goal [facility]) [E10.10]  Type 1 diabetes mellitus with ketoacidosis without coma ([facility] [address] [E10.10]  Nausea and vomiting, intractability of vomiting not specified, unspecified vomiting type [R11.2]   LOS:  LOS: 13 days      Assessment/ Plan:     DKA (resolved) in pt with uncontrolled DM1 and very insulin sensitive  C/w adjusted lantus insulin  Advised on small frequent meals  Advised to drink plenty of water  Cont ISS  Recent A1c 8.7    Tachycardia, improving  Started small dose CCB with parameters and titrate as needed.  Hold morning of HD     Hypokalemia, resolved     Elevated troponin in the setting of ESRD  Hepatitis panel negative    End-stage renal disease on hemodialysis    Chronic anemia  S/p PRBCs  Bleeding scan NEGATIVE     History of Cardiac arrest     Diabetic gastroparesis  Advised NO Narcotics as can worsen gastroparesis     History of Endocarditis    Physical deconditioning    Frequent hospital admission  Pt had 12 admission in last 12 months    Chronic malnutrition    Hx/o noncompliance with medications  Drug-seeking behavior  Pain mx consult appreciated and recommend OP f/u with rheumatology    Depression  Start small dose lexapro    Long term prognosis guarded to poor    DVT ppx: heparin  Code status: Full code     Medically stable for discharge pend CM arrangement for OP HD and SNF bed    Plan of care discussed with: patient and bedside RN, CM    Patient Active Problem List   Diagnosis    Non compliance w medication regimen    Tobacco smoking complicating pregnancy    MTHFR mutation    Generalized abdominal pain    Diabetic ketoacidosis without coma associated with type 1 diabetes mellitus ([facility] [address]    Hypertension    Prolonged QT interval    Iron deficiency anemia    Sinus tachycardia    Bladder dysfunction    Hypokalemia    Severe protein-calorie malnutrition ([facility] [address]    Uncontrolled type 1 diabetes mellitus with hyperglycemia ([facility] [address]    End stage renal disease ([facility] [address]    Hypothyroidism    Hyperlipidemia    Acute cystitis    Nondisplaced fracture of neck of fifth metacarpal bone, left hand, initial encounter for closed fracture    Chronic right-sided low back pain    Endocarditis    Seizure (HCC)    Hyperkalemia    Hypomagnesemia    Hypocalcemia    Intractable nausea and vomiting    Wound of left leg, sequela    Syncope    Type 1 diabetes mellitus without complication (HCC)    Hyperosmolality    Major depressive disorder    Lactic acid acidosis    Early satiety    Acute on chronic systolic CHF (congestive heart failure) (HCC)    Atypical chest pain    Chronic renal failure syndrome    Septic shock (HCC)    ESRD (end stage renal disease) ([facility]    Hyperosmolar hyperglycemic state (HHS) ([facility] [address]    Hypertensive urgency    Nausea    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) ([facility] [address]    ESRD (end stage renal disease) on dialysis ([facility] [address]    AMS (altered mental status)    Opioid overdose ([facility] [address]    Anemia    Type 1 diabetes mellitus with chronic kidney disease on chronic dialysis (HCC)    Elevated TSH    Diabetic acidosis without coma ([facility] [address]    DKA, type 1, not at goal [facility])    COVID-19 virus infection    Frequent hospital admissions    Diabetic gastroparesis ([facility]    Diffuse pain    Poor prognosis    Physical deconditioning    Declining functional status    Lack of motivation    Drug-seeking behavior    Behavior problem, adult        Medications:  Reviewed    Infusion Medications    sodium chloride      dextrose Stopped (01/23/22 2515)     Scheduled Medications    escitalopram  5 mg Oral Daily    insulin lispro  0-3 Units SubCUTAneous 4x Daily AC & HS    dilTIAZem  90 mg Oral 4 times per day    pregabalin  50 mg Oral BID    insulin glargine-yfgn  1 Units SubCUTAneous QAM    lidocaine  5 mL IntraDERmal Once    sodium chloride flush 5-40 mL IntraVENous 2 times per day    heparin flush  1 mL IntraVENous 2 times per day    vitamin D  50,000 Units Oral Q14 Days    epoetin nena-epbx  2,000 Units SubCUTAneous Once per day on Mon Wed Fri    And    epoetin nena-epbx  3,000 Units SubCUTAneous Once per day on Mon Wed Fri    metoclopramide  5 mg Oral 4x Daily AC & HS    cholestyramine  1 packet Oral BID    ARIPiprazole  5 mg Oral Nightly    levothyroxine  75 mcg Oral Daily    mirtazapine  15 mg Oral Nightly    pantoprazole  40 mg Oral BID AC    rOPINIRole  0.25 mg Oral Nightly    heparin (porcine)  5,000 Units SubCUTAneous Q8H    influenza virus vaccine  0.5 mL IntraMUSCular Prior to discharge     PRN Meds: labetalol, hydrOXYzine, acetaminophen, sodium chloride flush, sodium chloride, heparin flush, glucose, dextrose, glucagon (rDNA), dextrose, dextrose, polyethylene glycol    I/O    Intake/Output Summary (Last 24 hours) at 1/28/2022 1150  Last data filed at 1/28/2022 1008  Gross per 24 hour   Intake 420 ml   Output 2300 ml   Net -1880 ml       Labs:   Recent Labs     01/26/22  1045 01/28/22  1112   WBC 4.9 6.2   HGB 8.4* 8.4*   HCT 25.6* 25.9*   PLT 76* 74*       Recent Labs     01/26/22  1045 01/27/22  1117 01/28/22  0335    143 145   K 3.9 4.5 4.7    110* 113*   CO2 30* 26 24   BUN 6 11 14   CREATININE 1.7* 3.1* 3.5*   CALCIUM 7.8* 8.2* 8.2*       No results for input(s): PROT, ALB, ALKPHOS, ALT, AST, BILITOT, AMYLASE, LIPASE in the last 72 hours. No results for input(s): INR in the last 72 hours. No results for input(s): Lavelle Brock in the last 72 hours. Chronic labs:  Lab Results   Component Value Date    CHOL 95 01/14/2020    TRIG 82 01/14/2020    HDL 33 01/14/2020    LDLCALC 46 01/14/2020    TSH 4.710 (H) 01/23/2022    INR 1.1 12/08/2021    LABA1C 8.7 (H) 12/08/2021       Radiology:  Imaging studies reviewed today.      Subjective:     Wilton Flores c/o pain \"all over\" still but no tearful  Says eating well. Told her can have food from home    Objective:     Physical Exam:   /83   Pulse 108   Temp 96.9 °F (36.1 °C) (Temporal)   Resp 18   Ht 5' 4\" (1.626 m)   Wt 141 lb 1.5 oz (64 kg)   SpO2 100%   BMI 24.22 kg/m²     General appearance:in no distress, flat in bed   Lungs: Clear to auscultation bilaterally, no wheezing or crackles   Heart: Regular rate and rhythm, S1, S2 normal   Abdomen: Soft, non-tender and not-distended. Bowel sounds normal. No guarding or rigidity  Extremities: no edema   Neurologic: Grossly normal and non focal, CN II-XII intact.        Marjan Gallego MD   Hospitalist Service   01/28/22 11:50 AM

## 2022-01-28 NOTE — DISCHARGE INSTR - COC
Continuity of Care Form    Patient Name: Gentry Berry   :  1990  MRN:  [id number]    Admit date:  2022  Discharge date:  22    Code Status Order: Full Code   Advance Directives:      Admitting Physician:  Geo Oreilly DO  PCP: Lavelle Casanova MD    Discharging Nurse: [facility] Unit/Room#: 6037/4588-U  Discharging Unit Phone Number: [phone]    Emergency Contact:   Extended Emergency Contact Information  Primary Emergency Contact: [address] Phone: [phone]  Mobile Phone: [phone]  Relation: Parent   needed?  No    Past Surgical History:  Past Surgical History:   Procedure Laterality Date    BACK SURGERY      abscess    CATHETER REMOVAL N/A 10/1/2021    PERITONEAL CATHETER REMOVAL performed by Alfredo Pérez MD at [facility]      x2    CHOLECYSTECTOMY, LAPAROSCOPIC N/A 2019    CHOLECYSTECTOMY LAPAROSCOPIC performed by India Grijalva MD at [address] [facility] 2018    COLONOSCOPY WITH BIOPSY performed by Gen Panda MD at [facility] N/A 2018    COLONOSCOPY WITH BIOPSY performed by Starr Saeed MD at [address] [facility] [address]  2012    EF 57%    ECHO COMPL W DOP COLOR FLOW  6/10/2013         EMBOLECTOMY N/A 2020    [address], VEGECTOMY, YULISSA -- REQS ROOM 3 performed by Trevor Jaramillo MD at [address] [place] [address] Left 2021    LEFT LEG INCISION AND DRAINAGE, DEBRIDEMENT, WOUND VAC APPLICATION performed by Prieto Kim DPM at [address] [place] [address] Left 2021    LEFT LEG DEBRIDEMENT BIOPSY POSS APPLICATION WOUND VAC performed by Prieto Kim DPM at [address] [facility] N/A 2020    LAPAROSCOPIC INSERTION PERITONEAL DIALYSIS CATHETER performed by Jaycee Stewart MD at [facility] ESOPHAGOGASTRODUODENOSCOPY TRANSORAL DIAGNOSTIC N/A 2018    EGD ESOPHAGOGASTRODUODENOSCOPY performed by Chante Cortes Mckayla Bejarano MD at [place] [address]    TRANSESOPHAGEAL ECHOCARDIOGRAM N/A 10/19/2020    TRANSESOPHAGEAL ECHOCARDIOGRAM WITH BUBBLE STUDY performed by Jean Marie Ratliff MD at [place] [address]    TUNNELED VENOUS PORT PLACEMENT  04/2018    UPPER GASTROINTESTINAL ENDOSCOPY  12/18/2018    EGD BIOPSY performed by Otilia Cardona MD at [facility] N/A 10/11/2019    EGD ESOPHAGOGASTRODUODENOSCOPY performed by Rosa Echeverria DO at [facility] N/A 7/14/2021    EGD BIOPSY performed by Marta Verma MD at [facility] ENDOSCOPY       Immunization History:   Immunization History   Administered Date(s) Administered    Influenza Virus Vaccine 10/22/2011, 10/23/2012    Influenza, Jestine Peeks, 6 mo and older, IM, PF (Flulaval, Fluarix) 12/15/2018    Influenza, Quadv, IM, PF (6 mo and older Fluzone, Flulaval, Fluarix, and 3 yrs and older Afluria) 03/16/2017, 09/29/2017    Tdap (Boostrix, Adacel) 07/19/2016, 08/26/2016, 06/24/2017       Active Problems:  Patient Active Problem List   Diagnosis Code    Non compliance w medication regimen Z91.14    Tobacco smoking complicating pregnancy S75.920    MTHFR mutation Z15.89    Generalized abdominal pain R10.84    Diabetic ketoacidosis without coma associated with type 1 diabetes mellitus (HCC) E10.10    Hypertension I10    Prolonged QT interval R94.31    Iron deficiency anemia D50.9    Sinus tachycardia R00.0    Bladder dysfunction N31.9    Hypokalemia E87.6    Severe protein-calorie malnutrition ([address] E43    Uncontrolled type 1 diabetes mellitus with hyperglycemia (HCC) E10.65    End stage renal disease (HCC) N18.6    Hypothyroidism E03.9    Hyperlipidemia E78.5    Acute cystitis N30.00    Nondisplaced fracture of neck of fifth metacarpal bone, left hand, initial encounter for closed fracture S62.367A    Chronic right-sided low back pain M54.50, G89.29    Endocarditis I38    Seizure ([address] R56.9    Hyperkalemia E87.5    Hypomagnesemia E83.42    Hypocalcemia E83.51    Intractable nausea and vomiting R11.2    Wound of left leg, sequela S81.802S    Syncope R55    Type 1 diabetes mellitus without complication ([facility]) O82.4    Hyperosmolality E87.0    Major depressive disorder F32.9    Lactic acid acidosis E87.2    Early satiety R68.81    Acute on chronic systolic CHF (congestive heart failure) ([facility]) I50.23    Atypical chest pain R07.89    Chronic renal failure syndrome N18.9    Septic shock ([facility]) A41.9, R65.21    ESRD (end stage renal disease) ([facility]) N18.6    Hyperosmolar hyperglycemic state (HHS) ([facility]) E11.00, E11.65    Hypertensive urgency I16.0    Nausea R11.0    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    ESRD (end stage renal disease) on dialysis ([facility]) N18.6, Z99.2    AMS (altered mental status) R41.82    Opioid overdose ([facility] T40.2X1A    Anemia D64.9    Type 1 diabetes mellitus with chronic kidney disease on chronic dialysis ([facility]) E10.22, N18.6, Z99.2    Elevated TSH R79.89    Diabetic acidosis without coma ([facility]) E11.10    DKA, type 1, not at goal [facility]) E10.10    COVID-19 virus infection U07.1    Frequent hospital admissions Z78.9    Diabetic gastroparesis ([facility]) E11.43, K31.84    Diffuse pain R52    Poor prognosis Z78.9    Physical deconditioning R53.81    Declining functional status R53.81    Lack of motivation Z91.89    Drug-seeking behavior Z76.5    Behavior problem, adult F69       Isolation/Infection:   Isolation            No Isolation          Patient Infection Status       Infection Onset Added Last Indicated Last Indicated By Review Planned Expiration Resolved Resolved By    None active    Resolved    C-diff Rule Out 01/15/22 01/15/22 01/16/22 CLOSTRIDIUM DIFFICILE EIA (Ordered)   01/18/22 Ole Opitz, RN    Clostridium difficile Toxin Antigen (rapid) was cancelled on 01/16/2022 at   11:02 by [facility]; Cancelled: Test requires liquid stool only.     C-diff Rule Out 01/02/22 01/02/22 01/02/22 CLOSTRIDIUM DIFFICILE EIA (Ordered) 01/03/22 Dakota Nava RN    Order discontinued    COVID-19 12/31/21 12/31/21 12/31/21 COVID-19, Rapid   01/20/22 Dinora Martinez RN    Covid + 12/31/22-admitted to [facility]  Admitted 1/14/22-No supplemental oxygen, SaO2-95%, Afebrile, has not received any fever reducing medications in last 24 hours.  S[place]man, 1/20/22    COVID-19 (Rule Out) 12/31/21 12/31/21 12/31/21 COVID-19, Rapid (Ordered)   12/31/21 Rule-Out Test Resulted    COVID-19 (Rule Out) 12/07/21 12/07/21 12/07/21 COVID-19, Rapid (Ordered)   12/07/21 Rule-Out Test Resulted    COVID-19 (Rule Out) 11/22/21 11/22/21 11/22/21 COVID-19, Rapid (Ordered)   11/22/21 Rule-Out Test Resulted    COVID-19 (Rule Out) 11/11/21 11/11/21 11/12/21 COVID-19, Rapid (Ordered)   11/12/21 Rule-Out Test Resulted    VRE 10/01/21 10/04/21 10/01/21 Culture, Tip   11/15/21 Kylah Mock RN    Enterococcus faecium abdomen 10-1-21    COVID-19 (Rule Out) 09/28/21 09/28/21 09/28/21 Respiratory Panel, Molecular, with COVID-19 (Restricted: peds pts or suitable admitted adults) (Ordered)   09/28/21 Rule-Out Test Resulted    COVID-19 (Rule Out) 09/28/21 09/28/21 09/28/21 COVID-19, Rapid (Ordered)   09/28/21 Rule-Out Test Resulted    COVID-19 (Rule Out) 09/07/21 09/07/21 09/07/21 COVID-19, Rapid (Ordered)   09/08/21 Rule-Out Test Resulted    C-diff Rule Out 07/10/21 07/10/21 07/10/21 CLOSTRIDIUM DIFFICILE EIA (Ordered)   07/13/21 Dakota Nava RN    Order discontinued    COVID-19 (Rule Out) 07/10/21 07/10/21 07/10/21 Respiratory Panel, Molecular, with COVID-19 (Restricted: peds pts or suitable admitted adults) (Ordered)   07/10/21 Rule-Out Test Resulted    MDRO (multi-drug resistant organism)  07/02/21 07/02/21 Dakota Nava RN   09/09/21 Kylah Mock, RN    Enterococcus faecium, urine, 6-29-21    VRE 06/29/21 07/01/21 06/29/21 Culture, Urine   09/09/21 Kylah Mock RN    Enterococcus faecium, urine, 6-29-21     C-diff Rule Out 06/24/21 06/24/21 06/24/21 CLOSTRIDIUM DIFFICILE EIA (Ordered)   21 Fatimah Grigsby RN    Order discontinued    MRSA 21 Culture, Surgical   21 Esequiel Bolivar RN    Wound-Left Leg 21, 2021, 2021    COVID-19 (Rule Out) 21 COVID-19 (Ordered)   21     COVID-19 (Rule Out) 21 COVID-19, Rapid (Ordered)   21 Rule-Out Test Resulted    C-diff Rule Out 21 CLOSTRIDIUM DIFFICILE EIA (Ordered)   21 Fatimah Grigsby RN    Order discontinued by RN/physician.     COVID-19 (Rule Out) 02/15/21 02/15/21 02/15/21 COVID-19 (Ordered)   02/15/21 Rule-Out Test Resulted    COVID-19 (Rule Out) 20 Respiratory Panel, Molecular, with COVID-19 (Restricted: peds pts or suitable admitted adults) (Ordered)   20 Rule-Out Test Resulted    C-diff Rule Out 20 Clostridium difficile EIA (Ordered)   21 Esequiel Bolivar RN    C-diff Rule Out 10/31/20 10/31/20 10/31/20 C. difficile toxin Molecular (Ordered)   10/31/20 Rule-Out Test Resulted    C-diff Rule Out 10/31/20 10/31/20 10/31/20 CLOSTRIDIUM DIFFICILE EIA (Ordered)   10/31/20 Rule-Out Test Resulted    COVID-19 (Rule Out) 10/31/20 10/31/20 10/31/20 COVID-19 (Ordered)   10/31/20 Rule-Out Test Resulted    COVID-19 (Rule Out) 10/20/20 10/20/20 10/20/20 COVID-19 (Ordered)   10/20/20 Rule-Out Test Resulted    C-diff Rule Out 10/13/20 10/13/20 10/13/20 CLOSTRIDIUM DIFFICILE EIA (Ordered)   10/14/20 Rule-Out Test Resulted    COVID-19 (Rule Out) 20 COVID-19 (Ordered)   20     VRE 20 Culture, Urine   10/22/20 Liat Hernandez RN    Enterococcus faecium Urine 20  Cleared urine 10/8/20    C-diff Rule Out 20 C. difficile toxin Molecular (Ordered)   20 Rule-Out Test Resulted    COVID-19 (Rule Out) 20 Covid-19 Ambulatory (Ordered)   20 Rule-Out Test Resulted    MRSA 02/11/20 02/13/20 02/11/20 BODY FLUID CULTURE   02/13/20 Zeno Buerger, RN    MRSA 02/08/20 02/10/20 02/08/20 MRSA SCREENING CULTURE ONLY   02/11/20 Zeno Buerger, RN    ESBL (Extended Spectrum Beta Lactamase)  02/02/16 02/02/16 Zeno Buerger, RN   07/11/16 Blake Reyes RN    ESBL+ E Coli urine 1/25/16            Nurse Assessment:  Last Vital Signs: BP (!) 142/107   Pulse 103   Temp 97.5 °F (36.4 °C) (Temporal)   Resp 18   Ht 5' 4\" (1.626 m)   Wt 141 lb 1.5 oz (64 kg)   SpO2 100%   BMI 24.22 kg/m²     Last documented pain score (0-10 scale): Pain Level: 8  Last Weight:   Wt Readings from Last 1 Encounters:   01/28/22 141 lb 1.5 oz (64 kg)     Mental Status:  oriented and alert    IV Access:  - None    Nursing Mobility/ADLs:  Walking   Assisted  Transfer  Assisted  Bathing  Assisted  Dressing  Assisted  Toileting  Assisted  Feeding  Independent  Med Admin  Independent  Med Delivery   none    Wound Care Documentation and Therapy:        Elimination:  Continence: Bowel: No  Bladder: anuric  Urinary Catheter: None   Colostomy/Ileostomy/Ileal Conduit: No       Date of Last BM: 1/28/22    Intake/Output Summary (Last 24 hours) at 1/28/2022 1410  Last data filed at 1/28/2022 1008  Gross per 24 hour   Intake 420 ml   Output 2300 ml   Net -1880 ml     I/O last 3 completed shifts: In: 120 [P.O.:120]  Out: -     Safety Concerns: At Risk for Falls    Impairments/Disabilities:      None    Nutrition Therapy:  Current Nutrition Therapy:   - Oral Diet:  Carb Control 4 carbs/meal (1800kcals/day), Low Fat, Low Fiber, and gastroparesis diet    Routes of Feeding: Oral  Liquids: No Restrictions  Daily Fluid Restriction: yes - amount 1000ml  Last Modified Barium Swallow with Video (Video Swallowing Test): not done    Treatments at the Time of Hospital Discharge:   Respiratory Treatments: N/A  Oxygen Therapy:  is not on home oxygen therapy.   Ventilator:    - No ventilator support    Rehab Therapies: Physical Therapy and Occupational Therapy  Weight Bearing Status/Restrictions: No weight bearing restirctions  Other Medical Equipment (for information only, NOT a DME order):  walker and hospital bed  Other Treatments: ***    Patient's personal belongings (please select all that are sent with patient):  Clothing, purse, cell phone, cell phone     RN SIGNATURE:  Electronically signed by Morenita Cronin RN on 1/28/22 at 4:22 PM EST    CASE MANAGEMENT/SOCIAL WORK SECTION    Inpatient Status Date: ***    Readmission Risk Assessment Score:  Readmission Risk              Risk of Unplanned Readmission:  76           Discharging to Facility/ Agency   Name:   Russell Mckeon  Address:  [address] [place] [address] [place] [facility], [address]  Phone:  [phone]  Fax:   [phone]    Dialysis Facility (if applicable)   Name:  Virgie Hess  Address:   [address] [place] [address], [address] [place] [address]  Dialysis Schedule:  T-T-S  7 am chair time. She will need to be there at 6:30am  2/1/22 to sign paperwork  Phone:   [phone]  Fax:    / signature: Electronically signed by Trung Sam RN on 1/28/22 at 2:12 PM EST    PHYSICIAN SECTION    Prognosis: Fair    Condition at Discharge: Stable    Rehab Potential (if transferring to Rehab): Good    Recommended Labs or Other Treatments After Discharge: CBC q2 weeks and transfuse with HD as needed to keep Hb >7    Physician Certification: I certify the above information and transfer of [facility]  is necessary for the continuing treatment of the diagnosis listed and that she requires [facility] for less 30 days.      Update Admission H&P: No change in H&P    PHYSICIAN SIGNATURE:  Electronically signed by Ivan Heath MD on 1/28/22 at 2:16 PM EST

## 2022-01-28 NOTE — PROGRESS NOTES
Department of Internal Medicine  Nephrology Progress Note    Events Reviewed. For dialysis tomorrow    SUBJECTIVE:  We are following Ms. Khadijah Buckner for ESRD on HD. Reports being abdominal pain.     PHYSICAL EXAM:      Vitals:    VITALS:  BP (!) 142/107   Pulse 103   Temp 97.5 °F (36.4 °C) (Temporal)   Resp 18   Ht 5' 4\" (1.626 m)   Wt 141 lb 1.5 oz (64 kg)   SpO2 100%   BMI 24.22 kg/m²   24HR INTAKE/OUTPUT:      Intake/Output Summary (Last 24 hours) at 1/28/2022 1406  Last data filed at 1/28/2022 1008  Gross per 24 hour   Intake 420 ml   Output 2300 ml   Net -1880 ml       Access: RIJ tunneled dialysis catheter  Constitutional:  Lethargic, but awakens to voice  HEENT:  PERRL, normocephalic, atraumatic  Respiratory:  CTA bilaterally  Cardiovascular/Edema:  ST, RRR, no murmur gallop or rub  Gastrointestinal:  abd distended, c/o persistent abdominal pain  Neurologic: Alert and oriented, no focal deficit  Skin:  Warm, dry, ecchymotic areas to abdomen    Scheduled Meds:   escitalopram  5 mg Oral Daily    insulin lispro  0-3 Units SubCUTAneous 4x Daily AC & HS    dilTIAZem  90 mg Oral 4 times per day    pregabalin  50 mg Oral BID    insulin glargine-yfgn  1 Units SubCUTAneous QAM    lidocaine  5 mL IntraDERmal Once    sodium chloride flush  5-40 mL IntraVENous 2 times per day    heparin flush  1 mL IntraVENous 2 times per day    vitamin D  50,000 Units Oral Q14 Days    epoetin nena-epbx  2,000 Units SubCUTAneous Once per day on Mon Wed Fri    And    epoetin nena-epbx  3,000 Units SubCUTAneous Once per day on Mon Wed Fri    metoclopramide  5 mg Oral 4x Daily AC & HS    cholestyramine  1 packet Oral BID    ARIPiprazole  5 mg Oral Nightly    levothyroxine  75 mcg Oral Daily    mirtazapine  15 mg Oral Nightly    pantoprazole  40 mg Oral BID AC    rOPINIRole  0.25 mg Oral Nightly    heparin (porcine)  5,000 Units SubCUTAneous Q8H    influenza virus vaccine  0.5 mL IntraMUSCular Prior to discharge     Continuous Infusions:   sodium chloride      dextrose Stopped (01/23/22 0915)     PRN Meds:.labetalol, hydrOXYzine, acetaminophen, sodium chloride flush, sodium chloride, heparin flush, glucose, dextrose, glucagon (rDNA), dextrose, dextrose, polyethylene glycol    DATA:    CBC with Differential:    Lab Results   Component Value Date    WBC 6.2 01/28/2022    RBC 3.21 01/28/2022    HGB 8.4 01/28/2022    HCT 25.9 01/28/2022    PLT 74 01/28/2022    MCV 80.7 01/28/2022    MCH 26.2 01/28/2022    MCHC 32.4 01/28/2022    RDW 20.9 01/28/2022    NRBC 0.9 08/10/2020    SEGSPCT 67 06/27/2013    METASPCT 1.7 08/10/2020    LYMPHOPCT 24.9 01/26/2022    MONOPCT 4.5 01/26/2022    MYELOPCT 0.9 01/19/2022    BASOPCT 1.0 01/26/2022    MONOSABS 0.22 01/26/2022    LYMPHSABS 1.21 01/26/2022    EOSABS 0.03 01/26/2022    BASOSABS 0.05 01/26/2022     CMP:    Lab Results   Component Value Date     01/28/2022    K 4.7 01/28/2022    K 4.2 11/24/2021     01/28/2022    CO2 24 01/28/2022    BUN 14 01/28/2022    CREATININE 3.5 01/28/2022    GFRAA 19 01/28/2022    LABGLOM 19 01/28/2022    GLUCOSE 94 01/28/2022    GLUCOSE 130 05/18/2012    PROT 5.4 01/22/2022    LABALBU 2.5 01/22/2022    LABALBU 4.1 05/18/2012    CALCIUM 8.2 01/28/2022    BILITOT 0.3 01/22/2022    ALKPHOS 177 01/22/2022    AST 18 01/22/2022    ALT 9 01/22/2022     Magnesium:    Lab Results   Component Value Date    MG 1.6 01/25/2022     Phosphorus:    Lab Results   Component Value Date    PHOS 2.4 01/23/2022     Radiology Review:                Chest Xray 1/14/22   Suspect early bibasilar infiltrates. BRIEF SUMMARY OF INITIAL CONSULT:     Briefly, Miss Donta Acosta is a 36year-old female with a PMH of ESRD on home hemodialysis 5 days/wk, (initiated September 2021, Type I DM, poorly controlled with recurrent admissions for DKA, HTN, gastroparesis, ascites, infective endocarditis, cardiac arrest, hypothyroidism and depression.  She was recently ergocalciferol 50,000 units every 2 weeks  · Obtain ionized calcium  · Plan for discharge possible subacute rehab with dialysis TTS at [address]      Electronically signed by HEBER Simms CNP on 1/28/2022 at 2:06 PM  Await pain management consult

## 2022-02-01 ENCOUNTER — APPOINTMENT (OUTPATIENT)
Dept: GENERAL RADIOLOGY | Age: 32
DRG: 194 | End: 2022-02-01
Payer: COMMERCIAL

## 2022-02-01 ENCOUNTER — HOSPITAL ENCOUNTER (INPATIENT)
Age: 32
LOS: 3 days | Discharge: OTHER INSTITUTION | DRG: 194 | End: 2022-02-04
Attending: EMERGENCY MEDICINE | Admitting: INTERNAL MEDICINE
Payer: COMMERCIAL

## 2022-02-01 DIAGNOSIS — E16.2 HYPOGLYCEMIA: ICD-10-CM

## 2022-02-01 DIAGNOSIS — E87.70 HYPERVOLEMIA, UNSPECIFIED HYPERVOLEMIA TYPE: ICD-10-CM

## 2022-02-01 DIAGNOSIS — E10.65 UNCONTROLLED TYPE 1 DIABETES MELLITUS WITH HYPERGLYCEMIA (HCC): ICD-10-CM

## 2022-02-01 DIAGNOSIS — E87.20 METABOLIC ACIDOSIS: Primary | ICD-10-CM

## 2022-02-01 LAB
ALBUMIN SERPL-MCNC: 2.7 G/DL (ref 3.5–5.2)
ALP BLD-CCNC: 241 U/L (ref 35–104)
ALT SERPL-CCNC: 11 U/L (ref 0–32)
ANION GAP SERPL CALCULATED.3IONS-SCNC: 11 MMOL/L (ref 7–16)
ANISOCYTOSIS: ABNORMAL
AST SERPL-CCNC: 15 U/L (ref 0–31)
BASOPHILS ABSOLUTE: 0.19 E9/L (ref 0–0.2)
BASOPHILS RELATIVE PERCENT: 1.8 % (ref 0–2)
BETA-HYDROXYBUTYRATE: 0.09 MMOL/L (ref 0.02–0.27)
BILIRUB SERPL-MCNC: 0.4 MG/DL (ref 0–1.2)
BILIRUBIN DIRECT: <0.2 MG/DL (ref 0–0.3)
BILIRUBIN, INDIRECT: ABNORMAL MG/DL (ref 0–1)
BUN BLDV-MCNC: 22 MG/DL (ref 6–20)
BURR CELLS: ABNORMAL
CALCIUM SERPL-MCNC: 8.4 MG/DL (ref 8.6–10.2)
CHLORIDE BLD-SCNC: 107 MMOL/L (ref 98–107)
CO2: 22 MMOL/L (ref 22–29)
CREAT SERPL-MCNC: 4.1 MG/DL (ref 0.5–1)
EKG ATRIAL RATE: 87 BPM
EKG P AXIS: 29 DEGREES
EKG P-R INTERVAL: 126 MS
EKG Q-T INTERVAL: 390 MS
EKG QRS DURATION: 82 MS
EKG QTC CALCULATION (BAZETT): 469 MS
EKG R AXIS: 44 DEGREES
EKG T AXIS: 48 DEGREES
EKG VENTRICULAR RATE: 87 BPM
EOSINOPHILS ABSOLUTE: 0.09 E9/L (ref 0.05–0.5)
EOSINOPHILS RELATIVE PERCENT: 0.9 % (ref 0–6)
GFR AFRICAN AMERICAN: 15
GFR NON-AFRICAN AMERICAN: 15 ML/MIN/1.73
GLUCOSE BLD-MCNC: 63 MG/DL (ref 74–99)
HCG QUALITATIVE: NEGATIVE
HCT VFR BLD CALC: 27.3 % (ref 34–48)
HEMOGLOBIN: 8.7 G/DL (ref 11.5–15.5)
LACTIC ACID: 2.1 MMOL/L (ref 0.5–2.2)
LIPASE: 8 U/L (ref 13–60)
LYMPHOCYTES ABSOLUTE: 2.06 E9/L (ref 1.5–4)
LYMPHOCYTES RELATIVE PERCENT: 20.4 % (ref 20–42)
MCH RBC QN AUTO: 26.4 PG (ref 26–35)
MCHC RBC AUTO-ENTMCNC: 31.9 % (ref 32–34.5)
MCV RBC AUTO: 83 FL (ref 80–99.9)
METER GLUCOSE: 132 MG/DL (ref 74–99)
METER GLUCOSE: 266 MG/DL (ref 74–99)
METER GLUCOSE: 276 MG/DL (ref 74–99)
METER GLUCOSE: 41 MG/DL (ref 74–99)
MONOCYTES ABSOLUTE: 0.21 E9/L (ref 0.1–0.95)
MONOCYTES RELATIVE PERCENT: 1.8 % (ref 2–12)
NEUTROPHILS ABSOLUTE: 7.73 E9/L (ref 1.8–7.3)
NEUTROPHILS RELATIVE PERCENT: 75.2 % (ref 43–80)
PDW BLD-RTO: 22.7 FL (ref 11.5–15)
PH VENOUS: 7.25 (ref 7.35–7.45)
PLATELET # BLD: 104 E9/L (ref 130–450)
PMV BLD AUTO: ABNORMAL FL (ref 7–12)
POIKILOCYTES: ABNORMAL
POLYCHROMASIA: ABNORMAL
POTASSIUM REFLEX MAGNESIUM: 5.1 MMOL/L (ref 3.5–5)
PRO-BNP: ABNORMAL PG/ML (ref 0–125)
RBC # BLD: 3.29 E12/L (ref 3.5–5.5)
SODIUM BLD-SCNC: 140 MMOL/L (ref 132–146)
TARGET CELLS: ABNORMAL
TOTAL PROTEIN: 6.2 G/DL (ref 6.4–8.3)
TROPONIN, HIGH SENSITIVITY: 132 NG/L (ref 0–9)
TROPONIN, HIGH SENSITIVITY: 146 NG/L (ref 0–9)
VITAMIN D 25-HYDROXY: 10 NG/ML (ref 30–100)
WBC # BLD: 10.3 E9/L (ref 4.5–11.5)

## 2022-02-01 PROCEDURE — 99222 1ST HOSP IP/OBS MODERATE 55: CPT | Performed by: INTERNAL MEDICINE

## 2022-02-01 PROCEDURE — G0378 HOSPITAL OBSERVATION PER HR: HCPCS

## 2022-02-01 PROCEDURE — 82306 VITAMIN D 25 HYDROXY: CPT

## 2022-02-01 PROCEDURE — 6360000002 HC RX W HCPCS: Performed by: STUDENT IN AN ORGANIZED HEALTH CARE EDUCATION/TRAINING PROGRAM

## 2022-02-01 PROCEDURE — 1200000000 HC SEMI PRIVATE

## 2022-02-01 PROCEDURE — 83605 ASSAY OF LACTIC ACID: CPT

## 2022-02-01 PROCEDURE — 85025 COMPLETE CBC W/AUTO DIFF WBC: CPT

## 2022-02-01 PROCEDURE — 96374 THER/PROPH/DIAG INJ IV PUSH: CPT

## 2022-02-01 PROCEDURE — 5A1D70Z PERFORMANCE OF URINARY FILTRATION, INTERMITTENT, LESS THAN 6 HOURS PER DAY: ICD-10-PCS | Performed by: INTERNAL MEDICINE

## 2022-02-01 PROCEDURE — 96372 THER/PROPH/DIAG INJ SC/IM: CPT

## 2022-02-01 PROCEDURE — 71045 X-RAY EXAM CHEST 1 VIEW: CPT

## 2022-02-01 PROCEDURE — 84703 CHORIONIC GONADOTROPIN ASSAY: CPT

## 2022-02-01 PROCEDURE — 84484 ASSAY OF TROPONIN QUANT: CPT

## 2022-02-01 PROCEDURE — 2580000003 HC RX 258: Performed by: INTERNAL MEDICINE

## 2022-02-01 PROCEDURE — 90935 HEMODIALYSIS ONE EVALUATION: CPT | Performed by: INTERNAL MEDICINE

## 2022-02-01 PROCEDURE — 36415 COLL VENOUS BLD VENIPUNCTURE: CPT

## 2022-02-01 PROCEDURE — 82800 BLOOD PH: CPT

## 2022-02-01 PROCEDURE — 6370000000 HC RX 637 (ALT 250 FOR IP): Performed by: INTERNAL MEDICINE

## 2022-02-01 PROCEDURE — 93005 ELECTROCARDIOGRAM TRACING: CPT | Performed by: STUDENT IN AN ORGANIZED HEALTH CARE EDUCATION/TRAINING PROGRAM

## 2022-02-01 PROCEDURE — 83690 ASSAY OF LIPASE: CPT

## 2022-02-01 PROCEDURE — 93010 ELECTROCARDIOGRAM REPORT: CPT | Performed by: INTERNAL MEDICINE

## 2022-02-01 PROCEDURE — 82962 GLUCOSE BLOOD TEST: CPT

## 2022-02-01 PROCEDURE — 83880 ASSAY OF NATRIURETIC PEPTIDE: CPT

## 2022-02-01 PROCEDURE — 99285 EMERGENCY DEPT VISIT HI MDM: CPT

## 2022-02-01 PROCEDURE — 6360000002 HC RX W HCPCS: Performed by: INTERNAL MEDICINE

## 2022-02-01 PROCEDURE — 80048 BASIC METABOLIC PNL TOTAL CA: CPT

## 2022-02-01 PROCEDURE — 80076 HEPATIC FUNCTION PANEL: CPT

## 2022-02-01 PROCEDURE — 82010 KETONE BODYS QUAN: CPT

## 2022-02-01 RX ORDER — ROPINIROLE 0.25 MG/1
0.25 TABLET, FILM COATED ORAL NIGHTLY
Status: DISCONTINUED | OUTPATIENT
Start: 2022-02-01 | End: 2022-02-04 | Stop reason: HOSPADM

## 2022-02-01 RX ORDER — PANTOPRAZOLE SODIUM 40 MG/1
40 TABLET, DELAYED RELEASE ORAL 2 TIMES DAILY
Status: DISCONTINUED | OUTPATIENT
Start: 2022-02-01 | End: 2022-02-04 | Stop reason: HOSPADM

## 2022-02-01 RX ORDER — MIRTAZAPINE 15 MG/1
15 TABLET, FILM COATED ORAL NIGHTLY
Status: DISCONTINUED | OUTPATIENT
Start: 2022-02-01 | End: 2022-02-04 | Stop reason: HOSPADM

## 2022-02-01 RX ORDER — ACETAMINOPHEN 325 MG/1
650 TABLET ORAL EVERY 6 HOURS PRN
Status: DISCONTINUED | OUTPATIENT
Start: 2022-02-01 | End: 2022-02-04 | Stop reason: HOSPADM

## 2022-02-01 RX ORDER — HEPARIN SODIUM 5000 [USP'U]/ML
5000 INJECTION, SOLUTION INTRAVENOUS; SUBCUTANEOUS EVERY 8 HOURS SCHEDULED
Status: DISCONTINUED | OUTPATIENT
Start: 2022-02-01 | End: 2022-02-04 | Stop reason: HOSPADM

## 2022-02-01 RX ORDER — SEVELAMER CARBONATE 800 MG/1
800 TABLET, FILM COATED ORAL
Status: DISCONTINUED | OUTPATIENT
Start: 2022-02-01 | End: 2022-02-04 | Stop reason: HOSPADM

## 2022-02-01 RX ORDER — ONDANSETRON 2 MG/ML
4 INJECTION INTRAMUSCULAR; INTRAVENOUS EVERY 6 HOURS PRN
Status: DISCONTINUED | OUTPATIENT
Start: 2022-02-01 | End: 2022-02-04 | Stop reason: HOSPADM

## 2022-02-01 RX ORDER — ARIPIPRAZOLE 5 MG/1
5 TABLET ORAL NIGHTLY
Status: DISCONTINUED | OUTPATIENT
Start: 2022-02-01 | End: 2022-02-04 | Stop reason: HOSPADM

## 2022-02-01 RX ORDER — ALBUTEROL SULFATE 2.5 MG/3ML
2.5 SOLUTION RESPIRATORY (INHALATION) EVERY 6 HOURS PRN
Status: DISCONTINUED | OUTPATIENT
Start: 2022-02-01 | End: 2022-02-04 | Stop reason: HOSPADM

## 2022-02-01 RX ORDER — ACETAMINOPHEN 650 MG/1
650 SUPPOSITORY RECTAL EVERY 6 HOURS PRN
Status: DISCONTINUED | OUTPATIENT
Start: 2022-02-01 | End: 2022-02-04 | Stop reason: HOSPADM

## 2022-02-01 RX ORDER — DEXTROSE MONOHYDRATE 50 MG/ML
100 INJECTION, SOLUTION INTRAVENOUS PRN
Status: DISCONTINUED | OUTPATIENT
Start: 2022-02-01 | End: 2022-02-04 | Stop reason: HOSPADM

## 2022-02-01 RX ORDER — HYDROXYZINE PAMOATE 25 MG/1
25 CAPSULE ORAL 3 TIMES DAILY PRN
Status: DISCONTINUED | OUTPATIENT
Start: 2022-02-01 | End: 2022-02-04 | Stop reason: HOSPADM

## 2022-02-01 RX ORDER — PREGABALIN 50 MG/1
50 CAPSULE ORAL 2 TIMES DAILY
Status: DISCONTINUED | OUTPATIENT
Start: 2022-02-01 | End: 2022-02-03

## 2022-02-01 RX ORDER — DEXTROSE MONOHYDRATE 25 G/50ML
12.5 INJECTION, SOLUTION INTRAVENOUS PRN
Status: DISCONTINUED | OUTPATIENT
Start: 2022-02-01 | End: 2022-02-04 | Stop reason: HOSPADM

## 2022-02-01 RX ORDER — LEVOTHYROXINE SODIUM 0.07 MG/1
75 TABLET ORAL DAILY
Status: DISCONTINUED | OUTPATIENT
Start: 2022-02-01 | End: 2022-02-04 | Stop reason: HOSPADM

## 2022-02-01 RX ORDER — FENTANYL CITRATE 50 UG/ML
50 INJECTION, SOLUTION INTRAMUSCULAR; INTRAVENOUS ONCE
Status: COMPLETED | OUTPATIENT
Start: 2022-02-01 | End: 2022-02-01

## 2022-02-01 RX ORDER — SODIUM CHLORIDE 9 MG/ML
25 INJECTION, SOLUTION INTRAVENOUS PRN
Status: DISCONTINUED | OUTPATIENT
Start: 2022-02-01 | End: 2022-02-04 | Stop reason: HOSPADM

## 2022-02-01 RX ORDER — NICOTINE POLACRILEX 4 MG
15 LOZENGE BUCCAL PRN
Status: DISCONTINUED | OUTPATIENT
Start: 2022-02-01 | End: 2022-02-04 | Stop reason: HOSPADM

## 2022-02-01 RX ORDER — ONDANSETRON 4 MG/1
4 TABLET, ORALLY DISINTEGRATING ORAL EVERY 8 HOURS PRN
Status: DISCONTINUED | OUTPATIENT
Start: 2022-02-01 | End: 2022-02-04 | Stop reason: HOSPADM

## 2022-02-01 RX ORDER — ERGOCALCIFEROL 1.25 MG/1
50000 CAPSULE ORAL WEEKLY
Status: DISCONTINUED | OUTPATIENT
Start: 2022-02-02 | End: 2022-02-04 | Stop reason: HOSPADM

## 2022-02-01 RX ORDER — POLYETHYLENE GLYCOL 3350 17 G/17G
17 POWDER, FOR SOLUTION ORAL DAILY PRN
Status: DISCONTINUED | OUTPATIENT
Start: 2022-02-01 | End: 2022-02-04 | Stop reason: HOSPADM

## 2022-02-01 RX ORDER — SODIUM CHLORIDE 0.9 % (FLUSH) 0.9 %
5-40 SYRINGE (ML) INJECTION PRN
Status: DISCONTINUED | OUTPATIENT
Start: 2022-02-01 | End: 2022-02-04 | Stop reason: HOSPADM

## 2022-02-01 RX ORDER — ALBUTEROL SULFATE 90 UG/1
1 AEROSOL, METERED RESPIRATORY (INHALATION) EVERY 6 HOURS PRN
Status: DISCONTINUED | OUTPATIENT
Start: 2022-02-01 | End: 2022-02-01 | Stop reason: CLARIF

## 2022-02-01 RX ORDER — CHOLESTYRAMINE 4 G/9G
1 POWDER, FOR SUSPENSION ORAL 2 TIMES DAILY
Status: DISCONTINUED | OUTPATIENT
Start: 2022-02-01 | End: 2022-02-04 | Stop reason: HOSPADM

## 2022-02-01 RX ORDER — ESCITALOPRAM OXALATE 10 MG/1
5 TABLET ORAL DAILY
Status: DISCONTINUED | OUTPATIENT
Start: 2022-02-01 | End: 2022-02-04 | Stop reason: HOSPADM

## 2022-02-01 RX ORDER — INSULIN GLARGINE 100 [IU]/ML
2 INJECTION, SOLUTION SUBCUTANEOUS EVERY MORNING
Status: DISCONTINUED | OUTPATIENT
Start: 2022-02-02 | End: 2022-02-04 | Stop reason: HOSPADM

## 2022-02-01 RX ORDER — SODIUM CHLORIDE 0.9 % (FLUSH) 0.9 %
5-40 SYRINGE (ML) INJECTION EVERY 12 HOURS SCHEDULED
Status: DISCONTINUED | OUTPATIENT
Start: 2022-02-01 | End: 2022-02-04 | Stop reason: HOSPADM

## 2022-02-01 RX ADMIN — PREGABALIN 50 MG: 50 CAPSULE ORAL at 22:18

## 2022-02-01 RX ADMIN — INSULIN LISPRO 1 UNITS: 100 INJECTION, SOLUTION INTRAVENOUS; SUBCUTANEOUS at 22:19

## 2022-02-01 RX ADMIN — Medication 10 ML: at 22:40

## 2022-02-01 RX ADMIN — MIRTAZAPINE 15 MG: 15 TABLET, FILM COATED ORAL at 22:17

## 2022-02-01 RX ADMIN — PANTOPRAZOLE SODIUM 40 MG: 40 TABLET, DELAYED RELEASE ORAL at 22:18

## 2022-02-01 RX ADMIN — FENTANYL CITRATE 50 MCG: 50 INJECTION, SOLUTION INTRAMUSCULAR; INTRAVENOUS at 11:45

## 2022-02-01 RX ADMIN — HEPARIN SODIUM 5000 UNITS: 5000 INJECTION INTRAVENOUS; SUBCUTANEOUS at 22:19

## 2022-02-01 RX ADMIN — ROPINIROLE HYDROCHLORIDE 0.25 MG: 0.25 TABLET, FILM COATED ORAL at 22:18

## 2022-02-01 ASSESSMENT — PAIN DESCRIPTION - LOCATION: LOCATION: GENERALIZED

## 2022-02-01 ASSESSMENT — ENCOUNTER SYMPTOMS
RHINORRHEA: 0
SINUS PRESSURE: 0
CONSTIPATION: 0
EYE REDNESS: 0
CHEST TIGHTNESS: 0
EYE PAIN: 0
COUGH: 0
VOMITING: 0
BACK PAIN: 0
SINUS PAIN: 0
NAUSEA: 0
ABDOMINAL PAIN: 1
SHORTNESS OF BREATH: 1
DIARRHEA: 1

## 2022-02-01 ASSESSMENT — PAIN DESCRIPTION - PAIN TYPE: TYPE: ACUTE PAIN

## 2022-02-01 ASSESSMENT — PAIN SCALES - GENERAL: PAINLEVEL_OUTOF10: 7

## 2022-02-01 NOTE — DISCHARGE INSTR - COC
Continuity of Care Form    Patient Name: Santana Hampton   :  1990  MRN:  [id number]    Admit date:  2022  Discharge date:  2/3/22    Code Status Order: Full Code   Advance Directives:      Admitting Physician:  Julio Catalan DO  PCP: Renita Cm MD    Discharging Nurse: [facility] Unit/Room#: 2885/2968-92  Discharging Unit Phone Number: [phone]    Emergency Contact:   Extended Emergency Contact Information  Primary Emergency Contact: [address] Phone: [phone]  Mobile Phone: [phone]  Relation: Parent   needed?  No    Past Surgical History:  Past Surgical History:   Procedure Laterality Date    BACK SURGERY      abscess    CATHETER REMOVAL N/A 10/1/2021    PERITONEAL CATHETER REMOVAL performed by eJwels Trinh MD at [place] [address]    CHOLECYSTECTOMY, LAPAROSCOPIC N/A 2019    CHOLECYSTECTOMY LAPAROSCOPIC performed by Eric Haider MD at [address] [place] [address] 2018    COLONOSCOPY WITH BIOPSY performed by Gurwinder Olivas MD at [address] [place] [address] N/A 2018    COLONOSCOPY WITH BIOPSY performed by Shaina Fletcher MD at [address]  2012    EF 57%    ECHO COMPL W DOP COLOR FLOW  6/10/2013         EMBOLECTOMY N/A 2020    [address], VEGECTOMY, YULISSA -- REQS ROOM 3 performed by Torey Puente MD at [address] [place] [address] Left 2021    LEFT LEG INCISION AND DRAINAGE, DEBRIDEMENT, WOUND VAC APPLICATION performed by Marianne Marquez DPM at [facility] Left 2021    LEFT LEG DEBRIDEMENT BIOPSY POSS APPLICATION WOUND VAC performed by Marianne Marquez DPM at [address] N/A 2020    LAPAROSCOPIC INSERTION PERITONEAL DIALYSIS CATHETER performed by Mukesh Brady MD at [facility]  ESOPHAGOGASTRODUODENOSCOPY TRANSORAL DIAGNOSTIC N/A 2018    EGD ESOPHAGOGASTRODUODENOSCOPY performed by Erum Trimble Cruz Small MD at [place] [address]    TRANSESOPHAGEAL ECHOCARDIOGRAM N/A 10/19/2020    TRANSESOPHAGEAL ECHOCARDIOGRAM WITH BUBBLE STUDY performed by Belen Cortes MD at [place] [address]    TUNNELED VENOUS PORT PLACEMENT  04/2018    UPPER GASTROINTESTINAL ENDOSCOPY  12/18/2018    EGD BIOPSY performed by Autumn Smith MD at [facility] N/A 10/11/2019    EGD ESOPHAGOGASTRODUODENOSCOPY performed by Ciro Shone, DO at [facility] N/A 7/14/2021    EGD BIOPSY performed by Davi Soriano MD at [facility] ENDOSCOPY       Immunization History:   Immunization History   Administered Date(s) Administered    Influenza Virus Vaccine 10/22/2011, 10/23/2012    Influenza, Brandon Hum, 6 mo and older, IM, PF (Flulaval, Fluarix) 12/15/2018    Influenza, Quadv, IM, PF (6 mo and older Fluzone, Flulaval, Fluarix, and 3 yrs and older Afluria) 03/16/2017, 09/29/2017    Tdap (Boostrix, Adacel) 07/19/2016, 08/26/2016, 06/24/2017       Active Problems:  Patient Active Problem List   Diagnosis Code    Non compliance w medication regimen Z91.14    Tobacco smoking complicating pregnancy I83.309    MTHFR mutation Z15.89    Generalized abdominal pain R10.84    Diabetic ketoacidosis without coma associated with type 1 diabetes mellitus (HCC) E10.10    Hypertension I10    Prolonged QT interval R94.31    Iron deficiency anemia D50.9    Sinus tachycardia R00.0    Bladder dysfunction N31.9    Hypokalemia E87.6    Severe protein-calorie malnutrition ([address] E43    Uncontrolled type 1 diabetes mellitus with hyperglycemia (HCC) E10.65    End stage renal disease (HCC) N18.6    Hypothyroidism E03.9    Hyperlipidemia E78.5    Acute cystitis N30.00    Nondisplaced fracture of neck of fifth metacarpal bone, left hand, initial encounter for closed fracture S62.367A    Chronic right-sided low back pain M54.50, G89.29    Endocarditis I38    Seizure ([address] R56.9    Hyperkalemia E87.5    Hypomagnesemia E83.42    Hypocalcemia E83.51    Intractable nausea and vomiting R11.2    Wound of left leg, sequela S81.802S    Syncope R55    Type 1 diabetes mellitus without complication ([facility]) Y20.9    Hyperosmolality E87.0    Major depressive disorder F32.9    Lactic acid acidosis E87.2    Early satiety R68.81    Acute on chronic systolic CHF (congestive heart failure) ([facility]) I50.23    Other chest pain R07.89    Chronic renal failure syndrome N18.9    Septic shock ([facility]) A41.9, R65.21    ESRD (end stage renal disease) ([facility]) N18.6    Hyperosmolar hyperglycemic state (HHS) ([facility]) E11.00, E11.65    Hypertensive urgency I16.0    Nausea R11.0    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    ESRD (end stage renal disease) on dialysis ([facility] N18.6, Z99.2    AMS (altered mental status) R41.82    Opioid overdose ([facility] T40.2X1A    Anemia D64.9    Type 1 diabetes mellitus with chronic kidney disease on chronic dialysis ([facility]) E10.22, N18.6, Z99.2    Elevated TSH R79.89    Diabetic acidosis without coma ([facility]) E11.10    DKA, type 1, not at goal [facility]) E10.10    COVID-19 virus infection U07.1    Frequent hospital admissions Z78.9    Diabetic gastroparesis ([facility]) E11.43, K31.84    Diffuse pain R52    Poor prognosis Z78.9    Physical deconditioning R53.81    Declining functional status R53.81    Lack of motivation Z91.89    Drug-seeking behavior Z76.5    Behavior problem, adult F69    Volume overload E87.70       Isolation/Infection:   Isolation            C Diff Contact          Patient Infection Status       Infection Onset Added Last Indicated Last Indicated By Review Planned Expiration Resolved Resolved By    C-diff Rule Out 02/01/22 02/01/22 02/01/22 CLOSTRIDIUM DIFFICILE EIA (Ordered)        Resolved    C-diff Rule Out 01/15/22 01/15/22 01/16/22 CLOSTRIDIUM DIFFICILE EIA (Ordered)   01/18/22 Ole Opitz, RN    Clostridium difficile Toxin Antigen (rapid) was cancelled on 01/16/2022 at   11:02 by [facility]; Cancelled: Test requires liquid stool only. C-diff Rule Out 01/02/22 01/02/22 01/02/22 CLOSTRIDIUM DIFFICILE EIA (Ordered)   01/03/22 Faye Gant RN    Order discontinued    COVID-19 12/31/21 12/31/21 12/31/21 COVID-19, Rapid   01/20/22 Edison Stuart RN    Covid + 12/31/22-admitted to [facility]  Admitted 1/14/22-No supplemental oxygen, SaO2-95%, Afebrile, has not received any fever reducing medications in last 24 hours.  [place], 1/20/22    COVID-19 (Rule Out) 12/31/21 12/31/21 12/31/21 COVID-19, Rapid (Ordered)   12/31/21 Rule-Out Test Resulted    COVID-19 (Rule Out) 12/07/21 12/07/21 12/07/21 COVID-19, Rapid (Ordered)   12/07/21 Rule-Out Test Resulted    COVID-19 (Rule Out) 11/22/21 11/22/21 11/22/21 COVID-19, Rapid (Ordered)   11/22/21 Rule-Out Test Resulted    COVID-19 (Rule Out) 11/11/21 11/11/21 11/12/21 COVID-19, Rapid (Ordered)   11/12/21 Rule-Out Test Resulted    VRE 10/01/21 10/04/21 10/01/21 Culture, Tip   11/15/21 Sri Mcfarland RN    Enterococcus faecium abdomen 10-1-21    COVID-19 (Rule Out) 09/28/21 09/28/21 09/28/21 Respiratory Panel, Molecular, with COVID-19 (Restricted: peds pts or suitable admitted adults) (Ordered)   09/28/21 Rule-Out Test Resulted    COVID-19 (Rule Out) 09/28/21 09/28/21 09/28/21 COVID-19, Rapid (Ordered)   09/28/21 Rule-Out Test Resulted    COVID-19 (Rule Out) 09/07/21 09/07/21 09/07/21 COVID-19, Rapid (Ordered)   09/08/21 Rule-Out Test Resulted    C-diff Rule Out 07/10/21 07/10/21 07/10/21 CLOSTRIDIUM DIFFICILE EIA (Ordered)   07/13/21 Faye Gant RN    Order discontinued    COVID-19 (Rule Out) 07/10/21 07/10/21 07/10/21 Respiratory Panel, Molecular, with COVID-19 (Restricted: peds pts or suitable admitted adults) (Ordered)   07/10/21 Rule-Out Test Resulted    MDRO (multi-drug resistant organism)  07/02/21 07/02/21 Faye Gant RN   09/09/21 Sri Mcfarland RN    Enterococcus faecium, urine, 6-29-21    VRE 06/29/21 07/01/21 06/29/21 Culture, Urine   09/09/21 Sri Mcfarland RN    Enterococcus faecium, urine, 21     C-diff Rule Out 21 CLOSTRIDIUM DIFFICILE EIA (Ordered)   21 Salma Esparza RN    Order discontinued    MRSA 21 Culture, Surgical   21 Yeni Mercedes RN    Wound-Left Leg 21, 2021, 2021    COVID-19 (Rule Out) 21 COVID-19 (Ordered)   21     COVID-19 (Rule Out) 21 COVID-19, Rapid (Ordered)   21 Rule-Out Test Resulted    C-diff Rule Out 21 CLOSTRIDIUM DIFFICILE EIA (Ordered)   21 Salma Esparza RN    Order discontinued by RN/physician.     COVID-19 (Rule Out) 02/15/21 02/15/21 02/15/21 COVID-19 (Ordered)   02/15/21 Rule-Out Test Resulted    COVID-19 (Rule Out) 20 Respiratory Panel, Molecular, with COVID-19 (Restricted: peds pts or suitable admitted adults) (Ordered)   20 Rule-Out Test Resulted    C-diff Rule Out 20 Clostridium difficile EIA (Ordered)   21 Yeni Mercedes RN    C-diff Rule Out 10/31/20 10/31/20 10/31/20 C. difficile toxin Molecular (Ordered)   10/31/20 Rule-Out Test Resulted    C-diff Rule Out 10/31/20 10/31/20 10/31/20 CLOSTRIDIUM DIFFICILE EIA (Ordered)   10/31/20 Rule-Out Test Resulted    COVID-19 (Rule Out) 10/31/20 10/31/20 10/31/20 COVID-19 (Ordered)   10/31/20 Rule-Out Test Resulted    COVID-19 (Rule Out) 10/20/20 10/20/20 10/20/20 COVID-19 (Ordered)   10/20/20 Rule-Out Test Resulted    C-diff Rule Out 10/13/20 10/13/20 10/13/20 CLOSTRIDIUM DIFFICILE EIA (Ordered)   10/14/20 Rule-Out Test Resulted    COVID-19 (Rule Out) 20 COVID-19 (Ordered)   20     VRE 20 Culture, Urine   10/22/20 Beryle Socks, RN    Enterococcus faecium Urine 20  Cleared urine 10/8/20    C-diff Rule Out 20 C. difficile toxin Molecular (Ordered)   20 Rule-Out Test Resulted COVID-19 (Rule Out) 06/22/20 06/22/20 06/22/20 Covid-19 Ambulatory (Ordered)   06/24/20 Rule-Out Test Resulted    MRSA 02/11/20 02/13/20 02/11/20 BODY FLUID CULTURE   02/13/20 Bradley Leavitt RN    MRSA 02/08/20 02/10/20 02/08/20 MRSA SCREENING CULTURE ONLY   02/11/20 Bradley Leavitt RN    ESBL (Extended Spectrum Beta Lactamase)  02/02/16 02/02/16 Bradley Leavitt RN   07/11/16 Ada Harden RN    ESBL+ E Coli urine 1/25/16            Nurse Assessment:  Last Vital Signs: /87   Pulse 98   Temp 97.6 °F (36.4 °C) (Oral)   Resp 16   Ht 5' 4\" (1.626 m)   Wt 139 lb 11.2 oz (63.4 kg)   SpO2 96%   BMI 23.98 kg/m²     Last documented pain score (0-10 scale): Pain Level: 7  Last Weight:   Wt Readings from Last 1 Encounters:   02/01/22 139 lb 11.2 oz (63.4 kg)     Mental Status:  oriented and alert    IV Access:  - Dialysis Catheter  - site  right, insertion date: ***    Nursing Mobility/ADLs:  Walking   Assisted  Transfer  Assisted  Bathing  Assisted  Dressing  Assisted  Toileting  Assisted  Feeding  Independent  Med Admin  Independent  Med Delivery   whole    Wound Care Documentation and Therapy:  Wound 02/01/22 Rectum Medial redness swelling (Active)   Dressing Status Clean;Dry 02/01/22 1233   Wound Cleansed Irrigated with saline; Soap and water 02/01/22 1233   Number of days: 0        Elimination:  Continence: Bowel: Yes  Bladder: Yes  Urinary Catheter: None   Colostomy/Ileostomy/Ileal Conduit: No       Date of Last BM: 2/3/22  No intake or output data in the 24 hours ending 02/01/22 1755  No intake/output data recorded. Safety Concerns: At Risk for Falls    Impairments/Disabilities:      None    Nutrition Therapy:  Current Nutrition Therapy:   - Oral Diet:  Carb Control 4 carbs/meal (1800kcals/day)    Routes of Feeding: Oral  Liquids:  Thin Liquids  Daily Fluid Restriction: no  Last Modified Barium Swallow with Video (Video Swallowing Test): not done    Treatments at the Time of Hospital Discharge: Respiratory Treatments: ***  Oxygen Therapy:  is not on home oxygen therapy. Ventilator:    - No ventilator support    Rehab Therapies: Physical Therapy and Occupational Therapy  Weight Bearing Status/Restrictions: No weight bearing restirctions  Other Medical Equipment (for information only, NOT a DME order):  walker  Other Treatments: ***    Patient's personal belongings (please select all that are sent with patient):  None    RN SIGNATURE:  Electronically signed by Candy Silver RN on 2/3/22 at 2:50 PM EST    CASE MANAGEMENT/SOCIAL WORK SECTION    Inpatient Status Date: ***    Readmission Risk Assessment Score:  Readmission Risk              Risk of Unplanned Readmission:  77           Discharging to Facility/ Agency   Name:  [facility]  Address: [address] [place] [address]  Phone: [phone]  Fax: [phone]    Dialysis Facility (if applicable)   Name: [facility]  Address:  Dialysis Schedule:  Phone:  Fax:    / signature: Electronically signed by HANNAH Alvares on 2/2/22 at 9:52 AM EST    PHYSICIAN SECTION    Prognosis: Good    Condition at Discharge: Stable    Rehab Potential (if transferring to Rehab): Good    Recommended Labs or Other Treatments After Discharge:PT/OT, nursing, regularly scheduled hemodialysis     Physician Certification: I certify the above information and transfer of [facility]  is necessary for the continuing treatment of the diagnosis listed and that she requires [place] for less 30 days. Update Admission H&P: No change in H&P    PHYSICIAN SIGNATURE:  Electronically signed by Jeff Vargas DO on 2/3/22 at 1:06 PM EST                     HEART FAILURE  / CONGESTIVE HEART FAILURE  DISCHARGE INSTRUCTIONS:  GUIDELINES TO FOLLOW AT [address] [facility]:     MEDICATIONS:  Take your medication as directed.  If you are experiencing any side effects, inform your doctor, Do not stop taking any of your medications without letting your doctor know. Check with your doctor before taking any over-the-counter medications / herbal / or dietary supplements. They may interfere with your other medications. Do not take ibuprofen (Advil or Motrin) and naproxen (Aleve) without talking to your doctor first. They could make your heart failure worse. WEIGHT MONITORING:   Weigh yourself everyday (with the same scale) around the same time of the day and write it down. (you can chart them on a calendar or keep track of them on paper. Notify your doctor of a weight gain of 3 pounds or more in 1 day   OR a total of 5 pounds or more in 1 week    Take your weight record to your doctor visits  Also, the same goes if you loose more than 3# in one day, let your heart doctor know. DIET:   Cardiac heart healthy diet- Low saturated / low trans fat, no added salt, caffeine restricted, Low sodium diet-   No more than 2,000mg (2 grams) of salt / sodium per day (which equals to a little less than  a teaspoon of salt)  If your doctor wants you on a fluid restriction. ..it is usually recommended a fluid limit of 2,000cc -  Fluid restriction- 2,000 ml (milliliters) = 64 ounces = you can have 8 glasses of fluid per day (each glass 8 ounces)    Follow a low salt diet - avoid using salt at the table, avoid / limit use of canned soups, processed / packaged foods, salted snacks, olives and pickles. Do not use a salt substitute without checking with your doctor, they may contain a high amount of potassioum. (Mrs. Charissa Garcia is safe to use).     Limit the use of alcohol       CALL YOUR DOCTOR THE FIRST DAY YOU NOTICE ANY OF THESE   SYMPTOMS:  You have a weight gain of 3 pounds or more in 1 day         OR 5 pounds or more in one week  More shortness of breath  More swelling of your stomach, legs, ankles or feet  Feeling more tired, No energy  Dry hacky cough  Dizziness  More chest pain / discomfort       (CALL 911 IF ANY OF THE FOLLOWING OCCURS  Chest pain (not relieved with nitroglycerine, if you have been prescribed this medication)  Severe shortness of breath  Faint / Pass out  Confusion / cannot think clearly  If symptoms get worse           SMOKING - TOBACCO USE:  * IF YOU SMOKE - STOP! Kick the habit. [address] Program is offered at [facility] [address] and [address] [facility]. Call [phone] extension [phone] for more information. ACTIVITY:   (Ask your doctor when you will be able to return to work and before starting any exercise program.  Do not drive unless unless your doctor has given you permission to do so). Start light exercise. Even if you can only do a small amount, exercise will help you get stronger, have more energy, help manage your weight and decrease  stress. Walking is an easy way to get exercise. Start out slowly and  increase the amount you walk as tolerated  If you become short of breath, dizzy or have chest pain; stop, sit down, and rest.  If you feel \"wiped out\" the day after you exercise, walk at a slower pace or for a shorter distance. You can gradually increase the pace or amount of time. (Do not exercise right after a meal or in extreme temperatures, such as above 85 degrees, if the air is really humid, or wind chill is less than 20 degrees)                                             ADDITIONAL INFORMATION:  Avoid getting sick from colds and the flu. Stay away from friends or family that you know may have a contagious illness  Get plenty of rest   Get a flu shot each year. Get a pneumococcal vaccine shot. If you have had one before, ask your doctor whether you need another dose. My Goal for Self-management of Heart Failure Includes 5 steps :    1. Notice a change in symptoms ( weight gain, short of breath, leg swelling, decreased activity level, bloating. ...)    2.  Evaluate the change: (use the Heart Failure Zones )     3. Decide to take action: decide what your options are, such as: (call your doctor for an extra visit, take a prescribed medication, such as your water pill if your doctor has given you directions to do so, [address])    4. Come up with a strategy:  (now you call the doctor for advice / appointment. This is where you take action!!! Do not wait, catch the symptom early and treat it before it worsens. 5. Evaluate the response: The next day, check your Heart Failure Zones: are you in the GREEN ZONE (safe zone)? Worsening symptoms of YELLOW ZONE? Or have you moved to the RED ZONE and need to call 911 or go to the Emergency Room for evaluation? Call your doctor's office to update them on your symptoms of heart failure. Learning About Heart Failure Zones  What are heart failure zones? Heart failure zones give you an easy way to see changes in your heart failure symptoms. They also tell you when you need to get help. Check every day to see which zone you are in. Green zone. You are doing well. This is where you want to be. Your weight is stable. It's not going up or down. You breathe easily. You are sleeping well. You are able to lie flat without shortness of breath. You can do your usual activities. Yellow zone. Be careful. Your symptoms are changing. Call your doctor. You have new or increased shortness of breath. You are dizzy or lightheaded, or you feel like you may faint. You have sudden weight gain, such as more than 2 to 3 pounds in a day or 5 pounds in a week. (Your doctor may suggest a different range of weight gain.)  You have increased swelling in your legs, ankles, or feet. You are so tired or weak that you can't do your usual activities. You are not sleeping well. Shortness of breath wakes you up at night. You need extra pillows. Red zone. 911  This is an emergency. Call . You have symptoms of sudden heart failure. For example:   You have severe trouble breathing. You cough up pink, foamy mucus. You have a new irregular or fast heartbeat. You have symptoms of a heart attack. These may include:  Chest pain or pressure, or a strange feeling in the chest.  Sweating. Shortness of breath. Nausea or vomiting. Pain, pressure, or a strange feeling in the back, neck, jaw, or upper belly or in one or both shoulders or arms. Lightheadedness or sudden weakness. A fast or irregular heartbeat. If you have symptoms of a heart attack 911 : After you call , the  may tell you to chew 1 adult-strength or 2 to 4 low-dose aspirin. Wait for an ambulance. Do not try to drive yourself. Follow-up care is a key part of your treatment and safety. Be sure to make and go to all appointments, and call your doctor if you are having problems. It's also a good idea to know your test results and keep a list of the medicines you take. Where can you learn more? Go to https://vitaMedMD.myThings. org and sign in to your [employer] account. Enter T174 in the [facility] box to learn more about \"Learning About Heart Failure Zones. \"     If you do not have an account, please click on the \"Sign Up Now\" link. Current as of: August 31, 2020               Content Version: 12.9  © 1853-3193 [employer]. Care instructions adapted under license by [facility] ([facility]). If you have questions about a medical condition or this instruction, always ask your healthcare professional. [place] [address] any warranty or liability for your use of this information.

## 2022-02-01 NOTE — ED PROVIDER NOTES
[address] [facility]  Department of Emergency Medicine     Written by: Caridad Rodriguez DO  Patient Name: Theo Corona  Attending Provider: Ming Andrew DO  Admit Date: 2022  8:38 AM  MRN: [id number]                   : 1990        Chief Complaint   Patient presents with    Chest Pain    Hypotension     low bp during dialysis treatment    - Chief complaint    Ms. Ada Benavides is a 35 yo female with a PMHx of DM1, HTN, and ESRD on dialysis who presents to the ED due to chest pain and hypotension. Patient was receiving dialysis treatments today when she became hypotensive and started having chest pain. She notes that the chest pain was left-sided and radiated into her left shoulder. States the pain had been constant since onset. She had roughly 1/3 of her dialysis treatment completed prior to this happening which forced him to stop and transfer her to the emergency department. Denies any aggravating or relieving factors. She also notes that she has generalized abdominal pain, has had diarrhea for roughly 1 month and intermittent shortness of breath. She denies any fever, chills, nausea, vomiting, urinary changes, headaches or vision changes. Review of Systems   Constitutional: Positive for activity change and fatigue. Negative for chills and fever. HENT: Negative for congestion, rhinorrhea, sinus pressure, sinus pain and sneezing. Eyes: Negative for pain and redness. Respiratory: Positive for shortness of breath. Negative for cough and chest tightness. Cardiovascular: Positive for chest pain. Negative for palpitations and leg swelling. Gastrointestinal: Positive for abdominal pain and diarrhea. Negative for constipation, nausea and vomiting. Genitourinary: Negative for dysuria, flank pain, frequency, hematuria and urgency. Musculoskeletal: Positive for myalgias. Negative for arthralgias, back pain, gait problem and joint swelling. Skin: Negative for rash. Neurological: Negative for dizziness, light-headedness and headaches. Physical Exam  Exam conducted with a chaperone present. Constitutional:       General: She is in acute distress. Appearance: Normal appearance. She is normal weight. She is ill-appearing. HENT:      Head: Normocephalic and atraumatic. Right Ear: External ear normal.      Left Ear: External ear normal.      Mouth/Throat:      Mouth: Mucous membranes are moist.      Pharynx: Oropharynx is clear. Eyes:      Extraocular Movements: Extraocular movements intact. Conjunctiva/sclera: Conjunctivae normal.   Cardiovascular:      Rate and Rhythm: Normal rate and regular rhythm. Pulses: Normal pulses. Heart sounds: Normal heart sounds. Pulmonary:      Effort: Pulmonary effort is normal. No respiratory distress. Breath sounds: Normal breath sounds. No wheezing. Abdominal:      General: Abdomen is flat. Bowel sounds are normal.      Palpations: Abdomen is soft. Tenderness: There is abdominal tenderness (Generalized). There is no guarding or rebound. Genitourinary:     Rectum: Tenderness (Erythema surrounding rectum) present. Musculoskeletal:         General: Normal range of motion. Cervical back: Normal range of motion and neck supple. No rigidity or tenderness. Skin:     General: Skin is warm and dry. Neurological:      General: No focal deficit present. Mental Status: She is alert and oriented to person, place, and time. Mental status is at baseline. Motor: No weakness. Psychiatric:         Mood and Affect: Mood normal.         Behavior: Behavior normal.          Procedures       MDM  Number of Diagnoses or Management Options  Diagnosis management comments: This is a 37 yo female with a PMHx of DM1, HTN, and ESRD on dialysis who presents to the ED due to chest pain and hypotension. ED revealed normal sinus rhythm with a rate of 87 without any acute interval or ST segment changes. CBC revealed hemoglobin 9.7 which appears mild prior studies. BMP revealed hyperkalemia with potassium of 5.1 and a creatinine of 4.1 which is in line with her only finishing third of her dialysis treatment, and she was hyperglycemic with a glucose of 63 and a repeat of 41. After giving him orange juice and letting her eat her blood sugar came up to 132. Hepatic function panel revealed an elevated alk phos and otherwise benign findings. Venous pH was acidosis of 7.25. Beta hydroxybutyrate was 0.09. Pregnancy test was negative. Lactic acid was normal at 10. 1. proBNP was elevated greater than 70,000. Initial troponin was 146 with repeat of 132. Chest x ray revealed hazy bilateral perihilar airspace disease favored represent volume overload with patchy opacities within the lung bases which could represent atelectasis or pneumonia. She tested positive for Covid last month but recently had a negative Covid 2 weeks ago. She will be admitted to the hospital for metabolic acidosis of unknown origin and in order to obtain further dialysis treatment while in hospital.       ED Course as of 02/01/22 [address] [place] [address] Feb 01, 2022   1003   ATTENDING PROVIDER ATTESTATION:     I have personally performed and/or participated in the history, exam, medical decision making, and procedures and agree with all pertinent clinical information unless otherwise noted. I have also reviewed and agree with the past medical, family and social history unless otherwise noted. I have discussed this patient in detail with the resident and provided the instruction and education regarding the evidence-based evaluation and treatment of chest pain and low blood pressure. History: Patient presents from dialysis. She finished approximately one third of her treatment. While she was there she was found to be hypotensive. She was also experiencing left-sided chest pain that ran into her left shoulder.   She also reports some mild abdominal pre-eclampsia in third trimester, Shock liver, and Valvular endocarditis. Past Surgical History:  has a past surgical history that includes ECHO Compl W Dop Color Flow (2012); ECHO Compl W Dop Color Flow (6/10/2013);  section; Tunneled venous port placement (2018); pr esophagogastroduodenoscopy transoral diagnostic (N/A, 2018); back surgery; Colonoscopy (N/A, 2018); Colonoscopy (N/A, 2018); Upper gastrointestinal endoscopy (2018); Upper gastrointestinal endoscopy (N/A, 10/11/2019); Cholecystectomy, laparoscopic (N/A, 2019); LAPAROSCOPY INSERTION PERITONEAL CATHETER (N/A, 2020); transesophageal echocardiogram (N/A, 10/19/2020); embolectomy (N/A, 2020); Foot Debridement (Left, 2021); Foot Debridement (Left, 2021); Upper gastrointestinal endoscopy (N/A, 2021); and Catheter Removal (N/A, 10/1/2021). Social History:  reports that she quit smoking about a year ago. Her smoking use included cigarettes. She has a 3.00 pack-year smoking history. She has never used smokeless tobacco. She reports current drug use. Drug: Opiates . She reports that she does not drink alcohol. Family History: family history includes Asthma in her brother and mother; Diabetes in her mother; High Blood Pressure in her father and mother; Hypertension in her mother. The patients home medications have been reviewed.     Allergies: Cefepime and Toradol [ketorolac tromethamine]    -------------------------------------------------- RESULTS -------------------------------------------------    LABS:  Results for orders placed or performed during the hospital encounter of 22   CBC Auto Differential   Result Value Ref Range    WBC 10.3 4.5 - 11.5 E9/L    RBC 3.29 (L) 3.50 - 5.50 E12/L    Hemoglobin 8.7 (L) 11.5 - 15.5 g/dL    Hematocrit 27.3 (L) 34.0 - 48.0 %    MCV 83.0 80.0 - 99.9 fL    MCH 26.4 26.0 - 35.0 pg    MCHC 31.9 (L) 32.0 - 34.5 %    RDW 22.7 (H) 11.5 - 15.0 fL Platelets 205 (L) 755 - 450 E9/L    MPV NOT CALC 7.0 - 12.0 fL    Neutrophils % 75.2 43.0 - 80.0 %    Lymphocytes % 20.4 20.0 - 42.0 %    Monocytes % 1.8 (L) 2.0 - 12.0 %    Eosinophils % 0.9 0.0 - 6.0 %    Basophils % 1.8 0.0 - 2.0 %    Neutrophils Absolute 7.73 (H) 1.80 - 7.30 E9/L    Lymphocytes Absolute 2.06 1.50 - 4.00 E9/L    Monocytes Absolute 0.21 0.10 - 0.95 E9/L    Eosinophils Absolute 0.09 0.05 - 0.50 E9/L    Basophils Absolute 0.19 0.00 - 0.20 E9/L    Anisocytosis 3+     Polychromasia 1+     Poikilocytes 2+     Holman Cells 1+     Target Cells 1+    Lipase   Result Value Ref Range    Lipase 8 (L) 13 - 60 U/L   Troponin   Result Value Ref Range    Troponin, High Sensitivity 146 (H) 0 - 9 ng/L   Brain Natriuretic Peptide   Result Value Ref Range    Pro-BNP >70,000 (H) 0 - 125 pg/mL   Lactic Acid, Plasma   Result Value Ref Range    Lactic Acid 2.1 0.5 - 2.2 mmol/L   Basic Metabolic Panel w/ Reflex to MG   Result Value Ref Range    Sodium 140 132 - 146 mmol/L    Potassium reflex Magnesium 5.1 (H) 3.5 - 5.0 mmol/L    Chloride 107 98 - 107 mmol/L    CO2 22 22 - 29 mmol/L    Anion Gap 11 7 - 16 mmol/L    Glucose 63 (L) 74 - 99 mg/dL    BUN 22 (H) 6 - 20 mg/dL    CREATININE 4.1 (H) 0.5 - 1.0 mg/dL    GFR Non-African American 15 >=60 mL/min/1.73    GFR African American 15     Calcium 8.4 (L) 8.6 - 10.2 mg/dL   Hepatic Function Panel   Result Value Ref Range    Total Protein 6.2 (L) 6.4 - 8.3 g/dL    Albumin 2.7 (L) 3.5 - 5.2 g/dL    Alkaline Phosphatase 241 (H) 35 - 104 U/L    ALT 11 0 - 32 U/L    AST 15 0 - 31 U/L    Total Bilirubin 0.4 0.0 - 1.2 mg/dL    Bilirubin, Direct <0.2 0.0 - 0.3 mg/dL    Bilirubin, Indirect see below 0.0 - 1.0 mg/dL   pH, venous   Result Value Ref Range    pH, Khurram 7.25 (L) 7.35 - 7.45   Beta-Hydroxybutyrate   Result Value Ref Range    Beta-Hydroxybutyrate 0.09 0.02 - 0.27 mmol/L   HCG, SERUM, QUALITATIVE   Result Value Ref Range    hCG Qual NEGATIVE NEGATIVE   Troponin   Result Value Ref Range    Troponin, High Sensitivity 132 (H) 0 - 9 ng/L   POCT Glucose   Result Value Ref Range    Meter Glucose 41 (L) 74 - 99 mg/dL   POCT Glucose   Result Value Ref Range    Meter Glucose 132 (H) 74 - 99 mg/dL   EKG 12 Lead   Result Value Ref Range    Ventricular Rate 87 BPM    Atrial Rate 87 BPM    P-R Interval 126 ms    QRS Duration 82 ms    Q-T Interval 390 ms    QTc Calculation (Bazett) 469 ms    P Axis 29 degrees    R Axis 44 degrees    T Axis 48 degrees       RADIOLOGY:  XR CHEST PORTABLE   Final Result   1. Hazy bilateral perihilar airspace disease favored to represent volume   overload. 2. Patchy opacities within the lung bases which could represent atelectasis   or pneumonia. EKG:  This EKG is signed and interpreted by me. Rate: 87  Rhythm: Sinus  Interpretation: no acute changes  Comparison: was normal and no previous EKG available      ------------------------- NURSING NOTES AND VITALS REVIEWED ---------------------------  Date / Time Roomed:  2/1/2022  8:38 AM  ED Bed Assignment:  03/03    The nursing notes within the ED encounter and vital signs as below have been reviewed. Patient Vitals for the past 24 hrs:   BP Temp Pulse Resp SpO2 Height Weight   02/01/22 1154 106/83  91 18      02/01/22 0843 102/76 97 °F (36.1 °C) 87 16 99 % 5' 4\" (1.626 m) 140 lb (63.5 kg)       Oxygen Saturation Interpretation: Normal    ------------------------------------------ PROGRESS NOTES ------------------------------------------  Re-evaluation(s):  Time: 1400  Patients symptoms show no change  Repeat physical examination is not changed    Counseling:  I have spoken with the patient and discussed todays results, in addition to providing specific details for the plan of care and counseling regarding the diagnosis and prognosis.   Their questions are answered at this time and they are agreeable with the plan of admission.    --------------------------------- ADDITIONAL PROVIDER NOTES ---------------------------------  Consultations:  Time: 1663. Spoke with Dr. Thelma Brown. Discussed case. They will admit the patient. This patient's ED course included: a personal history and physicial examination, re-evaluation prior to disposition, multiple bedside re-evaluations, IV medications, cardiac monitoring, continuous pulse oximetry and complex medical decision making and emergency management    This patient has remained hemodynamically stable during their ED course. Diagnosis:  1. Metabolic acidosis    2. Hypoglycemia    3. Hypervolemia, unspecified hypervolemia type        Disposition:  Patient's disposition: Admit to telemetry  Patient's condition is stable. Patient was seen and evaluated by myself and my attending Neha Upton DO. Assessment and Plan discussed with attending provider, please see attestation for final plan of care.      DO Mimi Roger DO  Resident  02/01/22 [id number]

## 2022-02-01 NOTE — CONSULTS
Department of Internal Medicine  Nephrology Nurse Practitioner Consult Note      Reason for Consult:  End-Stage Renal Disease  Requesting Physician:  Georges Nicholson DO    CHIEF COMPLAINT:  Chest pain, hypotensionj    History Obtained From:  patient, electronic medical record    HISTORY OF PRESENT ILLNESS:  Briefly, Miss Davey Luna is a 36year-old female with a PMH of ESRD on hemodialysis 3 days/wk, on TTS schedule at [facility], Type I DM, poorly controlled with recurrent admissions for DKA, HTN, gastroparesis, ascites, infective endocarditis, cardiac arrest, hypothyroidism and depression. She was recently discharged on 1/27/22 after being admitted with DKA. She was recently moved to a nursing home. She presented to the ER on February 1, 2022 with complaints of chest pain and hypotension during dialysis today. She roughly had only 1/3 of her dialysis treatment before being transported the the ER. She reports having diarrhea for roughly one month and intermittently shortness of breath. In the ER she was found to have a potassium of 5.1, BUN 22, creatinine 4.1, glucose 41, calcium 8.4, pro-bnp > 70,000.  Renal is consulted for ESRD on HD       Past Medical History:        Diagnosis Date    Acute congestive heart failure ([address]     BC (acute kidney injury) ([address] 10/01/2019    Cardiac arrest ([address] 02/15/2021    Cephalgia 10/09/2019    Chronic kidney disease     Depression     Diabetes mellitus ([address]     Diabetic gastroparesis associated with type 1 diabetes mellitus ([address] 12/17/2018    Diabetic ketoacidosis ([address] 08/27/2011    Diabetic ketoacidosis with coma associated with type 1 diabetes mellitus ([address] 06/26/2013    Diabetic polyneuropathy associated with type 1 diabetes mellitus ([address] 12/27/2020    Drug use complicating pregnancy in third trimester     Endocarditis 10/31/2020    ESRD (end stage renal disease) ([address] 09/29/2020    H/O cardiovascular stress test 08/27/2021    [facility]    Hemod[facility] patient [facility])     History of blood transfusion 2019    Hyperlipidemia 10/08/2020    Hyperosmolar hyperglycemic state (HHS) ([facility] 2020    Hypothyroidism 10/08/2020    Iron deficiency anemia 10/01/2019    MDRO (multiple drug resistant organisms) resistance     MRSA (methicillin resistant Staphylococcus aureus)     back wound abcess    Non compliance w medication regimen 2016    Other disorders of kidney and ureter     Pregnancy 2016    16 weeks    Previous  delivery affecting pregnancy, antepartum 2017    Previous stillbirth or demise, antepartum 2016    Seizure ([facility] 2020    Severe pre-eclampsia in third trimester 2016    Shock liver 02/15/2021    Valvular endocarditis 11/10/2020    This Diagnosis was added to the Problem List based on transcribed orders from Dr. Infante Prior        Past Surgical History:        Procedure Laterality Date    BACK SURGERY      abscess    CATHETER REMOVAL N/A 10/1/2021    PERITONEAL CATHETER REMOVAL performed by Margarett Cheadle, MD at [facility] 49      x2   [facility], LAPAROSCOPIC N/A 2019    CHOLECYSTECTOMY LAPAROSCOPIC performed by Elias Lowe MD at [address] [place] [address] N/A 2018    COLONOSCOPY WITH BIOPSY performed by Augusta Suarez MD at [place] [address] N/A 2018    COLONOSCOPY WITH BIOPSY performed by Javan Rivera MD at [address] [place] [address] ECHO COMPL W [address]  2012    EF 57%    ECHO COMPL W DOP COLOR FLOW  6/10/2013         EMBOLECTOMY N/A 2020    ANGIOVAC, VEGECTOMY, YULISSA -- REQS ROOM 3 performed by Savi Malhotra MD at [address] [place] [address] Left 2021    LEFT LEG INCISION AND DRAINAGE, DEBRIDEMENT, WOUND VAC APPLICATION performed by Ijeoma Parikh DPM at [address] [place] [address] Left 2021    LEFT LEG DEBRIDEMENT BIOPSY POSS APPLICATION WOUND VAC performed by Costa Montanez DPM at [address] [place] [address] PERITONEAL CATHETER N/A 6/26/2020    LAPAROSCOPIC INSERTION PERITONEAL DIALYSIS CATHETER performed by Antonio Aguayo MD at [facility] 80 ESOPHAGOGASTRODUODENOSCOPY TRANSORAL DIAGNOSTIC N/A 5/30/2018    EGD ESOPHAGOGASTRODUODENOSCOPY performed by Sofi Almodovar MD at [address] [place] [address] TRANSESOPHAGEAL ECHOCARDIOGRAM N/A 10/19/2020    TRANSESOPHAGEAL ECHOCARDIOGRAM WITH BUBBLE STUDY performed by Sreedhar Henriquez MD at [address] [place] [address] TUNNELED VENOUS PORT PLACEMENT  04/2018    UPPER GASTROINTESTINAL ENDOSCOPY  12/18/2018    EGD BIOPSY performed by Flower Sandra MD at [address] 10/11/2019    EGD ESOPHAGOGASTRODUODENOSCOPY performed by Meri Velazquez DO at [address] [facility] [facility] N/A 7/14/2021    EGD BIOPSY performed by Sam Wagner MD at [facility] 23     Current Medications:    Current Facility-Administered Medications: zinc oxide 40 % paste, , Topical, 4x Daily PRN  Allergies:  Cefepime and Toradol [ketorolac tromethamine]    Social History:    TOBACCO:   reports that she quit smoking about a year ago. Her smoking use included cigarettes. She has a 3.00 pack-year smoking history. She has never used smokeless tobacco.  ETOH:   reports no history of alcohol use. DRUGS:   reports current drug use. Drug: Opiates . Family History:       Problem Relation Age of Onset   Gerber Layer Asthma Mother     Hypertension Mother     High Blood Pressure Mother     Diabetes Mother     Asthma Brother     High Blood Pressure Father      REVIEW OF SYSTEMS:   Constitutional: Positive for activity change and fatigue. Negative for chills and fever. HENT: Negative for congestion, rhinorrhea, sinus pressure, sinus pain and sneezing. Eyes: Negative for pain and redness. Respiratory: Positive for shortness of breath. Negative for cough and chest tightness. Cardiovascular: Positive for chest pain. Negative for palpitations and leg swelling. Gastrointestinal: Positive for abdominal pain and diarrhea. Negative for constipation, nausea and vomiting. Genitourinary: Negative for dysuria, flank pain, frequency, hematuria and urgency. Musculoskeletal: Positive for myalgias. Negative for arthralgias, back pain, gait problem and joint swelling. Skin: Negative for rash. Neurological: Negative for dizziness, light-headedness and headaches.         PHYSICAL EXAM:      Vitals:    VITALS:  /83   Pulse 91   Temp 97 °F (36.1 °C)   Resp 18   Ht 5' 4\" (1.626 m)   Wt 140 lb (63.5 kg)   SpO2 99%   BMI 24.03 kg/m²   24HR INTAKE/OUTPUT:  No intake or output data in the 24 hours ending 02/01/22 1509    Access: RIJ tunneled dialysis catheter  Constitutional:  Lethargic, but awakens to voice  HEENT:  PERRL, normocephalic, atraumatic  Respiratory:  CTA bilaterally  Cardiovascular/Edema:  ST, RRR, no murmur gallop or rub  Gastrointestinal:  abd distended, c/o persistent abdominal pain  Neurologic:  Lethargic, awakens to voice, follows commands, no focal deficit  Skin:  Warm, dry, intact    DATA:    CBC with Differential:    Lab Results   Component Value Date    WBC 10.3 02/01/2022    RBC 3.29 02/01/2022    HGB 8.7 02/01/2022    HCT 27.3 02/01/2022     02/01/2022    MCV 83.0 02/01/2022    MCH 26.4 02/01/2022    MCHC 31.9 02/01/2022    RDW 22.7 02/01/2022    NRBC 0.9 08/10/2020    SEGSPCT 67 06/27/2013    METASPCT 1.7 08/10/2020    LYMPHOPCT 20.4 02/01/2022    MONOPCT 1.8 02/01/2022    MYELOPCT 0.9 01/19/2022    BASOPCT 1.8 02/01/2022    MONOSABS 0.21 02/01/2022    LYMPHSABS 2.06 02/01/2022    EOSABS 0.09 02/01/2022    BASOSABS 0.19 02/01/2022     CMP:    Lab Results   Component Value Date     02/01/2022    K 5.1 02/01/2022     02/01/2022    CO2 22 02/01/2022    BUN 22 02/01/2022    CREATININE 4.1 02/01/2022    GFRAA 15 02/01/2022    LABGLOM 15 02/01/2022    GLUCOSE 63 02/01/2022    GLUCOSE 130 05/18/2012    PROT 6.2 02/01/2022    LABALBU 2.7 02/01/2022    LABALBU 4.1 05/18/2012    CALCIUM 8.4 02/01/2022    BILITOT 0.4 02/01/2022    ALKPHOS 241 02/01/2022    AST 15 02/01/2022    ALT 11 02/01/2022     Magnesium:    Lab Results   Component Value Date    MG 1.6 01/25/2022     Phosphorus:    Lab Results   Component Value Date    PHOS 2.4 01/23/2022     Radiology Review:            Chest XR 2/1/22   1. Hazy bilateral perihilar airspace disease favored to represent volume   overload. 2. Patchy opacities within the lung bases which could represent atelectasis   or pneumonia. IMPRESSION/RECOMMENDATIONS:  Briefly, Miss Margo Suggs is a 36year-old female with a PMH of ESRD on hemodialysis 3 days/wk, Type I DM, poorly control on TTS schedule at [place], and recurrent admissions for DKA, HTN, gastroparesis, ascites, infective endocarditis, cardiac arrest, hypothyroidism and depression. She was recently discharged on 1/27/22 after being admitted with DKA. She presented to the ER on February 1, 2022 with complaints of chest pain and hypotension. She was receiving dialysis treatment today when she because hypotensive and started having chest pain. She roughly had only 1/3 of her dialysis treatment before being transported the the ER. She reports having diarrhea for roughly one month and intermittently shortness of breath. In the ER she was found to have a potassium of 5.1, BUN 22, creatinine 4.1, glucose 63, calcium 8.4, pro-bnp > 70,000. Renal is consulted for ESRD on HD       1. ESRD on  hemodialysis 3 days/wk  via RIJ tunneled dialysis catheter. To continue HD TTS while inpatient. Will schedule for HD 2 hours today   2. Hyperkalemia, will schedule for HD   2. HTN, on lopressor and amlodipine   3. HFpEF 60-65%, Pro-BNP > 70,000  4. Hypocalcemia, 2/2 vitamin D deficiency on CKD. 5. Vitamin D deficiency,  On ergocalciferol   6. 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 Anemia of CKD, iron level 40, iron saturation 77%,   8.  Type I DM, poorly controlled with frequent readmissions for DKA  ------------------------------------------------------  10. Hx Covid 19, unvaccinated  11. Restless leg syndrome, on ropinirole at home  12. Depression, on Abilify at home  13. Hypothyroidism, on levothyroxine  14. History of gastroparesis, on metoclopramide at home     Plan:     · HD for 2 hours today  · HD 3 times a week T-Th-Sat  · Obtain Phosphorus level   · Start epoetin alpha 5,000 units 3 times/wk  · Continue ergocalciferol 50,000 units weekly  · Continue to monitor phosphorus levels  · Continue to monitor potassium level  · Continue to monitor H&H   · Continue to monitor renal function  · Change diet to carb controlled diet         Thank you very much Dr. Kimi Vivar DO for allowing us to participate in the care with [facility].    Patient seen and examined and agree with above as annotated  Discussed with KIM Mancuso MD

## 2022-02-01 NOTE — PROGRESS NOTES
Physician Progress Note      Freddie Cerda  CSN #:                  [id number]  :                       1990  ADMIT DATE:       2021 12:39 PM  Sonia Tobias DATE:        2021 5:58 PM  RESPONDING  PROVIDER #:        Guillermo Byrd MD          QUERY TEXT:    Pt admitted with Ascites. 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 If possible, please   document in progress notes and discharge summary:    The medical record reflects the following:  Risk Factors: ESRD, Gastroparesis, DM type 2 uncontrolled  Clinical Indicators: CT showed sever ascites, 4L removed, Dr. Keisha Isaac   \"Ascites, etiology? Possibly nephrogenic ascites , s/p paracentesis. GI   following. We may need daily dialysis\", \"HD initiated 2021 after   failed peritoneal dialysis with persistent hyperkalemia. \"  Cytology negative for malignancy, \"SBP ruled out\"  by GI Dr. Magdalene Blizzard. Treatment: IR paracentesis, IV Levaquin and Clindamycin -multiple antibiotic   allergies noted, Dialysis, Nephrology consult, GI consult, CT ab/pelvis    Thank You,  Maggie GRAMAJON, CRCR, RN, CDS  CDI  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     Query created by: Neil Rubio on 2021 12:20 PM      Electronically signed by:  Guillermo Byrd MD 2022 8:49 AM

## 2022-02-01 NOTE — ED NOTES
Bed: 03  Expected date: 2/1/22  Expected time:   Means of arrival:   Comments:  jaylyn Xie RN  02/01/22 [id number]

## 2022-02-01 NOTE — H&P
[address] [place] [address] Hospitalist Group   History and Physical      CHIEF COMPLAINT:  Chest pain     History of Present Illness:  36 y.o. female with a history of ESRD on hemodialysis, brittle type I diabetes mellitus, generalized chronic pain, diabetic gastroparesis, past endocarditis and cardiac arrest presents with chest pain and hypotension during dialysis. She was recently admitted to [facility] for DKA and was subsequently discharge to [address] [place] [address]. She was at hemodialysis today and had an episode of hypotension and left sided chest pain. No active substernal chest pain at this time; currently having pain \"all over all the time\". States the pain is making her anxiety worse. She was seen by pain management on prior hospitalization who recommended against narcotics as this will make gastroparesis symptoms worse. In the ED, vital signs within normal limits. EKG shows nonspecific changes but no acute ischemic changes. High sensitivity troponin elevated, but lower than most recent admission. CXR consistent with volume overload. Informant(s) for H&P:Patient, ED resident physician, chart review     REVIEW OF SYSTEMS:  no fevers, chills, cp, sob, n/v, ha, vision/hearing changes, wt changes, hot/cold flashes, other open skin lesions, diarrhea, constipation, dysuria/hematuria unless noted in HPI. Complete ROS performed with the patient and is otherwise negative.       PMH:  Past Medical History:   Diagnosis Date    Acute congestive heart failure ([address]     BC (acute kidney injury) ([address] 10/01/2019    Cardiac arrest ([address] 02/15/2021    Cephalgia 10/09/2019    Chronic kidney disease     Depression     Diabetes mellitus ([address]     Diabetic gastroparesis associated with type 1 diabetes mellitus ([address] 12/17/2018    Diabetic ketoacidosis ([address] 08/27/2011    Diabetic ketoacidosis with coma associated with type 1 diabetes mellitus ([address] 06/26/2013    Diabetic polyneuropathy associated with type 1 diabetes mellitus ([facility] 2020    Drug use complicating pregnancy in third trimester     Endocarditis 10/31/2020    ESRD (end stage renal disease) ([facility] 2020    H/O cardiovascular stress test 2021    Lexiscan    Hemod[facility])     History of blood transfusion 2019    Hyperlipidemia 10/08/2020    Hyperosmolar hyperglycemic state (HHS) ([facility] 2020    Hypothyroidism 10/08/2020    Iron deficiency anemia 10/01/2019    MDRO (multiple drug resistant organisms) resistance     MRSA (methicillin resistant Staphylococcus aureus)     back wound abcess    Non compliance w medication regimen 2016    Other disorders of kidney and ureter     Pregnancy 2016    16 weeks    Previous  delivery affecting pregnancy, antepartum 2017    Previous stillbirth or demise, antepartum 2016    Seizure ([facility] 2020    Severe pre-eclampsia in third trimester 2016    Shock liver 02/15/2021    Valvular endocarditis 11/10/2020    This Diagnosis was added to the Problem List based on transcribed orders from Dr. Laura Macias          Surgical History:  Past Surgical History:   Procedure Laterality Date    BACK SURGERY      abscess    CATHETER REMOVAL N/A 10/1/2021    PERITONEAL CATHETER REMOVAL performed by Denver Rucks, MD at [address] [place] [address]      x2   [facility], LAPAROSCOPIC N/A 2019    CHOLECYSTECTOMY LAPAROSCOPIC performed by Luca Acevedo MD at [facility] N/A 2018    COLONOSCOPY WITH BIOPSY performed by Malathi Francis MD at [place] [address] N/A 2018    COLONOSCOPY WITH BIOPSY performed by Jacob Phillips MD at [address] [place] [address] ECHO COMPL [address]  2012    EF 57%    ECHO COMPL W DOP COLOR FLOW  6/10/2013         EMBOLECTOMY N/A 2020    Trevor Escobar, YULISSA -- REQS ROOM 3 performed by Amina Shah MD at [address] [place] [address] Left 2021    LEFT LEG INCISION AND DRAINAGE, DEBRIDEMENT, WOUND VAC APPLICATION performed by Albino Martins DPM at [address] [facility] [address] Left 5/18/2021    LEFT LEG DEBRIDEMENT BIOPSY POSS APPLICATION WOUND VAC performed by Albino Martins DPM at [facility] N/A 6/26/2020    LAPAROSCOPIC INSERTION PERITONEAL DIALYSIS CATHETER performed by Saeed Jaimes MD at [facility] ESOPHAGOGASTRODUODENOSCOPY TRANSORAL DIAGNOSTIC N/A 5/30/2018    EGD ESOPHAGOGASTRODUODENOSCOPY performed by Rachana Valentin MD at [address] [facility] TRANSESOPHAGEAL ECHOCARDIOGRAM N/A 10/19/2020    TRANSESOPHAGEAL ECHOCARDIOGRAM WITH BUBBLE STUDY performed by Belen Cortes MD at [address] [facility] TUNNELED VENOUS PORT PLACEMENT  04/2018    UPPER GASTROINTESTINAL ENDOSCOPY  12/18/2018    EGD BIOPSY performed by Autumn Smith MD at [place] [address] 10/11/2019    EGD ESOPHAGOGASTRODUODENOSCOPY performed by Ciro Shone, DO at [place] [address] N/A 7/14/2021    EGD BIOPSY performed by Davi Soriano MD at [place] [address]       Medications Prior to Admission:    Prior to Admission medications    Medication Sig Start Date End Date Taking? Authorizing Provider   hydrOXYzine (VISTARIL) 25 MG capsule Take 1 capsule by mouth 3 times daily as needed for Anxiety (sleep) 1/28/22 2/11/22  Divine Young MD   pregabalin (LYRICA) 50 MG capsule Take 1 capsule by mouth 2 times daily for 30 days.  1/28/22 2/27/22  Divine Young MD   insulin lispro (HUMALOG) 100 UNIT/ML injection vial Inject 0-3 Units into the skin 4 times daily (before meals and nightly) 1/28/22   Divine Young MD   escitalopram (LEXAPRO) 5 MG tablet Take 1 tablet by mouth daily 1/28/22   Divine Young MD   dilTIAZem (CARDIZEM) 90 MG tablet Take 1 tablet by mouth 4 times daily Hold the morning of Hemodialysis  Hold For SBP <100, HR<55 1/28/22   Divine Young MD insulin glargine (LANTUS) 100 UNIT/ML injection vial Inject 1 Units into the skin every morning 1/26/22   Sarthak Harmon MD   Nutritional Supplements (GLUCOSE MANAGEMENT) TABS Take 1 tablet by mouth as needed (for blood glucose <70 or symptomatic low blood glucose) 1/25/22   Sarthak Harmon MD   cholestyramine Roxanna Baller) 4 g packet Take 1 packet by mouth 2 times daily 1/24/22   Sarthak Harmon MD   promethazine (PHENERGAN) 25 MG tablet Take 25 mg by mouth every 6 hours as needed for Nausea    Historical Provider, MD   ondansetron (ZOFRAN) 4 MG tablet Take 4 mg by mouth every 8 hours as needed for Nausea or Vomiting    Historical Provider, MD   albuterol sulfate  (90 Base) MCG/ACT inhaler Inhale 4 puffs into the lungs as needed for Wheezing or Shortness of Breath 1/5/22   Kishan Lopez DO   sevelamer (RENVELA) 800 MG tablet TAKE 2 TABLETS BY MOUTH 3 TIMES A DAY WITH MEALS AND TAKE 1 TABLET EVERY DAY AT BEDTIME    Historical Provider, MD   pantoprazole (PROTONIX) 40 MG tablet Take 1 tablet by mouth 2 times daily 12/17/21   Juliano Olson MD   docusate sodium (COLACE) 100 MG capsule Take 1 capsule by mouth daily  Patient taking differently: Take 100 mg by mouth daily as needed  12/13/21   Hoda Dinero MD   sennosides-docusate sodium (SENOKOT-S) 8.6-50 MG tablet Take 2 tablets by mouth nightly as needed for Constipation  Patient not taking: Reported on 12/17/2021 11/25/21   Bisi Pham PA-C   vitamin D (ERGOCALCIFEROL) 1.25 MG (83283 UT) CAPS capsule Take 1 capsule by mouth once a week 9/3/21   Gisel Garcia MD   mirtazapine (REMERON) 15 MG tablet Take 15 mg by mouth nightly    Historical Provider, MD   polyethylene glycol (GLYCOLAX) 17 g packet Take 17 g by mouth daily as needed for Constipation  Patient not taking: Reported on 12/17/2021    Historical Provider, MD   ARIPiprazole (ABILIFY) 5 MG tablet Take 5 mg by mouth nightly 4/18/21   Historical Provider, MD   levothyroxine (SYNTHROID) 75 MCG tablet TAKE 1 TABLET BY MOUTH IN THE MORNING BEFORE BREAKFAST 4/19/21   Jacinda Littlejohn DO   rOPINIRole (REQUIP) 0.25 MG tablet Take 0.25 mg by mouth nightly    Historical Provider, MD       Allergies:    Cefepime and Toradol [ketorolac tromethamine]    Social History:    reports that she quit smoking about a year ago. Her smoking use included cigarettes. She has a 3.00 pack-year smoking history. She has never used smokeless tobacco. She reports current drug use. Drug: Opiates . She reports that she does not drink alcohol. Family History:   family history includes Asthma in her brother and mother; Diabetes in her mother; High Blood Pressure in her father and mother; Hypertension in her mother. PHYSICAL EXAM:  Vitals:  /73   Pulse 88   Temp 97.4 °F (36.3 °C)   Resp 18   Ht 5' 4\" (1.626 m)   Wt 140 lb (63.5 kg)   SpO2 99%   BMI 24.03 kg/m²     General Appearance: alert and oriented to person, place and time and in no acute distress  Skin: warm and dry  Head: normocephalic and atraumatic  Eyes: pupils equal, round, and reactive to light, extraocular eye movements intact, conjunctivae normal  Neck: neck supple and non tender without mass   Pulmonary/Chest: Non-labored on room air. Clear to auscultation bilaterally   Cardiovascular: normal rate, normal S1 and S2 with flow murmur and no carotid bruits. No appreciable JVD  Abdomen: soft, non-tender, mildly distended, normal bowel sounds, no masses or organomegaly  Extremities: no cyanosis, no clubbing and no edema  Neurologic: no cranial nerve deficit and speech normal    LABS:  Recent Labs     02/01/22  0925      K 5.1*      CO2 22   BUN 22*   CREATININE 4.1*   GLUCOSE 63*   CALCIUM 8.4*       Recent Labs     02/01/22  0925   WBC 10.3   RBC 3.29*   HGB 8.7*   HCT 27.3*   MCV 83.0   MCH 26.4   MCHC 31.9*   RDW 22.7*   *   MPV NOT CALC       No results for input(s): POCGLU in the last 72 hours.       Radiology: XR CHEST PORTABLE    Result Date: 2/1/2022  EXAMINATION: ONE XRAY VIEW OF THE CHEST 2/1/2022 9:31 am COMPARISON: 1/22/2022 HISTORY: ORDERING SYSTEM PROVIDED HISTORY: chest pain TECHNOLOGIST PROVIDED HISTORY: Reason for exam:->chest pain FINDINGS: The cardiac silhouette is mildly enlarged. Note is made of a right dialysis catheter. Hazy bilateral perihilar airspace disease is noted favored to represent volume overload. There are persistent patchy opacities within the right and left lung bases favored to represent pneumonia or atelectasis. There is no gross pleural effusion. 1. Hazy bilateral perihilar airspace disease favored to represent volume overload. 2. Patchy opacities within the lung bases which could represent atelectasis or pneumonia. EKG: sinus rhythm with chronic non-specific changes     ASSESSMENT AND PLAN:      Principal Problem:    Other chest pain  Active Problems:    Volume overload  Resolved Problems:    * No resolved hospital problems. *      1. Atypical chest pain  -unlikely cardiac given troponin lower than prior admission, no acute ischemic changes on EKG, spontaneous resolution, and unremarkable nuclear stress in 8/2021  -monitor on telemetry   -repeat EKG tomorrow morning    2. Chronic generalized pain   -hold off on narcotic medications unless clear acute indication per prior pain management recommendation   -Lyrica increased on prior admission without improvement   -will consult pain management for further recommendations     3. End stage renal disease on hemodialysis with volume overload  -nephrology consulted for HD management     4. Type I diabetes mellitus  -not in DKA  -continue basal insulin with low dose corrective scale  -hypoglycemia orders in place     5. Sinus tachycardia  -continue diltiazem-gold mornings of dialysis     6. Anemia of chronic kidney disease  -continue Retacrit  -monitor hemoglobin     7.  Depression  -continue Lexapro and Abilify     Code Status: Full code DVT prophylaxis: subcutaneous heparin     NOTE: This report was transcribed using voice recognition software.  Every effort was made to ensure accuracy; however, inadvertent computerized transcription errors may be present.     Electronically signed by Kevyn Mishra DO on 2/1/2022 at 4:23 PM

## 2022-02-02 LAB
METER GLUCOSE: 242 MG/DL (ref 74–99)
METER GLUCOSE: 312 MG/DL (ref 74–99)
METER GLUCOSE: 71 MG/DL (ref 74–99)
METER GLUCOSE: 88 MG/DL (ref 74–99)
REASON FOR REJECTION: NORMAL
REJECTED TEST: NORMAL
SARS-COV-2, NAAT: NOT DETECTED

## 2022-02-02 PROCEDURE — 97530 THERAPEUTIC ACTIVITIES: CPT | Performed by: OCCUPATIONAL THERAPIST

## 2022-02-02 PROCEDURE — 6370000000 HC RX 637 (ALT 250 FOR IP): Performed by: INTERNAL MEDICINE

## 2022-02-02 PROCEDURE — 97112 NEUROMUSCULAR REEDUCATION: CPT | Performed by: PHYSICAL THERAPIST

## 2022-02-02 PROCEDURE — 96372 THER/PROPH/DIAG INJ SC/IM: CPT

## 2022-02-02 PROCEDURE — 99232 SBSQ HOSP IP/OBS MODERATE 35: CPT | Performed by: INTERNAL MEDICINE

## 2022-02-02 PROCEDURE — 82962 GLUCOSE BLOOD TEST: CPT

## 2022-02-02 PROCEDURE — 93005 ELECTROCARDIOGRAM TRACING: CPT | Performed by: INTERNAL MEDICINE

## 2022-02-02 PROCEDURE — G0378 HOSPITAL OBSERVATION PER HR: HCPCS

## 2022-02-02 PROCEDURE — 97165 OT EVAL LOW COMPLEX 30 MIN: CPT | Performed by: OCCUPATIONAL THERAPIST

## 2022-02-02 PROCEDURE — 87635 SARS-COV-2 COVID-19 AMP PRB: CPT

## 2022-02-02 PROCEDURE — 1200000000 HC SEMI PRIVATE

## 2022-02-02 PROCEDURE — 97161 PT EVAL LOW COMPLEX 20 MIN: CPT | Performed by: PHYSICAL THERAPIST

## 2022-02-02 PROCEDURE — 6370000000 HC RX 637 (ALT 250 FOR IP): Performed by: NURSE PRACTITIONER

## 2022-02-02 PROCEDURE — 6360000002 HC RX W HCPCS: Performed by: INTERNAL MEDICINE

## 2022-02-02 PROCEDURE — 2580000003 HC RX 258: Performed by: INTERNAL MEDICINE

## 2022-02-02 RX ORDER — SEVELAMER CARBONATE 800 MG/1
2 TABLET, FILM COATED ORAL
Status: ON HOLD | COMMUNITY
End: 2022-04-25 | Stop reason: HOSPADM

## 2022-02-02 RX ORDER — OMEPRAZOLE 20 MG/1
20 CAPSULE, DELAYED RELEASE ORAL 2 TIMES DAILY
Status: ON HOLD | COMMUNITY
End: 2022-02-03 | Stop reason: HOSPADM

## 2022-02-02 RX ADMIN — DILTIAZEM HYDROCHLORIDE 90 MG: 30 TABLET, FILM COATED ORAL at 14:03

## 2022-02-02 RX ADMIN — MIRTAZAPINE 15 MG: 15 TABLET, FILM COATED ORAL at 21:37

## 2022-02-02 RX ADMIN — DILTIAZEM HYDROCHLORIDE 90 MG: 30 TABLET, FILM COATED ORAL at 21:37

## 2022-02-02 RX ADMIN — ESCITALOPRAM OXALATE 5 MG: 10 TABLET ORAL at 10:23

## 2022-02-02 RX ADMIN — SEVELAMER CARBONATE 800 MG: 800 TABLET, FILM COATED ORAL at 16:54

## 2022-02-02 RX ADMIN — Medication 10 ML: at 21:43

## 2022-02-02 RX ADMIN — ARIPIPRAZOLE 5 MG: 5 TABLET ORAL at 21:37

## 2022-02-02 RX ADMIN — DILTIAZEM HYDROCHLORIDE 90 MG: 30 TABLET, FILM COATED ORAL at 16:54

## 2022-02-02 RX ADMIN — LEVOTHYROXINE SODIUM 75 MCG: 0.07 TABLET ORAL at 05:23

## 2022-02-02 RX ADMIN — SEVELAMER CARBONATE 800 MG: 800 TABLET, FILM COATED ORAL at 11:16

## 2022-02-02 RX ADMIN — INSULIN LISPRO 1 UNITS: 100 INJECTION, SOLUTION INTRAVENOUS; SUBCUTANEOUS at 11:17

## 2022-02-02 RX ADMIN — Medication 10 ML: at 10:27

## 2022-02-02 RX ADMIN — PANTOPRAZOLE SODIUM 40 MG: 40 TABLET, DELAYED RELEASE ORAL at 08:29

## 2022-02-02 RX ADMIN — HYDROXYZINE PAMOATE 25 MG: 25 CAPSULE ORAL at 17:14

## 2022-02-02 RX ADMIN — INSULIN GLARGINE 2 UNITS: 100 INJECTION, SOLUTION SUBCUTANEOUS at 08:28

## 2022-02-02 RX ADMIN — CHOLESTYRAMINE 4 G: 4 POWDER, FOR SUSPENSION ORAL at 10:23

## 2022-02-02 RX ADMIN — HEPARIN SODIUM 5000 UNITS: 5000 INJECTION INTRAVENOUS; SUBCUTANEOUS at 14:04

## 2022-02-02 RX ADMIN — PREGABALIN 50 MG: 50 CAPSULE ORAL at 08:28

## 2022-02-02 RX ADMIN — HEPARIN SODIUM 5000 UNITS: 5000 INJECTION INTRAVENOUS; SUBCUTANEOUS at 21:38

## 2022-02-02 RX ADMIN — ERGOCALCIFEROL 50000 UNITS: 1.25 CAPSULE ORAL at 10:23

## 2022-02-02 RX ADMIN — SEVELAMER CARBONATE 800 MG: 800 TABLET, FILM COATED ORAL at 08:29

## 2022-02-02 RX ADMIN — DILTIAZEM HYDROCHLORIDE 90 MG: 30 TABLET, FILM COATED ORAL at 10:23

## 2022-02-02 RX ADMIN — HEPARIN SODIUM 5000 UNITS: 5000 INJECTION INTRAVENOUS; SUBCUTANEOUS at 05:23

## 2022-02-02 RX ADMIN — PANTOPRAZOLE SODIUM 40 MG: 40 TABLET, DELAYED RELEASE ORAL at 21:37

## 2022-02-02 RX ADMIN — INSULIN LISPRO 2 UNITS: 100 INJECTION, SOLUTION INTRAVENOUS; SUBCUTANEOUS at 05:23

## 2022-02-02 RX ADMIN — ACETAMINOPHEN 650 MG: 325 TABLET ORAL at 08:38

## 2022-02-02 RX ADMIN — ROPINIROLE HYDROCHLORIDE 0.25 MG: 0.25 TABLET, FILM COATED ORAL at 21:37

## 2022-02-02 RX ADMIN — PREGABALIN 50 MG: 50 CAPSULE ORAL at 21:37

## 2022-02-02 ASSESSMENT — PAIN DESCRIPTION - LOCATION
LOCATION: ABDOMEN
LOCATION: BUTTOCKS
LOCATION: ABDOMEN;BUTTOCKS
LOCATION: ABDOMEN

## 2022-02-02 ASSESSMENT — PAIN SCALES - GENERAL
PAINLEVEL_OUTOF10: 4
PAINLEVEL_OUTOF10: 8
PAINLEVEL_OUTOF10: 5
PAINLEVEL_OUTOF10: 7
PAINLEVEL_OUTOF10: 4

## 2022-02-02 ASSESSMENT — PAIN DESCRIPTION - DESCRIPTORS
DESCRIPTORS: BURNING;CONSTANT
DESCRIPTORS: SORE

## 2022-02-02 ASSESSMENT — PAIN DESCRIPTION - PAIN TYPE: TYPE: ACUTE PAIN

## 2022-02-02 NOTE — CONSULTS
[facility]) Pain Management  IP CONSULT NOTE       Patient: Theo Corona  : 1990  Date of Admission: 2022  8:38 AM  Date of Service: 2022  Reason for Consult:  Chest and abdominal pain    HISTORY OF PRESENT ILLNESS:    Maciej Lopez is a 36year-old female with a significant past medical history of ESRD on hemodialysis 3 days/wk on TTS schedule at [facility], Type I DM, poorly controlled with recurrent admissions for DKA, HTN, gastroparesis, ascites, infective endocarditis, cardiac arrest, hypothyroidism and depression. The patient presented to the ER on 22 for complaints of chest pain  and hypotension during dialysis. She was recently discharged on 22 after being admitted with DKA and was discharged to [place] [address] skilled rehab. The patient was seen today for reports of diffuse chronic abdominal pain and buttock pain due to skin chaffing from uncontrolled diarrhea. Pt describes the pain as constant, rated a 9/10 and not improved with lyrica addition from last hospitalization. Pt has previously tried gabapentin with no relief. Pt is requesting a one time dose of dilaudid IV as she reports this has helped best with her pain in the past. The patients extensive medical and surgical history can be reviewed below.      Past Medical History:       Diagnosis Date    Acute congestive heart failure ([address]     BC (acute kidney injury) ([address] 10/01/2019    Cardiac arrest ([address] 02/15/2021    Cephalgia 10/09/2019    Chronic kidney disease     Depression     Diabetes mellitus ([address]     Diabetic gastroparesis associated with type 1 diabetes mellitus ([address] 2018    Diabetic ketoacidosis ([address] 2011    Diabetic ketoacidosis with coma associated with type 1 diabetes mellitus ([address] 2013    Diabetic polyneuropathy associated with type 1 diabetes mellitus ([address] 2020    Drug use complicating pregnancy in third trimester     Endocarditis 10/31/2020    ESRD (end stage renal disease) ([facility] 2020    H/O cardiovascular stress test 2021    Lexiscan    Hemodialysis patient [facility])     History of blood transfusion 2019    Hyperlipidemia 10/08/2020    Hyperosmolar hyperglycemic state (HHS) ([facility] 2020    Hypothyroidism 10/08/2020    Iron deficiency anemia 10/01/2019    MDRO (multiple drug resistant organisms) resistance     MRSA (methicillin resistant Staphylococcus aureus)     back wound abcess    Non compliance w medication regimen 2016    Other disorders of kidney and ureter     Pregnancy 2016    16 weeks    Previous  delivery affecting pregnancy, antepartum 2017    Previous stillbirth or demise, antepartum 2016    Seizure ([facility] 2020    Severe pre-eclampsia in third trimester 2016    Shock liver 02/15/2021    Valvular endocarditis 11/10/2020    This Diagnosis was added to the Problem List based on transcribed orders from Dr. Johnathan Almonte           Past Surgical History:       Procedure Laterality Date    BACK SURGERY      abscess    CATHETER REMOVAL N/A 10/1/2021    PERITONEAL CATHETER REMOVAL performed by Dillon Antonio MD at [facility]      x2    [facility], LAPAROSCOPIC N/A 2019    CHOLECYSTECTOMY LAPAROSCOPIC performed by Perez Zaldivar MD at [address] [place] [address] 2018    COLONOSCOPY WITH BIOPSY performed by Didi Dockery MD at [address] [place] [address] 2018    COLONOSCOPY WITH BIOPSY performed by Remigio Johnson MD at . [place] [address]  2012    EF 57%    ECHO COMPL W DOP COLOR FLOW  6/10/2013         EMBOLECTOMY N/A 2020    ANGIOVAC, VEGECTOMY, YULISSA -- REQS ROOM 3 performed by Briseida Denise MD at [address] [place] [address] Left 2021    LEFT LEG INCISION AND DRAINAGE, DEBRIDEMENT, WOUND VAC APPLICATION performed by Wayne Garcia DPM at [address] [place] [address] Left 2021    LEFT LEG DEBRIDEMENT BIOPSY POSS APPLICATION WOUND VAC performed by Kate Vega DPM at [address] [facility] N/A 6/26/2020    LAPAROSCOPIC INSERTION PERITONEAL DIALYSIS CATHETER performed by Antonio Aguayo MD at [facility] ESOPHAGOGASTRODUODENOSCOPY TRANSORAL DIAGNOSTIC N/A 5/30/2018    EGD ESOPHAGOGASTRODUODENOSCOPY performed by Sofi Almodovar MD at [address]    TRANSESOPHAGEAL ECHOCARDIOGRAM N/A 10/19/2020    TRANSESOPHAGEAL ECHOCARDIOGRAM WITH BUBBLE STUDY performed by Sreedhar Henriquez MD at [address] [address]  04/2018    UPPER GASTROINTESTINAL ENDOSCOPY  12/18/2018    EGD BIOPSY performed by Flower Sandra MD at [address] 10/11/2019    EGD ESOPHAGOGASTRODUODENOSCOPY performed by Meri Velazquez DO at [address] [facility] N/A 7/14/2021    EGD BIOPSY performed by Sam Wagner MD at [facility]:  Allergies   Allergen Reactions    Cefepime Other (See Comments)     \" neurological side effect- slurred speech and confusion    Toradol [Ketorolac Tromethamine] Anaphylaxis and Hives     LABS:   Recent Results (from the past 72 hour(s))   EKG 12 Lead    Collection Time: 02/01/22  8:48 AM   Result Value Ref Range    Ventricular Rate 87 BPM    Atrial Rate 87 BPM    P-R Interval 126 ms    QRS Duration 82 ms    Q-T Interval 390 ms    QTc Calculation (Bazett) 469 ms    P Axis 29 degrees    R Axis 44 degrees    T Axis 48 degrees   CBC Auto Differential    Collection Time: 02/01/22  9:25 AM   Result Value Ref Range    WBC 10.3 4.5 - 11.5 E9/L    RBC 3.29 (L) 3.50 - 5.50 E12/L    Hemoglobin 8.7 (L) 11.5 - 15.5 g/dL    Hematocrit 27.3 (L) 34.0 - 48.0 %    MCV 83.0 80.0 - 99.9 fL    MCH 26.4 26.0 - 35.0 pg    MCHC 31.9 (L) 32.0 - 34.5 %    RDW 22.7 (H) 11.5 - 15.0 fL    Platelets 741 (L) 018 - 450 E9/L    MPV NOT CALC 7.0 - 12.0 fL    Neutrophils % 75.2 43.0 - 80.0 %    Lymphocytes % 20.4 20.0 - 42.0 % Monocytes % 1.8 (L) 2.0 - 12.0 %    Eosinophils % 0.9 0.0 - 6.0 %    Basophils % 1.8 0.0 - 2.0 %    Neutrophils Absolute 7.73 (H) 1.80 - 7.30 E9/L    Lymphocytes Absolute 2.06 1.50 - 4.00 E9/L    Monocytes Absolute 0.21 0.10 - 0.95 E9/L    Eosinophils Absolute 0.09 0.05 - 0.50 E9/L    Basophils Absolute 0.19 0.00 - 0.20 E9/L    Anisocytosis 3+     Polychromasia 1+     Poikilocytes 2+     [place] Cells 1+     Target Cells 1+    Lipase    Collection Time: 02/01/22  9:25 AM   Result Value Ref Range    Lipase 8 (L) 13 - 60 U/L   Troponin    Collection Time: 02/01/22  9:25 AM   Result Value Ref Range    Troponin, High Sensitivity 146 (H) 0 - 9 ng/L   Brain Natriuretic Peptide    Collection Time: 02/01/22  9:25 AM   Result Value Ref Range    Pro-BNP >70,000 (H) 0 - 125 pg/mL   Lactic Acid, Plasma    Collection Time: 02/01/22  9:25 AM   Result Value Ref Range    Lactic Acid 2.1 0.5 - 2.2 mmol/L   Basic Metabolic Panel w/ Reflex to MG    Collection Time: 02/01/22  9:25 AM   Result Value Ref Range    Sodium 140 132 - 146 mmol/L    Potassium reflex Magnesium 5.1 (H) 3.5 - 5.0 mmol/L    Chloride 107 98 - 107 mmol/L    CO2 22 22 - 29 mmol/L    Anion Gap 11 7 - 16 mmol/L    Glucose 63 (L) 74 - 99 mg/dL    BUN 22 (H) 6 - 20 mg/dL    CREATININE 4.1 (H) 0.5 - 1.0 mg/dL    GFR Non-African American 15 >=60 mL/min/1.73    GFR African American 15     Calcium 8.4 (L) 8.6 - 10.2 mg/dL   Hepatic Function Panel    Collection Time: 02/01/22  9:25 AM   Result Value Ref Range    Total Protein 6.2 (L) 6.4 - 8.3 g/dL    Albumin 2.7 (L) 3.5 - 5.2 g/dL    Alkaline Phosphatase 241 (H) 35 - 104 U/L    ALT 11 0 - 32 U/L    AST 15 0 - 31 U/L    Total Bilirubin 0.4 0.0 - 1.2 mg/dL    Bilirubin, Direct <0.2 0.0 - 0.3 mg/dL    Bilirubin, Indirect see below 0.0 - 1.0 mg/dL   pH, venous    Collection Time: 02/01/22  9:25 AM   Result Value Ref Range    pH, Khurram 7.25 (L) 7.35 - 7.45   Beta-Hydroxybutyrate    Collection Time: 02/01/22  9:25 AM   Result Value Ref Range    Beta-Hydroxybutyrate 0.09 0.02 - 0.27 mmol/L   HCG, SERUM, QUALITATIVE    Collection Time: 02/01/22  9:25 AM   Result Value Ref Range    hCG Qual NEGATIVE NEGATIVE   POCT Glucose    Collection Time: 02/01/22 10:15 AM   Result Value Ref Range    Meter Glucose 41 (L) 74 - 99 mg/dL   POCT Glucose    Collection Time: 02/01/22 10:46 AM   Result Value Ref Range    Meter Glucose 132 (H) 74 - 99 mg/dL   Troponin    Collection Time: 02/01/22 11:50 AM   Result Value Ref Range    Troponin, High Sensitivity 132 (H) 0 - 9 ng/L   Vitamin D 25 Hydroxy    Collection Time: 02/01/22 11:50 AM   Result Value Ref Range    Vit D, 25-Hydroxy 10 (L) 30 - 100 ng/mL   POCT Glucose    Collection Time: 02/01/22  5:41 PM   Result Value Ref Range    Meter Glucose 266 (H) 74 - 99 mg/dL   POCT Glucose    Collection Time: 02/01/22 10:16 PM   Result Value Ref Range    Meter Glucose 276 (H) 74 - 99 mg/dL   POCT Glucose    Collection Time: 02/02/22  5:22 AM   Result Value Ref Range    Meter Glucose 312 (H) 74 - 99 mg/dL   EKG 12 Lead    Collection Time: 02/02/22  9:15 AM   Result Value Ref Range    Ventricular Rate 106 BPM    Atrial Rate 106 BPM    P-R Interval 126 ms    QRS Duration 82 ms    Q-T Interval 326 ms    QTc Calculation (Bazett) 433 ms    P Axis 43 degrees    R Axis 32 degrees    T Axis 83 degrees   COVID-19, Rapid    Collection Time: 02/02/22  9:25 AM    Specimen: Nasopharyngeal Swab   Result Value Ref Range    SARS-CoV-2, NAAT Not Detected Not Detected   SPECIMEN REJECTION    Collection Time: 02/02/22  9:37 AM   Result Value Ref Range    Rejected Test CDIFF     Reason for Rejection see below    POCT Glucose    Collection Time: 02/02/22 11:06 AM   Result Value Ref Range    Meter Glucose 242 (H) 74 - 99 mg/dL       IMAGING: XR CHEST PORTABLE    Result Date: 2/1/2022  1. Hazy bilateral perihilar airspace disease favored to represent volume overload.  2. Patchy opacities within the lung bases which could represent atelectasis or pneumonia. REVIEW OF SYSTEMS:   Gen: Negative for nausea, vomiting,fever, chills, night sweats  HEENT: Negative for double vision, blurred vision, sore throat   Heart: Negative for HTN, palpitations  Lungs: Negative for wheezes, asthma or SOB  GI: Negative for nausea, vomiting  : Negative for dysuria, hematuria  Heme: Negative for DVT or bleeding disorders  Ortho: Negative for pain in the joints, arthritis or gout      PHYSICAL EXAMINATION:   General appearance: in moderate distress while lying bed, alert and oriented, lethargic but responsive  Build: normal  Function: unable to assess at this time. HEENT:   Head: normocephalic and atraumatic   Pupils: regular, round and equal.   Sclera: icterus present,   Lungs:   No respiratory distress. Symmetrical expansion. Abdomen:   soft some tenderness in all four quadrants, distention,  positive bowel sounds.    Extremities:   Tremors: no   Intact:Yes   Varicose veins: absent   Pulses:popliteal pulses 2+ bilaterally   Cyanosis:none   Edema: none  Neurological:   Gait: unable to assess   States sensation intact   Dermatology:   Skin:no unusual rashes, no skin lesions, no palpable subcutaneous nodules and good skin turgor  RIJ tunneled dialysis catheter    Inpatient Medication regimen:   Current Facility-Administered Medications   Medication Dose Route Frequency Provider Last Rate Last Admin    zinc oxide 40 % paste   Topical 4x Daily PRN Jacinda Kamila, DO        ARIPiprazole (ABILIFY) tablet 5 mg  5 mg Oral Nightly Evin Lopez, DO        cholestyramine (QUESTRAN) packet 4 g  1 packet Oral BID Evin Lopez, DO   4 g at 02/02/22 1023    dilTIAZem (CARDIZEM) tablet 90 mg  90 mg Oral 4x Daily Evin Lopez, DO   90 mg at 02/02/22 1023    escitalopram (LEXAPRO) tablet 5 mg  5 mg Oral Daily Evin Lopez, DO   5 mg at 02/02/22 1023    hydrOXYzine (VISTARIL) capsule 25 mg  25 mg Oral TID PRN Evin Lopez, DO        insulin glargine (LANTUS) injection vial 2 Units  2 Units SubCUTAneous QAM Evin Lopez, DO   2 Units at 02/02/22 8326    insulin lispro (HUMALOG) injection vial 0-3 Units  0-3 Units SubCUTAneous 4x Daily AC & HS Evin Lopez, DO   1 Units at 02/02/22 1117    glucose (GLUTOSE) 40 % oral gel 15 g  15 g Oral PRN Evin Lopez, DO        dextrose 50 % IV solution  12.5 g IntraVENous PRN Evin Lopez, DO        glucagon (rDNA) injection 1 mg  1 mg IntraMUSCular PRN Evin Lopez, DO        dextrose 5 % solution  100 mL/hr IntraVENous PRN Evin Lopez, DO        levothyroxine (SYNTHROID) tablet 75 mcg  75 mcg Oral Daily Evin Lopez, DO   75 mcg at 02/02/22 0523    mirtazapine (REMERON) tablet 15 mg  15 mg Oral Nightly Evin Lopez, DO   15 mg at 02/01/22 2217    pantoprazole (PROTONIX) tablet 40 mg  40 mg Oral BID Evin Lopez, DO   40 mg at 02/02/22 0829    pregabalin (LYRICA) capsule 50 mg  50 mg Oral BID Evin Lopez, DO   50 mg at 02/02/22 4479    rOPINIRole (REQUIP) tablet 0.25 mg  0.25 mg Oral Nightly Evin Lopez, DO   0.25 mg at 02/01/22 2218    sevelamer (RENVELA) tablet 800 mg  800 mg Oral TID WC Evin Lopez, DO   800 mg at 02/02/22 1116    sodium chloride flush 0.9 % injection 5-40 mL  5-40 mL IntraVENous 2 times per day [employer], DO   10 mL at 02/02/22 1027    sodium chloride flush 0.9 % injection 5-40 mL  5-40 mL IntraVENous PRN Evin Lopez, DO        0.9 % sodium chloride infusion  25 mL IntraVENous PRN Evin Lopez, DO        ondansetron (ZOFRAN-ODT) disintegrating tablet 4 mg  4 mg Oral Q8H PRN Evin Jessica, DO        Or    ondansetron (ZOFRAN) injection 4 mg  4 mg IntraVENous Q6H PRN Evincatherine Lopez, DO        polyethylene glycol (GLYCOLAX) packet 17 g  17 g Oral Daily PRN Evin Lopez DO        acetaminophen (TYLENOL) tablet 650 mg  650 mg Oral Q6H PRN Evin Lopez DO   650 mg at 02/02/22 0527    Or    acetaminophen (TYLENOL) suppository 650 mg  650 mg Rectal Q6H PRN Evin Lopez,         heparin (porcine) injection 5,000 Units  5,000 Units SubCUTAneous 3 times per day Jeff Hall DO   5,000 Units at 02/02/22 3754    epoetin nena-epbx (RETACRIT) injection 5,000 Units  5,000 Units SubCUTAneous Once per day on Mon Wed Fri Lovella Castleman, APRN - CNP        vitamin D (ERGOCALCIFEROL) capsule 50,000 Units  50,000 Units Oral Weekly Lovella Castleman, APRN - CNP   50,000 Units at 02/02/22 1023    albuterol (PROVENTIL) nebulizer solution 2.5 mg  2.5 mg Nebulization Q6H PRN Jeff Hall DO           Impression:  Atypical chest pain  Chronic Abdominal pain   Uncontrolled diarrhea ruling out Cdiff  Hx of type 1 DM on HD  Hx of cardiac arrest 2021  Hx of seizure  Recent COVID infection  Previously tried gabapentin with no relief      Recommendation for plan of care:  1. Trial increase of Lyrica to 50mg po tid, may consider Lyrica 75mg po bid but unclear if 75mg dosing caused gastroparesis flare in the past  2. No narcotics as above as it can worsen gastroparesis per previous notes. 3. Continue with tylenol 650mg po q 6 hours prn pain  4. Not a candidate for cymbalta as she is currently on lexapro and abilify    This case was discussed with Dr. Fields Matters      Thank you for the referral.   Teresa Gómez, APRN - [address] [facility]        Participated in treatment plan and agree with above. Called to check on patient 02/02 around 10:30 PM and per nurse in charge, patient was doing better with no pain.     Margoth Swanson M.D.

## 2022-02-02 NOTE — FLOWSHEET NOTE
02/01/22 2125   During Hemodialysis Assessment   /73   Pulse 94   Blood Flow Rate (ml/min) 350 ml/min   Ultrafiltration Rate (ml/hr) 410 ml/hr   Temp 96.4 °F (35.8 °C)   Arterial Pressure (mmHg) -120 mmHg   Venous Pressure (mmHg) 80   TMP 60   Hemodialysis Conductivity 13.2      Comments treatment end at this time. patient for 2 hour treatment with a 2 potassium, 2 calcium bath. Patient had a blood volume proccess of 38L and a 500ml fluid removal. patient had gradual decrease of bp to 80's / 40's though platued and thus tolerated this tretament. patient bp at treatment end and post treatment in 100's/ 70's. report of treatment details given to rosi cordero at this time.    Access Visible Yes

## 2022-02-02 NOTE — PLAN OF CARE
Patient's chart updated to reflect:      . -HF discharge instructions.       Dietrich Scheuermann, RN   Heart Failure Navigator

## 2022-02-02 NOTE — PROGRESS NOTES
[address] [facility]  [facility]         Date:2022                                                   Patient Name: Umu Cutler     MRN: [id number]     : 1990     Room: [facility]/0562-82       Evaluating OT: Marcy Sox, OTR/L; [id number]       Referring Provider and Orders/Date:   OT eval and treat Start: 22, End: 22, ONE TIME, Standing Count: 1 Occurrences, R    Evin Lopez DO     Diagnosis:   1. Metabolic acidosis    2. Hypoglycemia    3.  Hypervolemia, unspecified hypervolemia type         Pertinent Medical History:        Past Medical History:   Diagnosis Date    Acute congestive heart failure ([address]     BC (acute kidney injury) ([address] 10/01/2019    Cardiac arrest ([address] 02/15/2021    Cephalgia 10/09/2019    Chronic kidney disease     Depression     Diabetes mellitus ([address]     Diabetic gastroparesis associated with type 1 diabetes mellitus ([address] 2018    Diabetic ketoacidosis ([address] 2011    Diabetic ketoacidosis with coma associated with type 1 diabetes mellitus ([address] 2013    Diabetic polyneuropathy associated with type 1 diabetes mellitus ([address] 2020    Drug use complicating pregnancy in third trimester     Endocarditis 10/31/2020    ESRD (end stage renal disease) ([address] 2020    H/O cardiovascular stress test 2021    Lexiscan    Hemodialysis patient [facility])     History of blood transfusion 2019    Hyperlipidemia 10/08/2020    Hyperosmolar hyperglycemic state (HHS) ([address] 2020    Hypothyroidism 10/08/2020    Iron deficiency anemia 10/01/2019    MDRO (multiple drug resistant organisms) resistance     MRSA (methicillin resistant Staphylococcus aureus)     back wound abcess    Non compliance w medication regimen 2016    Other disorders of kidney and ureter     Pregnancy 2016    16 weeks    Previous  delivery affecting pregnancy, antepartum 2017    Previous stillbirth or demise, antepartum 2016    Seizure ([address] 2020    Severe pre-eclampsia in third trimester 2016    Shock liver 02/15/2021    Valvular endocarditis 11/10/2020    This Diagnosis was added to the Problem List based on transcribed orders from Dr. Bora Maria             Past Surgical History:   Procedure Laterality Date    BACK SURGERY      abscess    CATHETER REMOVAL N/A 10/1/2021    PERITONEAL CATHETER REMOVAL performed by Christa Farias MD at [facility] 49      x2   [facility], LAPAROSCOPIC N/A 2019    CHOLECYSTECTOMY LAPAROSCOPIC performed by Purnima Gabriel MD at [address] [facility] N/A 2018    COLONOSCOPY WITH BIOPSY performed by Aimee Turner MD at [address] N/A 2018    COLONOSCOPY WITH BIOPSY performed by Saeid Gerber MD at [address]  2012    EF 57%    ECHO COMPL W DOP COLOR FLOW  6/10/2013         EMBOLECTOMY N/A 2020    [address] [place] [address], [id number] [place] [address], YULISSA -- REQS ROOM 3 performed by Xu Galarza MD at [address] [place] [address] Left 2021    LEFT LEG INCISION AND DRAINAGE, DEBRIDEMENT, WOUND VAC APPLICATION performed by Alejandra Burnett DPM at [address] [place] [address] Left 2021    LEFT LEG DEBRIDEMENT BIOPSY POSS APPLICATION WOUND VAC performed by Alejandra Burnett DPM at [address] [facility] N/A 2020    LAPAROSCOPIC INSERTION PERITONEAL DIALYSIS CATHETER performed by Albert Merino MD at [facility] 80 ESOPHAGOGASTRODUODENOSCOPY TRANSORAL DIAGNOSTIC N/A 2018    EGD ESOPHAGOGASTRODUODENOSCOPY performed by Aimee Turner MD at [address] [facility] TRANSESOPHAGEAL ECHOCARDIOGRAM N/A 10/19/2020    TRANSESOPHAGEAL ECHOCARDIOGRAM WITH BUBBLE STUDY performed by Mariola Dejesus MD at [address] [facility]  2018  UPPER GASTROINTESTINAL ENDOSCOPY  12/18/2018    EGD BIOPSY performed by Sarita Cranker, MD at [address] [place] [address] 10/11/2019    EGD ESOPHAGOGASTRODUODENOSCOPY performed by Lilo Bernstein DO at [address] [facility] N/A 7/14/2021    EGD BIOPSY performed by Manohar Sotomayor MD at [facility] ENDOSCOPY       Precautions:  Fall Risk, cdiff rule out    Recommended placement: subacute     Assessment of current deficits     [x] Functional mobility  [x]ADLs  [x] Strength               []Cognition     [x] Functional transfers   [x] IADLs         [x] Safety Awareness   [x]Endurance     [] Fine Coordination              [x] Balance      [] Vision/perception   []Sensation      [x]Gross Motor Coordination  [] ROM  [] Delirium                   [] Motor Control     OT PLAN OF CARE   OT POC based on physician orders, patient diagnosis and results of clinical assessment    Frequency/Duration 1-3 days/wk for 2 weeks PRN   Specific OT Treatment Interventions to include:   * Instruction/training on adapted ADL techniques and AE recommendations to increase functional independence within precautions       * Training on energy conservation strategies, correct breathing pattern and techniques to improve independence/tolerance for self-care routine  * Functional transfer/mobility training/DME recommendations for increased independence, safety, and fall prevention  * Patient/Family education to increase follow through with safety techniques and functional independence  * Recommendation of environmental modifications for increased safety with functional transfers/mobility and ADLs  * Therapeutic exercise to improve motor endurance, ROM, and functional strength for ADLs/functional transfers  * Therapeutic activities to facilitate/challenge dynamic balance, stand tolerance for increased safety and independence with ADLs  * Therapeutic activities to facilitate gross/fine motor skills for increased independence with ADLs  * Neuro-muscular re-education: facilitation of righting/equilibrium reactions, midline orientation, scapular stability/mobility, normalization of muscle tone, and facilitation of volitional active controled movement  * Positioning to improve skin integrity, interaction with environment and functional independence     Recommended Adaptive Equipment/DME:  TBD      Home Living: Patient lives with mother and her 2 children (3and 11years old) in a two story home resides first with 2 steps  to enter with Rail. Mother works during the day. Shower in basement with standard flight of steps with rail. Bathroom setup: tub shower in basement-been sponge bathing    DME owned: Wheelchair, Bedside commode and Shower chair    Prior Level of Function: pt stated that she has been bedbound for past 6 months, her mother assists her with all  ADLs , and with IADLs. Pt reported she does not ambulate.    Driving: no  Occupation/leisure: likes to draw, cook    Pain Level: abdominals/buttocks 9/10; confusion with pain medications at time of eval.   Cognition: A&O: 4/4; Follows 3 step directions   Memory:  Intact   Sequencing:  Intact   Problem solving:  fair   Judgement/safety:  fair    AM-PAC Daily Activity Inpatient   How much help for putting on and taking off regular lower body clothing?: Total  How much help for Bathing?: A Lot  How much help for Toileting?: Total  How much help for putting on and taking off regular upper body clothing?: A Lot  How much help for taking care of personal grooming?: A Little  How much help for eating meals?: None  AM-[facility] Inpatient Daily Activity Raw Score: 13  AM-PAC Inpatient ADL T-Scale Score : 32.03  ADL Inpatient CMS 0-100% Score: 63.03  ADL Inpatient CMS G-Code Modifier : CL     Functional Assessment:     Initial Eval Status  Date: 2/2/2022   Treatment Status  Date: STGs = LTGs  Time frame: 10-14 days   Feeding Stand by Assist with drinks and overall fatigue with completion. NA-PLOF   Grooming Stand by Assist with face, oral care and hand/face wash from supine in bed  Independent    UB Dressing Maximal Assist with gown management due to discomfort around her neck  Minimal Assist    LB Dressing Dependent suspected due to overall fatigue. Pt already dressed and declining to doff socks reporting, \"I have been having help with that\". Maximal Assist    Bathing Maximal Assist with bathing from supine suspected, pt declining due to fatigue on eval.   Moderate Assist    Toileting Dependent from supine level with bed pan recommended. Maximal Assist    Bed Mobility  Sit to supine: Moderate Assist  For LB management. Mod A for positioning in bed for pressure relief with use of pillow   Supine to sit: min A  Sit to supine: Stand by Assist    Functional Transfers  NT due to pt overall debility, decreased activity tolerance, balance deficits, safety and fall risk. Maximal Assist    Functional Mobility  Not appropriate at time of eval. To re-assess at at later time if appropriate. Not Appropriate-PLOF      Balance Sitting:     Static:  fair    Dynamic:fair-  Standing: NT  Sitting:     Static:  Fair+    Dynamic:fair+  Standing: poor   Activity Tolerance Vitals with activity:room air  Fair- tolerance with overall fatigue reported. Limited to sitting EOB with this therapist for <5min, but longer during PT session  Increase sitting tolerance for >30min with stable vital signs for carry over into toileting, functional tranfers and indep in ADLs   Visual/  Perceptual Glasses: not Present; diabetic retinopathy     Reports change in vision since admission: No     NA   Lucien UE Strengthening  4-/5 generally  4+/5MMT generally for carry over into self care, functional transfers and functional mobility with AD.       Hand Dominance  [] Right  [x] Left    AROM (PROM) Strength Additional Info:    RUE  WFL 4-/5 good  and  FMC/dexterity noted during ADL tasks  Opposition [x] Intact [] Impaired  Finger to nose [x] Intact [] Impaired     LUE WFL 4-/5 good  and FMC/dexterity noted during ADL tasks  Opposition [x] Intact [] Impaired  Finger to nose [x] Intact [] Impaired     Hearing: WFL   Sensation:  No c/o numbness or tingling   Tone: WFL   Edema: none    Comments: Upon arrival patient sitting EOB with PT. Pt required max A for most UB ADLs and depA LB ADLs tasks. The biggest barriers reflect that of functional transfers, functional mobility, UB/LB ADLs, cognition, activity tolerance, balance, safety and strengthening. At end of session, patient supine with call light and phone within reach, all lines and tubes intact. Overall patient demonstrated decreased independence and safety during completion of ADL/functional transfer/mobility tasks compared to PLOF. Nursing updated on pt position and status following OT eval. Pt would benefit from continued skilled OT to increase safety and independence with completion of ADL/IADL tasks for functional independence and quality of life. Treatment: OT treatment provided this date includes:   Instruction, education and training on safe facilitation and adapted techniques for completion of ADLs. These include neuromuscular reeducation to facilitate balance/righting reactions and on energy conservation/work simplification for completion of ADLs. Education provided on hand/feet placement with bed rails and body mechanics for fall prevention and pressure relief due to wound and pain on buttocks. Cues for energy conservation and safety for in the home at [place], including modifications and DME. Extended time to complete all tasks, including skilled monitoring of patient's response during treatment session and vital signs. Prior to and at the end of session, environmental modifications / line management completed for patients safety and efficiency of treatment session. See above for further details.     Rehab Potential: Good for established goals     Patient / Family Goal: Pain management      Patient and/or family were instructed on functional diagnosis, prognosis/goals and OT plan of care. Demonstrated fair understanding. Eval Complexity: Low  · History: Brief review of medical records and additional review of physical, cognitive, or psychosocial history related to current functional performance  · Exam: 3+ performance deficits  · Assistance/Modification: Mod assistance or modifications required to perform tasks. May have comorbidities that affect occupational performance. Time In: 1428  Time Out: 1500  Total Treatment Time: 12    Min Units   OT Eval Low 97165  x  1   OT Eval Medium 22130      OT Eval High 77738      OT Re-Eval [id number]       Therapeutic Ex 65034       Therapeutic Activities 52343  12 1    ADL/Self Care 40395       Orthotic Management 72347       Manual [id number]     Neuro Re-Ed 82377       Non-Billable Time          Evaluation Time additionally includes thorough review of current medical information, gathering information on past medical history/social history and prior level of function, interpretation of standardized testing/informal observation of tasks, assessment of data and development of plan of care and goals.             Michelle Gallegos OTR/L; [id number]

## 2022-02-02 NOTE — PROGRESS NOTES
Physical Therapy    Physical Therapy Initial Evaluation/Plan of Care    Room #:  6953/2521-57  Patient Name: Manjinder Lewis  YOB: 1990  MRN: [id number]    Date of Service: 2/2/2022     Tentative placement recommendation: Subacute rehab  Equipment recommendation:  To be determined      Evaluating Physical Therapist: Tiffani Conti, PT  #[id number]      Specific Provider Orders/Date/Referring Provider :  02/02/22 0915   PT eval and treat Start: 02/02/22 0915, End: 02/02/22 0915, ONE TIME, Standing Count: 1 Occurrences, R    Carlyon Rise, DO      Admitting Diagnosis:   Metabolic acidosis [M24.0]  Hypoglycemia [E16.2]  Volume overload [E87.70]  Hypervolemia, unspecified hypervolemia type [E87.70]    Admitted with    chest pain and hypotension during dialysis    Surgery: none  Visit Diagnoses       Codes    Metabolic acidosis    -  Primary E87.2    Hypoglycemia     E16.2          Patient Active Problem List   Diagnosis    Non compliance w medication regimen    Tobacco smoking complicating pregnancy    MTHFR mutation    Generalized abdominal pain    Diabetic ketoacidosis without coma associated with type 1 diabetes mellitus ([facility]    Hypertension    Prolonged QT interval    Iron deficiency anemia    Sinus tachycardia    Bladder dysfunction    Hypokalemia    Severe protein-calorie malnutrition (HCC)    Uncontrolled type 1 diabetes mellitus with hyperglycemia (HCC)    End stage renal disease (HCC)    Hypothyroidism    Hyperlipidemia    Acute cystitis    Nondisplaced fracture of neck of fifth metacarpal bone, left hand, initial encounter for closed fracture    Chronic right-sided low back pain    Endocarditis    Seizure (HCC)    Hyperkalemia    Hypomagnesemia    Hypocalcemia    Intractable nausea and vomiting    Wound of left leg, sequela    Syncope    Type 1 diabetes mellitus without complication (HCC)    Hyperosmolality    Major depressive disorder    Lactic acid acidosis  Early satiety    Acute on chronic systolic CHF (congestive heart failure) (HCC)    Other chest pain    Chronic renal failure syndrome    Septic shock (HCC)    ESRD (end stage renal disease) [facility]    Hyperosmolar hyperglycemic state (HHS) ([facility]    Hypertensive urgency    Nausea    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) ([facility]    ESRD (end stage renal disease) on dialysis ([address]    AMS (altered mental status)    Opioid overdose ([facility]    Anemia    Type 1 diabetes mellitus with chronic kidney disease on chronic dialysis (HCC)    Elevated TSH    Diabetic acidosis without coma ([facility]    DKA, type 1, not at goal [facility])    COVID-19 virus infection    Frequent hospital admissions    Diabetic gastroparesis ([facility]    Diffuse pain    Poor prognosis    Physical deconditioning    Declining functional status    Lack of motivation    Drug-seeking behavior    Behavior problem, adult    Volume overload        ASSESSMENT of Current Deficits Patient exhibits decreased strength, balance, endurance, coordination and pain lower abdomen impairing functional mobility, transfers, gait , gait distance and tolerance to activity are barriers to d/c and require skilled intervention during hospital stay to attain pre hospital level of function. Decreased strength, balance and endurance  increases patient's risk for fall.   Poor ability to utilize bilateral lower extremities and trunk for trf and gait       PHYSICAL THERAPY  PLAN OF CARE       Physical therapy plan of care is established based on physician order,  patient diagnosis and clinical assessment    Current Treatment Recommendations:    -Bed Mobility: Lower extremity exercises , Upper extremity exercises  and Trunk control activities   -Sitting Balance: Incorporate reaching activities to activate trunk muscles , Hands on support to maintain midline , Facilitate active trunk muscle engagement  and Facilitate postural control in all planes   -Standing Balance: Perform strengthening exercises in standing to promote motor control with or without upper extremity support , Instruct patient on adequate base of support to maintain balance and Challenge balance utilizing reaching  activities beyond center of gravity    -Transfers: Provide instruction on proper hand and foot position for adequate transfer of weight onto lower extremities and use of gait device if needed, Cues for hand placement, technique and safety. Provide stabilization to prevent fall  and Facilitate weight shift forward on to lower extremities and provide necessary stabilization of bilateral lower extremities   -Gait: Gait training, Standing activities to improve: base of support, weight shift, weight bearing , Exercises to improve trunk control and Exercises to improve hip and knee control   -Endurance: Utilize Supervised activities to increase level of endurance to allow for safe functional mobility including transfers and gait     PT long term treatment goals are located in below grid    Patient and or family understand(s) diagnosis, prognosis, and plan of care. Frequency of treatments: Patient will be seen  3-5 times/week.          Prior Level of Function: Patient ambulated independently and with standard walker    Rehab Potential: good    for baseline    Past medical history:   Past Medical History:   Diagnosis Date    Acute congestive heart failure ([address]     BC (acute kidney injury) ([facility] 10/01/2019    Cardiac arrest ([facility] 02/15/2021    Cephalgia 10/09/2019    Chronic kidney disease     Depression     Diabetes mellitus ([address]     Diabetic gastroparesis associated with type 1 diabetes mellitus ([address] 12/17/2018    Diabetic ketoacidosis ([facility] 08/27/2011    Diabetic ketoacidosis with coma associated with type 1 diabetes mellitus ([address] 06/26/2013    Diabetic polyneuropathy associated with type 1 diabetes mellitus ([facility] 12/27/2020    Drug use complicating pregnancy in third trimester     Endocarditis 10/31/2020    ESRD (end stage renal disease) ([email or web address] 2020    H/O cardiovascular stress test 2021    Lexiscan    Hemodialysis patient [facility])     History of blood transfusion 2019    Hyperlipidemia 10/08/2020    Hyperosmolar hyperglycemic state (HHS) ([facility] 2020    Hypothyroidism 10/08/2020    Iron deficiency anemia 10/01/2019    MDRO (multiple drug resistant organisms) resistance     MRSA (methicillin resistant Staphylococcus aureus)     back wound abcess    Non compliance w medication regimen 2016    Other disorders of kidney and ureter     Pregnancy 2016    16 weeks    Previous  delivery affecting pregnancy, antepartum 2017    Previous stillbirth or demise, antepartum 2016    Seizure ([email or web address] 2020    Severe pre-eclampsia in third trimester 2016    Shock liver 02/15/2021    Valvular endocarditis 11/10/2020    This Diagnosis was added to the Problem List based on transcribed orders from Dr. Rj Blanco        Past Surgical History:   Procedure Laterality Date    BACK SURGERY      abscess    CATHETER REMOVAL N/A 10/1/2021    PERITONEAL CATHETER REMOVAL performed by Nelson Soto MD at [address]      x2   Odessia Bon, LAPAROSCOPIC N/A 2019    CHOLECYSTECTOMY LAPAROSCOPIC performed by Pablito Bishop MD at [address] N/A 2018    COLONOSCOPY WITH BIOPSY performed by Vincenzo Rutherford MD at [facility] N/A 2018    COLONOSCOPY WITH BIOPSY performed by Otilia Cardona MD at [address] [facility] ECHO COMPL [address]  2012    EF 57%    ECHO COMPL W DOP COLOR FLOW  6/10/2013         EMBOLECTOMY N/A 2020    ANGIOVAC, Adan Silva, YULISSA -- REQS ROOM 3 performed by Kennon Kocher, MD at [address] Left 2021    LEFT LEG INCISION AND DRAINAGE, DEBRIDEMENT, WOUND VAC APPLICATION performed by Costa Núñez DPM at [address] Left 5/18/2021    LEFT LEG DEBRIDEMENT BIOPSY POSS APPLICATION WOUND VAC performed by Costa Johnson DPM at [place] [address] N/A 6/26/2020    LAPAROSCOPIC INSERTION PERITONEAL DIALYSIS CATHETER performed by Lyric Samuels MD at [facility] ESOPHAGOGASTRODUODENOSCOPY TRANSORAL DIAGNOSTIC N/A 5/30/2018    EGD ESOPHAGOGASTRODUODENOSCOPY performed by Velma Saab MD at [address] [place] [address] TRANSESOPHAGEAL ECHOCARDIOGRAM N/A 10/19/2020    TRANSESOPHAGEAL ECHOCARDIOGRAM WITH BUBBLE STUDY performed by Michel Ventura MD at [address] [place] [address] TUNNELED VENOUS PORT PLACEMENT  04/2018    UPPER GASTROINTESTINAL ENDOSCOPY  12/18/2018    EGD BIOPSY performed by Jax Galarza MD at [place] [address] 10/11/2019    EGD ESOPHAGOGASTRODUODENOSCOPY performed by Christina Iverson DO at [place] [address] N/A 7/14/2021    EGD BIOPSY performed by Rachlele Abreu MD at [address] [place] [address]:    Precautions:  Up with assistance, falls, alarm, contact isolation and cdiff rule out ,    Social history: Patient lives with mother and her 2 children (3and 11years old)  in a two story home resides first  with 2 steps  to enter with Rail   ; plan is to ramp home    Equipment owned: Wheelchair, Bedside commode and Shower chair,  Std walker    [id number] [facility] Mobility Inpatient   How much difficulty turning over in bed?: A Little  How much difficulty sitting down on / standing up from a chair with arms?: A Lot  How much difficulty moving from lying on back to sitting on side of bed?: A Lot  How much help from another person moving to and from a bed to a chair?: A Lot  How much help from another person needed to walk in hospital room?: Total  How much help from another person for climbing 3-5 steps with a railing?: Total  AM-PAC Inpatient Mobility Raw Score : 11  AM-PAC Inpatient T-Scale Score : 33.86  Mobility Inpatient CMS 0-100% Score: 72.57  Mobility Inpatient CMS G-Code Modifier : CL    Nursing cleared patient for PT evaluation. The admitting diagnosis and active problem list as listed above have been reviewed prior to the initiation of this evaluation. OBJECTIVE;   Initial Evaluation  Date: 2/22/2022 Treatment Date:     Short Term/ Long Term   Goals   Was pt agreeable to Eval/treatment? Yes  To be met in 5 days   Pain level   8/10  abdomen     Bed Mobility    Rolling:  Moderate assist of 1    Supine to sit: Maximal assist of 1    Sit to supine: Maximal assist of 1    Scooting: Maximal assist of 1    Rolling: Supervision     Supine to sit: Supervision     Sit to supine: Supervision     Scooting: Supervision      Transfers Sit to stand: Maximal assist of 1 for anterior wt shift and transition onto bilateral lower extremities with assist for extenstion of trunk/hips and knees  Sit to stand: Minimal assist of 1     Ambulation    not assessed    50 feet using  least restrictive device with Minimal assist of 1    Stair negotiation: ascended and descended   Not assessed     2 steps with rail min a   ROM Within functional limits    Increase range of motion 10% of affected joints    Strength BUE:  3-/5  RLE:  2+/5  LLE:  2+/5  Increase strength in affected mm groups by 1/3 grade   Balance Sitting EOB:  fair minus  Dynamic Standing:  poor    Sitting EOB:  good    Dynamic Standing: fair plus with walker      Patient is Alert & Oriented x person, place, time and situation and follows directions    Sensation:  Patient  denies numbness/tingling   Edema:  yes abdomen   Endurance: poor tolerance to upright       Patient education  Patient educated on role of Physical Therapy, risks of immobility, safety and plan of care,  importance of mobility while in hospital  and safety      Patient response to education:   Pt verbalized understanding Pt demonstrated skill Pt requires further education in this area   Yes Partial Yes Treatment:  Patient practiced and was instructed/facilitated in the following treatment: Patient   Sat edge of bed 10 minutes with Minimal assist of 1 to increase dynamic sitting balance and activity tolerance. up in chair for 20 min   Therapeutic Exercises:  ankle pumps and long arc quad  x 10 reps. At end of session, patient sitting edge of bed with OT present call light and phone within reach,  all lines and tubes intact, nursing notified. Patient would benefit from continued skilled Physical Therapy to improve functional independence and quality of life. Patient's/ family goals   rehab    Time in  1430  Time out 1500    Total Treatment Time  10 minutes    Evaluation time includes thorough review of current medical information, gathering information on past medical history/social history and prior level of function, completion of standardized testing/informal observation of tasks, assessment of data, and development of Plan of care and goals.      CPT codes:  Low Complexity PT evaluation (18735)  Neuromuscular reeducation (51849)   10 minutes  1 unit(s)    Willie Hernandez PT

## 2022-02-02 NOTE — CARE COORDINATION
Ss note:2/2/2022.9:44 AM RAPID COVID ORDERED TODAY. Pt presents from [facility] skilled rehab. CM/SW met with pt and she is agreeable to return to facility at discharge. Pt has had a negative pregnancy test and liaison Sue Mendoza is aware. Pt attends [facility] in [facility], sat at 7:00 am. Pt CAN return to facility today, Deepika Williamson is waived. Will await discharge order to arrange transfer, pain management to see pt prior to discharge. Need signed CAITLIN and family will need notified when discharge is written.  HANNAH Mcbride

## 2022-02-02 NOTE — PROGRESS NOTES
[address] [place], [place] [address]ist   Progress Note    Admitting Date and Time: 2/1/2022  8:38 AM  Admit Dx: Metabolic acidosis [M69.1]  Hypoglycemia [E16.2]  Volume overload [E87.70]  Hypervolemia, unspecified hypervolemia type [E87.70]    Subjective/interval history:    Pt complaining of generalized pain. Asking if she can have a 1 time dose of Dilaudid. States she is \"in so much pain\" however does not describe specific symptoms. States she was promised a one time dose of IV narcotics by pain management, however the note from pain management does not reflect this and it was not ordered. In fact, the pain management note states \"2. No narcotics as above as it can worsen gastroparesis per previous notes.  \"    ROS: denies fever, chills, cp, sob, n/v, HA unless stated above.      ARIPiprazole  5 mg Oral Nightly    cholestyramine  1 packet Oral BID    dilTIAZem  90 mg Oral 4x Daily    escitalopram  5 mg Oral Daily    insulin glargine  2 Units SubCUTAneous QAM    insulin lispro  0-3 Units SubCUTAneous 4x Daily AC & HS    levothyroxine  75 mcg Oral Daily    mirtazapine  15 mg Oral Nightly    pantoprazole  40 mg Oral BID    pregabalin  50 mg Oral BID    rOPINIRole  0.25 mg Oral Nightly    sevelamer  800 mg Oral TID WC    sodium chloride flush  5-40 mL IntraVENous 2 times per day    heparin (porcine)  5,000 Units SubCUTAneous 3 times per day    epoetin nena-epbx  5,000 Units SubCUTAneous Once per day on Mon Wed Fri    vitamin D  50,000 Units Oral Weekly     zinc oxide, , 4x Daily PRN  hydrOXYzine, 25 mg, TID PRN  glucose, 15 g, PRN  dextrose, 12.5 g, PRN  glucagon (rDNA), 1 mg, PRN  dextrose, 100 mL/hr, PRN  sodium chloride flush, 5-40 mL, PRN  sodium chloride, 25 mL, PRN  ondansetron, 4 mg, Q8H PRN   Or  ondansetron, 4 mg, Q6H PRN  polyethylene glycol, 17 g, Daily PRN  acetaminophen, 650 mg, Q6H PRN   Or  acetaminophen, 650 mg, Q6H PRN  albuterol, 2.5 mg, Q6H PRN         Objective:    /82 Pulse 97   Temp 97.4 °F (36.3 °C) (Oral)   Resp 14   Ht 5' 4\" (1.626 m)   Wt 141 lb 12.1 oz (64.3 kg)   SpO2 98%   BMI 24.33 kg/m²   General Appearance: alert and oriented to person, place and time. Appears frustrated but not in distress   Skin: warm and dry  Head: normocephalic and atraumatic  Eyes: pupils equal, round, and reactive to light, extraocular eye movements intact, conjunctivae normal  Neck: neck supple and non tender without mass   Pulmonary/Chest: Non-labored on room air. Clear to auscultation bilaterally   Cardiovascular: normal rate, normal S1 and S2 with flow murmur and no carotid bruits. No appreciable JVD  Abdomen: soft, non-tender, mildly distended, normal bowel sounds, no masses or organomegaly  Extremities: no cyanosis, no clubbing and no edema  Neurologic: no cranial nerve deficit and speech normal      Recent Labs     02/01/22  0925      K 5.1*      CO2 22   BUN 22*   CREATININE 4.1*   GLUCOSE 63*   CALCIUM 8.4*       Recent Labs     02/01/22  0925   ALKPHOS 241*   PROT 6.2*   LABALBU 2.7*   BILITOT 0.4   AST 15   ALT 11       Recent Labs     02/01/22  0925   WBC 10.3   RBC 3.29*   HGB 8.7*   HCT 27.3*   MCV 83.0   MCH 26.4   MCHC 31.9*   RDW 22.7*   *   MPV NOT CALC           Radiology:   XR CHEST PORTABLE   Final Result   1. Hazy bilateral perihilar airspace disease favored to represent volume   overload. 2. Patchy opacities within the lung bases which could represent atelectasis   or pneumonia. Assessment and Plan:  Principal Problem:    Other chest pain  Active Problems:    Volume overload  Resolved Problems:    * No resolved hospital problems. *      1. Atypical chest pain  -unlikely cardiac given troponin lower than prior admission and trend is downward, no acute ischemic changes on EKG, spontaneous resolution, and unremarkable nuclear stress in 8/2021  -monitor on telemetry   -repeat EKG does not show significant ischemic changes     2.  Chronic generalized pain   -hold off on narcotic medications unless clear acute indication per prior pain management recommendation   -Lyrica increased to tid by pain management  -I am concerned about drug seeking behavior as what she tells me about conversation with pain management does not correspond to the recommendations in the note from pain management     3. End stage renal disease on hemodialysis with volume overload  -nephrology following for HD management      4. Type I diabetes mellitus  -not in DKA  -continue basal insulin with low dose corrective scale  -hypoglycemia orders in place      5. Sinus tachycardia  -continue diltiazem-gold mornings of dialysis      6. Anemia of chronic kidney disease  -continue Retacrit  -monitor hemoglobin      7. Depression  -continue Lexapro and Abilify      Code Status: Full code   DVT prophylaxis: subcutaneous heparin     Disposition: Plan for discharge to home tomorrow     NOTE: This report was transcribed using voice recognition software. Every effort was made to ensure accuracy; however, inadvertent computerized transcription errors may be present.      Electronically signed by Julio Catalan DO on 2/2/2022 at 12:10 PM

## 2022-02-03 LAB
ANION GAP SERPL CALCULATED.3IONS-SCNC: 13 MMOL/L (ref 7–16)
BUN BLDV-MCNC: 27 MG/DL (ref 6–20)
CALCIUM SERPL-MCNC: 7.9 MG/DL (ref 8.6–10.2)
CHLORIDE BLD-SCNC: 106 MMOL/L (ref 98–107)
CO2: 20 MMOL/L (ref 22–29)
CREAT SERPL-MCNC: 4.5 MG/DL (ref 0.5–1)
EKG ATRIAL RATE: 106 BPM
EKG P AXIS: 43 DEGREES
EKG P-R INTERVAL: 126 MS
EKG Q-T INTERVAL: 326 MS
EKG QRS DURATION: 82 MS
EKG QTC CALCULATION (BAZETT): 433 MS
EKG R AXIS: 32 DEGREES
EKG T AXIS: 83 DEGREES
EKG VENTRICULAR RATE: 106 BPM
GFR AFRICAN AMERICAN: 14
GFR NON-AFRICAN AMERICAN: 14 ML/MIN/1.73
GLUCOSE BLD-MCNC: 195 MG/DL (ref 74–99)
HCT VFR BLD CALC: 25.4 % (ref 34–48)
HEMOGLOBIN: 8.1 G/DL (ref 11.5–15.5)
MCH RBC QN AUTO: 26.7 PG (ref 26–35)
MCHC RBC AUTO-ENTMCNC: 31.9 % (ref 32–34.5)
MCV RBC AUTO: 83.8 FL (ref 80–99.9)
METER GLUCOSE: 162 MG/DL (ref 74–99)
METER GLUCOSE: 182 MG/DL (ref 74–99)
METER GLUCOSE: 190 MG/DL (ref 74–99)
METER GLUCOSE: 237 MG/DL (ref 74–99)
PDW BLD-RTO: 23 FL (ref 11.5–15)
PLATELET # BLD: 106 E9/L (ref 130–450)
PMV BLD AUTO: 11.5 FL (ref 7–12)
POTASSIUM SERPL-SCNC: 4.8 MMOL/L (ref 3.5–5)
RBC # BLD: 3.03 E12/L (ref 3.5–5.5)
REASON FOR REJECTION: NORMAL
REJECTED TEST: NORMAL
SODIUM BLD-SCNC: 139 MMOL/L (ref 132–146)
WBC # BLD: 5.2 E9/L (ref 4.5–11.5)

## 2022-02-03 PROCEDURE — 82962 GLUCOSE BLOOD TEST: CPT

## 2022-02-03 PROCEDURE — 90935 HEMODIALYSIS ONE EVALUATION: CPT

## 2022-02-03 PROCEDURE — 97530 THERAPEUTIC ACTIVITIES: CPT

## 2022-02-03 PROCEDURE — 6370000000 HC RX 637 (ALT 250 FOR IP): Performed by: INTERNAL MEDICINE

## 2022-02-03 PROCEDURE — 1200000000 HC SEMI PRIVATE

## 2022-02-03 PROCEDURE — 2580000003 HC RX 258: Performed by: INTERNAL MEDICINE

## 2022-02-03 PROCEDURE — 96372 THER/PROPH/DIAG INJ SC/IM: CPT

## 2022-02-03 PROCEDURE — G0378 HOSPITAL OBSERVATION PER HR: HCPCS

## 2022-02-03 PROCEDURE — 99232 SBSQ HOSP IP/OBS MODERATE 35: CPT | Performed by: INTERNAL MEDICINE

## 2022-02-03 PROCEDURE — 93010 ELECTROCARDIOGRAM REPORT: CPT | Performed by: INTERNAL MEDICINE

## 2022-02-03 PROCEDURE — 97110 THERAPEUTIC EXERCISES: CPT

## 2022-02-03 PROCEDURE — 80048 BASIC METABOLIC PNL TOTAL CA: CPT

## 2022-02-03 PROCEDURE — 85027 COMPLETE CBC AUTOMATED: CPT

## 2022-02-03 PROCEDURE — 36415 COLL VENOUS BLD VENIPUNCTURE: CPT

## 2022-02-03 PROCEDURE — 6360000002 HC RX W HCPCS: Performed by: INTERNAL MEDICINE

## 2022-02-03 RX ORDER — PREGABALIN 50 MG/1
50 CAPSULE ORAL 3 TIMES DAILY
Qty: 90 CAPSULE | Refills: 0 | Status: SHIPPED | OUTPATIENT
Start: 2022-02-03 | End: 2022-04-27

## 2022-02-03 RX ORDER — PREGABALIN 50 MG/1
50 CAPSULE ORAL 3 TIMES DAILY
Status: DISCONTINUED | OUTPATIENT
Start: 2022-02-03 | End: 2022-02-04 | Stop reason: HOSPADM

## 2022-02-03 RX ADMIN — DILTIAZEM HYDROCHLORIDE 90 MG: 30 TABLET, FILM COATED ORAL at 09:30

## 2022-02-03 RX ADMIN — PANTOPRAZOLE SODIUM 40 MG: 40 TABLET, DELAYED RELEASE ORAL at 09:31

## 2022-02-03 RX ADMIN — INSULIN LISPRO 1 UNITS: 100 INJECTION, SOLUTION INTRAVENOUS; SUBCUTANEOUS at 09:33

## 2022-02-03 RX ADMIN — MIRTAZAPINE 15 MG: 15 TABLET, FILM COATED ORAL at 21:30

## 2022-02-03 RX ADMIN — SEVELAMER CARBONATE 800 MG: 800 TABLET, FILM COATED ORAL at 13:07

## 2022-02-03 RX ADMIN — ROPINIROLE HYDROCHLORIDE 0.25 MG: 0.25 TABLET, FILM COATED ORAL at 21:31

## 2022-02-03 RX ADMIN — DILTIAZEM HYDROCHLORIDE 90 MG: 30 TABLET, FILM COATED ORAL at 21:29

## 2022-02-03 RX ADMIN — PANTOPRAZOLE SODIUM 40 MG: 40 TABLET, DELAYED RELEASE ORAL at 21:30

## 2022-02-03 RX ADMIN — HEPARIN SODIUM 5000 UNITS: 5000 INJECTION INTRAVENOUS; SUBCUTANEOUS at 06:22

## 2022-02-03 RX ADMIN — Medication 10 ML: at 09:37

## 2022-02-03 RX ADMIN — DILTIAZEM HYDROCHLORIDE 90 MG: 30 TABLET, FILM COATED ORAL at 13:07

## 2022-02-03 RX ADMIN — Medication 10 ML: at 21:30

## 2022-02-03 RX ADMIN — HYDROXYZINE PAMOATE 25 MG: 25 CAPSULE ORAL at 21:30

## 2022-02-03 RX ADMIN — CHOLESTYRAMINE 4 G: 4 POWDER, FOR SUSPENSION ORAL at 09:29

## 2022-02-03 RX ADMIN — INSULIN GLARGINE 2 UNITS: 100 INJECTION, SOLUTION SUBCUTANEOUS at 09:33

## 2022-02-03 RX ADMIN — ESCITALOPRAM OXALATE 5 MG: 10 TABLET ORAL at 09:30

## 2022-02-03 RX ADMIN — LEVOTHYROXINE SODIUM 75 MCG: 0.07 TABLET ORAL at 06:22

## 2022-02-03 RX ADMIN — ARIPIPRAZOLE 5 MG: 5 TABLET ORAL at 21:31

## 2022-02-03 RX ADMIN — HEPARIN SODIUM 5000 UNITS: 5000 INJECTION INTRAVENOUS; SUBCUTANEOUS at 21:30

## 2022-02-03 RX ADMIN — PREGABALIN 50 MG: 50 CAPSULE ORAL at 09:31

## 2022-02-03 RX ADMIN — SEVELAMER CARBONATE 800 MG: 800 TABLET, FILM COATED ORAL at 09:31

## 2022-02-03 RX ADMIN — PREGABALIN 50 MG: 50 CAPSULE ORAL at 21:29

## 2022-02-03 ASSESSMENT — PAIN SCALES - GENERAL
PAINLEVEL_OUTOF10: 8
PAINLEVEL_OUTOF10: 0

## 2022-02-03 ASSESSMENT — PAIN DESCRIPTION - PAIN TYPE: TYPE: CHRONIC PAIN

## 2022-02-03 ASSESSMENT — PAIN DESCRIPTION - PROGRESSION: CLINICAL_PROGRESSION: NOT CHANGED

## 2022-02-03 ASSESSMENT — PAIN DESCRIPTION - ONSET: ONSET: ON-GOING

## 2022-02-03 ASSESSMENT — PAIN DESCRIPTION - DESCRIPTORS: DESCRIPTORS: ACHING;DISCOMFORT;DULL

## 2022-02-03 ASSESSMENT — PAIN DESCRIPTION - FREQUENCY: FREQUENCY: INTERMITTENT

## 2022-02-03 ASSESSMENT — PAIN DESCRIPTION - LOCATION: LOCATION: GENERALIZED

## 2022-02-03 NOTE — PROGRESS NOTES
Dr. Eleni Carolina notified of patient just starting dialysis and that this patient would not get picked up until very late this evening.  DC cx-will go tomorrow to [facility]

## 2022-02-03 NOTE — CARE COORDINATION
Ss note: 2/3/2022.10:34 AM NEG RAPID COVID on 2-2-22. Pt is from [facility] [place] skilled rehab. Plan is to return to facility at discharge. Per liaison Lakshmi Woodall is waived and bed is available for pt today. Pt attends [facility] in [facility], Sat at 7:00 am. Will need signed CAITLIN and discharge order to arrange transfer.  HANNAH Mcbride

## 2022-02-03 NOTE — PROGRESS NOTES
[address] [place], [place] [address]ist   Progress Note    Admitting Date and Time: 2/1/2022  8:38 AM  Admit Dx: Metabolic acidosis [W08.7]  Hypoglycemia [E16.2]  Volume overload [E87.70]  Hypervolemia, unspecified hypervolemia type [E87.70]    Subjective/interval history:    2/2: Pt complaining of generalized pain. Asking if she can have a 1 time dose of Dilaudid. States she is \"in so much pain\" however does not describe specific symptoms. States she was promised a one time dose of IV narcotics by pain management, however the note from pain management does not reflect this and it was not ordered. In fact, the pain management note states \"2. No narcotics as above as it can worsen gastroparesis per previous notes.  \"    2/3: Patient states she feels about the same as yesterday, but no recurrence of chest pain. Plan was for discharge earlier today, however patient was getting dialysis treatment and transportation had to be postponed. ROS: denies fever, chills, cp, sob, n/v, HA unless stated above.      ARIPiprazole  5 mg Oral Nightly    cholestyramine  1 packet Oral BID    dilTIAZem  90 mg Oral 4x Daily    escitalopram  5 mg Oral Daily    insulin glargine  2 Units SubCUTAneous QAM    insulin lispro  0-3 Units SubCUTAneous 4x Daily AC & HS    levothyroxine  75 mcg Oral Daily    mirtazapine  15 mg Oral Nightly    pantoprazole  40 mg Oral BID    pregabalin  50 mg Oral BID    rOPINIRole  0.25 mg Oral Nightly    sevelamer  800 mg Oral TID WC    sodium chloride flush  5-40 mL IntraVENous 2 times per day    heparin (porcine)  5,000 Units SubCUTAneous 3 times per day    epoetin nena-epbx  5,000 Units SubCUTAneous Once per day on Mon Wed Fri    vitamin D  50,000 Units Oral Weekly     zinc oxide, , 4x Daily PRN  hydrOXYzine, 25 mg, TID PRN  glucose, 15 g, PRN  dextrose, 12.5 g, PRN  glucagon (rDNA), 1 mg, PRN  dextrose, 100 mL/hr, PRN  sodium chloride flush, 5-40 mL, PRN  sodium chloride, 25 mL, PRN  ondansetron, 4 mg, Q8H PRN   Or  ondansetron, 4 mg, Q6H PRN  polyethylene glycol, 17 g, Daily PRN  acetaminophen, 650 mg, Q6H PRN   Or  acetaminophen, 650 mg, Q6H PRN  albuterol, 2.5 mg, Q6H PRN         Objective:    BP (!) 171/70   Pulse 101   Temp 98.5 °F (36.9 °C) (Oral)   Resp 16   Ht 5' 4\" (1.626 m)   Wt 141 lb 12.1 oz (64.3 kg)   SpO2 96%   BMI 24.33 kg/m²   General Appearance: alert and oriented to person, place and time. Appears frustrated but not in distress   Skin: warm and dry  Head: normocephalic and atraumatic  Eyes: pupils equal, round, and reactive to light, extraocular eye movements intact, conjunctivae normal  Neck: neck supple and non tender without mass   Pulmonary/Chest: Non-labored on room air. Clear to auscultation bilaterally   Cardiovascular: normal rate, normal S1 and S2 with flow murmur and no carotid bruits. No appreciable JVD  Abdomen: soft, non-tender, mildly distended, normal bowel sounds, no masses or organomegaly  Extremities: no cyanosis, no clubbing and no edema  Neurologic: no cranial nerve deficit and speech normal      Recent Labs     02/01/22  0925 02/03/22  1138    139   K 5.1* 4.8    106   CO2 22 20*   BUN 22* 27*   CREATININE 4.1* 4.5*   GLUCOSE 63* 195*   CALCIUM 8.4* 7.9*       Recent Labs     02/01/22  0925   ALKPHOS 241*   PROT 6.2*   LABALBU 2.7*   BILITOT 0.4   AST 15   ALT 11       Recent Labs     02/01/22  0925 02/03/22  1138   WBC 10.3 5.2   RBC 3.29* 3.03*   HGB 8.7* 8.1*   HCT 27.3* 25.4*   MCV 83.0 83.8   MCH 26.4 26.7   MCHC 31.9* 31.9*   RDW 22.7* 23.0*   * 106*   MPV NOT CALC 11.5           Radiology:   XR CHEST PORTABLE   Final Result   1. Hazy bilateral perihilar airspace disease favored to represent volume   overload. 2. Patchy opacities within the lung bases which could represent atelectasis   or pneumonia.              Assessment and Plan:  Principal Problem:    Other chest pain  Active Problems:    Volume overload  Resolved Problems:    * No resolved hospital problems. *      1. Atypical chest pain  -unlikely cardiac given troponin lower than prior admission and trend is downward, no acute ischemic changes on EKG, spontaneous resolution, and unremarkable nuclear stress in 8/2021  -monitor on telemetry   -repeat EKG does not show significant ischemic changes     2. Chronic generalized pain   -hold off on narcotic medications unless clear acute indication per prior pain management recommendation   -Lyrica increased to tid by pain management  -I am concerned about drug seeking behavior as what she tells me about conversation with pain management does not correspond to the recommendations in the note from pain management     3. End stage renal disease on hemodialysis with volume overload  -nephrology following for HD management      4. Type I diabetes mellitus  -not in DKA  -continue basal insulin with low dose corrective scale  -hypoglycemia orders in place      5. Sinus tachycardia  -continue diltiazem-gold mornings of dialysis      6. Anemia of chronic kidney disease  -continue Retacrit  -monitor hemoglobin      7. Depression  -continue Lexapro and Abilify      Code Status: Full code   DVT prophylaxis: subcutaneous heparin     Disposition: Stable for discharge back to [facility]. NOTE: This report was transcribed using voice recognition software. Every effort was made to ensure accuracy; however, inadvertent computerized transcription errors may be present.      Electronically signed by Viri Reyes DO on 2/3/2022 at 3:29 PM

## 2022-02-03 NOTE — FLOWSHEET NOTE
02/03/22 1800   During Hemodialysis Assessment   BP 97/63   Pulse 86   Blood Flow Rate (ml/min) 350 ml/min   Ultrafiltration Rate (ml/hr) 340 ml/hr   Ultrafiltration Total 500 ml   Temp 96.4 °F (35.8 °C)   Arterial Pressure (mmHg) -100 mmHg   Venous Pressure (mmHg) 90   TMP 60   Hemodialysis Conductivity 13.2      Comments patient treatment end time, patient tolerated treatment well. patient for 3 hour treatment (see previous note for time difference). Patient on a 2 potassium, 2 calcium bath with a blood flow rate of 350 and dialysate flow rate of 600. [employer] had a blood volume process of 56L and a fluid removal of 500ml. treatment details reported to primary gil mccauley.    Access Visible Yes

## 2022-02-03 NOTE — PROGRESS NOTES
Physical Therapy    Physical Therapy Treatment Note/Plan of Care    Room #:  0420/0420-01  Patient Name: Zachariah Patel  YOB: 1990  MRN: [id number]    Date of Service: 2/3/2022     Tentative placement recommendation: Subacute rehab  Equipment recommendation:  To be determined      Evaluating Physical Therapist: Derek Hoover, PT  #[id number]      Specific Provider Orders/Date/Referring Provider :  02/02/22 0915   PT eval and treat Start: 02/02/22 0915, End: 02/02/22 0915, ONE TIME, Standing Count: 1 Occurrences, R    Andrea Hernandez DO      Admitting Diagnosis:   Metabolic acidosis [H60.6]  Hypoglycemia [E16.2]  Volume overload [E87.70]  Hypervolemia, unspecified hypervolemia type [E87.70]    Admitted with    chest pain and hypotension during dialysis    Surgery: none  Visit Diagnoses       Codes    Metabolic acidosis    -  Primary E87.2    Hypoglycemia     E16.2          Patient Active Problem List   Diagnosis    Non compliance w medication regimen    Tobacco smoking complicating pregnancy    MTHFR mutation    Generalized abdominal pain    Diabetic ketoacidosis without coma associated with type 1 diabetes mellitus ([facility]    Hypertension    Prolonged QT interval    Iron deficiency anemia    Sinus tachycardia    Bladder dysfunction    Hypokalemia    Severe protein-calorie malnutrition (HCC)    Uncontrolled type 1 diabetes mellitus with hyperglycemia (HCC)    End stage renal disease (HCC)    Hypothyroidism    Hyperlipidemia    Acute cystitis    Nondisplaced fracture of neck of fifth metacarpal bone, left hand, initial encounter for closed fracture    Chronic right-sided low back pain    Endocarditis    Seizure (HCC)    Hyperkalemia    Hypomagnesemia    Hypocalcemia    Intractable nausea and vomiting    Wound of left leg, sequela    Syncope    Type 1 diabetes mellitus without complication (HCC)    Hyperosmolality    Major depressive disorder    Lactic acid acidosis    reaching activities to activate trunk muscles , Hands on support to maintain midline , Facilitate active trunk muscle engagement  and Facilitate postural control in all planes   -Standing Balance: Perform strengthening exercises in standing to promote motor control with or without upper extremity support , Instruct patient on adequate base of support to maintain balance and Challenge balance utilizing reaching  activities beyond center of gravity    -Transfers: Provide instruction on proper hand and foot position for adequate transfer of weight onto lower extremities and use of gait device if needed, Cues for hand placement, technique and safety. Provide stabilization to prevent fall  and Facilitate weight shift forward on to lower extremities and provide necessary stabilization of bilateral lower extremities   -Gait: Gait training, Standing activities to improve: base of support, weight shift, weight bearing , Exercises to improve trunk control and Exercises to improve hip and knee control   -Endurance: Utilize Supervised activities to increase level of endurance to allow for safe functional mobility including transfers and gait     PT long term treatment goals are located in below grid    Patient and or family understand(s) diagnosis, prognosis, and plan of care. Frequency of treatments: Patient will be seen  3-5 times/week.          Prior Level of Function: Patient ambulated independently and with standard walker    Rehab Potential: good    for baseline    Past medical history:   Past Medical History:   Diagnosis Date    Acute congestive heart failure ([facility]     BC (acute kidney injury) ([facility] 10/01/2019    Cardiac arrest ([facility] 02/15/2021    Cephalgia 10/09/2019    Chronic kidney disease     Depression     Diabetes mellitus ([address]     Diabetic gastroparesis associated with type 1 diabetes mellitus ([facility] 12/17/2018    Diabetic ketoacidosis ([facility] 08/27/2011    Diabetic ketoacidosis with coma associated with type 1 diabetes mellitus ([facility] 2013    Diabetic polyneuropathy associated with type 1 diabetes mellitus ([facility] 2020    Drug use complicating pregnancy in third trimester     Endocarditis 10/31/2020    ESRD (end stage renal disease) ([facility] 2020    H/O cardiovascular stress test 2021    Lexiscan    [facility]od[facility])     History of blood transfusion 2019    Hyperlipidemia 10/08/2020    Hyperosmolar hyperglycemic state (HHS) ([facility] 2020    Hypothyroidism 10/08/2020    Iron deficiency anemia 10/01/2019    MDRO (multiple drug resistant organisms) resistance     MRSA (methicillin resistant Staphylococcus aureus)     back wound abcess    Non compliance w medication regimen 2016    Other disorders of kidney and ureter     Pregnancy 2016    16 weeks    Previous  delivery affecting pregnancy, antepartum 2017    Previous stillbirth or demise, antepartum 2016    Seizure [facility] 2020    Severe pre-eclampsia in third trimester 2016    Shock liver 02/15/2021    Valvular endocarditis 11/10/2020    This Diagnosis was added to the Problem List based on transcribed orders from Dr. Laura Macias        Past Surgical History:   Procedure Laterality Date    BACK SURGERY      abscess    CATHETER REMOVAL N/A 10/1/2021    PERITONEAL CATHETER REMOVAL performed by Denver Rucks, MD at [facility]      x2   [facility], LAPAROSCOPIC N/A 2019    CHOLECYSTECTOMY LAPAROSCOPIC performed by Luca Acevedo MD at [facility] 2018    COLONOSCOPY WITH BIOPSY performed by Malathi Francis MD at [facility] N/A 2018    COLONOSCOPY WITH BIOPSY performed by Jacob Phillips MD at [address] [place] [address] ECHO COMPL [address]  2012    EF 57%    ECHO COMPL W DOP COLOR FLOW  6/10/2013         EMBOLECTOMY N/A 2020    ANGIOVAC, VEGECTOMY, YULISSA -- REQS ROOM 3 performed by Naila Ashby MD at [facility] Left 2/23/2021    LEFT LEG INCISION AND DRAINAGE, DEBRIDEMENT, WOUND VAC APPLICATION performed by Mabel Miranda DPM at [facility] Left 5/18/2021    LEFT LEG DEBRIDEMENT BIOPSY POSS APPLICATION WOUND VAC performed by Mabel Miranda DPM at [address] N/A 6/26/2020    LAPAROSCOPIC INSERTION PERITONEAL DIALYSIS CATHETER performed by Gi Zamora MD at [address] [place] [address] ESOPHAGOGASTRODUODENOSCOPY TRANSORAL DIAGNOSTIC N/A 5/30/2018    EGD ESOPHAGOGASTRODUODENOSCOPY performed by Mendel Sin MD at [id number] [place] [address] TRANSESOPHAGEAL ECHOCARDIOGRAM N/A 10/19/2020    TRANSESOPHAGEAL ECHOCARDIOGRAM WITH BUBBLE STUDY performed by Lashanda Rodriguez MD at [address] [place] [address] TUNNELED VENOUS PORT PLACEMENT  04/2018    UPPER GASTROINTESTINAL ENDOSCOPY  12/18/2018    EGD BIOPSY performed by Sarita Cranker, MD at [address] [place] [address] 10/11/2019    EGD ESOPHAGOGASTRODUODENOSCOPY performed by Lilo Bernstein DO at [facility] N/A 7/14/2021    EGD BIOPSY performed by Manohar Sotomayor MD at [facility]:    Precautions:  Up with assistance, falls, alarm, contact isolation and cdiff rule out ,    Social history: Patient lives with mother and her 2 children (3and 11years old)  in a two story home resides first  with 2 steps  to enter with Rail   ; plan is to ramp home    Equipment owned: Wheelchair, Bedside commode and Shower chair,  Std walker    [id number] [facility]   How much difficulty turning over in bed?: A Little  How much difficulty sitting down on / standing up from a chair with arms?: A Lot  How much difficulty moving from lying on back to sitting on side of bed?: A Lot  How much help from another person moving to and from a bed to a chair?: A Lot  How much help from another person needed to EOB:  good    Dynamic Standing: fair plus with walker      Patient is Alert & Oriented x person, place, time and situation and follows directions    Sensation:  Patient  denies numbness/tingling   Edema:  yes abdomen   Endurance: poor tolerance to upright       Patient education  Patient educated on role of Physical Therapy, risks of immobility, safety and plan of care,  importance of mobility while in hospital  and safety      Patient response to education:   Pt verbalized understanding Pt demonstrated skill Pt requires further education in this area   Yes Partial Yes      Treatment:  Patient practiced and was instructed/facilitated in the following treatment: Patient performed supine exercise  Sat edge of bed 10 minutes with Minimal assist of 1 to increase dynamic sitting balance and activity tolerance. Progressed to supervision. patient assisted back to supine for lab work. Patient wanting to continue therapy after. Assisted back to a seated position. Stood x 2, steps to head of bed. Performed seated exercise. Assisted back to right side lying due to stomach pain. Therapeutic Exercises:  ankle pumps, quad sets, glut sets, heel slide, straight leg raise and long arc quad  x 10 reps. At end of session, patient in bed with alarm call light and phone within reach,  all lines and tubes intact, nursing notified. Patient would benefit from continued skilled Physical Therapy to improve functional independence and quality of life.          Patient's/ family goals   rehab    Time in  1119  Time out 1148    Total Treatment Time 29  minutes      CPT codes:    Therapeutic activities (51145)   16 minutes  1 unit(s)  Therapeutic exercises (78357)   10 minutes  1 unit(s)  Non billable time 3 minutes    Venancio Glass PTA  R#[id number]

## 2022-02-03 NOTE — PROGRESS NOTES
Call placed to Dr. Jacob Salazar. Doctor approved of decreasing treatment time today to 3 hour treatment.

## 2022-02-03 NOTE — PLAN OF CARE
Problem: Pain:  Goal: Pain level will decrease  Outcome: Met This Shift  Goal: Control of acute pain  Outcome: Met This Shift  Goal: Control of chronic pain  Outcome: Met This Shift     Problem: Falls - Risk of:  Goal: Will remain free from falls  Outcome: Met This Shift  Goal: Absence of physical injury  Outcome: Met This Shift     Problem: Skin Integrity:  Goal: Will show no infection signs and symptoms  Outcome: Met This Shift  Goal: Absence of new skin breakdown  Outcome: Met This Shift

## 2022-02-03 NOTE — PROGRESS NOTES
Department of Internal Medicine  Nephrology Progress Note      Reason for Consult:  End-Stage Renal Disease  Requesting Physician:  Yasmani Miranda DO    CHIEF COMPLAINT:  Chest pain, hypotensionj    History Obtained From:  patient, electronic medical record    HISTORY OF PRESENT ILLNESS:  Briefly, Miss Swapna Junior is a 36year-old female with a PMH of ESRD on hemodialysis 3 days/wk, on TTS schedule at [facility], Type I DM, poorly controlled with recurrent admissions for DKA, HTN, gastroparesis, ascites, infective endocarditis, cardiac arrest, hypothyroidism and depression. She was recently discharged on 1/27/22 after being admitted with DKA. She was recently moved to a nursing home. She presented to the ER on February 1, 2022 with complaints of chest pain and hypotension during dialysis today. She roughly had only 1/3 of her dialysis treatment before being transported the the ER. She reports having diarrhea for roughly one month and intermittently shortness of breath. In the ER she was found to have a potassium of 5.1, BUN 22, creatinine 4.1, glucose 41, calcium 8.4, pro-bnp > 70,000.  Renal is consulted for ESRD on HD     Current Medications:    Current Facility-Administered Medications: zinc oxide 40 % paste, , Topical, 4x Daily PRN  ARIPiprazole (ABILIFY) tablet 5 mg, 5 mg, Oral, Nightly  cholestyramine (QUESTRAN) packet 4 g, 1 packet, Oral, BID  dilTIAZem (CARDIZEM) tablet 90 mg, 90 mg, Oral, 4x Daily  escitalopram (LEXAPRO) tablet 5 mg, 5 mg, Oral, Daily  hydrOXYzine (VISTARIL) capsule 25 mg, 25 mg, Oral, TID PRN  insulin glargine (LANTUS) injection vial 2 Units, 2 Units, SubCUTAneous, QAM  insulin lispro (HUMALOG) injection vial 0-3 Units, 0-3 Units, SubCUTAneous, 4x Daily AC & HS  glucose (GLUTOSE) 40 % oral gel 15 g, 15 g, Oral, PRN  dextrose 50 % IV solution, 12.5 g, IntraVENous, PRN  glucagon (rDNA) injection 1 mg, 1 mg, IntraMUSCular, PRN  dextrose 5 % solution, 100 mL/hr, IntraVENous, PRN  levothyroxine (SYNTHROID) tablet 75 mcg, 75 mcg, Oral, Daily  mirtazapine (REMERON) tablet 15 mg, 15 mg, Oral, Nightly  pantoprazole (PROTONIX) tablet 40 mg, 40 mg, Oral, BID  pregabalin (LYRICA) capsule 50 mg, 50 mg, Oral, BID  rOPINIRole (REQUIP) tablet 0.25 mg, 0.25 mg, Oral, Nightly  sevelamer (RENVELA) tablet 800 mg, 800 mg, Oral, TID WC  sodium chloride flush 0.9 % injection 5-40 mL, 5-40 mL, IntraVENous, 2 times per day  sodium chloride flush 0.9 % injection 5-40 mL, 5-40 mL, IntraVENous, PRN  0.9 % sodium chloride infusion, 25 mL, IntraVENous, PRN  ondansetron (ZOFRAN-ODT) disintegrating tablet 4 mg, 4 mg, Oral, Q8H PRN **OR** ondansetron (ZOFRAN) injection 4 mg, 4 mg, IntraVENous, Q6H PRN  polyethylene glycol (GLYCOLAX) packet 17 g, 17 g, Oral, Daily PRN  acetaminophen (TYLENOL) tablet 650 mg, 650 mg, Oral, Q6H PRN **OR** acetaminophen (TYLENOL) suppository 650 mg, 650 mg, Rectal, Q6H PRN  heparin (porcine) injection 5,000 Units, 5,000 Units, SubCUTAneous, 3 times per day  epoetin nena-epbx (RETACRIT) injection 5,000 Units, 5,000 Units, SubCUTAneous, Once per day on Mon Wed Fri  vitamin D (ERGOCALCIFEROL) capsule 50,000 Units, 50,000 Units, Oral, Weekly  albuterol (PROVENTIL) nebulizer solution 2.5 mg, 2.5 mg, Nebulization, Q6H PRN  Allergies:  Cefepime and Toradol [ketorolac tromethamine]    Social History:    TOBACCO:   reports that she quit smoking about a year ago. Her smoking use included cigarettes. She has a 3.00 pack-year smoking history. She has never used smokeless tobacco.  ETOH:   reports no history of alcohol use. DRUGS:   reports current drug use. Drug: Opiates . Family History:       Problem Relation Age of Onset   Cj Bojorquez Asthma Mother     Hypertension Mother     High Blood Pressure Mother     Diabetes Mother     Asthma Brother     High Blood Pressure Father      REVIEW OF SYSTEMS:   Constitutional: Positive for activity change and fatigue. Negative for chills and fever.    HENT: Negative for congestion, rhinorrhea, sinus pressure, sinus pain and sneezing. Eyes: Negative for pain and redness. Respiratory: Positive for shortness of breath. Negative for cough and chest tightness. Cardiovascular: Positive for chest pain. Negative for palpitations and leg swelling. Gastrointestinal: Positive for abdominal pain and diarrhea. Negative for constipation, nausea and vomiting. Genitourinary: Negative for dysuria, flank pain, frequency, hematuria and urgency. Musculoskeletal: Positive for myalgias. Negative for arthralgias, back pain, gait problem and joint swelling. Skin: Negative for rash. Neurological: Negative for dizziness, light-headedness and headaches.         PHYSICAL EXAM:      Vitals:    VITALS:  BP (!) 171/70   Pulse 101   Temp 98.5 °F (36.9 °C) (Oral)   Resp 16   Ht 5' 4\" (1.626 m)   Wt 141 lb 12.1 oz (64.3 kg)   SpO2 96%   BMI 24.33 kg/m²   24HR INTAKE/OUTPUT:      Intake/Output Summary (Last 24 hours) at 2/3/2022 1500  Last data filed at 2/3/2022 4087  Gross per 24 hour   Intake 450 ml   Output    Net 450 ml       Access: RIJ tunneled dialysis catheter  Constitutional:  Lethargic, but awakens to voice  HEENT:  PERRL, normocephalic, atraumatic  Respiratory:  CTA bilaterally  Cardiovascular/Edema:  ST, RRR, no murmur gallop or rub  Gastrointestinal:  abd distended, c/o persistent abdominal pain  Neurologic:  Lethargic, awakens to voice, follows commands, no focal deficit  Skin:  Warm, dry, intact    DATA:    CBC with Differential:    Lab Results   Component Value Date    WBC 5.2 02/03/2022    RBC 3.03 02/03/2022    HGB 8.1 02/03/2022    HCT 25.4 02/03/2022     02/03/2022    MCV 83.8 02/03/2022    MCH 26.7 02/03/2022    MCHC 31.9 02/03/2022    RDW 23.0 02/03/2022    NRBC 0.9 08/10/2020    SEGSPCT 67 06/27/2013    METASPCT 1.7 08/10/2020    LYMPHOPCT 20.4 02/01/2022    MONOPCT 1.8 02/01/2022    MYELOPCT 0.9 01/19/2022    BASOPCT 1.8 02/01/2022    MONOSABS 0.21 02/01/2022 LYMPHSABS 2.06 02/01/2022    EOSABS 0.09 02/01/2022    BASOSABS 0.19 02/01/2022     CMP:    Lab Results   Component Value Date     02/03/2022    K 4.8 02/03/2022    K 5.1 02/01/2022     02/03/2022    CO2 20 02/03/2022    BUN 27 02/03/2022    CREATININE 4.5 02/03/2022    GFRAA 14 02/03/2022    LABGLOM 14 02/03/2022    GLUCOSE 195 02/03/2022    GLUCOSE 130 05/18/2012    PROT 6.2 02/01/2022    LABALBU 2.7 02/01/2022    LABALBU 4.1 05/18/2012    CALCIUM 7.9 02/03/2022    BILITOT 0.4 02/01/2022    ALKPHOS 241 02/01/2022    AST 15 02/01/2022    ALT 11 02/01/2022     Magnesium:    Lab Results   Component Value Date    MG 1.6 01/25/2022     Phosphorus:    Lab Results   Component Value Date    PHOS 2.4 01/23/2022     Radiology Review:            Chest XR 2/1/22   1. Hazy bilateral perihilar airspace disease favored to represent volume   overload. 2. Patchy opacities within the lung bases which could represent atelectasis   or pneumonia. IMPRESSION/RECOMMENDATIONS:  Briefly, Miss Evelyne Mccain is a 36year-old female with a PMH of ESRD on hemodialysis 3 days/wk, Type I DM, poorly control on TTS schedule at [place], and recurrent admissions for DKA, HTN, gastroparesis, ascites, infective endocarditis, cardiac arrest, hypothyroidism and depression. She was recently discharged on 1/27/22 after being admitted with DKA. She presented to the ER on February 1, 2022 with complaints of chest pain and hypotension. She was receiving dialysis treatment today when she because hypotensive and started having chest pain. She roughly had only 1/3 of her dialysis treatment before being transported the the ER. She reports having diarrhea for roughly one month and intermittently shortness of breath. In the ER she was found to have a potassium of 5.1, BUN 22, creatinine 4.1, glucose 63, calcium 8.4, pro-bnp > 70,000. Renal is consulted for ESRD on HD       1.  ESRD on  hemodialysis 3 days/wk  via RIJ tunneled dialysis catheter. To continue HD TTS while inpatient. Will schedule for HD 2 hours today   2. Hyperkalemia, will schedule for HD   2. HTN, on lopressor and amlodipine   3. HFpEF 60-65%, Pro-BNP > 70,000  4. Hypocalcemia, 2/2 vitamin D deficiency on CKD. 5. Vitamin D deficiency,  On ergocalciferol   6. 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 Anemia of CKD, iron level 40, iron saturation 77%,   8. Type I DM, poorly controlled with frequent readmissions for DKA  ------------------------------------------------------  10. Hx Covid 19, unvaccinated  11. Restless leg syndrome, on ropinirole at home  12. Depression, on Abilify at home  13. Hypothyroidism, on levothyroxine  14. History of gastroparesis, on metoclopramide at home     Plan:     · HD for 2 hours yesterday  · HD 3 times a week T-Th-Sat  · Obtain Phosphorus level   · Start epoetin alpha 5,000 units 3 times/wk  · Continue ergocalciferol 50,000 units weekly  · Continue to monitor phosphorus levels  · Continue to monitor potassium level  · Continue to monitor H&H   · Continue to monitor renal function  · Change diet to carb controlled diet, discussed with RN         Thank you very much Dr. Georges Nicholson DO for allowing us to participate in the care with [facility].

## 2022-02-03 NOTE — CARE COORDINATION
Ss note: 2/3/2022.1:22 PM Neg covid on 2-2-22. Discharge order noted. [facility] transfer back to [facility] today at 4:30 pm for continued rehab. Facility liaison, nursing and pt mother notified.  Pt attends [facility] in [place] T,Thurs, Sat at 7:00 am. HANNAH Fuentes

## 2022-02-03 NOTE — CARE COORDINATION
Ss note:2/3/2022.3:31 PM Neg covid on 2-22-22. SW notified that pt is currently in HD and sw is unable to arrange an ambulance time for discharge today, due to weather & late time of day. SW cancelled [facility] transfer at 4:30 pm today. MINISTERIO notified [facility] that discharge will be cancelled today and will arrange for transfer tomorrow to return to [place]. Charge nurse notified Dr. Gordo Fitch.  Pt attends [facility], sat 7:00 am. HANNAH Correa

## 2022-02-03 NOTE — PROGRESS NOTES
Department of Internal Medicine  Nephrology Progress Note      Events reviewed. SUBJECTIVE:  We are following Miss Abrahan Osborne for ESRD on HD. She reports no complaints. Eating breakfast at the time of my examination.     PHYSICAL EXAM:      Vitals:    VITALS:  /60   Pulse 101   Temp 98.5 °F (36.9 °C) (Oral)   Resp 16   Ht 5' 4\" (1.626 m)   Wt 141 lb 12.1 oz (64.3 kg)   SpO2 96%   BMI 24.33 kg/m²   24HR INTAKE/OUTPUT:      Intake/Output Summary (Last 24 hours) at 2/3/2022 1018  Last data filed at 2/3/2022 4827  Gross per 24 hour   Intake 370 ml   Output    Net 370 ml       Access: RIJ tunneled dialysis catheter  Constitutional:  Lethargic, but awakens to voice  HEENT:  PERRL, normocephalic, atraumatic  Respiratory:  CTA bilaterally  Cardiovascular/Edema:  ST, RRR, no murmur gallop or rub  Gastrointestinal:  abd distended, c/o persistent abdominal pain  Neurologic:  Lethargic, awakens to voice, follows commands, no focal deficit  Skin:  Warm, dry, intact  Scheduled Meds:   ARIPiprazole  5 mg Oral Nightly    cholestyramine  1 packet Oral BID    dilTIAZem  90 mg Oral 4x Daily    escitalopram  5 mg Oral Daily    insulin glargine  2 Units SubCUTAneous QAM    insulin lispro  0-3 Units SubCUTAneous 4x Daily AC & HS    levothyroxine  75 mcg Oral Daily    mirtazapine  15 mg Oral Nightly    pantoprazole  40 mg Oral BID    pregabalin  50 mg Oral BID    rOPINIRole  0.25 mg Oral Nightly    sevelamer  800 mg Oral TID WC    sodium chloride flush  5-40 mL IntraVENous 2 times per day    heparin (porcine)  5,000 Units SubCUTAneous 3 times per day    epoetin nena-epbx  5,000 Units SubCUTAneous Once per day on Mon Wed Fri    vitamin D  50,000 Units Oral Weekly     Continuous Infusions:   dextrose      sodium chloride       PRN Meds:.zinc oxide, hydrOXYzine, glucose, dextrose, glucagon (rDNA), dextrose, sodium chloride flush, sodium chloride, ondansetron **OR** ondansetron, polyethylene glycol, acetaminophen **OR** acetaminophen, albuterol    DATA:    CBC with Differential:    Lab Results   Component Value Date    WBC 10.3 02/01/2022    RBC 3.29 02/01/2022    HGB 8.7 02/01/2022    HCT 27.3 02/01/2022     02/01/2022    MCV 83.0 02/01/2022    MCH 26.4 02/01/2022    MCHC 31.9 02/01/2022    RDW 22.7 02/01/2022    NRBC 0.9 08/10/2020    SEGSPCT 67 06/27/2013    METASPCT 1.7 08/10/2020    LYMPHOPCT 20.4 02/01/2022    MONOPCT 1.8 02/01/2022    MYELOPCT 0.9 01/19/2022    BASOPCT 1.8 02/01/2022    MONOSABS 0.21 02/01/2022    LYMPHSABS 2.06 02/01/2022    EOSABS 0.09 02/01/2022    BASOSABS 0.19 02/01/2022     CMP:    Lab Results   Component Value Date     02/01/2022    K 5.1 02/01/2022     02/01/2022    CO2 22 02/01/2022    BUN 22 02/01/2022    CREATININE 4.1 02/01/2022    GFRAA 15 02/01/2022    LABGLOM 15 02/01/2022    GLUCOSE 63 02/01/2022    GLUCOSE 130 05/18/2012    PROT 6.2 02/01/2022    LABALBU 2.7 02/01/2022    LABALBU 4.1 05/18/2012    CALCIUM 8.4 02/01/2022    BILITOT 0.4 02/01/2022    ALKPHOS 241 02/01/2022    AST 15 02/01/2022    ALT 11 02/01/2022     Magnesium:    Lab Results   Component Value Date    MG 1.6 01/25/2022     Phosphorus:    Lab Results   Component Value Date    PHOS 2.4 01/23/2022     Radiology Review:            Chest XR 2/1/22   1. Hazy bilateral perihilar airspace disease favored to represent volume   overload. 2. Patchy opacities within the lung bases which could represent atelectasis   or pneumonia. BRIEF SUMMARY OF INITIAL CONSULT:     Briefly, Miss Abrahan Osborne is a 36year-old female with a PMH of ESRD on hemodialysis 3 days/wk, Type I DM, poorly control on TTS schedule at [place], and recurrent admissions for DKA, HTN, gastroparesis, ascites, infective endocarditis, cardiac arrest, hypothyroidism and depression. She was recently discharged on 1/27/22 after being admitted with DKA.  She presented to the ER on February 1, 2022 with complaints of chest pain and hypotension. She was receiving dialysis treatment today when she because hypotensive and started having chest pain. She roughly had only 1/3 of her dialysis treatment before being transported the the ER. She reports having diarrhea for roughly one month and intermittently shortness of breath. In the ER she was found to have a potassium of 5.1, BUN 22, creatinine 4.1, glucose 63, calcium 8.4, pro-bnp > 70,000. Renal is consulted for ESRD on HD    Problems resolved. · Hyperkalemia, will schedule for HD       IMPRESSION/RECOMMENDATIONS:      1. ESRD on  hemodialysis 3 days/wk  via RIJ tunneled dialysis catheter. To continue HD TTS while inpatient. 2. HTN, on lopressor and amlodipine   3. HFpEF 60-65%, Pro-BNP > 70,000  4. Hypocalcemia, 2/2 vitamin D deficiency on CKD. 5. Vitamin D deficiency,  On ergocalciferol   6. 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 Anemia of CKD, iron level 40, iron saturation 77%,   8. Type I DM, poorly controlled with frequent readmissions for DKA  ------------------------------------------------------  10. Hx Covid 19, unvaccinated  11. Restless leg syndrome, on ropinirole at home  12. Depression, on Abilify at home  13. Hypothyroidism, on levothyroxine  14.  History of gastroparesis, on metoclopramide at home     Plan:     · HD today and 3 times a week T-Th-Sat  · Obtain Phosphorus level   · Start epoetin alpha 5,000 units 3 times/wk  · Continue ergocalciferol 50,000 units weekly  · Continue to monitor phosphorus levels  · Continue to monitor potassium level  · Continue to monitor H&H   · Continue to monitor renal function  · Change diet to carb controlled diet, low potassium  · Ok for discharge after dialysis today from renal POV         Electronically signed by HEBER Philip CNP on 2/3/2022 at 10:18 AM  Agree as annotated  Wagner Zapata MD

## 2022-02-04 VITALS
SYSTOLIC BLOOD PRESSURE: 110 MMHG | WEIGHT: 141.76 LBS | DIASTOLIC BLOOD PRESSURE: 70 MMHG | BODY MASS INDEX: 24.2 KG/M2 | OXYGEN SATURATION: 98 % | HEIGHT: 64 IN | TEMPERATURE: 98 F | RESPIRATION RATE: 16 BRPM | HEART RATE: 103 BPM

## 2022-02-04 LAB
ANION GAP SERPL CALCULATED.3IONS-SCNC: 7 MMOL/L (ref 7–16)
BUN BLDV-MCNC: 16 MG/DL (ref 6–20)
CALCIUM SERPL-MCNC: 7.9 MG/DL (ref 8.6–10.2)
CHLORIDE BLD-SCNC: 104 MMOL/L (ref 98–107)
CO2: 26 MMOL/L (ref 22–29)
CREAT SERPL-MCNC: 3 MG/DL (ref 0.5–1)
GFR AFRICAN AMERICAN: 22
GFR NON-AFRICAN AMERICAN: 22 ML/MIN/1.73
GLUCOSE BLD-MCNC: 175 MG/DL (ref 74–99)
HCT VFR BLD CALC: 25 % (ref 34–48)
HEMOGLOBIN: 7.9 G/DL (ref 11.5–15.5)
MCH RBC QN AUTO: 26.4 PG (ref 26–35)
MCHC RBC AUTO-ENTMCNC: 31.6 % (ref 32–34.5)
MCV RBC AUTO: 83.6 FL (ref 80–99.9)
METER GLUCOSE: 160 MG/DL (ref 74–99)
PDW BLD-RTO: 22.9 FL (ref 11.5–15)
PHOSPHORUS: 2.9 MG/DL (ref 2.5–4.5)
PLATELET # BLD: 107 E9/L (ref 130–450)
PMV BLD AUTO: ABNORMAL FL (ref 7–12)
POTASSIUM SERPL-SCNC: 4.4 MMOL/L (ref 3.5–5)
RBC # BLD: 2.99 E12/L (ref 3.5–5.5)
SODIUM BLD-SCNC: 137 MMOL/L (ref 132–146)
WBC # BLD: 4 E9/L (ref 4.5–11.5)

## 2022-02-04 PROCEDURE — 80048 BASIC METABOLIC PNL TOTAL CA: CPT

## 2022-02-04 PROCEDURE — 82962 GLUCOSE BLOOD TEST: CPT

## 2022-02-04 PROCEDURE — 84100 ASSAY OF PHOSPHORUS: CPT

## 2022-02-04 PROCEDURE — 36415 COLL VENOUS BLD VENIPUNCTURE: CPT

## 2022-02-04 PROCEDURE — 96372 THER/PROPH/DIAG INJ SC/IM: CPT

## 2022-02-04 PROCEDURE — 99239 HOSP IP/OBS DSCHRG MGMT >30: CPT | Performed by: INTERNAL MEDICINE

## 2022-02-04 PROCEDURE — G0378 HOSPITAL OBSERVATION PER HR: HCPCS

## 2022-02-04 PROCEDURE — 6360000002 HC RX W HCPCS: Performed by: INTERNAL MEDICINE

## 2022-02-04 PROCEDURE — 85027 COMPLETE CBC AUTOMATED: CPT

## 2022-02-04 PROCEDURE — 6370000000 HC RX 637 (ALT 250 FOR IP): Performed by: INTERNAL MEDICINE

## 2022-02-04 RX ADMIN — PREGABALIN 50 MG: 50 CAPSULE ORAL at 09:04

## 2022-02-04 RX ADMIN — CHOLESTYRAMINE 4 G: 4 POWDER, FOR SUSPENSION ORAL at 09:04

## 2022-02-04 RX ADMIN — SEVELAMER CARBONATE 800 MG: 800 TABLET, FILM COATED ORAL at 09:04

## 2022-02-04 RX ADMIN — LEVOTHYROXINE SODIUM 75 MCG: 0.07 TABLET ORAL at 05:00

## 2022-02-04 RX ADMIN — PANTOPRAZOLE SODIUM 40 MG: 40 TABLET, DELAYED RELEASE ORAL at 09:04

## 2022-02-04 RX ADMIN — ESCITALOPRAM OXALATE 5 MG: 10 TABLET ORAL at 09:04

## 2022-02-04 RX ADMIN — INSULIN GLARGINE 2 UNITS: 100 INJECTION, SOLUTION SUBCUTANEOUS at 09:15

## 2022-02-04 RX ADMIN — HEPARIN SODIUM 5000 UNITS: 5000 INJECTION INTRAVENOUS; SUBCUTANEOUS at 04:59

## 2022-02-04 RX ADMIN — DILTIAZEM HYDROCHLORIDE 90 MG: 30 TABLET, FILM COATED ORAL at 09:04

## 2022-02-04 NOTE — PROGRESS NOTES
Department of Internal Medicine  Nephrology Progress Note      Events reviewed. For possible discharge today. SUBJECTIVE:  We are following Miss Daquan Menjivar for ESRD on HD.     PHYSICAL EXAM:      Vitals:    VITALS:  /70   Pulse 103   Temp 98 °F (36.7 °C) (Oral)   Resp 16   Ht 5' 4\" (1.626 m)   Wt 141 lb 12.1 oz (64.3 kg)   SpO2 98%   BMI 24.33 kg/m²   24HR INTAKE/OUTPUT:      Intake/Output Summary (Last 24 hours) at 2/4/2022 1159  Last data filed at 2/3/2022 1823  Gross per 24 hour   Intake 970 ml   Output    Net 970 ml       Access: RIJ tunneled dialysis catheter  Constitutional:  Lethargic, but awakens to voice  HEENT:  PERRL, normocephalic, atraumatic  Respiratory:  CTA bilaterally  Cardiovascular/Edema:  ST, RRR, no murmur gallop or rub  Gastrointestinal:  abd distended, c/o persistent abdominal pain  Neurologic:  Lethargic, awakens to voice, follows commands, no focal deficit  Skin:  Warm, dry, intact  Scheduled Meds:   pregabalin  50 mg Oral TID    ARIPiprazole  5 mg Oral Nightly    cholestyramine  1 packet Oral BID    dilTIAZem  90 mg Oral 4x Daily    escitalopram  5 mg Oral Daily    insulin glargine  2 Units SubCUTAneous QAM    insulin lispro  0-3 Units SubCUTAneous 4x Daily AC & HS    levothyroxine  75 mcg Oral Daily    mirtazapine  15 mg Oral Nightly    pantoprazole  40 mg Oral BID    rOPINIRole  0.25 mg Oral Nightly    sevelamer  800 mg Oral TID WC    sodium chloride flush  5-40 mL IntraVENous 2 times per day    heparin (porcine)  5,000 Units SubCUTAneous 3 times per day    epoetin nena-epbx  5,000 Units SubCUTAneous Once per day on Mon Wed Fri    vitamin D  50,000 Units Oral Weekly     Continuous Infusions:   dextrose      sodium chloride       PRN Meds:.zinc oxide, hydrOXYzine, glucose, dextrose, glucagon (rDNA), dextrose, sodium chloride flush, sodium chloride, ondansetron **OR** ondansetron, polyethylene glycol, acetaminophen **OR** acetaminophen, albuterol    DATA: CBC with Differential:    Lab Results   Component Value Date    WBC 4.0 02/04/2022    RBC 2.99 02/04/2022    HGB 7.9 02/04/2022    HCT 25.0 02/04/2022     02/04/2022    MCV 83.6 02/04/2022    MCH 26.4 02/04/2022    MCHC 31.6 02/04/2022    RDW 22.9 02/04/2022    NRBC 0.9 08/10/2020    SEGSPCT 67 06/27/2013    METASPCT 1.7 08/10/2020    LYMPHOPCT 20.4 02/01/2022    MONOPCT 1.8 02/01/2022    MYELOPCT 0.9 01/19/2022    BASOPCT 1.8 02/01/2022    MONOSABS 0.21 02/01/2022    LYMPHSABS 2.06 02/01/2022    EOSABS 0.09 02/01/2022    BASOSABS 0.19 02/01/2022     CMP:    Lab Results   Component Value Date     02/04/2022    K 4.4 02/04/2022    K 5.1 02/01/2022     02/04/2022    CO2 26 02/04/2022    BUN 16 02/04/2022    CREATININE 3.0 02/04/2022    GFRAA 22 02/04/2022    LABGLOM 22 02/04/2022    GLUCOSE 175 02/04/2022    GLUCOSE 130 05/18/2012    PROT 6.2 02/01/2022    LABALBU 2.7 02/01/2022    LABALBU 4.1 05/18/2012    CALCIUM 7.9 02/04/2022    BILITOT 0.4 02/01/2022    ALKPHOS 241 02/01/2022    AST 15 02/01/2022    ALT 11 02/01/2022     Magnesium:    Lab Results   Component Value Date    MG 1.6 01/25/2022     Phosphorus:    Lab Results   Component Value Date    PHOS 2.4 01/23/2022     Radiology Review:            Chest XR 2/1/22   1. Hazy bilateral perihilar airspace disease favored to represent volume   overload. 2. Patchy opacities within the lung bases which could represent atelectasis   or pneumonia. BRIEF SUMMARY OF INITIAL CONSULT:     Briefly, Miss Kurt Shields is a 36year-old female with a PMH of ESRD on hemodialysis 3 days/wk, Type I DM, poorly control on TTS schedule at [place], and recurrent admissions for DKA, HTN, gastroparesis, ascites, infective endocarditis, cardiac arrest, hypothyroidism and depression. She was recently discharged on 1/27/22 after being admitted with DKA. She presented to the ER on February 1, 2022 with complaints of chest pain and hypotension.  She was receiving

## 2022-02-04 NOTE — CARE COORDINATION
Ss note:2/4/2022.9:27 AM neg covid 2-2-22. Discharge order noted. Pt did not discharge yesterday due to late HD time and bad weather, no appropriate transport time. SW arranged [facility] transport back to [facility] [place] today at 11:00 am. Facility liaison, nursing aware.  Pt attends [facility] T Thurs, Sat at 7:00 am. HANNAH Lund

## 2022-02-04 NOTE — DISCHARGE SUMMARY
[facility] Physician Discharge Summary       [facility] [place]  [address] [place] [address] [phone]          Activity level: Up with assistance    Diet: ADULT DIET; Regular; 4 carb choices (60 gm/meal)    Labs: Routine labs per primary care physician and nephrology    Condition at discharge: Stable    Dispo: [facility]      Patient ID:  Manjinder Lewis  [id number]  66 y.o.  1990    Admit date: 2/1/2022    Discharge date and time:  2/4/2022  10:14 AM    Admission Diagnoses: Principal Problem:    Other chest pain  Active Problems:    Volume overload  Resolved Problems:    * No resolved hospital problems. *      Discharge Diagnoses: Principal Problem:    Other chest pain  Active Problems:    Volume overload  Resolved Problems:    * No resolved hospital problems. *      Consults:  IP CONSULT TO NEPHROLOGY  IP CONSULT TO SOCIAL WORK  IP CONSULT TO PAIN MANAGEMENT    Procedures: None    Hospital Course: This is a 24-year-old female that was admitted to the hospital with atypical left-sided chest pain during hemodialysis and hemodialysis associated hypotension. She has a history of end-stage renal disease on hemodialysis, previously on peritoneal dialysis, as well as uncontrolled type 1 diabetes mellitus. In the ED, EKG with nonspecific previously noted changes but did not show any acute ischemic changes. Follow-up EKG in the morning was also consistent with this. Troponin was somewhat elevated, but lower than her recent admission last month and the trend on repeat was downward. Chest pain spontaneously resolved and did not recur. Also to note, patient was specifically requesting IV pain medications by name including Dilaudid and fentanyl. Last month, she was seen by pain management at [facility] and they recommended against any narcotic medications unless there is a clear indication.   Patient was complaining of chronic pain all over, and states it makes her anxiety worse. Pain management assessed her again in the hospital and recommended increasing Lyrica to 3 times daily    Discharge Exam:  Vitals:    02/03/22 1815 02/03/22 2000 02/04/22 0000 02/04/22 0746   BP: 127/78 113/78  110/70   Pulse: 88 88 88 103   Resp: 14 16  16   Temp: 97.8 °F (36.6 °C) 97.8 °F (36.6 °C)  98 °F (36.7 °C)   TempSrc:  Oral  Oral   SpO2:  99%  98%   Weight: 141 lb 12.1 oz (64.3 kg)      Height:           General Appearance: alert and oriented to person, place and time. Appears frustrated but not in distress   Skin: warm and dry  Head: normocephalic and atraumatic  Eyes: pupils equal, round, and reactive to light, extraocular eye movements intact, conjunctivae normal  Neck: neck supple and non tender without mass   Pulmonary/Chest: Non-labored on room air. Clear to auscultation bilaterally   Cardiovascular: normal rate, normal S1 and S2 with flow murmur and no carotid bruits. No appreciable JVD  Abdomen: soft, non-tender, mildly distended, normal bowel sounds, no masses or organomegaly  Extremities: no cyanosis, no clubbing and no edema  Neurologic: no cranial nerve deficit and speech normal  I/O last 3 completed shifts: In: 5961 [P.O.:1080; I.V.:10]  Out: -   No intake/output data recorded. LABS:  Recent Labs     02/03/22  1138 02/04/22  0812    137   K 4.8 4.4    104   CO2 20* 26   BUN 27* 16   CREATININE 4.5* 3.0*   GLUCOSE 195* 175*   CALCIUM 7.9* 7.9*       Recent Labs     02/03/22  1138 02/04/22  0812   WBC 5.2 4.0*   RBC 3.03* 2.99*   HGB 8.1* 7.9*   HCT 25.4* 25.0*   MCV 83.8 83.6   MCH 26.7 26.4   MCHC 31.9* 31.6*   RDW 23.0* 22.9*   * 107*   MPV 11.5 NOT CALC       No results for input(s): POCGLU in the last 72 hours.       Imaging:  XR CHEST PORTABLE    Result Date: 2/1/2022  EXAMINATION: ONE XRAY VIEW OF THE CHEST 2/1/2022 9:31 am COMPARISON: 1/22/2022 HISTORY: ORDERING SYSTEM PROVIDED HISTORY: chest pain TECHNOLOGIST PROVIDED HISTORY: Reason for exam:->chest pain FINDINGS: The cardiac silhouette is mildly enlarged. Note is made of a right dialysis catheter. Hazy bilateral perihilar airspace disease is noted favored to represent volume overload. There are persistent patchy opacities within the right and left lung bases favored to represent pneumonia or atelectasis. There is no gross pleural effusion. 1. Hazy bilateral perihilar airspace disease favored to represent volume overload. 2. Patchy opacities within the lung bases which could represent atelectasis or pneumonia. Patient Instructions:   Current Discharge Medication List      START taking these medications    Details   epoetin nena-epbx (RETACRIT) 90009 UNIT/ML SOLN injection Inject 0.5 mLs into the skin three times a week  Qty: 6.6 mL         CONTINUE these medications which have CHANGED    Details   pregabalin (LYRICA) 50 MG capsule Take 1 capsule by mouth 3 times daily for 30 days.   Qty: 90 capsule, Refills: 0    Associated Diagnoses: Uncontrolled type 1 diabetes mellitus with hyperglycemia ([facility]         CONTINUE these medications which have NOT CHANGED    Details   !! sevelamer (RENVELA) 800 MG tablet Take 2 tablets by mouth 3 times daily (with meals)      insulin lispro (HUMALOG) 100 UNIT/ML injection vial Inject 0-3 Units into the skin 4 times daily (before meals and nightly)  Qty: 10 mL, Refills: 3      escitalopram (LEXAPRO) 5 MG tablet Take 1 tablet by mouth daily  Qty: 30 tablet, Refills: 3      dilTIAZem (CARDIZEM) 90 MG tablet Take 1 tablet by mouth 4 times daily Hold the morning of Hemodialysis  Hold For SBP <100, HR<55  Qty: 120 tablet, Refills: 3      insulin glargine (LANTUS) 100 UNIT/ML injection vial Inject 1 Units into the skin every morning  Qty: 10 mL, Refills: 3      cholestyramine (QUESTRAN) 4 g packet Take 1 packet by mouth 2 times daily  Qty: 90 packet, Refills: 0      mirtazapine (REMERON) 15 MG tablet Take 15 mg by mouth nightly      ARIPiprazole (ABILIFY) 5 MG tablet Take 5 mg by mouth nightly      levothyroxine (SYNTHROID) 75 MCG tablet TAKE 1 TABLET BY MOUTH IN THE MORNING BEFORE BREAKFAST  Qty: 60 tablet, Refills: 0      rOPINIRole (REQUIP) 0.25 MG tablet Take 0.25 mg by mouth nightly      hydrOXYzine (VISTARIL) 25 MG capsule Take 1 capsule by mouth 3 times daily as needed for Anxiety (sleep)  Qty: 1 capsule, Refills: 0      Nutritional Supplements (GLUCOSE MANAGEMENT) TABS Take 1 tablet by mouth as needed (for blood glucose <70 or symptomatic low blood glucose)  Qty: 60 tablet, Refills: 0      promethazine (PHENERGAN) 25 MG tablet Take 25 mg by mouth every 6 hours as needed for Nausea      ondansetron (ZOFRAN) 4 MG tablet Take 4 mg by mouth every 8 hours as needed for Nausea or Vomiting      albuterol sulfate  (90 Base) MCG/ACT inhaler Inhale 4 puffs into the lungs as needed for Wheezing or Shortness of Breath  Qty: 18 g, Refills: 0      !! sevelamer (RENVELA) 800 MG tablet Take 800 mg by mouth daily At bedtime for supplement      pantoprazole (PROTONIX) 40 MG tablet Take 1 tablet by mouth 2 times daily  Qty: 30 tablet, Refills: 5    Associated Diagnoses: Gastroparesis      docusate sodium (COLACE) 100 MG capsule Take 1 capsule by mouth daily  Qty: 30 capsule, Refills: 0      sennosides-docusate sodium (SENOKOT-S) 8.6-50 MG tablet Take 2 tablets by mouth nightly as needed for Constipation      vitamin D (ERGOCALCIFEROL) 1.25 MG (84749 UT) CAPS capsule Take 1 capsule by mouth once a week  Qty: 5 capsule, Refills: 0      polyethylene glycol (GLYCOLAX) 17 g packet Take 17 g by mouth daily as needed for Constipation       !! - Potential duplicate medications found. Please discuss with provider.       STOP taking these medications       omeprazole (PRILOSEC) 20 MG delayed release capsule Comments:   Reason for Stopping:                 Note that greater than 30 minutes was spent in preparing discharge papers, discussing discharge with patient, medication review, etc.    NOTE: This report was transcribed using voice recognition software. Every effort was made to ensure accuracy; however, inadvertent computerized transcription errors may be present.      Signed:  Electronically signed by Aiden Santiago DO on 2/4/2022 at 10:14 AM

## 2022-02-14 ENCOUNTER — TELEPHONE (OUTPATIENT)
Dept: INTERNAL MEDICINE | Age: 32
End: 2022-02-14

## 2022-02-14 NOTE — TELEPHONE ENCOUNTER
----- Message from Marko Chacon sent at 2/14/2022  1:22 PM EST -----  Subject: Message to Provider    QUESTIONS  Information for Provider? Keila Kate from [place] was admitted to [facility] in [place] 2/10/22 for Pneumonia. Keila Kate   [phone]  ---------------------------------------------------------------------------  --------------  Stephanie FRIEND  What is the best way for the office to contact you? OK to leave message on   voicemail  Preferred Call Back Phone Number? [phone]  ---------------------------------------------------------------------------  --------------  SCRIPT ANSWERS  Relationship to Patient? Third Party  Representative Name?  Keila Kate ( [place])

## 2022-02-15 ENCOUNTER — TELEPHONE (OUTPATIENT)
Dept: INTERNAL MEDICINE | Age: 32
End: 2022-02-15

## 2022-02-15 NOTE — TELEPHONE ENCOUNTER
[facility] CALLED STATES THAT PT WAS D/C BACK TO NURSING FACILITY [facility] # IF NEEDED [phone]

## 2022-02-22 RX ORDER — HYDROXYZINE HYDROCHLORIDE 25 MG/1
25 TABLET, FILM COATED ORAL EVERY 8 HOURS PRN
COMMUNITY

## 2022-02-22 RX ORDER — OMEPRAZOLE 20 MG/1
20 CAPSULE, DELAYED RELEASE ORAL NIGHTLY
Status: ON HOLD | COMMUNITY
End: 2022-04-15 | Stop reason: HOSPADM

## 2022-02-22 NOTE — PROGRESS NOTES
[address] [facility]        Date: 2/22/2022    Date of surgery: 2/23/22   Arrival Time:  2:10 pm in [facility]     1. Do not eat or drink anything after midnight and 8 hours  prior to surgery. This includes no water, chewing gum, mints or ice chips. 2. Take the following medications with a small sip of water on the morning of Surgery: Inhaler prn, Diltiazem, Pregabalin, Lexapro     3. Diabetics may take evening dose of insulin but none after midnight. If you feel symptomatic or low blood sugar morning of surgery drink 1-2 ounces of apple juice only. 4. Aspirin, Ibuprofen, Advil, Naproxen, Vitamin E and other Anti-inflammatory products should be stopped  before surgery  as directed by your physician. Take Tylenol only unless instructed otherwise by your surgeon. 5. Check with your Doctor regarding stopping Plavix, Coumadin, Lovenox, Eliquis, Effient, or other blood thinners. 6. Do not smoke,use illicit drugs and do not drink any alcoholic beverages 24 hours prior to surgery. 7. You may brush your teeth the morning of surgery. DO NOT SWALLOW WATER    8. You MUST make arrangements for a responsible adult to take you home after your surgery. You will not be allowed to leave alone or drive yourself home. It is strongly suggested someone stay with you the first 24 hrs. Your surgery will be cancelled if you do not have a ride home. 9. PEDIATRIC PATIENTS ONLY:  A parent/legal guardian must accompany a child scheduled for surgery and plan to stay at the hospital until the child is discharged. Please do not bring other children with you.     10. Please wear simple, loose fitting clothing to the hospital.  [place] not bring valuables (money, credit cards, checkbooks, etc.) Do not wear any makeup (including no eye makeup) or nail polish on your fingers or toes. 11. DO NOT wear any jewelry or piercings on day of surgery. All body piercing jewelry must be removed. 12. Shower the night before surgery with _x__Antibacterial soap /GILMA WIPES________    13. TOTAL JOINT REPLACEMENT/HYSTERECTOMY PATIENTS ONLY---Remember to bring Blood Bank bracelet to the hospital on the day of surgery. 14. If you have a Living Will and Durable Power of  for Healthcare, please bring in a copy. 15. If appropriate bring crutches, inspirex, WALKER, CANE etc... 12. Notify your Surgeon if you develop any illness between now and surgery time, cough, cold, fever, sore throat, nausea, vomiting, etc.  Please notify your surgeon if you experience dizziness, shortness of breath or blurred vision between now & the time of your surgery. 17. If you have ___dentures, they will be removed before going to the OR; we will provide you a container. If you wear ___contact lenses or ___glasses, they will be removed; please bring a case for them. 18. To provide excellent care visitors will be limited to 1 in the room at any given time. 19. Please bring picture ID and insurance card. 20. Sleep apnea patients need to bring CPAP AND SETTINGS to hospital on day of surgery. 21. During flu season no children under the age of 15 are permitted in the hospital for the safety of all patients. 22. Other                  Please call AMBULATORY CARE if you have any further questions.    [address] [facility]     [address]

## 2022-03-01 NOTE — PROGRESS NOTES
Spoke to Umu at [facility], Pt is scheduled for transportation at 1PM for EGD on 3/3/22. Pt. Will get COVID test today and bring results to hospital tomorrow. Umu is aware of preop instructions.

## 2022-03-02 ENCOUNTER — HOSPITAL ENCOUNTER (OUTPATIENT)
Age: 32
Setting detail: OBSERVATION
Discharge: NURSING, REHAB OR LONG-TERM CARE | End: 2022-03-04
Attending: INTERNAL MEDICINE | Admitting: STUDENT IN AN ORGANIZED HEALTH CARE EDUCATION/TRAINING PROGRAM
Payer: COMMERCIAL

## 2022-03-02 ENCOUNTER — ANESTHESIA (OUTPATIENT)
Dept: ENDOSCOPY | Age: 32
End: 2022-03-02
Payer: COMMERCIAL

## 2022-03-02 ENCOUNTER — ANESTHESIA EVENT (OUTPATIENT)
Dept: ENDOSCOPY | Age: 32
End: 2022-03-02
Payer: COMMERCIAL

## 2022-03-02 ENCOUNTER — APPOINTMENT (OUTPATIENT)
Dept: GENERAL RADIOLOGY | Age: 32
End: 2022-03-02
Attending: INTERNAL MEDICINE
Payer: COMMERCIAL

## 2022-03-02 PROBLEM — R73.9 HYPERGLYCEMIA: Status: ACTIVE | Noted: 2022-03-02

## 2022-03-02 LAB
ANION GAP SERPL CALCULATED.3IONS-SCNC: 14 MMOL/L (ref 7–16)
ANION GAP SERPL CALCULATED.3IONS-SCNC: 15 MMOL/L (ref 7–16)
BASOPHILS ABSOLUTE: 0.08 E9/L (ref 0–0.2)
BASOPHILS RELATIVE PERCENT: 1.3 % (ref 0–2)
BETA-HYDROXYBUTYRATE: 2.05 MMOL/L (ref 0.02–0.27)
BUN BLDV-MCNC: 20 MG/DL (ref 6–20)
BUN BLDV-MCNC: 22 MG/DL (ref 6–20)
CALCIUM SERPL-MCNC: 8.2 MG/DL (ref 8.6–10.2)
CALCIUM SERPL-MCNC: 8.4 MG/DL (ref 8.6–10.2)
CHLORIDE BLD-SCNC: 95 MMOL/L (ref 98–107)
CHLORIDE BLD-SCNC: 98 MMOL/L (ref 98–107)
CO2: 24 MMOL/L (ref 22–29)
CO2: 24 MMOL/L (ref 22–29)
CREAT SERPL-MCNC: 3.2 MG/DL (ref 0.5–1)
CREAT SERPL-MCNC: 3.4 MG/DL (ref 0.5–1)
EOSINOPHILS ABSOLUTE: 0.09 E9/L (ref 0.05–0.5)
EOSINOPHILS RELATIVE PERCENT: 1.5 % (ref 0–6)
GFR AFRICAN AMERICAN: 19
GFR AFRICAN AMERICAN: 21
GFR NON-AFRICAN AMERICAN: 19 ML/MIN/1.73
GFR NON-AFRICAN AMERICAN: 21 ML/MIN/1.73
GLUCOSE BLD-MCNC: 486 MG/DL (ref 74–99)
GLUCOSE BLD-MCNC: 588 MG/DL (ref 74–99)
HBA1C MFR BLD: 7.7 % (ref 4–5.6)
HCG QUALITATIVE: NEGATIVE
HCT VFR BLD CALC: 24 % (ref 34–48)
HEMOGLOBIN: 7.4 G/DL (ref 11.5–15.5)
IMMATURE GRANULOCYTES #: 0.02 E9/L
IMMATURE GRANULOCYTES %: 0.3 % (ref 0–5)
LYMPHOCYTES ABSOLUTE: 1.11 E9/L (ref 1.5–4)
LYMPHOCYTES RELATIVE PERCENT: 18.3 % (ref 20–42)
MAGNESIUM: 2 MG/DL (ref 1.6–2.6)
MCH RBC QN AUTO: 28.6 PG (ref 26–35)
MCHC RBC AUTO-ENTMCNC: 30.8 % (ref 32–34.5)
MCV RBC AUTO: 92.7 FL (ref 80–99.9)
METER GLUCOSE: 427 MG/DL (ref 74–99)
METER GLUCOSE: 97 MG/DL (ref 74–99)
METER GLUCOSE: >500 MG/DL (ref 74–99)
METER GLUCOSE: >500 MG/DL (ref 74–99)
MONOCYTES ABSOLUTE: 0.19 E9/L (ref 0.1–0.95)
MONOCYTES RELATIVE PERCENT: 3.1 % (ref 2–12)
NEUTROPHILS ABSOLUTE: 4.58 E9/L (ref 1.8–7.3)
NEUTROPHILS RELATIVE PERCENT: 75.5 % (ref 43–80)
PDW BLD-RTO: 18.2 FL (ref 11.5–15)
PH VENOUS: 7.34 (ref 7.35–7.45)
PLATELET # BLD: 249 E9/L (ref 130–450)
PMV BLD AUTO: 11.7 FL (ref 7–12)
POTASSIUM REFLEX MAGNESIUM: 3.5 MMOL/L (ref 3.5–5)
POTASSIUM REFLEX MAGNESIUM: 5.9 MMOL/L (ref 3.5–5)
RBC # BLD: 2.59 E12/L (ref 3.5–5.5)
SODIUM BLD-SCNC: 133 MMOL/L (ref 132–146)
SODIUM BLD-SCNC: 137 MMOL/L (ref 132–146)
WBC # BLD: 6.1 E9/L (ref 4.5–11.5)

## 2022-03-02 PROCEDURE — 83735 ASSAY OF MAGNESIUM: CPT

## 2022-03-02 PROCEDURE — 84703 CHORIONIC GONADOTROPIN ASSAY: CPT

## 2022-03-02 PROCEDURE — 87449 NOS EACH ORGANISM AG IA: CPT

## 2022-03-02 PROCEDURE — 99219 PR INITIAL OBSERVATION CARE/DAY 50 MINUTES: CPT | Performed by: STUDENT IN AN ORGANIZED HEALTH CARE EDUCATION/TRAINING PROGRAM

## 2022-03-02 PROCEDURE — 36415 COLL VENOUS BLD VENIPUNCTURE: CPT

## 2022-03-02 PROCEDURE — 85025 COMPLETE CBC W/AUTO DIFF WBC: CPT

## 2022-03-02 PROCEDURE — 82800 BLOOD PH: CPT

## 2022-03-02 PROCEDURE — 87493 C DIFF AMPLIFIED PROBE: CPT

## 2022-03-02 PROCEDURE — 2580000003 HC RX 258: Performed by: ANESTHESIOLOGY

## 2022-03-02 PROCEDURE — 80048 BASIC METABOLIC PNL TOTAL CA: CPT

## 2022-03-02 PROCEDURE — 82962 GLUCOSE BLOOD TEST: CPT

## 2022-03-02 PROCEDURE — G0378 HOSPITAL OBSERVATION PER HR: HCPCS

## 2022-03-02 PROCEDURE — 87324 CLOSTRIDIUM AG IA: CPT

## 2022-03-02 PROCEDURE — 90935 HEMODIALYSIS ONE EVALUATION: CPT

## 2022-03-02 PROCEDURE — 6370000000 HC RX 637 (ALT 250 FOR IP): Performed by: STUDENT IN AN ORGANIZED HEALTH CARE EDUCATION/TRAINING PROGRAM

## 2022-03-02 PROCEDURE — 6360000002 HC RX W HCPCS: Performed by: STUDENT IN AN ORGANIZED HEALTH CARE EDUCATION/TRAINING PROGRAM

## 2022-03-02 PROCEDURE — 2580000003 HC RX 258: Performed by: STUDENT IN AN ORGANIZED HEALTH CARE EDUCATION/TRAINING PROGRAM

## 2022-03-02 PROCEDURE — 96372 THER/PROPH/DIAG INJ SC/IM: CPT

## 2022-03-02 PROCEDURE — 82010 KETONE BODYS QUAN: CPT

## 2022-03-02 PROCEDURE — 83036 HEMOGLOBIN GLYCOSYLATED A1C: CPT

## 2022-03-02 RX ORDER — SODIUM CHLORIDE 0.9 % (FLUSH) 0.9 %
5-40 SYRINGE (ML) INJECTION EVERY 12 HOURS SCHEDULED
Status: DISCONTINUED | OUTPATIENT
Start: 2022-03-02 | End: 2022-03-04 | Stop reason: HOSPADM

## 2022-03-02 RX ORDER — CHOLESTYRAMINE 4 G/9G
1 POWDER, FOR SUSPENSION ORAL 2 TIMES DAILY
Status: DISCONTINUED | OUTPATIENT
Start: 2022-03-02 | End: 2022-03-04 | Stop reason: HOSPADM

## 2022-03-02 RX ORDER — LEVOTHYROXINE SODIUM 0.07 MG/1
75 TABLET ORAL DAILY
Status: DISCONTINUED | OUTPATIENT
Start: 2022-03-03 | End: 2022-03-04 | Stop reason: HOSPADM

## 2022-03-02 RX ORDER — ONDANSETRON 2 MG/ML
4 INJECTION INTRAMUSCULAR; INTRAVENOUS EVERY 6 HOURS PRN
Status: DISCONTINUED | OUTPATIENT
Start: 2022-03-02 | End: 2022-03-04 | Stop reason: HOSPADM

## 2022-03-02 RX ORDER — SODIUM CHLORIDE 0.9 % (FLUSH) 0.9 %
5-40 SYRINGE (ML) INJECTION PRN
Status: DISCONTINUED | OUTPATIENT
Start: 2022-03-02 | End: 2022-03-02 | Stop reason: SDUPTHER

## 2022-03-02 RX ORDER — PREGABALIN 50 MG/1
50 CAPSULE ORAL 3 TIMES DAILY
Status: DISCONTINUED | OUTPATIENT
Start: 2022-03-02 | End: 2022-03-04 | Stop reason: HOSPADM

## 2022-03-02 RX ORDER — INSULIN GLARGINE 100 [IU]/ML
1 INJECTION, SOLUTION SUBCUTANEOUS EVERY MORNING
Status: DISCONTINUED | OUTPATIENT
Start: 2022-03-03 | End: 2022-03-04

## 2022-03-02 RX ORDER — ACETAMINOPHEN 325 MG/1
650 TABLET ORAL EVERY 6 HOURS PRN
Status: DISCONTINUED | OUTPATIENT
Start: 2022-03-02 | End: 2022-03-04 | Stop reason: HOSPADM

## 2022-03-02 RX ORDER — DOCUSATE SODIUM 100 MG/1
100 CAPSULE, LIQUID FILLED ORAL DAILY PRN
Status: DISCONTINUED | OUTPATIENT
Start: 2022-03-02 | End: 2022-03-04 | Stop reason: HOSPADM

## 2022-03-02 RX ORDER — POLYETHYLENE GLYCOL 3350 17 G/17G
17 POWDER, FOR SOLUTION ORAL DAILY PRN
Status: DISCONTINUED | OUTPATIENT
Start: 2022-03-02 | End: 2022-03-04 | Stop reason: HOSPADM

## 2022-03-02 RX ORDER — ACETAMINOPHEN 650 MG/1
650 SUPPOSITORY RECTAL EVERY 6 HOURS PRN
Status: DISCONTINUED | OUTPATIENT
Start: 2022-03-02 | End: 2022-03-04 | Stop reason: HOSPADM

## 2022-03-02 RX ORDER — DEXTROSE MONOHYDRATE 50 MG/ML
100 INJECTION, SOLUTION INTRAVENOUS PRN
Status: DISCONTINUED | OUTPATIENT
Start: 2022-03-02 | End: 2022-03-04 | Stop reason: HOSPADM

## 2022-03-02 RX ORDER — SODIUM CHLORIDE 0.9 % (FLUSH) 0.9 %
5-40 SYRINGE (ML) INJECTION PRN
Status: DISCONTINUED | OUTPATIENT
Start: 2022-03-02 | End: 2022-03-04 | Stop reason: HOSPADM

## 2022-03-02 RX ORDER — HEPARIN SODIUM 5000 [USP'U]/ML
5000 INJECTION, SOLUTION INTRAVENOUS; SUBCUTANEOUS EVERY 8 HOURS SCHEDULED
Status: DISCONTINUED | OUTPATIENT
Start: 2022-03-02 | End: 2022-03-04 | Stop reason: HOSPADM

## 2022-03-02 RX ORDER — SODIUM CHLORIDE 9 MG/ML
INJECTION, SOLUTION INTRAVENOUS ONCE
Status: COMPLETED | OUTPATIENT
Start: 2022-03-02 | End: 2022-03-02

## 2022-03-02 RX ORDER — SODIUM CHLORIDE 0.9 % (FLUSH) 0.9 %
5-40 SYRINGE (ML) INJECTION EVERY 12 HOURS SCHEDULED
Status: DISCONTINUED | OUTPATIENT
Start: 2022-03-02 | End: 2022-03-02 | Stop reason: SDUPTHER

## 2022-03-02 RX ORDER — SEVELAMER CARBONATE 800 MG/1
1600 TABLET, FILM COATED ORAL
Status: DISCONTINUED | OUTPATIENT
Start: 2022-03-02 | End: 2022-03-04

## 2022-03-02 RX ORDER — HYDROXYZINE HYDROCHLORIDE 25 MG/1
25 TABLET, FILM COATED ORAL EVERY 8 HOURS PRN
Status: DISCONTINUED | OUTPATIENT
Start: 2022-03-02 | End: 2022-03-04 | Stop reason: HOSPADM

## 2022-03-02 RX ORDER — DEXTROSE MONOHYDRATE 25 G/50ML
12.5 INJECTION, SOLUTION INTRAVENOUS PRN
Status: DISCONTINUED | OUTPATIENT
Start: 2022-03-02 | End: 2022-03-02

## 2022-03-02 RX ORDER — MIRTAZAPINE 15 MG/1
15 TABLET, FILM COATED ORAL NIGHTLY
Status: DISCONTINUED | OUTPATIENT
Start: 2022-03-02 | End: 2022-03-04 | Stop reason: HOSPADM

## 2022-03-02 RX ORDER — SODIUM CHLORIDE 9 MG/ML
25 INJECTION, SOLUTION INTRAVENOUS PRN
Status: DISCONTINUED | OUTPATIENT
Start: 2022-03-02 | End: 2022-03-04 | Stop reason: HOSPADM

## 2022-03-02 RX ORDER — ROPINIROLE 0.25 MG/1
0.25 TABLET, FILM COATED ORAL NIGHTLY
Status: DISCONTINUED | OUTPATIENT
Start: 2022-03-02 | End: 2022-03-04 | Stop reason: HOSPADM

## 2022-03-02 RX ORDER — SENNA AND DOCUSATE SODIUM 50; 8.6 MG/1; MG/1
2 TABLET, FILM COATED ORAL NIGHTLY PRN
Status: DISCONTINUED | OUTPATIENT
Start: 2022-03-02 | End: 2022-03-04 | Stop reason: HOSPADM

## 2022-03-02 RX ORDER — ALBUTEROL SULFATE 90 UG/1
4 AEROSOL, METERED RESPIRATORY (INHALATION) PRN
Status: DISCONTINUED | OUTPATIENT
Start: 2022-03-02 | End: 2022-03-02

## 2022-03-02 RX ORDER — PANTOPRAZOLE SODIUM 40 MG/1
40 TABLET, DELAYED RELEASE ORAL
Status: DISCONTINUED | OUTPATIENT
Start: 2022-03-03 | End: 2022-03-04 | Stop reason: HOSPADM

## 2022-03-02 RX ORDER — NICOTINE POLACRILEX 4 MG
15 LOZENGE BUCCAL PRN
Status: DISCONTINUED | OUTPATIENT
Start: 2022-03-02 | End: 2022-03-04 | Stop reason: HOSPADM

## 2022-03-02 RX ORDER — ESCITALOPRAM OXALATE 10 MG/1
5 TABLET ORAL DAILY
Status: DISCONTINUED | OUTPATIENT
Start: 2022-03-03 | End: 2022-03-04

## 2022-03-02 RX ORDER — SODIUM CHLORIDE 9 MG/ML
25 INJECTION, SOLUTION INTRAVENOUS PRN
Status: DISCONTINUED | OUTPATIENT
Start: 2022-03-02 | End: 2022-03-02 | Stop reason: SDUPTHER

## 2022-03-02 RX ORDER — SODIUM CHLORIDE 9 MG/ML
25 INJECTION, SOLUTION INTRAVENOUS PRN
Status: DISCONTINUED | OUTPATIENT
Start: 2022-03-02 | End: 2022-03-04 | Stop reason: SDUPTHER

## 2022-03-02 RX ORDER — ONDANSETRON 4 MG/1
4 TABLET, ORALLY DISINTEGRATING ORAL EVERY 8 HOURS PRN
Status: DISCONTINUED | OUTPATIENT
Start: 2022-03-02 | End: 2022-03-04 | Stop reason: HOSPADM

## 2022-03-02 RX ORDER — ARIPIPRAZOLE 5 MG/1
5 TABLET ORAL NIGHTLY
Status: DISCONTINUED | OUTPATIENT
Start: 2022-03-02 | End: 2022-03-04 | Stop reason: HOSPADM

## 2022-03-02 RX ORDER — PREGABALIN 50 MG/1
50 CAPSULE ORAL 2 TIMES DAILY
Status: DISCONTINUED | OUTPATIENT
Start: 2022-03-02 | End: 2022-03-02

## 2022-03-02 RX ORDER — SODIUM CHLORIDE 9 MG/ML
INJECTION, SOLUTION INTRAVENOUS CONTINUOUS
Status: DISCONTINUED | OUTPATIENT
Start: 2022-03-02 | End: 2022-03-04 | Stop reason: HOSPADM

## 2022-03-02 RX ORDER — PROMETHAZINE HYDROCHLORIDE 25 MG/1
25 TABLET ORAL EVERY 6 HOURS PRN
Status: DISCONTINUED | OUTPATIENT
Start: 2022-03-02 | End: 2022-03-04 | Stop reason: HOSPADM

## 2022-03-02 RX ORDER — ALBUTEROL SULFATE 2.5 MG/3ML
2.5 SOLUTION RESPIRATORY (INHALATION) EVERY 4 HOURS PRN
Status: DISCONTINUED | OUTPATIENT
Start: 2022-03-02 | End: 2022-03-04 | Stop reason: HOSPADM

## 2022-03-02 RX ADMIN — DILTIAZEM HYDROCHLORIDE 90 MG: 30 TABLET, FILM COATED ORAL at 22:26

## 2022-03-02 RX ADMIN — ONDANSETRON 4 MG: 2 INJECTION INTRAMUSCULAR; INTRAVENOUS at 15:49

## 2022-03-02 RX ADMIN — MIRTAZAPINE 15 MG: 15 TABLET, FILM COATED ORAL at 22:14

## 2022-03-02 RX ADMIN — ARIPIPRAZOLE 5 MG: 5 TABLET ORAL at 22:26

## 2022-03-02 RX ADMIN — INSULIN LISPRO 5 UNITS: 100 INJECTION, SOLUTION INTRAVENOUS; SUBCUTANEOUS at 18:41

## 2022-03-02 RX ADMIN — HYDROXYZINE HYDROCHLORIDE 25 MG: 25 TABLET ORAL at 22:15

## 2022-03-02 RX ADMIN — ROPINIROLE HYDROCHLORIDE 0.25 MG: 0.25 TABLET, FILM COATED ORAL at 22:26

## 2022-03-02 RX ADMIN — SODIUM CHLORIDE: 9 INJECTION, SOLUTION INTRAVENOUS at 17:18

## 2022-03-02 RX ADMIN — HEPARIN SODIUM 5000 UNITS: 5000 INJECTION INTRAVENOUS; SUBCUTANEOUS at 17:18

## 2022-03-02 RX ADMIN — PREGABALIN 50 MG: 50 CAPSULE ORAL at 22:14

## 2022-03-02 RX ADMIN — SODIUM CHLORIDE: 9 INJECTION, SOLUTION INTRAVENOUS at 13:52

## 2022-03-02 RX ADMIN — SEVELAMER CARBONATE 1600 MG: 800 TABLET, FILM COATED ORAL at 17:18

## 2022-03-02 RX ADMIN — INSULIN LISPRO 10 UNITS: 100 INJECTION, SOLUTION INTRAVENOUS; SUBCUTANEOUS at 16:10

## 2022-03-02 ASSESSMENT — PAIN - FUNCTIONAL ASSESSMENT: PAIN_FUNCTIONAL_ASSESSMENT: 0-10

## 2022-03-02 ASSESSMENT — PAIN SCALES - GENERAL
PAINLEVEL_OUTOF10: 0

## 2022-03-02 NOTE — PROGRESS NOTES
Dr. Abdullahi Aguirre notified of  - Dr. Abdullahi Aguirre states procedure cancelled. Christine Swanson in [facility] notified of cancellation.

## 2022-03-02 NOTE — H&P
GI H&P NOTE     [facility] and Aleksandar Rojas M.D., Dr. Starr Saeed M.D., Dr. Porfirio Muñoz D.O., Dr. Kiersten Fox M.D., Dr. Alisha López D.O., Dr. Saeed CISSE ORolando               03/02/22  1:02 PM  Wilton Flores  36 y.o.  female       HPI:  35 yo bf with h/o diabetic gastroparesis, dialysis and h/o poor glycemic control who presents for botox injection into pylorus. Major complaint is N/V and poor appetite.          PAST MEDICAL Hx:  Active Ambulatory Problems     Diagnosis Date Noted    Non compliance w medication regimen 03/30/2016    Tobacco smoking complicating pregnancy 13/57/8788    MTHFR mutation 07/19/2016    Generalized abdominal pain 09/27/2017    Diabetic ketoacidosis without coma associated with type 1 diabetes mellitus ([address] 03/18/2018    Hypertension 07/17/2018    Prolonged QT interval 08/17/2018    Iron deficiency anemia 10/01/2019    Sinus tachycardia     Bladder dysfunction     Hypokalemia     Severe protein-calorie malnutrition ([address] 02/11/2020    Uncontrolled type 1 diabetes mellitus with hyperglycemia ([address] 10/08/2020    End stage renal disease ([address] 10/08/2020    Hypothyroidism 10/08/2020    Hyperlipidemia 10/08/2020    Acute cystitis 10/10/2020    Nondisplaced fracture of neck of fifth metacarpal bone, left hand, initial encounter for closed fracture     Chronic right-sided low back pain     Endocarditis 10/31/2020    Seizure ([address] 11/20/2020    Hyperkalemia 12/26/2020    Hypomagnesemia 05/14/2021    Hypocalcemia 05/14/2021    Intractable nausea and vomiting 05/14/2021    Wound of left leg, sequela 05/14/2021    Syncope 05/14/2021    Type 1 diabetes mellitus without complication ([address] 64/38/7409    Hyperosmolality 06/22/2021    Major depressive disorder 06/22/2021    Lactic acid acidosis 07/10/2021    Early satiety     Acute on chronic systolic CHF (congestive heart failure) ([address] 08/25/2021    Other chest pain 08/26/2021    Chronic renal failure syndrome 09/13/2021    Septic shock ([address] 09/28/2021    ESRD (end stage renal disease) ([address] 09/28/2021    Hyperosmolar hyperglycemic state (HHS) ([address] 11/11/2021    Hypertensive urgency 11/11/2021    Nausea 11/13/2021    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) ([address]     ESRD (end stage renal disease) on dialysis ([address]     AMS (altered mental status) 11/23/2021    Opioid overdose ([address] 11/23/2021    Anemia     Type 1 diabetes mellitus with chronic kidney disease on chronic dialysis ([address]     Elevated TSH 11/25/2021    Diabetic acidosis without coma ([address] 01/01/2022    DKA, type 1, not at goal [facility]) 01/01/2022    COVID-19 virus infection 01/03/2022    Frequent hospital admissions 01/24/2022    Diabetic gastroparesis ([address] 01/24/2022    Diffuse pain 01/25/2022    Poor prognosis 01/26/2022    Physical deconditioning 01/26/2022    Declining functional status 01/26/2022    Lack of motivation 01/26/2022    Drug-seeking behavior 01/26/2022    Behavior problem, adult 01/27/2022    Volume overload 02/01/2022     Resolved Ambulatory Problems     Diagnosis Date Noted    DKA (diabetic ketoacidoses) 08/22/2011    Diabetic ketoacidosis ([address] 08/27/2011    Pregnant state, incidental 12/15/2011    Acute renal failure ([address] 08/04/2012    Noncompliance 08/04/2012    UTI (urinary tract infection) 09/23/2012    Diabetes mellitus ([address] 09/25/2012    Type 1 diabetes mellitus complicating pregnancy, antepartum 06/10/2013    Pre-existing type 1 diabetes mellitus during pregnancy 06/10/2013    High-risk pregnancy 06/10/2013    Polyhydramnios in second trimester 06/14/2013    Diabetic ketoacidosis with coma associated with type 1 diabetes mellitus ([address] 06/26/2013    Previous gestational diabetes mellitus, antepartum 02/08/2016    Previous stillbirth or demise, antepartum 02/08/2016    Tobacco use, maternal 02/08/2016    Pregnancy 03/30/2016    UTI (urinary tract infection) 2016    Dyspnea on exertion 2016    30 weeks gestation of pregnancy     Diabetes mellitus, antepartum     Mental disorder complicating pregnancy     Drug fetal damage suspected in pregnancy     Cannabis dependence ([address]     Drug use complicating pregnancy in third trimester     Opioid dependence in remission ([address]     Suspected shortening of cervix not found     Encounter for fetal anatomic survey     Pregnancy with 31 completed weeks gestation     Pre-existing type 1 diabetes mellitus during pregnancy in third trimester     Trichomonal vaginitis during pregnancy in first trimester 2016    Poorly controlled type 1 diabetes mellitus ([address] 2016    Severe pre-eclampsia in third trimester 2016    Diabetes mellitus type 1, uncontrolled ([address] 2016    Previous  delivery affecting pregnancy, antepartum 2017    DKA, type 1, not at goal [facility]) 2017    Elevated blood pressure reading without diagnosis of hypertension 2017    Elevated blood pressure affecting pregnancy in second trimester, antepartum 2017    19 weeks gestation of pregnancy 2017    27 weeks gestation of pregnancy 2017    Oligohydramnios     Kidney stones 2017    Menses painful 2017    Opioid abuse, episodic ([address] 2017    Tobacco use 2017    Dehydration 2018    Sepsis ([address] 04/15/2018    Hypoglycemia 2018    Type 1 diabetes mellitus with other specified complication ([address]     Dehydration 2018    Hyponatremia 2018    Other specified diabetes mellitus with other specified complication ([address]     First trimester pregnancy 2018    Acute kidney injury superimposed on CKD ([address] 2018    Diabetic gastroparesis associated with type 1 diabetes mellitus ([address] 2018    Diarrhea in adult patient 2018    Generalized abdominal pain 2018    Acute kidney injury superimposed on chronic kidney disease ([facility] 10/01/2019    Acute gastroenteritis 10/01/2019    High anion gap metabolic acidosis 73/90/8307    Headache 10/09/2019    Acute respiratory failure with hypoxia (HCC) 10/29/2019    Aspiration pneumonia of both lungs (HCC) 10/29/2019    Acute cholecystitis     Chest pain, unspecified 01/13/2020    Dehydration     Orthostatic hypotension     C. difficile colitis     Altered mental status 02/04/2020    Acute heart failure with preserved ejection fraction (HCC)     Hypervolemia     Chronic kidney disease     Elevated troponin     Cellulitis of right hand     Chronic renal impairment     Acute congestive heart failure (HCC)     Hypertensive encephalopathy     Intractable vomiting with nausea 07/16/2020    Intractable nausea and vomiting 07/17/2020    Hypoalbuminemia 07/24/2020    ESRD (end stage renal disease) ([address] 09/29/2020    Hyperglycemia     Acute encephalopathy     Effusion of left knee 10/14/2020    Hyperkalemia 10/16/2020    Acute bacterial endocarditis     Acute metabolic encephalopathy     Gram-positive cocci bacteremia     Urinary tract infection without hematuria     Chronic diarrhea 11/01/2020    Valvular endocarditis 11/10/2020    Hyperosmolar hyperglycemic state (HHS) ([facility] 11/20/2020    Hypothermia 11/24/2020    Diabetic polyneuropathy associated with type 1 diabetes mellitus ([address] 12/27/2020    Cardiac arrest ([address] 02/15/2021    Shock liver 02/15/2021    Bacterial pneumonia 02/19/2021    Ascites 12/07/2021     Past Medical History:   Diagnosis Date    BC (acute kidney injury) ([address] 10/01/2019    Cephalgia 10/09/2019    Depression     H/O cardiovascular stress test 08/27/2021    Hemodialysis patient [facility])     History of blood transfusion 11/2019    MDRO (multiple drug resistant organisms) resistance     MRSA (methicillin resistant Staphylococcus aureus)     Other disorders of kidney and ureter PAST SURGICAL Hx:   Past Surgical History:   Procedure Laterality Date    BACK SURGERY      abscess    CATHETER REMOVAL N/A 10/1/2021    PERITONEAL CATHETER REMOVAL performed by Christa Farias MD at [facility]      x2   [facility] [place] [facility] [place] LAPAROSCOPIC N/A 11/7/2019    CHOLECYSTECTOMY LAPAROSCOPIC performed by Purnima Gabriel MD at [address] [facility] N/A 6/25/2018    COLONOSCOPY WITH BIOPSY performed by Aimee Turner MD at [address] [facility] COLONOSCOPY N/A 12/18/2018    COLONOSCOPY WITH BIOPSY performed by Saeid Gerber MD at [address]  1/31/2012    EF 57%    ECHO COMPL W DOP COLOR FLOW  6/10/2013         EMBOLECTOMY N/A 11/6/2020    [address] [facility], [address] [facility], [place] -- REQS ROOM 3 performed by Xu Galarza MD at [address] [facility] Left 2/23/2021    LEFT LEG INCISION AND DRAINAGE, DEBRIDEMENT, WOUND VAC APPLICATION performed by Alejandra Burnett DPM at [address] [place] [address] Left 5/18/2021    LEFT LEG DEBRIDEMENT BIOPSY POSS APPLICATION WOUND VAC performed by Alejandra Burnett DPM at [place] [address] N/A 6/26/2020    LAPAROSCOPIC INSERTION PERITONEAL DIALYSIS CATHETER performed by Albert Merino MD at [place] [address] ESOPHAGOGASTRODUODENOSCOPY TRANSORAL DIAGNOSTIC N/A 5/30/2018    EGD ESOPHAGOGASTRODUODENOSCOPY performed by Aimee Turner MD at [address] [facility] TRANSESOPHAGEAL ECHOCARDIOGRAM N/A 10/19/2020    TRANSESOPHAGEAL ECHOCARDIOGRAM WITH BUBBLE STUDY performed by Mariola Dejesus MD at [address] [facility] TUNNELED VENOUS PORT PLACEMENT  04/2018    UPPER GASTROINTESTINAL ENDOSCOPY  12/18/2018    EGD BIOPSY performed by Saeid Gerber MD at [address] 10/11/2019    EGD ESOPHAGOGASTRODUODENOSCOPY performed by Sonya Pritchard DO at [address] 7/14/2021    EGD BIOPSY performed by [place] Abreu MD at [facility] ENDOSCOPY          FAMILY Hx:  Family History   Problem Relation Age of Onset   Eriberto Safe Asthma Mother     Hypertension Mother     High Blood Pressure Mother     Diabetes Mother     Asthma Brother     High Blood Pressure Father           HOME MEDICATIONS:  Prior to Admission medications    Medication Sig Start Date End Date Taking? Authorizing Provider   Glucagon 3 MG/DOSE POWD by Nasal route See Admin Instructions   Yes Historical Provider, MD   omeprazole (PRILOSEC) 20 MG delayed release capsule Take 20 mg by mouth nightly   Yes Historical Provider, MD   hydrOXYzine (ATARAX) 25 MG tablet Take 25 mg by mouth every 8 hours as needed for Itching   Yes Historical Provider, MD   epoetin nena-epbx (RETACRIT) 95558 UNIT/ML SOLN injection Inject 0.5 mLs into the skin three times a week 2/4/22  Yes Evin Lopez DO   pregabalin (LYRICA) 50 MG capsule Take 1 capsule by mouth 3 times daily for 30 days. Patient taking differently: Take 50 mg by mouth 2 times daily.   2/3/22 3/5/22 Yes Evin Lopez DO   sevelamer (RENVELA) 800 MG tablet Take 2 tablets by mouth 3 times daily (with meals)   Yes Historical Provider, MD   insulin lispro (HUMALOG) 100 UNIT/ML injection vial Inject 0-3 Units into the skin 4 times daily (before meals and nightly)  Patient taking differently: Inject 0-10 Units into the skin 3 times daily (before meals)  1/28/22  Yes Dante Remy MD   escitalopram (LEXAPRO) 5 MG tablet Take 1 tablet by mouth daily 1/28/22  Yes Dante Remy MD   dilTIAZem (CARDIZEM) 90 MG tablet Take 1 tablet by mouth 4 times daily Hold the morning of Hemodialysis  Hold For SBP <100, HR<55 1/28/22  Yes Dante Remy MD   insulin glargine (LANTUS) 100 UNIT/ML injection vial Inject 1 Units into the skin every morning 1/26/22  Yes Dante Remy MD   cholestyramine Connkaity Viramontes) 4 g packet Take 1 packet by mouth 2 times daily 1/24/22  Yes Dante Remy MD   promethazine (PHENERGAN) 25 MG tablet Take 25 mg by mouth every 6 hours as needed for Nausea   Yes Historical Provider, MD   ondansetron (ZOFRAN) 4 MG tablet Take 4 mg by mouth every 8 hours as needed for Nausea or Vomiting   Yes Historical Provider, MD   albuterol sulfate  (90 Base) MCG/ACT inhaler Inhale 4 puffs into the lungs as needed for Wheezing or Shortness of Breath 1/5/22  Yes Evin Lopez DO   docusate sodium (COLACE) 100 MG capsule Take 1 capsule by mouth daily  Patient taking differently: Take 100 mg by mouth daily as needed  12/13/21  Yes Cj Greco MD   sennosides-docusate sodium (SENOKOT-S) 8.6-50 MG tablet Take 2 tablets by mouth nightly as needed for Constipation 11/25/21  Yes Vitaly Douglass PA-C   vitamin D (ERGOCALCIFEROL) 1.25 MG (70419 UT) CAPS capsule Take 1 capsule by mouth once a week 9/3/21  Yes Kody Santos MD   mirtazapine (REMERON) 15 MG tablet Take 15 mg by mouth nightly   Yes Historical Provider, MD   polyethylene glycol (GLYCOLAX) 17 g packet Take 17 g by mouth daily as needed for Constipation    Yes Historical Provider, MD   ARIPiprazole (ABILIFY) 5 MG tablet Take 5 mg by mouth nightly 4/18/21  Yes Historical Provider, MD   levothyroxine (SYNTHROID) 75 MCG tablet TAKE 1 TABLET BY MOUTH IN THE MORNING BEFORE BREAKFAST 4/19/21  Yes Jacinda Littlejohn DO   rOPINIRole (REQUIP) 0.25 MG tablet Take 0.25 mg by mouth nightly   Yes Historical Provider, MD         ALLERGIES:  Allergies   Allergen Reactions    Cefepime Other (See Comments)     \" neurological side effect- slurred speech and confusion    Toradol [Ketorolac Tromethamine] Anaphylaxis and Hives          SOCIAL Hx:  Social History     Tobacco History     Smoking Status  Former Smoker Quit date  1/28/2021 Smoking Frequency  0.5 packs/day for 6 years (3 pk yrs) Smoking Tobacco Type  Cigarettes    Smokeless Tobacco Use  Never Used    Tobacco Comment  vaper cig          Alcohol History     Alcohol Use Status  No          Drug Use     Drug Use Status  Not Currently Types  Opiates  Comment  documented prior history of opioid abuse          Sexual Activity     Sexually Active  Yes Partners  Male                 PE:  BP (!) 140/77   Pulse 50   Temp 98.6 °F (37 °C) (Oral)   Resp 14   Ht 5' 2\" (1.575 m)   Wt 95 lb (43.1 kg)   SpO2 95%   BMI 17.38 kg/m²      General: A&Ox 3, friendly, NAD  HEENT: Atraumatic, symmetric, no anterior/posterior lymphadenopathy, moist mucous membranes, PERRL, EOM intact, fair dentition. Pulm: CTAB, Neg w/r/r, normal chest expansion, no crackles noted  Cardio: RRR, neg m/r/g, nl S1 and S2, no extra heart sounds  Abd.: soft, NT, ND, BS+, no G/R, no HSM  Ext: +2/4 pulse Dp and radial b/l, LE and UE ROM intact,   Skin: No lesions, excoriations, petechiae, or ecchymoses noted    Neuro: normal sensation throughout, DTRs patellar, tricept, and bicept 2/4 b/l and equal, normal muscle strength throughout. DATA:  Lab Results   Component Value Date/Time    INR 1.1 12/08/2021 08:17 AM               ASSESSMENT/PLAN:  1.   Diabetic Gastroparesis  -EGD w/pyloric dilation and Botox injection    Donna Craig MD  1:02 PM  [unfilled]

## 2022-03-02 NOTE — PROGRESS NOTES
Per Michele Craig wants pt admitted under the hospitalist. Odalys Gaona to Dr. Harriet Lobo, he will accept patient and admit under med/surg observation.

## 2022-03-02 NOTE — H&P
[address] [place], [place] [address]ist Group   History and Physical      CHIEF COMPLAINT:  Hyperglycemia    History of Present Illness:  36 y.o. female with a history of ESRD on hemodialysis, type I diabetes mellitus, generalized chronic pain, diabetic gastroparesis, past endocarditis and cardiac arrest presents with hyperglycemia prior to outpatient EGD. The patient was scheduled to have EGD with pyloric dilation and botox injection for diabetic gastroparesis, however her labs showed that her glucose was 588 prior to procedure so it was cancelled and medicine was called for admission. She states her only complaint currently is generalized stomach pain. She is unable to pinpoint any specific location. She states it is not any worse than her usual stomach pain. Denies N/V/D, fever/chills, CP, SOB or any other complaints. Informant(s) for H&P:Patient, EMR review    REVIEW OF SYSTEMS:  no fevers, chills, cp, sob, n/v, ha, vision/hearing changes, wt changes, hot/cold flashes, other open skin lesions, diarrhea, constipation, dysuria/hematuria unless noted in HPI. Complete ROS performed with the patient and is otherwise negative.       PMH:  Past Medical History:   Diagnosis Date    Acute congestive heart failure ([address]     BC (acute kidney injury) ([address] 10/01/2019    Cardiac arrest ([address] 02/15/2021    Cephalgia 10/09/2019    Chronic kidney disease     Depression     Diabetes mellitus ([address]     Diabetic gastroparesis associated with type 1 diabetes mellitus ([address] 12/17/2018    Diabetic ketoacidosis ([address] 08/27/2011    Diabetic ketoacidosis with coma associated with type 1 diabetes mellitus ([address] 06/26/2013    Diabetic polyneuropathy associated with type 1 diabetes mellitus ([address] 12/27/2020    Drug use complicating pregnancy in third trimester     Endocarditis 10/31/2020    ESRD (end stage renal disease) ([address] 09/29/2020    H/O cardiovascular stress test 08/27/2021    [facility])     History of blood transfusion 2019    Hyperlipidemia 10/08/2020    Hyperosmolar hyperglycemic state (HHS) ([facility] 2020    Hypothyroidism 10/08/2020    Iron deficiency anemia 10/01/2019    MDRO (multiple drug resistant organisms) resistance     MRSA (methicillin resistant Staphylococcus aureus)     back wound abcess    Non compliance w medication regimen 2016    Other disorders of kidney and ureter     Pregnancy 2016    16 weeks    Previous  delivery affecting pregnancy, antepartum 2017    Previous stillbirth or demise, antepartum 2016    Seizure ([facility] 2020    Severe pre-eclampsia in third trimester 2016    Shock liver 02/15/2021    Valvular endocarditis 11/10/2020    This Diagnosis was added to the Problem List based on transcribed orders from Dr. Laura Macias          Surgical History:  Past Surgical History:   Procedure Laterality Date    BACK SURGERY      abscess    CATHETER REMOVAL N/A 10/1/2021    PERITONEAL CATHETER REMOVAL performed by Denver Rucks, MD at [facility] 49      x2   [facility], LAPAROSCOPIC N/A 2019    CHOLECYSTECTOMY LAPAROSCOPIC performed by Luca Acevedo MD at [address] [place] [address] N/A 2018    COLONOSCOPY WITH BIOPSY performed by Malathi Francis MD at [facility] N/A 2018    COLONOSCOPY WITH BIOPSY performed by Jacob Phillips MD at [address] [facility]  2012    EF 57%    ECHO COMPL W DOP COLOR FLOW  6/10/2013         EMBOLECTOMY N/A 2020    ANGIOVAC, VEGECTOMY, YULISSA -- REQS ROOM 3 performed by Amina Shah MD at [address] [place] [address] Left 2021    LEFT LEG INCISION AND DRAINAGE, DEBRIDEMENT, WOUND VAC APPLICATION performed by Saúl Charles DPM at [address] [place] [address] Left 2021    LEFT LEG DEBRIDEMENT BIOPSY POSS APPLICATION WOUND VAC performed by Costa Carroll DPM at [address] [place] [address] LAPAROSCOPY INSERTION PERITONEAL CATHETER N/A 6/26/2020    LAPAROSCOPIC INSERTION PERITONEAL DIALYSIS CATHETER performed by Annamaria Arevalo MD at [facility] ESOPHAGOGASTRODUODENOSCOPY TRANSORAL DIAGNOSTIC N/A 5/30/2018    EGD ESOPHAGOGASTRODUODENOSCOPY performed by Evelyn Khan MD at [address] [facility] TRANSESOPHAGEAL ECHOCARDIOGRAM N/A 10/19/2020    TRANSESOPHAGEAL ECHOCARDIOGRAM WITH BUBBLE STUDY performed by Rosa Carr MD at [address] TUNNELED VENOUS PORT PLACEMENT  04/2018    UPPER GASTROINTESTINAL ENDOSCOPY  12/18/2018    EGD BIOPSY performed by Ekta Landry MD at [address] 10/11/2019    EGD ESOPHAGOGASTRODUODENOSCOPY performed by Sena Jarvis DO at [address] [place] [address] N/A 7/14/2021    EGD BIOPSY performed by Shannan Cuevas MD at [facility] 23       Medications Prior to Admission:    Prior to Admission medications    Medication Sig Start Date End Date Taking? Authorizing Provider   Glucagon 3 MG/DOSE POWD by Nasal route See Admin Instructions   Yes Historical Provider, MD   omeprazole (PRILOSEC) 20 MG delayed release capsule Take 20 mg by mouth nightly   Yes Historical Provider, MD   hydrOXYzine (ATARAX) 25 MG tablet Take 25 mg by mouth every 8 hours as needed for Itching   Yes Historical Provider, MD   epoetin nena-epbx (RETACRIT) 90096 UNIT/ML SOLN injection Inject 0.5 mLs into the skin three times a week 2/4/22  Yes Evin Lopez DO   pregabalin (LYRICA) 50 MG capsule Take 1 capsule by mouth 3 times daily for 30 days. Patient taking differently: Take 50 mg by mouth 2 times daily.   2/3/22 3/5/22 Yes Evin Lopez DO   sevelamer (RENVELA) 800 MG tablet Take 2 tablets by mouth 3 times daily (with meals)   Yes Historical Provider, MD   insulin lispro (HUMALOG) 100 UNIT/ML injection vial Inject 0-3 Units into the skin 4 times daily (before meals and nightly)  Patient taking differently: Inject 0-10 Units into the skin 3 times daily (before meals)  1/28/22  Yes Tracy Vega MD   escitalopram (LEXAPRO) 5 MG tablet Take 1 tablet by mouth daily 1/28/22  Yes Tracy Vega MD   dilTIAZem (CARDIZEM) 90 MG tablet Take 1 tablet by mouth 4 times daily Hold the morning of Hemodialysis  Hold For SBP <100, HR<55 1/28/22  Yes Tracy Vega MD   insulin glargine (LANTUS) 100 UNIT/ML injection vial Inject 1 Units into the skin every morning 1/26/22  Yes Tracy Vega MD   cholestyramine Curry Clore) 4 g packet Take 1 packet by mouth 2 times daily 1/24/22  Yes Tracy Vega MD   promethazine (PHENERGAN) 25 MG tablet Take 25 mg by mouth every 6 hours as needed for Nausea   Yes Historical Provider, MD   ondansetron (ZOFRAN) 4 MG tablet Take 4 mg by mouth every 8 hours as needed for Nausea or Vomiting   Yes Historical Provider, MD   albuterol sulfate  (90 Base) MCG/ACT inhaler Inhale 4 puffs into the lungs as needed for Wheezing or Shortness of Breath 1/5/22  Yes Evin Lopez DO   docusate sodium (COLACE) 100 MG capsule Take 1 capsule by mouth daily  Patient taking differently: Take 100 mg by mouth daily as needed  12/13/21  Yes No Yee MD   sennosides-docusate sodium (SENOKOT-S) 8.6-50 MG tablet Take 2 tablets by mouth nightly as needed for Constipation 11/25/21  Yes Virgil Guerrero PA-C   vitamin D (ERGOCALCIFEROL) 1.25 MG (46365 UT) CAPS capsule Take 1 capsule by mouth once a week 9/3/21  Yes Brittni Cantu MD   mirtazapine (REMERON) 15 MG tablet Take 15 mg by mouth nightly   Yes Historical Provider, MD   polyethylene glycol (GLYCOLAX) 17 g packet Take 17 g by mouth daily as needed for Constipation    Yes Historical Provider, MD   ARIPiprazole (ABILIFY) 5 MG tablet Take 5 mg by mouth nightly 4/18/21  Yes Historical Provider, MD   levothyroxine (SYNTHROID) 75 MCG tablet TAKE 1 TABLET BY MOUTH IN THE MORNING BEFORE BREAKFAST 4/19/21  Yes Jacinda Littlejohn, DO   rOPINIRole (REQUIP) 0.25 MG tablet Take 0.25 mg by mouth nightly   Yes Historical Provider, MD       Allergies:    Cefepime and Toradol [ketorolac tromethamine]    Social History:    reports that she quit smoking about 13 months ago. Her smoking use included cigarettes. She has a 3.00 pack-year smoking history. She has never used smokeless tobacco. She reports previous drug use. Drug: Opiates . She reports that she does not drink alcohol. Family History:   family history includes Asthma in her brother and mother; Diabetes in her mother; High Blood Pressure in her father and mother; Hypertension in her mother. PHYSICAL EXAM:  Vitals:  BP (!) 150/70   Pulse 95   Temp 96.8 °F (36 °C) (Infrared)   Resp 18   Ht 5' 4\" (1.626 m)   Wt 136 lb (61.7 kg)   SpO2 98%   BMI 23.34 kg/m²     General Appearance: alert and oriented to person, place and time and in no acute distress  Skin: warm and dry, hyperpigmented scar on LLE, dry skin  Head: normocephalic and atraumatic  Eyes: pupils equal, round, and reactive to light, extraocular eye movements intact, conjunctivae normal  Neck: neck supple and non tender without mass   Pulmonary/Chest: clear to auscultation bilaterally- no wheezes, rales or rhonchi, normal air movement, no respiratory distress  Cardiovascular: normal rate, normal S1 and S2 and no carotid bruits  Abdomen: soft, mildly tender throughout, non-distended, normal bowel sounds, no masses or organomegaly  Extremities: no cyanosis, no clubbing and edema present of b/l LE, more concentrated at b/l knees. Some tenderness of R knee upon palpation  Neurologic: no cranial nerve deficit and speech normal    LABS:  Recent Labs     03/02/22  1320      K 5.9*   CL 95*   CO2 24   BUN 20   CREATININE 3.2*   GLUCOSE 588*   CALCIUM 8.2*       No results for input(s): WBC, RBC, HGB, HCT, MCV, MCH, MCHC, RDW, PLT, MPV in the last 72 hours. No results for input(s): POCGLU in the last 72 hours.       Radiology: XR CHEST PORTABLE    Result Date: 2/1/2022  EXAMINATION: ONE XRAY VIEW OF THE CHEST 2/1/2022 9:31 am COMPARISON: 1/22/2022 HISTORY: ORDERING SYSTEM PROVIDED HISTORY: chest pain TECHNOLOGIST PROVIDED HISTORY: Reason for exam:->chest pain FINDINGS: The cardiac silhouette is mildly enlarged. Note is made of a right dialysis catheter. Hazy bilateral perihilar airspace disease is noted favored to represent volume overload. There are persistent patchy opacities within the right and left lung bases favored to represent pneumonia or atelectasis. There is no gross pleural effusion. 1. Hazy bilateral perihilar airspace disease favored to represent volume overload. 2. Patchy opacities within the lung bases which could represent atelectasis or pneumonia. EKG: None available    ASSESSMENT:      Principal Problem:    Hyperglycemia  Resolved Problems:    * No resolved hospital problems. *      PLAN:    1. Hyperglycemia  -Glucose 588 prior to EGD today  -Anion gap 14  -Will check BHB and pH   -Give 10 units humalog now and continue sliding scale insulin and ACHS glucose checks  -Start NS at 75ml/hr for 1L  -Continue home lantus (currently 1 unit QD) and adjust as necessary     2. Chronic generalized pain   -On lyrica at home, will continue  -Avoid opiates as patient has been seen by pain management previously and recommended against narcotics unless clear indication  -Patient requesting pain medication on initial interview, stated she does not like lyrica  -Will consult pain management again for consideration of different nonopiate pain regimen without lyrica     3. End stage renal disease on hemodialysis   -nephrology consulted for HD management   -Continue sevelamer     4. Type I diabetes mellitus  -Currently glucose 588, AG 14  -Will check BHB and pH to ensure not in early DKA  -continue basal insulin with low dose corrective scale  -hypoglycemia orders in place      5.  Sinus tachycardia  -continue diltiazem-hold mornings of dialysis   -HR 95 on admission     6. Anemia of chronic kidney disease  -continue Retacrit  -monitor hemoglobin      7. Depression  -continue Lexapro and Abilify     8. Diabetic gastroparesis  -Was supposed to have outpatient EGD for pyloric dilation and botox injection today but cancelled for hyperglycemia    9. R knee pain  -Patient reported she fell on her right leg a couple days ago and her knee has been hurting since. Denies hitting her head or LOC  -R knee is TTP, will check xray      Code Status: Full code  DVT prophylaxis: Heparin    NOTE: This report was transcribed using voice recognition software.  Every effort was made to ensure accuracy; however, inadvertent computerized transcription errors may be present.     Electronically signed by Sonia Reid MD on 3/2/2022 at 3:42 PM

## 2022-03-02 NOTE — LETTER
[address] Medicaid  CERTIFICATION OF NECESSITY  FOR TRANSPORTATION   BY WHEELCHAIR VAN     Individual Information   1. Name: Manjinder Lewis 2. [place] Medicaid Billing Number:[id number]   3. Address: [address] [place] [address]      Transportation Provider Information   4. Provider Name: [facility]   5. [place] Medicaid Provider Number:  National Provider Identifier (NPI):        Certification  7. Criteria:  By signing this document, the practitioner certifies that two statements are true:  A. This individual must be accompanied by a mobility-related assistive device from the point of pick-up to the point of drop-off. B. Transport of this individual by standard passenger vehicle or common carrier is precluded or contraindicated. 8. Period Beginning Date: 3/4/2022     9. Length  [x] Not more than 1 day(s)  [] One Year     Additional Information Relevant to Certification   10. Comments or Explanations, If Necessary or Appropriate   Contact isolation for C-diff, weakness, falls precautions, dialysis patient, brittle diabetes, minimal assist of 1 with assistive device for mobility/transfers     Certifying Practitioner Information   11. Name of Practitioner: Dr. Bonnie Roper   12. [place] Medicaid Provider Number, If Applicable:  [address] Provider Identifier (NPI):      Signature Information   14. Date of Signature: 3/4/2022   15. Name of Person Signing: Ivet Olvera RN   16.  Signature and Professional Designation: Electronically signed by Ivet Olvera RN on 3/4/2022 at 3:46 PM     ODM [id number]  Rev. 7/2015

## 2022-03-02 NOTE — ANESTHESIA PRE PROCEDURE
Department of Anesthesiology  Preprocedure Note       Name:  Ousmane Cadena   Age:  36 y.o.  :  1990                                          MRN:  [id number]         Date:  3/2/2022      Surgeon: Segundo Reid):  Krista Jordan MD    Procedure: Procedure(s):  EGD ESOPHAGOGASTRODUODENOSCOPY WITH BOTOX    Medications prior to admission:   Prior to Admission medications    Medication Sig Start Date End Date Taking? Authorizing Provider   Glucagon 3 MG/DOSE POWD by Nasal route See Admin Instructions   Yes Historical Provider, MD   omeprazole (PRILOSEC) 20 MG delayed release capsule Take 20 mg by mouth nightly   Yes Historical Provider, MD   hydrOXYzine (ATARAX) 25 MG tablet Take 25 mg by mouth every 8 hours as needed for Itching   Yes Historical Provider, MD   epoetin nena-epbx (RETACRIT) 25960 UNIT/ML SOLN injection Inject 0.5 mLs into the skin three times a week 22  Yes Evin Lopez DO   pregabalin (LYRICA) 50 MG capsule Take 1 capsule by mouth 3 times daily for 30 days. Patient taking differently: Take 50 mg by mouth 2 times daily.   2/3/22 3/5/22 Yes Evin Lopez DO   sevelamer (RENVELA) 800 MG tablet Take 2 tablets by mouth 3 times daily (with meals)   Yes Historical Provider, MD   insulin lispro (HUMALOG) 100 UNIT/ML injection vial Inject 0-3 Units into the skin 4 times daily (before meals and nightly)  Patient taking differently: Inject 0-10 Units into the skin 3 times daily (before meals)  22  Yes Tyrone Serrato MD   escitalopram (LEXAPRO) 5 MG tablet Take 1 tablet by mouth daily 22  Yes Tyrone Serrato MD   dilTIAZem (CARDIZEM) 90 MG tablet Take 1 tablet by mouth 4 times daily Hold the morning of Hemodialysis  Hold For SBP <100, HR<55 22  Yes Tyrone Serrato MD   insulin glargine (LANTUS) 100 UNIT/ML injection vial Inject 1 Units into the skin every morning 22  Yes Tyrone Serrato MD   cholestyramine [place] Lob) 4 g packet Take 1 packet by mouth 2 times daily 1/24/22  Yes Kim Elizabeth MD   promethazine (PHENERGAN) 25 MG tablet Take 25 mg by mouth every 6 hours as needed for Nausea   Yes Historical Provider, MD   ondansetron (ZOFRAN) 4 MG tablet Take 4 mg by mouth every 8 hours as needed for Nausea or Vomiting   Yes Historical Provider, MD   albuterol sulfate  (90 Base) MCG/ACT inhaler Inhale 4 puffs into the lungs as needed for Wheezing or Shortness of Breath 1/5/22  Yes Evin Lopez DO   docusate sodium (COLACE) 100 MG capsule Take 1 capsule by mouth daily  Patient taking differently: Take 100 mg by mouth daily as needed  12/13/21  Yes Clara Jeffries MD   sennosides-docusate sodium (SENOKOT-S) 8.6-50 MG tablet Take 2 tablets by mouth nightly as needed for Constipation 11/25/21  Yes Gaudencio Galan PA-C   vitamin D (ERGOCALCIFEROL) 1.25 MG (13405 UT) CAPS capsule Take 1 capsule by mouth once a week 9/3/21  Yes Yakov Pierson MD   mirtazapine (REMERON) 15 MG tablet Take 15 mg by mouth nightly   Yes Historical Provider, MD   polyethylene glycol (GLYCOLAX) 17 g packet Take 17 g by mouth daily as needed for Constipation    Yes Historical Provider, MD   ARIPiprazole (ABILIFY) 5 MG tablet Take 5 mg by mouth nightly 4/18/21  Yes Historical Provider, MD   levothyroxine (SYNTHROID) 75 MCG tablet TAKE 1 TABLET BY MOUTH IN THE MORNING BEFORE BREAKFAST 4/19/21  Yes Jacinda Littlejohn DO   rOPINIRole (REQUIP) 0.25 MG tablet Take 0.25 mg by mouth nightly   Yes Historical Provider, MD       Current medications:    No current facility-administered medications for this encounter.      Current Outpatient Medications   Medication Sig Dispense Refill    Glucagon 3 MG/DOSE POWD by Nasal route See Admin Instructions      omeprazole (PRILOSEC) 20 MG delayed release capsule Take 20 mg by mouth nightly      hydrOXYzine (ATARAX) 25 MG tablet Take 25 mg by mouth every 8 hours as needed for Itching      epoetin nena-epbx (RETACRIT) 53937 UNIT/ML SOLN injection Inject 0.5 mLs into the skin three times a week 6.6 mL     pregabalin (LYRICA) 50 MG capsule Take 1 capsule by mouth 3 times daily for 30 days.  (Patient taking differently: Take 50 mg by mouth 2 times daily. ) 90 capsule 0    sevelamer (RENVELA) 800 MG tablet Take 2 tablets by mouth 3 times daily (with meals)      insulin lispro (HUMALOG) 100 UNIT/ML injection vial Inject 0-3 Units into the skin 4 times daily (before meals and nightly) (Patient taking differently: Inject 0-10 Units into the skin 3 times daily (before meals) ) 10 mL 3    escitalopram (LEXAPRO) 5 MG tablet Take 1 tablet by mouth daily 30 tablet 3    dilTIAZem (CARDIZEM) 90 MG tablet Take 1 tablet by mouth 4 times daily Hold the morning of Hemodialysis  Hold For SBP <100, HR<55 120 tablet 3    insulin glargine (LANTUS) 100 UNIT/ML injection vial Inject 1 Units into the skin every morning 10 mL 3    cholestyramine (QUESTRAN) 4 g packet Take 1 packet by mouth 2 times daily 90 packet 0    promethazine (PHENERGAN) 25 MG tablet Take 25 mg by mouth every 6 hours as needed for Nausea      ondansetron (ZOFRAN) 4 MG tablet Take 4 mg by mouth every 8 hours as needed for Nausea or Vomiting      albuterol sulfate  (90 Base) MCG/ACT inhaler Inhale 4 puffs into the lungs as needed for Wheezing or Shortness of Breath 18 g 0    docusate sodium (COLACE) 100 MG capsule Take 1 capsule by mouth daily (Patient taking differently: Take 100 mg by mouth daily as needed ) 30 capsule 0    sennosides-docusate sodium (SENOKOT-S) 8.6-50 MG tablet Take 2 tablets by mouth nightly as needed for Constipation      vitamin D (ERGOCALCIFEROL) 1.25 MG (92014 UT) CAPS capsule Take 1 capsule by mouth once a week 5 capsule 0    mirtazapine (REMERON) 15 MG tablet Take 15 mg by mouth nightly      polyethylene glycol (GLYCOLAX) 17 g packet Take 17 g by mouth daily as needed for Constipation       ARIPiprazole (ABILIFY) 5 MG tablet Take 5 mg by mouth nightly      levothyroxine (SYNTHROID) 75 MCG tablet TAKE 1 TABLET BY MOUTH IN THE MORNING BEFORE BREAKFAST 60 tablet 0    rOPINIRole (REQUIP) 0.25 MG tablet Take 0.25 mg by mouth nightly         Allergies:     Allergies   Allergen Reactions    Cefepime Other (See Comments)     \" neurological side effect- slurred speech and confusion    Toradol [Ketorolac Tromethamine] Anaphylaxis and Hives       Problem List:    Patient Active Problem List   Diagnosis Code    Non compliance w medication regimen Z91.14    Tobacco smoking complicating pregnancy J78.654    MTHFR mutation Z15.89    Generalized abdominal pain R10.84    Diabetic ketoacidosis without coma associated with type 1 diabetes mellitus ([facility] E10.10    Hypertension I10    Prolonged QT interval R94.31    Iron deficiency anemia D50.9    Sinus tachycardia R00.0    Bladder dysfunction N31.9    Hypokalemia E87.6    Severe protein-calorie malnutrition (HCC) E43    Uncontrolled type 1 diabetes mellitus with hyperglycemia ([facility]) E10.65    End stage renal disease ([facility]) N18.6    Hypothyroidism E03.9    Hyperlipidemia E78.5    Acute cystitis N30.00    Nondisplaced fracture of neck of fifth metacarpal bone, left hand, initial encounter for closed fracture S62.367A    Chronic right-sided low back pain M54.50, G89.29    Endocarditis I38    Seizure ([facility]) R56.9    Hyperkalemia E87.5    Hypomagnesemia E83.42    Hypocalcemia E83.51    Intractable nausea and vomiting R11.2    Wound of left leg, sequela S81.802S    Syncope R55    Type 1 diabetes mellitus without complication ([facility]) Y36.0    Hyperosmolality E87.0    Major depressive disorder F32.9    Lactic acid acidosis E87.2    Early satiety R68.81    Acute on chronic systolic CHF (congestive heart failure) ([facility]) I50.23    Other chest pain R07.89    Chronic renal failure syndrome N18.9    Septic shock ([facility]) A41.9, R65.21    ESRD (end stage renal disease) ([facility]) N18.6    Hyperosmolar hyperglycemic state (HHS) ([address] E11.00, E11.65    Hypertensive urgency I16.0    Nausea R11.0    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    ESRD (end stage renal disease) on dialysis ([address] N18.6, Z99.2    AMS (altered mental status) R41.82    Opioid overdose ([address] T40.2X1A    Anemia D64.9    Type 1 diabetes mellitus with chronic kidney disease on chronic dialysis (HCC) E10.22, N18.6, Z99.2    Elevated TSH R79.89    Diabetic acidosis without coma ([address] E11.10    DKA, type 1, not at goal ([address] E10.10    COVID-19 virus infection U07.1    Frequent hospital admissions Z78.9    Diabetic gastroparesis ([address] E11.43, K31.84    Diffuse pain R52    Poor prognosis Z78.9    Physical deconditioning R53.81    Declining functional status R53.81    Lack of motivation Z91.89    Drug-seeking behavior Z76.5    Behavior problem, adult F69    Volume overload E87.70       Past Medical History:        Diagnosis Date    Acute congestive heart failure (HCC)     BC (acute kidney injury) ([address] 10/01/2019    Cardiac arrest ([address] 02/15/2021    Cephalgia 10/09/2019    Chronic kidney disease     Depression     Diabetes mellitus ([address]     Diabetic gastroparesis associated with type 1 diabetes mellitus ([address] 12/17/2018    Diabetic ketoacidosis ([address] 08/27/2011    Diabetic ketoacidosis with coma associated with type 1 diabetes mellitus ([address] 06/26/2013    Diabetic polyneuropathy associated with type 1 diabetes mellitus ([address] 12/27/2020    Drug use complicating pregnancy in third trimester     Endocarditis 10/31/2020    ESRD (end stage renal disease) ([address] 09/29/2020    H/O cardiovascular stress test 08/27/2021    Lexiscan    Hemodialysis patient [facility])     History of blood transfusion 11/2019    Hyperlipidemia 10/08/2020    Hyperosmolar hyperglycemic state (HHS) ([address] 11/20/2020    Hypothyroidism 10/08/2020    Iron deficiency anemia 10/01/2019    MDRO (multiple drug resistant organisms) resistance     MRSA (methicillin resistant Staphylococcus aureus)     back wound abcess    Non compliance w medication regimen 2016    Other disorders of kidney and ureter     Pregnancy 2016    16 weeks    Previous  delivery affecting pregnancy, antepartum 2017    Previous stillbirth or demise, antepartum 2016    Seizure ([address] 2020    Severe pre-eclampsia in third trimester 2016    Shock liver 02/15/2021    Valvular endocarditis 11/10/2020    This Diagnosis was added to the Problem List based on transcribed orders from Dr. Laura Macias          Past Surgical History:        Procedure Laterality Date    BACK SURGERY      abscess    CATHETER REMOVAL N/A 10/1/2021    PERITONEAL CATHETER REMOVAL performed by Denver Rucks, MD at [facility]      x2   [facility] LAP[facility] N/A 2019    CHOLECYSTECTOMY LAPAROSCOPIC performed by Lyndon Nelson MD at [facility] N/A 2018    COLONOSCOPY WITH BIOPSY performed by Malathi Francis MD at [facility] N/A 2018    COLONOSCOPY WITH BIOPSY performed by Jacob Phillips MD at [address] [place] [address] ECHO COMPL W [address]  2012    EF 57%    ECHO COMPL W DOP COLOR FLOW  6/10/2013         EMBOLECTOMY N/A 2020    [facility], [id number] [place] [address], YLUISSA -- REQS ROOM 3 performed by Amina Shah MD at [facility] Left 2021    LEFT LEG INCISION AND DRAINAGE, DEBRIDEMENT, WOUND VAC APPLICATION performed by Saúl Charles DPM at [address] Left 2021    LEFT LEG DEBRIDEMENT BIOPSY POSS APPLICATION WOUND VAC performed by Saúl Charles DPM at [place] [address] N/A 2020    LAPAROSCOPIC INSERTION PERITONEAL DIALYSIS CATHETER performed by Sweetie Pérez MD at [facility] ESOPHAGOGASTRODUODENOSCOPY TRANSORAL DIAGNOSTIC N/A 2018    EGD ESOPHAGOGASTRODUODENOSCOPY performed by Malathi Francis MD at [address] [place] [address] TRANSESOPHAGEAL ECHOCARDIOGRAM N/A 10/19/2020    TRANSESOPHAGEAL ECHOCARDIOGRAM WITH BUBBLE STUDY performed by Jasmyne Guillen MD at [address] [facility] TUNNELED VENOUS PORT PLACEMENT  2018    UPPER GASTROINTESTINAL ENDOSCOPY  2018    EGD BIOPSY performed by Magdi Clay MD at [facility] N/A 10/11/2019    EGD ESOPHAGOGASTRODUODENOSCOPY performed by Aida James DO at [facility] N/A 2021    EGD BIOPSY performed by Alie Booth MD at [facility] History:    Social History     Tobacco Use    Smoking status: Former Smoker     Packs/day: 0.50     Years: 6.00     Pack years: 3.00     Types: Cigarettes     Quit date: 2021     Years since quittin.0    Smokeless tobacco: Never Used    Tobacco comment: vaper cig   Substance Use Topics    Alcohol use: No                                Counseling given: Not Answered  Comment: vaper cig      Vital Signs (Current):   Vitals:    22 0938 22 1001   Weight:  136 lb 4 oz (61.8 kg)   Height: 5' 4\" (1.626 m) 5' 4\" (1.626 m)                                              BP Readings from Last 3 Encounters:   22 110/70   22 (!) 142/107   22 (!) 151/86       NPO Status:                                                                                 BMI:   Wt Readings from Last 3 Encounters:   22 141 lb 12.1 oz (64.3 kg)   22 141 lb 1.5 oz (64 kg)   22 143 lb 11.8 oz (65.2 kg)     Body mass index is 23.39 kg/m².     CBC:   Lab Results   Component Value Date    WBC 4.0 2022    RBC 2.99 2022    HGB 7.9 2022    HCT 25.0 2022    MCV 83.6 2022    RDW 22.9 2022     2022       CMP:   Lab Results   Component Value Date     2022    K 4.4 2022    K 5.1 2022     2022    CO2 26 2022    BUN 16 2022    CREATININE 3.0 2022 GFRAA 22 02/04/2022    LABGLOM 22 02/04/2022    GLUCOSE 175 02/04/2022    GLUCOSE 130 05/18/2012    PROT 6.2 02/01/2022    CALCIUM 7.9 02/04/2022    BILITOT 0.4 02/01/2022    ALKPHOS 241 02/01/2022    AST 15 02/01/2022    ALT 11 02/01/2022       POC Tests: No results for input(s): POCGLU, POCNA, POCK, POCCL, POCBUN, POCHEMO, POCHCT in the last 72 hours. Coags:   Lab Results   Component Value Date    PROTIME 12.0 12/08/2021    PROTIME 13.6 08/14/2011    INR 1.1 12/08/2021    APTT 29.3 09/28/2021       HCG (If Applicable):   Lab Results   Component Value Date    PREGTESTUR NEGATIVE 11/03/2020    HCG 53235.4 (H) 06/26/2013        ABGs:   Lab Results   Component Value Date    PHART 7.345 07/20/2012    PO2ART 91.5 10/29/2019    MLB7BQD 35.0 10/29/2019    MQD0YKT 14.0 02/15/2021    N2PBHJRD 97.7 09/08/2012        Type & Screen (If Applicable):  Lab Results   Component Value Date    LABABO O 12/29/2011    [address] POSITIVE 12/29/2011       Drug/Infectious Status (If Applicable):  No results found for: HIV, HEPCAB    COVID-19 Screening (If Applicable):   Lab Results   Component Value Date    COVID19 Not Detected 02/02/2022    COVID19 Not Detected 09/28/2021           Anesthesia Evaluation  Patient summary reviewed  Airway: Mallampati: Unable to assess / NA        Dental:          Pulmonary: breath sounds clear to auscultation  (+) COPD:                             Cardiovascular:    (+) hypertension:, valvular problems/murmurs (valvular endocarditis):, CHF (acute congestive heart failure):, hyperlipidemia      ECG reviewed  Rhythm: regular  Rate: normal  Echocardiogram reviewed               ROS comment: Cardiac arrest 2/15/2021    EKG: Sinus tachycardia 106. Nonspecific T wave abnormality  Abnormal ECG  When compared with ECG of 01-FEB-2022 08:48,  Nonspecific T wave abnormality now evident in Lateral leads  Confirmed by Brooklyn Killian ([id number]) on 2/3/2022 4:15:24 PM    ECHO:   Limited Echo for LV function.    Normal left ventricular chamber size and systolic function, LVEF is 60-65%. Normal right ventricle size and function. Normal aortic root size. No evidence of pericardial effusion. No intra cardiac mass or thrombus. Compared to prior echo from 2/22/21, no significant changes noted. Neuro/Psych:   (+) seizures:, neuromuscular disease (diabetic polyneuropathy):, headaches:, depression/anxiety    (-) psychiatric history           GI/Hepatic/Renal:   (+) liver disease (shock liver):, renal disease: ESRD and dialysis,           Endo/Other:    (+) Diabetes (gastroparesis, diabetic ketoacidosis coma)Type I DM, poorly controlled, using insulin, hypothyroidism, blood dyscrasia (iron deficient): anemia:., .                 Abdominal:             Vascular: Other Findings:           Anesthesia Plan      MAC     ASA 4       Induction: intravenous. BIS and continuous noninvasive hemodynamic monitor  MIPS: Postoperative opioids intended. Anesthetic plan and risks discussed with patient. Plan discussed with CRNA.     Attending anesthesiologist reviewed and agrees with Sebas Almaguer MD   3/2/2022

## 2022-03-03 ENCOUNTER — APPOINTMENT (OUTPATIENT)
Dept: GENERAL RADIOLOGY | Age: 32
End: 2022-03-03
Attending: INTERNAL MEDICINE
Payer: COMMERCIAL

## 2022-03-03 PROBLEM — N18.6 ANEMIA DUE TO CHRONIC KIDNEY DISEASE, ON CHRONIC DIALYSIS (HCC): Status: ACTIVE | Noted: 2021-09-13

## 2022-03-03 PROBLEM — D63.1 ANEMIA DUE TO CHRONIC KIDNEY DISEASE, ON CHRONIC DIALYSIS (HCC): Status: ACTIVE | Noted: 2021-09-13

## 2022-03-03 PROBLEM — Z99.2 ANEMIA DUE TO CHRONIC KIDNEY DISEASE, ON CHRONIC DIALYSIS (HCC): Status: ACTIVE | Noted: 2021-09-13

## 2022-03-03 LAB
ABO/RH: NORMAL
ANION GAP SERPL CALCULATED.3IONS-SCNC: 4 MMOL/L (ref 7–16)
ANION GAP SERPL CALCULATED.3IONS-SCNC: 8 MMOL/L (ref 7–16)
ANION GAP SERPL CALCULATED.3IONS-SCNC: 8 MMOL/L (ref 7–16)
ANION GAP SERPL CALCULATED.3IONS-SCNC: 9 MMOL/L (ref 7–16)
ANTIBODY SCREEN: NORMAL
BASOPHILS ABSOLUTE: 0.07 E9/L (ref 0–0.2)
BASOPHILS RELATIVE PERCENT: 1.6 % (ref 0–2)
BLOOD BANK DISPENSE STATUS: NORMAL
BLOOD BANK PRODUCT CODE: NORMAL
BPU ID: NORMAL
BUN BLDV-MCNC: 10 MG/DL (ref 6–20)
BUN BLDV-MCNC: 11 MG/DL (ref 6–20)
BUN BLDV-MCNC: 4 MG/DL (ref 6–20)
BUN BLDV-MCNC: 7 MG/DL (ref 6–20)
C DIFF TOXIN/ANTIGEN: NORMAL
C. DIFFICILE TOXIN MOLECULAR: ABNORMAL
CALCIUM SERPL-MCNC: 7.8 MG/DL (ref 8.6–10.2)
CALCIUM SERPL-MCNC: 7.9 MG/DL (ref 8.6–10.2)
CALCIUM SERPL-MCNC: 7.9 MG/DL (ref 8.6–10.2)
CALCIUM SERPL-MCNC: 8.5 MG/DL (ref 8.6–10.2)
CHLORIDE BLD-SCNC: 102 MMOL/L (ref 98–107)
CHLORIDE BLD-SCNC: 103 MMOL/L (ref 98–107)
CHLORIDE BLD-SCNC: 105 MMOL/L (ref 98–107)
CHLORIDE BLD-SCNC: 109 MMOL/L (ref 98–107)
CO2: 25 MMOL/L (ref 22–29)
CO2: 26 MMOL/L (ref 22–29)
CO2: 27 MMOL/L (ref 22–29)
CO2: 29 MMOL/L (ref 22–29)
CREAT SERPL-MCNC: 1.2 MG/DL (ref 0.5–1)
CREAT SERPL-MCNC: 1.4 MG/DL (ref 0.5–1)
CREAT SERPL-MCNC: 2.1 MG/DL (ref 0.5–1)
CREAT SERPL-MCNC: 2.4 MG/DL (ref 0.5–1)
DESCRIPTION BLOOD BANK: NORMAL
EOSINOPHILS ABSOLUTE: 0.14 E9/L (ref 0.05–0.5)
EOSINOPHILS RELATIVE PERCENT: 3.2 % (ref 0–6)
GFR AFRICAN AMERICAN: 29
GFR AFRICAN AMERICAN: 34
GFR AFRICAN AMERICAN: 54
GFR AFRICAN AMERICAN: >60
GFR NON-AFRICAN AMERICAN: 29 ML/MIN/1.73
GFR NON-AFRICAN AMERICAN: 34 ML/MIN/1.73
GFR NON-AFRICAN AMERICAN: 54 ML/MIN/1.73
GFR NON-AFRICAN AMERICAN: >60 ML/MIN/1.73
GLUCOSE BLD-MCNC: 112 MG/DL (ref 74–99)
GLUCOSE BLD-MCNC: 140 MG/DL (ref 74–99)
GLUCOSE BLD-MCNC: 156 MG/DL (ref 74–99)
GLUCOSE BLD-MCNC: 244 MG/DL (ref 74–99)
HCT VFR BLD CALC: 22.2 % (ref 34–48)
HCT VFR BLD CALC: 28.7 % (ref 34–48)
HEMOGLOBIN: 6.9 G/DL (ref 11.5–15.5)
HEMOGLOBIN: 9.1 G/DL (ref 11.5–15.5)
IMMATURE GRANULOCYTES #: 0.01 E9/L
IMMATURE GRANULOCYTES %: 0.2 % (ref 0–5)
LYMPHOCYTES ABSOLUTE: 1.07 E9/L (ref 1.5–4)
LYMPHOCYTES RELATIVE PERCENT: 24.6 % (ref 20–42)
MAGNESIUM: 1.8 MG/DL (ref 1.6–2.6)
MCH RBC QN AUTO: 27.1 PG (ref 26–35)
MCHC RBC AUTO-ENTMCNC: 31.1 % (ref 32–34.5)
MCV RBC AUTO: 87.1 FL (ref 80–99.9)
METER GLUCOSE: 122 MG/DL (ref 74–99)
METER GLUCOSE: 178 MG/DL (ref 74–99)
METER GLUCOSE: 267 MG/DL (ref 74–99)
METER GLUCOSE: 52 MG/DL (ref 74–99)
METER GLUCOSE: 66 MG/DL (ref 74–99)
METER GLUCOSE: 72 MG/DL (ref 74–99)
MONOCYTES ABSOLUTE: 0.27 E9/L (ref 0.1–0.95)
MONOCYTES RELATIVE PERCENT: 6.2 % (ref 2–12)
NEUTROPHILS ABSOLUTE: 2.79 E9/L (ref 1.8–7.3)
NEUTROPHILS RELATIVE PERCENT: 64.2 % (ref 43–80)
ORGANISM: ABNORMAL
PDW BLD-RTO: 17.4 FL (ref 11.5–15)
PLATELET # BLD: 229 E9/L (ref 130–450)
PMV BLD AUTO: 11.1 FL (ref 7–12)
POTASSIUM REFLEX MAGNESIUM: 3.5 MMOL/L (ref 3.5–5)
POTASSIUM REFLEX MAGNESIUM: 3.9 MMOL/L (ref 3.5–5)
POTASSIUM REFLEX MAGNESIUM: 4.1 MMOL/L (ref 3.5–5)
POTASSIUM REFLEX MAGNESIUM: 4.4 MMOL/L (ref 3.5–5)
RBC # BLD: 2.55 E12/L (ref 3.5–5.5)
SODIUM BLD-SCNC: 136 MMOL/L (ref 132–146)
SODIUM BLD-SCNC: 138 MMOL/L (ref 132–146)
SODIUM BLD-SCNC: 139 MMOL/L (ref 132–146)
SODIUM BLD-SCNC: 142 MMOL/L (ref 132–146)
WBC # BLD: 4.4 E9/L (ref 4.5–11.5)

## 2022-03-03 PROCEDURE — 73562 X-RAY EXAM OF KNEE 3: CPT

## 2022-03-03 PROCEDURE — 82962 GLUCOSE BLOOD TEST: CPT

## 2022-03-03 PROCEDURE — 80048 BASIC METABOLIC PNL TOTAL CA: CPT

## 2022-03-03 PROCEDURE — G0378 HOSPITAL OBSERVATION PER HR: HCPCS

## 2022-03-03 PROCEDURE — 85025 COMPLETE CBC W/AUTO DIFF WBC: CPT

## 2022-03-03 PROCEDURE — 86850 RBC ANTIBODY SCREEN: CPT

## 2022-03-03 PROCEDURE — 86900 BLOOD TYPING SEROLOGIC ABO: CPT

## 2022-03-03 PROCEDURE — 6370000000 HC RX 637 (ALT 250 FOR IP): Performed by: STUDENT IN AN ORGANIZED HEALTH CARE EDUCATION/TRAINING PROGRAM

## 2022-03-03 PROCEDURE — 90935 HEMODIALYSIS ONE EVALUATION: CPT

## 2022-03-03 PROCEDURE — 99232 SBSQ HOSP IP/OBS MODERATE 35: CPT | Performed by: INTERNAL MEDICINE

## 2022-03-03 PROCEDURE — 2580000003 HC RX 258: Performed by: STUDENT IN AN ORGANIZED HEALTH CARE EDUCATION/TRAINING PROGRAM

## 2022-03-03 PROCEDURE — 99253 IP/OBS CNSLTJ NEW/EST LOW 45: CPT | Performed by: PHYSICIAN ASSISTANT

## 2022-03-03 PROCEDURE — P9016 RBC LEUKOCYTES REDUCED: HCPCS

## 2022-03-03 PROCEDURE — 85018 HEMOGLOBIN: CPT

## 2022-03-03 PROCEDURE — 83735 ASSAY OF MAGNESIUM: CPT

## 2022-03-03 PROCEDURE — APPSS60 APP SPLIT SHARED TIME 46-60 MINUTES: Performed by: PHYSICIAN ASSISTANT

## 2022-03-03 PROCEDURE — 85014 HEMATOCRIT: CPT

## 2022-03-03 PROCEDURE — 6370000000 HC RX 637 (ALT 250 FOR IP): Performed by: PHYSICIAN ASSISTANT

## 2022-03-03 PROCEDURE — 86901 BLOOD TYPING SEROLOGIC RH(D): CPT

## 2022-03-03 PROCEDURE — 86923 COMPATIBILITY TEST ELECTRIC: CPT

## 2022-03-03 PROCEDURE — 36415 COLL VENOUS BLD VENIPUNCTURE: CPT

## 2022-03-03 PROCEDURE — 36430 TRANSFUSION BLD/BLD COMPNT: CPT

## 2022-03-03 RX ORDER — SODIUM CHLORIDE 9 MG/ML
INJECTION, SOLUTION INTRAVENOUS PRN
Status: DISCONTINUED | OUTPATIENT
Start: 2022-03-03 | End: 2022-03-04 | Stop reason: HOSPADM

## 2022-03-03 RX ORDER — DIPHENHYDRAMINE HCL 25 MG
25 TABLET ORAL SEE ADMIN INSTRUCTIONS
Status: DISCONTINUED | OUTPATIENT
Start: 2022-03-03 | End: 2022-03-04 | Stop reason: HOSPADM

## 2022-03-03 RX ORDER — ACETAMINOPHEN 325 MG/1
650 TABLET ORAL SEE ADMIN INSTRUCTIONS
Status: COMPLETED | OUTPATIENT
Start: 2022-03-03 | End: 2022-03-03

## 2022-03-03 RX ORDER — VANCOMYCIN HYDROCHLORIDE 125 MG/1
125 CAPSULE ORAL 4 TIMES DAILY
Qty: 40 CAPSULE | Refills: 0 | Status: SHIPPED | OUTPATIENT
Start: 2022-03-03 | End: 2022-03-13

## 2022-03-03 RX ORDER — GABAPENTIN 300 MG/1
300 CAPSULE ORAL ONCE
Status: COMPLETED | OUTPATIENT
Start: 2022-03-03 | End: 2022-03-03

## 2022-03-03 RX ADMIN — PREGABALIN 50 MG: 50 CAPSULE ORAL at 09:48

## 2022-03-03 RX ADMIN — Medication 25 ML: at 21:17

## 2022-03-03 RX ADMIN — Medication 125 MG: at 16:35

## 2022-03-03 RX ADMIN — INSULIN GLARGINE 1 UNITS: 100 INJECTION, SOLUTION SUBCUTANEOUS at 09:49

## 2022-03-03 RX ADMIN — DILTIAZEM HYDROCHLORIDE 90 MG: 30 TABLET, FILM COATED ORAL at 18:06

## 2022-03-03 RX ADMIN — HYDROXYZINE HYDROCHLORIDE 25 MG: 25 TABLET ORAL at 14:06

## 2022-03-03 RX ADMIN — MIRTAZAPINE 15 MG: 15 TABLET, FILM COATED ORAL at 21:07

## 2022-03-03 RX ADMIN — ARIPIPRAZOLE 5 MG: 5 TABLET ORAL at 21:22

## 2022-03-03 RX ADMIN — ROPINIROLE HYDROCHLORIDE 0.25 MG: 0.25 TABLET, FILM COATED ORAL at 21:22

## 2022-03-03 RX ADMIN — PANTOPRAZOLE SODIUM 40 MG: 40 TABLET, DELAYED RELEASE ORAL at 05:50

## 2022-03-03 RX ADMIN — ESCITALOPRAM 5 MG: 10 TABLET, FILM COATED ORAL at 13:59

## 2022-03-03 RX ADMIN — INSULIN LISPRO 3 UNITS: 100 INJECTION, SOLUTION INTRAVENOUS; SUBCUTANEOUS at 14:11

## 2022-03-03 RX ADMIN — PREGABALIN 50 MG: 50 CAPSULE ORAL at 21:11

## 2022-03-03 RX ADMIN — PREGABALIN 50 MG: 50 CAPSULE ORAL at 14:07

## 2022-03-03 RX ADMIN — LEVOTHYROXINE SODIUM 75 MCG: 0.07 TABLET ORAL at 05:50

## 2022-03-03 RX ADMIN — DILTIAZEM HYDROCHLORIDE 90 MG: 30 TABLET, FILM COATED ORAL at 21:07

## 2022-03-03 RX ADMIN — ACETAMINOPHEN 650 MG: 325 TABLET ORAL at 21:06

## 2022-03-03 RX ADMIN — SEVELAMER CARBONATE 1600 MG: 800 TABLET, FILM COATED ORAL at 18:06

## 2022-03-03 RX ADMIN — Medication 10 ML: at 09:00

## 2022-03-03 RX ADMIN — DILTIAZEM HYDROCHLORIDE 90 MG: 30 TABLET, FILM COATED ORAL at 14:01

## 2022-03-03 RX ADMIN — DEXTROSE 15 G: 15 GEL ORAL at 16:39

## 2022-03-03 RX ADMIN — GABAPENTIN 300 MG: 300 CAPSULE ORAL at 14:06

## 2022-03-03 RX ADMIN — Medication 125 MG: at 18:55

## 2022-03-03 RX ADMIN — SEVELAMER CARBONATE 1600 MG: 800 TABLET, FILM COATED ORAL at 13:59

## 2022-03-03 RX ADMIN — SEVELAMER CARBONATE 1600 MG: 800 TABLET, FILM COATED ORAL at 09:47

## 2022-03-03 RX ADMIN — INSULIN LISPRO 2 UNITS: 100 INJECTION, SOLUTION INTRAVENOUS; SUBCUTANEOUS at 09:40

## 2022-03-03 RX ADMIN — ACETAMINOPHEN 650 MG: 325 TABLET ORAL at 13:58

## 2022-03-03 ASSESSMENT — PAIN SCALES - GENERAL
PAINLEVEL_OUTOF10: 7
PAINLEVEL_OUTOF10: 0
PAINLEVEL_OUTOF10: 6
PAINLEVEL_OUTOF10: 0

## 2022-03-03 NOTE — PLAN OF CARE
Patient's chart updated to reflect:      . -HF discharge instructions.       Cesar Gama, RN   Heart Failure Navigator

## 2022-03-03 NOTE — CARE COORDINATION
Cm note: Pt came in for EGD from [facility]. EGD was cancelled d/t hyperglycemia of 588. Pt was placed in observation for control of blood sugar. Checking with liaison on return status.   Pt received transfusion of 1 unit PRBCs today  Placed in isolation for C-diff  Pt goes to HD at [facility] on T/T/S at [facility]

## 2022-03-03 NOTE — PROGRESS NOTES
[address] [place], [place] [address]ist   Progress Note    Admitting Date and Time: 3/2/2022 12:41 PM  Admit Dx: Hyperglycemia [R73.9]    Subjective:    Pt did not answer when asked how she feels.     Per RN: Nothing new to report    ROS: Cannot be obtained as patient would not cooperate with questions     acetaminophen  650 mg Oral See Admin Instructions    diphenhydrAMINE  25 mg Oral See Admin Instructions    sodium chloride flush  5-40 mL IntraVENous 2 times per day    ARIPiprazole  5 mg Oral Nightly    cholestyramine  1 packet Oral BID    dilTIAZem  90 mg Oral 4x Daily    escitalopram  5 mg Oral Daily    levothyroxine  75 mcg Oral Daily    mirtazapine  15 mg Oral Nightly    pantoprazole  40 mg Oral QAM AC    rOPINIRole  0.25 mg Oral Nightly    sevelamer  1,600 mg Oral TID WC    insulin glargine  1 Units SubCUTAneous QAM    pregabalin  50 mg Oral TID    insulin lispro  0-6 Units SubCUTAneous TID WC    insulin lispro  0-3 Units SubCUTAneous Nightly    heparin (porcine)  5,000 Units SubCUTAneous 3 times per day    [START ON 3/4/2022] epoetin nena-epbx  3,000 Units SubCUTAneous Once per day on Mon Wed Fri    And    [START ON 3/4/2022] epoetin nena-epbx  2,000 Units SubCUTAneous Once per day on Mon Wed Fri    insulin lispro  5 Units SubCUTAneous Once     sodium chloride, , PRN  sodium chloride, 25 mL, PRN  sodium chloride flush, 5-40 mL, PRN  sodium chloride, 25 mL, PRN  ondansetron, 4 mg, Q8H PRN   Or  ondansetron, 4 mg, Q6H PRN  polyethylene glycol, 17 g, Daily PRN  acetaminophen, 650 mg, Q6H PRN   Or  acetaminophen, 650 mg, Q6H PRN  glucose, 15 g, PRN  glucagon (rDNA), 1 mg, PRN  dextrose, 100 mL/hr, PRN  docusate sodium, 100 mg, Daily PRN  hydrOXYzine, 25 mg, Q8H PRN  promethazine, 25 mg, Q6H PRN  sennosides-docusate sodium, 2 tablet, Nightly PRN  dextrose bolus (hypoglycemia), 125 mL, PRN   Or  dextrose bolus (hypoglycemia), 250 mL, PRN  albuterol, 2.5 mg, Q4H PRN     Objective:    BP (!) 146/80   Pulse 82   Temp 97.4 °F (36.3 °C) (Oral)   Resp 16   Ht 5' 4\" (1.626 m)   Wt 134 lb 7.7 oz (61 kg)   SpO2 98%   BMI 23.08 kg/m²   General Appearance: alert and oriented to person, place and time and in no acute distress  Skin: warm and dry  Head: normocephalic and atraumatic  Neck: neck supple and non tender without mass   Pulmonary/Chest: clear to auscultation bilaterally- no wheezes, rales or rhonchi, normal air movement, no respiratory distress  Cardiovascular: normal rate, normal S1 and S2 and no carotid bruits  Abdomen: soft, non-tender, non-distended, normal bowel sounds, no masses or organomegaly  Extremities: no cyanosis, no clubbing and no edema  Neurologic: no cranial nerve deficit and speech normal    Recent Labs     03/02/22  1811 03/03/22  0122 03/03/22  0701    139 136   K 3.5 3.9 4.1   CL 98 103 102   CO2 24 27 26   BUN 22* 10 11   CREATININE 3.4* 2.1* 2.4*   GLUCOSE 486* 112* 244*   CALCIUM 8.4* 7.8* 7.9*       No results for input(s): ALKPHOS, PROT, LABALBU, BILITOT, AST, ALT in the last 72 hours. Recent Labs     03/02/22  1811 03/03/22  0701   WBC 6.1 4.4*   RBC 2.59* 2.55*   HGB 7.4* 6.9*   HCT 24.0* 22.2*   MCV 92.7 87.1   MCH 28.6 27.1   MCHC 30.8* 31.1*   RDW 18.2* 17.4*    229   MPV 11.7 11.1     Radiology:   XR KNEE RIGHT (3 VIEWS)    (Results Pending)     Assessment:  Principal Problem:    Hyperglycemia  Active Problems:    Clostridium difficile infection    Anemia due to chronic kidney disease, on chronic dialysis ([address]  Resolved Problems:    * No resolved hospital problems. *    PLAN:     1. Hyperglycemia  -Glucose 244 today  -Continue home lantus and adjust as necessary     2.  Chronic generalized pain   -On lyrica at home, will continue  -Avoid opiates as patient has been seen by pain management previously and recommended against narcotics unless clear indication  -Patient requesting pain medication on initial interview, stated she does not like lyrica  -Will consult pain management again for consideration of different nonopiate pain regimen without lyrica     3. End stage renal disease on hemodialysis   -nephrology consulted for HD management   -Continue sevelamer     4. Type I diabetes mellitus  -continue basal insulin with low dose corrective scale  -hypoglycemia orders in place      5. Sinus tachycardia  -continue diltiazem-hold mornings of dialysis   -HR 95 on admission     6. Anemia of chronic kidney disease  -continue Retacrit  -Hemoglobin 6.9 transfused 1 unit with dialysis 3/3, awaiting 1 hour post H&H     7. Depression  -continue Lexapro and Abilify      8. Diabetic gastroparesis  -Was supposed to have outpatient EGD for pyloric dilation and botox injection today but cancelled for hyperglycemia  -Patient now with C. difficile infection will have patient see gastroenterology as an outpatient to schedule Botox injections     9. R knee pain  -Patient reported she fell on her right leg a couple days ago and her knee has been hurting since. Denies hitting her head or LOC  -R knee is TTP  -Xray     Code Status: Full code  DVT prophylaxis: Heparin        NOTE: This report was transcribed using voice recognition software. Every effort was made to ensure accuracy; however, inadvertent computerized transcription errors may be present.      Electronically signed by Olman Hurd, Ph.D. on 3/3/2022 at 1:43 PM

## 2022-03-03 NOTE — FLOWSHEET NOTE
03/03/22 1332   Observations & Evaluations   Level of Consciousness Alert (0)   Bilateral Breath Sounds Clear   Vital Signs   BP (!) 158/78   Temp 97.2 °F (36.2 °C)   Pulse 98   Resp 18   Weight 129 lb 10.1 oz (58.8 kg)   Weight Method Bed scale   Percent Weight Change -0.84   Pain Assessment   Pain Assessment 0-10   Pain Level 0   During Hemodialysis Assessment   Ultrafiltration Total 700 ml   Comments DIALYSIS COMPLETD PER POLICY AND AS PRESCRIBED. TDC CATHETER DRESSING CLEAN AND INTACT, SITE CLEAN. tOLERATED FAIR   Post-Hemodialysis Assessment   Post-Treatment Procedures Catheter Capped, clamped with Saline x2 ports   Machine Disinfection Process Acid/Vinegar Clean;Heat Disinfect   Rinseback Volume (ml) 300 ml   Total Liters Processed (l/min) 60.1 l/min   Dialyzer Clearance Lightly streaked   Duration of Treatment (minutes) 180 minutes   Heparin amount administered during treatment (units) 0 units   Hemodialysis Intake (ml) 650 ml   Hemodialysis Output (ml) 1150 ml   NET Removed (ml) 500 ml   Tolerated Treatment Fair   Physician Notified?  No

## 2022-03-03 NOTE — CONSULTS
Department of Internal Medicine  Nephrology Nurse Practitioner Consult Note      Reason for Consult:  ESRD on HD  Requesting Physician:  Dr. Lukas Abdullahi: Elective EGD with pyloric dilatation and Botox injection    History Obtained From:  patient, electronic medical record    HISTORY OF PRESENT ILLNESS: Briefly, Miss Daphne Nice is a 36year-old female with a PMH of ESRD on hemodialysis 3 days/wk, on TTS schedule at [facility], Type I DM, poorly controlled with recurrent admissions for DKA, HTN, gastroparesis, ascites, infective endocarditis, cardiac arrest, hypothyroidism and depression who was admitted on March 2, 2022 after she presented for an elective EGD with pyloric dilatation and Botox injection for diabetic gastroparesis, however upon presentation her glucose was greater than 500 and procedure was canceled patient was admitted for further evaluation. Labs on admission were significant for sodium of 133, potassium 5.9, chloride 95, bicarb 24, BUN 20, creatinine 3.2 mg/dl, and blood glucose greater than 500. We are consulted for dialysis management.        Past Medical History:        Diagnosis Date    Acute congestive heart failure ([address]     BC (acute kidney injury) ([address] 10/01/2019    Cardiac arrest ([address] 02/15/2021    Cephalgia 10/09/2019    Chronic kidney disease     Depression     Diabetes mellitus ([address]     Diabetic gastroparesis associated with type 1 diabetes mellitus ([address] 12/17/2018    Diabetic ketoacidosis ([address] 08/27/2011    Diabetic ketoacidosis with coma associated with type 1 diabetes mellitus ([address] 06/26/2013    Diabetic polyneuropathy associated with type 1 diabetes mellitus ([address] 12/27/2020    Drug use complicating pregnancy in third trimester     Endocarditis 10/31/2020    ESRD (end stage renal disease) ([address] 09/29/2020    H/O cardiovascular stress test 08/27/2021    Lexiscan    Hemodialysis patient [facility])     History of blood transfusion 11/2019    Hyperlipidemia 10/08/2020    Hyperosmolar hyperglycemic state (HHS) ([facility] 2020    Hypothyroidism 10/08/2020    Iron deficiency anemia 10/01/2019    MDRO (multiple drug resistant organisms) resistance     MRSA (methicillin resistant Staphylococcus aureus)     back wound abcess    Non compliance w medication regimen 2016    Other disorders of kidney and ureter     Pregnancy 2016    16 weeks    Previous  delivery affecting pregnancy, antepartum 2017    Previous stillbirth or demise, antepartum 2016    Seizure ([facility] 2020    Severe pre-eclampsia in third trimester 2016    Shock liver 02/15/2021    Valvular endocarditis 11/10/2020    This Diagnosis was added to the Problem List based on transcribed orders from Dr. Kassie Luna        Past Surgical History:        Procedure Laterality Date    BACK SURGERY      abscess    CATHETER REMOVAL N/A 10/1/2021    PERITONEAL CATHETER REMOVAL performed by Maggie Jo MD at [facility] 49      x2   [facility], LAPAROSCOPIC N/A 2019    CHOLECYSTECTOMY LAPAROSCOPIC performed by Sara Mosqueda MD at [address] [place] [address] N/A 2018    COLONOSCOPY WITH BIOPSY performed by Rachana Valentin MD at [address] [place] [address] N/A 2018    COLONOSCOPY WITH BIOPSY performed by Autumn Smith MD at [address] [place] [address] ECHO COMPL W [address]  2012    EF 57%    ECHO COMPL W DOP COLOR FLOW  6/10/2013         EMBOLECTOMY N/A 2020    ANGIOVAC, VEGECTOMY, YULISSA -- REQS ROOM 3 performed by Brittnee Engel MD at [address] [place] [address] Left 2021    LEFT LEG INCISION AND DRAINAGE, DEBRIDEMENT, WOUND VAC APPLICATION performed by Albino Martins DPM at [address] [place] [address] Left 2021    LEFT LEG DEBRIDEMENT BIOPSY POSS APPLICATION WOUND VAC performed by Albino Martins DPM at [facility]. 2020    LAPAROSCOPIC INSERTION PERITONEAL DIALYSIS CATHETER performed by Poonam Sapp MD at [facility] 80 ESOPHAGOGASTRODUODENOSCOPY TRANSORAL DIAGNOSTIC N/A 5/30/2018    EGD ESOPHAGOGASTRODUODENOSCOPY performed by Milady Conte MD at [address] [facility] TRANSESOPHAGEAL ECHOCARDIOGRAM N/A 10/19/2020    TRANSESOPHAGEAL ECHOCARDIOGRAM WITH BUBBLE STUDY performed by Melissa Samuel MD at [address] [facility]"" [address] [address]  04/2018    UPPER GASTROINTESTINAL ENDOSCOPY  12/18/2018    EGD BIOPSY performed by Cleatus Snellen, MD at [address] [facility] N/A 10/11/2019    EGD ESOPHAGOGASTRODUODENOSCOPY performed by Millicent Felder DO at [address] [place] [address] N/A 7/14/2021    EGD BIOPSY performed by Pepito Valladares MD at [facility] ENDOSCOPY     Current Medications:    Current Facility-Administered Medications: 0.9 % sodium chloride infusion, , IntraVENous, PRN  diphenhydrAMINE (BENADRYL) tablet 25 mg, 25 mg, Oral, See Admin Instructions  0.9 % sodium chloride infusion, 25 mL, IntraVENous, PRN  0.9 % sodium chloride infusion, , IntraVENous, Continuous  sodium chloride flush 0.9 % injection 5-40 mL, 5-40 mL, IntraVENous, 2 times per day  sodium chloride flush 0.9 % injection 5-40 mL, 5-40 mL, IntraVENous, PRN  0.9 % sodium chloride infusion, 25 mL, IntraVENous, PRN  ondansetron (ZOFRAN-ODT) disintegrating tablet 4 mg, 4 mg, Oral, Q8H PRN **OR** ondansetron (ZOFRAN) injection 4 mg, 4 mg, IntraVENous, Q6H PRN  polyethylene glycol (GLYCOLAX) packet 17 g, 17 g, Oral, Daily PRN  acetaminophen (TYLENOL) tablet 650 mg, 650 mg, Oral, Q6H PRN **OR** acetaminophen (TYLENOL) suppository 650 mg, 650 mg, Rectal, Q6H PRN  glucose (GLUTOSE) 40 % oral gel 15 g, 15 g, Oral, PRN  glucagon (rDNA) injection 1 mg, 1 mg, IntraMUSCular, PRN  dextrose 5 % solution, 100 mL/hr, IntraVENous, PRN  ARIPiprazole (ABILIFY) tablet 5 mg, 5 mg, Oral, Nightly  cholestyramine (QUESTRAN) packet 4 g, 1 packet, Oral, BID  dilTIAZem (CARDIZEM) tablet 90 mg, 90 mg, Oral, 4x Daily  docusate sodium (COLACE) capsule 100 mg, 100 mg, Oral, Daily PRN  escitalopram (LEXAPRO) tablet 5 mg, 5 mg, Oral, Daily  hydrOXYzine (ATARAX) tablet 25 mg, 25 mg, Oral, Q8H PRN  levothyroxine (SYNTHROID) tablet 75 mcg, 75 mcg, Oral, Daily  mirtazapine (REMERON) tablet 15 mg, 15 mg, Oral, Nightly  pantoprazole (PROTONIX) tablet 40 mg, 40 mg, Oral, QAM AC  rOPINIRole (REQUIP) tablet 0.25 mg, 0.25 mg, Oral, Nightly  promethazine (PHENERGAN) tablet 25 mg, 25 mg, Oral, Q6H PRN  sennosides-docusate sodium (SENOKOT-S) 8.6-50 MG tablet 2 tablet, 2 tablet, Oral, Nightly PRN  sevelamer (RENVELA) tablet 1,600 mg, 1,600 mg, Oral, TID WC  insulin glargine (LANTUS) injection vial 1 Units, 1 Units, SubCUTAneous, QAM  pregabalin (LYRICA) capsule 50 mg, 50 mg, Oral, TID  insulin lispro (HUMALOG) injection vial 0-6 Units, 0-6 Units, SubCUTAneous, TID WC  insulin lispro (HUMALOG) injection vial 0-3 Units, 0-3 Units, SubCUTAneous, Nightly  dextrose bolus (hypoglycemia) 10% 125 mL, 125 mL, IntraVENous, PRN **OR** dextrose bolus (hypoglycemia) 10% 250 mL, 250 mL, IntraVENous, PRN  heparin (porcine) injection 5,000 Units, 5,000 Units, SubCUTAneous, 3 times per day  albuterol (PROVENTIL) nebulizer solution 2.5 mg, 2.5 mg, Nebulization, Q4H PRN  [START ON 3/4/2022] epoetin nena-epbx (RETACRIT) injection 3,000 Units, 3,000 Units, SubCUTAneous, Once per day on Mon Wed Fri **AND** [START ON 3/4/2022] epoetin nena-epbx (RETACRIT) injection 2,000 Units, 2,000 Units, SubCUTAneous, Once per day on Mon Wed Fri  insulin lispro (HUMALOG) injection vial 5 Units, 5 Units, SubCUTAneous, Once  Allergies:  Cefepime and Toradol [ketorolac tromethamine]    Social History:    TOBACCO:   reports that she quit smoking about 13 months ago. Her smoking use included cigarettes. She has a 3.00 pack-year smoking history.  She has never used smokeless tobacco.  ETOH:   reports no history of alcohol use.    Family History:       Problem Relation Age of Onset   Nick Asthma Mother     Hypertension Mother     High Blood Pressure Mother     Diabetes Mother     Asthma Brother     High Blood Pressure Father      REVIEW OF SYSTEMS:    CONSTITUTIONAL:  positive for  fatigue and malaise  EYES:  negative  HEENT:  negative for  hearing loss and epistaxis  RESPIRATORY:  positive for dyspnea  CARDIOVASCULAR:  negative for  chest pain, dyspnea  GASTROINTESTINAL:  positive for nausea and vomiting and abdominal pain  GENITOURINARY:  negative  INTEGUMENT/BREAST:  negative  HEMATOLOGIC/LYMPHATIC:  negative  ALLERGIC/IMMUNOLOGIC:  positive for drug reactions  ENDOCRINE:  positive for weight changes    MUSCULOSKELETAL:  negative  NEUROLOGICAL:  negative  PHYSICAL EXAM:      Vitals:    VITALS:  BP (!) 142/66   Pulse 98   Temp 97.2 °F (36.2 °C)   Resp 18   Ht 5' 4\" (1.626 m)   Wt 129 lb 10.1 oz (58.8 kg)   SpO2 98%   BMI 22.25 kg/m²   24HR INTAKE/OUTPUT:    Intake/Output Summary (Last 24 hours) at 3/3/2022 1416  Last data filed at 3/3/2022 1332  Gross per 24 hour   Intake 2229.34 ml   Output 2450 ml   Net -220.66 ml       Access: RIJ tunneled dialysis catheter  Constitutional:  Lethargic, but wakens to voice  HEENT:  PERRL, normocephalic, atraumatic  Respiratory:  CTA bilateral   Cardiovascular/Edema:  ST, RRR, no murmur gallop or rub  Gastrointestinal:  abd distended, c/o persistent abdominal pain  Neurologic:  Lethargic, awakens to voice, follows commands, no focal deficit  Skin:  Warm, dry, ecchymotic areas to abdomen  Other:      DATA:    CBC:   Lab Results   Component Value Date    WBC 4.4 03/03/2022    RBC 2.55 03/03/2022    HGB 6.9 03/03/2022    HCT 22.2 03/03/2022    MCV 87.1 03/03/2022    MCH 27.1 03/03/2022    MCHC 31.1 03/03/2022    RDW 17.4 03/03/2022     03/03/2022    MPV 11.1 03/03/2022     CMP:    Lab Results   Component Value Date     03/03/2022    K 4.1 03/03/2022     03/03/2022    CO2 26 03/03/2022    BUN 11 03/03/2022    CREATININE 2.4 03/03/2022    GFRAA 29 03/03/2022    LABGLOM 29 03/03/2022    GLUCOSE 244 03/03/2022    GLUCOSE 130 05/18/2012    PROT 6.2 02/01/2022    LABALBU 2.7 02/01/2022    LABALBU 4.1 05/18/2012    CALCIUM 7.9 03/03/2022    BILITOT 0.4 02/01/2022    ALKPHOS 241 02/01/2022    AST 15 02/01/2022    ALT 11 02/01/2022     Magnesium:    Lab Results   Component Value Date    MG 2.0 03/02/2022     Phosphorus:    Lab Results   Component Value Date    PHOS 2.9 02/04/2022     Radiology Review:        BRIEF SUMMARY OF INITIAL CONSULT:    Briefly, Miss Fabio Gibbs is a 36year-old female with a PMH of ESRD on hemodialysis 3 days/wk, on TTS schedule at [facility], Type I DM, poorly controlled with recurrent admissions for DKA, HTN, gastroparesis, ascites, infective endocarditis, cardiac arrest, hypothyroidism and depression who was admitted on March 2, 2022 after she presented for an elective EGD with pyloric dilatation and Botox injection for diabetic gastroparesis, however upon presentation her glucose was greater than 500 and procedure was canceled patient was admitted for further evaluation. Labs on admission were significant for sodium of 133, potassium 5.9, chloride 95, bicarb 24, BUN 20, creatinine 3.2 mg/dl, and blood glucose greater than 500. We are consulted for dialysis management. IMPRESSION/RECOMMENDATIONS:      1. ESRD 3 times a week TTS via RIJ tunneled dialysis catheter. HD initiated September 2021 after failed peritoneal dialysis with persistent hyperkalemia. For dialysis three times/wk TTS while inpatient. For dialysis tomorrow.      2. Hyperkalemia, secondary to decreased GFR from ESRD. Resolved. 3. HTN, on lopressor and amlodipine at home  4. Vitamin D deficiency, on ergocalciferol at home  5. MBD of CKD, on sevelamer (hold until phosphorus level obtained)  6. Anemia of CKD, Hgb 6.9 mg/dL today, continue epoetin alpha 3,000 unit 3 times/wk.  for transfusion    7. Type I DM, poorly controlled with frequent readmissions for DKA, blood glucose on admission greater than 500  ------------------------------------------------------  9. Recent Covid 19 infection, unvaccinated  8. Restless leg syndrome, on ropinirole at home  11. Depression, on escitalopram and Abilify  12. Hypothyroidism, on levothyroxine   13. History of gastroparesis, on metoclopramide. For EGD and pyloric dilatation with Botox injection inpatient versus outpatient per GI.  14. C. difficile, on oral vancomycin  15.  Nutrition, low potassium diet    Plan:    · Continue HD today and TTS while inpatient  · Continue epoetin alpha 3,000 units 3 times/wk  · Obtain phosphorus levels  · Obtain cbc in am  · Obtain ionized calcium in am  · Monitor labs daily  · Monitor glucose levels    Thank you Dr. Inocencio Simpson for allowing us to participate in the care of [address] [place] [address]  Electronically signed by HEBER Cordova CNP on 3/3/2022 at 2:19 PM

## 2022-03-03 NOTE — CARE COORDINATION
Patient seen and examined at bedside. Undergoing dialysis today. Benign abdominal exam with some mild diffuse tenderness. Bowel movements without blood or melena per patient  Decreased H&H.      1.  Gastroparesis  -Diabetic gastroparesis  -EGD with Botox injection postponed secondary to significant hyperglycemia  -Plan for EGD inpatient versus outpatient depending on patient clinical course    2. Anemia  -Acute on chronic without evidence of overt bleed  -Transfusion per admitting  -EGD as above    Above has been discussed with the patient and all questions answered to her satisfaction. She is agreeable with the plan as delineated.     Anamaria Siu MD

## 2022-03-03 NOTE — FLOWSHEET NOTE
03/02/22 2056   Vital Signs   /77   Pulse 93   Resp 18   Weight 134 lb 7.7 oz (61 kg)   Weight Method Bed scale   Percent Weight Change -1.61   Pain Assessment   Pain Assessment 0-10   Pain Level 0   Post-Hemodialysis Assessment   Post-Treatment Procedures Blood returned;Catheter capped, clamped and heparinized x 2 ports   Machine Disinfection Process Acid/Vinegar Clean;Heat Disinfect   Rinseback Volume (ml) 300 ml   Total Liters Processed (l/min) 44.5 l/min   Dialyzer Clearance Lightly streaked   Duration of Treatment (minutes) 120 minutes   Hemodialysis Intake (ml) 300 ml   Hemodialysis Output (ml) 1300 ml   NET Removed (ml) 1000 ml   Bilateral Breath Sounds Clear;Diminished   Edema Right lower extremity; Left lower extremity   RLE Edema Trace   LLE Edema Trace

## 2022-03-03 NOTE — DISCHARGE SUMMARY
[facility] Physician Discharge Summary       [address] Petit MD  [address] [facility]  [phone]    Call today  Heart Failure follow up and education. , Please set a visit within 3-7 days of [facility] D/C    Z Laura Perez MD  [phone]    Schedule an appointment as soon as possible for a visit in 1 week  To be evaluated for the Botox injections via EGD. Activity level: As tolerated    Diet: ADULT DIET; Regular; 3 carb choices (45 gm/meal); Low Potassium (Less than 3000 mg/day) check your blood sugars and stay informed with your primary care should you have any concerns. Labs: Per nephrology    Condition at discharge: Improving    Dispo: Home    Patient ID:  Maurice Dean  [id number]  36 y.o.  1990    Admit date: 3/2/2022    Discharge date and time:  3/3/2022  4:55 PM    Admission Diagnoses: Principal Problem:    Hyperglycemia  Active Problems:    Clostridium difficile infection    Anemia due to chronic kidney disease, on chronic dialysis [facility])  Resolved Problems:    * No resolved hospital problems. *    Discharge Diagnoses: Principal Problem:    Hyperglycemia  Active Problems:    Clostridium difficile infection    Anemia due to chronic kidney disease, on chronic dialysis ([facility]  Resolved Problems:    * No resolved hospital problems.  *    Consults:  IP CONSULT TO PAIN MANAGEMENT  IP CONSULT TO NEPHROLOGY    Procedures: Hemodialysis day of discharge    Hospital Course:   Atypical chest pain  -unlikely cardiac given troponin lower than prior admission and trend is downward, no acute ischemic changes on EKG, spontaneous resolution, and unremarkable nuclear stress in 8/2021  -monitor on telemetry   -repeat EKG does not show significant ischemic changes  Chronic generalized pain   -hold off on narcotic medications unless clear acute indication per prior pain management recommendation   -Lyrica increased to tid by pain management  End stage renal disease on hemodialysis with volume overload  -nephrology following for HD management   Type I diabetes mellitus  -continue basal insulin with low dose corrective scale  -hypoglycemia orders in place   Sinus tachycardia resolved  -continue diltiazem-gold mornings of dialysis   Anemia of chronic kidney disease  -continue Retacrit  -monitor hemoglobin we will transfuse today for a hemoglobin of 6.9  Depression  -continue Lexapro and Abilify   C. difficile infection  -Begun on vancomycin 125 mg 4 times daily for 10 days number 40 tablets was sent to her regular pharmacy for pickup tonight    Discharge Exam:  General Appearance: alert and oriented to person, place and time. Appears frustrated but not in distress   Skin: warm and dry  Head: normocephalic and atraumatic  Eyes: pupils equal, round, and reactive to light, extraocular eye movements intact, conjunctivae normal  Neck: neck supple and non tender without mass   Pulmonary/Chest: Non-labored on room air. Clear to auscultation bilaterally   Cardiovascular: normal rate, normal S1 and S2 with flow murmur and no carotid bruits. No appreciable JVD  Abdomen: soft, non-tender, mildly distended, normal bowel sounds, no masses or organomegaly  Extremities: no cyanosis, no clubbing and no edema  Neurologic: no cranial nerve deficit and speech normal    Vitals:    03/03/22 1300 03/03/22 1305 03/03/22 1332 03/03/22 1401   BP: (!) 168/92 (!) 146/80 (!) 158/78 (!) 142/66   Pulse: 99 82 98    Resp: 18 16 18    Temp:  97.4 °F (36.3 °C) 97.2 °F (36.2 °C)    TempSrc:  Oral     SpO2:       Weight:   129 lb 10.1 oz (58.8 kg)    Height:         I/O last 3 completed shifts: In: 1219.3 [P.O.:150; I.V.:769.3]  Out: 1300   I/O this shift:  In: 1250 [P.O.:240;  I.V.:10; Blood:350]  Out: 1150     LABS:  Recent Labs     03/03/22  0122 03/03/22  0701 03/03/22  1431    136 138   K 3.9 4.1 3.5    102 105   CO2 27 26 29   BUN 10 11 4* type 1 diabetes mellitus with hyperglycemia (HCC)      sevelamer (RENVELA) 800 MG tablet Take 2 tablets by mouth 3 times daily (with meals)      insulin lispro (HUMALOG) 100 UNIT/ML injection vial Inject 0-3 Units into the skin 4 times daily (before meals and nightly)  Qty: 10 mL, Refills: 3      escitalopram (LEXAPRO) 5 MG tablet Take 1 tablet by mouth daily  Qty: 30 tablet, Refills: 3      dilTIAZem (CARDIZEM) 90 MG tablet Take 1 tablet by mouth 4 times daily Hold the morning of Hemodialysis  Hold For SBP <100, HR<55  Qty: 120 tablet, Refills: 3      insulin glargine (LANTUS) 100 UNIT/ML injection vial Inject 1 Units into the skin every morning  Qty: 10 mL, Refills: 3      cholestyramine (QUESTRAN) 4 g packet Take 1 packet by mouth 2 times daily  Qty: 90 packet, Refills: 0      promethazine (PHENERGAN) 25 MG tablet Take 25 mg by mouth every 6 hours as needed for Nausea      ondansetron (ZOFRAN) 4 MG tablet Take 4 mg by mouth every 8 hours as needed for Nausea or Vomiting      albuterol sulfate  (90 Base) MCG/ACT inhaler Inhale 4 puffs into the lungs as needed for Wheezing or Shortness of Breath  Qty: 18 g, Refills: 0      vitamin D (ERGOCALCIFEROL) 1.25 MG (88011 UT) CAPS capsule Take 1 capsule by mouth once a week  Qty: 5 capsule, Refills: 0      mirtazapine (REMERON) 15 MG tablet Take 15 mg by mouth nightly      ARIPiprazole (ABILIFY) 5 MG tablet Take 5 mg by mouth nightly      levothyroxine (SYNTHROID) 75 MCG tablet TAKE 1 TABLET BY MOUTH IN THE MORNING BEFORE BREAKFAST  Qty: 60 tablet, Refills: 0      rOPINIRole (REQUIP) 0.25 MG tablet Take 0.25 mg by mouth nightly         STOP taking these medications       docusate sodium (COLACE) 100 MG capsule Comments:   Reason for Stopping:         sennosides-docusate sodium (SENOKOT-S) 8.6-50 MG tablet Comments:   Reason for Stopping:         polyethylene glycol (GLYCOLAX) 17 g packet Comments:   Reason for Stopping:             Note that greater than 30 minutes was spent in preparing discharge papers, discussing discharge with patient, medication review, etc.    NOTE: This report was transcribed using voice recognition software. Every effort was made to ensure accuracy; however, inadvertent computerized transcription errors may be present.      Signed:  Electronically signed by Tony Botello, Ph.D. on 3/3/2022 at @NOW

## 2022-03-03 NOTE — DISCHARGE INSTR - COC
Continuity of Care Form    Patient Name: Qian Carballo   :  1990  MRN:  [id number]    Admit date:  3/2/2022  Discharge date:  3/4/2022    Code Status Order: Full Code   Advance Directives:      Admitting Physician:  Russ Campbell MD  PCP: Lupie Holter, MD    Discharging Nurse: [facility] Unit/Room#: 0331/0331-01  Discharging Unit Phone Number: [phone]    Emergency Contact:   Extended Emergency Contact Information  Primary Emergency Contact: [address] Phone: [phone]  Mobile Phone: [phone]  Relation: Parent   needed?  No    Past Surgical History:  Past Surgical History:   Procedure Laterality Date    BACK SURGERY      abscess    CATHETER REMOVAL N/A 10/1/2021    PERITONEAL CATHETER REMOVAL performed by Claudia Gallo MD at [place] [address]    CHOLECYSTECTOMY, LAPAROSCOPIC N/A 2019    CHOLECYSTECTOMY LAPAROSCOPIC performed by Bartolo Wise MD at [address] [place] [address] N/A 2018    COLONOSCOPY WITH BIOPSY performed by Han Vences MD at [facility] N/A 2018    COLONOSCOPY WITH BIOPSY performed by Sonya Green MD at . [address]  2012    EF 57%    ECHO COMPL W DOP COLOR FLOW  6/10/2013         EMBOLECTOMY N/A 2020    [address] [place] [address], [id number] [place] [address], [facility] -- REQS ROOM 3 performed by Natasha Herzog MD at [address] [place] [address] Left 2021    LEFT LEG INCISION AND DRAINAGE, DEBRIDEMENT, WOUND VAC APPLICATION performed by Brandi Collier DPM at [address] [place] [address] Left 2021    LEFT LEG DEBRIDEMENT BIOPSY POSS APPLICATION WOUND VAC performed by Brandi Collier DPM at [facility] N/A 2020    LAPAROSCOPIC INSERTION PERITONEAL DIALYSIS CATHETER performed by Shira Montero MD at [facility]  ESOPHAGOGASTRODUODENOSCOPY TRANSORAL DIAGNOSTIC N/A 2018    EGD ESOPHAGOGASTRODUODENOSCOPY performed by Sheela Newton MD at [facility] ENDOSCOPY    TRANSESOPHAGEAL ECHOCARDIOGRAM N/A 10/19/2020    TRANSESOPHAGEAL ECHOCARDIOGRAM WITH BUBBLE STUDY performed by Elijah Hunt MD at [facility]    TUNNELED VENOUS PORT PLACEMENT  04/2018    UPPER GASTROINTESTINAL ENDOSCOPY  12/18/2018    EGD BIOPSY performed by Lieutenant Spurling, MD at [place] [address] N/A 10/11/2019    EGD ESOPHAGOGASTRODUODENOSCOPY performed by Emelina Danielle DO at [place] [address] N/A 7/14/2021    EGD BIOPSY performed by Mt Hernandes MD at [facility] ENDOSCOPY       Immunization History:   Immunization History   Administered Date(s) Administered    Influenza Virus Vaccine 10/22/2011, 10/23/2012    Influenza, [place], 6 mo and older, IM, PF (Flulaval, Fluarix) 12/15/2018    Influenza, Quadv, IM, PF (6 mo and older Fluzone, Flulaval, Fluarix, and 3 yrs and older Afluria) 03/16/2017, 09/29/2017    Tdap (Boostrix, Adacel) 07/19/2016, 08/26/2016, 06/24/2017       Active Problems:  Patient Active Problem List   Diagnosis Code    Non compliance w medication regimen Z91.14    Tobacco smoking complicating pregnancy H74.155    MTHFR mutation Z15.89    Generalized abdominal pain R10.84    Diabetic ketoacidosis without coma associated with type 1 diabetes mellitus (HCC) E10.10    Hypertension I10    Prolonged QT interval R94.31    Iron deficiency anemia D50.9    Sinus tachycardia R00.0    Bladder dysfunction N31.9    Hypokalemia E87.6    Severe protein-calorie malnutrition ([address] E43    Uncontrolled type 1 diabetes mellitus with hyperglycemia (HCC) E10.65    End stage renal disease (HCC) N18.6    Hypothyroidism E03.9    Hyperlipidemia E78.5    Acute cystitis N30.00    Nondisplaced fracture of neck of fifth metacarpal bone, left hand, initial encounter for closed fracture S62.367A    Chronic right-sided low back pain M54.50, G89.29    Endocarditis I38    Seizure ([facility] R56.9    Hyperkalemia E87.5 Hypomagnesemia E83.42    Hypocalcemia E83.51    Intractable nausea and vomiting R11.2    Wound of left leg, sequela S81.802S    Syncope R55    Type 1 diabetes mellitus without complication ([facility]) J09.8    Hyperosmolality E87.0    Major depressive disorder F32.9    Lactic acid acidosis E87.2    Early satiety R68.81    Acute on chronic systolic CHF (congestive heart failure) ([facility]) I50.23    Other chest pain R07.89    Chronic renal failure syndrome N18.9    Septic shock ([facility]) A41.9, R65.21    ESRD (end stage renal disease) ([facility]) N18.6    Hyperosmolar hyperglycemic state (HHS) ([facility]) E11.00, E11.65    Hypertensive urgency I16.0    Nausea R11.0    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    ESRD (end stage renal disease) on dialysis ([facility] N18.6, Z99.2    AMS (altered mental status) R41.82    Opioid overdose ([facility] T40.2X1A    Anemia D64.9    Type 1 diabetes mellitus with chronic kidney disease on chronic dialysis ([facility]) E10.22, N18.6, Z99.2    Elevated TSH R79.89    Diabetic acidosis without coma ([facility]) E11.10    DKA, type 1, not at goal [facility]) E10.10    COVID-19 virus infection U07.1    Frequent hospital admissions Z78.9    Diabetic gastroparesis ([facility]) E11.43, K31.84    Diffuse pain R52    Poor prognosis Z78.9    Physical deconditioning R53.81    Declining functional status R53.81    Lack of motivation Z91.89    Drug-seeking behavior Z76.5    Behavior problem, adult F69    Volume overload E87.70    Hyperglycemia R73.9       Isolation/Infection:   Isolation            C Diff Contact          Patient Infection Status       Infection Onset Added Last Indicated Last Indicated By Review Planned Expiration Resolved Resolved By    C-diff Rule Out 02/01/22 02/01/22 03/02/22 CLOSTRIDIUM DIFFICILE EIA (Ordered)        Resolved    C-diff Rule Out 01/15/22 01/15/22 01/16/22 CLOSTRIDIUM DIFFICILE EIA (Ordered)   01/18/22 Kamar Servin RN    Clostridium difficile Toxin Antigen (rapid) was cancelled on 01/16/2022 at   11:02 by [facility]; Cancelled: Test requires liquid stool only. C-diff Rule Out 01/02/22 01/02/22 01/02/22 CLOSTRIDIUM DIFFICILE EIA (Ordered)   01/03/22 Cristiana Barrett RN    Order discontinued    COVID-19 12/31/21 12/31/21 12/31/21 COVID-19, Rapid   01/20/22 Ashlyn Baker RN    Covid + 12/31/22-ad[facility]  Admitted 1/14/22-No supplemental oxygen, SaO2-95%, Afebrile, has not received any fever reducing medications in last 24 hours.  [place], 1/20/22    COVID-19 (Rule Out) 12/31/21 12/31/21 12/31/21 COVID-19, Rapid (Ordered)   12/31/21 Rule-Out Test Resulted    COVID-19 (Rule Out) 12/07/21 12/07/21 12/07/21 COVID-19, Rapid (Ordered)   12/07/21 Rule-Out Test Resulted    COVID-19 (Rule Out) 11/22/21 11/22/21 11/22/21 COVID-19, Rapid (Ordered)   11/22/21 Rule-Out Test Resulted    COVID-19 (Rule Out) 11/11/21 11/11/21 11/12/21 COVID-19, Rapid (Ordered)   11/12/21 Rule-Out Test Resulted    VRE 10/01/21 10/04/21 10/01/21 Culture, Tip   11/15/21 Isaias Chandler RN    Enterococcus faecium abdomen 10-1-21    COVID-19 (Rule Out) 09/28/21 09/28/21 09/28/21 Respiratory Panel, Molecular, with COVID-19 (Restricted: peds pts or suitable admitted adults) (Ordered)   09/28/21 Rule-Out Test Resulted    COVID-19 (Rule Out) 09/28/21 09/28/21 09/28/21 COVID-19, Rapid (Ordered)   09/28/21 Rule-Out Test Resulted    COVID-19 (Rule Out) 09/07/21 09/07/21 09/07/21 COVID-19, Rapid (Ordered)   09/08/21 Rule-Out Test Resulted    C-diff Rule Out 07/10/21 07/10/21 07/10/21 CLOSTRIDIUM DIFFICILE EIA (Ordered)   07/13/21 Cristiana Barrett RN    Order discontinued    COVID-19 (Rule Out) 07/10/21 07/10/21 07/10/21 Respiratory Panel, Molecular, with COVID-19 (Restricted: peds pts or suitable admitted adults) (Ordered)   07/10/21 Rule-Out Test Resulted    MDRO (multi-drug resistant organism)  07/02/21 07/02/21 Cristiana Barrett RN   09/09/21 Isaias Chandler RN    Enterococcus faecium, urine, 6-29-21    VRE 06/29/21 07/01/21 06/29/21 Culture, Urine 21 Pratima Rojas RN    Enterococcus faecium, urine, 21     C-diff Rule Out 21 CLOSTRIDIUM DIFFICILE EIA (Ordered)   21 Chiquis Quevedo RN    Order discontinued    MRSA 21 Culture, Surgical   21 Pratima Rojas RN    Wound-Left Leg 21, 2021, 2021    COVID-19 (Rule Out) 21 COVID-19 (Ordered)   21     COVID-19 (Rule Out) 21 COVID-19, Rapid (Ordered)   21 Rule-Out Test Resulted    C-diff Rule Out 21 CLOSTRIDIUM DIFFICILE EIA (Ordered)   21 Chiquis Quevedo RN    Order discontinued by RN/physician.     COVID-19 (Rule Out) 02/15/21 02/15/21 02/15/21 COVID-19 (Ordered)   02/15/21 Rule-Out Test Resulted    COVID-19 (Rule Out) 20 Respiratory Panel, Molecular, with COVID-19 (Restricted: peds pts or suitable admitted adults) (Ordered)   20 Rule-Out Test Resulted    C-diff Rule Out 20 Clostridium difficile EIA (Ordered)   21 Pratima Rojas RN    C-diff Rule Out 10/31/20 10/31/20 10/31/20 C. difficile toxin Molecular (Ordered)   10/31/20 Rule-Out Test Resulted    C-diff Rule Out 10/31/20 10/31/20 10/31/20 CLOSTRIDIUM DIFFICILE EIA (Ordered)   10/31/20 Rule-Out Test Resulted    COVID-19 (Rule Out) 10/31/20 10/31/20 10/31/20 COVID-19 (Ordered)   10/31/20 Rule-Out Test Resulted    COVID-19 (Rule Out) 10/20/20 10/20/20 10/20/20 COVID-19 (Ordered)   10/20/20 Rule-Out Test Resulted    C-diff Rule Out 10/13/20 10/13/20 10/13/20 CLOSTRIDIUM DIFFICILE EIA (Ordered)   10/14/20 Rule-Out Test Resulted    COVID-19 (Rule Out) 20 COVID-19 (Ordered)   20     VRE 20 Culture, Urine   10/22/20 Yolanda Engel RN    Enterococcus faecium Urine 20  Cleared urine 10/8/20    C-diff Rule Out 20 C. difficile toxin Molecular (Ordered)   07/19/20 Rule-Out Test Resulted    COVID-19 (Rule Out) 06/22/20 06/22/20 06/22/20 Covid-19 Ambulatory (Ordered)   06/24/20 Rule-Out Test Resulted    MRSA 02/11/20 02/13/20 02/11/20 BODY FLUID CULTURE   02/13/20 Haylee Main RN    MRSA 02/08/20 02/10/20 02/08/20 MRSA SCREENING CULTURE ONLY   02/11/20 Haylee Main RN    ESBL (Extended Spectrum Beta Lactamase)  02/02/16 02/02/16 Haylee Main RN   07/11/16 Diamond Munoz RN    ESBL+ E Coli urine 1/25/16            Nurse Assessment:  Last Vital Signs: /82   Pulse 103   Temp 98.9 °F (37.2 °C) (Oral)   Resp 16   Ht 5' 4\" (1.626 m)   Wt 134 lb 7.7 oz (61 kg)   SpO2 98%   BMI 23.08 kg/m²     Last documented pain score (0-10 scale): Pain Level: 0  Last Weight:   Wt Readings from Last 1 Encounters:   03/02/22 134 lb 7.7 oz (61 kg)     Mental Status:  oriented and alert    IV Access:  Hemodialysis catheter in the right internal jugular    Nursing Mobility/ADLs:  Walking   Assisted  Transfer  Assisted  Bathing  Assisted  Dressing  Assisted  Toileting  Assisted  Feeding  Independent  Med Admin  Independent  Med Delivery   whole    Wound Care Documentation and Therapy:  Wound 02/01/22 Rectum Medial redness swelling (Active)   Dressing Status Clean;Dry 03/02/22 1729   Wound Cleansed Soap and water 03/02/22 1729   Dressing/Treatment Moisture barrier 03/02/22 1729   Wound Assessment Denuded;Pink/red; Other (Comment) 03/02/22 1729   Drainage Amount Scant 03/02/22 1729   Drainage Description Sanguinous 03/02/22 1729   Odor None 03/02/22 1729   Wanda-wound Assessment Excoriated 03/02/22 1729   Number of days: 30        Elimination:  Continence: Bowel: No  Bladder: No  Urinary Catheter: None   Colostomy/Ileostomy/Ileal Conduit: No       Date of Last BM: 3/4/2022    Intake/Output Summary (Last 24 hours) at 3/3/2022 1021  Last data filed at 3/3/2022 0600  Gross per 24 hour   Intake 1219.34 ml   Output 1300 ml   Net -80.66 ml     I/O last 3 completed shifts:   In: 1219.3 [P.O.:150; I.V.:769.3]  Out: 1300     Safety Concerns: At Risk for Falls    Impairments/Disabilities:      Awais Bhatti CAITLIN Impairments/Disabilities:[id number]}    Nutrition Therapy:  Current Nutrition Therapy:   - Oral Diet:  Carb Control 4 carbs/meal (1800kcals/day) and Low potassium    Routes of Feeding: Oral  Liquids: Thin Liquids  Daily Fluid Restriction: no  Last Modified Barium Swallow with Video (Video Swallowing Test): not done    Treatments at the Time of Hospital Discharge:   Respiratory Treatments: ***  Oxygen Therapy:  is not on home oxygen therapy. Ventilator:    - No ventilator support    Rehab Therapies: Physical Therapy and Occupational Therapy  Weight Bearing Status/Restrictions: No weight bearing restirctions  Other Medical Equipment (for information only, NOT a DME order):  {EQUIPMENT:[id number]}  Other Treatments: ***    Patient's personal belongings (please select all that are sent with patient):  {[facility] DME Belongings:[id number]}    RN SIGNATURE:  Electronically signed by Dhaval Anand RN on 3/4/22 at 4:23 PM EST    CASE MANAGEMENT/SOCIAL WORK SECTION    Inpatient Status Date: 3/2/22    Readmission Risk Assessment Score:  Readmission Risk              Risk of Unplanned Readmission:  0           Discharging to Facility/ Agency   Name: [facility]  [address] [place] [address]         Phone: [phone]       Fax: [phone]          Dialysis Facility (if applicable)   Name:  Address:  Dialysis Schedule:  Phone:  Fax:    / signature: Electronically signed by Carol Olmstead RN on 3/4/22 at 1:52 PM EST    PHYSICIAN SECTION    Prognosis: Good    Condition at Discharge: Stable    Rehab Potential (if transferring to Rehab):  Fair    Recommended Labs or Other Treatments After Discharge: ***    Physician Certification: I certify the above information and transfer of [address]  is necessary for the continuing treatment of the diagnosis listed and that she requires [place] for less 30 days. Update Admission H&P: {[facility] DME Changes in EQUDW:[id number]}    PHYSICIAN SIGNATURE:  Electronically signed by Fercho Perez MD on 3/4/22 at 11:47 AM EST                       HEART FAILURE  / CONGESTIVE HEART FAILURE  DISCHARGE INSTRUCTIONS:  GUIDELINES TO FOLLOW AT [address]:     MEDICATIONS:  Take your medication as directed. If you are experiencing any side effects, inform your doctor, Do not stop taking any of your medications without letting your doctor know. Check with your doctor before taking any over-the-counter medications / herbal / or dietary supplements. They may interfere with your other medications. Do not take ibuprofen (Advil or Motrin) and naproxen (Aleve) without talking to your doctor first. They could make your heart failure worse. WEIGHT MONITORING:   Weigh yourself everyday (with the same scale) around the same time of the day and write it down. (you can chart them on a calendar or keep track of them on paper. Notify your doctor of a weight gain of 3 pounds or more in 1 day   OR a total of 5 pounds or more in 1 week    Take your weight record to your doctor visits  Also, the same goes if you loose more than 3# in one day, let your heart doctor know. DIET:   Cardiac heart healthy diet- Low saturated / low trans fat, no added salt, caffeine restricted, Low sodium diet-   No more than 2,000mg (2 grams) of salt / sodium per day (which equals to a little less than  a teaspoon of salt)  If your doctor wants you on a fluid restriction. ..it is usually recommended a fluid limit of 2,000cc -  Fluid restriction- 2,000 ml (milliliters) = 64 ounces = you can have 8 glasses of fluid per day (each glass 8 ounces)    Follow a low salt diet - avoid using salt at the table, avoid / limit use of canned soups, processed / packaged foods, salted snacks, olives and pickles.   Do not use a salt substitute without checking with your doctor, they may contain a high amount of potassioum. (Mrs. Cameron Lowe is safe to use). Limit the use of alcohol       CALL YOUR DOCTOR THE FIRST DAY YOU NOTICE ANY OF THESE   SYMPTOMS:  You have a weight gain of 3 pounds or more in 1 day         OR 5 pounds or more in one week  More shortness of breath  More swelling of your stomach, legs, ankles or feet  Feeling more tired, No energy  Dry hacky cough  Dizziness  More chest pain / discomfort       (CALL 911 IF ANY OF THE FOLLOWING OCCURS  Chest pain (not relieved with nitroglycerine, if you have been prescribed this medication)  Severe shortness of breath  Faint / Pass out  Confusion / cannot think clearly  If symptoms get worse           SMOKING - TOBACCO USE:  * IF YOU SMOKE - STOP! Kick the habit. [address] Program is offered at [place] [address] and [address] [facility]. Call [phone] [facility] for more information. ACTIVITY:   (Ask your doctor when you will be able to return to work and before starting any exercise program.  Do not drive unless unless your doctor has given you permission to do so). Start light exercise. Even if you can only do a small amount, exercise will help you get stronger, have more energy, help manage your weight and decrease  stress. Walking is an easy way to get exercise. Start out slowly and  increase the amount you walk as tolerated  If you become short of breath, dizzy or have chest pain; stop, sit down, and rest.  If you feel \"wiped out\" the day after you exercise, walk at a slower pace or for a shorter distance. You can gradually increase the pace or amount of time.             (Do not exercise right after a meal or in extreme temperatures, such as above 85 degrees, if the air is really humid, or wind chill is less than 20 degrees)                                             ADDITIONAL INFORMATION:  Avoid getting sick from colds and the flu. Stay away from friends or family that you know may have a contagious illness  Get plenty of rest   Get a flu shot each year. Get a pneumococcal vaccine shot. If you have had one before, ask your doctor whether you need another dose. My Goal for Self-management of Heart Failure Includes 5 steps :    1. Notice a change in symptoms ( weight gain, short of breath, leg swelling, decreased activity level, bloating. ...)    2. Evaluate the change: (use the Heart Failure Zones )     3. Decide to take action: decide what your options are, such as: (call your doctor for an extra visit, take a prescribed medication, such as your water pill if your doctor has given you directions to do so, [address])    4. Come up with a strategy:  (now you call the doctor for advice / appointment. This is where you take action!!! Do not wait, catch the symptom early and treat it before it worsens. 5. Evaluate the response: The next day, check your Heart Failure Zones: are you in the GREEN ZONE (safe zone)? Worsening symptoms of YELLOW ZONE? Or have you moved to the RED ZONE and need to call 911 or go to the Emergency Room for evaluation? Call your doctor's office to update them on your symptoms of heart failure. Learning About Heart Failure Zones  What are heart failure zones? Heart failure zones give you an easy way to see changes in your heart failure symptoms. They also tell you when you need to get help. Check every day to see which zone you are in. Green zone. You are doing well. This is where you want to be. Your weight is stable. It's not going up or down. You breathe easily. You are sleeping well. You are able to lie flat without shortness of breath. You can do your usual activities. Yellow zone. Be careful. Your symptoms are changing. Call your doctor. You have new or increased shortness of breath. You are dizzy or lightheaded, or you feel like you may faint.   You have sudden weight gain, such as more than 2 to 3 pounds in a day or 5 pounds in a week. (Your doctor may suggest a different range of weight gain.)  You have increased swelling in your legs, ankles, or feet. You are so tired or weak that you can't do your usual activities. You are not sleeping well. Shortness of breath wakes you up at night. You need extra pillows. Red zone. 911  This is an emergency. Call . You have symptoms of sudden heart failure. For example: You have severe trouble breathing. You cough up pink, foamy mucus. You have a new irregular or fast heartbeat. You have symptoms of a heart attack. These may include:  Chest pain or pressure, or a strange feeling in the chest.  Sweating. Shortness of breath. Nausea or vomiting. Pain, pressure, or a strange feeling in the back, neck, jaw, or upper belly or in one or both shoulders or arms. Lightheadedness or sudden weakness. A fast or irregular heartbeat. If you have symptoms of a heart attack 911 : After you call , the  may tell you to chew 1 adult-strength or 2 to 4 low-dose aspirin. Wait for an ambulance. Do not try to drive yourself. Follow-up care is a key part of your treatment and safety. Be sure to make and go to all appointments, and call your doctor if you are having problems. It's also a good idea to know your test results and keep a list of the medicines you take. Where can you learn more? Go to [email or web address] and sign in to your [email or web address] account. Enter T174 in the [facility] box to learn more about \"Learning About Heart Failure Zones. \"     If you do not have an account, please click on the \"Sign Up Now\" link. Current as of: August 31, 2020               Content Version: 12.9  © 7874-1890 [employer]. Care instructions adapted under license by [facility] ([facility]).  If you have questions about a medical condition or this instruction, always ask your healthcare professional. [employer] disclaims any warranty or liability for your use of this information.

## 2022-03-03 NOTE — CONSULTS
[facility]) Pain Management  IP CONSULT NOTE       Patient: Thao Woodard  : 1990  Date of Admission: 3/2/2022 12:41 PM  Date of Service: 3/3/2022  Reason for Consult: Uncontrolled pain        HISTORY OF PRESENT ILLNESS:  This is a 36year old female, well know to our service from previous admissions, who was admitted on 2022 for hyperglycemia prior to an EGD (588). She was supposed to have the EGD for pyloric dilation and botox for gastroparesis. Patient c/o generalized all over body pain which she reports is not any different than prior to hospitalization. She c/o right knee in which an x-ray was ordered from her primary service as well as abdominal pain (seen by GI). Patient reports that the lyrica that she is taking for her all over body pain is ineffective and also causes \"fogginess. \"  States that it was also ineffective at lower doses. She reports that she has failed gabapentin in the past due to ineffectiveness. She describes her pain as \"aching. \"  She denies any other current complaints.       Past Medical History:       Diagnosis Date    Acute congestive heart failure ([address]     BC (acute kidney injury) ([address] 10/01/2019    Cardiac arrest ([address] 02/15/2021    Cephalgia 10/09/2019    Chronic kidney disease     Depression     Diabetes mellitus ([address]     Diabetic gastroparesis associated with type 1 diabetes mellitus ([address] 2018    Diabetic ketoacidosis ([address] 2011    Diabetic ketoacidosis with coma associated with type 1 diabetes mellitus ([address] 2013    Diabetic polyneuropathy associated with type 1 diabetes mellitus ([address] 2020    Drug use complicating pregnancy in third trimester     Endocarditis 10/31/2020    ESRD (end stage renal disease) ([address] 2020    H/O cardiovascular stress test 2021    Lexiscan    Hemodialysis patient [facility])     History of blood transfusion 2019    Hyperlipidemia 10/08/2020    Hyperosmolar hyperglycemic state (HHS) ([address] 2020    Hypothyroidism 10/08/2020    Iron deficiency anemia 10/01/2019    MDRO (multiple drug resistant organisms) resistance     MRSA (methicillin resistant Staphylococcus aureus)     back wound abcess    Non compliance w medication regimen 2016    Other disorders of kidney and ureter     Pregnancy 2016    16 weeks    Previous  delivery affecting pregnancy, antepartum 2017    Previous stillbirth or demise, antepartum 2016    Seizure ([address] 2020    Severe pre-eclampsia in third trimester 2016    Shock liver 02/15/2021    Valvular endocarditis 11/10/2020    This Diagnosis was added to the Problem List based on transcribed orders from Dr. Asha Esquivel           Past Surgical History:       Procedure Laterality Date    BACK SURGERY      abscess    CATHETER REMOVAL N/A 10/1/2021    PERITONEAL CATHETER REMOVAL performed by Reena Bolivar MD at [address] [facility]      x2    [facility], LAPAROSCOPIC N/A 2019    CHOLECYSTECTOMY LAPAROSCOPIC performed by Kim Mcfarlane MD at [address] 2018    COLONOSCOPY WITH BIOPSY performed by Velma Saab MD at [facility] N/A 2018    COLONOSCOPY WITH BIOPSY performed by Jax Galarza MD at . [facility]  2012    EF 57%    ECHO COMPL W DOP COLOR FLOW  6/10/2013         EMBOLECTOMY N/A 2020    ANGIOVAC, VEGECTOMY, YULISSA -- REQS ROOM 3 performed by Graciela Severino MD at [address] [place] [address] Left 2021    LEFT LEG INCISION AND DRAINAGE, DEBRIDEMENT, WOUND VAC APPLICATION performed by Domo Rawls DPM at [address] [place] [address] Left 2021    LEFT LEG DEBRIDEMENT BIOPSY POSS APPLICATION WOUND VAC performed by Domo Rawls DPM at [address] N/A 2020    LAPAROSCOPIC INSERTION PERITONEAL DIALYSIS CATHETER performed by Lyric Samuels MD at [facility]  ESOPHAGOGASTRODUODENOSCOPY TRANSORAL DIAGNOSTIC N/A 5/30/2018    EGD ESOPHAGOGASTRODUODENOSCOPY performed by Tony Pak MD at [place] [address]    TRANSESOPHAGEAL ECHOCARDIOGRAM N/A 10/19/2020    TRANSESOPHAGEAL ECHOCARDIOGRAM WITH BUBBLE STUDY performed by Donnell Bañuelos MD at [address] [place] [address]  04/2018    UPPER GASTROINTESTINAL ENDOSCOPY  12/18/2018    EGD BIOPSY performed by Mony Mcnair MD at [facility] N/A 10/11/2019    EGD ESOPHAGOGASTRODUODENOSCOPY performed by Anusha Ochoa DO at [facility] N/A 7/14/2021    EGD BIOPSY performed by Sd Swenson MD at [facility]:  Allergies   Allergen Reactions    Cefepime Other (See Comments)     \" neurological side effect- slurred speech and confusion    Toradol [Ketorolac Tromethamine] Anaphylaxis and Hives     LABS:   Recent Results (from the past 72 hour(s))   POCT Glucose    Collection Time: 03/02/22  1:11 PM   Result Value Ref Range    Meter Glucose >500 (H) 74 - 99 mg/dL   Basic Metabolic Panel w/ Reflex to MG    Collection Time: 03/02/22  1:20 PM   Result Value Ref Range    Sodium 133 132 - 146 mmol/L    Potassium reflex Magnesium 5.9 (H) 3.5 - 5.0 mmol/L    Chloride 95 (L) 98 - 107 mmol/L    CO2 24 22 - 29 mmol/L    Anion Gap 14 7 - 16 mmol/L    Glucose 588 (HH) 74 - 99 mg/dL    BUN 20 6 - 20 mg/dL    CREATININE 3.2 (H) 0.5 - 1.0 mg/dL    GFR Non-African American 21 >=60 mL/min/1.73    GFR African American 21     Calcium 8.2 (L) 8.6 - 10.2 mg/dL   Pregnancy, HCG qualitative serum    Collection Time: 03/02/22  1:32 PM   Result Value Ref Range    hCG Qual NEGATIVE NEGATIVE   POCT Glucose    Collection Time: 03/02/22  4:31 PM   Result Value Ref Range    Meter Glucose >500 (H) 74 - 99 mg/dL   Beta-Hydroxybutyrate    Collection Time: 03/02/22  6:11 PM   Result Value Ref Range    Beta-Hydroxybutyrate 2.05 (H) 0.02 - 0.27 mmol/L Toxin/Antigen       Indeterminate- see results of C. difficile amplification PCR  Normal Range: Not detected     C. difficile toxin Molecular    Collection Time: 03/02/22  6:55 PM    Specimen: Stool   Result Value Ref Range    Organism Toxigenic C. difficile by DNA amplification (A)     C. difficile toxin Molecular POSITIVE for  CONTACT PRECAUTIONS INDICATED      POCT Glucose    Collection Time: 03/02/22 10:13 PM   Result Value Ref Range    Meter Glucose 97 74 - 99 mg/dL   Basic Metabolic Panel w/ Reflex to MG    Collection Time: 03/03/22  1:22 AM   Result Value Ref Range    Sodium 139 132 - 146 mmol/L    Potassium reflex Magnesium 3.9 3.5 - 5.0 mmol/L    Chloride 103 98 - 107 mmol/L    CO2 27 22 - 29 mmol/L    Anion Gap 9 7 - 16 mmol/L    Glucose 112 (H) 74 - 99 mg/dL    BUN 10 6 - 20 mg/dL    CREATININE 2.1 (H) 0.5 - 1.0 mg/dL    GFR Non-African American 34 >=60 mL/min/1.73    GFR African American 34     Calcium 7.8 (L) 8.6 - 10.2 mg/dL   POCT Glucose    Collection Time: 03/03/22  5:28 AM   Result Value Ref Range    Meter Glucose 178 (H) 74 - 99 mg/dL   CBC with Auto Differential    Collection Time: 03/03/22  7:01 AM   Result Value Ref Range    WBC 4.4 (L) 4.5 - 11.5 E9/L    RBC 2.55 (L) 3.50 - 5.50 E12/L    Hemoglobin 6.9 (LL) 11.5 - 15.5 g/dL    Hematocrit 22.2 (L) 34.0 - 48.0 %    MCV 87.1 80.0 - 99.9 fL    MCH 27.1 26.0 - 35.0 pg    MCHC 31.1 (L) 32.0 - 34.5 %    RDW 17.4 (H) 11.5 - 15.0 fL    Platelets 414 831 - 240 E9/L    MPV 11.1 7.0 - 12.0 fL    Neutrophils % 64.2 43.0 - 80.0 %    Immature Granulocytes % 0.2 0.0 - 5.0 %    Lymphocytes % 24.6 20.0 - 42.0 %    Monocytes % 6.2 2.0 - 12.0 %    Eosinophils % 3.2 0.0 - 6.0 %    Basophils % 1.6 0.0 - 2.0 %    Neutrophils Absolute 2.79 1.80 - 7.30 E9/L    Immature Granulocytes # 0.01 E9/L    Lymphocytes Absolute 1.07 (L) 1.50 - 4.00 E9/L    Monocytes Absolute 0.27 0.10 - 0.95 E9/L    Eosinophils Absolute 0.14 0.05 - 0.50 E9/L    Basophils Absolute 0.07 0.00 - 0.20 K7/S   Basic Metabolic Panel w/ Reflex to MG    Collection Time: 03/03/22  7:01 AM   Result Value Ref Range    Sodium 136 132 - 146 mmol/L    Potassium reflex Magnesium 4.1 3.5 - 5.0 mmol/L    Chloride 102 98 - 107 mmol/L    CO2 26 22 - 29 mmol/L    Anion Gap 8 7 - 16 mmol/L    Glucose 244 (H) 74 - 99 mg/dL    BUN 11 6 - 20 mg/dL    CREATININE 2.4 (H) 0.5 - 1.0 mg/dL    GFR Non-African American 29 >=60 mL/min/1.73    GFR African American 29     Calcium 7.9 (L) 8.6 - 10.2 mg/dL   TYPE AND SCREEN    Collection Time: 03/03/22  8:19 AM   Result Value Ref Range    ABO/Rh O POS     Antibody Screen NEG    PREPARE RBC (CROSSMATCH), 1 Units    Collection Time: 03/03/22  8:19 AM   Result Value Ref Range    Product Code Blood Bank [id number]     Description Blood Bank Red Blood Cells, Leuko-reduced     Unit Number [id number]     Dispense Status Blood Bank issued    POCT Glucose    Collection Time: 03/03/22 11:29 AM   Result Value Ref Range    Meter Glucose 267 (H) 74 - 99 mg/dL       IMAGING: No results found. PHYSICAL EXAMINATION:   General appearance: in no distress with movement in bed, alert and oriented x3 pleasant and cooperative   Build: obese   Function: Laying in bed. HEENT:   Head: normocephalic and atraumatic   Pupils: regular, round and equal.   Sclera: icterus absent  Lungs:   No respiratory distress. Symmetrical expansion. Extremities:   Tremors: No  Intact:Yes   Cyanosis:none   Neurological:   Gait: unable to assess - in bed.   Dermatology:   Skin:no unusual rashes, no skin lesions, no palpable subcutaneous nodules and good skin turgor    Inpatient Medication regimen:   Current Facility-Administered Medications   Medication Dose Route Frequency Provider Last Rate Last Admin    0.9 % sodium chloride infusion   IntraVENous PRN Mari Gonzalez PA-C        acetaminophen (TYLENOL) tablet 650 mg  650 mg Oral See Admin Instructions Mari Gonzalez PA-C        diphenhydrAMINE (BENADRYL) tablet 25 mg  25 mg 75 mcg at 03/03/22 0550    mirtazapine (REMERON) tablet 15 mg  15 mg Oral Nightly Efren Montoya MD   15 mg at 03/02/22 2214    pantoprazole (PROTONIX) tablet 40 mg  40 mg Oral QAM AC Efren Montoya MD   40 mg at 03/03/22 0550    rOPINIRole (REQUIP) tablet 0.25 mg  0.25 mg Oral Nightly Efren Montoya MD   0.25 mg at 03/02/22 2226    promethazine (PHENERGAN) tablet 25 mg  25 mg Oral Q6H PRN Efren oMntoya MD        sennosides-docusate sodium (SENOKOT-S) 8.6-50 MG tablet 2 tablet  2 tablet Oral Nightly PRN Efren Montoya MD        sevelamer (RENVELA) tablet 1,600 mg  1,600 mg Oral TID  Efren Montoya MD   1,600 mg at 03/03/22 0947    insulin glargine (LANTUS) injection vial 1 Units  1 Units SubCUTAneous QA Efren Montoya MD   1 Units at 03/03/22 0949    pregabalin (LYRICA) capsule 50 mg  50 mg Oral TID Efren Montoya MD   50 mg at 03/03/22 0948    insulin lispro (HUMALOG) injection vial 0-6 Units  0-6 Units SubCUTAneous TID  Efren Montoya MD   2 Units at 03/03/22 0940    insulin lispro (HUMALOG) injection vial 0-3 Units  0-3 Units SubCUTAneous Nightly Efren Montoya MD        dextrose bolus (hypoglycemia) 10% 125 mL  125 mL IntraVENous PRN Efren Montoya MD        Or    dextrose bolus (hypoglycemia) 10% 250 mL  250 mL IntraVENous PRN Efren Montoya MD        heparin (porcine) injection 5,000 Units  5,000 Units SubCUTAneous 3 times per day Efren Montoya MD   5,000 Units at 03/02/22 1718    albuterol (PROVENTIL) nebulizer solution 2.5 mg  2.5 mg Nebulization Q4H PRN Crist Michael, MD Arlena Kanner ON 3/4/2022] epoetin nena-epbx (RETACRIT) injection 3,000 Units  3,000 Units SubCUTAneous Once per day on Mon Wed Fri Efern Montoya MD        And    [START ON 3/4/2022] epoetin nena-epbx (RETACRIT) injection 2,000 Units  2,000 Units SubCUTAneous Once per day on Mon Wed Fri Efren Montoya MD        insulin lispro (HUMALOG) injection vial 5 Units  5 Units SubCUTAneous Once Gui Crocker MD           Impression:  Admitted for hyperglycemia prior to EGD for pyloric dilation and botox for gastroparesis. Chronic Abdominal pain   Hx of type 1 DM on HD  Hx of cardiac arrest 2021  Hx of seizure  Previously tried gabapentin with no relief    Current inpatient pain regimen:  Lyrica 50 mg TID - \"makes me feel like a zombie. \"  States ineffective at lower doses. Recommendation for plan of care:  1. Can consider weaning off lyrica as she reports that it causes drowsiness and is ineffective. 2. Prior notes from previous admissions state no narcotics due to gastroparesis. 3. Not a candidate for cymbalta as she is currently on lexapro and abilify  4. Consider TENs unit. PT to instruct on use. 5. Consider referral to psychiatry for CBT (Cognitive Behavioral Therapy). If unable to find locally, may need to refer to larger hospital system such as [facility] [place]. Unfortunately she has failed multiple pain treatment modalities or her conditions or current medications preclude use of other treatments. This case was discussed at length with Dr. Sudheer Hines DO. Thank you for the referral.   MADDIE Machado  [place] [facility]     Case discussed with Everett Mckeon PA-C. Agree with impression and recommendations.     Linda Kern DO

## 2022-03-04 VITALS
DIASTOLIC BLOOD PRESSURE: 97 MMHG | WEIGHT: 129.63 LBS | SYSTOLIC BLOOD PRESSURE: 132 MMHG | OXYGEN SATURATION: 96 % | HEIGHT: 64 IN | HEART RATE: 97 BPM | BODY MASS INDEX: 22.13 KG/M2 | TEMPERATURE: 98.4 F | RESPIRATION RATE: 18 BRPM

## 2022-03-04 PROBLEM — F32.A DEPRESSION: Status: ACTIVE | Noted: 2021-06-22

## 2022-03-04 PROBLEM — R41.841 COGNITIVE COMMUNICATION DISORDER: Status: ACTIVE | Noted: 2022-03-04

## 2022-03-04 LAB
ANION GAP SERPL CALCULATED.3IONS-SCNC: 6 MMOL/L (ref 7–16)
ANION GAP SERPL CALCULATED.3IONS-SCNC: 6 MMOL/L (ref 7–16)
ANION GAP SERPL CALCULATED.3IONS-SCNC: 8 MMOL/L (ref 7–16)
BUN BLDV-MCNC: 12 MG/DL (ref 6–20)
BUN BLDV-MCNC: 7 MG/DL (ref 6–20)
BUN BLDV-MCNC: 9 MG/DL (ref 6–20)
CALCIUM SERPL-MCNC: 7.9 MG/DL (ref 8.6–10.2)
CALCIUM SERPL-MCNC: 8.1 MG/DL (ref 8.6–10.2)
CALCIUM SERPL-MCNC: 8.2 MG/DL (ref 8.6–10.2)
CHLORIDE BLD-SCNC: 104 MMOL/L (ref 98–107)
CHLORIDE BLD-SCNC: 106 MMOL/L (ref 98–107)
CHLORIDE BLD-SCNC: 108 MMOL/L (ref 98–107)
CO2: 24 MMOL/L (ref 22–29)
CO2: 26 MMOL/L (ref 22–29)
CO2: 27 MMOL/L (ref 22–29)
CREAT SERPL-MCNC: 1.6 MG/DL (ref 0.5–1)
CREAT SERPL-MCNC: 1.9 MG/DL (ref 0.5–1)
CREAT SERPL-MCNC: 2.2 MG/DL (ref 0.5–1)
GFR AFRICAN AMERICAN: 32
GFR AFRICAN AMERICAN: 38
GFR AFRICAN AMERICAN: 46
GFR NON-AFRICAN AMERICAN: 32 ML/MIN/1.73
GFR NON-AFRICAN AMERICAN: 38 ML/MIN/1.73
GFR NON-AFRICAN AMERICAN: 46 ML/MIN/1.73
GLUCOSE BLD-MCNC: 227 MG/DL (ref 74–99)
GLUCOSE BLD-MCNC: 297 MG/DL (ref 74–99)
GLUCOSE BLD-MCNC: 352 MG/DL (ref 74–99)
METER GLUCOSE: 394 MG/DL (ref 74–99)
METER GLUCOSE: 449 MG/DL (ref 74–99)
PHOSPHORUS: 1.6 MG/DL (ref 2.5–4.5)
POTASSIUM REFLEX MAGNESIUM: 3.7 MMOL/L (ref 3.5–5)
POTASSIUM REFLEX MAGNESIUM: 4.2 MMOL/L (ref 3.5–5)
POTASSIUM REFLEX MAGNESIUM: 4.5 MMOL/L (ref 3.5–5)
SODIUM BLD-SCNC: 136 MMOL/L (ref 132–146)
SODIUM BLD-SCNC: 138 MMOL/L (ref 132–146)
SODIUM BLD-SCNC: 141 MMOL/L (ref 132–146)

## 2022-03-04 PROCEDURE — 2580000003 HC RX 258: Performed by: STUDENT IN AN ORGANIZED HEALTH CARE EDUCATION/TRAINING PROGRAM

## 2022-03-04 PROCEDURE — 99224 PR SBSQ OBSERVATION CARE/DAY 15 MINUTES: CPT | Performed by: INTERNAL MEDICINE

## 2022-03-04 PROCEDURE — 6360000002 HC RX W HCPCS: Performed by: STUDENT IN AN ORGANIZED HEALTH CARE EDUCATION/TRAINING PROGRAM

## 2022-03-04 PROCEDURE — 99203 OFFICE O/P NEW LOW 30 MIN: CPT

## 2022-03-04 PROCEDURE — G0378 HOSPITAL OBSERVATION PER HR: HCPCS

## 2022-03-04 PROCEDURE — 80048 BASIC METABOLIC PNL TOTAL CA: CPT

## 2022-03-04 PROCEDURE — 96375 TX/PRO/DX INJ NEW DRUG ADDON: CPT

## 2022-03-04 PROCEDURE — 97165 OT EVAL LOW COMPLEX 30 MIN: CPT

## 2022-03-04 PROCEDURE — 2580000003 HC RX 258: Performed by: NURSE PRACTITIONER

## 2022-03-04 PROCEDURE — 84100 ASSAY OF PHOSPHORUS: CPT

## 2022-03-04 PROCEDURE — 2500000003 HC RX 250 WO HCPCS: Performed by: NURSE PRACTITIONER

## 2022-03-04 PROCEDURE — 6370000000 HC RX 637 (ALT 250 FOR IP): Performed by: STUDENT IN AN ORGANIZED HEALTH CARE EDUCATION/TRAINING PROGRAM

## 2022-03-04 PROCEDURE — 82962 GLUCOSE BLOOD TEST: CPT

## 2022-03-04 PROCEDURE — 6370000000 HC RX 637 (ALT 250 FOR IP): Performed by: PHYSICIAN ASSISTANT

## 2022-03-04 PROCEDURE — 36415 COLL VENOUS BLD VENIPUNCTURE: CPT

## 2022-03-04 PROCEDURE — 96366 THER/PROPH/DIAG IV INF ADDON: CPT

## 2022-03-04 PROCEDURE — 96372 THER/PROPH/DIAG INJ SC/IM: CPT

## 2022-03-04 PROCEDURE — 97530 THERAPEUTIC ACTIVITIES: CPT | Performed by: PHYSICAL THERAPIST

## 2022-03-04 PROCEDURE — 97161 PT EVAL LOW COMPLEX 20 MIN: CPT | Performed by: PHYSICAL THERAPIST

## 2022-03-04 PROCEDURE — 96365 THER/PROPH/DIAG IV INF INIT: CPT

## 2022-03-04 PROCEDURE — APPSS60 APP SPLIT SHARED TIME 46-60 MINUTES: Performed by: PHYSICIAN ASSISTANT

## 2022-03-04 RX ORDER — ESCITALOPRAM OXALATE 10 MG/1
10 TABLET ORAL DAILY
Qty: 30 TABLET | Refills: 3 | DISCHARGE
Start: 2022-03-05

## 2022-03-04 RX ORDER — INSULIN GLARGINE 100 [IU]/ML
5 INJECTION, SOLUTION SUBCUTANEOUS EVERY MORNING
Status: DISCONTINUED | OUTPATIENT
Start: 2022-03-04 | End: 2022-03-04 | Stop reason: HOSPADM

## 2022-03-04 RX ORDER — INSULIN GLARGINE 100 [IU]/ML
5 INJECTION, SOLUTION SUBCUTANEOUS EVERY MORNING
Qty: 10 ML | Refills: 3 | Status: ON HOLD | DISCHARGE
Start: 2022-03-05 | End: 2022-04-15 | Stop reason: HOSPADM

## 2022-03-04 RX ORDER — ESCITALOPRAM OXALATE 10 MG/1
10 TABLET ORAL DAILY
Status: DISCONTINUED | OUTPATIENT
Start: 2022-03-05 | End: 2022-03-04 | Stop reason: HOSPADM

## 2022-03-04 RX ADMIN — INSULIN LISPRO 6 UNITS: 100 INJECTION, SOLUTION INTRAVENOUS; SUBCUTANEOUS at 11:45

## 2022-03-04 RX ADMIN — EPOETIN ALFA-EPBX 2000 UNITS: 2000 INJECTION, SOLUTION INTRAVENOUS; SUBCUTANEOUS at 15:05

## 2022-03-04 RX ADMIN — INSULIN GLARGINE 5 UNITS: 100 INJECTION, SOLUTION SUBCUTANEOUS at 08:54

## 2022-03-04 RX ADMIN — DILTIAZEM HYDROCHLORIDE 90 MG: 30 TABLET, FILM COATED ORAL at 13:21

## 2022-03-04 RX ADMIN — PANTOPRAZOLE SODIUM 40 MG: 40 TABLET, DELAYED RELEASE ORAL at 06:20

## 2022-03-04 RX ADMIN — PREGABALIN 50 MG: 50 CAPSULE ORAL at 13:19

## 2022-03-04 RX ADMIN — ONDANSETRON 4 MG: 2 INJECTION INTRAMUSCULAR; INTRAVENOUS at 09:27

## 2022-03-04 RX ADMIN — ACETAMINOPHEN 650 MG: 325 TABLET ORAL at 13:19

## 2022-03-04 RX ADMIN — LEVOTHYROXINE SODIUM 75 MCG: 0.07 TABLET ORAL at 06:20

## 2022-03-04 RX ADMIN — Medication 125 MG: at 11:48

## 2022-03-04 RX ADMIN — Medication 125 MG: at 00:14

## 2022-03-04 RX ADMIN — SEVELAMER CARBONATE 1600 MG: 800 TABLET, FILM COATED ORAL at 08:50

## 2022-03-04 RX ADMIN — EPOETIN ALFA-EPBX 3000 UNITS: 3000 INJECTION, SOLUTION INTRAVENOUS; SUBCUTANEOUS at 15:05

## 2022-03-04 RX ADMIN — HYDROXYZINE HYDROCHLORIDE 25 MG: 25 TABLET ORAL at 00:14

## 2022-03-04 RX ADMIN — Medication 125 MG: at 06:20

## 2022-03-04 RX ADMIN — CHOLESTYRAMINE 4 G: 4 POWDER, FOR SUSPENSION ORAL at 08:51

## 2022-03-04 RX ADMIN — PREGABALIN 50 MG: 50 CAPSULE ORAL at 08:50

## 2022-03-04 RX ADMIN — ESCITALOPRAM 5 MG: 10 TABLET, FILM COATED ORAL at 08:50

## 2022-03-04 RX ADMIN — DILTIAZEM HYDROCHLORIDE 90 MG: 30 TABLET, FILM COATED ORAL at 08:51

## 2022-03-04 RX ADMIN — SODIUM PHOSPHATE, MONOBASIC, MONOHYDRATE 20 MMOL: 276; 142 INJECTION, SOLUTION INTRAVENOUS at 10:38

## 2022-03-04 RX ADMIN — INSULIN LISPRO 5 UNITS: 100 INJECTION, SOLUTION INTRAVENOUS; SUBCUTANEOUS at 08:55

## 2022-03-04 RX ADMIN — HYDROXYZINE HYDROCHLORIDE 25 MG: 25 TABLET ORAL at 13:19

## 2022-03-04 RX ADMIN — Medication 10 ML: at 09:00

## 2022-03-04 ASSESSMENT — PAIN SCALES - GENERAL
PAINLEVEL_OUTOF10: 0
PAINLEVEL_OUTOF10: 0
PAINLEVEL_OUTOF10: 8

## 2022-03-04 NOTE — PROGRESS NOTES
Physical Therapy Initial Evaluation/Plan of Care    Room #:  0331/0331-01  Patient Name: Luz Ortiz  YOB: 1990  MRN: [id number]    Date of Service: 3/4/2022     Tentative placement recommendation: Subacute rehab  Equipment recommendation: Jeffrey Sheth      Evaluating Physical Therapist: Alie Franz, PT #[id number]      Specific Provider Orders/Date/Referring Provider :  03/03/22 1715   PT eval and treat Start: 03/03/22 1715, End: 03/03/22 1715, ONE TIME         Admitting Diagnosis:   Hyperglycemia [R73.9]     hyperglycemia prior to an EGD     Surgery: none      Patient Active Problem List   Diagnosis    Non compliance w medication regimen    Tobacco smoking complicating pregnancy    MTHFR mutation    Generalized abdominal pain    Diabetic ketoacidosis without coma associated with type 1 diabetes mellitus ([address]    Hypertension    Prolonged QT interval    Iron deficiency anemia    Sinus tachycardia    Bladder dysfunction    Clostridium difficile infection    Hypokalemia    Severe protein-calorie malnutrition ([address]    Uncontrolled type 1 diabetes mellitus with hyperglycemia ([address]    End stage renal disease ([address]    Hypothyroidism    Hyperlipidemia    Acute cystitis    Nondisplaced fracture of neck of fifth metacarpal bone, left hand, initial encounter for closed fracture    Chronic right-sided low back pain    Endocarditis    Seizure ([address]    Hyperkalemia    Hypomagnesemia    Hypocalcemia    Intractable nausea and vomiting    Wound of left leg, sequela    Syncope    Type 1 diabetes mellitus without complication (HCC)    Hyperosmolality    Depression    Lactic acid acidosis    Early satiety    Acute on chronic systolic CHF (congestive heart failure) (HCC)    Other chest pain    Anemia due to chronic kidney disease, on chronic dialysis ([address]    Septic shock ([address]    ESRD (end stage renal disease) ([address]    Hyperosmolar hyperglycemic state (HHS) ([address]    Hypertensive urgency    Nausea    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) [facility]    ESRD (end stage renal disease) on dialysis ([facility]    AMS (altered mental status)    Opioid overdose ([facility]    Anemia    Type 1 diabetes mellitus with chronic kidney disease on chronic dialysis (HCC)    Elevated TSH    Diabetic acidosis without coma ([facility]    DKA, type 1, not at goal [facility])    COVID-19 virus infection    Frequent hospital admissions    Diabetic gastroparesis ([facility]    Diffuse pain    Poor prognosis    Physical deconditioning    Declining functional status    Lack of motivation    Drug-seeking behavior    Behavior problem, adult    Volume overload    Hyperglycemia    Cognitive communication disorder        ASSESSMENT of Current Deficits Patient exhibits decreased strength, balance, endurance and pain global impairing functional mobility, transfers, gait , gait distance and tolerance to activity slow dianne, shuffling steps, fatigues quickly limiting gait distance. PHYSICAL THERAPY  PLAN OF CARE       Physical therapy plan of care is established based on physician order,  patient diagnosis and clinical assessment    Current Treatment Recommendations:    -Bed Mobility: Lower extremity exercises   -Sitting Balance: Facilitate active trunk muscle engagement , Facilitate postural control in all planes  and Engage in core activities to allow for movement within base of support   -Standing Balance: Perform strengthening exercises in standing to promote motor control with or without upper extremity support   -Transfers: Provide instruction on proper hand and foot position for adequate transfer of weight onto lower extremities and use of gait device if needed and Cues for hand placement, technique and safety.  Provide stabilization to prevent fall   -Gait: Gait training, Standing activities to improve: base of support, weight shift, weight bearing  and Pregait training to emphasize: Sequencing , increased Tish, Step through gait pattern, Device control, Upright and Safety   -Endurance: Utilize Supervised activities to increase level of endurance to allow for safe functional mobility including transfers and gait     PT long term treatment goals are located in below grid    Patient and or family understand(s) diagnosis, prognosis, and plan of care. Frequency of treatments: Patient will be seen  daily.          Prior Level of Function: Patient ambulated with standard walker short distance at rehab with wheelchair follow and assist  Rehab Potential: good    for baseline    Past medical history:   Past Medical History:   Diagnosis Date    Acute congestive heart failure ([address]     BC (acute kidney injury) ([address] 10/01/2019    Cardiac arrest ([address] 02/15/2021    Cephalgia 10/09/2019    Chronic kidney disease     Depression     Diabetes mellitus ([address]     Diabetic gastroparesis associated with type 1 diabetes mellitus ([address] 2018    Diabetic ketoacidosis ([address] 2011    Diabetic ketoacidosis with coma associated with type 1 diabetes mellitus ([address] 2013    Diabetic polyneuropathy associated with type 1 diabetes mellitus ([address] 2020    Drug use complicating pregnancy in third trimester     Endocarditis 10/31/2020    ESRD (end stage renal disease) ([address] 2020    H/O cardiovascular stress test 2021    Lexiscan    Hemod[facility])     History of blood transfusion 2019    Hyperlipidemia 10/08/2020    Hyperosmolar hyperglycemic state (HHS) ([address] 2020    Hypothyroidism 10/08/2020    Iron deficiency anemia 10/01/2019    MDRO (multiple drug resistant organisms) resistance     MRSA (methicillin resistant Staphylococcus aureus)     back wound abcess    Non compliance w medication regimen 2016    Other disorders of kidney and ureter     Pregnancy 2016    16 weeks    Previous  delivery affecting pregnancy, antepartum 2017    Previous would benefit from continued skilled Physical Therapy to improve functional independence and quality of life. Patient's/ family goals   rehab    Time in  0830  Time out  0910    Total Treatment Time  20 minutes    Evaluation time includes thorough review of current medical information, gathering information on past medical history/social history and prior level of function, completion of standardized testing/informal observation of tasks, assessment of data, and development of Plan of care and goals.      CPT codes:  Low Complexity PT evaluation (47917)  Therapeutic activities (22587)   10 minutes  1 unit(s)  Therapeutic exercises (68907)   10 minutes  0 unit(s)    Teena Mccoy PT

## 2022-03-04 NOTE — CARE COORDINATION
CM note: Met with patient and she does wish to return to [facility]. however because she was not discharged prior to the 23 hour una of observation she would need to have a new precert. [facility] but PT/OT evals must be on the chart. PT/OT saw patient this AM, liaison notified, awaiting determination if patient able to return skilled.

## 2022-03-04 NOTE — CARE COORDINATION
3/4/2022 0708 CM note: Received call from Courtney Sandy on 3rd floor on 3/3/22 at 46 stating Dr wants to discharge pt back to SNF. CM spoke with Anthony Rowell,regarding above and that pt is now>23 hr observation. Per Sergio Brothers, pt will need to have skilled need to return to facility. PRECERT is waived but pt needs to be skilled. CM informed MADDIE Schofield of above and need for PT/OT. Will await PT/OT evals. Unit CM will be updated. Courtney Sandy, 3rd floor RN informed of above. (Pt with +c diff).  Mia ALVAREZ

## 2022-03-04 NOTE — CARE COORDINATION
CM note: [facility] has received pre-cert, transportation arranged for 4:30pm with [facility] for a wheelchair trip. Patient aware and facility liaison also notified. Patient states that she will inform her family. No covid testing needed to return.

## 2022-03-04 NOTE — PROGRESS NOTES
Patient's discharge was organized yesterday back to the SNF however requirements were that she be reassessed by physical therapy which she was and today was deemed a continued candidate for physical therapy with good potential.  Patient is tolerating her vancomycin orally for C. difficile infection well and I explained her that she would have 3 to 5 days of significant diarrhea and then will start to taper off and to not miss any doses and ensure that she does take her vancomycin orally 4 times daily. Patient will continue hemodialysis at the facility due for tomorrow and did receive 1 unit of packed red blood cells with dialysis 3/3/2022. Her hemoglobin post transfusion was 9.1. Patient had evaluation with psychiatry to make sure we optimize her depression medication as she cannot have her pain medication advanced due to severe gastroparesis and recommended that her Lexapro be increase to 10 mg which I done on her discharge medication reconciliation. Patient is agreeable to transfer to the SNF, to continue physical therapy, to continue hemodialysis 3 times weekly and to continue her medications as previously for anxiety, pain, hypertension and her chronic medical problems.   All questions were answered

## 2022-03-04 NOTE — PROGRESS NOTES
[address] [facility]  [facility]        WYDF:                                                  Patient Name: Seng Felix    MRN: [id number]    : 1990    Room: 0330331-01      Evaluating OT: Raymon Echeverria OTR/L #[id number]     Referring Provider and Specific Provider Orders/Date:      22  OT eval and treat  Start:  22,   End:  22,   ONE TIME,   Standing Count:  1 Occurrences,   R         Gaudencio Galan PA-C        Placement Recommendation: Subacute Rehab       Diagnosis:   No diagnosis found.        Surgery: None      Pertinent Medical History:       Past Medical History:   Diagnosis Date    Acute congestive heart failure ([address]     BC (acute kidney injury) ([address] 10/01/2019    Cardiac arrest ([address] 02/15/2021    Cephalgia 10/09/2019    Chronic kidney disease     Depression     Diabetes mellitus ([address]     Diabetic gastroparesis associated with type 1 diabetes mellitus ([address] 2018    Diabetic ketoacidosis ([address] 2011    Diabetic ketoacidosis with coma associated with type 1 diabetes mellitus ([address] 2013    Diabetic polyneuropathy associated with type 1 diabetes mellitus ([address] 2020    Drug use complicating pregnancy in third trimester     Endocarditis 10/31/2020    ESRD (end stage renal disease) ([address] 2020    H/O cardiovascular stress test 2021    Lexiscan    Hemodialysis patient [facility])     History of blood transfusion 2019    Hyperlipidemia 10/08/2020    Hyperosmolar hyperglycemic state (HHS) ([address] 2020    Hypothyroidism 10/08/2020    Iron deficiency anemia 10/01/2019    MDRO (multiple drug resistant organisms) resistance     MRSA (methicillin resistant Staphylococcus aureus)     back wound abcess    Non compliance w medication regimen 2016    Other disorders of kidney and ureter     Pregnancy 2016    16 weeks    Previous  delivery affecting pregnancy, antepartum 2017    Previous stillbirth or demise, antepartum 2016    Seizure ([address] 2020    Severe pre-eclampsia in third trimester 2016    Shock liver 02/15/2021    Valvular endocarditis 11/10/2020    This Diagnosis was added to the Problem List based on transcribed orders from Dr. Brad Curtis            Past Surgical History:   Procedure Laterality Date    BACK SURGERY      abscess    CATHETER REMOVAL N/A 10/1/2021    PERITONEAL CATHETER REMOVAL performed by Trevor Byers MD at [facility]      x2   [facility], LAPAROSCOPIC N/A 2019    CHOLECYSTECTOMY LAPAROSCOPIC performed by Deanne Rios MD at [address] [place] [address] N/A 2018    COLONOSCOPY WITH BIOPSY performed by Maday Osullivan MD at [address] [facility] N/A 2018    COLONOSCOPY WITH BIOPSY performed by Marly Diehl MD at [address] [facility]  2012    EF 57%    ECHO COMPL W DOP COLOR FLOW  6/10/2013         EMBOLECTOMY N/A 2020    [address] [place] [address], [address] [place] [address], YUL[place]SA -- REQS ROOM 3 performed by Damita Habermann, MD at [address] [place] [address] Left 2021    LEFT LEG INCISION AND DRAINAGE, DEBRIDEMENT, WOUND VAC APPLICATION performed by Darlene Irene DPM at [address] [place] [address] Left 2021    LEFT LEG DEBRIDEMENT BIOPSY POSS APPLICATION WOUND VAC performed by Darlene Irene DPM at [place] [address] N/A 2020    LAPAROSCOPIC INSERTION PERITONEAL DIALYSIS CATHETER performed by Andrew Cedeño MD at [facility] ESOPHAGOGASTRODUODENOSCOPY TRANSORAL DIAGNOSTIC N/A 2018    EGD ESOPHAGOGASTRODUODENOSCOPY performed by Maday Osullivan MD at [address] [place] [address] TRANSESOPHAGEAL ECHOCARDIOGRAM N/A 10/19/2020    TRANSESOPHAGEAL ECHOCARDIOGRAM WITH BUBBLE STUDY performed by Jen Han MD at [address] [place] [address] TUNNELED VENOUS PORT PLACEMENT  04/2018    UPPER GASTROINTESTINAL ENDOSCOPY  12/18/2018    EGD BIOPSY performed by Shaina Fletcher MD at [address] [facility] 10/11/2019    EGD ESOPHAGOGASTRODUODENOSCOPY performed by Tom Meade DO at [address] [facility] N/A 7/14/2021    EGD BIOPSY performed by Donna Craig MD at [facility] ENDOSCOPY        Precautions:  Fall Risk, contact isolation, dialysis patient      Assessment of current deficits    [x] Functional mobility  [x]ADLs  [x] Strength               []Cognition    [x] Functional transfers   [x] IADLs         [x] Safety Awareness   [x]Endurance    [] Fine Coordination              [x] Balance      [] Vision/perception   []Sensation     []Gross Motor Coordination  [] ROM  [] Delirium                   [] Motor Control     OT PLAN OF CARE   OT POC based on physician orders, patient diagnosis and results of clinical assessment    Frequency/Duration 1-3 days/wk for 2 weeks PRN     Specific OT Treatment Interventions to include:   * Instruction/training on adapted ADL techniques and AE recommendations to increase functional independence within precautions       * Training on energy conservation strategies, correct breathing pattern and techniques to improve independence/tolerance for self-care routine  * Functional transfer/mobility training/DME recommendations for increased independence, safety, and fall prevention  * Patient/Family education to increase follow through with safety techniques and functional independence  * Recommendation of environmental modifications for increased safety with functional transfers/mobility and ADLs  * Therapeutic exercise to improve motor endurance, ROM, and functional strength for ADLs/functional transfers  * Therapeutic activities to facilitate/challenge dynamic balance, stand tolerance for increased safety and independence with ADLs    Recommended Adaptive Equipment: TBD Home Living: Pt arrived from rehab at SNF       Prior Level of Function: Pt reports being mostly independent with ADLs at rehab but has assistance if needed. Was ambulating with wheeled walker short distances with assist and wheelchair follow. Pain Level: Pt denied pain, just nausea; Nursing notified. Cognition: A&O: 4/4; Follows 2-3 step directions   Memory: intact   Sequencing: fair    Problem solving: fair    Judgement/safety: fair     [facility]   AM-PAC Daily Activity Inpatient   How much help for putting on and taking off regular lower body clothing?: A Little  How much help for Bathing?: A Lot  How much help for Toileting?: A Lot  How much help for putting on and taking off regular upper body clothing?: A Little  How much help for taking care of personal grooming?: A Little  How much help for eating meals?: A Little  AM-[facility] Inpatient Daily Activity Raw Score: 16  AM-PAC Inpatient ADL T-Scale Score : 35.96  ADL Inpatient CMS 0-100% Score: 53.32  ADL Inpatient CMS G-Code Modifier : CK     Functional Assessment:    Initial Eval Status  Date: 3/4/22   Treatment Status  Date: STGs = LTGs  Time frame: 10-14 days   Feeding Supervision     Independent    Grooming Supervision/Set-up  With oral hygiene while seated at table-top    Moderate [place]    UB Dressing Minimal Assist    Supervision   LB Dressing Minimal Assist   Pt threaded LEs through briefs while seated via cross-leg technique. Time/effort needed. Min A to don briefs over hips upon standing supported at walker. Supervision    Bathing Moderate Assist     Supervision    Toileting Dependent   For rear hygiene/thoroughness d/t loose bowels     Supervision    Bed Mobility  Supine to sit: N/T   Sit to supine: N/T    Supine to sit: Supervision   Sit to supine: Supervision    Functional Transfers Sit to stand: Minimal Assist   Stand to sit: Minimal Assist     Transfer training with verbal cues for hand placement to improve safety.      Supervision with use of wheeled walker   Functional Mobility N/T      Supervision with use of wheeled walker   Balance Sitting:     Static: supervision/indep in chair    Dynamic: supervision/indep in chair   Standing: CGA/Min A with wheeled walker    Sitting:     Static: indep    Dynamic: indep  Standing: mod I with walker    Activity Tolerance fair  minus; limited by nausea and overall weakness. Standing tolerance <2 min. Increase standing tolerance >2-3 minutes for improved engagement with functional transfers and indep in ADLs     Visual/  Perceptual Glasses: N/a     NA      Hand Dominance:      AROM (PROM) Strength Additional Info:    RUE  WFL 4-/5 grossly  good  and wfl FMC/dexterity noted during ADL tasks     LUE WFL 4-/5 grossly  good  and wfl FMC/dexterity noted during ADL tasks       Hearing:  WFL   Sensation:   No c/o numbness or tingling  Tone:  WFL   Edema: None    Comments: RN cleared patient for OT. Upon arrival patient in chair. Therapist facilitated and instructed pt on adapted  techniques & compensatory strategies to improve safety and independence with basic ADLs, bed mobility, functional transfers and mobility to allow pt to achieve highest level of independence and safely. Pt demonstrated fair understanding of education & follow through. At end of session, patient was in chair with call light and phone within reach, all lines and tubes intact. Overall, patient demonstrated  decreased independence and safety during completion of ADL tasks. Pt would benefit from continued skilled OT to increase safety and independence with completion of ADL tasks and functional mobility for improved quality of life. Rehab Potential: Good for established goals. Patient / Family Goal: Not stated       Patient and/or family were instructed on functional diagnosis, prognosis/goals and OT plan of care. Demonstrated fair understanding.      Eval Complexity: Low    Time In: 9:25 AM   Time Out: 9:45 AM Total Treatment Time: 0       Min Units   OT Eval Low 97165  X  1    OT Eval Medium 87650      OT Eval High 39707      OT Re-Eval [id number]            ADL/Self Care 16644     Therapeutic Activities 49188       Therapeutic Ex 49556       Orthotic Management 97528       Manual [id number]     Neuro Re-Ed 21491       Non-Billable Time        Evaluation Time additionally includes thorough review of current medical information, gathering information on past medical history/social history and prior level of function, interpretation of standardized testing/informal observation of tasks, assessment of data and development of plan of care and goals.         Evaluating OT: Frankie Owens OTR/L #[id number]

## 2022-03-04 NOTE — CONSULTS
PSYCHIATRY ATTENDING CONSULT    REASON FOR CONSULT:  Depression    REQUESTING PHYSICIAN:  Dr. Mabel Miranda: \"My anxiety is worse than my depression. \"    HISTORY OF PRESENT ILLNESS:  James Gama  is a 36 y.o. female who was admitted on 3/2/22 due to hyperglycemia. Patient presented for an EGD with pylorid dilatation and Botox injection for diabetic gastroparesis and was found to have a glucose greater than 500. Procedure was canceled and patient was admitted. Patient is on hemodialysis 3 times a week. Chart reviewed. Note home psychotropics of Lyrica 50 mg daily, Lexapro 5 mg daily, Remeron 15 mg at bedtime and Abilify 5 mg at bedtime. Patient currently lives at [address] [place] [address]. Psychiatry consulted for depression. On assessment, patient sitting in chair with a depressed mood, sad affect and tearful at times. Alert and oriented x4. Patient rates her anxiety a 10 out of 10 and her depression is 7 out of 10. She states that her anxiety gets worse when things do not go her way and being away from her children. She states that the father's children  in July and that made her depression worse. She also admits she is not sleeping well. Patient denies suicidal or homicidal ideations. No voiced delusions or hallucinations. Patient is not manic or psychotic. PAST PSYCHIATRIC HISTORY:  Treats at [facility] for her medications in the past. Currently residing in Nursing home.  No inpatient psychiatric treatment and no suicidal attempts in her past.    PAST MEDICAL HISTORY:       Diagnosis Date    Acute congestive heart failure ([address]     BC (acute kidney injury) ([address] 10/01/2019    Cardiac arrest ([address] 02/15/2021    Cephalgia 10/09/2019    Chronic kidney disease     Depression     Diabetes mellitus ([address]     Diabetic gastroparesis associated with type 1 diabetes mellitus ([address] 2018    Diabetic ketoacidosis ([address] 2011    Diabetic ketoacidosis with coma associated with type 1 diabetes mellitus ([facility] 2013    Diabetic polyneuropathy associated with type 1 diabetes mellitus ([facility] 2020    Drug use complicating pregnancy in third trimester     Endocarditis 10/31/2020    ESRD (end stage renal disease) ([facility] 2020    H/O cardiovascular stress test 2021    Lexiscan    Hemodialysis patient [facility])     History of blood transfusion 2019    Hyperlipidemia 10/08/2020    Hyperosmolar hyperglycemic state (HHS) ([facility] 2020    Hypothyroidism 10/08/2020    Iron deficiency anemia 10/01/2019    MDRO (multiple drug resistant organisms) resistance     MRSA (methicillin resistant Staphylococcus aureus)     back wound abcess    Non compliance w medication regimen 2016    Other disorders of kidney and ureter     Pregnancy 2016    16 weeks    Previous  delivery affecting pregnancy, antepartum 2017    Previous stillbirth or demise, antepartum 2016    Seizure ([facility] 2020    Severe pre-eclampsia in third trimester 2016    Shock liver 02/15/2021    Valvular endocarditis 11/10/2020    This Diagnosis was added to the Problem List based on transcribed orders from Dr. Noreen Latif: General - negative, neurological - negative, ENT - negative, CV - negative, pulmonary - negative, GI - negative, dermatologic - negative, rheumatologic - negative, musculoskeletal - negative,  - negative, hematologic - negative    PAST SURGICAL HISTORY:       Procedure Laterality Date    BACK SURGERY      abscess    CATHETER REMOVAL N/A 10/1/2021    PERITONEAL CATHETER REMOVAL performed by Alia Vinson MD at [facility]      x2    CHOLECYSTECTOMY, LAPAROSCOPIC N/A 2019    CHOLECYSTECTOMY LAPAROSCOPIC performed by Raisa Olivo MD at [facility] N/A 2018    COLONOSCOPY WITH BIOPSY performed by Camilla Long MD at [facility] N/A 2018    COLONOSCOPY WITH BIOPSY performed by Jimenez Whiteside MD at [address]  1/31/2012    EF 57%    ECHO COMPL W DOP COLOR FLOW  6/10/2013         EMBOLECTOMY N/A 11/6/2020    [address] [facility] [address], [address] [facility], [place] -- REQS ROOM 3 performed by Jose Eduardo Graf MD at [address] [place] [address] Left 2/23/2021    LEFT LEG INCISION AND DRAINAGE, DEBRIDEMENT, WOUND VAC APPLICATION performed by Didi Silva DPM at [address] [place] [address] Left 5/18/2021    LEFT LEG DEBRIDEMENT BIOPSY POSS APPLICATION WOUND VAC performed by Didi Silva DPM at [address] N/A 6/26/2020    LAPAROSCOPIC INSERTION PERITONEAL DIALYSIS CATHETER performed by Steffanie Mendiola MD at [facility] [address] ESOPHAGOGASTRODUODENOSCOPY TRANSORAL DIAGNOSTIC N/A 5/30/2018    EGD ESOPHAGOGASTRODUODENOSCOPY performed by Pardeep Trinh MD at [place] [address]    TRANSESOPHAGEAL ECHOCARDIOGRAM N/A 10/19/2020    TRANSESOPHAGEAL ECHOCARDIOGRAM WITH BUBBLE STUDY performed by Mg Rosenbaum MD at [address] [place] [address]  04/2018    UPPER GASTROINTESTINAL ENDOSCOPY  12/18/2018    EGD BIOPSY performed by Angus Padgett MD at [address] [place] [address] 10/11/2019    EGD ESOPHAGOGASTRODUODENOSCOPY performed by Georgette Moser DO at [address] [place] [address] N/A 7/14/2021    EGD BIOPSY performed by Carly Martini MD at [address] [place] [address]: Current Facility-Administered Medications: insulin glargine (LANTUS) injection vial 5 Units, 5 Units, SubCUTAneous, QAM  sodium phosphate 20 mmol in dextrose 5 % 500 mL IVPB, 20 mmol, IntraVENous, Once  0.9 % sodium chloride infusion, , IntraVENous, PRN  diphenhydrAMINE (BENADRYL) tablet 25 mg, 25 mg, Oral, See Admin Instructions  vancomycin (VANCOCIN) oral solution 125 mg, 125 mg, Oral, 4 times per day  0.9 % sodium chloride infusion, 25 mL, IntraVENous, PRN  0.9 % sodium chloride infusion, , IntraVENous, Continuous  sodium chloride flush 0.9 % injection 5-40 mL, 5-40 mL, IntraVENous, 2 times per day  sodium chloride flush 0.9 % injection 5-40 mL, 5-40 mL, IntraVENous, PRN  0.9 % sodium chloride infusion, 25 mL, IntraVENous, PRN  ondansetron (ZOFRAN-ODT) disintegrating tablet 4 mg, 4 mg, Oral, Q8H PRN **OR** ondansetron (ZOFRAN) injection 4 mg, 4 mg, IntraVENous, Q6H PRN  polyethylene glycol (GLYCOLAX) packet 17 g, 17 g, Oral, Daily PRN  acetaminophen (TYLENOL) tablet 650 mg, 650 mg, Oral, Q6H PRN **OR** acetaminophen (TYLENOL) suppository 650 mg, 650 mg, Rectal, Q6H PRN  glucose (GLUTOSE) 40 % oral gel 15 g, 15 g, Oral, PRN  glucagon (rDNA) injection 1 mg, 1 mg, IntraMUSCular, PRN  dextrose 5 % solution, 100 mL/hr, IntraVENous, PRN  ARIPiprazole (ABILIFY) tablet 5 mg, 5 mg, Oral, Nightly  cholestyramine (QUESTRAN) packet 4 g, 1 packet, Oral, BID  dilTIAZem (CARDIZEM) tablet 90 mg, 90 mg, Oral, 4x Daily  docusate sodium (COLACE) capsule 100 mg, 100 mg, Oral, Daily PRN  escitalopram (LEXAPRO) tablet 5 mg, 5 mg, Oral, Daily  hydrOXYzine (ATARAX) tablet 25 mg, 25 mg, Oral, Q8H PRN  levothyroxine (SYNTHROID) tablet 75 mcg, 75 mcg, Oral, Daily  mirtazapine (REMERON) tablet 15 mg, 15 mg, Oral, Nightly  pantoprazole (PROTONIX) tablet 40 mg, 40 mg, Oral, QAM AC  rOPINIRole (REQUIP) tablet 0.25 mg, 0.25 mg, Oral, Nightly  promethazine (PHENERGAN) tablet 25 mg, 25 mg, Oral, Q6H PRN  sennosides-docusate sodium (SENOKOT-S) 8.6-50 MG tablet 2 tablet, 2 tablet, Oral, Nightly PRN  pregabalin (LYRICA) capsule 50 mg, 50 mg, Oral, TID  insulin lispro (HUMALOG) injection vial 0-6 Units, 0-6 Units, SubCUTAneous, TID WC  insulin lispro (HUMALOG) injection vial 0-3 Units, 0-3 Units, SubCUTAneous, Nightly  dextrose bolus (hypoglycemia) 10% 125 mL, 125 mL, IntraVENous, PRN **OR** dextrose bolus (hypoglycemia) 10% 250 mL, 250 mL, IntraVENous, PRN  heparin (porcine) injection 5,000 Units, 5,000 Units, SubCUTAneous, 3 times per day  albuterol (PROVENTIL) nebulizer solution 2.5 mg, 2.5 mg, Nebulization, Q4H PRN  epoetin nena-epbx (RETACRIT) injection 3,000 Units, 3,000 Units, SubCUTAneous, Once per day on Mon Wed Fri **AND** epoetin nena-epbx (RETACRIT) injection 2,000 Units, 2,000 Units, SubCUTAneous, Once per day on Mon Wed Fri  insulin lispro (HUMALOG) injection vial 5 Units, 5 Units, SubCUTAneous, Once    ALLERGIES:  Cefepime and Toradol [ketorolac tromethamine]    FAMILY PSYCHIATRIC HISTORY: Denies any family psychiatric history. SOCIAL HISTORY: Currently living at [facility]. Has 2 children ages 3 and 11. SUBSTANCE ABUSE HISTORY:  reports that she quit smoking about 13 months ago. Her smoking use included cigarettes. She has a 3.00 pack-year smoking history. She has never used smokeless tobacco. She reports previous drug use. Drug: Opiates . She reports that she does not drink alcohol. VITALS:   Vitals:    03/04/22 0600   BP: (!) 132/97   Pulse: 97   Resp: 18   Temp: 98.4 °F (36.9 °C)   SpO2: 96%       LABS:CBC:  Recent Labs     03/02/22  1811 03/03/22  0701 03/03/22  1653   WBC 6.1 4.4*  --    RBC 2.59* 2.55*  --    HGB 7.4* 6.9* 9.1*   HCT 24.0* 22.2* 28.7*   MCV 92.7 87.1  --    RDW 18.2* 17.4*  --     229  --    CHEMISTRIES:  Recent Labs     03/02/22  1811 03/03/22  0122 03/03/22  1431 03/03/22  1431 03/03/22  2023 03/04/22  0104 03/04/22  0531      < > 138   < > 142 138 141   K 3.5   < > 3.5   < > 4.4 4.2 4.5   CL 98   < > 105   < > 109* 106 108*   CO2 24   < > 29   < > 25 26 27   BUN 22*   < > 4*   < > 7 7 9   CREATININE 3.4*   < > 1.2*   < > 1.4* 1.6* 1.9*   GLUCOSE 486*   < > 156*   < > 140* 227* 352*   PHOS  --   --   --   --   --   --  1.6*   MG 2.0  --  1.8  --   --   --   --     < > = values in this interval not displayed. PT/INR:No results for input(s): PROTIME, INR in the last 72 hours. APTT:No results for input(s): APTT in the last 72 hours. LIVER PROFILE:No results for input(s): AST, ALT, BILIDIR, BILITOT, ALKPHOS in the last 72 hours.      MENTAL STATUS EXAMINATION  Female appears older than age. Pleasant, cooperative. Decreased psychomotor activity, strength, tone, eye contact. Gait not assessed. Mood depressed. Affect blunted. Speech clear. Thoughts organized. Content future-oriented. No suicidal or homicidal ideations. No paranoia, delusions or hallucinations. Orientation, concentration, recent and remote memory are grossly intact. Fund of knowledge fair. Language use fair. Insight and judgment fair. ASSESSMENT:  MDD recurrent moderate     Anxiety Disorder unspecified    PLAN & RECOMMENDATIONS: Patient states she has been depressed for many years. Currently she is on a low dose of Lexapro so I will increase to Lexapro 10 mg which will help with both anxiety and depression. No indication for psychiatric admission. OK to discharge when medically stable and follow up with nursing home psychiatrist. Psychiatry will sign off. Time spent 35 min.       HEBER Appiah CNP 3/4/2022 10:05 AM

## 2022-03-04 NOTE — CARE COORDINATION
MICHEL note: Per [facility] liaison, they must submit and wait for pre-cert as patient's therapy evals did not qualify her for automatic approval with insurance provider (waiver).

## 2022-03-04 NOTE — PLAN OF CARE
Discharge orders noted. Follow-up in the office in 1 to 2 weeks after discharge -earlier if indicated. Patient to call for appointment and with questions/concerns at [phone]. Thank you.     Jean Shukla MD

## 2022-03-04 NOTE — PROGRESS NOTES
Department of Internal Medicine  Nephrology Progress Note      Events reviewed. SUBJECTIVE: We are following Miss Nohemi Soni for ESRD on HD. She is sitting up to chair at the time of my examination. Reports abdominal pain.     PHYSICAL EXAM:      Vitals:    VITALS:  BP (!) 132/97   Pulse 97   Temp 98.4 °F (36.9 °C) (Oral)   Resp 18   Ht 5' 4\" (1.626 m)   Wt 129 lb 10.1 oz (58.8 kg)   SpO2 96%   BMI 22.25 kg/m²   24HR INTAKE/OUTPUT:      Intake/Output Summary (Last 24 hours) at 3/4/2022 3450  Last data filed at 3/4/2022 0600  Gross per 24 hour   Intake 1800 ml   Output 1152 ml   Net 648 ml       Access: RIJ tunneled dialysis catheter  Constitutional:  Lethargic, but wakens to voice  HEENT:  PERRL, normocephalic, atraumatic  Respiratory:  CTA bilateral   Cardiovascular/Edema:  ST, RRR, no murmur gallop or rub  Gastrointestinal:  abd distended, c/o persistent abdominal pain  Neurologic:  Lethargic, awakens to voice, follows commands, no focal deficit  Skin:  Warm, dry, ecchymotic areas to abdomen  Other:    Scheduled Meds:   insulin glargine  5 Units SubCUTAneous QAM    sodium phosphate IVPB  20 mmol IntraVENous Once    diphenhydrAMINE  25 mg Oral See Admin Instructions    vancomycin  125 mg Oral 4 times per day    sodium chloride flush  5-40 mL IntraVENous 2 times per day    ARIPiprazole  5 mg Oral Nightly    cholestyramine  1 packet Oral BID    dilTIAZem  90 mg Oral 4x Daily    escitalopram  5 mg Oral Daily    levothyroxine  75 mcg Oral Daily    mirtazapine  15 mg Oral Nightly    pantoprazole  40 mg Oral QAM AC    rOPINIRole  0.25 mg Oral Nightly    pregabalin  50 mg Oral TID    insulin lispro  0-6 Units SubCUTAneous TID WC    insulin lispro  0-3 Units SubCUTAneous Nightly    heparin (porcine)  5,000 Units SubCUTAneous 3 times per day    epoetin nena-epbx  3,000 Units SubCUTAneous Once per day on Mon Wed Fri    And    epoetin nena-epbx  2,000 Units SubCUTAneous Once per day on Mon Wed Fri  insulin lispro  5 Units SubCUTAneous Once     Continuous Infusions:   sodium chloride      sodium chloride      sodium chloride 10 mL/hr at 03/02/22 1352    sodium chloride      dextrose       PRN Meds:.sodium chloride, sodium chloride, sodium chloride flush, sodium chloride, ondansetron **OR** ondansetron, polyethylene glycol, acetaminophen **OR** acetaminophen, glucose, glucagon (rDNA), dextrose, docusate sodium, hydrOXYzine, promethazine, sennosides-docusate sodium, dextrose bolus (hypoglycemia) **OR** dextrose bolus (hypoglycemia), albuterol    DATA:    CBC:   Lab Results   Component Value Date    WBC 4.4 03/03/2022    RBC 2.55 03/03/2022    HGB 9.1 03/03/2022    HCT 28.7 03/03/2022    MCV 87.1 03/03/2022    MCH 27.1 03/03/2022    MCHC 31.1 03/03/2022    RDW 17.4 03/03/2022     03/03/2022    MPV 11.1 03/03/2022     CMP:    Lab Results   Component Value Date     03/04/2022    K 4.5 03/04/2022     03/04/2022    CO2 27 03/04/2022    BUN 9 03/04/2022    CREATININE 1.9 03/04/2022    GFRAA 38 03/04/2022    LABGLOM 38 03/04/2022    GLUCOSE 352 03/04/2022    GLUCOSE 130 05/18/2012    PROT 6.2 02/01/2022    LABALBU 2.7 02/01/2022    LABALBU 4.1 05/18/2012    CALCIUM 8.2 03/04/2022    BILITOT 0.4 02/01/2022    ALKPHOS 241 02/01/2022    AST 15 02/01/2022    ALT 11 02/01/2022     Magnesium:    Lab Results   Component Value Date    MG 1.8 03/03/2022     Phosphorus:    Lab Results   Component Value Date    PHOS 1.6 03/04/2022     Radiology Review:        BRIEF SUMMARY OF INITIAL CONSULT:    Briefly, Miss Mickie Cockayne is a 36year-old female with a PMH of ESRD on hemodialysis 3 days/wk, on TTS schedule at [facility], Type I DM, poorly controlled with recurrent admissions for DKA, HTN, gastroparesis, ascites, infective endocarditis, cardiac arrest, hypothyroidism and depression who was admitted on March 2, 2022 after she presented for an elective EGD with pyloric dilatation and Botox injection for diabetic gastroparesis, however upon presentation her glucose was greater than 500 and procedure was canceled patient was admitted for further evaluation. Labs on admission were significant for sodium of 133, potassium 5.9, chloride 95, bicarb 24, BUN 20, creatinine 3.2 mg/dl, and blood glucose greater than 500. We are consulted for dialysis management. Problems resolved. · Hyperkalemia, secondary to decreased GFR from ESRD. Resolved. IMPRESSION/RECOMMENDATIONS:      1. ESRD 3 times a week TTS via RIJ tunneled dialysis catheter. HD initiated September 2021 after failed peritoneal dialysis with persistent hyperkalemia. For dialysis three times/wk TTS while inpatient. For dialysis tomorrow.      2. HTN, on lopressor and amlodipine at home  3. Vitamin D deficiency, on ergocalciferol at home  4. MBD of CKD, on sevelamer (hold until phosphorus level obtained)  5. Anemia of CKD, Hgb 6.9 mg/dL today, continue epoetin alpha 3,000 unit 3 times/wk. for transfusion  6. Type I DM, poorly controlled with frequent readmissions for DKA, blood glucose on admission greater than 500  ------------------------------------------------------  9. Recent Covid 19 infection, unvaccinated  8. Restless leg syndrome, on ropinirole at home  11. Depression, on escitalopram and Abilify  12. Hypothyroidism, on levothyroxine   13. History of gastroparesis, on metoclopramide. For EGD and pyloric dilatation with Botox injection inpatient versus outpatient per GI.  14. C. difficile, on oral vancomycin  15.  Nutrition, low potassium diet    Plan:    · Continue HD TTS while inpatient  · Continue epoetin alpha 3,000 units 3 times/wk  · Stop Sevelamer  · Give sodium phosphate 20 mmol  · Monitor phosphorus levels  · Obtain ionized calcium in am  · Monitor labs daily  · Monitor glucose levels, better control  · Monitor H&H, transfuse <7      Electronically signed by HEBER Thornton CNP on 3/4/2022 at 8:52 AM

## 2022-03-04 NOTE — PROGRESS NOTES
[address] [place], [place] [address]ist   Progress Note    Admitting Date and Time: 3/2/2022 12:41 PM  Admit Dx: Hyperglycemia [R73.9]    Subjective:    She is anxious for going home understands that her diarrhea is going to get better in the near future and that she does qualify for SNF to continue her physical therapy as well as her hemodialysis.     Per RN: Nothing new to report    ROS: Cannot be obtained as patient would not cooperate with questions     insulin glargine  5 Units SubCUTAneous QAM    [START ON 3/5/2022] escitalopram  10 mg Oral Daily    diphenhydrAMINE  25 mg Oral See Admin Instructions    vancomycin  125 mg Oral 4 times per day    sodium chloride flush  5-40 mL IntraVENous 2 times per day    ARIPiprazole  5 mg Oral Nightly    cholestyramine  1 packet Oral BID    dilTIAZem  90 mg Oral 4x Daily    levothyroxine  75 mcg Oral Daily    mirtazapine  15 mg Oral Nightly    pantoprazole  40 mg Oral QAM AC    rOPINIRole  0.25 mg Oral Nightly    pregabalin  50 mg Oral TID    insulin lispro  0-6 Units SubCUTAneous TID WC    insulin lispro  0-3 Units SubCUTAneous Nightly    heparin (porcine)  5,000 Units SubCUTAneous 3 times per day    epoetin nena-epbx  3,000 Units SubCUTAneous Once per day on Mon Wed Fri    And    epoetin nena-epbx  2,000 Units SubCUTAneous Once per day on Mon Wed Fri    insulin lispro  5 Units SubCUTAneous Once     sodium chloride, , PRN  sodium chloride flush, 5-40 mL, PRN  sodium chloride, 25 mL, PRN  ondansetron, 4 mg, Q8H PRN   Or  ondansetron, 4 mg, Q6H PRN  polyethylene glycol, 17 g, Daily PRN  acetaminophen, 650 mg, Q6H PRN   Or  acetaminophen, 650 mg, Q6H PRN  glucose, 15 g, PRN  glucagon (rDNA), 1 mg, PRN  dextrose, 100 mL/hr, PRN  docusate sodium, 100 mg, Daily PRN  hydrOXYzine, 25 mg, Q8H PRN  promethazine, 25 mg, Q6H PRN  sennosides-docusate sodium, 2 tablet, Nightly PRN  dextrose bolus (hypoglycemia), 125 mL, PRN   Or  dextrose bolus (hypoglycemia), 250 mL, PRN  albuterol, 2.5 mg, Q4H PRN     Objective:    BP (!) 132/97   Pulse 97   Temp 98.4 °F (36.9 °C) (Oral)   Resp 18   Ht 5' 4\" (1.626 m)   Wt 129 lb 10.1 oz (58.8 kg)   SpO2 96%   BMI 22.25 kg/m²   General Appearance: alert and oriented to person, place and time and in no acute distress  Skin: warm and dry  Head: normocephalic and atraumatic  Neck: neck supple and non tender without mass   Pulmonary/Chest: clear to auscultation bilaterally- no wheezes, rales or rhonchi, normal air movement, no respiratory distress  Cardiovascular: normal rate, normal S1 and S2 and no carotid bruits  Abdomen: soft, non-tender, non-distended, normal bowel sounds, no masses or organomegaly  Extremities: no cyanosis, no clubbing and no edema  Neurologic: no cranial nerve deficit and speech normal    Recent Labs     03/03/22  2023 03/04/22  0104 03/04/22  0531    138 141   K 4.4 4.2 4.5   * 106 108*   CO2 25 26 27   BUN 7 7 9   CREATININE 1.4* 1.6* 1.9*   GLUCOSE 140* 227* 352*   CALCIUM 8.5* 8.1* 8.2*       No results for input(s): ALKPHOS, PROT, LABALBU, BILITOT, AST, ALT in the last 72 hours. Recent Labs     03/02/22  1811 03/03/22  0701 03/03/22  1653   WBC 6.1 4.4*  --    RBC 2.59* 2.55*  --    HGB 7.4* 6.9* 9.1*   HCT 24.0* 22.2* 28.7*   MCV 92.7 87.1  --    MCH 28.6 27.1  --    MCHC 30.8* 31.1*  --    RDW 18.2* 17.4*  --     229  --    MPV 11.7 11.1  --      Radiology:   XR KNEE RIGHT (3 VIEWS)   Final Result   1. Lateral subluxation of the patella relative to the trochlear groove. Soft tissue edema in the vicinity of the medial patellar retinaculum with a   large suprapatellar joint effusion. (Suspect medial patellar retinaculum injury.)      2. No additional osseous abnormality seen about the right knee. RECOMMENDATION:   Suggest screening right knee MRI.            Assessment:  Principal Problem:    Hyperglycemia  Active Problems:    Clostridium difficile infection    Depression Anemia due to chronic kidney disease, on chronic dialysis [facility])    Cognitive communication disorder  Resolved Problems:    * No resolved hospital problems. *    PLAN:     1. Hyperglycemia  -Glucose 244 today  -Continue home lantus and adjust as necessary     2. Chronic generalized pain   -On lyrica at home, will continue  -Avoid opiates as patient has been seen by pain management previously and recommended against narcotics unless clear indication  -Patient requesting pain medication on initial interview, stated she does not like lyrica  -Will consult pain management again for consideration of different nonopiate pain regimen without lyrica     3. End stage renal disease on hemodialysis   -nephrology consulted for HD management   -Continue sevelamer     4. Type I diabetes mellitus  -continue basal insulin with low dose corrective scale  -hypoglycemia orders in place      5. Sinus tachycardia  -continue diltiazem-hold mornings of dialysis   -HR 95 on admission     6. Anemia of chronic kidney disease  -continue Retacrit  -Hemoglobin 6.9 transfused 1 unit with dialysis 3/3, awaiting 1 hour post H&H     7. Depression  -continue Lexapro and Abilify      8. Diabetic gastroparesis  -Was supposed to have outpatient EGD for pyloric dilation and botox injection today but cancelled for hyperglycemia  -Patient now with C. difficile infection will have patient see gastroenterology as an outpatient to schedule Botox injections     9. R knee pain  -Patient reported she fell on her right leg a couple days ago and her knee has been hurting since.  Denies hitting her head or LOC  -R knee is TTP  -Xray     Code Status: Full code  DVT prophylaxis: Heparin  Disposition: Patient's discharge was organized yesterday back to the SNF however requirements were that she be reassessed by physical therapy which she was and today was deemed a continued candidate for physical therapy with good potential.  Patient is tolerating her vancomycin orally for C. difficile infection well and I explained her that she would have 3 to 5 days of significant diarrhea and then will start to taper off and to not miss any doses and ensure that she does take her vancomycin orally 4 times daily. Patient will continue hemodialysis at the facility due for tomorrow and did receive 1 unit of packed red blood cells with dialysis 3/3/2022. Her hemoglobin post transfusion was 9.1. Patient had evaluation with psychiatry to make sure we optimize her depression medication as she cannot have her pain medication advanced due to severe gastroparesis and recommended that her Lexapro be increase to 10 mg which I done on her discharge medication reconciliation. Patient is agreeable to transfer to the SNF, to continue physical therapy, to continue hemodialysis 3 times weekly and to continue her medications as previously for anxiety, pain, hypertension and her chronic medical problems. All questions were answered      NOTE: This report was transcribed using voice recognition software. Every effort was made to ensure accuracy; however, inadvertent computerized transcription errors may be present.      Electronically signed by Radha Butler, Ph.D. on 3/4/2022 at 3:07 PM

## 2022-03-27 ENCOUNTER — APPOINTMENT (OUTPATIENT)
Dept: GENERAL RADIOLOGY | Age: 32
DRG: 420 | End: 2022-03-27
Payer: COMMERCIAL

## 2022-03-27 ENCOUNTER — APPOINTMENT (OUTPATIENT)
Dept: CT IMAGING | Age: 32
DRG: 420 | End: 2022-03-27
Payer: COMMERCIAL

## 2022-03-27 ENCOUNTER — HOSPITAL ENCOUNTER (INPATIENT)
Age: 32
LOS: 29 days | Discharge: HOME HEALTH | DRG: 420 | End: 2022-04-25
Attending: EMERGENCY MEDICINE | Admitting: INTERNAL MEDICINE
Payer: COMMERCIAL

## 2022-03-27 DIAGNOSIS — R41.82 ALTERED MENTAL STATUS, UNSPECIFIED ALTERED MENTAL STATUS TYPE: Primary | ICD-10-CM

## 2022-03-27 DIAGNOSIS — Z99.2 ANEMIA DUE TO CHRONIC KIDNEY DISEASE, ON CHRONIC DIALYSIS (HCC): ICD-10-CM

## 2022-03-27 DIAGNOSIS — E10.65 UNCONTROLLED TYPE 1 DIABETES MELLITUS WITH HYPERGLYCEMIA (HCC): ICD-10-CM

## 2022-03-27 DIAGNOSIS — R73.9 HYPERGLYCEMIA: ICD-10-CM

## 2022-03-27 DIAGNOSIS — E87.5 HYPERKALEMIA: ICD-10-CM

## 2022-03-27 DIAGNOSIS — N18.6 ANEMIA DUE TO CHRONIC KIDNEY DISEASE, ON CHRONIC DIALYSIS (HCC): ICD-10-CM

## 2022-03-27 DIAGNOSIS — E87.8 REFEEDING SYNDROME: ICD-10-CM

## 2022-03-27 DIAGNOSIS — D63.1 ANEMIA DUE TO CHRONIC KIDNEY DISEASE, ON CHRONIC DIALYSIS (HCC): ICD-10-CM

## 2022-03-27 DIAGNOSIS — E83.42 HYPOMAGNESEMIA: ICD-10-CM

## 2022-03-27 DIAGNOSIS — E55.9 VITAMIN D DEFICIENCY: ICD-10-CM

## 2022-03-27 LAB
AADO2: 223.5 MMHG
AADO2: 606.9 MMHG
ABO/RH: NORMAL
ACETAMINOPHEN LEVEL: <5 MCG/ML (ref 10–30)
ALBUMIN SERPL-MCNC: 2.5 G/DL (ref 3.5–5.2)
ALP BLD-CCNC: 234 U/L (ref 35–104)
ALT SERPL-CCNC: 20 U/L (ref 0–32)
AMMONIA: 19 UMOL/L (ref 11–51)
ANION GAP SERPL CALCULATED.3IONS-SCNC: 10 MMOL/L (ref 7–16)
ANTIBODY SCREEN: NORMAL
APTT: 34.1 SEC (ref 24.5–35.1)
AST SERPL-CCNC: 33 U/L (ref 0–31)
B.E.: -0.5 MMOL/L (ref -3–3)
B.E.: -1.3 MMOL/L (ref -3–3)
BACTERIA: ABNORMAL /HPF
BASOPHILS ABSOLUTE: 0.05 E9/L (ref 0–0.2)
BASOPHILS RELATIVE PERCENT: 0.4 % (ref 0–2)
BETA-HYDROXYBUTYRATE: 0.1 MMOL/L (ref 0.02–0.27)
BILIRUB SERPL-MCNC: 0.4 MG/DL (ref 0–1.2)
BILIRUBIN URINE: NEGATIVE
BLOOD BANK DISPENSE STATUS: NORMAL
BLOOD BANK PRODUCT CODE: NORMAL
BLOOD, URINE: ABNORMAL
BPU ID: NORMAL
BUN BLDV-MCNC: 38 MG/DL (ref 6–20)
CALCIUM SERPL-MCNC: 8.7 MG/DL (ref 8.6–10.2)
CHLORIDE BLD-SCNC: 100 MMOL/L (ref 98–107)
CLARITY: CLEAR
CO2: 25 MMOL/L (ref 22–29)
COHB: 0.5 % (ref 0–1.5)
COHB: 0.8 % (ref 0–1.5)
COLOR: YELLOW
COMMENT: ABNORMAL
CREAT SERPL-MCNC: 4.9 MG/DL (ref 0.5–1)
CRITICAL: ABNORMAL
CRITICAL: ABNORMAL
DATE ANALYZED: ABNORMAL
DATE ANALYZED: ABNORMAL
DATE OF COLLECTION: ABNORMAL
DATE OF COLLECTION: ABNORMAL
DESCRIPTION BLOOD BANK: NORMAL
EOSINOPHILS ABSOLUTE: 0.08 E9/L (ref 0.05–0.5)
EOSINOPHILS RELATIVE PERCENT: 0.6 % (ref 0–6)
ETHANOL: <10 MG/DL (ref 0–0.08)
FIO2: 100 %
FIO2: 50 %
GFR AFRICAN AMERICAN: 13
GFR NON-AFRICAN AMERICAN: 13 ML/MIN/1.73
GLUCOSE BLD-MCNC: 163 MG/DL (ref 74–99)
GLUCOSE URINE: 250 MG/DL
HCG(URINE) PREGNANCY TEST: NEGATIVE
HCG, URINE, POC: NEGATIVE
HCO3: 20.2 MMOL/L (ref 22–26)
HCO3: 23.9 MMOL/L (ref 22–26)
HCT VFR BLD CALC: 20.3 % (ref 34–48)
HEMOGLOBIN: 6.5 G/DL (ref 11.5–15.5)
HHB: 24.2 % (ref 0–5)
HHB: 3.2 % (ref 0–5)
IMMATURE GRANULOCYTES #: 0.06 E9/L
IMMATURE GRANULOCYTES %: 0.5 % (ref 0–5)
INR BLD: 1.2
KETONES, URINE: NEGATIVE MG/DL
LAB: ABNORMAL
LAB: ABNORMAL
LACTIC ACID, SEPSIS: 1.5 MMOL/L (ref 0.5–1.9)
LEUKOCYTE ESTERASE, URINE: ABNORMAL
LYMPHOCYTES ABSOLUTE: 1.28 E9/L (ref 1.5–4)
LYMPHOCYTES RELATIVE PERCENT: 10.3 % (ref 20–42)
Lab: ABNORMAL
Lab: ABNORMAL
Lab: NORMAL
MCH RBC QN AUTO: 26.3 PG (ref 26–35)
MCHC RBC AUTO-ENTMCNC: 32 % (ref 32–34.5)
MCV RBC AUTO: 82.2 FL (ref 80–99.9)
METER GLUCOSE: 159 MG/DL (ref 74–99)
METER GLUCOSE: 178 MG/DL (ref 74–99)
METHB: 0.2 % (ref 0–1.5)
METHB: 0.3 % (ref 0–1.5)
MODE: ABNORMAL
MODE: AC
MONOCYTES ABSOLUTE: 0.36 E9/L (ref 0.1–0.95)
MONOCYTES RELATIVE PERCENT: 2.9 % (ref 2–12)
NEGATIVE QC PASS/FAIL: NORMAL
NEUTROPHILS ABSOLUTE: 10.54 E9/L (ref 1.8–7.3)
NEUTROPHILS RELATIVE PERCENT: 85.3 % (ref 43–80)
NITRITE, URINE: NEGATIVE
O2 SATURATION: 75.5 % (ref 92–98.5)
O2 SATURATION: 96.8 % (ref 92–98.5)
O2HB: 74.7 % (ref 94–97)
O2HB: 96.1 % (ref 94–97)
OPERATOR ID: 1868
OPERATOR ID: 2577
PATIENT TEMP: 37 C
PATIENT TEMP: 37 C
PCO2: 22.4 MMHG (ref 35–45)
PCO2: 37.8 MMHG (ref 35–45)
PDW BLD-RTO: 18.2 FL (ref 11.5–15)
PEEP/CPAP: 8 CMH2O
PEEP/CPAP: 8 CMH2O
PFO2: 0.43 MMHG/%
PFO2: 1.91 MMHG/%
PH BLOOD GAS: 7.42 (ref 7.35–7.45)
PH BLOOD GAS: 7.57 (ref 7.35–7.45)
PH UA: 8 (ref 5–9)
PLATELET # BLD: 277 E9/L (ref 130–450)
PMV BLD AUTO: 9.9 FL (ref 7–12)
PO2: 43.3 MMHG (ref 75–100)
PO2: 95.3 MMHG (ref 75–100)
POSITIVE QC PASS/FAIL: NORMAL
POTASSIUM SERPL-SCNC: 5 MMOL/L (ref 3.5–5)
PRO-BNP: ABNORMAL PG/ML (ref 0–125)
PROTEIN UA: >=300 MG/DL
PROTHROMBIN TIME: 12.8 SEC (ref 9.3–12.4)
RBC # BLD: 2.47 E12/L (ref 3.5–5.5)
RBC UA: ABNORMAL /HPF (ref 0–2)
RI(T): 14.02
RI(T): 2.35
RR MECHANICAL: 18 B/MIN
RR MECHANICAL: 18 B/MIN
SALICYLATE, SERUM: <0.3 MG/DL (ref 0–30)
SODIUM BLD-SCNC: 135 MMOL/L (ref 132–146)
SOURCE, BLOOD GAS: ABNORMAL
SOURCE, BLOOD GAS: ABNORMAL
SPECIFIC GRAVITY UA: 1.02 (ref 1–1.03)
THB: 7.4 G/DL (ref 11.5–16.5)
THB: 7.8 G/DL (ref 11.5–16.5)
TIME ANALYZED: 1920
TIME ANALYZED: 2147
TOTAL PROTEIN: 6.7 G/DL (ref 6.4–8.3)
TRICYCLIC ANTIDEPRESSANTS SCREEN SERUM: NEGATIVE NG/ML
TROPONIN, HIGH SENSITIVITY: 206 NG/L (ref 0–9)
UROBILINOGEN, URINE: 0.2 E.U./DL
VT MECHANICAL: 400 ML
VT MECHANICAL: 450 ML
WBC # BLD: 12.4 E9/L (ref 4.5–11.5)
WBC UA: >20 /HPF (ref 0–5)

## 2022-03-27 PROCEDURE — 80307 DRUG TEST PRSMV CHEM ANLYZR: CPT

## 2022-03-27 PROCEDURE — 80143 DRUG ASSAY ACETAMINOPHEN: CPT

## 2022-03-27 PROCEDURE — 85025 COMPLETE CBC W/AUTO DIFF WBC: CPT

## 2022-03-27 PROCEDURE — 74176 CT ABD & PELVIS W/O CONTRAST: CPT

## 2022-03-27 PROCEDURE — 96365 THER/PROPH/DIAG IV INF INIT: CPT

## 2022-03-27 PROCEDURE — 82010 KETONE BODYS QUAN: CPT

## 2022-03-27 PROCEDURE — 74018 RADEX ABDOMEN 1 VIEW: CPT

## 2022-03-27 PROCEDURE — 71045 X-RAY EXAM CHEST 1 VIEW: CPT

## 2022-03-27 PROCEDURE — 94002 VENT MGMT INPAT INIT DAY: CPT

## 2022-03-27 PROCEDURE — 81001 URINALYSIS AUTO W/SCOPE: CPT

## 2022-03-27 PROCEDURE — 6360000002 HC RX W HCPCS

## 2022-03-27 PROCEDURE — 87088 URINE BACTERIA CULTURE: CPT

## 2022-03-27 PROCEDURE — 80179 DRUG ASSAY SALICYLATE: CPT

## 2022-03-27 PROCEDURE — 0BH18EZ INSERTION OF ENDOTRACHEAL AIRWAY INTO TRACHEA, VIA NATURAL OR ARTIFICIAL OPENING ENDOSCOPIC: ICD-10-PCS | Performed by: GENERAL PRACTICE

## 2022-03-27 PROCEDURE — 81025 URINE PREGNANCY TEST: CPT

## 2022-03-27 PROCEDURE — 85730 THROMBOPLASTIN TIME PARTIAL: CPT

## 2022-03-27 PROCEDURE — 85610 PROTHROMBIN TIME: CPT

## 2022-03-27 PROCEDURE — 84484 ASSAY OF TROPONIN QUANT: CPT

## 2022-03-27 PROCEDURE — P9016 RBC LEUKOCYTES REDUCED: HCPCS

## 2022-03-27 PROCEDURE — 86850 RBC ANTIBODY SCREEN: CPT

## 2022-03-27 PROCEDURE — 31500 INSERT EMERGENCY AIRWAY: CPT

## 2022-03-27 PROCEDURE — 70450 CT HEAD/BRAIN W/O DYE: CPT

## 2022-03-27 PROCEDURE — 02HV33Z INSERTION OF INFUSION DEVICE INTO SUPERIOR VENA CAVA, PERCUTANEOUS APPROACH: ICD-10-PCS | Performed by: GENERAL PRACTICE

## 2022-03-27 PROCEDURE — 87040 BLOOD CULTURE FOR BACTERIA: CPT

## 2022-03-27 PROCEDURE — 82077 ASSAY SPEC XCP UR&BREATH IA: CPT

## 2022-03-27 PROCEDURE — 82962 GLUCOSE BLOOD TEST: CPT

## 2022-03-27 PROCEDURE — 2580000003 HC RX 258: Performed by: EMERGENCY MEDICINE

## 2022-03-27 PROCEDURE — 36556 INSERT NON-TUNNEL CV CATH: CPT

## 2022-03-27 PROCEDURE — 2500000003 HC RX 250 WO HCPCS: Performed by: GENERAL PRACTICE

## 2022-03-27 PROCEDURE — 36415 COLL VENOUS BLD VENIPUNCTURE: CPT

## 2022-03-27 PROCEDURE — 51702 INSERT TEMP BLADDER CATH: CPT

## 2022-03-27 PROCEDURE — 80053 COMPREHEN METABOLIC PANEL: CPT

## 2022-03-27 PROCEDURE — 93005 ELECTROCARDIOGRAM TRACING: CPT | Performed by: EMERGENCY MEDICINE

## 2022-03-27 PROCEDURE — 86900 BLOOD TYPING SEROLOGIC ABO: CPT

## 2022-03-27 PROCEDURE — 6360000002 HC RX W HCPCS: Performed by: GENERAL PRACTICE

## 2022-03-27 PROCEDURE — 86923 COMPATIBILITY TEST ELECTRIC: CPT

## 2022-03-27 PROCEDURE — 82140 ASSAY OF AMMONIA: CPT

## 2022-03-27 PROCEDURE — 99285 EMERGENCY DEPT VISIT HI MDM: CPT

## 2022-03-27 PROCEDURE — 86901 BLOOD TYPING SEROLOGIC RH(D): CPT

## 2022-03-27 PROCEDURE — 5A1945Z RESPIRATORY VENTILATION, 24-96 CONSECUTIVE HOURS: ICD-10-PCS | Performed by: GENERAL PRACTICE

## 2022-03-27 PROCEDURE — 83880 ASSAY OF NATRIURETIC PEPTIDE: CPT

## 2022-03-27 PROCEDURE — 83605 ASSAY OF LACTIC ACID: CPT

## 2022-03-27 PROCEDURE — 6360000002 HC RX W HCPCS: Performed by: EMERGENCY MEDICINE

## 2022-03-27 PROCEDURE — 2000000000 HC ICU R&B

## 2022-03-27 PROCEDURE — 82805 BLOOD GASES W/O2 SATURATION: CPT

## 2022-03-27 RX ORDER — SODIUM CHLORIDE 9 MG/ML
INJECTION, SOLUTION INTRAVENOUS CONTINUOUS
Status: DISCONTINUED | OUTPATIENT
Start: 2022-03-27 | End: 2022-03-28

## 2022-03-27 RX ORDER — ROCURONIUM BROMIDE 10 MG/ML
100 INJECTION, SOLUTION INTRAVENOUS ONCE
Status: COMPLETED | OUTPATIENT
Start: 2022-03-27 | End: 2022-03-27

## 2022-03-27 RX ORDER — PROPOFOL 10 MG/ML
INJECTION, EMULSION INTRAVENOUS
Status: COMPLETED
Start: 2022-03-27 | End: 2022-03-27

## 2022-03-27 RX ORDER — PROPOFOL 10 MG/ML
5-50 INJECTION, EMULSION INTRAVENOUS CONTINUOUS
Status: DISCONTINUED | OUTPATIENT
Start: 2022-03-27 | End: 2022-03-28

## 2022-03-27 RX ORDER — SODIUM CHLORIDE 9 MG/ML
INJECTION, SOLUTION INTRAVENOUS PRN
Status: DISCONTINUED | OUTPATIENT
Start: 2022-03-27 | End: 2022-03-28

## 2022-03-27 RX ORDER — ETOMIDATE 2 MG/ML
20 INJECTION INTRAVENOUS ONCE
Status: COMPLETED | OUTPATIENT
Start: 2022-03-27 | End: 2022-03-27

## 2022-03-27 RX ORDER — MIDAZOLAM HYDROCHLORIDE 2 MG/2ML
2 INJECTION, SOLUTION INTRAMUSCULAR; INTRAVENOUS ONCE
Status: COMPLETED | OUTPATIENT
Start: 2022-03-27 | End: 2022-03-27

## 2022-03-27 RX ADMIN — MIDAZOLAM HYDROCHLORIDE 2 MG: 1 INJECTION, SOLUTION INTRAMUSCULAR; INTRAVENOUS at 17:52

## 2022-03-27 RX ADMIN — ROCURONIUM BROMIDE 100 MG: 10 INJECTION, SOLUTION INTRAVENOUS at 17:49

## 2022-03-27 RX ADMIN — SODIUM CHLORIDE: 9 INJECTION, SOLUTION INTRAVENOUS at 19:13

## 2022-03-27 RX ADMIN — PIPERACILLIN SODIUM AND TAZOBACTAM SODIUM 4500 MG: 4; .5 INJECTION, POWDER, LYOPHILIZED, FOR SOLUTION INTRAVENOUS at 19:46

## 2022-03-27 RX ADMIN — VANCOMYCIN HYDROCHLORIDE 1250 MG: 1 INJECTION, POWDER, LYOPHILIZED, FOR SOLUTION INTRAVENOUS at 22:19

## 2022-03-27 RX ADMIN — ETOMIDATE 20 MG: 2 INJECTION, SOLUTION INTRAVENOUS at 17:49

## 2022-03-27 RX ADMIN — PROPOFOL 10 MCG/KG/MIN: 10 INJECTION, EMULSION INTRAVENOUS at 18:10

## 2022-03-27 ASSESSMENT — PULMONARY FUNCTION TESTS
PIF_VALUE: 25
PIF_VALUE: 28
PIF_VALUE: 27
PIF_VALUE: 29

## 2022-03-27 ASSESSMENT — PAIN SCALES - GENERAL
PAINLEVEL_OUTOF10: 10
PAINLEVEL_OUTOF10: 10

## 2022-03-27 NOTE — LETTER
[address] Medicaid  CERTIFICATION OF NECESSITY  FOR TRANSPORTATION   BY WHEELCHAIR VAN     Individual Information   1. Name: Nyla Ryder 2. [place] Medicaid Billing Number:    3. Address: [facility]  [address] [place] [address] [place] [address]     Transportation Provider Information   4. Provider Name: [facility]   5. [place] Medicaid Provider Number:  National Provider Identifier (NPI):      Certification  7. Criteria:  By signing this document, the practitioner certifies that two statements are true:  A. This individual must be accompanied by a mobility-related assistive device from the point of pick-up to the point of drop-off. B. Transport of this individual by standard passenger vehicle or common carrier is precluded or contraindicated. 8. Period Beginning Date: 4/1/22   9. Length  [x] Not more than 10 day(s)  [] One Year     Additional Information Relevant to Certification   Patient can transport by wheelchair     Certifying Practitioner Information   11. Name of Practitioner: Radha Lobato DO   12. [place] Medicaid Provider Number, If Applicable:  [address] Provider Identifier (NPI):      Signature Information   14. Date of Signature: 4/1/22 15. Name of Person Signing: Jassi Lee   80. Signature and Professional Designation: Electronically signed by Jassi Lee RN on 4/1/2022 at 11:34 AM       [facility] [id number]  Rev. 7/2015       [address] [place] [address] Encounter Date/Time: 3/27/2022 [facility] Account: [de-identified]    MRN: [id number]    Patient: Marta Victoria Serial #: [id number]      ENCOUNTER          Patient Class: I Private Enc? No Unit RM BD: [id number] [facility] Service: MED   Encounter DX: Altered mental status [R*   ADM Provider: Sukhwinder Alejandro DO   Procedure:     ATT Provider: Radha Lobato, DO   REF Provider:        Admission DX:  Altered mental status, Acute metabolic encephalopathy and DX codes: R41.82, G93.41      PATIENT                 Name: Beata Ferraro : 1990 (31 yrs)   Address: [address] [place] [address] Sex: Female   City: [address] [place] [address]         Marital Status: Single   Employer: NOT EMPLOYED         Synagogue: None   Primary Care Provider: Prema Everett MD         Primary Phone: None   EMERGENCY CONTACT   Contact Name Legal Guardian? Relationship to Patient Home Phone Work Phone   1. Charleen Aviles  2. *No Contact Specified* No    Parent    [phone]                 GUARANTOR            Guarantor: Beata Ferraro     : 1990   Address: [address] [place] [address] Sex: [place]     [place] [address]     Relation to Patient: Self       Home Phone: [phone]   Guarantor ID: [id number]       Work Phone:     Guarantor Employer: NOT EMPLOYED         Status: NOT EMPLO*      COVERAGE        PRIMARY INSURANCE   Payor: Og Soodura Plan: [facility] MEDICAID   Payor Address: CLAIMS DEPARTMENT; [address] [facility]*,  [address] [place] [address], [address] [facility]       Group Number: [id number] Insurance Type: INDEMNITY   Subscriber Name: Hetal Gregory : 1990   Subscriber ID: [id number] Pat. Rel. to Sub: Self   SECONDARY INSURANCE   Payor:   Plan:     Payor Address:  ,           Group Number:   Insurance Type:     Subscriber Name:   Subscriber :     Subscriber ID:   Pat.  Rel. to Sub:             CSN: [id number]

## 2022-03-27 NOTE — ED PROVIDER NOTES
ED  Provider Note  Admit Date/RoomTime: 3/27/2022  5:50 PM  [facility]     HPI:   Efrain Barnett is a 36 y.o. female presenting to the ED for altered mental status, beginning this morning ago. History comes primarily from EMS.          Patient Active Problem List:     Non compliance w medication regimen     Tobacco smoking complicating pregnancy     MTHFR mutation     Generalized abdominal pain     Diabetic ketoacidosis without coma associated with type 1 diabetes mellitus (HCC)     Hypertension     Prolonged QT interval     Iron deficiency anemia     Sinus tachycardia     Bladder dysfunction     Clostridium difficile infection     Hypokalemia     Severe protein-calorie malnutrition ([address]     Uncontrolled type 1 diabetes mellitus with hyperglycemia (HCC)     End stage renal disease (HCC)     Hypothyroidism     Hyperlipidemia     Acute cystitis     Nondisplaced fracture of neck of fifth metacarpal bone, left hand, initial encounter for closed fracture     Chronic right-sided low back pain     Endocarditis     Seizure (HCC)     Hyperkalemia     Hypomagnesemia     Hypocalcemia     Intractable nausea and vomiting     Wound of left leg, sequela     Syncope     Type 1 diabetes mellitus without complication (HCC)     Hyperosmolality     Depression     Lactic acid acidosis     Early satiety     Acute on chronic systolic CHF (congestive heart failure) (HCC)     Other chest pain     Anemia due to chronic kidney disease, on chronic dialysis ([address]     Septic shock (HCC)     ESRD (end stage renal disease) ([address]     Hyperosmolar hyperglycemic state (HHS) ([address]     Hypertensive urgency     Nausea     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) ([address]     ESRD (end stage renal disease) on dialysis ([address]     AMS (altered mental status)     Opioid overdose ([address]     Anemia     Type 1 diabetes mellitus with chronic kidney disease on chronic dialysis ([address]     Elevated TSH     Diabetic acidosis without coma ([address]     DKA, type 1, not at goal [facility])     COVID-19 virus infection     Frequent hospital admissions     Diabetic gastroparesis ([facility]     Diffuse pain     Poor prognosis     Physical deconditioning     Declining functional status     Lack of motivation     Drug-seeking behavior     Behavior problem, adult     Volume overload     Hyperglycemia     Cognitive communication disorder  . The complaint has been persistent, moderate in severity, improved by nothing and worsened by nothing. Associated symptoms include none. Cheryle Lee reportedly is alert and conversive at baseline, however today at the nursing home she was found to be very lethargic and somnolent. Patient was not responding to questioning by the nursing staff at her baseline. Her blood sugar was tested and was reportedly low. EMS was called and she was transported to [address] [place] [address] emergency department. On arrival, she was found to be minimally responsive to painful stimuli and unresponsive to verbal stimuli. On arrival, the patient was assessed with history, physical exam, imaging studies, laboratory studies and ekg, vital signs. Vital signs were stable on arrival and the patient was afebrile. Review of Systems   Unable to perform ROS: Acuity of condition        Physical Exam  Vitals and nursing note reviewed. Constitutional:       Appearance: She is well-developed. She is ill-appearing. HENT:      Head: Normocephalic and atraumatic. Eyes:      Pupils: Pupils are equal, round, and reactive to light. Cardiovascular:      Rate and Rhythm: Normal rate and regular rhythm. Pulmonary:      Effort: Pulmonary effort is normal. No respiratory distress. Breath sounds: Rhonchi (Coarse, rhonchorous breath sounds auscultated throughout, consistent with aspiration) present. No wheezing or rales. Abdominal:      General: Bowel sounds are normal.      Palpations: Abdomen is soft. Tenderness: There is no abdominal tenderness.  There is no guarding or rebound. Musculoskeletal:      Cervical back: Normal range of motion and neck supple. Skin:     General: Skin is warm and dry. Neurological:      Mental Status: She is alert and oriented to person, place, and time. Cranial Nerves: No cranial nerve deficit. Coordination: Coordination normal.          Procedures     Intubation Procedure Note    Indication: impending respiratory failure and potential airway compromise    Consent: Unable to be obtained due to the emergent nature of this procedure. Medications Used: etomidate intravenously and rocuronium intravenously    Procedure: The patient was placed in the appropriate position. Cricoid pressure was not required. Intubation was performed by video laryngoscopy using a 7.5 cuffed endotracheal tube. The cuff was then inflated and the tube was secured appropriately at a distance of 24 cm to the dental ridge. Initial confirmation of placement included bilateral breath sounds, an end tidal CO2 detector, absence of sounds over the stomach, tube fogging, adequate chest rise and adequate pulse oximetry reading. A chest x-ray to verify correct placement of the tube showed appropriate tube position. The patient tolerated the procedure well. Complications: None                Central Line Placement Procedure Note    Indication: vascular access and centrally administered medications    Consent: Unable to be obtained due to the emergent nature of this procedure. Procedure: The patient was positioned appropriately and the skin over the right femoral vein was prepped with chlorhexidine. Local anesthesia was obtained by infiltration using 1% Lidocaine without epinephrine. A large bore needle was used to identify the vein. A guide wire was then inserted into the vein through the needle. A triple lumen catheter was then inserted into the vessel over the guide wire using the Seldinger technique.   All ports showed good, free flowing blood return and were flushed with saline solution. The catheter was then securely fastened to the skin with suture at 20 cm. Two sutures were placed into the proximal eyelets. An antibiotic disk was placed and the site was then covered with a sterile dressing. A post procedure X-ray was not indicated. The patient tolerated the procedure well. Complications: None              MDM  Number of Diagnoses or Management Options  Altered mental status, unspecified altered mental status type  Diagnosis management comments: Emergency department evaluation was notable for altered mental status and potential airway compromise with likely aspiration pneumonia, requiring emergent intubation. Patient is at high risk for further decompensation and would benefit from close observation and management in the inpatient setting in the ICU. Patient was discussed with critical care who did agree to accept the patient from a consulting status. This information was relayed to the patient who understood this plan of care and was amenable to the plan.   Patient was discussed with the admitting service (Dr. Giovani Merritt) who concurred with the decision for admission, and have agreed to admit the patient to the ICU       Amount and/or Complexity of Data Reviewed  Decide to obtain previous medical records or to obtain history from someone other than the patient: yes         ED Course as of 03/28/22 1506   [place] Mar 27, 2022   1940 Spoke with Dr. Abimael Key of critical care, who agreed to follow patient from a consulting status in the ICU [RK]      ED Course User Index  [RK] [place] [id number]., DO     ED Course as of 03/28/22 1506   [place] Mar 27, 2022   1940 Spoke with Dr. Abimael Key of critical care, who agreed to follow patient from a consulting status in the ICU [RK]      ED Course User Index  [RK] [place] [id number]., DO       --------------------------------------------- PAST HISTORY ---------------------------------------------  Past Medical History:  has a past medical history of Acute congestive heart failure ([facility] BC (acute kidney injury) ([facility] Cardiac arrest ([facility] Cephalgia, Chronic kidney disease, Depression, Diabetes mellitus ([address] Diabetic gastroparesis associated with type 1 diabetes mellitus ([address] Diabetic ketoacidosis ([facility] Diabetic ketoacidosis with coma associated with type 1 diabetes mellitus ([address] Diabetic polyneuropathy associated with type 1 diabetes mellitus ([address] Drug use complicating pregnancy in third trimester, Endocarditis, ESRD (end stage renal disease) ([facility] H/O cardiovascular stress test, Hemodialysis patient ([facility] History of blood transfusion, Hyperlipidemia, Hyperosmolar hyperglycemic state (HHS) ([facility] Hypothyroidism, Iron deficiency anemia, MDRO (multiple drug resistant organisms) resistance, MRSA (methicillin resistant Staphylococcus aureus), Non compliance w medication regimen, Other disorders of kidney and ureter, Pregnancy, Previous  delivery affecting pregnancy, antepartum, Previous stillbirth or demise, antepartum, Seizure ([facility] Severe pre-eclampsia in third trimester, Shock liver, and Valvular endocarditis. Past Surgical History:  has a past surgical history that includes ECHO Compl W Dop Color Flow (2012); ECHO Compl W Dop Color Flow (6/10/2013);  section; Tunneled venous port placement (2018); pr esophagogastroduodenoscopy transoral diagnostic (N/A, 2018); back surgery; Colonoscopy (N/A, 2018); Colonoscopy (N/A, 2018); Upper gastrointestinal endoscopy (2018); Upper gastrointestinal endoscopy (N/A, 10/11/2019); Cholecystectomy, laparoscopic (N/A, 2019); LAPAROSCOPY INSERTION PERITONEAL CATHETER (N/A, 2020); transesophageal echocardiogram (N/A, 10/19/2020); embolectomy (N/A, 2020); Foot Debridement (Left, 2021); Foot Debridement (Left, 2021);  Upper gastrointestinal endoscopy (N/A, 2021); and Catheter Removal (N/A, 10/1/2021). Social History:  reports that she quit smoking about 13 months ago. Her smoking use included cigarettes. She has a 3.00 pack-year smoking history. She has never used smokeless tobacco. She reports previous drug use. Drug: Opiates . She reports that she does not drink alcohol. Family History: family history includes Asthma in her brother and mother; Diabetes in her mother; High Blood Pressure in her father and mother; Hypertension in her mother. The patients home medications have been reviewed.     Allergies: Cefepime and Toradol [ketorolac tromethamine]    -------------------------------------------------- RESULTS -------------------------------------------------    LABS:  Results for orders placed or performed during the hospital encounter of 03/27/22   CBC with Auto Differential   Result Value Ref Range    WBC 12.4 (H) 4.5 - 11.5 E9/L    RBC 2.47 (L) 3.50 - 5.50 E12/L    Hemoglobin 6.5 (LL) 11.5 - 15.5 g/dL    Hematocrit 20.3 (L) 34.0 - 48.0 %    MCV 82.2 80.0 - 99.9 fL    MCH 26.3 26.0 - 35.0 pg    MCHC 32.0 32.0 - 34.5 %    RDW 18.2 (H) 11.5 - 15.0 fL    Platelets 561 232 - 173 E9/L    MPV 9.9 7.0 - 12.0 fL    Neutrophils % 85.3 (H) 43.0 - 80.0 %    Immature Granulocytes % 0.5 0.0 - 5.0 %    Lymphocytes % 10.3 (L) 20.0 - 42.0 %    Monocytes % 2.9 2.0 - 12.0 %    Eosinophils % 0.6 0.0 - 6.0 %    Basophils % 0.4 0.0 - 2.0 %    Neutrophils Absolute 10.54 (H) 1.80 - 7.30 E9/L    Immature Granulocytes # 0.06 E9/L    Lymphocytes Absolute 1.28 (L) 1.50 - 4.00 E9/L    Monocytes Absolute 0.36 0.10 - 0.95 E9/L    Eosinophils Absolute 0.08 0.05 - 0.50 E9/L    Basophils Absolute 0.05 0.00 - 0.20 E9/L   Comprehensive Metabolic Panel   Result Value Ref Range    Sodium 135 132 - 146 mmol/L    Potassium 5.0 3.5 - 5.0 mmol/L    Chloride 100 98 - 107 mmol/L    CO2 25 22 - 29 mmol/L    Anion Gap 10 7 - 16 mmol/L    Glucose 163 (H) 74 - 99 mg/dL    BUN 38 (H) 6 - 20 mg/dL    CREATININE 4.9 (H) 0.5 - 1.0 mg/dL GFR Non-African American 13 >=60 mL/min/1.73    GFR African American 13     Calcium 8.7 8.6 - 10.2 mg/dL    Total Protein 6.7 6.4 - 8.3 g/dL    Albumin 2.5 (L) 3.5 - 5.2 g/dL    Total Bilirubin 0.4 0.0 - 1.2 mg/dL    Alkaline Phosphatase 234 (H) 35 - 104 U/L    ALT 20 0 - 32 U/L    AST 33 (H) 0 - 31 U/L   Troponin   Result Value Ref Range    Troponin, High Sensitivity 206 (H) 0 - 9 ng/L   Protime-INR   Result Value Ref Range    Protime 12.8 (H) 9.3 - 12.4 sec    INR 1.2    APTT   Result Value Ref Range    aPTT 34.1 24.5 - 35.1 sec   Urinalysis   Result Value Ref Range    Color, UA Yellow Straw/Yellow    Clarity, UA Clear Clear    Glucose, Ur 250 (A) Negative mg/dL    Bilirubin Urine Negative Negative    Ketones, Urine Negative Negative mg/dL    Specific Gravity, UA 1.020 1.005 - 1.030    Blood, Urine LARGE (A) Negative    pH, UA 8.0 5.0 - 9.0    Protein, UA >=300 (A) Negative mg/dL    Urobilinogen, Urine 0.2 <2.0 E.U./dL    Nitrite, Urine Negative Negative    Leukocyte Esterase, Urine LARGE (A) Negative   Beta-Hydroxybutyrate   Result Value Ref Range    Beta-Hydroxybutyrate 0.10 0.02 - 0.27 mmol/L   Serum Drug Screen   Result Value Ref Range    Ethanol Lvl <10 mg/dL    Acetaminophen Level <5.0 (L) 10.0 - 33.1 mcg/mL    Salicylate, Serum <1.1 0.0 - 30.0 mg/dL    TCA Scrn NEGATIVE Cutoff:300 ng/mL   Lactate, Sepsis   Result Value Ref Range    Lactic Acid, Sepsis 1.5 0.5 - 1.9 mmol/L   Brain Natriuretic Peptide   Result Value Ref Range    Pro-BNP >70,000 (H) 0 - 125 pg/mL   Ammonia   Result Value Ref Range    Ammonia 19.0 11.0 - 51.0 umol/L   Ammonia   Result Value Ref Range    Ammonia 22.0 11.0 - 51.0 umol/L   Microscopic Urinalysis   Result Value Ref Range    WBC, UA >20 (A) 0 - 5 /HPF    RBC, UA 1-3 0 - 2 /HPF    Bacteria, UA MODERATE (A) None Seen /HPF   Pregnancy, Urine   Result Value Ref Range    HCG(Urine) Pregnancy Test NEGATIVE NEGATIVE   Blood Gas, Arterial   Result Value Ref Range    Date Analyzed [id number]     Time Analyzed 1920     Source: Blood Arterial     pH, Blood Gas 7.418 7.350 - 7.450    PCO2 37.8 35.0 - 45.0 mmHg    PO2 43.3 (L) 75.0 - 100.0 mmHg    HCO3 23.9 22.0 - 26.0 mmol/L    B.E. -0.5 -3.0 - 3.0 mmol/L    O2 Sat 75.5 (L) 92.0 - 98.5 %    PO2/FIO2 0.43 mmHg/%    AaDO2 606.9 mmHg    RI(T) 14.02     O2Hb 74.7 (L) 94.0 - 97.0 %    COHb 0.8 0.0 - 1.5 %    MetHb 0.3 0.0 - 1.5 %    HHb 24.2 (H) 0.0 - 5.0 %    tHb (est) 7.8 (L) 11.5 - 16.5 g/dL    Mode AFM     FIO2 100.0 %    Rr Mechanical 18.0 b/min    Vt Mechanical 450.0 mL    Peep/Cpap 8.0 cmH2O    Date Of Collection      Time Collected      Pt Temp 37.0 C     ID [id number]     Lab [id number]     Critical(s) Notified . No Critical Values    Blood Gas, Arterial   Result Value Ref Range    Date Analyzed [id number]     Time Analyzed 2147     Source: Blood Arterial     pH, Blood Gas 7.573 (H) 7.350 - 7.450    PCO2 22.4 (L) 35.0 - 45.0 mmHg    PO2 95.3 75.0 - 100.0 mmHg    HCO3 20.2 (L) 22.0 - 26.0 mmol/L    B.E. -1.3 -3.0 - 3.0 mmol/L    O2 Sat 96.8 92.0 - 98.5 %    PO2/FIO2 1.91 mmHg/%    AaDO2 223.5 mmHg    RI(T) 2.35     O2Hb 96.1 94.0 - 97.0 %    COHb 0.5 0.0 - 1.5 %    MetHb 0.2 0.0 - 1.5 %    HHb 3.2 0.0 - 5.0 %    tHb (est) 7.4 (L) 11.5 - 16.5 g/dL    Mode AC     FIO2 50.0 %    Rr Mechanical 18.0 b/min    Vt Mechanical 400.0 mL    Peep/Cpap 8.0 cmH2O    Comment Patient was on RR 18 NOT 10      Date Of Collection      Time Collected      Pt Temp 37.0 C     ID [id number]     Lab [id number]     Critical(s) Notified .  No Critical Values    Lactic Acid   Result Value Ref Range    Lactic Acid 1.0 0.5 - 2.2 mmol/L   CBC with Auto Differential   Result Value Ref Range    WBC 8.0 4.5 - 11.5 E9/L    RBC 3.03 (L) 3.50 - 5.50 E12/L    Hemoglobin 7.9 (L) 11.5 - 15.5 g/dL    Hematocrit 24.6 (L) 34.0 - 48.0 %    MCV 81.2 80.0 - 99.9 fL    MCH 26.1 26.0 - 35.0 pg    MCHC 32.1 32.0 - 34.5 %    RDW 17.3 (H) 11.5 - 15.0 fL    Platelets 764 337 - 832 E9/L    MPV 10.3 7.0 - 12.0 fL    Neutrophils % 77.7 43.0 - 80.0 %    Immature Granulocytes % 0.2 0.0 - 5.0 %    Lymphocytes % 16.2 (L) 20.0 - 42.0 %    Monocytes % 3.7 2.0 - 12.0 %    Eosinophils % 1.6 0.0 - 6.0 %    Basophils % 0.6 0.0 - 2.0 %    Neutrophils Absolute 6.24 1.80 - 7.30 E9/L    Immature Granulocytes # 0.02 E9/L    Lymphocytes Absolute 1.30 (L) 1.50 - 4.00 E9/L    Monocytes Absolute 0.30 0.10 - 0.95 E9/L    Eosinophils Absolute 0.13 0.05 - 0.50 E9/L    Basophils Absolute 0.05 0.00 - 0.20 C6/R   Basic Metabolic Panel   Result Value Ref Range    Sodium 132 132 - 146 mmol/L    Potassium 6.0 (H) 3.5 - 5.0 mmol/L    Chloride 99 98 - 107 mmol/L    CO2 22 22 - 29 mmol/L    Anion Gap 11 7 - 16 mmol/L    Glucose 78 74 - 99 mg/dL    BUN 38 (H) 6 - 20 mg/dL    CREATININE 4.7 (H) 0.5 - 1.0 mg/dL    GFR Non-African American 13 >=60 mL/min/1.73    GFR African American 13     Calcium 8.1 (L) 8.6 - 10.2 mg/dL   Magnesium   Result Value Ref Range    Magnesium 2.0 1.6 - 2.6 mg/dL   Phosphorus   Result Value Ref Range    Phosphorus 4.1 2.5 - 4.5 mg/dL   Calcium, Ionized   Result Value Ref Range    Calcium, Ion 1.16 1.15 - 1.33 mmol/L   Hepatic Function Panel   Result Value Ref Range    Total Protein 6.6 6.4 - 8.3 g/dL    Albumin 2.3 (L) 3.5 - 5.2 g/dL    Alkaline Phosphatase 231 (H) 35 - 104 U/L    ALT 31 0 - 32 U/L    AST 82 (H) 0 - 31 U/L    Total Bilirubin 0.7 0.0 - 1.2 mg/dL    Bilirubin, Direct 0.2 0.0 - 0.3 mg/dL    Bilirubin, Indirect 0.5 0.0 - 1.0 mg/dL   CBC with Auto Differential   Result Value Ref Range    WBC 6.0 4.5 - 11.5 E9/L    RBC 2.80 (L) 3.50 - 5.50 E12/L    Hemoglobin 7.5 (L) 11.5 - 15.5 g/dL    Hematocrit 22.9 (L) 34.0 - 48.0 %    MCV 81.8 80.0 - 99.9 fL    MCH 26.8 26.0 - 35.0 pg    MCHC 32.8 32.0 - 34.5 %    RDW 17.2 (H) 11.5 - 15.0 fL    Platelets 082 229 - 459 E9/L    MPV 10.4 7.0 - 12.0 fL    Neutrophils % 76.5 43.0 - 80.0 %    Lymphocytes % 19.1 (L) 20.0 - 42.0 %    Monocytes % 2.6 2.0 - 12.0 %    Eosinophils % 1.8 0.0 - 6.0 %    Basophils % 1.0 0.0 - 2.0 %    Neutrophils Absolute 4.62 1.80 - 7.30 E9/L    Lymphocytes Absolute 1.14 (L) 1.50 - 4.00 E9/L    Monocytes Absolute 0.18 0.10 - 0.95 E9/L    Eosinophils Absolute 0.11 0.05 - 0.50 E9/L    Basophils Absolute 0.00 0.00 - 0.20 E9/L    nRBC 0.9 /100 WBC    Anisocytosis 2+     Polychromasia 1+     Hypochromia 2+     Poikilocytes 2+     Ovalocytes 1+     Target Cells 2+    Basic Metabolic Panel   Result Value Ref Range    Sodium 136 132 - 146 mmol/L    Potassium 4.7 3.5 - 5.0 mmol/L    Chloride 103 98 - 107 mmol/L    CO2 22 22 - 29 mmol/L    Anion Gap 11 7 - 16 mmol/L    Glucose 92 74 - 99 mg/dL    BUN 38 (H) 6 - 20 mg/dL    CREATININE 4.7 (H) 0.5 - 1.0 mg/dL    GFR Non-African American 13 >=60 mL/min/1.73    GFR African American 13     Calcium 7.9 (L) 8.6 - 10.2 mg/dL   Magnesium   Result Value Ref Range    Magnesium 1.9 1.6 - 2.6 mg/dL   Phosphorus   Result Value Ref Range    Phosphorus 3.9 2.5 - 4.5 mg/dL   Calcium, Ionized   Result Value Ref Range    Calcium, Ion 1.16 1.15 - 1.33 mmol/L   Hepatic Function Panel   Result Value Ref Range    Total Protein 6.2 (L) 6.4 - 8.3 g/dL    Albumin 2.1 (L) 3.5 - 5.2 g/dL    Alkaline Phosphatase 210 (H) 35 - 104 U/L    ALT 28 0 - 32 U/L    AST 58 (H) 0 - 31 U/L    Total Bilirubin 0.5 0.0 - 1.2 mg/dL    Bilirubin, Direct 0.2 0.0 - 0.3 mg/dL    Bilirubin, Indirect 0.3 0.0 - 1.0 mg/dL   Procalcitonin   Result Value Ref Range    Procalcitonin 18.88 (H) 0.00 - 0.08 ng/mL   Urinalysis with Microscopic   Result Value Ref Range    Color, UA Yellow Straw/Yellow    Clarity, UA Clear Clear    Glucose, Ur 100 (A) Negative mg/dL    Bilirubin Urine Negative Negative    Ketones, Urine Negative Negative mg/dL    Specific Gravity, UA 1.015 1.005 - 1.030    Blood, Urine MODERATE (A) Negative    pH, UA 8.5 5.0 - 9.0    Protein, UA >=300 (A) Negative mg/dL    Urobilinogen, Urine 0.2 <2.0 E.U./dL    Nitrite, Urine Negative Negative    Leukocyte Esterase, Urine LARGE (A) Negative    WBC, UA >20 (A) 0 - 5 /HPF    RBC, UA 5-10 (A) 0 - 2 /HPF    Bacteria, UA MANY (A) None Seen /HPF   URINE DRUG SCREEN   Result Value Ref Range    Amphetamine Screen, Urine NOT DETECTED Negative <1000 ng/mL    Barbiturate Screen, Ur NOT DETECTED Negative < 200 ng/mL    Benzodiazepine Screen, Urine NOT DETECTED Negative < 200 ng/mL    Cannabinoid Scrn, Ur NOT DETECTED Negative < 50ng/mL    Cocaine Metabolite Screen, Urine NOT DETECTED Negative < 300 ng/mL    Opiate Scrn, Ur NOT DETECTED Negative < 300ng/mL    PCP Screen, Urine NOT DETECTED Negative < 25 ng/mL    Methadone Screen, Urine NOT DETECTED Negative <300 ng/mL    Oxycodone Urine NOT DETECTED Negative <100 ng/mL    FENTANYL SCREEN, URINE NOT DETECTED Negative <1 ng/mL    Drug Screen Comment: see below    Serum Drug Screen   Result Value Ref Range    Ethanol Lvl <10 mg/dL    Acetaminophen Level <5.0 (L) 10.0 - 23.5 mcg/mL    Salicylate, Serum <0.8 0.0 - 30.0 mg/dL    TCA Scrn NEGATIVE Cutoff:300 ng/mL   TSH   Result Value Ref Range    TSH 5.370 (H) 0.270 - 4.200 uIU/mL   T4, Free   Result Value Ref Range    T4 Free 1.10 0.93 - 1.70 ng/dL   Iron and TIBC   Result Value Ref Range    Iron 136 37 - 145 mcg/dL    TIBC 180 (L) 250 - 450 mcg/dL    Iron Saturation 76 (H) 15 - 50 %   Ferritin   Result Value Ref Range    Ferritin 386 ng/mL   Blood Gas, Arterial   Result Value Ref Range    Date Analyzed [id number]     Time Analyzed 0224     Source: Blood Arterial     pH, Blood Gas 7.441 7.350 - 7.450    PCO2 35.3 35.0 - 45.0 mmHg    PO2 136.5 (H) 75.0 - 100.0 mmHg    HCO3 23.5 22.0 - 26.0 mmol/L    B.E. -0.4 -3.0 - 3.0 mmol/L    O2 Sat 98.6 (H) 92.0 - 98.5 %    PO2/FIO2 3.41 mmHg/%    AaDO2 98.1 mmHg    RI(T) 0.72     O2Hb 97.7 (H) 94.0 - 97.0 %    COHb 0.8 0.0 - 1.5 %    MetHb 0.1 0.0 - 1.5 %    O2 Content 13.1 mL/dL    HHb 1.4 0.0 - 5.0 %    tHb (est) 9.3 (L) 11.5 - 16.5 g/dL    Mode AC     FIO2 40.0 %    Rr Mechanical 12.0 b/min    Vt Mechanical 350.0 mL    Peep/Cpap 8.0 cmH2O    Date Of Collection      Time Collected      Pt Temp 37.0 C     ID [id number]     Lab [id number]     Critical(s) Notified . No Critical Values    Basic Metabolic Panel   Result Value Ref Range    Sodium 134 132 - 146 mmol/L    Potassium 4.7 3.5 - 5.0 mmol/L    Chloride 101 98 - 107 mmol/L    CO2 22 22 - 29 mmol/L    Anion Gap 11 7 - 16 mmol/L    Glucose 133 (H) 74 - 99 mg/dL    BUN 39 (H) 6 - 20 mg/dL    CREATININE 4.8 (H) 0.5 - 1.0 mg/dL    GFR Non-African American 13 >=60 mL/min/1.73    GFR African American 13     Calcium 7.8 (L) 8.6 - 10.2 mg/dL   Cortisol Total   Result Value Ref Range    Cortisol 5.72 2.68 - 18.40 mcg/dL   Cortisol Total   Result Value Ref Range    Cortisol 7.76 2.68 - 18.40 mcg/dL   Blood Gas, Arterial   Result Value Ref Range    Date Analyzed [id number]     Time Analyzed 0701     Source: Blood Arterial     pH, Blood Gas 7.442 7.350 - 7.450    PCO2 31.9 (L) 35.0 - 45.0 mmHg    PO2 145.4 (H) 75.0 - 100.0 mmHg    HCO3 21.3 (L) 22.0 - 26.0 mmol/L    B.E. -2.4 -3.0 - 3.0 mmol/L    O2 Sat 98.8 (H) 92.0 - 98.5 %    PO2/FIO2 3.63 mmHg/%    AaDO2 93.1 mmHg    RI(T) 0.64     O2Hb 97.6 (H) 94.0 - 97.0 %    COHb 1.0 0.0 - 1.5 %    MetHb 0.2 0.0 - 1.5 %    O2 Content 11.7 mL/dL    HHb 1.2 0.0 - 5.0 %    tHb (est) 8.3 (L) 11.5 - 16.5 g/dL    Mode AC     FIO2 40.0 %    Rr Mechanical 12.0 b/min    Vt Mechanical 350.0 mL    Peep/Cpap 8.0 cmH2O    Date Of Collection      Time Collected      Pt Temp 37.0 C     ID [id number]     Lab [id number]     Critical(s) Notified .  No Critical Values    Miscellaneous Sendout   Result Value Ref Range    test code [id number]    POCT Glucose   Result Value Ref Range    Meter Glucose 159 (H) 74 - 99 mg/dL   POCT Glucose   Result Value Ref Range    Meter Glucose 178 (H) 74 - 99 mg/dL   POC Pregnancy Urine Qual   Result Value Ref Range    HCG, Urine, POC Negative Negative    Lot Number [id number]     Positive QC Pass/Fail Pass     Negative QC Pass/Fail Pass    POCT Glucose   Result Value Ref Range    Meter Glucose 45 (L) 74 - 99 mg/dL   POCT Glucose   Result Value Ref Range    Meter Glucose 126 (H) 74 - 99 mg/dL   POCT Glucose   Result Value Ref Range    Meter Glucose 94 74 - 99 mg/dL   POCT Glucose   Result Value Ref Range    Meter Glucose 109 (H) 74 - 99 mg/dL   POCT Glucose   Result Value Ref Range    Meter Glucose 110 (H) 74 - 99 mg/dL   POCT Glucose   Result Value Ref Range    Meter Glucose 114 (H) 74 - 99 mg/dL   POCT Glucose   Result Value Ref Range    Meter Glucose 122 (H) 74 - 99 mg/dL   POCT Glucose   Result Value Ref Range    Meter Glucose 160 (H) 74 - 99 mg/dL   POCT Glucose   Result Value Ref Range    Meter Glucose 211 (H) 74 - 99 mg/dL   POCT Glucose   Result Value Ref Range    Meter Glucose 188 (H) 74 - 99 mg/dL   EKG 12 Lead   Result Value Ref Range    Ventricular Rate 106 BPM    Atrial Rate 106 BPM    P-R Interval 120 ms    QRS Duration 86 ms    Q-T Interval 366 ms    QTc Calculation (Bazett) 486 ms    P Axis 48 degrees    R Axis 54 degrees    T Axis 64 degrees   TYPE AND SCREEN   Result Value Ref Range    ABO/Rh O POS     Antibody Screen NEG    PREPARE RBC (CROSSMATCH), 1 Units   Result Value Ref Range    Product Code Blood Bank [id number]     Description Blood Bank Red Blood Cells, Leuko-reduced     Unit Number [id number]     Dispense Status Blood Bank transfused        RADIOLOGY:  XR CHEST PORTABLE   Final Result   Infiltrate and or atelectasis at the left lower lobe. Substantially resolved   infiltrate and or atelectasis on the right. New NG tube. CT HEAD WO CONTRAST   Final Result   No acute intracranial abnormality or hemorrhage. CT ABDOMEN PELVIS WO CONTRAST Additional Contrast? None   Final Result   1. Moderate ascites throughout abdomen and pelvis. 2.  Multiple thick walled loops of small bowel throughout the abdomen could   suggest nonspecific infectious or inflammatory enteritis.       3.  Generalized 03/28/22 0500 (!) 168/119 -- -- 80 12 100 % -- --   03/28/22 0437 -- -- -- -- -- -- -- 138 lb (62.6 kg)   03/28/22 0430 (!) 163/118 -- -- 82 12 100 % -- --   03/28/22 0400 (!) 132/96 -- -- 78 12 100 % -- --   03/28/22 0330 112/86 -- -- 79 16 -- -- --   03/28/22 0300 107/89 -- -- 83 13 100 % -- --   03/28/22 0200 -- -- -- 91 14 100 % -- --   03/28/22 0130 (!) 164/123 -- -- 92 12 100 % -- --   03/28/22 0114 -- 95.4 °F (35.2 °C) Bladder -- -- -- -- 138 lb 3.7 oz (62.7 kg)   03/28/22 0033 (!) 154/109 -- -- 88 12 100 % -- --   03/28/22 0018 (!) 160/115 -- -- 91 12 100 % -- --   03/28/22 0009 (!) 174/117 98.1 °F (36.7 °C) -- 98 (!) 37 100 % -- --   03/28/22 0005 -- -- -- 93 12 100 % -- --   03/27/22 2256 (!) 188/126 -- -- 99 12 100 % -- --   03/27/22 2156 -- -- -- 100 12 100 % -- --   03/27/22 2155 -- -- -- 101 14 100 % 5' 4\" (1.626 m) --   03/27/22 2154 (!) 183/127 -- -- 101 18 100 % -- --   03/27/22 2110 (!) 179/118 98.1 °F (36.7 °C) -- 93 (!) 37 100 % -- --   03/27/22 2055 (!) 195/123 98 °F (36.7 °C) -- 102 16 100 % -- --   03/27/22 1818 (!) 176/120 -- -- 107 -- 100 % -- --   03/27/22 1805 -- -- -- 108 19 100 % -- --   03/27/22 1758 (!) 182/129 -- -- 107 18 100 % -- --   03/27/22 1753 -- 98.2 °F (36.8 °C) Axillary -- -- -- -- --   03/27/22 1752 -- -- -- -- -- -- -- 129 lb (58.5 kg)   03/27/22 1750 (!) 191/119 -- -- 121 14 100 % -- --       Oxygen Saturation Interpretation: Normal    ------------------------------------------ PROGRESS NOTES ------------------------------------------  Re-evaluation(s):  Patients symptoms show no change  Repeat physical examination is not changed    Counseling:  I have spoken with the patient and discussed todays results, in addition to providing specific details for the plan of care and counseling regarding the diagnosis and prognosis.   Their questions are answered at this time and they are agreeable with the plan of admission.    --------------------------------- ADDITIONAL PROVIDER NOTES ---------------------------------  Consultations:  Spoke with Dr. Eleno Meyers. Discussed case. They will admit the patient. This patient's ED course included: a personal history and physicial examination, re-evaluation prior to disposition, multiple bedside re-evaluations, IV medications, cardiac monitoring, continuous pulse oximetry and complex medical decision making and emergency management    This patient has remained hemodynamically stable during their ED course. Diagnosis:  1. Altered mental status, unspecified altered mental status type        Disposition:  Patient's disposition: Admit to CCU/ICU  Patient's condition is serious. Please note that the withdrawal or failure to initiate urgent interventions for this patient would likely result in a life threatening deterioration or permanent disability. Accordingly this patient received 30 minutes of critical care time, excluding separately billable procedures.        Ben  43., DO  Resident  03/28/22 [id number] [facility]  Resident  03/28/22 [id number]

## 2022-03-28 ENCOUNTER — APPOINTMENT (OUTPATIENT)
Dept: GENERAL RADIOLOGY | Age: 32
DRG: 420 | End: 2022-03-28
Payer: COMMERCIAL

## 2022-03-28 PROBLEM — G93.41 ACUTE METABOLIC ENCEPHALOPATHY: Status: ACTIVE | Noted: 2022-03-28

## 2022-03-28 LAB
AADO2: 93.1 MMHG
AADO2: 98.1 MMHG
ACETAMINOPHEN LEVEL: <5 MCG/ML (ref 10–30)
ADENOVIRUS BY PCR: NOT DETECTED
ALBUMIN SERPL-MCNC: 2.1 G/DL (ref 3.5–5.2)
ALBUMIN SERPL-MCNC: 2.3 G/DL (ref 3.5–5.2)
ALP BLD-CCNC: 210 U/L (ref 35–104)
ALP BLD-CCNC: 231 U/L (ref 35–104)
ALT SERPL-CCNC: 28 U/L (ref 0–32)
ALT SERPL-CCNC: 31 U/L (ref 0–32)
AMMONIA: 22 UMOL/L (ref 11–51)
AMPHETAMINE SCREEN, URINE: NOT DETECTED
ANION GAP SERPL CALCULATED.3IONS-SCNC: 11 MMOL/L (ref 7–16)
ANISOCYTOSIS: ABNORMAL
AST SERPL-CCNC: 58 U/L (ref 0–31)
AST SERPL-CCNC: 82 U/L (ref 0–31)
B.E.: -0.4 MMOL/L (ref -3–3)
B.E.: -2.4 MMOL/L (ref -3–3)
BACTERIA: ABNORMAL /HPF
BARBITURATE SCREEN URINE: NOT DETECTED
BASOPHILS ABSOLUTE: 0 E9/L (ref 0–0.2)
BASOPHILS ABSOLUTE: 0.05 E9/L (ref 0–0.2)
BASOPHILS RELATIVE PERCENT: 0.6 % (ref 0–2)
BASOPHILS RELATIVE PERCENT: 1 % (ref 0–2)
BENZODIAZEPINE SCREEN, URINE: NOT DETECTED
BILIRUB SERPL-MCNC: 0.5 MG/DL (ref 0–1.2)
BILIRUB SERPL-MCNC: 0.7 MG/DL (ref 0–1.2)
BILIRUBIN DIRECT: 0.2 MG/DL (ref 0–0.3)
BILIRUBIN DIRECT: 0.2 MG/DL (ref 0–0.3)
BILIRUBIN URINE: NEGATIVE
BILIRUBIN, INDIRECT: 0.3 MG/DL (ref 0–1)
BILIRUBIN, INDIRECT: 0.5 MG/DL (ref 0–1)
BLOOD, URINE: ABNORMAL
BORDETELLA PARAPERTUSSIS BY PCR: NOT DETECTED
BORDETELLA PERTUSSIS BY PCR: NOT DETECTED
BUN BLDV-MCNC: 38 MG/DL (ref 6–20)
BUN BLDV-MCNC: 38 MG/DL (ref 6–20)
BUN BLDV-MCNC: 39 MG/DL (ref 6–20)
CALCIUM IONIZED: 1.16 MMOL/L (ref 1.15–1.33)
CALCIUM IONIZED: 1.16 MMOL/L (ref 1.15–1.33)
CALCIUM SERPL-MCNC: 7.8 MG/DL (ref 8.6–10.2)
CALCIUM SERPL-MCNC: 7.9 MG/DL (ref 8.6–10.2)
CALCIUM SERPL-MCNC: 8.1 MG/DL (ref 8.6–10.2)
CANNABINOID SCREEN URINE: NOT DETECTED
CHLAMYDOPHILIA PNEUMONIAE BY PCR: NOT DETECTED
CHLORIDE BLD-SCNC: 101 MMOL/L (ref 98–107)
CHLORIDE BLD-SCNC: 103 MMOL/L (ref 98–107)
CHLORIDE BLD-SCNC: 99 MMOL/L (ref 98–107)
CLARITY: CLEAR
CO2: 22 MMOL/L (ref 22–29)
COCAINE METABOLITE SCREEN URINE: NOT DETECTED
COHB: 0.8 % (ref 0–1.5)
COHB: 1 % (ref 0–1.5)
COLOR: YELLOW
CORONAVIRUS 229E BY PCR: NOT DETECTED
CORONAVIRUS HKU1 BY PCR: NOT DETECTED
CORONAVIRUS NL63 BY PCR: NOT DETECTED
CORONAVIRUS OC43 BY PCR: NOT DETECTED
CORTISOL TOTAL: 5.72 MCG/DL (ref 2.68–18.4)
CORTISOL TOTAL: 7.76 MCG/DL (ref 2.68–18.4)
CREAT SERPL-MCNC: 4.7 MG/DL (ref 0.5–1)
CREAT SERPL-MCNC: 4.7 MG/DL (ref 0.5–1)
CREAT SERPL-MCNC: 4.8 MG/DL (ref 0.5–1)
CRITICAL: ABNORMAL
CRITICAL: ABNORMAL
DATE ANALYZED: ABNORMAL
DATE ANALYZED: ABNORMAL
DATE OF COLLECTION: ABNORMAL
DATE OF COLLECTION: ABNORMAL
EKG ATRIAL RATE: 106 BPM
EKG P AXIS: 48 DEGREES
EKG P-R INTERVAL: 120 MS
EKG Q-T INTERVAL: 366 MS
EKG QRS DURATION: 86 MS
EKG QTC CALCULATION (BAZETT): 486 MS
EKG R AXIS: 54 DEGREES
EKG T AXIS: 64 DEGREES
EKG VENTRICULAR RATE: 106 BPM
EOSINOPHILS ABSOLUTE: 0.11 E9/L (ref 0.05–0.5)
EOSINOPHILS ABSOLUTE: 0.13 E9/L (ref 0.05–0.5)
EOSINOPHILS RELATIVE PERCENT: 1.6 % (ref 0–6)
EOSINOPHILS RELATIVE PERCENT: 1.8 % (ref 0–6)
ETHANOL: <10 MG/DL (ref 0–0.08)
FENTANYL SCREEN, URINE: NOT DETECTED
FERRITIN: 386 NG/ML
FIO2: 40 %
FIO2: 40 %
GFR AFRICAN AMERICAN: 13
GFR NON-AFRICAN AMERICAN: 13 ML/MIN/1.73
GLUCOSE BLD-MCNC: 133 MG/DL (ref 74–99)
GLUCOSE BLD-MCNC: 78 MG/DL (ref 74–99)
GLUCOSE BLD-MCNC: 92 MG/DL (ref 74–99)
GLUCOSE URINE: 100 MG/DL
HCO3: 21.3 MMOL/L (ref 22–26)
HCO3: 23.5 MMOL/L (ref 22–26)
HCT VFR BLD CALC: 22.9 % (ref 34–48)
HCT VFR BLD CALC: 24 % (ref 34–48)
HCT VFR BLD CALC: 24.3 % (ref 34–48)
HCT VFR BLD CALC: 24.6 % (ref 34–48)
HEMOGLOBIN: 7.5 G/DL (ref 11.5–15.5)
HEMOGLOBIN: 7.8 G/DL (ref 11.5–15.5)
HEMOGLOBIN: 7.9 G/DL (ref 11.5–15.5)
HEMOGLOBIN: 7.9 G/DL (ref 11.5–15.5)
HHB: 1.2 % (ref 0–5)
HHB: 1.4 % (ref 0–5)
HUMAN METAPNEUMOVIRUS BY PCR: NOT DETECTED
HUMAN RHINOVIRUS/ENTEROVIRUS BY PCR: NOT DETECTED
HYPOCHROMIA: ABNORMAL
IMMATURE GRANULOCYTES #: 0.02 E9/L
IMMATURE GRANULOCYTES %: 0.2 % (ref 0–5)
INFLUENZA A BY PCR: NOT DETECTED
INFLUENZA B BY PCR: NOT DETECTED
IRON SATURATION: 76 % (ref 15–50)
IRON: 136 MCG/DL (ref 37–145)
KETONES, URINE: NEGATIVE MG/DL
LAB: ABNORMAL
LAB: ABNORMAL
LACTIC ACID: 1 MMOL/L (ref 0.5–2.2)
LEUKOCYTE ESTERASE, URINE: ABNORMAL
LYMPHOCYTES ABSOLUTE: 1.14 E9/L (ref 1.5–4)
LYMPHOCYTES ABSOLUTE: 1.3 E9/L (ref 1.5–4)
LYMPHOCYTES RELATIVE PERCENT: 16.2 % (ref 20–42)
LYMPHOCYTES RELATIVE PERCENT: 19.1 % (ref 20–42)
Lab: ABNORMAL
Lab: ABNORMAL
Lab: NORMAL
MAGNESIUM: 1.9 MG/DL (ref 1.6–2.6)
MAGNESIUM: 2 MG/DL (ref 1.6–2.6)
MCH RBC QN AUTO: 26.1 PG (ref 26–35)
MCH RBC QN AUTO: 26.8 PG (ref 26–35)
MCHC RBC AUTO-ENTMCNC: 32.1 % (ref 32–34.5)
MCHC RBC AUTO-ENTMCNC: 32.8 % (ref 32–34.5)
MCV RBC AUTO: 81.2 FL (ref 80–99.9)
MCV RBC AUTO: 81.8 FL (ref 80–99.9)
METER GLUCOSE: 109 MG/DL (ref 74–99)
METER GLUCOSE: 110 MG/DL (ref 74–99)
METER GLUCOSE: 114 MG/DL (ref 74–99)
METER GLUCOSE: 122 MG/DL (ref 74–99)
METER GLUCOSE: 126 MG/DL (ref 74–99)
METER GLUCOSE: 160 MG/DL (ref 74–99)
METER GLUCOSE: 188 MG/DL (ref 74–99)
METER GLUCOSE: 211 MG/DL (ref 74–99)
METER GLUCOSE: 217 MG/DL (ref 74–99)
METER GLUCOSE: 236 MG/DL (ref 74–99)
METER GLUCOSE: 254 MG/DL (ref 74–99)
METER GLUCOSE: 278 MG/DL (ref 74–99)
METER GLUCOSE: 287 MG/DL (ref 74–99)
METER GLUCOSE: 45 MG/DL (ref 74–99)
METER GLUCOSE: 94 MG/DL (ref 74–99)
METHADONE SCREEN, URINE: NOT DETECTED
METHB: 0.1 % (ref 0–1.5)
METHB: 0.2 % (ref 0–1.5)
MODE: AC
MODE: AC
MONOCYTES ABSOLUTE: 0.18 E9/L (ref 0.1–0.95)
MONOCYTES ABSOLUTE: 0.3 E9/L (ref 0.1–0.95)
MONOCYTES RELATIVE PERCENT: 2.6 % (ref 2–12)
MONOCYTES RELATIVE PERCENT: 3.7 % (ref 2–12)
MYCOPLASMA PNEUMONIAE BY PCR: NOT DETECTED
NEUTROPHILS ABSOLUTE: 4.62 E9/L (ref 1.8–7.3)
NEUTROPHILS ABSOLUTE: 6.24 E9/L (ref 1.8–7.3)
NEUTROPHILS RELATIVE PERCENT: 76.5 % (ref 43–80)
NEUTROPHILS RELATIVE PERCENT: 77.7 % (ref 43–80)
NITRITE, URINE: NEGATIVE
NUCLEATED RED BLOOD CELLS: 0.9 /100 WBC
O2 CONTENT: 11.7 ML/DL
O2 CONTENT: 13.1 ML/DL
O2 SATURATION: 98.6 % (ref 92–98.5)
O2 SATURATION: 98.8 % (ref 92–98.5)
O2HB: 97.6 % (ref 94–97)
O2HB: 97.7 % (ref 94–97)
OPERATOR ID: 2593
OPERATOR ID: 2593
OPIATE SCREEN URINE: NOT DETECTED
OVALOCYTES: ABNORMAL
OXYCODONE URINE: NOT DETECTED
PARAINFLUENZA VIRUS 1 BY PCR: NOT DETECTED
PARAINFLUENZA VIRUS 2 BY PCR: NOT DETECTED
PARAINFLUENZA VIRUS 3 BY PCR: NOT DETECTED
PARAINFLUENZA VIRUS 4 BY PCR: NOT DETECTED
PATIENT TEMP: 37 C
PATIENT TEMP: 37 C
PCO2: 31.9 MMHG (ref 35–45)
PCO2: 35.3 MMHG (ref 35–45)
PDW BLD-RTO: 17.2 FL (ref 11.5–15)
PDW BLD-RTO: 17.3 FL (ref 11.5–15)
PEEP/CPAP: 8 CMH2O
PEEP/CPAP: 8 CMH2O
PFO2: 3.41 MMHG/%
PFO2: 3.63 MMHG/%
PH BLOOD GAS: 7.44 (ref 7.35–7.45)
PH BLOOD GAS: 7.44 (ref 7.35–7.45)
PH UA: 8.5 (ref 5–9)
PHENCYCLIDINE SCREEN URINE: NOT DETECTED
PHOSPHORUS: 3.9 MG/DL (ref 2.5–4.5)
PHOSPHORUS: 4.1 MG/DL (ref 2.5–4.5)
PLATELET # BLD: 244 E9/L (ref 130–450)
PLATELET # BLD: 278 E9/L (ref 130–450)
PMV BLD AUTO: 10.3 FL (ref 7–12)
PMV BLD AUTO: 10.4 FL (ref 7–12)
PO2: 136.5 MMHG (ref 75–100)
PO2: 145.4 MMHG (ref 75–100)
POIKILOCYTES: ABNORMAL
POLYCHROMASIA: ABNORMAL
POTASSIUM SERPL-SCNC: 4.7 MMOL/L (ref 3.5–5)
POTASSIUM SERPL-SCNC: 4.7 MMOL/L (ref 3.5–5)
POTASSIUM SERPL-SCNC: 6 MMOL/L (ref 3.5–5)
PROCALCITONIN: 18.88 NG/ML (ref 0–0.08)
PROTEIN UA: >=300 MG/DL
RBC # BLD: 2.8 E12/L (ref 3.5–5.5)
RBC # BLD: 3.03 E12/L (ref 3.5–5.5)
RBC UA: ABNORMAL /HPF (ref 0–2)
RESPIRATORY SYNCYTIAL VIRUS BY PCR: NOT DETECTED
RI(T): 0.64
RI(T): 0.72
RR MECHANICAL: 12 B/MIN
RR MECHANICAL: 12 B/MIN
SALICYLATE, SERUM: <0.3 MG/DL (ref 0–30)
SARS-COV-2, PCR: NOT DETECTED
SODIUM BLD-SCNC: 132 MMOL/L (ref 132–146)
SODIUM BLD-SCNC: 134 MMOL/L (ref 132–146)
SODIUM BLD-SCNC: 136 MMOL/L (ref 132–146)
SOURCE, BLOOD GAS: ABNORMAL
SOURCE, BLOOD GAS: ABNORMAL
SPECIFIC GRAVITY UA: 1.01 (ref 1–1.03)
T4 FREE: 1.1 NG/DL (ref 0.93–1.7)
TARGET CELLS: ABNORMAL
THB: 8.3 G/DL (ref 11.5–16.5)
THB: 9.3 G/DL (ref 11.5–16.5)
TIME ANALYZED: 224
TIME ANALYZED: 701
TOTAL IRON BINDING CAPACITY: 180 MCG/DL (ref 250–450)
TOTAL PROTEIN: 6.2 G/DL (ref 6.4–8.3)
TOTAL PROTEIN: 6.6 G/DL (ref 6.4–8.3)
TRICYCLIC ANTIDEPRESSANTS SCREEN SERUM: NEGATIVE NG/ML
TSH SERPL DL<=0.05 MIU/L-ACNC: 5.37 UIU/ML (ref 0.27–4.2)
UROBILINOGEN, URINE: 0.2 E.U./DL
VT MECHANICAL: 350 ML
VT MECHANICAL: 350 ML
WBC # BLD: 6 E9/L (ref 4.5–11.5)
WBC # BLD: 8 E9/L (ref 4.5–11.5)
WBC UA: >20 /HPF (ref 0–5)

## 2022-03-28 PROCEDURE — 36592 COLLECT BLOOD FROM PICC: CPT

## 2022-03-28 PROCEDURE — 80179 DRUG ASSAY SALICYLATE: CPT

## 2022-03-28 PROCEDURE — 82962 GLUCOSE BLOOD TEST: CPT

## 2022-03-28 PROCEDURE — 82140 ASSAY OF AMMONIA: CPT

## 2022-03-28 PROCEDURE — 87070 CULTURE OTHR SPECIMN AEROBIC: CPT

## 2022-03-28 PROCEDURE — 2500000003 HC RX 250 WO HCPCS: Performed by: INTERNAL MEDICINE

## 2022-03-28 PROCEDURE — 36415 COLL VENOUS BLD VENIPUNCTURE: CPT

## 2022-03-28 PROCEDURE — 80143 DRUG ASSAY ACETAMINOPHEN: CPT

## 2022-03-28 PROCEDURE — 99254 IP/OBS CNSLTJ NEW/EST MOD 60: CPT | Performed by: INTERNAL MEDICINE

## 2022-03-28 PROCEDURE — 6360000002 HC RX W HCPCS: Performed by: INTERNAL MEDICINE

## 2022-03-28 PROCEDURE — 85014 HEMATOCRIT: CPT

## 2022-03-28 PROCEDURE — 87040 BLOOD CULTURE FOR BACTERIA: CPT

## 2022-03-28 PROCEDURE — 6360000002 HC RX W HCPCS: Performed by: NURSE PRACTITIONER

## 2022-03-28 PROCEDURE — 84681 ASSAY OF C-PEPTIDE: CPT

## 2022-03-28 PROCEDURE — 82077 ASSAY SPEC XCP UR&BREATH IA: CPT

## 2022-03-28 PROCEDURE — 82805 BLOOD GASES W/O2 SATURATION: CPT

## 2022-03-28 PROCEDURE — 0202U NFCT DS 22 TRGT SARS-COV-2: CPT

## 2022-03-28 PROCEDURE — 84100 ASSAY OF PHOSPHORUS: CPT

## 2022-03-28 PROCEDURE — 82533 TOTAL CORTISOL: CPT

## 2022-03-28 PROCEDURE — 82088 ASSAY OF ALDOSTERONE: CPT

## 2022-03-28 PROCEDURE — 83540 ASSAY OF IRON: CPT

## 2022-03-28 PROCEDURE — 93010 ELECTROCARDIOGRAM REPORT: CPT | Performed by: INTERNAL MEDICINE

## 2022-03-28 PROCEDURE — 71045 X-RAY EXAM CHEST 1 VIEW: CPT

## 2022-03-28 PROCEDURE — 80307 DRUG TEST PRSMV CHEM ANLYZR: CPT

## 2022-03-28 PROCEDURE — 80048 BASIC METABOLIC PNL TOTAL CA: CPT

## 2022-03-28 PROCEDURE — 5A1D70Z PERFORMANCE OF URINARY FILTRATION, INTERMITTENT, LESS THAN 6 HOURS PER DAY: ICD-10-PCS | Performed by: INTERNAL MEDICINE

## 2022-03-28 PROCEDURE — 81001 URINALYSIS AUTO W/SCOPE: CPT

## 2022-03-28 PROCEDURE — 85018 HEMOGLOBIN: CPT

## 2022-03-28 PROCEDURE — 85025 COMPLETE CBC W/AUTO DIFF WBC: CPT

## 2022-03-28 PROCEDURE — 6370000000 HC RX 637 (ALT 250 FOR IP): Performed by: INTERNAL MEDICINE

## 2022-03-28 PROCEDURE — 94003 VENT MGMT INPAT SUBQ DAY: CPT

## 2022-03-28 PROCEDURE — 82330 ASSAY OF CALCIUM: CPT

## 2022-03-28 PROCEDURE — 84439 ASSAY OF FREE THYROXINE: CPT

## 2022-03-28 PROCEDURE — A4216 STERILE WATER/SALINE, 10 ML: HCPCS | Performed by: INTERNAL MEDICINE

## 2022-03-28 PROCEDURE — 2580000003 HC RX 258: Performed by: INTERNAL MEDICINE

## 2022-03-28 PROCEDURE — 87081 CULTURE SCREEN ONLY: CPT

## 2022-03-28 PROCEDURE — 84443 ASSAY THYROID STIM HORMONE: CPT

## 2022-03-28 PROCEDURE — 83550 IRON BINDING TEST: CPT

## 2022-03-28 PROCEDURE — G0480 DRUG TEST DEF 1-7 CLASSES: HCPCS

## 2022-03-28 PROCEDURE — 83525 ASSAY OF INSULIN: CPT

## 2022-03-28 PROCEDURE — 2000000000 HC ICU R&B

## 2022-03-28 PROCEDURE — 83605 ASSAY OF LACTIC ACID: CPT

## 2022-03-28 PROCEDURE — 82728 ASSAY OF FERRITIN: CPT

## 2022-03-28 PROCEDURE — 84244 ASSAY OF RENIN: CPT

## 2022-03-28 PROCEDURE — 99223 1ST HOSP IP/OBS HIGH 75: CPT | Performed by: INTERNAL MEDICINE

## 2022-03-28 PROCEDURE — 84145 PROCALCITONIN (PCT): CPT

## 2022-03-28 PROCEDURE — 83735 ASSAY OF MAGNESIUM: CPT

## 2022-03-28 PROCEDURE — 94640 AIRWAY INHALATION TREATMENT: CPT

## 2022-03-28 PROCEDURE — 80076 HEPATIC FUNCTION PANEL: CPT

## 2022-03-28 PROCEDURE — 90935 HEMODIALYSIS ONE EVALUATION: CPT

## 2022-03-28 PROCEDURE — 87206 SMEAR FLUORESCENT/ACID STAI: CPT

## 2022-03-28 PROCEDURE — C9113 INJ PANTOPRAZOLE SODIUM, VIA: HCPCS | Performed by: INTERNAL MEDICINE

## 2022-03-28 PROCEDURE — 6370000000 HC RX 637 (ALT 250 FOR IP): Performed by: NURSE PRACTITIONER

## 2022-03-28 RX ORDER — ROPINIROLE 0.25 MG/1
0.25 TABLET, FILM COATED ORAL NIGHTLY
Status: DISCONTINUED | OUTPATIENT
Start: 2022-03-28 | End: 2022-04-25 | Stop reason: HOSPADM

## 2022-03-28 RX ORDER — ARFORMOTEROL TARTRATE 15 UG/2ML
15 SOLUTION RESPIRATORY (INHALATION) 2 TIMES DAILY
Status: DISCONTINUED | OUTPATIENT
Start: 2022-03-28 | End: 2022-03-28

## 2022-03-28 RX ORDER — ACETYLCYSTEINE 200 MG/ML
600 SOLUTION ORAL; RESPIRATORY (INHALATION) 2 TIMES DAILY
Status: DISCONTINUED | OUTPATIENT
Start: 2022-03-28 | End: 2022-03-28

## 2022-03-28 RX ORDER — HEPARIN SODIUM 10000 [USP'U]/ML
5000 INJECTION, SOLUTION INTRAVENOUS; SUBCUTANEOUS EVERY 8 HOURS
Status: DISCONTINUED | OUTPATIENT
Start: 2022-03-28 | End: 2022-03-29

## 2022-03-28 RX ORDER — SODIUM CHLORIDE 0.9 % (FLUSH) 0.9 %
5-40 SYRINGE (ML) INJECTION EVERY 12 HOURS SCHEDULED
Status: DISCONTINUED | OUTPATIENT
Start: 2022-03-28 | End: 2022-03-31 | Stop reason: SDUPTHER

## 2022-03-28 RX ORDER — SODIUM CHLORIDE 0.9 % (FLUSH) 0.9 %
SYRINGE (ML) INJECTION
Status: DISPENSED
Start: 2022-03-28 | End: 2022-03-28

## 2022-03-28 RX ORDER — ACETAMINOPHEN 650 MG/1
650 SUPPOSITORY RECTAL EVERY 6 HOURS PRN
Status: DISCONTINUED | OUTPATIENT
Start: 2022-03-28 | End: 2022-04-11

## 2022-03-28 RX ORDER — DEXTROSE AND SODIUM CHLORIDE 5; .45 G/100ML; G/100ML
INJECTION, SOLUTION INTRAVENOUS CONTINUOUS
Status: DISCONTINUED | OUTPATIENT
Start: 2022-03-28 | End: 2022-03-29

## 2022-03-28 RX ORDER — CHLORHEXIDINE GLUCONATE 0.12 MG/ML
15 RINSE ORAL 2 TIMES DAILY
Status: DISCONTINUED | OUTPATIENT
Start: 2022-03-28 | End: 2022-03-30

## 2022-03-28 RX ORDER — LEVOTHYROXINE SODIUM 88 UG/1
88 TABLET ORAL DAILY
Status: DISCONTINUED | OUTPATIENT
Start: 2022-03-29 | End: 2022-04-25 | Stop reason: HOSPADM

## 2022-03-28 RX ORDER — DEXTROSE MONOHYDRATE 25 G/50ML
25 INJECTION, SOLUTION INTRAVENOUS ONCE
Status: DISCONTINUED | OUTPATIENT
Start: 2022-03-28 | End: 2022-03-28

## 2022-03-28 RX ORDER — DEXTROSE MONOHYDRATE 50 MG/ML
100 INJECTION, SOLUTION INTRAVENOUS PRN
Status: DISCONTINUED | OUTPATIENT
Start: 2022-03-28 | End: 2022-04-25 | Stop reason: HOSPADM

## 2022-03-28 RX ORDER — SODIUM CHLORIDE 9 MG/ML
25 INJECTION, SOLUTION INTRAVENOUS PRN
Status: DISCONTINUED | OUTPATIENT
Start: 2022-03-28 | End: 2022-03-28

## 2022-03-28 RX ORDER — LABETALOL HYDROCHLORIDE 5 MG/ML
5 INJECTION, SOLUTION INTRAVENOUS EVERY 6 HOURS PRN
Status: DISCONTINUED | OUTPATIENT
Start: 2022-03-28 | End: 2022-04-16

## 2022-03-28 RX ORDER — GUAIFENESIN 400 MG/1
400 TABLET ORAL 3 TIMES DAILY
Status: DISCONTINUED | OUTPATIENT
Start: 2022-03-28 | End: 2022-03-28

## 2022-03-28 RX ORDER — DEXTROSE MONOHYDRATE 100 MG/ML
INJECTION, SOLUTION INTRAVENOUS CONTINUOUS
Status: DISCONTINUED | OUTPATIENT
Start: 2022-03-28 | End: 2022-03-28

## 2022-03-28 RX ORDER — 0.9 % SODIUM CHLORIDE 0.9 %
1000 INTRAVENOUS SOLUTION INTRAVENOUS ONCE
Status: COMPLETED | OUTPATIENT
Start: 2022-03-28 | End: 2022-03-28

## 2022-03-28 RX ORDER — FAMOTIDINE 20 MG/1
10 TABLET, FILM COATED ORAL DAILY
Status: DISCONTINUED | OUTPATIENT
Start: 2022-03-29 | End: 2022-04-13

## 2022-03-28 RX ORDER — ARIPIPRAZOLE 5 MG/1
5 TABLET ORAL NIGHTLY
Status: DISCONTINUED | OUTPATIENT
Start: 2022-03-28 | End: 2022-04-25 | Stop reason: HOSPADM

## 2022-03-28 RX ORDER — LEVOTHYROXINE SODIUM 0.07 MG/1
75 TABLET ORAL DAILY
Status: DISCONTINUED | OUTPATIENT
Start: 2022-03-28 | End: 2022-03-28

## 2022-03-28 RX ORDER — POLYETHYLENE GLYCOL 3350 17 G/17G
17 POWDER, FOR SOLUTION ORAL DAILY PRN
Status: DISCONTINUED | OUTPATIENT
Start: 2022-03-28 | End: 2022-04-25 | Stop reason: HOSPADM

## 2022-03-28 RX ORDER — ONDANSETRON 4 MG/1
4 TABLET, ORALLY DISINTEGRATING ORAL EVERY 8 HOURS PRN
Status: DISCONTINUED | OUTPATIENT
Start: 2022-03-28 | End: 2022-03-28

## 2022-03-28 RX ORDER — ALBUTEROL SULFATE 2.5 MG/3ML
2.5 SOLUTION RESPIRATORY (INHALATION) EVERY 6 HOURS PRN
Status: DISCONTINUED | OUTPATIENT
Start: 2022-03-28 | End: 2022-04-25 | Stop reason: HOSPADM

## 2022-03-28 RX ORDER — SODIUM CHLORIDE 0.9 % (FLUSH) 0.9 %
5-40 SYRINGE (ML) INJECTION PRN
Status: DISCONTINUED | OUTPATIENT
Start: 2022-03-28 | End: 2022-03-31 | Stop reason: SDUPTHER

## 2022-03-28 RX ORDER — VITAMIN B COMPLEX
1000 TABLET ORAL DAILY
Status: DISCONTINUED | OUTPATIENT
Start: 2022-03-28 | End: 2022-04-25 | Stop reason: HOSPADM

## 2022-03-28 RX ORDER — MINERAL OIL AND WHITE PETROLATUM 150; 830 MG/G; MG/G
OINTMENT OPHTHALMIC EVERY 6 HOURS
Status: DISCONTINUED | OUTPATIENT
Start: 2022-03-28 | End: 2022-03-30

## 2022-03-28 RX ORDER — ACETAMINOPHEN 325 MG/1
650 TABLET ORAL EVERY 6 HOURS PRN
Status: DISCONTINUED | OUTPATIENT
Start: 2022-03-28 | End: 2022-04-11

## 2022-03-28 RX ORDER — MIRTAZAPINE 15 MG/1
15 TABLET, FILM COATED ORAL NIGHTLY
Status: DISCONTINUED | OUTPATIENT
Start: 2022-03-28 | End: 2022-04-20

## 2022-03-28 RX ORDER — ONDANSETRON 2 MG/ML
4 INJECTION INTRAMUSCULAR; INTRAVENOUS EVERY 6 HOURS PRN
Status: DISCONTINUED | OUTPATIENT
Start: 2022-03-28 | End: 2022-03-28

## 2022-03-28 RX ORDER — ESCITALOPRAM OXALATE 10 MG/1
10 TABLET ORAL DAILY
Status: DISCONTINUED | OUTPATIENT
Start: 2022-03-28 | End: 2022-04-25 | Stop reason: HOSPADM

## 2022-03-28 RX ORDER — NICOTINE POLACRILEX 4 MG
15 LOZENGE BUCCAL PRN
Status: DISCONTINUED | OUTPATIENT
Start: 2022-03-28 | End: 2022-04-14 | Stop reason: SDUPTHER

## 2022-03-28 RX ORDER — SODIUM POLYSTYRENE SULFONATE 15 G/60ML
15 SUSPENSION ORAL; RECTAL ONCE
Status: DISCONTINUED | OUTPATIENT
Start: 2022-03-28 | End: 2022-03-30

## 2022-03-28 RX ORDER — DEXTROSE MONOHYDRATE 25 G/50ML
12.5 INJECTION, SOLUTION INTRAVENOUS PRN
Status: DISCONTINUED | OUTPATIENT
Start: 2022-03-28 | End: 2022-04-25 | Stop reason: HOSPADM

## 2022-03-28 RX ORDER — COSYNTROPIN 0.25 MG/ML
250 INJECTION, POWDER, FOR SOLUTION INTRAMUSCULAR; INTRAVENOUS ONCE
Status: COMPLETED | OUTPATIENT
Start: 2022-03-29 | End: 2022-03-29

## 2022-03-28 RX ADMIN — SODIUM CHLORIDE, PRESERVATIVE FREE 10 ML: 5 INJECTION INTRAVENOUS at 19:53

## 2022-03-28 RX ADMIN — GUAIFENESIN 400 MG: 400 TABLET ORAL at 14:22

## 2022-03-28 RX ADMIN — EPOETIN ALFA-EPBX 3000 UNITS: 3000 INJECTION, SOLUTION INTRAVENOUS; SUBCUTANEOUS at 16:15

## 2022-03-28 RX ADMIN — SODIUM CHLORIDE 40 MG: 9 INJECTION INTRAMUSCULAR; INTRAVENOUS; SUBCUTANEOUS at 08:19

## 2022-03-28 RX ADMIN — PIPERACILLIN AND TAZOBACTAM 3375 MG: 3; .375 INJECTION, POWDER, LYOPHILIZED, FOR SOLUTION INTRAVENOUS at 20:03

## 2022-03-28 RX ADMIN — MINERAL OIL AND WHITE PETROLATUM: 150; 830 OINTMENT OPHTHALMIC at 08:20

## 2022-03-28 RX ADMIN — Medication 25 MCG/HR: at 04:17

## 2022-03-28 RX ADMIN — Medication 12.5 G: at 03:58

## 2022-03-28 RX ADMIN — METRONIDAZOLE 500 MG: 500 INJECTION, SOLUTION INTRAVENOUS at 11:46

## 2022-03-28 RX ADMIN — LEVOTHYROXINE SODIUM 75 MCG: 0.07 TABLET ORAL at 08:20

## 2022-03-28 RX ADMIN — GUAIFENESIN 400 MG: 400 TABLET ORAL at 08:20

## 2022-03-28 RX ADMIN — CALCIUM GLUCONATE 1000 MG: 94 INJECTION, SOLUTION INTRAVENOUS at 06:37

## 2022-03-28 RX ADMIN — ARIPIPRAZOLE 5 MG: 5 TABLET ORAL at 19:52

## 2022-03-28 RX ADMIN — ESCITALOPRAM 10 MG: 10 TABLET, FILM COATED ORAL at 08:20

## 2022-03-28 RX ADMIN — MINERAL OIL AND WHITE PETROLATUM: 150; 830 OINTMENT OPHTHALMIC at 13:25

## 2022-03-28 RX ADMIN — 0.12% CHLORHEXIDINE GLUCONATE 15 ML: 1.2 RINSE ORAL at 19:52

## 2022-03-28 RX ADMIN — MINERAL OIL AND WHITE PETROLATUM: 150; 830 OINTMENT OPHTHALMIC at 05:42

## 2022-03-28 RX ADMIN — HEPARIN SODIUM 5000 UNITS: 10000 INJECTION INTRAVENOUS; SUBCUTANEOUS at 05:42

## 2022-03-28 RX ADMIN — PIPERACILLIN AND TAZOBACTAM 3375 MG: 3; .375 INJECTION, POWDER, LYOPHILIZED, FOR SOLUTION INTRAVENOUS at 08:24

## 2022-03-28 RX ADMIN — HEPARIN SODIUM 5000 UNITS: 10000 INJECTION INTRAVENOUS; SUBCUTANEOUS at 13:25

## 2022-03-28 RX ADMIN — Medication 1000 UNITS: at 16:15

## 2022-03-28 RX ADMIN — DEXTROSE MONOHYDRATE: 100 INJECTION, SOLUTION INTRAVENOUS at 04:12

## 2022-03-28 RX ADMIN — ROPINIROLE 0.25 MG: 0.25 TABLET, FILM COATED ORAL at 19:52

## 2022-03-28 RX ADMIN — METRONIDAZOLE 500 MG: 500 INJECTION, SOLUTION INTRAVENOUS at 20:01

## 2022-03-28 RX ADMIN — MINERAL OIL AND WHITE PETROLATUM: 150; 830 OINTMENT OPHTHALMIC at 19:53

## 2022-03-28 RX ADMIN — SODIUM CHLORIDE 1000 ML: 9 INJECTION, SOLUTION INTRAVENOUS at 03:52

## 2022-03-28 RX ADMIN — DEXTROSE AND SODIUM CHLORIDE: 5; 450 INJECTION, SOLUTION INTRAVENOUS at 16:09

## 2022-03-28 RX ADMIN — ARFORMOTEROL TARTRATE 15 MCG: 15 SOLUTION RESPIRATORY (INHALATION) at 10:35

## 2022-03-28 RX ADMIN — ACETYLCYSTEINE 600 MG: 200 SOLUTION ORAL; RESPIRATORY (INHALATION) at 10:35

## 2022-03-28 RX ADMIN — HYDROCORTISONE SODIUM SUCCINATE 100 MG: 100 INJECTION, POWDER, FOR SOLUTION INTRAMUSCULAR; INTRAVENOUS at 17:25

## 2022-03-28 RX ADMIN — HEPARIN SODIUM 5000 UNITS: 10000 INJECTION INTRAVENOUS; SUBCUTANEOUS at 21:54

## 2022-03-28 RX ADMIN — MIRTAZAPINE 15 MG: 15 TABLET, FILM COATED ORAL at 19:52

## 2022-03-28 RX ADMIN — HYDROCORTISONE SODIUM SUCCINATE 100 MG: 100 INJECTION, POWDER, FOR SOLUTION INTRAMUSCULAR; INTRAVENOUS at 10:29

## 2022-03-28 RX ADMIN — 0.12% CHLORHEXIDINE GLUCONATE 15 ML: 1.2 RINSE ORAL at 08:19

## 2022-03-28 ASSESSMENT — PULMONARY FUNCTION TESTS
PIF_VALUE: 23
PIF_VALUE: 19
PIF_VALUE: 29
PIF_VALUE: 23
PIF_VALUE: 23
PIF_VALUE: 19
PIF_VALUE: 28
PIF_VALUE: 19
PIF_VALUE: 26
PIF_VALUE: 23
PIF_VALUE: 19
PIF_VALUE: 18
PIF_VALUE: 29
PIF_VALUE: 19
PIF_VALUE: 27
PIF_VALUE: 24
PIF_VALUE: 22
PIF_VALUE: 22
PIF_VALUE: 21
PIF_VALUE: 25
PIF_VALUE: 27
PIF_VALUE: 23
PIF_VALUE: 22
PIF_VALUE: 24
PIF_VALUE: 19
PIF_VALUE: 21
PIF_VALUE: 27
PIF_VALUE: 22

## 2022-03-28 ASSESSMENT — PAIN SCALES - GENERAL
PAINLEVEL_OUTOF10: 0
PAINLEVEL_OUTOF10: 3
PAINLEVEL_OUTOF10: 0
PAINLEVEL_OUTOF10: 0

## 2022-03-28 NOTE — PROGRESS NOTES
Patient admitted from ed. Patient is sedated, intubated. Not responding to stimuli at this time. Tesio catheter right chest, intact. Tlc intact right femoral vein. Resident notified of admission. Unable to obtain information from patient at this time.

## 2022-03-28 NOTE — PROGRESS NOTES
[facility]  [facility] Residency Program  Progress Note - House Team     Patient:  Paul Cruz 36 y.o. female MRN: [id number]     Date of Service: 3/28/2022     CC: AMS  Overnight events: NAEO    Subjective     Patient was seen and examined this morning at bedside in no acute distress. Overnight no acute events documented. VSS stable, afebrile. Patient remains intubated and sedated. During assessment today, patient receiving HD. Despite minimal sedation, patient is not arousable. Brainstem reflexes remain intact. Overnight, patient again became hypoglycemic with glucose dropping to 45. Patient was started on D10. No other acute complaints this time. Objective     Physical Exam:  · Vitals: /73   Pulse 92   Temp 97.3 °F (36.3 °C) (Infrared)   Resp 16   Ht 5' 4\" (1.626 m)   Wt 138 lb (62.6 kg)   SpO2 100%   BMI 23.69 kg/m²     · I & O - 24hr: I/O this shift:  · In: 0   · Out: 1330 [Urine:30]   · General Appearance: Sedated, intubated  · HEENT:  Head: Normocephalic, no lesions, without obvious abnormality. · Neck: no adenopathy, no carotid bruit, no JVD, supple, symmetrical, trachea midline and thyroid not enlarged, symmetric, no tenderness/mass/nodules  · Lung: clear to auscultation bilaterally  · Heart: regular rate and rhythm, S1, S2 normal, no murmur, click, rub or gallop  · Abdomen: soft, non-tender; bowel sounds normal; no masses,  no organomegaly  · Extremities:  extremities normal, atraumatic, no cyanosis or edema  · Musculokeletal: No joint swelling, no muscle tenderness. ROM normal in all joints of extremities. · Neurologic: Mental status: Sedated; grossly nonfocal, brainstem reflexes intact.   Subject  Pertinent Labs & Imaging Studies   jorge alberto  CBC with Differential:    Lab Results   Component Value Date    WBC 6.0 03/28/2022    RBC 2.80 03/28/2022    HGB 7.5 03/28/2022    HCT 22.9 03/28/2022     03/28/2022    MCV 81.8 03/28/2022    MCH 26.8 03/28/2022    MCHC 32.8 03/28/2022    RDW 17.2 03/28/2022    NRBC 0.9 03/28/2022    SEGSPCT 67 06/27/2013    METASPCT 1.7 08/10/2020    LYMPHOPCT 19.1 03/28/2022    MONOPCT 2.6 03/28/2022    MYELOPCT 0.9 01/19/2022    BASOPCT 1.0 03/28/2022    MONOSABS 0.18 03/28/2022    LYMPHSABS 1.14 03/28/2022    EOSABS 0.11 03/28/2022    BASOSABS 0.00 03/28/2022     BMP:    Lab Results   Component Value Date     03/28/2022    K 4.7 03/28/2022    K 3.7 03/04/2022     03/28/2022    CO2 22 03/28/2022    BUN 38 03/28/2022    LABALBU 2.1 03/28/2022    LABALBU 4.1 05/18/2012    CREATININE 4.7 03/28/2022    CALCIUM 7.9 03/28/2022    GFRAA 13 03/28/2022    LABGLOM 13 03/28/2022    GLUCOSE 92 03/28/2022    GLUCOSE 130 05/18/2012       XR CHEST PORTABLE   Final Result   Infiltrate and or atelectasis at the left lower lobe. Substantially resolved   infiltrate and or atelectasis on the right. New NG tube. CT HEAD WO CONTRAST   Final Result   No acute intracranial abnormality or hemorrhage. CT ABDOMEN PELVIS WO CONTRAST Additional Contrast? None   Final Result   1. Moderate ascites throughout abdomen and pelvis. 2.  Multiple thick walled loops of small bowel throughout the abdomen could   suggest nonspecific infectious or inflammatory enteritis. 3.  Generalized body wall edema. 4.  Small bilateral pleural effusions with adjacent atelectatic changes. XR ABDOMEN FOR NG/OG/NE TUBE PLACEMENT   Final Result   Enteric tube tip in the body of the stomach. XR CHEST PORTABLE   Final Result   Right perihilar opacity. XR CHEST PORTABLE    (Results Pending)   XR CHEST PORTABLE    (Results Pending)   XR CHEST PORTABLE    (Results Pending)        Resident's Assessment and Plan     Assessment:    1. Acute metabolic encephalopathy-likely due to hypoglycemia in setting of DM1 and ESRD (CTH negative, negative UDS/SDS, NH3 normal, B12 normal 1 month ago)  2. HTN  3.  Acute hypoxic respiratory failure  4. Aspiration pneumonia  5. ESRD on HD TTS  6. IDDM1-A1c 7.7% this admission  7. Hypothyroidism-TSH 5.37, FT4 1.10 normal  8. Acute on chronic anemia-s/p 1 unit PRBCs, chronic component likely due to ESRD  9. History of MDRO UTI  10.  History of C. difficile infection      Plan:     Wean sedation   Continue D10 drip for hypoglycemia   Cosyntropin stim test ordered   Check serum insulin and sulfonylurea levels   Increase Synthroid to 88 mcg daily   Endo on consult, input appreciated   Consider MRI brain if no improvement given persistent hypoglycemia   Zosyn and Flagyl per ID   HD per nephro   Continue Abilify and Celexa      PT/OT evaluation: Not appropriate  DVT prophylaxis/ GI prophylaxis: Heparin/Protonix  Disposition: continue current care    Shireen Duverney, MD, PGY-3  Attending physician: Dr. Lizzie Hoover

## 2022-03-28 NOTE — PROGRESS NOTES
03/27/22 2155   Vent Information   Skin Assessment Clean, dry, & intact   Equipment -12   Vent Type 980   Vent Mode AC/VC   Vt Ordered 350 mL   Rate Set 12 bmp   Peak Flow 60 L/min   Pressure Support 0 cmH20   FiO2  50 %   SpO2 100 %   SpO2/FiO2 ratio 200   Sensitivity 3   PEEP/CPAP 8   I Time/ I Time % 0 s   Humidification Source Heated wire   Humidification Temp 37   Humidification Temp Measured 37   Circuit Condensation Drained   Vent Patient Data   High Peep/I Pressure 0   Peak Inspiratory Pressure 29 cmH2O   Decreased patient to RR 12 350 40% per order

## 2022-03-28 NOTE — H&P
[address]  Internal Medicine Residency Program  History and Physical    Patient:  Rahat Colbert 36 y.o. female MRN: [id number]     Date of Service: 3/27/2022    Hospital Day: 1      Chief complaint: had concerns including Hypoglycemia (Blood sugar low at the NH, patient found unresponsive. ). History of Present Illness   The patient is a 36 y.o. female with a past medical history of type 1 diabetes mellitus with multiple episodes of hypoglycemia, chronic C. difficile, end-stage renal disease on dialysis 3 times a week, cardiac arrest, anemia of chronic disease, septic emboli with endocarditis presents to the hospital after being found unresponsive at a facility. Patient was noted to be in her normal state of health with last known well time of around 11 AM today at which time her sugars were 197. In the afternoon staff at her nursing facility found the patient to be very lethargic and unresponsive sugar was tried to be checked however was read too low to be found. Initially tried treating patient with glucose and glucagon at facility however unable for patient to tolerate therapy. EMS was called and patient had IV placed and started on D5 infusion. Patient was then brought to the ED. In the ED patient was still found to be unresponsive she was intubated for airway protection at that time. Her sugars were found to be 157 at time of admission to the ED. Is a concern for stroke and seizure in the ED as such CT of scan of the head is to be done. Troponin and proBNP were also found to be elevated in the ED however these numbers appear close to her baseline. Also concern for aspiration pneumonitis and patient. Patient will be admitted for ICU level care be followed by internal medicine.     Past Medical History:      Diagnosis Date    Acute congestive heart failure ([address]     BC (acute kidney injury) ([facility] 10/01/2019    Cardiac arrest ([facility] 02/15/2021    Cephalgia 10/09/2019    Chronic kidney disease     Depression     Diabetes mellitus ([address]     Diabetic gastroparesis associated with type 1 diabetes mellitus ([address] 2018    Diabetic ketoacidosis ([address] 2011    Diabetic ketoacidosis with coma associated with type 1 diabetes mellitus ([address] 2013    Diabetic polyneuropathy associated with type 1 diabetes mellitus ([address] 2020    Drug use complicating pregnancy in third trimester     Endocarditis 10/31/2020    ESRD (end stage renal disease) ([address] 2020    H/O cardiovascular stress test 2021    Lexiscan    Hemodialysis patient [facility])     History of blood transfusion 2019    Hyperlipidemia 10/08/2020    Hyperosmolar hyperglycemic state (HHS) ([address] 2020    Hypothyroidism 10/08/2020    Iron deficiency anemia 10/01/2019    MDRO (multiple drug resistant organisms) resistance     MRSA (methicillin resistant Staphylococcus aureus)     back wound abcess    Non compliance w medication regimen 2016    Other disorders of kidney and ureter     Pregnancy 2016    16 weeks    Previous  delivery affecting pregnancy, antepartum 2017    Previous stillbirth or demise, antepartum 2016    Seizure ([address] 2020    Severe pre-eclampsia in third trimester 2016    Shock liver 02/15/2021    Valvular endocarditis 11/10/2020    This Diagnosis was added to the Problem List based on transcribed orders from Dr. Kevin Rodriguez          Past Surgical History:        Procedure Laterality Date    BACK SURGERY      abscess    CATHETER REMOVAL N/A 10/1/2021    PERITONEAL CATHETER REMOVAL performed by Meghann Stanley MD at [facility] 49      x2   [facility], LAPAROSCOPIC N/A 2019    CHOLECYSTECTOMY LAPAROSCOPIC performed by Rocio Lowery MD at [address] [place] [address] N/A 2018    COLONOSCOPY WITH BIOPSY performed by Ossie Rinne, MD at [facility] N/A 2018 COLONOSCOPY WITH BIOPSY performed by Helga Colón MD at [address]  1/31/2012    EF 57%    ECHO COMPL W DOP COLOR FLOW  6/10/2013         EMBOLECTOMY N/A 11/6/2020    [facility], [address] [facility], [place] -- REQS ROOM 3 performed by Anna Huerta MD at [facility] [facility] Left 2/23/2021    LEFT LEG INCISION AND DRAINAGE, DEBRIDEMENT, WOUND VAC APPLICATION performed by Diego Reed DPM at [address] Left 5/18/2021    LEFT LEG DEBRIDEMENT BIOPSY POSS APPLICATION WOUND VAC performed by Diego Reed DPM at [place] [address] N/A 6/26/2020    LAPAROSCOPIC INSERTION PERITONEAL DIALYSIS CATHETER performed by Venida Eisenmenger, MD at [place] [address] ESOPHAGOGASTRODUODENOSCOPY TRANSORAL DIAGNOSTIC N/A 5/30/2018    EGD ESOPHAGOGASTRODUODENOSCOPY performed by Amy Lang MD at [id number] [place] [address] TRANSESOPHAGEAL ECHOCARDIOGRAM N/A 10/19/2020    TRANSESOPHAGEAL ECHOCARDIOGRAM WITH BUBBLE STUDY performed by Mitzi Murillo MD at [address] [facility] [address] [facility]  04/2018    UPPER GASTROINTESTINAL ENDOSCOPY  12/18/2018    EGD BIOPSY performed by Helga Colón MD at [address] [place] [address] 10/11/2019    EGD ESOPHAGOGASTRODUODENOSCOPY performed by Huma Qureshi DO at [address] [place] [address] N/A 7/14/2021    EGD BIOPSY performed by Patrick Pineda MD at [place] [address]       Medications Prior to Admission:    Prior to Admission medications    Medication Sig Start Date End Date Taking?  Authorizing Provider   escitalopram (LEXAPRO) 10 MG tablet Take 1 tablet by mouth daily 3/5/22   Krishna Martinez PA-C   insulin glargine (LANTUS) 100 UNIT/ML injection vial Inject 5 Units into the skin every morning 3/5/22   Krishna Martinez PA-C   Glucagon 3 MG/DOSE POWD by Nasal route See Admin Instructions    Historical Provider, MD   omeprazole (PRILOSEC) 20 MG delayed release capsule Take 20 mg by mouth nightly    Historical Provider, MD   hydrOXYzine (ATARAX) 25 MG tablet Take 25 mg by mouth every 8 hours as needed for Itching    Historical Provider, MD   epoetin nena-epbx (RETACRIT) 87753 UNIT/ML SOLN injection Inject 0.5 mLs into the skin three times a week 2/4/22   Dusty Lopez DO   pregabalin (LYRICA) 50 MG capsule Take 1 capsule by mouth 3 times daily for 30 days. Patient taking differently: Take 50 mg by mouth 2 times daily.   2/3/22 3/5/22  Dusty Lopez DO   sevelamer (RENVELA) 800 MG tablet Take 2 tablets by mouth 3 times daily (with meals)    Historical Provider, MD   insulin lispro (HUMALOG) 100 UNIT/ML injection vial Inject 0-3 Units into the skin 4 times daily (before meals and nightly)  Patient taking differently: Inject 0-10 Units into the skin 3 times daily (before meals)  1/28/22   Mor Manzanares MD   dilTIAZem (CARDIZEM) 90 MG tablet Take 1 tablet by mouth 4 times daily Hold the morning of Hemodialysis  Hold For SBP <100, HR<55 1/28/22   Mor Manzanares MD   cholestyramine Alphonza Daft) 4 g packet Take 1 packet by mouth 2 times daily 1/24/22   Mor Manzanares MD   promethazine (PHENERGAN) 25 MG tablet Take 25 mg by mouth every 6 hours as needed for Nausea    Historical Provider, MD   ondansetron (ZOFRAN) 4 MG tablet Take 4 mg by mouth every 8 hours as needed for Nausea or Vomiting    Historical Provider, MD   albuterol sulfate  (90 Base) MCG/ACT inhaler Inhale 4 puffs into the lungs as needed for Wheezing or Shortness of Breath 1/5/22   Dominic Stover DO   vitamin D (ERGOCALCIFEROL) 1.25 MG (22015 UT) CAPS capsule Take 1 capsule by mouth once a week 9/3/21   Hilda Graf MD   mirtazapine (REMERON) 15 MG tablet Take 15 mg by mouth nightly    Historical Provider, MD   ARIPiprazole (ABILIFY) 5 MG tablet Take 5 mg by mouth nightly 4/18/21   Historical Provider, MD   levothyroxine (SYNTHROID) 75 MCG tablet TAKE 1 TABLET BY MOUTH IN THE MORNING BEFORE BREAKFAST 4/19/21   Jacinda Littlejohn DO   rOPINIRole (REQUIP) 0.25 MG tablet Take 0.25 mg by mouth nightly    Historical Provider, MD       Allergies:  Cefepime and Toradol [ketorolac tromethamine]    Social History:   TOBACCO:   reports that she quit smoking about 13 months ago. Her smoking use included cigarettes. She has a 3.00 pack-year smoking history. She has never used smokeless tobacco.  ETOH:   reports no history of alcohol use. OCCUPATION:      Family History:       Problem Relation Age of Onset   Ardyth Moritz Asthma Mother     Hypertension Mother     High Blood Pressure Mother     Diabetes Mother     Asthma Brother     High Blood Pressure Father        REVIEW OF SYSTEMS:    Review of Systems   Reason unable to perform ROS: Unable to obtainted due to intubation. Physical Exam   · Vitals: BP (!) 176/120   Pulse 107   Temp 98.2 °F (36.8 °C) (Axillary)   Resp 19   Wt 129 lb (58.5 kg)   SpO2 100%   BMI 22.14 kg/m²     Physical Exam  Constitutional:       Interventions: She is sedated and intubated. Eyes:      Pupils: Pupils are equal, round, and reactive to light. Cardiovascular:      Rate and Rhythm: Normal rate and regular rhythm. Pulses: Normal pulses. Heart sounds: Normal heart sounds. No murmur heard. Pulmonary:      Effort: Pulmonary effort is normal. She is intubated. Breath sounds: Examination of the right-upper field reveals rhonchi. Examination of the left-upper field reveals rhonchi. Examination of the right-middle field reveals rhonchi. Examination of the left-middle field reveals rhonchi. Examination of the right-lower field reveals rhonchi. Examination of the left-lower field reveals rhonchi. Rhonchi present. No wheezing. Chest:      Comments: Dialysis catheter in place  Abdominal:      General: Abdomen is flat. There is no distension. Palpations: Abdomen is soft. Tenderness: There is no abdominal tenderness.    Musculoskeletal: General: Normal range of motion. Cervical back: Normal range of motion. Right lower leg: No edema. Left lower leg: No edema. Skin:     General: Skin is warm and dry. Capillary Refill: Capillary refill takes less than 2 seconds. Neurological:      General: No focal deficit present. Mental Status: She is oriented to person, place, and time. Psychiatric:         Mood and Affect: Mood normal.         Behavior: Behavior normal.       Labs and Imaging Studies   Basic Labs  Recent Labs     03/27/22  1832      K 5.0      CO2 25   BUN 38*   CREATININE 4.9*   GLUCOSE 163*   CALCIUM 8.7       Recent Labs     03/27/22  1832   WBC 12.4*   RBC 2.47*   HGB 6.5*   HCT 20.3*   MCV 82.2   MCH 26.3   MCHC 32.0   RDW 18.2*      MPV 9.9         Imaging Studies:  XR ABDOMEN FOR NG/OG/NE TUBE PLACEMENT   Final Result   Enteric tube tip in the body of the stomach. XR CHEST PORTABLE   Final Result   Right perihilar opacity. CT HEAD WO CONTRAST    (Results Pending)   XR CHEST PORTABLE    (Results Pending)   CT ABDOMEN PELVIS WO CONTRAST Additional Contrast? None    (Results Pending)        EKG: sinus tachycardia. Resident's Assessment and Plan     Assessment and Plan:    1.  Acute Metabolic Encephalopathy 2/2 Hypoglycemia vs stroke vs seizure   -Patient known to have multiple hypoglycemic episodes  -At 11 AM on 3/27 patient had glucose of 197, however later in the afternoon while at the facility as patient become unresponsive and glucose was measured too low to be read  -Patient did not respond to treatment at the facility with glucose  -Brought to the hospital patient was found to be unresponsive, intubated for airway protection  -On admission glucose was found to be 159  -Most likely given patient's history this is a hypoglycemic episode  -However there is concern for stroke given patient's history of septic emboli  -We will follow CT scanning of the head  -Monitor blood glucose and treat for hypoglycemia  -Follow mentation off sedation    2. Concern for aspiration pneumonitis  -Unknown if patient aspirated prior to admission, however very rhonchorous on exam  -Patient is afebrile and no white count  -Given patient's history for infections would recommend infectious work-up with pro Yuan, blood cultures, respiratory cultures, Legionella and strep urine  -At this time would recommend empiric treatment with vancomycin and Zosyn    3. Elevated Troponin  -Troponin of 206 on admission  -EKG shows sinus Tachy  -Troponin close to patient baseline however given her history of cardiac issues and ESRD would recommend to trend troponin and repeat EKG in the morning  -Continue to monitor for cardiac symptoms in this patient    4. Pyuria   -UA on admission shows pyuria with leukocyte esterase and moderate bacteria  -Patient is afebrile and WBC of 12.4  -Would recommend empirically treating with vancomycin and Zosyn due to patient's history of infection  -Would recommend obtaining urine culture  -Continue to follow    5. Hx ESRD  -Follows with Dr. Morena Fairchild  -Is on dialysis Tuesday Thursday Saturday  -We will consult nephrology for dialysis  -Follow nephrology recommendations    6. Hx T1DM (very labile)   -Hx of very labile Sugars  -Very sensitive to insulin dosing  -Would recommend starting patient on LDSS   -Would recommend consult to endocrine for management of patients diabetes    7. Hx HTN  -Per nursing home was on Hydralazine and Cardizem while at facility  -SBP of 170-190 on admission  -Would recommend starting Antihypertensive medications, will defer to ICU team    8. Hx chronic C. Diff  -Treated with oral Vancomycin  -Monitor for recurrence      9. Anemia Chronic Disease  -Hx of chronic anemia, baseline Hgb around 10  -On Retacrit  -On admission HgB of 6.5  -Would recommend transfusion for HgB below 7  -Care per ICU team    10.  Hx Endocarditis with Septic Emboli  -Last echo in February 2021 did not show signs of septic emboli  -Echo was normal at that time   -Would recommend ECHO if CT scan concerning for emboli      PT/OT evaluation: Will order when appropriate  DVT prophylaxis/ GI prophylaxis: Per ICU team   Disposition: admit to ICU    Bill Fierro MD, PGY-1  Attending physician: Dr. Ken Nagel

## 2022-03-28 NOTE — CARE COORDINATION
3/28: Transition of care:  Pt was schedules for an outpatient EGD with pyloric dilation & botex injection for diabetic gastroparesis her BS was 588 from [place]. Pt was intubated & transferred to MICU. Pt is in ISO for r/o COVID & Contact for C-Diff. Pt remains intubated, sedated on Fentanyl, Iv [email or web address], Iv flagyl, Iv Solucortef & Iv Zosyn. CM spoke with her mother Janeth Kinney at [phone]. She is uncertain if her daughter would be open to returning to [place]. She feels she will want to return home. Pt's PCP is  & [facility] in [place]. She normally lives with her mother & two sisters in a house with 4 steps to enter. The bed/bathroom are on the 1st floor. She was using a wheelchair & walker at the facility. She was also using 02 PRN. Kari/Anthony is on vacation she will be back tomorrow. Pt will need a pre-cert, PT/OT & negative Covid. [facility] has no preferences for DME/HHC & declined a list. Needs are unclear until pt medically stable. Will need PT/OT/Speech evals. Sw/MICHEL will continue to follow for dc planning. Electronically signed by Tod Rivera RN on 3/28/2022 at 3:45 PM    The Plan for Transition of Care is related to the following treatment goals: SNF/DME/HHC    The Patient and/or patient representative was provided with a choice of provider and agrees   with the discharge plan. [x] Yes [] No    Freedom of choice list was provided with basic dialogue that supports the patient's individualized plan of care/goals, treatment preferences and shares the quality data associated with the providers.  [x] Yes [] No

## 2022-03-28 NOTE — CONSULTS
Department of Internal Medicine  Infectious Diseases   Consult Note      Reason for Consult:  Sepsis     Requesting Physician:  Dr Zeng December:               Pt is known to ID . History was obtained from the pt's mother . Pt is intubated and sedated at present . This is a 36 yrs old female with hx of DM, ESRD on HD , Staph bacteremia ,endocarditis, cardiac arrest , recurrent C diff infection presented to the ER with unresponsiveness and hypoglycemia . There had been no fever, chills, respiratory distress prior to admission . She was intubated .   WBC was 12 K, lactic acid 1.0, procalcitonin 18.08   Chest x ray - infiltrates / atelectasis LLL   Pt was given zosyn and vancomycin IV          Past Medical History:    Past Medical History:   Diagnosis Date    Acute congestive heart failure ([address]     BC (acute kidney injury) ([address] 10/01/2019    Cardiac arrest ([address] 02/15/2021    Cephalgia 10/09/2019    Chronic kidney disease     Depression     Diabetes mellitus ([address]     Diabetic gastroparesis associated with type 1 diabetes mellitus ([address] 12/17/2018    Diabetic ketoacidosis ([address] 08/27/2011    Diabetic ketoacidosis with coma associated with type 1 diabetes mellitus ([address] 06/26/2013    Diabetic polyneuropathy associated with type 1 diabetes mellitus ([address] 12/27/2020    Drug use complicating pregnancy in third trimester     Endocarditis 10/31/2020    ESRD (end stage renal disease) ([address] 09/29/2020    H/O cardiovascular stress test 08/27/2021    Lexiscan    Hemodialysis patient [facility])     History of blood transfusion 11/2019    Hyperlipidemia 10/08/2020    Hyperosmolar hyperglycemic state (HHS) ([address] 11/20/2020    Hypothyroidism 10/08/2020    Iron deficiency anemia 10/01/2019    MDRO (multiple drug resistant organisms) resistance     MRSA (methicillin resistant Staphylococcus aureus)     back wound abcess    Non compliance w medication regimen 03/30/2016    Other disorders of kidney and ureter     Pregnancy 2016    16 weeks    Previous  delivery affecting pregnancy, antepartum 2017    Previous stillbirth or demise, antepartum 2016    Seizure ([address] 2020    Severe pre-eclampsia in third trimester 2016    Shock liver 02/15/2021    Valvular endocarditis 11/10/2020    This Diagnosis was added to the Problem List based on transcribed orders from Dr. Marty Orozco            Past Surgical History:    Past Surgical History:   Procedure Laterality Date    BACK SURGERY      abscess    CATHETER REMOVAL N/A 10/1/2021    PERITONEAL CATHETER REMOVAL performed by Casi Mccall MD at [facility]      x2   [address] [place], LAPAROSCOPIC N/A 2019    CHOLECYSTECTOMY LAPAROSCOPIC performed by Nathalie Tinajero MD at [address] N/A 2018    COLONOSCOPY WITH BIOPSY performed by Fadia Walls MD at [facility] N/A 2018    COLONOSCOPY WITH BIOPSY performed by Nikia Brown MD at [address] [place] [address] Rd  2012    EF 57%    ECHO COMPL W DOP COLOR FLOW  6/10/2013         EMBOLECTOMY N/A 2020    [address] [place] [address], [id number] [place] [address], YULISSA -- REQS ROOM 3 performed by Samy Fernandes MD at [address] [place] [address] Left 2021    LEFT LEG INCISION AND DRAINAGE, DEBRIDEMENT, WOUND VAC APPLICATION performed by Enid Shearer DPM at [address] [place] [address] Left 2021    LEFT LEG DEBRIDEMENT BIOPSY POSS APPLICATION WOUND VAC performed by Enid Shearer DPM at [place] [address] N/A 2020    LAPAROSCOPIC INSERTION PERITONEAL DIALYSIS CATHETER performed by Fidel León MD at [address] [place] [address] ESOPHAGOGASTRODUODENOSCOPY TRANSORAL DIAGNOSTIC N/A 2018    EGD ESOPHAGOGASTRODUODENOSCOPY performed by Fadia Walls MD at [id number] [place] [address] TRANSESOPHAGEAL ECHOCARDIOGRAM N/A 10/19/2020    TRANSESOPHAGEAL ECHOCARDIOGRAM WITH BUBBLE STUDY performed by Ketty Childers MD at [address] [facility] TUNNELED VENOUS PORT PLACEMENT  04/2018    UPPER GASTROINTESTINAL ENDOSCOPY  12/18/2018    EGD BIOPSY performed by Jody Nolasco MD at [address] N/A 10/11/2019    EGD ESOPHAGOGASTRODUODENOSCOPY performed by Beth Ramírez DO at [address] N/A 7/14/2021    EGD BIOPSY performed by Alexsander Askew MD at [facility] ENDOSCOPY       Current Medications:      Current Facility-Administered Medications   Medication Dose Route Frequency Provider Last Rate Last Admin    levothyroxine (SYNTHROID) tablet 75 mcg  75 mcg Oral Daily Jacinda L Littlejohn, DO   75 mcg at 03/28/22 0820    mirtazapine (REMERON) tablet 15 mg  15 mg Oral Nightly Jacinda L Littlejohn, DO        escitalopram (LEXAPRO) tablet 10 mg  10 mg Oral Daily Jacinda L Littlejohn, DO   10 mg at 03/28/22 0820    ARIPiprazole (ABILIFY) tablet 5 mg  5 mg Oral Nightly Jacinda L Littlejohn, DO        rOPINIRole (REQUIP) tablet 0.25 mg  0.25 mg Oral Nightly Jacinda L Littlejohn, DO        sodium chloride flush 0.9 % injection 5-40 mL  5-40 mL IntraVENous 2 times per day Robin Ortegar, DO        sodium chloride flush 0.9 % injection 5-40 mL  5-40 mL IntraVENous PRN Jacinda L Littlejohn, DO        0.9 % sodium chloride infusion  25 mL IntraVENous PRN Jacinda L Littlejohn, DO        ondansetron (ZOFRAN-ODT) disintegrating tablet 4 mg  4 mg Oral Q8H PRN Jacinda L Littlejohn, DO        Or    ondansetron (ZOFRAN) injection 4 mg  4 mg IntraVENous Q6H PRN Jacinda L Littlejohn, DO        polyethylene glycol (GLYCOLAX) packet 17 g  17 g Oral Daily PRN Robin Ortegar, DO        acetaminophen (TYLENOL) tablet 650 mg  650 mg Oral Q6H PRN Jacinda L Littlejohn, DO        Or    acetaminophen (TYLENOL) suppository 650 mg  650 mg Rectal Q6H PRN Jacinda L Littlejohn, DO        piperacillin-tazobactam (ZOSYN) 3,375 mg in dextrose 5 % 100 mL IVPB extended infusion (mini-bag)  3,375 mg IntraVENous Q12H Karen Grates, DO 25 mL/hr at 03/28/22 0824 3,375 mg at 03/28/22 0824    glucose (GLUTOSE) 40 % oral gel 15 g  15 g Oral PRN Jacinda L Littlejohn, DO        dextrose 50 % IV solution  12.5 g IntraVENous PRN Jacinda L Littlejohn, DO   12.5 g at 03/28/22 0358    glucagon (rDNA) injection 1 mg  1 mg IntraMUSCular PRN Jacinda L Littlejohn, DO        dextrose 5 % solution  100 mL/hr IntraVENous PRN Jacinda L Littlejohn, DO        fentaNYL 5 mcg/ml in 0.9%  ml infusion   mcg/hr IntraVENous Continuous Jacinda L Littlejohn, DO 5 mL/hr at 03/28/22 0417 25 mcg/hr at 03/28/22 0417    chlorhexidine (PERIDEX) 0.12 % solution 15 mL  15 mL Mouth/Throat BID Jacinda L Littlejohn, DO   15 mL at 03/28/22 9997    lubrifresh P.M. (artificial tears) ophthalmic ointment   Both Eyes Q6H Jacinda L Littlejohn, DO   Given at 03/28/22 0820    Arformoterol Tartrate (BROVANA) nebulizer solution 15 mcg  15 mcg Nebulization BID Jacinda L Littlejohn, DO        acetylcysteine (MUCOMYST) 20 % solution 600 mg  600 mg Inhalation BID Jacinda L Littlejohn, DO        guaiFENesin tablet 400 mg  400 mg Oral TID Jacinda L Littlejohn, DO   400 mg at 03/28/22 0820    sodium chloride flush 0.9 % injection             heparin (porcine) injection 5,000 Units  5,000 Units SubCUTAneous Q8H Jacinda L Littlejohn, DO   5,000 Units at 03/28/22 0542    pantoprazole (PROTONIX) 40 mg in sodium chloride (PF) 10 mL injection  40 mg IntraVENous Daily Jacinda L Littlejohn, DO   40 mg at 03/28/22 0819    sodium polystyrene (KAYEXALATE) 15 GM/60ML suspension 15 g  15 g Oral Once Jacinda L Littlejohn, DO        dextrose 10 % infusion   IntraVENous Continuous Jacinda L Littlejohn, DO 50 mL/hr at 03/28/22 0412 New Bag at 03/28/22 0412    albuterol (PROVENTIL) nebulizer solution 2.5 mg  2.5 mg Nebulization Q6H PRN Chevy Fish MD        hydrocortisone sodium succinate PF (SOLU-CORTEF) injection 100 mg  100 mg IntraVENous Q8H Chevy Fish MD        [START ON 3/29/2022] cosyntropin (CORTROSYN) injection 250 mcg  250 mcg IntraVENous Once Rabia Encinas MD        propofol injection  5-50 mcg/kg/min IntraVENous Continuous Tyron May MD 3.5 mL/hr at 03/27/22 1810 10 mcg/kg/min at 03/27/22 1810    0.9 % sodium chloride infusion   IntraVENous PRN Tyron May MD           Allergies:  Cefepime and Toradol [ketorolac tromethamine]    Social History:    Tobacco  Alcohol    Family History:     Family History   Problem Relation Age of Onset    Asthma Mother     Hypertension Mother     High Blood Pressure Mother     Diabetes Mother     Asthma Brother     High Blood Pressure Father        REVIEW OF SYSTEMS: could not be obtained         PHYSICAL EXAM:      Vitals:     /85   Pulse 97   Temp 97.1 °F (36.2 °C) (Infrared)   Resp 22   Ht 5' 4\" (1.626 m)   Wt 138 lb (62.6 kg)   SpO2 100%   BMI 23.69 kg/m²       General Appearance:    Intubated sedated    Head:    Normocephalic, atraumatic   Eyes:    +  pallor, no icterus,   Ears:    No obvious deformity or drainage.    Nose:   No nasal drainage   Throat:   Mucosa moist    Neck:   Supple, no lymphadenopathy   Lungs:     Crackles +   Heart:    Tachycardia    Abdomen:     Soft, non-tender, bowel sounds present    Extremities:   No edema,   Pulses:   Dorsalis pedis palpable    Skin:   no rashes , right chest Tesio catheter    CBC with Differential:      Lab Results   Component Value Date    WBC 6.0 03/28/2022    RBC 2.80 03/28/2022    HGB 7.5 03/28/2022    HCT 22.9 03/28/2022     03/28/2022    MCV 81.8 03/28/2022    MCH 26.8 03/28/2022    MCHC 32.8 03/28/2022    RDW 17.2 03/28/2022    NRBC 0.9 03/28/2022    SEGSPCT 67 06/27/2013    METASPCT 1.7 08/10/2020    LYMPHOPCT 19.1 03/28/2022    MONOPCT 2.6 03/28/2022    MYELOPCT 0.9 01/19/2022    BASOPCT 1.0 03/28/2022    MONOSABS 0.18 03/28/2022    LYMPHSABS 1.14 03/28/2022    EOSABS 0.11 03/28/2022    BASOSABS 0.00 03/28/2022       CMP     Lab Results   Component Value Date     03/28/2022    K 4.7 03/28/2022    K 3.7 03/04/2022     03/28/2022    CO2 22 03/28/2022    BUN 38 03/28/2022    CREATININE 4.7 03/28/2022    GFRAA 13 03/28/2022    LABGLOM 13 03/28/2022    GLUCOSE 92 03/28/2022    GLUCOSE 130 05/18/2012    PROT 6.2 03/28/2022    LABALBU 2.1 03/28/2022    LABALBU 4.1 05/18/2012    CALCIUM 7.9 03/28/2022    BILITOT 0.5 03/28/2022    ALKPHOS 210 03/28/2022    AST 58 03/28/2022    ALT 28 03/28/2022         Hepatic Function Panel:    Lab Results   Component Value Date    ALKPHOS 210 03/28/2022    ALT 28 03/28/2022    AST 58 03/28/2022    PROT 6.2 03/28/2022    BILITOT 0.5 03/28/2022    BILIDIR 0.2 03/28/2022    IBILI 0.3 03/28/2022    LABALBU 2.1 03/28/2022    LABALBU 4.1 05/18/2012       PT/INR:    Lab Results   Component Value Date    PROTIME 12.8 03/27/2022    PROTIME 13.6 08/14/2011    INR 1.2 03/27/2022       TSH:    Lab Results   Component Value Date    TSH 5.370 03/28/2022       U/A:    Lab Results   Component Value Date    COLORU Yellow 03/28/2022    PHUR 8.5 03/28/2022    LABCAST RARE 01/12/2020    WBCUA >20 03/28/2022    WBCUA 0-1 05/18/2012    RBCUA 5-10 03/28/2022    RBCUA 1-3 08/01/2013    MUCUS Present 02/12/2016    TRICHOMONAS Present 07/17/2020    YEAST RARE 05/24/2018    BACTERIA MANY 03/28/2022    CLARITYU Clear 03/28/2022    SPECGRAV 1.015 03/28/2022    LEUKOCYTESUR LARGE 03/28/2022    UROBILINOGEN 0.2 03/28/2022    BILIRUBINUR Negative 03/28/2022    BILIRUBINUR SMALL 05/18/2012    BLOODU MODERATE 03/28/2022    GLUCOSEU 100 03/28/2022    GLUCOSEU >=1000 05/18/2012    AMORPHOUS RARE 11/01/2019       ABG:    Lab Results   Component Value Date    ABP6VLP 14.0 02/15/2021    L1EBYBPO 97.7 09/08/2012    PHART 7.345 07/20/2012    KWC7ALQ 35.0 10/29/2019    PO2ART 91.5 10/29/2019       MICROBIOLOGY:    Blood culture - pending         Radiology :    Chest X ray : LLL infiltrates       IMPRESSION:     1. Aspiration pneumonia, elevated procalcitonin, mild leukocytosis   2. ESRD on HD , DM recurrent hypoglycemia   3. Resp failure - intubated   4. Hx of recurrent C diff infection     RECOMMENDATIONS:      1. Continue zosyn 3..375 grams IV q  12 hrs   2. Flagyl  mg q 8 hrs   3.  Supportive care     Thank you Dr Jose J Mckeon for the consult

## 2022-03-28 NOTE — CONSULTS
ENDOCRINOLOGY INITIAL CONSULTATION NOTE      Date of admission: 3/27/2022  Date of service: 3/28/2022  Admitting physician: Maday Bob DO   Primary Care Physician: Arvel Dance, MD  Consultant physician: Gia Veloz MD     Reason for the consultation:  DM type 1, labile diabetes     History of Present Illness: The history is provided from medical records, pt sedated intubated     Catherine Painting is a 36 y.o. old female nursing home resident with PMH of Type I DM, ESRD on PD,HTN,hypothyroidism, infective endocarditis and other listed below admitted to [facility] on 3/27/2022 because of AMS. Endocrine service was consulted for DM management. Apparently the patient was found to be severely altered by by employees of her SNF. Per records BG was found to be <40 on fingerstick. The patient was transported to [facility] ED. On arrival to the ED, the pt was obtunded and unable to answer questions/follow commands. D/t concern for respiratory compromise and inability to protect her airway, she was urgently intubated for airway protection. Admission labs: Cr 4.9, glucose  178, pro-BNP >70,000, WBCs 12.4, Hgb 6.5, UA showing pyuria, large blood w/ 1-3 wbcs, moderate bacteria, and large leukocyte esterase. CXR showing mild perihilar fullness, concern for infiltrate vs edema  CT abdomen showing thick-walled loops of small bowel, concerning for enteritis  CT head (-)ve for acute intracranial processes. Prior to [address] [place] [address]  The patient was diagnosed with type 1DM at the age 17. Prior to admission patient was on lantus 5 units daily, Humalog per slisding scale. Per mother, the patient was experiencing a lot of hypoglycemia at nursing home.   Her diabetes complicated with + Retinopathy, + ESRD on dialysis, + Neuropathy   Lab Results   Component Value Date    LABA1C 7.7 03/02/2022     Inpatient diet:   Renal/Carb Restricted diet     Point of care glucose monitoring (Independently reviewed)   Recent Labs     03/27/22  2232 22  1924 22  0356 22  0415 22  0527 22  0656 22  0809 22  0948   GLUMET 159* 178* 39* 126* 94 109* 110* 114*       Past medical history:   Past Medical History:   Diagnosis Date    Acute congestive heart failure ([address]     BC (acute kidney injury) ([address] 10/01/2019    Cardiac arrest ([address] 02/15/2021    Cephalgia 10/09/2019    Chronic kidney disease     Depression     Diabetes mellitus ([address]     Diabetic gastroparesis associated with type 1 diabetes mellitus ([address] 2018    Diabetic ketoacidosis ([address] 2011    Diabetic ketoacidosis with coma associated with type 1 diabetes mellitus ([address] 2013    Diabetic polyneuropathy associated with type 1 diabetes mellitus ([address] 2020    Drug use complicating pregnancy in third trimester     Endocarditis 10/31/2020    ESRD (end stage renal disease) ([address] 2020    H/O cardiovascular stress test 2021    Lexiscan    Hemodialysis patient [facility])     History of blood transfusion 2019    Hyperlipidemia 10/08/2020    Hyperosmolar hyperglycemic state (HHS) ([address] 2020    Hypothyroidism 10/08/2020    Iron deficiency anemia 10/01/2019    MDRO (multiple drug resistant organisms) resistance     MRSA (methicillin resistant Staphylococcus aureus)     back wound abcess    Non compliance w medication regimen 2016    Other disorders of kidney and ureter     Pregnancy 2016    16 weeks    Previous  delivery affecting pregnancy, antepartum 2017    Previous stillbirth or demise, antepartum 2016    Seizure ([address] 2020    Severe pre-eclampsia in third trimester 2016    Shock liver 02/15/2021    Valvular endocarditis 11/10/2020    This Diagnosis was added to the Problem List based on transcribed orders from Dr. Key Garcia          Past surgical history:  Past Surgical History:   Procedure Laterality Date    BACK SURGERY      abscess    CATHETER REMOVAL N/A 10/1/2021    PERITONEAL CATHETER REMOVAL performed by Hafsa Sparks MD at [facility]      x2   [address] [place], LAPAROSCOPIC N/A 11/7/2019    CHOLECYSTECTOMY LAPAROSCOPIC performed by Wendy Kent MD at [address] [facility] N/A 6/25/2018    COLONOSCOPY WITH BIOPSY performed by Brenda Damico MD at [address] [facility] COLONOSCOPY N/A 12/18/2018    COLONOSCOPY WITH BIOPSY performed by Sweetie Urban MD at [address]  1/31/2012    EF 57%    ECHO COMPL W DOP COLOR FLOW  6/10/2013         EMBOLECTOMY N/A 11/6/2020    [address] [place] [address], [id number] [place] [address], YULISSA -- REQS ROOM 3 performed by Jef Ahumada MD at [address] [place] [address] Left 2/23/2021    LEFT LEG INCISION AND DRAINAGE, DEBRIDEMENT, WOUND VAC APPLICATION performed by Jelly Pascual DPM at [address] [place] [address] Left 5/18/2021    LEFT LEG DEBRIDEMENT BIOPSY POSS APPLICATION WOUND VAC performed by Jelly Pascual DPM at [place] [address] N/A 6/26/2020    LAPAROSCOPIC INSERTION PERITONEAL DIALYSIS CATHETER performed by Feliz Coyne MD at [facility] ESOPHAGOGASTRODUODENOSCOPY TRANSORAL DIAGNOSTIC N/A 5/30/2018    EGD ESOPHAGOGASTRODUODENOSCOPY performed by Brenda Damico MD at [address] [facility] TRANSESOPHAGEAL ECHOCARDIOGRAM N/A 10/19/2020    TRANSESOPHAGEAL ECHOCARDIOGRAM WITH BUBBLE STUDY performed by Zahira Hall MD at [address] [facility] [address] [address]  04/2018    UPPER GASTROINTESTINAL ENDOSCOPY  12/18/2018    EGD BIOPSY performed by Sweetie Urban MD at [address] 10/11/2019    EGD ESOPHAGOGASTRODUODENOSCOPY performed by Brie Angelo DO at [address] [facility] N/A 7/14/2021    EGD BIOPSY performed by Thony Ventura MD at [address] history:   Tobacco:   reports that she quit smoking about 13 months ago.  Her smoking use included cigarettes. She has a 3.00 pack-year smoking history. She has never used smokeless tobacco.  Alcohol:   reports no history of alcohol use. Drugs:   reports previous drug use. Drug: Opiates . Family history:    Family History   Problem Relation Age of Onset   Ana Tovar Asthma Mother     Hypertension Mother     High Blood Pressure Mother     Diabetes Mother     Asthma Brother     High Blood Pressure Father        Allergy and drug reactions:    Allergies   Allergen Reactions    Cefepime Other (See Comments)     \" neurological side effect- slurred speech and confusion    Toradol [Ketorolac Tromethamine] Anaphylaxis and Hives       Scheduled Meds:   levothyroxine  75 mcg Oral Daily    mirtazapine  15 mg Oral Nightly    escitalopram  10 mg Oral Daily    ARIPiprazole  5 mg Oral Nightly    rOPINIRole  0.25 mg Oral Nightly    sodium chloride flush  5-40 mL IntraVENous 2 times per day    piperacillin-tazobactam  3,375 mg IntraVENous Q12H    chlorhexidine  15 mL Mouth/Throat BID    artificial tears   Both Eyes Q6H    Arformoterol Tartrate  15 mcg Nebulization BID    acetylcysteine  600 mg Inhalation BID    guaiFENesin  400 mg Oral TID    sodium chloride flush        heparin (porcine)  5,000 Units SubCUTAneous Q8H    pantoprazole (PROTONIX) 40 mg injection  40 mg IntraVENous Daily    sodium polystyrene  15 g Oral Once    hydrocortisone sodium succinate PF  100 mg IntraVENous Q8H    [START ON 3/29/2022] cosyntropin  250 mcg IntraVENous Once    metroNIDAZOLE  500 mg IntraVENous Q8H     PRN Meds:   sodium chloride flush, 5-40 mL, PRN  sodium chloride, 25 mL, PRN  ondansetron, 4 mg, Q8H PRN   Or  ondansetron, 4 mg, Q6H PRN  polyethylene glycol, 17 g, Daily PRN  acetaminophen, 650 mg, Q6H PRN   Or  acetaminophen, 650 mg, Q6H PRN  glucose, 15 g, PRN  dextrose, 12.5 g, PRN  glucagon (rDNA), 1 mg, PRN  dextrose, 100 mL/hr, PRN  albuterol, 2.5 mg, Q6H PRN  sodium chloride, , PRN      Continuous Infusions:   sodium chloride      dextrose      fentaNYL 5 mcg/ml in 0.9%  ml infusion 25 mcg/hr (03/28/22 0417)    dextrose 50 mL/hr at 03/28/22 0412    propofol 10 mcg/kg/min (03/27/22 1810)    sodium chloride         Review of Systems  Non-obtainable     OBJECTIVE    BP 95/73   Pulse 96   Temp 97.5 °F (36.4 °C) (Infrared)   Resp 21   Ht 5' 4\" (1.626 m)   Wt 138 lb (62.6 kg)   SpO2 100%   BMI 23.69 kg/m²     Intake/Output Summary (Last 24 hours) at 3/28/2022 1047  Last data filed at 3/28/2022 0800  Gross per 24 hour   Intake 238 ml   Output 80 ml   Net 158 ml       Physical examination:  General: intubated sedated   HEENT: normocephalic non-traumatic,    Neck: supple, no LN enlargement,    Pulm: Clear equal air entry no added sounds,   CVS: S1 + S2   Abd: soft lax, no tenderness, no organomegaly, audible bowel sounds. Skin: warm, no lesions, no rash. Neuro: pt sedated     Review of Laboratory Data:  I personally reviewed the following labs:   Recent Labs     03/27/22 1832 03/28/22 0228 03/28/22  0530   WBC 12.4* 8.0 6.0   RBC 2.47* 3.03* 2.80*   HGB 6.5* 7.9* 7.5*   HCT 20.3* 24.6* 22.9*   MCV 82.2 81.2 81.8   MCH 26.3 26.1 26.8   MCHC 32.0 32.1 32.8   RDW 18.2* 17.3* 17.2*    278 244   MPV 9.9 10.3 10.4     Recent Labs     03/27/22 1832 03/27/22  1832 03/28/22 0228 03/28/22  0400 03/28/22  0530      < > 132 134 136   K 5.0   < > 6.0* 4.7 4.7      < > 99 101 103   CO2 25   < > 22 22 22   BUN 38*   < > 38* 39* 38*   CREATININE 4.9*   < > 4.7* 4.8* 4.7*   GLUCOSE 163*   < > 78 133* 92   CALCIUM 8.7   < > 8.1* 7.8* 7.9*   PROT 6.7  --  6.6  --  6.2*   LABALBU 2.5*  --  2.3*  --  2.1*   BILITOT 0.4  --  0.7  --  0.5   ALKPHOS 234*  --  231*  --  210*   AST 33*  --  82*  --  58*   ALT 20  --  31  --  28    < > = values in this interval not displayed.      Beta-Hydroxybutyrate   Date Value Ref Range Status   03/27/2022 0.10 0.02 - 0.27 mmol/L Final   03/02/2022 2.05 (H) 0.02 - 0.27 mmol/L Final 02/01/2022 0.09 0.02 - 0.27 mmol/L Final     Lab Results   Component Value Date    LABA1C 7.7 03/02/2022    LABA1C 8.7 12/08/2021    LABA1C 10.2 11/23/2021     Lab Results   Component Value Date/Time    TSH 5.370 (H) 03/28/2022 01:56 AM    T4FREE 1.10 03/28/2022 01:56 AM    G2PTKGC 8.6 11/05/2015 02:20 AM    FT3 1.3 (L) 10/31/2020 10:43 AM    X5HDLSD 36.73 (L) 07/20/2020 02:00 PM     Lab Results   Component Value Date    LABA1C 7.7 03/02/2022    GLUCOSE 92 03/28/2022    GLUCOSE 130 05/18/2012    MALBCR 2949.3 01/15/2020    LABMICR 1740.1 01/15/2020    LABCREA 59 01/15/2020    LABCREA 61 01/15/2020     Lab Results   Component Value Date    TRIG 82 01/14/2020    HDL 33 01/14/2020    LDLCALC 46 01/14/2020    CHOL 95 01/14/2020       Blood culture   Lab Results   Component Value Date    BC 5 Days no growth 01/01/2022    BC 5 Days no growth 11/22/2021       Radiology:  XR CHEST PORTABLE   Final Result   Infiltrate and or atelectasis at the left lower lobe. Substantially resolved   infiltrate and or atelectasis on the right. New NG tube. CT HEAD WO CONTRAST   Final Result   No acute intracranial abnormality or hemorrhage. CT ABDOMEN PELVIS WO CONTRAST Additional Contrast? None   Final Result   1. Moderate ascites throughout abdomen and pelvis. 2.  Multiple thick walled loops of small bowel throughout the abdomen could   suggest nonspecific infectious or inflammatory enteritis. 3.  Generalized body wall edema. 4.  Small bilateral pleural effusions with adjacent atelectatic changes. XR ABDOMEN FOR NG/OG/NE TUBE PLACEMENT   Final Result   Enteric tube tip in the body of the stomach. XR CHEST PORTABLE   Final Result   Right perihilar opacity.          XR CHEST PORTABLE    (Results Pending)   XR CHEST PORTABLE    (Results Pending)   XR CHEST PORTABLE    (Results Pending)       Medical Records/Labs/Images review:   I personally reviewed and summarized previous records   All labs and imaging were reviewed independently     [address], a 36 y.o.-old female seen today for inpatient diabetes management     Diabetes Mellitus type I    · The patient is extremely insulin sensitive. with ESRD she is even at much higher risk for hypoglycemia   · Currently not on any insulin  · With DM type 1, will closely monitor BG and start modified low dose sliding scale 1u:100>200 once BG start to go up     Evaluation for adrenal insufficiency   · With type 1 AM and primary hypothyroidism it is extremely important to r/o AI in this patient (Autoimmune Polyendocrine syndrome type 2, or Shmidth syndrome)   · Cortisol level this AM was 7.76 waiting for Cosyntropin stim test   · Low binding proteins might explain her current low normal total cortisol. Total serum cortisol level is typically used to assess for adrenal insufficiency and determine the need for glucocorticoid therapy. However, free cortisol rather than the protein-bound portion is responsible for the physiologic function of the hormone.  Unfortunately, measuring free cortisol is difficult and not available on most places   In a healthy person, 10% of the cortisol is present in the free form, 20% is bound to albumin, and 70% is bound to cortisol-binding globulin (CBG), and for this reason variation in CBG and albumin significantly affects serum total cortisol levels  Reliable assessment of HPA-axis reserve is difficult in severely ill patients, because CBG falls substantially during the acute phase response (e.g. sepsis, trauma, and those undergoing major surgeries)  Studies have shown that nearly 40% of critically ill patients with hypoproteinemia had subnormal serum total cortisol concentrations and Cosyntropin-stimulated serum total cortisol concentrations than those with near-normal serum albumin concentrations, even though their adrenal function was normal when free cortisol was measured    AMS   · Likely multifactorial but hypoglycemia likely playing a major role   · Per mother, pt was experiencing frequent hypoglycemic episodes at nursing home      primary Hypothyroidism  · Admission labs showed low normal free T4 with slightly elevated TSH   · Will sightly adjust levothyroxine to 88 mcg daily      ESRD  · Pt at risk for hypoglycemia  · On dialysis, nephrology following    Thank you for allowing us to participate in the care of this patient. Please do not hesitate to contact us with any additional questions. Riki Sue MD  Endocrinologist, [facility] [place] and Endocrinology   [address] [place] [address]   Phone: [phone]  Fax: [phone]  ----------------------------  An electronic signature was used to authenticate this note.  Collins Peters MD on 3/28/2022 at 10:47 AM

## 2022-03-28 NOTE — CONSULTS
[facility]  Internal Medicine Residency Program  Consult: Critical care  MICU    Patient:  Nitza Denis 36 y.o. female MRN: [id number]     Date of Service: 3/28/2022    Hospital Day: 2      Chief complaint: had concerns including Hypoglycemia (Blood sugar low at the NH, patient found unresponsive. ). History of Present Illness   History was obtained from  EMR     The patient is a 36 y.o. female with PMH of uncontrolled T1DM (Hgb A1c 7.7%), ESRD on HD, hypothyroidism, prior endocarditis w/ septic emboli, anemia of chronic disease, prior MDRO UTI, and MRSA infection, who was brought in to the ED for acute encephalopathy. Per note review, at approximately [id number] on 3/27 the pt was found to be lethargic and encephalopathic by employees of her [facility] [place] [address] that afternoon. POCT BG was collected, which read as too low to quantify on the meter. EMS was called, and the pt was transported to [facility] ED. On arrival to the ED, the pt was obtunded and unable to answer questions/follow commands. D/t concern for respiratory compromise and inability to protect her airway, she was urgently intubated for airway protection. In the ED, the pt was initially tachycardic to 121bpm, hypertensive to 191/119mmhg, afebrile, O2 saturation of 100% on the ventilator. Labs were notable for Cr 4.9, POCT , pro-BNP >70,000 (at baseline from prior readings), troponin 206 (elevated from 132 as of 2/1/2022), WBCs 12.4, Hgb 6.5 (baseline 6.0-8.0), Alk Phos of 234, UA showing pyuria, large blood w/ 1-3 wbcs, proteinuria, moderate bacteria, and large leukocyte esterase. CXR showing mild perihilar fullness, concern for infiltrate vs edema. CT abdomen showing thick-walled loops of small bowel, concerning for enteritis. CT head (-)ve for acute intracranial processes. EKG showing sinus tachycardia w/ normal axis and no ST/T wave changes. QTc prolonged at 486.  The pt was given a dose of Vancomycin and Zosyn in the ED, and was started on IVNS at 100cc/hr. She was then transferred to the MICU for further management and stabilization.       Past Medical History:      Diagnosis Date    Acute congestive heart failure ([address]     BC (acute kidney injury) ([address] 10/01/2019    Cardiac arrest ([address] 02/15/2021    Cephalgia 10/09/2019    Chronic kidney disease     Depression     Diabetes mellitus ([address]     Diabetic gastroparesis associated with type 1 diabetes mellitus ([address] 2018    Diabetic ketoacidosis ([address] 2011    Diabetic ketoacidosis with coma associated with type 1 diabetes mellitus ([address] 2013    Diabetic polyneuropathy associated with type 1 diabetes mellitus ([address] 2020    Drug use complicating pregnancy in third trimester     Endocarditis 10/31/2020    ESRD (end stage renal disease) ([address] 2020    H/O cardiovascular stress test 2021    Lexiscan    [facility]od[facility])     History of blood transfusion 2019    Hyperlipidemia 10/08/2020    Hyperosmolar hyperglycemic state (HHS) ([address] 2020    Hypothyroidism 10/08/2020    Iron deficiency anemia 10/01/2019    MDRO (multiple drug resistant organisms) resistance     MRSA (methicillin resistant Staphylococcus aureus)     back wound abcess    Non compliance w medication regimen 2016    Other disorders of kidney and ureter     Pregnancy 2016    16 weeks    Previous  delivery affecting pregnancy, antepartum 2017    Previous stillbirth or demise, antepartum 2016    Seizure ([address] 2020    Severe pre-eclampsia in third trimester 2016    Shock liver 02/15/2021    Valvular endocarditis 11/10/2020    This Diagnosis was added to the Problem List based on transcribed orders from Dr. Marty Orozco          Past Surgical History:        Procedure Laterality Date    BACK SURGERY      abscess    CATHETER REMOVAL N/A 10/1/2021    PERITONEAL CATHETER REMOVAL performed by Casi Mccall MD at [facility]      x2    CHOLECYSTECTOMY, LAPAROSCOPIC N/A 11/7/2019    CHOLECYSTECTOMY LAPAROSCOPIC performed by Kelsi Oliver MD at [facility] N/A 6/25/2018    COLONOSCOPY WITH BIOPSY performed by Nabil Llanes MD at [id number] [place] [address] COLONOSCOPY N/A 12/18/2018    COLONOSCOPY WITH BIOPSY performed by Celestino Olivas MD at [address]  1/31/2012    EF 57%    ECHO COMPL W DOP COLOR FLOW  6/10/2013         EMBOLECTOMY N/A 11/6/2020    [address] [place] [address], [address] [facility] -- REQS ROOM 3 performed by Keesha Machuca MD at [address] [place] [address] Left 2/23/2021    LEFT LEG INCISION AND DRAINAGE, DEBRIDEMENT, WOUND VAC APPLICATION performed by Cleopatra Crocker DPM at [address] [place] [address] Left 5/18/2021    LEFT LEG DEBRIDEMENT BIOPSY POSS APPLICATION WOUND VAC performed by Cleopatra Crocker DPM at [place] [address] N/A 6/26/2020    LAPAROSCOPIC INSERTION PERITONEAL DIALYSIS CATHETER performed by Alejandro Aleman MD at [facility] ESOPHAGOGASTRODUODENOSCOPY TRANSORAL DIAGNOSTIC N/A 5/30/2018    EGD ESOPHAGOGASTRODUODENOSCOPY performed by Nabil Llanes MD at [id number] [place] [address] TRANSESOPHAGEAL ECHOCARDIOGRAM N/A 10/19/2020    TRANSESOPHAGEAL ECHOCARDIOGRAM WITH BUBBLE STUDY performed by Claire Tsang MD at [facility] [address] [address]  04/2018    UPPER GASTROINTESTINAL ENDOSCOPY  12/18/2018    EGD BIOPSY performed by Celestino Olivas MD at [address] [place] [address] 10/11/2019    EGD ESOPHAGOGASTRODUODENOSCOPY performed by Onur Montoya DO at [address] [place] [address] N/A 7/14/2021    EGD BIOPSY performed by Titus Ramírez MD at [place] [address]       Medications Prior to Admission:    Prior to Admission medications    Medication Sig Start Date End Date Taking?  Authorizing Provider   escitalopram (LEXAPRO) 10 MG tablet Take 1 tablet by mouth daily 3/5/22   Rochelle Newsome PA-C   insulin glargine (LANTUS) 100 UNIT/ML injection vial Inject 5 Units into the skin every morning 3/5/22   Rochelle Newsome PA-C   Glucagon 3 MG/DOSE POWD by Nasal route See Admin Instructions    Historical Provider, MD   omeprazole (PRILOSEC) 20 MG delayed release capsule Take 20 mg by mouth nightly    Historical Provider, MD   hydrOXYzine (ATARAX) 25 MG tablet Take 25 mg by mouth every 8 hours as needed for Itching    Historical Provider, MD   epoetin nena-epbx (RETACRIT) 99973 UNIT/ML SOLN injection Inject 0.5 mLs into the skin three times a week 2/4/22   Frank Lopez DO   pregabalin (LYRICA) 50 MG capsule Take 1 capsule by mouth 3 times daily for 30 days. Patient taking differently: Take 50 mg by mouth 2 times daily.   2/3/22 3/5/22  Frank Lopez DO   sevelamer (RENVELA) 800 MG tablet Take 2 tablets by mouth 3 times daily (with meals)    Historical Provider, MD   insulin lispro (HUMALOG) 100 UNIT/ML injection vial Inject 0-3 Units into the skin 4 times daily (before meals and nightly)  Patient taking differently: Inject 0-10 Units into the skin 3 times daily (before meals)  1/28/22   Néstor Hackett MD   dilTIAZem (CARDIZEM) 90 MG tablet Take 1 tablet by mouth 4 times daily Hold the morning of Hemodialysis  Hold For SBP <100, HR<55 1/28/22   Néstor Hackett MD   cholestyramine [place]) 4 g packet Take 1 packet by mouth 2 times daily 1/24/22   Néstor Hackett MD   promethazine (PHENERGAN) 25 MG tablet Take 25 mg by mouth every 6 hours as needed for Nausea    Historical Provider, MD   ondansetron (ZOFRAN) 4 MG tablet Take 4 mg by mouth every 8 hours as needed for Nausea or Vomiting    Historical Provider, MD   albuterol sulfate  (90 Base) MCG/ACT inhaler Inhale 4 puffs into the lungs as needed for Wheezing or Shortness of Breath 1/5/22   Sarah Ramires DO   vitamin D (ERGOCALCIFEROL) 1.25 MG (23358 UT) CAPS capsule Take 1 capsule by mouth once a week 9/3/21   Argenis Ramirez MD   mirtazapine (REMERON) 15 MG tablet Take 15 mg by mouth nightly    Historical Provider, MD   ARIPiprazole (ABILIFY) 5 MG tablet Take 5 mg by mouth nightly 4/18/21   Historical Provider, MD   levothyroxine (SYNTHROID) 75 MCG tablet TAKE 1 TABLET BY MOUTH IN THE MORNING BEFORE BREAKFAST 4/19/21   Jacinda Littlejohn DO   rOPINIRole (REQUIP) 0.25 MG tablet Take 0.25 mg by mouth nightly    Historical Provider, MD       Allergies:  Cefepime and Toradol [ketorolac tromethamine]    Social History:   TOBACCO:   reports that she quit smoking about 13 months ago. Her smoking use included cigarettes. She has a 3.00 pack-year smoking history. She has never used smokeless tobacco.  ETOH:   reports no history of alcohol use. Family History:       Problem Relation Age of Onset   Eric Spina Asthma Mother     Hypertension Mother     High Blood Pressure Mother     Diabetes Mother     Asthma Brother     High Blood Pressure Father      REVIEW OF SYSTEMS: Unable to complete d/t pt's mental status     Physical Exam     · Vitals: BP (!) 154/109   Pulse 88   Temp 98.1 °F (36.7 °C)   Resp 12   Ht 5' 4\" (1.626 m)   Wt 129 lb (58.5 kg)   SpO2 100%   BMI 22.14 kg/m²       · Constitutional: Sedated, intubated, unable to answer questions or follow commands. · Head: Normocephalic and atraumatic. · Eyes: Conjunctiva normal, (-) scleral icterus. Mucus membranes moist.  · Mouth: Mucus membranes moist. Oropharynx clear. No deviation of the tongue or uvula. Normal dentition. · Neck: (-)  swelling, (-) JVD, trachea midline, R-sided Tessio in place in the IJ  · Respiratory: ETT in place, crackles present diffusely throughout all lung fields. (-)  wheezes, (-)  rales, (-)  Rhonchi. No increased work of breathing or respiratory distress   · Cardiovascular: RRR. (-)  murmurs, (-) gallops,  (-) rubs. S1 and S2 were normal.   · GI:  Abdomen soft, non-tender, mildly distended. (+) soft BS. (-) rigidity. · Extremities: +1 pitting peripheral edema. Warm and well perfused. (-) clubbing, (-) cyanosis. 2+ distal pulses. · Neurologic: No focal neurological deficits. No speech slurring or tremor     Lines  Type Location Date   CVC  R fem  3/27/2022   HD cath R IJ            Labs and Imaging Studies   Basic Labs  Recent Labs     03/27/22 1832      K 5.0      CO2 25   BUN 38*   CREATININE 4.9*   GLUCOSE 163*   CALCIUM 8.7       Recent Labs     03/27/22 1832   WBC 12.4*   RBC 2.47*   HGB 6.5*   HCT 20.3*   MCV 82.2   MCH 26.3   MCHC 32.0   RDW 18.2*      MPV 9.9       HFP:    Lab Results   Component Value Date    PROT 6.7 03/27/2022     U/A:  No components found for: Mervat Alanidel, USPGRAV, UPH, UPROTEIN, UGLUCOSE, UKETONE, UBILI, UBLOOD, [place], [place], [place], [facility], [place], [place], [place], [facility], [place]  PT/INR:  No results found for: PTINR  ABG:     Lab Results   Component Value Date    PHART 7.345 07/20/2012    PH 7.573 03/27/2022    PH 7.290 09/08/2012    RVW6NST 35.0 10/29/2019    PCO2 22.4 03/27/2022    PO2ART 91.5 10/29/2019    PO2 95.3 03/27/2022    DFK5WFT 14.0 02/15/2021    HCO3 20.2 03/27/2022    BE -1.3 03/27/2022    THB 7.4 03/27/2022    A4ZTUTBT 97.7 09/08/2012    O2SAT 96.8 03/27/2022     Imaging Studies:     CT ABDOMEN PELVIS WO CONTRAST Additional Contrast? None    Result Date: 3/27/2022  EXAMINATION: CT OF THE ABDOMEN AND PELVIS WITHOUT CONTRAST 3/27/2022 8:06 pm TECHNIQUE: CT of the abdomen and pelvis was performed without the administration of intravenous contrast. Multiplanar reformatted images are provided for review. Dose modulation, iterative reconstruction, and/or weight based adjustment of the mA/kV was utilized to reduce the radiation dose to as low as reasonably achievable.  COMPARISON: December 7, 2021 HISTORY: ORDERING SYSTEM PROVIDED HISTORY: ab pain TECHNOLOGIST PROVIDED HISTORY: Reason for exam:->ab pain Additional Contrast?->None Decision Support Exception - unselect if not a suspected or confirmed emergency medical condition->Emergency Medical Condition (MA) What reading provider will be dictating this exam?->CRC FINDINGS: Moderate ascites throughout abdomen and pelvis. Multiple thick walled loops of small bowel throughout abdomen. Generalized hazy appearance of abdominal and pelvic mesenteric fat. No bowel obstruction. The appendix is unremarkable in the right lower quadrant. Liver is unremarkable. Evidence of cholecystectomy. Pancreas is not optimally assessed due to adjacent ascites. Spleen is normal in size. No hydronephrosis. No renal or ureter calculus. Reyes catheter is present. There is no retroperitoneal lymphadenopathy. Generalized body wall edema. There are bilateral pleural effusions. Atelectatic changes in lung bases notable on the left. 1.  Moderate ascites throughout abdomen and pelvis. 2.  Multiple thick walled loops of small bowel throughout the abdomen could suggest nonspecific infectious or inflammatory enteritis. 3.  Generalized body wall edema. 4.  Small bilateral pleural effusions with adjacent atelectatic changes. XR KNEE RIGHT (3 VIEWS)    Result Date: 3/3/2022  EXAMINATION: THREE XRAY VIEWS OF THE RIGHT KNEE 3/2/2022 6:22 pm COMPARISON: None. HISTORY: ORDERING SYSTEM PROVIDED HISTORY: R knee pain after fall TECHNOLOGIST PROVIDED HISTORY: Reason for exam:->R knee pain after fall FINDINGS: No cortical irregularity identified about the right knee to suggest fracture. Lateral subluxation of the patella relative to the trochlear groove. Soft tissue edema in the vicinity of the medial patellar retinaculum. Large suprapatellar joint effusion. Osseous mineralization is normal.     1.  Lateral subluxation of the patella relative to the trochlear groove. Soft tissue edema in the vicinity of the medial patellar retinaculum with a large suprapatellar joint effusion. (Suspect medial patellar retinaculum injury.) 2.   No additional osseous abnormality seen about the right knee. RECOMMENDATION: Suggest screening right knee MRI. CT HEAD WO CONTRAST    Result Date: 3/27/2022  EXAMINATION: CT OF THE HEAD WITHOUT CONTRAST  3/27/2022 8:06 pm TECHNIQUE: CT of the head was performed without the administration of intravenous contrast. Dose modulation, iterative reconstruction, and/or weight based adjustment of the mA/kV was utilized to reduce the radiation dose to as low as reasonably achievable. COMPARISON: January 22, 2022 HISTORY: ORDERING SYSTEM PROVIDED HISTORY: ams TECHNOLOGIST PROVIDED HISTORY: Has a \"code stroke\" or \"stroke alert\" been called? ->No Reason for exam:->ams Decision Support Exception - unselect if not a suspected or confirmed emergency medical condition->Emergency Medical Condition (MA) What reading provider will be dictating this exam?->CRC FINDINGS: BRAIN/VENTRICLES: There is no acute intracranial hemorrhage, mass effect or midline shift. No abnormal extra-axial fluid collection. The gray-white differentiation is maintained without evidence of an acute infarct. There is no evidence of hydrocephalus. ORBITS: The visualized portion of the orbits demonstrate no acute abnormality. SINUSES: The visualized paranasal sinuses and mastoid air cells demonstrate no acute abnormality. SOFT TISSUES/SKULL:  No acute abnormality of the visualized skull or soft tissues. No acute intracranial abnormality or hemorrhage. XR CHEST PORTABLE    Result Date: 3/27/2022  EXAMINATION: ONE XRAY VIEW OF THE CHEST 3/27/2022 6:31 pm COMPARISON: None. HISTORY: ORDERING SYSTEM PROVIDED HISTORY: weakness, Possible Stroke TECHNOLOGIST PROVIDED HISTORY: Reason for exam:->weakness, Possible Stroke What reading provider will be dictating this exam?->CRC FINDINGS: Right perihilar opacity there is no effusion or pneumothorax. The cardiomediastinal silhouette is without acute process. The osseous structures are without acute process. Right-sided hemodialysis catheter tip in the right atrium. Right perihilar opacity. XR ABDOMEN FOR NG/OG/NE TUBE PLACEMENT    Result Date: 3/27/2022  EXAMINATION: ONE SUPINE XRAY VIEW(S) OF THE ABDOMEN 3/27/2022 7:49 pm COMPARISON: None. HISTORY: ORDERING SYSTEM PROVIDED HISTORY: OG tube placement TECHNOLOGIST PROVIDED HISTORY: Reason for exam:->OG tube placement Portable? ->Yes What reading provider will be dictating this exam?->CRC FINDINGS: Nonspecific bowel gas pattern without evidence of obstruction. No abnormal calcifications. No acute osseous abnormality. Enteric tube tip in the body of the stomach. Surgical clips right upper quadrant. Enteric tube tip in the body of the stomach. Resident's Assessment and Plan     Assessment  1. Acute metabolic encephalopathy - possibly 2/2 hypoglycemia w/ subsequent seizure activity +/- aspiration pneumonia/pneumonitis   · Per note review, pt's blood glucose was too low to quantify on POCT glucometer while at [facility] [place]   · CXR showing R perihilar opacity, concerning for infiltrate vs pulmonary edema  2. Acute hypoxic respiratory failure 2/2 inability to protect airway in the setting of #1 above   · Pt currently intubated, sedated, and mechanically ventilated   3. Leukocytosis, concern for occult infectious process - likely pneumonia +/- enterocolitis +/- UTI   · CXR demonstrating R perihilar infiltrate vs edema as noted above   · CT abdomen/pelvis showing thick-walled, dilated loops of small bowel, concerning for enteritis   · UA from ED showing pyuria w/ large Leukocyte Esterase and moderate bacteria   · Pt tested (+)ve for C. Diff on 3/2/2022 - received antibiotic treatment at that time   · Pt currently afebrile, mildly hypertensive, requiring no vasopressors - no current concern for sepsis/septic shock   4. Elevated troponin w/o EKG changes - likely increased in setting of renal failure, r/o NSTEMI   · Troponin 132 as of 2/1/2022  5.  Normocytic anemia - likely anemia of chronic renal disease   · Pt's baseline Hgb 6.0-8.0  · Received 1U PRBCs in the ED   6. Prolonged QTc   · QTc on initial  - will repeat   7. Hx ESRD on HD - likely 2/2 diabetic renal injury   · Pt has a PMHx of medical noncompliance   · Known to Dr. Kelly Garcia   · Unclear if pt was receiving HD while in the NH - pro-BNP >70,000 and CT abdomen/pelvis showing diffuse abdominal wall edema and mild abdominal ascites   8. Hx T1DM - uncontrolled   · Hgb A1c 7.7% as of 3/2/2022  · Has been followed by Endocrinology - Dr. Keisha Herndon in the past   9. Hx Anxiety/Depression    Plan    Continue sedation, intubation, mechanical ventilation - will liberate from vent when able    Daily CXR and ABG    Initiate Vancomycin and Zosyn for empiric antimicrobial coverage - will f/u repeat pancultures and procalcitonin   Consider ID consult given pt's extensive history of MDRO/MRSA and recent C.diff infection    F/u repeat troponin, lactic acid, UDS, SDS     F/u TSH/Free T4 and iron studies    POCT BG q4hrs and hypoglycemia protocol in place    F/u repeat EKG    Strict I/Os   Teresa Granados Nephrology consulted for hemodialysis - appreciate further recs    Daily labs - will replete electrolytes when necessary    Continue to monitor vitals     PT/OT evaluation: Not indicated at this time   DVT prophylaxis/ GI prophylaxis :  Heparin / Protonix    Disposition: MICU    Divine Salvador DO, PGY-2  Attending physician: Dr. Lenwood Merlin  Preceptor: Dr. Иван Haines    I personally saw, examined and provided care for the patient. Radiographs, labs and medication list were reviewed by me independently. I spoke with bedside nursing, therapists and consultants. Critical care services and times documented are independent of procedures and multidisciplinary rounds with Residents. Additionally comprehensive, multidisciplinary rounds were conducted with the MICU team. The case was discussed in detail and plans for care were established.  Review of Residents documentation was conducted and revisions were made as appropriate. I agree with the above documented exam, problem list and plan of care.   Rachele Park MD   CCT excluding procedures: 45'

## 2022-03-28 NOTE — CONSULTS
Department of Internal Medicine  Nephrology Consult Note      Reason for Consult:  ESRD on HD  Requesting Physician:  Dr. Stewart Left:  Found unresponsive and nursing home    History Obtained From:  electronic medical record    HISTORY OF PRESENT ILLNESS:  Briefly, Miss Karol Schumacher is a 36year-old female with a PMH of ESRD on hemodialysis 3 days/wk, on TTS schedule at [facility] I DM, poorly controlled with recurrent admissions for DKA, HTN, gastroparesis, ascites, infective endocarditis, cardiac arrest, hypothyroidism and depression who was admitted on March 27, 2022 after she was found unresponsive at the SNF where she resides. Patient was found to be profoundly hypoglycemic and EMS was called. She was intubated in ER for airway protection as she remained unresponsive. CT head showed no acute intracranial abnormality or hemorrhage, chest x-ray demonstrates right perihilar opacity concerning for aspiration pneumonia. She was ultimately admitted to MICU for further evaluation. Labs on admission were significant for sodium 135, potassium 5.0, chloride 100, bicarbonate 25, BUN 38, creatinine 4.9, proBNP >70,000. We are consulted for dialysis management.     Past Medical History:        Diagnosis Date    Acute congestive heart failure ([address]     BC (acute kidney injury) ([address] 10/01/2019    Cardiac arrest ([address] 02/15/2021    Cephalgia 10/09/2019    Chronic kidney disease     Depression     Diabetes mellitus ([address]     Diabetic gastroparesis associated with type 1 diabetes mellitus ([address] 12/17/2018    Diabetic ketoacidosis ([address] 08/27/2011    Diabetic ketoacidosis with coma associated with type 1 diabetes mellitus ([address] 06/26/2013    Diabetic polyneuropathy associated with type 1 diabetes mellitus ([address] 12/27/2020    Drug use complicating pregnancy in third trimester     Endocarditis 10/31/2020    ESRD (end stage renal disease) ([address] 09/29/2020    H/O cardiovascular stress test 2021    Lexiscan    [facility]     History of blood transfusion 2019    Hyperlipidemia 10/08/2020    Hyperosmolar hyperglycemic state (HHS) ([facility] 2020    Hypothyroidism 10/08/2020    Iron deficiency anemia 10/01/2019    MDRO (multiple drug resistant organisms) resistance     MRSA (methicillin resistant Staphylococcus aureus)     back wound abcess    Non compliance w medication regimen 2016    Other disorders of kidney and ureter     Pregnancy 2016    16 weeks    Previous  delivery affecting pregnancy, antepartum 2017    Previous stillbirth or demise, antepartum 2016    Seizure ([facility] 2020    Severe pre-eclampsia in third trimester 2016    Shock liver 02/15/2021    Valvular endocarditis 11/10/2020    This Diagnosis was added to the Problem List based on transcribed orders from Dr. Jc Horton        Past Surgical History:        Procedure Laterality Date    BACK SURGERY      abscess    CATHETER REMOVAL N/A 10/1/2021    PERITONEAL CATHETER REMOVAL performed by Gustavus Scheuermann, MD at [address] [facility] [address]      x2   [address] [facility], LAPAROSCOPIC N/A 2019    CHOLECYSTECTOMY LAPAROSCOPIC performed by Osvaldo Garner MD at [place] [address] N/A 2018    COLONOSCOPY WITH BIOPSY performed by Ayse Corea MD at [address] [facility] N/A 2018    COLONOSCOPY WITH BIOPSY performed by Cleopatra Blanco MD at [id number] [place] [address] ECHO COMPL [address] [facility] [address]  2012    EF 57%    ECHO COMPL W DOP COLOR FLOW  6/10/2013         EMBOLECTOMY N/A 2020    ANGIOVAC, VEGECTOMY, YULISSA -- REQS ROOM 3 performed by Richmond Bach MD at [address] [place] [address] Left 2021    LEFT LEG INCISION AND DRAINAGE, DEBRIDEMENT, WOUND VAC APPLICATION performed by Tatiana Knutson DPM at [address] [place] [address] Road Left 2021    LEFT LEG DEBRIDEMENT BIOPSY POSS APPLICATION WOUND VAC performed by Tatiana Knutson DPM at [facility] 20 N/A 6/26/2020    LAPAROSCOPIC INSERTION PERITONEAL DIALYSIS CATHETER performed by Carol Shukla MD at [facility] [address] ESOPHAGOGASTRODUODENOSCOPY TRANSORAL DIAGNOSTIC N/A 5/30/2018    EGD ESOPHAGOGASTRODUODENOSCOPY performed by James Estrella MD at [id number] [place] [address] TRANSESOPHAGEAL ECHOCARDIOGRAM N/A 10/19/2020    TRANSESOPHAGEAL ECHOCARDIOGRAM WITH BUBBLE STUDY performed by Cuco Elias MD at [address] [place] [address] [facility]  04/2018    UPPER GASTROINTESTINAL ENDOSCOPY  12/18/2018    EGD BIOPSY performed by Norberto Dolan MD at [facility] N/A 10/11/2019    EGD ESOPHAGOGASTRODUODENOSCOPY performed by Shirley Figueroa DO at [facility] N/A 7/14/2021    EGD BIOPSY performed by Dipika Shahid MD at [place] [address]     Current Medications:    Current Facility-Administered Medications: levothyroxine (SYNTHROID) tablet 75 mcg, 75 mcg, Oral, Daily  mirtazapine (REMERON) tablet 15 mg, 15 mg, Oral, Nightly  escitalopram (LEXAPRO) tablet 10 mg, 10 mg, Oral, Daily  ARIPiprazole (ABILIFY) tablet 5 mg, 5 mg, Oral, Nightly  rOPINIRole (REQUIP) tablet 0.25 mg, 0.25 mg, Oral, Nightly  sodium chloride flush 0.9 % injection 5-40 mL, 5-40 mL, IntraVENous, 2 times per day  sodium chloride flush 0.9 % injection 5-40 mL, 5-40 mL, IntraVENous, PRN  0.9 % sodium chloride infusion, 25 mL, IntraVENous, PRN  ondansetron (ZOFRAN-ODT) disintegrating tablet 4 mg, 4 mg, Oral, Q8H PRN **OR** ondansetron (ZOFRAN) injection 4 mg, 4 mg, IntraVENous, Q6H PRN  polyethylene glycol (GLYCOLAX) packet 17 g, 17 g, Oral, Daily PRN  acetaminophen (TYLENOL) tablet 650 mg, 650 mg, Oral, Q6H PRN **OR** acetaminophen (TYLENOL) suppository 650 mg, 650 mg, Rectal, Q6H PRN  piperacillin-tazobactam (ZOSYN) 3,375 mg in dextrose 5 % 100 mL IVPB extended infusion (mini-bag), 3,375 mg, IntraVENous, Q12H  glucose (GLUTOSE) 40 % oral gel 15 g, 15 g, Oral, PRN  dextrose 50 % IV solution, 12.5 g, IntraVENous, PRN  glucagon (rDNA) injection 1 mg, 1 mg, IntraMUSCular, PRN  dextrose 5 % solution, 100 mL/hr, IntraVENous, PRN  fentaNYL 5 mcg/ml in 0.9%  ml infusion,  mcg/hr, IntraVENous, Continuous  chlorhexidine (PERIDEX) 0.12 % solution 15 mL, 15 mL, Mouth/Throat, BID  lubrifresh P.M. (artificial tears) ophthalmic ointment, , Both Eyes, Q6H  Arformoterol Tartrate (BROVANA) nebulizer solution 15 mcg, 15 mcg, Nebulization, BID  acetylcysteine (MUCOMYST) 20 % solution 600 mg, 600 mg, Inhalation, BID  guaiFENesin tablet 400 mg, 400 mg, Oral, TID  sodium chloride flush 0.9 % injection, , ,   heparin (porcine) injection 5,000 Units, 5,000 Units, SubCUTAneous, Q8H  pantoprazole (PROTONIX) 40 mg in sodium chloride (PF) 10 mL injection, 40 mg, IntraVENous, Daily  sodium polystyrene (KAYEXALATE) 15 GM/60ML suspension 15 g, 15 g, Oral, Once  dextrose 10 % infusion, , IntraVENous, Continuous  albuterol (PROVENTIL) nebulizer solution 2.5 mg, 2.5 mg, Nebulization, Q6H PRN  hydrocortisone sodium succinate PF (SOLU-CORTEF) injection 100 mg, 100 mg, IntraVENous, Q8H  [START ON 3/29/2022] cosyntropin (CORTROSYN) injection 250 mcg, 250 mcg, IntraVENous, Once  metronidazole (FLAGYL) 500 mg in NaCl 100 mL IVPB premix, 500 mg, IntraVENous, Q8H  propofol injection, 5-50 mcg/kg/min, IntraVENous, Continuous  0.9 % sodium chloride infusion, , IntraVENous, PRN  Allergies:  Cefepime and Toradol [ketorolac tromethamine]    Social History:    TOBACCO:   reports that she quit smoking about 13 months ago. Her smoking use included cigarettes. She has a 3.00 pack-year smoking history. She has never used smokeless tobacco.  ETOH:   reports no history of alcohol use.     Family History:       Problem Relation Age of Onset    Asthma Mother     Hypertension Mother     High Blood Pressure Mother     Diabetes Mother     Asthma Brother    Diana Prieto High Blood Pressure Father      REVIEW OF SYSTEMS:    Review of systems not obtained due to patient factors - intubation  PHYSICAL EXAM:      Vitals:    VITALS:  BP 93/73   Pulse 93   Temp 97.1 °F (36.2 °C) (Infrared)   Resp 13   Ht 5' 4\" (1.626 m)   Wt 138 lb (62.6 kg)   SpO2 100%   BMI 23.69 kg/m²   24HR INTAKE/OUTPUT:    Intake/Output Summary (Last 24 hours) at 3/28/2022 1025  Last data filed at 3/28/2022 0800  Gross per 24 hour   Intake 238 ml   Output 80 ml   Net 158 ml       Access: RIJ tunneled dialysis catheter  Constitutional: Sedated on ventilator  HEENT:  PERRL, normocephalic, atraumatic  Respiratory:  CTA bilateral, on mechanical ventilator  Cardiovascular/Edema:  ST, RRR, no murmur gallop or rub  Gastrointestinal:  abd distended, c/o persistent abdominal pain  Neurologic: Sedated  Skin:  Warm, dry, ecchymotic areas to abdomen  Other:      DATA:    CBC:   Lab Results   Component Value Date    WBC 6.0 03/28/2022    RBC 2.80 03/28/2022    HGB 7.5 03/28/2022    HCT 22.9 03/28/2022    MCV 81.8 03/28/2022    MCH 26.8 03/28/2022    MCHC 32.8 03/28/2022    RDW 17.2 03/28/2022     03/28/2022    MPV 10.4 03/28/2022     CMP:    Lab Results   Component Value Date     03/28/2022    K 4.7 03/28/2022    K 3.7 03/04/2022     03/28/2022    CO2 22 03/28/2022    BUN 38 03/28/2022    CREATININE 4.7 03/28/2022    GFRAA 13 03/28/2022    LABGLOM 13 03/28/2022    GLUCOSE 92 03/28/2022    GLUCOSE 130 05/18/2012    PROT 6.2 03/28/2022    LABALBU 2.1 03/28/2022    LABALBU 4.1 05/18/2012    CALCIUM 7.9 03/28/2022    BILITOT 0.5 03/28/2022    ALKPHOS 210 03/28/2022    AST 58 03/28/2022    ALT 28 03/28/2022     Magnesium:    Lab Results   Component Value Date    MG 1.9 03/28/2022     Phosphorus:    Lab Results   Component Value Date    PHOS 3.9 03/28/2022     Radiology Review:      CT Head WO Contrast March 27, 2022   No acute intracranial abnormality or hemorrhage.         CT Abdomen Pelvis WO Contrast March 27, 2022   1.  Moderate ascites throughout abdomen and pelvis.       2.  Multiple thick walled loops of small bowel throughout the abdomen could   suggest nonspecific infectious or inflammatory enteritis.       3.  Generalized body wall edema.       4.  Small bilateral pleural effusions with adjacent atelectatic changes.             Chest X-Ray March 28, 2022   Infiltrate and or atelectasis at the left lower lobe.  Substantially resolved   infiltrate and or atelectasis on the right.  New NG tube.               BRIEF SUMMARY OF INITIAL CONSULT:    Briefly, Miss Genoveva Lozano is a 36year-old female with a PMH of ESRD on hemodialysis 3 days/wk, on TTS schedule at [facility] I DM, poorly controlled with recurrent admissions for DKA, HTN, gastroparesis, ascites, infective endocarditis, cardiac arrest, hypothyroidism and depression who was admitted on March 27, 2022 after she was found unresponsive at the SNF where she resides. Patient was found to be profoundly hypoglycemic and EMS was called. She was intubated in ER for airway protection as she remained unresponsive. CT head showed no acute intracranial abnormality or hemorrhage, chest x-ray demonstrates right perihilar opacity concerning for aspiration pneumonia. She was ultimately admitted to MICU for further evaluation. Labs on admission were significant for sodium 135, potassium 5.0, chloride 100, bicarbonate 25, BUN 38, creatinine 4.9, proBNP >70,000. We are consulted for dialysis management. IMPRESSION/RECOMMENDATIONS:      ESRD 3 times a week TTS via RIJ tunneled dialysis catheter. HD initiated September 2021 after failed peritoneal dialysis with persistent hyperkalemia. For dialysis three times/wk TTS while inpatient.  Patient did not have dialysis Saturday, will dialyze today.     HTN, on lopressor and amlodipine at home  Vitamin D deficiency, on ergocalciferol at home  MBD of CKD, on sevelamer (hold until phosphorus level obtained)  Anemia of CKD, Hgb 6.5 mg/dL on admission, continue epoetin alpha 3,000 unit 3 times/wk. S/p transfusion  Type I DM, poorly controlled with frequent readmissions for DKA, on D10 at this time for hypoglycemia  Hypoglycemia, on I19  Acute metabolic encephalopathy 2/2 hypoglycemia  ------------------------------------------------------  Aspiration pneumonia? On piperacillin and Flagyl   Acute respiratory failure, 2/2 aspiration pneumonia? On 40% Fio2  Recent Covid 19 infection, unvaccinated  Restless leg syndrome, on ropinirole at home  Depression, on escitalopram and Abilify  Hypothyroidism, on levothyroxine   History of gastroparesis, on metoclopramide. For EGD and pyloric dilatation with Botox injection outpatient per GI. Hx recurrent C. difficile infection  Hx of MDRO UTI  Nutrition, NPO     Plan:     Continue HD today and TTS while inpatient, seen on dialysis and tolerating well  Continue epoetin alpha 3,000 units 3 times/wk  Obtain phosphorus levels  Obtain ionized calcium in am  Monitor labs daily  Monitor glucose levels  Monitor neurological status, appears waking up and responding to verbal command with decreased sedation      Case and plan discussed with Dr. Carter Flores      Thank you Dr. Kenny Choi for allowing us to participate in the care of Ms. [address] [facility]  Electronically signed by HEBER Ann CNP on 3/28/2022 at 10:26 AM  Patient seen and examined and discussed with medical staff.   Agree with above as annotated  Joey SPRINGER

## 2022-03-28 NOTE — PROGRESS NOTES
[facility]  Internal Medicine Clinic    Attending Physician Statement:  Caitlin Casanova D.O. I have discussed the case, including pertinent history and exam findings with the resident. I agree with the assessment, plan and orders as documented by the resident. Patient here for routine follow up of medical problems. Acute metabolic encephalopathy: concerns for hypoglycemia induced brain injury; patient has continuous hypoglycemia overnight which was minimally responsive to D10; consider further imaging and w/u for the encephalopathy per ICU    Hypoglycemia: blood work ordered; AI evaluation ordered per endocrinology     End-stage renal disease on hemodialysis: Nephrology consulted and for dialysis on a TTS schedule, care per their recommendations, patient received hemodialysis this a.m. Aspiration pneumonia: Infectious disease consulted and currently receiving Zosyn    History of recurrent C. difficile infections: Patient currently receiving Flagyl per infectious disease recommendations    Remainder of medical problems as per resident note.

## 2022-03-28 NOTE — PROGRESS NOTES
Pharmacy Consultation Note  (Antibiotic Dosing and Monitoring)    · Vancomycin has been discontinued. Clinical pharmacy will sign off, please reconsult if further assistance is needed.        Emanuel Barros, [facility] 3/28/2022 9:11 AM

## 2022-03-28 NOTE — PROGRESS NOTES
DR. Maxime Arevalo NOTIFIED OF CONSULT. TO CONTACT DIALYSIS NURSE AT 0600 AND RELAY MESSAGE TO CALL DR SMITH FOR ORDERS TO DIALYZE PATIENT.

## 2022-03-29 ENCOUNTER — APPOINTMENT (OUTPATIENT)
Dept: GENERAL RADIOLOGY | Age: 32
DRG: 420 | End: 2022-03-29
Payer: COMMERCIAL

## 2022-03-29 LAB
AADO2: 141.9 MMHG
ALBUMIN SERPL-MCNC: 2.2 G/DL (ref 3.5–5.2)
ALP BLD-CCNC: 206 U/L (ref 35–104)
ALT SERPL-CCNC: 21 U/L (ref 0–32)
ANION GAP SERPL CALCULATED.3IONS-SCNC: 12 MMOL/L (ref 7–16)
ANION GAP SERPL CALCULATED.3IONS-SCNC: 17 MMOL/L (ref 7–16)
ANION GAP SERPL CALCULATED.3IONS-SCNC: 21 MMOL/L (ref 7–16)
ANION GAP SERPL CALCULATED.3IONS-SCNC: 9 MMOL/L (ref 7–16)
ANISOCYTOSIS: ABNORMAL
AST SERPL-CCNC: 21 U/L (ref 0–31)
B.E.: -8.2 MMOL/L (ref -3–3)
BASOPHILIC STIPPLING: ABNORMAL
BASOPHILS ABSOLUTE: 0 E9/L (ref 0–0.2)
BASOPHILS RELATIVE PERCENT: 0.2 % (ref 0–2)
BETA-HYDROXYBUTYRATE: >4.5 MMOL/L (ref 0.02–0.27)
BILIRUB SERPL-MCNC: 0.3 MG/DL (ref 0–1.2)
BILIRUBIN DIRECT: <0.2 MG/DL (ref 0–0.3)
BILIRUBIN, INDIRECT: ABNORMAL MG/DL (ref 0–1)
BUN BLDV-MCNC: 11 MG/DL (ref 6–20)
BUN BLDV-MCNC: 22 MG/DL (ref 6–20)
BUN BLDV-MCNC: 25 MG/DL (ref 6–20)
BUN BLDV-MCNC: 9 MG/DL (ref 6–20)
BURR CELLS: ABNORMAL
CALCIUM IONIZED: 1.17 MMOL/L (ref 1.15–1.33)
CALCIUM SERPL-MCNC: 7.8 MG/DL (ref 8.6–10.2)
CALCIUM SERPL-MCNC: 7.9 MG/DL (ref 8.6–10.2)
CALCIUM SERPL-MCNC: 8 MG/DL (ref 8.6–10.2)
CALCIUM SERPL-MCNC: 8.1 MG/DL (ref 8.6–10.2)
CHLORIDE BLD-SCNC: 100 MMOL/L (ref 98–107)
CHLORIDE BLD-SCNC: 93 MMOL/L (ref 98–107)
CHLORIDE BLD-SCNC: 95 MMOL/L (ref 98–107)
CHLORIDE BLD-SCNC: 98 MMOL/L (ref 98–107)
CO2: 16 MMOL/L (ref 22–29)
CO2: 19 MMOL/L (ref 22–29)
CO2: 26 MMOL/L (ref 22–29)
CO2: 27 MMOL/L (ref 22–29)
COHB: 1 % (ref 0–1.5)
CORTISOL TOTAL: 1.4 MCG/DL (ref 2.68–18.4)
CORTISOL TOTAL: 122.6 MCG/DL (ref 2.68–18.4)
CORTISOL TOTAL: 92.9 MCG/DL (ref 2.68–18.4)
CORTISOL TOTAL: 93.1 MCG/DL (ref 2.68–18.4)
CREAT SERPL-MCNC: 1.3 MG/DL (ref 0.5–1)
CREAT SERPL-MCNC: 1.6 MG/DL (ref 0.5–1)
CREAT SERPL-MCNC: 2.8 MG/DL (ref 0.5–1)
CREAT SERPL-MCNC: 2.9 MG/DL (ref 0.5–1)
CRITICAL: ABNORMAL
DATE ANALYZED: ABNORMAL
DATE OF COLLECTION: ABNORMAL
EOSINOPHILS ABSOLUTE: 0 E9/L (ref 0.05–0.5)
EOSINOPHILS RELATIVE PERCENT: 0 % (ref 0–6)
FIO2: 40 %
GFR AFRICAN AMERICAN: 23
GFR AFRICAN AMERICAN: 24
GFR AFRICAN AMERICAN: 46
GFR AFRICAN AMERICAN: 58
GFR NON-AFRICAN AMERICAN: 23 ML/MIN/1.73
GFR NON-AFRICAN AMERICAN: 24 ML/MIN/1.73
GFR NON-AFRICAN AMERICAN: 46 ML/MIN/1.73
GFR NON-AFRICAN AMERICAN: 58 ML/MIN/1.73
GLUCOSE BLD-MCNC: 126 MG/DL (ref 74–99)
GLUCOSE BLD-MCNC: 247 MG/DL (ref 74–99)
GLUCOSE BLD-MCNC: 448 MG/DL (ref 74–99)
GLUCOSE BLD-MCNC: 528 MG/DL (ref 74–99)
HCO3: 17.3 MMOL/L (ref 22–26)
HCT VFR BLD CALC: 23.8 % (ref 34–48)
HCT VFR BLD CALC: 25 % (ref 34–48)
HCT VFR BLD CALC: 25.6 % (ref 34–48)
HEMOGLOBIN: 7.5 G/DL (ref 11.5–15.5)
HEMOGLOBIN: 7.9 G/DL (ref 11.5–15.5)
HEMOGLOBIN: 8.1 G/DL (ref 11.5–15.5)
HHB: 3.2 % (ref 0–5)
HYPOCHROMIA: ABNORMAL
LAB: ABNORMAL
LACTIC ACID: 1.7 MMOL/L (ref 0.5–2.2)
LYMPHOCYTES ABSOLUTE: 0.12 E9/L (ref 1.5–4)
LYMPHOCYTES RELATIVE PERCENT: 0.9 % (ref 20–42)
Lab: ABNORMAL
MAGNESIUM: 1.8 MG/DL (ref 1.6–2.6)
MCH RBC QN AUTO: 26.2 PG (ref 26–35)
MCHC RBC AUTO-ENTMCNC: 31.6 % (ref 32–34.5)
MCV RBC AUTO: 82.8 FL (ref 80–99.9)
METER GLUCOSE: 107 MG/DL (ref 74–99)
METER GLUCOSE: 119 MG/DL (ref 74–99)
METER GLUCOSE: 125 MG/DL (ref 74–99)
METER GLUCOSE: 133 MG/DL (ref 74–99)
METER GLUCOSE: 138 MG/DL (ref 74–99)
METER GLUCOSE: 142 MG/DL (ref 74–99)
METER GLUCOSE: 177 MG/DL (ref 74–99)
METER GLUCOSE: 201 MG/DL (ref 74–99)
METER GLUCOSE: 215 MG/DL (ref 74–99)
METER GLUCOSE: 263 MG/DL (ref 74–99)
METER GLUCOSE: 353 MG/DL (ref 74–99)
METER GLUCOSE: 362 MG/DL (ref 74–99)
METER GLUCOSE: 446 MG/DL (ref 74–99)
METER GLUCOSE: 447 MG/DL (ref 74–99)
METER GLUCOSE: 475 MG/DL (ref 74–99)
METER GLUCOSE: 478 MG/DL (ref 74–99)
METER GLUCOSE: 95 MG/DL (ref 74–99)
METHB: 0.3 % (ref 0–1.5)
MODE: AC
MONOCYTES ABSOLUTE: 0 E9/L (ref 0.1–0.95)
MONOCYTES RELATIVE PERCENT: 0.6 % (ref 2–12)
MRSA CULTURE ONLY: NORMAL
NEUTROPHILS ABSOLUTE: 12.18 E9/L (ref 1.8–7.3)
NEUTROPHILS RELATIVE PERCENT: 99.1 % (ref 43–80)
O2 CONTENT: 11.8 ML/DL
O2 SATURATION: 96.8 % (ref 92–98.5)
O2HB: 95.5 % (ref 94–97)
OPERATOR ID: 2593
OVALOCYTES: ABNORMAL
PATIENT TEMP: 37 C
PCO2: 35 MMHG (ref 35–45)
PDW BLD-RTO: 17.6 FL (ref 11.5–15)
PEEP/CPAP: 8 CMH2O
PFO2: 2.33 MMHG/%
PH BLOOD GAS: 7.31 (ref 7.35–7.45)
PHOSPHORUS: 4.7 MG/DL (ref 2.5–4.5)
PLATELET # BLD: 260 E9/L (ref 130–450)
PMV BLD AUTO: 10.6 FL (ref 7–12)
PO2: 93.1 MMHG (ref 75–100)
POIKILOCYTES: ABNORMAL
POLYCHROMASIA: ABNORMAL
POTASSIUM SERPL-SCNC: 3.2 MMOL/L (ref 3.5–5)
POTASSIUM SERPL-SCNC: 4.4 MMOL/L (ref 3.5–5)
POTASSIUM SERPL-SCNC: 4.9 MMOL/L (ref 3.5–5)
POTASSIUM SERPL-SCNC: 5 MMOL/L (ref 3.5–5)
RBC # BLD: 3.02 E12/L (ref 3.5–5.5)
RI(T): 1.52
RR MECHANICAL: 12 B/MIN
SCHISTOCYTES: ABNORMAL
SODIUM BLD-SCNC: 129 MMOL/L (ref 132–146)
SODIUM BLD-SCNC: 132 MMOL/L (ref 132–146)
SODIUM BLD-SCNC: 136 MMOL/L (ref 132–146)
SODIUM BLD-SCNC: 136 MMOL/L (ref 132–146)
SOURCE, BLOOD GAS: ABNORMAL
TARGET CELLS: ABNORMAL
THB: 8.7 G/DL (ref 11.5–16.5)
TIME ANALYZED: 456
TOTAL PROTEIN: 6.3 G/DL (ref 6.4–8.3)
VANCOMYCIN RANDOM: 15 MCG/ML (ref 5–40)
VT MECHANICAL: 350 ML
WBC # BLD: 12.3 E9/L (ref 4.5–11.5)

## 2022-03-29 PROCEDURE — 6360000002 HC RX W HCPCS: Performed by: INTERNAL MEDICINE

## 2022-03-29 PROCEDURE — 83605 ASSAY OF LACTIC ACID: CPT

## 2022-03-29 PROCEDURE — 36415 COLL VENOUS BLD VENIPUNCTURE: CPT

## 2022-03-29 PROCEDURE — 82805 BLOOD GASES W/O2 SATURATION: CPT

## 2022-03-29 PROCEDURE — 2580000003 HC RX 258: Performed by: INTERNAL MEDICINE

## 2022-03-29 PROCEDURE — 82010 KETONE BODYS QUAN: CPT

## 2022-03-29 PROCEDURE — 6370000000 HC RX 637 (ALT 250 FOR IP): Performed by: INTERNAL MEDICINE

## 2022-03-29 PROCEDURE — 36592 COLLECT BLOOD FROM PICC: CPT

## 2022-03-29 PROCEDURE — 80076 HEPATIC FUNCTION PANEL: CPT

## 2022-03-29 PROCEDURE — 2500000003 HC RX 250 WO HCPCS: Performed by: INTERNAL MEDICINE

## 2022-03-29 PROCEDURE — 82962 GLUCOSE BLOOD TEST: CPT

## 2022-03-29 PROCEDURE — 80202 ASSAY OF VANCOMYCIN: CPT

## 2022-03-29 PROCEDURE — 82024 ASSAY OF ACTH: CPT

## 2022-03-29 PROCEDURE — 90935 HEMODIALYSIS ONE EVALUATION: CPT

## 2022-03-29 PROCEDURE — 83735 ASSAY OF MAGNESIUM: CPT

## 2022-03-29 PROCEDURE — 85014 HEMATOCRIT: CPT

## 2022-03-29 PROCEDURE — 80048 BASIC METABOLIC PNL TOTAL CA: CPT

## 2022-03-29 PROCEDURE — 6370000000 HC RX 637 (ALT 250 FOR IP): Performed by: STUDENT IN AN ORGANIZED HEALTH CARE EDUCATION/TRAINING PROGRAM

## 2022-03-29 PROCEDURE — 82330 ASSAY OF CALCIUM: CPT

## 2022-03-29 PROCEDURE — S5553 INSULIN LONG ACTING 5 U: HCPCS | Performed by: INTERNAL MEDICINE

## 2022-03-29 PROCEDURE — 85018 HEMOGLOBIN: CPT

## 2022-03-29 PROCEDURE — 71045 X-RAY EXAM CHEST 1 VIEW: CPT

## 2022-03-29 PROCEDURE — 2580000003 HC RX 258: Performed by: STUDENT IN AN ORGANIZED HEALTH CARE EDUCATION/TRAINING PROGRAM

## 2022-03-29 PROCEDURE — 99232 SBSQ HOSP IP/OBS MODERATE 35: CPT | Performed by: INTERNAL MEDICINE

## 2022-03-29 PROCEDURE — 85025 COMPLETE CBC W/AUTO DIFF WBC: CPT

## 2022-03-29 PROCEDURE — 6370000000 HC RX 637 (ALT 250 FOR IP): Performed by: NURSE PRACTITIONER

## 2022-03-29 PROCEDURE — 84100 ASSAY OF PHOSPHORUS: CPT

## 2022-03-29 PROCEDURE — 2000000000 HC ICU R&B

## 2022-03-29 PROCEDURE — 5A1D70Z PERFORMANCE OF URINARY FILTRATION, INTERMITTENT, LESS THAN 6 HOURS PER DAY: ICD-10-PCS | Performed by: INTERNAL MEDICINE

## 2022-03-29 PROCEDURE — 94003 VENT MGMT INPAT SUBQ DAY: CPT

## 2022-03-29 PROCEDURE — 82533 TOTAL CORTISOL: CPT

## 2022-03-29 RX ORDER — DEXTROSE, SODIUM CHLORIDE, AND POTASSIUM CHLORIDE 5; .45; .15 G/100ML; G/100ML; G/100ML
INJECTION INTRAVENOUS CONTINUOUS PRN
Status: DISCONTINUED | OUTPATIENT
Start: 2022-03-29 | End: 2022-03-29

## 2022-03-29 RX ORDER — POTASSIUM CHLORIDE 29.8 MG/ML
40 INJECTION INTRAVENOUS ONCE
Status: DISCONTINUED | OUTPATIENT
Start: 2022-03-29 | End: 2022-03-29

## 2022-03-29 RX ORDER — INSULIN GLARGINE-YFGN 100 [IU]/ML
4 INJECTION, SOLUTION SUBCUTANEOUS EVERY MORNING
Status: DISCONTINUED | OUTPATIENT
Start: 2022-03-29 | End: 2022-03-30

## 2022-03-29 RX ORDER — DEXTROSE AND SODIUM CHLORIDE 5; .45 G/100ML; G/100ML
INJECTION, SOLUTION INTRAVENOUS CONTINUOUS
Status: ACTIVE | OUTPATIENT
Start: 2022-03-29 | End: 2022-03-30

## 2022-03-29 RX ORDER — POTASSIUM CHLORIDE 29.8 MG/ML
40 INJECTION INTRAVENOUS ONCE
Status: COMPLETED | OUTPATIENT
Start: 2022-03-29 | End: 2022-03-29

## 2022-03-29 RX ORDER — POTASSIUM CHLORIDE 7.45 MG/ML
10 INJECTION INTRAVENOUS PRN
Status: DISCONTINUED | OUTPATIENT
Start: 2022-03-29 | End: 2022-03-29

## 2022-03-29 RX ORDER — 0.9 % SODIUM CHLORIDE 0.9 %
15 INTRAVENOUS SOLUTION INTRAVENOUS ONCE
Status: DISCONTINUED | OUTPATIENT
Start: 2022-03-29 | End: 2022-03-29

## 2022-03-29 RX ORDER — SODIUM CHLORIDE 450 MG/100ML
INJECTION, SOLUTION INTRAVENOUS CONTINUOUS
Status: DISCONTINUED | OUTPATIENT
Start: 2022-03-29 | End: 2022-03-29

## 2022-03-29 RX ORDER — MAGNESIUM SULFATE 1 G/100ML
1000 INJECTION INTRAVENOUS PRN
Status: DISCONTINUED | OUTPATIENT
Start: 2022-03-29 | End: 2022-03-29

## 2022-03-29 RX ADMIN — COSYNTROPIN 250 MCG: 0.25 INJECTION, POWDER, LYOPHILIZED, FOR SOLUTION INTRAVENOUS at 07:28

## 2022-03-29 RX ADMIN — FAMOTIDINE 10 MG: 20 TABLET ORAL at 08:59

## 2022-03-29 RX ADMIN — ESCITALOPRAM 10 MG: 10 TABLET, FILM COATED ORAL at 09:00

## 2022-03-29 RX ADMIN — 0.12% CHLORHEXIDINE GLUCONATE 15 ML: 1.2 RINSE ORAL at 20:02

## 2022-03-29 RX ADMIN — DEXTROSE AND SODIUM CHLORIDE: 5; 450 INJECTION, SOLUTION INTRAVENOUS at 21:00

## 2022-03-29 RX ADMIN — HYDROCORTISONE SODIUM SUCCINATE 100 MG: 100 INJECTION, POWDER, FOR SOLUTION INTRAMUSCULAR; INTRAVENOUS at 08:59

## 2022-03-29 RX ADMIN — METRONIDAZOLE 500 MG: 500 INJECTION, SOLUTION INTRAVENOUS at 11:38

## 2022-03-29 RX ADMIN — HYDROCORTISONE SODIUM SUCCINATE 100 MG: 100 INJECTION, POWDER, FOR SOLUTION INTRAMUSCULAR; INTRAVENOUS at 00:07

## 2022-03-29 RX ADMIN — 0.12% CHLORHEXIDINE GLUCONATE 15 ML: 1.2 RINSE ORAL at 08:59

## 2022-03-29 RX ADMIN — INSULIN LISPRO 2 UNITS: 100 INJECTION, SOLUTION INTRAVENOUS; SUBCUTANEOUS at 13:24

## 2022-03-29 RX ADMIN — POTASSIUM CHLORIDE 40 MEQ: 29.8 INJECTION, SOLUTION INTRAVENOUS at 14:22

## 2022-03-29 RX ADMIN — SODIUM CHLORIDE, PRESERVATIVE FREE 10 ML: 5 INJECTION INTRAVENOUS at 20:02

## 2022-03-29 RX ADMIN — ROPINIROLE 0.25 MG: 0.25 TABLET, FILM COATED ORAL at 19:59

## 2022-03-29 RX ADMIN — Medication 50 MCG/HR: at 18:56

## 2022-03-29 RX ADMIN — SODIUM CHLORIDE, PRESERVATIVE FREE 10 ML: 5 INJECTION INTRAVENOUS at 09:00

## 2022-03-29 RX ADMIN — METRONIDAZOLE 500 MG: 500 INJECTION, SOLUTION INTRAVENOUS at 18:26

## 2022-03-29 RX ADMIN — MINERAL OIL AND WHITE PETROLATUM: 150; 830 OINTMENT OPHTHALMIC at 02:36

## 2022-03-29 RX ADMIN — HYDROCORTISONE SODIUM SUCCINATE 100 MG: 100 INJECTION, POWDER, FOR SOLUTION INTRAMUSCULAR; INTRAVENOUS at 16:20

## 2022-03-29 RX ADMIN — MAGNESIUM SULFATE HEPTAHYDRATE 1000 MG: 500 INJECTION, SOLUTION INTRAMUSCULAR; INTRAVENOUS at 09:17

## 2022-03-29 RX ADMIN — INSULIN LISPRO 6 UNITS: 100 INJECTION, SOLUTION INTRAVENOUS; SUBCUTANEOUS at 09:18

## 2022-03-29 RX ADMIN — MINERAL OIL AND WHITE PETROLATUM: 150; 830 OINTMENT OPHTHALMIC at 14:22

## 2022-03-29 RX ADMIN — DILTIAZEM HYDROCHLORIDE 90 MG: 30 TABLET, FILM COATED ORAL at 13:26

## 2022-03-29 RX ADMIN — DILTIAZEM HYDROCHLORIDE 90 MG: 30 TABLET, FILM COATED ORAL at 18:43

## 2022-03-29 RX ADMIN — LEVOTHYROXINE SODIUM 88 MCG: 0.09 TABLET ORAL at 05:31

## 2022-03-29 RX ADMIN — METRONIDAZOLE 500 MG: 500 INJECTION, SOLUTION INTRAVENOUS at 02:38

## 2022-03-29 RX ADMIN — MINERAL OIL AND WHITE PETROLATUM: 150; 830 OINTMENT OPHTHALMIC at 20:01

## 2022-03-29 RX ADMIN — Medication 1000 UNITS: at 08:59

## 2022-03-29 RX ADMIN — MINERAL OIL AND WHITE PETROLATUM: 150; 830 OINTMENT OPHTHALMIC at 09:00

## 2022-03-29 RX ADMIN — MIRTAZAPINE 15 MG: 15 TABLET, FILM COATED ORAL at 19:59

## 2022-03-29 RX ADMIN — PIPERACILLIN AND TAZOBACTAM 3375 MG: 3; .375 INJECTION, POWDER, LYOPHILIZED, FOR SOLUTION INTRAVENOUS at 08:58

## 2022-03-29 RX ADMIN — INSULIN GLARGINE-YFGN 4 UNITS: 100 INJECTION, SOLUTION SUBCUTANEOUS at 09:18

## 2022-03-29 RX ADMIN — HEPARIN SODIUM 5000 UNITS: 10000 INJECTION INTRAVENOUS; SUBCUTANEOUS at 05:31

## 2022-03-29 RX ADMIN — ARIPIPRAZOLE 5 MG: 5 TABLET ORAL at 20:01

## 2022-03-29 RX ADMIN — PIPERACILLIN AND TAZOBACTAM 3375 MG: 3; .375 INJECTION, POWDER, LYOPHILIZED, FOR SOLUTION INTRAVENOUS at 20:18

## 2022-03-29 ASSESSMENT — PULMONARY FUNCTION TESTS
PIF_VALUE: 20
PIF_VALUE: 16
PIF_VALUE: 19
PIF_VALUE: 14
PIF_VALUE: 20
PIF_VALUE: 19
PIF_VALUE: 21
PIF_VALUE: 20
PIF_VALUE: 13
PIF_VALUE: 20
PIF_VALUE: 19
PIF_VALUE: 21
PIF_VALUE: 26
PIF_VALUE: 22
PIF_VALUE: 24
PIF_VALUE: 19
PIF_VALUE: 20
PIF_VALUE: 21
PIF_VALUE: 19
PIF_VALUE: 22
PIF_VALUE: 19
PIF_VALUE: 19
PIF_VALUE: 20
PIF_VALUE: 19

## 2022-03-29 ASSESSMENT — PAIN SCALES - GENERAL
PAINLEVEL_OUTOF10: 0
PAINLEVEL_OUTOF10: 0
PAINLEVEL_OUTOF10: 5
PAINLEVEL_OUTOF10: 0

## 2022-03-29 NOTE — PLAN OF CARE
Problem: Breathing Pattern - Ineffective:  Goal: Ability to achieve and maintain a regular respiratory rate will improve  Description: Ability to achieve and maintain a regular respiratory rate will improve  Outcome: Met This Shift     Problem: Cardiac Output - Decreased:  Goal: Hemodynamic stability will improve  Description: Hemodynamic stability will improve  Outcome: Met This Shift     Problem: Diarrhea:  Goal: Bowel elimination is within specified parameters  Description: Bowel elimination is within specified parameters  Outcome: Met This Shift

## 2022-03-29 NOTE — PROGRESS NOTES
[facility]  Internal Medicine Residency Program  Progress Note - House Team     Patient:  Gordon Matias 36 y.o. female MRN: [id number]     Date of Service: 3/29/2022     CC: Altered mental status    Subjective   Brief Summary:  Ms. Yaw Babin is a 36year old female who was found obtunded and severely hypoglycemic at her nursing home on 3/27. She has a past medical history of ESRD on hemodialysis, poorly controlled TIDM, HTN, hypothyroidism, and depression. She was intubated for airway protection and admitted to the ICU. Hospital Day: 3    Overnight Events:       This AM     Patient was seen and examined this morning at bedside   Patient is sedated and intubated. She is alert and able to follow commands. She endorses no pain or discomfort.      Objective     Physical Exam:   · Vitals: BP (!) 145/95   Pulse 105   Temp 96.8 °F (36 °C)   Resp (!) 5   Ht 5' 4\" (1.626 m)   Wt 149 lb 11.1 oz (67.9 kg)   SpO2 100%   BMI 25.69 kg/m²     · I & O - 24hr: I/O this shift:  · In: 600   · Out: 3600    · General Appearance: alert, appears stated age, cooperative, mild distress and moderately obese  · Lung: clear to auscultation bilaterally  · Heart: tachycardic, regular rhythm, S1, S2 normal, no murmur, click, rub or gallop  · Abdomen: soft, non-tender; bowel sounds normal; no masses,  no organomegaly  · Neurologic: Mental status: alertness: alert  Subject  Pertinent Labs & Imaging Studies   jorge alberto  CBC:   Lab Results   Component Value Date    WBC 12.3 03/29/2022    RBC 3.02 03/29/2022    HGB 8.1 03/29/2022    HCT 25.6 03/29/2022    MCV 82.8 03/29/2022    MCH 26.2 03/29/2022    MCHC 31.6 03/29/2022    RDW 17.6 03/29/2022     03/29/2022    MPV 10.6 03/29/2022     CMP:    Lab Results   Component Value Date     03/29/2022    K 3.2 03/29/2022    K 3.7 03/04/2022    CL 98 03/29/2022    CO2 26 03/29/2022    BUN 9 03/29/2022    CREATININE 1.3 03/29/2022    GFRAA 58 03/29/2022 LABGLOM 58 03/29/2022    GLUCOSE 247 03/29/2022    GLUCOSE 130 05/18/2012    PROT 6.3 03/29/2022    LABALBU 2.2 03/29/2022    LABALBU 4.1 05/18/2012    CALCIUM 7.8 03/29/2022    BILITOT 0.3 03/29/2022    ALKPHOS 206 03/29/2022    AST 21 03/29/2022    ALT 21 03/29/2022     BMP:    Lab Results   Component Value Date     03/29/2022    K 3.2 03/29/2022    K 3.7 03/04/2022    CL 98 03/29/2022    CO2 26 03/29/2022    BUN 9 03/29/2022    LABALBU 2.2 03/29/2022    LABALBU 4.1 05/18/2012    CREATININE 1.3 03/29/2022    CALCIUM 7.8 03/29/2022    GFRAA 58 03/29/2022    LABGLOM 58 03/29/2022    GLUCOSE 247 03/29/2022    GLUCOSE 130 05/18/2012     Cortisol- 92.9 on 3/29  5.72 on 3/28      XR CHEST PORTABLE   Final Result   1. There is no acute cardiopulmonary disease   2. Stable position of the support lines and tubes. XR CHEST PORTABLE   Final Result   Infiltrate and or atelectasis at the left lower lobe. Substantially resolved   infiltrate and or atelectasis on the right. New NG tube. CT HEAD WO CONTRAST   Final Result   No acute intracranial abnormality or hemorrhage. CT ABDOMEN PELVIS WO CONTRAST Additional Contrast? None   Final Result   1. Moderate ascites throughout abdomen and pelvis. 2.  Multiple thick walled loops of small bowel throughout the abdomen could   suggest nonspecific infectious or inflammatory enteritis. 3.  Generalized body wall edema. 4.  Small bilateral pleural effusions with adjacent atelectatic changes. XR ABDOMEN FOR NG/OG/NE TUBE PLACEMENT   Final Result   Enteric tube tip in the body of the stomach. XR CHEST PORTABLE   Final Result   Right perihilar opacity. XR CHEST PORTABLE    (Results Pending)   XR CHEST PORTABLE    (Results Pending)   XR CHEST PORTABLE    (Results Pending)        Resident's Assessment and Plan     Assessment and Plan:    1. Altered Mental Status   - Suspected secondary to severe hypoglycemia.    - patient blood glucose improved after administration of hydrocortisone. - mental status improving, patient awake and able to follow commands  - cosyntropin test results inconsistent. Cortisol reduced from 144 to 1.4 following administration of cosyntropin. - serum insulin and c-peptide levels pending.  - blood cultures pending  - consider weaning off of ventilator as tolerated. - continue hydrocortisone    2. TIDM  - blood glucose rapidly increased to 478 this AM.  - pt highly sensitive to insulin. - will cautiously use insulin to control sugar levels. 3. Aspiration pneumonia  - continue IV zosyn per ID recs. 4. Recurrent C. Difficile  - continue flagyl daily. 5. End Stage Renal Disease  - Continue dialysis Tues/Thurs/Saturday    6. Hypothyroidism  - continue levothyroxine daily.      7. Depression  - continue Abilify and Celexa      PT/OT evaluation:   DVT prophylaxis/ GI prophylaxis: Heparin/Famotidine  Disposition: continue current care     Larisa Bhandari, MS4  Attending physician: Dr. Bebeto Shin

## 2022-03-29 NOTE — PROGRESS NOTES
[address] [facility]   Department of Internal Medicine   Internal Medicine Residency  MICU Progress Note    Patient:  Catherine Painting 36 y.o. female   MRN: [id number]       Date of Service: 3/29/2022    Allergy: Cefepime and Toradol [ketorolac tromethamine]    Subjective:     Critical Care Daily Progress Note: 3/29/2022 1:56 PM    Patient was seen and examined this morning at beside  Waking up, follow simple command   Hyperglycemia 470+ overnight, D5 was stopped   Family updated at bedside yesterday   Labile blood sugar  Discussed with Dr. Keisha Herndon and Dr. Kelly Garcia     24 hour change: Stable overnight. No acute overnight issues reported. Objective     Vitals reviewed. I/O last 3 completed shifts: In: 2302.1 [I.V.:1523.7; NG/GT:240; IV Piggyback:538.4]  Out: 1405 [Urine:105]  I/O this shift:  In: 600   Out: 3600     TEMPERATURE:  Current - Temp: 96.8 °F (36 °C); Max - Temp  Av.7 °F (35.9 °C)  Min: 95.7 °F (35.4 °C)  Max: 97.4 °F (36.3 °C)  RESPIRATIONS RANGE: Resp  Av.6  Min: 5  Max: 24  PULSE RANGE: Pulse  Av.2  Min: 88  Max: 112  BLOOD PRESSURE RANGE:  Systolic (33LOM), JEAN:761 , Min:93 , NLW:481   ; Diastolic (88KTQ), MU, Min:66, Max:109    PULSE OXIMETRY RANGE: SpO2  Av.8 %  Min: 98 %  Max: 100 %    I & O - 24hr:    Intake/Output Summary (Last 24 hours) at 3/29/2022 1356  Last data filed at 3/29/2022 1226  Gross per 24 hour   Intake 2664.14 ml   Output 3625 ml   Net -960.86 ml     I/O last 3 completed shifts: In: 2302.1 [I.V.:1523.7; NG/GT:240; IV Piggyback:538.4]  Out: 1405 [Urine:105] I/O this shift:  In: 600   Out: 3600    Weight change: 20 lb 11.2 oz (9.389 kg)    Physical Exam:  General Appearance: Alter, follow simple command waking up    HEENT:    NC/AT, mucous membranes are moist.   Neck:   Supple, no jugular venous distention. Resp:     CTAB, No wheezes, No rhonchi, no use of accessory muscles   Heart:    RRR, S1 and S2 normal, no murmur, rub or gallop. Abdomen:     Soft, non-tender, non-distended with normal bowel sounds   Extremities:   Atraumatic, no cyanosis or edema   Pulses:  Radial and pedal pulses are intact bilaterally   Neurologic:  Follow simple command        Medications:     Continuous Infusions:   dextrose      fentaNYL 5 mcg/ml in 0.9%  ml infusion 50 mcg/hr (03/29/22 0957)     Scheduled Meds:   dilTIAZem  90 mg Oral Q6H    insulin lispro  0-6 Units SubCUTAneous Q4H    insulin glargine-yfgn  4 Units SubCUTAneous QAM    potassium chloride  40 mEq IntraVENous Once    mirtazapine  15 mg Oral Nightly    escitalopram  10 mg Oral Daily    ARIPiprazole  5 mg Oral Nightly    rOPINIRole  0.25 mg Oral Nightly    sodium chloride flush  5-40 mL IntraVENous 2 times per day    piperacillin-tazobactam  3,375 mg IntraVENous Q12H    chlorhexidine  15 mL Mouth/Throat BID    artificial tears   Both Eyes Q6H    sodium polystyrene  15 g Oral Once    hydrocortisone sodium succinate PF  100 mg IntraVENous Q8H    metroNIDAZOLE  500 mg IntraVENous Q8H    levothyroxine  88 mcg Oral Daily    famotidine  10 mg Oral Daily    epoetin nena-epbx  3,000 Units SubCUTAneous Once per day on Mon Wed Fri    Vitamin D  1,000 Units Per NG tube Daily     PRN Meds: sodium chloride flush, polyethylene glycol, acetaminophen **OR** acetaminophen, glucose, dextrose, glucagon (rDNA), dextrose, albuterol, labetalol  Nutrition:   NG/OG tube TF type: Pulmocare/Nephro/Glucerna/Jevity           Labs and Imaging Studies:     CBC:   Recent Labs     03/28/22  0228 03/28/22  0228 03/28/22  0530 03/28/22  1455 03/29/22  0249 03/29/22  0500 03/29/22  0718   WBC 8.0  --  6.0  --   --  12.3*  --    HGB 7.9*   < > 7.5*   < > 7.5* 7.9* 8.1*   HCT 24.6*   < > 22.9*   < > 23.8* 25.0* 25.6*   MCV 81.2  --  81.8  --   --  82.8  --      --  244  --   --  260  --     < > = values in this interval not displayed.        BMP:    Recent Labs     03/29/22  0418 03/29/22  0830 03/29/22  1108   * 132 136   K 4.9 5.0 3.2*   CL 93* 95* 98   CO2 19* 16* 26   BUN 22* 25* 9   CREATININE 2.8* 2.9* 1.3*   GLUCOSE 448* 528* 247*       LIVER PROFILE:   Recent Labs     03/28/22  0228 03/28/22  0530 03/29/22  0418   AST 82* 58* 21   ALT 31 28 21   BILIDIR 0.2 0.2 <0.2   BILITOT 0.7 0.5 0.3   ALKPHOS 231* 210* 206*       PT/INR:   Recent Labs     03/27/22  1832   PROTIME 12.8*   INR 1.2       APTT:   Recent Labs     03/27/22  1832   APTT 34.1       Fasting Lipid Panel:    Lab Results   Component Value Date    CHOL 95 01/14/2020    TRIG 82 01/14/2020    HDL 33 01/14/2020       Cardiac Enzymes:    Lab Results   Component Value Date    CKTOTAL 22 11/22/2021    CKTOTAL 120 02/15/2021    CKTOTAL 99 11/04/2020    CKMB 2.1 10/31/2020    CKMB <1.0 03/24/2015    CKMB <1.0 03/23/2015    TROPONINI 0.12 (H) 05/14/2021    TROPONINI 0.22 (H) 02/15/2021    TROPONINI 0.13 (H) 12/26/2020       Notable Cultures:      Blood cultures   Blood Culture, Routine   Date Value Ref Range Status   03/28/2022 24 Hours no growth  Preliminary     Respiratory cultures No results found for: RESPCULTURE   Gram Stain Result   Date Value Ref Range Status   12/08/2021 Refer to ordered Gram stain for results  Final     Urine   Urine Culture, Routine   Date Value Ref Range Status   03/27/2022 Growth not present, incubation continues  Preliminary     Legionella No results found for: LABLEGI  C Diff PCR No results found for: CDIFPCR  Wound culture/abscess: No results for input(s): WNDABS in the last 72 hours. Tip culture:No results for input(s): CXCATHTIP in the last 72 hours.      Antibiotic  Days  Day started                                Oxygen:     Vent Information  $Ventilation: $Subsequent Day  Skin Assessment: Clean, dry, & intact  Equipment ID: [id number]  Vent Type: [phone]  Vent Mode: AC/VC  Vt Ordered: 330 mL  Rate Set: 12 bmp  Peak Flow: 50 L/min  Pressure Support: 0 cmH20  FiO2 : 40 %  SpO2: 100 %  SpO2/FiO2 ratio: 250  Sensitivity: 3  PEEP/CPAP: 8  I Time/ I Time %: 0 s  Humidification Source: Heated wire  Humidification Temp: 37  Humidification Temp Measured: 37  Circuit Condensation: Drained  Additional Respiratory  Assessments  Pulse: 97  Resp: 21  SpO2: 100 %  End Tidal CO2: 29 (%)  Humidification Source: Heated wire  Humidification Temp: 37  Circuit Condensation: Drained  Oral Care: Mouth suctioned  Subglottic Suction Done?: Yes  Airway Type: ET  Airway Size: 7.5 (24)  Cuff Pressure (cm H2O): 29 cm H2O     Nasal cannula L/min     Face mask %     Reservoirs mask %       ABG   Vent Settings     PH   Mode      PCO2   TV      PO2   RR      HCO3   PS      Sat%   PEEP      FIO2   FIO2        P/F          Lines:  Site  Day  Date inserted     TLC              PICC              Arterial line              Peripheral line              HD cath            [REMOVED] Urethral Catheter Straight-tip 16 fr-Output (mL): 5 mL  External Urinary Catheter-Output (mL): 5 mL      Brief Summary: The patient is a 36 y.o. female with PMH of uncontrolled T1DM (Hgb A1c 7.7%), ESRD on HD, hypothyroidism, prior endocarditis w/ septic emboli, anemia of chronic disease, prior MDRO UTI, and MRSA infection, who was brought in to the ED for acute encephalopathy. She was found to be lethargic and encephalopathic by employees of her SNF. POCT BG was collected, which read as too low to quantify on the meter. EMS was called, and the pt was transported to [facility] ED. On arrival to the ED, the pt was obtunded and unable to answer questions/follow commands. D/t concern for respiratory compromise and inability to protect her airway, she was urgently intubated for airway protection. Others work up in the ED, Hb 6.5, BNP 70K, troponin 206, CT shows small bowel wall thickening. Started on Vanc and zoysen and started in 100 cc/h. For further care and management she was transferred to MICU    Assessment:     Active problems   1.  Acute Metabolic Encephalopathy 2/2 hypoglycemia vs stroke vs seizure, resolving. She is waking up   2. Acute respiratory failure secondary to altered mental status. Concern for Aspiration pneumonia versus pneumonitis, on Zosyn    3. Brittle DMT1, labile blood sugar   4. DKA, transient. Gap is closed on repeat BMP  5. Extremely sensitive to insulin  6. Elevated Troponin 2/2 end-stage renal disease  7. Elevated proBNP secondary to ESRD disease  8. Prolong QTc   9. Hx Chronic c. Diff, on metronidazole per ID  10. Hx CKD 2/2 to DM. on dialysis TTS  11. Acute on chronic Anemia of Chronic Disease, s/p 1 unit of blood. Stable     Inactive problems:  12. Hx Septic Emboli  13. Hx of anxiety and depression   14. Hx Vitamin D deficiency  15. Hx of hypertension  16. Hx of COVID-19 pneumonia  17. Hx of MDRO UTI  18. Hx of Gastroparesis   19. Hx of hypothyroidism    20. Hx of restless leg syndrome     Plan:   · Failed weaning parameters will try tomorrow again  · Back to [facility]  · Keep her intubated, restart the fentanyl if needed   · On minimal sedation, monitor  · Follow ID and endo recs    · Follow pan culture   · Daily CXR and ABG   · Monitor vitals and blood sugar Q 1H  · If BG running low, start on D5 half normal saline   · Careful with insulin, unpredictable response to insulin   · Continue hydrocortisone   · On 4 lantus and LDSSI  · Follow fecal and gastric blood occult test     # Code Status: Full   # Peptic ulcer prophylaxis: Pepcid   # DVT Prophylaxis: PCD  # Disposition: Cont current care     Ron Lay MD M.D., PGY-2  Internal medicine resident  Attending Physician: Dr. Obey Carpenter     I personally saw, examined and provided care for the patient. Radiographs, labs and medication list were reviewed by me independently. I spoke with bedside nursing, therapists and consultants. Critical care services and times documented are independent of procedures and multidisciplinary rounds with Residents.  Additionally comprehensive, multidisciplinary rounds were conducted with the MICU team. The case was discussed in detail and plans for care were established. Review of Residents documentation was conducted and revisions were made as appropriate. I agree with the above documented exam, problem list and plan of care.   Sebastián Virk MD   CCT excluding procedures:36'

## 2022-03-29 NOTE — PROGRESS NOTES
[facility]  [facility]    Attending Physician Statement:  Chente Tran D.O. I have discussed the case, including pertinent history and exam findings with the resident. I agree with the assessment, plan and orders as documented by the resident. Patient here for routine follow up of medical problems. Acute metabolic encephalopathy: likely multifactorial at this point (hypoglycemia and infectious); patient mentation improving this AM after increase in glucose; further w/u per MICU     Hypoxic Respiratory Failure: 2/2 encephalopathy and PNA; antibiotics per ID and being weaned from ventilator by MICU; input appreciated     Hypoglycemia: blood work ordered; AI evaluation ordered per endocrinology     End-stage renal disease on hemodialysis: Nephrology consulted and for dialysis on a TTS schedule, care per their recommendations, patient received hemodialysis this a.m. Aspiration pneumonia: Infectious disease consulted and currently receiving Zosyn    History of recurrent C. difficile infections: Patient currently receiving Flagyl per infectious disease recommendations    Remainder of medical problems as per resident note.

## 2022-03-29 NOTE — PROGRESS NOTES
Department of Internal Medicine  Infectious Diseases  Progress  Note      C/C : Aspiration pneumonia     Pt is intubated, sedated, opened eyes to verbal stimuli   Afebrile      Current Facility-Administered Medications   Medication Dose Route Frequency Provider Last Rate Last Admin    dilTIAZem (CARDIZEM) tablet 90 mg  90 mg Oral Q6H Umer Hernandez MD        insulin lispro (HUMALOG) injection vial 0-6 Units  0-6 Units SubCUTAneous Q4H Violet Bruce MD   6 Units at 03/29/22 0918    magnesium sulfate 1,000 mg in sodium chloride 0.9 % 100 mL IVPB  1,000 mg IntraVENous Once Umer Hernandez MD 50 mL/hr at 03/29/22 0917 1,000 mg at 03/29/22 0917    insulin glargine-yfgn (SEMGLEE-YFGN) injection vial 4 Units  4 Units SubCUTAneous QAM Violet Bruce MD   4 Units at 03/29/22 7594    mirtazapine (REMERON) tablet 15 mg  15 mg Oral Nightly Jacinda L Littlejohn, DO   15 mg at 03/28/22 1952    escitalopram (LEXAPRO) tablet 10 mg  10 mg Oral Daily Jacinda L Littlejohn, DO   10 mg at 03/29/22 0900    ARIPiprazole (ABILIFY) tablet 5 mg  5 mg Oral Nightly Jacinda L Littlejohn, DO   5 mg at 03/28/22 1952    rOPINIRole (REQUIP) tablet 0.25 mg  0.25 mg Oral Nightly Jacinda L Littlejohn, DO   0.25 mg at 03/28/22 1952    sodium chloride flush 0.9 % injection 5-40 mL  5-40 mL IntraVENous 2 times per day Herschell Comes, DO   10 mL at 03/29/22 0900    sodium chloride flush 0.9 % injection 5-40 mL  5-40 mL IntraVENous PRN Jacinda L Littlejohn, DO        polyethylene glycol (GLYCOLAX) packet 17 g  17 g Oral Daily PRN Herschell Comes, DO        acetaminophen (TYLENOL) tablet 650 mg  650 mg Oral Q6H PRN Jacinda L Littlejohn, DO        Or    acetaminophen (TYLENOL) suppository 650 mg  650 mg Rectal Q6H PRN Jacinda L Littlejohn, DO        piperacillin-tazobactam (ZOSYN) 3,375 mg in dextrose 5 % 100 mL IVPB extended infusion (mini-bag)  3,375 mg IntraVENous Q12H Jacinda Littlejohn DO 25 mL/hr at 03/29/22 0858 3,375 mg at 03/29/22 0858    glucose (GLUTOSE) 40 % oral gel 15 g  15 g Oral PRN Jacinda L Littlejohn, DO        dextrose 50 % IV solution  12.5 g IntraVENous PRN Jacinda L Littlejohn, DO   12.5 g at 03/28/22 0358    glucagon (rDNA) injection 1 mg  1 mg IntraMUSCular PRN Jacinda L Littlejohn, DO        dextrose 5 % solution  100 mL/hr IntraVENous PRN Jacinda L Littlejohn, DO        fentaNYL 5 mcg/ml in 0.9%  ml infusion   mcg/hr IntraVENous Continuous Jacinda L Littlejohn, DO 10 mL/hr at 03/29/22 0957 50 mcg/hr at 03/29/22 0957    chlorhexidine (PERIDEX) 0.12 % solution 15 mL  15 mL Mouth/Throat BID Jacinda L Littlejohn, DO   15 mL at 03/29/22 0859    lubrifresh P.M. (artificial tears) ophthalmic ointment   Both Eyes Q6H Jacinda L Littlejohn, DO   Given at 03/29/22 0900    heparin (porcine) injection 5,000 Units  5,000 Units SubCUTAneous Q8H Jacinda L Littlejohn, DO   5,000 Units at 03/29/22 0531    sodium polystyrene (KAYEXALATE) 15 GM/60ML suspension 15 g  15 g Oral Once Jacinda L Littlejohn, DO        albuterol (PROVENTIL) nebulizer solution 2.5 mg  2.5 mg Nebulization Q6H PRN Clemente De Jesus MD        hydrocortisone sodium succinate PF (SOLU-CORTEF) injection 100 mg  100 mg IntraVENous Q8H Clemente De Jesus MD   100 mg at 03/29/22 0859    metronidazole (FLAGYL) 500 mg in NaCl 100 mL IVPB premix  500 mg IntraVENous Q8H Ishan Dee MD   Stopped at 03/29/22 0340    levothyroxine (SYNTHROID) tablet 88 mcg  88 mcg Oral Daily 200 Mundo Hendrix MD   88 mcg at 03/29/22 0531    famotidine (PEPCID) tablet 10 mg  10 mg Oral Daily Addis Chung MD   10 mg at 03/29/22 0859    epoetin nena-epbx (RETACRIT) injection 3,000 Units  3,000 Units SubCUTAneous Once per day on Mon Wed Fri Jeffrey Dose, APRN - CNP   3,000 Units at 03/28/22 1615    Vitamin D (CHOLECALCIFEROL) tablet 1,000 Units  1,000 Units Per NG tube Daily Jeffrey Dose, APRN - CNP   1,000 Units at 03/29/22 0859    [Held by provider] dextrose 5 % and 0.45 % sodium chloride infusion   IntraVENous Continuous Yossi Avery MD   Paused at 03/29/22 0248    labetalol (NORMODYNE;TRANDATE) injection 5 mg  5 mg IntraVENous Q6H PRN Yossi Avery MD           REVIEW OF SYSTEMS: could not be obtained         PHYSICAL EXAM:      Vitals:     BP (!) 138/100 Comment: Simultaneous filing. User may not have seen previous data. Pulse 112 Comment: Simultaneous filing. User may not have seen previous data. Temp 96.7 °F (35.9 °C)   Resp 14   Ht 5' 4\" (1.626 m)   Wt 149 lb 11.1 oz (67.9 kg)   SpO2 100%   BMI 25.69 kg/m²       General Appearance:    Intubated sedated, FiO2 40 % , PEEP 8    Head:    Normocephalic, atraumatic   Eyes:    +  pallor, no icterus,   Ears:    No obvious deformity or drainage.    Nose:   No nasal drainage   Throat:   Mucosa moist    Neck:   Supple, no lymphadenopathy   Lungs:     Crackles +   Heart:    Tachycardia    Abdomen:     Soft, non-tender, bowel sounds present    Extremities:   No edema,   Pulses:   Dorsalis pedis palpable    Skin:   no rashes , right chest Tesio catheter    CBC with Differential:      Lab Results   Component Value Date    WBC 12.3 03/29/2022    RBC 3.02 03/29/2022    HGB 8.1 03/29/2022    HCT 25.6 03/29/2022     03/29/2022    MCV 82.8 03/29/2022    MCH 26.2 03/29/2022    MCHC 31.6 03/29/2022    RDW 17.6 03/29/2022    NRBC 0.9 03/28/2022    SEGSPCT 67 06/27/2013    METASPCT 1.7 08/10/2020    LYMPHOPCT 0.9 03/29/2022    MONOPCT 0.6 03/29/2022    MYELOPCT 0.9 01/19/2022    BASOPCT 0.2 03/29/2022    MONOSABS 0.00 03/29/2022    LYMPHSABS 0.12 03/29/2022    EOSABS 0.00 03/29/2022    BASOSABS 0.00 03/29/2022       CMP     Lab Results   Component Value Date     03/29/2022    K 4.9 03/29/2022    K 3.7 03/04/2022    CL 93 03/29/2022    CO2 19 03/29/2022    BUN 22 03/29/2022    CREATININE 2.8 03/29/2022    GFRAA 24 03/29/2022    LABGLOM 24 03/29/2022    GLUCOSE 448 03/29/2022    GLUCOSE 130 05/18/2012    PROT 6.3 03/29/2022 LABALBU 2.2 03/29/2022    LABALBU 4.1 05/18/2012    CALCIUM 7.9 03/29/2022    BILITOT 0.3 03/29/2022    ALKPHOS 206 03/29/2022    AST 21 03/29/2022    ALT 21 03/29/2022         Hepatic Function Panel:    Lab Results   Component Value Date    ALKPHOS 206 03/29/2022    ALT 21 03/29/2022    AST 21 03/29/2022    PROT 6.3 03/29/2022    BILITOT 0.3 03/29/2022    BILIDIR <0.2 03/29/2022    IBILI see below 03/29/2022    LABALBU 2.2 03/29/2022    LABALBU 4.1 05/18/2012       PT/INR:    Lab Results   Component Value Date    PROTIME 12.8 03/27/2022    PROTIME 13.6 08/14/2011    INR 1.2 03/27/2022       TSH:    Lab Results   Component Value Date    TSH 5.370 03/28/2022       U/A:    Lab Results   Component Value Date    COLORU Yellow 03/28/2022    PHUR 8.5 03/28/2022    LABCAST RARE 01/12/2020    WBCUA >20 03/28/2022    WBCUA 0-1 05/18/2012    RBCUA 5-10 03/28/2022    RBCUA 1-3 08/01/2013    MUCUS Present 02/12/2016    TRICHOMONAS Present 07/17/2020    YEAST RARE 05/24/2018    BACTERIA MANY 03/28/2022    CLARITYU Clear 03/28/2022    SPECGRAV 1.015 03/28/2022    LEUKOCYTESUR LARGE 03/28/2022    UROBILINOGEN 0.2 03/28/2022    BILIRUBINUR Negative 03/28/2022    BILIRUBINUR SMALL 05/18/2012    BLOODU MODERATE 03/28/2022    GLUCOSEU 100 03/28/2022    GLUCOSEU >=1000 05/18/2012    AMORPHOUS RARE 11/01/2019       ABG:    Lab Results   Component Value Date    SSV4EFM 14.0 02/15/2021    L1KPLVUC 97.7 09/08/2012    PHART 7.345 07/20/2012    NAM5GHY 35.0 10/29/2019    PO2ART 91.5 10/29/2019       MICROBIOLOGY:    Blood culture - neg to date   Sputum -  Specimen: Sputum, Suctioned Updated: 03/29/22 0877      CULTURE, RESPIRATORY Oral Pharyngeal Celine present    Smear, Respiratory --    Few Polymorphonuclear leukocytes   Rare Epithelial cells   Few Gram positive coccobacillary organisms    Narrative:             Radiology :    Chest X ray : LLL infiltrates       IMPRESSION:     1.  Aspiration pneumonia, elevated procalcitonin, mild leukocytosis   2. ESRD on HD , DM recurrent hypoglycemia   3. Resp failure - intubated   4. Hx of recurrent C diff infection     RECOMMENDATIONS:      1. Zosyn 3..375 grams IV q  12 hrs   2. Flagyl  mg q 8 hrs ( hx of recurrent C diff infection )   3.  Supportive care

## 2022-03-29 NOTE — FLOWSHEET NOTE
03/29/22 1226   Vital Signs   BP (!) 145/95   Temp 96.8 °F (36 °C)   Pulse 105   Resp (!) 5   SpO2 100 %   Weight Method Estimated; Bed scale   Pain Assessment   Pain Assessment CPOT   Post-Hemodialysis Assessment   Post-Treatment Procedures Blood returned;Catheter capped, clamped and heparinized x 2 ports   Machine Disinfection Process Exterior Machine Disinfection   Dialyzer Clearance Moderately streaked   Duration of Treatment (minutes) 210 minutes   Heparin amount administered during treatment (units) 0 units   Hemodialysis Intake (ml) 600 ml   Hemodialysis Output (ml) 3600 ml   NET Removed (ml) 3000 ml   Tolerated Treatment Good   Patient Response to Treatment Pt tolerated tx well. Fluid balance -3000ml. Dressing changed. Heparin used to close HD CVC ports to fill volume. Report given to Mago Cartagena RN.  Pt stable   Bilateral Breath Sounds Clear;Diminished   Edema None

## 2022-03-29 NOTE — PROGRESS NOTES
ENDOCRINOLOGY PROGRESS NOTE  Via Tele-health service       Date of admission: 3/27/2022  Date of service: 3/29/2022  Admitting physician: Amado Avery DO   Primary Care Physician: Danielle Lester MD  Consultant physician: Enrique Floyd MD     Reason for the consultation:  DM type 1, labile diabetes     History of Present Illness: The history is provided from medical records, pt sedated intubated     Jared Bernal is a 36 y.o. old female nursing home resident with PMH of Type I DM, ESRD on PD,HTN,hypothyroidism, infective endocarditis and other listed below admitted to [facility] on 3/27/2022 because of AMS. Endocrine service was consulted for DM management.      subjective   BG spikes overnight as pt started on steroids     Inpatient diet:   Renal/Carb Restricted diet     Point of care glucose monitoring (Independently reviewed)   Recent Labs     03/28/22  1759 03/28/22  1913 03/28/22  2004 03/28/22  2154 03/29/22  0013 03/29/22  0242 03/29/22  0343 03/29/22  0535   GLUMET 254* 287* 278* 217* 353* 446* 362* 475*       Scheduled Meds:   dilTIAZem  90 mg Oral Q6H    insulin lispro  0-6 Units SubCUTAneous Q4H    magnesium sulfate  1,000 mg IntraVENous Once    insulin glargine  4 Units SubCUTAneous QAM    mirtazapine  15 mg Oral Nightly    escitalopram  10 mg Oral Daily    ARIPiprazole  5 mg Oral Nightly    rOPINIRole  0.25 mg Oral Nightly    sodium chloride flush  5-40 mL IntraVENous 2 times per day    piperacillin-tazobactam  3,375 mg IntraVENous Q12H    chlorhexidine  15 mL Mouth/Throat BID    artificial tears   Both Eyes Q6H    heparin (porcine)  5,000 Units SubCUTAneous Q8H    sodium polystyrene  15 g Oral Once    hydrocortisone sodium succinate PF  100 mg IntraVENous Q8H    metroNIDAZOLE  500 mg IntraVENous Q8H    levothyroxine  88 mcg Oral Daily    famotidine  10 mg Oral Daily    epoetin nena-epbx  3,000 Units SubCUTAneous Once per day on Mon Wed Fri    Vitamin D  1,000 Units Per NG tube Daily     PRN Meds:   sodium chloride flush, 5-40 mL, PRN  polyethylene glycol, 17 g, Daily PRN  acetaminophen, 650 mg, Q6H PRN   Or  acetaminophen, 650 mg, Q6H PRN  glucose, 15 g, PRN  dextrose, 12.5 g, PRN  glucagon (rDNA), 1 mg, PRN  dextrose, 100 mL/hr, PRN  albuterol, 2.5 mg, Q6H PRN  labetalol, 5 mg, Q6H PRN      Continuous Infusions:   dextrose      fentaNYL 5 mcg/ml in 0.9%  ml infusion 50 mcg/hr (03/29/22 0010)    [Held by provider] dextrose 5 % and 0.45 % NaCl Stopped (03/29/22 0248)       Review of Systems  Non-obtainable     OBJECTIVE    BP (!) 150/101   Pulse 98   Temp 96.7 °F (35.9 °C) (Axillary)   Resp 12   Ht 5' 4\" (1.626 m)   Wt 149 lb 11.2 oz (67.9 kg)   SpO2 100%   BMI 25.70 kg/m²     Intake/Output Summary (Last 24 hours) at 3/29/2022 3355  Last data filed at 3/29/2022 0410  Gross per 24 hour   Intake 2064.14 ml   Output 1325 ml   Net 739.14 ml       Physical examination:  Due to this being a TeleHealth encounter, evaluation of the following organ systems is limited: Vitals/Constitutional/EENT/Resp/CV/GI//MS/Neuro/Skin/Heme-Lymph-Imm. Modified physical exam through Telemedicine camera    General: intubated, sedated   Neck: no obvious neck mass. No obvious neck deformity     CVS: no distress   Chest: no distress.  Chest is moving with respiration    Extremities:  no visible tremor  Skin: No visible rashes as seen from camera   Musculoskeletal: no visible deformity  Neuro: sedated l      Review of Laboratory Data:  I personally reviewed the following labs:   Recent Labs     03/28/22  0228 03/28/22  0228 03/28/22  0530 03/28/22  1455 03/28/22  2146 03/29/22  0249 03/29/22  0500   WBC 8.0  --  6.0  --   --   --  12.3*   RBC 3.03*  --  2.80*  --   --   --  3.02*   HGB 7.9*   < > 7.5*   < > 7.9* 7.5* 7.9*   HCT 24.6*   < > 22.9*   < > 24.3* 23.8* 25.0*   MCV 81.2  --  81.8  --   --   --  82.8   MCH 26.1  --  26.8  --   --   --  26.2   MCHC 32.1  --  32.8  --   --   --  31.6*   RDW 17.3*  --  17.2*  --   --   --  17.6*     --  244  --   --   --  260   MPV 10.3  --  10.4  --   --   --  10.6    < > = values in this interval not displayed. Recent Labs     03/28/22 0228 03/28/22 0228 03/28/22  0400 03/28/22  0530 03/29/22  0418      < > 134 136 129*   K 6.0*   < > 4.7 4.7 4.9   CL 99   < > 101 103 93*   CO2 22   < > 22 22 19*   BUN 38*   < > 39* 38* 22*   CREATININE 4.7*   < > 4.8* 4.7* 2.8*   GLUCOSE 78   < > 133* 92 448*   CALCIUM 8.1*   < > 7.8* 7.9* 7.9*   PROT 6.6  --   --  6.2* 6.3*   LABALBU 2.3*  --   --  2.1* 2.2*   BILITOT 0.7  --   --  0.5 0.3   ALKPHOS 231*  --   --  210* 206*   AST 82*  --   --  58* 21   ALT 31  --   --  28 21    < > = values in this interval not displayed. Beta-Hydroxybutyrate   Date Value Ref Range Status   03/27/2022 0.10 0.02 - 0.27 mmol/L Final   03/02/2022 2.05 (H) 0.02 - 0.27 mmol/L Final   02/01/2022 0.09 0.02 - 0.27 mmol/L Final     Lab Results   Component Value Date    LABA1C 7.7 03/02/2022    LABA1C 8.7 12/08/2021    LABA1C 10.2 11/23/2021     Lab Results   Component Value Date/Time    TSH 5.370 (H) 03/28/2022 01:56 AM    T4FREE 1.10 03/28/2022 01:56 AM    V3SLZXG 8.6 11/05/2015 02:20 AM    FT3 1.3 (L) 10/31/2020 10:43 AM    Z4IJPMH 36.73 (L) 07/20/2020 02:00 PM     Lab Results   Component Value Date    LABA1C 7.7 03/02/2022    GLUCOSE 448 03/29/2022    GLUCOSE 130 05/18/2012    MALBCR 2949.3 01/15/2020    LABMICR 1740.1 01/15/2020    LABCREA 59 01/15/2020    LABCREA 61 01/15/2020     Lab Results   Component Value Date    TRIG 82 01/14/2020    HDL 33 01/14/2020    LDLCALC 46 01/14/2020    CHOL 95 01/14/2020       Blood culture   Lab Results   Component Value Date    BC 24 Hours no growth 03/27/2022    BC 5 Days no growth 01/01/2022       Radiology:  XR CHEST PORTABLE   Final Result   1. There is no acute cardiopulmonary disease   2. Stable position of the support lines and tubes.          XR CHEST PORTABLE   Final Result   Infiltrate and or atelectasis at the left lower lobe. Substantially resolved   infiltrate and or atelectasis on the right. New NG tube. CT HEAD WO CONTRAST   Final Result   No acute intracranial abnormality or hemorrhage. CT ABDOMEN PELVIS WO CONTRAST Additional Contrast? None   Final Result   1. Moderate ascites throughout abdomen and pelvis. 2.  Multiple thick walled loops of small bowel throughout the abdomen could   suggest nonspecific infectious or inflammatory enteritis. 3.  Generalized body wall edema. 4.  Small bilateral pleural effusions with adjacent atelectatic changes. XR ABDOMEN FOR NG/OG/NE TUBE PLACEMENT   Final Result   Enteric tube tip in the body of the stomach. XR CHEST PORTABLE   Final Result   Right perihilar opacity. XR CHEST PORTABLE    (Results Pending)   XR CHEST PORTABLE    (Results Pending)   XR CHEST PORTABLE    (Results Pending)       Medical Records/Labs/Images review:   I personally reviewed and summarized previous records   All labs and imaging were reviewed independently     [address] Maldonado, a 36 y.o.-old female seen today for inpatient diabetes management     Diabetes Mellitus type I    · The patient is extremely insulin sensitive. with ESRD she is even at much higher risk for hypoglycemia   · BG glucose spikes overnight after pt started on stress dose steroids.  Gap slightly opened and bicarb low   · For now, we recommend the following sq insulin regimen   · Lantus 4u daily   · Modified Low dose sliding scale (1u:100>200) Q6hrs   · Continue following BG and adjust insulin regimen    Evaluation for adrenal insufficiency   · With type 1 AM and primary hypothyroidism it is extremely important to r/o AI in this patient (Autoimmune Polyendocrine syndrome type 2, or Shmidth syndrome)   Cosyntropin test wasn't completed yesterday  Currently on stress dose steroids   Will reassess her HPA-axis after recovery of current critical illness     AMS   · Management per primary service      primary Hypothyroidism  · Admission labs showed low normal free T4 with slightly elevated TSH   · Levothyroxine was adjusted during this admission to 88 mcg daily      ESRD  · Pt at risk for hypoglycemia  · On dialysis, nephrology following    Thank you for allowing us to participate in the care of this patient. Please do not hesitate to contact us with any additional questions. Kris Wade MD  Endocrinologist, [facility] - [facility] Diabetes Care and Endocrinology   [address] [place] [id number]   Phone: [phone]  Fax: [phone]  ----------------------------  An electronic signature was used to authenticate this note.  Heather Childs MD on 3/29/2022 at 8:03 AM

## 2022-03-29 NOTE — PROGRESS NOTES
[facility]  Internal Medicine Residency Program  Progress Note - House Team 2    Patient:  Av Torres 36 y.o. female MRN: [id number]     Date of Service: 3/29/2022     CC: AMS   Overnight events: NAEO     Subjective      On day 2 of hospital admission    Patient was seen at bed side in the am.  Still sedated and intubated, receiving dialysis during time of examination. Patient endorses pain points to her abdomen when asked to localize. Patient is awake, answering questions, following commands appropriately. Plan for possible extubation later this afternoon. Objective     Physical Exam:  · Vitals: BP (!) 122/92   Pulse 109   Temp 96.7 °F (35.9 °C) (Axillary)   Resp 12   Ht 5' 4\" (1.626 m)   Wt 149 lb 11.2 oz (67.9 kg)   SpO2 100%   BMI 25.70 kg/m²       · Constitutional: Sedated, intubated, awake and able to answer questions/follow commands appropriately. Follows commands. In no apparent distress. · Head: Normocephalic and atraumatic. · Eyes: EOMI, conjunctiva normal, (-) scleral icterus. Mucus membranes moist.  · Mouth: Mucus membranes moist. Oropharynx clear. No deviation of the tongue or uvula. Normal dentition. · Neck: (-)  swelling, (-) JVD, trachea midline  · Respiratory: ETT in place, lungs clear to auscultation bilaterally. (-)  wheezes, (-)  rales, (-)  rhonchi. No increased work of breathing or respiratory distress  · Cardiovascular: RRR. (-)  murmurs, (-) gallops,  (-) rubs. S1 and S2 were normal.   · GI:  Abdomen soft, non-tender, non-distended. (+) BS. (-) guarding, (-) rigidity. · Extremities: Warm and well perfused. (-) clubbing, (-) cyanosis. 2+ distal pulses. (-) peripheral edema. (-)Sacral pitting edema. · Neurologic:  No focal neurological deficits.   No tremor or overt seizure activity    Pertinent Labs & Imaging Studies   jorge alberto  CBC with Differential:    Lab Results   Component Value Date    WBC 12.3 03/29/2022    RBC 3.02 03/29/2022    HGB 8.1 03/29/2022    HCT 25.6 03/29/2022     03/29/2022    MCV 82.8 03/29/2022    MCH 26.2 03/29/2022    MCHC 31.6 03/29/2022    RDW 17.6 03/29/2022    NRBC 0.9 03/28/2022    SEGSPCT 67 06/27/2013    METASPCT 1.7 08/10/2020    LYMPHOPCT 0.9 03/29/2022    MONOPCT 0.6 03/29/2022    MYELOPCT 0.9 01/19/2022    BASOPCT 0.2 03/29/2022    MONOSABS 0.00 03/29/2022    LYMPHSABS 0.12 03/29/2022    EOSABS 0.00 03/29/2022    BASOSABS 0.00 03/29/2022     CMP:    Lab Results   Component Value Date     03/29/2022    K 5.0 03/29/2022    K 3.7 03/04/2022    CL 95 03/29/2022    CO2 16 03/29/2022    BUN 25 03/29/2022    CREATININE 2.9 03/29/2022    GFRAA 23 03/29/2022    LABGLOM 23 03/29/2022    GLUCOSE 528 03/29/2022    GLUCOSE 130 05/18/2012    PROT 6.3 03/29/2022    LABALBU 2.2 03/29/2022    LABALBU 4.1 05/18/2012    CALCIUM 8.0 03/29/2022    BILITOT 0.3 03/29/2022    ALKPHOS 206 03/29/2022    AST 21 03/29/2022    ALT 21 03/29/2022     Magnesium:    Lab Results   Component Value Date    MG 1.8 03/29/2022     Phosphorus:    Lab Results   Component Value Date    PHOS 4.7 03/29/2022     Troponin:    Lab Results   Component Value Date    TROPONINI 0.12 05/14/2021     Resident's Assessment and Plan     Assessment     1. Acute metabolic encephalopathy-likely due to hypoglycemia in setting of DM1 and ESRD (CTH negative, negative UDS/SDS, NH3 normal, B12 normal 1 month ago)  2. HTN  3. Acute hypoxic respiratory failure  4. Aspiration pneumonia  5. ESRD on HD TTS  6. IDDM1-A1c 7.7% this admission  7. Hypothyroidism-TSH 5.37, FT4 1.10 normal  8. Acute on chronic anemia-s/p 1 unit PRBCs, chronic component likely due to ESRD  9. History of MDRO UTI  10. History of C. difficile infection    Plan   Continue sedation, intubation, mechanical ventilation-will wean to extubation when tolerated by patient   Patient hyperglycemic >500 this a.m. patient corrected to 263 s/p insulin administration.    Cosyntropin dilution test performed this a.m, stable interpretation as cortisol level approximately 144 on initial administration, reduced to 1.4 after 60 minutes.   We will communicate with lab regarding results   Continue hydrocortisone 40 mg twice daily, continue to taper   Continue Synthroid 88 mcg daily   Zosyn and Flagyl per ID   HD per nephro   Continue Abilify and Celexa   Appreciate Endo, nephro, infectious disease recommendations    PT/OT evaluation: Not indicated at this time  DVT prophylaxis/ GI prophylaxis: Heparin / Pepcid  Disposition: Continue MICU management     Dakota Razo DO, PGY-2  Attending physician: Dr. Adrian Segundo

## 2022-03-29 NOTE — PROGRESS NOTES
Attempted wean trial without success. Patient rr is 4, will continue to monitor.      03/29/22 1300   Vent Information   Vent Type 980   Vent Mode AC/VC   Vt Ordered 330 mL   Rate Set 12 bmp   Peak Flow 50 L/min   Pressure Support 0 cmH20   FiO2  40 %   SpO2 100 %   SpO2/FiO2 ratio 250   Sensitivity 3   PEEP/CPAP 8   I Time/ I Time % 0 s   Humidification Source Heated wire   Humidification Temp 37   Vent Patient Data   High Peep/I Pressure 0   Peak Inspiratory Pressure 26 cmH2O   Mean Airway Pressure 12 cmH20   Rate Measured 15 br/min   Vt Exhaled 340 mL   Minute Volume 5.22 Liters   I:E Ratio 1:5.90   Spontaneous Breathing Trial (SBT) RT Doc   Pulse 97   Additional Respiratory  Assessments   Resp 21   Alarm Settings   High Pressure Alarm 50 cmH2O

## 2022-03-30 ENCOUNTER — APPOINTMENT (OUTPATIENT)
Dept: GENERAL RADIOLOGY | Age: 32
DRG: 420 | End: 2022-03-30
Payer: COMMERCIAL

## 2022-03-30 LAB
AADO2: 111.7 MMHG
ALBUMIN SERPL-MCNC: 2.2 G/DL (ref 3.5–5.2)
ALP BLD-CCNC: 183 U/L (ref 35–104)
ALT SERPL-CCNC: 18 U/L (ref 0–32)
ANION GAP SERPL CALCULATED.3IONS-SCNC: 12 MMOL/L (ref 7–16)
ANION GAP SERPL CALCULATED.3IONS-SCNC: 9 MMOL/L (ref 7–16)
AST SERPL-CCNC: 17 U/L (ref 0–31)
B.E.: 1.4 MMOL/L (ref -3–3)
BASOPHILS ABSOLUTE: 0.01 E9/L (ref 0–0.2)
BASOPHILS RELATIVE PERCENT: 0.1 % (ref 0–2)
BILIRUB SERPL-MCNC: 0.3 MG/DL (ref 0–1.2)
BILIRUBIN DIRECT: <0.2 MG/DL (ref 0–0.3)
BILIRUBIN, INDIRECT: ABNORMAL MG/DL (ref 0–1)
BUN BLDV-MCNC: 15 MG/DL (ref 6–20)
BUN BLDV-MCNC: 21 MG/DL (ref 6–20)
CALCIUM IONIZED: 1.16 MMOL/L (ref 1.15–1.33)
CALCIUM SERPL-MCNC: 7.7 MG/DL (ref 8.6–10.2)
CALCIUM SERPL-MCNC: 7.8 MG/DL (ref 8.6–10.2)
CHLORIDE BLD-SCNC: 100 MMOL/L (ref 98–107)
CHLORIDE BLD-SCNC: 98 MMOL/L (ref 98–107)
CO2: 23 MMOL/L (ref 22–29)
CO2: 26 MMOL/L (ref 22–29)
COHB: 1.4 % (ref 0–1.5)
CREAT SERPL-MCNC: 1.9 MG/DL (ref 0.5–1)
CREAT SERPL-MCNC: 2.4 MG/DL (ref 0.5–1)
CRITICAL: ABNORMAL
CULTURE, RESPIRATORY: NORMAL
DATE ANALYZED: ABNORMAL
DATE OF COLLECTION: ABNORMAL
EOSINOPHILS ABSOLUTE: 0 E9/L (ref 0.05–0.5)
EOSINOPHILS RELATIVE PERCENT: 0 % (ref 0–6)
FIO2: 40 %
GFR AFRICAN AMERICAN: 29
GFR AFRICAN AMERICAN: 38
GFR NON-AFRICAN AMERICAN: 29 ML/MIN/1.73
GFR NON-AFRICAN AMERICAN: 38 ML/MIN/1.73
GLUCOSE BLD-MCNC: 148 MG/DL (ref 74–99)
GLUCOSE BLD-MCNC: 247 MG/DL (ref 74–99)
HCO3: 24.4 MMOL/L (ref 22–26)
HCT VFR BLD CALC: 22.9 % (ref 34–48)
HEMOGLOBIN: 7.6 G/DL (ref 11.5–15.5)
HHB: 1.2 % (ref 0–5)
IMMATURE GRANULOCYTES #: 0.07 E9/L
IMMATURE GRANULOCYTES %: 0.6 % (ref 0–5)
LAB: ABNORMAL
LYMPHOCYTES ABSOLUTE: 1.17 E9/L (ref 1.5–4)
LYMPHOCYTES RELATIVE PERCENT: 9.6 % (ref 20–42)
Lab: ABNORMAL
MAGNESIUM: 1.9 MG/DL (ref 1.6–2.6)
MCH RBC QN AUTO: 26.6 PG (ref 26–35)
MCHC RBC AUTO-ENTMCNC: 33.2 % (ref 32–34.5)
MCV RBC AUTO: 80.1 FL (ref 80–99.9)
METER GLUCOSE: 131 MG/DL (ref 74–99)
METER GLUCOSE: 139 MG/DL (ref 74–99)
METER GLUCOSE: 148 MG/DL (ref 74–99)
METER GLUCOSE: 148 MG/DL (ref 74–99)
METER GLUCOSE: 161 MG/DL (ref 74–99)
METER GLUCOSE: 180 MG/DL (ref 74–99)
METER GLUCOSE: 214 MG/DL (ref 74–99)
METER GLUCOSE: 216 MG/DL (ref 74–99)
METER GLUCOSE: 227 MG/DL (ref 74–99)
METER GLUCOSE: 228 MG/DL (ref 74–99)
METER GLUCOSE: 231 MG/DL (ref 74–99)
METER GLUCOSE: 251 MG/DL (ref 74–99)
METER GLUCOSE: 311 MG/DL (ref 74–99)
METHB: 0.1 % (ref 0–1.5)
MODE: AC
MONOCYTES ABSOLUTE: 0.17 E9/L (ref 0.1–0.95)
MONOCYTES RELATIVE PERCENT: 1.4 % (ref 2–12)
NEUTROPHILS ABSOLUTE: 10.77 E9/L (ref 1.8–7.3)
NEUTROPHILS RELATIVE PERCENT: 88.3 % (ref 43–80)
O2 CONTENT: 11.8 ML/DL
O2 SATURATION: 98.8 % (ref 92–98.5)
O2HB: 97.3 % (ref 94–97)
OPERATOR ID: ABNORMAL
PATIENT TEMP: 37 C
PCO2: 31.9 MMHG (ref 35–45)
PDW BLD-RTO: 17.6 FL (ref 11.5–15)
PEEP/CPAP: 8 CMH2O
PFO2: 3.17 MMHG/%
PH BLOOD GAS: 7.5 (ref 7.35–7.45)
PHOSPHORUS: 3.5 MG/DL (ref 2.5–4.5)
PLATELET # BLD: 263 E9/L (ref 130–450)
PMV BLD AUTO: 10.2 FL (ref 7–12)
PO2: 126.8 MMHG (ref 75–100)
POTASSIUM SERPL-SCNC: 4.6 MMOL/L (ref 3.5–5)
POTASSIUM SERPL-SCNC: 4.8 MMOL/L (ref 3.5–5)
PROCALCITONIN: 24.06 NG/ML (ref 0–0.08)
RBC # BLD: 2.86 E12/L (ref 3.5–5.5)
RI(T): 0.88
RR MECHANICAL: 12 B/MIN
SMEAR, RESPIRATORY: NORMAL
SODIUM BLD-SCNC: 133 MMOL/L (ref 132–146)
SODIUM BLD-SCNC: 135 MMOL/L (ref 132–146)
SOURCE, BLOOD GAS: ABNORMAL
THB: 8.4 G/DL (ref 11.5–16.5)
TIME ANALYZED: 509
TOTAL PROTEIN: 5.9 G/DL (ref 6.4–8.3)
URINE CULTURE, ROUTINE: NORMAL
VT MECHANICAL: 330 ML
WBC # BLD: 12.2 E9/L (ref 4.5–11.5)

## 2022-03-30 PROCEDURE — 82805 BLOOD GASES W/O2 SATURATION: CPT

## 2022-03-30 PROCEDURE — 6370000000 HC RX 637 (ALT 250 FOR IP): Performed by: INTERNAL MEDICINE

## 2022-03-30 PROCEDURE — 82330 ASSAY OF CALCIUM: CPT

## 2022-03-30 PROCEDURE — 36415 COLL VENOUS BLD VENIPUNCTURE: CPT

## 2022-03-30 PROCEDURE — 80076 HEPATIC FUNCTION PANEL: CPT

## 2022-03-30 PROCEDURE — 2580000003 HC RX 258: Performed by: INTERNAL MEDICINE

## 2022-03-30 PROCEDURE — 99232 SBSQ HOSP IP/OBS MODERATE 35: CPT | Performed by: INTERNAL MEDICINE

## 2022-03-30 PROCEDURE — 71045 X-RAY EXAM CHEST 1 VIEW: CPT

## 2022-03-30 PROCEDURE — 6360000002 HC RX W HCPCS: Performed by: INTERNAL MEDICINE

## 2022-03-30 PROCEDURE — 2500000003 HC RX 250 WO HCPCS: Performed by: INTERNAL MEDICINE

## 2022-03-30 PROCEDURE — 83735 ASSAY OF MAGNESIUM: CPT

## 2022-03-30 PROCEDURE — 82962 GLUCOSE BLOOD TEST: CPT

## 2022-03-30 PROCEDURE — 6370000000 HC RX 637 (ALT 250 FOR IP): Performed by: STUDENT IN AN ORGANIZED HEALTH CARE EDUCATION/TRAINING PROGRAM

## 2022-03-30 PROCEDURE — 80048 BASIC METABOLIC PNL TOTAL CA: CPT

## 2022-03-30 PROCEDURE — 85025 COMPLETE CBC W/AUTO DIFF WBC: CPT

## 2022-03-30 PROCEDURE — 6360000002 HC RX W HCPCS: Performed by: NURSE PRACTITIONER

## 2022-03-30 PROCEDURE — 6370000000 HC RX 637 (ALT 250 FOR IP): Performed by: NURSE PRACTITIONER

## 2022-03-30 PROCEDURE — 2700000000 HC OXYGEN THERAPY PER DAY

## 2022-03-30 PROCEDURE — 2060000000 HC ICU INTERMEDIATE R&B

## 2022-03-30 PROCEDURE — 84145 PROCALCITONIN (PCT): CPT

## 2022-03-30 PROCEDURE — 84100 ASSAY OF PHOSPHORUS: CPT

## 2022-03-30 PROCEDURE — 94003 VENT MGMT INPAT SUBQ DAY: CPT

## 2022-03-30 RX ORDER — INSULIN GLARGINE-YFGN 100 [IU]/ML
3 INJECTION, SOLUTION SUBCUTANEOUS EVERY MORNING
Status: DISCONTINUED | OUTPATIENT
Start: 2022-03-30 | End: 2022-03-31

## 2022-03-30 RX ORDER — MECOBALAMIN 5000 MCG
5 TABLET,DISINTEGRATING ORAL NIGHTLY
Status: DISCONTINUED | OUTPATIENT
Start: 2022-03-31 | End: 2022-04-05 | Stop reason: SDUPTHER

## 2022-03-30 RX ADMIN — DILTIAZEM HYDROCHLORIDE 90 MG: 30 TABLET, FILM COATED ORAL at 19:04

## 2022-03-30 RX ADMIN — MIRTAZAPINE 15 MG: 15 TABLET, FILM COATED ORAL at 21:06

## 2022-03-30 RX ADMIN — INSULIN LISPRO 1 UNITS: 100 INJECTION, SOLUTION INTRAVENOUS; SUBCUTANEOUS at 13:49

## 2022-03-30 RX ADMIN — EPOETIN ALFA-EPBX 3000 UNITS: 3000 INJECTION, SOLUTION INTRAVENOUS; SUBCUTANEOUS at 08:57

## 2022-03-30 RX ADMIN — HYDROCORTISONE SODIUM SUCCINATE 100 MG: 100 INJECTION, POWDER, FOR SOLUTION INTRAMUSCULAR; INTRAVENOUS at 00:51

## 2022-03-30 RX ADMIN — INSULIN LISPRO 1 UNITS: 100 INJECTION, SOLUTION INTRAVENOUS; SUBCUTANEOUS at 21:57

## 2022-03-30 RX ADMIN — INSULIN LISPRO 1 UNITS: 100 INJECTION, SOLUTION INTRAVENOUS; SUBCUTANEOUS at 17:54

## 2022-03-30 RX ADMIN — SODIUM CHLORIDE, PRESERVATIVE FREE 10 ML: 5 INJECTION INTRAVENOUS at 08:57

## 2022-03-30 RX ADMIN — HYDROCORTISONE SODIUM SUCCINATE 100 MG: 100 INJECTION, POWDER, FOR SOLUTION INTRAMUSCULAR; INTRAVENOUS at 08:56

## 2022-03-30 RX ADMIN — ARIPIPRAZOLE 5 MG: 5 TABLET ORAL at 21:06

## 2022-03-30 RX ADMIN — HYDROCORTISONE SODIUM SUCCINATE 100 MG: 100 INJECTION, POWDER, FOR SOLUTION INTRAMUSCULAR; INTRAVENOUS at 21:06

## 2022-03-30 RX ADMIN — FAMOTIDINE 10 MG: 20 TABLET ORAL at 08:57

## 2022-03-30 RX ADMIN — PIPERACILLIN AND TAZOBACTAM 3375 MG: 3; .375 INJECTION, POWDER, LYOPHILIZED, FOR SOLUTION INTRAVENOUS at 21:17

## 2022-03-30 RX ADMIN — Medication 1000 UNITS: at 08:57

## 2022-03-30 RX ADMIN — METRONIDAZOLE 500 MG: 500 INJECTION, SOLUTION INTRAVENOUS at 19:02

## 2022-03-30 RX ADMIN — DILTIAZEM HYDROCHLORIDE 90 MG: 30 TABLET, FILM COATED ORAL at 13:48

## 2022-03-30 RX ADMIN — MINERAL OIL AND WHITE PETROLATUM: 150; 830 OINTMENT OPHTHALMIC at 08:57

## 2022-03-30 RX ADMIN — 0.12% CHLORHEXIDINE GLUCONATE 15 ML: 1.2 RINSE ORAL at 08:56

## 2022-03-30 RX ADMIN — DILTIAZEM HYDROCHLORIDE 90 MG: 30 TABLET, FILM COATED ORAL at 00:51

## 2022-03-30 RX ADMIN — METRONIDAZOLE 500 MG: 500 INJECTION, SOLUTION INTRAVENOUS at 11:18

## 2022-03-30 RX ADMIN — PIPERACILLIN AND TAZOBACTAM 3375 MG: 3; .375 INJECTION, POWDER, LYOPHILIZED, FOR SOLUTION INTRAVENOUS at 09:01

## 2022-03-30 RX ADMIN — ACETAMINOPHEN 650 MG: 325 TABLET ORAL at 21:06

## 2022-03-30 RX ADMIN — Medication 5 MG: at 23:43

## 2022-03-30 RX ADMIN — LEVOTHYROXINE SODIUM 88 MCG: 0.09 TABLET ORAL at 06:04

## 2022-03-30 RX ADMIN — METRONIDAZOLE 500 MG: 500 INJECTION, SOLUTION INTRAVENOUS at 02:29

## 2022-03-30 RX ADMIN — MINERAL OIL AND WHITE PETROLATUM: 150; 830 OINTMENT OPHTHALMIC at 02:20

## 2022-03-30 RX ADMIN — SODIUM CHLORIDE, PRESERVATIVE FREE 10 ML: 5 INJECTION INTRAVENOUS at 21:06

## 2022-03-30 RX ADMIN — ESCITALOPRAM 10 MG: 10 TABLET, FILM COATED ORAL at 08:57

## 2022-03-30 RX ADMIN — ROPINIROLE 0.25 MG: 0.25 TABLET, FILM COATED ORAL at 21:06

## 2022-03-30 RX ADMIN — DILTIAZEM HYDROCHLORIDE 90 MG: 30 TABLET, FILM COATED ORAL at 06:27

## 2022-03-30 ASSESSMENT — PULMONARY FUNCTION TESTS
PIF_VALUE: 20
PIF_VALUE: 13
PIF_VALUE: 22
PIF_VALUE: 14
PIF_VALUE: 21
PIF_VALUE: 18
PIF_VALUE: 19
PIF_VALUE: 22
PIF_VALUE: 19
PIF_VALUE: 21
PIF_VALUE: 13
PIF_VALUE: 19
PIF_VALUE: 22
PIF_VALUE: 19

## 2022-03-30 ASSESSMENT — PAIN SCALES - GENERAL
PAINLEVEL_OUTOF10: 1
PAINLEVEL_OUTOF10: 0
PAINLEVEL_OUTOF10: 0
PAINLEVEL_OUTOF10: 5

## 2022-03-30 ASSESSMENT — PAIN DESCRIPTION - PAIN TYPE: TYPE: ACUTE PAIN

## 2022-03-30 ASSESSMENT — PAIN DESCRIPTION - LOCATION: LOCATION: HEAD

## 2022-03-30 ASSESSMENT — PAIN DESCRIPTION - FREQUENCY: FREQUENCY: CONTINUOUS

## 2022-03-30 ASSESSMENT — PAIN DESCRIPTION - ORIENTATION: ORIENTATION: MID

## 2022-03-30 ASSESSMENT — PAIN DESCRIPTION - ONSET: ONSET: ON-GOING

## 2022-03-30 ASSESSMENT — PAIN DESCRIPTION - DESCRIPTORS: DESCRIPTORS: HEADACHE

## 2022-03-30 ASSESSMENT — PAIN DESCRIPTION - PROGRESSION: CLINICAL_PROGRESSION: NOT CHANGED

## 2022-03-30 NOTE — PROGRESS NOTES
[facility]  Internal Medicine Clinic    Attending Physician Statement:  Antonietta Goodpasture. D.O. I have discussed the case, including pertinent history and exam findings with the resident. I agree with the assessment, plan and orders as documented by the resident. Patient here for routine follow up of medical problems. Acute metabolic encephalopathy: likely multifactorial at this point (hypoglycemia and infectious); improved; patient alert and responding to commands; patient extubated this AM     Hypoxic Respiratory Failure: 2/2 encephalopathy and PNA; antibiotics per ID; patient extubated to 5 L NC; input appreciated     Hypoglycemia: blood work ordered; AI evaluation ordered per endocrinology to be performed as outpatient 2/2 hypocortisolism on presentation and empiric treatment with corticosteroids    End-stage renal disease on hemodialysis: Nephrology consulted and for dialysis on a TTS schedule, care per their recommendations, patient received hemodialysis this a.m. Aspiration pneumonia: Infectious disease consulted and currently receiving Zosyn    History of recurrent C. difficile infections: Patient currently receiving Flagyl per infectious disease recommendations    Remainder of medical problems as per resident note.

## 2022-03-30 NOTE — PROGRESS NOTES
[address] [facility]   Department of Internal Medicine   Internal Medicine Residency  MICU Progress Note    Patient:  Efrain Barnett 36 y.o. female   MRN: [id number]       Date of Service: 3/30/2022    Allergy: Cefepime and Toradol [ketorolac tromethamine]    Subjective:     Critical Care Daily Progress Note: 3/30/2022 1:21 PM    Patient was seen and examined this morning at beside  Extubated to 5 L NC after weaning parameter   lethargic but arousable    vitals stable   Labile blood sugar  Discussed with Dr. Miriam Castellanos and Dr. Gabrielle Freris     24 hour change: Blood sugar went down to 95, started on D5 half normal for 3 hours     Objective     Vitals reviewed. I/O last 3 completed shifts: In: 3584.6 [I.V.:1673.9; NG/GT:240; IV Piggyback:1070.7]  Out: 3615 [Urine:15]  I/O this shift:  In: -   Out: 425 [Urine:425]    TEMPERATURE:  Current - Temp: 96.8 °F (36 °C); Max - Temp  Av.9 °F (36.1 °C)  Min: 93.2 °F (34 °C)  Max: 98.2 °F (36.8 °C)  RESPIRATIONS RANGE: Resp  Av.7  Min: 9  Max: 24  PULSE RANGE: Pulse  Av.1  Min: 73  Max: 92  BLOOD PRESSURE RANGE:  Systolic (59CMM), LDS:559 , Min:116 , TIA:522   ; Diastolic (82KWE), EOL:12, Min:82, Max:118    PULSE OXIMETRY RANGE: SpO2  Av %  Min: 100 %  Max: 100 %    I & O - 24hr:    Intake/Output Summary (Last 24 hours) at 3/30/2022 1321  Last data filed at 3/30/2022 1100  Gross per 24 hour   Intake 1476.44 ml   Output 425 ml   Net 1051.44 ml     I/O last 3 completed shifts: In: 3584.6 [I.V.:1673.9; NG/GT:240; IV Piggyback:1070.7]  Out: 7073 [Urine:15] I/O this shift:  In: -   Out: 425 [Urine:425]   Weight change: -0.1 oz (-0.003 kg)    Physical Exam:  General Appearance:   follow simple command waking up   Extubated to 5 NC   HEENT:    NC/AT, mucous membranes are moist.   Neck:   Supple, no jugular venous distention. Resp:     CTAB, No wheezes, No rhonchi, no use of accessory muscles   Heart:    RRR, S1 and S2 normal, no murmur, rub or gallop. BUN 9 11 15   CREATININE 1.3* 1.6* 1.9*   GLUCOSE 247* 126* 148*       LIVER PROFILE:   Recent Labs     03/28/22  0530 03/29/22  0418 03/30/22  0505   AST 58* 21 17   ALT 28 21 18   BILIDIR 0.2 <0.2 <0.2   BILITOT 0.5 0.3 0.3   ALKPHOS 210* 206* 183*       PT/INR:   Recent Labs     03/27/22  1832   PROTIME 12.8*   INR 1.2       APTT:   Recent Labs     03/27/22  1832   APTT 34.1       Fasting Lipid Panel:    Lab Results   Component Value Date    CHOL 95 01/14/2020    TRIG 82 01/14/2020    HDL 33 01/14/2020       Cardiac Enzymes:    Lab Results   Component Value Date    CKTOTAL 22 11/22/2021    CKTOTAL 120 02/15/2021    CKTOTAL 99 11/04/2020    CKMB 2.1 10/31/2020    CKMB <1.0 03/24/2015    CKMB <1.0 03/23/2015    TROPONINI 0.12 (H) 05/14/2021    TROPONINI 0.22 (H) 02/15/2021    TROPONINI 0.13 (H) 12/26/2020       Notable Cultures:      Blood cultures   Blood Culture, Routine   Date Value Ref Range Status   03/28/2022 24 Hours no growth  Preliminary     Respiratory cultures No results found for: RESPCULTURE   Gram Stain Result   Date Value Ref Range Status   12/08/2021 Refer to ordered Gram stain for results  Final     Urine   Urine Culture, Routine   Date Value Ref Range Status   03/27/2022 Growth not present  Final     Legionella No results found for: LABLEGI  C Diff PCR No results found for: CDIFPCR  Wound culture/abscess: No results for input(s): WNDABS in the last 72 hours. Tip culture:No results for input(s): CXCATHTIP in the last 72 hours.      Antibiotic  Days  Day started                                Oxygen:     Vent Information  $Ventilation: $Subsequent Day  Skin Assessment: Clean, dry, & intact  Equipment ID: [id number]  Vent Type: 980  Vent Mode: (S) PS  Vt Ordered: 0 mL  Rate Set: 0 bmp  Peak Flow: 0 L/min  Pressure Support: 8 cmH20  FiO2 : 0 %  SpO2: 100 %  SpO2/FiO2 ratio: 250  Sensitivity: 3  PEEP/CPAP: 5  I Time/ I Time %: 0 s  Humidification Source: Heated wire  Humidification Temp: 37  Humidification Temp Measured: 37  Circuit Condensation: Drained  Additional Respiratory  Assessments  Pulse: 77  Resp: 13  SpO2: 100 %  End Tidal CO2: 29 (%)  Humidification Source: Heated wire  Humidification Temp: 37  Circuit Condensation: Drained  Oral Care: Mouth swabbed,Mouth moisturizer,Mouth suctioned  Subglottic Suction Done?: Yes  Airway Type: ET  Airway Size: 7.5  Cuff Pressure (cm H2O): 29 cm H2O     Nasal cannula L/min     Face mask %     Reservoirs mask %       ABG   Vent Settings     PH   Mode      PCO2   TV      PO2   RR      HCO3   PS      Sat%   PEEP      FIO2   FIO2        P/F          Lines:  Site  Day  Date inserted     TLC              PICC              Arterial line              Peripheral line              HD cath            [REMOVED] Urethral Catheter Straight-tip 16 fr-Output (mL): 5 mL  External Urinary Catheter-Output (mL): 5 mL      Brief Summary: The patient is a 36 y.o. female with PMH of uncontrolled T1DM (Hgb A1c 7.7%), ESRD on HD, hypothyroidism, prior endocarditis w/ septic emboli, anemia of chronic disease, prior MDRO UTI, and MRSA infection, who was brought in to the ED for acute encephalopathy. She was found to be lethargic and encephalopathic by employees of her SNF. POCT BG was collected, which read as too low to quantify on the meter. EMS was called, and the pt was transported to [facility] ED. On arrival to the ED, the pt was obtunded and unable to answer questions/follow commands. D/t concern for respiratory compromise and inability to protect her airway, she was urgently intubated for airway protection. Others work up in the ED, Hb 6.5, BNP 70K, troponin 206, CT shows small bowel wall thickening. Started on Vanc and zoysen and started in 100 cc/h. For further care and management she was transferred to MICU    Assessment:     Active problems   1. Acute Metabolic Encephalopathy 2/2 hypoglycemia vs stroke vs seizure, resolving. She is waking up   2.  Acute respiratory failure secondary to altered mental status. Concern for Aspiration pneumonia versus pneumonitis, on Zosyn. S/P intubation  3. Brittle DMT1, labile blood sugar   4. DKA, transient. Gap is closed on repeat BMP  5. Extremely sensitive to insulin  6. Elevated Troponin 2/2 end-stage renal disease  7. Elevated proBNP secondary to ESRD disease  8. Prolong QTc   9. Hx Chronic c. Diff, on metronidazole per ID  10. Hx CKD 2/2 to DM. on dialysis TTS  11. Acute on chronic Anemia of Chronic Disease, s/p 1 unit of blood. Stable     Inactive problems:  12. Hx Septic Emboli  13. Hx of anxiety and depression   14. Hx Vitamin D deficiency  15. Hx of hypertension  16. Hx of COVID-19 pneumonia  17. Hx of MDRO UTI  18. Hx of Gastroparesis   19. Hx of hypothyroidism    20. Hx of restless leg syndrome     Plan:   · successful extubation, tolerating NC well. Monitor respiratory status   · PT, OT   · Passed bedside swallow, resume diet    · Daily CXR and ABG   · Monitor vitals and blood sugar Q 1H  · If BG running low, start on D5 half normal saline   · Careful with insulin, unpredictable response to insulin   · Holding the Lantus, continue with modified SS  · Continue hydrocortisone   · Follow Procal in the AM tomorrow   · Greatly appreciate Endo, ID and Nephro recommendations   · Take the central line out, follow tip culture     # Code Status: Full   # Peptic ulcer prophylaxis: Pepcid   # DVT Prophylaxis: PCD  # Disposition: Cont current care, for transfer tomorrow     Chevy Fish MD, PGY-2  Internal medicine resident  Attending Physician: Dr. Piero Rosales     I personally saw, examined and provided care for the patient. Radiographs, labs and medication list were reviewed by me independently. I spoke with bedside nursing, therapists and consultants. Critical care services and times documented are independent of procedures and multidisciplinary rounds with Residents.  Additionally comprehensive, multidisciplinary rounds were conducted with the MICU team. The case was discussed in detail and plans for care were established. Review of Residents documentation was conducted and revisions were made as appropriate. I agree with the above documented exam, problem list and plan of care.   Cale Heredia MD   CCT excluding procedures:35'

## 2022-03-30 NOTE — CARE COORDINATION
3/30: Update CM Note:  Pt was scheduled for an outpatient EGD with pyloric dilation & botex injection for diabetic gastroparesis her BS was 588 from [facility]. Pt was intubated & transferred to MICU. Pt is in ISO/Contact for C-Diff. Pt was extubated on 3/30 to 5L/NC at 100%. Pt is on a sliding scale of insulin, Pt is Iv Iv Solu-cortef, Iv Flagyl & Iv Zosyn. CM spoke with her mother Tita Jones at [phone] on 3/28 . She is uncertain if her daughter would be open to returning to [facility]. She feels she will want to return home. Pt's PCP is  & uses [address] [place] [address] in [place]. She normally lives with her mother & two sisters in a house with 4 steps to enter. The bed/bathroom are on the 1st floor. She was using a wheelchair & walker at the facility. She was also using 02 PRN. [facility] is on vacation she will be back tomorrow. Pt will need a pre-cert & PT/OT no covid. [facility] has no preferences for DME/HHC & declined a list. Needs are unclear until pt medically stable. Will need PT/OT/Speech evals. Vickey/MICHEL will continue to follow for dc planning.  Electronically signed by Alyssa Chou RN on 3/30/2022 at 3:37 PM

## 2022-03-30 NOTE — PROGRESS NOTES
Spontaneous Parameters performed    VT = 486 ml  f = 13  B/M  Ve = 5.93 L/M  NIF = -38  cmH2O  VC = 1.34 L  RSBI = 27  Audible leak heard when cuff was deflated.      Performed by Rudolph Beck RCP

## 2022-03-30 NOTE — PROGRESS NOTES
Department of Internal Medicine  Nephrology Progress Note        Events reviewed. For possible extubation today. SUBJECTIVE:  We are following Miss Krishna Caraballo for ESKD on HD. She remains intubated but is alert and able to follow commands.     PHYSICAL EXAM:      Vitals:    VITALS:  BP (!) 141/96   Pulse 88   Temp 96.8 °F (36 °C) (Axillary)   Resp 9   Ht 5' 4\" (1.626 m)   Wt 141 lb (64 kg)   SpO2 100%   BMI 24.20 kg/m²   24HR INTAKE/OUTPUT:      Intake/Output Summary (Last 24 hours) at 3/30/2022 1136  Last data filed at 3/30/2022 0700  Gross per 24 hour   Intake 2076.44 ml   Output 3600 ml   Net -1523.56 ml       Access: RIJ tunneled dialysis catheter  Constitutional: Sedated on ventilator  HEENT:  PERRL, normocephalic, atraumatic  Respiratory:  CTA bilateral, on mechanical ventilator  Cardiovascular/Edema:  ST, RRR, no murmur gallop or rub  Gastrointestinal:  abd distended, c/o persistent abdominal pain  Neurologic: Sedated  Skin:  Warm, dry, ecchymotic areas to abdomen    Other:    Scheduled Meds:   insulin glargine-yfgn  3 Units SubCUTAneous QAM    dilTIAZem  90 mg Oral Q6H    insulin lispro  0-3 Units SubCUTAneous Q6H    mirtazapine  15 mg Oral Nightly    escitalopram  10 mg Oral Daily    ARIPiprazole  5 mg Oral Nightly    rOPINIRole  0.25 mg Oral Nightly    sodium chloride flush  5-40 mL IntraVENous 2 times per day    piperacillin-tazobactam  3,375 mg IntraVENous Q12H    chlorhexidine  15 mL Mouth/Throat BID    artificial tears   Both Eyes Q6H    sodium polystyrene  15 g Oral Once    hydrocortisone sodium succinate PF  100 mg IntraVENous Q8H    metroNIDAZOLE  500 mg IntraVENous Q8H    levothyroxine  88 mcg Oral Daily    famotidine  10 mg Oral Daily    epoetin nena-epbx  3,000 Units SubCUTAneous Once per day on Mon Wed Fri    Vitamin D  1,000 Units Per NG tube Daily     Continuous Infusions:   dextrose      fentaNYL 5 mcg/ml in 0.9%  ml infusion Stopped (03/30/22 1000)     PRN Meds:.white petrolatum, sodium chloride flush, polyethylene glycol, acetaminophen **OR** acetaminophen, glucose, dextrose, glucagon (rDNA), dextrose, albuterol, labetalol    DATA:    CBC:   Lab Results   Component Value Date    WBC 12.2 03/30/2022    RBC 2.86 03/30/2022    HGB 7.6 03/30/2022    HCT 22.9 03/30/2022    MCV 80.1 03/30/2022    MCH 26.6 03/30/2022    MCHC 33.2 03/30/2022    RDW 17.6 03/30/2022     03/30/2022    MPV 10.2 03/30/2022     CMP:    Lab Results   Component Value Date     03/30/2022    K 4.8 03/30/2022    K 3.7 03/04/2022    CL 98 03/30/2022    CO2 26 03/30/2022    BUN 15 03/30/2022    CREATININE 1.9 03/30/2022    GFRAA 38 03/30/2022    LABGLOM 38 03/30/2022    GLUCOSE 148 03/30/2022    GLUCOSE 130 05/18/2012    PROT 5.9 03/30/2022    LABALBU 2.2 03/30/2022    LABALBU 4.1 05/18/2012    CALCIUM 7.7 03/30/2022    BILITOT 0.3 03/30/2022    ALKPHOS 183 03/30/2022    AST 17 03/30/2022    ALT 18 03/30/2022     Magnesium:    Lab Results   Component Value Date    MG 1.9 03/30/2022     Phosphorus:    Lab Results   Component Value Date    PHOS 3.5 03/30/2022     Radiology Review:      CT Head WO Contrast March 27, 2022   No acute intracranial abnormality or hemorrhage.         CT Abdomen Pelvis WO Contrast March 27, 2022   1.  Moderate ascites throughout abdomen and pelvis.       2.  Multiple thick walled loops of small bowel throughout the abdomen could   suggest nonspecific infectious or inflammatory enteritis.       3.  Generalized body wall edema.       4.  Small bilateral pleural effusions with adjacent atelectatic changes.             Chest X-Ray March 28, 2022   Infiltrate and or atelectasis at the left lower lobe.  Substantially resolved   infiltrate and or atelectasis on the right.  New NG tube.               BRIEF SUMMARY OF INITIAL CONSULT:    Briefly, Miss Krishna Caraballo is a 36year-old female with a PMH of ESRD on hemodialysis 3 days/wk, on TTS schedule at [facility] I DM, poorly controlled with recurrent admissions for DKA, HTN, gastroparesis, ascites, infective endocarditis, cardiac arrest, hypothyroidism and depression who was admitted on March 27, 2022 after she was found unresponsive at the SNF where she resides. Patient was found to be profoundly hypoglycemic and EMS was called. She was intubated in ER for airway protection as she remained unresponsive. CT head showed no acute intracranial abnormality or hemorrhage, chest x-ray demonstrates right perihilar opacity concerning for aspiration pneumonia. She was ultimately admitted to MICU for further evaluation. Labs on admission were significant for sodium 135, potassium 5.0, chloride 100, bicarbonate 25, BUN 38, creatinine 4.9, proBNP >70,000. We are consulted for dialysis management. Problems resolved. · Hypoglycemia, was on D10, now hyperglycemic with +ketones (beta-hydroxybutyrate >4.5)    IMPRESSION/RECOMMENDATIONS:      1. ESRD 3 times a week TTS via RIJ tunneled dialysis catheter. HD initiated September 2021 after failed peritoneal dialysis with persistent hyperkalemia. For dialysis three times/wk TTS while inpatient. 2. Acidemia, pH 7.311, PCO2 35.0, HCO3 14.4, metabolic acidosis from DKA  3. Type I DM, poorly controlled with frequent readmissions for DKA, was on        D10 for hypoglycemia, now hyperglycemic   4. DKA, gap has closed. On SSI  5. Acute metabolic encephalopathy 2/2 hypoglycemia. Resolving   6. MBD of CKD, on sevelamer at home. Hold while NPO  7. Anemia of CKD, Hgb 6.5 mg/dL on admission, continue epoetin alpha 3,000 unit 3 times/wk. S/p transfusion  8. HTN, on lopressor and amlodipine at home  9. Vitamin D deficiency, on ergocalciferol at home  ------------------------------------------------------  10. Aspiration pneumonia? On piperacillin    11. Acute respiratory failure, 2/2 aspiration pneumonia? On 40% Fio2  12. Recent Covid 19 infection, unvaccinated  15.  Restless leg syndrome, on ropinirole at home  14. Depression, on escitalopram and Abilify  15. Hypothyroidism, on levothyroxine   16. History of gastroparesis, on metoclopramide. For EGD and pyloric dilatation with Botox injection outpatient per GI.  17. Hx recurrent C. difficile infection, on flagyl  18. Hx of MDRO UTI  19.  Nutrition, NPO     Plan:     · Continue HD today and three times/wk, TTS while inpatient,for dialysis tomorrow  · Continue epoetin alpha 3,000 units 3 times/wk  · Monitor labs daily   · Monitor glucose levels  · Monitor neurological status, alert and able to follow commands   · For possible extubation today  · Monitor H&H, transfuse <7    Electronically signed by HEBER Velasquez CNP on 3/30/2022 at 11:36 AM

## 2022-03-30 NOTE — PROGRESS NOTES
IV solution  12.5 g IntraVENous PRN Jacinda L Littlejohn, DO   12.5 g at 03/28/22 0358    glucagon (rDNA) injection 1 mg  1 mg IntraMUSCular PRN Jacinda L Littlejohn, DO        dextrose 5 % solution  100 mL/hr IntraVENous PRN Jacinda L Littlejohn, DO        fentaNYL 5 mcg/ml in 0.9%  ml infusion   mcg/hr IntraVENous Continuous Jacinda L Littlejohn, DO   Stopped at 03/30/22 1000    chlorhexidine (PERIDEX) 0.12 % solution 15 mL  15 mL Mouth/Throat BID Jacinda L Littlejohn, DO   15 mL at 03/30/22 0856    lubrifresh P.M. (artificial tears) ophthalmic ointment   Both Eyes Q6H Jacinda L Littlejohn, DO   Given at 03/30/22 0857    sodium polystyrene (KAYEXALATE) 15 GM/60ML suspension 15 g  15 g Oral Once Jacinda L Littlejohn, DO        albuterol (PROVENTIL) nebulizer solution 2.5 mg  2.5 mg Nebulization Q6H PRN Aramis Jluio MD        hydrocortisone sodium succinate PF (SOLU-CORTEF) injection 100 mg  100 mg IntraVENous Q8H Aramis Julio MD   100 mg at 03/30/22 0856    metronidazole (FLAGYL) 500 mg in NaCl 100 mL IVPB premix  500 mg IntraVENous Q8H Ishan Dee MD   Stopped at 03/30/22 1220    levothyroxine (SYNTHROID) tablet 88 mcg  88 mcg Oral Daily Lizbeth Lemus MD   88 mcg at 03/30/22 0604    famotidine (PEPCID) tablet 10 mg  10 mg Oral Daily Lillian Katz MD   10 mg at 03/30/22 0857    epoetin nena-epbx (RETACRIT) injection 3,000 Units  3,000 Units SubCUTAneous Once per day on Mon Wed Fri HEBER Velasquez CNP   3,000 Units at 03/30/22 3538    Vitamin D (CHOLECALCIFEROL) tablet 1,000 Units  1,000 Units Per NG tube Daily HEBER Velasquez - CNP   1,000 Units at 03/30/22 0857    labetalol (NORMODYNE;TRANDATE) injection 5 mg  5 mg IntraVENous Q6H PRN Army Arcadio MD           REVIEW OF SYSTEMS:      CONSTITUTIONAL: Denies fever or chills   HEENT: Denies sore throat   RESPIRATORY: SOB   CARDIOVASCULAR:  Denies palpitation  GASTROINTESTINAL: Denies abdomen pain , no diarrhea GENITOURINARY:  ESRD on HD   INTEGUMENT: No rash    HEMATOLOGIC/LYMPHATIC:  No bleeding . MUSCULOSKELETAL:  Denies leg swelling    NEUROLOGICAL:  denies loss of consciousness or weakness        PHYSICAL EXAM:      Vitals:     BP (!) 149/95   Pulse 83   Temp 96.8 °F (36 °C) (Axillary)   Resp 9   Ht 5' 4\" (1.626 m)   Wt 141 lb (64 kg)   SpO2 100%   BMI 24.20 kg/m²       General Appearance:    Extubated, awake , alert, no distress    Head:    Normocephalic, atraumatic   Eyes:    +  pallor, no icterus,   Ears:    No obvious deformity or drainage.    Nose:   No nasal drainage   Throat:   Mucosa moist    Neck:   Supple, no lymphadenopathy   Lungs:     Crackles +   Heart:    Tachycardia    Abdomen:     Soft, non-tender, bowel sounds present    Extremities:   No edema,   Pulses:   Dorsalis pedis palpable    Skin:   no rashes , right chest Tesio catheter    CBC with Differential:      Lab Results   Component Value Date    WBC 12.2 03/30/2022    RBC 2.86 03/30/2022    HGB 7.6 03/30/2022    HCT 22.9 03/30/2022     03/30/2022    MCV 80.1 03/30/2022    MCH 26.6 03/30/2022    MCHC 33.2 03/30/2022    RDW 17.6 03/30/2022    NRBC 0.9 03/28/2022    SEGSPCT 67 06/27/2013    METASPCT 1.7 08/10/2020    LYMPHOPCT 9.6 03/30/2022    MONOPCT 1.4 03/30/2022    MYELOPCT 0.9 01/19/2022    BASOPCT 0.1 03/30/2022    MONOSABS 0.17 03/30/2022    LYMPHSABS 1.17 03/30/2022    EOSABS 0.00 03/30/2022    BASOSABS 0.01 03/30/2022       CMP     Lab Results   Component Value Date     03/30/2022    K 4.8 03/30/2022    K 3.7 03/04/2022    CL 98 03/30/2022    CO2 26 03/30/2022    BUN 15 03/30/2022    CREATININE 1.9 03/30/2022    GFRAA 38 03/30/2022    LABGLOM 38 03/30/2022    GLUCOSE 148 03/30/2022    GLUCOSE 130 05/18/2012    PROT 5.9 03/30/2022    LABALBU 2.2 03/30/2022    LABALBU 4.1 05/18/2012    CALCIUM 7.7 03/30/2022    BILITOT 0.3 03/30/2022    ALKPHOS 183 03/30/2022    AST 17 03/30/2022    ALT 18 03/30/2022         Hepatic Function Panel:    Lab Results   Component Value Date    ALKPHOS 183 03/30/2022    ALT 18 03/30/2022    AST 17 03/30/2022    PROT 5.9 03/30/2022    BILITOT 0.3 03/30/2022    BILIDIR <0.2 03/30/2022    IBILI see below 03/30/2022    LABALBU 2.2 03/30/2022    LABALBU 4.1 05/18/2012       PT/INR:    Lab Results   Component Value Date    PROTIME 12.8 03/27/2022    PROTIME 13.6 08/14/2011    INR 1.2 03/27/2022       TSH:    Lab Results   Component Value Date    TSH 5.370 03/28/2022       U/A:    Lab Results   Component Value Date    COLORU Yellow 03/28/2022    PHUR 8.5 03/28/2022    LABCAST RARE 01/12/2020    WBCUA >20 03/28/2022    WBCUA 0-1 05/18/2012    RBCUA 5-10 03/28/2022    RBCUA 1-3 08/01/2013    MUCUS Present 02/12/2016    TRICHOMONAS Present 07/17/2020    YEAST RARE 05/24/2018    BACTERIA MANY 03/28/2022    CLARITYU Clear 03/28/2022    SPECGRAV 1.015 03/28/2022    LEUKOCYTESUR LARGE 03/28/2022    UROBILINOGEN 0.2 03/28/2022    BILIRUBINUR Negative 03/28/2022    BILIRUBINUR SMALL 05/18/2012    BLOODU MODERATE 03/28/2022    GLUCOSEU 100 03/28/2022    GLUCOSEU >=1000 05/18/2012    AMORPHOUS RARE 11/01/2019       ABG:    Lab Results   Component Value Date    KOK0PNP 14.0 02/15/2021    S4QYEWJE 97.7 09/08/2012    PHART 7.345 07/20/2012    ZAZ8EPF 35.0 10/29/2019    PO2ART 91.5 10/29/2019       MICROBIOLOGY:    Blood culture - neg to date   Sputum -  Specimen: Sputum, Suctioned Updated: 03/29/22 0826      CULTURE, RESPIRATORY Oral Pharyngeal Celine present    Smear, Respiratory --    Few Polymorphonuclear leukocytes   Rare Epithelial cells   Few Gram positive coccobacillary organisms    Narrative:             Radiology :    Chest X ray : LLL infiltrates       IMPRESSION:     1. Aspiration pneumonia, elevated procalcitonin, leukocytosis   2. ESRD on HD , DM recurrent hypoglycemia   3. Resp failure - intubated   4. Hx of recurrent C diff infection     RECOMMENDATIONS:      1. Zosyn 3..375 grams IV q  12 hrs   2.  Flagyl

## 2022-03-30 NOTE — PLAN OF CARE
Problem: Skin Integrity:  Goal: Will show no infection signs and symptoms  Description: Will show no infection signs and symptoms  Outcome: Met This Shift     Problem: Skin Integrity:  Goal: Absence of new skin breakdown  Description: Absence of new skin breakdown  Outcome: Met This Shift     Problem: Serum Glucose Level - Abnormal:  Goal: Ability to maintain appropriate glucose levels has stabilized  Description: Ability to maintain appropriate glucose levels has stabilized  Outcome: Ongoing

## 2022-03-30 NOTE — PROGRESS NOTES
[facility]  Internal Medicine Residency Program  Progress Note - House Team 2    Patient:  Karen Giron 36 y.o. female MRN: [id number]     Date of Service: 3/30/2022     CC: AMS   Overnight events: NAEO     Subjective      On day 3 of hospital admission    Patient was seen at bed side in the am.  Still sedated and intubated, fentanyl 50mcg running at time of examination. Patient awake, alert, able to answer questions appropriately despite sedation. C/O abdominal pain. Appears in NAD   Discussed care plan with MICU resident team-patient will be trialed on PSV this afternoon. If she tolerates, will attempt extubation    Objective     Physical Exam:  · Vitals: BP (!) 152/106   Pulse 81   Temp 97.2 °F (36.2 °C) (Axillary)   Resp 12   Ht 5' 4\" (1.626 m)   Wt 141 lb (64 kg)   SpO2 100%   BMI 24.20 kg/m²       · Constitutional: Sedated, intubated, awake and able to answer questions/follow commands appropriately. Follows commands. In no apparent distress. · Head: Normocephalic and atraumatic. · Eyes: EOMI, conjunctiva normal, (-) scleral icterus. Mucus membranes moist.  · Mouth: Mucus membranes moist. Oropharynx clear. No deviation of the tongue or uvula. Normal dentition. · Neck: (-)  swelling, (-) JVD, trachea midline  · Respiratory: ETT in place, lungs clear to auscultation bilaterally. (-)  wheezes, (-)  rales, (-)  rhonchi. No increased work of breathing or respiratory distress  · Cardiovascular: RRR. (-)  murmurs, (-) gallops,  (-) rubs. S1 and S2 were normal.   · GI:  Abdomen soft, non-tender, non-distended. (+) BS. (-) guarding, (-) rigidity. · Extremities: Warm and well perfused. (-) clubbing, (-) cyanosis. 2+ distal pulses. (-) peripheral edema. (-)Sacral pitting edema. · Neurologic:  No focal neurological deficits.   No tremor or overt seizure activity    Pertinent Labs & Imaging Studies   jorge alberto  CBC with Differential:    Lab Results   Component Value Date    WBC 12.2 03/30/2022    RBC 2.86 03/30/2022    HGB 7.6 03/30/2022    HCT 22.9 03/30/2022     03/30/2022    MCV 80.1 03/30/2022    MCH 26.6 03/30/2022    MCHC 33.2 03/30/2022    RDW 17.6 03/30/2022    NRBC 0.9 03/28/2022    SEGSPCT 67 06/27/2013    METASPCT 1.7 08/10/2020    LYMPHOPCT 9.6 03/30/2022    MONOPCT 1.4 03/30/2022    MYELOPCT 0.9 01/19/2022    BASOPCT 0.1 03/30/2022    MONOSABS 0.17 03/30/2022    LYMPHSABS 1.17 03/30/2022    EOSABS 0.00 03/30/2022    BASOSABS 0.01 03/30/2022     CMP:    Lab Results   Component Value Date     03/30/2022    K 4.8 03/30/2022    K 3.7 03/04/2022    CL 98 03/30/2022    CO2 26 03/30/2022    BUN 15 03/30/2022    CREATININE 1.9 03/30/2022    GFRAA 38 03/30/2022    LABGLOM 38 03/30/2022    GLUCOSE 148 03/30/2022    GLUCOSE 130 05/18/2012    PROT 5.9 03/30/2022    LABALBU 2.2 03/30/2022    LABALBU 4.1 05/18/2012    CALCIUM 7.7 03/30/2022    BILITOT 0.3 03/30/2022    ALKPHOS 183 03/30/2022    AST 17 03/30/2022    ALT 18 03/30/2022     Magnesium:    Lab Results   Component Value Date    MG 1.9 03/30/2022     Phosphorus:    Lab Results   Component Value Date    PHOS 3.5 03/30/2022     Troponin:    Lab Results   Component Value Date    TROPONINI 0.12 05/14/2021     Resident's Assessment and Plan     Assessment     1. Acute metabolic encephalopathy-likely due to hypoglycemia in setting of DM1 and ESRD (CTH negative, negative UDS/SDS, NH3 normal, B12 normal 1 month ago)  2. HTN  3. Acute hypoxic respiratory failure  4. Aspiration pneumonia  5. ESRD on HD TTS  6. IDDM1-A1c 7.7% this admission  7. Hypothyroidism-TSH 5.37, FT4 1.10 normal  8. Acute on chronic anemia-s/p 1 unit PRBCs, chronic component likely due to ESRD  9. History of MDRO UTI  10.  History of C. difficile infection    Plan   Continue sedation, intubation, mechanical ventilation-will wean to extubation when tolerated by patient   BG control this morning on modified SSI per Endocrine   Per endocrinology, will attempt cosyntropin stimulation test at a later date when patient has been stabilized medically    Continue hydrocortisone 40 mg twice daily, continue to taper   Continue Synthroid 88 mcg daily   Zosyn and Flagyl per ID   HD per nephro   Continue Abilify and Celexa   Appreciate Endo, nephro, infectious disease recommendations    PT/OT evaluation: Not indicated at this time  DVT prophylaxis/ GI prophylaxis: Heparin / Pepcid  Disposition: Continue MICU management     Ivonne Preciado DO, PGY-2  Attending physician: Dr. Demario Fowler

## 2022-03-30 NOTE — PROGRESS NOTES
Patient was extubated to 5 liters/min via nasal cannula. Breath Sounds post extubation were clear-diminished. Stridor was not present post extubation. SPO2 was 100%.       Performed by  Juhi Ferrell RCP

## 2022-03-30 NOTE — PROGRESS NOTES
ENDOCRINOLOGY PROGRESS NOTE  Via Tele-health service       Date of admission: 3/27/2022  Date of service: 3/30/2022  Admitting physician: Socorro Ackerman DO   Primary Care Physician: Low Astorga MD  Consultant physician: Abdias Chavez MD     Reason for the consultation:  DM type 1, labile diabetes     History of Present Illness: The history is provided from medical records, pt sedated intubated     Mirna Le is a 36 y.o. old female nursing home resident with PMH of Type I DM, ESRD on PD,HTN,hypothyroidism, infective endocarditis and other listed below admitted to [facility] on 3/27/2022 because of AMS. Endocrine service was consulted for DM management.      subjective   No acute events, extubated, lethargic, BG at goal     Point of care glucose monitoring (Independently reviewed)   Recent Labs     03/30/22  0457 03/30/22  0655 03/30/22  0854 03/30/22  1059 03/30/22  1203 03/30/22  1342 03/30/22  1531 03/30/22  1719   GLUMET 148* 148* 161* 180* 216* 227* 228* 251*       Scheduled Meds:   [Held by provider] insulin glargine-yfgn  3 Units SubCUTAneous QAM    insulin lispro  0-3 Units SubCUTAneous Q4H    hydrocortisone sodium succinate PF  100 mg IntraVENous Q12H    dilTIAZem  90 mg Oral Q6H    mirtazapine  15 mg Oral Nightly    escitalopram  10 mg Oral Daily    ARIPiprazole  5 mg Oral Nightly    rOPINIRole  0.25 mg Oral Nightly    sodium chloride flush  5-40 mL IntraVENous 2 times per day    piperacillin-tazobactam  3,375 mg IntraVENous Q12H    metroNIDAZOLE  500 mg IntraVENous Q8H    levothyroxine  88 mcg Oral Daily    famotidine  10 mg Oral Daily    epoetin nena-epbx  3,000 Units SubCUTAneous Once per day on Mon Wed Fri    Vitamin D  1,000 Units Per NG tube Daily     PRN Meds:   white petrolatum, , BID PRN  sodium chloride flush, 5-40 mL, PRN  polyethylene glycol, 17 g, Daily PRN  acetaminophen, 650 mg, Q6H PRN   Or  acetaminophen, 650 mg, Q6H PRN  glucose, 15 g, PRN  dextrose, 12.5 g, PRN  glucagon (rDNA), 1 mg, PRN  dextrose, 100 mL/hr, PRN  albuterol, 2.5 mg, Q6H PRN  labetalol, 5 mg, Q6H PRN      Continuous Infusions:   dextrose         Review of Systems  Non-obtainable     OBJECTIVE    /85   Pulse 82   Temp 96.8 °F (36 °C) (Temporal)   Resp 12   Ht 5' 4\" (1.626 m)   Wt 141 lb (64 kg)   SpO2 98%   BMI 24.20 kg/m²     Intake/Output Summary (Last 24 hours) at 3/30/2022 1925  Last data filed at 3/30/2022 1500  Gross per 24 hour   Intake 729.44 ml   Output --   Net 729.44 ml       Physical examination:  Due to this being a TeleHealth encounter, evaluation of the following organ systems is limited: Vitals/Constitutional/EENT/Resp/CV/GI//MS/Neuro/Skin/Heme-Lymph-Imm. Modified physical exam through Telemedicine camera    General: lethrgic  Neck: no obvious neck mass. No obvious neck deformity     CVS: no distress   Chest: no distress. Chest is moving with respiration    Extremities:  no visible tremor  Skin: No visible rashes as seen from camera   Musculoskeletal: no visible deformity      Review of Laboratory Data:  I personally reviewed the following labs:   Recent Labs     03/28/22  0530 03/28/22  1455 03/29/22  0500 03/29/22  0718 03/30/22  0505   WBC 6.0  --  12.3*  --  12.2*   RBC 2.80*  --  3.02*  --  2.86*   HGB 7.5*   < > 7.9* 8.1* 7.6*   HCT 22.9*   < > 25.0* 25.6* 22.9*   MCV 81.8  --  82.8  --  80.1   MCH 26.8  --  26.2  --  26.6   MCHC 32.8  --  31.6*  --  33.2   RDW 17.2*  --  17.6*  --  17.6*     --  260  --  263   MPV 10.4  --  10.6  --  10.2    < > = values in this interval not displayed.      Recent Labs     03/28/22  0530 03/28/22  0530 03/29/22  0418 03/29/22  0830 03/29/22  1805 03/30/22  0505 03/30/22  1720      < > 129*   < > 136 133 135   K 4.7   < > 4.9   < > 4.4 4.8 4.6      < > 93*   < > 100 98 100   CO2 22   < > 19*   < > 27 26 23   BUN 38*   < > 22*   < > 11 15 21*   CREATININE 4.7*   < > 2.8*   < > 1.6* 1.9* 2.4*   GLUCOSE 92   < > 448*   < > 126* 148* 247*   CALCIUM 7.9*   < > 7.9*   < > 8.1* 7.7* 7.8*   PROT 6.2*  --  6.3*  --   --  5.9*  --    LABALBU 2.1*  --  2.2*  --   --  2.2*  --    BILITOT 0.5  --  0.3  --   --  0.3  --    ALKPHOS 210*  --  206*  --   --  183*  --    AST 58*  --  21  --   --  17  --    ALT 28  --  21  --   --  18  --     < > = values in this interval not displayed. Beta-Hydroxybutyrate   Date Value Ref Range Status   03/29/2022 >4.50 (H) 0.02 - 0.27 mmol/L Final   03/27/2022 0.10 0.02 - 0.27 mmol/L Final   03/02/2022 2.05 (H) 0.02 - 0.27 mmol/L Final     Lab Results   Component Value Date    LABA1C 7.7 03/02/2022    LABA1C 8.7 12/08/2021    LABA1C 10.2 11/23/2021     Lab Results   Component Value Date/Time    TSH 5.370 (H) 03/28/2022 01:56 AM    T4FREE 1.10 03/28/2022 01:56 AM    W6PSSOG 8.6 11/05/2015 02:20 AM    FT3 1.3 (L) 10/31/2020 10:43 AM    E8BWGPR 36.73 (L) 07/20/2020 02:00 PM     Lab Results   Component Value Date    LABA1C 7.7 03/02/2022    GLUCOSE 247 03/30/2022    GLUCOSE 130 05/18/2012    MALBCR 2949.3 01/15/2020    LABMICR 1740.1 01/15/2020    LABCREA 59 01/15/2020    LABCREA 61 01/15/2020     Lab Results   Component Value Date    TRIG 82 01/14/2020    HDL 33 01/14/2020    LDLCALC 46 01/14/2020    CHOL 95 01/14/2020       Blood culture   Lab Results   Component Value Date    BC 24 Hours no growth 03/28/2022    BC 24 Hours no growth 03/27/2022       Radiology:  XR CHEST PORTABLE   Final Result   1. The lungs are clear. There is no infiltrate or effusion. 2. Stable position of the support lines and tubes. XR CHEST PORTABLE   Final Result   1. There is no acute cardiopulmonary disease   2. Stable position of the support lines and tubes. XR CHEST PORTABLE   Final Result   Infiltrate and or atelectasis at the left lower lobe. Substantially resolved   infiltrate and or atelectasis on the right. New NG tube.          CT HEAD WO CONTRAST   Final Result   No acute intracranial abnormality or hemorrhage. CT ABDOMEN PELVIS WO CONTRAST Additional Contrast? None   Final Result   1. Moderate ascites throughout abdomen and pelvis. 2.  Multiple thick walled loops of small bowel throughout the abdomen could   suggest nonspecific infectious or inflammatory enteritis. 3.  Generalized body wall edema. 4.  Small bilateral pleural effusions with adjacent atelectatic changes. XR ABDOMEN FOR NG/OG/NE TUBE PLACEMENT   Final Result   Enteric tube tip in the body of the stomach. XR CHEST PORTABLE   Final Result   Right perihilar opacity. XR CHEST PORTABLE    (Results Pending)   XR CHEST PORTABLE    (Results Pending)   XR CHEST PORTABLE    (Results Pending)       Medical Records/Labs/Images review:   I personally reviewed and summarized previous records   All labs and imaging were reviewed independently     [address] Nikol[address] Maldonado, a 36 y.o.-old female seen today for inpatient diabetes management     Diabetes Mellitus type I    · The patient is extremely insulin sensitive. with ESRD she is even at much higher risk for hypoglycemia   · we recommend the following sq insulin regimen   · Modified Low dose sliding scale (1u:100>200) Q4hrs   · Continue following BG and adjust insulin regimen    Evaluation for adrenal insufficiency   · With type 1 AM and primary hypothyroidism, will need to r/o AI in this patient (Autoimmune Polyendocrine syndrome type 2, or Shmidth syndrome)   Plan to perform cosyntropin stim test after recovered from her current critical condition   Currently on stress dose steroids     primary Hypothyroidism  · Levothyroxine was adjusted during this admission to 88 mcg daily      ESRD  · Pt at risk for hypoglycemia  · On dialysis, nephrology following    Thank you for allowing us to participate in the care of this patient. Please do not hesitate to contact us with any additional questions.      Edmund Fernandez MD  Endocrinologist, Makayla Carrera

## 2022-03-30 NOTE — PROGRESS NOTES
[facility]  Internal Medicine Residency Program  Progress Note - House Team     Patient:  Fidel Coon 36 y.o. female MRN: [id number]     Date of Service: 3/30/2022     CC: Altered Mental Status     Subjective   Brief Summary:   Ms. Ronal Thornton is a 36year old female who was found obtunded and severely hypoglycemic at her nursing home on 3/27. She has a past medical history of ESRD on hemodialysis, poorly controlled TIDM, HTN, hypothyroidism, and depression. She was intubated for airway protection and admitted to the ICU. Hospital Day: 4    Overnight Events:  Patient's blood glucose yamil to 528 following administration of hydrocortisone and cosyntropin. She developed DKA with pH of 7.311, PCO2 35, and HCO3 of 17.3. Patient received insulin and dropped her blood glucose to 95. Patient now placed on modified sliding scale insulin and has only received 3 units this morning. This AM     Patient was seen and examined this morning at bedside   Patient is alert and responding to commands. Patient was able to move head off of bed, squeeze hands, and wiggle toes. She points to her stomach and responds yes to pain. Pt is otherwise in no distress and has no other complaints.      Objective     Physical Exam:  · Vitals: BP (!) 149/95   Pulse 83   Temp 96.8 °F (36 °C) (Axillary)   Resp 9   Ht 5' 4\" (1.626 m)   Wt 141 lb (64 kg)   SpO2 100%   BMI 24.20 kg/m²     · I & O - 24hr: I/O this shift:  · In: -   · Out: 425 [Urine:425]   · General Appearance: alert, appears stated age, cooperative, fatigued and no distress  · Lung: left lung clear to auscultation and rhonchi RML  · Heart: regular rate and rhythm, S1, S2 normal, no murmur, click, rub or gallop  · Abdomen: soft, non-tender; bowel sounds normal; no masses,  no organomegaly  · Extremities:  extremities normal, atraumatic, no cyanosis or edema and no edema, redness or tenderness in the calves or thighs  · Musculokeletal: No joint swelling, no muscle tenderness. ·   Subject  Pertinent Labs & Imaging Studies   jorge alberto  CBC:   Lab Results   Component Value Date    WBC 12.2 03/30/2022    RBC 2.86 03/30/2022    HGB 7.6 03/30/2022    HCT 22.9 03/30/2022    MCV 80.1 03/30/2022    MCH 26.6 03/30/2022    MCHC 33.2 03/30/2022    RDW 17.6 03/30/2022     03/30/2022    MPV 10.2 03/30/2022     CMP:    Lab Results   Component Value Date     03/30/2022    K 4.8 03/30/2022    K 3.7 03/04/2022    CL 98 03/30/2022    CO2 26 03/30/2022    BUN 15 03/30/2022    CREATININE 1.9 03/30/2022    GFRAA 38 03/30/2022    LABGLOM 38 03/30/2022    GLUCOSE 148 03/30/2022    GLUCOSE 130 05/18/2012    PROT 5.9 03/30/2022    LABALBU 2.2 03/30/2022    LABALBU 4.1 05/18/2012    CALCIUM 7.7 03/30/2022    BILITOT 0.3 03/30/2022    ALKPHOS 183 03/30/2022    AST 17 03/30/2022    ALT 18 03/30/2022       XR CHEST PORTABLE   Preliminary Result   1. The lungs are clear. There is no infiltrate or effusion. 2. Stable position of the support lines and tubes. XR CHEST PORTABLE   Final Result   1. There is no acute cardiopulmonary disease   2. Stable position of the support lines and tubes. XR CHEST PORTABLE   Final Result   Infiltrate and or atelectasis at the left lower lobe. Substantially resolved   infiltrate and or atelectasis on the right. New NG tube. CT HEAD WO CONTRAST   Final Result   No acute intracranial abnormality or hemorrhage. CT ABDOMEN PELVIS WO CONTRAST Additional Contrast? None   Final Result   1. Moderate ascites throughout abdomen and pelvis. 2.  Multiple thick walled loops of small bowel throughout the abdomen could   suggest nonspecific infectious or inflammatory enteritis. 3.  Generalized body wall edema. 4.  Small bilateral pleural effusions with adjacent atelectatic changes. XR ABDOMEN FOR NG/OG/NE TUBE PLACEMENT   Final Result   Enteric tube tip in the body of the stomach.          XR CHEST PORTABLE   Final Result   Right perihilar opacity. XR CHEST PORTABLE    (Results Pending)   XR CHEST PORTABLE    (Results Pending)   XR CHEST PORTABLE    (Results Pending)        Resident's Assessment and Plan     Assessment and Plan:    1. Acute metabolic encephalopathy 2/2 hypoglycemia  - patient alert and able to follow commands. - repeat cosyntropin test after resolution of acute illness. - serum insulin and c-peptide levels pending.  - blood cultures pending  - continue hydrocortisone     2. TIDM  - pt highly sensitive to insulin. - went into DKA yesterday and became hypoglycemic following administration of insulin. - Patient placed on a modified sliding scale with a max dose of 3 units of Lantus daily per endo recs.      3. Acute respiratory failure  - concern for aspiration pneumonia, receiving IV zosyn per ID recs. - X-ray 3/30 demonstrates no acute cardiopulmonary disease.   - failed weaning parameters yesterday. - wean off ventilator as tolerated. 4. Recurrent C. Difficile  - continue flagyl daily.      5. End Stage Renal Disease  - Continue dialysis Tues/Thurs/Saturday     6. Hypothyroidism  - continue levothyroxine daily.      7.  Depression  - continue Abilify and Celexa       DVT prophylaxis/ GI prophylaxis: Heparin/Pepcid  Disposition: continue current care   Michael Gates, MS4   Attending physician: Dr. Jerome Mac

## 2022-03-31 ENCOUNTER — APPOINTMENT (OUTPATIENT)
Dept: GENERAL RADIOLOGY | Age: 32
DRG: 420 | End: 2022-03-31
Payer: COMMERCIAL

## 2022-03-31 LAB
ABO/RH: NORMAL
ALBUMIN SERPL-MCNC: 2.4 G/DL (ref 3.5–5.2)
ALP BLD-CCNC: 186 U/L (ref 35–104)
ALT SERPL-CCNC: 26 U/L (ref 0–32)
ANION GAP SERPL CALCULATED.3IONS-SCNC: 11 MMOL/L (ref 7–16)
ANION GAP SERPL CALCULATED.3IONS-SCNC: 13 MMOL/L (ref 7–16)
ANISOCYTOSIS: ABNORMAL
ANISOCYTOSIS: ABNORMAL
ANTIBODY SCREEN: NORMAL
APTT: 33.3 SEC (ref 24.5–35.1)
AST SERPL-CCNC: 43 U/L (ref 0–31)
BASOPHILIC STIPPLING: ABNORMAL
BASOPHILIC STIPPLING: ABNORMAL
BASOPHILS ABSOLUTE: 0 E9/L (ref 0–0.2)
BASOPHILS ABSOLUTE: 0 E9/L (ref 0–0.2)
BASOPHILS RELATIVE PERCENT: 0.1 % (ref 0–2)
BASOPHILS RELATIVE PERCENT: 0.1 % (ref 0–2)
BILIRUB SERPL-MCNC: <0.2 MG/DL (ref 0–1.2)
BILIRUBIN DIRECT: <0.2 MG/DL (ref 0–0.3)
BILIRUBIN, INDIRECT: ABNORMAL MG/DL (ref 0–1)
BUN BLDV-MCNC: 10 MG/DL (ref 6–20)
BUN BLDV-MCNC: 25 MG/DL (ref 6–20)
BURR CELLS: ABNORMAL
CALCIUM IONIZED: 1.13 MMOL/L (ref 1.15–1.33)
CALCIUM SERPL-MCNC: 7.5 MG/DL (ref 8.6–10.2)
CALCIUM SERPL-MCNC: 8.2 MG/DL (ref 8.6–10.2)
CHLORIDE BLD-SCNC: 98 MMOL/L (ref 98–107)
CHLORIDE BLD-SCNC: 99 MMOL/L (ref 98–107)
CO2: 23 MMOL/L (ref 22–29)
CO2: 24 MMOL/L (ref 22–29)
CREAT SERPL-MCNC: 1.3 MG/DL (ref 0.5–1)
CREAT SERPL-MCNC: 2.7 MG/DL (ref 0.5–1)
EOSINOPHILS ABSOLUTE: 0 E9/L (ref 0.05–0.5)
EOSINOPHILS ABSOLUTE: 0 E9/L (ref 0.05–0.5)
EOSINOPHILS RELATIVE PERCENT: 0 % (ref 0–6)
EOSINOPHILS RELATIVE PERCENT: 0 % (ref 0–6)
GFR AFRICAN AMERICAN: 25
GFR AFRICAN AMERICAN: 58
GFR NON-AFRICAN AMERICAN: 25 ML/MIN/1.73
GFR NON-AFRICAN AMERICAN: 58 ML/MIN/1.73
GLUCOSE BLD-MCNC: 234 MG/DL (ref 74–99)
GLUCOSE BLD-MCNC: 292 MG/DL (ref 74–99)
HCT VFR BLD CALC: 21.3 % (ref 34–48)
HCT VFR BLD CALC: 21.3 % (ref 34–48)
HCT VFR BLD CALC: 23.8 % (ref 34–48)
HCT VFR BLD CALC: 24.7 % (ref 34–48)
HEMOGLOBIN: 6.8 G/DL (ref 11.5–15.5)
HEMOGLOBIN: 7 G/DL (ref 11.5–15.5)
HEMOGLOBIN: 7.7 G/DL (ref 11.5–15.5)
HYPOCHROMIA: ABNORMAL
HYPOCHROMIA: ABNORMAL
IMMATURE RETIC FRACT: 4.5 % (ref 3–15.9)
INR BLD: 1.1
INSULIN: 2 UIU/ML
LYMPHOCYTES ABSOLUTE: 0.24 E9/L (ref 1.5–4)
LYMPHOCYTES ABSOLUTE: 0.51 E9/L (ref 1.5–4)
LYMPHOCYTES RELATIVE PERCENT: 1.7 % (ref 20–42)
LYMPHOCYTES RELATIVE PERCENT: 4.4 % (ref 20–42)
MAGNESIUM: 2 MG/DL (ref 1.6–2.6)
MCH RBC QN AUTO: 26.2 PG (ref 26–35)
MCH RBC QN AUTO: 26.4 PG (ref 26–35)
MCHC RBC AUTO-ENTMCNC: 31.2 % (ref 32–34.5)
MCHC RBC AUTO-ENTMCNC: 31.9 % (ref 32–34.5)
MCV RBC AUTO: 82.6 FL (ref 80–99.9)
MCV RBC AUTO: 84 FL (ref 80–99.9)
METER GLUCOSE: 188 MG/DL (ref 74–99)
METER GLUCOSE: 189 MG/DL (ref 74–99)
METER GLUCOSE: 229 MG/DL (ref 74–99)
METER GLUCOSE: 230 MG/DL (ref 74–99)
METER GLUCOSE: 254 MG/DL (ref 74–99)
METER GLUCOSE: 264 MG/DL (ref 74–99)
METER GLUCOSE: 309 MG/DL (ref 74–99)
MONOCYTES ABSOLUTE: 0.12 E9/L (ref 0.1–0.95)
MONOCYTES ABSOLUTE: 0.26 E9/L (ref 0.1–0.95)
MONOCYTES RELATIVE PERCENT: 0.9 % (ref 2–12)
MONOCYTES RELATIVE PERCENT: 1.7 % (ref 2–12)
NEUTROPHILS ABSOLUTE: 11.83 E9/L (ref 1.8–7.3)
NEUTROPHILS ABSOLUTE: 12.03 E9/L (ref 1.8–7.3)
NEUTROPHILS RELATIVE PERCENT: 93.9 % (ref 43–80)
NEUTROPHILS RELATIVE PERCENT: 97.4 % (ref 43–80)
OVALOCYTES: ABNORMAL
OVALOCYTES: ABNORMAL
PATHOLOGIST REVIEW: NORMAL
PDW BLD-RTO: 17.7 FL (ref 11.5–15)
PDW BLD-RTO: 18.2 FL (ref 11.5–15)
PHOSPHORUS: 5 MG/DL (ref 2.5–4.5)
PLATELET # BLD: 210 E9/L (ref 130–450)
PLATELET # BLD: 280 E9/L (ref 130–450)
PMV BLD AUTO: 10.2 FL (ref 7–12)
PMV BLD AUTO: 10.3 FL (ref 7–12)
POIKILOCYTES: ABNORMAL
POIKILOCYTES: ABNORMAL
POLYCHROMASIA: ABNORMAL
POLYCHROMASIA: ABNORMAL
POTASSIUM SERPL-SCNC: 3.3 MMOL/L (ref 3.5–5)
POTASSIUM SERPL-SCNC: 4.3 MMOL/L (ref 3.5–5)
PROCALCITONIN: 22.16 NG/ML (ref 0–0.08)
PROTHROMBIN TIME: 12.1 SEC (ref 9.3–12.4)
RBC # BLD: 2.58 E12/L (ref 3.5–5.5)
RBC # BLD: 2.94 E12/L (ref 3.5–5.5)
RETIC HGB EQUIVALENT: 15.4 PG (ref 28.2–36.6)
RETICULOCYTE ABSOLUTE COUNT: 0.08 E12/L
RETICULOCYTE COUNT PCT: 3.1 % (ref 0.4–1.9)
SCHISTOCYTES: ABNORMAL
SODIUM BLD-SCNC: 133 MMOL/L (ref 132–146)
SODIUM BLD-SCNC: 135 MMOL/L (ref 132–146)
STOMATOCYTES: ABNORMAL
STOMATOCYTES: ABNORMAL
TARGET CELLS: ABNORMAL
TARGET CELLS: ABNORMAL
TEAR DROP CELLS: ABNORMAL
TOTAL PROTEIN: 6.2 G/DL (ref 6.4–8.3)
TOXIC GRANULATION: ABNORMAL
VACUOLATED NEUTROPHILS: ABNORMAL
WBC # BLD: 12.2 E9/L (ref 4.5–11.5)
WBC # BLD: 12.8 E9/L (ref 4.5–11.5)

## 2022-03-31 PROCEDURE — 6360000002 HC RX W HCPCS: Performed by: INTERNAL MEDICINE

## 2022-03-31 PROCEDURE — 2580000003 HC RX 258: Performed by: INTERNAL MEDICINE

## 2022-03-31 PROCEDURE — 86850 RBC ANTIBODY SCREEN: CPT

## 2022-03-31 PROCEDURE — 80048 BASIC METABOLIC PNL TOTAL CA: CPT

## 2022-03-31 PROCEDURE — 6370000000 HC RX 637 (ALT 250 FOR IP): Performed by: INTERNAL MEDICINE

## 2022-03-31 PROCEDURE — P9016 RBC LEUKOCYTES REDUCED: HCPCS

## 2022-03-31 PROCEDURE — 76937 US GUIDE VASCULAR ACCESS: CPT

## 2022-03-31 PROCEDURE — 2580000003 HC RX 258: Performed by: NURSE PRACTITIONER

## 2022-03-31 PROCEDURE — 36410 VNPNXR 3YR/> PHY/QHP DX/THER: CPT

## 2022-03-31 PROCEDURE — 84145 PROCALCITONIN (PCT): CPT

## 2022-03-31 PROCEDURE — 2060000000 HC ICU INTERMEDIATE R&B

## 2022-03-31 PROCEDURE — 6370000000 HC RX 637 (ALT 250 FOR IP): Performed by: NURSE PRACTITIONER

## 2022-03-31 PROCEDURE — 97535 SELF CARE MNGMENT TRAINING: CPT

## 2022-03-31 PROCEDURE — 85730 THROMBOPLASTIN TIME PARTIAL: CPT

## 2022-03-31 PROCEDURE — 83735 ASSAY OF MAGNESIUM: CPT

## 2022-03-31 PROCEDURE — 85014 HEMATOCRIT: CPT

## 2022-03-31 PROCEDURE — 82330 ASSAY OF CALCIUM: CPT

## 2022-03-31 PROCEDURE — 86923 COMPATIBILITY TEST ELECTRIC: CPT

## 2022-03-31 PROCEDURE — 87070 CULTURE OTHR SPECIMN AEROBIC: CPT

## 2022-03-31 PROCEDURE — 80076 HEPATIC FUNCTION PANEL: CPT

## 2022-03-31 PROCEDURE — 85045 AUTOMATED RETICULOCYTE COUNT: CPT

## 2022-03-31 PROCEDURE — C1751 CATH, INF, PER/CENT/MIDLINE: HCPCS

## 2022-03-31 PROCEDURE — 6360000002 HC RX W HCPCS: Performed by: NURSE PRACTITIONER

## 2022-03-31 PROCEDURE — 90935 HEMODIALYSIS ONE EVALUATION: CPT | Performed by: INTERNAL MEDICINE

## 2022-03-31 PROCEDURE — 2500000003 HC RX 250 WO HCPCS: Performed by: INTERNAL MEDICINE

## 2022-03-31 PROCEDURE — S5553 INSULIN LONG ACTING 5 U: HCPCS | Performed by: INTERNAL MEDICINE

## 2022-03-31 PROCEDURE — 97530 THERAPEUTIC ACTIVITIES: CPT

## 2022-03-31 PROCEDURE — 86901 BLOOD TYPING SEROLOGIC RH(D): CPT

## 2022-03-31 PROCEDURE — 36592 COLLECT BLOOD FROM PICC: CPT

## 2022-03-31 PROCEDURE — 97165 OT EVAL LOW COMPLEX 30 MIN: CPT

## 2022-03-31 PROCEDURE — 85025 COMPLETE CBC W/AUTO DIFF WBC: CPT

## 2022-03-31 PROCEDURE — 2700000000 HC OXYGEN THERAPY PER DAY

## 2022-03-31 PROCEDURE — 86900 BLOOD TYPING SEROLOGIC ABO: CPT

## 2022-03-31 PROCEDURE — 99233 SBSQ HOSP IP/OBS HIGH 50: CPT | Performed by: INTERNAL MEDICINE

## 2022-03-31 PROCEDURE — 85018 HEMOGLOBIN: CPT

## 2022-03-31 PROCEDURE — 97161 PT EVAL LOW COMPLEX 20 MIN: CPT

## 2022-03-31 PROCEDURE — 6370000000 HC RX 637 (ALT 250 FOR IP): Performed by: STUDENT IN AN ORGANIZED HEALTH CARE EDUCATION/TRAINING PROGRAM

## 2022-03-31 PROCEDURE — 84100 ASSAY OF PHOSPHORUS: CPT

## 2022-03-31 PROCEDURE — 82962 GLUCOSE BLOOD TEST: CPT

## 2022-03-31 PROCEDURE — 85610 PROTHROMBIN TIME: CPT

## 2022-03-31 RX ORDER — SODIUM CHLORIDE 0.9 % (FLUSH) 0.9 %
5-40 SYRINGE (ML) INJECTION EVERY 12 HOURS SCHEDULED
Status: DISCONTINUED | OUTPATIENT
Start: 2022-03-31 | End: 2022-04-19

## 2022-03-31 RX ORDER — SODIUM CHLORIDE 0.9 % (FLUSH) 0.9 %
5-40 SYRINGE (ML) INJECTION PRN
Status: DISCONTINUED | OUTPATIENT
Start: 2022-03-31 | End: 2022-04-19

## 2022-03-31 RX ORDER — SEVELAMER CARBONATE 800 MG/1
800 TABLET, FILM COATED ORAL
Status: DISCONTINUED | OUTPATIENT
Start: 2022-03-31 | End: 2022-04-03

## 2022-03-31 RX ORDER — METRONIDAZOLE 500 MG/1
500 TABLET ORAL EVERY 8 HOURS SCHEDULED
Status: DISCONTINUED | OUTPATIENT
Start: 2022-03-31 | End: 2022-04-04

## 2022-03-31 RX ORDER — SODIUM CHLORIDE 9 MG/ML
INJECTION, SOLUTION INTRAVENOUS PRN
Status: DISCONTINUED | OUTPATIENT
Start: 2022-03-31 | End: 2022-04-19 | Stop reason: SDUPTHER

## 2022-03-31 RX ORDER — INSULIN GLARGINE-YFGN 100 [IU]/ML
1 INJECTION, SOLUTION SUBCUTANEOUS NIGHTLY
Status: DISCONTINUED | OUTPATIENT
Start: 2022-03-31 | End: 2022-04-02

## 2022-03-31 RX ORDER — ONDANSETRON 2 MG/ML
4 INJECTION INTRAMUSCULAR; INTRAVENOUS EVERY 6 HOURS PRN
Status: DISCONTINUED | OUTPATIENT
Start: 2022-03-31 | End: 2022-04-06

## 2022-03-31 RX ORDER — TRAMADOL HYDROCHLORIDE 50 MG/1
50 TABLET ORAL ONCE
Status: COMPLETED | OUTPATIENT
Start: 2022-03-31 | End: 2022-03-31

## 2022-03-31 RX ORDER — HEPARIN SODIUM (PORCINE) LOCK FLUSH IV SOLN 100 UNIT/ML 100 UNIT/ML
1 SOLUTION INTRAVENOUS EVERY 12 HOURS SCHEDULED
Status: DISCONTINUED | OUTPATIENT
Start: 2022-03-31 | End: 2022-04-25 | Stop reason: HOSPADM

## 2022-03-31 RX ORDER — SODIUM CHLORIDE 9 MG/ML
INJECTION, SOLUTION INTRAVENOUS PRN
Status: DISCONTINUED | OUTPATIENT
Start: 2022-03-31 | End: 2022-04-13

## 2022-03-31 RX ORDER — HEPARIN SODIUM (PORCINE) LOCK FLUSH IV SOLN 100 UNIT/ML 100 UNIT/ML
1 SOLUTION INTRAVENOUS PRN
Status: DISCONTINUED | OUTPATIENT
Start: 2022-03-31 | End: 2022-04-25 | Stop reason: HOSPADM

## 2022-03-31 RX ORDER — POTASSIUM CHLORIDE 20 MEQ/1
40 TABLET, EXTENDED RELEASE ORAL ONCE
Status: COMPLETED | OUTPATIENT
Start: 2022-03-31 | End: 2022-03-31

## 2022-03-31 RX ADMIN — LIDOCAINE HYDROCHLORIDE 5 ML: 10 INJECTION, SOLUTION EPIDURAL; INFILTRATION; INTRACAUDAL; PERINEURAL at 17:37

## 2022-03-31 RX ADMIN — DILTIAZEM HYDROCHLORIDE 90 MG: 30 TABLET, FILM COATED ORAL at 19:38

## 2022-03-31 RX ADMIN — TRAMADOL HYDROCHLORIDE 50 MG: 50 TABLET, COATED ORAL at 21:21

## 2022-03-31 RX ADMIN — SEVELAMER CARBONATE 800 MG: 800 TABLET, FILM COATED ORAL at 17:18

## 2022-03-31 RX ADMIN — INSULIN GLARGINE-YFGN 1 UNITS: 100 INJECTION, SOLUTION SUBCUTANEOUS at 21:02

## 2022-03-31 RX ADMIN — ONDANSETRON 4 MG: 2 INJECTION INTRAMUSCULAR; INTRAVENOUS at 21:21

## 2022-03-31 RX ADMIN — DILTIAZEM HYDROCHLORIDE 90 MG: 30 TABLET, FILM COATED ORAL at 15:45

## 2022-03-31 RX ADMIN — DILTIAZEM HYDROCHLORIDE 90 MG: 30 TABLET, FILM COATED ORAL at 06:32

## 2022-03-31 RX ADMIN — METRONIDAZOLE 500 MG: 500 TABLET ORAL at 21:24

## 2022-03-31 RX ADMIN — FAMOTIDINE 10 MG: 20 TABLET ORAL at 08:46

## 2022-03-31 RX ADMIN — INSULIN LISPRO 1 UNITS: 100 INJECTION, SOLUTION INTRAVENOUS; SUBCUTANEOUS at 21:11

## 2022-03-31 RX ADMIN — ESCITALOPRAM 10 MG: 10 TABLET, FILM COATED ORAL at 08:46

## 2022-03-31 RX ADMIN — MIRTAZAPINE 15 MG: 15 TABLET, FILM COATED ORAL at 21:02

## 2022-03-31 RX ADMIN — PIPERACILLIN AND TAZOBACTAM 3375 MG: 3; .375 INJECTION, POWDER, LYOPHILIZED, FOR SOLUTION INTRAVENOUS at 08:46

## 2022-03-31 RX ADMIN — LEVOTHYROXINE SODIUM 88 MCG: 0.09 TABLET ORAL at 06:29

## 2022-03-31 RX ADMIN — ACETAMINOPHEN 650 MG: 325 TABLET ORAL at 09:54

## 2022-03-31 RX ADMIN — INSULIN LISPRO 1 UNITS: 100 INJECTION, SOLUTION INTRAVENOUS; SUBCUTANEOUS at 06:32

## 2022-03-31 RX ADMIN — SODIUM CHLORIDE, PRESERVATIVE FREE 100 UNITS: 5 INJECTION INTRAVENOUS at 21:25

## 2022-03-31 RX ADMIN — SODIUM CHLORIDE, PRESERVATIVE FREE 10 ML: 5 INJECTION INTRAVENOUS at 08:57

## 2022-03-31 RX ADMIN — Medication 10 ML: at 21:02

## 2022-03-31 RX ADMIN — CALCIUM GLUCONATE 1000 MG: 98 INJECTION, SOLUTION INTRAVENOUS at 17:18

## 2022-03-31 RX ADMIN — HYDROCORTISONE SODIUM SUCCINATE 50 MG: 100 INJECTION, POWDER, FOR SOLUTION INTRAMUSCULAR; INTRAVENOUS at 21:01

## 2022-03-31 RX ADMIN — POTASSIUM CHLORIDE 40 MEQ: 20 TABLET, EXTENDED RELEASE ORAL at 19:38

## 2022-03-31 RX ADMIN — DILTIAZEM HYDROCHLORIDE 90 MG: 30 TABLET, FILM COATED ORAL at 01:06

## 2022-03-31 RX ADMIN — METRONIDAZOLE 500 MG: 500 TABLET ORAL at 15:46

## 2022-03-31 RX ADMIN — ARIPIPRAZOLE 5 MG: 5 TABLET ORAL at 21:01

## 2022-03-31 RX ADMIN — INSULIN LISPRO 1 UNITS: 100 INJECTION, SOLUTION INTRAVENOUS; SUBCUTANEOUS at 18:24

## 2022-03-31 RX ADMIN — Medication 5 MG: at 21:02

## 2022-03-31 RX ADMIN — INSULIN LISPRO 1 UNITS: 100 INJECTION, SOLUTION INTRAVENOUS; SUBCUTANEOUS at 08:47

## 2022-03-31 RX ADMIN — Medication 1000 UNITS: at 08:47

## 2022-03-31 RX ADMIN — INSULIN LISPRO 2 UNITS: 100 INJECTION, SOLUTION INTRAVENOUS; SUBCUTANEOUS at 02:12

## 2022-03-31 RX ADMIN — ROPINIROLE 0.25 MG: 0.25 TABLET, FILM COATED ORAL at 21:01

## 2022-03-31 RX ADMIN — HYDROCORTISONE SODIUM SUCCINATE 100 MG: 100 INJECTION, POWDER, FOR SOLUTION INTRAMUSCULAR; INTRAVENOUS at 08:47

## 2022-03-31 RX ADMIN — METRONIDAZOLE 500 MG: 500 INJECTION, SOLUTION INTRAVENOUS at 02:41

## 2022-03-31 RX ADMIN — ACETAMINOPHEN 650 MG: 325 TABLET ORAL at 19:38

## 2022-03-31 ASSESSMENT — PAIN DESCRIPTION - DESCRIPTORS
DESCRIPTORS: HEADACHE
DESCRIPTORS: HEADACHE

## 2022-03-31 ASSESSMENT — PAIN SCALES - GENERAL
PAINLEVEL_OUTOF10: 2
PAINLEVEL_OUTOF10: 7
PAINLEVEL_OUTOF10: 0
PAINLEVEL_OUTOF10: 0
PAINLEVEL_OUTOF10: 7
PAINLEVEL_OUTOF10: 0
PAINLEVEL_OUTOF10: 7
PAINLEVEL_OUTOF10: 0
PAINLEVEL_OUTOF10: 3
PAINLEVEL_OUTOF10: 0

## 2022-03-31 ASSESSMENT — PAIN DESCRIPTION - FREQUENCY
FREQUENCY: CONTINUOUS
FREQUENCY: CONTINUOUS

## 2022-03-31 ASSESSMENT — PAIN DESCRIPTION - ONSET
ONSET: ON-GOING
ONSET: ON-GOING

## 2022-03-31 ASSESSMENT — PAIN DESCRIPTION - PROGRESSION
CLINICAL_PROGRESSION: NOT CHANGED
CLINICAL_PROGRESSION: NOT CHANGED

## 2022-03-31 ASSESSMENT — PAIN DESCRIPTION - ORIENTATION
ORIENTATION: MID
ORIENTATION: MID

## 2022-03-31 ASSESSMENT — PAIN DESCRIPTION - LOCATION
LOCATION: HEAD
LOCATION: HEAD

## 2022-03-31 ASSESSMENT — PAIN DESCRIPTION - PAIN TYPE
TYPE: ACUTE PAIN

## 2022-03-31 NOTE — PROGRESS NOTES
Department of Internal Medicine  Infectious Diseases  Progress  Note      C/C : Aspiration pneumonia     Pt is extubated   Awake and alert  Denies fever or chills  Reports SOB   Afebrile       Current Facility-Administered Medications   Medication Dose Route Frequency Provider Last Rate Last Admin    0.9 % sodium chloride infusion   IntraVENous PRN Jacinda L Littlejohn, DO        hydrocortisone sodium succinate PF (SOLU-CORTEF) injection 50 mg  50 mg IntraVENous Q12H Terry Goodwin MD        calcium gluconate 1,000 mg in dextrose 5 % 100 mL IVPB  1,000 mg IntraVENous Once HEBER Manrique - CNP        sevelamer (RENVELA) tablet 800 mg  800 mg Oral TID WC HEBER Manrique - CNP        white petrolatum ointment   Topical BID PRN Terry Goodwin MD        [Held by provider] insulin glargine-yfgn (SEMGLEE-YFGN) injection vial 3 Units  3 Units SubCUTAneous QAM Zeke Davis MD        insulin lispro (HUMALOG) injection vial 0-3 Units  0-3 Units SubCUTAneous Q4H Terry Goodwin MD   1 Units at 03/31/22 0847    melatonin disintegrating tablet 5 mg  5 mg Oral Nightly Jacinda L Littlejohn, DO   5 mg at 03/30/22 2343    dilTIAZem (CARDIZEM) tablet 90 mg  90 mg Oral Q6H Liliya Norris MD   90 mg at 03/31/22 6925    mirtazapine (REMERON) tablet 15 mg  15 mg Oral Nightly Jacinda L Littlejohn, DO   15 mg at 03/30/22 2106    escitalopram (LEXAPRO) tablet 10 mg  10 mg Oral Daily Jacinda L Littlejohn, DO   10 mg at 03/31/22 0846    ARIPiprazole (ABILIFY) tablet 5 mg  5 mg Oral Nightly Jacinda L Littlejohn, DO   5 mg at 03/30/22 2106    rOPINIRole (REQUIP) tablet 0.25 mg  0.25 mg Oral Nightly Jacinda L Littlejohn, DO   0.25 mg at 03/30/22 2106    sodium chloride flush 0.9 % injection 5-40 mL  5-40 mL IntraVENous 2 times per day Oskar Ely, DO   10 mL at 03/31/22 0857    sodium chloride flush 0.9 % injection 5-40 mL  5-40 mL IntraVENous PRN Jacinda L Littlejohn, DO        polyethylene glycol (GLYCOLAX) packet 17 g  17 g Oral Daily PRN Shakiraard Sandusky, DO        acetaminophen (TYLENOL) tablet 650 mg  650 mg Oral Q6H PRN Ellard Sandusky, DO   650 mg at 03/31/22 0954    Or    acetaminophen (TYLENOL) suppository 650 mg  650 mg Rectal Q6H PRN Jacinda L Littlejohn, DO        piperacillin-tazobactam (ZOSYN) 3,375 mg in dextrose 5 % 100 mL IVPB extended infusion (mini-bag)  3,375 mg IntraVENous Q12H Jacinda L Littlejohn, DO   Stopped at 03/31/22 1213    glucose (GLUTOSE) 40 % oral gel 15 g  15 g Oral PRN Jacinda L Littlejohn, DO        dextrose 50 % IV solution  12.5 g IntraVENous PRN Jacinda L Littlejohn, DO   12.5 g at 03/28/22 0358    glucagon (rDNA) injection 1 mg  1 mg IntraMUSCular PRN Jacinda L Littlejohn, DO        dextrose 5 % solution  100 mL/hr IntraVENous PRN Jacinda L Littlejohn, DO        albuterol (PROVENTIL) nebulizer solution 2.5 mg  2.5 mg Nebulization Q6H PRN Rubina Chilel MD        metronidazole (FLAGYL) 500 mg in NaCl 100 mL IVPB premix  500 mg IntraVENous Q8H Ishan Dee MD   Stopped at 03/31/22 0341    levothyroxine (SYNTHROID) tablet 88 mcg  88 mcg Oral Daily Vinnie Rodriguez MD   88 mcg at 03/31/22 8135    famotidine (PEPCID) tablet 10 mg  10 mg Oral Daily Joon Martinez MD   10 mg at 03/31/22 0846    epoetin nena-epbx (RETACRIT) injection 3,000 Units  3,000 Units SubCUTAneous Once per day on Mon Wed Fri HEBER Melissa - CNP   3,000 Units at 03/30/22 1516    Vitamin D (CHOLECALCIFEROL) tablet 1,000 Units  1,000 Units Per NG tube Daily LanHEBER Weaver - CNP   1,000 Units at 03/31/22 0847    labetalol (NORMODYNE;TRANDATE) injection 5 mg  5 mg IntraVENous Q6H PRN Darryle Noun, MD           REVIEW OF SYSTEMS:      CONSTITUTIONAL: Denies fever or chills   HEENT: Denies sore throat   RESPIRATORY: SOB   CARDIOVASCULAR:  Denies palpitation  GASTROINTESTINAL: Denies abdomen pain , no diarrhea   GENITOURINARY:  ESRD on HD   INTEGUMENT: No rash    HEMATOLOGIC/LYMPHATIC:  No bleeding Blanchie Bevels MUSCULOSKELETAL:  Denies leg swelling    NEUROLOGICAL:  denies loss of consciousness or weakness        PHYSICAL EXAM:      Vitals:     /74   Pulse 82   Temp 97.7 °F (36.5 °C)   Resp 10   Ht 5' 4\" (1.626 m)   Wt 141 lb 1.5 oz (64 kg)   SpO2 96%   BMI 24.22 kg/m²       General Appearance:    Extubated, awake , alert, no distress    Head:    Normocephalic, atraumatic   Eyes:    +  pallor, no icterus,   Ears:    No obvious deformity or drainage.    Nose:   No nasal drainage   Throat:   Mucosa moist    Neck:   Supple, no lymphadenopathy   Lungs:     Crackles +   Heart:    Tachycardia    Abdomen:     Soft, non-tender, bowel sounds present    Extremities:   No edema,   Pulses:   Dorsalis pedis palpable    Skin:   no rashes , right chest Tesio catheter    CBC with Differential:      Lab Results   Component Value Date    WBC 12.8 03/31/2022    RBC 2.94 03/31/2022    HGB 7.7 03/31/2022    HCT 23.8 03/31/2022    HCT 24.7 03/31/2022     03/31/2022    MCV 84.0 03/31/2022    MCH 26.2 03/31/2022    MCHC 31.2 03/31/2022    RDW 18.2 03/31/2022    NRBC 0.9 03/28/2022    SEGSPCT 67 06/27/2013    METASPCT 1.7 08/10/2020    LYMPHOPCT 4.4 03/31/2022    MONOPCT 1.7 03/31/2022    MYELOPCT 0.9 01/19/2022    BASOPCT 0.1 03/31/2022    MONOSABS 0.26 03/31/2022    LYMPHSABS 0.51 03/31/2022    EOSABS 0.00 03/31/2022    BASOSABS 0.00 03/31/2022       CMP     Lab Results   Component Value Date     03/31/2022    K 4.3 03/31/2022    K 3.7 03/04/2022    CL 99 03/31/2022    CO2 23 03/31/2022    BUN 25 03/31/2022    CREATININE 2.7 03/31/2022    GFRAA 25 03/31/2022    LABGLOM 25 03/31/2022    GLUCOSE 292 03/31/2022    GLUCOSE 130 05/18/2012    PROT 6.2 03/31/2022    LABALBU 2.4 03/31/2022    LABALBU 4.1 05/18/2012    CALCIUM 7.5 03/31/2022    BILITOT <0.2 03/31/2022    ALKPHOS 186 03/31/2022    AST 43 03/31/2022    ALT 26 03/31/2022         Hepatic Function Panel:    Lab Results   Component Value Date    ALKPHOS 186 03/31/2022    ALT 26 03/31/2022    AST 43 03/31/2022    PROT 6.2 03/31/2022    BILITOT <0.2 03/31/2022    BILIDIR <0.2 03/31/2022    IBILI see below 03/31/2022    LABALBU 2.4 03/31/2022    LABALBU 4.1 05/18/2012       PT/INR:    Lab Results   Component Value Date    PROTIME 12.1 03/31/2022    PROTIME 13.6 08/14/2011    INR 1.1 03/31/2022       TSH:    Lab Results   Component Value Date    TSH 5.370 03/28/2022       U/A:    Lab Results   Component Value Date    COLORU Yellow 03/28/2022    PHUR 8.5 03/28/2022    LABCAST RARE 01/12/2020    WBCUA >20 03/28/2022    WBCUA 0-1 05/18/2012    RBCUA 5-10 03/28/2022    RBCUA 1-3 08/01/2013    MUCUS Present 02/12/2016    TRICHOMONAS Present 07/17/2020    YEAST RARE 05/24/2018    BACTERIA MANY 03/28/2022    CLARITYU Clear 03/28/2022    SPECGRAV 1.015 03/28/2022    LEUKOCYTESUR LARGE 03/28/2022    UROBILINOGEN 0.2 03/28/2022    BILIRUBINUR Negative 03/28/2022    BILIRUBINUR SMALL 05/18/2012    BLOODU MODERATE 03/28/2022    GLUCOSEU 100 03/28/2022    GLUCOSEU >=1000 05/18/2012    AMORPHOUS RARE 11/01/2019       ABG:    Lab Results   Component Value Date    ESC0FAP 14.0 02/15/2021    R0KFHPUQ 97.7 09/08/2012    PHART 7.345 07/20/2012    GZY4XHT 35.0 10/29/2019    PO2ART 91.5 10/29/2019       MICROBIOLOGY:    Blood culture - neg to date   Sputum -  Specimen: Sputum, Suctioned Updated: 03/29/22 0826      CULTURE, RESPIRATORY Oral Pharyngeal Celine present    Smear, Respiratory --    Few Polymorphonuclear leukocytes   Rare Epithelial cells   Few Gram positive coccobacillary organisms    Narrative:             Radiology :    Chest X ray : LLL infiltrates       IMPRESSION:     1. Aspiration pneumonia, elevated procalcitonin, leukocytosis   2. ESRD on HD , DM recurrent hypoglycemia   3. Resp failure - intubated   4. Hx of recurrent C diff infection     RECOMMENDATIONS:      1. Stop zosyn ( lost IV access )    2. Flagyl  po 500 mg q 8 hrs ( hx of recurrent C diff infection )   3. Supportive care

## 2022-03-31 NOTE — PROGRESS NOTES
Contacted  to see if he would like another PICC line or central line to be inserted due to patient having no IV access after IV team attempt. Waiting for orders and response from .

## 2022-03-31 NOTE — FLOWSHEET NOTE
03/31/22 1615   Vital Signs   BP (!) 149/101   Temp 97.2 °F (36.2 °C)   Pulse 94   Resp 16   SpO2 98 %   Weight 138 lb 7.2 oz (62.8 kg)   Weight Method Estimated   Percent Weight Change -1.88   Pain Assessment   Pain Assessment 0-10   Pain Level 0   Post-Hemodialysis Assessment   Post-Treatment Procedures Blood returned;Catheter capped, clamped and heparinized x 2 ports   Machine Disinfection Process Acid/Vinegar Clean;Heat Disinfect; Exterior Machine Disinfection   Rinseback Volume (ml) 300 ml   Total Liters Processed (l/min) 63.2 l/min   Dialyzer Clearance Lightly streaked   Duration of Treatment (minutes) 210 minutes   Heparin amount administered during treatment (units) 0 units   Hemodialysis Intake (ml) 300 ml   Hemodialysis Output (ml) 1800 ml   NET Removed (ml) 1500 ml   Tolerated Treatment Good   Patient Response to Treatment pt tolerated well   Bilateral Breath Sounds Clear   Edema Generalized   Edema Generalized Trace   RLE Edema Trace   LLE Edema Trace   pt tolerated treatment well; pt stable; net removal 1.5L

## 2022-03-31 NOTE — PROGRESS NOTES
Department of Internal Medicine  Nephrology Progress Note        Events reviewed. Extubated 3/30. SUBJECTIVE:  We are following Miss Cheryle Coyne for ESKD on HD. Patient reports no complaints.     PHYSICAL EXAM:      Vitals:    VITALS:  /71   Pulse 79   Temp 97.5 °F (36.4 °C) (Axillary)   Resp 19   Ht 5' 4\" (1.626 m)   Wt 141 lb 0.1 oz (64 kg)   SpO2 97%   BMI 24.20 kg/m²   24HR INTAKE/OUTPUT:      Intake/Output Summary (Last 24 hours) at 3/31/2022 1811  Last data filed at 3/31/2022 0400  Gross per 24 hour   Intake 584.57 ml   Output --   Net 584.57 ml       Access: RIJ tunneled dialysis catheter  Constitutional: Awake, alert, NAD  HEENT:  PERRL, normocephalic, atraumatic  Respiratory:  CTA bilateral  Cardiovascular/Edema:  ST, RRR, no murmur gallop or rub  Gastrointestinal:  abd distended, c/o persistent abdominal pain  Neurologic: A&OX3 follows commands, no focal deficit  Skin:  Warm, dry, ecchymotic areas to abdomen    Other:    Scheduled Meds:   hydrocortisone sodium succinate PF  50 mg IntraVENous Q12H    calcium gluconate IVPB  1,000 mg IntraVENous Once    [Held by provider] insulin glargine-yfgn  3 Units SubCUTAneous QAM    insulin lispro  0-3 Units SubCUTAneous Q4H    melatonin  5 mg Oral Nightly    dilTIAZem  90 mg Oral Q6H    mirtazapine  15 mg Oral Nightly    escitalopram  10 mg Oral Daily    ARIPiprazole  5 mg Oral Nightly    rOPINIRole  0.25 mg Oral Nightly    sodium chloride flush  5-40 mL IntraVENous 2 times per day    piperacillin-tazobactam  3,375 mg IntraVENous Q12H    metroNIDAZOLE  500 mg IntraVENous Q8H    levothyroxine  88 mcg Oral Daily    famotidine  10 mg Oral Daily    epoetin nena-epbx  3,000 Units SubCUTAneous Once per day on Mon Wed Fri    Vitamin D  1,000 Units Per NG tube Daily     Continuous Infusions:   sodium chloride      dextrose       PRN Meds:.sodium chloride, white petrolatum, sodium chloride flush, polyethylene glycol, acetaminophen **OR** acetaminophen, glucose, dextrose, glucagon (rDNA), dextrose, albuterol, labetalol    DATA:    CBC:   Lab Results   Component Value Date    WBC 12.2 03/31/2022    RBC 2.58 03/31/2022    HGB 7.0 03/31/2022    HCT 21.3 03/31/2022    MCV 82.6 03/31/2022    MCH 26.4 03/31/2022    MCHC 31.9 03/31/2022    RDW 17.7 03/31/2022     03/31/2022    MPV 10.2 03/31/2022     CMP:    Lab Results   Component Value Date     03/31/2022    K 4.3 03/31/2022    K 3.7 03/04/2022    CL 99 03/31/2022    CO2 23 03/31/2022    BUN 25 03/31/2022    CREATININE 2.7 03/31/2022    GFRAA 25 03/31/2022    LABGLOM 25 03/31/2022    GLUCOSE 292 03/31/2022    GLUCOSE 130 05/18/2012    PROT 6.2 03/31/2022    LABALBU 2.4 03/31/2022    LABALBU 4.1 05/18/2012    CALCIUM 7.5 03/31/2022    BILITOT <0.2 03/31/2022    ALKPHOS 186 03/31/2022    AST 43 03/31/2022    ALT 26 03/31/2022     Magnesium:    Lab Results   Component Value Date    MG 2.0 03/31/2022     Phosphorus:    Lab Results   Component Value Date    PHOS 5.0 03/31/2022     Radiology Review:      CT Head WO Contrast March 27, 2022   No acute intracranial abnormality or hemorrhage.         CT Abdomen Pelvis WO Contrast March 27, 2022   1.  Moderate ascites throughout abdomen and pelvis.       2.  Multiple thick walled loops of small bowel throughout the abdomen could   suggest nonspecific infectious or inflammatory enteritis.       3.  Generalized body wall edema.       4.  Small bilateral pleural effusions with adjacent atelectatic changes.             Chest X-Ray March 28, 2022   Infiltrate and or atelectasis at the left lower lobe.  Substantially resolved   infiltrate and or atelectasis on the right.  New NG tube.               BRIEF SUMMARY OF INITIAL CONSULT:    Briefly, Miss Jacinda Jerome is a 36year-old female with a PMH of ESRD on hemodialysis 3 days/wk, on TTS schedule at [facility] I DM, poorly controlled with recurrent admissions for DKA, HTN, gastroparesis, ascites, infective endocarditis, cardiac arrest, hypothyroidism and depression who was admitted on March 27, 2022 after she was found unresponsive at the SNF where she resides. Patient was found to be profoundly hypoglycemic and EMS was called. She was intubated in ER for airway protection as she remained unresponsive. CT head showed no acute intracranial abnormality or hemorrhage, chest x-ray demonstrates right perihilar opacity concerning for aspiration pneumonia. She was ultimately admitted to MICU for further evaluation. Labs on admission were significant for sodium 135, potassium 5.0, chloride 100, bicarbonate 25, BUN 38, creatinine 4.9, proBNP >70,000. We are consulted for dialysis management. Problems resolved. · Hypoglycemia, was on D10, now hyperglycemic with +ketones (beta-hydroxybutyrate >4.5)  · Acute respiratory failure, 2/2 aspiration pneumonia? On 40% Fio2. Extubated 7/01  · Acute metabolic encephalopathy 2/2 hypoglycemia. Resolving     IMPRESSION/RECOMMENDATIONS:      1. ESRD 3 times a week TTS via RIJ tunneled dialysis catheter. HD initiated September 2021 after failed peritoneal dialysis with persistent hyperkalemia. For dialysis three times/wk TTS while inpatient. 2. Acidemia, pH 7.311, PCO2 35.0, HCO3 47.5, metabolic acidosis from DKA  3. Type I DM, poorly controlled with frequent readmissions for DKA, on SSI   4. MBD of CKD, restart sevelamer 800 mg po tid with meals, and monitor phosphorus levels  5. Anemia of CKD, Hgb 6.5 mg/dL on admission, continue epoetin alpha 3,000 unit 3 times/wk. S/p transfusion  6. HTN, on cardizem 90 mg po qid  7. Vitamin D deficiency, on ergocalciferol 1000 po daily  8. Adrenal insufficiency? Plan is for cosyntropin stim test. On stress does steroids  ------------------------------------------------------  9. Aspiration pneumonia? On piperacillin    10. Recent Covid 19 infection, unvaccinated  6. Restless leg syndrome, on ropinirole at home  12.  Depression, on escitalopram and Abilify  13. Hypothyroidism, on levothyroxine   14. History of gastroparesis, on metoclopramide at home. For EGD and pyloric dilatation with Botox injection outpatient per GI.  15. Hx recurrent C. difficile infection, on flagyl  16.  Hx of MDRO UTI  17. Nutrition, diabetic diet     Plan:     · Continue HD today and three times/wk, TTS while inpatient, for dialysis today  · Continue epoetin alpha 3,000 units 3 times/wk  · Continue to monitor labs daily   · Continue to monitor glucose levels  · Replace calcium, continue to monitor calcium levels  · Monitor phosphorus levels daily  · Monitor H&H, transfuse <7    Electronically signed by HEBER Dennis CNP on 3/31/2022 at 9:52 AM

## 2022-03-31 NOTE — PROGRESS NOTES
Physical Therapy    Physical Therapy Initial Assessment     Name: Efrain Barnett  : 1990  MRN: [id number]      Date of Service: 3/31/2022    Evaluating PT:  Mina Jennings PT, DPT  [id number]     Room #:  0357/2486-W  Diagnosis:  Altered mental status [V21.45]  Acute metabolic encephalopathy [N93.10]  PMHx/PSHx:  Acute CHF, cardiac arrest, DM, diabetic gastroparesis, diabetic ketoacidosis, diabetic polyneuropathy, ESRD on HD, HLD, MDRO, chronic Cdiff  Procedure/Surgery:  Intubated 3/27, extubated 3/30   Precautions:  Falls, Contact isolation  Equipment Needs:  NA    SUBJECTIVE:    Pt admitted from nursing facility. Ambulates with FWW vs. W/C and assistance from staff. Pt plans to return home with her mom in a 2 story home with 4 steps to enter and first floor set-up. OBJECTIVE:   Initial Evaluation  Date: 3/31/22 Treatment Short Term/ Long Term   Goals   AM-PAC 6 Clicks 36/17     Was pt agreeable to Eval/treatment? Yes      Does pt have pain?  7/10 HA     Bed Mobility  Rolling: SBA  Supine to sit: Min A  Sit to supine: NT  Scooting: SBA  Modified Independent     Transfers Sit to stand: Min A from bed, Max A from toilet  Stand to sit: Min A  Stand pivot: Min A with FWW   Modified Independent with FWW   Ambulation    20 feet x2 with FWW Min A    Limited by contact isolation room  >150 feet with FWW Modified Independent     Stair negotiation: ascended and descended  NT  >4 steps with 1 rail Modified Independent     ROM BUE:  Per OT eval  BLE:  WFL     Strength BUE:  Per OT eval  BLE:  WFL     Balance Sitting EOB:  SBA  Dynamic Standing:  Min A  Sitting EOB:  Independent   Dynamic Standing:  Modified Independent       Pt is A & O x 4  RASS:  0  CAM-ICU:  Nt  Sensation:  Pt denies numbness and tingling to extremities  Edema:  Unremarkable    Vitals:  Blood Pressure at rest 121/70 mmHg  Blood Pressure post session 134/89 mmHg   Heart Rate at rest 79 bpm  Heart Rate post session 83 bpm    SPO2 at rest 96% on RA SPO2 post session 98% on RA         Functional Status Score-Intensive Care Unit (FSS-ICU)   Rolling 5/7   Supine to sit transfer 4/7   Unsupported sitting  5/7   Sit to stand transfers 4/7   Ambulation 1/7   Total  19/35     Therapeutic Exercises:    BLE ROM  Multiple STS from various surfaces     Patient education  Pt educated on PT role, safety during functional mobility    Patient response to education:   Pt verbalized understanding Pt demonstrated skill Pt requires further education in this area   Yes  Yes  no     ASSESSMENT:    Conditions Requiring Skilled Therapeutic Intervention:    [x]Decreased strength     []Decreased ROM  [x]Decreased functional mobility  [x]Decreased balance   [x]Decreased endurance   []Decreased posture  []Decreased sensation  []Decreased coordination   []Decreased vision  []Decreased safety awareness   []Increased pain       Comments:  Pt received supine and agreeable to PT evaluation with OT collaboration. Pt cleared for participation by RN prior to session. Vitals monitored during session. Requires assistance to stand and ambulate short distance to chair using FWW. Pt incontinent of bowels. Able to stand and ambulate to bathroom. Environmental cleanup performed. Pt required x3 attempts and increased assistance to stand from low toilet using grab bar. Pt ambulated back to bedside chair. Encouraged to sit up in chair OOB as tolerated. Pt left with call button in reach, lines attached, and needs met. Discussed with RN.   Discussed pt case at interdisciplinary rounds in AM.     Treatment:  Patient practiced and was instructed in the following treatment:     Bed mobility training - pt given verbal and tactile cues to facilitate proper sequencing and safety during rolling and supine>sit as well as provided with physical assistance to complete task    STS and pivot transfer training - pt educated on proper hand and foot placement, safety and sequencing, and use of FWW to safely complete sit<>stand and pivot transfers with hands on assistance to complete task safely    Gait training- pt was given verbal and tactile cues to facilitate safety/balance during ambulation as well as provided with physical assistance. Pt's/ family goals   1. Home     Prognosis is good for reaching above PT goals. Patient and or family understand(s) diagnosis, prognosis, and plan of care. yes    PHYSICAL THERAPY PLAN OF CARE:    PT POC is established based on physician order and patient diagnosis     Referring provider/PT Order:    03/30/22 1315  PT eval and treat  Start:  03/30/22 1315,   End:  03/30/22 1315,   ONE TIME,   Standing Count:  1 Occurrences,   R         Alexandria Partida MD       Diagnosis:  Altered mental status [S51.46]  Acute metabolic encephalopathy [P13.05]  Specific instructions for next treatment:  Progress gait, trial stairs as able     Current Treatment Recommendations:     [x] Strengthening to improve independence with functional mobility   [] ROM to improve independence with functional mobility   [x] Balance Training to improve static/dynamic balance and to reduce fall risk  [x] Endurance Training to improve activity tolerance during functional mobility   [x] Transfer Training to improve safety and independence with all functional transfers   [x] Gait Training to improve gait mechanics, endurance and asses need for appropriate assistive device  [x] Stair Training in preparation for safe discharge home and/or into the community   [x] Positioning to prevent skin breakdown and contractures  [x] Safety and Education Training   [x] Patient/Caregiver Education   [x] HEP  [] Other     PT long term treatment goals are located in above grid    Frequency of treatments: 2-5x/week x 1-2 weeks.     Time in  0855  Time out  0935    Total Treatment Time  25 minutes     Evaluation Time includes thorough review of current medical information, gathering information on past medical history/social history and prior level of function, completion of standardized testing/informal observation of tasks, assessment of data and education on plan of care and goals.     CPT codes:  [] Low Complexity PT evaluation 55128  [x] Moderate Complexity PT evaluation 51848  [] High Complexity PT evaluation 27620  [] PT Re-evaluation 06435  [] Gait training 55711 -- minutes  [] Manual therapy 86991 -- minutes  [x] Therapeutic activities 18695 25 minutes  [] Therapeutic exercises 55236 - minutes  [] Neuromuscular reeducation 55080 -- minutes     [facility], PT, DPT  [id number]

## 2022-03-31 NOTE — PROGRESS NOTES
[facility]  Internal Medicine Residency Program  Progress Note - House Team     Patient:  Gordon Matias 36 y.o. female MRN: [id number]     Date of Service: 3/31/2022     CC: AMS  Overnight events: NAEO    Subjective     Patient was seen and examined this morning at bedside in no acute distress. Overnight no acute events documented. VSS stable, afebrile. Patient was extubated yesterday and is currently on nasal cannula. She is in no respiratory distress. Patient complains of loose watery diarrhea over the past 24 hours. No other acute complaints this time. Objective     Physical Exam:  · Vitals: /78   Pulse 70   Temp 97.3 °F (36.3 °C) (Axillary)   Resp 13   Ht 5' 4\" (1.626 m)   Wt 141 lb 0.1 oz (64 kg)   SpO2 97%   BMI 24.20 kg/m²     · I & O - 24hr: No intake/output data recorded. · General Appearance: Sedated, intubated  · HEENT:  Head: Normocephalic, no lesions, without obvious abnormality. · Neck: no adenopathy, no carotid bruit, no JVD, supple, symmetrical, trachea midline and thyroid not enlarged, symmetric, no tenderness/mass/nodules  · Lung: clear to auscultation bilaterally  · Heart: regular rate and rhythm, S1, S2 normal, no murmur, click, rub or gallop  · Abdomen: soft, non-tender; bowel sounds normal; no masses,  no organomegaly  · Extremities:  extremities normal, atraumatic, no cyanosis or edema  · Musculokeletal: No joint swelling, no muscle tenderness. ROM normal in all joints of extremities. · Neurologic: Mental status: Sedated; grossly nonfocal, brainstem reflexes intact.   Subject  Pertinent Labs & Imaging Studies   jorge alberto  CBC with Differential:    Lab Results   Component Value Date    WBC 12.2 03/31/2022    RBC 2.58 03/31/2022    HGB 7.0 03/31/2022    HCT 21.3 03/31/2022     03/31/2022    MCV 82.6 03/31/2022    MCH 26.4 03/31/2022    MCHC 31.9 03/31/2022    RDW 17.7 03/31/2022    NRBC 0.9 03/28/2022    SEGSPCT 67 06/27/2013    METASPCT 1.7

## 2022-03-31 NOTE — PROGRESS NOTES
[address] [facility]   Department of Internal Medicine   Internal Medicine Residency  MICU Progress Note    Patient:  Antonio Retana 36 y.o. female   MRN: [id number]       Date of Service: 3/31/2022    Allergy: Cefepime and Toradol [ketorolac tromethamine]    Subjective:     Critical Care Daily Progress Note: 3/31/2022 2:54 PM    Patient was seen and examined this morning at beside  S/p intubations   lethargic but arousable    vitals stable   Labile blood sugar    24 hour change: stable     Objective     Vitals reviewed. I/O last 3 completed shifts: In: 1027 [I.V.:161.1; IV Piggyback:865.9]  Out: -   No intake/output data recorded. TEMPERATURE:  Current - Temp: 97.7 °F (36.5 °C); Max - Temp  Av.2 °F (36.2 °C)  Min: 95.8 °F (35.4 °C)  Max: 97.7 °F (36.5 °C)  RESPIRATIONS RANGE: Resp  Av.9  Min: 6  Max: 21  PULSE RANGE: Pulse  Av.1  Min: 70  Max: 92  BLOOD PRESSURE RANGE:  Systolic (13RDO), YLD:998 , Min:100 , BRZ:960   ; Diastolic (54DSD), YYJ:90, Min:69, Max:103    PULSE OXIMETRY RANGE: SpO2  Av.8 %  Min: 95 %  Max: 100 %    I & O - 24hr:    Intake/Output Summary (Last 24 hours) at 3/31/2022 1454  Last data filed at 3/31/2022 0400  Gross per 24 hour   Intake 584.57 ml   Output --   Net 584.57 ml     I/O last 3 completed shifts: In: 1027 [I.V.:161.1; IV Piggyback:865.9]  Out: -  No intake/output data recorded. Weight change: -8 lb 11 oz (-3.94 kg)    Physical Exam:  General Appearance:   follow simple command waking up   Extubated to 5 NC   HEENT:    NC/AT, mucous membranes are moist.   Neck:   Supple, no jugular venous distention. Resp:     CTAB, No wheezes, No rhonchi, no use of accessory muscles   Heart:    RRR, S1 and S2 normal, no murmur, rub or gallop.     Abdomen:     Soft, non-tender, non-distended with normal bowel sounds   Extremities:   Atraumatic, no cyanosis or edema   Pulses:  Radial and pedal pulses are intact bilaterally   Neurologic:  Follow simple command, somnolent but arousable         Medications:     Continuous Infusions:   sodium chloride      sodium chloride      dextrose       Scheduled Meds:   hydrocortisone sodium succinate PF  50 mg IntraVENous Q12H    calcium gluconate IVPB  1,000 mg IntraVENous Once    sevelamer  800 mg Oral TID WC    metroNIDAZOLE  500 mg Oral 3 times per day    lidocaine  5 mL IntraDERmal Once    sodium chloride flush  5-40 mL IntraVENous 2 times per day    heparin flush  1 mL IntraVENous 2 times per day    insulin glargine  1 Units SubCUTAneous Nightly    insulin lispro  0-3 Units SubCUTAneous Q4H    melatonin  5 mg Oral Nightly    dilTIAZem  90 mg Oral Q6H    mirtazapine  15 mg Oral Nightly    escitalopram  10 mg Oral Daily    ARIPiprazole  5 mg Oral Nightly    rOPINIRole  0.25 mg Oral Nightly    levothyroxine  88 mcg Oral Daily    famotidine  10 mg Oral Daily    epoetin nena-epbx  3,000 Units SubCUTAneous Once per day on Mon Wed Fri    Vitamin D  1,000 Units Per NG tube Daily     PRN Meds: sodium chloride, sodium chloride flush, sodium chloride, heparin flush, white petrolatum, polyethylene glycol, acetaminophen **OR** acetaminophen, glucose, dextrose, glucagon (rDNA), dextrose, albuterol, labetalol  Nutrition:   NG/OG tube TF type: Pulmocare/Nephro/Glucerna/Jevity           Labs and Imaging Studies:     CBC:   Recent Labs     03/30/22  0505 03/30/22  0505 03/31/22  0403 03/31/22  0523 03/31/22  0949   WBC 12.2*  --  12.2*  --  12.8*   HGB 7.6*   < > 6.8* 7.0* 7.7*   HCT 22.9*   < > 21.3* 21.3* 24.7*  23.8*   MCV 80.1  --  82.6  --  84.0     --  210  --  280    < > = values in this interval not displayed.        BMP:    Recent Labs     03/30/22  0505 03/30/22  1720 03/31/22  0403    135 133   K 4.8 4.6 4.3   CL 98 100 99   CO2 26 23 23   BUN 15 21* 25*   CREATININE 1.9* 2.4* 2.7*   GLUCOSE 148* 247* 292*       LIVER PROFILE:   Recent Labs     03/29/22  0418 03/30/22  0505 03/31/22  0403   AST 21 17 43*   ALT 21 18 26   BILIDIR <0.2 <0.2 <0.2   BILITOT 0.3 0.3 <0.2   ALKPHOS 206* 183* 186*       PT/INR:   Recent Labs     03/31/22  0949   PROTIME 12.1   INR 1.1       APTT:   Recent Labs     03/31/22  0949   APTT 33.3       Fasting Lipid Panel:    Lab Results   Component Value Date    CHOL 95 01/14/2020    TRIG 82 01/14/2020    HDL 33 01/14/2020       Cardiac Enzymes:    Lab Results   Component Value Date    CKTOTAL 22 11/22/2021    CKTOTAL 120 02/15/2021    CKTOTAL 99 11/04/2020    CKMB 2.1 10/31/2020    CKMB <1.0 03/24/2015    CKMB <1.0 03/23/2015    TROPONINI 0.12 (H) 05/14/2021    TROPONINI 0.22 (H) 02/15/2021    TROPONINI 0.13 (H) 12/26/2020       Notable Cultures:      Blood cultures   Blood Culture, Routine   Date Value Ref Range Status   03/28/2022 24 Hours no growth  Preliminary     Respiratory cultures No results found for: RESPCULTURE   Gram Stain Result   Date Value Ref Range Status   12/08/2021 Refer to ordered Gram stain for results  Final     Urine   Urine Culture, Routine   Date Value Ref Range Status   03/27/2022 Growth not present  Final     Legionella No results found for: LABLEGI  C Diff PCR No results found for: CDIFPCR  Wound culture/abscess: No results for input(s): WNDABS in the last 72 hours. Tip culture:No results for input(s): CXCATHTIP in the last 72 hours.      Antibiotic  Days  Day started                                Oxygen:     Vent Information  $Ventilation: Off Vent  Skin Assessment: Clean, dry, & intact  Equipment ID: [id number]  Vent Type: [phone]  Vent Mode: (S) PS  Vt Ordered: 0 mL  Rate Set: 0 bmp  Peak Flow: 0 L/min  Pressure Support: 8 cmH20  FiO2 : 0 %  SpO2: 96 %  SpO2/FiO2 ratio: 250  Sensitivity: 3  PEEP/CPAP: 5  I Time/ I Time %: 0 s  Humidification Source: Heated wire  Humidification Temp: 37  Humidification Temp Measured: 37  Circuit Condensation: Drained  Additional Respiratory  Assessments  Pulse: 84  Resp: 10  SpO2: 96 %  End Tidal CO2: 29 (%)  Humidification Source: Heated wire  Humidification Temp: 37  Circuit Condensation: Drained  Oral Care: Teeth brushed  Subglottic Suction Done?: Yes  Airway Type: ET  Airway Size: 7.5  Cuff Pressure (cm H2O): 29 cm H2O     Nasal cannula L/min     Face mask %     Reservoirs mask %       ABG   Vent Settings     PH   Mode      PCO2   TV      PO2   RR      HCO3   PS      Sat%   PEEP      FIO2   FIO2        P/F          Lines:  Site  Day  Date inserted     TLC              PICC              Arterial line              Peripheral line              HD cath            [REMOVED] Urethral Catheter Straight-tip 16 fr-Output (mL): 5 mL  [REMOVED] External Urinary Catheter-Output (mL): 5 mL      Brief Summary: The patient is a 36 y.o. female with PMH of uncontrolled T1DM (Hgb A1c 7.7%), ESRD on HD, hypothyroidism, prior endocarditis w/ septic emboli, anemia of chronic disease, prior MDRO UTI, and MRSA infection, who was brought in to the ED for acute encephalopathy. She was found to be lethargic and encephalopathic by employees of her SNF. POCT BG was collected, which read as too low to quantify on the meter. EMS was called, and the pt was transported to [facility] ED. On arrival to the ED, the pt was obtunded and unable to answer questions/follow commands. D/t concern for respiratory compromise and inability to protect her airway, she was urgently intubated for airway protection. Others work up in the ED, Hb 6.5, BNP 70K, troponin 206, CT shows small bowel wall thickening. Started on Vanc and zoysen and started in 100 cc/h. For further care and management she was transferred to MICU    Assessment:     Active problems   1. Acute Metabolic Encephalopathy 2/2 hypoglycemia vs stroke vs seizure, resolved   2. Acute respiratory failure secondary to altered mental status. Concern for Aspiration pneumonia versus pneumonitis, on Zosyn. S/P intubation  3. Brittle DMT1, labile blood sugar   4. DKA, transient  5. Extremely sensitive to insulin  6.  Elevated Troponin 2/2 end-stage renal disease  7. Elevated proBNP secondary to ESRD disease  8. Prolong QTc   9. Hx Chronic c. Diff, on metronidazole per ID  10. Hx CKD 2/2 to DM. on dialysis TTS  11. Acute on chronic Anemia of Chronic Disease, s/p 1 unit of blood. Stable     Inactive problems:  12. Hx Septic Emboli  13. Hx of anxiety and depression   14. Hx Vitamin D deficiency  15. Hx of hypertension  16. Hx of COVID-19 pneumonia  17. Hx of MDRO UTI  18. Hx of Gastroparesis   19. Hx of hypothyroidism    20. Hx of restless leg syndrome     Plan:   · Start 1 unit of lantus and continue modified SSI    · Monitor vitals and blood sugar Q 4H  · If BG running low, start on D5 half normal saline   · Continue to wean hydrocortisone   · Monitor H and H q12, holding anticoagulation   · Greatly appreciate Endo, ID and Nephro recommendations   · Follow tip culture  ·  Midline for access   · Transfer out     # Code Status: Full   # Peptic ulcer prophylaxis: Pepcid   # DVT Prophylaxis: PCD  # Disposition: [facility] Stain out     Mercedes Huff MD, PGY-2  Internal medicine resident  Attending Physician: Dr. Jorge Bravo       I personally saw, examined and provided care for the patient. Radiographs, labs and medication list were reviewed by me independently. I spoke with bedside nursing, therapists and consultants. Critical care services and times documented are independent of procedures and multidisciplinary rounds with Residents. Additionally comprehensive, multidisciplinary rounds were conducted with the MICU team. The case was discussed in detail and plans for care were established. Review of Residents documentation was conducted and revisions were made as appropriate. I agree with the above documented exam, problem list and plan of care.   Antonella Hale MD   CCT excluding procedures:33'

## 2022-03-31 NOTE — PROGRESS NOTES
Single lumen chg midline Placement 3/31/2022    Product number: [id number]   Lot Number: [id number]      Ultrasound: yes   Right Brachial vein:                Upper Arm Circumference: 27cm    Size: 15cm    Exposed Length: 5cm    Internal Length: 10cm   Cut: 0   Vein Measurement: 0.54cm    Adelfo Jones RN  3/31/2022  5:13 PM

## 2022-03-31 NOTE — PROGRESS NOTES
Comprehensive Nutrition Assessment    Type and Reason for Visit:  Initial (ICU LOS)    Nutrition Recommendations/Plan: Continue current diet and start ONS to promote adequate oral intake; will monitor. Nutrition Assessment:  pt adm after being found unresponsive at nursing home; pt w/ acute encephalopathy & AMS; pt extubated 3/30; pt w/ C-DIFF & ESRD (on HD); hx of DM, Ketoacidosis 2/2 DM, drug abuse, non-compliance w/ meds, cardiac arrest, severe-pro nina malnutr; since being extubated, pt intake appears good w/ an intake of % recorded; will start ONS to ensure adequtate oral intake and monitor. Malnutrition Assessment:  Malnutrition Status: At risk for malnutrition (Comment)    Context:  Chronic Illness     Findings of the 6 clinical characteristics of malnutrition:  Energy Intake:  Mild decrease in energy intake (Comment)  Weight Loss:  Unable to assess (d/t wt flucc 2/2 ESRD + HD; measured wt hx ranging from 129-169# within the past yr)     Body Fat Loss:  Unable to assess     Muscle Mass Loss:  Unable to assess    Fluid Accumulation:  No significant fluid accumulation     Strength:  Not Performed    Estimated Daily Nutrient Needs:  Energy (kcal):  9308-1079; Weight Used for Energy Requirements:  Current     Protein (g):  1.5-1.8g/kgxCBW=95-115g; Weight Used for Protein Requirements:  Current (HD)        Fluid (ml/day):  per renal management; Method Used for Fluid Requirements:  Standard Renal      Nutrition Related Findings:  A&Ox4; active BS; trace edema; IFT=338; extubated 3/30; elevated glucose 3/31 (292)      Wounds:  None       Current Nutrition Therapies:    ADULT DIET;  Regular; 4 carb choices (60 gm/meal)  ADULT ORAL NUTRITION SUPPLEMENT; Lunch, Breakfast; Renal Oral Supplement    Anthropometric Measures:  · Height: 5' 4\" (162.6 cm)  · Current Body Weight: 141 lb (64 kg) (3/31-BS)   · Admission Body Weight: 138 lb 3.7 oz (62.7 kg) (3/28-BS)    · Usual Body Weight:  (note w/in 1yr wts range from 129#-169# possibly d/t fluid shifts 2/2 ESRD + HD)     · Ideal Body Weight: 120 lbs; % Ideal Body Weight 117.5 %   · BMI: 24.2  · Adjusted Body Weight:  ; No Adjustment   · BMI Categories: Normal Weight (BMI 18.5-24. 9)       Nutrition Diagnosis:   · Increased nutrient needs related to increase demand for energy/nutrients as evidenced by dialysis      Nutrition Interventions:   Food and/or Nutrient Delivery:  Continue Current Diet,Start Oral Nutrition Supplement (Renal BID)  Nutrition Education/Counseling:  Education not indicated   Coordination of Nutrition Care:  Continue to monitor while inpatient    Goals:  Consumes >75% meals and ~50% ONS. Nutrition Monitoring and Evaluation:   Behavioral-Environmental Outcomes:  None Identified   Food/Nutrient Intake Outcomes:  Food and Nutrient Intake,Supplement Intake  Physical Signs/Symptoms Outcomes:  Biochemical Data,Nutrition Focused Physical Findings,Skin,Weight,Chewing or Swallowing,GI Status,Fluid Status or Edema     Discharge Planning:     Too soon to determine     Electronically signed by Ciara Osorio RD on 3/31/22 at 12:38 PM EDT    Contact: [phone]

## 2022-03-31 NOTE — PLAN OF CARE
Problem: Skin Integrity:  Goal: Will show no infection signs and symptoms  Description: Will show no infection signs and symptoms  3/31/2022 1133 by Arnulfo Izaguirre RN  Outcome: Met This Shift  3/31/2022 0225 by Jaden Díaz RN  Outcome: Met This Shift  Goal: Absence of new skin breakdown  Description: Absence of new skin breakdown  3/31/2022 1133 by Arnulfo Izaguirre RN  Outcome: Met This Shift  3/31/2022 0225 by Jaden Díaz RN  Outcome: Met This Shift     Problem: Serum Glucose Level - Abnormal:  Goal: Ability to maintain appropriate glucose levels has stabilized  Description: Ability to maintain appropriate glucose levels has stabilized  Outcome: Met This Shift     Problem: Breathing Pattern - Ineffective:  Goal: Ability to achieve and maintain a regular respiratory rate will improve  Description: Ability to achieve and maintain a regular respiratory rate will improve  Outcome: Met This Shift     Problem: Cardiac Output - Decreased:  Goal: Hemodynamic stability will improve  Description: Hemodynamic stability will improve  Outcome: Met This Shift     Problem: Diarrhea:  Goal: Bowel elimination is within specified parameters  Description: Bowel elimination is within specified parameters  3/31/2022 1133 by Arnulfo Izaguirre RN  Outcome: Not Met This Shift  3/31/2022 0225 by Jaden Díaz RN  Outcome: Met This Shift  Goal: Passage of soft, formed stool  Description: Passage of soft, formed stool  3/31/2022 1133 by Arnulfo Izaguirre RN  Outcome: Not Met This Shift  3/31/2022 0225 by Jaden Díaz RN  Outcome: Met This Shift  Goal: Establishment of normal bowel function will improve to within specified parameters  Description: Establishment of normal bowel function will improve to within specified parameters  3/31/2022 1133 by Arnulfo Izaguirre RN  Outcome: Not Met This Shift  3/31/2022 0225 by Jaden Díaz RN  Outcome: Met This Shift

## 2022-03-31 NOTE — PROGRESS NOTES
[address]       Date:3/31/2022                                                               Patient Name: Andi Schwartz  MRN: [id number]  : 1990  Room: [address] [place] [address]    Evaluating OT: ELOINA Hernandez,  OTR/L; [id number]    Referring Provider: Clemente De Jesus MD   Specific Provider Orders/Date: OT eval and treat (3/30/22)       Diagnosis: Altered mental status [W52.62]  Acute metabolic encephalopathy [O14.22]     Reason for admission: Pt admitted with AMS, hypoglycemia from SNF. Surgery/Procedures: None this admission.      Pertinent Medical History:    Past Medical History:   Diagnosis Date    Acute congestive heart failure ([address]     BC (acute kidney injury) ([address] 10/01/2019    Cardiac arrest ([address] 02/15/2021    Cephalgia 10/09/2019    Chronic kidney disease     Depression     Diabetes mellitus ([address]     Diabetic gastroparesis associated with type 1 diabetes mellitus ([address] 2018    Diabetic ketoacidosis ([address] 2011    Diabetic ketoacidosis with coma associated with type 1 diabetes mellitus ([address] 2013    Diabetic polyneuropathy associated with type 1 diabetes mellitus ([address] 2020    Drug use complicating pregnancy in third trimester     Endocarditis 10/31/2020    ESRD (end stage renal disease) ([address] 2020    H/O cardiovascular stress test 2021    Lexiscan    Hemodialysis patient [facility])     History of blood transfusion 2019    Hyperlipidemia 10/08/2020    Hyperosmolar hyperglycemic state (HHS) ([address] 2020    Hypothyroidism 10/08/2020    Iron deficiency anemia 10/01/2019    MDRO (multiple drug resistant organisms) resistance     MRSA (methicillin resistant Staphylococcus aureus)     back wound abcess    Non compliance w medication regimen 2016    Other disorders of kidney and ureter     Pregnancy 2016    16 weeks    Previous  delivery affecting pregnancy, antepartum 2017    Previous stillbirth or demise, antepartum 2016    Seizure ([facility] 2020    Severe pre-eclampsia in third trimester 2016    Shock liver 02/15/2021    Valvular endocarditis 11/10/2020    This Diagnosis was added to the Problem List based on transcribed orders from Dr. Thierry Flower           *Precautions:  Fall Risk, contact precautions    Assessment of current deficits   [x] Functional mobility  [x]ADLs  [x] Strength               [x]Cognition   [x] Functional transfers   [x] IADLs         [x] Safety Awareness   [x]Endurance   [] Fine Coordination        [] ROM     [] Vision/perception   []Sensation    []Gross Motor Coordination [x] Balance   [] Delirium                  []Motor Control     [] Communication    OT PLAN OF CARE   OT POC based on physician orders, patient diagnosis and results of clinical assessment.        Frequency/Duration: 1-3 days/wk for 1-2 weeks PRN    Specific OT Treatment Interventions to include:   * Instruction/training on adapted ADL techniques and AE recommendations to increase functional independence within precautions       * Training on energy conservation strategies, correct breathing pattern and techniques to improve independence/tolerance for self-care routine  * Functional transfer/mobility training/DME recommendations for increased independence, safety, and fall prevention  * Patient/Family education to increase follow through with safety techniques and functional independence  * Recommendation of environmental modifications for increased safety with functional transfers/mobility and ADLs  * Cognitive retraining/development of therapeutic activities to improve problem solving, judgement, memory, and attention for increased safety/participation in ADL/IADL tasks  * Sensory re-education to improve body/limb awareness, maintain/improve skin integrity, and improve hand/UE motor function  * Visual-perceptual training to improve environmental scanning, visual attention/focus, and oculomotor skills for increased safety/independence with functional transfers/mobility and ADLs  * Splinting/positioning for increased function, prevention of contractures, and improve skin integrity  * Therapeutic exercise to improve motor endurance, ROM, and functional strength for ADLs/functional transfers  * Therapeutic activities to facilitate/challenge dynamic balance, stand tolerance for increased safety and independence with ADLs  * Therapeutic activities to facilitate gross/fine motor skills for increased independence with ADLs  * Neuro-muscular re-education: facilitation of righting/equilibrium reactions, midline orientation, scapular stability/mobility, normalization of muscle tone, and facilitation of volitional active controled movement  * Positioning to improve skin integrity, interaction with environment and functional independence  * Delirium prevention/treatment  * Manual techniques for edema management      Recommended Adaptive Equipment: shower chair, TBD     Home Living: Pt lives Mother and 2 sisters  in a 1 story home with 4 step(s) to enter and no rail(s); bed/bath on main floor. Bathroom setup: tub/shower  Equipment owned: wc, walker. Prior Level of Function: Per patient received assist with ADLs; Assist with IADLs. Walker/wc for ambulation. Driving: No  Occupation: None    Pain Level: pt c/o 7/10 pain this session, however unable to state location. Cognition: A&O: 3/4  Not oriented to day; Follows 1 step commands with mod verbal cues. Pt disoriented this session requiring increased time to complete tasks and min verbal/tactile cues for task completion. Memory: F+   Comprehension: G-   Problem solving: F+   Judgement/safety: F               Communication skills: Pt with limited verbalizations this session, primarily shaking head yes/no or providing short answers. Vision: [facility]               Glasses: Yes                                                   Hearing: WFL     RASS: 0  CAM-ICU: (NT) Delirium    UE Assessment:  Hand Dominance: Right [x]  Left []     ROM Strength   RUE  WFL Grossly 3+/5   LUE WFL Grossly 3+/5     Sensation: No c/o numbness/tingling in BUE extremities. Tone: WNL  Edema: Unremarkable. Functional Assessment:  AM-PAC Daily Activity Raw Score: 16/24   Initial Eval Status  Date: 3/31/22 Treatment Status  Date: STGs = LTGs  Time frame: 7-14 days   Feeding Sup  Increased time and effort to complete task. Ind.       Grooming Min A  While standing at sink to wash hands. Ind.        UB dressing/bathing Min A  While seated at EOB. Max verbal cues for task completion d/t fatigue. Ind.       LB dressing/bathing Mod A overall  Min A  While seated on lowered surface to don/doff socks. Increased time and effort to complete with mod verbal cues. Ind.        Toileting Max A  Max A for STS from commode, Mod A for thoroughness of hygiene. Pt incontinent of bowel during STS transfers. SBA     Bed Mobility  Supine to sit:   Min A    Sit to supine:   Min A                      Ind.     Functional Transfers Sit to stand:   Min A    Stand to sit:   Min A    Commode: Max A with use of grab bars. STS: Ind. Commode: SBA   Functional Mobility Min A with FWW  For short household distances  Bed<>bathroom. Ind. Balance Sitting:     Static: CGA    Dynamic: Min A  Standing: Min A   Sitting:     Static: Ind. Dynamic: Ind.  Standing: Ind. Endurance/Activity Tolerance   poor tolerance with light activity. [facility]   Visual/  Perceptual Impaired: pt reporting increased blurriness.     Glasses: yes                     Vitals:   HR at rest: 79 bpm HR at end of session: 83 bpm   Spo2 at rest:98% Spo2 at end of session 98%   BP at rest:121/70 mmHg BP at end of session 134/89 mmHg       Treatment: OT treatment provided this date includes:      Instruction/training on safety and adapted techniques for completion of ADLs: to increase independence, safety, and alertness during self-care.   Instruction/training on safe bed mobility/functional mobility/transfer techniques: with focus on safety, body mechanics, and precautions    Instruction/training on energy conservation/work simplification for completion of ADLs: techniques to increase independence with self-care ADLs and IADLs, work simplification to improve endurance.  Skilled Monitoring of Vitals:  to include BP, spO2, and HR throughout session to maximize safety.  Sitting Balance/Tolerance: pt positioned up in chair to increase balance and activity tolerance during ADLs and facilitate proper posture and positioning.  Delirium Prevention: Environmental and sensory modifications assessed and implemented to decrease ICU acquired delirium and to improve overall orientation, mentation and pt interaction with family/staff. Line management and environmental modifications made prior to and end of session to ensure patient safety and to increase efficiency of session. Skilled monitoring of HR, O2 saturation, blood pressure and patient's response to activity performed throughout session. Comments: OK from RN to see patient. Upon arrival, patient supine in bed. Pt demo poor tolerance with fair understanding of education/techniques. At end of session, patient seated in chair for breakfast. Call light within reach, all lines and tubes intact. Pt instructed on use of call light for assistance and fall prevention. Nursing notified of patient positioning. Patient presents with decreased ROM/strength, activity tolerance, dynamic balance, functional mobility limiting completion of ADLs and safety.   Pt can benefit from continued skilled OT to increase safety, functional independence and quality of life. Rehab Potential: Good for established goals    Patient / Family Goal: to return to PLOF    Patient and/or family were instructed/educated on diagnosis, prognosis/goals and plan of care. Patient demonstrated good understanding. Evaluation Complexity: Low    [] Malnutrition indicators have been identified and nursing has been notified to ensure a dietitian consult is ordered. Time In:8:53 AM             Time Out: 9:35 AM         Total Treatment time: 27 minutes   Min Units   OT Eval Low 97165 x 1   OT Eval Medium 91941     OT Eval High 86284     OT Re-Eval [id number]     Therapeutic Ex 68844     Therapeutic Activities 75156     ADL/Self Care [id number] 27  2   Orthotic Management 33757     Neuro Re-Ed 46616     Non-Billable Time        Evaluation time includes thorough review of current medical information, gathering information on past medical history/social history and prior level of function, completion of standardized testing/informal observation of tasks, assessment of data and development of POC/Goals.      ELOINA Akins,  OTR/L; [id number]

## 2022-03-31 NOTE — PLAN OF CARE
Problem: Skin Integrity:  Goal: Will show no infection signs and symptoms  Description: Will show no infection signs and symptoms  Outcome: Met This Shift  Goal: Absence of new skin breakdown  Description: Absence of new skin breakdown  Outcome: Met This Shift     Problem: Diarrhea:  Goal: Bowel elimination is within specified parameters  Description: Bowel elimination is within specified parameters  Outcome: Met This Shift  Goal: Passage of soft, formed stool  Description: Passage of soft, formed stool  Outcome: Met This Shift  Goal: Establishment of normal bowel function will improve to within specified parameters  Description: Establishment of normal bowel function will improve to within specified parameters  Outcome: Met This Shift

## 2022-04-01 LAB
ALBUMIN SERPL-MCNC: 2.4 G/DL (ref 3.5–5.2)
ALDOSTERONE: 3.2 NG/DL
ALP BLD-CCNC: 173 U/L (ref 35–104)
ALT SERPL-CCNC: 26 U/L (ref 0–32)
ANION GAP SERPL CALCULATED.3IONS-SCNC: 13 MMOL/L (ref 7–16)
ANION GAP SERPL CALCULATED.3IONS-SCNC: 8 MMOL/L (ref 7–16)
ANISOCYTOSIS: ABNORMAL
AST SERPL-CCNC: 29 U/L (ref 0–31)
BASOPHILS ABSOLUTE: 0 E9/L (ref 0–0.2)
BASOPHILS RELATIVE PERCENT: 0 % (ref 0–2)
BILIRUB SERPL-MCNC: 0.2 MG/DL (ref 0–1.2)
BILIRUBIN DIRECT: <0.2 MG/DL (ref 0–0.3)
BILIRUBIN, INDIRECT: ABNORMAL MG/DL (ref 0–1)
BLOOD CULTURE, ROUTINE: NORMAL
BUN BLDV-MCNC: 13 MG/DL (ref 6–20)
BUN BLDV-MCNC: 19 MG/DL (ref 6–20)
C-PEPTIDE: <0.1 NG/ML (ref 0.5–3.3)
CALCIUM IONIZED: 1.16 MMOL/L (ref 1.15–1.33)
CALCIUM SERPL-MCNC: 7.8 MG/DL (ref 8.6–10.2)
CALCIUM SERPL-MCNC: 8.2 MG/DL (ref 8.6–10.2)
CHLORIDE BLD-SCNC: 101 MMOL/L (ref 98–107)
CHLORIDE BLD-SCNC: 97 MMOL/L (ref 98–107)
CO2: 23 MMOL/L (ref 22–29)
CO2: 27 MMOL/L (ref 22–29)
CREAT SERPL-MCNC: 1.9 MG/DL (ref 0.5–1)
CREAT SERPL-MCNC: 2.4 MG/DL (ref 0.5–1)
CULTURE, BLOOD 2: NORMAL
EOSINOPHILS ABSOLUTE: 0 E9/L (ref 0.05–0.5)
EOSINOPHILS RELATIVE PERCENT: 0 % (ref 0–6)
GFR AFRICAN AMERICAN: 29
GFR AFRICAN AMERICAN: 38
GFR NON-AFRICAN AMERICAN: 29 ML/MIN/1.73
GFR NON-AFRICAN AMERICAN: 38 ML/MIN/1.73
GLUCOSE BLD-MCNC: 172 MG/DL (ref 74–99)
GLUCOSE BLD-MCNC: 315 MG/DL (ref 74–99)
HCT VFR BLD CALC: 22.1 % (ref 34–48)
HEMOGLOBIN: 7 G/DL (ref 11.5–15.5)
HYPOCHROMIA: ABNORMAL
LYMPHOCYTES ABSOLUTE: 0 E9/L (ref 1.5–4)
LYMPHOCYTES RELATIVE PERCENT: 4.9 % (ref 20–42)
MAGNESIUM: 1.7 MG/DL (ref 1.6–2.6)
MCH RBC QN AUTO: 25.5 PG (ref 26–35)
MCHC RBC AUTO-ENTMCNC: 31.7 % (ref 32–34.5)
MCV RBC AUTO: 80.7 FL (ref 80–99.9)
METER GLUCOSE: 162 MG/DL (ref 74–99)
METER GLUCOSE: 230 MG/DL (ref 74–99)
METER GLUCOSE: 282 MG/DL (ref 74–99)
METER GLUCOSE: 284 MG/DL (ref 74–99)
METER GLUCOSE: 302 MG/DL (ref 74–99)
MONOCYTES ABSOLUTE: 0.09 E9/L (ref 0.1–0.95)
MONOCYTES RELATIVE PERCENT: 0.9 % (ref 2–12)
NEUTROPHILS ABSOLUTE: 8.81 E9/L (ref 1.8–7.3)
NEUTROPHILS RELATIVE PERCENT: 99.1 % (ref 43–80)
OVALOCYTES: ABNORMAL
PDW BLD-RTO: 18.1 FL (ref 11.5–15)
PHOSPHORUS: 3.2 MG/DL (ref 2.5–4.5)
PLATELET # BLD: 228 E9/L (ref 130–450)
PMV BLD AUTO: 9.9 FL (ref 7–12)
POIKILOCYTES: ABNORMAL
POLYCHROMASIA: ABNORMAL
POTASSIUM SERPL-SCNC: 3.8 MMOL/L (ref 3.5–5)
POTASSIUM SERPL-SCNC: 4.2 MMOL/L (ref 3.5–5)
RBC # BLD: 2.74 E12/L (ref 3.5–5.5)
SODIUM BLD-SCNC: 133 MMOL/L (ref 132–146)
SODIUM BLD-SCNC: 136 MMOL/L (ref 132–146)
TARGET CELLS: ABNORMAL
TOTAL PROTEIN: 6.3 G/DL (ref 6.4–8.3)
WBC # BLD: 8.9 E9/L (ref 4.5–11.5)

## 2022-04-01 PROCEDURE — 6370000000 HC RX 637 (ALT 250 FOR IP): Performed by: INTERNAL MEDICINE

## 2022-04-01 PROCEDURE — 6360000002 HC RX W HCPCS: Performed by: INTERNAL MEDICINE

## 2022-04-01 PROCEDURE — 82962 GLUCOSE BLOOD TEST: CPT

## 2022-04-01 PROCEDURE — 80076 HEPATIC FUNCTION PANEL: CPT

## 2022-04-01 PROCEDURE — 2060000000 HC ICU INTERMEDIATE R&B

## 2022-04-01 PROCEDURE — 97530 THERAPEUTIC ACTIVITIES: CPT

## 2022-04-01 PROCEDURE — 97535 SELF CARE MNGMENT TRAINING: CPT

## 2022-04-01 PROCEDURE — S5553 INSULIN LONG ACTING 5 U: HCPCS | Performed by: INTERNAL MEDICINE

## 2022-04-01 PROCEDURE — 2580000003 HC RX 258: Performed by: INTERNAL MEDICINE

## 2022-04-01 PROCEDURE — 80048 BASIC METABOLIC PNL TOTAL CA: CPT

## 2022-04-01 PROCEDURE — 82330 ASSAY OF CALCIUM: CPT

## 2022-04-01 PROCEDURE — 83735 ASSAY OF MAGNESIUM: CPT

## 2022-04-01 PROCEDURE — 6370000000 HC RX 637 (ALT 250 FOR IP): Performed by: STUDENT IN AN ORGANIZED HEALTH CARE EDUCATION/TRAINING PROGRAM

## 2022-04-01 PROCEDURE — 6370000000 HC RX 637 (ALT 250 FOR IP): Performed by: NURSE PRACTITIONER

## 2022-04-01 PROCEDURE — 99232 SBSQ HOSP IP/OBS MODERATE 35: CPT | Performed by: INTERNAL MEDICINE

## 2022-04-01 PROCEDURE — 85025 COMPLETE CBC W/AUTO DIFF WBC: CPT

## 2022-04-01 PROCEDURE — 6360000002 HC RX W HCPCS: Performed by: NURSE PRACTITIONER

## 2022-04-01 PROCEDURE — 84100 ASSAY OF PHOSPHORUS: CPT

## 2022-04-01 RX ORDER — MAGNESIUM SULFATE 1 G/100ML
1000 INJECTION INTRAVENOUS ONCE
Status: COMPLETED | OUTPATIENT
Start: 2022-04-01 | End: 2022-04-01

## 2022-04-01 RX ADMIN — Medication 5 MG: at 20:12

## 2022-04-01 RX ADMIN — ACETAMINOPHEN 650 MG: 325 TABLET ORAL at 06:35

## 2022-04-01 RX ADMIN — LEVOTHYROXINE SODIUM 88 MCG: 0.09 TABLET ORAL at 06:25

## 2022-04-01 RX ADMIN — Medication 1000 UNITS: at 09:19

## 2022-04-01 RX ADMIN — SEVELAMER CARBONATE 800 MG: 800 TABLET, FILM COATED ORAL at 17:01

## 2022-04-01 RX ADMIN — DILTIAZEM HYDROCHLORIDE 90 MG: 30 TABLET, FILM COATED ORAL at 01:26

## 2022-04-01 RX ADMIN — MIRTAZAPINE 15 MG: 15 TABLET, FILM COATED ORAL at 20:12

## 2022-04-01 RX ADMIN — EPOETIN ALFA-EPBX 3000 UNITS: 3000 INJECTION, SOLUTION INTRAVENOUS; SUBCUTANEOUS at 09:17

## 2022-04-01 RX ADMIN — INSULIN GLARGINE-YFGN 1 UNITS: 100 INJECTION, SOLUTION SUBCUTANEOUS at 20:12

## 2022-04-01 RX ADMIN — ESCITALOPRAM 10 MG: 10 TABLET, FILM COATED ORAL at 09:18

## 2022-04-01 RX ADMIN — INSULIN LISPRO 1 UNITS: 100 INJECTION, SOLUTION INTRAVENOUS; SUBCUTANEOUS at 09:18

## 2022-04-01 RX ADMIN — INSULIN LISPRO 2 UNITS: 100 INJECTION, SOLUTION INTRAVENOUS; SUBCUTANEOUS at 20:12

## 2022-04-01 RX ADMIN — METRONIDAZOLE 500 MG: 500 TABLET ORAL at 21:05

## 2022-04-01 RX ADMIN — INSULIN LISPRO 1 UNITS: 100 INJECTION, SOLUTION INTRAVENOUS; SUBCUTANEOUS at 17:01

## 2022-04-01 RX ADMIN — DILTIAZEM HYDROCHLORIDE 90 MG: 30 TABLET, FILM COATED ORAL at 12:02

## 2022-04-01 RX ADMIN — DILTIAZEM HYDROCHLORIDE 90 MG: 30 TABLET, FILM COATED ORAL at 20:12

## 2022-04-01 RX ADMIN — METRONIDAZOLE 500 MG: 500 TABLET ORAL at 06:25

## 2022-04-01 RX ADMIN — FAMOTIDINE 10 MG: 20 TABLET ORAL at 09:19

## 2022-04-01 RX ADMIN — ACETAMINOPHEN 650 MG: 325 TABLET ORAL at 20:11

## 2022-04-01 RX ADMIN — ONDANSETRON 4 MG: 2 INJECTION INTRAMUSCULAR; INTRAVENOUS at 06:35

## 2022-04-01 RX ADMIN — INSULIN LISPRO 1 UNITS: 100 INJECTION, SOLUTION INTRAVENOUS; SUBCUTANEOUS at 12:01

## 2022-04-01 RX ADMIN — HYDROCORTISONE SODIUM SUCCINATE 50 MG: 100 INJECTION, POWDER, FOR SOLUTION INTRAMUSCULAR; INTRAVENOUS at 09:19

## 2022-04-01 RX ADMIN — METRONIDAZOLE 500 MG: 500 TABLET ORAL at 14:20

## 2022-04-01 RX ADMIN — DILTIAZEM HYDROCHLORIDE 90 MG: 30 TABLET, FILM COATED ORAL at 06:30

## 2022-04-01 RX ADMIN — ROPINIROLE 0.25 MG: 0.25 TABLET, FILM COATED ORAL at 20:12

## 2022-04-01 RX ADMIN — ONDANSETRON 4 MG: 2 INJECTION INTRAMUSCULAR; INTRAVENOUS at 18:00

## 2022-04-01 RX ADMIN — MAGNESIUM SULFATE 1000 MG: 1 INJECTION INTRAVENOUS at 09:31

## 2022-04-01 RX ADMIN — SODIUM CHLORIDE, PRESERVATIVE FREE 100 UNITS: 5 INJECTION INTRAVENOUS at 21:07

## 2022-04-01 RX ADMIN — SEVELAMER CARBONATE 800 MG: 800 TABLET, FILM COATED ORAL at 09:17

## 2022-04-01 RX ADMIN — SEVELAMER CARBONATE 800 MG: 800 TABLET, FILM COATED ORAL at 12:02

## 2022-04-01 RX ADMIN — SODIUM CHLORIDE, PRESERVATIVE FREE 100 UNITS: 5 INJECTION INTRAVENOUS at 09:18

## 2022-04-01 RX ADMIN — MAGNESIUM HYDROXIDE/ALUMINUM HYDROXICE/SIMETHICONE: 120; 1200; 1200 SUSPENSION ORAL at 12:03

## 2022-04-01 RX ADMIN — ARIPIPRAZOLE 5 MG: 5 TABLET ORAL at 20:12

## 2022-04-01 RX ADMIN — Medication 10 ML: at 20:12

## 2022-04-01 RX ADMIN — Medication 10 ML: at 09:31

## 2022-04-01 ASSESSMENT — PAIN SCALES - GENERAL
PAINLEVEL_OUTOF10: 0
PAINLEVEL_OUTOF10: 6
PAINLEVEL_OUTOF10: 0
PAINLEVEL_OUTOF10: 7
PAINLEVEL_OUTOF10: 1
PAINLEVEL_OUTOF10: 3
PAINLEVEL_OUTOF10: 3
PAINLEVEL_OUTOF10: 7

## 2022-04-01 ASSESSMENT — PAIN DESCRIPTION - DESCRIPTORS
DESCRIPTORS: CRAMPING
DESCRIPTORS: HEADACHE
DESCRIPTORS: CRAMPING

## 2022-04-01 ASSESSMENT — PAIN - FUNCTIONAL ASSESSMENT
PAIN_FUNCTIONAL_ASSESSMENT: PREVENTS OR INTERFERES SOME ACTIVE ACTIVITIES AND ADLS
PAIN_FUNCTIONAL_ASSESSMENT: ACTIVITIES ARE NOT PREVENTED
PAIN_FUNCTIONAL_ASSESSMENT: PREVENTS OR INTERFERES SOME ACTIVE ACTIVITIES AND ADLS
PAIN_FUNCTIONAL_ASSESSMENT: PREVENTS OR INTERFERES SOME ACTIVE ACTIVITIES AND ADLS

## 2022-04-01 ASSESSMENT — PAIN DESCRIPTION - LOCATION
LOCATION: ABDOMEN
LOCATION: ABDOMEN
LOCATION: ABDOMEN;HEAD
LOCATION: HEAD

## 2022-04-01 ASSESSMENT — PAIN DESCRIPTION - PAIN TYPE
TYPE: ACUTE PAIN

## 2022-04-01 ASSESSMENT — PAIN DESCRIPTION - ORIENTATION
ORIENTATION: MID

## 2022-04-01 ASSESSMENT — PAIN DESCRIPTION - ONSET
ONSET: ON-GOING

## 2022-04-01 ASSESSMENT — PAIN DESCRIPTION - FREQUENCY
FREQUENCY: INTERMITTENT
FREQUENCY: INTERMITTENT
FREQUENCY: CONTINUOUS
FREQUENCY: INTERMITTENT

## 2022-04-01 ASSESSMENT — PAIN DESCRIPTION - PROGRESSION
CLINICAL_PROGRESSION: NOT CHANGED

## 2022-04-01 NOTE — PROGRESS NOTES
Department of Internal Medicine  Nephrology Progress Note      Events reviewed. SUBJECTIVE:  We are following Miss Matthew Quiroga for ESKD on HD. Patient reports no complaints, sitting up to chair at the time of my examination.     PHYSICAL EXAM:      Vitals:    VITALS:  BP (!) 144/80   Pulse 91   Temp 97.4 °F (36.3 °C) (Oral)   Resp (!) 7   Ht 5' 4\" (1.626 m)   Wt 142 lb 3.2 oz (64.5 kg)   SpO2 100%   BMI 24.41 kg/m²   24HR INTAKE/OUTPUT:      Intake/Output Summary (Last 24 hours) at 4/1/2022 1258  Last data filed at 4/1/2022 0900  Gross per 24 hour   Intake 520 ml   Output 1800 ml   Net -1280 ml       Access: RIJ tunneled dialysis catheter  Constitutional: Awake, alert, NAD  HEENT:  PERRL, normocephalic, atraumatic  Respiratory:  CTA bilateral  Cardiovascular/Edema:  ST, RRR, no murmur gallop or rub  Gastrointestinal:  abd distended, c/o persistent abdominal pain  Neurologic: A&OX3 follows commands, no focal deficit  Skin:  Warm, dry, ecchymotic areas to abdomen    Other:    Scheduled Meds:   hydrocortisone sodium succinate PF  50 mg IntraVENous Daily    insulin lispro  0-3 Units SubCUTAneous 4x Daily AC & HS    sevelamer  800 mg Oral TID WC    metroNIDAZOLE  500 mg Oral 3 times per day    sodium chloride flush  5-40 mL IntraVENous 2 times per day    heparin flush  1 mL IntraVENous 2 times per day    insulin glargine  1 Units SubCUTAneous Nightly    melatonin  5 mg Oral Nightly    dilTIAZem  90 mg Oral Q6H    mirtazapine  15 mg Oral Nightly    escitalopram  10 mg Oral Daily    ARIPiprazole  5 mg Oral Nightly    rOPINIRole  0.25 mg Oral Nightly    levothyroxine  88 mcg Oral Daily    famotidine  10 mg Oral Daily    epoetin nena-epbx  3,000 Units SubCUTAneous Once per day on Mon Wed Fri    Vitamin D  1,000 Units Per NG tube Daily     Continuous Infusions:   sodium chloride      sodium chloride      dextrose       PRN Meds:.sodium chloride, sodium chloride flush, sodium chloride, heparin flush, ondansetron, white petrolatum, polyethylene glycol, acetaminophen **OR** acetaminophen, glucose, dextrose, glucagon (rDNA), dextrose, albuterol, labetalol    DATA:    CBC:   Lab Results   Component Value Date    WBC 8.9 04/01/2022    RBC 2.74 04/01/2022    HGB 7.0 04/01/2022    HCT 22.1 04/01/2022    MCV 80.7 04/01/2022    MCH 25.5 04/01/2022    MCHC 31.7 04/01/2022    RDW 18.1 04/01/2022     04/01/2022    MPV 9.9 04/01/2022     CMP:    Lab Results   Component Value Date     04/01/2022    K 3.8 04/01/2022    K 3.7 03/04/2022     04/01/2022    CO2 27 04/01/2022    BUN 13 04/01/2022    CREATININE 1.9 04/01/2022    GFRAA 38 04/01/2022    LABGLOM 38 04/01/2022    GLUCOSE 172 04/01/2022    GLUCOSE 130 05/18/2012    PROT 6.3 04/01/2022    LABALBU 2.4 04/01/2022    LABALBU 4.1 05/18/2012    CALCIUM 7.8 04/01/2022    BILITOT 0.2 04/01/2022    ALKPHOS 173 04/01/2022    AST 29 04/01/2022    ALT 26 04/01/2022     Magnesium:    Lab Results   Component Value Date    MG 1.7 04/01/2022     Phosphorus:    Lab Results   Component Value Date    PHOS 3.2 04/01/2022     Radiology Review:      CT Head WO Contrast March 27, 2022   No acute intracranial abnormality or hemorrhage.         CT Abdomen Pelvis WO Contrast March 27, 2022   1.  Moderate ascites throughout abdomen and pelvis.       2.  Multiple thick walled loops of small bowel throughout the abdomen could   suggest nonspecific infectious or inflammatory enteritis.       3.  Generalized body wall edema.       4.  Small bilateral pleural effusions with adjacent atelectatic changes.             Chest X-Ray March 28, 2022   Infiltrate and or atelectasis at the left lower lobe.  Substantially resolved   infiltrate and or atelectasis on the right.  New NG tube.               BRIEF SUMMARY OF INITIAL CONSULT:    Briefly, Miss Crissy Mittal is a 36year-old female with a PMH of ESRD on hemodialysis 3 days/wk, on TTS schedule at [facility] I DM, poorly controlled with recurrent admissions for DKA, HTN, gastroparesis, ascites, infective endocarditis, cardiac arrest, hypothyroidism and depression who was admitted on March 27, 2022 after she was found unresponsive at the SNF where she resides. Patient was found to be profoundly hypoglycemic and EMS was called. She was intubated in ER for airway protection as she remained unresponsive. CT head showed no acute intracranial abnormality or hemorrhage, chest x-ray demonstrates right perihilar opacity concerning for aspiration pneumonia. She was ultimately admitted to MICU for further evaluation. Labs on admission were significant for sodium 135, potassium 5.0, chloride 100, bicarbonate 25, BUN 38, creatinine 4.9, proBNP >70,000. We are consulted for dialysis management. Problems resolved. · Hypoglycemia, was on D10, now hyperglycemic with +ketones (beta-hydroxybutyrate >4.5)  · Acute respiratory failure, 2/2 aspiration pneumonia? On 40% Fio2. Extubated 4/90  · Acute metabolic encephalopathy 2/2 hypoglycemia. Resolving   · Acidemia, pH 7.311, PCO2 35.0, HCO3 47.4, metabolic acidosis from DKA    IMPRESSION/RECOMMENDATIONS:      1. ESRD 3 times a week TTS via RIJ tunneled dialysis catheter. HD initiated September 2021 after failed peritoneal dialysis with persistent hyperkalemia. For dialysis three times/wk TTS while inpatient. 2. HTN, on cardizem 90 mg po qid  3. MBD of CKD, continue sevelamer 800 mg po tid with meals, and monitor phosphorus levels  4. Anemia of CKD, Hgb 6.5 mg/dL on admission, continue epoetin alpha 3,000 unit 3 times/wk. S/p transfusion  5. Vitamin D deficiency, on ergocalciferol 1000 po daily  6. Adrenal insufficiency? Plan is for cosyntropin stim test. On stress does steroids  ------------------------------------------------------  7. Type I DM, poorly controlled with frequent readmissions for DKA, on SSI, lantus decreased to 1 unit nightly  8. Aspiration pneumonia? On piperacillin    9.  Recent Covid 19 infection, unvaccinated  8. Restless leg syndrome, on ropinirole at home  11. Depression, on escitalopram and Abilify  12. Hypothyroidism, on levothyroxine   13. History of gastroparesis, on metoclopramide at home. For EGD and pyloric dilatation with Botox injection outpatient per GI.  14. Hx recurrent C. difficile infection, on flagyl po Q 8hrs X 3 days  15.  Hx of MDRO UTI  16. Nutrition, diabetic diet     Plan:     · Continue HD today and three times/wk, TTS while inpatient, for dialysis tomorrow  · Continue epoetin alpha 3,000 units 3 times/wk  · Continue to monitor labs daily   · Continue to monitor glucose levels  · Continue to monitor calcium levels  · Monitor phosphorus levels daily  · Monitor H&H, transfuse <7    Electronically signed by HEBER Keita CNP on 4/1/2022 at 12:58 PM

## 2022-04-01 NOTE — PROGRESS NOTES
[facility]    Attending Physician Statement:  Maria Luisa Feng. RADHA.O. I have discussed the case, including pertinent history and exam findings with the resident. I agree with the assessment, plan and orders as documented by the resident. Patient here for routine follow up of medical problems. Hypoglycemia: currently being treated as adrenal insufficiency with oral glucocorticoids; further AI evaluation ordered per endocrinology to be performed as outpatient 2/2 hypocortisolism on presentation and empiric treatment with corticosteroids; plan to treat any hyperglycemia with insulin; patient feels that outpatient nursing facility was prescribing her too high of a dose of insulin     End-stage renal disease on hemodialysis: Nephrology consulted and for dialysis on a TTS schedule, care per their recommendations,     History of recurrent C. difficile infections: Patient currently receiving Flagyl per infectious disease recommendations    For DC when accepted at facility     Remainder of medical problems as per resident note.

## 2022-04-01 NOTE — PROGRESS NOTES
ENDOCRINOLOGY PROGRESS NOTE      Date of admission: 3/27/2022  Date of service: 4/1/2022  Admitting physician: Robert Antonio DO   Primary Care Physician: Lola Damon MD  Consultant physician: Liseth Iverson MD     Reason for the consultation:  DM type 1, labile diabetes     History of Present Illness: The history is provided from medical records, pt sedated intubated     Efrain Barnett is a 36 y.o. old female nursing home resident with PMH of Type I DM, ESRD on PD,HTN,hypothyroidism, infective endocarditis and other listed below admitted to [facility] on 3/27/2022 because of AMS. Endocrine service was consulted for DM management.      subjective   Seen and examined in ICU this AM, No acute events,  BG variable     Point of care glucose monitoring (Independently reviewed)   Recent Labs     03/31/22  0209 03/31/22  0631 03/31/22  0843 03/31/22  1505 03/31/22  1554 03/31/22  1822 03/31/22  2108 04/01/22  0126   GLUMET 309* 254* 264* 189* 188* 230* 229* 162*       Scheduled Meds:   hydrocortisone sodium succinate PF  50 mg IntraVENous Q12H    sevelamer  800 mg Oral TID WC    metroNIDAZOLE  500 mg Oral 3 times per day    sodium chloride flush  5-40 mL IntraVENous 2 times per day    heparin flush  1 mL IntraVENous 2 times per day    insulin glargine  1 Units SubCUTAneous Nightly    insulin lispro  0-3 Units SubCUTAneous Q4H    melatonin  5 mg Oral Nightly    dilTIAZem  90 mg Oral Q6H    mirtazapine  15 mg Oral Nightly    escitalopram  10 mg Oral Daily    ARIPiprazole  5 mg Oral Nightly    rOPINIRole  0.25 mg Oral Nightly    levothyroxine  88 mcg Oral Daily    famotidine  10 mg Oral Daily    epoetin nena-epbx  3,000 Units SubCUTAneous Once per day on Mon Wed Fri    Vitamin D  1,000 Units Per NG tube Daily     PRN Meds:   sodium chloride, , PRN  sodium chloride flush, 5-40 mL, PRN  sodium chloride, , PRN  heparin flush, 1 mL, PRN  ondansetron, 4 mg, Q6H PRN  white petrolatum, , BID PRN  polyethylene glycol, 17 g, Daily PRN  acetaminophen, 650 mg, Q6H PRN   Or  acetaminophen, 650 mg, Q6H PRN  glucose, 15 g, PRN  dextrose, 12.5 g, PRN  glucagon (rDNA), 1 mg, PRN  dextrose, 100 mL/hr, PRN  albuterol, 2.5 mg, Q6H PRN  labetalol, 5 mg, Q6H PRN      Continuous Infusions:   sodium chloride      sodium chloride      dextrose         Review of Systems  Non-obtainable     OBJECTIVE    BP (!) 150/96   Pulse 85   Temp 97.4 °F (36.3 °C) (Axillary)   Resp 16   Ht 5' 4\" (1.626 m)   Wt 142 lb 3.2 oz (64.5 kg)   SpO2 99%   BMI 24.41 kg/m²     Intake/Output Summary (Last 24 hours) at 4/1/2022 0650  Last data filed at 3/31/2022 1615  Gross per 24 hour   Intake 300 ml   Output 1800 ml   Net -1500 ml       Physical examination:  Due to this being a TeleHealth encounter, evaluation of the following organ systems is limited: Vitals/Constitutional/EENT/Resp/CV/GI//MS/Neuro/Skin/Heme-Lymph-Imm. Modified physical exam through Telemedicine camera    General: lethrgic  Neck: no obvious neck mass. No obvious neck deformity     CVS: no distress   Chest: no distress. Chest is moving with respiration    Extremities:  no visible tremor  Skin: No visible rashes as seen from camera   Musculoskeletal: no visible deformity      Review of Laboratory Data:  I personally reviewed the following labs:   Recent Labs     03/31/22  0403 03/31/22  0403 03/31/22  0523 03/31/22  0949 04/01/22  0431   WBC 12.2*  --   --  12.8* 8.9   RBC 2.58*  --   --  2.94* 2.74*   HGB 6.8*   < > 7.0* 7.7* 7.0*   HCT 21.3*   < > 21.3* 24.7*  23.8* 22.1*   MCV 82.6  --   --  84.0 80.7   MCH 26.4  --   --  26.2 25.5*   MCHC 31.9*  --   --  31.2* 31.7*   RDW 17.7*  --   --  18.2* 18.1*     --   --  280 228   MPV 10.2  --   --  10.3 9.9    < > = values in this interval not displayed.      Recent Labs     03/30/22  0505 03/30/22  1720 03/31/22  0403 03/31/22  1806 04/01/22  0431      < > 133 135 136   K 4.8   < > 4.3 3.3* 3.8   CL 98   < > 99 98 101 CO2 26   < > 23 24 27   BUN 15   < > 25* 10 13   CREATININE 1.9*   < > 2.7* 1.3* 1.9*   GLUCOSE 148*   < > 292* 234* 172*   CALCIUM 7.7*   < > 7.5* 8.2* 7.8*   PROT 5.9*  --  6.2*  --  6.3*   LABALBU 2.2*  --  2.4*  --  2.4*   BILITOT 0.3  --  <0.2  --  0.2   ALKPHOS 183*  --  186*  --  173*   AST 17  --  43*  --  29   ALT 18  --  26  --  26    < > = values in this interval not displayed. Beta-Hydroxybutyrate   Date Value Ref Range Status   03/29/2022 >4.50 (H) 0.02 - 0.27 mmol/L Final   03/27/2022 0.10 0.02 - 0.27 mmol/L Final   03/02/2022 2.05 (H) 0.02 - 0.27 mmol/L Final     Lab Results   Component Value Date    LABA1C 7.7 03/02/2022    LABA1C 8.7 12/08/2021    LABA1C 10.2 11/23/2021     Lab Results   Component Value Date/Time    TSH 5.370 (H) 03/28/2022 01:56 AM    T4FREE 1.10 03/28/2022 01:56 AM    V7XRRSL 8.6 11/05/2015 02:20 AM    FT3 1.3 (L) 10/31/2020 10:43 AM    G8QAXVI 36.73 (L) 07/20/2020 02:00 PM     Lab Results   Component Value Date    LABA1C 7.7 03/02/2022    GLUCOSE 172 04/01/2022    GLUCOSE 130 05/18/2012    MALBCR 2949.3 01/15/2020    LABMICR 1740.1 01/15/2020    LABCREA 59 01/15/2020    LABCREA 61 01/15/2020     Lab Results   Component Value Date    TRIG 82 01/14/2020    HDL 33 01/14/2020    LDLCALC 46 01/14/2020    CHOL 95 01/14/2020       Blood culture   Lab Results   Component Value Date    BC 24 Hours no growth 03/28/2022    BC 24 Hours no growth 03/27/2022       Radiology:  XR CHEST PORTABLE   Final Result   1. The lungs are clear. There is no infiltrate or effusion. 2. Stable position of the support lines and tubes. XR CHEST PORTABLE   Final Result   1. There is no acute cardiopulmonary disease   2. Stable position of the support lines and tubes. XR CHEST PORTABLE   Final Result   Infiltrate and or atelectasis at the left lower lobe. Substantially resolved   infiltrate and or atelectasis on the right. New NG tube.          CT HEAD WO CONTRAST   Final Result   No acute intracranial abnormality or hemorrhage. CT ABDOMEN PELVIS WO CONTRAST Additional Contrast? None   Final Result   1. Moderate ascites throughout abdomen and pelvis. 2.  Multiple thick walled loops of small bowel throughout the abdomen could   suggest nonspecific infectious or inflammatory enteritis. 3.  Generalized body wall edema. 4.  Small bilateral pleural effusions with adjacent atelectatic changes. XR ABDOMEN FOR NG/OG/NE TUBE PLACEMENT   Final Result   Enteric tube tip in the body of the stomach. XR CHEST PORTABLE   Final Result   Right perihilar opacity. XR CHEST PORTABLE    (Results Pending)       Medical Records/Labs/Images review:   I personally reviewed and summarized previous records   All labs and imaging were reviewed independently     [address] Maldonado, a 36 y.o.-old female seen today for inpatient diabetes management     Diabetes Mellitus type I    · The patient is extremely insulin sensitive   · we recommend the following sq insulin regimen   · Lantus ONE units daily  · Modified Low dose sliding scale (1u:100>200) with meals and at night   · Continue following BG and adjust insulin regimen    Evaluation for adrenal insufficiency   · With type 1 AM and primary hypothyroidism, will need to r/o AI in this patient (Autoimmune Polyendocrine syndrome type 2, or Shmidth syndrome)   Plan to perform cosyntropin stim test after recovered from her current critical condition   Currently on stress dose steroids     primary Hypothyroidism  · Levothyroxine was adjusted during this admission to 88 mcg daily      ESRD  · Pt at risk for hypoglycemia  · On dialysis, nephrology following    Thank you for allowing us to participate in the care of this patient. Please do not hesitate to contact us with any additional questions.      Arleth Castle MD  Endocrinologist, [facility]   [address] [facility], [address] [place] [address] Phone: [phone]  Fax: [phone]  ----------------------------  An electronic signature was used to authenticate this note.  Miguel A Clark MD on 4/1/2022 at 6:50 AM

## 2022-04-01 NOTE — CARE COORDINATION
Care Coordination  The patient is on room air and the therapies have worked with the patient. The patient is on  hemo dialysis  three days a week . She has a Right IJ tunneled catheter. One of the physicians asked if precert can be started on this patient. I called [address] [place] [address], plan is to start the precert today for the patient to return there once she is medically stable. Return envelope done.

## 2022-04-01 NOTE — PROGRESS NOTES
[address]  [address] [facility], [address] [place] [address]                                                               Patient Name: Ghada Floyd  MRN: [id number]  : 1990  Room: [address] [place], [place] [address]A    Evaluating OT: ELOINA Capps,  OTR/L; [id number]    Referring Provider: Terry Goodwin MD   Specific Provider Orders/Date: OT eval and treat (3/30/22)       Diagnosis: Altered mental status [Q77.27]  Acute metabolic encephalopathy [V86.57]     Reason for admission: Pt admitted with AMS, hypoglycemia from SNF. Surgery/Procedures: None this admission.      Pertinent Medical History:    Past Medical History:   Diagnosis Date    Acute congestive heart failure ([address]     BC (acute kidney injury) ([address] 10/01/2019    Cardiac arrest ([address] 02/15/2021    Cephalgia 10/09/2019    Chronic kidney disease     Depression     Diabetes mellitus ([address]     Diabetic gastroparesis associated with type 1 diabetes mellitus ([address] 2018    Diabetic ketoacidosis ([address] 2011    Diabetic ketoacidosis with coma associated with type 1 diabetes mellitus ([address] 2013    Diabetic polyneuropathy associated with type 1 diabetes mellitus ([address] 2020    Drug use complicating pregnancy in third trimester     Endocarditis 10/31/2020    ESRD (end stage renal disease) ([address] 2020    [facility] stress [facility]    [facility]    [facility]od[facility])     History of blood transfusion 2019    Hyperlipidemia 10/08/2020    Hyperosmolar hyperglycemic state (HHS) ([address] 2020    Hypothyroidism 10/08/2020    Iron deficiency anemia 10/01/2019    MDRO (multiple drug resistant organisms) resistance     MRSA (methicillin resistant Staphylococcus aureus)     back wound abcess    Non compliance w medication regimen 2016    Other disorders of kidney and ureter     Pregnancy 2016    16 weeks    Previous  delivery affecting pregnancy, antepartum 2017    Previous stillbirth or demise, antepartum 2016    Seizure ([facility] 2020    Severe pre-eclampsia in third trimester 2016    Shock liver 02/15/2021    Valvular endocarditis 11/10/2020    This Diagnosis was added to the Problem List based on transcribed orders from Dr. Matteo Vo           *Precautions:  Fall Risk, contact precautions    Assessment of current deficits   [x] Functional mobility  [x]ADLs  [x] Strength               [x]Cognition   [x] Functional transfers   [x] IADLs         [x] Safety Awareness   [x]Endurance   [] Fine Coordination        [] ROM     [] Vision/perception   []Sensation    []Gross Motor Coordination [x] Balance   [] Delirium                  []Motor Control     [] Communication    OT PLAN OF CARE   OT POC based on physician orders, patient diagnosis and results of clinical assessment.        Frequency/Duration: 1-3 days/wk for 1-2 weeks PRN    Specific OT Treatment Interventions to include:   * Instruction/training on adapted ADL techniques and AE recommendations to increase functional independence within precautions       * Training on energy conservation strategies, correct breathing pattern and techniques to improve independence/tolerance for self-care routine  * Functional transfer/mobility training/DME recommendations for increased independence, safety, and fall prevention  * Patient/Family education to increase follow through with safety techniques and functional independence  * Recommendation of environmental modifications for increased safety with functional transfers/mobility and ADLs  * Cognitive retraining/development of therapeutic activities to improve problem solving, judgement, memory, and attention for increased safety/participation in ADL/IADL tasks  * Sensory re-education to improve body/limb awareness, maintain/improve skin integrity, and improve hand/UE motor function  * Visual-perceptual training to improve environmental scanning, visual attention/focus, and oculomotor skills for increased safety/independence with functional transfers/mobility and ADLs  * Splinting/positioning for increased function, prevention of contractures, and improve skin integrity  * Therapeutic exercise to improve motor endurance, ROM, and functional strength for ADLs/functional transfers  * Therapeutic activities to facilitate/challenge dynamic balance, stand tolerance for increased safety and independence with ADLs  * Therapeutic activities to facilitate gross/fine motor skills for increased independence with ADLs  * Neuro-muscular re-education: facilitation of righting/equilibrium reactions, midline orientation, scapular stability/mobility, normalization of muscle tone, and facilitation of volitional active controled movement  * Positioning to improve skin integrity, interaction with environment and functional independence  * Delirium prevention/treatment  * Manual techniques for edema management      Recommended Adaptive Equipment: shower chair, TBD     Home Living: Pt lives Mother and 2 sisters  in a 1 story home with 4 step(s) to enter and no rail(s); bed/bath on main floor. Bathroom setup: tub/shower  Equipment owned: wc, walker. Prior Level of Function: Per patient received assist with ADLs; Assist with IADLs. Walker/wc for ambulation. Driving: No  Occupation: None    Pain Level: pt c/o 7/10 pain this session, however unable to state location. Cognition: A&O: 3/4  Not oriented to day; Follows 2 step commands with MIN verbal cues. Pt requiring increased time to complete tasks d/t increased fatigue. Memory: F+   Comprehension: G-   Problem solving: F+   Judgement/safety: F+               Communication skills: WFL; limited interactions with therapist, however pleasant and cooperative. Vision: [facility]               Glasses: Yes Hearing: WFL     RASS: 0  CAM-ICU: (NT) Delirium    UE Assessment:  Hand Dominance: Right [x]  Left []     ROM Strength   RUE  WFL Grossly 3+/5   LUE WFL Grossly 3+/5     Sensation: No c/o numbness/tingling in BUE extremities. Tone: WNL  Edema: Unremarkable. Functional Assessment:  AM-PAC Daily Activity Raw Score: 17/24   Initial Eval Status  Date: 3/31/22 Treatment Status  Date: 4/1/22 STGs = LTGs  Time frame: 7-14 days   Feeding Sup  Increased time and effort to complete task. Ind. Ind.       Grooming Min A  While standing at sink to wash hands. Min A  While standing at sink to brush teeth, wash hands, and face. ~10 minutes                     Ind.        UB dressing/bathing Min A  While seated at EOB. Max verbal cues for task completion d/t fatigue. Min A  While seated EOB. Ind.       LB dressing/bathing Mod A overall  Min A  While seated on lowered surface to don/doff socks. Increased time and effort to complete with mod verbal cues. Min A  To don pants while seated at EOB. Assist for steadying in standing and pulling over feet. Ind.        Toileting Max A  Max A for STS from commode, Mod A for thoroughness of hygiene. Pt incontinent of bowel during STS transfers. NT  Pt declining toileting this date. SBA     Bed Mobility  Supine to sit:   Min A    Sit to supine:   Min A Sup to sit:  SBA    Sit to supine:   NT                     Ind.     Functional Transfers Sit to stand:   Min A    Stand to sit:   Min A    Commode: Max A with use of grab bars. Sit to stand:   Min A    Stand to sit:   Min A STS: Ind. Commode: SBA   Functional Mobility Min A with FWW  For short household distances  Bed<>bathroom. Min A with FWW  From bed<>bathroom  Pt reporting feeling increased \"wobbliness\" today. Ind.         Balance Sitting:     Static: CGA    Dynamic: Min A  Standing: Min A Sitting:     Static: SBA Dynamic: Min A  Standing: Min A  Sitting:     Static: Ind. Dynamic: Ind.  Standing: Ind. Endurance/Activity Tolerance   poor tolerance with light activity. Fair tolerance with light activity. [facility]   Visual/  Perceptual Impaired: pt reporting increased blurriness. Glasses: yes                     Vitals:   HR at rest: 98 bpm HR at end of session: NT   Spo2 at rest:NT Spo2 at end of session: NT   BP at rest:156/97 mmHg BP at end of session: NT       Treatment: OT treatment provided this date includes:      Instruction/training on safety and adapted techniques for completion of ADLs: to increase independence, safety, and alertness during self-care.   Instruction/training on safe bed mobility/functional mobility/transfer techniques: with focus on safety, body mechanics, and precautions    Instruction/training on energy conservation/work simplification for completion of ADLs: techniques to increase independence with self-care ADLs and IADLs, work simplification to improve endurance.  Skilled Monitoring of Vitals:  to include BP and HR throughout session to maximize safety.  Sitting Balance/Tolerance: pt positioned up in chair to increase balance and activity tolerance during ADLs and facilitate proper posture and positioning.  Delirium Prevention: Environmental and sensory modifications assessed and implemented to decrease ICU acquired delirium and to improve overall orientation, mentation and pt interaction with family/staff. Line management and environmental modifications made prior to and end of session to ensure patient safety and to increase efficiency of session. Skilled monitoring of HR, O2 saturation, blood pressure and patient's response to activity performed throughout session. Comments: OK from RN to see patient. Upon arrival, patient supine in bed. Pt demo poor tolerance with fair understanding of education/techniques.   At end of session, patient seated in chair for breakfast. Call light within reach, all lines and tubes intact. Pt instructed on use of call light for assistance and fall prevention. Nursing notified of patient positioning. Patient presents with decreased ROM/strength, activity tolerance, dynamic balance, functional mobility limiting completion of ADLs and safety. Pt can benefit from continued skilled OT to increase safety, functional independence and quality of life.        Time In: 10:10 AM             Time Out: 10:39 AM         Total Treatment time: 29 minutes   Min Units   OT Eval Low 40355     OT Eval Medium 12528     OT Eval High [id number]     OT Re-Eval [id number]     Therapeutic Ex [id number]     Therapeutic Activities 86729     ADL/Self Care 15454 29 2   Orthotic Management 56012     Neuro Re-Ed 56586     Non-Billable Time         ELOINA Hernandez,  OTR/L; [id number]

## 2022-04-01 NOTE — PROGRESS NOTES
[facility] [address] [facility]  Internal Medicine Residency Program  Progress Note - House Team     Patient:  Stephanie Self 36 y.o. female MRN: [id number]     Date of Service: 4/1/2022     CC: Altered Mental Status 2/2 Hypoglycemia    Subjective   Brief Summary:   Ms. Moo Ervin is a 36year old female who was found obtunded and severely hypoglycemic at her nursing home on 3/27. She has a past medical history of ESRD on hemodialysis, poorly controlled TIDM, HTN, hypothyroidism, and depression. She was intubated for airway protection and admitted to the ICU.     Hospital Day: 6    Overnight Events:   Patient's K+ was replaced from 3.3 to 3.8.  Pt received Ultram for headache and Zofran for nausea. This AM     Patient was seen and examined this morning at bedside   Patient states she is feeling okay. She lost IV access yesterday so her antibiotics were switched to PO per ID recs. Her only complaint is abdominal discomfort and pain. She says she has mild watery diarrhea, but has not noticed any blood. Patient denies any fevers, nausea (at this time), and difficulty breathing. Objective     Physical Exam:  · Vitals: BP (!) 156/97   Pulse 96   Temp 97.4 °F (36.3 °C) (Oral)   Resp 9   Ht 5' 4\" (1.626 m)   Wt 142 lb 3.2 oz (64.5 kg)   SpO2 97%   BMI 24.41 kg/m²     · I & O - 24hr: No intake/output data recorded. · General Appearance: alert, appears stated age, cooperative, fatigued and no distress  · Lung: clear to auscultation bilaterally  · Heart: regular rate and rhythm, S1, S2 normal, no murmur, click, rub or gallop   · Abdomen: mildly distended and diffusely tender, no guarding; bowel sounds normal; no masses,  no organomegaly  · Extremities:  extremities normal, atraumatic, no cyanosis or edema and no edema, redness or tenderness in the calves or thighs  · Musculokeletal: No joint swelling, no muscle tenderness. ROM normal in all joints of extremities.    · Neurologic: Mental status: Alert, oriented, thought content appropriate  Subject  Pertinent Labs & Imaging Studies   jorge alberto  CBC:   Lab Results   Component Value Date    WBC 8.9 04/01/2022    RBC 2.74 04/01/2022    HGB 7.0 04/01/2022    HCT 22.1 04/01/2022    MCV 80.7 04/01/2022    MCH 25.5 04/01/2022    MCHC 31.7 04/01/2022    RDW 18.1 04/01/2022     04/01/2022    MPV 9.9 04/01/2022     Hemoglobin/Hematocrit:    Lab Results   Component Value Date    HGB 7.0 04/01/2022    HCT 22.1 04/01/2022         XR CHEST PORTABLE   Final Result   1. The lungs are clear. There is no infiltrate or effusion. 2. Stable position of the support lines and tubes. XR CHEST PORTABLE   Final Result   1. There is no acute cardiopulmonary disease   2. Stable position of the support lines and tubes. XR CHEST PORTABLE   Final Result   Infiltrate and or atelectasis at the left lower lobe. Substantially resolved   infiltrate and or atelectasis on the right. New NG tube. CT HEAD WO CONTRAST   Final Result   No acute intracranial abnormality or hemorrhage. CT ABDOMEN PELVIS WO CONTRAST Additional Contrast? None   Final Result   1. Moderate ascites throughout abdomen and pelvis. 2.  Multiple thick walled loops of small bowel throughout the abdomen could   suggest nonspecific infectious or inflammatory enteritis. 3.  Generalized body wall edema. 4.  Small bilateral pleural effusions with adjacent atelectatic changes. XR ABDOMEN FOR NG/OG/NE TUBE PLACEMENT   Final Result   Enteric tube tip in the body of the stomach. XR CHEST PORTABLE   Final Result   Right perihilar opacity. XR CHEST PORTABLE    (Results Pending)        Resident's Assessment and Plan     Assessment and Plan:    1. Acute metabolic encephalopathy 2/2 hypoglycemia  - Resovled  - repeat cosyntropin test after resolution of acute illness. - serum insulin level 2.0, C-peptide level . 1  - continue to wean hydrocortisone     2.  TIDM  - Endo following.   - highly sensitive to insulin  - patient placed on a modified low dose sliding scale with a max dose of 1 unit of Lantus daily.     3. Aspiration Pneumonia  - ID following   - IV zosyn d/c following losing access. - oxygen saturation of 99 on room air.      4. Recurrent C. Difficile  - po flagyl 500mg TID.      5. End Stage Renal Disease  - Continue dialysis Tues/Thurs/Saturday     6. Hypothyroidism  - continue levothyroxine 88mg daily.      7. Depression  - continue Abilify and Celexa    PT/OT evaluation: ordered  DVT prophylaxis/ GI prophylaxis: heparin/pepcid  Disposition: discharge from ICU to medical floor when possible.     Hillary Alejandro MS4  Attending physician: Dr. Ellyn Beyer

## 2022-04-01 NOTE — PROGRESS NOTES
[address] [facility]   Department of Internal Medicine   Internal Medicine Residency  MICU Progress Note    Patient:  Av Torres 36 y.o. female   MRN: [id number]       Date of Service: 2022    Allergy: Cefepime and Toradol [ketorolac tromethamine]    Subjective:     Critical Care Daily Progress Note: 2022 2:40 PM    Patient was seen and examined this morning at beside  Alert oriented x 3  lethargic but arousable    vitals stable   Blood sugar stable   For transfer out   Midline placed     24 hour change: stable     Objective     Vitals reviewed. I/O last 3 completed shifts: In: 597.6 [IV Piggyback:297.6]  Out: 1800   I/O this shift:  In: 220 [P.O.:220]  Out: -     TEMPERATURE:  Current - Temp: 97.4 °F (36.3 °C); Max - Temp  Av.5 °F (36.4 °C)  Min: 97.2 °F (36.2 °C)  Max: 97.7 °F (36.5 °C)  RESPIRATIONS RANGE: Resp  Av.5  Min: 6  Max: 19  PULSE RANGE: Pulse  Av.1  Min: 81  Max: 98  BLOOD PRESSURE RANGE:  Systolic (82OXB), DGB:895 , Min:121 , IUC:344   ; Diastolic (34DVM), BID:90, Min:80, Max:107    PULSE OXIMETRY RANGE: SpO2  Av.3 %  Min: 96 %  Max: 100 %    I & O - 24hr:    Intake/Output Summary (Last 24 hours) at 2022 1440  Last data filed at 2022 0900  Gross per 24 hour   Intake 520 ml   Output 1800 ml   Net -1280 ml     I/O last 3 completed shifts: In: 597.6 [IV Piggyback:297.6]  Out: 1800  I/O this shift:  In: 220 [P.O.:220]  Out: -    Weight change: 1.4 oz (0.04 kg)    Physical Exam:  General Appearance:   follow simple command, no acute distress    HEENT:    NC/AT, mucous membranes are moist.   Neck:   Supple, no jugular venous distention. Resp:     CTAB, No wheezes, No rhonchi, no use of accessory muscles   Heart:    RRR, S1 and S2 normal, no murmur, rub or gallop.     Abdomen:     Soft, non-tender, non-distended with normal bowel sounds   Extremities:   Atraumatic, no cyanosis or edema   Pulses:  Radial and pedal pulses are intact bilaterally Neurologic:  Follow simple command, somnolent but arousable         Medications:     Continuous Infusions:   sodium chloride      sodium chloride      dextrose       Scheduled Meds:   hydrocortisone sodium succinate PF  50 mg IntraVENous Daily    insulin lispro  0-3 Units SubCUTAneous 4x Daily AC & HS    sevelamer  800 mg Oral TID WC    metroNIDAZOLE  500 mg Oral 3 times per day    sodium chloride flush  5-40 mL IntraVENous 2 times per day    heparin flush  1 mL IntraVENous 2 times per day    insulin glargine  1 Units SubCUTAneous Nightly    melatonin  5 mg Oral Nightly    dilTIAZem  90 mg Oral Q6H    mirtazapine  15 mg Oral Nightly    escitalopram  10 mg Oral Daily    ARIPiprazole  5 mg Oral Nightly    rOPINIRole  0.25 mg Oral Nightly    levothyroxine  88 mcg Oral Daily    famotidine  10 mg Oral Daily    epoetin nena-epbx  3,000 Units SubCUTAneous Once per day on Mon Wed Fri    Vitamin D  1,000 Units Per NG tube Daily     PRN Meds: sodium chloride, sodium chloride flush, sodium chloride, heparin flush, ondansetron, white petrolatum, polyethylene glycol, acetaminophen **OR** acetaminophen, glucose, dextrose, glucagon (rDNA), dextrose, albuterol, labetalol  Nutrition:   NG/OG tube TF type: Pulmocare/Nephro/Glucerna/Jevity           Labs and Imaging Studies:     CBC:   Recent Labs     03/31/22  0403 03/31/22  0403 03/31/22  0523 03/31/22  0949 04/01/22 0431   WBC 12.2*  --   --  12.8* 8.9   HGB 6.8*   < > 7.0* 7.7* 7.0*   HCT 21.3*   < > 21.3* 24.7*  23.8* 22.1*   MCV 82.6  --   --  84.0 80.7     --   --  280 228    < > = values in this interval not displayed.        BMP:    Recent Labs     03/31/22  0403 03/31/22  1806 04/01/22 0431    135 136   K 4.3 3.3* 3.8   CL 99 98 101   CO2 23 24 27   BUN 25* 10 13   CREATININE 2.7* 1.3* 1.9*   GLUCOSE 292* 234* 172*       LIVER PROFILE:   Recent Labs     03/30/22  0505 03/31/22  0403 04/01/22  0431   AST 17 43* 29   ALT 18 26 26   BILIDIR <0.2 <0.2 <0.2   BILITOT 0.3 <0.2 0.2   ALKPHOS 183* 186* 173*       PT/INR:   Recent Labs     03/31/22  0949   PROTIME 12.1   INR 1.1       APTT:   Recent Labs     03/31/22  0949   APTT 33.3       Fasting Lipid Panel:    Lab Results   Component Value Date    CHOL 95 01/14/2020    TRIG 82 01/14/2020    HDL 33 01/14/2020       Cardiac Enzymes:    Lab Results   Component Value Date    CKTOTAL 22 11/22/2021    CKTOTAL 120 02/15/2021    CKTOTAL 99 11/04/2020    CKMB 2.1 10/31/2020    CKMB <1.0 03/24/2015    CKMB <1.0 03/23/2015    TROPONINI 0.12 (H) 05/14/2021    TROPONINI 0.22 (H) 02/15/2021    TROPONINI 0.13 (H) 12/26/2020       Notable Cultures:      Blood cultures   Blood Culture, Routine   Date Value Ref Range Status   03/28/2022 24 Hours no growth  Preliminary     Respiratory cultures No results found for: RESPCULTURE   Gram Stain Result   Date Value Ref Range Status   12/08/2021 Refer to ordered Gram stain for results  Final     Urine   Urine Culture, Routine   Date Value Ref Range Status   03/27/2022 Growth not present  Final     Legionella No results found for: LABLEGI  C Diff PCR No results found for: CDIFPCR  Wound culture/abscess: No results for input(s): WNDABS in the last 72 hours.   Tip culture:  Recent Labs     03/31/22  0432   CXCATHTIP Growth present, evaluating for:  Gram positive organism          Antibiotic  Days  Day started                                Oxygen:     Vent Information  $Ventilation: Off Vent  Skin Assessment: Clean, dry, & intact  Equipment ID: [id number]  Vent Type: [phone]  Vent Mode: (S) PS  Vt Ordered: 0 mL  Rate Set: 0 bmp  Peak Flow: 0 L/min  Pressure Support: 8 cmH20  FiO2 : 0 %  SpO2: 98 %  SpO2/FiO2 ratio: 250  Sensitivity: 3  PEEP/CPAP: 5  I Time/ I Time %: 0 s  Humidification Source: Heated wire  Humidification Temp: 37  Humidification Temp Measured: 37  Circuit Condensation: Drained  Additional Respiratory  Assessments  Pulse: 89  Resp: 13  SpO2: 98 %  End Tidal CO2: 29 (%)  Humidification Source: Heated wire  Humidification Temp: 37  Circuit Condensation: Drained  Oral Care: Teeth brushed  Subglottic Suction Done?: Yes  Airway Type: ET  Airway Size: 7.5  Cuff Pressure (cm H2O): 29 cm H2O     Nasal cannula L/min     Face mask %     Reservoirs mask %       ABG   Vent Settings     PH   Mode      PCO2   TV      PO2   RR      HCO3   PS      Sat%   PEEP      FIO2   FIO2        P/F          Lines:  Site  Day  Date inserted     TLC              PICC              Arterial line              Peripheral line              HD cath            [REMOVED] Urethral Catheter Straight-tip 16 fr-Output (mL): 5 mL  [REMOVED] External Urinary Catheter-Output (mL): 5 mL      Brief Summary: The patient is a 36 y.o. female with PMH of uncontrolled T1DM (Hgb A1c 7.7%), ESRD on HD, hypothyroidism, prior endocarditis w/ septic emboli, anemia of chronic disease, prior MDRO UTI, and MRSA infection, who was brought in to the ED for acute encephalopathy. She was found to be lethargic and encephalopathic by employees of her SNF. POCT BG was collected, which read as too low to quantify on the meter. EMS was called, and the pt was transported to [facility] ED. On arrival to the ED, the pt was obtunded and unable to answer questions/follow commands. D/t concern for respiratory compromise and inability to protect her airway, she was urgently intubated for airway protection. Others work up in the ED, Hb 6.5, BNP 70K, troponin 206, CT shows small bowel wall thickening. Started on Vanc and zoysen and started in 100 cc/h. For further care and management she was transferred to MICU    Assessment:     Active problems   1. Acute Metabolic Encephalopathy 2/2 hypoglycemia vs stroke vs seizure, resolved   2. Acute respiratory failure secondary to altered mental status. Concern for Aspiration pneumonia versus pneumonitis, on Zosyn. S/P intubation  3. Brittle DMT1, labile blood sugar   4. DKA, transient  5.  Extremely sensitive to insulin  6. Elevated Troponin 2/2 end-stage renal disease  7. Elevated proBNP secondary to ESRD disease  8. Prolong QTc   9. Hx Chronic c. Diff, on metronidazole per ID  10. Hx CKD 2/2 to DM. on dialysis TTS  11. Acute on chronic Anemia of Chronic Disease, s/p 1 unit of blood. Stable     Inactive problems:  12. Hx Septic Emboli  13. Hx of anxiety and depression   14. Hx Vitamin D deficiency  15. Hx of hypertension  16. Hx of COVID-19 pneumonia  17. Hx of MDRO UTI  18. Hx of Gastroparesis   19. Hx of hypothyroidism    20. Hx of restless leg syndrome     Plan:   · Continue 1 unit of lantus and continue modified SSI    · Monitor vitals and blood sugar 4 times daily and nightly   · If BG running low, start on D5 half normal saline   · Continue to wean hydrocortisone   · Monitor H and H q12, holding anticoagulation   · Greatly appreciate Endo, ID and Nephro recommendations   · Follow tip culture   · For transfer     # Code Status: Full   # Peptic ulcer prophylaxis: Pepcid   # DVT Prophylaxis: PCD  # Disposition: Tranfer out     Cecil Santos MD, PGY-2  Internal medicine resident  Attending Physician: Dr. Magdalene Burns     I personally saw, examined and provided care for the patient. Radiographs, labs and medication list were reviewed by me independently. I spoke with bedside nursing, therapists and consultants. Critical care services and times documented are independent of procedures and multidisciplinary rounds with Residents. Additionally comprehensive, multidisciplinary rounds were conducted with the MICU team. The case was discussed in detail and plans for care were established. Review of Residents documentation was conducted and revisions were made as appropriate. I agree with the above documented exam, problem list and plan of care.   Rachel Kinney MD   CCT excluding procedures:33'

## 2022-04-01 NOTE — PLAN OF CARE
Problem: Skin Integrity:  Goal: Will show no infection signs and symptoms  Description: Will show no infection signs and symptoms  Outcome: Met This Shift  Goal: Absence of new skin breakdown  Description: Absence of new skin breakdown  Outcome: Met This Shift     Problem: Diarrhea:  Goal: Bowel elimination is within specified parameters  Description: Bowel elimination is within specified parameters  Outcome: Met This Shift  Goal: Passage of soft, formed stool  Description: Passage of soft, formed stool  Outcome: Met This Shift  Goal: Establishment of normal bowel function will improve to within specified parameters  Description: Establishment of normal bowel function will improve to within specified parameters  Outcome: Met This Shift     Problem: Pain:  Goal: Pain level will decrease  Description: Pain level will decrease  Outcome: Met This Shift  Goal: Control of acute pain  Description: Control of acute pain  Outcome: Met This Shift

## 2022-04-01 NOTE — PROGRESS NOTES
[facility]  Internal Medicine Residency Program  Progress Note - House Team     Patient:  Sharri Peguero 36 y.o. female MRN: [id number]     Date of Service: 4/1/2022     CC: AMS  Overnight events: NAEO    Subjective     Patient was seen and examined this morning at bedside in no acute distress. Overnight no acute events documented. VSS stable, afebrile. Patient continues to complains of loose watery diarrhea. Midline in place. She is also complaining of some mild GI upset. No other acute complaints this time. Objective     Physical Exam:  · Vitals: BP (!) 150/96   Pulse 85   Temp 97.4 °F (36.3 °C) (Axillary)   Resp 16   Ht 5' 4\" (1.626 m)   Wt 142 lb 3.2 oz (64.5 kg)   SpO2 99%   BMI 24.41 kg/m²     · I & O - 24hr: No intake/output data recorded. · General Appearance: lethargic  · HEENT:  Head: Normocephalic, no lesions, without obvious abnormality. · Neck: no adenopathy, no carotid bruit, no JVD, supple, symmetrical, trachea midline and thyroid not enlarged, symmetric, no tenderness/mass/nodules  · Lung: clear to auscultation bilaterally  · Heart: regular rate and rhythm, S1, S2 normal, no murmur, click, rub or gallop  · Abdomen: soft, non-tender; bowel sounds normal; no masses,  no organomegaly  · Extremities:  extremities normal, atraumatic, no cyanosis or edema, midline RUE  · Musculokeletal: No joint swelling, no muscle tenderness. ROM normal in all joints of extremities. · Neurologic: Mental status: Sedated; grossly nonfocal, brainstem reflexes intact.   Subject  Pertinent Labs & Imaging Studies   jorge alberto  CBC with Differential:    Lab Results   Component Value Date    WBC 8.9 04/01/2022    RBC 2.74 04/01/2022    HGB 7.0 04/01/2022    HCT 22.1 04/01/2022     04/01/2022    MCV 80.7 04/01/2022    MCH 25.5 04/01/2022    MCHC 31.7 04/01/2022    RDW 18.1 04/01/2022    NRBC 0.9 03/28/2022    SEGSPCT 67 06/27/2013    METASPCT 1.7 08/10/2020    LYMPHOPCT 4.9 04/01/2022 MONOPCT 0.9 04/01/2022    MYELOPCT 0.9 01/19/2022    BASOPCT 0.0 04/01/2022    MONOSABS 0.09 04/01/2022    LYMPHSABS 0.00 04/01/2022    EOSABS 0.00 04/01/2022    BASOSABS 0.00 04/01/2022     BMP:    Lab Results   Component Value Date     04/01/2022    K 3.8 04/01/2022    K 3.7 03/04/2022     04/01/2022    CO2 27 04/01/2022    BUN 13 04/01/2022    LABALBU 2.4 04/01/2022    LABALBU 4.1 05/18/2012    CREATININE 1.9 04/01/2022    CALCIUM 7.8 04/01/2022    GFRAA 38 04/01/2022    LABGLOM 38 04/01/2022    GLUCOSE 172 04/01/2022    GLUCOSE 130 05/18/2012       XR CHEST PORTABLE   Final Result   1. The lungs are clear. There is no infiltrate or effusion. 2. Stable position of the support lines and tubes. XR CHEST PORTABLE   Final Result   1. There is no acute cardiopulmonary disease   2. Stable position of the support lines and tubes. XR CHEST PORTABLE   Final Result   Infiltrate and or atelectasis at the left lower lobe. Substantially resolved   infiltrate and or atelectasis on the right. New NG tube. CT HEAD WO CONTRAST   Final Result   No acute intracranial abnormality or hemorrhage. CT ABDOMEN PELVIS WO CONTRAST Additional Contrast? None   Final Result   1. Moderate ascites throughout abdomen and pelvis. 2.  Multiple thick walled loops of small bowel throughout the abdomen could   suggest nonspecific infectious or inflammatory enteritis. 3.  Generalized body wall edema. 4.  Small bilateral pleural effusions with adjacent atelectatic changes. XR ABDOMEN FOR NG/OG/NE TUBE PLACEMENT   Final Result   Enteric tube tip in the body of the stomach. XR CHEST PORTABLE   Final Result   Right perihilar opacity. XR CHEST PORTABLE    (Results Pending)        Resident's Assessment and Plan     Assessment:    1.  Acute metabolic encephalopathy-likely due to hypoglycemia in setting of DM1 and ESRD (CTH negative, negative UDS/SDS, NH3 normal, B12 normal 1 month ago)  2. HTN  3. Acute hypoxic respiratory failure  4. Aspiration pneumonia  5. ESRD on HD TTS  6. IDDM1-A1c 7.7% this admission  7. Hypothyroidism-TSH 5.37, FT4 1.10 normal  8. Acute on chronic anemia-s/p 1 unit PRBCs, chronic component likely due to ESRD  9. History of MDRO UTI  10.  History of C. difficile infection      Plan:     Wean hydrocortisone   Decrease Lantus to 1 unit nightly   Continue modified low-dose sliding scale   Close glucose monitoring every 4 hours   Continue Synthroid   Oral Flagyl per ID   HD TTS per nephro   Continue Abilify and Celexa   Trial GI cocktail   Will get FOBT due to ongoing anemia   Discharge planning, back to facility, pre-cert initiated      PT/OT evaluation: Ordered  DVT prophylaxis/ GI prophylaxis: Heparin/Protonix  Disposition: continue current care    Deborah Gibson MD, PGY-3  Attending physician: Dr. Jose Alfredo Moreno

## 2022-04-01 NOTE — PROGRESS NOTES
Department of Internal Medicine  Infectious Diseases  Progress  Note      C/C : Aspiration pneumonia     Pt is extubated   Awake and alert  Denies fever or chills  Reports SOB   Afebrile       Current Facility-Administered Medications   Medication Dose Route Frequency Provider Last Rate Last Admin    hydrocortisone sodium succinate PF (SOLU-CORTEF) injection 50 mg  50 mg IntraVENous Daily Ron Lay MD   50 mg at 04/01/22 0919    aluminum & magnesium hydroxide-simethicone (MAALOX) 30 mL, lidocaine viscous hcl (XYLOCAINE) 5 mL (GI COCKTAIL)   Oral Once Terri Thy Boy Dorsey MD        insulin lispro (HUMALOG) injection vial 0-3 Units  0-3 Units SubCUTAneous 4x Daily AC & HS Zeke Swann MD        0.9 % sodium chloride infusion   IntraVENous PRN Delfina Rodriguez DO        sevelamer (RENVELA) tablet 800 mg  800 mg Oral TID  HEBER Banks - CNP   800 mg at 04/01/22 0917    metroNIDAZOLE (FLAGYL) tablet 500 mg  500 mg Oral 3 times per day Mayte Blakely MD   500 mg at 04/01/22 4077    sodium chloride flush 0.9 % injection 5-40 mL  5-40 mL IntraVENous 2 times per day Ron Lay MD   10 mL at 04/01/22 0931    sodium chloride flush 0.9 % injection 5-40 mL  5-40 mL IntraVENous PRN Ron Lay MD        0.9 % sodium chloride infusion   IntraVENous PRN Ron Lay MD        heparin flush 100 UNIT/ML injection 100 Units  1 mL IntraVENous 2 times per day Ron Lay MD   100 Units at 04/01/22 0918    heparin flush 100 UNIT/ML injection 100 Units  1 mL IntraCATHeter PRN Ron Lay MD        insulin glargine-yfgn [facility]) injection vial 1 Units  1 Units SubCUTAneous Nightly Ron Lay MD   1 Units at 03/31/22 2102    ondansetron (ZOFRAN) injection 4 mg  4 mg IntraVENous Q6H PRN Venita Lynn MD   4 mg at 04/01/22 1741    white petrolatum ointment   Topical BID PRN Ron Lay MD        melatonin disintegrating tablet 5 mg  5 mg Oral Nightly Jacinda L Annetta DO   5 mg at 03/31/22 2102    dilTIAZem (CARDIZEM) tablet 90 mg  90 mg Oral Q6H Chelsie Agarwal MD   90 mg at 04/01/22 0630    mirtazapine (REMERON) tablet 15 mg  15 mg Oral Nightly Jacinda L Littlejohn, DO   15 mg at 03/31/22 2102    escitalopram (LEXAPRO) tablet 10 mg  10 mg Oral Daily Jacinda L Littlejohn, DO   10 mg at 04/01/22 6511    ARIPiprazole (ABILIFY) tablet 5 mg  5 mg Oral Nightly Jacinda L Littlejohn, DO   5 mg at 03/31/22 2101    rOPINIRole (REQUIP) tablet 0.25 mg  0.25 mg Oral Nightly Jacinda L Littlejohn, DO   0.25 mg at 03/31/22 2101    polyethylene glycol (GLYCOLAX) packet 17 g  17 g Oral Daily PRN Garon Merry, DO        acetaminophen (TYLENOL) tablet 650 mg  650 mg Oral Q6H PRN Garon Merry, DO   650 mg at 04/01/22 9951    Or    acetaminophen (TYLENOL) suppository 650 mg  650 mg Rectal Q6H PRN Jacinda L Littlejohn, DO        glucose (GLUTOSE) 40 % oral gel 15 g  15 g Oral PRN Jacinda L Littlejohn, DO        dextrose 50 % IV solution  12.5 g IntraVENous PRN Jacinda L Littlejohn, DO   12.5 g at 03/28/22 0358    glucagon (rDNA) injection 1 mg  1 mg IntraMUSCular PRN Jacinda L Littlejohn, DO        dextrose 5 % solution  100 mL/hr IntraVENous PRN Jacinda L Littlejohn, DO        albuterol (PROVENTIL) nebulizer solution 2.5 mg  2.5 mg Nebulization Q6H PRN Christa Gutierrez MD        levothyroxine (SYNTHROID) tablet 88 mcg  88 mcg Oral Daily Willie Ortiz MD   88 mcg at 04/01/22 3971    famotidine (PEPCID) tablet 10 mg  10 mg Oral Daily Ingris Cronin MD   10 mg at 04/01/22 0919    epoetin nena-epbx (RETACRIT) injection 3,000 Units  3,000 Units SubCUTAneous Once per day on Mon Wed Fri [facility] Zachariah, APRN - CNP   3,000 Units at 04/01/22 8630    Vitamin D (CHOLECALCIFEROL) tablet 1,000 Units  1,000 Units Per NG tube Daily Rachel Soni APRN - CNP   1,000 Units at 04/01/22 0919    labetalol (NORMODYNE;TRANDATE) injection 5 mg  5 mg IntraVENous Q6H PRN Solange Winkler MD           REVIEW OF SYSTEMS:      CONSTITUTIONAL: Denies fever or chills   HEENT: Denies sore throat   RESPIRATORY: SOB   CARDIOVASCULAR:  Denies palpitation  GASTROINTESTINAL: Denies abdomen pain , no diarrhea   GENITOURINARY:  ESRD on HD   INTEGUMENT: No rash    HEMATOLOGIC/LYMPHATIC:  No bleeding . MUSCULOSKELETAL:  Denies leg swelling    NEUROLOGICAL:  denies loss of consciousness or weakness        PHYSICAL EXAM:      Vitals:     BP (!) 156/97   Pulse 97   Temp 97.4 °F (36.3 °C) (Oral)   Resp 10   Ht 5' 4\" (1.626 m)   Wt 142 lb 3.2 oz (64.5 kg)   SpO2 98%   BMI 24.41 kg/m²       General Appearance:    Extubated, awake , alert, no distress    Head:    Normocephalic, atraumatic   Eyes:    +  pallor, no icterus,   Ears:    No obvious deformity or drainage.    Nose:   No nasal drainage   Throat:   Mucosa moist    Neck:   Supple, no lymphadenopathy   Lungs:     Crackles +   Heart:    Tachycardia    Abdomen:     Soft, non-tender, bowel sounds present    Extremities:   No edema,   Pulses:   Dorsalis pedis palpable    Skin:   no rashes , right chest Tesio catheter    CBC with Differential:      Lab Results   Component Value Date    WBC 8.9 04/01/2022    RBC 2.74 04/01/2022    HGB 7.0 04/01/2022    HCT 22.1 04/01/2022     04/01/2022    MCV 80.7 04/01/2022    MCH 25.5 04/01/2022    MCHC 31.7 04/01/2022    RDW 18.1 04/01/2022    NRBC 0.9 03/28/2022    SEGSPCT 67 06/27/2013    METASPCT 1.7 08/10/2020    LYMPHOPCT 4.9 04/01/2022    MONOPCT 0.9 04/01/2022    MYELOPCT 0.9 01/19/2022    BASOPCT 0.0 04/01/2022    MONOSABS 0.09 04/01/2022    LYMPHSABS 0.00 04/01/2022    EOSABS 0.00 04/01/2022    BASOSABS 0.00 04/01/2022       CMP     Lab Results   Component Value Date     04/01/2022    K 3.8 04/01/2022    K 3.7 03/04/2022     04/01/2022    CO2 27 04/01/2022    BUN 13 04/01/2022    CREATININE 1.9 04/01/2022    GFRAA 38 04/01/2022    LABGLOM 38 04/01/2022    GLUCOSE 172 04/01/2022    GLUCOSE 130 05/18/2012    PROT 6.3 04/01/2022    LABALBU 2.4 04/01/2022    LABALBU 4.1 05/18/2012    CALCIUM 7.8 04/01/2022    BILITOT 0.2 04/01/2022    ALKPHOS 173 04/01/2022    AST 29 04/01/2022    ALT 26 04/01/2022         Hepatic Function Panel:    Lab Results   Component Value Date    ALKPHOS 173 04/01/2022    ALT 26 04/01/2022    AST 29 04/01/2022    PROT 6.3 04/01/2022    BILITOT 0.2 04/01/2022    BILIDIR <0.2 04/01/2022    IBILI see below 04/01/2022    LABALBU 2.4 04/01/2022    LABALBU 4.1 05/18/2012       PT/INR:    Lab Results   Component Value Date    PROTIME 12.1 03/31/2022    PROTIME 13.6 08/14/2011    INR 1.1 03/31/2022       TSH:    Lab Results   Component Value Date    TSH 5.370 03/28/2022       U/A:    Lab Results   Component Value Date    COLORU Yellow 03/28/2022    PHUR 8.5 03/28/2022    LABCAST RARE 01/12/2020    WBCUA >20 03/28/2022    WBCUA 0-1 05/18/2012    RBCUA 5-10 03/28/2022    RBCUA 1-3 08/01/2013    MUCUS Present 02/12/2016    TRICHOMONAS Present 07/17/2020    YEAST RARE 05/24/2018    BACTERIA MANY 03/28/2022    CLARITYU Clear 03/28/2022    SPECGRAV 1.015 03/28/2022    LEUKOCYTESUR LARGE 03/28/2022    UROBILINOGEN 0.2 03/28/2022    BILIRUBINUR Negative 03/28/2022    BILIRUBINUR SMALL 05/18/2012    BLOODU MODERATE 03/28/2022    GLUCOSEU 100 03/28/2022    GLUCOSEU >=1000 05/18/2012    AMORPHOUS RARE 11/01/2019       ABG:    Lab Results   Component Value Date    LPO5VGM 14.0 02/15/2021    R2IXZOZK 97.7 09/08/2012    PHART 7.345 07/20/2012    EQE1OMY 35.0 10/29/2019    PO2ART 91.5 10/29/2019       MICROBIOLOGY:    Blood culture - neg to date   Sputum -  Specimen: Sputum, Suctioned Updated: 03/29/22 0826      CULTURE, RESPIRATORY Oral Pharyngeal Celine present    Smear, Respiratory --    Few Polymorphonuclear leukocytes   Rare Epithelial cells   Few Gram positive coccobacillary organisms    Narrative:             Radiology :    Chest X ray : LLL infiltrates       IMPRESSION:     1.  Aspiration pneumonia, elevated procalcitonin, leukocytosis   2. Resp failure - improved    3. Hx of recurrent C diff infection     RECOMMENDATIONS:      1. Flagyl  po 500 mg q 8 hrs ( hx of recurrent C diff infection )  X 3 days    2.  D/C isolation

## 2022-04-01 NOTE — PROGRESS NOTES
Physical Therapy    Physical Therapy Daily Treatment Note       Name: Gordon Matias  : 1990  MRN: [id number]      Date of Service: 2022    Evaluating PT:  Ky Guadalupe PT, DPT  [id number]     Room #:  5105/8342-B  Diagnosis:  Altered mental status [D88.74]  Acute metabolic encephalopathy [F64.13]  PMHx/PSHx:  Acute CHF, cardiac arrest, DM, diabetic gastroparesis, diabetic ketoacidosis, diabetic polyneuropathy, ESRD on HD, HLD, MDRO, chronic Cdiff  Procedure/Surgery:  Intubated 3/27, extubated 3/30   Precautions:  Falls, Contact isolation  Equipment Needs:  NA    SUBJECTIVE:    Pt admitted from nursing facility. Ambulates with FWW vs. W/C and assistance from staff. Pt plans to return home with her mom in a 2 story home with 4 steps to enter and first floor set-up. OBJECTIVE:   Initial Evaluation  Date: 3/31/22 Treatment  22 Short Term/ Long Term   Goals   AM-PAC 6 Clicks 34/44 72/35    Was pt agreeable to Eval/treatment? Yes  Yes     Does pt have pain?  10 HA Not reported     Bed Mobility  Rolling: SBA  Supine to sit: Min A  Sit to supine: NT  Scooting: SBA Rolling: SBA  Supine to sit: SBA  Sit to supine: NT  Scooting: SBA Modified Independent     Transfers Sit to stand: Min A from bed, Max A from toilet  Stand to sit: Min A  Stand pivot: Min A with FWW  Sit to stand: Min A from bed, Mod A from toilet   Stand to sit: Min A  Stand pivot: Min A with no AD Modified Independent with FWW   Ambulation    20 feet x2 with FWW Min A    Limited by contact isolation room 20 feet x2 with no AD Min A >150 feet with FWW Modified Independent     Stair negotiation: ascended and descended  NT NT >4 steps with 1 rail Modified Independent     ROM BUE:  Per OT eval  BLE:  WFL     Strength BUE:  Per OT eval  BLE:  WFL     Balance Sitting EOB:  SBA  Dynamic Standing:  Min A Sitting EOB:  SBA  Dynamic Standing:  Min A Sitting EOB:  Independent   Dynamic Standing:  Modified Independent       Pt is A & O x 4  RASS: 0  CAM-ICU:  NT  Sensation:  Pt denies numbness and tingling to extremities  Edema:  Unremarkable    Vitals:  Blood Pressure at rest 156/97 mmHg  Blood Pressure post session - mmHg   Heart Rate at rest 98 bpm  Heart Rate post session - bpm    SPO2 at rest 98% on RA SPO2 post session 97% on RA         Functional Status Score-Intensive Care Unit (FSS-ICU)   Rolling 5/7   Supine to sit transfer 5/7   Unsupported sitting  5/7   Sit to stand transfers 4/7   Ambulation 1/7   Total  20/35     Therapeutic Exercises:    BLE ROM  STS x2 reps from bed  STS x6 reps from chair    Patient education  Pt educated on PT role, safety during functional mobility    Patient response to education:   Pt verbalized understanding Pt demonstrated skill Pt requires further education in this area   Yes  Yes  no     ASSESSMENT:    Conditions Requiring Skilled Therapeutic Intervention:    [x]Decreased strength     []Decreased ROM  [x]Decreased functional mobility  [x]Decreased balance   [x]Decreased endurance   []Decreased posture  []Decreased sensation  []Decreased coordination   []Decreased vision  []Decreased safety awareness   []Increased pain       Comments:  Pt received supine and agreeable to PT treatment with OT collaboration. Pt cleared for participation by RN prior to session. Vitals monitored during session. Pt assisted to EOB and to bedside chair. Pt declined use of assistive device today. Able to stand and ambulate to bathroom for standing ADL training and dynamic balance training with minimal assistance. Returned to chair. Ambulation distance limited d/t tight contact isolation room. Performed x5  STS from chair with emphasis on LE extension strengthening and slow, controlled eccentric lowering. Pt left with call button in reach, lines attached, and needs met. Discussed with RN.   Discussed pt case at interdisciplinary rounds in AM.     Treatment:  Patient practiced and was instructed in the following treatment:     Bed mobility training - pt given verbal and tactile cues to facilitate proper sequencing and safety during rolling and supine>sit as well as provided with physical assistance to complete task    STS and pivot transfer training - pt educated on proper hand and foot placement, safety and sequencing, and use of FWW to safely complete sit<>stand and pivot transfers with hands on assistance to complete task safely    Gait training- pt was given verbal and tactile cues to facilitate safety/balance during ambulation as well as provided with physical assistance. PHYSICAL THERAPY PLAN OF CARE:  Pt is making progress towards established goals. Continue PT POC.      Specific instructions for next treatment:  Progress gait, trial stairs as able     Time in  1010  Time out  1040    Total Treatment Time  30 minutes     CPT codes:  [] Low Complexity PT evaluation 68817  [] Moderate Complexity PT evaluation 23991  [] High Complexity PT evaluation 13992  [] PT Re-evaluation 47605  [] Gait training 17282 -- minutes  [] Manual therapy 74721 -- minutes  [x] Therapeutic activities 82883 30 minutes  [] Therapeutic exercises 57661 - minutes  [] Neuromuscular reeducation 12232 -- minutes     Ashli Brown, PT, DPT  [id number]

## 2022-04-01 NOTE — PLAN OF CARE
Problem: Skin Integrity:  Goal: Will show no infection signs and symptoms  Description: Will show no infection signs and symptoms  4/1/2022 1807 by Lesly Landry RN  Outcome: Met This Shift     Problem: Skin Integrity:  Goal: Absence of new skin breakdown  Description: Absence of new skin breakdown  4/1/2022 1807 by Lesly Landry RN  Outcome: Met This Shift     Problem: Serum Glucose Level - Abnormal:  Goal: Ability to maintain appropriate glucose levels has stabilized  Description: Ability to maintain appropriate glucose levels has stabilized  Outcome: Met This Shift     Problem: Breathing Pattern - Ineffective:  Goal: Ability to achieve and maintain a regular respiratory rate will improve  Description: Ability to achieve and maintain a regular respiratory rate will improve  Outcome: Met This Shift     Problem: Cardiac Output - Decreased:  Goal: Hemodynamic stability will improve  Description: Hemodynamic stability will improve  Outcome: Met This Shift     Problem: Diarrhea:  Goal: Bowel elimination is within specified parameters  Description: Bowel elimination is within specified parameters  4/1/2022 1807 by Lesly Landry RN  Outcome: Met This Shift     Problem: Diarrhea:  Goal: Passage of soft, formed stool  Description: Passage of soft, formed stool  4/1/2022 1807 by Lesly Landry RN  Outcome: Met This Shift     Problem: Diarrhea:  Goal: Establishment of normal bowel function will improve to within specified parameters  Description: Establishment of normal bowel function will improve to within specified parameters  4/1/2022 1807 by Lesly Landry RN  Outcome: Met This Shift     Problem: Pain:  Goal: Pain level will decrease  Description: Pain level will decrease  4/1/2022 1807 by Lesly Landry RN  Outcome: Met This Shift     Problem: Pain:  Goal: Control of acute pain  Description: Control of acute pain  4/1/2022 1807 by Lesly Landry RN  Outcome: Met This Shift     Problem: Pain:  Goal: Control of chronic pain  Description: Control of chronic pain  Outcome: Met This Shift

## 2022-04-02 LAB
ADRENOCORTICOTROPIC HORMONE: <1.5 PG/ML (ref 7.2–63.3)
ALBUMIN SERPL-MCNC: 2.6 G/DL (ref 3.5–5.2)
ALP BLD-CCNC: 172 U/L (ref 35–104)
ALT SERPL-CCNC: 26 U/L (ref 0–32)
ANION GAP SERPL CALCULATED.3IONS-SCNC: 10 MMOL/L (ref 7–16)
ANION GAP SERPL CALCULATED.3IONS-SCNC: 6 MMOL/L (ref 7–16)
AST SERPL-CCNC: 28 U/L (ref 0–31)
BASOPHILS ABSOLUTE: 0.01 E9/L (ref 0–0.2)
BASOPHILS RELATIVE PERCENT: 0.2 % (ref 0–2)
BILIRUB SERPL-MCNC: <0.2 MG/DL (ref 0–1.2)
BILIRUBIN DIRECT: <0.2 MG/DL (ref 0–0.3)
BILIRUBIN, INDIRECT: ABNORMAL MG/DL (ref 0–1)
BLOOD CULTURE, ROUTINE: NORMAL
BUN BLDV-MCNC: 24 MG/DL (ref 6–20)
BUN BLDV-MCNC: 8 MG/DL (ref 6–20)
CALCIUM IONIZED: 1.18 MMOL/L (ref 1.15–1.33)
CALCIUM SERPL-MCNC: 6.5 MG/DL (ref 8.6–10.2)
CALCIUM SERPL-MCNC: 7.6 MG/DL (ref 8.6–10.2)
CHLORIDE BLD-SCNC: 101 MMOL/L (ref 98–107)
CHLORIDE BLD-SCNC: 110 MMOL/L (ref 98–107)
CO2: 24 MMOL/L (ref 22–29)
CO2: 26 MMOL/L (ref 22–29)
CREAT SERPL-MCNC: 1.3 MG/DL (ref 0.5–1)
CREAT SERPL-MCNC: 2.7 MG/DL (ref 0.5–1)
CULTURE CATHETER TIP: ABNORMAL
CULTURE, BLOOD 2: NORMAL
EOSINOPHILS ABSOLUTE: 0.04 E9/L (ref 0.05–0.5)
EOSINOPHILS RELATIVE PERCENT: 0.8 % (ref 0–6)
GFR AFRICAN AMERICAN: 25
GFR AFRICAN AMERICAN: 58
GFR NON-AFRICAN AMERICAN: 25 ML/MIN/1.73
GFR NON-AFRICAN AMERICAN: 58 ML/MIN/1.73
GLUCOSE BLD-MCNC: 171 MG/DL (ref 74–99)
GLUCOSE BLD-MCNC: 260 MG/DL (ref 74–99)
HCT VFR BLD CALC: 23.2 % (ref 34–48)
HEMOGLOBIN: 7.2 G/DL (ref 11.5–15.5)
IMMATURE GRANULOCYTES #: 0.03 E9/L
IMMATURE GRANULOCYTES %: 0.6 % (ref 0–5)
LYMPHOCYTES ABSOLUTE: 0.86 E9/L (ref 1.5–4)
LYMPHOCYTES RELATIVE PERCENT: 16.2 % (ref 20–42)
MAGNESIUM: 2.2 MG/DL (ref 1.6–2.6)
MCH RBC QN AUTO: 25.3 PG (ref 26–35)
MCHC RBC AUTO-ENTMCNC: 31 % (ref 32–34.5)
MCV RBC AUTO: 81.4 FL (ref 80–99.9)
METER GLUCOSE: 206 MG/DL (ref 74–99)
METER GLUCOSE: 260 MG/DL (ref 74–99)
MONOCYTES ABSOLUTE: 0.24 E9/L (ref 0.1–0.95)
MONOCYTES RELATIVE PERCENT: 4.5 % (ref 2–12)
NEUTROPHILS ABSOLUTE: 4.14 E9/L (ref 1.8–7.3)
NEUTROPHILS RELATIVE PERCENT: 77.7 % (ref 43–80)
ORGANISM: ABNORMAL
PDW BLD-RTO: 17.9 FL (ref 11.5–15)
PHOSPHORUS: 3.1 MG/DL (ref 2.5–4.5)
PLATELET # BLD: 239 E9/L (ref 130–450)
PMV BLD AUTO: 10.6 FL (ref 7–12)
POTASSIUM SERPL-SCNC: 3.1 MMOL/L (ref 3.5–5)
POTASSIUM SERPL-SCNC: 3.9 MMOL/L (ref 3.5–5)
RBC # BLD: 2.85 E12/L (ref 3.5–5.5)
RENIN ACTIVITY: <0.1 NG/ML/HR
SODIUM BLD-SCNC: 137 MMOL/L (ref 132–146)
SODIUM BLD-SCNC: 140 MMOL/L (ref 132–146)
TOTAL PROTEIN: 6.4 G/DL (ref 6.4–8.3)
WBC # BLD: 5.3 E9/L (ref 4.5–11.5)

## 2022-04-02 PROCEDURE — 85025 COMPLETE CBC W/AUTO DIFF WBC: CPT

## 2022-04-02 PROCEDURE — 6370000000 HC RX 637 (ALT 250 FOR IP): Performed by: NURSE PRACTITIONER

## 2022-04-02 PROCEDURE — 6360000002 HC RX W HCPCS: Performed by: INTERNAL MEDICINE

## 2022-04-02 PROCEDURE — 6370000000 HC RX 637 (ALT 250 FOR IP): Performed by: STUDENT IN AN ORGANIZED HEALTH CARE EDUCATION/TRAINING PROGRAM

## 2022-04-02 PROCEDURE — 90935 HEMODIALYSIS ONE EVALUATION: CPT

## 2022-04-02 PROCEDURE — 80076 HEPATIC FUNCTION PANEL: CPT

## 2022-04-02 PROCEDURE — 6370000000 HC RX 637 (ALT 250 FOR IP): Performed by: INTERNAL MEDICINE

## 2022-04-02 PROCEDURE — 83735 ASSAY OF MAGNESIUM: CPT

## 2022-04-02 PROCEDURE — 80048 BASIC METABOLIC PNL TOTAL CA: CPT

## 2022-04-02 PROCEDURE — S5553 INSULIN LONG ACTING 5 U: HCPCS | Performed by: INTERNAL MEDICINE

## 2022-04-02 PROCEDURE — 2580000003 HC RX 258: Performed by: INTERNAL MEDICINE

## 2022-04-02 PROCEDURE — 84100 ASSAY OF PHOSPHORUS: CPT

## 2022-04-02 PROCEDURE — 82330 ASSAY OF CALCIUM: CPT

## 2022-04-02 PROCEDURE — 82962 GLUCOSE BLOOD TEST: CPT

## 2022-04-02 PROCEDURE — 99232 SBSQ HOSP IP/OBS MODERATE 35: CPT | Performed by: INTERNAL MEDICINE

## 2022-04-02 PROCEDURE — 2060000000 HC ICU INTERMEDIATE R&B

## 2022-04-02 RX ORDER — POTASSIUM CHLORIDE 20 MEQ/1
40 TABLET, EXTENDED RELEASE ORAL ONCE
Status: COMPLETED | OUTPATIENT
Start: 2022-04-02 | End: 2022-04-02

## 2022-04-02 RX ORDER — INSULIN GLARGINE-YFGN 100 [IU]/ML
1 INJECTION, SOLUTION SUBCUTANEOUS NIGHTLY
Qty: 1 EACH | Refills: 1 | Status: CANCELLED | DISCHARGE
Start: 2022-04-02

## 2022-04-02 RX ORDER — FENTANYL CITRATE 50 UG/ML
12.5 INJECTION, SOLUTION INTRAMUSCULAR; INTRAVENOUS ONCE
Status: COMPLETED | OUTPATIENT
Start: 2022-04-02 | End: 2022-04-02

## 2022-04-02 RX ORDER — LOPERAMIDE HYDROCHLORIDE 2 MG/1
2 CAPSULE ORAL 4 TIMES DAILY PRN
Status: DISCONTINUED | OUTPATIENT
Start: 2022-04-02 | End: 2022-04-25 | Stop reason: HOSPADM

## 2022-04-02 RX ORDER — INSULIN GLARGINE-YFGN 100 [IU]/ML
2 INJECTION, SOLUTION SUBCUTANEOUS NIGHTLY
Status: DISCONTINUED | OUTPATIENT
Start: 2022-04-02 | End: 2022-04-04

## 2022-04-02 RX ADMIN — METRONIDAZOLE 500 MG: 500 TABLET ORAL at 06:26

## 2022-04-02 RX ADMIN — METRONIDAZOLE 500 MG: 500 TABLET ORAL at 21:30

## 2022-04-02 RX ADMIN — FAMOTIDINE 10 MG: 20 TABLET ORAL at 08:38

## 2022-04-02 RX ADMIN — LIDOCAINE HYDROCHLORIDE: 20 SOLUTION ORAL; TOPICAL at 22:15

## 2022-04-02 RX ADMIN — Medication 5 MG: at 21:02

## 2022-04-02 RX ADMIN — ESCITALOPRAM 10 MG: 10 TABLET, FILM COATED ORAL at 08:38

## 2022-04-02 RX ADMIN — METRONIDAZOLE 500 MG: 500 TABLET ORAL at 13:09

## 2022-04-02 RX ADMIN — HYDROCORTISONE SODIUM SUCCINATE 50 MG: 100 INJECTION, POWDER, FOR SOLUTION INTRAMUSCULAR; INTRAVENOUS at 08:39

## 2022-04-02 RX ADMIN — FENTANYL CITRATE 12.5 MCG: 50 INJECTION INTRAMUSCULAR; INTRAVENOUS at 23:30

## 2022-04-02 RX ADMIN — ONDANSETRON 4 MG: 2 INJECTION INTRAMUSCULAR; INTRAVENOUS at 02:15

## 2022-04-02 RX ADMIN — DILTIAZEM HYDROCHLORIDE 90 MG: 30 TABLET, FILM COATED ORAL at 18:21

## 2022-04-02 RX ADMIN — DILTIAZEM HYDROCHLORIDE 90 MG: 30 TABLET, FILM COATED ORAL at 13:09

## 2022-04-02 RX ADMIN — DILTIAZEM HYDROCHLORIDE 90 MG: 30 TABLET, FILM COATED ORAL at 06:30

## 2022-04-02 RX ADMIN — INSULIN LISPRO 1 UNITS: 100 INJECTION, SOLUTION INTRAVENOUS; SUBCUTANEOUS at 21:10

## 2022-04-02 RX ADMIN — LOPERAMIDE HYDROCHLORIDE 2 MG: 2 CAPSULE ORAL at 21:11

## 2022-04-02 RX ADMIN — MIRTAZAPINE 15 MG: 15 TABLET, FILM COATED ORAL at 21:02

## 2022-04-02 RX ADMIN — ROPINIROLE 0.25 MG: 0.25 TABLET, FILM COATED ORAL at 21:02

## 2022-04-02 RX ADMIN — INSULIN LISPRO 1 UNITS: 100 INJECTION, SOLUTION INTRAVENOUS; SUBCUTANEOUS at 10:57

## 2022-04-02 RX ADMIN — SEVELAMER CARBONATE 800 MG: 800 TABLET, FILM COATED ORAL at 08:38

## 2022-04-02 RX ADMIN — DILTIAZEM HYDROCHLORIDE 90 MG: 30 TABLET, FILM COATED ORAL at 02:07

## 2022-04-02 RX ADMIN — LOPERAMIDE HYDROCHLORIDE 2 MG: 2 CAPSULE ORAL at 03:29

## 2022-04-02 RX ADMIN — INSULIN GLARGINE-YFGN 2 UNITS: 100 INJECTION, SOLUTION SUBCUTANEOUS at 21:02

## 2022-04-02 RX ADMIN — SODIUM CHLORIDE, PRESERVATIVE FREE 100 UNITS: 5 INJECTION INTRAVENOUS at 08:39

## 2022-04-02 RX ADMIN — INSULIN LISPRO 1 UNITS: 100 INJECTION, SOLUTION INTRAVENOUS; SUBCUTANEOUS at 06:28

## 2022-04-02 RX ADMIN — Medication 1000 UNITS: at 08:38

## 2022-04-02 RX ADMIN — ARIPIPRAZOLE 5 MG: 5 TABLET ORAL at 21:02

## 2022-04-02 RX ADMIN — LEVOTHYROXINE SODIUM 88 MCG: 0.09 TABLET ORAL at 06:26

## 2022-04-02 RX ADMIN — Medication 10 ML: at 08:40

## 2022-04-02 RX ADMIN — ONDANSETRON 4 MG: 2 INJECTION INTRAMUSCULAR; INTRAVENOUS at 17:24

## 2022-04-02 RX ADMIN — Medication 10 ML: at 21:23

## 2022-04-02 RX ADMIN — POTASSIUM CHLORIDE 40 MEQ: 20 TABLET, EXTENDED RELEASE ORAL at 18:21

## 2022-04-02 RX ADMIN — SODIUM CHLORIDE, PRESERVATIVE FREE 100 UNITS: 5 INJECTION INTRAVENOUS at 21:03

## 2022-04-02 ASSESSMENT — PAIN SCALES - GENERAL
PAINLEVEL_OUTOF10: 0
PAINLEVEL_OUTOF10: 9

## 2022-04-02 ASSESSMENT — PAIN DESCRIPTION - DESCRIPTORS: DESCRIPTORS: DISCOMFORT;CRAMPING

## 2022-04-02 ASSESSMENT — PAIN DESCRIPTION - ORIENTATION: ORIENTATION: MID

## 2022-04-02 ASSESSMENT — PAIN DESCRIPTION - LOCATION: LOCATION: ABDOMEN

## 2022-04-02 ASSESSMENT — PAIN DESCRIPTION - ONSET: ONSET: ON-GOING

## 2022-04-02 ASSESSMENT — PAIN DESCRIPTION - FREQUENCY: FREQUENCY: INTERMITTENT

## 2022-04-02 ASSESSMENT — PAIN DESCRIPTION - PROGRESSION: CLINICAL_PROGRESSION: NOT CHANGED

## 2022-04-02 NOTE — DISCHARGE SUMMARY
[address]  Discharge Summary    PCP: Chance Real MD    Admit Date:3/27/2022  Discharge Date: 4/2/2022    Admission Diagnosis:   1. Acute metabolic encephalopathy - concern for symptomatic hypoglycemia   2. Respiratory distress, concern for aspiration pneumonia/pneumonitis   3. Elevated troponin in the setting of ESRD   4. Hx ESRD on HD   5. Hx brittle T1DM   6. Hx anemia of chronic disease   7. Hx HTN   8. Hx recent C. Diff infection   9. Hx Endocarditis w/ septic emboli formation    Discharge Diagnosis:  1. Acute metabolic encephalopathy likely 2/2 symptomatic hypoglycemia   2. Respiratory distress, concern for aspiration pneumonia/pneumonitis   3. Elevated troponin in the setting of ESRD   4. Hx ESRD on HD   5. Hx brittle T1DM surjit disease   6. Hx HTN   7. Hx recent C. Diff infection   8. Hx Endocarditis w/ septic emboli formation  9. Hx anemia of     Hospital Course: The pt is a 37 y/o female with a PMHx of brittle T1DM, ESRD on HD, HTN, recent C. Diff infection, and endocarditis w/ consequent septic emboli formation, who presented to the ED from her long-term care facility and was hospitalized on 3/28/2022. Her presenting complaint was acute encephalopathy. Per note review, the pt's LKW was 1100 on 3/28/2022. She was found lethartic and altered at approx. 1700 by NH staff, and subsequent POCT BG was too low to be read by the glucometer. EMS was called, and the pt was transported emergently to [address] [place] [address]. On arrival, she was intubated for airway protection, and was transferred to the MICU. On arrival, she was tachycardic, hypertensive, afebrile, O2 saturation 100% on the ventilator. Pro-BNP and troponin were elevated, and labs were also notable for anemia to 6.5 (baseline 6.0-8.0). UA showing pyuria and hematuria, moderate bacteria, and large LE. CXR showing possible infiltrate/edema, and CT abdomen/pelvis concerning for possible enteritis. CT head (-)ve. Once in the MICU, the pt was started on empiric antibiotic treatment for possible aspiration pneumonia/pneumonitis, as well as suspected enteritis. Hemodialysis was restarted, and Endocrinology was consulted for aid with managing extremely labile blood sugars. Infectious cultures were negative, and the patient's antimicrobial regimen was de-escalated to Flagyl q8hrs (end date 4/4/2022). Her blood sugars were controlled w/ 1U Lantus nightly and a modified sliding scale. She was successfully extubated, and has been tolerating room air well since that time. Currently, the pt is considered stabilized from a medical perspective. She will be discharged in stable condition to return to [facility]. All appropriate medication changes and outpatient lab work have been communicated to the patient, and all questions were answered prior to her discharge. Significant findings (history and exam, laboratory, radiological, pathology, other tests):   Vitals: /83   Pulse 86   Temp 95.8 °F (35.4 °C)   Resp (!) 7   Ht 5' 4\" (1.626 m)   Wt 142 lb 6.7 oz (64.6 kg)   SpO2 98%   BMI 24.45 kg/m²       · Constitutional: Sedated, intubated, awake and able to answer questions/follow commands appropriately. Follows commands. In no apparent distress. · Head: Normocephalic and atraumatic. · Eyes: EOMI, conjunctiva normal, (-) scleral icterus. Mucus membranes moist.  · Mouth: Mucus membranes moist. Oropharynx clear. No deviation of the tongue or uvula. Normal dentition. · Neck: (-)  swelling, (-) JVD, trachea midline  · Respiratory: ETT in place, lungs clear to auscultation bilaterally. (-)  wheezes, (-)  rales, (-)  rhonchi. No increased work of breathing or respiratory distress  · Cardiovascular: RRR. (-)  murmurs, (-) gallops,  (-) rubs. S1 and S2 were normal.   · GI:  Abdomen soft, non-tender, non-distended. (+) BS. (-) guarding, (-) rigidity.     · Extremities: Warm and well perfused. (-) clubbing, (-) cyanosis. 2+ distal pulses. (-) peripheral edema. (-)Sacral pitting edema. · Neurologic:  No focal neurological deficits. No tremor or overt seizure activity    Pending test results: N/A     Consults:  1. Endocrinology   2. Nephrology   3. Infectious Disease   4. Intensive Care     Procedures: N/A     Condition at discharge: Stable    Disposition: SNF    Discharge Medications:  Current Discharge Medication List      CONTINUE these medications which have NOT CHANGED    Details   escitalopram (LEXAPRO) 10 MG tablet Take 1 tablet by mouth daily  Qty: 30 tablet, Refills: 3      insulin glargine (LANTUS) 100 UNIT/ML injection vial Inject 5 Units into the skin every morning  Qty: 10 mL, Refills: 3      Glucagon 3 MG/DOSE POWD by Nasal route See Admin Instructions      omeprazole (PRILOSEC) 20 MG delayed release capsule Take 20 mg by mouth nightly      hydrOXYzine (ATARAX) 25 MG tablet Take 25 mg by mouth every 8 hours as needed for Itching      epoetin nena-epbx (RETACRIT) 36120 UNIT/ML SOLN injection Inject 0.5 mLs into the skin three times a week  Qty: 6.6 mL      pregabalin (LYRICA) 50 MG capsule Take 1 capsule by mouth 3 times daily for 30 days.   Qty: 90 capsule, Refills: 0    Associated Diagnoses: Uncontrolled type 1 diabetes mellitus with hyperglycemia (HCC)      sevelamer (RENVELA) 800 MG tablet Take 2 tablets by mouth 3 times daily (with meals)      insulin lispro (HUMALOG) 100 UNIT/ML injection vial Inject 0-3 Units into the skin 4 times daily (before meals and nightly)  Qty: 10 mL, Refills: 3      dilTIAZem (CARDIZEM) 90 MG tablet Take 1 tablet by mouth 4 times daily Hold the morning of Hemodialysis  Hold For SBP <100, HR<55  Qty: 120 tablet, Refills: 3      cholestyramine (QUESTRAN) 4 g packet Take 1 packet by mouth 2 times daily  Qty: 90 packet, Refills: 0      promethazine (PHENERGAN) 25 MG tablet Take 25 mg by mouth every 6 hours as needed for Nausea      ondansetron (ZOFRAN) 4 MG tablet Take 4 mg by mouth every 8 hours as needed for Nausea or Vomiting      albuterol sulfate  (90 Base) MCG/ACT inhaler Inhale 4 puffs into the lungs as needed for Wheezing or Shortness of Breath  Qty: 18 g, Refills: 0      vitamin D (ERGOCALCIFEROL) 1.25 MG (63015 UT) CAPS capsule Take 1 capsule by mouth once a week  Qty: 5 capsule, Refills: 0      mirtazapine (REMERON) 15 MG tablet Take 15 mg by mouth nightly      ARIPiprazole (ABILIFY) 5 MG tablet Take 5 mg by mouth nightly      levothyroxine (SYNTHROID) 75 MCG tablet TAKE 1 TABLET BY MOUTH IN THE MORNING BEFORE BREAKFAST  Qty: 60 tablet, Refills: 0      rOPINIRole (REQUIP) 0.25 MG tablet Take 0.25 mg by mouth nightly             Internal medicine    Follow ups   Please follow up with the internal medicine clinic at [facility] within 12 days of discharge. You will receive a phone call within 7 days from discharge.  Please keep all other follow up appointments:  o Endocrinology - Dr. Adi Lucas. The phone number for Dr. Christy Burnett office is [phone]    Changes in healthcare    Please take all medications as indicated   Diet: diabetic diet    Activity: activity as tolerated   New Medications started during this hospital stay  o Metronidazole (Flagyl) - please take as prescribed for another 2 days. Your date of last administration is 4/4/2022      Changes to your medications  o Insulin - your insulin dosage has changed. Please take 1U Lantus nightly, and use the modified sliding scale as follows:                   No Insulin   200-300 1 Unit   301-400 2 Units   Over 400 3 Units      Additional labs, testing or imaging needed after discharge.  Please have these labs performed in 1 week   o CBC   o CMP  o Vit D      Even if you are feeling better and not having symptoms do not stop taking antibiotic earlier then prescribed: 4/4/2022   Please contact us if you have any concerns, wish to change or make an appointment:  [facility]    Phone: [address] [place] [address] Fax: [phone] [place] [address] [address]   Or please call the nurse line at [phone].  Should you have further questions in regards to this visit, you can review your clinical note and after visit summary document on your [employer] account. Other questions can be directed to our nurse line at [phone].  Other than any new prescriptions given to you today, the list of home medications on this After Visit Summary are based on information provided to us from you and your healthcare providers. This information, including the list, dose, and frequency of medications is only as accurate as the information you provided. If you have any questions or concerns about your home medications, please contact your Primary Care Physician for further clarification.     Bonnielee Gilford,   PGY 2   11:45 AM 4/2/2022

## 2022-04-02 NOTE — PROGRESS NOTES
Department of Internal Medicine  Nephrology Progress Note      Events reviewed. Seen on dialysis and tolerating well. SUBJECTIVE:  We are following Miss Iam Qiu for ESKD on HD. Patient reports no complaints. PHYSICAL EXAM:      Vitals:    VITALS:  BP (!) 147/90   Pulse 81   Temp 97.5 °F (36.4 °C) (Axillary)   Resp 12   Ht 5' 4\" (1.626 m)   Wt 142 lb 1.4 oz (64.4 kg)   SpO2 94%   BMI 24.39 kg/m²   24HR INTAKE/OUTPUT:      Intake/Output Summary (Last 24 hours) at 4/2/2022 0850  Last data filed at 4/1/2022 1500  Gross per 24 hour   Intake 580 ml   Output 0 ml   Net 580 ml       Access: RIJ tunneled dialysis catheter  Constitutional: Awake, alert, NAD  HEENT:  PERRL, normocephalic, atraumatic  Respiratory:  CTA bilateral  Cardiovascular/Edema:  ST, RRR, no murmur gallop or rub  Gastrointestinal:  abd distended, c/o persistent abdominal pain  Neurologic: A&OX3 follows commands, no focal deficit  Skin:  Warm, dry, ecchymotic areas to abdomen    Other:    Scheduled Meds:   insulin lispro  0-3 Units SubCUTAneous 4x Daily AC & HS    sevelamer  800 mg Oral TID WC    metroNIDAZOLE  500 mg Oral 3 times per day    sodium chloride flush  5-40 mL IntraVENous 2 times per day    heparin flush  1 mL IntraVENous 2 times per day    insulin glargine  1 Units SubCUTAneous Nightly    melatonin  5 mg Oral Nightly    dilTIAZem  90 mg Oral Q6H    mirtazapine  15 mg Oral Nightly    escitalopram  10 mg Oral Daily    ARIPiprazole  5 mg Oral Nightly    rOPINIRole  0.25 mg Oral Nightly    levothyroxine  88 mcg Oral Daily    famotidine  10 mg Oral Daily    epoetin nena-epbx  3,000 Units SubCUTAneous Once per day on Mon Wed Fri    Vitamin D  1,000 Units Per NG tube Daily     Continuous Infusions:   sodium chloride      sodium chloride      dextrose       PRN Meds:. loperamide, sodium chloride, sodium chloride flush, sodium chloride, heparin flush, ondansetron, white petrolatum, polyethylene glycol, acetaminophen **OR** acetaminophen, glucose, dextrose, glucagon (rDNA), dextrose, albuterol, labetalol    DATA:    CBC:   Lab Results   Component Value Date    WBC 5.3 04/02/2022    RBC 2.85 04/02/2022    HGB 7.2 04/02/2022    HCT 23.2 04/02/2022    MCV 81.4 04/02/2022    MCH 25.3 04/02/2022    MCHC 31.0 04/02/2022    RDW 17.9 04/02/2022     04/02/2022    MPV 10.6 04/02/2022     CMP:    Lab Results   Component Value Date     04/02/2022    K 3.9 04/02/2022    K 3.7 03/04/2022     04/02/2022    CO2 26 04/02/2022    BUN 24 04/02/2022    CREATININE 2.7 04/02/2022    GFRAA 25 04/02/2022    LABGLOM 25 04/02/2022    GLUCOSE 260 04/02/2022    GLUCOSE 130 05/18/2012    PROT 6.4 04/02/2022    LABALBU 2.6 04/02/2022    LABALBU 4.1 05/18/2012    CALCIUM 7.6 04/02/2022    BILITOT <0.2 04/02/2022    ALKPHOS 172 04/02/2022    AST 28 04/02/2022    ALT 26 04/02/2022     Magnesium:    Lab Results   Component Value Date    MG 2.2 04/02/2022     Phosphorus:    Lab Results   Component Value Date    PHOS 3.1 04/02/2022     Radiology Review:      CT Head WO Contrast March 27, 2022   No acute intracranial abnormality or hemorrhage.         CT Abdomen Pelvis WO Contrast March 27, 2022   1.  Moderate ascites throughout abdomen and pelvis.       2.  Multiple thick walled loops of small bowel throughout the abdomen could   suggest nonspecific infectious or inflammatory enteritis.       3.  Generalized body wall edema.       4.  Small bilateral pleural effusions with adjacent atelectatic changes.             Chest X-Ray March 28, 2022   Infiltrate and or atelectasis at the left lower lobe.  Substantially resolved   infiltrate and or atelectasis on the right.  New NG tube.               BRIEF SUMMARY OF INITIAL CONSULT:    Briefly, Miss Jeanne Brewer is a 36year-old female with a PMH of ESRD on hemodialysis 3 days/wk, on TTS schedule at [facility] I DM, poorly controlled with recurrent admissions for DKA, HTN, gastroparesis, ascites, infective endocarditis, cardiac arrest, hypothyroidism and depression who was admitted on March 27, 2022 after she was found unresponsive at the SNF where she resides. Patient was found to be profoundly hypoglycemic and EMS was called. She was intubated in ER for airway protection as she remained unresponsive. CT head showed no acute intracranial abnormality or hemorrhage, chest x-ray demonstrates right perihilar opacity concerning for aspiration pneumonia. She was ultimately admitted to MICU for further evaluation. Labs on admission were significant for sodium 135, potassium 5.0, chloride 100, bicarbonate 25, BUN 38, creatinine 4.9, proBNP >70,000. We are consulted for dialysis management. Problems resolved. · Hypoglycemia, was on D10, now hyperglycemic with +ketones (beta-hydroxybutyrate >4.5)  · Acute respiratory failure, 2/2 aspiration pneumonia? On 40% Fio2. Extubated 6/39  · Acute metabolic encephalopathy 2/2 hypoglycemia. Resolving   · Acidemia, pH 7.311, PCO2 35.0, HCO3 58.1, metabolic acidosis from DKA    IMPRESSION/RECOMMENDATIONS:      1. ESRD 3 times a week TTS via RIJ tunneled dialysis catheter. HD initiated September 2021 after failed peritoneal dialysis with persistent hyperkalemia. For dialysis three times/wk TTS while inpatient. 2. HTN, on cardizem 90 mg po qid  3. MBD of CKD, continue sevelamer 800 mg po tid with meals, and monitor phosphorus levels. Hold today  4. Anemia of CKD, Hgb 6.5 mg/dL on admission, continue epoetin alpha 3,000 unit 3 times/wk. S/p transfusion  5. Vitamin D deficiency, on ergocalciferol 1000 po daily  6. Adrenal insufficiency? Plan is for cosyntropin stim test. On stress does steroids  ------------------------------------------------------  7. Type I DM, poorly controlled with frequent readmissions for DKA, on SSI, lantus decreased to 1 unit nightly  8. Aspiration pneumonia? On piperacillin    9. Recent Covid 19 infection, unvaccinated  8.  Restless leg syndrome, on ropinirole at home  11. Depression, on escitalopram and Abilify  12. Hypothyroidism, on levothyroxine   13. History of gastroparesis, on metoclopramide at home. For EGD and pyloric dilatation with Botox injection outpatient per GI.  14. Hx recurrent C. difficile infection, on flagyl po Q 8hrs X 3 days  15. Hx of MDRO UTI  16. Nutrition, diabetic diet     Plan:     · Continue HD today and three times/wk, TTS while inpatient, Seen on dialysis and tolerating well.   · Continue epoetin alpha 3,000 units 3 times/wk  · Continue to monitor labs daily   · Continue to monitor glucose levels  · Continue to monitor calcium levels  · Monitor phosphorus levels daily,hodl sevelamer today  · Monitor H&H, transfuse <7   · Discharge planning    Electronically signed by HEBER Villagomez CNP on 4/2/2022 at 8:50 AM    Patient seen and examined and agree with above as annotated  Discussed with KIM GRAFN FACP

## 2022-04-02 NOTE — PROGRESS NOTES
Endocrine Brief Communication Note         Date of admission: 3/27/2022  Date of service: 4/2/2022  Admitting physician: Torey Farias DO   Primary Care Physician: Melany Glass MD  Consultant physician: Jasiel Mcnair MD      Reason for the consultation:  DM type 1     Subjective:   I have reviewed patient's chart, blood glucose trend & insulin requirement.  BG still above goal     Point of care glucose monitoring (Independently reviewed)   Recent Labs     03/31/22  1822 03/31/22  2108 04/01/22  0126 04/01/22  0913 04/01/22  1149 04/01/22  1700 04/01/22 2011 04/02/22  1048   GLUMET 230* 229* 162* 230* 282* 284* 302* 206*       Current medications:   insulin glargine  2 Units SubCUTAneous Nightly    insulin lispro  0-3 Units SubCUTAneous 4x Daily AC & HS    [Held by provider] sevelamer  800 mg Oral TID WC    metroNIDAZOLE  500 mg Oral 3 times per day    sodium chloride flush  5-40 mL IntraVENous 2 times per day    heparin flush  1 mL IntraVENous 2 times per day    melatonin  5 mg Oral Nightly    dilTIAZem  90 mg Oral Q6H    mirtazapine  15 mg Oral Nightly    escitalopram  10 mg Oral Daily    ARIPiprazole  5 mg Oral Nightly    rOPINIRole  0.25 mg Oral Nightly    levothyroxine  88 mcg Oral Daily    famotidine  10 mg Oral Daily    epoetin nena-epbx  3,000 Units SubCUTAneous Once per day on Mon Wed Fri    Vitamin D  1,000 Units Per NG tube Daily     ASSESSMENT & PLAN   Wilton Flores, a 36 y.o.-old female seen today for inpatient diabetes management     Diabetes Mellitus type I    · The patient is extremely insulin sensitive   · we recommend the following sq insulin regimen   · Lantus two units daily  · Modified Low dose sliding scale (1u:100>200) with meals and at night   · Continue following BG and adjust insulin regimen    Evaluation for adrenal insufficiency   · With type 1 AM and primary hypothyroidism, will need to r/o AI in this patient (Autoimmune Polyendocrine syndrome type 2, or Shmidth syndrome)   Off steroids    Plan to perform cosyntropin stim test tomorrow morning     primary Hypothyroidism  · Levothyroxine was adjusted during this admission to 88 mcg daily      ESRD  · Pt at risk for hypoglycemia  · On dialysis, nephrology following      Melina Hodgkins MD  Endocrinologist, [facility]   [address] [facility], [address] [place] [address],[phone] [id number]   Phone: [phone]  Fax: [phone]  -------------------------  An electronic signature was used to authenticate this note.  Liza Yadav MD on 4/2/2022 at 2:40 PM

## 2022-04-02 NOTE — PLAN OF CARE
Problem: Skin Integrity:  Goal: Will show no infection signs and symptoms  Description: Will show no infection signs and symptoms  4/2/2022 2877 by Jared Benavides RN  Outcome: Met This Shift     Problem: Skin Integrity:  Goal: Absence of new skin breakdown  Description: Absence of new skin breakdown  4/2/2022 7830 by Jared Benavides RN  Outcome: Met This Shift     Problem: Serum Glucose Level - Abnormal:  Goal: Ability to maintain appropriate glucose levels has stabilized  Description: Ability to maintain appropriate glucose levels has stabilized  4/2/2022 8346 by Jared Benavides RN  Outcome: Met This Shift     Problem: Breathing Pattern - Ineffective:  Goal: Ability to achieve and maintain a regular respiratory rate will improve  Description: Ability to achieve and maintain a regular respiratory rate will improve  4/2/2022 5016 by Jared Benavides RN  Outcome: Met This Shift     Problem: Cardiac Output - Decreased:  Goal: Hemodynamic stability will improve  Description: Hemodynamic stability will improve  4/2/2022 4436 by Jared Benavides RN  Outcome: Met This Shift     Problem: Pain:  Goal: Pain level will decrease  Description: Pain level will decrease  4/2/2022 3950 by Jared Benavides RN  Outcome: Met This Shift     Problem: Pain:  Goal: Control of acute pain  Description: Control of acute pain  4/2/2022 0632 by Jared Benavides RN  Outcome: Met This Shift

## 2022-04-02 NOTE — DISCHARGE SUMMARY
[address]  Discharge Summary    PCP: Cele Hoyos MD    Admit Date:3/27/2022  Discharge Date: 4/2/2022    Admission Diagnosis:   1. Acute metabolic encephalopathy - concern for symptomatic hypoglycemia   2. Respiratory distress, concern for aspiration pneumonia/pneumonitis   3. Elevated troponin in the setting of ESRD   4. Hx ESRD on HD   5. Hx brittle T1DM   6. Hx anemia of chronic disease   7. Hx HTN   8. Hx recent C. Diff infection   9. Hx Endocarditis w/ septic emboli formation    Discharge Diagnosis:  1. Acute metabolic encephalopathy likely 2/2 symptomatic hypoglycemia   2. Respiratory distress, concern for aspiration pneumonia/pneumonitis   3. Elevated troponin in the setting of ESRD   4. Hx ESRD on HD   5. Hx brittle T1DM surjit disease   6. Hx HTN   7. Hx recent C. Diff infection   8. Hx Endocarditis w/ septic emboli formation  9. Hx anemia of     Hospital Course: The pt is a 37 y/o female with a PMHx of brittle T1DM, ESRD on HD, HTN, recent C. Diff infection, and endocarditis w/ consequent septic emboli formation, who presented to the ED from her long-term care facility and was hospitalized on 3/28/2022. Her presenting complaint was acute encephalopathy. Per note review, the pt's LKW was 1100 on 3/28/2022. She was found lethartic and altered at approx. 1700 by NH staff, and subsequent POCT BG was too low to be read by the glucometer. EMS was called, and the pt was transported emergently to [facility]. On arrival, she was intubated for airway protection, and was transferred to the MICU. On arrival, she was tachycardic, hypertensive, afebrile, O2 saturation 100% on the ventilator. Pro-BNP and troponin were elevated, and labs were also notable for anemia to 6.5 (baseline 6.0-8.0). UA showing pyuria and hematuria, moderate bacteria, and large LE. CXR showing possible infiltrate/edema, and CT abdomen/pelvis concerning for possible enteritis. CT head (-)ve. Once in the MICU, the pt was started on empiric antibiotic treatment for possible aspiration pneumonia/pneumonitis, as well as suspected enteritis. Hemodialysis was restarted, and Endocrinology was consulted for aid with managing extremely labile blood sugars. Infectious cultures were negative, and the patient's antimicrobial regimen was de-escalated to Flagyl q8hrs (end date 4/4/2022). Her blood sugars were controlled w/ 1U Lantus nightly and a modified sliding scale. She was successfully extubated, and has been tolerating room air well since that time. Currently, the pt is considered stabilized from a medical perspective. She will be discharged in stable condition to return to [facility]. All appropriate medication changes and outpatient lab work have been communicated to the patient, and all questions were answered prior to her discharge. Significant findings (history and exam, laboratory, radiological, pathology, other tests):   Vitals: /84   Pulse 91   Temp 95.8 °F (35.4 °C)   Resp 13   Ht 5' 4\" (1.626 m)   Wt 142 lb 6.7 oz (64.6 kg)   SpO2 98%   BMI 24.45 kg/m²       · Constitutional: Sedated, intubated, awake and able to answer questions/follow commands appropriately. Follows commands. In no apparent distress. · Head: Normocephalic and atraumatic. · Eyes: EOMI, conjunctiva normal, (-) scleral icterus. Mucus membranes moist.  · Mouth: Mucus membranes moist. Oropharynx clear. No deviation of the tongue or uvula. Normal dentition. · Neck: (-)  swelling, (-) JVD, trachea midline  · Respiratory: ETT in place, lungs clear to auscultation bilaterally. (-)  wheezes, (-)  rales, (-)  rhonchi. No increased work of breathing or respiratory distress  · Cardiovascular: RRR. (-)  murmurs, (-) gallops,  (-) rubs. S1 and S2 were normal.   · GI:  Abdomen soft, non-tender, non-distended. (+) BS. (-) guarding, (-) rigidity.     · Extremities: Warm and well perfused. (-) clubbing, (-) cyanosis.  2+ distal pulses. (-) peripheral edema. (-)Sacral pitting edema. · Neurologic:  No focal neurological deficits. No tremor or overt seizure activity    Pending test results: N/A     Consults:  1. Endocrinology   2. Nephrology   3. Infectious Disease   4. Intensive Care     Procedures: N/A     Condition at discharge: Stable    Disposition: SNF    Discharge Medications:  Current Discharge Medication List      CONTINUE these medications which have NOT CHANGED    Details   escitalopram (LEXAPRO) 10 MG tablet Take 1 tablet by mouth daily  Qty: 30 tablet, Refills: 3      insulin glargine (LANTUS) 100 UNIT/ML injection vial Inject 5 Units into the skin every morning  Qty: 10 mL, Refills: 3      Glucagon 3 MG/DOSE POWD by Nasal route See Admin Instructions      omeprazole (PRILOSEC) 20 MG delayed release capsule Take 20 mg by mouth nightly      hydrOXYzine (ATARAX) 25 MG tablet Take 25 mg by mouth every 8 hours as needed for Itching      epoetin nena-epbx (RETACRIT) 26366 UNIT/ML SOLN injection Inject 0.5 mLs into the skin three times a week  Qty: 6.6 mL      pregabalin (LYRICA) 50 MG capsule Take 1 capsule by mouth 3 times daily for 30 days.   Qty: 90 capsule, Refills: 0    Associated Diagnoses: Uncontrolled type 1 diabetes mellitus with hyperglycemia (HCC)      sevelamer (RENVELA) 800 MG tablet Take 2 tablets by mouth 3 times daily (with meals)      insulin lispro (HUMALOG) 100 UNIT/ML injection vial Inject 0-3 Units into the skin 4 times daily (before meals and nightly)  Qty: 10 mL, Refills: 3      dilTIAZem (CARDIZEM) 90 MG tablet Take 1 tablet by mouth 4 times daily Hold the morning of Hemodialysis  Hold For SBP <100, HR<55  Qty: 120 tablet, Refills: 3      cholestyramine (QUESTRAN) 4 g packet Take 1 packet by mouth 2 times daily  Qty: 90 packet, Refills: 0      promethazine (PHENERGAN) 25 MG tablet Take 25 mg by mouth every 6 hours as needed for Nausea      ondansetron (ZOFRAN) 4 MG tablet Take 4 mg by mouth every 8 [address] [place] [address] Fax: [phone] [place] [address]   Or please call the nurse line at [phone].  Should you have further questions in regards to this visit, you can review your clinical note and after visit summary document on your [facility] account. Other questions can be directed to our nurse line at [phone].  Other than any new prescriptions given to you today, the list of home medications on this After Visit Summary are based on information provided to us from you and your healthcare providers. This information, including the list, dose, and frequency of medications is only as accurate as the information you provided. If you have any questions or concerns about your home medications, please contact your Primary Care Physician for further clarification.     Aakash Schuler, DO  PGY 2   10:50 AM 4/2/2022

## 2022-04-02 NOTE — FLOWSHEET NOTE
04/02/22 1206   Vital Signs   BP (!) 146/97   Temp 95.8 °F (35.4 °C)   Pulse 88   Resp 15   Height 5' 4\" (1.626 m)   Weight 139 lb 1.8 oz (63.1 kg)   Weight Method Bed scale   Percent Weight Change -2.32   Post-Hemodialysis Assessment   Post-Treatment Procedures Blood returned;Catheter capped, clamped with Citrate x 2 ports   Machine Disinfection Process Acid/Vinegar Clean;Heat Disinfect; Exterior Machine Disinfection   Rinseback Volume (ml) 200 ml   Total Liters Processed (l/min) 64.1 l/min   Dialyzer Clearance Clear   Duration of Treatment (minutes) 210 minutes   Heparin amount administered during treatment (units) 0 units   Hemodialysis Intake (ml) 100 ml   Hemodialysis Output (ml) 1800 ml   NET Removed (ml) 1500 ml   Tolerated Treatment Good   Patient Response to Treatment Diaolysis term w/o diff.  repost to [facility] 1500cc removed   Bilateral Breath Sounds Clear   Edema None   Edema Generalized None

## 2022-04-02 NOTE — PROGRESS NOTES
Department of Internal Medicine  Infectious Diseases  Progress  Note      C/C : Aspiration pneumonia     Pt is extubated   Awake and alert  Denies fever or chills  Reports SOB   Afebrile       Current Facility-Administered Medications   Medication Dose Route Frequency Provider Last Rate Last Admin    loperamide (IMODIUM) capsule 2 mg  2 mg Oral 4x Daily PRN Lonnie Cadet MD   2 mg at 04/02/22 0329    insulin lispro (HUMALOG) injection vial 0-3 Units  0-3 Units SubCUTAneous 4x Daily AC & HS Kassi Rey MD   1 Units at 04/02/22 1509    0.9 % sodium chloride infusion   IntraVENous PRN Jacinda Littlejohn DO        [Held by provider] sevelamer (RENVELA) tablet 800 mg  800 mg Oral TID WC HEBER Garvin - CNP   800 mg at 04/02/22 2242    metroNIDAZOLE (FLAGYL) tablet 500 mg  500 mg Oral 3 times per day Kishor Brenner MD   500 mg at 04/02/22 8492    sodium chloride flush 0.9 % injection 5-40 mL  5-40 mL IntraVENous 2 times per day Delma Elizabeth MD   10 mL at 04/02/22 0840    sodium chloride flush 0.9 % injection 5-40 mL  5-40 mL IntraVENous PRN Delma Elizabeth MD        0.9 % sodium chloride infusion   IntraVENous PRN Delma Elizabeth MD        heparin flush 100 UNIT/ML injection 100 Units  1 mL IntraVENous 2 times per day Delma Elizabeth MD   100 Units at 04/02/22 0839    heparin flush 100 UNIT/ML injection 100 Units  1 mL IntraCATHeter PRN Delma Elizabeth MD        insulin glargine-[facility]) injection vial 1 Units  1 Units SubCUTAneous Nightly Delma Elizabeth MD   1 Units at 04/01/22 2012    ondansetron [facility]) injection 4 mg  4 mg IntraVENous Q6H PRN Layla Borden MD   4 mg at 04/02/22 0215    white petrolatum ointment   Topical BID PRN Delma Elizabeth MD        melatonin disintegrating tablet 5 mg  5 mg Oral Nightly Jacindayael Littlejohn, DO   5 mg at 04/01/22 2012    dilTIAZem (CARDIZEM) tablet 90 mg  90 mg Oral Q6H Eric Steve MD   90 mg at 04/02/22 0630    mirtazapine (REMERON) tablet 15 mg  15 mg Oral Nightly Jacinda L Littlejohn, DO   15 mg at 04/01/22 2012    escitalopram (LEXAPRO) tablet 10 mg  10 mg Oral Daily Jacinda L Littlejohn, DO   10 mg at 04/02/22 9420    ARIPiprazole (ABILIFY) tablet 5 mg  5 mg Oral Nightly Jacinda L Littlejohn, DO   5 mg at 04/01/22 2012    rOPINIRole (REQUIP) tablet 0.25 mg  0.25 mg Oral Nightly Jacinda L Littlejohn, DO   0.25 mg at 04/01/22 2012    polyethylene glycol (GLYCOLAX) packet 17 g  17 g Oral Daily PRN Derrell Coombes, DO        acetaminophen (TYLENOL) tablet 650 mg  650 mg Oral Q6H PRN Derrell Coombes, DO   650 mg at 04/01/22 2011    Or    acetaminophen (TYLENOL) suppository 650 mg  650 mg Rectal Q6H PRN Jacinda L Littlejohn, DO        glucose (GLUTOSE) 40 % oral gel 15 g  15 g Oral PRN Jacinda L Littlejohn, DO        dextrose 50 % IV solution  12.5 g IntraVENous PRN Jacinda L Littlejohn, DO   12.5 g at 03/28/22 0358    glucagon (rDNA) injection 1 mg  1 mg IntraMUSCular PRN Jacinda L Littlejohn, DO        dextrose 5 % solution  100 mL/hr IntraVENous PRN Jacinda L Littlejohn, DO        albuterol (PROVENTIL) nebulizer solution 2.5 mg  2.5 mg Nebulization Q6H PRN Chance Burleson MD        levothyroxine (SYNTHROID) tablet 88 mcg  88 mcg Oral Daily Evita Mcdonnell MD   88 mcg at 04/02/22 0626    famotidine (PEPCID) tablet 10 mg  10 mg Oral Daily Michelle Paiz MD   10 mg at 04/02/22 4061    epoetin nena-epbx (RETACRIT) injection 3,000 Units  3,000 Units SubCUTAneous Once per day on Mon Wed Fri HEBER Tom CNP   3,000 Units at 04/01/22 9632    Vitamin D (CHOLECALCIFEROL) tablet 1,000 Units  1,000 Units Per NG tube Daily HEBER Tom CNP   1,000 Units at 04/02/22 4476    labetalol (NORMODYNE;TRANDATE) injection 5 mg  5 mg IntraVENous Q6H PRN Arleen Alejandra MD           REVIEW OF SYSTEMS:      CONSTITUTIONAL: Denies fever or chills   HEENT: Denies sore throat   RESPIRATORY: SOB   CARDIOVASCULAR:  Denies palpitation  GASTROINTESTINAL: Denies abdomen pain , no diarrhea   GENITOURINARY:  ESRD on HD   INTEGUMENT: No rash    HEMATOLOGIC/LYMPHATIC:  No bleeding . MUSCULOSKELETAL:  Denies leg swelling    NEUROLOGICAL:  denies loss of consciousness or weakness        PHYSICAL EXAM:      Vitals:     /84   Pulse 90   Temp 95.3 °F (35.2 °C) (Oral)   Resp 9   Ht 5' 4\" (1.626 m)   Wt 142 lb 1.4 oz (64.4 kg)   SpO2 98%   BMI 24.39 kg/m²       General Appearance:    Extubated, awake , alert, no distress    Head:    Normocephalic, atraumatic   Eyes:    +  pallor, no icterus,   Ears:    No obvious deformity or drainage.    Nose:   No nasal drainage   Throat:   Mucosa moist    Neck:   Supple, no lymphadenopathy   Lungs:     Crackles +   Heart:    Tachycardia    Abdomen:     Soft, non-tender, bowel sounds present    Extremities:   No edema,   Pulses:   Dorsalis pedis palpable    Skin:   no rashes , right chest Tesio catheter    CBC with Differential:      Lab Results   Component Value Date    WBC 5.3 04/02/2022    RBC 2.85 04/02/2022    HGB 7.2 04/02/2022    HCT 23.2 04/02/2022     04/02/2022    MCV 81.4 04/02/2022    MCH 25.3 04/02/2022    MCHC 31.0 04/02/2022    RDW 17.9 04/02/2022    NRBC 0.9 03/28/2022    SEGSPCT 67 06/27/2013    METASPCT 1.7 08/10/2020    LYMPHOPCT 16.2 04/02/2022    MONOPCT 4.5 04/02/2022    MYELOPCT 0.9 01/19/2022    BASOPCT 0.2 04/02/2022    MONOSABS 0.24 04/02/2022    LYMPHSABS 0.86 04/02/2022    EOSABS 0.04 04/02/2022    BASOSABS 0.01 04/02/2022       CMP     Lab Results   Component Value Date     04/02/2022    K 3.9 04/02/2022    K 3.7 03/04/2022     04/02/2022    CO2 26 04/02/2022    BUN 24 04/02/2022    CREATININE 2.7 04/02/2022    GFRAA 25 04/02/2022    LABGLOM 25 04/02/2022    GLUCOSE 260 04/02/2022    GLUCOSE 130 05/18/2012    PROT 6.4 04/02/2022    LABALBU 2.6 04/02/2022    LABALBU 4.1 05/18/2012    CALCIUM 7.6 04/02/2022    BILITOT <0.2 04/02/2022    ALKPHOS 172 04/02/2022    AST 28 04/02/2022 ALT 26 04/02/2022         Hepatic Function Panel:    Lab Results   Component Value Date    ALKPHOS 172 04/02/2022    ALT 26 04/02/2022    AST 28 04/02/2022    PROT 6.4 04/02/2022    BILITOT <0.2 04/02/2022    BILIDIR <0.2 04/02/2022    IBILI see below 04/02/2022    LABALBU 2.6 04/02/2022    LABALBU 4.1 05/18/2012       PT/INR:    Lab Results   Component Value Date    PROTIME 12.1 03/31/2022    PROTIME 13.6 08/14/2011    INR 1.1 03/31/2022       TSH:    Lab Results   Component Value Date    TSH 5.370 03/28/2022       U/A:    Lab Results   Component Value Date    COLORU Yellow 03/28/2022    PHUR 8.5 03/28/2022    LABCAST RARE 01/12/2020    WBCUA >20 03/28/2022    WBCUA 0-1 05/18/2012    RBCUA 5-10 03/28/2022    RBCUA 1-3 08/01/2013    MUCUS Present 02/12/2016    TRICHOMONAS Present 07/17/2020    YEAST RARE 05/24/2018    BACTERIA MANY 03/28/2022    CLARITYU Clear 03/28/2022    SPECGRAV 1.015 03/28/2022    LEUKOCYTESUR LARGE 03/28/2022    UROBILINOGEN 0.2 03/28/2022    BILIRUBINUR Negative 03/28/2022    BILIRUBINUR SMALL 05/18/2012    BLOODU MODERATE 03/28/2022    GLUCOSEU 100 03/28/2022    GLUCOSEU >=1000 05/18/2012    AMORPHOUS RARE 11/01/2019       ABG:    Lab Results   Component Value Date    PKQ0XTQ 14.0 02/15/2021    R0MGTVHK 97.7 09/08/2012    PHART 7.345 07/20/2012    ACU4KAQ 35.0 10/29/2019    PO2ART 91.5 10/29/2019       MICROBIOLOGY:    Blood culture - neg to date   Sputum -  Specimen: Sputum, Suctioned Updated: 03/29/22 0826      CULTURE, RESPIRATORY Oral Pharyngeal Celine present    Smear, Respiratory --    Few Polymorphonuclear leukocytes   Rare Epithelial cells   Few Gram positive coccobacillary organisms    Narrative:             Radiology :    Chest X ray : LLL infiltrates       IMPRESSION:     1. Aspiration pneumonia, elevated procalcitonin, leukocytosis   2. Resp failure - improved    3.  Hx of recurrent C diff infection     RECOMMENDATIONS:      1. Flagyl  po 500 mg q 8 hrs ( hx of recurrent C diff infection )  X 2 days    2.  OK to discharge from ID POV

## 2022-04-02 NOTE — PROGRESS NOTES
[facility]    Attending Physician Statement:  Gloria Iyer D.O. I have discussed the case, including pertinent history and exam findings with the resident. I agree with the assessment, plan and orders as documented by the resident. Patient here for routine follow up of medical problems. IDDM2: multiple episodes of hypoglycemia as outpatient; endocrinology evaluating patient at this time; continue treatment per their recs; BG elevated at this time 2/2 corticosteroids; End-stage renal disease on hemodialysis: Nephrology consulted and for dialysis on a TTS schedule, care per their recommendations,     History of recurrent C. difficile infections: Patient currently receiving Flagyl per infectious disease recommendations    For DC when accepted at facility     Remainder of medical problems as per resident note.

## 2022-04-02 NOTE — PROGRESS NOTES
[address] [facility]  Department of Internal Medicine   Internal Medicine Residency   MICU Progress Note    Patient:  Aster Hyde 36 y.o. female  MRN: [id number]     Date of Service: 4/2/2022    Allergy: Cefepime and Toradol [ketorolac tromethamine]    Subjective     Patient seen and examined at bedside this morning undergoing HD. She is alert and awake, has some mild crampy generalized abdominal pain unchanged from previous, last bowel movement was last night per patient. Otherwise no issues, Precert is pendin to [place] to [facility], stable to transfer to medical floor     Objective     VS: /87   Pulse 90   Temp 95.8 °F (35.4 °C)   Resp 20   Ht 5' 4\" (1.626 m)   Wt 139 lb 1.8 oz (63.1 kg)   SpO2 97%   BMI 23.88 kg/m²           I & O - 24hr:   Intake/Output Summary (Last 24 hours) at 4/2/2022 1420  Last data filed at 4/2/2022 1206  Gross per 24 hour   Intake 580 ml   Output 1800 ml   Net -1220 ml       Physical Exam:  General Appearance: alert, appears stated age and cooperative  Neck: no adenopathy, no carotid bruit, no JVD, supple, symmetrical, trachea midline and thyroid not enlarged, symmetric, no tenderness/mass/nodules  Lung: clear to auscultation bilaterally  Heart: regular rate and rhythm, S1, S2 normal, no murmur, click, rub or gallop  Abdomen: soft, non-tender; bowel sounds normal; no masses,  no organomegaly  Extremities:  extremities normal, atraumatic, no cyanosis or edema  Musculoskeletal: No joint swelling, no muscle tenderness. ROM normal in all joints of extremities.    Neurologic: Mental status: Alert, oriented, thought content appropriate    Lines     site day    Art line   None    TLC None    PICC None    Hemoaccess R IJ    Oxygen:     Room air   ABG:     Lab Results   Component Value Date    PHART 7.345 07/20/2012    PH 7.501 03/30/2022    PH 7.290 09/08/2012    SPR0XDX 35.0 10/29/2019    PCO2 31.9 03/30/2022    PO2ART 91.5 10/29/2019    PO2 126.8 03/30/2022    YXO5IIF 14.0 02/15/2021    HCO3 24.4 03/30/2022    BE 1.4 03/30/2022    THB 8.4 03/30/2022    [id number] 97.7 09/08/2012    O2SAT 98.8 03/30/2022        Medications     Infusions: (Fluid, Sedation, Vasopressors)  IVF:   None   Vasopressors  None   Sedation  None     ATB:   Antibiotics  Days   Metronidazole               Patient currently has     Isolation  DVT prophylaxis/ GI prophylaxis,    PT/OT  SNF/NH placement    Labs     CBC:   Lab Results   Component Value Date    WBC 5.3 04/02/2022    RBC 2.85 04/02/2022    HGB 7.2 04/02/2022    HCT 23.2 04/02/2022    MCV 81.4 04/02/2022    MCH 25.3 04/02/2022    MCHC 31.0 04/02/2022    RDW 17.9 04/02/2022     04/02/2022    MPV 10.6 04/02/2022     CMP:    Lab Results   Component Value Date     04/02/2022    K 3.9 04/02/2022    K 3.7 03/04/2022     04/02/2022    CO2 26 04/02/2022    BUN 24 04/02/2022    CREATININE 2.7 04/02/2022    GFRAA 25 04/02/2022    LABGLOM 25 04/02/2022    GLUCOSE 260 04/02/2022    GLUCOSE 130 05/18/2012    PROT 6.4 04/02/2022    LABALBU 2.6 04/02/2022    LABALBU 4.1 05/18/2012    CALCIUM 7.6 04/02/2022    BILITOT <0.2 04/02/2022    ALKPHOS 172 04/02/2022    AST 28 04/02/2022    ALT 26 04/02/2022       Resident's Assessment and Plan     Lidya Dowling is a 36year old female with PMhx of uncontrolled T1DM, ESRD on HD, Hypothyroidism, prior endocarditis with septic emboli, Anemia of chronic disease. Who we are currently managing for the following:     Brittle IDDM1 Hba1c 7.7% with severe fluctuations of blood glucoses   Acute metabolic encephaloapathy likely 2/2 to hypoglycemia - improved   Acute respiratory failure 2/2 to AMS, possible aspiration PNA, s/p intubation   HTN   ESRD on HD TTS   Chronic C.  Diff infection   Hypothyroidism   Acute on chronic anemia   Hx of MDRO   Hx of Gastroparesis,     Inactive issues:  Hx Septic Emboli  Hx of anxiety and depression   Hx Vitamin D deficiency  Hx of hypertension  Hx of COVID-19 pneumonia  Hx of MDRO UTI  Hx of Gastroparesis   Hx of hypothyroidism    Hx of restless leg syndrome     Plan:  - Continue Lantus 1 unit and modified Sliding scale   - Follow POCT BG 4x daily and HS   - May give D5 normal saline infusions if hypoglycemic   - Hold anticoagulation, monitor H&H   - Follow Endo, ID and nephro recommendations   - Patient stable for transfer to regular floor       Silvia Howell MD, PGY-2    Attending physician: Dr. Kendal Meza personally saw, examined and provided care for the patient. Radiographs, labs and medication list were reviewed by me independently. I spoke with bedside nursing, therapists and consultants. Critical care services and times documented are independent of procedures and multidisciplinary rounds with Residents. Additionally comprehensive, multidisciplinary rounds were conducted with the MICU team. The case was discussed in detail and plans for care were established. Review of Residents documentation was conducted and revisions were made as appropriate. I agree with the above documented exam, problem list and plan of care.    Iram Figueroa MD   CCT excluding procedures :32'

## 2022-04-02 NOTE — PROGRESS NOTES
[facility]  Internal Medicine Residency Program  Progress Note - House Team 2    Patient:  Krysten Martinez 36 y.o. female MRN: [id number]     Date of Service: 4/2/2022     CC: AMS   Overnight events: NAEO     Subjective      On day 6 of hospital admission    Patient was seen at bed side in the AM during HD. Pt is awake and conversational. Continues to endorse abdominal pain, but denies n/v. States that she is having regular bowel movements. Denies fever/chills, angina/palpitations, dyspnea/cough/wheeze.  Discussed with nursing, plan for pt to attempt trial of diet this AM.    Per note review, precert pending back to [facility] - pt may be discharged when precert is complete, as she has stabilized from a medical perspective. Objective     Physical Exam:  · Vitals: BP (!) 143/90   Pulse 94   Temp 95.8 °F (35.4 °C)   Resp 13   Ht 5' 4\" (1.626 m)   Wt 142 lb 6.7 oz (64.6 kg)   SpO2 98%   BMI 24.45 kg/m²       · Constitutional: Sedated, intubated, awake and able to answer questions/follow commands appropriately. Follows commands. In no apparent distress. · Head: Normocephalic and atraumatic. · Eyes: EOMI, conjunctiva normal, (-) scleral icterus. Mucus membranes moist.  · Mouth: Mucus membranes moist. Oropharynx clear. No deviation of the tongue or uvula. Normal dentition. · Neck: (-)  swelling, (-) JVD, trachea midline  · Respiratory: ETT in place, lungs clear to auscultation bilaterally. (-)  wheezes, (-)  rales, (-)  rhonchi. No increased work of breathing or respiratory distress  · Cardiovascular: RRR. (-)  murmurs, (-) gallops,  (-) rubs. S1 and S2 were normal.   · GI:  Abdomen soft, non-tender, non-distended. (+) BS. (-) guarding, (-) rigidity. · Extremities: Warm and well perfused. (-) clubbing, (-) cyanosis. 2+ distal pulses. (-) peripheral edema. (-)Sacral pitting edema. · Neurologic:  No focal neurological deficits.   No tremor or overt seizure activity    Pertinent Labs & Imaging Studies   jorge alberto  CBC with Differential:    Lab Results   Component Value Date    WBC 5.3 04/02/2022    RBC 2.85 04/02/2022    HGB 7.2 04/02/2022    HCT 23.2 04/02/2022     04/02/2022    MCV 81.4 04/02/2022    MCH 25.3 04/02/2022    MCHC 31.0 04/02/2022    RDW 17.9 04/02/2022    NRBC 0.9 03/28/2022    SEGSPCT 67 06/27/2013    METASPCT 1.7 08/10/2020    LYMPHOPCT 16.2 04/02/2022    MONOPCT 4.5 04/02/2022    MYELOPCT 0.9 01/19/2022    BASOPCT 0.2 04/02/2022    MONOSABS 0.24 04/02/2022    LYMPHSABS 0.86 04/02/2022    EOSABS 0.04 04/02/2022    BASOSABS 0.01 04/02/2022     CMP:    Lab Results   Component Value Date     04/02/2022    K 3.9 04/02/2022    K 3.7 03/04/2022     04/02/2022    CO2 26 04/02/2022    BUN 24 04/02/2022    CREATININE 2.7 04/02/2022    GFRAA 25 04/02/2022    LABGLOM 25 04/02/2022    GLUCOSE 260 04/02/2022    GLUCOSE 130 05/18/2012    PROT 6.4 04/02/2022    LABALBU 2.6 04/02/2022    LABALBU 4.1 05/18/2012    CALCIUM 7.6 04/02/2022    BILITOT <0.2 04/02/2022    ALKPHOS 172 04/02/2022    AST 28 04/02/2022    ALT 26 04/02/2022     Magnesium:    Lab Results   Component Value Date    MG 2.2 04/02/2022     Phosphorus:    Lab Results   Component Value Date    PHOS 3.1 04/02/2022     Troponin:    Lab Results   Component Value Date    TROPONINI 0.12 05/14/2021     Resident's Assessment and Plan     Assessment     1. Acute metabolic encephalopathy-likely due to hypoglycemia in setting of DM1 and ESRD (CTH negative, negative UDS/SDS, NH3 normal, B12 normal 1 month ago)  2. HTN  3. Acute hypoxic respiratory failure  4. Aspiration pneumonia  5. ESRD on HD TTS  6. IDDM1-A1c 7.7% this admission  7. Hypothyroidism-TSH 5.37, FT4 1.10 normal  8. Acute on chronic anemia-s/p 1 unit PRBCs, chronic component likely due to ESRD  9. History of MDRO UTI  10.  History of C. difficile infection    Plan   Trial of diet this AM to ensure pt can take PO nutrition   BG controlled this morning on modified SSI per Endocrine   Per endocrinology, will attempt cosyntropin stimulation test at a later date when patient has been stabilized medically    D/c Hydrocortisone this AM - pt completed course    Continue Synthroid 88 mcg daily   Flagyl q8hrs per ID - date of last dose 4/4/2022   HD per nephro   Continue Abilify and Celexa   Appreciate Endo, nephro, infectious disease recommendations    PT/OT evaluation: Not indicated at this time  DVT prophylaxis/ GI prophylaxis: Heparin / Pepcid  Disposition: Continue MICU management     Belen Euceda DO, PGY-2  Attending physician: Dr. Bowles Shallow

## 2022-04-03 LAB
ALBUMIN SERPL-MCNC: 2.3 G/DL (ref 3.5–5.2)
ALP BLD-CCNC: 173 U/L (ref 35–104)
ALT SERPL-CCNC: 24 U/L (ref 0–32)
ANION GAP SERPL CALCULATED.3IONS-SCNC: 6 MMOL/L (ref 7–16)
ANION GAP SERPL CALCULATED.3IONS-SCNC: 6 MMOL/L (ref 7–16)
AST SERPL-CCNC: 33 U/L (ref 0–31)
BASOPHILS ABSOLUTE: 0.01 E9/L (ref 0–0.2)
BASOPHILS RELATIVE PERCENT: 0.2 % (ref 0–2)
BILIRUB SERPL-MCNC: <0.2 MG/DL (ref 0–1.2)
BILIRUBIN DIRECT: <0.2 MG/DL (ref 0–0.3)
BILIRUBIN, INDIRECT: ABNORMAL MG/DL (ref 0–1)
BLOOD BANK DISPENSE STATUS: NORMAL
BLOOD BANK PRODUCT CODE: NORMAL
BPU ID: NORMAL
BUN BLDV-MCNC: 15 MG/DL (ref 6–20)
BUN BLDV-MCNC: 19 MG/DL (ref 6–20)
CALCIUM IONIZED: 1.15 MMOL/L (ref 1.15–1.33)
CALCIUM SERPL-MCNC: 7.8 MG/DL (ref 8.6–10.2)
CALCIUM SERPL-MCNC: 7.9 MG/DL (ref 8.6–10.2)
CHLORIDE BLD-SCNC: 103 MMOL/L (ref 98–107)
CHLORIDE BLD-SCNC: 107 MMOL/L (ref 98–107)
CO2: 27 MMOL/L (ref 22–29)
CO2: 28 MMOL/L (ref 22–29)
CREAT SERPL-MCNC: 2.1 MG/DL (ref 0.5–1)
CREAT SERPL-MCNC: 2.5 MG/DL (ref 0.5–1)
DESCRIPTION BLOOD BANK: NORMAL
EOSINOPHILS ABSOLUTE: 0.06 E9/L (ref 0.05–0.5)
EOSINOPHILS RELATIVE PERCENT: 1.2 % (ref 0–6)
GFR AFRICAN AMERICAN: 27
GFR AFRICAN AMERICAN: 33
GFR NON-AFRICAN AMERICAN: 27 ML/MIN/1.73
GFR NON-AFRICAN AMERICAN: 33 ML/MIN/1.73
GLUCOSE BLD-MCNC: 224 MG/DL (ref 74–99)
GLUCOSE BLD-MCNC: 279 MG/DL (ref 74–99)
HCT VFR BLD CALC: 21.5 % (ref 34–48)
HCT VFR BLD CALC: 24.1 % (ref 34–48)
HCT VFR BLD CALC: 24.5 % (ref 34–48)
HCT VFR BLD CALC: 25.3 % (ref 34–48)
HEMOGLOBIN: 6.6 G/DL (ref 11.5–15.5)
HEMOGLOBIN: 7.6 G/DL (ref 11.5–15.5)
HEMOGLOBIN: 7.7 G/DL (ref 11.5–15.5)
HEMOGLOBIN: 8 G/DL (ref 11.5–15.5)
IMMATURE GRANULOCYTES #: 0.01 E9/L
IMMATURE GRANULOCYTES %: 0.2 % (ref 0–5)
LYMPHOCYTES ABSOLUTE: 1.15 E9/L (ref 1.5–4)
LYMPHOCYTES RELATIVE PERCENT: 22.7 % (ref 20–42)
MAGNESIUM: 2 MG/DL (ref 1.6–2.6)
MCH RBC QN AUTO: 25 PG (ref 26–35)
MCHC RBC AUTO-ENTMCNC: 30.7 % (ref 32–34.5)
MCV RBC AUTO: 81.4 FL (ref 80–99.9)
METER GLUCOSE: 128 MG/DL (ref 74–99)
METER GLUCOSE: 177 MG/DL (ref 74–99)
METER GLUCOSE: 250 MG/DL (ref 74–99)
METER GLUCOSE: 258 MG/DL (ref 74–99)
MONOCYTES ABSOLUTE: 0.25 E9/L (ref 0.1–0.95)
MONOCYTES RELATIVE PERCENT: 4.9 % (ref 2–12)
NEUTROPHILS ABSOLUTE: 3.58 E9/L (ref 1.8–7.3)
NEUTROPHILS RELATIVE PERCENT: 70.8 % (ref 43–80)
PDW BLD-RTO: 18.3 FL (ref 11.5–15)
PHOSPHORUS: 1.9 MG/DL (ref 2.5–4.5)
PLATELET # BLD: 199 E9/L (ref 130–450)
PMV BLD AUTO: 10.1 FL (ref 7–12)
POTASSIUM SERPL-SCNC: 4.5 MMOL/L (ref 3.5–5)
POTASSIUM SERPL-SCNC: 4.7 MMOL/L (ref 3.5–5)
RBC # BLD: 2.64 E12/L (ref 3.5–5.5)
SODIUM BLD-SCNC: 137 MMOL/L (ref 132–146)
SODIUM BLD-SCNC: 140 MMOL/L (ref 132–146)
TOTAL PROTEIN: 5.9 G/DL (ref 6.4–8.3)
WBC # BLD: 5.1 E9/L (ref 4.5–11.5)

## 2022-04-03 PROCEDURE — 82962 GLUCOSE BLOOD TEST: CPT

## 2022-04-03 PROCEDURE — 6370000000 HC RX 637 (ALT 250 FOR IP): Performed by: INTERNAL MEDICINE

## 2022-04-03 PROCEDURE — 2500000003 HC RX 250 WO HCPCS: Performed by: INTERNAL MEDICINE

## 2022-04-03 PROCEDURE — P9047 ALBUMIN (HUMAN), 25%, 50ML: HCPCS | Performed by: NURSE PRACTITIONER

## 2022-04-03 PROCEDURE — 85018 HEMOGLOBIN: CPT

## 2022-04-03 PROCEDURE — 99232 SBSQ HOSP IP/OBS MODERATE 35: CPT | Performed by: INTERNAL MEDICINE

## 2022-04-03 PROCEDURE — 85014 HEMATOCRIT: CPT

## 2022-04-03 PROCEDURE — 85025 COMPLETE CBC W/AUTO DIFF WBC: CPT

## 2022-04-03 PROCEDURE — 1200000000 HC SEMI PRIVATE

## 2022-04-03 PROCEDURE — S5553 INSULIN LONG ACTING 5 U: HCPCS | Performed by: INTERNAL MEDICINE

## 2022-04-03 PROCEDURE — 6360000002 HC RX W HCPCS: Performed by: NURSE PRACTITIONER

## 2022-04-03 PROCEDURE — 2580000003 HC RX 258: Performed by: INTERNAL MEDICINE

## 2022-04-03 PROCEDURE — 80076 HEPATIC FUNCTION PANEL: CPT

## 2022-04-03 PROCEDURE — 84100 ASSAY OF PHOSPHORUS: CPT

## 2022-04-03 PROCEDURE — 83735 ASSAY OF MAGNESIUM: CPT

## 2022-04-03 PROCEDURE — 6370000000 HC RX 637 (ALT 250 FOR IP): Performed by: STUDENT IN AN ORGANIZED HEALTH CARE EDUCATION/TRAINING PROGRAM

## 2022-04-03 PROCEDURE — 6370000000 HC RX 637 (ALT 250 FOR IP): Performed by: NURSE PRACTITIONER

## 2022-04-03 PROCEDURE — 6360000002 HC RX W HCPCS: Performed by: INTERNAL MEDICINE

## 2022-04-03 PROCEDURE — 82330 ASSAY OF CALCIUM: CPT

## 2022-04-03 PROCEDURE — 80048 BASIC METABOLIC PNL TOTAL CA: CPT

## 2022-04-03 PROCEDURE — 36430 TRANSFUSION BLD/BLD COMPNT: CPT

## 2022-04-03 RX ORDER — COSYNTROPIN 0.25 MG/ML
250 INJECTION, POWDER, FOR SOLUTION INTRAMUSCULAR; INTRAVENOUS ONCE
Status: COMPLETED | OUTPATIENT
Start: 2022-04-04 | End: 2022-04-04

## 2022-04-03 RX ORDER — VITAMIN B COMPLEX
1000 TABLET ORAL DAILY
Qty: 60 TABLET | Status: CANCELLED | DISCHARGE
Start: 2022-04-03

## 2022-04-03 RX ORDER — MECOBALAMIN 5000 MCG
5 TABLET,DISINTEGRATING ORAL NIGHTLY
Status: CANCELLED | DISCHARGE
Start: 2022-04-03

## 2022-04-03 RX ORDER — ALBUMIN (HUMAN) 12.5 G/50ML
25 SOLUTION INTRAVENOUS EVERY 8 HOURS
Status: COMPLETED | OUTPATIENT
Start: 2022-04-03 | End: 2022-04-04

## 2022-04-03 RX ORDER — HYOSCYAMINE SULFATE 0.125 MG
125 TABLET ORAL EVERY 4 HOURS PRN
Status: CANCELLED | OUTPATIENT
Start: 2022-04-03

## 2022-04-03 RX ORDER — INSULIN GLARGINE-YFGN 100 [IU]/ML
2 INJECTION, SOLUTION SUBCUTANEOUS NIGHTLY
Status: CANCELLED | DISCHARGE
Start: 2022-04-03

## 2022-04-03 RX ORDER — HYOSCYAMINE SULFATE 0.125 MG
125 TABLET ORAL EVERY 4 HOURS PRN
Status: DISCONTINUED | OUTPATIENT
Start: 2022-04-03 | End: 2022-04-04

## 2022-04-03 RX ORDER — METRONIDAZOLE 500 MG/1
500 TABLET ORAL EVERY 8 HOURS SCHEDULED
Qty: 6 TABLET | Refills: 0 | Status: CANCELLED | DISCHARGE
Start: 2022-04-03 | End: 2022-04-05

## 2022-04-03 RX ORDER — SODIUM CHLORIDE 9 MG/ML
INJECTION, SOLUTION INTRAVENOUS PRN
Status: DISCONTINUED | OUTPATIENT
Start: 2022-04-03 | End: 2022-04-13

## 2022-04-03 RX ORDER — LOPERAMIDE HYDROCHLORIDE 2 MG/1
2 CAPSULE ORAL 4 TIMES DAILY PRN
Status: CANCELLED | DISCHARGE
Start: 2022-04-03 | End: 2022-04-13

## 2022-04-03 RX ADMIN — ONDANSETRON 4 MG: 2 INJECTION INTRAMUSCULAR; INTRAVENOUS at 20:11

## 2022-04-03 RX ADMIN — SODIUM CHLORIDE, PRESERVATIVE FREE 100 UNITS: 5 INJECTION INTRAVENOUS at 09:27

## 2022-04-03 RX ADMIN — ALBUMIN (HUMAN) 25 G: 0.25 INJECTION, SOLUTION INTRAVENOUS at 10:27

## 2022-04-03 RX ADMIN — Medication 1000 UNITS: at 09:26

## 2022-04-03 RX ADMIN — ACETAMINOPHEN 650 MG: 325 TABLET ORAL at 09:42

## 2022-04-03 RX ADMIN — ESCITALOPRAM 10 MG: 10 TABLET, FILM COATED ORAL at 09:26

## 2022-04-03 RX ADMIN — DILTIAZEM HYDROCHLORIDE 90 MG: 30 TABLET, FILM COATED ORAL at 06:52

## 2022-04-03 RX ADMIN — MIRTAZAPINE 15 MG: 15 TABLET, FILM COATED ORAL at 21:13

## 2022-04-03 RX ADMIN — DILTIAZEM HYDROCHLORIDE 90 MG: 30 TABLET, FILM COATED ORAL at 13:55

## 2022-04-03 RX ADMIN — DILTIAZEM HYDROCHLORIDE 90 MG: 30 TABLET, FILM COATED ORAL at 19:00

## 2022-04-03 RX ADMIN — ONDANSETRON 4 MG: 2 INJECTION INTRAMUSCULAR; INTRAVENOUS at 09:34

## 2022-04-03 RX ADMIN — METRONIDAZOLE 500 MG: 500 TABLET ORAL at 13:55

## 2022-04-03 RX ADMIN — ROPINIROLE 0.25 MG: 0.25 TABLET, FILM COATED ORAL at 21:14

## 2022-04-03 RX ADMIN — DILTIAZEM HYDROCHLORIDE 90 MG: 30 TABLET, FILM COATED ORAL at 02:42

## 2022-04-03 RX ADMIN — HYOSCYAMINE SULFATE 125 MCG: 0.12 TABLET ORAL at 21:13

## 2022-04-03 RX ADMIN — Medication 10 ML: at 09:27

## 2022-04-03 RX ADMIN — METRONIDAZOLE 500 MG: 500 TABLET ORAL at 21:30

## 2022-04-03 RX ADMIN — ALBUMIN (HUMAN) 25 G: 0.25 INJECTION, SOLUTION INTRAVENOUS at 16:27

## 2022-04-03 RX ADMIN — METRONIDAZOLE 500 MG: 500 TABLET ORAL at 06:30

## 2022-04-03 RX ADMIN — INSULIN LISPRO 1 UNITS: 100 INJECTION, SOLUTION INTRAVENOUS; SUBCUTANEOUS at 06:56

## 2022-04-03 RX ADMIN — HYOSCYAMINE SULFATE 125 MCG: 0.12 TABLET ORAL at 02:42

## 2022-04-03 RX ADMIN — SODIUM CHLORIDE, PRESERVATIVE FREE 100 UNITS: 5 INJECTION INTRAVENOUS at 20:12

## 2022-04-03 RX ADMIN — Medication 10 ML: at 20:12

## 2022-04-03 RX ADMIN — INSULIN GLARGINE-YFGN 2 UNITS: 100 INJECTION, SOLUTION SUBCUTANEOUS at 20:11

## 2022-04-03 RX ADMIN — HYOSCYAMINE SULFATE 125 MCG: 0.12 TABLET ORAL at 09:26

## 2022-04-03 RX ADMIN — Medication 5 MG: at 21:14

## 2022-04-03 RX ADMIN — FAMOTIDINE 10 MG: 20 TABLET ORAL at 09:27

## 2022-04-03 RX ADMIN — LEVOTHYROXINE SODIUM 88 MCG: 0.09 TABLET ORAL at 06:53

## 2022-04-03 RX ADMIN — SODIUM PHOSPHATE, MONOBASIC, MONOHYDRATE 20 MMOL: 276; 142 INJECTION, SOLUTION INTRAVENOUS at 10:30

## 2022-04-03 RX ADMIN — ARIPIPRAZOLE 5 MG: 5 TABLET ORAL at 21:31

## 2022-04-03 RX ADMIN — INSULIN LISPRO 1 UNITS: 100 INJECTION, SOLUTION INTRAVENOUS; SUBCUTANEOUS at 20:11

## 2022-04-03 ASSESSMENT — PAIN SCALES - GENERAL
PAINLEVEL_OUTOF10: 8
PAINLEVEL_OUTOF10: 0
PAINLEVEL_OUTOF10: 9
PAINLEVEL_OUTOF10: 8
PAINLEVEL_OUTOF10: 3
PAINLEVEL_OUTOF10: 6
PAINLEVEL_OUTOF10: 0

## 2022-04-03 ASSESSMENT — PAIN DESCRIPTION - PAIN TYPE
TYPE: ACUTE PAIN

## 2022-04-03 ASSESSMENT — PAIN DESCRIPTION - DESCRIPTORS
DESCRIPTORS: DISCOMFORT;CRAMPING;CONSTANT
DESCRIPTORS: CONSTANT;DISCOMFORT;CRAMPING
DESCRIPTORS: DISCOMFORT;CRAMPING
DESCRIPTORS: CRAMPING;DISCOMFORT

## 2022-04-03 ASSESSMENT — PAIN DESCRIPTION - LOCATION
LOCATION: ABDOMEN

## 2022-04-03 ASSESSMENT — PAIN DESCRIPTION - PROGRESSION
CLINICAL_PROGRESSION: NOT CHANGED

## 2022-04-03 ASSESSMENT — PAIN DESCRIPTION - ONSET
ONSET: ON-GOING

## 2022-04-03 ASSESSMENT — PAIN SCALES - WONG BAKER
WONGBAKER_NUMERICALRESPONSE: 0
WONGBAKER_NUMERICALRESPONSE: 0

## 2022-04-03 ASSESSMENT — PAIN - FUNCTIONAL ASSESSMENT
PAIN_FUNCTIONAL_ASSESSMENT: PREVENTS OR INTERFERES SOME ACTIVE ACTIVITIES AND ADLS

## 2022-04-03 ASSESSMENT — PAIN DESCRIPTION - ORIENTATION
ORIENTATION: MID
ORIENTATION: MID

## 2022-04-03 ASSESSMENT — PAIN DESCRIPTION - FREQUENCY
FREQUENCY: CONTINUOUS

## 2022-04-03 NOTE — PROGRESS NOTES
[address] [facility]  Department of Internal Medicine   Internal Medicine Residency   MICU Progress Note    Patient:  Fidel Coon 36 y.o. female  MRN: [id number]     Date of Service: 4/3/2022    Allergy: Cefepime and Toradol [ketorolac tromethamine]    Subjective     Patient seen and examined at bedside this morning  She is alert and awake, has some mild crampy generalized abdominal pain unchanged from previous, last bowel movement was last night per patient. Hg was hovering slightly above 7 for the last few days, this morning, hg 6.6, 1 U RBC to transfuse  Precert is pendin to [place] to [facility], stable to transfer to medical floor     Objective     VS: BP (!) 134/91   Pulse 90   Temp 97.6 °F (36.4 °C) (Oral)   Resp 16   Ht 5' 4\" (1.626 m)   Wt 141 lb 1.5 oz (64 kg)   SpO2 97%   BMI 24.22 kg/m²           I & O - 24hr:     Intake/Output Summary (Last 24 hours) at 4/3/2022 0919  Last data filed at 4/3/2022 0600  Gross per 24 hour   Intake 520 ml   Output 1800 ml   Net -1280 ml       Physical Exam:  · General Appearance: alert, appears stated age and cooperative  · Neck: no adenopathy, no carotid bruit, no JVD, supple, symmetrical, trachea midline and thyroid not enlarged, symmetric, no tenderness/mass/nodules  · Lung: clear to auscultation bilaterally  · Heart: regular rate and rhythm, S1, S2 normal, no murmur, click, rub or gallop  · Abdomen: soft, non-tender; bowel sounds normal; no masses,  no organomegaly  · Extremities:  extremities normal, atraumatic, no cyanosis or edema  · Musculoskeletal: No joint swelling, no muscle tenderness. ROM normal in all joints of extremities.    · Neurologic: Mental status: Alert, oriented, thought content appropriate    Lines     site day    Art line   None    TLC None    PICC None    Hemoaccess R IJ    Oxygen:      Room air   ABG:     Lab Results   Component Value Date    PHART 7.345 07/20/2012    PH 7.501 03/30/2022    PH 7.290 09/08/2012    [id number] 35.0 10/29/2019    PCO2 31.9 2022    PO2ART 91.5 10/29/2019    PO2 126.8 2022    AWU2MWU 14.0 02/15/2021    HCO3 24.4 2022    BE 1.4 2022    THB 8.4 2022    [id number] 97.7 2012    O2SAT 98.8 2022        Medications     Infusions: (Fluid, Sedation, Vasopressors)  IVF:    None   Vasopressors   None   Sedation   None     ATB:   Antibiotics  Days   Metronidazole               Patient currently has     Isolation  DVT prophylaxis/ GI prophylaxis,    PT/OT  SNF/NH placement    Labs     CBC:   Lab Results   Component Value Date    WBC 5.1 2022    RBC 2.64 2022    HGB 6.6 2022    HCT 21.5 2022    MCV 81.4 2022    MCH 25.0 2022    MCHC 30.7 2022    RDW 18.3 2022     2022    MPV 10.1 2022     CMP:    Lab Results   Component Value Date     2022    K 4.5 2022    K 3.7 2022     2022    CO2 27 2022    BUN 15 2022    CREATININE 2.1 2022    GFRAA 33 2022    LABGLOM 33 2022    GLUCOSE 279 2022    GLUCOSE 130 2012    PROT 5.9 2022    LABALBU 2.3 2022    LABALBU 4.1 2012    CALCIUM 7.8 2022    BILITOT <0.2 2022    ALKPHOS 173 2022    AST 33 2022    ALT 24 2022       Resident's Assessment and Plan     Jarome Kocher is a 36year old female with PMhx of uncontrolled T1DM, ESRD on HD, Hypothyroidism, prior endocarditis with septic emboli, Anemia of chronic disease. Who we are currently managing for the followin. Brittle IDDM1 Hba1c 7.7% with severe fluctuations of blood glucoses  2. Need to evaluate for  adrenal insufficiency, Dr. Miriam Castellanos on the case, ? Autoimmune Polyendocrine syndrome type 2, or Shmidth syndrome  3. Acute metabolic encephaloapathy likely 2/2 to hypoglycemia - improved   4. Acute respiratory failure 2/2 to AMS, possible aspiration PNA, s/p extubation   5. HTN   6.  ESRD on HD TTS 7. Chronic C. Diff infection   8. Hypothyroidism   9. Acute on chronic anemia   10. Hx of MDRO   11. Hx of Gastroparesis,     Inactive issues:  1. Hx Septic Emboli  2. Hx of anxiety and depression   3. Hx Vitamin D deficiency  4. Hx of hypertension  5. Hx of COVID-19 pneumonia  6. Hx of MDRO UTI  7. Hx of Gastroparesis   8. Hx of hypothyroidism    9. Hx of restless leg syndrome     Plan:  - Continue Lantus 2 unit and modified Sliding scale   - Follow POCT BG 4x daily and HS   - May give D5 normal saline infusions if hypoglycemic  - on fragyle per ID   - Hold anticoagulation, monitor H&H, transfuse as needed  - Follow ID and nephro recommendations   - Plan to perform cosyntropin stim test per endo  - Patient stable for transfer to regular floor       Orly Rosario MD, PGY-2    Attending physician: Dr. Caridad Vick personally saw, examined and provided care for the patient. Radiographs, labs and medication list were reviewed by me independently. I spoke with bedside nursing, therapists and consultants. Critical care services and times documented are independent of procedures and multidisciplinary rounds with Residents. Additionally comprehensive, multidisciplinary rounds were conducted with the MICU team. The case was discussed in detail and plans for care were established. Review of Residents documentation was conducted and revisions were made as appropriate. I agree with the above documented exam, problem list and plan of care.   Vonda Adams MD   CCT excluding procedures:31

## 2022-04-03 NOTE — PROGRESS NOTES
[facility]  Internal Medicine Residency Program  Progress Note - House Team 2    Patient:  Catherine Painting 36 y.o. female MRN: [id number]     Date of Service: 4/3/2022     CC: AMS   Overnight events: NAEO     Subjective      On day 7 of hospital admission    Patient was seen at bed side in the AM. Endorses continued abdominal pain, which was alleviated ovn w/ one dose of Fentanyl. Pt states that she was unable to finish her breakfast this AM d/t abdominal discomfort. Denies any other acute complaints/concerns.  Per note review, precert pending back to [facility] - pt may be discharged when precert is complete, as she has stabilized from a medical perspective. Objective     Physical Exam:  · Vitals: BP (!) 136/90   Pulse 92   Temp 97.5 °F (36.4 °C)   Resp 18   Ht 5' 4\" (1.626 m)   Wt 141 lb 1.5 oz (64 kg)   SpO2 96%   BMI 24.22 kg/m²       · Constitutional: Awake, alert, able to answer questions. Follows commands. In no apparent distress. · Head: Normocephalic and atraumatic. · Eyes: EOMI, conjunctiva normal, (-) scleral icterus. Mucus membranes moist.  · Mouth: Mucus membranes moist. Oropharynx clear. No deviation of the tongue or uvula. Normal dentition. · Neck: (-)  swelling, (-) JVD, trachea midline  · Respiratory: Lungs clear to auscultation bilaterally. (-)  wheezes, (-)  rales, (-)  rhonchi. No increased work of breathing or respiratory distress  · Cardiovascular: RRR. (-)  murmurs, (-) gallops,  (-) rubs. S1 and S2 were normal.   · GI:  Abdomen soft, non-tender, non-distended. (+) BS. (-) guarding, (-) rigidity. · Extremities: Warm and well perfused. (-) clubbing, (-) cyanosis. 2+ distal pulses. (-) peripheral edema. (-)Sacral pitting edema. · Neurologic:  No focal neurological deficits.   No tremor or overt seizure activity    Pertinent Labs & Imaging Studies   jorge alberto  CBC with Differential:    Lab Results   Component Value Date    WBC 5.1 04/03/2022    RBC 2.64 04/03/2022    HGB 8.0 04/03/2022    HCT 25.3 04/03/2022     04/03/2022    MCV 81.4 04/03/2022    MCH 25.0 04/03/2022    MCHC 30.7 04/03/2022    RDW 18.3 04/03/2022    NRBC 0.9 03/28/2022    SEGSPCT 67 06/27/2013    METASPCT 1.7 08/10/2020    LYMPHOPCT 22.7 04/03/2022    MONOPCT 4.9 04/03/2022    MYELOPCT 0.9 01/19/2022    BASOPCT 0.2 04/03/2022    MONOSABS 0.25 04/03/2022    LYMPHSABS 1.15 04/03/2022    EOSABS 0.06 04/03/2022    BASOSABS 0.01 04/03/2022     CMP:    Lab Results   Component Value Date     04/03/2022    K 4.5 04/03/2022    K 3.7 03/04/2022     04/03/2022    CO2 27 04/03/2022    BUN 15 04/03/2022    CREATININE 2.1 04/03/2022    GFRAA 33 04/03/2022    LABGLOM 33 04/03/2022    GLUCOSE 279 04/03/2022    GLUCOSE 130 05/18/2012    PROT 5.9 04/03/2022    LABALBU 2.3 04/03/2022    LABALBU 4.1 05/18/2012    CALCIUM 7.8 04/03/2022    BILITOT <0.2 04/03/2022    ALKPHOS 173 04/03/2022    AST 33 04/03/2022    ALT 24 04/03/2022     Magnesium:    Lab Results   Component Value Date    MG 2.0 04/03/2022     Phosphorus:    Lab Results   Component Value Date    PHOS 1.9 04/03/2022     Troponin:    Lab Results   Component Value Date    TROPONINI 0.12 05/14/2021     Resident's Assessment and Plan     Assessment     1. Acute metabolic encephalopathy-likely due to hypoglycemia in setting of DM1 and ESRD (CTH negative, negative UDS/SDS, NH3 normal, B12 normal 1 month ago)  2. HTN  3. ESRD on HD TTS  4. IDDM1-A1c 7.7% this admission  5. Hypothyroidism-TSH 5.37, FT4 1.10 normal  6. Acute on chronic anemia-s/p 1 unit PRBCs, chronic component likely due to ESRD  7. History of MDRO UTI  8. History of C. difficile infection    Resolved/Stable Problems:   · Acute hypoxic respiratory failure   · Aspiration pneumonia     Plan   Pepcid and Zofran to aid w/ abdominal pain/dyspepsia - continue endorsing PO diet   Per Endocrine, increase to 2U Lantus nightly w/ modified SSI. Mila Oh w/ Endocrine - Dr. Wen Mosquera.  Plan to continue f/u as outpatient when pt discharged.  Pt will likely need cosyntropin stimulation test as outpatient    Continue Synthroid 88 mcg daily   Flagyl q8hrs per ID - date of last dose 4/4/2022   HD per nephro   Continue Abilify and Celexa   Appreciate Endo, nephro, infectious disease recommendations    PT/OT evaluation: Not indicated at this time  DVT prophylaxis/ GI prophylaxis: Heparin / Pepcid  Disposition: Continue MICU management     Dakota Razo DO, PGY-2  Attending physician: Dr. Adrian Segundo

## 2022-04-03 NOTE — PROGRESS NOTES
Department of Internal Medicine  Infectious Diseases  Progress  Note      C/C : Aspiration pneumonia     Pt is extubated   Awake and alert  Denies fever or chills  Reports SOB   Afebrile       Current Facility-Administered Medications   Medication Dose Route Frequency Provider Last Rate Last Admin    hyoscyamine (ANASPAZ;LEVSIN) tablet 125 mcg  125 mcg Oral Q4H PRN Ermelinda Tyler MD   125 mcg at 04/03/22 0242    0.9 % sodium chloride infusion   IntraVENous PRN Ermelinda Tyler MD        loperamide (IMODIUM) capsule 2 mg  2 mg Oral 4x Daily PRN Tu Villarreal MD   2 mg at 04/02/22 2111    insulin glargine-yfgn (SEMGLEE-YFGN) injection vial 2 Units  2 Units SubCUTAneous Nightly Romulo Sargent MD   2 Units at 04/02/22 2102    insulin lispro (HUMALOG) injection vial 0-3 Units  0-3 Units SubCUTAneous 4x Daily AC & HS Romulo Sargent MD   1 Units at 04/03/22 0656    0.9 % sodium chloride infusion   IntraVENous PRN Jacinda Littlejohn DO        [Held by provider] sevelamer (RENVELA) tablet 800 mg  800 mg Oral TID  HEBER Gandhi - CNP   800 mg at 04/02/22 6321    metroNIDAZOLE (FLAGYL) tablet 500 mg  500 mg Oral 3 times per day Erasmo Singh MD   500 mg at 04/03/22 0630    sodium chloride flush 0.9 % injection 5-40 mL  5-40 mL IntraVENous 2 times per day Lloyd Bertrand MD   10 mL at 04/02/22 2123    sodium chloride flush 0.9 % injection 5-40 mL  5-40 mL IntraVENous PRN Lloyd Bertrand MD        0.9 % sodium chloride infusion   IntraVENous PRN Lloyd Bertrand MD        heparin flush 100 UNIT/ML injection 100 Units  1 mL IntraVENous 2 times per day Lloyd Bertradn MD   100 Units at 04/02/22 2103    heparin flush 100 UNIT/ML injection 100 Units  1 mL IntraCATHeter PRN Lloyd Bertrand MD        ondansetron [facility]) injection 4 mg  4 mg IntraVENous Q6H PRN Stephon Alan MD   4 mg at 04/02/22 1724    white petrolatum ointment   Topical BID PRN Lloyd Bertrand MD       [facility] melatonin disintegrating tablet 5 mg  5 mg Oral Nightly Jacinda L Littlejhon, DO   5 mg at 04/02/22 2102    dilTIAZem (CARDIZEM) tablet 90 mg  90 mg Oral Q6H Rola Harris MD   90 mg at 04/03/22 8792    mirtazapine (REMERON) tablet 15 mg  15 mg Oral Nightly Jacinda L Littlejohn, DO   15 mg at 04/02/22 2102    escitalopram (LEXAPRO) tablet 10 mg  10 mg Oral Daily Jacinda L Littlejohn, DO   10 mg at 04/02/22 3511    ARIPiprazole (ABILIFY) tablet 5 mg  5 mg Oral Nightly Jacinda L Littlejohn, DO   5 mg at 04/02/22 2102    rOPINIRole (REQUIP) tablet 0.25 mg  0.25 mg Oral Nightly Jacinda L Littlejohn, DO   0.25 mg at 04/02/22 2102    polyethylene glycol (GLYCOLAX) packet 17 g  17 g Oral Daily PRN Lorry Hind, DO        acetaminophen (TYLENOL) tablet 650 mg  650 mg Oral Q6H PRN Lorry Hind, DO   650 mg at 04/01/22 2011    Or    acetaminophen (TYLENOL) suppository 650 mg  650 mg Rectal Q6H PRN Jacinda L Littlejohn, DO        glucose (GLUTOSE) 40 % oral gel 15 g  15 g Oral PRN Jacinda L Littlejohn, DO        dextrose 50 % IV solution  12.5 g IntraVENous PRN Jacinda L Littlejohn, DO   12.5 g at 03/28/22 0358    glucagon (rDNA) injection 1 mg  1 mg IntraMUSCular PRN Jacinda L Littlejohn, DO        dextrose 5 % solution  100 mL/hr IntraVENous PRN Jacinda L Littlejohn, DO        albuterol (PROVENTIL) nebulizer solution 2.5 mg  2.5 mg Nebulization Q6H PRN Cecil Santos MD        levothyroxine (SYNTHROID) tablet 88 mcg  88 mcg Oral Daily Gumaro Gerber MD   88 mcg at 04/03/22 0653    famotidine (PEPCID) tablet 10 mg  10 mg Oral Daily Rachel Kinney MD   10 mg at 04/02/22 2237    epoetin nena-epbx (RETACRIT) injection 3,000 Units  3,000 Units SubCUTAneous Once per day on Mon Wed Fri HEBER Medrano CNP   3,000 Units at 04/01/22 3423    Vitamin D (CHOLECALCIFEROL) tablet 1,000 Units  1,000 Units Per NG tube Daily Uriel HEBER Loving CNP   1,000 Units at 04/02/22 0838    labetalol (NORMODYNE;TRANDATE) injection 5 mg  5 mg IntraVENous Q6H PRN Laura Mohamud MD           REVIEW OF SYSTEMS:      CONSTITUTIONAL: Denies fever or chills   HEENT: Denies sore throat   RESPIRATORY: SOB   CARDIOVASCULAR:  Denies palpitation  GASTROINTESTINAL: Denies abdomen pain , no diarrhea   GENITOURINARY:  ESRD on HD   INTEGUMENT: No rash    HEMATOLOGIC/LYMPHATIC:  No bleeding . MUSCULOSKELETAL:  Denies leg swelling    NEUROLOGICAL:  denies loss of consciousness or weakness        PHYSICAL EXAM:      Vitals:     /82   Pulse 93   Temp 97.5 °F (36.4 °C)   Resp 16   Ht 5' 4\" (1.626 m)   Wt 141 lb 1.5 oz (64 kg)   SpO2 96%   BMI 24.22 kg/m²       General Appearance:    Extubated, awake , alert, no distress    Head:    Normocephalic, atraumatic   Eyes:    +  pallor, no icterus,   Ears:    No obvious deformity or drainage.    Nose:   No nasal drainage   Throat:   Mucosa moist    Neck:   Supple, no lymphadenopathy   Lungs:     Crackles +   Heart:    Tachycardia    Abdomen:     Soft, non-tender, bowel sounds present    Extremities:   No edema,   Pulses:   Dorsalis pedis palpable    Skin:   no rashes , right chest Tesio catheter    CBC with Differential:      Lab Results   Component Value Date    WBC 5.1 04/03/2022    RBC 2.64 04/03/2022    HGB 6.6 04/03/2022    HCT 21.5 04/03/2022     04/03/2022    MCV 81.4 04/03/2022    MCH 25.0 04/03/2022    MCHC 30.7 04/03/2022    RDW 18.3 04/03/2022    NRBC 0.9 03/28/2022    SEGSPCT 67 06/27/2013    METASPCT 1.7 08/10/2020    LYMPHOPCT 22.7 04/03/2022    MONOPCT 4.9 04/03/2022    MYELOPCT 0.9 01/19/2022    BASOPCT 0.2 04/03/2022    MONOSABS 0.25 04/03/2022    LYMPHSABS 1.15 04/03/2022    EOSABS 0.06 04/03/2022    BASOSABS 0.01 04/03/2022       CMP     Lab Results   Component Value Date     04/03/2022    K 4.5 04/03/2022    K 3.7 03/04/2022     04/03/2022    CO2 27 04/03/2022    BUN 15 04/03/2022    CREATININE 2.1 04/03/2022    GFRAA 33 04/03/2022    LABGLOM 33 04/03/2022 GLUCOSE 279 04/03/2022    GLUCOSE 130 05/18/2012    PROT 5.9 04/03/2022    LABALBU 2.3 04/03/2022    LABALBU 4.1 05/18/2012    CALCIUM 7.8 04/03/2022    BILITOT <0.2 04/03/2022    ALKPHOS 173 04/03/2022    AST 33 04/03/2022    ALT 24 04/03/2022         Hepatic Function Panel:    Lab Results   Component Value Date    ALKPHOS 173 04/03/2022    ALT 24 04/03/2022    AST 33 04/03/2022    PROT 5.9 04/03/2022    BILITOT <0.2 04/03/2022    BILIDIR <0.2 04/03/2022    IBILI see below 04/03/2022    LABALBU 2.3 04/03/2022    LABALBU 4.1 05/18/2012       PT/INR:    Lab Results   Component Value Date    PROTIME 12.1 03/31/2022    PROTIME 13.6 08/14/2011    INR 1.1 03/31/2022       TSH:    Lab Results   Component Value Date    TSH 5.370 03/28/2022       U/A:    Lab Results   Component Value Date    COLORU Yellow 03/28/2022    PHUR 8.5 03/28/2022    LABCAST RARE 01/12/2020    WBCUA >20 03/28/2022    WBCUA 0-1 05/18/2012    RBCUA 5-10 03/28/2022    RBCUA 1-3 08/01/2013    MUCUS Present 02/12/2016    TRICHOMONAS Present 07/17/2020    YEAST RARE 05/24/2018    BACTERIA MANY 03/28/2022    CLARITYU Clear 03/28/2022    SPECGRAV 1.015 03/28/2022    LEUKOCYTESUR LARGE 03/28/2022    UROBILINOGEN 0.2 03/28/2022    BILIRUBINUR Negative 03/28/2022    BILIRUBINUR SMALL 05/18/2012    BLOODU MODERATE 03/28/2022    GLUCOSEU 100 03/28/2022    GLUCOSEU >=1000 05/18/2012    AMORPHOUS RARE 11/01/2019       ABG:    Lab Results   Component Value Date    FDT3BYD 14.0 02/15/2021    V7VAOOWJ 97.7 09/08/2012    PHART 7.345 07/20/2012    JQL3ENG 35.0 10/29/2019    PO2ART 91.5 10/29/2019       MICROBIOLOGY:    Blood culture - neg to date   Sputum -  Specimen: Sputum, Suctioned Updated: 03/29/22 0826      CULTURE, RESPIRATORY Oral Pharyngeal Celine present    Smear, Respiratory --    Few Polymorphonuclear leukocytes   Rare Epithelial cells   Few Gram positive coccobacillary organisms    Narrative:             Radiology :    Chest X ray : LLL infiltrates IMPRESSION:     1. Aspiration pneumonia, elevated procalcitonin, leukocytosis   2. Resp failure - improved    3. Hx of recurrent C diff infection     RECOMMENDATIONS:      1. Flagyl  po 500 mg q 8 hrs ( hx of recurrent C diff infection )  X 1 day    2.  OK to discharge from ID POV - ID sign off

## 2022-04-03 NOTE — PROGRESS NOTES
Department of Internal Medicine  Nephrology Progress Note      Events reviewed. SUBJECTIVE:  We are following Miss Archie Reynoso for ESKD on HD. Patient reports no complaints.     PHYSICAL EXAM:      Vitals:    VITALS:  /88   Pulse 90   Temp 97.6 °F (36.4 °C) (Oral)   Resp (!) 5   Ht 5' 4\" (1.626 m)   Wt 141 lb 1.5 oz (64 kg)   SpO2 97%   BMI 24.22 kg/m²   24HR INTAKE/OUTPUT:      Intake/Output Summary (Last 24 hours) at 4/3/2022 0936  Last data filed at 4/3/2022 0600  Gross per 24 hour   Intake 520 ml   Output 1800 ml   Net -1280 ml       Access: RIJ tunneled dialysis catheter  Constitutional: Awake, alert, NAD  HEENT:  PERRL, normocephalic, atraumatic  Respiratory:  CTA bilateral  Cardiovascular/Edema:  ST, RRR, no murmur gallop or rub  Gastrointestinal:  abd distended, c/o persistent abdominal pain  Neurologic: A&OX3 follows commands, no focal deficit  Skin:  Warm, dry, ecchymotic areas to abdomen    Other:    Scheduled Meds:   insulin lispro  0-4 Units SubCUTAneous 4x Daily AC & HS    sodium phosphate IVPB  20 mmol IntraVENous Once    insulin glargine  2 Units SubCUTAneous Nightly    [Held by provider] sevelamer  800 mg Oral TID WC    metroNIDAZOLE  500 mg Oral 3 times per day    sodium chloride flush  5-40 mL IntraVENous 2 times per day    heparin flush  1 mL IntraVENous 2 times per day    melatonin  5 mg Oral Nightly    dilTIAZem  90 mg Oral Q6H    mirtazapine  15 mg Oral Nightly    escitalopram  10 mg Oral Daily    ARIPiprazole  5 mg Oral Nightly    rOPINIRole  0.25 mg Oral Nightly    levothyroxine  88 mcg Oral Daily    famotidine  10 mg Oral Daily    epoetin nena-epbx  3,000 Units SubCUTAneous Once per day on Mon Wed Fri    Vitamin D  1,000 Units Per NG tube Daily     Continuous Infusions:   sodium chloride      sodium chloride      sodium chloride      dextrose       PRN Meds:.hyoscyamine, sodium chloride, loperamide, sodium chloride, sodium chloride flush, sodium chloride, heparin flush, ondansetron, white petrolatum, polyethylene glycol, acetaminophen **OR** acetaminophen, glucose, dextrose, glucagon (rDNA), dextrose, albuterol, labetalol    DATA:    CBC:   Lab Results   Component Value Date    WBC 5.1 04/03/2022    RBC 2.64 04/03/2022    HGB 6.6 04/03/2022    HCT 21.5 04/03/2022    MCV 81.4 04/03/2022    MCH 25.0 04/03/2022    MCHC 30.7 04/03/2022    RDW 18.3 04/03/2022     04/03/2022    MPV 10.1 04/03/2022     CMP:    Lab Results   Component Value Date     04/03/2022    K 4.5 04/03/2022    K 3.7 03/04/2022     04/03/2022    CO2 27 04/03/2022    BUN 15 04/03/2022    CREATININE 2.1 04/03/2022    GFRAA 33 04/03/2022    LABGLOM 33 04/03/2022    GLUCOSE 279 04/03/2022    GLUCOSE 130 05/18/2012    PROT 5.9 04/03/2022    LABALBU 2.3 04/03/2022    LABALBU 4.1 05/18/2012    CALCIUM 7.8 04/03/2022    BILITOT <0.2 04/03/2022    ALKPHOS 173 04/03/2022    AST 33 04/03/2022    ALT 24 04/03/2022     Magnesium:    Lab Results   Component Value Date    MG 2.0 04/03/2022     Phosphorus:    Lab Results   Component Value Date    PHOS 1.9 04/03/2022     Radiology Review:      CT Head WO Contrast March 27, 2022   No acute intracranial abnormality or hemorrhage.         CT Abdomen Pelvis WO Contrast March 27, 2022   1.  Moderate ascites throughout abdomen and pelvis.       2.  Multiple thick walled loops of small bowel throughout the abdomen could   suggest nonspecific infectious or inflammatory enteritis.       3.  Generalized body wall edema.       4.  Small bilateral pleural effusions with adjacent atelectatic changes.             Chest X-Ray March 28, 2022   Infiltrate and or atelectasis at the left lower lobe.  Substantially resolved   infiltrate and or atelectasis on the right.  New NG tube.               BRIEF SUMMARY OF INITIAL CONSULT:    Briefly, Miss Cheryl Feliciano is a 36year-old female with a PMH of ESRD on hemodialysis 3 days/wk, on TTS schedule at [facility] I DM, poorly controlled with recurrent admissions for DKA, HTN, gastroparesis, ascites, infective endocarditis, cardiac arrest, hypothyroidism and depression who was admitted on March 27, 2022 after she was found unresponsive at the SNF where she resides. Patient was found to be profoundly hypoglycemic and EMS was called. She was intubated in ER for airway protection as she remained unresponsive. CT head showed no acute intracranial abnormality or hemorrhage, chest x-ray demonstrates right perihilar opacity concerning for aspiration pneumonia. She was ultimately admitted to MICU for further evaluation. Labs on admission were significant for sodium 135, potassium 5.0, chloride 100, bicarbonate 25, BUN 38, creatinine 4.9, proBNP >70,000. We are consulted for dialysis management. Problems resolved. · Hypoglycemia, was on D10, now hyperglycemic with +ketones (beta-hydroxybutyrate >4.5)  · Acute respiratory failure, 2/2 aspiration pneumonia? On 40% Fio2. Extubated 5/46  · Acute metabolic encephalopathy 2/2 hypoglycemia. Resolving   · Acidemia, pH 7.311, PCO2 35.0, HCO3 16.5, metabolic acidosis from DKA    IMPRESSION/RECOMMENDATIONS:      1. ESRD 3 times a week TTS via RIJ tunneled dialysis catheter. HD initiated September 2021 after failed peritoneal dialysis with persistent hyperkalemia. For dialysis three times/wk TTS while inpatient. 2. HTN, on cardizem 90 mg po qid  3. MBD of CKD, currently with hypophosphatemia. Binders d/c'd  4. Anemia of CKD, Hgb 6.5 mg/dL on admission, continue epoetin alpha 3,000 unit 3 times/wk. Hgb 6.6 today, receiving transfusion   5. Vitamin D deficiency, on ergocalciferol 1000 po daily  6. Adrenal insufficiency? Plan is for cosyntropin stim test. On stress does steroids  ------------------------------------------------------  7. Type I DM, poorly controlled with frequent readmissions for DKA, on SSI, lantus decreased to 1 unit nightly  8. Aspiration pneumonia?  S/p piperacillin 9. Recent Covid 19 infection, unvaccinated  10. Restless leg syndrome, on ropinirole at home  11. Depression, on escitalopram and Abilify  12. Hypothyroidism, on levothyroxine   13. History of gastroparesis, on metoclopramide at home. For EGD and pyloric dilatation with Botox injection outpatient per GI.  14. Hx recurrent C. difficile infection, on flagyl po Q 8hrs X 3 days  15.  Hx of MDRO UTI  16. Nutrition, diabetic diet     Plan:     · Continue HD today and three times/wk, TTS while inpatient  · Continue epoetin alpha 3,000 units 3 times/wk  · Continue to monitor labs daily   · Continue to monitor glucose levels  · Continue to monitor calcium levels  · Monitor phosphorus levels daily,hodl sevelamer today  · Monitor H&H, transfuse <7, receiving transfusion  · Replace phosphorus (1.9) with sodium phosphate 20 mmol  · Give albumin 25g Q8 hrs X 3doses  · Discharge planning    Electronically signed by HEBER Berg CNP on 4/3/2022 at 9:36 AM    Patient seen and examined and discussed with RN  Agree with above as annotated  Phuong SPRINGER

## 2022-04-03 NOTE — PROGRESS NOTES
[facility]  Internal Medicine Clinic    Attending Physician Statement:  Blanca Washington. CHUY. I have discussed the case, including pertinent history and exam findings with the resident. I agree with the assessment, plan and orders as documented by the resident. Patient here for routine follow up of medical problems. IDDM2: multiple episodes of hypoglycemia as outpatient; endocrinology evaluating patient at this time; continue treatment per their recs; BG elevated at this time 2/2 corticosteroids; End-stage renal disease on hemodialysis: Nephrology consulted and for dialysis on a TTS schedule, care per their recommendations,     History of recurrent C. difficile infections: Patient currently receiving Flagyl for one more day    For DC when accepted at facility     Remainder of medical problems as per resident note.

## 2022-04-03 NOTE — PLAN OF CARE
Problem: Skin Integrity:  Goal: Will show no infection signs and symptoms  Description: Will show no infection signs and symptoms  Outcome: Met This Shift  Goal: Absence of new skin breakdown  Description: Absence of new skin breakdown  Outcome: Met This Shift     Problem: Breathing Pattern - Ineffective:  Goal: Ability to achieve and maintain a regular respiratory rate will improve  Description: Ability to achieve and maintain a regular respiratory rate will improve  Outcome: Met This Shift     Problem: Cardiac Output - Decreased:  Goal: Hemodynamic stability will improve  Description: Hemodynamic stability will improve  Outcome: Met This Shift     Problem: Pain:  Goal: Pain level will decrease  Description: Pain level will decrease  Outcome: Met This Shift  Goal: Control of acute pain  Description: Control of acute pain  Outcome: Met This Shift  Goal: Control of chronic pain  Description: Control of chronic pain  Outcome: Met This Shift     Problem: Serum Glucose Level - Abnormal:  Goal: Ability to maintain appropriate glucose levels has stabilized  Description: Ability to maintain appropriate glucose levels has stabilized  Outcome: Not Met This Shift     Problem: Diarrhea:  Goal: Bowel elimination is within specified parameters  Description: Bowel elimination is within specified parameters  Outcome: Not Met This Shift  Goal: Passage of soft, formed stool  Description: Passage of soft, formed stool  Outcome: Not Met This Shift  Goal: Establishment of normal bowel function will improve to within specified parameters  Description: Establishment of normal bowel function will improve to within specified parameters  Outcome: Not Met This Shift

## 2022-04-04 LAB
ALBUMIN SERPL-MCNC: 3.3 G/DL (ref 3.5–5.2)
ALP BLD-CCNC: 138 U/L (ref 35–104)
ALT SERPL-CCNC: 21 U/L (ref 0–32)
ANION GAP SERPL CALCULATED.3IONS-SCNC: 10 MMOL/L (ref 7–16)
ANION GAP SERPL CALCULATED.3IONS-SCNC: 7 MMOL/L (ref 7–16)
AST SERPL-CCNC: 33 U/L (ref 0–31)
BASOPHILS ABSOLUTE: 0.02 E9/L (ref 0–0.2)
BASOPHILS RELATIVE PERCENT: 0.4 % (ref 0–2)
BILIRUB SERPL-MCNC: 0.4 MG/DL (ref 0–1.2)
BILIRUBIN DIRECT: <0.2 MG/DL (ref 0–0.3)
BILIRUBIN, INDIRECT: ABNORMAL MG/DL (ref 0–1)
BLOOD BANK DISPENSE STATUS: NORMAL
BLOOD BANK PRODUCT CODE: NORMAL
BPU ID: NORMAL
BUN BLDV-MCNC: 22 MG/DL (ref 6–20)
BUN BLDV-MCNC: 22 MG/DL (ref 6–20)
CALCIUM IONIZED: 1.17 MMOL/L (ref 1.15–1.33)
CALCIUM SERPL-MCNC: 8.1 MG/DL (ref 8.6–10.2)
CALCIUM SERPL-MCNC: 8.2 MG/DL (ref 8.6–10.2)
CHLORIDE BLD-SCNC: 104 MMOL/L (ref 98–107)
CHLORIDE BLD-SCNC: 104 MMOL/L (ref 98–107)
CO2: 26 MMOL/L (ref 22–29)
CO2: 27 MMOL/L (ref 22–29)
CORTISOL TOTAL: 13.89 MCG/DL (ref 2.68–18.4)
CORTISOL TOTAL: 17 MCG/DL (ref 2.68–18.4)
CORTISOL TOTAL: 8.68 MCG/DL (ref 2.68–18.4)
CREAT SERPL-MCNC: 2.8 MG/DL (ref 0.5–1)
CREAT SERPL-MCNC: 3.2 MG/DL (ref 0.5–1)
DESCRIPTION BLOOD BANK: NORMAL
EOSINOPHILS ABSOLUTE: 0.15 E9/L (ref 0.05–0.5)
EOSINOPHILS RELATIVE PERCENT: 2.9 % (ref 0–6)
GFR AFRICAN AMERICAN: 21
GFR AFRICAN AMERICAN: 24
GFR NON-AFRICAN AMERICAN: 21 ML/MIN/1.73
GFR NON-AFRICAN AMERICAN: 24 ML/MIN/1.73
GLUCOSE BLD-MCNC: 25 MG/DL (ref 74–99)
GLUCOSE BLD-MCNC: 256 MG/DL (ref 74–99)
HCT VFR BLD CALC: 24.3 % (ref 34–48)
HCT VFR BLD CALC: 24.5 % (ref 34–48)
HCT VFR BLD CALC: 25.4 % (ref 34–48)
HEMOGLOBIN: 7.7 G/DL (ref 11.5–15.5)
HEMOGLOBIN: 7.8 G/DL (ref 11.5–15.5)
HEMOGLOBIN: 8 G/DL (ref 11.5–15.5)
IMMATURE GRANULOCYTES #: 0.02 E9/L
IMMATURE GRANULOCYTES %: 0.4 % (ref 0–5)
LYMPHOCYTES ABSOLUTE: 0.92 E9/L (ref 1.5–4)
LYMPHOCYTES RELATIVE PERCENT: 17.9 % (ref 20–42)
MAGNESIUM: 2.1 MG/DL (ref 1.6–2.6)
MCH RBC QN AUTO: 26.1 PG (ref 26–35)
MCHC RBC AUTO-ENTMCNC: 31.4 % (ref 32–34.5)
MCV RBC AUTO: 83.1 FL (ref 80–99.9)
METER GLUCOSE: 137 MG/DL (ref 74–99)
METER GLUCOSE: 157 MG/DL (ref 74–99)
METER GLUCOSE: 160 MG/DL (ref 74–99)
METER GLUCOSE: 164 MG/DL (ref 74–99)
METER GLUCOSE: 183 MG/DL (ref 74–99)
METER GLUCOSE: 248 MG/DL (ref 74–99)
METER GLUCOSE: 255 MG/DL (ref 74–99)
METER GLUCOSE: 41 MG/DL (ref 74–99)
METER GLUCOSE: <40 MG/DL (ref 74–99)
MONOCYTES ABSOLUTE: 0.19 E9/L (ref 0.1–0.95)
MONOCYTES RELATIVE PERCENT: 3.7 % (ref 2–12)
NEUTROPHILS ABSOLUTE: 3.85 E9/L (ref 1.8–7.3)
NEUTROPHILS RELATIVE PERCENT: 74.7 % (ref 43–80)
PDW BLD-RTO: 18.1 FL (ref 11.5–15)
PHOSPHORUS: 3 MG/DL (ref 2.5–4.5)
PLATELET # BLD: 208 E9/L (ref 130–450)
PMV BLD AUTO: 11 FL (ref 7–12)
POTASSIUM SERPL-SCNC: 4.4 MMOL/L (ref 3.5–5)
POTASSIUM SERPL-SCNC: 4.9 MMOL/L (ref 3.5–5)
RBC # BLD: 2.95 E12/L (ref 3.5–5.5)
REASON FOR REJECTION: NORMAL
REJECTED TEST: NORMAL
SODIUM BLD-SCNC: 137 MMOL/L (ref 132–146)
SODIUM BLD-SCNC: 141 MMOL/L (ref 132–146)
TOTAL PROTEIN: 6.3 G/DL (ref 6.4–8.3)
WBC # BLD: 5.2 E9/L (ref 4.5–11.5)

## 2022-04-04 PROCEDURE — 85014 HEMATOCRIT: CPT

## 2022-04-04 PROCEDURE — 82533 TOTAL CORTISOL: CPT

## 2022-04-04 PROCEDURE — 6370000000 HC RX 637 (ALT 250 FOR IP): Performed by: INTERNAL MEDICINE

## 2022-04-04 PROCEDURE — 82962 GLUCOSE BLOOD TEST: CPT

## 2022-04-04 PROCEDURE — 6370000000 HC RX 637 (ALT 250 FOR IP): Performed by: STUDENT IN AN ORGANIZED HEALTH CARE EDUCATION/TRAINING PROGRAM

## 2022-04-04 PROCEDURE — 6370000000 HC RX 637 (ALT 250 FOR IP): Performed by: NURSE PRACTITIONER

## 2022-04-04 PROCEDURE — 6360000002 HC RX W HCPCS: Performed by: NURSE PRACTITIONER

## 2022-04-04 PROCEDURE — 36415 COLL VENOUS BLD VENIPUNCTURE: CPT

## 2022-04-04 PROCEDURE — 2580000003 HC RX 258: Performed by: INTERNAL MEDICINE

## 2022-04-04 PROCEDURE — 84100 ASSAY OF PHOSPHORUS: CPT

## 2022-04-04 PROCEDURE — 6360000002 HC RX W HCPCS: Performed by: INTERNAL MEDICINE

## 2022-04-04 PROCEDURE — 2500000003 HC RX 250 WO HCPCS: Performed by: INTERNAL MEDICINE

## 2022-04-04 PROCEDURE — 82330 ASSAY OF CALCIUM: CPT

## 2022-04-04 PROCEDURE — 80048 BASIC METABOLIC PNL TOTAL CA: CPT

## 2022-04-04 PROCEDURE — 99232 SBSQ HOSP IP/OBS MODERATE 35: CPT | Performed by: INTERNAL MEDICINE

## 2022-04-04 PROCEDURE — 85018 HEMOGLOBIN: CPT

## 2022-04-04 PROCEDURE — P9047 ALBUMIN (HUMAN), 25%, 50ML: HCPCS | Performed by: NURSE PRACTITIONER

## 2022-04-04 PROCEDURE — 1200000000 HC SEMI PRIVATE

## 2022-04-04 PROCEDURE — 83735 ASSAY OF MAGNESIUM: CPT

## 2022-04-04 PROCEDURE — 85025 COMPLETE CBC W/AUTO DIFF WBC: CPT

## 2022-04-04 PROCEDURE — 80076 HEPATIC FUNCTION PANEL: CPT

## 2022-04-04 RX ORDER — DEXTROSE MONOHYDRATE 100 MG/ML
INJECTION, SOLUTION INTRAVENOUS CONTINUOUS
Status: DISCONTINUED | OUTPATIENT
Start: 2022-04-04 | End: 2022-04-04

## 2022-04-04 RX ORDER — METRONIDAZOLE 500 MG/1
500 TABLET ORAL EVERY 8 HOURS SCHEDULED
Status: COMPLETED | OUTPATIENT
Start: 2022-04-04 | End: 2022-04-04

## 2022-04-04 RX ORDER — INSULIN GLARGINE-YFGN 100 [IU]/ML
1 INJECTION, SOLUTION SUBCUTANEOUS NIGHTLY
Status: DISCONTINUED | OUTPATIENT
Start: 2022-04-04 | End: 2022-04-06

## 2022-04-04 RX ORDER — DICYCLOMINE HYDROCHLORIDE 10 MG/1
10 CAPSULE ORAL 3 TIMES DAILY PRN
Status: DISCONTINUED | OUTPATIENT
Start: 2022-04-04 | End: 2022-04-06

## 2022-04-04 RX ADMIN — Medication 10 ML: at 22:27

## 2022-04-04 RX ADMIN — MIRTAZAPINE 15 MG: 15 TABLET, FILM COATED ORAL at 22:27

## 2022-04-04 RX ADMIN — Medication 1000 UNITS: at 10:42

## 2022-04-04 RX ADMIN — HYOSCYAMINE SULFATE 125 MCG: 0.12 TABLET ORAL at 02:01

## 2022-04-04 RX ADMIN — FAMOTIDINE 10 MG: 20 TABLET ORAL at 10:42

## 2022-04-04 RX ADMIN — DILTIAZEM HYDROCHLORIDE 90 MG: 30 TABLET, FILM COATED ORAL at 22:17

## 2022-04-04 RX ADMIN — ROPINIROLE 0.25 MG: 0.25 TABLET, FILM COATED ORAL at 22:17

## 2022-04-04 RX ADMIN — DILTIAZEM HYDROCHLORIDE 90 MG: 30 TABLET, FILM COATED ORAL at 15:29

## 2022-04-04 RX ADMIN — HYOSCYAMINE SULFATE 125 MCG: 0.12 TABLET ORAL at 10:47

## 2022-04-04 RX ADMIN — SODIUM CHLORIDE, PRESERVATIVE FREE 100 UNITS: 5 INJECTION INTRAVENOUS at 10:43

## 2022-04-04 RX ADMIN — METRONIDAZOLE 500 MG: 500 TABLET ORAL at 15:30

## 2022-04-04 RX ADMIN — Medication 10 ML: at 10:42

## 2022-04-04 RX ADMIN — LOPERAMIDE HYDROCHLORIDE 2 MG: 2 CAPSULE ORAL at 22:18

## 2022-04-04 RX ADMIN — ARIPIPRAZOLE 5 MG: 5 TABLET ORAL at 22:17

## 2022-04-04 RX ADMIN — METRONIDAZOLE 500 MG: 500 TABLET ORAL at 06:37

## 2022-04-04 RX ADMIN — DEXTROSE MONOHYDRATE: 100 INJECTION, SOLUTION INTRAVENOUS at 06:51

## 2022-04-04 RX ADMIN — SODIUM CHLORIDE, PRESERVATIVE FREE 100 UNITS: 5 INJECTION INTRAVENOUS at 22:18

## 2022-04-04 RX ADMIN — ACETAMINOPHEN 650 MG: 325 TABLET ORAL at 10:47

## 2022-04-04 RX ADMIN — DILTIAZEM HYDROCHLORIDE 90 MG: 30 TABLET, FILM COATED ORAL at 06:37

## 2022-04-04 RX ADMIN — Medication 12.5 G: at 06:27

## 2022-04-04 RX ADMIN — LEVOTHYROXINE SODIUM 88 MCG: 0.09 TABLET ORAL at 06:37

## 2022-04-04 RX ADMIN — DICYCLOMINE HYDROCHLORIDE 10 MG: 10 CAPSULE ORAL at 17:42

## 2022-04-04 RX ADMIN — ESCITALOPRAM 10 MG: 10 TABLET, FILM COATED ORAL at 10:42

## 2022-04-04 RX ADMIN — ALBUMIN (HUMAN) 25 G: 0.25 INJECTION, SOLUTION INTRAVENOUS at 01:11

## 2022-04-04 RX ADMIN — DILTIAZEM HYDROCHLORIDE 90 MG: 30 TABLET, FILM COATED ORAL at 01:06

## 2022-04-04 RX ADMIN — EPOETIN ALFA-EPBX 3000 UNITS: 3000 INJECTION, SOLUTION INTRAVENOUS; SUBCUTANEOUS at 15:30

## 2022-04-04 RX ADMIN — COSYNTROPIN 250 MCG: 0.25 INJECTION, POWDER, LYOPHILIZED, FOR SOLUTION INTRAVENOUS at 10:51

## 2022-04-04 RX ADMIN — METRONIDAZOLE 500 MG: 500 TABLET ORAL at 22:48

## 2022-04-04 RX ADMIN — Medication 12.5 G: at 06:34

## 2022-04-04 ASSESSMENT — PAIN SCALES - GENERAL
PAINLEVEL_OUTOF10: 8
PAINLEVEL_OUTOF10: 0

## 2022-04-04 ASSESSMENT — PAIN SCALES - WONG BAKER
WONGBAKER_NUMERICALRESPONSE: 0
WONGBAKER_NUMERICALRESPONSE: 0

## 2022-04-04 ASSESSMENT — PAIN DESCRIPTION - PROGRESSION: CLINICAL_PROGRESSION: NOT CHANGED

## 2022-04-04 NOTE — PROGRESS NOTES
8213: glucometer result critical low BG <40, pt drowsy, orientedx3. Tolerating PO intake - 240cc apple juice given. Vitals stable, no other changes. Blood draw sent to lab to verify   0625: glucometer recheck BG 41, D50 25g given, spoke with Dr. Dionne Patrick. order to start D10 @100  0640: Dr. Dionne Patrick at bedside, . Verbal order to start D10 @ 20 and titrate to -120.  Orders to recheck BG q1 for 4 hours

## 2022-04-04 NOTE — PROGRESS NOTES
ENDOCRINOLOGY PROGRESS NOTE  Via Tele-Health Service       Date of admission: 3/27/2022  Date of service: 4/4/2022  Admitting physician: Kate Alexander DO   Primary Care Physician: Luis Enrique Garber MD  Consultant physician: Symone William MD     Reason for the consultation:  DM type 1, labile diabetes     History of Present Illness: The history is provided from medical records, pt sedated intubated     Maria Antonia Farmer is a 36 y.o. old female nursing home resident with PMH of Type I DM, ESRD on PD,HTN,hypothyroidism, infective endocarditis and other listed below admitted to [facility] on 3/27/2022 because of AMS. Endocrine service was consulted for DM management.      subjective   No acute events, out of ICU, had sever hypoglycemia early this AM     Point of care glucose monitoring (Independently reviewed)   Recent Labs     04/04/22  0610 04/04/22  0625 04/04/22  0640 04/04/22  0703 04/04/22  0757 04/04/22  0905 04/04/22  1130 04/04/22  1745   GLUMET <40* 41* 157* 137* 160* 164* 183* 255*       Scheduled Meds:   metroNIDAZOLE  500 mg Oral 3 times per day    [Held by provider] insulin lispro  0-4 Units SubCUTAneous 4x Daily AC & HS    [Held by provider] insulin glargine  2 Units SubCUTAneous Nightly    sodium chloride flush  5-40 mL IntraVENous 2 times per day    heparin flush  1 mL IntraVENous 2 times per day    melatonin  5 mg Oral Nightly    dilTIAZem  90 mg Oral Q6H    mirtazapine  15 mg Oral Nightly    escitalopram  10 mg Oral Daily    ARIPiprazole  5 mg Oral Nightly    rOPINIRole  0.25 mg Oral Nightly    levothyroxine  88 mcg Oral Daily    famotidine  10 mg Oral Daily    epoetin nena-epbx  3,000 Units SubCUTAneous Once per day on Mon Wed Fri    Vitamin D  1,000 Units Per NG tube Daily     PRN Meds:   dicyclomine, 10 mg, TID PRN  sodium chloride, , PRN  loperamide, 2 mg, 4x Daily PRN  sodium chloride, , PRN  sodium chloride flush, 5-40 mL, PRN  sodium chloride, , PRN  heparin flush, 1 mL, PRN  ondansetron, 4 mg, Q6H PRN  white petrolatum, , BID PRN  polyethylene glycol, 17 g, Daily PRN  acetaminophen, 650 mg, Q6H PRN   Or  acetaminophen, 650 mg, Q6H PRN  glucose, 15 g, PRN  dextrose, 12.5 g, PRN  glucagon (rDNA), 1 mg, PRN  dextrose, 100 mL/hr, PRN  albuterol, 2.5 mg, Q6H PRN  labetalol, 5 mg, Q6H PRN      Continuous Infusions:   dextrose 50 mL/hr at 04/04/22 0800    sodium chloride      sodium chloride      sodium chloride      dextrose         Review of Systems  Non-obtainable     OBJECTIVE    /78   Pulse 94   Temp 97.7 °F (36.5 °C) (Oral)   Resp 17   Ht 5' 4\" (1.626 m)   Wt 141 lb 1.5 oz (64 kg)   SpO2 94%   BMI 24.22 kg/m²     Intake/Output Summary (Last 24 hours) at 4/4/2022 1912  Last data filed at 4/4/2022 1510  Gross per 24 hour   Intake 338.86 ml   Output --   Net 338.86 ml       Physical examination:  Due to this being a TeleHealth encounter, evaluation of the following organ systems is limited: Vitals/Constitutional/EENT/Resp/CV/GI//MS/Neuro/Skin/Heme-Lymph-Imm. Modified physical exam through Telemedicine camera    General: lethrgic  Neck: no obvious neck mass. No obvious neck deformity     CVS: no distress   Chest: no distress.  Chest is moving with respiration    Extremities:  no visible tremor  Skin: No visible rashes as seen from camera   Musculoskeletal: no visible deformity      Review of Laboratory Data:  I personally reviewed the following labs:   Recent Labs     04/02/22  0405 04/02/22  0405 04/03/22  0548 04/03/22  1033 04/03/22  2032 04/04/22  0540 04/04/22  1125   WBC 5.3  --  5.1  --   --  5.2  --    RBC 2.85*  --  2.64*  --   --  2.95*  --    HGB 7.2*   < > 6.6*   < > 7.7* 7.7* 8.0*   HCT 23.2*   < > 21.5*   < > 24.5* 24.5* 25.4*   MCV 81.4  --  81.4  --   --  83.1  --    MCH 25.3*  --  25.0*  --   --  26.1  --    MCHC 31.0*  --  30.7*  --   --  31.4*  --    RDW 17.9*  --  18.3*  --   --  18.1*  --      --  199  --   --  208  --    MPV 10.6  -- 10. 1  --   --  11.0  --     < > = values in this interval not displayed. Recent Labs     04/02/22  0405 04/02/22  1723 04/03/22  0548 04/03/22  0548 04/03/22  1813 04/04/22  0634 04/04/22  1750      < > 140   < > 137 141 137   K 3.9   < > 4.5   < > 4.7 4.4 4.9      < > 107   < > 103 104 104   CO2 26   < > 27   < > 28 27 26   BUN 24*   < > 15   < > 19 22* 22*   CREATININE 2.7*   < > 2.1*   < > 2.5* 2.8* 3.2*   GLUCOSE 260*   < > 279*   < > 224* 25* 256*   CALCIUM 7.6*   < > 7.8*   < > 7.9* 8.2* 8.1*   PROT 6.4  --  5.9*  --   --  6.3*  --    LABALBU 2.6*  --  2.3*  --   --  3.3*  --    BILITOT <0.2  --  <0.2  --   --  0.4  --    ALKPHOS 172*  --  173*  --   --  138*  --    AST 28  --  33*  --   --  33*  --    ALT 26  --  24  --   --  21  --     < > = values in this interval not displayed. Beta-Hydroxybutyrate   Date Value Ref Range Status   03/29/2022 >4.50 (H) 0.02 - 0.27 mmol/L Final   03/27/2022 0.10 0.02 - 0.27 mmol/L Final   03/02/2022 2.05 (H) 0.02 - 0.27 mmol/L Final     Lab Results   Component Value Date    LABA1C 7.7 03/02/2022    LABA1C 8.7 12/08/2021    LABA1C 10.2 11/23/2021     Lab Results   Component Value Date/Time    TSH 5.370 (H) 03/28/2022 01:56 AM    T4FREE 1.10 03/28/2022 01:56 AM    H5OORQG 8.6 11/05/2015 02:20 AM    FT3 1.3 (L) 10/31/2020 10:43 AM    D3BYYKV 36.73 (L) 07/20/2020 02:00 PM     Lab Results   Component Value Date    LABA1C 7.7 03/02/2022    GLUCOSE 256 04/04/2022    GLUCOSE 130 05/18/2012    MALBCR 2949.3 01/15/2020    LABMICR 1740.1 01/15/2020    LABCREA 59 01/15/2020    LABCREA 61 01/15/2020     Lab Results   Component Value Date    TRIG 82 01/14/2020    HDL 33 01/14/2020    LDLCALC 46 01/14/2020    CHOL 95 01/14/2020       Blood culture   Lab Results   Component Value Date    BC 5 Days no growth 03/28/2022    BC 5 Days no growth 03/27/2022       Radiology:  XR CHEST PORTABLE   Final Result   1. The lungs are clear. There is no infiltrate or effusion.    2. Stable position of the support lines and tubes. XR CHEST PORTABLE   Final Result   1. There is no acute cardiopulmonary disease   2. Stable position of the support lines and tubes. XR CHEST PORTABLE   Final Result   Infiltrate and or atelectasis at the left lower lobe. Substantially resolved   infiltrate and or atelectasis on the right. New NG tube. CT HEAD WO CONTRAST   Final Result   No acute intracranial abnormality or hemorrhage. CT ABDOMEN PELVIS WO CONTRAST Additional Contrast? None   Final Result   1. Moderate ascites throughout abdomen and pelvis. 2.  Multiple thick walled loops of small bowel throughout the abdomen could   suggest nonspecific infectious or inflammatory enteritis. 3.  Generalized body wall edema. 4.  Small bilateral pleural effusions with adjacent atelectatic changes. XR ABDOMEN FOR NG/OG/NE TUBE PLACEMENT   Final Result   Enteric tube tip in the body of the stomach. XR CHEST PORTABLE   Final Result   Right perihilar opacity. XR CHEST PORTABLE    (Results Pending)       Medical Records/Labs/Images review:   I personally reviewed and summarized previous records   All labs and imaging were reviewed independently     [address] Nikolay Maldonado, a 36 y.o.-old female seen today for inpatient diabetes management     Diabetes Mellitus type I    · Extremely insulin sensitive.  Pt is type 1 diabetic and needs long acting insulin all the time   · Had sever hypoglycemia this AM with just 2u of lantus   · we recommend the following sq insulin regimen   · Lantus ONE units daily at night   · Modified Low dose sliding scale (1u:100>200) with meals  · No bedtime sliding scale   · Continue following BG and adjust insulin regimen    Evaluation for adrenal insufficiency   · With type 1 AM and primary hypothyroidism, will need to r/o AI in this patient (Autoimmune Polyendocrine syndrome type 2, or Shmidth syndrome)   Last dose of Hydrocortisone was yesterday morning. Will perform  Cosyntropin stim test      primary Hypothyroidism  · Levothyroxine was adjusted during this admission to 88 mcg daily      ESRD  · Pt at risk for hypoglycemia  · On dialysis, nephrology following    Thank you for allowing us to participate in the care of this patient. Please do not hesitate to contact us with any additional questions. Melina Hodgkins MD  Endocrinologist, [place] [address] [facility]   [address] [facility] [address]   Phone: [phone]  Fax: [phone]  ----------------------------  An electronic signature was used to authenticate this note.  Liza Yadav MD on 4/4/2022 at 7:12 PM

## 2022-04-04 NOTE — PROGRESS NOTES
[facility]  Internal Medicine Clinic    Attending Physician Statement:  Maria Luisa Feng. CHUY. I have discussed the case, including pertinent history and exam findings with the resident. I agree with the assessment, plan and orders as documented by the resident. Patient here for routine follow up of medical problems. IDDM2: multiple episodes of hypoglycemia as outpatient; endocrinology evaluating patient at this time; continue treatment per their recs; patient became hypoglycemic overnight 2/2 not eating for last 24 hours; patient stating that she is having abdominal cramping which improves with fentanyl; discussed with patient that we would not prescribe opiates for her abdominal pain; bentyl ordered; plan for conservative treatment 2/2 patient stating that she is defecating and urinating without difficulty     AI evaluation: cosyntropin given at 1051 today; awaiting endocrinology recs     End-stage renal disease on hemodialysis: Nephrology consulted and for dialysis on a TTS schedule, care per their recommendations,     History of recurrent C. difficile infections: ending flagyl treatment today     Remainder of medical problems as per resident note.

## 2022-04-04 NOTE — PROGRESS NOTES
[facility]  Internal Medicine Residency Program  Progress Note - House Team 2    Patient:  Bebeto Reece 36 y.o. female MRN: [id number]     Date of Service: 4/4/2022     CC: AMS   Overnight events: NAEO     Subjective      On day 8 of hospital admission    Overnight patient had glucose reading <40. Patient was given D50 and started on D10 drip. AM glucose 167.  Patient was seen at bed side in the AM. Patient seen to be lethargic this AM. patient continues to have concerns for abdominal pain.  Precert pending back to [facility]; will continue to monitor patient today    Objective     Physical Exam:  · Vitals: /78   Pulse 94   Temp 97.7 °F (36.5 °C) (Oral)   Resp 17   Ht 5' 4\" (1.626 m)   Wt 141 lb 1.5 oz (64 kg)   SpO2 94%   BMI 24.22 kg/m²       · Constitutional: Awake, alert, able to answer questions. Follows commands. In no apparent distress. · Head: Normocephalic and atraumatic. · Eyes: EOMI, conjunctiva normal, (-) scleral icterus. Mucus membranes moist.  · Mouth: Mucus membranes moist. Oropharynx clear. No deviation of the tongue or uvula. Normal dentition. · Neck: (-)  swelling, (-) JVD, trachea midline  · Respiratory: Lungs clear to auscultation bilaterally. (-)  wheezes, (-)  rales, (-)  rhonchi. No increased work of breathing or respiratory distress  · Cardiovascular: RRR. (-)  murmurs, (-) gallops,  (-) rubs. S1 and S2 were normal.   · GI:  Abdomen soft, non-tender, non-distended. (+) BS. (-) guarding, (-) rigidity. · Extremities: Warm and well perfused. (-) clubbing, (-) cyanosis. 2+ distal pulses. (-) peripheral edema. (-)Sacral pitting edema. · Neurologic:  No focal neurological deficits.   No tremor or overt seizure activity    Pertinent Labs & Imaging Studies   jorge alberto  CBC with Differential:    Lab Results   Component Value Date    WBC 5.2 04/04/2022    RBC 2.95 04/04/2022    HGB 8.0 04/04/2022    HCT 25.4 04/04/2022     04/04/2022    MCV 83.1 04/04/2022    MCH 26.1 04/04/2022    MCHC 31.4 04/04/2022    RDW 18.1 04/04/2022    NRBC 0.9 03/28/2022    SEGSPCT 67 06/27/2013    METASPCT 1.7 08/10/2020    LYMPHOPCT 17.9 04/04/2022    MONOPCT 3.7 04/04/2022    MYELOPCT 0.9 01/19/2022    BASOPCT 0.4 04/04/2022    MONOSABS 0.19 04/04/2022    LYMPHSABS 0.92 04/04/2022    EOSABS 0.15 04/04/2022    BASOSABS 0.02 04/04/2022     CMP:    Lab Results   Component Value Date     04/04/2022    K 4.4 04/04/2022    K 3.7 03/04/2022     04/04/2022    CO2 27 04/04/2022    BUN 22 04/04/2022    CREATININE 2.8 04/04/2022    GFRAA 24 04/04/2022    LABGLOM 24 04/04/2022    GLUCOSE 25 04/04/2022    GLUCOSE 130 05/18/2012    PROT 6.3 04/04/2022    LABALBU 3.3 04/04/2022    LABALBU 4.1 05/18/2012    CALCIUM 8.2 04/04/2022    BILITOT 0.4 04/04/2022    ALKPHOS 138 04/04/2022    AST 33 04/04/2022    ALT 21 04/04/2022     Magnesium:    Lab Results   Component Value Date    MG 2.1 04/04/2022     Phosphorus:    Lab Results   Component Value Date    PHOS 3.0 04/04/2022     Troponin:    Lab Results   Component Value Date    TROPONINI 0.12 05/14/2021     Resident's Assessment and Plan     Assessment     1. Acute metabolic encephalopathy-likely due to hypoglycemia in setting of DM1 and ESRD (CTH negative, negative UDS/SDS, NH3 normal, B12 normal 1 month ago)  2. HTN  3. ESRD on HD TTS  4. IDDM1-A1c 7.7% this admission  5. Hypothyroidism-TSH 5.37, FT4 1.10 normal  6. Acute on chronic anemia-s/p 1 unit PRBCs, chronic component likely due to ESRD  7. History of MDRO UTI  8. History of C. difficile infection    Resolved/Stable Problems:   · Acute hypoxic respiratory failure   · Aspiration pneumonia     Plan   Pepcid and Zofran to aid w/ abdominal pain/dyspepsia - continue endorsing PO diet   Per Endocrine, continue on 2U Lantus nightly w/ modified SSI. Isaiah Gates w/ Endocrine - Dr. Truong Hernandez. Plan to continue f/u as outpatient when pt discharged.  Pt will likely need cosyntropin stimulation test as outpatient    Continue Synthroid 88 mcg daily   Flagyl q8hrs per ID - date of last dose 4/4/2022 - today   HD TThS per nephro   Continue Abilify and Celexa   Glucose checks ACHS   Appreciate Endo, nephro, infectious disease recommendations    PT/OT evaluation: Not indicated at this time  DVT prophylaxis/ GI prophylaxis: Heparin / Pepcid  Disposition: Continue current care    Lurdes Swartz MD, PGY-1  Attending physician: Dr. Adrian Segundo

## 2022-04-04 NOTE — PROGRESS NOTES
[facility]  Internal Medicine Residency Program  Progress Note - House Team     Patient:  Radha Rowland 36 y.o. female MRN: [id number]     Date of Service: 4/4/2022     CC: Altered Mental Status    Subjective   Brief Summary:  Ms. Danielle Fontana is a 36year old female who was found obtunded and severely hypoglycemic at her nursing home on 3/27. She has a past medical history of ESRD on hemodialysis, poorly controlled TIDM, HTN, hypothyroidism, and depression. She was intubated for airway protection and admitted to the [place] [address] ICU admission and treatment with antibiotics and steroids, patient's status improved significantly. She is currently being managed for brittle type 1 diabetes and adrenal insufficiency workup.       Hospital Day: 9    Overnight Events:   Patient became hypoglycemic with blood glucose dropping to 40. She was given 25g of D50 and then started on 10% dextrose 20cc/hr with Q1 Blood glucose checks. Her most recent blood glucose was 160. Patient is still extremely lethargic. This AM     Patient was seen and examined this morning at bedside   Patient is extremely lethargic. She is answering questions and complying, however, endorses severe fatigue. Patient also endorses diffuse abdominal pain and diarrhea. She denies fevers, chills, nausea, vomiting, and chest pain. Objective     Physical Exam:  · Vitals: BP (!) 143/99   Pulse 98   Temp 97.3 °F (36.3 °C) (Temporal)   Resp 16   Ht 5' 4\" (1.626 m)   Wt 141 lb 1.5 oz (64 kg)   SpO2 92%   BMI 24.22 kg/m²     · I & O - 24hr: No intake/output data recorded. · General Appearance: appears stated age, cooperative, fatigued and slowed mentation  · Lung: clear to auscultation bilaterally  · Heart: regular rate and rhythm, S1, S2 normal, no murmur, click, rub or gallop  · Abdomen: mildly distended. LUQ/LLQ tenderness to palpation.    · Neurologic: Mental status: Alert, oriented, thought content appropriate  Subject  Pertinent Labs & Imaging Studies   jorge alberto  CBC:   Lab Results   Component Value Date    WBC 5.2 04/04/2022    RBC 2.95 04/04/2022    HGB 7.7 04/04/2022    HCT 24.5 04/04/2022    MCV 83.1 04/04/2022    MCH 26.1 04/04/2022    MCHC 31.4 04/04/2022    RDW 18.1 04/04/2022     04/04/2022    MPV 11.0 04/04/2022     CMP:    Lab Results   Component Value Date     04/04/2022    K 4.4 04/04/2022    K 3.7 03/04/2022     04/04/2022    CO2 27 04/04/2022    BUN 22 04/04/2022    CREATININE 2.8 04/04/2022    GFRAA 24 04/04/2022    LABGLOM 24 04/04/2022    GLUCOSE 25 04/04/2022    GLUCOSE 130 05/18/2012    PROT 6.3 04/04/2022    LABALBU 3.3 04/04/2022    LABALBU 4.1 05/18/2012    CALCIUM 8.2 04/04/2022    BILITOT 0.4 04/04/2022    ALKPHOS 138 04/04/2022    AST 33 04/04/2022    ALT 21 04/04/2022     ABG:    Lab Results   Component Value Date    PH 7.501 03/30/2022    PH 7.290 09/08/2012    PCO2 31.9 03/30/2022    PO2 126.8 03/30/2022    HCO3 24.4 03/30/2022    BE 1.4 03/30/2022    O2SAT 98.8 03/30/2022       XR CHEST PORTABLE   Final Result   1. The lungs are clear. There is no infiltrate or effusion. 2. Stable position of the support lines and tubes. XR CHEST PORTABLE   Final Result   1. There is no acute cardiopulmonary disease   2. Stable position of the support lines and tubes. XR CHEST PORTABLE   Final Result   Infiltrate and or atelectasis at the left lower lobe. Substantially resolved   infiltrate and or atelectasis on the right. New NG tube. CT HEAD WO CONTRAST   Final Result   No acute intracranial abnormality or hemorrhage. CT ABDOMEN PELVIS WO CONTRAST Additional Contrast? None   Final Result   1. Moderate ascites throughout abdomen and pelvis. 2.  Multiple thick walled loops of small bowel throughout the abdomen could   suggest nonspecific infectious or inflammatory enteritis. 3.  Generalized body wall edema.       4.  Small bilateral pleural effusions with adjacent atelectatic changes. XR ABDOMEN FOR NG/OG/NE TUBE PLACEMENT   Final Result   Enteric tube tip in the body of the stomach. XR CHEST PORTABLE   Final Result   Right perihilar opacity. XR CHEST PORTABLE    (Results Pending)        Resident's Assessment and Plan     Assessment and Plan:  1. Acute metabolic encephalopathy 2/2 hypoglycemia  - Resolved. - Low cortisol and ACTH - evidence of adrenal insuffiencey. - cosyntropin stimulation test scheduled for today.      2. TIDM  - Endo following.   - highly sensitive to insulin  - patient placed on a modified low dose sliding scale with a max dose of 2 units of Lantus daily. - patient hypoglycemic early this morning. Given 25mg D50 and started on D10 20cc/hr. - q1 glucose checks for 4 hours.     3. Aspiration Pneumonia  - resolved    4. Recurrent C. Difficile  - po flagyl 500mg TID. - last dose today. - patient complains of abdominal pain today. - pepcid/zofran for abdominal pain.        5. End Stage Renal Disease  - Continue dialysis Tues/Thurs/Saturday     6. Hypothyroidism  - continue levothyroxine 88mg daily.      7. Depression  - continue Abilify and Celexa    8.  Hypertension  - continue       Diet: ***  PT/OT evaluation: ***  DVT prophylaxis/ GI prophylaxis: ***  Disposition: continue current care / discharge to ***    Guillermo Hatfield, PGY-1  Attending physician: Dr. Itz Hernandez

## 2022-04-04 NOTE — PLAN OF CARE
Problem: Skin Integrity:  Goal: Will show no infection signs and symptoms  Description: Will show no infection signs and symptoms  Outcome: Met This Shift  Goal: Absence of new skin breakdown  Description: Absence of new skin breakdown  Outcome: Met This Shift     Problem: Serum Glucose Level - Abnormal:  Goal: Ability to maintain appropriate glucose levels has stabilized  Description: Ability to maintain appropriate glucose levels has stabilized  Outcome: Met This Shift     Problem: Breathing Pattern - Ineffective:  Goal: Ability to achieve and maintain a regular respiratory rate will improve  Description: Ability to achieve and maintain a regular respiratory rate will improve  Outcome: Met This Shift     Problem: Cardiac Output - Decreased:  Goal: Hemodynamic stability will improve  Description: Hemodynamic stability will improve  Outcome: Met This Shift     Problem: Diarrhea:  Goal: Bowel elimination is within specified parameters  Description: Bowel elimination is within specified parameters  Outcome: Met This Shift  Goal: Passage of soft, formed stool  Description: Passage of soft, formed stool  Outcome: Met This Shift  Goal: Establishment of normal bowel function will improve to within specified parameters  Description: Establishment of normal bowel function will improve to within specified parameters  Outcome: Met This Shift     Problem: Pain:  Goal: Pain level will decrease  Description: Pain level will decrease  Outcome: Met This Shift  Goal: Control of acute pain  Description: Control of acute pain  Outcome: Met This Shift  Goal: Control of chronic pain  Description: Control of chronic pain  Outcome: Met This Shift

## 2022-04-04 NOTE — PROGRESS NOTES
Department of Internal Medicine  Nephrology Progress Note      Events reviewed. SUBJECTIVE:  We are following Miss Nate Schumacher for ESKD on HD. Patient reports no complaints.     PHYSICAL EXAM:      Vitals:    VITALS:  BP (!) 143/99   Pulse 98   Temp 97.3 °F (36.3 °C) (Temporal)   Resp 16   Ht 5' 4\" (1.626 m)   Wt 141 lb 1.5 oz (64 kg)   SpO2 92%   BMI 24.22 kg/m²   24HR INTAKE/OUTPUT:      Intake/Output Summary (Last 24 hours) at 4/4/2022 0956  Last data filed at 4/4/2022 0910  Gross per 24 hour   Intake 338.86 ml   Output --   Net 338.86 ml       Access: RIJ tunneled dialysis catheter  Constitutional: Awake, alert, NAD  HEENT:  PERRL, normocephalic, atraumatic  Respiratory:  CTA bilateral  Cardiovascular/Edema:  ST, RRR, no murmur gallop or rub  Gastrointestinal:  abd distended, c/o persistent abdominal pain  Neurologic: A&OX3 follows commands, no focal deficit  Skin:  Warm, dry, ecchymotic areas to abdomen    Other:    Scheduled Meds:   metroNIDAZOLE  500 mg Oral 3 times per day    [Held by provider] insulin lispro  0-4 Units SubCUTAneous 4x Daily AC & HS    cosyntropin  250 mcg IntraVENous Once    [Held by provider] insulin glargine  2 Units SubCUTAneous Nightly    sodium chloride flush  5-40 mL IntraVENous 2 times per day    heparin flush  1 mL IntraVENous 2 times per day    melatonin  5 mg Oral Nightly    dilTIAZem  90 mg Oral Q6H    mirtazapine  15 mg Oral Nightly    escitalopram  10 mg Oral Daily    ARIPiprazole  5 mg Oral Nightly    rOPINIRole  0.25 mg Oral Nightly    levothyroxine  88 mcg Oral Daily    famotidine  10 mg Oral Daily    epoetin nena-epbx  3,000 Units SubCUTAneous Once per day on Mon Wed Fri    Vitamin D  1,000 Units Per NG tube Daily     Continuous Infusions:   dextrose 30 mL/hr at 04/04/22 0704    sodium chloride      sodium chloride      sodium chloride      dextrose       PRN Meds:.hyoscyamine, sodium chloride, loperamide, sodium chloride, sodium chloride flush, sodium chloride, heparin flush, ondansetron, white petrolatum, polyethylene glycol, acetaminophen **OR** acetaminophen, glucose, dextrose, glucagon (rDNA), dextrose, albuterol, labetalol    DATA:    CBC:   Lab Results   Component Value Date    WBC 5.2 04/04/2022    RBC 2.95 04/04/2022    HGB 7.7 04/04/2022    HCT 24.5 04/04/2022    MCV 83.1 04/04/2022    MCH 26.1 04/04/2022    MCHC 31.4 04/04/2022    RDW 18.1 04/04/2022     04/04/2022    MPV 11.0 04/04/2022     CMP:    Lab Results   Component Value Date     04/04/2022    K 4.4 04/04/2022    K 3.7 03/04/2022     04/04/2022    CO2 27 04/04/2022    BUN 22 04/04/2022    CREATININE 2.8 04/04/2022    GFRAA 24 04/04/2022    LABGLOM 24 04/04/2022    GLUCOSE 25 04/04/2022    GLUCOSE 130 05/18/2012    PROT 6.3 04/04/2022    LABALBU 3.3 04/04/2022    LABALBU 4.1 05/18/2012    CALCIUM 8.2 04/04/2022    BILITOT 0.4 04/04/2022    ALKPHOS 138 04/04/2022    AST 33 04/04/2022    ALT 21 04/04/2022     Magnesium:    Lab Results   Component Value Date    MG 2.1 04/04/2022     Phosphorus:    Lab Results   Component Value Date    PHOS 3.0 04/04/2022     Radiology Review:      CT Head WO Contrast March 27, 2022   No acute intracranial abnormality or hemorrhage.         CT Abdomen Pelvis WO Contrast March 27, 2022   1.  Moderate ascites throughout abdomen and pelvis.       2.  Multiple thick walled loops of small bowel throughout the abdomen could   suggest nonspecific infectious or inflammatory enteritis.       3.  Generalized body wall edema.       4.  Small bilateral pleural effusions with adjacent atelectatic changes.             Chest X-Ray March 28, 2022   Infiltrate and or atelectasis at the left lower lobe.  Substantially resolved   infiltrate and or atelectasis on the right.  New NG tube.               BRIEF SUMMARY OF INITIAL CONSULT:    Briefly, Miss Bettina Severe is a 36year-old female with a PMH of ESRD on hemodialysis 3 days/wk, on TTS schedule at [facility], Type I DM, poorly controlled with recurrent admissions for DKA, HTN, gastroparesis, ascites, infective endocarditis, cardiac arrest, hypothyroidism, recent Covid, and depression who was admitted on March 27, 2022 after she was found unresponsive at the SNF where she resides. Patient was found to be profoundly hypoglycemic and EMS was called. She was intubated in ER for airway protection as she remained unresponsive. CT head showed no acute intracranial abnormality or hemorrhage, chest x-ray demonstrates right perihilar opacity concerning for aspiration pneumonia. She was ultimately admitted to MICU for further evaluation. Labs on admission were significant for sodium 135, potassium 5.0, chloride 100, bicarbonate 25, BUN 38, creatinine 4.9, proBNP >70,000. We are consulted for dialysis management. Problems resolved. · Hypoglycemia, was on D10, now hyperglycemic with +ketones (beta-hydroxybutyrate >4.5)  · Acute respiratory failure, 2/2 aspiration pneumonia? On 40% Fio2. Extubated 1/80  · Acute metabolic encephalopathy 2/2 hypoglycemia. Resolving   · Acidemia, pH 7.311, PCO2 35.0, HCO3 92.6, metabolic acidosis from DKA    IMPRESSION/RECOMMENDATIONS:      1. ESKD 3 times a week TTS via RIJ tunneled dialysis catheter. HD initiated September 2021 after failed peritoneal dialysis with persistent hyperkalemia. For dialysis three times/wk TTS while inpatient. 2. HTN, on cardizem 90 mg po qid  3. MBD of CKD, currently with hypophosphatemia. Binders d/c'd  4. Anemia of CKD, continue epoetin alpha 3,000 unit 3 times/wk. s/p transfusion   5. Vitamin D deficiency, on ergocalciferol 1000 po daily  ------------------------------------------------------  6. Type I DM, poorly controlled with frequent readmissions for DKA, on SSI, lantus decreased to 1 unit nightly  7. Aspiration pneumonia? S/p piperacillin    8. Restless leg syndrome, on ropinirole at home  9.  Depression, on escitalopram and Abilify  10. Hypothyroidism, on levothyroxine   11. History of gastroparesis, on metoclopramide at home. For EGD and pyloric dilatation with Botox injection outpatient per GI.  12. Hx recurrent C. difficile infection, on flagyl po Q 8hrs   13.  Hx of MDRO UTI  14. Nutrition, diabetic diet     Plan:     · Continue HD today and three times/wk, TTS while inpatient  · Continue epoetin alpha 3,000 units 3 times/wk  · Continue to monitor labs daily   · Continue to monitor glucose levels  · Monitor phosphorus levels   · Monitor H&H, transfuse <7, receiving transfusion  · Discharge planning    Electronically signed by Kody Sears MD on 4/4/2022 at 9:56 AM

## 2022-04-05 LAB
ALBUMIN SERPL-MCNC: 3.1 G/DL (ref 3.5–5.2)
ALP BLD-CCNC: 152 U/L (ref 35–104)
ALT SERPL-CCNC: 19 U/L (ref 0–32)
ANION GAP SERPL CALCULATED.3IONS-SCNC: 11 MMOL/L (ref 7–16)
ANION GAP SERPL CALCULATED.3IONS-SCNC: 11 MMOL/L (ref 7–16)
AST SERPL-CCNC: 25 U/L (ref 0–31)
BASOPHILS ABSOLUTE: 0.01 E9/L (ref 0–0.2)
BASOPHILS RELATIVE PERCENT: 0.2 % (ref 0–2)
BILIRUB SERPL-MCNC: 0.3 MG/DL (ref 0–1.2)
BILIRUBIN DIRECT: <0.2 MG/DL (ref 0–0.3)
BILIRUBIN, INDIRECT: ABNORMAL MG/DL (ref 0–1)
BUN BLDV-MCNC: 26 MG/DL (ref 6–20)
BUN BLDV-MCNC: 9 MG/DL (ref 6–20)
CALCIUM IONIZED: 1.23 MMOL/L (ref 1.15–1.33)
CALCIUM SERPL-MCNC: 8.2 MG/DL (ref 8.6–10.2)
CALCIUM SERPL-MCNC: 8.3 MG/DL (ref 8.6–10.2)
CHLORIDE BLD-SCNC: 102 MMOL/L (ref 98–107)
CHLORIDE BLD-SCNC: 102 MMOL/L (ref 98–107)
CO2: 25 MMOL/L (ref 22–29)
CO2: 25 MMOL/L (ref 22–29)
CREAT SERPL-MCNC: 1.9 MG/DL (ref 0.5–1)
CREAT SERPL-MCNC: 3.7 MG/DL (ref 0.5–1)
EOSINOPHILS ABSOLUTE: 0.2 E9/L (ref 0.05–0.5)
EOSINOPHILS RELATIVE PERCENT: 3.6 % (ref 0–6)
GFR AFRICAN AMERICAN: 17
GFR AFRICAN AMERICAN: 38
GFR NON-AFRICAN AMERICAN: 17 ML/MIN/1.73
GFR NON-AFRICAN AMERICAN: 38 ML/MIN/1.73
GLUCOSE BLD-MCNC: 185 MG/DL (ref 74–99)
GLUCOSE BLD-MCNC: 243 MG/DL (ref 74–99)
HCT VFR BLD CALC: 24.2 % (ref 34–48)
HCT VFR BLD CALC: 24.2 % (ref 34–48)
HEMOGLOBIN: 7.7 G/DL (ref 11.5–15.5)
HEMOGLOBIN: 7.7 G/DL (ref 11.5–15.5)
IMMATURE GRANULOCYTES #: 0.01 E9/L
IMMATURE GRANULOCYTES %: 0.2 % (ref 0–5)
LYMPHOCYTES ABSOLUTE: 0.98 E9/L (ref 1.5–4)
LYMPHOCYTES RELATIVE PERCENT: 17.4 % (ref 20–42)
MAGNESIUM: 2.1 MG/DL (ref 1.6–2.6)
MCH RBC QN AUTO: 26 PG (ref 26–35)
MCHC RBC AUTO-ENTMCNC: 31.8 % (ref 32–34.5)
MCV RBC AUTO: 81.8 FL (ref 80–99.9)
METER GLUCOSE: 152 MG/DL (ref 74–99)
METER GLUCOSE: 160 MG/DL (ref 74–99)
METER GLUCOSE: 192 MG/DL (ref 74–99)
METER GLUCOSE: 223 MG/DL (ref 74–99)
MONOCYTES ABSOLUTE: 0.36 E9/L (ref 0.1–0.95)
MONOCYTES RELATIVE PERCENT: 6.4 % (ref 2–12)
NEUTROPHILS ABSOLUTE: 4.07 E9/L (ref 1.8–7.3)
NEUTROPHILS RELATIVE PERCENT: 72.2 % (ref 43–80)
PDW BLD-RTO: 18.4 FL (ref 11.5–15)
PHOSPHORUS: 3.4 MG/DL (ref 2.5–4.5)
PLATELET # BLD: 211 E9/L (ref 130–450)
PMV BLD AUTO: 10.1 FL (ref 7–12)
POTASSIUM SERPL-SCNC: 4.2 MMOL/L (ref 3.5–5)
POTASSIUM SERPL-SCNC: 5 MMOL/L (ref 3.5–5)
RBC # BLD: 2.96 E12/L (ref 3.5–5.5)
SODIUM BLD-SCNC: 138 MMOL/L (ref 132–146)
SODIUM BLD-SCNC: 138 MMOL/L (ref 132–146)
TOTAL PROTEIN: 6.1 G/DL (ref 6.4–8.3)
WBC # BLD: 5.6 E9/L (ref 4.5–11.5)

## 2022-04-05 PROCEDURE — 83735 ASSAY OF MAGNESIUM: CPT

## 2022-04-05 PROCEDURE — 6370000000 HC RX 637 (ALT 250 FOR IP): Performed by: INTERNAL MEDICINE

## 2022-04-05 PROCEDURE — 6370000000 HC RX 637 (ALT 250 FOR IP): Performed by: STUDENT IN AN ORGANIZED HEALTH CARE EDUCATION/TRAINING PROGRAM

## 2022-04-05 PROCEDURE — 90935 HEMODIALYSIS ONE EVALUATION: CPT

## 2022-04-05 PROCEDURE — 1200000000 HC SEMI PRIVATE

## 2022-04-05 PROCEDURE — 6360000002 HC RX W HCPCS: Performed by: INTERNAL MEDICINE

## 2022-04-05 PROCEDURE — 99232 SBSQ HOSP IP/OBS MODERATE 35: CPT | Performed by: INTERNAL MEDICINE

## 2022-04-05 PROCEDURE — 85014 HEMATOCRIT: CPT

## 2022-04-05 PROCEDURE — 36415 COLL VENOUS BLD VENIPUNCTURE: CPT

## 2022-04-05 PROCEDURE — 82962 GLUCOSE BLOOD TEST: CPT

## 2022-04-05 PROCEDURE — 2580000003 HC RX 258: Performed by: INTERNAL MEDICINE

## 2022-04-05 PROCEDURE — 80048 BASIC METABOLIC PNL TOTAL CA: CPT

## 2022-04-05 PROCEDURE — 82330 ASSAY OF CALCIUM: CPT

## 2022-04-05 PROCEDURE — 84100 ASSAY OF PHOSPHORUS: CPT

## 2022-04-05 PROCEDURE — 85018 HEMOGLOBIN: CPT

## 2022-04-05 PROCEDURE — 85025 COMPLETE CBC W/AUTO DIFF WBC: CPT

## 2022-04-05 PROCEDURE — 6370000000 HC RX 637 (ALT 250 FOR IP): Performed by: NURSE PRACTITIONER

## 2022-04-05 PROCEDURE — 80076 HEPATIC FUNCTION PANEL: CPT

## 2022-04-05 RX ORDER — LEVOTHYROXINE SODIUM 88 UG/1
88 TABLET ORAL DAILY
Qty: 30 TABLET | Refills: 3 | Status: CANCELLED | DISCHARGE
Start: 2022-04-05

## 2022-04-05 RX ORDER — PROMETHAZINE HYDROCHLORIDE 25 MG/1
25 TABLET ORAL EVERY 6 HOURS PRN
Status: CANCELLED | DISCHARGE
Start: 2022-04-05

## 2022-04-05 RX ORDER — OMEPRAZOLE 20 MG/1
20 CAPSULE, DELAYED RELEASE ORAL NIGHTLY
Qty: 30 CAPSULE | Refills: 3 | Status: CANCELLED | DISCHARGE
Start: 2022-04-05

## 2022-04-05 RX ORDER — HYDROXYZINE HYDROCHLORIDE 25 MG/1
25 TABLET, FILM COATED ORAL EVERY 8 HOURS PRN
Status: CANCELLED | DISCHARGE
Start: 2022-04-05

## 2022-04-05 RX ORDER — MIRTAZAPINE 15 MG/1
15 TABLET, FILM COATED ORAL NIGHTLY
Qty: 30 TABLET | Refills: 3 | Status: CANCELLED | DISCHARGE
Start: 2022-04-05

## 2022-04-05 RX ORDER — ONDANSETRON 4 MG/1
4 TABLET, FILM COATED ORAL EVERY 8 HOURS PRN
Status: CANCELLED | DISCHARGE
Start: 2022-04-05

## 2022-04-05 RX ORDER — PREGABALIN 50 MG/1
50 CAPSULE ORAL 2 TIMES DAILY
Qty: 60 CAPSULE | Refills: 0 | Status: CANCELLED | DISCHARGE
Start: 2022-04-05 | End: 2022-05-05

## 2022-04-05 RX ORDER — ESCITALOPRAM OXALATE 10 MG/1
10 TABLET ORAL DAILY
Qty: 30 TABLET | Refills: 3 | Status: CANCELLED | DISCHARGE
Start: 2022-04-05

## 2022-04-05 RX ORDER — ARIPIPRAZOLE 5 MG/1
5 TABLET ORAL NIGHTLY
Qty: 30 TABLET | Refills: 3 | Status: CANCELLED | DISCHARGE
Start: 2022-04-05

## 2022-04-05 RX ORDER — LANOLIN ALCOHOL/MO/W.PET/CERES
6 CREAM (GRAM) TOPICAL NIGHTLY
Status: DISCONTINUED | OUTPATIENT
Start: 2022-04-05 | End: 2022-04-25 | Stop reason: HOSPADM

## 2022-04-05 RX ORDER — ROPINIROLE 0.25 MG/1
0.25 TABLET, FILM COATED ORAL NIGHTLY
Qty: 90 TABLET | Refills: 3 | Status: CANCELLED | DISCHARGE
Start: 2022-04-05

## 2022-04-05 RX ORDER — CHOLESTYRAMINE 4 G/9G
1 POWDER, FOR SUSPENSION ORAL 2 TIMES DAILY
Qty: 90 PACKET | Refills: 0 | Status: CANCELLED | DISCHARGE
Start: 2022-04-05

## 2022-04-05 RX ORDER — DILTIAZEM HCL 90 MG
90 TABLET ORAL 4 TIMES DAILY
Qty: 120 TABLET | Refills: 3 | Status: CANCELLED | DISCHARGE
Start: 2022-04-05

## 2022-04-05 RX ORDER — ALBUTEROL SULFATE 90 UG/1
4 AEROSOL, METERED RESPIRATORY (INHALATION) PRN
Qty: 18 G | Refills: 0 | Status: CANCELLED | DISCHARGE
Start: 2022-04-05

## 2022-04-05 RX ORDER — SEVELAMER CARBONATE 800 MG/1
2 TABLET, FILM COATED ORAL
Qty: 90 TABLET | Refills: 3 | Status: CANCELLED | DISCHARGE
Start: 2022-04-05

## 2022-04-05 RX ADMIN — Medication 10 ML: at 13:08

## 2022-04-05 RX ADMIN — FAMOTIDINE 10 MG: 20 TABLET ORAL at 13:08

## 2022-04-05 RX ADMIN — SODIUM CHLORIDE, PRESERVATIVE FREE 100 UNITS: 5 INJECTION INTRAVENOUS at 13:08

## 2022-04-05 RX ADMIN — DICYCLOMINE HYDROCHLORIDE 10 MG: 10 CAPSULE ORAL at 21:39

## 2022-04-05 RX ADMIN — Medication 1000 UNITS: at 13:07

## 2022-04-05 RX ADMIN — DILTIAZEM HYDROCHLORIDE 90 MG: 30 TABLET, FILM COATED ORAL at 21:36

## 2022-04-05 RX ADMIN — ESCITALOPRAM 10 MG: 10 TABLET, FILM COATED ORAL at 13:07

## 2022-04-05 RX ADMIN — Medication 10 ML: at 22:21

## 2022-04-05 RX ADMIN — MIRTAZAPINE 15 MG: 15 TABLET, FILM COATED ORAL at 21:37

## 2022-04-05 RX ADMIN — DILTIAZEM HYDROCHLORIDE 90 MG: 30 TABLET, FILM COATED ORAL at 13:07

## 2022-04-05 RX ADMIN — Medication 6 MG: at 22:15

## 2022-04-05 RX ADMIN — DICYCLOMINE HYDROCHLORIDE 10 MG: 10 CAPSULE ORAL at 13:07

## 2022-04-05 RX ADMIN — ROPINIROLE 0.25 MG: 0.25 TABLET, FILM COATED ORAL at 21:37

## 2022-04-05 RX ADMIN — LEVOTHYROXINE SODIUM 88 MCG: 0.09 TABLET ORAL at 05:34

## 2022-04-05 RX ADMIN — ARIPIPRAZOLE 5 MG: 5 TABLET ORAL at 21:37

## 2022-04-05 RX ADMIN — SODIUM CHLORIDE, PRESERVATIVE FREE 100 UNITS: 5 INJECTION INTRAVENOUS at 21:38

## 2022-04-05 RX ADMIN — DILTIAZEM HYDROCHLORIDE 90 MG: 30 TABLET, FILM COATED ORAL at 05:34

## 2022-04-05 RX ADMIN — ONDANSETRON 4 MG: 2 INJECTION INTRAMUSCULAR; INTRAVENOUS at 18:26

## 2022-04-05 ASSESSMENT — PAIN DESCRIPTION - LOCATION: LOCATION: ABDOMEN

## 2022-04-05 ASSESSMENT — PAIN SCALES - GENERAL
PAINLEVEL_OUTOF10: 5
PAINLEVEL_OUTOF10: 5

## 2022-04-05 ASSESSMENT — PAIN DESCRIPTION - PAIN TYPE: TYPE: ACUTE PAIN

## 2022-04-05 ASSESSMENT — PAIN SCALES - WONG BAKER: WONGBAKER_NUMERICALRESPONSE: 0

## 2022-04-05 NOTE — PROGRESS NOTES
[facility]  Internal Medicine Residency Program  Progress Note - House Team     Patient:  Liza Bowman 36 y.o. female MRN: [id number]     Date of Service: 4/5/2022     CC: Altered Mental Status 2/2 Hypoglycemia     Subjective   Brief Summary:    Ms. Nora Mcqueen is a 36year old female who was found obtunded and severely hypoglycemic at her nursing home on 3/27. She has a past medical history of ESRD on hemodialysis, poorly controlled TIDM, HTN, hypothyroidism, and depression. She was intubated for airway protection and admitted to the [place] [address] ICU admission and treatment with antibiotics and steroids, patient's status improved significantly. She is currently being managed for brittle type 1 diabetes and adrenal insufficiency workup. Hospital Day: 10    Overnight Events:   Patient refusing to eat, had an episode of incontinence. This AM     Patient was seen and examined this afternoon at bedside   Patient reported less abdominal pain than before. She still has generalized abdominal pain which is preventing her from eating. She also notes some mild nausea associated with eating. Otherwise, the patient is feeling much better and less fatigued than yesterday (hypoglycemic). Patient still endorses diarrhea (chronic issue). She denies fevers, chills and vomiting. Objective     Physical Exam:  · Vitals: BP (!) 169/95   Pulse 95   Temp 97.7 °F (36.5 °C)   Resp 18   Ht 5' 4\" (1.626 m)   Wt 139 lb 5.3 oz (63.2 kg)   SpO2 97%   BMI 23.92 kg/m²     · I & O - 24hr: I/O this shift:  · In: -   · Out: 3300    · General Appearance: alert, appears stated age, cooperative and no distress  · HEENT:  Scleral Icterus present. · Lung: clear to auscultation bilaterally  · Heart: regular rate and rhythm, S1, S2 normal, no murmur, click, rub or gallop  · Abdomen: soft. Mild tenderness to palpation. Improved response from yesterday (no grimacing).    · Extremities:  extremities normal, atraumatic, no cyanosis or edema  · Musculokeletal: No joint swelling, no muscle tenderness. ROM normal in all joints of extremities. · Neurologic: Mental status: Alert, oriented, thought content appropriate  Subject  Pertinent Labs & Imaging Studies   jorge alberto  CBC:   Lab Results   Component Value Date    WBC 5.6 04/05/2022    RBC 2.96 04/05/2022    HGB 7.7 04/05/2022    HGB 7.7 04/05/2022    HCT 24.2 04/05/2022    HCT 24.2 04/05/2022    MCV 81.8 04/05/2022    MCH 26.0 04/05/2022    MCHC 31.8 04/05/2022    RDW 18.4 04/05/2022     04/05/2022    MPV 10.1 04/05/2022     Hemoglobin/Hematocrit:    Lab Results   Component Value Date    HGB 7.7 04/05/2022    HGB 7.7 04/05/2022    HCT 24.2 04/05/2022    HCT 24.2 04/05/2022     Results for Neil Dhaliwal (MRN [id number]) as of 4/5/2022 12:41   Ref. Range 4/4/2022 10:55 4/4/2022 11:25 4/4/2022 11:25   CORTISOL Latest Ref Range: 2.68 - 18.40 mcg/dL 8.68 17.00 13.89       XR CHEST PORTABLE   Final Result   1. The lungs are clear. There is no infiltrate or effusion. 2. Stable position of the support lines and tubes. XR CHEST PORTABLE   Final Result   1. There is no acute cardiopulmonary disease   2. Stable position of the support lines and tubes. XR CHEST PORTABLE   Final Result   Infiltrate and or atelectasis at the left lower lobe. Substantially resolved   infiltrate and or atelectasis on the right. New NG tube. CT HEAD WO CONTRAST   Final Result   No acute intracranial abnormality or hemorrhage. CT ABDOMEN PELVIS WO CONTRAST Additional Contrast? None   Final Result   1. Moderate ascites throughout abdomen and pelvis. 2.  Multiple thick walled loops of small bowel throughout the abdomen could   suggest nonspecific infectious or inflammatory enteritis. 3.  Generalized body wall edema. 4.  Small bilateral pleural effusions with adjacent atelectatic changes.          XR ABDOMEN FOR NG/OG/NE TUBE PLACEMENT   Final Result   Enteric tube tip in the body of the stomach. XR CHEST PORTABLE   Final Result   Right perihilar opacity. XR CHEST PORTABLE    (Results Pending)        Resident's Assessment and Plan     Assessment and Plan:    1. Acute metabolic encephalopathy 2/2 hypoglycemia. R/O Adrenal Insufficiency  - Resolved. - Low cortisol and ACTH - evidence of adrenal insuffiencey. - cosyntropin stimulation test complete. Increase in cortisol from 8.68 to 17.00.   - Endocrine following, appreciate recs. 2. TIDM  - Endo following.   - highly sensitive to insulin  - patient placed on a modified low dose sliding scale with a max dose of 1 unit of Lantus daily. 4. C. Difficile (Chronic)  - acutely resolved     5. Abdominal Pain  - diffuse abdominal pain and nausea while eating.  - abdomen soft and mildly tender to palpation, no rebound or guarding.  - continue prn bentyl and zofran     5. End Stage Renal Disease  - Continue dialysis Tues/Thurs/Saturday     6. Hypothyroidism  - continue levothyroxine 88mg daily.      7. Depression  - continue Abilify and Celexa     8.  Hypertension  - continue cardizem        PT/OT evaluation: not indicated at this time  DVT prophylaxis/ GI prophylaxis: Heparin/Pepcid  Disposition: continue current care     Emily Haney, MS4  Attending physician: Dr. Stevan Doherty

## 2022-04-05 NOTE — PROGRESS NOTES
Department of Internal Medicine  Nephrology Progress Note      Events reviewed. Patient seen on dialysis. SUBJECTIVE:  We are following Miss Sweta Vuong for ESKD on HD. Patient reports no complaints.     PHYSICAL EXAM:      Vitals:    VITALS:  /86   Pulse 98   Temp 97.8 °F (36.6 °C)   Resp 18   Ht 5' 4\" (1.626 m)   Wt 139 lb 5.3 oz (63.2 kg)   SpO2 97%   BMI 23.92 kg/m²   24HR INTAKE/OUTPUT:    No intake or output data in the 24 hours ending 04/05/22 0910    Access: RIJ tunneled dialysis catheter  Constitutional: Awake, alert, NAD  HEENT:  PERRL, normocephalic, atraumatic  Respiratory:  CTA bilateral  Cardiovascular/Edema:  ST, RRR, no murmur gallop or rub  Gastrointestinal:  abd distended, c/o persistent abdominal pain  Neurologic: A&OX3 follows commands, no focal deficit  Skin:  Warm, dry, ecchymotic areas to abdomen    Other:    Scheduled Meds:   insulin lispro  0-3 Units SubCUTAneous TID WC    insulin glargine  1 Units SubCUTAneous Nightly    sodium chloride flush  5-40 mL IntraVENous 2 times per day    heparin flush  1 mL IntraVENous 2 times per day    melatonin  5 mg Oral Nightly    dilTIAZem  90 mg Oral Q6H    mirtazapine  15 mg Oral Nightly    escitalopram  10 mg Oral Daily    ARIPiprazole  5 mg Oral Nightly    rOPINIRole  0.25 mg Oral Nightly    levothyroxine  88 mcg Oral Daily    famotidine  10 mg Oral Daily    epoetin nena-epbx  3,000 Units SubCUTAneous Once per day on Mon Wed Fri    Vitamin D  1,000 Units Per NG tube Daily     Continuous Infusions:   sodium chloride      sodium chloride      sodium chloride      dextrose       PRN Meds:.dicyclomine, sodium chloride, loperamide, sodium chloride, sodium chloride flush, sodium chloride, heparin flush, ondansetron, white petrolatum, polyethylene glycol, acetaminophen **OR** acetaminophen, glucose, dextrose, glucagon (rDNA), dextrose, albuterol, labetalol    DATA:    CBC:   Lab Results   Component Value Date    WBC 5.6 04/05/2022    RBC 2.96 04/05/2022    HGB 7.7 04/05/2022    HGB 7.7 04/05/2022    HCT 24.2 04/05/2022    HCT 24.2 04/05/2022    MCV 81.8 04/05/2022    MCH 26.0 04/05/2022    MCHC 31.8 04/05/2022    RDW 18.4 04/05/2022     04/05/2022    MPV 10.1 04/05/2022     CMP:    Lab Results   Component Value Date     04/05/2022    K 5.0 04/05/2022    K 3.7 03/04/2022     04/05/2022    CO2 25 04/05/2022    BUN 26 04/05/2022    CREATININE 3.7 04/05/2022    GFRAA 17 04/05/2022    LABGLOM 17 04/05/2022    GLUCOSE 243 04/05/2022    GLUCOSE 130 05/18/2012    PROT 6.1 04/05/2022    LABALBU 3.1 04/05/2022    LABALBU 4.1 05/18/2012    CALCIUM 8.3 04/05/2022    BILITOT 0.3 04/05/2022    ALKPHOS 152 04/05/2022    AST 25 04/05/2022    ALT 19 04/05/2022     Magnesium:    Lab Results   Component Value Date    MG 2.1 04/05/2022     Phosphorus:    Lab Results   Component Value Date    PHOS 3.4 04/05/2022     Radiology Review:      CT Head WO Contrast March 27, 2022   No acute intracranial abnormality or hemorrhage.         CT Abdomen Pelvis WO Contrast March 27, 2022   1.  Moderate ascites throughout abdomen and pelvis.       2.  Multiple thick walled loops of small bowel throughout the abdomen could   suggest nonspecific infectious or inflammatory enteritis.       3.  Generalized body wall edema.       4.  Small bilateral pleural effusions with adjacent atelectatic changes.             Chest X-Ray March 28, 2022   Infiltrate and or atelectasis at the left lower lobe.  Substantially resolved   infiltrate and or atelectasis on the right.  New NG tube.               BRIEF SUMMARY OF INITIAL CONSULT:    Briefly, Miss Queta Mackey is a 36year-old female with a PMH of ESRD on hemodialysis 3 days/wk, on TTS schedule at [facility] I DM, poorly controlled with recurrent admissions for DKA, HTN, gastroparesis, ascites, infective endocarditis, cardiac arrest, hypothyroidism, recent Covid, and depression who was admitted on March 27, 2022 after she was found unresponsive at the SNF where she resides. Patient was found to be profoundly hypoglycemic and EMS was called. She was intubated in ER for airway protection as she remained unresponsive. CT head showed no acute intracranial abnormality or hemorrhage, chest x-ray demonstrates right perihilar opacity concerning for aspiration pneumonia. She was ultimately admitted to MICU for further evaluation. Labs on admission were significant for sodium 135, potassium 5.0, chloride 100, bicarbonate 25, BUN 38, creatinine 4.9, proBNP >70,000. We are consulted for dialysis management. Problems resolved. · Hypoglycemia, was on D10, now hyperglycemic with +ketones (beta-hydroxybutyrate >4.5)  · Acute respiratory failure, 2/2 aspiration pneumonia? On 40% Fio2. Extubated 5/11  · Acute metabolic encephalopathy 2/2 hypoglycemia. Resolving   · Acidemia, pH 7.311, PCO2 35.0, HCO3 47.2, metabolic acidosis from DKA    IMPRESSION/RECOMMENDATIONS:      1. ESKD 3 times a week TTS via RIJ tunneled dialysis catheter. HD initiated September 2021 after failed peritoneal dialysis with persistent hyperkalemia. For dialysis three times/wk TTS while inpatient. Having dialysis today. 2. HTN, on cardizem 90 mg po qid  3. MBD of CKD, currently with hypophosphatemia. Binders d/c'd  4. Anemia of CKD, continue epoetin alpha 3,000 unit 3 times/wk. s/p transfusion   5. Vitamin D deficiency, on ergocalciferol 1000 po daily  ------------------------------------------------------  6. Type I DM, poorly controlled with frequent readmissions for DKA, on SSI, lantus decreased to 1 unit nightly  7. Aspiration pneumonia? S/p piperacillin    8. Restless leg syndrome, on ropinirole at home  9. Depression, on escitalopram and Abilify  10. Hypothyroidism, on levothyroxine   11. History of gastroparesis, on metoclopramide at home.   For EGD and pyloric dilatation with Botox injection outpatient per GI.  12. Hx recurrent C. difficile

## 2022-04-05 NOTE — FLOWSHEET NOTE
04/05/22 1121   Vital Signs   BP (!) 169/95   Temp 97.7 °F (36.5 °C)   Pulse 95   Resp 18   Post-Hemodialysis Assessment   Post-Treatment Procedures Blood returned;Catheter capped, clamped and heparinized x 2 ports   Machine Disinfection Process Exterior Machine Disinfection   Rinseback Volume (ml) 300 ml   Total Liters Processed (l/min) 71.1 l/min   Dialyzer Clearance Clear   Duration of Treatment (minutes) 210 minutes   Hemodialysis Output (ml) 3300 ml   NET Removed (ml) 3000 ml   Patient Response to Treatment dialysis tx ended.  cath closed per policy and procedure

## 2022-04-05 NOTE — CARE COORDINATION
Per Derrell Jarvis from [facility], precert has been obtained is is good through tomorrow. Nephrology, Endocrinology ans ID are all following. Pt goes to HD at [facility] in [place] on T/T/S at 7 AM. Per Derrell Jarvis, no negative Covid-19 test needed. No Hens needed since patient is from this facility. Derrell Jarvis can set up transportation for this patient at time of discharge.   Ashley Ha RN CM  [phone]

## 2022-04-05 NOTE — PROGRESS NOTES
[facility]  Internal Medicine Clinic    Attending Physician Statement:  Antonietta Goodpasture. D.O. I have discussed the case, including pertinent history and exam findings with the resident. I agree with the assessment, plan and orders as documented by the resident. Patient here for routine follow up of medical problems. IDDM2: multiple episodes of hypoglycemia as outpatient; endocrinology evaluating patient at this time; continue treatment per their recs; patient became hypoglycemic overnight 2/2 not eating for last 24 hours; patient stating that she is having abdominal cramping which improves with fentanyl; discussed with patient that we would not prescribe opiates for her abdominal pain; bentyl improved his abdominal pain; patient will be eating this afternoon; if able to tolerate diet and blood glucose stable; then can discharge     End-stage renal disease on hemodialysis: Nephrology consulted and for dialysis on a TTS schedule, care per their recommendations,     Able to Discharge tomorrow AM if able to tolerate diet     Remainder of medical problems as per resident note.

## 2022-04-05 NOTE — PROGRESS NOTES
ENDOCRINOLOGY PROGRESS NOTE  Via Tele-Health Service       Date of admission: 3/27/2022  Date of service: 4/5/2022  Admitting physician: Arlene Lopez DO   Primary Care Physician: Anna Coffman MD  Consultant physician: Sole Sal MD     Reason for the consultation:  DM type 1, labile diabetes     History of Present Illness: The history is provided from medical records, pt sedated intubated     Bebeto Reece is a 36 y.o. old female nursing home resident with PMH of Type I DM, ESRD on PD,HTN,hypothyroidism, infective endocarditis and other listed below admitted to [facility] on 3/27/2022 because of AMS. Endocrine service was consulted for DM management.      subjective   No acute events, BG improving     Point of care glucose monitoring (Independently reviewed)   Recent Labs     04/04/22  0757 04/04/22  0905 04/04/22  1130 04/04/22  1745 04/04/22  2010 04/05/22  0424 04/05/22  1302 04/05/22  1722   GLUMET 160* 164* 183* 255* 248* 160* 152* 192*       Scheduled Meds:   insulin lispro  0-3 Units SubCUTAneous TID WC    insulin glargine  1 Units SubCUTAneous Nightly    sodium chloride flush  5-40 mL IntraVENous 2 times per day    heparin flush  1 mL IntraVENous 2 times per day    melatonin  5 mg Oral Nightly    dilTIAZem  90 mg Oral Q6H    mirtazapine  15 mg Oral Nightly    escitalopram  10 mg Oral Daily    ARIPiprazole  5 mg Oral Nightly    rOPINIRole  0.25 mg Oral Nightly    levothyroxine  88 mcg Oral Daily    famotidine  10 mg Oral Daily    epoetin nena-epbx  3,000 Units SubCUTAneous Once per day on Mon Wed Fri    Vitamin D  1,000 Units Per NG tube Daily     PRN Meds:   dicyclomine, 10 mg, TID PRN  sodium chloride, , PRN  loperamide, 2 mg, 4x Daily PRN  sodium chloride, , PRN  sodium chloride flush, 5-40 mL, PRN  sodium chloride, , PRN  heparin flush, 1 mL, PRN  ondansetron, 4 mg, Q6H PRN  white petrolatum, , BID PRN  polyethylene glycol, 17 g, Daily PRN  acetaminophen, 650 mg, Q6H PRN Or  acetaminophen, 650 mg, Q6H PRN  glucose, 15 g, PRN  dextrose, 12.5 g, PRN  glucagon (rDNA), 1 mg, PRN  dextrose, 100 mL/hr, PRN  albuterol, 2.5 mg, Q6H PRN  labetalol, 5 mg, Q6H PRN      Continuous Infusions:   sodium chloride      sodium chloride      sodium chloride      dextrose         Review of Systems  Non-obtainable     OBJECTIVE    BP (!) 148/88   Pulse 95   Temp 97.7 °F (36.5 °C)   Resp 18   Ht 5' 4\" (1.626 m)   Wt 139 lb 5.3 oz (63.2 kg)   SpO2 97%   BMI 23.92 kg/m²     Intake/Output Summary (Last 24 hours) at 4/5/2022 1853  Last data filed at 4/5/2022 1121  Gross per 24 hour   Intake --   Output 3300 ml   Net -3300 ml       Physical examination:  Due to this being a TeleHealth encounter, evaluation of the following organ systems is limited: Vitals/Constitutional/EENT/Resp/CV/GI//MS/Neuro/Skin/Heme-Lymph-Imm. Modified physical exam through Telemedicine camera    General: lethrgic  Neck: no obvious neck mass. No obvious neck deformity     CVS: no distress   Chest: no distress. Chest is moving with respiration    Extremities:  no visible tremor  Skin: No visible rashes as seen from camera   Musculoskeletal: no visible deformity      Review of Laboratory Data:  I personally reviewed the following labs:   Recent Labs     04/03/22  0548 04/03/22  1033 04/04/22  0540 04/04/22  0540 04/04/22  1125 04/04/22  2150 04/05/22  0330   WBC 5.1  --  5.2  --   --   --  5.6   RBC 2.64*  --  2.95*  --   --   --  2.96*   HGB 6.6*   < > 7.7*   < > 8.0* 7.8* 7.7*  7.7*   HCT 21.5*   < > 24.5*   < > 25.4* 24.3* 24.2*  24.2*   MCV 81.4  --  83.1  --   --   --  81.8   MCH 25.0*  --  26.1  --   --   --  26.0   MCHC 30.7*  --  31.4*  --   --   --  31.8*   RDW 18.3*  --  18.1*  --   --   --  18.4*     --  208  --   --   --  211   MPV 10.1  --  11.0  --   --   --  10.1    < > = values in this interval not displayed.      Recent Labs     04/03/22  0548 04/03/22  1813 04/04/22  0634 04/04/22  1755 04/05/22  0330      < > 141 137 138   K 4.5   < > 4.4 4.9 5.0      < > 104 104 102   CO2 27   < > 27 26 25   BUN 15   < > 22* 22* 26*   CREATININE 2.1*   < > 2.8* 3.2* 3.7*   GLUCOSE 279*   < > 25* 256* 243*   CALCIUM 7.8*   < > 8.2* 8.1* 8.3*   PROT 5.9*  --  6.3*  --  6.1*   LABALBU 2.3*  --  3.3*  --  3.1*   BILITOT <0.2  --  0.4  --  0.3   ALKPHOS 173*  --  138*  --  152*   AST 33*  --  33*  --  25   ALT 24  --  21  --  19    < > = values in this interval not displayed. Beta-Hydroxybutyrate   Date Value Ref Range Status   03/29/2022 >4.50 (H) 0.02 - 0.27 mmol/L Final   03/27/2022 0.10 0.02 - 0.27 mmol/L Final   03/02/2022 2.05 (H) 0.02 - 0.27 mmol/L Final     Lab Results   Component Value Date    LABA1C 7.7 03/02/2022    LABA1C 8.7 12/08/2021    LABA1C 10.2 11/23/2021     Lab Results   Component Value Date/Time    TSH 5.370 (H) 03/28/2022 01:56 AM    T4FREE 1.10 03/28/2022 01:56 AM    E8ZWYPN 8.6 11/05/2015 02:20 AM    FT3 1.3 (L) 10/31/2020 10:43 AM    G5VDAYV 36.73 (L) 07/20/2020 02:00 PM     Lab Results   Component Value Date    LABA1C 7.7 03/02/2022    GLUCOSE 243 04/05/2022    GLUCOSE 130 05/18/2012    MALBCR 2949.3 01/15/2020    LABMICR 1740.1 01/15/2020    LABCREA 59 01/15/2020    LABCREA 61 01/15/2020     Lab Results   Component Value Date    TRIG 82 01/14/2020    HDL 33 01/14/2020    LDLCALC 46 01/14/2020    CHOL 95 01/14/2020       Blood culture   Lab Results   Component Value Date    BC 5 Days no growth 03/28/2022    BC 5 Days no growth 03/27/2022       Radiology:  XR CHEST PORTABLE   Final Result   1. The lungs are clear. There is no infiltrate or effusion. 2. Stable position of the support lines and tubes. XR CHEST PORTABLE   Final Result   1. There is no acute cardiopulmonary disease   2. Stable position of the support lines and tubes. XR CHEST PORTABLE   Final Result   Infiltrate and or atelectasis at the left lower lobe.   Substantially resolved   infiltrate and or atelectasis on the right. New NG tube. CT HEAD WO CONTRAST   Final Result   No acute intracranial abnormality or hemorrhage. CT ABDOMEN PELVIS WO CONTRAST Additional Contrast? None   Final Result   1. Moderate ascites throughout abdomen and pelvis. 2.  Multiple thick walled loops of small bowel throughout the abdomen could   suggest nonspecific infectious or inflammatory enteritis. 3.  Generalized body wall edema. 4.  Small bilateral pleural effusions with adjacent atelectatic changes. XR ABDOMEN FOR NG/OG/NE TUBE PLACEMENT   Final Result   Enteric tube tip in the body of the stomach. XR CHEST PORTABLE   Final Result   Right perihilar opacity. XR CHEST PORTABLE    (Results Pending)       Medical Records/Labs/Images review:   I personally reviewed and summarized previous records   All labs and imaging were reviewed independently     [address] Nikolay Maldonado, a 36 y.o.-old female seen today for inpatient diabetes management     Diabetes Mellitus type I    · Extremely insulin sensitive. Pt is type 1 diabetic and needs long acting insulin   · we recommend the following sq insulin regimen   · Lantus ONE units daily at night   · Modified Low dose sliding scale (1u:100>200) with meals  · No bedtime sliding scale   · Continue following BG and adjust insulin regimen    Evaluation for adrenal insufficiency   · Cosyntropin stim test was negative and this result essentially r/o AI     primary Hypothyroidism  · Levothyroxine was adjusted during this admission to 88 mcg daily      ESRD  · Pt at risk for hypoglycemia  · On dialysis, nephrology following    Thank you for allowing us to participate in the care of this patient. Please do not hesitate to contact us with any additional questions.      Rafael Prakash MD  Endocrinologist, [facility] Diabetes Care and Endocrinology   [address] [facility] [address]   Phone: [phone]  Fax: [phone]  ----------------------------  An electronic signature was used to authenticate this note.  Edin Guthrie MD on 4/5/2022 at 6:53 PM

## 2022-04-05 NOTE — PROGRESS NOTES
[facility]  Internal Medicine Residency Program  Progress Note - House Team 2    Patient:  Paul Cruz 36 y.o. female MRN: [id number]     Date of Service: 4/5/2022     CC: AMS   Overnight events: NAEO     Subjective      On day 9 of hospital admission    Overnight no significant events   Patient for dialysis this AM   Patient seen at bedside and complains of continued abdominal pain. Patient also has not been eating and states that eating makes her nauseous. Patient has denied any episodes of vomiting at this time. Objective     Physical Exam:  · Vitals: BP (!) 169/95   Pulse 95   Temp 97.7 °F (36.5 °C)   Resp 18   Ht 5' 4\" (1.626 m)   Wt 139 lb 5.3 oz (63.2 kg)   SpO2 97%   BMI 23.92 kg/m²       · Constitutional: Awake, alert, able to answer questions. Follows commands. In no apparent distress. · Head: Normocephalic and atraumatic. · Eyes: EOMI, conjunctiva normal, (-) scleral icterus. Mucus membranes moist.  · Mouth: Mucus membranes moist. Oropharynx clear. No deviation of the tongue or uvula. Normal dentition. · Neck: (-)  swelling, (-) JVD, trachea midline  · Respiratory: Lungs clear to auscultation bilaterally. (-)  wheezes, (-)  rales, (-)  rhonchi. No increased work of breathing or respiratory distress  · Cardiovascular: RRR. (-)  murmurs, (-) gallops,  (-) rubs. S1 and S2 were normal.   · GI:  Abdomen soft, non-tender, non-distended. (+) BS. (-) guarding, (-) rigidity. · Extremities: Warm and well perfused. (-) clubbing, (-) cyanosis. 2+ distal pulses. (-) peripheral edema. (-)Sacral pitting edema. · Neurologic:  No focal neurological deficits.   No tremor or overt seizure activity    Pertinent Labs & Imaging Studies   jorge alberto  CBC with Differential:    Lab Results   Component Value Date    WBC 5.6 04/05/2022    RBC 2.96 04/05/2022    HGB 7.7 04/05/2022    HGB 7.7 04/05/2022    HCT 24.2 04/05/2022    HCT 24.2 04/05/2022     04/05/2022    MCV 81.8 04/05/2022    MCH 26.0 04/05/2022    MCHC 31.8 04/05/2022    RDW 18.4 04/05/2022    NRBC 0.9 03/28/2022    SEGSPCT 67 06/27/2013    METASPCT 1.7 08/10/2020    LYMPHOPCT 17.4 04/05/2022    MONOPCT 6.4 04/05/2022    MYELOPCT 0.9 01/19/2022    BASOPCT 0.2 04/05/2022    MONOSABS 0.36 04/05/2022    LYMPHSABS 0.98 04/05/2022    EOSABS 0.20 04/05/2022    BASOSABS 0.01 04/05/2022     CMP:    Lab Results   Component Value Date     04/05/2022    K 5.0 04/05/2022    K 3.7 03/04/2022     04/05/2022    CO2 25 04/05/2022    BUN 26 04/05/2022    CREATININE 3.7 04/05/2022    GFRAA 17 04/05/2022    LABGLOM 17 04/05/2022    GLUCOSE 243 04/05/2022    GLUCOSE 130 05/18/2012    PROT 6.1 04/05/2022    LABALBU 3.1 04/05/2022    LABALBU 4.1 05/18/2012    CALCIUM 8.3 04/05/2022    BILITOT 0.3 04/05/2022    ALKPHOS 152 04/05/2022    AST 25 04/05/2022    ALT 19 04/05/2022     Magnesium:    Lab Results   Component Value Date    MG 2.1 04/05/2022     Phosphorus:    Lab Results   Component Value Date    PHOS 3.4 04/05/2022     Troponin:    Lab Results   Component Value Date    TROPONINI 0.12 05/14/2021     Resident's Assessment and Plan     Assessment     1. Acute metabolic encephalopathy-likely due to hypoglycemia in setting of DM1 and ESRD (CTH negative, negative UDS/SDS, NH3 normal, B12 normal 1 month ago)  2. HTN  3. ESRD on HD TTS  4. IDDM1-A1c 7.7% this admission  5. Hypothyroidism-TSH 5.37, FT4 1.10 normal  6. Acute on chronic anemia-s/p 1 unit PRBCs, chronic component likely due to ESRD  7. History of MDRO UTI  8. History of C. difficile infection    Resolved/Stable Problems:   · Acute hypoxic respiratory failure   · Aspiration pneumonia     Plan   Pepcid and Zofran to aid w/ abdominal pain/dyspepsia - continue endorsing PO diet   Per Endocrine, continue on 2U Lantus nightly w/ modified SSI. Deloria Hallmark w/ Endocrine - Dr. Godwin Joseph. Plan to continue f/u as outpatient when pt discharged.  Pt will likely need cosyntropin stimulation test as outpatient   o Per Endo, upon discharge, patient to be on Lantus 1u nighty with current sliding scale with meals.  Patient is NOT to be on bedtime sliding scale   Continue Synthroid 88 mcg daily   HD TThS per nephro   Continue Abilify and Celexa   Glucose checks ACHS   Appreciate Endo, nephro, infectious disease recommendations    PT/OT evaluation: Not indicated at this time  DVT prophylaxis/ GI prophylaxis: Heparin / Pepcid  Disposition: Continue current care    Long Rubio MD, PGY-1  Attending physician: Dr. Jam Maher

## 2022-04-05 NOTE — PLAN OF CARE
Problem: Skin Integrity:  Goal: Will show no infection signs and symptoms  Description: Will show no infection signs and symptoms  4/4/2022 1146 by Shannan Solorzano RN  Outcome: Met This Shift  Goal: Absence of new skin breakdown  Description: Absence of new skin breakdown  4/4/2022 1146 by Shannan Solorzano RN  Outcome: Met This Shift

## 2022-04-06 LAB
ALBUMIN SERPL-MCNC: 2.8 G/DL (ref 3.5–5.2)
ALP BLD-CCNC: 167 U/L (ref 35–104)
ALT SERPL-CCNC: 17 U/L (ref 0–32)
ANION GAP SERPL CALCULATED.3IONS-SCNC: 17 MMOL/L (ref 7–16)
ANION GAP SERPL CALCULATED.3IONS-SCNC: 17 MMOL/L (ref 7–16)
ANION GAP SERPL CALCULATED.3IONS-SCNC: 19 MMOL/L (ref 7–16)
ANION GAP SERPL CALCULATED.3IONS-SCNC: 24 MMOL/L (ref 7–16)
AST SERPL-CCNC: 20 U/L (ref 0–31)
BASOPHILS ABSOLUTE: 0.03 E9/L (ref 0–0.2)
BASOPHILS RELATIVE PERCENT: 0.7 % (ref 0–2)
BETA-HYDROXYBUTYRATE: >4.5 MMOL/L (ref 0.02–0.27)
BILIRUB SERPL-MCNC: 0.3 MG/DL (ref 0–1.2)
BILIRUBIN DIRECT: <0.2 MG/DL (ref 0–0.3)
BILIRUBIN, INDIRECT: ABNORMAL MG/DL (ref 0–1)
BUN BLDV-MCNC: 13 MG/DL (ref 6–20)
BUN BLDV-MCNC: 17 MG/DL (ref 6–20)
CALCIUM IONIZED: 1.26 MMOL/L (ref 1.15–1.33)
CALCIUM SERPL-MCNC: 8.2 MG/DL (ref 8.6–10.2)
CALCIUM SERPL-MCNC: 8.3 MG/DL (ref 8.6–10.2)
CALCIUM SERPL-MCNC: 8.5 MG/DL (ref 8.6–10.2)
CALCIUM SERPL-MCNC: 8.6 MG/DL (ref 8.6–10.2)
CHLORIDE BLD-SCNC: 101 MMOL/L (ref 98–107)
CHLORIDE BLD-SCNC: 101 MMOL/L (ref 98–107)
CHLORIDE BLD-SCNC: 102 MMOL/L (ref 98–107)
CHLORIDE BLD-SCNC: 97 MMOL/L (ref 98–107)
CO2: 16 MMOL/L (ref 22–29)
CO2: 20 MMOL/L (ref 22–29)
CO2: 20 MMOL/L (ref 22–29)
CO2: 22 MMOL/L (ref 22–29)
CREAT SERPL-MCNC: 2.4 MG/DL (ref 0.5–1)
CREAT SERPL-MCNC: 2.8 MG/DL (ref 0.5–1)
CREAT SERPL-MCNC: 3 MG/DL (ref 0.5–1)
CREAT SERPL-MCNC: 3.1 MG/DL (ref 0.5–1)
EOSINOPHILS ABSOLUTE: 0.22 E9/L (ref 0.05–0.5)
EOSINOPHILS RELATIVE PERCENT: 4.8 % (ref 0–6)
GFR AFRICAN AMERICAN: 21
GFR AFRICAN AMERICAN: 22
GFR AFRICAN AMERICAN: 24
GFR AFRICAN AMERICAN: 29
GFR NON-AFRICAN AMERICAN: 21 ML/MIN/1.73
GFR NON-AFRICAN AMERICAN: 22 ML/MIN/1.73
GFR NON-AFRICAN AMERICAN: 24 ML/MIN/1.73
GFR NON-AFRICAN AMERICAN: 29 ML/MIN/1.73
GLUCOSE BLD-MCNC: 175 MG/DL (ref 74–99)
GLUCOSE BLD-MCNC: 179 MG/DL (ref 74–99)
GLUCOSE BLD-MCNC: 234 MG/DL (ref 74–99)
GLUCOSE BLD-MCNC: 276 MG/DL (ref 74–99)
HCT VFR BLD CALC: 25.8 % (ref 34–48)
HEMOGLOBIN: 7.7 G/DL (ref 11.5–15.5)
IMMATURE GRANULOCYTES #: 0.02 E9/L
IMMATURE GRANULOCYTES %: 0.4 % (ref 0–5)
LYMPHOCYTES ABSOLUTE: 1.11 E9/L (ref 1.5–4)
LYMPHOCYTES RELATIVE PERCENT: 24.2 % (ref 20–42)
Lab: NORMAL
MAGNESIUM: 2.1 MG/DL (ref 1.6–2.6)
MCH RBC QN AUTO: 25.7 PG (ref 26–35)
MCHC RBC AUTO-ENTMCNC: 29.8 % (ref 32–34.5)
MCV RBC AUTO: 86 FL (ref 80–99.9)
METER GLUCOSE: 185 MG/DL (ref 74–99)
METER GLUCOSE: 187 MG/DL (ref 74–99)
METER GLUCOSE: 242 MG/DL (ref 74–99)
METER GLUCOSE: 264 MG/DL (ref 74–99)
MONOCYTES ABSOLUTE: 0.43 E9/L (ref 0.1–0.95)
MONOCYTES RELATIVE PERCENT: 9.4 % (ref 2–12)
NEUTROPHILS ABSOLUTE: 2.77 E9/L (ref 1.8–7.3)
NEUTROPHILS RELATIVE PERCENT: 60.5 % (ref 43–80)
PDW BLD-RTO: 18.6 FL (ref 11.5–15)
PHOSPHORUS: 3.1 MG/DL (ref 2.5–4.5)
PLATELET # BLD: 214 E9/L (ref 130–450)
PMV BLD AUTO: 10 FL (ref 7–12)
POTASSIUM SERPL-SCNC: 4.2 MMOL/L (ref 3.5–5)
POTASSIUM SERPL-SCNC: 4.3 MMOL/L (ref 3.5–5)
POTASSIUM SERPL-SCNC: 4.3 MMOL/L (ref 3.5–5)
POTASSIUM SERPL-SCNC: 4.4 MMOL/L (ref 3.5–5)
RBC # BLD: 3 E12/L (ref 3.5–5.5)
REPORT: NORMAL
SODIUM BLD-SCNC: 137 MMOL/L (ref 132–146)
SODIUM BLD-SCNC: 138 MMOL/L (ref 132–146)
SODIUM BLD-SCNC: 140 MMOL/L (ref 132–146)
SODIUM BLD-SCNC: 141 MMOL/L (ref 132–146)
THIS TEST SENT TO: NORMAL
TOTAL PROTEIN: 5.9 G/DL (ref 6.4–8.3)
WBC # BLD: 4.6 E9/L (ref 4.5–11.5)

## 2022-04-06 PROCEDURE — 2580000003 HC RX 258: Performed by: INTERNAL MEDICINE

## 2022-04-06 PROCEDURE — 83735 ASSAY OF MAGNESIUM: CPT

## 2022-04-06 PROCEDURE — 6370000000 HC RX 637 (ALT 250 FOR IP): Performed by: INTERNAL MEDICINE

## 2022-04-06 PROCEDURE — 36415 COLL VENOUS BLD VENIPUNCTURE: CPT

## 2022-04-06 PROCEDURE — 99232 SBSQ HOSP IP/OBS MODERATE 35: CPT | Performed by: INTERNAL MEDICINE

## 2022-04-06 PROCEDURE — 85025 COMPLETE CBC W/AUTO DIFF WBC: CPT

## 2022-04-06 PROCEDURE — 82330 ASSAY OF CALCIUM: CPT

## 2022-04-06 PROCEDURE — 93005 ELECTROCARDIOGRAM TRACING: CPT | Performed by: INTERNAL MEDICINE

## 2022-04-06 PROCEDURE — 82962 GLUCOSE BLOOD TEST: CPT

## 2022-04-06 PROCEDURE — 6370000000 HC RX 637 (ALT 250 FOR IP): Performed by: STUDENT IN AN ORGANIZED HEALTH CARE EDUCATION/TRAINING PROGRAM

## 2022-04-06 PROCEDURE — 6360000002 HC RX W HCPCS: Performed by: INTERNAL MEDICINE

## 2022-04-06 PROCEDURE — P9047 ALBUMIN (HUMAN), 25%, 50ML: HCPCS | Performed by: INTERNAL MEDICINE

## 2022-04-06 PROCEDURE — 97535 SELF CARE MNGMENT TRAINING: CPT

## 2022-04-06 PROCEDURE — 82010 KETONE BODYS QUAN: CPT

## 2022-04-06 PROCEDURE — 84100 ASSAY OF PHOSPHORUS: CPT

## 2022-04-06 PROCEDURE — 6360000002 HC RX W HCPCS: Performed by: NURSE PRACTITIONER

## 2022-04-06 PROCEDURE — 1200000000 HC SEMI PRIVATE

## 2022-04-06 PROCEDURE — 80076 HEPATIC FUNCTION PANEL: CPT

## 2022-04-06 PROCEDURE — 80048 BASIC METABOLIC PNL TOTAL CA: CPT

## 2022-04-06 PROCEDURE — 99233 SBSQ HOSP IP/OBS HIGH 50: CPT | Performed by: INTERNAL MEDICINE

## 2022-04-06 PROCEDURE — S5553 INSULIN LONG ACTING 5 U: HCPCS | Performed by: INTERNAL MEDICINE

## 2022-04-06 PROCEDURE — 97530 THERAPEUTIC ACTIVITIES: CPT

## 2022-04-06 RX ORDER — SODIUM CHLORIDE 9 MG/ML
INJECTION, SOLUTION INTRAVENOUS CONTINUOUS
Status: DISCONTINUED | OUTPATIENT
Start: 2022-04-06 | End: 2022-04-07

## 2022-04-06 RX ORDER — METOCLOPRAMIDE 5 MG/1
5 TABLET ORAL
Status: DISCONTINUED | OUTPATIENT
Start: 2022-04-06 | End: 2022-04-07

## 2022-04-06 RX ORDER — INSULIN GLARGINE-YFGN 100 [IU]/ML
1 INJECTION, SOLUTION SUBCUTANEOUS EVERY MORNING
Status: DISCONTINUED | OUTPATIENT
Start: 2022-04-07 | End: 2022-04-06

## 2022-04-06 RX ORDER — ALBUMIN (HUMAN) 12.5 G/50ML
25 SOLUTION INTRAVENOUS EVERY 8 HOURS
Status: COMPLETED | OUTPATIENT
Start: 2022-04-06 | End: 2022-04-08

## 2022-04-06 RX ORDER — INSULIN GLARGINE-YFGN 100 [IU]/ML
1 INJECTION, SOLUTION SUBCUTANEOUS EVERY MORNING
Status: DISCONTINUED | OUTPATIENT
Start: 2022-04-06 | End: 2022-04-09

## 2022-04-06 RX ORDER — 0.9 % SODIUM CHLORIDE 0.9 %
500 INTRAVENOUS SOLUTION INTRAVENOUS ONCE
Status: COMPLETED | OUTPATIENT
Start: 2022-04-06 | End: 2022-04-06

## 2022-04-06 RX ORDER — ONDANSETRON 2 MG/ML
4 INJECTION INTRAMUSCULAR; INTRAVENOUS EVERY 4 HOURS PRN
Status: DISCONTINUED | OUTPATIENT
Start: 2022-04-06 | End: 2022-04-20

## 2022-04-06 RX ORDER — DICYCLOMINE HYDROCHLORIDE 10 MG/1
10 CAPSULE ORAL
Status: DISCONTINUED | OUTPATIENT
Start: 2022-04-06 | End: 2022-04-13

## 2022-04-06 RX ORDER — POTASSIUM CHLORIDE 7.45 MG/ML
10 INJECTION INTRAVENOUS
Status: DISCONTINUED | OUTPATIENT
Start: 2022-04-06 | End: 2022-04-06

## 2022-04-06 RX ADMIN — SODIUM CHLORIDE, PRESERVATIVE FREE 100 UNITS: 5 INJECTION INTRAVENOUS at 21:02

## 2022-04-06 RX ADMIN — MAGNESIUM HYDROXIDE/ALUMINUM HYDROXICE/SIMETHICONE: 120; 1200; 1200 SUSPENSION ORAL at 12:15

## 2022-04-06 RX ADMIN — ONDANSETRON 4 MG: 2 INJECTION INTRAMUSCULAR; INTRAVENOUS at 10:48

## 2022-04-06 RX ADMIN — Medication 10 ML: at 09:54

## 2022-04-06 RX ADMIN — DICYCLOMINE HYDROCHLORIDE 10 MG: 10 CAPSULE ORAL at 17:02

## 2022-04-06 RX ADMIN — METOCLOPRAMIDE HYDROCHLORIDE 5 MG: 5 TABLET ORAL at 17:01

## 2022-04-06 RX ADMIN — INSULIN LISPRO 1 UNITS: 100 INJECTION, SOLUTION INTRAVENOUS; SUBCUTANEOUS at 12:16

## 2022-04-06 RX ADMIN — ONDANSETRON 4 MG: 2 INJECTION INTRAMUSCULAR; INTRAVENOUS at 17:00

## 2022-04-06 RX ADMIN — EPOETIN ALFA-EPBX 3000 UNITS: 3000 INJECTION, SOLUTION INTRAVENOUS; SUBCUTANEOUS at 09:53

## 2022-04-06 RX ADMIN — ACETAMINOPHEN 650 MG: 325 TABLET ORAL at 12:15

## 2022-04-06 RX ADMIN — DILTIAZEM HYDROCHLORIDE 90 MG: 30 TABLET, FILM COATED ORAL at 21:04

## 2022-04-06 RX ADMIN — DICYCLOMINE HYDROCHLORIDE 10 MG: 10 CAPSULE ORAL at 12:14

## 2022-04-06 RX ADMIN — LEVOTHYROXINE SODIUM 88 MCG: 0.09 TABLET ORAL at 06:26

## 2022-04-06 RX ADMIN — ARIPIPRAZOLE 5 MG: 5 TABLET ORAL at 21:02

## 2022-04-06 RX ADMIN — ONDANSETRON 4 MG: 2 INJECTION INTRAMUSCULAR; INTRAVENOUS at 05:06

## 2022-04-06 RX ADMIN — INSULIN GLARGINE-YFGN 1 UNITS: 100 INJECTION, SOLUTION SUBCUTANEOUS at 12:16

## 2022-04-06 RX ADMIN — ALBUMIN (HUMAN) 25 G: 0.25 INJECTION, SOLUTION INTRAVENOUS at 17:13

## 2022-04-06 RX ADMIN — SODIUM CHLORIDE: 9 INJECTION, SOLUTION INTRAVENOUS at 16:53

## 2022-04-06 RX ADMIN — Medication 6 MG: at 21:03

## 2022-04-06 RX ADMIN — METOCLOPRAMIDE HYDROCHLORIDE 5 MG: 5 TABLET ORAL at 21:03

## 2022-04-06 RX ADMIN — MIRTAZAPINE 15 MG: 15 TABLET, FILM COATED ORAL at 21:03

## 2022-04-06 RX ADMIN — SODIUM CHLORIDE 500 ML: 9 INJECTION, SOLUTION INTRAVENOUS at 14:50

## 2022-04-06 RX ADMIN — LOPERAMIDE HYDROCHLORIDE 2 MG: 2 CAPSULE ORAL at 10:48

## 2022-04-06 RX ADMIN — TRIMETHOBENZAMIDE HYDROCHLORIDE 200 MG: 100 INJECTION INTRAMUSCULAR at 12:15

## 2022-04-06 RX ADMIN — ROPINIROLE 0.25 MG: 0.25 TABLET, FILM COATED ORAL at 21:03

## 2022-04-06 RX ADMIN — SODIUM CHLORIDE, PRESERVATIVE FREE 100 UNITS: 5 INJECTION INTRAVENOUS at 09:53

## 2022-04-06 RX ADMIN — DILTIAZEM HYDROCHLORIDE 90 MG: 30 TABLET, FILM COATED ORAL at 17:02

## 2022-04-06 RX ADMIN — DILTIAZEM HYDROCHLORIDE 90 MG: 30 TABLET, FILM COATED ORAL at 03:53

## 2022-04-06 ASSESSMENT — PAIN SCALES - GENERAL
PAINLEVEL_OUTOF10: 0
PAINLEVEL_OUTOF10: 9
PAINLEVEL_OUTOF10: 3
PAINLEVEL_OUTOF10: 10

## 2022-04-06 ASSESSMENT — PAIN DESCRIPTION - ONSET: ONSET: ON-GOING

## 2022-04-06 ASSESSMENT — PAIN DESCRIPTION - FREQUENCY: FREQUENCY: CONTINUOUS

## 2022-04-06 ASSESSMENT — PAIN SCALES - WONG BAKER: WONGBAKER_NUMERICALRESPONSE: 8

## 2022-04-06 ASSESSMENT — PAIN DESCRIPTION - PROGRESSION: CLINICAL_PROGRESSION: GRADUALLY WORSENING

## 2022-04-06 ASSESSMENT — PAIN DESCRIPTION - ORIENTATION: ORIENTATION: RIGHT;LEFT;UPPER;LOWER

## 2022-04-06 ASSESSMENT — PAIN DESCRIPTION - LOCATION: LOCATION: ABDOMEN

## 2022-04-06 ASSESSMENT — PAIN DESCRIPTION - DESCRIPTORS: DESCRIPTORS: ACHING;DISCOMFORT;TENDER

## 2022-04-06 ASSESSMENT — PAIN DESCRIPTION - PAIN TYPE: TYPE: ACUTE PAIN

## 2022-04-06 NOTE — PROGRESS NOTES
[address]  Internal Medicine Residency Program  Progress Note - House Team 2    Patient:  Jared Bernal 36 y.o. female MRN: [id number]     Date of Service: 4/6/2022     CC: AMS   Overnight events: NAEO     Subjective      On day 10 of hospital admission    Overnight no significant events   Patient admitted that yesterday evening she did attempt to eat but subsequently vomited.  Patient had x1 BM this AM.   Patient complains of continued abdominal pain and nausea today. Patient has not eaten anything this AM.     Objective     Physical Exam:  · Vitals: /81   Pulse 99   Temp 98.2 °F (36.8 °C) (Oral)   Resp 18   Ht 5' 4\" (1.626 m)   Wt 139 lb 5.3 oz (63.2 kg)   SpO2 94%   BMI 23.92 kg/m²       · Constitutional: Awake, alert, able to answer questions. Follows commands. In no apparent distress. · Head: Normocephalic and atraumatic. · Eyes: EOMI, conjunctiva normal, (-) scleral icterus. Mucus membranes moist.  · Mouth: Mucus membranes moist. Oropharynx clear. No deviation of the tongue or uvula. Normal dentition. · Neck: (-)  swelling, (-) JVD, trachea midline  · Respiratory: Lungs clear to auscultation bilaterally. (-)  wheezes, (-)  rales, (-)  rhonchi. No increased work of breathing or respiratory distress  · Cardiovascular: RRR. (-)  murmurs, (-) gallops,  (-) rubs. S1 and S2 were normal.   · GI:  Abdomen soft, non-tender, non-distended. (+) BS. (-) guarding, (-) rigidity. · Extremities: Warm and well perfused. (-) clubbing, (-) cyanosis. 2+ distal pulses. (-) peripheral edema. (-)Sacral pitting edema. · Neurologic:  No focal neurological deficits.   No tremor or overt seizure activity    Pertinent Labs & Imaging Studies   jorge alberto  CBC with Differential:    Lab Results   Component Value Date    WBC 4.6 04/06/2022    RBC 3.00 04/06/2022    HGB 7.7 04/06/2022    HCT 25.8 04/06/2022     04/06/2022    MCV 86.0 04/06/2022    MCH 25.7 04/06/2022    MCHC 29.8 04/06/2022    RDW 18.6 04/06/2022    NRBC 0.9 03/28/2022    SEGSPCT 67 06/27/2013    METASPCT 1.7 08/10/2020    LYMPHOPCT 24.2 04/06/2022    MONOPCT 9.4 04/06/2022    MYELOPCT 0.9 01/19/2022    BASOPCT 0.7 04/06/2022    MONOSABS 0.43 04/06/2022    LYMPHSABS 1.11 04/06/2022    EOSABS 0.22 04/06/2022    BASOSABS 0.03 04/06/2022     CMP:    Lab Results   Component Value Date     04/06/2022    K 4.2 04/06/2022    K 3.7 03/04/2022     04/06/2022    CO2 20 04/06/2022    BUN 13 04/06/2022    CREATININE 2.4 04/06/2022    GFRAA 29 04/06/2022    LABGLOM 29 04/06/2022    GLUCOSE 234 04/06/2022    GLUCOSE 130 05/18/2012    PROT 5.9 04/06/2022    LABALBU 2.8 04/06/2022    LABALBU 4.1 05/18/2012    CALCIUM 8.3 04/06/2022    BILITOT 0.3 04/06/2022    ALKPHOS 167 04/06/2022    AST 20 04/06/2022    ALT 17 04/06/2022     Magnesium:    Lab Results   Component Value Date    MG 2.1 04/06/2022     Phosphorus:    Lab Results   Component Value Date    PHOS 3.1 04/06/2022     Troponin:    Lab Results   Component Value Date    TROPONINI 0.12 05/14/2021     Resident's Assessment and Plan     Assessment     1. Nausea/Vomiting with Hx of gastroparesis 2/2 Hx of IDDM  2. HTN  3. ESRD on HD TTS  4. IDDM1-A1c 7.7% this admission  5. Hypothyroidism-TSH 5.37, FT4 1.10 normal  6. Acute on chronic anemia-s/p 1 unit PRBCs, chronic component likely due to ESRD  7. History of MDRO UTI  8. History of C. difficile infection    Resolved/Stable Problems:   · Acute hypoxic respiratory failure   · Acute metabolic encephalopathy-likely due to hypoglycemia in setting of DM1 and ESRD (CTH negative, negative UDS/SDS, NH3 normal, B12 normal 1 month ago)  · Aspiration pneumonia     Plan   Pepcid, Zofran and Bentyl to aid w/ abdominal pain/dyspepsia.  Patient given GI cocktail today and transitioned to clear liquid diet for complaints of nausea/vomiting.  Was not on Home Med list but patient admitted to taking Reglan at home.  Per chart review patient seen to be on Reglan at home and patient started on Reglan 5mg ACHS.  Per Endocrine, continue on 1U Lantus nightly w/ modified SSI. Flavio Miranda w/ Endocrine - Dr. Sandrine Rizzo. Plan to continue f/u as outpatient when pt discharged. Pt will likely need cosyntropin stimulation test as outpatient   o Per Endo, upon discharge, patient to be on Lantus 1u nighty with current sliding scale with meals.  Patient is NOT to be on bedtime sliding scale   Continue Synthroid 88 mcg daily   HD TThS per nephro   Continue Abilify and Celexa   Glucose checks ACHS   Appreciate Endo, nephro, infectious disease recommendations    PT/OT evaluation: Not indicated at this time  DVT prophylaxis/ GI prophylaxis: Heparin / Pepcid  Disposition: Continue current care    Mauricio Trimble MD, PGY-1  Attending physician: Dr. Araceli Handy

## 2022-04-06 NOTE — PROGRESS NOTES
[facility]  Internal Medicine Clinic    Attending Physician Statement:  Paty Lang D.O. I have discussed the case, including pertinent history and exam findings with the resident. I agree with the assessment, plan and orders as documented by the resident. Patient here for routine follow up of medical problems. IDDM2: multiple episodes of hypoglycemia as outpatient; endocrinology evaluating patient at this time; continue treatment per their recs; patient has not had her insulin during the last 2 days (unfortunately provider has never been notified that insulin was not given and is not ; patient currently in DKA which is why patient is having nausea and vomiting and diarrhea; currently starting fluid resuscitation and discussing with endocrinology for appropriate insulin treatment     End-stage renal disease on hemodialysis: Nephrology consulted and for dialysis on a TTS schedule, care per their recommendations,     Remainder of medical problems as per resident note. Addendum  Spoke with Endocrinology; discussed fluid resuscitation as well as insulin treatment; planning on using subq insulin per their recommendations to attempt to treat DKA on floor 2/2 patient's insulin sensitivity; if unable to treat DKA and have AG close will likely need transfer to ICU to treatment.   Awaiting further insulin orders per their recs     Electronically signed by Dakota Mcdonald DO on 4/6/2022 at 3:42 PM

## 2022-04-06 NOTE — PROGRESS NOTES
[facility]  Internal Medicine Residency Program  Progress Note - House Team     Patient:  Beata Ferraro 36 y.o. female MRN: [id number]     Date of Service: 4/6/2022     CC: Altered Mental Status     Subjective   Brief Summary:  Ms. Sarthak Valenzuela is a 36year old female who was found obtunded and severely hypoglycemic at her nursing home on 3/27. She has a past medical history of ESRD on hemodialysis, poorly controlled TIDM, HTN, hypothyroidism, and depression. She was intubated for airway protection and admitted to the [place] [address] ICU admission and treatment with antibiotics and steroids, patient's status improved significantly. Adrenal Insufficiency workup negative. She is currently being managed for brittle type 1 diabetes. Hospital Day: 11    This AM     Patient was seen and examined this morning at bedside  · Patient endorses severe nausea that is preventing her from eating. She states she had a few bites of chicken tenders yesterday but became extremely nauseous and could not finish her meal. Pt. Also endorses vomiting She still endorses her abdominal pain but it is improved from 2 days ago. Patient had a bowel movement as I was in the room.  Patient denies fevers, chills, chest pain, and shortness of breath. Objective     Physical Exam:  · Vitals: /81   Pulse 99   Temp 98.2 °F (36.8 °C) (Oral)   Resp 18   Ht 5' 4\" (1.626 m)   Wt 139 lb 5.3 oz (63.2 kg)   SpO2 94%   BMI 23.92 kg/m²     · I & O - 24hr: No intake/output data recorded.    · General Appearance: alert, appears stated age, cooperative, fatigued and mild distress  · Lung: clear to auscultation bilaterally  · Heart: regular rate and rhythm, S1, S2 normal, no murmur, click, rub or gallop  · Abdomen: soft, non-tender; bowel sounds normal; no masses,  no organomegaly  · Neurologic: Mental status: Alert, oriented, thought content appropriate  Subject  Pertinent Labs & Imaging Studies   jorge alberto  CBC:   Lab Results   Component Value Date    WBC 4.6 04/06/2022    RBC 3.00 04/06/2022    HGB 7.7 04/06/2022    HCT 25.8 04/06/2022    MCV 86.0 04/06/2022    MCH 25.7 04/06/2022    MCHC 29.8 04/06/2022    RDW 18.6 04/06/2022     04/06/2022    MPV 10.0 04/06/2022     Results for Rishabh Livingston (MRN [id number]) as of 4/6/2022 08:48   Ref. Range 4/6/2022 05:19   WBC Latest Ref Range: 4.5 - 11.5 E9/L 4.6   RBC Latest Ref Range: 3.50 - 5.50 E12/L 3.00 (L)   Hemoglobin Quant Latest Ref Range: 11.5 - 15.5 g/dL 7.7 (L)   Hematocrit Latest Ref Range: 34.0 - 48.0 % 25.8 (L)   MCV Latest Ref Range: 80.0 - 99.9 fL 86.0   MCH Latest Ref Range: 26.0 - 35.0 pg 25.7 (L)   MCHC Latest Ref Range: 32.0 - 34.5 % 29.8 (L)   MPV Latest Ref Range: 7.0 - 12.0 fL 10.0   RDW Latest Ref Range: 11.5 - 15.0 fL 18.6 (H)     XR CHEST PORTABLE   Final Result   1. The lungs are clear. There is no infiltrate or effusion. 2. Stable position of the support lines and tubes. XR CHEST PORTABLE   Final Result   1. There is no acute cardiopulmonary disease   2. Stable position of the support lines and tubes. XR CHEST PORTABLE   Final Result   Infiltrate and or atelectasis at the left lower lobe. Substantially resolved   infiltrate and or atelectasis on the right. New NG tube. CT HEAD WO CONTRAST   Final Result   No acute intracranial abnormality or hemorrhage. CT ABDOMEN PELVIS WO CONTRAST Additional Contrast? None   Final Result   1. Moderate ascites throughout abdomen and pelvis. 2.  Multiple thick walled loops of small bowel throughout the abdomen could   suggest nonspecific infectious or inflammatory enteritis. 3.  Generalized body wall edema. 4.  Small bilateral pleural effusions with adjacent atelectatic changes. XR ABDOMEN FOR NG/OG/NE TUBE PLACEMENT   Final Result   Enteric tube tip in the body of the stomach. XR CHEST PORTABLE   Final Result   Right perihilar opacity.          XR CHEST PORTABLE    (Results Pending)        Resident's Assessment and Plan     Assessment and Plan:    1. Acute metabolic encephalopathy 2/2 hypoglycemia. R/O Adrenal Insufficiency  - Resolved. - Cosyntropin stimulation test negative for adrenal insufficiency per endocrine.      2. TIDM  - Endo following.   - highly sensitive to insulin  - blood glucose elevated to 242 this morning despite minimal eating.    - patient placed on a modified low dose sliding scale with a max dose of 1 unit of Lantus daily. - no nighttime sliding scale      5. Abdominal Pain  - diffuse abdominal pain, nausea and vomiting while eating. Pain slightly improved, nausea still severe. - abdomen soft and nontender to palpation, no rebound or guarding.  - GI cocktail, clear liquid diet  - Reglan for diabetic gastroparesis as possible cause.       5. End Stage Renal Disease  - Continue dialysis Tues/Thurs/Saturday     6. Hypothyroidism  - continue levothyroxine 88mg daily.      7. Depression  - continue Abilify and Celexa     8.  Hypertension  - continue cardizem         PT/OT evaluation: Not indicated  DVT prophylaxis/ GI prophylaxis: Heparin/Pepcid  Disposition: continue current care     Dian Clemens, MS4  Attending physician: Dr. Gabriela Boland

## 2022-04-06 NOTE — CARE COORDINATION
Per Vinnie from [place], precert has been obtained is is good through today. Nephrology, Endocrinology ans ID are all following. Met with internal med, patient can discharge once eating and tolerating her diet. Per RN, patient is not tolerating diet and is having nausea, vomiting and diarrhea. Vinnie from [place] notified. She will follow and will restart precert as soon as patient is medically ready. She anticipates same day precert. updated PT/OT notes are in. Pt goes to HD at [facility] on T/T/S at 7 AM. Per Vinnie, no negative Covid-19 test needed. No Hens needed since patient is from this facility. Kari can set up transportation for this patient at time of discharge.    Mickey Daley RN CM  [phone]

## 2022-04-06 NOTE — PROGRESS NOTES
[address]  [facility], [address] [place] [address]                                                               Patient Name: Radha Rowland  MRN: [id number]  : 1990  Room: [address] [place] [address]    Evaluating OT: ELOINA Mark,  OTR/L; [id number]    Referring Provider: Abhishek Carbajal MD   Specific Provider Orders/Date: OT eval and treat (3/30/22)       Diagnosis: Altered mental status [F74.55]  Acute metabolic encephalopathy [P11.42]     Reason for admission: Pt admitted with AMS, hypoglycemia from SNF. Surgery/Procedures: None this admission.      Pertinent Medical History:    Past Medical History:   Diagnosis Date    Acute congestive heart failure ([address]     BC (acute kidney injury) ([address] 10/01/2019    Cardiac arrest ([address] 02/15/2021    Cephalgia 10/09/2019    Chronic kidney disease     Depression     Diabetes mellitus ([address]     Diabetic gastroparesis associated with type 1 diabetes mellitus ([address] 2018    Diabetic ketoacidosis ([address] 2011    Diabetic ketoacidosis with coma associated with type 1 diabetes mellitus ([address] 2013    Diabetic polyneuropathy associated with type 1 diabetes mellitus ([address] 2020    Drug use complicating pregnancy in third trimester     Endocarditis 10/31/2020    ESRD (end stage renal disease) ([address] 2020    H/O cardiovascular stress test 2021    Lexiscan    Hemodialysis patient [facility])     History of blood transfusion 2019    Hyperlipidemia 10/08/2020    Hyperosmolar hyperglycemic state (HHS) ([address] 2020    Hypothyroidism 10/08/2020    Iron deficiency anemia 10/01/2019    MDRO (multiple drug resistant organisms) resistance     MRSA (methicillin resistant Staphylococcus aureus)     back wound abcess    Non compliance w medication regimen 2016    Other disorders of kidney and ureter     Pregnancy 2016    16 weeks    Previous  delivery affecting pregnancy, antepartum 2017    Previous stillbirth or demise, antepartum 2016    Se[facility] 2020    Severe pre-eclampsia in third trimester 2016    Shock liver 02/15/2021    Valvular endocarditis 11/10/2020    This Diagnosis was added to the Problem List based on transcribed orders from Dr. Yvone Goodell           *Precautions:  Fall Risk, contact precautions    Assessment of current deficits   [x] Functional mobility  [x]ADLs  [x] Strength               [x]Cognition   [x] Functional transfers   [x] IADLs         [x] Safety Awareness   [x]Endurance   [] Fine Coordination        [] ROM     [] Vision/perception   []Sensation    []Gross Motor Coordination [x] Balance   [] Delirium                  []Motor Control     [] Communication    OT PLAN OF CARE   OT POC based on physician orders, patient diagnosis and results of clinical assessment.        Frequency/Duration: 1-3 days/wk for 1-2 weeks PRN    Specific OT Treatment Interventions to include:   * Instruction/training on adapted ADL techniques and AE recommendations to increase functional independence within precautions       * Training on energy conservation strategies, correct breathing pattern and techniques to improve independence/tolerance for self-care routine  * Functional transfer/mobility training/DME recommendations for increased independence, safety, and fall prevention  * Patient/Family education to increase follow through with safety techniques and functional independence  * Recommendation of environmental modifications for increased safety with functional transfers/mobility and ADLs  * Cognitive retraining/development of therapeutic activities to improve problem solving, judgement, memory, and attention for increased safety/participation in ADL/IADL tasks  * Sensory re-education to improve body/limb awareness, maintain/improve skin integrity, and improve hand/UE motor function  * Visual-perceptual training to improve environmental scanning, visual attention/focus, and oculomotor skills for increased safety/independence with functional transfers/mobility and ADLs  * Splinting/positioning for increased function, prevention of contractures, and improve skin integrity  * Therapeutic exercise to improve motor endurance, ROM, and functional strength for ADLs/functional transfers  * Therapeutic activities to facilitate/challenge dynamic balance, stand tolerance for increased safety and independence with ADLs  * Therapeutic activities to facilitate gross/fine motor skills for increased independence with ADLs  * Neuro-muscular re-education: facilitation of righting/equilibrium reactions, midline orientation, scapular stability/mobility, normalization of muscle tone, and facilitation of volitional active controled movement  * Positioning to improve skin integrity, interaction with environment and functional independence  * Delirium prevention/treatment  * Manual techniques for edema management      Recommended Adaptive Equipment: shower chair, TBD     Home Living: Pt lives Mother and 2 sisters  in a 1 story home with 4 step(s) to enter and no rail(s); bed/bath on main floor. Bathroom setup: tub/shower  Equipment owned: wc, walker. Prior Level of Function: Per patient received assist with ADLs; Assist with IADLs. Walker/wc for ambulation. Driving: No  Occupation: None    Pain Level: pt c/o 7/10 pain this session, however unable to state location. Cognition: A&O: 3/4  Not oriented to day; Follows 2 step commands with MIN verbal cues. Pt requiring increased time to complete tasks d/t increased fatigue and max encouragement to participate d/t increased pain. Memory: G-   Comprehension: G-   Problem solving: F+   Judgement/safety: F+               Communication skills: WFL; pt pleasant and cooperative. Vision: [employer] [facility] [employer]               Glasses: Yes Hearing: [facility]      UE Assessment:  Hand Dominance: Right [x]  Left []     ROM Strength   RUE  WFL Grossly 3+/5   LUE WFL Grossly 3+/5     Sensation: No c/o numbness/tingling in BUE extremities. Tone: WNL  Edema: Unremarkable. Functional Assessment:  AM-PAC Daily Activity Raw Score: 17/24   Initial Eval Status  Date: 3/31/22 Treatment Status  Date: 4/6/22 STGs = LTGs  Time frame: 7-14 days   Feeding Sup  Increased time and effort to complete task. Ind. Ind.       Grooming Min A  While standing at sink to wash hands. SBA  While seated at EOB to wash face. Increased fatigue this date requiring max encouragement to participation. Limited by pain. Ind.        UB dressing/bathing Min A  While seated at EOB. Max verbal cues for task completion d/t fatigue. SBA  While seated EOB to don gown. Ind.       LB dressing/bathing Mod A overall  Min A  While seated on lowered surface to don/doff socks. Increased time and effort to complete with mod verbal cues. Min A  To don socks while seated at EOB. Ind.        Toileting Max A  Max A for STS from commode, Mod A for thoroughness of hygiene. Pt incontinent of bowel during STS transfers. Max A  For posterior hygiene. SBA     Bed Mobility  Supine to sit:   Min A    Sit to supine:   Min A Sup to sit:  Min A    Sit to supine:   SBA                     Ind.     Functional Transfers Sit to stand:   Min A    Stand to sit:   Min A    Commode: Max A with use of grab bars. Sit to stand:   Min A from EOB  With hand held assist    Stand to sit:   Min A to EOB with hand held assist. STS: Ind. Commode: SBA   Functional Mobility Min A with FWW  For short household distances  Bed<>bathroom. Min A with hand held assist requiring increased encouragement d/t pain for short household distances. Ind.         Balance Sitting:     Static: CGA Dynamic: Min A  Standing: Min A Sitting:     Static: SBA    Dynamic: SBA  Standing: Min A  Sitting:     Static: Ind. Dynamic: Ind.  Standing: Ind. Endurance/Activity Tolerance   poor tolerance with light activity. Fair tolerance with light activity. [facility]   Visual/  Perceptual Impaired: pt reporting increased blurriness. Glasses: yes                           Treatment: OT treatment provided this date includes:      Instruction/training on safety and adapted techniques for completion of ADLs: to increase independence, safety, and alertness during self-care.   Instruction/training on safe bed mobility/functional mobility/transfer techniques: with focus on safety, body mechanics, and precautions   Sitting Balance/Tolerance: pt positioned up in chair to increase balance and activity tolerance during ADLs and facilitate proper posture and positioning. Comments: OK from RN to see patient. Upon arrival, patient supine in bed. Pt demo poor tolerance with fair understanding of education/techniques. At end of session, patient supine in bed. Call light within reach, all lines and tubes intact. Pt instructed on use of call light for assistance and fall prevention. Nursing notified of patient positioning. Patient presents with decreased ROM/strength, activity tolerance, dynamic balance, functional mobility limiting completion of ADLs and safety. Pt can benefit from continued skilled OT to increase safety, functional independence and quality of life.        Time In: 11:21 AM             Time Out: 11:45 AM         Total Treatment time: 24 minutes   Min Units   OT Eval Low 22304     OT Eval Medium 00651     OT Eval High [id number]     OT Re-Eval [id number]     Therapeutic Ex [id number]     Therapeutic Activities 70145     ADL/Self Care 18860 24 2   Orthotic Management 61907     Neuro Re-Ed 86310     Non-Billable Time         Randolph Daley, ELOINA,  OTR/L; [id number]

## 2022-04-06 NOTE — PROGRESS NOTES
Physical Therapy    Treatment Note    Name: Radha Rowland  : 1990  MRN: [id number]      Date of Service: 2022    Evaluating PT:  Marty Magana PT, DPT  [id number]     Room #:  1646/8683-S  Diagnosis:  Altered mental status [C48.66]  Acute metabolic encephalopathy [D10.20]  PMHx/PSHx:  Acute CHF, cardiac arrest, DM, diabetic gastroparesis, diabetic ketoacidosis, diabetic polyneuropathy, ESRD on HD, HLD, MDRO, chronic Cdiff  Procedure/Surgery:  Intubated 3/27, extubated 3/30   Precautions:  Falls, Contact isolation  Equipment Needs:  NA    SUBJECTIVE:    Pt admitted from nursing facility. Ambulates with FWW vs. W/C and assistance from staff. Pt plans to return home with her mom in a 2 story home with 4 steps to enter and first floor set-up. OBJECTIVE:   Initial Evaluation  Date: 3/31/22 Treatment Date: 22 Short Term/ Long Term   Goals   AM-PAC 6 Clicks     Was pt agreeable to Eval/treatment? Yes  Yes     Does pt have pain? 7/10 HA Abdominal pain     Bed Mobility  Rolling: SBA  Supine to sit: Min A  Sit to supine: NT  Scooting: SBA Rolling: SBA  Supine to sit: Min A  Sit to supine: NT  Scooting: SBA to EOB Modified Independent     Transfers Sit to stand: Min A from bed, Max A from toilet  Stand to sit: Min A  Stand pivot: Min A with FWW  Sit to stand: Min A  Stand to sit: Min A  Stand pivot: Min A without AD Modified Independent with FWW   Ambulation    20 feet x2 with FWW Min A    Limited by contact isolation room 20 feet with HHA with Min A >150 feet with FWW Modified Independent     Stair negotiation: ascended and descended  NT NT >4 steps with 1 rail Modified Independent     ROM BUE:  Per OT eval  BLE:  WFL NT    Strength BUE:  Per OT eval  BLE:  WFL NT    Balance Sitting EOB:  SBA  Dynamic Standing:  Min A Sitting EOB: Supervision  Dynamic Standing: Min A with HHA Sitting EOB:  Independent   Dynamic Standing:  Modified Independent       Pt is A & O x: Not formally assessed.  Pt is grossly oriented. Sensation: NT  Edema: NT    Patient education  Pt educated on PT role in acute care setting. Patient response to education:   Pt verbalized understanding Pt demonstrated skill Pt requires further education in this area   Yes NA No     ASSESSMENT:    Comments:    Pt was in bed upon room entry; agreeable to PT treatment. Pt has flat affect and required maximum encouragement to participate but pt was pleasant and cooperative. Pt required assistance for trunk mobility during supine to sit transfer. Pt was emotional due to pain; emotional support provided. Pt sidestepped and ambulated around foot of bed with HHA. Gait was slow and unsteady. Pt has narrow GENNA. No LOB noted. Pt requested to return to bed following activity. Pt was left in bed with all needs met at conclusion of session. RN notified of pt's request for pain meds. Treatment:  Patient practiced and was instructed in the following treatment:     Therapeutic activities:  o Bed mobility: Pt was cued for technique during bed mobility transfers. o Transfers: Pt was cued for hand placement during sit <> stand transfers. Pt completed x2 transfers from EOB. o Ambulation: Pt sidestepped at EOB and ambulated around foot of bed. Pt was cued for safety. o Sitting EOB: Pt sat at EOB for 10+ minutes. Dynamic sitting balance and posture were challenged.  o Skillful positioning in bed to protect skin/joint integrity. PLAN:    Patient is making Fair progress towards established goals. Will continue with current POC.       Time in: 1130  Time out: 1155    Total Treatment Time 25 minutes     CPT codes:  [] Gait training 17892 0 minutes  [] Manual therapy 33292 0 minutes  [x] Therapeutic activities 64062 25 minutes  [] Therapeutic exercises 98328 0 minutes  [] Neuromuscular reeducation 88596 0 minutes      Pari De La Torre PT, DPT   [id number]

## 2022-04-06 NOTE — PROGRESS NOTES
Department of Internal Medicine  Nephrology Progress Note      Events reviewed. Patient seen in room. patient currently in DKA     SUBJECTIVE:  We are following Miss Humaira Perez for ESKD on HD.  Patient reports has nausea, emesis    PHYSICAL EXAM:      Vitals:    VITALS:  /78   Pulse 94   Temp 98 °F (36.7 °C) (Oral)   Resp 16   Ht 5' 4\" (1.626 m)   Wt 139 lb 5.3 oz (63.2 kg)   SpO2 95%   BMI 23.92 kg/m²   24HR INTAKE/OUTPUT:    No intake or output data in the 24 hours ending 04/06/22 1636    Access: RIJ tunneled dialysis catheter  Constitutional: Awake, alert, NAD  HEENT:  PERRL, normocephalic, atraumatic  Respiratory:  CTA bilateral  Cardiovascular/Edema:  ST, RRR, no murmur gallop or rub  Gastrointestinal:  abd distended, c/o persistent abdominal pain  Neurologic: A&OX3 follows commands, no focal deficit  Skin:  Warm, dry, ecchymotic areas to abdomen    Other:    Scheduled Meds:   metoclopramide  5 mg Oral 4x Daily AC & HS    dicyclomine  10 mg Oral TID AC    insulin glargine  1 Units SubCUTAneous QAM    sodium chloride  500 mL IntraVENous Once    melatonin  6 mg Oral Nightly    insulin lispro  0-3 Units SubCUTAneous TID WC    sodium chloride flush  5-40 mL IntraVENous 2 times per day    heparin flush  1 mL IntraVENous 2 times per day    dilTIAZem  90 mg Oral Q6H    mirtazapine  15 mg Oral Nightly    escitalopram  10 mg Oral Daily    ARIPiprazole  5 mg Oral Nightly    rOPINIRole  0.25 mg Oral Nightly    levothyroxine  88 mcg Oral Daily    famotidine  10 mg Oral Daily    epoetin nena-epbx  3,000 Units SubCUTAneous Once per day on Mon Wed Fri    Vitamin D  1,000 Units Per NG tube Daily     Continuous Infusions:   sodium chloride      sodium chloride      sodium chloride      sodium chloride      dextrose       PRN Meds:.ondansetron, trimethobenzamide, sodium chloride, loperamide, sodium chloride, sodium chloride flush, sodium chloride, heparin flush, white petrolatum, polyethylene glycol, acetaminophen **OR** acetaminophen, glucose, dextrose, glucagon (rDNA), dextrose, albuterol, labetalol    DATA:    CBC:   Lab Results   Component Value Date    WBC 4.6 04/06/2022    RBC 3.00 04/06/2022    HGB 7.7 04/06/2022    HCT 25.8 04/06/2022    MCV 86.0 04/06/2022    MCH 25.7 04/06/2022    MCHC 29.8 04/06/2022    RDW 18.6 04/06/2022     04/06/2022    MPV 10.0 04/06/2022     CMP:    Lab Results   Component Value Date     04/06/2022    K 4.4 04/06/2022    K 3.7 03/04/2022    CL 97 04/06/2022    CO2 16 04/06/2022    BUN 17 04/06/2022    CREATININE 2.8 04/06/2022    GFRAA 24 04/06/2022    LABGLOM 24 04/06/2022    GLUCOSE 276 04/06/2022    GLUCOSE 130 05/18/2012    PROT 5.9 04/06/2022    LABALBU 2.8 04/06/2022    LABALBU 4.1 05/18/2012    CALCIUM 8.6 04/06/2022    BILITOT 0.3 04/06/2022    ALKPHOS 167 04/06/2022    AST 20 04/06/2022    ALT 17 04/06/2022     Magnesium:    Lab Results   Component Value Date    MG 2.1 04/06/2022     Phosphorus:    Lab Results   Component Value Date    PHOS 3.1 04/06/2022     Radiology Review:      CT Head WO Contrast March 27, 2022   No acute intracranial abnormality or hemorrhage.         CT Abdomen Pelvis WO Contrast March 27, 2022   1.  Moderate ascites throughout abdomen and pelvis.       2.  Multiple thick walled loops of small bowel throughout the abdomen could   suggest nonspecific infectious or inflammatory enteritis.       3.  Generalized body wall edema.       4.  Small bilateral pleural effusions with adjacent atelectatic changes.             Chest X-Ray March 28, 2022   Infiltrate and or atelectasis at the left lower lobe.  Substantially resolved   infiltrate and or atelectasis on the right.  New NG tube.               BRIEF SUMMARY OF INITIAL CONSULT:    Briefly, Miss Krishna Caraballo is a 36year-old female with a PMH of ESRD on hemodialysis 3 days/wk, on TTS schedule at [facility] I DM, poorly controlled with recurrent admissions for DKA, HTN, gastroparesis, ascites, infective endocarditis, cardiac arrest, hypothyroidism, recent Covid, and depression who was admitted on March 27, 2022 after she was found unresponsive at the SNF where she resides. Patient was found to be profoundly hypoglycemic and EMS was called. She was intubated in ER for airway protection as she remained unresponsive. CT head showed no acute intracranial abnormality or hemorrhage, chest x-ray demonstrates right perihilar opacity concerning for aspiration pneumonia. She was ultimately admitted to MICU for further evaluation. Labs on admission were significant for sodium 135, potassium 5.0, chloride 100, bicarbonate 25, BUN 38, creatinine 4.9, proBNP >70,000. We are consulted for dialysis management. Problems resolved. · Hypoglycemia, was on D10, now hyperglycemic with +ketones (beta-hydroxybutyrate >4.5)  · Acute respiratory failure, 2/2 aspiration pneumonia? On 40% Fio2. Extubated 4/73  · Acute metabolic encephalopathy 2/2 hypoglycemia. Resolving   · Acidemia, pH 7.311, PCO2 35.0, HCO3 33.2, metabolic acidosis from DKA    IMPRESSION/RECOMMENDATIONS:      1. ESKD 3 times a week TTS via RIJ tunneled dialysis catheter. HD initiated September 2021 after failed peritoneal dialysis with persistent hyperkalemia. For dialysis three times/wk TTS while inpatient. Having dialysis today. 2. HTN, on cardizem 90 mg po qid  3. MBD of CKD, currently with hypophosphatemia. Binders d/c'd  4. Anemia of CKD, continue epoetin alpha 3,000 unit 3 times/wk. s/p transfusion   5. Vitamin D deficiency, on ergocalciferol 1000 po daily  ------------------------------------------------------  6. Type I DM, poorly controlled with frequent readmissions for DKA, on SSI, lantus decreased to 1 unit nightly  7. Aspiration pneumonia? S/p piperacillin    8. Restless leg syndrome, on ropinirole at home  9. Depression, on escitalopram and Abilify  10. Hypothyroidism, on levothyroxine   11.  History of gastroparesis, on metoclopramide at home. For EGD and pyloric dilatation with Botox injection outpatient per GI.  12. Hx recurrent C. difficile infection, on flagyl po Q 8hrs   13. Hx of MDRO UTI  14. Nutrition, diabetic diet  15.  DKA     Plan:     · Continue HD today and three times/wk, TTS while inpatient  · Agree with fluid resuscitation and insulin treatment of DKA   · Start SPA 25 gm iv tid x 6 doses  · Continue epoetin alpha 3,000 units 3 times/wk  · Continue to monitor labs daily   · Continue to monitor glucose levels  · Monitor phosphorus levels   · Monitor H&H, transfuse <7, receiving transfusion  · Discharge planning    Electronically signed by Rachel Deng MD on 4/6/2022 at 4:36 PM

## 2022-04-07 LAB
ABO/RH: NORMAL
ALBUMIN SERPL-MCNC: 3.2 G/DL (ref 3.5–5.2)
ALP BLD-CCNC: 152 U/L (ref 35–104)
ALT SERPL-CCNC: 12 U/L (ref 0–32)
ANION GAP SERPL CALCULATED.3IONS-SCNC: 12 MMOL/L (ref 7–16)
ANION GAP SERPL CALCULATED.3IONS-SCNC: 14 MMOL/L (ref 7–16)
ANTIBODY SCREEN: NORMAL
AST SERPL-CCNC: 10 U/L (ref 0–31)
BASOPHILS ABSOLUTE: 0.01 E9/L (ref 0–0.2)
BASOPHILS RELATIVE PERCENT: 0.3 % (ref 0–2)
BILIRUB SERPL-MCNC: 0.3 MG/DL (ref 0–1.2)
BILIRUBIN DIRECT: <0.2 MG/DL (ref 0–0.3)
BILIRUBIN, INDIRECT: ABNORMAL MG/DL (ref 0–1)
BLOOD BANK DISPENSE STATUS: NORMAL
BLOOD BANK PRODUCT CODE: NORMAL
BPU ID: NORMAL
BUN BLDV-MCNC: 18 MG/DL (ref 6–20)
BUN BLDV-MCNC: 5 MG/DL (ref 6–20)
CALCIUM IONIZED: 1.23 MMOL/L (ref 1.15–1.33)
CALCIUM SERPL-MCNC: 8.2 MG/DL (ref 8.6–10.2)
CALCIUM SERPL-MCNC: 8.4 MG/DL (ref 8.6–10.2)
CHLORIDE BLD-SCNC: 104 MMOL/L (ref 98–107)
CHLORIDE BLD-SCNC: 104 MMOL/L (ref 98–107)
CO2: 23 MMOL/L (ref 22–29)
CO2: 24 MMOL/L (ref 22–29)
CREAT SERPL-MCNC: 1.6 MG/DL (ref 0.5–1)
CREAT SERPL-MCNC: 3.4 MG/DL (ref 0.5–1)
DESCRIPTION BLOOD BANK: NORMAL
EKG ATRIAL RATE: 96 BPM
EKG P AXIS: 41 DEGREES
EKG P-R INTERVAL: 136 MS
EKG Q-T INTERVAL: 370 MS
EKG QRS DURATION: 80 MS
EKG QTC CALCULATION (BAZETT): 467 MS
EKG R AXIS: 53 DEGREES
EKG T AXIS: 110 DEGREES
EKG VENTRICULAR RATE: 96 BPM
EOSINOPHILS ABSOLUTE: 0.19 E9/L (ref 0.05–0.5)
EOSINOPHILS RELATIVE PERCENT: 5.1 % (ref 0–6)
GFR AFRICAN AMERICAN: 19
GFR AFRICAN AMERICAN: 46
GFR NON-AFRICAN AMERICAN: 19 ML/MIN/1.73
GFR NON-AFRICAN AMERICAN: 46 ML/MIN/1.73
GLUCOSE BLD-MCNC: 103 MG/DL (ref 74–99)
GLUCOSE BLD-MCNC: 148 MG/DL (ref 74–99)
HCT VFR BLD CALC: 21.8 % (ref 34–48)
HCT VFR BLD CALC: 32.9 % (ref 34–48)
HEMOGLOBIN: 10.2 G/DL (ref 11.5–15.5)
HEMOGLOBIN: 6.8 G/DL (ref 11.5–15.5)
IMMATURE GRANULOCYTES #: 0.02 E9/L
IMMATURE GRANULOCYTES %: 0.5 % (ref 0–5)
LYMPHOCYTES ABSOLUTE: 0.9 E9/L (ref 1.5–4)
LYMPHOCYTES RELATIVE PERCENT: 23.9 % (ref 20–42)
MAGNESIUM: 2.1 MG/DL (ref 1.6–2.6)
MCH RBC QN AUTO: 25.3 PG (ref 26–35)
MCHC RBC AUTO-ENTMCNC: 31.2 % (ref 32–34.5)
MCV RBC AUTO: 81 FL (ref 80–99.9)
METER GLUCOSE: 106 MG/DL (ref 74–99)
METER GLUCOSE: 112 MG/DL (ref 74–99)
METER GLUCOSE: 97 MG/DL (ref 74–99)
METER GLUCOSE: 98 MG/DL (ref 74–99)
MONOCYTES ABSOLUTE: 0.45 E9/L (ref 0.1–0.95)
MONOCYTES RELATIVE PERCENT: 12 % (ref 2–12)
NEUTROPHILS ABSOLUTE: 2.19 E9/L (ref 1.8–7.3)
NEUTROPHILS RELATIVE PERCENT: 58.2 % (ref 43–80)
PDW BLD-RTO: 19.3 FL (ref 11.5–15)
PHOSPHORUS: 3.9 MG/DL (ref 2.5–4.5)
PLATELET # BLD: 209 E9/L (ref 130–450)
PMV BLD AUTO: 10.1 FL (ref 7–12)
POTASSIUM SERPL-SCNC: 4.1 MMOL/L (ref 3.5–5)
POTASSIUM SERPL-SCNC: 4.1 MMOL/L (ref 3.5–5)
RBC # BLD: 2.69 E12/L (ref 3.5–5.5)
SODIUM BLD-SCNC: 139 MMOL/L (ref 132–146)
SODIUM BLD-SCNC: 142 MMOL/L (ref 132–146)
TOTAL PROTEIN: 5.8 G/DL (ref 6.4–8.3)
WBC # BLD: 3.8 E9/L (ref 4.5–11.5)

## 2022-04-07 PROCEDURE — 1200000000 HC SEMI PRIVATE

## 2022-04-07 PROCEDURE — 6360000002 HC RX W HCPCS: Performed by: INTERNAL MEDICINE

## 2022-04-07 PROCEDURE — 82330 ASSAY OF CALCIUM: CPT

## 2022-04-07 PROCEDURE — 86923 COMPATIBILITY TEST ELECTRIC: CPT

## 2022-04-07 PROCEDURE — 6370000000 HC RX 637 (ALT 250 FOR IP): Performed by: INTERNAL MEDICINE

## 2022-04-07 PROCEDURE — 86850 RBC ANTIBODY SCREEN: CPT

## 2022-04-07 PROCEDURE — 36430 TRANSFUSION BLD/BLD COMPNT: CPT

## 2022-04-07 PROCEDURE — 85014 HEMATOCRIT: CPT

## 2022-04-07 PROCEDURE — 99232 SBSQ HOSP IP/OBS MODERATE 35: CPT | Performed by: INTERNAL MEDICINE

## 2022-04-07 PROCEDURE — P9047 ALBUMIN (HUMAN), 25%, 50ML: HCPCS | Performed by: INTERNAL MEDICINE

## 2022-04-07 PROCEDURE — 93010 ELECTROCARDIOGRAM REPORT: CPT | Performed by: INTERNAL MEDICINE

## 2022-04-07 PROCEDURE — P9016 RBC LEUKOCYTES REDUCED: HCPCS

## 2022-04-07 PROCEDURE — 2700000000 HC OXYGEN THERAPY PER DAY

## 2022-04-07 PROCEDURE — 6370000000 HC RX 637 (ALT 250 FOR IP): Performed by: STUDENT IN AN ORGANIZED HEALTH CARE EDUCATION/TRAINING PROGRAM

## 2022-04-07 PROCEDURE — 2580000003 HC RX 258: Performed by: INTERNAL MEDICINE

## 2022-04-07 PROCEDURE — 86900 BLOOD TYPING SEROLOGIC ABO: CPT

## 2022-04-07 PROCEDURE — 86901 BLOOD TYPING SEROLOGIC RH(D): CPT

## 2022-04-07 PROCEDURE — 6370000000 HC RX 637 (ALT 250 FOR IP): Performed by: NURSE PRACTITIONER

## 2022-04-07 PROCEDURE — 84100 ASSAY OF PHOSPHORUS: CPT

## 2022-04-07 PROCEDURE — 83735 ASSAY OF MAGNESIUM: CPT

## 2022-04-07 PROCEDURE — 80048 BASIC METABOLIC PNL TOTAL CA: CPT

## 2022-04-07 PROCEDURE — 82962 GLUCOSE BLOOD TEST: CPT

## 2022-04-07 PROCEDURE — 85018 HEMOGLOBIN: CPT

## 2022-04-07 PROCEDURE — 36415 COLL VENOUS BLD VENIPUNCTURE: CPT

## 2022-04-07 PROCEDURE — 90935 HEMODIALYSIS ONE EVALUATION: CPT | Performed by: INTERNAL MEDICINE

## 2022-04-07 PROCEDURE — 80076 HEPATIC FUNCTION PANEL: CPT

## 2022-04-07 PROCEDURE — 85025 COMPLETE CBC W/AUTO DIFF WBC: CPT

## 2022-04-07 PROCEDURE — S5553 INSULIN LONG ACTING 5 U: HCPCS | Performed by: INTERNAL MEDICINE

## 2022-04-07 RX ORDER — INSULIN GLARGINE-YFGN 100 [IU]/ML
1 INJECTION, SOLUTION SUBCUTANEOUS NIGHTLY
Status: DISCONTINUED | OUTPATIENT
Start: 2022-04-07 | End: 2022-04-07

## 2022-04-07 RX ORDER — SODIUM CHLORIDE 9 MG/ML
INJECTION, SOLUTION INTRAVENOUS PRN
Status: DISCONTINUED | OUTPATIENT
Start: 2022-04-07 | End: 2022-04-13

## 2022-04-07 RX ORDER — POTASSIUM BICARBONATE 25 MEQ/1
25 TABLET, EFFERVESCENT ORAL ONCE
Status: DISCONTINUED | OUTPATIENT
Start: 2022-04-07 | End: 2022-04-07

## 2022-04-07 RX ORDER — METOCLOPRAMIDE 5 MG/1
5 TABLET ORAL 4 TIMES DAILY PRN
Status: DISCONTINUED | OUTPATIENT
Start: 2022-04-07 | End: 2022-04-11

## 2022-04-07 RX ADMIN — DICYCLOMINE HYDROCHLORIDE 10 MG: 10 CAPSULE ORAL at 10:47

## 2022-04-07 RX ADMIN — MIRTAZAPINE 15 MG: 15 TABLET, FILM COATED ORAL at 21:43

## 2022-04-07 RX ADMIN — DILTIAZEM HYDROCHLORIDE 90 MG: 30 TABLET, FILM COATED ORAL at 21:43

## 2022-04-07 RX ADMIN — ONDANSETRON 4 MG: 2 INJECTION INTRAMUSCULAR; INTRAVENOUS at 11:37

## 2022-04-07 RX ADMIN — METOCLOPRAMIDE 5 MG: 5 TABLET ORAL at 18:15

## 2022-04-07 RX ADMIN — Medication 10 ML: at 10:48

## 2022-04-07 RX ADMIN — FAMOTIDINE 10 MG: 20 TABLET ORAL at 10:47

## 2022-04-07 RX ADMIN — ONDANSETRON 4 MG: 2 INJECTION INTRAMUSCULAR; INTRAVENOUS at 17:50

## 2022-04-07 RX ADMIN — ARIPIPRAZOLE 5 MG: 5 TABLET ORAL at 21:43

## 2022-04-07 RX ADMIN — ALBUMIN (HUMAN) 25 G: 0.25 INJECTION, SOLUTION INTRAVENOUS at 01:00

## 2022-04-07 RX ADMIN — SODIUM CHLORIDE, PRESERVATIVE FREE 100 UNITS: 5 INJECTION INTRAVENOUS at 21:43

## 2022-04-07 RX ADMIN — ALBUMIN (HUMAN) 25 G: 0.25 INJECTION, SOLUTION INTRAVENOUS at 18:15

## 2022-04-07 RX ADMIN — INSULIN GLARGINE-YFGN 1 UNITS: 100 INJECTION, SOLUTION SUBCUTANEOUS at 10:48

## 2022-04-07 RX ADMIN — Medication 10 ML: at 21:43

## 2022-04-07 RX ADMIN — ESCITALOPRAM 10 MG: 10 TABLET, FILM COATED ORAL at 10:46

## 2022-04-07 RX ADMIN — Medication 1000 UNITS: at 10:47

## 2022-04-07 RX ADMIN — ROPINIROLE 0.25 MG: 0.25 TABLET, FILM COATED ORAL at 21:43

## 2022-04-07 RX ADMIN — Medication 6 MG: at 21:43

## 2022-04-07 RX ADMIN — DILTIAZEM HYDROCHLORIDE 90 MG: 30 TABLET, FILM COATED ORAL at 17:47

## 2022-04-07 RX ADMIN — DILTIAZEM HYDROCHLORIDE 90 MG: 30 TABLET, FILM COATED ORAL at 10:46

## 2022-04-07 RX ADMIN — ALBUMIN (HUMAN) 25 G: 0.25 INJECTION, SOLUTION INTRAVENOUS at 10:51

## 2022-04-07 RX ADMIN — SODIUM CHLORIDE, PRESERVATIVE FREE 100 UNITS: 5 INJECTION INTRAVENOUS at 10:47

## 2022-04-07 RX ADMIN — DICYCLOMINE HYDROCHLORIDE 10 MG: 10 CAPSULE ORAL at 17:47

## 2022-04-07 ASSESSMENT — PAIN SCALES - GENERAL
PAINLEVEL_OUTOF10: 0
PAINLEVEL_OUTOF10: 7
PAINLEVEL_OUTOF10: 0
PAINLEVEL_OUTOF10: 0

## 2022-04-07 ASSESSMENT — PAIN DESCRIPTION - LOCATION: LOCATION: ABDOMEN

## 2022-04-07 ASSESSMENT — PAIN DESCRIPTION - PROGRESSION: CLINICAL_PROGRESSION: GRADUALLY WORSENING

## 2022-04-07 ASSESSMENT — PAIN SCALES - WONG BAKER: WONGBAKER_NUMERICALRESPONSE: 8

## 2022-04-07 NOTE — CARE COORDINATION
Plan is [facility] when medically ready. Met with internal med, patient has not had her insulin during the last 2 days; patient was in DKA which is why patient is having nausea and vomiting and diarrhea; currently with fluid resuscitation and endocrinology for appropriate insulin treatment. If patient tolerates her clear liquid diet then she will be ready for discharge. Melvin Bedolla notified to start precert. Updated PT/OT notes are in. Pt goes to HD at [facility] in [place] on T/T/S at 7 AM. Per Melvin Bedolla, no negative Covid-19 test needed.  No Hens needed since patient is from this facility. Kari can set up transportation for this patient at time of discharge.    Doreen Zavala RN CM  [phone]

## 2022-04-07 NOTE — PROGRESS NOTES
[facility]  Internal Medicine Residency Program  Progress Note - House Team     Patient:  Andi Schwartz 36 y.o. female MRN: [id number]     Date of Service: 4/7/2022     CC: AMS  Overnight events: AG closed with resolution of DKA     Subjective     Patient was seen and examined this morning at bedside in no acute distress. Overnight, AG closed to 14 with resolution of metabolic acidosis. Patient states that she continues to have nausea and about 2-3 small episodes of emesis. States that abdominal pain has improved. Patients Hb was 6.8 this AM, likely dilutional from fluids, but will give 1U RBC since she will be going to dialysis this AM. Denies having any chest pain, shortness of breath, fevers, chills, diarrhea, hematemesis, hematochezia, melena, hemoptysis. Objective     Physical Exam:  · Vitals: BP (!) 150/90   Pulse 102   Temp 98.1 °F (36.7 °C) (Oral)   Resp 16   Ht 5' 4\" (1.626 m)   Wt 138 lb (62.6 kg)   SpO2 98%   BMI 23.69 kg/m²     · I & O - 24hr: I/O this shift:  · In: 300   · Out: 2700    · General Appearance: alert, appears stated age and cooperative  · HEENT:  Head: Normocephalic, no lesions, without obvious abnormality. · Neck: no adenopathy, no carotid bruit, no JVD, supple, symmetrical, trachea midline and thyroid not enlarged, symmetric, no tenderness/mass/nodules  · Lung: clear to auscultation bilaterally  · Heart: regular rate and rhythm, S1, S2 normal, no murmur, click, rub or gallop  · Abdomen: soft, non-tender; bowel sounds normal; no masses,  no organomegaly  · Extremities:  extremities normal, atraumatic, no cyanosis or edema  · Musculokeletal: No joint swelling, no muscle tenderness. ROM normal in all joints of extremities.    · Neurologic: Mental status: Alert, oriented, thought content appropriate  Subject  Pertinent Labs & Imaging Studies   jorge alberto  CBC:   Lab Results   Component Value Date    WBC 3.8 04/07/2022    RBC 2.69 04/07/2022    HGB 6.8 04/07/2022 HCT 21.8 04/07/2022    MCV 81.0 04/07/2022    MCH 25.3 04/07/2022    MCHC 31.2 04/07/2022    RDW 19.3 04/07/2022     04/07/2022    MPV 10.1 04/07/2022     BMP:    Lab Results   Component Value Date     04/07/2022    K 4.1 04/07/2022    K 3.7 03/04/2022     04/07/2022    CO2 24 04/07/2022    BUN 18 04/07/2022    LABALBU 3.2 04/07/2022    LABALBU 4.1 05/18/2012    CREATININE 3.4 04/07/2022    CALCIUM 8.2 04/07/2022    GFRAA 19 04/07/2022    LABGLOM 19 04/07/2022    GLUCOSE 148 04/07/2022    GLUCOSE 130 05/18/2012       XR CHEST PORTABLE   Final Result   1. The lungs are clear. There is no infiltrate or effusion. 2. Stable position of the support lines and tubes. XR CHEST PORTABLE   Final Result   1. There is no acute cardiopulmonary disease   2. Stable position of the support lines and tubes. XR CHEST PORTABLE   Final Result   Infiltrate and or atelectasis at the left lower lobe. Substantially resolved   infiltrate and or atelectasis on the right. New NG tube. CT HEAD WO CONTRAST   Final Result   No acute intracranial abnormality or hemorrhage. CT ABDOMEN PELVIS WO CONTRAST Additional Contrast? None   Final Result   1. Moderate ascites throughout abdomen and pelvis. 2.  Multiple thick walled loops of small bowel throughout the abdomen could   suggest nonspecific infectious or inflammatory enteritis. 3.  Generalized body wall edema. 4.  Small bilateral pleural effusions with adjacent atelectatic changes. XR ABDOMEN FOR NG/OG/NE TUBE PLACEMENT   Final Result   Enteric tube tip in the body of the stomach. XR CHEST PORTABLE   Final Result   Right perihilar opacity. XR CHEST PORTABLE    (Results Pending)        Resident's Assessment and Plan     Assessment:    1. Nausea/vomiting 2/2 DKA  2. HTN  3. ESRD on HD TTS  4. IDDM1 - A1c 7/7 this admission  5. Hypothyroidism - TSH 5.37, fT4 1.10 normal  6.  Acute on chronic anemia s/p 3U PRBC  7. Acute metabolic encephalopathy 2/2 hypoglycemia in setting of T1DM and ESRD  8. Hx of gastroparesis 2/2 IDDM  9. Hx of MDRO UTI  10.  Hx of C.difficile infection    Plan:  · Continue GI cocktail, pepcid, zofran bentyl  · Discontinued Reglan  · Continue 1U Lantus nightly with modified SSI per endocrine, POCT q4h  · Continue synthroid 88 mcg daily  · HD TThS per nephro  · Continue Abilify and Celexa  · Patient will need follow up with endo at discharge for cosyntropin stimulation test as outpatient  · Home going insulin regimen: Lantus 1U nightly, Lispro 0-3U TID with meal, no night time insulin    PT/OT evaluation: not indicated  DVT prophylaxis/ GI prophylaxis: heparin/pepcid  Disposition: continue current care    Percy Mejia MD, PGY-1  Attending physician: Dr. Diego Romero

## 2022-04-07 NOTE — PROGRESS NOTES
Department of Internal Medicine  Nephrology Progress Note      Events reviewed. Tolerated HD well today with UF goal of 2400 cc    SUBJECTIVE:  We are following Miss Jeanne Brewer for ESKD on HD.  Patient reports has nausea, emesis    PHYSICAL EXAM:      Vitals:    VITALS:  BP (!) 144/90   Pulse 102   Temp 97.8 °F (36.6 °C) (Oral)   Resp 16   Ht 5' 4\" (1.626 m)   Wt 138 lb (62.6 kg)   SpO2 (!) 87%   BMI 23.69 kg/m²   24HR INTAKE/OUTPUT:      Intake/Output Summary (Last 24 hours) at 4/7/2022 1148  Last data filed at 4/7/2022 0959  Gross per 24 hour   Intake 1842.26 ml   Output 2700 ml   Net -857.74 ml       Access: RIJ tunneled dialysis catheter  Constitutional: Awake, alert, NAD  HEENT:  PERRL, normocephalic, atraumatic  Respiratory:  CTA bilateral  Cardiovascular/Edema:  ST, RRR, no murmur gallop or rub  Gastrointestinal:  abd distended, c/o persistent abdominal pain  Neurologic: A&OX3 follows commands, no focal deficit  Skin:  Warm, dry, ecchymotic areas to abdomen    Other:    Scheduled Meds:   dicyclomine  10 mg Oral TID AC    insulin glargine  1 Units SubCUTAneous QAM    albumin human  25 g IntraVENous Q8H    melatonin  6 mg Oral Nightly    insulin lispro  0-3 Units SubCUTAneous TID WC    sodium chloride flush  5-40 mL IntraVENous 2 times per day    heparin flush  1 mL IntraVENous 2 times per day    dilTIAZem  90 mg Oral Q6H    mirtazapine  15 mg Oral Nightly    escitalopram  10 mg Oral Daily    ARIPiprazole  5 mg Oral Nightly    rOPINIRole  0.25 mg Oral Nightly    levothyroxine  88 mcg Oral Daily    famotidine  10 mg Oral Daily    epoetin nena-epbx  3,000 Units SubCUTAneous Once per day on Mon Wed Fri    Vitamin D  1,000 Units Per NG tube Daily     Continuous Infusions:   sodium chloride      sodium chloride 100 mL/hr at 04/07/22 0455    sodium chloride      sodium chloride      sodium chloride      dextrose       PRN Meds:.sodium chloride, metoclopramide, ondansetron, trimethobenzamide, sodium chloride, loperamide, sodium chloride, sodium chloride flush, sodium chloride, heparin flush, white petrolatum, polyethylene glycol, acetaminophen **OR** acetaminophen, glucose, dextrose, glucagon (rDNA), dextrose, albuterol, labetalol    DATA:    CBC:   Lab Results   Component Value Date    WBC 3.8 04/07/2022    RBC 2.69 04/07/2022    HGB 6.8 04/07/2022    HCT 21.8 04/07/2022    MCV 81.0 04/07/2022    MCH 25.3 04/07/2022    MCHC 31.2 04/07/2022    RDW 19.3 04/07/2022     04/07/2022    MPV 10.1 04/07/2022     CMP:    Lab Results   Component Value Date     04/07/2022    K 4.1 04/07/2022    K 3.7 03/04/2022     04/07/2022    CO2 24 04/07/2022    BUN 18 04/07/2022    CREATININE 3.4 04/07/2022    GFRAA 19 04/07/2022    LABGLOM 19 04/07/2022    GLUCOSE 148 04/07/2022    GLUCOSE 130 05/18/2012    PROT 5.8 04/07/2022    LABALBU 3.2 04/07/2022    LABALBU 4.1 05/18/2012    CALCIUM 8.2 04/07/2022    BILITOT 0.3 04/07/2022    ALKPHOS 152 04/07/2022    AST 10 04/07/2022    ALT 12 04/07/2022     Magnesium:    Lab Results   Component Value Date    MG 2.1 04/07/2022     Phosphorus:    Lab Results   Component Value Date    PHOS 3.9 04/07/2022     Radiology Review:      CT Head WO Contrast March 27, 2022   No acute intracranial abnormality or hemorrhage.         CT Abdomen Pelvis WO Contrast March 27, 2022   1.  Moderate ascites throughout abdomen and pelvis.       2.  Multiple thick walled loops of small bowel throughout the abdomen could   suggest nonspecific infectious or inflammatory enteritis.       3.  Generalized body wall edema.       4.  Small bilateral pleural effusions with adjacent atelectatic changes.             Chest X-Ray March 28, 2022   Infiltrate and or atelectasis at the left lower lobe.  Substantially resolved   infiltrate and or atelectasis on the right.  New NG tube.               BRIEF SUMMARY OF INITIAL CONSULT:    Briefly, Miss Iam Qiu is a 36year-old female with a PMH of ESRD on hemodialysis 3 days/wk, on TTS schedule at [facility] I DM, poorly controlled with recurrent admissions for DKA, HTN, gastroparesis, ascites, infective endocarditis, cardiac arrest, hypothyroidism, recent Covid, and depression who was admitted on March 27, 2022 after she was found unresponsive at the SNF where she resides. Patient was found to be profoundly hypoglycemic and EMS was called. She was intubated in ER for airway protection as she remained unresponsive. CT head showed no acute intracranial abnormality or hemorrhage, chest x-ray demonstrates right perihilar opacity concerning for aspiration pneumonia. She was ultimately admitted to MICU for further evaluation. Labs on admission were significant for sodium 135, potassium 5.0, chloride 100, bicarbonate 25, BUN 38, creatinine 4.9, proBNP >70,000. We are consulted for dialysis management. Problems resolved. · Hypoglycemia, was on D10, now hyperglycemic with +ketones (beta-hydroxybutyrate >4.5)  · Acute respiratory failure, 2/2 aspiration pneumonia? On 40% Fio2. Extubated 2/17  · Acute metabolic encephalopathy 2/2 hypoglycemia. Resolving   · Acidemia, pH 7.311, PCO2 35.0, HCO3 59.7, metabolic acidosis from DKA    IMPRESSION/RECOMMENDATIONS:      1. ESKD 3 times a week TTS via RIJ tunneled dialysis catheter. HD initiated September 2021 after failed peritoneal dialysis with persistent hyperkalemia. For dialysis three times/wk TTS while inpatient. Having dialysis today. 2. HTN, on cardizem 90 mg po qid  3. MBD of CKD, currently with hypophosphatemia. Binders d/c'd  4. Anemia of CKD, continue epoetin alpha 3,000 unit 3 times/wk. s/p transfusion   5. Vitamin D deficiency, on ergocalciferol 1000 po daily  ------------------------------------------------------  6. Type I DM, poorly controlled with frequent readmissions for DKA, on SSI, lantus decreased to 1 unit nightly  7. Aspiration pneumonia? S/p piperacillin    8.  Restless leg syndrome, on ropinirole at home  9. Depression, on escitalopram and Abilify  10. Hypothyroidism, on levothyroxine   11. History of gastroparesis, on metoclopramide at home. For EGD and pyloric dilatation with Botox injection outpatient per GI.  12. Hx recurrent C. difficile infection, on flagyl po Q 8hrs   13. Hx of MDRO UTI  14. Nutrition, diabetic diet  15. DKA  16.  Anemia of chronic disease     Plan:     · Continue HD today and three times/wk, TTS while inpatient  · Agree with fluid resuscitation and insulin treatment of DKA   · Continue  SPA 25 gm iv tid x 6 doses  · Continue epoetin alpha 3,000 units 3 times/wk  · Continue to monitor labs daily   · Continue to monitor glucose levels  · Monitor phosphorus levels   · Monitor H&H, transfuse <7, receiving transfusion today  · Discharge planning    Electronically signed by Wendi Mares MD on 4/7/2022 at 11:48 AM

## 2022-04-07 NOTE — PROGRESS NOTES
ENDOCRINOLOGY PROGRESS NOTE  Via Tele-Health Service       Date of admission: 3/27/2022  Date of service: 4/6/2022  Admitting physician: Brandi Scherer DO   Primary Care Physician: Freddie Oliveira MD  Consultant physician: Inocencio Romeo MD     Reason for the consultation:  DM type 1, labile diabetes     History of Present Illness: The history is provided from medical records, pt sedated intubated     Cherlynn Severance is a 36 y.o. old female nursing home resident with PMH of Type I DM, ESRD on PD,HTN,hypothyroidism, infective endocarditis and other listed below admitted to [facility] on 3/27/2022 because of AMS. Endocrine service was consulted for DM management.      subjective   No acute events, BG improving     Point of care glucose monitoring (Independently reviewed)   Recent Labs     04/05/22  0424 04/05/22  1302 04/05/22  1722 04/05/22  2025 04/06/22  0542 04/06/22  1119 04/06/22  1559 04/06/22 2017   GLUMET 160* 152* 192* 223* 242* 264* 185* 187*       Scheduled Meds:   metoclopramide  5 mg Oral 4x Daily AC & HS    dicyclomine  10 mg Oral TID AC    insulin glargine  1 Units SubCUTAneous QAM    albumin human  25 g IntraVENous Q8H    melatonin  6 mg Oral Nightly    insulin lispro  0-3 Units SubCUTAneous TID WC    sodium chloride flush  5-40 mL IntraVENous 2 times per day    heparin flush  1 mL IntraVENous 2 times per day    dilTIAZem  90 mg Oral Q6H    mirtazapine  15 mg Oral Nightly    escitalopram  10 mg Oral Daily    ARIPiprazole  5 mg Oral Nightly    rOPINIRole  0.25 mg Oral Nightly    levothyroxine  88 mcg Oral Daily    famotidine  10 mg Oral Daily    epoetin nena-epbx  3,000 Units SubCUTAneous Once per day on Mon Wed Fri    Vitamin D  1,000 Units Per NG tube Daily     PRN Meds:   ondansetron, 4 mg, Q4H PRN  trimethobenzamide, 200 mg, Q6H PRN  sodium chloride, , PRN  loperamide, 2 mg, 4x Daily PRN  sodium chloride, , PRN  sodium chloride flush, 5-40 mL, PRN  sodium chloride, , PRN  heparin flush, 1 mL, PRN  white petrolatum, , BID PRN  polyethylene glycol, 17 g, Daily PRN  acetaminophen, 650 mg, Q6H PRN   Or  acetaminophen, 650 mg, Q6H PRN  glucose, 15 g, PRN  dextrose, 12.5 g, PRN  glucagon (rDNA), 1 mg, PRN  dextrose, 100 mL/hr, PRN  albuterol, 2.5 mg, Q6H PRN  labetalol, 5 mg, Q6H PRN      Continuous Infusions:   sodium chloride 100 mL/hr at 04/06/22 1653    sodium chloride      sodium chloride      sodium chloride      dextrose         Review of Systems  Non-obtainable     OBJECTIVE    /78   Pulse 94   Temp 98 °F (36.7 °C) (Oral)   Resp 16   Ht 5' 4\" (1.626 m)   Wt 139 lb 5.3 oz (63.2 kg)   SpO2 95%   BMI 23.92 kg/m²   No intake or output data in the 24 hours ending 04/06/22 2050    Physical examination:  Due to this being a TeleHealth encounter, evaluation of the following organ systems is limited: Vitals/Constitutional/EENT/Resp/CV/GI//MS/Neuro/Skin/Heme-Lymph-Imm. Modified physical exam through Telemedicine camera    General: lethrgic  Neck: no obvious neck mass. No obvious neck deformity     CVS: no distress   Chest: no distress. Chest is moving with respiration    Extremities:  no visible tremor  Skin: No visible rashes as seen from camera   Musculoskeletal: no visible deformity      Review of Laboratory Data:  I personally reviewed the following labs:   Recent Labs     04/04/22  0540 04/04/22  1125 04/04/22  2150 04/05/22  0330 04/06/22  0519   WBC 5.2  --   --  5.6 4.6   RBC 2.95*  --   --  2.96* 3.00*   HGB 7.7*   < > 7.8* 7.7*  7.7* 7.7*   HCT 24.5*   < > 24.3* 24.2*  24.2* 25.8*   MCV 83.1  --   --  81.8 86.0   MCH 26.1  --   --  26.0 25.7*   MCHC 31.4*  --   --  31.8* 29.8*   RDW 18.1*  --   --  18.4* 18.6*     --   --  211 214   MPV 11.0  --   --  10.1 10.0    < > = values in this interval not displayed.      Recent Labs     04/04/22  0634 04/04/22  1750 04/05/22  0330 04/05/22  1730 04/06/22  0519 04/06/22  1230 04/06/22  1715      < > 138   < > 140 137 138   K 4.4   < > 5.0   < > 4.2 4.4 4.3      < > 102   < > 101 97* 101   CO2 27   < > 25   < > 20* 16* 20*   BUN 22*   < > 26*   < > 13 17 17   CREATININE 2.8*   < > 3.7*   < > 2.4* 2.8* 3.0*   GLUCOSE 25*   < > 243*   < > 234* 276* 175*   CALCIUM 8.2*   < > 8.3*   < > 8.3* 8.6 8.2*   PROT 6.3*  --  6.1*  --  5.9*  --   --    LABALBU 3.3*  --  3.1*  --  2.8*  --   --    BILITOT 0.4  --  0.3  --  0.3  --   --    ALKPHOS 138*  --  152*  --  167*  --   --    AST 33*  --  25  --  20  --   --    ALT 21  --  19  --  17  --   --     < > = values in this interval not displayed. Beta-Hydroxybutyrate   Date Value Ref Range Status   04/06/2022 >4.50 (H) 0.02 - 0.27 mmol/L Final   03/29/2022 >4.50 (H) 0.02 - 0.27 mmol/L Final   03/27/2022 0.10 0.02 - 0.27 mmol/L Final     Lab Results   Component Value Date    LABA1C 7.7 03/02/2022    LABA1C 8.7 12/08/2021    LABA1C 10.2 11/23/2021     Lab Results   Component Value Date/Time    TSH 5.370 (H) 03/28/2022 01:56 AM    T4FREE 1.10 03/28/2022 01:56 AM    H0SFMGF 8.6 11/05/2015 02:20 AM    FT3 1.3 (L) 10/31/2020 10:43 AM    T8XJJPC 36.73 (L) 07/20/2020 02:00 PM     Lab Results   Component Value Date    LABA1C 7.7 03/02/2022    GLUCOSE 175 04/06/2022    GLUCOSE 130 05/18/2012    MALBCR 2949.3 01/15/2020    LABMICR 1740.1 01/15/2020    LABCREA 59 01/15/2020    LABCREA 61 01/15/2020     Lab Results   Component Value Date    TRIG 82 01/14/2020    HDL 33 01/14/2020    LDLCALC 46 01/14/2020    CHOL 95 01/14/2020       Blood culture   Lab Results   Component Value Date    BC 5 Days no growth 03/28/2022    BC 5 Days no growth 03/27/2022       Radiology:  XR CHEST PORTABLE   Final Result   1. The lungs are clear. There is no infiltrate or effusion. 2. Stable position of the support lines and tubes. XR CHEST PORTABLE   Final Result   1. There is no acute cardiopulmonary disease   2. Stable position of the support lines and tubes.          XR CHEST PORTABLE   Final Result   Infiltrate and or atelectasis at the left lower lobe. Substantially resolved   infiltrate and or atelectasis on the right. New NG tube. CT HEAD WO CONTRAST   Final Result   No acute intracranial abnormality or hemorrhage. CT ABDOMEN PELVIS WO CONTRAST Additional Contrast? None   Final Result   1. Moderate ascites throughout abdomen and pelvis. 2.  Multiple thick walled loops of small bowel throughout the abdomen could   suggest nonspecific infectious or inflammatory enteritis. 3.  Generalized body wall edema. 4.  Small bilateral pleural effusions with adjacent atelectatic changes. XR ABDOMEN FOR NG/OG/NE TUBE PLACEMENT   Final Result   Enteric tube tip in the body of the stomach. XR CHEST PORTABLE   Final Result   Right perihilar opacity. XR CHEST PORTABLE    (Results Pending)       Medical Records/Labs/Images review:   I personally reviewed and summarized previous records   All labs and imaging were reviewed independently     [address] Nikolay Maldonado, a 36 y.o.-old female seen today for inpatient diabetes management     Diabetes Mellitus type I    · Extremely insulin sensitive. Pt is type 1 diabetic and needs long acting insulin   · Pt didn't receive lantus last night, AM labs showed that she is in DKA   · Will try to treat DKA in the floor with sq insulin   · we recommend the following sq insulin regimen   · Lantus ONE units daily at night   · Modified Low dose sliding scale (1u:100>200) with meals  · Continue following BG and adjust insulin regimen    primary Hypothyroidism  · Levothyroxine was adjusted during this admission to 88 mcg daily      ESRD  · Pt at risk for hypoglycemia  · On dialysis, nephrology following    Thank you for allowing us to participate in the care of this patient. Please do not hesitate to contact us with any additional questions.      Riki Sue MD  Endocrinologist, [facility] and Endocrinology [address] [facility], [address] [place] [address],[id number]   Phone: [phone]  Fax: [phone]  ----------------------------  An electronic signature was used to authenticate this note.  Scott Constantino MD on 4/6/2022 at 8:50 PM

## 2022-04-07 NOTE — PROGRESS NOTES
Comprehensive Nutrition Assessment    Type and Reason for Visit:  Reassess    Nutrition Recommendations/Plan: Continue current diet order. Recommend and start Ensure clear ONS BID to optimize intake and monitor for nutrition progression/tolerance. Nutrition Assessment:  Pt. is at nurtional risk d/t ongoing poor po intake. Pt. admit w/ acute encephalopathy & AMS 2/2 hypoglycemia (resolved); pt extubated 3/30; noted ESRD (on HD); hx of DM, Ketoacidosis 2/2 DM, drug abuse, non-compliance w/ meds, severe-pro nina malnutr. Noted pt. is w/ poor/limited intake and not tolerating/refusing diet 2/2 abd pain w/ N/V/D likely 2/2 DKA. Will modify ONS for clear liquids to optimize intake. Malnutrition Assessment:  Malnutrition Status: At risk for malnutrition (Comment)    Context:  Chronic Illness     Findings of the 6 clinical characteristics of malnutrition:  Energy Intake:  Mild decrease in energy intake (Comment)  Weight Loss:  Unable to assess (d/t wt flucc 2/2 ESRD + HD; measured wt hx ranging from 129-169# within the past yr)     Body Fat Loss:  Unable to assess     Muscle Mass Loss:  Unable to assess    Fluid Accumulation:  No significant fluid accumulation     Strength:  Not Performed    Estimated Daily Nutrient Needs:  Energy (kcal):  1437-2038; Weight Used for Energy Requirements:  Current     Protein (g):  1.5-1.8g/kgxCBW=95-115g; Weight Used for Protein Requirements:  Current (HD)        Fluid (ml/day):  per renal management; Method Used for Fluid Requirements:  Standard Renal      Nutrition Related Findings:  A&Ox4; active BS; trace edema; -I/O 2.4L, tender abd, N/V/D. Wounds:  None       Current Nutrition Therapies:    ADULT ORAL NUTRITION SUPPLEMENT; Lunch, Breakfast; Renal Oral Supplement  ADULT DIET;  Clear Liquid; 4 carb choices (60 gm/meal)    Anthropometric Measures:  · Height: 5' 4\" (162.6 cm)  · Current Body Weight: 138 lb (62.6 kg) (4/07-BS)   · Admission Body Weight: 138 lb 3.7 oz (62.7 kg) (3/28-BS)    · Usual Body Weight:  (note w/in 1yr wts range from 129#-169# possibly d/t fluid shifts 2/2 ESRD + HD)     · Ideal Body Weight: 120 lbs; % Ideal Body Weight 117.5 %   · BMI: 23.7   · BMI Categories: Normal Weight (BMI 18.5-24. 9)       Nutrition Diagnosis:   · Inadequate oral intake related to altered GI function as evidenced by NPO or clear liquid status due to medical condition,vomiting,nausea,diarrhea,intake 0-25%      Nutrition Interventions:   Food and/or Nutrient Delivery:  Continue NPO,Modify Oral Nutrition Supplement (Ensure clear BID)  Nutrition Education/Counseling:  Education not indicated   Coordination of Nutrition Care:  Continue to monitor while inpatient    Goals:  Nutrition progression       Nutrition Monitoring and Evaluation:   Behavioral-Environmental Outcomes:  None Identified   Food/Nutrient Intake Outcomes:  Diet Advancement/Tolerance,Supplement Intake  Physical Signs/Symptoms Outcomes:  Biochemical Data,Nutrition Focused Physical Findings,Skin,Weight,Chewing or Swallowing,GI Status,Fluid Status or Edema     Discharge Planning:     Too soon to determine     Electronically signed by Tara Stoll RD on 4/7/22 at 11:34 AM EDT    Contact: ext [phone]

## 2022-04-07 NOTE — FLOWSHEET NOTE
04/07/22 0959   Vital Signs   BP (!) 159/86   Temp 98 °F (36.7 °C)   Pulse 97   Post-Hemodialysis Assessment   Post-Treatment Procedures Blood returned;Catheter capped, clamped and heparinized x 2 ports   Machine Disinfection Process Exterior Machine Disinfection   Dialyzer Clearance Lightly streaked   Duration of Treatment (minutes) 210 minutes   Hemodialysis Intake (ml) 300 ml   Hemodialysis Output (ml) 2700 ml   NET Removed (ml) 2400 ml   Tolerated Treatment Good   Patient Response to Treatment pt tx ended blood rinsed back , pt tolerated tx well,pt transported back to her room via bed

## 2022-04-07 NOTE — PROGRESS NOTES
[facility]  Internal Medicine Residency Program  Progress Note - House Team     Patient:  Hedy Holguin 36 y.o. female MRN: [id number]     Date of Service: 4/7/2022     CC: Altered Mental Status     Subjective   Brief Summary:    Ms. Mary Cordon is a 36year old female who was found obtunded and severely hypoglycemic at her nursing home on 3/27. She has a past medical history of ESRD on hemodialysis, poorly controlled TIDM, HTN, hypothyroidism, and depression. She was intubated for airway protection and admitted to the [place] [address] ICU admission and treatment with antibiotics and steroids, patient's status improved significantly. Adrenal Insufficiency workup negative. She is currently being managed for brittle type 1 diabetes. Hospital Day: 12    Overnight Events:   Over the past several days, a communication error between nursing and physicians led to patient not receiving her 1 unit lantus. Per nursing, there was a fear that patient receiving insulin while not eating would make her hypoglycemic. House team was not informed of the decision to withhold insulin until yesterday. Patient had high blood glucose, elevated betahydroxybutyrate, and an anion gap metabolic acidosis consistent with DKA. She received IV fluids and her lantus dose yesterday. This AM     Patient was seen and examined this morning at dialysis.  She states she feels much better. Her nausea, diarrhea, and abdominal pain are significantly improved. She is still endorsing nausea while eating and reports emesis when attempting to eat. Patient denies fevers, chills, diarrhea, chest pain, and shortness of breath.  Patient's Hb dropped below 7 this AM, following fluid resuscitation yesterday. This was replaced with one unit RBC today.      Objective     Physical Exam:  · Vitals: BP (!) 150/90   Pulse 102   Temp 98.1 °F (36.7 °C) (Oral)   Resp 16   Ht 5' 4\" (1.626 m)   Wt 138 lb (62.6 kg)   SpO2 98% BMI 23.69 kg/m²     · I & O - 24hr: I/O this shift:  · In: 300   · Out: 2700    · General Appearance: alert, appears stated age, cooperative and no distress  · Lung: clear to auscultation bilaterally  · Heart: regular rate and rhythm, S1, S2 normal, no murmur, click, rub or gallop  · Abdomen: soft, non-tender; bowel sounds normal; no masses,  no organomegaly  · Neurologic: Mental status: Alert, oriented, thought content appropriate  Subject  Pertinent Labs & Imaging Studies   jorge alberto  CBC:   Lab Results   Component Value Date    WBC 3.8 04/07/2022    RBC 2.69 04/07/2022    HGB 6.8 04/07/2022    HCT 21.8 04/07/2022    MCV 81.0 04/07/2022    MCH 25.3 04/07/2022    MCHC 31.2 04/07/2022    RDW 19.3 04/07/2022     04/07/2022    MPV 10.1 04/07/2022     CMP:    Lab Results   Component Value Date     04/07/2022    K 4.1 04/07/2022    K 3.7 03/04/2022     04/07/2022    CO2 24 04/07/2022    BUN 18 04/07/2022    CREATININE 3.4 04/07/2022    GFRAA 19 04/07/2022    LABGLOM 19 04/07/2022    GLUCOSE 148 04/07/2022    GLUCOSE 130 05/18/2012    PROT 5.8 04/07/2022    LABALBU 3.2 04/07/2022    LABALBU 4.1 05/18/2012    CALCIUM 8.2 04/07/2022    BILITOT 0.3 04/07/2022    ALKPHOS 152 04/07/2022    AST 10 04/07/2022    ALT 12 04/07/2022       XR CHEST PORTABLE   Final Result   1. The lungs are clear. There is no infiltrate or effusion. 2. Stable position of the support lines and tubes. XR CHEST PORTABLE   Final Result   1. There is no acute cardiopulmonary disease   2. Stable position of the support lines and tubes. XR CHEST PORTABLE   Final Result   Infiltrate and or atelectasis at the left lower lobe. Substantially resolved   infiltrate and or atelectasis on the right. New NG tube. CT HEAD WO CONTRAST   Final Result   No acute intracranial abnormality or hemorrhage. CT ABDOMEN PELVIS WO CONTRAST Additional Contrast? None   Final Result   1.   Moderate ascites throughout abdomen and pelvis. 2.  Multiple thick walled loops of small bowel throughout the abdomen could   suggest nonspecific infectious or inflammatory enteritis. 3.  Generalized body wall edema. 4.  Small bilateral pleural effusions with adjacent atelectatic changes. XR ABDOMEN FOR NG/OG/NE TUBE PLACEMENT   Final Result   Enteric tube tip in the body of the stomach. XR CHEST PORTABLE   Final Result   Right perihilar opacity. XR CHEST PORTABLE    (Results Pending)        Resident's Assessment and Plan     Assessment and Plan:     1. TIDM  - Endo following.   - highly sensitive to insulin  - pt did not receive insulin for past several days due to communication error (in subjective). - developed HAGMA 2/2 DKA  - received 1 unit lantus and IV fluids. - anion gap closing and patient's blood glucose is improved. - pts symptoms of nausea, diarrhea, abdominal pain are improving.      2. Abdominal Pain  - diffuse abdominal pain, nausea and vomiting while eating. improving from yesterday. - pt still not able to tolerate diet. Encouraged importance of eating once she is feeling better. - abdomen soft and nontender to palpation, no rebound or guarding.  - PRN GI cocktail, and clear liquid diet  - continue bentyl 10mg BID  - Discontinue Reglan (patient denies it)      3. End Stage Renal Disease  - Continue dialysis Tues/Thurs/Saturday     4. Hypothyroidism  - continue levothyroxine 88mg daily.      5. Depression  - continue Abilify and Celexa     6.  Hypertension  - continue cardizem         PT/OT evaluation: not indicated  DVT prophylaxis/ GI prophylaxis: Heparin/Pepcid  Disposition: continue current care    Marine Haines, MS4  Attending physician: Dr. Ken Nagel

## 2022-04-07 NOTE — PROGRESS NOTES
[facility]  Internal Medicine Clinic    Attending Physician Statement:  Efren Wells D.O. I have discussed the case, including pertinent history and exam findings with the resident. I agree with the assessment, plan and orders as documented by the resident. Patient here for routine follow up of medical problems. IDDM2: multiple episodes of hypoglycemia as outpatient; endocrinology evaluating patient at this time; continue treatment per their recs; patient no longer in DKA; IVF dcd; currently prescribed Glargine 1 U BID per endocrinology; Abdominal pain, N/V, and diarrhea resolved; patinet encouraged to eat; precert reinitiated;    End-stage renal disease on hemodialysis: Nephrology consulted and for dialysis on a TTS schedule, care per their recommendations,     Remainder of medical problems as per resident note.

## 2022-04-08 LAB
ANION GAP SERPL CALCULATED.3IONS-SCNC: 12 MMOL/L (ref 7–16)
BUN BLDV-MCNC: 7 MG/DL (ref 6–20)
CALCIUM SERPL-MCNC: 9 MG/DL (ref 8.6–10.2)
CHLORIDE BLD-SCNC: 102 MMOL/L (ref 98–107)
CO2: 25 MMOL/L (ref 22–29)
CREAT SERPL-MCNC: 2.4 MG/DL (ref 0.5–1)
GFR AFRICAN AMERICAN: 29
GFR NON-AFRICAN AMERICAN: 29 ML/MIN/1.73
GLUCOSE BLD-MCNC: 113 MG/DL (ref 74–99)
METER GLUCOSE: 109 MG/DL (ref 74–99)
METER GLUCOSE: 114 MG/DL (ref 74–99)
METER GLUCOSE: 133 MG/DL (ref 74–99)
METER GLUCOSE: 65 MG/DL (ref 74–99)
METER GLUCOSE: 79 MG/DL (ref 74–99)
POTASSIUM SERPL-SCNC: 3.8 MMOL/L (ref 3.5–5)
SODIUM BLD-SCNC: 139 MMOL/L (ref 132–146)

## 2022-04-08 PROCEDURE — 99232 SBSQ HOSP IP/OBS MODERATE 35: CPT | Performed by: INTERNAL MEDICINE

## 2022-04-08 PROCEDURE — 82962 GLUCOSE BLOOD TEST: CPT

## 2022-04-08 PROCEDURE — S5553 INSULIN LONG ACTING 5 U: HCPCS | Performed by: INTERNAL MEDICINE

## 2022-04-08 PROCEDURE — 6370000000 HC RX 637 (ALT 250 FOR IP): Performed by: INTERNAL MEDICINE

## 2022-04-08 PROCEDURE — 6360000002 HC RX W HCPCS: Performed by: INTERNAL MEDICINE

## 2022-04-08 PROCEDURE — 2700000000 HC OXYGEN THERAPY PER DAY

## 2022-04-08 PROCEDURE — 36415 COLL VENOUS BLD VENIPUNCTURE: CPT

## 2022-04-08 PROCEDURE — 6370000000 HC RX 637 (ALT 250 FOR IP): Performed by: STUDENT IN AN ORGANIZED HEALTH CARE EDUCATION/TRAINING PROGRAM

## 2022-04-08 PROCEDURE — 80048 BASIC METABOLIC PNL TOTAL CA: CPT

## 2022-04-08 PROCEDURE — 2580000003 HC RX 258: Performed by: INTERNAL MEDICINE

## 2022-04-08 PROCEDURE — 6360000002 HC RX W HCPCS: Performed by: NURSE PRACTITIONER

## 2022-04-08 PROCEDURE — 1200000000 HC SEMI PRIVATE

## 2022-04-08 PROCEDURE — 6370000000 HC RX 637 (ALT 250 FOR IP): Performed by: NURSE PRACTITIONER

## 2022-04-08 PROCEDURE — P9047 ALBUMIN (HUMAN), 25%, 50ML: HCPCS | Performed by: INTERNAL MEDICINE

## 2022-04-08 PROCEDURE — 2500000003 HC RX 250 WO HCPCS: Performed by: INTERNAL MEDICINE

## 2022-04-08 RX ADMIN — DICYCLOMINE HYDROCHLORIDE 10 MG: 10 CAPSULE ORAL at 05:03

## 2022-04-08 RX ADMIN — Medication 12.5 G: at 05:42

## 2022-04-08 RX ADMIN — MIRTAZAPINE 15 MG: 15 TABLET, FILM COATED ORAL at 20:40

## 2022-04-08 RX ADMIN — DILTIAZEM HYDROCHLORIDE 90 MG: 30 TABLET, FILM COATED ORAL at 03:17

## 2022-04-08 RX ADMIN — ARIPIPRAZOLE 5 MG: 5 TABLET ORAL at 20:40

## 2022-04-08 RX ADMIN — SODIUM CHLORIDE, PRESERVATIVE FREE 100 UNITS: 5 INJECTION INTRAVENOUS at 09:39

## 2022-04-08 RX ADMIN — ROPINIROLE 0.25 MG: 0.25 TABLET, FILM COATED ORAL at 20:40

## 2022-04-08 RX ADMIN — Medication 1000 UNITS: at 12:06

## 2022-04-08 RX ADMIN — EPOETIN ALFA-EPBX 3000 UNITS: 3000 INJECTION, SOLUTION INTRAVENOUS; SUBCUTANEOUS at 09:39

## 2022-04-08 RX ADMIN — ALBUMIN (HUMAN) 25 G: 0.25 INJECTION, SOLUTION INTRAVENOUS at 01:07

## 2022-04-08 RX ADMIN — ESCITALOPRAM 10 MG: 10 TABLET, FILM COATED ORAL at 09:39

## 2022-04-08 RX ADMIN — LOPERAMIDE HYDROCHLORIDE 2 MG: 2 CAPSULE ORAL at 20:40

## 2022-04-08 RX ADMIN — FAMOTIDINE 10 MG: 20 TABLET ORAL at 09:39

## 2022-04-08 RX ADMIN — SODIUM CHLORIDE, PRESERVATIVE FREE 100 UNITS: 5 INJECTION INTRAVENOUS at 20:41

## 2022-04-08 RX ADMIN — DILTIAZEM HYDROCHLORIDE 90 MG: 30 TABLET, FILM COATED ORAL at 15:37

## 2022-04-08 RX ADMIN — Medication 10 ML: at 20:41

## 2022-04-08 RX ADMIN — DICYCLOMINE HYDROCHLORIDE 10 MG: 10 CAPSULE ORAL at 12:43

## 2022-04-08 RX ADMIN — DICYCLOMINE HYDROCHLORIDE 10 MG: 10 CAPSULE ORAL at 15:37

## 2022-04-08 RX ADMIN — Medication 10 ML: at 09:40

## 2022-04-08 RX ADMIN — LEVOTHYROXINE SODIUM 88 MCG: 0.09 TABLET ORAL at 05:03

## 2022-04-08 RX ADMIN — ONDANSETRON 4 MG: 2 INJECTION INTRAMUSCULAR; INTRAVENOUS at 20:41

## 2022-04-08 RX ADMIN — ONDANSETRON 4 MG: 2 INJECTION INTRAMUSCULAR; INTRAVENOUS at 09:39

## 2022-04-08 RX ADMIN — INSULIN GLARGINE-YFGN 1 UNITS: 100 INJECTION, SOLUTION SUBCUTANEOUS at 12:06

## 2022-04-08 RX ADMIN — Medication 6 MG: at 20:40

## 2022-04-08 RX ADMIN — DILTIAZEM HYDROCHLORIDE 90 MG: 30 TABLET, FILM COATED ORAL at 20:40

## 2022-04-08 RX ADMIN — DILTIAZEM HYDROCHLORIDE 90 MG: 30 TABLET, FILM COATED ORAL at 09:38

## 2022-04-08 RX ADMIN — SODIUM CHLORIDE, PRESERVATIVE FREE 10 ML: 5 INJECTION INTRAVENOUS at 01:07

## 2022-04-08 ASSESSMENT — PAIN SCALES - GENERAL
PAINLEVEL_OUTOF10: 5
PAINLEVEL_OUTOF10: 0

## 2022-04-08 NOTE — PROGRESS NOTES
Department of Internal Medicine  Nephrology Progress Note      Events reviewed. SUBJECTIVE:  We are following Miss Cheryle Coyne for ESKD on HD. Patient reports continued nausea.     PHYSICAL EXAM:      Vitals:    VITALS:  /78   Pulse 102   Temp 98.6 °F (37 °C) (Oral)   Resp 16   Ht 5' 4\" (1.626 m)   Wt 138 lb (62.6 kg)   SpO2 92%   BMI 23.69 kg/m²   24HR INTAKE/OUTPUT:      Intake/Output Summary (Last 24 hours) at 4/8/2022 0834  Last data filed at 4/8/2022 0459  Gross per 24 hour   Intake 1007.67 ml   Output 2700 ml   Net -1692.33 ml       Access: RIJ tunneled dialysis catheter  Constitutional: Awake, alert, NAD  HEENT:  PERRL, normocephalic, atraumatic  Respiratory:  CTA bilateral  Cardiovascular/Edema:  ST, RRR, no murmur gallop or rub  Gastrointestinal:  abd distended, c/o persistent abdominal pain  Neurologic: A&OX3 follows commands, no focal deficit  Skin:  Warm, dry, ecchymotic areas to abdomen    Other:    Scheduled Meds:   dicyclomine  10 mg Oral TID AC    insulin glargine  1 Units SubCUTAneous QAM    melatonin  6 mg Oral Nightly    insulin lispro  0-3 Units SubCUTAneous TID WC    sodium chloride flush  5-40 mL IntraVENous 2 times per day    heparin flush  1 mL IntraVENous 2 times per day    dilTIAZem  90 mg Oral Q6H    mirtazapine  15 mg Oral Nightly    escitalopram  10 mg Oral Daily    ARIPiprazole  5 mg Oral Nightly    rOPINIRole  0.25 mg Oral Nightly    levothyroxine  88 mcg Oral Daily    famotidine  10 mg Oral Daily    epoetin nena-epbx  3,000 Units SubCUTAneous Once per day on Mon Wed Fri    Vitamin D  1,000 Units Per NG tube Daily     Continuous Infusions:   sodium chloride      sodium chloride      sodium chloride      sodium chloride      sodium chloride      dextrose       PRN Meds:.sodium chloride, metoclopramide, sodium chloride, ondansetron, trimethobenzamide, sodium chloride, loperamide, sodium chloride, sodium chloride flush, sodium chloride, heparin flush, white petrolatum, polyethylene glycol, acetaminophen **OR** acetaminophen, glucose, dextrose, glucagon (rDNA), dextrose, albuterol, labetalol    DATA:    CBC:   Lab Results   Component Value Date    WBC 3.8 04/07/2022    RBC 2.69 04/07/2022    HGB 10.2 04/07/2022    HCT 32.9 04/07/2022    MCV 81.0 04/07/2022    MCH 25.3 04/07/2022    MCHC 31.2 04/07/2022    RDW 19.3 04/07/2022     04/07/2022    MPV 10.1 04/07/2022     CMP:    Lab Results   Component Value Date     04/07/2022    K 4.1 04/07/2022    K 3.7 03/04/2022     04/07/2022    CO2 23 04/07/2022    BUN 5 04/07/2022    CREATININE 1.6 04/07/2022    GFRAA 46 04/07/2022    LABGLOM 46 04/07/2022    GLUCOSE 103 04/07/2022    GLUCOSE 130 05/18/2012    PROT 5.8 04/07/2022    LABALBU 3.2 04/07/2022    LABALBU 4.1 05/18/2012    CALCIUM 8.4 04/07/2022    BILITOT 0.3 04/07/2022    ALKPHOS 152 04/07/2022    AST 10 04/07/2022    ALT 12 04/07/2022     Magnesium:    Lab Results   Component Value Date    MG 2.1 04/07/2022     Phosphorus:    Lab Results   Component Value Date    PHOS 3.9 04/07/2022     Radiology Review:      CT Head WO Contrast March 27, 2022   No acute intracranial abnormality or hemorrhage.         CT Abdomen Pelvis WO Contrast March 27, 2022   1.  Moderate ascites throughout abdomen and pelvis.       2.  Multiple thick walled loops of small bowel throughout the abdomen could   suggest nonspecific infectious or inflammatory enteritis.       3.  Generalized body wall edema.       4.  Small bilateral pleural effusions with adjacent atelectatic changes.             Chest X-Ray March 28, 2022   Infiltrate and or atelectasis at the left lower lobe.  Substantially resolved   infiltrate and or atelectasis on the right.  New NG tube.               BRIEF SUMMARY OF INITIAL CONSULT:    Briefly, Miss Matthew Quiroga is a 36year-old female with a PMH of ESRD on hemodialysis 3 days/wk, on TTS schedule at [facility] I DM, poorly controlled with recurrent admissions for DKA, HTN, gastroparesis, ascites, infective endocarditis, cardiac arrest, hypothyroidism, recent Covid, and depression who was admitted on March 27, 2022 after she was found unresponsive at the SNF where she resides. Patient was found to be profoundly hypoglycemic and EMS was called. She was intubated in ER for airway protection as she remained unresponsive. CT head showed no acute intracranial abnormality or hemorrhage, chest x-ray demonstrates right perihilar opacity concerning for aspiration pneumonia. She was ultimately admitted to MICU for further evaluation. Labs on admission were significant for sodium 135, potassium 5.0, chloride 100, bicarbonate 25, BUN 38, creatinine 4.9, proBNP >70,000. We are consulted for dialysis management. Problems resolved. · Hypoglycemia, was on D10, now hyperglycemic with +ketones (beta-hydroxybutyrate >4.5)  · Acute respiratory failure, 2/2 aspiration pneumonia? On 40% Fio2. Extubated 4/78  · Acute metabolic encephalopathy 2/2 hypoglycemia. Resolving   · Acidemia, pH 7.311, PCO2 35.0, HCO3 08.4, metabolic acidosis from DKA    IMPRESSION/RECOMMENDATIONS:      1. ESKD 3 times a week TTS via RIJ tunneled dialysis catheter. HD initiated September 2021 after failed peritoneal dialysis with persistent hyperkalemia. For dialysis three times/wk TTS while inpatient. Having dialysis today. 2. HTN, on cardizem 90 mg po qid  3. MBD of CKD, currently with hypophosphatemia. Binders d/c'd  4. Anemia of CKD, continue epoetin alpha 3,000 unit 3 times/wk. s/p transfusion   5. Vitamin D deficiency, on ergocalciferol 1000 po daily  ------------------------------------------------------  6. Type I DM, poorly controlled with frequent readmissions for DKA, on SSI, lantus decreased to 1 unit nightly  7. Aspiration pneumonia? S/p piperacillin    8. Restless leg syndrome, on ropinirole at home  9. Depression, on escitalopram and Abilify  10.  Hypothyroidism, on levothyroxine   11. History of gastroparesis, on metoclopramide at home. For EGD and pyloric dilatation with Botox injection outpatient per GI.  12. Hx recurrent C. difficile infection, on flagyl po Q 8hrs   13. Hx of MDRO UTI  14. Nutrition, diabetic diet  15. DKA  16.  Anemia of chronic disease     Plan:     · Continue HD three times/wk, TTS while inpatient  · Continue  SPA 25 gm iv tid x 6 doses  · Continue epoetin alpha 3,000 units 3 times/wk  · Continue to monitor labs daily   · Continue to monitor glucose levels  · Monitor phosphorus levels   · Monitor H&H, transfuse <7, receiving transfusion today  · Discharge planning    Electronically signed by HEBER Brown CNP on 4/8/2022 at 8:34 AM

## 2022-04-08 NOTE — PROGRESS NOTES
ENDOCRINOLOGY PROGRESS NOTE  Via Tele-Health Service       Date of admission: 3/27/2022  Date of service: 4/8/2022  Admitting physician: Aakash Conner DO   Primary Care Physician: Piter Casillas MD  Consultant physician: Rio Calixto MD     Reason for the consultation:  DM type 1, labile diabetes     History of Present Illness: The history is provided from medical records, pt sedated intubated     Stephanie Self is a 36 y.o. old female nursing home resident with PMH of Type I DM, ESRD on PD,HTN,hypothyroidism, infective endocarditis and other listed below admitted to [facility] on 3/27/2022 because of AMS. Endocrine service was consulted for DM management.      subjective   No acute events, BG under good control      Point of care glucose monitoring (Independently reviewed)   Recent Labs     04/06/22  2017 04/07/22  0527 04/07/22  1201 04/07/22  1811 04/07/22  2146 04/08/22  0536 04/08/22  0744 04/08/22  1154   GLUMET 187* 112* 98 97 106* 65* 109* 114*     Scheduled Meds:   dicyclomine  10 mg Oral TID AC    insulin glargine  1 Units SubCUTAneous QAM    melatonin  6 mg Oral Nightly    insulin lispro  0-3 Units SubCUTAneous TID WC    sodium chloride flush  5-40 mL IntraVENous 2 times per day    heparin flush  1 mL IntraVENous 2 times per day    dilTIAZem  90 mg Oral Q6H    mirtazapine  15 mg Oral Nightly    escitalopram  10 mg Oral Daily    ARIPiprazole  5 mg Oral Nightly    rOPINIRole  0.25 mg Oral Nightly    levothyroxine  88 mcg Oral Daily    famotidine  10 mg Oral Daily    epoetin nena-epbx  3,000 Units SubCUTAneous Once per day on Mon Wed Fri    Vitamin D  1,000 Units Per NG tube Daily     PRN Meds:   sodium chloride, , PRN  metoclopramide, 5 mg, 4x Daily PRN  sodium chloride, , PRN  ondansetron, 4 mg, Q4H PRN  trimethobenzamide, 200 mg, Q6H PRN  sodium chloride, , PRN  loperamide, 2 mg, 4x Daily PRN  sodium chloride, , PRN  sodium chloride flush, 5-40 mL, PRN  sodium chloride, , PRN  heparin flush, 1 mL, PRN  white petrolatum, , BID PRN  polyethylene glycol, 17 g, Daily PRN  acetaminophen, 650 mg, Q6H PRN   Or  acetaminophen, 650 mg, Q6H PRN  glucose, 15 g, PRN  dextrose, 12.5 g, PRN  glucagon (rDNA), 1 mg, PRN  dextrose, 100 mL/hr, PRN  albuterol, 2.5 mg, Q6H PRN  labetalol, 5 mg, Q6H PRN      Continuous Infusions:   sodium chloride      sodium chloride      sodium chloride      sodium chloride      sodium chloride      dextrose         Review of Systems  Non-obtainable     OBJECTIVE    BP (!) 164/98   Pulse 98   Temp 98 °F (36.7 °C) (Oral)   Resp 16   Ht 5' 4\" (1.626 m)   Wt 138 lb (62.6 kg)   SpO2 96%   BMI 23.69 kg/m²     Intake/Output Summary (Last 24 hours) at 4/8/2022 1544  Last data filed at 4/8/2022 0459  Gross per 24 hour   Intake 707.67 ml   Output --   Net 707.67 ml       Physical examination:  Due to this being a TeleHealth encounter, evaluation of the following organ systems is limited: Vitals/Constitutional/EENT/Resp/CV/GI//MS/Neuro/Skin/Heme-Lymph-Imm. Modified physical exam through Telemedicine camera    General: lethrgic  Neck: no obvious neck mass. No obvious neck deformity     CVS: no distress   Chest: no distress.  Chest is moving with respiration    Extremities:  no visible tremor  Skin: No visible rashes as seen from camera   Musculoskeletal: no visible deformity      Review of Laboratory Data:  I personally reviewed the following labs:   Recent Labs     04/06/22 0519 04/07/22  0602 04/07/22  1744   WBC 4.6 3.8*  --    RBC 3.00* 2.69*  --    HGB 7.7* 6.8* 10.2*   HCT 25.8* 21.8* 32.9*   MCV 86.0 81.0  --    MCH 25.7* 25.3*  --    MCHC 29.8* 31.2*  --    RDW 18.6* 19.3*  --     209  --    MPV 10.0 10.1  --      Recent Labs     04/06/22  0519 04/06/22  1230 04/07/22  0602 04/07/22  1744 04/08/22  0855      < > 142 139 139   K 4.2   < > 4.1 4.1 3.8      < > 104 104 102   CO2 20*   < > 24 23 25   BUN 13   < > 18 5* 7   CREATININE 2.4*   < > 3.4* 1. 6* 2.4*   GLUCOSE 234*   < > 148* 103* 113*   CALCIUM 8.3*   < > 8.2* 8.4* 9.0   PROT 5.9*  --  5.8*  --   --    LABALBU 2.8*  --  3.2*  --   --    BILITOT 0.3  --  0.3  --   --    ALKPHOS 167*  --  152*  --   --    AST 20  --  10  --   --    ALT 17  --  12  --   --     < > = values in this interval not displayed. Beta-Hydroxybutyrate   Date Value Ref Range Status   04/06/2022 >4.50 (H) 0.02 - 0.27 mmol/L Final   03/29/2022 >4.50 (H) 0.02 - 0.27 mmol/L Final   03/27/2022 0.10 0.02 - 0.27 mmol/L Final     Lab Results   Component Value Date    LABA1C 7.7 03/02/2022    LABA1C 8.7 12/08/2021    LABA1C 10.2 11/23/2021     Lab Results   Component Value Date/Time    TSH 5.370 (H) 03/28/2022 01:56 AM    T4FREE 1.10 03/28/2022 01:56 AM    S3ZADIS 8.6 11/05/2015 02:20 AM    FT3 1.3 (L) 10/31/2020 10:43 AM    N7JKVGN 36.73 (L) 07/20/2020 02:00 PM     Lab Results   Component Value Date    LABA1C 7.7 03/02/2022    GLUCOSE 113 04/08/2022    GLUCOSE 130 05/18/2012    MALBCR 2949.3 01/15/2020    LABMICR 1740.1 01/15/2020    LABCREA 59 01/15/2020    LABCREA 61 01/15/2020     Lab Results   Component Value Date    TRIG 82 01/14/2020    HDL 33 01/14/2020    LDLCALC 46 01/14/2020    CHOL 95 01/14/2020       Blood culture   Lab Results   Component Value Date    BC 5 Days no growth 03/28/2022    BC 5 Days no growth 03/27/2022       Radiology:  XR CHEST PORTABLE   Final Result   1. The lungs are clear. There is no infiltrate or effusion. 2. Stable position of the support lines and tubes. XR CHEST PORTABLE   Final Result   1. There is no acute cardiopulmonary disease   2. Stable position of the support lines and tubes. XR CHEST PORTABLE   Final Result   Infiltrate and or atelectasis at the left lower lobe. Substantially resolved   infiltrate and or atelectasis on the right. New NG tube. CT HEAD WO CONTRAST   Final Result   No acute intracranial abnormality or hemorrhage.          CT ABDOMEN PELVIS WO CONTRAST Additional Contrast? None   Final Result   1. Moderate ascites throughout abdomen and pelvis. 2.  Multiple thick walled loops of small bowel throughout the abdomen could   suggest nonspecific infectious or inflammatory enteritis. 3.  Generalized body wall edema. 4.  Small bilateral pleural effusions with adjacent atelectatic changes. XR ABDOMEN FOR NG/OG/NE TUBE PLACEMENT   Final Result   Enteric tube tip in the body of the stomach. XR CHEST PORTABLE   Final Result   Right perihilar opacity. XR CHEST PORTABLE    (Results Pending)       Medical Records/Labs/Images review:   I personally reviewed and summarized previous records   All labs and imaging were reviewed independently     [address] Nikolay Maldonado, a 36 y.o.-old female seen today for inpatient diabetes management     Diabetes Mellitus type I    · Extremely insulin sensitive. Pt is type 1 diabetic and needs long acting insulin   · we recommend the following sq insulin regimen   · Lantus ONE units daily in the morning   · Modified Low dose sliding scale (1u:100>200) with meals  · Continue following BG and adjust insulin regimen    primary Hypothyroidism  · Levothyroxine was adjusted during this admission to 88 mcg daily      ESRD  · Pt at risk for hypoglycemia  · On dialysis, nephrology following    Thank you for allowing us to participate in the care of this patient. Please do not hesitate to contact us with any additional questions. Arleth Castle MD  Endocrinologist, [facility] Diabetes Care and Endocrinology   [address] [place] [id number]   Phone: [phone]  Fax: [phone]  ----------------------------  An electronic signature was used to authenticate this note.  Vinnie Rodriguez MD on 4/8/2022 at 3:44 PM

## 2022-04-08 NOTE — PROGRESS NOTES
ENDOCRINOLOGY PROGRESS NOTE  Via Tele-Health Service       Date of admission: 3/27/2022  Date of service: 4/7/2022  Admitting physician: Alex Grossman DO   Primary Care Physician: Lucy Bautista MD  Consultant physician: Byron Delgadillo MD     Reason for the consultation:  DM type 1, labile diabetes     History of Present Illness: The history is provided from medical records, pt sedated intubated     Gordon Matias is a 36 y.o. old female nursing home resident with PMH of Type I DM, ESRD on PD,HTN,hypothyroidism, infective endocarditis and other listed below admitted to [facility] on 3/27/2022 because of AMS. Endocrine service was consulted for DM management.      subjective   No acute events, BG improving     Point of care glucose monitoring (Independently reviewed)   Recent Labs     04/05/22 2025 04/06/22  0542 04/06/22  1119 04/06/22  1559 04/06/22 2017 04/07/22  0527 04/07/22  1201 04/07/22  1811   GLUMET 223* 242* 264* 185* 187* 112* 98 97     Scheduled Meds:   dicyclomine  10 mg Oral TID AC    insulin glargine  1 Units SubCUTAneous QAM    albumin human  25 g IntraVENous Q8H    melatonin  6 mg Oral Nightly    insulin lispro  0-3 Units SubCUTAneous TID WC    sodium chloride flush  5-40 mL IntraVENous 2 times per day    heparin flush  1 mL IntraVENous 2 times per day    dilTIAZem  90 mg Oral Q6H    mirtazapine  15 mg Oral Nightly    escitalopram  10 mg Oral Daily    ARIPiprazole  5 mg Oral Nightly    rOPINIRole  0.25 mg Oral Nightly    levothyroxine  88 mcg Oral Daily    famotidine  10 mg Oral Daily    epoetin nena-epbx  3,000 Units SubCUTAneous Once per day on Mon Wed Fri    Vitamin D  1,000 Units Per NG tube Daily     PRN Meds:   sodium chloride, , PRN  metoclopramide, 5 mg, 4x Daily PRN  sodium chloride, , PRN  ondansetron, 4 mg, Q4H PRN  trimethobenzamide, 200 mg, Q6H PRN  sodium chloride, , PRN  loperamide, 2 mg, 4x Daily PRN  sodium chloride, , PRN  sodium chloride flush, 5-40 mL, PRN  sodium chloride, , PRN  heparin flush, 1 mL, PRN  white petrolatum, , BID PRN  polyethylene glycol, 17 g, Daily PRN  acetaminophen, 650 mg, Q6H PRN   Or  acetaminophen, 650 mg, Q6H PRN  glucose, 15 g, PRN  dextrose, 12.5 g, PRN  glucagon (rDNA), 1 mg, PRN  dextrose, 100 mL/hr, PRN  albuterol, 2.5 mg, Q6H PRN  labetalol, 5 mg, Q6H PRN      Continuous Infusions:   sodium chloride      sodium chloride      sodium chloride      sodium chloride      sodium chloride      dextrose         Review of Systems  Non-obtainable     OBJECTIVE    BP (!) 164/95   Pulse 111   Temp 98 °F (36.7 °C) (Oral)   Resp 18   Ht 5' 4\" (1.626 m)   Wt 138 lb (62.6 kg)   SpO2 98%   BMI 23.69 kg/m²     Intake/Output Summary (Last 24 hours) at 4/7/2022 2028  Last data filed at 4/7/2022 1724  Gross per 24 hour   Intake 2289.93 ml   Output 2700 ml   Net -410.07 ml       Physical examination:  Due to this being a TeleHealth encounter, evaluation of the following organ systems is limited: Vitals/Constitutional/EENT/Resp/CV/GI//MS/Neuro/Skin/Heme-Lymph-Imm. Modified physical exam through Telemedicine camera    General: lethrgic  Neck: no obvious neck mass. No obvious neck deformity     CVS: no distress   Chest: no distress. Chest is moving with respiration    Extremities:  no visible tremor  Skin: No visible rashes as seen from camera   Musculoskeletal: no visible deformity      Review of Laboratory Data:  I personally reviewed the following labs:   Recent Labs     04/05/22  0330 04/05/22  0330 04/06/22  0519 04/07/22  0602 04/07/22  1744   WBC 5.6  --  4.6 3.8*  --    RBC 2.96*  --  3.00* 2.69*  --    HGB 7.7*  7.7*   < > 7.7* 6.8* 10.2*   HCT 24.2*  24.2*   < > 25.8* 21.8* 32.9*   MCV 81.8  --  86.0 81.0  --    MCH 26.0  --  25.7* 25.3*  --    MCHC 31.8*  --  29.8* 31.2*  --    RDW 18.4*  --  18.6* 19.3*  --      --  214 209  --    MPV 10.1  --  10.0 10.1  --     < > = values in this interval not displayed.      Recent Labs     04/05/22  0330 04/05/22  1730 04/06/22  0519 04/06/22  1230 04/06/22  1950 04/07/22  0602 04/07/22  1744      < > 140   < > 141 142 139   K 5.0   < > 4.2   < > 4.3 4.1 4.1      < > 101   < > 102 104 104   CO2 25   < > 20*   < > 22 24 23   BUN 26*   < > 13   < > 17 18 5*   CREATININE 3.7*   < > 2.4*   < > 3.1* 3.4* 1.6*   GLUCOSE 243*   < > 234*   < > 179* 148* 103*   CALCIUM 8.3*   < > 8.3*   < > 8.5* 8.2* 8.4*   PROT 6.1*  --  5.9*  --   --  5.8*  --    LABALBU 3.1*  --  2.8*  --   --  3.2*  --    BILITOT 0.3  --  0.3  --   --  0.3  --    ALKPHOS 152*  --  167*  --   --  152*  --    AST 25  --  20  --   --  10  --    ALT 19  --  17  --   --  12  --     < > = values in this interval not displayed. Beta-Hydroxybutyrate   Date Value Ref Range Status   04/06/2022 >4.50 (H) 0.02 - 0.27 mmol/L Final   03/29/2022 >4.50 (H) 0.02 - 0.27 mmol/L Final   03/27/2022 0.10 0.02 - 0.27 mmol/L Final     Lab Results   Component Value Date    LABA1C 7.7 03/02/2022    LABA1C 8.7 12/08/2021    LABA1C 10.2 11/23/2021     Lab Results   Component Value Date/Time    TSH 5.370 (H) 03/28/2022 01:56 AM    T4FREE 1.10 03/28/2022 01:56 AM    W3HQLEB 8.6 11/05/2015 02:20 AM    FT3 1.3 (L) 10/31/2020 10:43 AM    G6GDUXP 36.73 (L) 07/20/2020 02:00 PM     Lab Results   Component Value Date    LABA1C 7.7 03/02/2022    GLUCOSE 103 04/07/2022    GLUCOSE 130 05/18/2012    MALBCR 2949.3 01/15/2020    LABMICR 1740.1 01/15/2020    LABCREA 59 01/15/2020    LABCREA 61 01/15/2020     Lab Results   Component Value Date    TRIG 82 01/14/2020    HDL 33 01/14/2020    LDLCALC 46 01/14/2020    CHOL 95 01/14/2020       Blood culture   Lab Results   Component Value Date    BC 5 Days no growth 03/28/2022    BC 5 Days no growth 03/27/2022       Radiology:  XR CHEST PORTABLE   Final Result   1. The lungs are clear. There is no infiltrate or effusion. 2. Stable position of the support lines and tubes.          XR CHEST PORTABLE   Final Result 1. There is no acute cardiopulmonary disease   2. Stable position of the support lines and tubes. XR CHEST PORTABLE   Final Result   Infiltrate and or atelectasis at the left lower lobe. Substantially resolved   infiltrate and or atelectasis on the right. New NG tube. CT HEAD WO CONTRAST   Final Result   No acute intracranial abnormality or hemorrhage. CT ABDOMEN PELVIS WO CONTRAST Additional Contrast? None   Final Result   1. Moderate ascites throughout abdomen and pelvis. 2.  Multiple thick walled loops of small bowel throughout the abdomen could   suggest nonspecific infectious or inflammatory enteritis. 3.  Generalized body wall edema. 4.  Small bilateral pleural effusions with adjacent atelectatic changes. XR ABDOMEN FOR NG/OG/NE TUBE PLACEMENT   Final Result   Enteric tube tip in the body of the stomach. XR CHEST PORTABLE   Final Result   Right perihilar opacity. XR CHEST PORTABLE    (Results Pending)       Medical Records/Labs/Images review:   I personally reviewed and summarized previous records   All labs and imaging were reviewed independently     Mary Maldonado, a 36 y.o.-old female seen today for inpatient diabetes management     Diabetes Mellitus type I    · Extremely insulin sensitive. Pt is type 1 diabetic and needs long acting insulin   · we recommend the following sq insulin regimen   · Lantus ONE units daily in the morning   · Modified Low dose sliding scale (1u:100>200) with meals  · Continue following BG and adjust insulin regimen    primary Hypothyroidism  · Levothyroxine was adjusted during this admission to 88 mcg daily      ESRD  · Pt at risk for hypoglycemia  · On dialysis, nephrology following    Thank you for allowing us to participate in the care of this patient. Please do not hesitate to contact us with any additional questions.      Meryle Laurence MD  Endocrinologist, [facility] Diabetes Care and Endocrinology   [address] [facility], [address] [place] [address],[id number]   Phone: [phone]  Fax: [phone]  ----------------------------  An electronic signature was used to authenticate this note.  Lizbeth Lemus MD on 4/7/2022 at 8:28 PM

## 2022-04-08 NOTE — PROGRESS NOTES
[facility]  Internal Medicine Residency Program  Progress Note - House Team 2    Patient:  Vikram Cortez 36 y.o. female MRN: [id number]     Date of Service: 4/8/2022     CC: AMS   Overnight events: NAEO     Subjective      On day 12 of hospital admission    Patient states she feels much better today and feels stronger   Denies any abdominal pain at this time   Admits to mild nausea and low appetite today   Denies any fevers, chills, nausea, vomiting    Objective     Physical Exam:  · Vitals: BP (!) 164/98   Pulse 98   Temp 98 °F (36.7 °C) (Oral)   Resp 16   Ht 5' 4\" (1.626 m)   Wt 138 lb (62.6 kg)   SpO2 96%   BMI 23.69 kg/m²       · Constitutional: Awake, alert, able to answer questions. Follows commands. In no apparent distress. · Head: Normocephalic and atraumatic. · Eyes: EOMI, conjunctiva normal, (-) scleral icterus. Mucus membranes moist.  · Mouth: Mucus membranes moist. Oropharynx clear. No deviation of the tongue or uvula. Normal dentition. · Neck: (-)  swelling, (-) JVD, trachea midline  · Respiratory: Lungs clear to auscultation bilaterally. (-)  wheezes, (-)  rales, (-)  rhonchi. No increased work of breathing or respiratory distress  · Cardiovascular: RRR. (-)  murmurs, (-) gallops,  (-) rubs. S1 and S2 were normal.   · GI:  Abdomen soft, non-tender, non-distended. (+) BS. (-) guarding, (-) rigidity. · Extremities: Warm and well perfused. (-) clubbing, (-) cyanosis. 2+ distal pulses. (-) peripheral edema. (-)Sacral pitting edema. · Neurologic:  No focal neurological deficits.   No tremor or overt seizure activity    Pertinent Labs & Imaging Studies   jorge alberto  CBC with Differential:    Lab Results   Component Value Date    WBC 3.8 04/07/2022    RBC 2.69 04/07/2022    HGB 10.2 04/07/2022    HCT 32.9 04/07/2022     04/07/2022    MCV 81.0 04/07/2022    MCH 25.3 04/07/2022    MCHC 31.2 04/07/2022    RDW 19.3 04/07/2022    NRBC 0.9 03/28/2022    SEGSPCT 67 06/27/2013    METASPCT 1.7 08/10/2020    LYMPHOPCT 23.9 04/07/2022    MONOPCT 12.0 04/07/2022    MYELOPCT 0.9 01/19/2022    BASOPCT 0.3 04/07/2022    MONOSABS 0.45 04/07/2022    LYMPHSABS 0.90 04/07/2022    EOSABS 0.19 04/07/2022    BASOSABS 0.01 04/07/2022     CMP:    Lab Results   Component Value Date     04/08/2022    K 3.8 04/08/2022    K 3.7 03/04/2022     04/08/2022    CO2 25 04/08/2022    BUN 7 04/08/2022    CREATININE 2.4 04/08/2022    GFRAA 29 04/08/2022    LABGLOM 29 04/08/2022    GLUCOSE 113 04/08/2022    GLUCOSE 130 05/18/2012    PROT 5.8 04/07/2022    LABALBU 3.2 04/07/2022    LABALBU 4.1 05/18/2012    CALCIUM 9.0 04/08/2022    BILITOT 0.3 04/07/2022    ALKPHOS 152 04/07/2022    AST 10 04/07/2022    ALT 12 04/07/2022     Magnesium:    Lab Results   Component Value Date    MG 2.1 04/07/2022     Phosphorus:    Lab Results   Component Value Date    PHOS 3.9 04/07/2022     Troponin:    Lab Results   Component Value Date    TROPONINI 0.12 05/14/2021     Resident's Assessment and Plan     Assessment     1. Nausea/Vomiting with Hx of gastroparesis 2/2 Hx of IDDM  2. HTN  3. ESRD on HD TTS  4. IDDM1-A1c 7.7% this admission  5. Hypothyroidism-TSH 5.37, FT4 1.10 normal  6. Acute on chronic anemia-s/p 1 unit PRBCs, chronic component likely due to ESRD  7. History of MDRO UTI  8. History of C. difficile infection    Resolved/Stable Problems:   · Acute hypoxic respiratory failure   · Acute metabolic encephalopathy-likely due to hypoglycemia in setting of DM1 and ESRD (CTH negative, negative UDS/SDS, NH3 normal, B12 normal 1 month ago)  · Aspiration pneumonia     Plan   Pepcid, Zofran and Bentyl to aid w/ abdominal pain/dyspepsia.  Was not on Home Med list but patient admitted to taking Reglan at home. Per chart review patient seen to be on Reglan at home and patient started on Reglan 5mg ACHS.  Per Endocrine, continue on 1U Lantus nightly w/ modified SSI. Carmen Delgadillo w/ Endocrine - Dr. Ramesh Holman.  Plan

## 2022-04-08 NOTE — CARE COORDINATION
Met with internal med who said patient can discharge once she is tolerating her diet but not until then. RN notified and she will give patient her Zofran prior to meals to help decrease nausea. Endocrinology following for diabetes/insulin management. Nephrology is following as well. I met with patient in room to discuss transition of care. She said the plan now is to go home with her mother and asked me to call her mother. I called and spoke to the patent's mother Best. She said she does plan on bringing her daughter home and not return to [place]. She said she is a Registered Nurse and has been taking care of her daughter prior to her recent stay at [address] [place] [address]. I offered home health care and she declined. Mother said she has all the DME at home she needs: Rolator, ramped entry, BSC, wheelchair and incontinence products. She said she does not have home oxygen. Patient is currently on O2 2 liters with O2 sat 92%. RN notified to ambulatory pulse ox testing. DME order placed in anticipation of home oxygen need. Referral made to [facility]. Pt goes to HD at [facility] in [place] on T/T/S at 7 AM. Mother said she will call [facility] to set up transportation for dialysis. [facility] from [place] notified of the change in discharge plan. Mom said she will transport her daughter home at time of discharge.   Digna Segundo RN CM  [phone]        PULSE OX ON ROOM AIR SITTING____%  PULSE OX ON ROOM AIR AMBULATING ____%  PULSE OX ON ____LITERS SITTING RECOVERY ____%  PULSE OX ON ____LITERS AMBULATING RECOVERY ___%

## 2022-04-08 NOTE — PROGRESS NOTES
[facility]  Internal Medicine Clinic    Attending Physician Statement:  Army Zafar BARRY I have discussed the case, including pertinent history and exam findings with the resident. I agree with the assessment, plan and orders as documented by the resident. Patient here for routine follow up of medical problems. IDDM2: multiple episodes of hypoglycemia as outpatient; endocrinology evaluating patient at this time; continue treatment per their recs; patient no longer in DKA; IVF dcd; currently prescribed Glargine 1 U BID per endocrinology; Abdominal pain, N/V, and diarrhea resolved; patinet encouraged to eat; precert reinitiated however patient is planning on going home with mother at this time instead of going to facility; plan to discuss Insulin therapy with Dr. Maryam Vega prior to discharge     End-stage renal disease on hemodialysis: Nephrology consulted and for dialysis on a TTS schedule, care per their recommendations,     Remainder of medical problems as per resident note.

## 2022-04-09 LAB
ALBUMIN SERPL-MCNC: 3.2 G/DL (ref 3.5–5.2)
ALP BLD-CCNC: 135 U/L (ref 35–104)
ALT SERPL-CCNC: 8 U/L (ref 0–32)
ANION GAP SERPL CALCULATED.3IONS-SCNC: 10 MMOL/L (ref 7–16)
AST SERPL-CCNC: 10 U/L (ref 0–31)
BASOPHILS ABSOLUTE: 0.03 E9/L (ref 0–0.2)
BASOPHILS RELATIVE PERCENT: 0.8 % (ref 0–2)
BILIRUB SERPL-MCNC: 0.5 MG/DL (ref 0–1.2)
BILIRUBIN DIRECT: <0.2 MG/DL (ref 0–0.3)
BILIRUBIN, INDIRECT: ABNORMAL MG/DL (ref 0–1)
BUN BLDV-MCNC: 10 MG/DL (ref 6–20)
CALCIUM IONIZED: 1.27 MMOL/L (ref 1.15–1.33)
CALCIUM SERPL-MCNC: 9 MG/DL (ref 8.6–10.2)
CHLORIDE BLD-SCNC: 103 MMOL/L (ref 98–107)
CO2: 26 MMOL/L (ref 22–29)
CREAT SERPL-MCNC: 3.2 MG/DL (ref 0.5–1)
EOSINOPHILS ABSOLUTE: 0.24 E9/L (ref 0.05–0.5)
EOSINOPHILS RELATIVE PERCENT: 6.2 % (ref 0–6)
GFR AFRICAN AMERICAN: 21
GFR NON-AFRICAN AMERICAN: 21 ML/MIN/1.73
GLUCOSE BLD-MCNC: 72 MG/DL (ref 74–99)
HCT VFR BLD CALC: 30.8 % (ref 34–48)
HEMOGLOBIN: 9.9 G/DL (ref 11.5–15.5)
IMMATURE GRANULOCYTES #: 0.01 E9/L
IMMATURE GRANULOCYTES %: 0.3 % (ref 0–5)
LYMPHOCYTES ABSOLUTE: 1.2 E9/L (ref 1.5–4)
LYMPHOCYTES RELATIVE PERCENT: 30.8 % (ref 20–42)
MAGNESIUM: 2 MG/DL (ref 1.6–2.6)
MCH RBC QN AUTO: 26.3 PG (ref 26–35)
MCHC RBC AUTO-ENTMCNC: 32.1 % (ref 32–34.5)
MCV RBC AUTO: 81.9 FL (ref 80–99.9)
METER GLUCOSE: 104 MG/DL (ref 74–99)
METER GLUCOSE: 125 MG/DL (ref 74–99)
METER GLUCOSE: 77 MG/DL (ref 74–99)
METER GLUCOSE: 79 MG/DL (ref 74–99)
METER GLUCOSE: 89 MG/DL (ref 74–99)
MONOCYTES ABSOLUTE: 0.42 E9/L (ref 0.1–0.95)
MONOCYTES RELATIVE PERCENT: 10.8 % (ref 2–12)
NEUTROPHILS ABSOLUTE: 2 E9/L (ref 1.8–7.3)
NEUTROPHILS RELATIVE PERCENT: 51.1 % (ref 43–80)
PDW BLD-RTO: 18.7 FL (ref 11.5–15)
PHOSPHORUS: 3 MG/DL (ref 2.5–4.5)
PLATELET # BLD: 185 E9/L (ref 130–450)
PMV BLD AUTO: 10.1 FL (ref 7–12)
POTASSIUM SERPL-SCNC: 3.5 MMOL/L (ref 3.5–5)
RBC # BLD: 3.76 E12/L (ref 3.5–5.5)
SODIUM BLD-SCNC: 139 MMOL/L (ref 132–146)
TOTAL PROTEIN: 6.3 G/DL (ref 6.4–8.3)
WBC # BLD: 3.9 E9/L (ref 4.5–11.5)

## 2022-04-09 PROCEDURE — 90935 HEMODIALYSIS ONE EVALUATION: CPT

## 2022-04-09 PROCEDURE — 80048 BASIC METABOLIC PNL TOTAL CA: CPT

## 2022-04-09 PROCEDURE — 36415 COLL VENOUS BLD VENIPUNCTURE: CPT

## 2022-04-09 PROCEDURE — 6370000000 HC RX 637 (ALT 250 FOR IP): Performed by: STUDENT IN AN ORGANIZED HEALTH CARE EDUCATION/TRAINING PROGRAM

## 2022-04-09 PROCEDURE — 99231 SBSQ HOSP IP/OBS SF/LOW 25: CPT | Performed by: INTERNAL MEDICINE

## 2022-04-09 PROCEDURE — 6370000000 HC RX 637 (ALT 250 FOR IP): Performed by: INTERNAL MEDICINE

## 2022-04-09 PROCEDURE — 6360000002 HC RX W HCPCS: Performed by: INTERNAL MEDICINE

## 2022-04-09 PROCEDURE — 80076 HEPATIC FUNCTION PANEL: CPT

## 2022-04-09 PROCEDURE — 99232 SBSQ HOSP IP/OBS MODERATE 35: CPT | Performed by: INTERNAL MEDICINE

## 2022-04-09 PROCEDURE — 5A1D70Z PERFORMANCE OF URINARY FILTRATION, INTERMITTENT, LESS THAN 6 HOURS PER DAY: ICD-10-PCS | Performed by: INTERNAL MEDICINE

## 2022-04-09 PROCEDURE — 82330 ASSAY OF CALCIUM: CPT

## 2022-04-09 PROCEDURE — 2700000000 HC OXYGEN THERAPY PER DAY

## 2022-04-09 PROCEDURE — 1200000000 HC SEMI PRIVATE

## 2022-04-09 PROCEDURE — 2580000003 HC RX 258: Performed by: INTERNAL MEDICINE

## 2022-04-09 PROCEDURE — 82962 GLUCOSE BLOOD TEST: CPT

## 2022-04-09 PROCEDURE — 85025 COMPLETE CBC W/AUTO DIFF WBC: CPT

## 2022-04-09 PROCEDURE — S5553 INSULIN LONG ACTING 5 U: HCPCS | Performed by: INTERNAL MEDICINE

## 2022-04-09 PROCEDURE — 83735 ASSAY OF MAGNESIUM: CPT

## 2022-04-09 PROCEDURE — 84100 ASSAY OF PHOSPHORUS: CPT

## 2022-04-09 RX ORDER — POTASSIUM CHLORIDE 20 MEQ/1
20 TABLET, EXTENDED RELEASE ORAL ONCE
Status: COMPLETED | OUTPATIENT
Start: 2022-04-09 | End: 2022-04-09

## 2022-04-09 RX ORDER — INSULIN GLARGINE-YFGN 100 [IU]/ML
1 INJECTION, SOLUTION SUBCUTANEOUS NIGHTLY
Status: DISCONTINUED | OUTPATIENT
Start: 2022-04-09 | End: 2022-04-11

## 2022-04-09 RX ORDER — POTASSIUM CHLORIDE 20 MEQ/1
40 TABLET, EXTENDED RELEASE ORAL ONCE
Status: DISCONTINUED | OUTPATIENT
Start: 2022-04-09 | End: 2022-04-09

## 2022-04-09 RX ADMIN — Medication 10 ML: at 12:44

## 2022-04-09 RX ADMIN — INSULIN GLARGINE-YFGN 1 UNITS: 100 INJECTION, SOLUTION SUBCUTANEOUS at 22:06

## 2022-04-09 RX ADMIN — Medication 6 MG: at 22:02

## 2022-04-09 RX ADMIN — ARIPIPRAZOLE 5 MG: 5 TABLET ORAL at 22:01

## 2022-04-09 RX ADMIN — ESCITALOPRAM 10 MG: 10 TABLET, FILM COATED ORAL at 12:43

## 2022-04-09 RX ADMIN — ONDANSETRON 4 MG: 2 INJECTION INTRAMUSCULAR; INTRAVENOUS at 22:06

## 2022-04-09 RX ADMIN — DICYCLOMINE HYDROCHLORIDE 10 MG: 10 CAPSULE ORAL at 19:03

## 2022-04-09 RX ADMIN — MIRTAZAPINE 15 MG: 15 TABLET, FILM COATED ORAL at 22:02

## 2022-04-09 RX ADMIN — DILTIAZEM HYDROCHLORIDE 90 MG: 30 TABLET, FILM COATED ORAL at 04:04

## 2022-04-09 RX ADMIN — ONDANSETRON 4 MG: 2 INJECTION INTRAMUSCULAR; INTRAVENOUS at 17:03

## 2022-04-09 RX ADMIN — DILTIAZEM HYDROCHLORIDE 90 MG: 30 TABLET, FILM COATED ORAL at 22:02

## 2022-04-09 RX ADMIN — SODIUM CHLORIDE, PRESERVATIVE FREE 100 UNITS: 5 INJECTION INTRAVENOUS at 12:44

## 2022-04-09 RX ADMIN — DILTIAZEM HYDROCHLORIDE 90 MG: 30 TABLET, FILM COATED ORAL at 12:43

## 2022-04-09 RX ADMIN — DICYCLOMINE HYDROCHLORIDE 10 MG: 10 CAPSULE ORAL at 12:43

## 2022-04-09 RX ADMIN — ROPINIROLE 0.25 MG: 0.25 TABLET, FILM COATED ORAL at 22:02

## 2022-04-09 RX ADMIN — DICYCLOMINE HYDROCHLORIDE 10 MG: 10 CAPSULE ORAL at 06:18

## 2022-04-09 RX ADMIN — POTASSIUM CHLORIDE 20 MEQ: 20 TABLET, EXTENDED RELEASE ORAL at 12:42

## 2022-04-09 RX ADMIN — LEVOTHYROXINE SODIUM 88 MCG: 0.09 TABLET ORAL at 06:18

## 2022-04-09 RX ADMIN — DILTIAZEM HYDROCHLORIDE 90 MG: 30 TABLET, FILM COATED ORAL at 19:03

## 2022-04-09 RX ADMIN — SODIUM CHLORIDE, PRESERVATIVE FREE 100 UNITS: 5 INJECTION INTRAVENOUS at 22:02

## 2022-04-09 RX ADMIN — Medication 10 ML: at 22:02

## 2022-04-09 ASSESSMENT — PAIN SCALES - GENERAL
PAINLEVEL_OUTOF10: 0
PAINLEVEL_OUTOF10: 0

## 2022-04-09 NOTE — PLAN OF CARE
Problem: Skin Integrity:  Goal: Will show no infection signs and symptoms  Description: Will show no infection signs and symptoms  4/8/2022 2249 by Mylene Waters RN  Outcome: Ongoing  4/8/2022 1026 by Shreyas Sparrow RN  Outcome: Met This Shift  Goal: Absence of new skin breakdown  Description: Absence of new skin breakdown  4/8/2022 2249 by Mylene Waters RN  Outcome: Ongoing  4/8/2022 1026 by Shreyas Sparrow RN  Outcome: Met This Shift     Problem: Serum Glucose Level - Abnormal:  Goal: Ability to maintain appropriate glucose levels has stabilized  Description: Ability to maintain appropriate glucose levels has stabilized  4/8/2022 2249 by Mylene Waters RN  Outcome: Ongoing  4/8/2022 1026 by Shreyas Sparrow RN  Outcome: Met This Shift     Problem: Breathing Pattern - Ineffective:  Goal: Ability to achieve and maintain a regular respiratory rate will improve  Description: Ability to achieve and maintain a regular respiratory rate will improve  4/8/2022 2249 by Mylene Waters RN  Outcome: Ongoing  4/8/2022 1026 by Shreyas Sparrow RN  Outcome: Met This Shift     Problem: Cardiac Output - Decreased:  Goal: Hemodynamic stability will improve  Description: Hemodynamic stability will improve  Outcome: Ongoing     Problem: Diarrhea:  Goal: Bowel elimination is within specified parameters  Description: Bowel elimination is within specified parameters  4/8/2022 2249 by Mylene Waters RN  Outcome: Ongoing  4/8/2022 1026 by Shreyas Sparrow RN  Outcome: Met This Shift  Goal: Passage of soft, formed stool  Description: Passage of soft, formed stool  4/8/2022 2249 by yMlene Waters RN  Outcome: Ongoing  4/8/2022 1026 by Shreyas Sparrow RN  Outcome: Met This Shift  Goal: Establishment of normal bowel function will improve to within specified parameters  Description: Establishment of normal bowel function will improve to within specified parameters  4/8/2022 2249 by Mylene Waters RN  Outcome: Ongoing  4/8/2022 1026 by Heather Mariscal RN  Outcome: Met This Shift     Problem: Pain:  Goal: Pain level will decrease  Description: Pain level will decrease  4/8/2022 2249 by Taran Le RN  Outcome: Ongoing  4/8/2022 1026 by Heather Mariscal RN  Outcome: Met This Shift  Goal: Control of acute pain  Description: Control of acute pain  4/8/2022 2249 by Taran Le RN  Outcome: Ongoing  4/8/2022 1026 by Heather Mariscal RN  Outcome: Met This Shift  Goal: Control of chronic pain  Description: Control of chronic pain  4/8/2022 2249 by Taran Le RN  Outcome: Ongoing  4/8/2022 1026 by Heather Mariscal RN  Outcome: Met This Shift

## 2022-04-09 NOTE — PROGRESS NOTES
[facility]  Internal Medicine Residency Program  Progress Note - House Team 2    Patient:  Kiera Dorsey 36 y.o. female MRN: [id number]     Date of Service: 4/9/2022     CC: AMS   Overnight events: NAEO     Subjective      On day 13 of hospital admission    Patient seen at dialysis   Patient resting comfortably and mildly lethargic when seen   States she has been having less nausea and states that she is tolerating her diet. Objective     Physical Exam:  · Vitals: BP (!) 162/98   Pulse 98   Temp 98 °F (36.7 °C)   Resp 16   Ht 5' 4\" (1.626 m)   Wt 133 lb 9.6 oz (60.6 kg)   SpO2 99%   BMI 22.93 kg/m²       · Constitutional: Awake, alert, able to answer questions. Follows commands. In no apparent distress. · Head: Normocephalic and atraumatic. · Eyes: EOMI, conjunctiva normal, (-) scleral icterus. Mucus membranes moist.  · Mouth: Mucus membranes moist. Oropharynx clear. No deviation of the tongue or uvula. Normal dentition. · Neck: (-)  swelling, (-) JVD, trachea midline  · Respiratory: Lungs clear to auscultation bilaterally. (-)  wheezes, (-)  rales, (-)  rhonchi. No increased work of breathing or respiratory distress  · Cardiovascular: RRR. (-)  murmurs, (-) gallops,  (-) rubs. S1 and S2 were normal.   · GI:  Abdomen soft, non-tender, non-distended. (+) BS. (-) guarding, (-) rigidity. · Extremities: Warm and well perfused. (-) clubbing, (-) cyanosis. 2+ distal pulses. (-) peripheral edema. (-)Sacral pitting edema. · Neurologic:  No focal neurological deficits.   No tremor or overt seizure activity    Pertinent Labs & Imaging Studies   jorge alberto  CBC with Differential:    Lab Results   Component Value Date    WBC 3.9 04/09/2022    RBC 3.76 04/09/2022    HGB 9.9 04/09/2022    HCT 30.8 04/09/2022     04/09/2022    MCV 81.9 04/09/2022    MCH 26.3 04/09/2022    MCHC 32.1 04/09/2022    RDW 18.7 04/09/2022    NRBC 0.9 03/28/2022    SEGSPCT 67 06/27/2013    METASPCT 1.7 08/10/2020    LYMPHOPCT 30.8 04/09/2022    MONOPCT 10.8 04/09/2022    MYELOPCT 0.9 01/19/2022    BASOPCT 0.8 04/09/2022    MONOSABS 0.42 04/09/2022    LYMPHSABS 1.20 04/09/2022    EOSABS 0.24 04/09/2022    BASOSABS 0.03 04/09/2022     CMP:    Lab Results   Component Value Date     04/09/2022    K 3.5 04/09/2022    K 3.7 03/04/2022     04/09/2022    CO2 26 04/09/2022    BUN 10 04/09/2022    CREATININE 3.2 04/09/2022    GFRAA 21 04/09/2022    LABGLOM 21 04/09/2022    GLUCOSE 72 04/09/2022    GLUCOSE 130 05/18/2012    PROT 6.3 04/09/2022    LABALBU 3.2 04/09/2022    LABALBU 4.1 05/18/2012    CALCIUM 9.0 04/09/2022    BILITOT 0.5 04/09/2022    ALKPHOS 135 04/09/2022    AST 10 04/09/2022    ALT 8 04/09/2022     Magnesium:    Lab Results   Component Value Date    MG 2.0 04/09/2022     Phosphorus:    Lab Results   Component Value Date    PHOS 3.0 04/09/2022     Troponin:    Lab Results   Component Value Date    TROPONINI 0.12 05/14/2021     Resident's Assessment and Plan     Assessment     1. Nausea/Vomiting with Hx of gastroparesis 2/2 Hx of IDDM  2. HTN  3. ESRD on HD TTS  4. IDDM1-A1c 7.7% this admission  5. Hypothyroidism-TSH 5.37, FT4 1.10 normal  6. Acute on chronic anemia-s/p 1 unit PRBCs, chronic component likely due to ESRD  7. History of MDRO UTI  8. History of C. difficile infection    Resolved/Stable Problems:   · Acute hypoxic respiratory failure   · Acute metabolic encephalopathy-likely due to hypoglycemia in setting of DM1 and ESRD (CTH negative, negative UDS/SDS, NH3 normal, B12 normal 1 month ago)  · Aspiration pneumonia     Plan   Pepcid, Zofran and Bentyl to aid w/ abdominal pain/dyspepsia.  Per chart review patient seen to be on Reglan at home and patient started on Reglan 5mg ACHS.  Per Endocrine, continue on 1U Lantus nightly w/ modified SSI. Jonathan Herman w/ Endocrine - Dr. Keisha Herndon. Plan to continue f/u as outpatient when pt discharged.  Pt will likely need cosyntropin stimulation test as outpatient   o Per Endo, upon discharge, patient to be on Lantus 1u nighty with current sliding scale with meals.  Patient is NOT to be on bedtime sliding scale   Continue Synthroid 88 mcg daily   HD TThS per nephro   Continue Abilify and Celexa   Glucose checks q4hrs   Appreciate Endo, nephro, recommendations    PT/OT evaluation: Not indicated at this time  DVT prophylaxis/ GI prophylaxis: Heparin / Pepcid  Disposition: Continue current care    Diogo Galeas MD, PGY-1  Attending physician: Dr. Harika Wilson

## 2022-04-09 NOTE — PROGRESS NOTES
Department of Internal Medicine  Nephrology Progress Note      Events reviewed. SUBJECTIVE:  We are following Miss Carmelita Robins for ESKD on HD. Patient reports continued nausea. Seen on HD. Tolerating well.      PHYSICAL EXAM:      Vitals:    VITALS:  BP (!) 159/101   Pulse 98   Temp 98.1 °F (36.7 °C)   Resp 16   Ht 5' 4\" (1.626 m)   Wt 138 lb 10.7 oz (62.9 kg)   SpO2 99%   BMI 23.80 kg/m²   24HR INTAKE/OUTPUT:      Intake/Output Summary (Last 24 hours) at 4/9/2022 0936  Last data filed at 4/9/2022 1779  Gross per 24 hour   Intake 135 ml   Output --   Net 135 ml       Access: RIJ tunneled dialysis catheter  Constitutional: Awake, alert, NAD  HEENT:  PERRL, normocephalic, atraumatic  Respiratory:  CTA bilateral  Cardiovascular/Edema:  ST, RRR, no murmur gallop or rub  Gastrointestinal:  abd distended, c/o persistent abdominal pain  Neurologic: A&OX3 follows commands, no focal deficit  Skin:  Warm, dry, ecchymotic areas to abdomen    Other:    Scheduled Meds:   potassium chloride  20 mEq Oral Once    dicyclomine  10 mg Oral TID AC    insulin glargine  1 Units SubCUTAneous QAM    melatonin  6 mg Oral Nightly    insulin lispro  0-3 Units SubCUTAneous TID WC    sodium chloride flush  5-40 mL IntraVENous 2 times per day    heparin flush  1 mL IntraVENous 2 times per day    dilTIAZem  90 mg Oral Q6H    mirtazapine  15 mg Oral Nightly    escitalopram  10 mg Oral Daily    ARIPiprazole  5 mg Oral Nightly    rOPINIRole  0.25 mg Oral Nightly    levothyroxine  88 mcg Oral Daily    famotidine  10 mg Oral Daily    epoetin nena-epbx  3,000 Units SubCUTAneous Once per day on Mon Wed Fri    Vitamin D  1,000 Units Per NG tube Daily     Continuous Infusions:   sodium chloride      sodium chloride      sodium chloride      sodium chloride      sodium chloride      dextrose       PRN Meds:.sodium chloride, metoclopramide, sodium chloride, ondansetron, trimethobenzamide, sodium chloride, loperamide, sodium chloride, sodium chloride flush, sodium chloride, heparin flush, white petrolatum, polyethylene glycol, acetaminophen **OR** acetaminophen, glucose, dextrose, glucagon (rDNA), dextrose, albuterol, labetalol    DATA:    CBC:   Lab Results   Component Value Date    WBC 3.9 04/09/2022    RBC 3.76 04/09/2022    HGB 9.9 04/09/2022    HCT 30.8 04/09/2022    MCV 81.9 04/09/2022    MCH 26.3 04/09/2022    MCHC 32.1 04/09/2022    RDW 18.7 04/09/2022     04/09/2022    MPV 10.1 04/09/2022     CMP:    Lab Results   Component Value Date     04/09/2022    K 3.5 04/09/2022    K 3.7 03/04/2022     04/09/2022    CO2 26 04/09/2022    BUN 10 04/09/2022    CREATININE 3.2 04/09/2022    GFRAA 21 04/09/2022    LABGLOM 21 04/09/2022    GLUCOSE 72 04/09/2022    GLUCOSE 130 05/18/2012    PROT 6.3 04/09/2022    LABALBU 3.2 04/09/2022    LABALBU 4.1 05/18/2012    CALCIUM 9.0 04/09/2022    BILITOT 0.5 04/09/2022    ALKPHOS 135 04/09/2022    AST 10 04/09/2022    ALT 8 04/09/2022     Magnesium:    Lab Results   Component Value Date    MG 2.0 04/09/2022     Phosphorus:    Lab Results   Component Value Date    PHOS 3.0 04/09/2022     Radiology Review:      CT Head WO Contrast March 27, 2022   No acute intracranial abnormality or hemorrhage.         CT Abdomen Pelvis WO Contrast March 27, 2022   1.  Moderate ascites throughout abdomen and pelvis.       2.  Multiple thick walled loops of small bowel throughout the abdomen could   suggest nonspecific infectious or inflammatory enteritis.       3.  Generalized body wall edema.       4.  Small bilateral pleural effusions with adjacent atelectatic changes.             Chest X-Ray March 28, 2022   Infiltrate and or atelectasis at the left lower lobe.  Substantially resolved   infiltrate and or atelectasis on the right.  New NG tube.               BRIEF SUMMARY OF INITIAL CONSULT:    Briefly, Miss Wilber Andujar is a 36year-old female with a PMH of ESRD on hemodialysis 3 days/wk, on TTS schedule at [facility] I DM, poorly controlled with recurrent admissions for DKA, HTN, gastroparesis, ascites, infective endocarditis, cardiac arrest, hypothyroidism, recent Covid, and depression who was admitted on March 27, 2022 after she was found unresponsive at the SNF where she resides. Patient was found to be profoundly hypoglycemic and EMS was called. She was intubated in ER for airway protection as she remained unresponsive. CT head showed no acute intracranial abnormality or hemorrhage, chest x-ray demonstrates right perihilar opacity concerning for aspiration pneumonia. She was ultimately admitted to MICU for further evaluation. Labs on admission were significant for sodium 135, potassium 5.0, chloride 100, bicarbonate 25, BUN 38, creatinine 4.9, proBNP >70,000. We are consulted for dialysis management. Problems resolved. · Hypoglycemia, was on D10, now hyperglycemic with +ketones (beta-hydroxybutyrate >4.5)  · Acute respiratory failure, 2/2 aspiration pneumonia? On 40% Fio2. Extubated 6/72  · Acute metabolic encephalopathy 2/2 hypoglycemia. Resolving   · Acidemia, pH 7.311, PCO2 35.0, HCO3 76.5, metabolic acidosis from DKA    IMPRESSION/RECOMMENDATIONS:      1. ESKD 3 times a week TTS via RIJ tunneled dialysis catheter. HD initiated September 2021 after failed peritoneal dialysis with persistent hyperkalemia. For dialysis three times/wk TTS while inpatient. Seen on HD this morning. 2. HTN, on cardizem 90 mg po qid  3. MBD of CKD, currently with hypophosphatemia. Binders d/c'd  4. Anemia of CKD, continue epoetin alpha 3,000 unit 3 times/wk. s/p transfusion   5. Vitamin D deficiency, on ergocalciferol 1000 po daily  ------------------------------------------------------  6. Type I DM, poorly controlled with frequent readmissions for DKA, on SSI, lantus decreased to 1 unit nightly  7. Aspiration pneumonia? S/p piperacillin    8. Restless leg syndrome, on ropinirole at home  9.  Depression, on escitalopram and Abilify  10. Hypothyroidism, on levothyroxine   11. History of gastroparesis, on metoclopramide at home. For EGD and pyloric dilatation with Botox injection outpatient per GI.  12. Hx recurrent C. difficile infection, on flagyl po Q 8hrs   13. Hx of MDRO UTI  14. Nutrition, diabetic diet  15. DKA  16. Anemia of chronic disease     Plan:     · Continue HD three times/wk, TTS while inpatient.   · Continue  SPA 25 gm iv tid x 6 doses  · Continue epoetin alpha 3,000 units 3 times/wk  · Continue to monitor labs daily   · Continue to monitor glucose levels  · Monitor phosphorus levels   · Monitor H&H, transfuse <7, receiving transfusion today  · Discharge planning    Electronically signed by HEBER Sultana CNP on 4/9/2022 at 9:36 AM

## 2022-04-09 NOTE — PROGRESS NOTES
[facility]  Internal Medicine Residency / [address]    Attending Physician Statement  I have discussed the case, including pertinent history and exam findings with the resident and the team.  I have seen and examined the patient and the key elements of the encounter have been performed by me. I agree with the assessment, plan and orders as documented by the resident. Case Discussed During AM Rounds   Covering for Dr. Mitra Jimenez and House Team 2 this weekend   Seen in HD- HD completed today    Close glucose monitoring ongoing   Concern with continued lower readings- management per ENDO    Continue current management and close monitoring      Metabolic Encephalopathy secondary to recurrent Hypoglycemia    ENDO following    Continue current management     ESRD on HD    HD today    Nephrology following     DKA in the setting of Type I DM and labile glucose readings- resolved    ENDO following     Remainder of medical problems as per resident note.       Krystle Barrera MD, [address]   Internal Medicine Residency Faculty

## 2022-04-09 NOTE — FLOWSHEET NOTE
04/09/22 1133   Vital Signs   BP (!) 162/98   Temp 98 °F (36.7 °C)   Pulse 98   Resp 16   Weight 133 lb 9.6 oz (60.6 kg)   Weight Method Bed scale   Percent Weight Change -3.66   Post-Hemodialysis Assessment   Post-Treatment Procedures Blood returned;Catheter capped, clamped and heparinized x 2 ports   Machine Disinfection Process Acid/Vinegar Clean;Bleach; Exterior Machine Disinfection   Rinseback Volume (ml) 300 ml   Total Liters Processed (l/min) 70 l/min   Dialyzer Clearance Clear   Duration of Treatment (minutes) 210 minutes   Hemodialysis Intake (ml) 300 ml   Hemodialysis Output (ml) 2600 ml   NET Removed (ml) 2300 ml   Tolerated Treatment Good   Patient Response to Treatment stasis achieved cath care per policy and procedure pt has no complaints and is being transported back to room

## 2022-04-09 NOTE — PLAN OF CARE
Problem: Skin Integrity:  Goal: Will show no infection signs and symptoms  Description: Will show no infection signs and symptoms  4/8/2022 2249 by Mauro Driver RN  Outcome: Met This Shift  4/8/2022 2249 by Mauro Driver RN  Outcome: Ongoing  4/8/2022 1026 by Neil Blanc RN  Outcome: Met This Shift  Goal: Absence of new skin breakdown  Description: Absence of new skin breakdown  4/8/2022 2249 by Mauro Driver RN  Outcome: Met This Shift  4/8/2022 2249 by Mauro Driver RN  Outcome: Ongoing  4/8/2022 1026 by Neil Blanc RN  Outcome: Met This Shift     Problem: Serum Glucose Level - Abnormal:  Goal: Ability to maintain appropriate glucose levels has stabilized  Description: Ability to maintain appropriate glucose levels has stabilized  4/8/2022 2249 by Mauro Driver RN  Outcome: Met This Shift  4/8/2022 2249 by Mauro Driver RN  Outcome: Ongoing  4/8/2022 1026 by Neil Blanc RN  Outcome: Met This Shift     Problem: Breathing Pattern - Ineffective:  Goal: Ability to achieve and maintain a regular respiratory rate will improve  Description: Ability to achieve and maintain a regular respiratory rate will improve  4/8/2022 2249 by Mauro Driver RN  Outcome: Met This Shift  4/8/2022 2249 by Mauro Driver RN  Outcome: Ongoing  4/8/2022 1026 by Neil Blanc RN  Outcome: Met This Shift     Problem: Cardiac Output - Decreased:  Goal: Hemodynamic stability will improve  Description: Hemodynamic stability will improve  4/8/2022 2249 by Mauro Driver RN  Outcome: Met This Shift  4/8/2022 2249 by Mauro Driver RN  Outcome: Ongoing     Problem: Diarrhea:  Goal: Bowel elimination is within specified parameters  Description: Bowel elimination is within specified parameters  4/8/2022 2249 by Mauro Driver RN  Outcome: Met This Shift  4/8/2022 2249 by Mauro Driver RN  Outcome: Ongoing  4/8/2022 1026 by Neil Blanc RN  Outcome: Met This Shift  Goal: Passage of soft, formed stool  Description: Passage of soft, formed stool  4/8/2022 2249 by Lillian Mendoza RN  Outcome: Met This Shift  4/8/2022 2249 by Lillian Mendoza RN  Outcome: Ongoing  4/8/2022 1026 by Tc Rasheed RN  Outcome: Met This Shift  Goal: Establishment of normal bowel function will improve to within specified parameters  Description: Establishment of normal bowel function will improve to within specified parameters  4/8/2022 2249 by Lillian Mendoza RN  Outcome: Met This Shift  4/8/2022 2249 by Lillian Mendoza RN  Outcome: Ongoing  4/8/2022 1026 by Tc Rasheed RN  Outcome: Met This Shift     Problem: Pain:  Goal: Pain level will decrease  Description: Pain level will decrease  4/8/2022 2249 by Lillian Mendoza RN  Outcome: Met This Shift  4/8/2022 2249 by Lillian Mendoza RN  Outcome: Ongoing  4/8/2022 1026 by Tc Rasheed RN  Outcome: Met This Shift  Goal: Control of acute pain  Description: Control of acute pain  4/8/2022 2249 by Lillian Mendoza RN  Outcome: Met This Shift  4/8/2022 2249 by Lillian Mendoza RN  Outcome: Ongoing  4/8/2022 1026 by Tc Rasheed RN  Outcome: Met This Shift  Goal: Control of chronic pain  Description: Control of chronic pain  4/8/2022 2249 by Lillian Mendoza RN  Outcome: Met This Shift  4/8/2022 2249 by Lillian Mendoza RN  Outcome: Ongoing  4/8/2022 1026 by Tc Rasheed RN  Outcome: Met This Shift

## 2022-04-10 LAB
METER GLUCOSE: 108 MG/DL (ref 74–99)
METER GLUCOSE: 124 MG/DL (ref 74–99)
METER GLUCOSE: 139 MG/DL (ref 74–99)
METER GLUCOSE: 141 MG/DL (ref 74–99)
METER GLUCOSE: 190 MG/DL (ref 74–99)
METER GLUCOSE: 43 MG/DL (ref 74–99)
METER GLUCOSE: 60 MG/DL (ref 74–99)
METER GLUCOSE: 61 MG/DL (ref 74–99)
METER GLUCOSE: 77 MG/DL (ref 74–99)

## 2022-04-10 PROCEDURE — 2580000003 HC RX 258: Performed by: INTERNAL MEDICINE

## 2022-04-10 PROCEDURE — 6370000000 HC RX 637 (ALT 250 FOR IP): Performed by: INTERNAL MEDICINE

## 2022-04-10 PROCEDURE — 6370000000 HC RX 637 (ALT 250 FOR IP): Performed by: NURSE PRACTITIONER

## 2022-04-10 PROCEDURE — 99232 SBSQ HOSP IP/OBS MODERATE 35: CPT | Performed by: INTERNAL MEDICINE

## 2022-04-10 PROCEDURE — 82962 GLUCOSE BLOOD TEST: CPT

## 2022-04-10 PROCEDURE — 6360000002 HC RX W HCPCS: Performed by: INTERNAL MEDICINE

## 2022-04-10 PROCEDURE — 1200000000 HC SEMI PRIVATE

## 2022-04-10 PROCEDURE — 2500000003 HC RX 250 WO HCPCS: Performed by: INTERNAL MEDICINE

## 2022-04-10 PROCEDURE — 6370000000 HC RX 637 (ALT 250 FOR IP): Performed by: STUDENT IN AN ORGANIZED HEALTH CARE EDUCATION/TRAINING PROGRAM

## 2022-04-10 PROCEDURE — 2700000000 HC OXYGEN THERAPY PER DAY

## 2022-04-10 RX ADMIN — Medication 1000 UNITS: at 10:57

## 2022-04-10 RX ADMIN — ONDANSETRON 4 MG: 2 INJECTION INTRAMUSCULAR; INTRAVENOUS at 22:21

## 2022-04-10 RX ADMIN — Medication 6 MG: at 22:14

## 2022-04-10 RX ADMIN — ARIPIPRAZOLE 5 MG: 5 TABLET ORAL at 22:14

## 2022-04-10 RX ADMIN — LEVOTHYROXINE SODIUM 88 MCG: 0.09 TABLET ORAL at 05:47

## 2022-04-10 RX ADMIN — SODIUM CHLORIDE, PRESERVATIVE FREE 100 UNITS: 5 INJECTION INTRAVENOUS at 10:57

## 2022-04-10 RX ADMIN — SODIUM CHLORIDE, PRESERVATIVE FREE 100 UNITS: 5 INJECTION INTRAVENOUS at 22:13

## 2022-04-10 RX ADMIN — FAMOTIDINE 10 MG: 20 TABLET ORAL at 10:57

## 2022-04-10 RX ADMIN — DILTIAZEM HYDROCHLORIDE 90 MG: 30 TABLET, FILM COATED ORAL at 04:25

## 2022-04-10 RX ADMIN — MIRTAZAPINE 15 MG: 15 TABLET, FILM COATED ORAL at 22:13

## 2022-04-10 RX ADMIN — Medication 10 ML: at 22:14

## 2022-04-10 RX ADMIN — ESCITALOPRAM 10 MG: 10 TABLET, FILM COATED ORAL at 10:56

## 2022-04-10 RX ADMIN — Medication 12.5 G: at 12:03

## 2022-04-10 RX ADMIN — ONDANSETRON 4 MG: 2 INJECTION INTRAMUSCULAR; INTRAVENOUS at 15:51

## 2022-04-10 RX ADMIN — ACETAMINOPHEN 650 MG: 325 TABLET ORAL at 22:21

## 2022-04-10 RX ADMIN — DILTIAZEM HYDROCHLORIDE 90 MG: 30 TABLET, FILM COATED ORAL at 22:13

## 2022-04-10 RX ADMIN — ONDANSETRON 4 MG: 2 INJECTION INTRAMUSCULAR; INTRAVENOUS at 11:14

## 2022-04-10 RX ADMIN — DILTIAZEM HYDROCHLORIDE 90 MG: 30 TABLET, FILM COATED ORAL at 18:07

## 2022-04-10 RX ADMIN — DICYCLOMINE HYDROCHLORIDE 10 MG: 10 CAPSULE ORAL at 18:04

## 2022-04-10 RX ADMIN — DICYCLOMINE HYDROCHLORIDE 10 MG: 10 CAPSULE ORAL at 05:47

## 2022-04-10 RX ADMIN — Medication 10 ML: at 10:57

## 2022-04-10 RX ADMIN — DILTIAZEM HYDROCHLORIDE 90 MG: 30 TABLET, FILM COATED ORAL at 10:56

## 2022-04-10 RX ADMIN — DICYCLOMINE HYDROCHLORIDE 10 MG: 10 CAPSULE ORAL at 10:57

## 2022-04-10 RX ADMIN — ROPINIROLE 0.25 MG: 0.25 TABLET, FILM COATED ORAL at 22:14

## 2022-04-10 ASSESSMENT — PAIN SCALES - GENERAL
PAINLEVEL_OUTOF10: 0
PAINLEVEL_OUTOF10: 8

## 2022-04-10 NOTE — PLAN OF CARE
Problem: Skin Integrity:  Goal: Will show no infection signs and symptoms  Description: Will show no infection signs and symptoms  4/10/2022 0109 by Carolin Herring RN  Outcome: Met This Shift  4/9/2022 1345 by Chano Sellers RN  Outcome: Ongoing  Goal: Absence of new skin breakdown  Description: Absence of new skin breakdown  4/10/2022 0109 by Carolin Herring RN  Outcome: Met This Shift  4/9/2022 1345 by Chano Sellers RN  Outcome: Ongoing     Problem: Serum Glucose Level - Abnormal:  Goal: Ability to maintain appropriate glucose levels has stabilized  Description: Ability to maintain appropriate glucose levels has stabilized  4/10/2022 0109 by Carolin Herring RN  Outcome: Met This Shift  4/9/2022 1345 by Chano Sellers RN  Outcome: Ongoing     Problem: Breathing Pattern - Ineffective:  Goal: Ability to achieve and maintain a regular respiratory rate will improve  Description: Ability to achieve and maintain a regular respiratory rate will improve  4/10/2022 0109 by Carolin Herring RN  Outcome: Met This Shift  4/9/2022 1345 by Chano Sellers RN  Outcome: Ongoing     Problem: Cardiac Output - Decreased:  Goal: Hemodynamic stability will improve  Description: Hemodynamic stability will improve  Outcome: Met This Shift     Problem: Diarrhea:  Goal: Bowel elimination is within specified parameters  Description: Bowel elimination is within specified parameters  4/10/2022 0109 by Carolin Herring RN  Outcome: Met This Shift  4/9/2022 1345 by Chano Sellers RN  Outcome: Ongoing  Goal: Passage of soft, formed stool  Description: Passage of soft, formed stool  4/10/2022 0109 by Carolin Herring RN  Outcome: Met This Shift  4/9/2022 1345 by Chano Sellers RN  Outcome: Ongoing  Goal: Establishment of normal bowel function will improve to within specified parameters  Description: Establishment of normal bowel function will improve to within specified parameters  4/10/2022 0109 by Carolin Herring RN  Outcome: Met This Shift  4/9/2022 1345 by Brina Monahan RN  Outcome: Ongoing     Problem: Pain:  Goal: Pain level will decrease  Description: Pain level will decrease  Outcome: Met This Shift  Goal: Control of acute pain  Description: Control of acute pain  Outcome: Met This Shift  Goal: Control of chronic pain  Description: Control of chronic pain  Outcome: Met This Shift

## 2022-04-10 NOTE — PROGRESS NOTES
ENDOCRINOLOGY PROGRESS NOTE      Date of admission: 3/27/2022  Date of service: 4/10/2022  Admitting physician: Kizzy Hawk DO   Primary Care Physician: Juanita Lopez MD  Consultant physician: Melina Hodgkins MD     Reason for the consultation:  DM type 1, labile diabetes     History of Present Illness: The history is provided from medical records, pt sedated intubated     Hedy Holguin is a 36 y.o. old female nursing home resident with PMH of Type I DM, ESRD on PD,HTN,hypothyroidism, infective endocarditis and other listed below admitted to [facility] on 3/27/2022 because of AMS. Endocrine service was consulted for DM management.      subjective   Seen and examined, poor appetite, BG was low this AM     Point of care glucose monitoring (Independently reviewed)   Recent Labs     04/08/22  2030 04/09/22  0222 04/09/22  0618 04/09/22  1233 04/09/22  1843 04/09/22  2106 04/10/22  0625 04/10/22  0654   GLUMET 133* 89 77 79 104* 125* 60* 77     Scheduled Meds:   insulin glargine  1 Units SubCUTAneous Nightly    dicyclomine  10 mg Oral TID AC    melatonin  6 mg Oral Nightly    insulin lispro  0-3 Units SubCUTAneous TID WC    sodium chloride flush  5-40 mL IntraVENous 2 times per day    heparin flush  1 mL IntraVENous 2 times per day    dilTIAZem  90 mg Oral Q6H    mirtazapine  15 mg Oral Nightly    escitalopram  10 mg Oral Daily    ARIPiprazole  5 mg Oral Nightly    rOPINIRole  0.25 mg Oral Nightly    levothyroxine  88 mcg Oral Daily    famotidine  10 mg Oral Daily    epoetin nena-epbx  3,000 Units SubCUTAneous Once per day on Mon Wed Fri    Vitamin D  1,000 Units Per NG tube Daily     PRN Meds:   sodium chloride, , PRN  metoclopramide, 5 mg, 4x Daily PRN  sodium chloride, , PRN  ondansetron, 4 mg, Q4H PRN  trimethobenzamide, 200 mg, Q6H PRN  sodium chloride, , PRN  loperamide, 2 mg, 4x Daily PRN  sodium chloride, , PRN  sodium chloride flush, 5-40 mL, PRN  sodium chloride, , PRN  heparin flush, 1 mL, 0.02 - 0.27 mmol/L Final   03/29/2022 >4.50 (H) 0.02 - 0.27 mmol/L Final   03/27/2022 0.10 0.02 - 0.27 mmol/L Final     Lab Results   Component Value Date    LABA1C 7.7 03/02/2022    LABA1C 8.7 12/08/2021    LABA1C 10.2 11/23/2021     Lab Results   Component Value Date/Time    TSH 5.370 (H) 03/28/2022 01:56 AM    T4FREE 1.10 03/28/2022 01:56 AM    H3FPOAN 8.6 11/05/2015 02:20 AM    FT3 1.3 (L) 10/31/2020 10:43 AM    E1GUPGJ 36.73 (L) 07/20/2020 02:00 PM     Lab Results   Component Value Date    LABA1C 7.7 03/02/2022    GLUCOSE 72 04/09/2022    GLUCOSE 130 05/18/2012    MALBCR 2949.3 01/15/2020    LABMICR 1740.1 01/15/2020    LABCREA 59 01/15/2020    LABCREA 61 01/15/2020     Lab Results   Component Value Date    TRIG 82 01/14/2020    HDL 33 01/14/2020    LDLCALC 46 01/14/2020    CHOL 95 01/14/2020       Blood culture   Lab Results   Component Value Date    BC 5 Days no growth 03/28/2022    BC 5 Days no growth 03/27/2022       Radiology:  XR CHEST PORTABLE   Final Result   1. The lungs are clear. There is no infiltrate or effusion. 2. Stable position of the support lines and tubes. XR CHEST PORTABLE   Final Result   1. There is no acute cardiopulmonary disease   2. Stable position of the support lines and tubes. XR CHEST PORTABLE   Final Result   Infiltrate and or atelectasis at the left lower lobe. Substantially resolved   infiltrate and or atelectasis on the right. New NG tube. CT HEAD WO CONTRAST   Final Result   No acute intracranial abnormality or hemorrhage. CT ABDOMEN PELVIS WO CONTRAST Additional Contrast? None   Final Result   1. Moderate ascites throughout abdomen and pelvis. 2.  Multiple thick walled loops of small bowel throughout the abdomen could   suggest nonspecific infectious or inflammatory enteritis. 3.  Generalized body wall edema. 4.  Small bilateral pleural effusions with adjacent atelectatic changes.          XR ABDOMEN FOR NG/OG/NE TUBE PLACEMENT   Final Result   Enteric tube tip in the body of the stomach. XR CHEST PORTABLE   Final Result   Right perihilar opacity. XR CHEST PORTABLE    (Results Pending)       Medical Records/Labs/Images review:   I personally reviewed and summarized previous records   All labs and imaging were reviewed independently     Mary Maldonado, a 36 y.o.-old female seen today for inpatient diabetes management     Diabetes Mellitus type I    · Extremely insulin sensitive. Pt is type 1 diabetic and needs long acting insulin   · we recommend the following sq insulin regimen   · Lantus ONE units daily at night   · Modified Low dose sliding scale (1u:100>200) with meals  · Continue following BG and adjust insulin regimen    primary Hypothyroidism  · Levothyroxine was adjusted during this admission to 88 mcg daily      ESRD  · Pt at risk for hypoglycemia  · On dialysis, nephrology following    Thank you for allowing us to participate in the care of this patient. Please do not hesitate to contact us with any additional questions. Tova Stevens MD  Endocrinologist, [facility]   [address] [place] [address]   Phone: [phone]  Fax: [phone]  ----------------------------  An electronic signature was used to authenticate this note.  Violet Bruce MD on 4/10/2022 at 9:54 AM

## 2022-04-10 NOTE — PROGRESS NOTES
[facility]  Internal Medicine Residency / [address]    Attending Physician Statement  I have discussed the case, including pertinent history and exam findings with the resident and the team.  I have seen and examined the patient and the key elements of the encounter have been performed by me. I agree with the assessment, plan and orders as documented by the resident. Case Discussed During AM Rounds   Covering for Dr. Fariba Elliott and House Team 2 this weekend   Recurrent hypoglycemia during rounds again today- remains with poor appetite    Continued concerns with sustained hypoglycemia- unable to discharge at this time    Given juice and initially improved- but reduced again   Coordinated on rounds- took a full apple juice- close monitoring needed   Continues to complain of ongoing abdominal pain    Close monitoring needed     Metabolic Encephalopathy secondary to recurrent Hypoglycemia    ENDO following    Continue current management    Recurrent concerns   Coordinate with ENDO today     ESRD on HD    HD completed on 4/9    Nephrology following     DKA in the setting of Type I DM and labile glucose readings- DKA resolved    ENDO following     Remainder of medical problems as per resident note.       Luis Torre MD, [address]   Internal Medicine Residency Faculty

## 2022-04-10 NOTE — PROGRESS NOTES
NRBC 0.9 03/28/2022    SEGSPCT 67 06/27/2013    METASPCT 1.7 08/10/2020    LYMPHOPCT 30.8 04/09/2022    MONOPCT 10.8 04/09/2022    MYELOPCT 0.9 01/19/2022    BASOPCT 0.8 04/09/2022    MONOSABS 0.42 04/09/2022    LYMPHSABS 1.20 04/09/2022    EOSABS 0.24 04/09/2022    BASOSABS 0.03 04/09/2022     CMP:    Lab Results   Component Value Date     04/09/2022    K 3.5 04/09/2022    K 3.7 03/04/2022     04/09/2022    CO2 26 04/09/2022    BUN 10 04/09/2022    CREATININE 3.2 04/09/2022    GFRAA 21 04/09/2022    LABGLOM 21 04/09/2022    GLUCOSE 72 04/09/2022    GLUCOSE 130 05/18/2012    PROT 6.3 04/09/2022    LABALBU 3.2 04/09/2022    LABALBU 4.1 05/18/2012    CALCIUM 9.0 04/09/2022    BILITOT 0.5 04/09/2022    ALKPHOS 135 04/09/2022    AST 10 04/09/2022    ALT 8 04/09/2022     Magnesium:    Lab Results   Component Value Date    MG 2.0 04/09/2022     Phosphorus:    Lab Results   Component Value Date    PHOS 3.0 04/09/2022     Troponin:    Lab Results   Component Value Date    TROPONINI 0.12 05/14/2021     Resident's Assessment and Plan     Assessment     1. Nausea/Vomiting w/ Abdominal pain with Hx of gastroparesis 2/2 Hx of IDDM  2. HTN  3. ESRD on HD TTS  4. IDDM1-A1c 7.7% this admission  5. Hypothyroidism-TSH 5.37, FT4 1.10 normal  6. Acute on chronic anemia-s/p 3 unit PRBCs, chronic component likely due to ESRD  7. History of MDRO UTI  8. History of C. difficile infection    Resolved/Stable Problems:   · Acute hypoxic respiratory failure   · Acute metabolic encephalopathy-likely due to hypoglycemia in setting of DM1 and ESRD (CTH negative, negative UDS/SDS, NH3 normal, B12 normal 1 month ago)  · Aspiration pneumonia     Plan   Pepcid, Zofran and Bentyl to aid w/ abdominal pain/dyspepsia.  Per chart review patient seen to be on Reglan at home and patient on Reglan 5mg ACHS.  Per Endocrine, continue on 1U Lantus nightly w/ modified SSI. Laura Venegas w/ Endocrine - Dr. Neel Ugarte.  Plan to continue f/u as outpatient when pt discharged. o Per Endo, upon discharge, patient to be on Lantus 1u nighty with current sliding scale with meals.  Patient is NOT to be on bedtime sliding scale   Continue Synthroid 88 mcg daily   HD TThS per nephro   Continue Abilify and Celexa   Glucose checks q4hrs   Appreciate Endo, nephro, recommendations   Dietician consulted with plans to count calories    PT/OT evaluation: Not indicated at this time  DVT prophylaxis/ GI prophylaxis: Heparin/Pepcid  Disposition: Continue current care    Anabella Campos MD, PGY-1  Attending physician: Dr. Molina Abt

## 2022-04-10 NOTE — PLAN OF CARE
Problem: Skin Integrity:  Goal: Will show no infection signs and symptoms  Description: Will show no infection signs and symptoms  4/10/2022 1237 by Jorge Arita RN  Outcome: Met This Shift  4/10/2022 0109 by Arcadio Mendez RN  Outcome: Met This Shift  Goal: Absence of new skin breakdown  Description: Absence of new skin breakdown  4/10/2022 1237 by Jorge Arita RN  Outcome: Met This Shift  4/10/2022 0109 by Arcadio Mendez RN  Outcome: Met This Shift     Problem: Serum Glucose Level - Abnormal:  Goal: Ability to maintain appropriate glucose levels has stabilized  Description: Ability to maintain appropriate glucose levels has stabilized  4/10/2022 1237 by Jorge Arita RN  Outcome: Met This Shift  4/10/2022 0109 by Arcadio Mendez RN  Outcome: Met This Shift     Problem: Breathing Pattern - Ineffective:  Goal: Ability to achieve and maintain a regular respiratory rate will improve  Description: Ability to achieve and maintain a regular respiratory rate will improve  4/10/2022 1237 by Jorge Arita RN  Outcome: Met This Shift  4/10/2022 0109 by Arcadio Mendez RN  Outcome: Met This Shift     Problem: Cardiac Output - Decreased:  Goal: Hemodynamic stability will improve  Description: Hemodynamic stability will improve  4/10/2022 1237 by Jorge Arita RN  Outcome: Met This Shift  4/10/2022 0109 by Arcadio Mendez RN  Outcome: Met This Shift     Problem: Diarrhea:  Goal: Bowel elimination is within specified parameters  Description: Bowel elimination is within specified parameters  4/10/2022 1237 by Jorge Arita RN  Outcome: Met This Shift  4/10/2022 0109 by Arcadio Mendez RN  Outcome: Met This Shift  Goal: Passage of soft, formed stool  Description: Passage of soft, formed stool  4/10/2022 1237 by Jorge Arita RN  Outcome: Met This Shift  4/10/2022 0109 by Arcadio Mendez RN  Outcome: Met This Shift  Goal: Establishment of normal bowel function will improve to within specified parameters  Description: Establishment of normal bowel function will improve to within specified parameters  4/10/2022 1237 by Ailyn Kiran RN  Outcome: Met This Shift  4/10/2022 0109 by Janeth Suggs RN  Outcome: Met This Shift     Problem: Pain:  Goal: Pain level will decrease  Description: Pain level will decrease  4/10/2022 1237 by Ailyn Kiran RN  Outcome: Met This Shift  4/10/2022 0109 by Janeth Suggs RN  Outcome: Met This Shift  Goal: Control of acute pain  Description: Control of acute pain  4/10/2022 1237 by Ailyn Kiran RN  Outcome: Met This Shift  4/10/2022 0109 by Janeth Suggs RN  Outcome: Met This Shift  Goal: Control of chronic pain  Description: Control of chronic pain  4/10/2022 1237 by Ailyn Kiran RN  Outcome: Met This Shift  4/10/2022 0109 by Janeth Suggs RN  Outcome: Met This Shift

## 2022-04-10 NOTE — PROGRESS NOTES
Department of Internal Medicine  Nephrology Progress Note      Events reviewed. SUBJECTIVE:  We are following Miss Wilber Andujar for ESKD on HD. Patient reports continued nausea.      PHYSICAL EXAM:      Vitals:    VITALS:  BP (!) 159/99   Pulse 102   Temp 98.2 °F (36.8 °C) (Oral)   Resp 18   Ht 5' 4\" (1.626 m)   Wt 133 lb 9.6 oz (60.6 kg)   SpO2 97%   BMI 22.93 kg/m²   24HR INTAKE/OUTPUT:      Intake/Output Summary (Last 24 hours) at 4/10/2022 2707  Last data filed at 4/9/2022 1133  Gross per 24 hour   Intake 300 ml   Output 2600 ml   Net -2300 ml       Access: RIJ tunneled dialysis catheter  Constitutional: Awake, alert, NAD  HEENT:  PERRL, normocephalic, atraumatic  Respiratory:  CTA bilateral  Cardiovascular/Edema:  ST, RRR, no murmur gallop or rub  Gastrointestinal:  abd distended, c/o persistent abdominal pain  Neurologic: A&OX3 follows commands, no focal deficit  Skin:  Warm, dry, ecchymotic areas to abdomen    Other:    Scheduled Meds:   insulin glargine  1 Units SubCUTAneous Nightly    dicyclomine  10 mg Oral TID AC    melatonin  6 mg Oral Nightly    insulin lispro  0-3 Units SubCUTAneous TID WC    sodium chloride flush  5-40 mL IntraVENous 2 times per day    heparin flush  1 mL IntraVENous 2 times per day    dilTIAZem  90 mg Oral Q6H    mirtazapine  15 mg Oral Nightly    escitalopram  10 mg Oral Daily    ARIPiprazole  5 mg Oral Nightly    rOPINIRole  0.25 mg Oral Nightly    levothyroxine  88 mcg Oral Daily    famotidine  10 mg Oral Daily    epoetin nena-epbx  3,000 Units SubCUTAneous Once per day on Mon Wed Fri    Vitamin D  1,000 Units Per NG tube Daily     Continuous Infusions:   sodium chloride      sodium chloride      sodium chloride      sodium chloride      sodium chloride      dextrose       PRN Meds:.sodium chloride, metoclopramide, sodium chloride, ondansetron, trimethobenzamide, sodium chloride, loperamide, sodium chloride, sodium chloride flush, sodium chloride, heparin flush, white petrolatum, polyethylene glycol, acetaminophen **OR** acetaminophen, glucose, dextrose, glucagon (rDNA), dextrose, albuterol, labetalol    DATA:    CBC:   Lab Results   Component Value Date    WBC 3.9 04/09/2022    RBC 3.76 04/09/2022    HGB 9.9 04/09/2022    HCT 30.8 04/09/2022    MCV 81.9 04/09/2022    MCH 26.3 04/09/2022    MCHC 32.1 04/09/2022    RDW 18.7 04/09/2022     04/09/2022    MPV 10.1 04/09/2022     CMP:    Lab Results   Component Value Date     04/09/2022    K 3.5 04/09/2022    K 3.7 03/04/2022     04/09/2022    CO2 26 04/09/2022    BUN 10 04/09/2022    CREATININE 3.2 04/09/2022    GFRAA 21 04/09/2022    LABGLOM 21 04/09/2022    GLUCOSE 72 04/09/2022    GLUCOSE 130 05/18/2012    PROT 6.3 04/09/2022    LABALBU 3.2 04/09/2022    LABALBU 4.1 05/18/2012    CALCIUM 9.0 04/09/2022    BILITOT 0.5 04/09/2022    ALKPHOS 135 04/09/2022    AST 10 04/09/2022    ALT 8 04/09/2022     Magnesium:    Lab Results   Component Value Date    MG 2.0 04/09/2022     Phosphorus:    Lab Results   Component Value Date    PHOS 3.0 04/09/2022     Radiology Review:      CT Head WO Contrast March 27, 2022   No acute intracranial abnormality or hemorrhage.         CT Abdomen Pelvis WO Contrast March 27, 2022   1.  Moderate ascites throughout abdomen and pelvis.       2.  Multiple thick walled loops of small bowel throughout the abdomen could   suggest nonspecific infectious or inflammatory enteritis.       3.  Generalized body wall edema.       4.  Small bilateral pleural effusions with adjacent atelectatic changes.             Chest X-Ray March 28, 2022   Infiltrate and or atelectasis at the left lower lobe.  Substantially resolved   infiltrate and or atelectasis on the right.  New NG tube.               BRIEF SUMMARY OF INITIAL CONSULT:    Briefly, Miss Archie Reynoso is a 36year-old female with a PMH of ESRD on hemodialysis 3 days/wk, on TTS schedule at [facility] I DM, poorly controlled with recurrent admissions for DKA, HTN, gastroparesis, ascites, infective endocarditis, cardiac arrest, hypothyroidism, recent Covid, and depression who was admitted on March 27, 2022 after she was found unresponsive at the SNF where she resides. Patient was found to be profoundly hypoglycemic and EMS was called. She was intubated in ER for airway protection as she remained unresponsive. CT head showed no acute intracranial abnormality or hemorrhage, chest x-ray demonstrates right perihilar opacity concerning for aspiration pneumonia. She was ultimately admitted to MICU for further evaluation. Labs on admission were significant for sodium 135, potassium 5.0, chloride 100, bicarbonate 25, BUN 38, creatinine 4.9, proBNP >70,000. We are consulted for dialysis management. Problems resolved. · Hypoglycemia, was on D10, now hyperglycemic with +ketones (beta-hydroxybutyrate >4.5)  · Acute respiratory failure, 2/2 aspiration pneumonia? On 40% Fio2. Extubated 0/23  · Acute metabolic encephalopathy 2/2 hypoglycemia. Resolving   · Acidemia, pH 7.311, PCO2 35.0, HCO3 65.8, metabolic acidosis from DKA    IMPRESSION/RECOMMENDATIONS:      1. ESKD 3 times a week TTS via RIJ tunneled dialysis catheter. HD initiated September 2021 after failed peritoneal dialysis with persistent hyperkalemia. For dialysis three times/wk TTS while inpatient. Seen on HD this morning. 2. HTN, on cardizem 90 mg po qid  3. MBD of CKD, currently with hypophosphatemia. Binders d/c'd  4. Anemia of CKD, continue epoetin alpha 3,000 unit 3 times/wk. s/p transfusion   5. Vitamin D deficiency, on ergocalciferol 1000 po daily  ------------------------------------------------------  6. Type I DM, poorly controlled with frequent readmissions for DKA, on SSI, lantus decreased to 1 unit nightly  7. Aspiration pneumonia? S/p piperacillin    8. Restless leg syndrome, on ropinirole at home  9. Depression, on escitalopram and Abilify  10.  Hypothyroidism, on levothyroxine   11. History of gastroparesis, on metoclopramide at home. For EGD and pyloric dilatation with Botox injection outpatient per GI.  12. Hx recurrent C. difficile infection, on flagyl po Q 8hrs   13. Hx of MDRO UTI  14. Nutrition, diabetic diet  15. DKA  16. Anemia of chronic disease     Plan:     · Continue HD three times/wk, TTS while inpatient.   · Continue epoetin alpha 3,000 units 3 times/wk  · Continue to monitor labs daily   · Continue to monitor glucose levels  · Monitor phosphorus levels   · Monitor H&H, transfuse <7, receiving transfusion today  · Discharge planning    Electronically signed by HEBER Acosta CNP on 4/10/2022 at 8:39 AM

## 2022-04-10 NOTE — PROGRESS NOTES
ENDOCRINOLOGY PROGRESS NOTE  Via Tele-Health Service       Date of admission: 3/27/2022  Date of service: 4/9/2022  Admitting physician: Kate Alexander DO   Primary Care Physician: Luis Enrique Garber MD  Consultant physician: Symone William MD     Reason for the consultation:  DM type 1, labile diabetes     History of Present Illness: The history is provided from medical records, pt sedated intubated     Maria Antonia Farmer is a 36 y.o. old female nursing home resident with PMH of Type I DM, ESRD on PD,HTN,hypothyroidism, infective endocarditis and other listed below admitted to [facility] on 3/27/2022 because of AMS. Endocrine service was consulted for DM management.      subjective   No acute events, BG under good control      Point of care glucose monitoring (Independently reviewed)   Recent Labs     04/08/22  1154 04/08/22  1815 04/08/22  2030 04/09/22  0222 04/09/22  0618 04/09/22  1233 04/09/22  1843 04/09/22  2106   GLUMET 114* 79 133* 89 77 79 104* 125*     Scheduled Meds:   insulin glargine  1 Units SubCUTAneous Nightly    dicyclomine  10 mg Oral TID AC    melatonin  6 mg Oral Nightly    insulin lispro  0-3 Units SubCUTAneous TID WC    sodium chloride flush  5-40 mL IntraVENous 2 times per day    heparin flush  1 mL IntraVENous 2 times per day    dilTIAZem  90 mg Oral Q6H    mirtazapine  15 mg Oral Nightly    escitalopram  10 mg Oral Daily    ARIPiprazole  5 mg Oral Nightly    rOPINIRole  0.25 mg Oral Nightly    levothyroxine  88 mcg Oral Daily    famotidine  10 mg Oral Daily    epoetin nena-epbx  3,000 Units SubCUTAneous Once per day on Mon Wed Fri    Vitamin D  1,000 Units Per NG tube Daily     PRN Meds:   sodium chloride, , PRN  metoclopramide, 5 mg, 4x Daily PRN  sodium chloride, , PRN  ondansetron, 4 mg, Q4H PRN  trimethobenzamide, 200 mg, Q6H PRN  sodium chloride, , PRN  loperamide, 2 mg, 4x Daily PRN  sodium chloride, , PRN  sodium chloride flush, 5-40 mL, PRN  sodium chloride, , PRN  heparin flush, 1 mL, PRN  white petrolatum, , BID PRN  polyethylene glycol, 17 g, Daily PRN  acetaminophen, 650 mg, Q6H PRN   Or  acetaminophen, 650 mg, Q6H PRN  glucose, 15 g, PRN  dextrose, 12.5 g, PRN  glucagon (rDNA), 1 mg, PRN  dextrose, 100 mL/hr, PRN  albuterol, 2.5 mg, Q6H PRN  labetalol, 5 mg, Q6H PRN      Continuous Infusions:   sodium chloride      sodium chloride      sodium chloride      sodium chloride      sodium chloride      dextrose         Review of Systems  Non-obtainable     OBJECTIVE    BP (!) 165/102   Pulse 112   Temp 97.9 °F (36.6 °C) (Oral)   Resp 18   Ht 5' 4\" (1.626 m)   Wt 133 lb 9.6 oz (60.6 kg)   SpO2 97%   BMI 22.93 kg/m²     Intake/Output Summary (Last 24 hours) at 4/9/2022 2121  Last data filed at 4/9/2022 1133  Gross per 24 hour   Intake 435 ml   Output 2600 ml   Net -2165 ml       Physical examination:  Due to this being a TeleHealth encounter, evaluation of the following organ systems is limited: Vitals/Constitutional/EENT/Resp/CV/GI//MS/Neuro/Skin/Heme-Lymph-Imm. Modified physical exam through Telemedicine camera    General: lethrgic  Neck: no obvious neck mass. No obvious neck deformity     CVS: no distress   Chest: no distress.  Chest is moving with respiration    Extremities:  no visible tremor  Skin: No visible rashes as seen from camera   Musculoskeletal: no visible deformity      Review of Laboratory Data:  I personally reviewed the following labs:   Recent Labs     04/07/22  0602 04/07/22  1744 04/09/22  0508   WBC 3.8*  --  3.9*   RBC 2.69*  --  3.76   HGB 6.8* 10.2* 9.9*   HCT 21.8* 32.9* 30.8*   MCV 81.0  --  81.9   MCH 25.3*  --  26.3   MCHC 31.2*  --  32.1   RDW 19.3*  --  18.7*     --  185   MPV 10.1  --  10.1     Recent Labs     04/07/22  0602 04/07/22  0602 04/07/22  1744 04/08/22  0855 04/09/22  0508      < > 139 139 139   K 4.1   < > 4.1 3.8 3.5      < > 104 102 103   CO2 24   < > 23 25 26   BUN 18   < > 5* 7 10   CREATININE 3.4*   < > 1.6* 2.4* 3.2*   GLUCOSE 148*   < > 103* 113* 72*   CALCIUM 8.2*   < > 8.4* 9.0 9.0   PROT 5.8*  --   --   --  6.3*   LABALBU 3.2*  --   --   --  3.2*   BILITOT 0.3  --   --   --  0.5   ALKPHOS 152*  --   --   --  135*   AST 10  --   --   --  10   ALT 12  --   --   --  8    < > = values in this interval not displayed. Beta-Hydroxybutyrate   Date Value Ref Range Status   04/06/2022 >4.50 (H) 0.02 - 0.27 mmol/L Final   03/29/2022 >4.50 (H) 0.02 - 0.27 mmol/L Final   03/27/2022 0.10 0.02 - 0.27 mmol/L Final     Lab Results   Component Value Date    LABA1C 7.7 03/02/2022    LABA1C 8.7 12/08/2021    LABA1C 10.2 11/23/2021     Lab Results   Component Value Date/Time    TSH 5.370 (H) 03/28/2022 01:56 AM    T4FREE 1.10 03/28/2022 01:56 AM    Y4NFTVD 8.6 11/05/2015 02:20 AM    FT3 1.3 (L) 10/31/2020 10:43 AM    L8GBQUN 36.73 (L) 07/20/2020 02:00 PM     Lab Results   Component Value Date    LABA1C 7.7 03/02/2022    GLUCOSE 72 04/09/2022    GLUCOSE 130 05/18/2012    MALBCR 2949.3 01/15/2020    LABMICR 1740.1 01/15/2020    LABCREA 59 01/15/2020    LABCREA 61 01/15/2020     Lab Results   Component Value Date    TRIG 82 01/14/2020    HDL 33 01/14/2020    LDLCALC 46 01/14/2020    CHOL 95 01/14/2020       Blood culture   Lab Results   Component Value Date    BC 5 Days no growth 03/28/2022    BC 5 Days no growth 03/27/2022       Radiology:  XR CHEST PORTABLE   Final Result   1. The lungs are clear. There is no infiltrate or effusion. 2. Stable position of the support lines and tubes. XR CHEST PORTABLE   Final Result   1. There is no acute cardiopulmonary disease   2. Stable position of the support lines and tubes. XR CHEST PORTABLE   Final Result   Infiltrate and or atelectasis at the left lower lobe. Substantially resolved   infiltrate and or atelectasis on the right. New NG tube. CT HEAD WO CONTRAST   Final Result   No acute intracranial abnormality or hemorrhage.          CT ABDOMEN PELVIS WO CONTRAST Additional Contrast? None   Final Result   1. Moderate ascites throughout abdomen and pelvis. 2.  Multiple thick walled loops of small bowel throughout the abdomen could   suggest nonspecific infectious or inflammatory enteritis. 3.  Generalized body wall edema. 4.  Small bilateral pleural effusions with adjacent atelectatic changes. XR ABDOMEN FOR NG/OG/NE TUBE PLACEMENT   Final Result   Enteric tube tip in the body of the stomach. XR CHEST PORTABLE   Final Result   Right perihilar opacity. XR CHEST PORTABLE    (Results Pending)       Medical Records/Labs/Images review:   I personally reviewed and summarized previous records   All labs and imaging were reviewed independently     [address] Maldonado, a 36 y.o.-old female seen today for inpatient diabetes management     Diabetes Mellitus type I    · Extremely insulin sensitive. Pt is type 1 diabetic and needs long acting insulin   · we recommend the following sq insulin regimen   · Lantus ONE units daily at night   · Modified Low dose sliding scale (1u:100>200) with meals  · Continue following BG and adjust insulin regimen    primary Hypothyroidism  · Levothyroxine was adjusted during this admission to 88 mcg daily      ESRD  · Pt at risk for hypoglycemia  · On dialysis, nephrology following    Thank you for allowing us to participate in the care of this patient. Please do not hesitate to contact us with any additional questions. Carissa Elias MD  Endocrinologist, [facility]   [address] [place] [address]   Phone: [phone]  Fax: [phone]  ----------------------------  An electronic signature was used to authenticate this note.  Dhruv Epstein MD on 4/9/2022 at 9:21 PM

## 2022-04-11 ENCOUNTER — APPOINTMENT (OUTPATIENT)
Dept: GENERAL RADIOLOGY | Age: 32
DRG: 420 | End: 2022-04-11
Payer: COMMERCIAL

## 2022-04-11 PROBLEM — E10.10 DIABETIC KETOACIDOSIS WITHOUT COMA ASSOCIATED WITH TYPE 1 DIABETES MELLITUS (HCC): Status: RESOLVED | Noted: 2018-03-18 | Resolved: 2022-04-11

## 2022-04-11 PROBLEM — R41.82 ALTERED MENTAL STATUS: Status: RESOLVED | Noted: 2022-03-27 | Resolved: 2022-04-11

## 2022-04-11 PROBLEM — G93.41 ACUTE METABOLIC ENCEPHALOPATHY: Status: RESOLVED | Noted: 2022-03-28 | Resolved: 2022-04-11

## 2022-04-11 LAB
ALBUMIN SERPL-MCNC: 3.5 G/DL (ref 3.5–5.2)
ALP BLD-CCNC: 156 U/L (ref 35–104)
ALT SERPL-CCNC: 9 U/L (ref 0–32)
ANION GAP SERPL CALCULATED.3IONS-SCNC: 14 MMOL/L (ref 7–16)
AST SERPL-CCNC: 11 U/L (ref 0–31)
BASOPHILS ABSOLUTE: 0.06 E9/L (ref 0–0.2)
BASOPHILS RELATIVE PERCENT: 1.4 % (ref 0–2)
BILIRUB SERPL-MCNC: 0.4 MG/DL (ref 0–1.2)
BILIRUBIN DIRECT: <0.2 MG/DL (ref 0–0.3)
BILIRUBIN, INDIRECT: ABNORMAL MG/DL (ref 0–1)
BUN BLDV-MCNC: 10 MG/DL (ref 6–20)
CALCIUM SERPL-MCNC: 9.2 MG/DL (ref 8.6–10.2)
CHLORIDE BLD-SCNC: 100 MMOL/L (ref 98–107)
CO2: 24 MMOL/L (ref 22–29)
CREAT SERPL-MCNC: 3.3 MG/DL (ref 0.5–1)
EOSINOPHILS ABSOLUTE: 0.23 E9/L (ref 0.05–0.5)
EOSINOPHILS RELATIVE PERCENT: 5.3 % (ref 0–6)
GFR AFRICAN AMERICAN: 20
GFR NON-AFRICAN AMERICAN: 20 ML/MIN/1.73
GLUCOSE BLD-MCNC: 177 MG/DL (ref 74–99)
HCT VFR BLD CALC: 31.1 % (ref 34–48)
HEMOGLOBIN: 9.7 G/DL (ref 11.5–15.5)
IMMATURE GRANULOCYTES #: 0.01 E9/L
IMMATURE GRANULOCYTES %: 0.2 % (ref 0–5)
LYMPHOCYTES ABSOLUTE: 1.17 E9/L (ref 1.5–4)
LYMPHOCYTES RELATIVE PERCENT: 26.7 % (ref 20–42)
MAGNESIUM: 2.1 MG/DL (ref 1.6–2.6)
MCH RBC QN AUTO: 25.9 PG (ref 26–35)
MCHC RBC AUTO-ENTMCNC: 31.2 % (ref 32–34.5)
MCV RBC AUTO: 83.2 FL (ref 80–99.9)
METER GLUCOSE: 123 MG/DL (ref 74–99)
METER GLUCOSE: 124 MG/DL (ref 74–99)
METER GLUCOSE: 128 MG/DL (ref 74–99)
METER GLUCOSE: 184 MG/DL (ref 74–99)
METER GLUCOSE: 191 MG/DL (ref 74–99)
METER GLUCOSE: 195 MG/DL (ref 74–99)
METER GLUCOSE: 208 MG/DL (ref 74–99)
MONOCYTES ABSOLUTE: 0.41 E9/L (ref 0.1–0.95)
MONOCYTES RELATIVE PERCENT: 9.4 % (ref 2–12)
NEUTROPHILS ABSOLUTE: 2.5 E9/L (ref 1.8–7.3)
NEUTROPHILS RELATIVE PERCENT: 57 % (ref 43–80)
PDW BLD-RTO: 18.6 FL (ref 11.5–15)
PHOSPHORUS: 2.3 MG/DL (ref 2.5–4.5)
PLATELET # BLD: 180 E9/L (ref 130–450)
PMV BLD AUTO: 10.1 FL (ref 7–12)
POTASSIUM REFLEX MAGNESIUM: 4.5 MMOL/L (ref 3.5–5)
RBC # BLD: 3.74 E12/L (ref 3.5–5.5)
SODIUM BLD-SCNC: 138 MMOL/L (ref 132–146)
TOTAL PROTEIN: 6.8 G/DL (ref 6.4–8.3)
WBC # BLD: 4.4 E9/L (ref 4.5–11.5)

## 2022-04-11 PROCEDURE — 6370000000 HC RX 637 (ALT 250 FOR IP): Performed by: INTERNAL MEDICINE

## 2022-04-11 PROCEDURE — 2580000003 HC RX 258: Performed by: INTERNAL MEDICINE

## 2022-04-11 PROCEDURE — 36415 COLL VENOUS BLD VENIPUNCTURE: CPT

## 2022-04-11 PROCEDURE — 74018 RADEX ABDOMEN 1 VIEW: CPT

## 2022-04-11 PROCEDURE — 99231 SBSQ HOSP IP/OBS SF/LOW 25: CPT | Performed by: INTERNAL MEDICINE

## 2022-04-11 PROCEDURE — 80076 HEPATIC FUNCTION PANEL: CPT

## 2022-04-11 PROCEDURE — 1200000000 HC SEMI PRIVATE

## 2022-04-11 PROCEDURE — 6360000002 HC RX W HCPCS: Performed by: INTERNAL MEDICINE

## 2022-04-11 PROCEDURE — 6370000000 HC RX 637 (ALT 250 FOR IP): Performed by: STUDENT IN AN ORGANIZED HEALTH CARE EDUCATION/TRAINING PROGRAM

## 2022-04-11 PROCEDURE — 83735 ASSAY OF MAGNESIUM: CPT

## 2022-04-11 PROCEDURE — 2500000003 HC RX 250 WO HCPCS: Performed by: INTERNAL MEDICINE

## 2022-04-11 PROCEDURE — 6370000000 HC RX 637 (ALT 250 FOR IP): Performed by: NURSE PRACTITIONER

## 2022-04-11 PROCEDURE — 99232 SBSQ HOSP IP/OBS MODERATE 35: CPT | Performed by: INTERNAL MEDICINE

## 2022-04-11 PROCEDURE — 85025 COMPLETE CBC W/AUTO DIFF WBC: CPT

## 2022-04-11 PROCEDURE — 80053 COMPREHEN METABOLIC PANEL: CPT

## 2022-04-11 PROCEDURE — 6360000002 HC RX W HCPCS: Performed by: NURSE PRACTITIONER

## 2022-04-11 PROCEDURE — 82962 GLUCOSE BLOOD TEST: CPT

## 2022-04-11 PROCEDURE — 2700000000 HC OXYGEN THERAPY PER DAY

## 2022-04-11 PROCEDURE — 84100 ASSAY OF PHOSPHORUS: CPT

## 2022-04-11 PROCEDURE — S5553 INSULIN LONG ACTING 5 U: HCPCS | Performed by: INTERNAL MEDICINE

## 2022-04-11 RX ORDER — INSULIN GLARGINE-YFGN 100 [IU]/ML
1 INJECTION, SOLUTION SUBCUTANEOUS DAILY
Status: DISCONTINUED | OUTPATIENT
Start: 2022-04-11 | End: 2022-04-13 | Stop reason: SDUPTHER

## 2022-04-11 RX ORDER — ACETAMINOPHEN 325 MG/1
650 TABLET ORAL EVERY 6 HOURS
Status: DISCONTINUED | OUTPATIENT
Start: 2022-04-11 | End: 2022-04-22

## 2022-04-11 RX ORDER — ACETAMINOPHEN 650 MG/1
650 SUPPOSITORY RECTAL EVERY 6 HOURS
Status: DISCONTINUED | OUTPATIENT
Start: 2022-04-11 | End: 2022-04-22

## 2022-04-11 RX ORDER — PROMETHAZINE HYDROCHLORIDE 25 MG/ML
6.25 INJECTION, SOLUTION INTRAMUSCULAR; INTRAVENOUS
Status: DISCONTINUED | OUTPATIENT
Start: 2022-04-11 | End: 2022-04-11

## 2022-04-11 RX ORDER — HYDROXYZINE PAMOATE 25 MG/1
25 CAPSULE ORAL ONCE
Status: COMPLETED | OUTPATIENT
Start: 2022-04-11 | End: 2022-04-11

## 2022-04-11 RX ORDER — METOCLOPRAMIDE 5 MG/1
5 TABLET ORAL 4 TIMES DAILY
Status: DISCONTINUED | OUTPATIENT
Start: 2022-04-11 | End: 2022-04-12

## 2022-04-11 RX ORDER — PROMETHAZINE HYDROCHLORIDE 25 MG/1
25 TABLET ORAL
Status: DISCONTINUED | OUTPATIENT
Start: 2022-04-11 | End: 2022-04-20

## 2022-04-11 RX ADMIN — INSULIN GLARGINE-YFGN 1 UNITS: 100 INJECTION, SOLUTION SUBCUTANEOUS at 11:05

## 2022-04-11 RX ADMIN — DICYCLOMINE HYDROCHLORIDE 10 MG: 10 CAPSULE ORAL at 16:24

## 2022-04-11 RX ADMIN — Medication 6 MG: at 20:55

## 2022-04-11 RX ADMIN — SODIUM PHOSPHATE, MONOBASIC, MONOHYDRATE 10 MMOL: 276; 142 INJECTION, SOLUTION INTRAVENOUS at 16:32

## 2022-04-11 RX ADMIN — SODIUM CHLORIDE, PRESERVATIVE FREE 100 UNITS: 5 INJECTION INTRAVENOUS at 20:54

## 2022-04-11 RX ADMIN — ONDANSETRON 4 MG: 2 INJECTION INTRAMUSCULAR; INTRAVENOUS at 11:05

## 2022-04-11 RX ADMIN — DILTIAZEM HYDROCHLORIDE 90 MG: 30 TABLET, FILM COATED ORAL at 20:54

## 2022-04-11 RX ADMIN — ONDANSETRON 4 MG: 2 INJECTION INTRAMUSCULAR; INTRAVENOUS at 04:44

## 2022-04-11 RX ADMIN — METOCLOPRAMIDE 5 MG: 5 TABLET ORAL at 20:55

## 2022-04-11 RX ADMIN — SODIUM CHLORIDE, PRESERVATIVE FREE 100 UNITS: 5 INJECTION INTRAVENOUS at 10:17

## 2022-04-11 RX ADMIN — DICYCLOMINE HYDROCHLORIDE 10 MG: 10 CAPSULE ORAL at 07:04

## 2022-04-11 RX ADMIN — DILTIAZEM HYDROCHLORIDE 90 MG: 30 TABLET, FILM COATED ORAL at 04:38

## 2022-04-11 RX ADMIN — ONDANSETRON 4 MG: 2 INJECTION INTRAMUSCULAR; INTRAVENOUS at 20:55

## 2022-04-11 RX ADMIN — PROMETHAZINE HYDROCHLORIDE 25 MG: 25 TABLET ORAL at 16:24

## 2022-04-11 RX ADMIN — MIRTAZAPINE 15 MG: 15 TABLET, FILM COATED ORAL at 20:55

## 2022-04-11 RX ADMIN — Medication 10 ML: at 10:16

## 2022-04-11 RX ADMIN — METOCLOPRAMIDE 5 MG: 5 TABLET ORAL at 17:18

## 2022-04-11 RX ADMIN — ESCITALOPRAM 10 MG: 10 TABLET, FILM COATED ORAL at 10:16

## 2022-04-11 RX ADMIN — HYDROXYZINE PAMOATE 25 MG: 25 CAPSULE ORAL at 20:54

## 2022-04-11 RX ADMIN — INSULIN LISPRO 1 UNITS: 100 INJECTION, SOLUTION INTRAVENOUS; SUBCUTANEOUS at 10:58

## 2022-04-11 RX ADMIN — DILTIAZEM HYDROCHLORIDE 90 MG: 30 TABLET, FILM COATED ORAL at 16:24

## 2022-04-11 RX ADMIN — EPOETIN ALFA-EPBX 3000 UNITS: 3000 INJECTION, SOLUTION INTRAVENOUS; SUBCUTANEOUS at 17:18

## 2022-04-11 RX ADMIN — ACETAMINOPHEN 650 MG: 325 TABLET ORAL at 04:44

## 2022-04-11 RX ADMIN — LEVOTHYROXINE SODIUM 88 MCG: 0.09 TABLET ORAL at 07:04

## 2022-04-11 RX ADMIN — ACETAMINOPHEN 650 MG: 325 TABLET ORAL at 23:55

## 2022-04-11 RX ADMIN — ARIPIPRAZOLE 5 MG: 5 TABLET ORAL at 20:55

## 2022-04-11 RX ADMIN — DILTIAZEM HYDROCHLORIDE 90 MG: 30 TABLET, FILM COATED ORAL at 10:16

## 2022-04-11 RX ADMIN — FAMOTIDINE 10 MG: 20 TABLET ORAL at 10:19

## 2022-04-11 RX ADMIN — Medication 1000 UNITS: at 10:16

## 2022-04-11 RX ADMIN — Medication 10 ML: at 20:55

## 2022-04-11 RX ADMIN — DICYCLOMINE HYDROCHLORIDE 10 MG: 10 CAPSULE ORAL at 10:16

## 2022-04-11 RX ADMIN — ROPINIROLE 0.25 MG: 0.25 TABLET, FILM COATED ORAL at 20:54

## 2022-04-11 ASSESSMENT — PAIN DESCRIPTION - LOCATION: LOCATION: ABDOMEN

## 2022-04-11 ASSESSMENT — PAIN DESCRIPTION - FREQUENCY: FREQUENCY: CONTINUOUS

## 2022-04-11 ASSESSMENT — PAIN DESCRIPTION - ONSET: ONSET: ON-GOING

## 2022-04-11 ASSESSMENT — PAIN SCALES - GENERAL
PAINLEVEL_OUTOF10: 9
PAINLEVEL_OUTOF10: 5
PAINLEVEL_OUTOF10: 7

## 2022-04-11 ASSESSMENT — PAIN SCALES - WONG BAKER: WONGBAKER_NUMERICALRESPONSE: 4

## 2022-04-11 ASSESSMENT — PAIN DESCRIPTION - DESCRIPTORS: DESCRIPTORS: ACHING;PRESSURE

## 2022-04-11 ASSESSMENT — PAIN DESCRIPTION - PAIN TYPE: TYPE: ACUTE PAIN

## 2022-04-11 ASSESSMENT — PAIN DESCRIPTION - PROGRESSION: CLINICAL_PROGRESSION: GRADUALLY WORSENING

## 2022-04-11 NOTE — PROGRESS NOTES
Nutrition Note    Consult received for poor intake/appetite >5 days. Noted pt. continues w/ nausea poor intake. Will modify / add additional ONS to regimen; Magic cup once daily to encourage intake. See RD note 4/7 for full assessment. Will continue to follow up as planned 4/13.      Electronically signed by Jama Cruz RD on 4/11/22 at 11:42 AM EDT    Contact: ext [phone]

## 2022-04-11 NOTE — CARE COORDINATION
Here for hypoglycemia. Endocrinology consult. Dietary consult--continues w/ nausea poor intake. Nephrology following eskd on HD. Per prior cm note-spoke to the patent's mother Best. She said she does plan on bringing her daughter home and not return to [employer]. She said she is a Registered Nurse and has been taking care of her daughter prior to her recent stay at [address] [place] [address]. I offered home health care and she declined. Mother said she has all the DME at home she needs: Rolator, ramped entry, BSC, wheelchair and incontinence products. She said she does not have home oxygen. will need amb pulse ox. DME order placed in anticipation of home oxygen need. Referral made to [facility]. Pt goes to HD at [facility] in [place] on T/T/S at 7 AM. Mother said she will call [facility] to set up transportation for dialysis. Mom said she will transport her daughter home at time of discharge. cm/sw to follow. Electronically signed by Isaiah Weaver RN () on 4/11/2022 at 2:37 PM

## 2022-04-11 NOTE — PROGRESS NOTES
[facility]  Internal Medicine Residency / [address]    Attending Physician Statement  I have discussed the case, including pertinent history and exam findings with the resident and the team.  I have seen and examined the patient and the key elements of the encounter have been performed by me. I agree with the assessment, plan and orders as documented by the resident. Principal Problem:    Hypoglycemia  Active Problems:    End stage renal disease (HCC)    Gastroparesis  Resolved Problems:    Altered mental status    Acute metabolic encephalopathy    Continue monitor glucose with insulin dosing per endocrinology. Consider check for insulin receptor ab given insulin sensitivity. Will discuss with cyndee VANEGAS today with possible escalation in bowel regimen. Restart Reglan (last gastric emptying study 2021 reviewed) and pre-med for nausea with phenergan. Check EKG tomorrow AM for QTc. Likely stop Bentyl trial tomorrow. Remainder of medical problems as per resident note.       Torey Farias, DO  Internal Medicine Residency Faculty

## 2022-04-11 NOTE — PROGRESS NOTES
Department of Internal Medicine  Nephrology Progress Note      Events reviewed. SUBJECTIVE:  We are following Miss Lennox Shelling for ESKD on HD. Patient reports feeling better today, less nausea.     PHYSICAL EXAM:      Vitals:    VITALS:  BP (!) 133/92   Pulse 91   Temp 97.9 °F (36.6 °C) (Oral)   Resp 16   Ht 5' 4\" (1.626 m)   Wt 133 lb 9.6 oz (60.6 kg)   SpO2 96%   BMI 22.93 kg/m²   24HR INTAKE/OUTPUT:      Intake/Output Summary (Last 24 hours) at 4/11/2022 1321  Last data filed at 4/11/2022 6114  Gross per 24 hour   Intake 150 ml   Output --   Net 150 ml       Access: RIJ tunneled dialysis catheter  Constitutional: Awake, alert, NAD  HEENT:  PERRL, normocephalic, atraumatic  Respiratory:  CTA bilateral  Cardiovascular/Edema:  ST, RRR, no murmur gallop or rub  Gastrointestinal:  abd distended, c/o persistent abdominal pain  Neurologic: A&OX3 follows commands, no focal deficit  Skin:  Warm, dry, ecchymotic areas to abdomen    Other:    Scheduled Meds:   insulin glargine  1 Units SubCUTAneous Daily    GI cocktail   Oral Once    dicyclomine  10 mg Oral TID AC    melatonin  6 mg Oral Nightly    insulin lispro  0-3 Units SubCUTAneous TID WC    sodium chloride flush  5-40 mL IntraVENous 2 times per day    heparin flush  1 mL IntraVENous 2 times per day    dilTIAZem  90 mg Oral Q6H    mirtazapine  15 mg Oral Nightly    escitalopram  10 mg Oral Daily    ARIPiprazole  5 mg Oral Nightly    rOPINIRole  0.25 mg Oral Nightly    levothyroxine  88 mcg Oral Daily    famotidine  10 mg Oral Daily    epoetin nena-epbx  3,000 Units SubCUTAneous Once per day on Mon Wed Fri    Vitamin D  1,000 Units Per NG tube Daily     Continuous Infusions:   sodium chloride      sodium chloride      sodium chloride      sodium chloride      sodium chloride      dextrose       PRN Meds:.sodium chloride, metoclopramide, sodium chloride, ondansetron, trimethobenzamide, sodium chloride, loperamide, sodium chloride, sodium controlled with recurrent admissions for DKA, HTN, gastroparesis, ascites, infective endocarditis, cardiac arrest, hypothyroidism, recent Covid, and depression who was admitted on March 27, 2022 after she was found unresponsive at the SNF where she resides. Patient was found to be profoundly hypoglycemic and EMS was called. She was intubated in ER for airway protection as she remained unresponsive. CT head showed no acute intracranial abnormality or hemorrhage, chest x-ray demonstrates right perihilar opacity concerning for aspiration pneumonia. She was ultimately admitted to MICU for further evaluation. Labs on admission were significant for sodium 135, potassium 5.0, chloride 100, bicarbonate 25, BUN 38, creatinine 4.9, proBNP >70,000. We are consulted for dialysis management. Problems resolved. · Hypoglycemia, was on D10, now hyperglycemic with +ketones (beta-hydroxybutyrate >4.5)  · Acute respiratory failure, 2/2 aspiration pneumonia? On 40% Fio2. Extubated 5/58  · Acute metabolic encephalopathy 2/2 hypoglycemia. Resolving   · Acidemia, pH 7.311, PCO2 35.0, HCO3 75.6, metabolic acidosis from DKA  · Hx recurrent C. difficile infection, on flagyl po Q 8hrs   · Hx of MDRO UTI    IMPRESSION/RECOMMENDATIONS:      1. ESKD 3 times a week TTS via RIJ tunneled dialysis catheter. 2. HTN, on cardizem 90 mg po qid  3. MBD of CKD, currently with hypophosphatemia. Binders d/c'd, to replace. 4. Anemia of CKD, continue epoetin alpha 3,000 unit 3 times/wk. s/p transfusion   5. Vitamin D deficiency, on ergocalciferol 1000 po daily  ------------------------------------------------------  6. Type I DM, poorly controlled with frequent readmissions for DKA, on SSI, lantus decreased to 1 unit nightly  7. Aspiration pneumonia? S/p piperacillin    8. Restless leg syndrome, on ropinirole at home  9. Depression, on escitalopram and Abilify  10. Hypothyroidism, on levothyroxine   11.  History of gastroparesis, on metoclopramide at home.  For EGD and pyloric dilatation with Botox injection outpatient per GI.       Plan:     · Continue HD three times/wk, TTS while inpatient.   · Continue epoetin alpha 3,000 units 3 times/wk  · Continue to monitor labs daily   · Continue to monitor glucose levels  · Monitor phosphorus levels   · Replace phosphorus      Electronically signed by Zuhair Tompkins MD on 4/11/2022 at 1:21 PM

## 2022-04-11 NOTE — PROGRESS NOTES
[facility]  Internal Medicine Residency Program  Progress Note - House Team 2    Patient:  Shania French 36 y.o. female MRN: [id number]     Date of Service: 4/11/2022     CC: AMS   Overnight events: NAEO     Subjective      On day 15 of hospital admission    Patient seen at bedside this AM   Patient resting comfortably and mildly lethargic when seen but states that she has mild nausea and abdominal pain   Lantus was held yesterday in concerns of low BG, but today 184 -> 195    Objective     Physical Exam:  · Vitals: BP (!) 133/92   Pulse 91   Temp 97.9 °F (36.6 °C) (Oral)   Resp 16   Ht 5' 4\" (1.626 m)   Wt 133 lb 9.6 oz (60.6 kg)   SpO2 96%   BMI 22.93 kg/m²       · Constitutional: Awake, alert, able to answer questions. Follows commands. In no apparent distress. · Head: Normocephalic and atraumatic. · Eyes: EOMI, conjunctiva normal, (-) scleral icterus. Mucus membranes moist.  · Mouth: Mucus membranes moist. Oropharynx clear. No deviation of the tongue or uvula. Normal dentition. · Neck: (-)  swelling, (-) JVD, trachea midline  · Respiratory: Lungs clear to auscultation bilaterally. (-)  wheezes, (-)  rales, (-)  rhonchi. No increased work of breathing or respiratory distress  · Cardiovascular: RRR. (-)  murmurs, (-) gallops,  (-) rubs. S1 and S2 were normal.   · GI:  Abdomen soft, non-tender, non-distended. (+) BS. (-) guarding, (-) rigidity. · Extremities: Warm and well perfused. (-) clubbing, (-) cyanosis. 2+ distal pulses. (-) peripheral edema. (-)Sacral pitting edema. · Neurologic:  No focal neurological deficits.   No tremor or overt seizure activity    Pertinent Labs & Imaging Studies   jorge alberto  CBC with Differential:    Lab Results   Component Value Date    WBC 4.4 04/11/2022    RBC 3.74 04/11/2022    HGB 9.7 04/11/2022    HCT 31.1 04/11/2022     04/11/2022    MCV 83.2 04/11/2022    MCH 25.9 04/11/2022    MCHC 31.2 04/11/2022    RDW 18.6 04/11/2022    NRBC 0.9 03/28/2022    SEGSPCT 67 06/27/2013    METASPCT 1.7 08/10/2020    LYMPHOPCT 26.7 04/11/2022    MONOPCT 9.4 04/11/2022    MYELOPCT 0.9 01/19/2022    BASOPCT 1.4 04/11/2022    MONOSABS 0.41 04/11/2022    LYMPHSABS 1.17 04/11/2022    EOSABS 0.23 04/11/2022    BASOSABS 0.06 04/11/2022     CMP:    Lab Results   Component Value Date     04/11/2022    K 4.5 04/11/2022     04/11/2022    CO2 24 04/11/2022    BUN 10 04/11/2022    CREATININE 3.3 04/11/2022    GFRAA 20 04/11/2022    LABGLOM 20 04/11/2022    GLUCOSE 177 04/11/2022    GLUCOSE 130 05/18/2012    PROT 6.8 04/11/2022    LABALBU 3.5 04/11/2022    LABALBU 4.1 05/18/2012    CALCIUM 9.2 04/11/2022    BILITOT 0.4 04/11/2022    ALKPHOS 156 04/11/2022    AST 11 04/11/2022    ALT 9 04/11/2022     Magnesium:    Lab Results   Component Value Date    MG 2.1 04/11/2022     Phosphorus:    Lab Results   Component Value Date    PHOS 2.3 04/11/2022     Troponin:    Lab Results   Component Value Date    TROPONINI 0.12 05/14/2021     Resident's Assessment and Plan     Assessment     1. Nausea/Vomiting w/ Abdominal pain with Hx of gastroparesis 2/2 Hx of IDDM  2. HTN  3. ESRD on HD TTS  4. IDDM1-A1c 7.7% this admission  5. Hypothyroidism-TSH 5.37, FT4 1.10 normal  6. Acute on chronic anemia-s/p 3 unit PRBCs, chronic component likely due to ESRD  7. History of MDRO UTI  8. History of C. difficile infection    Resolved/Stable Problems:   · Acute hypoxic respiratory failure   · Acute metabolic encephalopathy-likely due to hypoglycemia in setting of DM1 and ESRD (CTH negative, negative UDS/SDS, NH3 normal, B12 normal 1 month ago)  · Aspiration pneumonia     Plan   Pepcid, Zofran and Bentyl to aid w/ abdominal pain/dyspepsia.  Per chart review patient seen to be on Reglan at home and patient on Reglan 5mg ACHS.  Per Endocrine, continue on 1U Lantus nightly w/ modified SSI. Isaiah Gates w/ Endocrine - Dr. Truong Hernandez.  Plan to continue f/u as outpatient when pt discharged. o Per Endo, upon discharge, patient to be on Lantus 1u nighty with current sliding scale with meals.  Patient is NOT to be on bedtime sliding scale   Continue Synthroid 88 mcg daily   HD TThS per nephro   Continue Abilify and Celexa   Glucose checks q4hrs   Appreciate Endo, nephro, recommendations   Dietician consulted with plans to count calories    PT/OT evaluation: Not indicated at this time  DVT prophylaxis/ GI prophylaxis: Heparin/Pepcid  Disposition: Continue current care    Bonnie Zarco MD, PGY-1  Attending physician: Dr. Elver Olson

## 2022-04-11 NOTE — PLAN OF CARE
Problem: Skin Integrity:  Goal: Will show no infection signs and symptoms  Description: Will show no infection signs and symptoms  4/11/2022 1137 by Travis Lin RN  Outcome: Met This Shift  4/11/2022 0323 by Jovita Muniz RN  Outcome: Met This Shift  Goal: Absence of new skin breakdown  Description: Absence of new skin breakdown  4/11/2022 1137 by Travis Lin RN  Outcome: Met This Shift  4/11/2022 0323 by Jovita Muniz RN  Outcome: Met This Shift     Problem: Serum Glucose Level - Abnormal:  Goal: Ability to maintain appropriate glucose levels has stabilized  Description: Ability to maintain appropriate glucose levels has stabilized  4/11/2022 1137 by Travis Lin RN  Outcome: Met This Shift  4/11/2022 0323 by Jovita Muniz RN  Outcome: Met This Shift     Problem: Breathing Pattern - Ineffective:  Goal: Ability to achieve and maintain a regular respiratory rate will improve  Description: Ability to achieve and maintain a regular respiratory rate will improve  Outcome: Met This Shift     Problem: Cardiac Output - Decreased:  Goal: Hemodynamic stability will improve  Description: Hemodynamic stability will improve  Outcome: Met This Shift     Problem: Diarrhea:  Goal: Bowel elimination is within specified parameters  Description: Bowel elimination is within specified parameters  Outcome: Met This Shift  Goal: Passage of soft, formed stool  Description: Passage of soft, formed stool  4/11/2022 1137 by Travis Lin RN  Outcome: Met This Shift  4/11/2022 0323 by Jovita Muniz RN  Outcome: Not Met This Shift  Goal: Establishment of normal bowel function will improve to within specified parameters  Description: Establishment of normal bowel function will improve to within specified parameters  Outcome: Met This Shift     Problem: Pain:  Goal: Pain level will decrease  Description: Pain level will decrease  4/11/2022 1137 by Travis Lin RN  Outcome: Met This Shift  4/11/2022 0323 by Jovita Muniz RN  Outcome: Not Met This Shift  Goal: Control of acute pain  Description: Control of acute pain  Outcome: Met This Shift  Goal: Control of chronic pain  Description: Control of chronic pain  Outcome: Met This Shift

## 2022-04-12 LAB
ALBUMIN SERPL-MCNC: 3.5 G/DL (ref 3.5–5.2)
ALP BLD-CCNC: 159 U/L (ref 35–104)
ALT SERPL-CCNC: 7 U/L (ref 0–32)
ANION GAP SERPL CALCULATED.3IONS-SCNC: 13 MMOL/L (ref 7–16)
AST SERPL-CCNC: 10 U/L (ref 0–31)
BASOPHILS ABSOLUTE: 0.04 E9/L (ref 0–0.2)
BASOPHILS RELATIVE PERCENT: 0.9 % (ref 0–2)
BILIRUB SERPL-MCNC: 0.4 MG/DL (ref 0–1.2)
BILIRUBIN DIRECT: <0.2 MG/DL (ref 0–0.3)
BILIRUBIN, INDIRECT: ABNORMAL MG/DL (ref 0–1)
BUN BLDV-MCNC: 14 MG/DL (ref 6–20)
CALCIUM SERPL-MCNC: 9.3 MG/DL (ref 8.6–10.2)
CHLORIDE BLD-SCNC: 104 MMOL/L (ref 98–107)
CO2: 23 MMOL/L (ref 22–29)
CREAT SERPL-MCNC: 4.2 MG/DL (ref 0.5–1)
EOSINOPHILS ABSOLUTE: 0.18 E9/L (ref 0.05–0.5)
EOSINOPHILS RELATIVE PERCENT: 4.1 % (ref 0–6)
GFR AFRICAN AMERICAN: 15
GFR NON-AFRICAN AMERICAN: 15 ML/MIN/1.73
GLUCOSE BLD-MCNC: 29 MG/DL (ref 74–99)
HCT VFR BLD CALC: 31.9 % (ref 34–48)
HEMOGLOBIN: 10 G/DL (ref 11.5–15.5)
IMMATURE GRANULOCYTES #: 0.01 E9/L
IMMATURE GRANULOCYTES %: 0.2 % (ref 0–5)
LYMPHOCYTES ABSOLUTE: 1.25 E9/L (ref 1.5–4)
LYMPHOCYTES RELATIVE PERCENT: 28.2 % (ref 20–42)
MAGNESIUM: 2.2 MG/DL (ref 1.6–2.6)
MCH RBC QN AUTO: 26 PG (ref 26–35)
MCHC RBC AUTO-ENTMCNC: 31.3 % (ref 32–34.5)
MCV RBC AUTO: 82.9 FL (ref 80–99.9)
METER GLUCOSE: 183 MG/DL (ref 74–99)
METER GLUCOSE: 236 MG/DL (ref 74–99)
METER GLUCOSE: 78 MG/DL (ref 74–99)
METER GLUCOSE: 86 MG/DL (ref 74–99)
METER GLUCOSE: 86 MG/DL (ref 74–99)
METER GLUCOSE: 91 MG/DL (ref 74–99)
METER GLUCOSE: 95 MG/DL (ref 74–99)
METER GLUCOSE: <40 MG/DL (ref 74–99)
MONOCYTES ABSOLUTE: 0.31 E9/L (ref 0.1–0.95)
MONOCYTES RELATIVE PERCENT: 7 % (ref 2–12)
NEUTROPHILS ABSOLUTE: 2.64 E9/L (ref 1.8–7.3)
NEUTROPHILS RELATIVE PERCENT: 59.6 % (ref 43–80)
PDW BLD-RTO: 18.7 FL (ref 11.5–15)
PHOSPHORUS: 3.2 MG/DL (ref 2.5–4.5)
PLATELET # BLD: 202 E9/L (ref 130–450)
PMV BLD AUTO: 10.3 FL (ref 7–12)
POTASSIUM REFLEX MAGNESIUM: 4.3 MMOL/L (ref 3.5–5)
RBC # BLD: 3.85 E12/L (ref 3.5–5.5)
SODIUM BLD-SCNC: 140 MMOL/L (ref 132–146)
TOTAL PROTEIN: 7.2 G/DL (ref 6.4–8.3)
WBC # BLD: 4.4 E9/L (ref 4.5–11.5)

## 2022-04-12 PROCEDURE — 99232 SBSQ HOSP IP/OBS MODERATE 35: CPT | Performed by: INTERNAL MEDICINE

## 2022-04-12 PROCEDURE — 2500000003 HC RX 250 WO HCPCS: Performed by: INTERNAL MEDICINE

## 2022-04-12 PROCEDURE — 83735 ASSAY OF MAGNESIUM: CPT

## 2022-04-12 PROCEDURE — 6360000002 HC RX W HCPCS: Performed by: INTERNAL MEDICINE

## 2022-04-12 PROCEDURE — 6370000000 HC RX 637 (ALT 250 FOR IP): Performed by: INTERNAL MEDICINE

## 2022-04-12 PROCEDURE — 82962 GLUCOSE BLOOD TEST: CPT

## 2022-04-12 PROCEDURE — 85025 COMPLETE CBC W/AUTO DIFF WBC: CPT

## 2022-04-12 PROCEDURE — 1200000000 HC SEMI PRIVATE

## 2022-04-12 PROCEDURE — 90935 HEMODIALYSIS ONE EVALUATION: CPT

## 2022-04-12 PROCEDURE — 36415 COLL VENOUS BLD VENIPUNCTURE: CPT

## 2022-04-12 PROCEDURE — 6370000000 HC RX 637 (ALT 250 FOR IP): Performed by: STUDENT IN AN ORGANIZED HEALTH CARE EDUCATION/TRAINING PROGRAM

## 2022-04-12 PROCEDURE — 2580000003 HC RX 258: Performed by: INTERNAL MEDICINE

## 2022-04-12 PROCEDURE — 84100 ASSAY OF PHOSPHORUS: CPT

## 2022-04-12 PROCEDURE — 80076 HEPATIC FUNCTION PANEL: CPT

## 2022-04-12 PROCEDURE — 80053 COMPREHEN METABOLIC PANEL: CPT

## 2022-04-12 PROCEDURE — 6370000000 HC RX 637 (ALT 250 FOR IP): Performed by: NURSE PRACTITIONER

## 2022-04-12 RX ORDER — DIPHENHYDRAMINE HYDROCHLORIDE 50 MG/ML
25 INJECTION INTRAMUSCULAR; INTRAVENOUS ONCE
Status: COMPLETED | OUTPATIENT
Start: 2022-04-12 | End: 2022-04-12

## 2022-04-12 RX ORDER — DIPHENHYDRAMINE HCL 25 MG
25 TABLET ORAL EVERY 6 HOURS PRN
Status: DISCONTINUED | OUTPATIENT
Start: 2022-04-12 | End: 2022-04-14

## 2022-04-12 RX ORDER — METOCLOPRAMIDE 10 MG/1
10 TABLET ORAL 4 TIMES DAILY
Status: DISCONTINUED | OUTPATIENT
Start: 2022-04-12 | End: 2022-04-14

## 2022-04-12 RX ADMIN — METOCLOPRAMIDE 10 MG: 5 TABLET ORAL at 16:43

## 2022-04-12 RX ADMIN — Medication 1000 UNITS: at 13:30

## 2022-04-12 RX ADMIN — DICYCLOMINE HYDROCHLORIDE 10 MG: 10 CAPSULE ORAL at 16:43

## 2022-04-12 RX ADMIN — PROMETHAZINE HYDROCHLORIDE 25 MG: 25 TABLET ORAL at 05:45

## 2022-04-12 RX ADMIN — SODIUM CHLORIDE, PRESERVATIVE FREE 100 UNITS: 5 INJECTION INTRAVENOUS at 13:30

## 2022-04-12 RX ADMIN — ACETAMINOPHEN 650 MG: 325 TABLET ORAL at 23:08

## 2022-04-12 RX ADMIN — PROMETHAZINE HYDROCHLORIDE 25 MG: 25 TABLET ORAL at 13:29

## 2022-04-12 RX ADMIN — DILTIAZEM HYDROCHLORIDE 90 MG: 30 TABLET, FILM COATED ORAL at 03:21

## 2022-04-12 RX ADMIN — DILTIAZEM HYDROCHLORIDE 300 MG: 180 CAPSULE, COATED, EXTENDED RELEASE ORAL at 13:29

## 2022-04-12 RX ADMIN — LOPERAMIDE HYDROCHLORIDE 2 MG: 2 CAPSULE ORAL at 20:10

## 2022-04-12 RX ADMIN — DIPHENHYDRAMINE HYDROCHLORIDE 25 MG: 50 INJECTION, SOLUTION INTRAMUSCULAR; INTRAVENOUS at 23:08

## 2022-04-12 RX ADMIN — ARIPIPRAZOLE 5 MG: 5 TABLET ORAL at 21:27

## 2022-04-12 RX ADMIN — ESCITALOPRAM 10 MG: 10 TABLET, FILM COATED ORAL at 13:29

## 2022-04-12 RX ADMIN — Medication 12.5 G: at 05:33

## 2022-04-12 RX ADMIN — ACETAMINOPHEN 650 MG: 325 TABLET ORAL at 13:29

## 2022-04-12 RX ADMIN — MIRTAZAPINE 15 MG: 15 TABLET, FILM COATED ORAL at 21:27

## 2022-04-12 RX ADMIN — DICYCLOMINE HYDROCHLORIDE 10 MG: 10 CAPSULE ORAL at 05:45

## 2022-04-12 RX ADMIN — ONDANSETRON 4 MG: 2 INJECTION INTRAMUSCULAR; INTRAVENOUS at 16:44

## 2022-04-12 RX ADMIN — METOCLOPRAMIDE 10 MG: 5 TABLET ORAL at 21:27

## 2022-04-12 RX ADMIN — DIPHENHYDRAMINE HYDROCHLORIDE 25 MG: 25 TABLET ORAL at 20:10

## 2022-04-12 RX ADMIN — SODIUM CHLORIDE, PRESERVATIVE FREE 100 UNITS: 5 INJECTION INTRAVENOUS at 21:27

## 2022-04-12 RX ADMIN — METOCLOPRAMIDE 5 MG: 5 TABLET ORAL at 13:29

## 2022-04-12 RX ADMIN — Medication 10 ML: at 21:27

## 2022-04-12 RX ADMIN — FAMOTIDINE 10 MG: 20 TABLET ORAL at 13:29

## 2022-04-12 RX ADMIN — LEVOTHYROXINE SODIUM 88 MCG: 0.09 TABLET ORAL at 05:45

## 2022-04-12 RX ADMIN — ACETAMINOPHEN 650 MG: 325 TABLET ORAL at 16:43

## 2022-04-12 RX ADMIN — DICYCLOMINE HYDROCHLORIDE 10 MG: 10 CAPSULE ORAL at 13:30

## 2022-04-12 RX ADMIN — ROPINIROLE 0.25 MG: 0.25 TABLET, FILM COATED ORAL at 21:27

## 2022-04-12 RX ADMIN — Medication 10 ML: at 13:30

## 2022-04-12 RX ADMIN — DEXTROSE MONOHYDRATE 100 ML/HR: 50 INJECTION, SOLUTION INTRAVENOUS at 05:23

## 2022-04-12 RX ADMIN — ACETAMINOPHEN 650 MG: 325 TABLET ORAL at 05:45

## 2022-04-12 RX ADMIN — Medication 6 MG: at 21:27

## 2022-04-12 ASSESSMENT — PAIN DESCRIPTION - PAIN TYPE
TYPE: ACUTE PAIN
TYPE: ACUTE PAIN

## 2022-04-12 ASSESSMENT — PAIN SCALES - GENERAL
PAINLEVEL_OUTOF10: 0
PAINLEVEL_OUTOF10: 7
PAINLEVEL_OUTOF10: 7
PAINLEVEL_OUTOF10: 4
PAINLEVEL_OUTOF10: 8

## 2022-04-12 ASSESSMENT — PAIN DESCRIPTION - LOCATION
LOCATION: ABDOMEN
LOCATION: ABDOMEN

## 2022-04-12 NOTE — PLAN OF CARE
Problem: Skin Integrity:  Goal: Will show no infection signs and symptoms  Description: Will show no infection signs and symptoms  4/12/2022 1419 by Sarika Rogers  Outcome: Met This Shift     Problem: Skin Integrity:  Goal: Absence of new skin breakdown  Description: Absence of new skin breakdown  4/12/2022 1419 by Sarika Rogers  Outcome: Met This Shift     Problem: Serum Glucose Level - Abnormal:  Goal: Ability to maintain appropriate glucose levels has stabilized  Description: Ability to maintain appropriate glucose levels has stabilized  4/12/2022 1419 by Sarika Rogers  Outcome: Met This Shift     Problem: Cardiac Output - Decreased:  Goal: Hemodynamic stability will improve  Description: Hemodynamic stability will improve  4/12/2022 1419 by Sarika Rogers  Outcome: Met This Shift     Problem: Pain:  Goal: Pain level will decrease  Description: Pain level will decrease  4/12/2022 1419 by Sarika Rogers  Outcome: Met This Shift

## 2022-04-12 NOTE — PLAN OF CARE
I received a [employer] at 17:28 stating patient's mother would like someone to call her. I called patient's mother Swapna Mclaughlin however the connection was bad and every other word was being cut off. I informed Ms. Aviles of the situation and asked if I could call her back. She was agreeable. I called Ms. Aviles twice but was deferred to voicemail both times.

## 2022-04-12 NOTE — PROGRESS NOTES
Notified Dr. Cristin Doe of patients low blood sugar this morning. Per Dr menard to hold morning Lantus.

## 2022-04-12 NOTE — PROGRESS NOTES
ENDOCRINOLOGY PROGRESS NOTE  Via Tele-Health Service       Date of admission: 3/27/2022  Date of service: 4/12/2022  Admitting physician: John Lopez DO   Primary Care Physician: Jarvis Cam MD  Consultant physician: Rashawn Branham MD     Reason for the consultation:  DM type 1, labile diabetes     History of Present Illness: The history is provided from medical records, pt sedated intubated     Jp Jiménez is a 36 y.o. old female nursing home resident with PMH of Type I DM, ESRD on PD,HTN,hypothyroidism, infective endocarditis and other listed below admitted to [facility] on 3/27/2022 because of AMS. Endocrine service was consulted for DM management. subjective   BG dropped t 29 last night.  appetite very poor     Point of care glucose monitoring (Independently reviewed)   Recent Labs     04/11/22  1856 04/11/22  2005 04/12/22  0517 04/12/22  0532 04/12/22  0651 04/12/22  0807 04/12/22  1314 04/12/22  1651   GLUMET 123* 128* <40* 78 86 86 91 95     Scheduled Meds:   dilTIAZem  300 mg Oral Daily    metoclopramide  10 mg Oral 4x Daily    insulin glargine  1 Units SubCUTAneous Daily    promethazine  25 mg Oral TID AC    acetaminophen  650 mg Oral Q6H    Or    acetaminophen  650 mg Rectal Q6H    dicyclomine  10 mg Oral TID AC    melatonin  6 mg Oral Nightly    insulin lispro  0-3 Units SubCUTAneous TID WC    sodium chloride flush  5-40 mL IntraVENous 2 times per day    heparin flush  1 mL IntraVENous 2 times per day    mirtazapine  15 mg Oral Nightly    escitalopram  10 mg Oral Daily    ARIPiprazole  5 mg Oral Nightly    rOPINIRole  0.25 mg Oral Nightly    levothyroxine  88 mcg Oral Daily    famotidine  10 mg Oral Daily    epoetin nena-epbx  3,000 Units SubCUTAneous Once per day on Mon Wed Fri    Vitamin D  1,000 Units Per NG tube Daily     PRN Meds:   magic (miracle) mouthwash, 5 mL, 4x Daily PRN  diphenhydrAMINE, 25 mg, Q6H PRN  sodium chloride, , PRN  sodium chloride, , PRN  ondansetron, 4 mg, Q4H PRN  trimethobenzamide, 200 mg, Q6H PRN  sodium chloride, , PRN  loperamide, 2 mg, 4x Daily PRN  sodium chloride, , PRN  sodium chloride flush, 5-40 mL, PRN  sodium chloride, , PRN  heparin flush, 1 mL, PRN  white petrolatum, , BID PRN  polyethylene glycol, 17 g, Daily PRN  glucose, 15 g, PRN  dextrose, 12.5 g, PRN  glucagon (rDNA), 1 mg, PRN  dextrose, 100 mL/hr, PRN  albuterol, 2.5 mg, Q6H PRN  labetalol, 5 mg, Q6H PRN      Continuous Infusions:   sodium chloride      sodium chloride      sodium chloride      sodium chloride      sodium chloride      dextrose 100 mL/hr (04/12/22 0523)       Review of Systems  Non-obtainable     OBJECTIVE    BP (!) 138/93   Pulse 105   Temp 98 °F (36.7 °C) (Oral)   Resp 18   Ht 5' 4\" (1.626 m)   Wt 133 lb 9.6 oz (60.6 kg)   SpO2 95%   BMI 22.93 kg/m²     Intake/Output Summary (Last 24 hours) at 4/12/2022 1923  Last data filed at 4/12/2022 1842  Gross per 24 hour   Intake 1448 ml   Output 2500 ml   Net -1052 ml     Physical examination:  Due to this being a TeleHealth encounter, evaluation of the following organ systems is limited: Vitals/Constitutional/EENT/Resp/CV/GI//MS/Neuro/Skin/Heme-Lymph-Imm. Modified physical exam through Telemedicine camera    General: sleepy   Neck: no obvious neck mass. No obvious neck deformity     CVS: no distress   Chest: no distress.  Chest is moving with respiration    Extremities:  no visible tremor  Skin: No visible rashes      Review of Laboratory Data:  I personally reviewed the following labs:   Recent Labs     04/11/22 0451 04/12/22 0453   WBC 4.4* 4.4*   RBC 3.74 3.85   HGB 9.7* 10.0*   HCT 31.1* 31.9*   MCV 83.2 82.9   MCH 25.9* 26.0   MCHC 31.2* 31.3*   RDW 18.6* 18.7*    202   MPV 10.1 10.3     Recent Labs     04/11/22  0451 04/12/22  0453    140   K 4.5 4.3    104   CO2 24 23   BUN 10 14   CREATININE 3.3* 4.2*   GLUCOSE 177* 29*   CALCIUM 9.2 9.3   PROT 6.8 7.2   LABALBU 3.5 3. 5   BILITOT 0.4 0.4   ALKPHOS 156* 159*   AST 11 10   ALT 9 7     Beta-Hydroxybutyrate   Date Value Ref Range Status   04/06/2022 >4.50 (H) 0.02 - 0.27 mmol/L Final   03/29/2022 >4.50 (H) 0.02 - 0.27 mmol/L Final   03/27/2022 0.10 0.02 - 0.27 mmol/L Final     Lab Results   Component Value Date    LABA1C 7.7 03/02/2022    LABA1C 8.7 12/08/2021    LABA1C 10.2 11/23/2021     Lab Results   Component Value Date/Time    TSH 5.370 (H) 03/28/2022 01:56 AM    T4FREE 1.10 03/28/2022 01:56 AM    S8YOAIH 8.6 11/05/2015 02:20 AM    FT3 1.3 (L) 10/31/2020 10:43 AM    Q8WEKRT 36.73 (L) 07/20/2020 02:00 PM     Lab Results   Component Value Date    LABA1C 7.7 03/02/2022    GLUCOSE 29 04/12/2022    GLUCOSE 130 05/18/2012    MALBCR 2949.3 01/15/2020    LABMICR 1740.1 01/15/2020    LABCREA 59 01/15/2020    LABCREA 61 01/15/2020     Lab Results   Component Value Date    TRIG 82 01/14/2020    HDL 33 01/14/2020    LDLCALC 46 01/14/2020    CHOL 95 01/14/2020       Blood culture   Lab Results   Component Value Date    BC 5 Days no growth 03/28/2022    BC 5 Days no growth 03/27/2022       Radiology:  XR ABDOMEN (KUB) (SINGLE AP VIEW)   Final Result   No evidence of bowel obstruction. XR CHEST PORTABLE   Final Result   1. The lungs are clear. There is no infiltrate or effusion. 2. Stable position of the support lines and tubes. XR CHEST PORTABLE   Final Result   1. There is no acute cardiopulmonary disease   2. Stable position of the support lines and tubes. XR CHEST PORTABLE   Final Result   Infiltrate and or atelectasis at the left lower lobe. Substantially resolved   infiltrate and or atelectasis on the right. New NG tube. CT HEAD WO CONTRAST   Final Result   No acute intracranial abnormality or hemorrhage. CT ABDOMEN PELVIS WO CONTRAST Additional Contrast? None   Final Result   1. Moderate ascites throughout abdomen and pelvis.       2.  Multiple thick walled loops of small bowel throughout the abdomen could   suggest nonspecific infectious or inflammatory enteritis. 3.  Generalized body wall edema. 4.  Small bilateral pleural effusions with adjacent atelectatic changes. XR ABDOMEN FOR NG/OG/NE TUBE PLACEMENT   Final Result   Enteric tube tip in the body of the stomach. XR CHEST PORTABLE   Final Result   Right perihilar opacity. XR CHEST PORTABLE    (Results Pending)       Medical Records/Labs/Images review:   I personally reviewed and summarized previous records   All labs and imaging were reviewed independently     Mary Maldonado, a 36 y.o.-old female seen today for inpatient diabetes management     Diabetes Mellitus type I    · Hypoglycemia continued even with very low insulin dose. Likely due to poor nutrition   · Poor nutrition is the main issue for now   · we recommend the following sq insulin regimen   · hold lantus   · Modified Low dose sliding scale (1u:100>200) with meals  · Continue following BG and adjust insulin regimen    Poor nutrition   · primary team to discuss with family distal feeding using NJ tube     primary Hypothyroidism  · Levothyroxine was adjusted during this admission to 88 mcg daily      ESRD  · Pt at risk for hypoglycemia  · On dialysis, nephrology following    Thank you for allowing us to participate in the care of this patient. Please do not hesitate to contact us with any additional questions. Rex Woo MD  Endocrinologist, [facility] Diabetes Care and Endocrinology   [address] [facility] [address]   Phone: [phone]  Fax: [phone]  ----------------------------  An electronic signature was used to authenticate this note.  Edin Guthrie MD on 4/12/2022 at 7:23 PM

## 2022-04-12 NOTE — PROGRESS NOTES
Patient did not eat any of her breakfast or lunch, this RN encouraged her to eat and offered food. For dinner patient ate 100% of her food, & also requested another dish of mac n cheese.

## 2022-04-12 NOTE — CARE COORDINATION
Here for hypoglycemia. Endocrinology consult. Dietary consult--continues w/ nausea poor intake. Need for TPN or tube feedings? Nephrology following eskd on HD. Per prior cm note-spoke to the patent's mother Best. She said she does plan on bringing her daughter home and not return to [facility]. She said she is a Registered Nurse and has been taking care of her daughter prior to her recent stay at [address] [place] [address]. I offered home health care and she declined. Mother said she has all the DME at home she needs: Rolator, ramped entry, BSC, wheelchair and incontinence products. She said she does not have home oxygen. will need amb pulse ox. DME order placed in anticipation of home oxygen need. Referral made to Gianluca Duenas at [facility] goes to HD at [facility] in [place] on T/T/S at 7 AM. Mother said she will call [facility] to set up transportation for dialysis. Mom said she will transport her daughter home at time of discharge.  cm/colette to follow. Electronically signed by Kate Hedrick RN on 4/12/2022 at 2:48 PM

## 2022-04-12 NOTE — PROGRESS NOTES
ENDOCRINOLOGY PROGRESS NOTE      Date of admission: 3/27/2022  Date of service: 4/11/2022  Admitting physician: Dede Dukes DO   Primary Care Physician: Meliton Navarro MD  Consultant physician: Charlee Self MD     Reason for the consultation:  DM type 1, labile diabetes     History of Present Illness: The history is provided from medical records, pt sedated intubated     Paul Cruz is a 36 y.o. old female nursing home resident with PMH of Type I DM, ESRD on PD,HTN,hypothyroidism, infective endocarditis and other listed below admitted to [facility] on 3/27/2022 because of AMS. Endocrine service was consulted for DM management.      subjective   Seen and examined, BG was good but appetite still poor     Point of care glucose monitoring (Independently reviewed)   Recent Labs     04/10/22  2047 04/11/22  0230 04/11/22  0707 04/11/22  1015 04/11/22  1202 04/11/22  1635 04/11/22  1856 04/11/22 2005   GLUMET 190* 184* 191* 208* 195* 124* 123* 128*     Scheduled Meds:   insulin glargine  1 Units SubCUTAneous Daily    metoclopramide  5 mg Oral 4x Daily    promethazine  25 mg Oral TID AC    acetaminophen  650 mg Oral Q6H    Or    acetaminophen  650 mg Rectal Q6H    hydrOXYzine  25 mg Oral Once    GI cocktail   Oral Once    dicyclomine  10 mg Oral TID AC    melatonin  6 mg Oral Nightly    insulin lispro  0-3 Units SubCUTAneous TID WC    sodium chloride flush  5-40 mL IntraVENous 2 times per day    heparin flush  1 mL IntraVENous 2 times per day    dilTIAZem  90 mg Oral Q6H    mirtazapine  15 mg Oral Nightly    escitalopram  10 mg Oral Daily    ARIPiprazole  5 mg Oral Nightly    rOPINIRole  0.25 mg Oral Nightly    levothyroxine  88 mcg Oral Daily    famotidine  10 mg Oral Daily    epoetin nena-epbx  3,000 Units SubCUTAneous Once per day on Mon Wed Fri    Vitamin D  1,000 Units Per NG tube Daily     PRN Meds:   sodium chloride, , PRN  sodium chloride, , PRN  ondansetron, 4 mg, Q4H PRN  trimethobenzamide, 200 mg, Q6H PRN  sodium chloride, , PRN  loperamide, 2 mg, 4x Daily PRN  sodium chloride, , PRN  sodium chloride flush, 5-40 mL, PRN  sodium chloride, , PRN  heparin flush, 1 mL, PRN  white petrolatum, , BID PRN  polyethylene glycol, 17 g, Daily PRN  glucose, 15 g, PRN  dextrose, 12.5 g, PRN  glucagon (rDNA), 1 mg, PRN  dextrose, 100 mL/hr, PRN  albuterol, 2.5 mg, Q6H PRN  labetalol, 5 mg, Q6H PRN      Continuous Infusions:   sodium chloride      sodium chloride      sodium chloride      sodium chloride      sodium chloride      dextrose         Review of Systems  Non-obtainable     OBJECTIVE    BP (!) 133/92   Pulse 91   Temp 97.9 °F (36.6 °C) (Oral)   Resp 16   Ht 5' 4\" (1.626 m)   Wt 133 lb 9.6 oz (60.6 kg)   SpO2 96%   BMI 22.93 kg/m²     Intake/Output Summary (Last 24 hours) at 4/11/2022 2055  Last data filed at 4/11/2022 0742  Gross per 24 hour   Intake 150 ml   Output --   Net 150 ml       Physical examination:  General: awake alert, oriented x3. Looks sick   HEENT: normocephalic non traumatic, no exophthalmos   Neck: supple, thyroid tenderness,  Pulm: Clear equal air entry no added sounds  CVS: S1 + S2  Abd: soft lax, no tenderness  Skin: warm, no lesions, no rash.  No open wounds, no ulcers   Neuro: CN intact, sensation decreased bilateral , muscle power normal  Psych: normal mood, and affect      Review of Laboratory Data:  I personally reviewed the following labs:   Recent Labs     04/09/22  0508 04/11/22 0451   WBC 3.9* 4.4*   RBC 3.76 3.74   HGB 9.9* 9.7*   HCT 30.8* 31.1*   MCV 81.9 83.2   MCH 26.3 25.9*   MCHC 32.1 31.2*   RDW 18.7* 18.6*    180   MPV 10.1 10.1     Recent Labs     04/09/22  0508 04/11/22 0451    138   K 3.5 4.5    100   CO2 26 24   BUN 10 10   CREATININE 3.2* 3.3*   GLUCOSE 72* 177*   CALCIUM 9.0 9.2   PROT 6.3* 6.8   LABALBU 3.2* 3.5   BILITOT 0.5 0.4   ALKPHOS 135* 156*   AST 10 11   ALT 8 9     Beta-Hydroxybutyrate   Date Value Ref Range Status   04/06/2022 >4.50 (H) 0.02 - 0.27 mmol/L Final   03/29/2022 >4.50 (H) 0.02 - 0.27 mmol/L Final   03/27/2022 0.10 0.02 - 0.27 mmol/L Final     Lab Results   Component Value Date    LABA1C 7.7 03/02/2022    LABA1C 8.7 12/08/2021    LABA1C 10.2 11/23/2021     Lab Results   Component Value Date/Time    TSH 5.370 (H) 03/28/2022 01:56 AM    T4FREE 1.10 03/28/2022 01:56 AM    D0ZPVFY 8.6 11/05/2015 02:20 AM    FT3 1.3 (L) 10/31/2020 10:43 AM    E4JNCWX 36.73 (L) 07/20/2020 02:00 PM     Lab Results   Component Value Date    LABA1C 7.7 03/02/2022    GLUCOSE 177 04/11/2022    GLUCOSE 130 05/18/2012    MALBCR 2949.3 01/15/2020    LABMICR 1740.1 01/15/2020    LABCREA 59 01/15/2020    LABCREA 61 01/15/2020     Lab Results   Component Value Date    TRIG 82 01/14/2020    HDL 33 01/14/2020    LDLCALC 46 01/14/2020    CHOL 95 01/14/2020       Blood culture   Lab Results   Component Value Date    BC 5 Days no growth 03/28/2022    BC 5 Days no growth 03/27/2022       Radiology:  XR ABDOMEN (KUB) (SINGLE AP VIEW)   Final Result   No evidence of bowel obstruction. XR CHEST PORTABLE   Final Result   1. The lungs are clear. There is no infiltrate or effusion. 2. Stable position of the support lines and tubes. XR CHEST PORTABLE   Final Result   1. There is no acute cardiopulmonary disease   2. Stable position of the support lines and tubes. XR CHEST PORTABLE   Final Result   Infiltrate and or atelectasis at the left lower lobe. Substantially resolved   infiltrate and or atelectasis on the right. New NG tube. CT HEAD WO CONTRAST   Final Result   No acute intracranial abnormality or hemorrhage. CT ABDOMEN PELVIS WO CONTRAST Additional Contrast? None   Final Result   1. Moderate ascites throughout abdomen and pelvis. 2.  Multiple thick walled loops of small bowel throughout the abdomen could   suggest nonspecific infectious or inflammatory enteritis.       3. Generalized body wall edema. 4.  Small bilateral pleural effusions with adjacent atelectatic changes. XR ABDOMEN FOR NG/OG/NE TUBE PLACEMENT   Final Result   Enteric tube tip in the body of the stomach. XR CHEST PORTABLE   Final Result   Right perihilar opacity. XR CHEST PORTABLE    (Results Pending)       Medical Records/Labs/Images review:   I personally reviewed and summarized previous records   All labs and imaging were reviewed independently     Mary Maldonado, a 36 y.o.-old female seen today for inpatient diabetes management     Diabetes Mellitus type I    · Extremely insulin sensitive. Pt is type 1 diabetic and needs long acting insulin   · we recommend the following sq insulin regimen   · Lantus ONE units daily   · Modified Low dose sliding scale (1u:100>200) with meals  · Continue following BG and adjust insulin regimen    primary Hypothyroidism  · Levothyroxine was adjusted during this admission to 88 mcg daily      ESRD  · Pt at risk for hypoglycemia  · On dialysis, nephrology following    Thank you for allowing us to participate in the care of this patient. Please do not hesitate to contact us with any additional questions. Jasiel Mcnair MD  Endocrinologist, [facility] Endocrinology   [address] [place] [id number]   Phone: [phone]  Fax: [phone]  ----------------------------  An electronic signature was used to authenticate this note.  Maddie Tom MD on 4/11/2022 at 8:55 PM

## 2022-04-12 NOTE — PROGRESS NOTES
Department of Internal Medicine  Nephrology Progress Note      Events reviewed. Patient seen on dialysis. SUBJECTIVE:  We are following Miss Amber Islas for ESKD on HD. Reports some abdominal pain, denies diarrhea. Reports constipation.     PHYSICAL EXAM:      Vitals:    VITALS:  BP (!) 156/92   Pulse 102   Temp 97.6 °F (36.4 °C)   Resp 16   Ht 5' 4\" (1.626 m)   Wt 133 lb 9.6 oz (60.6 kg)   SpO2 94%   BMI 22.93 kg/m²   24HR INTAKE/OUTPUT:    No intake or output data in the 24 hours ending 04/12/22 0902    Access: RIJ tunneled dialysis catheter  Constitutional: Awake, alert, NAD  HEENT:  PERRL, normocephalic, atraumatic  Respiratory:  CTA bilateral  Cardiovascular/Edema:  ST, RRR, no murmur gallop or rub  Gastrointestinal:  abd distended, c/o persistent abdominal pain  Neurologic: A&OX3 follows commands, no focal deficit  Skin:  Warm, dry, ecchymotic areas to abdomen    Other:    Scheduled Meds:   insulin glargine  1 Units SubCUTAneous Daily    metoclopramide  5 mg Oral 4x Daily    promethazine  25 mg Oral TID AC    acetaminophen  650 mg Oral Q6H    Or    acetaminophen  650 mg Rectal Q6H    GI cocktail   Oral Once    dicyclomine  10 mg Oral TID AC    melatonin  6 mg Oral Nightly    insulin lispro  0-3 Units SubCUTAneous TID WC    sodium chloride flush  5-40 mL IntraVENous 2 times per day    heparin flush  1 mL IntraVENous 2 times per day    dilTIAZem  90 mg Oral Q6H    mirtazapine  15 mg Oral Nightly    escitalopram  10 mg Oral Daily    ARIPiprazole  5 mg Oral Nightly    rOPINIRole  0.25 mg Oral Nightly    levothyroxine  88 mcg Oral Daily    famotidine  10 mg Oral Daily    epoetin nena-epbx  3,000 Units SubCUTAneous Once per day on Mon Wed Fri    Vitamin D  1,000 Units Per NG tube Daily     Continuous Infusions:   sodium chloride      sodium chloride      sodium chloride      sodium chloride      sodium chloride      dextrose 100 mL/hr (04/12/22 0523)     PRN Meds:.sodium chloride, sodium chloride, ondansetron, trimethobenzamide, sodium chloride, loperamide, sodium chloride, sodium chloride flush, sodium chloride, heparin flush, white petrolatum, polyethylene glycol, glucose, dextrose, glucagon (rDNA), dextrose, albuterol, labetalol    DATA:    CBC:   Lab Results   Component Value Date    WBC 4.4 04/12/2022    RBC 3.85 04/12/2022    HGB 10.0 04/12/2022    HCT 31.9 04/12/2022    MCV 82.9 04/12/2022    MCH 26.0 04/12/2022    MCHC 31.3 04/12/2022    RDW 18.7 04/12/2022     04/12/2022    MPV 10.3 04/12/2022     CMP:    Lab Results   Component Value Date     04/12/2022    K 4.3 04/12/2022     04/12/2022    CO2 23 04/12/2022    BUN 14 04/12/2022    CREATININE 4.2 04/12/2022    GFRAA 15 04/12/2022    LABGLOM 15 04/12/2022    GLUCOSE 29 04/12/2022    GLUCOSE 130 05/18/2012    PROT 7.2 04/12/2022    LABALBU 3.5 04/12/2022    LABALBU 4.1 05/18/2012    CALCIUM 9.3 04/12/2022    BILITOT 0.4 04/12/2022    ALKPHOS 159 04/12/2022    AST 10 04/12/2022    ALT 7 04/12/2022     Magnesium:    Lab Results   Component Value Date    MG 2.2 04/12/2022     Phosphorus:    Lab Results   Component Value Date    PHOS 3.2 04/12/2022     Radiology Review:      CT Head WO Contrast March 27, 2022   No acute intracranial abnormality or hemorrhage.         CT Abdomen Pelvis WO Contrast March 27, 2022   1.  Moderate ascites throughout abdomen and pelvis.       2.  Multiple thick walled loops of small bowel throughout the abdomen could   suggest nonspecific infectious or inflammatory enteritis.       3.  Generalized body wall edema.       4.  Small bilateral pleural effusions with adjacent atelectatic changes.           Chest X-Ray March 28, 2022   Infiltrate and or atelectasis at the left lower lobe.  Substantially resolved   infiltrate and or atelectasis on the right.  New NG tube.           BRIEF SUMMARY OF INITIAL CONSULT:    Briefly, Miss Cely Wakefield is a 36year-old female with a PMH of ESRD on hemodialysis 3 days/wk, on TTS schedule at [facility] I DM, poorly controlled with recurrent admissions for DKA, HTN, gastroparesis, ascites, infective endocarditis, cardiac arrest, hypothyroidism, recent Covid, and depression who was admitted on March 27, 2022 after she was found unresponsive at the SNF where she resides. Patient was found to be profoundly hypoglycemic and EMS was called. She was intubated in ER for airway protection as she remained unresponsive. CT head showed no acute intracranial abnormality or hemorrhage, chest x-ray demonstrates right perihilar opacity concerning for aspiration pneumonia. She was ultimately admitted to MICU for further evaluation. Labs on admission were significant for sodium 135, potassium 5.0, chloride 100, bicarbonate 25, BUN 38, creatinine 4.9, proBNP >70,000. We are consulted for dialysis management. Problems resolved. · Hypoglycemia, was on D10, now hyperglycemic with +ketones (beta-hydroxybutyrate >4.5)  · Acute respiratory failure, 2/2 aspiration pneumonia? On 40% Fio2. Extubated 0/13  · Acute metabolic encephalopathy 2/2 hypoglycemia. Resolving   · Acidemia, pH 7.311, PCO2 35.0, HCO3 46.3, metabolic acidosis from DKA  · Hx recurrent C. difficile infection, on flagyl po Q 8hrs   · Hx of MDRO UTI    IMPRESSION/RECOMMENDATIONS:      1. ESKD 3 times a week TTS via RIJ tunneled dialysis catheter. Having dialysis today, tolerating well. 2. HTN, on cardizem 90 mg po qid, to change to long-acting  3. MBD of CKD, currently with hypophosphatemia. Binders d/c'd, to replace. 4. Anemia of CKD, continue epoetin alpha 3,000 unit 3 times/wk. s/p transfusion   5. Vitamin D deficiency, on ergocalciferol 1000 po daily  ------------------------------------------------------  6. Type I DM, poorly controlled with frequent readmissions for DKA, on SSI, lantus decreased to 1 unit nightly  7. Aspiration pneumonia? S/p piperacillin    8.  Restless leg syndrome, on ropinirole at home  9. Depression, on escitalopram and Abilify  10. Hypothyroidism, on levothyroxine   11. History of gastroparesis, on metoclopramide at home. For EGD and pyloric dilatation with Botox injection outpatient per GI.       Plan:     · Continue HD three times/wk, TTS while inpatient.   · Continue epoetin alpha 3,000 units 3 times/wk  · Change Cardizem to long-acting 300 mg daily   · Continue to monitor glucose levels  · Continue to monitor phosphorus levels   · Monitor BP  · Discharge planning      Electronically signed by Gui Valdovinos MD on 4/12/2022 at 9:02 AM

## 2022-04-12 NOTE — PLAN OF CARE
Problem: Skin Integrity:  Goal: Will show no infection signs and symptoms  Outcome: Met This Shift  Goal: Absence of new skin breakdown  Outcome: Met This Shift     Problem: Serum Glucose Level - Abnormal:  Goal: Ability to maintain appropriate glucose levels has stabilized  Outcome: Met This Shift     Problem: Breathing Pattern - Ineffective:  Goal: Ability to achieve and maintain a regular respiratory rate will improve  Outcome: Met This Shift     Problem: Cardiac Output - Decreased:  Goal: Hemodynamic stability will improve  Outcome: Met This Shift     Problem: Diarrhea:  Goal: Bowel elimination is within specified parameters  Outcome: Met This Shift  Goal: Passage of soft, formed stool  Outcome: Met This Shift  Goal: Establishment of normal bowel function will improve to within specified parameters  Outcome: Met This Shift     Problem: Pain:  Description: Pain management should include both nonpharmacologic and pharmacologic interventions.   Goal: Pain level will decrease  Outcome: Met This Shift  Goal: Control of acute pain  Outcome: Met This Shift  Goal: Control of chronic pain  Outcome: Met This Shift

## 2022-04-12 NOTE — PROGRESS NOTES
[facility]  Internal Medicine Residency Program  Progress Note - House Team 2    Patient:  Hedy Holguin 36 y.o. female MRN: [id number]     Date of Service: 4/12/2022     CC: AMS   Overnight events: NAEO     Subjective      On day 16 of hospital admission    Patient seen at bedside this AM   Patient admits to mild relief with Reglan and Phenergan   Patient states mild improvement in nausea but abdominal pain has been consistent.  BG this AM <40, was given D50 and repeat 86. Objective     Physical Exam:  · Vitals: BP (!) 151/93   Pulse 102   Temp 97.6 °F (36.4 °C) (Oral)   Resp 18   Ht 5' 4\" (1.626 m)   Wt 133 lb 9.6 oz (60.6 kg)   SpO2 95%   BMI 22.93 kg/m²       · Constitutional: Awake, alert, able to answer questions. Follows commands. In no apparent distress. · Head: Normocephalic and atraumatic. · Eyes: EOMI, conjunctiva normal, (-) scleral icterus. Mucus membranes moist.  · Mouth: Mucus membranes moist. Oropharynx clear. No deviation of the tongue or uvula. Normal dentition. Oral thrush (+)  · Neck: (-)  swelling, (-) JVD, trachea midline  · Respiratory: Lungs clear to auscultation bilaterally. (-)  wheezes, (-)  rales, (-)  rhonchi. No increased work of breathing or respiratory distress  · Cardiovascular: RRR. (-)  murmurs, (-) gallops,  (-) rubs. S1 and S2 were normal.   · GI:  Abdomen soft, non-tender, non-distended. (+) BS. (-) guarding, (-) rigidity. · Extremities: Warm and well perfused. (-) clubbing, (-) cyanosis. 2+ distal pulses. (-) peripheral edema. (-)Sacral pitting edema. · Neurologic:  No focal neurological deficits.   No tremor or overt seizure activity    Pertinent Labs & Imaging Studies   jorge alberto  CBC with Differential:    Lab Results   Component Value Date    WBC 4.4 04/12/2022    RBC 3.85 04/12/2022    HGB 10.0 04/12/2022    HCT 31.9 04/12/2022     04/12/2022    MCV 82.9 04/12/2022    MCH 26.0 04/12/2022    MCHC 31.3 04/12/2022    RDW 18.7 04/12/2022    NRBC 0.9 03/28/2022    SEGSPCT 67 06/27/2013    METASPCT 1.7 08/10/2020    LYMPHOPCT 28.2 04/12/2022    MONOPCT 7.0 04/12/2022    MYELOPCT 0.9 01/19/2022    BASOPCT 0.9 04/12/2022    MONOSABS 0.31 04/12/2022    LYMPHSABS 1.25 04/12/2022    EOSABS 0.18 04/12/2022    BASOSABS 0.04 04/12/2022     CMP:    Lab Results   Component Value Date     04/12/2022    K 4.3 04/12/2022     04/12/2022    CO2 23 04/12/2022    BUN 14 04/12/2022    CREATININE 4.2 04/12/2022    GFRAA 15 04/12/2022    LABGLOM 15 04/12/2022    GLUCOSE 29 04/12/2022    GLUCOSE 130 05/18/2012    PROT 7.2 04/12/2022    LABALBU 3.5 04/12/2022    LABALBU 4.1 05/18/2012    CALCIUM 9.3 04/12/2022    BILITOT 0.4 04/12/2022    ALKPHOS 159 04/12/2022    AST 10 04/12/2022    ALT 7 04/12/2022     Magnesium:    Lab Results   Component Value Date    MG 2.2 04/12/2022     Phosphorus:    Lab Results   Component Value Date    PHOS 3.2 04/12/2022     Troponin:    Lab Results   Component Value Date    TROPONINI 0.12 05/14/2021     Resident's Assessment and Plan     Assessment     1. Nausea/Vomiting w/ Abdominal pain with Hx of gastroparesis 2/2 Hx of IDDM  2. HTN  3. ESRD on HD TTS  4. IDDM1-A1c 7.7% this admission  5. Hypothyroidism-TSH 5.37, FT4 1.10 normal  6. Acute on chronic anemia-s/p 3 unit PRBCs, chronic component likely due to ESRD  7. History of MDRO UTI  8. History of C. difficile infection  9. Oral Candidiasis    Resolved/Stable Problems:   · Acute hypoxic respiratory failure   · Acute metabolic encephalopathy-likely due to hypoglycemia in setting of DM1 and ESRD (CTH negative, negative UDS/SDS, NH3 normal, B12 normal 1 month ago)  · Aspiration pneumonia     Plan   Pepcid, Zofran and Bentyl to aid w/ abdominal pain/dyspepsia.  Increased Reglan 5mg ->10mg 4x daily   Per Endocrine, continue on 1U Lantus nightly w/ modified SSI. Shy Infante w/ Endocrine - Dr. Radhika Albrecht. Plan to continue f/u as outpatient when pt discharged.   o Per Endo, upon discharge, patient to be on Lantus 1u nighty with current sliding scale with meals. Patient is NOT to be on bedtime sliding scale   Continue Synthroid 88 mcg daily   HD TThS per nephro   Continue Abilify and Celexa   Glucose checks q4hrs   Discussed with nephro, considerations to switch cardizem to coreg in for concerns of delayed gastric emptying.    Oral candidiasis appreciated on physical exam - oral mouthwash ordered   Appreciate Endo, nephro, recommendations    PT/OT evaluation: Not indicated at this time  DVT prophylaxis/ GI prophylaxis: Heparin/Pepcid  Disposition: Continue current care    Dee Birmingham MD, PGY-1  Attending physician: Dr. Berna Marin

## 2022-04-12 NOTE — FLOWSHEET NOTE
04/12/22 1225   Vital Signs   BP (!) 163/99   Temp 97.6 °F (36.4 °C)   Pulse 106   Resp 16   Pain Assessment   Pain Assessment 0-10   Pain Level 0   Post-Hemodialysis Assessment   Post-Treatment Procedures Blood returned;Catheter capped, clamped and heparinized x 2 ports   Machine Disinfection Process Acid/Vinegar Clean;Verified Absence of Bleach in HCO3 Jug;Heat Disinfect; Machine Absence of [employer]; Exterior Machine Disinfection; Bicarb Jug Disinfection   Rinseback Volume (ml) 300 ml   Total Liters Processed (l/min) 66.8 l/min   Dialyzer Clearance Lightly streaked   Duration of Treatment (minutes) 210 minutes   Hemodialysis Output (ml) 2500 ml   NET Removed (ml) 2500 ml   Tolerated Treatment Good   Patient Response to Treatment patient tolerated treatment well. though patients blood pressure readings are high, it appears to be patients baseline. and shows no signs of distress. Bilateral Breath Sounds Clear   Edema None   Patient for 3.5 hour hemodialysis treatment on a 3K 2.5Ca bath with a blood volume process of 66.8L and a fluid volume removal of 2500ml Patient resting in bed, and shows no signs of distress during treatment or at time of transport back to unit. Report called to primary gil Rizzo. Date & Time: 2/11/2019, 7:05 PM  Patient: Alissa Vega  Encounter Provider(s):    Emeline Denver, APRN       To Whom It May Concern:    Virgina Ahumada was seen and treated in our department on 2/9/2019.      If you have any questions or conc

## 2022-04-13 ENCOUNTER — TELEPHONE (OUTPATIENT)
Dept: INTERNAL MEDICINE | Age: 32
End: 2022-04-13

## 2022-04-13 LAB
ALBUMIN SERPL-MCNC: 3.3 G/DL (ref 3.5–5.2)
ALP BLD-CCNC: 143 U/L (ref 35–104)
ALT SERPL-CCNC: 7 U/L (ref 0–32)
ANION GAP SERPL CALCULATED.3IONS-SCNC: 10 MMOL/L (ref 7–16)
ANION GAP SERPL CALCULATED.3IONS-SCNC: 13 MMOL/L (ref 7–16)
AST SERPL-CCNC: 8 U/L (ref 0–31)
BASOPHILS ABSOLUTE: 0.04 E9/L (ref 0–0.2)
BASOPHILS RELATIVE PERCENT: 0.8 % (ref 0–2)
BETA-HYDROXYBUTYRATE: 2.56 MMOL/L (ref 0.02–0.27)
BILIRUB SERPL-MCNC: 0.3 MG/DL (ref 0–1.2)
BILIRUBIN DIRECT: <0.2 MG/DL (ref 0–0.3)
BILIRUBIN, INDIRECT: ABNORMAL MG/DL (ref 0–1)
BUN BLDV-MCNC: 8 MG/DL (ref 6–20)
BUN BLDV-MCNC: 9 MG/DL (ref 6–20)
CALCIUM SERPL-MCNC: 8.4 MG/DL (ref 8.6–10.2)
CALCIUM SERPL-MCNC: 9 MG/DL (ref 8.6–10.2)
CHLORIDE BLD-SCNC: 97 MMOL/L (ref 98–107)
CHLORIDE BLD-SCNC: 97 MMOL/L (ref 98–107)
CO2: 24 MMOL/L (ref 22–29)
CO2: 25 MMOL/L (ref 22–29)
CREAT SERPL-MCNC: 2.4 MG/DL (ref 0.5–1)
CREAT SERPL-MCNC: 2.6 MG/DL (ref 0.5–1)
EOSINOPHILS ABSOLUTE: 0.15 E9/L (ref 0.05–0.5)
EOSINOPHILS RELATIVE PERCENT: 2.9 % (ref 0–6)
GFR AFRICAN AMERICAN: 26
GFR AFRICAN AMERICAN: 29
GFR NON-AFRICAN AMERICAN: 26 ML/MIN/1.73
GFR NON-AFRICAN AMERICAN: 29 ML/MIN/1.73
GLUCOSE BLD-MCNC: 268 MG/DL (ref 74–99)
GLUCOSE BLD-MCNC: 328 MG/DL (ref 74–99)
HCT VFR BLD CALC: 28.2 % (ref 34–48)
HEMOGLOBIN: 8.8 G/DL (ref 11.5–15.5)
IMMATURE GRANULOCYTES #: 0.01 E9/L
IMMATURE GRANULOCYTES %: 0.2 % (ref 0–5)
LYMPHOCYTES ABSOLUTE: 1 E9/L (ref 1.5–4)
LYMPHOCYTES RELATIVE PERCENT: 19.4 % (ref 20–42)
MAGNESIUM: 1.9 MG/DL (ref 1.6–2.6)
MCH RBC QN AUTO: 25.2 PG (ref 26–35)
MCHC RBC AUTO-ENTMCNC: 31.2 % (ref 32–34.5)
MCV RBC AUTO: 80.8 FL (ref 80–99.9)
METER GLUCOSE: 104 MG/DL (ref 74–99)
METER GLUCOSE: 133 MG/DL (ref 74–99)
METER GLUCOSE: 227 MG/DL (ref 74–99)
METER GLUCOSE: 271 MG/DL (ref 74–99)
METER GLUCOSE: 341 MG/DL (ref 74–99)
METER GLUCOSE: 84 MG/DL (ref 74–99)
MONOCYTES ABSOLUTE: 0.35 E9/L (ref 0.1–0.95)
MONOCYTES RELATIVE PERCENT: 6.8 % (ref 2–12)
NEUTROPHILS ABSOLUTE: 3.61 E9/L (ref 1.8–7.3)
NEUTROPHILS RELATIVE PERCENT: 69.9 % (ref 43–80)
PDW BLD-RTO: 19.6 FL (ref 11.5–15)
PHOSPHORUS: 1.7 MG/DL (ref 2.5–4.5)
PLATELET # BLD: 184 E9/L (ref 130–450)
PMV BLD AUTO: 10.2 FL (ref 7–12)
POTASSIUM REFLEX MAGNESIUM: 4.2 MMOL/L (ref 3.5–5)
POTASSIUM SERPL-SCNC: 4.4 MMOL/L (ref 3.5–5)
RBC # BLD: 3.49 E12/L (ref 3.5–5.5)
SODIUM BLD-SCNC: 132 MMOL/L (ref 132–146)
SODIUM BLD-SCNC: 134 MMOL/L (ref 132–146)
TOTAL PROTEIN: 6.4 G/DL (ref 6.4–8.3)
WBC # BLD: 5.2 E9/L (ref 4.5–11.5)

## 2022-04-13 PROCEDURE — 2500000003 HC RX 250 WO HCPCS: Performed by: NURSE PRACTITIONER

## 2022-04-13 PROCEDURE — 6370000000 HC RX 637 (ALT 250 FOR IP): Performed by: INTERNAL MEDICINE

## 2022-04-13 PROCEDURE — 85025 COMPLETE CBC W/AUTO DIFF WBC: CPT

## 2022-04-13 PROCEDURE — 2580000003 HC RX 258: Performed by: INTERNAL MEDICINE

## 2022-04-13 PROCEDURE — S5553 INSULIN LONG ACTING 5 U: HCPCS | Performed by: INTERNAL MEDICINE

## 2022-04-13 PROCEDURE — 84100 ASSAY OF PHOSPHORUS: CPT

## 2022-04-13 PROCEDURE — 82010 KETONE BODYS QUAN: CPT

## 2022-04-13 PROCEDURE — 6360000002 HC RX W HCPCS: Performed by: NURSE PRACTITIONER

## 2022-04-13 PROCEDURE — 6360000002 HC RX W HCPCS: Performed by: INTERNAL MEDICINE

## 2022-04-13 PROCEDURE — 99232 SBSQ HOSP IP/OBS MODERATE 35: CPT | Performed by: INTERNAL MEDICINE

## 2022-04-13 PROCEDURE — 80076 HEPATIC FUNCTION PANEL: CPT

## 2022-04-13 PROCEDURE — 80048 BASIC METABOLIC PNL TOTAL CA: CPT

## 2022-04-13 PROCEDURE — 6370000000 HC RX 637 (ALT 250 FOR IP): Performed by: STUDENT IN AN ORGANIZED HEALTH CARE EDUCATION/TRAINING PROGRAM

## 2022-04-13 PROCEDURE — 82962 GLUCOSE BLOOD TEST: CPT

## 2022-04-13 PROCEDURE — 83735 ASSAY OF MAGNESIUM: CPT

## 2022-04-13 PROCEDURE — 36415 COLL VENOUS BLD VENIPUNCTURE: CPT

## 2022-04-13 PROCEDURE — 99231 SBSQ HOSP IP/OBS SF/LOW 25: CPT | Performed by: INTERNAL MEDICINE

## 2022-04-13 PROCEDURE — 2580000003 HC RX 258: Performed by: NURSE PRACTITIONER

## 2022-04-13 PROCEDURE — 80053 COMPREHEN METABOLIC PANEL: CPT

## 2022-04-13 PROCEDURE — 1200000000 HC SEMI PRIVATE

## 2022-04-13 PROCEDURE — 6370000000 HC RX 637 (ALT 250 FOR IP): Performed by: NURSE PRACTITIONER

## 2022-04-13 RX ORDER — FAMOTIDINE 20 MG/1
10 TABLET, FILM COATED ORAL 2 TIMES DAILY
Status: DISCONTINUED | OUTPATIENT
Start: 2022-04-13 | End: 2022-04-14

## 2022-04-13 RX ORDER — CARVEDILOL 6.25 MG/1
6.25 TABLET ORAL 2 TIMES DAILY
Status: DISCONTINUED | OUTPATIENT
Start: 2022-04-13 | End: 2022-04-16

## 2022-04-13 RX ORDER — DIPHENHYDRAMINE HYDROCHLORIDE 50 MG/ML
25 INJECTION INTRAMUSCULAR; INTRAVENOUS ONCE
Status: COMPLETED | OUTPATIENT
Start: 2022-04-13 | End: 2022-04-13

## 2022-04-13 RX ORDER — INSULIN GLARGINE-YFGN 100 [IU]/ML
1 INJECTION, SOLUTION SUBCUTANEOUS DAILY
Status: DISCONTINUED | OUTPATIENT
Start: 2022-04-13 | End: 2022-04-15

## 2022-04-13 RX ADMIN — EPOETIN ALFA-EPBX 3000 UNITS: 3000 INJECTION, SOLUTION INTRAVENOUS; SUBCUTANEOUS at 09:24

## 2022-04-13 RX ADMIN — Medication 10 ML: at 21:26

## 2022-04-13 RX ADMIN — ACETAMINOPHEN 650 MG: 325 TABLET ORAL at 12:30

## 2022-04-13 RX ADMIN — ROPINIROLE 0.25 MG: 0.25 TABLET, FILM COATED ORAL at 21:25

## 2022-04-13 RX ADMIN — INSULIN GLARGINE-YFGN 1 UNITS: 100 INJECTION, SOLUTION SUBCUTANEOUS at 14:10

## 2022-04-13 RX ADMIN — ESCITALOPRAM 10 MG: 10 TABLET, FILM COATED ORAL at 09:23

## 2022-04-13 RX ADMIN — INSULIN LISPRO 2 UNITS: 100 INJECTION, SOLUTION INTRAVENOUS; SUBCUTANEOUS at 09:25

## 2022-04-13 RX ADMIN — Medication 10 ML: at 09:24

## 2022-04-13 RX ADMIN — MIRTAZAPINE 15 MG: 15 TABLET, FILM COATED ORAL at 21:25

## 2022-04-13 RX ADMIN — PROMETHAZINE HYDROCHLORIDE 25 MG: 25 TABLET ORAL at 11:05

## 2022-04-13 RX ADMIN — FAMOTIDINE 10 MG: 20 TABLET ORAL at 21:24

## 2022-04-13 RX ADMIN — Medication 6 MG: at 21:25

## 2022-04-13 RX ADMIN — ACETAMINOPHEN 650 MG: 325 TABLET ORAL at 17:27

## 2022-04-13 RX ADMIN — PROMETHAZINE HYDROCHLORIDE 25 MG: 25 TABLET ORAL at 05:58

## 2022-04-13 RX ADMIN — DIPHENHYDRAMINE HYDROCHLORIDE 25 MG: 50 INJECTION, SOLUTION INTRAMUSCULAR; INTRAVENOUS at 21:24

## 2022-04-13 RX ADMIN — DILTIAZEM HYDROCHLORIDE 300 MG: 180 CAPSULE, COATED, EXTENDED RELEASE ORAL at 09:23

## 2022-04-13 RX ADMIN — DICYCLOMINE HYDROCHLORIDE 10 MG: 10 CAPSULE ORAL at 05:58

## 2022-04-13 RX ADMIN — LOPERAMIDE HYDROCHLORIDE 2 MG: 2 CAPSULE ORAL at 21:24

## 2022-04-13 RX ADMIN — METOCLOPRAMIDE 10 MG: 5 TABLET ORAL at 21:25

## 2022-04-13 RX ADMIN — CARVEDILOL 6.25 MG: 6.25 TABLET, FILM COATED ORAL at 21:25

## 2022-04-13 RX ADMIN — DIPHENHYDRAMINE HYDROCHLORIDE 25 MG: 25 TABLET ORAL at 17:30

## 2022-04-13 RX ADMIN — FAMOTIDINE 10 MG: 20 TABLET ORAL at 09:23

## 2022-04-13 RX ADMIN — ARIPIPRAZOLE 5 MG: 5 TABLET ORAL at 21:25

## 2022-04-13 RX ADMIN — LEVOTHYROXINE SODIUM 88 MCG: 0.09 TABLET ORAL at 05:58

## 2022-04-13 RX ADMIN — METOCLOPRAMIDE 10 MG: 5 TABLET ORAL at 09:23

## 2022-04-13 RX ADMIN — Medication 1000 UNITS: at 09:24

## 2022-04-13 RX ADMIN — SODIUM CHLORIDE, PRESERVATIVE FREE 100 UNITS: 5 INJECTION INTRAVENOUS at 09:24

## 2022-04-13 RX ADMIN — METOCLOPRAMIDE 10 MG: 5 TABLET ORAL at 12:31

## 2022-04-13 RX ADMIN — METOCLOPRAMIDE 10 MG: 5 TABLET ORAL at 17:27

## 2022-04-13 RX ADMIN — SODIUM CHLORIDE, PRESERVATIVE FREE 100 UNITS: 5 INJECTION INTRAVENOUS at 21:26

## 2022-04-13 RX ADMIN — CARVEDILOL 6.25 MG: 6.25 TABLET, FILM COATED ORAL at 12:30

## 2022-04-13 RX ADMIN — ONDANSETRON 4 MG: 2 INJECTION INTRAMUSCULAR; INTRAVENOUS at 17:30

## 2022-04-13 RX ADMIN — SODIUM PHOSPHATE, MONOBASIC, MONOHYDRATE 15 MMOL: 276; 142 INJECTION, SOLUTION INTRAVENOUS at 14:06

## 2022-04-13 RX ADMIN — INSULIN LISPRO 1 UNITS: 100 INJECTION, SOLUTION INTRAVENOUS; SUBCUTANEOUS at 12:31

## 2022-04-13 RX ADMIN — ACETAMINOPHEN 650 MG: 325 TABLET ORAL at 05:58

## 2022-04-13 ASSESSMENT — PAIN SCALES - GENERAL
PAINLEVEL_OUTOF10: 5
PAINLEVEL_OUTOF10: 0
PAINLEVEL_OUTOF10: 6
PAINLEVEL_OUTOF10: 0
PAINLEVEL_OUTOF10: 0

## 2022-04-13 NOTE — PROGRESS NOTES
[facility]  Internal Medicine Residency Program  Progress Note - House Team 2    Patient:  Liza Bowman 36 y.o. female MRN: [id number]     Date of Service: 4/13/2022     CC: AMS   Overnight events: NAEO     Subjective      On day 17 of hospital admission    Patient seen at bedside this AM   Patient admits to mild relief with Reglan and Phenergan   Patient states mild improvement in nausea but abdominal pain has been consistent.  BG this  -> 328  -> slliding scale 2u Lispro -> 227    Objective     Physical Exam:  · Vitals: /89   Pulse 101   Temp 98.2 °F (36.8 °C) (Oral)   Resp 18   Ht 5' 4\" (1.626 m)   Wt 133 lb 9.6 oz (60.6 kg)   SpO2 94%   BMI 22.93 kg/m²       · Constitutional: Awake, alert, able to answer questions. Follows commands. In no apparent distress. · Head: Normocephalic and atraumatic. · Eyes: EOMI, conjunctiva normal, (-) scleral icterus. Mucus membranes moist.  · Mouth: Mucus membranes moist. Oropharynx clear. No deviation of the tongue or uvula. Normal dentition. Oral thrush (+)  · Neck: (-)  swelling, (-) JVD, trachea midline  · Respiratory: Lungs clear to auscultation bilaterally. (-)  wheezes, (-)  rales, (-)  rhonchi. No increased work of breathing or respiratory distress  · Cardiovascular: RRR. (-)  murmurs, (-) gallops,  (-) rubs. S1 and S2 were normal.   · GI:  Abdomen soft, non-tender, non-distended. (+) BS. (-) guarding, (-) rigidity. · Extremities: Warm and well perfused. (-) clubbing, (-) cyanosis. 2+ distal pulses. (-) peripheral edema. (-)Sacral pitting edema. · Neurologic:  No focal neurological deficits.   No tremor or overt seizure activity    Pertinent Labs & Imaging Studies   jorge alberto  CBC with Differential:    Lab Results   Component Value Date    WBC 5.2 04/13/2022    RBC 3.49 04/13/2022    HGB 8.8 04/13/2022    HCT 28.2 04/13/2022     04/13/2022    MCV 80.8 04/13/2022    MCH 25.2 04/13/2022    MCHC 31.2 04/13/2022    RDW 19.6 04/13/2022    NRBC 0.9 03/28/2022    SEGSPCT 67 06/27/2013    METASPCT 1.7 08/10/2020    LYMPHOPCT 19.4 04/13/2022    MONOPCT 6.8 04/13/2022    MYELOPCT 0.9 01/19/2022    BASOPCT 0.8 04/13/2022    MONOSABS 0.35 04/13/2022    LYMPHSABS 1.00 04/13/2022    EOSABS 0.15 04/13/2022    BASOSABS 0.04 04/13/2022     CMP:    Lab Results   Component Value Date     04/13/2022    K 4.4 04/13/2022    K 4.2 04/13/2022    CL 97 04/13/2022    CO2 24 04/13/2022    BUN 9 04/13/2022    CREATININE 2.6 04/13/2022    GFRAA 26 04/13/2022    LABGLOM 26 04/13/2022    GLUCOSE 328 04/13/2022    GLUCOSE 130 05/18/2012    PROT 6.4 04/13/2022    LABALBU 3.3 04/13/2022    LABALBU 4.1 05/18/2012    CALCIUM 9.0 04/13/2022    BILITOT 0.3 04/13/2022    ALKPHOS 143 04/13/2022    AST 8 04/13/2022    ALT 7 04/13/2022     Magnesium:    Lab Results   Component Value Date    MG 1.9 04/13/2022     Phosphorus:    Lab Results   Component Value Date    PHOS 1.7 04/13/2022     Troponin:    Lab Results   Component Value Date    TROPONINI 0.12 05/14/2021     Resident's Assessment and Plan     Assessment     1. Nausea/Vomiting w/ Abdominal pain with Hx of gastroparesis 2/2 Hx of IDDM  2. HTN  3. ESRD on HD TTS  4. IDDM1-A1c 7.7% this admission  5. Hypothyroidism-TSH 5.37, FT4 1.10 normal  6. Acute on chronic anemia-s/p 3 unit PRBCs, chronic component likely due to ESRD  7. History of MDRO UTI  8. History of C. difficile infection  9. Oral Candidiasis    Resolved/Stable Problems:   · Acute hypoxic respiratory failure   · Acute metabolic encephalopathy-likely due to hypoglycemia in setting of DM1 and ESRD (CTH negative, negative UDS/SDS, NH3 normal, B12 normal 1 month ago)  · Aspiration pneumonia     Plan   Pepcid, Zofran and Bentyl to aid w/ abdominal pain/dyspepsia.  Reglan 10mg 4x daily   Per Endocrine, continue on 1U Lantus nightly w/ modified SSI. Ernst Garcia w/ Endocrine - Dr. Gabriel Horne. Plan to continue f/u as outpatient when pt discharged.   o Per Endo, upon discharge, patient to be on Lantus 1u nighty with current sliding scale with meals. Patient is NOT to be on bedtime sliding scale   Continue Synthroid 88 mcg daily   HD TThS per nephro   Continue Abilify and Celexa   Glucose checks q4hrs   Switched Cardizem to Coreg 6.25mg BID in concerns for delayed gastric emptying   Oral candidiasis appreciated on physical exam - oral mouthwash ordered   Appreciate Endo, nephro, recommendations   EKG ordered to assess QTc status as patient is on QTc prolonging meds (ex: reglan)   BHB: 2.56 and BMP ordered with Anion Gap: 13 - will continue to monitor at this time.       PT/OT evaluation: Not indicated at this time  DVT prophylaxis/ GI prophylaxis: Heparin/Pepcid  Disposition: Continue current care    Duke Anderson MD, PGY-1  Attending physician: Dr. Jacques Galindo

## 2022-04-13 NOTE — PROGRESS NOTES
ENDOCRINOLOGY PROGRESS NOTE  Via Tele-Health Service       Date of admission: 3/27/2022  Date of service: 4/13/2022  Admitting physician: Emre Garcia DO   Primary Care Physician: Gopal Correa MD  Consultant physician: Kraig Davis MD     Reason for the consultation:  DM type 1, labile diabetes     History of Present Illness: The history is provided from medical records, pt sedated intubated     Sharri Peguero is a 36 y.o. old female nursing home resident with PMH of Type I DM, ESRD on PD,HTN,hypothyroidism, infective endocarditis and other listed below admitted to [facility] on 3/27/2022 because of AMS. Endocrine service was consulted for DM management.      subjective   BG was high this  without DKA     Point of care glucose monitoring (Independently reviewed)   Recent Labs     04/12/22  1651 04/12/22  2020 04/12/22  2306 04/13/22  0530 04/13/22  0840 04/13/22  1141 04/13/22  1402 04/13/22  1719   GLUMET 95 183* 236* 271* 341* 227* 133* 84     Scheduled Meds:   carvedilol  6.25 mg Oral BID    famotidine  10 mg Oral BID    insulin glargine  1 Units SubCUTAneous Daily    metoclopramide  10 mg Oral 4x Daily    promethazine  25 mg Oral TID AC    acetaminophen  650 mg Oral Q6H    Or    acetaminophen  650 mg Rectal Q6H    melatonin  6 mg Oral Nightly    insulin lispro  0-3 Units SubCUTAneous TID WC    sodium chloride flush  5-40 mL IntraVENous 2 times per day    heparin flush  1 mL IntraVENous 2 times per day    mirtazapine  15 mg Oral Nightly    escitalopram  10 mg Oral Daily    ARIPiprazole  5 mg Oral Nightly    rOPINIRole  0.25 mg Oral Nightly    levothyroxine  88 mcg Oral Daily    epoetin nena-epbx  3,000 Units SubCUTAneous Once per day on Mon Wed Fri    Vitamin D  1,000 Units Per NG tube Daily     PRN Meds:   magic (miracle) mouthwash, 5 mL, 4x Daily PRN  diphenhydrAMINE, 25 mg, Q6H PRN  ondansetron, 4 mg, Q4H PRN  trimethobenzamide, 200 mg, Q6H PRN  loperamide, 2 mg, 4x Daily PRN  sodium chloride flush, 5-40 mL, PRN  sodium chloride, , PRN  heparin flush, 1 mL, PRN  white petrolatum, , BID PRN  polyethylene glycol, 17 g, Daily PRN  glucose, 15 g, PRN  dextrose, 12.5 g, PRN  glucagon (rDNA), 1 mg, PRN  dextrose, 100 mL/hr, PRN  albuterol, 2.5 mg, Q6H PRN  labetalol, 5 mg, Q6H PRN      Continuous Infusions:   sodium chloride      dextrose Stopped (04/12/22 2307)       Review of Systems  Non-obtainable     OBJECTIVE    BP (!) 135/92   Pulse 101   Temp 97.5 °F (36.4 °C) (Oral)   Resp 18   Ht 5' 4\" (1.626 m)   Wt 133 lb 9.6 oz (60.6 kg)   SpO2 92%   BMI 22.93 kg/m²     Intake/Output Summary (Last 24 hours) at 4/13/2022 1930  Last data filed at 4/13/2022 1406  Gross per 24 hour   Intake 60 ml   Output --   Net 60 ml     Physical examination:  Due to this being a TeleHealth encounter, evaluation of the following organ systems is limited: Vitals/Constitutional/EENT/Resp/CV/GI//MS/Neuro/Skin/Heme-Lymph-Imm. Modified physical exam through Telemedicine camera    General: sleepy   Neck: no obvious neck mass. No obvious neck deformity     CVS: no distress   Chest: no distress.  Chest is moving with respiration    Extremities:  no visible tremor  Skin: No visible rashes      Review of Laboratory Data:  I personally reviewed the following labs:   Recent Labs     04/11/22 0451 04/12/22 0453 04/13/22 0443   WBC 4.4* 4.4* 5.2   RBC 3.74 3.85 3.49*   HGB 9.7* 10.0* 8.8*   HCT 31.1* 31.9* 28.2*   MCV 83.2 82.9 80.8   MCH 25.9* 26.0 25.2*   MCHC 31.2* 31.3* 31.2*   RDW 18.6* 18.7* 19.6*    202 184   MPV 10.1 10.3 10.2     Recent Labs     04/11/22 0451 04/11/22 0451 04/12/22 0453 04/13/22  0443 04/13/22  0831      < > 140 132 134   K 4.5   < > 4.3 4.2 4.4      < > 104 97* 97*   CO2 24   < > 23 25 24   BUN 10   < > 14 8 9   CREATININE 3.3*   < > 4.2* 2.4* 2.6*   GLUCOSE 177*   < > 29* 268* 328*   CALCIUM 9.2   < > 9.3 8.4* 9.0   PROT 6.8  --  7.2 6.4  --    LABALBU 3.5  -- 3.5 3.3*  --    BILITOT 0.4  --  0.4 0.3  --    ALKPHOS 156*  --  159* 143*  --    AST 11  --  10 8  --    ALT 9  --  7 7  --     < > = values in this interval not displayed. Beta-Hydroxybutyrate   Date Value Ref Range Status   04/13/2022 2.56 (H) 0.02 - 0.27 mmol/L Final   04/06/2022 >4.50 (H) 0.02 - 0.27 mmol/L Final   03/29/2022 >4.50 (H) 0.02 - 0.27 mmol/L Final     Lab Results   Component Value Date    LABA1C 7.7 03/02/2022    LABA1C 8.7 12/08/2021    LABA1C 10.2 11/23/2021     Lab Results   Component Value Date/Time    TSH 5.370 (H) 03/28/2022 01:56 AM    T4FREE 1.10 03/28/2022 01:56 AM    G3PTZJH 8.6 11/05/2015 02:20 AM    FT3 1.3 (L) 10/31/2020 10:43 AM    E2CJJYZ 36.73 (L) 07/20/2020 02:00 PM     Lab Results   Component Value Date    LABA1C 7.7 03/02/2022    GLUCOSE 328 04/13/2022    GLUCOSE 130 05/18/2012    MALBCR 2949.3 01/15/2020    LABMICR 1740.1 01/15/2020    LABCREA 59 01/15/2020    LABCREA 61 01/15/2020     Lab Results   Component Value Date    TRIG 82 01/14/2020    HDL 33 01/14/2020    LDLCALC 46 01/14/2020    CHOL 95 01/14/2020       Blood culture   Lab Results   Component Value Date    BC 5 Days no growth 03/28/2022    BC 5 Days no growth 03/27/2022       Radiology:  XR ABDOMEN (KUB) (SINGLE AP VIEW)   Final Result   No evidence of bowel obstruction. XR CHEST PORTABLE   Final Result   1. The lungs are clear. There is no infiltrate or effusion. 2. Stable position of the support lines and tubes. XR CHEST PORTABLE   Final Result   1. There is no acute cardiopulmonary disease   2. Stable position of the support lines and tubes. XR CHEST PORTABLE   Final Result   Infiltrate and or atelectasis at the left lower lobe. Substantially resolved   infiltrate and or atelectasis on the right. New NG tube. CT HEAD WO CONTRAST   Final Result   No acute intracranial abnormality or hemorrhage.          CT ABDOMEN PELVIS WO CONTRAST Additional Contrast? None   Final Result 1.  Moderate ascites throughout abdomen and pelvis. 2.  Multiple thick walled loops of small bowel throughout the abdomen could   suggest nonspecific infectious or inflammatory enteritis. 3.  Generalized body wall edema. 4.  Small bilateral pleural effusions with adjacent atelectatic changes. XR ABDOMEN FOR NG/OG/NE TUBE PLACEMENT   Final Result   Enteric tube tip in the body of the stomach. XR CHEST PORTABLE   Final Result   Right perihilar opacity. Medical Records/Labs/Images review:   I personally reviewed and summarized previous records   All labs and imaging were reviewed independently     Mary Maldonado, a 36 y.o.-old female seen today for inpatient diabetes management     Diabetes Mellitus type I    · Hypoglycemia continued even with very low insulin dose. Likely due to poor nutrition   · Poor nutrition is the main issue    · we recommend the following sq insulin regimen   · Restart lantus 1u daily   · Modified Low dose sliding scale (1u:100>200) with meals  · Continue following BG and adjust insulin regimen    Poor nutrition   · primary team to discuss with family distal feeding using NJ tube     primary Hypothyroidism  · Levothyroxine was adjusted during this admission to 88 mcg daily      ESRD  · Pt at risk for hypoglycemia  · On dialysis, nephrology following    Thank you for allowing us to participate in the care of this patient. Please do not hesitate to contact us with any additional questions. Renita Delaney MD  Endocrinologist, [facility] Diabetes [facility] and Endocrinology   [address] [facility] [address]   Phone: [phone]  Fax: [phone]  ----------------------------  An electronic signature was used to authenticate this note.  Franko Capone MD on 4/13/2022 at 7:30 PM

## 2022-04-13 NOTE — PROGRESS NOTES
Patient ate 0% of breakfast and lunch. Encouraged and educated on the importance of eating her meals. Per dietary, she refused to order a dinner tray this afternoon. This RN ordered her mac n cheese and will try to encourage her to eat dinner tonight.

## 2022-04-13 NOTE — PROGRESS NOTES
Left a message with Dr. Oscar Lundborg office re blood glucose levels and question if Lantus should be restarted.

## 2022-04-13 NOTE — TELEPHONE ENCOUNTER
----- Message from Russell Silva sent at 4/13/2022 10:19 AM EDT -----  Subject: Message to Provider    QUESTIONS  Information for Provider? Lluvia Carrasco from [place] wanted to call and let   Dr. Esmer Jaimes know that pt has been at [facility] since 03/27 with a   diagnosis of altered mental status. Lluvia Carrasco stated if there are any   questions, to please give her a call at [phone]. Thank you  ---------------------------------------------------------------------------  --------------  CALL BACK INFO  What is the best way for the office to contact you? OK to leave message on   voicemail  Preferred Call Back Phone Number? [phone]  ---------------------------------------------------------------------------  --------------  SCRIPT ANSWERS  Relationship to Patient? Third Party  Third Party Type? Insurance? Representative Name?  Lluvia Carrasco

## 2022-04-13 NOTE — PROGRESS NOTES
Comprehensive Nutrition Assessment    Type and Reason for Visit:  Reassess    Nutrition Recommendations/Plan: Continue current diet order. Recommend small frequent meals utilizing preference foods. Modify ONS regimen to Magic cup BID to further encourage intake. Will monitor for any changes in feeding modality if po intake does not improve/if medically appropriate. Please consult RD as needed/pending POC. Nutrition Assessment:  Pt. remains at nurtional risk d/t ongoing poor po intake. Pt. remains w/ severe hypoglycemia likely 2/2 Severe gastroparesis /poor intake. Noted consideration of distal feeding tube for POC. Pt. admit w/ acute encephalopathy & AMS 2/2 hypoglycemia; pt s/p extubated 3/30; noted ESRD (on HD); hx of DM, Ketoacidosis 2/2 DM, drug abuse, non-compliance w/ meds, malnutrition. Noted pt. remains w/ nausea/abd pain/poor appetite. intakes x2 noted for 26-50% (mac and cheese) and %. Will Modify ONS and monitor intake/for changes in feeding modality. Malnutrition Assessment:  Malnutrition Status: At risk for malnutrition (Comment)    Context:  Chronic Illness     Findings of the 6 clinical characteristics of malnutrition:  Energy Intake:  Mild decrease in energy intake (Comment)  Weight Loss:  Unable to assess (d/t wt flucc 2/2 ESRD + HD; measured wt hx ranging from 129-169# within the past yr)     Body Fat Loss:  Unable to assess     Muscle Mass Loss:  Unable to assess    Fluid Accumulation:  No significant fluid accumulation     Strength:  Not Performed    Estimated Daily Nutrient Needs:  Energy (kcal):  7124-9149; Weight Used for Energy Requirements:  Current     Protein (g):  73-85g (1.2-1.4g/kgCBW (ESRDon HD as tolerated));  Weight Used for Protein Requirements:  Current        Fluid (ml/day):  per renal management; Method Used for Fluid Requirements:  Standard Renal      Nutrition Related Findings:  A&Ox4; active BS; no edema; -I/O 2.4L, tender abd, Nausea and loss of appetite , hypophosphatemia, Hyperglycemia, Oral Candidiasis      Wounds:  None       Current Nutrition Therapies:    ADULT DIET; Regular; 4 carb choices (60 gm/meal)  ADULT ORAL NUTRITION SUPPLEMENT; Lunch, Breakfast; Renal Oral Supplement  ADULT ORAL NUTRITION SUPPLEMENT; Dinner, Lunch; Frozen Oral Supplement    Anthropometric Measures:  · Height: 5' 4\" (162.6 cm)  · Current Body Weight: 133 lb 9.6 oz (60.6 kg) (4/09 BS)   · Admission Body Weight: 138 lb 3.7 oz (62.7 kg) (3/28-BS)    · Usual Body Weight:  (note w/in 1yr wts range from 129#-169# possibly d/t fluid shifts 2/2 ESRD + HD)     · Ideal Body Weight: 120 lbs; % Ideal Body Weight 117.5 %   · BMI: 22.9  · BMI Categories: Normal Weight (BMI 18.5-24. 9)       Nutrition Diagnosis:   · Inadequate oral intake related to altered GI function as evidenced by NPO or clear liquid status due to medical condition,vomiting,nausea,diarrhea,intake 0-25%      Nutrition Interventions:   Food and/or Nutrient Delivery:  Continue Current Diet,Modify Oral Nutrition Supplement (Magic cup BID , nepro BID)  Nutrition Education/Counseling:  Education not indicated   Coordination of Nutrition Care:  Continue to monitor while inpatient    Goals:  Pt. to tolerate diet and consume >50% most meals       Nutrition Monitoring and Evaluation:   Behavioral-Environmental Outcomes:  None Identified   Food/Nutrient Intake Outcomes:  Food and Nutrient Intake,Supplement Intake  Physical Signs/Symptoms Outcomes:  Biochemical Data,Nutrition Focused Physical Findings,Skin,Weight,Chewing or Swallowing,GI Status,Fluid Status or Edema     Discharge Planning:     Too soon to determine     Electronically signed by Holden Del Angel RD on 4/13/22 at 1:11 PM EDT    Contact: ext [phone]

## 2022-04-13 NOTE — PROGRESS NOTES
IM resident notified that nobody on call for new GI consults for the next few days per Dr. Rahul Mendez.

## 2022-04-13 NOTE — PLAN OF CARE
Problem: Serum Glucose Level - Abnormal:  Goal: Ability to maintain appropriate glucose levels has stabilized  Description: Ability to maintain appropriate glucose levels has stabilized  Outcome: Met This Shift     Problem: Cardiac Output - Decreased:  Goal: Hemodynamic stability will improve  Description: Hemodynamic stability will improve  Outcome: Met This Shift     Problem: Pain:  Goal: Pain level will decrease  Description: Pain level will decrease  4/13/2022 1551 by Homa Weiss  Outcome: Met This Shift     Problem: Pain:  Goal: Control of acute pain  Description: Control of acute pain  4/13/2022 1551 by Homa Weiss  Outcome: Met This Shift

## 2022-04-13 NOTE — CARE COORDINATION
Spoke to patient's mother per phone and she verbaized many concerns about her daughter. Dr. Ingris rock served to notify him of her concerns. GI consult ordered . Patient currently on room air with puse ox of 94%. Also received phone call from patient's  Porter Neville at  and update given. Discussed [address] with patient's mother and she is interested  in obtaining [address] aides from [place] and discharge plan remains home. CM/SW will continue to follow.

## 2022-04-13 NOTE — PROGRESS NOTES
(miracle) mouthwash, diphenhydrAMINE, sodium chloride, sodium chloride, ondansetron, trimethobenzamide, sodium chloride, loperamide, sodium chloride, sodium chloride flush, sodium chloride, heparin flush, white petrolatum, polyethylene glycol, glucose, dextrose, glucagon (rDNA), dextrose, albuterol, labetalol    DATA:    CBC:   Lab Results   Component Value Date    WBC 5.2 04/13/2022    RBC 3.49 04/13/2022    HGB 8.8 04/13/2022    HCT 28.2 04/13/2022    MCV 80.8 04/13/2022    MCH 25.2 04/13/2022    MCHC 31.2 04/13/2022    RDW 19.6 04/13/2022     04/13/2022    MPV 10.2 04/13/2022     CMP:    Lab Results   Component Value Date     04/13/2022    K 4.4 04/13/2022    K 4.2 04/13/2022    CL 97 04/13/2022    CO2 24 04/13/2022    BUN 9 04/13/2022    CREATININE 2.6 04/13/2022    GFRAA 26 04/13/2022    LABGLOM 26 04/13/2022    GLUCOSE 328 04/13/2022    GLUCOSE 130 05/18/2012    PROT 6.4 04/13/2022    LABALBU 3.3 04/13/2022    LABALBU 4.1 05/18/2012    CALCIUM 9.0 04/13/2022    BILITOT 0.3 04/13/2022    ALKPHOS 143 04/13/2022    AST 8 04/13/2022    ALT 7 04/13/2022     Magnesium:    Lab Results   Component Value Date    MG 1.9 04/13/2022     Phosphorus:    Lab Results   Component Value Date    PHOS 1.7 04/13/2022     Radiology Review:      CT Head WO Contrast March 27, 2022   No acute intracranial abnormality or hemorrhage.         CT Abdomen Pelvis WO Contrast March 27, 2022   1.  Moderate ascites throughout abdomen and pelvis.       2.  Multiple thick walled loops of small bowel throughout the abdomen could   suggest nonspecific infectious or inflammatory enteritis.       3.  Generalized body wall edema.       4.  Small bilateral pleural effusions with adjacent atelectatic changes.           Chest X-Ray March 28, 2022   Infiltrate and or atelectasis at the left lower lobe.  Substantially resolved   infiltrate and or atelectasis on the right.  New NG tube.           BRIEF SUMMARY OF INITIAL CONSULT:    Briefly, Miss Genoveva Lozano is a 36year-old female with a PMH of ESRD on hemodialysis 3 days/wk, on TTS schedule at [facility] I DM, poorly controlled with recurrent admissions for DKA, HTN, gastroparesis, ascites, infective endocarditis, cardiac arrest, hypothyroidism, recent Covid, and depression who was admitted on March 27, 2022 after she was found unresponsive at the SNF where she resides. Patient was found to be profoundly hypoglycemic and EMS was called. She was intubated in ER for airway protection as she remained unresponsive. CT head showed no acute intracranial abnormality or hemorrhage, chest x-ray demonstrates right perihilar opacity concerning for aspiration pneumonia. She was ultimately admitted to MICU for further evaluation. Labs on admission were significant for sodium 135, potassium 5.0, chloride 100, bicarbonate 25, BUN 38, creatinine 4.9, proBNP >70,000. We are consulted for dialysis management. Problems resolved. · Hypoglycemia, was on D10, now hyperglycemic with +ketones (beta-hydroxybutyrate >4.5)  · Acute respiratory failure, 2/2 aspiration pneumonia? On 40% Fio2. Extubated 4/51  · Acute metabolic encephalopathy 2/2 hypoglycemia. Resolving   · Acidemia, pH 7.311, PCO2 35.0, HCO3 12.9, metabolic acidosis from DKA  · Hx recurrent C. difficile infection, on flagyl po Q 8hrs   · Hx of MDRO UTI    IMPRESSION/RECOMMENDATIONS:      1. ESKD 3 times a week TTS via RIJ tunneled dialysis catheter. 2. HTN, on carvediolol 6.25 mg bid  3. MBD of CKD, currently with hypophosphatemia. Binders d/c'd, to replace. 4. Anemia of CKD, continue epoetin alpha 3,000 unit 3 times/wk. s/p transfusion   5. Vitamin D deficiency, on ergocalciferol 1000 po daily  ------------------------------------------------------  6. Type I DM, poorly controlled with frequent readmissions for DKA, on SSI, lantus decreased to 1 unit nightly  7. Aspiration pneumonia? S/p piperacillin    8.  Restless leg syndrome, on ropinirole at home  9. Depression, on escitalopram and Abilify  10. Hypothyroidism, on levothyroxine   11. History of gastroparesis, on metoclopramide at home.   For EGD and pyloric dilatation with Botox injection outpatient per GI.       Plan:     · Continue HD three times/wk, TTS while inpatient, for dialysis tomorrow  · Continue epoetin alpha 3,000 units 3 times/wk  · Carvedilol 6.25 mg bid  · Discontinue diltiazem   · Continue to monitor glucose levels  · Replace phosphorus  · Continue to monitor phosphorus levels   · Monitor BP  · Discharge planning      Electronically signed by HEBER Manrique CNP on 4/13/2022 at 12:23 PM

## 2022-04-14 LAB
ALBUMIN SERPL-MCNC: 3.4 G/DL (ref 3.5–5.2)
ALP BLD-CCNC: 144 U/L (ref 35–104)
ALT SERPL-CCNC: 7 U/L (ref 0–32)
ANION GAP SERPL CALCULATED.3IONS-SCNC: 8 MMOL/L (ref 7–16)
AST SERPL-CCNC: 8 U/L (ref 0–31)
BASOPHILS ABSOLUTE: 0.04 E9/L (ref 0–0.2)
BASOPHILS RELATIVE PERCENT: 0.8 % (ref 0–2)
BILIRUB SERPL-MCNC: 0.3 MG/DL (ref 0–1.2)
BILIRUBIN DIRECT: <0.2 MG/DL (ref 0–0.3)
BILIRUBIN, INDIRECT: ABNORMAL MG/DL (ref 0–1)
BUN BLDV-MCNC: 14 MG/DL (ref 6–20)
CALCIUM SERPL-MCNC: 8.8 MG/DL (ref 8.6–10.2)
CHLORIDE BLD-SCNC: 100 MMOL/L (ref 98–107)
CO2: 29 MMOL/L (ref 22–29)
CREAT SERPL-MCNC: 3.4 MG/DL (ref 0.5–1)
EOSINOPHILS ABSOLUTE: 0.13 E9/L (ref 0.05–0.5)
EOSINOPHILS RELATIVE PERCENT: 2.6 % (ref 0–6)
GFR AFRICAN AMERICAN: 19
GFR NON-AFRICAN AMERICAN: 19 ML/MIN/1.73
GLUCOSE BLD-MCNC: 45 MG/DL (ref 74–99)
HCT VFR BLD CALC: 29.9 % (ref 34–48)
HEMOGLOBIN: 9.5 G/DL (ref 11.5–15.5)
IMMATURE GRANULOCYTES #: 0.02 E9/L
IMMATURE GRANULOCYTES %: 0.4 % (ref 0–5)
LYMPHOCYTES ABSOLUTE: 1.39 E9/L (ref 1.5–4)
LYMPHOCYTES RELATIVE PERCENT: 27.5 % (ref 20–42)
MAGNESIUM: 2 MG/DL (ref 1.6–2.6)
MCH RBC QN AUTO: 25.5 PG (ref 26–35)
MCHC RBC AUTO-ENTMCNC: 31.8 % (ref 32–34.5)
MCV RBC AUTO: 80.4 FL (ref 80–99.9)
METER GLUCOSE: 115 MG/DL (ref 74–99)
METER GLUCOSE: 141 MG/DL (ref 74–99)
METER GLUCOSE: 187 MG/DL (ref 74–99)
METER GLUCOSE: 291 MG/DL (ref 74–99)
METER GLUCOSE: 47 MG/DL (ref 74–99)
METER GLUCOSE: 56 MG/DL (ref 74–99)
METER GLUCOSE: 66 MG/DL (ref 74–99)
METER GLUCOSE: 76 MG/DL (ref 74–99)
MONOCYTES ABSOLUTE: 0.33 E9/L (ref 0.1–0.95)
MONOCYTES RELATIVE PERCENT: 6.5 % (ref 2–12)
NEUTROPHILS ABSOLUTE: 3.14 E9/L (ref 1.8–7.3)
NEUTROPHILS RELATIVE PERCENT: 62.2 % (ref 43–80)
PDW BLD-RTO: 19.2 FL (ref 11.5–15)
PHOSPHORUS: 3.5 MG/DL (ref 2.5–4.5)
PLATELET # BLD: 194 E9/L (ref 130–450)
PMV BLD AUTO: 10.2 FL (ref 7–12)
POTASSIUM REFLEX MAGNESIUM: 4 MMOL/L (ref 3.5–5)
RBC # BLD: 3.72 E12/L (ref 3.5–5.5)
SODIUM BLD-SCNC: 137 MMOL/L (ref 132–146)
TOTAL PROTEIN: 6.5 G/DL (ref 6.4–8.3)
WBC # BLD: 5.1 E9/L (ref 4.5–11.5)

## 2022-04-14 PROCEDURE — 80053 COMPREHEN METABOLIC PANEL: CPT

## 2022-04-14 PROCEDURE — 6360000002 HC RX W HCPCS: Performed by: INTERNAL MEDICINE

## 2022-04-14 PROCEDURE — 83735 ASSAY OF MAGNESIUM: CPT

## 2022-04-14 PROCEDURE — 99232 SBSQ HOSP IP/OBS MODERATE 35: CPT | Performed by: INTERNAL MEDICINE

## 2022-04-14 PROCEDURE — 6370000000 HC RX 637 (ALT 250 FOR IP): Performed by: INTERNAL MEDICINE

## 2022-04-14 PROCEDURE — 90935 HEMODIALYSIS ONE EVALUATION: CPT

## 2022-04-14 PROCEDURE — 1200000000 HC SEMI PRIVATE

## 2022-04-14 PROCEDURE — 6370000000 HC RX 637 (ALT 250 FOR IP): Performed by: STUDENT IN AN ORGANIZED HEALTH CARE EDUCATION/TRAINING PROGRAM

## 2022-04-14 PROCEDURE — 80076 HEPATIC FUNCTION PANEL: CPT

## 2022-04-14 PROCEDURE — 2500000003 HC RX 250 WO HCPCS: Performed by: INTERNAL MEDICINE

## 2022-04-14 PROCEDURE — 36415 COLL VENOUS BLD VENIPUNCTURE: CPT

## 2022-04-14 PROCEDURE — 6360000002 HC RX W HCPCS: Performed by: STUDENT IN AN ORGANIZED HEALTH CARE EDUCATION/TRAINING PROGRAM

## 2022-04-14 PROCEDURE — 84100 ASSAY OF PHOSPHORUS: CPT

## 2022-04-14 PROCEDURE — 99252 IP/OBS CONSLTJ NEW/EST SF 35: CPT | Performed by: STUDENT IN AN ORGANIZED HEALTH CARE EDUCATION/TRAINING PROGRAM

## 2022-04-14 PROCEDURE — 82962 GLUCOSE BLOOD TEST: CPT

## 2022-04-14 PROCEDURE — 6370000000 HC RX 637 (ALT 250 FOR IP): Performed by: NURSE PRACTITIONER

## 2022-04-14 PROCEDURE — 85025 COMPLETE CBC W/AUTO DIFF WBC: CPT

## 2022-04-14 PROCEDURE — 2580000003 HC RX 258: Performed by: INTERNAL MEDICINE

## 2022-04-14 RX ORDER — PANTOPRAZOLE SODIUM 40 MG/1
40 TABLET, DELAYED RELEASE ORAL
Status: DISCONTINUED | OUTPATIENT
Start: 2022-04-14 | End: 2022-04-21

## 2022-04-14 RX ORDER — METOCLOPRAMIDE 5 MG/1
5 TABLET ORAL 4 TIMES DAILY
Status: DISCONTINUED | OUTPATIENT
Start: 2022-04-14 | End: 2022-04-18

## 2022-04-14 RX ORDER — DIPHENHYDRAMINE HYDROCHLORIDE 50 MG/ML
25 INJECTION INTRAMUSCULAR; INTRAVENOUS EVERY 6 HOURS PRN
Status: DISCONTINUED | OUTPATIENT
Start: 2022-04-14 | End: 2022-04-25 | Stop reason: HOSPADM

## 2022-04-14 RX ADMIN — ROPINIROLE 0.25 MG: 0.25 TABLET, FILM COATED ORAL at 21:13

## 2022-04-14 RX ADMIN — Medication 4 EACH: at 14:10

## 2022-04-14 RX ADMIN — LEVOTHYROXINE SODIUM 88 MCG: 0.09 TABLET ORAL at 06:28

## 2022-04-14 RX ADMIN — METOCLOPRAMIDE 5 MG: 5 TABLET ORAL at 17:16

## 2022-04-14 RX ADMIN — Medication 10 ML: at 21:36

## 2022-04-14 RX ADMIN — Medication 1000 UNITS: at 13:22

## 2022-04-14 RX ADMIN — ACETAMINOPHEN 650 MG: 325 TABLET ORAL at 06:28

## 2022-04-14 RX ADMIN — SODIUM CHLORIDE, PRESERVATIVE FREE 100 UNITS: 5 INJECTION INTRAVENOUS at 21:12

## 2022-04-14 RX ADMIN — PROMETHAZINE HYDROCHLORIDE 25 MG: 25 TABLET ORAL at 13:22

## 2022-04-14 RX ADMIN — PROMETHAZINE HYDROCHLORIDE 25 MG: 25 TABLET ORAL at 17:16

## 2022-04-14 RX ADMIN — ACETAMINOPHEN 650 MG: 325 TABLET ORAL at 13:20

## 2022-04-14 RX ADMIN — CARVEDILOL 6.25 MG: 6.25 TABLET, FILM COATED ORAL at 21:13

## 2022-04-14 RX ADMIN — METOCLOPRAMIDE 5 MG: 5 TABLET ORAL at 21:12

## 2022-04-14 RX ADMIN — CARVEDILOL 6.25 MG: 6.25 TABLET, FILM COATED ORAL at 13:21

## 2022-04-14 RX ADMIN — METOCLOPRAMIDE 5 MG: 5 TABLET ORAL at 13:21

## 2022-04-14 RX ADMIN — PROMETHAZINE HYDROCHLORIDE 25 MG: 25 TABLET ORAL at 06:28

## 2022-04-14 RX ADMIN — ACETAMINOPHEN 650 MG: 325 TABLET ORAL at 17:17

## 2022-04-14 RX ADMIN — MIRTAZAPINE 15 MG: 15 TABLET, FILM COATED ORAL at 21:14

## 2022-04-14 RX ADMIN — ACETAMINOPHEN 650 MG: 325 TABLET ORAL at 23:48

## 2022-04-14 RX ADMIN — ARIPIPRAZOLE 5 MG: 5 TABLET ORAL at 21:12

## 2022-04-14 RX ADMIN — Medication 4 EACH: at 13:33

## 2022-04-14 RX ADMIN — DIPHENHYDRAMINE HYDROCHLORIDE 25 MG: 25 TABLET ORAL at 18:52

## 2022-04-14 RX ADMIN — Medication 10 ML: at 13:23

## 2022-04-14 RX ADMIN — Medication 12.5 G: at 06:37

## 2022-04-14 RX ADMIN — Medication 6 MG: at 21:12

## 2022-04-14 RX ADMIN — ESCITALOPRAM 10 MG: 10 TABLET, FILM COATED ORAL at 13:21

## 2022-04-14 RX ADMIN — SODIUM CHLORIDE, PRESERVATIVE FREE 100 UNITS: 5 INJECTION INTRAVENOUS at 13:22

## 2022-04-14 RX ADMIN — PANTOPRAZOLE SODIUM 40 MG: 40 TABLET, DELAYED RELEASE ORAL at 17:17

## 2022-04-14 RX ADMIN — DIPHENHYDRAMINE HYDROCHLORIDE 25 MG: 50 INJECTION, SOLUTION INTRAMUSCULAR; INTRAVENOUS at 22:02

## 2022-04-14 ASSESSMENT — PAIN SCALES - GENERAL
PAINLEVEL_OUTOF10: 0
PAINLEVEL_OUTOF10: 2
PAINLEVEL_OUTOF10: 0
PAINLEVEL_OUTOF10: 4
PAINLEVEL_OUTOF10: 0
PAINLEVEL_OUTOF10: 0
PAINLEVEL_OUTOF10: 3
PAINLEVEL_OUTOF10: 5

## 2022-04-14 NOTE — PROGRESS NOTES
[facility]  Internal Medicine Residency / [address]    Attending Physician Statement  I have discussed the case, including pertinent history and exam findings with the resident and the team.  I have seen and examined the patient and the key elements of the encounter have been performed by me. I agree with the assessment, plan and orders as documented by the resident. Principal Problem:    Hypoglycemia  Active Problems:    End stage renal disease (HCC)    Gastroparesis  Resolved Problems:    Altered mental status    Acute metabolic encephalopathy    Appreciate GI recommendations for temporary NPO status, PPI BID, continue Reglan while in hospital.    Short term goal is discharge home once guidance regarding insulin dosing finalized per endocrinology. Long term she may need some form of post-pyloric feeding to regulate glycemic control. Perhaps trial of Corpak and if success a PEJ tube. Remainder of medical problems as per resident note.       Fortunato Vu DO  Internal Medicine Residency Faculty

## 2022-04-14 NOTE — CARE COORDINATION
4/14 Update CM note. Patient in dialysis this AM. Patient was also hypoglycemic with BGL 47, repeat 115. PO intake remains poor. Referral was made on 4/12 to [facility] at [facility] for home oxygen and order placed but patient has been on RA since 4/10. Order discontinued. GI was consulted yesterday d/t patient's mothers request but there is currently no GI coverage. Plan is for patient to return home on discharge instead of [facility] Previous CM documentation stated patient's mother is interested in aide services through insurance. Referral made to [facility] for possible assistance with bathing, dressing, grooming, and meal prep.  LM with patient's mother [facility] regarding referral.    [id number]  Received call from patient's mother, she confirmed plan is to return home on discharge and is aware of [facility] referral.    AVILA DoranN, RN  [facility] Case Management   Cell: [phone]

## 2022-04-14 NOTE — PROGRESS NOTES
[facility]  Internal Medicine Residency Program  Progress Note - House Team 2    Patient:  Fidel Coon 36 y.o. female MRN: [id number]     Date of Service: 4/14/2022     CC: AMS   Overnight events: NAEO     Subjective      On day 18 of hospital admission    Patient seen at dialysis this AM   Patient continues to complain of abdominal pain and nausea    Objective     Physical Exam:  · Vitals: BP (!) 137/92   Pulse 87   Temp 98.3 °F (36.8 °C) (Oral)   Resp 16   Ht 5' 4\" (1.626 m)   Wt 133 lb 9.6 oz (60.6 kg)   SpO2 94%   BMI 22.93 kg/m²       · Constitutional: Awake, alert, able to answer questions. Follows commands. In no apparent distress. · Head: Normocephalic and atraumatic. · Eyes: EOMI, conjunctiva normal, (-) scleral icterus. Mucus membranes moist.  · Mouth: Mucus membranes moist. Oropharynx clear. No deviation of the tongue or uvula. Normal dentition. Oral thrush (+)  · Neck: (-)  swelling, (-) JVD, trachea midline  · Respiratory: Lungs clear to auscultation bilaterally. (-)  wheezes, (-)  rales, (-)  rhonchi. No increased work of breathing or respiratory distress  · Cardiovascular: RRR. (-)  murmurs, (-) gallops,  (-) rubs. S1 and S2 were normal.   · GI:  Abdomen soft, non-tender, non-distended. (+) BS. (-) guarding, (-) rigidity. · Extremities: Warm and well perfused. (-) clubbing, (-) cyanosis. 2+ distal pulses. (-) peripheral edema. (-)Sacral pitting edema. · Neurologic:  No focal neurological deficits.   No tremor or overt seizure activity    Pertinent Labs & Imaging Studies   jorge alberto  CBC with Differential:    Lab Results   Component Value Date    WBC 5.1 04/14/2022    RBC 3.72 04/14/2022    HGB 9.5 04/14/2022    HCT 29.9 04/14/2022     04/14/2022    MCV 80.4 04/14/2022    MCH 25.5 04/14/2022    MCHC 31.8 04/14/2022    RDW 19.2 04/14/2022    NRBC 0.9 03/28/2022    SEGSPCT 67 06/27/2013    METASPCT 1.7 08/10/2020    LYMPHOPCT 27.5 04/14/2022    MONOPCT 6.5 04/14/2022    MYELOPCT 0.9 01/19/2022    BASOPCT 0.8 04/14/2022    MONOSABS 0.33 04/14/2022    LYMPHSABS 1.39 04/14/2022    EOSABS 0.13 04/14/2022    BASOSABS 0.04 04/14/2022     CMP:    Lab Results   Component Value Date     04/14/2022    K 4.0 04/14/2022     04/14/2022    CO2 29 04/14/2022    BUN 14 04/14/2022    CREATININE 3.4 04/14/2022    GFRAA 19 04/14/2022    LABGLOM 19 04/14/2022    GLUCOSE 45 04/14/2022    GLUCOSE 130 05/18/2012    PROT 6.5 04/14/2022    LABALBU 3.4 04/14/2022    LABALBU 4.1 05/18/2012    CALCIUM 8.8 04/14/2022    BILITOT 0.3 04/14/2022    ALKPHOS 144 04/14/2022    AST 8 04/14/2022    ALT 7 04/14/2022     Magnesium:    Lab Results   Component Value Date    MG 2.0 04/14/2022     Phosphorus:    Lab Results   Component Value Date    PHOS 3.5 04/14/2022     Troponin:    Lab Results   Component Value Date    TROPONINI 0.12 05/14/2021     Resident's Assessment and Plan     Assessment     1. Nausea/Vomiting w/ Abdominal pain with Hx of gastroparesis 2/2 Hx of IDDM  2. HTN  3. ESRD on HD TTS  4. IDDM1-A1c 7.7% this admission  5. Hypothyroidism-TSH 5.37, FT4 1.10 normal  6. Acute on chronic anemia-s/p 3 unit PRBCs, chronic component likely due to ESRD  7. History of MDRO UTI  8. History of C. difficile infection  9. Oral Candidiasis    Resolved/Stable Problems:   · Acute hypoxic respiratory failure   · Acute metabolic encephalopathy-likely due to hypoglycemia in setting of DM1 and ESRD (CTH negative, negative UDS/SDS, NH3 normal, B12 normal 1 month ago)  · Aspiration pneumonia     Plan   Pepcid, Reglan and Zofran to aid w/ abdominal pain/dyspepsia.  Per Endocrine, continue on 1U Lantus nightly w/ modified SSI. [place] w/ Endocrine - Dr. Tierney Jones. Plan to continue f/u as outpatient when pt discharged. o Per Endo, upon discharge, patient to be on Lantus 1u nighty with current sliding scale with meals.  Patient is NOT to be on bedtime sliding scale   Continue Synthroid 88 mcg daily   HD TThS per nephro   Continue Abilify and Celexa   Glucose checks q4hrs   Switched Cardizem to Coreg 6.25mg BID in concerns for delayed gastric emptying   Oral candidiasis appreciated on physical exam - oral mouthwash ordered   Appreciate Endo, nephro, recommendations   GI consult in concerns for gastroparesis  o Per GI recs, patient on PPI BID        PT/OT evaluation: Not indicated at this time  DVT prophylaxis/ GI prophylaxis: Heparin/Pepcid  Disposition: Continue current care    Bonnie Zarco MD, PGY-1  Attending physician: Dr. Elver Olson

## 2022-04-14 NOTE — PROGRESS NOTES
gastroparesis in the setting of fluctuating glucose per Dr. Matt Blake. Message sent to Dr. Gina Hale for new consult for Dr. Dayton Burnett .  Above message resent to Dr. Dayton Burnett

## 2022-04-14 NOTE — PROGRESS NOTES
Department of Internal Medicine  Nephrology Progress Note      Events reviewed. Patient had hypoglycemic event this am.      SUBJECTIVE:  We are following Miss Queta Mackey for ESKD on HD. Patient seen on HD, tolerating treatment. Patient reports nausea and not eating well.      PHYSICAL EXAM:      Vitals:    VITALS:  BP (!) 152/83   Pulse 82   Temp 98.3 °F (36.8 °C)   Resp 16   Ht 5' 4\" (1.626 m)   Wt 133 lb 9.6 oz (60.6 kg)   SpO2 94%   BMI 22.93 kg/m²   24HR INTAKE/OUTPUT:      Intake/Output Summary (Last 24 hours) at 4/14/2022 1040  Last data filed at 4/13/2022 2050  Gross per 24 hour   Intake 60 ml   Output --   Net 60 ml       Access: RIJ tunneled dialysis catheter  Constitutional: Awake, alert, NAD  HEENT:  PERRL, normocephalic, atraumatic  Respiratory:  CTA bilateral  Cardiovascular/Edema:  ST, RRR, no murmur gallop or rub  Gastrointestinal:  abd distended, c/o persistent abdominal pain  Neurologic: A&OX3 follows commands, no focal deficit  Skin:  Warm, dry, ecchymotic areas to abdomen    Other:    Scheduled Meds:   carvedilol  6.25 mg Oral BID    famotidine  10 mg Oral BID    insulin glargine  1 Units SubCUTAneous Daily    metoclopramide  10 mg Oral 4x Daily    promethazine  25 mg Oral TID AC    acetaminophen  650 mg Oral Q6H    Or    acetaminophen  650 mg Rectal Q6H    melatonin  6 mg Oral Nightly    insulin lispro  0-3 Units SubCUTAneous TID WC    sodium chloride flush  5-40 mL IntraVENous 2 times per day    heparin flush  1 mL IntraVENous 2 times per day    mirtazapine  15 mg Oral Nightly    escitalopram  10 mg Oral Daily    ARIPiprazole  5 mg Oral Nightly    rOPINIRole  0.25 mg Oral Nightly    levothyroxine  88 mcg Oral Daily    epoetin nena-epbx  3,000 Units SubCUTAneous Once per day on Mon Wed Fri    Vitamin D  1,000 Units Per NG tube Daily     Continuous Infusions:   sodium chloride      dextrose Stopped (04/12/22 0984)     PRN Meds:.magic (miracle) mouthwash, diphenhydrAMINE, ondansetron, trimethobenzamide, loperamide, sodium chloride flush, sodium chloride, heparin flush, white petrolatum, polyethylene glycol, glucose, dextrose, glucagon (rDNA), dextrose, albuterol, labetalol    DATA:    CBC:   Lab Results   Component Value Date    WBC 5.1 04/14/2022    RBC 3.72 04/14/2022    HGB 9.5 04/14/2022    HCT 29.9 04/14/2022    MCV 80.4 04/14/2022    MCH 25.5 04/14/2022    MCHC 31.8 04/14/2022    RDW 19.2 04/14/2022     04/14/2022    MPV 10.2 04/14/2022     CMP:    Lab Results   Component Value Date     04/14/2022    K 4.0 04/14/2022     04/14/2022    CO2 29 04/14/2022    BUN 14 04/14/2022    CREATININE 3.4 04/14/2022    GFRAA 19 04/14/2022    LABGLOM 19 04/14/2022    GLUCOSE 45 04/14/2022    GLUCOSE 130 05/18/2012    PROT 6.5 04/14/2022    LABALBU 3.4 04/14/2022    LABALBU 4.1 05/18/2012    CALCIUM 8.8 04/14/2022    BILITOT 0.3 04/14/2022    ALKPHOS 144 04/14/2022    AST 8 04/14/2022    ALT 7 04/14/2022     Magnesium:    Lab Results   Component Value Date    MG 2.0 04/14/2022     Phosphorus:    Lab Results   Component Value Date    PHOS 3.5 04/14/2022     Radiology Review:      CT Head WO Contrast March 27, 2022   No acute intracranial abnormality or hemorrhage.         CT Abdomen Pelvis WO Contrast March 27, 2022   1.  Moderate ascites throughout abdomen and pelvis.       2.  Multiple thick walled loops of small bowel throughout the abdomen could   suggest nonspecific infectious or inflammatory enteritis.       3.  Generalized body wall edema.       4.  Small bilateral pleural effusions with adjacent atelectatic changes.           Chest X-Ray March 28, 2022   Infiltrate and or atelectasis at the left lower lobe.  Substantially resolved   infiltrate and or atelectasis on the right.  New NG tube.           BRIEF SUMMARY OF INITIAL CONSULT:    Briefly, Miss Queta Mackey is a 36year-old female with a PMH of ESRD on hemodialysis 3 days/wk, on TTS schedule at [facility] DM, poorly controlled with recurrent admissions for DKA, HTN, gastroparesis, ascites, infective endocarditis, cardiac arrest, hypothyroidism, recent Covid, and depression who was admitted on March 27, 2022 after she was found unresponsive at the SNF where she resides. Patient was found to be profoundly hypoglycemic and EMS was called. She was intubated in ER for airway protection as she remained unresponsive. CT head showed no acute intracranial abnormality or hemorrhage, chest x-ray demonstrates right perihilar opacity concerning for aspiration pneumonia. She was ultimately admitted to MICU for further evaluation. Labs on admission were significant for sodium 135, potassium 5.0, chloride 100, bicarbonate 25, BUN 38, creatinine 4.9, proBNP >70,000. We are consulted for dialysis management. Problems resolved. · Hypoglycemia, was on D10, now hyperglycemic with +ketones (beta-hydroxybutyrate >4.5)  · Acute respiratory failure, 2/2 aspiration pneumonia? On 40% Fio2. Extubated 1/37  · Acute metabolic encephalopathy 2/2 hypoglycemia. Resolving   · Acidemia, pH 7.311, PCO2 35.0, HCO3 33.7, metabolic acidosis from DKA  · Hx recurrent C. difficile infection, on flagyl po Q 8hrs   · Hx of MDRO UTI  · Aspiration pneumonia? S/p piperacillin      IMPRESSION/RECOMMENDATIONS:      1. ESKD 3 times a week TTS via RIJ tunneled dialysis catheter. 2. HTN, on carvediolol 6.25 mg bid  3. MBD of CKD, currently with hypophosphatemia. Binders d/c'd, to replace. 4. Anemia of CKD, continue epoetin alpha 3,000 unit 3 times/wk. s/p transfusion   5. Vitamin D deficiency, on ergocalciferol 1000 po daily  ------------------------------------------------------  6. Type I DM, poorly controlled with frequent readmissions for DKA, on SSI, lantus decreased to 1 unit nightly   7. Restless leg syndrome, on ropinirole at home  8. Depression, on escitalopram and Abilify  9. Hypothyroidism, on levothyroxine   10.  History of gastroparesis, on

## 2022-04-14 NOTE — CONSULTS
History and Physical      ASSESSMENT AND PLAN:     31y/F w/ history of poorly controlled DMI c/b ESRD on HD and gastroparesis who presents w/ hypoglycemia and AMS.    PLAN:  1. Gastroparesis:  -The patient has been on bowel rest and reports having an appetite today. I would advance to Gastroparesis Diet and monitor PO intake.   -Okay to resume Reglan scheduled while inpatient.   -Continue PPI BID scheduled.   -We briefly discussed the endoscopic options of pyloric balloon dilation and botox injections. We also discussed the placement of a PEG-J tube which would allow for consistent feeding as well as venting of the stomach for symptom management. She will consider these options. I would not be able to offer these procedures until next week. -Ensure the patient is on a bowel regimen w/ daily bowel movements that do not require straining. I will follow peripherally.      Thank you for including us in the care of this patient. Please do not hesitate to contact us with any additional questions or concerns.     Nyasia Cheema MD  Gastroenterology/Hepatology  Advanced Endoscopy        HISTORY OF PRESENT ILLNESS:      Ms. Tavon Pickard is a 31y/F w/ history of poorly controlled , ESRD on HD, DMI c/b diabetic gastroparesis w/ several admissions for abdominal pain and poor PO intake consistent with a flare of diabetic gastroparesis who presents after being found unresponsive and hypoglycemic in her nursing facility. In early March, she was scheduled to undergo EGD w/ pyloric dilation and Botox injection w/ Dr. Cora Grimm but never made it to that appointment due to being hospitalized. Today she reports feeling well enough to eat some mac and cheese and reports that she remains with an appetite.      Past Medical History:        Diagnosis Date    Acute congestive heart failure ([address]     BC (acute kidney injury) ([facility] 10/01/2019    Cardiac arrest ([facility] 02/15/2021    Cephalgia 10/09/2019    Chronic kidney disease     Depression     Diabetes mellitus ([address]     Diabetic gastroparesis associated with type 1 diabetes mellitus ([address] 2018    Diabetic ketoacidosis ([address] 2011    Diabetic ketoacidosis with coma associated with type 1 diabetes mellitus ([address] 2013    Diabetic polyneuropathy associated with type 1 diabetes mellitus ([address] 2020    Drug use complicating pregnancy in third trimester     Endocarditis 10/31/2020    ESRD (end stage renal disease) ([address] 2020    H/O cardiovascular stress test 2021    Lexiscan    Hemodialysis patient [facility])     History of blood transfusion 2019    Hyperlipidemia 10/08/2020    Hyperosmolar hyperglycemic state (HHS) ([address] 2020    Hypothyroidism 10/08/2020    Iron deficiency anemia 10/01/2019    MDRO (multiple drug resistant organisms) resistance     MRSA (methicillin resistant Staphylococcus aureus)     back wound abcess    Non compliance w medication regimen 2016    Other disorders of kidney and ureter     Pregnancy 2016    16 weeks    Previous  delivery affecting pregnancy, antepartum 2017    Previous stillbirth or demise, antepartum 2016    Seizure ([address] 2020    Severe pre-eclampsia in third trimester 2016    Shock liver 02/15/2021    Valvular endocarditis 11/10/2020    This Diagnosis was added to the Problem List based on transcribed orders from Dr. Geremias Klein        Past Surgical History:        Procedure Laterality Date    BACK SURGERY      abscess    CATHETER REMOVAL N/A 10/1/2021    PERITONEAL CATHETER REMOVAL performed by Sierra Gibson MD at [facility]      x2   [facility], LAPAROSCOPIC N/A 2019    CHOLECYSTECTOMY LAPAROSCOPIC performed by Demarcus Dietrich MD at [address] [facility] N/A 2018    COLONOSCOPY WITH BIOPSY performed by Karen Barboza MD at [address] [facility] N/A 2018    COLONOSCOPY WITH BIOPSY performed by Norberto Dolan MD at [address]  1/31/2012    EF 57%    ECHO COMPL W DOP COLOR FLOW  6/10/2013         EMBOLECTOMY N/A 11/6/2020    [address], [address] [facility], YULISSA -- REQS ROOM 3 performed by Aure Malcolm MD at [address] Left 2/23/2021    LEFT LEG INCISION AND DRAINAGE, DEBRIDEMENT, WOUND VAC APPLICATION performed by Nahum Landry DPM at [address] Left 5/18/2021    LEFT LEG DEBRIDEMENT BIOPSY POSS APPLICATION WOUND VAC performed by Nahum Landry DPM at [address] N/A 6/26/2020    LAPAROSCOPIC INSERTION PERITONEAL DIALYSIS CATHETER performed by Carol Shukla MD at [facility] ESOPHAGOGASTRODUODENOSCOPY TRANSORAL DIAGNOSTIC N/A 5/30/2018    EGD ESOPHAGOGASTRODUODENOSCOPY performed by James Estrella MD at [address] [facility] TRANSESOPHAGEAL ECHOCARDIOGRAM N/A 10/19/2020    TRANSESOPHAGEAL ECHOCARDIOGRAM WITH BUBBLE STUDY performed by Cuco Elias MD at [id number] [place] [address] TUNNELED VENOUS PORT PLACEMENT  04/2018    UPPER GASTROINTESTINAL ENDOSCOPY  12/18/2018    EGD BIOPSY performed by Norberto Dolan MD at [facility] 10/11/2019    EGD ESOPHAGOGASTRODUODENOSCOPY performed by Shirley Figueroa DO at [facility] N/A 7/14/2021    EGD BIOPSY performed by Dipika Shahid MD at [address] History:    TOBACCO:   reports that she quit smoking about 14 months ago. Her smoking use included cigarettes. She has a 3.00 pack-year smoking history. She has never used smokeless tobacco.  ETOH:   reports no history of alcohol use. DRUGS:   reports previous drug use. Drug: Opiates .   Family History:       Problem Relation Age of Onset   Nick Asthma Mother     Hypertension Mother     High Blood Pressure Mother     Diabetes Mother     Asthma Brother     High Blood Pressure Father          Current MD, 100 Units at 04/14/22 1322    heparin flush 100 UNIT/ML injection 100 Units, 1 mL, IntraCATHeter, PRN, Qing Clemons MD    white petrolatum ointment, , Topical, BID PRN, Qing Clemons MD    mirtazapine (REMERON) tablet 15 mg, 15 mg, Oral, Nightly, Jacinda L Littlejohn, DO, 15 mg at 04/13/22 2125    escitalopram (LEXAPRO) tablet 10 mg, 10 mg, Oral, Daily, Jacinda L Littlejohn, DO, 10 mg at 04/14/22 1321    ARIPiprazole (ABILIFY) tablet 5 mg, 5 mg, Oral, Nightly, Jacinda L Littlejohn, DO, 5 mg at 04/13/22 2125    rOPINIRole (REQUIP) tablet 0.25 mg, 0.25 mg, Oral, Nightly, Jacinda L Littlejohn, DO, 0.25 mg at 04/13/22 2125    polyethylene glycol (GLYCOLAX) packet 17 g, 17 g, Oral, Daily PRN, Jacinda L Littlejohn, DO    glucose (GLUTOSE) 40 % oral gel 15 g, 15 g, Oral, PRN, Jacinda L Littlejohn, DO    dextrose 50 % IV solution, 12.5 g, IntraVENous, PRN, Jacinda L Littlejohn, DO, 12.5 g at 04/14/22 2191    glucagon (rDNA) injection 1 mg, 1 mg, IntraMUSCular, PRN, Jacinda L Littlejohn, DO    dextrose 5 % solution, 100 mL/hr, IntraVENous, PRN, Jacinda L Littlejohn, DO, Stopped at 04/12/22 2307    albuterol (PROVENTIL) nebulizer solution 2.5 mg, 2.5 mg, Nebulization, Q6H PRN, Qing Clemons MD    levothyroxine (SYNTHROID) tablet 88 mcg, 88 mcg, Oral, Daily, Cherry Gómez MD, 88 mcg at 04/14/22 7811    epoetin nena-epbx (RETACRIT) injection 3,000 Units, 3,000 Units, SubCUTAneous, Once per day on Mon Wed Fri, HEBER Krishnamurthy - CNP, 3,000 Units at 04/13/22 6996    Vitamin D (CHOLECALCIFEROL) tablet 1,000 Units, 1,000 Units, Per NG tube, Daily, Chevy Schreiber, APRN - CNP, 1,000 Units at 04/14/22 1322    labetalol (NORMODYNE;TRANDATE) injection 5 mg, 5 mg, IntraVENous, Q6H PRN, Laura Mohamud MD     Allergies:  Cefepime and Toradol [ketorolac tromethamine]    ROS:  General: Patient denies f/c or weight loss.   HEENT: Patient denies persistent postnasal drip, scleral icterus, drooling, persistent bleeding from nose/mouth. Resp: Patient denies SOB, wheezing, productive cough. Cards: Patient denies CP, palpitations, significant edema  GI: As above. Derm: Patient denies jaundice/rashes. Musc: Patient denies diffuse/irregular joint swelling or myalgias. PHYSICAL EXAM:  BP (!) 150/99   Pulse 96   Temp 97.6 °F (36.4 °C) (Temporal)   Resp 16   Ht 5' 4\" (1.626 m)   Wt 133 lb 9.6 oz (60.6 kg)   SpO2 98%   BMI 22.93 kg/m²   Physical Exam:  General: Chronically ill-appearing, NAD  HEENT: PERRLA, EOMI, Anicteric sclera, MMM, no rhinorrhea  Cards: RRR, no LE edema  Resp: Breathing comfortably on room air, good air movement, no use of accessory muscles, no audible wheezing  Abdomen: soft, ND. Mildly tender diffusely. Extremities: Moves all extremities, no effusions or bruising. Deconditioned, frail.   Skin: No rashes or jaundice  Neuro: A&O x 3, CN grossly intact, non-focal exam            Electronically signed by Mini Hatch MD on 4/14/2022 at 1:26 PM

## 2022-04-15 ENCOUNTER — APPOINTMENT (OUTPATIENT)
Dept: GENERAL RADIOLOGY | Age: 32
DRG: 420 | End: 2022-04-15
Payer: COMMERCIAL

## 2022-04-15 LAB
ANION GAP SERPL CALCULATED.3IONS-SCNC: 13 MMOL/L (ref 7–16)
B.E.: 1.7 MMOL/L (ref -3–3)
BUN BLDV-MCNC: 10 MG/DL (ref 6–20)
CALCIUM SERPL-MCNC: 8.7 MG/DL (ref 8.6–10.2)
CHLORIDE BLD-SCNC: 98 MMOL/L (ref 98–107)
CO2: 25 MMOL/L (ref 22–29)
COHB: 1.2 % (ref 0–1.5)
COMMENT: ABNORMAL
CREAT SERPL-MCNC: 2.2 MG/DL (ref 0.5–1)
CRITICAL: ABNORMAL
DATE ANALYZED: ABNORMAL
DATE OF COLLECTION: ABNORMAL
GFR AFRICAN AMERICAN: 32
GFR NON-AFRICAN AMERICAN: 32 ML/MIN/1.73
GLUCOSE BLD-MCNC: 389 MG/DL (ref 74–99)
HCO3: 26.9 MMOL/L (ref 22–26)
HHB: 18 % (ref 0–5)
LAB: ABNORMAL
Lab: ABNORMAL
MAGNESIUM: 1.9 MG/DL (ref 1.6–2.6)
METER GLUCOSE: 108 MG/DL (ref 74–99)
METER GLUCOSE: 108 MG/DL (ref 74–99)
METER GLUCOSE: 112 MG/DL (ref 74–99)
METER GLUCOSE: 113 MG/DL (ref 74–99)
METER GLUCOSE: 114 MG/DL (ref 74–99)
METER GLUCOSE: 115 MG/DL (ref 74–99)
METER GLUCOSE: 117 MG/DL (ref 74–99)
METER GLUCOSE: 122 MG/DL (ref 74–99)
METER GLUCOSE: 167 MG/DL (ref 74–99)
METER GLUCOSE: 226 MG/DL (ref 74–99)
METER GLUCOSE: 374 MG/DL (ref 74–99)
METER GLUCOSE: 61 MG/DL (ref 74–99)
METER GLUCOSE: <40 MG/DL (ref 74–99)
METHB: 0.3 % (ref 0–1.5)
MODE: ABNORMAL
O2 CONTENT: 12.3 ML/DL
O2 SATURATION: 81.7 % (ref 92–98.5)
O2HB: 80.5 % (ref 94–97)
OPERATOR ID: 2243
PATIENT TEMP: 37 C
PCO2: 45.1 MMHG (ref 35–45)
PH BLOOD GAS: 7.39 (ref 7.35–7.45)
PHOSPHORUS: 2.7 MG/DL (ref 2.5–4.5)
PO2: 43.2 MMHG (ref 75–100)
POTASSIUM SERPL-SCNC: 3.98 MMOL/L (ref 3.5–5)
POTASSIUM SERPL-SCNC: 4.8 MMOL/L (ref 3.5–5)
SODIUM BLD-SCNC: 136 MMOL/L (ref 132–146)
SOURCE, BLOOD GAS: ABNORMAL
THB: 10.9 G/DL (ref 11.5–16.5)
TIME ANALYZED: 1632

## 2022-04-15 PROCEDURE — 2580000003 HC RX 258: Performed by: STUDENT IN AN ORGANIZED HEALTH CARE EDUCATION/TRAINING PROGRAM

## 2022-04-15 PROCEDURE — 1200000000 HC SEMI PRIVATE

## 2022-04-15 PROCEDURE — 6360000002 HC RX W HCPCS: Performed by: INTERNAL MEDICINE

## 2022-04-15 PROCEDURE — 36415 COLL VENOUS BLD VENIPUNCTURE: CPT

## 2022-04-15 PROCEDURE — 36600 WITHDRAWAL OF ARTERIAL BLOOD: CPT

## 2022-04-15 PROCEDURE — 80048 BASIC METABOLIC PNL TOTAL CA: CPT

## 2022-04-15 PROCEDURE — 2580000003 HC RX 258: Performed by: INTERNAL MEDICINE

## 2022-04-15 PROCEDURE — 6360000002 HC RX W HCPCS: Performed by: NURSE PRACTITIONER

## 2022-04-15 PROCEDURE — 82962 GLUCOSE BLOOD TEST: CPT

## 2022-04-15 PROCEDURE — 93005 ELECTROCARDIOGRAM TRACING: CPT | Performed by: INTERNAL MEDICINE

## 2022-04-15 PROCEDURE — 6370000000 HC RX 637 (ALT 250 FOR IP): Performed by: NURSE PRACTITIONER

## 2022-04-15 PROCEDURE — 82805 BLOOD GASES W/O2 SATURATION: CPT

## 2022-04-15 PROCEDURE — 84100 ASSAY OF PHOSPHORUS: CPT

## 2022-04-15 PROCEDURE — 2500000003 HC RX 250 WO HCPCS: Performed by: INTERNAL MEDICINE

## 2022-04-15 PROCEDURE — 6370000000 HC RX 637 (ALT 250 FOR IP): Performed by: INTERNAL MEDICINE

## 2022-04-15 PROCEDURE — 83735 ASSAY OF MAGNESIUM: CPT

## 2022-04-15 PROCEDURE — S5553 INSULIN LONG ACTING 5 U: HCPCS | Performed by: INTERNAL MEDICINE

## 2022-04-15 PROCEDURE — 99231 SBSQ HOSP IP/OBS SF/LOW 25: CPT | Performed by: INTERNAL MEDICINE

## 2022-04-15 PROCEDURE — 6360000002 HC RX W HCPCS: Performed by: STUDENT IN AN ORGANIZED HEALTH CARE EDUCATION/TRAINING PROGRAM

## 2022-04-15 PROCEDURE — 99232 SBSQ HOSP IP/OBS MODERATE 35: CPT | Performed by: INTERNAL MEDICINE

## 2022-04-15 PROCEDURE — 94660 CPAP INITIATION&MGMT: CPT

## 2022-04-15 PROCEDURE — 71045 X-RAY EXAM CHEST 1 VIEW: CPT

## 2022-04-15 PROCEDURE — 84132 ASSAY OF SERUM POTASSIUM: CPT

## 2022-04-15 PROCEDURE — 6370000000 HC RX 637 (ALT 250 FOR IP): Performed by: STUDENT IN AN ORGANIZED HEALTH CARE EDUCATION/TRAINING PROGRAM

## 2022-04-15 RX ORDER — DEXTROSE MONOHYDRATE 100 MG/ML
INJECTION, SOLUTION INTRAVENOUS CONTINUOUS
Status: DISCONTINUED | OUTPATIENT
Start: 2022-04-15 | End: 2022-04-19

## 2022-04-15 RX ORDER — PANTOPRAZOLE SODIUM 40 MG/1
40 TABLET, DELAYED RELEASE ORAL
Qty: 30 TABLET | Refills: 3 | Status: SHIPPED | OUTPATIENT
Start: 2022-04-15 | End: 2022-04-25 | Stop reason: HOSPADM

## 2022-04-15 RX ORDER — INSULIN GLARGINE 100 [IU]/ML
1 INJECTION, SOLUTION SUBCUTANEOUS DAILY
Qty: 1 PEN | Refills: 0 | Status: SHIPPED | OUTPATIENT
Start: 2022-04-15 | End: 2022-04-25 | Stop reason: HOSPADM

## 2022-04-15 RX ORDER — DEXTROSE AND SODIUM CHLORIDE 5; .45 G/100ML; G/100ML
INJECTION, SOLUTION INTRAVENOUS CONTINUOUS
Status: DISCONTINUED | OUTPATIENT
Start: 2022-04-15 | End: 2022-04-15

## 2022-04-15 RX ORDER — CARVEDILOL 6.25 MG/1
6.25 TABLET ORAL 2 TIMES DAILY
Qty: 60 TABLET | Refills: 3 | Status: SHIPPED | OUTPATIENT
Start: 2022-04-15 | End: 2022-04-25 | Stop reason: HOSPADM

## 2022-04-15 RX ORDER — ERGOCALCIFEROL 1.25 MG/1
50000 CAPSULE ORAL WEEKLY
Qty: 5 CAPSULE | Refills: 0 | Status: SHIPPED | OUTPATIENT
Start: 2022-04-15

## 2022-04-15 RX ORDER — PROMETHAZINE HYDROCHLORIDE 25 MG/1
25 TABLET ORAL EVERY 6 HOURS PRN
Qty: 30 TABLET | Refills: 0 | Status: SHIPPED | OUTPATIENT
Start: 2022-04-15 | End: 2022-04-25 | Stop reason: HOSPADM

## 2022-04-15 RX ORDER — LEVOTHYROXINE SODIUM 88 UG/1
88 TABLET ORAL DAILY
Qty: 30 TABLET | Refills: 3 | Status: SHIPPED | OUTPATIENT
Start: 2022-04-16

## 2022-04-15 RX ORDER — ONDANSETRON 4 MG/1
4 TABLET, FILM COATED ORAL EVERY 8 HOURS PRN
Qty: 30 TABLET | Refills: 0 | Status: SHIPPED | OUTPATIENT
Start: 2022-04-15

## 2022-04-15 RX ADMIN — ONDANSETRON 4 MG: 2 INJECTION INTRAMUSCULAR; INTRAVENOUS at 14:14

## 2022-04-15 RX ADMIN — INSULIN LISPRO 2 UNITS: 100 INJECTION, SOLUTION INTRAVENOUS; SUBCUTANEOUS at 07:59

## 2022-04-15 RX ADMIN — SODIUM CHLORIDE, PRESERVATIVE FREE 100 UNITS: 5 INJECTION INTRAVENOUS at 07:58

## 2022-04-15 RX ADMIN — INSULIN GLARGINE-YFGN 1 UNITS: 100 INJECTION, SOLUTION SUBCUTANEOUS at 09:38

## 2022-04-15 RX ADMIN — ESCITALOPRAM 10 MG: 10 TABLET, FILM COATED ORAL at 07:58

## 2022-04-15 RX ADMIN — Medication 12.5 G: at 20:29

## 2022-04-15 RX ADMIN — DIPHENHYDRAMINE HYDROCHLORIDE 25 MG: 50 INJECTION, SOLUTION INTRAMUSCULAR; INTRAVENOUS at 21:09

## 2022-04-15 RX ADMIN — CARVEDILOL 6.25 MG: 6.25 TABLET, FILM COATED ORAL at 07:57

## 2022-04-15 RX ADMIN — METOCLOPRAMIDE 5 MG: 5 TABLET ORAL at 16:49

## 2022-04-15 RX ADMIN — ACETAMINOPHEN 650 MG: 325 TABLET ORAL at 06:11

## 2022-04-15 RX ADMIN — PROMETHAZINE HYDROCHLORIDE 25 MG: 25 TABLET ORAL at 13:02

## 2022-04-15 RX ADMIN — METOCLOPRAMIDE 5 MG: 5 TABLET ORAL at 07:58

## 2022-04-15 RX ADMIN — MIRTAZAPINE 15 MG: 15 TABLET, FILM COATED ORAL at 20:31

## 2022-04-15 RX ADMIN — PANTOPRAZOLE SODIUM 40 MG: 40 TABLET, DELAYED RELEASE ORAL at 06:11

## 2022-04-15 RX ADMIN — ARIPIPRAZOLE 5 MG: 5 TABLET ORAL at 20:31

## 2022-04-15 RX ADMIN — DEXTROSE MONOHYDRATE: 100 INJECTION, SOLUTION INTRAVENOUS at 22:37

## 2022-04-15 RX ADMIN — ACETAMINOPHEN 650 MG: 325 TABLET ORAL at 13:02

## 2022-04-15 RX ADMIN — Medication 10 ML: at 20:32

## 2022-04-15 RX ADMIN — METOCLOPRAMIDE 5 MG: 5 TABLET ORAL at 14:05

## 2022-04-15 RX ADMIN — INSULIN LISPRO 1 UNITS: 100 INJECTION, SOLUTION INTRAVENOUS; SUBCUTANEOUS at 13:04

## 2022-04-15 RX ADMIN — PANTOPRAZOLE SODIUM 40 MG: 40 TABLET, DELAYED RELEASE ORAL at 16:48

## 2022-04-15 RX ADMIN — PROMETHAZINE HYDROCHLORIDE 25 MG: 25 TABLET ORAL at 06:11

## 2022-04-15 RX ADMIN — GLUCAGON HYDROCHLORIDE 1 MG: KIT at 15:48

## 2022-04-15 RX ADMIN — Medication 10 ML: at 07:58

## 2022-04-15 RX ADMIN — PROMETHAZINE HYDROCHLORIDE 25 MG: 25 TABLET ORAL at 16:49

## 2022-04-15 RX ADMIN — BISACODYL 5 MG: 5 TABLET, COATED ORAL at 13:02

## 2022-04-15 RX ADMIN — CARVEDILOL 6.25 MG: 6.25 TABLET, FILM COATED ORAL at 20:31

## 2022-04-15 RX ADMIN — DEXTROSE AND SODIUM CHLORIDE: 5; 450 INJECTION, SOLUTION INTRAVENOUS at 17:23

## 2022-04-15 RX ADMIN — LEVOTHYROXINE SODIUM 88 MCG: 0.09 TABLET ORAL at 06:12

## 2022-04-15 RX ADMIN — ROPINIROLE 0.25 MG: 0.25 TABLET, FILM COATED ORAL at 20:31

## 2022-04-15 RX ADMIN — Medication 6 MG: at 20:31

## 2022-04-15 RX ADMIN — EPOETIN ALFA-EPBX 3000 UNITS: 3000 INJECTION, SOLUTION INTRAVENOUS; SUBCUTANEOUS at 07:58

## 2022-04-15 RX ADMIN — ACETAMINOPHEN 650 MG: 650 SUPPOSITORY RECTAL at 16:20

## 2022-04-15 RX ADMIN — METOCLOPRAMIDE 5 MG: 5 TABLET ORAL at 20:31

## 2022-04-15 RX ADMIN — Medication 1000 UNITS: at 07:58

## 2022-04-15 ASSESSMENT — PAIN SCALES - GENERAL
PAINLEVEL_OUTOF10: 4
PAINLEVEL_OUTOF10: 0
PAINLEVEL_OUTOF10: 0
PAINLEVEL_OUTOF10: 3
PAINLEVEL_OUTOF10: 0
PAINLEVEL_OUTOF10: 3

## 2022-04-15 NOTE — PROGRESS NOTES
ENDOCRINOLOGY PROGRESS NOTE  Via Tele-Health Service       Date of admission: 3/27/2022  Date of service: 4/14/2022  Admitting physician: Jared Walter DO   Primary Care Physician: Keyana Newton MD  Consultant physician: Pascual Mcginnis MD     Reason for the consultation:  DM type 1, labile diabetes     History of Present Illness: The history is provided from medical records, pt sedated intubated     Jamal Bower is a 36 y.o. old female nursing home resident with PMH of Type I DM, ESRD on PD,HTN,hypothyroidism, infective endocarditis and other listed below admitted to [facility] on 3/27/2022 because of AMS. Endocrine service was consulted for DM management.      subjective   Seen thru tele-camera, c/o abdominal pain, appetite still poor     Point of care glucose monitoring (Independently reviewed)   Recent Labs     04/13/22  2047 04/14/22  0632 04/14/22  0653 04/14/22  1319 04/14/22  1407 04/14/22  1440 04/14/22  1520 04/14/22  1654   GLUMET 104* 47* 115* 66* 56* 76 141* 187*     Scheduled Meds:   metoclopramide  5 mg Oral 4x Daily    pantoprazole  40 mg Oral BID AC    carvedilol  6.25 mg Oral BID    insulin glargine  1 Units SubCUTAneous Daily    promethazine  25 mg Oral TID AC    acetaminophen  650 mg Oral Q6H    Or    acetaminophen  650 mg Rectal Q6H    melatonin  6 mg Oral Nightly    insulin lispro  0-3 Units SubCUTAneous TID WC    sodium chloride flush  5-40 mL IntraVENous 2 times per day    heparin flush  1 mL IntraVENous 2 times per day    mirtazapine  15 mg Oral Nightly    escitalopram  10 mg Oral Daily    ARIPiprazole  5 mg Oral Nightly    rOPINIRole  0.25 mg Oral Nightly    levothyroxine  88 mcg Oral Daily    epoetin nena-epbx  3,000 Units SubCUTAneous Once per day on Mon Wed Fri    Vitamin D  1,000 Units Per NG tube Daily     PRN Meds:   glucose, 4 each, PRN  magic (miracle) mouthwash, 5 mL, 4x Daily PRN  diphenhydrAMINE, 25 mg, Q6H PRN  ondansetron, 4 mg, Q4H PRN  trimethobenzamide, 200 mg, Q6H PRN  loperamide, 2 mg, 4x Daily PRN  sodium chloride flush, 5-40 mL, PRN  sodium chloride, , PRN  heparin flush, 1 mL, PRN  white petrolatum, , BID PRN  polyethylene glycol, 17 g, Daily PRN  dextrose, 12.5 g, PRN  glucagon (rDNA), 1 mg, PRN  dextrose, 100 mL/hr, PRN  albuterol, 2.5 mg, Q6H PRN  labetalol, 5 mg, Q6H PRN      Continuous Infusions:   sodium chloride      dextrose Stopped (04/12/22 2307)       Review of Systems  Non-obtainable     OBJECTIVE    BP (!) 146/96   Pulse 100   Temp 97 °F (36.1 °C) (Temporal)   Resp 16   Ht 5' 4\" (1.626 m)   Wt 133 lb 9.6 oz (60.6 kg)   SpO2 95%   BMI 22.93 kg/m²     Intake/Output Summary (Last 24 hours) at 4/14/2022 2038  Last data filed at 4/14/2022 1245  Gross per 24 hour   Intake 60 ml   Output 3300 ml   Net -3240 ml     Physical examination:  Due to this being a TeleHealth encounter, evaluation of the following organ systems is limited: Vitals/Constitutional/EENT/Resp/CV/GI//MS/Neuro/Skin/Heme-Lymph-Imm. Modified physical exam through Telemedicine camera    General: sleepy   Neck: no obvious neck mass. No obvious neck deformity     CVS: no distress   Chest: no distress.  Chest is moving with respiration    Extremities:  no visible tremor  Skin: No visible rashes      Review of Laboratory Data:  I personally reviewed the following labs:   Recent Labs     04/12/22 0453 04/13/22 0443 04/14/22 0441   WBC 4.4* 5.2 5.1   RBC 3.85 3.49* 3.72   HGB 10.0* 8.8* 9.5*   HCT 31.9* 28.2* 29.9*   MCV 82.9 80.8 80.4   MCH 26.0 25.2* 25.5*   MCHC 31.3* 31.2* 31.8*   RDW 18.7* 19.6* 19.2*    184 194   MPV 10.3 10.2 10.2     Recent Labs     04/12/22 0453 04/12/22  0453 04/13/22  0443 04/13/22  0831 04/14/22  0441      < > 132 134 137   K 4.3   < > 4.2 4.4 4.0      < > 97* 97* 100   CO2 23   < > 25 24 29   BUN 14   < > 8 9 14   CREATININE 4.2*   < > 2.4* 2.6* 3.4*   GLUCOSE 29*   < > 268* 328* 45*   CALCIUM 9.3   < > 8.4* 9.0 8.8   PROT 7.2  -- 6.4  --  6.5   LABALBU 3.5  --  3.3*  --  3.4*   BILITOT 0.4  --  0.3  --  0.3   ALKPHOS 159*  --  143*  --  144*   AST 10  --  8  --  8   ALT 7  --  7  --  7    < > = values in this interval not displayed. Beta-Hydroxybutyrate   Date Value Ref Range Status   04/13/2022 2.56 (H) 0.02 - 0.27 mmol/L Final   04/06/2022 >4.50 (H) 0.02 - 0.27 mmol/L Final   03/29/2022 >4.50 (H) 0.02 - 0.27 mmol/L Final     Lab Results   Component Value Date    LABA1C 7.7 03/02/2022    LABA1C 8.7 12/08/2021    LABA1C 10.2 11/23/2021     Lab Results   Component Value Date/Time    TSH 5.370 (H) 03/28/2022 01:56 AM    T4FREE 1.10 03/28/2022 01:56 AM    C3HLWFZ 8.6 11/05/2015 02:20 AM    FT3 1.3 (L) 10/31/2020 10:43 AM    J4FSAZX 36.73 (L) 07/20/2020 02:00 PM     Lab Results   Component Value Date    LABA1C 7.7 03/02/2022    GLUCOSE 45 04/14/2022    GLUCOSE 130 05/18/2012    MALBCR 2949.3 01/15/2020    LABMICR 1740.1 01/15/2020    LABCREA 59 01/15/2020    LABCREA 61 01/15/2020     Lab Results   Component Value Date    TRIG 82 01/14/2020    HDL 33 01/14/2020    LDLCALC 46 01/14/2020    CHOL 95 01/14/2020       Blood culture   Lab Results   Component Value Date    BC 5 Days no growth 03/28/2022    BC 5 Days no growth 03/27/2022       Radiology:  XR ABDOMEN (KUB) (SINGLE AP VIEW)   Final Result   No evidence of bowel obstruction. XR CHEST PORTABLE   Final Result   1. The lungs are clear. There is no infiltrate or effusion. 2. Stable position of the support lines and tubes. XR CHEST PORTABLE   Final Result   1. There is no acute cardiopulmonary disease   2. Stable position of the support lines and tubes. XR CHEST PORTABLE   Final Result   Infiltrate and or atelectasis at the left lower lobe. Substantially resolved   infiltrate and or atelectasis on the right. New NG tube. CT HEAD WO CONTRAST   Final Result   No acute intracranial abnormality or hemorrhage.          CT ABDOMEN PELVIS WO CONTRAST Additional Contrast? None   Final Result   1. Moderate ascites throughout abdomen and pelvis. 2.  Multiple thick walled loops of small bowel throughout the abdomen could   suggest nonspecific infectious or inflammatory enteritis. 3.  Generalized body wall edema. 4.  Small bilateral pleural effusions with adjacent atelectatic changes. XR ABDOMEN FOR NG/OG/NE TUBE PLACEMENT   Final Result   Enteric tube tip in the body of the stomach. XR CHEST PORTABLE   Final Result   Right perihilar opacity. Medical Records/Labs/Images review:   I personally reviewed and summarized previous records   All labs and imaging were reviewed independently     Mary Maldonado, a 36 y.o.-old female seen today for inpatient diabetes management     Diabetes Mellitus type I    · Poor nutrition and ESRD making her diabetes very brittle   · we recommend the following sq insulin regimen   · lantus 1u daily   · Modified Low dose sliding scale (1u:100>200) with meals  · Continue following BG and adjust insulin regimen  · On discharge we recommend dc pt on Lantus 1u daily (give only if BG >180) + modified low dose sliding scale 1u:100>200, no bedtime sliding scale   · Pt will follow with very soon after discharge. Endocrine follow up visit, Tuesday 4/19 at  2:30pm    Gastroparesis    · Her current main issue and significantly affecting her nutrition   · GI service on board   · Management per primary team     primary Hypothyroidism  · Levothyroxine was adjusted during this admission to 88 mcg daily      ESRD  · Pt at risk for hypoglycemia  · On dialysis, nephrology following    Thank you for allowing us to participate in the care of this patient. Please do not hesitate to contact us with any additional questions.      Sindi Reynoso MD  Endocrinologist, [facility] Diabetes Care and Endocrinology   [address] [facility] [address]   Phone: [phone]  Fax: [phone]  ----------------------------  An electronic signature was used to authenticate this note.  Kevin Rojas MD on 4/14/2022 at 8:38 PM

## 2022-04-15 NOTE — PROGRESS NOTES
This nurse attempted x2 to draw pts labs without success. Also attempt x1 via Mid-line that was unsuccessful. Myself as well as another floor RN called lab stat line to have labs drawn for a total of 3 calls placed.

## 2022-04-15 NOTE — CARE COORDINATION
4/15 Update CM note. Per GI yesterday, gastroparesis diet to be started, Protonix PO BID and scheduled Reglan PO Q6H. Physician discussed pyloric balloon dilation and Botox injections which patient was scheduled for in March but was unable to complete d/t hospitalization. Also discussed possibility of PEG-J tube. Documentation states patient will consider these options but not at this time. Endocrine arranged office visit for 4/19 at 2:30 PM. Patient received HD yesterday. Referral was made yesterday to [facility] for aide services. Plan remains for patient to discharge home when medically stable and patient's mother [facility] will provide transportation.     Katie Stuart, AVILAN, RN  [facility] Case Management   Cell: [phone]

## 2022-04-15 NOTE — PROGRESS NOTES
[facility]  Internal Medicine Residency Program  Progress Note - House Team     Patient:  Radha Rowland 36 y.o. female MRN: [id number]     Date of Service: 4/15/2022     CC: hypoglycemia  Overnight events: No acute events overnight     Subjective     Patient was seen and examined this morning at bedside in no acute distress. No acute events overnight. Patient's sugars were in high 300s this AM, given 2U humalog. Then given 1 U of lantus. Given another unit of humalog because sugars were 226. Patient endorses nausea and abdominal pain. Spoke with mother about PEG-J tube as well as corpak. Mother does not want to proceed with corpak, but would like to get PEG-J tube on this admission. In the afternoon, patient was hypoglycemic <40 and unresponsive. Patient was given glucagon and D50 with improvement of sugars to 167. Became apneic and hypoxic with this episode, started on 4L O2. Became more responsive and started following commands with improvement of sugars and O2 sat. Currently saturating 95%, sugars are 108, will start D5 1/2 NS. Objective     Physical Exam:  · Vitals: BP (!) 152/107   Pulse 103   Temp 99 °F (37.2 °C) (Oral)   Resp 16   Ht 5' 4\" (1.626 m)   Wt 133 lb 9.6 oz (60.6 kg)   SpO2 100%   BMI 22.93 kg/m²     · I & O - 24hr: No intake/output data recorded. · General Appearance: alert, appears stated age and cooperative  · HEENT:  Head: Normocephalic, no lesions, without obvious abnormality. · Neck: no adenopathy, no carotid bruit, no JVD, supple, symmetrical, trachea midline and thyroid not enlarged, symmetric, no tenderness/mass/nodules  · Lung: clear to auscultation bilaterally  · Heart: regular rate and rhythm, S1, S2 normal, no murmur, click, rub or gallop  · Abdomen: soft, non-tender; bowel sounds normal; no masses,  no organomegaly  · Extremities:  extremities normal, atraumatic, no cyanosis or edema  · Musculokeletal: No joint swelling, no muscle tenderness. ROM normal in all joints of extremities. · Neurologic: Mental status: Alert, oriented, thought content appropriate  Subject  Pertinent Labs & Imaging Studies   jorge alberto  CBC:   Lab Results   Component Value Date    WBC 5.1 04/14/2022    RBC 3.72 04/14/2022    HGB 9.5 04/14/2022    HCT 29.9 04/14/2022    MCV 80.4 04/14/2022    MCH 25.5 04/14/2022    MCHC 31.8 04/14/2022    RDW 19.2 04/14/2022     04/14/2022    MPV 10.2 04/14/2022     BMP:    Lab Results   Component Value Date     04/15/2022    K 3.98 04/15/2022    K 4.0 04/14/2022    CL 98 04/15/2022    CO2 25 04/15/2022    BUN 10 04/15/2022    LABALBU 3.4 04/14/2022    LABALBU 4.1 05/18/2012    CREATININE 2.2 04/15/2022    CALCIUM 8.7 04/15/2022    GFRAA 32 04/15/2022    LABGLOM 32 04/15/2022    GLUCOSE 389 04/15/2022    GLUCOSE 130 05/18/2012       XR ABDOMEN (KUB) (SINGLE AP VIEW)   Final Result   No evidence of bowel obstruction. XR CHEST PORTABLE   Final Result   1. The lungs are clear. There is no infiltrate or effusion. 2. Stable position of the support lines and tubes. XR CHEST PORTABLE   Final Result   1. There is no acute cardiopulmonary disease   2. Stable position of the support lines and tubes. XR CHEST PORTABLE   Final Result   Infiltrate and or atelectasis at the left lower lobe. Substantially resolved   infiltrate and or atelectasis on the right. New NG tube. CT HEAD WO CONTRAST   Final Result   No acute intracranial abnormality or hemorrhage. CT ABDOMEN PELVIS WO CONTRAST Additional Contrast? None   Final Result   1. Moderate ascites throughout abdomen and pelvis. 2.  Multiple thick walled loops of small bowel throughout the abdomen could   suggest nonspecific infectious or inflammatory enteritis. 3.  Generalized body wall edema. 4.  Small bilateral pleural effusions with adjacent atelectatic changes.          XR ABDOMEN FOR NG/OG/NE TUBE PLACEMENT   Final Result   Enteric tube tip in the body of the stomach. XR CHEST PORTABLE   Final Result   Right perihilar opacity. XR CHEST PORTABLE    (Results Pending)        Resident's Assessment and Plan     Assessment:    1. Hypersensitive IDDM1-A1c 7.7% this admission  2. Nausea/Vomiting w/ Abdominal pain with Hx of gastroparesis 2/2 Hx of IDDM  3. HTN, on coreg BID  4. ESRD on HD TTS  5. Hypothyroidism-TSH 5.37, FT4 1.10 normal  6. Acute on chronic anemia-s/p 3 unit PRBCs, chronic component likely due to ESRD  7. History of MDRO UTI  8. History of C. difficile infection  9. Oral Candidiasis  10. DKA - resolved  11. Acute hypoxic respiratory failure - resolved  12. Acute metabolic encephalopathy-likely due to hypoglycemia in setting of DM1 and ESRD (CTH negative, negative UDS/SDS, NH3 normal, B12 normal 1 month ago) - resolved  13.  Aspiration pneumonia - resolved    Plan:  · Continue protonix BID, reglan, remeron 15 mg nightly, phenergan 25 mg TID and zofran to aid with abdominal pain/dyspepsia  · Per Endocrine, Lantus 1U daily with modified SSI, currently held due to hypoglycemia   - Dr. Evgeny Szymanski will adjust SSI today due to hypoglycemic episode   - Start D5 1/2 NS at 10 cc/hr and POCT q1h  · Continue home synthroid 88 mcg daily, abilify 5 mg nightly, and lexapro 10 mg daily, ropinirole 0.25 nightly  · Continue Coreg 6.25 mg BID  · Continue oral mouth wash  · Start on erythromycin  · Per Dr. Liat Thomas will plan for PEG-J tube on 4/18 or 4/19  · HD TThS per nephro, continue retacrit  · Mother updated about event and plan    PT/OT evaluation: not indicated  DVT prophylaxis/ GI prophylaxis: heparin/protonix  Disposition: continue current care    Nano Sosa MD, PGY-1  Attending physician: Dr. John Hazel

## 2022-04-15 NOTE — PLAN OF CARE
Problem: Skin Integrity:  Goal: Will show no infection signs and symptoms  Description: Will show no infection signs and symptoms  Outcome: Met This Shift  Goal: Absence of new skin breakdown  Description: Absence of new skin breakdown  Outcome: Met This Shift     Problem: Serum Glucose Level - Abnormal:  Goal: Ability to maintain appropriate glucose levels has stabilized  Description: Ability to maintain appropriate glucose levels has stabilized  Outcome: Met This Shift     Problem: Breathing Pattern - Ineffective:  Goal: Ability to achieve and maintain a regular respiratory rate will improve  Description: Ability to achieve and maintain a regular respiratory rate will improve  Outcome: Met This Shift

## 2022-04-15 NOTE — PROGRESS NOTES
Department of Internal Medicine  Nephrology Progress Note      Events reviewed. SUBJECTIVE:  We are following Miss Seymour Severance for ESKD on HD. Reports no complaints.     PHYSICAL EXAM:      Vitals:    VITALS:  BP (!) 148/94   Pulse 100   Temp 98.1 °F (36.7 °C) (Oral)   Resp 16   Ht 5' 4\" (1.626 m)   Wt 133 lb 9.6 oz (60.6 kg)   SpO2 97%   BMI 22.93 kg/m²   24HR INTAKE/OUTPUT:      Intake/Output Summary (Last 24 hours) at 4/15/2022 1302  Last data filed at 4/14/2022 2225  Gross per 24 hour   Intake 150 ml   Output --   Net 150 ml       Access: RIJ tunneled dialysis catheter  Constitutional: Awake, alert, NAD  HEENT:  PERRL, normocephalic, atraumatic  Respiratory:  CTA bilateral  Cardiovascular/Edema:  ST, RRR, no murmur gallop or rub  Gastrointestinal:  abd distended, c/o persistent abdominal pain  Neurologic: A&OX3 follows commands, no focal deficit  Skin:  Warm, dry, ecchymotic areas to abdomen    Other:    Scheduled Meds:   bisacodyl  5 mg Oral Daily    metoclopramide  5 mg Oral 4x Daily    pantoprazole  40 mg Oral BID AC    carvedilol  6.25 mg Oral BID    insulin glargine  1 Units SubCUTAneous Daily    promethazine  25 mg Oral TID AC    acetaminophen  650 mg Oral Q6H    Or    acetaminophen  650 mg Rectal Q6H    melatonin  6 mg Oral Nightly    insulin lispro  0-3 Units SubCUTAneous TID WC    sodium chloride flush  5-40 mL IntraVENous 2 times per day    heparin flush  1 mL IntraVENous 2 times per day    mirtazapine  15 mg Oral Nightly    escitalopram  10 mg Oral Daily    ARIPiprazole  5 mg Oral Nightly    rOPINIRole  0.25 mg Oral Nightly    levothyroxine  88 mcg Oral Daily    epoetin nena-epbx  3,000 Units SubCUTAneous Once per day on Mon Wed Fri    Vitamin D  1,000 Units Per NG tube Daily     Continuous Infusions:   sodium chloride      dextrose Stopped (04/12/22 2307)     PRN Meds:.glucose, diphenhydrAMINE, magic (miracle) mouthwash, ondansetron, trimethobenzamide, loperamide, sodium chloride flush, sodium chloride, heparin flush, white petrolatum, polyethylene glycol, dextrose, glucagon (rDNA), dextrose, albuterol, labetalol    DATA:    CBC:   Lab Results   Component Value Date    WBC 5.1 04/14/2022    RBC 3.72 04/14/2022    HGB 9.5 04/14/2022    HCT 29.9 04/14/2022    MCV 80.4 04/14/2022    MCH 25.5 04/14/2022    MCHC 31.8 04/14/2022    RDW 19.2 04/14/2022     04/14/2022    MPV 10.2 04/14/2022     CMP:    Lab Results   Component Value Date     04/15/2022    K 4.8 04/15/2022    K 4.0 04/14/2022    CL 98 04/15/2022    CO2 25 04/15/2022    BUN 10 04/15/2022    CREATININE 2.2 04/15/2022    GFRAA 32 04/15/2022    LABGLOM 32 04/15/2022    GLUCOSE 389 04/15/2022    GLUCOSE 130 05/18/2012    PROT 6.5 04/14/2022    LABALBU 3.4 04/14/2022    LABALBU 4.1 05/18/2012    CALCIUM 8.7 04/15/2022    BILITOT 0.3 04/14/2022    ALKPHOS 144 04/14/2022    AST 8 04/14/2022    ALT 7 04/14/2022     Magnesium:    Lab Results   Component Value Date    MG 1.9 04/15/2022     Phosphorus:    Lab Results   Component Value Date    PHOS 2.7 04/15/2022     Radiology Review:      CT Head WO Contrast March 27, 2022   No acute intracranial abnormality or hemorrhage.         CT Abdomen Pelvis WO Contrast March 27, 2022   1.  Moderate ascites throughout abdomen and pelvis.       2.  Multiple thick walled loops of small bowel throughout the abdomen could   suggest nonspecific infectious or inflammatory enteritis.       3.  Generalized body wall edema.       4.  Small bilateral pleural effusions with adjacent atelectatic changes.           Chest X-Ray March 28, 2022   Infiltrate and or atelectasis at the left lower lobe.  Substantially resolved   infiltrate and or atelectasis on the right.  New NG tube.           BRIEF SUMMARY OF INITIAL CONSULT:    Briefly, Miss Crissy Mittal is a 36year-old female with a PMH of ESRD on hemodialysis 3 days/wk, on TTS schedule at [facility] I DM, poorly controlled with recurrent admissions for DKA, HTN, gastroparesis, ascites, infective endocarditis, cardiac arrest, hypothyroidism, recent Covid, and depression who was admitted on March 27, 2022 after she was found unresponsive at the SNF where she resides. Patient was found to be profoundly hypoglycemic and EMS was called. She was intubated in ER for airway protection as she remained unresponsive. CT head showed no acute intracranial abnormality or hemorrhage, chest x-ray demonstrates right perihilar opacity concerning for aspiration pneumonia. She was ultimately admitted to MICU for further evaluation. Labs on admission were significant for sodium 135, potassium 5.0, chloride 100, bicarbonate 25, BUN 38, creatinine 4.9, proBNP >70,000. We are consulted for dialysis management. Problems resolved. · Hypoglycemia, was on D10, now hyperglycemic with +ketones (beta-hydroxybutyrate >4.5)  · Acute respiratory failure, 2/2 aspiration pneumonia? On 40% Fio2. Extubated 8/60  · Acute metabolic encephalopathy 2/2 hypoglycemia. Resolving   · Acidemia, pH 7.311, PCO2 35.0, HCO3 77.2, metabolic acidosis from DKA  · Hx recurrent C. difficile infection, on flagyl po Q 8hrs   · Hx of MDRO UTI  · Aspiration pneumonia? S/p piperacillin      IMPRESSION/RECOMMENDATIONS:      1. ESKD 3 times a week TTS via RIJ tunneled dialysis catheter. 2. HTN, on carvediolol 6.25 mg bid  3. MBD of CKD, currently with hypophosphatemia. Binders d/c'd, to replace. 4. Anemia of CKD, continue epoetin alpha 3,000 unit 3 times/wk. s/p transfusion   5. Vitamin D deficiency, on ergocalciferol 1000 po daily  ------------------------------------------------------  6. Type I DM, poorly controlled with frequent readmissions for DKA, on SSI, lantus decreased to 1 unit nightly   7. Restless leg syndrome, on ropinirole at home  8. Depression, on escitalopram and Abilify  9. Hypothyroidism, on levothyroxine   10.  Gastroparesis, on metoclopramide,  For EGD and pyloric dilatation with Botox injection outpatient per GI. Will  start erythromycin with HD. 11. Nutrition: Carb control with poor appetite        Plan:     · Continue HD three times/wk, TTS while inpatient, for dialysis today.    · Erythromycin 500 mg IV with HD  · Continue epoetin alpha 3,000 units 3 times/wk  · Carvedilol 6.25 mg bid  · Continue to monitor glucose levels  · Continue to monitor phosphorus levels   · Monitor BP  · Discharge planning      Electronically signed by Julien Sifuentes MD on 4/15/2022 at 1:02 PM

## 2022-04-15 NOTE — SIGNIFICANT EVENT
Rapid Response Team Note  Date of event: 4/15/2022   Time of event: 4 pm  Andi Schwartz 36y.o. year old female   YOB: 1990   Admit date:  3/27/2022   Location: 8423/8423-B   Witnessed? : [x]Yes  [] No  Monitored? : [x]Yes  [] No  Code status: [x] Full  [] DNR-CCA  []DNR-CC  ______________________________________________________________________  Reason for RRT:    [] RR < 8     [] RR > 28   [] SpO2 <90%   [] HR < 40 bpm   [] HR > 130 bpm  [] SBP < 90 mmHg    [] SpO2 <90%   [] LOC   [] Seizures    [] Significant Bleeding Event    [] Other: altered mental status, hypoglycemia    Subjective:   CTSP regarding the above event. On arrival the patient was altered, only responsive to painful stimulus. She also had one episode of fecal incontinence. Blood sugar was undetectable. She was given glucagon and D50. Blood sugar improved to 167. Stat EKG revealing sinus tachycardia. BP was 164/80 mmHg and she was saturating   95% on 4 L oxygen through nasal cannula. She eventually became more responsive and was following commands. Stat ABG revealing PH of 7.39/PC02 of 45.1/PO2 of 43.2 HCO3 of 26. 9. of She stated that she feels nauseous. She was hemodynamically stable. Case discussed with Rosa Bill and it was decided to monitor in the telemetry floor.     Objective:    Vital signs: Temp: 98.1 /BP: 150/80 /RR: 16 /HR: 107  Initial Condition:  Conscious   [x] Yes  [] No     Breathing [x] Yes  [] No     Pulse  [x] Yes  [] No    Airway:   [x] Open/ Clear     Intervention: [] None  [] Pooled secretions     [] Suctioned  [] Stridor      [] Intubation    Lungs:   [x] Symmetrical chest rise/ CTABL Intervention: [] None  [] Use of accessory muscles    [] NIV (CPAP/BiPAP)  [] Cyanosis      [] Nasal Oxygen/Mask  [] Wheezing       [x] ABG             [] CXR  [] Other:     Circulation:   Rhythm:  [x] Sinus [] Other:     Intervention: [] None            [] IV Access  [] Peripheral              [] Central            [x] EKG            [] Cardioversion            [] Defibrillation     Capillary Refill:  [] > 2 seconds [x] < 2 seconds    Neurologic:   [] NIHSS      [] Pupillary Response:  Equally responsive to light bilaterally   Response to pain:   [x] Yes  [] No  Follow commands:  [x] Yes  [] No  Facial asymmetry:  [] Yes  [x] No  Motor strength:  [] Equal  [] Focal deficit: no gross focal neurologic deficit  Diagnostic Test:  Blood Sugar:  146 mg/dL  EKG:    Sinus tachycardia  ABG:      Lab Results   Component Value Date    PH 7.501 03/30/2022    PH 7.290 09/08/2012    PCO2 31.9 03/30/2022    PO2 126.8 03/30/2022    HCO3 24.4 03/30/2022    O2SAT 98.8 03/30/2022     Medication(s) Given:  []  Narcan (Naloxone)   []  Romazicon (Flumazenil)    []  Breathing Treatment    []  Steroids (Prednisone, Solu-Medrol)  []  Adenosine  [] Cardiac Medicines:     Infusion(s):   [] Normal Saline    Amount:       [] Lactate Ringers      [] Other:     Imaging:   [] CXR:   [] Normal         [] Pneumothorax         [] Pulmonary Edema  [] Infiltrate          [] CT Head  [] Normal          [] ICB          [] [facility]          [] Other:     Laboratory Tests:     CBC:   Lab Results   Component Value Date    WBC 5.1 04/14/2022    RBC 3.72 04/14/2022    HGB 9.5 04/14/2022    HCT 29.9 04/14/2022    MCV 80.4 04/14/2022    MCH 25.5 04/14/2022    MCHC 31.8 04/14/2022    RDW 19.2 04/14/2022     04/14/2022    MPV 10.2 04/14/2022     BMP:    Lab Results   Component Value Date     04/15/2022    K 4.8 04/15/2022    K 4.0 04/14/2022    CL 98 04/15/2022    CO2 25 04/15/2022    BUN 10 04/15/2022    LABALBU 3.4 04/14/2022    LABALBU 4.1 05/18/2012    CREATININE 2.2 04/15/2022    CALCIUM 8.7 04/15/2022    GFRAA 32 04/15/2022    LABGLOM 32 04/15/2022    GLUCOSE 389 04/15/2022    GLUCOSE 130 05/18/2012         Teams Assessment and Plan:  1.  Acute metabolic encephalopathy likely due to hypoglycemia; blood sugar undetectable; improved to 167 mg/dl after glucagon and D50  2. Type I DM  3. Gastroparesis    Plan:  · Continue to check blood sugar level every 30 minutes for 4 times and then every 4 hours  · If blood sugar persistently low, she can be started on D5NS @50 cc/hr for 4-5 hours  · Hold insulin   · Started on BIPAP  · F/u CXR  · Repeat ABG in one hour; Goal oxygen saturation >90%; low threshold for transfer to ICU  · Monitor vitals  · Aspiration/fall precautions    Disposition:  [x] No transfer   [] Transfer to monitor floor  [] Transfer to: [] MICU [] NICU [] CCU [] SICU    Patients family updated: [] Yes  [] No   Discussed with:  [] Critical Care Intensivist:       [] Primary Care Provider: Mariel Weiss MD      [] Other: Chevis Sensor?     Lupis Estes MD MD PGY-3  4/15/2022 4:19 PM  Attending Physician:Richard Reyes., DO

## 2022-04-15 NOTE — PROGRESS NOTES
Units Per NG tube Daily     PRN Meds:   glucose, 4 each, PRN  diphenhydrAMINE, 25 mg, Q6H PRN  magic (miracle) mouthwash, 5 mL, 4x Daily PRN  ondansetron, 4 mg, Q4H PRN  trimethobenzamide, 200 mg, Q6H PRN  loperamide, 2 mg, 4x Daily PRN  sodium chloride flush, 5-40 mL, PRN  sodium chloride, , PRN  heparin flush, 1 mL, PRN  white petrolatum, , BID PRN  polyethylene glycol, 17 g, Daily PRN  dextrose, 12.5 g, PRN  glucagon (rDNA), 1 mg, PRN  dextrose, 100 mL/hr, PRN  albuterol, 2.5 mg, Q6H PRN  labetalol, 5 mg, Q6H PRN      Continuous Infusions:   dextrose 5 % and 0.45 % NaCl 10 mL/hr at 04/15/22 1723    sodium chloride      dextrose Stopped (04/12/22 2307)       Review of Systems  Non-obtainable     OBJECTIVE    BP (!) 152/107   Pulse 103   Temp 99 °F (37.2 °C) (Oral)   Resp 16   Ht 5' 4\" (1.626 m)   Wt 133 lb 9.6 oz (60.6 kg)   SpO2 100%   BMI 22.93 kg/m²     Intake/Output Summary (Last 24 hours) at 4/15/2022 1944  Last data filed at 4/14/2022 2225  Gross per 24 hour   Intake 150 ml   Output --   Net 150 ml     Physical examination:  Due to this being a TeleHealth encounter, evaluation of the following organ systems is limited: Vitals/Constitutional/EENT/Resp/CV/GI//MS/Neuro/Skin/Heme-Lymph-Imm. Modified physical exam through Telemedicine camera    General: sleepy   Neck: no obvious neck mass. No obvious neck deformity     CVS: no distress   Chest: no distress.  Chest is moving with respiration    Extremities:  no visible tremor  Skin: No visible rashes      Review of Laboratory Data:  I personally reviewed the following labs:   Recent Labs     04/13/22  0443 04/14/22  0441   WBC 5.2 5.1   RBC 3.49* 3.72   HGB 8.8* 9.5*   HCT 28.2* 29.9*   MCV 80.8 80.4   MCH 25.2* 25.5*   MCHC 31.2* 31.8*   RDW 19.6* 19.2*    194   MPV 10.2 10.2     Recent Labs     04/13/22  0443 04/13/22  0443 04/13/22  0831 04/14/22  0441 04/15/22  0539 04/15/22  1632      < > 134 137 136  --    K 4.2   < > 4.4 4.0 4.8 3.98   CL 97*   < > 97* 100 98  --    CO2 25   < > 24 29 25  --    BUN 8   < > 9 14 10  --    CREATININE 2.4*   < > 2.6* 3.4* 2.2*  --    GLUCOSE 268*   < > 328* 45* 389*  --    CALCIUM 8.4*   < > 9.0 8.8 8.7  --    PROT 6.4  --   --  6.5  --   --    LABALBU 3.3*  --   --  3.4*  --   --    BILITOT 0.3  --   --  0.3  --   --    ALKPHOS 143*  --   --  144*  --   --    AST 8  --   --  8  --   --    ALT 7  --   --  7  --   --     < > = values in this interval not displayed. Beta-Hydroxybutyrate   Date Value Ref Range Status   04/13/2022 2.56 (H) 0.02 - 0.27 mmol/L Final   04/06/2022 >4.50 (H) 0.02 - 0.27 mmol/L Final   03/29/2022 >4.50 (H) 0.02 - 0.27 mmol/L Final     Lab Results   Component Value Date    LABA1C 7.7 03/02/2022    LABA1C 8.7 12/08/2021    LABA1C 10.2 11/23/2021     Lab Results   Component Value Date/Time    TSH 5.370 (H) 03/28/2022 01:56 AM    T4FREE 1.10 03/28/2022 01:56 AM    T3THCVU 8.6 11/05/2015 02:20 AM    FT3 1.3 (L) 10/31/2020 10:43 AM    B9MZWHY 36.73 (L) 07/20/2020 02:00 PM     Lab Results   Component Value Date    LABA1C 7.7 03/02/2022    GLUCOSE 389 04/15/2022    GLUCOSE 130 05/18/2012    MALBCR 2949.3 01/15/2020    LABMICR 1740.1 01/15/2020    LABCREA 59 01/15/2020    LABCREA 61 01/15/2020     Lab Results   Component Value Date    TRIG 82 01/14/2020    HDL 33 01/14/2020    LDLCALC 46 01/14/2020    CHOL 95 01/14/2020       Blood culture   Lab Results   Component Value Date    BC 5 Days no growth 03/28/2022    BC 5 Days no growth 03/27/2022       Radiology:  XR CHEST PORTABLE   Final Result   Borderline cardiac size with vascular congestion. XR ABDOMEN (KUB) (SINGLE AP VIEW)   Final Result   No evidence of bowel obstruction. XR CHEST PORTABLE   Final Result   1. The lungs are clear. There is no infiltrate or effusion. 2. Stable position of the support lines and tubes. XR CHEST PORTABLE   Final Result   1. There is no acute cardiopulmonary disease   2.  Stable position of the support lines and tubes. XR CHEST PORTABLE   Final Result   Infiltrate and or atelectasis at the left lower lobe. Substantially resolved   infiltrate and or atelectasis on the right. New NG tube. CT HEAD WO CONTRAST   Final Result   No acute intracranial abnormality or hemorrhage. CT ABDOMEN PELVIS WO CONTRAST Additional Contrast? None   Final Result   1. Moderate ascites throughout abdomen and pelvis. 2.  Multiple thick walled loops of small bowel throughout the abdomen could   suggest nonspecific infectious or inflammatory enteritis. 3.  Generalized body wall edema. 4.  Small bilateral pleural effusions with adjacent atelectatic changes. XR ABDOMEN FOR NG/OG/NE TUBE PLACEMENT   Final Result   Enteric tube tip in the body of the stomach. XR CHEST PORTABLE   Final Result   Right perihilar opacity. Medical Records/Labs/Images review:   I personally reviewed and summarized previous records   All labs and imaging were reviewed independently     [address] Maldonado, a 36 y.o.-old female seen today for inpatient diabetes management     Diabetes Mellitus type I    · Poor nutrition and ESRD making her diabetes very brittle   · Had sever hypoglycemia today, currently on D5 at 10cc/hr   · we recommend the following sq insulin regimen   · Will stop Lantus   · Change sliding scale Modified Low dose sliding scale (1u:100>250) Q6hrs   · Continue following BG and adjust insulin regimen    Failure to thrive   · Her current main issue and significantly affecting her nutrition   · Plan for PEG-J tube early next week   · Management per primary team     primary Hypothyroidism  · Levothyroxine was adjusted during this admission to 88 mcg daily      ESRD  · Pt at risk for hypoglycemia  · On dialysis, nephrology following    Thank you for allowing us to participate in the care of this patient.  Please do not hesitate to contact us with any additional questions. Sindi Reynoso MD  Endocrinologist, [facility]   [address] [facility] [address]   Phone: [phone]  Fax: [phone]  ----------------------------  An electronic signature was used to authenticate this note.  Narda Dong MD on 4/15/2022 at 7:44 PM

## 2022-04-16 LAB
ANION GAP SERPL CALCULATED.3IONS-SCNC: 13 MMOL/L (ref 7–16)
B.E.: 2.6 MMOL/L (ref -3–3)
BUN BLDV-MCNC: 17 MG/DL (ref 6–20)
CALCIUM SERPL-MCNC: 9.5 MG/DL (ref 8.6–10.2)
CHLORIDE BLD-SCNC: 97 MMOL/L (ref 98–107)
CO2: 26 MMOL/L (ref 22–29)
COHB: 0.9 % (ref 0–1.5)
CREAT SERPL-MCNC: 3.4 MG/DL (ref 0.5–1)
CRITICAL: ABNORMAL
DATE ANALYZED: ABNORMAL
DATE OF COLLECTION: ABNORMAL
GFR AFRICAN AMERICAN: 19
GFR NON-AFRICAN AMERICAN: 19 ML/MIN/1.73
GLUCOSE BLD-MCNC: 120 MG/DL (ref 74–99)
HCO3: 27.5 MMOL/L (ref 22–26)
HHB: 9.2 % (ref 0–5)
LAB: ABNORMAL
Lab: ABNORMAL
METER GLUCOSE: 102 MG/DL (ref 74–99)
METER GLUCOSE: 115 MG/DL (ref 74–99)
METER GLUCOSE: 123 MG/DL (ref 74–99)
METER GLUCOSE: 124 MG/DL (ref 74–99)
METER GLUCOSE: 125 MG/DL (ref 74–99)
METER GLUCOSE: 128 MG/DL (ref 74–99)
METER GLUCOSE: 146 MG/DL (ref 74–99)
METER GLUCOSE: 159 MG/DL (ref 74–99)
METER GLUCOSE: 186 MG/DL (ref 74–99)
METHB: 0.1 % (ref 0–1.5)
MODE: ABNORMAL
O2 CONTENT: 12.3 ML/DL
O2 SATURATION: 90.7 % (ref 92–98.5)
O2HB: 89.8 % (ref 94–97)
OPERATOR ID: 459
PATIENT TEMP: 37 C
PCO2: 43.8 MMHG (ref 35–45)
PH BLOOD GAS: 7.42 (ref 7.35–7.45)
PO2: 56.5 MMHG (ref 75–100)
POTASSIUM SERPL-SCNC: 4.4 MMOL/L (ref 3.5–5)
SODIUM BLD-SCNC: 136 MMOL/L (ref 132–146)
SOURCE, BLOOD GAS: ABNORMAL
THB: 9.7 G/DL (ref 11.5–16.5)
TIME ANALYZED: 608

## 2022-04-16 PROCEDURE — 2500000003 HC RX 250 WO HCPCS: Performed by: INTERNAL MEDICINE

## 2022-04-16 PROCEDURE — 6360000002 HC RX W HCPCS: Performed by: INTERNAL MEDICINE

## 2022-04-16 PROCEDURE — 82962 GLUCOSE BLOOD TEST: CPT

## 2022-04-16 PROCEDURE — 94660 CPAP INITIATION&MGMT: CPT

## 2022-04-16 PROCEDURE — 80048 BASIC METABOLIC PNL TOTAL CA: CPT

## 2022-04-16 PROCEDURE — 6370000000 HC RX 637 (ALT 250 FOR IP): Performed by: INTERNAL MEDICINE

## 2022-04-16 PROCEDURE — 90935 HEMODIALYSIS ONE EVALUATION: CPT

## 2022-04-16 PROCEDURE — 36415 COLL VENOUS BLD VENIPUNCTURE: CPT

## 2022-04-16 PROCEDURE — 2500000003 HC RX 250 WO HCPCS: Performed by: STUDENT IN AN ORGANIZED HEALTH CARE EDUCATION/TRAINING PROGRAM

## 2022-04-16 PROCEDURE — 6370000000 HC RX 637 (ALT 250 FOR IP): Performed by: STUDENT IN AN ORGANIZED HEALTH CARE EDUCATION/TRAINING PROGRAM

## 2022-04-16 PROCEDURE — 6360000002 HC RX W HCPCS: Performed by: STUDENT IN AN ORGANIZED HEALTH CARE EDUCATION/TRAINING PROGRAM

## 2022-04-16 PROCEDURE — 1200000000 HC SEMI PRIVATE

## 2022-04-16 PROCEDURE — 99232 SBSQ HOSP IP/OBS MODERATE 35: CPT | Performed by: INTERNAL MEDICINE

## 2022-04-16 PROCEDURE — 6370000000 HC RX 637 (ALT 250 FOR IP): Performed by: NURSE PRACTITIONER

## 2022-04-16 PROCEDURE — 2580000003 HC RX 258: Performed by: INTERNAL MEDICINE

## 2022-04-16 PROCEDURE — 82805 BLOOD GASES W/O2 SATURATION: CPT

## 2022-04-16 PROCEDURE — 99231 SBSQ HOSP IP/OBS SF/LOW 25: CPT | Performed by: INTERNAL MEDICINE

## 2022-04-16 RX ORDER — LABETALOL HYDROCHLORIDE 5 MG/ML
5 INJECTION, SOLUTION INTRAVENOUS ONCE
Status: COMPLETED | OUTPATIENT
Start: 2022-04-16 | End: 2022-04-16

## 2022-04-16 RX ORDER — CARVEDILOL 6.25 MG/1
12.5 TABLET ORAL 2 TIMES DAILY
Status: DISCONTINUED | OUTPATIENT
Start: 2022-04-16 | End: 2022-04-25 | Stop reason: HOSPADM

## 2022-04-16 RX ORDER — LABETALOL HYDROCHLORIDE 5 MG/ML
5 INJECTION, SOLUTION INTRAVENOUS EVERY 6 HOURS PRN
Status: DISCONTINUED | OUTPATIENT
Start: 2022-04-16 | End: 2022-04-25 | Stop reason: HOSPADM

## 2022-04-16 RX ADMIN — LABETALOL HYDROCHLORIDE 5 MG: 5 INJECTION, SOLUTION INTRAVENOUS at 18:26

## 2022-04-16 RX ADMIN — METOCLOPRAMIDE 5 MG: 5 TABLET ORAL at 12:21

## 2022-04-16 RX ADMIN — CARVEDILOL 12.5 MG: 6.25 TABLET, FILM COATED ORAL at 12:21

## 2022-04-16 RX ADMIN — SODIUM CHLORIDE, PRESERVATIVE FREE 10 ML: 5 INJECTION INTRAVENOUS at 18:12

## 2022-04-16 RX ADMIN — DIPHENHYDRAMINE HYDROCHLORIDE 25 MG: 50 INJECTION, SOLUTION INTRAMUSCULAR; INTRAVENOUS at 05:00

## 2022-04-16 RX ADMIN — METOCLOPRAMIDE 5 MG: 5 TABLET ORAL at 22:58

## 2022-04-16 RX ADMIN — PROMETHAZINE HYDROCHLORIDE 25 MG: 25 TABLET ORAL at 12:22

## 2022-04-16 RX ADMIN — PANTOPRAZOLE SODIUM 40 MG: 40 TABLET, DELAYED RELEASE ORAL at 15:56

## 2022-04-16 RX ADMIN — PROMETHAZINE HYDROCHLORIDE 25 MG: 25 TABLET ORAL at 06:13

## 2022-04-16 RX ADMIN — ARIPIPRAZOLE 5 MG: 5 TABLET ORAL at 22:58

## 2022-04-16 RX ADMIN — LABETALOL HYDROCHLORIDE 5 MG: 5 INJECTION, SOLUTION INTRAVENOUS at 15:55

## 2022-04-16 RX ADMIN — LEVOTHYROXINE SODIUM 88 MCG: 0.09 TABLET ORAL at 06:12

## 2022-04-16 RX ADMIN — ONDANSETRON 4 MG: 2 INJECTION INTRAMUSCULAR; INTRAVENOUS at 13:59

## 2022-04-16 RX ADMIN — ACETAMINOPHEN 650 MG: 325 TABLET ORAL at 18:12

## 2022-04-16 RX ADMIN — CARVEDILOL 12.5 MG: 6.25 TABLET, FILM COATED ORAL at 22:58

## 2022-04-16 RX ADMIN — DIPHENHYDRAMINE HYDROCHLORIDE 25 MG: 50 INJECTION, SOLUTION INTRAMUSCULAR; INTRAVENOUS at 23:17

## 2022-04-16 RX ADMIN — SODIUM CHLORIDE, PRESERVATIVE FREE 100 UNITS: 5 INJECTION INTRAVENOUS at 12:22

## 2022-04-16 RX ADMIN — MIRTAZAPINE 15 MG: 15 TABLET, FILM COATED ORAL at 22:59

## 2022-04-16 RX ADMIN — Medication 6 MG: at 22:58

## 2022-04-16 RX ADMIN — ROPINIROLE 0.25 MG: 0.25 TABLET, FILM COATED ORAL at 22:58

## 2022-04-16 RX ADMIN — Medication 10 ML: at 12:23

## 2022-04-16 RX ADMIN — ONDANSETRON 4 MG: 2 INJECTION INTRAMUSCULAR; INTRAVENOUS at 18:12

## 2022-04-16 RX ADMIN — METOCLOPRAMIDE 5 MG: 5 TABLET ORAL at 15:50

## 2022-04-16 RX ADMIN — Medication 1000 UNITS: at 12:22

## 2022-04-16 RX ADMIN — PROMETHAZINE HYDROCHLORIDE 25 MG: 25 TABLET ORAL at 15:50

## 2022-04-16 RX ADMIN — ESCITALOPRAM 10 MG: 10 TABLET, FILM COATED ORAL at 12:22

## 2022-04-16 RX ADMIN — DIPHENHYDRAMINE HYDROCHLORIDE 25 MG: 50 INJECTION, SOLUTION INTRAMUSCULAR; INTRAVENOUS at 15:55

## 2022-04-16 RX ADMIN — PANTOPRAZOLE SODIUM 40 MG: 40 TABLET, DELAYED RELEASE ORAL at 06:13

## 2022-04-16 ASSESSMENT — PAIN SCALES - GENERAL
PAINLEVEL_OUTOF10: 0
PAINLEVEL_OUTOF10: 7
PAINLEVEL_OUTOF10: 0
PAINLEVEL_OUTOF10: 0

## 2022-04-16 NOTE — DISCHARGE SUMMARY
[address]  Discharge Summary    PCP: Chance Real MD    Admit Date:3/27/2022  Discharge Date: 4/25/2022    Admission Diagnosis:   1. Acute Metabolic Encephalopathy 2/2 Hypoglycemia vs stroke vs seizure  2. Concern for aspiration pneumonitis  3. Elevated Troponin  4. Pyuria   5. Hx ESRD  6. Hx T1DM (very labile)   7. Hx HTN  8. Hx chronic C. Diff  9. Anemia Chronic Disease  10. Hx Endocarditis with Septic Emboli    Discharge Diagnosis:    1. Hypersensitive IDDM1-A1c 7.7% this admission  2. Nausea/Vomiting w/ Abdominal pain with Hx of diabetic gastroparesis s/p PEG-J 4/19  3. HTN, on coreg BID  4. ESRD on HD TTS  5. Hypothyroidism-TSH 5.37, FT4 1.10 normal  6. Acute on chronic anemia-s/p 4 unit PRBCs, chronic component likely due to ESRD  7. History of MDRO UTI  8. History of C. difficile infection  9. Oral Candidiasis  10. DKA - resolved  11. Acute hypoxic respiratory failure - resolved  12. Acute metabolic encephalopathy-likely due to hypoglycemia in setting of DM1 and ESRD (CTH negative, negative UDS/SDS, NH3 normal, B12 normal 1 month ago) - resolved  13. Aspiration pneumonia - resolved    Hospital Course: The pt is a 35 y/o female with a PMHx of brittle T1DM, ESRD on HD, HTN, recent C. Diff infection, and endocarditis w/ consequent septic emboli formation, who presented to the ED from her long-term care facility and was hospitalized on 3/28/2022. Her presenting complaint was acute encephalopathy. Per note review, the pt's LKW was 1100 on 3/28/2022. She was found lethartic and altered at approx. 1700 by NH staff, and subsequent POCT BG was too low to be read by the glucometer. EMS was called, and the pt was transported emergently to [facility]. On arrival, she was intubated for airway protection, and was transferred to the MICU. On arrival, she was tachycardic, hypertensive, afebrile, O2 saturation 100% on the ventilator.  Pro-BNP and troponin were elevated, and labs were also notable for anemia to 6.5 (baseline 6.0-8.0). UA showing pyuria and hematuria, moderate bacteria, and large LE. CXR showing possible infiltrate/edema, and CT abdomen/pelvis concerning for possible enteritis. CT head (-)ve.      Once in the MICU, the pt was started on empiric antibiotic treatment for possible aspiration pneumonia/pneumonitis, as well as suspected enteritis. Hemodialysis was restarted, and Endocrinology was consulted for aid with managing extremely labile blood sugars. Infectious cultures were negative, and the patient's antimicrobial regimen was de-escalated to Flagyl q8hrs (end date 4/4/2022). Her blood sugars were controlled w/ 1U Lantus nightly and a modified sliding scale. She was successfully extubated, and has been tolerating room air well since that time. On the floors patient has had difficulty in controlling blood glucose levels. Patient was on 1U Lantus w/ a modified sliding scale but still patient would have episodes of hypo and hyperglycemia. Patient complained of abdominal pain and nausea and would consistently not eat due to these symptoms. Patient was on Bentyl, Reglan, Zofran, Pepcid, Phenergan all with minimal relief. Patient was not eating much and on 4/4 and 4/5 patient's 1u Lantus was held despite being scheduled for nightly Lantus. BHB on 4/6 showed >4.50. Patient was treated on the floors with subq insulin and fluid. AG closed x2 and patient began feeling better. Patient still with episodes of hyperglycemia in 350s as well as hypoglycemia episodes. GI consulted with recs of starting patient on PPI BID as well as considering PEG-J tube at this time. Patient underwent PEG-J placement on 4/19 and was started on tube feeds. Patient was tolerating continuous feeds and then switched to bolus feeds. Insulin regimen was adjusted to Lantus 1U BID and a modified regular insulin regimen (0-4U, BS 80>180). Sevelamer was discontinued and Neutraphos was BID for 5 days.  Patient will need repeat BMP, Mg, Phos and Vitamin D at follow-up.       Significant findings (history and exam, laboratory, radiological, pathology, other tests):   · HEENT:  Head: Normocephalic, no lesions, without obvious abnormality. · Neck: no adenopathy, no carotid bruit, no JVD, supple, symmetrical, trachea midline and thyroid not enlarged, symmetric, no tenderness/mass/nodules  · Lung: clear to auscultation bilaterally  · Heart: regular rate and rhythm, S1, S2 normal, no murmur, click, rub or gallop  · Abdomen: soft, non-tender; bowel sounds normal; no masses,  no organomegaly. PEG-J site c/d/i  · Extremities:  extremities normal, atraumatic, no cyanosis or edema  · Musculokeletal: No joint swelling, no muscle tenderness. ROM normal in all joints of extremities. · Neurologic: Mental status: Alert, oriented, thought content appropriate    Pending test results: none    Consults:  1. Critical Care  2. Infectious Disease  3. Nephrology  4. Endocrinology  5. Gastroenterology    Procedures:  1. PEG-J 4/19    Condition at discharge: Stable    Disposition: home    Outpatient Recommendations:  1.  Repeat BMP, Mg, Phos, Vitamin D    Discharge Medications:     Medication List      START taking these medications    carvedilol 12.5 MG tablet  Commonly known as: COREG  Take 1 tablet by mouth 2 times daily     glucose 4g chewable tablet  Commonly known as: [facility] Glucose  Take 4 tablets by mouth as needed for Low blood sugar     insulin regular 100 UNIT/ML injection  Commonly known as: HUMULIN R;NOVOLIN R  Inject 0-4 Units into the skin every 6 hours     lansoprazole 3 MG/ML Susp  10 mLs by Per G Tube route 2 times daily (before meals)     Lantus SoloStar 100 UNIT/ML injection pen  Generic drug: insulin glargine  Inject 1 Units into the skin 2 times daily Please don't give if blood sugar is < 180 and tube feeds are not being given  Replaces: insulin glargine 100 UNIT/ML injection vial     lidocaine 5 % ointment  Commonly known as: XYLOCAINE  Apply topically as needed. Nepro/CarbSteady Liqd  Take 240 mLs by mouth every 6 hours Hold TF 30 min before and after synthroid. polyethylene glycol 17 g packet  Commonly known as: GLYCOLAX  Take 17 g by mouth daily as needed for Constipation     potassium & sodium phosphates 280-160-250 MG Pack  Commonly known as: PHOS-NAK  Take 1 packet by mouth in the morning and at bedtime     white petrolatum Oint ointment  Apply topically 2 times daily as needed (dry, itching skin)        CHANGE how you take these medications    levothyroxine 88 MCG tablet  Commonly known as: SYNTHROID  Take 1 tablet by mouth Daily  What changed:   · medication strength  · how much to take  · how to take this  · when to take this  · additional instructions     pregabalin 50 MG capsule  Commonly known as: LYRICA  Take 1 capsule by mouth 3 times daily for 30 days.   What changed: when to take this        CONTINUE taking these medications    albuterol sulfate  (90 Base) MCG/ACT inhaler  Inhale 4 puffs into the lungs as needed for Wheezing or Shortness of Breath     ARIPiprazole 5 MG tablet  Commonly known as: ABILIFY     cholestyramine 4 g packet  Commonly known as: QUESTRAN  Take 1 packet by mouth 2 times daily     epoetin nena-epbx 72134 UNIT/ML Soln injection  Commonly known as: RETACRIT  Inject 0.5 mLs into the skin three times a week     escitalopram 10 MG tablet  Commonly known as: LEXAPRO  Take 1 tablet by mouth daily     Glucagon 3 MG/DOSE Powd     hydrOXYzine 25 MG tablet  Commonly known as: ATARAX     ondansetron 4 MG tablet  Commonly known as: ZOFRAN  Take 1 tablet by mouth every 8 hours as needed for Nausea or Vomiting     rOPINIRole 0.25 MG tablet  Commonly known as: REQUIP     vitamin D 1.25 MG (48160 UT) Caps capsule  Commonly known as: ERGOCALCIFEROL  Take 1 capsule by mouth once a week        STOP taking these medications    dilTIAZem 90 MG tablet  Commonly known as: CARDIZEM     insulin new relationship with food. For many people, eating and savoring food is one of the most pleasing parts of daily life. You may grieve the loss of the daily habitof eating and the social aspects of sharing food with others. If you've struggled to get enough nutrition--if it's been hard to eat or swallow--having a feeding tube can help you regain your health and strength. And understanding how a feeding tube works is a first step toward dealing with changes that come with having the tube. It can also help you avoid commonproblems that can occur. What can you expect when you have a feeding tube? After surgery to insert a feeding tube, you'll have a 6- to 12-inch tube comingout of your belly. The tube is about the same width as a pen. There are different ways the tube can be used for feeding. Your doctor willhelp you decide which is best for you and how often feedings should occur. A feeding syringe. A syringe is connected to the tube. A nutritional mixture (formula) is put into the syringe and flows into the tube and your stomach. This is called bolusfeeding. A gravity bag. Formula is placed into a special bag that is hung on a hook or a pole. The height and weight of the bag make the food flow down the tube and into yourstomach. A bag and pump. A pump is used to push formula from a bag through the tube. This is also calledcontinuous feeding. How do you use a feeding tube? It's important that the food you use for tube feeding has the right blend of nutrients for you. And the food needs to be the correct thickness so the tube doesn't clog. For most people, a liquid formula that you can buy in a can works best for tube feeding. Your doctor or dietitian will help you find the rightformula to use. Each time you use the tube for feeding:   Make sure that the tube-feeding formula is at room temperature. Wash your hands before you handle the tube and formula.  Wash the top of the can of formula before you open it. Follow your doctor's instructions for how much formula to use for each feeding. If using a feeding syringe: Connect the syringe to the tube, and put the formula into the syringe. Hold the syringe up high so the formula flows into the tube. Use the plunger on the syringe to gently push any remaining formula into the tube. If using a gravity bag: Connect the bag to the tube, and add the formula to the bag. Hang the bag on a hook or pole about 18 inches above the stomach. Depending on the type of formula, the food may take a few hours to flow through the tube. Ask your doctor what you can expect and how long it should take. If using a bag and pump, follow the instructions that come with the pump. Flush the tube with warm water before and after feedings or before and after giving medicines through the tube. You can use a syringe to push water through the tube. Sit up or keep your head up during the feeding and for 30 to 60 minutes after. If you feel sick to your stomach or have stomach cramps during the feeding, slow the rate that the formula comes through the tube. Then slowly increase the rate as you can manage it. Keep the formula in the refrigerator after you open it. Follow your doctor's instructions about how long formula can sit out at room temperature. Throw away any open cans of food after 24 hours, even if they have been refrigerated. Talk with your doctor about changing your feedings or medicines if you are having problems with diarrhea, constipation, or vomiting. How do you care for a feeding tube? Keep it clean. That's the most important thing you need to know about caring for your tube. Flush the tube with warm water before and after feedings or giving medicines. You can use a syringe to push water through the tube. Clean the end (opening) of the tube every day with an antiseptic wipe. Always wash your hands before touching the tube.    Tape the tube to your body so the end is facing up. Look for medical tape in your local drugstore. It may irritate your skin less than other types of tape. Change the position of the tape every few days. Clamp the tube when you're not using it. Put the clamp close to your body so that food and liquids don't run down the tube. Keep the skin around the tube clean and dry. How do you avoid problems with a feeding tube? Blocked tube. A blocked tube can happen when the tube isn't flushed or when formula ormedicines are too thick. Prevent blockage by flushing the tube with warm water before and after using the tube. If the tube is blocked, try to clear it by flushing the tube. Call your doctor if the tube won't clear. Don't use a wire or anything else to try to unclog a tube. A wire can poke a hole in the tube. Tube falls out. Don't try to put the tube back in by yourself. Call your doctor right away. The tube needs to be replaced before the opening in your belly closes, which canhappen within hours. Leaking tube. A tube that leaks may be blocked, or it may not fit right. After checking thetube and flushing it to make sure that the tube isn't blocked, call your doctor. Where can you learn more? Go to https://CNS ResponsepeSMASHsolar.Shoutlet. org and sign in to your Brazzlebox account. Enter F416 in the [facility] box to learn more about \"Learning About Living With a Feeding Tube. \"     If you do not have an account, please click on the \"Sign Up Now\" link. Current as of: September 8, 2021 Content Version: 13.2   © 2006-2022 Healthwise, Incorporated. Care instructions adapted under license by [facility] [employer] [facility] ([facility]). If you have questions about a medical condition or this instruction, always ask your healthcare professional. [address] any warranty or liability for your use of this information.    Percutaneous Endoscopic Gastrostomy: What to Expect at [address]   PEG is a procedure to make an opening between the skin of your belly and your stomach. The doctor put a thin tube called a gastrostomy tube (also called G-tube, PEG tube, or feeding tube) into your stomach through the opening. The tube can put liquid nutrition, fluid, and medicines directly into your stomach. The tube also may be used to drain liquid or air from the stomach. Your belly may feel sore, like you pulled a muscle, for several days. Your doctor will give you pain medicine for this. It will take about a week for the skin around your feeding tube to heal. You may have some yellowish mucus where the feeding tube comes out of your belly. This is normal. It's not a sign ofinfection. You will need to learn how to use and care for your feeding tube. Your doctor may recommend that you have a nurse or dietitian visit you at home to help you get started with your feeding tube. At first you may need a friend or family member to help you with your tube feedings. But with practice, you may be ableto do it yourself. A feeding tube can break down over time. If this happens, the tube will be removed and replaced. Sometimes a tube is removed if you have an infection that is getting worse. Sometimes a tube will come out by itself. Your doctor willgive you instructions about what to do if this happens. This care sheet gives you a general idea about how long it will take for you to recover. But each person recovers at a different pace. Follow the steps belowto feel better as quickly as possible. How can you care for yourself at home? Activity    Rest when you feel tired. Getting enough sleep will help you recover. Try to walk each day. Start by walking a little more than you did the day before. Bit by bit, increase the amount you walk. Walking boosts blood flow and helps prevent pneumonia and constipation. Ask your doctor when you can drive again. Until your doctor says it is okay, avoid lifting anything that would make you strain. prescribed antibiotics, take them as directed. Do not stop taking them just because you feel better. You need to take the full course of antibiotics. Incision care    Your doctor or nurse will show you how to care for the skin around the tube. Be sure to follow the instructions on keeping the area clean. Wash the skin around your feeding tube daily with warm, soapy water, and pat it dry. Other cleaning products, such as hydrogen peroxide, can make the wound heal more slowly. You may cover the area with a gauze bandage if it weeps or rubs against clothing. Change the bandage every day. Keep the area clean and dry. Using your feeding tube    Follow your doctor's instructions about using the feeding tube. Your doctor or nurse will:   Tell you what to put through the tube. Teach you how to watch for a leaking tube, infection at the tube site, or a clog in the tube. Tell you what activities you can do. Keep your feeding tube clamped unless you are using it. Keep the tube taped to your skin at all times, and leave some slack in the tube. This helps prevent pain and keeps the tube from coming out. Wash your hands before you handle the tube and tube-feeding formula. Wash the top of the can of formula before you open it. Keep the formula in the refrigerator after you open it. Follow your doctor's instructions about how long formula can sit out at room temperature. Sit up or keep your head up during the feeding and for 30 to 60 minutes after. If you feel sick to your stomach or have stomach cramps during the tube feeding, slow the rate that the formula comes through the tube. Then gradually increase the rate as you can tolerate it. Follow-up care is a key part of your treatment and safety. Be sure to make and go to all appointments, and call your doctor if you are having problems. It's also a good idea to know your test results and keep alist of the medicines you take.    When should you call for help? Call 911 anytime you think you may need emergency care. For example, call if:    You pass out (lose consciousness). You have severe trouble breathing. You have sudden chest pain and shortness of breath, or you cough up blood. You have severe belly pain. Call your doctor now or seek immediate medical care if:    You have pain that does not get better after you take pain medicine. You have a fever over 100°F.    You have signs of infection, such as: Increased pain, swelling, warmth, or redness. Red streaks leading from the area. Pus draining from the area. A fever. The stitches that hold the feeding tube in place are loose or come out. Your feeding tube comes out. Your feeding tube is clogged, or liquid will not go down the tube. You cough a lot or have other trouble during tube feedings. You have nausea, vomiting, or diarrhea. Watch closely for any changes in your health, and be sure to contact yourdoctor if:    You have any problems with your feeding tube. Where can you learn more? Go to https://Trendslide.EduKart. org and sign in to your Mobibeam account. Enter L852 in the Antuit box to learn more about \"Percutaneous Endoscopic Gastrostomy: What to Expect at Home. \"     If you do not have an account, please click on the \"Sign Up Now\" link. Current as of: September 8, 2021 Content Version: 13.2   © 8922-3585 Healthwise, Incorporated. Care instructions adapted under license by [facility] ([facility]). If you have questions about a medical condition or this instruction, always ask your healthcare professional. [address] any warranty or liability for your use of this information. Internal medicine   Follow ups   Please follow up with the internal medicine clinic at [facility] appointment has been scheduled for 5/3 at 8:30 AM   Please keep all other follow up appointments:   Endocrinology - Dr. Adi Lucas.  The phone number for Dr. Rae Roman office is [phone]  Changes in healthcare   Please take all medications as indicated  Diet: diabetic diet, gastroparesis diet   Activity: activity as tolerated  Changes to your medications   Insulin - your insulin dosage has changed. Please take 1U Lantus twice a day, if tube feeds are not being given at the time of glucose check and blood sugar is less than 180 please do not give the Lantus. Use the modified sliding scale with regular insulin as follows:    No Insulin  180-260 1 Unit  261-340 2 Units  341-420 3 Units  Over 420 4 Units               Additional labs, testing or imaging needed after discharge. Please have these labs performed in 1 week   Vitamin D, BMP, Mg, Phos  Even if you are feeling better and not having symptoms do not stop taking antibiotic earlier then prescribed: 4/4/2022  Please contact us if you have any concerns, wish to change or make an appointment:   Internal medicine clinic   Phone: [phone]   Fax: [phone]   [address] [address]   Or please call the nurse line at [phone]. Should you have further questions in regards to this visit, you can review your clinical note and after visit summary document on your Bomberbot account. Other questions can be directed to our nurse line at [phone]. Other than any new prescriptions given to you today, the list of home medications on this After Visit Summary are based on information provided to us from you and your healthcare providers. This information, including the list, dose, and frequency of medications is only as accurate as the information you provided. If you have any questions or concerns about your home medications, please contact your Primary Care Physician for further clarification.     Elmira Alvarez MD  PGY-1   2:12 PM 4/25/2022

## 2022-04-16 NOTE — PROGRESS NOTES
Lab called about STAT labs that need to be drawn because of unsuccessful attempts during day shift. Lab will draw.     Electronically signed by Esperanza Torres RN on 4/15/2022 at 11:28 PM

## 2022-04-16 NOTE — PROGRESS NOTES
[address]   Internal Medicine Residency / [address]    Attending Physician Statement  I have discussed the case, including pertinent history and exam findings with the resident and the team.  I have seen and examined the patient and the key elements of the encounter have been performed by me. I agree with the assessment, plan and orders as documented by the resident. A&O  On dialysis  Looks wasted and BS very labile with extreme insulin sensitivity  Endo is seeing   Claims she has no retinopathy  Has Diabetic Gastroparesis and refractory to treatment   Plan: Low doses of SA insulin per endo           Considering feeding jejunostomy    Remainder of medical problems as per resident note.       Rick Marin MD [facility]  Internal Medicine Residency Faculty

## 2022-04-16 NOTE — PROGRESS NOTES
Department of Internal Medicine  Nephrology Progress Note      Events reviewed. Patient seen on dialysis. SUBJECTIVE:  We are following Miss Amando Blanco for ESKD on HD. Reports having nausea and vomiting.     PHYSICAL EXAM:      Vitals:    VITALS:  BP (!) 167/101   Pulse 98   Temp 97.9 °F (36.6 °C)   Resp 16   Ht 5' 4\" (1.626 m)   Wt 133 lb 9.6 oz (60.6 kg)   SpO2 99%   BMI 22.93 kg/m²   24HR INTAKE/OUTPUT:      Intake/Output Summary (Last 24 hours) at 4/16/2022 0818  Last data filed at 4/15/2022 2230  Gross per 24 hour   Intake 200 ml   Output --   Net 200 ml       Access: RIJ tunneled dialysis catheter  Constitutional: Awake, alert, NAD  HEENT:  PERRL, normocephalic, atraumatic  Respiratory:  CTA bilateral  Cardiovascular/Edema:  ST, RRR, no murmur gallop or rub  Gastrointestinal:  abd distended, c/o persistent abdominal pain  Neurologic: A&OX3 follows commands, no focal deficit  Skin:  Warm, dry, ecchymotic areas to abdomen    Other:    Scheduled Meds:   carvedilol  12.5 mg Oral BID    [Held by provider] bisacodyl  5 mg Oral Daily    erythromycin  500 mg IntraVENous Once per day on Mon Wed Fri    insulin lispro  0-3 Units SubCUTAneous Q6H    metoclopramide  5 mg Oral 4x Daily    pantoprazole  40 mg Oral BID AC    promethazine  25 mg Oral TID AC    acetaminophen  650 mg Oral Q6H    Or    acetaminophen  650 mg Rectal Q6H    melatonin  6 mg Oral Nightly    sodium chloride flush  5-40 mL IntraVENous 2 times per day    heparin flush  1 mL IntraVENous 2 times per day    mirtazapine  15 mg Oral Nightly    escitalopram  10 mg Oral Daily    ARIPiprazole  5 mg Oral Nightly    rOPINIRole  0.25 mg Oral Nightly    levothyroxine  88 mcg Oral Daily    epoetin nena-epbx  3,000 Units SubCUTAneous Once per day on Mon Wed Fri    Vitamin D  1,000 Units Per NG tube Daily     Continuous Infusions:   dextrose 20 mL/hr at 04/15/22 2237    sodium chloride      dextrose Stopped (04/12/22 2307)     PRN Meds:.glucose, diphenhydrAMINE, magic (miracle) mouthwash, ondansetron, trimethobenzamide, loperamide, sodium chloride flush, sodium chloride, heparin flush, white petrolatum, polyethylene glycol, dextrose, glucagon (rDNA), dextrose, albuterol, labetalol    DATA:    CBC:   Lab Results   Component Value Date    WBC 5.1 04/14/2022    RBC 3.72 04/14/2022    HGB 9.5 04/14/2022    HCT 29.9 04/14/2022    MCV 80.4 04/14/2022    MCH 25.5 04/14/2022    MCHC 31.8 04/14/2022    RDW 19.2 04/14/2022     04/14/2022    MPV 10.2 04/14/2022     CMP:    Lab Results   Component Value Date     04/16/2022    K 4.4 04/16/2022    K 4.0 04/14/2022    CL 97 04/16/2022    CO2 26 04/16/2022    BUN 17 04/16/2022    CREATININE 3.4 04/16/2022    GFRAA 19 04/16/2022    LABGLOM 19 04/16/2022    GLUCOSE 120 04/16/2022    GLUCOSE 130 05/18/2012    PROT 6.5 04/14/2022    LABALBU 3.4 04/14/2022    LABALBU 4.1 05/18/2012    CALCIUM 9.5 04/16/2022    BILITOT 0.3 04/14/2022    ALKPHOS 144 04/14/2022    AST 8 04/14/2022    ALT 7 04/14/2022     Magnesium:    Lab Results   Component Value Date    MG 1.9 04/15/2022     Phosphorus:    Lab Results   Component Value Date    PHOS 2.7 04/15/2022     Radiology Review:      CT Head WO Contrast March 27, 2022   No acute intracranial abnormality or hemorrhage.         CT Abdomen Pelvis WO Contrast March 27, 2022   1.  Moderate ascites throughout abdomen and pelvis.       2.  Multiple thick walled loops of small bowel throughout the abdomen could   suggest nonspecific infectious or inflammatory enteritis.       3.  Generalized body wall edema.       4.  Small bilateral pleural effusions with adjacent atelectatic changes.           Chest X-Ray March 28, 2022   Infiltrate and or atelectasis at the left lower lobe.  Substantially resolved   infiltrate and or atelectasis on the right.  New NG tube.           BRIEF SUMMARY OF INITIAL CONSULT:    Briefly, Miss Sweta Vuong is a 36year-old female with a PMH of ESRD on hemodialysis 3 days/wk, [place] [facility] [place], poorly controlled with recurrent admissions for DKA, HTN, gastroparesis, ascites, infective endocarditis, cardiac arrest, hypothyroidism, recent Covid, and depression who was admitted on March 27, 2022 after she was found unresponsive at the SNF where she resides. Patient was found to be profoundly hypoglycemic and EMS was called. She was intubated in ER for airway protection as she remained unresponsive. CT head showed no acute intracranial abnormality or hemorrhage, chest x-ray demonstrates right perihilar opacity concerning for aspiration pneumonia. She was ultimately admitted to MICU for further evaluation. Labs on admission were significant for sodium 135, potassium 5.0, chloride 100, bicarbonate 25, BUN 38, creatinine 4.9, proBNP >70,000. We are consulted for dialysis management. Problems resolved. · Hypoglycemia, was on D10, now hyperglycemic with +ketones (beta-hydroxybutyrate >4.5)  · Acute respiratory failure, 2/2 aspiration pneumonia? On 40% Fio2. Extubated 5/60  · Acute metabolic encephalopathy 2/2 hypoglycemia. Resolving   · Acidemia, pH 7.311, PCO2 35.0, HCO3 60.6, metabolic acidosis from DKA  · Hx recurrent C. difficile infection, on flagyl po Q 8hrs   · Hx of MDRO UTI  · Aspiration pneumonia? S/p piperacillin      IMPRESSION/RECOMMENDATIONS:      1. ESKD 3 times a week TTS via RIJ tunneled dialysis catheter. 2. HTN, on carvediolol 6.25 mg bid  3. MBD of CKD, binders on hold. 4. Anemia of CKD, continue epoetin alpha 3,000 unit 3 times/wk. s/p transfusion   5. Vitamin D deficiency, on ergocalciferol 1000 po daily  ------------------------------------------------------  6. Type I DM, poorly controlled with frequent readmissions for DKA, on SSI, lantus decreased to 1 unit nightly   7. Restless leg syndrome, on ropinirole at home  8. Depression, on escitalopram and Abilify  9.  Hypothyroidism, on levothyroxine 10. Gastroparesis, on metoclopramide,  For EGD and pyloric dilatation with Botox injection outpatient per GI. To start erythromycin with HD when available. 11. Nutrition: Carb control with poor appetite        Plan:     · Continue HD three times/wk, having dialysis today.   · Erythromycin 500 mg IV with HD when available  · Continue epoetin alpha 3,000 units 3 times/wk  · Increase carvedilol to 12.5  mg bid  · Continue to monitor glucose levels      Electronically signed by Nickolas Grimm MD on 4/16/2022 at 8:18 AM

## 2022-04-16 NOTE — FLOWSHEET NOTE
04/16/22 1039   Vital Signs   BP (!) 147/98   Temp 97.7 °F (36.5 °C)   Pulse 98   Resp 16   Post-Hemodialysis Assessment   Post-Treatment Procedures Blood returned;Catheter capped, clamped and heparinized x 2 ports   Machine Disinfection Process Exterior Machine Disinfection   Rinseback Volume (ml) 300 ml   Total Liters Processed (l/min) 64.2 l/min   Dialyzer Clearance Lightly streaked   Duration of Treatment (minutes) 200 minutes   Hemodialysis Output (ml) 3100 ml   NET Removed (ml) 2800 ml   Tolerated Treatment Good   Patient Response to Treatment pt off tx 10min early due to bp dropping to 86/53. pt rinsed back and cath closed per policy and procedure.  pt states she feels better

## 2022-04-16 NOTE — PLAN OF CARE
Problem: Skin Integrity:  Goal: Will show no infection signs and symptoms  Description: Will show no infection signs and symptoms  4/16/2022 1232 by Maria Elena Olivier RN  Outcome: Met This Shift     Problem: Skin Integrity:  Goal: Absence of new skin breakdown  Description: Absence of new skin breakdown  4/16/2022 1232 by Maria Elena Olivier RN  Outcome: Met This Shift     Problem: Serum Glucose Level - Abnormal:  Goal: Ability to maintain appropriate glucose levels has stabilized  Description: Ability to maintain appropriate glucose levels has stabilized  4/16/2022 1232 by Maria Elena Olivier RN  Outcome: Met This Shift     Problem: Breathing Pattern - Ineffective:  Goal: Ability to achieve and maintain a regular respiratory rate will improve  Description: Ability to achieve and maintain a regular respiratory rate will improve  4/16/2022 1232 by Maria Elena Olivier RN  Outcome: Met This Shift     Problem: Pain:  Description: Pain management should include both nonpharmacologic and pharmacologic interventions. Goal: Pain level will decrease  Description: Pain level will decrease  4/16/2022 1232 by Maria Elena Olivier RN  Outcome: Met This Shift     Problem: Pain:  Description: Pain management should include both nonpharmacologic and pharmacologic interventions. Goal: Control of acute pain  Description: Control of acute pain  4/16/2022 1232 by Maria Elena Olivier RN  Outcome: Met This Shift     Problem: Pain:  Description: Pain management should include both nonpharmacologic and pharmacologic interventions.   Goal: Control of chronic pain  Description: Control of chronic pain  4/16/2022 1232 by Maria Elena Olivier RN  Outcome: Met This Shift

## 2022-04-16 NOTE — PROGRESS NOTES
ENDOCRINOLOGY PROGRESS NOTE      Date of admission: 3/27/2022  Date of service: 4/16/2022  Admitting physician: Chantelle Andrews DO   Primary Care Physician: Cele Hoyos MD  Consultant physician: Dewitte Alpers MD     Reason for the consultation:  DM type 1, labile diabetes     History of Present Illness: The history is provided from medical records, pt sedated intubated     Nitza Denis is a 36 y.o. old female nursing home resident with PMH of Type I DM, ESRD on PD,HTN,hypothyroidism, infective endocarditis and other listed below admitted to [facility] on 3/27/2022 because of AMS. Endocrine service was consulted for DM management.      subjective   Pt was seen and examined, BG was good, po intake still very poor     Point of care glucose monitoring (Independently reviewed)   Recent Labs     04/15/22  2026 04/15/22  2108 04/15/22  2202 04/15/22  2308 04/16/22  0016 04/16/22  0105 04/16/22  0205 04/16/22  0612   GLUMET 61* 122* 114* 113* 125* 115* 124* 128*     Scheduled Meds:   carvedilol  12.5 mg Oral BID    [START ON 4/18/2022] erythromycin  500 mg IntraVENous Once per day on Mon Wed Fri    [Held by provider] bisacodyl  5 mg Oral Daily    insulin lispro  0-3 Units SubCUTAneous Q6H    metoclopramide  5 mg Oral 4x Daily    pantoprazole  40 mg Oral BID AC    promethazine  25 mg Oral TID AC    acetaminophen  650 mg Oral Q6H    Or    acetaminophen  650 mg Rectal Q6H    melatonin  6 mg Oral Nightly    sodium chloride flush  5-40 mL IntraVENous 2 times per day    heparin flush  1 mL IntraVENous 2 times per day    mirtazapine  15 mg Oral Nightly    escitalopram  10 mg Oral Daily    ARIPiprazole  5 mg Oral Nightly    rOPINIRole  0.25 mg Oral Nightly    levothyroxine  88 mcg Oral Daily    epoetin nena-epbx  3,000 Units SubCUTAneous Once per day on Mon Wed Fri    Vitamin D  1,000 Units Per NG tube Daily     PRN Meds:   glucose, 4 each, PRN  diphenhydrAMINE, 25 mg, Q6H PRN  magic (miracle) mouthwash, 5 mL, 4x Daily PRN  ondansetron, 4 mg, Q4H PRN  trimethobenzamide, 200 mg, Q6H PRN  loperamide, 2 mg, 4x Daily PRN  sodium chloride flush, 5-40 mL, PRN  sodium chloride, , PRN  heparin flush, 1 mL, PRN  white petrolatum, , BID PRN  polyethylene glycol, 17 g, Daily PRN  dextrose, 12.5 g, PRN  glucagon (rDNA), 1 mg, PRN  dextrose, 100 mL/hr, PRN  albuterol, 2.5 mg, Q6H PRN  labetalol, 5 mg, Q6H PRN      Continuous Infusions:   dextrose 20 mL/hr at 04/15/22 2237    sodium chloride      dextrose Stopped (04/12/22 2307)       Review of Systems  Non-obtainable     OBJECTIVE    /88   Pulse 100   Temp 97.9 °F (36.6 °C)   Resp 16   Ht 5' 4\" (1.626 m)   Wt 133 lb 9.6 oz (60.6 kg)   SpO2 99%   BMI 22.93 kg/m²     Intake/Output Summary (Last 24 hours) at 4/16/2022 1030  Last data filed at 4/15/2022 2230  Gross per 24 hour   Intake 200 ml   Output --   Net 200 ml     Physical examination:  General: awake alert, oriented x3  HEENT: normocephalic non traumatic, no exophthalmos   Neck: supple, thyroid tenderness,  Pulm: Clear equal air entry no added sounds  CVS: S1 + S2  Abd: soft lax, no tenderness  Skin: warm, no lesions, no rash.       Review of Laboratory Data:  I personally reviewed the following labs:   Recent Labs     04/14/22 0441   WBC 5.1   RBC 3.72   HGB 9.5*   HCT 29.9*   MCV 80.4   MCH 25.5*   MCHC 31.8*   RDW 19.2*      MPV 10.2     Recent Labs     04/14/22  0441 04/15/22  0539 04/15/22  1632 04/16/22  0452    136  --  136   K 4.0 4.8 3.98 4.4    98  --  97*   CO2 29 25  --  26   BUN 14 10  --  17   CREATININE 3.4* 2.2*  --  3.4*   GLUCOSE 45* 389*  --  120*   CALCIUM 8.8 8.7  --  9.5   PROT 6.5  --   --   --    LABALBU 3.4*  --   --   --    BILITOT 0.3  --   --   --    ALKPHOS 144*  --   --   --    AST 8  --   --   --    ALT 7  --   --   --      Beta-Hydroxybutyrate   Date Value Ref Range Status   04/13/2022 2.56 (H) 0.02 - 0.27 mmol/L Final   04/06/2022 >4.50 (H) 0.02 - 0.27 mmol/L Final   03/29/2022 >4.50 (H) 0.02 - 0.27 mmol/L Final     Lab Results   Component Value Date    LABA1C 7.7 03/02/2022    LABA1C 8.7 12/08/2021    LABA1C 10.2 11/23/2021     Lab Results   Component Value Date/Time    TSH 5.370 (H) 03/28/2022 01:56 AM    T4FREE 1.10 03/28/2022 01:56 AM    U4SPYZT 8.6 11/05/2015 02:20 AM    FT3 1.3 (L) 10/31/2020 10:43 AM    M1DXBKB 36.73 (L) 07/20/2020 02:00 PM     Lab Results   Component Value Date    LABA1C 7.7 03/02/2022    GLUCOSE 120 04/16/2022    GLUCOSE 130 05/18/2012    MALBCR 2949.3 01/15/2020    LABMICR 1740.1 01/15/2020    LABCREA 59 01/15/2020    LABCREA 61 01/15/2020     Lab Results   Component Value Date    TRIG 82 01/14/2020    HDL 33 01/14/2020    LDLCALC 46 01/14/2020    CHOL 95 01/14/2020       Blood culture   Lab Results   Component Value Date    BC 5 Days no growth 03/28/2022    BC 5 Days no growth 03/27/2022       Radiology:  XR CHEST PORTABLE   Final Result   Borderline cardiac size with vascular congestion. XR ABDOMEN (KUB) (SINGLE AP VIEW)   Final Result   No evidence of bowel obstruction. XR CHEST PORTABLE   Final Result   1. The lungs are clear. There is no infiltrate or effusion. 2. Stable position of the support lines and tubes. XR CHEST PORTABLE   Final Result   1. There is no acute cardiopulmonary disease   2. Stable position of the support lines and tubes. XR CHEST PORTABLE   Final Result   Infiltrate and or atelectasis at the left lower lobe. Substantially resolved   infiltrate and or atelectasis on the right. New NG tube. CT HEAD WO CONTRAST   Final Result   No acute intracranial abnormality or hemorrhage. CT ABDOMEN PELVIS WO CONTRAST Additional Contrast? None   Final Result   1. Moderate ascites throughout abdomen and pelvis. 2.  Multiple thick walled loops of small bowel throughout the abdomen could   suggest nonspecific infectious or inflammatory enteritis.       3.  Generalized body wall edema.      4.  Small bilateral pleural effusions with adjacent atelectatic changes. XR ABDOMEN FOR NG/OG/NE TUBE PLACEMENT   Final Result   Enteric tube tip in the body of the stomach. XR CHEST PORTABLE   Final Result   Right perihilar opacity. Medical Records/Labs/Images review:   I personally reviewed and summarized previous records   All labs and imaging were reviewed independently     Mary Maldonado, a 36 y.o.-old female seen today for inpatient diabetes management     Diabetes Mellitus type I    · Poor nutrition and ESRD making her diabetes very brittle   · we recommend the following sq insulin regimen   · Hold Lantus   · Change sliding scale Modified Low dose sliding scale (1u:100>250) Q6hrs   · Continue following BG and adjust insulin regimen    Failure to thrive   · Her current main issue and significantly affecting her nutrition   · Plan for PEG-J tube early next week   · Management per primary team     primary Hypothyroidism  · Levothyroxine was adjusted during this admission to 88 mcg daily      ESRD  · Pt at risk for hypoglycemia  · On dialysis, nephrology following    Thank you for allowing us to participate in the care of this patient. Please do not hesitate to contact us with any additional questions. Rio Calixto MD  Endocrinologist, [facility] Diabetes Care and Endocrinology   [address] [address]   Phone: [phone]  Fax: [phone]  ----------------------------  An electronic signature was used to authenticate this note.  Lenore Myrick MD on 4/16/2022 at 10:30 AM

## 2022-04-16 NOTE — PROGRESS NOTES
activity    Pertinent Labs & Imaging Studies   jorge alberto  CBC with Differential:    Lab Results   Component Value Date    WBC 5.1 04/14/2022    RBC 3.72 04/14/2022    HGB 9.5 04/14/2022    HCT 29.9 04/14/2022     04/14/2022    MCV 80.4 04/14/2022    MCH 25.5 04/14/2022    MCHC 31.8 04/14/2022    RDW 19.2 04/14/2022    NRBC 0.9 03/28/2022    SEGSPCT 67 06/27/2013    METASPCT 1.7 08/10/2020    LYMPHOPCT 27.5 04/14/2022    MONOPCT 6.5 04/14/2022    MYELOPCT 0.9 01/19/2022    BASOPCT 0.8 04/14/2022    MONOSABS 0.33 04/14/2022    LYMPHSABS 1.39 04/14/2022    EOSABS 0.13 04/14/2022    BASOSABS 0.04 04/14/2022     CMP:    Lab Results   Component Value Date     04/16/2022    K 4.4 04/16/2022    K 4.0 04/14/2022    CL 97 04/16/2022    CO2 26 04/16/2022    BUN 17 04/16/2022    CREATININE 3.4 04/16/2022    GFRAA 19 04/16/2022    LABGLOM 19 04/16/2022    GLUCOSE 120 04/16/2022    GLUCOSE 130 05/18/2012    PROT 6.5 04/14/2022    LABALBU 3.4 04/14/2022    LABALBU 4.1 05/18/2012    CALCIUM 9.5 04/16/2022    BILITOT 0.3 04/14/2022    ALKPHOS 144 04/14/2022    AST 8 04/14/2022    ALT 7 04/14/2022     Magnesium:    Lab Results   Component Value Date    MG 1.9 04/15/2022     Phosphorus:    Lab Results   Component Value Date    PHOS 2.7 04/15/2022     Troponin:    Lab Results   Component Value Date    TROPONINI 0.12 05/14/2021     Resident's Assessment and Plan     Assessment     1. Nausea/Vomiting w/ Abdominal pain with Hx of gastroparesis 2/2 Hx of IDDM  2. Hypoglycemia in the setting of Gastroparesis  3. HTN  4. ESRD on HD TTS  5. IDDM1-A1c 7.7% this admission  6. Hypothyroidism-TSH 5.37, FT4 1.10 normal  7. Acute on chronic anemia-s/p 3 unit PRBCs, chronic component likely due to ESRD  8. History of MDRO UTI  9. History of C. difficile infection  10. History of Depression  11. Oral Candidiasis      Plan   Phenergan, Reglan, Erythromycin to aid w/ abdominal pain/dyspepsia.  Per Endocrine, to STOP 1U Lantus nightly. Adjusted the modified SSI. Continue to follow and appreciate Endo recs  o Patient currently on D10 @20cc/hr  o modified SSI adjusted to 1U for glucose 250-350 per Endo  Kody Iqbal w/ Endocrine - Dr. Lance Warner. Plan to continue f/u as outpatient when pt discharged.  Continue Synthroid 88 mcg daily   HD TThS per nephro  o For BP management patient on Coreg 12.5mg BID  o On Retacrit MWF   Continue Abilify and Celexa   Glucose checks q4hrs   Oral candidiasis appreciated on physical exam - oral mouthwash ordered   Appreciate Endo, nephro, recommendations   GI consult in concerns for gastroparesis  o Per GI recs, patient on PPI BID  o Plans for possible PEG-J on 4/18 or 4/19; plan discussed with patient and mother (primary decision maker) and both are in agreement at this time.         PT/OT evaluation: Not indicated at this time  DVT prophylaxis/ GI prophylaxis: Heparin/Pepcid  Disposition: Continue current care    Long Rubio MD, PGY-1  Attending physician: Dr. Renee Marroquin

## 2022-04-17 LAB
ACANTHOCYTES: ABNORMAL
ANION GAP SERPL CALCULATED.3IONS-SCNC: 12 MMOL/L (ref 7–16)
ANION GAP SERPL CALCULATED.3IONS-SCNC: 18 MMOL/L (ref 7–16)
ANION GAP SERPL CALCULATED.3IONS-SCNC: 20 MMOL/L (ref 7–16)
ANISOCYTOSIS: ABNORMAL
BASOPHILS ABSOLUTE: 0.07 E9/L (ref 0–0.2)
BASOPHILS RELATIVE PERCENT: 1.2 % (ref 0–2)
BETA-HYDROXYBUTYRATE: 0.74 MMOL/L (ref 0.02–0.27)
BUN BLDV-MCNC: 11 MG/DL (ref 6–20)
BUN BLDV-MCNC: 12 MG/DL (ref 6–20)
BUN BLDV-MCNC: 13 MG/DL (ref 6–20)
BURR CELLS: ABNORMAL
CALCIUM SERPL-MCNC: 9.4 MG/DL (ref 8.6–10.2)
CALCIUM SERPL-MCNC: 9.4 MG/DL (ref 8.6–10.2)
CALCIUM SERPL-MCNC: 9.5 MG/DL (ref 8.6–10.2)
CHLORIDE BLD-SCNC: 94 MMOL/L (ref 98–107)
CHLORIDE BLD-SCNC: 97 MMOL/L (ref 98–107)
CHLORIDE BLD-SCNC: 98 MMOL/L (ref 98–107)
CO2: 18 MMOL/L (ref 22–29)
CO2: 20 MMOL/L (ref 22–29)
CO2: 22 MMOL/L (ref 22–29)
CREAT SERPL-MCNC: 2.4 MG/DL (ref 0.5–1)
CREAT SERPL-MCNC: 2.6 MG/DL (ref 0.5–1)
CREAT SERPL-MCNC: 2.7 MG/DL (ref 0.5–1)
EOSINOPHILS ABSOLUTE: 0.2 E9/L (ref 0.05–0.5)
EOSINOPHILS RELATIVE PERCENT: 3.3 % (ref 0–6)
GFR AFRICAN AMERICAN: 25
GFR AFRICAN AMERICAN: 26
GFR AFRICAN AMERICAN: 29
GFR NON-AFRICAN AMERICAN: 25 ML/MIN/1.73
GFR NON-AFRICAN AMERICAN: 26 ML/MIN/1.73
GFR NON-AFRICAN AMERICAN: 29 ML/MIN/1.73
GLUCOSE BLD-MCNC: 163 MG/DL (ref 74–99)
GLUCOSE BLD-MCNC: 263 MG/DL (ref 74–99)
GLUCOSE BLD-MCNC: 306 MG/DL (ref 74–99)
HCT VFR BLD CALC: 33 % (ref 34–48)
HEMOGLOBIN: 10.5 G/DL (ref 11.5–15.5)
HYPOCHROMIA: ABNORMAL
IMMATURE GRANULOCYTES #: 0.02 E9/L
IMMATURE GRANULOCYTES %: 0.3 % (ref 0–5)
LACTIC ACID: 1 MMOL/L (ref 0.5–2.2)
LYMPHOCYTES ABSOLUTE: 1.3 E9/L (ref 1.5–4)
LYMPHOCYTES RELATIVE PERCENT: 21.7 % (ref 20–42)
MAGNESIUM: 2 MG/DL (ref 1.6–2.6)
MCH RBC QN AUTO: 25.2 PG (ref 26–35)
MCHC RBC AUTO-ENTMCNC: 31.8 % (ref 32–34.5)
MCV RBC AUTO: 79.1 FL (ref 80–99.9)
METER GLUCOSE: 105 MG/DL (ref 74–99)
METER GLUCOSE: 112 MG/DL (ref 74–99)
METER GLUCOSE: 122 MG/DL (ref 74–99)
METER GLUCOSE: 182 MG/DL (ref 74–99)
METER GLUCOSE: 199 MG/DL (ref 74–99)
METER GLUCOSE: 217 MG/DL (ref 74–99)
METER GLUCOSE: 243 MG/DL (ref 74–99)
METER GLUCOSE: 264 MG/DL (ref 74–99)
METER GLUCOSE: 76 MG/DL (ref 74–99)
MONOCYTES ABSOLUTE: 0.35 E9/L (ref 0.1–0.95)
MONOCYTES RELATIVE PERCENT: 5.9 % (ref 2–12)
NEUTROPHILS ABSOLUTE: 4.04 E9/L (ref 1.8–7.3)
NEUTROPHILS RELATIVE PERCENT: 67.6 % (ref 43–80)
PDW BLD-RTO: 21.1 FL (ref 11.5–15)
PHOSPHORUS: 3.1 MG/DL (ref 2.5–4.5)
PLATELET # BLD: 182 E9/L (ref 130–450)
PMV BLD AUTO: ABNORMAL FL (ref 7–12)
POIKILOCYTES: ABNORMAL
POLYCHROMASIA: ABNORMAL
POTASSIUM SERPL-SCNC: 5 MMOL/L (ref 3.5–5)
POTASSIUM SERPL-SCNC: 5.3 MMOL/L (ref 3.5–5)
POTASSIUM SERPL-SCNC: 6.6 MMOL/L (ref 3.5–5)
RBC # BLD: 4.17 E12/L (ref 3.5–5.5)
REASON FOR REJECTION: NORMAL
REJECTED TEST: NORMAL
SCHISTOCYTES: ABNORMAL
SODIUM BLD-SCNC: 132 MMOL/L (ref 132–146)
SODIUM BLD-SCNC: 133 MMOL/L (ref 132–146)
SODIUM BLD-SCNC: 134 MMOL/L (ref 132–146)
TARGET CELLS: ABNORMAL
WBC # BLD: 6 E9/L (ref 4.5–11.5)

## 2022-04-17 PROCEDURE — 84100 ASSAY OF PHOSPHORUS: CPT

## 2022-04-17 PROCEDURE — 99232 SBSQ HOSP IP/OBS MODERATE 35: CPT | Performed by: INTERNAL MEDICINE

## 2022-04-17 PROCEDURE — 6370000000 HC RX 637 (ALT 250 FOR IP): Performed by: NURSE PRACTITIONER

## 2022-04-17 PROCEDURE — 6360000002 HC RX W HCPCS: Performed by: STUDENT IN AN ORGANIZED HEALTH CARE EDUCATION/TRAINING PROGRAM

## 2022-04-17 PROCEDURE — 2500000003 HC RX 250 WO HCPCS: Performed by: STUDENT IN AN ORGANIZED HEALTH CARE EDUCATION/TRAINING PROGRAM

## 2022-04-17 PROCEDURE — S5553 INSULIN LONG ACTING 5 U: HCPCS | Performed by: INTERNAL MEDICINE

## 2022-04-17 PROCEDURE — 6360000002 HC RX W HCPCS: Performed by: INTERNAL MEDICINE

## 2022-04-17 PROCEDURE — 2500000003 HC RX 250 WO HCPCS: Performed by: INTERNAL MEDICINE

## 2022-04-17 PROCEDURE — 1200000000 HC SEMI PRIVATE

## 2022-04-17 PROCEDURE — 5A1D70Z PERFORMANCE OF URINARY FILTRATION, INTERMITTENT, LESS THAN 6 HOURS PER DAY: ICD-10-PCS | Performed by: INTERNAL MEDICINE

## 2022-04-17 PROCEDURE — 83605 ASSAY OF LACTIC ACID: CPT

## 2022-04-17 PROCEDURE — 94660 CPAP INITIATION&MGMT: CPT

## 2022-04-17 PROCEDURE — 6370000000 HC RX 637 (ALT 250 FOR IP): Performed by: INTERNAL MEDICINE

## 2022-04-17 PROCEDURE — 82010 KETONE BODYS QUAN: CPT

## 2022-04-17 PROCEDURE — 36415 COLL VENOUS BLD VENIPUNCTURE: CPT

## 2022-04-17 PROCEDURE — 85025 COMPLETE CBC W/AUTO DIFF WBC: CPT

## 2022-04-17 PROCEDURE — 82962 GLUCOSE BLOOD TEST: CPT

## 2022-04-17 PROCEDURE — 90935 HEMODIALYSIS ONE EVALUATION: CPT

## 2022-04-17 PROCEDURE — 2580000003 HC RX 258: Performed by: INTERNAL MEDICINE

## 2022-04-17 PROCEDURE — 99231 SBSQ HOSP IP/OBS SF/LOW 25: CPT | Performed by: INTERNAL MEDICINE

## 2022-04-17 PROCEDURE — 80048 BASIC METABOLIC PNL TOTAL CA: CPT

## 2022-04-17 PROCEDURE — 83735 ASSAY OF MAGNESIUM: CPT

## 2022-04-17 RX ORDER — INSULIN GLARGINE-YFGN 100 [IU]/ML
1 INJECTION, SOLUTION SUBCUTANEOUS EVERY MORNING
Status: DISCONTINUED | OUTPATIENT
Start: 2022-04-17 | End: 2022-04-18

## 2022-04-17 RX ORDER — LABETALOL HYDROCHLORIDE 5 MG/ML
10 INJECTION, SOLUTION INTRAVENOUS ONCE
Status: DISCONTINUED | OUTPATIENT
Start: 2022-04-17 | End: 2022-04-25 | Stop reason: HOSPADM

## 2022-04-17 RX ADMIN — ESCITALOPRAM 10 MG: 10 TABLET, FILM COATED ORAL at 08:26

## 2022-04-17 RX ADMIN — Medication 12.5 G: at 21:10

## 2022-04-17 RX ADMIN — PROMETHAZINE HYDROCHLORIDE 25 MG: 25 TABLET ORAL at 17:26

## 2022-04-17 RX ADMIN — Medication 1000 UNITS: at 08:26

## 2022-04-17 RX ADMIN — Medication 10 ML: at 08:26

## 2022-04-17 RX ADMIN — ONDANSETRON 4 MG: 2 INJECTION INTRAMUSCULAR; INTRAVENOUS at 19:50

## 2022-04-17 RX ADMIN — CARVEDILOL 12.5 MG: 6.25 TABLET, FILM COATED ORAL at 08:26

## 2022-04-17 RX ADMIN — LOPERAMIDE HYDROCHLORIDE 2 MG: 2 CAPSULE ORAL at 01:34

## 2022-04-17 RX ADMIN — LEVOTHYROXINE SODIUM 88 MCG: 0.09 TABLET ORAL at 07:03

## 2022-04-17 RX ADMIN — PANTOPRAZOLE SODIUM 40 MG: 40 TABLET, DELAYED RELEASE ORAL at 07:05

## 2022-04-17 RX ADMIN — DIPHENHYDRAMINE HYDROCHLORIDE 25 MG: 50 INJECTION, SOLUTION INTRAMUSCULAR; INTRAVENOUS at 17:26

## 2022-04-17 RX ADMIN — PROMETHAZINE HYDROCHLORIDE 25 MG: 25 TABLET ORAL at 12:53

## 2022-04-17 RX ADMIN — PROMETHAZINE HYDROCHLORIDE 25 MG: 25 TABLET ORAL at 07:08

## 2022-04-17 RX ADMIN — PANTOPRAZOLE SODIUM 40 MG: 40 TABLET, DELAYED RELEASE ORAL at 17:26

## 2022-04-17 RX ADMIN — INSULIN GLARGINE-YFGN 1 UNITS: 100 INJECTION, SOLUTION SUBCUTANEOUS at 08:28

## 2022-04-17 RX ADMIN — TRIMETHOBENZAMIDE HYDROCHLORIDE 200 MG: 100 INJECTION INTRAMUSCULAR at 21:07

## 2022-04-17 RX ADMIN — LABETALOL HYDROCHLORIDE 5 MG: 5 INJECTION, SOLUTION INTRAVENOUS at 04:10

## 2022-04-17 RX ADMIN — CARVEDILOL 12.5 MG: 6.25 TABLET, FILM COATED ORAL at 21:16

## 2022-04-17 RX ADMIN — DIPHENHYDRAMINE HYDROCHLORIDE 25 MG: 50 INJECTION, SOLUTION INTRAMUSCULAR; INTRAVENOUS at 08:41

## 2022-04-17 RX ADMIN — INSULIN LISPRO 1 UNITS: 100 INJECTION, SOLUTION INTRAVENOUS; SUBCUTANEOUS at 08:27

## 2022-04-17 ASSESSMENT — PAIN SCALES - GENERAL
PAINLEVEL_OUTOF10: 0
PAINLEVEL_OUTOF10: 7
PAINLEVEL_OUTOF10: 0

## 2022-04-17 ASSESSMENT — PAIN - FUNCTIONAL ASSESSMENT: PAIN_FUNCTIONAL_ASSESSMENT: PREVENTS OR INTERFERES SOME ACTIVE ACTIVITIES AND ADLS

## 2022-04-17 ASSESSMENT — PAIN DESCRIPTION - DESCRIPTORS: DESCRIPTORS: ACHING;CONSTANT;DISCOMFORT

## 2022-04-17 ASSESSMENT — PAIN DESCRIPTION - LOCATION: LOCATION: ABDOMEN;TEETH

## 2022-04-17 ASSESSMENT — PAIN DESCRIPTION - PAIN TYPE: TYPE: ACUTE PAIN

## 2022-04-17 ASSESSMENT — PAIN DESCRIPTION - ONSET: ONSET: ON-GOING

## 2022-04-17 ASSESSMENT — PAIN DESCRIPTION - FREQUENCY: FREQUENCY: CONTINUOUS

## 2022-04-17 ASSESSMENT — PAIN DESCRIPTION - PROGRESSION: CLINICAL_PROGRESSION: GRADUALLY WORSENING

## 2022-04-17 NOTE — PROGRESS NOTES
[facility]  Internal Medicine Residency Program  Progress Note - House Team     Patient:  Nyla Ryder 36 y.o. female MRN: [id number]     Date of Service: 4/17/2022     CC: AMS, hypoglycemia  Overnight events: No acute events overnight     Subjective     Patient was seen and examined this morning at bedside in no acute distress. No acute events overnight. Patient's BS have been steadily increasing overnight.  this AM, given 1U sliding scale. Overnight nurse reported multiple episodes of diarrhea. Endorses nausea, vomiting, abdominal pain. Hypertensive to 180/105, sitting pressure 113/87, will hold off on increasing antihypertensives. Denies any fevers, chills, chest pain, shortness of breath. Objective     Physical Exam:  · Vitals: BP (!) 180/105   Pulse 99   Temp 97.9 °F (36.6 °C) (Oral)   Resp 16   Ht 5' 4\" (1.626 m)   Wt 133 lb 9.6 oz (60.6 kg)   SpO2 99%   BMI 22.93 kg/m²     · I & O - 24hr: No intake/output data recorded. · General Appearance: alert, appears stated age and cooperative  · HEENT:  Head: Normocephalic, no lesions, without obvious abnormality. · Neck: no adenopathy, no carotid bruit, no JVD, supple, symmetrical, trachea midline and thyroid not enlarged, symmetric, no tenderness/mass/nodules  · Lung: clear to auscultation bilaterally  · Heart: regular rate and rhythm, S1, S2 normal, no murmur, click, rub or gallop  · Abdomen: soft, non-tender; bowel sounds normal; no masses,  no organomegaly  · Extremities:  extremities normal, atraumatic, no cyanosis or edema  · Musculokeletal: No joint swelling, no muscle tenderness. ROM normal in all joints of extremities.    · Neurologic: Mental status: Alert, oriented, thought content appropriate  Subject  Pertinent Labs & Imaging Studies   jorge alberto  CBC:   Lab Results   Component Value Date    WBC 6.0 04/17/2022    RBC 4.17 04/17/2022    HGB 10.5 04/17/2022    HCT 33.0 04/17/2022    MCV 79.1 04/17/2022    MCH 25.2 04/17/2022    MCHC 31.8 04/17/2022    RDW 21.1 04/17/2022     04/17/2022    MPV NOT CALC 04/17/2022     BMP:    Lab Results   Component Value Date     04/17/2022    K 5.3 04/17/2022    K 4.0 04/14/2022    CL 97 04/17/2022    CO2 18 04/17/2022    BUN 12 04/17/2022    LABALBU 3.4 04/14/2022    LABALBU 4.1 05/18/2012    CREATININE 2.6 04/17/2022    CALCIUM 9.4 04/17/2022    GFRAA 26 04/17/2022    LABGLOM 26 04/17/2022    GLUCOSE 306 04/17/2022    GLUCOSE 130 05/18/2012       XR CHEST PORTABLE   Final Result   Borderline cardiac size with vascular congestion. XR ABDOMEN (KUB) (SINGLE AP VIEW)   Final Result   No evidence of bowel obstruction. XR CHEST PORTABLE   Final Result   1. The lungs are clear. There is no infiltrate or effusion. 2. Stable position of the support lines and tubes. XR CHEST PORTABLE   Final Result   1. There is no acute cardiopulmonary disease   2. Stable position of the support lines and tubes. XR CHEST PORTABLE   Final Result   Infiltrate and or atelectasis at the left lower lobe. Substantially resolved   infiltrate and or atelectasis on the right. New NG tube. CT HEAD WO CONTRAST   Final Result   No acute intracranial abnormality or hemorrhage. CT ABDOMEN PELVIS WO CONTRAST Additional Contrast? None   Final Result   1. Moderate ascites throughout abdomen and pelvis. 2.  Multiple thick walled loops of small bowel throughout the abdomen could   suggest nonspecific infectious or inflammatory enteritis. 3.  Generalized body wall edema. 4.  Small bilateral pleural effusions with adjacent atelectatic changes. XR ABDOMEN FOR NG/OG/NE TUBE PLACEMENT   Final Result   Enteric tube tip in the body of the stomach. XR CHEST PORTABLE   Final Result   Right perihilar opacity. Resident's Assessment and Plan       Assessment:     1. Hypersensitive IDDM1-A1c 7.7% this admission  2.  Nausea/Vomiting w/ Abdominal pain with Hx of gastroparesis 2/2 Hx of IDDM  3. HTN, on coreg BID  4. ESRD on HD TTS  5. Hypothyroidism-TSH 5.37, FT4 1.10 normal  6. Acute on chronic anemia-s/p 3 unit PRBCs, chronic component likely due to ESRD  7. History of MDRO UTI  8. History of C. difficile infection  9. Oral Candidiasis  10. DKA - resolved  11. Acute hypoxic respiratory failure - resolved  12. Acute metabolic encephalopathy-likely due to hypoglycemia in setting of DM1 and ESRD (CTH negative, negative UDS/SDS, NH3 normal, B12 normal 1 month ago) - resolved  13. Aspiration pneumonia - resolved     Plan:  · Continue protonix BID, remeron 15 mg nightly, phenergan 25 mg TID, erythromycin to aid with abdominal pain/dyspepsia, Hold Reglan due to diarrhea  · Per Endocrine, modified SSI (1U 100>250 BS)              - Hold D10, f/u beta-hydroxybutyrate   - POCT q4h  · Continue home synthroid 88 mcg daily, abilify 5 mg nightly, and lexapro 10 mg daily, ropinirole 0.25 nightly  · Continue Coreg 12.5 mg BID   - Hypertensive to 180/105, sitting pressure 113/87, 80/64 standing will hold off on increasing antihypertensives.   · Continue oral mouth wash  · Start on erythromycin  · Per Dr. Delvis Rodríguez will plan for PEG-J tube on 4/18 or 4/19  · HD TThS per nephro, continue retacrit     PT/OT evaluation: not indicated  DVT prophylaxis/ GI prophylaxis: heparin/protonix  Disposition: continue current care    Katy Jon MD, PGY-1  Attending physician: Dr. Case Parker

## 2022-04-17 NOTE — PLAN OF CARE
Problem: Skin Integrity:  Goal: Will show no infection signs and symptoms  Description: Will show no infection signs and symptoms  Outcome: Met This Shift     Problem: Skin Integrity:  Goal: Absence of new skin breakdown  Description: Absence of new skin breakdown  Outcome: Met This Shift     Problem: Breathing Pattern - Ineffective:  Goal: Ability to achieve and maintain a regular respiratory rate will improve  Description: Ability to achieve and maintain a regular respiratory rate will improve  Outcome: Met This Shift     Problem: Pain:  Description: Pain management should include both nonpharmacologic and pharmacologic interventions. Goal: Pain level will decrease  Description: Pain level will decrease  Outcome: Met This Shift     Problem: Pain:  Description: Pain management should include both nonpharmacologic and pharmacologic interventions.   Goal: Control of acute pain  Description: Control of acute pain  Outcome: Met This Shift

## 2022-04-17 NOTE — PROGRESS NOTES
Department of Internal Medicine  Nephrology Progress Note      Events reviewed. SUBJECTIVE:  We are following Miss Amando Blanco for ESKD on HD. Reports feeling somewhat better.     PHYSICAL EXAM:      Vitals:    VITALS:  BP (!) 180/105   Pulse 99   Temp 97.9 °F (36.6 °C) (Oral)   Resp 16   Ht 5' 4\" (1.626 m)   Wt 133 lb 9.6 oz (60.6 kg)   SpO2 99%   BMI 22.93 kg/m²   24HR INTAKE/OUTPUT:      Intake/Output Summary (Last 24 hours) at 4/17/2022 1116  Last data filed at 4/16/2022 1551  Gross per 24 hour   Intake 526.71 ml   Output --   Net 526.71 ml       Access: RIJ tunneled dialysis catheter  Constitutional: Awake, alert, NAD  HEENT:  PERRL, normocephalic, atraumatic  Respiratory:  CTA bilateral  Cardiovascular/Edema:  ST, RRR, no murmur gallop or rub  Gastrointestinal:  abd distended, c/o persistent abdominal pain  Neurologic: A&OX3 follows commands, no focal deficit  Skin:  Warm, dry, ecchymotic areas to abdomen    Other:    Scheduled Meds:   insulin glargine  1 Units SubCUTAneous QAM    insulin lispro  0-3 Units SubCUTAneous 4x Daily AC & HS    carvedilol  12.5 mg Oral BID    [START ON 4/18/2022] erythromycin  500 mg IntraVENous Once per day on Mon Wed Fri    [Held by provider] bisacodyl  5 mg Oral Daily    [Held by provider] metoclopramide  5 mg Oral 4x Daily    pantoprazole  40 mg Oral BID AC    promethazine  25 mg Oral TID AC    acetaminophen  650 mg Oral Q6H    Or    acetaminophen  650 mg Rectal Q6H    melatonin  6 mg Oral Nightly    sodium chloride flush  5-40 mL IntraVENous 2 times per day    heparin flush  1 mL IntraVENous 2 times per day    mirtazapine  15 mg Oral Nightly    escitalopram  10 mg Oral Daily    ARIPiprazole  5 mg Oral Nightly    rOPINIRole  0.25 mg Oral Nightly    levothyroxine  88 mcg Oral Daily    epoetin nena-epbx  3,000 Units SubCUTAneous Once per day on Mon Wed Fri    Vitamin D  1,000 Units Per NG tube Daily     Continuous Infusions:   [Held by provider] dextrose 10 mL/hr at 04/17/22 0803    sodium chloride      dextrose Stopped (04/12/22 2307)     PRN Meds:.[Held by provider] labetalol, glucose, diphenhydrAMINE, magic (miracle) mouthwash, ondansetron, trimethobenzamide, loperamide, sodium chloride flush, sodium chloride, heparin flush, white petrolatum, polyethylene glycol, dextrose, glucagon (rDNA), dextrose, albuterol    DATA:    CBC:   Lab Results   Component Value Date    WBC 6.0 04/17/2022    RBC 4.17 04/17/2022    HGB 10.5 04/17/2022    HCT 33.0 04/17/2022    MCV 79.1 04/17/2022    MCH 25.2 04/17/2022    MCHC 31.8 04/17/2022    RDW 21.1 04/17/2022     04/17/2022    MPV NOT CALC 04/17/2022     CMP:    Lab Results   Component Value Date     04/17/2022    K 5.3 04/17/2022    K 4.0 04/14/2022    CL 97 04/17/2022    CO2 18 04/17/2022    BUN 12 04/17/2022    CREATININE 2.6 04/17/2022    GFRAA 26 04/17/2022    LABGLOM 26 04/17/2022    GLUCOSE 306 04/17/2022    GLUCOSE 130 05/18/2012    PROT 6.5 04/14/2022    LABALBU 3.4 04/14/2022    LABALBU 4.1 05/18/2012    CALCIUM 9.4 04/17/2022    BILITOT 0.3 04/14/2022    ALKPHOS 144 04/14/2022    AST 8 04/14/2022    ALT 7 04/14/2022     Magnesium:    Lab Results   Component Value Date    MG 2.0 04/17/2022     Phosphorus:    Lab Results   Component Value Date    PHOS 3.1 04/17/2022     Radiology Review:      CT Head WO Contrast March 27, 2022   No acute intracranial abnormality or hemorrhage.         CT Abdomen Pelvis WO Contrast March 27, 2022   1.  Moderate ascites throughout abdomen and pelvis.       2.  Multiple thick walled loops of small bowel throughout the abdomen could   suggest nonspecific infectious or inflammatory enteritis.       3.  Generalized body wall edema.       4.  Small bilateral pleural effusions with adjacent atelectatic changes.           Chest X-Ray March 28, 2022   Infiltrate and or atelectasis at the left lower lobe.  Substantially resolved   infiltrate and or atelectasis on the right.  New NG tube.           BRIEF SUMMARY OF INITIAL CONSULT:    Briefly, Miss Crissy Mittal is a 36year-old female with a PMH of ESRD on hemodialysis 3 days/wk, on TTS schedule at [facility] I DM, poorly controlled with recurrent admissions for DKA, HTN, gastroparesis, ascites, infective endocarditis, cardiac arrest, hypothyroidism, recent Covid, and depression who was admitted on March 27, 2022 after she was found unresponsive at the SNF where she resides. Patient was found to be profoundly hypoglycemic and EMS was called. She was intubated in ER for airway protection as she remained unresponsive. CT head showed no acute intracranial abnormality or hemorrhage, chest x-ray demonstrates right perihilar opacity concerning for aspiration pneumonia. She was ultimately admitted to MICU for further evaluation. Labs on admission were significant for sodium 135, potassium 5.0, chloride 100, bicarbonate 25, BUN 38, creatinine 4.9, proBNP >70,000. We are consulted for dialysis management. Problems resolved. · Hypoglycemia, was on D10, now hyperglycemic with +ketones (beta-hydroxybutyrate >4.5)  · Acute respiratory failure, 2/2 aspiration pneumonia? On 40% Fio2. Extubated 5/07  · Acute metabolic encephalopathy 2/2 hypoglycemia. Resolving   · Acidemia, pH 7.311, PCO2 35.0, HCO3 06.7, metabolic acidosis from DKA  · Hx recurrent C. difficile infection, on flagyl po Q 8hrs   · Hx of MDRO UTI  · Aspiration pneumonia? S/p piperacillin      IMPRESSION/RECOMMENDATIONS:      1. ESKD 3 times a week TTS via RIJ tunneled dialysis catheter. Potassium level elevated today with metabolic acidosis, will do 2-hour dialysis. 2. HTN, on carvediolol 12.5 mg bid  3. MBD of CKD, binders on hold. 4. Anemia of CKD, continue epoetin alpha 3,000 unit 3 times/wk. s/p transfusion   5. Vitamin D deficiency, on ergocalciferol 1000 po daily  ------------------------------------------------------  6.  Type I DM, poorly controlled with frequent readmissions for DKA, on SSI, lantus decreased to 1 unit nightly   7. Restless leg syndrome, on ropinirole at home  8. Depression, on escitalopram and Abilify  9. Hypothyroidism, on levothyroxine   10. Gastroparesis, on metoclopramide,  For EGD and pyloric dilatation with Botox injection outpatient per GI. To start erythromycin with HD when available.    11. Nutrition: Carb control with poor appetite        Plan:     · HD x2 hours today  · Continue HD three times/wk   · Erythromycin 500 mg IV with HD when available  · Continue epoetin alpha 3,000 units 3 times/wk  · Continue carvedilol to 12.5  mg bid  · Amlodipine 5 mg p.o. daily  · Continue to monitor glucose levels      Electronically signed by Keri Alexander MD on 4/17/2022 at 11:16 AM

## 2022-04-17 NOTE — PROGRESS NOTES
[facility]  Internal Medicine Residency / [address]    Attending Physician Statement  I have discussed the case, including pertinent history and exam findings with the resident and the team.  I have seen and examined the patient and the key elements of the encounter have been performed by me. I agree with the assessment, plan and orders as documented by the resident. A&O  BS very labile, sensitive to low doses of insulin  Gastroparesis , to have Feeding jejunal tube   Diarrhea nominal this AM, Reglan?  systolic in supine today  Check orthostatics  Follow wit endoocrinology    Remainder of medical problems as per resident note.       Bhupendra Garcia MD [facility]  Internal Medicine Residency Faculty

## 2022-04-17 NOTE — FLOWSHEET NOTE
04/17/22 1630   Vital Signs   BP (!) 174/83   Temp 97.8 °F (36.6 °C)   Pulse 89   Resp 18   SpO2 98 %   Weight 133 lb 9.6 oz (60.6 kg)   Weight Method Bed scale   Percent Weight Change 0   Pain Assessment   Pain Assessment 0-10   Pain Level 0   Post-Hemodialysis Assessment   Post-Treatment Procedures Blood returned;Catheter capped, clamped with Citrate x 2 ports   Machine Disinfection Process Acid/Vinegar Clean;Heat Disinfect; Exterior Machine Disinfection   Rinseback Volume (ml) 300 ml   Dialyzer Clearance Moderately streaked   NET Removed (ml) 0 ml   Bilateral Breath Sounds Diminished   Edema Generalized   Edema Generalized None   Pt tolerated 2hr HD tx well. No fluid removed per order. Pt stable. BS checked at beginning of tx 96, End of tx 112. Report to floor nurse given.

## 2022-04-17 NOTE — PROGRESS NOTES
ENDOCRINOLOGY PROGRESS NOTE      Date of admission: 3/27/2022  Date of service: 4/17/2022  Admitting physician: Yfn Fontana DO   Primary Care Physician: Miguel A Caputo MD  Consultant physician: Edmund Fernandez MD     Reason for the consultation:  DM type 1, labile diabetes     History of Present Illness: The history is provided from medical records, pt sedated intubated     Harika Dey is a 36 y.o. old female nursing home resident with PMH of Type I DM, ESRD on PD,HTN,hypothyroidism, infective endocarditis and other listed below admitted to [facility] on 3/27/2022 because of AMS. Endocrine service was consulted for DM management. subjective   Pt was seen and examined, not eating, BG was high and AG was open.      Point of care glucose monitoring (Independently reviewed)   Recent Labs     04/16/22  2257 04/17/22  0219 04/17/22  0514 04/17/22  0718 04/17/22  1042 04/17/22  1255 04/17/22  1556 04/17/22  1736   GLUMET 186* 217* 243* 264* 182* 122* 112* 105*     Scheduled Meds:   insulin glargine  1 Units SubCUTAneous QAM    insulin lispro  0-3 Units SubCUTAneous 4x Daily AC & HS    carvedilol  12.5 mg Oral BID    [START ON 4/18/2022] erythromycin  500 mg IntraVENous Once per day on Mon Wed Fri    [Held by provider] bisacodyl  5 mg Oral Daily    [Held by provider] metoclopramide  5 mg Oral 4x Daily    pantoprazole  40 mg Oral BID AC    promethazine  25 mg Oral TID AC    acetaminophen  650 mg Oral Q6H    Or    acetaminophen  650 mg Rectal Q6H    melatonin  6 mg Oral Nightly    sodium chloride flush  5-40 mL IntraVENous 2 times per day    heparin flush  1 mL IntraVENous 2 times per day    mirtazapine  15 mg Oral Nightly    escitalopram  10 mg Oral Daily    ARIPiprazole  5 mg Oral Nightly    rOPINIRole  0.25 mg Oral Nightly    levothyroxine  88 mcg Oral Daily    epoetin nena-epbx  3,000 Units SubCUTAneous Once per day on Mon Wed Fri    Vitamin D  1,000 Units Per NG tube Daily     PRN Meds: [Held by provider] labetalol, 5 mg, Q6H PRN  glucose, 4 each, PRN  diphenhydrAMINE, 25 mg, Q6H PRN  magic (miracle) mouthwash, 5 mL, 4x Daily PRN  ondansetron, 4 mg, Q4H PRN  trimethobenzamide, 200 mg, Q6H PRN  loperamide, 2 mg, 4x Daily PRN  sodium chloride flush, 5-40 mL, PRN  sodium chloride, , PRN  heparin flush, 1 mL, PRN  white petrolatum, , BID PRN  polyethylene glycol, 17 g, Daily PRN  dextrose, 12.5 g, PRN  glucagon (rDNA), 1 mg, PRN  dextrose, 100 mL/hr, PRN  albuterol, 2.5 mg, Q6H PRN      Continuous Infusions:   dextrose 20 mL/hr at 04/17/22 1726    sodium chloride      dextrose Stopped (04/12/22 2307)       Review of Systems  Non-obtainable     OBJECTIVE    BP (!) 174/105   Pulse 92   Temp 97.1 °F (36.2 °C) (Temporal)   Resp 18   Ht 5' 4\" (1.626 m)   Wt 133 lb 9.6 oz (60.6 kg)   SpO2 97%   BMI 22.93 kg/m²     Intake/Output Summary (Last 24 hours) at 4/17/2022 1837  Last data filed at 4/17/2022 1739  Gross per 24 hour   Intake 346 ml   Output --   Net 346 ml     Physical examination:  General: awake alert, oriented x3  HEENT: normocephalic non traumatic, no exophthalmos   Neck: supple, thyroid tenderness,  Pulm: Clear equal air entry no added sounds  CVS: S1 + S2  Abd: soft lax, no tenderness  Skin: warm, no lesions, no rash.       Review of Laboratory Data:  I personally reviewed the following labs:   Recent Labs     04/17/22  0456   WBC 6.0   RBC 4.17   HGB 10.5*   HCT 33.0*   MCV 79.1*   MCH 25.2*   MCHC 31.8*   RDW 21.1*      MPV NOT CALC     Recent Labs     04/17/22  0456 04/17/22  0745 04/17/22  1130    133 132   K 6.6* 5.3* 5.0   CL 94* 97* 98   CO2 20* 18* 22   BUN 11 12 13   CREATININE 2.4* 2.6* 2.7*   GLUCOSE 263* 306* 163*   CALCIUM 9.4 9.4 9.5     Beta-Hydroxybutyrate   Date Value Ref Range Status   04/17/2022 0.74 (H) 0.02 - 0.27 mmol/L Final   04/13/2022 2.56 (H) 0.02 - 0.27 mmol/L Final   04/06/2022 >4.50 (H) 0.02 - 0.27 mmol/L Final     Lab Results   Component Value Date    LABA1C 7.7 03/02/2022    LABA1C 8.7 12/08/2021    LABA1C 10.2 11/23/2021     Lab Results   Component Value Date/Time    TSH 5.370 (H) 03/28/2022 01:56 AM    T4FREE 1.10 03/28/2022 01:56 AM    B7QWRLR 8.6 11/05/2015 02:20 AM    FT3 1.3 (L) 10/31/2020 10:43 AM    X7OPYRO 36.73 (L) 07/20/2020 02:00 PM     Lab Results   Component Value Date    LABA1C 7.7 03/02/2022    GLUCOSE 163 04/17/2022    GLUCOSE 130 05/18/2012    MALBCR 2949.3 01/15/2020    LABMICR 1740.1 01/15/2020    LABCREA 59 01/15/2020    LABCREA 61 01/15/2020     Lab Results   Component Value Date    TRIG 82 01/14/2020    HDL 33 01/14/2020    LDLCALC 46 01/14/2020    CHOL 95 01/14/2020       Blood culture   Lab Results   Component Value Date    BC 5 Days no growth 03/28/2022    BC 5 Days no growth 03/27/2022       Radiology:  XR CHEST PORTABLE   Final Result   Borderline cardiac size with vascular congestion. XR ABDOMEN (KUB) (SINGLE AP VIEW)   Final Result   No evidence of bowel obstruction. XR CHEST PORTABLE   Final Result   1. The lungs are clear. There is no infiltrate or effusion. 2. Stable position of the support lines and tubes. XR CHEST PORTABLE   Final Result   1. There is no acute cardiopulmonary disease   2. Stable position of the support lines and tubes. XR CHEST PORTABLE   Final Result   Infiltrate and or atelectasis at the left lower lobe. Substantially resolved   infiltrate and or atelectasis on the right. New NG tube. CT HEAD WO CONTRAST   Final Result   No acute intracranial abnormality or hemorrhage. CT ABDOMEN PELVIS WO CONTRAST Additional Contrast? None   Final Result   1. Moderate ascites throughout abdomen and pelvis. 2.  Multiple thick walled loops of small bowel throughout the abdomen could   suggest nonspecific infectious or inflammatory enteritis. 3.  Generalized body wall edema. 4.  Small bilateral pleural effusions with adjacent atelectatic changes. XR ABDOMEN FOR NG/OG/NE TUBE PLACEMENT   Final Result   Enteric tube tip in the body of the stomach. XR CHEST PORTABLE   Final Result   Right perihilar opacity. Medical Records/Labs/Images review:   I personally reviewed and summarized previous records   All labs and imaging were reviewed independently     Mary Maldonado, a 36 y.o.-old female seen today for inpatient diabetes management     Diabetes Mellitus type I    · Poor nutrition and ESRD making her diabetes very brittle   · Labs from this AM showed high AG and high BG   · we recommend the following sq insulin regimen   · lantus 1u daily  · Modified Low dose sliding scale (1u:100>250) AC/HS   · Continue following BG and adjust insulin regimen    Failure to thrive   · Her current main issue and significantly affecting her nutrition   · Plan for PEG-J tube early next week   · Management per primary team     primary Hypothyroidism  · Levothyroxine was adjusted during this admission to 88 mcg daily      ESRD  · Pt at risk for hypoglycemia  · On dialysis, nephrology following    Thank you for allowing us to participate in the care of this patient. Please do not hesitate to contact us with any additional questions. Kristy Serrano MD  Endocrinologist, [facility] Diabetes Care and Endocrinology   [address] [facility] [address]   Phone: [phone]  Fax: [phone]  ----------------------------  An electronic signature was used to authenticate this note.  Kassi Rey MD on 4/17/2022 at 6:37 PM

## 2022-04-17 NOTE — PROGRESS NOTES
Date: 4/16/2022    Time: 9:50 PM    Patient Placed On BIPAP/CPAP/ Non-Invasive Ventilation? Yes    If no must comment. Facial area red/color change? No           If YES are Blister/Lesion present? No   If yes must notify nursing staff  BIPAP/CPAP skin barrier?   Yes    Skin barrier type:mepilexlite       Comments:        Laina Damon RCP

## 2022-04-18 LAB
ANION GAP SERPL CALCULATED.3IONS-SCNC: 11 MMOL/L (ref 7–16)
BUN BLDV-MCNC: 6 MG/DL (ref 6–20)
CALCIUM SERPL-MCNC: 9.2 MG/DL (ref 8.6–10.2)
CHLORIDE BLD-SCNC: 98 MMOL/L (ref 98–107)
CO2: 28 MMOL/L (ref 22–29)
CREAT SERPL-MCNC: 2.2 MG/DL (ref 0.5–1)
EKG ATRIAL RATE: 104 BPM
EKG ATRIAL RATE: 111 BPM
EKG P AXIS: 37 DEGREES
EKG P AXIS: 43 DEGREES
EKG P-R INTERVAL: 130 MS
EKG P-R INTERVAL: 142 MS
EKG Q-T INTERVAL: 360 MS
EKG Q-T INTERVAL: 368 MS
EKG QRS DURATION: 86 MS
EKG QRS DURATION: 88 MS
EKG QTC CALCULATION (BAZETT): 483 MS
EKG QTC CALCULATION (BAZETT): 489 MS
EKG R AXIS: 51 DEGREES
EKG R AXIS: 56 DEGREES
EKG T AXIS: 72 DEGREES
EKG T AXIS: 88 DEGREES
EKG VENTRICULAR RATE: 104 BPM
EKG VENTRICULAR RATE: 111 BPM
GFR AFRICAN AMERICAN: 32
GFR NON-AFRICAN AMERICAN: 32 ML/MIN/1.73
GLUCOSE BLD-MCNC: 110 MG/DL (ref 74–99)
METER GLUCOSE: 107 MG/DL (ref 74–99)
METER GLUCOSE: 121 MG/DL (ref 74–99)
METER GLUCOSE: 142 MG/DL (ref 74–99)
METER GLUCOSE: 142 MG/DL (ref 74–99)
METER GLUCOSE: 161 MG/DL (ref 74–99)
METER GLUCOSE: 167 MG/DL (ref 74–99)
METER GLUCOSE: 207 MG/DL (ref 74–99)
METER GLUCOSE: 208 MG/DL (ref 74–99)
METER GLUCOSE: 215 MG/DL (ref 74–99)
METER GLUCOSE: 222 MG/DL (ref 74–99)
METER GLUCOSE: 263 MG/DL (ref 74–99)
POTASSIUM SERPL-SCNC: 3.5 MMOL/L (ref 3.5–5)
SODIUM BLD-SCNC: 137 MMOL/L (ref 132–146)

## 2022-04-18 PROCEDURE — 99232 SBSQ HOSP IP/OBS MODERATE 35: CPT | Performed by: INTERNAL MEDICINE

## 2022-04-18 PROCEDURE — 99232 SBSQ HOSP IP/OBS MODERATE 35: CPT | Performed by: STUDENT IN AN ORGANIZED HEALTH CARE EDUCATION/TRAINING PROGRAM

## 2022-04-18 PROCEDURE — 6360000002 HC RX W HCPCS: Performed by: INTERNAL MEDICINE

## 2022-04-18 PROCEDURE — 6370000000 HC RX 637 (ALT 250 FOR IP): Performed by: INTERNAL MEDICINE

## 2022-04-18 PROCEDURE — 2580000003 HC RX 258

## 2022-04-18 PROCEDURE — 94660 CPAP INITIATION&MGMT: CPT

## 2022-04-18 PROCEDURE — 2580000003 HC RX 258: Performed by: INTERNAL MEDICINE

## 2022-04-18 PROCEDURE — 6360000002 HC RX W HCPCS: Performed by: STUDENT IN AN ORGANIZED HEALTH CARE EDUCATION/TRAINING PROGRAM

## 2022-04-18 PROCEDURE — 80048 BASIC METABOLIC PNL TOTAL CA: CPT

## 2022-04-18 PROCEDURE — 6360000002 HC RX W HCPCS: Performed by: NURSE PRACTITIONER

## 2022-04-18 PROCEDURE — S5553 INSULIN LONG ACTING 5 U: HCPCS | Performed by: INTERNAL MEDICINE

## 2022-04-18 PROCEDURE — 6370000000 HC RX 637 (ALT 250 FOR IP): Performed by: STUDENT IN AN ORGANIZED HEALTH CARE EDUCATION/TRAINING PROGRAM

## 2022-04-18 PROCEDURE — 6370000000 HC RX 637 (ALT 250 FOR IP): Performed by: NURSE PRACTITIONER

## 2022-04-18 PROCEDURE — 6370000000 HC RX 637 (ALT 250 FOR IP)

## 2022-04-18 PROCEDURE — 36415 COLL VENOUS BLD VENIPUNCTURE: CPT

## 2022-04-18 PROCEDURE — 99233 SBSQ HOSP IP/OBS HIGH 50: CPT | Performed by: INTERNAL MEDICINE

## 2022-04-18 PROCEDURE — 82962 GLUCOSE BLOOD TEST: CPT

## 2022-04-18 PROCEDURE — 1200000000 HC SEMI PRIVATE

## 2022-04-18 RX ORDER — INSULIN GLARGINE-YFGN 100 [IU]/ML
1 INJECTION, SOLUTION SUBCUTANEOUS NIGHTLY
Status: DISCONTINUED | OUTPATIENT
Start: 2022-04-18 | End: 2022-04-20

## 2022-04-18 RX ORDER — METOCLOPRAMIDE 5 MG/1
5 TABLET ORAL
Status: DISCONTINUED | OUTPATIENT
Start: 2022-04-18 | End: 2022-04-19

## 2022-04-18 RX ORDER — AMLODIPINE BESYLATE 5 MG/1
5 TABLET ORAL DAILY
Status: DISCONTINUED | OUTPATIENT
Start: 2022-04-18 | End: 2022-04-18

## 2022-04-18 RX ORDER — DEXTROSE AND SODIUM CHLORIDE 5; .9 G/100ML; G/100ML
INJECTION, SOLUTION INTRAVENOUS CONTINUOUS
Status: DISCONTINUED | OUTPATIENT
Start: 2022-04-19 | End: 2022-04-19

## 2022-04-18 RX ORDER — LIDOCAINE HYDROCHLORIDE 20 MG/ML
15 SOLUTION OROPHARYNGEAL
Status: DISCONTINUED | OUTPATIENT
Start: 2022-04-18 | End: 2022-04-18

## 2022-04-18 RX ADMIN — Medication 20 ML: at 00:01

## 2022-04-18 RX ADMIN — DEXTROSE AND SODIUM CHLORIDE: 5; 900 INJECTION, SOLUTION INTRAVENOUS at 23:48

## 2022-04-18 RX ADMIN — Medication 20 ML: at 22:27

## 2022-04-18 RX ADMIN — ARIPIPRAZOLE 5 MG: 5 TABLET ORAL at 21:08

## 2022-04-18 RX ADMIN — ESCITALOPRAM 10 MG: 10 TABLET, FILM COATED ORAL at 08:10

## 2022-04-18 RX ADMIN — Medication 10 ML: at 08:30

## 2022-04-18 RX ADMIN — MIRTAZAPINE 15 MG: 15 TABLET, FILM COATED ORAL at 21:08

## 2022-04-18 RX ADMIN — PROMETHAZINE HYDROCHLORIDE 25 MG: 25 TABLET ORAL at 11:40

## 2022-04-18 RX ADMIN — ACETAMINOPHEN 650 MG: 325 TABLET ORAL at 11:40

## 2022-04-18 RX ADMIN — PANTOPRAZOLE SODIUM 40 MG: 40 TABLET, DELAYED RELEASE ORAL at 15:53

## 2022-04-18 RX ADMIN — EPOETIN ALFA-EPBX 3000 UNITS: 3000 INJECTION, SOLUTION INTRAVENOUS; SUBCUTANEOUS at 08:29

## 2022-04-18 RX ADMIN — PANTOPRAZOLE SODIUM 40 MG: 40 TABLET, DELAYED RELEASE ORAL at 06:03

## 2022-04-18 RX ADMIN — DIPHENHYDRAMINE HYDROCHLORIDE 25 MG: 50 INJECTION, SOLUTION INTRAMUSCULAR; INTRAVENOUS at 00:33

## 2022-04-18 RX ADMIN — DIPHENHYDRAMINE HYDROCHLORIDE 25 MG: 50 INJECTION, SOLUTION INTRAMUSCULAR; INTRAVENOUS at 08:11

## 2022-04-18 RX ADMIN — DIPHENHYDRAMINE HYDROCHLORIDE 25 MG: 50 INJECTION, SOLUTION INTRAMUSCULAR; INTRAVENOUS at 14:14

## 2022-04-18 RX ADMIN — ACETAMINOPHEN 650 MG: 325 TABLET ORAL at 17:41

## 2022-04-18 RX ADMIN — SODIUM CHLORIDE, PRESERVATIVE FREE 100 UNITS: 5 INJECTION INTRAVENOUS at 08:30

## 2022-04-18 RX ADMIN — PROMETHAZINE HYDROCHLORIDE 25 MG: 25 TABLET ORAL at 06:03

## 2022-04-18 RX ADMIN — INSULIN GLARGINE-YFGN 1 UNITS: 100 INJECTION, SOLUTION SUBCUTANEOUS at 15:53

## 2022-04-18 RX ADMIN — PROMETHAZINE HYDROCHLORIDE 25 MG: 25 TABLET ORAL at 16:12

## 2022-04-18 RX ADMIN — Medication 1000 UNITS: at 08:30

## 2022-04-18 RX ADMIN — CARVEDILOL 12.5 MG: 6.25 TABLET, FILM COATED ORAL at 21:08

## 2022-04-18 RX ADMIN — DIPHENHYDRAMINE HYDROCHLORIDE 25 MG: 50 INJECTION, SOLUTION INTRAMUSCULAR; INTRAVENOUS at 21:07

## 2022-04-18 RX ADMIN — ROPINIROLE 0.25 MG: 0.25 TABLET, FILM COATED ORAL at 21:08

## 2022-04-18 RX ADMIN — SODIUM CHLORIDE, PRESERVATIVE FREE 100 UNITS: 5 INJECTION INTRAVENOUS at 21:51

## 2022-04-18 RX ADMIN — LEVOTHYROXINE SODIUM 88 MCG: 0.09 TABLET ORAL at 06:03

## 2022-04-18 RX ADMIN — Medication 6 MG: at 21:08

## 2022-04-18 RX ADMIN — Medication 5 ML: at 14:15

## 2022-04-18 RX ADMIN — AMLODIPINE BESYLATE 5 MG: 5 TABLET ORAL at 17:09

## 2022-04-18 RX ADMIN — CARVEDILOL 12.5 MG: 6.25 TABLET, FILM COATED ORAL at 08:10

## 2022-04-18 RX ADMIN — LOPERAMIDE HYDROCHLORIDE 2 MG: 2 CAPSULE ORAL at 21:07

## 2022-04-18 RX ADMIN — LOPERAMIDE HYDROCHLORIDE 2 MG: 2 CAPSULE ORAL at 08:10

## 2022-04-18 ASSESSMENT — PAIN SCALES - GENERAL
PAINLEVEL_OUTOF10: 0
PAINLEVEL_OUTOF10: 6
PAINLEVEL_OUTOF10: 0
PAINLEVEL_OUTOF10: 9
PAINLEVEL_OUTOF10: 6
PAINLEVEL_OUTOF10: 8

## 2022-04-18 NOTE — PROGRESS NOTES
/100, denies H/A, C/P , palpitations. BP med adm. BS 76 , will give dextrose IV. Will monitor. 2110, dextrose IV adm, BS rechecked 199, will cont to monitor. 2200 /100. Patient asleep, no distress noted, when awakened, denies H/A, C/P, palpitations. Noted increasing  antihypertensives on hold  Today. Will monitor.

## 2022-04-18 NOTE — CARE COORDINATION
Peg tube placement is scheduled for Tuesday April 19th. Pt receives HD. BS and  BP continues to have ups and downs. Updated [facility] ([phone]). SW/CM to follow for discharge needs. Referral to [facility] for [address] [place] [address] was made 04/15/22.    ANDREW Murrell.S.W.  [phone]

## 2022-04-18 NOTE — PROGRESS NOTES
Department of Internal Medicine  Nephrology Progress Note      Events reviewed. SUBJECTIVE:  We are following Miss Matthew Caputo for ESKD on HD. Patient reports nausea.      PHYSICAL EXAM:      Vitals:    VITALS:  BP (!) 178/88   Pulse 97   Temp 97.7 °F (36.5 °C) (Temporal)   Resp 18   Ht 5' 4\" (1.626 m)   Wt 133 lb 9.6 oz (60.6 kg)   SpO2 97%   BMI 22.93 kg/m²   24HR INTAKE/OUTPUT:      Intake/Output Summary (Last 24 hours) at 4/18/2022 1121  Last data filed at 4/17/2022 1739  Gross per 24 hour   Intake 226 ml   Output --   Net 226 ml       Access: RIJ tunneled dialysis catheter  Constitutional: Awake, alert, NAD  HEENT:  PERRL, normocephalic, atraumatic  Respiratory:  CTA bilateral  Cardiovascular/Edema:  ST, RRR, no murmur gallop or rub  Gastrointestinal:  abd distended, c/o persistent abdominal pain  Neurologic: A&OX3 follows commands, no focal deficit  Skin:  Warm, dry, ecchymotic areas to abdomen    Other:    Scheduled Meds:   [Held by provider] metoclopramide  5 mg Oral TID AC    labetalol  10 mg IntraVENous Once    insulin glargine  1 Units SubCUTAneous QAM    insulin lispro  0-3 Units SubCUTAneous 4x Daily AC & HS    carvedilol  12.5 mg Oral BID    [START ON 4/20/2022] erythromycin  500 mg IntraVENous Once per day on Tue Thu Sat    [Held by provider] bisacodyl  5 mg Oral Daily    pantoprazole  40 mg Oral BID AC    promethazine  25 mg Oral TID AC    acetaminophen  650 mg Oral Q6H    Or    acetaminophen  650 mg Rectal Q6H    melatonin  6 mg Oral Nightly    sodium chloride flush  5-40 mL IntraVENous 2 times per day    heparin flush  1 mL IntraVENous 2 times per day    mirtazapine  15 mg Oral Nightly    escitalopram  10 mg Oral Daily    ARIPiprazole  5 mg Oral Nightly    rOPINIRole  0.25 mg Oral Nightly    levothyroxine  88 mcg Oral Daily    epoetin nena-epbx  3,000 Units SubCUTAneous Once per day on Mon Wed Fri    Vitamin D  1,000 Units Per NG tube Daily     Continuous infiltrate and or atelectasis on the right.  New NG tube.           BRIEF SUMMARY OF INITIAL CONSULT:    Briefly, Miss Humaira Perez is a 36year-old female with a PMH of ESRD on hemodialysis 3 days/wk, on TTS schedule at [facility] I DM, poorly controlled with recurrent admissions for DKA, HTN, gastroparesis, ascites, infective endocarditis, cardiac arrest, hypothyroidism, recent Covid, and depression who was admitted on March 27, 2022 after she was found unresponsive at the SNF where she resides. Patient was found to be profoundly hypoglycemic and EMS was called. She was intubated in ER for airway protection as she remained unresponsive. CT head showed no acute intracranial abnormality or hemorrhage, chest x-ray demonstrates right perihilar opacity concerning for aspiration pneumonia. She was ultimately admitted to MICU for further evaluation. Labs on admission were significant for sodium 135, potassium 5.0, chloride 100, bicarbonate 25, BUN 38, creatinine 4.9, proBNP >70,000. We are consulted for dialysis management. Problems resolved. · Hypoglycemia, was on D10, now hyperglycemic with +ketones (beta-hydroxybutyrate >4.5)  · Acute respiratory failure, 2/2 aspiration pneumonia? On 40% Fio2. Extubated 7/60  · Acute metabolic encephalopathy 2/2 hypoglycemia. Resolving   · Acidemia, pH 7.311, PCO2 35.0, HCO3 06.0, metabolic acidosis from DKA  · Hx recurrent C. difficile infection, on flagyl po Q 8hrs   · Hx of MDRO UTI  · Aspiration pneumonia? S/p piperacillin      IMPRESSION/RECOMMENDATIONS:      1. ESKD 3 times a week TTS via RIJ tunneled dialysis catheter. 2. HTN, on carvediolol 12.5 mg bid  3. MBD of CKD, binders on hold. 4. Anemia of CKD, continue epoetin alpha 3,000 unit 3 times/wk. s/p transfusion   5. Vitamin D deficiency, on ergocalciferol 1000 po daily  ------------------------------------------------------  6.  Type I DM, poorly controlled with frequent readmissions for DKA, on SSI, lantus decreased to 1 unit nightly   7. Restless leg syndrome, on ropinirole  8. Depression, on escitalopram and Abilify  9. Hypothyroidism, on levothyroxine   10. Gastroparesis, on metoclopramide,  For EGD and pyloric dilatation with Botox injection outpatient per GI. On erythromycin with HD when available. 11. Nutrition: Carb control with poor appetite, n.p.o at midnight.  Start D5NS at 50cc/hour.         Plan:     · Start D5NS at 50cc/hour at midnight  · Start amlodipine 5 mg p.o. daily  · Continue HD three times/wk, TTS, for HD tomorrow   · Erythromycin 500 mg IV with HD when available  · Continue epoetin alpha 3,000 units 3 times/wk  · Continue carvedilol to 12.5  mg bid  · Continue to monitor glucose levels  · Continue to monitor daily labs  · Patient is for J-PEG tomorrow with Dr. Rahul Mendez      Case and plan discussed with Dr. Marcy Qiu    Electronically signed by HEBER Chase - CNP on 4/18/2022 at 11:21 AM    Agree as annotated  Traci Perera MD

## 2022-04-18 NOTE — PROGRESS NOTES
[facility]  Internal Medicine Residency Program  Progress Note - House Team     Patient:  Mirna Le 36 y.o. female MRN: [id number]     Date of Service: 4/18/2022     CC: AMS, hypoglycemia  Overnight events: No acute events overnight     Subjective     Patient was seen and examined this morning at bedside in no acute distress. No acute events overnight. Patient sugar was 76 last evening, given D50 with improvement to 199. Patient states that nausea, vomiting and abdominal pain are improved compared to yesterday. States that she's having tooth pain. Objective     Physical Exam:  · Vitals: BP (!) 178/88   Pulse 97   Temp 97.7 °F (36.5 °C) (Temporal)   Resp 18   Ht 5' 4\" (1.626 m)   Wt 133 lb 9.6 oz (60.6 kg)   SpO2 97%   BMI 22.93 kg/m²     · I & O - 24hr: No intake/output data recorded. · General Appearance: alert, appears stated age and cooperative  · HEENT:  Head: Normocephalic, no lesions, without obvious abnormality. · Neck: no adenopathy, no carotid bruit, no JVD, supple, symmetrical, trachea midline and thyroid not enlarged, symmetric, no tenderness/mass/nodules  · Lung: clear to auscultation bilaterally  · Heart: regular rate and rhythm, S1, S2 normal, no murmur, click, rub or gallop  · Abdomen: soft, non-tender; bowel sounds normal; no masses,  no organomegaly  · Extremities:  extremities normal, atraumatic, no cyanosis or edema  · Musculokeletal: No joint swelling, no muscle tenderness. ROM normal in all joints of extremities.    · Neurologic: Mental status: Alert, oriented, thought content appropriate  ·   Subject  Pertinent Labs & Imaging Studies   jorge alberto  CBC:   Lab Results   Component Value Date    WBC 6.0 04/17/2022    RBC 4.17 04/17/2022    HGB 10.5 04/17/2022    HCT 33.0 04/17/2022    MCV 79.1 04/17/2022    MCH 25.2 04/17/2022    MCHC 31.8 04/17/2022    RDW 21.1 04/17/2022     04/17/2022    MPV NOT CALC 04/17/2022     BMP:    Lab Results   Component Value Date     04/18/2022    K 3.5 04/18/2022    K 4.0 04/14/2022    CL 98 04/18/2022    CO2 28 04/18/2022    BUN 6 04/18/2022    LABALBU 3.4 04/14/2022    LABALBU 4.1 05/18/2012    CREATININE 2.2 04/18/2022    CALCIUM 9.2 04/18/2022    GFRAA 32 04/18/2022    LABGLOM 32 04/18/2022    GLUCOSE 110 04/18/2022    GLUCOSE 130 05/18/2012       XR CHEST PORTABLE   Final Result   Borderline cardiac size with vascular congestion. XR ABDOMEN (KUB) (SINGLE AP VIEW)   Final Result   No evidence of bowel obstruction. XR CHEST PORTABLE   Final Result   1. The lungs are clear. There is no infiltrate or effusion. 2. Stable position of the support lines and tubes. XR CHEST PORTABLE   Final Result   1. There is no acute cardiopulmonary disease   2. Stable position of the support lines and tubes. XR CHEST PORTABLE   Final Result   Infiltrate and or atelectasis at the left lower lobe. Substantially resolved   infiltrate and or atelectasis on the right. New NG tube. CT HEAD WO CONTRAST   Final Result   No acute intracranial abnormality or hemorrhage. CT ABDOMEN PELVIS WO CONTRAST Additional Contrast? None   Final Result   1. Moderate ascites throughout abdomen and pelvis. 2.  Multiple thick walled loops of small bowel throughout the abdomen could   suggest nonspecific infectious or inflammatory enteritis. 3.  Generalized body wall edema. 4.  Small bilateral pleural effusions with adjacent atelectatic changes. XR ABDOMEN FOR NG/OG/NE TUBE PLACEMENT   Final Result   Enteric tube tip in the body of the stomach. XR CHEST PORTABLE   Final Result   Right perihilar opacity. Resident's Assessment and Plan     Assessment:     1. Hypersensitive IDDM1-A1c 7.7% this admission  2. Nausea/Vomiting w/ Abdominal pain with Hx of gastroparesis 2/2 Hx of IDDM  3. HTN, on coreg BID  4. ESRD on HD TTS  5. Hypothyroidism-TSH 5.37, FT4 1.10 normal  6.  Acute on patient/family  Remainder of medical problems as per resident note. Long standing dm1-- autonomic neuroapthy +gastroparesis  Very brittle dm - even low doses insulin can drop BS-- when NOT eating    Plan peg J-- GI last saw on Tnursday, uncertain when NPO  H52 drip some insulin- high risk DKA - noted type 1 DM    ACD - getting epo  Hormonal workup otherwise [facility] Course: The pt is a 37 y/o female with a PMHx of brittle T1DM, ESRD on HD, HTN, recent C. Diff infection, and endocarditis w/ consequent septic emboli formation, who presented to the ED from her long-term care facility and was hospitalized on 3/28/2022. Her presenting complaint was acute encephalopathy. Per note review, the pt's LKW was 1100 on 3/28/2022. She was found lethartic and altered at approx. 1700 by NH staff, and subsequent POCT BG was too low to be read by the glucometer. EMS was called, and the pt was transported emergently to [facility]. On arrival, she was intubated for airway protection, and was transferred to the MICU. On arrival, she was tachycardic, hypertensive, afebrile, O2 saturation 100% on the ventilator. Pro-BNP and troponin were elevated, and labs were also notable for anemia to 6.5 (baseline 6.0-8.0). UA showing pyuria and hematuria, moderate bacteria, and large LE. CXR showing possible infiltrate/edema, and CT abdomen/pelvis concerning for possible enteritis. CT head (-)ve.      Once in the MICU, the pt was started on empiric antibiotic treatment for possible aspiration pneumonia/pneumonitis, as well as suspected enteritis. Hemodialysis was restarted, and Endocrinology was consulted for aid with managing extremely labile blood sugars. Infectious cultures were negative, and the patient's antimicrobial regimen was de-escalated to Flagyl q8hrs (end date 4/4/2022). Her blood sugars were controlled w/ 1U Lantus nightly and a modified sliding scale.  She was successfully extubated, and has been tolerating room air well since that time.      On the floors patient has had difficulty in controlling blood glucose levels. Patient was on 1U Lantus w/ a modified sliding scale but still patient would have episodes of hypo and hyperglycemia. Patient complained of abdominal pain and nausea and would consistently not eat due to these symptoms. Patient was on Bentyl, Reglan, Zofran, Pepcid, Phenergan all with minimal relief. Patient was not eating much and on 4/4 and 4/5 patient's 1u Lantus was held despite being scheduled for nightly Lantus. BHB on 4/6 showed >4.50. Patient was treated on the floors with subq insulin and fluid. AG closed x2 and patient began feeling better. Patient still with episodes of hyperglycemia in 350s as well as hypoglycemia episodes. GI consulted with recs of starting patient on PPI BID as well as considering PEG-J tube at this time.  Currently plans for patient to undergo PEG-J

## 2022-04-18 NOTE — PROGRESS NOTES
Comprehensive Nutrition Assessment    Type and Reason for Visit:  Reassess    Nutrition Recommendations/Plan: Continue current diet order. Recommend Ensure clear BID to optimize intake. Will monitor intake and for any changes in feeding modality. Will provide TF recs pending POC/if EN initiated. Please consult RD as needed. Nutrition Assessment:  Pt. remains at nurtional risk d/t ongoing poor po intake. Noted plans for PEG-J.  Pt. admit w/ acute encephalopathy & AMS 2/2 hypoglycemia; (resolved) pt s/p extubated 3/30; noted ESRD (on HD); hx of DM, Ketoacidosis 2/2 DM, drug abuse, non-compliance w/ meds, malnutrition. Noted pt. remains w/ nausea/abd pain/poor appetite. monitor intake/for changes in feeding modality. Malnutrition Assessment:  Malnutrition Status: At risk for malnutrition (Comment)    Context:  Chronic Illness     Findings of the 6 clinical characteristics of malnutrition:  Energy Intake:  Mild decrease in energy intake (Comment)  Weight Loss:  Unable to assess (d/t wt flucc 2/2 ESRD + HD; measured wt hx ranging from 129-169# within the past yr)     Body Fat Loss:  Unable to assess     Muscle Mass Loss:  Unable to assess    Fluid Accumulation:  No significant fluid accumulation     Strength:  Not Performed    Estimated Daily Nutrient Needs:  Energy (kcal):  3070-8051; Weight Used for Energy Requirements:  Current     Protein (g):  73-85g (1.2-1.4g/kgCBW (ESRDon HD as tolerated)); Weight Used for Protein Requirements:  Current        Fluid (ml/day):  per renal management; Method Used for Fluid Requirements:  Standard Renal      Nutrition Related Findings:  A&Ox4; active BS; no edema; +I/O 2.4L, tender abd, Nausea and loss of appetite, Hyperglycemia, Oral Candidiasis      Wounds:  None       Current Nutrition Therapies:    ADULT ORAL NUTRITION SUPPLEMENT; Dinner, Lunch; Frozen Oral Supplement  ADULT DIET;  Clear Liquid; 4 carb choices (60 gm/meal)    Anthropometric Measures:  · Height: 5' 4\" (162.6 cm)  · Current Body Weight: 133 lb 9.6 oz (60.6 kg) (4/09 BS)   · Admission Body Weight: 138 lb 3.7 oz (62.7 kg) (3/28-BS)    · Usual Body Weight:  (note w/in 1yr wts range from 129#-169# possibly d/t fluid shifts 2/2 ESRD + HD)     · Ideal Body Weight: 120 lbs; % Ideal Body Weight 117.5 %   · BMI: 22.9  · BMI Categories: Normal Weight (BMI 18.5-24. 9)       Nutrition Diagnosis:   · Inadequate oral intake related to altered GI function as evidenced by NPO or clear liquid status due to medical condition,vomiting,nausea,diarrhea,intake 0-25%      Nutrition Interventions:   Food and/or Nutrient Delivery:  Continue Current Diet,Modify Oral Nutrition Supplement (Ensure Clear BID)  Nutrition Education/Counseling:  Education not indicated   Coordination of Nutrition Care:  Continue to monitor while inpatient    Goals:  Nutrition progression/tolerance       Nutrition Monitoring and Evaluation:   Behavioral-Environmental Outcomes:  None Identified   Food/Nutrient Intake Outcomes:  Food and Nutrient Intake,Supplement Intake  Physical Signs/Symptoms Outcomes:  Biochemical Data,Nutrition Focused Physical Findings,Skin,Weight,Chewing or Swallowing,GI Status,Fluid Status or Edema     Discharge Planning:     Too soon to determine     Electronically signed by Heron Chin, [address] on 4/18/22 at 2:02 PM EDT    Contact: ext [phone]

## 2022-04-18 NOTE — PROGRESS NOTES
ENDOCRINOLOGY PROGRESS NOTE      Date of admission: 3/27/2022  Date of service: 4/18/2022  Admitting physician: Sushil Mcdaniel DO   Primary Care Physician: Yonathan Hillman MD  Consultant physician: Meryle Laurence MD     Reason for the consultation:  DM type 1, labile diabetes     History of Present Illness: The history is provided from medical records, pt sedated intubated     Fidel Coon is a 36 y.o. old female nursing home resident with PMH of Type I DM, ESRD on PD,HTN,hypothyroidism, infective endocarditis and other listed below admitted to [facility] on 3/27/2022 because of AMS. Endocrine service was consulted for DM management. subjective   Pt was seen and examined, continues not eating, BG was better controlled.  AG has closed      Point of care glucose monitoring (Independently reviewed)   Recent Labs     04/17/22  1736 04/17/22 2029 04/17/22  2118 04/18/22  0027 04/18/22  0212 04/18/22  0405 04/18/22  0602 04/18/22  0810   GLUMET 105* 76 199* 167* 142* 121* 107* 161*     Scheduled Meds:   metoclopramide  5 mg Oral TID AC    labetalol  10 mg IntraVENous Once    insulin glargine  1 Units SubCUTAneous QAM    insulin lispro  0-3 Units SubCUTAneous 4x Daily AC & HS    carvedilol  12.5 mg Oral BID    [START ON 4/20/2022] erythromycin  500 mg IntraVENous Once per day on Tue Thu Sat    [Held by provider] bisacodyl  5 mg Oral Daily    pantoprazole  40 mg Oral BID AC    promethazine  25 mg Oral TID AC    acetaminophen  650 mg Oral Q6H    Or    acetaminophen  650 mg Rectal Q6H    melatonin  6 mg Oral Nightly    sodium chloride flush  5-40 mL IntraVENous 2 times per day    heparin flush  1 mL IntraVENous 2 times per day    mirtazapine  15 mg Oral Nightly    escitalopram  10 mg Oral Daily    ARIPiprazole  5 mg Oral Nightly    rOPINIRole  0.25 mg Oral Nightly    levothyroxine  88 mcg Oral Daily    epoetin nena-epbx  3,000 Units SubCUTAneous Once per day on Mon Wed Fri    Vitamin D  1,000 Units Per NG tube Daily     PRN Meds:   lidocaine viscous hcl, 15 mL, Q3H PRN  [Held by provider] labetalol, 5 mg, Q6H PRN  glucose, 4 each, PRN  diphenhydrAMINE, 25 mg, Q6H PRN  magic (miracle) mouthwash, 5 mL, 4x Daily PRN  ondansetron, 4 mg, Q4H PRN  trimethobenzamide, 200 mg, Q6H PRN  loperamide, 2 mg, 4x Daily PRN  sodium chloride flush, 5-40 mL, PRN  sodium chloride, , PRN  heparin flush, 1 mL, PRN  white petrolatum, , BID PRN  polyethylene glycol, 17 g, Daily PRN  dextrose, 12.5 g, PRN  glucagon (rDNA), 1 mg, PRN  dextrose, 100 mL/hr, PRN  albuterol, 2.5 mg, Q6H PRN      Continuous Infusions:   dextrose 5 mL/hr at 04/17/22 2359    sodium chloride      dextrose Stopped (04/12/22 2307)       Review of Systems  Non-obtainable     OBJECTIVE    BP (!) 178/88   Pulse 97   Temp 97.7 °F (36.5 °C) (Temporal)   Resp 18   Ht 5' 4\" (1.626 m)   Wt 133 lb 9.6 oz (60.6 kg)   SpO2 97%   BMI 22.93 kg/m²     Intake/Output Summary (Last 24 hours) at 4/18/2022 0945  Last data filed at 4/17/2022 1739  Gross per 24 hour   Intake 346 ml   Output --   Net 346 ml     Physical examination:  General: awake alert, oriented x3  HEENT: normocephalic non traumatic, no exophthalmos   Neck: supple, thyroid tenderness,  Pulm: Clear equal air entry no added sounds  CVS: S1 + S2  Abd: soft lax, no tenderness  Skin: warm, no lesions, no rash.       Review of Laboratory Data:  I personally reviewed the following labs:   Recent Labs     04/17/22  0456   WBC 6.0   RBC 4.17   HGB 10.5*   HCT 33.0*   MCV 79.1*   MCH 25.2*   MCHC 31.8*   RDW 21.1*      MPV NOT CALC     Recent Labs     04/17/22  0745 04/17/22  1130 04/18/22  0542    132 137   K 5.3* 5.0 3.5   CL 97* 98 98   CO2 18* 22 28   BUN 12 13 6   CREATININE 2.6* 2.7* 2.2*   GLUCOSE 306* 163* 110*   CALCIUM 9.4 9.5 9.2     Beta-Hydroxybutyrate   Date Value Ref Range Status   04/17/2022 0.74 (H) 0.02 - 0.27 mmol/L Final   04/13/2022 2.56 (H) 0.02 - 0.27 mmol/L Final   04/06/2022 >4.50 (H) 0.02 - 0.27 mmol/L Final     Lab Results   Component Value Date    LABA1C 7.7 03/02/2022    LABA1C 8.7 12/08/2021    LABA1C 10.2 11/23/2021     Lab Results   Component Value Date/Time    TSH 5.370 (H) 03/28/2022 01:56 AM    T4FREE 1.10 03/28/2022 01:56 AM    B0LEEXX 8.6 11/05/2015 02:20 AM    FT3 1.3 (L) 10/31/2020 10:43 AM    L9AMCRT 36.73 (L) 07/20/2020 02:00 PM     Lab Results   Component Value Date    LABA1C 7.7 03/02/2022    GLUCOSE 110 04/18/2022    GLUCOSE 130 05/18/2012    MALBCR 2949.3 01/15/2020    LABMICR 1740.1 01/15/2020    LABCREA 59 01/15/2020    LABCREA 61 01/15/2020     Lab Results   Component Value Date    TRIG 82 01/14/2020    HDL 33 01/14/2020    LDLCALC 46 01/14/2020    CHOL 95 01/14/2020       Blood culture   Lab Results   Component Value Date    BC 5 Days no growth 03/28/2022    BC 5 Days no growth 03/27/2022       Radiology:  XR CHEST PORTABLE   Final Result   Borderline cardiac size with vascular congestion. XR ABDOMEN (KUB) (SINGLE AP VIEW)   Final Result   No evidence of bowel obstruction. XR CHEST PORTABLE   Final Result   1. The lungs are clear. There is no infiltrate or effusion. 2. Stable position of the support lines and tubes. XR CHEST PORTABLE   Final Result   1. There is no acute cardiopulmonary disease   2. Stable position of the support lines and tubes. XR CHEST PORTABLE   Final Result   Infiltrate and or atelectasis at the left lower lobe. Substantially resolved   infiltrate and or atelectasis on the right. New NG tube. CT HEAD WO CONTRAST   Final Result   No acute intracranial abnormality or hemorrhage. CT ABDOMEN PELVIS WO CONTRAST Additional Contrast? None   Final Result   1. Moderate ascites throughout abdomen and pelvis. 2.  Multiple thick walled loops of small bowel throughout the abdomen could   suggest nonspecific infectious or inflammatory enteritis. 3.  Generalized body wall edema.       4.  Small bilateral pleural effusions with adjacent atelectatic changes. XR ABDOMEN FOR NG/OG/NE TUBE PLACEMENT   Final Result   Enteric tube tip in the body of the stomach. XR CHEST PORTABLE   Final Result   Right perihilar opacity. Medical Records/Labs/Images review:   I personally reviewed and summarized previous records   All labs and imaging were reviewed independently     Mary Maldonado, a 36 y.o.-old female seen today for inpatient diabetes management     Diabetes Mellitus type I    · Poor nutrition and ESRD making her diabetes very brittle   · Labs from this AM showed high AG and high BG   · we recommend the following sq insulin regimen   · lantus 1u daily  · Modified Low dose sliding scale (1u:100>250) AC/HS   · Continue following BG and adjust insulin regimen    Failure to thrive   · Her current main issue and significantly affecting her nutrition   · Plan for PEG-J tube tomorrow   · Management per primary team     Primary Hypothyroidism  · Levothyroxine was adjusted during this admission to 88 mcg daily      ESRD  · Pt at risk for hypoglycemia  · On dialysis, nephrology following    Thank you for allowing us to participate in the care of this patient. Please do not hesitate to contact us with any additional questions. I saw the patient and discussed the management with the resident physician Dr. Elder Chacon. I reviewed and agree with the findings and plan as documented in the resident's note    Charlee Self MD  Endocrinologist, [facility]   [address] [place] [address], [address] [place] [address],[facility] [id number]   Phone: [phone]  Fax: [phone]  ----------------------------  An electronic signature was used to authenticate this note.  Chano Casillas MD on 4/18/2022 at 9:45 AM

## 2022-04-18 NOTE — PROGRESS NOTES
Gastroenterology Progress Note    SUBJECTIVE:      Patient reports persistent abdominal pain. She states that she has not tolerated a PO diet. She is having loose stools. OBJECTIVE    Physical    VITALS:  BP (!) 173/100   Pulse 98   Temp 98 °F (36.7 °C) (Temporal)   Resp 18   Ht 5' 4\" (1.626 m)   Wt 133 lb 9.6 oz (60.6 kg)   SpO2 98%   BMI 22.93 kg/m²   Physical Exam:  General: Chronically ill-appearing, NAD  HEENT: PERRLA, EOMI, Anicteric sclera, MMM, no rhinorrhea  Cards: RRR, no LE edema  Resp: Breathing comfortably on room air, good air movement, no use of accessory muscles, no audible wheezing  Abdomen: soft, ND. Tender  Extremities: Moves all extremities, no effusions or bruising. Skin: No rashes or jaundice  Neuro: A&O x 3, CN grossly intact, non-focal exam    ASSESSMENT AND PLAN      31y/F w/ history of poorly controlled DMI c/b ESRD on HD and gastroparesis who presents w/ hypoglycemia and AMS.    PLAN:  1. Gastroparesis:  -Patient not making much progression with PO diet which has also made glucose control difficult.   -Case discussed with Internal Medicine team.   -We will proceed with placement of PEG-J tube and TFs.  -I will work on finding a time this week to place the tube. 2. Loose Stools:  -Hold bowel regimen  -Consider Stool Cx and C diff testing if persistent or becomes febrile.        Thank you for including us in the care of this patient.  Please do not hesitate to contact us with any additional questions or concerns.     Nehemias Law MD  Gastroenterology/Hepatology  [facility]

## 2022-04-19 ENCOUNTER — ANESTHESIA (OUTPATIENT)
Dept: ENDOSCOPY | Age: 32
DRG: 420 | End: 2022-04-19
Payer: COMMERCIAL

## 2022-04-19 ENCOUNTER — ANESTHESIA EVENT (OUTPATIENT)
Dept: ENDOSCOPY | Age: 32
DRG: 420 | End: 2022-04-19
Payer: COMMERCIAL

## 2022-04-19 VITALS
DIASTOLIC BLOOD PRESSURE: 64 MMHG | OXYGEN SATURATION: 100 % | SYSTOLIC BLOOD PRESSURE: 92 MMHG | RESPIRATION RATE: 13 BRPM

## 2022-04-19 LAB
ALBUMIN SERPL-MCNC: 3.6 G/DL (ref 3.5–5.2)
ALP BLD-CCNC: 157 U/L (ref 35–104)
ALT SERPL-CCNC: 5 U/L (ref 0–32)
ANION GAP SERPL CALCULATED.3IONS-SCNC: 10 MMOL/L (ref 7–16)
AST SERPL-CCNC: 11 U/L (ref 0–31)
BILIRUB SERPL-MCNC: 0.4 MG/DL (ref 0–1.2)
BUN BLDV-MCNC: 9 MG/DL (ref 6–20)
CALCIUM IONIZED: 1.32 MMOL/L (ref 1.15–1.33)
CALCIUM SERPL-MCNC: 9.3 MG/DL (ref 8.6–10.2)
CHLORIDE BLD-SCNC: 102 MMOL/L (ref 98–107)
CO2: 27 MMOL/L (ref 22–29)
CREAT SERPL-MCNC: 3.4 MG/DL (ref 0.5–1)
GFR AFRICAN AMERICAN: 19
GFR NON-AFRICAN AMERICAN: 19 ML/MIN/1.73
GLUCOSE BLD-MCNC: 108 MG/DL (ref 74–99)
HCT VFR BLD CALC: 25.6 % (ref 34–48)
HEMOGLOBIN: 8 G/DL (ref 11.5–15.5)
MAGNESIUM: 1.9 MG/DL (ref 1.6–2.6)
MCH RBC QN AUTO: 24.8 PG (ref 26–35)
MCHC RBC AUTO-ENTMCNC: 31.3 % (ref 32–34.5)
MCV RBC AUTO: 79.3 FL (ref 80–99.9)
METER GLUCOSE: 108 MG/DL (ref 74–99)
METER GLUCOSE: 114 MG/DL (ref 74–99)
METER GLUCOSE: 116 MG/DL (ref 74–99)
METER GLUCOSE: 124 MG/DL (ref 74–99)
METER GLUCOSE: 139 MG/DL (ref 74–99)
METER GLUCOSE: 165 MG/DL (ref 74–99)
METER GLUCOSE: 82 MG/DL (ref 74–99)
PDW BLD-RTO: 21.4 FL (ref 11.5–15)
PHOSPHORUS: 2.2 MG/DL (ref 2.5–4.5)
PLATELET # BLD: 172 E9/L (ref 130–450)
PMV BLD AUTO: 9.7 FL (ref 7–12)
POTASSIUM SERPL-SCNC: 3.3 MMOL/L (ref 3.5–5)
RBC # BLD: 3.23 E12/L (ref 3.5–5.5)
SODIUM BLD-SCNC: 139 MMOL/L (ref 132–146)
TOTAL PROTEIN: 7 G/DL (ref 6.4–8.3)
WBC # BLD: 4 E9/L (ref 4.5–11.5)

## 2022-04-19 PROCEDURE — 3609013300 HC EGD TUBE PLACEMENT: Performed by: STUDENT IN AN ORGANIZED HEALTH CARE EDUCATION/TRAINING PROGRAM

## 2022-04-19 PROCEDURE — 6370000000 HC RX 637 (ALT 250 FOR IP): Performed by: INTERNAL MEDICINE

## 2022-04-19 PROCEDURE — 99233 SBSQ HOSP IP/OBS HIGH 50: CPT | Performed by: INTERNAL MEDICINE

## 2022-04-19 PROCEDURE — 2580000003 HC RX 258: Performed by: NURSE ANESTHETIST, CERTIFIED REGISTERED

## 2022-04-19 PROCEDURE — 0DH63UZ INSERTION OF FEEDING DEVICE INTO STOMACH, PERCUTANEOUS APPROACH: ICD-10-PCS | Performed by: STUDENT IN AN ORGANIZED HEALTH CARE EDUCATION/TRAINING PROGRAM

## 2022-04-19 PROCEDURE — 90935 HEMODIALYSIS ONE EVALUATION: CPT

## 2022-04-19 PROCEDURE — 3E0G76Z INTRODUCTION OF NUTRITIONAL SUBSTANCE INTO UPPER GI, VIA NATURAL OR ARTIFICIAL OPENING: ICD-10-PCS | Performed by: STUDENT IN AN ORGANIZED HEALTH CARE EDUCATION/TRAINING PROGRAM

## 2022-04-19 PROCEDURE — S5553 INSULIN LONG ACTING 5 U: HCPCS | Performed by: INTERNAL MEDICINE

## 2022-04-19 PROCEDURE — 36415 COLL VENOUS BLD VENIPUNCTURE: CPT

## 2022-04-19 PROCEDURE — 2580000003 HC RX 258: Performed by: INTERNAL MEDICINE

## 2022-04-19 PROCEDURE — C1769 GUIDE WIRE: HCPCS | Performed by: STUDENT IN AN ORGANIZED HEALTH CARE EDUCATION/TRAINING PROGRAM

## 2022-04-19 PROCEDURE — 99232 SBSQ HOSP IP/OBS MODERATE 35: CPT | Performed by: INTERNAL MEDICINE

## 2022-04-19 PROCEDURE — 6360000002 HC RX W HCPCS: Performed by: STUDENT IN AN ORGANIZED HEALTH CARE EDUCATION/TRAINING PROGRAM

## 2022-04-19 PROCEDURE — 6370000000 HC RX 637 (ALT 250 FOR IP): Performed by: NURSE PRACTITIONER

## 2022-04-19 PROCEDURE — 2500000003 HC RX 250 WO HCPCS: Performed by: STUDENT IN AN ORGANIZED HEALTH CARE EDUCATION/TRAINING PROGRAM

## 2022-04-19 PROCEDURE — 7100000000 HC PACU RECOVERY - FIRST 15 MIN: Performed by: STUDENT IN AN ORGANIZED HEALTH CARE EDUCATION/TRAINING PROGRAM

## 2022-04-19 PROCEDURE — 1200000000 HC SEMI PRIVATE

## 2022-04-19 PROCEDURE — 6360000002 HC RX W HCPCS: Performed by: NURSE ANESTHETIST, CERTIFIED REGISTERED

## 2022-04-19 PROCEDURE — 6370000000 HC RX 637 (ALT 250 FOR IP): Performed by: STUDENT IN AN ORGANIZED HEALTH CARE EDUCATION/TRAINING PROGRAM

## 2022-04-19 PROCEDURE — 3700000000 HC ANESTHESIA ATTENDED CARE: Performed by: STUDENT IN AN ORGANIZED HEALTH CARE EDUCATION/TRAINING PROGRAM

## 2022-04-19 PROCEDURE — 84100 ASSAY OF PHOSPHORUS: CPT

## 2022-04-19 PROCEDURE — 82330 ASSAY OF CALCIUM: CPT

## 2022-04-19 PROCEDURE — 2580000003 HC RX 258: Performed by: STUDENT IN AN ORGANIZED HEALTH CARE EDUCATION/TRAINING PROGRAM

## 2022-04-19 PROCEDURE — 80053 COMPREHEN METABOLIC PANEL: CPT

## 2022-04-19 PROCEDURE — 6360000002 HC RX W HCPCS: Performed by: INTERNAL MEDICINE

## 2022-04-19 PROCEDURE — 3700000001 HC ADD 15 MINUTES (ANESTHESIA): Performed by: STUDENT IN AN ORGANIZED HEALTH CARE EDUCATION/TRAINING PROGRAM

## 2022-04-19 PROCEDURE — 0DHA3UZ INSERTION OF FEEDING DEVICE INTO JEJUNUM, PERCUTANEOUS APPROACH: ICD-10-PCS | Performed by: STUDENT IN AN ORGANIZED HEALTH CARE EDUCATION/TRAINING PROGRAM

## 2022-04-19 PROCEDURE — 85027 COMPLETE CBC AUTOMATED: CPT

## 2022-04-19 PROCEDURE — 82962 GLUCOSE BLOOD TEST: CPT

## 2022-04-19 PROCEDURE — 94660 CPAP INITIATION&MGMT: CPT

## 2022-04-19 PROCEDURE — 83735 ASSAY OF MAGNESIUM: CPT

## 2022-04-19 PROCEDURE — 2709999900 HC NON-CHARGEABLE SUPPLY: Performed by: STUDENT IN AN ORGANIZED HEALTH CARE EDUCATION/TRAINING PROGRAM

## 2022-04-19 PROCEDURE — 7100000001 HC PACU RECOVERY - ADDTL 15 MIN: Performed by: STUDENT IN AN ORGANIZED HEALTH CARE EDUCATION/TRAINING PROGRAM

## 2022-04-19 RX ORDER — POTASSIUM CHLORIDE 7.45 MG/ML
10 INJECTION INTRAVENOUS
Status: DISPENSED | OUTPATIENT
Start: 2022-04-19 | End: 2022-04-19

## 2022-04-19 RX ORDER — DEXTROSE MONOHYDRATE 25 G/50ML
25 INJECTION, SOLUTION INTRAVENOUS ONCE
Status: COMPLETED | OUTPATIENT
Start: 2022-04-19 | End: 2022-04-19

## 2022-04-19 RX ORDER — PHENYLEPHRINE HYDROCHLORIDE 10 MG/ML
INJECTION INTRAVENOUS PRN
Status: DISCONTINUED | OUTPATIENT
Start: 2022-04-19 | End: 2022-04-19 | Stop reason: SDUPTHER

## 2022-04-19 RX ORDER — POTASSIUM CHLORIDE 7.45 MG/ML
10 INJECTION INTRAVENOUS
Status: COMPLETED | OUTPATIENT
Start: 2022-04-19 | End: 2022-04-19

## 2022-04-19 RX ORDER — PROPOFOL 10 MG/ML
INJECTION, EMULSION INTRAVENOUS PRN
Status: DISCONTINUED | OUTPATIENT
Start: 2022-04-19 | End: 2022-04-19 | Stop reason: SDUPTHER

## 2022-04-19 RX ORDER — MAGNESIUM SULFATE 1 G/100ML
1000 INJECTION INTRAVENOUS ONCE
Status: DISCONTINUED | OUTPATIENT
Start: 2022-04-19 | End: 2022-04-19

## 2022-04-19 RX ORDER — SODIUM CHLORIDE 0.9 % (FLUSH) 0.9 %
5-40 SYRINGE (ML) INJECTION EVERY 12 HOURS SCHEDULED
Status: DISCONTINUED | OUTPATIENT
Start: 2022-04-19 | End: 2022-04-25 | Stop reason: HOSPADM

## 2022-04-19 RX ORDER — SODIUM CHLORIDE 9 MG/ML
INJECTION, SOLUTION INTRAVENOUS CONTINUOUS PRN
Status: DISCONTINUED | OUTPATIENT
Start: 2022-04-19 | End: 2022-04-19 | Stop reason: SDUPTHER

## 2022-04-19 RX ORDER — DEXTROSE MONOHYDRATE 25 G/50ML
25 INJECTION, SOLUTION INTRAVENOUS ONCE
Status: DISCONTINUED | OUTPATIENT
Start: 2022-04-19 | End: 2022-04-19 | Stop reason: SDUPTHER

## 2022-04-19 RX ORDER — LIDOCAINE HYDROCHLORIDE 10 MG/ML
INJECTION, SOLUTION INFILTRATION; PERINEURAL PRN
Status: DISCONTINUED | OUTPATIENT
Start: 2022-04-19 | End: 2022-04-19 | Stop reason: ALTCHOICE

## 2022-04-19 RX ORDER — SODIUM CHLORIDE 9 MG/ML
25 INJECTION, SOLUTION INTRAVENOUS PRN
Status: DISCONTINUED | OUTPATIENT
Start: 2022-04-19 | End: 2022-04-25 | Stop reason: HOSPADM

## 2022-04-19 RX ORDER — SODIUM CHLORIDE 0.9 % (FLUSH) 0.9 %
5-40 SYRINGE (ML) INJECTION PRN
Status: DISCONTINUED | OUTPATIENT
Start: 2022-04-19 | End: 2022-04-25 | Stop reason: HOSPADM

## 2022-04-19 RX ADMIN — PHENYLEPHRINE HYDROCHLORIDE 100 MCG: 10 INJECTION, SOLUTION INTRAVENOUS at 15:26

## 2022-04-19 RX ADMIN — DIPHENHYDRAMINE HYDROCHLORIDE 25 MG: 50 INJECTION, SOLUTION INTRAMUSCULAR; INTRAVENOUS at 13:12

## 2022-04-19 RX ADMIN — PROPOFOL 430 MG: 10 INJECTION, EMULSION INTRAVENOUS at 14:39

## 2022-04-19 RX ADMIN — LEVOTHYROXINE SODIUM 88 MCG: 0.09 TABLET ORAL at 05:38

## 2022-04-19 RX ADMIN — SODIUM CHLORIDE 3000 MG: 900 INJECTION INTRAVENOUS at 14:37

## 2022-04-19 RX ADMIN — PHENYLEPHRINE HYDROCHLORIDE 100 MCG: 10 INJECTION, SOLUTION INTRAVENOUS at 15:11

## 2022-04-19 RX ADMIN — VASOPRESSIN: 20 INJECTION, SOLUTION INTRAVENOUS at 12:38

## 2022-04-19 RX ADMIN — CARVEDILOL 12.5 MG: 6.25 TABLET, FILM COATED ORAL at 12:36

## 2022-04-19 RX ADMIN — POTASSIUM CHLORIDE 10 MEQ: 10 INJECTION, SOLUTION INTRAVENOUS at 17:17

## 2022-04-19 RX ADMIN — INSULIN GLARGINE-YFGN 1 UNITS: 100 INJECTION, SOLUTION SUBCUTANEOUS at 21:10

## 2022-04-19 RX ADMIN — PHENYLEPHRINE HYDROCHLORIDE 50 MCG: 10 INJECTION, SOLUTION INTRAVENOUS at 14:56

## 2022-04-19 RX ADMIN — PHENYLEPHRINE HYDROCHLORIDE 100 MCG: 10 INJECTION, SOLUTION INTRAVENOUS at 15:29

## 2022-04-19 RX ADMIN — Medication 10 ML: at 21:10

## 2022-04-19 RX ADMIN — CARVEDILOL 12.5 MG: 6.25 TABLET, FILM COATED ORAL at 21:09

## 2022-04-19 RX ADMIN — SODIUM CHLORIDE: 9 INJECTION, SOLUTION INTRAVENOUS at 14:19

## 2022-04-19 RX ADMIN — Medication 1000 UNITS: at 12:37

## 2022-04-19 RX ADMIN — PHENYLEPHRINE HYDROCHLORIDE 50 MCG: 10 INJECTION, SOLUTION INTRAVENOUS at 14:49

## 2022-04-19 RX ADMIN — ROPINIROLE 0.25 MG: 0.25 TABLET, FILM COATED ORAL at 21:09

## 2022-04-19 RX ADMIN — ACETAMINOPHEN 650 MG: 325 TABLET ORAL at 12:36

## 2022-04-19 RX ADMIN — POTASSIUM CHLORIDE 10 MEQ: 10 INJECTION, SOLUTION INTRAVENOUS at 12:57

## 2022-04-19 RX ADMIN — Medication 6 MG: at 21:09

## 2022-04-19 RX ADMIN — ARIPIPRAZOLE 5 MG: 5 TABLET ORAL at 21:09

## 2022-04-19 RX ADMIN — ACETAMINOPHEN 650 MG: 325 TABLET ORAL at 23:41

## 2022-04-19 RX ADMIN — Medication 10 ML: at 12:38

## 2022-04-19 RX ADMIN — DEXTROSE MONOHYDRATE 25 G: 25 INJECTION, SOLUTION INTRAVENOUS at 14:01

## 2022-04-19 RX ADMIN — AMPICILLIN SODIUM AND SULBACTAM SODIUM 3000 MG: 2; 1 INJECTION, POWDER, FOR SOLUTION INTRAMUSCULAR; INTRAVENOUS at 05:43

## 2022-04-19 RX ADMIN — MIRTAZAPINE 15 MG: 15 TABLET, FILM COATED ORAL at 21:09

## 2022-04-19 RX ADMIN — SODIUM CHLORIDE, PRESERVATIVE FREE 100 UNITS: 5 INJECTION INTRAVENOUS at 12:37

## 2022-04-19 RX ADMIN — PHENYLEPHRINE HYDROCHLORIDE 100 MCG: 10 INJECTION, SOLUTION INTRAVENOUS at 15:17

## 2022-04-19 RX ADMIN — ESCITALOPRAM 10 MG: 10 TABLET, FILM COATED ORAL at 12:36

## 2022-04-19 RX ADMIN — HYDROMORPHONE HYDROCHLORIDE 0.5 MG: 1 INJECTION, SOLUTION INTRAMUSCULAR; INTRAVENOUS; SUBCUTANEOUS at 21:07

## 2022-04-19 RX ADMIN — PROMETHAZINE HYDROCHLORIDE 25 MG: 25 TABLET ORAL at 12:36

## 2022-04-19 RX ADMIN — PROMETHAZINE HYDROCHLORIDE 25 MG: 25 TABLET ORAL at 05:38

## 2022-04-19 RX ADMIN — ACETAMINOPHEN 650 MG: 325 TABLET ORAL at 17:16

## 2022-04-19 RX ADMIN — SODIUM CHLORIDE, PRESERVATIVE FREE 100 UNITS: 5 INJECTION INTRAVENOUS at 21:09

## 2022-04-19 RX ADMIN — PANTOPRAZOLE SODIUM 40 MG: 40 TABLET, DELAYED RELEASE ORAL at 17:16

## 2022-04-19 RX ADMIN — POTASSIUM CHLORIDE 10 MEQ: 10 INJECTION, SOLUTION INTRAVENOUS at 18:21

## 2022-04-19 RX ADMIN — PROMETHAZINE HYDROCHLORIDE 25 MG: 25 TABLET ORAL at 17:16

## 2022-04-19 RX ADMIN — INSULIN LISPRO 1 UNITS: 100 INJECTION, SOLUTION INTRAVENOUS; SUBCUTANEOUS at 14:03

## 2022-04-19 RX ADMIN — PANTOPRAZOLE SODIUM 40 MG: 40 TABLET, DELAYED RELEASE ORAL at 05:37

## 2022-04-19 ASSESSMENT — PAIN SCALES - GENERAL
PAINLEVEL_OUTOF10: 10
PAINLEVEL_OUTOF10: 3
PAINLEVEL_OUTOF10: 6
PAINLEVEL_OUTOF10: 5
PAINLEVEL_OUTOF10: 9

## 2022-04-19 ASSESSMENT — PAIN DESCRIPTION - PAIN TYPE: TYPE: ACUTE PAIN

## 2022-04-19 ASSESSMENT — PAIN DESCRIPTION - LOCATION: LOCATION: ABDOMEN

## 2022-04-19 NOTE — PROGRESS NOTES
Date: 4/18/2022    Time: 9:43 PM    Patient Placed On BIPAP/CPAP/ Non-Invasive Ventilation? Yes    If no must comment. Facial area red/color change? No           If YES are Blister/Lesion present? No   If yes must notify nursing staff  BIPAP/CPAP skin barrier?   Yes    Skin barrier type:mepilexlite       Comments:        Laina Damon RCP

## 2022-04-19 NOTE — ANESTHESIA PRE PROCEDURE
Department of Anesthesiology  Preprocedure Note       Name:  Bebeto Reece   Age:  36 y.o.  :  1990                                          MRN:  [id number]         Date:  2022      Surgeon: Dayna Walsh):  Lee Tirado MD    Procedure: Procedure(s):  EGD PEG TUBE PLACEMENT    Medications prior to admission:   Prior to Admission medications    Medication Sig Start Date End Date Taking?  Authorizing Provider   white petrolatum OINT ointment Apply topically 2 times daily as needed (dry, itching skin) 4/15/22  Yes Valeria Stern MD   promethazine (PHENERGAN) 25 MG tablet Take 1 tablet by mouth every 6 hours as needed for Nausea 4/15/22  Yes Valeria Stern MD   ondansetron (ZOFRAN) 4 MG tablet Take 1 tablet by mouth every 8 hours as needed for Nausea or Vomiting 4/15/22  Yes Valeria Stern MD   carvedilol (COREG) 6.25 MG tablet Take 1 tablet by mouth 2 times daily 4/15/22  Yes Valeria Stern MD   levothyroxine (SYNTHROID) 88 MCG tablet Take 1 tablet by mouth Daily 22  Yes Valeria Stern MD   pantoprazole (PROTONIX) 40 MG tablet Take 1 tablet by mouth 2 times daily (before meals) 4/15/22  Yes Valeria Stern MD   vitamin D (ERGOCALCIFEROL) 1.25 MG (56843 UT) CAPS capsule Take 1 capsule by mouth once a week 4/15/22  Yes Valeria Stern MD   glucose [facility] GLUCOSE) 4 g chewable tablet Take 4 tablets by mouth as needed for Low blood sugar 4/15/22  Yes Valeria Stern MD   insulin lispro (HUMALOG) 100 UNIT/ML injection vial Inject 0-3 Units into the skin 3 times daily (with meals) 4/15/22  Yes Valeria Stern MD   insulin glargine (LANTUS SOLOSTAR) 100 UNIT/ML injection pen Inject 1 Units into the skin daily Please do not give for blood sugars less than 180 4/15/22  Yes Valeria Stern MD   bisacodyl (DULCOLAX) 5 MG EC tablet Take 1 tablet by mouth daily 22  Yes Valeria Stern MD   escitalopram (LEXAPRO) 10 MG tablet Take 1 tablet by mouth daily 3/5/22   Mark Zamarripa PA-C   Glucagon 3 MG/DOSE POWD by Nasal route See Admin Instructions    Historical Provider, MD   hydrOXYzine (ATARAX) 25 MG tablet Take 25 mg by mouth every 8 hours as needed for Itching    Historical Provider, MD   epoetin nena-epbx (RETACRIT) 64896 UNIT/ML SOLN injection Inject 0.5 mLs into the skin three times a week 2/4/22   Evin Lopez DO   pregabalin (LYRICA) 50 MG capsule Take 1 capsule by mouth 3 times daily for 30 days. Patient taking differently: Take 50 mg by mouth 2 times daily.   2/3/22 3/5/22  Shraddha Lopez DO   sevelamer (RENVELA) 800 MG tablet Take 2 tablets by mouth 3 times daily (with meals)    Historical Provider, MD   cholestyramine (QUESTRAN) 4 g packet Take 1 packet by mouth 2 times daily 1/24/22   Sudha Mcnamara MD   albuterol sulfate  (90 Base) MCG/ACT inhaler Inhale 4 puffs into the lungs as needed for Wheezing or Shortness of Breath 1/5/22   Dikc Mott DO   mirtazapine (REMERON) 15 MG tablet Take 15 mg by mouth nightly    Historical Provider, MD   ARIPiprazole (ABILIFY) 5 MG tablet Take 5 mg by mouth nightly 4/18/21   Historical Provider, MD   rOPINIRole (REQUIP) 0.25 MG tablet Take 0.25 mg by mouth nightly    Historical Provider, MD       Current medications:    Current Facility-Administered Medications   Medication Dose Route Frequency Provider Last Rate Last Admin    sodium chloride flush 0.9 % injection 5-40 mL  5-40 mL IntraVENous 2 times per day María Parker MD   10 mL at 04/19/22 1238    sodium chloride flush 0.9 % injection 5-40 mL  5-40 mL IntraVENous PRN María Parker MD        0.9 % sodium chloride infusion  25 mL IntraVENous PRN María Parker MD        sodium chloride 150 mEq in dextrose 10 % 1,000 mL infusion   IntraVENous Continuous Jared Zee MD 60 mL/hr at 04/19/22 1242 Associate Infusion Device at 04/19/22 1242    potassium chloride 10 mEq/100 mL IVPB (Peripheral Line)  10 mEq IntraVENous Q1H Jared Zee  mL/hr at 04/19/22 1257 10 mEq at 04/19/22 1257  dextrose 50 % IV solution  25 g IntraVENous Once Tammie Chau MD        And    insulin lispro (HUMALOG) injection vial 1 Units  1 Units SubCUTAneous Once Tammie Chau MD        [Held by provider] metoclopramide (REGLAN) tablet 5 mg  5 mg Oral TID AC Tammie Chau MD        benzocaine (ORAJEL) 20 % mucosal gel   Mouth/Throat BID PRN Tammie Chau MD        insulin glargine-yfgn [facility]) injection vial 1 Units  1 Units SubCUTAneous Nightly Willie Ortiz MD   1 Units at 04/18/22 1553    labetalol (NORMODYNE;TRANDATE) injection 10 mg  10 mg IntraVENous Once Laz Scruggs MD        insulin lispro (HUMALOG) injection vial 0-3 Units  0-3 Units SubCUTAneous 4x Daily AC & HS Willie Ortiz MD   1 Units at 04/17/22 0827    carvedilol (COREG) tablet 12.5 mg  12.5 mg Oral BID [facility] Isabella Cantor MD   12.5 mg at 04/19/22 1236    [START ON 4/20/2022] erythromycin (ERYTHROCIN) 500 mg in sodium chloride 0.9% 250 mL IVPB  500 mg IntraVENous Once per day on Tue Thu Sat Martha Molina MD        [Held by provider] labetalol (NORMODYNE;TRANDATE) injection 5 mg  5 mg IntraVENous Q6H PRN Dahlia Ang MD   5 mg at 04/17/22 0410    [Held by provider] bisacodyl (DULCOLAX) EC tablet 5 mg  5 mg Oral Daily Tammie Chau MD   5 mg at 04/15/22 1302    pantoprazole (PROTONIX) tablet 40 mg  40 mg Oral BID AC Laz Scruggs MD   40 mg at 04/19/22 0537    glucose chewable tablet 4 each  4 each Oral PRN Ksenia Gunter DO   4 each at 04/14/22 1410    diphenhydrAMINE (BENADRYL) injection 25 mg  25 mg IntraVENous Q6H PRN Ollie Fong MD   25 mg at 04/19/22 1312    magic (miracle) mouthwash  5 mL Swish & Spit 4x Daily PRN Daquan Minaya MD   5 mL at 04/18/22 1415    promethazine (PHENERGAN) tablet 25 mg  25 mg Oral TID AC Laz Scruggs MD   25 mg at 04/19/22 1236    acetaminophen (TYLENOL) tablet 650 mg  650 mg Oral Q6H Tammie Chau MD   650 mg at 04/19/22 1236    Or    acetaminophen (TYLENOL) suppository 650 mg  650 mg Rectal Q6H Adriano Cervantes MD   650 mg at 04/15/22 1620    ondansetron (ZOFRAN) injection 4 mg  4 mg IntraVENous Q4H PRN Jefe Done Jr., DO   4 mg at 04/17/22 1950    trimethobenzamide (TIGAN) injection 200 mg  200 mg IntraMUSCular Q6H PRN Jeef Done Jr., DO   200 mg at 04/17/22 2107    melatonin tablet 6 mg  6 mg Oral Nightly Jacinda L Littlejohn, DO   6 mg at 04/18/22 2108    loperamide (IMODIUM) capsule 2 mg  2 mg Oral 4x Daily PRN Chico Stein MD   2 mg at 04/18/22 2107    sodium chloride flush 0.9 % injection 5-40 mL  5-40 mL IntraVENous 2 times per day Chance Burleson MD   20 mL at 04/18/22 2227    sodium chloride flush 0.9 % injection 5-40 mL  5-40 mL IntraVENous PRN Chance Burleson MD   10 mL at 04/16/22 1812    0.9 % sodium chloride infusion   IntraVENous PRN Chance Burleson MD        heparin flush 100 UNIT/ML injection 100 Units  1 mL IntraVENous 2 times per day Chance Burleson MD   100 Units at 04/19/22 1237    heparin flush 100 UNIT/ML injection 100 Units  1 mL IntraCATHeter PRN Chance Burleson MD        white petrolatum ointment   Topical BID PRN Chance Burleson MD        mirtazapine (REMERON) tablet 15 mg  15 mg Oral Nightly Jacinda L Littlejohn, DO   15 mg at 04/18/22 2108    escitalopram (LEXAPRO) tablet 10 mg  10 mg Oral Daily Jacinda L Littlejohn, DO   10 mg at 04/19/22 1236    ARIPiprazole (ABILIFY) tablet 5 mg  5 mg Oral Nightly Jacinda L Littlejohn, DO   5 mg at 04/18/22 2108    rOPINIRole (REQUIP) tablet 0.25 mg  0.25 mg Oral Nightly Jacinda L Littlejohn, DO   0.25 mg at 04/18/22 2108    polyethylene glycol (GLYCOLAX) packet 17 g  17 g Oral Daily PRN Jacinda L Littlejohn, DO        dextrose 50 % IV solution  12.5 g IntraVENous PRN Jacinda L Annetta, DO   12.5 g at 04/17/22 2110    glucagon (rDNA) injection 1 mg  1 mg IntraMUSCular PRN Jacindayael Littlejohn, DO   1 mg at 04/15/22 1548    dextrose 5 % solution  100 mL/hr IntraVENous PRN Derrell Herrera,    Stopped at 04/12/22 2307    albuterol (PROVENTIL) nebulizer solution 2.5 mg  2.5 mg Nebulization Q6H PRN Lashanda Aggarwal MD        levothyroxine (SYNTHROID) tablet 88 mcg  88 mcg Oral Daily Loco Best MD   88 mcg at 04/19/22 0538    epoetin nena-epbx (RETACRIT) injection 3,000 Units  3,000 Units SubCUTAneous Once per day on Mon Wed Fri HEBER Berg - CNP   3,000 Units at 04/18/22 1362    Vitamin D (CHOLECALCIFEROL) tablet 1,000 Units  1,000 Units Per NG tube Daily HEBER Berg - CNP   1,000 Units at 04/19/22 1237       Allergies: Allergies   Allergen Reactions    Cefepime Other (See Comments)     \" neurological side effect- slurred speech and confusion    Toradol [Ketorolac Tromethamine] Anaphylaxis and Hives       Problem List:    Patient Active Problem List   Diagnosis Code    Non compliance w medication regimen Z91.14    Tobacco smoking complicating pregnancy G37.088    MTHFR mutation Z15.89    Generalized abdominal pain R10.84    Hypoglycemia E16.2    Hypertension I10    Prolonged QT interval R94.31    Iron deficiency anemia D50.9    Sinus tachycardia R00.0    Bladder dysfunction N31.9    Clostridium difficile infection A49.8    Severe protein-calorie malnutrition (HCC) E43    Uncontrolled type 1 diabetes mellitus with hyperglycemia (HCC) E10.65    End stage renal disease (HCC) N18.6    Hypothyroidism E03.9    Hyperlipidemia E78.5    Acute cystitis N30.00    Nondisplaced fracture of neck of fifth metacarpal bone, left hand, initial encounter for closed fracture S62.367A    Chronic right-sided low back pain M54.50, G89.29    Endocarditis I38    Seizure (HCC) R56.9    Hyperkalemia E87.5    Hypomagnesemia E83.42    Hypocalcemia E83.51    Intractable nausea and vomiting R11.2    Wound of left leg, sequela S81.802S    Syncope R55    Type 1 diabetes mellitus without complication ([facility]) B33.7    Hyperosmolality E87.0    Depression F32. A    Lactic acid acidosis E87.2    Early satiety R68.81    Acute on chronic systolic CHF (congestive heart failure) ([facility]) I50.23    Other chest pain R07.89    Anemia due to chronic kidney disease, on chronic dialysis ([facility]) N18.6, D63.1, Z99.2    Septic shock ([facility]) A41.9, R65.21    ESRD (end stage renal disease) ([facility]) N18.6    Hyperosmolar hyperglycemic state (HHS) ([facility]) E11.00, E11.65    Hypertensive urgency I16.0    Nausea R11.0    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    ESRD (end stage renal disease) on dialysis ([facility]) N18.6, Z99.2    AMS (altered mental status) R41.82    Opioid overdose ([address] T40.2X1A    Anemia D64.9    Type 1 diabetes mellitus with chronic kidney disease on chronic dialysis ([facility]) E10.22, N18.6, Z99.2    Elevated TSH R79.89    Diabetic acidosis without coma ([facility]) E11.10    DKA, type 1, not at goal ([address] E10.10    COVID-19 virus infection U07.1    Frequent hospital admissions Z78.9    Gastroparesis K31.84    Diffuse pain R52    Poor prognosis Z78.9    Physical deconditioning R53.81    Declining functional status R53.81    Lack of motivation Z91.89    Drug-seeking behavior Z76.5    Behavior problem, adult F69    Volume overload E87.70    Hyperglycemia R73.9    Cognitive communication disorder R41.841       Past Medical History:        Diagnosis Date    Acute congestive heart failure ([address]     BC (acute kidney injury) ([address] 10/01/2019    Cardiac arrest ([address] 02/15/2021    Cephalgia 10/09/2019    Chronic kidney disease     Depression     Diabetes mellitus ([address]     Diabetic gastroparesis associated with type 1 diabetes mellitus ([address] 12/17/2018    Diabetic ketoacidosis ([address] 08/27/2011    Diabetic ketoacidosis with coma associated with type 1 diabetes mellitus ([address] 06/26/2013    Diabetic polyneuropathy associated with type 1 diabetes mellitus ([address] 12/27/2020    Drug use complicating pregnancy in third trimester     Endocarditis 10/31/2020    [facility] stage [facility] disease) [facility] 2020    H/O cardiovascular stress test 2021    Lexiscan    Hemod[facility] patient [facility])     History of blood transfusion 2019    Hyperlipidemia 10/08/2020    Hyperosmolar hyperglycemic state (HHS) [facility] 2020    Hypothyroidism 10/08/2020    Iron deficiency anemia 10/01/2019    MDRO (multiple drug resistant organisms) resistance     MRSA (methicillin resistant Staphylococcus aureus)     back wound abcess    Non compliance w medication regimen 2016    Other disorders of kidney and ureter     Pregnancy 2016    16 weeks    Previous  delivery affecting pregnancy, antepartum 2017    Previous stillbirth or demise, antepartum 2016    Seizure ([facility] 2020    Severe pre-eclampsia in third trimester 2016    Shock liver 02/15/2021    Valvular endocarditis 11/10/2020    This Diagnosis was added to the Problem List based on transcribed orders from Dr. Geremias Klein          Past Surgical History:        Procedure Laterality Date    BACK SURGERY      abscess    CATHETER REMOVAL N/A 10/1/2021    PERITONEAL CATHETER REMOVAL performed by Sierra Gibson MD at [facility]      x2   [facility], LAPAROSCOPIC N/A 2019    CHOLECYSTECTOMY LAPAROSCOPIC performed by Demarcus Dietrich MD at [address] [facility] N/A 2018    COLONOSCOPY WITH BIOPSY performed by Karen Barboza MD at [facility] N/A 2018    COLONOSCOPY WITH BIOPSY performed by Osmel Bryan MD at [address] [place] [address] ECHO COMPL [address]  2012    EF 57%    ECHO COMPL W DOP COLOR FLOW  6/10/2013         EMBOLECTOMY N/A 2020    ANGIOVAC, VEGECTOMY, YULISSA -- REQS ROOM 3 performed by Roddy Lindsay MD at [address] [place] [address] Left 2021    LEFT LEG INCISION AND DRAINAGE, DEBRIDEMENT, WOUND VAC APPLICATION performed by Laura Gonzalez DPM at [address] [place] [address] Left 2021    LEFT LEG DEBRIDEMENT BIOPSY POSS APPLICATION WOUND VAC performed by Derek Fish DPM at [facility] N/A 2020    LAPAROSCOPIC INSERTION PERITONEAL DIALYSIS CATHETER performed by Allan Hunt MD at [address] [facility] ESOPHAGOGASTRODUODENOSCOPY TRANSORAL DIAGNOSTIC N/A 2018    EGD ESOPHAGOGASTRODUODENOSCOPY performed by Lukas Gama MD at [address] TRANSESOPHAGEAL ECHOCARDIOGRAM N/A 10/19/2020    TRANSESOPHAGEAL ECHOCARDIOGRAM WITH BUBBLE STUDY performed by Darby Perez MD at [address] [facility] [address] [address]  2018    UPPER GASTROINTESTINAL ENDOSCOPY  2018    EGD BIOPSY performed by Jimbo Mayes MD at [address] [facility] N/A 10/11/2019    EGD ESOPHAGOGASTRODUODENOSCOPY performed by Syed Burrows DO at [address] [facility] N/A 2021    EGD BIOPSY performed by Saturnino Huang MD at [address] History:    Social History     Tobacco Use    Smoking status: Former Smoker     Packs/day: 0.50     Years: 6.00     Pack years: 3.00     Types: Cigarettes     Quit date: 2021     Years since quittin.2    Smokeless tobacco: Never Used    Tobacco comment: vaper cig   Substance Use Topics    Alcohol use:  No                                Counseling given: Not Answered  Comment: vaper cig      Vital Signs (Current):   Vitals:    22 1000 22 1030 22 1106 22 1215   BP: 126/77 134/88 (!) 142/87 (!) 190/109   Pulse: 79 79 79 85   Resp:   16 16   Temp:    36.3 °C (97.3 °F)   TempSrc:    Oral   SpO2:    98%   Weight:       Height:                                                  BP Readings from Last 3 Encounters:   22 (!) 190/109   22 (!) 132/97   22 110/70       NPO Status:                                                                                 BMI:   Wt Readings from Last 3 Encounters:   22 133 lb 9.6 oz (60.6 kg) 03/03/22 129 lb 10.1 oz (58.8 kg)   02/03/22 141 lb 12.1 oz (64.3 kg)     Body mass index is 22.93 kg/m². CBC:   Lab Results   Component Value Date    WBC 4.0 04/19/2022    RBC 3.23 04/19/2022    HGB 8.0 04/19/2022    HCT 25.6 04/19/2022    MCV 79.3 04/19/2022    RDW 21.4 04/19/2022     04/19/2022       CMP:   Lab Results   Component Value Date     04/19/2022    K 3.3 04/19/2022    K 4.0 04/14/2022     04/19/2022    CO2 27 04/19/2022    BUN 9 04/19/2022    CREATININE 3.4 04/19/2022    GFRAA 19 04/19/2022    LABGLOM 19 04/19/2022    GLUCOSE 108 04/19/2022    GLUCOSE 130 05/18/2012    PROT 7.0 04/19/2022    CALCIUM 9.3 04/19/2022    BILITOT 0.4 04/19/2022    ALKPHOS 157 04/19/2022    AST 11 04/19/2022    ALT 5 04/19/2022       POC Tests: No results for input(s): POCGLU, POCNA, POCK, POCCL, POCBUN, POCHEMO, POCHCT in the last 72 hours.     Coags:   Lab Results   Component Value Date    PROTIME 12.1 03/31/2022    PROTIME 13.6 08/14/2011    INR 1.1 03/31/2022    APTT 33.3 03/31/2022       HCG (If Applicable):   Lab Results   Component Value Date    PREGTESTUR NEGATIVE 03/27/2022    HCG 47222.4 (H) 06/26/2013        ABGs:   Lab Results   Component Value Date    PHART 7.345 07/20/2012    PO2ART 91.5 10/29/2019    KCN0XFU 35.0 10/29/2019    BDO2WEV 14.0 02/15/2021    X7BZLRTL 97.7 09/08/2012        Type & Screen (If Applicable):  Lab Results   Component Value Date    LABABO O 12/29/2011    [address] POSITIVE 12/29/2011       Drug/Infectious Status (If Applicable):  No results found for: HIV, HEPCAB    COVID-19 Screening (If Applicable):   Lab Results   Component Value Date    COVID19 Not Detected 03/28/2022           Anesthesia Evaluation  Patient summary reviewed  Airway: Mallampati: Unable to assess / NA        Dental:          Pulmonary: breath sounds clear to auscultation  (+) COPD:                             Cardiovascular:    (+) hypertension:, valvular problems/murmurs (valvular endocarditis):, CHF (acute congestive heart failure):, hyperlipidemia      ECG reviewed  Rhythm: regular  Rate: normal  Echocardiogram reviewed               ROS comment: Cardiac arrest 2/15/2021    EKG: Sinus tachycardia 106. Nonspecific T wave abnormality  Abnormal ECG  When compared with ECG of 01-FEB-2022 08:48,  Nonspecific T wave abnormality now evident in Lateral leads  Confirmed by Mario Frias ([id number]) on 2/3/2022 4:15:24 PM    ECHO:   Limited Echo for LV function. Normal left ventricular chamber size and systolic function, LVEF is 60-65%. Normal right ventricle size and function. Normal aortic root size. No evidence of pericardial effusion. No intra cardiac mass or thrombus. Compared to prior echo from 2/22/21, no significant changes noted. Neuro/Psych:   (+) seizures:, neuromuscular disease (diabetic polyneuropathy):, headaches:, depression/anxiety    (-) psychiatric history           GI/Hepatic/Renal:   (+) liver disease (shock liver):, renal disease: ESRD and dialysis,           Endo/Other:    (+) Diabetes (gastroparesis, diabetic ketoacidosis coma)Type I DM, poorly controlled, using insulin, hypothyroidism, blood dyscrasia (iron deficient): anemia:., .                 Abdominal:             Vascular: Other Findings:             Anesthesia Plan      MAC     ASA 4       Induction: intravenous.   continuous noninvasive hemodynamic monitor                        HEBER Donovan - TROY   4/19/2022

## 2022-04-19 NOTE — FLOWSHEET NOTE
04/19/22 1106   Vital Signs   BP (!) 142/87   Pulse 79   Resp 16   Weight   (scale not working)   Post-Hemodialysis Assessment   Post-Treatment Procedures Blood returned;Catheter capped, clamped and heparinized x 2 ports   Rinseback Volume (ml) 300 ml   Dialyzer Clearance Clear   Duration of Treatment (minutes) 195 minutes   NET Removed (ml) 1300 ml   Tolerated Treatment Good   Patient Response to Treatment pt tolerated tx well. pt rinsedback with 300cc nss. pt cath flushed,heparinized and capped x2. pt taken back to her room.

## 2022-04-19 NOTE — PROGRESS NOTES
PEG-J tube inserted per Dr Rhianna Cuevas without difficulty. PEG 3mm @ skin. DSD  Applied and binder on.

## 2022-04-19 NOTE — CARE COORDINATION
Pt is off floor for Peg Tube placement. Discharge Plan is to return home once tube feeding is tolerated and medically stable. SW/CM to follow for discharge needs. Mother [facility] declined [facility] Tim d/t her being a RN.    Jaylin William, L.S.W.  [phone]

## 2022-04-19 NOTE — CARE COORDINATION
Received a call from [address] [place] [address] ([phone]). Updated on discharge plan and condition. Shirley Covarrubias requested a call when Pt is being discharged. Discharge Plan is to return home when medically stable. [facility] an [facility]. MINISTERIO/MICHEL to follow for discharge needs.   Jatin Brenner, L.S.W.  [phone]

## 2022-04-19 NOTE — BRIEF OP NOTE
Brief Postoperative Note      Patient: Radha Rowland  YOB: 1990  MRN: [id number]    Date of Procedure: 4/19/2022    Pre-Op Diagnosis: Gastroparesis, Malnutrition    Post-Op Diagnosis: Same       Procedure(s):  EGD PEG TUBE PLACEMENT    Surgeon(s):  Lyubov Frye MD    Assistant:  * No surgical staff found *    Anesthesia: Monitor Anesthesia Care    Estimated Blood Loss (mL): Minimal    Complications: None    Specimens:   * No specimens in log *    Implants:  * No implants in log *      Drains:   Gastrostomy/Enterostomy/Jejunostomy PEG-Jejunostomy LUQ 1 (Active)       [REMOVED] NG/OG/NJ/NE Tube Orogastric 14 fr Right mouth (Removed)   Surrounding Skin Intact;Dry 03/30/22 0500   Securement device Yes 03/30/22 0500   Status Clamped 03/30/22 0500   Placement Verified by External Catheter Length 03/30/22 0500   NG/OG/NJ/NE External Measurement (cm) 53 cm 03/30/22 0500   Drainage Appearance Bile 03/28/22 2000   Tube Feeding Supplement Amount (mL) 0 03/29/22 0529   Tube Feeding Intake (mL) 0 ml 03/29/22 0529   Free Water Flush (mL) 120 mL 03/29/22 0529   Free Water Rate 0 03/29/22 0529   Output (mL) 0 ml 03/29/22 0529       [REMOVED] Urethral Catheter Straight-tip 16 fr (Removed)   Catheter Indications Need for fluid volume management of the critically ill patient in a critical care setting 03/29/22 0401   Site Assessment No urethral drainage 03/29/22 0401   Urine Color Yellow 03/29/22 0401   Urine Appearance Cloudy 03/29/22 0401   Output (mL) 5 mL 03/28/22 2222       [REMOVED] External Urinary Catheter (Removed)   Output (mL) 5 mL 03/29/22 0529       Findings:   Reactive gastritis. Successful PEG-J tube placement.      Electronically signed by Lyubov Frye MD on 4/19/2022 at 3:50 PM

## 2022-04-19 NOTE — PROGRESS NOTES
[facility]  Internal Medicine Residency Program  Progress Note - House Team     Patient:  Nyla Ryder 36 y.o. female MRN: [id number]     Date of Service: 4/19/2022     CC: AMS, hypoglycemia  Overnight events: No acute events overnight     Subjective     Patient was seen and examined this morning at bedside in no acute distress. Willim Crumble. Patient states that nausea, vomiting, abdominal pain is unchanged. NPO for PEG-J this PM. Fluids changed to D10 + 150 NaCl. Objective     Physical Exam:  · Vitals: /89   Pulse 80   Temp 97.6 °F (36.4 °C)   Resp 16   Ht 5' 4\" (1.626 m)   Wt 133 lb 9.6 oz (60.6 kg)   SpO2 100%   BMI 22.93 kg/m²     · I & O - 24hr: No intake/output data recorded. · General Appearance: alert, appears stated age and cooperative  · HEENT:  Head: Normocephalic, no lesions, without obvious abnormality. · Neck: no adenopathy, no carotid bruit, no JVD, supple, symmetrical, trachea midline and thyroid not enlarged, symmetric, no tenderness/mass/nodules  · Lung: clear to auscultation bilaterally  · Heart: regular rate and rhythm, S1, S2 normal, no murmur, click, rub or gallop  · Abdomen: soft, non-tender; bowel sounds normal; no masses,  no organomegaly  · Extremities:  extremities normal, atraumatic, no cyanosis or edema  · Musculokeletal: No joint swelling, no muscle tenderness. ROM normal in all joints of extremities.    · Neurologic: Mental status: Alert, oriented, thought content appropriate  Subject  Pertinent Labs & Imaging Studies   jorge alberto  CBC:   Lab Results   Component Value Date    WBC 4.0 04/19/2022    RBC 3.23 04/19/2022    HGB 8.0 04/19/2022    HCT 25.6 04/19/2022    MCV 79.3 04/19/2022    MCH 24.8 04/19/2022    MCHC 31.3 04/19/2022    RDW 21.4 04/19/2022     04/19/2022    MPV 9.7 04/19/2022     BMP:    Lab Results   Component Value Date     04/19/2022    K 3.3 04/19/2022    K 4.0 04/14/2022     04/19/2022    CO2 27 04/19/2022    BUN 9 04/19/2022    LABALBU 3.6 04/19/2022    LABALBU 4.1 05/18/2012    CREATININE 3.4 04/19/2022    CALCIUM 9.3 04/19/2022    GFRAA 19 04/19/2022    LABGLOM 19 04/19/2022    GLUCOSE 108 04/19/2022    GLUCOSE 130 05/18/2012       XR CHEST PORTABLE   Final Result   Borderline cardiac size with vascular congestion. XR ABDOMEN (KUB) (SINGLE AP VIEW)   Final Result   No evidence of bowel obstruction. XR CHEST PORTABLE   Final Result   1. The lungs are clear. There is no infiltrate or effusion. 2. Stable position of the support lines and tubes. XR CHEST PORTABLE   Final Result   1. There is no acute cardiopulmonary disease   2. Stable position of the support lines and tubes. XR CHEST PORTABLE   Final Result   Infiltrate and or atelectasis at the left lower lobe. Substantially resolved   infiltrate and or atelectasis on the right. New NG tube. CT HEAD WO CONTRAST   Final Result   No acute intracranial abnormality or hemorrhage. CT ABDOMEN PELVIS WO CONTRAST Additional Contrast? None   Final Result   1. Moderate ascites throughout abdomen and pelvis. 2.  Multiple thick walled loops of small bowel throughout the abdomen could   suggest nonspecific infectious or inflammatory enteritis. 3.  Generalized body wall edema. 4.  Small bilateral pleural effusions with adjacent atelectatic changes. XR ABDOMEN FOR NG/OG/NE TUBE PLACEMENT   Final Result   Enteric tube tip in the body of the stomach. XR CHEST PORTABLE   Final Result   Right perihilar opacity. Resident's Assessment and Plan     Assessment:     1. Hypersensitive IDDM1-A1c 7.7% this admission  2. Nausea/Vomiting w/ Abdominal pain with Hx of gastroparesis 2/2 Hx of IDDM  3. HTN, on coreg BID  4. ESRD on HD TTS  5. Hypothyroidism-TSH 5.37, FT4 1.10 normal  6. Acute on chronic anemia-s/p 3 unit PRBCs, chronic component likely due to ESRD  7. History of MDRO UTI  8.  History of C. difficile infection  9. Oral Candidiasis  10. DKA - resolved  11. Acute hypoxic respiratory failure - resolved  12. Acute metabolic encephalopathy-likely due to hypoglycemia in setting of DM1 and ESRD (CTH negative, negative UDS/SDS, NH3 normal, B12 normal 1 month ago) - resolved  13. Aspiration pneumonia - resolved     Plan:  · Continue protonix BID, remeron 15 mg nightly, phenergan 25 mg TID, erythromycin to aid with abdominal pain/dyspepsia, Hold Reglan due to diarrhea  · Per Endocrine, 1U lantus nightly and modified SSI (1U 100>250 BS)              - Continue D10 + 150 NaCl @ 60 cc/hr              - POCT q2h, NPO for PEG-J today  · Continue home synthroid 88 mcg daily, abilify 5 mg nightly, and lexapro 10 mg daily, ropinirole 0.25 nightly  · Continue Coreg 12.5 mg BID              - Hypertensive but sitting pressure 113/87, 80/64 standing will hold off on increasing antihypertensives. · Continue oral mouth wash and orajel  · HD TThS per nephro, continue retacrit     PT/OT evaluation: not indicated  DVT prophylaxis/ GI prophylaxis: SCD/protonix  Disposition: continue current care    Will Mclean MD, PGY-1  Attending physician: Dr. Michael Ruiz  Attending Physician Statement:  Chante Shields M.D., F.A.C.P.     I have discussed the case, including pertinent history and exam findings with the resident/NP. I have seen and examined the patient and the key elements of the encounter have been performed by me. I agree with the resident ROS, PMHx, PSHx, meds reviewed and assessment, plan and orders as documented by the resident/NP    [facility] charts reviewed, including other providers notes, relevant labs and imaging.    >50% of time spent coordinating care with other providers and/or counseling patient/family  Remainder of medical problems as per resident note.        Long standing dm1-- autonomic neuroapthy +gastroparesis  Very brittle dm - even low doses insulin can drop BS-- when NOT eating     Plan peg J-- GI last saw on Tnursday, uncertain when NPO  M51 drip some insulin- high risk DKA - noted type 1 DM   --would like goal of at least 3-5 total units of insulin daily  +sugar water. Amp D50W +1-2 units insulin  Few times? Daily - bc type 1DM  ACD - getting epo  Hormonal workup otherwise [facility] Course:   The pt is a 37 y/o female with a PMHx of brittle T1DM, ESRD on HD, HTN, recent C. Diff infection, and endocarditis w/ consequent septic emboli formation, who presented to the ED from her long-term care facility and was hospitalized on 3/28/2022. Her presenting complaint was acute encephalopathy. Per note review, the pt's LKW was 1100 on 3/28/2022. She was found lethartic and altered at approx. 1700 by NH staff, and subsequent POCT BG was too low to be read by the glucometer. EMS was called, and the pt was transported emergently to [facility]. On arrival, she was intubated for airway protection, and was transferred to the MICU. On arrival, she was tachycardic, hypertensive, afebrile, O2 saturation 100% on the ventilator. Pro-BNP and troponin were elevated, and labs were also notable for anemia to 6.5 (baseline 6.0-8.0). UA showing pyuria and hematuria, moderate bacteria, and large LE. CXR showing possible infiltrate/edema, and CT abdomen/pelvis concerning for possible enteritis. CT head (-)ve.      Once in the MICU, the pt was started on empiric antibiotic treatment for possible aspiration pneumonia/pneumonitis, as well as suspected enteritis. Hemodialysis was restarted, and Endocrinology was consulted for aid with managing extremely labile blood sugars. Infectious cultures were negative, and the patient's antimicrobial regimen was de-escalated to Flagyl q8hrs (end date 4/4/2022). Her blood sugars were controlled w/ 1U Lantus nightly and a modified sliding scale.  She was successfully extubated, and has been tolerating room air well since that time.      On the floors patient has had difficulty in controlling blood glucose levels. Patient was on 1U Lantus w/ a modified sliding scale but still patient would have episodes of hypo and hyperglycemia. Patient complained of abdominal pain and nausea and would consistently not eat due to these symptoms. Patient was on Bentyl, Reglan, Zofran, Pepcid, Phenergan all with minimal relief. Patient was not eating much and on 4/4 and 4/5 patient's 1u Lantus was held despite being scheduled for nightly Lantus. BHB on 4/6 showed >4.50. Patient was treated on the floors with subq insulin and fluid. AG closed x2 and patient began feeling better. Patient still with episodes of hyperglycemia in 350s as well as hypoglycemia episodes. GI consulted with recs of starting patient on PPI BID as well as considering PEG-J tube at this time. Currently plans for patient to undergo PEG-J       Per GI today  We will proceed with placement of PEG-J tube and TFs.

## 2022-04-19 NOTE — FLOWSHEET NOTE
Attempted to call floor twice no answer, iv solution sent to dialysis in not the new order from Dr Azucena Medel. Did not hang solution sent.

## 2022-04-19 NOTE — OP NOTE
Operative Note      Patient: Cherlynn Severance  YOB: 1990  MRN: [id number]    Date of Procedure: 4/19/2022    Pre-Op Diagnosis: Gastroparesis, Malnutrition    Post-Op Diagnosis: Same       Procedure(s):  EGD PEG TUBE PLACEMENT    Surgeon(s):  Joseph Oseguera MD    Assistant:   * No surgical staff found *    Anesthesia: Monitor Anesthesia Care    Estimated Blood Loss (mL): Minimal    Complications: None    Specimens:   * No specimens in log *    Implants:  * No implants in log *      Drains:   Gastrostomy/Enterostomy/Jejunostomy PEG-Jejunostomy LUQ 1 (Active)       [REMOVED] NG/OG/NJ/NE Tube Orogastric 14 fr Right mouth (Removed)   Surrounding Skin Intact;Dry 03/30/22 0500   Securement device Yes 03/30/22 0500   Status Clamped 03/30/22 0500   Placement Verified by External Catheter Length 03/30/22 0500   NG/OG/NJ/NE External Measurement (cm) 53 cm 03/30/22 0500   Drainage Appearance Bile 03/28/22 2000   Tube Feeding Supplement Amount (mL) 0 03/29/22 0529   Tube Feeding Intake (mL) 0 ml 03/29/22 0529   Free Water Flush (mL) 120 mL 03/29/22 0529   Free Water Rate 0 03/29/22 0529   Output (mL) 0 ml 03/29/22 0529       [REMOVED] Urethral Catheter Straight-tip 16 fr (Removed)   Catheter Indications Need for fluid volume management of the critically ill patient in a critical care setting 03/29/22 0401   Site Assessment No urethral drainage 03/29/22 0401   Urine Color Yellow 03/29/22 0401   Urine Appearance Cloudy 03/29/22 0401   Output (mL) 5 mL 03/28/22 2222       [REMOVED] External Urinary Catheter (Removed)   Output (mL) 5 mL 03/29/22 0529     Indications:  31y/F w/ poorly controlled DMI c/b severe gastroparesis leading to recurrent poor PO intake and subsequent malnutrition and labile glucose control presents for placement of PEG-J tube. Findings:   Gastritis. Successful placement of PEG-J tube.      Detailed Description of Procedure:   Pre-Anesthesia Assessment:  Prior to the procedure, a history and physical was performed and patient medications and allergies were reviewed. The risks, benefits, complications, treatment options and expected outcomes were discussed with the patient. The possibilities of reaction to medication, pulmonary aspiration, perforation of the gastrointestinal tract, bleeding requiring transfusion or operation, respiratory failure requiring placement on a ventilator, failure to diagnose a condition, and injury to nearby organs were discussed with the patient and her POA. All questions were answered and informed consent was obtained. Patient identification and proposed procedure were verified by the physician, the nurse, the anesthesiologist, the anesthetist, and the technician in the preprocedure area. Please see accompanying documentation. All staff in attendance for the performed procedure act in the role of the Chaperone. After I obtained informed consent, the scope was passed under direct vision. Throughout the procedure, the patient's blood pressure, pulse, and oxygen saturations were monitored continuously. The gastroscope was introduced through the mouth and advanced to the proximal jejunum. The procedure was performed without difficulty. The patient tolerated the procedure well. EGD:  The mucosa of the esophagus appeared normal.  No stricture, ulceration, or inflammation was appreciated. The Z-line was regular and found at 37cm. No hiatal hernia was appreciated. A large amount of fluid and moderate amount of food debris was appreciated in the fundus. This was immediately suctioned. The mucosa of the body of the stomach showed diffuse gastritis with erythema, granularity, friability, and erosions appreciated. No ulceration was appreciated. The mucosa of the antrum and pylorus was noted to be normal. No ulceration or stenosis was appreciated. The fundus and cardia were carefully inspected in retroflexion and showed normal mucosa.     The mucosa of the duodenum position of the gastrostomy tube was confirmed by relook endoscopy, and skin marking was noted to be 4 centimeters at the external bumper. The final tension and compression of the abdominal wall by the PEG tube and external bumper were checked. The feeding tube was capped, and the tube site cleaned and dressed. Recommendations:  -The patient will be observed post procedure until stable for transfer to the floor.  -Okay to begin using PEG tube for medications J-extension for tube feeds tonight.   -Educational material on use and care of PEG-J tube was given to the patient.   -Continue PPI BID.   -Okay to d/c metoclopramide.   -Tube feed type and rate per Nutrition.              Electronically signed by Kalee Bob MD on 4/19/2022 at 3:50 PM

## 2022-04-19 NOTE — PROGRESS NOTES
Department of Internal Medicine  Nephrology Progress Note      Events reviewed. SUBJECTIVE:  We are following Miss Florence Coughlin for ESKD on HD. Patient reports no complaints today.     PHYSICAL EXAM:      Vitals:    VITALS:  BP (!) 142/87   Pulse 79   Temp 97.6 °F (36.4 °C)   Resp 16   Ht 5' 4\" (1.626 m)   Wt 133 lb 9.6 oz (60.6 kg)   SpO2 100%   BMI 22.93 kg/m²   24HR INTAKE/OUTPUT:    No intake or output data in the 24 hours ending 04/19/22 1208    Access: RIJ tunneled dialysis catheter  Constitutional: Awake, alert, NAD  HEENT:  PERRL, normocephalic, atraumatic  Respiratory:  CTA bilateral  Cardiovascular/Edema:  ST, RRR, no murmur gallop or rub  Gastrointestinal:  abd distended, c/o persistent abdominal pain  Neurologic: A&OX3 follows commands, no focal deficit  Skin:  Warm, dry, ecchymotic areas to abdomen    Other:    Scheduled Meds:   sodium chloride flush  5-40 mL IntraVENous 2 times per day    [Held by provider] metoclopramide  5 mg Oral TID AC    insulin glargine  1 Units SubCUTAneous Nightly    labetalol  10 mg IntraVENous Once    insulin lispro  0-3 Units SubCUTAneous 4x Daily AC & HS    carvedilol  12.5 mg Oral BID    [START ON 4/20/2022] erythromycin  500 mg IntraVENous Once per day on Tue Thu Sat    [Held by provider] bisacodyl  5 mg Oral Daily    pantoprazole  40 mg Oral BID AC    promethazine  25 mg Oral TID AC    acetaminophen  650 mg Oral Q6H    Or    acetaminophen  650 mg Rectal Q6H    melatonin  6 mg Oral Nightly    sodium chloride flush  5-40 mL IntraVENous 2 times per day    heparin flush  1 mL IntraVENous 2 times per day    mirtazapine  15 mg Oral Nightly    escitalopram  10 mg Oral Daily    ARIPiprazole  5 mg Oral Nightly    rOPINIRole  0.25 mg Oral Nightly    levothyroxine  88 mcg Oral Daily    epoetin nena-epbx  3,000 Units SubCUTAneous Once per day on Mon Wed Fri    Vitamin D  1,000 Units Per NG tube Daily     Continuous Infusions:   sodium chloride      IV infusion builder      dextrose 5 % and 0.9 % NaCl 50 mL/hr at 04/18/22 2348    sodium chloride      dextrose Stopped (04/12/22 2307)     PRN Meds:.sodium chloride flush, sodium chloride, benzocaine, [Held by provider] labetalol, glucose, diphenhydrAMINE, magic (miracle) mouthwash, ondansetron, trimethobenzamide, loperamide, sodium chloride flush, sodium chloride, heparin flush, white petrolatum, polyethylene glycol, dextrose, glucagon (rDNA), dextrose, albuterol    DATA:    CBC:   Lab Results   Component Value Date    WBC 4.0 04/19/2022    RBC 3.23 04/19/2022    HGB 8.0 04/19/2022    HCT 25.6 04/19/2022    MCV 79.3 04/19/2022    MCH 24.8 04/19/2022    MCHC 31.3 04/19/2022    RDW 21.4 04/19/2022     04/19/2022    MPV 9.7 04/19/2022     CMP:    Lab Results   Component Value Date     04/19/2022    K 3.3 04/19/2022    K 4.0 04/14/2022     04/19/2022    CO2 27 04/19/2022    BUN 9 04/19/2022    CREATININE 3.4 04/19/2022    GFRAA 19 04/19/2022    LABGLOM 19 04/19/2022    GLUCOSE 108 04/19/2022    GLUCOSE 130 05/18/2012    PROT 7.0 04/19/2022    LABALBU 3.6 04/19/2022    LABALBU 4.1 05/18/2012    CALCIUM 9.3 04/19/2022    BILITOT 0.4 04/19/2022    ALKPHOS 157 04/19/2022    AST 11 04/19/2022    ALT 5 04/19/2022     Magnesium:    Lab Results   Component Value Date    MG 1.9 04/19/2022     Phosphorus:    Lab Results   Component Value Date    PHOS 2.2 04/19/2022     Radiology Review:      CT Head WO Contrast March 27, 2022   No acute intracranial abnormality or hemorrhage.         CT Abdomen Pelvis WO Contrast March 27, 2022   1.  Moderate ascites throughout abdomen and pelvis.       2.  Multiple thick walled loops of small bowel throughout the abdomen could   suggest nonspecific infectious or inflammatory enteritis.       3.  Generalized body wall edema.       4.  Small bilateral pleural effusions with adjacent atelectatic changes.           Chest X-Ray March 28, 2022   Infiltrate and or atelectasis at the left lower lobe.  Substantially resolved   infiltrate and or atelectasis on the right.  New NG tube.           BRIEF SUMMARY OF INITIAL CONSULT:    Briefly, Miss Mya Desouza is a 36year-old female with a PMH of ESRD on hemodialysis 3 days/wk, on TTS schedule at [facility] I DM, poorly controlled with recurrent admissions for DKA, HTN, gastroparesis, ascites, infective endocarditis, cardiac arrest, hypothyroidism, recent Covid, and depression who was admitted on March 27, 2022 after she was found unresponsive at the SNF where she resides. Patient was found to be profoundly hypoglycemic and EMS was called. She was intubated in ER for airway protection as she remained unresponsive. CT head showed no acute intracranial abnormality or hemorrhage, chest x-ray demonstrates right perihilar opacity concerning for aspiration pneumonia. She was ultimately admitted to MICU for further evaluation. Labs on admission were significant for sodium 135, potassium 5.0, chloride 100, bicarbonate 25, BUN 38, creatinine 4.9, proBNP >70,000. We are consulted for dialysis management. Problems resolved. · Hypoglycemia, was on D10, now hyperglycemic with +ketones (beta-hydroxybutyrate >4.5)  · Acute respiratory failure, 2/2 aspiration pneumonia? On 40% Fio2. Extubated 6/66  · Acute metabolic encephalopathy 2/2 hypoglycemia. Resolving   · Acidemia, pH 7.311, PCO2 35.0, HCO3 17.5, metabolic acidosis from DKA  · Hx recurrent C. difficile infection, on flagyl po Q 8hrs   · Hx of MDRO UTI  · Aspiration pneumonia? S/p piperacillin      IMPRESSION/RECOMMENDATIONS:      1. ESKD 3 times a week TTS via RIJ tunneled dialysis catheter. For dialysis tomorrow. 2. HTN, on carvediolol 12.5 mg bid  3. MBD of CKD, binders on hold. 4. Anemia of CKD, continue epoetin alpha 3,000 unit 3 times/wk. s/p transfusion   5. Vitamin D deficiency, on ergocalciferol 1000 po daily  ------------------------------------------------------  6.  Type I DM, poorly controlled with frequent readmissions for DKA, on SSI, lantus decreased to 1 unit nightly   7. Hypoglycemia, to start D10 with 150 mEq of sodium chloride at 60 cc/hour  8. Restless leg syndrome, on ropinirole  9. Depression, on escitalopram and Abilify  10. Hypothyroidism, on levothyroxine   11. Gastroparesis, on metoclopramide,  For EGD and pyloric dilatation with Botox injection outpatient per GI. On erythromycin with HD when available. 12. Nutrition: 4 PEG tube placement today.     Plan:     · Change IV fluids to D10 with 150 mEq of sodium chloride at 60 cc/hour  · Continue HD three times/wk, TTS, for HD tomorrow   · Erythromycin 500 mg IV with HD when available  · Continue epoetin alpha 3,000 units 3 times/wk  · Continue carvedilol to 12.5  mg bid  · Continue to monitor glucose levels  · Continue to monitor daily labs  · For PEG placement today.         Electronically signed by Frederick Mcfarlane MD on 4/19/2022 at 12:08 PM

## 2022-04-19 NOTE — PROGRESS NOTES
ENDOCRINOLOGY PROGRESS NOTE      Date of admission: 3/27/2022  Date of service: 4/19/2022  Admitting physician: Lyric Chandler DO   Primary Care Physician: Mookie Roque MD  Consultant physician: Shubham Dejesus MD     Reason for the consultation:  DM type 1, labile diabetes     History of Present Illness: The history is provided from medical records, pt sedated intubated     Antonio Retana is a 36 y.o. old female nursing home resident with PMH of Type I DM, ESRD on PD,HTN,hypothyroidism, infective endocarditis and other listed below admitted to [facility] on 3/27/2022 because of AMS. Endocrine service was consulted for DM management. subjective   Pt was seen and examined, continues not eating, BG was better controlled. AG remains closed.  For feeding tube placement today    Point of care glucose monitoring (Independently reviewed)   Recent Labs     04/18/22  1307 04/18/22  1536 04/18/22  1743 04/18/22  2033 04/18/22  2353 04/19/22  0240 04/19/22  0347 04/19/22  0508   GLUMET 215* 207* 263* 222* 142* 124* 116* 114*     Scheduled Meds:   sodium chloride flush  5-40 mL IntraVENous 2 times per day    potassium chloride  10 mEq IntraVENous Q1H    [Held by provider] metoclopramide  5 mg Oral TID AC    insulin glargine  1 Units SubCUTAneous Nightly    labetalol  10 mg IntraVENous Once    insulin lispro  0-3 Units SubCUTAneous 4x Daily AC & HS    carvedilol  12.5 mg Oral BID    [START ON 4/20/2022] erythromycin  500 mg IntraVENous Once per day on Tue Thu Sat    [Held by provider] bisacodyl  5 mg Oral Daily    pantoprazole  40 mg Oral BID AC    promethazine  25 mg Oral TID AC    acetaminophen  650 mg Oral Q6H    Or    acetaminophen  650 mg Rectal Q6H    melatonin  6 mg Oral Nightly    sodium chloride flush  5-40 mL IntraVENous 2 times per day    heparin flush  1 mL IntraVENous 2 times per day    mirtazapine  15 mg Oral Nightly    escitalopram  10 mg Oral Daily    ARIPiprazole  5 mg Oral Nightly    rOPINIRole  0.25 mg Oral Nightly    levothyroxine  88 mcg Oral Daily    epoetin nena-epbx  3,000 Units SubCUTAneous Once per day on Mon Wed Fri    Vitamin D  1,000 Units Per NG tube Daily     PRN Meds:   sodium chloride flush, 5-40 mL, PRN  sodium chloride, 25 mL, PRN  benzocaine, , BID PRN  [Held by provider] labetalol, 5 mg, Q6H PRN  glucose, 4 each, PRN  diphenhydrAMINE, 25 mg, Q6H PRN  magic (miracle) mouthwash, 5 mL, 4x Daily PRN  ondansetron, 4 mg, Q4H PRN  trimethobenzamide, 200 mg, Q6H PRN  loperamide, 2 mg, 4x Daily PRN  sodium chloride flush, 5-40 mL, PRN  sodium chloride, , PRN  heparin flush, 1 mL, PRN  white petrolatum, , BID PRN  polyethylene glycol, 17 g, Daily PRN  dextrose, 12.5 g, PRN  glucagon (rDNA), 1 mg, PRN  dextrose, 100 mL/hr, PRN  albuterol, 2.5 mg, Q6H PRN      Continuous Infusions:   sodium chloride      IV infusion builder      dextrose 5 % and 0.9 % NaCl 50 mL/hr at 04/18/22 2348    sodium chloride      dextrose Stopped (04/12/22 2307)       Review of Systems  Non-obtainable     OBJECTIVE    /77   Pulse 79   Temp 97.6 °F (36.4 °C)   Resp 16   Ht 5' 4\" (1.626 m)   Wt 133 lb 9.6 oz (60.6 kg)   SpO2 100%   BMI 22.93 kg/m²   No intake or output data in the 24 hours ending 04/19/22 1014  Physical examination:  General: awake alert, oriented x3  HEENT: normocephalic non traumatic, no exophthalmos   Neck: supple, thyroid tenderness,  Pulm: Clear equal air entry no added sounds  CVS: S1 + S2  Abd: soft lax, no tenderness  Skin: warm, no lesions, no rash.       Review of Laboratory Data:  I personally reviewed the following labs:   Recent Labs     04/17/22  5446 04/19/22  0529   WBC 6.0 4.0*   RBC 4.17 3.23*   HGB 10.5* 8.0*   HCT 33.0* 25.6*   MCV 79.1* 79.3*   MCH 25.2* 24.8*   MCHC 31.8* 31.3*   RDW 21.1* 21.4*    172   MPV NOT CALC 9.7     Recent Labs     04/17/22  1130 04/18/22  0542 04/19/22  0519    137 139   K 5.0 3.5 3.3*   CL 98 98 102   CO2 22 28 27 BUN 13 6 9   CREATININE 2.7* 2.2* 3.4*   GLUCOSE 163* 110* 108*   CALCIUM 9.5 9.2 9.3   PROT  --   --  7.0   LABALBU  --   --  3.6   BILITOT  --   --  0.4   ALKPHOS  --   --  157*   AST  --   --  11   ALT  --   --  5     Beta-Hydroxybutyrate   Date Value Ref Range Status   04/17/2022 0.74 (H) 0.02 - 0.27 mmol/L Final   04/13/2022 2.56 (H) 0.02 - 0.27 mmol/L Final   04/06/2022 >4.50 (H) 0.02 - 0.27 mmol/L Final     Lab Results   Component Value Date    LABA1C 7.7 03/02/2022    LABA1C 8.7 12/08/2021    LABA1C 10.2 11/23/2021     Lab Results   Component Value Date/Time    TSH 5.370 (H) 03/28/2022 01:56 AM    T4FREE 1.10 03/28/2022 01:56 AM    S8XLSNA 8.6 11/05/2015 02:20 AM    FT3 1.3 (L) 10/31/2020 10:43 AM    T3QROMC 36.73 (L) 07/20/2020 02:00 PM     Lab Results   Component Value Date    LABA1C 7.7 03/02/2022    GLUCOSE 108 04/19/2022    GLUCOSE 130 05/18/2012    MALBCR 2949.3 01/15/2020    LABMICR 1740.1 01/15/2020    LABCREA 59 01/15/2020    LABCREA 61 01/15/2020     Lab Results   Component Value Date    TRIG 82 01/14/2020    HDL 33 01/14/2020    LDLCALC 46 01/14/2020    CHOL 95 01/14/2020       Blood culture   Lab Results   Component Value Date    BC 5 Days no growth 03/28/2022    BC 5 Days no growth 03/27/2022       Radiology:  XR CHEST PORTABLE   Final Result   Borderline cardiac size with vascular congestion. XR ABDOMEN (KUB) (SINGLE AP VIEW)   Final Result   No evidence of bowel obstruction. XR CHEST PORTABLE   Final Result   1. The lungs are clear. There is no infiltrate or effusion. 2. Stable position of the support lines and tubes. XR CHEST PORTABLE   Final Result   1. There is no acute cardiopulmonary disease   2. Stable position of the support lines and tubes. XR CHEST PORTABLE   Final Result   Infiltrate and or atelectasis at the left lower lobe. Substantially resolved   infiltrate and or atelectasis on the right. New NG tube.          CT HEAD WO CONTRAST   Final [facility] [address]   Phone: [phone]  Fax: [phone]  ----------------------------  An electronic signature was used to authenticate this note.  Dennie Star, MD on 4/19/2022 at 10:14 AM

## 2022-04-20 LAB
ANION GAP SERPL CALCULATED.3IONS-SCNC: 11 MMOL/L (ref 7–16)
ANION GAP SERPL CALCULATED.3IONS-SCNC: 7 MMOL/L (ref 7–16)
ANION GAP SERPL CALCULATED.3IONS-SCNC: 8 MMOL/L (ref 7–16)
BETA-HYDROXYBUTYRATE: 0.06 MMOL/L (ref 0.02–0.27)
BUN BLDV-MCNC: 3 MG/DL (ref 6–20)
BUN BLDV-MCNC: 4 MG/DL (ref 6–20)
BUN BLDV-MCNC: 6 MG/DL (ref 6–20)
CALCIUM SERPL-MCNC: 8.3 MG/DL (ref 8.6–10.2)
CALCIUM SERPL-MCNC: 8.4 MG/DL (ref 8.6–10.2)
CALCIUM SERPL-MCNC: 8.5 MG/DL (ref 8.6–10.2)
CHLORIDE BLD-SCNC: 101 MMOL/L (ref 98–107)
CHLORIDE BLD-SCNC: 104 MMOL/L (ref 98–107)
CHLORIDE BLD-SCNC: 106 MMOL/L (ref 98–107)
CO2: 21 MMOL/L (ref 22–29)
CO2: 24 MMOL/L (ref 22–29)
CO2: 25 MMOL/L (ref 22–29)
CREAT SERPL-MCNC: 2.1 MG/DL (ref 0.5–1)
CREAT SERPL-MCNC: 2.2 MG/DL (ref 0.5–1)
CREAT SERPL-MCNC: 2.7 MG/DL (ref 0.5–1)
GFR AFRICAN AMERICAN: 25
GFR AFRICAN AMERICAN: 32
GFR AFRICAN AMERICAN: 33
GFR NON-AFRICAN AMERICAN: 25 ML/MIN/1.73
GFR NON-AFRICAN AMERICAN: 32 ML/MIN/1.73
GFR NON-AFRICAN AMERICAN: 33 ML/MIN/1.73
GLUCOSE BLD-MCNC: 135 MG/DL (ref 74–99)
GLUCOSE BLD-MCNC: 289 MG/DL (ref 74–99)
GLUCOSE BLD-MCNC: 356 MG/DL (ref 74–99)
HCT VFR BLD CALC: 26.8 % (ref 34–48)
HEMOGLOBIN: 8.3 G/DL (ref 11.5–15.5)
MAGNESIUM: 1.7 MG/DL (ref 1.6–2.6)
MAGNESIUM: 2.4 MG/DL (ref 1.6–2.6)
MCH RBC QN AUTO: 25.1 PG (ref 26–35)
MCHC RBC AUTO-ENTMCNC: 31 % (ref 32–34.5)
MCV RBC AUTO: 81 FL (ref 80–99.9)
METER GLUCOSE: 146 MG/DL (ref 74–99)
METER GLUCOSE: 170 MG/DL (ref 74–99)
METER GLUCOSE: 201 MG/DL (ref 74–99)
METER GLUCOSE: 266 MG/DL (ref 74–99)
METER GLUCOSE: 376 MG/DL (ref 74–99)
METER GLUCOSE: 50 MG/DL (ref 74–99)
PDW BLD-RTO: 21.2 FL (ref 11.5–15)
PHOSPHORUS: 1.5 MG/DL (ref 2.5–4.5)
PHOSPHORUS: 2.4 MG/DL (ref 2.5–4.5)
PLATELET # BLD: 158 E9/L (ref 130–450)
PMV BLD AUTO: 10.1 FL (ref 7–12)
POTASSIUM SERPL-SCNC: 3.5 MMOL/L (ref 3.5–5)
POTASSIUM SERPL-SCNC: 4.2 MMOL/L (ref 3.5–5)
POTASSIUM SERPL-SCNC: 4.8 MMOL/L (ref 3.5–5)
RBC # BLD: 3.31 E12/L (ref 3.5–5.5)
SODIUM BLD-SCNC: 133 MMOL/L (ref 132–146)
SODIUM BLD-SCNC: 136 MMOL/L (ref 132–146)
SODIUM BLD-SCNC: 138 MMOL/L (ref 132–146)
WBC # BLD: 4 E9/L (ref 4.5–11.5)

## 2022-04-20 PROCEDURE — 99232 SBSQ HOSP IP/OBS MODERATE 35: CPT | Performed by: INTERNAL MEDICINE

## 2022-04-20 PROCEDURE — 2580000003 HC RX 258: Performed by: STUDENT IN AN ORGANIZED HEALTH CARE EDUCATION/TRAINING PROGRAM

## 2022-04-20 PROCEDURE — 82010 KETONE BODYS QUAN: CPT

## 2022-04-20 PROCEDURE — 6370000000 HC RX 637 (ALT 250 FOR IP): Performed by: INTERNAL MEDICINE

## 2022-04-20 PROCEDURE — 6360000002 HC RX W HCPCS: Performed by: STUDENT IN AN ORGANIZED HEALTH CARE EDUCATION/TRAINING PROGRAM

## 2022-04-20 PROCEDURE — 80048 BASIC METABOLIC PNL TOTAL CA: CPT

## 2022-04-20 PROCEDURE — 82962 GLUCOSE BLOOD TEST: CPT

## 2022-04-20 PROCEDURE — 6360000002 HC RX W HCPCS: Performed by: INTERNAL MEDICINE

## 2022-04-20 PROCEDURE — 83735 ASSAY OF MAGNESIUM: CPT

## 2022-04-20 PROCEDURE — 99233 SBSQ HOSP IP/OBS HIGH 50: CPT | Performed by: INTERNAL MEDICINE

## 2022-04-20 PROCEDURE — 1200000000 HC SEMI PRIVATE

## 2022-04-20 PROCEDURE — 6370000000 HC RX 637 (ALT 250 FOR IP): Performed by: STUDENT IN AN ORGANIZED HEALTH CARE EDUCATION/TRAINING PROGRAM

## 2022-04-20 PROCEDURE — 2500000003 HC RX 250 WO HCPCS: Performed by: INTERNAL MEDICINE

## 2022-04-20 PROCEDURE — 84100 ASSAY OF PHOSPHORUS: CPT

## 2022-04-20 PROCEDURE — 36415 COLL VENOUS BLD VENIPUNCTURE: CPT

## 2022-04-20 PROCEDURE — 94660 CPAP INITIATION&MGMT: CPT

## 2022-04-20 PROCEDURE — 82390 ASSAY OF CERULOPLASMIN: CPT

## 2022-04-20 PROCEDURE — 85027 COMPLETE CBC AUTOMATED: CPT

## 2022-04-20 PROCEDURE — 6370000000 HC RX 637 (ALT 250 FOR IP): Performed by: NURSE PRACTITIONER

## 2022-04-20 PROCEDURE — 2580000003 HC RX 258: Performed by: INTERNAL MEDICINE

## 2022-04-20 PROCEDURE — 84630 ASSAY OF ZINC: CPT

## 2022-04-20 PROCEDURE — 6360000002 HC RX W HCPCS: Performed by: NURSE PRACTITIONER

## 2022-04-20 RX ORDER — MIRTAZAPINE 15 MG/1
30 TABLET, FILM COATED ORAL NIGHTLY
Status: DISCONTINUED | OUTPATIENT
Start: 2022-04-20 | End: 2022-04-22

## 2022-04-20 RX ORDER — THIAMINE HYDROCHLORIDE 100 MG/ML
100 INJECTION, SOLUTION INTRAMUSCULAR; INTRAVENOUS DAILY
Status: DISCONTINUED | OUTPATIENT
Start: 2022-04-20 | End: 2022-04-25 | Stop reason: HOSPADM

## 2022-04-20 RX ORDER — INSULIN LISPRO 100 [IU]/ML
0-6 INJECTION, SOLUTION INTRAVENOUS; SUBCUTANEOUS
Status: DISCONTINUED | OUTPATIENT
Start: 2022-04-20 | End: 2022-04-20

## 2022-04-20 RX ORDER — INSULIN LISPRO 100 [IU]/ML
0-3 INJECTION, SOLUTION INTRAVENOUS; SUBCUTANEOUS
Status: DISCONTINUED | OUTPATIENT
Start: 2022-04-20 | End: 2022-04-20

## 2022-04-20 RX ORDER — DEXTROSE MONOHYDRATE 50 MG/ML
INJECTION, SOLUTION INTRAVENOUS CONTINUOUS
Status: DISCONTINUED | OUTPATIENT
Start: 2022-04-21 | End: 2022-04-21

## 2022-04-20 RX ORDER — MAGNESIUM SULFATE IN WATER 40 MG/ML
2000 INJECTION, SOLUTION INTRAVENOUS ONCE
Status: COMPLETED | OUTPATIENT
Start: 2022-04-20 | End: 2022-04-20

## 2022-04-20 RX ORDER — PROMETHAZINE HYDROCHLORIDE 25 MG/1
25 TABLET ORAL
Status: COMPLETED | OUTPATIENT
Start: 2022-04-20 | End: 2022-04-20

## 2022-04-20 RX ORDER — PROMETHAZINE HYDROCHLORIDE 25 MG/1
25 TABLET ORAL EVERY 6 HOURS PRN
Status: DISCONTINUED | OUTPATIENT
Start: 2022-04-21 | End: 2022-04-25 | Stop reason: HOSPADM

## 2022-04-20 RX ORDER — INSULIN LISPRO 100 [IU]/ML
0-4 INJECTION, SOLUTION INTRAVENOUS; SUBCUTANEOUS
Status: DISCONTINUED | OUTPATIENT
Start: 2022-04-21 | End: 2022-04-21

## 2022-04-20 RX ORDER — INSULIN GLARGINE-YFGN 100 [IU]/ML
2 INJECTION, SOLUTION SUBCUTANEOUS NIGHTLY
Status: DISCONTINUED | OUTPATIENT
Start: 2022-04-20 | End: 2022-04-20

## 2022-04-20 RX ORDER — INSULIN GLARGINE-YFGN 100 [IU]/ML
1 INJECTION, SOLUTION SUBCUTANEOUS EVERY MORNING
Status: DISCONTINUED | OUTPATIENT
Start: 2022-04-21 | End: 2022-04-21

## 2022-04-20 RX ADMIN — ONDANSETRON 4 MG: 2 INJECTION INTRAMUSCULAR; INTRAVENOUS at 20:46

## 2022-04-20 RX ADMIN — EPOETIN ALFA-EPBX 3000 UNITS: 3000 INJECTION, SOLUTION INTRAVENOUS; SUBCUTANEOUS at 10:21

## 2022-04-20 RX ADMIN — VASOPRESSIN: 20 INJECTION, SOLUTION INTRAVENOUS at 06:02

## 2022-04-20 RX ADMIN — LOPERAMIDE HYDROCHLORIDE 2 MG: 2 CAPSULE ORAL at 20:46

## 2022-04-20 RX ADMIN — LEVOTHYROXINE SODIUM 88 MCG: 0.09 TABLET ORAL at 05:46

## 2022-04-20 RX ADMIN — CARVEDILOL 12.5 MG: 6.25 TABLET, FILM COATED ORAL at 20:11

## 2022-04-20 RX ADMIN — Medication 1000 UNITS: at 10:20

## 2022-04-20 RX ADMIN — DEXTROSE MONOHYDRATE: 50 INJECTION, SOLUTION INTRAVENOUS at 23:48

## 2022-04-20 RX ADMIN — DIPHENHYDRAMINE HYDROCHLORIDE 25 MG: 50 INJECTION, SOLUTION INTRAMUSCULAR; INTRAVENOUS at 23:34

## 2022-04-20 RX ADMIN — DIPHENHYDRAMINE HYDROCHLORIDE 25 MG: 50 INJECTION, SOLUTION INTRAMUSCULAR; INTRAVENOUS at 01:00

## 2022-04-20 RX ADMIN — ACETAMINOPHEN 650 MG: 325 TABLET ORAL at 18:51

## 2022-04-20 RX ADMIN — DIPHENHYDRAMINE HYDROCHLORIDE 25 MG: 50 INJECTION, SOLUTION INTRAMUSCULAR; INTRAVENOUS at 16:37

## 2022-04-20 RX ADMIN — HYDROMORPHONE HYDROCHLORIDE 0.5 MG: 1 INJECTION, SOLUTION INTRAMUSCULAR; INTRAVENOUS; SUBCUTANEOUS at 01:00

## 2022-04-20 RX ADMIN — Medication 6 MG: at 20:11

## 2022-04-20 RX ADMIN — DIPHENHYDRAMINE HYDROCHLORIDE 25 MG: 50 INJECTION, SOLUTION INTRAMUSCULAR; INTRAVENOUS at 10:41

## 2022-04-20 RX ADMIN — SODIUM CHLORIDE, PRESERVATIVE FREE 100 UNITS: 5 INJECTION INTRAVENOUS at 20:14

## 2022-04-20 RX ADMIN — PROMETHAZINE HYDROCHLORIDE 25 MG: 25 TABLET ORAL at 10:41

## 2022-04-20 RX ADMIN — PROMETHAZINE HYDROCHLORIDE 25 MG: 25 TABLET ORAL at 16:37

## 2022-04-20 RX ADMIN — ROPINIROLE 0.25 MG: 0.25 TABLET, FILM COATED ORAL at 20:11

## 2022-04-20 RX ADMIN — ARIPIPRAZOLE 5 MG: 5 TABLET ORAL at 20:11

## 2022-04-20 RX ADMIN — Medication 4 EACH: at 20:29

## 2022-04-20 RX ADMIN — PANTOPRAZOLE SODIUM 40 MG: 40 TABLET, DELAYED RELEASE ORAL at 05:46

## 2022-04-20 RX ADMIN — THIAMINE HYDROCHLORIDE 100 MG: 100 INJECTION, SOLUTION INTRAMUSCULAR; INTRAVENOUS at 16:38

## 2022-04-20 RX ADMIN — PROMETHAZINE HYDROCHLORIDE 25 MG: 25 TABLET ORAL at 05:46

## 2022-04-20 RX ADMIN — Medication 10 ML: at 10:21

## 2022-04-20 RX ADMIN — ACETAMINOPHEN 650 MG: 325 TABLET ORAL at 05:46

## 2022-04-20 RX ADMIN — DEXTROSE MONOHYDRATE 100 ML/HR: 50 INJECTION, SOLUTION INTRAVENOUS at 21:01

## 2022-04-20 RX ADMIN — ESCITALOPRAM 10 MG: 10 TABLET, FILM COATED ORAL at 10:23

## 2022-04-20 RX ADMIN — Medication 10 ML: at 20:50

## 2022-04-20 RX ADMIN — MIRTAZAPINE 30 MG: 15 TABLET, FILM COATED ORAL at 20:11

## 2022-04-20 RX ADMIN — ACETAMINOPHEN 650 MG: 325 TABLET ORAL at 12:17

## 2022-04-20 RX ADMIN — INSULIN LISPRO 2 UNITS: 100 INJECTION, SOLUTION INTRAVENOUS; SUBCUTANEOUS at 10:35

## 2022-04-20 RX ADMIN — SODIUM PHOSPHATE, MONOBASIC, MONOHYDRATE 15 MMOL: 276; 142 INJECTION, SOLUTION INTRAVENOUS at 12:11

## 2022-04-20 RX ADMIN — PANTOPRAZOLE SODIUM 40 MG: 40 TABLET, DELAYED RELEASE ORAL at 16:37

## 2022-04-20 RX ADMIN — INSULIN LISPRO 1 UNITS: 100 INJECTION, SOLUTION INTRAVENOUS; SUBCUTANEOUS at 16:51

## 2022-04-20 RX ADMIN — CARVEDILOL 12.5 MG: 6.25 TABLET, FILM COATED ORAL at 10:20

## 2022-04-20 RX ADMIN — MAGNESIUM SULFATE HEPTAHYDRATE 2000 MG: 40 INJECTION, SOLUTION INTRAVENOUS at 10:24

## 2022-04-20 ASSESSMENT — PAIN SCALES - GENERAL
PAINLEVEL_OUTOF10: 7
PAINLEVEL_OUTOF10: 8
PAINLEVEL_OUTOF10: 5
PAINLEVEL_OUTOF10: 5
PAINLEVEL_OUTOF10: 0

## 2022-04-20 ASSESSMENT — PAIN DESCRIPTION - LOCATION: LOCATION: ABDOMEN

## 2022-04-20 ASSESSMENT — PAIN DESCRIPTION - DESCRIPTORS: DESCRIPTORS: ACHING

## 2022-04-20 NOTE — PROGRESS NOTES
Nutrition Note    Consult received for TF Order and Management. TF recs provided. See RD note 4/18 for full assessment. Will follow per policy and provide updated TF recs as needed. Recommend: Renal Formula (Nepro 1.8) @40ml/hr x23hr (hold TF 30 min before and after synthroid). Provides: 920cc TV, 1656kcal, 75g Protein, 670ml free water. Flush per renal management. Note: Regimen meets 100% estimated protein and calorie needs. Note: Pt. is at risk for refeeding syndrome. Monitor electrolytes and replace PRN, recommend advancing slowly to goal.    Noted hyperglycemia  ~100-200 , formula is w/ consideration for CKD and DM.        Electronically signed by Hernandez Giraldo, [address] on 4/20/22 at 9:55 AM EDT    Contact: [phone]

## 2022-04-20 NOTE — CARE COORDINATION
Confirmed with Pt that Discharge Plan is to return home with family providing assistance. Mother is an RN. [facility] RN Hussain Hoffman ([phone])  Needs called at discharge. Pt's Mother declined sick time. Referral was made to MARCELO MANDEL/MICHEL ricci for discharge needs.    ALTAGRACIA TalaveraS.W.  [phone]

## 2022-04-20 NOTE — PLAN OF CARE
Problem: Skin Integrity:  Goal: Will show no infection signs and symptoms  Description: Will show no infection signs and symptoms  4/20/2022 1047 by Jackelyn Gonzales RN  Outcome: Progressing  4/20/2022 0034 by Mar Lucia RN  Outcome: Met This Shift  Goal: Absence of new skin breakdown  Description: Absence of new skin breakdown  4/20/2022 1047 by Jackelyn Gonzales RN  Outcome: Progressing  4/20/2022 0034 by Mar Lucia RN  Outcome: Met This Shift     Problem: Serum Glucose Level - Abnormal:  Goal: Ability to maintain appropriate glucose levels has stabilized  Description: Ability to maintain appropriate glucose levels has stabilized  Outcome: Progressing     Problem: Breathing Pattern - Ineffective:  Goal: Ability to achieve and maintain a regular respiratory rate will improve  Description: Ability to achieve and maintain a regular respiratory rate will improve  Outcome: Progressing     Problem: Cardiac Output - Decreased:  Goal: Hemodynamic stability will improve  Description: Hemodynamic stability will improve  Outcome: Progressing     Problem: Diarrhea:  Goal: Bowel elimination is within specified parameters  Description: Bowel elimination is within specified parameters  4/20/2022 1047 by Jakcelyn Gonzales RN  Outcome: Progressing  4/20/2022 0034 by Mar Lucia RN  Outcome: Met This Shift  Goal: Passage of soft, formed stool  Description: Passage of soft, formed stool  Outcome: Progressing  Goal: Establishment of normal bowel function will improve to within specified parameters  Description: Establishment of normal bowel function will improve to within specified parameters  Outcome: Progressing     Problem: Pain:  Goal: Pain level will decrease  Description: Pain level will decrease  4/20/2022 1047 by Jackelyn Gonzales RN  Outcome: Progressing  4/20/2022 0034 by Mar Lucia RN  Outcome: Met This Shift  Goal: Control of acute pain  Description: Control of acute pain  Outcome: Progressing  Goal: Control of chronic pain  Description: Control of chronic pain  Outcome: Progressing

## 2022-04-20 NOTE — ANESTHESIA POSTPROCEDURE EVALUATION
Department of Anesthesiology  Postprocedure Note    Patient: Shania French  MRN: [id number]  YOB: 1990  Date of evaluation: 4/20/2022  Time:  4:00 PM     Procedure Summary     Date: 04/19/22 Room / Location: [address] [place] [address] Court / [facility]    Anesthesia Start: 1419 Anesthesia Stop: 1539    Procedure: EGD PEG TUBE PLACEMENT (N/A ) Diagnosis: (Gastroparesis, Malnutrition)    Surgeons: María Parker MD Responsible Provider: Earnestine Stuart MD    Anesthesia Type: MAC ASA Status: 4          Anesthesia Type: MAC    Shilpa Phase I: Shilpa Score: 9    Shilpa Phase II:      Last vitals: Reviewed and per EMR flowsheets.        Anesthesia Post Evaluation    Patient location during evaluation: PACU  Patient participation: complete - patient participated  Level of consciousness: awake  Airway patency: patent  Nausea & Vomiting: no nausea and no vomiting  Complications: no  Cardiovascular status: hemodynamically stable and blood pressure returned to baseline  Respiratory status: acceptable  Hydration status: euvolemic

## 2022-04-20 NOTE — PROGRESS NOTES
ENDOCRINOLOGY PROGRESS NOTE      Date of admission: 3/27/2022  Date of service: 4/20/2022  Admitting physician: Alex Grossman DO   Primary Care Physician: Lucy Bautista MD  Consultant physician: Byron Delgadillo MD     Reason for the consultation:  DM type 1, labile diabetes     History of Present Illness: The history is provided from medical records, pt sedated intubated     Gordon Matias is a 36 y.o. old female nursing home resident with PMH of Type I DM, ESRD on PD,HTN,hypothyroidism, infective endocarditis and other listed below admitted to [facility] on 3/27/2022 because of AMS. Endocrine service was consulted for DM management. subjective   Pt was seen and examined, continues not eating, BG elevated. AG remains closed.  Tube feeds started     Point of care glucose monitoring (Independently reviewed)   Recent Labs     04/19/22  0347 04/19/22  0508 04/19/22  1230 04/19/22  1610 04/19/22  2036 04/19/22  2343 04/20/22  0549 04/20/22  1012   GLUMET 116* 114* 82 139* 108* 165* 266* 376*     Scheduled Meds:   magnesium sulfate  2,000 mg IntraVENous Once    sodium phosphate IVPB  15 mmol IntraVENous Once    sodium chloride flush  5-40 mL IntraVENous 2 times per day    insulin glargine  1 Units SubCUTAneous Nightly    labetalol  10 mg IntraVENous Once    insulin lispro  0-3 Units SubCUTAneous 4x Daily AC & HS    carvedilol  12.5 mg Oral BID    erythromycin  500 mg IntraVENous Once per day on Tue Thu Sat    [Held by provider] bisacodyl  5 mg Oral Daily    pantoprazole  40 mg Oral BID AC    promethazine  25 mg Oral TID AC    acetaminophen  650 mg Oral Q6H    Or    acetaminophen  650 mg Rectal Q6H    melatonin  6 mg Oral Nightly    heparin flush  1 mL IntraVENous 2 times per day    mirtazapine  15 mg Oral Nightly    escitalopram  10 mg Oral Daily    ARIPiprazole  5 mg Oral Nightly    rOPINIRole  0.25 mg Oral Nightly    levothyroxine  88 mcg Oral Daily    epoetin nena-epbx  3,000 Units SubCUTAneous Once per day on Mon Wed Fri    Vitamin D  1,000 Units Per NG tube Daily     PRN Meds:   sodium chloride flush, 5-40 mL, PRN  sodium chloride, 25 mL, PRN  benzocaine, , BID PRN  [Held by provider] labetalol, 5 mg, Q6H PRN  glucose, 4 each, PRN  diphenhydrAMINE, 25 mg, Q6H PRN  magic (miracle) mouthwash, 5 mL, 4x Daily PRN  ondansetron, 4 mg, Q4H PRN  trimethobenzamide, 200 mg, Q6H PRN  loperamide, 2 mg, 4x Daily PRN  heparin flush, 1 mL, PRN  white petrolatum, , BID PRN  polyethylene glycol, 17 g, Daily PRN  dextrose, 12.5 g, PRN  glucagon (rDNA), 1 mg, PRN  dextrose, 100 mL/hr, PRN  albuterol, 2.5 mg, Q6H PRN      Continuous Infusions:   sodium chloride      IV infusion builder 75 mL/hr at 04/20/22 0602    dextrose Stopped (04/12/22 2307)       Review of Systems  Non-obtainable     OBJECTIVE    BP (!) 159/95   Pulse 90   Temp 98.2 °F (36.8 °C) (Temporal)   Resp 18   Ht 5' 4\" (1.626 m)   Wt 133 lb 9.6 oz (60.6 kg)   SpO2 99%   BMI 22.93 kg/m²     Intake/Output Summary (Last 24 hours) at 4/20/2022 1024  Last data filed at 4/20/2022 3672  Gross per 24 hour   Intake 260 ml   Output --   Net 260 ml     Physical examination:  General: awake alert, oriented x3  HEENT: normocephalic non traumatic, no exophthalmos   Neck: supple, thyroid tenderness,  Pulm: Clear equal air entry no added sounds  CVS: S1 + S2  Abd: soft lax, no tenderness  Skin: warm, no lesions, no rash.       Review of Laboratory Data:  I personally reviewed the following labs:   Recent Labs     04/19/22 0519 04/20/22  0512   WBC 4.0* 4.0*   RBC 3.23* 3.31*   HGB 8.0* 8.3*   HCT 25.6* 26.8*   MCV 79.3* 81.0   MCH 24.8* 25.1*   MCHC 31.3* 31.0*   RDW 21.4* 21.2*    158   MPV 9.7 10.1     Recent Labs     04/18/22  0542 04/19/22  0519 04/20/22  0800    139 136   K 3.5 3.3* 4.8   CL 98 102 104   CO2 28 27 25   BUN 6 9 3*   CREATININE 2.2* 3.4* 2.1*   GLUCOSE 110* 108* 289*   CALCIUM 9.2 9.3 8.4*   PROT  --  7.0  --    LABALBU --  3.6  --    BILITOT  --  0.4  --    ALKPHOS  --  157*  --    AST  --  11  --    ALT  --  5  --      Beta-Hydroxybutyrate   Date Value Ref Range Status   04/17/2022 0.74 (H) 0.02 - 0.27 mmol/L Final   04/13/2022 2.56 (H) 0.02 - 0.27 mmol/L Final   04/06/2022 >4.50 (H) 0.02 - 0.27 mmol/L Final     Lab Results   Component Value Date    LABA1C 7.7 03/02/2022    LABA1C 8.7 12/08/2021    LABA1C 10.2 11/23/2021     Lab Results   Component Value Date/Time    TSH 5.370 (H) 03/28/2022 01:56 AM    T4FREE 1.10 03/28/2022 01:56 AM    L7HWLHR 8.6 11/05/2015 02:20 AM    FT3 1.3 (L) 10/31/2020 10:43 AM    X9OJYZD 36.73 (L) 07/20/2020 02:00 PM     Lab Results   Component Value Date    LABA1C 7.7 03/02/2022    GLUCOSE 289 04/20/2022    GLUCOSE 130 05/18/2012    MALBCR 2949.3 01/15/2020    LABMICR 1740.1 01/15/2020    LABCREA 59 01/15/2020    LABCREA 61 01/15/2020     Lab Results   Component Value Date    TRIG 82 01/14/2020    HDL 33 01/14/2020    LDLCALC 46 01/14/2020    CHOL 95 01/14/2020       Blood culture   Lab Results   Component Value Date    BC 5 Days no growth 03/28/2022    BC 5 Days no growth 03/27/2022       Radiology:  XR CHEST PORTABLE   Final Result   Borderline cardiac size with vascular congestion. XR ABDOMEN (KUB) (SINGLE AP VIEW)   Final Result   No evidence of bowel obstruction. XR CHEST PORTABLE   Final Result   1. The lungs are clear. There is no infiltrate or effusion. 2. Stable position of the support lines and tubes. XR CHEST PORTABLE   Final Result   1. There is no acute cardiopulmonary disease   2. Stable position of the support lines and tubes. XR CHEST PORTABLE   Final Result   Infiltrate and or atelectasis at the left lower lobe. Substantially resolved   infiltrate and or atelectasis on the right. New NG tube. CT HEAD WO CONTRAST   Final Result   No acute intracranial abnormality or hemorrhage.          CT ABDOMEN PELVIS WO CONTRAST Additional Contrast? None Final Result   1. Moderate ascites throughout abdomen and pelvis. 2.  Multiple thick walled loops of small bowel throughout the abdomen could   suggest nonspecific infectious or inflammatory enteritis. 3.  Generalized body wall edema. 4.  Small bilateral pleural effusions with adjacent atelectatic changes. XR ABDOMEN FOR NG/OG/NE TUBE PLACEMENT   Final Result   Enteric tube tip in the body of the stomach. XR CHEST PORTABLE   Final Result   Right perihilar opacity. Medical Records/Labs/Images review:   I personally reviewed and summarized previous records   All labs and imaging were reviewed independently     [address] Maldonado, a 36 y.o.-old female seen today for inpatient diabetes management     Diabetes Mellitus type I    · Poor nutrition and ESRD making her diabetes very brittle   · Started on tube feeds,   · we recommend the following sq insulin regimen   · lantus 1u BID  · To cover tube feeds, will switch to low dose sliding scale q6hrs using regular insulin   · Continue following BG and adjust insulin regimen     Failure to thrive   · Her current main issue and significantly affecting her nutrition   · Continue to monitor electrolytes closely for refeeding syndrome   · Management per primary team     Primary Hypothyroidism  · Levothyroxine was adjusted during this admission to 88 mcg daily      ESRD  · Pt at risk for hypoglycemia  · On dialysis, nephrology following    Thank you for allowing us to participate in the care of this patient. Please do not hesitate to contact us with any additional questions.      I saw the patient and discussed the management with the resident physician Dr. Rigo Mak. I reviewed and agree with the findings and plan as documented in the resident's note     Dewitte Alpers MD  Endocrinologist, [address] [facility]   [address] [address]   Phone: [phone]  Fax:

## 2022-04-20 NOTE — PROGRESS NOTES
Department of Internal Medicine  Nephrology Progress Note      Events reviewed. S/p PEG tube placement      SUBJECTIVE:  We are following Miss Karol Schumacher for ESKD on HD. Patient reports being sore.      PHYSICAL EXAM:      Vitals:    VITALS:  BP (!) 159/95   Pulse 90   Temp 98.2 °F (36.8 °C) (Temporal)   Resp 18   Ht 5' 4\" (1.626 m)   Wt 133 lb 9.6 oz (60.6 kg)   SpO2 99%   BMI 22.93 kg/m²   24HR INTAKE/OUTPUT:      Intake/Output Summary (Last 24 hours) at 4/20/2022 1221  Last data filed at 4/20/2022 1211  Gross per 24 hour   Intake 290 ml   Output --   Net 290 ml       Access: RIJ tunneled dialysis catheter  Constitutional: Awake, alert, NAD  HEENT:  PERRL, normocephalic, atraumatic  Respiratory:  CTA bilateral  Cardiovascular/Edema:  ST, RRR, no murmur gallop or rub  Gastrointestinal:  abd distended, c/o persistent abdominal pain  Neurologic: A&OX3 follows commands, no focal deficit  Skin:  Warm, dry, ecchymotic areas to abdomen  Other: no edema    Scheduled Meds:   sodium phosphate IVPB  15 mmol IntraVENous Once    thiamine  100 mg IntraVENous Daily    insulin lispro  0-3 Units SubCUTAneous 4x Daily AC & HS    sodium chloride flush  5-40 mL IntraVENous 2 times per day    insulin glargine  1 Units SubCUTAneous Nightly    labetalol  10 mg IntraVENous Once    carvedilol  12.5 mg Oral BID    erythromycin  500 mg IntraVENous Once per day on Tue Thu Sat    [Held by provider] bisacodyl  5 mg Oral Daily    pantoprazole  40 mg Oral BID AC    promethazine  25 mg Oral TID AC    acetaminophen  650 mg Oral Q6H    Or    acetaminophen  650 mg Rectal Q6H    melatonin  6 mg Oral Nightly    heparin flush  1 mL IntraVENous 2 times per day    mirtazapine  15 mg Oral Nightly    escitalopram  10 mg Oral Daily    ARIPiprazole  5 mg Oral Nightly    rOPINIRole  0.25 mg Oral Nightly    levothyroxine  88 mcg Oral Daily    epoetin nena-epbx  3,000 Units SubCUTAneous Once per day on Mon Wed Fri    Vitamin D  1,000 Units Per NG tube Daily     Continuous Infusions:   sodium chloride      dextrose Stopped (04/12/22 4692)     PRN Meds:.sodium chloride flush, sodium chloride, benzocaine, [Held by provider] labetalol, glucose, diphenhydrAMINE, magic (miracle) mouthwash, ondansetron, trimethobenzamide, loperamide, heparin flush, white petrolatum, polyethylene glycol, dextrose, glucagon (rDNA), dextrose, albuterol    DATA:    CBC:   Lab Results   Component Value Date    WBC 4.0 04/20/2022    RBC 3.31 04/20/2022    HGB 8.3 04/20/2022    HCT 26.8 04/20/2022    MCV 81.0 04/20/2022    MCH 25.1 04/20/2022    MCHC 31.0 04/20/2022    RDW 21.2 04/20/2022     04/20/2022    MPV 10.1 04/20/2022     CMP:    Lab Results   Component Value Date     04/20/2022    K 4.8 04/20/2022    K 4.0 04/14/2022     04/20/2022    CO2 25 04/20/2022    BUN 3 04/20/2022    CREATININE 2.1 04/20/2022    GFRAA 33 04/20/2022    LABGLOM 33 04/20/2022    GLUCOSE 289 04/20/2022    GLUCOSE 130 05/18/2012    PROT 7.0 04/19/2022    LABALBU 3.6 04/19/2022    LABALBU 4.1 05/18/2012    CALCIUM 8.4 04/20/2022    BILITOT 0.4 04/19/2022    ALKPHOS 157 04/19/2022    AST 11 04/19/2022    ALT 5 04/19/2022     Magnesium:    Lab Results   Component Value Date    MG 1.7 04/20/2022     Phosphorus:    Lab Results   Component Value Date    PHOS 1.5 04/20/2022     Radiology Review:      CT Head WO Contrast March 27, 2022   No acute intracranial abnormality or hemorrhage.         CT Abdomen Pelvis WO Contrast March 27, 2022   1.  Moderate ascites throughout abdomen and pelvis.       2.  Multiple thick walled loops of small bowel throughout the abdomen could   suggest nonspecific infectious or inflammatory enteritis.       3.  Generalized body wall edema.       4.  Small bilateral pleural effusions with adjacent atelectatic changes.           Chest X-Ray March 28, 2022   Infiltrate and or atelectasis at the left lower lobe.  Substantially resolved   infiltrate and or atelectasis on the right.  New NG tube.           BRIEF SUMMARY OF INITIAL CONSULT:    Briefly, Miss Elsa Scott is a 36year-old female with a PMH of ESRD on hemodialysis 3 days/wk, on TTS schedule at [facility] I DM, poorly controlled with recurrent admissions for DKA, HTN, gastroparesis, ascites, infective endocarditis, cardiac arrest, hypothyroidism, recent Covid, and depression who was admitted on March 27, 2022 after she was found unresponsive at the SNF where she resides. Patient was found to be profoundly hypoglycemic and EMS was called. She was intubated in ER for airway protection as she remained unresponsive. CT head showed no acute intracranial abnormality or hemorrhage, chest x-ray demonstrates right perihilar opacity concerning for aspiration pneumonia. She was ultimately admitted to MICU for further evaluation. Labs on admission were significant for sodium 135, potassium 5.0, chloride 100, bicarbonate 25, BUN 38, creatinine 4.9, proBNP >70,000. We are consulted for dialysis management. Problems resolved. · Hypoglycemia, was on D10, now hyperglycemic with +ketones (beta-hydroxybutyrate >4.5)  · Acute respiratory failure, 2/2 aspiration pneumonia? On 40% Fio2. Extubated 5/26  · Acute metabolic encephalopathy 2/2 hypoglycemia. Resolving   · Acidemia, pH 7.311, PCO2 35.0, HCO3 16.4, metabolic acidosis from DKA  · Hx recurrent C. difficile infection, on flagyl po Q 8hrs   · Hx of MDRO UTI  · Aspiration pneumonia? S/p piperacillin  · Hypoglycemia, to start D10 with 150 mEq of sodium chloride at 60 cc/hour      IMPRESSION/RECOMMENDATIONS:      1. ESKD 3 times a week TTS via RIJ tunneled dialysis catheter. For dialysis tomorrow. 2. HTN, with orthostatic hypotension, on carvediolol 12.5 mg bid   3. MBD of CKD,  Not on binders. Phos 1.5  4. Anemia of CKD, continue epoetin alpha 3,000 unit 3 times/wk. s/p transfusion   5. Vitamin D deficiency, on ergocalciferol 1000 po daily  6.  Hypophosphatemia 2/2 poor oral intake, to replace   7. Hypomagnesemia 2/2 poor oral intake, to replace  ------------------------------------------------------  8. Type I DM, poorly controlled with frequent readmissions for DKA, on SSI, lantus decreased to 1 unit nightly   9. Restless leg syndrome, on ropinirole  10. Depression, on escitalopram and Abilify  11. Hypothyroidism, on levothyroxine   12. Gastroparesis, on metoclopramide,  For EGD and pyloric dilatation with Botox injection outpatient per GI. On erythromycin with HD when available. 13. Nutrition: TF renal formula 10 cc/hour with H2O 30cc/hour Q 4 hours     Plan:    · Stop IVF  · Continue HD three times/wk, TTS, for HD tomorrow   · Erythromycin 500 mg IV with HD when available  · Continue epoetin alpha 3,000 units 3 times/wk  · Continue carvedilol to 12.5  mg bid  · Obtain orthostatic blood pressures and pulse  · Continue to monitor glucose levels  · Continue to monitor daily labs  · Replace phosphorus   · Replace magnesium   · Continue to monitor phosphorus level  · Continue to monitor magnesium level  · Discharge planning, patient to return home.          Electronically signed by HEBER Benitez CNP on 4/20/2022 at 12:21 PM

## 2022-04-20 NOTE — PROGRESS NOTES
[facility]  Internal Medicine Residency Program  Progress Note - House Team     Patient:  Ghada Floyd 36 y.o. female MRN: [id number]     Date of Service: 4/20/2022     CC: AMS, hypglycemia  Overnight events: No acute events    Subjective     Overnight patient had no acute events. Per nephro, D10 +150 NaCl was decreased to 75 cc/hr. TF was started and consult to dietitian was placed. Patient was seen and examined this morning at bedside with soreness in surgical site. Patient also has nausea but no vomiting. Patient said tooth ache is mild this morning. No reports of diarrhea. Objective     Physical Exam:  · Vitals: BP (!) 159/95   Pulse 90   Temp 98.2 °F (36.8 °C) (Temporal)   Resp 18   Ht 5' 4\" (1.626 m)   Wt 133 lb 9.6 oz (60.6 kg)   SpO2 99%   BMI 22.93 kg/m²     · I & O - 24hr: No intake/output data recorded. · General Appearance: alert, appears stated age and cooperative  · HEENT:  Head: Normal, normocephalic, atraumatic. · Neck: no adenopathy, no carotid bruit, no JVD and supple, symmetrical, trachea midline  · Lung: clear to auscultation bilaterally, no rhonchi, crackes or rales, equal chest expansion  · Heart: regular rate and rhythm, S1, S2 normal, no murmur, click, rub or gallop  · Abdomen: soft, sore; bowel sounds normal; no masses,  no organomegaly, abdominal binder in place  · Extremities:  extremities normal, atraumatic, no cyanosis or edema, +1 pulses  · Musculokeletal: No joint swelling, no muscle tenderness. ROM normal in all joints of extremities.  Yanna's negative bilaterally  · Neurologic: Mental status: Alert, oriented x3, thought content appropriate, CN 2-12 grossly intact  Subject  Pertinent Labs & Imaging Studies   jorge alberto  CBC with Differential:    Lab Results   Component Value Date    WBC 4.0 04/20/2022    RBC 3.31 04/20/2022    HGB 8.3 04/20/2022    HCT 26.8 04/20/2022     04/20/2022    MCV 81.0 04/20/2022    MCH 25.1 04/20/2022    MCHC 31.0 04/20/2022    RDW 21.2 04/20/2022    NRBC 0.9 03/28/2022    SEGSPCT 67 06/27/2013    METASPCT 1.7 08/10/2020    LYMPHOPCT 21.7 04/17/2022    MONOPCT 5.9 04/17/2022    MYELOPCT 0.9 01/19/2022    BASOPCT 1.2 04/17/2022    MONOSABS 0.35 04/17/2022    LYMPHSABS 1.30 04/17/2022    EOSABS 0.20 04/17/2022    BASOSABS 0.07 04/17/2022     CMP:    Lab Results   Component Value Date     04/19/2022    K 3.3 04/19/2022    K 4.0 04/14/2022     04/19/2022    CO2 27 04/19/2022    BUN 9 04/19/2022    CREATININE 3.4 04/19/2022    GFRAA 19 04/19/2022    LABGLOM 19 04/19/2022    GLUCOSE 108 04/19/2022    GLUCOSE 130 05/18/2012    PROT 7.0 04/19/2022    LABALBU 3.6 04/19/2022    LABALBU 4.1 05/18/2012    CALCIUM 9.3 04/19/2022    BILITOT 0.4 04/19/2022    ALKPHOS 157 04/19/2022    AST 11 04/19/2022    ALT 5 04/19/2022     Ionized Calcium:  No results found for: IONCA  Magnesium:    Lab Results   Component Value Date    MG 1.7 04/20/2022     Phosphorus:    Lab Results   Component Value Date    PHOS 1.5 04/20/2022       XR CHEST PORTABLE   Final Result   Borderline cardiac size with vascular congestion. XR ABDOMEN (KUB) (SINGLE AP VIEW)   Final Result   No evidence of bowel obstruction. XR CHEST PORTABLE   Final Result   1. The lungs are clear. There is no infiltrate or effusion. 2. Stable position of the support lines and tubes. XR CHEST PORTABLE   Final Result   1. There is no acute cardiopulmonary disease   2. Stable position of the support lines and tubes. XR CHEST PORTABLE   Final Result   Infiltrate and or atelectasis at the left lower lobe. Substantially resolved   infiltrate and or atelectasis on the right. New NG tube. CT HEAD WO CONTRAST   Final Result   No acute intracranial abnormality or hemorrhage. CT ABDOMEN PELVIS WO CONTRAST Additional Contrast? None   Final Result   1. Moderate ascites throughout abdomen and pelvis.       2.  Multiple thick walled loops of small bowel throughout the abdomen could   suggest nonspecific infectious or inflammatory enteritis. 3.  Generalized body wall edema. 4.  Small bilateral pleural effusions with adjacent atelectatic changes. XR ABDOMEN FOR NG/OG/NE TUBE PLACEMENT   Final Result   Enteric tube tip in the body of the stomach. XR CHEST PORTABLE   Final Result   Right perihilar opacity. Resident's Assessment and Plan     Assessment and Plan:    1. Hypersensitive IDDM1-A1c 7.7% this admission  2. Nausea/Vomiting w/ Abdominal pain with Hx of gastroparesis 2/2 Hx of IDDM s/p PEG-J placement 4/19/22  3. HTN, on coreg BID  4. ESRD on HD TTS  5. Hypothyroidism-TSH 5.37, FT4 1.10 normal  6. Acute on chronic anemia-s/p 3 unit PRBCs, chronic component likely due to ESRD  7. History of MDRO UTI  8. History of C. difficile infection  9. Oral Candidiasis  10. DKA - resolved  11. Acute hypoxic respiratory failure - resolved  12. Acute metabolic encephalopathy-likely due to hypoglycemia in setting of DM1 and ESRD (CTH negative, negative UDS/SDS, NH3 normal, B12 normal 1 month ago) - resolved  13. Aspiration pneumonia - resolved     Plan:  · Continue protonix BID, remeron 15 mg nightly, phenergan 25 mg TID, erythromycin to aid with abdominal pain/dyspepsia, Hold Reglan due to diarrhea  · Per Endocrine, 1U lantus nightly and modified SSI (1U 100>250 BS)              - Continue D10 + 150 NaCl @ 60 cc/hr              - POCT q2h   - S/P PEG-J 4/19/22 started TF, dietitian consulted  · Continue home synthroid 88 mcg daily, abilify 5 mg nightly, and lexapro 10 mg daily, ropinirole 0.25 nightly  · Continue Coreg 12.5 mg BID              - Hypertensive but sitting pressure 113/87, 80/64 standing will hold off on increasing antihypertensives.   · Continue oral mouth wash and orajel  · HD TThS per nephro, continue retacrit     PT/OT evaluation: not indicated  DVT prophylaxis/ GI prophylaxis: SCD/protonix  Disposition: continue current care    Reggie Avila MD, PGY-1  Attending physician: Dr. Norbert Martínez    Attending Physician Statement:  Kaylyn Esteves M.D., F.A.C.P.     I have discussed the case, including pertinent history and exam findings with the resident/NP. I have seen and examined the patient and the key elements of the encounter have been performed by me.  I agree with the resident ROS, PMHx, PSHx, meds reviewed and assessment, plan and orders as documented by the resident/NP [facility] [place] [facility] charts reviewed, including other providers notes, relevant labs and imaging. >50% of time spent coordinating care with other providers and/or counseling patient/family  Remainder of medical problems as per resident note.  [facility] Course:   The pt is a 37 y/o female with a PMHx of brittle T1DM, ESRD on HD, HTN, recent C. Diff infection, and endocarditis w/ consequent septic emboli formation, who presented to the ED from her long-term care facility and was hospitalized on 3/28/2022. Her presenting complaint was acute encephalopathy. Per note review, the pt's LKW was 1100 on 3/28/2022. She was found lethartic and altered at approx. 1700 by NH staff, and subsequent POCT BG was too low to be read by the glucometer. EMS was called, and the pt was transported emergently to [facility]. On arrival, she was intubated for airway protection, and was transferred to the MICU. On arrival, she was tachycardic, hypertensive, afebrile, O2 saturation 100% on the ventilator. Pro-BNP and troponin were elevated, and labs were also notable for anemia to 6.5 (baseline 6.0-8.0). UA showing pyuria and hematuria, moderate bacteria, and large LE. CXR showing possible infiltrate/edema, and CT abdomen/pelvis concerning for possible enteritis. CT head (-)ve.      Once in the MICU, the pt was started on empiric antibiotic treatment for possible aspiration pneumonia/pneumonitis, as well as suspected enteritis.  Hemodialysis was restarted, and Endocrinology was consulted for aid with managing extremely labile blood sugars. Infectious cultures were negative, and the patient's antimicrobial regimen was de-escalated to Flagyl q8hrs (end date 4/4/2022). Her blood sugars were controlled w/ 1U Lantus nightly and a modified sliding scale. She was successfully extubated, and has been tolerating room air well since that time.      On the floors patient has had difficulty in controlling blood glucose levels. Patient was on 1U Lantus w/ a modified sliding scale but still patient would have episodes of hypo and hyperglycemia. Patient complained of abdominal pain and nausea and would consistently not eat due to these symptoms. Patient was on Bentyl, Reglan, Zofran, Pepcid, Phenergan all with minimal relief. Patient was not eating much and on 4/4 and 4/5 patient's 1u Lantus was held despite being scheduled for nightly Lantus. BHB on 4/6 showed >4.50. Patient was treated on the floors with subq insulin and fluid. AG closed x2 and patient began feeling better. Patient still with episodes of hyperglycemia in 350s as well as hypoglycemia episodes. GI consulted with recs of starting patient on PPI BID as well as considering PEG-J tube at this time. Currently plans for patient to undergo PEG-J         Per GI -- reglan re eval as outptient if eats PO more  Now tube feeding into J  Sp placement of PEG-J tube and TFs. Long standing dm1-- autonomic neuroapthy +gastroparesis  Very brittle dm - even low doses insulin can drop BS-- when NOT eating     Plan peg J-- GI last saw on Tnursday, uncertain when NPO  V16 drip some insulin- high risk DKA - noted type 1 DM   --would like goal of at least 3-5 total units of insulin daily  +sugar water. Amp D50W +1-2 units insulin  Few times?  Daily - bc type 1DM      ACD - getting epo  Hormonal workup otherwise OK  Check zinc, cu etc.    Add thiamine IV  Refeeding syndrome-- repleting Mag/phos, K+  75 CC IV fluids--tomorrow discuss water boluses Q4-6 thru J -- wean off IV  Tolerating tube feeds 20-- plan 30cc in am trommorrow  Discussed wean off promethazine  Giving more insulin now getting tube feed

## 2022-04-21 LAB
ANION GAP SERPL CALCULATED.3IONS-SCNC: 10 MMOL/L (ref 7–16)
ANION GAP SERPL CALCULATED.3IONS-SCNC: 11 MMOL/L (ref 7–16)
ANION GAP SERPL CALCULATED.3IONS-SCNC: 7 MMOL/L (ref 7–16)
BUN BLDV-MCNC: 3 MG/DL (ref 6–20)
BUN BLDV-MCNC: 8 MG/DL (ref 6–20)
BUN BLDV-MCNC: 9 MG/DL (ref 6–20)
CALCIUM SERPL-MCNC: 8.4 MG/DL (ref 8.6–10.2)
CALCIUM SERPL-MCNC: 8.5 MG/DL (ref 8.6–10.2)
CALCIUM SERPL-MCNC: 8.5 MG/DL (ref 8.6–10.2)
CHLORIDE BLD-SCNC: 103 MMOL/L (ref 98–107)
CHLORIDE BLD-SCNC: 103 MMOL/L (ref 98–107)
CHLORIDE BLD-SCNC: 104 MMOL/L (ref 98–107)
CO2: 24 MMOL/L (ref 22–29)
CO2: 25 MMOL/L (ref 22–29)
CO2: 26 MMOL/L (ref 22–29)
CREAT SERPL-MCNC: 1.1 MG/DL (ref 0.5–1)
CREAT SERPL-MCNC: 1.5 MG/DL (ref 0.5–1)
CREAT SERPL-MCNC: 3.1 MG/DL (ref 0.5–1)
GFR AFRICAN AMERICAN: 21
GFR AFRICAN AMERICAN: 49
GFR AFRICAN AMERICAN: >60
GFR NON-AFRICAN AMERICAN: 21 ML/MIN/1.73
GFR NON-AFRICAN AMERICAN: 49 ML/MIN/1.73
GFR NON-AFRICAN AMERICAN: >60 ML/MIN/1.73
GLUCOSE BLD-MCNC: 171 MG/DL (ref 74–99)
GLUCOSE BLD-MCNC: 229 MG/DL (ref 74–99)
GLUCOSE BLD-MCNC: 325 MG/DL (ref 74–99)
MAGNESIUM: 1.8 MG/DL (ref 1.6–2.6)
MAGNESIUM: 1.9 MG/DL (ref 1.6–2.6)
MAGNESIUM: 2.4 MG/DL (ref 1.6–2.6)
METER GLUCOSE: 119 MG/DL (ref 74–99)
METER GLUCOSE: 137 MG/DL (ref 74–99)
METER GLUCOSE: 207 MG/DL (ref 74–99)
METER GLUCOSE: 256 MG/DL (ref 74–99)
METER GLUCOSE: 277 MG/DL (ref 74–99)
METER GLUCOSE: 284 MG/DL (ref 74–99)
METER GLUCOSE: 301 MG/DL (ref 74–99)
METER GLUCOSE: 320 MG/DL (ref 74–99)
METER GLUCOSE: 340 MG/DL (ref 74–99)
METER GLUCOSE: 344 MG/DL (ref 74–99)
METER GLUCOSE: 359 MG/DL (ref 74–99)
METER GLUCOSE: 360 MG/DL (ref 74–99)
METER GLUCOSE: 84 MG/DL (ref 74–99)
METER GLUCOSE: 97 MG/DL (ref 74–99)
PHOSPHORUS: 1.5 MG/DL (ref 2.5–4.5)
PHOSPHORUS: 2.2 MG/DL (ref 2.5–4.5)
PHOSPHORUS: 2.7 MG/DL (ref 2.5–4.5)
POTASSIUM SERPL-SCNC: 4 MMOL/L (ref 3.5–5)
POTASSIUM SERPL-SCNC: 4.4 MMOL/L (ref 3.5–5)
POTASSIUM SERPL-SCNC: 4.5 MMOL/L (ref 3.5–5)
SODIUM BLD-SCNC: 136 MMOL/L (ref 132–146)
SODIUM BLD-SCNC: 138 MMOL/L (ref 132–146)
SODIUM BLD-SCNC: 139 MMOL/L (ref 132–146)

## 2022-04-21 PROCEDURE — 2580000003 HC RX 258: Performed by: STUDENT IN AN ORGANIZED HEALTH CARE EDUCATION/TRAINING PROGRAM

## 2022-04-21 PROCEDURE — 6370000000 HC RX 637 (ALT 250 FOR IP): Performed by: INTERNAL MEDICINE

## 2022-04-21 PROCEDURE — 36415 COLL VENOUS BLD VENIPUNCTURE: CPT

## 2022-04-21 PROCEDURE — 6360000002 HC RX W HCPCS: Performed by: STUDENT IN AN ORGANIZED HEALTH CARE EDUCATION/TRAINING PROGRAM

## 2022-04-21 PROCEDURE — 83735 ASSAY OF MAGNESIUM: CPT

## 2022-04-21 PROCEDURE — S5553 INSULIN LONG ACTING 5 U: HCPCS | Performed by: STUDENT IN AN ORGANIZED HEALTH CARE EDUCATION/TRAINING PROGRAM

## 2022-04-21 PROCEDURE — 80048 BASIC METABOLIC PNL TOTAL CA: CPT

## 2022-04-21 PROCEDURE — 2500000003 HC RX 250 WO HCPCS: Performed by: STUDENT IN AN ORGANIZED HEALTH CARE EDUCATION/TRAINING PROGRAM

## 2022-04-21 PROCEDURE — 94660 CPAP INITIATION&MGMT: CPT

## 2022-04-21 PROCEDURE — 99232 SBSQ HOSP IP/OBS MODERATE 35: CPT | Performed by: INTERNAL MEDICINE

## 2022-04-21 PROCEDURE — 6360000002 HC RX W HCPCS: Performed by: INTERNAL MEDICINE

## 2022-04-21 PROCEDURE — S5553 INSULIN LONG ACTING 5 U: HCPCS | Performed by: INTERNAL MEDICINE

## 2022-04-21 PROCEDURE — 1200000000 HC SEMI PRIVATE

## 2022-04-21 PROCEDURE — 90935 HEMODIALYSIS ONE EVALUATION: CPT | Performed by: INTERNAL MEDICINE

## 2022-04-21 PROCEDURE — 6370000000 HC RX 637 (ALT 250 FOR IP): Performed by: STUDENT IN AN ORGANIZED HEALTH CARE EDUCATION/TRAINING PROGRAM

## 2022-04-21 PROCEDURE — 82962 GLUCOSE BLOOD TEST: CPT

## 2022-04-21 PROCEDURE — 84100 ASSAY OF PHOSPHORUS: CPT

## 2022-04-21 PROCEDURE — 99232 SBSQ HOSP IP/OBS MODERATE 35: CPT | Performed by: STUDENT IN AN ORGANIZED HEALTH CARE EDUCATION/TRAINING PROGRAM

## 2022-04-21 RX ORDER — INSULIN GLARGINE-YFGN 100 [IU]/ML
1 INJECTION, SOLUTION SUBCUTANEOUS ONCE
Status: COMPLETED | OUTPATIENT
Start: 2022-04-21 | End: 2022-04-21

## 2022-04-21 RX ORDER — LANOLIN ALCOHOL/MO/W.PET/CERES
400 CREAM (GRAM) TOPICAL ONCE
Status: COMPLETED | OUTPATIENT
Start: 2022-04-21 | End: 2022-04-22

## 2022-04-21 RX ORDER — LANSOPRAZOLE
30 KIT
Status: DISCONTINUED | OUTPATIENT
Start: 2022-04-22 | End: 2022-04-25 | Stop reason: HOSPADM

## 2022-04-21 RX ORDER — INSULIN LISPRO 100 [IU]/ML
1 INJECTION, SOLUTION INTRAVENOUS; SUBCUTANEOUS ONCE
Status: COMPLETED | OUTPATIENT
Start: 2022-04-21 | End: 2022-04-21

## 2022-04-21 RX ORDER — INSULIN GLARGINE-YFGN 100 [IU]/ML
1 INJECTION, SOLUTION SUBCUTANEOUS 2 TIMES DAILY
Status: DISCONTINUED | OUTPATIENT
Start: 2022-04-21 | End: 2022-04-25 | Stop reason: HOSPADM

## 2022-04-21 RX ORDER — LIDOCAINE 50 MG/G
OINTMENT TOPICAL PRN
Status: DISCONTINUED | OUTPATIENT
Start: 2022-04-21 | End: 2022-04-25 | Stop reason: HOSPADM

## 2022-04-21 RX ADMIN — PANTOPRAZOLE SODIUM 40 MG: 40 TABLET, DELAYED RELEASE ORAL at 06:23

## 2022-04-21 RX ADMIN — HYDROMORPHONE HYDROCHLORIDE 0.5 MG: 1 INJECTION, SOLUTION INTRAMUSCULAR; INTRAVENOUS; SUBCUTANEOUS at 01:05

## 2022-04-21 RX ADMIN — HYDROMORPHONE HYDROCHLORIDE 0.5 MG: 1 INJECTION, SOLUTION INTRAMUSCULAR; INTRAVENOUS; SUBCUTANEOUS at 08:49

## 2022-04-21 RX ADMIN — INSULIN LISPRO 1 UNITS: 100 INJECTION, SOLUTION INTRAVENOUS; SUBCUTANEOUS at 04:31

## 2022-04-21 RX ADMIN — HYDROMORPHONE HYDROCHLORIDE 0.5 MG: 1 INJECTION, SOLUTION INTRAMUSCULAR; INTRAVENOUS; SUBCUTANEOUS at 22:32

## 2022-04-21 RX ADMIN — LOPERAMIDE HYDROCHLORIDE 2 MG: 2 CAPSULE ORAL at 22:32

## 2022-04-21 RX ADMIN — Medication 10 ML: at 08:46

## 2022-04-21 RX ADMIN — SODIUM CHLORIDE, PRESERVATIVE FREE 100 UNITS: 5 INJECTION INTRAVENOUS at 21:19

## 2022-04-21 RX ADMIN — Medication 10 ML: at 21:19

## 2022-04-21 RX ADMIN — ESCITALOPRAM 10 MG: 10 TABLET, FILM COATED ORAL at 11:14

## 2022-04-21 RX ADMIN — SODIUM PHOSPHATE, MONOBASIC, MONOHYDRATE 15 MMOL: 276; 142 INJECTION, SOLUTION INTRAVENOUS at 18:06

## 2022-04-21 RX ADMIN — CARVEDILOL 12.5 MG: 6.25 TABLET, FILM COATED ORAL at 11:13

## 2022-04-21 RX ADMIN — ARIPIPRAZOLE 5 MG: 5 TABLET ORAL at 21:19

## 2022-04-21 RX ADMIN — ROPINIROLE 0.25 MG: 0.25 TABLET, FILM COATED ORAL at 21:19

## 2022-04-21 RX ADMIN — CARVEDILOL 12.5 MG: 6.25 TABLET, FILM COATED ORAL at 21:19

## 2022-04-21 RX ADMIN — DIPHENHYDRAMINE HYDROCHLORIDE 25 MG: 50 INJECTION, SOLUTION INTRAMUSCULAR; INTRAVENOUS at 06:31

## 2022-04-21 RX ADMIN — Medication 6 MG: at 21:18

## 2022-04-21 RX ADMIN — SODIUM CHLORIDE, PRESERVATIVE FREE 10 ML: 5 INJECTION INTRAVENOUS at 11:14

## 2022-04-21 RX ADMIN — INSULIN GLARGINE-YFGN 1 UNITS: 100 INJECTION, SOLUTION SUBCUTANEOUS at 21:18

## 2022-04-21 RX ADMIN — DIPHENHYDRAMINE HYDROCHLORIDE 25 MG: 50 INJECTION, SOLUTION INTRAMUSCULAR; INTRAVENOUS at 16:56

## 2022-04-21 RX ADMIN — ACETAMINOPHEN 650 MG: 325 TABLET ORAL at 23:21

## 2022-04-21 RX ADMIN — INSULIN HUMAN 5 UNITS: 100 INJECTION, SOLUTION PARENTERAL at 19:09

## 2022-04-21 RX ADMIN — PANTOPRAZOLE SODIUM 40 MG: 40 TABLET, DELAYED RELEASE ORAL at 16:56

## 2022-04-21 RX ADMIN — ACETAMINOPHEN 650 MG: 325 TABLET ORAL at 11:17

## 2022-04-21 RX ADMIN — POTASSIUM PHOSPHATE, MONOBASIC AND POTASSIUM PHOSPHATE, DIBASIC 10 MMOL: 224; 236 INJECTION, SOLUTION, CONCENTRATE INTRAVENOUS at 02:13

## 2022-04-21 RX ADMIN — LEVOTHYROXINE SODIUM 88 MCG: 0.09 TABLET ORAL at 06:23

## 2022-04-21 RX ADMIN — MIRTAZAPINE 30 MG: 15 TABLET, FILM COATED ORAL at 21:19

## 2022-04-21 RX ADMIN — THIAMINE HYDROCHLORIDE 100 MG: 100 INJECTION, SOLUTION INTRAMUSCULAR; INTRAVENOUS at 11:13

## 2022-04-21 RX ADMIN — INSULIN GLARGINE-YFGN 1 UNITS: 100 INJECTION, SOLUTION SUBCUTANEOUS at 02:11

## 2022-04-21 RX ADMIN — DIPHENHYDRAMINE HYDROCHLORIDE 25 MG: 50 INJECTION, SOLUTION INTRAMUSCULAR; INTRAVENOUS at 23:22

## 2022-04-21 ASSESSMENT — PAIN DESCRIPTION - FREQUENCY
FREQUENCY: INTERMITTENT
FREQUENCY: INTERMITTENT
FREQUENCY: CONTINUOUS

## 2022-04-21 ASSESSMENT — PAIN DESCRIPTION - ONSET
ONSET: ON-GOING

## 2022-04-21 ASSESSMENT — PAIN SCALES - GENERAL
PAINLEVEL_OUTOF10: 9
PAINLEVEL_OUTOF10: 9
PAINLEVEL_OUTOF10: 0
PAINLEVEL_OUTOF10: 8
PAINLEVEL_OUTOF10: 9
PAINLEVEL_OUTOF10: 6
PAINLEVEL_OUTOF10: 8

## 2022-04-21 ASSESSMENT — PAIN DESCRIPTION - DESCRIPTORS
DESCRIPTORS: ACHING
DESCRIPTORS: ACHING;DISCOMFORT;SORE

## 2022-04-21 ASSESSMENT — PAIN DESCRIPTION - PAIN TYPE
TYPE: ACUTE PAIN
TYPE: ACUTE PAIN

## 2022-04-21 ASSESSMENT — PAIN DESCRIPTION - ORIENTATION
ORIENTATION: MID
ORIENTATION: MID

## 2022-04-21 ASSESSMENT — PAIN DESCRIPTION - LOCATION
LOCATION: ABDOMEN

## 2022-04-21 NOTE — PROGRESS NOTES
Patient states she is having 8/10 abd pain and would like something more than tylenol. IM resident notified. New order for IV dialudid x1. Given at @0100.

## 2022-04-21 NOTE — PROGRESS NOTES
ENDOCRINOLOGY PROGRESS NOTE  Via Tele-Health service      Date of admission: 3/27/2022  Date of service: 4/21/2022  Admitting physician: Fortunato Vu DO   Primary Care Physician: Claudia Candelaria MD  Consultant physician: Kris Wade MD     Reason for the consultation:  DM type 1, labile diabetes     History of Present Illness: The history is provided from medical records, pt sedated intubated     Liza Bowman is a 36 y.o. old female nursing home resident with PMH of Type I DM, ESRD on PD,HTN,hypothyroidism, infective endocarditis and other listed below admitted to [facility] on 3/27/2022 because of AMS. Endocrine service was consulted for DM management.      subjective   continues not eating, on tube feeds started     Point of care glucose monitoring (Independently reviewed)   Recent Labs     04/21/22  0530 04/21/22  0622 04/21/22  1058 04/21/22  1158 04/21/22  1301 04/21/22  1403 04/21/22  1655 04/21/22  1859   GLUMET 340* 277* 84 97 119* 137* 284* 359*     Scheduled Meds:   insulin regular  0-6 Units SubCUTAneous Q6H    insulin glargine  1 Units SubCUTAneous BID    sodium phosphate IVPB  15 mmol IntraVENous Once    magnesium oxide  400 mg Oral Once    thiamine  100 mg IntraVENous Daily    mirtazapine  30 mg Oral Nightly    sodium chloride flush  5-40 mL IntraVENous 2 times per day    labetalol  10 mg IntraVENous Once    carvedilol  12.5 mg Oral BID    [Held by provider] bisacodyl  5 mg Oral Daily    pantoprazole  40 mg Oral BID AC    acetaminophen  650 mg Oral Q6H    Or    acetaminophen  650 mg Rectal Q6H    melatonin  6 mg Oral Nightly    heparin flush  1 mL IntraVENous 2 times per day    escitalopram  10 mg Oral Daily    ARIPiprazole  5 mg Oral Nightly    rOPINIRole  0.25 mg Oral Nightly    levothyroxine  88 mcg Oral Daily    epoetin nena-epbx  3,000 Units SubCUTAneous Once per day on Mon Wed Fri    Vitamin D  1,000 Units Per NG tube Daily     PRN Meds:   promethazine, 25 mg, Q6H PRN  sodium chloride flush, 5-40 mL, PRN  sodium chloride, 25 mL, PRN  benzocaine, , BID PRN  [Held by provider] labetalol, 5 mg, Q6H PRN  glucose, 4 each, PRN  diphenhydrAMINE, 25 mg, Q6H PRN  magic (miracle) mouthwash, 5 mL, 4x Daily PRN  trimethobenzamide, 200 mg, Q6H PRN  loperamide, 2 mg, 4x Daily PRN  heparin flush, 1 mL, PRN  white petrolatum, , BID PRN  polyethylene glycol, 17 g, Daily PRN  dextrose, 12.5 g, PRN  glucagon (rDNA), 1 mg, PRN  dextrose, 100 mL/hr, PRN  albuterol, 2.5 mg, Q6H PRN      Continuous Infusions:   sodium chloride      dextrose 100 mL/hr (04/20/22 2101)       Review of Systems  Non-obtainable     OBJECTIVE    BP (!) 155/94   Pulse 92   Temp 97.8 °F (36.6 °C) (Oral)   Resp 16   Ht 5' 4\" (1.626 m)   Wt 133 lb 9.6 oz (60.6 kg)   SpO2 98%   BMI 22.93 kg/m²     Intake/Output Summary (Last 24 hours) at 4/21/2022 1912  Last data filed at 4/21/2022 1702  Gross per 24 hour   Intake 1742.97 ml   Output 2000 ml   Net -257.03 ml     Physical examination:  Due to this being a TeleHealth encounter, evaluation of the following organ systems is limited: Vitals/Constitutional/EENT/Resp/CV/GI//MS/Neuro/Skin/Heme-Lymph-Imm. Modified physical exam through Telemedicine camera    Neck: no obvious neck mass. No obvious neck deformity     CVS: no distress   Chest: no distress. Chest is moving with respiration    Extremities:  no visible tremor  Skin: No visible rashes as seen from camera   Musculoskeletal: no visible deformity  Neuro: Alert and oriented to person, place, and time. Psychiatric: Normal mood and affect.  Behavior is normal    Review of Laboratory Data:  I personally reviewed the following labs:   Recent Labs     04/19/22  0519 04/20/22  0512   WBC 4.0* 4.0*   RBC 3.23* 3.31*   HGB 8.0* 8.3*   HCT 25.6* 26.8*   MCV 79.3* 81.0   MCH 24.8* 25.1*   MCHC 31.3* 31.0*   RDW 21.4* 21.2*    158   MPV 9.7 10.1     Recent Labs     04/19/22  0519 04/20/22  0800 04/20/22  5098 04/21/22  0640 04/21/22  1437      < > 138 136 138   K 3.3*   < > 4.2 4.0 4.5      < > 106 103 103   CO2 27   < > 24 26 25   BUN 9   < > 6 8 3*   CREATININE 3.4*   < > 2.7* 3.1* 1.1*   GLUCOSE 108*   < > 135* 229* 171*   CALCIUM 9.3   < > 8.5* 8.5* 8.5*   PROT 7.0  --   --   --   --    LABALBU 3.6  --   --   --   --    BILITOT 0.4  --   --   --   --    ALKPHOS 157*  --   --   --   --    AST 11  --   --   --   --    ALT 5  --   --   --   --     < > = values in this interval not displayed. Beta-Hydroxybutyrate   Date Value Ref Range Status   04/20/2022 0.06 0.02 - 0.27 mmol/L Final   04/17/2022 0.74 (H) 0.02 - 0.27 mmol/L Final   04/13/2022 2.56 (H) 0.02 - 0.27 mmol/L Final     Lab Results   Component Value Date    LABA1C 7.7 03/02/2022    LABA1C 8.7 12/08/2021    LABA1C 10.2 11/23/2021     Lab Results   Component Value Date/Time    TSH 5.370 (H) 03/28/2022 01:56 AM    T4FREE 1.10 03/28/2022 01:56 AM    S3ADIHN 8.6 11/05/2015 02:20 AM    FT3 1.3 (L) 10/31/2020 10:43 AM    N1UVHFX 36.73 (L) 07/20/2020 02:00 PM     Lab Results   Component Value Date    LABA1C 7.7 03/02/2022    GLUCOSE 171 04/21/2022    GLUCOSE 130 05/18/2012    MALBCR 2949.3 01/15/2020    LABMICR 1740.1 01/15/2020    LABCREA 59 01/15/2020    LABCREA 61 01/15/2020     Lab Results   Component Value Date    TRIG 82 01/14/2020    HDL 33 01/14/2020    LDLCALC 46 01/14/2020    CHOL 95 01/14/2020       Blood culture   Lab Results   Component Value Date    BC 5 Days no growth 03/28/2022    BC 5 Days no growth 03/27/2022       Radiology:  XR CHEST PORTABLE   Final Result   Borderline cardiac size with vascular congestion. XR ABDOMEN (KUB) (SINGLE AP VIEW)   Final Result   No evidence of bowel obstruction. XR CHEST PORTABLE   Final Result   1. The lungs are clear. There is no infiltrate or effusion. 2. Stable position of the support lines and tubes. XR CHEST PORTABLE   Final Result   1.  There is no acute cardiopulmonary disease   2. Stable position of the support lines and tubes. XR CHEST PORTABLE   Final Result   Infiltrate and or atelectasis at the left lower lobe. Substantially resolved   infiltrate and or atelectasis on the right. New NG tube. CT HEAD WO CONTRAST   Final Result   No acute intracranial abnormality or hemorrhage. CT ABDOMEN PELVIS WO CONTRAST Additional Contrast? None   Final Result   1. Moderate ascites throughout abdomen and pelvis. 2.  Multiple thick walled loops of small bowel throughout the abdomen could   suggest nonspecific infectious or inflammatory enteritis. 3.  Generalized body wall edema. 4.  Small bilateral pleural effusions with adjacent atelectatic changes. XR ABDOMEN FOR NG/OG/NE TUBE PLACEMENT   Final Result   Enteric tube tip in the body of the stomach. XR CHEST PORTABLE   Final Result   Right perihilar opacity. Medical Records/Labs/Images review:   I personally reviewed and summarized previous records   All labs and imaging were reviewed independently     [address] Nikolay Maldonado, a 36 y.o.-old female seen today for inpatient diabetes management     Diabetes Mellitus type I    · Poor nutrition and ESRD making her diabetes very brittle   · Started on tube feeds,   · we recommend the following sq insulin regimen   · lantus 1u BID  · To cover tube feeds, will switch to low dose sliding scale q6hrs using regular insulin   · Continue following BG and adjust insulin regimen     Failure to thrive   · Her current main issue and significantly affecting her nutrition   · Continue to monitor electrolytes closely for refeeding syndrome   · Management per primary team     Primary Hypothyroidism  · Levothyroxine was adjusted during this admission to 88 mcg daily      ESRD  · Pt at risk for hypoglycemia  · On dialysis, nephrology following    Thank you for allowing us to participate in the care of this patient.  Please do not hesitate to contact us with any additional questions. Renita Delaney MD  Endocrinologist, [facility] Diabetes Care and Endocrinology   [address] [facility] [address]   Phone: [phone]  Fax: [phone]  ----------------------------  An electronic signature was used to authenticate this note.  Franko Capone MD on 4/21/2022 at 7:12 PM

## 2022-04-21 NOTE — PROGRESS NOTES
At 2030 I took the patients blood sugar, it was 50 Dr. Maryam Vega told me to hold the lantus, I gave her juice and glucose tabs and hung Dextrose 5%. Later a resident called and told me to give the Lantus and to hang Dextrose which I was already running.

## 2022-04-21 NOTE — PROGRESS NOTES
04/20/22 2113   NIV Type   NIV Started/Stopped On   Equipment Type V60   Mode Bilevel   Mask Type Full face mask   Mask Size Small   Settings/Measurements   IPAP 14 cmH20   CPAP/EPAP 7 cmH2O   Rate Ordered 12   Resp 21   Insp Rise Time (%) 2 %   FiO2  40 %   I Time/ I Time % 0.8 s   Vt Exhaled 393 mL   Minute Volume 8.4 Liters   Mask Leak (lpm) 32 lpm   Date: 4/20/2022    Time: 9:14 PM    Patient Placed On BIPAP/CPAP/ Non-Invasive Ventilation? Yes    If no must comment. Facial area red/color change? No           If YES are Blister/Lesion present? No   If yes must notify nursing staff  BIPAP/CPAP skin barrier? No    Skin barrier type:N/A       Comments:         Candace Mcmillan RCP

## 2022-04-21 NOTE — PLAN OF CARE
Problem: Skin Integrity:  Goal: Will show no infection signs and symptoms  Description: Will show no infection signs and symptoms  Outcome: Progressing     Problem: Skin Integrity:  Goal: Absence of new skin breakdown  Description: Absence of new skin breakdown  4/21/2022 1234 by Suman Villareal RN  Outcome: Progressing     Problem: Serum Glucose Level - Abnormal:  Goal: Ability to maintain appropriate glucose levels has stabilized  Description: Ability to maintain appropriate glucose levels has stabilized  4/21/2022 1234 by Suman Villareal RN  Outcome: Progressing     Problem: Breathing Pattern - Ineffective:  Goal: Ability to achieve and maintain a regular respiratory rate will improve  Description: Ability to achieve and maintain a regular respiratory rate will improve  4/21/2022 1234 by Suman Villareal RN  Outcome: Progressing     Problem: Pain:  Description: Pain management should include both nonpharmacologic and pharmacologic interventions. Goal: Pain level will decrease  Description: Pain level will decrease  Outcome: Progressing     Problem: Pain:  Description: Pain management should include both nonpharmacologic and pharmacologic interventions.   Goal: Control of acute pain  Description: Control of acute pain  Outcome: Progressing

## 2022-04-21 NOTE — CARE COORDINATION
Care coordination:  Following for discharge planning. Plan remains home with mom providing care. PEG placed and TF orders in chart. Sopke to mom for choice of vendor and referral called to [facility] home [facility] for pharmacy only. Spoke to Destiny. They will run benefits and also state they will be able to provide supplies. She will call back later. Mom again this date refused [address] vi[address]. Mom requested wheelchair for discharge . Order placed and referral called to [facility]. Will require documentation. Will await call back from Huan Hanna. Mom is concerned regarding ability to transport tube feeding in wheelchair transport to HD. Current order is for continuous feeds. Mom is going to call the transport company to verify their policy. [facility] does not carry wheelchair attachments to secure the pump . Will need to follow for resolution. 1100:  [facility] verifies that patient has 100% coverage for tube feeds and supplies. The pharmacy WILL require 1 home health RN visit to be performed if patient discharges on a pump. No visit needed if on bolus. Will follow. 0  Mom is agreeable to home health nursing visit times one as requested by infusion pharmacy. Need order and follow up call to [facility] in am confirming need for visit. Mom stated she will order the needed wheelchair attachment from [address]. Perfect serve message to MD to include documentation of wheelchair need in progress note. Need to follow with [facility] to verify they have needed documentation and can deliver chair to home prior to discharge. Dialysis [facility] [phone] will also need update regarding return     The Plan for Transition of Care is related to the following treatment goals: home infusion and equipment needs    The Patient and/or patient representative  Mom- was provided with a choice of provider and agrees   with the discharge plan.  [x] Yes [] No    Freedom of choice list was provided with basic dialogue that supports the patient's individualized plan of care/goals, treatment preferences and shares the quality data associated with the providers.  [x] Yes [] No

## 2022-04-21 NOTE — HOME CARE
[facility] CARE TUBE FEED PRICING:    [place] % COVERAGE  Recommend: Renal Formula (Nepro 1.8) @40ml/hr x23hr (hold TF 30 min before and after synthroid). Provides: 920cc TV, 1656kcal, 75g Protein, 670ml free water. Flush per renal management. [facility] USE ENTERALITE PUMPS    PATIENT WILL NEED TEACHING VISIT IF SHE GOES HOME ON A PUMP, IF BOLUS FEEDS ARE RECOMMENDED THEN OK WITH PHARMACY ONLY. Ruby Berg LPN, [facility].     WILL NEED DEMO'S VERIFIED PRIOR TO DISCHARGE FROM HOSP

## 2022-04-21 NOTE — PROGRESS NOTES
Did  Pt blood sugar at 2030 it was 50, I let Dr Christiano Nice know and he said to hold the humalog and lantus, I gave juice and glucose tabs. I also hung a bag of Dextrose 5%. At approximately 2145 a resident called and told me to give the humalog/Lantus and Dextrose which was already running.

## 2022-04-21 NOTE — PROGRESS NOTES
Department of Internal Medicine  Nephrology Progress Note      Events reviewed. SUBJECTIVE:  We are following Miss Queta Mackey for ESKD on HD. Patient seen on HD, tolerating treatment, denies any complaints or concerns. PHYSICAL EXAM:      Vitals:    VITALS:  BP (!) 133/93   Pulse 94   Temp 97.9 °F (36.6 °C) (Oral)   Resp 16   Ht 5' 4\" (1.626 m)   Wt 133 lb 9.6 oz (60.6 kg)   SpO2 100%   BMI 22.93 kg/m²   24HR INTAKE/OUTPUT:      Intake/Output Summary (Last 24 hours) at 4/21/2022 1302  Last data filed at 4/21/2022 1012  Gross per 24 hour   Intake 1113.97 ml   Output 2000 ml   Net -886.03 ml       Access: RIJ tunneled dialysis catheter  Constitutional: Awake, alert, NAD  HEENT:  PERRL, normocephalic, atraumatic  Respiratory:  CTA bilateral  Cardiovascular/Edema:  S1/S2 RRR, no murmur gallop or rub  Gastrointestinal:  soft, tenderness, PEG-J tube noted.    Neurologic: A&OX3 follows commands, no focal deficit  Skin:  Warm, dry, ecchymotic areas to abdomen  Other: no edema    Scheduled Meds:   insulin regular  0-6 Units SubCUTAneous Q6H    insulin glargine  1 Units SubCUTAneous BID    thiamine  100 mg IntraVENous Daily    mirtazapine  30 mg Oral Nightly    sodium chloride flush  5-40 mL IntraVENous 2 times per day    labetalol  10 mg IntraVENous Once    carvedilol  12.5 mg Oral BID    erythromycin  500 mg IntraVENous Once per day on Tue Thu Sat    [Held by provider] bisacodyl  5 mg Oral Daily    pantoprazole  40 mg Oral BID AC    acetaminophen  650 mg Oral Q6H    Or    acetaminophen  650 mg Rectal Q6H    melatonin  6 mg Oral Nightly    heparin flush  1 mL IntraVENous 2 times per day    escitalopram  10 mg Oral Daily    ARIPiprazole  5 mg Oral Nightly    rOPINIRole  0.25 mg Oral Nightly    levothyroxine  88 mcg Oral Daily    epoetin nena-epbx  3,000 Units SubCUTAneous Once per day on Mon Wed Fri    Vitamin D  1,000 Units Per NG tube Daily     Continuous Infusions:   sodium chloride      dextrose 100 mL/hr (04/20/22 2101)     PRN Meds:.promethazine, sodium chloride flush, sodium chloride, benzocaine, [Held by provider] labetalol, glucose, diphenhydrAMINE, magic (miracle) mouthwash, trimethobenzamide, loperamide, heparin flush, white petrolatum, polyethylene glycol, dextrose, glucagon (rDNA), dextrose, albuterol    DATA:    CBC:   Lab Results   Component Value Date    WBC 4.0 04/20/2022    RBC 3.31 04/20/2022    HGB 8.3 04/20/2022    HCT 26.8 04/20/2022    MCV 81.0 04/20/2022    MCH 25.1 04/20/2022    MCHC 31.0 04/20/2022    RDW 21.2 04/20/2022     04/20/2022    MPV 10.1 04/20/2022     CMP:    Lab Results   Component Value Date     04/21/2022    K 4.0 04/21/2022    K 4.0 04/14/2022     04/21/2022    CO2 26 04/21/2022    BUN 8 04/21/2022    CREATININE 3.1 04/21/2022    GFRAA 21 04/21/2022    LABGLOM 21 04/21/2022    GLUCOSE 229 04/21/2022    GLUCOSE 130 05/18/2012    PROT 7.0 04/19/2022    LABALBU 3.6 04/19/2022    LABALBU 4.1 05/18/2012    CALCIUM 8.5 04/21/2022    BILITOT 0.4 04/19/2022    ALKPHOS 157 04/19/2022    AST 11 04/19/2022    ALT 5 04/19/2022     Magnesium:    Lab Results   Component Value Date    MG 2.4 04/21/2022     Phosphorus:    Lab Results   Component Value Date    PHOS 2.7 04/21/2022     Radiology Review:      CT Head WO Contrast March 27, 2022   No acute intracranial abnormality or hemorrhage.         CT Abdomen Pelvis WO Contrast March 27, 2022   1.  Moderate ascites throughout abdomen and pelvis.       2.  Multiple thick walled loops of small bowel throughout the abdomen could   suggest nonspecific infectious or inflammatory enteritis.       3.  Generalized body wall edema.       4.  Small bilateral pleural effusions with adjacent atelectatic changes.           Chest X-Ray March 28, 2022   Infiltrate and or atelectasis at the left lower lobe.  Substantially resolved   infiltrate and or atelectasis on the right.  New NG tube.           BRIEF SUMMARY OF INITIAL CONSULT:    Briefly, Miss Sunshine Jaimes is a 36year-old female with a PMH of ESRD on hemodialysis 3 days/wk, on TTS schedule at [facility] I DM, poorly controlled with recurrent admissions for DKA, HTN, gastroparesis, ascites, infective endocarditis, cardiac arrest, hypothyroidism, recent Covid, and depression who was admitted on March 27, 2022 after she was found unresponsive at the SNF where she resides. Patient was found to be profoundly hypoglycemic and EMS was called. She was intubated in ER for airway protection as she remained unresponsive. CT head showed no acute intracranial abnormality or hemorrhage, chest x-ray demonstrates right perihilar opacity concerning for aspiration pneumonia. She was ultimately admitted to MICU for further evaluation. Labs on admission were significant for sodium 135, potassium 5.0, chloride 100, bicarbonate 25, BUN 38, creatinine 4.9, proBNP >70,000. We are consulted for dialysis management. Problems resolved. · Hypoglycemia, was on D10, now hyperglycemic with +ketones (beta-hydroxybutyrate >4.5)  · Acute respiratory failure, 2/2 aspiration pneumonia? On 40% Fio2. Extubated 4/94  · Acute metabolic encephalopathy 2/2 hypoglycemia. Resolving   · Acidemia, pH 7.311, PCO2 35.0, HCO3 64.5, metabolic acidosis from DKA  · Hx recurrent C. difficile infection, on flagyl po Q 8hrs   · Hx of MDRO UTI  · Aspiration pneumonia? S/p piperacillin  · Hypoglycemia, to start D10 with 150 mEq of sodium chloride at 60 cc/hour    · Hypomagnesemia 2/2 poor oral intake, to replace    IMPRESSION/RECOMMENDATIONS:      1. ESKD 3 times a week TTS via RIJ tunneled dialysis catheter. For dialysis tomorrow. 2. HTN, with orthostatic hypotension, on carvediolol 12.5 mg bid   3. MBD of CKD,  Not on binders. Phos 1.5  4. Anemia of CKD, continue epoetin alpha 3,000 unit 3 times/wk. s/p transfusion   5. Vitamin D deficiency, on ergocalciferol 1000 po daily  6.  Hypophosphatemia 2/2 poor oral intake, improving  ------------------------------------------------------  7. Type I DM, poorly controlled with frequent readmissions for DKA, on SSI, lantus 1 unit nightly   8. Restless leg syndrome, on ropinirole  9. Depression, on escitalopram and Abilify  10. Hypothyroidism, on levothyroxine   11. Gastroparesis, on metoclopramide,  For EGD and pyloric dilatation with Botox injection outpatient per GI. S/p PEG-J tube placement 4/19/22  12. Nutrition: TF renal formula 20 cc/hour with H2O 30cc/hour Q 4 hours, low potassium diet     Plan:    · Continue HD three times/wk, TTS  · Discontinue erythromycin 500 mg IV with HD   · Continue epoetin alpha 3,000 units 3 times/wk  · Continue carvedilol to 12.5  mg bid  · Continue to monitor glucose levels  · Continue to monitor daily labs  · Continue to monitor phosphorus level  · Continue to monitor magnesium level  · Discharge planning, patient to return home.          Electronically signed by HEBER Zuñiga CNP on 4/21/2022 at 1:02 PM

## 2022-04-21 NOTE — FLOWSHEET NOTE
04/21/22 1012   Vital Signs   /83   Temp 97.2 °F (36.2 °C)   Pulse 84   Resp 16   Post-Hemodialysis Assessment   Post-Treatment Procedures Blood returned;Catheter capped, clamped and heparinized x 2 ports   Machine Disinfection Process Acid/Vinegar Clean;Heat Disinfect; Exterior Machine Disinfection   Rinseback Volume (ml) 300 ml   Total Liters Processed (l/min) 70.2 l/min   Dialyzer Clearance Clear   Duration of Treatment (minutes) 210 minutes   Hemodialysis Output (ml) 2000 ml   NET Removed (ml) 1700 ml   Tolerated Treatment Good   Patient Response to Treatment dialysis tx ended.  cath closedper policy and procedure

## 2022-04-21 NOTE — PROGRESS NOTES
[facility]  Internal Medicine Residency Program  Progress Note - House Team     Patient:  Jp Jiménez 36 y.o. female MRN: [id number]     Date of Service: 4/21/2022     CC: AMS, hypoglycemia  Overnight events: BS 50 in PM, given 1 amp D50 with 1U humalog     Subjective     Patient was seen and examined this morning at bedside in no acute distress. BS were low overnight, given 1 amp D50 and 1U humalog which resulted in sugars to increase to 170. Patient was started on potassium phosphate for low phos which increased BS to 320 since it was mixed with D5. Betahydroxybutyrate was wnl. Sugars are decreasing this AM. Yesterday patient was given 1U of lantus and 4U of humalog. Patient denies any nausea or vomiting, but endorses some abdominal pain at incision site. Objective     Physical Exam:  · Vitals: BP (!) 133/93   Pulse 94   Temp 97.9 °F (36.6 °C) (Oral)   Resp 16   Ht 5' 4\" (1.626 m)   Wt 133 lb 9.6 oz (60.6 kg)   SpO2 100%   BMI 22.93 kg/m²     · I & O - 24hr: I/O this shift:  · In: -   · Out: 2000    · General Appearance: alert, appears stated age and cooperative  · HEENT:  Head: Normocephalic, no lesions, without obvious abnormality. · Neck: no adenopathy, no carotid bruit, no JVD, supple, symmetrical, trachea midline and thyroid not enlarged, symmetric, no tenderness/mass/nodules  · Lung: clear to auscultation bilaterally  · Heart: regular rate and rhythm, S1, S2 normal, no murmur, click, rub or gallop  · Abdomen: soft, non-tender; bowel sounds normal; no masses,  no organomegaly  · Extremities:  extremities normal, atraumatic, no cyanosis or edema  · Musculokeletal: No joint swelling, no muscle tenderness. ROM normal in all joints of extremities.    · Neurologic: Mental status: Alert, oriented, thought content appropriate  Subject  Pertinent Labs & Imaging Studies   jorge alberto  CBC:   Lab Results   Component Value Date    WBC 4.0 04/20/2022    RBC 3.31 04/20/2022    HGB 8.3 04/20/2022    HCT 26.8 04/20/2022    MCV 81.0 04/20/2022    MCH 25.1 04/20/2022    MCHC 31.0 04/20/2022    RDW 21.2 04/20/2022     04/20/2022    MPV 10.1 04/20/2022     BMP:    Lab Results   Component Value Date     04/21/2022    K 4.0 04/21/2022    K 4.0 04/14/2022     04/21/2022    CO2 26 04/21/2022    BUN 8 04/21/2022    LABALBU 3.6 04/19/2022    LABALBU 4.1 05/18/2012    CREATININE 3.1 04/21/2022    CALCIUM 8.5 04/21/2022    GFRAA 21 04/21/2022    LABGLOM 21 04/21/2022    GLUCOSE 229 04/21/2022    GLUCOSE 130 05/18/2012       XR CHEST PORTABLE   Final Result   Borderline cardiac size with vascular congestion. XR ABDOMEN (KUB) (SINGLE AP VIEW)   Final Result   No evidence of bowel obstruction. XR CHEST PORTABLE   Final Result   1. The lungs are clear. There is no infiltrate or effusion. 2. Stable position of the support lines and tubes. XR CHEST PORTABLE   Final Result   1. There is no acute cardiopulmonary disease   2. Stable position of the support lines and tubes. XR CHEST PORTABLE   Final Result   Infiltrate and or atelectasis at the left lower lobe. Substantially resolved   infiltrate and or atelectasis on the right. New NG tube. CT HEAD WO CONTRAST   Final Result   No acute intracranial abnormality or hemorrhage. CT ABDOMEN PELVIS WO CONTRAST Additional Contrast? None   Final Result   1. Moderate ascites throughout abdomen and pelvis. 2.  Multiple thick walled loops of small bowel throughout the abdomen could   suggest nonspecific infectious or inflammatory enteritis. 3.  Generalized body wall edema. 4.  Small bilateral pleural effusions with adjacent atelectatic changes. XR ABDOMEN FOR NG/OG/NE TUBE PLACEMENT   Final Result   Enteric tube tip in the body of the stomach. XR CHEST PORTABLE   Final Result   Right perihilar opacity.               Resident's Assessment and Plan Assessment:     1. Hypersensitive IDDM1-A1c 7.7% this admission  2. Nausea/Vomiting w/ Abdominal pain with Hx of gastroparesis 2/2 Hx of IDDM  3. HTN, on coreg BID  4. ESRD on HD TTS  5. Hypothyroidism-TSH 5.37, FT4 1.10 normal  6. Acute on chronic anemia-s/p 3 unit PRBCs, chronic component likely due to ESRD  7. History of MDRO UTI  8. History of C. difficile infection  9. Oral Candidiasis  10. DKA - resolved  11. Acute hypoxic respiratory failure - resolved  12. Acute metabolic encephalopathy-likely due to hypoglycemia in setting of DM1 and ESRD (CTH negative, negative UDS/SDS, NH3 normal, B12 normal 1 month ago) - resolved  13. Aspiration pneumonia - resolved     Plan:  · Continue protonix BID, remeron 15 mg nightly, phenergan 25 mg TID PRN to aid with abdominal pain/dyspepsia,  · Per Endocrine, 1U lantus BID with regular insulin LDSS q6h              - POCT q2h   - s/p PEG-J 4/21, tube feeds advanced to 30 cc/hr if tolerate will try to advance to 40 cc/hr later in the evening  · Continue home synthroid 88 mcg daily, abilify 5 mg nightly, and lexapro 10 mg daily, ropinirole 0.25 nightly  · Continue Coreg 12.5 mg BID  · Continue oral mouth wash and orajel  · HD TThS per nephro, continue retacrit  · DME order placed for wheelchair for home going. Patient has been hospitalized for 25 days and has had deconditioning due to her condition.  Due to increased weakness, patient will require a wheelchair for transport and mobility at home.     PT/OT evaluation: not indicated  DVT prophylaxis/ GI prophylaxis: SCD/protonix  Disposition: continue current care    Elmira Alvarez MD, PGY-1  Attending physician: Dr. Brandi Graff

## 2022-04-21 NOTE — PROGRESS NOTES
IM resident notified of patient bp 164/101 with no prn meds ordered. Awaiting response. No new orders.

## 2022-04-22 LAB
ABO/RH: NORMAL
ANION GAP SERPL CALCULATED.3IONS-SCNC: 11 MMOL/L (ref 7–16)
ANION GAP SERPL CALCULATED.3IONS-SCNC: 9 MMOL/L (ref 7–16)
ANTIBODY SCREEN: NORMAL
BLOOD BANK DISPENSE STATUS: NORMAL
BLOOD BANK PRODUCT CODE: NORMAL
BPU ID: NORMAL
BUN BLDV-MCNC: 17 MG/DL (ref 6–20)
BUN BLDV-MCNC: 29 MG/DL (ref 6–20)
CALCIUM SERPL-MCNC: 8.7 MG/DL (ref 8.6–10.2)
CALCIUM SERPL-MCNC: 8.9 MG/DL (ref 8.6–10.2)
CHLORIDE BLD-SCNC: 102 MMOL/L (ref 98–107)
CHLORIDE BLD-SCNC: 109 MMOL/L (ref 98–107)
CO2: 24 MMOL/L (ref 22–29)
CO2: 25 MMOL/L (ref 22–29)
CREAT SERPL-MCNC: 2 MG/DL (ref 0.5–1)
CREAT SERPL-MCNC: 2.8 MG/DL (ref 0.5–1)
DESCRIPTION BLOOD BANK: NORMAL
GFR AFRICAN AMERICAN: 24
GFR AFRICAN AMERICAN: 35
GFR NON-AFRICAN AMERICAN: 24 ML/MIN/1.73
GFR NON-AFRICAN AMERICAN: 35 ML/MIN/1.73
GLUCOSE BLD-MCNC: 140 MG/DL (ref 74–99)
GLUCOSE BLD-MCNC: 157 MG/DL (ref 74–99)
HCT VFR BLD CALC: 22.5 % (ref 34–48)
HCT VFR BLD CALC: 22.6 % (ref 34–48)
HEMOGLOBIN: 6.9 G/DL (ref 11.5–15.5)
HEMOGLOBIN: 6.9 G/DL (ref 11.5–15.5)
MAGNESIUM: 2.1 MG/DL (ref 1.6–2.6)
MAGNESIUM: 2.2 MG/DL (ref 1.6–2.6)
MCH RBC QN AUTO: 24.6 PG (ref 26–35)
MCHC RBC AUTO-ENTMCNC: 30.7 % (ref 32–34.5)
MCV RBC AUTO: 80.4 FL (ref 80–99.9)
METER GLUCOSE: 131 MG/DL (ref 74–99)
METER GLUCOSE: 171 MG/DL (ref 74–99)
METER GLUCOSE: 200 MG/DL (ref 74–99)
METER GLUCOSE: 204 MG/DL (ref 74–99)
METER GLUCOSE: 209 MG/DL (ref 74–99)
METER GLUCOSE: 211 MG/DL (ref 74–99)
METER GLUCOSE: 220 MG/DL (ref 74–99)
METER GLUCOSE: 311 MG/DL (ref 74–99)
METER GLUCOSE: 314 MG/DL (ref 74–99)
METER GLUCOSE: 415 MG/DL (ref 74–99)
METER GLUCOSE: 98 MG/DL (ref 74–99)
PDW BLD-RTO: 23 FL (ref 11.5–15)
PHOSPHORUS: 2.8 MG/DL (ref 2.5–4.5)
PHOSPHORUS: 3 MG/DL (ref 2.5–4.5)
PLATELET # BLD: 167 E9/L (ref 130–450)
PMV BLD AUTO: ABNORMAL FL (ref 7–12)
POTASSIUM SERPL-SCNC: 4.1 MMOL/L (ref 3.5–5)
POTASSIUM SERPL-SCNC: 4.3 MMOL/L (ref 3.5–5)
RBC # BLD: 2.8 E12/L (ref 3.5–5.5)
SODIUM BLD-SCNC: 137 MMOL/L (ref 132–146)
SODIUM BLD-SCNC: 143 MMOL/L (ref 132–146)
WBC # BLD: 5.3 E9/L (ref 4.5–11.5)

## 2022-04-22 PROCEDURE — 86900 BLOOD TYPING SEROLOGIC ABO: CPT

## 2022-04-22 PROCEDURE — 6370000000 HC RX 637 (ALT 250 FOR IP): Performed by: INTERNAL MEDICINE

## 2022-04-22 PROCEDURE — 86850 RBC ANTIBODY SCREEN: CPT

## 2022-04-22 PROCEDURE — 85027 COMPLETE CBC AUTOMATED: CPT

## 2022-04-22 PROCEDURE — 36415 COLL VENOUS BLD VENIPUNCTURE: CPT

## 2022-04-22 PROCEDURE — 84100 ASSAY OF PHOSPHORUS: CPT

## 2022-04-22 PROCEDURE — 2580000003 HC RX 258: Performed by: STUDENT IN AN ORGANIZED HEALTH CARE EDUCATION/TRAINING PROGRAM

## 2022-04-22 PROCEDURE — 86901 BLOOD TYPING SEROLOGIC RH(D): CPT

## 2022-04-22 PROCEDURE — 94660 CPAP INITIATION&MGMT: CPT

## 2022-04-22 PROCEDURE — S5553 INSULIN LONG ACTING 5 U: HCPCS | Performed by: INTERNAL MEDICINE

## 2022-04-22 PROCEDURE — 6360000002 HC RX W HCPCS: Performed by: STUDENT IN AN ORGANIZED HEALTH CARE EDUCATION/TRAINING PROGRAM

## 2022-04-22 PROCEDURE — 82962 GLUCOSE BLOOD TEST: CPT

## 2022-04-22 PROCEDURE — 36430 TRANSFUSION BLD/BLD COMPNT: CPT

## 2022-04-22 PROCEDURE — 97535 SELF CARE MNGMENT TRAINING: CPT

## 2022-04-22 PROCEDURE — 6370000000 HC RX 637 (ALT 250 FOR IP): Performed by: NURSE PRACTITIONER

## 2022-04-22 PROCEDURE — 86923 COMPATIBILITY TEST ELECTRIC: CPT

## 2022-04-22 PROCEDURE — 97530 THERAPEUTIC ACTIVITIES: CPT

## 2022-04-22 PROCEDURE — 6360000002 HC RX W HCPCS: Performed by: INTERNAL MEDICINE

## 2022-04-22 PROCEDURE — 6360000002 HC RX W HCPCS: Performed by: NURSE PRACTITIONER

## 2022-04-22 PROCEDURE — 6370000000 HC RX 637 (ALT 250 FOR IP): Performed by: STUDENT IN AN ORGANIZED HEALTH CARE EDUCATION/TRAINING PROGRAM

## 2022-04-22 PROCEDURE — P9016 RBC LEUKOCYTES REDUCED: HCPCS

## 2022-04-22 PROCEDURE — 80048 BASIC METABOLIC PNL TOTAL CA: CPT

## 2022-04-22 PROCEDURE — 99233 SBSQ HOSP IP/OBS HIGH 50: CPT | Performed by: INTERNAL MEDICINE

## 2022-04-22 PROCEDURE — 99232 SBSQ HOSP IP/OBS MODERATE 35: CPT | Performed by: INTERNAL MEDICINE

## 2022-04-22 PROCEDURE — 85018 HEMOGLOBIN: CPT

## 2022-04-22 PROCEDURE — 1200000000 HC SEMI PRIVATE

## 2022-04-22 PROCEDURE — 85014 HEMATOCRIT: CPT

## 2022-04-22 PROCEDURE — 85025 COMPLETE CBC W/AUTO DIFF WBC: CPT

## 2022-04-22 PROCEDURE — 83735 ASSAY OF MAGNESIUM: CPT

## 2022-04-22 RX ORDER — INSULIN LISPRO 100 [IU]/ML
1 INJECTION, SOLUTION INTRAVENOUS; SUBCUTANEOUS ONCE
Status: COMPLETED | OUTPATIENT
Start: 2022-04-22 | End: 2022-04-22

## 2022-04-22 RX ORDER — INSULIN LISPRO 100 [IU]/ML
3 INJECTION, SOLUTION INTRAVENOUS; SUBCUTANEOUS ONCE
Status: DISCONTINUED | OUTPATIENT
Start: 2022-04-22 | End: 2022-04-22

## 2022-04-22 RX ORDER — ACETAMINOPHEN 500 MG
1000 TABLET ORAL EVERY 8 HOURS SCHEDULED
Status: DISCONTINUED | OUTPATIENT
Start: 2022-04-22 | End: 2022-04-25 | Stop reason: HOSPADM

## 2022-04-22 RX ORDER — SODIUM CHLORIDE 9 MG/ML
INJECTION, SOLUTION INTRAVENOUS PRN
Status: DISCONTINUED | OUTPATIENT
Start: 2022-04-22 | End: 2022-04-22

## 2022-04-22 RX ORDER — SODIUM CHLORIDE 9 MG/ML
INJECTION, SOLUTION INTRAVENOUS PRN
Status: DISCONTINUED | OUTPATIENT
Start: 2022-04-22 | End: 2022-04-25 | Stop reason: HOSPADM

## 2022-04-22 RX ORDER — INSULIN LISPRO 100 [IU]/ML
1 INJECTION, SOLUTION INTRAVENOUS; SUBCUTANEOUS ONCE
Status: DISCONTINUED | OUTPATIENT
Start: 2022-04-22 | End: 2022-04-22

## 2022-04-22 RX ADMIN — INSULIN GLARGINE-YFGN 1 UNITS: 100 INJECTION, SOLUTION SUBCUTANEOUS at 20:27

## 2022-04-22 RX ADMIN — THIAMINE HYDROCHLORIDE 100 MG: 100 INJECTION, SOLUTION INTRAMUSCULAR; INTRAVENOUS at 09:27

## 2022-04-22 RX ADMIN — CARVEDILOL 12.5 MG: 6.25 TABLET, FILM COATED ORAL at 20:25

## 2022-04-22 RX ADMIN — CARVEDILOL 12.5 MG: 6.25 TABLET, FILM COATED ORAL at 09:27

## 2022-04-22 RX ADMIN — DIPHENHYDRAMINE HYDROCHLORIDE 25 MG: 50 INJECTION, SOLUTION INTRAMUSCULAR; INTRAVENOUS at 06:31

## 2022-04-22 RX ADMIN — Medication 10 ML: at 20:31

## 2022-04-22 RX ADMIN — INSULIN GLARGINE-YFGN 1 UNITS: 100 INJECTION, SOLUTION SUBCUTANEOUS at 09:25

## 2022-04-22 RX ADMIN — LANSOPRAZOLE 30 MG: KIT at 16:07

## 2022-04-22 RX ADMIN — Medication 1000 UNITS: at 09:27

## 2022-04-22 RX ADMIN — ROPINIROLE 0.25 MG: 0.25 TABLET, FILM COATED ORAL at 20:25

## 2022-04-22 RX ADMIN — LEVOTHYROXINE SODIUM 88 MCG: 0.09 TABLET ORAL at 06:31

## 2022-04-22 RX ADMIN — ACETAMINOPHEN 1000 MG: 500 TABLET ORAL at 20:26

## 2022-04-22 RX ADMIN — EPOETIN ALFA-EPBX 3000 UNITS: 3000 INJECTION, SOLUTION INTRAVENOUS; SUBCUTANEOUS at 11:42

## 2022-04-22 RX ADMIN — INSULIN HUMAN 2 UNITS: 100 INJECTION, SOLUTION PARENTERAL at 20:28

## 2022-04-22 RX ADMIN — INSULIN HUMAN 4 UNITS: 100 INJECTION, SOLUTION PARENTERAL at 13:38

## 2022-04-22 RX ADMIN — DIPHENHYDRAMINE HYDROCHLORIDE 25 MG: 50 INJECTION, SOLUTION INTRAMUSCULAR; INTRAVENOUS at 20:24

## 2022-04-22 RX ADMIN — INSULIN LISPRO 1 UNITS: 100 INJECTION, SOLUTION INTRAVENOUS; SUBCUTANEOUS at 16:05

## 2022-04-22 RX ADMIN — INSULIN HUMAN 1 UNITS: 100 INJECTION, SOLUTION PARENTERAL at 07:07

## 2022-04-22 RX ADMIN — ESCITALOPRAM 10 MG: 10 TABLET, FILM COATED ORAL at 09:27

## 2022-04-22 RX ADMIN — INSULIN HUMAN 2 UNITS: 100 INJECTION, SOLUTION PARENTERAL at 01:14

## 2022-04-22 RX ADMIN — Medication 400 MG: at 00:30

## 2022-04-22 RX ADMIN — INSULIN HUMAN 2 UNITS: 100 INJECTION, SOLUTION PARENTERAL at 20:26

## 2022-04-22 RX ADMIN — SODIUM CHLORIDE, PRESERVATIVE FREE 100 UNITS: 5 INJECTION INTRAVENOUS at 09:25

## 2022-04-22 RX ADMIN — Medication 6 MG: at 20:24

## 2022-04-22 RX ADMIN — Medication 10 ML: at 09:25

## 2022-04-22 RX ADMIN — LANSOPRAZOLE 30 MG: KIT at 06:31

## 2022-04-22 RX ADMIN — ACETAMINOPHEN 1000 MG: 500 TABLET ORAL at 06:31

## 2022-04-22 RX ADMIN — LOPERAMIDE HYDROCHLORIDE 2 MG: 2 CAPSULE ORAL at 20:25

## 2022-04-22 RX ADMIN — ARIPIPRAZOLE 5 MG: 5 TABLET ORAL at 20:25

## 2022-04-22 RX ADMIN — SODIUM CHLORIDE, PRESERVATIVE FREE 100 UNITS: 5 INJECTION INTRAVENOUS at 20:28

## 2022-04-22 ASSESSMENT — PAIN DESCRIPTION - FREQUENCY: FREQUENCY: CONTINUOUS

## 2022-04-22 ASSESSMENT — PAIN DESCRIPTION - ORIENTATION: ORIENTATION: MID

## 2022-04-22 ASSESSMENT — PAIN SCALES - GENERAL
PAINLEVEL_OUTOF10: 8
PAINLEVEL_OUTOF10: 0
PAINLEVEL_OUTOF10: 8

## 2022-04-22 ASSESSMENT — PAIN DESCRIPTION - PAIN TYPE: TYPE: ACUTE PAIN

## 2022-04-22 ASSESSMENT — PAIN DESCRIPTION - DESCRIPTORS: DESCRIPTORS: ACHING

## 2022-04-22 ASSESSMENT — PAIN DESCRIPTION - ONSET: ONSET: ON-GOING

## 2022-04-22 ASSESSMENT — PAIN DESCRIPTION - LOCATION: LOCATION: ABDOMEN

## 2022-04-22 NOTE — PROGRESS NOTES
[address]  [address] [place], [place]       Date:2022                                                               Patient Name: Alyson Goodson  MRN: [id number]  : 1990  Room: [address] [place] [address]    Evaluating OT: Servando Drake, KAYLENED,  OTR/L; [id number]    Referring Provider: Delma Elizabeth MD   Specific Provider Orders/Date: OT eval and treat (3/30/22)       Diagnosis: Altered mental status [L83.32]  Acute metabolic encephalopathy [C70.02]     Reason for admission: Pt admitted with AMS, hypoglycemia from SNF. Surgery/Procedures: None this admission.      Pertinent Medical History:    Past Medical History:   Diagnosis Date    Acute congestive heart failure ([address]     BC (acute kidney injury) ([address] 10/01/2019    Cardiac arrest ([address] 02/15/2021    Cephalgia 10/09/2019    Chronic kidney disease     Depression     Diabetes mellitus ([address]     Diabetic gastroparesis associated with type 1 diabetes mellitus ([address] 2018    Diabetic ketoacidosis ([address] 2011    Diabetic ketoacidosis with coma associated with type 1 diabetes mellitus ([address] 2013    Diabetic polyneuropathy associated with type 1 diabetes mellitus ([address] 2020    Drug use complicating pregnancy in third trimester     Endocarditis 10/31/2020    ESRD (end stage renal disease) ([address] 2020    H/O cardiovascular stress test 2021    Lexiscan    Hemodialysis patient [facility])     History of blood transfusion 2019    Hyperlipidemia 10/08/2020    Hyperosmolar hyperglycemic state (HHS) ([address] 2020    Hypothyroidism 10/08/2020    Iron deficiency anemia 10/01/2019    MDRO (multiple drug resistant organisms) resistance     MRSA (methicillin resistant Staphylococcus aureus)     back wound abcess    Non compliance w medication regimen 2016    Other disorders of kidney and ureter     Pregnancy 2016    16 weeks    Previous  delivery affecting pregnancy, antepartum 2017    Previous stillbirth or demise, antepartum 2016    Seizure ([facility] 2020    Severe pre-eclampsia in third trimester 2016    Shock liver 02/15/2021    Valvular endocarditis 11/10/2020    This Diagnosis was added to the Problem List based on transcribed orders from Dr. Erma Pak           *Precautions:  Fall Risk, contact precautions    Assessment of current deficits   [x] Functional mobility  [x]ADLs  [x] Strength               [x]Cognition   [x] Functional transfers   [x] IADLs         [x] Safety Awareness   [x]Endurance   [] Fine Coordination        [] ROM     [] Vision/perception   []Sensation    []Gross Motor Coordination [x] Balance   [] Delirium                  []Motor Control     [] Communication    OT PLAN OF CARE   OT POC based on physician orders, patient diagnosis and results of clinical assessment.        Frequency/Duration: 1-3 days/wk for 1-2 weeks PRN    Specific OT Treatment Interventions to include:   * Instruction/training on adapted ADL techniques and AE recommendations to increase functional independence within precautions       * Training on energy conservation strategies, correct breathing pattern and techniques to improve independence/tolerance for self-care routine  * Functional transfer/mobility training/DME recommendations for increased independence, safety, and fall prevention  * Patient/Family education to increase follow through with safety techniques and functional independence  * Recommendation of environmental modifications for increased safety with functional transfers/mobility and ADLs  * Cognitive retraining/development of therapeutic activities to improve problem solving, judgement, memory, and attention for increased safety/participation in ADL/IADL tasks  * Sensory re-education to improve body/limb awareness, maintain/improve skin integrity, and improve hand/UE motor function  * Visual-perceptual training to improve environmental scanning, visual attention/focus, and oculomotor skills for increased safety/independence with functional transfers/mobility and ADLs  * Splinting/positioning for increased function, prevention of contractures, and improve skin integrity  * Therapeutic exercise to improve motor endurance, ROM, and functional strength for ADLs/functional transfers  * Therapeutic activities to facilitate/challenge dynamic balance, stand tolerance for increased safety and independence with ADLs  * Therapeutic activities to facilitate gross/fine motor skills for increased independence with ADLs  * Neuro-muscular re-education: facilitation of righting/equilibrium reactions, midline orientation, scapular stability/mobility, normalization of muscle tone, and facilitation of volitional active controled movement  * Positioning to improve skin integrity, interaction with environment and functional independence  * Delirium prevention/treatment  * Manual techniques for edema management      Recommended Adaptive Equipment: shower chair, TBD     Home Living: Pt lives Mother and 2 sisters  in a 1 story home with 4 step(s) to enter and no rail(s); bed/bath on main floor. Bathroom setup: tub/shower  Equipment owned: wc, walker. Prior Level of Function: Per patient received assist with ADLs; Assist with IADLs. Walker/wc for ambulation. Driving: No  Occupation: None    Pain Level: pt c/o 5/10 pain this session in stomach. Cognition: A&O: 3/4  Not oriented to day; Follows 2 step commands with MIN verbal cues. Pt with reduce alertness and attention to task today requiring mod verbal cues to remain awake throughout session. Memory: F+   Comprehension: F+   Problem solving: F+   Judgement/safety: F+               Communication skills: WFL; pt pleasant and cooperative reporting increased fatigue with light activity. Vision: [facility] [employer]               Glasses: Yes Hearing: [facility]      UE Assessment:  Hand Dominance: Right [x]  Left []     ROM Strength   RUE  WFL Grossly 3+/5   LUE WFL Grossly 3+/5     Sensation: No c/o numbness/tingling in BUE extremities. Tone: WNL  Edema: Unremarkable. Functional Assessment:  AM-PAC Daily Activity Raw Score: 16/24   Initial Eval Status  Date: 3/31/22 Treatment Status  Date: 4/22/22 STGs = LTGs  Time frame: 7-14 days   Feeding Sup  Increased time and effort to complete task. NT                     Ind.       Grooming Min A  While standing at sink to wash hands. NT                       Ind.        UB dressing/bathing Min A  While seated at EOB. Max verbal cues for task completion d/t fatigue. SBA   To don gown in supine. Pt declined to keep gown on d/t continuous bowel movement. Ind.       LB dressing/bathing Mod A overall  Min A  While seated on lowered surface to don/doff socks. Increased time and effort to complete with mod verbal cues. NT                     Ind.        Toileting Max A  Max A for STS from commode, Mod A for thoroughness of hygiene. Pt incontinent of bowel during STS transfers. Dep    For posterior hygiene in supine. Pt incontinent throughout session. SBA     Bed Mobility  Supine to sit:   Min A    Sit to supine:   Min A Sup to sit:  NT    Sit to supine:   NT    Rolling L/R in bed: SBA x8  Pt demo good command following during bed mobility. Ind.     Functional Transfers Sit to stand:   Min A    Stand to sit:   Min A    Commode: Max A with use of grab bars. Sit to stand:   NT    Stand to sit:   NT STS: Ind. Commode: SBA   Functional Mobility Min A with FWW  For short household distances  Bed<>bathroom. NT                     Ind. Balance Sitting:     Static: CGA    Dynamic: Min A  Standing: Min A Sitting:     Static: NT    Dynamic: NT  Standing: NT  Sitting:     Static: Ind.     Dynamic: Ind.  Standing: Ind. Endurance/Activity Tolerance   poor tolerance with light activity. Fair tolerance with light activity. [facility]   Visual/  Perceptual Impaired: pt reporting increased blurriness. Glasses: yes                           Treatment: OT treatment provided this date includes:    Instruction/training on safety and adapted techniques for completion of ADLs: to increase independence in self-care and facilitate engagement in hygiene and self-care.   Instruction/training on safe bed mobility/functional mobility/transfer techniques: with focus on safety, body mechanics, and precautions. Pt unable to control bowel movement. Session focusing on bed mobility, transfer technique. Comments: OK from RN to see patient. Upon arrival, patient supine in bed, incontinent of bowel. Pt demo poor tolerance with fair understanding of education/techniques. OT attempting to engage pt in functional transfers, however pt unable to remain continent. At end of session, patient supine in bed experiencing continuous bowel movement. Call light within reach, all lines and tubes intact. Pt instructed on use of call light for assistance and fall prevention. Nursing notified of patient positioning and severe diarrhea limiting pt performance in therapy.     Time In: 2:12 PM             Time Out: 2:52 PM         Total Treatment time: 40 minutes   Min Units   OT Eval Low 74532     OT Eval Medium 74399     OT Eval High 92198     OT Re-Eval [id number]     Therapeutic Ex 75448     Therapeutic Activities 33110 25 1   ADL/Self Care 26578 15 2   Orthotic Management 07271     Neuro Re-Ed 51850     Non-Billable Time         Maurisio Jasso, ELOINA,  OTR/L; [id number]

## 2022-04-22 NOTE — PROGRESS NOTES
Department of Internal Medicine  Nephrology Progress Note      Events reviewed. SUBJECTIVE:  We are following Miss Efren Taylor for ESKD on HD. Patient reports being sore, denies any nausea or vomiting. PHYSICAL EXAM:      Vitals:    VITALS:  /82   Pulse 98   Temp 98.1 °F (36.7 °C) (Oral)   Resp 18   Ht 5' 4\" (1.626 m)   Wt 133 lb 9.6 oz (60.6 kg)   SpO2 98%   BMI 22.93 kg/m²   24HR INTAKE/OUTPUT:      Intake/Output Summary (Last 24 hours) at 4/22/2022 0906  Last data filed at 4/22/2022 7730  Gross per 24 hour   Intake 1398 ml   Output 2000 ml   Net -602 ml       Access: RIJ tunneled dialysis catheter  Constitutional: Awake, alert, NAD  HEENT:  PERRL, normocephalic, atraumatic  Respiratory:  CTA bilateral  Cardiovascular/Edema:  S1/S2 RRR, no murmur gallop or rub  Gastrointestinal:  soft, tenderness, PEG-J tube noted.    Neurologic: A&OX3 follows commands, no focal deficit  Skin:  Warm, dry, ecchymotic areas to abdomen  Other: no edema    Scheduled Meds:   acetaminophen  1,000 mg Oral 3 times per day    insulin regular  0-6 Units SubCUTAneous Q6H    insulin glargine  1 Units SubCUTAneous BID    lansoprazole  30 mg Per G Tube BID AC    thiamine  100 mg IntraVENous Daily    sodium chloride flush  5-40 mL IntraVENous 2 times per day    labetalol  10 mg IntraVENous Once    carvedilol  12.5 mg Oral BID    melatonin  6 mg Oral Nightly    heparin flush  1 mL IntraVENous 2 times per day    escitalopram  10 mg Oral Daily    ARIPiprazole  5 mg Oral Nightly    rOPINIRole  0.25 mg Oral Nightly    levothyroxine  88 mcg Oral Daily    epoetin nena-epbx  3,000 Units SubCUTAneous Once per day on Mon Wed Fri    Vitamin D  1,000 Units Per NG tube Daily     Continuous Infusions:   sodium chloride      sodium chloride      dextrose 100 mL/hr (04/20/22 2101)     PRN Meds:.sodium chloride, lidocaine, promethazine, sodium chloride flush, sodium chloride, benzocaine, [Held by provider] labetalol, glucose, diphenhydrAMINE, magic (miracle) mouthwash, trimethobenzamide, loperamide, heparin flush, white petrolatum, polyethylene glycol, dextrose, glucagon (rDNA), dextrose, albuterol    DATA:    CBC:   Lab Results   Component Value Date    WBC 5.3 04/22/2022    RBC 2.80 04/22/2022    HGB 6.9 04/22/2022    HCT 22.5 04/22/2022    MCV 80.4 04/22/2022    MCH 24.6 04/22/2022    MCHC 30.7 04/22/2022    RDW 23.0 04/22/2022     04/22/2022    MPV NOT CALC 04/22/2022     CMP:    Lab Results   Component Value Date     04/22/2022    K 4.3 04/22/2022    K 4.0 04/14/2022     04/22/2022    CO2 24 04/22/2022    BUN 17 04/22/2022    CREATININE 2.0 04/22/2022    GFRAA 35 04/22/2022    LABGLOM 35 04/22/2022    GLUCOSE 140 04/22/2022    GLUCOSE 130 05/18/2012    PROT 7.0 04/19/2022    LABALBU 3.6 04/19/2022    LABALBU 4.1 05/18/2012    CALCIUM 8.7 04/22/2022    BILITOT 0.4 04/19/2022    ALKPHOS 157 04/19/2022    AST 11 04/19/2022    ALT 5 04/19/2022     Magnesium:    Lab Results   Component Value Date    MG 2.1 04/22/2022     Phosphorus:    Lab Results   Component Value Date    PHOS 3.0 04/22/2022     Radiology Review:      CT Head WO Contrast March 27, 2022   No acute intracranial abnormality or hemorrhage.         CT Abdomen Pelvis WO Contrast March 27, 2022   1.  Moderate ascites throughout abdomen and pelvis.       2.  Multiple thick walled loops of small bowel throughout the abdomen could   suggest nonspecific infectious or inflammatory enteritis.       3.  Generalized body wall edema.       4.  Small bilateral pleural effusions with adjacent atelectatic changes.           Chest X-Ray March 28, 2022   Infiltrate and or atelectasis at the left lower lobe.  Substantially resolved   infiltrate and or atelectasis on the right.  New NG tube.           BRIEF SUMMARY OF INITIAL CONSULT:    Briefly, Miss Florence Coughlin is a 36year-old female with a PMH of ESRD on hemodialysis 3 days/wk, on TTS schedule at [facility] I DM, poorly controlled with recurrent admissions for DKA, HTN, gastroparesis, ascites, infective endocarditis, cardiac arrest, hypothyroidism, recent Covid, and depression who was admitted on March 27, 2022 after she was found unresponsive at the SNF where she resides. Patient was found to be profoundly hypoglycemic and EMS was called. She was intubated in ER for airway protection as she remained unresponsive. CT head showed no acute intracranial abnormality or hemorrhage, chest x-ray demonstrates right perihilar opacity concerning for aspiration pneumonia. She was ultimately admitted to MICU for further evaluation. Labs on admission were significant for sodium 135, potassium 5.0, chloride 100, bicarbonate 25, BUN 38, creatinine 4.9, proBNP >70,000. We are consulted for dialysis management. Problems resolved. · Hypoglycemia, was on D10, now hyperglycemic with +ketones (beta-hydroxybutyrate >4.5)  · Acute respiratory failure, 2/2 aspiration pneumonia? On 40% Fio2. Extubated 8/59  · Acute metabolic encephalopathy 2/2 hypoglycemia. Resolving   · Acidemia, pH 7.311, PCO2 35.0, HCO3 16.5, metabolic acidosis from DKA  · Hx recurrent C. difficile infection, on flagyl po Q 8hrs   · Hx of MDRO UTI  · Aspiration pneumonia? S/p piperacillin  · Hypoglycemia, to start D10 with 150 mEq of sodium chloride at 60 cc/hour    · Hypomagnesemia 2/2 poor oral intake, to replace  · Hypophosphatemia 2/2 poor oral intake, improving, phos 3.0    IMPRESSION/RECOMMENDATIONS:      1. ESKD 3 times a week TTS via RIJ tunneled dialysis catheter. For HD tomorrow. 2. HTN, with orthostatic hypotension, on carvediolol 12.5 mg bid   3. MBD of CKD,  Not on binders. Phos 3.0  4. Anemia of CKD, with acute drop in hgb 6.9, continue epoetin alpha 3,000 unit 3 times/wk. S/p transfusion   5. Vitamin D deficiency, on ergocalciferol 1000 po daily  ------------------------------------------------------  6.  Type I DM, poorly controlled with frequent readmissions for DKA, on SSI, lantus 1 unit nightly   7. Restless leg syndrome, on ropinirole  8. Depression, on escitalopram and Abilify  9. Hypothyroidism, on levothyroxine   10. Gastroparesis, on metoclopramide,  For EGD and pyloric dilatation with Botox injection outpatient per GI. S/p PEG-J tube placement 4/19/22  11. Nutrition: TF renal formula 40 cc/hour with H2O 30cc/hour Q 4 hours, low potassium diet     Plan:    · Continue HD three times/wk, TTS  · Continue to monitor H&H, transfuse for Hgb <7  · Continue epoetin alpha 3,000 units 3 times/wk  · Continue carvedilol to 12.5  mg bid  · Continue to monitor glucose levels  · Continue to monitor daily labs  · Continue to monitor phosphorus level  · Continue to monitor magnesium level  · Discharge planning, patient to return home.        Electronically signed by HEBER Garza CNP on 4/22/2022 at 9:06 AM

## 2022-04-22 NOTE — PLAN OF CARE
Problem: Skin Integrity:  Goal: Will show no infection signs and symptoms  Description: Will show no infection signs and symptoms  4/22/2022 0141 by Ronna Davis RN  Outcome: Progressing  4/21/2022 1235 by Vivek Vasquez RN  Outcome: Progressing  4/21/2022 1234 by Vivek Vasquez RN  Outcome: Progressing     Problem: Serum Glucose Level - Abnormal:  Goal: Ability to maintain appropriate glucose levels has stabilized  Description: Ability to maintain appropriate glucose levels has stabilized  4/22/2022 0141 by Ronna Davis RN  Outcome: Progressing  4/21/2022 1234 by Vivek Vasquez RN  Outcome: Progressing     Problem: Breathing Pattern - Ineffective:  Goal: Ability to achieve and maintain a regular respiratory rate will improve  Description: Ability to achieve and maintain a regular respiratory rate will improve  4/22/2022 0141 by Ronna Davis RN  Outcome: Progressing  4/21/2022 1234 by Vivek Vasquez RN  Outcome: Progressing     Problem: Cardiac Output - Decreased:  Goal: Hemodynamic stability will improve  Description: Hemodynamic stability will improve  Outcome: Progressing     Problem: Diarrhea:  Goal: Bowel elimination is within specified parameters  Description: Bowel elimination is within specified parameters  Outcome: Progressing     Problem: Pain:  Goal: Pain level will decrease  Description: Pain level will decrease  4/22/2022 0141 by Ronna Davis RN  Outcome: Progressing  4/21/2022 1235 by Vivek Vasquez RN  Outcome: Progressing  4/21/2022 1234 by Vivek Vasquez RN  Outcome: Progressing  Goal: Control of acute pain  Description: Control of acute pain  4/22/2022 0141 by Ronna Davis RN  Outcome: Progressing  4/21/2022 1234 by Vivek Vasquez RN  Outcome: Progressing

## 2022-04-22 NOTE — PROGRESS NOTES
ENDOCRINOLOGY PROGRESS NOTE      Date of admission: 3/27/2022  Date of service: 4/22/2022  Admitting physician: Derek Montero DO   Primary Care Physician: Missy Burgos MD  Consultant physician: Doe Arguello MD     Reason for the consultation:  DM type 1, labile diabetes     History of Present Illness: The history is provided from medical records, pt sedated intubated     Neeraj Figueroa is a 36 y.o. old female nursing home resident with PMH of Type I DM, ESRD on PD,HTN,hypothyroidism, infective endocarditis and other listed below admitted to [facility] on 3/27/2022 because of AMS. Endocrine service was consulted for DM management.      subjective   Seen sand examined this AM, tube feeds running at goal , BG at goal     Point of care glucose monitoring (Independently reviewed)   Recent Labs     04/21/22  1859 04/21/22  2109 04/21/22  2311 04/22/22  0111 04/22/22  0308 04/22/22  0501 04/22/22  0703 04/22/22  0906   GLUMET 359* 344* 256* 220* 131* 98 171* 200*     Scheduled Meds:   acetaminophen  1,000 mg Oral 3 times per day    insulin regular  0-6 Units SubCUTAneous Q6H    insulin glargine  1 Units SubCUTAneous BID    lansoprazole  30 mg Per G Tube BID AC    thiamine  100 mg IntraVENous Daily    sodium chloride flush  5-40 mL IntraVENous 2 times per day    labetalol  10 mg IntraVENous Once    carvedilol  12.5 mg Oral BID    melatonin  6 mg Oral Nightly    heparin flush  1 mL IntraVENous 2 times per day    escitalopram  10 mg Oral Daily    ARIPiprazole  5 mg Oral Nightly    rOPINIRole  0.25 mg Oral Nightly    levothyroxine  88 mcg Oral Daily    epoetin nena-epbx  3,000 Units SubCUTAneous Once per day on Mon Wed Fri    Vitamin D  1,000 Units Per NG tube Daily     PRN Meds:   lidocaine, , PRN  promethazine, 25 mg, Q6H PRN  sodium chloride flush, 5-40 mL, PRN  sodium chloride, 25 mL, PRN  benzocaine, , BID PRN  [Held by provider] labetalol, 5 mg, Q6H PRN  glucose, 4 each, PRN  diphenhydrAMINE, 25 mg, Q6H PRN  magic (miracle) mouthwash, 5 mL, 4x Daily PRN  trimethobenzamide, 200 mg, Q6H PRN  loperamide, 2 mg, 4x Daily PRN  heparin flush, 1 mL, PRN  white petrolatum, , BID PRN  polyethylene glycol, 17 g, Daily PRN  dextrose, 12.5 g, PRN  glucagon (rDNA), 1 mg, PRN  dextrose, 100 mL/hr, PRN  albuterol, 2.5 mg, Q6H PRN      Continuous Infusions:   sodium chloride      dextrose 100 mL/hr (04/20/22 2101)       Review of Systems  Non-obtainable     OBJECTIVE    /82   Pulse 98   Temp 98.1 °F (36.7 °C) (Oral)   Resp 18   Ht 5' 4\" (1.626 m)   Wt 133 lb 9.6 oz (60.6 kg)   SpO2 98%   BMI 22.93 kg/m²     Intake/Output Summary (Last 24 hours) at 4/22/2022 1035  Last data filed at 4/22/2022 6202  Gross per 24 hour   Intake 1398 ml   Output --   Net 1398 ml     Physical examination:  Due to this being a TeleHealth encounter, evaluation of the following organ systems is limited: Vitals/Constitutional/EENT/Resp/CV/GI//MS/Neuro/Skin/Heme-Lymph-Imm. Modified physical exam through Telemedicine camera    Neck: no obvious neck mass. No obvious neck deformity     CVS: no distress   Chest: no distress. Chest is moving with respiration    Extremities:  no visible tremor  Skin: No visible rashes as seen from camera   Musculoskeletal: no visible deformity  Neuro: Alert and oriented to person, place, and time. Psychiatric: Normal mood and affect.  Behavior is normal    Review of Laboratory Data:  I personally reviewed the following labs:   Recent Labs     04/20/22  0512 04/22/22  0543   WBC 4.0* 5.3   RBC 3.31* 2.80*   HGB 8.3* 6.9*   HCT 26.8* 22.5*   MCV 81.0 80.4   MCH 25.1* 24.6*   MCHC 31.0* 30.7*   RDW 21.2* 23.0*    167   MPV 10.1 NOT CALC     Recent Labs     04/21/22  1437 04/21/22  2138 04/22/22  0543    139 137   K 4.5 4.4 4.3    104 102   CO2 25 24 24   BUN 3* 9 17   CREATININE 1.1* 1.5* 2.0*   GLUCOSE 171* 325* 140*   CALCIUM 8.5* 8.4* 8.7     Beta-Hydroxybutyrate   Date Value Ref Range Status   04/20/2022 0.06 0.02 - 0.27 mmol/L Final   04/17/2022 0.74 (H) 0.02 - 0.27 mmol/L Final   04/13/2022 2.56 (H) 0.02 - 0.27 mmol/L Final     Lab Results   Component Value Date    LABA1C 7.7 03/02/2022    LABA1C 8.7 12/08/2021    LABA1C 10.2 11/23/2021     Lab Results   Component Value Date/Time    TSH 5.370 (H) 03/28/2022 01:56 AM    T4FREE 1.10 03/28/2022 01:56 AM    P9HJDEX 8.6 11/05/2015 02:20 AM    FT3 1.3 (L) 10/31/2020 10:43 AM    R7VKQGF 36.73 (L) 07/20/2020 02:00 PM     Lab Results   Component Value Date    LABA1C 7.7 03/02/2022    GLUCOSE 140 04/22/2022    GLUCOSE 130 05/18/2012    MALBCR 2949.3 01/15/2020    LABMICR 1740.1 01/15/2020    LABCREA 59 01/15/2020    LABCREA 61 01/15/2020     Lab Results   Component Value Date    TRIG 82 01/14/2020    HDL 33 01/14/2020    LDLCALC 46 01/14/2020    CHOL 95 01/14/2020       Blood culture   Lab Results   Component Value Date    BC 5 Days no growth 03/28/2022    BC 5 Days no growth 03/27/2022       Radiology:  XR CHEST PORTABLE   Final Result   Borderline cardiac size with vascular congestion. XR ABDOMEN (KUB) (SINGLE AP VIEW)   Final Result   No evidence of bowel obstruction. XR CHEST PORTABLE   Final Result   1. The lungs are clear. There is no infiltrate or effusion. 2. Stable position of the support lines and tubes. XR CHEST PORTABLE   Final Result   1. There is no acute cardiopulmonary disease   2. Stable position of the support lines and tubes. XR CHEST PORTABLE   Final Result   Infiltrate and or atelectasis at the left lower lobe. Substantially resolved   infiltrate and or atelectasis on the right. New NG tube. CT HEAD WO CONTRAST   Final Result   No acute intracranial abnormality or hemorrhage. CT ABDOMEN PELVIS WO CONTRAST Additional Contrast? None   Final Result   1. Moderate ascites throughout abdomen and pelvis.       2.  Multiple thick walled loops of small bowel throughout the abdomen could suggest nonspecific infectious or inflammatory enteritis. 3.  Generalized body wall edema. 4.  Small bilateral pleural effusions with adjacent atelectatic changes. XR ABDOMEN FOR NG/OG/NE TUBE PLACEMENT   Final Result   Enteric tube tip in the body of the stomach. XR CHEST PORTABLE   Final Result   Right perihilar opacity. Medical Records/Labs/Images review:   I personally reviewed and summarized previous records   All labs and imaging were reviewed independently     Mary Maldonado, a 36 y.o.-old female seen today for inpatient diabetes management     Diabetes Mellitus type I    · Poor nutrition and ESRD making her diabetes very brittle   · Primary service started bolus tube feeds today   · we recommend the following sq insulin regimen   · lantus 1u BID  · 2 units Regular insulin + Low dose sliding scale q6hrs to cover bolus tube feeds   · Continue following BG and adjust insulin regimen     Failure to thrive   · Her current main issue and significantly affecting her nutrition   · Continue to monitor electrolytes closely for refeeding syndrome   · Management per primary team     Primary Hypothyroidism  · Levothyroxine was adjusted during this admission to 88 mcg daily      ESRD  · Pt at risk for hypoglycemia  · On dialysis, nephrology following    Thank you for allowing us to participate in the care of this patient. Please do not hesitate to contact us with any additional questions. Chemo Suarez MD  Endocrinologist, [facility] Diabetes Care and Endocrinology   [address] [facility] [address]   Phone: [phone]  Fax: [phone]  ----------------------------  An electronic signature was used to authenticate this note.  Earlene Ly MD on 4/22/2022 at 10:35 AM

## 2022-04-22 NOTE — PLAN OF CARE
Problem: Inadequate oral food/beverage intake (NI-2.1)  Goal: Food and/or Nutrient Delivery  Description: Individualized approach for food/nutrient provision.   Outcome: Progressing

## 2022-04-22 NOTE — PROGRESS NOTES
Nutrition Note    Per discussion via phone with resident ; requested Bolus TF regimen. Bolus TF regimen rec: Renal Formula (Nepro 1.8) 240cc 4x daily: Will provide 960cc TV, 1728 kcal, 78g pro, 698cc Free water. Note: monitor EN tolerance / electrolytes w/ consideration for risk of refeeding syndrome. Pt. Currently tolerating continuous TF at goal.     Note: regimen meets 100% of estimated protein/calorie needs.     Electronically signed by Caden Elizabeth RD on 4/22/22 at 1:39 PM EDT    Contact: ext [phone]

## 2022-04-22 NOTE — CARE COORDINATION
Pt was receiving Personal Care. Spoke with Pt's Mother Best by phone. Informed that Dr is changing feeding to Bolus and may discharge over the weekend but wants Home PT/OT. Best reluctantly agreed to home therapy for Pt. Pt refuses LTAC. Mother will not not go against her wishes. Notified [facility] that Pt may discharge over the weekend. Authorization for wc can take a week. Discharge Plan is to return home with family when medically stable. SW/CM to follow for discharge needs.    Lacy Rodriguez, L.S.W.  [phone]

## 2022-04-22 NOTE — PROGRESS NOTES
[facility]  Internal Medicine Residency Program  Progress Note - House Team     Patient:  Harika Dey 36 y.o. female MRN: [id number]     Date of Service: 4/22/2022     CC: AMS, hypoglycemia  Overnight events: No acute events overnight     Subjective     Patient was seen and examined this morning at bedside in no acute distress. Required 1U Lantus and 7U of regular insulin overnight. Tube feeds at 40 cc/hr without any bloating, nausea or vomiting. Endorses pain at PEG-J site. Objective     Physical Exam:  · Vitals: BP (!) 141/92   Pulse 99   Temp 98.5 °F (36.9 °C) (Oral)   Resp 19   Ht 5' 4\" (1.626 m)   Wt 133 lb 9.6 oz (60.6 kg)   SpO2 99%   BMI 22.93 kg/m²     · I & O - 24hr: No intake/output data recorded. · General Appearance: alert, appears stated age and cooperative  · HEENT:  Head: Normocephalic, no lesions, without obvious abnormality. · Neck: no adenopathy, no carotid bruit, no JVD, supple, symmetrical, trachea midline and thyroid not enlarged, symmetric, no tenderness/mass/nodules  · Lung: clear to auscultation bilaterally  · Heart: regular rate and rhythm, S1, S2 normal, no murmur, click, rub or gallop  · Abdomen: soft, mild TTP at insertion site, bowel sounds normal. No masses, no organomegaly, tube site c/d/i  · Extremities:  extremities normal, atraumatic, no cyanosis or edema  · Musculokeletal: No joint swelling, no muscle tenderness. ROM normal in all joints of extremities.    · Neurologic: Mental status: Alert, oriented, thought content appropriate  Subject  Pertinent Labs & Imaging Studies   jorge alberto  CBC:   Lab Results   Component Value Date    WBC 5.3 04/22/2022    RBC 2.80 04/22/2022    HGB 6.9 04/22/2022    HCT 22.5 04/22/2022    MCV 80.4 04/22/2022    MCH 24.6 04/22/2022    MCHC 30.7 04/22/2022    RDW 23.0 04/22/2022     04/22/2022    MPV NOT CALC 04/22/2022     BMP:    Lab Results   Component Value Date     04/22/2022    K 4.3 04/22/2022    K 4.0 04/14/2022     04/22/2022    CO2 24 04/22/2022    BUN 17 04/22/2022    LABALBU 3.6 04/19/2022    LABALBU 4.1 05/18/2012    CREATININE 2.0 04/22/2022    CALCIUM 8.7 04/22/2022    GFRAA 35 04/22/2022    LABGLOM 35 04/22/2022    GLUCOSE 140 04/22/2022    GLUCOSE 130 05/18/2012       XR CHEST PORTABLE   Final Result   Borderline cardiac size with vascular congestion. XR ABDOMEN (KUB) (SINGLE AP VIEW)   Final Result   No evidence of bowel obstruction. XR CHEST PORTABLE   Final Result   1. The lungs are clear. There is no infiltrate or effusion. 2. Stable position of the support lines and tubes. XR CHEST PORTABLE   Final Result   1. There is no acute cardiopulmonary disease   2. Stable position of the support lines and tubes. XR CHEST PORTABLE   Final Result   Infiltrate and or atelectasis at the left lower lobe. Substantially resolved   infiltrate and or atelectasis on the right. New NG tube. CT HEAD WO CONTRAST   Final Result   No acute intracranial abnormality or hemorrhage. CT ABDOMEN PELVIS WO CONTRAST Additional Contrast? None   Final Result   1. Moderate ascites throughout abdomen and pelvis. 2.  Multiple thick walled loops of small bowel throughout the abdomen could   suggest nonspecific infectious or inflammatory enteritis. 3.  Generalized body wall edema. 4.  Small bilateral pleural effusions with adjacent atelectatic changes. XR ABDOMEN FOR NG/OG/NE TUBE PLACEMENT   Final Result   Enteric tube tip in the body of the stomach. XR CHEST PORTABLE   Final Result   Right perihilar opacity. Resident's Assessment and Plan     Assessment:     1. Hypersensitive IDDM1-A1c 7.7% this admission  2. Nausea/Vomiting w/ Abdominal pain with Hx of diabetic gastroparesis s/p PEG-J 4/21  3. HTN, on coreg BID  4. ESRD on HD TTS  5. Hypothyroidism-TSH 5.37, FT4 1.10 normal  6.  Acute on chronic anemia-s/p 3 unit PRBCs, chronic component likely due to ESRD  7. History of MDRO UTI  8. History of C. difficile infection  9. Oral Candidiasis  10. DKA - resolved  11. Acute hypoxic respiratory failure - resolved  12. Acute metabolic encephalopathy-likely due to hypoglycemia in setting of DM1 and ESRD (CTH negative, negative UDS/SDS, NH3 normal, B12 normal 1 month ago) - resolved  13. Aspiration pneumonia - resolved     Plan:  · Hb 6.9 this AM, repeat Stat H/H  · Continue lansoprazole BID, phenergan 25 mg TID PRN to aid with abdominal pain/dyspepsia  · Discontinue Remeron  · Per Endocrine, 1U lantus BID with regular insulin LDSS q6h              - POCT q2h              - Tolerating TF @ 40 cc/hr, consider speaking to dietary about switching to bolus feeds   - Continue regular diet as tolerated  · Continue home synthroid 88 mcg daily, abilify 5 mg nightly, and lexapro 10 mg daily, ropinirole 0.25 nightly  · Continue Coreg 12.5 mg BID  · Continue oral mouth wash and orajel  · HD TThS per nephro, continue retacrit  · DME order placed for wheelchair for home going. Patient has been hospitalized for more than 20 days and has had deconditioning due to her condition. Due to increased weakness, patient will require a wheelchair for transport and mobility at home.     PT/OT evaluation: not indicated  DVT prophylaxis/ GI prophylaxis: SCD/lansoprazole  Disposition: continue current care    Gus Cortez MD, PGY-1  Attending physician: Dr. Samy Reyes  Attending Physician Statement:  Kae Garcia M.D., F.A.C.P.     I have discussed the case, including pertinent history and exam findings with the resident/NP. I have seen and examined the patient and the key elements of the encounter have been performed by me.  I agree with the resident ROS, PMHx, PSHx, meds reviewed and assessment, plan and orders as documented by the resident/NP [facility] charts reviewed, including other providers notes, relevant labs and imaging.    >50% of time spent coordinating care with other providers and/or counseling patient/family  Remainder of medical problems as per resident note.  [facility] Course:   The pt is a 37 y/o female with a PMHx of brittle T1DM, ESRD on HD, HTN, recent C. Diff infection, and endocarditis w/ consequent septic emboli formation, who presented to the ED from her long-term care facility and was hospitalized on 3/28/2022. Her presenting complaint was acute encephalopathy. Per note review, the pt's LKW was 1100 on 3/28/2022. She was found lethartic and altered at approx. 1700 by NH staff, and subsequent POCT BG was too low to be read by the glucometer. EMS was called, and the pt was transported emergently to [facility]. On arrival, she was intubated for airway protection, and was transferred to the MICU. On arrival, she was tachycardic, hypertensive, afebrile, O2 saturation 100% on the ventilator. Pro-BNP and troponin were elevated, and labs were also notable for anemia to 6.5 (baseline 6.0-8.0). UA showing pyuria and hematuria, moderate bacteria, and large LE. CXR showing possible infiltrate/edema, and CT abdomen/pelvis concerning for possible enteritis. CT head (-)ve.      Once in the MICU, the pt was started on empiric antibiotic treatment for possible aspiration pneumonia/pneumonitis, as well as suspected enteritis. Hemodialysis was restarted, and Endocrinology was consulted for aid with managing extremely labile blood sugars. Infectious cultures were negative, and the patient's antimicrobial regimen was de-escalated to Flagyl q8hrs (end date 4/4/2022). Her blood sugars were controlled w/ 1U Lantus nightly and a modified sliding scale. She was successfully extubated, and has been tolerating room air well since that time.      On the floors patient has had difficulty in controlling blood glucose levels. Patient was on 1U Lantus w/ a modified sliding scale but still patient would have episodes of hypo and hyperglycemia.  Patient complained of abdominal pain and nausea and would consistently not eat due to these symptoms. Patient was on Bentyl, Reglan, Zofran, Pepcid, Phenergan all with minimal relief. Patient was not eating much and on 4/4 and 4/5 patient's 1u Lantus was held despite being scheduled for nightly Lantus. BHB on 4/6 showed >4.50. Patient was treated on the floors with subq insulin and fluid. AG closed x2 and patient began feeling better. Patient still with episodes of hyperglycemia in 350s as well as hypoglycemia episodes. GI consulted with recs of starting patient on PPI BID as well as considering PEG-J tube at this time. Currently plans for patient to undergo PEG-J         Per GI -- reglan re eval as outptient if eats PO more  Now tube feeding into J  Sp placement of PEG-J tube and TFs.       Long standing dm1-- autonomic neuroapthy +gastroparesis  Very brittle dm - even low doses insulin can drop BS-- when NOT eating     Plan peg J-- GI last saw on Tnursday, uncertain when NPO  W00 drip some insulin- high risk DKA - noted type 1 DM   --would like goal of at least 5 total units of insulin daily  +sugar water. Amp D50W +1-2 units insulin  Few times? Daily - bc type 1DM     ACD - getting epo  Hormonal workup otherwise OK  Check zinc, cu etc.    Add thiamine IV  Refeeding syndrome-- repleting Mag/phos, K+  75 CC IV fluids--tomorrow discuss water boluses Q4-6 thru J -- wean off IV  Tolerating tube feeds 20-- to 30cc-- to 40cc   +oral eating now, better  Discussed wean off promethazine    Go home with mom-- mom wants to managed tube feeding at home- declining home health? ?  Plan bolus tube feeding for home  Giving more insulin now getting tube feed

## 2022-04-22 NOTE — PROGRESS NOTES
Date: 4/21/2022    Time: 9:41 PM    Patient Placed On BIPAP/CPAP/ Non-Invasive Ventilation? Yes    If no must comment. Facial area red/color change? No           If YES are Blister/Lesion present? No   If yes must notify nursing staff  BIPAP/CPAP skin barrier?   Yes    Skin barrier type:mepilexlite       Comments:        Luanne Mejia RCP

## 2022-04-22 NOTE — PROGRESS NOTES
Comprehensive Nutrition Assessment    Type and Reason for Visit:  Reassess    Nutrition Recommendations/Plan:   1. Continue current nutrition regimen. Pt. Tolerating TF at goal. Will monitor for nutrition progression and po/EN tolerance. Will provide updated TF recs as needed. Malnutrition Assessment:  Malnutrition Status: At risk for malnutrition (Comment) (03/31/22 3895)    Context:  Chronic Illness     Findings of the 6 clinical characteristics of malnutrition:  Energy Intake:  Mild decrease in energy intake (Comment)  Weight Loss:  Unable to assess (d/t wt flucc 2/2 ESRD + HD; measured wt hx ranging from 129-169# within the past yr)     Body Fat Loss:  Unable to assess     Muscle Mass Loss:  Unable to assess    Fluid Accumulation:  No significant fluid accumulation     Strength:  Not Performed    Nutrition Assessment:    Pt. remains at nurtional risk d/t ongoing poor po intake requiring EN support. Pt. is now s/p PEG 4/21 and tolerating TF at goal. Pt. admit w/ acute encephalopathy & AMS 2/2 hypoglycemia; DKA/Acute resp failure (resolved) s/p extubated 3/30; noted ESRD (on HD); hx of DM, Ketoacidosis,drug abuse, non-compliance w/ meds, malnutrition. Will monitor for nutrition progression. Nutrition Related Findings:    A&Ox4; active BS; no edema; -I/O 602ml, tender abd, Hyperglycemia, Oral Candidiasis, PEG Wound Type: None       Current Nutrition Intake & Therapies:    Average Meal Intake: 0%  Average Supplements Intake: 0%  ADULT DIET; Regular; Low Potassium (Less than 3000 mg/day)  ADULT TUBE FEEDING; PEG/J; Renal Formula; Continuous; 10; Yes; 10; Q 6 hours; 40; 30; Q 4 hours    Anthropometric Measures:  Height: 5' 4\" (162.6 cm)  Ideal Body Weight (IBW): 120 lbs (55 kg)    Admission Body Weight: 138 lb 3.7 oz (62.7 kg) (3/28-BS)  Current Body Weight: 133 lb 9.6 oz (60.6 kg) (4/09 BS), 117.5 % IBW.  Weight Source: Bed Scale  Current BMI (kg/m2): 22.9  Usual Body Weight:  (note w/in 1yr wts range from 129#-169# possibly d/t fluid shifts 2/2 ESRD + HD)     Weight Adjustment For: No Adjustment                 BMI Categories: Normal Weight (BMI 18.5-24. 9)    Estimated Daily Nutrient Needs:  Energy Requirements Based On: [address]  Weight Used for [employer] Requirements: Current  Energy (kcal/day): 4111-1696  Weight Used for Protein Requirements: Current  Protein (g/day): 73-85g (1.2-1.4g/kgCBW (ESRDon HD as tolerated))  Method Used for Fluid Requirements: Standard Renal  Fluid (ml/day): per renal management    Nutrition Diagnosis:   · Inadequate oral intake related to altered GI function as evidenced by nutrition support - enteral nutrition,intake 0-25%,diarrhea,vomiting,nausea      Nutrition Interventions:   Food and/or Nutrient Delivery: Continue Current Diet,Continue Current Tube Feeding  Nutrition Education/Counseling: Education not indicated  Coordination of Nutrition Care: Continue to monitor while inpatient       Goals:  Previous Goal Met: Goal(s) Achieved  Goals: Tolerate nutrition support at goal rate,by next RD assessment       Nutrition Monitoring and Evaluation:   Behavioral-Environmental Outcomes: None Identified  Food/Nutrient Intake Outcomes: Diet Advancement/Tolerance,Food and Nutrient Intake,Enteral Nutrition Intake/Tolerance  Physical Signs/Symptoms Outcomes: Biochemical Data,Diarrhea,GI Status,Nausea or Vomiting,Nutrition Focused Physical Findings,Skin,Weight,Fluid Status or Edema    Discharge Planning:     Too soon to determine     Hernandez Giraldo RD  Contact: ext [phone]

## 2022-04-22 NOTE — PROGRESS NOTES
Occupational Therapy  OT SESSION ATTEMPT     Date:2022  Patient Name: Antonio Retana  MRN: [id number]  : 1990  [facility]     Attempted OT session this date:    [x] unavailable due to other medical staff currently with pt   [] on hold per nursing staff   [] on hold per nursing staff secondary to lab / radiology results    [] declined treatment  this date due to ____. Benefits of participation in therapy reviewed with pt.    [] off unit   [] Other:     Will reattempt OT at a later time.     KAYLENE DavisR/L #[id number]

## 2022-04-23 LAB
ACANTHOCYTES: ABNORMAL
ANION GAP SERPL CALCULATED.3IONS-SCNC: 13 MMOL/L (ref 7–16)
ANISOCYTOSIS: ABNORMAL
ANISOCYTOSIS: ABNORMAL
BASOPHILS ABSOLUTE: 0.05 E9/L (ref 0–0.2)
BASOPHILS ABSOLUTE: 0.06 E9/L (ref 0–0.2)
BASOPHILS RELATIVE PERCENT: 0.9 % (ref 0–2)
BASOPHILS RELATIVE PERCENT: 0.9 % (ref 0–2)
BUN BLDV-MCNC: 38 MG/DL (ref 6–20)
BURR CELLS: ABNORMAL
BURR CELLS: ABNORMAL
CALCIUM SERPL-MCNC: 8.9 MG/DL (ref 8.6–10.2)
CERULOPLASMIN: 11 MG/DL (ref 16–45)
CHLORIDE BLD-SCNC: 106 MMOL/L (ref 98–107)
CO2: 19 MMOL/L (ref 22–29)
CREAT SERPL-MCNC: 3.3 MG/DL (ref 0.5–1)
EOSINOPHILS ABSOLUTE: 0.1 E9/L (ref 0.05–0.5)
EOSINOPHILS ABSOLUTE: 0.63 E9/L (ref 0.05–0.5)
EOSINOPHILS RELATIVE PERCENT: 1.5 % (ref 0–6)
EOSINOPHILS RELATIVE PERCENT: 10.4 % (ref 0–6)
GFR AFRICAN AMERICAN: 20
GFR NON-AFRICAN AMERICAN: 20 ML/MIN/1.73
GLUCOSE BLD-MCNC: 30 MG/DL (ref 74–99)
GLUCOSE BLD-MCNC: 398 MG/DL (ref 74–99)
HCT VFR BLD CALC: 26.3 % (ref 34–48)
HCT VFR BLD CALC: 27.4 % (ref 34–48)
HEMOGLOBIN: 8.1 G/DL (ref 11.5–15.5)
HEMOGLOBIN: 8.2 G/DL (ref 11.5–15.5)
HYPOCHROMIA: ABNORMAL
HYPOCHROMIA: ABNORMAL
IMMATURE GRANULOCYTES #: 0.02 E9/L
IMMATURE GRANULOCYTES %: 0.3 % (ref 0–5)
LYMPHOCYTES ABSOLUTE: 0.99 E9/L (ref 1.5–4)
LYMPHOCYTES ABSOLUTE: 1.59 E9/L (ref 1.5–4)
LYMPHOCYTES RELATIVE PERCENT: 14.6 % (ref 20–42)
LYMPHOCYTES RELATIVE PERCENT: 26.1 % (ref 20–42)
MAGNESIUM: 2.2 MG/DL (ref 1.6–2.6)
MCH RBC QN AUTO: 24.4 PG (ref 26–35)
MCH RBC QN AUTO: 24.5 PG (ref 26–35)
MCHC RBC AUTO-ENTMCNC: 29.9 % (ref 32–34.5)
MCHC RBC AUTO-ENTMCNC: 30.8 % (ref 32–34.5)
MCV RBC AUTO: 79.5 FL (ref 80–99.9)
MCV RBC AUTO: 81.5 FL (ref 80–99.9)
METER GLUCOSE: 110 MG/DL (ref 74–99)
METER GLUCOSE: 184 MG/DL (ref 74–99)
METER GLUCOSE: 186 MG/DL (ref 74–99)
METER GLUCOSE: 262 MG/DL (ref 74–99)
METER GLUCOSE: 309 MG/DL (ref 74–99)
METER GLUCOSE: 436 MG/DL (ref 74–99)
METER GLUCOSE: 444 MG/DL (ref 74–99)
METER GLUCOSE: 73 MG/DL (ref 74–99)
METER GLUCOSE: <40 MG/DL (ref 74–99)
MONOCYTES ABSOLUTE: 0 E9/L (ref 0.1–0.95)
MONOCYTES ABSOLUTE: 0.23 E9/L (ref 0.1–0.95)
MONOCYTES RELATIVE PERCENT: 3.4 % (ref 2–12)
MONOCYTES RELATIVE PERCENT: 4.1 % (ref 2–12)
NEUTROPHILS ABSOLUTE: 3.84 E9/L (ref 1.8–7.3)
NEUTROPHILS ABSOLUTE: 5.38 E9/L (ref 1.8–7.3)
NEUTROPHILS RELATIVE PERCENT: 62.6 % (ref 43–80)
NEUTROPHILS RELATIVE PERCENT: 79.3 % (ref 43–80)
OVALOCYTES: ABNORMAL
PDW BLD-RTO: 21.9 FL (ref 11.5–15)
PDW BLD-RTO: 21.9 FL (ref 11.5–15)
PHOSPHORUS: 2.2 MG/DL (ref 2.5–4.5)
PLATELET # BLD: 213 E9/L (ref 130–450)
PLATELET # BLD: 221 E9/L (ref 130–450)
PMV BLD AUTO: 9.8 FL (ref 7–12)
PMV BLD AUTO: 9.8 FL (ref 7–12)
POIKILOCYTES: ABNORMAL
POIKILOCYTES: ABNORMAL
POLYCHROMASIA: ABNORMAL
POTASSIUM SERPL-SCNC: 4.4 MMOL/L (ref 3.5–5)
RBC # BLD: 3.31 E12/L (ref 3.5–5.5)
RBC # BLD: 3.36 E12/L (ref 3.5–5.5)
SCHISTOCYTES: ABNORMAL
SODIUM BLD-SCNC: 138 MMOL/L (ref 132–146)
STOMATOCYTES: ABNORMAL
TARGET CELLS: ABNORMAL
TARGET CELLS: ABNORMAL
WBC # BLD: 6.1 E9/L (ref 4.5–11.5)
WBC # BLD: 6.8 E9/L (ref 4.5–11.5)

## 2022-04-23 PROCEDURE — 6370000000 HC RX 637 (ALT 250 FOR IP): Performed by: INTERNAL MEDICINE

## 2022-04-23 PROCEDURE — 80048 BASIC METABOLIC PNL TOTAL CA: CPT

## 2022-04-23 PROCEDURE — 99232 SBSQ HOSP IP/OBS MODERATE 35: CPT | Performed by: INTERNAL MEDICINE

## 2022-04-23 PROCEDURE — 82962 GLUCOSE BLOOD TEST: CPT

## 2022-04-23 PROCEDURE — 84100 ASSAY OF PHOSPHORUS: CPT

## 2022-04-23 PROCEDURE — 90935 HEMODIALYSIS ONE EVALUATION: CPT

## 2022-04-23 PROCEDURE — 6370000000 HC RX 637 (ALT 250 FOR IP): Performed by: STUDENT IN AN ORGANIZED HEALTH CARE EDUCATION/TRAINING PROGRAM

## 2022-04-23 PROCEDURE — 6360000002 HC RX W HCPCS: Performed by: INTERNAL MEDICINE

## 2022-04-23 PROCEDURE — 2580000003 HC RX 258: Performed by: STUDENT IN AN ORGANIZED HEALTH CARE EDUCATION/TRAINING PROGRAM

## 2022-04-23 PROCEDURE — 83735 ASSAY OF MAGNESIUM: CPT

## 2022-04-23 PROCEDURE — 82947 ASSAY GLUCOSE BLOOD QUANT: CPT

## 2022-04-23 PROCEDURE — 85025 COMPLETE CBC W/AUTO DIFF WBC: CPT

## 2022-04-23 PROCEDURE — 99233 SBSQ HOSP IP/OBS HIGH 50: CPT | Performed by: INTERNAL MEDICINE

## 2022-04-23 PROCEDURE — 36415 COLL VENOUS BLD VENIPUNCTURE: CPT

## 2022-04-23 PROCEDURE — S5553 INSULIN LONG ACTING 5 U: HCPCS | Performed by: INTERNAL MEDICINE

## 2022-04-23 PROCEDURE — 1200000000 HC SEMI PRIVATE

## 2022-04-23 PROCEDURE — 2500000003 HC RX 250 WO HCPCS: Performed by: INTERNAL MEDICINE

## 2022-04-23 PROCEDURE — 6370000000 HC RX 637 (ALT 250 FOR IP): Performed by: NURSE PRACTITIONER

## 2022-04-23 PROCEDURE — 6360000002 HC RX W HCPCS: Performed by: STUDENT IN AN ORGANIZED HEALTH CARE EDUCATION/TRAINING PROGRAM

## 2022-04-23 PROCEDURE — 94660 CPAP INITIATION&MGMT: CPT

## 2022-04-23 RX ADMIN — CARVEDILOL 12.5 MG: 6.25 TABLET, FILM COATED ORAL at 20:38

## 2022-04-23 RX ADMIN — Medication 12.5 G: at 00:58

## 2022-04-23 RX ADMIN — INSULIN HUMAN 4 UNITS: 100 INJECTION, SOLUTION PARENTERAL at 06:45

## 2022-04-23 RX ADMIN — LANSOPRAZOLE 30 MG: KIT at 06:41

## 2022-04-23 RX ADMIN — THIAMINE HYDROCHLORIDE 100 MG: 100 INJECTION, SOLUTION INTRAMUSCULAR; INTRAVENOUS at 08:16

## 2022-04-23 RX ADMIN — DIPHENHYDRAMINE HYDROCHLORIDE 25 MG: 50 INJECTION, SOLUTION INTRAMUSCULAR; INTRAVENOUS at 18:37

## 2022-04-23 RX ADMIN — SODIUM CHLORIDE, PRESERVATIVE FREE 100 UNITS: 5 INJECTION INTRAVENOUS at 08:15

## 2022-04-23 RX ADMIN — Medication 6 MG: at 20:38

## 2022-04-23 RX ADMIN — ARIPIPRAZOLE 5 MG: 5 TABLET ORAL at 20:38

## 2022-04-23 RX ADMIN — LIDOCAINE: 50 OINTMENT TOPICAL at 09:59

## 2022-04-23 RX ADMIN — ACETAMINOPHEN 1000 MG: 500 TABLET ORAL at 23:09

## 2022-04-23 RX ADMIN — Medication 4 EACH: at 18:36

## 2022-04-23 RX ADMIN — LEVOTHYROXINE SODIUM 88 MCG: 0.09 TABLET ORAL at 06:38

## 2022-04-23 RX ADMIN — ACETAMINOPHEN 1000 MG: 500 TABLET ORAL at 06:38

## 2022-04-23 RX ADMIN — LOPERAMIDE HYDROCHLORIDE 2 MG: 2 CAPSULE ORAL at 18:37

## 2022-04-23 RX ADMIN — Medication 1 UNITS: at 20:42

## 2022-04-23 RX ADMIN — Medication 3 UNITS: at 12:54

## 2022-04-23 RX ADMIN — LOPERAMIDE HYDROCHLORIDE 2 MG: 2 CAPSULE ORAL at 08:16

## 2022-04-23 RX ADMIN — ESCITALOPRAM 10 MG: 10 TABLET, FILM COATED ORAL at 08:16

## 2022-04-23 RX ADMIN — INSULIN HUMAN 2 UNITS: 100 INJECTION, SOLUTION PARENTERAL at 12:54

## 2022-04-23 RX ADMIN — CARVEDILOL 12.5 MG: 6.25 TABLET, FILM COATED ORAL at 08:16

## 2022-04-23 RX ADMIN — INSULIN GLARGINE-YFGN 1 UNITS: 100 INJECTION, SOLUTION SUBCUTANEOUS at 08:16

## 2022-04-23 RX ADMIN — Medication 1000 UNITS: at 08:16

## 2022-04-23 RX ADMIN — Medication 10 ML: at 20:44

## 2022-04-23 RX ADMIN — ROPINIROLE 0.25 MG: 0.25 TABLET, FILM COATED ORAL at 20:39

## 2022-04-23 RX ADMIN — DIPHENHYDRAMINE HYDROCHLORIDE 25 MG: 50 INJECTION, SOLUTION INTRAMUSCULAR; INTRAVENOUS at 08:16

## 2022-04-23 RX ADMIN — INSULIN HUMAN 2 UNITS: 100 INJECTION, SOLUTION PARENTERAL at 06:43

## 2022-04-23 RX ADMIN — Medication 10 ML: at 08:15

## 2022-04-23 RX ADMIN — SODIUM CHLORIDE, PRESERVATIVE FREE 100 UNITS: 5 INJECTION INTRAVENOUS at 20:49

## 2022-04-23 RX ADMIN — INSULIN HUMAN 2 UNITS: 100 INJECTION, SOLUTION PARENTERAL at 20:41

## 2022-04-23 RX ADMIN — INSULIN GLARGINE-YFGN 1 UNITS: 100 INJECTION, SOLUTION SUBCUTANEOUS at 20:41

## 2022-04-23 ASSESSMENT — PAIN SCALES - GENERAL
PAINLEVEL_OUTOF10: 4
PAINLEVEL_OUTOF10: 7
PAINLEVEL_OUTOF10: 4
PAINLEVEL_OUTOF10: 4

## 2022-04-23 ASSESSMENT — PAIN DESCRIPTION - ORIENTATION
ORIENTATION: MID
ORIENTATION: MID

## 2022-04-23 ASSESSMENT — PAIN DESCRIPTION - LOCATION
LOCATION: ABDOMEN
LOCATION: ABDOMEN

## 2022-04-23 ASSESSMENT — PAIN DESCRIPTION - FREQUENCY: FREQUENCY: CONTINUOUS

## 2022-04-23 ASSESSMENT — PAIN DESCRIPTION - DESCRIPTORS
DESCRIPTORS: ACHING;DISCOMFORT
DESCRIPTORS: ACHING;DULL;DISCOMFORT

## 2022-04-23 ASSESSMENT — PAIN DESCRIPTION - ONSET: ONSET: ON-GOING

## 2022-04-23 ASSESSMENT — PAIN DESCRIPTION - PAIN TYPE: TYPE: ACUTE PAIN

## 2022-04-23 NOTE — PROGRESS NOTES
Department of Internal Medicine  Nephrology Progress Note      Events reviewed. SUBJECTIVE:  We are following Miss Renee José for ESRD on HD. Patient reports her abdomen is sore, denies any nausea or vomiting. PHYSICAL EXAM:      Vitals:    VITALS:  /65   Pulse 96   Temp 98 °F (36.7 °C)   Resp 16   Ht 5' 4\" (1.626 m)   Wt 133 lb 9.6 oz (60.6 kg)   SpO2 98%   BMI 22.93 kg/m²   24HR INTAKE/OUTPUT:      Intake/Output Summary (Last 24 hours) at 4/23/2022 1419  Last data filed at 4/23/2022 5794  Gross per 24 hour   Intake 770 ml   Output --   Net 770 ml       Access: RIJ tunneled dialysis catheter  Constitutional: Awake, alert, NAD Flat affect  HEENT:  PERRL, normocephalic, atraumatic  Respiratory:  CTA bilateral  Cardiovascular/Edema:  S1/S2 RRR, no murmur gallop or rub  Gastrointestinal:  soft, tenderness, PEG-J tube noted.    Neurologic: A&OX3 follows commands, no focal deficit  Skin:  Warm, dry, ecchymotic areas to abdomen  Other: no edema    Scheduled Meds:   insulin regular  0-4 Units SubCUTAneous Q6H    acetaminophen  1,000 mg Oral 3 times per day    insulin regular  2 Units SubCUTAneous Q6H    insulin glargine  1 Units SubCUTAneous BID    lansoprazole  30 mg Per G Tube BID AC    thiamine  100 mg IntraVENous Daily    sodium chloride flush  5-40 mL IntraVENous 2 times per day    labetalol  10 mg IntraVENous Once    carvedilol  12.5 mg Oral BID    melatonin  6 mg Oral Nightly    heparin flush  1 mL IntraVENous 2 times per day    escitalopram  10 mg Oral Daily    ARIPiprazole  5 mg Oral Nightly    rOPINIRole  0.25 mg Oral Nightly    levothyroxine  88 mcg Oral Daily    epoetin nena-epbx  3,000 Units SubCUTAneous Once per day on Mon Wed Fri    Vitamin D  1,000 Units Per NG tube Daily     Continuous Infusions:   sodium chloride      sodium chloride      dextrose 100 mL/hr (04/20/22 2101)     PRN Meds:.sodium chloride, lidocaine, promethazine, sodium chloride flush, sodium chloride, benzocaine, [Held by provider] labetalol, glucose, diphenhydrAMINE, magic (miracle) mouthwash, trimethobenzamide, loperamide, heparin flush, white petrolatum, polyethylene glycol, dextrose, glucagon (rDNA), dextrose, albuterol    DATA:    CBC:   Lab Results   Component Value Date    WBC 6.8 04/23/2022    RBC 3.36 04/23/2022    HGB 8.2 04/23/2022    HCT 27.4 04/23/2022    MCV 81.5 04/23/2022    MCH 24.4 04/23/2022    MCHC 29.9 04/23/2022    RDW 21.9 04/23/2022     04/23/2022    MPV 9.8 04/23/2022     CMP:    Lab Results   Component Value Date     04/23/2022    K 4.4 04/23/2022    K 4.0 04/14/2022     04/23/2022    CO2 19 04/23/2022    BUN 38 04/23/2022    CREATININE 3.3 04/23/2022    GFRAA 20 04/23/2022    LABGLOM 20 04/23/2022    GLUCOSE 398 04/23/2022    GLUCOSE 130 05/18/2012    PROT 7.0 04/19/2022    LABALBU 3.6 04/19/2022    LABALBU 4.1 05/18/2012    CALCIUM 8.9 04/23/2022    BILITOT 0.4 04/19/2022    ALKPHOS 157 04/19/2022    AST 11 04/19/2022    ALT 5 04/19/2022     Magnesium:    Lab Results   Component Value Date    MG 2.2 04/23/2022     Phosphorus:    Lab Results   Component Value Date    PHOS 2.2 04/23/2022     Radiology Review:      CT Head WO Contrast March 27, 2022   No acute intracranial abnormality or hemorrhage. CT Abdomen Pelvis WO Contrast March 27, 2022   1. Moderate ascites throughout abdomen and pelvis. 2.  Multiple thick walled loops of small bowel throughout the abdomen could   suggest nonspecific infectious or inflammatory enteritis. 3.  Generalized body wall edema. 4.  Small bilateral pleural effusions with adjacent atelectatic changes. Chest X-Ray March 28, 2022   Infiltrate and or atelectasis at the left lower lobe. Substantially resolved   infiltrate and or atelectasis on the right. New NG tube.            BRIEF SUMMARY OF INITIAL CONSULT:    Briefly, Miss John Dimas is a 36year-old female with a PMH of ESRD on hemodialysis 3 days/wk, on TTS schedule at [facility], Type I DM, poorly controlled with recurrent admissions for DKA, HTN, gastroparesis, ascites, infective endocarditis, cardiac arrest, hypothyroidism, recent Covid, and depression who was admitted on March 27, 2022 after she was found unresponsive at the SNF where she resides. Patient was found to be profoundly hypoglycemic and EMS was called. She was intubated in ER for airway protection as she remained unresponsive. CT head showed no acute intracranial abnormality or hemorrhage, chest x-ray demonstrates right perihilar opacity concerning for aspiration pneumonia. She was ultimately admitted to MICU for further evaluation. Labs on admission were significant for sodium 135, potassium 5.0, chloride 100, bicarbonate 25, BUN 38, creatinine 4.9, proBNP >70,000. We are consulted for dialysis management. Problems resolved. Hypoglycemia, was on D10, now hyperglycemic with +ketones (beta-hydroxybutyrate >4.5)  Acute respiratory failure, 2/2 aspiration pneumonia? On 40% Fio2. Extubated 4/71  Acute metabolic encephalopathy 2/2 hypoglycemia. Resolving   Acidemia, pH 7.311, PCO2 35.0, HCO3 71.1, metabolic acidosis from DKA  Hx recurrent C. difficile infection, on flagyl po Q 8hrs   Hx of MDRO UTI  Aspiration pneumonia? S/p piperacillin  Hypoglycemia, to start D10 with 150 mEq of sodium chloride at 60 cc/hour    Hypomagnesemia 2/2 poor oral intake, to replace  Hypophosphatemia 2/2 poor oral intake, improving, phos 3.0    IMPRESSION/RECOMMENDATIONS:      ESKD 3 times a week TTS via RIJ tunneled dialysis catheter. For HD tomorrow. HTN, with orthostatic hypotension, on carvediolol 12.5 mg bid   MBD of CKD,  Not on binders. Phos 3.0  Anemia of CKD, with acute drop in hgb 6.9, continue epoetin alpha 3,000 unit 3 times/wk.  S/p transfusion   Vitamin D deficiency, on ergocalciferol 1000 po daily  ------------------------------------------------------  Type I DM, poorly controlled with frequent readmissions for DKA, on SSI, lantus 1 unit nightly   Restless leg syndrome, on ropinirole  Depression, on escitalopram and Abilify  Hypothyroidism, on levothyroxine   Gastroparesis, on metoclopramide,  For EGD and pyloric dilatation with Botox injection outpatient per GI. S/p PEG-J tube placement 4/19/22  Nutrition: TF Renal formula 40 cc/hour with H2O 30cc/hour Q 4 hours, low potassium diet     Plan:    Continue HD three times/wk, TTS, tolerating HD well   Continue to monitor H&H, transfuse for Hgb <7  Continue epoetin alpha 3,000 units 3 times/wk  Continue carvedilol to 12.5  mg bid  Continue to monitor glucose levels  Continue to monitor daily labs  Continue to monitor phosphorus level  Continue to monitor magnesium level  Discharge planning, patient to return home.      Case discussed with Dr Laraine Prader    Electronically signed by HEBER Duff on 4/23/2022 at 2:19 PM  Agree as annotated  Silvano Mane MD

## 2022-04-23 NOTE — PROGRESS NOTES
[facility]  Internal Medicine Residency Program  Progress Note - House Team     Patient:  Catherine Painting 36 y.o. female MRN: [id number]     Date of Service: 4/23/2022     CC: AMS, hypoglycemia  Overnight events: BS dropped to < 40, given 1 amp D50, held 2U regular insulin for 1 dose. Resumed 2U regular insulin this AM, . Subjective     Patient was seen and examined this morning at bedside in no acute distress.  this AM, required 2U lantus, 1U lispro and 9U regular insulin yesterday. Patient getting bolus feeds without any nausea, vomiting, bloating. Endorses worsening loose stools. Per nurse, patient had multiple liquid stools overnight. Patient eating breakfast this AM.    Objective     Physical Exam:  · Vitals: /82   Pulse 115   Temp 98.6 °F (37 °C) (Oral)   Resp 17   Ht 5' 4\" (1.626 m)   Wt 133 lb 9.6 oz (60.6 kg)   SpO2 97%   BMI 22.93 kg/m²     · I & O - 24hr: No intake/output data recorded. · General Appearance: alert, appears stated age and cooperative  · HEENT:  Head: Normocephalic, no lesions, without obvious abnormality. · Neck: no adenopathy, no carotid bruit, no JVD, supple, symmetrical, trachea midline and thyroid not enlarged, symmetric, no tenderness/mass/nodules  · Lung: clear to auscultation bilaterally  · Heart: regular rate and rhythm, S1, S2 normal, no murmur, click, rub or gallop  · Abdomen: soft, non-tender; bowel sounds normal; no masses,  no organomegaly. PEG-J site c/d/i  · Extremities:  extremities normal, atraumatic, no cyanosis or edema  · Musculokeletal: No joint swelling, no muscle tenderness. ROM normal in all joints of extremities.    · Neurologic: Mental status: Alert, oriented, thought content appropriate  Subject  Pertinent Labs & Imaging Studies   jorge alberto  CBC:   Lab Results   Component Value Date    WBC 6.1 04/22/2022    RBC 3.31 04/22/2022    HGB 8.1 04/22/2022    HCT 26.3 04/22/2022    MCV 79.5 04/22/2022    MCH 24.5 04/22/2022 MCHC 30.8 04/22/2022    RDW 21.9 04/22/2022     04/22/2022    MPV 9.8 04/22/2022     BMP:    Lab Results   Component Value Date     04/22/2022    K 4.1 04/22/2022    K 4.0 04/14/2022     04/22/2022    CO2 25 04/22/2022    BUN 29 04/22/2022    LABALBU 3.6 04/19/2022    LABALBU 4.1 05/18/2012    CREATININE 2.8 04/22/2022    CALCIUM 8.9 04/22/2022    GFRAA 24 04/22/2022    LABGLOM 24 04/22/2022    GLUCOSE 157 04/22/2022    GLUCOSE 130 05/18/2012       XR CHEST PORTABLE   Final Result   Borderline cardiac size with vascular congestion. XR ABDOMEN (KUB) (SINGLE AP VIEW)   Final Result   No evidence of bowel obstruction. XR CHEST PORTABLE   Final Result   1. The lungs are clear. There is no infiltrate or effusion. 2. Stable position of the support lines and tubes. XR CHEST PORTABLE   Final Result   1. There is no acute cardiopulmonary disease   2. Stable position of the support lines and tubes. XR CHEST PORTABLE   Final Result   Infiltrate and or atelectasis at the left lower lobe. Substantially resolved   infiltrate and or atelectasis on the right. New NG tube. CT HEAD WO CONTRAST   Final Result   No acute intracranial abnormality or hemorrhage. CT ABDOMEN PELVIS WO CONTRAST Additional Contrast? None   Final Result   1. Moderate ascites throughout abdomen and pelvis. 2.  Multiple thick walled loops of small bowel throughout the abdomen could   suggest nonspecific infectious or inflammatory enteritis. 3.  Generalized body wall edema. 4.  Small bilateral pleural effusions with adjacent atelectatic changes. XR ABDOMEN FOR NG/OG/NE TUBE PLACEMENT   Final Result   Enteric tube tip in the body of the stomach. XR CHEST PORTABLE   Final Result   Right perihilar opacity. Resident's Assessment and Plan     Assessment:     1. Hypersensitive IDDM1-A1c 7.7% this admission  2.  Nausea/Vomiting w/ Abdominal pain with Hx of diabetic gastroparesis s/p PEG-J 4/21  3. HTN, on coreg BID  4. ESRD on HD TTS  5. Hypothyroidism-TSH 5.37, FT4 1.10 normal  6. Acute on chronic anemia-s/p 4 unit PRBCs, chronic component likely due to ESRD  7. History of MDRO UTI  8. History of C. difficile infection  9. Oral Candidiasis  10. DKA - resolved  11. Acute hypoxic respiratory failure - resolved  12. Acute metabolic encephalopathy-likely due to hypoglycemia in setting of DM1 and ESRD (CTH negative, negative UDS/SDS, NH3 normal, B12 normal 1 month ago) - resolved  13. Aspiration pneumonia - resolved     Plan:  · Post transfusion H/H 8.1  · Continue lansoprazole BID, phenergan 25 mg TID PRN to aid with abdominal pain/dyspepsia  · Per Endocrine, 1U lantus BID with regular insulin modified SSI (0-4 for 100>180) q6h, 2U regular insulin with bolus feeds              - POCT q2h              - Bolus feeds 240 q6h              - Continue regular diet as tolerated   - Monitor daily BMP, Mg, Phos  · Continue home synthroid 88 mcg daily, abilify 5 mg nightly, and lexapro 10 mg daily, ropinirole 0.25 nightly  · Continue Coreg 12.5 mg BID  · Continue oral mouth wash and orajel  · Pain control with tylenol scheduled and lidocaine PRN for PEG-J site. · HD TThS per nephro, continue retacrit  · DME order placed for wheelchair for home going. Patient has been hospitalized for more than 20 days and has had deconditioning due to her condition. Due to increased weakness, patient will require a wheelchair for transport and mobility at home.     PT/OT evaluation: not indicated  DVT prophylaxis/ GI prophylaxis: SCD/lansoprazole  Disposition: continue current care    Liliana Shaikh MD, PGY-1  Attending physician: Dr. Haylie Sawyer    Attending Physician Statement:  Lona Anderson M.D., F.A.C.P.     I have discussed the case, including pertinent history and exam findings with the resident/NP.  I have seen and examined the patient and the key elements of the encounter have been performed by me. Félix Kent agree with the resident ROS, PMHx, PSHx, meds reviewed and assessment, plan and orders as documented by the resident/NP [facility] [place] [facility] charts reviewed, including other providers notes, relevant labs and imaging. >50% of time spent coordinating care with other providers and/or counseling patient/family  Remainder of medical problems as per resident note.  [place] Course:   The pt is a 35 y/o female with a PMHx of brittle T1DM, ESRD on HD, HTN, recent C. Diff infection, and endocarditis w/ consequent septic emboli formation, who presented to the ED from her long-term care facility and was hospitalized on 3/28/2022. Her presenting complaint was acute encephalopathy. Per note review, the pt's LKW was 1100 on 3/28/2022. She was found lethartic and altered at approx. 1700 by NH staff, and subsequent POCT BG was too low to be read by the glucometer. EMS was called, and the pt was transported emergently to [facility]. On arrival, she was intubated for airway protection, and was transferred to the MICU. On arrival, she was tachycardic, hypertensive, afebrile, O2 saturation 100% on the ventilator. Pro-BNP and troponin were elevated, and labs were also notable for anemia to 6.5 (baseline 6.0-8.0). UA showing pyuria and hematuria, moderate bacteria, and large LE. CXR showing possible infiltrate/edema, and CT abdomen/pelvis concerning for possible enteritis. CT head (-)ve.      Once in the MICU, the pt was started on empiric antibiotic treatment for possible aspiration pneumonia/pneumonitis, as well as suspected enteritis. Hemodialysis was restarted, and Endocrinology was consulted for aid with managing extremely labile blood sugars. Infectious cultures were negative, and the patient's antimicrobial regimen was de-escalated to Flagyl q8hrs (end date 4/4/2022). Her blood sugars were controlled w/ 1U Lantus nightly and a modified sliding scale.  She was successfully extubated, and has been tolerating room air well since that time.      On the floors patient has had difficulty in controlling blood glucose levels. Patient was on 1U Lantus w/ a modified sliding scale but still patient would have episodes of hypo and hyperglycemia. Patient complained of abdominal pain and nausea and would consistently not eat due to these symptoms. Patient was on Bentyl, Reglan, Zofran, Pepcid, Phenergan all with minimal relief. Patient was not eating much and on 4/4 and 4/5 patient's 1u Lantus was held despite being scheduled for nightly Lantus. BHB on 4/6 showed >4.50. Patient was treated on the floors with subq insulin and fluid. AG closed x2 and patient began feeling better. Patient still with episodes of hyperglycemia in 350s as well as hypoglycemia episodes. GI consulted with recs of starting patient on PPI BID as well as considering PEG-J tube at this time. Currently plans for patient to undergo PEG-J         Long standing dm1-- autonomic neuroapthy +gastroparesis  Very brittle dm - even low doses insulin can drop BS-- when NOT eating   ?1 unis (vs 2 too high) +low intensity SS    +lantus once daily  Attempting continuous monitoring       ACD - getting epo  Hormonal workup otherwise OK  Check zinc, cu etc.    Add thiamine IV  Refeeding syndrome-- repleting Mag/phos, K+    Sp peg J now  offf  IV fluids--tomorrow discuss water boluses Q4-6 thru J --  - weaned off IV +50cc free water Q6  Tolerating tube feeds 20-- to 30cc-- to 40cc  --now boluses  Tube feed Q4 hours    +oral eating now, better  Discussed wean off promethazine     Go home with mom-- mom wants to managed tube feeding at home- declining home health? ?  Plan bolus tube feeding for home  Giving more insulin now getting tube feed

## 2022-04-23 NOTE — PLAN OF CARE
Problem: Skin Integrity:  Goal: Will show no infection signs and symptoms  Description: Will show no infection signs and symptoms  Outcome: Progressing     Problem: Serum Glucose Level - Abnormal:  Goal: Ability to maintain appropriate glucose levels has stabilized  Description: Ability to maintain appropriate glucose levels has stabilized  Outcome: Not Progressing     Problem: Breathing Pattern - Ineffective:  Goal: Ability to achieve and maintain a regular respiratory rate will improve  Description: Ability to achieve and maintain a regular respiratory rate will improve  Outcome: Progressing     Problem: Diarrhea:  Goal: Bowel elimination is within specified parameters  Description: Bowel elimination is within specified parameters  Outcome: Not Progressing     Problem: Pain:  Goal: Pain level will decrease  Description: Pain level will decrease  Outcome: Progressing  Goal: Control of acute pain  Description: Control of acute pain  Outcome: Progressing

## 2022-04-23 NOTE — PROGRESS NOTES
ENDOCRINOLOGY PROGRESS NOTE      Date of admission: 3/27/2022  Date of service: 4/23/2022  Admitting physician: Hannah Noel DO   Primary Care Physician: Keyonna Abreu MD  Consultant physician: Rafael Prakash MD     Reason for the consultation:  DM type 1, labile diabetes     History of Present Illness: The history is provided from medical records, pt sedated intubated     Aster Hyde is a 36 y.o. old female nursing home resident with PMH of Type I DM, ESRD on PD,HTN,hypothyroidism, infective endocarditis and other listed below admitted to [facility] on 3/27/2022 because of AMS. Endocrine service was consulted for DM management.      subjective   Seen sand examined this AM, had low BG last night (<40), on bolus tube feeds     Point of care glucose monitoring (Independently reviewed)   Recent Labs     04/22/22  2142 04/23/22  0046 04/23/22  0055 04/23/22  0112 04/23/22  0230 04/23/22  0419 04/23/22  0629 04/23/22  0812   GLUMET 209* <40* <40* 110* 186* 262* 309* 436*     Scheduled Meds:   insulin regular  0-4 Units SubCUTAneous Q6H    acetaminophen  1,000 mg Oral 3 times per day    insulin regular  2 Units SubCUTAneous Q6H    insulin glargine  1 Units SubCUTAneous BID    lansoprazole  30 mg Per G Tube BID AC    thiamine  100 mg IntraVENous Daily    sodium chloride flush  5-40 mL IntraVENous 2 times per day    labetalol  10 mg IntraVENous Once    carvedilol  12.5 mg Oral BID    melatonin  6 mg Oral Nightly    heparin flush  1 mL IntraVENous 2 times per day    escitalopram  10 mg Oral Daily    ARIPiprazole  5 mg Oral Nightly    rOPINIRole  0.25 mg Oral Nightly    levothyroxine  88 mcg Oral Daily    epoetin nena-epbx  3,000 Units SubCUTAneous Once per day on Mon Wed Fri    Vitamin D  1,000 Units Per NG tube Daily     PRN Meds:   sodium chloride, , PRN  lidocaine, , PRN  promethazine, 25 mg, Q6H PRN  sodium chloride flush, 5-40 mL, PRN  sodium chloride, 25 mL, PRN  benzocaine, , BID PRN  [Held by provider] labetalol, 5 mg, Q6H PRN  glucose, 4 each, PRN  diphenhydrAMINE, 25 mg, Q6H PRN  magic (miracle) mouthwash, 5 mL, 4x Daily PRN  trimethobenzamide, 200 mg, Q6H PRN  loperamide, 2 mg, 4x Daily PRN  heparin flush, 1 mL, PRN  white petrolatum, , BID PRN  polyethylene glycol, 17 g, Daily PRN  dextrose, 12.5 g, PRN  glucagon (rDNA), 1 mg, PRN  dextrose, 100 mL/hr, PRN  albuterol, 2.5 mg, Q6H PRN      Continuous Infusions:   sodium chloride      sodium chloride      dextrose 100 mL/hr (04/20/22 2101)       Review of Systems  Non-obtainable     OBJECTIVE    BP (!) 156/98   Pulse 109   Temp 97.8 °F (36.6 °C) (Oral)   Resp 18   Ht 5' 4\" (1.626 m)   Wt 133 lb 9.6 oz (60.6 kg)   SpO2 98%   BMI 22.93 kg/m²     Intake/Output Summary (Last 24 hours) at 4/23/2022 1105  Last data filed at 4/23/2022 9546  Gross per 24 hour   Intake 770 ml   Output --   Net 770 ml     Physical examination:  Due to this being a TeleHealth encounter, evaluation of the following organ systems is limited: Vitals/Constitutional/EENT/Resp/CV/GI//MS/Neuro/Skin/Heme-Lymph-Imm. Modified physical exam through Telemedicine camera    Neck: no obvious neck mass. No obvious neck deformity     CVS: no distress   Chest: no distress. Chest is moving with respiration    Extremities:  no visible tremor  Skin: No visible rashes as seen from camera   Musculoskeletal: no visible deformity  Neuro: Alert and oriented to person, place, and time. Psychiatric: Normal mood and affect.  Behavior is normal    Review of Laboratory Data:  I personally reviewed the following labs:   Recent Labs     04/22/22  0543 04/22/22  1054 04/22/22  2225   WBC 5.3  --  6.1   RBC 2.80*  --  3.31*   HGB 6.9* 6.9* 8.1*   HCT 22.5* 22.6* 26.3*   MCV 80.4  --  79.5*   MCH 24.6*  --  24.5*   MCHC 30.7*  --  30.8*   RDW 23.0*  --  21.9*     --  213   MPV NOT CALC  --  9.8     Recent Labs     04/21/22 2138 04/22/22  0543 04/22/22 2225    137 143   K 4.4 4.3 4.1  102 109*   CO2 24 24 25   BUN 9 17 29*   CREATININE 1.5* 2.0* 2.8*   GLUCOSE 325* 140* 157*   CALCIUM 8.4* 8.7 8.9     Beta-Hydroxybutyrate   Date Value Ref Range Status   04/20/2022 0.06 0.02 - 0.27 mmol/L Final   04/17/2022 0.74 (H) 0.02 - 0.27 mmol/L Final   04/13/2022 2.56 (H) 0.02 - 0.27 mmol/L Final     Lab Results   Component Value Date    LABA1C 7.7 03/02/2022    LABA1C 8.7 12/08/2021    LABA1C 10.2 11/23/2021     Lab Results   Component Value Date/Time    TSH 5.370 (H) 03/28/2022 01:56 AM    T4FREE 1.10 03/28/2022 01:56 AM    D3AFDXG 8.6 11/05/2015 02:20 AM    FT3 1.3 (L) 10/31/2020 10:43 AM    S0ONIQZ 36.73 (L) 07/20/2020 02:00 PM     Lab Results   Component Value Date    LABA1C 7.7 03/02/2022    GLUCOSE 157 04/22/2022    GLUCOSE 130 05/18/2012    MALBCR 2949.3 01/15/2020    LABMICR 1740.1 01/15/2020    LABCREA 59 01/15/2020    LABCREA 61 01/15/2020     Lab Results   Component Value Date    TRIG 82 01/14/2020    HDL 33 01/14/2020    LDLCALC 46 01/14/2020    CHOL 95 01/14/2020       Blood culture   Lab Results   Component Value Date    BC 5 Days no growth 03/28/2022    BC 5 Days no growth 03/27/2022       Radiology:  XR CHEST PORTABLE   Final Result   Borderline cardiac size with vascular congestion. XR ABDOMEN (KUB) (SINGLE AP VIEW)   Final Result   No evidence of bowel obstruction. XR CHEST PORTABLE   Final Result   1. The lungs are clear. There is no infiltrate or effusion. 2. Stable position of the support lines and tubes. XR CHEST PORTABLE   Final Result   1. There is no acute cardiopulmonary disease   2. Stable position of the support lines and tubes. XR CHEST PORTABLE   Final Result   Infiltrate and or atelectasis at the left lower lobe. Substantially resolved   infiltrate and or atelectasis on the right. New NG tube. CT HEAD WO CONTRAST   Final Result   No acute intracranial abnormality or hemorrhage.          CT ABDOMEN PELVIS WO CONTRAST Additional Contrast? None   Final Result   1. Moderate ascites throughout abdomen and pelvis. 2.  Multiple thick walled loops of small bowel throughout the abdomen could   suggest nonspecific infectious or inflammatory enteritis. 3.  Generalized body wall edema. 4.  Small bilateral pleural effusions with adjacent atelectatic changes. XR ABDOMEN FOR NG/OG/NE TUBE PLACEMENT   Final Result   Enteric tube tip in the body of the stomach. XR CHEST PORTABLE   Final Result   Right perihilar opacity. Medical Records/Labs/Images review:   I personally reviewed and summarized previous records   All labs and imaging were reviewed independently     Mary Maldonado, a 36 y.o.-old female seen today for inpatient diabetes management     Diabetes Mellitus type I    · Pt's diabetes is very brittle and very erratic   · On bolus tube feeds   · we recommend the following sq insulin regimen   · lantus 1u BID  · 2 units Regular insulin + Low dose sliding scale q6hrs to cover bolus tube feeds  · Continue following BG and adjust insulin regimen  · I tried to get her freestyle Isabel CGM this morning but her phone wasn't compatible. Will get her a scanner tomorrow morning   · Pt will also benefit from insulin pump      Failure to thrive   · On tube feeds   · Management per primary team     Primary Hypothyroidism  · Levothyroxine was adjusted during this admission to 88 mcg daily      ESRD  · Pt at risk for hypoglycemia  · On dialysis, nephrology following    Thank you for allowing us to participate in the care of this patient. Please do not hesitate to contact us with any additional questions. Abdias Chavez MD  Endocrinologist, [facility] End[facility]   [address] [facility] [address]   Phone: [phone]  Fax: [phone]  ----------------------------  An electronic signature was used to authenticate this note.  Kevin Rojas MD on 4/23/2022 at 11:05 AM

## 2022-04-23 NOTE — FLOWSHEET NOTE
04/23/22 1648   Vital Signs   /60   Temp 98.6 °F (37 °C)   Pulse 98   Weight 129 lb 13.6 oz (58.9 kg)   Weight Method Bed scale   Percent Weight Change -2.81   Post-Hemodialysis Assessment   Post-Treatment Procedures Blood returned;Catheter capped, clamped and heparinized x 2 ports   Machine Disinfection Process Acid/Vinegar Clean;Heat Disinfect;Bleach; Exterior Machine Disinfection   Rinseback Volume (ml) 300 ml   Total Liters Processed (l/min) 78.5 l/min   Dialyzer Clearance Lightly streaked   Duration of Treatment (minutes) 210 minutes   Heparin amount administered during treatment (units) 0 units   Hemodialysis Intake (ml) 300 ml   Hemodialysis Output (ml) 2000 ml   NET Removed (ml) 1700 ml   Tolerated Treatment Good   Patient Response to Treatment tolerated well, blood returned, cath care per policy/procedure, lines flushed, heparin instilled, ports capped

## 2022-04-24 LAB
METER GLUCOSE: 259 MG/DL (ref 74–99)
METER GLUCOSE: 273 MG/DL (ref 74–99)
METER GLUCOSE: 288 MG/DL (ref 74–99)
METER GLUCOSE: 289 MG/DL (ref 74–99)
METER GLUCOSE: 58 MG/DL (ref 74–99)
METER GLUCOSE: 60 MG/DL (ref 74–99)
ZINC: 68.4 UG/DL (ref 60–120)

## 2022-04-24 PROCEDURE — 6360000002 HC RX W HCPCS: Performed by: INTERNAL MEDICINE

## 2022-04-24 PROCEDURE — 6370000000 HC RX 637 (ALT 250 FOR IP): Performed by: STUDENT IN AN ORGANIZED HEALTH CARE EDUCATION/TRAINING PROGRAM

## 2022-04-24 PROCEDURE — 82962 GLUCOSE BLOOD TEST: CPT

## 2022-04-24 PROCEDURE — S5553 INSULIN LONG ACTING 5 U: HCPCS | Performed by: INTERNAL MEDICINE

## 2022-04-24 PROCEDURE — 6370000000 HC RX 637 (ALT 250 FOR IP): Performed by: NURSE PRACTITIONER

## 2022-04-24 PROCEDURE — 6370000000 HC RX 637 (ALT 250 FOR IP): Performed by: INTERNAL MEDICINE

## 2022-04-24 PROCEDURE — 1200000000 HC SEMI PRIVATE

## 2022-04-24 PROCEDURE — 99232 SBSQ HOSP IP/OBS MODERATE 35: CPT | Performed by: INTERNAL MEDICINE

## 2022-04-24 PROCEDURE — 2580000003 HC RX 258: Performed by: STUDENT IN AN ORGANIZED HEALTH CARE EDUCATION/TRAINING PROGRAM

## 2022-04-24 PROCEDURE — 6360000002 HC RX W HCPCS: Performed by: STUDENT IN AN ORGANIZED HEALTH CARE EDUCATION/TRAINING PROGRAM

## 2022-04-24 PROCEDURE — 99233 SBSQ HOSP IP/OBS HIGH 50: CPT | Performed by: INTERNAL MEDICINE

## 2022-04-24 RX ADMIN — LEVOTHYROXINE SODIUM 88 MCG: 0.09 TABLET ORAL at 08:35

## 2022-04-24 RX ADMIN — DIPHENHYDRAMINE HYDROCHLORIDE 25 MG: 50 INJECTION, SOLUTION INTRAMUSCULAR; INTRAVENOUS at 14:35

## 2022-04-24 RX ADMIN — DIPHENHYDRAMINE HYDROCHLORIDE 25 MG: 50 INJECTION, SOLUTION INTRAMUSCULAR; INTRAVENOUS at 00:26

## 2022-04-24 RX ADMIN — LANSOPRAZOLE 30 MG: KIT at 08:43

## 2022-04-24 RX ADMIN — ROPINIROLE 0.25 MG: 0.25 TABLET, FILM COATED ORAL at 20:59

## 2022-04-24 RX ADMIN — INSULIN GLARGINE-YFGN 1 UNITS: 100 INJECTION, SOLUTION SUBCUTANEOUS at 08:51

## 2022-04-24 RX ADMIN — DIPHENHYDRAMINE HYDROCHLORIDE 25 MG: 50 INJECTION, SOLUTION INTRAMUSCULAR; INTRAVENOUS at 20:55

## 2022-04-24 RX ADMIN — Medication 1 UNITS: at 19:08

## 2022-04-24 RX ADMIN — Medication 10 ML: at 22:12

## 2022-04-24 RX ADMIN — Medication 6 MG: at 20:58

## 2022-04-24 RX ADMIN — DIPHENHYDRAMINE HYDROCHLORIDE 25 MG: 50 INJECTION, SOLUTION INTRAMUSCULAR; INTRAVENOUS at 08:34

## 2022-04-24 RX ADMIN — ACETAMINOPHEN 1000 MG: 500 TABLET ORAL at 21:00

## 2022-04-24 RX ADMIN — THIAMINE HYDROCHLORIDE 100 MG: 100 INJECTION, SOLUTION INTRAMUSCULAR; INTRAVENOUS at 08:36

## 2022-04-24 RX ADMIN — ACETAMINOPHEN 1000 MG: 500 TABLET ORAL at 13:08

## 2022-04-24 RX ADMIN — LANSOPRAZOLE 30 MG: KIT at 16:11

## 2022-04-24 RX ADMIN — CARVEDILOL 12.5 MG: 6.25 TABLET, FILM COATED ORAL at 08:35

## 2022-04-24 RX ADMIN — SODIUM CHLORIDE, PRESERVATIVE FREE 100 UNITS: 5 INJECTION INTRAVENOUS at 21:02

## 2022-04-24 RX ADMIN — INSULIN GLARGINE-YFGN 1 UNITS: 100 INJECTION, SOLUTION SUBCUTANEOUS at 21:30

## 2022-04-24 RX ADMIN — Medication 1000 UNITS: at 08:35

## 2022-04-24 RX ADMIN — Medication 1 UNITS: at 08:51

## 2022-04-24 RX ADMIN — ARIPIPRAZOLE 5 MG: 5 TABLET ORAL at 20:59

## 2022-04-24 RX ADMIN — ACETAMINOPHEN 1000 MG: 500 TABLET ORAL at 06:01

## 2022-04-24 RX ADMIN — Medication 10 ML: at 08:36

## 2022-04-24 RX ADMIN — Medication 2 UNITS: at 12:30

## 2022-04-24 RX ADMIN — ESCITALOPRAM 10 MG: 10 TABLET, FILM COATED ORAL at 08:35

## 2022-04-24 RX ADMIN — Medication 4 EACH: at 01:39

## 2022-04-24 RX ADMIN — SODIUM CHLORIDE, PRESERVATIVE FREE 100 UNITS: 5 INJECTION INTRAVENOUS at 08:37

## 2022-04-24 RX ADMIN — CARVEDILOL 12.5 MG: 6.25 TABLET, FILM COATED ORAL at 20:59

## 2022-04-24 ASSESSMENT — PAIN DESCRIPTION - DESCRIPTORS
DESCRIPTORS: ACHING;DISCOMFORT
DESCRIPTORS: ACHING;DISCOMFORT

## 2022-04-24 ASSESSMENT — PAIN SCALES - GENERAL
PAINLEVEL_OUTOF10: 0
PAINLEVEL_OUTOF10: 5
PAINLEVEL_OUTOF10: 5

## 2022-04-24 ASSESSMENT — PAIN DESCRIPTION - ORIENTATION
ORIENTATION: MID
ORIENTATION: MID

## 2022-04-24 ASSESSMENT — PAIN DESCRIPTION - LOCATION
LOCATION: ABDOMEN
LOCATION: ABDOMEN

## 2022-04-24 NOTE — PROGRESS NOTES
ENDOCRINOLOGY PROGRESS NOTE      Date of admission: 3/27/2022  Date of service: 4/24/2022  Admitting physician: Xochitl Morel DO   Primary Care Physician: Claudia Montelongo MD  Consultant physician: Juleen Dubin MD     Reason for the consultation:  DM type 1, labile diabetes     History of Present Illness: The history is provided from medical records, pt sedated intubated     Radha Rowland is a 36 y.o. old female nursing home resident with PMH of Type I DM, ESRD on PD,HTN,hypothyroidism, infective endocarditis and other listed below admitted to [facility] on 3/27/2022 because of AMS. Endocrine service was consulted for DM management.      subjective   Seen sand examined this AM, she is more awake today then before, had BG of 30 yesterday evening     Point of care glucose monitoring (Independently reviewed)   Recent Labs     04/23/22  0812 04/23/22  1130 04/23/22  1611 04/23/22  1834 04/23/22  2005 04/24/22  0113 04/24/22  0137 04/24/22  0604   GLUMET 436* 444* 73* <40* 184* 58* 60* 273*     Scheduled Meds:   insulin regular  0-4 Units SubCUTAneous Q6H    acetaminophen  1,000 mg Oral 3 times per day    [Held by provider] insulin regular  2 Units SubCUTAneous Q6H    insulin glargine  1 Units SubCUTAneous BID    lansoprazole  30 mg Per G Tube BID AC    thiamine  100 mg IntraVENous Daily    sodium chloride flush  5-40 mL IntraVENous 2 times per day    labetalol  10 mg IntraVENous Once    carvedilol  12.5 mg Oral BID    melatonin  6 mg Oral Nightly    heparin flush  1 mL IntraVENous 2 times per day    escitalopram  10 mg Oral Daily    ARIPiprazole  5 mg Oral Nightly    rOPINIRole  0.25 mg Oral Nightly    levothyroxine  88 mcg Oral Daily    epoetin nena-epbx  3,000 Units SubCUTAneous Once per day on Mon Wed Fri    Vitamin D  1,000 Units Per NG tube Daily     PRN Meds:   sodium chloride, , PRN  lidocaine, , PRN  promethazine, 25 mg, Q6H PRN  sodium chloride flush, 5-40 mL, PRN  sodium chloride, 25 mL, PRN  benzocaine, , BID PRN  [Held by provider] labetalol, 5 mg, Q6H PRN  glucose, 4 each, PRN  diphenhydrAMINE, 25 mg, Q6H PRN  magic (miracle) mouthwash, 5 mL, 4x Daily PRN  trimethobenzamide, 200 mg, Q6H PRN  loperamide, 2 mg, 4x Daily PRN  heparin flush, 1 mL, PRN  white petrolatum, , BID PRN  polyethylene glycol, 17 g, Daily PRN  dextrose, 12.5 g, PRN  glucagon (rDNA), 1 mg, PRN  dextrose, 100 mL/hr, PRN  albuterol, 2.5 mg, Q6H PRN      Continuous Infusions:   sodium chloride      sodium chloride      dextrose 100 mL/hr (04/20/22 2101)       Review of Systems  Non-obtainable     OBJECTIVE    BP (!) 150/90   Pulse 102   Temp 98.9 °F (37.2 °C) (Oral)   Resp 18   Ht 5' 4\" (1.626 m)   Wt 129 lb 13.6 oz (58.9 kg)   SpO2 100%   BMI 22.29 kg/m²     Intake/Output Summary (Last 24 hours) at 4/24/2022 0914  Last data filed at 4/23/2022 1648  Gross per 24 hour   Intake 300 ml   Output 2000 ml   Net -1700 ml     Physical examination:  General: awake alert, oriented x3  HEENT: normocephalic non traumatic, no exophthalmos   Neck: supple, thyroid tenderness,  Pulm: Clear equal air entry no added sounds  CVS: S1 + S2  Abd: soft lax, no tenderness  Skin: warm, no lesions, no rash. No open wounds, no ulcers   Neuro: CN intact, sensation decreased bilateral    Psych: normal mood, and affect    Review of Laboratory Data:  I personally reviewed the following labs:   Recent Labs     04/22/22  0543 04/22/22  0543 04/22/22  1054 04/22/22  2225 04/23/22  1207   WBC 5.3  --   --  6.1 6.8   RBC 2.80*  --   --  3.31* 3.36*   HGB 6.9*   < > 6.9* 8.1* 8.2*   HCT 22.5*   < > 22.6* 26.3* 27.4*   MCV 80.4  --   --  79.5* 81.5   MCH 24.6*  --   --  24.5* 24.4*   MCHC 30.7*  --   --  30.8* 29.9*   RDW 23.0*  --   --  21.9* 21.9*     --   --  213 221   MPV NOT CALC  --   --  9.8 9.8    < > = values in this interval not displayed.      Recent Labs     04/22/22  0543 04/22/22  0543 04/22/22  2225 04/23/22  1207 04/23/22  1847   NA 137  --  143 138  --    K 4.3  --  4.1 4.4  --      --  109* 106  --    CO2 24  --  25 19*  --    BUN 17  --  29* 38*  --    CREATININE 2.0*  --  2.8* 3.3*  --    GLUCOSE 140*   < > 157* 398* 30*   CALCIUM 8.7  --  8.9 8.9  --     < > = values in this interval not displayed. Beta-Hydroxybutyrate   Date Value Ref Range Status   04/20/2022 0.06 0.02 - 0.27 mmol/L Final   04/17/2022 0.74 (H) 0.02 - 0.27 mmol/L Final   04/13/2022 2.56 (H) 0.02 - 0.27 mmol/L Final     Lab Results   Component Value Date    LABA1C 7.7 03/02/2022    LABA1C 8.7 12/08/2021    LABA1C 10.2 11/23/2021     Lab Results   Component Value Date/Time    TSH 5.370 (H) 03/28/2022 01:56 AM    T4FREE 1.10 03/28/2022 01:56 AM    [id number] 8.6 11/05/2015 02:20 AM    FT3 1.3 (L) 10/31/2020 10:43 AM    S8PTUYY 36.73 (L) 07/20/2020 02:00 PM     Lab Results   Component Value Date    LABA1C 7.7 03/02/2022    GLUCOSE 30 04/23/2022    GLUCOSE 130 05/18/2012    MALBCR 2949.3 01/15/2020    LABMICR 1740.1 01/15/2020    LABCREA 59 01/15/2020    LABCREA 61 01/15/2020     Lab Results   Component Value Date    TRIG 82 01/14/2020    HDL 33 01/14/2020    LDLCALC 46 01/14/2020    CHOL 95 01/14/2020       Blood culture   Lab Results   Component Value Date    BC 5 Days no growth 03/28/2022    BC 5 Days no growth 03/27/2022       Radiology:  XR CHEST PORTABLE   Final Result   Borderline cardiac size with vascular congestion. XR ABDOMEN (KUB) (SINGLE AP VIEW)   Final Result   No evidence of bowel obstruction. XR CHEST PORTABLE   Final Result   1. The lungs are clear. There is no infiltrate or effusion. 2. Stable position of the support lines and tubes. XR CHEST PORTABLE   Final Result   1. There is no acute cardiopulmonary disease   2. Stable position of the support lines and tubes. XR CHEST PORTABLE   Final Result   Infiltrate and or atelectasis at the left lower lobe.   Substantially resolved   infiltrate and or atelectasis on the right.  New NG tube. CT HEAD WO CONTRAST   Final Result   No acute intracranial abnormality or hemorrhage. CT ABDOMEN PELVIS WO CONTRAST Additional Contrast? None   Final Result   1. Moderate ascites throughout abdomen and pelvis. 2.  Multiple thick walled loops of small bowel throughout the abdomen could   suggest nonspecific infectious or inflammatory enteritis. 3.  Generalized body wall edema. 4.  Small bilateral pleural effusions with adjacent atelectatic changes. XR ABDOMEN FOR NG/OG/NE TUBE PLACEMENT   Final Result   Enteric tube tip in the body of the stomach. XR CHEST PORTABLE   Final Result   Right perihilar opacity. Medical Records/Labs/Images review:   I personally reviewed and summarized previous records   All labs and imaging were reviewed independently     [address] Maldonado, a 36 y.o.-old female seen today for inpatient diabetes management     Diabetes Mellitus type I    · Given her very brittle and erratic diabetes, and given ESRD which will affect insulin pharmacokinetics, I think her Blood glucose will be more stable on continuous fixed rate on tube feeds with fixed insulin regimen   · we recommend the following sq insulin regimen   · lantus 1u BID  · Low dose sliding scale q6hrs to cover bolus tube feeds  · Continue following BG and adjust insulin regimen  · We provided her freestyle Isabel CGM this morning, low alerts will be at 85      Failure to thrive   · On tube feeds   · Management per primary team     Primary Hypothyroidism  · Levothyroxine was adjusted during this admission to 88 mcg daily      ESRD  · Pt at risk for hypoglycemia  · On dialysis, nephrology following    Thank you for allowing us to participate in the care of this patient. Please do not hesitate to contact us with any additional questions.      Carissa Elias MD  Endocrinologist, [facility] Diabetes Care and Endocrinology   [address] [facility], [address] [place] [address],[address] [id number]   Phone: [phone]  Fax: [phone]  ----------------------------  An electronic signature was used to authenticate this note.  Maddie Tom MD on 4/24/2022 at 9:14 AM

## 2022-04-24 NOTE — PROGRESS NOTES
[facility]  Internal Medicine Residency Program  Progress Note - House Team     Patient:  Cherlynn Severance 36 y.o. female MRN: [id number]     Date of Service: 4/23/2022     CC: AMS, hypoglycemia  Overnight events: hypoglycemia    Subjective     Overnight patient's glucose dropped to 30 from 400 and was treated with 1 glucose tablet, apple juice and a bolus of tube feeds. Scheduled regular insulin was held. Patient was asymptomatic and eating mac and cheese. Repeat  an hour later. Scheduled regular insulin was resumed. Patient was seen and examined this morning at bedside in no acute distress but still looking weak and frail. She has abdominal pain at the site of PEG placement but denies nausea. Spoke with endo and recommendations are for continuous tube feeds. Patient was seen at bedside with continuous glucose monitoring devise on. Objective     Physical Exam:  · Vitals: /60   Pulse 98   Temp 98.6 °F (37 °C)   Resp 16   Ht 5' 4\" (1.626 m)   Wt 129 lb 13.6 oz (58.9 kg)   SpO2 98%   BMI 22.29 kg/m²     · I & O - 24hr: No intake/output data recorded. · General Appearance: awake, weak and frail looking but coooperative  · HEENT:  Head: Normal, normocephalic, atraumatic. · Neck: no carotid bruit, no JVD and supple, symmetrical, trachea midline  · Lung: clear to auscultation bilaterally, no rhonchi, crackes or rales, equal chest expansion  · Heart: regular rate and rhythm, S1, S2 normal, no murmur, click, rub or gallop  · Abdomen: soft, sore; bowel sounds normal; no masses,  no organomegaly, abdominal binder in place  · Extremities:  extremities normal, atraumatic, no cyanosis or edema, +1 pulses  · Musculokeletal: No joint swelling, no muscle tenderness. ROM normal in all joints of extremities.  Yanna's negative bilaterally  · Neurologic: Mental status: Alert, oriented x3, thought content appropriate, CN 2-12 grossly intact  Subject  Pertinent Labs & Imaging Studies jorge alberto  CBC with Differential:    Lab Results   Component Value Date    WBC 6.8 04/23/2022    RBC 3.36 04/23/2022    HGB 8.2 04/23/2022    HCT 27.4 04/23/2022     04/23/2022    MCV 81.5 04/23/2022    MCH 24.4 04/23/2022    MCHC 29.9 04/23/2022    RDW 21.9 04/23/2022    NRBC 0.9 03/28/2022    SEGSPCT 67 06/27/2013    METASPCT 1.7 08/10/2020    LYMPHOPCT 14.6 04/23/2022    MONOPCT 3.4 04/23/2022    MYELOPCT 0.9 01/19/2022    BASOPCT 0.9 04/23/2022    MONOSABS 0.23 04/23/2022    LYMPHSABS 0.99 04/23/2022    EOSABS 0.10 04/23/2022    BASOSABS 0.06 04/23/2022     CMP:    Lab Results   Component Value Date     04/23/2022    K 4.4 04/23/2022    K 4.0 04/14/2022     04/23/2022    CO2 19 04/23/2022    BUN 38 04/23/2022    CREATININE 3.3 04/23/2022    GFRAA 20 04/23/2022    LABGLOM 20 04/23/2022    GLUCOSE 30 04/23/2022    GLUCOSE 130 05/18/2012    PROT 7.0 04/19/2022    LABALBU 3.6 04/19/2022    LABALBU 4.1 05/18/2012    CALCIUM 8.9 04/23/2022    BILITOT 0.4 04/19/2022    ALKPHOS 157 04/19/2022    AST 11 04/19/2022    ALT 5 04/19/2022     Magnesium:    Lab Results   Component Value Date    MG 2.2 04/23/2022     Phosphorus:    Lab Results   Component Value Date    PHOS 2.2 04/23/2022       XR CHEST PORTABLE   Final Result   Borderline cardiac size with vascular congestion. XR ABDOMEN (KUB) (SINGLE AP VIEW)   Final Result   No evidence of bowel obstruction. XR CHEST PORTABLE   Final Result   1. The lungs are clear. There is no infiltrate or effusion. 2. Stable position of the support lines and tubes. XR CHEST PORTABLE   Final Result   1. There is no acute cardiopulmonary disease   2. Stable position of the support lines and tubes. XR CHEST PORTABLE   Final Result   Infiltrate and or atelectasis at the left lower lobe. Substantially resolved   infiltrate and or atelectasis on the right. New NG tube.          CT HEAD WO CONTRAST   Final Result   No acute intracranial abnormality or hemorrhage. CT ABDOMEN PELVIS WO CONTRAST Additional Contrast? None   Final Result   1. Moderate ascites throughout abdomen and pelvis. 2.  Multiple thick walled loops of small bowel throughout the abdomen could   suggest nonspecific infectious or inflammatory enteritis. 3.  Generalized body wall edema. 4.  Small bilateral pleural effusions with adjacent atelectatic changes. XR ABDOMEN FOR NG/OG/NE TUBE PLACEMENT   Final Result   Enteric tube tip in the body of the stomach. XR CHEST PORTABLE   Final Result   Right perihilar opacity. Resident's Assessment and Plan     Assessment and Plan:    1. Hypersensitive IDDM1-A1c 7.7% this admission  2. Nausea/Vomiting w/ Abdominal pain with Hx of diabetic gastroparesis s/p PEG-J 4/21  3. HTN, on coreg BID  4. ESRD on HD TTS  5. Hypothyroidism-TSH 5.37, FT4 1.10 normal  6. Acute on chronic anemia-s/p 4 unit PRBCs, chronic component likely due to ESRD  7. History of MDRO UTI  8. History of C. difficile infection  9. Oral Candidiasis  10. DKA - resolved  11. Acute hypoxic respiratory failure - resolved  12. Acute metabolic encephalopathy-likely due to hypoglycemia in setting of DM1 and ESRD (CTH negative, negative UDS/SDS, NH3 normal, B12 normal 1 month ago) - resolved  13.  Aspiration pneumonia - resolved     Plan:  · Post transfusion H/H 8.1  · Continue lansoprazole BID, phenergan 25 mg TID PRN to aid with abdominal pain/dyspepsia  · Per Endocrine, 1U lantus BID with regular insulin modified SSI (0-4 for 100>180) q6h  · 2U regular insulin with bolus feeds on hold 2/2 hypoglycemia overnight              - POCT q2h              - Maintain patient on bolus TF as continuous TF will likely affect patient's daily living significantly and currently patient already seems to have little motivation to ambulate               - Continue regular diet as tolerated              - Monitor daily BMP, Mg, Phos  · Continue home synthroid 88 mcg daily, abilify 5 mg nightly, and lexapro 10 mg daily, ropinirole 0.25 nightly  · Continue Coreg 12.5 mg BID  · Continue oral mouth wash and orajel  · Pain control with tylenol scheduled and lidocaine PRN for PEG-J site. · HD TThS per nephro, continue retacrit  · DME order placed for wheelchair for home going. Patient has been hospitalized for more than 20 days and has had deconditioning due to her condition. Due to increased weakness, patient will require a wheelchair for transport and mobility at home.     PT/OT evaluation: not indicated  DVT prophylaxis/ GI prophylaxis: SCD/lansoprazole  Disposition: continue current care    Brad Manjarrez MD, PGY-1  Attending physician: Dr. Sanam Gagnon    Attending Physician Statement:  Winter Tyson M.D., F.A.C.P.     I have discussed the case, including pertinent history and exam findings with the resident/NP. I have seen and examined the patient and the key elements of the encounter have been performed by me.  I agree with the resident ROS, PMHx, PSHx, meds reviewed and assessment, plan and orders as documented by the resident/NP [facility] charts reviewed, including other providers notes, relevant labs and imaging. >50% of time spent coordinating care with other providers and/or counseling patient/family  Remainder of medical problems as per resident note.  [place] Course:   The pt is a 35 y/o female with a PMHx of brittle T1DM, ESRD on HD, HTN, recent C. Diff infection, and endocarditis w/ consequent septic emboli formation, who presented to the ED from her long-term care facility and was hospitalized on 3/28/2022. Her presenting complaint was acute encephalopathy. Per note review, the pt's LKW was 1100 on 3/28/2022. She was found lethartic and altered at approx. 1700 by NH staff, and subsequent POCT BG was too low to be read by the glucometer. EMS was called, and the pt was transported emergently to [address] [place] [address].  On arrival, she was intubated for airway protection, and was transferred to the MICU. On arrival, she was tachycardic, hypertensive, afebrile, O2 saturation 100% on the ventilator. Pro-BNP and troponin were elevated, and labs were also notable for anemia to 6.5 (baseline 6.0-8.0). UA showing pyuria and hematuria, moderate bacteria, and large LE. CXR showing possible infiltrate/edema, and CT abdomen/pelvis concerning for possible enteritis. CT head (-)ve.      Once in the MICU, the pt was started on empiric antibiotic treatment for possible aspiration pneumonia/pneumonitis, as well as suspected enteritis. Hemodialysis was restarted, and Endocrinology was consulted for aid with managing extremely labile blood sugars. Infectious cultures were negative, and the patient's antimicrobial regimen was de-escalated to Flagyl q8hrs (end date 4/4/2022). Her blood sugars were controlled w/ 1U Lantus nightly and a modified sliding scale. She was successfully extubated, and has been tolerating room air well since that time.      On the floors patient has had difficulty in controlling blood glucose levels. Patient was on 1U Lantus w/ a modified sliding scale but still patient would have episodes of hypo and hyperglycemia. Patient complained of abdominal pain and nausea and would consistently not eat due to these symptoms. Patient was on Bentyl, Reglan, Zofran, Pepcid, Phenergan all with minimal relief. Patient was not eating much and on 4/4 and 4/5 patient's 1u Lantus was held despite being scheduled for nightly Lantus. BHB on 4/6 showed >4.50. Patient was treated on the floors with subq insulin and fluid. AG closed x2 and patient began feeling better. Patient still with episodes of hyperglycemia in 350s as well as hypoglycemia episodes. GI consulted with recs of starting patient on PPI BID as well as considering PEG-J tube at this time.  Currently plans for patient to undergo PEG-J    ACD - getting epo  Hormonal workup otherwise OK  Check zinc, cu etc.    Add thiamine IV  Refeeding syndrome-- repleting Mag/phos, K+  bc still getting daily IV mag and phos  Will now plan scheduled PO mag  And Phos, dec lab draws soon     Long standing dm1-- autonomic neuroapthy +gastroparesis  Very brittle dm - even low doses insulin can drop BS-- when NOT eating   ?1 unis (vs 2 too high) +low intensity SS    +lantus once daily  NOW Attempting continuous monitoring- at bedside 289 this am off that unit   I agree to stop schduled 1-2 units, only give SS with each bolus  +lantus low dose 1 unit QD or BID        Sp peg J now  --  - weaned off IV +50cc free water Q6  Tolerating tube feeds  QID bolus feeding     --we chose to continue bolus feeding rather than continuous feeding-- for easier lifestyle/mobilitiy -- leave house etc..    +oral eating now, better  Discussed wean off promethazine     Go home with mom-- mom wants to managed tube feeding at home- declining home health? ?  Plan bolus tube feeding for home  Giving only SS insulin with bolus tube feed  DC home tomrrow if BS less low/no hypoglycemic events,  BS acceptable for now

## 2022-04-24 NOTE — PROGRESS NOTES
Department of Internal Medicine  Nephrology Progress Note      Events reviewed. SUBJECTIVE:  We are following Miss Iam Qiu for ESRD on HD. Patient reports her abdomen is slightly sore. Up in bed eating breakfast.     PHYSICAL EXAM:      Vitals:    VITALS:  BP (!) 150/90   Pulse 102   Temp 98.9 °F (37.2 °C) (Oral)   Resp 18   Ht 5' 4\" (1.626 m)   Wt 129 lb 13.6 oz (58.9 kg)   SpO2 100%   BMI 22.29 kg/m²   24HR INTAKE/OUTPUT:      Intake/Output Summary (Last 24 hours) at 4/24/2022 1045  Last data filed at 4/23/2022 1648  Gross per 24 hour   Intake 300 ml   Output 2000 ml   Net -1700 ml       Access: RIJ tunneled dialysis catheter  Constitutional: Awake, alert, NAD more communicative this am  HEENT:  PERRL, normocephalic, atraumatic  Respiratory:  Few crackles posteriorly   Cardiovascular/Edema:  S1/S2 RRR, no murmur gallop or rub  Gastrointestinal:  soft, tenderness, PEG-J tube noted.  TF running  Neurologic: A&OX3 follows commands, no focal deficit  Skin:  Warm, dry, ecchymotic areas to abdomen  Other: no edema    Scheduled Meds:   insulin regular  0-4 Units SubCUTAneous Q6H    acetaminophen  1,000 mg Oral 3 times per day    [Held by provider] insulin regular  2 Units SubCUTAneous Q6H    insulin glargine  1 Units SubCUTAneous BID    lansoprazole  30 mg Per G Tube BID AC    thiamine  100 mg IntraVENous Daily    sodium chloride flush  5-40 mL IntraVENous 2 times per day    labetalol  10 mg IntraVENous Once    carvedilol  12.5 mg Oral BID    melatonin  6 mg Oral Nightly    heparin flush  1 mL IntraVENous 2 times per day    escitalopram  10 mg Oral Daily    ARIPiprazole  5 mg Oral Nightly    rOPINIRole  0.25 mg Oral Nightly    levothyroxine  88 mcg Oral Daily    epoetin nena-epbx  3,000 Units SubCUTAneous Once per day on Mon Wed Fri    Vitamin D  1,000 Units Per NG tube Daily     Continuous Infusions:   sodium chloride      sodium chloride      dextrose 100 mL/hr (04/20/22 2101)     PRN Meds:.sodium chloride, lidocaine, promethazine, sodium chloride flush, sodium chloride, benzocaine, [Held by provider] labetalol, glucose, diphenhydrAMINE, magic (miracle) mouthwash, trimethobenzamide, loperamide, heparin flush, white petrolatum, polyethylene glycol, dextrose, glucagon (rDNA), dextrose, albuterol    DATA:    CBC:   Lab Results   Component Value Date    WBC 6.8 04/23/2022    RBC 3.36 04/23/2022    HGB 8.2 04/23/2022    HCT 27.4 04/23/2022    MCV 81.5 04/23/2022    MCH 24.4 04/23/2022    MCHC 29.9 04/23/2022    RDW 21.9 04/23/2022     04/23/2022    MPV 9.8 04/23/2022     CMP:    Lab Results   Component Value Date     04/23/2022    K 4.4 04/23/2022    K 4.0 04/14/2022     04/23/2022    CO2 19 04/23/2022    BUN 38 04/23/2022    CREATININE 3.3 04/23/2022    GFRAA 20 04/23/2022    LABGLOM 20 04/23/2022    GLUCOSE 30 04/23/2022    GLUCOSE 130 05/18/2012    PROT 7.0 04/19/2022    LABALBU 3.6 04/19/2022    LABALBU 4.1 05/18/2012    CALCIUM 8.9 04/23/2022    BILITOT 0.4 04/19/2022    ALKPHOS 157 04/19/2022    AST 11 04/19/2022    ALT 5 04/19/2022     Magnesium:    Lab Results   Component Value Date    MG 2.2 04/23/2022     Phosphorus:    Lab Results   Component Value Date    PHOS 2.2 04/23/2022     Radiology Review:      CT Head WO Contrast March 27, 2022   No acute intracranial abnormality or hemorrhage. CT Abdomen Pelvis WO Contrast March 27, 2022   1. Moderate ascites throughout abdomen and pelvis. 2.  Multiple thick walled loops of small bowel throughout the abdomen could   suggest nonspecific infectious or inflammatory enteritis. 3.  Generalized body wall edema. 4.  Small bilateral pleural effusions with adjacent atelectatic changes. Chest X-Ray March 28, 2022   Infiltrate and or atelectasis at the left lower lobe. Substantially resolved   infiltrate and or atelectasis on the right. New NG tube.            BRIEF SUMMARY OF INITIAL CONSULT:    Briefly, Miss Scotty Alfonso is a 36year-old female with a PMH of ESRD on hemodialysis 3 days/wk, on TTS schedule at [facility], Type I DM, poorly controlled with recurrent admissions for DKA, HTN, gastroparesis, ascites, infective endocarditis, cardiac arrest, hypothyroidism, recent Covid, and depression who was admitted on March 27, 2022 after she was found unresponsive at the SNF where she resides. Patient was found to be profoundly hypoglycemic and EMS was called. She was intubated in ER for airway protection as she remained unresponsive. CT head showed no acute intracranial abnormality or hemorrhage, chest x-ray demonstrates right perihilar opacity concerning for aspiration pneumonia. She was ultimately admitted to MICU for further evaluation. Labs on admission were significant for sodium 135, potassium 5.0, chloride 100, bicarbonate 25, BUN 38, creatinine 4.9, proBNP >70,000. We are consulted for dialysis management. Problems resolved. · Hypoglycemia, was on D10, now hyperglycemic with +ketones (beta-hydroxybutyrate >4.5)  · Acute respiratory failure, 2/2 aspiration pneumonia? On 40% Fio2. Extubated 9/05  · Acute metabolic encephalopathy 2/2 hypoglycemia. Resolving   · Acidemia, pH 7.311, PCO2 35.0, HCO3 43.9, metabolic acidosis from DKA  · Hx recurrent C. difficile infection, on flagyl po Q 8hrs   · Hx of MDRO UTI  · Aspiration pneumonia? S/p piperacillin  · Hypoglycemia, to start D10 with 150 mEq of sodium chloride at 60 cc/hour    · Hypomagnesemia 2/2 poor oral intake, to replace  · Hypophosphatemia 2/2 poor oral intake, improving, phos 3.0    IMPRESSION/RECOMMENDATIONS:      1. ESKD 3 times a week TTS via RIJ tunneled dialysis catheter. For HD tomorrow. 2. HTN, with orthostatic hypotension, on carvediolol 12.5 mg bid   3. MBD of CKD,  Not on binders. Phos 3.0  4. Anemia of CKD, with acute drop in hgb 6.9, continue epoetin alpha 3,000 unit 3 times/wk. S/p transfusion   5.  Vitamin D deficiency, on ergocalciferol 1000 po daily  ------------------------------------------------------  6. Type I DM, poorly controlled with frequent readmissions for DKA, on SSI, lantus 1 unit nightly   7. Restless leg syndrome, on ropinirole  8. Depression, on escitalopram and Abilify  9. Hypothyroidism, on levothyroxine   10. Gastroparesis, on metoclopramide,  For EGD and pyloric dilatation with Botox injection outpatient per GI. S/p PEG-J tube placement 4/19/22  11. Nutrition: TF Renal formula 40 cc/hour with H2O 30cc/hour Q 4 hours, low potassium diet     Plan:    · Continue HD three times/wk, TTS, tolerating HD well   · Continue to monitor H&H, transfuse for Hgb <7  · Continue epoetin alpha 3,000 units 3 times/wk  · Continue carvedilol to 12.5  mg bid  · Continue to monitor glucose levels  · Continue to monitor daily labs  · Continue to monitor phosphorus level  · Continue to monitor magnesium level  · Discharge planning, patient to return home.      Case discussed with Dr Austin Crow    Electronically signed by HEBER Young on 4/24/2022 at 10:45 AM  Patient seen and examined and agree with above as annotated  Discussed with KIM Cao MD FAC

## 2022-04-24 NOTE — PLAN OF CARE
Problem: Skin Integrity:  Goal: Will show no infection signs and symptoms  Description: Will show no infection signs and symptoms  Outcome: Progressing  Goal: Absence of new skin breakdown  Description: Absence of new skin breakdown  Outcome: Progressing     Problem: Breathing Pattern - Ineffective:  Goal: Ability to achieve and maintain a regular respiratory rate will improve  Description: Ability to achieve and maintain a regular respiratory rate will improve  Outcome: Progressing

## 2022-04-25 VITALS
SYSTOLIC BLOOD PRESSURE: 155 MMHG | WEIGHT: 129.85 LBS | DIASTOLIC BLOOD PRESSURE: 95 MMHG | OXYGEN SATURATION: 95 % | RESPIRATION RATE: 14 BRPM | BODY MASS INDEX: 22.17 KG/M2 | HEART RATE: 98 BPM | TEMPERATURE: 97.2 F | HEIGHT: 64 IN

## 2022-04-25 LAB
ANION GAP SERPL CALCULATED.3IONS-SCNC: 11 MMOL/L (ref 7–16)
ANISOCYTOSIS: ABNORMAL
BASOPHILIC STIPPLING: ABNORMAL
BASOPHILS ABSOLUTE: 0 E9/L (ref 0–0.2)
BASOPHILS RELATIVE PERCENT: 1.8 % (ref 0–2)
BUN BLDV-MCNC: 32 MG/DL (ref 6–20)
BURR CELLS: ABNORMAL
CALCIUM SERPL-MCNC: 9 MG/DL (ref 8.6–10.2)
CHLORIDE BLD-SCNC: 106 MMOL/L (ref 98–107)
CHP ED QC CHECK: ABNORMAL
CO2: 23 MMOL/L (ref 22–29)
CREAT SERPL-MCNC: 2.9 MG/DL (ref 0.5–1)
EOSINOPHILS ABSOLUTE: 0.14 E9/L (ref 0.05–0.5)
EOSINOPHILS RELATIVE PERCENT: 2.6 % (ref 0–6)
GFR AFRICAN AMERICAN: 23
GFR NON-AFRICAN AMERICAN: 23 ML/MIN/1.73
GLUCOSE BLD-MCNC: 175 MG/DL
GLUCOSE BLD-MCNC: 191 MG/DL (ref 74–99)
GLUCOSE BLD-MCNC: 313 MG/DL
GLUCOSE BLD-MCNC: 386 MG/DL
HCT VFR BLD CALC: 24.8 % (ref 34–48)
HEMOGLOBIN: 7.7 G/DL (ref 11.5–15.5)
HYPOCHROMIA: ABNORMAL
LYMPHOCYTES ABSOLUTE: 1.32 E9/L (ref 1.5–4)
LYMPHOCYTES RELATIVE PERCENT: 24.4 % (ref 20–42)
MAGNESIUM: 2.1 MG/DL (ref 1.6–2.6)
MCH RBC QN AUTO: 24.8 PG (ref 26–35)
MCHC RBC AUTO-ENTMCNC: 31 % (ref 32–34.5)
MCV RBC AUTO: 80 FL (ref 80–99.9)
METER GLUCOSE: 149 MG/DL (ref 74–99)
METER GLUCOSE: 192 MG/DL (ref 74–99)
METER GLUCOSE: 294 MG/DL (ref 74–99)
METER GLUCOSE: 319 MG/DL (ref 74–99)
METER GLUCOSE: 422 MG/DL (ref 74–99)
MONOCYTES ABSOLUTE: 0.22 E9/L (ref 0.1–0.95)
MONOCYTES RELATIVE PERCENT: 4.3 % (ref 2–12)
MYELOCYTE PERCENT: 0.9 % (ref 0–0)
NEUTROPHILS ABSOLUTE: 3.8 E9/L (ref 1.8–7.3)
NEUTROPHILS RELATIVE PERCENT: 67.8 % (ref 43–80)
OVALOCYTES: ABNORMAL
PDW BLD-RTO: 21.4 FL (ref 11.5–15)
PHOSPHORUS: 1.7 MG/DL (ref 2.5–4.5)
PLATELET # BLD: 276 E9/L (ref 130–450)
PMV BLD AUTO: 9.8 FL (ref 7–12)
POIKILOCYTES: ABNORMAL
POLYCHROMASIA: ABNORMAL
POTASSIUM SERPL-SCNC: 4.4 MMOL/L (ref 3.5–5)
RBC # BLD: 3.1 E12/L (ref 3.5–5.5)
SODIUM BLD-SCNC: 140 MMOL/L (ref 132–146)
TARGET CELLS: ABNORMAL
WBC # BLD: 5.5 E9/L (ref 4.5–11.5)

## 2022-04-25 PROCEDURE — 97530 THERAPEUTIC ACTIVITIES: CPT

## 2022-04-25 PROCEDURE — 6370000000 HC RX 637 (ALT 250 FOR IP): Performed by: STUDENT IN AN ORGANIZED HEALTH CARE EDUCATION/TRAINING PROGRAM

## 2022-04-25 PROCEDURE — 2580000003 HC RX 258: Performed by: STUDENT IN AN ORGANIZED HEALTH CARE EDUCATION/TRAINING PROGRAM

## 2022-04-25 PROCEDURE — 36415 COLL VENOUS BLD VENIPUNCTURE: CPT

## 2022-04-25 PROCEDURE — 6370000000 HC RX 637 (ALT 250 FOR IP): Performed by: INTERNAL MEDICINE

## 2022-04-25 PROCEDURE — 2500000003 HC RX 250 WO HCPCS: Performed by: STUDENT IN AN ORGANIZED HEALTH CARE EDUCATION/TRAINING PROGRAM

## 2022-04-25 PROCEDURE — 80048 BASIC METABOLIC PNL TOTAL CA: CPT

## 2022-04-25 PROCEDURE — S5553 INSULIN LONG ACTING 5 U: HCPCS | Performed by: INTERNAL MEDICINE

## 2022-04-25 PROCEDURE — 6360000002 HC RX W HCPCS: Performed by: NURSE PRACTITIONER

## 2022-04-25 PROCEDURE — 6360000002 HC RX W HCPCS: Performed by: INTERNAL MEDICINE

## 2022-04-25 PROCEDURE — 6360000002 HC RX W HCPCS: Performed by: STUDENT IN AN ORGANIZED HEALTH CARE EDUCATION/TRAINING PROGRAM

## 2022-04-25 PROCEDURE — 83735 ASSAY OF MAGNESIUM: CPT

## 2022-04-25 PROCEDURE — 85025 COMPLETE CBC W/AUTO DIFF WBC: CPT

## 2022-04-25 PROCEDURE — 84100 ASSAY OF PHOSPHORUS: CPT

## 2022-04-25 PROCEDURE — 6370000000 HC RX 637 (ALT 250 FOR IP): Performed by: NURSE PRACTITIONER

## 2022-04-25 PROCEDURE — 99239 HOSP IP/OBS DSCHRG MGMT >30: CPT | Performed by: INTERNAL MEDICINE

## 2022-04-25 PROCEDURE — 82962 GLUCOSE BLOOD TEST: CPT

## 2022-04-25 RX ORDER — CARVEDILOL 12.5 MG/1
12.5 TABLET ORAL 2 TIMES DAILY
Qty: 60 TABLET | Refills: 3 | Status: SHIPPED | OUTPATIENT
Start: 2022-04-25

## 2022-04-25 RX ORDER — LACTOSE-REDUCED FOOD
240 LIQUID (ML) ORAL EVERY 6 HOURS
Qty: 10000 ML | Refills: 0 | Status: SHIPPED | OUTPATIENT
Start: 2022-04-25 | End: 2022-04-25 | Stop reason: SDUPTHER

## 2022-04-25 RX ORDER — THIAMINE HYDROCHLORIDE 100 MG/ML
100 INJECTION, SOLUTION INTRAMUSCULAR; INTRAVENOUS DAILY
Qty: 1 EACH | Refills: 0 | Status: CANCELLED | OUTPATIENT
Start: 2022-04-25

## 2022-04-25 RX ORDER — POLYETHYLENE GLYCOL 3350 17 G/17G
17 POWDER, FOR SOLUTION ORAL DAILY PRN
Qty: 30 EACH | Refills: 0 | Status: SHIPPED | OUTPATIENT
Start: 2022-04-25 | End: 2022-05-25

## 2022-04-25 RX ORDER — LACTOSE-REDUCED FOOD
240 LIQUID (ML) ORAL EVERY 6 HOURS
Qty: 10000 ML | Refills: 3 | Status: SHIPPED | OUTPATIENT
Start: 2022-04-25

## 2022-04-25 RX ORDER — INSULIN GLARGINE 100 [IU]/ML
1 INJECTION, SOLUTION SUBCUTANEOUS 2 TIMES DAILY
Qty: 1 PEN | Refills: 2 | Status: SHIPPED | OUTPATIENT
Start: 2022-04-25

## 2022-04-25 RX ORDER — NUT.TX.IMPAIRED RENAL FXN,SOY 0.09G-2/ML
240 LIQUID (ML) ORAL EVERY 6 HOURS
Qty: 10000 ML | Refills: 0 | Status: CANCELLED | OUTPATIENT
Start: 2022-04-25

## 2022-04-25 RX ORDER — LIDOCAINE 50 MG/G
OINTMENT TOPICAL
Qty: 10 G | Refills: 0 | Status: SHIPPED | OUTPATIENT
Start: 2022-04-25

## 2022-04-25 RX ORDER — INSULIN GLARGINE 100 [IU]/ML
1 INJECTION, SOLUTION SUBCUTANEOUS 2 TIMES DAILY
Qty: 1 PEN | Refills: 0 | Status: CANCELLED | OUTPATIENT
Start: 2022-04-25

## 2022-04-25 RX ORDER — LANSOPRAZOLE
30 KIT
Qty: 300 ML | Refills: 1 | Status: SHIPPED | OUTPATIENT
Start: 2022-04-25

## 2022-04-25 RX ADMIN — SODIUM CHLORIDE, PRESERVATIVE FREE 100 UNITS: 5 INJECTION INTRAVENOUS at 12:03

## 2022-04-25 RX ADMIN — Medication 2 UNITS: at 06:57

## 2022-04-25 RX ADMIN — THIAMINE HYDROCHLORIDE 100 MG: 100 INJECTION, SOLUTION INTRAMUSCULAR; INTRAVENOUS at 12:04

## 2022-04-25 RX ADMIN — INSULIN GLARGINE-YFGN 1 UNITS: 100 INJECTION, SOLUTION SUBCUTANEOUS at 08:35

## 2022-04-25 RX ADMIN — ACETAMINOPHEN 1000 MG: 500 TABLET ORAL at 15:04

## 2022-04-25 RX ADMIN — Medication 2 UNITS: at 01:35

## 2022-04-25 RX ADMIN — EPOETIN ALFA-EPBX 3000 UNITS: 3000 INJECTION, SOLUTION INTRAVENOUS; SUBCUTANEOUS at 08:36

## 2022-04-25 RX ADMIN — ACETAMINOPHEN 1000 MG: 500 TABLET ORAL at 06:35

## 2022-04-25 RX ADMIN — DIPHENHYDRAMINE HYDROCHLORIDE 25 MG: 50 INJECTION, SOLUTION INTRAMUSCULAR; INTRAVENOUS at 15:47

## 2022-04-25 RX ADMIN — ESCITALOPRAM 10 MG: 10 TABLET, FILM COATED ORAL at 08:36

## 2022-04-25 RX ADMIN — Medication 2 UNITS: at 13:10

## 2022-04-25 RX ADMIN — LANSOPRAZOLE 30 MG: KIT at 06:36

## 2022-04-25 RX ADMIN — LANSOPRAZOLE 30 MG: KIT at 15:05

## 2022-04-25 RX ADMIN — LEVOTHYROXINE SODIUM 88 MCG: 0.09 TABLET ORAL at 06:36

## 2022-04-25 RX ADMIN — Medication 1000 UNITS: at 08:36

## 2022-04-25 RX ADMIN — DIPHENHYDRAMINE HYDROCHLORIDE 25 MG: 50 INJECTION, SOLUTION INTRAMUSCULAR; INTRAVENOUS at 10:03

## 2022-04-25 RX ADMIN — DIPHENHYDRAMINE HYDROCHLORIDE 25 MG: 50 INJECTION, SOLUTION INTRAMUSCULAR; INTRAVENOUS at 02:54

## 2022-04-25 RX ADMIN — CARVEDILOL 12.5 MG: 6.25 TABLET, FILM COATED ORAL at 08:36

## 2022-04-25 RX ADMIN — Medication 10 ML: at 12:04

## 2022-04-25 RX ADMIN — SODIUM PHOSPHATE, MONOBASIC, MONOHYDRATE 15 MMOL: 276; 142 INJECTION, SOLUTION INTRAVENOUS at 10:09

## 2022-04-25 ASSESSMENT — PAIN DESCRIPTION - DESCRIPTORS
DESCRIPTORS: DISCOMFORT;ACHING
DESCRIPTORS: ACHING;DISCOMFORT

## 2022-04-25 ASSESSMENT — PAIN DESCRIPTION - ORIENTATION
ORIENTATION: MID
ORIENTATION: MID

## 2022-04-25 ASSESSMENT — PAIN SCALES - GENERAL
PAINLEVEL_OUTOF10: 7
PAINLEVEL_OUTOF10: 3

## 2022-04-25 ASSESSMENT — PAIN DESCRIPTION - LOCATION: LOCATION: ABDOMEN

## 2022-04-25 NOTE — PROGRESS NOTES
Department of Internal Medicine  Nephrology Progress Note      Events reviewed. SUBJECTIVE:  We are following Miss Sweta Vuong for ESRD on HD. Patient reports her abdomen is sore, denies any nausea or vomiting. PHYSICAL EXAM:      Vitals:    VITALS:  BP (!) 155/95   Pulse 98   Temp 97.2 °F (36.2 °C) (Temporal)   Resp 14   Ht 5' 4\" (1.626 m)   Wt 129 lb 13.6 oz (58.9 kg)   SpO2 95%   BMI 22.29 kg/m²   24HR INTAKE/OUTPUT:      Intake/Output Summary (Last 24 hours) at 4/25/2022 1309  Last data filed at 4/25/2022 1232  Gross per 24 hour   Intake 530 ml   Output --   Net 530 ml       Access: RIJ tunneled dialysis catheter  Constitutional: Awake, alert, NAD Flat affect  HEENT:  PERRL, normocephalic, atraumatic  Respiratory:  CTA bilateral  Cardiovascular/Edema:  S1/S2 RRR, no murmur gallop or rub  Gastrointestinal:  soft, tenderness, PEG-J tube noted.    Neurologic: A&OX3 follows commands, no focal deficit  Skin:  Warm, dry, ecchymotic areas to abdomen  Other: no edema    Scheduled Meds:   sodium phosphate IVPB  15 mmol IntraVENous Once    insulin regular  0-4 Units SubCUTAneous Q6H    acetaminophen  1,000 mg Oral 3 times per day    insulin glargine  1 Units SubCUTAneous BID    lansoprazole  30 mg Per G Tube BID AC    thiamine  100 mg IntraVENous Daily    sodium chloride flush  5-40 mL IntraVENous 2 times per day    labetalol  10 mg IntraVENous Once    carvedilol  12.5 mg Oral BID    melatonin  6 mg Oral Nightly    heparin flush  1 mL IntraVENous 2 times per day    escitalopram  10 mg Oral Daily    ARIPiprazole  5 mg Oral Nightly    rOPINIRole  0.25 mg Oral Nightly    levothyroxine  88 mcg Oral Daily    epoetin nena-epbx  3,000 Units SubCUTAneous Once per day on Mon Wed Fri    Vitamin D  1,000 Units Per NG tube Daily     Continuous Infusions:   sodium chloride      sodium chloride      dextrose 100 mL/hr (04/20/22 2101)     PRN Meds:.sodium chloride, lidocaine, promethazine, sodium chloride flush, sodium chloride, benzocaine, [Held by provider] labetalol, glucose, diphenhydrAMINE, magic (miracle) mouthwash, trimethobenzamide, loperamide, heparin flush, white petrolatum, polyethylene glycol, dextrose, glucagon (rDNA), dextrose, albuterol    DATA:    CBC:   Lab Results   Component Value Date    WBC 5.5 04/25/2022    RBC 3.10 04/25/2022    HGB 7.7 04/25/2022    HCT 24.8 04/25/2022    MCV 80.0 04/25/2022    MCH 24.8 04/25/2022    MCHC 31.0 04/25/2022    RDW 21.4 04/25/2022     04/25/2022    MPV 9.8 04/25/2022     CMP:    Lab Results   Component Value Date     04/25/2022    K 4.4 04/25/2022    K 4.0 04/14/2022     04/25/2022    CO2 23 04/25/2022    BUN 32 04/25/2022    CREATININE 2.9 04/25/2022    GFRAA 23 04/25/2022    LABGLOM 23 04/25/2022    GLUCOSE 313 04/25/2022    GLUCOSE 130 05/18/2012    PROT 7.0 04/19/2022    LABALBU 3.6 04/19/2022    LABALBU 4.1 05/18/2012    CALCIUM 9.0 04/25/2022    BILITOT 0.4 04/19/2022    ALKPHOS 157 04/19/2022    AST 11 04/19/2022    ALT 5 04/19/2022     Magnesium:    Lab Results   Component Value Date    MG 2.1 04/25/2022     Phosphorus:    Lab Results   Component Value Date    PHOS 1.7 04/25/2022     Radiology Review:      CT Head WO Contrast March 27, 2022   No acute intracranial abnormality or hemorrhage. CT Abdomen Pelvis WO Contrast March 27, 2022   1. Moderate ascites throughout abdomen and pelvis. 2.  Multiple thick walled loops of small bowel throughout the abdomen could   suggest nonspecific infectious or inflammatory enteritis. 3.  Generalized body wall edema. 4.  Small bilateral pleural effusions with adjacent atelectatic changes. Chest X-Ray March 28, 2022   Infiltrate and or atelectasis at the left lower lobe. Substantially resolved   infiltrate and or atelectasis on the right. New NG tube.            BRIEF SUMMARY OF INITIAL CONSULT:    Briefly, Miss Humaira Perez is a 36year-old female with a PMH of ESRD on hemodialysis 3 days/wk, on TTS schedule at [facility], Type I DM, poorly controlled with recurrent admissions for DKA, HTN, gastroparesis, ascites, infective endocarditis, cardiac arrest, hypothyroidism, recent Covid, and depression who was admitted on March 27, 2022 after she was found unresponsive at the SNF where she resides. Patient was found to be profoundly hypoglycemic and EMS was called. She was intubated in ER for airway protection as she remained unresponsive. CT head showed no acute intracranial abnormality or hemorrhage, chest x-ray demonstrates right perihilar opacity concerning for aspiration pneumonia. She was ultimately admitted to MICU for further evaluation. Labs on admission were significant for sodium 135, potassium 5.0, chloride 100, bicarbonate 25, BUN 38, creatinine 4.9, proBNP >70,000. We are consulted for dialysis management. Problems resolved. · Hypoglycemia, was on D10, now hyperglycemic with +ketones (beta-hydroxybutyrate >4.5)  · Acute respiratory failure, 2/2 aspiration pneumonia? On 40% Fio2. Extubated 6/84  · Acute metabolic encephalopathy 2/2 hypoglycemia. Resolving   · Acidemia, pH 7.311, PCO2 35.0, HCO3 68.7, metabolic acidosis from DKA  · Hx recurrent C. difficile infection, on flagyl po Q 8hrs   · Hx of MDRO UTI  · Aspiration pneumonia? S/p piperacillin  · Hypoglycemia, to start D10 with 150 mEq of sodium chloride at 60 cc/hour    · Hypomagnesemia 2/2 poor oral intake, to replace  · Hypophosphatemia 2/2 poor oral intake, improving, phos 3.0    IMPRESSION/RECOMMENDATIONS:      1. ESKD 3 times a week TTS via RIJ tunneled dialysis catheter. HD 3 times a week TTS. 2. HTN, with orthostatic hypotension, on carvediolol 12.5 mg bid   3. MBD of CKD,  Not on binders. Phos 3.0  4. Anemia of CKD, on epoetin alpha 3,000 unit 3 times/wk. S/p transfusion   5. Vitamin D deficiency, on ergocalciferol 1000 po daily  ------------------------------------------------------  6.  Type I DM, poorly controlled with frequent readmissions for DKA, on SSI, lantus 1 unit nightly   7. Restless leg syndrome, on ropinirole  8. Depression, on escitalopram and Abilify  9. Hypothyroidism, on levothyroxine   10. Gastroparesis, on metoclopramide, S/p PEG-J tube placement 4/19/22  11.  Nutrition: TF Renal formula 40 cc/hour with H2O 30cc/hour Q 4 hours, low potassium diet     Plan:    · Continue HD three times/wk, TTS  · Continue epoetin alpha 3,000 units 3 times/wk  · Continue carvedilol to 12.5  mg bid  · Start Neutra-Phos 250 mg twice daily x4 days  · Continue to monitor glucose levels  · Continue to monitor daily labs  · Continue to monitor phosphorus level  · Continue to monitor magnesium level  · Discharge planning      Electronically signed by Daria Ruiz MD on 4/25/2022 at 1:09 PM

## 2022-04-25 NOTE — PROGRESS NOTES
Department of Internal Medicine  Infectious Diseases  Progress Note      C/C :   Endocarditis     Pt is awake and alert   Denies fever or chills  Reports leg pain   Afebrile     Current Facility-Administered Medications   Medication Dose Route Frequency Provider Last Rate Last Dose    vancomycin 1000 mg IVPB in 250 mL D5W addavial  1,000 mg Intravenous Once Ishan Dee MD        insulin glargine (LANTUS) injection vial 7 Units  7 Units Subcutaneous Nightly Josh Alexander MD   7 Units at 11/10/20 2101    insulin lispro (HUMALOG) injection vial 0-4 Units  0-4 Units Subcutaneous 4x Daily AC & HS Josh Alexander MD   1 Units at 11/11/20 1100    megestrol (MEGACE) 40 MG/ML suspension 200 mg  200 mg Oral Daily Joey Grossman MD   200 mg at 11/11/20 0756    aluminum hydroxide (ALTERNGEL) suspension 320 mg  320 mg Oral TID WC Joey Grossman MD   320 mg at 11/11/20 1112    [Held by provider] dextrose 5 % and 0.45 % sodium chloride infusion   Intravenous Continuous Zaria Cm  mL/hr at 11/08/20 0825      bisacodyl (DULCOLAX) suppository 10 mg  10 mg Rectal Daily PRN HEBER Hein CNP        heparin (porcine) injection 5,000 Units  5,000 Units Subcutaneous 3 times per day HEBER Hein CNP   5,000 Units at 11/11/20 1455    lidocaine 4 % external patch 1 patch  1 patch Transdermal Daily HEBER Hein CNP   Stopped at 11/10/20 0915    hydrALAZINE (APRESOLINE) injection 10 mg  10 mg Intravenous Q4H PRN HEBER Hein CNP   10 mg at 11/07/20 0530    labetalol (NORMODYNE;TRANDATE) injection 10 mg  10 mg Intravenous Q4H PRN HEBER Hein CNP   10 mg at 11/07/20 0336    senna (SENOKOT) tablet 8.6 mg  1 tablet Oral Nightly HEBER Hein CNP   8.6 mg at 11/10/20 2101    levothyroxine (SYNTHROID) tablet 75 mcg  75 mcg Oral QAM AC HEBER Hein CNP   75 mcg at 11/11/20 0641    dilTIAZem (CARDIZEM CD) extended release capsule 240 mg  240 mg Oral Daily Fara Dad, APRN - CNP   240 mg at 11/11/20 0755    bumetanide (BUMEX) tablet 2 mg  2 mg Oral BID Fara Dad, APRN - CNP   2 mg at 11/11/20 0755    metOLazone (ZAROXOLYN) tablet 5 mg  5 mg Oral Daily Fara Dad, APRN - CNP   5 mg at 11/11/20 0756    oxyCODONE-acetaminophen (PERCOCET) 5-325 MG per tablet 0.5 tablet  0.5 tablet Oral Q6H PRN Fara Dad, APRN - CNP        Or    oxyCODONE-acetaminophen (PERCOCET) 5-325 MG per tablet 1 tablet  1 tablet Oral Q6H PRN Fara Dad, APRN - CNP   1 tablet at 11/11/20 1331    hydrALAZINE (APRESOLINE) tablet 25 mg  25 mg Oral 3 times per day Fara Dad, APRN - CNP   25 mg at 11/11/20 1455    sodium chloride flush 0.9 % injection 10 mL  10 mL Intravenous PRN Fara Dad, APRN - CNP   10 mL at 11/07/20 1206    heparin flush 100 UNIT/ML injection 100 Units  1 mL Intravenous 2 times per day Fara Dad, APRN - CNP   100 Units at 11/11/20 0759    heparin flush 100 UNIT/ML injection 100 Units  1 mL Intracatheter PRN Fara Dad, APRN - CNP   100 Units at 11/10/20 1514    vancomycin (VANCOCIN) oral solution 250 mg  250 mg Oral 4 times per day Fara Dad, APRN - CNP   250 mg at 11/11/20 1112    calcium gluconate 10 % injection 1 g  1 g Intravenous Once Fara Dad, APRN - CNP        sodium chloride flush 0.9 % injection 10 mL  10 mL Intravenous 2 times per day Fara Dad, APRN - CNP   10 mL at 11/11/20 0759    sodium chloride flush 0.9 % injection 10 mL  10 mL Intravenous PRN Fara Dad, APRN - CNP   10 mL at 11/05/20 0029    acetaminophen (TYLENOL) tablet 650 mg  650 mg Oral Q6H PRN Fara Dad, APRN - CNP   650 mg at 11/04/20 2331    Or    acetaminophen (TYLENOL) suppository 650 mg  650 mg Rectal Q6H PRN Fara Dad, APRN - CNP        polyethylene glycol (GLYCOLAX) packet 17 g  17 g Oral Daily PRN Fara Dad, APRN - CNP        promethazine (PHENERGAN) tablet 12.5 mg  12.5 mg Oral Q6H PRN Fara Vianca, APRN moist, no oral thrush   Neck:   Supple, no lymphadenopathy   Lungs:     Clear to auscultation bilaterally,    Heart:    Tachycardic, systolic murmur +   Abdomen:     Soft, non-tender, bowel sounds present, PD catheter in place    Extremities:   No edema, no cyanosis   Pulses:   Dorsalis pedis palpable    Skin:   no rashes or lesions     CBC with Differential:      Lab Results   Component Value Date    WBC 6.0 11/11/2020    RBC 3.27 11/11/2020    HGB 9.7 11/11/2020    HCT 30.8 11/11/2020     11/11/2020    MCV 94.2 11/11/2020    MCH 29.7 11/11/2020    MCHC 31.5 11/11/2020    RDW 15.9 11/11/2020    NRBC 0.9 08/10/2020    SEGSPCT 67 06/27/2013    METASPCT 1.7 08/10/2020    LYMPHOPCT 36.8 11/11/2020    MONOPCT 7.7 11/11/2020    BASOPCT 1.7 11/11/2020    MONOSABS 0.46 11/11/2020    LYMPHSABS 2.20 11/11/2020    EOSABS 0.45 11/11/2020    BASOSABS 0.10 11/11/2020       CMP     Lab Results   Component Value Date     11/11/2020    K 4.0 11/11/2020    K 3.7 10/11/2020     11/11/2020    CO2 24 11/11/2020    BUN 46 11/11/2020    CREATININE 7.5 11/11/2020    GFRAA 8 11/11/2020    LABGLOM 8 11/11/2020    GLUCOSE 131 11/11/2020    GLUCOSE 130 05/18/2012    PROT 5.2 11/11/2020    LABALBU 3.5 11/11/2020    LABALBU 4.1 05/18/2012    CALCIUM 9.1 11/11/2020    BILITOT <0.2 11/11/2020    ALKPHOS 107 11/11/2020    AST 12 11/11/2020    ALT <5 11/11/2020         Hepatic Function Panel:    Lab Results   Component Value Date    ALKPHOS 107 11/11/2020    ALT <5 11/11/2020    AST 12 11/11/2020    PROT 5.2 11/11/2020    BILITOT <0.2 11/11/2020    BILIDIR <0.2 10/11/2020    IBILI see below 10/11/2020    LABALBU 3.5 11/11/2020    LABALBU 4.1 05/18/2012       PT/INR:    Lab Results   Component Value Date    PROTIME 10.6 10/31/2020    PROTIME 13.6 08/14/2011    INR 1.0 10/31/2020       TSH:    Lab Results   Component Value Date    TSH 6.000 10/31/2020       U/A:    Lab Results   Component Value Date    COLORU Yellow 11/01/2020 PHUR 8.0 11/01/2020    LABCAST RARE 01/12/2020    WBCUA NONE 11/01/2020    WBCUA 0-1 05/18/2012    RBCUA 2-5 11/01/2020    RBCUA 1-3 08/01/2013    MUCUS Present 02/12/2016    TRICHOMONAS Present 07/17/2020    YEAST RARE 05/24/2018    BACTERIA FEW 11/01/2020    CLARITYU Clear 11/01/2020    SPECGRAV 1.020 11/01/2020    LEUKOCYTESUR Negative 11/01/2020    UROBILINOGEN 0.2 11/01/2020    BILIRUBINUR Negative 11/01/2020    BILIRUBINUR SMALL 05/18/2012    BLOODU SMALL 11/01/2020    GLUCOSEU 250 11/01/2020    GLUCOSEU >=1000 05/18/2012    AMORPHOUS RARE 11/01/2019       ABG:    Lab Results   Component Value Date    UYC5UAS 22.1 10/29/2019    D5SQKIER 97.7 09/08/2012    PHART 7.345 07/20/2012    NJI7DYN 35.0 10/29/2019    PO2ART 91.5 10/29/2019       MICROBIOLOGY:    Blood culture - Staph epidermidis  ( 10/8)     Vanco random level  (11/4)  20.3     Cx ( 11/6) neg         YULISSA -     Summary    Ejection fraction is visually estimated at 60-65%.    There is a right atrial mobile mass approximately 2.4 cm x 0.4 cm, which    may represent a vegetation or mobile thrombus.    It attaches to the atrial septum superior to the fossa ovalis, at the    junction of the SVC and the RA.    Previously seen central venous catheter and associated mass are no longer    seen on this study.    Mild mitral regurgitation.    Mild tricuspid regurgitation.    No evidence of valvular vegetations       IMPRESSION:     1. Staph epidermidis bacteremia - endocarditis -s/p removal of the mediport   YULISSA - right atrial mass ? Clot vs vegetation ( 2.4 x0.4 cm ) s/p removal ( 11/6)- cx neg  -  Pathology - Fragments of organizing thrombus     2. C diff infection ( at [facility] [place] on 10/25) - diarrhea stopped     RECOMMENDATIONS:      1. Vancomycin 1 gram today and in one week       Remove midline    2.   OK to discharge from ID POV # Hemorrhagic shock  - s/p MICU admission  - s/p 4U pRBC and 1U plt, 1U plasma, on protonix bid, shock resolved  - prior hx of severe esophagitis and gastric ulcers noted on endoscopy 2014  - no plan for endoscopic evaluation at this time  - c/w bowel regimen to prevent constipation/straining  - family declined further A/C at this time

## 2022-04-25 NOTE — PROGRESS NOTES
[facility] [address]  Internal Medicine Residency Program  Progress Note - House Team     Patient:  Av Torres 36 y.o. female MRN: [id number]     Date of Service: 4/25/2022     CC: AMS, hypoglycemia  Overnight events: BS stable overnight     Subjective     Patient was seen and examined this morning at bedside in no acute distress. No acute events overnight. Patient tolerating bolus feeds without any nausea, vomiting, bloating. Flat affect. Objective     Physical Exam:  · Vitals: BP (!) 155/95   Pulse 98   Temp 97.2 °F (36.2 °C) (Temporal)   Resp 14   Ht 5' 4\" (1.626 m)   Wt 129 lb 13.6 oz (58.9 kg)   SpO2 95%   BMI 22.29 kg/m²     · I & O - 24hr: No intake/output data recorded. · General Appearance: alert, appears stated age and cooperative. Flat affect. · HEENT:  Head: Normocephalic, no lesions, without obvious abnormality. · Neck: no adenopathy, no carotid bruit, no JVD, supple, symmetrical, trachea midline and thyroid not enlarged, symmetric, no tenderness/mass/nodules  · Lung: clear to auscultation bilaterally  · Heart: regular rate and rhythm, S1, S2 normal, no murmur, click, rub or gallop  · Abdomen: soft, non-tender; bowel sounds normal; no masses,  no organomegaly. PEG-J site c/d/i  · Extremities:  extremities normal, atraumatic, no cyanosis or edema  · Musculokeletal: No joint swelling, no muscle tenderness. ROM normal in all joints of extremities.    · Neurologic: Mental status: Alert, oriented, thought content appropriate  Subject  Pertinent Labs & Imaging Studies   jorge alberto  CBC:   Lab Results   Component Value Date    WBC 5.5 04/25/2022    RBC 3.10 04/25/2022    HGB 7.7 04/25/2022    HCT 24.8 04/25/2022    MCV 80.0 04/25/2022    MCH 24.8 04/25/2022    MCHC 31.0 04/25/2022    RDW 21.4 04/25/2022     04/25/2022    MPV 9.8 04/25/2022     BMP:    Lab Results   Component Value Date     04/25/2022    K 4.4 04/25/2022    K 4.0 04/14/2022     04/25/2022    CO2 23 04/25/2022    BUN 32 04/25/2022    LABALBU 3.6 04/19/2022    LABALBU 4.1 05/18/2012    CREATININE 2.9 04/25/2022    CALCIUM 9.0 04/25/2022    GFRAA 23 04/25/2022    LABGLOM 23 04/25/2022    GLUCOSE 191 04/25/2022    GLUCOSE 130 05/18/2012       XR CHEST PORTABLE   Final Result   Borderline cardiac size with vascular congestion. XR ABDOMEN (KUB) (SINGLE AP VIEW)   Final Result   No evidence of bowel obstruction. XR CHEST PORTABLE   Final Result   1. The lungs are clear. There is no infiltrate or effusion. 2. Stable position of the support lines and tubes. XR CHEST PORTABLE   Final Result   1. There is no acute cardiopulmonary disease   2. Stable position of the support lines and tubes. XR CHEST PORTABLE   Final Result   Infiltrate and or atelectasis at the left lower lobe. Substantially resolved   infiltrate and or atelectasis on the right. New NG tube. CT HEAD WO CONTRAST   Final Result   No acute intracranial abnormality or hemorrhage. CT ABDOMEN PELVIS WO CONTRAST Additional Contrast? None   Final Result   1. Moderate ascites throughout abdomen and pelvis. 2.  Multiple thick walled loops of small bowel throughout the abdomen could   suggest nonspecific infectious or inflammatory enteritis. 3.  Generalized body wall edema. 4.  Small bilateral pleural effusions with adjacent atelectatic changes. XR ABDOMEN FOR NG/OG/NE TUBE PLACEMENT   Final Result   Enteric tube tip in the body of the stomach. XR CHEST PORTABLE   Final Result   Right perihilar opacity. Resident's Assessment and Plan     Assessment:     1. Hypersensitive IDDM1-A1c 7.7% this admission  2. Nausea/Vomiting w/ Abdominal pain with Hx of diabetic gastroparesis s/p PEG-J 4/21  3. HTN, on coreg BID  4. ESRD on HD TTS  5. Hypothyroidism-TSH 5.37, FT4 1.10 normal  6. Acute on chronic anemia-s/p 4 unit PRBCs, chronic component likely due to ESRD  7.  History of MDRO UTI  8. History of C. difficile infection  9. Oral Candidiasis  10. DKA - resolved  11. Acute hypoxic respiratory failure - resolved  12. Acute metabolic encephalopathy-likely due to hypoglycemia in setting of DM1 and ESRD (CTH negative, negative UDS/SDS, NH3 normal, B12 normal 1 month ago) - resolved  13. Aspiration pneumonia - resolved     Plan:  · Continue lansoprazole BID, phenergan 25 mg TID PRN to aid with abdominal pain/dyspepsia  · Per Endocrine, 1U lantus BID with regular insulin modified SSI (0-4 for 80>180) q6h              - POCT q2h              - Bolus feeds 240 q6h              - Continue regular diet as tolerated              - Monitor daily BMP, Mg, Phos, replace electrolytes as tolerated  · Continue home synthroid 88 mcg daily, abilify 5 mg nightly, and lexapro 10 mg daily, ropinirole 0.25 nightly  · Continue Coreg 12.5 mg BID  · Continue oral mouth wash and orajel  · Pain control with tylenol scheduled and lidocaine PRN for PEG-J site. · HD TThS per nephro, continue retacrit  · DME order placed for wheelchair for home going. Patient has been hospitalized for more than 20 days and has had deconditioning due to her condition. Due to increased weakness, patient will require a wheelchair for transport and mobility at home.     PT/OT evaluation: not indicated  DVT prophylaxis/ GI prophylaxis: SCD/lansoprazole  Disposition: continue current care     [place] MD, PGY-1  Attending physician: Dr. Aníbal Starks  Attending Physician Statement:  Kyra Sawant M.D., F.A.C.P.     I have discussed the case, including pertinent history and exam findings with the resident/NP. I have seen and examined the patient and the key elements of the encounter have been performed by me.  I agree with the resident ROS, PMHx, PSHx, meds reviewed and assessment, plan and orders as documented by the resident/NP [facility] [place] [facility] charts reviewed, including other providers notes, relevant labs and imaging.    >50% of time spent coordinating care with other providers and/or counseling patient/family  Remainder of medical problems as per resident note.  [facility] Course:   The pt is a 37 y/o female with a PMHx of brittle T1DM, ESRD on HD, HTN, recent C. Diff infection, and endocarditis w/ consequent septic emboli formation, who presented to the ED from her long-term care facility and was hospitalized on 3/28/2022. Her presenting complaint was acute encephalopathy. Per note review, the pt's LKW was 1100 on 3/28/2022. She was found lethartic and altered at approx. 1700 by NH staff, and subsequent POCT BG was too low to be read by the glucometer. EMS was called, and the pt was transported emergently to [facility]. On arrival, she was intubated for airway protection, and was transferred to the MICU. On arrival, she was tachycardic, hypertensive, afebrile, O2 saturation 100% on the ventilator. Pro-BNP and troponin were elevated, and labs were also notable for anemia to 6.5 (baseline 6.0-8.0). UA showing pyuria and hematuria, moderate bacteria, and large LE. CXR showing possible infiltrate/edema, and CT abdomen/pelvis concerning for possible enteritis. CT head (-)ve.      Once in the MICU, the pt was started on empiric antibiotic treatment for possible aspiration pneumonia/pneumonitis, as well as suspected enteritis. Hemodialysis was restarted, and Endocrinology was consulted for aid with managing extremely labile blood sugars. Infectious cultures were negative, and the patient's antimicrobial regimen was de-escalated to Flagyl q8hrs (end date 4/4/2022). Her blood sugars were controlled w/ 1U Lantus nightly and a modified sliding scale. She was successfully extubated, and has been tolerating room air well since that time.      On the floors patient has had difficulty in controlling blood glucose levels. Patient was on 1U Lantus w/ a modified sliding scale but still patient would have episodes of hypo and hyperglycemia.  Patient complained of abdominal pain and nausea and would consistently not eat due to these symptoms. Patient was on Bentyl, Reglan, Zofran, Pepcid, Phenergan all with minimal relief. Patient was not eating much and on 4/4 and 4/5 patient's 1u Lantus was held despite being scheduled for nightly Lantus. BHB on 4/6 showed >4.50. Patient was treated on the floors with subq insulin and fluid. AG closed x2 and patient began feeling better. Patient still with episodes of hyperglycemia in 350s as well as hypoglycemia episodes. GI consulted with recs of starting patient on PPI BID as well as considering PEG-J tube at this time. Currently plans for patient to undergo PEG-J    ACD - getting epo  Hormonal workup otherwise OK  Check zinc, cu etc.    Add thiamine IV  Refeeding syndrome-- repleting Mag/phos, K+  bc still getting daily IV mag and phos  Will now plan scheduled PO mag  And Phos, dec lab draws soon       NO hypoxia at night now, use of cpap since RRT  Re eval, stable, defer O2 at home  Long standing dm1-- autonomic neuroapthy +gastroparesis  Very brittle dm - even low doses insulin can drop BS-- when NOT eating   ?1 unis (vs 2 too high) +low intensity SS    +lantus once daily  NOW Attempting continuous monitoring- at bedside 289 this am off that unit   I agree to stop schduled 1-2 units, only give SS with each bolus  +lantus low dose 1 unit QD or BID        Sp peg J now  --  - weaned off IV +50cc free water Q6  Tolerating tube feeds  QID bolus feeding      --we chose to continue bolus feeding rather than continuous feeding-- for easier lifestyle/mobilitiy -- leave house etc..     +oral eating now, better  Discussed wean off promethazine     Go home with mom-- mom wants to managed tube feeding at home- declining home health? ?  Plan bolus tube feeding for home  Giving only SS insulin with bolus tube feed  DC home today  NO hypglycemic on current schedule  BS less low/no hypoglycemic events,  BS acceptable for now  Severe debilitiation, generalized weakness, WC home use-- PT/OT/nursing  DC time >30min

## 2022-04-25 NOTE — PROGRESS NOTES
Patient has implanted blood glucose monitoring device. She has requested to use this for blood sugar checks, she does not want her fingers stuck anymore (q.2 hour checks). Glucose at this time was 175. RN notified.

## 2022-04-25 NOTE — PROGRESS NOTES
Pt discharged home with her mom who is transporting her home. Educated pts mom and pt regarding bolus tube feedings. Mom asked appropriate questions and states understanding. Pt had all belongings with her upon discharge.

## 2022-04-25 NOTE — PROGRESS NOTES
OCCUPATIONAL THERAPY TREATMENT NOTE    [id number] [place] [address]  [facility], [facility]  Patient Name: Sharri Peguero  MRN: [id number]  : 1990  Room: [address] [place] [address]       Evaluating OT: Francesca Ellsworth, ELOINA,  OTR/L; [id number]     Referring Provider: Miri Mccauley MD   Specific Provider Orders/Date: OT eval and treat (3/30/22)        Diagnosis: Altered mental status [L74.03]  Acute metabolic encephalopathy [G74.09]      Reason for admission: Pt admitted with AMS, hypoglycemia from SNF.     Surgery/Procedures: None this admission.      Pertinent Medical History:    Past Medical History        Past Medical History:   Diagnosis Date    Acute congestive heart failure ([address]      BC (acute kidney injury) ([address] 10/01/2019    Cardiac arrest ([address] 02/15/2021    Cephalgia 10/09/2019    Chronic kidney disease      Depression      Diabetes mellitus ([address]      Diabetic gastroparesis associated with type 1 diabetes mellitus ([address] 2018    Diabetic ketoacidosis ([address] 2011    Diabetic ketoacidosis with coma associated with type 1 diabetes mellitus ([address] 2013    Diabetic polyneuropathy associated with type 1 diabetes mellitus ([address] 2020    Drug use complicating pregnancy in third trimester      Endocarditis 10/31/2020    ESRD (end stage renal disease) ([address] 2020    H/O cardiovascular stress test 2021     Lexiscan    Hemodialysis patient [facility])      History of blood transfusion 2019    Hyperlipidemia 10/08/2020    Hyperosmolar hyperglycemic state (HHS) ([address] 2020    Hypothyroidism 10/08/2020    Iron deficiency anemia 10/01/2019    MDRO (multiple drug resistant organisms) resistance      MRSA (methicillin resistant Staphylococcus aureus)       back wound abcess    Non compliance w medication regimen 2016    Other disorders of kidney and ureter      Pregnancy 2016     16 weeks  Previous  delivery affecting pregnancy, antepartum 2017    Previous stillbirth or demise, antepartum 2016    Seizure ([facility] 2020    Severe pre-eclampsia in third trimester 2016    Shock liver 02/15/2021    Valvular endocarditis 11/10/2020     This Diagnosis was added to the Problem List based on transcribed orders from Dr. Ansari Leader               *Precautions:  Fall Risk, contact precautions     Assessment of current deficits   [x]? Functional mobility          [x]? ADLs           [x]? Strength                  [x]? Cognition   [x]? Functional transfers        [x]? IADLs         [x]? Safety Awareness   [x]? Endurance   []? Fine Coordination           []? ROM           []? Vision/perception    []? Sensation     []? Gross Motor Coordination [x]? Balance    []? Delirium                  []?Motor Control     []?  Communication     OT PLAN OF CARE   OT POC based on physician orders, patient diagnosis and results of clinical assessment.        Frequency/Duration: 1-3 days/wk for 1-2 weeks PRN    Specific OT Treatment Interventions to include:   * Instruction/training on adapted ADL techniques and AE recommendations to increase functional independence within precautions       * Training on energy conservation strategies, correct breathing pattern and techniques to improve independence/tolerance for self-care routine  * Functional transfer/mobility training/DME recommendations for increased independence, safety, and fall prevention  * Patient/Family education to increase follow through with safety techniques and functional independence  * Recommendation of environmental modifications for increased safety with functional transfers/mobility and ADLs  * Cognitive retraining/development of therapeutic activities to improve problem solving, judgement, memory, and attention for increased safety/participation in ADL/IADL tasks  * Sensory re-education to improve body/limb awareness, maintain/improve skin integrity, and improve hand/UE motor function  * Visual-perceptual training to improve environmental scanning, visual attention/focus, and oculomotor skills for increased safety/independence with functional transfers/mobility and ADLs  * Splinting/positioning for increased function, prevention of contractures, and improve skin integrity  * Therapeutic exercise to improve motor endurance, ROM, and functional strength for ADLs/functional transfers  * Therapeutic activities to facilitate/challenge dynamic balance, stand tolerance for increased safety and independence with ADLs  * Therapeutic activities to facilitate gross/fine motor skills for increased independence with ADLs  * Neuro-muscular re-education: facilitation of righting/equilibrium reactions, midline orientation, scapular stability/mobility, normalization of muscle tone, and facilitation of volitional active controled movement  * Positioning to improve skin integrity, interaction with environment and functional independence  * Delirium prevention/treatment  * Manual techniques for edema management        Recommended Adaptive Equipment: shower chair, TBD      Home Living: Pt lives Mother and 2 sisters  in a 1 story home with 4 step(s) to enter and no rail(s); bed/bath on main floor. Bathroom setup: tub/shower  Equipment owned: wc, walker.     Prior Level of Function: Per patient received assist with ADLs; Assist with IADLs. Walker/wc for ambulation. Driving: No  Occupation: None     Pain Level: pt c/o 7/10 pain this session, however unable to state location.     Cognition: A&O: 3/4  Not oriented to day; Follows 2 step commands with MIN verbal cues. Pt requiring increased time to complete tasks d/t increased fatigue. Memory: F+             Comprehension: G-             Problem solving: F+             Judgement/safety: F+                Communication skills: WFL; limited interactions with therapist, however pleasant and cooperative. Vision: [facility]                    Glasses: Yes                                                       Hearing: WFL               RASS: 0  CAM-ICU: (NT) Delirium     UE Assessment:  Hand Dominance: Right [x]? Left []?       ROM Strength   RUE  WFL Grossly 3+/5   LUE WFL Grossly 3+/5      Sensation: No c/o numbness/tingling in BUE extremities. Tone: WNL  Edema: Unremarkable. Functional Assessment:  AM-PAC Daily Activity Raw Score: 17/24    Initial Eval Status  Date: 3/31/22 Treatment Status  Date: 4/25/22 STGs = LTGs  Time frame: 7-14 days   Feeding Sup  Increased time and effort to complete task. Ind. Ind.         Grooming Min A  While standing at sink to wash hands. N/t Min A per last report                         Ind.         UB dressing/bathing Min A  While seated at EOB. Max verbal cues for task completion d/t fatigue. N/t MIN A per last report . Ind.         LB dressing/bathing Mod A overall  Min A  While seated on lowered surface to don/doff socks. Increased time and effort to complete with mod verbal cues. N/t MIN A per last report                      Ind.          Toileting Max A  Max A for STS from commode, Mod A for thoroughness of hygiene.     Pt incontinent of bowel during STS transfers.    Dep pt incontinent of bowel requiring assist with hygiene and clothing management                      SBA      Bed Mobility  Supine to sit:   Min A     Sit to supine:   Min A Sup to sit:  SBA     Sit to supine:   SBA                     Ind.      Functional Transfers Sit to stand:   Min A     Stand to sit:   Min A     Commode: Max A with use of grab bars. n/t MIN A sit<>stand per last report  STS: Ind.     Commode: SBA   Functional Mobility Min A with FWW  For short household distances  Bed<>bathroom.  N/t MIN A .                     Ind.         Balance Sitting:     Static: CGA    Dynamic: Min A  Standing: Min A Sitting:     Static: SBA    Dynamic: Min A  Standing: Min A  Sitting:     Static: Ind. Dynamic: Ind.  Standing: Ind. Endurance/Activity Tolerance    poor tolerance with light activity.     Fair- tolerance with light activity limited due to bowel incontinence                      WFL   Visual/  Perceptual Impaired: pt reporting increased blurriness.     Glasses: yes                                 Comments: Upon arrival pt supine in bed, agreeable to therapy session, noting pt incontinent of bowel upon bed mobility, with pt requesting female to assist with hygiene. Pt educated with regards to bed mobility, importance of increased activity. At end of session pt supine in bed with HCA present,  all lines and tubes intact, call light within reach. · Pt has made fair-  progress towards set goals.    · Continue with current plan of care      Treatment Time In:1510            Treatment Time Out: 1520                Treatment Charges: Mins Units   Ther Ex  [id number]     Manual Therapy [phone]     Thera Activities [id number] 10 1   ADL/Home Mgt [id number]     Neuro Re-ed [id number]     Group Therapy      Orthotic manage/training  [id number]     Non-Billable Time     Total Timed Treatment 10 [address] [address]

## 2022-04-25 NOTE — CARE COORDINATION
Discharge order noted. Rafi from [facility] notified. Home Health Care orders are in. Juliano Doty from [employer] in [place] notified. She said the patient's chair time is 12 noon and will re start tomorrow. Patient's mother Best notified. Cece Deras from [employer] notified and said the wheelchair will be delivered to the patient's home. Awaiting final confirmation of benefits from [facility]. Patient's mom will transport her daughter home today. Dinga Segundo RN CM  [phone]      Per Tenisha Quinn from [facility], they just need the signed service agreement and documentation of teaching the tube feeds to the mother and then she will be good to go. Signed service agreement faxed back to [facility] @ [phone]. RN notified of need for teaching documentation.   Digna Segundo RN CM  [phone]

## 2022-04-25 NOTE — PLAN OF CARE
Problem: Skin Integrity:  Goal: Will show no infection signs and symptoms  Description: Will show no infection signs and symptoms  4/25/2022 1848 by Letty Guerrero RN  Outcome: Completed  4/25/2022 1142 by Letty Guerrero RN  Outcome: Progressing  4/25/2022 0501 by Atha Bumpers, RN  Outcome: Progressing  Goal: Absence of new skin breakdown  Description: Absence of new skin breakdown  4/25/2022 1848 by Letty Guerrero RN  Outcome: Completed  4/25/2022 1142 by Letty Guerrero RN  Outcome: Progressing  4/25/2022 0501 by Atha Bumpers, RN  Outcome: Progressing     Problem: Serum Glucose Level - Abnormal:  Goal: Ability to maintain appropriate glucose levels has stabilized  Description: Ability to maintain appropriate glucose levels has stabilized  4/25/2022 1848 by Letty Guerrero RN  Outcome: Completed  4/25/2022 1142 by Letty Guerrero RN  Outcome: Progressing  4/25/2022 0501 by Atha Bumpers, RN  Outcome: Progressing     Problem: Breathing Pattern - Ineffective:  Goal: Ability to achieve and maintain a regular respiratory rate will improve  Description: Ability to achieve and maintain a regular respiratory rate will improve  4/25/2022 1848 by Letty Guerrero RN  Outcome: Completed  4/25/2022 1142 by Letty Guerrero RN  Outcome: Progressing  4/25/2022 0501 by Atha Bumpers, RN  Outcome: Progressing     Problem: Cardiac Output - Decreased:  Goal: Hemodynamic stability will improve  Description: Hemodynamic stability will improve  4/25/2022 1848 by Letty Guerrero RN  Outcome: Completed  4/25/2022 1142 by Letty Guerrero RN  Outcome: Progressing     Problem: Diarrhea:  Goal: Bowel elimination is within specified parameters  Description: Bowel elimination is within specified parameters  Outcome: Completed  Goal: Passage of soft, formed stool  Description: Passage of soft, formed stool  Outcome: Completed  Goal: Establishment of normal bowel function will improve to within specified parameters  Description: Establishment of normal bowel function will improve to within specified parameters  Outcome: Completed     Problem: Pain:  Goal: Pain level will decrease  Description: Pain level will decrease  4/25/2022 1848 by Adam Leal RN  Outcome: Completed  4/25/2022 1142 by Adam Leal RN  Outcome: Progressing  Goal: Control of acute pain  Description: Control of acute pain  4/25/2022 1848 by Adam Leal RN  Outcome: Completed  4/25/2022 1142 by Adam Leal RN  Outcome: Progressing  Goal: Control of chronic pain  Description: Control of chronic pain  4/25/2022 1848 by Adam Leal RN  Outcome: Completed  4/25/2022 1142 by Adam Leal RN  Outcome: Progressing     Problem: Chronic Conditions and Co-morbidities  Goal: Patient's chronic conditions and co-morbidity symptoms are monitored and maintained or improved  Outcome: Completed

## 2022-04-25 NOTE — PLAN OF CARE
Problem: Skin Integrity:  Goal: Will show no infection signs and symptoms  Description: Will show no infection signs and symptoms  Outcome: Progressing  Goal: Absence of new skin breakdown  Description: Absence of new skin breakdown  Outcome: Progressing     Problem: Serum Glucose Level - Abnormal:  Goal: Ability to maintain appropriate glucose levels has stabilized  Description: Ability to maintain appropriate glucose levels has stabilized  Outcome: Progressing     Problem: Breathing Pattern - Ineffective:  Goal: Ability to achieve and maintain a regular respiratory rate will improve  Description: Ability to achieve and maintain a regular respiratory rate will improve  Outcome: Progressing

## 2022-04-25 NOTE — PLAN OF CARE
Problem: Skin Integrity:  Goal: Will show no infection signs and symptoms  Description: Will show no infection signs and symptoms  4/25/2022 1142 by Baljinder Nguyen RN  Outcome: Progressing  4/25/2022 0501 by Lewis Washburn RN  Outcome: Progressing  Goal: Absence of new skin breakdown  Description: Absence of new skin breakdown  4/25/2022 1142 by Baljinder Nguyen RN  Outcome: Progressing  4/25/2022 0501 by Lewis Washburn RN  Outcome: Progressing     Problem: Serum Glucose Level - Abnormal:  Goal: Ability to maintain appropriate glucose levels has stabilized  Description: Ability to maintain appropriate glucose levels has stabilized  4/25/2022 1142 by Baljinder Nguyen RN  Outcome: Progressing  4/25/2022 0501 by Lewis Washburn RN  Outcome: Progressing     Problem: Breathing Pattern - Ineffective:  Goal: Ability to achieve and maintain a regular respiratory rate will improve  Description: Ability to achieve and maintain a regular respiratory rate will improve  4/25/2022 1142 by Baljinder Nguyen RN  Outcome: Progressing  4/25/2022 0501 by Lewis Washburn RN  Outcome: Progressing     Problem: Cardiac Output - Decreased:  Goal: Hemodynamic stability will improve  Description: Hemodynamic stability will improve  Outcome: Progressing     Problem: Pain:  Goal: Pain level will decrease  Description: Pain level will decrease  Outcome: Progressing  Goal: Control of acute pain  Description: Control of acute pain  Outcome: Progressing  Goal: Control of chronic pain  Description: Control of chronic pain  Outcome: Progressing

## 2022-04-26 NOTE — ADT AUTH CERT
Neurology [address] Day [address] (4/22/2022) by Kumar Rae RN       Review Status Review Entered   Completed 4/26/2022 11:41      Criteria Review      Care Day: 27 Care Date: 4/22/2022 Level of Care: Inpatient Floor    Guideline Day 3    Level Of Care    ( ) * Activity level acceptable    ( ) * Complete discharge planning    Clinical Status    ( ) * No infection, or status acceptable    ( ) * Isolation not needed, or status acceptable    (X) * Respiratory status acceptable    4/26/2022 11:41 AM EDT by Yue Evans      CTA bilateral    ( ) * Pain and nausea absent or adequately managed    (X) * Ventilatory status acceptable    4/26/2022 11:41 AM EDT by Yue Evans      on RA    (X) * Neurologic problems absent or stabilized    4/26/2022 11:41 AM EDT by Yue Evans      A&OX3 follows commands    ( ) * Muscle or nerve damage absent or stable    ( ) * General Discharge Criteria met    Interventions    ( ) * Intake acceptable    ( ) * No inpatient interventions needed    * Milestone   Additional Notes   DATE: 4/22/22           Pertinent Updates:   S/p peg on 4/19   Tolerating tube feeds    Required 1U Lantus and 7U of regular insulin overnight. Tube feeds at 40 cc/hr without any bloating, nausea or vomiting. Endorses pain at PEG-J site      Vitals:   BP (!) 141/92   Pulse 99   Temp 98.5 °F (36.9 °C) (Oral)   Resp 19        Abnl/Pertinent Labs/Radiology/Diagnostic Studies:   Cr 2.0   Hgb 6.9   Hct 22.6      Physical Exam:   · Lung: clear to auscultation bilaterally   · Heart: regular rate and rhythm, S1, S2 normal, no murmur, click, rub or gallop   · Abdomen: soft, mild TTP at insertion site, bowel sounds normal. No masses, no organomegaly, tube site c/d/i         MD Consults/Assessments & Plans:      *Nephro note*   IMPRESSION/RECOMMENDATIONS:     1. ESKD 3 times a week TTS via RIJ tunneled dialysis catheter.  For HD tomorrow. 2. HTN, with orthostatic hypotension, on carvediolol 12.5 mg bid    3.  MBD of CKD,  Not on binders. Phos 3.0   4. Anemia of CKD, with acute drop in hgb 6.9, continue epoetin alpha 3,000 unit 3 times/wk. S/p transfusion    5. Vitamin D deficiency, on ergocalciferol 1000 po daily   ------------------------------------------------------   6. Type I DM, poorly controlled with frequent readmissions for DKA, on SSI, lantus 1 unit nightly    7. Restless leg syndrome, on ropinirole   8. Depression, on escitalopram and Abilify   9. Hypothyroidism, on levothyroxine    10. Gastroparesis, on metoclopramide,  For EGD and pyloric dilatation with Botox injection outpatient per GI. S/p PEG-J tube placement 4/19/22   11. Nutrition: TF renal formula 40 cc/hour with H2O 30cc/hour Q 4 hours, low potassium diet       Plan:   · Continue HD three times/wk, TTS   · Continue to monitor H&H, transfuse for Hgb <7   · Continue epoetin alpha 3,000 units 3 times/wk   · Continue carvedilol to 12.5  mg bid   · Continue to monitor glucose levels   · Continue to monitor daily labs   · Continue to monitor phosphorus level   · Continue to monitor magnesium level      *Internal MD note*   Assessment:       1. Hypersensitive IDDM1-A1c 7.7% this admission   2. Nausea/Vomiting w/ Abdominal pain with Hx of diabetic gastroparesis s/p PEG-J 4/21   3. HTN, on coreg BID   4. ESRD on HD TTS   5. Hypothyroidism-TSH 5.37, FT4 1.10 normal   6. Acute on chronic anemia-s/p 3 unit PRBCs, chronic component likely due to ESRD   7. History of MDRO UTI   8. History of C. difficile infection   9. Oral Candidiasis   10. DKA - resolved   11. Acute hypoxic respiratory failure - resolved   12. Acute metabolic encephalopathy-likely due to hypoglycemia in setting of DM1 and ESRD (CTH negative, negative UDS/SDS, NH3 normal, B12 normal 1 month ago) - resolved   13.  Aspiration pneumonia - resolved       Plan:   · Hb 6.9 this AM, repeat Stat H/H   · Continue lansoprazole BID, phenergan 25 mg TID PRN to aid with abdominal pain/dyspepsia   · Discontinue Remeron   · Per Endocrine, 1U lantus BID with regular insulin LDSS q6h               - POCT q2h               - Tolerating TF @ 40 cc/hr, consider speaking to dietary about switching to bolus feeds               - Continue regular diet as tolerated   · Continue home synthroid 88 mcg daily, abilify 5 mg nightly, and lexapro 10 mg daily, ropinirole 0.25 nightly   · Continue Coreg 12.5 mg BID   · Continue oral mouth wash and orajel   · HD TThS per nephro, continue retacrit   · DME order placed for wheelchair for home going. Patient has been hospitalized for more than 20 days and has had deconditioning due to her condition. Due to increased weakness, patient will require a wheelchair for transport and mobility at home. Medications:   Benadryl 25mg iv prn 2 doses   acetaminophen 1,000 mg Oral 3 times per day   · insulin regular 0-6 Units SubCUTAneous Q6H   · insulin glargine 1 Units SubCUTAneous BID   · lansoprazole 30 mg Per G Tube BID AC   · thiamine 100 mg IntraVENous Daily   · sodium chloride flush 5-40 mL IntraVENous 2 times per day   · labetalol 10 mg IntraVENous Once   · carvedilol 12.5 mg Oral BID   · melatonin 6 mg Oral Nightly   · heparin flush 1 mL IntraVENous 2 times per day   · escitalopram 10 mg Oral Daily   · ARIPiprazole 5 mg Oral Nightly   · rOPINIRole 0.25 mg Oral Nightly   · levothyroxine 88 mcg Oral Daily         Orders:   ADULT TUBE FEEDING; PEG/J; Renal Formula; Continuous; 10; Yes; 10; Q 6 hours; 20; 30; Q 4 hours       PT/OT/SLP/CM Assessments or Notes:      OT note   Comments: OK from RN to see patient. Upon arrival, patient supine in bed, incontinent of bowel.  Pt demo poor tolerance with fair understanding of education/techniques. OT attempting to engage pt in functional transfers, however pt unable to remain continent. At end of session, patient supine in bed experiencing continuous bowel movement. Call light within reach, all lines and tubes intact.  Pt instructed on use of call light for assistance and fall prevention. Nursing notified of patient positioning and severe diarrhea limiting pt performance in therapy. Case management note   Informed that Dr is changing feeding to Bolus and may discharge over the weekend but wants Home PT/OT. [place] reluctantly agreed to home therapy for Pt.  Pt refuses LTAC. Mother will not not go against her wishes. Notified [facility] that Pt may discharge over the weekend. Authorization for wc can take a week.           Neurology [address] Day [address] (4/17/2022) by Kumar Rae RN       Review Status Review Entered   Completed 4/26/2022 11:28      Criteria Review      Care Day: 22 Care Date: 4/17/2022 Level of Care: Inpatient Floor    Guideline Day 3    Level Of Care    ( ) * Activity level acceptable    ( ) * Complete discharge planning    Clinical Status    ( ) * No infection, or status acceptable    ( ) * Isolation not needed, or status acceptable    (X) * Respiratory status acceptable    4/26/2022 11:28 AM EDT by Yue Evans      clear to auscultation bilaterally    ( ) * Pain and nausea absent or adequately managed    ( ) * Ventilatory status acceptable    (X) * Neurologic problems absent or stabilized    4/26/2022 11:28 AM EDT by Mansoor Colder, oriented, thought content appropriate    ( ) * Muscle or nerve damage absent or stable    ( ) * General Discharge Criteria met    Interventions    ( ) * Intake acceptable    ( ) * No inpatient interventions needed    * Milestone   Additional Notes   DATE: 4/17/22             Pertinent Updates:   Patient's BS have been steadily increasing overnight.  this AM, given 1U sliding scale. Overnight nurse reported multiple episodes of diarrhea. Endorses nausea, vomiting, abdominal pain. Hypertensive to 180/105, sitting pressure 113/87, will hold off on increasing antihypertensives.        Vitals:   BP (!) 180/105   Pulse 99   Temp 97.9 °F (36.6 °C) (Oral)   Resp 16       Abnl/Pertinent Labs/Radiology/Diagnostic Studies:   K 6.6   CL 94   CO2 20   Cr 2.4   Anion gap 20   Hgb 10.5   Hct 33.0         Physical Exam:   Respiratory:  CTA bilateral   Cardiovascular/Edema:  ST, RRR, no murmur gallop or rub   Gastrointestinal:  abd distended, c/o persistent abdominal pain      MD Consults/Assessments & Plans:      Nephro note*       IMPRESSION/RECOMMENDATIONS:     1. ESKD 3 times a week TTS via RIJ tunneled dialysis catheter.  Potassium level elevated today with metabolic acidosis, will do 2-hour dialysis. 2. HTN, on carvediolol 12.5 mg bid   3. MBD of CKD, binders on hold. 4. Anemia of CKD, continue epoetin alpha 3,000 unit 3 times/wk. s/p transfusion    5. Vitamin D deficiency, on ergocalciferol 1000 po daily   ------------------------------------------------------   6. Type I DM, poorly controlled with frequent readmissions for DKA, on SSI, lantus decreased to 1 unit nightly        Plan:   · HD x2 hours today   · Continue HD three times/wk    · Erythromycin 500 mg IV with HD when available   · Continue epoetin alpha 3,000 units 3 times/wk   · Continue carvedilol to 12.5  mg bid   · Amlodipine 5 mg p.o. daily   · Continue to monitor glucose levels      *Internal MD note*   Assessment:   1. Hypersensitive IDDM1-A1c 7.7% this admission   2. Nausea/Vomiting w/ Abdominal pain with Hx of gastroparesis 2/2 Hx of IDDM   3. HTN, on coreg BID   4. ESRD on HD TTS   5. Hypothyroidism-TSH 5.37, FT4 1.10 normal   6.  Acute on chronic anemia-s/p 3 unit PRBCs, chronic component likely due to ESRD   Plan:   · Continue protonix BID, remeron 15 mg nightly, phenergan 25 mg TID, erythromycin to aid with abdominal pain/dyspepsia, Hold Reglan due to diarrhea   · Per Endocrine, modified SSI (1U 100>250 BS)               - Hold D10, f/u beta-hydroxybutyrate               - POCT q4h   · Continue home synthroid 88 mcg daily, abilify 5 mg nightly, and lexapro 10 mg daily, ropinirole 0.25 nightly   · Continue Coreg 12.5 mg BID             - Hypertensive to 180/105, sitting pressure 113/87, 80/64 standing will hold off on increasing antihypertensives.    · Continue oral mouth wash   · Start on erythromycin   · Per Dr. Zohra Brand will plan for PEG-J tube on 4/18 or 4/19   · HD TThS per nephro, continue retacrit         Medications:   D10% @ 10 ml/hr   D50% 12.5g iv x1   Benadryl 25mg iv prn 2 doses   Labetalol 5mg prn iv 1 dose   Imodium 2mg po prn 1 dose   Zofran 4mg iv prn 1 dose   Togan 200mg IM prn 1 dose   insulin glargine 1 Units SubCUTAneous QAM    · insulin lispro 0-3 Units SubCUTAneous 4x Daily AC & HS   · carvedilol 12.5 mg Oral BID   · pantoprazole 40 mg Oral BID AC   · promethazine 25 mg Oral TID AC   · acetaminophen 650 mg Oral Q6H   · melatonin 6 mg Oral Nightly   · sodium chloride flush 5-40 mL IntraVENous 2 times per day   · heparin flush 1 mL IntraVENous 2 times per day   · mirtazapine 15 mg Oral Nightly   · escitalopram 10 mg Oral Daily   · ARIPiprazole 5 mg Oral Nightly   · rOPINIRole 0.25 mg Oral Nightly   · levothyroxine 88 mcg Oral Daily         Orders:   Clear Liquid; 4 carb choices diet

## 2022-04-27 ENCOUNTER — OFFICE VISIT (OUTPATIENT)
Dept: ENDOCRINOLOGY | Age: 32
End: 2022-04-27
Payer: COMMERCIAL

## 2022-04-27 VITALS
WEIGHT: 127 LBS | BODY MASS INDEX: 21.68 KG/M2 | HEART RATE: 94 BPM | HEIGHT: 64 IN | SYSTOLIC BLOOD PRESSURE: 118 MMHG | OXYGEN SATURATION: 100 % | DIASTOLIC BLOOD PRESSURE: 80 MMHG

## 2022-04-27 DIAGNOSIS — N18.6 ESRD (END STAGE RENAL DISEASE) (HCC): ICD-10-CM

## 2022-04-27 DIAGNOSIS — E10.69 TYPE 1 DIABETES MELLITUS WITH OTHER SPECIFIED COMPLICATION (HCC): Primary | ICD-10-CM

## 2022-04-27 DIAGNOSIS — E16.2 HYPOGLYCEMIA: ICD-10-CM

## 2022-04-27 DIAGNOSIS — Z91.119 DIETARY NONCOMPLIANCE: ICD-10-CM

## 2022-04-27 PROCEDURE — 3051F HG A1C>EQUAL 7.0%<8.0%: CPT | Performed by: NURSE PRACTITIONER

## 2022-04-27 PROCEDURE — 99214 OFFICE O/P EST MOD 30 MIN: CPT | Performed by: NURSE PRACTITIONER

## 2022-04-27 NOTE — PROGRESS NOTES
[address] [facility] Department of Endocrinology Diabetes and Metabolism   [address] [facility] [address]   Phone: [phone]  Fax: [phone]    Date of Service: 4/27/2022  Primary Care Physician: Casandra Rodriguez MD   Provider: Rex Woo MD     Reason for the visit:    Poorly controlled DM type 1     History of Present Illness: The history is provided by the patient. No  was used. Accuracy of the patient data is excellent.  She is accompanied today by her sister    Vikram Cortez is a very pleasant 36 y.o. female seen today for diabetes management     Vikram Cortez was diagnosed with diabetes at age 17 and currently on Lantus 1 units BID, Novolog  with meals + ss 1:50> 150     The patient has been checking blood sugar > 6 times a day and readings still highly variable   The patient has insulin pump at home but parts went  Missing while in the hospital in 2/2329    Her DM complicated with ESRD on  hemodialysis  T-Th-S 12pm chair time, 3.5 hrs    Isabel reviewed since d/c on 4/25/22 - TIR 12% avg AVG BS 95    Pt on TF in addition to meals 2 cans BID - Nepro (7 am and 9/10 pm) - was ordered Q6 hours at d/c with SS scale to coordinate    Most recent A1c results summarized below  Lab Results   Component Value Date    LABA1C 7.7 03/02/2022    LABA1C 8.7 12/08/2021    LABA1C 10.2 11/23/2021     Patient reported no hypoglycemic episodes both fasting and overnight   The patient hasn't been mindful of what has been eating and wasn't following diabetes diet as encouraged  Appetite is improving with TF boluses   I reviewed current medications and the patient has no issues with diabetes medications  Vikram Cortez is up to date with eye exam. + diabetic retinopathy   The patient performs her own feet care  Microvascular complications:  + Retinopathy, + ESRD on hemodialysis dialysis, + Neuropathy   The patient receives Flushot every year and up to date with the Pneumonia vaccine     PAST MEDICAL HISTORY   Past Medical History:   Diagnosis Date    Acute congestive heart failure ([address]     BC (acute kidney injury) ([address] 10/01/2019    Cardiac arrest ([address] 02/15/2021    Cephalgia 10/09/2019    Chronic kidney disease     Depression     Diabetes mellitus ([address]     Diabetic gastroparesis associated with type 1 diabetes mellitus ([address] 2018    Diabetic ketoacidosis ([address] 2011    Diabetic ketoacidosis with coma associated with type 1 diabetes mellitus ([address] 2013    Diabetic polyneuropathy associated with type 1 diabetes mellitus ([address] 2020    Drug use complicating pregnancy in third trimester     Endocarditis 10/31/2020    ESRD (end stage renal disease) ([address] 2020    H/O cardiovascular stress test 2021    Lexiscan    Hemodialysis patient [facility])     History of blood transfusion 2019    Hyperlipidemia 10/08/2020    Hyperosmolar hyperglycemic state (HHS) ([address] 2020    Hypothyroidism 10/08/2020    Iron deficiency anemia 10/01/2019    MDRO (multiple drug resistant organisms) resistance     MRSA (methicillin resistant Staphylococcus aureus)     back wound abcess    Non compliance w medication regimen 2016    Other disorders of kidney and ureter     Pregnancy 2016    16 weeks    Previous  delivery affecting pregnancy, antepartum 2017    Previous stillbirth or demise, antepartum 2016    Seizure ([address] 2020    Severe pre-eclampsia in third trimester 2016    Shock liver 02/15/2021    Valvular endocarditis 11/10/2020    This Diagnosis was added to the Problem List based on transcribed orders from Dr. Karl Botello   Past Surgical History:   Procedure Laterality Date    BACK SURGERY      abscess    CATHETER REMOVAL N/A 10/1/2021    PERITONEAL CATHETER REMOVAL performed by Helga Jennings MD at [address] [facility]      x2    CHOLECYSTECTOMY, LAPAROSCOPIC N/A 11/7/2019    CHOLECYSTECTOMY LAPAROSCOPIC performed by Sav Anand MD at [address] [facility] N/A 6/25/2018    COLONOSCOPY WITH BIOPSY performed by Jitendra Villarreal MD at [address] [facility] COLONOSCOPY N/A 12/18/2018    COLONOSCOPY WITH BIOPSY performed by Lacho Lang MD at [address]  1/31/2012    EF 57%    ECHO COMPL W DOP COLOR FLOW  6/10/2013         EMBOLECTOMY N/A 11/6/2020    [address] [facility], [address] [facility], YULISSA -- REQS ROOM 3 performed by Suyapa Zamudio MD at [address] [place] [address] Left 2/23/2021    LEFT LEG INCISION AND DRAINAGE, DEBRIDEMENT, WOUND VAC APPLICATION performed by Jill Bernabe DPM at [address] [place] [address] Left 5/18/2021    LEFT LEG DEBRIDEMENT BIOPSY POSS APPLICATION WOUND VAC performed by Jill Bernabe DPM at [address]  N/A 4/19/2022    EGD PEG TUBE PLACEMENT performed by Vincent Cm MD at [facility] N/A 6/26/2020    LAPAROSCOPIC INSERTION PERITONEAL DIALYSIS CATHETER performed by Sonia Gutierrez MD at [facility] [address] ESOPHAGOGASTRODUODENOSCOPY TRANSORAL DIAGNOSTIC N/A 5/30/2018    EGD ESOPHAGOGASTRODUODENOSCOPY performed by Jitendra Villarreal MD at [address] [facility] TRANSESOPHAGEAL ECHOCARDIOGRAM N/A 10/19/2020    TRANSESOPHAGEAL ECHOCARDIOGRAM WITH BUBBLE STUDY performed by Hollis De La Torre MD at [address] [place] [address] TUNNELED VENOUS PORT PLACEMENT  04/2018    UPPER GASTROINTESTINAL ENDOSCOPY  12/18/2018    EGD BIOPSY performed by Lacho Lang MD at [address] [facility] 10/11/2019    EGD ESOPHAGOGASTRODUODENOSCOPY performed by Bakari Saeed DO at [address] [facility] 7/14/2021    EGD BIOPSY performed by Jerliyn Parham MD at [facility]:   reports that she quit smoking about 14 months ago. Her smoking use included cigarettes. She has a 3.00 pack-year smoking history. She has never used smokeless tobacco.  Alcohol:   reports no history of alcohol use. Drugs:   reports previous drug use. Drug: Opiates . FAMILY HISTORY   Family History   Problem Relation Age of Onset    Asthma Mother     Hypertension Mother     High Blood Pressure Mother     Diabetes Mother     Asthma Brother     High Blood Pressure Father        ALLERGIES AND DRUG REACTIONS   Allergies   Allergen Reactions    Cefepime Other (See Comments)     \" neurological side effect- slurred speech and confusion    Toradol [Ketorolac Tromethamine] Anaphylaxis and Hives       CURRENT MEDICATIONS   Current Outpatient Medications   Medication Sig Dispense Refill    glucagon 1 MG injection Inject 1 mg into the muscle See Admin Instructions Follow package directions for low blood sugar. 1 kit 5    glucose ([facility] GLUCOSE) 4g chewable tablet Take 4 tablets by mouth as needed for Low blood sugar 60 tablet 1    carvedilol (COREG) 12.5 MG tablet Take 1 tablet by mouth 2 times daily 60 tablet 3    insulin regular (HUMULIN R;NOVOLIN R) 100 UNIT/ML injection Inject 0-4 Units into the skin every 6 hours 10 mL 3    insulin glargine (LANTUS SOLOSTAR) 100 UNIT/ML injection pen Inject 1 Units into the skin 2 times daily Please don't give if blood sugar is < 180 and tube feeds are not being given 1 pen 2    Nutritional Supplements (NEPRO/CARBSTEADY) LIQD Take 240 mLs by mouth every 6 hours Hold TF 30 min before and after synthroid.  26564 mL 3    vitamin D (ERGOCALCIFEROL) 1.25 MG (75806 UT) CAPS capsule Take 1 capsule by mouth once a week 5 capsule 0    escitalopram (LEXAPRO) 10 MG tablet Take 1 tablet by mouth daily 30 tablet 3    Glucagon 3 MG/DOSE POWD by Nasal route See Admin Instructions      hydrOXYzine (ATARAX) 25 MG tablet Take 25 mg by mouth every 8 hours as needed for Itching      epoetin nena-epbx (RETACRIT) 81560 UNIT/ML SOLN injection Inject 0.5 mLs into the skin three times a week 6.6 mL     pregabalin (LYRICA) 50 MG capsule Take 1 capsule by mouth 3 times daily for 30 days. (Patient taking differently: Take 50 mg by mouth 2 times daily. ) 90 capsule 0    cholestyramine (QUESTRAN) 4 g packet Take 1 packet by mouth 2 times daily 90 packet 0    ARIPiprazole (ABILIFY) 5 MG tablet Take 5 mg by mouth nightly      rOPINIRole (REQUIP) 0.25 MG tablet Take 0.25 mg by mouth nightly      lidocaine (XYLOCAINE) 5 % ointment Apply topically as needed. 10 g 0    polyethylene glycol (GLYCOLAX) 17 g packet Take 17 g by mouth daily as needed for Constipation 30 each 0    lansoprazole 3 MG/ML SUSP 10 mLs by Per G Tube route 2 times daily (before meals) 300 mL 1    potassium & sodium phosphates (PHOS-NAK) 280-160-250 MG PACK Take 1 packet by mouth in the morning and at bedtime 10 packet 0    white petrolatum OINT ointment Apply topically 2 times daily as needed (dry, itching skin)  0    ondansetron (ZOFRAN) 4 MG tablet Take 1 tablet by mouth every 8 hours as needed for Nausea or Vomiting 30 tablet 0    levothyroxine (SYNTHROID) 88 MCG tablet Take 1 tablet by mouth Daily 30 tablet 3    albuterol sulfate  (90 Base) MCG/ACT inhaler Inhale 4 puffs into the lungs as needed for Wheezing or Shortness of Breath 18 g 0     No current facility-administered medications for this visit. Review of Systems  Constitutional: No fever, no chills, no diaphoresis, +generalized weakness. HEENT: No blurred vision, No sore throat, no ear pain, no hair loss  Neck: denied any neck swelling, difficulty swallowing,   Cardio-pulmonary: No CP, SOB or palpitation, No orthopnea or PND. No cough or wheezing. GI: No N/V/D, no constipation, No abdominal pain, no melena or hematochezia   : Denied any dysuria, hematuria, flank pain, discharge, or incontinence. Skin: denied any rash, ulcer, Hirsute, or hyperpigmentation.    MSK: denied any joint deformity, joint pain/swelling, muscle pain, or back pain. Using wc  Neuro: no numbness, no tingling, no weakness    OBJECTIVE    /80   Pulse 94   Ht 5' 4\" (1.626 m)   Wt 127 lb (57.6 kg)   SpO2 100%   BMI 21.80 kg/m²   BP Readings from Last 4 Encounters:   04/27/22 118/80   04/25/22 (!) 155/95   04/19/22 92/64   03/04/22 (!) 132/97     Wt Readings from Last 6 Encounters:   04/27/22 127 lb (57.6 kg)   04/23/22 129 lb 13.6 oz (58.9 kg)   03/03/22 129 lb 10.1 oz (58.8 kg)   02/03/22 141 lb 12.1 oz (64.3 kg)   01/28/22 141 lb 1.5 oz (64 kg)   01/04/22 143 lb 11.8 oz (65.2 kg)     Physical examination:  General: awake alert, oriented x3, no abnormal position or movements. HEENT: normocephalic non traumatic, no exophthalmos   Neck: supple, no LN enlargement, no thyromegaly, no thyroid tenderness, no JVD. Pulm: Clear equal air entry no added sounds, no wheezing or rhonchi, +  TDC RIJ  CVS: S1 + S2, no murmur, no heave  Abd: soft lax, no tenderness, no organomegaly, audible bowel sounds. gtube to abdomen   Skin: warm, no lesions, no rash.  no Ulcers, No acanthosis nigricans   Neuro: CN intact, sensation decreased bilateral, muscle power weak - using wc  Psych: normal mood, and affect    Review of Laboratory Data:  I personally reviewed the following lab:  Lab Results   Component Value Date/Time    WBC 5.5 04/25/2022 05:20 AM    RBC 3.10 (L) 04/25/2022 05:20 AM    HGB 7.7 (L) 04/25/2022 05:20 AM    HCT 24.8 (L) 04/25/2022 05:20 AM    MCV 80.0 04/25/2022 05:20 AM    MCH 24.8 (L) 04/25/2022 05:20 AM    MCHC 31.0 (L) 04/25/2022 05:20 AM    RDW 21.4 (H) 04/25/2022 05:20 AM     04/25/2022 05:20 AM    MPV 9.8 04/25/2022 05:20 AM    BANDS 3 06/25/2012 03:15 PM      Lab Results   Component Value Date/Time     04/25/2022 05:20 AM    K 4.4 04/25/2022 05:20 AM    K 4.0 04/14/2022 04:41 AM    CO2 23 04/25/2022 05:20 AM    BUN 32 (H) 04/25/2022 05:20 AM    CREATININE 2.9 (H) 04/25/2022 05:20 AM    CALCIUM 9.0 04/25/2022 05:20 AM    LABGLOM 23 04/25/2022 05:20 AM GFRAA 23 04/25/2022 05:20 AM      Lab Results   Component Value Date/Time    TSH 5.370 (H) 03/28/2022 01:56 AM    T4FREE 1.10 03/28/2022 01:56 AM    T7UOQZE 8.6 11/05/2015 02:20 AM    FT3 1.3 (L) 10/31/2020 10:43 AM    H0IHKRW 36.73 (L) 07/20/2020 02:00 PM     Lab Results   Component Value Date    LABA1C 7.7 03/02/2022    GLUCOSE 386 04/25/2022    GLUCOSE 130 05/18/2012    MALBCR 2949.3 01/15/2020    LABMICR 1740.1 01/15/2020    LABCREA 59 01/15/2020    LABCREA 61 01/15/2020     Lab Results   Component Value Date    LABA1C 7.7 03/02/2022    LABA1C 8.7 12/08/2021    LABA1C 10.2 11/23/2021     Lab Results   Component Value Date    TRIG 82 01/14/2020    HDL 33 01/14/2020    LDLCALC 46 01/14/2020    CHOL 95 01/14/2020     Lab Results   Component Value Date    VITD25 10 02/01/2022    VITD25 8 01/17/2022       Medical Records/Labs/Images review:   I personally reviewed and summarized previous records   All labs and imaging studies were independently reviewed     Salma Tirado, a 36 y.o.-old female seen in for the following issues     Diabetes Mellitus Type 1    · Worsening control. · Pt ill with required multiple hospital admission  · Change insulin regimen to Lantus 1 U BID, add 2 units Novolog with meals + ss 1:50:150   · To start pump very soon. Will meet with our pump  in a wk   · Patient will need routine diabetes maintenance and prevention    Dietary noncompliance  · Now on TF due to nutritional deficiency - ordered Q6 but only taking BID   Discussed with patient the importance of eating consistent carbohydrate meals, avoiding high glycemic index food.  Also, discussed with patient the risk and negative consequences of dietary noncompliance on blood glucose control, blood pressure and weight    Hypoglycemia    Wishes to resume insulin  Pump   Will discuss parts missing with [employer] rep to replace   Discussed hypoglycemia management measures with patient ESRD   On HD - TTS   Per nephrology   Patient is at risk for hypoglycemia while receiving insulin due to ESRD    I personally reviewed external notes from PCP and other patient's care team providers, and personally interpreted labs associated with the above diagnosis. I also ordered labs to further assess and manage the above addressed medical conditions    Return in about 4 weeks (around 5/25/2022). The above issues were reviewed with the patient who understood and agreed with the plan. Thank you for allowing us to participate in the care of this patient. Please do not hesitate to contact us with any additional questions. Diagnosis Orders   1. Type 1 diabetes mellitus with other specified complication (HCC)  glucagon 1 MG injection   2. Dietary noncompliance     3. Hypoglycemia     4. ESRD (end stage renal disease) ([facility]       HEBER Lovell NP    [facility] - [facility] Diabetes Care and Endocrinology   [address] [facility] [address]   Phone: [phone]  Fax: [phone]  ---------------------------  An electronic signature was used to authenticate this note.  HEBER Lovell NP on 4/27/2022 at 4:16 PM

## 2022-05-03 ENCOUNTER — TELEPHONE (OUTPATIENT)
Dept: INTERNAL MEDICINE | Age: 32
End: 2022-05-03

## 2023-10-04 NOTE — ED PROVIDER NOTES
Patient called in stating he was supposed to be on surgery schedule for 10/05/2023 and he never was called so he called surgery center  and they said they were calling people and that they would be calling him well they never did and it seems that he never got on schedule in [facility] so I need an extension from workers comp in order to then put him on for October 12 since it never went through [facility] and got scheduled   thanks  let me know Patient was received in sign out pending lab work   Patient was found to have electrolyte disturbance with nausea and vomiting, case discussed with nephrology and patient will be admitted     See other provider note for HPI AMPARO TEAGUE and MDM     Nancy Rehman MD  12/18/21 [id number]

## 2023-10-05 NOTE — PROGRESS NOTES
generalized weakness, pain and nausea, and having uncontrolled glucose levels. In the emergency room she was found to have a glucose level of 874, beta hydroxybutyrate of 3.94. She was admitted to MICU. Problems resolved. · Severe hypoalbuminemia/malnutrition      IMPRESSION/RECOMMENDATIONS:      1. ESRD on peritoneal dialysis, to continue CCPD 4 exchanges over 10 hours of 1.5% with long dwell of 2.5%  2. HTN, on hydralazine 10 mg twice daily and metoprolol 25 mg daily  -------------------------------------------------------  3. UTI, on piperacillin-tazobactam and linezolid   4. MBD of CKD, on sevelamer  5.  Anemia of CKD, on epoetin alpha      Plan:    · Continue CCPD 4 exchanges over 10 hours of 1.5% with long dwell of 2.5%  · Continue to monitor electrolytes  · Continue epoetin alpha 3000 units 3 times a week        Electronically signed by Walker Manzano MD on 7/1/2021 at 1:49 PM none

## 2023-11-02 NOTE — ED NOTES
11/2/2023       RE: Mikayla Timmons  [address]  [facility] [address]     Dear Colleague,    Thank you for referring your patient, Mikayla Timmons, to the [facility] W[facility] [place] at [facility]. Please see a copy of my visit note below.    Chief Complaint:   Chief Complaint   Patient presents with    RECHECK     Mikayla, is being seen today for a wound follow up right shin.          Allergies   Allergen Reactions    Hydromorphone Itching    Ketamine Other (See Comments)         Subjective: Mikayla is a 82 year old female who presents to the clinic today for a follow up of right leg wound.  I usually see her in orthopedics for her foot care.  She notes that over the last couple weeks, she had blistering on the right leg.  This does sometimes cause her pain.  She has compression socks, but these are very difficult for her to get on.    Objective  Data Unavailable Data Unavailable Data Unavailable Data Unavailable Data Unavailable 0 lbs 0 oz  There is a tense bulla noted to the right anterior leg.  No signs or symptoms of infection noted.  Hemosiderin deposit noted bilateral legs.  Brawny edema noted to bilateral legs.    Assessment:   Encounter Diagnoses   Name Primary?    Other specified peripheral vascular diseases (H24) Yes    PVD (peripheral vascular disease) (H24)          Plan:   - Pt seen and evaluated  -She does have venous stasis.  I recommended she go into a right Unna's boot, however she does have some incontinence.  Because this, and her inability to get on compression socks, I recommend that she just watch the blister for now.  She can cover with a Mepilex.  She is getting new Velcro compression garments in January.  - Pt to return to clinic in 2 weeks to see how she is doing.          Again, thank you for allowing me to participate in the care of your patient.      Sincerely,    Swapnil Baez DPM     Bed: 22  Expected date:   Expected time:   Means of arrival:   Comments:  Grayson Singh RN  10/31/20 [id number]

## 2024-01-17 NOTE — PROGRESS NOTES
. Obtained verbal consent from alert and oriented patient at bedside and witnessed by dialysis RN Cheryle Phillips. Dialysis consent placed in chart.  YANCI [FreeTextEntry2] : concussion follow up

## 2024-01-30 NOTE — PROGRESS NOTES
Occupational Therapy      OT consult received and appreciated. Chart reviewed. Pt was observed ambulating in hallway and sitting EOB upon arrival. Pt reports she is independent with ADLs and has no acute needs for OT services at this time. Pt screened, strength and ROM WFL. Pt will be taken off of OT services at this time, please re-consult if any changes occur. Thank you.  Kaya Byers, OTR/L #[id number] Thank you for allowing us to care for you today and we hope you feel better soon  Tylenol/Motrin as needed for fever and discomfort  For worsening pain go to the ED  Need CT scan or U/S of kidney to R/O any abnormality

## 2024-05-02 NOTE — PROGRESS NOTES
Caller:  NU    Relationship: SELF    Best call back number:  [phone]    What orders are you requesting (i.e. lab or imaging): LABS    In what timeframe would the patient need to come in: ASAP    Where will you receive your lab/imaging services: [facility] [place]     Additional notes: PATIENT IS WANTING A COMPLETE BLOOD WORKUP SO PATIENT CAN GET INFORMED ABOUT THEIR NUMBERS.     INSULIN  A1C  CMP  LIPID PANEL - APOB  CBC  VIT D  MAG  CRP  OMEGA CHECK    PLEASE CALL PATIENT TO ADVISE. THANK YOU!   Pulmonary/Critical Care Progress Note    We are following patient for diabetes mellitus type 1 with DKA, hypoglycemia, swelling with blisters of left leg which will require incision and drainage tomorrow, acute respiratory failure, pneumonia (possibly aspiration), Serratia marcescens in sputum, severe hypertension, chronic kidney disease on peritoneal dialysis. SUBJECTIVE:  Patient seems to be overall somewhat more comfortable. However, she continues, as always, to complain of total body pain. Her peritoneal dialysis is proceeding acceptably. Patient has been seen by the podiatry service and the blistering lesion in her left leg will require surgical attention tomorrow. She continues on vancomycin dosed according to blood levels, as well as meropenem modified for chronic kidney disease. Her oral intake has improved and her insulin drip has been stopped. She is now on a sliding scale and small dose of insulin glargine long-acting every night.     MEDICATIONS:   metoprolol tartrate  25 mg Oral BID    Vitamin D  1,000 Units Oral Daily    vitamin D  50,000 Units Oral Weekly    insulin glargine  6 Units Subcutaneous Nightly    pantoprazole  40 mg Oral QAM AC    Calcium Acetate (Phos Binder)  1,334 mg Oral TID WC    meropenem  500 mg Intravenous Q24H    insulin lispro  0-12 Units Subcutaneous 4x Daily AC & HS    sodium chloride (PF)  10 mL Intravenous Daily    ipratropium-albuterol  1 ampule Inhalation Q6H    epoetin nena-epbx  3,000 Units Subcutaneous Once per day on Mon Wed Fri    And    epoetin nena-epbx  2,000 Units Subcutaneous Once per day on Mon Wed Fri    levothyroxine  75 mcg Per NG tube QAM AC    sodium chloride flush  10 mL Intravenous 2 times per day    heparin (porcine)  5,000 Units Subcutaneous 3 times per day    vancomycin (VANCOCIN) intermittent dosing (placeholder)   Other RX Placeholder      sodium chloride      dextrose Stopped (02/16/21 1400) oxyCODONE-acetaminophen **OR** oxyCODONE-acetaminophen, fentanNYL, perflutren lipid microspheres, sodium chloride, potassium chloride, glucose, dextrose, glucagon (rDNA), dextrose, sodium chloride flush, polyethylene glycol, acetaminophen **OR** acetaminophen      REVIEW OF SYSTEMS:  Constitutional: Denies fever, weight loss, night sweats, and fatigue  Skin: Denies pigmentation, dark lesions, and rashes   HEENT: Denies hearing loss, tinnitus, ear drainage, epistaxis, sore throat, and hoarseness. Cardiovascular: Denies palpitations, chest pain, and chest pressure. Respiratory: Denies cough, dyspnea at rest, hemoptysis, apnea, and choking. Gastrointestinal: Denies nausea, vomiting, poor appetite, diarrhea, heartburn or reflux  Genitourinary: Denies dysuria, frequency, urgency or hematuria  Musculoskeletal: Denies myalgias, muscle weakness, and bone pain. Complains of pain in left leg where her blistering lesion is  Neurological: Denies dizziness, vertigo, headache, and focal weakness  Psychological: Denies anxiety and depression  Endocrine: Denies heat intolerance and cold intolerance  Hematopoietic/Lymphatic: Denies bleeding problems and blood transfusions    OBJECTIVE:  Vitals:    02/22/21 1300   BP: 98/83   Pulse: 111   Resp: 19   Temp:    SpO2: 100%     FiO2 : 50 %  O2 Flow Rate (L/min): 6 L/min  O2 Device: High flow nasal cannula    PHYSICAL EXAM:  Constitutional: No fever, chills, diaphoresis. Oxygen saturation is currently 95% on 4 L/min nasal cannula  Skin: Skin blisters left leg with some erythema  HEENT: Unremarkable  Neck: No JVD, lymphadenopathy, thyromegaly  Cardiovascular: S1, S2 normal.  No S3 murmurs rubs present  Respiratory: Fine inspiratory crackles over both posterior lung fields. No wheezing is present  Gastrointestinal: Soft, flat, nontender  Genitourinary: No CVA tenderness  Extremities: Leg lesion as above.   No clubbing or cyanosis or edema at this time Neurological: Moves all extremities. No focal deficits  Psychological: Markedly depressed affect    LABS:  WBC   Date Value Ref Range Status   02/22/2021 8.6 4.5 - 11.5 E9/L Final   02/21/2021 9.8 4.5 - 11.5 E9/L Final   02/20/2021 17.3 (H) 4.5 - 11.5 E9/L Final     Hemoglobin   Date Value Ref Range Status   02/22/2021 8.0 (L) 11.5 - 15.5 g/dL Final   02/21/2021 7.7 (L) 11.5 - 15.5 g/dL Final   02/20/2021 8.3 (L) 11.5 - 15.5 g/dL Final     Hematocrit   Date Value Ref Range Status   02/22/2021 24.6 (L) 34.0 - 48.0 % Final   02/21/2021 23.6 (L) 34.0 - 48.0 % Final   02/20/2021 24.3 (L) 34.0 - 48.0 % Final     MCV   Date Value Ref Range Status   02/22/2021 89.8 80.0 - 99.9 fL Final   02/21/2021 88.7 80.0 - 99.9 fL Final   02/20/2021 87.1 80.0 - 99.9 fL Final     Platelets   Date Value Ref Range Status   02/22/2021 328 130 - 450 E9/L Final   02/21/2021 285 130 - 450 E9/L Final   02/20/2021 238 130 - 450 E9/L Final     Sodium   Date Value Ref Range Status   02/22/2021 139 132 - 146 mmol/L Final   02/21/2021 142 132 - 146 mmol/L Final   02/20/2021 142 132 - 146 mmol/L Final     Potassium   Date Value Ref Range Status   02/22/2021 3.7 3.5 - 5.0 mmol/L Final   02/21/2021 3.7 3.5 - 5.0 mmol/L Final   02/20/2021 4.1 3.5 - 5.0 mmol/L Final     Potassium reflex Magnesium   Date Value Ref Range Status   02/15/2021 5.4 (H) 3.5 - 5.0 mmol/L Final   01/24/2021 5.7 (H) 3.5 - 5.0 mmol/L Final     Comment:     Specimen is moderately Hemolyzed. Result may be artificially increased. 12/28/2020 6.0 (H) 3.5 - 5.0 mmol/L Final     Comment:     Specimen is moderately Hemolyzed. Result may be artificially increased.      Chloride   Date Value Ref Range Status   02/22/2021 98 98 - 107 mmol/L Final   02/21/2021 100 98 - 107 mmol/L Final   02/20/2021 100 98 - 107 mmol/L Final     CO2   Date Value Ref Range Status   02/22/2021 24 22 - 29 mmol/L Final   02/21/2021 23 22 - 29 mmol/L Final   02/20/2021 23 22 - 29 mmol/L Final     BUN Date Value Ref Range Status   02/22/2021 50 (H) 6 - 20 mg/dL Final   02/21/2021 48 (H) 6 - 20 mg/dL Final   02/20/2021 49 (H) 6 - 20 mg/dL Final     CREATININE   Date Value Ref Range Status   02/22/2021 8.5 (HH) 0.5 - 1.0 mg/dL Final   02/21/2021 8.1 (HH) 0.5 - 1.0 mg/dL Final   02/20/2021 8.2 (HH) 0.5 - 1.0 mg/dL Final     Glucose   Date Value Ref Range Status   02/22/2021 173 (H) 74 - 99 mg/dL Final   02/21/2021 242 (H) 74 - 99 mg/dL Final   02/20/2021 130 (H) 74 - 99 mg/dL Final   05/18/2012 130 (H) 70 - 110 mg/dL Final   04/26/2012 61 (L) 70 - 110 mg/dL Final   04/25/2012 196 (H) 70 - 110 mg/dL Final     Calcium   Date Value Ref Range Status   02/22/2021 8.8 8.6 - 10.2 mg/dL Final   02/21/2021 8.4 (L) 8.6 - 10.2 mg/dL Final   02/20/2021 8.1 (L) 8.6 - 10.2 mg/dL Final     Total Protein   Date Value Ref Range Status   02/22/2021 5.9 (L) 6.4 - 8.3 g/dL Final   02/21/2021 5.8 (L) 6.4 - 8.3 g/dL Final   02/20/2021 5.7 (L) 6.4 - 8.3 g/dL Final     Albumin   Date Value Ref Range Status   02/22/2021 2.6 (L) 3.5 - 5.2 g/dL Final   02/21/2021 2.7 (L) 3.5 - 5.2 g/dL Final   02/20/2021 2.6 (L) 3.5 - 5.2 g/dL Final   05/18/2012 4.1 3.2 - 4.8 g/dL Final   04/23/2012 4.0 3.2 - 4.8 g/dL Final   04/02/2012 4.8 3.2 - 4.8 g/dL Final     Total Bilirubin   Date Value Ref Range Status   02/22/2021 0.3 0.0 - 1.2 mg/dL Final   02/21/2021 0.3 0.0 - 1.2 mg/dL Final   02/20/2021 0.3 0.0 - 1.2 mg/dL Final     Alkaline Phosphatase   Date Value Ref Range Status   02/22/2021 241 (H) 35 - 104 U/L Final   02/21/2021 228 (H) 35 - 104 U/L Final   02/20/2021 240 (H) 35 - 104 U/L Final     AST   Date Value Ref Range Status   02/22/2021 17 0 - 31 U/L Final     Comment:     Specimen is slightly Hemolyzed. Result may be artificially increased. 02/21/2021 16 0 - 31 U/L Final     Comment:     Specimen is slightly Hemolyzed. Result may be artificially increased.    02/20/2021 26 0 - 31 U/L Final     Comment: Specimen is slightly Hemolyzed. Result may be artificially increased. ALT   Date Value Ref Range Status   02/22/2021 38 (H) 0 - 32 U/L Final   02/21/2021 53 (H) 0 - 32 U/L Final   02/20/2021 88 (H) 0 - 32 U/L Final     GFR Non-   Date Value Ref Range Status   02/22/2021 7 >=60 mL/min/1.73 Final     Comment:     Chronic Kidney Disease: less than 60 ml/min/1.73 sq.m. Kidney Failure: less than 15 ml/min/1.73 sq.m. Results valid for patients 18 years and older. 02/21/2021 7 >=60 mL/min/1.73 Final     Comment:     Chronic Kidney Disease: less than 60 ml/min/1.73 sq.m. Kidney Failure: less than 15 ml/min/1.73 sq.m. Results valid for patients 18 years and older. 02/20/2021 7 >=60 mL/min/1.73 Final     Comment:     Chronic Kidney Disease: less than 60 ml/min/1.73 sq.m. Kidney Failure: less than 15 ml/min/1.73 sq.m. Results valid for patients 18 years and older. GFR    Date Value Ref Range Status   02/22/2021 7  Final   02/21/2021 7  Final   02/20/2021 7  Final     Magnesium   Date Value Ref Range Status   02/21/2021 1.9 1.6 - 2.6 mg/dL Final   02/17/2021 1.6 1.6 - 2.6 mg/dL Final   02/16/2021 1.6 1.6 - 2.6 mg/dL Final     Phosphorus   Date Value Ref Range Status   02/22/2021 8.3 (H) 2.5 - 4.5 mg/dL Final   02/21/2021 8.9 (H) 2.5 - 4.5 mg/dL Final   02/20/2021 10.0 (HH) 2.5 - 4.5 mg/dL Final     Recent Labs     02/20/21  0519   PH 7.398   PO2 86.8   PCO2 33.1*   HCO3 20.0*   BE -4.3*   O2SAT 95.7       RADIOLOGY:  XR CHEST PORTABLE   Final Result   No change in bilateral airspace opacities. US DUP LOWER EXTREMITY LEFT RODO   Final Result   No evidence of DVT in the left lower extremity. XR CHEST PORTABLE   Final Result   1. No interval change in the multifocal bilateral diffuse hazy pulmonary   infiltrates.       XR CHEST PORTABLE   Final Result   Persistent but perhaps slightly improved bilateral diffuse ill-defined opacities left slightly greater than right. RECOMMENDATION:   Continued radiographic follow-up suggested. XR CHEST PORTABLE   Final Result   Improvement in right-sided opacities and worsening in left-sided airspace   opacities. Finding may represent pulmonary edema versus atelectasis. XR CHEST PORTABLE   Final Result   Extensive primarily right-sided pulmonary infiltrates new since the prior exam      CT HEAD WO CONTRAST   Final Result   No acute intracranial abnormality. XR ABDOMEN FOR NG/OG/NE TUBE PLACEMENT   Final Result   1. ET tube in satisfactory position. No evidence of acute intrathoracic   disease. 2. NG tube tip in the distal stomach. 3. Nonspecific small bowel distension that could be related to ileus or   small-bowel obstruction. 4. Suspected ascites. XR CHEST PORTABLE   Final Result   1. ET tube in satisfactory position. No evidence of acute intrathoracic   disease. 2. NG tube tip in the distal stomach. 3. Nonspecific small bowel distension that could be related to ileus or   small-bowel obstruction. 4. Suspected ascites. XR TIBIA FIBULA LEFT (2 VIEWS)    (Results Pending)   XR CHEST PORTABLE    (Results Pending)           PROBLEM LIST:  Principal Problem:    Cardiac arrest ([address]  Active Problems:    Diabetic ketoacidosis with coma associated with type 1 diabetes mellitus ([address]    UTI (urinary tract infection)    Diabetes mellitus type 1, uncontrolled ([address]    Sepsis ([address]    Hypertension    Acute respiratory failure with hypoxia ([address]    ESRD on peritoneal dialysis ([address]    Shock liver    Bacterial pneumonia  Resolved Problems:    * No resolved hospital problems. *      IMPRESSION:  1. Status post PEA arrest  2. Diabetes mellitus type 1 with DKA, improved  3. Initial,, improved  4. End-stage renal disease on peritoneal dialysis  5. Bilateral pneumonia with Serratia marcescens growing in sputum  6. Sepsis  7. History of hypertension tachycardia  8.  Anemia 9. Acute hypoxemic respiratory failure  10. Clinical depression    PLAN:  1. Continue metoprolol for now  2. Continue meropenem and vancomycin with appropriate renal adjustments  3. Heparin prophylaxis  4. Wean FiO2  5. Increase activity  6. For surgery involving debridement and drainage of lesion of left leg  7. Continue peritoneal dialysis  8. Chest x-ray in a.m. 9. Continue to increase activity as possible      ATTESTATION:  ICU Staff Physician note of personal involvement in Care  As the attending physician, I certify that I personally reviewed the patients history and personally examined the patient to confirm the physical findings described above,  And that I reviewed the relevant imaging studies and available reports. I also discussed the differential diagnosis and all of the proposed management plans with the patient and individuals accompanying the patient to this visit. They had the opportunity to ask questions about the proposed management plans and to have those questions answered. This patient has a high probability of sudden, clinically significant deterioration, which requires the highest level of physician preparedness to intervene urgently. I managed/supervised life or organ supporting interventions that required frequent physician assessment. I devoted my full attention to the direct care of this patient for the amount of time indicated below. Time I spent with the family or surrogate(s) is included only if the patient was incapable of providing the necessary information or participating in medical decisions  Time devoted to teaching and to any procedures I billed separately is not included.     CRITICAL CARE TIME:  34 minutes    Electronically signed by Nemesio Peters MD on 2/22/2021 at 2:14 PM

## 2024-05-23 NOTE — PROGRESS NOTES
[facility]  [facility] Residency Program  Progress Note - House Team     Patient:  Kiera Dorsey 36 y.o. female MRN: [id number]     Date of Service: 4/8/2022     CC: Altered Mental Status     Subjective   Brief Summary:    Ms. Mckenzie Carballo is a 36year old female who was found obtunded and severely hypoglycemic at her nursing home on 3/27. She has a past medical history of ESRD on hemodialysis, poorly controlled TIDM, HTN, hypothyroidism, and depression. She was intubated for airway protection and admitted to the [place] [id number] ICU admission and treatment with antibiotics and steroids, patient's status improved significantly. Adrenal Insufficiency workup negative. She is currently being managed for brittle type 1 diabetes. Hospital Day: 13    Overnight Events:  Patient became hypoglycemic at 65 this morning. Received D50 and normalized back to 106. This AM     Patient was seen and examined this morning at bedside   Patient is resting comfortably and is in no acute distress. She is feeling significantly better than the past few days. Her nausea, vomiting, diarrhea and abdominal pain have resolved. Patient is now tolerating foods. She denies fevers, chills, chest pain. Objective     Physical Exam:  · Vitals: /78   Pulse 102   Temp 98.6 °F (37 °C) (Oral)   Resp 16   Ht 5' 4\" (1.626 m)   Wt 138 lb (62.6 kg)   SpO2 92%   BMI 23.69 kg/m²     · I & O - 24hr: No intake/output data recorded.    · General Appearance: alert, appears stated age and cooperative  · Lung: clear to auscultation bilaterally  · Heart: tachycardic rhythm, S1, S2 normal, no murmur, click, rub or gallop  · Abdomen: soft, non-tender; bowel sounds normal; no masses,  no organomegaly  · Extremities:  extremities normal, atraumatic, no cyanosis or edema  · Neurologic: Mental status: Alert, oriented, thought content appropriate  Subject  Pertinent Labs & Imaging Studies   jorge alberto  CBC:   Lab Results Component Value Date    WBC 3.8 04/07/2022    RBC 2.69 04/07/2022    HGB 10.2 04/07/2022    HCT 32.9 04/07/2022    MCV 81.0 04/07/2022    MCH 25.3 04/07/2022    MCHC 31.2 04/07/2022    RDW 19.3 04/07/2022     04/07/2022    MPV 10.1 04/07/2022     CMP:    Lab Results   Component Value Date     04/08/2022    K 3.8 04/08/2022    K 3.7 03/04/2022     04/08/2022    CO2 25 04/08/2022    BUN 7 04/08/2022    CREATININE 2.4 04/08/2022    GFRAA 29 04/08/2022    LABGLOM 29 04/08/2022    GLUCOSE 113 04/08/2022    GLUCOSE 130 05/18/2012    PROT 5.8 04/07/2022    LABALBU 3.2 04/07/2022    LABALBU 4.1 05/18/2012    CALCIUM 9.0 04/08/2022    BILITOT 0.3 04/07/2022    ALKPHOS 152 04/07/2022    AST 10 04/07/2022    ALT 12 04/07/2022       XR CHEST PORTABLE   Final Result   1. The lungs are clear. There is no infiltrate or effusion. 2. Stable position of the support lines and tubes. XR CHEST PORTABLE   Final Result   1. There is no acute cardiopulmonary disease   2. Stable position of the support lines and tubes. XR CHEST PORTABLE   Final Result   Infiltrate and or atelectasis at the left lower lobe. Substantially resolved   infiltrate and or atelectasis on the right. New NG tube. CT HEAD WO CONTRAST   Final Result   No acute intracranial abnormality or hemorrhage. CT ABDOMEN PELVIS WO CONTRAST Additional Contrast? None   Final Result   1. Moderate ascites throughout abdomen and pelvis. 2.  Multiple thick walled loops of small bowel throughout the abdomen could   suggest nonspecific infectious or inflammatory enteritis. 3.  Generalized body wall edema. 4.  Small bilateral pleural effusions with adjacent atelectatic changes. XR ABDOMEN FOR NG/OG/NE TUBE PLACEMENT   Final Result   Enteric tube tip in the body of the stomach. XR CHEST PORTABLE   Final Result   Right perihilar opacity.          XR CHEST PORTABLE    (Results Pending)        Resident's Assessment and Plan     Assessment and Plan:    1. TIDM  - Endo following.   - highly sensitive to insulin  - pt became hypoglycemic following 1 unit of lantus yesterday. Improved with D50.  - Dr. Cristin Doe is working on getting insulin pen with half unit insulin for home dosage. - patient's condition is resolving.      2. Abdominal Pain/Nausea/Vomiting/Diarrhea  - resolved   - PRN GI cocktail, reglan  - continue bentyl 10mg TID     3. End Stage Renal Disease  - Continue dialysis Tues/Thurs/Saturday     4. Hypothyroidism  - continue levothyroxine 88mg daily.      5. Depression  - continue Abilify and Celexa     6.  Hypertension  - continue cardizem     PT/OT evaluation: not indicated  DVT prophylaxis/ GI prophylaxis: heparin/pepcid  Disposition: continue current care     Rogelio Chan, MS4  Attending physician: Dr. Chau Guardado no

## 2024-06-06 NOTE — DISCHARGE SUMMARY
"  Problem: Adult Behavioral Health Plan of Care  Goal: Plan of Care Review  Outcome: Progressing     Problem: Psychotic Signs/Symptoms  Goal: Improved Mood Symptoms (Psychotic Signs/Symptoms)  Outcome: Progressing     Milieu Participation/Behavior: Basil has been awake and visible on the unit. Spent most of the shift in the lounge, watching TV. He showered and shaved this morning.   Initially this morning pt appeared bright and approachable. He repeated writers name aloud several times aloud. Later pt appeared more tense and would not respond verbally to writers inquiries or assessment questions. When asked if he was experiencing thought disturbances or paranoia, pt denied by gesturing 'zero'. Also denying anxiety or depression. Gave a thumbs up.   Pt was observed sitting in the lounge this afternoon, rubbing and scratching his head and repeating the name of a peer over and over. He appears to stare intensely at staff or at himself in the mirror at times.     1300-He reported to Provider elevated anxiety rated 10/10. PRN Ativan given which he reported good efficacy.     Compliant with scheduled medications.     Safety:   15 minute rounds  Sexual precautions  Suicide precautions    SI/Self harm:   Denies, no evidence of NSSI behavior    Aggression/agitation/HI:   Calm, no agitation or aggressive behavior    AVH:   Denies    Affect:  Labile-alternates bright to tense and paranoid    Mood:  Per pt- \"good\" (gave a thumbs up gesture)  Calm, generally cooperative with care        PRN Med:  1313-Ativan    Medication AE:   Denies     I & O:  NO concerns-eating and hydrating well    Sleep:   NO concerns reported, per staff slept 6 hours overnight    ADLs:  Independent, well kempt    Physical Complaints/Issues:   Denies    Vitals:    BP (!) 141/79 (Patient Position: Sitting, Cuff Size: Adult Regular)   Pulse 79   Temp 97.5  F (36.4  C)   Resp 16   Ht 1.854 m (6' 1\")   Wt 96.2 kg (212 lb)   SpO2 99%   BMI 27.97 kg/m  " [facility] Physician Discharge Summary       Edwar Tian, [address] [facility] [address] [address]  [phone]    Schedule an appointment as soon as possible for a visit in 1 week  Hospital follow up      Activity level: As tolerated     Diet: No diet orders on file    Labs:None    Condition at discharge: Stable    Dispo:Home      Patient ID:  Heri Taveras  [id number]  36 y.o.  1990    Admit date: 6/21/2021    Discharge date and time:  7/2/2021    Admission Diagnoses: Principal Problem:    DKA, type 1, not at goal [facility])  Active Problems:    Uncontrolled type 1 diabetes mellitus with hyperglycemia, with long-term current use of insulin ([facility]    ESRD on peritoneal dialysis ([facility]    Acute cystitis    Hypertension    Hyperosmolality    Major depressive disorder  Resolved Problems:    * No resolved hospital problems. *      Discharge Diagnoses: Principal Problem:    DKA, type 1, not at goal [facility])  Active Problems:    Uncontrolled type 1 diabetes mellitus with hyperglycemia, with long-term current use of insulin ([facility]    ESRD on peritoneal dialysis ([facility]    Acute cystitis    Hypertension    Hyperosmolality    Major depressive disorder  Resolved Problems:    * No resolved hospital problems. *      Consults:  IP CONSULT TO CRITICAL CARE  IP CONSULT TO NEPHROLOGY  IP CONSULT TO NEPHROLOGY  IP CONSULT TO UROLOGY  IP CONSULT TO INFECTIOUS DISEASES    Procedures: None    HPI:   This is a 36 y.o. female  has a past medical history of ESRD, Diabetes mellitus, Hyperosmolar hyperglycemic state presented with Generalized weakness, Generalized pain, uncontrolled blood sugar for last few days prior to arrival to the hospital.  She has been reading high on her machine approximately 600 she states for the past several days since Saturday.  She also complains of having some nausea generalized fatigue and arthralgias as well as shortness of breath whenever she stands.  Associated with some dysuria.  The patient was seen and examined at bedside, appears alert and awake with no acute distress and is able to answer simple  questions. On direct questioning, patient denied any resting ongoing chest pain, resting SOB, hemoptysis, productive cough, fever, ongoing palpitation, active abdominal pain, hematemesis, rectal bleeding, diana, hematuria, any other  and GI complaints and any new focal neuro deficits. Hospital Course: 35 yo female admitted as per above details. See outline below for additional details and issues addressed this admission:        6/27: States her lower abdominal and pelvic pain is worse today. Requiring regular oxycodone and fentanyl. Blood glucose has been reasonably controlled.     6/28: Patient still having lower abdominal/pelvic pain. No improvement from yesterday. She is asking if she can have a shower today.     6/29: Patient still having a lot of cramping in bladder and only gets about 30 minutes of relief after dose of Fentanyl.     6/30: Patient feeling better after shower. She was sitting up in chair.     7/1: Patient states having a lot of cramping today in her lower abdomen. She is wondering if she get a one-time dose of pain medication. She is also asking for loperamide which she takes at home to control her frequent loose stools. 7/2: Patient feeling better and would like to go home today. 1.  HHS/possible early DKA in the setting of uncontrolled type 1 DM   -resolved with IV fluids and insulin drip protocol  -Continue subcutaneous insulin regimen. Glucose has been reasonably controlled     2. Recurrent UTI/acute cystitis   -Ceftriaxone changed to Zosyn (day #10) given Enterococcus infection in the past. Lost IV access today and per my discussion okay to stop Zosyn today.   -Urine culture shows mixed gram-positive organisms.   Repeat urinalysis and culture was ordered, however she only makes urine once every few days.    -Other hospitalist was Pulse: 109  92 90   Resp: 14  16 16   Temp: 97.7 °F (36.5 °C)  98 °F (36.7 °C) 97.9 °F (36.6 °C)   TempSrc:   Oral Oral   SpO2: 98%  95%    Weight:       Height:         General Appearance: Alert and oriented x3. Skin: warm and dry  Head: normocephalic and atraumatic  Eyes: pupils equal, round, and reactive to light, extraocular eye movements intact, conjunctivae normal  Neck: neck supple and non tender without mass   Pulmonary/Chest: Nonlabored on room air. Clear to auscultation bilaterally. Cardiovascular: normal rate, normal S1 and S2 and no carotid bruits  Abdomen: Soft, lower abdominal tenderness without peritoneal signs, nondistended, normal bowel sounds. Peritoneal dialysis catheter in place. No palpable organomegaly.   No costovertebral angle tenderness Extremities: no cyanosis, no clubbing and no edema, left leg scar from prior ulcerating wound but no open wound  Neurologic: no cranial nerve deficit and speech normal            LABS:    06/29/21  0600 06/30/21  0500 07/01/21  1024    140 141   K 4.4 4.5 5.0    100 101   CO2 25 27 20*   BUN 52* 45* 43*   CREATININE 7.8* 7.2* 7.1*   GLUCOSE 284* 222* 343*   CALCIUM 8.9 9.0 9.1               Recent Labs     06/29/21  0600 06/30/21  0500 07/01/21  1024   ALKPHOS 120* 116* 165*   PROT 5.6* 5.4* 6.3*   LABALBU 3.6 3.5 3.7   BILITOT <0.2 <0.2 <0.2   AST 30 23 84*   ALT 27 25 54*               Recent Labs     06/29/21  0600 06/30/21  0500 07/01/21  1024   WBC 6.4 5.8 6.7   RBC 2.55* 2.33* 2.87*   HGB 7.7* 7.3* 8.8*   HCT 25.5* 23.0* 28.9*   .0* 98.7 100.7*   MCH 30.2 31.3 30.7   MCHC 30.2* 31.7* 30.4*   RDW 16.8* 17.0* 17.3*    321 422   MPV 9.6 9.6 9.7               Culture, Urine [[id number]] (Abnormal)  Collected: 06/29/21 1130     Specimen: Urine voided Updated: 07/01/21 0951       Organism Enterococcus faecium       Urine Culture, Routine --       >100,000 CFU/ml   Vancomycin resistant Enterococci isolated      Narrative:       Refills: 3    Associated Diagnoses: Type 1 diabetes mellitus with other specified complication (HCC)      Insulin Pen Needle (BD PEN NEEDLE NAV U/F) 32G X 4 MM MISC Uses with insulin 4 times a day  Qty: 250 each, Refills: 5    Associated Diagnoses: Type 1 diabetes mellitus with other specified complication ([facility]      ! ! blood glucose monitor strips Freestyle Lite Strips. Checks 4 times/day before meals and at bedtime and as needed for symptoms of irregular blood glucose. Qty: 250 strip, Refills: 5    Associated Diagnoses: Type 1 diabetes mellitus with other specified complication (HCC)      mupirocin (BACTROBAN) 2 % ointment Apply 15 g topically daily Apply topically daily to left leg.       folic acid (FOLVITE) 1 MG tablet Take 1 tablet by mouth daily  Qty: 30 tablet, Refills: 3      mirtazapine (REMERON) 15 MG tablet Take 15 mg by mouth nightly      hydrOXYzine (ATARAX) 25 MG tablet Take 25 mg by mouth daily      polyethylene glycol (GLYCOLAX) 17 g packet Take 17 g by mouth daily as needed for Constipation      albuterol (PROVENTIL) (2.5 MG/3ML) 0.083% nebulizer solution Take 2.5 mg by nebulization every 6 hours as needed for Wheezing      ARIPiprazole (ABILIFY) 5 MG tablet Take 5 mg by mouth nightly      docusate sodium (COLACE) 100 MG capsule Take 100 mg by mouth 2 times daily      Glucagon, rDNA, (GLUCAGON EMERGENCY IJ) Inject as directed      glucose (GLUTOSE) 40 % GEL Take 15 g by mouth See Admin Instructions      hydrALAZINE (APRESOLINE) 10 MG tablet Take 1 tablet by mouth 2 times daily  Qty: 60 tablet, Refills: 1      metoprolol tartrate (LOPRESSOR) 25 MG tablet Take 1 tablet by mouth daily  Qty: 60 tablet, Refills: 0      pantoprazole (PROTONIX) 40 MG tablet Take 1 tablet by mouth every morning (before breakfast)  Qty: 30 tablet, Refills: 3      levothyroxine (SYNTHROID) 75 MCG tablet TAKE 1 TABLET BY MOUTH IN THE MORNING BEFORE BREAKFAST  Qty: 60 tablet, Refills: 0      epoetin nena-epbx (RETACRIT) 3000 UNIT/ML SOLN injection Inject 1 mL into the skin three times a week  Qty: 21.9 mL      rOPINIRole (REQUIP) 0.25 MG tablet Take 0.25 mg by mouth nightly      !! blood glucose test strips (CONTOUR NEXT TEST) strip 1 each by In Vitro route 5 times daily As needed. Qty: 150 each, Refills: 5    Associated Diagnoses: Type 1 diabetes mellitus with other specified complication ([facility] [address] Insulin pump in place      metoclopramide (REGLAN) 5 MG tablet Take 5 mg by mouth 3 times daily        ! ! - Potential duplicate medications found. Please discuss with provider. STOP taking these medications       insulin glargine (LANTUS SOLOSTAR) 100 UNIT/ML injection pen Comments:   Reason for Stopping:             Note that more than 30 minutes was spent in preparing discharge papers, discussing discharge with patient, medication review, etc.    NOTE: This report was transcribed using voice recognition software. Every effort was made to ensure accuracy; however, inadvertent computerized transcription errors may be present.      Signed:  Electronically signed by Yulisa Correa MD on 7/5/2021 at 8:08 PM

## 2024-12-05 NOTE — PROGRESS NOTES
[address] [place], [place] [address]ist   Progress Note    Admitting Date and Time: 2/4/2020  1:35 AM  Admit Dx: Altered mental status [R41.82]    Subjective:    Patient complains of abdominal pain that began this morning. She said that her edema is improving, but she still does not feel good overall. ROS: denies fever, chills, cp, sob, n/v, HA unless stated above.      vancomycin  1,000 mg Intravenous Once in dialysis    insulin lispro  0-12 Units Subcutaneous TID WC    insulin lispro  0-6 Units Subcutaneous Nightly    insulin glargine  14 Units Subcutaneous BID    doxycycline hyclate  100 mg Oral 2 times per day    lidocaine  20 mL Intradermal Once    mupirocin   Topical BID    bumetanide  2 mg Intravenous BID    ciprofloxacin  250 mg Oral Daily    vitamin D  50,000 Units Oral Weekly    epoetin nena-epbx  8,000 Units Subcutaneous Once per day on Mon Wed Fri    sodium chloride flush  10 mL Intravenous 2 times per day    heparin (porcine)  5,000 Units Subcutaneous 3 times per day    amitriptyline  10 mg Oral Nightly    aspirin  81 mg Oral Daily    atorvastatin  40 mg Oral Nightly    cloNIDine  0.3 mg Oral TID    diltiazem  240 mg Oral Daily    hydrALAZINE  100 mg Oral 3 times per day    metoprolol  100 mg Oral BID     oxyCODONE-acetaminophen, 1 tablet, Q4H PRN  LORazepam, 0.5 mg, BID PRN  glucose, 15 g, PRN  dextrose, 12.5 g, PRN  glucagon (rDNA), 1 mg, PRN  dextrose, 100 mL/hr, PRN  sodium chloride flush, 10 mL, PRN  magnesium hydroxide, 30 mL, Daily PRN  ondansetron, 4 mg, Q6H PRN  acetaminophen, 650 mg, Q4H PRN  hydrALAZINE, 10 mg, Q4H PRN  labetalol, 10 mg, Q4H PRN  traMADol, 50 mg, Q6H PRN         Objective:    /69   Pulse 80   Temp 98.1 °F (36.7 °C) (Oral)   Resp 16   Ht 5' 4\" (1.626 m)   Wt 170 lb 1.6 oz (77.2 kg)   LMP 11/16/2019 (LMP Unknown)   SpO2 98%   BMI 29.20 kg/m²   General Appearance: alert and oriented to person, place and time and in no acute distress  Skin: warm and dry  Head: normocephalic and atraumatic  Eyes: pupils equal, round, and reactive to light, extraocular eye movements intact, conjunctivae normal  Neck: neck supple and non tender without mass   Pulmonary/Chest: diminished bilaterally- no wheezes, rales or rhonchi, no respiratory distress, RTDC  Cardiovascular: normal rate, normal S1 and S2   Abdomen: soft, non-tender, non-distended, normal bowel sounds, no masses or organomegaly  Extremities: no cyanosis, no clubbing and 2+ bilateral lower extremity edema, right hand dressing  Neurologic: no cranial nerve deficit and speech normal      Recent Labs     02/12/20  0530 02/13/20  0630 02/14/20  0545    134 144   K 4.3 4.5 4.1    94* 106   CO2 26 22 30*   BUN 62* 44* 27*   CREATININE 3.9* 3.3* 2.6*   GLUCOSE 203* 574* 40*   CALCIUM 8.1* 8.1* 8.0*       Recent Labs     02/12/20  0530 02/13/20  0630 02/14/20  0545   WBC 6.2 8.9 8.3   RBC 2.85* 2.80* 2.85*   HGB 8.4* 8.3* 8.4*   HCT 27.2* 27.7* 27.4*   MCV 95.4 98.9 96.1   MCH 29.5 29.6 29.5   MCHC 30.9* 30.0* 30.7*   RDW 15.9* 16.5* 16.5*    357 363   MPV 10.1 9.9 9.3         Radiology:   IR FLUORO GUIDED CVA DEVICE PLMT/REPLACE/REMOVAL   Final Result   Technically successful ultrasound and fluoroscopic guided   placement of right internal jugular tunneled dialysis catheter. IR ULTRASOUND GUIDANCE VASCULAR ACCESS   Final Result      XR HAND RIGHT (MIN 3 VIEWS)   Final Result   No acute findings. US DUP UPPER EXTREMITY RIGHT VENOUS   Final Result   1. No evidence of acute venous thrombosis right upper extremity. US DUP LOWER EXTREMITIES BILATERAL VENOUS   Final Result   No evidence of bilateral lower extremity deep venous   thrombus from common femoral vein to proximal calf. CT Head WO Contrast   Final Result   1. No acute intracranial abnormality, without significant change in   appearance of intracranial contents since 1/22/2020.   2.  Sphenoid, bilateral ethmoid, positive for Gemella mobillorum on 2/4/20. ID following. Repeat urine sent on 2/8/20 with Klebsiella pneumoniae. Received Vancomycin x 1 dose on 2/8 and again on 2/10. On oral Cipro. 9. Bilateral lower extremity edema:  Ultrasound negative for DVT  10. Right hand edema:  Likely cellulitis. Continue oral Keflex. Orthopedic Surgery following. I&D bedside on 2/11/20. IV Vancomycin ordered again for today. On doxycycline. Aspirate growing Staphylococcus aueus. 11. Vitamin D deficiency:  Continue Vitamin D. 12. Anemia:  Continue MARIA TERESA  13. Hyperuricemia:  Defer treatment to Nephrology as NSAIDs and colchicine cannot be used due to CKD. 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 15. Referral to LTAC. NOTE: This report was transcribed using voice recognition software.  Every effort was made to ensure accuracy; however, inadvertent computerized transcription errors may be present.     Electronically signed by HEBER Scott CNP on 2/14/2020 at 10:47 AM Fair (50-75%)

## 2025-04-07 NOTE — PROGRESS NOTES
Date last seen: 2/10/2025   Date of next visit:   Future Appointments          APR 9 2025   12:45 PM - Medicare wellness visit  [facility] - Jhoana Floyd MD           APR 10    2025   12:15 PM - Initial Therapy Visit   [facility] [facility] - Rukhsana Springer, PT           APR 14 2025   02:00 PM - Follow-Up Therapy  [facility] - GANESH Snyder           APR 16 2025   10:00 AM - Follow-Up Therapy  [facility]-Sh[facility] Alcaraz, PT           APR 23 2025   10:00 AM - Follow-Up Therapy  [facility] Alcaraz, PT              Displaying the next 5 appointments. This patient has additional appointments scheduled.              Medication Requested:     Outpatient Current Medications as of as of 4/6/2025         Disp Refills Start End    amLODIPine (NORVASC) 10 MG tablet 90 tablet 1 10/9/2024 --    Sig - Route: Take 1 tablet by mouth daily. - Oral    Class: Eprescribe            BP Readings from Last 1 Encounters:   04/02/25 120/70       Potassium   Lab Results   Component Value Date    POTASSIUM 4.0 02/12/2025     Kidney function   Lab Results   Component Value Date    GFRESTIMATE >90 02/12/2025    MALBCR 103.1 (H) 05/16/2024     LFTs   Lab Results   Component Value Date    AST 12 02/10/2025    GPT 17 02/10/2025    ALKPT 115 02/10/2025        Message left for Dr. Melvi Delong regarding medication  Question upon discharge.  Will await call back

## 2025-05-23 NOTE — H&P
said at last visit you told patient you could refer her to have her lap band taken out.    She continues to get sick and would like a referral to have lap band taken out.   [address] [place], [place] [address]ist Group   HISTORY AND PHYSICAL EXAM      AUTHOR: Fortunato Berg MD PATIENT NAME: Mirna Le   DATE: 2021 MRN: [id number], : 1990   Primary Care Physician: Theo Godwin MD     CHIEF COMPLAINT / REASON FOR ADMISSION:  Generalized weakness, Generalized pain, SOB, Nausea, Uncontrolled blood sugar    HPI:   This is a 36 y.o. female  has a past medical history of ESRD, Diabetes mellitus, Hyperosmolar hyperglycemic state presented with Generalized weakness, Generalized pain, uncontrolled blood sugar for last few days prior to arrival to the hospital.  She has been reading high on her machine approximately 600 she states for the past several days since Saturday. She also complains of having some nausea generalized fatigue and arthralgias as well as shortness of breath whenever she stands. Associated with some dysuria. The patient was seen and examined at bedside, appears alert and awake with no acute distress and is able to answer simple  questions. On direct questioning, patient denied any resting ongoing chest pain, resting SOB, hemoptysis, productive cough, fever, ongoing palpitation, active abdominal pain, hematemesis, rectal bleeding, diana, hematuria, any other  and GI complaints and any new focal neuro deficits.   ROS:  Pertinent positives and negatives are noted in the HPI, all other systems are reviewed and negative    PMH:  Past Medical History:   Diagnosis Date    Acute congestive heart failure ([address]     BC (acute kidney injury) ([address] 10/1/2019    Cardiac arrest ([address] 2/15/2021    Cephalgia 10/9/2019    Chronic kidney disease     Depression     Diabetes mellitus ([address]     Diabetic gastroparesis associated with type 1 diabetes mellitus ([address] 2018    Diabetic ketoacidosis ([address] 2011    Diabetic ketoacidosis with coma associated with type 1 diabetes mellitus ([address] 2013    Diabetic polyneuropathy associated with type 1 diabetes mellitus ([facility] 2020    Drug use complicating pregnancy in third trimester     Endocarditis 10/31/2020    ESRD (end stage renal disease) ([facility] 2020    Hemodialysis patient ([facility]     History of blood transfusion 2019    Hyperlipidemia 10/8/2020    Hyperosmolar hyperglycemic state (HHS) ([facility] 2020    Hypothyroidism 10/8/2020    Iron deficiency anemia 10/1/2019    MDRO (multiple drug resistant organisms) resistance     MRSA (methicillin resistant Staphylococcus aureus)     back wound abcess    Non compliance w medication regimen 3/30/2016    Other disorders of kidney and ureter     Pregnancy 3/30/2016    16 weeks    Previous  delivery affecting pregnancy, antepartum 3/2/2017    Previous stillbirth or demise, antepartum 2016    [facility] 2020    Severe pre-eclampsia in third trimester 2016    Shock liver 2/15/2021    Valvular endocarditis 11/10/2020    This Diagnosis was added to the Problem List based on transcribed orders from Dr. Key Garcia          Surgical History:  Past Surgical History:   Procedure Laterality Date    BACK SURGERY      abscess     SECTION      x2    CHOLECYSTECTOMY, LAPAROSCOPIC N/A 2019    CHOLECYSTECTOMY LAPAROSCOPIC performed by Kimberly Resendez MD at [facility] 2018    COLONOSCOPY WITH BIOPSY performed by Alexus Melgoza MD at [facility] N/A 2018    COLONOSCOPY WITH BIOPSY performed by Srikanth Reyes MD at [address] [place] [address] ECHO COMPL [address]  2012    EF 57%    ECHO COMPL W DOP COLOR FLOW  6/10/2013         EMBOLECTOMY N/A 2020    ANGIOVAC, VEGECTOMY, YULISSA -- REQS ROOM 3 performed by Shane Bowden MD at [facility] 2021    LEFT LEG INCISION AND DRAINAGE, DEBRIDEMENT, WOUND VAC APPLICATION performed by Virginia Arriola DPM at [facility] 2021    LEFT LEG DEBRIDEMENT BIOPSY POSS APPLICATION WOUND VAC performed by Fidelina Avila DPM at [facility] 20 N/A 6/26/2020    LAPAROSCOPIC INSERTION PERITONEAL DIALYSIS CATHETER performed by Lubna Hatch MD at [facility] ESOPHAGOGASTRODUODENOSCOPY TRANSORAL DIAGNOSTIC N/A 5/30/2018    EGD ESOPHAGOGASTRODUODENOSCOPY performed by Shelley Childs MD at [address] [facility] TRANSESOPHAGEAL ECHOCARDIOGRAM N/A 10/19/2020    TRANSESOPHAGEAL ECHOCARDIOGRAM WITH BUBBLE STUDY performed by Jovita De La Vega MD at [address] [facility] TUNNELED VENOUS PORT PLACEMENT  04/2018    UPPER GASTROINTESTINAL ENDOSCOPY  12/18/2018    EGD BIOPSY performed by Krishna Ferraro MD at [facility] N/A 10/11/2019    EGD ESOPHAGOGASTRODUODENOSCOPY performed by Emy Herbert DO at [place] [address]       Medications Prior to Admission:    Prior to Admission medications    Medication Sig Start Date End Date Taking? Authorizing Provider   insulin lispro, 1 Unit Dial, (HUMALOG KWIKPEN) 100 UNIT/ML SOPN Inject 3 units with meals + sliding scale. MAX 30U/day 6/1/21   Nicola Dillon MD   insulin glargine (LANTUS SOLOSTAR) 100 UNIT/ML injection pen Inject 5 Units into the skin 2 times daily 6/1/21   Nicola Dillon MD   Insulin Pen Needle (BD PEN NEEDLE NAV U/F) 32G X 4 MM MISC Uses with insulin 4 times a day 6/1/21   Nicola Dillon MD   blood glucose monitor strips Freestyle Lite Strips. Checks 4 times/day before meals and at bedtime and as needed for symptoms of irregular blood glucose. 6/1/21   Zeke Hopper MD   mupirocin (BACTROBAN) 2 % ointment Apply 15 g topically daily Apply topically daily to left leg.     Historical Provider, MD   folic acid (FOLVITE) 1 MG tablet Take 1 tablet by mouth daily 5/19/21 6/18/21  Shravan Concepcion MD   mirtazapine (REMERON) 15 MG tablet Take 15 mg by mouth nightly    Historical Provider, MD   hydrOXYzine (ATARAX) 25 MG tablet Take 25 mg by mouth daily    Historical Provider, MD   polyethylene glycol (GLYCOLAX) 17 g packet Take 17 g by mouth daily as needed for Constipation    Historical Provider, MD   albuterol (PROVENTIL) (2.5 MG/3ML) 0.083% nebulizer solution Take 2.5 mg by nebulization every 6 hours as needed for Wheezing    Historical Provider, MD   ARIPiprazole (ABILIFY) 5 MG tablet Take 5 mg by mouth nightly 4/18/21   Historical Provider, MD   docusate sodium (COLACE) 100 MG capsule Take 100 mg by mouth 2 times daily    Historical Provider, MD   Glucagon rDNA, (GLUCAGON EMERGENCY IJ) Inject as directed    Historical Provider, MD   glucose (GLUTOSE) 40 % GEL Take 15 g by mouth See Admin Instructions    Historical Provider, MD   sevelamer (RENVELA) 800 MG tablet Take by mouth 1600 mg three times daily with meals and 800 mg at bedtime 4/18/21   Historical Provider, MD   hydrALAZINE (APRESOLINE) 10 MG tablet Take 1 tablet by mouth 2 times daily 4/19/21   Jacindayael Littlejohn, DO   metoprolol tartrate (LOPRESSOR) 25 MG tablet Take 1 tablet by mouth daily 4/19/21   Jacindayael Littlejohn, DO   pantoprazole (PROTONIX) 40 MG tablet Take 1 tablet by mouth every morning (before breakfast) 4/19/21   Jacindayael Littlejohn, DO   levothyroxine (SYNTHROID) 75 MCG tablet TAKE 1 TABLET BY MOUTH IN THE MORNING BEFORE BREAKFAST 4/19/21   Jacinda Littlejohn, DO   epoetin nena-epbx (RETACRIT) 3000 UNIT/ML SOLN injection Inject 1 mL into the skin three times a week 3/1/21   Antwon Canchola MD   rOPINIRole (REQUIP) 0.25 MG tablet Take 0.25 mg by mouth nightly    Historical Provider, MD   blood glucose test strips (CONTOUR NEXT TEST) strip 1 each by In Vitro route 5 times daily As needed. 12/14/20   Zeke Lay MD   metoclopramide (REGLAN) 5 MG tablet Take 5 mg by mouth 3 times daily     Historical Provider, MD       Allergies:    Cefepime and Toradol [ketorolac tromethamine]    Social History:    reports that she quit smoking about 4 months ago. Her smoking use included cigarettes.  She has a 3.00 pack-year smoking history. She has never used smokeless tobacco. She reports that she does not drink alcohol and does not use drugs. Family History:   family history includes Asthma in her brother and mother; Diabetes in her mother; High Blood Pressure in her father and mother; Hypertension in her mother. PHYSICAL EXAM:  Vitals:  /89   Pulse 99   Temp 98.1 °F (36.7 °C) (Oral)   Resp 10   Ht 5' 4\" (1.626 m)   Wt 138 lb 3.7 oz (62.7 kg)   LMP 02/21/2021   SpO2 100%   BMI 23.73 kg/m²   GENERAL: No acute distress, Alert and awake, Afebrile, Appears tired and weak otherwise hemodynamically stable at present. HEENT: PERRLA, no icterus. OP clear and no exudates. NECK: Supple  no carotid/ophthalmic bruits, JVD None. RESPIRATORY:  Bilateral equal vesicular breath sound with no wheezing. Lung bases are clear. HEART: No tachycardia at bedside and regular rhythm. Normal S1 and S2, No S3 or S4 is audible. No pulsation, thrills, murmur or friction rubs. ABDOMEN: Soft, nondistended, nontender. No hepatomegaly or splenomegaly. No CVA tenderness on the both sides. Bowel sound is present  EXTREMITIES: All peripheral pulses are present. No calf tenderness or swelling. No pedal edema is present. [facility] NEUROLOGY: Alert and awake. No new focal neuro deficit. Bilateral Pupil is equal and reactive to light. CN-ii-xii otherwise grossly intact. Motor and Sensory: Grossly Intact bilaterally with no new focal signs     LABS:  Recent Labs     06/21/21  2321   WBC 11.4   RBC 3.16*   HGB 9.6*   HCT 31.7*   .3*   MCH 30.4   MCHC 30.3*   RDW 14.2      MPV 10.0     Recent Labs     06/21/21  2321 06/22/21  0345   * 127*   K 5.6* 4.0   CL 83* 88*   CO2 19* 23   BUN 38* 37*   CREATININE 7.9* 7.9*   GLUCOSE 874* 739*   CALCIUM 8.7 7.9*     No results for input(s): POCGLU in the last 72 hours.   Results for orders placed or performed during the hospital encounter of 06/21/21   CBC Auto Differential   Result Value Ref Range    WBC 11.4 4.5 - 11.5 E9/L    RBC 3.16 (L) 3.50 - 5.50 E12/L    Hemoglobin 9.6 (L) 11.5 - 15.5 g/dL    Hematocrit 31.7 (L) 34.0 - 48.0 %    .3 (H) 80.0 - 99.9 fL    MCH 30.4 26.0 - 35.0 pg    MCHC 30.3 (L) 32.0 - 34.5 %    RDW 14.2 11.5 - 15.0 fL    Platelets 414 326 - 534 E9/L    MPV 10.0 7.0 - 12.0 fL    Neutrophils % 75.0 43.0 - 80.0 %    Immature Granulocytes % 0.6 0.0 - 5.0 %    Lymphocytes % 19.7 (L) 20.0 - 42.0 %    Monocytes % 3.1 2.0 - 12.0 %    Eosinophils % 0.8 0.0 - 6.0 %    Basophils % 0.8 0.0 - 2.0 %    Neutrophils Absolute 8.52 (H) 1.80 - 7.30 E9/L    Immature Granulocytes # 0.07 E9/L    Lymphocytes Absolute 2.24 1.50 - 4.00 E9/L    Monocytes Absolute 0.35 0.10 - 0.95 E9/L    Eosinophils Absolute 0.09 0.05 - 0.50 E9/L    Basophils Absolute 0.09 0.00 - 0.20 E9/L   Comprehensive Metabolic Panel w/ Reflex to MG   Result Value Ref Range    Sodium 122 (L) 132 - 146 mmol/L    Potassium reflex Magnesium 5.6 (H) 3.5 - 5.0 mmol/L    Chloride 83 (L) 98 - 107 mmol/L    CO2 19 (L) 22 - 29 mmol/L    Anion Gap 20 (H) 7 - 16 mmol/L    Glucose 874 (HH) 74 - 99 mg/dL    BUN 38 (H) 6 - 20 mg/dL    CREATININE 7.9 (H) 0.5 - 1.0 mg/dL    GFR Non-African American 7 >=60 mL/min/1.73    GFR African American 7     Calcium 8.7 8.6 - 10.2 mg/dL    Total Protein 6.6 6.4 - 8.3 g/dL    Albumin 2.9 (L) 3.5 - 5.2 g/dL    Total Bilirubin 0.3 0.0 - 1.2 mg/dL    Alkaline Phosphatase 222 (H) 35 - 104 U/L    ALT 23 0 - 32 U/L    AST 26 0 - 31 U/L   PH, VENOUS   Result Value Ref Range    pH, Khurram 7.45 7.35 - 7.45   Beta-Hydroxybutyrate   Result Value Ref Range    Beta-Hydroxybutyrate 3.94 (H) 0.02 - 0.27 mmol/L   Urinalysis, reflex to microscopic   Result Value Ref Range    Color, UA Yellow Straw/Yellow    Clarity, UA CLOUDY (A) Clear    Glucose, Ur >=1000 (A) Negative mg/dL    Bilirubin Urine Negative Negative    Ketones, Urine Negative Negative mg/dL    Specific Gravity, UA 1.010 1.005 - 1.030    Blood, Urine LARGE (A) Negative    pH, UA 7.5 5.0 - 9.0    Protein, UA >=300 (A) Negative mg/dL    Urobilinogen, Urine 0.2 <2.0 E.U./dL    Nitrite, Urine Negative Negative    Leukocyte Esterase, Urine Negative Negative   Troponin   Result Value Ref Range    Troponin, High Sensitivity 251 (H) 0 - 9 ng/L   Lactate, Sepsis   Result Value Ref Range    Lactic Acid, Sepsis 2.2 (H) 0.5 - 1.9 mmol/L   Lactate, Sepsis   Result Value Ref Range    Lactic Acid, Sepsis 1.8 0.5 - 1.9 mmol/L   Troponin   Result Value Ref Range    Troponin, High Sensitivity 253 (H) 0 - 9 ng/L   Microscopic Urinalysis   Result Value Ref Range    WBC, UA PACKED (A) 0 - 5 /HPF    RBC, UA 1-3 0 - 2 /HPF    Bacteria, UA MANY (A) None Seen /HPF   Basic Metabolic Panel w/ Reflex to MG   Result Value Ref Range    Sodium 127 (L) 132 - 146 mmol/L    Potassium reflex Magnesium 4.0 3.5 - 5.0 mmol/L    Chloride 88 (L) 98 - 107 mmol/L    CO2 23 22 - 29 mmol/L    Anion Gap 16 7 - 16 mmol/L    Glucose 739 (HH) 74 - 99 mg/dL    BUN 37 (H) 6 - 20 mg/dL    CREATININE 7.9 (H) 0.5 - 1.0 mg/dL    GFR Non-African American 7 >=60 mL/min/1.73    GFR African American 7     Calcium 7.9 (L) 8.6 - 10.2 mg/dL   POCT Glucose   Result Value Ref Range    Meter Glucose >500 (H) 74 - 99 mg/dL   POC Pregnancy Urine   Result Value Ref Range    HCG, Urine, POC Negative Negative    Lot Number [id number]     Positive QC Pass/Fail Pass     Negative QC Pass/Fail Pass    POCT Glucose   Result Value Ref Range    Meter Glucose >500 (H) 74 - 99 mg/dL   POCT Glucose   Result Value Ref Range    Meter Glucose >500 (H) 74 - 99 mg/dL   POCT Glucose   Result Value Ref Range    Meter Glucose 493 (H) 74 - 99 mg/dL   EKG 12 Lead   Result Value Ref Range    Ventricular Rate 109 BPM    Atrial Rate 109 BPM    P-R Interval 114 ms    QRS Duration 90 ms    Q-T Interval 366 ms    QTc Calculation (Bazett) 492 ms    P Axis 58 degrees    R Axis 47 degrees    T Axis 49 degrees     ED Course as of Jun 22 0559 Tue Jun 22, 2021   2612 Patient accepted into the ICU by nurse practitioner Caldwell Buerger    [CB]      ED Course User Index  [CB] Angelic Kinney MD     Radiology: XR CHEST PORTABLE    Result Date: 6/21/2021  EXAMINATION: ONE XRAY VIEW OF THE CHEST 6/21/2021 10:49 pm COMPARISON: 05/14/2021 HISTORY: ORDERING SYSTEM PROVIDED HISTORY: sob, hyperglycemia TECHNOLOGIST PROVIDED HISTORY: Reason for exam:->sob, hyperglycemia FINDINGS: The lungs are without acute focal process. There is no effusion or pneumothorax. The cardiomediastinal silhouette is without acute process. The osseous structures are without acute process. No acute process. ASSESSMENT:    Present on Admission:   DKA, type 1, not at goal [facility])   Hyperosmolality    PLAN:  # Hyperosmolar state with Hyperglycemia - most likely due to medication noncompliance vs concurrent stress  On admission blood sugar is >500  AG  high, Ph is WNL  Hemodynamically stable  Serum BUN/Creatinine = elevated  S/p NS Bolus in ED and continue agressive IV hydration  Started with IV insulin + NS + @3 hourly BMP and Hourly sugar check  Will bridge to long active insulin once blood sugar is controlled and will start diabetic diet accordingly  Will monitor K and replace accordingly. # ESRD on PD   Renal consult   Continue phos binders  # Hypertension   Currently better controlled  Will resume home medications as needed. Monitor vitals and adjust BP meds as needed  # Elevated Troponin   No active chest pain and No Specific EKG changes suggestive for ACS   Might be due to demand ischemia and vs CKD   Antipletelets + Statin on board   Will trend troponin   Telemetry monitor  # UTI with Genralized Weakness    Has lower abdominal pain    UA suggestive on uti as in chart.     Started with IV Ceftriaxone   F/u urine culture and adjust/de-escalate antibiotics and Plan for ID consult    # Rest of the chronic medical problems are stable and will be managed with appropriately with home medications, placed nursing communication order to verify home medications before giving them to the patient. # Diet: On PO Diet  # IVF's: No  # Fall Precaution: Yes  # Disposition: Home/primary residence   # Code Status: Full code by default  # DVT Prophylaxis : SC Heparin or SC Lovenox as on chart  The patient at bedside was counseled about clinical status, laboratory/imaging results, diagnoses, medication side effects, risk, and treatment plan, all questions were answered to patient's satisfaction and verbalized understanding      SIGNATURE: Lamont Kim MD PATIENT NAME: [address] [place] [address] Drive #: Hospitalist on call MRN: [id number]     Disclaimer: Portions of this note may have been generated using Dragon voice recognition software. Reasonable efforts were made to correct any dictation errors that resulted due to the programming of this software but some may still be present.

## (undated) DEVICE — DRAPE SHEET: Brand: UNBRANDED

## (undated) DEVICE — ALCOHOL 70% RUBBING 16OZ

## (undated) DEVICE — DRESSING TRNSPAR W5XL4.5IN FLM SHT SEMIPERMEABLE WIND

## (undated) DEVICE — ANTISEPTIC 16OZ H PEROX 1ST AID ORAL DEBRIDING AGNT

## (undated) DEVICE — GOWN ISOLATN REG YEL M WT MULTIPLY SIDETIE LEV 2

## (undated) DEVICE — STAY.SAFE® CATHETER EXTENSION SET WITH LUER-LOCK, 12 INCH: Brand: STAY.SAFE®

## (undated) DEVICE — COVER,LIGHT HANDLE,FLX,1/PK: Brand: MEDLINE INDUSTRIES, INC.

## (undated) DEVICE — YANKAUER,BULB TIP,W/O VENT,RIGID,STERILE: Brand: MEDLINE

## (undated) DEVICE — 3M™ STERI-DRAPE™ CARDIOVASCULAR SHEET WITH IOBAN™ 2 INCISE FILM 6677: Brand: STERI-DRAPE™ IOBAN™ 2

## (undated) DEVICE — DRESSING GZ W1XL8IN COT XRFRM N ADH OVERWRAP CURAD

## (undated) DEVICE — GOWN,SIRUS,NONRNF,SETINSLV,XL,20/CS: Brand: MEDLINE

## (undated) DEVICE — MEDI-VAC NON-CONDUCTIVE SUCTION TUBING: Brand: CARDINAL HEALTH

## (undated) DEVICE — GAUZE,SPONGE,4"X4",16PLY,XRAY,STRL,LF: Brand: MEDLINE

## (undated) DEVICE — MEDI-VAC YANKAUER SUCTION HANDLE W/BULBOUS TIP: Brand: CARDINAL HEALTH

## (undated) DEVICE — GLOVE SURG SZ 65 THK91MIL LTX FREE SYN POLYISOPRENE

## (undated) DEVICE — KENDALL 450 SERIES MONITORING FOAM ELECTRODE - RECTANGULAR SHAPE ( 3/PK): Brand: KENDALL

## (undated) DEVICE — KIT BEDSIDE REVITAL OX 500ML

## (undated) DEVICE — FORCEPS BX L240CM JAW DIA2.8MM L CAP W/ NDL MIC MESH TOOTH

## (undated) DEVICE — LUBRICANT SURG JELLY ST BACTER TUBE 4.25OZ

## (undated) DEVICE — TOWEL,OR,DSP,ST,BLUE,STD,6/PK,12PK/CS: Brand: MEDLINE

## (undated) DEVICE — SHEATH INTRO 26FR L33CM OD9.5MM ID8.7MM HYDRPHLC KINK

## (undated) DEVICE — INSUFFLATION NEEDLE TO ESTABLISH PNEUMOPERITONEUM.: Brand: INSUFFLATION NEEDLE

## (undated) DEVICE — CAMERA STRYKER 1488 HD GEN

## (undated) DEVICE — NEEDLE HYPO 25GA L1.5IN BLU POLYPR HUB S STL REG BVL STR

## (undated) DEVICE — Device: Brand: MEDEX

## (undated) DEVICE — CONTAINER SPEC COLL 960ML POLYPR TRIANG GRAD INTAKE/OUTPUT

## (undated) DEVICE — CIRCUIT VEN DRNGE W/ BUB TRAP EC BYPS PROC ANGIOVAC

## (undated) DEVICE — Device: Brand: DEFENDO VALVE AND CONNECTOR KIT

## (undated) DEVICE — SYRINGE MED 10ML LUERLOCK TIP W/O SFTY DISP

## (undated) DEVICE — SET ENDO INSTR LAPAROSCOPIC STGENLAP

## (undated) DEVICE — TROCAR: Brand: KII SLEEVE

## (undated) DEVICE — 6 X 9  1.75MIL 4-WALL LABGUARD: Brand: MINIGRIP COMMERCIAL LLC

## (undated) DEVICE — CLOTH SURG PREP PREOPERATIVE CHLORHEXIDINE GLUC 2% READYPREP

## (undated) DEVICE — SWAB SPEC COLL SHFT L5.25IN POLYUR FOAM TIP SFT DBL MEDIA

## (undated) DEVICE — NDL CNTR 40CT FM MAG: Brand: MEDLINE INDUSTRIES, INC.

## (undated) DEVICE — DRESSING WND VAC MED GRANUFOAM SENSATRAC

## (undated) DEVICE — DOUBLE BASIN SET: Brand: MEDLINE INDUSTRIES, INC.

## (undated) DEVICE — MASK,FACE,MAXFLUIDPROTECT,SHIELD/ERLPS: Brand: MEDLINE

## (undated) DEVICE — SPONGE GZ W4XL4IN RAYON POLY FILL CVR W/ NONWOVEN FAB

## (undated) DEVICE — PATIENT RETURN ELECTRODE, SINGLE-USE, CONTACT QUALITY MONITORING, ADULT, WITH 9FT CORD, FOR PATIENTS WEIGING OVER 33LBS. (15KG): Brand: MEGADYNE

## (undated) DEVICE — GLOVE ORANGE PI 8   MSG9080

## (undated) DEVICE — PENCIL ES L3M BTTN SWCH HOLSTER W/ BLDE ELECTRD EDGE

## (undated) DEVICE — INTENDED FOR TISSUE SEPARATION, AND OTHER PROCEDURES THAT REQUIRE A SHARP SURGICAL BLADE TO PUNCTURE OR CUT.: Brand: BARD-PARKER ® STAINLESS STEEL BLADES

## (undated) DEVICE — BLOCK BITE 60FR CAREGUARD

## (undated) DEVICE — GLOVE SURG SZ 7.5 L11.73IN FNGR THK9.8MIL STRW LTX POLYMER

## (undated) DEVICE — Device

## (undated) DEVICE — GOWN,SIRUS,FABRNF,L,20/CS: Brand: MEDLINE

## (undated) DEVICE — SPONGE GZ 4IN 4IN 4 PLY N WVN AVANT

## (undated) DEVICE — GLOVE ORTHO 7   MSG9470

## (undated) DEVICE — GLOVE ORANGE PI 7 1/2   MSG9075

## (undated) DEVICE — CANNULA IV 18GA L15IN BLNT FILL LUERLOCK HUB MJCT

## (undated) DEVICE — GRADUATE

## (undated) DEVICE — APPLICATOR PREP 26ML 0.7% IOD POVACRYLEX 74% ISO ALC ST

## (undated) DEVICE — MARKER,SKIN,WI/RULER AND LABELS: Brand: MEDLINE

## (undated) DEVICE — BANDAGE,GAUZE,4.5"X4.1YD,STERILE,LF: Brand: MEDLINE

## (undated) DEVICE — GLOVE ORANGE PI 7   MSG9070

## (undated) DEVICE — SPONGE LAP W18XL18IN WHT COT 4 PLY FLD STRUNG RADPQ DISP ST

## (undated) DEVICE — COVER PRB W14XL147CM TELESCOPICALLY FLD EXT LEN CIV-FLEX

## (undated) DEVICE — SET MAJOR INSTR ORTHO

## (undated) DEVICE — TUBING L10FT 3/8X3/32IN ST PRE CUT MED GRD

## (undated) DEVICE — SYRINGE MED 3ML CLR PLAS STD N CTRL LUERLOCK TIP DISP

## (undated) DEVICE — MIC* GASTRIC-JEJUNAL FEEDING TUBE KIT - SURGICAL PLACEMENT - 18 FR: Brand: MIC* GASTRIC - JEJUNAL FEEDING TUBE KIT

## (undated) DEVICE — INTRO SHEATH W/6.0FRX10CMX.0385IN

## (undated) DEVICE — SOLUTION IV 1000ML 0.9% SOD CHL PH 5 INJ USP VIAFLX PLAS

## (undated) DEVICE — PMI PTFE COATED LAPAROSCOPIC WIRE L-HOOK 33 CM: Brand: PMI

## (undated) DEVICE — SET SURG INSTR PODI

## (undated) DEVICE — SYRINGE 20ML LL S/C 50

## (undated) DEVICE — SPECIMEN CUP W/LID: Brand: DEROYAL

## (undated) DEVICE — SET SURG INSTR DISSECT

## (undated) DEVICE — Z DISCONTINUED NO SUB IDED TUBING ETCO2 AD L6.5FT NSL ORAL CVD PRNG NONFLARED TIP OVR

## (undated) DEVICE — TROCAR: Brand: KII FIOS FIRST ENTRY

## (undated) DEVICE — LIMB HOLDER, WRIST/ANKLE: Brand: DEROYAL

## (undated) DEVICE — SYRINGE IRRIG 60ML SFT PLIABLE BLB EZ TO GRP 1 HND USE W/

## (undated) DEVICE — GAUZE,SPONGE,4"X4",16PLY,STRL,LF,10/TRAY: Brand: MEDLINE

## (undated) DEVICE — CATHETER PERI DLYS AD 15FR L47CM UNIV DBL CUF LIN STR RADPQ

## (undated) DEVICE — TROCAR: Brand: KII® SLEEVE

## (undated) DEVICE — STANDARD HYPODERMIC NEEDLE,POLYPROPYLENE HUB: Brand: MONOJECT

## (undated) DEVICE — Z INACTIVE USE 2660664 SOLUTION IRRIG 3000ML 0.9% SOD CHL USP UROMATIC PLAS CONT

## (undated) DEVICE — DRAPE THER FLUID WARMING 66X44 IN FLAT SLUSH DBL DISC ORS

## (undated) DEVICE — KIT DIL 8/12/16/20/24FR NDL 18GA GWIRE L180CM DIA0.035IN

## (undated) DEVICE — PUMP SUC IRR TBNG L10FT W/ HNDPC ASSEMB STRYKEFLOW 2

## (undated) DEVICE — GUIDEWIRE VASC L260CM DIA0.035IN L7CM DIA3MM J TIP PTFE S

## (undated) DEVICE — CHLORAPREP 26ML ORANGE

## (undated) DEVICE — CLEANER,CAUTERY TIP,2X2",STERILE: Brand: MEDLINE

## (undated) DEVICE — DEFENDO AIR WATER SUCTION AND BIOPSY VALVE KIT FOR  OLYMPUS: Brand: DEFENDO AIR/WATER/SUCTION AND BIOPSY VALVE

## (undated) DEVICE — SOLUTION IV IRRIG POUR BRL 0.9% SODIUM CHL 2F7124

## (undated) DEVICE — CAP BTL FOR PRE FIL PVP STAY SAFE

## (undated) DEVICE — TUBING, SUCTION, 1/4" X 10', STRAIGHT: Brand: MEDLINE

## (undated) DEVICE — STERILE LATEX POWDER FREE SURGICAL GLOVES WITH HYDROGEL COATING: Brand: PROTEXIS

## (undated) DEVICE — SET CARDIAC I

## (undated) DEVICE — RESCUE COMBO FORCEPS

## (undated) DEVICE — GEL US 20GM NONIRRITATING OVERWRAPPED FILE PCH TRNSMIT

## (undated) DEVICE — BINDER ABD SM M W12IN CIRC 30 45IN 4 PNL E CNTCT CLSR POSTOP

## (undated) DEVICE — CATHETER IV 18GA L1.16IN OD1.308MM ID0.978MM GRN VIALON

## (undated) DEVICE — SURGICAL PROCEDURE PACK BASIC

## (undated) DEVICE — APPLICATOR MEDICATED 26 CC SOLUTION HI LT ORNG CHLORAPREP

## (undated) DEVICE — 0.035" (0.89MM) X 70CM0.035" (0. J/FLEX GUIDEWIREJ/FLEX GUIDEWIRE: Brand: CARDIOVASCULAR SPRING GUIDESCARDIOVASCULAR SPRING GUIDES

## (undated) DEVICE — SYSTEM CANSTR 1000ML VAC DISP

## (undated) DEVICE — SYRINGE MED 10ML TRNSLUC BRL PLUNG BLK MRK POLYPR CTRL

## (undated) DEVICE — BANDAGE COMPR M W4INXL10YD WHT BGE VELC E MTRX HK AND LOOP

## (undated) DEVICE — PACK,LAPAROTOMY,NO GOWNS: Brand: MEDLINE

## (undated) DEVICE — STRIP,CLOSURE,WOUND,MEDI-STRIP,1/4X3: Brand: MEDLINE

## (undated) DEVICE — SYRINGE MED 10ML POLYPR LUERSLIP TIP FLAT TOP W/O SFTY DISP

## (undated) DEVICE — 18GA (1.3MM OD: 1.0MM ID) X 7CM INTRODUCER18GA X 7CM NEEDLENEEDLE: Brand: INTRODUCER NEEDLEINTRODUCER NEEDLE

## (undated) DEVICE — DRESSING,GAUZE,XEROFORM,CURAD,1"X8",ST: Brand: CURAD

## (undated) DEVICE — SET SURG BASIN MAYO REUSABLE

## (undated) DEVICE — E-Z CLEAN, NON-STICK, PTFE COATED, ELECTROSURGICAL NEEDLE ELECTRODE, MODIFIED EXTENDED INSULATION, 2.75 INCH (7 CM): Brand: MEGADYNE

## (undated) DEVICE — PERCUTANEOUS INSERTION KIT-ARTERIAL: Brand: PERCUTANEOUS INSERTION KIT-ARTERIAL

## (undated) DEVICE — APPLIER CLP M/L SHFT DIA5MM 15 LIG LIGAMAX 5

## (undated) DEVICE — LARGE BORE STOPCOCK WITH ROTATING MALE LUER LOCK

## (undated) DEVICE — SKIN AFFIX SURG ADHESIVE 72/CS 0.55ML: Brand: MEDLINE

## (undated) DEVICE — ELECTRODE PT RET AD L9FT HI MOIST COND ADH HYDRGEL CORDED

## (undated) DEVICE — SET CARDIAC II

## (undated) DEVICE — [HIGH FLOW INSUFFLATOR,  DO NOT USE IF PACKAGE IS DAMAGED,  KEEP DRY,  KEEP AWAY FROM SUNLIGHT,  PROTECT FROM HEAT AND RADIOACTIVE SOURCES.]: Brand: PNEUMOSURE

## (undated) DEVICE — PEN: MARKING STD 100/CS: Brand: MEDICAL ACTION INDUSTRIES

## (undated) DEVICE — BITEBLOCK 54FR W/ DENT RIM BLOX

## (undated) DEVICE — DRAPE C ARM UNIV W41XL74IN CLR PLAS XR VELC CLSR POLY STRP

## (undated) DEVICE — PLUMEPORT LAPAROSCOPIC SMOKE FILTRATION DEVICE: Brand: PLUMEPORT ACTIV

## (undated) DEVICE — PADDING,UNDERCAST,COTTON, 4"X4YD STERILE: Brand: MEDLINE

## (undated) DEVICE — STAPLER SKIN L39MM DIA0.53MM CRWN 5.7MM S STL FIX HD PROX

## (undated) DEVICE — RADIFOCUS GLIDECATH: Brand: GLIDECATH

## (undated) DEVICE — LABEL MED 4 IN SURG PANEL W/ PEN STRL

## (undated) DEVICE — PACK EXT II SIRUS

## (undated) DEVICE — TROCAR: Brand: KII SHIELDED BLADED ACCESS SYSTEM

## (undated) DEVICE — OPTISITE ARTERIAL PERFUSION CANNULA: Brand: OPTISITE ARTERIAL PERFUSION CANNULA

## (undated) DEVICE — BASIC SINGLE BASIN 1-LF: Brand: MEDLINE INDUSTRIES, INC.

## (undated) DEVICE — DECANTER BAG 9": Brand: MEDLINE INDUSTRIES, INC.

## (undated) DEVICE — WARMER SCP LAP

## (undated) DEVICE — TTL1LYR 16FR10ML 100%SIL TMPST TR: Brand: MEDLINE

## (undated) DEVICE — SUTURE BOOTIES, YELLOW, STERILE, 5 PAIR/PAD; 5 PADS/BOX: Brand: KEY SURGICAL SUTURE BOOTIES

## (undated) DEVICE — KIT,ANTI FOG,W/SPONGE & FLUID,SOFT PACK: Brand: MEDLINE

## (undated) DEVICE — GUIDEWIRE VASC L260CM TIP L5CM 0.25FR STR RND TIP TUNGSTEN

## (undated) DEVICE — SET ENDO INSTR LAPAROSCOPIC INCISIONAL

## (undated) DEVICE — SET INST MINOR PROCEDURE

## (undated) DEVICE — Z DISCONTINUED GLOVE SURG SZ 7 L12IN FNGR THK13MIL WHT ISOLEX POLYISOPRENE

## (undated) DEVICE — CONNECTOR PERF W3/8XH3/8IN BASE STR W/O LUERLOCK FOR CUST

## (undated) DEVICE — INSUFFLATION TUBING SET WITH FILTER, FUNNEL CONNECTOR AND LUER LOCK: Brand: JOSNOE MEDICAL INC

## (undated) DEVICE — TIBURON GENERAL ENDOSCOPY DRAPE: Brand: CONVERTORS

## (undated) DEVICE — 18 GA N.G. KIT, 10 PACK: Brand: SITE-RITE

## (undated) DEVICE — CONTROL SYRINGE LUER-LOCK TIP: Brand: MONOJECT

## (undated) DEVICE — PACK SURG LAP CHOLE CUSTOM

## (undated) DEVICE — Z DUP USE 2257490 ADHESIVE SKIN CLSRE 036ML TPCL 2CTL CNCRLTE HIGH VSCSTY DRMB

## (undated) DEVICE — SET ENDO INSTR RED YEL LAPAROSCOPIC

## (undated) DEVICE — BANDAGE,SELF ADHRNT,COFLEX,4"X5YD,STRL: Brand: COLABEL

## (undated) DEVICE — SYRINGE MED 50ML LUERLOCK TIP

## (undated) DEVICE — SNAP KOVER: Brand: UNBRANDED

## (undated) DEVICE — MEDI-TRACE CADENCE ADULT, PRE-CONNECT  DEFIBRILLATION ELECTRODE (10 PR/PK) - PHYSIO-CONTROL: Brand: MEDI-TRACE CADENCE

## (undated) DEVICE — GAUZE,SPONGE,2"X2",8PLY,STERILE,LF,2'S: Brand: MEDLINE

## (undated) DEVICE — GARMENT,MEDLINE,DVT,INT,CALF,MED, GEN2: Brand: MEDLINE

## (undated) DEVICE — BLADE ES ELASTOMERIC COAT INSUL DURABLE BEND UPTO 90DEG

## (undated) DEVICE — TRAY,SKIN SCRUB,DRY,W/GAUZE: Brand: MEDLINE